# Patient Record
Sex: MALE | Race: WHITE | NOT HISPANIC OR LATINO | Employment: OTHER | URBAN - METROPOLITAN AREA
[De-identification: names, ages, dates, MRNs, and addresses within clinical notes are randomized per-mention and may not be internally consistent; named-entity substitution may affect disease eponyms.]

---

## 2017-01-02 ENCOUNTER — MYC MEDICAL ADVICE (OUTPATIENT)
Dept: FAMILY MEDICINE | Facility: CLINIC | Age: 47
End: 2017-01-02

## 2017-01-02 ENCOUNTER — MYC REFILL (OUTPATIENT)
Dept: ENDOCRINOLOGY | Facility: CLINIC | Age: 47
End: 2017-01-02

## 2017-01-02 ENCOUNTER — MYC MEDICAL ADVICE (OUTPATIENT)
Dept: PALLIATIVE MEDICINE | Facility: CLINIC | Age: 47
End: 2017-01-02

## 2017-01-02 ENCOUNTER — CARE COORDINATION (OUTPATIENT)
Dept: CARE COORDINATION | Facility: CLINIC | Age: 47
End: 2017-01-02

## 2017-01-02 ENCOUNTER — MYC REFILL (OUTPATIENT)
Dept: FAMILY MEDICINE | Facility: CLINIC | Age: 47
End: 2017-01-02

## 2017-01-02 ENCOUNTER — HOSPITAL ENCOUNTER (OUTPATIENT)
Dept: MRI IMAGING | Facility: CLINIC | Age: 47
Discharge: HOME | End: 2017-01-02
Attending: FAMILY MEDICINE | Admitting: FAMILY MEDICINE
Payer: COMMERCIAL

## 2017-01-02 DIAGNOSIS — Z79.891 ENCOUNTER FOR LONG-TERM OPIATE ANALGESIC USE: Primary | ICD-10-CM

## 2017-01-02 DIAGNOSIS — G89.18 PAIN FOLLOWING SURGERY OR PROCEDURE: ICD-10-CM

## 2017-01-02 DIAGNOSIS — Z98.84 BARIATRIC SURGERY STATUS: ICD-10-CM

## 2017-01-02 DIAGNOSIS — K29.70 GASTRITIS: ICD-10-CM

## 2017-01-02 DIAGNOSIS — M79.2 RADICULAR PAIN IN LEFT ARM: ICD-10-CM

## 2017-01-02 DIAGNOSIS — R10.9 CHRONIC ABDOMINAL PAIN: ICD-10-CM

## 2017-01-02 DIAGNOSIS — R11.0 NAUSEA: ICD-10-CM

## 2017-01-02 DIAGNOSIS — E55.9 VITAMIN D DEFICIENCY: ICD-10-CM

## 2017-01-02 DIAGNOSIS — F41.9 CHRONIC ANXIETY: ICD-10-CM

## 2017-01-02 DIAGNOSIS — G89.29 CHRONIC ABDOMINAL PAIN: ICD-10-CM

## 2017-01-02 DIAGNOSIS — M54.2 CERVICAL PAIN: ICD-10-CM

## 2017-01-02 DIAGNOSIS — G47.00 PERSISTENT INSOMNIA: ICD-10-CM

## 2017-01-02 PROCEDURE — 72141 MRI NECK SPINE W/O DYE: CPT

## 2017-01-02 NOTE — PROGRESS NOTES
Clinic Care Coordination Contact  [facility]/Voicemail    Referral Source: IP/TCU Report  Clinical Data: Care Coordinator Outreach  Patient presented to [facility] ED on 12/30 with non-functioning GT. Per chart review, GT was replaced and patient DC'd from ED with no new needs.   Outreach attempted x 1.  Left message on voicemail with call back information and requested return call.  Plan: Care Coordinator mailed out care coordination introduction letter on 10/3/16. Care Coordinator will try to reach patient again in 1-2 business days.  Kyle KHAN,RN- BC  Clinic Care Coordinator  [facility]  Phone: [phone]

## 2017-01-03 ENCOUNTER — CARE COORDINATION (OUTPATIENT)
Dept: CARE COORDINATION | Facility: CLINIC | Age: 47
End: 2017-01-03

## 2017-01-03 RX ORDER — ERGOCALCIFEROL 1.25 MG/1
50000 CAPSULE, LIQUID FILLED ORAL
Qty: 12 CAPSULE | Refills: 3 | Status: SHIPPED | OUTPATIENT
Start: 2017-01-03 | End: 2017-04-29

## 2017-01-03 RX ORDER — SUCRALFATE ORAL 1 G/10ML
1 SUSPENSION ORAL 4 TIMES DAILY
Qty: 1200 ML | Refills: 11 | Status: SHIPPED | OUTPATIENT
Start: 2017-01-03 | End: 2017-04-29

## 2017-01-03 RX ORDER — ONDANSETRON 8 MG/1
8 TABLET, ORALLY DISINTEGRATING ORAL EVERY 8 HOURS PRN
Qty: 90 TABLET | Refills: 3 | Status: SHIPPED | OUTPATIENT
Start: 2017-01-03 | End: 2017-04-29

## 2017-01-03 RX ORDER — LIDOCAINE/PRILOCAINE 2.5 %-2.5%
CREAM (GRAM) TOPICAL PRN
Qty: 30 G | Refills: 10 | Status: SHIPPED | OUTPATIENT
Start: 2017-01-03 | End: 2017-01-24

## 2017-01-03 RX ORDER — OXYCODONE HCL 5 MG/5 ML
10-15 SOLUTION, ORAL ORAL EVERY 4 HOURS PRN
Qty: 1350 ML | Refills: 0 | Status: SHIPPED | OUTPATIENT
Start: 2017-01-03 | End: 2017-02-02

## 2017-01-03 RX ORDER — FENTANYL 50 UG/1
1 PATCH TRANSDERMAL
Qty: 15 PATCH | Refills: 0 | Status: SHIPPED | OUTPATIENT
Start: 2017-01-03 | End: 2017-02-02

## 2017-01-03 RX ORDER — DIAZEPAM 5 MG
TABLET ORAL
Qty: 60 TABLET | Refills: 2 | Status: SHIPPED | OUTPATIENT
Start: 2017-01-03 | End: 2017-03-23

## 2017-01-03 RX ORDER — CYANOCOBALAMIN 1000 UG/ML
1 INJECTION, SOLUTION INTRAMUSCULAR; SUBCUTANEOUS
Qty: 1 ML | Refills: 11 | Status: SHIPPED | OUTPATIENT
Start: 2017-01-03 | End: 2017-04-11

## 2017-01-03 NOTE — PROGRESS NOTES
Clinic Care Coordination Contact  OUTREACH    Referral Information:  Referral Source: IP/TCU Report  Reason for Contact: Follow up        Universal Utilization:   ED Visits in last year: 12  Hospital visits in last year: 4     Missed Appointments: 1  Concerns: yes, high utilization  Multiple Providers or Specialists: yes    Clinical Concerns:  Current Medical Concerns: RN CC Called to f/u with pt/wife and to introduce new RN CC as previous RN CC is out on leave. Pt's wife was given RN CC contact information. Pt was in the ED on 12/30. G-tube was clogged and needed to be replaced. Pt had an MRI yesterday (due to neck, L arm pain) and will f/u with Neurology and PT this month.   Current Behavioral Concerns: NA    Education Provided to patient: NA   Clinical Pathway Name: None  Clinical Pathway: None    Medication Management:  No changes     Functional Status:  Mobility Status: Independent  Equipment Currently Used at Home: cane, straight, medication pump     Psychosocial:  Current living arrangement:: I live in a private home with spouse  Financial/Insurance: wife reports financial status is resolved. They are still looking for a different housing situation, but denies needing any assistance at this time.        Resources and Interventions:  Current Resources: Home Care, Home Infusion; Other (see comment) ([employer] and [employer] resolving rental)  Advanced Care Plans/Directives on file:: Yes       Plan:   1. No urgent concerns at this time. Apt scheduled as recommended.  2. RN CC will f/u with pt/wife again in 2-4 weeks. Wife encouraged to call sooner if needed.    Jyothi Byrd, RN-BSN  RN Clinic Care Coordinator  [facility]  [phone]

## 2017-01-03 NOTE — TELEPHONE ENCOUNTER
Vitamin D 65156 unit capsule - I could not locate this medication in patient's history      sucralfate (CARAFATE) 1 GM/10ML suspension      Last Written Prescription Date: 6/21/2016  Last Fill Quantity: 1200,  # refills: 11   Last Office Visit with [facility], [facility] or [facility] prescribing provider: 12/28/2016                                         Next 5 appointments (look out 90 days)     Feb 15, 2017  1:30 PM   Return Visit with Patti Milligan MD   [facility] ([facility])    [address] [address]   [phone]            Mar 02, 2017 11:00 AM   Office Visit with Esteban Daly MD   [facility] ([facility]    [address]  [place] [address]   [phone]                  lidocaine-prilocaine (EMLA) cream      Last Written Prescription Date:  10/18/2016  Last Fill Quantity: 30g,   # refills: 10  Last Office Visit with [facility], [facility] or [facility] prescribing provider: 12/28/2016  Future Office visit:    Next 5 appointments (look out 90 days)     Feb 15, 2017  1:30 PM   Return Visit with Patti Milligan MD   [facility] ([facility])    [address] [address]   [phone]            Mar 02, 2017 11:00 AM   Office Visit with Esteban Daly MD   [facility] ([facility])    [address]  [place] [address]   [phone]                   Routing refill request to provider for review/approval because:  Drug not on the [facility], [facility] or [facility] refill protocol or controlled substance    diazepam (VALIUM) 5 MG tablet      Last Written Prescription Date:  12/5/2016  Last Fill Quantity: 60,   # refills: 2  Last Office Visit with [facility], [facility] or [facility] prescribing provider: 12/28/2016  Future Office visit:    Next 5 appointments (look out 90 days)     Feb 15, 2017  1:30 PM   Return Visit with Patti  Bree Milligan MD   [facility] ([facility])    [address] [place] [address]   [phone]            Mar 02, 2017 11:00 AM   Office Visit with Esteban Daly MD   [facility]    [address]  [place] [address] [phone]                   Routing refill request to provider for review/approval because:  Drug not on the [facility], [facility] or [facility] refill protocol or controlled substance      cyanocobalamin (VITAMIN B12) 1000 MCG/ML injection      Last Written Prescription Date: 12/2/2016  Last Fill Quantity: 1mL,  # refills: 11  Last Office Visit with [facility], [facility] or [facility] prescribing provider: 12/28/2016                                         Next 5 appointments (look out 90 days)     Feb 15, 2017  1:30 PM   Return Visit with Patti Milligan MD   [facility] ([facility])    [address] [address]   [phone]            Mar 02, 2017 11:00 AM   Office Visit with Esteban Daly MD   [facility] ([facility]    [address]  [place] [address] [phone]                    ondansetron (ZOFRAN ODT) 8 MG disintegrating tablet (Discontinued)      Last Written Prescription Date:  2/16/2015  Last Fill Quantity: 90,   # refills: 3  Last Office Visit with [facility], [facility] or [facility] prescribing provider: 12/28/2016  Future Office visit:    Next 5 appointments (look out 90 days)     Feb 15, 2017  1:30 PM   Return Visit with Patti Milligan MD   [facility] ([facility])    [address] [address]   [phone]            Mar 02, 2017 11:00 AM   Office Visit with Esteban Daly MD   [facility] ([facility])    [address]  [place] [address] [phone]

## 2017-01-03 NOTE — TELEPHONE ENCOUNTER
Medication refill information reviewed.     Last due:      Fentanyl:  Fill on/after 12/23/16 to start on/after 12/26/16  Oxycodone:  Fill on/after 12/12/2016 not to start till 12/14/2016    Due date:      Fentanyl:  1/25/17    Oxycodone:  1/13/17    Weaning instructions:  N/A    Prescriptions prepped for review.     Demetrice Yeh RN-BSN  [facility]

## 2017-01-03 NOTE — TELEPHONE ENCOUNTER
Per patient Tomasa message:  Sent   1/2/2017 7:36 PM   I need to renew my oxycodone on the 13th of Jan 2017 and my fentynal patches are do on Jan. 26th 2017 so you can send them both over to [place] in [facility] or you can give me a call at [phone] if you have any questions Thanks again Rose [facility] Parker We hope you had a nice Bairon and a Happy New Year   ----  Routed to the nursing pool and to the MA pool to process refill(s).    Demetrice Yeh, RN-BSN  [facility]

## 2017-01-03 NOTE — TELEPHONE ENCOUNTER
"[facility]  Original authorizing provider: MD Parker Camara  would like a refill of the following medications:  ondansetron (ZOFRAN-ODT) 8 MG disintegrating tablet [Esteban Daly MD]  vitamin D (ERGOCALCIFEROL) 63678 UNIT capsule [Esteban Daly MD]  sucralfate (CARAFATE) 1 GM/10ML suspension [Esteban Daly MD]  lidocaine-prilocaine (EMLA) cream [Esteban Daly MD]  diazepam (VALIUM) 5 MG tablet [Esteban Daly MD]  cyanocobalamin (VITAMIN B12) 1000 MCG/ML injection [Esteban Daly MD]    Preferred pharmacy: [place], [place] - [address] [place] [address]    Comment:  and I will also be picking up the adderal all on Friday Jan 6th 2017 at 1pm Thanks again if you have any questions please let me know thanks    Medication renewals requested in this message routed to other providers:  Needle, Disp, (BD DISP NEEDLES) 27G X 1/2\" SERENITYC [Anita Méndez PA-C]  "

## 2017-01-03 NOTE — TELEPHONE ENCOUNTER
Patient requesting refill(s) of oxyCODONE (ROXICODONE) 5 MG/5ML solution    Last picked up from pharmacy on 12/14/16    fentaNYL (DURAGESIC) 50 mcg/hr 72 hr patch    Last picked up from pharmacy on 12/23/16    Pt last seen by prescribing provider on 11/14/16  Next appt scheduled for 2/15/17    Last urine drug screen date 6/9/16  Current opioid agreement on file (completed within the last year) Yes Date of opioid agreement: 11/25/16    Processing (pick one)    Mail to [facility] [place].    Will route to nursing pool for review and preparation of prescription(s).

## 2017-01-03 NOTE — TELEPHONE ENCOUNTER
Faxed PA   Awaiting decision    [employer] responded: coverage for patient has     Tried calling [employer] phone number listed on patient's insurance.  Unable to get through on this number.     Sent [employer] message to pt to clarify their current insurance

## 2017-01-03 NOTE — TELEPHONE ENCOUNTER
Spoke to patient's wife, she is needing us to do a PA for the brand name adderall.  Reviewed previous PA encounter dated 2/8/2016.  The PA is still effective for coverage through 2/8/2017.  Provider, do you recommend waiting until end of January to submit this PA or send it now? (I can print a copy of the last PA and fax it if you are comfortable with that?)    ID #[id number]  [facility] Ph: [phone]

## 2017-01-03 NOTE — TELEPHONE ENCOUNTER
"[facility]  Original authorizing provider: BRITTANY Guerrero  would like a refill of the following medications:  Needle, Disp, (BD DISP NEEDLES) 27G X 1/2\" MISC [Anita Méndez PA-C]    Preferred pharmacy: [facility] [place], [place] - [address] [place] [address]    Comment:  and I will also be picking up the adderal all on Friday Jan 6th 2017 at 1pm Thanks again if you have any questions please let me know thanks    Medication renewals requested in this message routed to other providers:  ondansetron (ZOFRAN-ODT) 8 MG disintegrating tablet [Esteban Daly MD]  vitamin D (ERGOCALCIFEROL) 46667 UNIT capsule [Esteban Daly MD]  sucralfate (CARAFATE) 1 GM/10ML suspension [Esteban Daly MD]  lidocaine-prilocaine (EMLA) cream [Esteban Daly MD]  diazepam (VALIUM) 5 MG tablet [Esteban Daly MD]  cyanocobalamin (VITAMIN B12) 1000 MCG/ML injection [Esteban Daly MD]  "

## 2017-01-03 NOTE — TELEPHONE ENCOUNTER
Provider, it appears that the adderall Rx was written for the next three months. Please review and advise.

## 2017-01-03 NOTE — TELEPHONE ENCOUNTER
Signed Prescriptions:                        Disp   Refills    oxyCODONE (ROXICODONE) 5 MG/5ML solution   1350 mL0        Sig: Take 10-15 mLs (10-15 mg) by mouth every 4 hours as           needed for moderate to severe pain Max of 45mg           per day. Fill on/after 1/11/17 not to start till           1/13/17.  Authorizing Provider: BRANDYN JENKINS    fentaNYL (DURAGESIC) 50 mcg/hr 72 hr patch 15 pat*0        Sig: Place 1 patch onto the skin every 48 hours MYLAN           BRAND ONLY. Fill on/after 1/23/17 to start           on/after 1/25/17  Authorizing Provider: BRANDYN JENKINS MD  [facility]

## 2017-01-03 NOTE — TELEPHONE ENCOUNTER
Should be fine to use the same information and reasons from last year to obtain approval for brand Adderall.    Esteban Daly MD  Please close encounter when call/work completed.

## 2017-01-04 ENCOUNTER — TELEPHONE (OUTPATIENT)
Dept: FAMILY MEDICINE | Facility: CLINIC | Age: 47
End: 2017-01-04

## 2017-01-04 ENCOUNTER — MYC MEDICAL ADVICE (OUTPATIENT)
Dept: PALLIATIVE MEDICINE | Facility: CLINIC | Age: 47
End: 2017-01-04

## 2017-01-04 ENCOUNTER — MYC MEDICAL ADVICE (OUTPATIENT)
Dept: FAMILY MEDICINE | Facility: CLINIC | Age: 47
End: 2017-01-04

## 2017-01-04 NOTE — TELEPHONE ENCOUNTER
Updated Insurance #: [phone]  ID # [id number]    Representative stated that a PA for adderall 20 mg is NOT needed.  This will ne covered by insurance (pending no changes) until 1/4/2018

## 2017-01-04 NOTE — TELEPHONE ENCOUNTER
Patient had a change in insurance.  Ondansetron 8mg ODT tabs are rejecting  -  Qty limit exceeded, max 18 tablets per 21 days.  Need to request PA for 3 tabs per day.    Patient takes 1 q8h    PA needed for:  Three times daily dosing of Ondansetron 8mg odt tabs  Insurance:  [facility]  Insur phone:  [phone]  Patient ID: [id number]    Thank you     -Fany John, Pharm.D., [facility], [phone]

## 2017-01-04 NOTE — TELEPHONE ENCOUNTER
Updated Insurance #: [phone]  ID # [id number]    PA form placed in Providers inbox to be completed  Then fax to [phone]

## 2017-01-04 NOTE — TELEPHONE ENCOUNTER
"Called the patient's pharmacy to get insurance contact information.  She stated that the patient's insurance \"is not working.\"  They have multiple medications at the pharmcy that have not been picked up, due to this.  Likely, that we are unable to process a PA at this time either.    Will await insurance/phar,acy to direct the need for a PA.  Will not pursue this PA directed by the patient at this time.  "

## 2017-01-04 NOTE — TELEPHONE ENCOUNTER
Reason for PA:    Has had a total gastrectomy and is unable to absorb oral tablets. Needs oral dissolving tablets. Has chronic nausea and abdominal pain and requires 3 times daily administration.    Esteban Daly MD

## 2017-01-05 ENCOUNTER — MYC MEDICAL ADVICE (OUTPATIENT)
Dept: PALLIATIVE MEDICINE | Facility: CLINIC | Age: 47
End: 2017-01-05

## 2017-01-05 NOTE — TELEPHONE ENCOUNTER
Try appealing the decision--    He has needed Zofran daily using multiple doses due to chronic nausea secondary to post operative complication and multiple abdominal surgeries and is need of oral dissolving tablets. Is post total gastrectomy and not able to have oral; tablets be effective. Is also on TPN with no oral intake.    Esteban Daly MD

## 2017-01-05 NOTE — TELEPHONE ENCOUNTER
Called insurance to initiate quantity limit PA for the Fentanyl Patch 50 mcg/hr initiated the PA over the phone and got approval for the medication.   Pharmacy and pt are informed  Approval # [id number]  1/5/17 to 1/5/18    Corky Chatman MA  [facility]

## 2017-01-05 NOTE — TELEPHONE ENCOUNTER
Received reponse from [employer]. PA denied.    Your plan covers this drug when you meet one of these conditions:  -you have hyperemesis gravidarum  -you are undergoing radiation therapy  -you are undergoing moderate to highly emetogenic chemotherapy    Provider, would you like to change the medication or appeal the decision?

## 2017-01-05 NOTE — TELEPHONE ENCOUNTER
Faxed appeal to [phone]    To Appeals Department:  In response to the denial of the medication ondansetron oral dissolving tablets, we must appeal your denial decision.  The patient has needed Zofran daily, using multiple doses, due to chronic nausea, secondary to post-operative complications and multiple abdominal surgeries.  He is in need of oral dissolving tablets, as a result of a total gastrectomy.  Parker is not able to have oral tablets, they would be ineffective.   The patient is also on TPN with no oral intake.  We ask that you reevaluate this authorization and approve your coverage of ondansetron (ZOFRAN-ODT) 8 MG oral dissolving tablets.  Awaiting decision/response

## 2017-01-05 NOTE — TELEPHONE ENCOUNTER
Per patient Tomasa message:  Sent   1/4/2017  3:02 PM           you will need to call this number so steven can get the myland brand and for the quanty of patches he needs he has a new pharmacy card and the phone # is [phone] and what they all need is a letter stating why he needs and medical records to back it up his date of birth which is 1970 and his ID # [id number] and just incase they need the other numbers RXBIN # [id number] [id number] # [id number] # [id number] if you have any questions feel free to call us at [phone] thanks again Alina

## 2017-01-05 NOTE — TELEPHONE ENCOUNTER
Routed to the [facility] to call pharmacy to find out more information about prior authorization.    Current sig for fentanyl is:  fentaNYL (DURAGESIC) 50 mcg/hr 72 hr patch 15 patch 0 1/3/2017  No      Sig: Place 1 patch onto the skin every 48 hours MYLAN BRAND ONLY. Fill on/after 1/23/17 to start on/after 1/25/17   Mylan brand is used b/c they adhere to skin better than other brands and pt has had issues w/ other patch brands sticking.    Demetrice Yeh, RN-BSN  [facility]

## 2017-01-06 ENCOUNTER — MYC MEDICAL ADVICE (OUTPATIENT)
Dept: PALLIATIVE MEDICINE | Facility: CLINIC | Age: 47
End: 2017-01-06

## 2017-01-06 ENCOUNTER — TELEPHONE (OUTPATIENT)
Dept: FAMILY MEDICINE | Facility: CLINIC | Age: 47
End: 2017-01-06

## 2017-01-06 NOTE — TELEPHONE ENCOUNTER
Rose called back and stated they have not yet picked up the medication. It costs $365 for brand adderall.    Called insurance again.  No PA is needed. Brand adderall is $365. Generic is $10.  Patient will need to pay out of pocket until they meet their deductible.    Rose informed of this. Nothing more we can do at this time.

## 2017-01-06 NOTE — TELEPHONE ENCOUNTER
My chart message from pt:    Do we need to come to the office to  his percription slips or are you going to mail them over please let us know thanks Rose Rose    They were mailed out on 1/3/2017 to [facility] pharmacy.     Karo Nuñez RN, [facility]  Pain Clinic Care Coordinator

## 2017-01-06 NOTE — TELEPHONE ENCOUNTER
Cammy wife said that her insurance coming would not let Parker have name brand klarissa.  Please call her.

## 2017-01-06 NOTE — TELEPHONE ENCOUNTER
Called insurance to verify that the adderall is indeed covered - brand name adderall. The rep stated yes.  She stated that they see there is a paid claim for the medication today.    Left detailed message informing patient.   Asked them to call back with any other concerns.

## 2017-01-09 ENCOUNTER — MYC MEDICAL ADVICE (OUTPATIENT)
Dept: FAMILY MEDICINE | Facility: CLINIC | Age: 47
End: 2017-01-09

## 2017-01-09 NOTE — TELEPHONE ENCOUNTER
Parker Acevedo Sr is a 46 year old male who calls with rectal bleeding.    NURSING ASSESSMENT:  Description:  Toilet paper had a blood clot on it, covered about 3 sheets of toilet paper and had a bloody nose at the same time. Bleeding occurred for under a minute and resolved on its own. Neck pain is ongoing. Denies chest pain, shortness of breath, lightheadedness, dizziness, fevers, rash, severe bleeding or pain.   Onset/duration:  01/09/2017  Pain scale (0-10)   5/10 in the neck and abdomen   Last exam/Treatment:  12/28/2016  Allergies:   Allergies   Allergen Reactions     Penicillins Anaphylaxis     Doxycycline Rash     Vancomycin Rash     Rash after receiving vancomycin 3/28/16 (red man's?). Tolerated with slower infusion and diphenhydramine premed.     NURSING PLAN: Nursing advice to patient to monitor    RECOMMENDED DISPOSITION:  Home care advice - per Rectal Problems protocol   Will comply with recommendation: Yes  If further questions/concerns or if symptoms do not improve, worsen or new symptoms develop, call your PCP or [facility] as soon as possible.    NOTES:  Disposition was determined by the first positive assessment question, therefore all previous assessment questions were negative    Guideline used:  Telephone Triage Protocols for Nurses, Fourth Edition, Rand Joseph  Rectal Problems  Nursing Judgment    Melva Alfredo RN

## 2017-01-09 NOTE — TELEPHONE ENCOUNTER
Per patient [employer] message:    Sent   1/6/2017  6:17 PM           Since we will be in [place] for an appointment with Dr Campos will it be ok to  his medicine on that Thursday Jan. 12th instead of driving back out there on Friday the 13th            Routed to the nursing pool to process.    Demetrice Yeh, RN-BSN  [facility]

## 2017-01-12 ENCOUNTER — OFFICE VISIT (OUTPATIENT)
Dept: NEUROSURGERY | Facility: OTHER | Age: 47
End: 2017-01-12
Payer: COMMERCIAL

## 2017-01-12 VITALS — HEIGHT: 71 IN | WEIGHT: 173.2 LBS | BODY MASS INDEX: 24.25 KG/M2 | TEMPERATURE: 97.9 F

## 2017-01-12 DIAGNOSIS — M54.2 CERVICALGIA: Primary | ICD-10-CM

## 2017-01-12 PROCEDURE — 99204 OFFICE O/P NEW MOD 45 MIN: CPT | Performed by: NEUROLOGICAL SURGERY

## 2017-01-12 NOTE — PROGRESS NOTES
"Parker Acevedo Sr is a 46 year old male who presents for:  Chief Complaint   Patient presents with     Neurologic Problem     cervical pain can go into arms        Initial Vitals:  Temp(Src) 97.9  F (36.6  C) (Skin)  Ht 5' 11\" (1.803 m)  Wt 173 lb 3.2 oz (78.563 kg)  BMI 24.17 kg/m2 Estimated body mass index is 24.17 kg/(m^2) as calculated from the following:    Height as of this encounter: 5' 11\" (1.803 m).    Weight as of this encounter: 173 lb 3.2 oz (78.563 kg).. Body surface area is 1.98 meters squared. BP completed using cuff size: NA (Not Taken)  Data Unavailable    Do you feel safe in your environment?  Yes  Do you need any refills today? No    Nursing Comments:       5 min nursing intake time  Rene Daniel    Discharge plan:    nursing discharge time   signature    "

## 2017-01-12 NOTE — MR AVS SNAPSHOT
After Visit Summary   1/12/2017    Parker Acevedo Sr    MRN: [id number]           Patient Information     Date Of Birth          1970        Visit Information        Provider Department      1/12/2017 11:20 AM Darren Campos MD [facility] [place]        Today's Diagnoses     Cervicalgia    -  1       Care Instructions    Physical therapy for neck  Injection at [facility]        Follow-ups after your visit        Additional Services     PAIN MANAGEMENT CENTER ([place]) REFERRAL       Your provider has referred you to the [facility].    Reason for Referral: Procedure or injection only - patient will be contacted within 24 hrs to schedule: C5-6 epidural steroid injection     Please complete the following questions:    What is your diagnosis for the patient's pain? Cervical pain    Do you have any specific questions for the pain specialist? No    Are there any red flags that may impact the assessment or management of the patient? None    **ANY DIAGNOSTIC TESTS THAT ARE NOT IN EPIC SHOULD BE SENT TO THE PAIN CENTER**    Please note the Pre-Op Pain Consult must be scheduled 2-3 weeks prior to the patient's surgery.  Patient's trying to schedule within 2 weeks of surgery may not be accommodated.     Pre-Op Pain Consults are only good for 30 days.    REGARDING OPIOID MEDICATIONS:  We will always address appropriateness of opioid pain medications, but we generally will not automatically take on a prescribing role. When we do take on prescribing of opioids for chronic pain, it is in collaboration with the referring physician for an intermediate period of time (months), with an expectation that the primary physician or provider will assume the prescribing role if medications are effective at stable doses with demonstrated compliance.  Therefore, please do not assume that your prescribing responsibilities end on the day of pain clinic  consultation.  Is this agreeable to you? YES    For any questions, contact the [facility] at [phone].    Please be aware that coverage of these services is subject to the terms and limitations of your health insurance plan.  Call member services at your health plan with any benefit or coverage questions.      Please bring the following with you to your appointment:    (1) Any X-Rays, CTs or MRIs which have been performed.  Contact the facility where they were done to arrange for  prior to your scheduled appointment.    (2) List of current medications   (3) This referral request   (4) Any documents/labs given to you for this referral            PHYSICAL THERAPY REFERRAL       *This therapy referral will be filtered to a centralized scheduling office at [facility] and the patient will receive a call to schedule an appointment at a [place] location most convenient for them. *     [facility] provides Physical Therapy evaluation and treatment and many specialty services across the [place] system.  If requesting a specialty program, please choose from the list below.    If you have not heard from the scheduling office within 2 business days, please call [phone] for all locations, with the exception of [place], please call [phone].  Treatment: Evaluation & Treatment  Special Instructions/Modalities:   Special Programs: None    Please be aware that coverage of these services is subject to the terms and limitations of your health insurance plan.  Call member services at your health plan with any benefit or coverage questions.      **Note to Provider:  If you are referring outside of [place] for the therapy appointment, please list the name of the location in the  special instructions  above, print the referral and give to the patient to schedule the appointment.                  Your next 10 appointments already scheduled     Jan 17,  2017 11:00 AM   AMA[facility] Spine with Antonio Tinoco PT   [facility] [facility] ([facility])    [address]  [address]  [place] [address]   [phone]            Jan 19, 2017 11:20 AM   (Arrive by 11:05 AM)   RETURN BARIATRIC SURGERY with Francis Vyas MD   [facility] ([facility])    [address] [place] [address]  4th Floor  [facility] [address] [phone]            Feb 15, 2017  1:30 PM   Return Visit with Patti Milligan MD   [facility] [place] ([facility] [place])    [address]  [place] [address]   [phone]            Mar 02, 2017 11:00 AM   Office Visit with Esteban Daly MD   [facility]    [address]  [facility] [id number] [phone]           Bring a current list of meds and any records pertaining to this visit.  For Physicals, please bring immunization records and any forms needing to be filled out.  Please arrive 10 minutes early to complete paperwork.              Who to contact     If you have questions or need follow up information about today's clinic visit or your schedule please contact [facility] directly at [phone].  Normal or non-critical lab and imaging results will be communicated to you by MyChart, letter or phone within 4 business days after the clinic has received the results. If you do not hear from us within 7 days, please contact the clinic through [employer] or phone. If you have a critical or abnormal lab result, we will notify you by phone as soon as possible.  Submit refill requests through [employer] or call your pharmacy and they will forward the refill request to us. Please allow 3 business days for your refill to be completed.          Additional Information About Your Visit        [employer] Information     [employer] gives you secure access to your electronic  "health record. If you see a primary care provider, you can also send messages to your care team and make appointments. If you have questions, please call your primary care clinic.  If you do not have a primary care provider, please call [phone] and they will assist you.        Care EveryWhere ID     This is your Care EveryWhere ID. This could be used by other organizations to access your [facility] medical records  [id number]        Your Vitals Were     Temperature Height BMI (Body Mass Index)             97.9  F (36.6  C) (Skin) 5' 11\" (1.803 m) 24.17 kg/m2          Blood Pressure from Last 3 Encounters:   12/31/16 124/81   12/28/16 120/62   12/02/16 122/66    Weight from Last 3 Encounters:   01/12/17 173 lb 3.2 oz (78.563 kg)   12/28/16 173 lb 8 oz (78.699 kg)   12/02/16 196 lb 3.2 oz (88.996 kg)              We Performed the Following     PAIN MANAGEMENT CENTER ([place]) REFERRAL     PHYSICAL THERAPY REFERRAL          Today's Medication Changes          These changes are accurate as of: 1/12/17 11:54 AM.  If you have any questions, ask your nurse or doctor.               These medicines have changed or have updated prescriptions.        Dose/Directions    mupirocin 2 % ointment   Commonly known as:  BACTROBAN   This may have changed:    - when to take this  - reasons to take this   Used for:  Skin infection        Apply topically 3 times daily   Quantity:  30 g   Refills:  6                Primary Care Provider Office Phone # Fax #    Esteban Daly [phone]       [facility] [address] [address] [facility] [address]        Thank you!     Thank you for choosing [facility]  for your care. Our goal is always to provide you with excellent care. Hearing back from our patients is one way we can continue to improve our services. Please take a few minutes to complete the written survey that you may receive in the mail after your visit with us. Thank you!           " "  Your Updated Medication List - Protect others around you: Learn how to safely use, store and throw away your medicines at www.disposemymeds.org.          This list is accurate as of: 1/12/17 11:54 AM.  Always use your most recent med list.                   Brand Name Dispense Instructions for use    * amphetamine-dextroamphetamine 20 MG per tablet    ADDERALL    60 tablet    Take 1 tablet (20 mg) by mouth 2 times daily       * amphetamine-dextroamphetamine 20 MG per tablet   Start taking on:  2/3/2017    ADDERALL    60 tablet    Take 1 tablet (20 mg) by mouth 2 times daily       * amphetamine-dextroamphetamine 20 MG per tablet   Start taking on:  3/6/2017    ADDERALL    60 tablet    Take 1 tablet (20 mg) by mouth 2 times daily       cyanocobalamin 1000 MCG/ML injection    VITAMIN B12    1 mL    Inject 1 mL (1,000 mcg) into the muscle every 30 days       dextrose 1,000 mL with sodium chloride 76.92 mEq solution     2640 mL    Inject 110 mL/hr into the vein continuous       diazepam 5 MG tablet    VALIUM    60 tablet    5 mg each morning and 5 mg at bedtime       [facility] INFUSION MANAGED PATIENT      Contact [facility] for patient specific medication information at [phone] on admission and discharge from the hospital.  Phones are answered 24 hours a day 7 days a week 365 days a year.  Providers - Choose \"CONTINUE HOME MED (no script)\" at discharge if patient treatment with home infusion will continue.       fentaNYL 50 mcg/hr 72 hr patch    DURAGESIC    15 patch    Place 1 patch onto the skin every 48 hours MYLAN BRAND ONLY. Fill on/after 1/23/17 to start on/after 1/25/17       lidocaine-prilocaine cream    EMLA    30 g    Apply topically as needed for moderate pain       morphine 0.1% in intrasite topical gel     100 g    Apply as needed prior to accessing the port site.       mupirocin 2 % ointment    BACTROBAN    30 g    Apply topically 3 times daily       Needle (Disp) 27G X 1/2\" Misc "    BD DISP NEEDLES    3 each    1 Device every 30 days Use for cyanocobalamin injection once q 30 days.       nystatin-triamcinolone cream    MYCOLOG II    60 g    Apply topically 2 times daily as needed       ondansetron 8 MG ODT tab    ZOFRAN-ODT    90 tablet    Take 1 tablet (8 mg) by mouth every 8 hours as needed for nausea       * order for DME     12 each    Injection Supplies for Vitamin B12: 3cc syringes w/ 27 gauge needles, 1/2 inch length       * order for DME     4 each    Equipment being ordered: Bilateral knee high chronic venous insufficiency stockings--  mild-moderate pressures.       oxyCODONE 5 MG/5ML solution    ROXICODONE    1350 mL    Take 10-15 mLs (10-15 mg) by mouth every 4 hours as needed for moderate to severe pain Max of 45mg per day. Fill on/after 1/11/17 not to start till 1/13/17.       polyethylene glycol powder    MIRALAX    510 g    Take 17 g (1 capful) by mouth daily as needed       senna-docusate 8.6-50 MG per tablet    SENOKOT-S;PERICOLACE    120 tablet    Take 1-2 tablets by mouth 2 times daily as needed for constipation       sucralfate 1 GM/10ML suspension    CARAFATE    1200 mL    Take 10 mLs (1 g) by mouth 4 times daily       vitamin D 10791 UNIT capsule    ERGOCALCIFEROL    12 capsule    Take 1 capsule (50,000 Units) by mouth every 7 days       * Notice:  This list has 5 medication(s) that are the same as other medications prescribed for you. Read the directions carefully, and ask your doctor or other care provider to review them with you.

## 2017-01-12 NOTE — PROGRESS NOTES
46-year-old male presents with left arm pain, neck pain, and C5 6 disc herniation.  He had a fall over a couch in December.  Since that fall, he has had eight out of 10 neck pain, with sharp pain at the base of the occiput, radiating into the left arm and hand, and left hand weakness.  The arm pain and weakness of slightly improved since the fall.  The pain is made worse by standing or neck range of motion.         Past Medical History   Diagnosis Date     Tobacco abuse      ADHD (attention deficit hyperactivity disorder)      Gastro-oesophageal reflux disease      Hiatal hernia      Gastric ulcer, unspecified as acute or chronic, without mention of hemorrhage, perforation, or obstruction      Difficulty swallowing      Chronic abdominal pain      Short gut syndrome      Severe malnutrition (H)      TPN     Other bladder disorder      Head injury      Anxiety      Other chronic pain      Past Surgical History   Procedure Laterality Date     Hernia repair  2006     Umbilical hernia     Endoscopy  03/25/11     [facility]     Endoscopy  08/04/09     Upper Endoscopy, [facility]     Endoscopy  01/05/09     Upper Endoscopy[facility]     Herniorrhaphy hiatal  6/22/2012     Procedure: HERNIORRHAPHY HIATAL;;  Surgeon: Francis Vyas MD;  Location: [facility] OR     Celestino en y bowel  2003     Cholecystectomy       Gastrojejunostomy  08/26/09     Extensice enterolysis, partial resect. jejunum, part. resect gastric pouch, gastrojejunostomy anastomosis     Gastrectomy  6/22/2012     Procedure: GASTRECTOMY;  Open Approach, Excise Ulcers,Partial Gastrectomy, Esophagojejunostomy, Hiatal Hernia Repair, Extensive Lysis of Adhesions and Esaphagogastrodudenoscopy.;  Surgeon: Francis Vyas MD;  Location: [facility] OR     Tonsillectomy       C gastric bypass,obese<100cm celestino-en-y  2002     lost 300 pounds     Esophagoscopy, gastroscopy, duodenoscopy (egd), combined  4/20/2011     Procedure:COMBINED  ESOPHAGOSCOPY, GASTROSCOPY, DUODENOSCOPY (EGD); Surgeon:FRANCIS VYAS; Location:[facility]     Endoscopic ultrasound upper gastrointestinal tract (gi)  4/29/2011     Procedure:ENDOSCOPIC ULTRASOUND UPPER GASTROINTESTINAL TRACT (GI); Both Procedures done Conjointly; Surgeon:NEREIDA HOUSER; [facility]     Esophagoscopy, gastroscopy, duodenoscopy (egd), combined  6/15/2011     Procedure:COMBINED ESOPHAGOSCOPY, GASTROSCOPY, DUODENOSCOPY (EGD); Surgeon:FRANCIS VYAS; [facility]     Esophagoscopy, gastroscopy, duodenoscopy (egd), combined  6/12/2013     Procedure: COMBINED ESOPHAGOSCOPY, GASTROSCOPY, DUODENOSCOPY (EGD);;  Surgeon: Francis Vyas MD;  Location: [facility]     Hc esoph/gas reflux test w nasal imped electrode  8/5/2013     Procedure: ESOPHAGEAL IMPEDENCE FUNCTION TEST 1 HOUR OR LESS;  Surgeon: Halie Lang MD;  Location: [facility]     Endoscopic ultrasound upper gastrointestinal tract (gi)  9/9/2013     Procedure: ENDOSCOPIC ULTRASOUND UPPER GASTROINTESTINAL TRACT (GI);  Endoscopic Ultrasound Guide Gastrostomy Tube Placement  C-arm;  Surgeon: Noe Lizarraga MD;  Location: [facility]     Endoscopic insertion tube gastrostomy  9/9/2013     Procedure: ENDOSCOPIC INSERTION TUBE GASTROSTOMY;;  Surgeon: Francis Vyas MD;  [facility]     Laparotomy exploratory  11/22/2013     Procedure: LAPAROTOMY EXPLORATORY;  Exploratory Laparotomy, Upper Endoscopy, Left Inguinal Hernia Repair;  Surgeon: Francis Vyas MD;  [facility]: [facility]     Herniorrhaphy inguinal  11/22/2013     Procedure: HERNIORRHAPHY INGUINAL;;  Surgeon: Francis Vyas MD;  [facility]     Esophagoscopy, gastroscopy, duodenoscopy (egd), combined  11/22/2013     Procedure: COMBINED ESOPHAGOSCOPY, GASTROSCOPY, DUODENOSCOPY (EGD);;  Surgeon: Francis Vyas MD;  [facility]     Esophagoscopy, gastroscopy, duodenoscopy (egd), combined  4/30/2014     Procedure: COMBINED ESOPHAGOSCOPY, GASTROSCOPY,  DUODENOSCOPY (EGD);  Surgeon: Francis Vyas MD;  Location:  GI     Appendectomy       Soft tissue surgery       Back surgery  11/3/2014     curve in the spine     Biopsy lymph node cervical N/A 2/20/2015     Procedure: BIOPSY LYMPH NODE CERVICAL;  Surgeon: Baron Scanlon MD;  Location: [facility]     Esophagoscopy, gastroscopy, duodenoscopy (egd), combined N/A 2/20/2015     Procedure: COMBINED ESOPHAGOSCOPY, GASTROSCOPY, DUODENOSCOPY (EGD), BIOPSY SINGLE OR MULTIPLE;  Surgeon: Baron Scanlon MD;  Location: [facility] OR     Soft tissue surgery       Esophagoscopy, gastroscopy, duodenoscopy (egd), combined N/A 9/30/2015     Procedure: COMBINED ESOPHAGOSCOPY, GASTROSCOPY, DUODENOSCOPY (EGD);  Surgeon: Francis Vyas MD;  Location:  GI     Social History     Social History     Marital Status:      Spouse Name: Rose     Number of Children: 1     Years of Education: N/A     Occupational History     Not on file.     Social History Main Topics     Smoking status: Light Tobacco Smoker -- 0.10 packs/day for 3 years     Types: Cigarettes     Smokeless tobacco: Current User      Comment: I use an e cig every now and than     Alcohol Use: No      Comment: quit      Drug Use: No     Sexual Activity:     Partners: Female     Birth Control/ Protection: None      Comment: no protection     Other Topics Concern     Parent/Sibling W/ Cabg, Mi Or Angioplasty Before 65f 55m? No     Social History Narrative     Family History   Problem Relation Age of Onset     DIABETES Maternal Uncle      GASTROINTESTINAL DISEASE Mother      Crohns disease     Anxiety Disorder Mother      Thyroid Disease Mother      Grave's disease     CANCER Father      ear cancer-skin cancer/melanoma     Breast Cancer Maternal Grandmother      Breast Cancer Other      Hypertension No family hx of      Hyperlipidemia No family hx of      CEREBROVASCULAR DISEASE No family hx of      Breast Cancer No family hx of      Prostate Cancer No family  "hx of      Depression No family hx of      Anesthesia Reaction No family hx of      Asthma No family hx of      OSTEOPOROSIS No family hx of      Genetic Disorder No family hx of      Obesity No family hx of      MENTAL ILLNESS No family hx of      Substance Abuse No family hx of         ROS: 10 point ROS neg other than the symptoms noted above in the HPI.    Physical Exam  Temp(Src) 97.9  F (36.6  C) (Skin)  Ht 1.803 m (5' 11\")  Wt 78.563 kg (173 lb 3.2 oz)  BMI 24.17 kg/m2  HEENT:  Normocephalic, atraumatic.  PERRLA.  EOM s intact.  Visual fields full to gross exam  Neck:  Supple, non-tender, without lymphadenopathy.  Heart:  No peripheral edema  Lungs:  No SOB  Abdomen:  Non-distended.   Skin:  Warm and dry.  Extremities:  No edema, cyanosis or clubbing.    NEUROLOGICAL EXAMINATION:     Mental status:  Alert and Oriented x 3, speech is fluent.  Cranial nerves:  II-XII intact.   Motor:  Strength is 5/5 throughout the upper and lower extremities  Shoulder Abduction:  Right:  5/5   Left:  5/5  Biceps:                      Right:  4+/5   Left:  5/5  Triceps:                     Right:  5/5   Left:  5/5  Wrist Extensors:       Right:  5/5   Left:  5/5  Wrist Flexors:           Right:  5/5   Left:  5/5  interosseus :            Right:  4/5   Left:  5/5   Hip Flexor:                Right: 5/5  Left:  5/5  Hip Adductor:             Right:  5/5  Left:  5/5  Hip Abductor:             Right:  5/5  Left:  5/5  Gastroc Soleus:        Right:  5/5  Left:  5/5  Tib/Ant:                      Right:  5/5  Left:  5/5  EHL:                     Right:  5/5  Left:  5/5  Sensation:  Intact  Reflexes:  Negative Babinski.  Negative Clonus.  Negative Johnson's.  Coordination:  Smooth finger to nose testing.   Negative pronator drift.  Smooth tandem walking.    A/P:  46-year-old male presents with left arm pain, neck pain, and C5 6 disc herniation    I had a lengthy discussion with the patient, reviewing the history, symptoms and " imaging  We referred him for physical therapy and epidural steroid injection  We discussed with him that if he doesn't improve, we may consider surgical intervention going forward  We discussed that this would likely consist of a C5 6 ACDF

## 2017-01-13 ENCOUNTER — HOSPITAL ENCOUNTER (OUTPATIENT)
Facility: CLINIC | Age: 47
Setting detail: SPECIMEN
Discharge: HOME | End: 2017-01-13
Admitting: SURGERY
Payer: COMMERCIAL

## 2017-01-13 ENCOUNTER — MYC MEDICAL ADVICE (OUTPATIENT)
Dept: PALLIATIVE MEDICINE | Facility: CLINIC | Age: 47
End: 2017-01-13

## 2017-01-13 LAB
ALBUMIN SERPL-MCNC: 3.5 G/DL (ref 3.4–5)
ALP SERPL-CCNC: 78 U/L (ref 40–150)
ALT SERPL W P-5'-P-CCNC: 21 U/L (ref 0–70)
ANION GAP SERPL CALCULATED.3IONS-SCNC: 7 MMOL/L (ref 3–14)
AST SERPL W P-5'-P-CCNC: 18 U/L (ref 0–45)
BASOPHILS # BLD AUTO: 0 10E9/L (ref 0–0.2)
BASOPHILS NFR BLD AUTO: 0.5 %
BILIRUB DIRECT SERPL-MCNC: 0.1 MG/DL (ref 0–0.2)
BILIRUB SERPL-MCNC: 0.4 MG/DL (ref 0.2–1.3)
BUN SERPL-MCNC: 14 MG/DL (ref 7–30)
CALCIUM SERPL-MCNC: 8.5 MG/DL (ref 8.5–10.1)
CHLORIDE SERPL-SCNC: 106 MMOL/L (ref 94–109)
CO2 SERPL-SCNC: 30 MMOL/L (ref 20–32)
CREAT SERPL-MCNC: 0.76 MG/DL (ref 0.66–1.25)
DIFFERENTIAL METHOD BLD: ABNORMAL
EOSINOPHIL # BLD AUTO: 0.4 10E9/L (ref 0–0.7)
EOSINOPHIL NFR BLD AUTO: 6.5 %
ERYTHROCYTE [DISTWIDTH] IN BLOOD BY AUTOMATED COUNT: 14.4 % (ref 10–15)
GFR SERPL CREATININE-BSD FRML MDRD: NORMAL ML/MIN/1.7M2
GLUCOSE SERPL-MCNC: 87 MG/DL (ref 70–99)
HCT VFR BLD AUTO: 37.9 % (ref 40–53)
HGB BLD-MCNC: 12.3 G/DL (ref 13.3–17.7)
IMM GRANULOCYTES # BLD: 0 10E9/L (ref 0–0.4)
IMM GRANULOCYTES NFR BLD: 0.2 %
LYMPHOCYTES # BLD AUTO: 1.6 10E9/L (ref 0.8–5.3)
LYMPHOCYTES NFR BLD AUTO: 28 %
MAGNESIUM SERPL-MCNC: 2.3 MG/DL (ref 1.6–2.3)
MCH RBC QN AUTO: 31 PG (ref 26.5–33)
MCHC RBC AUTO-ENTMCNC: 32.5 G/DL (ref 31.5–36.5)
MCV RBC AUTO: 96 FL (ref 78–100)
MONOCYTES # BLD AUTO: 0.8 10E9/L (ref 0–1.3)
MONOCYTES NFR BLD AUTO: 14.3 %
NEUTROPHILS # BLD AUTO: 3 10E9/L (ref 1.6–8.3)
NEUTROPHILS NFR BLD AUTO: 50.5 %
NRBC # BLD AUTO: 0 10*3/UL
NRBC BLD AUTO-RTO: 0 /100
PHOSPHATE SERPL-MCNC: 4 MG/DL (ref 2.5–4.5)
PLATELET # BLD AUTO: 146 10E9/L (ref 150–450)
POTASSIUM SERPL-SCNC: 4 MMOL/L (ref 3.4–5.3)
PREALB SERPL IA-MCNC: 21 MG/DL (ref 15–45)
PROT SERPL-MCNC: 6.9 G/DL (ref 6.8–8.8)
RBC # BLD AUTO: 3.97 10E12/L (ref 4.4–5.9)
SODIUM SERPL-SCNC: 143 MMOL/L (ref 133–144)
TRIGL SERPL-MCNC: 44 MG/DL
WBC # BLD AUTO: 5.9 10E9/L (ref 4–11)

## 2017-01-13 PROCEDURE — 84478 ASSAY OF TRIGLYCERIDES: CPT | Performed by: SURGERY

## 2017-01-13 PROCEDURE — 85025 COMPLETE CBC W/AUTO DIFF WBC: CPT | Performed by: SURGERY

## 2017-01-13 PROCEDURE — 82248 BILIRUBIN DIRECT: CPT | Performed by: SURGERY

## 2017-01-13 PROCEDURE — 84100 ASSAY OF PHOSPHORUS: CPT | Performed by: SURGERY

## 2017-01-13 PROCEDURE — 83735 ASSAY OF MAGNESIUM: CPT | Performed by: SURGERY

## 2017-01-13 PROCEDURE — 80053 COMPREHEN METABOLIC PANEL: CPT | Performed by: SURGERY

## 2017-01-13 PROCEDURE — 84134 ASSAY OF PREALBUMIN: CPT | Performed by: SURGERY

## 2017-01-16 NOTE — TELEPHONE ENCOUNTER
Pre-screening questions for Radiology Injections:    Injection to be done at which interventional clinic site? [facility] [place]    Procedure ordered by Dr. JENKINS    Procedure ordered? Cervical Epidural Steroid Injection    What insurance would patient like us to bill for this procedure? BCBS      Worker's comp-Any injection DO NOT SCHEDULE and route to Carly Fuentes.      [employer] insurance - If scheduling an SI joint injection DO NOT SCHEDULE and route Carly Fuentes.    [employer]- MBB's must be scheduled at LEAST two weeks apart      [employer] - Any injection besides hip/shoulder/knee joint DO NOT SCHEDULE and route to Carly Fuentes. She will obtain PA and call pt back to schedule procedure or notify pt of denial.     Is an  needed? No     Patient has a drive home? (mandatory) YES:      Is patient taking any blood thinners (plavix, coumadin, jantoven, warfarin, heparin, pradaxa or dabigatran )? No   (If so, do not schedule, contact RN and/or MD)     Is patient taking any aspirin products? No   (If more than 325mg/day do not schedule; Contact RN/MD. For all non-cervical interventional procedures if patient is taking MORE than 325mg/day, limit aspirin to 81-325mg/day x 1 week. No hold required day of procedure.  For CERVICAL procedures, hold all aspirin products for 6 days.)      Does the patient have a bleeding or clotting disorder? No   (If yes, okay to schedule, but contact RN/MD).  **For any patients with platelet count <100, must be forwarded to provider**    Is patient diabetic?  No  If YES, have them bring their glucometer.    Does patient have an active infection or treated for one within the past week? No     Is patient currently taking any antibiotics?  No   For patients on chronic, preventative, or prophylactic antibiotics, procedures can be scheduled.   For patients on antibiotics for active or recent infection:  Aaron Mena, [place]-antibiotic course  must have been completed for 4 days  Poly De Paz-antibiotic course must have been completed for 7 days    Is patient currently taking any steroid medications? (i.e. Prednisone, Medrol)  No   For patients on steroid medications:  Aaron Mena [place]-steroid course must have been completed for 4 days  Poly De Paz-steroid course must have been completed for 7 days  Review with patient:  If you are started on any steroids or antibiotics between now and your appointment, you must contact us because it may affect our ability to perform your procedure informed    Is patient actively being treated for cancer or immunocompromised, including the spleen having been removed? No  **For Dr. Barahona patients without spleens should have the chart sent to her**  (If YES, do NOT schedule and route to RN)    Are you able to get on and off an exam table with minimal or no assistance? Yes  (If NO, do NOT schedule and route to RN)  Are you able to roll over and lay on your stomach with minimal or no assistance? Yes  (If NO, do NOT schedule and route to RN)         Any allergies to contrast dye, iodine, shellfish, or numbing and steroid medications? No  (If so, inform nursing and note in scheduling comments.)    Allergies: Penicillins; Doxycycline; and Vancomycin      Any chance of pregnancy?NO    Has the patient had a flu shot or any other vaccinations within 7 days before or after the procedure.  No       Does patient have an MRI/CT?  YES:   (SI joint, hip injections, lumbar sympathetic blocks, and stellate ganglion blocks do not require an MRI)    If so, was it done at [place]? Yes      If not, where was it done?      Was the MRI done w/in the last 3 years?  Yes   If MRI was not done at [facility], [facility] or [facility] do NOT schedule. Route to nursing.  (If pt has disc the injection can be scheduled but pt has to bring disc to appt. If they show up w/out disc the injection cannot be done)    Reminders  (please tell patient if applicable):       Instructed pt to arrive 30 minutes early for IV start if this is for a cervical procedure, ALL sympathetic (stellate ganglion, hypogastric, or lumbar sympathetic block) and all sedation procedures (RFA, spinal cord stimulation trials).  YES:     -IVs are not routinely placed for Walker and [place] cervical case       If NPO for sedation, informed patient that it is okay to take medications with sips of water (except if they are to hold blood thinners).  YES: 2 HRS   *DO take blood pressure medication if it is prescribed*      If this is for a cervical MATILDE, informed patient that aspirin needs to be held for 6 days.   Not Applicable      Do not schedule procedures requiring IV placement in the first appointment of the day or first appointment after lunch         For patients 85 or older we recommend having an adult stay w/ them for the remainder of the day.         Does the patient have any questions?        Nafisa Posadas  [facility]

## 2017-01-16 NOTE — TELEPHONE ENCOUNTER
Parker,  We book out farther than that.  Dr. Campos put an order in, and so you should be getting a phone call to schedule.  I do not do cervical epidural steroid injections- but my partner does.  You could also call [phone] to schedule    Jessica Milligan MD  [facility]

## 2017-01-16 NOTE — TELEPHONE ENCOUNTER
My chart message from pt:    Dr. Campos would like me to get a C5-6 epidural steriod njection so I was wondering if you could do it on Tuesday Jan 17th 2017 since I have a Physical therpy appointmennt that day at 11:20 am so if you can let us know thanks again     Parker    The 17th is a Tuesday and on Tuesday Dr. Milligan is at [place] seeing patients all day and unfortunately does not do injections that day. I have sent her this message as well.     Karo Nuñez, RN, [facility]  Pain Clinic Care Coordinator

## 2017-01-17 ENCOUNTER — THERAPY VISIT (OUTPATIENT)
Dept: PHYSICAL THERAPY | Facility: CLINIC | Age: 47
End: 2017-01-17
Payer: COMMERCIAL

## 2017-01-17 DIAGNOSIS — M54.12 CERVICAL RADICULOPATHY: Primary | ICD-10-CM

## 2017-01-17 PROCEDURE — 97162 PT EVAL MOD COMPLEX 30 MIN: CPT | Mod: GP | Performed by: PHYSICAL THERAPIST

## 2017-01-17 NOTE — PROGRESS NOTES
[facility] Initial Evaluation  Subjective:    Parker Acevedo Sr is a 46 year old male with a cervical spine condition.  Condition occurred with:  A fall/slip.  Condition occurred: at home.  This is a new condition  Dec 20, 2016- patient reports going to sit on the couch and he tripped over a cord and fell on an outstretched arm and hit his head and neck. He has had significant pain since  .    Patient reports pain:  Cervical left side.  Radiates to:  Elbow left.  Pain is described as shooting, aching and other (popping, headaches) and is intermittent and reported as 7/10.  Associated symptoms:  Loss of motion/stiffness, headache, loss of strength, numbness and tingling (dropping objects when in L hand, tingling/numbness to L elbow). Pain is worse in the A.M..  Symptoms are exacerbated by rotating head and relieved by nothing.  Since onset symptoms are gradually worsening.  Special tests:  X-ray and MRI (ultrasound).      General health as reported by patient is fair.  Pertinent medical history includes:  Osteoporosis, rheumatoid arthritis, depression and anemia.  Medical allergies: yes (penicilin).  Other surgeries include:  Other (gastric bypass).  Current medications:  Pain medication, heparin/coumadin and anti-depressants.  Current occupation is None.        Barriers include:  None as reported by the patient.    Red flags:  Pain at rest/night and significant weakness (weakness of L hand).                      Objective:    Standing Alignment:    Cervical/Thoracic:  Forward head  Shoulder/UE:  Rounded shoulders                                  Cervical/Thoracic Evaluation  Arom wnl cervical: pain with all sustained and repeated motions, pt notes cracking/popping with all neck movements. Moderately limited with PROM of L & R cervical rotation and retraction.     AROM:  AROM Cervical:    Flexion:            Significantly limited  Extension:       Significantly limited  Rotation:          Left: significantly limited     Right: significantly limited  Side Bend:      Left: significantly limited     Right:  Significantly llimited        Cervical Myotomes:  Cervical myotomes:  strength R: avg of 96#; L: avg of 141#  C1-2 (Neck Flex): Left:  3+    Right: 3+  C3 (neck side bend): Left: 3+    Right: 3+  C4 (shrug):  Left: 4    Right: 4  C5 (Deltoid):  Left: 3    Right: 4  C6 (Biceps):  Left: 3+    Right: 4+  C7 (Triceps):  Left: 3+    Right: 4+            Cervical Palpation:    Tenderness present at Left:    Upper Trap; Levator and Erector Spinae  Tenderness present at Right:    Upper Trap; Levator and Erector Spinae      Spinal Segmental Conclusions:  Decreased mobility of C2-C7 likely due to muscle guarding. Slight improvement in pain with distraction.        Cord Sign:  Cord signs: pt reports no numbness in L UE.                                            General     ROS    Assessment/Plan:      Patient is a 46 year old male with cervical complaints.    Patient has the following significant findings with corresponding treatment plan.                Diagnosis 1:  Cervical radiculopathy  Pain -  US, electric stimulation, mechanical traction, manual therapy, self management, education and home program  Decreased ROM/flexibility - manual therapy, therapeutic exercise and home program  Decreased joint mobility - manual therapy, therapeutic exercise and home program  Decreased strength - therapeutic exercise, therapeutic activities and home program  Decreased function - therapeutic activities and home program  Impaired posture - neuro re-education and home program    Therapy Evaluation Codes:   1) History comprised of:   Personal factors that impact the plan of care:      Coping style and Overall behavior pattern.    Comorbidity factors that impact the plan of care are:      Depression and osteoporosis.     Medications impacting care: Heparin/coumadin and Pain.  2) Examination of Body Systems comprised  of:   Body structures and functions that impact the plan of care:      Cervical spine.   Activity limitations that impact the plan of care are:      Bathing, Bending, Driving, Dressing, Grasping, Sitting and Laying down.  3) Clinical presentation characteristics are:   Unstable/Unpredictable.  4) Decision-Making    Moderate complexity using standardized patient assessment instrument and/or measureable assessment of functional outcome.  Cumulative Therapy Evaluation is: Moderate complexity.    Previous and current functional limitations:  (See Goal Flow Sheet for this information)    Short term and Long term goals: (See Goal Flow Sheet for this information)     Communication ability:  Patient appears to be able to clearly communicate and understand verbal and written communication and follow directions correctly.  Treatment Explanation - The following has been discussed with the patient:   RX ordered/plan of care  Anticipated outcomes  Possible risks and side effects  This patient would benefit from PT intervention to resume normal activities.   Rehab potential is fair due to severity of pain level combined with UE weakness and overall behavior pattern.    Frequency:  2 X week, once daily  Duration:  for 6 weeks  Discharge Plan:  Achieve all LTG.  Independent in home treatment program.  Reach maximal therapeutic benefit.    Please refer to the daily flowsheet for treatment today, total treatment time and time spent performing 1:1 timed codes.

## 2017-01-24 ENCOUNTER — RADIOLOGY INJECTION OFFICE VISIT (OUTPATIENT)
Dept: PALLIATIVE MEDICINE | Facility: CLINIC | Age: 47
End: 2017-01-24
Payer: COMMERCIAL

## 2017-01-24 ENCOUNTER — CARE COORDINATION (OUTPATIENT)
Dept: CARE COORDINATION | Facility: CLINIC | Age: 47
End: 2017-01-24

## 2017-01-24 ENCOUNTER — RADIANT APPOINTMENT (OUTPATIENT)
Dept: RADIOLOGY | Facility: CLINIC | Age: 47
End: 2017-01-24
Attending: ANESTHESIOLOGY
Payer: COMMERCIAL

## 2017-01-24 ENCOUNTER — MYC REFILL (OUTPATIENT)
Dept: FAMILY MEDICINE | Facility: CLINIC | Age: 47
End: 2017-01-24

## 2017-01-24 VITALS — HEART RATE: 60 BPM | DIASTOLIC BLOOD PRESSURE: 75 MMHG | OXYGEN SATURATION: 100 % | SYSTOLIC BLOOD PRESSURE: 116 MMHG

## 2017-01-24 DIAGNOSIS — M47.812 CERVICAL FACET JOINT SYNDROME: Primary | ICD-10-CM

## 2017-01-24 DIAGNOSIS — G89.18 PAIN FOLLOWING SURGERY OR PROCEDURE: ICD-10-CM

## 2017-01-24 DIAGNOSIS — M47.12 CERVICAL SPONDYLOSIS WITH MYELOPATHY: ICD-10-CM

## 2017-01-24 DIAGNOSIS — M54.12 CERVICAL RADICULAR PAIN: ICD-10-CM

## 2017-01-24 DIAGNOSIS — L08.9 SKIN INFECTION: ICD-10-CM

## 2017-01-24 DIAGNOSIS — R20.8 SKIN PAIN: ICD-10-CM

## 2017-01-24 PROCEDURE — 64490 INJ PARAVERT F JNT C/T 1 LEV: CPT | Mod: LT | Performed by: ANESTHESIOLOGY

## 2017-01-24 PROCEDURE — 64491 INJ PARAVERT F JNT C/T 2 LEV: CPT | Mod: LT | Performed by: ANESTHESIOLOGY

## 2017-01-24 RX ORDER — MUPIROCIN 20 MG/G
OINTMENT TOPICAL 3 TIMES DAILY
Qty: 30 G | Refills: 0 | Status: SHIPPED | OUTPATIENT
Start: 2017-01-24 | End: 2017-09-16

## 2017-01-24 RX ORDER — LIDOCAINE/PRILOCAINE 2.5 %-2.5%
CREAM (GRAM) TOPICAL PRN
Qty: 30 G | Refills: 0 | Status: SHIPPED | OUTPATIENT
Start: 2017-01-24 | End: 2017-02-27

## 2017-01-24 ASSESSMENT — PAIN SCALES - GENERAL
PAINLEVEL: EXTREME PAIN (8)
PAINLEVEL: MILD PAIN (2)

## 2017-01-24 NOTE — NURSING NOTE
"    Chief Complaint   Patient presents with     Pain       Initial /72 mmHg  Pulse 64 Estimated body mass index is 24.17 kg/(m^2) as calculated from the following:    Height as of 1/12/17: 1.803 m (5' 11\").    Weight as of 1/12/17: 78.563 kg (173 lb 3.2 oz).  BP completed using cuff size: regular  Injection intake:    If this procedure is requiring IV sedation has patient been NPO for 6  Hours? NA    Is patient on coumadin, plavix or other prescribed blood thinner?   No    If patient is on coumadin was it held for 5 days?   NA    If patient is on plavix was it held for 7 days?    NA     Does patient take aspirin?  No    If this is for a cervical procedure and patient is on aspirin has it been held for 6 days?   NA    Any allergies to contrast dye, iodine, steroid and/or numbing medications?  NO    Is patient currently taking antibiotics or have an active infection?  NO    Does patient have a ? Yes       Is patient pregnant or breastfeeding?  NO    Are the vital signs normal?  Yes    Angeline Calvert CMA (AAMA)          "

## 2017-01-24 NOTE — TELEPHONE ENCOUNTER
Message from MyChart:  Original authorizing provider: MD Parker Camara Sr would like a refill of the following medications:  mupirocin (BACTROBAN) 2 % ointment [Esteban Daly MD]  morphine 0.1% in intrasite topical gel [Esteban Daly MD]  lidocaine-prilocaine (EMLA) cream [Esteban Daly MD]    Preferred pharmacy: [place], [place] - [address] [facility]    Comment:

## 2017-01-24 NOTE — PROGRESS NOTES
Clinic Care Coordination Contact  Care Team Conversations    Clinical Data:   Pt had Injection in neck for pain management  Doing oupt PT  Plans for surgery on neck soon  Still waiting on disability to be processed/approved has been almost 2 years - working with  on this  Short gut syndrome is under control - no recent EDs    New medications to review:  NA    Clinical Pathway Documentation: NA    Plan: Pt is doing well at this time. RN CC will call and f/u with pt/wife in 4-6 weeks. Pt/wife encouraged to call sooner if needed.     Jyothi Byrd, RN-BSN  RN Clinic Care Coordinator  [facility]  [phone]

## 2017-01-24 NOTE — TELEPHONE ENCOUNTER
Mupirocin 2%      Last Written Prescription Date:  10/24/2016  Last Fill Quantity: 30g,   # refills: 6  Last Office Visit with [facility], [facility] or [facility] prescribing provider: 12/28/2016  Future Office visit:    Next 5 appointments (look out 90 days)     Jan 26, 2017  9:20 AM   [facility]    [address], [address]  [place] [address]   [phone]            Feb 15, 2017  1:30 PM   Return Visit with Patti Milligan MD   [facility] ([facility])    [address] [facility] [address]   [phone]            Mar 02, 2017 11:00 AM   Office Visit with Esteban Daly MD   [facility]    [address], [address]  [place] [address]   [phone]                   Routing refill request to provider for review/approval because:  Drug not on the [facility], [facility] or [facility] refill protocol or controlled substance    Morphine 0.1%      Last Written Prescription Date:  12/2/2016  Last Fill Quantity: 100g,   # refills: 0  Last Office Visit with [facility], [facility] or [facility] prescribing provider: 12/28/2216  Future Office visit:    Next 5 appointments (look out 90 days)     Jan 26, 2017  9:20 AM   [facility]    [address] [place] [address], [address]  [facility] [place] [address]   [phone]            Feb 15, 2017  1:30 PM   Return Visit with Patti Milligan MD   [facility] ([facility])    [address] [address]   [phone]            Mar 02, 2017 11:00 AM   Office Visit with Esteban Daly MD   [facility] ([facility])    [address]  [place] [address]   [phone]                   Routing refill request to provider for review/approval  because:  Drug not on the [facility], [facility] or [facility] refill protocol or controlled substance    Lidocaine-prilocaine       Last Written Prescription Date: 01/03/2017  Last Fill Quantity: 30g, # refills: 10  Last Office Visit with [facility], [facility] or [facility] prescribing provider: 12/28/2016   Next 5 appointments (look out 90 days)     Jan 26, 2017  9:20 AM   [facility]    [address], [address]  [place] [address]   [phone]            Feb 15, 2017  1:30 PM   Return Visit with Patti Milligan MD   [facility] ([facility])    [address] [address]   [phone]            Mar 02, 2017 11:00 AM   Office Visit with Esteban Daly MD   [facility]    [address]  [place] [address]   [phone]                   CREATININE   Date Value Ref Range Status   01/13/2017 0.76 0.66 - 1.25 mg/dL Final

## 2017-01-24 NOTE — PROGRESS NOTES
Pre procedure Diagnosis: cervical facet arthropathy, cervical spondylosis   Post procedure Diagnosis: Same  Procedure performed: cervical facet joint injections at C4-5 and C5-6 on the left, fluoroscopically guided, contrast controlled  Indication:  Therapeutic for pain  Anesthesia: none  Complication:  none  Operators: Drew Zelaya MD    Indications:   Parker Acevedo Sr is a 46 year old male was sent by Dr. Darren Campos for cervical injections.  The patient has a history of chronic neck pain since a fall in December with mildly increased left upper extremity symptoms - he has a history of left radial nerve injury.  Exam shows tenderness to palpation along the mid cervical articular pillars on the left and reproduction and increase of pain with extension and lateral rotation of the cervical spine to the left.  They have tried conservative treatment including medications and activity modifications.    Options/alternatives, benefits and risks were discussed with the patient including bleeding, infection, flared pain, tissue trauma, exposure to radiation, reaction to medications including seizure, spinal cord injury, paralysis, weakness, numbness and headache.     Questions were answered to his satisfaction and he wishes to proceed. Voluntary informed consent was obtained and signed.     Vitals were reviewed: Yes  /75 mmHg  Pulse 60  SpO2 100%  Allergies were reviewed:  Yes   Medications were reviewed:  Yes   Pre-procedure pain score: 7/10    Procedure:  After obtaining signed informed consent, the patient was brought into the procedure suite and was placed in a prone position on the procedure table.   A Pause for the Cause was performed.  The patient was prepped and draped in the usual sterile fashion.     Under AP fluoroscopic guidance the C4-5 and C5-6 facet joints on the left side were identified.  A total of 1 ml of 1% lidocaine was injected at each needle entry point and needle tract. Then 27  gauge 3.5 inch spinal needles were inserted and advanced under AP and lateral fluoroscopic guidance into the C4-5 and C5-6 facet joints on the left with positive pain reproduction.  Aspiration was negative at both levels.    Then,  Omnipaque 300 contrast dye was injected into each joint after negative aspiration for heme and CSF confirming appropriate needle placement.  A total of 0.5 ml of Omnipaque was used.  Omnipaque wasted:  9.5 ml.    Then each joint was injected with 1.5 ml of a combination of 0.2% Ropivicaine mixed with 40 mg of depomedrol, total injectate volume 3 ml. The needles were flushed with lidocaine 1% as they were removed.     Hemostasis was achieved, the area was cleaned, and bandaids were placed when appropriate.  The patient tolerated the procedure well, and was taken to the recovery room.    Images were saved to PACS.    Post-procedure pain score: 2/10  Follow-up includes:   -f/u phone call in one week  -f/u with referring provider    Drew Zelaya MD  [facility]

## 2017-01-24 NOTE — PATIENT INSTRUCTIONS
[facility]   Procedure Discharge Instructions    Today you saw:  Dr. Drew Zelaya     You had an:  facet joint injection      Medications used:  Lidocaine,   Ropivacaine,   Depo-medrol  Omnipaque            Be cautious when walking. Numbness and/or weakness in the lower extremities may occur up to 6-8 hours after the procedure due to effect of the local anesthetic    Do not drive for 6 hours. The effect of the local anesthetic could slow your reflexes.     You may resume your regular activities after 24 hours    Avoid strenuous activity for the first 24 hours    You may shower, however avoid swimming, tub baths or hot tubs for 24 hours following your procedure    You may have a mild to moderate increase in pain for several days following the injection.    It may take up to 14 days for the steroid medication to start working although you may feel the effect as early as a few days after the procedure.       You may use ice packs for 10-15 minutes, 3 to 4 times a day at the injection site for comfort    Do not use heat to painful areas for 6 to 8 hours. This will give the local anesthetic time to wear off and prevent you from accidentally burning your skin.     You may use anti-inflammatory medications (such as Ibuprofen or Aleve or Advil) or Tylenol for pain control if necessary    If you experience any of the following, call the pain center nursing line during work hours at [phone] or the after hours provider line at [phone]:  -Fever over 100 degree F  -Swelling, bleeding, redness, drainage, warmth at the injection site  -Progressive weakness or numbness in your legs or arms  -Loss of bowel or bladder function  -Unusual headache that is not relieved by Tylenol  -Unusual new onset of pain that is not improving      Phone #s:  Appointment line: [phone];  Nurse line: [phone]

## 2017-01-24 NOTE — MR AVS SNAPSHOT
After Visit Summary   1/24/2017    Parker Acevedo Sr    MRN: [id number]           Patient Information     Date Of Birth          1970        Visit Information        Provider Department      1/24/2017 9:15 AM Drew Woo MD [facility]        Care Instructions        [facility]   Procedure Discharge Instructions    Today you saw:  Dr. Drew Zelaya     You had an:  facet joint injection      Medications used:  Lidocaine,   Bupivacaine,   Depo-medrol  Omnipaque            Be cautious when walking. Numbness and/or weakness in the lower extremities may occur up to 6-8 hours after the procedure due to effect of the local anesthetic    Do not drive for 6 hours. The effect of the local anesthetic could slow your reflexes.     You may resume your regular activities after 24 hours    Avoid strenuous activity for the first 24 hours    You may shower, however avoid swimming, tub baths or hot tubs for 24 hours following your procedure    You may have a mild to moderate increase in pain for several days following the injection.    It may take up to 14 days for the steroid medication to start working although you may feel the effect as early as a few days after the procedure.       You may use ice packs for 10-15 minutes, 3 to 4 times a day at the injection site for comfort    Do not use heat to painful areas for 6 to 8 hours. This will give the local anesthetic time to wear off and prevent you from accidentally burning your skin.     You may use anti-inflammatory medications (such as Ibuprofen or Aleve or Advil) or Tylenol for pain control if necessary    If you experience any of the following, call the pain center nursing line during work hours at [phone] or the after hours provider line at [phone]:  -Fever over 100 degree F  -Swelling, bleeding, redness, drainage, warmth at the injection site  -Progressive weakness or numbness in your legs or  arms  -Loss of bowel or bladder function  -Unusual headache that is not relieved by Tylenol  -Unusual new onset of pain that is not improving      Phone #s:  Appointment line: [phone];  Nurse line: [phone]            Follow-ups after your visit        Your next 10 appointments already scheduled     Jan 26, 2017  9:20 AM   [facility] with Darren Campos MD   [facility]    [address] [place] [address], [address]  [facility] [address] [phone]            Jan 31, 2017 12:50 PM   AMAUR[facility] Spine with Antonio Tinoco, PT   [facility] [place] [facility] ([facility] [place])    [address]  [address]  [place] [address]   [phone]            Feb 02, 2017  2:30 PM   AMAURY Spine with Antonio Tinoco, PT   [facility] [place] [facility] ([facility] [place])    [address]  [address]  [place] [address]   [phone]            Feb 07, 2017 12:50 PM   AMAURY Spine with Antonio Tinoco, PT   [facility] [place] [facility] ([facility] [place])    [address]  [address]  [place] [address]   [phone]            Feb 09, 2017 12:20 PM   (Arrive by 12:05 PM)   RETURN BARIATRIC SURGERY with Francis Vyas MD   [facility] ([facility])    [address] [place] [address]   [phone]            Feb 09, 2017  2:30 PM   AMAURY Spine with Antonio Tinoco, PT   [facility] [place] [facility] ([facility] [place])    [address]  [address]  [place] [address]   [phone]            Feb 14, 2017 12:50 PM   AMAURY Spine with Antonio Tinoco, PT   [facility] [place] [facility] ([facility] [place])    [address]  [address]  [place] [address]   [phone]            Feb 15, 2017  1:30 PM   Return Visit with Patti Milligan MD   [facility]  ([facility] [place])    [address]  [place] [address]   [phone]            Feb 16, 2017  2:30 PM   AMAURY Spine with Antonio Tinoco PT   [facility] [place] [facility] ([facility] [place])    [address]  [address]  [place] [address]   [phone]            Mar 02, 2017 11:00 AM   Office Visit with Esteban Daly MD   [facility]    [address]  [place] [id number] [phone]           Bring a current list of meds and any records pertaining to this visit.  For Physicals, please bring immunization records and any forms needing to be filled out.  Please arrive 10 minutes early to complete paperwork.              Who to contact     If you have questions or need follow up information about today's clinic visit or your schedule please contact [facility] directly at [phone].  Normal or non-critical lab and imaging results will be communicated to you by Attention Pointhart, letter or phone within 4 business days after the clinic has received the results. If you do not hear from us within 7 days, please contact the clinic through Given.tot or phone. If you have a critical or abnormal lab result, we will notify you by phone as soon as possible.  Submit refill requests through [email or web address] or call your pharmacy and they will forward the refill request to us. Please allow 3 business days for your refill to be completed.          Additional Information About Your Visit        [email or web address] Information     [email or web address] gives you secure access to your electronic health record. If you see a primary care provider, you can also send messages to your care team and make appointments. If you have questions, please call your primary care clinic.  If you do not have a primary care provider, please call [phone] and they will assist you.        Care EveryWhere ID     This is your Care EveryWhere ID. This could  be used by other organizations to access your [facility] medical records  [id number]        Your Vitals Were     Pulse                   64            Blood Pressure from Last 3 Encounters:   01/24/17 117/72   12/31/16 124/81   12/28/16 120/62    Weight from Last 3 Encounters:   01/12/17 78.563 kg (173 lb 3.2 oz)   12/28/16 78.699 kg (173 lb 8 oz)   12/02/16 88.996 kg (196 lb 3.2 oz)              Today, you had the following     No orders found for display         Today's Medication Changes          These changes are accurate as of: 1/24/17  9:19 AM.  If you have any questions, ask your nurse or doctor.               These medicines have changed or have updated prescriptions.        Dose/Directions    mupirocin 2 % ointment   Commonly known as:  BACTROBAN   This may have changed:    - when to take this  - reasons to take this   Used for:  Skin infection        Apply topically 3 times daily   Quantity:  30 g   Refills:  6                Primary Care Provider Office Phone # Fax #    Esteban Daly [phone]       [facility] [address] [address]        Thank you!     Thank you for choosing [facility]  for your care. Our goal is always to provide you with excellent care. Hearing back from our patients is one way we can continue to improve our services. Please take a few minutes to complete the written survey that you may receive in the mail after your visit with us. Thank you!             Your Updated Medication List - Protect others around you: Learn how to safely use, store and throw away your medicines at www.disposemymeds.org.          This list is accurate as of: 1/24/17  9:19 AM.  Always use your most recent med list.                   Brand Name Dispense Instructions for use    * amphetamine-dextroamphetamine 20 MG per tablet    ADDERALL    60 tablet    Take 1 tablet (20 mg) by mouth 2 times daily       * amphetamine-dextroamphetamine 20 MG per  "tablet   Start taking on:  2/3/2017    ADDERALL    60 tablet    Take 1 tablet (20 mg) by mouth 2 times daily       * amphetamine-dextroamphetamine 20 MG per tablet   Start taking on:  3/6/2017    ADDERALL    60 tablet    Take 1 tablet (20 mg) by mouth 2 times daily       cyanocobalamin 1000 MCG/ML injection    VITAMIN B12    1 mL    Inject 1 mL (1,000 mcg) into the muscle every 30 days       dextrose 1,000 mL with sodium chloride 76.92 mEq solution     2640 mL    Inject 110 mL/hr into the vein continuous       diazepam 5 MG tablet    VALIUM    60 tablet    5 mg each morning and 5 mg at bedtime       [facility] INFUSION MANAGED PATIENT      Contact [employer] for patient specific medication information at [phone] on admission and discharge from the hospital.  Phones are answered 24 hours a day 7 days a week 365 days a year.  Providers - Choose \"CONTINUE HOME MED (no script)\" at discharge if patient treatment with home infusion will continue.       fentaNYL 50 mcg/hr 72 hr patch    DURAGESIC    15 patch    Place 1 patch onto the skin every 48 hours [place] ONLY. Fill on/after 1/23/17 to start on/after 1/25/17       lidocaine-prilocaine cream    EMLA    30 g    Apply topically as needed for moderate pain       morphine 0.1% in intrasite topical gel     100 g    Apply as needed prior to accessing the port site.       mupirocin 2 % ointment    BACTROBAN    30 g    Apply topically 3 times daily       Needle (Disp) 27G X 1/2\" Misc    BD DISP NEEDLES    3 each    1 Device every 30 days Use for cyanocobalamin injection once q 30 days.       nystatin-triamcinolone cream    MYCOLOG II    60 g    Apply topically 2 times daily as needed       ondansetron 8 MG ODT tab    ZOFRAN-ODT    90 tablet    Take 1 tablet (8 mg) by mouth every 8 hours as needed for nausea       * order for DME     12 each    Injection Supplies for Vitamin B12: 3cc syringes w/ 27 gauge needles, 1/2 inch length       * order for DME "     4 each    Equipment being ordered: Bilateral knee high chronic venous insufficiency stockings--  mild-moderate pressures.       oxyCODONE 5 MG/5ML solution    ROXICODONE    1350 mL    Take 10-15 mLs (10-15 mg) by mouth every 4 hours as needed for moderate to severe pain Max of 45mg per day. Fill on/after 1/11/17 not to start till 1/13/17.       polyethylene glycol powder    MIRALAX    510 g    Take 17 g (1 capful) by mouth daily as needed       senna-docusate 8.6-50 MG per tablet    SENOKOT-S;PERICOLACE    120 tablet    Take 1-2 tablets by mouth 2 times daily as needed for constipation       sucralfate 1 GM/10ML suspension    CARAFATE    1200 mL    Take 10 mLs (1 g) by mouth 4 times daily       vitamin D 78628 UNIT capsule    ERGOCALCIFEROL    12 capsule    Take 1 capsule (50,000 Units) by mouth every 7 days       * Notice:  This list has 5 medication(s) that are the same as other medications prescribed for you. Read the directions carefully, and ask your doctor or other care provider to review them with you.

## 2017-01-26 ENCOUNTER — TELEPHONE (OUTPATIENT)
Dept: FAMILY MEDICINE | Facility: CLINIC | Age: 47
End: 2017-01-26

## 2017-01-26 NOTE — TELEPHONE ENCOUNTER
Reason for call:  Form  Reason for Call:  Form, our goal is to have forms completed with 72 hours, however, some forms may require a visit or additional information.    Type of letter, form or note:  Medical    Who is the form from?: Kerri      Where did the form come from: form was faxed in    What clinic location was the form placed at?: [facility] - [phone]    Where the form was placed: 's in box     What number is listed as a contact on the form?: [phone]       Additional comments: Fax back to [phone]    Call taken on 1/26/2017 at 12:54 PM by Sesar Willett

## 2017-01-27 ENCOUNTER — MYC MEDICAL ADVICE (OUTPATIENT)
Dept: FAMILY MEDICINE | Facility: CLINIC | Age: 47
End: 2017-01-27

## 2017-01-31 ENCOUNTER — TELEPHONE (OUTPATIENT)
Dept: RADIOLOGY | Facility: CLINIC | Age: 47
End: 2017-01-31

## 2017-01-31 NOTE — TELEPHONE ENCOUNTER
Patient had cervical facet joint injections at C4-5 and C5-6 on the left, fluoroscopically guided, contrast controlled on 1/24/17.  Called patient for an update.      Pt reported the following details:  Still stiff and sore but pain has decreased by about 30%.   Told patient that the information will be forwarded to his provider.  Also explained that, if a steroid medication was used, it could take up to 14 days to feel the full effect and if pt has any further questions or concerns pt should call the nurse line at [phone].

## 2017-02-01 ENCOUNTER — MYC MEDICAL ADVICE (OUTPATIENT)
Dept: PALLIATIVE MEDICINE | Facility: CLINIC | Age: 47
End: 2017-02-01

## 2017-02-01 DIAGNOSIS — R10.9 CHRONIC ABDOMINAL PAIN: ICD-10-CM

## 2017-02-01 DIAGNOSIS — Z79.891 ENCOUNTER FOR LONG-TERM OPIATE ANALGESIC USE: Primary | ICD-10-CM

## 2017-02-01 DIAGNOSIS — G89.29 CHRONIC ABDOMINAL PAIN: ICD-10-CM

## 2017-02-02 ENCOUNTER — OFFICE VISIT (OUTPATIENT)
Dept: NEUROSURGERY | Facility: OTHER | Age: 47
End: 2017-02-02
Payer: COMMERCIAL

## 2017-02-02 ENCOUNTER — TELEPHONE (OUTPATIENT)
Dept: NEUROSURGERY | Facility: CLINIC | Age: 47
End: 2017-02-02

## 2017-02-02 VITALS — TEMPERATURE: 98.2 F | WEIGHT: 173 LBS | BODY MASS INDEX: 24.22 KG/M2 | HEIGHT: 71 IN

## 2017-02-02 DIAGNOSIS — M54.12 CERVICAL RADICULOPATHY: Primary | ICD-10-CM

## 2017-02-02 PROCEDURE — 99213 OFFICE O/P EST LOW 20 MIN: CPT | Performed by: NEUROLOGICAL SURGERY

## 2017-02-02 RX ORDER — OXYCODONE HCL 5 MG/5 ML
10-15 SOLUTION, ORAL ORAL EVERY 4 HOURS PRN
Qty: 1350 ML | Refills: 0 | Status: SHIPPED | OUTPATIENT
Start: 2017-02-02 | End: 2017-02-20

## 2017-02-02 RX ORDER — FENTANYL 50 UG/1
1 PATCH TRANSDERMAL
Qty: 15 PATCH | Refills: 0 | Status: SHIPPED | OUTPATIENT
Start: 2017-02-02 | End: 2017-03-09

## 2017-02-02 NOTE — NURSING NOTE
"Parker Acevedo Sr is a 46 year old male who presents for:  Chief Complaint   Patient presents with     Neurologic Problem     cervical injection follow up and discuss surgery        Initial Vitals:  Temp(Src) 98.2  F (36.8  C) (Skin)  Ht 5' 11\" (1.803 m)  Wt 173 lb (78.472 kg)  BMI 24.14 kg/m2 Estimated body mass index is 24.14 kg/(m^2) as calculated from the following:    Height as of this encounter: 5' 11\" (1.803 m).    Weight as of this encounter: 173 lb (78.472 kg).. Body surface area is 1.98 meters squared. BP completed using cuff size: NA (Not Taken)  Data Unavailable    Do you feel safe in your environment?  Yes  Do you need any refills today? No    Nursing Comments:       5 min nursing intake time  Rene Daniel    Discharge plan:    nursing discharge time   signature    "

## 2017-02-02 NOTE — TELEPHONE ENCOUNTER
"Received call from patient requesting refill(s) of oxyCODONE (ROXICODONE) 5 MG/5ML solution            fentaNYL (DURAGESIC) 50 mcg/hr 72 hr patch     Last picked up from pharmacy on ***    Pt last seen by prescribing provider on ***  Next appt scheduled for ***    Last urine drug screen date ***  Current opioid agreement on file (completed within the last year) {YES / NO:794017::\"Yes\"} Date of opioid agreement: ***    Processing (pick one and delete the others):      Mail to [facility]   [address] [facility] [address]    Will route to nursing pool for review and preparation of prescription(s).       "

## 2017-02-02 NOTE — NURSING NOTE
"Pre-operative Education    Education included but not limited to:  - Pre-operative physical with primary care physician within 30 days of surgical date.   - Pre-operative clearance (cardiology, hematology, etc).   - Discontinue Aspirin, NSAIDs, Diclofenac x 7 days prior to surgical date.   -Do not begin taking Non-Steroidal Anti-Inflammatory Drugs or NSAIDs (Advil,Motrin, Ibuprofen,Nuprin, Diclofenac,Meloxicam, Aleve, Celebrex, Aspirin, etc.) until 12 weeks after surgery if you had a fusion. May cause bleeding and interfere with bone healing.    -May try tylenol for pain 1000 mg three times per day for pain    -Patient is a current \"light\" smoker. Patient educated on the importance of smoking cessation and how smoking inhibits healing. Patient received handout of resources to quit smoking. Patient was offered prescription for smoking cessation. Patient informed they need to be smoke free for 30 days at time of pre op physical.    -Forms to be completed: 6 weeks     -Surgical risks: blood clots in the leg or lung, problems urinating, nerve damage, drainage from the incision, infection, stiffness.    -Preparation timeline   When to start NPO   Special bathing procedure.    -Hospital stay:   Checking in   Surgery   Recovery room   Hospital room     - Managing pain   - Likely 1-2 night hospitalization  - Post operative incisional pain x 1-2 weeks which will require pain medications and muscle relaxants.   -Do NOT drive while taking narcotic pain medication.  -Post operative incision care:   Keep your incision clean and dry at all times. OK to remove dressing on postop day 2. OK to shower on postop day 3 and allow water to run over incision, pat dry after shower.    No bathing, swimming or submerging in water. Call immediately or come to ED if any drainage occurs, or you develop new pain.   Watch for signs of infection: redness, swelling, warmth, drainage, and fever of 101 degrees or higher. Notify clinic.   - Post " operative activity limitations: 6-8 weeks, no lifting > 10 pounds, no bending, twisting, or overhead reaching.  -If a brace is required per Dr. Campos, [facility] will fit you for the brace in the hospital. Brace type and length of time to wear it will be determined by Dr. Campos.   - Follow up appointments in 6 weeks with Dr. Campos or Fernandez Mann PA-C with X-ray prior. Please call to schedule follow up appointment at [phone].   - Education book was also given to the patient for further review.      Patient verbalized understanding of above instructions. All questions were answered to the best of my ability and the patient's satisfaction. Patient advised to call with any additional questions or concerns.

## 2017-02-02 NOTE — TELEPHONE ENCOUNTER
Received call from patient requesting refill(s) of oxyCODONE (ROXICODONE) 5 MG/5ML solution                        fentaNYL (DURAGESIC) 50 mcg/hr 72 hr patch    Last picked up from pharmacy on Oxycodone on 01/11/17, Fentanyl on 01/26/17    Pt last seen by prescribing provider on 11/14/16  Next appt scheduled for 02/15/17    Last urine drug screen date 06/09/16  Current opioid agreement on file (completed within the last year) Yes Date of opioid agreement: 11/14/16    Processing (pick one and delete the others):      Mail to [place] pharmacy   [address] [address]    Will route to nursing pool for review and preparation of prescription(s).

## 2017-02-02 NOTE — PROGRESS NOTES
Dr. Campos Surgery Request - [facility]    Requested Surgery Date/Time: In the next ~2 weeks    Estimated hours needed: 2.5  Location:  [facility]   Additional Surgeon:  NoDaryl    Pre-op Diagnosis: Cervical stenosis  Procedure:  C5-6 ACDF  Laterality: Midline  Position:  Supine   Beds/Aids:  Electric Bed     Anesthesia Type:  General    Pre-Op Location:  Outpatient  Post-Op Location: Outpatient Overnight      Special Assist: C-ARM    Technique: Open  Monitoring: None    Implants:  [employer]

## 2017-02-02 NOTE — PROGRESS NOTES
46-year-old male presents with left arm pain, neck pain, and C5 6 disc herniation.  He had a fall over a couch in December.  Since that fall, he has had eight out of 10 neck pain, with sharp pain at the base of the occiput, radiating into the left arm and hand, and left hand weakness.  The arm pain and weakness of slightly improved since the fall.  The pain is made worse by standing or neck range of motion.    Returns for follow up.  Injection caused the pain to get worse.  PT not helping.         Past Medical History   Diagnosis Date     Tobacco abuse      ADHD (attention deficit hyperactivity disorder)      Gastro-oesophageal reflux disease      Hiatal hernia      Gastric ulcer, unspecified as acute or chronic, without mention of hemorrhage, perforation, or obstruction      Difficulty swallowing      Chronic abdominal pain      Short gut syndrome      Severe malnutrition (H)      TPN     Other bladder disorder      Head injury      Anxiety      Other chronic pain      Past Surgical History   Procedure Laterality Date     Hernia repair  2006     Umbilical hernia     Endoscopy  03/25/11     [facility]     Endoscopy  08/04/09     [place] Endoscopy[facility]     Endoscopy  01/05/09     [facility]     Herniorrhaphy hiatal  6/22/2012     Procedure: HERNIORRHAPHY HIATAL;;  Surgeon: Francis Vyas MD;  [facility]     Celestino en y bowel  2003     Cholecystectomy       Gastrojejunostomy  08/26/09     Extensice enterolysis, partial resect. jejunum, part. resect gastric pouch, gastrojejunostomy anastomosis     Gastrectomy  6/22/2012     Procedure: GASTRECTOMY;  Open Approach, Excise Ulcers,Partial Gastrectomy, Esophagojejunostomy, Hiatal Hernia Repair, Extensive Lysis of Adhesions and Esaphagogastrodudenoscopy.;  Surgeon: Francis Vyas MD;  [facility]: [facility]     Tonsillectomy       C gastric bypass,obese<100cm celestino-en-y  2002     lost 300 pounds      Esophagoscopy, gastroscopy, duodenoscopy (egd), combined  4/20/2011     Procedure:COMBINED ESOPHAGOSCOPY, GASTROSCOPY, DUODENOSCOPY (EGD); Surgeon:FRANCIS VYAS; [facility]     Endoscopic ultrasound upper gastrointestinal tract (gi)  4/29/2011     Procedure:ENDOSCOPIC ULTRASOUND UPPER GASTROINTESTINAL TRACT (GI); Both Procedures done Conjointly; Surgeon:NEREIDA HOUSER; [facility]     Esophagoscopy, gastroscopy, duodenoscopy (egd), combined  6/15/2011     Procedure:COMBINED ESOPHAGOSCOPY, GASTROSCOPY, DUODENOSCOPY (EGD); Surgeon:FRANCIS VYAS; [facility]     Esophagoscopy, gastroscopy, duodenoscopy (egd), combined  6/12/2013     Procedure: COMBINED ESOPHAGOSCOPY, GASTROSCOPY, DUODENOSCOPY (EGD);;  Surgeon: Francis Vyas MD;  [facility]     Hc esoph/gas reflux test w nasal imped electrode  8/5/2013     Procedure: ESOPHAGEAL IMPEDENCE FUNCTION TEST 1 HOUR OR LESS;  Surgeon: Halie Lang MD;  [facility]     Endoscopic ultrasound upper gastrointestinal tract (gi)  9/9/2013     Procedure: ENDOSCOPIC ULTRASOUND UPPER GASTROINTESTINAL TRACT (GI);  Endoscopic Ultrasound Guide Gastrostomy Tube Placement  C-arm;  Surgeon: Noe Lizarraga MD;  [facility]     Endoscopic insertion tube gastrostomy  9/9/2013     Procedure: ENDOSCOPIC INSERTION TUBE GASTROSTOMY;;  Surgeon: Francis Vyas MD;  [facility]     Laparotomy exploratory  11/22/2013     Procedure: LAPAROTOMY EXPLORATORY;  Exploratory Laparotomy, Upper Endoscopy, Left Inguinal Hernia Repair;  Surgeon: Francis Vyas MD;  [facility]     Herniorrhaphy inguinal  11/22/2013     Procedure: HERNIORRHAPHY INGUINAL;;  Surgeon: Francis Vyas MD;  [facility]     Esophagoscopy, gastroscopy, duodenoscopy (egd), combined  11/22/2013     Procedure: COMBINED ESOPHAGOSCOPY, GASTROSCOPY, DUODENOSCOPY (EGD);;  Surgeon: Francis Vyas MD;  [facility]     Esophagoscopy, gastroscopy,  duodenoscopy (egd), combined  4/30/2014     Procedure: COMBINED ESOPHAGOSCOPY, GASTROSCOPY, DUODENOSCOPY (EGD);  Surgeon: Francis Vyas MD;  Location:  GI     Appendectomy       Soft tissue surgery       Back surgery  11/3/2014     curve in the spine     Biopsy lymph node cervical N/A 2/20/2015     Procedure: BIOPSY LYMPH NODE CERVICAL;  Surgeon: Baron Scanlon MD;  [facility]     Esophagoscopy, gastroscopy, duodenoscopy (egd), combined N/A 2/20/2015     Procedure: COMBINED ESOPHAGOSCOPY, GASTROSCOPY, DUODENOSCOPY (EGD), BIOPSY SINGLE OR MULTIPLE;  Surgeon: Baron Scanlon MD;  Location[facility]     Soft tissue surgery       Esophagoscopy, gastroscopy, duodenoscopy (egd), combined N/A 9/30/2015     Procedure: COMBINED ESOPHAGOSCOPY, GASTROSCOPY, DUODENOSCOPY (EGD);  Surgeon: Francis Vyas MD;  Location:  GI     Social History     Social History     Marital Status:      Spouse Name: Rose     Number of Children: 1     Years of Education: N/A     Occupational History     Not on file.     Social History Main Topics     Smoking status: Light Tobacco Smoker -- 0.10 packs/day for 3 years     Types: Cigarettes     Smokeless tobacco: Current User      Comment: I use an e cig every now and than     Alcohol Use: No      Comment: quit      Drug Use: No     Sexual Activity:     Partners: Female     Birth Control/ Protection: None      Comment: no protection     Other Topics Concern     Parent/Sibling W/ Cabg, Mi Or Angioplasty Before 65f 55m? No     Social History Narrative     Family History   Problem Relation Age of Onset     DIABETES Maternal Uncle      GASTROINTESTINAL DISEASE Mother      Crohns disease     Anxiety Disorder Mother      Thyroid Disease Mother      Grave's disease     CANCER Father      ear cancer-skin cancer/melanoma     Breast Cancer Maternal Grandmother      Breast Cancer Other      Hypertension No family hx of      Hyperlipidemia No family hx of      CEREBROVASCULAR  "DISEASE No family hx of      Breast Cancer No family hx of      Prostate Cancer No family hx of      Depression No family hx of      Anesthesia Reaction No family hx of      Asthma No family hx of      OSTEOPOROSIS No family hx of      Genetic Disorder No family hx of      Obesity No family hx of      MENTAL ILLNESS No family hx of      Substance Abuse No family hx of         ROS: 10 point ROS neg other than the symptoms noted above in the HPI.    Physical Exam  Temp(Src) 98.2  F (36.8  C) (Skin)  Ht 1.803 m (5' 11\")  Wt 78.472 kg (173 lb)  BMI 24.14 kg/m2  HEENT:  Normocephalic, atraumatic.  PERRLA.  EOM s intact.  Visual fields full to gross exam  Neck:  Supple, non-tender, without lymphadenopathy.  Heart:  No peripheral edema  Lungs:  No SOB  Abdomen:  Non-distended.   Skin:  Warm and dry.  Extremities:  No edema, cyanosis or clubbing.    NEUROLOGICAL EXAMINATION:     Mental status:  Alert and Oriented x 3, speech is fluent.  Cranial nerves:  II-XII intact.   Motor:  Strength is 5/5 throughout the upper and lower extremities  Shoulder Abduction:  Right:  5/5   Left:  4/5  Biceps:                      Right:  5/5   Left:  5/5  Triceps:                     Right:  5/5   Left:  5/5  Wrist Extensors:       Right:  5/5   Left:  5/5  Wrist Flexors:           Right:  5/5   Left:  5/5  interosseus :            Right:  5/5   Left:  4+/5   Hip Flexor:                Right: 5/5  Left:  5/5  Hip Adductor:             Right:  5/5  Left:  5/5  Hip Abductor:             Right:  5/5  Left:  5/5  Gastroc Soleus:        Right:  5/5  Left:  5/5  Tib/Ant:                      Right:  5/5  Left:  5/5  EHL:                     Right:  5/5  Left:  5/5  Sensation:  Intact  Reflexes:  Negative Babinski.  Negative Clonus.  Negative Johnson's.  Coordination:  Smooth finger to nose testing.   Negative pronator drift.  Smooth tandem walking.    A/P:  46-year-old male presents with left arm pain, neck pain, and C5 6 disc herniation    I " had a lengthy discussion with the patient, reviewing the history, symptoms and imaging  Has failed to improve with non-operative management  Wishes to proceed with surgery  Will plan for C5-6 ACDF  Risks and benefits discussed in detail

## 2017-02-02 NOTE — MR AVS SNAPSHOT
After Visit Summary   2/2/2017    Parker Acevedo Sr    MRN: [id number]           Patient Information     Date Of Birth          1970        Visit Information        Provider Department      2/2/2017 9:20 AM Darren Campos MD [facility]        Today's Diagnoses     Cervical radiculopathy    -  1       Care Instructions    Surgery scheduled at [facility] for C5-6 ACDF (anterior cervical discectomy and fusion)       Pre-Operative:  -Surgical risks: blood clots in the leg or lung, problems urinating, nerve damage, drainage from the incision, infection, stiffness.  - Pre-operative physical with primary care physician within 30 days of surgical date.   -Stop all solid foods 8 hours before surgery.  -Keep drinking clear liquids until 4 hours before surgery. Clear liquids include water, clear juice, black coffee, or clear tea without milk, Gatorade, clear soda.   -Shower procedure: Please shower with antibacterial soap the night before surgery and the morning of surgery. Refer to information sheet in folder.   - Discontinue Aspirin, NSAIDs (Advil/Ibuprofen, Naproxen,Nuprin, Diclofenac,Meloxicam, Aleve, Celebrex) x 7 days prior to surgical date. Do not begin taking until 12 weeks after surgery. May cause bleeding and interfere with bone healing.  - May try Tylenol for pain 1000 mg three times per day for pain.      Post-Operative:  -1-2 night hospitalization.   - Post operative incisional pain x 1-2 weeks which will require pain medications and muscle relaxants. You will receive medication upon discharge.  -Do NOT drive while taking narcotic pain medication.  -Post operative incision care- Watch for signs of infection: redness, swelling, warmth, drainage, and fever of 101 degrees or higher. Notify clinic [phone].  -No submerging incision in water such as pools, hot tubs, baths for at least 8 weeks or until incision is healed. Showers are fine.   - Post  operative activity limitations: 6-8 weeks, no lifting > 10 pounds, no bending, twisting, or overhead reaching.  -If a brace is required per Dr. Campos, [facility] will fit you for the brace in the hospital. Brace type and length of time to wear it will be determined by Dr. Campos.   -If you are currently employed, you will need to be off work for 4-6 weeks for post op recovery and healing.  - Follow up appointments: 6 week post op follow up visit with Dr. Campos or Fernandez Mann PA-C with x-ray prior to appointment. Please call to schedule follow up appointment at [phone].              Follow-ups after your visit        Your next 10 appointments already scheduled     Feb 02, 2017  2:30 PM   AMA[facility] Spine with Antonio Tinoco PT   [facility] [place] [facility] ([facility] [place])    [address]  [place] [address]   [phone]            Feb 09, 2017 12:20 PM   (Arrive by 12:05 PM)   RETURN BARIATRIC SURGERY with Francis Vyas MD   [facility] ([facility])    [address] [place] [address]   [phone]            Feb 09, 2017  2:30 PM   AMAURY Spine with Antonio Tinoco PT   [facility] [place] [facility] ([facility] [place])    [address]  [place] [address]   [phone]            Feb 14, 2017 12:50 PM   AMAURY Spine with Antonio Tinoco PT   [facility] [place] [facility] ([facility] [place])    [address]  [place] [address]   [phone]            Feb 15, 2017  1:30 PM   Return Visit with Patti Milligan MD   [facility] [place] ([facility] [place])    [address]  [place] [address]   [phone]            Feb 16, 2017  2:30 PM   AMAURY Spine with Antonio Tinoco PT   [facility] [place] [facility] ([facility] [place])    [address]  [place] [address]   [phone]            Mar 02, 2017 11:00 AM   Office Visit with Esteban Daly MD   [facility] [place] ([facility] [place])    [address]  [place] [id number] [phone]           Bring a current list of meds and any records pertaining to this visit.  For Physicals, please bring immunization records and any forms needing to be filled out.  Please arrive 10 minutes early to complete paperwork.              Who to contact     If you have questions or need follow up information about today's clinic visit or your schedule please contact [facility] directly at [phone].  Normal or non-critical lab and imaging results will be communicated to you by MyChart, letter or phone within 4 business days after the clinic has received the results. If you do not hear from us within 7 days, please contact the clinic through Blurbhart or phone. If you have a critical or abnormal lab result, we will notify you by phone as soon as possible.  Submit refill requests through OSSIANIX or call your pharmacy and they will forward the refill request to us. Please allow 3 business days for your refill to be completed.          Additional Information About Your Visit        MyCharStance Information     OSSIANIX gives you secure access to your electronic health record. If you see a primary care provider, you can also send messages to your care team and make appointments. If you have questions, please call your primary care clinic.  If you do not have a primary care provider, please call [phone] and they will assist you.        Care EveryWhere ID     This is your Care EveryWhere ID. This could be used by other organizations to access your [place] medical records  [id number]        Your Vitals Were     Temperature Height BMI (Body Mass Index)             98.2  F (36.8  C) (Skin) 5' 11\" (1.803 m) 24.14 kg/m2          Blood Pressure from Last 3 Encounters: "   01/24/17 116/75   12/31/16 124/81   12/28/16 120/62    Weight from Last 3 Encounters:   02/02/17 173 lb (78.472 kg)   01/12/17 173 lb 3.2 oz (78.563 kg)   12/28/16 173 lb 8 oz (78.699 kg)              Today, you had the following     No orders found for display       Primary Care Provider Office Phone # Fax #    Esteban Daly [phone]       [facility] [place] [address] [address]        Thank you!     Thank you for choosing [facility]  for your care. Our goal is always to provide you with excellent care. Hearing back from our patients is one way we can continue to improve our services. Please take a few minutes to complete the written survey that you may receive in the mail after your visit with us. Thank you!             Your Updated Medication List - Protect others around you: Learn how to safely use, store and throw away your medicines at www.disposemymeds.org.          This list is accurate as of: 2/2/17  9:48 AM.  Always use your most recent med list.                   Brand Name Dispense Instructions for use    * amphetamine-dextroamphetamine 20 MG per tablet    ADDERALL    60 tablet    Take 1 tablet (20 mg) by mouth 2 times daily       * amphetamine-dextroamphetamine 20 MG per tablet   Start taking on:  2/3/2017    ADDERALL    60 tablet    Take 1 tablet (20 mg) by mouth 2 times daily       * amphetamine-dextroamphetamine 20 MG per tablet   Start taking on:  3/6/2017    ADDERALL    60 tablet    Take 1 tablet (20 mg) by mouth 2 times daily       cyanocobalamin 1000 MCG/ML injection    VITAMIN B12    1 mL    Inject 1 mL (1,000 mcg) into the muscle every 30 days       dextrose 1,000 mL with sodium chloride 76.92 mEq solution     2640 mL    Inject 110 mL/hr into the vein continuous       diazepam 5 MG tablet    VALIUM    60 tablet    5 mg each morning and 5 mg at bedtime       [facility] INFUSION MANAGED PATIENT      Contact [facility]  "Infusion for patient specific medication information at [phone] on admission and discharge from the hospital.  Phones are answered 24 hours a day 7 days a week 365 days a year.  Providers - Choose \"CONTINUE HOME MED (no script)\" at discharge if patient treatment with home infusion will continue.       fentaNYL 50 mcg/hr 72 hr patch    DURAGESIC    15 patch    Place 1 patch onto the skin every 48 hours MYLAN BRAND ONLY. Fill on/after 1/23/17 to start on/after 1/25/17       lidocaine-prilocaine cream    EMLA    30 g    Apply topically as needed for moderate pain       morphine 0.1% in intrasite topical gel     100 g    Apply as needed prior to accessing the port site.       mupirocin 2 % ointment    BACTROBAN    30 g    Apply topically 3 times daily       Needle (Disp) 27G X 1/2\" Misc    BD DISP NEEDLES    3 each    1 Device every 30 days Use for cyanocobalamin injection once q 30 days.       nystatin-triamcinolone cream    MYCOLOG II    60 g    Apply topically 2 times daily as needed       ondansetron 8 MG ODT tab    ZOFRAN-ODT    90 tablet    Take 1 tablet (8 mg) by mouth every 8 hours as needed for nausea       * order for DME     12 each    Injection Supplies for Vitamin B12: 3cc syringes w/ 27 gauge needles, 1/2 inch length       * order for DME     4 each    Equipment being ordered: Bilateral knee high chronic venous insufficiency stockings--  mild-moderate pressures.       oxyCODONE 5 MG/5ML solution    ROXICODONE    1350 mL    Take 10-15 mLs (10-15 mg) by mouth every 4 hours as needed for moderate to severe pain Max of 45mg per day. Fill on/after 1/11/17 not to start till 1/13/17.       polyethylene glycol powder    MIRALAX    510 g    Take 17 g (1 capful) by mouth daily as needed       senna-docusate 8.6-50 MG per tablet    SENOKOT-S;PERICOLACE    120 tablet    Take 1-2 tablets by mouth 2 times daily as needed for constipation       sucralfate 1 GM/10ML suspension    CARAFATE    1200 mL    Take 10 mLs " (1 g) by mouth 4 times daily       vitamin D 56166 UNIT capsule    ERGOCALCIFEROL    12 capsule    Take 1 capsule (50,000 Units) by mouth every 7 days       * Notice:  This list has 5 medication(s) that are the same as other medications prescribed for you. Read the directions carefully, and ask your doctor or other care provider to review them with you.

## 2017-02-02 NOTE — TELEPHONE ENCOUNTER
Surgery Scheduled    Date of Surgery 2/15/17 Time of Surgery 10:30am  Procedure: C5-6 ACDF  Hospital/Surgical Facility: [facility]  Surgeon: Dr. aCmpos  Type of Anesthesia : gneral  Pre-op 2/07/17 with Lizbeth Santos  Surgery Scheduler

## 2017-02-02 NOTE — TELEPHONE ENCOUNTER
Routed to the nursing pool and to the MA pool to process refill(s).    Demetrice Yeh, RN-BSN  [facility]

## 2017-02-02 NOTE — TELEPHONE ENCOUNTER
Signed Prescriptions:                        Disp   Refills    oxyCODONE (ROXICODONE) 5 MG/5ML solution   1350 mL0        Sig: Take 10-15 mLs (10-15 mg) by mouth every 4 hours as           needed for moderate to severe pain Max of 45mg           per day. Fill on/after 2/8/17 not to start till           2/10/17.  Authorizing Provider: BRANDYN JENKINS    fentaNYL (DURAGESIC) 50 mcg/hr 72 hr patch 15 pat*0        Sig: Place 1 patch onto the skin every 48 hours MYLAN           BRAND ONLY. Fill on/after 2/23/17 to start           on/after 2/25/17  Authorizing Provider: BRANDYN JENKINS MD  [facility]

## 2017-02-02 NOTE — PATIENT INSTRUCTIONS
Surgery scheduled at [facility] for C5-6 ACDF (anterior cervical discectomy and fusion)       Pre-Operative:  -Surgical risks: blood clots in the leg or lung, problems urinating, nerve damage, drainage from the incision, infection, stiffness.  - Pre-operative physical with primary care physician within 30 days of surgical date.   -Stop all solid foods 8 hours before surgery.  -Keep drinking clear liquids until 4 hours before surgery. Clear liquids include water, clear juice, black coffee, or clear tea without milk, Gatorade, clear soda.   -Shower procedure: Please shower with antibacterial soap the night before surgery and the morning of surgery. Refer to information sheet in folder.   - Discontinue Aspirin, NSAIDs (Advil/Ibuprofen, Naproxen,Nuprin, Diclofenac,Meloxicam, Aleve, Celebrex) x 7 days prior to surgical date. Do not begin taking until 12 weeks after surgery. May cause bleeding and interfere with bone healing.  - May try Tylenol for pain 1000 mg three times per day for pain.      Post-Operative:  -1-2 night hospitalization.   - Post operative incisional pain x 1-2 weeks which will require pain medications and muscle relaxants. You will receive medication upon discharge.  -Do NOT drive while taking narcotic pain medication.  -Post operative incision care- Watch for signs of infection: redness, swelling, warmth, drainage, and fever of 101 degrees or higher. Notify clinic [phone].  -No submerging incision in water such as pools, hot tubs, baths for at least 8 weeks or until incision is healed. Showers are fine.   - Post operative activity limitations: 6-8 weeks, no lifting > 10 pounds, no bending, twisting, or overhead reaching.  -If a brace is required per Dr. Campos, [facility] will fit you for the brace in the hospital. Brace type and length of time to wear it will be determined by Dr. Campos.   -If you are currently employed, you will need to be off work for 4-6 weeks for post op recovery  and healing.  - Follow up appointments: 6 week post op follow up visit with Dr. Campos or Fernandez Mann PA-C with x-ray prior to appointment. Please call to schedule follow up appointment at [phone].

## 2017-02-02 NOTE — TELEPHONE ENCOUNTER
Medication refill information reviewed.     Last due:    Fentanyl:  Fill on/after 1/23/17 to start on/after 1/25/17 but pt picked up on 1/26/17  Oxycodone:  Fill on/after 1/11/17 not to start till 1/13/17    Due date:    Fentanyl:  2/25/17  Oxycodone:  2/10/17    Weaning instructions:  N/A    Prescriptions prepped for review.     Demetrice Yeh RN-BSN  [facility]

## 2017-02-03 ENCOUNTER — MYC MEDICAL ADVICE (OUTPATIENT)
Dept: PALLIATIVE MEDICINE | Facility: CLINIC | Age: 47
End: 2017-02-03

## 2017-02-03 NOTE — TELEPHONE ENCOUNTER
Chris from patient 2/3/17 1:52 pm  On Feb 15 2017 I will be having surgery done on my neck and I will be in the hospital for one day so I just wanted to let you Know ahead of time cause depending on the pain We might have to go up on it a little bit if you have any questions feel free to call with any questions Thank You YADIRA LivingstonN, RN  Care Coordinator  [facility] [place]

## 2017-02-03 NOTE — TELEPHONE ENCOUNTER
Signed Rx's mailing from [place] on 02/03/17. Mailing to [facility] [place]. Left message with wife.

## 2017-02-06 ENCOUNTER — TELEPHONE (OUTPATIENT)
Dept: FAMILY MEDICINE | Facility: CLINIC | Age: 47
End: 2017-02-06

## 2017-02-06 NOTE — TELEPHONE ENCOUNTER
Patient's wife called. Asked if the Provider could complete the majority of the form prior to the appt on 2/9, so that the final items can be completed at the visit and they can take the form with them. They need the form for another appt on 2/9.    Forms in [facility] inbox

## 2017-02-06 NOTE — TELEPHONE ENCOUNTER
[facility] phone call message- patient requests medication or medication refill:    If this is a refill request, has the caller requested the refill from the pharmacy already? No  Name of the pharmacy and phone number for the current request:  [facility] [phone]    Name of the medication requested: Morphine Compound     Other request: Pt is out     OK to leave the result message on voice mail or with a family member? YES    Call taken on 2/6/2017 at 12:34 PM by Jyothi Perez

## 2017-02-06 NOTE — TELEPHONE ENCOUNTER
Per patient InnerWirelesst message:    Francisco Canales.  Thank for the update.  Be sure to give us an update upon discharge and let us know how many days you are there so we can do the math on your due dates.    Routed to Dr. Milligan as an fyi.    Demetrice Yeh, RN-BSN  [facility]

## 2017-02-08 NOTE — PROGRESS NOTES
SUBJECTIVE:                                                    Parker Acevedo Sr is a 46 year old male who presents to clinic today for the following health issues:        Disability paperwork       Type / Location of Pain: Neck  Analgesia/pain control:       Recent changes:  same      Overall control: Tolerable with discomfort  Activity level/function:      Daily activities:  Able to do all daily activities    Work:  Unable to work and not applicable  Adverse effects:  No  Adherance    Taking medication as directed?  Yes    Participating in other treatments: Seeing a pain specialist  Risk Factors:    Sleep:  Poor    Mood/anxiety:  controlled    Recent family or social stressors:  none noted    Other aggravating factors: none  PHQ-9 SCORE 6/21/2016 2/9/2017 2/13/2017   Total Score - - -   Total Score MyChart - - -   Total Score 3 0 8     CIERRA-7 SCORE 6/21/2016 2/9/2017 2/13/2017   Total Score - - -   Total Score - - -   Total Score 13 7 2     Encounter-Level CSA - 11/14/2016:                 Controlled Substance Agreement - Scan on 11/25/2016 12:17 PM : CONTROLLED SUBSTANCE AGREEMENT (below)          Encounter-Level CSA - 11/14/2016:                 Controlled Substance Agreement - Scan on 12/28/2015 12:33 PM : CONTROLLED SUBSTANCE AGREEMENT 12/10/15 (below)          Encounter-Level CSA - 11/14/2016:                 Controlled Substance Agreement - Scan on 11/24/2014  7:30 AM : Controlled Medication Agreement 10/20/14 (below)                 Amount of exercise or physical activity: None    Problems taking medications regularly: No    Medication side effects: none  Diet: DPN  Disability paperwork to be filled out.     Has ADHD, anxiety, chronic pain in addition to chronic physical disease of short gut syndrome, with chronic malnourishment.     Reviewed physical capability with patient and wife with extensive chart review as well as previous and current mental health status.        Problem list and histories  reviewed & adjusted, as indicated.  Additional history: as documented    Patient Active Problem List   Diagnosis     Peptic ulcer disease     Gastric bypass status for obesity     CARDIOVASCULAR SCREENING; LDL GOAL LESS THAN 160     Chronic abdominal pain     Ulcer (H)     Vomiting     Anemia     ADHD (attention deficit hyperactivity disorder), inattentive type     Vitamin B12 deficiency without anemia     Thiamine deficiency     Dehydration     Dysphagia     Weight loss, non-intentional     Malnutrition (H)     Chronic anxiety     Constipation     Bile reflux esophagitis     Former smoker     Vitamin D deficiency     Iron deficiency     Health Care Home     Chronic pain     Coagulase negative staph bacteremia associated with intravascular line (H)     Insomnia     Positive blood culture     Fungemia     Chronic nausea     Gastrostomy tube in place (H)     Cervical radiculopathy     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)     Severe malnutrition (H)     Short gut syndrome     Anxiety     ADHD (attention deficit hyperactivity disorder)     Past Surgical History   Procedure Laterality Date     Hernia repair  2006     Umbilical hernia     Endoscopy  03/25/11     EGD, [facility]     Endoscopy  08/04/09     Upper Endoscopy, [facility]     Endoscopy  01/05/09     Upper Endoscopy, [facility]     Herniorrhaphy hiatal  6/22/2012     Procedure: HERNIORRHAPHY HIATAL;;  Surgeon: Francis Vyas MD;  Location: [facility]     Celestino en y bowel  2003     Cholecystectomy       Gastrojejunostomy  08/26/09     Extensice enterolysis, partial resect. jejunum, part. resect gastric pouch, gastrojejunostomy anastomosis     Gastrectomy  6/22/2012     Procedure: GASTRECTOMY;  Open Approach, Excise Ulcers,Partial Gastrectomy, Esophagojejunostomy, Hiatal Hernia Repair, Extensive Lysis of Adhesions and Esaphagogastrodudenoscopy.;  Surgeon: Francis Vyas MD;  Location: [facility] OR     Tonsillectomy       C gastric  bypass,obese<100cm gm-en-y  2002     lost 300 pounds     Esophagoscopy, gastroscopy, duodenoscopy (egd), combined  4/20/2011     Procedure:COMBINED ESOPHAGOSCOPY, GASTROSCOPY, DUODENOSCOPY (EGD); Surgeon:FRANCIS VYAS; Location:[facility]     Endoscopic ultrasound upper gastrointestinal tract (gi)  4/29/2011     Procedure:ENDOSCOPIC ULTRASOUND UPPER GASTROINTESTINAL TRACT (GI); Both Procedures done Conjointly; Surgeon:NEREIDA HOUSER; Location:[facility]     Esophagoscopy, gastroscopy, duodenoscopy (egd), combined  6/15/2011     Procedure:COMBINED ESOPHAGOSCOPY, GASTROSCOPY, DUODENOSCOPY (EGD); Surgeon:FRANCIS VYAS; [facility]     Esophagoscopy, gastroscopy, duodenoscopy (egd), combined  6/12/2013     Procedure: COMBINED ESOPHAGOSCOPY, GASTROSCOPY, DUODENOSCOPY (EGD);;  Surgeon: Francis yVas MD;  Location: [facility]     Hc esoph/gas reflux test w nasal imped electrode  8/5/2013     Procedure: ESOPHAGEAL IMPEDENCE FUNCTION TEST 1 HOUR OR LESS;  Surgeon: Halie Lang MD;  Location: [facility]     Endoscopic ultrasound upper gastrointestinal tract (gi)  9/9/2013     Procedure: ENDOSCOPIC ULTRASOUND UPPER GASTROINTESTINAL TRACT (GI);  Endoscopic Ultrasound Guide Gastrostomy Tube Placement  C-arm;  Surgeon: Noe Lizarraga MD;  Location: [facility]     Endoscopic insertion tube gastrostomy  9/9/2013     Procedure: ENDOSCOPIC INSERTION TUBE GASTROSTOMY;;  Surgeon: Francis Vyas MD;  Location: [facility]     Laparotomy exploratory  11/22/2013     Procedure: LAPAROTOMY EXPLORATORY;  Exploratory Laparotomy, Upper Endoscopy, Left Inguinal Hernia Repair;  Surgeon: Francis Vyas MD;  Location: [facility]     Herniorrhaphy inguinal  11/22/2013     Procedure: HERNIORRHAPHY INGUINAL;;  Surgeon: Francis Vyas MD;  [facility]: [facility]     Esophagoscopy, gastroscopy, duodenoscopy (egd), combined  11/22/2013     Procedure: COMBINED ESOPHAGOSCOPY, GASTROSCOPY, DUODENOSCOPY (EGD);;  Surgeon: Francis Vyas  MD Lg;  Location: [facility]     Esophagoscopy, gastroscopy, duodenoscopy (egd), combined  4/30/2014     Procedure: COMBINED ESOPHAGOSCOPY, GASTROSCOPY, DUODENOSCOPY (EGD);  Surgeon: Francis Vyas MD;  Location:  GI     Appendectomy       Soft tissue surgery       Back surgery  11/3/2014     curve in the spine     Biopsy lymph node cervical N/A 2/20/2015     Procedure: BIOPSY LYMPH NODE CERVICAL;  Surgeon: Baron Scanlon MD;  [facility]: [facility]     Esophagoscopy, gastroscopy, duodenoscopy (egd), combined N/A 2/20/2015     Procedure: COMBINED ESOPHAGOSCOPY, GASTROSCOPY, DUODENOSCOPY (EGD), BIOPSY SINGLE OR MULTIPLE;  Surgeon: Baron Scanlon MD;  Location: [facility]     Soft tissue surgery       Esophagoscopy, gastroscopy, duodenoscopy (egd), combined N/A 9/30/2015     Procedure: COMBINED ESOPHAGOSCOPY, GASTROSCOPY, DUODENOSCOPY (EGD);  Surgeon: Francis Vyas MD;  Location:  GI       Social History   Substance Use Topics     Smoking status: Light Tobacco Smoker     Packs/day: 0.10     Years: 3.00     Types: Cigarettes     Smokeless tobacco: Current User      Comment: I use an e cig every now and than     Alcohol use No      Comment: quit      Family History   Problem Relation Age of Onset     GASTROINTESTINAL DISEASE Mother      Crohns disease     Anxiety Disorder Mother      Thyroid Disease Mother      Grave's disease     CANCER Father      ear cancer-skin cancer/melanoma     Breast Cancer Maternal Grandmother      DIABETES Maternal Uncle      Breast Cancer Other      Hypertension No family hx of      Hyperlipidemia No family hx of      CEREBROVASCULAR DISEASE No family hx of      Prostate Cancer No family hx of      Depression No family hx of      Anesthesia Reaction No family hx of      Asthma No family hx of      OSTEOPOROSIS No family hx of      Genetic Disorder No family hx of      Obesity No family hx of      MENTAL ILLNESS No family hx of      Substance Abuse No family hx of           Current Outpatient Prescriptions   Medication Sig Dispense Refill     oxyCODONE (ROXICODONE) 5 MG/5ML solution Take 10-15 mLs (10-15 mg) by mouth every 4 hours as needed for moderate to severe pain Max of 45mg per day. Fill on/after 2/8/17 not to start till 2/10/17. 1350 mL 0     fentaNYL (DURAGESIC) 50 mcg/hr 72 hr patch Place 1 patch onto the skin every 48 hours [employer] BR[employer] ONLY. Fill on/after 2/23/17 to start on/after 2/25/17 15 patch 0     mupirocin (BACTROBAN) 2 % ointment Apply topically 3 times daily 30 g 0     morphine 0.1% in intrasite topical gel Apply as needed prior to accessing the port site. 100 g 0     lidocaine-prilocaine (EMLA) cream Apply topically as needed for moderate pain 30 g 0     ondansetron (ZOFRAN-ODT) 8 MG ODT tab Take 1 tablet (8 mg) by mouth every 8 hours as needed for nausea 90 tablet 3     vitamin D (ERGOCALCIFEROL) 07739 UNIT capsule Take 1 capsule (50,000 Units) by mouth every 7 days 12 capsule 3     sucralfate (CARAFATE) 1 GM/10ML suspension Take 10 mLs (1 g) by mouth 4 times daily 1200 mL 11     diazepam (VALIUM) 5 MG tablet 5 mg each morning and 5 mg at bedtime 60 tablet 2     cyanocobalamin (VITAMIN B12) 1000 MCG/ML injection Inject 1 mL (1,000 mcg) into the muscle every 30 days 1 mL 11     [START ON 3/6/2017] amphetamine-dextroamphetamine (ADDERALL) 20 MG per tablet Take 1 tablet (20 mg) by mouth 2 times daily 60 tablet 0     amphetamine-dextroamphetamine (ADDERALL) 20 MG per tablet Take 1 tablet (20 mg) by mouth 2 times daily 60 tablet 0     dextrose 1,000 mL with sodium chloride 76.92 mEq solution Inject 110 mL/hr into the vein continuous 2640 mL 10     polyethylene glycol (MIRALAX) powder Take 17 g (1 capful) by mouth daily as needed 510 g 11     nystatin-triamcinolone (MYCOLOG II) cream Apply topically 2 times daily as needed 60 g 5     [place] [facility] PAT[facility] Contact [facility] for patient specific medication information at [phone] on  "admission and discharge from the hospital.  Phones are answered 24 hours a day 7 days a week 365 days a year.    Providers - Choose \"CONTINUE HOME MED (no script)\" at discharge if patient treatment with home infusion will continue.       senna-docusate (SENOKOT-S;PERICOLACE) 8.6-50 MG per tablet Take 1-2 tablets by mouth 2 times daily as needed for constipation 120 tablet 11     Needle, Disp, (BD DISP NEEDLES) 27G X 1/2\" MISC 1 Device every 30 days Use for cyanocobalamin injection once q 30 days. 3 each 4     amphetamine-dextroamphetamine (ADDERALL) 20 MG per tablet Take 1 tablet (20 mg) by mouth 2 times daily 60 tablet 0     order for DME Equipment being ordered: Bilateral knee high chronic venous insufficiency stockings--  mild-moderate pressures. 4 each 5     order for DME Injection Supplies for Vitamin B12: 3cc syringes w/ 27 gauge needles, 1/2 inch length 12 each 0     [DISCONTINUED] NO ACTIVE MEDICATIONS . 0 0       ROS:  Constitutional, HEENT, cardiovascular, pulmonary, gi and gu systems are negative, except as otherwise noted.    OBJECTIVE:                                                    /78  Pulse 92  Temp 99.3  F (37.4  C) (Temporal)  Resp 10  Ht 5' 11\" (1.803 m)  Wt 197 lb (89.4 kg)  BMI 27.48 kg/m2  Body mass index is 27.48 kg/(m^2).  GENERAL APPEARANCE: healthy, alert and no distress  NEURO: mentation intact and speech slightly slurred,   PSYCH: mentation appears normal, affect flat and tangential         ASSESSMENT/PLAN:                                                        ICD-10-CM    1. Short gut syndrome K91.2    2. Anxiety F41.9    3. ADHD (attention deficit hyperactivity disorder), inattentive type F90.0    4. Persistent insomnia G47.00    5. Chronic anxiety F41.9    6. Cardiomyopathy, unspecified (H) I42.9 HEART FAILURE ACTION PLAN   7. Severe malnutrition (H) E43    8. Attention deficit hyperactivity disorder (ADHD), unspecified ADHD type F90.9    9. Tobacco abuse Z72.0 TOBACCO " CESSATION - FOR HEALTH MAINTENANCE       Extensive chart review and filled out forms related to physical and mental health status ability. Abstracted and faxed.   reviewed cardiomyopathy and updated HF action plan- difficult as large fluid/weight fluctuations related to TPN.     Follow-up in March for routine follow-up with PCP.     Return to clinic with any new or worsening symptoms, and as needed.     SAILAJA Lam [facility] JOSIE

## 2017-02-09 ENCOUNTER — TELEPHONE (OUTPATIENT)
Dept: FAMILY MEDICINE | Facility: CLINIC | Age: 47
End: 2017-02-09

## 2017-02-09 ENCOUNTER — OFFICE VISIT (OUTPATIENT)
Dept: FAMILY MEDICINE | Facility: CLINIC | Age: 47
End: 2017-02-09
Payer: COMMERCIAL

## 2017-02-09 ENCOUNTER — OFFICE VISIT (OUTPATIENT)
Dept: SURGERY | Facility: CLINIC | Age: 47
End: 2017-02-09

## 2017-02-09 VITALS
WEIGHT: 174 LBS | TEMPERATURE: 98.2 F | HEART RATE: 88 BPM | DIASTOLIC BLOOD PRESSURE: 68 MMHG | HEIGHT: 71 IN | SYSTOLIC BLOOD PRESSURE: 112 MMHG | BODY MASS INDEX: 24.36 KG/M2

## 2017-02-09 VITALS
WEIGHT: 174 LBS | TEMPERATURE: 98.2 F | BODY MASS INDEX: 24.36 KG/M2 | SYSTOLIC BLOOD PRESSURE: 112 MMHG | RESPIRATION RATE: 16 BRPM | HEIGHT: 71 IN | DIASTOLIC BLOOD PRESSURE: 68 MMHG | HEART RATE: 88 BPM

## 2017-02-09 DIAGNOSIS — R53.81 PHYSICAL DECONDITIONING: ICD-10-CM

## 2017-02-09 DIAGNOSIS — E43 SEVERE MALNUTRITION (H): ICD-10-CM

## 2017-02-09 DIAGNOSIS — I43: ICD-10-CM

## 2017-02-09 DIAGNOSIS — Z79.899 HIGH RISK MEDICATION USE: ICD-10-CM

## 2017-02-09 DIAGNOSIS — R10.9 CHRONIC ABDOMINAL PAIN: ICD-10-CM

## 2017-02-09 DIAGNOSIS — G89.29 CHRONIC ABDOMINAL PAIN: ICD-10-CM

## 2017-02-09 DIAGNOSIS — Z01.818 PREOP GENERAL PHYSICAL EXAM: Primary | ICD-10-CM

## 2017-02-09 DIAGNOSIS — Z98.84 GASTRIC BYPASS STATUS FOR OBESITY: ICD-10-CM

## 2017-02-09 DIAGNOSIS — E46 MALNUTRITION (H): Primary | ICD-10-CM

## 2017-02-09 DIAGNOSIS — E46 MALNUTRITION (H): ICD-10-CM

## 2017-02-09 DIAGNOSIS — E63.9: ICD-10-CM

## 2017-02-09 DIAGNOSIS — M54.12 CERVICAL RADICULOPATHY: ICD-10-CM

## 2017-02-09 DIAGNOSIS — R20.8 SKIN PAIN: Primary | ICD-10-CM

## 2017-02-09 PROCEDURE — 99215 OFFICE O/P EST HI 40 MIN: CPT | Performed by: NURSE PRACTITIONER

## 2017-02-09 ASSESSMENT — ENCOUNTER SYMPTOMS
LOSS OF CONSCIOUSNESS: 0
BACK PAIN: 1
JAUNDICE: 0
TACHYCARDIA: 0
PARALYSIS: 0
SORE THROAT: 0
TREMORS: 0
COUGH: 0
SHORTNESS OF BREATH: 0
DIFFICULTY URINATING: 1
HEMOPTYSIS: 0
BRUISES/BLEEDS EASILY: 0
SYNCOPE: 0
HEADACHES: 0
DECREASED APPETITE: 0
CONSTIPATION: 1
NIGHT SWEATS: 0
BOWEL INCONTINENCE: 0
SMELL DISTURBANCE: 0
NAIL CHANGES: 0
DIZZINESS: 0
HEARTBURN: 1
WEIGHT LOSS: 1
POLYPHAGIA: 0
BLOATING: 1
MYALGIAS: 1
EYE IRRITATION: 1
EXTREMITY NUMBNESS: 0
CLAUDICATION: 0
LEG SWELLING: 0
EYE REDNESS: 0
RECTAL PAIN: 0
MEMORY LOSS: 0
ARTHRALGIAS: 1
NUMBNESS: 0
LIGHT-HEADEDNESS: 0
TROUBLE SWALLOWING: 0
LEG PAIN: 0
FLANK PAIN: 0
JOINT SWELLING: 1
POLYDIPSIA: 0
SNORES LOUDLY: 0
COUGH DISTURBING SLEEP: 0
WHEEZING: 0
EXERCISE INTOLERANCE: 0
PALPITATIONS: 0
NECK MASS: 0
SPUTUM PRODUCTION: 0
DEPRESSION: 0
DECREASED CONCENTRATION: 0
SEIZURES: 0
STIFFNESS: 1
SINUS CONGESTION: 0
TINGLING: 0
WEIGHT GAIN: 0
DISTURBANCES IN COORDINATION: 0
ALTERED TEMPERATURE REGULATION: 1
DIARRHEA: 1
POOR WOUND HEALING: 0
SWOLLEN GLANDS: 0
TASTE DISTURBANCE: 0
EYE PAIN: 1
DOUBLE VISION: 1
SKIN CHANGES: 0
DYSURIA: 1
SINUS PAIN: 0
HOARSE VOICE: 0
HYPOTENSION: 0
CHILLS: 0
RESPIRATORY PAIN: 0
HYPERTENSION: 0
ORTHOPNEA: 0
PANIC: 0
MUSCLE WEAKNESS: 1
RECTAL BLEEDING: 1
FATIGUE: 0
MUSCLE CRAMPS: 1
INSOMNIA: 0
FEVER: 0
WEAKNESS: 0
HEMATURIA: 0
POSTURAL DYSPNEA: 0
HALLUCINATIONS: 0
VOMITING: 1
SPEECH CHANGE: 0
NERVOUS/ANXIOUS: 0
SLEEP DISTURBANCES DUE TO BREATHING: 0
INCREASED ENERGY: 1
DYSPNEA ON EXERTION: 0
NECK PAIN: 1
NAUSEA: 1
BLOOD IN STOOL: 1
ABDOMINAL PAIN: 1
EYE WATERING: 1

## 2017-02-09 ASSESSMENT — ANXIETY QUESTIONNAIRES
6. BECOMING EASILY ANNOYED OR IRRITABLE: SEVERAL DAYS
2. NOT BEING ABLE TO STOP OR CONTROL WORRYING: NOT AT ALL
GAD7 TOTAL SCORE: 7
5. BEING SO RESTLESS THAT IT IS HARD TO SIT STILL: MORE THAN HALF THE DAYS
1. FEELING NERVOUS, ANXIOUS, OR ON EDGE: SEVERAL DAYS
7. FEELING AFRAID AS IF SOMETHING AWFUL MIGHT HAPPEN: NOT AT ALL
IF YOU CHECKED OFF ANY PROBLEMS ON THIS QUESTIONNAIRE, HOW DIFFICULT HAVE THESE PROBLEMS MADE IT FOR YOU TO DO YOUR WORK, TAKE CARE OF THINGS AT HOME, OR GET ALONG WITH OTHER PEOPLE: NOT DIFFICULT AT ALL
3. WORRYING TOO MUCH ABOUT DIFFERENT THINGS: SEVERAL DAYS

## 2017-02-09 ASSESSMENT — PAIN SCALES - GENERAL: PAINLEVEL: SEVERE PAIN (7)

## 2017-02-09 ASSESSMENT — PATIENT HEALTH QUESTIONNAIRE - PHQ9: 5. POOR APPETITE OR OVEREATING: MORE THAN HALF THE DAYS

## 2017-02-09 NOTE — PROGRESS NOTES
Return Bariatric Surgery Note    RE: Parker Aceevdo Sr  MR#: [id number]  : 1970  VISIT DATE: 2017    Dear Dr. Daly,    I had the pleasure of seeing your patient, Parker Acevedo Sr, in my post-bariatric surgery assessment clinic.    CHIEF COMPLAINT: Post-bariatric surgery follow-up    HISTORY OF PRESENT ILLNESS:  The patient is s/p RNY GB with two subsequent revisions. 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 He has enrolled in a tobacco cessation program and is doing well from that standpoint. He is overall feeling ok. He is TPN dependent, he reports that most PO intake results in cramping pain. He uses his G tube for decompression when feeling bloated. He is scheduled to have cervical spine fusion next week for a neck injury.     Questions Regarding Prior Weight Loss Surgery Reviewed With Patient 2017   My weight loss surgery was: -   I had the following weight loss procedure: Celestino-en-y Gastric Bypass   My surgery date was: -   My weight before surgery was: -   My lowest weight since surgery was: -   How many bowel movements do you average per day? 1   Are you having problems with nausea, vomiting, diarrhea, constipation, heartburn, or abdominal pain?  If yes, please describe. same       CO-MORBIDITIES OF OBESITY INCLUDE:  I Have Reviewed The Following Co-Morbidities With The Patient 2017   I have the following co-morbidities associated with obesity: Anxiety, Back Pain   I have the following co-morbidities associated with obesity: GERD (Reflux)       Most recent weights:  Wt Readings from Last 4 Encounters:   17 174 lb   17 174 lb   17 173 lb   17 173 lb 3.2 oz       Questions Regarding Co-Morbidities and Health Concerns Reviewed With Paatient 2017   Do you currently have Diabetes? No.   Do you currently have Sleep Apnea? No   Do you currently have heartburn, acid reflux, or GERD (acid reflux disease)? Yes, I  take prescription medication to treat it.   Do you currently have Hypertension (high blood pressure)? Yes, I take multiple medications to treat it.   Do you currently have Hyperlipidemia (high cholesterol, triglycerides)? No.   Have you been to the Emergency room since your last visit with us? No   Were you in the hospital since your last visit with us? Yes   Do you have any concerns today? no       Diet, Supplement, and Medication Questions Reviewed With Patient 2/9/2017   How many meals do you eat per day? 1   Do you snack between meals? No   How much water are you drinking each day? 1 to 24 ounces (1 to 3 glasses per day)   How much food are you eating at each meal? Less than 1/2 cup   How many servings of protein are you eating per day? 0 to 2   Are you able to avoid liquid calories? Sometimes   Which of the following vitamin supplements do you take? (check all that apply) Other   Which weight loss medications are you taking now? (select all that apply) -   Are you able to tolerate regular texture foods such as chicken, steak, fish, eggs? No       Exercise Questions Reviewed With Patient 2/9/2017   How often do you exercise? 1 to 2 times per week   How much time do you spend exercising on the days you exercise? 1 to 14 minutes.   What types of exercise do you do? walking       Patient Active Problem List   Diagnosis     Peptic ulcer disease     Gastric bypass status for obesity     CARDIOVASCULAR SCREENING; LDL GOAL LESS THAN 160     Chronic abdominal pain     Ulcer (H)     Vomiting     Anemia     ADHD (attention deficit hyperactivity disorder), inattentive type     Vitamin B12 deficiency without anemia     Thiamine deficiency     Dehydration     Dysphagia     Weight loss, non-intentional     Malnutrition (H)     Chronic anxiety     Constipation     Bile reflux esophagitis     Former smoker     Vitamin D deficiency     Iron deficiency     Health Care Home     Chronic pain     Coagulase negative staph bacteremia  associated with intravascular line (H)     Insomnia     Positive blood culture     Fungemia     Chronic nausea     Gastrostomy tube in place (H)     Cervical radiculopathy        PAST MEDICAL HISTORY:  Past Medical History   Diagnosis Date     Tobacco abuse      ADHD (attention deficit hyperactivity disorder)      Gastro-oesophageal reflux disease      Hiatal hernia      Gastric ulcer, unspecified as acute or chronic, without mention of hemorrhage, perforation, or obstruction      Difficulty swallowing      Chronic abdominal pain      Short gut syndrome      Severe malnutrition (H)      TPN     Other bladder disorder      Head injury      Anxiety      Other chronic pain        PAST SURGICAL HISTORY:  Past Surgical History   Procedure Laterality Date     Hernia repair  2006     Umbilical hernia     Endoscopy  03/25/11     EGD, [facility]     Endoscopy  08/04/09     Upper Endoscopy, [facility]     Endoscopy  01/05/09     Upper Endoscopy, [facility]     Herniorrhaphy hiatal  6/22/2012     Procedure: HERNIORRHAPHY HIATAL;;  Surgeon: Blu Vyas MD;  Location:  OR     Celestino en y bowel  2003     Cholecystectomy       Gastrojejunostomy  08/26/09     Extensice enterolysis, partial resect. jejunum, part. resect gastric pouch, gastrojejunostomy anastomosis     Gastrectomy  6/22/2012     Procedure: GASTRECTOMY;  Open Approach, Excise Ulcers,Partial Gastrectomy, Esophagojejunostomy, Hiatal Hernia Repair, Extensive Lysis of Adhesions and Esaphagogastrodudenoscopy.;  Surgeon: Blu Vyas MD;  Location:  OR     Tonsillectomy       C gastric bypass,obese<100cm celestino-en-y  2002     lost 300 pounds     Esophagoscopy, gastroscopy, duodenoscopy (egd), combined  4/20/2011     Procedure:COMBINED ESOPHAGOSCOPY, GASTROSCOPY, DUODENOSCOPY (EGD); Surgeon:BLU VYAS; Location: GI     Endoscopic ultrasound upper gastrointestinal tract (gi)  4/29/2011     Procedure:ENDOSCOPIC  ULTRASOUND UPPER GASTROINTESTINAL TRACT (GI); Both Procedures done Conjointly; Surgeon:NEREIDA HOUSER; Location:[facility]     Esophagoscopy, gastroscopy, duodenoscopy (egd), combined  6/15/2011     Procedure:COMBINED ESOPHAGOSCOPY, GASTROSCOPY, DUODENOSCOPY (EGD); Surgeon:FRANCIS VYAS; Location:[facility]     Esophagoscopy, gastroscopy, duodenoscopy (egd), combined  6/12/2013     Procedure: COMBINED ESOPHAGOSCOPY, GASTROSCOPY, DUODENOSCOPY (EGD);;  Surgeon: Francis Vyas MD;  Location: [facility]     Hc esoph/gas reflux test w nasal imped electrode  8/5/2013     Procedure: ESOPHAGEAL IMPEDENCE FUNCTION TEST 1 HOUR OR LESS;  Surgeon: Halie Lang MD;  Location: [facility]     Endoscopic ultrasound upper gastrointestinal tract (gi)  9/9/2013     Procedure: ENDOSCOPIC ULTRASOUND UPPER GASTROINTESTINAL TRACT (GI);  Endoscopic Ultrasound Guide Gastrostomy Tube Placement  C-arm;  Surgeon: Noe Lizarraga MD;  Location: [facility]     Endoscopic insertion tube gastrostomy  9/9/2013     Procedure: ENDOSCOPIC INSERTION TUBE GASTROSTOMY;;  Surgeon: Francis Vyas MD;  Location: [facility]     Laparotomy exploratory  11/22/2013     Procedure: LAPAROTOMY EXPLORATORY;  Exploratory Laparotomy, Upper Endoscopy, Left Inguinal Hernia Repair;  Surgeon: Francis Vyas MD;  Location: [facility]     Herniorrhaphy inguinal  11/22/2013     Procedure: HERNIORRHAPHY INGUINAL;;  Surgeon: Francis Vyas MD;  [facility]: [facility]     Esophagoscopy, gastroscopy, duodenoscopy (egd), combined  11/22/2013     Procedure: COMBINED ESOPHAGOSCOPY, GASTROSCOPY, DUODENOSCOPY (EGD);;  Surgeon: Francis Vyas MD;  [facility]: [facility]     Esophagoscopy, gastroscopy, duodenoscopy (egd), combined  4/30/2014     Procedure: COMBINED ESOPHAGOSCOPY, GASTROSCOPY, DUODENOSCOPY (EGD);  Surgeon: Francis Vyas MD;  Location: [facility]     Appendectomy       Soft tissue surgery       Back surgery  11/3/2014     curve in the spine     Biopsy lymph  "node cervical N/A 2/20/2015     Procedure: BIOPSY LYMPH NODE CERVICAL;  Surgeon: Baron Scanlon MD;  Location: [facility]     Esophagoscopy, gastroscopy, duodenoscopy (egd), combined N/A 2/20/2015     Procedure: COMBINED ESOPHAGOSCOPY, GASTROSCOPY, DUODENOSCOPY (EGD), BIOPSY SINGLE OR MULTIPLE;  Surgeon: Baron Scanlon MD;  Location: [facility]     Soft tissue surgery       Esophagoscopy, gastroscopy, duodenoscopy (egd), combined N/A 9/30/2015     Procedure: COMBINED ESOPHAGOSCOPY, GASTROSCOPY, DUODENOSCOPY (EGD);  Surgeon: Francis Vyas MD;  Location: [facility]       Current Outpatient Prescriptions   Medication     oxyCODONE (ROXICODONE) 5 MG/5ML solution     fentaNYL (DURAGESIC) 50 mcg/hr 72 hr patch     mupirocin (BACTROBAN) 2 % ointment     morphine 0.1% in intrasite topical gel     lidocaine-prilocaine (EMLA) cream     Needle, Disp, (BD DISP NEEDLES) 27G X 1/2\" MISC     ondansetron (ZOFRAN-ODT) 8 MG ODT tab     vitamin D (ERGOCALCIFEROL) 88471 UNIT capsule     sucralfate (CARAFATE) 1 GM/10ML suspension     diazepam (VALIUM) 5 MG tablet     cyanocobalamin (VITAMIN B12) 1000 MCG/ML injection     [START ON 3/6/2017] amphetamine-dextroamphetamine (ADDERALL) 20 MG per tablet     amphetamine-dextroamphetamine (ADDERALL) 20 MG per tablet     amphetamine-dextroamphetamine (ADDERALL) 20 MG per tablet     dextrose 1,000 mL with sodium chloride 76.92 mEq solution     polyethylene glycol (MIRALAX) powder     nystatin-triamcinolone (MYCOLOG II) cream     order for DME     [facility] INFUSION MANAGED PATIENT     senna-docusate (SENOKOT-S;PERICOLACE) 8.6-50 MG per tablet     order for DME     [DISCONTINUED] NO ACTIVE MEDICATIONS     No current facility-administered medications for this visit.       Allergies   Allergen Reactions     Penicillins Anaphylaxis     Doxycycline Rash     Vancomycin Rash     Rash after receiving vancomycin 3/28/16 (red man's?). Tolerated with slower infusion and diphenhydramine premed. "       Review of Systems     Constitutional:  Positive for weight loss, increased energy and confused. Negative for fever, chills, weight gain, fatigue, decreased appetite, night sweats, recent stressors, height loss, post-operative complications, incisional pain, hallucinations and hyperactivity.   HENT:  Negative for ear pain, hearing loss, tinnitus, nosebleeds, trouble swallowing, hoarse voice, mouth sores, sore throat, ear discharge, tooth pain, gum tenderness, taste disturbance, smell disturbance, hearing aid, bleeding gums, dry mouth, sinus pain, sinus congestion and neck mass.    Eyes:  Positive for double vision, pain, eye pain, decreased vision, eye watering, eye dryness, flashing lights, spots, floaters and eye irritation. Negative for redness, eye bulging, strabismus, tunnel vision and jaundice.   Respiratory:   Negative for cough, hemoptysis, sputum production, shortness of breath, wheezing, sleep disturbances due to breathing, snores loudly, respiratory pain, dyspnea on exertion, cough disturbing sleep and postural dyspnea.    Cardiovascular:  Negative for chest pain, dyspnea on exertion, palpitations, orthopnea, claudication, leg swelling, fingers/toes turn blue, hypertension, hypotension, syncope, history of heart murmur, chest pain on exertion, chest pain at rest, pacemaker, few scattered varicosities, leg pain, sleep disturbances due to breathing, tachycardia, light-headedness, exercise intolerance and edema.   Gastrointestinal:  Positive for heartburn, nausea, vomiting, abdominal pain, diarrhea, constipation, blood in stool, bloating and rectal bleeding. Negative for melena, rectal pain, hemorrhoids, bowel incontinence, jaundice, coffee ground emesis and change in stool.   Genitourinary:  Positive for bladder incontinence, dysuria, urgency, difficulty urinating and voiding less frequently. Negative for hematuria, flank pain, nocturia, scrotal pain, ulcerations, penile discharge, male genitourinary  "complaint and reduced libido.   Musculoskeletal:  Positive for myalgias, back pain, joint swelling, arthralgias, stiffness, muscle cramps, neck pain, bone pain, muscle weakness and fracture.   Skin:  Positive for itching, scarring, flaky skin, Raynaud's phenomenon and sensitivity to sunlight. Negative for nail changes, poor wound healing, rash, hair changes, skin changes, acne, warts, poor wound healing and skin thickening.   Neurological:  Negative for dizziness, tingling, tremors, speech change, seizures, loss of consciousness, weakness, light-headedness, numbness, headaches, disturbances in coordination, extremity numbness, memory loss, difficulty walking and paralysis.   Endo/Heme:  Negative for anemia, swollen glands and bruises/bleeds easily.   Psychiatric/Behavioral:  Negative for depression, hallucinations, memory loss, decreased concentration, mood swings and panic attacks.    Endocrine:  Positive for altered temperature regulation.Negative for polyphagia, polydipsia and change in facial hair.      PHYSICAL EXAMINATION:  /68 mmHg  Pulse 88  Temp(Src) 98.2  F (36.8  C) (Temporal)  Ht 5' 11\"  Wt 174 lb  BMI 24.28 kg/m2   Body mass index is 24.28 kg/(m^2).   NAD NCAT  Respiratory: breathing unlabored  Abdomen: s/nt/nd; inc c/d/i;     ASSESSMENT AND PLAN:  46 year old male 14  year(s) status  post open gastric bypass.      Procedure Note:  I removed patients prior 4.0 cm 20 Fr COLIN G-tube. I replaced thsi with a 3.0 cm 18 Fr G tube, it fit well and spun easily. The balloon was inflated with 4cc of water.     1. Continue TPN for nutritional support. Diet as tolerated  2. F/u routine bariatric diet guidelines.  See guidelines below.  3. Follow-up: 1 year  4. Ursodial is not needed.  5. See dietitian today: No.    I emphasized exercise and activity behavior along with appropriate food choice as the main foundation for weight loss.  Surgery is to provide surgical reinforcement of the appropriate " behavior set.    If you have any questions about our plans, please don't hesitate to contact me.     Sincerely,    Margie Arguelles MD    Physician Attestation  I, Francis Vyas, saw and evaluated this patient as part of a shared visit.  I have reviewed and discussed with the advanced practice provider and/or resident their history, physical and plan.    I personally reviewed the vital signs, medications, labs and imaging.    My key history or physical exam findings: stable.  Weight is fine.  G-tube replaced.    Key management decisions made by me: as noted.    Francis Vyas MD  Date of Service (when I saw the patient): 2/9/2017      GUIDELINES FOR BARIATRIC SURGERY PATIENTS    FOLLOW UP:    1. Follow-up intervals: during the first year: 1 week, 1 month, 3, 6, 12 months; then every 6 months until year 5; annually thereafter.  The goal of follow-up visits is to ensure that food intake behavior (choice of food and eating speed) and exercise/activity level are set appropriately.  Patients who have had a adjustable gastric band may be seen more frequently the first year for band adjustments and will be asked to get an annual UGI xray with 6 sec cine to evaluate band position and function.  2. The following labs are ordered by our clinic at 3 months after surgery and annually: lipids, comprehensive metabolic panel, hemoglobin, ferritin, parathormone, prealbumin, A1C, copper, zinc and vitamins A, B1, B12, and D.  3. Regular exercise and activity as tolerated is key to long term success.    COMPLICATIONS:    1. The bariatric team should be aware and potentially evaluate all adverse gastrointestinal symptoms (dysphagia, abdominal pain, nausea, vomiting, diarrhea, heartburn, reflux, etc), which can be a sign of complication.  The bariatric surgeons perform general surgery procedures in addition to bariatric surgery (laparoscopic cholecystectomy, etc.).  2. Inability to tolerate textured food (chicken, steak,  fish, eggs, beans) is NOT normal and may need to be evaluated by endoscopy performed by the bariatric surgeon.  3. Our Call Center line is [phone] for appointments, triage nurse and clinic nurse access. Ask to be connected with the clinic nurse if complications/concerns need to addressed.    SUPPLEMENTS:    1.If patient had an adjustable gastric band or vertical banded gastroplasty:    a. Multivitamin/minerals with iron, once a day, orally.  b. Calcium Citrate 500mg with vitamin D 200mg, three times a day, orally.    2. If patient had a sleeve gastrectomy:    a. Multivitamin/minerals with iron, once a day, orally.  b. Calcium Citrate 500mg with vitamin D, three times a day, orally.  c. Vitamin B12 500mcg per day SL or 1000mcg/ml.  If vitamin B12 is stable, can switch to a B-complex vitamin at 1 year.    3. If patient had a Celestino-en-Y gastric bypass:    a. Multivitamin/minerals with iron, twice a day, orally.  b. Calcium Citrate 500mg with vitamin D three times a day, orally.  c. Vitamin B12 500mcg per day SL or 1000mcg/ml,  If vitamin B12 is stable, can switch to a B-complex vitamin at 1 year.  4. If patient had a duodenal switch or a Celestino-en-Y gastric bypass with a long limb:    a. Multivitamin/minerals with iron, twice a day, orally.  b. Calcium Citrate 600mg with vitamin D four times a day, orally.  c. Vitamin B12 500mcg per day SL or 1000mcg/ml.   d. ADEK / Source CF or Aquadek three times a day with meals.  Each ADEK contains the following fat soluable vitamins: vitamin A - 5667 IU; vitamin D - 400 IU; vitamin E - 150 IU; and vitamin K - 150 mcg.    BARIATRIC DIET   1. Day 1: Clear liquids  2. Day 2-14: Full liquids  3. Day 14-27: Pureed foods  4. Day 28-55: Soft foods  5. Day 56-84: Regular foods  6. Regular foods:  -eat 1/2 cup over 30 minutes.  -avoid drinking during meals and for 30 minutes before and after meals.  -focus on eating until hunger goes away rather than eating all the way to a full  feeling.  -eat protein sources of food first (steak, chicken, fish, beans, eggs), followed by leafy vegetables (spinach, kale, amarilis, mixed greens, etc.) or nonstarch vegetables second, then solid fruits.  -only eat foods that require a fork, those foods are more apt to provide hunger suppression by staying in the pouch. Using this rule helps avoid choosing the wrong types of food for pouch operations. The wrong foods include liquid calories, crumbly foods, and starch based foods.  -avoid liquid calories: soup, juice, soda, slimfast, ice cream, and alcohol.  -avoid crumbly foods: chips, crackers, and cookies.  -avoid starch based foods: pasta, too much rice, and too much bread.

## 2017-02-09 NOTE — PROGRESS NOTES
[facility] [place]  [address], [address]  [place] [address]  [phone]  Dept: [phone]    PRE-OP EVALUATION:  Today's date: 2017    Parker Acevedo  (: 1970) presents for pre-operative evaluation assessment as requested by Dr. Campos.  He requires evaluation and anesthesia risk assessment prior to undergoing surgery/procedure for treatment of cervical spinal stenosis.  Proposed procedure: DISCECTOMY, FUSION CERVICAL ANTERIOR ONE LEVEL CERVICAL 5-6    Date of Surgery/ Procedure: 2/15/2017  Time of Surgery/ Procedure: 12:00pm  Hospital/Surgical Facility: [facility]  Fax number for surgical facility:   Primary Physician: Esteban Daly  Type of Anesthesia Anticipated: General    Patient has a Health Care Directive or Living Will:  YES -on file     Preop Questions 2/3/2017   1.  Do you have a history of heart attack, stroke, stent, bypass or surgery on an artery in the head, neck, heart or legs? No   2.  Do you ever have any pain or discomfort in your chest? No   3.  Do you have a history of  Heart Failure? No   4.   Are you troubled by shortness of breath when:  walking on a level surface, or up a slight hill, or at night? No   5.  Do you currently have a cold, bronchitis or other respiratory infection? No   6.  Do you have a cough, shortness of breath, or wheezing? No   7.  Do you sometimes get pains in the calves of your legs when you walk? YES - intermittent LE swelling with activity   8. Do you or anyone in your family have previous history of blood clots? No   9.  Do you or does anyone in your family have a serious bleeding problem such as prolonged bleeding following surgeries or cuts? No   10. Have you ever had problems with anemia or been told to take iron pills? No   11. Have you had any abnormal blood loss such as black, tarry or bloody stools? No   12. Have you ever had a blood transfusion? No   13. Have you or any of your relatives ever had  problems with anesthesia? No   14. Do you have sleep apnea, excessive snoring or daytime drowsiness? Yes, daytime drowsiness   15. Do you have any prosthetic heart valves? No   16. Do you have prosthetic joints? No           HPI:                                                      Brief HPI related to upcoming procedure: cervical spinal stenosis and neck pain requiring surgery. Had a fall approximately 4-5 weeks ago with injury to his neck.       See problem list for Extensive active medical problems.  Problems all longstanding and stable, except as noted/documented.  See ROS for pertinent symptoms related to these conditions.                                                                                                  .    MEDICAL HISTORY:                                                      Patient Active Problem List    Diagnosis Date Noted     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)      Priority: Medium     mild EF ~45% on rest 2/13/17, improves with stressing       Severe malnutrition (H)      Priority: Medium     TPN       Short gut syndrome      Priority: Medium     Anxiety      Priority: Medium     ADHD (attention deficit hyperactivity disorder)      Priority: Medium     Cervical radiculopathy 01/17/2017     Priority: Medium     Gastrostomy tube in place (H) 08/09/2016     Priority: Medium     Chronic nausea 05/10/2016     Priority: Medium     Fungemia 04/11/2016     Priority: Medium     Positive blood culture 03/29/2016     Priority: Medium     Insomnia 08/13/2015     Priority: Medium     Coagulase negative staph bacteremia associated with intravascular line (H) 07/30/2015     Priority: Medium     Chronic pain 07/07/2015     Priority: Medium     Patient is followed by Dr. Milligan for ongoing prescription of pain medication.  All refills should be approved by this provider, or covering partner.    Medication(s): Fentanyl 50 mcg patches every three days/ Liquid oxycodone 5 mg/5ml up to 50 mg daily.    Maximum quantity per month: as per Dr. Milligan through [facility]  Clinic visit frequency required: Q 3 months at Pain Management    Controlled substance agreement on file: Yes       Date(s): Through Pain Management    Pain Clinic evaluation in the past: Yes       Date(s):  Ongoing every three months       Location(s):  Per pain management    DIRE Total Score(s):  No flowsheet data found.    Last [facility] website verification:  Through Pain Management   [email or web address]    Esteban Daly MD             Health Care Home 02/24/2015     Priority: Medium     Status:  Accepted  Care Coordinator:  Melissa Behl BSN, RN, PHN  [facility] Care Coordinator  [phone]     See Letters for [facility] Care Plan  Date:  April 4, 2016            Iron deficiency 05/23/2014     Priority: Medium     Vitamin D deficiency 05/22/2014     Priority: Medium     Former smoker 02/24/2014     Priority: Medium     Bile reflux esophagitis 10/16/2013     Priority: Medium     Constipation 10/01/2013     Priority: Medium     Chronic anxiety 09/25/2013     Priority: Medium     Dysphagia 09/17/2013     Priority: Medium     Weight loss, non-intentional 09/17/2013     Priority: Medium     Malnutrition (H) 09/17/2013     Priority: Medium     Dehydration 08/28/2013     Priority: Medium     Vitamin B12 deficiency without anemia 07/31/2013     Priority: Medium     Diagnosis updated by automated process. Provider to review and confirm.       Thiamine deficiency 07/31/2013     Priority: Medium     ADHD (attention deficit hyperactivity disorder), inattentive type 07/02/2013     Priority: Medium     Anemia 09/20/2012     Priority: Medium     Vomiting 06/28/2012     Priority: Medium     Ulcer (H) 06/22/2012     Priority: Medium     Chronic abdominal pain 06/01/2012     Priority: Medium     Patient is followed by ALEXANDRIA WHITLEY for ongoing prescription of narcotic pain medicine.  Med: liquid oxycodone up to 60 mg per day.   Maximum use  per month:   Expected duration: ongoing, hope per surgery that will resolve with upcoming surgery  Narcotic agreement on file: YES  Clinic visit recommended: Q 3 months         CARDIOVASCULAR SCREENING; LDL GOAL LESS THAN 160 10/31/2010     Priority: Medium     Peptic ulcer disease 04/08/2010     Priority: Medium     Gastric bypass status for obesity 04/08/2010     Priority: Medium     Top weight of 492.        Past Medical History   Diagnosis Date     ADHD (attention deficit hyperactivity disorder)      Anxiety      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)      mild EF ~45% on rest 2/13/17, improves with stressing     Chronic abdominal pain      Difficulty swallowing      Gastric ulcer, unspecified as acute or chronic, without mention of hemorrhage, perforation, or obstruction      Gastro-oesophageal reflux disease      Head injury      Hiatal hernia      Other bladder disorder      Other chronic pain      Severe malnutrition (H)      TPN     Short gut syndrome      Tobacco abuse      Past Surgical History   Procedure Laterality Date     Hernia repair  2006     Umbilical hernia     Endoscopy  03/25/11     EGD, [place] [facility]     Endoscopy  08/04/09     Upper Endoscopy, [place] [facility]     Endoscopy  01/05/09     [facility] Endoscopy, [place] [facility]     Herniorrhaphy hiatal  6/22/2012     Procedure: HERNIORRHAPHY HIATAL;;  Surgeon: Francis Vyas MD;  Location: [facility] OR     Celestino en y bowel  2003     Cholecystectomy       Gastrojejunostomy  08/26/09     Extensice enterolysis, partial resect. jejunum, part. resect gastric pouch, gastrojejunostomy anastomosis     Gastrectomy  6/22/2012     Procedure: GASTRECTOMY;  Open Approach, Excise Ulcers,Partial Gastrectomy, Esophagojejunostomy, Hiatal Hernia Repair, Extensive Lysis of Adhesions and Esaphagogastrodudenoscopy.;  Surgeon: Francis Vyas MD;  Location: [facility] OR     Tonsillectomy       C gastric bypass,obese<100cm celestino-en-y  2002     lost 300 pounds      Esophagoscopy, gastroscopy, duodenoscopy (egd), combined  4/20/2011     Procedure:COMBINED ESOPHAGOSCOPY, GASTROSCOPY, DUODENOSCOPY (EGD); Surgeon:FRANCIS VYAS; Location:[facility]     Endoscopic ultrasound upper gastrointestinal tract (gi)  4/29/2011     Procedure:ENDOSCOPIC ULTRASOUND UPPER GASTROINTESTINAL TRACT (GI); Both Procedures done Conjointly; Surgeon:NEREIDA HOUSER; [facility]     Esophagoscopy, gastroscopy, duodenoscopy (egd), combined  6/15/2011     Procedure:COMBINED ESOPHAGOSCOPY, GASTROSCOPY, DUODENOSCOPY (EGD); Surgeon:FRANCIS VYAS; [facility]     Esophagoscopy, gastroscopy, duodenoscopy (egd), combined  6/12/2013     Procedure: COMBINED ESOPHAGOSCOPY, GASTROSCOPY, DUODENOSCOPY (EGD);;  Surgeon: Francis Vyas MD;  [facility]: [facility]     Hc esoph/gas reflux test w nasal imped electrode  8/5/2013     Procedure: ESOPHAGEAL IMPEDENCE FUNCTION TEST 1 HOUR OR LESS;  Surgeon: Halie Lang MD;  [facility]: [facility]     Endoscopic ultrasound upper gastrointestinal tract (gi)  9/9/2013     Procedure: ENDOSCOPIC ULTRASOUND UPPER GASTROINTESTINAL TRACT (GI);  Endoscopic Ultrasound Guide Gastrostomy Tube Placement  C-arm;  Surgeon: Noe Lizarraga MD;  [facility]: [facility]     Endoscopic insertion tube gastrostomy  9/9/2013     Procedure: ENDOSCOPIC INSERTION TUBE GASTROSTOMY;;  Surgeon: Francis Vyas MD;  [facility]     Laparotomy exploratory  11/22/2013     Procedure: LAPAROTOMY EXPLORATORY;  Exploratory Laparotomy, Upper Endoscopy, Left Inguinal Hernia Repair;  Surgeon: Francis Vyas MD;  [facility]     Herniorrhaphy inguinal  11/22/2013     Procedure: HERNIORRHAPHY INGUINAL;;  Surgeon: Francis Vyas MD;  [facility]     Esophagoscopy, gastroscopy, duodenoscopy (egd), combined  11/22/2013     Procedure: COMBINED ESOPHAGOSCOPY, GASTROSCOPY, DUODENOSCOPY (EGD);;  Surgeon: Francis Vyas MD;  [facility]     Esophagoscopy,  gastroscopy, duodenoscopy (egd), combined  4/30/2014     Procedure: COMBINED ESOPHAGOSCOPY, GASTROSCOPY, DUODENOSCOPY (EGD);  Surgeon: Francis Vyas MD;  [facility]     Appendectomy       Soft tissue surgery       Back surgery  11/3/2014     curve in the spine     Biopsy lymph node cervical N/A 2/20/2015     Procedure: BIOPSY LYMPH NODE CERVICAL;  Surgeon: Baron Scanlon MD;  Location: [facility]     Esophagoscopy, gastroscopy, duodenoscopy (egd), combined N/A 2/20/2015     Procedure: COMBINED ESOPHAGOSCOPY, GASTROSCOPY, DUODENOSCOPY (EGD), BIOPSY SINGLE OR MULTIPLE;  Surgeon: Baron Scanlon MD;  Location[facility]     Soft tissue surgery       Esophagoscopy, gastroscopy, duodenoscopy (egd), combined N/A 9/30/2015     Procedure: COMBINED ESOPHAGOSCOPY, GASTROSCOPY, DUODENOSCOPY (EGD);  Surgeon: Francis Vyas MD;  Location:  GI     Current Outpatient Prescriptions   Medication Sig Dispense Refill     oxyCODONE (ROXICODONE) 5 MG/5ML solution Take 10-15 mLs (10-15 mg) by mouth every 4 hours as needed for moderate to severe pain Max of 45mg per day. Fill on/after 2/8/17 not to start till 2/10/17. 1350 mL 0     fentaNYL (DURAGESIC) 50 mcg/hr 72 hr patch Place 1 patch onto the skin every 48 hours MYLAN BRAND ONLY. Fill on/after 2/23/17 to start on/after 2/25/17 15 patch 0     mupirocin (BACTROBAN) 2 % ointment Apply topically 3 times daily 30 g 0     morphine 0.1% in intrasite topical gel Apply as needed prior to accessing the port site. 100 g 0     lidocaine-prilocaine (EMLA) cream Apply topically as needed for moderate pain 30 g 0     ondansetron (ZOFRAN-ODT) 8 MG ODT tab Take 1 tablet (8 mg) by mouth every 8 hours as needed for nausea 90 tablet 3     vitamin D (ERGOCALCIFEROL) 94677 UNIT capsule Take 1 capsule (50,000 Units) by mouth every 7 days 12 capsule 3     sucralfate (CARAFATE) 1 GM/10ML suspension Take 10 mLs (1 g) by mouth 4 times daily 1200 mL 11     diazepam (VALIUM) 5 MG tablet 5 mg  "each morning and 5 mg at bedtime 60 tablet 2     cyanocobalamin (VITAMIN B12) 1000 MCG/ML injection Inject 1 mL (1,000 mcg) into the muscle every 30 days 1 mL 11     [START ON 3/6/2017] amphetamine-dextroamphetamine (ADDERALL) 20 MG per tablet Take 1 tablet (20 mg) by mouth 2 times daily 60 tablet 0     dextrose 1,000 mL with sodium chloride 76.92 mEq solution Inject 110 mL/hr into the vein continuous 2640 mL 10     nystatin-triamcinolone (MYCOLOG II) cream Apply topically 2 times daily as needed 60 g 5     Needle, Disp, (BD DISP NEEDLES) 27G X 1/2\" MISC 1 Device every 30 days Use for cyanocobalamin injection once q 30 days. 3 each 4     amphetamine-dextroamphetamine (ADDERALL) 20 MG per tablet Take 1 tablet (20 mg) by mouth 2 times daily 60 tablet 0     amphetamine-dextroamphetamine (ADDERALL) 20 MG per tablet Take 1 tablet (20 mg) by mouth 2 times daily 60 tablet 0     polyethylene glycol (MIRALAX) powder Take 17 g (1 capful) by mouth daily as needed 510 g 11     order for DME Equipment being ordered: Bilateral knee high chronic venous insufficiency stockings--  mild-moderate pressures. 4 each 5     [place] [facility]NAGED PATIENT Contact [facility] for patient specific medication information at [phone] on admission and discharge from the hospital.  Phones are answered 24 hours a day 7 days a week 365 days a year.    Providers - Choose \"CONTINUE HOME MED (no script)\" at discharge if patient treatment with home infusion will continue.       senna-docusate (SENOKOT-S;PERICOLACE) 8.6-50 MG per tablet Take 1-2 tablets by mouth 2 times daily as needed for constipation 120 tablet 11     order for DME Injection Supplies for Vitamin B12: 3cc syringes w/ 27 gauge needles, 1/2 inch length 12 each 0     [DISCONTINUED] NO ACTIVE MEDICATIONS . 0 0     OTC products: None, except as noted above    Allergies   Allergen Reactions     Penicillins Anaphylaxis     Doxycycline Rash     Vancomycin Rash     " "Rash after receiving vancomycin 3/28/16 (red man's?). Tolerated with slower infusion and diphenhydramine premed.      Latex Allergy: NO    Social History   Substance Use Topics     Smoking status: Light Tobacco Smoker     Packs/day: 0.10     Years: 3.00     Types: Cigarettes     Smokeless tobacco: Current User      Comment: I use an e cig every now and than     Alcohol use No      Comment: quit      History   Drug Use No       REVIEW OF SYSTEMS:                                                    Constitutional, neuro, ENT, endocrine, pulmonary, cardiac, gastrointestinal, genitourinary, musculoskeletal, integument and psychiatric systems are negative, except as otherwise noted.  Pt. Has chronic malnutrition. Has TPN for 12 hours during sleep. Has PEG tube for gastric bile expulsion with intermittent bloating. Able to take limited PO- these are all baseline for patient.     EXAM:                                                    /68  Pulse 88  Temp 98.2  F (36.8  C) (Temporal)  Resp 16  Ht 5' 11\" (1.803 m)  Wt 174 lb (78.9 kg)  BMI 24.27 kg/m2    GENERAL APPEARANCE: healthy, alert and no distress   Central line access- right upper ant. Chest.      EYES: EOMI, - PERRL     HENT: ear canals and TM's normal and nose and mouth without ulcers or lesions     NECK: no adenopathy and no adenopathy, surgical scars posteriorly     RESP: lungs clear to auscultation - no rales, rhonchi or wheezes     CV: regular rates and rhythm, normal S1 S2, no S3 or S4 and no murmur, click or rub, no edema noted today     ABDOMEN: s/p bypass and surgical scars, PEG tube, diffusely tender generalized, normal BS, no obvious masses     MS: vertebral tenderness in neck and upper back- with even light touch, extremities normal- no gross deformities noted, no evidence of inflammation in joints, FROM in all extremities.     SKIN: no suspicious lesions or rashes     NEURO: Normal strength and tone, sensory exam grossly normal, mentation intact " and speech normal     PSYCH: mentation appears normal. and affect normal/bright     LYMPHATICS: No axillary, cervical, inguinal, or supraclavicular nodes    DIAGNOSTICS:                                                    Hemoglobin (indicated for history of anemia or procedure with significant blood loss such as tonsillectomy, major intraperitoneal surgery, vascular surgery, major spine surgery, total joint replacement)  Serum Potassium  Serum Creatinine  Ordering stress echo    Recent Labs   Lab Test  01/13/17   1400  12/13/16   1500   10/26/16   0713  10/25/16   1804   07/23/13   1036   04/09/13   0921   HGB  12.3*  13.1*   < >  11.4*   --    < >  10.5*   < >  10.6*   PLT  146*  174   < >  76*   --    < >   --    < >   --    INR   --    --    --   1.24*  1.20*   < >   --    --    --    NA  143  146*   < >  144   --    < >  143   < >   --    POTASSIUM  4.0  3.5   < >  3.1*   --    < >  4.0   < >   --    CR  0.76  0.80   < >  0.64*   --    < >  0.72   < >   --    A1C   --    --    --    --    --    --   4.9   --   5.3    < > = values in this interval not displayed.        IMPRESSION:                                                    Reason for surgery/procedure: cervical stenosis  Diagnosis/reason for consult: pre-op clearance,   S/p gastric bypass with malnutrition and chronic GI dysfunction, physical de-conditioning,  chronic pain    The proposed surgical procedure is considered INTERMEDIATE risk.    REVISED CARDIAC RISK INDEX  The patient has the following serious cardiovascular risks for perioperative complications such as (MI, PE, VFib and 3  AV Block):  Mild cardiomyopathy on stress echo- 2/13/17, EF 45-50%, mild hypokinesis  INTERPRETATION: 1 risks: Class [place]    The patient has the following additional risks for perioperative complications:  High tolerance to opioid analgesics due to chronic pain  Chronic malnutrition      ICD-10-CM    1. Preop general physical exam Z01.818 CBC with platelets and differential      Comprehensive metabolic panel     Triglycerides     Bilirubin direct     Magnesium     Phosphorus     CANCELED: CBC with platelets and differential     CANCELED: Comprehensive metabolic panel     CANCELED: Triglycerides     CANCELED: Bilirubin direct     CANCELED: Magnesium     CANCELED: Phosphorus     CANCELED: EKG 12-lead complete w/read - Clinics   2. Cervical radiculopathy M54.12 CBC with platelets and differential     Comprehensive metabolic panel     CANCELED: CBC with platelets and differential     CANCELED: Comprehensive metabolic panel     CANCELED: EKG 12-lead complete w/read - Clinics   3. High risk medication use Z79.899 CBC with platelets and differential     Comprehensive metabolic panel     Triglycerides     Bilirubin direct     Magnesium     Phosphorus     CANCELED: CBC with platelets and differential     CANCELED: Comprehensive metabolic panel     CANCELED: Triglycerides     CANCELED: Bilirubin direct     CANCELED: Magnesium     CANCELED: Phosphorus     CANCELED: EKG 12-lead complete w/read - Clinics   4. Chronic abdominal pain R10.9 CBC with platelets and differential    G89.29 Comprehensive metabolic panel     Triglycerides     Bilirubin direct     Magnesium     Phosphorus     CANCELED: CBC with platelets and differential     CANCELED: Comprehensive metabolic panel     CANCELED: Triglycerides     CANCELED: Bilirubin direct     CANCELED: Magnesium     CANCELED: Phosphorus   5. Ulcer (H) L98.499    6. Gastric bypass status for obesity Z98.84    7. Malnutrition (H) E46 CBC with platelets and differential     Comprehensive metabolic panel     Triglycerides     Bilirubin direct     Magnesium     Phosphorus     CANCELED: CBC with platelets and differential     CANCELED: Comprehensive metabolic panel     CANCELED: Triglycerides     CANCELED: Bilirubin direct     CANCELED: Magnesium     CANCELED: Phosphorus   8. Physical deconditioning R53.81 ECHO STRESS DOBUTAMINE WITH OPTISON     CANCELED: Echo   Stress Test With Def   9. 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) E63.9     I43    10. Severe malnutrition (H) E43        RECOMMENDATIONS:                                                      --Consult hospital rounder / IM to assist post-op medical management    Cardiovascular Risk  Pre-op stress testing due to low functional output, chronically and intermediate risk surgery.   Has FRANCISCO and unable to perform over 2 METS of activity for any lengthy period.  Stress echo shows mild cardiomyopathy, EF 45-50%, mild hypokenisis    Suggest DVT prophylaxis post-op.       --Patient is to take all scheduled medications on the day of surgery EXCEPT for modifications listed below.    May proceed without further diagnostics. Labs on file.       Signed Electronically by: SAILAJA Lam CNP    Copy of this evaluation report is provided to requesting physician.    [facility] Preop Guidelines

## 2017-02-09 NOTE — NURSING NOTE
"(   Chief Complaint   Patient presents with     RECHECK     Last saw DR. Vyas 7/25/16 for G tube exchange in clinic.    )    ( Weight: 174 lb )  ( Height: 5' 11\" )  ( BMI (Calculated): 24.32 )  ( Seminar Weight: 495 lb )  ( Seminar Wt Minus Current Wt (lbs): 321 )  ( Last Visits Weight: 188 lb 3.2 oz )  ( Change from Last Visit Weight (lbs): -14.2 )  (   )  (   )    ( BP: 112/68 mmHg )  (   )  ( Temp: 98.2  F (36.8  C) )  ( Temp src: Temporal )  ( Pulse: 88 )  (   )  (   )    (   Patient Active Problem List   Diagnosis     Peptic ulcer disease     Gastric bypass status for obesity     CARDIOVASCULAR SCREENING; LDL GOAL LESS THAN 160     Chronic abdominal pain     Ulcer (H)     Vomiting     Anemia     ADHD (attention deficit hyperactivity disorder), inattentive type     Vitamin B12 deficiency without anemia     Thiamine deficiency     Dehydration     Dysphagia     Weight loss, non-intentional     Malnutrition (H)     Chronic anxiety     Constipation     Bile reflux esophagitis     Former smoker     Vitamin D deficiency     Iron deficiency     Health Care Home     Chronic pain     Coagulase negative staph bacteremia associated with intravascular line (H)     Insomnia     Positive blood culture     Fungemia     Chronic nausea     Gastrostomy tube in place (H)     Cervical radiculopathy    )  (   Current Outpatient Prescriptions   Medication Sig Dispense Refill     oxyCODONE (ROXICODONE) 5 MG/5ML solution Take 10-15 mLs (10-15 mg) by mouth every 4 hours as needed for moderate to severe pain Max of 45mg per day. Fill on/after 2/8/17 not to start till 2/10/17. 1350 mL 0     fentaNYL (DURAGESIC) 50 mcg/hr 72 hr patch Place 1 patch onto the skin every 48 hours MYLAN BRAND ONLY. Fill on/after 2/23/17 to start on/after 2/25/17 15 patch 0     mupirocin (BACTROBAN) 2 % ointment Apply topically 3 times daily 30 g 0     morphine 0.1% in intrasite topical gel Apply as needed prior to accessing the port site. 100 g 0     " "lidocaine-prilocaine (EMLA) cream Apply topically as needed for moderate pain 30 g 0     Needle, Disp, (BD DISP NEEDLES) 27G X 1/2\" MISC 1 Device every 30 days Use for cyanocobalamin injection once q 30 days. 3 each 4     ondansetron (ZOFRAN-ODT) 8 MG ODT tab Take 1 tablet (8 mg) by mouth every 8 hours as needed for nausea 90 tablet 3     vitamin D (ERGOCALCIFEROL) 11111 UNIT capsule Take 1 capsule (50,000 Units) by mouth every 7 days 12 capsule 3     sucralfate (CARAFATE) 1 GM/10ML suspension Take 10 mLs (1 g) by mouth 4 times daily 1200 mL 11     diazepam (VALIUM) 5 MG tablet 5 mg each morning and 5 mg at bedtime 60 tablet 2     cyanocobalamin (VITAMIN B12) 1000 MCG/ML injection Inject 1 mL (1,000 mcg) into the muscle every 30 days 1 mL 11     [START ON 3/6/2017] amphetamine-dextroamphetamine (ADDERALL) 20 MG per tablet Take 1 tablet (20 mg) by mouth 2 times daily 60 tablet 0     amphetamine-dextroamphetamine (ADDERALL) 20 MG per tablet Take 1 tablet (20 mg) by mouth 2 times daily 60 tablet 0     amphetamine-dextroamphetamine (ADDERALL) 20 MG per tablet Take 1 tablet (20 mg) by mouth 2 times daily 60 tablet 0     dextrose 1,000 mL with sodium chloride 76.92 mEq solution Inject 110 mL/hr into the vein continuous 2640 mL 10     polyethylene glycol (MIRALAX) powder Take 17 g (1 capful) by mouth daily as needed 510 g 11     nystatin-triamcinolone (MYCOLOG II) cream Apply topically 2 times daily as needed 60 g 5     order for DME Equipment being ordered: Bilateral knee high chronic venous insufficiency stockings--  mild-moderate pressures. 4 each 5     [place] [facility]NA[facility] PATIENT Contact [facility] for patient specific medication information at [phone] on admission and discharge from the hospital.  Phones are answered 24 hours a day 7 days a week 365 days a year.    Providers - Choose \"CONTINUE HOME MED (no script)\" at discharge if patient treatment with home infusion will continue.       " senna-docusate (SENOKOT-S;PERICOLACE) 8.6-50 MG per tablet Take 1-2 tablets by mouth 2 times daily as needed for constipation 120 tablet 11     order for DME Injection Supplies for Vitamin B12: 3cc syringes w/ 27 gauge needles, 1/2 inch length 12 each 0     [DISCONTINUED] NO ACTIVE MEDICATIONS . 0 0    )  ( Diabetes Eval:    )    ( Pain Eval:  Data Unavailable )    ( Wound Eval:       )    (   History   Smoking status     Light Tobacco Smoker -- 0.10 packs/day for 3 years     Types: Cigarettes   Smokeless tobacco     Current User     Comment: I use an e cig every now and than    )    Patient medication list, drug allergies and history was reviewed today at another [facility]] today.  ( Signed By:  Anupam Grover; February 9, 2017; 12:32 PM )

## 2017-02-09 NOTE — TELEPHONE ENCOUNTER
Called patient's wife to clarify.      We faxed the script to the pharmacy on 1/24/2017, but it needs to be hand delivered to the pharmacy.    Located the script and placed at  for patient/wife to .

## 2017-02-09 NOTE — NURSING NOTE
"Chief Complaint   Patient presents with     Pre-Op Exam     DOS: 2/15/2017     Panel Management     PHQ-9/CIERRA       Initial /68 mmHg  Pulse 88  Temp(Src) 98.2  F (36.8  C) (Temporal)  Resp 16  Ht 5' 11\" (1.803 m)  Wt 174 lb (78.926 kg)  BMI 24.28 kg/m2 Estimated body mass index is 24.28 kg/(m^2) as calculated from the following:    Height as of this encounter: 5' 11\" (1.803 m).    Weight as of this encounter: 174 lb (78.926 kg).  Medication Reconciliation: complete  Louann Bojorquez CMA (AAMA)    "

## 2017-02-09 NOTE — LETTER
2017       RE: Parker Acevedo Sr  [address] [facility] [address]     Dear Colleague,    Thank you for referring your patient, Parker Acevedo Sr, to the [facility] SURGICAL WEIGHT MANAGEMENT at [facility]. Please see a copy of my visit note below.        Return Bariatric Surgery Note    RE: Parker Acevedo Sr  MR#: [id number]  : 1970  VISIT DATE: 2017    Dear Dr. Daly,    I had the pleasure of seeing your patient, Parker Acevedo Sr, in my post-bariatric surgery assessment clinic.    CHIEF COMPLAINT: Post-bariatric surgery follow-up    HISTORY OF PRESENT ILLNESS:  The patient is s/p RNY GB with two subsequent revisions. 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 He has enrolled in a tobacco cessation program and is doing well from that standpoint. He is overall feeling ok. He is TPN dependent, he reports that most PO intake results in cramping pain. He uses his G tube for decompression when feeling bloated. He is scheduled to have cervical spine fusion next week for a neck injury.     Questions Regarding Prior Weight Loss Surgery Reviewed With Patient 2017   My weight loss surgery was: -   I had the following weight loss procedure: Celestino-en-y Gastric Bypass   My surgery date was: -   My weight before surgery was: -   My lowest weight since surgery was: -   How many bowel movements do you average per day? 1   Are you having problems with nausea, vomiting, diarrhea, constipation, heartburn, or abdominal pain?  If yes, please describe. same       CO-MORBIDITIES OF OBESITY INCLUDE:  I Have Reviewed The Following Co-Morbidities With The Patient 2017   I have the following co-morbidities associated with obesity: Anxiety, Back Pain   I have the following co-morbidities associated with obesity: GERD (Reflux)       Most recent weights:  Wt Readings from Last 4 Encounters:   17 174 lb    02/09/17 174 lb   02/02/17 173 lb   01/12/17 173 lb 3.2 oz       Questions Regarding Co-Morbidities and Health Concerns Reviewed With Paatient 2/9/2017   Do you currently have Diabetes? No.   Do you currently have Sleep Apnea? No   Do you currently have heartburn, acid reflux, or GERD (acid reflux disease)? Yes, I take prescription medication to treat it.   Do you currently have Hypertension (high blood pressure)? Yes, I take multiple medications to treat it.   Do you currently have Hyperlipidemia (high cholesterol, triglycerides)? No.   Have you been to the Emergency room since your last visit with us? No   Were you in the hospital since your last visit with us? Yes   Do you have any concerns today? no       Diet, Supplement, and Medication Questions Reviewed With Patient 2/9/2017   How many meals do you eat per day? 1   Do you snack between meals? No   How much water are you drinking each day? 1 to 24 ounces (1 to 3 glasses per day)   How much food are you eating at each meal? Less than 1/2 cup   How many servings of protein are you eating per day? 0 to 2   Are you able to avoid liquid calories? Sometimes   Which of the following vitamin supplements do you take? (check all that apply) Other   Which weight loss medications are you taking now? (select all that apply) -   Are you able to tolerate regular texture foods such as chicken, steak, fish, eggs? No       Exercise Questions Reviewed With Patient 2/9/2017   How often do you exercise? 1 to 2 times per week   How much time do you spend exercising on the days you exercise? 1 to 14 minutes.   What types of exercise do you do? walking       Patient Active Problem List   Diagnosis     Peptic ulcer disease     Gastric bypass status for obesity     CARDIOVASCULAR SCREENING; LDL GOAL LESS THAN 160     Chronic abdominal pain     Ulcer (H)     Vomiting     Anemia     ADHD (attention deficit hyperactivity disorder), inattentive type     Vitamin B12 deficiency without  anemia     Thiamine deficiency     Dehydration     Dysphagia     Weight loss, non-intentional     Malnutrition (H)     Chronic anxiety     Constipation     Bile reflux esophagitis     Former smoker     Vitamin D deficiency     Iron deficiency     Health Care Home     Chronic pain     Coagulase negative staph bacteremia associated with intravascular line (H)     Insomnia     Positive blood culture     Fungemia     Chronic nausea     Gastrostomy tube in place (H)     Cervical radiculopathy        PAST MEDICAL HISTORY:  Past Medical History   Diagnosis Date     Tobacco abuse      ADHD (attention deficit hyperactivity disorder)      Gastro-oesophageal reflux disease      Hiatal hernia      Gastric ulcer, unspecified as acute or chronic, without mention of hemorrhage, perforation, or obstruction      Difficulty swallowing      Chronic abdominal pain      Short gut syndrome      Severe malnutrition (H)      TPN     Other bladder disorder      Head injury      Anxiety      Other chronic pain        PAST SURGICAL HISTORY:  Past Surgical History   Procedure Laterality Date     Hernia repair  2006     Umbilical hernia     Endoscopy  03/25/11     EGD, [facility]     Endoscopy  08/04/09     Upper Endoscopy, [facility]     Endoscopy  01/05/09     Upper Endoscopy, [facility]     Herniorrhaphy hiatal  6/22/2012     Procedure: HERNIORRHAPHY HIATAL;;  Surgeon: Francis Vyas MD;  [facility]: [facility]     Celestino en y bowel  2003     Cholecystectomy       Gastrojejunostomy  08/26/09     Extensice enterolysis, partial resect. jejunum, part. resect gastric pouch, gastrojejunostomy anastomosis     Gastrectomy  6/22/2012     Procedure: GASTRECTOMY;  Open Approach, Excise Ulcers,Partial Gastrectomy, Esophagojejunostomy, Hiatal Hernia Repair, Extensive Lysis of Adhesions and Esaphagogastrodudenoscopy.;  Surgeon: Francis Vyas MD;  Location: [facility] OR     Tonsillectomy       C gastric bypass,obese<100cm  gm-en-y  2002     lost 300 pounds     Esophagoscopy, gastroscopy, duodenoscopy (egd), combined  4/20/2011     Procedure:COMBINED ESOPHAGOSCOPY, GASTROSCOPY, DUODENOSCOPY (EGD); Surgeon:FRANCIS VYAS; [facility]     Endoscopic ultrasound upper gastrointestinal tract (gi)  4/29/2011     Procedure:ENDOSCOPIC ULTRASOUND UPPER GASTROINTESTINAL TRACT (GI); Both Procedures done Conjointly; Surgeon:NEREIDA HOUSER; [facility]     Esophagoscopy, gastroscopy, duodenoscopy (egd), combined  6/15/2011     Procedure:COMBINED ESOPHAGOSCOPY, GASTROSCOPY, DUODENOSCOPY (EGD); Surgeon:FRANCIS VYAS; [facility]     Esophagoscopy, gastroscopy, duodenoscopy (egd), combined  6/12/2013     Procedure: COMBINED ESOPHAGOSCOPY, GASTROSCOPY, DUODENOSCOPY (EGD);;  Surgeon: Francis Vyas MD;  Location: [facility]     Hc esoph/gas reflux test w nasal imped electrode  8/5/2013     Procedure: ESOPHAGEAL IMPEDENCE FUNCTION TEST 1 HOUR OR LESS;  Surgeon: Halie Lang MD;  [facility]: [facility]     Endoscopic ultrasound upper gastrointestinal tract (gi)  9/9/2013     Procedure: ENDOSCOPIC ULTRASOUND UPPER GASTROINTESTINAL TRACT (GI);  Endoscopic Ultrasound Guide Gastrostomy Tube Placement  C-arm;  Surgeon: Noe Lizarraga MD;  [facility]: [facility]     Endoscopic insertion tube gastrostomy  9/9/2013     Procedure: ENDOSCOPIC INSERTION TUBE GASTROSTOMY;;  Surgeon: Francis Vyas MD;  [facility]     Laparotomy exploratory  11/22/2013     Procedure: LAPAROTOMY EXPLORATORY;  Exploratory Laparotomy, Upper Endoscopy, Left Inguinal Hernia Repair;  Surgeon: Francis Vyas MD;  [facility]     Herniorrhaphy inguinal  11/22/2013     Procedure: HERNIORRHAPHY INGUINAL;;  Surgeon: Francis Vyas MD;  [facility]     Esophagoscopy, gastroscopy, duodenoscopy (egd), combined  11/22/2013     Procedure: COMBINED ESOPHAGOSCOPY, GASTROSCOPY, DUODENOSCOPY (EGD);;  Surgeon: Francis Vyas MD;  Location:  "[facility]     Esophagoscopy, gastroscopy, duodenoscopy (egd), combined  4/30/2014     Procedure: COMBINED ESOPHAGOSCOPY, GASTROSCOPY, DUODENOSCOPY (EGD);  Surgeon: Francis Vyas MD;  [facility]: [facility]     Appendectomy       Soft tissue surgery       Back surgery  11/3/2014     curve in the spine     Biopsy lymph node cervical N/A 2/20/2015     Procedure: BIOPSY LYMPH NODE CERVICAL;  Surgeon: Baron Scanlon MD;  [facility]: [facility]     Esophagoscopy, gastroscopy, duodenoscopy (egd), combined N/A 2/20/2015     Procedure: COMBINED ESOPHAGOSCOPY, GASTROSCOPY, DUODENOSCOPY (EGD), BIOPSY SINGLE OR MULTIPLE;  Surgeon: Baron Scanlon MD;  Location: [facility]     Soft tissue surgery       Esophagoscopy, gastroscopy, duodenoscopy (egd), combined N/A 9/30/2015     Procedure: COMBINED ESOPHAGOSCOPY, GASTROSCOPY, DUODENOSCOPY (EGD);  Surgeon: Francis Vyas MD;  [facility]       Current Outpatient Prescriptions   Medication     oxyCODONE (ROXICODONE) 5 MG/5ML solution     fentaNYL (DURAGESIC) 50 mcg/hr 72 hr patch     mupirocin (BACTROBAN) 2 % ointment     morphine 0.1% in intrasite topical gel     lidocaine-prilocaine (EMLA) cream     Needle, Disp, (BD DISP NEEDLES) 27G X 1/2\" MISC     ondansetron (ZOFRAN-ODT) 8 MG ODT tab     vitamin D (ERGOCALCIFEROL) 93839 UNIT capsule     sucralfate (CARAFATE) 1 GM/10ML suspension     diazepam (VALIUM) 5 MG tablet     cyanocobalamin (VITAMIN B12) 1000 MCG/ML injection     [START ON 3/6/2017] amphetamine-dextroamphetamine (ADDERALL) 20 MG per tablet     amphetamine-dextroamphetamine (ADDERALL) 20 MG per tablet     amphetamine-dextroamphetamine (ADDERALL) 20 MG per tablet     dextrose 1,000 mL with sodium chloride 76.92 mEq solution     polyethylene glycol (MIRALAX) powder     nystatin-triamcinolone (MYCOLOG II) cream     order for [facility] INFUSION MANAGED PATIENT     senna-docusate (SENOKOT-S;PERICOLACE) 8.6-50 MG per tablet     order for DME     [DISCONTINUED] " NO ACTIVE MEDICATIONS     No current facility-administered medications for this visit.       Allergies   Allergen Reactions     Penicillins Anaphylaxis     Doxycycline Rash     Vancomycin Rash     Rash after receiving vancomycin 3/28/16 (red man's?). Tolerated with slower infusion and diphenhydramine premed.       Review of Systems     Constitutional:  Positive for weight loss, increased energy and confused. Negative for fever, chills, weight gain, fatigue, decreased appetite, night sweats, recent stressors, height loss, post-operative complications, incisional pain, hallucinations and hyperactivity.   HENT:  Negative for ear pain, hearing loss, tinnitus, nosebleeds, trouble swallowing, hoarse voice, mouth sores, sore throat, ear discharge, tooth pain, gum tenderness, taste disturbance, smell disturbance, hearing aid, bleeding gums, dry mouth, sinus pain, sinus congestion and neck mass.    Eyes:  Positive for double vision, pain, eye pain, decreased vision, eye watering, eye dryness, flashing lights, spots, floaters and eye irritation. Negative for redness, eye bulging, strabismus, tunnel vision and jaundice.   Respiratory:   Negative for cough, hemoptysis, sputum production, shortness of breath, wheezing, sleep disturbances due to breathing, snores loudly, respiratory pain, dyspnea on exertion, cough disturbing sleep and postural dyspnea.    Cardiovascular:  Negative for chest pain, dyspnea on exertion, palpitations, orthopnea, claudication, leg swelling, fingers/toes turn blue, hypertension, hypotension, syncope, history of heart murmur, chest pain on exertion, chest pain at rest, pacemaker, few scattered varicosities, leg pain, sleep disturbances due to breathing, tachycardia, light-headedness, exercise intolerance and edema.   Gastrointestinal:  Positive for heartburn, nausea, vomiting, abdominal pain, diarrhea, constipation, blood in stool, bloating and rectal bleeding. Negative for melena, rectal pain,  "hemorrhoids, bowel incontinence, jaundice, coffee ground emesis and change in stool.   Genitourinary:  Positive for bladder incontinence, dysuria, urgency, difficulty urinating and voiding less frequently. Negative for hematuria, flank pain, nocturia, scrotal pain, ulcerations, penile discharge, male genitourinary complaint and reduced libido.   Musculoskeletal:  Positive for myalgias, back pain, joint swelling, arthralgias, stiffness, muscle cramps, neck pain, bone pain, muscle weakness and fracture.   Skin:  Positive for itching, scarring, flaky skin, Raynaud's phenomenon and sensitivity to sunlight. Negative for nail changes, poor wound healing, rash, hair changes, skin changes, acne, warts, poor wound healing and skin thickening.   Neurological:  Negative for dizziness, tingling, tremors, speech change, seizures, loss of consciousness, weakness, light-headedness, numbness, headaches, disturbances in coordination, extremity numbness, memory loss, difficulty walking and paralysis.   Endo/Heme:  Negative for anemia, swollen glands and bruises/bleeds easily.   Psychiatric/Behavioral:  Negative for depression, hallucinations, memory loss, decreased concentration, mood swings and panic attacks.    Endocrine:  Positive for altered temperature regulation.Negative for polyphagia, polydipsia and change in facial hair.      PHYSICAL EXAMINATION:  /68 mmHg  Pulse 88  Temp(Src) 98.2  F (36.8  C) (Temporal)  Ht 5' 11\"  Wt 174 lb  BMI 24.28 kg/m2   Body mass index is 24.28 kg/(m^2).   NAD NCAT  Respiratory: breathing unlabored  Abdomen: s/nt/nd; inc c/d/i;     ASSESSMENT AND PLAN:  46 year old male 14  year(s) status  post open gastric bypass.      Procedure Note:  I removed patients prior 4.0 cm 20 Fr COLIN G-tube. I replaced thsi with a 3.0 cm 18 Fr G tube, it fit well and spun easily. The balloon was inflated with 4cc of water.     1. Continue TPN for nutritional support. Diet as tolerated  2. F/u routine " bariatric diet guidelines.  See guidelines below.  3. Follow-up: 1 year  4. Ursodial is not needed.  5. See dietitian today: No.    I emphasized exercise and activity behavior along with appropriate food choice as the main foundation for weight loss.  Surgery is to provide surgical reinforcement of the appropriate behavior set.    If you have any questions about our plans, please don't hesitate to contact me.     Sincerely,    Margie Arguelles MD    Physician Attestation  I, Francis Vyas, saw and evaluated this patient as part of a shared visit.  I have reviewed and discussed with the advanced practice provider and/or resident their history, physical and plan.    I personally reviewed the vital signs, medications, labs and imaging.    My key history or physical exam findings: stable.  Weight is fine.  G-tube replaced.    Key management decisions made by me: as noted.    Francis Vyas MD  Date of Service (when I saw the patient): 2/9/2017      GUIDELINES FOR BARIATRIC SURGERY PATIENTS    FOLLOW UP:    1. Follow-up intervals: during the first year: 1 week, 1 month, 3, 6, 12 months; then every 6 months until year 5; annually thereafter.  The goal of follow-up visits is to ensure that food intake behavior (choice of food and eating speed) and exercise/activity level are set appropriately.  Patients who have had a adjustable gastric band may be seen more frequently the first year for band adjustments and will be asked to get an annual UGI xray with 6 sec cine to evaluate band position and function.  2. The following labs are ordered by our clinic at 3 months after surgery and annually: lipids, comprehensive metabolic panel, hemoglobin, ferritin, parathormone, prealbumin, A1C, copper, zinc and vitamins A, B1, B12, and D.  3. Regular exercise and activity as tolerated is key to long term success.    COMPLICATIONS:    1. The bariatric team should be aware and potentially evaluate all adverse gastrointestinal  symptoms (dysphagia, abdominal pain, nausea, vomiting, diarrhea, heartburn, reflux, etc), which can be a sign of complication.  The bariatric surgeons perform general surgery procedures in addition to bariatric surgery (laparoscopic cholecystectomy, etc.).  2. Inability to tolerate textured food (chicken, steak, fish, eggs, beans) is NOT normal and may need to be evaluated by endoscopy performed by the bariatric surgeon.  3. Our Call Center line is [phone] for appointments, triage nurse and clinic nurse access. Ask to be connected with the clinic nurse if complications/concerns need to addressed.    SUPPLEMENTS:    1.If patient had an adjustable gastric band or vertical banded gastroplasty:    a. Multivitamin/minerals with iron, once a day, orally.  b. Calcium Citrate 500mg with vitamin D 200mg, three times a day, orally.    2. If patient had a sleeve gastrectomy:    a. Multivitamin/minerals with iron, once a day, orally.  b. Calcium Citrate 500mg with vitamin D, three times a day, orally.  c. Vitamin B12 500mcg per day SL or 1000mcg/ml.  If vitamin B12 is stable, can switch to a B-complex vitamin at 1 year.    3. If patient had a Celestino-en-Y gastric bypass:    a. Multivitamin/minerals with iron, twice a day, orally.  b. Calcium Citrate 500mg with vitamin D three times a day, orally.  c. Vitamin B12 500mcg per day SL or 1000mcg/ml,  If vitamin B12 is stable, can switch to a B-complex vitamin at 1 year.  4. If patient had a duodenal switch or a Celestino-en-Y gastric bypass with a long limb:    a. Multivitamin/minerals with iron, twice a day, orally.  b. Calcium Citrate 600mg with vitamin D four times a day, orally.  c. Vitamin B12 500mcg per day SL or 1000mcg/ml.   d. ADEK / Source CF or Aquadek three times a day with meals.  Each ADEK contains the following fat soluable vitamins: vitamin A - 5667 IU; vitamin D - 400 IU; vitamin E - 150 IU; and vitamin K - 150 mcg.    BARIATRIC DIET   1. Day 1: Clear liquids  2. Day  2-14: Full liquids  3. Day 14-27: Pureed foods  4. Day 28-55: Soft foods  5. Day 56-84: Regular foods  6. Regular foods:  -eat 1/2 cup over 30 minutes.  -avoid drinking during meals and for 30 minutes before and after meals.  -focus on eating until hunger goes away rather than eating all the way to a full feeling.  -eat protein sources of food first (steak, chicken, fish, beans, eggs), followed by leafy vegetables (spinach, kale, amarilis, mixed greens, etc.) or nonstarch vegetables second, then solid fruits.  -only eat foods that require a fork, those foods are more apt to provide hunger suppression by staying in the pouch. Using this rule helps avoid choosing the wrong types of food for pouch operations. The wrong foods include liquid calories, crumbly foods, and starch based foods.  -avoid liquid calories: soup, juice, soda, slimfast, ice cream, and alcohol.  -avoid crumbly foods: chips, crackers, and cookies.  -avoid starch based foods: pasta, too much rice, and too much bread.    Again, thank you for allowing me to participate in the care of your patient.      Sincerely,    Francis Vyas MD

## 2017-02-09 NOTE — TELEPHONE ENCOUNTER
Reason for call:  Medication  Reason for Call:  Medication or medication refill:    Do you use a [facility]?  Name of the pharmacy and phone number for the current request:  will  at the     Name of the medication requested: morphine 0.1% in intrasite topical gel    Other request: patient forgot to get this today at his appointment please call when ready to     Can we leave a detailed message on this number? YES    Phone number patient can be reached at: Home number on file [phone] (home)    Best Time: any    Call taken on 2/9/2017 at 2:51 PM by Estelle Hercules

## 2017-02-09 NOTE — PATIENT INSTRUCTIONS
It was a pleasure meeting with you today.     Thank you for allowing us the privilege of caring for you. We hope we provided you with the excellent service you deserve.     Please let us know if there is anything else we can do for you so that we can be sure you are leaving completely satisfied with your care experience.      You saw Dr Vyas today.     Instructions per today's visit: Follow up annually       To schedule appointments with our team, please call [phone] option #1    Please call during clinic hours Monday through Friday 8:00a - 4:00p if you have questions or you can contact us via [employer] at anytime.      Nurses: [phone] Option # 3 for nurse advice line.  Fax: [phone]  Surgery Scheduler: [phone]    Please call the hospital at [phone] to speak with our on call MDs if you have urgent needs after hours, during weekends, or holidays.      Main follow-up parameters for all bariatric patients    1. Follow-up interval during the first year: ~1 week, 1 month, 3 month, 6 month,12 months.  Every 6 months until 5 years; and then annually thereafter.  The goal of follow-up sessions is to ensure that food intake behavior (choice of food and eating speed) and exercise/activity level are set appropriately.    2. Yearly lab tests ordered by our clinic.      3. The bariatric team should be aware and potentially evaluate all adverse gastrointestinal symptoms (dysphagia, abdominal pain, nausea, vomiting, diarrhea, heartburn, reflux, etc), which can be a sign of complication.  The bariatric surgeons perform general surgery procedures in addition to bariatric surgery (laparoscopic cholecystectomy, etc.)    4. Inability to tolerate textured food (chicken, steak, fish, eggs, beans) is NOT normal and may need to be evaluated by endoscopy performed by the bariatric surgeon.

## 2017-02-09 NOTE — TELEPHONE ENCOUNTER
morphine 0.1% in intrasite topical gel      Last Written Prescription Date:  1/24/2017  Last Fill Quantity: 100g,   # refills: 0  Last Office Visit with [facility], [facility] or [facility] prescribing provider: 2/9/2017  Future Office visit:    Next 5 appointments (look out 90 days)     Feb 10, 2017  4:00 PM   [facility]    [address]  [place] [facility] [place] [address]   [phone]            Feb 13, 2017  1:00 PM   Office Visit with SAILAJA Del Cid [facility] ([facility])    [address]  [place] [address]   [phone]            Apr 17, 2017 11:00 AM   Office Visit with Esteban Daly MD   [facility]    [address]  [place] [address]   [phone]                   Routing refill request to provider for review/approval because:  Drug not on the [facility], [facility] or [facility] refill protocol or controlled substance

## 2017-02-10 ENCOUNTER — TELEPHONE (OUTPATIENT)
Dept: FAMILY MEDICINE | Facility: CLINIC | Age: 47
End: 2017-02-10

## 2017-02-10 LAB
ALBUMIN SERPL-MCNC: 3.4 G/DL (ref 3.4–5)
ALP SERPL-CCNC: 82 U/L (ref 40–150)
ALT SERPL W P-5'-P-CCNC: 16 U/L (ref 0–70)
ANION GAP SERPL CALCULATED.3IONS-SCNC: 8 MMOL/L (ref 3–14)
AST SERPL W P-5'-P-CCNC: 14 U/L (ref 0–45)
BASOPHILS # BLD AUTO: 0 10E9/L (ref 0–0.2)
BASOPHILS NFR BLD AUTO: 0.4 %
BILIRUB SERPL-MCNC: 0.6 MG/DL (ref 0.2–1.3)
BUN SERPL-MCNC: 17 MG/DL (ref 7–30)
CALCIUM SERPL-MCNC: 8.3 MG/DL (ref 8.5–10.1)
CHLORIDE SERPL-SCNC: 106 MMOL/L (ref 94–109)
CO2 SERPL-SCNC: 29 MMOL/L (ref 20–32)
CREAT SERPL-MCNC: 0.62 MG/DL (ref 0.66–1.25)
CRP SERPL-MCNC: 7.6 MG/L (ref 0–8)
DIFFERENTIAL METHOD BLD: ABNORMAL
EOSINOPHIL # BLD AUTO: 0.3 10E9/L (ref 0–0.7)
EOSINOPHIL NFR BLD AUTO: 4 %
ERYTHROCYTE [DISTWIDTH] IN BLOOD BY AUTOMATED COUNT: 14.4 % (ref 10–15)
FERRITIN SERPL-MCNC: 33 NG/ML (ref 26–388)
GFR SERPL CREATININE-BSD FRML MDRD: ABNORMAL ML/MIN/1.7M2
GLUCOSE SERPL-MCNC: 74 MG/DL (ref 70–99)
HCT VFR BLD AUTO: 37 % (ref 40–53)
HGB BLD-MCNC: 12.3 G/DL (ref 13.3–17.7)
IMM GRANULOCYTES # BLD: 0 10E9/L (ref 0–0.4)
IMM GRANULOCYTES NFR BLD: 0 %
IRON SERPL-MCNC: 50 UG/DL (ref 35–180)
LYMPHOCYTES # BLD AUTO: 2.3 10E9/L (ref 0.8–5.3)
LYMPHOCYTES NFR BLD AUTO: 33.8 %
MAGNESIUM SERPL-MCNC: 2.1 MG/DL (ref 1.6–2.3)
MCH RBC QN AUTO: 31.4 PG (ref 26.5–33)
MCHC RBC AUTO-ENTMCNC: 33.2 G/DL (ref 31.5–36.5)
MCV RBC AUTO: 94 FL (ref 78–100)
MONOCYTES # BLD AUTO: 0.7 10E9/L (ref 0–1.3)
MONOCYTES NFR BLD AUTO: 10.2 %
NEUTROPHILS # BLD AUTO: 3.6 10E9/L (ref 1.6–8.3)
NEUTROPHILS NFR BLD AUTO: 51.6 %
NRBC # BLD AUTO: 0 10*3/UL
NRBC BLD AUTO-RTO: 0 /100
PHOSPHATE SERPL-MCNC: 3.7 MG/DL (ref 2.5–4.5)
PLATELET # BLD AUTO: 151 10E9/L (ref 150–450)
POTASSIUM SERPL-SCNC: 3.7 MMOL/L (ref 3.4–5.3)
PREALB SERPL IA-MCNC: 22 MG/DL (ref 15–45)
PROT SERPL-MCNC: 6.7 G/DL (ref 6.8–8.8)
RBC # BLD AUTO: 3.92 10E12/L (ref 4.4–5.9)
SODIUM SERPL-SCNC: 143 MMOL/L (ref 133–144)
TRIGL SERPL-MCNC: 42 MG/DL
WBC # BLD AUTO: 6.9 10E9/L (ref 4–11)

## 2017-02-10 PROCEDURE — 82525 ASSAY OF COPPER: CPT | Performed by: SURGERY

## 2017-02-10 PROCEDURE — 84134 ASSAY OF PREALBUMIN: CPT | Performed by: SURGERY

## 2017-02-10 PROCEDURE — 85025 COMPLETE CBC W/AUTO DIFF WBC: CPT | Performed by: SURGERY

## 2017-02-10 PROCEDURE — 84255 ASSAY OF SELENIUM: CPT | Performed by: SURGERY

## 2017-02-10 PROCEDURE — 86140 C-REACTIVE PROTEIN: CPT | Performed by: SURGERY

## 2017-02-10 PROCEDURE — 84630 ASSAY OF ZINC: CPT | Performed by: SURGERY

## 2017-02-10 PROCEDURE — 80053 COMPREHEN METABOLIC PANEL: CPT | Performed by: SURGERY

## 2017-02-10 PROCEDURE — 83540 ASSAY OF IRON: CPT | Performed by: SURGERY

## 2017-02-10 PROCEDURE — 84100 ASSAY OF PHOSPHORUS: CPT | Performed by: SURGERY

## 2017-02-10 PROCEDURE — 83785 ASSAY OF MANGANESE: CPT | Performed by: SURGERY

## 2017-02-10 PROCEDURE — 84478 ASSAY OF TRIGLYCERIDES: CPT | Performed by: SURGERY

## 2017-02-10 PROCEDURE — 83735 ASSAY OF MAGNESIUM: CPT | Performed by: SURGERY

## 2017-02-10 PROCEDURE — 82728 ASSAY OF FERRITIN: CPT | Performed by: SURGERY

## 2017-02-10 ASSESSMENT — PATIENT HEALTH QUESTIONNAIRE - PHQ9: SUM OF ALL RESPONSES TO PHQ QUESTIONS 1-9: 0

## 2017-02-10 ASSESSMENT — ANXIETY QUESTIONNAIRES: GAD7 TOTAL SCORE: 7

## 2017-02-10 NOTE — TELEPHONE ENCOUNTER
Patient's wife stated he uses the medication for his port. Does not use it for any other site at this time.  He has been using it for 2 months.    Called Crist[employer] for PA form

## 2017-02-10 NOTE — TELEPHONE ENCOUNTER
Prior Authorization  - morphine 0.1% in intrasite topical gel    Would you like to proceed with prior authorization or change medication?     Rationale for PA request?    What medications has patient tried & failed?

## 2017-02-10 NOTE — TELEPHONE ENCOUNTER
Ins needs a prior auth for the compound of Morphine Intrasite 0.1% gel  Ins is [employer] at [phone] and id#[id number]  Sharon Fair, Pharmacy [facility] [place] [phone]

## 2017-02-10 NOTE — TELEPHONE ENCOUNTER
Start PA- call wife/pt. For uses- he might also use for PEG site? Including central line.Lizbeth Lester

## 2017-02-12 LAB
COPPER SERPL-MCNC: 119 UG/DL
ZINC SERPL-MCNC: 67 UG/ML

## 2017-02-13 ENCOUNTER — OFFICE VISIT (OUTPATIENT)
Dept: FAMILY MEDICINE | Facility: CLINIC | Age: 47
End: 2017-02-13
Payer: COMMERCIAL

## 2017-02-13 ENCOUNTER — RADIANT APPOINTMENT (OUTPATIENT)
Dept: CARDIOLOGY | Facility: CLINIC | Age: 47
End: 2017-02-13
Attending: NURSE PRACTITIONER
Payer: COMMERCIAL

## 2017-02-13 VITALS — DIASTOLIC BLOOD PRESSURE: 77 MMHG | HEART RATE: 69 BPM | SYSTOLIC BLOOD PRESSURE: 128 MMHG

## 2017-02-13 VITALS
RESPIRATION RATE: 10 BRPM | HEIGHT: 71 IN | BODY MASS INDEX: 27.58 KG/M2 | HEART RATE: 92 BPM | WEIGHT: 197 LBS | TEMPERATURE: 99.3 F | SYSTOLIC BLOOD PRESSURE: 110 MMHG | DIASTOLIC BLOOD PRESSURE: 78 MMHG

## 2017-02-13 DIAGNOSIS — R53.81 PHYSICAL DECONDITIONING: ICD-10-CM

## 2017-02-13 DIAGNOSIS — F41.9 CHRONIC ANXIETY: ICD-10-CM

## 2017-02-13 DIAGNOSIS — I42.9 CARDIOMYOPATHY, UNSPECIFIED (H): ICD-10-CM

## 2017-02-13 DIAGNOSIS — F90.9 ATTENTION DEFICIT HYPERACTIVITY DISORDER (ADHD), UNSPECIFIED ADHD TYPE: ICD-10-CM

## 2017-02-13 DIAGNOSIS — G47.00 PERSISTENT INSOMNIA: ICD-10-CM

## 2017-02-13 DIAGNOSIS — F41.9 ANXIETY: ICD-10-CM

## 2017-02-13 DIAGNOSIS — F90.0 ADHD (ATTENTION DEFICIT HYPERACTIVITY DISORDER), INATTENTIVE TYPE: ICD-10-CM

## 2017-02-13 DIAGNOSIS — E43 SEVERE MALNUTRITION (H): ICD-10-CM

## 2017-02-13 DIAGNOSIS — Z72.0 TOBACCO ABUSE: ICD-10-CM

## 2017-02-13 DIAGNOSIS — K90.829 SHORT GUT SYNDROME: Primary | ICD-10-CM

## 2017-02-13 PROCEDURE — 99213 OFFICE O/P EST LOW 20 MIN: CPT | Performed by: NURSE PRACTITIONER

## 2017-02-13 PROCEDURE — 93350 STRESS TTE ONLY: CPT | Mod: TC | Performed by: INTERNAL MEDICINE

## 2017-02-13 PROCEDURE — 93016 CV STRESS TEST SUPVJ ONLY: CPT | Performed by: INTERNAL MEDICINE

## 2017-02-13 PROCEDURE — 93018 CV STRESS TEST I&R ONLY: CPT | Performed by: INTERNAL MEDICINE

## 2017-02-13 PROCEDURE — 93017 CV STRESS TEST TRACING ONLY: CPT | Performed by: INTERNAL MEDICINE

## 2017-02-13 PROCEDURE — 93352 ADMIN ECG CONTRAST AGENT: CPT | Performed by: INTERNAL MEDICINE

## 2017-02-13 PROCEDURE — 93350 STRESS TTE ONLY: CPT | Mod: 26 | Performed by: INTERNAL MEDICINE

## 2017-02-13 PROCEDURE — 93321 DOPPLER ECHO F-UP/LMTD STD: CPT | Mod: TC | Performed by: INTERNAL MEDICINE

## 2017-02-13 PROCEDURE — 93325 DOPPLER ECHO COLOR FLOW MAPG: CPT | Mod: 26 | Performed by: INTERNAL MEDICINE

## 2017-02-13 PROCEDURE — 93321 DOPPLER ECHO F-UP/LMTD STD: CPT | Mod: 26 | Performed by: INTERNAL MEDICINE

## 2017-02-13 PROCEDURE — 93325 DOPPLER ECHO COLOR FLOW MAPG: CPT | Mod: TC | Performed by: INTERNAL MEDICINE

## 2017-02-13 RX ORDER — DOBUTAMINE HYDROCHLORIDE 200 MG/100ML
20 INJECTION INTRAVENOUS CONTINUOUS
Status: DISCONTINUED | OUTPATIENT
Start: 2017-02-13 | End: 2017-09-22

## 2017-02-13 RX ORDER — DEXTROAMPHETAMINE SACCHARATE, AMPHETAMINE ASPARTATE, DEXTROAMPHETAMINE SULFATE AND AMPHETAMINE SULFATE 5; 5; 5; 5 MG/1; MG/1; MG/1; MG/1
20 TABLET ORAL 2 TIMES DAILY
Qty: 60 TABLET | Refills: 0 | Status: CANCELLED | OUTPATIENT
Start: 2017-03-06

## 2017-02-13 RX ORDER — DIAZEPAM 5 MG
TABLET ORAL
Qty: 60 TABLET | Refills: 2 | Status: CANCELLED | OUTPATIENT
Start: 2017-02-13

## 2017-02-13 RX ORDER — HEPARIN SODIUM (PORCINE) LOCK FLUSH IV SOLN 100 UNIT/ML 100 UNIT/ML
5 SOLUTION INTRAVENOUS ONCE
Status: COMPLETED | OUTPATIENT
Start: 2017-02-13 | End: 2017-02-13

## 2017-02-13 RX ADMIN — DOBUTAMINE HYDROCHLORIDE 20 MCG/KG/MIN: 200 INJECTION INTRAVENOUS at 15:49

## 2017-02-13 RX ADMIN — HEPARIN SODIUM (PORCINE) LOCK FLUSH IV SOLN 100 UNIT/ML 5 ML: 100 SOLUTION at 15:54

## 2017-02-13 ASSESSMENT — ANXIETY QUESTIONNAIRES
GAD7 TOTAL SCORE: 2
2. NOT BEING ABLE TO STOP OR CONTROL WORRYING: NOT AT ALL
IF YOU CHECKED OFF ANY PROBLEMS ON THIS QUESTIONNAIRE, HOW DIFFICULT HAVE THESE PROBLEMS MADE IT FOR YOU TO DO YOUR WORK, TAKE CARE OF THINGS AT HOME, OR GET ALONG WITH OTHER PEOPLE: SOMEWHAT DIFFICULT
7. FEELING AFRAID AS IF SOMETHING AWFUL MIGHT HAPPEN: NOT AT ALL
1. FEELING NERVOUS, ANXIOUS, OR ON EDGE: NOT AT ALL
5. BEING SO RESTLESS THAT IT IS HARD TO SIT STILL: SEVERAL DAYS
6. BECOMING EASILY ANNOYED OR IRRITABLE: NOT AT ALL
3. WORRYING TOO MUCH ABOUT DIFFERENT THINGS: NOT AT ALL

## 2017-02-13 ASSESSMENT — PATIENT HEALTH QUESTIONNAIRE - PHQ9: 5. POOR APPETITE OR OVEREATING: SEVERAL DAYS

## 2017-02-13 ASSESSMENT — PAIN SCALES - GENERAL: PAINLEVEL: NO PAIN (0)

## 2017-02-13 NOTE — MR AVS SNAPSHOT
After Visit Summary   2/13/2017    Parker Acevedo     MRN: [id number]           Patient Information     Date Of Birth          1970        Visit Information        Provider Department      2/13/2017 1:00 PM Lizbeth Lester APRN CNP [facility] [place]        Today's Diagnoses     Short gut syndrome    -  1    Anxiety        ADHD (attention deficit hyperactivity disorder), inattentive type        Persistent insomnia        Chronic anxiety        Cardiomyopathy, unspecified (H)        Severe malnutrition (H)        Attention deficit hyperactivity disorder (ADHD), unspecified ADHD type        Tobacco abuse           Follow-ups after your visit        Your next 10 appointments already scheduled     Feb 15, 2017   Procedure with Darren Campos MD   [facility] ([facility])    [address] [address]   [phone]           From [address]: Exit at [address] on south side of [place]. Turn right on [address]. Turn left at stoplight on [address]. [facility] will be in view two blocks ahead            Feb 15, 2017 12:00 PM CST   XR SURGERY MARLENY LESS THAN 5 MIN FLUORO W STILLS with PHCARM2   [facility] ([facility])    [address] [address]   [phone]           Please bring a list of your current medicines to your exam. (Include vitamins, minerals and over-thecounter medicines.) Leave your valuables at home.  Tell your doctor if there is a chance you may be pregnant.  You do not need to do anything special for this exam.            Apr 17, 2017 11:00 AM CDT   Office Visit with Esteban Daly MD   [facility] [place] ([facility] [place])    [address], [address]  [place] [address] [phone]           Bring a current list of meds and any records pertaining to this visit.  For Physicals, please bring immunization records  "and any forms needing to be filled out.  Please arrive 10 minutes early to complete paperwork.              Who to contact     If you have questions or need follow up information about today's clinic visit or your schedule please contact [facility] directly at [phone].  Normal or non-critical lab and imaging results will be communicated to you by MyChart, letter or phone within 4 business days after the clinic has received the results. If you do not hear from us within 7 days, please contact the clinic through [employer] or phone. If you have a critical or abnormal lab result, we will notify you by phone as soon as possible.  Submit refill requests through [employer] or call your pharmacy and they will forward the refill request to us. Please allow 3 business days for your refill to be completed.          Additional Information About Your Visit        [employer] Information     [employer] gives you secure access to your electronic health record. If you see a primary care provider, you can also send messages to your care team and make appointments. If you have questions, please call your primary care clinic.  If you do not have a primary care provider, please call [phone] and they will assist you.        Care EveryWhere ID     This is your Care EveryWhere ID. This could be used by other organizations to access your [facility] medical records  [id number]        Your Vitals Were     Pulse Temperature Respirations Height BMI (Body Mass Index)       92 99.3  F (37.4  C) (Temporal) 10 5' 11\" (1.803 m) 27.48 kg/m2        Blood Pressure from Last 3 Encounters:   02/13/17 128/77   02/13/17 110/78   02/09/17 112/68    Weight from Last 3 Encounters:   02/13/17 197 lb (89.4 kg)   02/09/17 174 lb (78.9 kg)   02/09/17 174 lb (78.9 kg)              We Performed the Following     HEART FAILURE ACTION PLAN     TOBACCO CESSATION - FOR HEALTH MAINTENANCE        Primary Care Provider Office Phone # Fax #    Esteban Miller " "MD Addy [phone]       [facility] [place] [address]  [place] [address]        Thank you!     Thank you for choosing [facility] [place]  for your care. Our goal is always to provide you with excellent care. Hearing back from our patients is one way we can continue to improve our services. Please take a few minutes to complete the written survey that you may receive in the mail after your visit with us. Thank you!             Your Updated Medication List - Protect others around you: Learn how to safely use, store and throw away your medicines at www.disposemymeds.org.          This list is accurate as of: 2/13/17  5:29 PM.  Always use your most recent med list.                   Brand Name Dispense Instructions for use    * amphetamine-dextroamphetamine 20 MG per tablet    ADDERALL    60 tablet    Take 1 tablet (20 mg) by mouth 2 times daily       * amphetamine-dextroamphetamine 20 MG per tablet    ADDERALL    60 tablet    Take 1 tablet (20 mg) by mouth 2 times daily       * amphetamine-dextroamphetamine 20 MG per tablet   Start taking on:  3/6/2017    ADDERALL    60 tablet    Take 1 tablet (20 mg) by mouth 2 times daily       cyanocobalamin 1000 MCG/ML injection    VITAMIN B12    1 mL    Inject 1 mL (1,000 mcg) into the muscle every 30 days       dextrose 1,000 mL with sodium chloride 76.92 mEq solution     2640 mL    Inject 110 mL/hr into the vein continuous       diazepam 5 MG tablet    VALIUM    60 tablet    5 mg each morning and 5 mg at bedtime       [facility] HOME INFUSION MANAGED PATIENT      Contact [facility] for patient specific medication information at [phone] on admission and discharge from the hospital.  Phones are answered 24 hours a day 7 days a week 365 days a year.  Providers - Choose \"CONTINUE HOME MED (no script)\" at discharge if patient treatment with home infusion will continue.       fentaNYL 50 mcg/hr 72 hr patch    DURAGESIC    15 patch " "   Place 1 patch onto the skin every 48 hours MYLAN BRAND ONLY. Fill on/after 2/23/17 to start on/after 2/25/17       lidocaine-prilocaine cream    EMLA    30 g    Apply topically as needed for moderate pain       morphine 0.1% in intrasite topical gel     100 g    Apply as needed prior to accessing the port site.       mupirocin 2 % ointment    BACTROBAN    30 g    Apply topically 3 times daily       Needle (Disp) 27G X 1/2\" Misc    BD DISP NEEDLES    3 each    1 Device every 30 days Use for cyanocobalamin injection once q 30 days.       nystatin-triamcinolone cream    MYCOLOG II    60 g    Apply topically 2 times daily as needed       ondansetron 8 MG ODT tab    ZOFRAN-ODT    90 tablet    Take 1 tablet (8 mg) by mouth every 8 hours as needed for nausea       * order for DME     12 each    Injection Supplies for Vitamin B12: 3cc syringes w/ 27 gauge needles, 1/2 inch length       * order for DME     4 each    Equipment being ordered: Bilateral knee high chronic venous insufficiency stockings--  mild-moderate pressures.       oxyCODONE 5 MG/5ML solution    ROXICODONE    1350 mL    Take 10-15 mLs (10-15 mg) by mouth every 4 hours as needed for moderate to severe pain Max of 45mg per day. Fill on/after 2/8/17 not to start till 2/10/17.       polyethylene glycol powder    MIRALAX    510 g    Take 17 g (1 capful) by mouth daily as needed       senna-docusate 8.6-50 MG per tablet    SENOKOT-S;PERICOLACE    120 tablet    Take 1-2 tablets by mouth 2 times daily as needed for constipation       sucralfate 1 GM/10ML suspension    CARAFATE    1200 mL    Take 10 mLs (1 g) by mouth 4 times daily       vitamin D 38985 UNIT capsule    ERGOCALCIFEROL    12 capsule    Take 1 capsule (50,000 Units) by mouth every 7 days       * Notice:  This list has 5 medication(s) that are the same as other medications prescribed for you. Read the directions carefully, and ask your doctor or other care provider to review them with you.      "

## 2017-02-13 NOTE — NURSING NOTE
IV started with a 20g Jelco catheter in the port accessed prior to arrival, left accessed.     Stress portion of Dobutamine Echo started utilizing 20 mcg/kg/min of Dobutamine.   Dobutamine increased to 50 mcg/kg/min by increments of 5 mcg/kg/min.                                                                            Dobutamine NDC# 0869-4507-34  Definity given  0 ml , 0 wasted  ml       1.5ml Definity mixed with 8.5ml Saline - NDC 96461-653-18  Optison  Given 7 ml, 2 wasted   3ml Optison mixed with 6ml Saline - [id number]                    Atropine medication given  0 mg                    Atropine NDC# 27762-125-94  Metoprolol medication given 1  mg                   Metoprolol NDC# [address]-78    Dr. Barrientos was the supervising physician of this test.

## 2017-02-13 NOTE — TELEPHONE ENCOUNTER
Never received a medication specific fax from [facility] [employer].  general PA placed in providers inbox to complete and fax back

## 2017-02-13 NOTE — NURSING NOTE
"Chief Complaint   Patient presents with     Forms     Panel Management     Tobacco Cessation, PHQ9, GAD7       Initial /78  Pulse 92  Temp 99.3  F (37.4  C) (Temporal)  Resp 10  Ht 5' 11\" (1.803 m)  Wt 197 lb (89.4 kg)  BMI 27.48 kg/m2 Estimated body mass index is 27.48 kg/(m^2) as calculated from the following:    Height as of this encounter: 5' 11\" (1.803 m).    Weight as of this encounter: 197 lb (89.4 kg).  Medication Reconciliation: complete     Fabiola Jain CMA  "

## 2017-02-13 NOTE — LETTER
My Heart Failure Action Plan   Name: Parker Acevedo Sr    YOB: 1970   Date: 2/13/2017    My doctor: Esteban Daly     [facility]     [address] [place] [address], [address]  [place] [id number] [phone]  My Diagnosis: Systolic Heart Failure   My Ejection Fraction: 45% - 49%    My Exercise Goal: as active as able  .     My Weight Goal:   Wt Readings from Last 2 Encounters:   02/13/17 197 lb (89.4 kg)   02/09/17 174 lb (78.9 kg)     Weigh yourself daily using the same scale. If you gain more than 2 pounds in 24 hours or 5 pounds in a week call the clinic    My Diet Goal: No added salt    Emergency Room Visits:    Our goal is to improve your quality of life and help you avoid a visit to the emergency room or hospital.  If we work together, we can achieve this goal. But, if you feel you need to call 911 or go to the emergency room, please do so.  If you go to the emergency room, please bring your list of medicines and your daily weight chart with you.       GREEN ZONE     Doing well today    Weight gained is no more than 2 pounds a day or 5 pounds a week.    No swelling in feet, ankles, legs or stomach.    No more swelling than usual.    No more trouble breathing than usual.    No change in my sleep.    No other problems. Actions:    I am doing fine.  I will take my medicine, follow my diet, see my doctor, exercise, and watch for symptoms.           YELLOW ZONE         Having a bad day or flare up    Weight gain of more than 2 pounds in one day or 5 pounds in one week.    New swelling in ankle, leg, knee or thigh.    Bloating in belly, pants feel tighter.    Swelling in hands or face.    Coughing or trouble breathing while walking or talking.    Harder to breathe last night.    Have trouble sleeping, wake up short of breath.    Much more tired than usual.    Not eating.    Pain in my chest or bad leg cramps.    Feel weak or dizzy. Actions:    I need to take action and call my  doctor or nurse today.                 RED ZONE         Need medical care now    Weight gain of 5 pounds overnight.    Chest pain or pressure that does not go away.    Feel less alert.    Wheezing or have trouble breathing when at rest.    Cannot sleep lying down.    Cannot take my water pill.    Pass out or faint. Actions:    I need to call my doctor or nurse now!    Call 911 if I have chest pain or cannot breathe.        Electronically signed by: Lizbeth Lester, February 13, 2017

## 2017-02-14 ENCOUNTER — MYC MEDICAL ADVICE (OUTPATIENT)
Dept: PALLIATIVE MEDICINE | Facility: CLINIC | Age: 47
End: 2017-02-14

## 2017-02-14 DIAGNOSIS — Z79.891 ENCOUNTER FOR LONG-TERM OPIATE ANALGESIC USE: ICD-10-CM

## 2017-02-14 LAB
MANGANESE SERPL-MCNC: NORMAL NG/ML
SELENIUM SERPL-MCNC: 88 NG/ML

## 2017-02-14 ASSESSMENT — PATIENT HEALTH QUESTIONNAIRE - PHQ9: SUM OF ALL RESPONSES TO PHQ QUESTIONS 1-9: 8

## 2017-02-14 ASSESSMENT — ANXIETY QUESTIONNAIRES: GAD7 TOTAL SCORE: 2

## 2017-02-14 NOTE — TELEPHONE ENCOUNTER
According to the notes pt is having the following surgery tomorrow:  COMBINED DISCECTOMY, FUSION CERVICAL ANTERIOR ONE LEVEL    Routed to Dr. Milligan to review.    Demetrice Yeh, RN-BSN  [facility]

## 2017-02-14 NOTE — TELEPHONE ENCOUNTER
Per patient Tomasa message:    Created: 2/14/2017 2:51 PM      I just want to check with you to see if it is ok for the next two weeks after surgery I go up to 60mg per day instead of the 45mg please let me know thanks again

## 2017-02-14 NOTE — H&P (VIEW-ONLY)
[facility] [place]  [address], [address]  [place] [address]  [phone]  Dept: [phone]    PRE-OP EVALUATION:  Today's date: 2017    Parker Acevedo  (: 1970) presents for pre-operative evaluation assessment as requested by Dr. Campos.  He requires evaluation and anesthesia risk assessment prior to undergoing surgery/procedure for treatment of cervical spinal stenosis.  Proposed procedure: DISCECTOMY, FUSION CERVICAL ANTERIOR ONE LEVEL CERVICAL 5-6    Date of Surgery/ Procedure: 2/15/2017  Time of Surgery/ Procedure: 12:00pm  Hospital/Surgical Facility: [facility]  Fax number for surgical facility:   Primary Physician: Esteban Daly  Type of Anesthesia Anticipated: General    Patient has a Health Care Directive or Living Will:  YES -on file     Preop Questions 2/3/2017   1.  Do you have a history of heart attack, stroke, stent, bypass or surgery on an artery in the head, neck, heart or legs? No   2.  Do you ever have any pain or discomfort in your chest? No   3.  Do you have a history of  Heart Failure? No   4.   Are you troubled by shortness of breath when:  walking on a level surface, or up a slight hill, or at night? No   5.  Do you currently have a cold, bronchitis or other respiratory infection? No   6.  Do you have a cough, shortness of breath, or wheezing? No   7.  Do you sometimes get pains in the calves of your legs when you walk? YES - intermittent LE swelling with activity   8. Do you or anyone in your family have previous history of blood clots? No   9.  Do you or does anyone in your family have a serious bleeding problem such as prolonged bleeding following surgeries or cuts? No   10. Have you ever had problems with anemia or been told to take iron pills? No   11. Have you had any abnormal blood loss such as black, tarry or bloody stools? No   12. Have you ever had a blood transfusion? No   13. Have you or any of your relatives ever had  problems with anesthesia? No   14. Do you have sleep apnea, excessive snoring or daytime drowsiness? Yes, daytime drowsiness   15. Do you have any prosthetic heart valves? No   16. Do you have prosthetic joints? No           HPI:                                                      Brief HPI related to upcoming procedure: cervical spinal stenosis and neck pain requiring surgery. Had a fall approximately 4-5 weeks ago with injury to his neck.       See problem list for Extensive active medical problems.  Problems all longstanding and stable, except as noted/documented.  See ROS for pertinent symptoms related to these conditions.                                                                                                  .    MEDICAL HISTORY:                                                      Patient Active Problem List    Diagnosis Date Noted     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)      Priority: Medium     mild EF ~45% on rest 2/13/17, improves with stressing       Severe malnutrition (H)      Priority: Medium     TPN       Short gut syndrome      Priority: Medium     Anxiety      Priority: Medium     ADHD (attention deficit hyperactivity disorder)      Priority: Medium     Cervical radiculopathy 01/17/2017     Priority: Medium     Gastrostomy tube in place (H) 08/09/2016     Priority: Medium     Chronic nausea 05/10/2016     Priority: Medium     Fungemia 04/11/2016     Priority: Medium     Positive blood culture 03/29/2016     Priority: Medium     Insomnia 08/13/2015     Priority: Medium     Coagulase negative staph bacteremia associated with intravascular line (H) 07/30/2015     Priority: Medium     Chronic pain 07/07/2015     Priority: Medium     Patient is followed by Dr. Milligan for ongoing prescription of pain medication.  All refills should be approved by this provider, or covering partner.    Medication(s): Fentanyl 50 mcg patches every three days/ Liquid oxycodone 5 mg/5ml up to 50 mg daily.    Maximum quantity per month: as per Dr. Milligan through [facility]  Clinic visit frequency required: Q 3 months at Pain Management    Controlled substance agreement on file: Yes       Date(s): Through Pain Management    Pain Clinic evaluation in the past: Yes       Date(s):  Ongoing every three months       Location(s):  Per pain management    DIRE Total Score(s):  No flowsheet data found.    Last [facility] website verification:  Through Pain Management   [email or web address]    Esteban Daly MD             Health Care Home 02/24/2015     Priority: Medium     Status:  Accepted  Care Coordinator:  Melissa Behl BSN, RN, PHN  [facility] Care Coordinator  [phone]     See Letters for [facility] Care Plan  Date:  April 4, 2016            Iron deficiency 05/23/2014     Priority: Medium     Vitamin D deficiency 05/22/2014     Priority: Medium     Former smoker 02/24/2014     Priority: Medium     Bile reflux esophagitis 10/16/2013     Priority: Medium     Constipation 10/01/2013     Priority: Medium     Chronic anxiety 09/25/2013     Priority: Medium     Dysphagia 09/17/2013     Priority: Medium     Weight loss, non-intentional 09/17/2013     Priority: Medium     Malnutrition (H) 09/17/2013     Priority: Medium     Dehydration 08/28/2013     Priority: Medium     Vitamin B12 deficiency without anemia 07/31/2013     Priority: Medium     Diagnosis updated by automated process. Provider to review and confirm.       Thiamine deficiency 07/31/2013     Priority: Medium     ADHD (attention deficit hyperactivity disorder), inattentive type 07/02/2013     Priority: Medium     Anemia 09/20/2012     Priority: Medium     Vomiting 06/28/2012     Priority: Medium     Ulcer (H) 06/22/2012     Priority: Medium     Chronic abdominal pain 06/01/2012     Priority: Medium     Patient is followed by ALEXANDRIA WHITLEY for ongoing prescription of narcotic pain medicine.  Med: liquid oxycodone up to 60 mg per day.   Maximum use  per month:   Expected duration: ongoing, hope per surgery that will resolve with upcoming surgery  Narcotic agreement on file: YES  Clinic visit recommended: Q 3 months         CARDIOVASCULAR SCREENING; LDL GOAL LESS THAN 160 10/31/2010     Priority: Medium     Peptic ulcer disease 04/08/2010     Priority: Medium     Gastric bypass status for obesity 04/08/2010     Priority: Medium     Top weight of 492.        Past Medical History   Diagnosis Date     ADHD (attention deficit hyperactivity disorder)      Anxiety      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)      mild EF ~45% on rest 2/13/17, improves with stressing     Chronic abdominal pain      Difficulty swallowing      Gastric ulcer, unspecified as acute or chronic, without mention of hemorrhage, perforation, or obstruction      Gastro-oesophageal reflux disease      Head injury      Hiatal hernia      Other bladder disorder      Other chronic pain      Severe malnutrition (H)      TPN     Short gut syndrome      Tobacco abuse      Past Surgical History   Procedure Laterality Date     Hernia repair  2006     Umbilical hernia     Endoscopy  03/25/11     EGD, [place] [facility]     Endoscopy  08/04/09     Upper Endoscopy, [place] [facility]     Endoscopy  01/05/09     Upper Endoscopy, [place] [facility]     Herniorrhaphy hiatal  6/22/2012     Procedure: HERNIORRHAPHY HIATAL;;  Surgeon: Francis Vyas MD;  Location: [facility]     Celestino en y bowel  2003     Cholecystectomy       Gastrojejunostomy  08/26/09     Extensice enterolysis, partial resect. jejunum, part. resect gastric pouch, gastrojejunostomy anastomosis     Gastrectomy  6/22/2012     Procedure: GASTRECTOMY;  Open Approach, Excise Ulcers,Partial Gastrectomy, Esophagojejunostomy, Hiatal Hernia Repair, Extensive Lysis of Adhesions and Esaphagogastrodudenoscopy.;  Surgeon: Francis Vyas MD;  Location: [facility] OR     Tonsillectomy       C gastric bypass,obese<100cm celestino-en-y  2002     lost 300 pounds      Esophagoscopy, gastroscopy, duodenoscopy (egd), combined  4/20/2011     Procedure:COMBINED ESOPHAGOSCOPY, GASTROSCOPY, DUODENOSCOPY (EGD); Surgeon:FRANCIS VYAS; Location:[facility]     Endoscopic ultrasound upper gastrointestinal tract (gi)  4/29/2011     Procedure:ENDOSCOPIC ULTRASOUND UPPER GASTROINTESTINAL TRACT (GI); Both Procedures done Conjointly; Surgeon:NEREIDA HOUSER; [facility]     Esophagoscopy, gastroscopy, duodenoscopy (egd), combined  6/15/2011     Procedure:COMBINED ESOPHAGOSCOPY, GASTROSCOPY, DUODENOSCOPY (EGD); Surgeon:FRANCIS VYAS; [facility]     Esophagoscopy, gastroscopy, duodenoscopy (egd), combined  6/12/2013     Procedure: COMBINED ESOPHAGOSCOPY, GASTROSCOPY, DUODENOSCOPY (EGD);;  Surgeon: Francis Vyas MD;  [facility]: [facility]     Hc esoph/gas reflux test w nasal imped electrode  8/5/2013     Procedure: ESOPHAGEAL IMPEDENCE FUNCTION TEST 1 HOUR OR LESS;  Surgeon: Halie Lang MD;  [facility]: [facility]     Endoscopic ultrasound upper gastrointestinal tract (gi)  9/9/2013     Procedure: ENDOSCOPIC ULTRASOUND UPPER GASTROINTESTINAL TRACT (GI);  Endoscopic Ultrasound Guide Gastrostomy Tube Placement  C-arm;  Surgeon: Noe Lizarraga MD;  [facility]: [facility]     Endoscopic insertion tube gastrostomy  9/9/2013     Procedure: ENDOSCOPIC INSERTION TUBE GASTROSTOMY;;  Surgeon: Francis Vyas MD;  [facility]: [facility]     Laparotomy exploratory  11/22/2013     Procedure: LAPAROTOMY EXPLORATORY;  Exploratory Laparotomy, Upper Endoscopy, Left Inguinal Hernia Repair;  Surgeon: Francis Vyas MD;  [facility]     Herniorrhaphy inguinal  11/22/2013     Procedure: HERNIORRHAPHY INGUINAL;;  Surgeon: Francis Vyas MD;  [facility]     Esophagoscopy, gastroscopy, duodenoscopy (egd), combined  11/22/2013     Procedure: COMBINED ESOPHAGOSCOPY, GASTROSCOPY, DUODENOSCOPY (EGD);;  Surgeon: Francis Vyas MD;  [facility]     Esophagoscopy,  gastroscopy, duodenoscopy (egd), combined  4/30/2014     Procedure: COMBINED ESOPHAGOSCOPY, GASTROSCOPY, DUODENOSCOPY (EGD);  Surgeon: Francis Vyas MD;  [facility]     Appendectomy       Soft tissue surgery       Back surgery  11/3/2014     curve in the spine     Biopsy lymph node cervical N/A 2/20/2015     Procedure: BIOPSY LYMPH NODE CERVICAL;  Surgeon: Baron Scanlon MD;  Location: [facility]     Esophagoscopy, gastroscopy, duodenoscopy (egd), combined N/A 2/20/2015     Procedure: COMBINED ESOPHAGOSCOPY, GASTROSCOPY, DUODENOSCOPY (EGD), BIOPSY SINGLE OR MULTIPLE;  Surgeon: Baron Scanlon MD;  Location: [facility]     Soft tissue surgery       Esophagoscopy, gastroscopy, duodenoscopy (egd), combined N/A 9/30/2015     Procedure: COMBINED ESOPHAGOSCOPY, GASTROSCOPY, DUODENOSCOPY (EGD);  Surgeon: Francis Vyas MD;  Location:  GI     Current Outpatient Prescriptions   Medication Sig Dispense Refill     oxyCODONE (ROXICODONE) 5 MG/5ML solution Take 10-15 mLs (10-15 mg) by mouth every 4 hours as needed for moderate to severe pain Max of 45mg per day. Fill on/after 2/8/17 not to start till 2/10/17. 1350 mL 0     fentaNYL (DURAGESIC) 50 mcg/hr 72 hr patch Place 1 patch onto the skin every 48 hours MYLAN BRAND ONLY. Fill on/after 2/23/17 to start on/after 2/25/17 15 patch 0     mupirocin (BACTROBAN) 2 % ointment Apply topically 3 times daily 30 g 0     morphine 0.1% in intrasite topical gel Apply as needed prior to accessing the port site. 100 g 0     lidocaine-prilocaine (EMLA) cream Apply topically as needed for moderate pain 30 g 0     ondansetron (ZOFRAN-ODT) 8 MG ODT tab Take 1 tablet (8 mg) by mouth every 8 hours as needed for nausea 90 tablet 3     vitamin D (ERGOCALCIFEROL) 51049 UNIT capsule Take 1 capsule (50,000 Units) by mouth every 7 days 12 capsule 3     sucralfate (CARAFATE) 1 GM/10ML suspension Take 10 mLs (1 g) by mouth 4 times daily 1200 mL 11     diazepam (VALIUM) 5 MG tablet 5 mg  "each morning and 5 mg at bedtime 60 tablet 2     cyanocobalamin (VITAMIN B12) 1000 MCG/ML injection Inject 1 mL (1,000 mcg) into the muscle every 30 days 1 mL 11     [START ON 3/6/2017] amphetamine-dextroamphetamine (ADDERALL) 20 MG per tablet Take 1 tablet (20 mg) by mouth 2 times daily 60 tablet 0     dextrose 1,000 mL with sodium chloride 76.92 mEq solution Inject 110 mL/hr into the vein continuous 2640 mL 10     nystatin-triamcinolone (MYCOLOG II) cream Apply topically 2 times daily as needed 60 g 5     Needle, Disp, (BD DISP NEEDLES) 27G X 1/2\" MISC 1 Device every 30 days Use for cyanocobalamin injection once q 30 days. 3 each 4     amphetamine-dextroamphetamine (ADDERALL) 20 MG per tablet Take 1 tablet (20 mg) by mouth 2 times daily 60 tablet 0     amphetamine-dextroamphetamine (ADDERALL) 20 MG per tablet Take 1 tablet (20 mg) by mouth 2 times daily 60 tablet 0     polyethylene glycol (MIRALAX) powder Take 17 g (1 capful) by mouth daily as needed 510 g 11     order for DME Equipment being ordered: Bilateral knee high chronic venous insufficiency stockings--  mild-moderate pressures. 4 each 5     [facility]NA[facility]ED PATIENT Contact [facility] for patient specific medication information at [phone] on admission and discharge from the hospital.  Phones are answered 24 hours a day 7 days a week 365 days a year.    Providers - Choose \"CONTINUE HOME MED (no script)\" at discharge if patient treatment with home infusion will continue.       senna-docusate (SENOKOT-S;PERICOLACE) 8.6-50 MG per tablet Take 1-2 tablets by mouth 2 times daily as needed for constipation 120 tablet 11     order for DME Injection Supplies for Vitamin B12: 3cc syringes w/ 27 gauge needles, 1/2 inch length 12 each 0     [DISCONTINUED] NO ACTIVE MEDICATIONS . 0 0     OTC products: None, except as noted above    Allergies   Allergen Reactions     Penicillins Anaphylaxis     Doxycycline Rash     Vancomycin Rash     " "Rash after receiving vancomycin 3/28/16 (red man's?). Tolerated with slower infusion and diphenhydramine premed.      Latex Allergy: NO    Social History   Substance Use Topics     Smoking status: Light Tobacco Smoker     Packs/day: 0.10     Years: 3.00     Types: Cigarettes     Smokeless tobacco: Current User      Comment: I use an e cig every now and than     Alcohol use No      Comment: quit      History   Drug Use No       REVIEW OF SYSTEMS:                                                    Constitutional, neuro, ENT, endocrine, pulmonary, cardiac, gastrointestinal, genitourinary, musculoskeletal, integument and psychiatric systems are negative, except as otherwise noted.  Pt. Has chronic malnutrition. Has TPN for 12 hours during sleep. Has PEG tube for gastric bile expulsion with intermittent bloating. Able to take limited PO- these are all baseline for patient.     EXAM:                                                    /68  Pulse 88  Temp 98.2  F (36.8  C) (Temporal)  Resp 16  Ht 5' 11\" (1.803 m)  Wt 174 lb (78.9 kg)  BMI 24.27 kg/m2    GENERAL APPEARANCE: healthy, alert and no distress   Central line access- right upper ant. Chest.      EYES: EOMI, - PERRL     HENT: ear canals and TM's normal and nose and mouth without ulcers or lesions     NECK: no adenopathy and no adenopathy, surgical scars posteriorly     RESP: lungs clear to auscultation - no rales, rhonchi or wheezes     CV: regular rates and rhythm, normal S1 S2, no S3 or S4 and no murmur, click or rub, no edema noted today     ABDOMEN: s/p bypass and surgical scars, PEG tube, diffusely tender generalized, normal BS, no obvious masses     MS: vertebral tenderness in neck and upper back- with even light touch, extremities normal- no gross deformities noted, no evidence of inflammation in joints, FROM in all extremities.     SKIN: no suspicious lesions or rashes     NEURO: Normal strength and tone, sensory exam grossly normal, mentation intact " and speech normal     PSYCH: mentation appears normal. and affect normal/bright     LYMPHATICS: No axillary, cervical, inguinal, or supraclavicular nodes    DIAGNOSTICS:                                                    Hemoglobin (indicated for history of anemia or procedure with significant blood loss such as tonsillectomy, major intraperitoneal surgery, vascular surgery, major spine surgery, total joint replacement)  Serum Potassium  Serum Creatinine  Ordering stress echo    Recent Labs   Lab Test  01/13/17   1400  12/13/16   1500   10/26/16   0713  10/25/16   1804   07/23/13   1036   04/09/13   0921   HGB  12.3*  13.1*   < >  11.4*   --    < >  10.5*   < >  10.6*   PLT  146*  174   < >  76*   --    < >   --    < >   --    INR   --    --    --   1.24*  1.20*   < >   --    --    --    NA  143  146*   < >  144   --    < >  143   < >   --    POTASSIUM  4.0  3.5   < >  3.1*   --    < >  4.0   < >   --    CR  0.76  0.80   < >  0.64*   --    < >  0.72   < >   --    A1C   --    --    --    --    --    --   4.9   --   5.3    < > = values in this interval not displayed.        IMPRESSION:                                                    Reason for surgery/procedure: cervical stenosis  Diagnosis/reason for consult: pre-op clearance,   S/p gastric bypass with malnutrition and chronic GI dysfunction, physical de-conditioning,  chronic pain    The proposed surgical procedure is considered INTERMEDIATE risk.    REVISED CARDIAC RISK INDEX  The patient has the following serious cardiovascular risks for perioperative complications such as (MI, PE, VFib and 3  AV Block):  Mild cardiomyopathy on stress echo- 2/13/17, EF 45-50%, mild hypokinesis  INTERPRETATION: 1 risks: Class [place]    The patient has the following additional risks for perioperative complications:  High tolerance to opioid analgesics due to chronic pain  Chronic malnutrition      ICD-10-CM    1. Preop general physical exam Z01.818 CBC with platelets and differential      Comprehensive metabolic panel     Triglycerides     Bilirubin direct     Magnesium     Phosphorus     CANCELED: CBC with platelets and differential     CANCELED: Comprehensive metabolic panel     CANCELED: Triglycerides     CANCELED: Bilirubin direct     CANCELED: Magnesium     CANCELED: Phosphorus     CANCELED: EKG 12-lead complete w/read - Clinics   2. Cervical radiculopathy M54.12 CBC with platelets and differential     Comprehensive metabolic panel     CANCELED: CBC with platelets and differential     CANCELED: Comprehensive metabolic panel     CANCELED: EKG 12-lead complete w/read - Clinics   3. High risk medication use Z79.899 CBC with platelets and differential     Comprehensive metabolic panel     Triglycerides     Bilirubin direct     Magnesium     Phosphorus     CANCELED: CBC with platelets and differential     CANCELED: Comprehensive metabolic panel     CANCELED: Triglycerides     CANCELED: Bilirubin direct     CANCELED: Magnesium     CANCELED: Phosphorus     CANCELED: EKG 12-lead complete w/read - Clinics   4. Chronic abdominal pain R10.9 CBC with platelets and differential    G89.29 Comprehensive metabolic panel     Triglycerides     Bilirubin direct     Magnesium     Phosphorus     CANCELED: CBC with platelets and differential     CANCELED: Comprehensive metabolic panel     CANCELED: Triglycerides     CANCELED: Bilirubin direct     CANCELED: Magnesium     CANCELED: Phosphorus   5. Ulcer (H) L98.499    6. Gastric bypass status for obesity Z98.84    7. Malnutrition (H) E46 CBC with platelets and differential     Comprehensive metabolic panel     Triglycerides     Bilirubin direct     Magnesium     Phosphorus     CANCELED: CBC with platelets and differential     CANCELED: Comprehensive metabolic panel     CANCELED: Triglycerides     CANCELED: Bilirubin direct     CANCELED: Magnesium     CANCELED: Phosphorus   8. Physical deconditioning R53.81 ECHO STRESS DOBUTAMINE WITH OPTISON     CANCELED: Echo   Stress Test With Def   9. 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) E63.9     I43    10. Severe malnutrition (H) E43        RECOMMENDATIONS:                                                      --Consult hospital rounder / IM to assist post-op medical management    Cardiovascular Risk  Pre-op stress testing due to low functional output, chronically and intermediate risk surgery.   Has FRANCISCO and unable to perform over 2 METS of activity for any lengthy period.  Stress echo shows mild cardiomyopathy, EF 45-50%, mild hypokenisis    Suggest DVT prophylaxis post-op.       --Patient is to take all scheduled medications on the day of surgery EXCEPT for modifications listed below.    May proceed without further diagnostics. Labs on file.       Signed Electronically by: SAILAJA Lam CNP    Copy of this evaluation report is provided to requesting physician.    [facility] Preop Guidelines

## 2017-02-14 NOTE — TELEPHONE ENCOUNTER
I called patients home, spoke to Wife, Rose, who comes with him to all appointments.    He will be admitted overnight.  Therefore they should figure out what dose of medications he needs before he is discharged.  I am NOT going to provide a predetermined amount.    I will send a staff message to Dr. Campos as well.    Jessica Milligan MD  [facility]

## 2017-02-15 ENCOUNTER — ANESTHESIA EVENT (OUTPATIENT)
Dept: SURGERY | Facility: CLINIC | Age: 47
DRG: 472 | End: 2017-02-15
Payer: MEDICARE

## 2017-02-15 ENCOUNTER — ANESTHESIA (OUTPATIENT)
Dept: SURGERY | Facility: CLINIC | Age: 47
DRG: 472 | End: 2017-02-15
Payer: MEDICARE

## 2017-02-15 ENCOUNTER — HOSPITAL ENCOUNTER (OUTPATIENT)
Dept: GENERAL RADIOLOGY | Facility: CLINIC | Age: 47
DRG: 472 | End: 2017-02-15
Attending: NEUROLOGICAL SURGERY | Admitting: NEUROLOGICAL SURGERY
Payer: MEDICARE

## 2017-02-15 ENCOUNTER — HOSPITAL ENCOUNTER (INPATIENT)
Facility: CLINIC | Age: 47
LOS: 1 days | Discharge: HOME HEALTH | DRG: 472 | End: 2017-02-16
Attending: NEUROLOGICAL SURGERY | Admitting: NEUROLOGICAL SURGERY
Payer: MEDICARE

## 2017-02-15 DIAGNOSIS — K90.829 SHORT GUT SYNDROME: ICD-10-CM

## 2017-02-15 DIAGNOSIS — E43 SEVERE MALNUTRITION (H): Primary | ICD-10-CM

## 2017-02-15 DIAGNOSIS — M48.02 CERVICAL SPINAL STENOSIS: ICD-10-CM

## 2017-02-15 PROBLEM — Z98.1 STATUS POST CERVICAL SPINAL ARTHRODESIS: Status: ACTIVE | Noted: 2017-02-15

## 2017-02-15 PROCEDURE — C1763 CONN TISS, NON-HUMAN: HCPCS | Performed by: NEUROLOGICAL SURGERY

## 2017-02-15 PROCEDURE — 27110028 ZZH OR GENERAL SUPPLY NON-STERILE: Performed by: NEUROLOGICAL SURGERY

## 2017-02-15 PROCEDURE — S0077 INJECTION, CLINDAMYCIN PHOSP: HCPCS | Performed by: PHYSICIAN ASSISTANT

## 2017-02-15 PROCEDURE — 22853 INSJ BIOMECHANICAL DEVICE: CPT | Performed by: NEUROLOGICAL SURGERY

## 2017-02-15 PROCEDURE — 25000132 ZZH RX MED GY IP 250 OP 250 PS 637: Performed by: NEUROLOGICAL SURGERY

## 2017-02-15 PROCEDURE — 22845 INSERT SPINE FIXATION DEVICE: CPT | Mod: AS | Performed by: PHYSICIAN ASSISTANT

## 2017-02-15 PROCEDURE — 36000071 ZZH SURGERY LEVEL 5 W FLUORO 1ST 30 MIN: Performed by: NEUROLOGICAL SURGERY

## 2017-02-15 PROCEDURE — 37000009 ZZH ANESTHESIA TECHNICAL FEE, EACH ADDTL 15 MIN: Performed by: NEUROLOGICAL SURGERY

## 2017-02-15 PROCEDURE — 0RG10A0 FUSION OF CERVICAL VERTEBRAL JOINT WITH INTERBODY FUSION DEVICE, ANTERIOR APPROACH, ANTERIOR COLUMN, OPEN APPROACH: ICD-10-PCS | Performed by: NEUROLOGICAL SURGERY

## 2017-02-15 PROCEDURE — 22845 INSERT SPINE FIXATION DEVICE: CPT | Performed by: NEUROLOGICAL SURGERY

## 2017-02-15 PROCEDURE — 25000125 ZZHC RX 250: Performed by: NURSE ANESTHETIST, CERTIFIED REGISTERED

## 2017-02-15 PROCEDURE — 40000277 XR SURGERY CARM FLUORO LESS THAN 5 MIN W STILLS

## 2017-02-15 PROCEDURE — 25000132 ZZH RX MED GY IP 250 OP 250 PS 637: Performed by: PHYSICIAN ASSISTANT

## 2017-02-15 PROCEDURE — 25000125 ZZHC RX 250: Performed by: PHYSICIAN ASSISTANT

## 2017-02-15 PROCEDURE — 22853 INSJ BIOMECHANICAL DEVICE: CPT | Mod: AS | Performed by: PHYSICIAN ASSISTANT

## 2017-02-15 PROCEDURE — C1713 ANCHOR/SCREW BN/BN,TIS/BN: HCPCS | Performed by: NEUROLOGICAL SURGERY

## 2017-02-15 PROCEDURE — 25800025 ZZH RX 258: Performed by: PHYSICIAN ASSISTANT

## 2017-02-15 PROCEDURE — 27210794 ZZH OR GENERAL SUPPLY STERILE: Performed by: NEUROLOGICAL SURGERY

## 2017-02-15 PROCEDURE — 01N10ZZ RELEASE CERVICAL NERVE, OPEN APPROACH: ICD-10-PCS | Performed by: NEUROLOGICAL SURGERY

## 2017-02-15 PROCEDURE — 22551 ARTHRD ANT NTRBDY CERVICAL: CPT | Mod: AS | Performed by: PHYSICIAN ASSISTANT

## 2017-02-15 PROCEDURE — 25000128 H RX IP 250 OP 636: Performed by: PHYSICIAN ASSISTANT

## 2017-02-15 PROCEDURE — 71000015 ZZH RECOVERY PHASE 1 LEVEL 2 EA ADDTL HR: Performed by: NEUROLOGICAL SURGERY

## 2017-02-15 PROCEDURE — 27210995 ZZH RX 272: Performed by: NEUROLOGICAL SURGERY

## 2017-02-15 PROCEDURE — 71000014 ZZH RECOVERY PHASE 1 LEVEL 2 FIRST HR: Performed by: NEUROLOGICAL SURGERY

## 2017-02-15 PROCEDURE — 37000008 ZZH ANESTHESIA TECHNICAL FEE, 1ST 30 MIN: Performed by: NEUROLOGICAL SURGERY

## 2017-02-15 PROCEDURE — 25800025 ZZH RX 258: Performed by: NURSE ANESTHETIST, CERTIFIED REGISTERED

## 2017-02-15 PROCEDURE — 25000125 ZZHC RX 250: Performed by: NEUROLOGICAL SURGERY

## 2017-02-15 PROCEDURE — 22551 ARTHRD ANT NTRBDY CERVICAL: CPT | Performed by: NEUROLOGICAL SURGERY

## 2017-02-15 PROCEDURE — C1894 INTRO/SHEATH, NON-LASER: HCPCS | Performed by: NEUROLOGICAL SURGERY

## 2017-02-15 PROCEDURE — 25000128 H RX IP 250 OP 636: Performed by: NURSE ANESTHETIST, CERTIFIED REGISTERED

## 2017-02-15 PROCEDURE — 0RT30ZZ RESECTION OF CERVICAL VERTEBRAL DISC, OPEN APPROACH: ICD-10-PCS | Performed by: NEUROLOGICAL SURGERY

## 2017-02-15 PROCEDURE — 40000305 ZZH STATISTIC PRE PROC ASSESS I: Performed by: NEUROLOGICAL SURGERY

## 2017-02-15 PROCEDURE — 12000000 ZZH R&B MED SURG/OB

## 2017-02-15 PROCEDURE — 25000128 H RX IP 250 OP 636: Performed by: NEUROLOGICAL SURGERY

## 2017-02-15 PROCEDURE — 36000069 ZZH SURGERY LEVEL 5 EA 15 ADDTL MIN: Performed by: NEUROLOGICAL SURGERY

## 2017-02-15 RX ORDER — CLINDAMYCIN PHOSPHATE 900 MG/50ML
900 INJECTION, SOLUTION INTRAVENOUS SEE ADMIN INSTRUCTIONS
Status: DISCONTINUED | OUTPATIENT
Start: 2017-02-15 | End: 2017-02-15

## 2017-02-15 RX ORDER — MEPERIDINE HYDROCHLORIDE 50 MG/ML
12.5 INJECTION INTRAMUSCULAR; INTRAVENOUS; SUBCUTANEOUS
Status: DISCONTINUED | OUTPATIENT
Start: 2017-02-15 | End: 2017-02-15

## 2017-02-15 RX ORDER — NALOXONE HYDROCHLORIDE 0.4 MG/ML
.1-.4 INJECTION, SOLUTION INTRAMUSCULAR; INTRAVENOUS; SUBCUTANEOUS
Status: DISCONTINUED | OUTPATIENT
Start: 2017-02-15 | End: 2017-02-15

## 2017-02-15 RX ORDER — CALCIUM CARBONATE 500 MG/1
500-1000 TABLET, CHEWABLE ORAL 4 TIMES DAILY PRN
Status: DISCONTINUED | OUTPATIENT
Start: 2017-02-15 | End: 2017-02-16 | Stop reason: HOSPADM

## 2017-02-15 RX ORDER — HYDROMORPHONE HYDROCHLORIDE 1 MG/ML
.5-1 INJECTION, SOLUTION INTRAMUSCULAR; INTRAVENOUS; SUBCUTANEOUS
Status: DISCONTINUED | OUTPATIENT
Start: 2017-02-15 | End: 2017-02-16 | Stop reason: HOSPADM

## 2017-02-15 RX ORDER — METOCLOPRAMIDE HYDROCHLORIDE 5 MG/ML
10 INJECTION INTRAMUSCULAR; INTRAVENOUS EVERY 6 HOURS PRN
Status: DISCONTINUED | OUTPATIENT
Start: 2017-02-15 | End: 2017-02-16 | Stop reason: HOSPADM

## 2017-02-15 RX ORDER — FENTANYL CITRATE 50 UG/ML
25-50 INJECTION, SOLUTION INTRAMUSCULAR; INTRAVENOUS
Status: DISCONTINUED | OUTPATIENT
Start: 2017-02-15 | End: 2017-02-15 | Stop reason: HOSPADM

## 2017-02-15 RX ORDER — DEXAMETHASONE SODIUM PHOSPHATE 10 MG/ML
INJECTION, SOLUTION INTRAMUSCULAR; INTRAVENOUS PRN
Status: DISCONTINUED | OUTPATIENT
Start: 2017-02-15 | End: 2017-02-15

## 2017-02-15 RX ORDER — SCOLOPAMINE TRANSDERMAL SYSTEM 1 MG/1
PATCH, EXTENDED RELEASE TRANSDERMAL PRN
Status: DISCONTINUED | OUTPATIENT
Start: 2017-02-15 | End: 2017-02-15

## 2017-02-15 RX ORDER — CLINDAMYCIN PHOSPHATE 900 MG/50ML
900 INJECTION, SOLUTION INTRAVENOUS EVERY 8 HOURS
Status: COMPLETED | OUTPATIENT
Start: 2017-02-15 | End: 2017-02-16

## 2017-02-15 RX ORDER — ONDANSETRON 2 MG/ML
INJECTION INTRAMUSCULAR; INTRAVENOUS PRN
Status: DISCONTINUED | OUTPATIENT
Start: 2017-02-15 | End: 2017-02-15

## 2017-02-15 RX ORDER — DIAZEPAM 5 MG
5 TABLET ORAL EVERY 12 HOURS PRN
Status: DISCONTINUED | OUTPATIENT
Start: 2017-02-15 | End: 2017-02-16 | Stop reason: HOSPADM

## 2017-02-15 RX ORDER — OXYCODONE HYDROCHLORIDE 5 MG/1
5-10 TABLET ORAL
Status: DISCONTINUED | OUTPATIENT
Start: 2017-02-15 | End: 2017-02-15

## 2017-02-15 RX ORDER — DIMENHYDRINATE 50 MG/ML
25 INJECTION, SOLUTION INTRAMUSCULAR; INTRAVENOUS
Status: DISCONTINUED | OUTPATIENT
Start: 2017-02-15 | End: 2017-02-15

## 2017-02-15 RX ORDER — LIDOCAINE HYDROCHLORIDE AND EPINEPHRINE 10; 10 MG/ML; UG/ML
INJECTION, SOLUTION INFILTRATION; PERINEURAL PRN
Status: DISCONTINUED | OUTPATIENT
Start: 2017-02-15 | End: 2017-02-15 | Stop reason: HOSPADM

## 2017-02-15 RX ORDER — ONDANSETRON 2 MG/ML
4 INJECTION INTRAMUSCULAR; INTRAVENOUS EVERY 6 HOURS PRN
Status: DISCONTINUED | OUTPATIENT
Start: 2017-02-15 | End: 2017-02-16 | Stop reason: HOSPADM

## 2017-02-15 RX ORDER — FENTANYL 50 UG/1
50 PATCH TRANSDERMAL
Status: DISCONTINUED | OUTPATIENT
Start: 2017-02-15 | End: 2017-02-16 | Stop reason: CLARIF

## 2017-02-15 RX ORDER — METOCLOPRAMIDE 5 MG/1
10 TABLET ORAL EVERY 6 HOURS PRN
Status: DISCONTINUED | OUTPATIENT
Start: 2017-02-15 | End: 2017-02-16 | Stop reason: HOSPADM

## 2017-02-15 RX ORDER — ONDANSETRON 2 MG/ML
4 INJECTION INTRAMUSCULAR; INTRAVENOUS EVERY 30 MIN PRN
Status: DISCONTINUED | OUTPATIENT
Start: 2017-02-15 | End: 2017-02-15

## 2017-02-15 RX ORDER — OXYCODONE HCL 5 MG/5 ML
10-20 SOLUTION, ORAL ORAL
Status: DISCONTINUED | OUTPATIENT
Start: 2017-02-15 | End: 2017-02-16 | Stop reason: HOSPADM

## 2017-02-15 RX ORDER — SODIUM CHLORIDE, SODIUM LACTATE, POTASSIUM CHLORIDE, CALCIUM CHLORIDE 600; 310; 30; 20 MG/100ML; MG/100ML; MG/100ML; MG/100ML
INJECTION, SOLUTION INTRAVENOUS CONTINUOUS
Status: DISCONTINUED | OUTPATIENT
Start: 2017-02-15 | End: 2017-02-15

## 2017-02-15 RX ORDER — AMOXICILLIN 250 MG
1-2 CAPSULE ORAL 2 TIMES DAILY
Status: DISCONTINUED | OUTPATIENT
Start: 2017-02-15 | End: 2017-02-16 | Stop reason: HOSPADM

## 2017-02-15 RX ORDER — ACETAMINOPHEN 325 MG/1
975 TABLET ORAL EVERY 8 HOURS
Status: DISCONTINUED | OUTPATIENT
Start: 2017-02-15 | End: 2017-02-15

## 2017-02-15 RX ORDER — PROPOFOL 10 MG/ML
INJECTION, EMULSION INTRAVENOUS PRN
Status: DISCONTINUED | OUTPATIENT
Start: 2017-02-15 | End: 2017-02-15

## 2017-02-15 RX ORDER — ACETAMINOPHEN 325 MG/1
650 TABLET ORAL EVERY 4 HOURS PRN
Status: DISCONTINUED | OUTPATIENT
Start: 2017-02-17 | End: 2017-02-15

## 2017-02-15 RX ORDER — LIDOCAINE HYDROCHLORIDE 20 MG/ML
INJECTION, SOLUTION INFILTRATION; PERINEURAL PRN
Status: DISCONTINUED | OUTPATIENT
Start: 2017-02-15 | End: 2017-02-15

## 2017-02-15 RX ORDER — KETAMINE HYDROCHLORIDE 100 MG/ML
INJECTION, SOLUTION INTRAMUSCULAR; INTRAVENOUS PRN
Status: DISCONTINUED | OUTPATIENT
Start: 2017-02-15 | End: 2017-02-15

## 2017-02-15 RX ORDER — ONDANSETRON 4 MG/1
4 TABLET, ORALLY DISINTEGRATING ORAL EVERY 6 HOURS PRN
Status: DISCONTINUED | OUTPATIENT
Start: 2017-02-15 | End: 2017-02-16 | Stop reason: HOSPADM

## 2017-02-15 RX ORDER — LIDOCAINE 40 MG/G
CREAM TOPICAL
Status: DISCONTINUED | OUTPATIENT
Start: 2017-02-15 | End: 2017-02-15

## 2017-02-15 RX ORDER — PROPOFOL 10 MG/ML
INJECTION, EMULSION INTRAVENOUS CONTINUOUS PRN
Status: DISCONTINUED | OUTPATIENT
Start: 2017-02-15 | End: 2017-02-15

## 2017-02-15 RX ORDER — DIPHENHYDRAMINE HCL 25 MG
25 CAPSULE ORAL EVERY 6 HOURS PRN
Status: DISCONTINUED | OUTPATIENT
Start: 2017-02-15 | End: 2017-02-16 | Stop reason: HOSPADM

## 2017-02-15 RX ORDER — NALOXONE HYDROCHLORIDE 0.4 MG/ML
.1-.4 INJECTION, SOLUTION INTRAMUSCULAR; INTRAVENOUS; SUBCUTANEOUS
Status: DISCONTINUED | OUTPATIENT
Start: 2017-02-15 | End: 2017-02-16 | Stop reason: HOSPADM

## 2017-02-15 RX ORDER — DIPHENHYDRAMINE HYDROCHLORIDE 50 MG/ML
25 INJECTION INTRAMUSCULAR; INTRAVENOUS EVERY 6 HOURS PRN
Status: DISCONTINUED | OUTPATIENT
Start: 2017-02-15 | End: 2017-02-16 | Stop reason: HOSPADM

## 2017-02-15 RX ORDER — LIDOCAINE 40 MG/G
CREAM TOPICAL
Status: DISCONTINUED | OUTPATIENT
Start: 2017-02-15 | End: 2017-02-16 | Stop reason: HOSPADM

## 2017-02-15 RX ORDER — ONDANSETRON 4 MG/1
8 TABLET, ORALLY DISINTEGRATING ORAL EVERY 8 HOURS PRN
Status: DISCONTINUED | OUTPATIENT
Start: 2017-02-15 | End: 2017-02-16 | Stop reason: HOSPADM

## 2017-02-15 RX ORDER — ONDANSETRON 4 MG/1
4 TABLET, ORALLY DISINTEGRATING ORAL EVERY 30 MIN PRN
Status: DISCONTINUED | OUTPATIENT
Start: 2017-02-15 | End: 2017-02-15

## 2017-02-15 RX ORDER — OXYCODONE HYDROCHLORIDE 5 MG/1
10-20 TABLET ORAL
Status: DISCONTINUED | OUTPATIENT
Start: 2017-02-15 | End: 2017-02-15

## 2017-02-15 RX ORDER — PROCHLORPERAZINE MALEATE 5 MG
5-10 TABLET ORAL EVERY 6 HOURS PRN
Status: DISCONTINUED | OUTPATIENT
Start: 2017-02-15 | End: 2017-02-16 | Stop reason: HOSPADM

## 2017-02-15 RX ORDER — CLINDAMYCIN PHOSPHATE 900 MG/50ML
900 INJECTION, SOLUTION INTRAVENOUS
Status: COMPLETED | OUTPATIENT
Start: 2017-02-15 | End: 2017-02-15

## 2017-02-15 RX ORDER — FENTANYL CITRATE 50 UG/ML
INJECTION, SOLUTION INTRAMUSCULAR; INTRAVENOUS PRN
Status: DISCONTINUED | OUTPATIENT
Start: 2017-02-15 | End: 2017-02-15

## 2017-02-15 RX ADMIN — KETAMINE HYDROCHLORIDE 50 MG: 100 INJECTION, SOLUTION, CONCENTRATE INTRAMUSCULAR; INTRAVENOUS at 12:47

## 2017-02-15 RX ADMIN — PROPOFOL 200 MCG/KG/MIN: 10 INJECTION, EMULSION INTRAVENOUS at 12:31

## 2017-02-15 RX ADMIN — ROCURONIUM BROMIDE 50 MG: 10 INJECTION INTRAVENOUS at 12:29

## 2017-02-15 RX ADMIN — CLINDAMYCIN PHOSPHATE 900 MG: 18 INJECTION, SOLUTION INTRAVENOUS at 19:57

## 2017-02-15 RX ADMIN — FENTANYL CITRATE 50 MCG: 50 INJECTION, SOLUTION INTRAMUSCULAR; INTRAVENOUS at 12:36

## 2017-02-15 RX ADMIN — HYDROMORPHONE HYDROCHLORIDE 0.5 MG: 1 INJECTION, SOLUTION INTRAMUSCULAR; INTRAVENOUS; SUBCUTANEOUS at 15:39

## 2017-02-15 RX ADMIN — ONDANSETRON 4 MG: 2 INJECTION INTRAMUSCULAR; INTRAVENOUS at 14:04

## 2017-02-15 RX ADMIN — FENTANYL CITRATE 50 MCG: 50 INJECTION, SOLUTION INTRAMUSCULAR; INTRAVENOUS at 14:50

## 2017-02-15 RX ADMIN — ROCURONIUM BROMIDE 10 MG: 10 INJECTION INTRAVENOUS at 12:42

## 2017-02-15 RX ADMIN — SCOPALAMINE 1 PATCH: 1 PATCH, EXTENDED RELEASE TRANSDERMAL at 12:02

## 2017-02-15 RX ADMIN — DIAZEPAM 5 MG: 5 TABLET ORAL at 21:50

## 2017-02-15 RX ADMIN — FENTANYL CITRATE 50 MCG: 50 INJECTION, SOLUTION INTRAMUSCULAR; INTRAVENOUS at 14:36

## 2017-02-15 RX ADMIN — HYDROMORPHONE HYDROCHLORIDE 0.5 MG: 1 INJECTION, SOLUTION INTRAMUSCULAR; INTRAVENOUS; SUBCUTANEOUS at 16:10

## 2017-02-15 RX ADMIN — OXYCODONE HYDROCHLORIDE 20 MG: 5 SOLUTION ORAL at 18:48

## 2017-02-15 RX ADMIN — OXYCODONE HYDROCHLORIDE 20 MG: 5 SOLUTION ORAL at 21:38

## 2017-02-15 RX ADMIN — LIDOCAINE HYDROCHLORIDE 40 MG: 20 INJECTION, SOLUTION INFILTRATION; PERINEURAL at 12:28

## 2017-02-15 RX ADMIN — ONDANSETRON HYDROCHLORIDE 4 MG: 2 SOLUTION INTRAMUSCULAR; INTRAVENOUS at 18:48

## 2017-02-15 RX ADMIN — HYDROMORPHONE HYDROCHLORIDE 0.5 MG: 1 INJECTION, SOLUTION INTRAMUSCULAR; INTRAVENOUS; SUBCUTANEOUS at 17:14

## 2017-02-15 RX ADMIN — ACETAMINOPHEN 975 MG: 160 SUSPENSION ORAL at 18:49

## 2017-02-15 RX ADMIN — Medication 4 MCG: at 14:16

## 2017-02-15 RX ADMIN — DEXAMETHASONE SODIUM PHOSPHATE 4 MG: 10 INJECTION, SOLUTION INTRAMUSCULAR; INTRAVENOUS at 12:38

## 2017-02-15 RX ADMIN — SODIUM CHLORIDE, POTASSIUM CHLORIDE, SODIUM LACTATE AND CALCIUM CHLORIDE: 600; 310; 30; 20 INJECTION, SOLUTION INTRAVENOUS at 11:45

## 2017-02-15 RX ADMIN — Medication 4 MCG: at 14:04

## 2017-02-15 RX ADMIN — RANITIDINE 150 MG: 15 SYRUP ORAL at 21:38

## 2017-02-15 RX ADMIN — FENTANYL CITRATE 50 MCG: 50 INJECTION, SOLUTION INTRAMUSCULAR; INTRAVENOUS at 13:14

## 2017-02-15 RX ADMIN — PROPOFOL 150 MG: 10 INJECTION, EMULSION INTRAVENOUS at 12:28

## 2017-02-15 RX ADMIN — Medication 4 MCG: at 14:10

## 2017-02-15 RX ADMIN — MIDAZOLAM HYDROCHLORIDE 2 MG: 1 INJECTION, SOLUTION INTRAMUSCULAR; INTRAVENOUS at 12:11

## 2017-02-15 RX ADMIN — CLINDAMYCIN PHOSPHATE 900 MG: 18 INJECTION, SOLUTION INTRAVENOUS at 12:16

## 2017-02-15 RX ADMIN — ONDANSETRON HYDROCHLORIDE 4 MG: 2 SOLUTION INTRAMUSCULAR; INTRAVENOUS at 15:35

## 2017-02-15 RX ADMIN — SODIUM CHLORIDE 110 ML/HR: 234 INJECTION INTRAMUSCULAR; INTRAVENOUS; SUBCUTANEOUS at 17:56

## 2017-02-15 RX ADMIN — HYDROMORPHONE HYDROCHLORIDE 0.5 MG: 1 INJECTION, SOLUTION INTRAMUSCULAR; INTRAVENOUS; SUBCUTANEOUS at 15:54

## 2017-02-15 RX ADMIN — FENTANYL CITRATE 50 MCG: 50 INJECTION, SOLUTION INTRAMUSCULAR; INTRAVENOUS at 12:14

## 2017-02-15 RX ADMIN — SODIUM CHLORIDE, POTASSIUM CHLORIDE, SODIUM LACTATE AND CALCIUM CHLORIDE: 600; 310; 30; 20 INJECTION, SOLUTION INTRAVENOUS at 13:45

## 2017-02-15 ASSESSMENT — LIFESTYLE VARIABLES: TOBACCO_USE: 1

## 2017-02-15 ASSESSMENT — PAIN DESCRIPTION - DESCRIPTORS
DESCRIPTORS: ACHING

## 2017-02-15 NOTE — OP NOTE
DATE OF PROCEDURE:  02/15/2017      SURGEON:  Darren Campos MD      ASSISTANT:  Fernandez COLLINS   Note,  Fernandez Mann was present for and assisted with the entire surgery and his role as an assistant was crucial for his aid in positioning, exposure, suctioning, retraction and closure.      PREOPERATIVE DIAGNOSIS:  Cervical stenosis.      POSTOPERATIVE DIAGNOSIS:  Cervical stenosis.      PROCEDURES:   1.  C5-C6 anterior diskectomy and interbody arthrodesis.   2.  Insertion of Waylon Aero-C intervertebral graft at C5-C6.   3.  C5-C6 anterior instrumentation with insertion of titanium anchors via titanium plate.   4.  Use of intraoperative microscope and fluoroscopy.      ESTIMATED BLOOD LOSS:  25 mL.      INDICATIONS:  Mr. Parker Acevedo Sr. is a 46-year-old male with profound neck and left arm pain originating after a fall in December.  MRI demonstrated C5-C6 disk herniation with significant foraminal stenosis.  He developed progressive weakness in the arm and was frequently dropping objects.  He underwent physical therapy and injection which actually worsened his symptoms, thus he wished to undergo surgery.  Risks, benefits, indications and alternatives were discussed with the patient in detail all their questions were answered and he wished to proceed with surgery.      DESCRIPTION OF PROCEDURE:  The patient was positioned supine.  Our sterile prepping and draping procedures were performed.  Antibiotics were administered and timeout was performed.  A left horizontal neck incision was performed with a #15 blade and the monopolar used to divide the platysma.  The Metzenbaum scissors were used to create a plane in the investing fascia medial to the sternocleidomastoid muscle.  Blunt dissection was used to come down upon the anterior cervical spine.  A spinal needle was used to verify the correct level.  The monopolar was used to elevate the longus colli bilaterally.  Anterior osteophytes were drilled and N[place]is pins  were introduced #15 blade was used to perform an annulotomy in the disk space at C5-C6 complete diskectomy was performed with a combination of pituitary rongeurs, the high speed drill, curets and Kerrison rongeurs.  The posterior longitudinal ligament was removed with the Kerrison rongeurs and bilateral foraminal decompression was performed, hemostasis was achieved.  A Waylon Aero-C intervertebral graft was delivered into the disk space at C5-C6.  Anterior instrumentation was performed by inserting titanium anchors via the titanium plate.  X-ray imaging demonstrated excellent positioning of the hardware.  Antibiotic irrigation was performed, hemostasis was achieved.  The platysma and dermal layer were closed with 3-0 Vicryl sutures and the skin was closed with a running subcuticular stitch.  There were no intraprocedural complications.         FABIAN BROCK MD             D: 02/15/2017 14:13   T: 02/15/2017 16:13   MT: [id number]      Name:     SARA HASSAN SR   MRN:      [id number]        Account:        [id number]   :      1970           Procedure Date: 02/15/2017      Document: [id number]

## 2017-02-15 NOTE — OR NURSING
Transfer from  pacu to Room med/surg  Transferred to bed via ambulatory assist (Glyder Mat,Transfer Boar,Slider Sheet)    S: 45 y/o male  S/P cervical fusion       Anesthesia Type:  gen       Surgeon:  Dr. Campos       Allergies:  See Medication Reconciliation Record       DNR: no  (Yes,No)    B:  Pertinent Medical History: see EHR   (CHF; Heart Disease; Lung Disease; Chronic Pain; Diabetes; Other (Comment)          Surgical History:  See EHR    A:  EBL: 20        IVF:  1800        UOP:  Voided x1        NPO:  ___Yes _x__No         Vomiting:  ___Yes _x_No         Drainage: none        Skin Integrity: incisions (Normal; Pressure Ulcer (Location)        RFO: ___Yes_x_No (identify item if present)        SSI Patient?  ___Yes__x_No (if yes, see checklist for actions)        Brace/sling/equipment:  _x__Yes___No (identify item if present) c-collar         See PACU record for ongoing assessment, vital signs and pain assessment.    R: Post-Op vitals and assessments as ordered/indicated per patient's condition.       Follow Post-Op orders and notify Physician prn.       Continue to involve patient/family in plan of care and discharge planning.       Reinforce Pre-Operative education.       Implement skin safety interventions as appropriate.    Name: Josué Ambrocio to Cristin HARRISON

## 2017-02-15 NOTE — ANESTHESIA PREPROCEDURE EVALUATION
Anesthesia Evaluation     . Pt has had prior anesthetic. Type: General and MAC    History of anesthetic complications  - PONV    ROS/MED HX    ENT/Pulmonary:     (+)tobacco use (e cigs), Current use , . .    Neurologic:     (+)other neuro ADHD    Cardiovascular: Comment: Pt had higher bp and SVT runs when heavier.  None now since gastric bypass    (+) ----. : . . . :. . congenital heart disease Mild Cardiomyopathy - EF 45%:, Previous cardiac testing Echodate:2017results:Adeline Lester  Results   ECHO STRESS DOBUTAMINE WITH OPTISON (Order [id number])   External Result Report   External Result Report  PACS Images   Show images for ECHO STRESS DOBUTAMINE WITH OPTISON  ECHO STRESS DOBUTAMINE WITH OPTISON   Order: [id number]   Status: Edited Result - FINAL   Visible to patient: Yes (MyChart) Next appt: Today at 12:00 PM in Radiology. ([facility]) Dx: Physical deconditioning   Notes Recorded by Adeline Lester, APRN CNP on 2017 at 4:49 PM  Mild cardiomyopathy, with good contractile reserve, no inducible ischemia.  There is mild global hypokinesia of the left ventricle.  The visual ejection fraction is estimated at 45-50%. Traced at 45%. This is  unchanged from prior study.    LM with wife with above results.   Adeline Lester  Details     Reading Physician Reading Date Result Priority  Matt Ndiaye MD 2017   Narrative        [id number]  [id number]  [id number]  [id number]^CHANDAN^ADELINE^MAKENZIE           [facility]  Echocardiography Laboratory  [address] [place] [address]        Name: SARA HASSAN SR  MRN: [id number]  : 1970  Study Date: 2017 02:57 PM  Age: 46 yrs  Gender: Male  Patient Location: [facility]  Reason For Study: malaise; pre-op  Ordering Physician: ADELINE LESTER  Referring Physician: ADELINE LESTER  Performed By: Miri Degroot [facility]     BSA: 2.0 m2  Height: 71 in  Weight: 174 lb  HR: 69  BP: 128/77  mmHg  _____________________________________________________________________________  __     _____________________________________________________________________________  __        Interpretation Summary     Mild cardiomyopathy, with good contractile reserve, no inducible ischemia.  There is mild global hypokinesia of the left ventricle.  The visual ejection fraction is estimated at 45-50%. Traced at 45%. This is  unchanged from prior study.  Target heart rate was achieved. Heart rate and blood pressure response to  dobutamine were normal. With stress, the left ventricular ejection fraction  increased from 45-50% to greater than 65% and the left ventricular size  decreased appropriately.  No subjective symptoms to suggest ischemia.  There was no ECG evidence of ischemia.  No significant valve disease on screening doppler evaluation. The aortic root  and visualized ascending aorta are normal.     _____________________________________________________________________________  __     Stress  The drug infusion was stopped due to target heart rate achieved.  The patient did not exhibit any symptoms during drug infusion.  The maximum dose of dobutamine was 50mcg/kg/min.  The maximum dose of metoprolol was 1.0mg.  Optison (NDC #9470-2060-44) given intravenously.  Patient was given 7.0 ml mixture of 3 ml Optison and 6 ml saline.  2.0 ml wasted.  Powerport was used.  This was a normal stress EKG with no evidence of stress-induced ischemia.     Baseline  Normal baseline electrocardiogram.     Stress Results     Protocol:  Dobutamine Stress Echo        Maximum Predicted HR:   176 bpm     Target HR: 150 bpm                       % Maximum Predicted HR: 85 %                           StageDurationHeart Rate  BP  Dose                             (mm:ss)   (bpm)                        RESt   0:00      69    128/77                        PEAK  10:43     150    134/7250.00                           Stress Duration:   10:43 mm:ss  *                     Maximum Stress HR: 150 bpm *     Left Ventricle  The visual ejection fraction is estimated at 45-50%. There is mild global  hypokinesia of the left ventricle.     Right Ventricle  The right ventricle is normal in size and function.     Mitral Valve  The mitral valve is normal in structure and function.        Tricuspid Valve  The tricuspid valve is normal in structure and function. RVSP = 15.0 mmHg +  RAP.     Aortic Valve  The aortic valve is normal in structure and function.     Pulmonic Valve  The pulmonic valve is normal in structure and function.     Pericardium  There is no pericardial effusion.     _____________________________________________________________________________  __  MMode/2D Measurements & Calculations  asc Aorta Diam: 3.5 cm        Doppler Measurements & Calculations  MV E max greta: 55.5 cm/sec  MV A max greta: 38.6 cm/sec  MV E/A: 1.4  MV dec time: 0.15 sec  TR max greta: 193.7 cm/sec  TR max PG: 15.0 mmHg              _____________________________________________________________________________  __        Report approved by: Sudhir Sheppard 02/13/2017 04:38 PM           date: results:ECG reviewed date:8/2015 results:SR date: results:          METS/Exercise Tolerance:     Hematologic:     (+) Anemia (iron deficiency), -      Musculoskeletal: Comment: Left knee sprain now and scoliosis in upper back  (+) , , other musculoskeletal- DDD cervical C5-6      GI/Hepatic: Comment: Gastric bypass, colectomy for ulcerative colitis and diverticulitits.  Has peg tube with chronic zofran use for nausea    (+) GERD (Has bile reflexing up intermittently from all the abdominal surgeries) Symptomatic, hiatal hernia, Inflammatory bowel disease, Other GI/Hepatic Short gut syndrome- malnutrition - on TPN at night.  Pt has G-tube      Renal/Genitourinary:  - ROS Renal section negative       Endo:  - neg endo ROS       Psychiatric:     (+) psychiatric history (ADHD) anxiety      Infectious  Disease:  - neg infectious disease ROS       Malignancy:      - no malignancy   Other:    (+) H/O Chronic Pain,             Physical Exam  Normal systems: cardiovascular and pulmonary    Airway   Mallampati: II  TM distance: >3 FB  Neck ROM: full    Dental   (+) missing  Comment: No teeth    Cardiovascular   Rhythm and rate: regular and normal      Pulmonary    breath sounds clear to auscultationRhonchi: pt getting over cold.                       Anesthesia Plan      History & Physical Review  History and physical reviewed and following examination; no interval change.    ASA Status:  2 .        Plan for General and ETT with Intravenous and Propofol induction. Maintenance will be Balanced.    PONV prophylaxis:  Ondansetron (or other 5HT-3) and Dexamethasone or Solumedrol       Postoperative Care  Postoperative pain management:  IV analgesics and Oral pain medications.      Consents  Anesthetic plan, risks, benefits and alternatives discussed with:  Patient or representative..                            .    Anesthesia Plan      History & Physical Review  History and physical reviewed and following examination; no interval change.    ASA Status:  2 .        Plan for General and ETT with Intravenous and Propofol induction. Maintenance will be Balanced.    PONV prophylaxis:  Ondansetron (or other 5HT-3) and Dexamethasone or Solumedrol       Postoperative Care  Postoperative pain management:  IV analgesics and Oral pain medications.      Consents  Anesthetic plan, risks, benefits and alternatives discussed with:  Patient or representative..                              Anesthesia Plan    ASA Scores 2 .    Plan for General and ETT - with Propofol and Intravenous induction.Maintenance will be TIVA.   Routine analgesia and antiemetics to be used for post-operative care. Anesthetic plan, risks, benefits and alternatives discussed with: patient or representative.                 History & Physical Review  History and  physical reviewed; no interval change.                        ANESTHESIA PREOP EVALUATION    Procedure: Procedure(s):  DISCECTOMY, FUSION CERVICAL ANTERIOR ONE LEVEL CERVICAL 5-6 - Wound Class:     HPI:      Patient history and physical exam reviewed.    PMHx/PSHx/ROS:  Past Medical History   Diagnosis Date     ADHD (attention deficit hyperactivity disorder)      Anxiety      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)      mild EF ~45% on rest 2/13/17, improves with stressing     Chronic abdominal pain      Difficulty swallowing      Gastric ulcer, unspecified as acute or chronic, without mention of hemorrhage, perforation, or obstruction      Gastro-oesophageal reflux disease      Head injury      Hiatal hernia      Other bladder disorder      Other chronic pain      Severe malnutrition (H)      TPN     Short gut syndrome      Tobacco abuse        Past Surgical History   Procedure Laterality Date     Hernia repair  2006     Umbilical hernia     Endoscopy  03/25/11     EGD, [facility]     Endoscopy  08/04/09     Upper Endoscopy, [facility]     Endoscopy  01/05/09     Upper Endoscopy, [facility]     Herniorrhaphy hiatal  6/22/2012     Procedure: HERNIORRHAPHY HIATAL;;  Surgeon: Francis Vyas MD;  Location: [facility] OR     Celestino en y bowel  2003     Cholecystectomy       Gastrojejunostomy  08/26/09     Extensice enterolysis, partial resect. jejunum, part. resect gastric pouch, gastrojejunostomy anastomosis     Gastrectomy  6/22/2012     Procedure: GASTRECTOMY;  Open Approach, Excise Ulcers,Partial Gastrectomy, Esophagojejunostomy, Hiatal Hernia Repair, Extensive Lysis of Adhesions and Esaphagogastrodudenoscopy.;  Surgeon: Francis Vyas MD;  Location: [facility] OR     Tonsillectomy       C gastric bypass,obese<100cm celestino-en-y  2002     lost 300 pounds     Esophagoscopy, gastroscopy, duodenoscopy (egd), combined  4/20/2011     Procedure:COMBINED ESOPHAGOSCOPY, GASTROSCOPY, DUODENOSCOPY  (EGD); Surgeon:FRANCIS VYAS; Location:[facility]     Endoscopic ultrasound upper gastrointestinal tract (gi)  4/29/2011     Procedure:ENDOSCOPIC ULTRASOUND UPPER GASTROINTESTINAL TRACT (GI); Both Procedures done Conjointly; Surgeon:NEREIDA HOUSER; Location[facility]     Esophagoscopy, gastroscopy, duodenoscopy (egd), combined  6/15/2011     Procedure:COMBINED ESOPHAGOSCOPY, GASTROSCOPY, DUODENOSCOPY (EGD); Surgeon:FRANCIS VYAS; [facility]     Esophagoscopy, gastroscopy, duodenoscopy (egd), combined  6/12/2013     Procedure: COMBINED ESOPHAGOSCOPY, GASTROSCOPY, DUODENOSCOPY (EGD);;  Surgeon: Francis Vyas MD;  Location: [facility]     Hc esoph/gas reflux test w nasal imped electrode  8/5/2013     Procedure: ESOPHAGEAL IMPEDENCE FUNCTION TEST 1 HOUR OR LESS;  Surgeon: Halie Lang MD;  Location: [facility]     Endoscopic ultrasound upper gastrointestinal tract (gi)  9/9/2013     Procedure: ENDOSCOPIC ULTRASOUND UPPER GASTROINTESTINAL TRACT (GI);  Endoscopic Ultrasound Guide Gastrostomy Tube Placement  C-arm;  Surgeon: Noe Lizarraga MD;  Location: [facility] OR     Endoscopic insertion tube gastrostomy  9/9/2013     Procedure: ENDOSCOPIC INSERTION TUBE GASTROSTOMY;;  Surgeon: Francis Vyas MD;  [facility]: [facility]     Laparotomy exploratory  11/22/2013     Procedure: LAPAROTOMY EXPLORATORY;  Exploratory Laparotomy, Upper Endoscopy, Left Inguinal Hernia Repair;  Surgeon: Francis Vyas MD;  Location: [facility]     Herniorrhaphy inguinal  11/22/2013     Procedure: HERNIORRHAPHY INGUINAL;;  Surgeon: Francis Vyas MD;  [facility]     Esophagoscopy, gastroscopy, duodenoscopy (egd), combined  11/22/2013     Procedure: COMBINED ESOPHAGOSCOPY, GASTROSCOPY, DUODENOSCOPY (EGD);;  Surgeon: Francis Vyas MD;  [facility]     Esophagoscopy, gastroscopy, duodenoscopy (egd), combined  4/30/2014     Procedure: COMBINED ESOPHAGOSCOPY, GASTROSCOPY, DUODENOSCOPY (EGD);  Surgeon: Lilliam  "Francis Castaonn MD;  [facility]     Appendectomy       Soft tissue surgery       Back surgery  11/3/2014     curve in the spine     Biopsy lymph node cervical N/A 2/20/2015     Procedure: BIOPSY LYMPH NODE CERVICAL;  Surgeon: Baron Scanlon MD;  Location: [facility]     Esophagoscopy, gastroscopy, duodenoscopy (egd), combined N/A 2/20/2015     Procedure: COMBINED ESOPHAGOSCOPY, GASTROSCOPY, DUODENOSCOPY (EGD), BIOPSY SINGLE OR MULTIPLE;  Surgeon: Baron Scanlon MD;  Location: [facility]     Soft tissue surgery       Esophagoscopy, gastroscopy, duodenoscopy (egd), combined N/A 9/30/2015     Procedure: COMBINED ESOPHAGOSCOPY, GASTROSCOPY, DUODENOSCOPY (EGD);  Surgeon: Francis Vyas MD;  [facility]           Past Anes Hx: No personal or family h/o anesthesia problems    Soc Hx:   Tobacco:   EtOH:     Allergies:   Allergies   Allergen Reactions     Penicillins Anaphylaxis     Doxycycline Rash     Vancomycin Rash     Rash after receiving vancomycin 3/28/16 (red man's?). Tolerated with slower infusion and diphenhydramine premed.       Meds:     (Not in a hospital admission)    Current Outpatient Prescriptions   Medication Sig Dispense Refill     oxyCODONE (ROXICODONE) 5 MG/5ML solution Take 10-15 mLs (10-15 mg) by mouth every 4 hours as needed for moderate to severe pain Max of 45mg per day. Fill on/after 2/8/17 not to start till 2/10/17. 1350 mL 0     fentaNYL (DURAGESIC) 50 mcg/hr 72 hr patch Place 1 patch onto the skin every 48 hours MYLAN BRAND ONLY. Fill on/after 2/23/17 to start on/after 2/25/17 15 patch 0     mupirocin (BACTROBAN) 2 % ointment Apply topically 3 times daily 30 g 0     morphine 0.1% in intrasite topical gel Apply as needed prior to accessing the port site. 100 g 0     lidocaine-prilocaine (EMLA) cream Apply topically as needed for moderate pain 30 g 0     Needle, Disp, (BD DISP NEEDLES) 27G X 1/2\" MISC 1 Device every 30 days Use for cyanocobalamin injection once q 30 days. 3 each 4 " "    ondansetron (ZOFRAN-ODT) 8 MG ODT tab Take 1 tablet (8 mg) by mouth every 8 hours as needed for nausea 90 tablet 3     vitamin D (ERGOCALCIFEROL) 35914 UNIT capsule Take 1 capsule (50,000 Units) by mouth every 7 days 12 capsule 3     sucralfate (CARAFATE) 1 GM/10ML suspension Take 10 mLs (1 g) by mouth 4 times daily 1200 mL 11     diazepam (VALIUM) 5 MG tablet 5 mg each morning and 5 mg at bedtime 60 tablet 2     cyanocobalamin (VITAMIN B12) 1000 MCG/ML injection Inject 1 mL (1,000 mcg) into the muscle every 30 days 1 mL 11     [START ON 3/6/2017] amphetamine-dextroamphetamine (ADDERALL) 20 MG per tablet Take 1 tablet (20 mg) by mouth 2 times daily 60 tablet 0     amphetamine-dextroamphetamine (ADDERALL) 20 MG per tablet Take 1 tablet (20 mg) by mouth 2 times daily 60 tablet 0     amphetamine-dextroamphetamine (ADDERALL) 20 MG per tablet Take 1 tablet (20 mg) by mouth 2 times daily 60 tablet 0     dextrose 1,000 mL with sodium chloride 76.92 mEq solution Inject 110 mL/hr into the vein continuous 2640 mL 10     polyethylene glycol (MIRALAX) powder Take 17 g (1 capful) by mouth daily as needed 510 g 11     nystatin-triamcinolone (MYCOLOG II) cream Apply topically 2 times daily as needed 60 g 5     order for DME Equipment being ordered: Bilateral knee high chronic venous insufficiency stockings--  mild-moderate pressures. 4 each 5     [facility]NA[facility]ED PATIENT Contact [facility] for patient specific medication information at [phone] on admission and discharge from the hospital.  Phones are answered 24 hours a day 7 days a week 365 days a year.    Providers - Choose \"CONTINUE HOME MED (no script)\" at discharge if patient treatment with home infusion will continue.       senna-docusate (SENOKOT-S;PERICOLACE) 8.6-50 MG per tablet Take 1-2 tablets by mouth 2 times daily as needed for constipation 120 tablet 11     order for DME Injection Supplies for Vitamin B12: 3cc syringes w/ 27 gauge " needles, 1/2 inch length 12 each 0     [DISCONTINUED] NO ACTIVE MEDICATIONS . 0 0       Physical Exam:  VS: T Data Unavailable, P Data Unavailable, BP Data Unavailable, R Data Unavailable, SpO2  .    Wt Readings from Last 2 Encounters:   02/13/17 89.4 kg (197 lb)   02/09/17 78.9 kg (174 lb)       Airway: Mal 2 Feasible  Dentition:  Normal  Heart:  Normal  Lungs:  Normal      NPO Status:  greater than 6 hours      Labs:  UPT:   No results found for this basename: HCGQUANT       BMP:  Recent Labs   Lab Test 11/22/13 1020 09/19/13 1238    NA -- 145*    POTASSIUM 4.1 --    CHLORIDE -- 104    CO2 -- 31    BUN -- 11    CR -- 0.84    GLC -- 91    SILAS -- 9.0     LFTs:   Recent Labs   Lab Test 09/19/13 1238    PROTTOTAL 7.2    ALBUMIN 3.9    BILITOTAL 0.2    ALKPHOS 65    AST 27    ALT 36    BILIDIRECT --     CBC:   Recent Labs   Lab Test 11/22/13 1020 09/13/13 0136    WBC -- 6.5    RBC -- 4.39*    HGB 12.3* --    HCT -- 37.7*    MCV -- 86    MCH -- 26.9    MCHC -- 31.3*    RDW -- 18.9*    PLT -- 185     Coags:  Recent Labs   Lab Test 09/09/13 1013 06/28/12 1058    INR 1.08 --    PTT -- 34    FIBR -- --           Assessment/Plan:  - ASA 2  - GETA with standard ASA monitors, IV induction, balanced anesthetic  - PIV   - Antibiotics  per surgery  - PONV prophylaxis  - Blood products available, possible administration discussed with patient   - Relevant risks, benefits, alternatives and the anesthetic plan were discussed with patient/family or family representative.  All questions were answered and there was agreement to proceed.      Chuy Murphy MD    11/22/2013  10:45 AM

## 2017-02-15 NOTE — ANESTHESIA CARE TRANSFER NOTE
Patient: Parker Acevedo Sr    Procedure(s):  DISCECTOMY, FUSION CERVICAL ANTERIOR ONE LEVEL CERVICAL 5-6 - Wound Class: I-Clean    Diagnosis: cervical stenosis  Diagnosis Additional Information: No value filed.    Anesthesia Type:   General, ETT     Note:  Airway :Face Mask  Patient transferred to:PACU        Vitals: (Last set prior to Anesthesia Care Transfer)    CRNA VITALS  2/15/2017 1408 - 2/15/2017 1452      2/15/2017             Pulse: 86    SpO2: 97 %                Electronically Signed By: SAILAJA Omalley CRNA  February 15, 2017  2:52 PM

## 2017-02-15 NOTE — IP AVS SNAPSHOT
MRN:[id number]                      After Visit Summary   2/15/2017    Parker Acevedo     MRN: [id number]           Thank you!     Thank you for choosing [facility] for your care. Our goal is always to provide you with excellent care. Hearing back from our patients is one way we can continue to improve our services. Please take a few minutes to complete the written survey that you may receive in the mail after you visit with us. Thank you!        Patient Information     Date Of Birth          1970        About your hospital stay     You were admitted on:  February 15, 2017 You last received care in the:  [address] [facility]    You were discharged on:  February 16, 2017        Reason for your hospital stay       Neck surgery                  Who to Call     For medical emergencies, please call 911.  For non-urgent questions about your medical care, please call your primary care provider or clinic, [phone]  For questions related to your surgery, please call your surgery clinic        Attending Provider     Provider Specialty    Darren Campos MD Neurosurgery       Primary Care Provider Office Phone # Fax #    Esteban Paul Daly [phone]       [facility] [id number] [place] [address]        After Care Instructions     Activity       Discharge instructions:  No lifting of more than 10 pounds until follow up visit.  Ok to shampoo hair, shower or bathe, but, do not scrub or submerge incision under water until evaluated post operatively in clinic.    Ok to walk as tolerated, avoid bedrest.    No contact sports until after follow up visit  No high impact activities such as; running/jogging, snowmobile or 4 thompson riding or any other recreational vehicles    Call my office at [phone] for increasing redness, swelling or pus draining from the incision, increased pain or any other questions and concerns.    Go to ER with  any seizure activity, mental status change (increasing confusion), difficulty with speech or increasing or acute weakness            Diet       Follow this diet upon discharge: Resume usual diet            Discharge Instructions       Discharge instructions:  No lifting of more than 10 pounds until follow up visit.  Ok to shampoo hair, shower or bathe, but, do not scrub or submerge incision under water until evaluated post operatively in clinic.    Ok to walk as tolerated, avoid bedrest.    No contact sports until after follow up visit  No high impact activities such as; running/jogging, snowmobile or 4 thompson riding or any other recreational vehicles    Call my office at [phone] for increasing redness, swelling or pus draining from the incision, increased pain or any other questions and concerns.    Go to ER with any seizure activity, mental status change (increasing confusion), difficulty with speech or increasing or acute weakness                  Follow-up Appointments     Follow-up and recommended labs and tests        F/u with Dr. Campos in 6 weeks with new neck xrays (ordered)                  Your next 10 appointments already scheduled     Mar 30, 2017  1:00 PM CDT   [facility] Visit with Darren Campso MD   [facility]    [address] [address]   [phone]            Apr 05, 2017  2:30 PM CDT   Return Visit with Patti Milligan MD   [facility])    [address] [address]   [phone]            Apr 17, 2017 11:00 AM CDT   [facility] Visit with Esteban Daly MD   [facility]    [address], [address]  [facility] [id number] [phone]           Bring a current list of meds and any records pertaining to this visit.  For Physicals, please bring immunization records and any forms needing to be filled out.   "Please arrive 10 minutes early to complete paperwork.              Pending Results     No orders found for last 3 day(s).            Statement of Approval     Ordered          02/16/17 1201  I have reviewed and agree with all the recommendations and orders detailed in this document.  EFFECTIVE NOW     Approved and electronically signed by:  Fernandez Mann PA-C             Admission Information     Date & Time Provider Department Dept. Phone    2/15/2017 Darren Campos MD [address] [facility] [phone]      Your Vitals Were     Blood Pressure Pulse Temperature Respirations Height Weight    119/71 (BP Location: Left arm) 78 97.5  F (36.4  C) (Oral) 16 1.803 m (5' 11\") 88 kg (194 lb 0.1 oz)    Pulse Oximetry BMI (Body Mass Index)                98% 27.06 kg/m2          MyChart Information     [employer] gives you secure access to your electronic health record. If you see a primary care provider, you can also send messages to your care team and make appointments. If you have questions, please call your primary care clinic.  If you do not have a primary care provider, please call [phone] and they will assist you.        Care EveryWhere ID     This is your Care EveryWhere ID. This could be used by other organizations to access your [facility] medical records  [id number]           Review of your medicines      CONTINUE these medicines which may have CHANGED, or have new prescriptions. If we are uncertain of the size of tablets/capsules you have at home, strength may be listed as something that might have changed.        Dose / Directions    * [facility] MANAGED PATIENT   This may have changed:  Another medication with the same name was added. Make sure you understand how and when to take each.   Used for:  S/P bariatric surgery        Contact [facility] for patient specific medication information at [phone] on admission and discharge from the hospital.  Phones are " "answered 24 hours a day 7 days a week 365 days a year.  Providers - Choose \"CONTINUE HOME MED (no script)\" at discharge if patient treatment with home infusion will continue.   Refills:  0       * [facility]ION MANAGED PATIENT   This may have changed:  You were already taking a medication with the same name, and this prescription was added. Make sure you understand how and when to take each.   Used for:  Severe malnutrition (H)        Contact [facility] for patient specific medication information at [phone] on admission and discharge from the hospital.  Phones are answered 24 hours a day 7 days a week 365 days a year.  Providers - Choose \"CONTINUE HOME MED (no script)\" at discharge if patient treatment with home infusion will continue.   Refills:  0       * Notice:  This list has 2 medication(s) that are the same as other medications prescribed for you. Read the directions carefully, and ask your doctor or other care provider to review them with you.      CONTINUE these medicines which have NOT CHANGED        Dose / Directions    * amphetamine-dextroamphetamine 20 MG per tablet   Commonly known as:  ADDERALL   Used for:  ADHD (attention deficit hyperactivity disorder), inattentive type        Dose:  20 mg   Take 1 tablet (20 mg) by mouth 2 times daily   Quantity:  60 tablet   Refills:  0       * amphetamine-dextroamphetamine 20 MG per tablet   Commonly known as:  ADDERALL   Used for:  ADHD (attention deficit hyperactivity disorder), inattentive type        Dose:  20 mg   Take 1 tablet (20 mg) by mouth 2 times daily   Quantity:  60 tablet   Refills:  0       * amphetamine-dextroamphetamine 20 MG per tablet   Commonly known as:  ADDERALL   Used for:  ADHD (attention deficit hyperactivity disorder), inattentive type        Dose:  20 mg   Start taking on:  3/6/2017   Take 1 tablet (20 mg) by mouth 2 times daily   Quantity:  60 tablet   Refills:  0       cyanocobalamin 1000 MCG/ML injection " "  Commonly known as:  VITAMIN B12   Used for:  Bariatric surgery status        Dose:  1 mL   Inject 1 mL (1,000 mcg) into the muscle every 30 days   Quantity:  1 mL   Refills:  11       dextrose 1,000 mL with sodium chloride 76.92 mEq solution   Used for:  Malnutrition (H), Weight loss, non-intentional, Chronic nausea        Dose:  110 mL/hr   Inject 110 mL/hr into the vein continuous   Quantity:  2640 mL   Refills:  10       diazepam 5 MG tablet   Commonly known as:  VALIUM   Used for:  Persistent insomnia, Chronic anxiety        5 mg each morning and 5 mg at bedtime   Quantity:  60 tablet   Refills:  2       fentaNYL 50 mcg/hr 72 hr patch   Commonly known as:  DURAGESIC   Used for:  Chronic abdominal pain        Dose:  1 patch   Place 1 patch onto the skin every 48 hours MYLAN BRAND ONLY. Fill on/after 2/23/17 to start on/after 2/25/17   Quantity:  15 patch   Refills:  0       lidocaine-prilocaine cream   Commonly known as:  EMLA   Used for:  Pain following surgery or procedure        Apply topically as needed for moderate pain   Quantity:  30 g   Refills:  0       morphine 0.1% in intrasite topical gel   Used for:  Skin pain        Apply as needed prior to accessing the port site.   Quantity:  100 g   Refills:  0       mupirocin 2 % ointment   Commonly known as:  BACTROBAN   Used for:  Skin infection        Apply topically 3 times daily   Quantity:  30 g   Refills:  0       Needle (Disp) 27G X 1/2\" Misc   Commonly known as:  BD DISP NEEDLES   Used for:  Bariatric surgery status        Dose:  1 Device   1 Device every 30 days Use for cyanocobalamin injection once q 30 days.   Quantity:  3 each   Refills:  4       nystatin-triamcinolone cream   Commonly known as:  MYCOLOG II   Used for:  Skin irritation        Apply topically 2 times daily as needed   Quantity:  60 g   Refills:  5       ondansetron 8 MG ODT tab   Commonly known as:  ZOFRAN-ODT   Used for:  Nausea        Dose:  8 mg   Take 1 tablet (8 mg) by mouth " every 8 hours as needed for nausea   Quantity:  90 tablet   Refills:  3       * order for DME   Used for:  Bariatric surgery status        Injection Supplies for Vitamin B12: 3cc syringes w/ 27 gauge needles, 1/2 inch length   Quantity:  12 each   Refills:  0       * order for DME   Used for:  Bilateral edema of lower extremity        Equipment being ordered: Bilateral knee high chronic venous insufficiency stockings--  mild-moderate pressures.   Quantity:  4 each   Refills:  5       oxyCODONE 5 MG/5ML solution   Commonly known as:  ROXICODONE   Used for:  Encounter for long-term opiate analgesic use        Dose:  10-15 mg   Take 10-15 mLs (10-15 mg) by mouth every 4 hours as needed for moderate to severe pain Max of 45mg per day. Fill on/after 2/8/17 not to start till 2/10/17.   Quantity:  1350 mL   Refills:  0       polyethylene glycol powder   Commonly known as:  MIRALAX   Used for:  Slow transit constipation        Dose:  1 capful   Take 17 g (1 capful) by mouth daily as needed   Quantity:  510 g   Refills:  11       senna-docusate 8.6-50 MG per tablet   Commonly known as:  SENOKOT-S;PERICOLACE   Used for:  Slow transit constipation        Dose:  1-2 tablet   Take 1-2 tablets by mouth 2 times daily as needed for constipation   Quantity:  120 tablet   Refills:  11       sucralfate 1 GM/10ML suspension   Commonly known as:  CARAFATE   Used for:  Nausea        Dose:  1 g   Take 10 mLs (1 g) by mouth 4 times daily   Quantity:  1200 mL   Refills:  11       vitamin D 39167 UNIT capsule   Commonly known as:  ERGOCALCIFEROL   Used for:  Vitamin D deficiency        Dose:  96273 Units   Take 1 capsule (50,000 Units) by mouth every 7 days   Quantity:  12 capsule   Refills:  3       * Notice:  This list has 5 medication(s) that are the same as other medications prescribed for you. Read the directions carefully, and ask your doctor or other care provider to review them with you.         Where to get your medicines      Some  "of these will need a paper prescription and others can be bought over the counter. Ask your nurse if you have questions.     You don't need a prescription for these medications     [facility] INFUSION MANAGED PATIENT                Protect others around you: Learn how to safely use, store and throw away your medicines at www.disposemymeds.org.             Medication List: This is a list of all your medications and when to take them. Check marks below indicate your daily home schedule. Keep this list as a reference.      Medications           Morning Afternoon Evening Bedtime As Needed    * amphetamine-dextroamphetamine 20 MG per tablet   Commonly known as:  ADDERALL   Take 1 tablet (20 mg) by mouth 2 times daily                                * amphetamine-dextroamphetamine 20 MG per tablet   Commonly known as:  ADDERALL   Take 1 tablet (20 mg) by mouth 2 times daily                                * amphetamine-dextroamphetamine 20 MG per tablet   Commonly known as:  ADDERALL   Take 1 tablet (20 mg) by mouth 2 times daily   Start taking on:  3/6/2017                                cyanocobalamin 1000 MCG/ML injection   Commonly known as:  VITAMIN B12   Inject 1 mL (1,000 mcg) into the muscle every 30 days                                dextrose 1,000 mL with sodium chloride 76.92 mEq solution   Inject 110 mL/hr into the vein continuous   Last time this was given:  2/16/2017  4:12 AM                                diazepam 5 MG tablet   Commonly known as:  VALIUM   5 mg each morning and 5 mg at bedtime   Last time this was given:  5 mg on 2/16/2017  6:51 AM                                * [facility] INFUSION MANAGED PATIENT   Contact [facility] for patient specific medication information at [phone] on admission and discharge from the hospital.  Phones are answered 24 hours a day 7 days a week 365 days a year.  Providers - Choose \"CONTINUE HOME MED (no script)\" at discharge if patient " "treatment with home infusion will continue.                                * [facility] INF[facility]ION MANAGED PATIENT   Contact [facility] for patient specific medication information at [phone] on admission and discharge from the hospital.  Phones are answered 24 hours a day 7 days a week 365 days a year.  Providers - Choose \"CONTINUE HOME MED (no script)\" at discharge if patient treatment with home infusion will continue.                                fentaNYL 50 mcg/hr 72 hr patch   Commonly known as:  DURAGESIC   Place 1 patch onto the skin every 48 hours MYLAN BRAND ONLY. Fill on/after 2/23/17 to start on/after 2/25/17   Last time this was given:  1 patch on 2/16/2017 11:16 AM                                lidocaine-prilocaine cream   Commonly known as:  EMLA   Apply topically as needed for moderate pain                                morphine 0.1% in intrasite topical gel   Apply as needed prior to accessing the port site.                                mupirocin 2 % ointment   Commonly known as:  BACTROBAN   Apply topically 3 times daily                                Needle (Disp) 27G X 1/2\" Misc   Commonly known as:  BD DISP NEEDLES   1 Device every 30 days Use for cyanocobalamin injection once q 30 days.                                nystatin-triamcinolone cream   Commonly known as:  MYCOLOG II   Apply topically 2 times daily as needed                                ondansetron 8 MG ODT tab   Commonly known as:  ZOFRAN-ODT   Take 1 tablet (8 mg) by mouth every 8 hours as needed for nausea                                * order for DME   Injection Supplies for Vitamin B12: 3cc syringes w/ 27 gauge needles, 1/2 inch length                                * order for DME   Equipment being ordered: Bilateral knee high chronic venous insufficiency stockings--  mild-moderate pressures.                                oxyCODONE 5 MG/5ML solution   Commonly known as:  ROXICODONE   Take 10-15 mLs " (10-15 mg) by mouth every 4 hours as needed for moderate to severe pain Max of 45mg per day. Fill on/after 2/8/17 not to start till 2/10/17.   Last time this was given:  20 mg on 2/16/2017 10:15 AM                                polyethylene glycol powder   Commonly known as:  MIRALAX   Take 17 g (1 capful) by mouth daily as needed                                senna-docusate 8.6-50 MG per tablet   Commonly known as:  SENOKOT-S;PERICOLACE   Take 1-2 tablets by mouth 2 times daily as needed for constipation                                sucralfate 1 GM/10ML suspension   Commonly known as:  CARAFATE   Take 10 mLs (1 g) by mouth 4 times daily   Last time this was given:  1 g on 2/16/2017  2:16 AM                                vitamin D 91222 UNIT capsule   Commonly known as:  ERGOCALCIFEROL   Take 1 capsule (50,000 Units) by mouth every 7 days                                * Notice:  This list has 7 medication(s) that are the same as other medications prescribed for you. Read the directions carefully, and ask your doctor or other care provider to review them with you.

## 2017-02-15 NOTE — IP AVS SNAPSHOT
[address] [facility]    [address] [facility]     [place] [address]    Phone:  [phone]                                       After Visit Summary   2/15/2017    Parker Acevedo     MRN: [id number]           After Visit Summary Signature Page     I have received my discharge instructions, and my questions have been answered. I have discussed any challenges I see with this plan with the nurse or doctor.    ..........................................................................................................................................  Patient/Patient Representative Signature      ..........................................................................................................................................  Patient Representative Print Name and Relationship to Patient    ..................................................               ................................................  Date                                            Time    ..........................................................................................................................................  Reviewed by Signature/Title    ...................................................              ..............................................  Date                                                            Time

## 2017-02-16 ENCOUNTER — MYC MEDICAL ADVICE (OUTPATIENT)
Dept: PALLIATIVE MEDICINE | Facility: CLINIC | Age: 47
End: 2017-02-16

## 2017-02-16 ENCOUNTER — APPOINTMENT (OUTPATIENT)
Dept: PHYSICAL THERAPY | Facility: CLINIC | Age: 47
DRG: 472 | End: 2017-02-16
Attending: PHYSICIAN ASSISTANT
Payer: MEDICARE

## 2017-02-16 VITALS
DIASTOLIC BLOOD PRESSURE: 71 MMHG | OXYGEN SATURATION: 98 % | SYSTOLIC BLOOD PRESSURE: 119 MMHG | RESPIRATION RATE: 16 BRPM | BODY MASS INDEX: 27.16 KG/M2 | WEIGHT: 194 LBS | HEIGHT: 71 IN | HEART RATE: 78 BPM | TEMPERATURE: 97.5 F

## 2017-02-16 LAB — GLUCOSE BLDC GLUCOMTR-MCNC: 101 MG/DL (ref 70–99)

## 2017-02-16 PROCEDURE — 40000193 ZZH STATISTIC PT WARD VISIT: Performed by: PHYSICAL THERAPIST

## 2017-02-16 PROCEDURE — 97530 THERAPEUTIC ACTIVITIES: CPT | Mod: GP | Performed by: PHYSICAL THERAPIST

## 2017-02-16 PROCEDURE — S0077 INJECTION, CLINDAMYCIN PHOSP: HCPCS | Performed by: PHYSICIAN ASSISTANT

## 2017-02-16 PROCEDURE — 40000894 ZZH STATISTIC OT IP EVAL DEFER

## 2017-02-16 PROCEDURE — 97162 PT EVAL MOD COMPLEX 30 MIN: CPT | Mod: GP | Performed by: PHYSICAL THERAPIST

## 2017-02-16 PROCEDURE — 25000125 ZZHC RX 250: Performed by: PHYSICIAN ASSISTANT

## 2017-02-16 PROCEDURE — 25800025 ZZH RX 258: Performed by: PHYSICIAN ASSISTANT

## 2017-02-16 PROCEDURE — 25000132 ZZH RX MED GY IP 250 OP 250 PS 637: Performed by: PHYSICIAN ASSISTANT

## 2017-02-16 PROCEDURE — 25000128 H RX IP 250 OP 636: Performed by: PHYSICIAN ASSISTANT

## 2017-02-16 PROCEDURE — 00000146 ZZHCL STATISTIC GLUCOSE BY METER IP

## 2017-02-16 PROCEDURE — 25000132 ZZH RX MED GY IP 250 OP 250 PS 637: Performed by: NEUROLOGICAL SURGERY

## 2017-02-16 RX ORDER — HEPARIN SODIUM (PORCINE) LOCK FLUSH IV SOLN 100 UNIT/ML 100 UNIT/ML
5 SOLUTION INTRAVENOUS
Status: DISCONTINUED | OUTPATIENT
Start: 2017-02-16 | End: 2017-02-16 | Stop reason: HOSPADM

## 2017-02-16 RX ORDER — SUCRALFATE ORAL 1 G/10ML
1 SUSPENSION ORAL 4 TIMES DAILY PRN
Status: DISCONTINUED | OUTPATIENT
Start: 2017-02-16 | End: 2017-02-16 | Stop reason: HOSPADM

## 2017-02-16 RX ORDER — FENTANYL 50 UG/1
50 PATCH TRANSDERMAL
Status: DISCONTINUED | OUTPATIENT
Start: 2017-02-16 | End: 2017-02-16 | Stop reason: HOSPADM

## 2017-02-16 RX ADMIN — SODIUM CHLORIDE 110 ML/HR: 234 INJECTION INTRAMUSCULAR; INTRAVENOUS; SUBCUTANEOUS at 04:12

## 2017-02-16 RX ADMIN — RANITIDINE 150 MG: 15 SYRUP ORAL at 10:15

## 2017-02-16 RX ADMIN — OXYCODONE HYDROCHLORIDE 20 MG: 5 SOLUTION ORAL at 10:15

## 2017-02-16 RX ADMIN — OXYCODONE HYDROCHLORIDE 20 MG: 5 SOLUTION ORAL at 06:51

## 2017-02-16 RX ADMIN — ACETAMINOPHEN 975 MG: 160 SUSPENSION ORAL at 02:06

## 2017-02-16 RX ADMIN — FENTANYL 1 PATCH: 50 PATCH, EXTENDED RELEASE TRANSDERMAL at 11:16

## 2017-02-16 RX ADMIN — CLINDAMYCIN PHOSPHATE 900 MG: 18 INJECTION, SOLUTION INTRAVENOUS at 04:25

## 2017-02-16 RX ADMIN — SODIUM CHLORIDE, PRESERVATIVE FREE 5 ML: 5 INJECTION INTRAVENOUS at 12:46

## 2017-02-16 RX ADMIN — SUCRALFATE 1 G: 1 SUSPENSION ORAL at 02:16

## 2017-02-16 RX ADMIN — OXYCODONE HYDROCHLORIDE 20 MG: 5 SOLUTION ORAL at 02:16

## 2017-02-16 RX ADMIN — DIAZEPAM 5 MG: 5 TABLET ORAL at 06:51

## 2017-02-16 NOTE — PLAN OF CARE
Problem: Goal Outcome Summary  Goal: Goal Outcome Summary  VSS< neoros intact good cms and pain controlled with oral pain med. Afebrile RA, patient ambulating with collar in place. No s/s of n/v will continue to monitor per POC

## 2017-02-16 NOTE — CONSULTS
Writer had contact with [facility] to clarify what orders needed to be written at discharge so his TPN would be resumed. Note left for Dr. Campos and charge nurse notified what orders should be written.

## 2017-02-16 NOTE — PROGRESS NOTES
S-(situation): Patient discharged to home  with  wife    B-(background): Observation goals met     A-(assessment):   VSS< neoros intact good cms and pain controlled with oral pain med. Afebrile RA, patient ambulating with collar in place. No s/s of n/v will continue to monitor per POC                             R.-(recommendations): Discharge instructions reviewed with patient and wife. Listed belongings gathered and returned to patient.  Patient Education resolved: Yes  New medications-Pt. Has been educated about reason of use and side effects Yes  Home and hospital acquired medications returned to patient Yes  Medication Bin checked and emptied on discharge Yes

## 2017-02-16 NOTE — ANESTHESIA POSTPROCEDURE EVALUATION
Patient: Parker Acevedo Sr    Procedure(s):  DISCECTOMY, FUSION CERVICAL ANTERIOR ONE LEVEL CERVICAL 5-6 - Wound Class: I-Clean    Diagnosis:cervical stenosis  Diagnosis Additional Information: No value filed.    Anesthesia Type:  General, ETT    Note:  Anesthesia Post Evaluation    Patient location: called at home.  Level of consciousness: awake and alert  Pain management: adequate  Airway patency: patent  Cardiovascular status: acceptable  Respiratory status: acceptable  Hydration status: acceptable  PONV: controlled     Anesthetic complications: None    Comments: Spoke with the patients wife on the phone.  Pt. Is doing well.  He did feel a little nauseated but the zofran worked well.        Last vitals:  Vitals:    02/15/17 2100 02/15/17 2330 02/16/17 0747   BP: 113/68 112/70 119/71   Pulse:  72 78   Resp: 16 14 16   Temp: 97.9  F (36.6  C) 98.1  F (36.7  C) 97.5  F (36.4  C)   SpO2: 97% 98% 98%         Electronically Signed By: SAILAJA Reagan CRNA  February 16, 2017  2:27 PM

## 2017-02-16 NOTE — PROGRESS NOTES
02/16/17 0934   Quick Adds   Type of Visit Initial PT Evaluation   Living Environment   Lives With spouse;child(francheska), adult   Living Arrangements house   Home Accessibility bed and bath on same level;stairs (2 railings present);stairs to enter home   Number of Stairs to Enter Home 2   Number of Stairs Within Home 0   Stair Railings at Home outside, present at both sides   Transportation Available family or friend will provide   Self-Care   Equipment Currently Used at Home cane, straight   Functional Level Prior   Ambulation 1-->assistive equipment   Transferring 1-->assistive equipment   Toileting 1-->assistive equipment   Bathing 1-->assistive equipment   Dressing 1-->assistive equipment   Eating (nursing, tube feedings)   Communication 0-->understands/communicates without difficulty   Fall history within last six months yes   Number of times patient has fallen within last six months 3   Which of the above functional risks had a recent onset or change? ambulation;transferring   General Information   Onset of Illness/Injury or Date of Surgery - Date 02/15/17   Referring Physician Dr Ortiz   Patient/Family Goals Statement go home   Pertinent History of Current Problem (include personal factors and/or comorbidities that impact the POC) on supplemental feedings at home 2 days a week with nursing due to bypass (was 500#) and now malnourished.  Has had PT prior to surgery with no help in controlling symptoms   Precautions/Limitations spinal precautions;other (see comments)  (ant cerv fusion precautions)   General Observations IV hooked up in pt's line.  Pt is resting in recliner with neck brace on.   General Info Comments impulsive   Cognitive Status Examination   Orientation orientation to person, place and time   Level of Consciousness alert   Follows Commands and Answers Questions 100% of the time   Personal Safety and Judgment impulsive  (moving too quickly, not using good spine precuations)   Memory intact    Integumentary/Edema   Integumentary/Edema no deficits were identifed   Posture    Posture Forward head position;Protracted shoulders;Kyphosis   Posture Comments severly rounded forward from thoracic on up.  wearing hard cerv collar but correcting posture at just neck mm   Strength   Strength Comments functional strength in bilat UE and LE and trunk and neck   Transfer Skills   Transfer Comments independent except cueing pt to put donw foot rest before climbing out of chair and assisting in putting down this foot rest. SPT, sit-stand independent without assistive device but able to grab cane for walking on own   Gait   Gait Comments SBA to begin as he was impulsive--needing cues to slow down and work on posture with gait.   Balance   Balance Comments moving quickly and bending a lot --needing cues to slow down and use proper spinal stab, but safe with balance after training   General Therapy Interventions   Planned Therapy Interventions other (see comments)  (precaution training, posture training for walking and sit)   Intervention Comments train on slowing and proper spine stab and decreased use of eyes for isometric pull on neck   Clinical Impression   Criteria for Skilled Therapeutic Intervention yes, treatment indicated   PT Diagnosis recent ant cerv fusion, posture deficits   Influenced by the following impairments poor posture and spine stab   Functional limitations due to impairments posture, walking without neck strain, mobilty and supported rest postures pulling on mm --strain, overuse, mm weakness at upper core   Clinical Presentation Evolving/Changing   Clinical Presentation Rationale impulsive, history of learned habits, various cares to coordinate training, brittle body with co-morbidities of health conditions   Clinical Decision Making (Complexity) Moderate complexity   Therapy Frequency` daily  (once)   Predicted Duration of Therapy Intervention (days/wks) once   Anticipated Discharge Disposition  "Home with Assist   Risk & Benefits of therapy have been explained Yes   Patient, Family & other staff in agreement with plan of care Yes   Clinical Impression Comments Pt also seen taking home meds on own, stating he was told to take these as the hospital did  not have them.  I reported this to his nurse.   [facility] TM \"6 Clicks\"   2016, Trustees of [facility], under license to [employer].  All rights reserved.   6 Clicks Short Forms Basic Mobility Inpatient Short Form   [facility] AM-PAC  \"6 Clicks\" V.2 Basic Mobility Inpatient Short Form   1. Turning from your back to your side while in a flat bed without using bedrails? 4 - None   2. Moving from lying on your back to sitting on the side of a flat bed without using bedrails? 4 - None   3. Moving to and from a bed to a chair (including a wheelchair)? 4 - None   4. Standing up from a chair using your arms (e.g., wheelchair, or bedside chair)? 4 - None   5. To walk in hospital room? 3 - A Little   6. Climbing 3-5 steps with a railing? 3 - A Little   Basic Mobility Raw Score (Score out of 24.Lower scores equate to lower levels of function) 22   Total Evaluation Time   Total Evaluation Time (Minutes) 15     "

## 2017-02-16 NOTE — PROGRESS NOTES
S-(situation): Note    B-(background): Cervical fusion    A-(assessment): Note that pt is follow by a pain clinic, is using oxycodone for pain every 3 hours, valium at bedtime and pt also took his med from home Adderral  Which he takes every night.  Zofran given for a wave of nausea.  Pt sits straight up in bed and refuses to lay down, cervical collar is in place.  Pt is up to the BR to void.  VSS and neuro's are good.  SCD's are in place.      R-(recommendations): Cont poc as ordered.

## 2017-02-16 NOTE — PROGRESS NOTES
S-(situation): Patient registered to Observation. Patient arrived to room 255 via Cart from PACU    B-(background): Cervical fusion    A-(assessment): Please review system assessment and VS.  Pt settled and oriented to rm    R-(recommendations): Orders and observation goals reviewed with Pt.    Nursing Observation criteria listed below was met:    Skin issues/needs documented:Yes  Isolation needs addressed, if appropriate: NA  Fall Prevention: Education given and documented and signage used: Yes  Education Assessment documented:Yes  Education Documented (Pre-existing chronic infection such as, MRSA/VRE need education on admission): Yes  New medication patient education completed and documented (Possible Side Effects of Common Medications handout): Yes  Home medications if not able to send immediately home with family stored here: NA  Reminder note placed in discharge instructions: NA  Patient has discharge needs (If yes, please explain): No

## 2017-02-16 NOTE — PLAN OF CARE
Problem: Goal Outcome Summary  Goal: Goal Outcome Summary  Outcome: Improving  VSS on RA.  Neuro checks WDLs.  Afebrile.  LS clear.  Cervical collar in place.  Encouraged Pt to transfer to recliner, as Pt refused to lay back, bring legs up into bed and put side rail up.  Pt agreed, transferred with 1 assist- little unsteady.  SCD's are in place. Received Oxycodone and valium per Pt request for pain, along with fentanyl patch in place.  IVFs infusing, admin Abx and carafate.  During the night Pt got irritated with capnography tubing- see charge nurse note.

## 2017-02-16 NOTE — PLAN OF CARE
Problem: Goal Outcome Summary  Goal: Goal Outcome Summary  Pt was seen for eval today.  He is mobile to go home with wife and adult son.  [facility] nurse comes in home 2 time a week already.  Instructed pt to wait on neck ex until he sees MD for follow-up.  Urged him to work on ant cerv fusion precautions.  He is impulsive--worked to slow down and not keep head down, but also to lift head with upper back posture vs push into back of his brace with his neck.  He is Ok to D/C home and follow protocol with next step being surgeon follow up in 6 weeks.         Goal status:instructed in precautions and postures and support to use at home--met     Functional status: can walk with cane independently--OK for home environment     Areas of progress:able to D/C    Barrier to discharge Home: none     Equipment needs: none     Discharge disposition/rationale: home with family     Transport recommendations: family, car

## 2017-02-16 NOTE — PLAN OF CARE
Problem: Goal Outcome Summary  Goal: Goal Outcome Summary  S-(situation): OT evaluation and treatment order     B-(background): Patient is s/p cervical fusion.      A-(assessment): Collaboration with PT that OT is not necessary at this time.  Will reassess needs at another time if changes occur.      R-(recommendations): Reviewed with patient safe positioning during BADL with no concerns of patient completing at home.

## 2017-02-17 NOTE — DISCHARGE SUMMARY
[facility]    Discharge Summary  Neurosurgery    Date of Admission:  2/15/2017  Date of Discharge:  2/16/2017  1:05 PM  Discharging Provider: Darren Campos  Date of Service (when I saw the patient): I did not personally see this patient today.    Discharge Diagnoses   Active Problems:    Status post cervical spinal arthrodesis      History of Present Illness   Parker Acevedo Sr is an 46 year old male who presented with neck and arm pain    Hospital Course   Parker Acevedo Sr was admitted on 2/15/2017.  The following problems were addressed during his hospitalization:    Active Problems:    Status post cervical spinal arthrodesis    Assessment: S/P ACDF    Plan: Routine post-op care        Significant Results and Procedures   ACDF    Pending Results   These results will be followed up by myself  Unresulted Labs Ordered in the Past 30 Days of this Admission     No orders found from 12/17/2016 to 2/16/2017.          Code Status   Full Code    Primary Care Physician   Esteban Daly    Physical Exam                      Vitals:    02/15/17 1630   Weight: 88 kg (194 lb 0.1 oz)     Vital Signs with Ranges     I/O last 3 completed shifts:  In: 1400 [I.V.:1400]  Out: -     HEENT:  Normocephalic, atraumatic.  PERRLA.  EOM s intact.  Visual fields full to gross exam  Neck:  Supple, non-tender, without lymphadenopathy.  Heart:  No peripheral edema  Lungs:  No SOB  Abdomen:  Non-distended.   Skin:  Warm and dry.  Extremities:  No edema, cyanosis or clubbing.    NEUROLOGICAL EXAMINATION:     Mental status:  Alert and Oriented x 3, speech is fluent.  Cranial nerves:  II-XII intact.   Motor:  Strength is 5/5 throughout the upper and lower extremities  Shoulder Abduction:  Right:  5/5   Left:  5/5  Biceps:                      Right:  5/5   Left:  5/5  Triceps:                     Right:  5/5   Left:  5/5  Wrist Extensors:       Right:  5/5   Left:  5/5  Wrist Flexors:            Right:  5/5   Left:  5/5  interosseus :            Right:  5/5   Left:  5/5   Hip Flexor:                Right: 5/5  Left:  5/5  Hip Adductor:             Right:  5/5  Left:  5/5  Hip Abductor:             Right:  5/5  Left:  5/5  Gastroc Soleus:        Right:  5/5  Left:  5/5  Tib/Ant:                      Right:  5/5  Left:  5/5  EHL:                     Right:  5/5  Left:  5/5  Sensation:  Intact  Reflexes:  Negative Babinski.  Negative Clonus.  Negative Johnson's.  Coordination:  Smooth finger to nose testing.   Negative pronator drift.  Smooth tandem walking  Incision c/d/i    Time Spent on this Encounter   I, Darren Campos, discharged this patient today but I did not personally see the patient today and will not be billing for the patient's discharge.    Discharge Disposition   Discharged to home  Condition at discharge: Good    Consultations This Hospital Stay   OCCUPATIONAL THERAPY ADULT IP CONSULT  PHYSICAL THERAPY ADULT IP CONSULT  ORTHOSIS CERVICAL COLLAR IP CONSULT  CARE TRANSITION RN/SW IP CONSULT    Discharge Orders     Reason for your hospital stay   Neck surgery     Follow-up and recommended labs and tests    F/u with Dr. Campos in 6 weeks with new neck xrays (ordered)     Activity   Discharge instructions:  No lifting of more than 10 pounds until follow up visit.  Ok to shampoo hair, shower or bathe, but, do not scrub or submerge incision under water until evaluated post operatively in clinic.    Ok to walk as tolerated, avoid bedrest.    No contact sports until after follow up visit  No high impact activities such as; running/jogging, snowmobile or 4 thompson riding or any other recreational vehicles    Call my office at [phone] for increasing redness, swelling or pus draining from the incision, increased pain or any other questions and concerns.    Go to ER with any seizure activity, mental status change (increasing confusion), difficulty with speech or increasing or acute weakness     Discharge  "Instructions   Discharge instructions:  No lifting of more than 10 pounds until follow up visit.  Ok to shampoo hair, shower or bathe, but, do not scrub or submerge incision under water until evaluated post operatively in clinic.    Ok to walk as tolerated, avoid bedrest.    No contact sports until after follow up visit  No high impact activities such as; running/jogging, snowmobile or 4 thompson riding or any other recreational vehicles    Call my office at [phone] for increasing redness, swelling or pus draining from the incision, increased pain or any other questions and concerns.    Go to ER with any seizure activity, mental status change (increasing confusion), difficulty with speech or increasing or acute weakness     Full Code     Diet   Follow this diet upon discharge: Resume usual diet       Discharge Medications   Discharge Medication List as of 2/16/2017 12:58 PM      START taking these medications    Details   !! [place] HOME INFUSION MANAGED PATIENT Contact [facility] for patient specific medication information at [phone] on admission and discharge from the hospital.  Phones are answered 24 hours a day 7 days a week 365 days a year.    Providers - Choose \"CONTINUE HOME MED (no scr ipt)\" at discharge if patient treatment with home infusion will continue., No Print OutContinue home infusion       !! - Potential duplicate medications found. Please discuss with provider.      CONTINUE these medications which have NOT CHANGED    Details   oxyCODONE (ROXICODONE) 5 MG/5ML solution Take 10-15 mLs (10-15 mg) by mouth every 4 hours as needed for moderate to severe pain Max of 45mg per day. Fill on/after 2/8/17 not to start till 2/10/17., Disp-1350 mL, R-0, Local Print      fentaNYL (DURAGESIC) 50 mcg/hr 72 hr patch Place 1 patch onto the skin every 48 hours MYLAN BRAND ONLY. Fill on/after 2/23/17 to start on/after 2/25/17, Disp-15 patch, R-0, Local Print      morphine 0.1% in intrasite " "topical gel Apply as needed prior to accessing the port site., Disp-100 g, R-0, Local Print      ondansetron (ZOFRAN-ODT) 8 MG ODT tab Take 1 tablet (8 mg) by mouth every 8 hours as needed for nausea, Disp-90 tablet, R-3, E-Prescribe      vitamin D (ERGOCALCIFEROL) 97627 UNIT capsule Take 1 capsule (50,000 Units) by mouth every 7 days, Disp-12 capsule, R-3, E-Prescribe      sucralfate (CARAFATE) 1 GM/10ML suspension Take 10 mLs (1 g) by mouth 4 times daily, Disp-1200 mL, R-11, E-Prescribe      diazepam (VALIUM) 5 MG tablet 5 mg each morning and 5 mg at bedtime, Disp-60 tablet, R-2, Local Print      cyanocobalamin (VITAMIN B12) 1000 MCG/ML injection Inject 1 mL (1,000 mcg) into the muscle every 30 days, Disp-1 mL, R-11, E-Prescribe      !! amphetamine-dextroamphetamine (ADDERALL) 20 MG per tablet Take 1 tablet (20 mg) by mouth 2 times daily, Disp-60 tablet, R-0, Local Print      polyethylene glycol (MIRALAX) powder Take 17 g (1 capful) by mouth daily as needed, Disp-510 g, R-11, E-Prescribe      nystatin-triamcinolone (MYCOLOG II) cream Apply topically 2 times daily as neededDisp-60 g, [id number]      mupirocin (BACTROBAN) 2 % ointment Apply topically 3 times dailyDisp-30 g, [id number]      lidocaine-prilocaine (EMLA) cream Apply topically as needed for moderate painDisp-30 g, [id number]      Needle, Disp, (BD DISP NEEDLES) 27G X 1/2\" MISC 1 Device every 30 days Use for cyanocobalamin injection once q 30 days., Disp-3 each, R-4, E-Prescribe      !! amphetamine-dextroamphetamine (ADDERALL) 20 MG per tablet Take 1 tablet (20 mg) by mouth 2 times daily, Disp-60 tablet, R-0, Local Print      !! amphetamine-dextroamphetamine (ADDERALL) 20 MG per tablet Take 1 tablet (20 mg) by mouth 2 times daily, Disp-60 tablet, R-0, Local Print      dextrose 1,000 mL with sodium chloride 76.92 mEq solution Inject 110 mL/hr into the vein continuous, 110 mL/hr, Intravenous, CONTINUOUS Starting 10/28/2016, Until Discontinued, " "Disp-2640 mL, R-10, FaxAdd 10 meq KCl/L of D10 1/2 NS      !! order for DME Equipment being ordered: Bilateral knee high chronic venous insufficiency stockings--  mild-moderate pressures.Disp-4 each, R-5, Local Print      !! [facility] INFUSION MANAGED PATIENT Contact [facility] for patient specific medication information at [phone] on admission and discharge from the hospital.  Phones are answered 24 hours a day 7 days a week 365 days a year.    Providers - Choose \"CONTINUE HOME MED (no scr ipt)\" at discharge if patient treatment with home infusion will continue., No Print OutResume prior to admission TPN/Lipids/saline      senna-docusate (SENOKOT-S;PERICOLACE) 8.6-50 MG per tablet Take 1-2 tablets by mouth 2 times daily as needed for constipation, Disp-120 tablet, R-11, E-Prescribe      !! order for DME Injection Supplies for Vitamin B12: 3cc syringes w/ 27 gauge needles, 1/2 inch lengthDisp-12 each, R-0, Local Print       !! - Potential duplicate medications found. Please discuss with provider.        Allergies   Allergies   Allergen Reactions     Penicillins Anaphylaxis     Doxycycline Rash     Vancomycin Rash     Rash after receiving vancomycin 3/28/16 (red man's?). Tolerated with slower infusion and diphenhydramine premed.             "

## 2017-02-17 NOTE — TELEPHONE ENCOUNTER
My chart message from pt:    Dr Campos wants Parker to take 20mg every 4 hours for the next 2 weeks if you have any questions feel free to call us at [phone] so he will be out by the time his fentynal patches are do so he will need another percription sent over to [place] pharmacy in [facility]     Sending this to provider before sending to [facility] to prep refill.     Karo Nuñez RN, [facility]  Pain Clinic Care Coordinator

## 2017-02-17 NOTE — TELEPHONE ENCOUNTER
PA denied. Patient's benefit plan does not cover the requested medication.    Provider, do you want to appeal? Or change medication? Or patient to pay out of pocket?

## 2017-02-19 ENCOUNTER — MYC MEDICAL ADVICE (OUTPATIENT)
Dept: PALLIATIVE MEDICINE | Facility: CLINIC | Age: 47
End: 2017-02-19

## 2017-02-20 RX ORDER — OXYCODONE HCL 5 MG/5 ML
10-20 SOLUTION, ORAL ORAL EVERY 4 HOURS PRN
Qty: 1000 ML | Refills: 0 | Status: SHIPPED | OUTPATIENT
Start: 2017-02-20 | End: 2017-03-09

## 2017-02-20 NOTE — TELEPHONE ENCOUNTER
Signed Rx mailing on 02/21/17, from [facility]. Mailing to [place] pharmacy, [place].  Spoke with patient about dosing.

## 2017-02-20 NOTE — TELEPHONE ENCOUNTER
Patient has not had recent overuse or loss of med.  Will document in the medication notes.  Will continue to monitor for any other concerning behavior.    Jessica Milligan MD  [facility]

## 2017-02-20 NOTE — TELEPHONE ENCOUNTER
See the 2/14/17 telephone encounter for more information.    Demetrice Yeh RN-BSN  [facility]

## 2017-02-20 NOTE — PROGRESS NOTES
"Parker Acevedo Sr  Gender: male  : 1970  [address]  [place] [id number] [phone] (home)     Medical Record: [id number]  Pharmacy:    [facility] [place] [place] - [address] [facility] [facility]  Primary Care Provider: Esteban Daly    Parent's names are: ELLIE ACEVEDO (mother) and Data Unavailable (father).      [facility]      Discharge Phone Call:  Key Words/Key Times      Introduction - AIDET (Acknowledge, Introduce, Duration, Explanation)      Empathy-   We are calling to see how you are since your recent stay in the hospital?     Call back COMMENTS: Patient is doing very well. Home health is going very well. Nurses are helping with IV hydration and TPN daily.       Clinical Questions -  (f/u appts, medication side effects/purpose, ability to care for self at home) \"For your safety, it is important to us that you understand the purpose and side effects of your medications, can you tell me what your new medications are?\"     Call back COMMENTS: No questions or concerns at this time. Home health nurses have been very good.       Staff Recognition -  We like to recognize staff and physicians who have done an excellent job.  Do you remember any people from your care team that you would like recognize?     Call back COMMENTS: All medical staff was excellent.       Very Good Care -  We want to provide very good care to all patients.  How was your care?     Call back COMMENTS: Good care.       Opportunities for Improvement -  Our goal is to be the best.  Do you have any suggestions for things that we could improve upon?     Call back COMMENTS: No concerns.       Thank You         Safe Accounts (purposfully retained items) Examples: CHICA's, Hemovac, penrose,Wound packing, Ureteral stents, Sanchez, PIV/Picc Line    We care about the coordination of your care  1. Do you still have Port a Cath in place? Yes.   2. If the item is in " place, Can you review the plan for removal with me? Once I don't need IV fluids (TPN) anymore it will be taken out. But it will be more long-term.     Completed by Tanya Lai RN on 2/20/2017.

## 2017-02-20 NOTE — TELEPHONE ENCOUNTER
Communicated with Dr. Campos,  Asked if dosing was appropriate.  He stated it was.    I recommend oxycodone 20mg q4hr prn, I do not recommend any more frequently than that.    Signed Prescriptions:                        Disp   Refills    oxyCODONE (ROXICODONE) 5 MG/5ML solution   1000 mL0        Sig: Take 10-20 mLs (10-20 mg) by mouth every 4 hours as           needed for moderate to severe pain , for 2 weeks,           then tapering back to 10-15mg every 4 hours as           needed. Ok to fill early due to surgery, spill of           medications  Authorizing Provider: BRANDYN JENKINS MD  [facility]

## 2017-02-20 NOTE — TELEPHONE ENCOUNTER
My chart message from pt:     Dr Campos wants Parker to take 20mg every 4 hours for the next 2 weeks if you have any questions feel free to call us at [phone] so he will be out by the time his fentynal patches are do so he will need another percription sent over to [place] pharmacy in [facility]      Sending this to provider before sending to [facility] to prep refill.      Karo Nuñez RN, [facility]  Pain Clinic Care Coordinator

## 2017-02-20 NOTE — TELEPHONE ENCOUNTER
My chart message from pt:    I just wanted to let you know that on Febuary 18th 2017 early morning when I went to pour my medicine I droped the bottle and I spilled 90mg if you have any questions feel free to call me at any time I just wanted to let you know that Thanks again     Sending to provider for FYI only.     Karo Nuñez RN, [facility]  Pain Clinic Care Coordinator

## 2017-02-20 NOTE — TELEPHONE ENCOUNTER
Dr. Campos,  Can you confirm the plan?  I reviewed your discharge summary which did not state these doses.    Jessica Milligan MD  [facility]

## 2017-02-20 NOTE — TELEPHONE ENCOUNTER
"Pt called on 2/17/17 at 6:50pm     \"Just got out of my neck surgery .Okay to use 20 mgs every 3-4 hours for the next 2 weeks. Did spill 100mls off a step stool and my Wife saw it. Will need the fentanyl patches and Oxycodone about the same time. \"   Will route to Dr. Milligan to get approval for early request of Oxycodone first .   .Lisy terry rn    "

## 2017-02-22 ENCOUNTER — MYC MEDICAL ADVICE (OUTPATIENT)
Dept: PALLIATIVE MEDICINE | Facility: CLINIC | Age: 47
End: 2017-02-22

## 2017-02-22 ENCOUNTER — TELEPHONE (OUTPATIENT)
Dept: PALLIATIVE MEDICINE | Facility: CLINIC | Age: 47
End: 2017-02-22

## 2017-02-22 NOTE — TELEPHONE ENCOUNTER
Rose is calling to find out if she can  the patients script today, there is no documentation of whether it is ready or not. Rose states that her  did get a call saying it was ready but not sure if it was mailed or at the , please call.        Carly HOWARD    [facility]

## 2017-02-22 NOTE — TELEPHONE ENCOUNTER
kaitlynnt sent to pt:  The oxycodone script was mailed to [facility].  It went out in today's mail.    Demetrice Yeh RN-BSN  [facility]

## 2017-02-22 NOTE — TELEPHONE ENCOUNTER
See the other mychart encounter for more information.  Demetrice Yeh RN-BSN  [facility]

## 2017-02-22 NOTE — TELEPHONE ENCOUNTER
See the 2/22/17 mychart encounter for more information.    Demetrice Yeh RN-BSN  [facility]

## 2017-02-24 ENCOUNTER — TELEPHONE (OUTPATIENT)
Dept: FAMILY MEDICINE | Facility: CLINIC | Age: 47
End: 2017-02-24

## 2017-02-24 NOTE — TELEPHONE ENCOUNTER
Please contact insurance to try to get a Prior Auth for quantity on the Ondansetron 8mg ODT tablets.  It was prescribed so he could get # 90 at a time but insurance limits to 18 tablets per 21 days (and seems to only let him get it filled once per month for #18).    Insurance:  [employer] - [phone]  Pt ID:  [id number]      Thank you   -Fany John, Pharm.D., [facility], [phone]

## 2017-02-24 NOTE — TELEPHONE ENCOUNTER
Re-faxed the PA appeal letter and added new comments requesting insurance cover #90 for 90 days or #30 for 30 days - ondansetron (per PA encounter 1/4/2017)    Awaiting decision on this PA

## 2017-02-27 ENCOUNTER — MYC REFILL (OUTPATIENT)
Dept: FAMILY MEDICINE | Facility: CLINIC | Age: 47
End: 2017-02-27

## 2017-02-27 DIAGNOSIS — R11.0 NAUSEA: ICD-10-CM

## 2017-02-27 RX ORDER — SUCRALFATE ORAL 1 G/10ML
1 SUSPENSION ORAL 4 TIMES DAILY
Qty: 1200 ML | Refills: 11 | Status: CANCELLED | OUTPATIENT
Start: 2017-02-27

## 2017-02-27 NOTE — TELEPHONE ENCOUNTER
Received another note from [facility] stating PA is approved    Effective 2/27/2017 through 8/27/2017    Pharmacy informed

## 2017-02-28 NOTE — TELEPHONE ENCOUNTER
Message from [employer]:  Original authorizing provider: MD Parker Camara  would like a refill of the following medications:  sucralfate (CARAFATE) 1 GM/10ML suspension [Esteban Daly MD]    Preferred pharmacy: [address]    Comment:      Medication renewals requested in this message routed to other providers:  lidocaine-prilocaine (EMLA) cream [Lizbeth Lester, APRN CNP]

## 2017-03-02 ENCOUNTER — CARE COORDINATION (OUTPATIENT)
Dept: CARE COORDINATION | Facility: CLINIC | Age: 47
End: 2017-03-02

## 2017-03-02 NOTE — PROGRESS NOTES
Clinic Care Coordination Contact  Care Team Conversations      Clinical Data: RN CC called to f/u with pt's wife. Pt has surgery on his neck (see hospital notes for details) on 2/15/17. Wife reports the pt is doing well post surgery. Pain is controlled. Wife continues to provide rides to apts as pt cannot drive due to narcotic use and recent surgery. No current questions or concerns at this time.     Resources Given: Encouraged wife to call  regarding social security questions.     Plan: No current concerns or issues noted at this time. RN CC will f/u with pt/wife in 4-6 weeks. Pt/wife to call sooner if needed.     Joythi Byrd RN-BSN  RN Clinic Care Coordinator  [facility]  [phone]

## 2017-03-07 ENCOUNTER — MYC MEDICAL ADVICE (OUTPATIENT)
Dept: INFECTIOUS DISEASES | Facility: CLINIC | Age: 47
End: 2017-03-07

## 2017-03-07 ENCOUNTER — TELEPHONE (OUTPATIENT)
Dept: FAMILY MEDICINE | Facility: CLINIC | Age: 47
End: 2017-03-07

## 2017-03-07 ENCOUNTER — MYC MEDICAL ADVICE (OUTPATIENT)
Dept: FAMILY MEDICINE | Facility: CLINIC | Age: 47
End: 2017-03-07

## 2017-03-07 LAB
ALBUMIN SERPL-MCNC: 3.5 G/DL (ref 3.4–5)
ALP SERPL-CCNC: 91 U/L (ref 40–150)
ALT SERPL W P-5'-P-CCNC: 22 U/L (ref 0–70)
ANION GAP SERPL CALCULATED.3IONS-SCNC: 8 MMOL/L (ref 3–14)
AST SERPL W P-5'-P-CCNC: 27 U/L (ref 0–45)
BASOPHILS # BLD AUTO: 0 10E9/L (ref 0–0.2)
BASOPHILS NFR BLD AUTO: 0.4 %
BILIRUB DIRECT SERPL-MCNC: <0.1 MG/DL (ref 0–0.2)
BILIRUB SERPL-MCNC: 0.7 MG/DL (ref 0.2–1.3)
BUN SERPL-MCNC: 15 MG/DL (ref 7–30)
CALCIUM SERPL-MCNC: 8.3 MG/DL (ref 8.5–10.1)
CHLORIDE SERPL-SCNC: 108 MMOL/L (ref 94–109)
CK SERPL-CCNC: NORMAL U/L (ref 30–300)
CO2 SERPL-SCNC: 28 MMOL/L (ref 20–32)
CREAT SERPL-MCNC: 0.62 MG/DL (ref 0.66–1.25)
DIFFERENTIAL METHOD BLD: ABNORMAL
EOSINOPHIL # BLD AUTO: 0.1 10E9/L (ref 0–0.7)
EOSINOPHIL NFR BLD AUTO: 1.5 %
ERYTHROCYTE [DISTWIDTH] IN BLOOD BY AUTOMATED COUNT: 14 % (ref 10–15)
GFR SERPL CREATININE-BSD FRML MDRD: ABNORMAL ML/MIN/1.7M2
GLUCOSE SERPL-MCNC: 66 MG/DL (ref 70–99)
HCT VFR BLD AUTO: 41.3 % (ref 40–53)
HGB BLD-MCNC: 13.4 G/DL (ref 13.3–17.7)
IMM GRANULOCYTES # BLD: 0 10E9/L (ref 0–0.4)
IMM GRANULOCYTES NFR BLD: 0.3 %
LYMPHOCYTES # BLD AUTO: 2.1 10E9/L (ref 0.8–5.3)
LYMPHOCYTES NFR BLD AUTO: 26.2 %
MAGNESIUM SERPL-MCNC: 2.2 MG/DL (ref 1.6–2.3)
MCH RBC QN AUTO: 30.7 PG (ref 26.5–33)
MCHC RBC AUTO-ENTMCNC: 32.4 G/DL (ref 31.5–36.5)
MCV RBC AUTO: 95 FL (ref 78–100)
MONOCYTES # BLD AUTO: 0.9 10E9/L (ref 0–1.3)
MONOCYTES NFR BLD AUTO: 11.4 %
NEUTROPHILS # BLD AUTO: 4.8 10E9/L (ref 1.6–8.3)
NEUTROPHILS NFR BLD AUTO: 60.2 %
NRBC # BLD AUTO: 0 10*3/UL
NRBC BLD AUTO-RTO: 0 /100
PHOSPHATE SERPL-MCNC: 3.7 MG/DL (ref 2.5–4.5)
PLATELET # BLD AUTO: 183 10E9/L (ref 150–450)
POTASSIUM SERPL-SCNC: 3.8 MMOL/L (ref 3.4–5.3)
PREALB SERPL IA-MCNC: 28 MG/DL (ref 15–45)
PROT SERPL-MCNC: 7.3 G/DL (ref 6.8–8.8)
RBC # BLD AUTO: 4.37 10E12/L (ref 4.4–5.9)
SODIUM SERPL-SCNC: 144 MMOL/L (ref 133–144)
TRIGL SERPL-MCNC: 44 MG/DL
WBC # BLD AUTO: 7.9 10E9/L (ref 4–11)

## 2017-03-07 PROCEDURE — 82550 ASSAY OF CK (CPK): CPT | Performed by: SURGERY

## 2017-03-07 PROCEDURE — 84478 ASSAY OF TRIGLYCERIDES: CPT | Performed by: SURGERY

## 2017-03-07 PROCEDURE — 83735 ASSAY OF MAGNESIUM: CPT | Performed by: SURGERY

## 2017-03-07 PROCEDURE — 84100 ASSAY OF PHOSPHORUS: CPT | Performed by: SURGERY

## 2017-03-07 PROCEDURE — 87040 BLOOD CULTURE FOR BACTERIA: CPT | Performed by: SURGERY

## 2017-03-07 PROCEDURE — 87070 CULTURE OTHR SPECIMN AEROBIC: CPT | Performed by: SURGERY

## 2017-03-07 PROCEDURE — 84134 ASSAY OF PREALBUMIN: CPT | Performed by: SURGERY

## 2017-03-07 PROCEDURE — 82248 BILIRUBIN DIRECT: CPT | Performed by: SURGERY

## 2017-03-07 PROCEDURE — 85025 COMPLETE CBC W/AUTO DIFF WBC: CPT | Performed by: SURGERY

## 2017-03-07 PROCEDURE — 80053 COMPREHEN METABOLIC PANEL: CPT | Performed by: SURGERY

## 2017-03-07 NOTE — TELEPHONE ENCOUNTER
**Provider please advise. Home care wanting to do blood cultures and/or wound culture swab. Please advise if appropriate, or if patient needs to be seen.     Parker Acevedo Sr is a 46 year old male who calls with concerns of possible infection.    NURSING ASSESSMENT:  Description:  1 week has had yellow bump forming on port, access location. No drainage present currently. Site is sore. Temp of 99.7F with TPN treatment last night. More fatigue than usual. No worsening of nausea/vomiting.   Onset/duration:  1 week  Precip. factors:  Port for treatment  Associated symptoms:  See above  Improves/worsens symptoms:  n/a  Pain scale (0-10)   4/10  LMP/preg/breast feeding:  n/a  Last exam/Treatment:  2/13/2017  Allergies:   Allergies   Allergen Reactions     Penicillins Anaphylaxis     Doxycycline Rash     Vancomycin Rash     Rash after receiving vancomycin 3/28/16 (red man's?). Tolerated with slower infusion and diphenhydramine premed.       MEDICATIONS:   Taking medication(s) as prescribed? N/A  Taking over the counter medication(s?) N/A  Any medication side effects? Not Applicable    Any barriers to taking medication(s) as prescribed?  Yes  Medication(s) improving/managing symptoms?  N/A  Medication reconciliation completed: N/A      NURSING PLAN: Nursing advice to patient will huddle with provider for plan. will have order tests per recommendation    RECOMMENDED DISPOSITION:  Home care advice - see nursing plan.  Will comply with recommendation: Yes  If further questions/concerns or if symptoms do not improve, worsen or new symptoms develop, call your PCP or [facility] as soon as possible.      Guideline used:  Telephone Triage Protocols for Nurses, Fourth Edition, Rand Maher RN

## 2017-03-07 NOTE — TELEPHONE ENCOUNTER
[facility] called and needs verbal orders for this patient.  Pt has a history of topical port infections and blood infections. He is currently  showing signs of a topical infection over his port.  She would like orders for a topical culture and blood culture today. Please call to give verbal orders.

## 2017-03-08 ENCOUNTER — MYC MEDICAL ADVICE (OUTPATIENT)
Dept: FAMILY MEDICINE | Facility: CLINIC | Age: 47
End: 2017-03-08

## 2017-03-08 LAB — CK SERPL-CCNC: 95 U/L (ref 30–300)

## 2017-03-08 PROCEDURE — 82550 ASSAY OF CK (CPK): CPT | Performed by: SURGERY

## 2017-03-08 PROCEDURE — 87040 BLOOD CULTURE FOR BACTERIA: CPT | Performed by: SURGERY

## 2017-03-09 ENCOUNTER — MYC MEDICAL ADVICE (OUTPATIENT)
Dept: PALLIATIVE MEDICINE | Facility: CLINIC | Age: 47
End: 2017-03-09

## 2017-03-09 ENCOUNTER — TELEPHONE (OUTPATIENT)
Dept: FAMILY MEDICINE | Facility: CLINIC | Age: 47
End: 2017-03-09

## 2017-03-09 ENCOUNTER — CARE COORDINATION (OUTPATIENT)
Dept: CARE COORDINATION | Facility: CLINIC | Age: 47
End: 2017-03-09

## 2017-03-09 DIAGNOSIS — R10.9 CHRONIC ABDOMINAL PAIN: ICD-10-CM

## 2017-03-09 DIAGNOSIS — G89.29 CHRONIC ABDOMINAL PAIN: ICD-10-CM

## 2017-03-09 DIAGNOSIS — Z79.891 ENCOUNTER FOR LONG-TERM OPIATE ANALGESIC USE: ICD-10-CM

## 2017-03-09 LAB
BACTERIA SPEC CULT: NO GROWTH
MICRO REPORT STATUS: NORMAL
SPECIMEN SOURCE: NORMAL

## 2017-03-09 RX ORDER — FENTANYL 50 UG/1
1 PATCH TRANSDERMAL
Qty: 15 PATCH | Refills: 0 | Status: SHIPPED | OUTPATIENT
Start: 2017-03-09 | End: 2017-04-11

## 2017-03-09 RX ORDER — FENTANYL 50 UG/1
1 PATCH TRANSDERMAL
Qty: 15 PATCH | Refills: 0 | Status: SHIPPED | OUTPATIENT
Start: 2017-03-09 | End: 2017-03-09

## 2017-03-09 RX ORDER — OXYCODONE HCL 5 MG/5 ML
10-15 SOLUTION, ORAL ORAL EVERY 4 HOURS PRN
Qty: 1350 ML | Refills: 0 | Status: SHIPPED | OUTPATIENT
Start: 2017-03-09 | End: 2017-03-09

## 2017-03-09 RX ORDER — OXYCODONE HCL 5 MG/5 ML
10-15 SOLUTION, ORAL ORAL EVERY 4 HOURS PRN
Qty: 1350 ML | Refills: 0 | Status: SHIPPED | OUTPATIENT
Start: 2017-03-09 | End: 2017-04-11

## 2017-03-09 NOTE — TELEPHONE ENCOUNTER
Signed Prescriptions:                        Disp   Refills    fentaNYL (DURAGESIC) 50 mcg/hr 72 hr patch 15 pat*0        Sig: Place 1 patch onto the skin every 48 hours MYLAN           BRAND ONLY. Fill on/after 3/24/17 to start           on/after 3/27/17  Authorizing Provider: BRANDYN JENKINS    oxyCODONE (ROXICODONE) 5 MG/5ML solution   1350 mL0        Sig: Take 10-15 mLs (10-15 mg) by mouth every 4 hours as           needed for moderate to severe pain Max of 45mg           per day. Fill on/after 3/24/17 not to start till           3/26/17.  Authorizing Provider: BRANDYN JENKINS MD  [facility]

## 2017-03-09 NOTE — PROGRESS NOTES
Clinic Care Coordination Contact  D/I: This RN CC received call from patient/and wife, Rose. They were wondering about results of BC that had been drawn on the 7th/8th. Was able to tell them that skin culture was negative, no growth but that 2 blood cultures are pending, with no growth so far. Final reports are not back.   P: They will call back for update tomorrow or Monday.    Kyle KHAN,RN- BC  Clinic Care Coordinator  [facility]  Phone: [phone]

## 2017-03-09 NOTE — TELEPHONE ENCOUNTER
[employer] message from pt at 1407:    Parker needs a percription sent over to [facility] by March 24th 2017 for oxycodone and his fentynal patches those can be mailed over to be picked up on the 24th of March any questions feel free to call thanks   -----------------  Received call from patient requesting refill(s) of oxycodone and fentanyl patches     Last picked up from pharmacy: both Fentanyl 50 mcg patches and oxycodone 5 mg/5 ml, #1000 mls, picked up on 2/24/17. Oxycodone Rx dated 2/20/17 had directions for a temporary dose increase for 2 weeks, then a return to usual dosing.    Due date: fentanyl 3/27/17 and oxycodone between 3/24-3/26; there was a note that the Rx on 2/20 was not a full month supply but it appears that the supply will have lasted very close to a month based on the report on when they would like to  the medication.     Pt last seen on 11/14/16  Next appt scheduled for 4/5/17    Last urine drug screen 6/9/16  Current opioid agreement on file Yes Date: 11/14/16    Processing (pick one):  Mail to  [facility]     Will facilitate refill.    Gabbi Heath, YADIRAN, RN-BC  Patient Care Supervisor/Care Coordinator  [facility]

## 2017-03-09 NOTE — TELEPHONE ENCOUNTER
Reason for call:  Form  Reason for Call:  Form, our goal is to have forms completed with 72 hours, however, some forms may require a visit or additional information.    Type of letter, form or note:  medical    Who is the form from?:  [facility]    Where did the form come from: form was faxed in    What clinic location was the form placed at?: [facility] - [phone]    Where the form was placed: 's Box    What number is listed as a contact on the form?:  [phone]       Additional comments:  Fax to [phone]    created by Balbina Lindajohn

## 2017-03-10 NOTE — TELEPHONE ENCOUNTER
ARIEL. Script for fentanyl and oxycodone was signed and mailed out to [facility].       Lyndsay Solo MA

## 2017-03-10 NOTE — TELEPHONE ENCOUNTER
From chart review, a provider from a different clinic gave orders for blood cultures.  Astrid Saucedo RN

## 2017-03-12 ENCOUNTER — APPOINTMENT (OUTPATIENT)
Dept: GENERAL RADIOLOGY | Facility: CLINIC | Age: 47
End: 2017-03-12
Attending: EMERGENCY MEDICINE
Payer: COMMERCIAL

## 2017-03-12 ENCOUNTER — HOSPITAL ENCOUNTER (EMERGENCY)
Facility: CLINIC | Age: 47
Discharge: HOME | End: 2017-03-12
Attending: EMERGENCY MEDICINE | Admitting: EMERGENCY MEDICINE
Payer: COMMERCIAL

## 2017-03-12 VITALS
SYSTOLIC BLOOD PRESSURE: 124 MMHG | TEMPERATURE: 96.9 F | RESPIRATION RATE: 16 BRPM | HEART RATE: 77 BPM | OXYGEN SATURATION: 96 % | DIASTOLIC BLOOD PRESSURE: 85 MMHG

## 2017-03-12 DIAGNOSIS — R52 PAIN AT INJECTION SITE, SUBSEQUENT ENCOUNTER: ICD-10-CM

## 2017-03-12 DIAGNOSIS — T82.848A PAIN DUE TO VASCULAR PROSTHETIC DEVICES, IMPLANTS AND GRAFTS, INITIAL ENCOUNTER (H): ICD-10-CM

## 2017-03-12 DIAGNOSIS — T80.89XD PAIN AT INJECTION SITE, SUBSEQUENT ENCOUNTER: ICD-10-CM

## 2017-03-12 LAB
ANION GAP SERPL CALCULATED.3IONS-SCNC: 10 MMOL/L (ref 3–14)
BASOPHILS # BLD AUTO: 0 10E9/L (ref 0–0.2)
BASOPHILS NFR BLD AUTO: 0.5 %
BUN SERPL-MCNC: 9 MG/DL (ref 7–30)
CALCIUM SERPL-MCNC: 8.1 MG/DL (ref 8.5–10.1)
CHLORIDE SERPL-SCNC: 110 MMOL/L (ref 94–109)
CO2 BLDCOV-SCNC: 25 MMOL/L (ref 21–28)
CO2 SERPL-SCNC: 25 MMOL/L (ref 20–32)
CREAT SERPL-MCNC: 0.75 MG/DL (ref 0.66–1.25)
CRP SERPL-MCNC: 3.4 MG/L (ref 0–8)
DIFFERENTIAL METHOD BLD: ABNORMAL
EOSINOPHIL # BLD AUTO: 0.3 10E9/L (ref 0–0.7)
EOSINOPHIL NFR BLD AUTO: 4.5 %
ERYTHROCYTE [DISTWIDTH] IN BLOOD BY AUTOMATED COUNT: 14.1 % (ref 10–15)
ERYTHROCYTE [SEDIMENTATION RATE] IN BLOOD BY WESTERGREN METHOD: 17 MM/H (ref 0–15)
GFR SERPL CREATININE-BSD FRML MDRD: ABNORMAL ML/MIN/1.7M2
GLUCOSE SERPL-MCNC: 84 MG/DL (ref 70–99)
HCT VFR BLD AUTO: 34.1 % (ref 40–53)
HGB BLD-MCNC: 11.4 G/DL (ref 13.3–17.7)
IMM GRANULOCYTES # BLD: 0 10E9/L (ref 0–0.4)
IMM GRANULOCYTES NFR BLD: 0.3 %
INR PPP: 1.25 (ref 0.86–1.14)
LACTATE BLD-SCNC: <0.3 MMOL/L (ref 0.7–2.1)
LYMPHOCYTES # BLD AUTO: 2.5 10E9/L (ref 0.8–5.3)
LYMPHOCYTES NFR BLD AUTO: 41.7 %
MCH RBC QN AUTO: 31.1 PG (ref 26.5–33)
MCHC RBC AUTO-ENTMCNC: 33.4 G/DL (ref 31.5–36.5)
MCV RBC AUTO: 93 FL (ref 78–100)
MONOCYTES # BLD AUTO: 0.7 10E9/L (ref 0–1.3)
MONOCYTES NFR BLD AUTO: 11.7 %
NEUTROPHILS # BLD AUTO: 2.5 10E9/L (ref 1.6–8.3)
NEUTROPHILS NFR BLD AUTO: 41.3 %
NRBC # BLD AUTO: 0 10*3/UL
NRBC BLD AUTO-RTO: 0 /100
PCO2 BLDV: 43 MM HG (ref 40–50)
PH BLDV: 7.37 PH (ref 7.32–7.43)
PLATELET # BLD AUTO: 135 10E9/L (ref 150–450)
PO2 BLDV: 35 MM HG (ref 25–47)
POTASSIUM SERPL-SCNC: 3.3 MMOL/L (ref 3.4–5.3)
RBC # BLD AUTO: 3.67 10E12/L (ref 4.4–5.9)
SAO2 % BLDV FROM PO2: 64 %
SODIUM SERPL-SCNC: 145 MMOL/L (ref 133–144)
WBC # BLD AUTO: 6 10E9/L (ref 4–11)

## 2017-03-12 PROCEDURE — 80048 BASIC METABOLIC PNL TOTAL CA: CPT | Performed by: EMERGENCY MEDICINE

## 2017-03-12 PROCEDURE — 85610 PROTHROMBIN TIME: CPT | Performed by: EMERGENCY MEDICINE

## 2017-03-12 PROCEDURE — 25000128 H RX IP 250 OP 636

## 2017-03-12 PROCEDURE — 86140 C-REACTIVE PROTEIN: CPT | Performed by: EMERGENCY MEDICINE

## 2017-03-12 PROCEDURE — 71020 XR CHEST 2 VW: CPT

## 2017-03-12 PROCEDURE — 85025 COMPLETE CBC W/AUTO DIFF WBC: CPT | Performed by: EMERGENCY MEDICINE

## 2017-03-12 PROCEDURE — 99284 EMERGENCY DEPT VISIT MOD MDM: CPT | Mod: Z6 | Performed by: EMERGENCY MEDICINE

## 2017-03-12 PROCEDURE — 25000125 ZZHC RX 250

## 2017-03-12 PROCEDURE — 25000132 ZZH RX MED GY IP 250 OP 250 PS 637: Performed by: EMERGENCY MEDICINE

## 2017-03-12 PROCEDURE — 85652 RBC SED RATE AUTOMATED: CPT | Performed by: EMERGENCY MEDICINE

## 2017-03-12 PROCEDURE — 83605 ASSAY OF LACTIC ACID: CPT

## 2017-03-12 PROCEDURE — 36415 COLL VENOUS BLD VENIPUNCTURE: CPT | Performed by: EMERGENCY MEDICINE

## 2017-03-12 PROCEDURE — 99284 EMERGENCY DEPT VISIT MOD MDM: CPT | Performed by: EMERGENCY MEDICINE

## 2017-03-12 PROCEDURE — 87040 BLOOD CULTURE FOR BACTERIA: CPT | Performed by: EMERGENCY MEDICINE

## 2017-03-12 PROCEDURE — 82803 BLOOD GASES ANY COMBINATION: CPT

## 2017-03-12 RX ORDER — HEPARIN SODIUM (PORCINE) LOCK FLUSH IV SOLN 100 UNIT/ML 100 UNIT/ML
5 SOLUTION INTRAVENOUS
Status: DISCONTINUED | OUTPATIENT
Start: 2017-03-12 | End: 2017-03-12 | Stop reason: HOSPADM

## 2017-03-12 RX ORDER — ONDANSETRON 4 MG/1
TABLET, ORALLY DISINTEGRATING ORAL
Status: COMPLETED
Start: 2017-03-12 | End: 2017-03-12

## 2017-03-12 RX ORDER — ONDANSETRON 4 MG/1
4 TABLET, ORALLY DISINTEGRATING ORAL ONCE
Status: COMPLETED | OUTPATIENT
Start: 2017-03-12 | End: 2017-03-12

## 2017-03-12 RX ORDER — HEPARIN SODIUM (PORCINE) LOCK FLUSH IV SOLN 100 UNIT/ML 100 UNIT/ML
SOLUTION INTRAVENOUS
Status: COMPLETED
Start: 2017-03-12 | End: 2017-03-12

## 2017-03-12 RX ORDER — LIDOCAINE 40 MG/G
CREAM TOPICAL
Status: DISCONTINUED
Start: 2017-03-12 | End: 2017-03-12 | Stop reason: WASHOUT

## 2017-03-12 RX ORDER — OXYCODONE HCL 5 MG/5 ML
10 SOLUTION, ORAL ORAL EVERY 4 HOURS PRN
Status: DISCONTINUED | OUTPATIENT
Start: 2017-03-12 | End: 2017-03-12 | Stop reason: HOSPADM

## 2017-03-12 RX ORDER — LIDOCAINE/PRILOCAINE 2.5 %-2.5%
1 CREAM (GRAM) TOPICAL ONCE
Status: DISCONTINUED | OUTPATIENT
Start: 2017-03-12 | End: 2017-03-12 | Stop reason: HOSPADM

## 2017-03-12 RX ADMIN — ONDANSETRON 4 MG: 4 TABLET, ORALLY DISINTEGRATING ORAL at 03:17

## 2017-03-12 RX ADMIN — HEPARIN 5 ML: 100 SYRINGE at 04:42

## 2017-03-12 RX ADMIN — HEPARIN SODIUM (PORCINE) LOCK FLUSH IV SOLN 100 UNIT/ML 5 ML: 100 SOLUTION at 04:42

## 2017-03-12 RX ADMIN — OXYCODONE HYDROCHLORIDE 10 MG: 5 SOLUTION ORAL at 03:13

## 2017-03-12 ASSESSMENT — ENCOUNTER SYMPTOMS
HEADACHES: 0
DYSURIA: 0
NERVOUS/ANXIOUS: 0
FEVER: 0
ABDOMINAL PAIN: 0
COUGH: 0
TROUBLE SWALLOWING: 0
PALPITATIONS: 0
BRUISES/BLEEDS EASILY: 0
POLYDIPSIA: 0
VOMITING: 0
EYE PAIN: 0
COLOR CHANGE: 0
DIZZINESS: 0
ADENOPATHY: 0
SORE THROAT: 0
CHILLS: 0
FREQUENCY: 0
NUMBNESS: 0
NAUSEA: 0
LIGHT-HEADEDNESS: 0
DIAPHORESIS: 0
SHORTNESS OF BREATH: 0
VOICE CHANGE: 0
DYSPHORIC MOOD: 0
HEMATURIA: 0
WEAKNESS: 0
WOUND: 0

## 2017-03-12 NOTE — ED PROVIDER NOTES
Addendum:  Parker Acevedo Sr is a 46 year old male who presents today for concern for wound infection.  Patient was already seen and assessed and signed out pending lab workup to look for any inflammatory marker elevation.  Shortly after sign out patient wishes to go home and not wait for his lab results.  He states he was feeling fine and had a follow-up appointment and rather not wait for the results.  Chest x-ray been done and showed no abnormality of the lungs or cardiac acutely.  Patient had stable vital signs while in the ER and labs returning so far showed no leukocytosis and unremarkable chemistry at baseline for the patient.  Patient also had blood cultures drawn and these are pending.  ESR did come back slightly elevated however given the lack of leukocytosis, fever, tachycardia wound infection still not found.  Patient will have blood cultures followed up on and will follow up with his primary doctor.     Guy Edwards MD  03/12/17 8945

## 2017-03-12 NOTE — ED NOTES
Pt reports he had cultures and labs drawn by [facility] today. Dr. Hicks informed and will go talk to him

## 2017-03-12 NOTE — ED PROVIDER NOTES
History     Chief Complaint   Patient presents with     Wound Infection     HPI  Parker Acevedo Sr is a 46 year old male with a history of ADHD, cardiomyopathy, chronic abdominal pain, GERD, hiatal hernia, short gut syndrome, and malnutrition on TPN, s/p Celestino-en-Y gastric bypass and multiple other surgeries who presents for evaluation of his port site.     Patient and his significant other are concerned the patient's port may be infected and he does complain of significant pain with palpation of his port site (only location of discomfort, particularly uncomfortable with use). Patient's current port was placed in November 2016. No temperatures above 100. No chest breathing symptoms, no palpitations, no breathing symptoms. No new leg pain or swelling. No cough/no sputum production. No vomiting or new GI/ symptoms.No other symptoms or complaints at this time. Please see ROS for further details.     Of note, per chart review patient's last blood cultures from 3/8/17 were negative.     I have reviewed the Medications, Allergies, Past Medical and Surgical History, and Social History in the [employer] system.  Past Medical History   Diagnosis Date     ADHD (attention deficit hyperactivity disorder)      Anxiety      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)      mild EF ~45% on rest 2/13/17, improves with stressing     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)      Chronic abdominal pain      Difficulty swallowing      Gastric ulcer, unspecified as acute or chronic, without mention of hemorrhage, perforation, or obstruction      Gastro-oesophageal reflux disease      Head injury      Hiatal hernia      Other bladder disorder      Other chronic pain      Severe malnutrition (H)      TPN     Short gut syndrome      Tobacco abuse        Past Surgical History   Procedure Laterality Date     Hernia repair  2006     Umbilical hernia     Endoscopy  03/25/11     EGD, [facility]     Endoscopy  08/04/09     Upper  Endoscopy, [facility]     Endoscopy  01/05/09     Upper Endoscopy, [facility]     Herniorrhaphy hiatal  6/22/2012     Procedure: HERNIORRHAPHY HIATAL;;  Surgeon: Francis Vyas MD;  Location: [facility] OR     Gm en y bowel  2003     Cholecystectomy       Gastrojejunostomy  08/26/09     Extensice enterolysis, partial resect. jejunum, part. resect gastric pouch, gastrojejunostomy anastomosis     Gastrectomy  6/22/2012     Procedure: GASTRECTOMY;  Open Approach, Excise Ulcers,Partial Gastrectomy, Esophagojejunostomy, Hiatal Hernia Repair, Extensive Lysis of Adhesions and Esaphagogastrodudenoscopy.;  Surgeon: Francis Vyas MD;  Location: [facility] OR     Tonsillectomy       C gastric bypass,obese<100cm gm-en-y  2002     lost 300 pounds     Esophagoscopy, gastroscopy, duodenoscopy (egd), combined  4/20/2011     Procedure:COMBINED ESOPHAGOSCOPY, GASTROSCOPY, DUODENOSCOPY (EGD); Surgeon:FRANCIS VYAS; Location: GI     Endoscopic ultrasound upper gastrointestinal tract (gi)  4/29/2011     Procedure:ENDOSCOPIC ULTRASOUND UPPER GASTROINTESTINAL TRACT (GI); Both Procedures done Conjointly; Surgeon:NEREIDA HOUSER; Location: OR     Esophagoscopy, gastroscopy, duodenoscopy (egd), combined  6/15/2011     Procedure:COMBINED ESOPHAGOSCOPY, GASTROSCOPY, DUODENOSCOPY (EGD); Surgeon:FRANCIS VYAS; Location: GI     Esophagoscopy, gastroscopy, duodenoscopy (egd), combined  6/12/2013     Procedure: COMBINED ESOPHAGOSCOPY, GASTROSCOPY, DUODENOSCOPY (EGD);;  Surgeon: Francis Vyas MD;  Location:  GI     Hc esoph/gas reflux test w nasal imped electrode  8/5/2013     Procedure: ESOPHAGEAL IMPEDENCE FUNCTION TEST 1 HOUR OR LESS;  Surgeon: Halie Lang MD;  Location: [facility] GI     Endoscopic ultrasound upper gastrointestinal tract (gi)  9/9/2013     Procedure: ENDOSCOPIC ULTRASOUND UPPER GASTROINTESTINAL TRACT (GI);  Endoscopic Ultrasound Guide Gastrostomy Tube Placement  C-arm;   Surgeon: Noe Lizarraga MD;  Location: [facility] OR     Endoscopic insertion tube gastrostomy  9/9/2013     Procedure: ENDOSCOPIC INSERTION TUBE GASTROSTOMY;;  Surgeon: Francis Vyas MD;  Location: [facility] OR     Laparotomy exploratory  11/22/2013     Procedure: LAPAROTOMY EXPLORATORY;  Exploratory Laparotomy, Upper Endoscopy, Left Inguinal Hernia Repair;  Surgeon: Francis Vyas MD;  Location: [facility] OR     Herniorrhaphy inguinal  11/22/2013     Procedure: HERNIORRHAPHY INGUINAL;;  Surgeon: Francis Vyas MD;  Location: [facility]     Esophagoscopy, gastroscopy, duodenoscopy (egd), combined  11/22/2013     Procedure: COMBINED ESOPHAGOSCOPY, GASTROSCOPY, DUODENOSCOPY (EGD);;  Surgeon: Francis Vyas MD;  [facility]: [facility]     Esophagoscopy, gastroscopy, duodenoscopy (egd), combined  4/30/2014     Procedure: COMBINED ESOPHAGOSCOPY, GASTROSCOPY, DUODENOSCOPY (EGD);  Surgeon: Francis Vyas MD;  Location: [facility] GI     Appendectomy       Soft tissue surgery       Back surgery  11/3/2014     curve in the spine     Biopsy lymph node cervical N/A 2/20/2015     Procedure: BIOPSY LYMPH NODE CERVICAL;  Surgeon: Baron Scanlon MD;  Location: [facility] OR     Esophagoscopy, gastroscopy, duodenoscopy (egd), combined N/A 2/20/2015     Procedure: COMBINED ESOPHAGOSCOPY, GASTROSCOPY, DUODENOSCOPY (EGD), BIOPSY SINGLE OR MULTIPLE;  Surgeon: Baron Scanlon MD;  Location: [facility] OR     Soft tissue surgery       Esophagoscopy, gastroscopy, duodenoscopy (egd), combined N/A 9/30/2015     Procedure: COMBINED ESOPHAGOSCOPY, GASTROSCOPY, DUODENOSCOPY (EGD);  Surgeon: Francis Vyas MD;  Location: [facility] GI     Discectomy, fusion cervical anterior one level, combined N/A 2/15/2017     Procedure: COMBINED DISCECTOMY, FUSION CERVICAL ANTERIOR ONE LEVEL;  Surgeon: Darren Campos MD;  Location: [facility] OR       Family History   Problem Relation Age of Onset     GASTROINTESTINAL DISEASE Mother      Crohns disease     Anxiety Disorder  "Mother      Thyroid Disease Mother      Grave's disease     CANCER Father      ear cancer-skin cancer/melanoma     Breast Cancer Maternal Grandmother      DIABETES Maternal Uncle      Breast Cancer Other      Hypertension No family hx of      Hyperlipidemia No family hx of      CEREBROVASCULAR DISEASE No family hx of      Prostate Cancer No family hx of      Depression No family hx of      Anesthesia Reaction No family hx of      Asthma No family hx of      OSTEOPOROSIS No family hx of      Genetic Disorder No family hx of      Obesity No family hx of      MENTAL ILLNESS No family hx of      Substance Abuse No family hx of        Social History   Substance Use Topics     Smoking status: Light Tobacco Smoker     Packs/day: 0.10     Years: 3.00     Types: Cigarettes     Smokeless tobacco: Current User      Comment: I use an e cig every now and than     Alcohol use No      Comment: quit      No current facility-administered medications for this encounter.      Current Outpatient Prescriptions   Medication     fentaNYL (DURAGESIC) 50 mcg/hr 72 hr patch     oxyCODONE (ROXICODONE) 5 MG/5ML solution     lidocaine-prilocaine (EMLA) cream     morphine 0.1% in intrasite topical gel     Needle, Disp, (BD DISP NEEDLES) 27G X 1/2\" MISC     ondansetron (ZOFRAN-ODT) 8 MG ODT tab     vitamin D (ERGOCALCIFEROL) 66635 UNIT capsule     sucralfate (CARAFATE) 1 GM/10ML suspension     diazepam (VALIUM) 5 MG tablet     cyanocobalamin (VITAMIN B12) 1000 MCG/ML injection     amphetamine-dextroamphetamine (ADDERALL) 20 MG per tablet     amphetamine-dextroamphetamine (ADDERALL) 20 MG per tablet     amphetamine-dextroamphetamine (ADDERALL) 20 MG per tablet     dextrose 1,000 mL with sodium chloride 76.92 mEq solution     nystatin-triamcinolone (MYCOLOG II) cream     [facility]NA[facility] PATIENT     mupirocin (BACTROBAN) 2 % ointment     polyethylene glycol (MIRALAX) powder     order for [facility] PATIENT "     senna-docusate (SENOKOT-S;PERICOLACE) 8.6-50 MG per tablet     order for DME     [DISCONTINUED] NO ACTIVE MEDICATIONS     Facility-Administered Medications Ordered in Other Encounters   Medication     DOBUTamine 500 mg in dextrose 5% 250 mL (adult std)        Allergies   Allergen Reactions     Penicillins Anaphylaxis     Doxycycline Rash     Vancomycin Rash     Rash after receiving vancomycin 3/28/16 (red man's?). Tolerated with slower infusion and diphenhydramine premed.       Review of Systems   Constitutional: Negative for chills, diaphoresis and fever (no temps above 100).   HENT: Negative for ear pain, sore throat, tinnitus, trouble swallowing and voice change.    Eyes: Negative for pain and visual disturbance.   Respiratory: Negative for cough and shortness of breath.    Cardiovascular: Negative for chest pain, palpitations and leg swelling.   Gastrointestinal: Negative for abdominal pain, nausea and vomiting.   Endocrine: Negative for polydipsia and polyuria.   Genitourinary: Negative for dysuria, frequency, hematuria and urgency.   Skin: Negative for color change, rash and wound.        Pain @ port site   Allergic/Immunologic: Negative for immunocompromised state.   Neurological: Negative for dizziness, weakness, light-headedness, numbness and headaches.   Hematological: Negative for adenopathy. Does not bruise/bleed easily.   Psychiatric/Behavioral: Negative for dysphoric mood. The patient is not nervous/anxious.        Physical Exam   BP: 121/81  Pulse: 68  Temp: 96.9  F (36.1  C)  Resp: 20  SpO2: 99 %  Physical Exam  CONSTITUTIONAL: Well-developed and well-nourished. Awake and alert. Non-toxic appearance. No acute distress.   HENT:   - Head: Normocephalic and atraumatic.   - Ears: Hearing and external ear grossly normal.   - Nose: Nose normal. No rhinorrhea. No epistaxis.   - Mouth/Throat: Oropharynx is clear and moist and mucous membranes are normal.   EYES: Conjunctivae and lids are normal. No  scleral icterus.   NECK: Phonation normal. Neck supple. No stridor.  CARDIOVASCULAR: Normal rate, regular rhythm and no appreciable abnormal heart sounds.  PULMONARY/CHEST: Effort normal. No accessory muscle usage or stridor. No respiratory distress.  No appreciable abnormal breath sounds. Port site has overlying scar but no abnormal warmth, erythema. No induration, fullness or fluctuance. All diffusely tendern to palpation even with the faintest of touch.   ABDOMEN: Soft, non-distended. No tenderness. No rigidity, rebound or guarding.   MUSCULOSKELETAL: Extremities warm and seemingly well perfused. No edema or calf tenderness.  NEUROLOGIC: Awake, alert. Not disoriented. He displays no atrophy and no tremor. Normal tone. No seizure activity. Coordination normal. GCS 15  SKIN: Skin is warm and dry. No rash noted. No diaphoresis. No pallor.   PSYCHIATRIC: Normal mood and affect. Speech and behavior normal. Thought processes linear. Cognition and memory are normal.     ED Course   1:47 AM  The patient was seen and examined by Dr. Hicks in Room 6.     ED Course     Procedures        Labs Ordered and Resulted from Time of ED Arrival Up to the Time of Departure from the ED - No data to display    Assessments & Plan (with Medical Decision Making)   IMPRESSION: 45 yo M, PMH as above, presents for evaluation of pain at the port site, though he says he himself is not concerned about this, unclear why presenting tonight given ongoing symptoms (as opposed to earlier or later), seems like not necessarily new symptoms, but more perhaps ongoing pain. Clinically looks well. No external findings for infections, but quite uncomfortable to even the faintest of touch, already on chronic narcotics (fentanyl patch, regular oxycodone). Pain is only at the port and only with touch or use, no other cardiopulmonary symptoms and no abnormalities on exam in that regard. Ongoing for weeks, no findings for PNA, PTX, does not sound consistient  with PE, dissection, MW or boerhaave. Not consistent with ACS. Will look for infection, check CXR to ensure no broken line, etc. If not acute findings, will have F/U as outpatient.     PLAN: Labs, cultures, CXR    INTERVENTIONS:   - EMLA   - PO Oxycodone (home dose)    SIGNOUT:  Patient signed out to overnight EM MD  - Impression at shift change: Pain at port site w/o outward evidence for infection  - Pending at shift change: CXR, Labs, cultures  - Tentative plan: If no immediate findings, D/C to home to F/U with PCP. If marked abnormalities, manage accordingly.         New Prescriptions    No medications on file       Final diagnoses:   None   Lauren NJ, am serving as a trained medical scribe to document services personally performed by Fifi Hicks MD, based on the provider's statements to me.   Fifi NJ MD, was physically present and have reviewed and verified the accuracy of this note documented by Lauren Hunter.      3/12/2017   [facility], [place], EMERGENCY DEPARTMENT     Fifi Hicks MD  03/12/17 1227

## 2017-03-12 NOTE — DISCHARGE INSTRUCTIONS
TODAY'S VISIT:  You were seen today for pain at your Port site.  - Your evaluation so far today has been grossly reassuring and reviewed.  Recent laboratory testing/blood cultures have also been reassuring.We do have new cultures pending from today that your regular provider can follow-up.     FOLLOW-UP:  Please make an appointment to follow up with Your Primary Care Provider as soon as possible.    PRESCRIPTIONS / MEDICATIONS:  - No new changes recommended.     OTHER INSTRUCTIONS:  - Good site cares.     RETURN TO THE EMERGENCY DEPARTMENT  Return to the Emergency Department at any time for new/worsening symptoms.       Discharge Instructions: Caring for Your Central Line  You are going home with a central line. It s also called a central venous access device (CVAD) or central venous catheter (CVC). A small, soft tube (catheter) has been put in a vein that leads to your heart. This provides medication during treatment, and is taken out when you no longer need it. At home, you need to take care of your central line to keep it working. And since a central line has a high infection risk, you must take extra care washing your hands and preventing the spread of germs. This sheet will help you remember what to do at home.    Understanding your role    A nurse or other health care provider will teach you and your caregivers how to care for the central line. Before leaving the hospital, make sure you understand what to do at home, how long you may need the central line, and when to have a follow-up visit.    You will likely be told to flush the central line with saline or heparin solution. You may also be told to change the catheter s injection caps and change the dressing (bandage). Or, a nurse may do this for you during a follow-up visit. Only do these things if you re told to, following the instructions you were given.  Protecting the central line  If the central line gets damaged, it won t work right and could raise  your chance of infection. Call your health care team right away if any damage occurs. To protect the central line at home:    Prevent infection. Use good hand hygiene by following the guidelines on this sheet. Don t touch the catheter or dressing unless you need to. And always clean your hands before and after you come in contact with any part of the central line. Your caregivers, family members, and any visitors should use good hand hygiene, too.    Keep the central line dry. The catheter and dressing must stay dry. Don t take baths, go swimming, use a hot tub, or do other activities that could get the central line wet. Take a sponge bath to avoid getting the central line wet, unless your healthcare provider tells you otherwise. Ask your provider about the best way to keep the line dry when bathing or showering. If the dressing does get wet, change it only if you have been shown how. Otherwise, call your health care team right away for help.    Avoid damage. Don t use any sharp or pointy objects around the catheter. This includes scissors, pins, knives, razors, or anything else that could puncture or cut it. Also, don t let anything pull or rub on the catheter, such as clothing.    Watch for signs of problems. Pay attention to how much of the catheter sticks out from your skin. If this changes at all, let your health care provider know. Also watch for cracks, leaks, or other damage. And if the dressing becomes dirty, loose, or wet, change it (if you have been instructed to) or call your healthcare team right away.    Avoid lowering your chest below your waist. This includes bending at the waist for actions like tying your shoes. When your chest is positioned below your waist, especially for a long time, the catheter s internal tip could slip out of place in the vein.    Tell your health care team if you vomit or have severe coughing. This can also make the catheter slip out of place.  Risk of blood clot  If a blood  clot forms it can block blood flow through the vein where the catheter is placed. Signs of a blood clot include pain or swelling in the neck, face, chest or arm. If you have any of these symptoms, call your healthcare provider right away. You may need an ultrasound exam to locate the blood clot and receive treatment with a blood thinner.    Prevent infection with good hand hygiene  A central line can let germs into your body. This can lead to serious and sometimes deadly infections. To prevent infection, it s very important that you, your caregivers, and others around you use good hand hygiene. This means washing your hands well with soap and water, and cleaning them with alcohol-based hand gel as directed. Never touch the central line or dressing without first using one of these methods.  To wash your hands with soap and water:  1. Wet your hands with warm water. (Avoid hot water, which can cause skin irritation when you wash your hands often.)  2. Apply enough soap to cover the entire surface of your hands, including your fingers.  3. Rub your hands together vigorously for at least 15 seconds. Make sure to rub the front and back of each hand up to the wrist, your fingers and fingernails, between the fingers, and each thumb.  4. Rinse your hands with warm water.  5. Dry your hands completely with a new, unused paper towel. Don t use a cloth towel or other reusable towel. These can harbor germs.  6. Use the paper towel to turn off the faucet, then throw it away. If you re in a bathroom, also use a paper towel to open the door instead of touching the handle.  When you don t have access to soap and water: Use alcohol-based hand gel to clean your hands. The gel should have at least 60% alcohol. Follow the instructions on the package. Your healthcare team can answer any questions you have about when to use hand gel, or when it s better to wash with soap and water.   When to seek medical care  Call your provider right  away if you have any of the following:    Pain or burning in your shoulder, chest, back, arm, or leg    Fever of 100.4 F (38.0 C) or higher    Chills    Signs of infection at the catheter site (pain, redness, drainage, burning, or stinging)    Coughing, wheezing, or shortness of breath    A racing or irregular heartbeat    Muscle stiffness or trouble moving    Gurgling noises coming from the catheter    The catheter falls out, breaks, cracks, leaks, or has other damage     6061-0894 [employer]. [address] [place] [address]. All rights reserved. This information is not intended as a substitute for professional medical care. Always follow your healthcare professional's instructions.

## 2017-03-12 NOTE — ED AVS SNAPSHOT
[facility], [place], Emergency Department    [address] [place] [id number]    Phone:  [phone]                                       Parker Acevedo Sr   MRN: [id number]    Department:  [facility], Emergency Department   Date of Visit:  3/12/2017           After Visit Summary Signature Page     I have received my discharge instructions, and my questions have been answered. I have discussed any challenges I see with this plan with the nurse or doctor.    ..........................................................................................................................................  Patient/Patient Representative Signature      ..........................................................................................................................................  Patient Representative Print Name and Relationship to Patient    ..................................................               ................................................  Date                                            Time    ..........................................................................................................................................  Reviewed by Signature/Title    ...................................................              ..............................................  Date                                                            Time

## 2017-03-13 ENCOUNTER — MYC MEDICAL ADVICE (OUTPATIENT)
Dept: INFECTIOUS DISEASES | Facility: CLINIC | Age: 47
End: 2017-03-13

## 2017-03-13 ENCOUNTER — CARE COORDINATION (OUTPATIENT)
Dept: CARE COORDINATION | Facility: CLINIC | Age: 47
End: 2017-03-13

## 2017-03-13 LAB
BACTERIA SPEC CULT: NO GROWTH
MICRO REPORT STATUS: NORMAL
SPECIMEN SOURCE: NORMAL

## 2017-03-13 NOTE — PROGRESS NOTES
Clinic Care Coordination Contact  [facility]/Voicemail    Referral Source: ED Report  Clinical Data: Care Coordinator Outreach  Outreach attempted x 1.  Left message on voicemail with call back information and requested return call.  Plan: Care Coordinator mailed out care coordination introduction letter on 10/3/16. Care Coordinator will try to reach patient again in 1-2 business days.  Kyle KHAN,RN- BC  Clinic Care Coordinator  [facility]  Phone: [phone]

## 2017-03-14 ENCOUNTER — TELEPHONE (OUTPATIENT)
Dept: FAMILY MEDICINE | Facility: CLINIC | Age: 47
End: 2017-03-14

## 2017-03-14 ENCOUNTER — MEDICAL CORRESPONDENCE (OUTPATIENT)
Dept: HEALTH INFORMATION MANAGEMENT | Facility: CLINIC | Age: 47
End: 2017-03-14

## 2017-03-14 DIAGNOSIS — L03.90 CELLULITIS, UNSPECIFIED CELLULITIS SITE: Primary | ICD-10-CM

## 2017-03-14 DIAGNOSIS — K90.829 SHORT GUT SYNDROME: Primary | ICD-10-CM

## 2017-03-14 LAB
BACTERIA SPEC CULT: NO GROWTH
Lab: NORMAL
MICRO REPORT STATUS: NORMAL
SPECIMEN SOURCE: NORMAL

## 2017-03-14 RX ORDER — SULFAMETHOXAZOLE AND TRIMETHOPRIM 200; 40 MG/5ML; MG/5ML
20 SUSPENSION ORAL 2 TIMES DAILY
Qty: 560 ML | Refills: 0 | Status: SHIPPED | OUTPATIENT
Start: 2017-03-14 | End: 2017-03-16

## 2017-03-14 NOTE — NURSING NOTE
Per message from Dr. Thompson, pt needs order for PICC line placement for continuation of TPN.  Astrid Saucedo RN

## 2017-03-14 NOTE — NURSING NOTE
Appt made for 1:30 PM with vascular access at [facility]. Called pt's wife Rose and let her know.  Astrid Saucedo RN

## 2017-03-14 NOTE — TELEPHONE ENCOUNTER
Reason for call:  Form  Reason for Call:  Form, our goal is to have forms completed with 72 hours, however, some forms may require a visit or additional information.    Type of letter, form or note:  medical    Who is the form from?:  [facility]    Where did the form come from: form was faxed in    What clinic location was the form placed at?: [facility] - [phone]    Where the form was placed: 's Box    What number is listed as a contact on the form?:  [phone]       Additional comments:  Fax to [phone]    created by Balbina Lindajohn

## 2017-03-14 NOTE — PROGRESS NOTES
"Clinic Care Coordination Contact  Care Team Conversations      Clinical Data: RN CC called to f/u with pt after recent ED visit. There is concern for an infection in his port. BC have been negative. Pt has been experiencing low grade temps off and on, stinging with use of port, slight redness with \"yellow heads\" around port site and generalized fatigue. [facility] is placing PIV for IVF (continued to see him for Port maintenance and health maintenance with TPN). Wife has been in contact with ID and has an apt for pt on Thursday. Wife has done a great job coordinating care and apts.     New medications to review:  Started on bactrium/septra by ID yesterday    Clinical Pathway Documentation: NA    Goals/Progress (add to goals section): NA    Resources Given: encouraged wife to call if needed    Plan:   1. Continue to f/u with home care and attend apt with ID on Thursday.   2. RN CC will f/u with pt/wfie again in 1-2 weeks.     Jyothi Byrd, RN-BSN  RN Clinic Care Coordinator  [facility]  [phone]                    "

## 2017-03-14 NOTE — PROGRESS NOTES
Mr. Acevedo has been having some irritation over his port site for the last 2 weeks.  He also reports that he has had fevers up to 100.4, small pustules formed at the site of the port and a burning sensation when it is accessed.  I am not the primary physician in charge of this line which is used for TPN, but per his wife, the decision was made by [facility] not to use it and he is currently getting hydration through a PIV and not getting his TPN.  He contacted our clinic late last week - too late to be seen in clinic, and so I directed him to be seen in the ED for a possible port infection because if it was indeed infected, it would need removal.  The notes suggest that it did not appear infected.  Blood cultures and a CXR were taken and he was sent home.  Today, I called in follow up and his symptoms are ongoing.  I will have him come into clinic this Thursday and at that time, I will make an assessment as to whether the port need removal.  We will also place an appointment with IR that afternoon for possible port removal and PICC placement so TPN can be resumed.  In the setting of fevers, I will give him a trial of Bactrim to see if that leads to any improvement in the rash.  I advised Ms. Acevedo to take a photo of the site so I can see it on Thursday as it appears prior to antibiotics. I discussed with Ms. Acevedo the most common side effects of Bactrim.     Gabbi Thompson MD  Division of Infectious Diseases and International Medicine  [phone]

## 2017-03-16 ENCOUNTER — HOSPITAL ENCOUNTER (OUTPATIENT)
Dept: VASCULAR ULTRASOUND | Facility: CLINIC | Age: 47
Discharge: HOME | End: 2017-03-16
Attending: INTERNAL MEDICINE | Admitting: INTERNAL MEDICINE
Payer: COMMERCIAL

## 2017-03-16 ENCOUNTER — OFFICE VISIT (OUTPATIENT)
Dept: INFECTIOUS DISEASES | Facility: CLINIC | Age: 47
End: 2017-03-16
Attending: INTERNAL MEDICINE
Payer: COMMERCIAL

## 2017-03-16 ENCOUNTER — TELEPHONE (OUTPATIENT)
Dept: FAMILY MEDICINE | Facility: CLINIC | Age: 47
End: 2017-03-16

## 2017-03-16 VITALS
SYSTOLIC BLOOD PRESSURE: 132 MMHG | DIASTOLIC BLOOD PRESSURE: 72 MMHG | HEART RATE: 74 BPM | TEMPERATURE: 97.9 F | HEIGHT: 72 IN | WEIGHT: 194.4 LBS | BODY MASS INDEX: 26.33 KG/M2

## 2017-03-16 DIAGNOSIS — K90.829 SHORT GUT SYNDROME: ICD-10-CM

## 2017-03-16 DIAGNOSIS — L03.90 CELLULITIS, UNSPECIFIED CELLULITIS SITE: ICD-10-CM

## 2017-03-16 DIAGNOSIS — T80.212A PORT OR RESERVOIR INFECTION, INITIAL ENCOUNTER: Primary | ICD-10-CM

## 2017-03-16 DIAGNOSIS — K90.829 SHORT GUT SYNDROME: Primary | ICD-10-CM

## 2017-03-16 PROCEDURE — 99213 OFFICE O/P EST LOW 20 MIN: CPT | Mod: 25

## 2017-03-16 PROCEDURE — 36569 INSJ PICC 5 YR+ W/O IMAGING: CPT

## 2017-03-16 PROCEDURE — C1751 CATH, INF, PER/CENT/MIDLINE: HCPCS

## 2017-03-16 PROCEDURE — 25000125 ZZHC RX 250: Performed by: INTERNAL MEDICINE

## 2017-03-16 RX ORDER — SULFAMETHOXAZOLE AND TRIMETHOPRIM 200; 40 MG/5ML; MG/5ML
20 SUSPENSION ORAL 2 TIMES DAILY
Qty: 1120 ML | Refills: 0 | Status: SHIPPED | OUTPATIENT
Start: 2017-03-16 | End: 2017-04-13

## 2017-03-16 RX ADMIN — LIDOCAINE HYDROCHLORIDE 2 ML: 10 INJECTION, SOLUTION INFILTRATION; PERINEURAL at 13:41

## 2017-03-16 ASSESSMENT — PAIN SCALES - GENERAL: PAINLEVEL: SEVERE PAIN (6)

## 2017-03-16 NOTE — LETTER
3/16/2017       RE: Parker Acevedo Sr  [address] [facility] [id number]     Dear Colleague,    Thank you for referring your patient, Parker Acevedo Sr, to the [facility] AND INFECTIOUS DISEASES at [facility]. Please see a copy of my visit note below.    General acute hospital Clinic - Progress Note  Dr. Gabbi Thompson, [facility], [facility], [address] [place] [address], Sixth Floor, [address]  [place] [address]  Patient:  Parker Acevedo Sr, Date of birth 1970, Medical record number [id number]  Date of Visit: Mar 16, 2017         Assessment and Recommendations:     Suspected port-a-cath hub infection  We discussed today extensively our possible approaches. I let them know that ideally we remove the port and given him a course of antibiotics.  However, Mr. Acevedo has difficult access issues and has already required multiple port removals.  Their preference is to try to salvage the line and both Parker and his wife understand that there is an increased risk with this approach that he will not clear the infection including the risk for an intravascular infection.  We will plan to hold use of the line considering the pain, continue Bactrim for a total of 4 weeks.  We will have a PICC line placed for his TPN.  On discussion with his health care team, him and his wife, it sounds like 4 weeks is too long to be without TPN.     Gabbi Thompson MD  Division of Infectious Diseases and International Medicine  [phone]         History of Infectious Disease Illness:   Parker Acevedo Sr is a 46 year old man who I previously saw about a port infection.  At that time, he had positive blood cultures, he presented with fevers and he had his port removed and  A complete course of IV antibiotics.  He comes in today because he has developed a  lot of pain and irritation over the port site.  This included erythema and pustular lesions.  He was having low grade fevers but not full fever.  He was seen in the ED and he had blood cultures drawn.  He infusion team also contacted me and I discussed what was going on with Parker's wife a couple days ago.  At that time, I requested that she take photos of the site, stop TPN and come and see me.  I also started Bactrim therapy. The description sounded consistent with a hub infection, perhaps a small abscess.  They started this and he reports significant improvement since starting. No nausea or vomiting.  He is having some systemic weakness. No headache.         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)      Chronic abdominal pain      Difficulty swallowing      Gastric ulcer, unspecified as acute or chronic, without mention of hemorrhage, perforation, or obstruction      Gastro-oesophageal reflux disease      Head injury      Hiatal hernia      Other bladder disorder      Other chronic pain      Severe malnutrition (H)      TPN     Short gut syndrome      Tobacco abuse        Past Surgical History   Procedure Laterality Date     Hernia repair  2006     Umbilical hernia     Endoscopy  03/25/11     EGD, [facility]     Endoscopy  08/04/09     Upper Endoscopy, [facility]     Endoscopy  01/05/09     Upper Endoscopy, [facility]     Herniorrhaphy hiatal  6/22/2012     Procedure: HERNIORRHAPHY HIATAL;;  Surgeon: Francis Vyas MD;  Location: [facility] OR     Celestino en y bowel  2003     Cholecystectomy       Gastrojejunostomy  08/26/09     Extensice enterolysis, partial resect. jejunum, part. resect gastric pouch, gastrojejunostomy anastomosis      Gastrectomy  6/22/2012     Procedure: GASTRECTOMY;  Open Approach, Excise Ulcers,Partial Gastrectomy, Esophagojejunostomy, Hiatal Hernia Repair, Extensive Lysis of Adhesions and Esaphagogastrodudenoscopy.;  Surgeon: Francis Vyas MD;  Location: [facility] OR     Tonsillectomy       C gastric bypass,obese<100cm gm-en-y  2002     lost 300 pounds     Esophagoscopy, gastroscopy, duodenoscopy (egd), combined  4/20/2011     Procedure:COMBINED ESOPHAGOSCOPY, GASTROSCOPY, DUODENOSCOPY (EGD); Surgeon:FRANCIS VYAS; Location:[facility]     Endoscopic ultrasound upper gastrointestinal tract (gi)  4/29/2011     Procedure:ENDOSCOPIC ULTRASOUND UPPER GASTROINTESTINAL TRACT (GI); Both Procedures done Conjointly; Surgeon:NEREIDA HOUSER; Location[facility]     Esophagoscopy, gastroscopy, duodenoscopy (egd), combined  6/15/2011     Procedure:COMBINED ESOPHAGOSCOPY, GASTROSCOPY, DUODENOSCOPY (EGD); Surgeon:FRANCIS VYAS; [facility]     Esophagoscopy, gastroscopy, duodenoscopy (egd), combined  6/12/2013     Procedure: COMBINED ESOPHAGOSCOPY, GASTROSCOPY, DUODENOSCOPY (EGD);;  Surgeon: Francis Vyas MD;  Location:  GI     Hc esoph/gas reflux test w nasal imped electrode  8/5/2013     Procedure: ESOPHAGEAL IMPEDENCE FUNCTION TEST 1 HOUR OR LESS;  Surgeon: Halie Lang MD;  Location: [facility]     Endoscopic ultrasound upper gastrointestinal tract (gi)  9/9/2013     Procedure: ENDOSCOPIC ULTRASOUND UPPER GASTROINTESTINAL TRACT (GI);  Endoscopic Ultrasound Guide Gastrostomy Tube Placement  C-arm;  Surgeon: Noe Lizarraga MD;  Location: [facility]     Endoscopic insertion tube gastrostomy  9/9/2013     Procedure: ENDOSCOPIC INSERTION TUBE GASTROSTOMY;;  Surgeon: Francis Vyas MD;  [facility]     Laparotomy exploratory  11/22/2013     Procedure: LAPAROTOMY EXPLORATORY;  Exploratory Laparotomy, Upper Endoscopy, Left Inguinal Hernia Repair;  Surgeon: Francis Vyas MD;  [facility]: [facility]      Herniorrhaphy inguinal  11/22/2013     Procedure: HERNIORRHAPHY INGUINAL;;  Surgeon: Francis Vyas MD;  [facility]     Esophagoscopy, gastroscopy, duodenoscopy (egd), combined  11/22/2013     Procedure: COMBINED ESOPHAGOSCOPY, GASTROSCOPY, DUODENOSCOPY (EGD);;  Surgeon: Francis Vyas MD;  [facility]     Esophagoscopy, gastroscopy, duodenoscopy (egd), combined  4/30/2014     Procedure: COMBINED ESOPHAGOSCOPY, GASTROSCOPY, DUODENOSCOPY (EGD);  Surgeon: Francis Vyas MD;  Location: [facility]     Appendectomy       Soft tissue surgery       Back surgery  11/3/2014     curve in the spine     Biopsy lymph node cervical N/A 2/20/2015     Procedure: BIOPSY LYMPH NODE CERVICAL;  Surgeon: Baron Scanlon MD;  Location: [facility]     Esophagoscopy, gastroscopy, duodenoscopy (egd), combined N/A 2/20/2015     Procedure: COMBINED ESOPHAGOSCOPY, GASTROSCOPY, DUODENOSCOPY (EGD), BIOPSY SINGLE OR MULTIPLE;  Surgeon: Baron Scanlon MD;  Location: [facility]     Soft tissue surgery       Esophagoscopy, gastroscopy, duodenoscopy (egd), combined N/A 9/30/2015     Procedure: COMBINED ESOPHAGOSCOPY, GASTROSCOPY, DUODENOSCOPY (EGD);  Surgeon: Francis Vyas MD;  [facility]     Discectomy, fusion cervical anterior one level, combined N/A 2/15/2017     Procedure: COMBINED DISCECTOMY, FUSION CERVICAL ANTERIOR ONE LEVEL;  Surgeon: Darren Campos MD;  Location: [facility]           Family History:     Family History   Problem Relation Age of Onset     GASTROINTESTINAL DISEASE Mother      Crohns disease     Anxiety Disorder Mother      Thyroid Disease Mother      Grave's disease     CANCER Father      ear cancer-skin cancer/melanoma     Breast Cancer Maternal Grandmother      DIABETES Maternal Uncle      Breast Cancer Other      Hypertension No family hx of      Hyperlipidemia No family hx of      CEREBROVASCULAR DISEASE No family hx of      Prostate Cancer No family hx of      Depression No family hx of       Anesthesia Reaction No family hx of      Asthma No family hx of      OSTEOPOROSIS No family hx of      Genetic Disorder No family hx of      Obesity No family hx of      MENTAL ILLNESS No family hx of      Substance Abuse No family hx of            Social History:     Social History   Substance Use Topics     Smoking status: Light Tobacco Smoker     Packs/day: 0.10     Years: 3.00     Types: Cigarettes     Smokeless tobacco: Current User      Comment: I use an e cig every now and than     Alcohol use No      Comment: quit      Social History     Social History Narrative            Review of Systems:   CONSTITUTIONAL: Mild temp elevations, not to threshold of fever.   EYES: negative for icterus  ENT:  negative for hearing loss, tinnitus, sore throat  RESPIRATORY:  Positive cough, sputum, No dyspnea  CARDIOVASCULAR:  negative for chest pain, palpitations  GASTROINTESTINAL:  negative for nausea, vomiting, diarrhea or constipation  GENITOURINARY:  negative for dysuria  HEME:  No easy bruising.  INTEGUMENT:  + rash over port site  NEURO:  Negative for headache         Current Medications:     Current Outpatient Prescriptions   Medication Sig Dispense Refill     sulfamethoxazole-trimethoprim (BACTRIM/SEPTRA) suspension Take 20 mLs (160 mg) by mouth 2 times daily for 14 days Dose based on TMP component. 560 mL 0     fentaNYL (DURAGESIC) 50 mcg/hr 72 hr patch Place 1 patch onto the skin every 48 hours MYLAN BRAND ONLY. Fill on/after 3/24/17 to start on/after 3/27/17 15 patch 0     oxyCODONE (ROXICODONE) 5 MG/5ML solution Take 10-15 mLs (10-15 mg) by mouth every 4 hours as needed for moderate to severe pain Max of 45mg per day. Fill on/after 3/24/17 not to start till 3/26/17. 1350 mL 0     lidocaine-prilocaine (EMLA) cream Apply topically as needed for moderate pain 30 g 3     [facility] INFUSION MANAGED PATIENT Contact [facility] for patient specific medication information at [phone] on admission  "and discharge from the hospital.  Phones are answered 24 hours a day 7 days a week 365 days a year.    Providers - Choose \"CONTINUE HOME MED (no script)\" at discharge if patient treatment with home infusion will continue.       mupirocin (BACTROBAN) 2 % ointment Apply topically 3 times daily 30 g 0     morphine 0.1% in intrasite topical gel Apply as needed prior to accessing the port site. 100 g 0     Needle, Disp, (BD DISP NEEDLES) 27G X 1/2\" MISC 1 Device every 30 days Use for cyanocobalamin injection once q 30 days. 3 each 4     ondansetron (ZOFRAN-ODT) 8 MG ODT tab Take 1 tablet (8 mg) by mouth every 8 hours as needed for nausea 90 tablet 3     vitamin D (ERGOCALCIFEROL) 54376 UNIT capsule Take 1 capsule (50,000 Units) by mouth every 7 days 12 capsule 3     sucralfate (CARAFATE) 1 GM/10ML suspension Take 10 mLs (1 g) by mouth 4 times daily 1200 mL 11     diazepam (VALIUM) 5 MG tablet 5 mg each morning and 5 mg at bedtime 60 tablet 2     cyanocobalamin (VITAMIN B12) 1000 MCG/ML injection Inject 1 mL (1,000 mcg) into the muscle every 30 days 1 mL 11     amphetamine-dextroamphetamine (ADDERALL) 20 MG per tablet Take 1 tablet (20 mg) by mouth 2 times daily 60 tablet 0     nystatin-triamcinolone (MYCOLOG II) cream Apply topically 2 times daily as needed 60 g 5     order for DME Equipment being ordered: Bilateral knee high chronic venous insufficiency stockings--  mild-moderate pressures. 4 each 5     [facility] INFUSION MANAGED PATIENT Contact [facility] for patient specific medication information at [phone] on admission and discharge from the hospital.  Phones are answered 24 hours a day 7 days a week 365 days a year.    Providers - Choose \"CONTINUE HOME MED (no script)\" at discharge if patient treatment with home infusion will continue.       senna-docusate (SENOKOT-S;PERICOLACE) 8.6-50 MG per tablet Take 1-2 tablets by mouth 2 times daily as needed for constipation 120 tablet 11     order for " DME Injection Supplies for Vitamin B12: 3cc syringes w/ 27 gauge needles, 1/2 inch length 12 each 0     [DISCONTINUED] NO ACTIVE MEDICATIONS . 0 0            Immunization History:     Immunization History   Administered Date(s) Administered     DTAP (<7y) 01/05/1973, 02/09/1973, 03/23/1973, 10/26/1974, 05/04/1978     IPV 01/05/1973, 03/23/1973, 10/26/1974, 05/04/1978     Influenza (IIV3) 09/01/2010, 10/06/2011, 10/23/2012     Influenza Vaccine IM 3yrs+ 4 Valent IIV4 10/13/2009, 10/06/2011, 10/23/2012, 09/25/2013, 10/14/2014, 10/09/2015, 09/20/2016     MMR 01/18/1974     Pneumococcal (PCV 13) 09/20/2016     Pneumococcal 23 valent 05/29/2008, 08/03/2015     TD (ADULT, 7+) 08/07/1997, 10/02/2008, 01/23/2009     TDAP (ADACEL AGES 11-64) 01/23/2009            Allergies:     Allergies   Allergen Reactions     Penicillins Anaphylaxis     Doxycycline Rash     Vancomycin Rash     Rash after receiving vancomycin 3/28/16 (red man's?). Tolerated with slower infusion and diphenhydramine premed.            Physical Exam:   Vital signs:  /72  Pulse 74  Temp 97.9  F (36.6  C) (Oral)  Ht 1.829 m (6')  Wt 88.2 kg (194 lb 6.4 oz)  BMI 26.37 kg/m2    Physical Examination:  GENERAL:  well-developed, well-nourished, seated in no acute distress. Easily conversant  HEENT:  Head is normocephalic, atraumatic   EYES:  Eyes have anicteric sclerae without conjunctival injection   LUNGS:  Clear to auscultation bilateral.   CARDIOVASCULAR:  Regular rate and rhythm with no murmurs, gallops or rubs.  ABDOMEN:  Normal bowel sounds, soft, nontender.   SKIN:  No acute rashes. Some erythema over the new port site - improved from the appearance on Ms. Acevedo's photos. Minimal tenderness - no fluid collections noted on palpation.     NEUROLOGIC:  Grossly nonfocal. Active x4 extremities         Laboratory Data:     Metabolic Studies   Sodium   Date Value Ref Range Status   03/12/2017 145 (H) 133 - 144 mmol/L Final   03/07/2017 144 133 - 144  mmol/L Final     Potassium   Date Value Ref Range Status   03/12/2017 3.3 (L) 3.4 - 5.3 mmol/L Final   03/07/2017 3.8 3.4 - 5.3 mmol/L Final     Comment:     Specimen slightly hemolyzed, potassium may be falsely elevated     Chloride   Date Value Ref Range Status   03/12/2017 110 (H) 94 - 109 mmol/L Final   03/07/2017 108 94 - 109 mmol/L Final     Carbon Dioxide   Date Value Ref Range Status   03/12/2017 25 20 - 32 mmol/L Final   03/07/2017 28 20 - 32 mmol/L Final     Anion Gap   Date Value Ref Range Status   03/12/2017 10 3 - 14 mmol/L Final   03/07/2017 8 3 - 14 mmol/L Final     Urea Nitrogen   Date Value Ref Range Status   03/12/2017 9 7 - 30 mg/dL Final   03/07/2017 15 7 - 30 mg/dL Final     Creatinine   Date Value Ref Range Status   03/12/2017 0.75 0.66 - 1.25 mg/dL Final   03/07/2017 0.62 (L) 0.66 - 1.25 mg/dL Final     GFR Estimate   Date Value Ref Range Status   03/12/2017 >90  Non  GFR Calc   >60 mL/min/1.7m2 Final   03/07/2017 >90  Non  GFR Calc   >60 mL/min/1.7m2 Final     Glucose   Date Value Ref Range Status   03/12/2017 84 70 - 99 mg/dL Final   03/07/2017 66 (L) 70 - 99 mg/dL Final     Hemoglobin A1C   Date Value Ref Range Status   07/23/2013 4.9 4.3 - 6.0 % Final     Calcium   Date Value Ref Range Status   03/12/2017 8.1 (L) 8.5 - 10.1 mg/dL Final   03/07/2017 8.3 (L) 8.5 - 10.1 mg/dL Final     Phosphorus   Date Value Ref Range Status   03/07/2017 3.7 2.5 - 4.5 mg/dL Final   02/10/2017 3.7 2.5 - 4.5 mg/dL Final     Magnesium   Date Value Ref Range Status   03/07/2017 2.2 1.6 - 2.3 mg/dL Final   02/10/2017 2.1 1.6 - 2.3 mg/dL Final     Lactic Acid   Date Value Ref Range Status   03/12/2017 <0.3 (L) 0.7 - 2.1 mmol/L Final   10/25/2016 1.0 0.7 - 2.1 mmol/L Final     CRP Inflammation   Date Value Ref Range Status   03/12/2017 3.4 0.0 - 8.0 mg/L Final   02/10/2017 7.6 0.0 - 8.0 mg/L Final   11/01/2016 8.4 (H) 0.0 - 8.0 mg/L Final   10/27/2016 50.0 (H) 0.0 - 8.0 mg/L Final    08/09/2016 <2.9 0.0 - 8.0 mg/L Final   04/20/2016 12.3 (H) 0.0 - 8.0 mg/L Final       Inflammatory Markers   CRP Inflammation   Date Value Ref Range Status   03/12/2017 3.4 0.0 - 8.0 mg/L Final   02/10/2017 7.6 0.0 - 8.0 mg/L Final   11/01/2016 8.4 (H) 0.0 - 8.0 mg/L Final   10/27/2016 50.0 (H) 0.0 - 8.0 mg/L Final   08/09/2016 <2.9 0.0 - 8.0 mg/L Final   04/20/2016 12.3 (H) 0.0 - 8.0 mg/L Final       Hepatic Studies    Bilirubin Total   Date Value Ref Range Status   03/07/2017 0.7 0.2 - 1.3 mg/dL Final   02/10/2017 0.6 0.2 - 1.3 mg/dL Final     Alkaline Phosphatase   Date Value Ref Range Status   03/07/2017 91 40 - 150 U/L Final   02/10/2017 82 40 - 150 U/L Final     Albumin   Date Value Ref Range Status   03/07/2017 3.5 3.4 - 5.0 g/dL Final   02/10/2017 3.4 3.4 - 5.0 g/dL Final     AST   Date Value Ref Range Status   03/07/2017 27 0 - 45 U/L Final     Comment:     Specimen is hemolyzed which can falsely elevate AST. Analysis of a   non-hemolyzed   specimen may result in a lower value.     02/10/2017 14 0 - 45 U/L Final     ALT   Date Value Ref Range Status   03/07/2017 22 0 - 70 U/L Final   02/10/2017 16 0 - 70 U/L Final       Hematology Studies      WBC   Date Value Ref Range Status   03/12/2017 6.0 4.0 - 11.0 10e9/L Final   03/07/2017 7.9 4.0 - 11.0 10e9/L Final     Absolute Neutrophil   Date Value Ref Range Status   03/12/2017 2.5 1.6 - 8.3 10e9/L Final   03/07/2017 4.8 1.6 - 8.3 10e9/L Final     Absolute Lymphocytes   Date Value Ref Range Status   03/12/2017 2.5 0.8 - 5.3 10e9/L Final   03/07/2017 2.1 0.8 - 5.3 10e9/L Final     Absolute Monocytes   Date Value Ref Range Status   03/12/2017 0.7 0.0 - 1.3 10e9/L Final   03/07/2017 0.9 0.0 - 1.3 10e9/L Final     Absolute Eosinophils   Date Value Ref Range Status   03/12/2017 0.3 0.0 - 0.7 10e9/L Final   03/07/2017 0.1 0.0 - 0.7 10e9/L Final     Hemoglobin   Date Value Ref Range Status   03/12/2017 11.4 (L) 13.3 - 17.7 g/dL Final   03/07/2017 13.4 13.3 - 17.7  g/dL Final     Hematocrit   Date Value Ref Range Status   03/12/2017 34.1 (L) 40.0 - 53.0 % Final   03/07/2017 41.3 40.0 - 53.0 % Final     Platelet Count   Date Value Ref Range Status   03/12/2017 135 (L) 150 - 450 10e9/L Final   03/07/2017 183 150 - 450 10e9/L Final     Imaging:  Recent Results (from the past 744 hour(s))   XR Chest 2 Views    Narrative    EXAM: XR CHEST 2 VW  3/12/2017 3:27 AM      HISTORY: Right chest discomfort, near Port    COMPARISON: 4/11/2016    FINDINGS: Right chest Port-A-Cath tip projecting over the superior  atriocaval junction. Cardiac silhouette and pulmonary vasculature are  within normal limits. No pleural effusion or pneumothorax.  No focal  airspace opacities. No acute bony abnormalities. Visualized upper  abdomen is unremarkable.        Impression    IMPRESSION: Right chest Port-A-Cath tip at the superior atrial caval  junction. No acute airspace disease.    I have personally reviewed the examination and initial interpretation  and I agree with the findings.    VY ARMAS MD   [facility] P[facility]    Narrative    Findings/    Impression    Impression:   Fluoroscopy was provided to the vascular access nursing service for  documentation of the tip position of the PICC. The tip projects over  the superior atriocaval junction.     THA FARMER MD       Again, thank you for allowing me to participate in the care of your patient.      Sincerely,    Gabbi Thompson MD

## 2017-03-16 NOTE — TELEPHONE ENCOUNTER
Our goal is to have forms completed with 72 hours, however some forms may require a visit or additional information.    Who is the form from?: [facility] IVN-I Nurse (if other please explain)  Where the form came from: form was faxed in  What clinic location was the form placed at?: [facility]  Where the form was placed: 's Box  What number is listed as a contact on the form?: [phone]    Phone call message- patient request for a letter, form or note:    Date needed: as soon as possible  Please fax to [phone]  Has the patient signed a consent form for release of information? YES    Additional comments:     Call taken on 3/16/2017 at 10:37 AM by Fabiola Jain    Type of letter, form or note: medical

## 2017-03-16 NOTE — ASSESSMENT & PLAN NOTE
He has had clinical improvement on Bactrim.  I will plan to continue this for 4 weeks.  I would not access the port until we complete therapy.  We will put a PICC line in in the interim so that he can continue his TPN.  At that time we will reassess how he is doing clinically

## 2017-03-16 NOTE — PROGRESS NOTES
[facility] - Progress Note  Dr. Gabbi Thompson, [facility], [facility], [address] [place] [address], Sixth Floor, [address]  [place] [address]  Patient:  Parker Acevedo Sr, Date of birth 1970, Medical record number [id number]  Date of Visit: Mar 16, 2017         Assessment and Recommendations:     Suspected port-a-cath hub infection  We discussed today extensively our possible approaches. I let them know that ideally we remove the port and given him a course of antibiotics.  However, Mr. Acevedo has difficult access issues and has already required multiple port removals.  Their preference is to try to salvage the line and both Parker and his wife understand that there is an increased risk with this approach that he will not clear the infection including the risk for an intravascular infection.  We will plan to hold use of the line considering the pain, continue Bactrim for a total of 4 weeks.  We will have a PICC line placed for his TPN.  On discussion with his health care team, him and his wife, it sounds like 4 weeks is too long to be without TPN.     Gabbi Thompson MD  Division of Infectious Diseases and International Medicine  [phone]         History of Infectious Disease Illness:   Parker Acevedo Sr is a 46 year old man who I previously saw about a port infection.  At that time, he had positive blood cultures, he presented with fevers and he had his port removed and  A complete course of IV antibiotics.  He comes in today because he has developed a lot of pain and irritation over the port site.  This included erythema and pustular lesions.  He was having low grade fevers but not full fever.  He was seen in the ED and he had blood cultures drawn.  He infusion team also contacted me and I discussed what was going on with Parker's wife a couple days ago.  At that time, I  requested that she take photos of the site, stop TPN and come and see me.  I also started Bactrim therapy. The description sounded consistent with a hub infection, perhaps a small abscess.  They started this and he reports significant improvement since starting. No nausea or vomiting.  He is having some systemic weakness. No headache.         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)      Chronic abdominal pain      Difficulty swallowing      Gastric ulcer, unspecified as acute or chronic, without mention of hemorrhage, perforation, or obstruction      Gastro-oesophageal reflux disease      Head injury      Hiatal hernia      Other bladder disorder      Other chronic pain      Severe malnutrition (H)      TPN     Short gut syndrome      Tobacco abuse        Past Surgical History   Procedure Laterality Date     Hernia repair  2006     Umbilical hernia     Endoscopy  03/25/11     EGD, [facility]     Endoscopy  08/04/09     Upper Endoscopy, [facility]     Endoscopy  01/05/09     Upper Endoscopy, [facility]     Herniorrhaphy hiatal  6/22/2012     Procedure: HERNIORRHAPHY HIATAL;;  Surgeon: Francis Vyas MD;  Location: [facility]     Celestino en y bowel  2003     Cholecystectomy       Gastrojejunostomy  08/26/09     Extensice enterolysis, partial resect. jejunum, part. resect gastric pouch, gastrojejunostomy anastomosis     Gastrectomy  6/22/2012     Procedure: GASTRECTOMY;  Open Approach, Excise Ulcers,Partial Gastrectomy, Esophagojejunostomy, Hiatal Hernia Repair, Extensive Lysis of Adhesions and Esaphagogastrodudenoscopy.;  Surgeon: Francis Vyas MD;  Location: [facility] OR     Tonsillectomy       C gastric bypass,obese<100cm celestino-en-y  2002      lost 300 pounds     Esophagoscopy, gastroscopy, duodenoscopy (egd), combined  4/20/2011     Procedure:COMBINED ESOPHAGOSCOPY, GASTROSCOPY, DUODENOSCOPY (EGD); Surgeon:FRANCIS VYAS; Location:[facility]     Endoscopic ultrasound upper gastrointestinal tract (gi)  4/29/2011     Procedure:ENDOSCOPIC ULTRASOUND UPPER GASTROINTESTINAL TRACT (GI); Both Procedures done Conjointly; Surgeon:NEREIDA HOUSER; [facility]     Esophagoscopy, gastroscopy, duodenoscopy (egd), combined  6/15/2011     Procedure:COMBINED ESOPHAGOSCOPY, GASTROSCOPY, DUODENOSCOPY (EGD); Surgeon:FRANCIS VYAS; [facility]     Esophagoscopy, gastroscopy, duodenoscopy (egd), combined  6/12/2013     Procedure: COMBINED ESOPHAGOSCOPY, GASTROSCOPY, DUODENOSCOPY (EGD);;  Surgeon: Francis Vyas MD;  Location: [facility]     Hc esoph/gas reflux test w nasal imped electrode  8/5/2013     Procedure: ESOPHAGEAL IMPEDENCE FUNCTION TEST 1 HOUR OR LESS;  Surgeon: Halie Lang MD;  Location: [facility]     Endoscopic ultrasound upper gastrointestinal tract (gi)  9/9/2013     Procedure: ENDOSCOPIC ULTRASOUND UPPER GASTROINTESTINAL TRACT (GI);  Endoscopic Ultrasound Guide Gastrostomy Tube Placement  C-arm;  Surgeon: Noe Lizarraga MD;  [facility]: [facility]     Endoscopic insertion tube gastrostomy  9/9/2013     Procedure: ENDOSCOPIC INSERTION TUBE GASTROSTOMY;;  Surgeon: Francis Vyas MD;  [facility]     Laparotomy exploratory  11/22/2013     Procedure: LAPAROTOMY EXPLORATORY;  Exploratory Laparotomy, Upper Endoscopy, Left Inguinal Hernia Repair;  Surgeon: Francis Vyas MD;  [facility]     Herniorrhaphy inguinal  11/22/2013     Procedure: HERNIORRHAPHY INGUINAL;;  Surgeon: Francis Vyas MD;  [facility]     Esophagoscopy, gastroscopy, duodenoscopy (egd), combined  11/22/2013     Procedure: COMBINED ESOPHAGOSCOPY, GASTROSCOPY, DUODENOSCOPY (EGD);;  Surgeon: Francis Vyas MD;  [facility]      Esophagoscopy, gastroscopy, duodenoscopy (egd), combined  4/30/2014     Procedure: COMBINED ESOPHAGOSCOPY, GASTROSCOPY, DUODENOSCOPY (EGD);  Surgeon: Francis Vyas MD;  Location: [facility]     Appendectomy       Soft tissue surgery       Back surgery  11/3/2014     curve in the spine     Biopsy lymph node cervical N/A 2/20/2015     Procedure: BIOPSY LYMPH NODE CERVICAL;  Surgeon: Baron Scanlon MD;  Location[facility]     Esophagoscopy, gastroscopy, duodenoscopy (egd), combined N/A 2/20/2015     Procedure: COMBINED ESOPHAGOSCOPY, GASTROSCOPY, DUODENOSCOPY (EGD), BIOPSY SINGLE OR MULTIPLE;  Surgeon: Baron Scanlon MD;  Location[facility]     Soft tissue surgery       Esophagoscopy, gastroscopy, duodenoscopy (egd), combined N/A 9/30/2015     Procedure: COMBINED ESOPHAGOSCOPY, GASTROSCOPY, DUODENOSCOPY (EGD);  Surgeon: Francis Vyas MD;  [facility]     Discectomy, fusion cervical anterior one level, combined N/A 2/15/2017     Procedure: COMBINED DISCECTOMY, FUSION CERVICAL ANTERIOR ONE LEVEL;  Surgeon: Darren Campos MD;  Location: [facility]           Family History:     Family History   Problem Relation Age of Onset     GASTROINTESTINAL DISEASE Mother      Crohns disease     Anxiety Disorder Mother      Thyroid Disease Mother      Grave's disease     CANCER Father      ear cancer-skin cancer/melanoma     Breast Cancer Maternal Grandmother      DIABETES Maternal Uncle      Breast Cancer Other      Hypertension No family hx of      Hyperlipidemia No family hx of      CEREBROVASCULAR DISEASE No family hx of      Prostate Cancer No family hx of      Depression No family hx of      Anesthesia Reaction No family hx of      Asthma No family hx of      OSTEOPOROSIS No family hx of      Genetic Disorder No family hx of      Obesity No family hx of      MENTAL ILLNESS No family hx of      Substance Abuse No family hx of            Social History:     Social History   Substance Use Topics     Smoking status:  "Light Tobacco Smoker     Packs/day: 0.10     Years: 3.00     Types: Cigarettes     Smokeless tobacco: Current User      Comment: I use an e cig every now and than     Alcohol use No      Comment: quit      Social History     Social History Narrative            Review of Systems:   CONSTITUTIONAL: Mild temp elevations, not to threshold of fever.   EYES: negative for icterus  ENT:  negative for hearing loss, tinnitus, sore throat  RESPIRATORY:  Positive cough, sputum, No dyspnea  CARDIOVASCULAR:  negative for chest pain, palpitations  GASTROINTESTINAL:  negative for nausea, vomiting, diarrhea or constipation  GENITOURINARY:  negative for dysuria  HEME:  No easy bruising.  INTEGUMENT:  + rash over port site  NEURO:  Negative for headache         Current Medications:     Current Outpatient Prescriptions   Medication Sig Dispense Refill     sulfamethoxazole-trimethoprim (BACTRIM/SEPTRA) suspension Take 20 mLs (160 mg) by mouth 2 times daily for 14 days Dose based on TMP component. 560 mL 0     fentaNYL (DURAGESIC) 50 mcg/hr 72 hr patch Place 1 patch onto the skin every 48 hours MYLAN BRAND ONLY. Fill on/after 3/24/17 to start on/after 3/27/17 15 patch 0     oxyCODONE (ROXICODONE) 5 MG/5ML solution Take 10-15 mLs (10-15 mg) by mouth every 4 hours as needed for moderate to severe pain Max of 45mg per day. Fill on/after 3/24/17 not to start till 3/26/17. 1350 mL 0     lidocaine-prilocaine (EMLA) cream Apply topically as needed for moderate pain 30 g 3     [place] HOME INFUSION MANAGED PATIENT Contact [facility] for patient specific medication information at [phone] on admission and discharge from the hospital.  Phones are answered 24 hours a day 7 days a week 365 days a year.    Providers - Choose \"CONTINUE HOME MED (no script)\" at discharge if patient treatment with home infusion will continue.       mupirocin (BACTROBAN) 2 % ointment Apply topically 3 times daily 30 g 0     morphine 0.1% in intrasite " "topical gel Apply as needed prior to accessing the port site. 100 g 0     Needle, Disp, (BD DISP NEEDLES) 27G X 1/2\" MISC 1 Device every 30 days Use for cyanocobalamin injection once q 30 days. 3 each 4     ondansetron (ZOFRAN-ODT) 8 MG ODT tab Take 1 tablet (8 mg) by mouth every 8 hours as needed for nausea 90 tablet 3     vitamin D (ERGOCALCIFEROL) 35776 UNIT capsule Take 1 capsule (50,000 Units) by mouth every 7 days 12 capsule 3     sucralfate (CARAFATE) 1 GM/10ML suspension Take 10 mLs (1 g) by mouth 4 times daily 1200 mL 11     diazepam (VALIUM) 5 MG tablet 5 mg each morning and 5 mg at bedtime 60 tablet 2     cyanocobalamin (VITAMIN B12) 1000 MCG/ML injection Inject 1 mL (1,000 mcg) into the muscle every 30 days 1 mL 11     amphetamine-dextroamphetamine (ADDERALL) 20 MG per tablet Take 1 tablet (20 mg) by mouth 2 times daily 60 tablet 0     nystatin-triamcinolone (MYCOLOG II) cream Apply topically 2 times daily as needed 60 g 5     order for DME Equipment being ordered: Bilateral knee high chronic venous insufficiency stockings--  mild-moderate pressures. 4 each 5     [place] HOME INFUSION MANAGED PATIENT Contact [facility] for patient specific medication information at [phone] on admission and discharge from the hospital.  Phones are answered 24 hours a day 7 days a week 365 days a year.    Providers - Choose \"CONTINUE HOME MED (no script)\" at discharge if patient treatment with home infusion will continue.       senna-docusate (SENOKOT-S;PERICOLACE) 8.6-50 MG per tablet Take 1-2 tablets by mouth 2 times daily as needed for constipation 120 tablet 11     order for DME Injection Supplies for Vitamin B12: 3cc syringes w/ 27 gauge needles, 1/2 inch length 12 each 0     [DISCONTINUED] NO ACTIVE MEDICATIONS . 0 0            Immunization History:     Immunization History   Administered Date(s) Administered     DTAP (<7y) 01/05/1973, 02/09/1973, 03/23/1973, 10/26/1974, 05/04/1978     IPV " 01/05/1973, 03/23/1973, 10/26/1974, 05/04/1978     Influenza (IIV3) 09/01/2010, 10/06/2011, 10/23/2012     Influenza Vaccine IM 3yrs+ 4 Valent IIV4 10/13/2009, 10/06/2011, 10/23/2012, 09/25/2013, 10/14/2014, 10/09/2015, 09/20/2016     MMR 01/18/1974     Pneumococcal (PCV 13) 09/20/2016     Pneumococcal 23 valent 05/29/2008, 08/03/2015     TD (ADULT, 7+) 08/07/1997, 10/02/2008, 01/23/2009     TDAP (ADACEL AGES 11-64) 01/23/2009            Allergies:     Allergies   Allergen Reactions     Penicillins Anaphylaxis     Doxycycline Rash     Vancomycin Rash     Rash after receiving vancomycin 3/28/16 (red man's?). Tolerated with slower infusion and diphenhydramine premed.            Physical Exam:   Vital signs:  /72  Pulse 74  Temp 97.9  F (36.6  C) (Oral)  Ht 1.829 m (6')  Wt 88.2 kg (194 lb 6.4 oz)  BMI 26.37 kg/m2    Physical Examination:  GENERAL:  well-developed, well-nourished, seated in no acute distress. Easily conversant  HEENT:  Head is normocephalic, atraumatic   EYES:  Eyes have anicteric sclerae without conjunctival injection   LUNGS:  Clear to auscultation bilateral.   CARDIOVASCULAR:  Regular rate and rhythm with no murmurs, gallops or rubs.  ABDOMEN:  Normal bowel sounds, soft, nontender.   SKIN:  No acute rashes. Some erythema over the new port site - improved from the appearance on Ms. Acevedo's photos. Minimal tenderness - no fluid collections noted on palpation.     NEUROLOGIC:  Grossly nonfocal. Active x4 extremities         Laboratory Data:     Metabolic Studies   Sodium   Date Value Ref Range Status   03/12/2017 145 (H) 133 - 144 mmol/L Final   03/07/2017 144 133 - 144 mmol/L Final     Potassium   Date Value Ref Range Status   03/12/2017 3.3 (L) 3.4 - 5.3 mmol/L Final   03/07/2017 3.8 3.4 - 5.3 mmol/L Final     Comment:     Specimen slightly hemolyzed, potassium may be falsely elevated     Chloride   Date Value Ref Range Status   03/12/2017 110 (H) 94 - 109 mmol/L Final   03/07/2017 108 94  - 109 mmol/L Final     Carbon Dioxide   Date Value Ref Range Status   03/12/2017 25 20 - 32 mmol/L Final   03/07/2017 28 20 - 32 mmol/L Final     Anion Gap   Date Value Ref Range Status   03/12/2017 10 3 - 14 mmol/L Final   03/07/2017 8 3 - 14 mmol/L Final     Urea Nitrogen   Date Value Ref Range Status   03/12/2017 9 7 - 30 mg/dL Final   03/07/2017 15 7 - 30 mg/dL Final     Creatinine   Date Value Ref Range Status   03/12/2017 0.75 0.66 - 1.25 mg/dL Final   03/07/2017 0.62 (L) 0.66 - 1.25 mg/dL Final     GFR Estimate   Date Value Ref Range Status   03/12/2017 >90  Non  GFR Calc   >60 mL/min/1.7m2 Final   03/07/2017 >90  Non  GFR Calc   >60 mL/min/1.7m2 Final     Glucose   Date Value Ref Range Status   03/12/2017 84 70 - 99 mg/dL Final   03/07/2017 66 (L) 70 - 99 mg/dL Final     Hemoglobin A1C   Date Value Ref Range Status   07/23/2013 4.9 4.3 - 6.0 % Final     Calcium   Date Value Ref Range Status   03/12/2017 8.1 (L) 8.5 - 10.1 mg/dL Final   03/07/2017 8.3 (L) 8.5 - 10.1 mg/dL Final     Phosphorus   Date Value Ref Range Status   03/07/2017 3.7 2.5 - 4.5 mg/dL Final   02/10/2017 3.7 2.5 - 4.5 mg/dL Final     Magnesium   Date Value Ref Range Status   03/07/2017 2.2 1.6 - 2.3 mg/dL Final   02/10/2017 2.1 1.6 - 2.3 mg/dL Final     Lactic Acid   Date Value Ref Range Status   03/12/2017 <0.3 (L) 0.7 - 2.1 mmol/L Final   10/25/2016 1.0 0.7 - 2.1 mmol/L Final     CRP Inflammation   Date Value Ref Range Status   03/12/2017 3.4 0.0 - 8.0 mg/L Final   02/10/2017 7.6 0.0 - 8.0 mg/L Final   11/01/2016 8.4 (H) 0.0 - 8.0 mg/L Final   10/27/2016 50.0 (H) 0.0 - 8.0 mg/L Final   08/09/2016 <2.9 0.0 - 8.0 mg/L Final   04/20/2016 12.3 (H) 0.0 - 8.0 mg/L Final       Inflammatory Markers   CRP Inflammation   Date Value Ref Range Status   03/12/2017 3.4 0.0 - 8.0 mg/L Final   02/10/2017 7.6 0.0 - 8.0 mg/L Final   11/01/2016 8.4 (H) 0.0 - 8.0 mg/L Final   10/27/2016 50.0 (H) 0.0 - 8.0 mg/L Final    08/09/2016 <2.9 0.0 - 8.0 mg/L Final   04/20/2016 12.3 (H) 0.0 - 8.0 mg/L Final       Hepatic Studies    Bilirubin Total   Date Value Ref Range Status   03/07/2017 0.7 0.2 - 1.3 mg/dL Final   02/10/2017 0.6 0.2 - 1.3 mg/dL Final     Alkaline Phosphatase   Date Value Ref Range Status   03/07/2017 91 40 - 150 U/L Final   02/10/2017 82 40 - 150 U/L Final     Albumin   Date Value Ref Range Status   03/07/2017 3.5 3.4 - 5.0 g/dL Final   02/10/2017 3.4 3.4 - 5.0 g/dL Final     AST   Date Value Ref Range Status   03/07/2017 27 0 - 45 U/L Final     Comment:     Specimen is hemolyzed which can falsely elevate AST. Analysis of a   non-hemolyzed   specimen may result in a lower value.     02/10/2017 14 0 - 45 U/L Final     ALT   Date Value Ref Range Status   03/07/2017 22 0 - 70 U/L Final   02/10/2017 16 0 - 70 U/L Final       Hematology Studies      WBC   Date Value Ref Range Status   03/12/2017 6.0 4.0 - 11.0 10e9/L Final   03/07/2017 7.9 4.0 - 11.0 10e9/L Final     Absolute Neutrophil   Date Value Ref Range Status   03/12/2017 2.5 1.6 - 8.3 10e9/L Final   03/07/2017 4.8 1.6 - 8.3 10e9/L Final     Absolute Lymphocytes   Date Value Ref Range Status   03/12/2017 2.5 0.8 - 5.3 10e9/L Final   03/07/2017 2.1 0.8 - 5.3 10e9/L Final     Absolute Monocytes   Date Value Ref Range Status   03/12/2017 0.7 0.0 - 1.3 10e9/L Final   03/07/2017 0.9 0.0 - 1.3 10e9/L Final     Absolute Eosinophils   Date Value Ref Range Status   03/12/2017 0.3 0.0 - 0.7 10e9/L Final   03/07/2017 0.1 0.0 - 0.7 10e9/L Final     Hemoglobin   Date Value Ref Range Status   03/12/2017 11.4 (L) 13.3 - 17.7 g/dL Final   03/07/2017 13.4 13.3 - 17.7 g/dL Final     Hematocrit   Date Value Ref Range Status   03/12/2017 34.1 (L) 40.0 - 53.0 % Final   03/07/2017 41.3 40.0 - 53.0 % Final     Platelet Count   Date Value Ref Range Status   03/12/2017 135 (L) 150 - 450 10e9/L Final   03/07/2017 183 150 - 450 10e9/L Final     Imaging:  Recent Results (from the past 744  hour(s))   XR Chest 2 Views    Narrative    EXAM: XR CHEST 2 VW  3/12/2017 3:27 AM      HISTORY: Right chest discomfort, near Port    COMPARISON: 4/11/2016    FINDINGS: Right chest Port-A-Cath tip projecting over the superior  atriocaval junction. Cardiac silhouette and pulmonary vasculature are  within normal limits. No pleural effusion or pneumothorax.  No focal  airspace opacities. No acute bony abnormalities. Visualized upper  abdomen is unremarkable.        Impression    IMPRESSION: Right chest Port-A-Cath tip at the superior atrial caval  junction. No acute airspace disease.    I have personally reviewed the examination and initial interpretation  and I agree with the findings.    VY ARMAS MD   [facility]    Narrative    Findings/    Impression    Impression:   Fluoroscopy was provided to the vascular access nursing service for  documentation of the tip position of the PICC. The tip projects over  the superior atriocaval junction.     THA FARMER MD

## 2017-03-16 NOTE — NURSING NOTE
Chief Complaint   Patient presents with     RECHECK     follow up with port infection, tzimmer cma       Initial /72  Pulse 74  Temp 97.9  F (36.6  C) (Oral)  Ht 1.829 m (6')  Wt 88.2 kg (194 lb 6.4 oz)  BMI 26.37 kg/m2 Estimated body mass index is 26.37 kg/(m^2) as calculated from the following:    Height as of this encounter: 1.829 m (6').    Weight as of this encounter: 88.2 kg (194 lb 6.4 oz).  Medication Reconciliation: complete

## 2017-03-16 NOTE — NURSING NOTE
Per Dr. Thompson, Pt needs double lumen in case she needs to order IV abx in addition to TPN. Called vascular access and let them know that pt will need double lumen instead of single.  Astrid Saucedo RN

## 2017-03-16 NOTE — MR AVS SNAPSHOT
After Visit Summary   3/16/2017    Parker Acevedo Sr    MRN: [id number]           Patient Information     Date Of Birth          1970        Visit Information        Provider Department      3/16/2017 12:00 PM Gabbi Thompson MD [facility] and Infectious Diseases        Today's Diagnoses     Port or reservoir infection, initial encounter    -  1    Cellulitis, unspecified cellulitis site          Care Instructions    Continue Bactrim therapy for 4 weeks.        Follow-ups after your visit        Follow-up notes from your care team     Return in about 6 weeks (around 4/27/2017).      Your next 10 appointments already scheduled     Mar 16, 2017  1:30 PM CDT   IR PICC VASCULAR with UUVAS1   [facility], [place], Vascular Access ([facility], [facility])    [address] [place] [id number]              1. You will need to have had a history and physical exam within 7 days of the procedure. 2. Laboratory test are to be obtained by your doctor prior to the exam (CBCP, INR and PTT) 3. Someone will need to drive you to and from the hospital. 4. If you are or may be pregnant, contact your doctor or a Radiology nurse prior to the day of the exam. 5. If you have diabetes, check with your doctor or a Radiology nurse to see if your insulin needs to be adjusted for the exam. 6. If you are taking Coumadin (to thin you blood) please contact your doctor or a Radiology nurse at least 3 days before the exam for special instructions. 7. The day before your exam you may eat your regular diet and are encouraged to drink at least 2 quarts of clear liquids. Drink no alcoholic beverages for 24 hours prior to the exam. 8. Do not eat any solid food or milk products for 6 hours prior to the exam. You may drink clear liquids until 2 hours prior to the exam. Clear liquids include the following: water, Jell-O, clear broth, apple juice or any noncarbonated  drink that you can see through (no pop!) 9. The morning of the exam you may brush your teeth and take medications as directed with a sip of water. 10. Tell the Radiology nurse if you have any allergies.            Mar 30, 2017  1:00 PM CDT   Return Visit with Aissatou Wilson PA-C   [facility]    [address] [address]   [phone]            Apr 05, 2017  2:30 PM CDT   Return Visit with Patti Milligan MD   [facility] ([facility])    [address] [id number] [phone]            Apr 17, 2017 11:00 AM CDT   Office Visit with Esteban Daly MD   [facility] ([facility])    [address]  [facility] [address] [phone]           Bring a current list of meds and any records pertaining to this visit.  For Physicals, please bring immunization records and any forms needing to be filled out.  Please arrive 10 minutes early to complete paperwork.            Apr 27, 2017 11:30 AM CDT   (Arrive by 11:15 AM)   Return Visit with Gabbi Thompson MD   [facility] and Infectious Diseases ([facility] [place] [facility])    [address]  [address] [facility] [id number] [phone]              Future tests that were ordered for you today     Open Standing Orders        Priority Remaining Interval Expires Ordered    XR Chest 1 View STAT 2/2 CONDITIONAL X 2  3/16/2017    XR Chest Port 1 View STAT 2/2 CONDITIONAL X 2  3/16/2017    Retention sutures Routine [id number] PRN  3/16/2017          Open Future Orders        Priority Expected Expires Ordered    IR PICC Vascular Routine  3/16/2018 3/16/2017            Who to contact     If you have questions or need follow up information about today's clinic visit or your schedule please contact [facility] AND INFECTIOUS DISEASES directly  at [phone].  Normal or non-critical lab and imaging results will be communicated to you by [employer], letter or phone within 4 business days after the clinic has received the results. If you do not hear from us within 7 days, please contact the clinic through [employer] or phone. If you have a critical or abnormal lab result, we will notify you by phone as soon as possible.  Submit refill requests through [employer] or call your pharmacy and they will forward the refill request to us. Please allow 3 business days for your refill to be completed.          Additional Information About Your Visit        [employer] Information     [employer] gives you secure access to your electronic health record. If you see a primary care provider, you can also send messages to your care team and make appointments. If you have questions, please call your primary care clinic.  If you do not have a primary care provider, please call [phone] and they will assist you.        Care EveryWhere ID     This is your Care EveryWhere ID. This could be used by other organizations to access your [place] medical records  [id number]        Your Vitals Were     Pulse Temperature Height BMI (Body Mass Index)          74 97.9  F (36.6  C) (Oral) 1.829 m (6') 26.37 kg/m2         Blood Pressure from Last 3 Encounters:   03/16/17 132/72   03/12/17 124/85   02/16/17 119/71    Weight from Last 3 Encounters:   03/16/17 88.2 kg (194 lb 6.4 oz)   02/15/17 88 kg (194 lb 0.1 oz)   02/13/17 89.4 kg (197 lb)              Today, you had the following     No orders found for display         Today's Medication Changes          These changes are accurate as of: 3/16/17 12:34 PM.  If you have any questions, ask your nurse or doctor.               These medicines have changed or have updated prescriptions.        Dose/Directions    amphetamine-dextroamphetamine 20 MG per tablet   Commonly known as:  ADDERALL   This may have changed:  Another medication with the same name  was removed. Continue taking this medication, and follow the directions you see here.   Used for:  ADHD (attention deficit hyperactivity disorder), inattentive type   Changed by:  Esteban Daly MD        Dose:  20 mg   Take 1 tablet (20 mg) by mouth 2 times daily   Quantity:  60 tablet   Refills:  0         Stop taking these medicines if you haven't already. Please contact your care team if you have questions.     dextrose 1,000 mL with sodium chloride 76.92 mEq solution   Stopped by:  Gabbi Thompson MD           polyethylene glycol powder   Commonly known as:  MIRALAX   Stopped by:  Gabbi Thompson MD                Where to get your medicines      These medications were sent to [facility] [address]  [address] [facility] [address]     Phone:  [phone]     sulfamethoxazole-trimethoprim suspension                Primary Care Provider Office Phone # Fax #    Esteban Daly [phone]       [facility] [address] [place] [address]        Thank you!     Thank you for choosing [facility] AND INFECTIOUS DISEASES  for your care. Our goal is always to provide you with excellent care. Hearing back from our patients is one way we can continue to improve our services. Please take a few minutes to complete the written survey that you may receive in the mail after your visit with us. Thank you!             Your Updated Medication List - Protect others around you: Learn how to safely use, store and throw away your medicines at www.disposemymeds.org.          This list is accurate as of: 3/16/17 12:34 PM.  Always use your most recent med list.                   Brand Name Dispense Instructions for use    amphetamine-dextroamphetamine 20 MG per tablet    ADDERALL    60 tablet    Take 1 tablet (20 mg) by mouth 2 times daily       cyanocobalamin 1000 MCG/ML injection    VITAMIN B12    1 mL  "   Inject 1 mL (1,000 mcg) into the muscle every 30 days       diazepam 5 MG tablet    VALIUM    60 tablet    5 mg each morning and 5 mg at bedtime       * [place] [facility] MANAGED PATIENT      Contact [facility] for patient specific medication information at [phone] on admission and discharge from the hospital.  Phones are answered 24 hours a day 7 days a week 365 days a year.  Providers - Choose \"CONTINUE HOME MED (no script)\" at discharge if patient treatment with home infusion will continue.       * [place] [facility]NAGED PATIENT      Contact [facility] for patient specific medication information at [phone] on admission and discharge from the hospital.  Phones are answered 24 hours a day 7 days a week 365 days a year.  Providers - Choose \"CONTINUE HOME MED (no script)\" at discharge if patient treatment with home infusion will continue.       fentaNYL 50 mcg/hr 72 hr patch    DURAGESIC    15 patch    Place 1 patch onto the skin every 48 hours MYLAN BRAND ONLY. Fill on/after 3/24/17 to start on/after 3/27/17       lidocaine-prilocaine cream    EMLA    30 g    Apply topically as needed for moderate pain       morphine 0.1% in intrasite topical gel     100 g    Apply as needed prior to accessing the port site.       mupirocin 2 % ointment    BACTROBAN    30 g    Apply topically 3 times daily       Needle (Disp) 27G X 1/2\" Misc    BD DISP NEEDLES    3 each    1 Device every 30 days Use for cyanocobalamin injection once q 30 days.       nystatin-triamcinolone cream    MYCOLOG II    60 g    Apply topically 2 times daily as needed       ondansetron 8 MG ODT tab    ZOFRAN-ODT    90 tablet    Take 1 tablet (8 mg) by mouth every 8 hours as needed for nausea       * order for DME     12 each    Injection Supplies for Vitamin B12: 3cc syringes w/ 27 gauge needles, 1/2 inch length       * order for DME     4 each    Equipment being ordered: Bilateral knee high chronic venous " insufficiency stockings--  mild-moderate pressures.       oxyCODONE 5 MG/5ML solution    ROXICODONE    1350 mL    Take 10-15 mLs (10-15 mg) by mouth every 4 hours as needed for moderate to severe pain Max of 45mg per day. Fill on/after 3/24/17 not to start till 3/26/17.       senna-docusate 8.6-50 MG per tablet    SENOKOT-S;PERICOLACE    120 tablet    Take 1-2 tablets by mouth 2 times daily as needed for constipation       sucralfate 1 GM/10ML suspension    CARAFATE    1200 mL    Take 10 mLs (1 g) by mouth 4 times daily       sulfamethoxazole-trimethoprim suspension    BACTRIM/SEPTRA    1120 mL    Take 20 mLs (160 mg) by mouth 2 times daily for 28 days Dose based on TMP component.       vitamin D 48372 UNIT capsule    ERGOCALCIFEROL    12 capsule    Take 1 capsule (50,000 Units) by mouth every 7 days       * Notice:  This list has 4 medication(s) that are the same as other medications prescribed for you. Read the directions carefully, and ask your doctor or other care provider to review them with you.

## 2017-03-18 LAB
BACTERIA SPEC CULT: NO GROWTH
BACTERIA SPEC CULT: NO GROWTH
MICRO REPORT STATUS: NORMAL
MICRO REPORT STATUS: NORMAL
SPECIMEN SOURCE: NORMAL
SPECIMEN SOURCE: NORMAL

## 2017-03-20 LAB
ALBUMIN SERPL-MCNC: 3.1 G/DL (ref 3.4–5)
ALP SERPL-CCNC: 86 U/L (ref 40–150)
ALT SERPL W P-5'-P-CCNC: 22 U/L (ref 0–70)
ANION GAP SERPL CALCULATED.3IONS-SCNC: 2 MMOL/L (ref 3–14)
AST SERPL W P-5'-P-CCNC: ABNORMAL U/L (ref 0–45)
BASOPHILS # BLD AUTO: 0 10E9/L (ref 0–0.2)
BASOPHILS NFR BLD AUTO: 0.3 %
BILIRUB DIRECT SERPL-MCNC: <0.1 MG/DL (ref 0–0.2)
BILIRUB SERPL-MCNC: 0.4 MG/DL (ref 0.2–1.3)
BUN SERPL-MCNC: 17 MG/DL (ref 7–30)
CALCIUM SERPL-MCNC: 8.4 MG/DL (ref 8.5–10.1)
CHLORIDE SERPL-SCNC: 109 MMOL/L (ref 94–109)
CK SERPL-CCNC: NORMAL U/L (ref 30–300)
CO2 SERPL-SCNC: 26 MMOL/L (ref 20–32)
CREAT SERPL-MCNC: ABNORMAL MG/DL (ref 0.66–1.25)
DIFFERENTIAL METHOD BLD: ABNORMAL
EOSINOPHIL # BLD AUTO: 0.5 10E9/L (ref 0–0.7)
EOSINOPHIL NFR BLD AUTO: 8.1 %
ERYTHROCYTE [DISTWIDTH] IN BLOOD BY AUTOMATED COUNT: 14.3 % (ref 10–15)
GFR SERPL CREATININE-BSD FRML MDRD: ABNORMAL ML/MIN/1.7M2
GLUCOSE SERPL-MCNC: 91 MG/DL (ref 70–99)
HCT VFR BLD AUTO: 42.3 % (ref 40–53)
HGB BLD-MCNC: 13.7 G/DL (ref 13.3–17.7)
IMM GRANULOCYTES # BLD: 0 10E9/L (ref 0–0.4)
IMM GRANULOCYTES NFR BLD: 0.3 %
LYMPHOCYTES # BLD AUTO: 1.9 10E9/L (ref 0.8–5.3)
LYMPHOCYTES NFR BLD AUTO: 32.1 %
MAGNESIUM SERPL-MCNC: 2.1 MG/DL (ref 1.6–2.3)
MCH RBC QN AUTO: 31 PG (ref 26.5–33)
MCHC RBC AUTO-ENTMCNC: 32.4 G/DL (ref 31.5–36.5)
MCV RBC AUTO: 96 FL (ref 78–100)
MONOCYTES # BLD AUTO: 0.7 10E9/L (ref 0–1.3)
MONOCYTES NFR BLD AUTO: 11.5 %
NEUTROPHILS # BLD AUTO: 2.8 10E9/L (ref 1.6–8.3)
NEUTROPHILS NFR BLD AUTO: 47.7 %
NRBC # BLD AUTO: 0 10*3/UL
NRBC BLD AUTO-RTO: 0 /100
PHOSPHATE SERPL-MCNC: NORMAL MG/DL (ref 2.5–4.5)
PLATELET # BLD AUTO: 146 10E9/L (ref 150–450)
POTASSIUM SERPL-SCNC: ABNORMAL MMOL/L (ref 3.4–5.3)
PREALB SERPL IA-MCNC: 21 MG/DL (ref 15–45)
PROT SERPL-MCNC: 7.6 G/DL (ref 6.8–8.8)
RBC # BLD AUTO: 4.42 10E12/L (ref 4.4–5.9)
SODIUM SERPL-SCNC: 137 MMOL/L (ref 133–144)
TRIGL SERPL-MCNC: 75 MG/DL
WBC # BLD AUTO: 5.8 10E9/L (ref 4–11)

## 2017-03-20 PROCEDURE — 84478 ASSAY OF TRIGLYCERIDES: CPT | Performed by: SURGERY

## 2017-03-20 PROCEDURE — 82248 BILIRUBIN DIRECT: CPT | Performed by: SURGERY

## 2017-03-20 PROCEDURE — 85025 COMPLETE CBC W/AUTO DIFF WBC: CPT | Performed by: SURGERY

## 2017-03-20 PROCEDURE — 84134 ASSAY OF PREALBUMIN: CPT | Performed by: SURGERY

## 2017-03-20 PROCEDURE — 82550 ASSAY OF CK (CPK): CPT | Performed by: SURGERY

## 2017-03-20 PROCEDURE — 84100 ASSAY OF PHOSPHORUS: CPT | Performed by: SURGERY

## 2017-03-20 PROCEDURE — 83735 ASSAY OF MAGNESIUM: CPT | Performed by: SURGERY

## 2017-03-20 PROCEDURE — 80053 COMPREHEN METABOLIC PANEL: CPT | Performed by: SURGERY

## 2017-03-21 ENCOUNTER — DOCUMENTATION ONLY (OUTPATIENT)
Dept: SURGERY | Facility: CLINIC | Age: 47
End: 2017-03-21

## 2017-03-21 LAB
ALBUMIN SERPL-MCNC: 3.4 G/DL (ref 3.4–5)
ALBUMIN SERPL-MCNC: ABNORMAL G/DL (ref 3.4–5)
ALP SERPL-CCNC: 93 U/L (ref 40–150)
ALP SERPL-CCNC: ABNORMAL U/L (ref 40–150)
ALT SERPL W P-5'-P-CCNC: 22 U/L (ref 0–70)
ALT SERPL W P-5'-P-CCNC: ABNORMAL U/L (ref 0–70)
ANION GAP SERPL CALCULATED.3IONS-SCNC: 4 MMOL/L (ref 3–14)
ANION GAP SERPL CALCULATED.3IONS-SCNC: ABNORMAL MMOL/L (ref 3–14)
AST SERPL W P-5'-P-CCNC: 18 U/L (ref 0–45)
AST SERPL W P-5'-P-CCNC: ABNORMAL U/L (ref 0–45)
BILIRUB SERPL-MCNC: 0.3 MG/DL (ref 0.2–1.3)
BILIRUB SERPL-MCNC: ABNORMAL MG/DL (ref 0.2–1.3)
BUN SERPL-MCNC: 11 MG/DL (ref 7–30)
BUN SERPL-MCNC: ABNORMAL MG/DL (ref 7–30)
CALCIUM SERPL-MCNC: 8.5 MG/DL (ref 8.5–10.1)
CALCIUM SERPL-MCNC: ABNORMAL MG/DL (ref 8.5–10.1)
CHLORIDE SERPL-SCNC: 111 MMOL/L (ref 94–109)
CHLORIDE SERPL-SCNC: ABNORMAL MMOL/L (ref 94–109)
CK SERPL-CCNC: 59 U/L (ref 30–300)
CO2 SERPL-SCNC: 30 MMOL/L (ref 20–32)
CO2 SERPL-SCNC: ABNORMAL MMOL/L (ref 20–32)
CREAT SERPL-MCNC: 0.69 MG/DL (ref 0.66–1.25)
CREAT SERPL-MCNC: ABNORMAL MG/DL (ref 0.66–1.25)
GFR SERPL CREATININE-BSD FRML MDRD: ABNORMAL ML/MIN/1.7M2
GFR SERPL CREATININE-BSD FRML MDRD: ABNORMAL ML/MIN/1.7M2
GLUCOSE SERPL-MCNC: 92 MG/DL (ref 70–99)
GLUCOSE SERPL-MCNC: ABNORMAL MG/DL (ref 70–99)
POTASSIUM SERPL-SCNC: 3.8 MMOL/L (ref 3.4–5.3)
POTASSIUM SERPL-SCNC: ABNORMAL MMOL/L (ref 3.4–5.3)
PROT SERPL-MCNC: 7.1 G/DL (ref 6.8–8.8)
PROT SERPL-MCNC: ABNORMAL G/DL (ref 6.8–8.8)
SODIUM SERPL-SCNC: 145 MMOL/L (ref 133–144)
SODIUM SERPL-SCNC: ABNORMAL MMOL/L (ref 133–144)

## 2017-03-21 PROCEDURE — 82550 ASSAY OF CK (CPK): CPT | Performed by: SURGERY

## 2017-03-21 PROCEDURE — 80053 COMPREHEN METABOLIC PANEL: CPT | Performed by: SURGERY

## 2017-03-21 NOTE — PROGRESS NOTES
Spring Ambrose, [facility]  Francis Vyas MD; Gabbi Thompson MD; Fifi Zarco MD                   Greetings,     I wanted to inform you (in case you were notified via EPIC/other means) that Parker Acevedo's labs from 3/20/17 were grossly hemolyzed with a potassium value >10.     I spoke to the pt and he did not endorse any new/concerning symptoms. I have ordered for a CMP to be repeated as soon as possible.       Let me know if you have any questions.       Spring Ambrose, Pharm.D.   [facility] Clinical Pharmacist   Desk Phone: [phone]   Main Phone: [phone]   Fax: [phone]

## 2017-03-22 DIAGNOSIS — Z98.1 S/P CERVICAL SPINAL FUSION: Primary | ICD-10-CM

## 2017-03-23 ENCOUNTER — MYC MEDICAL ADVICE (OUTPATIENT)
Dept: FAMILY MEDICINE | Facility: CLINIC | Age: 47
End: 2017-03-23

## 2017-03-23 DIAGNOSIS — G47.00 PERSISTENT INSOMNIA: ICD-10-CM

## 2017-03-23 DIAGNOSIS — F90.0 ADHD (ATTENTION DEFICIT HYPERACTIVITY DISORDER), INATTENTIVE TYPE: ICD-10-CM

## 2017-03-23 DIAGNOSIS — F41.9 CHRONIC ANXIETY: ICD-10-CM

## 2017-03-23 RX ORDER — DEXTROAMPHETAMINE SACCHARATE, AMPHETAMINE ASPARTATE, DEXTROAMPHETAMINE SULFATE AND AMPHETAMINE SULFATE 5; 5; 5; 5 MG/1; MG/1; MG/1; MG/1
20 TABLET ORAL DAILY
Qty: 30 TABLET | Refills: 0 | Status: SHIPPED | OUTPATIENT
Start: 2017-06-05 | End: 2017-04-06

## 2017-03-23 RX ORDER — DEXTROAMPHETAMINE SACCHARATE, AMPHETAMINE ASPARTATE, DEXTROAMPHETAMINE SULFATE AND AMPHETAMINE SULFATE 5; 5; 5; 5 MG/1; MG/1; MG/1; MG/1
20 TABLET ORAL DAILY
Qty: 30 TABLET | Refills: 0 | Status: SHIPPED | OUTPATIENT
Start: 2017-04-05 | End: 2017-04-06

## 2017-03-23 RX ORDER — DEXTROAMPHETAMINE SACCHARATE, AMPHETAMINE ASPARTATE, DEXTROAMPHETAMINE SULFATE AND AMPHETAMINE SULFATE 5; 5; 5; 5 MG/1; MG/1; MG/1; MG/1
20 TABLET ORAL DAILY
Qty: 30 TABLET | Refills: 0 | Status: SHIPPED | OUTPATIENT
Start: 2017-05-05 | End: 2017-04-06

## 2017-03-23 RX ORDER — DEXTROAMPHETAMINE SACCHARATE, AMPHETAMINE ASPARTATE, DEXTROAMPHETAMINE SULFATE AND AMPHETAMINE SULFATE 5; 5; 5; 5 MG/1; MG/1; MG/1; MG/1
20 TABLET ORAL 2 TIMES DAILY
Qty: 60 TABLET | Refills: 0 | Status: CANCELLED | OUTPATIENT
Start: 2017-03-23

## 2017-03-23 RX ORDER — DIAZEPAM 5 MG
TABLET ORAL
Qty: 60 TABLET | Refills: 2 | Status: SHIPPED | OUTPATIENT
Start: 2017-04-03 | End: 2017-04-11

## 2017-03-23 NOTE — TELEPHONE ENCOUNTER
diazepam (VALIUM) 5 MG tablet      Last Written Prescription Date:  1/3/2017  Last Fill Quantity: 60,   # refills: 2  Last Office Visit with [facility], [facility] or [facility] prescribing provider: 3/2/2017  Future Office visit:    Next 5 appointments (look out 90 days)     Mar 30, 2017  1:00 PM CDT   Return Visit with Aissatou Wilson PA-C   [facility] ([address] [address]   [phone]            Apr 05, 2017  2:30 PM CDT   Return Visit with Patti Milligan MD   [facility] ([facility])    [address] [id number] [phone]            Apr 17, 2017 11:00 AM CDT   Office Visit with Esteban Daly MD   [facility] ([facility])    [address]  [facility] [address] [phone]                   Routing refill request to provider for review/approval because:  Drug not on the [facility], [facility] or [facility] refill protocol or controlled substance    amphetamine-dextroamphetamine (ADDERALL) 20 MG per tablet      Last Written Prescription Date:  3/6/2017  Last Fill Quantity: 60,   # refills: 0  Last Office Visit with [facility], [facility] or [facility] prescribing provider: 3/2/2017  Future Office visit:    Next 5 appointments (look out 90 days)     Mar 30, 2017  1:00 PM CDT   Return Visit with Aissatou Wilson PA-C   [facility])    [address] [place] [address]   [phone]            Apr 05, 2017  2:30 PM CDT   Return Visit with Patti Milligan MD   [facility] ([facility])    [address] [id number] [phone]            Apr 17, 2017 11:00 AM CDT   Office Visit with Esteban Daly MD   [facility] ([facility])    [address]  [facility] [address]   [phone]                    Routing refill request to provider for review/approval because:  Drug not on the [facility] or [facility] refill protocol or controlled substance

## 2017-03-23 NOTE — TELEPHONE ENCOUNTER
Reviewed requests.    Adderall 20 mg twice daily will be refilled for 3 months-- based on last refill, the next refill would not be before April 5, 2017.    Valium 5 mg prescription printed and can be picked up. This can be refilled as of April 3, 2017 based on previous prescriptions.     Clinic visit be due in mid May 2017.     Esteban Daly MD    Please close encounter when call/work completed.

## 2017-03-27 ENCOUNTER — CARE COORDINATION (OUTPATIENT)
Dept: CARE COORDINATION | Facility: CLINIC | Age: 47
End: 2017-03-27

## 2017-03-27 ENCOUNTER — MYC MEDICAL ADVICE (OUTPATIENT)
Dept: INFECTIOUS DISEASES | Facility: CLINIC | Age: 47
End: 2017-03-27

## 2017-03-27 NOTE — PROGRESS NOTES
Clinic Care Coordination Contact  D/I: Clinical Data: RN CC called to f/u with pt after recent ED visit and subsequent f/u appointments. Patient seen by ID and a PICC has been placed for TPN/medication administration. Recently started on Bactrim as well.[facility] is following. Per wife, Rose. Patient has been throwing up (patient states he was throwing up about 14X/day but now 6-8 X/day. Describes throwing up as mostly dry heaving. When he does bring something up it is small amounts of bile. He did not quantify amount of emesis). He is not having fevers and no obvious signs of infection at Port/PICC sites or other. He does c/o itching at port and PICC sites. They did not run TPN last night as patient thought throwing up R/T this but it did not change without TPN. They also held Bactrim without change. Discussed importance of not stopping Bactrim abruptly. Advised to contact ID if things do not improve and to have HC RN assess when visit made tomorrow. Wife plans to contact ID to get further instruction. They will resume Bactrim and run TPN tonight.  P: Wife will f/u with ID. Will call CC if further assistance needed  RN CC will F/U in 2-3 weeks.    Kyle MAYN,RN- BC  Clinic Care Coordinator  [facility]  Phone: [phone]

## 2017-03-27 NOTE — TELEPHONE ENCOUNTER
I called Ms. Rose Acevedo.  She said that Parker has been having a lot of nausea and vomiting.  Not much abdominal pain.  I told them that it is okay to hold the Bactrim for now.  It is possible that it is a Bactrim side effect.  However, because this severity of nausea and vomiting I think that he needs an evaluation beyond just stopping the Bactrim, ideally through his primary clinic.  I have sent a message to Ms. Mancera in the  clinic to coordinate this.  I advised Ms. Acevedo to contact me if she has not heard anything by tomorrow.     Gabbi Thompson MD  Division of Infectious Diseases and International Medicine  [phone]

## 2017-03-28 ENCOUNTER — CARE COORDINATION (OUTPATIENT)
Dept: CARE COORDINATION | Facility: CLINIC | Age: 47
End: 2017-03-28

## 2017-03-28 NOTE — PROGRESS NOTES
Clinic Care Coordination Contact  Care Team Conversations  D/I: This RN CC received message from ID MD, Dr. Thompson, stating that she has been in contact with pt/wife and gave OK to hold Bactrim at this time. She would like patient to be seen in PCC to rule out possibilities for continued vomiting. Placed call to wife, Rose, to discuss this. Per Rose, she held Bactrim but did run TPN last night. States that Parker continued to vomit bile overnight. She does not know how much as she is not in the BR with him. Discussed need to be seen at [facility] even if regular provider is not available. Rose verbalized understanding and acknowledged discussing with ID physician. She plans to call to make an appointment today. Patient has a neurosurgery f/u later this week.  P: Patient will see PC provider this week.       Warm hand-off given to regular RN CC, Melissa Behl, who will follow up next week.    Kyle MAYN,RN- BC  Clinic Care Coordinator  [facility]  Phone: [phone]

## 2017-03-30 ENCOUNTER — OFFICE VISIT (OUTPATIENT)
Dept: NEUROSURGERY | Facility: CLINIC | Age: 47
End: 2017-03-30
Payer: COMMERCIAL

## 2017-03-30 ENCOUNTER — RADIANT APPOINTMENT (OUTPATIENT)
Dept: GENERAL RADIOLOGY | Facility: CLINIC | Age: 47
End: 2017-03-30
Attending: PHYSICIAN ASSISTANT
Payer: COMMERCIAL

## 2017-03-30 VITALS — HEIGHT: 72 IN | BODY MASS INDEX: 23.3 KG/M2 | TEMPERATURE: 98.8 F | WEIGHT: 172 LBS

## 2017-03-30 DIAGNOSIS — Z98.1 S/P CERVICAL SPINAL FUSION: ICD-10-CM

## 2017-03-30 DIAGNOSIS — Z98.1 S/P CERVICAL SPINAL FUSION: Primary | ICD-10-CM

## 2017-03-30 PROCEDURE — 99024 POSTOP FOLLOW-UP VISIT: CPT | Performed by: PHYSICIAN ASSISTANT

## 2017-03-30 PROCEDURE — 72040 X-RAY EXAM NECK SPINE 2-3 VW: CPT | Mod: TC

## 2017-03-30 ASSESSMENT — PAIN SCALES - GENERAL: PAINLEVEL: NO PAIN (0)

## 2017-03-30 NOTE — NURSING NOTE
Chief Complaint   Patient presents with     Surgical Followup     cervical fusion DOS 02/15/17     Neurologic Problem       Initial Temp 98.8  F (37.1  C) (Temporal)  Ht 1.829 m (6')  Wt 78 kg (172 lb)  BMI 23.33 kg/m2 Estimated body mass index is 23.33 kg/(m^2) as calculated from the following:    Height as of this encounter: 1.829 m (6').    Weight as of this encounter: 78 kg (172 lb).  Medication Reconciliation: complete     Bianca Dawn CMA (AAMA)

## 2017-03-30 NOTE — PROGRESS NOTES
[facility]  Neurosurgery followup:    HPI: 6 weeks s/p C5-6 ACDF. He is doing well and states the numbness he had into his hands has completely resolved. He is happy with how he is progressing.    Exam:  Constitutional:  Alert, well nourished, NAD.  HEENT: Normocephalic, atraumatic.   Pulm:  Without shortness of breath   CV:  No pitting edema of BLE.     Neurological:  Awake  Alert  Oriented x 3  Motor exam:     Shoulder Abduction:  Right:  5/5    Left:  5/5  Biceps:                      Right:  5/5    Left:  5/5  Triceps:                     Right:  5/5    Left:  5/5  Wrist Extensors:       Right:  5/5    Left:  5/5  Wrist Flexors:           Right:  5/5    Left:  5/5  Intrinsics:                  Right:  5/5    Left:  5/5     Able to spontaneously move U/E bilaterally  Sensation intact throughout all U/E dermatomes  Incision: Healing nicely.  Imaging: AP and lateral films reveal stable and intact hardware.  A/P: 6 weeks s/p C5-6 ACDF. Doing well. He will followup in 6 weeks with xray prior. Call the clinic at [phone] for increasing redness, swelling or pus draining from the incision, increased pain or any other questions and concerns.        Aissatou Wilson PA-C  [facility]  [address] [address]    Tel [phone]  Pager [phone]

## 2017-03-30 NOTE — PROGRESS NOTES
Parker Acevedo Sr is a 46 year old male who presents for:  Chief Complaint   Patient presents with     Surgical Followup     cervical fusion DOS 02/15/17     Neurologic Problem        Initial Vitals:  Temp 98.8  F (37.1  C) (Temporal)  Ht 1.829 m (6')  Wt 78 kg (172 lb)  BMI 23.33 kg/m2 Estimated body mass index is 23.33 kg/(m^2) as calculated from the following:    Height as of this encounter: 1.829 m (6').    Weight as of this encounter: 78 kg (172 lb).. Body surface area is 1.99 meters squared. BP completed using cuff size: NA (Not Taken)  No Pain (0)    Do you feel safe in your environment?  Yes  Do you need any refills today? No    Nursing Comments:         Bianca Dawn CMA ([facility])

## 2017-03-30 NOTE — MR AVS SNAPSHOT
After Visit Summary   3/30/2017    Parker Acevedo     MRN: [id number]           Patient Information     Date Of Birth          1970        Visit Information        Provider Department      3/30/2017 1:00 PM Aissatou Wilson PA-C [facility]        Today's Diagnoses     S/P cervical spinal fusion    -  1       Follow-ups after your visit        Your next 10 appointments already scheduled     Apr 05, 2017  2:30 PM CDT   Return Visit with Patti Milligan MD   [facility] ([facility])    [address] [facility] [id number] [phone]            Apr 17, 2017 11:00 AM CDT   Office Visit with Esteban Daly MD   [facility] [place] ([facility])    [address]  [facility] [id number] [phone]           Bring a current list of meds and any records pertaining to this visit.  For Physicals, please bring immunization records and any forms needing to be filled out.  Please arrive 10 minutes early to complete paperwork.            Apr 27, 2017 11:30 AM CDT   (Arrive by 11:15 AM)   Return Visit with Gabbi Thompson MD   [facility] ([facility])    [address] [place] [id number] [phone]              Future tests that were ordered for you today     Open Future Orders        Priority Expected Expires Ordered    XR Cervical Spine 2/3 Views Routine 5/11/2017 3/30/2018 3/30/2017            Who to contact     If you have questions or need follow up information about today's clinic visit or your schedule please contact [facility] directly at [phone].  Normal or non-critical lab and imaging results will be communicated to you by MyChart, letter or phone within 4 business days after the clinic has received the results. If you do not hear from us within 7 days,  please contact the clinic through [email or web address] or phone. If you have a critical or abnormal lab result, we will notify you by phone as soon as possible.  Submit refill requests through [email or web address] or call your pharmacy and they will forward the refill request to us. Please allow 3 business days for your refill to be completed.          Additional Information About Your Visit        [employer] Information     [email or web address] gives you secure access to your electronic health record. If you see a primary care provider, you can also send messages to your care team and make appointments. If you have questions, please call your primary care clinic.  If you do not have a primary care provider, please call [phone] and they will assist you.        Care EveryWhere ID     This is your Care EveryWhere ID. This could be used by other organizations to access your [place] medical records  [id number]        Your Vitals Were     Temperature Height BMI (Body Mass Index)             98.8  F (37.1  C) (Temporal) 1.829 m (6') 23.33 kg/m2          Blood Pressure from Last 3 Encounters:   03/16/17 132/72   03/12/17 124/85   02/16/17 119/71    Weight from Last 3 Encounters:   03/30/17 78 kg (172 lb)   03/16/17 88.2 kg (194 lb 6.4 oz)   02/15/17 88 kg (194 lb 0.1 oz)               Primary Care Provider Office Phone # Fax #    Esteban Daly [phone]       [facility] [address] [facility] [address]        Thank you!     Thank you for choosing [facility]  for your care. Our goal is always to provide you with excellent care. Hearing back from our patients is one way we can continue to improve our services. Please take a few minutes to complete the written survey that you may receive in the mail after your visit with us. Thank you!             Your Updated Medication List - Protect others around you: Learn how to safely use, store and throw away your medicines at www.disposemymeds.org.         "  This list is accurate as of: 3/30/17  3:36 PM.  Always use your most recent med list.                   Brand Name Dispense Instructions for use    * amphetamine-dextroamphetamine 20 MG per tablet    ADDERALL    60 tablet    Take 1 tablet (20 mg) by mouth 2 times daily       * amphetamine-dextroamphetamine 20 MG per tablet   Start taking on:  4/5/2017    ADDERALL    30 tablet    Take 1 tablet (20 mg) by mouth daily       * amphetamine-dextroamphetamine 20 MG per tablet   Start taking on:  5/5/2017    ADDERALL    30 tablet    Take 1 tablet (20 mg) by mouth daily       * amphetamine-dextroamphetamine 20 MG per tablet   Start taking on:  6/5/2017    ADDERALL    30 tablet    Take 1 tablet (20 mg) by mouth daily       cyanocobalamin 1000 MCG/ML injection    VITAMIN B12    1 mL    Inject 1 mL (1,000 mcg) into the muscle every 30 days       diazepam 5 MG tablet   Start taking on:  4/3/2017    VALIUM    60 tablet    5 mg each morning and 5 mg at bedtime       * [place] HOME INFUSION MANAGED PATIENT      Contact [facility] for patient specific medication information at [phone] on admission and discharge from the hospital.  Phones are answered 24 hours a day 7 days a week 365 days a year.  Providers - Choose \"CONTINUE HOME MED (no script)\" at discharge if patient treatment with home infusion will continue.       * [facility] HOME INFUSION MANAGED PATIENT      Contact [facility] for patient specific medication information at [phone] on admission and discharge from the hospital.  Phones are answered 24 hours a day 7 days a week 365 days a year.  Providers - Choose \"CONTINUE HOME MED (no script)\" at discharge if patient treatment with home infusion will continue.       fentaNYL 50 mcg/hr 72 hr patch    DURAGESIC    15 patch    Place 1 patch onto the skin every 48 hours MYLAN BRAND ONLY. Fill on/after 3/24/17 to start on/after 3/27/17       lidocaine-prilocaine cream    EMLA    30 g    Apply " "topically as needed for moderate pain       morphine 0.1% in intrasite topical gel     100 g    Apply as needed prior to accessing the port site.       mupirocin 2 % ointment    BACTROBAN    30 g    Apply topically 3 times daily       Needle (Disp) 27G X 1/2\" Misc    BD DISP NEEDLES    3 each    1 Device every 30 days Use for cyanocobalamin injection once q 30 days.       nystatin-triamcinolone cream    MYCOLOG II    60 g    Apply topically 2 times daily as needed       ondansetron 8 MG ODT tab    ZOFRAN-ODT    90 tablet    Take 1 tablet (8 mg) by mouth every 8 hours as needed for nausea       * order for DME     12 each    Injection Supplies for Vitamin B12: 3cc syringes w/ 27 gauge needles, 1/2 inch length       * order for DME     4 each    Equipment being ordered: Bilateral knee high chronic venous insufficiency stockings--  mild-moderate pressures.       oxyCODONE 5 MG/5ML solution    ROXICODONE    1350 mL    Take 10-15 mLs (10-15 mg) by mouth every 4 hours as needed for moderate to severe pain Max of 45mg per day. Fill on/after 3/24/17 not to start till 3/26/17.       senna-docusate 8.6-50 MG per tablet    SENOKOT-S;PERICOLACE    120 tablet    Take 1-2 tablets by mouth 2 times daily as needed for constipation       sucralfate 1 GM/10ML suspension    CARAFATE    1200 mL    Take 10 mLs (1 g) by mouth 4 times daily       sulfamethoxazole-trimethoprim suspension    BACTRIM/SEPTRA    1120 mL    Take 20 mLs (160 mg) by mouth 2 times daily for 28 days Dose based on TMP component.       vitamin D 52223 UNIT capsule    ERGOCALCIFEROL    12 capsule    Take 1 capsule (50,000 Units) by mouth every 7 days       * Notice:  This list has 8 medication(s) that are the same as other medications prescribed for you. Read the directions carefully, and ask your doctor or other care provider to review them with you.      "

## 2017-04-04 ENCOUNTER — CARE COORDINATION (OUTPATIENT)
Dept: CARE COORDINATION | Facility: CLINIC | Age: 47
End: 2017-04-04

## 2017-04-04 NOTE — PROGRESS NOTES
Clinic Care Coordination Contact  OUTREACH    Referral Information:  Referral Source: IP/TCU Report  Reason for Contact: RN CC call to spouse for follow up.  Care Conference: No     Universal Utilization:   ED Visits in last year: 14  Hospital visits in last year: 5  Last PCP appointment: 03/02/17  Missed Appointments: 1  Concerns: yes, high utilization  Multiple Providers or Specialists: yes    Clinical Concerns:  Current Medical Concerns: Patient's spouse Rose reports patient has been doing well and stopped vomiting once he stopped the Bactrim.  Rose states patient also had a rash when on Bactrim, which is resolving since stopping Bactrim and spouse applying hydrocortisone.  Rose states the next step is to update Dr. Thompson regarding a new antibiotic.  Patient continues to receive home care infusion services through [facility] once/week for PICC line management.    Current Behavioral Concerns: none addressed this encounter.    Education Provided to patient: RN CC educated patient's spouse Rose on importance of following up with Dr. Thompson as advised.   Clinical Pathway Name: None  Clinical Pathway: None    Medication Management:  Spouse manages patient's medications.       Functional Status:  Mobility Status: Independent  Equipment Currently Used at Home: cane, straight  Transportation: Spouse provides           Psychosocial:  Current living arrangement:: I live in a private home with spouse  Financial/Insurance: Patient and spouse have until the end of April to find a new place to live.  Spouse states she is working with a Realtor in finding a new home.  Spouse states she had one home they were going to purchase in [place], however, she states that fell through.  Rose declines needing any assistance with housing from care coordination at this time.        Resources and Interventions:  Current Resources: Home Care, Home Infusion; Other (see comment) ([facility] resolving  simone)        Advanced Care Plans/Directives on file:: Yes        Goals:   Goal 1 Statement: I, the spouse, will call infectious disease doctor about needing new antibiotic  Goal 1 Progression Percent: 0%  Goal 1 Progression Date: 04/04/17              Barriers: Spouse and patient have multiple stressors, including trying to find a new home by the end of this month.  Strengths: Spouse has followed through on goals in the past   Patient/Caregiver understanding: Spouse Rose verbalizes importance of contacting Dr. Thompson, Infectious Disease specialist regarding update since stopping Bactrim and to inquire about a new antibiotic.  Frequency of Care Coordination: every 2-4 weeks  Upcoming appointment: 04/17/17 (PCP)     Plan:   Patient will continue to follow treatment plan as directed and follow up with PCP with concerns ongoing.   Rose will contact Infectious Disease specialist Dr. Thompson today to determine plan.  RN CC will follow up with patient in 2 weeks.    Melissa Behl BSN, RN, PHN  [facility] Care Coordinator  [phone]  [email or web address]

## 2017-04-05 ENCOUNTER — OFFICE VISIT (OUTPATIENT)
Dept: PALLIATIVE MEDICINE | Facility: CLINIC | Age: 47
End: 2017-04-05
Payer: COMMERCIAL

## 2017-04-05 VITALS
DIASTOLIC BLOOD PRESSURE: 89 MMHG | BODY MASS INDEX: 27.3 KG/M2 | SYSTOLIC BLOOD PRESSURE: 130 MMHG | HEIGHT: 71 IN | WEIGHT: 195 LBS | HEART RATE: 85 BPM

## 2017-04-05 DIAGNOSIS — G89.29 CHRONIC ABDOMINAL PAIN: ICD-10-CM

## 2017-04-05 DIAGNOSIS — E46 MALNUTRITION (H): ICD-10-CM

## 2017-04-05 DIAGNOSIS — Z51.81 ENCOUNTER FOR MONITORING OPIOID MAINTENANCE THERAPY: Primary | ICD-10-CM

## 2017-04-05 DIAGNOSIS — Z51.81 ENCOUNTER FOR MONITORING OPIOID MAINTENANCE THERAPY: ICD-10-CM

## 2017-04-05 DIAGNOSIS — R10.9 CHRONIC ABDOMINAL PAIN: ICD-10-CM

## 2017-04-05 DIAGNOSIS — Z79.891 ENCOUNTER FOR MONITORING OPIOID MAINTENANCE THERAPY: Primary | ICD-10-CM

## 2017-04-05 DIAGNOSIS — Z79.891 ENCOUNTER FOR MONITORING OPIOID MAINTENANCE THERAPY: ICD-10-CM

## 2017-04-05 DIAGNOSIS — M54.12 CERVICAL RADICULOPATHY: Primary | ICD-10-CM

## 2017-04-05 PROCEDURE — 99000 SPECIMEN HANDLING OFFICE-LAB: CPT | Performed by: PSYCHIATRY & NEUROLOGY

## 2017-04-05 PROCEDURE — 99214 OFFICE O/P EST MOD 30 MIN: CPT | Performed by: PSYCHIATRY & NEUROLOGY

## 2017-04-05 PROCEDURE — 80307 DRUG TEST PRSMV CHEM ANLYZR: CPT | Mod: 90 | Performed by: PSYCHIATRY & NEUROLOGY

## 2017-04-05 ASSESSMENT — PAIN SCALES - GENERAL: PAINLEVEL: MODERATE PAIN (4)

## 2017-04-05 NOTE — LETTER
April 10, 2017      Parker Shawn Prettyvignesh   [address] [facility] [address]              Dear Parker,    I received a message from  concerning the use of Valium. She is concerned that we taper off Valium completely given the increased doses of opioid therapy. She has likely discussed this with you. With the level of opiate therapy that you will be receiving, benzodiazepines need to be discontinued totally due to potential drug interactions.    Reduce the dose of Valium currently to only 5 mg daily whether that be in the morning or evening. Over the next month or so, we will look to discontinue this completely after tapering. After one month on Valium 5 mg daily, I would suggest lowering this to Valium 2.5 mg daily and then discontinuing in 6-8 weeks from now.    Please call with any questions or concerns and thank you for your confidence.          Sincerely,        Esteban Daly MD

## 2017-04-05 NOTE — MR AVS SNAPSHOT
After Visit Summary   4/5/2017    Parker Acevedo     MRN: [id number]           Patient Information     Date Of Birth          1970        Visit Information        Provider Department      4/5/2017 2:30 PM Patti Milligan MD [facility]        Today's Diagnoses     Encounter for monitoring opioid maintenance therapy    -  1      Care Instructions    1. Urine drug screen today  2. Follow up 3 months  3. Try to decrease the valium to 1/day.        Follow-ups after your visit        Your next 10 appointments already scheduled     Apr 05, 2017  3:00 PM CDT   LAB with BE LAB   [facility] ([facility])    [address] [facility] [id number] [phone]           Patient must bring picture ID.  Patient should be prepared to give a urine specimen  Please do not eat 10-12 hours before your appointment if you are coming in fasting for labs on lipids, cholesterol, or glucose (sugar).  Pregnant women should follow their Care Team instructions. Water with medications is okay. Do not drink coffee or other fluids.   If you have concerns about taking  your medications, please ask at office or if scheduling via [employer], send a message by clicking on Secure Messaging, Message Your Care Team.            Apr 17, 2017 11:00 AM CDT   Office Visit with Esteban Daly MD   [facility] [place] ([facility])    [address]  [place] [id number] [phone]           Bring a current list of meds and any records pertaining to this visit.  For Physicals, please bring immunization records and any forms needing to be filled out.  Please arrive 10 minutes early to complete paperwork.            Apr 27, 2017 11:30 AM CDT   (Arrive by 11:15 AM)   Return Visit with Gabbi Thompson MD   [facility] ([facility])    [address] [place] [address]  "[facility] [address] [phone]              Who to contact     If you have questions or need follow up information about today's clinic visit or your schedule please contact [facility] directly at [phone].  Normal or non-critical lab and imaging results will be communicated to you by MyChart, letter or phone within 4 business days after the clinic has received the results. If you do not hear from us within 7 days, please contact the clinic through [employer] or phone. If you have a critical or abnormal lab result, we will notify you by phone as soon as possible.  Submit refill requests through [employer] or call your pharmacy and they will forward the refill request to us. Please allow 3 business days for your refill to be completed.          Additional Information About Your Visit        [employer] Information     [employer] gives you secure access to your electronic health record. If you see a primary care provider, you can also send messages to your care team and make appointments. If you have questions, please call your primary care clinic.  If you do not have a primary care provider, please call [phone] and they will assist you.        Care EveryWhere ID     This is your Care EveryWhere ID. This could be used by other organizations to access your [place] medical records  [id number]        Your Vitals Were     Pulse Height BMI (Body Mass Index)             85 1.803 m (5' 11\") 27.2 kg/m2          Blood Pressure from Last 3 Encounters:   04/05/17 130/89   03/16/17 132/72   03/12/17 124/85    Weight from Last 3 Encounters:   04/05/17 88.5 kg (195 lb)   03/30/17 78 kg (172 lb)   03/16/17 88.2 kg (194 lb 6.4 oz)              We Performed the Following     Drug Screen Comprehensive , Urine with Reported Meds (Pain Care Package)        Primary Care Provider Office Phone # Fax #    Esteban Daly [phone]       [facility] [place] [address] [facility]  [place] " "[place] [address]        Thank you!     Thank you for choosing [facility]  for your care. Our goal is always to provide you with excellent care. Hearing back from our patients is one way we can continue to improve our services. Please take a few minutes to complete the written survey that you may receive in the mail after your visit with us. Thank you!             Your Updated Medication List - Protect others around you: Learn how to safely use, store and throw away your medicines at www.disposemymeds.org.          This list is accurate as of: 4/5/17  2:50 PM.  Always use your most recent med list.                   Brand Name Dispense Instructions for use    * amphetamine-dextroamphetamine 20 MG per tablet    ADDERALL    60 tablet    Take 1 tablet (20 mg) by mouth 2 times daily       * amphetamine-dextroamphetamine 20 MG per tablet    ADDERALL    30 tablet    Take 1 tablet (20 mg) by mouth daily       * amphetamine-dextroamphetamine 20 MG per tablet   Start taking on:  5/5/2017    ADDERALL    30 tablet    Take 1 tablet (20 mg) by mouth daily       * amphetamine-dextroamphetamine 20 MG per tablet   Start taking on:  6/5/2017    ADDERALL    30 tablet    Take 1 tablet (20 mg) by mouth daily       cyanocobalamin 1000 MCG/ML injection    VITAMIN B12    1 mL    Inject 1 mL (1,000 mcg) into the muscle every 30 days       diazepam 5 MG tablet    VALIUM    60 tablet    5 mg each morning and 5 mg at bedtime       * [place] HOME INFUSION MANAGED PATIENT      Contact [facility] for patient specific medication information at [phone] on admission and discharge from the hospital.  Phones are answered 24 hours a day 7 days a week 365 days a year.  Providers - Choose \"CONTINUE HOME MED (no script)\" at discharge if patient treatment with home infusion will continue.       * [facility] HOME INFUSION MANAGED PATIENT      Contact [facility] for patient specific medication information at [phone] " "on admission and discharge from the hospital.  Phones are answered 24 hours a day 7 days a week 365 days a year.  Providers - Choose \"CONTINUE HOME MED (no script)\" at discharge if patient treatment with home infusion will continue.       fentaNYL 50 mcg/hr 72 hr patch    DURAGESIC    15 patch    Place 1 patch onto the skin every 48 hours MYLAN BRAND ONLY. Fill on/after 3/24/17 to start on/after 3/27/17       lidocaine-prilocaine cream    EMLA    30 g    Apply topically as needed for moderate pain       morphine 0.1% in intrasite topical gel     100 g    Apply as needed prior to accessing the port site.       mupirocin 2 % ointment    BACTROBAN    30 g    Apply topically 3 times daily       Needle (Disp) 27G X 1/2\" Misc    BD DISP NEEDLES    3 each    1 Device every 30 days Use for cyanocobalamin injection once q 30 days.       nystatin-triamcinolone cream    MYCOLOG II    60 g    Apply topically 2 times daily as needed       ondansetron 8 MG ODT tab    ZOFRAN-ODT    90 tablet    Take 1 tablet (8 mg) by mouth every 8 hours as needed for nausea       * order for DME     12 each    Injection Supplies for Vitamin B12: 3cc syringes w/ 27 gauge needles, 1/2 inch length       * order for DME     4 each    Equipment being ordered: Bilateral knee high chronic venous insufficiency stockings--  mild-moderate pressures.       oxyCODONE 5 MG/5ML solution    ROXICODONE    1350 mL    Take 10-15 mLs (10-15 mg) by mouth every 4 hours as needed for moderate to severe pain Max of 45mg per day. Fill on/after 3/24/17 not to start till 3/26/17.       senna-docusate 8.6-50 MG per tablet    SENOKOT-S;PERICOLACE    120 tablet    Take 1-2 tablets by mouth 2 times daily as needed for constipation       sucralfate 1 GM/10ML suspension    CARAFATE    1200 mL    Take 10 mLs (1 g) by mouth 4 times daily       sulfamethoxazole-trimethoprim suspension    BACTRIM/SEPTRA    1120 mL    Take 20 mLs (160 mg) by mouth 2 times daily for 28 days Dose " based on TMP component.       vitamin D 09264 UNIT capsule    ERGOCALCIFEROL    12 capsule    Take 1 capsule (50,000 Units) by mouth every 7 days       * Notice:  This list has 8 medication(s) that are the same as other medications prescribed for you. Read the directions carefully, and ask your doctor or other care provider to review them with you.

## 2017-04-05 NOTE — Clinical Note
Given his higher doses of opioids, I am hoping you can continue to work on weaning his benzos.  He thinks he can do 1/day, and I advised him it would be safest to wean off with time.  Will ask you to please work on this with him.

## 2017-04-05 NOTE — NURSING NOTE
"Chief Complaint   Patient presents with     Pain     Pain all over neck, arm, and abdominal        Initial /89  Pulse 85  Ht 1.803 m (5' 11\")  Wt 88.5 kg (195 lb)  BMI 27.2 kg/m2 Estimated body mass index is 27.2 kg/(m^2) as calculated from the following:    Height as of this encounter: 1.803 m (5' 11\").    Weight as of this encounter: 88.5 kg (195 lb).  Medication Reconciliation: complete     Lyndsay Solo MA      "

## 2017-04-05 NOTE — PROGRESS NOTES
[facility]    Date of visit: 4/5/2017     Chief complaint:    Chief Complaint   Patient presents with     Pain     Pain all over neck, arm, and abdominal        Interval history:  Parker Acevedo was last seen by me on 11/14/16    Recommendations/plan at the last visit included:  1. Again, discussed smoking cessation goals.  Decreased from 1/2 pack/day to 3-4 cigarettes/day.  He did contact [employer] regarding plans available for quitting and he is set up with a telephone counselor and nicotine patches. Goal is to continue decrease use.    2. No changes to medications- refills for opioids given today.  I discussed that we will slowly begin weaning off his opioids after we have made some effort with smoking cessation.   He needs to discuss further tapering of Valium with his PCP.  3. Follow up 3 months with Dr. Milligan  4. Discussed with patient that he needs to talk with his other providers about his port, as it appears he is using despite note stating that he shouldn't be.  5. Port site dressed after port was dislodged.    Since his last visit, Parker Acevedo reports:  - he did have a cervical spine decompression at C5-6 ACDF for neck and left arm pain about one month ago with Dr. Campos, [facility]. He has a follow up in a few weeks. He is wearing a C collar.  - His abdominal pain is mostly unchanged.  - He had a PICC line placed for fluids and TPN.   - The port is still in place but there is a question of an infection.   - 4/27/17 with infectious disease for a possible port infection. They are unsure if he has a infection at this time. He does have a temperature of 100 deg F at nights.    Pain scores:  Pain intensity  5/10    Current pain treatments:   Oxycodone 10-15 mg liquid by G tube every 8 hours, total of 35-45 mg daily  Fentanyl 50mcg/hr patch q48 hours  Lidocaine patches  Valium 5 mg/day, 1 tab in AM and PM- not every day  Naloxone-  "has from previous hospitalization.    Previous medication treatments included:   Tylenol #3   Vicodin   Tramadol   Diazepam- for sleep/anxiety  Gabapentin- bad headaches, acid reflux  Oxycodone max of 45mg/day.      Side Effects: no side effects    Medications:  Current Outpatient Prescriptions   Medication Sig Dispense Refill     diazepam (VALIUM) 5 MG tablet 5 mg each morning and 5 mg at bedtime 60 tablet 2     [START ON 6/5/2017] amphetamine-dextroamphetamine (ADDERALL) 20 MG per tablet Take 1 tablet (20 mg) by mouth daily 30 tablet 0     [START ON 5/5/2017] amphetamine-dextroamphetamine (ADDERALL) 20 MG per tablet Take 1 tablet (20 mg) by mouth daily 30 tablet 0     amphetamine-dextroamphetamine (ADDERALL) 20 MG per tablet Take 1 tablet (20 mg) by mouth daily 30 tablet 0     fentaNYL (DURAGESIC) 50 mcg/hr 72 hr patch Place 1 patch onto the skin every 48 hours MYLAN BRAND ONLY. Fill on/after 3/24/17 to start on/after 3/27/17 15 patch 0     oxyCODONE (ROXICODONE) 5 MG/5ML solution Take 10-15 mLs (10-15 mg) by mouth every 4 hours as needed for moderate to severe pain Max of 45mg per day. Fill on/after 3/24/17 not to start till 3/26/17. 1350 mL 0     lidocaine-prilocaine (EMLA) cream Apply topically as needed for moderate pain 30 g 3     [place] [facility] INFUSION MANAGED PATIENT Contact [facility] for patient specific medication information at [phone] on admission and discharge from the hospital.  Phones are answered 24 hours a day 7 days a week 365 days a year.    Providers - Choose \"CONTINUE HOME MED (no script)\" at discharge if patient treatment with home infusion will continue.       mupirocin (BACTROBAN) 2 % ointment Apply topically 3 times daily 30 g 0     morphine 0.1% in intrasite topical gel Apply as needed prior to accessing the port site. 100 g 0     Needle, Disp, (BD DISP NEEDLES) 27G X 1/2\" MISC 1 Device every 30 days Use for cyanocobalamin injection once q 30 days. 3 each 4     " "ondansetron (ZOFRAN-ODT) 8 MG ODT tab Take 1 tablet (8 mg) by mouth every 8 hours as needed for nausea 90 tablet 3     vitamin D (ERGOCALCIFEROL) 68726 UNIT capsule Take 1 capsule (50,000 Units) by mouth every 7 days 12 capsule 3     sucralfate (CARAFATE) 1 GM/10ML suspension Take 10 mLs (1 g) by mouth 4 times daily 1200 mL 11     cyanocobalamin (VITAMIN B12) 1000 MCG/ML injection Inject 1 mL (1,000 mcg) into the muscle every 30 days 1 mL 11     amphetamine-dextroamphetamine (ADDERALL) 20 MG per tablet Take 1 tablet (20 mg) by mouth 2 times daily 60 tablet 0     nystatin-triamcinolone (MYCOLOG II) cream Apply topically 2 times daily as needed 60 g 5     order for DME Equipment being ordered: Bilateral knee high chronic venous insufficiency stockings--  mild-moderate pressures. 4 each 5     [facility] INFUSION MANAGED PATIENT Contact [facility] for patient specific medication information at [phone] on admission and discharge from the hospital.  Phones are answered 24 hours a day 7 days a week 365 days a year.    Providers - Choose \"CONTINUE HOME MED (no script)\" at discharge if patient treatment with home infusion will continue.       senna-docusate (SENOKOT-S;PERICOLACE) 8.6-50 MG per tablet Take 1-2 tablets by mouth 2 times daily as needed for constipation 120 tablet 11     order for DME Injection Supplies for Vitamin B12: 3cc syringes w/ 27 gauge needles, 1/2 inch length 12 each 0     sulfamethoxazole-trimethoprim (BACTRIM/SEPTRA) suspension Take 20 mLs (160 mg) by mouth 2 times daily for 28 days Dose based on TMP component. (Patient not taking: Reported on 4/5/2017) 1120 mL 0     [DISCONTINUED] NO ACTIVE MEDICATIONS . 0 0       Medical History: any changes in medical history since they were last seen? C5-6 ACDF    Review of Systems:  The 14 system ROS was reviewed from the intake questionnaire, and is positive for: dizziness, headache, n/v, nosebleeds, itching, rash, swelling in feet, back " "pain and neck pain, numbness/tingling, arthritis, vision changes  Any bowel or bladder problems: diarrhea, bladder normal  Mood: Baseline, with anxiety and stress.    Physical Exam:  Blood pressure 130/89, pulse 85, height 1.803 m (5' 11\"), weight 88.5 kg (195 lb).  General: awake, alert, C collar in place  Gait: Slow  MSK exam: hunched posture      THE 4 A's OF OPIOID MAINTENANCE ANALGESIA    Analgesia: good    Activity: on disability    Adverse effects: none    Adherence to Rx protocol: good- [place] Prescription Monitoring Program checked 4/6/17- appropriate      Assessment:   1. Chronic abdominal pain, with history of gastric bypass and later peptic ulcer   2. G tube placement- only used for venting  3. Myofascial abdominal pain, with significant guarding and poor posture  4. Opioid tolerance  5. Anxiety  6. Foot pain with tendonous injury- healed  7. Left arm weakness, consistent with radial nerve injury- improving  8. Port placement- recurrent infections        Plan:   1. Again, discussed smoking cessation goals.  Decreased from 1/2 pack/day to 2-3 cigarettes/day.  He did contact [employer] and was set up with a  telephone counselor and nicotine patches. Continue current plan for cessation. Reports he has had increased stress and has not reduced his use from the last appt.    2. No changes to medications- will wait on further wean as he heals from surgery   3. He needs to discuss further tapering of Valium with his PCP. He isn't regularly using 2/day of the valium, so is going to try to decrease to 1/day  4. He has naloxone prescription from a previous hospitalization.  Advised him to let me know when expires..   5. Follow up 3 months    Maday Ayon DO  Pain Medicine Fellow     I saw and examined the patient with the Pain Fellow/Resident. I have reviewed and agree with the resident's note and plan of care and made changes and corrections directly to the body of the note.    TIME SPENT:  BY " FELLOW/RESIDENT ALONE 15 MIN  BY MYSELF AND FELLOW/RESIDENT TOGETHER 0 MIN  BY MYSELF WITHOUT THE FELLOW/RESIDENT 25 MIN    These times included 15 minutes I spent counseling him about his diagnosis and treatment options and coordination of care with the primary team    Jessica Milligan MD  [facility]

## 2017-04-06 ENCOUNTER — TELEPHONE (OUTPATIENT)
Dept: FAMILY MEDICINE | Facility: CLINIC | Age: 47
End: 2017-04-06

## 2017-04-06 DIAGNOSIS — F90.0 ADHD (ATTENTION DEFICIT HYPERACTIVITY DISORDER), INATTENTIVE TYPE: ICD-10-CM

## 2017-04-06 RX ORDER — DEXTROAMPHETAMINE SACCHARATE, AMPHETAMINE ASPARTATE, DEXTROAMPHETAMINE SULFATE AND AMPHETAMINE SULFATE 5; 5; 5; 5 MG/1; MG/1; MG/1; MG/1
20 TABLET ORAL 2 TIMES DAILY
Qty: 60 TABLET | Refills: 0 | Status: SHIPPED | OUTPATIENT
Start: 2017-04-06 | End: 2017-04-06

## 2017-04-06 RX ORDER — DEXTROAMPHETAMINE SACCHARATE, AMPHETAMINE ASPARTATE, DEXTROAMPHETAMINE SULFATE AND AMPHETAMINE SULFATE 5; 5; 5; 5 MG/1; MG/1; MG/1; MG/1
20 TABLET ORAL DAILY
Qty: 60 TABLET | Refills: 0 | Status: SHIPPED | OUTPATIENT
Start: 2017-06-05 | End: 2017-04-06

## 2017-04-06 RX ORDER — DEXTROAMPHETAMINE SACCHARATE, AMPHETAMINE ASPARTATE, DEXTROAMPHETAMINE SULFATE AND AMPHETAMINE SULFATE 5; 5; 5; 5 MG/1; MG/1; MG/1; MG/1
20 TABLET ORAL 2 TIMES DAILY
Qty: 60 TABLET | Refills: 0 | Status: SHIPPED | OUTPATIENT
Start: 2017-06-05 | End: 2017-08-01

## 2017-04-06 RX ORDER — DEXTROAMPHETAMINE SACCHARATE, AMPHETAMINE ASPARTATE, DEXTROAMPHETAMINE SULFATE AND AMPHETAMINE SULFATE 5; 5; 5; 5 MG/1; MG/1; MG/1; MG/1
20 TABLET ORAL 2 TIMES DAILY
Qty: 60 TABLET | Refills: 0 | Status: SHIPPED | OUTPATIENT
Start: 2017-05-05 | End: 2017-08-01

## 2017-04-06 RX ORDER — DEXTROAMPHETAMINE SACCHARATE, AMPHETAMINE ASPARTATE, DEXTROAMPHETAMINE SULFATE AND AMPHETAMINE SULFATE 5; 5; 5; 5 MG/1; MG/1; MG/1; MG/1
20 TABLET ORAL 2 TIMES DAILY
Qty: 60 TABLET | Refills: 0 | Status: SHIPPED | OUTPATIENT
Start: 2017-04-06 | End: 2017-05-09

## 2017-04-06 NOTE — TELEPHONE ENCOUNTER
Corrected prescriptions generated for .    Esteban Daly MD  Please close encounter when call/work completed.

## 2017-04-06 NOTE — TELEPHONE ENCOUNTER
Reason for Call:  Medication or medication refill:    Do you use a [facility]?  Name of the pharmacy and phone number for the current request:  [facility] - [phone]    Name of the medication requested: Adderal is was prescribed at 30 tabs but he gets refills for 60 please call and correct this.    Can we leave a detailed message on this number? YES    Phone number patient can be reached at: Home number on file [phone] (home)    Best Time: ANY    Call taken on 4/6/2017 at 11:48 AM by Kristen Nicole

## 2017-04-07 LAB — PAIN DRUG SCR UR W RPTD MEDS: NORMAL

## 2017-04-07 NOTE — TELEPHONE ENCOUNTER
Patient's wife brought back two of the scripts for #30. Start dates for: may 5 and June 5    Shredded these 2 scripts

## 2017-04-09 ENCOUNTER — TELEPHONE (OUTPATIENT)
Dept: OTHER | Facility: CLINIC | Age: 47
End: 2017-04-09

## 2017-04-09 PROCEDURE — 87040 BLOOD CULTURE FOR BACTERIA: CPT | Mod: 91 | Performed by: SURGERY

## 2017-04-09 NOTE — TELEPHONE ENCOUNTER
I received a call from Mr. Acevedo's wife.  He has been feeling poorly for the last few days with fatigue, nausea and chills. Today he developed fever (highest temp 101.4).  His line is functioning fine. He is not tachycardic and has a normal blood pressure (she has taken it multiple times today).  She is wondering if I can call [facility] to provide orders for blood cultures.  She did not want to take him to the ER or urgent care since this is not uncommon for him.  She provided me with the contact info of his homecare nurse Anastasiia ([phone]). I contacted her to review his case. She reported that he was fine when she saw him 2 days ago.  She was not planning on seeing him today but will work on getting a nurse out to assess him and draw blood cultures. She will  the patient and his wife to go to the ED if fevers persist of if he develops any other signs/sx of worsening infection.  I will update his primary ID physician Dr. Thompson regarding today's events.     La Puckett MD  [phone]

## 2017-04-10 ENCOUNTER — TELEPHONE (OUTPATIENT)
Dept: INFECTIOUS DISEASES | Facility: CLINIC | Age: 47
End: 2017-04-10

## 2017-04-10 ENCOUNTER — TELEPHONE (OUTPATIENT)
Dept: FAMILY MEDICINE | Facility: CLINIC | Age: 47
End: 2017-04-10

## 2017-04-10 NOTE — TELEPHONE ENCOUNTER
Mr. Acevedo has been having some fever.  I called to discuss with his primary care giver, his wife Rose.  She said that he has been doing okay overall but developed these fevers along with some chest and back pain.  His home care nurse saw him yesterday and took blood cultures that are not yet growing anything.  He has also developed a small blister on his should that had a small amount of redness around it.  She tata around it and the redness has not been progressing. He has an appointment with his primary care physician tomorrow. I discussed with him whether he needed more urgent care, but she thinks that he will be fine until tomorrow.  She will seek more urgent care if the blister area progresses.  I have planned follow up in a few weeks.  I will keep an eye on the chart to determine if I need to see him more urgently.     Gabbi Thompson MD  Division of Infectious Diseases and International Medicine  [phone]

## 2017-04-10 NOTE — PROGRESS NOTES
SUBJECTIVE:                                                    Parker Acevedo Sr is a 46 year old male who presents to clinic today for the following health issues:  Off Bactrim     Blister by Pic line on right shoulder, dime size.    CHEST PAIN/Back Pain  Aspirated-    Had reflux of bile-containing material 2 days ago with acute right-sided chest pain, progressive cough and a feeling of dyspnea. He had fevers above 101  for a few days. No fever today but still has a mildly productive cough with less dyspnea. He is certain that he aspirated.  Its difficult for him to produce mucus. His chest pain is diminished and essentially resolved.       Onset: Sun morning    Description:   Location:  entire chest from front to back  Character: hurts to cough wheezing,  Radiation: across entire chest and between shoulder blades  Duration: feels full and tight, thinks it may be from aspiration.     Intensity: moderate 5-6/10    Progression of Symptoms:  Monday had a temp of 102, pt states he is getting worse.    Accompanying Signs & Symptoms:  Shortness of breath: YES  Sweating: just gets hot the chills  Nausea/vomiting: YES  Lightheadedness: YES  Palpitations: YES  Fever/Chills: YES  Cough: YES  Heartburn: YES- bile backup    History:   Family history of heart disease YES- father, and uncle  Tobacco use: YES    Precipitating factors:   Worse with exertion: YES  Worse with deep breaths :  YES  Related to food: no    Alleviating factors:  Zofran       Therapies Tried and outcome: Zofran, Carafate        Depression and Anxiety Follow-Up    Status since last visit: No change.     Other associated symptoms: Chronic pain also associated with anxiety along with mild depression. Currently not on antidepressants.    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     Current substance abuse: None    PHQ-9 SCORE  2/9/2017 2/13/2017 4/11/2017   Total Score - - -   Total Score MyChart - - -   Total Score 0 8 9     CIERRA-7 SCORE 2/9/2017 2/13/2017 4/11/2017   Total Score - - -   Total Score - - -   Total Score 7 2 2        PHQ-9  English      PHQ-9   Any Language     GAD7       Chronic Pain Follow-Up--follows with [facility].       Type / Location of Pain:  Chronic abdominal pain resulting from postoperative changes.  Analgesia/pain control:       Recent changes:  same      Overall control: Tolerable with discomfort  Activity level/function:      Daily activities:  Unable to perform most daily activities - chores, hobbies, social activities, driving    Work:  Unable to work  Adverse effects:  No  Adherance    Taking medication as directed?  Yes    Participating in other treatments: yes  Risk Factors:    Sleep:  Fair    Mood/anxiety:  controlled    Recent family or social stressors:  Ongoing financial challenges.    Other aggravating factors: No definite aggravating factors noted  PHQ-9 SCORE 2/9/2017 2/13/2017 4/11/2017   Total Score - - -   Total Score MyChart - - -   Total Score 0 8 9     CIERRA-7 SCORE 2/9/2017 2/13/2017 4/11/2017   Total Score - - -   Total Score - - -   Total Score 7 2 2     Encounter-Level CSA - 11/14/2016:                 Controlled Substance Agreement - Scan on 11/25/2016 12:17 PM : CONTROLLED SUBSTANCE AGREEMENT (below)          Encounter-Level CSA - 11/14/2016:                 Controlled Substance Agreement - Scan on 12/28/2015 12:33 PM : CONTROLLED SUBSTANCE AGREEMENT 12/10/15 (below)          Encounter-Level CSA - 11/14/2016:                 Controlled Substance Agreement - Scan on 11/24/2014  7:30 AM : Controlled Medication Agreement 10/20/14 (below)                 Problem list and histories reviewed & adjusted, as indicated.  Additional history: as documented        Reviewed and updated as needed this visit by clinical staff       Reviewed and updated as needed this visit by Provider      "    ROS:  CONSTITUTIONAL: As noted above with fever chills from aspiration. Slow weight loss due to inability to use his port. Receives hydration only the present time.  INTEGUMENTARY/SKIN: Noted to a asymptomatic single vesicular lesion on his right anterior shoulder region. No pain or itching. Has not an a area close to the indwelling lines.  E: NEGATIVE for vision changes or irritation  E/M: NEGATIVE for ear, mouth and throat problems  RESP:as above  B: NEGATIVE for masses, tenderness or discharge  CV: NEGATIVE for chest pain, palpitations or peripheral edema  GI: Recently had a stress echocardiogram which showed no definite areas of ischemia but a global hypokinetic left ventricle with an ejection fraction at 45-50% at rest. With stress the left ventricular ejection fraction increased to greater than 65% and left ventricular size decreased appropriately. No ECG evidence of ischemia noted. This is similar to one in January 2016 a decrease in ejection fraction from approximately 2 years ago. Patient has no history of hypertension. No history of cardiac issues such as anginal symptoms, orthopnea or PND. He has not had a formal cardiac evaluation for evaluation and further recommendations.  : NEGATIVE for frequency, dysuria, or hematuria  M: NEGATIVE for significant arthralgias or myalgia  N: NEGATIVE for weakness, dizziness or paresthesias  E: NEGATIVE for temperature intolerance, skin/hair changes  H: NEGATIVE for bleeding problems  PSYCHIATRIC: Ongoing anxiety with the use of Valium. To decrease the use to a maximum of one Valium 5 mg per day.    OBJECTIVE:                                                    /62  Pulse 82  Temp 98.5  F (36.9  C) (Temporal)  Resp 14  Ht 5' 11\" (1.803 m)  Wt 174 lb (78.9 kg)  SpO2 97%  BMI 24.27 kg/m2  Body mass index is 24.27 kg/(m^2).  GENERAL: alert, no distress and cooperative  EYES: Eyes grossly normal to inspection, extraocular movements - intact, and " PERRL  HENT: ear canals- normal; TMs- normal; Nose- normal; Mouth- no ulcers, no lesions  NECK: no tenderness, no adenopathy, no asymmetry, no masses, no stiffness; thyroid- normal to palpation  NECK: No adenopathy and no carotid bruits noted  RESP: Reveal scattered rhonchi in the right lung field, posteriorly and laterally. No definite wheezing. Pulse oximetry 97%.  CV: regular rates and rhythm, normal S1 S2, no S3 or S4 and no murmur, no click or rub -  ABDOMEN: Multiple surgical incisions noted. NG tube placed for ventilation of the GI tract in his left upper quadrant. No distention but generally mildly tender.  MS: extremities- no gross deformities noted, no edema  SKIN: Isolated vesicular lesions on his anterior right shoulder. Open with sterile needle and culture taken. The fluid was clear there was no surrounding cellulitis. Suspect this is not infectious in etiology undetermined.  NEURO: strength and tone- normal, sensory exam- grossly normal, mentation- intact, speech- normal, reflexes- symmetric  BACK: no CVA tenderness, no paralumbar tenderness  PSYCH: Alert and oriented times 3; speech- coherent , normal rate and volume; able to articulate logical thoughts, able to abstract reason, no tangential thoughts, no hallucinations or delusions, affect- normal  LYMPHATICS: ant. cervical- normal, post. cervical- normal, axillary- normal, supraclavicular- normal, inguinal- normal    Diagnostic test results:  Diagnostic Test Results:  Chest x-ray confirmed new right middle lobe infiltrate consistent with aspiration pneumonia.      ASSESSMENT/PLAN:                                                    1. Aspiration pneumonitis (H)    Treat empirically with oral Levaquin 500 mg daily for 7 days. Follow-up in 3-4 weeks with a repeat chest x-ray.  - XR Chest 2 Views; Future  - levofloxacin (LEVAQUIN) 500 MG tablet; Take 1 tablet (500 mg) by mouth daily  Dispense: 7 tablet; Refill: 0  - albuterol (PROAIR HFA/PROVENTIL  HFA/VENTOLIN HFA) 108 (90 BASE) MCG/ACT Inhaler; Inhale 2 puffs into the lungs every 4 hours as needed for shortness of breath / dyspnea or wheezing  Dispense: 1 Inhaler; Refill: 3    2. Chronic anxiety    Discussed with the patient and to reduce Valium to 5 mg daily as a maximum. Reduce slowly over time given his opioid need.  - diazepam (VALIUM) 5 MG tablet; Take 1 tablet (5 mg) by mouth daily as needed for anxiety or sleep  Dispense: 30 tablet; Refill: 2    3. Vesicular rash   Exact etiology undetermined. Culture present to check for bacterial infection.  - Wound Culture Aerobic Bacterial    4. Vitamin B12 deficiency without anemia     Monthly injection given  - B12 - 1000 MCG  - INJECTION INTRAMUSCULAR OR SUB-Q    5. Bariatric surgery status    Monthly injection of B-12 given. Follow-up with his specialty care  - cyanocobalamin (VITAMIN B12) 1000 MCG/ML injection; Inject 1 mL (1,000 mcg) into the muscle every 30 days  Dispense: 1 mL; Refill: 11    6. ADHD (attention deficit hyperactivity disorder), inattentive type    Continue current Adderall therapy at 20 mg twice daily.    7. Abnormal echocardiogram   Review of his findings show that he has developed a decrease in left ventricular ejection fraction of 45-50% without overt signs of cardiac failure and no definite findings or symptoms of ischemic heart disease. Findings at rest with an increase of ejection fraction to 65% with stress. Stable from January 2016 but clearly different than 2 years ago. No valvular concerns noted. He has not had a cardiac consultation and this is not ordered. He likely needs to be considered for beta blockade therapy and possibly ACE inhibitor therapy. Will defer beginning a pharmacologic approach concerning treatment to his cardiologist.  - CARDIOLOGY EVAL ADULT REFERRAL      Follow up with Provider - Follow-up in 3-4 weeks for aspiration pneumonia follow-up in 3 months for routine care.   See Patient Instructions    The patient  understood the rational for the diagnosis and treatment plan. All questions were answered to best of my ability and the patient's satisfaction.    Esteban Daly MD  [facility]

## 2017-04-10 NOTE — TELEPHONE ENCOUNTER
"Parker Acevedo Sr is a 46 year old male who calls with report of chest pain.  Pain is 4-5/10 Started yesterday after aspirating \"large amounts of bile.\"  Patient has Gtube.  Hx of aspiration.  \"I think I swallowed quite a bit.\"  Has developed a slight cough. Reports difficulty breathing while aspirating, but no difficulty breathing now.  Notes that home nurse reports lung sounds are not clear, \"she heard stuff in the lungs.\"  Patient reports fever of 101.6.  Patient has PICC line.  Blood cultured for infection.  Results still pending, but no growth in > 11 hours.  No radiating pain    NURSING ASSESSMENT:  Description:  Aspiration  Onset/duration:  yesterday  Precip. factors:  GTube  Pain scale (0-10)   5/10  Last exam/Treatment:  03/02/2017  Allergies:   Allergies   Allergen Reactions     Bactrim [Sulfamethoxazole W/Trimethoprim] Rash     Penicillins Anaphylaxis     Doxycycline Rash     Vancomycin Rash     Rash after receiving vancomycin 3/28/16 (red man's?). Tolerated with slower infusion and diphenhydramine premed.     NURSING PLAN: Recommend clinic appointment for xray.  Huddled with LISETH for timing of appointment.  Not suspecting Cardiac cause.  Ok to schedule for tomorrow.  Patient to be seen in the ED sooner for difficulty breathing or worsening symptoms.      RECOMMENDED DISPOSITION:  See nursing plan  Will comply with recommendation: Yes  If further questions/concerns or if symptoms do not improve, worsen or new symptoms develop, call your PCP or [facility] as soon as possible.      Guideline used:chest pain, choking, cough  Telephone Triage Protocols for Nurses, Fourth Edition, Rand Joseph  Huddle with LISETH Soto RN    "

## 2017-04-11 ENCOUNTER — MYC MEDICAL ADVICE (OUTPATIENT)
Dept: FAMILY MEDICINE | Facility: CLINIC | Age: 47
End: 2017-04-11

## 2017-04-11 ENCOUNTER — MYC MEDICAL ADVICE (OUTPATIENT)
Dept: PALLIATIVE MEDICINE | Facility: CLINIC | Age: 47
End: 2017-04-11

## 2017-04-11 ENCOUNTER — RADIANT APPOINTMENT (OUTPATIENT)
Dept: GENERAL RADIOLOGY | Facility: CLINIC | Age: 47
End: 2017-04-11
Attending: FAMILY MEDICINE
Payer: COMMERCIAL

## 2017-04-11 ENCOUNTER — OFFICE VISIT (OUTPATIENT)
Dept: FAMILY MEDICINE | Facility: CLINIC | Age: 47
End: 2017-04-11
Payer: COMMERCIAL

## 2017-04-11 VITALS
OXYGEN SATURATION: 97 % | BODY MASS INDEX: 24.36 KG/M2 | HEART RATE: 82 BPM | HEIGHT: 71 IN | WEIGHT: 174 LBS | TEMPERATURE: 98.5 F | RESPIRATION RATE: 14 BRPM | SYSTOLIC BLOOD PRESSURE: 118 MMHG | DIASTOLIC BLOOD PRESSURE: 62 MMHG

## 2017-04-11 DIAGNOSIS — Z98.84 BARIATRIC SURGERY STATUS: ICD-10-CM

## 2017-04-11 DIAGNOSIS — E53.8 VITAMIN B12 DEFICIENCY WITHOUT ANEMIA: ICD-10-CM

## 2017-04-11 DIAGNOSIS — F41.9 CHRONIC ANXIETY: ICD-10-CM

## 2017-04-11 DIAGNOSIS — R10.9 CHRONIC ABDOMINAL PAIN: ICD-10-CM

## 2017-04-11 DIAGNOSIS — J69.0 ASPIRATION PNEUMONITIS (H): ICD-10-CM

## 2017-04-11 DIAGNOSIS — G89.29 CHRONIC ABDOMINAL PAIN: ICD-10-CM

## 2017-04-11 DIAGNOSIS — Z79.891 ENCOUNTER FOR LONG-TERM OPIATE ANALGESIC USE: ICD-10-CM

## 2017-04-11 DIAGNOSIS — R93.1 ABNORMAL ECHOCARDIOGRAM: ICD-10-CM

## 2017-04-11 DIAGNOSIS — R23.8 VESICULAR RASH: ICD-10-CM

## 2017-04-11 DIAGNOSIS — F90.0 ADHD (ATTENTION DEFICIT HYPERACTIVITY DISORDER), INATTENTIVE TYPE: ICD-10-CM

## 2017-04-11 DIAGNOSIS — J69.0 ASPIRATION PNEUMONITIS (H): Primary | ICD-10-CM

## 2017-04-11 PROCEDURE — 71020 XR CHEST 2 VW: CPT

## 2017-04-11 PROCEDURE — 87070 CULTURE OTHR SPECIMN AEROBIC: CPT | Performed by: FAMILY MEDICINE

## 2017-04-11 PROCEDURE — 99214 OFFICE O/P EST MOD 30 MIN: CPT | Mod: 25 | Performed by: FAMILY MEDICINE

## 2017-04-11 PROCEDURE — 96372 THER/PROPH/DIAG INJ SC/IM: CPT | Performed by: FAMILY MEDICINE

## 2017-04-11 RX ORDER — LEVOFLOXACIN 500 MG/1
500 TABLET, FILM COATED ORAL DAILY
Qty: 7 TABLET | Refills: 0 | Status: SHIPPED | OUTPATIENT
Start: 2017-04-11 | End: 2017-05-09

## 2017-04-11 RX ORDER — ALBUTEROL SULFATE 90 UG/1
2 AEROSOL, METERED RESPIRATORY (INHALATION) EVERY 4 HOURS PRN
Qty: 1 INHALER | Refills: 3 | Status: SHIPPED | OUTPATIENT
Start: 2017-04-11 | End: 2017-10-03

## 2017-04-11 RX ORDER — DIAZEPAM 5 MG
5 TABLET ORAL DAILY PRN
Qty: 30 TABLET | Refills: 2
Start: 2017-04-11 | End: 2017-07-06

## 2017-04-11 RX ORDER — CYANOCOBALAMIN 1000 UG/ML
1 INJECTION, SOLUTION INTRAMUSCULAR; SUBCUTANEOUS
Qty: 1 ML | Refills: 11 | OUTPATIENT
Start: 2017-04-11 | End: 2017-04-29

## 2017-04-11 ASSESSMENT — ANXIETY QUESTIONNAIRES
5. BEING SO RESTLESS THAT IT IS HARD TO SIT STILL: SEVERAL DAYS
GAD7 TOTAL SCORE: 2
1. FEELING NERVOUS, ANXIOUS, OR ON EDGE: NOT AT ALL
6. BECOMING EASILY ANNOYED OR IRRITABLE: NOT AT ALL
2. NOT BEING ABLE TO STOP OR CONTROL WORRYING: NOT AT ALL
3. WORRYING TOO MUCH ABOUT DIFFERENT THINGS: NOT AT ALL
7. FEELING AFRAID AS IF SOMETHING AWFUL MIGHT HAPPEN: NOT AT ALL

## 2017-04-11 ASSESSMENT — PAIN SCALES - GENERAL: PAINLEVEL: MODERATE PAIN (4)

## 2017-04-11 ASSESSMENT — PATIENT HEALTH QUESTIONNAIRE - PHQ9: 5. POOR APPETITE OR OVEREATING: SEVERAL DAYS

## 2017-04-11 NOTE — TELEPHONE ENCOUNTER
Routed to the nursing pool and to the MA pool to process refill(s).    Demetrice Yeh, RN-BSN  [facility]

## 2017-04-11 NOTE — NURSING NOTE
Prior to injection verified patient identity using patient's name and date of birth.  The following medication was given:     MEDICATION: Vitamin B12  1000mcg  ROUTE: IM  SITE: Deltoid - Right  DOSE: 1000mcg/ml  LOT #: [id number]  :  [employer]  EXPIRATION DATE:  09/30/18  NDC#: 9051-4480-95

## 2017-04-11 NOTE — MR AVS SNAPSHOT
After Visit Summary   4/11/2017    Parker Acevedo Sr    MRN: [id number]           Patient Information     Date Of Birth          1970        Visit Information        Provider Department      4/11/2017 10:20 AM Esteban Daly MD [facility]        Today's Diagnoses     Aspiration pneumonitis (H)    -  1    Chronic anxiety        Vesicular rash        Vitamin B12 deficiency without anemia        Bariatric surgery status        ADHD (attention deficit hyperactivity disorder), inattentive type        Abnormal echocardiogram          Care Instructions    These are new changes to your current plan of care based on today's visit:    Medications stopped    Medications to be started Levaquin 500 mg daily for seven days    Albuterol inhaler as prescribed   Change dose of this medication    New treatments        Follow up appointments:    1.  FOLLOW UP WITH YOUR PRIMARY CARE PROVIDER: 3 month(s) for clinic visit    2. FOLLOW UP WITH SPECIALIST: Call to schedule with one of the cardiologist at [place]    Esteban Daly MD            Follow-ups after your visit        Additional Services     CARDIOLOGY EVAL ADULT REFERRAL       Your provider has referred you to:  [facility]: [facility] [phone]   [email or web address]    Please be aware that coverage of these services is subject to the terms and limitations of your health insurance plan.  Call member services at your health plan with any benefit or coverage questions.      Type of Referral:  New Cardiology Consult    Timeframe requested:  Within 1 month    Please bring the following to your appointment:  >>   Any x-rays, CTs or MRIs which have been performed.  Contact the facility where they were done to arrange for  prior to your scheduled appointment.    >>   List of current medications  >>   This referral request   >>   Any  documents/labs given to you for this referral                  Follow-up notes from your care team     Return in about 3 months (around 7/11/2017) for Routine Visit.      Your next 10 appointments already scheduled     Apr 17, 2017 11:00 AM CDT   Office Visit with Esteban Daly MD   [facility] [place] [facility]    [address]  [place] [address] [phone]           Bring a current list of meds and any records pertaining to this visit.  For Physicals, please bring immunization records and any forms needing to be filled out.  Please arrive 10 minutes early to complete paperwork.            Apr 27, 2017 11:30 AM CDT   (Arrive by 11:15 AM)   Return Visit with Gabbi Thompson MD   [facility] ([facility])    [address]  [address] [facility] [address] [phone]            Jul 05, 2017  2:30 PM CDT   Return Visit with Patti Milligan MD   [facility] ([facility])    [address] [place] [address] [phone]              Who to contact     If you have questions or need follow up information about today's clinic visit or your schedule please contact [facility] directly at [phone].  Normal or non-critical lab and imaging results will be communicated to you by MyChart, letter or phone within 4 business days after the clinic has received the results. If you do not hear from us within 7 days, please contact the clinic through MyChart or phone. If you have a critical or abnormal lab result, we will notify you by phone as soon as possible.  Submit refill requests through [employer] or call your pharmacy and they will forward the refill request to us. Please allow 3 business days for your refill to be completed.          Additional Information About Your Visit        [employer] Information     [employer] gives  "you secure access to your electronic health record. If you see a primary care provider, you can also send messages to your care team and make appointments. If you have questions, please call your primary care clinic.  If you do not have a primary care provider, please call [phone] and they will assist you.        Care EveryWhere ID     This is your Care EveryWhere ID. This could be used by other organizations to access your [place] medical records  [id number]        Your Vitals Were     Pulse Temperature Respirations Height Pulse Oximetry BMI (Body Mass Index)    82 98.5  F (36.9  C) (Temporal) 14 5' 11\" (1.803 m) 97% 24.27 kg/m2       Blood Pressure from Last 3 Encounters:   04/11/17 118/62   04/05/17 130/89   03/16/17 132/72    Weight from Last 3 Encounters:   04/11/17 174 lb (78.9 kg)   04/05/17 195 lb (88.5 kg)   03/30/17 172 lb (78 kg)              We Performed the Following     CARDIOLOGY EVAL ADULT REFERRAL     Wound Culture Aerobic Bacterial          Today's Medication Changes          These changes are accurate as of: 4/11/17 11:23 AM.  If you have any questions, ask your nurse or doctor.               Start taking these medicines.        Dose/Directions    albuterol 108 (90 BASE) MCG/ACT Inhaler   Commonly known as:  PROAIR HFA/PROVENTIL HFA/VENTOLIN HFA   Used for:  Aspiration pneumonitis (H)   Started by:  Esteban Daly MD        Dose:  2 puff   Inhale 2 puffs into the lungs every 4 hours as needed for shortness of breath / dyspnea or wheezing   Quantity:  1 Inhaler   Refills:  3       levofloxacin 500 MG tablet   Commonly known as:  LEVAQUIN   Used for:  Aspiration pneumonitis (H)   Started by:  Esteban Daly MD        Dose:  500 mg   Take 1 tablet (500 mg) by mouth daily   Quantity:  7 tablet   Refills:  0         These medicines have changed or have updated prescriptions.        Dose/Directions    diazepam 5 MG tablet   Commonly known as:  VALIUM   This may have changed:    - " how much to take  - how to take this  - when to take this  - reasons to take this  - additional instructions   Used for:  Chronic anxiety   Changed by:  Esteban Daly MD        Dose:  5 mg   Take 1 tablet (5 mg) by mouth daily as needed for anxiety or sleep   Quantity:  30 tablet   Refills:  2            Where to get your medicines      These medications were sent to [facility] [place], [place] - [address]  [address] [address]     Phone:  [phone]     albuterol 108 (90 BASE) MCG/ACT Inhaler    levofloxacin 500 MG tablet         Some of these will need a paper prescription and others can be bought over the counter.  Ask your nurse if you have questions.     Bring a paper prescription for each of these medications     morphine 0.1% in intrasite topical gel       You don't need a prescription for these medications     cyanocobalamin 1000 MCG/ML injection    diazepam 5 MG tablet                Primary Care Provider Office Phone # Fax #    Esteban Daly [phone]       [facility] [address] [place] [address]        Thank you!     Thank you for choosing [facility]  for your care. Our goal is always to provide you with excellent care. Hearing back from our patients is one way we can continue to improve our services. Please take a few minutes to complete the written survey that you may receive in the mail after your visit with us. Thank you!             Your Updated Medication List - Protect others around you: Learn how to safely use, store and throw away your medicines at www.disposemymeds.org.          This list is accurate as of: 4/11/17 11:23 AM.  Always use your most recent med list.                   Brand Name Dispense Instructions for use    albuterol 108 (90 BASE) MCG/ACT Inhaler    PROAIR HFA/PROVENTIL HFA/VENTOLIN HFA    1 Inhaler    Inhale 2 puffs into the lungs every 4 hours as needed for shortness of  "breath / dyspnea or wheezing       * amphetamine-dextroamphetamine 20 MG per tablet    ADDERALL    60 tablet    Take 1 tablet (20 mg) by mouth 2 times daily       * amphetamine-dextroamphetamine 20 MG per tablet    ADDERALL    60 tablet    Take 1 tablet (20 mg) by mouth 2 times daily       * amphetamine-dextroamphetamine 20 MG per tablet   Start taking on:  5/5/2017    ADDERALL    60 tablet    Take 1 tablet (20 mg) by mouth 2 times daily       * amphetamine-dextroamphetamine 20 MG per tablet   Start taking on:  6/5/2017    ADDERALL    60 tablet    Take 1 tablet (20 mg) by mouth 2 times daily       cyanocobalamin 1000 MCG/ML injection    VITAMIN B12    1 mL    Inject 1 mL (1,000 mcg) into the muscle every 30 days       diazepam 5 MG tablet    VALIUM    30 tablet    Take 1 tablet (5 mg) by mouth daily as needed for anxiety or sleep       * [facility]NAGED PATIENT      Contact [facility] for patient specific medication information at [phone] on admission and discharge from the hospital.  Phones are answered 24 hours a day 7 days a week 365 days a year.  Providers - Choose \"CONTINUE HOME MED (no script)\" at discharge if patient treatment with home infusion will continue.       * [facility]NAGED PATIENT      Contact [facility] for patient specific medication information at [phone] on admission and discharge from the hospital.  Phones are answered 24 hours a day 7 days a week 365 days a year.  Providers - Choose \"CONTINUE HOME MED (no script)\" at discharge if patient treatment with home infusion will continue.       fentaNYL 50 mcg/hr 72 hr patch    DURAGESIC    15 patch    Place 1 patch onto the skin every 48 hours MYLAN BRAND ONLY. Fill on/after 3/24/17 to start on/after 3/27/17       levofloxacin 500 MG tablet    LEVAQUIN    7 tablet    Take 1 tablet (500 mg) by mouth daily       lidocaine-prilocaine cream    EMLA    30 g    Apply topically as needed " "for moderate pain       morphine 0.1% in intrasite topical gel     100 g    Apply as needed prior to accessing the port site.       mupirocin 2 % ointment    BACTROBAN    30 g    Apply topically 3 times daily       Needle (Disp) 27G X 1/2\" Misc    BD DISP NEEDLES    3 each    1 Device every 30 days Use for cyanocobalamin injection once q 30 days.       nystatin-triamcinolone cream    MYCOLOG II    60 g    Apply topically 2 times daily as needed       ondansetron 8 MG ODT tab    ZOFRAN-ODT    90 tablet    Take 1 tablet (8 mg) by mouth every 8 hours as needed for nausea       * order for DME     12 each    Injection Supplies for Vitamin B12: 3cc syringes w/ 27 gauge needles, 1/2 inch length       * order for DME     4 each    Equipment being ordered: Bilateral knee high chronic venous insufficiency stockings--  mild-moderate pressures.       oxyCODONE 5 MG/5ML solution    ROXICODONE    1350 mL    Take 10-15 mLs (10-15 mg) by mouth every 4 hours as needed for moderate to severe pain Max of 45mg per day. Fill on/after 3/24/17 not to start till 3/26/17.       senna-docusate 8.6-50 MG per tablet    SENOKOT-S;PERICOLACE    120 tablet    Take 1-2 tablets by mouth 2 times daily as needed for constipation       sucralfate 1 GM/10ML suspension    CARAFATE    1200 mL    Take 10 mLs (1 g) by mouth 4 times daily       sulfamethoxazole-trimethoprim suspension    BACTRIM/SEPTRA    1120 mL    Take 20 mLs (160 mg) by mouth 2 times daily for 28 days Dose based on TMP component.       vitamin D 81900 UNIT capsule    ERGOCALCIFEROL    12 capsule    Take 1 capsule (50,000 Units) by mouth every 7 days       * Notice:  This list has 8 medication(s) that are the same as other medications prescribed for you. Read the directions carefully, and ask your doctor or other care provider to review them with you.      "

## 2017-04-11 NOTE — PATIENT INSTRUCTIONS
These are new changes to your current plan of care based on today's visit:    Medications stopped    Medications to be started Levaquin 500 mg daily for seven days    Albuterol inhaler as prescribed   Change dose of this medication    New treatments        Follow up appointments:    1.  FOLLOW UP WITH YOUR PRIMARY CARE PROVIDER: 3 month(s) for clinic visit    2. FOLLOW UP WITH SPECIALIST: Call to schedule with one of the cardiologist at [place]    Esteban Daly MD

## 2017-04-11 NOTE — TELEPHONE ENCOUNTER
My chart message from pt:    its like acid back up only his is bio and also to let you know he seen Dr Daly today and had a chest xray done and he also has pneumonia on the right side

## 2017-04-11 NOTE — TELEPHONE ENCOUNTER
Stoney sent to pt:  Okay thanks for informing us.  What is a 'bio back up?    Demetrice Yeh RN-BSN  [facility]

## 2017-04-11 NOTE — NURSING NOTE
"Chief Complaint   Patient presents with     Chest Pain     Back Pain     Blister     Panel Management     phq, roberto       Initial /62  Pulse 82  Temp 98.5  F (36.9  C) (Temporal)  Resp 14  Ht 5' 11\" (1.803 m)  Wt 174 lb (78.9 kg)  SpO2 97%  BMI 24.27 kg/m2 Estimated body mass index is 24.27 kg/(m^2) as calculated from the following:    Height as of this encounter: 5' 11\" (1.803 m).    Weight as of this encounter: 174 lb (78.9 kg).  Medication Reconciliation: complete    "

## 2017-04-11 NOTE — TELEPHONE ENCOUNTER
Medication refill information reviewed.     Due date for Fentanyl is 4/26/2017 Oxycodone is 4/25/2017    Prescriptions prepped for review.     Will route to provider.

## 2017-04-11 NOTE — TELEPHONE ENCOUNTER
Per patient Tomasa message:    Created: 4/11/2017 1:39 PM      I just wanted to let you know that on Sunday April 9 2017 I had to take Parker's patch off do to he was having trouble breathing do to a bio back up but I wasn't for sure what was going on right away so later that day I replaced it so he might just run short a day sooner than schedule but I just wanted to let you know Thanks again Rose and if you have any questions feel free to call me at [phone]

## 2017-04-11 NOTE — TELEPHONE ENCOUNTER
Received call from patient requesting refill(s) of  fentaNYL (DURAGESIC) 50 mcg/hr 72 hr patch              oxyCODONE (ROXICODONE) 5 MG/5ML solution    Last picked up from pharmacy on: both on 03/24/17    Pt last seen by prescribing provider on 04/05/17  Next appt scheduled for 07/05/17    Last urine drug screen date 04/05/17  Current opioid agreement on file (completed within the last year) Yes Date of opioid agreement: 11/14/16    Processing (pick one and delete the others):      Mail to [facility]   [address] [address]    Will route to nursing pool for review and preparation of prescription(s).

## 2017-04-12 RX ORDER — OXYCODONE HCL 5 MG/5 ML
10-15 SOLUTION, ORAL ORAL EVERY 4 HOURS PRN
Qty: 1350 ML | Refills: 0 | Status: SHIPPED | OUTPATIENT
Start: 2017-04-12 | End: 2017-05-12

## 2017-04-12 RX ORDER — FENTANYL 50 UG/1
1 PATCH TRANSDERMAL
Qty: 15 PATCH | Refills: 0 | Status: SHIPPED | OUTPATIENT
Start: 2017-04-12 | End: 2017-05-12

## 2017-04-12 ASSESSMENT — PATIENT HEALTH QUESTIONNAIRE - PHQ9: SUM OF ALL RESPONSES TO PHQ QUESTIONS 1-9: 9

## 2017-04-12 ASSESSMENT — ANXIETY QUESTIONNAIRES: GAD7 TOTAL SCORE: 2

## 2017-04-12 NOTE — TELEPHONE ENCOUNTER
Signed Rx's will mail from [place] on 04/13/17. Mailing to [place], [place]. Voicemail left for patient.

## 2017-04-12 NOTE — TELEPHONE ENCOUNTER
Signed Prescriptions:                        Disp   Refills    fentaNYL (DURAGESIC) 50 mcg/hr 72 hr patch 15 pat*0        Sig: Place 1 patch onto the skin every 48 hours MYLAN           BRAND ONLY. Fill on/after 4/24/17 to start           on/after 4/26/17  Authorizing Provider: BRANDYN JENKINS    oxyCODONE (ROXICODONE) 5 MG/5ML solution   1350 mL0        Sig: Take 10-15 mLs (10-15 mg) by mouth every 4 hours as           needed for moderate to severe pain Max of 45mg           per day. Fill on/after 4/24/17 not to start till           4/25/17.  Authorizing Provider: BRANDYN JENKINS MD  [facility]

## 2017-04-13 ENCOUNTER — TELEPHONE (OUTPATIENT)
Dept: FAMILY MEDICINE | Facility: CLINIC | Age: 47
End: 2017-04-13

## 2017-04-13 LAB
BACTERIA SPEC CULT: NO GROWTH
MICRO REPORT STATUS: NORMAL
SPECIMEN SOURCE: NORMAL

## 2017-04-13 NOTE — TELEPHONE ENCOUNTER
Karo with [facility] called in regards to pt. Pt states he was told to hold off on getting TPN due to an infection. Karo is wondering if this is accurate and needs to discuss with provider. Please call Karo at [phone]. Louann Bojorquez CMA (AAMA)

## 2017-04-13 NOTE — TELEPHONE ENCOUNTER
Forms received from [facility] - controlled substance review form - prescriber response    Completed by provider and faxed back to [phone]

## 2017-04-13 NOTE — TELEPHONE ENCOUNTER
Duong Huddleston-- informed that it appears that no active infection is present--appears safe to proceed with TPN.    Esteban Daly MD

## 2017-04-14 LAB
ALBUMIN SERPL-MCNC: 3.2 G/DL (ref 3.4–5)
ALP SERPL-CCNC: 71 U/L (ref 40–150)
ALT SERPL W P-5'-P-CCNC: 13 U/L (ref 0–70)
ANION GAP SERPL CALCULATED.3IONS-SCNC: 7 MMOL/L (ref 3–14)
AST SERPL W P-5'-P-CCNC: 10 U/L (ref 0–45)
BASOPHILS # BLD AUTO: 0 10E9/L (ref 0–0.2)
BASOPHILS NFR BLD AUTO: 0.5 %
BILIRUB DIRECT SERPL-MCNC: <0.1 MG/DL (ref 0–0.2)
BILIRUB SERPL-MCNC: 0.4 MG/DL (ref 0.2–1.3)
BUN SERPL-MCNC: 8 MG/DL (ref 7–30)
CALCIUM SERPL-MCNC: 8 MG/DL (ref 8.5–10.1)
CHLORIDE SERPL-SCNC: 113 MMOL/L (ref 94–109)
CK SERPL-CCNC: 76 U/L (ref 30–300)
CO2 SERPL-SCNC: 28 MMOL/L (ref 20–32)
CREAT SERPL-MCNC: 0.69 MG/DL (ref 0.66–1.25)
DIFFERENTIAL METHOD BLD: ABNORMAL
EOSINOPHIL # BLD AUTO: 0.2 10E9/L (ref 0–0.7)
EOSINOPHIL NFR BLD AUTO: 3.4 %
ERYTHROCYTE [DISTWIDTH] IN BLOOD BY AUTOMATED COUNT: 14.1 % (ref 10–15)
GFR SERPL CREATININE-BSD FRML MDRD: ABNORMAL ML/MIN/1.7M2
GLUCOSE SERPL-MCNC: 86 MG/DL (ref 70–99)
HCT VFR BLD AUTO: 34.3 % (ref 40–53)
HGB BLD-MCNC: 11.2 G/DL (ref 13.3–17.7)
IMM GRANULOCYTES # BLD: 0 10E9/L (ref 0–0.4)
IMM GRANULOCYTES NFR BLD: 0.2 %
LYMPHOCYTES # BLD AUTO: 1.5 10E9/L (ref 0.8–5.3)
LYMPHOCYTES NFR BLD AUTO: 22.7 %
MAGNESIUM SERPL-MCNC: 2 MG/DL (ref 1.6–2.3)
MCH RBC QN AUTO: 30.6 PG (ref 26.5–33)
MCHC RBC AUTO-ENTMCNC: 32.7 G/DL (ref 31.5–36.5)
MCV RBC AUTO: 94 FL (ref 78–100)
MONOCYTES # BLD AUTO: 0.8 10E9/L (ref 0–1.3)
MONOCYTES NFR BLD AUTO: 12.5 %
NEUTROPHILS # BLD AUTO: 3.9 10E9/L (ref 1.6–8.3)
NEUTROPHILS NFR BLD AUTO: 60.7 %
NRBC # BLD AUTO: 0 10*3/UL
NRBC BLD AUTO-RTO: 0 /100
PHOSPHATE SERPL-MCNC: 2.8 MG/DL (ref 2.5–4.5)
PLATELET # BLD AUTO: 167 10E9/L (ref 150–450)
POTASSIUM SERPL-SCNC: 3.4 MMOL/L (ref 3.4–5.3)
PREALB SERPL IA-MCNC: 14 MG/DL (ref 15–45)
PROT SERPL-MCNC: 6.7 G/DL (ref 6.8–8.8)
RBC # BLD AUTO: 3.66 10E12/L (ref 4.4–5.9)
SODIUM SERPL-SCNC: 148 MMOL/L (ref 133–144)
TRIGL SERPL-MCNC: 53 MG/DL
WBC # BLD AUTO: 6.5 10E9/L (ref 4–11)

## 2017-04-14 PROCEDURE — 82248 BILIRUBIN DIRECT: CPT | Performed by: SURGERY

## 2017-04-14 PROCEDURE — 84100 ASSAY OF PHOSPHORUS: CPT | Performed by: SURGERY

## 2017-04-14 PROCEDURE — 84478 ASSAY OF TRIGLYCERIDES: CPT | Performed by: SURGERY

## 2017-04-14 PROCEDURE — 83735 ASSAY OF MAGNESIUM: CPT | Performed by: SURGERY

## 2017-04-14 PROCEDURE — 80053 COMPREHEN METABOLIC PANEL: CPT | Performed by: SURGERY

## 2017-04-14 PROCEDURE — 84134 ASSAY OF PREALBUMIN: CPT | Performed by: SURGERY

## 2017-04-14 PROCEDURE — 82550 ASSAY OF CK (CPK): CPT | Performed by: SURGERY

## 2017-04-14 PROCEDURE — 85025 COMPLETE CBC W/AUTO DIFF WBC: CPT | Performed by: SURGERY

## 2017-04-15 LAB
BACTERIA SPEC CULT: NO GROWTH
BACTERIA SPEC CULT: NO GROWTH
Lab: NORMAL
Lab: NORMAL
MICRO REPORT STATUS: NORMAL
MICRO REPORT STATUS: NORMAL
SPECIMEN SOURCE: NORMAL
SPECIMEN SOURCE: NORMAL

## 2017-04-17 ENCOUNTER — TELEPHONE (OUTPATIENT)
Dept: FAMILY MEDICINE | Facility: CLINIC | Age: 47
End: 2017-04-17

## 2017-04-17 NOTE — TELEPHONE ENCOUNTER
Reason for Call:  Form, our goal is to have forms completed with 72 hours, however, some forms may require a visit or additional information.    Type of letter, form or note:  medical    Who is the form from?: [facility] (if other please explain)    Where did the form come from: form was faxed in    What clinic location was the form placed at?: [facility] - [phone]    Where the form was placed: 's Box    What number is listed as a contact on the form?: [phone]       Additional comments: please review,sign and fax all pages back to [phone]    Call taken on 4/17/2017 at 10:55 AM by Nidia Shine

## 2017-04-18 DIAGNOSIS — F41.9 CHRONIC ANXIETY: ICD-10-CM

## 2017-04-18 RX ORDER — DIAZEPAM 5 MG
5 TABLET ORAL DAILY PRN
Qty: 30 TABLET | Refills: 2 | Status: CANCELLED | OUTPATIENT
Start: 2017-04-18

## 2017-04-18 NOTE — TELEPHONE ENCOUNTER
VALIUM      Last Written Prescription Date:  4/11/17  Last Fill Quantity: 30,   # refills: 2  Last Office Visit with [facility], [facility] or [facility] prescribing provider: 4/11/17  Future Office visit:    Next 5 appointments (look out 90 days)     May 02, 2017 11:00 AM CDT   SHORT with Esteban Daly MD   [facility] [place] ([facility])    [address]  [facility] [address]   [phone]            Jul 05, 2017  2:30 PM CDT   Return Visit with Patti Milligan MD   [facility] [place] ([facility] [place])    [address]  [place] [address] [phone]                   Routing refill request to provider for review/approval because:  Drug not on the [facility], [facility] or [facility] refill protocol or controlled substance

## 2017-04-20 ENCOUNTER — TELEPHONE (OUTPATIENT)
Dept: FAMILY MEDICINE | Facility: CLINIC | Age: 47
End: 2017-04-20

## 2017-04-20 ENCOUNTER — MEDICAL CORRESPONDENCE (OUTPATIENT)
Dept: HEALTH INFORMATION MANAGEMENT | Facility: CLINIC | Age: 47
End: 2017-04-20

## 2017-04-20 NOTE — TELEPHONE ENCOUNTER
Reason for Call:  Form, our goal is to have forms completed with 72 hours, however, some forms may require a visit or additional information.    Type of letter, form or note:  medical    Who is the form from?: [facility] (if other please explain)    Where did the form come from: form was faxed in    What clinic location was the form placed at?: [facility] - [phone]    Where the form was placed: 's Box    What number is listed as a contact on the form?: [phone]       Additional comments: please review orders,sign,date and return all pages to fax [phone]    Call taken on 4/20/2017 at 9:53 AM by Nidia Shine

## 2017-04-27 ENCOUNTER — CARE COORDINATION (OUTPATIENT)
Dept: CARE COORDINATION | Facility: CLINIC | Age: 47
End: 2017-04-27

## 2017-04-27 NOTE — PROGRESS NOTES
"Clinic Care Coordination Contact  OUTREACH    Referral Information:  Referral Source: IP/TCU Report  Reason for Contact: RN CC call to patient and spouse for follow up.  Care Conference: No     Universal Utilization:   ED Visits in last year: 5  Hospital visits in last year: 2  Last PCP appointment: 04/11/17  Missed Appointments: 1  Concerns: yes, high utilization  Multiple Providers or Specialists: yes    Clinical Concerns:  Current Medical Concerns: Patient diagnosed with pneumonia 4/11/17.  Patient reports no improvement, however, he does state that the inhaler helps with his shortness of breath.  Patient currently only using his inhaler 2 times/day.  RN CC educated patient on using his inhaler every 4 hours as needed.  Patient continues to reports productive cough with thick, hard to bring up, yellow sputum.  \"I can feel the liquid rattling in my chest.\"  Patient states his temperature continues to fluctuate between  degrees.  He continues to have [facility] out weekly for his PICC line management.  He was supposed to see dr. Thompson today to discuss plans on his port, however, due to not feeling well patient cancelled his appointment.  Patient has an upcoming appointment with Dr. Thompson 5/18/17.  Patient will see Dr. Daly 5/2/17 for pneumonia follow up.  Current Behavioral Concerns: Has diagnoses of ADHD and anxiety.    Education Provided to patient: RN CC educated patient on using albuterol every 4 hours as needed.   Clinical Pathway Name: None    Medication Management:  Spouse manages patient's medications.       Functional Status:  Mobility Status: Independent  Equipment Currently Used at Home: cane, straight  Transportation: Family provides            Psychosocial:  Current living arrangement:: I live in a private home with spouse  Financial/Insurance: No concerns.  Patient and spouse are awaiting to hear back on a home in [place].     Resources and Interventions:  Current Resources: Home " Care, Home Infusion; Other (see comment) ([facility] and [employer] resolving rental)        Advanced Care Plans/Directives on file:: Yes        Goals:   Goal 1 Statement: I, the spouse, will call infectious disease doctor about needing new antibiotic  Goal 1 Progression Percent: 100%  Goal 1 Progression Date: 04/27/17  Goal 2 Statement: I will use my inhaler every 4 hours as needed for shortness of breath.  Goal 2 Progression Percent: 50%  Goal 2 Progression Date: 04/27/17              Barriers: Spouse and patient have multiple stressors, including trying to find a new home by the end of this month.  Strengths: Spouse has followed through on goals in the past   Patient/Caregiver understanding: Patient and spouse verbalize understanding of using albuterol inhaler every 4 hours for shortness of breath.  Patient will follow up with Dr. Daly as planned on 5/2/17.  Frequency of Care Coordination: monthly  Upcoming appointment: 05/02/17 (PCP)     Plan:   Patient will continue to follow treatment plan as directed and follow up with PCP with concerns ongoing.   Patient will use his albuterol every 4 hours as needed for shortness of breath.  Patient will see his PCP 5/2/17.  RN CC will follow up with patient in 1 month.    Melissa Behl BSN, RN, PHN  [facility] Care Coordinator  [phone]  [email or web address]

## 2017-04-27 NOTE — LETTER
[facility] Home  Complex Care Plan  About Me  Patient Name:  Parker Acevedo Sr    YOB: 1970  Age:   46 year old   Divine MRN: [id number] Telephone Information:     Home Phone [phone]   Mobile [phone]       Address:    [address] [place] [address]  [facility] [address] Email address:  [email or web address]      Emergency Contact(s)  Name Relationship Lgl Grd Work Phone Home Phone Mobile Phone   1. CHARITYNARINDERNAHUM HOLDER* Spouse  none [phone]   2. MARELY ACEVEDO* Mother   [phone]           Primary language:  English     needed? No   [place] [facility]:  [phone] op. 1  Other communication barriers: No  Preferred Method of Communication:  Phone  Current living arrangement: I live in a private home with spouse  Mobility Status/ Medical Equipment: Independent  Other information to know about me:    Health Maintenance  Health Maintenance Reviewed: Up to date    My Access Plan  Medical Emergency 911   Primary Clinic Line [facility]- [phone]   24 Hour Appointment Line [phone] or  [phone]) (toll-free)   24 Hour Nurse Line [phone] (toll-free)   Preferred Urgent Care     Preferred Hospital [facility]  [phone]   Preferred Pharmacy [facility] [address]     Behavioral Health Crisis Line [facility], [phone] or 911     My Care Team Members  Patient Care Team       Relationship Specialty Notifications Start End    Esteban Daly MD PCP - General Family Practice  6/2/15     Comment:  [facility]    Phone: [phone] Fax: [phone]         [facility] [address] [facility] [address]    Francis Vyas MD Referring Physician Surgery  4/9/15     Comment:  GI     Phone: [phone] Fax: [phone]          [facility] [address] [facility] [address] [facility]  [address]    Esteban Daly MD Referring Physician [facility]  4/9/15     Phone: [phone] Fax: [phone]         [facility] [address] [place] [address]    Esteban Daly MD   [facility]  6/2/15     Comment:  Merged    Phone: [phone] Fax: [phone]         [facility] [address] [facility] [address]    Bill Brumfield MD MD Gastroenterology  6/2/15     Phone: [phone] Fax: [phone]         [facility] [address] [facility] [address] [facility] [address]    Patti Milligan MD MD Pain Clinic  6/2/15     Phone: [phone] Fax: [phone]         [facility] [address] [facility] [address] [facility] [address]    Chen Elkins, APRN CNP Nurse Practitioner Nurse Practitioner  11/24/15     Phone: [phone] Pager: [phone] Fax: [phone]        [facility] [address] [facility] [address] [facility] [address]    Behl, Melissa K, RN Registered Nurse  Admissions 4/4/16     Comment:  Phone [phone]    Gabbi Thompson MD MD Infectious Diseases  10/31/16     Phone: [phone] Fax: [phone]         [facility] [address] [facility] [address] [facility] [address]         My Care Plans  Self Management and Treatment Plan  Goals and (Comments)  Goal #1: I, the spouse, will call infectious disease doctor about needing new antibiotic      100% of goal reached      Action Plans on File: None  Advance Care Plans/Directives Type:   Type Advanced Care Plans/Directives: Advanced Directive - On File    My Medical and Care Information  Problem List   Patient Active Problem List   Diagnosis     Peptic ulcer disease     Gastric bypass status for obesity     CARDIOVASCULAR SCREENING; LDL GOAL LESS THAN 160     Chronic abdominal pain     Ulcer (H)     Vomiting     Anemia     ADHD (attention deficit hyperactivity disorder), inattentive type     Vitamin B12 deficiency without anemia     Thiamine deficiency     Dehydration      Dysphagia     Weight loss, non-intentional     Malnutrition (H)     Chronic anxiety     Constipation     Bile reflux esophagitis     Former smoker     Vitamin D deficiency     Iron deficiency     Health Care Home     Chronic pain     Coagulase negative staph bacteremia associated with intravascular line (H)     Insomnia     Positive blood culture     Fungemia     Chronic nausea     Gastrostomy tube in place (H)     Cervical radiculopathy     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)     Severe malnutrition (H)     Short gut syndrome     Anxiety     ADHD (attention deficit hyperactivity disorder)     Status post cervical spinal arthrodesis     Port or reservoir infection, initial encounter     Abnormal echocardiogram      Current Medications and Allergies:  See printed Medication Report.    Care Coordination Start Date: 04/25/16   Frequency of Care Coordination: monthly   Form Last Updated: 04/27/2017

## 2017-04-29 ENCOUNTER — MYC REFILL (OUTPATIENT)
Dept: FAMILY MEDICINE | Facility: CLINIC | Age: 47
End: 2017-04-29

## 2017-04-29 DIAGNOSIS — E55.9 VITAMIN D DEFICIENCY: ICD-10-CM

## 2017-04-29 DIAGNOSIS — F41.9 CHRONIC ANXIETY: ICD-10-CM

## 2017-04-29 DIAGNOSIS — Z98.84 BARIATRIC SURGERY STATUS: ICD-10-CM

## 2017-04-29 DIAGNOSIS — R11.0 NAUSEA: ICD-10-CM

## 2017-04-29 DIAGNOSIS — F90.0 ADHD (ATTENTION DEFICIT HYPERACTIVITY DISORDER), INATTENTIVE TYPE: ICD-10-CM

## 2017-05-01 RX ORDER — SUCRALFATE ORAL 1 G/10ML
1 SUSPENSION ORAL 4 TIMES DAILY
Qty: 1200 ML | Refills: 11 | Status: SHIPPED | OUTPATIENT
Start: 2017-05-01 | End: 2017-06-20

## 2017-05-01 RX ORDER — ERGOCALCIFEROL 1.25 MG/1
50000 CAPSULE, LIQUID FILLED ORAL
Qty: 12 CAPSULE | Refills: 3 | Status: SHIPPED | OUTPATIENT
Start: 2017-05-01 | End: 2017-10-03

## 2017-05-01 RX ORDER — ONDANSETRON 8 MG/1
8 TABLET, ORALLY DISINTEGRATING ORAL EVERY 8 HOURS PRN
Qty: 90 TABLET | Refills: 3 | Status: SHIPPED | OUTPATIENT
Start: 2017-05-01 | End: 2017-08-01

## 2017-05-01 RX ORDER — CYANOCOBALAMIN 1000 UG/ML
1 INJECTION, SOLUTION INTRAMUSCULAR; SUBCUTANEOUS
Qty: 1 ML | Refills: 11 | Status: SHIPPED | OUTPATIENT
Start: 2017-05-01 | End: 2017-06-20

## 2017-05-01 RX ORDER — DEXTROAMPHETAMINE SACCHARATE, AMPHETAMINE ASPARTATE, DEXTROAMPHETAMINE SULFATE AND AMPHETAMINE SULFATE 5; 5; 5; 5 MG/1; MG/1; MG/1; MG/1
20 TABLET ORAL 2 TIMES DAILY
Qty: 60 TABLET | Refills: 0 | OUTPATIENT
Start: 2017-05-01

## 2017-05-01 RX ORDER — DIAZEPAM 5 MG
5 TABLET ORAL DAILY PRN
Qty: 30 TABLET | Refills: 2 | OUTPATIENT
Start: 2017-05-01

## 2017-05-01 NOTE — TELEPHONE ENCOUNTER
Message from MyChart:  Original authorizing provider: MD Parker Camara Sr would like a refill of the following medications:  ondansetron (ZOFRAN-ODT) 8 MG ODT tab [Esteban Daly MD]  vitamin D (ERGOCALCIFEROL) [address]37 UNIT capsule [Esteban Daly MD]  sucralfate (CARAFATE) 1 GM/10ML suspension [Esteban Daly MD]  amphetamine-dextroamphetamine (ADDERALL) 20 MG per tablet [Esteban Daly MD]  diazepam (VALIUM) 5 MG tablet [Esteban Daly MD]  cyanocobalamin (VITAMIN B12) 1000 MCG/ML injection [Esteban Daly MD]    Preferred pharmacy: [facility] [address]    Comment:  We will be picking them all up on Friday April 5 2017 around 1pm so if you can have them all ready to go Thank You Alina if you have any questions feel free to call us at [phone] thanks again

## 2017-05-09 ENCOUNTER — OFFICE VISIT (OUTPATIENT)
Dept: FAMILY MEDICINE | Facility: CLINIC | Age: 47
End: 2017-05-09
Payer: COMMERCIAL

## 2017-05-09 ENCOUNTER — RADIANT APPOINTMENT (OUTPATIENT)
Dept: GENERAL RADIOLOGY | Facility: CLINIC | Age: 47
End: 2017-05-09
Attending: FAMILY MEDICINE
Payer: COMMERCIAL

## 2017-05-09 VITALS
SYSTOLIC BLOOD PRESSURE: 130 MMHG | DIASTOLIC BLOOD PRESSURE: 82 MMHG | OXYGEN SATURATION: 99 % | WEIGHT: 174.3 LBS | RESPIRATION RATE: 16 BRPM | HEIGHT: 71 IN | BODY MASS INDEX: 24.4 KG/M2 | TEMPERATURE: 97.7 F | HEART RATE: 84 BPM

## 2017-05-09 DIAGNOSIS — G89.29 CHRONIC ABDOMINAL PAIN: ICD-10-CM

## 2017-05-09 DIAGNOSIS — J18.9 PNEUMONIA OF RIGHT MIDDLE LOBE DUE TO INFECTIOUS ORGANISM: Primary | ICD-10-CM

## 2017-05-09 DIAGNOSIS — G47.00 INSOMNIA, UNSPECIFIED TYPE: ICD-10-CM

## 2017-05-09 DIAGNOSIS — R10.9 CHRONIC ABDOMINAL PAIN: ICD-10-CM

## 2017-05-09 DIAGNOSIS — J18.9 PNEUMONIA OF RIGHT MIDDLE LOBE DUE TO INFECTIOUS ORGANISM: ICD-10-CM

## 2017-05-09 DIAGNOSIS — F90.0 ADHD (ATTENTION DEFICIT HYPERACTIVITY DISORDER), INATTENTIVE TYPE: ICD-10-CM

## 2017-05-09 DIAGNOSIS — F41.9 ANXIETY: ICD-10-CM

## 2017-05-09 PROCEDURE — 99214 OFFICE O/P EST MOD 30 MIN: CPT | Performed by: FAMILY MEDICINE

## 2017-05-09 PROCEDURE — 71020 XR CHEST 2 VW: CPT

## 2017-05-09 RX ORDER — DEXTROAMPHETAMINE SACCHARATE, AMPHETAMINE ASPARTATE, DEXTROAMPHETAMINE SULFATE AND AMPHETAMINE SULFATE 5; 5; 5; 5 MG/1; MG/1; MG/1; MG/1
20 TABLET ORAL 2 TIMES DAILY
Qty: 60 TABLET | Refills: 0 | Status: SHIPPED | OUTPATIENT
Start: 2017-07-03 | End: 2017-08-01

## 2017-05-09 ASSESSMENT — ANXIETY QUESTIONNAIRES
GAD7 TOTAL SCORE: 3
3. WORRYING TOO MUCH ABOUT DIFFERENT THINGS: NOT AT ALL
7. FEELING AFRAID AS IF SOMETHING AWFUL MIGHT HAPPEN: NOT AT ALL
1. FEELING NERVOUS, ANXIOUS, OR ON EDGE: SEVERAL DAYS
6. BECOMING EASILY ANNOYED OR IRRITABLE: NOT AT ALL
IF YOU CHECKED OFF ANY PROBLEMS ON THIS QUESTIONNAIRE, HOW DIFFICULT HAVE THESE PROBLEMS MADE IT FOR YOU TO DO YOUR WORK, TAKE CARE OF THINGS AT HOME, OR GET ALONG WITH OTHER PEOPLE: SOMEWHAT DIFFICULT
2. NOT BEING ABLE TO STOP OR CONTROL WORRYING: NOT AT ALL
5. BEING SO RESTLESS THAT IT IS HARD TO SIT STILL: SEVERAL DAYS

## 2017-05-09 ASSESSMENT — PATIENT HEALTH QUESTIONNAIRE - PHQ9: 5. POOR APPETITE OR OVEREATING: SEVERAL DAYS

## 2017-05-09 ASSESSMENT — PAIN SCALES - GENERAL: PAINLEVEL: NO PAIN (0)

## 2017-05-09 NOTE — NURSING NOTE
"Chief Complaint   Patient presents with     Recheck Pneumonia       Initial /82 (BP Location: Left arm, Patient Position: Chair, Cuff Size: Adult Regular)  Pulse 84  Temp 97.7  F (36.5  C) (Temporal)  Resp 16  Ht 5' 11\" (1.803 m)  Wt 174 lb 4.8 oz (79.1 kg)  SpO2 99%  BMI 24.31 kg/m2 Estimated body mass index is 24.31 kg/(m^2) as calculated from the following:    Height as of this encounter: 5' 11\" (1.803 m).    Weight as of this encounter: 174 lb 4.8 oz (79.1 kg).  Medication Reconciliation: complete  Louann Bojorquez CMA (AAMA)    "

## 2017-05-09 NOTE — PROGRESS NOTES
SUBJECTIVE:                                                    Parker Acevedo Sr is a 46 year old male who presents to clinic today for the following health issues:    Acute Illness--    Patient is here for follow-up after aspiration pneumonia involving right middle lobe. This is treated with Levaquin for 1 day 1 month ago. He is currently asymptomatic without fever, cough or shortness of breath.      Acute illness concerns: Recheck pneumonia  Onset: recheck    Fever: YES    Chills/Sweats: YES    Headache (location?): YES    Sinus Pressure:YES    Conjunctivitis:  no    Ear Pain: no    Rhinorrhea: YES    Congestion: YES    Sore Throat: no     Cough: YES-productive of green sputum    Wheeze: YES    Decreased Appetite: YES    Nausea: YES    Vomiting: YES    Diarrhea:  YES    Dysuria/Freq.: no    Fatigue/Achiness: YES    Sick/Strep Exposure: no     Therapies Tried and outcome: Levaquin           Problem list and histories reviewed & adjusted, as indicated.  Additional history: as documented        Reviewed and updated as needed this visit by clinical staff  Tobacco  Allergies  Med Hx  Surg Hx  Fam Hx  Soc Hx      Reviewed and updated as needed this visit by Provider         ROS:  C: NEGATIVE for fever, chills, change in weight  CONSTITUTIONAL: Is on TPN through a PICC line. Appears to be stable without new onset of fever or chills.  I: NEGATIVE for worrisome rashes, moles or lesions  E: NEGATIVE for vision changes or irritation  E/M: NEGATIVE for ear, mouth and throat problems  R: NEGATIVE for significant cough or SOB  RESP: Has restarted smoking. No major change in respiratory status.  CV: NEGATIVE for chest pain, palpitations or peripheral edema  GI: Chronic abdominal pain from postoperative complications related to bariatric surgery. He is followed in the [facility]. He is on enteral feedings due to lack of a functioning stomach.Requiring chronic pain medications.  : NEGATIVE for  "frequency, dysuria, or hematuria  M: NEGATIVE for significant arthralgias or myalgia  N: NEGATIVE for weakness, dizziness or paresthesias  E: NEGATIVE for temperature intolerance, skin/hair changes  H: NEGATIVE for bleeding problems  PSYCHIATRIC: Reports ongoing chronic anxiety. Is adhering to Valium 5 mg at bedtime, having discontinued the second dose during the day. Some anxiety increased. Currently on Adderall 20 mg twice daily for attention deficit disorder. Appears to be benefiting for focusing and completing tasks. Patient does not feel advanced to his anxiety.    OBJECTIVE:                                                    /82 (BP Location: Left arm, Patient Position: Chair, Cuff Size: Adult Regular)  Pulse 84  Temp 97.7  F (36.5  C) (Temporal)  Resp 16  Ht 5' 11\" (1.803 m)  Wt 174 lb 4.8 oz (79.1 kg)  SpO2 99%  BMI 24.31 kg/m2  Body mass index is 24.31 kg/(m^2).  GENERAL: alert, no distress, cooperative and thin appearing  EYES: Eyes grossly normal to inspection, extraocular movements - intact, and PERRL  HENT: ear canals- normal; TMs- normal; Nose- normal; Mouth- no ulcers, no lesions  NECK: no tenderness, no adenopathy, no asymmetry, no masses, no stiffness; thyroid- normal to palpation  RESP: lungs clear to auscultation - no rales, no rhonchi, no wheezes  CV: regular rates and rhythm, normal S1 S2, no S3 or S4 and no murmur, no click or rub -  ABDOMEN: Multiple well-healed surgical scars noted. No distention. G-tube vent placed in the left upper quadrant.  MS: extremities- no gross deformities noted, no edema  SKIN: no suspicious lesions, no rashes  NEURO: strength and tone- normal, sensory exam- grossly normal, mentation- intact, speech- normal, reflexes- symmetric  BACK: no CVA tenderness, no paralumbar tenderness  PSYCH: Alert and oriented times 3; speech- coherent , normal rate and volume; able to articulate logical thoughts, able to abstract reason, no tangential thoughts, no " hallucinations or delusions, affect- normal  LYMPHATICS: ant. cervical- normal, post. cervical- normal, axillary- normal, supraclavicular- normal, inguinal- normal    Diagnostic test results:  Diagnostic Test Results:    Xr Chest 2 Views    Result Date: 5/9/2017  XR CHEST 2 VW  5/9/2017 11:06 AM HISTORY:  Pneumonia, unspecified organism COMPARISON:  4/11/2017     IMPRESSION:  Right IJ line tip in the distal SVC. Right subclavian PICC line tip obscured by the IJ line but appears to be in the mid/distal SVC, also in satisfactory location. The right middle lobe infiltrate has nearly completely resolved. JONNY MENDIOLA MD    Xr Chest 2 Views    Result Date: 4/11/2017  XR CHEST 2 VW  4/11/2017 11:16 AM HISTORY:  Pneumonitis due to inhalation of food and vomit COMPARISON:  3/12/2017     IMPRESSION:  Right central line tip at the cavoatrial junction region. New patchy opacities at the right lung base, concerning for right middle lobe pneumonia. Otherwise negative. JONNY MENIDOLA MD       ASSESSMENT/PLAN:                                                    1. Pneumonia of right middle lobe due to infectious organism      Appears to be essentially resolved on x-ray and clinically. Follow-up as needed. Does not appear to need a follow-up chest x-ray.  - XR Chest 2 Views; Future    2. ADHD (attention deficit hyperactivity disorder), inattentive type    Medications to continue at the current dose. One additional prescription given for July. He'll be seen in 3 months for ADHD follow-up.  - amphetamine-dextroamphetamine (ADDERALL) 20 MG per tablet; Take 1 tablet (20 mg) by mouth 2 times daily  Dispense: 60 tablet; Refill: 0    3. Anxiety    He will continue on only one Valium 5 mg daily. This will be likely at night.    4. Chronic abdominal pain   Pain management as directed by [facility].    5. Insomnia, unspecified type   Utilizing Valium at night for sleep.      Follow up with Provider - Follow-up in 3 months for yearly  follow-up.   See Patient Instructions    The patient understood the rational for the diagnosis and treatment plan. All questions were answered to best of my ability and the patient's satisfaction.      Esteban Daly MD  [facility]

## 2017-05-09 NOTE — MR AVS SNAPSHOT
After Visit Summary   5/9/2017    Parker Acevedo     MRN: [id number]           Patient Information     Date Of Birth          1970        Visit Information        Provider Department      5/9/2017 11:00 AM Esteban Daly MD [facility]        Today's Diagnoses     Pneumonia of right middle lobe due to infectious organism    -  1    ADHD (attention deficit hyperactivity disorder), inattentive type        Anxiety        Chronic abdominal pain        Insomnia, unspecified type          Care Instructions    These are new changes to your current plan of care based on today's visit:    Medications stopped    Medications to be started    Change dose of this medication none   New treatments        Follow up appointments:    1.  FOLLOW UP WITH YOUR PRIMARY CARE PROVIDER: 3 month(s) for three month clinic visit    2. FOLLOW UP WITH SPECIALIST: As planned    Esteban Daly MD              Follow-ups after your visit        Your next 10 appointments already scheduled     May 18, 2017  9:00 AM CDT   (Arrive by 8:45 AM)   Return Visit with Gabbi Thompson MD   [facility] and Infectious Diseases ([facility])    [address] [place] [address] [phone]            Jul 05, 2017  2:30 PM CDT   Return Visit with Patti Milligan MD   [facility] ([facility])    [address] [facility] [address] [phone]            Jul 18, 2017 11:00 AM CDT   Office Visit with Esteban Daly MD   [facility] ([facility])    [address] [facility], [address]  [facility] [id number] [phone]           Bring a current list of meds and any records pertaining to this visit.  For Physicals, please bring immunization records and any forms needing to be filled out.  Please arrive 10 minutes early to complete paperwork.              Who  "to contact     If you have questions or need follow up information about today's clinic visit or your schedule please contact [facility] directly at [phone].  Normal or non-critical lab and imaging results will be communicated to you by MyChart, letter or phone within 4 business days after the clinic has received the results. If you do not hear from us within 7 days, please contact the clinic through [employer] or phone. If you have a critical or abnormal lab result, we will notify you by phone as soon as possible.  Submit refill requests through "[employer]" or call your pharmacy and they will forward the refill request to us. Please allow 3 business days for your refill to be completed.          Additional Information About Your Visit        "[employer]" Information     "[employer]" gives you secure access to your electronic health record. If you see a primary care provider, you can also send messages to your care team and make appointments. If you have questions, please call your primary care clinic.  If you do not have a primary care provider, please call [phone] and they will assist you.        Care EveryWhere ID     This is your Care EveryWhere ID. This could be used by other organizations to access your [place] medical records  [id number]        Your Vitals Were     Pulse Temperature Respirations Height Pulse Oximetry BMI (Body Mass Index)    84 97.7  F (36.5  C) (Temporal) 16 5' 11\" (1.803 m) 99% 24.31 kg/m2       Blood Pressure from Last 3 Encounters:   05/09/17 130/82   04/11/17 118/62   04/05/17 130/89    Weight from Last 3 Encounters:   05/09/17 174 lb 4.8 oz (79.1 kg)   04/11/17 174 lb (78.9 kg)   04/05/17 195 lb (88.5 kg)                 Where to get your medicines      Some of these will need a paper prescription and others can be bought over the counter.  Ask your nurse if you have questions.     Bring a paper prescription for each of these medications     amphetamine-dextroamphetamine 20 MG " per tablet          Primary Care Provider Office Phone # Fax #    Esteban Daly [phone]       [facility] [place] [address] [facility]  [place] [address]        Thank you!     [facility]  [facility]. Our goal is always to provide you with excellent care. Hearing back from our patients is one way we can continue to improve our services. Please take a few minutes to complete the written survey that you may receive in the mail after your visit with us. Thank you!             Your Updated Medication List - Protect others around you: Learn how to safely use, store and throw away your medicines at www.disposemymeds.org.          This list is accurate as of: 5/9/17 11:29 AM.  Always use your most recent med list.                   Brand Name Dispense Instructions for use    albuterol 108 (90 BASE) MCG/ACT Inhaler    PROAIR HFA/PROVENTIL HFA/VENTOLIN HFA    1 Inhaler    Inhale 2 puffs into the lungs every 4 hours as needed for shortness of breath / dyspnea or wheezing       * amphetamine-dextroamphetamine 20 MG per tablet    ADDERALL    60 tablet    Take 1 tablet (20 mg) by mouth 2 times daily       * amphetamine-dextroamphetamine 20 MG per tablet    ADDERALL    60 tablet    Take 1 tablet (20 mg) by mouth 2 times daily       * amphetamine-dextroamphetamine 20 MG per tablet   Start taking on:  6/5/2017    ADDERALL    60 tablet    Take 1 tablet (20 mg) by mouth 2 times daily       * amphetamine-dextroamphetamine 20 MG per tablet   Start taking on:  7/3/2017    ADDERALL    60 tablet    Take 1 tablet (20 mg) by mouth 2 times daily       cyanocobalamin 1000 MCG/ML injection    VITAMIN B12    1 mL    Inject 1 mL (1,000 mcg) into the muscle every 30 days       diazepam 5 MG tablet    VALIUM    30 tablet    Take 1 tablet (5 mg) by mouth daily as needed for anxiety or sleep       * [facility] INFUSION MANAGED PATIENT      Contact [facility] for  "patient specific medication information at [phone] on admission and discharge from the hospital.  Phones are answered 24 hours a day 7 days a week 365 days a year.  Providers - Choose \"CONTINUE HOME MED (no script)\" at discharge if patient treatment with home infusion will continue.       * [facility] INFUSION MANAGED PATIENT      Contact [facility] for patient specific medication information at [phone] on admission and discharge from the hospital.  Phones are answered 24 hours a day 7 days a week 365 days a year.  Providers - Choose \"CONTINUE HOME MED (no script)\" at discharge if patient treatment with home infusion will continue.       fentaNYL 50 mcg/hr 72 hr patch    DURAGESIC    15 patch    Place 1 patch onto the skin every 48 hours MYLAN BRAND ONLY. Fill on/after 4/24/17 to start on/after 4/26/17       lidocaine-prilocaine cream    EMLA    30 g    Apply topically as needed for moderate pain       morphine 0.1% in intrasite topical gel     100 g    Apply as needed prior to accessing the port site.       mupirocin 2 % ointment    BACTROBAN    30 g    Apply topically 3 times daily       Needle (Disp) 27G X 1/2\" Misc    BD DISP NEEDLES    3 each    1 Device every 30 days Use for cyanocobalamin injection once q 30 days.       nystatin-triamcinolone cream    MYCOLOG II    60 g    Apply topically 2 times daily as needed       ondansetron 8 MG ODT tab    ZOFRAN-ODT    90 tablet    Take 1 tablet (8 mg) by mouth every 8 hours as needed for nausea       * order for DME     12 each    Injection Supplies for Vitamin B12: 3cc syringes w/ 27 gauge needles, 1/2 inch length       * order for DME     4 each    Equipment being ordered: Bilateral knee high chronic venous insufficiency stockings--  mild-moderate pressures.       oxyCODONE 5 MG/5ML solution    ROXICODONE    1350 mL    Take 10-15 mLs (10-15 mg) by mouth every 4 hours as needed for moderate to severe pain Max of 45mg per day. Fill " on/after 4/24/17 not to start till 4/25/17.       senna-docusate 8.6-50 MG per tablet    SENOKOT-S;PERICOLACE    120 tablet    Take 1-2 tablets by mouth 2 times daily as needed for constipation       sucralfate 1 GM/10ML suspension    CARAFATE    1200 mL    Take 10 mLs (1 g) by mouth 4 times daily       vitamin D 67306 UNIT capsule    ERGOCALCIFEROL    12 capsule    Take 1 capsule (50,000 Units) by mouth every 7 days       * Notice:  This list has 8 medication(s) that are the same as other medications prescribed for you. Read the directions carefully, and ask your doctor or other care provider to review them with you.

## 2017-05-09 NOTE — PATIENT INSTRUCTIONS
These are new changes to your current plan of care based on today's visit:    Medications stopped    Medications to be started    Change dose of this medication none   New treatments        Follow up appointments:    1.  FOLLOW UP WITH YOUR PRIMARY CARE PROVIDER: 3 month(s) for three month clinic visit    2. FOLLOW UP WITH SPECIALIST: As planned    Esteban Daly MD

## 2017-05-10 ASSESSMENT — PATIENT HEALTH QUESTIONNAIRE - PHQ9: SUM OF ALL RESPONSES TO PHQ QUESTIONS 1-9: 4

## 2017-05-10 ASSESSMENT — ANXIETY QUESTIONNAIRES: GAD7 TOTAL SCORE: 3

## 2017-05-12 ENCOUNTER — MYC MEDICAL ADVICE (OUTPATIENT)
Dept: PALLIATIVE MEDICINE | Facility: CLINIC | Age: 47
End: 2017-05-12

## 2017-05-12 DIAGNOSIS — Z79.891 ENCOUNTER FOR LONG-TERM OPIATE ANALGESIC USE: ICD-10-CM

## 2017-05-12 DIAGNOSIS — G89.29 CHRONIC ABDOMINAL PAIN: ICD-10-CM

## 2017-05-12 DIAGNOSIS — R10.9 CHRONIC ABDOMINAL PAIN: ICD-10-CM

## 2017-05-12 NOTE — TELEPHONE ENCOUNTER
My chart message from pt:    We need to have both filled and sent over to [place] pharmacy so I can pick them both up on May 24th 2017 if you have any question feel free to call Thanks again Rose     Sending to BERTA jean baptiste to process refill.     Karo Nuñez RN, [facility]  Pain Clinic Care Coordinator

## 2017-05-12 NOTE — TELEPHONE ENCOUNTER
Medication refill information reviewed.     Due date for fentanyl 50 mcg is 5/26 and oxycodone 5 mg/ml is 5/26     Prescriptions prepped for review.     Will route to provider.  Patient requesting the Rxs be mailed to [facility], not [facility] as listed in the message below.     YADIRA CrookN, RN-BC  Patient Care Supervisor/Care Coordinator  [facility]

## 2017-05-12 NOTE — TELEPHONE ENCOUNTER
Received call from patient requesting refill(s) of fentaNYL (DURAGESIC) 50 mcg/hr 72 hr patch AND oxyCODONE (ROXICODONE) 5 MG/5ML solution    Last picked up from pharmacy on 4/24/17    Pt last seen by prescribing provider on 4/5/17  Next appt scheduled for 7/5/17    Last urine drug screen date 4/5/17  Current opioid agreement on file (completed within the last year) Yes Date of opioid agreement: 11/14/16    Processing (pick one and delete the others):  Mail to [facility] [address] Corky Chatman, [place]  [facility]    Will route to nursing pool for review and preparation of prescription(s).

## 2017-05-15 RX ORDER — FENTANYL 50 UG/1
1 PATCH TRANSDERMAL
Qty: 15 PATCH | Refills: 0 | Status: SHIPPED | OUTPATIENT
Start: 2017-05-15 | End: 2017-06-07

## 2017-05-15 RX ORDER — OXYCODONE HCL 5 MG/5 ML
10-15 SOLUTION, ORAL ORAL EVERY 4 HOURS PRN
Qty: 1350 ML | Refills: 0 | Status: SHIPPED | OUTPATIENT
Start: 2017-05-15 | End: 2017-06-07

## 2017-05-15 NOTE — TELEPHONE ENCOUNTER
ARIEL. Script for oxycodone and fentanyl was signed and mailed out [facility] pharmacy-    [address] [place] [id number]    Lyndsay Solo MA

## 2017-05-15 NOTE — TELEPHONE ENCOUNTER
Signed Prescriptions:                        Disp   Refills    fentaNYL (DURAGESIC) 50 mcg/hr 72 hr patch 15 pat*0        Sig: Place 1 patch onto the skin every 48 hours MYLAN           BRAND ONLY. Fill on/after 5/24/17 to start           on/after 5/26/17  Authorizing Provider: BRANDYN JENKINS    oxyCODONE (ROXICODONE) 5 MG/5ML solution   1350 mL0        Sig: Take 10-15 mLs (10-15 mg) by mouth every 4 hours as           needed for moderate to severe pain Max of 45mg           per day. Fill on/after 5/24/17 not to start till           5/25/17.  Authorizing Provider: BRANDYN JENKINS MD  [facility]

## 2017-05-16 ENCOUNTER — HOSPITAL ENCOUNTER (OUTPATIENT)
Facility: CLINIC | Age: 47
Setting detail: SPECIMEN
Discharge: HOME | End: 2017-05-16
Admitting: SURGERY
Payer: COMMERCIAL

## 2017-05-16 LAB
ALBUMIN SERPL-MCNC: 3.1 G/DL (ref 3.4–5)
ALP SERPL-CCNC: 80 U/L (ref 40–150)
ALT SERPL W P-5'-P-CCNC: 19 U/L (ref 0–70)
ANION GAP SERPL CALCULATED.3IONS-SCNC: 6 MMOL/L (ref 3–14)
AST SERPL W P-5'-P-CCNC: 12 U/L (ref 0–45)
BASOPHILS # BLD AUTO: 0 10E9/L (ref 0–0.2)
BASOPHILS NFR BLD AUTO: 0.5 %
BILIRUB DIRECT SERPL-MCNC: <0.1 MG/DL (ref 0–0.2)
BILIRUB SERPL-MCNC: 0.3 MG/DL (ref 0.2–1.3)
BUN SERPL-MCNC: 13 MG/DL (ref 7–30)
CALCIUM SERPL-MCNC: 8.1 MG/DL (ref 8.5–10.1)
CHLORIDE SERPL-SCNC: 110 MMOL/L (ref 94–109)
CK SERPL-CCNC: 96 U/L (ref 30–300)
CO2 SERPL-SCNC: 30 MMOL/L (ref 20–32)
CREAT SERPL-MCNC: 0.64 MG/DL (ref 0.66–1.25)
DIFFERENTIAL METHOD BLD: ABNORMAL
EOSINOPHIL # BLD AUTO: 0.3 10E9/L (ref 0–0.7)
EOSINOPHIL NFR BLD AUTO: 4 %
ERYTHROCYTE [DISTWIDTH] IN BLOOD BY AUTOMATED COUNT: 14.4 % (ref 10–15)
GFR SERPL CREATININE-BSD FRML MDRD: ABNORMAL ML/MIN/1.7M2
GLUCOSE SERPL-MCNC: 92 MG/DL (ref 70–99)
HCT VFR BLD AUTO: 39.9 % (ref 40–53)
HGB BLD-MCNC: 12.6 G/DL (ref 13.3–17.7)
IMM GRANULOCYTES # BLD: 0 10E9/L (ref 0–0.4)
IMM GRANULOCYTES NFR BLD: 0.2 %
LYMPHOCYTES # BLD AUTO: 1.8 10E9/L (ref 0.8–5.3)
LYMPHOCYTES NFR BLD AUTO: 28.2 %
MAGNESIUM SERPL-MCNC: 2 MG/DL (ref 1.6–2.3)
MCH RBC QN AUTO: 29.9 PG (ref 26.5–33)
MCHC RBC AUTO-ENTMCNC: 31.6 G/DL (ref 31.5–36.5)
MCV RBC AUTO: 95 FL (ref 78–100)
MONOCYTES # BLD AUTO: 0.5 10E9/L (ref 0–1.3)
MONOCYTES NFR BLD AUTO: 7.3 %
NEUTROPHILS # BLD AUTO: 3.8 10E9/L (ref 1.6–8.3)
NEUTROPHILS NFR BLD AUTO: 59.8 %
NRBC # BLD AUTO: 0 10*3/UL
NRBC BLD AUTO-RTO: 0 /100
PHOSPHATE SERPL-MCNC: 2.4 MG/DL (ref 2.5–4.5)
PLATELET # BLD AUTO: 170 10E9/L (ref 150–450)
POTASSIUM SERPL-SCNC: 3.8 MMOL/L (ref 3.4–5.3)
PREALB SERPL IA-MCNC: 24 MG/DL (ref 15–45)
PROT SERPL-MCNC: 6.5 G/DL (ref 6.8–8.8)
RBC # BLD AUTO: 4.21 10E12/L (ref 4.4–5.9)
SODIUM SERPL-SCNC: 146 MMOL/L (ref 133–144)
TRIGL SERPL-MCNC: 71 MG/DL
WBC # BLD AUTO: 6.3 10E9/L (ref 4–11)

## 2017-05-16 PROCEDURE — 85025 COMPLETE CBC W/AUTO DIFF WBC: CPT | Performed by: SURGERY

## 2017-05-16 PROCEDURE — 80053 COMPREHEN METABOLIC PANEL: CPT | Performed by: SURGERY

## 2017-05-16 PROCEDURE — 82248 BILIRUBIN DIRECT: CPT | Performed by: SURGERY

## 2017-05-16 PROCEDURE — 84478 ASSAY OF TRIGLYCERIDES: CPT | Performed by: SURGERY

## 2017-05-16 PROCEDURE — 82550 ASSAY OF CK (CPK): CPT | Performed by: SURGERY

## 2017-05-16 PROCEDURE — 83735 ASSAY OF MAGNESIUM: CPT | Performed by: SURGERY

## 2017-05-16 PROCEDURE — 84100 ASSAY OF PHOSPHORUS: CPT | Performed by: SURGERY

## 2017-05-16 PROCEDURE — 84134 ASSAY OF PREALBUMIN: CPT | Performed by: SURGERY

## 2017-05-18 ENCOUNTER — TELEPHONE (OUTPATIENT)
Dept: INFECTIOUS DISEASES | Facility: CLINIC | Age: 47
End: 2017-05-18

## 2017-05-18 NOTE — TELEPHONE ENCOUNTER
----- Message from Rosario Gómez sent at 5/18/2017  7:47 AM CDT -----  Regarding: Port Questions- appt   Contact: [phone]  PT would like to know if he can hook back up to his port because he does not want to travel to his appt today.    PT can be reached at [phone]    Thank you!  Rosario Unger

## 2017-05-18 NOTE — TELEPHONE ENCOUNTER
Per Dr. Thompson in clinic, if pt has no redness around port site and has had no fevers in the last 4 weeks, pt can hook back up to port.  Astrid Saucedo RN

## 2017-06-01 ENCOUNTER — CARE COORDINATION (OUTPATIENT)
Dept: SURGERY | Facility: CLINIC | Age: 47
End: 2017-06-01

## 2017-06-01 ENCOUNTER — OFFICE VISIT (OUTPATIENT)
Dept: SURGERY | Facility: CLINIC | Age: 47
End: 2017-06-01

## 2017-06-01 ENCOUNTER — HOME INFUSION (PRE-WILLOW HOME INFUSION) (OUTPATIENT)
Dept: PHARMACY | Facility: CLINIC | Age: 47
End: 2017-06-01

## 2017-06-01 VITALS
OXYGEN SATURATION: 98 % | TEMPERATURE: 98.3 F | SYSTOLIC BLOOD PRESSURE: 136 MMHG | WEIGHT: 196.7 LBS | HEART RATE: 58 BPM | DIASTOLIC BLOOD PRESSURE: 80 MMHG | HEIGHT: 71 IN | BODY MASS INDEX: 27.54 KG/M2

## 2017-06-01 DIAGNOSIS — E46 MALNUTRITION (H): Primary | ICD-10-CM

## 2017-06-01 ASSESSMENT — ENCOUNTER SYMPTOMS
SNORES LOUDLY: 1
HEADACHES: 1
SEIZURES: 0
TACHYCARDIA: 1
SPUTUM PRODUCTION: 1
POLYPHAGIA: 0
HEMOPTYSIS: 1
WEIGHT LOSS: 1
FLANK PAIN: 0
RECTAL PAIN: 0
ARTHRALGIAS: 1
BACK PAIN: 1
INSOMNIA: 1
WEAKNESS: 1
POOR WOUND HEALING: 1
POSTURAL DYSPNEA: 1
JAUNDICE: 0
FATIGUE: 1
LEG SWELLING: 1
ABDOMINAL PAIN: 1
SHORTNESS OF BREATH: 1
LEG PAIN: 1
DEPRESSION: 1
WHEEZING: 1
CONSTIPATION: 1
MEMORY LOSS: 1
SLEEP DISTURBANCES DUE TO BREATHING: 1
NUMBNESS: 1
EYE PAIN: 1
NAUSEA: 1
SYNCOPE: 0
RECTAL BLEEDING: 1
INCREASED ENERGY: 1
ALTERED TEMPERATURE REGULATION: 1
DIZZINESS: 1
NIGHT SWEATS: 1
SKIN CHANGES: 1
HALLUCINATIONS: 0
EYE WATERING: 1
NECK PAIN: 1
DIFFICULTY URINATING: 1
NAIL CHANGES: 1
PARALYSIS: 0
WEIGHT GAIN: 0
VOMITING: 1
MYALGIAS: 1
TREMORS: 0
DOUBLE VISION: 1
HYPERTENSION: 0
PANIC: 1
EYE REDNESS: 1
HYPOTENSION: 1
BLOATING: 1
DECREASED CONCENTRATION: 1
EXERCISE INTOLERANCE: 1
LOSS OF CONSCIOUSNESS: 1
TINGLING: 1
EYE IRRITATION: 1
COUGH DISTURBING SLEEP: 1
MUSCLE CRAMPS: 1
CHILLS: 1
ORTHOPNEA: 1
STIFFNESS: 1
RESPIRATORY PAIN: 0
BLOOD IN STOOL: 1
CLAUDICATION: 1
MUSCLE WEAKNESS: 1
HEARTBURN: 1
PALPITATIONS: 1
HEMATURIA: 1
BOWEL INCONTINENCE: 1
DISTURBANCES IN COORDINATION: 1
SPEECH CHANGE: 1
NERVOUS/ANXIOUS: 1
DECREASED APPETITE: 1
DYSPNEA ON EXERTION: 1
COUGH: 1
POLYDIPSIA: 1
DIARRHEA: 1
JOINT SWELLING: 1
LIGHT-HEADEDNESS: 1
FEVER: 1
DYSURIA: 1

## 2017-06-01 ASSESSMENT — PAIN SCALES - GENERAL: PAINLEVEL: NO PAIN (0)

## 2017-06-01 NOTE — MR AVS SNAPSHOT
After Visit Summary   6/1/2017    Parker Acevedo    MRN: [id number]           Patient Information     Date Of Birth          1970        Visit Information        Provider Department      6/1/2017 7:40 AM Francis Vyas MD [facility]        Today's Diagnoses     Malnutrition (H)    -  1       Follow-ups after your visit        Follow-up notes from your care team     Return in about 1 year (around 6/1/2018).      Your next 10 appointments already scheduled     Jul 05, 2017  2:30 PM CDT   Return Visit with Patti Milligan MD   [facility] ([facility])    [address] [address] [phone]            Aug 01, 2017 11:00 AM CDT   Office Visit with Esteban Daly MD   [facility]    [address], [address]  [place] [place] [facility] [address] [phone]           Bring a current list of meds and any records pertaining to this visit.  For Physicals, please bring immunization records and any forms needing to be filled out.  Please arrive 10 minutes early to complete paperwork.              Who to contact     Please call your clinic at [phone] to:    Ask questions about your health    Make or cancel appointments    Discuss your medicines    Learn about your test results    Speak to your doctor   If you have compliments or concerns about an experience at your clinic, or if you wish to file a complaint, please contact [facility] Patient Relations at [phone] or email us at [email or web address]         Additional Information About Your Visit        MyChart Information     [employer] gives you secure access to your electronic health record. If you see a primary care provider, you can also send messages to your care team and make appointments. If you have questions, please call your primary care clinic.  If you do not  "have a primary care provider, please call [phone] and they will assist you.      [employer] is an electronic gateway that provides easy, online access to your medical records. With [employer], you can request a clinic appointment, read your test results, renew a prescription or communicate with your care team.     To access your existing account, please contact your [facility] or call [phone] for assistance.        Care EveryWhere ID     This is your Care EveryWhere ID. This could be used by other organizations to access your [place] medical records  [id number]        Your Vitals Were     Pulse Temperature Height Pulse Oximetry BMI (Body Mass Index)       58 98.3  F (36.8  C) (Oral) 5' 11\" 98% 27.43 kg/m2        Blood Pressure from Last 3 Encounters:   06/01/17 136/80   05/09/17 130/82   04/11/17 118/62    Weight from Last 3 Encounters:   06/01/17 196 lb 11.2 oz   05/09/17 174 lb 4.8 oz   04/11/17 174 lb              Today, you had the following     No orders found for display       Primary Care Provider Office Phone # Fax #    Esteban Daly [phone]       [facility] [id number] [place] [address]        Thank you!     Thank you for choosing [facility]  for your care. Our goal is always to provide you with excellent care. Hearing back from our patients is one way we can continue to improve our services. Please take a few minutes to complete the written survey that you may receive in the mail after your visit with us. Thank you!             Your Updated Medication List - Protect others around you: Learn how to safely use, store and throw away your medicines at www.disposemymeds.org.          This list is accurate as of: 6/1/17  9:35 AM.  Always use your most recent med list.                   Brand Name Dispense Instructions for use    albuterol 108 (90 BASE) MCG/ACT Inhaler    PROAIR HFA/PROVENTIL " "HFA/VENTOLIN HFA    1 Inhaler    Inhale 2 puffs into the lungs every 4 hours as needed for shortness of breath / dyspnea or wheezing       * amphetamine-dextroamphetamine 20 MG per tablet    ADDERALL    60 tablet    Take 1 tablet (20 mg) by mouth 2 times daily       * amphetamine-dextroamphetamine 20 MG per tablet    ADDERALL    60 tablet    Take 1 tablet (20 mg) by mouth 2 times daily       * amphetamine-dextroamphetamine 20 MG per tablet   Start taking on:  6/5/2017    ADDERALL    60 tablet    Take 1 tablet (20 mg) by mouth 2 times daily       * amphetamine-dextroamphetamine 20 MG per tablet   Start taking on:  7/3/2017    ADDERALL    60 tablet    Take 1 tablet (20 mg) by mouth 2 times daily       cyanocobalamin 1000 MCG/ML injection    VITAMIN B12    1 mL    Inject 1 mL (1,000 mcg) into the muscle every 30 days       diazepam 5 MG tablet    VALIUM    30 tablet    Take 1 tablet (5 mg) by mouth daily as needed for anxiety or sleep       * [facility]IENT      Contact [facility] for patient specific medication information at [phone] on admission and discharge from the hospital.  Phones are answered 24 hours a day 7 days a week 365 days a year.  Providers - Choose \"CONTINUE HOME MED (no script)\" at discharge if patient treatment with home infusion will continue.       * [facility] PATIENT      Contact [facility] for patient specific medication information at [phone] on admission and discharge from the hospital.  Phones are answered 24 hours a day 7 days a week 365 days a year.  Providers - Choose \"CONTINUE HOME MED (no script)\" at discharge if patient treatment with home infusion will continue.       fentaNYL 50 mcg/hr 72 hr patch    DURAGESIC    15 patch    Place 1 patch onto the skin every 48 hours MYLAN BRAND ONLY. Fill on/after 5/24/17 to start on/after 5/26/17       lidocaine-prilocaine cream    EMLA    30 g    Apply topically as " "needed for moderate pain       morphine 0.1% in intrasite topical gel     100 g    Apply as needed prior to accessing the port site.       mupirocin 2 % ointment    BACTROBAN    30 g    Apply topically 3 times daily       Needle (Disp) 27G X 1/2\" Misc    BD DISP NEEDLES    3 each    1 Device every 30 days Use for cyanocobalamin injection once q 30 days.       nystatin-triamcinolone cream    MYCOLOG II    60 g    Apply topically 2 times daily as needed       ondansetron 8 MG ODT tab    ZOFRAN-ODT    90 tablet    Take 1 tablet (8 mg) by mouth every 8 hours as needed for nausea       * order for DME     12 each    Injection Supplies for Vitamin B12: 3cc syringes w/ 27 gauge needles, 1/2 inch length       * order for DME     4 each    Equipment being ordered: Bilateral knee high chronic venous insufficiency stockings--  mild-moderate pressures.       oxyCODONE 5 MG/5ML solution    ROXICODONE    1350 mL    Take 10-15 mLs (10-15 mg) by mouth every 4 hours as needed for moderate to severe pain Max of 45mg per day. Fill on/after 5/24/17 not to start till 5/25/17.       senna-docusate 8.6-50 MG per tablet    SENOKOT-S;PERICOLACE    120 tablet    Take 1-2 tablets by mouth 2 times daily as needed for constipation       sucralfate 1 GM/10ML suspension    CARAFATE    1200 mL    Take 10 mLs (1 g) by mouth 4 times daily       vitamin D 82468 UNIT capsule    ERGOCALCIFEROL    12 capsule    Take 1 capsule (50,000 Units) by mouth every 7 days       * Notice:  This list has 8 medication(s) that are the same as other medications prescribed for you. Read the directions carefully, and ask your doctor or other care provider to review them with you.      "

## 2017-06-01 NOTE — NURSING NOTE
"Chief Complaint   Patient presents with     RECHECK     Tube replacement       Vitals:    06/01/17 0804   BP: 136/80   Pulse: 58   Temp: 98.3  F (36.8  C)   TempSrc: Oral   SpO2: 98%   Weight: 196 lb 11.2 oz   Height: 5' 11\"       Body mass index is 27.43 kg/(m^2).    Anupam Grover CMA                          "

## 2017-06-01 NOTE — PROGRESS NOTES
Dr. Vyas pt, orders  Received: Today       Arabella Diallo, RN  Kamala Burnett, RN       Phone Number: [phone]                     [facility] Vyas to get order to remove PIC, can use PORT instead.     Thanks, Kacie       Ok per Dr. Vyas to remove PICC and use port if patient has been cleared by infectious disease. Note from ID nurse 5/18/2017 noting that Dr. Thompson has ok'd port use.

## 2017-06-01 NOTE — LETTER
6/1/2017       RE: Parker Acevedo  [address] [place] [address]     Dear Colleague,    Thank you for referring your patient, Parker Acevedo, to the [facility] at [facility]. Please see a copy of my visit note below.    I had the pleasure of seeing your patient, Parker Acevedo, in my post-bariatric assessment clinic.    As you know, he is morbidly obese and underwent prior open gastric bypass requiring several revisional operations.    Has had gastric remnant dilatation and has current g-tube in place, COLIN 18Fr, 3.0cm.    Uses the g-tube for venting.    The g-tube balloon popped.  He has had it in place for a long time now.    Here for g-tube replacement.    Most recent weights:  Wt Readings from Last 4 Encounters:   06/01/17 196 lb 11.2 oz   05/09/17 174 lb 4.8 oz   04/11/17 174 lb   04/05/17 195 lb       Currently, his Body mass index is 27.43 kg/(m^2).    ACTIVE MEDICAL PROBLEMS  Patient Active Problem List   Diagnosis     Peptic ulcer disease     Gastric bypass status for obesity     CARDIOVASCULAR SCREENING; LDL GOAL LESS THAN 160     Chronic abdominal pain     Ulcer (H)     Vomiting     Anemia     ADHD (attention deficit hyperactivity disorder), inattentive type     Vitamin B12 deficiency without anemia     Thiamine deficiency     Dehydration     Dysphagia     Weight loss, non-intentional     Malnutrition (H)     Chronic anxiety     Constipation     Bile reflux esophagitis     Former smoker     Vitamin D deficiency     Iron deficiency     Health Care Home     Chronic pain     Coagulase negative staph bacteremia associated with intravascular line (H)     Insomnia     Positive blood culture     Fungemia     Chronic nausea     Gastrostomy tube in place (H)     Cervical radiculopathy     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)     Severe malnutrition (H)     Short gut syndrome     Anxiety     ADHD (attention deficit  "hyperactivity disorder)     Status post cervical spinal arthrodesis     Port or reservoir infection, initial encounter     Abnormal echocardiogram      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)      Chronic abdominal pain      Difficulty swallowing      Gastric ulcer, unspecified as acute or chronic, without mention of hemorrhage, perforation, or obstruction      Gastro-oesophageal reflux disease      Head injury      Hiatal hernia      Other bladder disorder      Other chronic pain      Severe malnutrition (H)     TPN     Short gut syndrome      Tobacco abuse        PHYSICAL EXAMINATION  /80  Pulse 58  Temp 98.3  F (36.8  C) (Oral)  Ht 5' 11\"  Wt 196 lb 11.2 oz  SpO2 98%  BMI 27.43 kg/m2   Body mass index is 27.43 kg/(m^2).   NAD NCAT  Respiratory: breathing unlabored  Abdomen: s/nt/nd; inc c/d/i; g-tube in place with tape to keep it from falling out.    I reviewed the post-bariatric clinic questionnaire available in the epic system as a separate document.     Vitamins: mvi bid, vitamin B12 IM qmonth, and calcium citrate 1200 mg + qday.    Ursodiol: not needed.    Fluid intake: enough oz per day      Tolerating regular texture foods: yes    I emphasized exercise and activity behavior along with appropriate food choice as the main foundation for weight loss with surgery providing surgical reinforcement of the appropriate behavior set.    Use the fork rule for all eating. [If you can't pick food up with a fork, then the food will not turn off hunger very well. Using this rule helps patients avoid choosing the wrong types of food for pouch operations. The wrong foods include liquid calories such as soup, juice, soda, slimfast, ice cream, and beer, crumbly foods such as chips, crackers, and " cookies, and starch based foods such as pasta, too much rice, and too much bread.]     Special testing: none now    No orders of the defined types were placed in this encounter.      I replaced the g-tube with a 18French, 3.0 COLIN low profile g-tube.  Balloon to 5cc with tap water.    PLAN  1. See orders from encounter.  2. F/u routine bariatric diet guidelines.  3. Focus on eating until hunger goes away rather than eating all the way to full.   3. Start with protein sources of food, move to leafy vegetables next, and then eat solid fruits.   4. Follow-up: 12 months.     If you have any questions about our plans, please don't hesitate to contact me.   Francis Vyas MD  Surgery  [phone] (hospital )  [phone] (clinic nurses)      Main follow-up parameters for all bariatric patients (non-band):   1. Follow-up interval during the first year: ~1 week, 1 month, 3, 6, 9, 12 months.  Every 6 months until 5 years; annually thereafter.  The goal of follow-up sessions is to ensure that food intake behavior (choice of food and eating speed) and exercise/activity level are set appropriately.  2. Yearly lab tests ordered by our clinic.   3. The bariatric team should be aware and potentially evaluate all adverse gastrointestinal symptoms (dysphagia, abdominal pain, nausea, vomiting, diarrhea, heartburn, reflux, etc), which can be a sign of complication.  The bariatric surgeons perform general surgery procedures in addition to bariatric surgery (laparoscopic cholecystectomy, etc.)  4. Inability to tolerate textured food (chicken, steak, fish, eggs, beans) is NOT normal and may need to be evaluated by endoscopy performed by the bariatric surgeon.   5. All non-band patients should supplement with mvi bid (flintstones or equivalent), calcium citrate with vitamin D, 1200+ mg/day, and vitamin B12 1000 mcg IM q month (or 1000 mcg SL qday).  6. All duodenal switch (biliopancreatic diversion with duodenal switch)  patients require additional fat soluble vitamin supplementation (3 ADEK tablets every day; each contains vitamin A - 5667 IU; vitamin D - 400 IU; vitamin E - 150 IU; and vitamin K - 150 mcg)  Answers for HPI/ROS submitted by the patient on 6/1/2017   Francis Vyas MD

## 2017-06-01 NOTE — PROGRESS NOTES
I had the pleasure of seeing your patient, Parker Acevedo, in my post-bariatric assessment clinic.    As you know, he is morbidly obese and underwent prior open gastric bypass requiring several revisional operations.    Has had gastric remnant dilatation and has current g-tube in place, COLIN 18Fr, 3.0cm.    Uses the g-tube for venting.    The g-tube balloon popped.  He has had it in place for a long time now.    Here for g-tube replacement.    Most recent weights:  Wt Readings from Last 4 Encounters:   06/01/17 196 lb 11.2 oz   05/09/17 174 lb 4.8 oz   04/11/17 174 lb   04/05/17 195 lb       Currently, his Body mass index is 27.43 kg/(m^2).    ACTIVE MEDICAL PROBLEMS  Patient Active Problem List   Diagnosis     Peptic ulcer disease     Gastric bypass status for obesity     CARDIOVASCULAR SCREENING; LDL GOAL LESS THAN 160     Chronic abdominal pain     Ulcer (H)     Vomiting     Anemia     ADHD (attention deficit hyperactivity disorder), inattentive type     Vitamin B12 deficiency without anemia     Thiamine deficiency     Dehydration     Dysphagia     Weight loss, non-intentional     Malnutrition (H)     Chronic anxiety     Constipation     Bile reflux esophagitis     Former smoker     Vitamin D deficiency     Iron deficiency     Health Care Home     Chronic pain     Coagulase negative staph bacteremia associated with intravascular line (H)     Insomnia     Positive blood culture     Fungemia     Chronic nausea     Gastrostomy tube in place (H)     Cervical radiculopathy     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)     Severe malnutrition (H)     Short gut syndrome     Anxiety     ADHD (attention deficit hyperactivity disorder)     Status post cervical spinal arthrodesis     Port or reservoir infection, initial encounter     Abnormal echocardiogram      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)     mild  "EF ~45% on rest 2/13/17, improves with stressing     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)      Chronic abdominal pain      Difficulty swallowing      Gastric ulcer, unspecified as acute or chronic, without mention of hemorrhage, perforation, or obstruction      Gastro-oesophageal reflux disease      Head injury      Hiatal hernia      Other bladder disorder      Other chronic pain      Severe malnutrition (H)     TPN     Short gut syndrome      Tobacco abuse        PHYSICAL EXAMINATION  /80  Pulse 58  Temp 98.3  F (36.8  C) (Oral)  Ht 5' 11\"  Wt 196 lb 11.2 oz  SpO2 98%  BMI 27.43 kg/m2   Body mass index is 27.43 kg/(m^2).   NAD NCAT  Respiratory: breathing unlabored  Abdomen: s/nt/nd; inc c/d/i; g-tube in place with tape to keep it from falling out.    I reviewed the post-bariatric clinic questionnaire available in the epic system as a separate document.     Vitamins: mvi bid, vitamin B12 IM qmonth, and calcium citrate 1200 mg + qday.    Ursodiol: not needed.    Fluid intake: enough oz per day      Tolerating regular texture foods: yes    I emphasized exercise and activity behavior along with appropriate food choice as the main foundation for weight loss with surgery providing surgical reinforcement of the appropriate behavior set.    Use the fork rule for all eating. [If you can't pick food up with a fork, then the food will not turn off hunger very well. Using this rule helps patients avoid choosing the wrong types of food for pouch operations. The wrong foods include liquid calories such as soup, juice, soda, slimfast, ice cream, and beer, crumbly foods such as chips, crackers, and cookies, and starch based foods such as pasta, too much rice, and too much bread.]     Special testing: none now    No orders of the defined types were placed in this encounter.      I replaced the g-tube with a 18French, 3.0 COLIN low profile g-tube.  Balloon to 5cc with tap water.    PLAN  1. See orders from " encounter.  2. F/u routine bariatric diet guidelines.  3. Focus on eating until hunger goes away rather than eating all the way to full.   3. Start with protein sources of food, move to leafy vegetables next, and then eat solid fruits.   4. Follow-up: 12 months.       If you have any questions about our plans, please don't hesitate to contact me.         Francis Vyas MD  Surgery  [phone] (hospital )  [phone] (clinic nurses)                  Main follow-up parameters for all bariatric patients (non-band):   1. Follow-up interval during the first year: ~1 week, 1 month, 3, 6, 9, 12 months.  Every 6 months until 5 years; annually thereafter.  The goal of follow-up sessions is to ensure that food intake behavior (choice of food and eating speed) and exercise/activity level are set appropriately.  2. Yearly lab tests ordered by our clinic.   3. The bariatric team should be aware and potentially evaluate all adverse gastrointestinal symptoms (dysphagia, abdominal pain, nausea, vomiting, diarrhea, heartburn, reflux, etc), which can be a sign of complication.  The bariatric surgeons perform general surgery procedures in addition to bariatric surgery (laparoscopic cholecystectomy, etc.)  4. Inability to tolerate textured food (chicken, steak, fish, eggs, beans) is NOT normal and may need to be evaluated by endoscopy performed by the bariatric surgeon.   5. All non-band patients should supplement with mvi bid (flintstones or equivalent), calcium citrate with vitamin D, 1200+ mg/day, and vitamin B12 1000 mcg IM q month (or 1000 mcg SL qday).  6. All duodenal switch (biliopancreatic diversion with duodenal switch) patients require additional fat soluble vitamin supplementation (3 ADEK tablets every day; each contains vitamin A - 5667 IU; vitamin D - 400 IU; vitamin E - 150 IU; and vitamin K - 150 mcg)        Answers for HPI/ROS submitted by the patient on 6/1/2017   General Symptoms: Yes  Skin Symptoms:  Yes  HENT Symptoms: No  EYE SYMPTOMS: Yes  HEART SYMPTOMS: Yes  LUNG SYMPTOMS: Yes  INTESTINAL SYMPTOMS: Yes  URINARY SYMPTOMS: Yes  REPRODUCTIVE SYMPTOMS: No  SKELETAL SYMPTOMS: Yes  BLOOD SYMPTOMS: No  NERVOUS SYSTEM SYMPTOMS: Yes  MENTAL HEALTH SYMPTOMS: Yes  Fever: Yes  Loss of appetite: Yes  Weight loss: Yes  Weight gain: No  Fatigue: Yes  Night sweats: Yes  Chills: Yes  Increased stress: Yes  Excessive hunger: No  Excessive thirst: Yes  Feeling hot or cold when others believe the temperature is normal: Yes  Loss of height: No  Post-operative complications: No  Surgical site pain: Yes  Hallucinations: No  Change in or Loss of Energy: Yes  Hyperactivity: Yes  Confusion: Yes  Changes in hair: Yes  Changes in moles/birth marks: Yes  Itching: Yes  Rashes: Yes  Changes in nails: Yes  Acne: Yes  Change in facial hair: Yes  Warts: No  Non-healing sores: Yes  Scarring: Yes  Flaking of skin: Yes  Color changes of hands/feet in cold : Yes  Sun sensitivity: Yes  Skin thickening: No  Eye pain: Yes  Vision loss: Yes  Dry eyes: Yes  Watery eyes: Yes  Eye bulging: Yes  Double vision: Yes  Flashing of lights: Yes  Spots: Yes  Floaters: Yes  Redness: Yes  Crossed eyes: No  Tunnel Vision: Yes  Yellowing of eyes: No  Eye irritation: Yes  Cough: Yes  Sputum or phlegm: Yes  Coughing up blood: Yes  Difficulty breating or shortness of breath: Yes  Snoring: Yes  Wheezing: Yes  Difficulty breathing on exertion: Yes  Respiratory pain: No  Nighttime Cough: Yes  Difficulty breathing when lying flat: Yes  Chest pain or pressure: Yes  Fast or irregular heartbeat: Yes  Pain in legs with walking: Yes  Swelling in feet or ankles: Yes  Trouble breathing while lying down: Yes  Fingers or Toes appear blue: Yes  High blood pressure: No  Low blood pressure: Yes  Fainting: No  Murmurs: Yes  Chest pain on exertion: Yes  Chest pain at rest: Yes  Cramping pain in leg during exercise: Yes  Pacemaker: No  Varicose veins: No  Edema or swelling: Yes  Fast  heart beat: Yes  Wake up at night with shortness of breath: Yes  Heart flutters: Yes  Light-headedness: Yes  Exercise intolerance: Yes  Heart burn or indigestion: Yes  Nausea: Yes  Vomiting: Yes  Abdominal pain: Yes  Bloating: Yes  Constipation: Yes  Diarrhea: Yes  Blood in stool: Yes  Black stools: Yes  Rectal or Anal pain: No  Fecal incontinence: Yes  Rectal bleeding: Yes  Yellowing of skin or eyes: No  Vomit with blood: Yes  Change in stools: Yes  Hemorrhoids: No  Trouble holding urine or incontinence: No  Pain or burning: Yes  Trouble starting or stopping: Yes  Increased frequency of urination: No  Blood in urine: Yes  Decreased frequency of urination: Yes  Frequent nighttime urination: No  Flank pain: No  Difficulty emptying bladder: Yes  Back pain: Yes  Muscle aches: Yes  Neck pain: Yes  Swollen joints: Yes  Joint pain: Yes  Bone pain: Yes  Muscle cramps: Yes  Muscle weakness: Yes  Joint stiffness: Yes  Bone fracture: Yes  Trouble with coordination: Yes  Dizziness or trouble with balance: Yes  Fainting or black-out spells: Yes  Memory loss: Yes  Headache: Yes  Seizures: No  Speech problems: Yes  Tingling: Yes  Tremor: No  Weakness: Yes  Difficulty walking: Yes  Paralysis: No  Numbness: Yes  Nervous or Anxious: Yes  Depression: Yes  Trouble sleeping: Yes  Trouble thinking or concentrating: Yes  Mood changes: Yes  Panic attacks: Yes

## 2017-06-02 ENCOUNTER — HOME INFUSION (PRE-WILLOW HOME INFUSION) (OUTPATIENT)
Dept: PHARMACY | Facility: CLINIC | Age: 47
End: 2017-06-02

## 2017-06-05 ENCOUNTER — MYC MEDICAL ADVICE (OUTPATIENT)
Dept: PALLIATIVE MEDICINE | Facility: CLINIC | Age: 47
End: 2017-06-05

## 2017-06-05 DIAGNOSIS — Z79.891 ENCOUNTER FOR LONG-TERM OPIATE ANALGESIC USE: ICD-10-CM

## 2017-06-05 DIAGNOSIS — G89.29 CHRONIC ABDOMINAL PAIN: ICD-10-CM

## 2017-06-05 DIAGNOSIS — R10.9 CHRONIC ABDOMINAL PAIN: ICD-10-CM

## 2017-06-06 ENCOUNTER — DOCUMENTATION ONLY (OUTPATIENT)
Dept: NEUROSURGERY | Facility: CLINIC | Age: 47
End: 2017-06-06

## 2017-06-06 ENCOUNTER — TELEPHONE (OUTPATIENT)
Dept: SURGERY | Facility: CLINIC | Age: 47
End: 2017-06-06

## 2017-06-06 ENCOUNTER — HOME INFUSION (PRE-WILLOW HOME INFUSION) (OUTPATIENT)
Dept: PHARMACY | Facility: CLINIC | Age: 47
End: 2017-06-06

## 2017-06-06 ENCOUNTER — TELEPHONE (OUTPATIENT)
Dept: FAMILY MEDICINE | Facility: CLINIC | Age: 47
End: 2017-06-06

## 2017-06-06 NOTE — TELEPHONE ENCOUNTER
Araseli, Nurse  Kerri, calling for Dr. Vyas.  What is the status of Parker working? Has been out since 8/22/14.     Will route to Leah with Dr. Vyas.

## 2017-06-06 NOTE — TELEPHONE ENCOUNTER
Araseli from [facility] #[phone] ext [phone] called  She is requesting information about return to work dates/ restrictions.    Is there a return to work date?  What restrictions would he have?  Details.

## 2017-06-06 NOTE — TELEPHONE ENCOUNTER
Esther Canales,    We require 7 to 10 days at the most to process your prescriptions. Its a little bit too early for me to process these. Can you MyChart this request closer to your due date, which will be to Fill on/after 6/23/17 to start on/after 6/25/17. The 15th would be the earliest we would typically want you to request them in this case.    Thank You,  ERIN Lazar, RN  Care Coordinator  [facility]      ----- Message -----     From: Parker Acevedo     Sent: 6/5/2017  5:44 PM CDT       To: Patti Milligan MD  Subject: To order my fentynal and oxycodone    I will need percriptions sent over to [facility] on the 23rd of June for a refill of the fentynal patches and the oxycodone  Thank you again And that was [facility] in [place] any questions feel free to call

## 2017-06-06 NOTE — TELEPHONE ENCOUNTER
Received call from patient requesting refill(s) of fentaNYL (DURAGESIC) 50 mcg/hr 72 hr patch  AND oxyCODONE (ROXICODONE) 5 MG/5ML solution    Last picked up from pharmacy on 5/24/17    Pt last seen by prescribing provider on 4/5/17  Next appt scheduled for 7/5/17    Last urine drug screen date 4/5/17  Current opioid agreement on file (completed within the last year) Yes Date of opioid agreement: 11/17/16    Processing (pick one and delete the others):  [place] to [facility] [place] [address]    Will route to nursing pool for review and preparation of prescription(s).

## 2017-06-06 NOTE — PROGRESS NOTES
[employer] - Physical ability assessment form complete  Faxed to [phone]  Placed the original to medical records and sent a copy to the bin

## 2017-06-06 NOTE — TELEPHONE ENCOUNTER
Reviewed--    !. The patient continues to be totally disabled due to ongoing malnutrition, needing total parenteral nutrition secondary to complications from bariatric surgery. Patient has profound generalized weakness, some dizziness and instability with occasional falls. He'll not be able to return to work at any time in the future as his current situation is permanent.    2. Restrictions would include, but not limited to, minimal standing and walking, no lifting, stooping or bending. Would be able to do a minimal sedentary job for less than 1 hour per day.     Esteban Daly MD  Please close encounter when call/work completed.

## 2017-06-06 NOTE — TELEPHONE ENCOUNTER
Also - Received forms from Kerri  Phone - [phone] ext [phone]  Fax - [phone]    Placed forms in Providers inbox to complete and fax back.

## 2017-06-06 NOTE — TELEPHONE ENCOUNTER
[employer] message from patient Created: 6/5/2017 5:44 PM Routing to MA pool for processing.    I will need percriptions sent over to [facility] on the 23rd of June for a refill of the fentynal patches and the oxycodone  Thank you again And that was [facility] in [place] any questions feel free to call

## 2017-06-07 ENCOUNTER — HOME INFUSION (PRE-WILLOW HOME INFUSION) (OUTPATIENT)
Dept: PHARMACY | Facility: CLINIC | Age: 47
End: 2017-06-07

## 2017-06-07 RX ORDER — OXYCODONE HCL 5 MG/5 ML
10-15 SOLUTION, ORAL ORAL EVERY 4 HOURS PRN
Qty: 1350 ML | Refills: 0 | Status: SHIPPED | OUTPATIENT
Start: 2017-06-07 | End: 2017-07-05

## 2017-06-07 RX ORDER — FENTANYL 50 UG/1
1 PATCH TRANSDERMAL
Qty: 15 PATCH | Refills: 0 | Status: SHIPPED | OUTPATIENT
Start: 2017-06-07 | End: 2017-07-05

## 2017-06-07 NOTE — TELEPHONE ENCOUNTER
Medication refill information reviewed.     Due date for Fentanyl is 6/25/2017  and Oxycodone is 6/24/2017     Prescriptions prepped for review.     Will route to provider.     Karo Nuñez RN, [facility]  Pain Clinic Care Coordinator

## 2017-06-07 NOTE — TELEPHONE ENCOUNTER
Signed Prescriptions:                        Disp   Refills    oxyCODONE (ROXICODONE) 5 MG/5ML solution   1350 mL0        Sig: Take 10-15 mLs (10-15 mg) by mouth every 4 hours as           needed for moderate to severe pain Max of 45mg           per day. Fill on/after 6/23/17 not to start till           6/24/17.  Authorizing Provider: BRANDYN JENKINS    fentaNYL (DURAGESIC) 50 mcg/hr 72 hr patch 15 pat*0        Sig: Place 1 patch onto the skin every 48 hours MYLAN           BRAND ONLY. Fill on/after 6/23/17 to start           on/after 6/25/17  Authorizing Provider: BRANDYN JENKINS MD  [facility]

## 2017-06-08 ENCOUNTER — TELEPHONE (OUTPATIENT)
Dept: INFECTIOUS DISEASES | Facility: CLINIC | Age: 47
End: 2017-06-08

## 2017-06-08 ENCOUNTER — HOME INFUSION (PRE-WILLOW HOME INFUSION) (OUTPATIENT)
Dept: PHARMACY | Facility: CLINIC | Age: 47
End: 2017-06-08

## 2017-06-08 NOTE — TELEPHONE ENCOUNTER
ARIEL. Script for fentanyl and oxycodone was signed and mailed out to  pharmacy in [place].       Lyndsay Solo MA

## 2017-06-08 NOTE — TELEPHONE ENCOUNTER
Wife Rose called to report findings around port site she noticed as she was de-accessing port (port currently de-accessed). Skin is red/inflammed around the site. Discharge that is brown/red/yellow and crusty that is encompassing the entire port site. Afebrile, denies SOB, no chest pain, but is feeling fatigued/ not much energy.   Educated wife and patient to seek more urgent medical treatment should fever climb about 101, any changes in mental status, wife agrees.    Will route to Dr. Thompson's team for review. Patient and wife can be reached at [phone]

## 2017-06-08 NOTE — TELEPHONE ENCOUNTER
Disability forms completed and to be faxed back. Copy to be kept and scanned into EPIC.    Esteban Daly MD

## 2017-06-12 ENCOUNTER — HOME INFUSION (PRE-WILLOW HOME INFUSION) (OUTPATIENT)
Dept: PHARMACY | Facility: CLINIC | Age: 47
End: 2017-06-12

## 2017-06-12 ENCOUNTER — MEDICAL CORRESPONDENCE (OUTPATIENT)
Dept: HEALTH INFORMATION MANAGEMENT | Facility: CLINIC | Age: 47
End: 2017-06-12

## 2017-06-12 ENCOUNTER — CARE COORDINATION (OUTPATIENT)
Dept: CARE COORDINATION | Facility: CLINIC | Age: 47
End: 2017-06-12

## 2017-06-12 LAB
ALBUMIN SERPL-MCNC: 3.1 G/DL (ref 3.4–5)
ALP SERPL-CCNC: 68 U/L (ref 40–150)
ALT SERPL W P-5'-P-CCNC: 24 U/L (ref 0–70)
ANION GAP SERPL CALCULATED.3IONS-SCNC: 10 MMOL/L (ref 3–14)
AST SERPL W P-5'-P-CCNC: ABNORMAL U/L (ref 0–45)
BASOPHILS # BLD AUTO: 0 10E9/L (ref 0–0.2)
BASOPHILS NFR BLD AUTO: 0.5 %
BILIRUB DIRECT SERPL-MCNC: <0.1 MG/DL (ref 0–0.2)
BILIRUB SERPL-MCNC: 0.6 MG/DL (ref 0.2–1.3)
BUN SERPL-MCNC: 15 MG/DL (ref 7–30)
CALCIUM SERPL-MCNC: 8.5 MG/DL (ref 8.5–10.1)
CHLORIDE SERPL-SCNC: 110 MMOL/L (ref 94–109)
CK SERPL-CCNC: NORMAL U/L (ref 30–300)
CO2 SERPL-SCNC: 22 MMOL/L (ref 20–32)
CREAT SERPL-MCNC: 0.52 MG/DL (ref 0.66–1.25)
DIFFERENTIAL METHOD BLD: ABNORMAL
EOSINOPHIL # BLD AUTO: 0.3 10E9/L (ref 0–0.7)
EOSINOPHIL NFR BLD AUTO: 4 %
ERYTHROCYTE [DISTWIDTH] IN BLOOD BY AUTOMATED COUNT: 14.6 % (ref 10–15)
GFR SERPL CREATININE-BSD FRML MDRD: ABNORMAL ML/MIN/1.7M2
GLUCOSE SERPL-MCNC: 90 MG/DL (ref 70–99)
HCT VFR BLD AUTO: 37.7 % (ref 40–53)
HGB BLD-MCNC: 12 G/DL (ref 13.3–17.7)
IMM GRANULOCYTES # BLD: 0 10E9/L (ref 0–0.4)
IMM GRANULOCYTES NFR BLD: 0.2 %
LYMPHOCYTES # BLD AUTO: 1.9 10E9/L (ref 0.8–5.3)
LYMPHOCYTES NFR BLD AUTO: 29.4 %
MAGNESIUM SERPL-MCNC: 2.1 MG/DL (ref 1.6–2.3)
MCH RBC QN AUTO: 30.2 PG (ref 26.5–33)
MCHC RBC AUTO-ENTMCNC: 31.8 G/DL (ref 31.5–36.5)
MCV RBC AUTO: 95 FL (ref 78–100)
MONOCYTES # BLD AUTO: 0.7 10E9/L (ref 0–1.3)
MONOCYTES NFR BLD AUTO: 11.2 %
NEUTROPHILS # BLD AUTO: 3.6 10E9/L (ref 1.6–8.3)
NEUTROPHILS NFR BLD AUTO: 54.7 %
NRBC # BLD AUTO: 0 10*3/UL
NRBC BLD AUTO-RTO: 0 /100
PHOSPHATE SERPL-MCNC: 3.1 MG/DL (ref 2.5–4.5)
PLATELET # BLD AUTO: 147 10E9/L (ref 150–450)
POTASSIUM SERPL-SCNC: 6.4 MMOL/L (ref 3.4–5.3)
PREALB SERPL IA-MCNC: 20 MG/DL (ref 15–45)
PROT SERPL-MCNC: 6.9 G/DL (ref 6.8–8.8)
RBC # BLD AUTO: 3.97 10E12/L (ref 4.4–5.9)
SODIUM SERPL-SCNC: 142 MMOL/L (ref 133–144)
TRIGL SERPL-MCNC: 66 MG/DL
WBC # BLD AUTO: 6.5 10E9/L (ref 4–11)

## 2017-06-12 PROCEDURE — 82550 ASSAY OF CK (CPK): CPT | Performed by: SURGERY

## 2017-06-12 PROCEDURE — 84134 ASSAY OF PREALBUMIN: CPT | Performed by: SURGERY

## 2017-06-12 PROCEDURE — 84478 ASSAY OF TRIGLYCERIDES: CPT | Performed by: SURGERY

## 2017-06-12 PROCEDURE — 83735 ASSAY OF MAGNESIUM: CPT | Performed by: SURGERY

## 2017-06-12 PROCEDURE — 84100 ASSAY OF PHOSPHORUS: CPT | Performed by: SURGERY

## 2017-06-12 PROCEDURE — 82248 BILIRUBIN DIRECT: CPT | Performed by: SURGERY

## 2017-06-12 PROCEDURE — 85025 COMPLETE CBC W/AUTO DIFF WBC: CPT | Performed by: SURGERY

## 2017-06-12 PROCEDURE — 80053 COMPREHEN METABOLIC PANEL: CPT | Performed by: SURGERY

## 2017-06-12 NOTE — PROGRESS NOTES
Clinic Care Coordination Contact  OUTREACH    Referral Information:  Referral Source: IP/TCU Report  Reason for Contact: RN CC call to patient for follow up  Care Conference: No     Universal Utilization:   ED Visits in last year: 5  Hospital visits in last year: 2  Last PCP appointment: 05/02/17  Missed Appointments: 1  Concerns: yes, high utilization  Multiple Providers or Specialists: yes    Clinical Concerns:  Current Medical Concerns: Patient's G-tube was recently replaced on 6/1/17.  Patient's port was accessed 1 week ago and when it was removed on 6/8/17 patient's spouse noticed the area was sore, red and had yellow and brown discharge.  Patient has not had a temperature over 100.  Spouse called infectious disease on 6/8/17 to report the findings and has not heard back.  Spouse reports [facility] was out to see patient today and they will continue using patient's PICC until the port site is evaluated.  Spouse states if she does not hear back from infectious disease by tomorrow she will schedule an appointment with PCP to evaluate patient's port site.    Current Behavioral Concerns: Has diagnoses of ADHD and anxiety.     Education Provided to patient: RN CLARITZA reviewed when to contact provider.   Clinical Pathway Name: None    Medication Management:  Spouse manages patient's medications.     Functional Status:  Mobility Status: Independent  Equipment Currently Used at Home: cane, straight  Transportation: Spouse provides.           Psychosocial:  Current living arrangement:: I live in a private home with spouse  Financial/Insurance: Patient and spouse are now able to return their current home month by month at a higher rate until they find a home to purchase.       Resources and Interventions:  Current Resources: Home Care, Home Infusion; Other (see comment) ([employer] and [employer] resolving rental)        Advanced Care Plans/Directives on file:: Yes        Goals:   Goal 1 Statement: I will call my  doctor for signs and symptoms of infection.  Goal 1 Progression Percent: 50%  Goal 1 Progression Date: 06/13/17              Barriers: none known at this time.  Strengths: Spouse supportive of patient, patient receiving services from [facility].  Patient/Caregiver understanding: Patient and spouse will call provider if no response regarding signs and symptoms of infection at port site.  Frequency of Care Coordination: monthly  Upcoming appointment: 07/05/17 (pain)     Plan:   Patient will continue to follow treatment plan as directed and follow up with PCP with concerns ongoing.   Patient and spouse will call provider if no response regarding signs and symptoms of infection at port site.  RN CC will follow up with patient and spouse in 1 month or sooner if needed.    Melissa Behl BSN, RN, PHN  [facility] Care Coordinator  [phone]  [email or web address]

## 2017-06-12 NOTE — LETTER
[facility]  Complex Care Plan  About Me  Patient Name:  Parker Acevedo    YOB: 1970  Age:   46 year old   [facility] MRN: [id number] Telephone Information:     Home Phone [phone]   Mobile [phone]       Address:    [address]  [place] [address] Email address:  [email or web address]      Emergency Contact(s)  Name Relationship Lgl Grd Work Phone Home Phone Mobile Phone   1. NAHUM ACEVEDO* Spouse  none [phone]   2. MARELY ACEVEDO* Mother   [phone]           Primary language:  English     needed? No   [facility] [employer] [facility]:  [phone] op. 1  Other communication barriers: No  Preferred Method of Communication:  Phone  Current living arrangement: I live in a private home with spouse  Mobility Status/ Medical Equipment: Independent  Other information to know about me:    Health Maintenance  Health Maintenance Reviewed: Up to date    My Access Plan  Medical Emergency 911   Primary Clinic Line [facility] [phone]   24 Hour Appointment Line [phone] or  [phone]) (toll-free)   24 Hour Nurse Line [phone] (toll-free)   Preferred Urgent Care     [facility]erred [facility]  [phone]   Preferred Pharmacy [facility] [place] - [address]     Behavioral Health Crisis Line [facility], [phone] or 911     My Care Team Members  Patient Care Team       Relationship Specialty Notifications Start End    Esteban Daly MD PCP - General Family Practice  6/2/15     Comment:  [facility]    Phone: [phone] Fax: [phone]         [facility] [address] [address] [address]    Francis Vyas MD Referring Physician Surgery  4/9/15     Comment:  GI     Phone: [phone] Fax: [phone]          [facility] [address] [facility] [address]     Esteban Daly MD Referring Physician [facility]  4/9/15     Phone: [phone] Fax: [phone]         [facility] [address] [place] [address]    Esteban Daly MD   [facility]  6/2/15     Comment:  Merged    Phone: [phone] Fax: [phone]         [facility] [address] [facility] [address]    Bill Brumfield MD MD Gastroenterology  6/2/15     Phone: [phone] Fax: [phone]         [facility] [address] [facility] [address]    Patti Milligan MD MD Pain Clinic  6/2/15     Phone: [phone] Fax: [phone]         [facility] [address] [facility] [address] [facility] [address]    Chen Elkins, APRN CNP Nurse Practitioner Nurse Practitioner  11/24/15     Phone: [phone] Pager: [phone] Fax: [phone]        [facility] [address] [facility] [address]    Behl, Melissa K, RN Registered Nurse  Admissions 4/4/16     Comment:  Phone [phone]    Gabbi Thompson MD MD Infectious Diseases  10/31/16     Phone: [phone] Fax: [phone]         [facility] [address]  [facility] [address]         My Care Plans  Self Management and Treatment Plan  Goals and (Comments)  Goal #1: I will call my doctor for signs and symptoms of infection.   Action Plans on File: CHF, None  Advance Care Plans/Directives Type:   Type Advanced Care Plans/Directives: Advanced Directive - On File    My Medical and Care Information  Problem List   Patient Active Problem List   Diagnosis     Peptic ulcer disease     Gastric bypass status for obesity     CARDIOVASCULAR SCREENING; LDL GOAL LESS THAN 160     Chronic abdominal pain     Ulcer (H)     Vomiting     Anemia     ADHD (attention deficit hyperactivity disorder), inattentive type     Vitamin B12 deficiency without anemia     Thiamine deficiency     Dehydration     Dysphagia     Weight loss, non-intentional      Malnutrition (H)     Chronic anxiety     Constipation     Bile reflux esophagitis     Former smoker     Vitamin D deficiency     Iron deficiency     Health Care Home     Chronic pain     Coagulase negative staph bacteremia associated with intravascular line (H)     Insomnia     Positive blood culture     Fungemia     Chronic nausea     Gastrostomy tube in place (H)     Cervical radiculopathy     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)     Severe malnutrition (H)     Short gut syndrome     Anxiety     ADHD (attention deficit hyperactivity disorder)     Status post cervical spinal arthrodesis     Port or reservoir infection, initial encounter     Abnormal echocardiogram      Current Medications and Allergies:  See printed Medication Report.    Care Coordination Start Date: 04/25/16   Frequency of Care Coordination: monthly   Form Last Updated: 06/12/2017

## 2017-06-12 NOTE — LETTER
[facility]  [address] [place] [address]  Clinic: [phone]       June 12, 2017      Parker Acevedo  [address] [address] [address]    Dear Parker,  Here is an updated care plan for your records.      Sincerely,     Melissa Behl BSN, RN, PHN  [facility] Care Coordinator  [phone]  [email or web address]

## 2017-06-13 ENCOUNTER — MYC MEDICAL ADVICE (OUTPATIENT)
Dept: FAMILY MEDICINE | Facility: CLINIC | Age: 47
End: 2017-06-13

## 2017-06-13 ENCOUNTER — HOME INFUSION (PRE-WILLOW HOME INFUSION) (OUTPATIENT)
Dept: PHARMACY | Facility: CLINIC | Age: 47
End: 2017-06-13

## 2017-06-13 ENCOUNTER — MYC REFILL (OUTPATIENT)
Dept: FAMILY MEDICINE | Facility: CLINIC | Age: 47
End: 2017-06-13

## 2017-06-13 DIAGNOSIS — G89.18 PAIN FOLLOWING SURGERY OR PROCEDURE: ICD-10-CM

## 2017-06-13 LAB
AST SERPL W P-5'-P-CCNC: 14 U/L (ref 0–45)
POTASSIUM SERPL-SCNC: 3.6 MMOL/L (ref 3.4–5.3)

## 2017-06-13 PROCEDURE — 84132 ASSAY OF SERUM POTASSIUM: CPT | Performed by: SURGERY

## 2017-06-13 PROCEDURE — 84450 TRANSFERASE (AST) (SGOT): CPT | Performed by: SURGERY

## 2017-06-13 RX ORDER — LIDOCAINE/PRILOCAINE 2.5 %-2.5%
CREAM (GRAM) TOPICAL PRN
Qty: 30 G | Refills: 3 | Status: SHIPPED | OUTPATIENT
Start: 2017-06-13 | End: 2017-06-20

## 2017-06-13 NOTE — TELEPHONE ENCOUNTER
Reviewed the chart--not able to find the name of the cardiologist who he saw earlier at [facility]. Would call to provide a phone number for speciality care and the patient can call and work through scheduling.    Esteban Daly MD  Please close encounter when call/work completed.

## 2017-06-13 NOTE — TELEPHONE ENCOUNTER
Message from MyChart:  Original authorizing provider: MD Parker Camara would like a refill of the following medications:  morphine 0.1% in intrasite topical gel [Esteban Daly MD]    Preferred pharmacy: [place], [place] - [address] [place] [address]    Comment:

## 2017-06-14 ENCOUNTER — HOME INFUSION (PRE-WILLOW HOME INFUSION) (OUTPATIENT)
Dept: PHARMACY | Facility: CLINIC | Age: 47
End: 2017-06-14

## 2017-06-14 NOTE — PROGRESS NOTES
This is a snapshot of the patient's [facility] medical record. For complete information or questions call [phone] or In [facility],  Home Infusion ([id number]).  [facility] Number:  [id number]

## 2017-06-15 ENCOUNTER — HOME INFUSION (PRE-WILLOW HOME INFUSION) (OUTPATIENT)
Dept: PHARMACY | Facility: CLINIC | Age: 47
End: 2017-06-15

## 2017-06-15 ENCOUNTER — TRANSFERRED RECORDS (OUTPATIENT)
Dept: HEALTH INFORMATION MANAGEMENT | Facility: CLINIC | Age: 47
End: 2017-06-15

## 2017-06-16 ENCOUNTER — HOME INFUSION (PRE-WILLOW HOME INFUSION) (OUTPATIENT)
Dept: PHARMACY | Facility: CLINIC | Age: 47
End: 2017-06-16

## 2017-06-20 ENCOUNTER — HOME INFUSION (PRE-WILLOW HOME INFUSION) (OUTPATIENT)
Dept: PHARMACY | Facility: CLINIC | Age: 47
End: 2017-06-20

## 2017-06-20 ENCOUNTER — MYC REFILL (OUTPATIENT)
Dept: FAMILY MEDICINE | Facility: CLINIC | Age: 47
End: 2017-06-20

## 2017-06-20 ENCOUNTER — MYC REFILL (OUTPATIENT)
Dept: SURGERY | Facility: CLINIC | Age: 47
End: 2017-06-20

## 2017-06-20 DIAGNOSIS — Z98.84 BARIATRIC SURGERY STATUS: ICD-10-CM

## 2017-06-20 DIAGNOSIS — R11.0 NAUSEA: ICD-10-CM

## 2017-06-20 DIAGNOSIS — G89.18 PAIN FOLLOWING SURGERY OR PROCEDURE: ICD-10-CM

## 2017-06-20 PROCEDURE — 84425 ASSAY OF VITAMIN B-1: CPT | Performed by: SURGERY

## 2017-06-20 NOTE — PROGRESS NOTES
This is a snapshot of the patient's [facility] medical record. For complete information or questions call [phone] or In [facility],  Home Infusion ([id number]).  [facility] Number:  [id number]

## 2017-06-20 NOTE — PROGRESS NOTES
This is a snapshot of the patient's [facility] medical record. For complete information or questions call [phone] or In [facility],  Home Infusion ([id number]).  [facility] Number:  [id number]

## 2017-06-21 ENCOUNTER — HOME INFUSION (PRE-WILLOW HOME INFUSION) (OUTPATIENT)
Dept: PHARMACY | Facility: CLINIC | Age: 47
End: 2017-06-21

## 2017-06-21 RX ORDER — LIDOCAINE/PRILOCAINE 2.5 %-2.5%
CREAM (GRAM) TOPICAL PRN
Qty: 30 G | Refills: 11 | Status: SHIPPED | OUTPATIENT
Start: 2017-06-21 | End: 2017-08-01

## 2017-06-21 RX ORDER — CYANOCOBALAMIN 1000 UG/ML
1 INJECTION, SOLUTION INTRAMUSCULAR; SUBCUTANEOUS
Qty: 1 ML | Refills: 11 | Status: SHIPPED | OUTPATIENT
Start: 2017-06-21 | End: 2017-08-01

## 2017-06-21 RX ORDER — SUCRALFATE ORAL 1 G/10ML
1 SUSPENSION ORAL 4 TIMES DAILY
Qty: 1200 ML | Refills: 11 | Status: SHIPPED | OUTPATIENT
Start: 2017-06-21 | End: 2017-10-03

## 2017-06-21 NOTE — TELEPHONE ENCOUNTER
"Message from [employer]:  PoolLeah LPN Wed Jun 21, 2017 6:41 AM    Can you please refill.    Thank you  ----- Message -----   From: Parker Acevedo   Sent: 6/20/2017 6:22 PM   To: [facility]-  Subject: Medication Renewal Request     Original authorizing provider: Francis Vyas MD    Parker Acevedo would like a refill of the following medications:  Needle, Disp, (BD DISP NEEDLES) 27G X 1/2\" MISC [Francis Vyas MD]    [place] pharmacy: [facility] [place] [place] - [address] [place] [address]    Comment:  we will also be picking up oxycodone and fentynal patches on friday june 23 2017 at 8am if you have any questions feel free to call us at [phone]    Medication renewals requested in this message routed to other providers:  sucralfate (CARAFATE) 1 GM/10ML suspension [Esteban Daly MD]  cyanocobalamin (VITAMIN B12) 1000 MCG/ML injection [Esteban Daly MD]  lidocaine-prilocaine (EMLA) cream [Esteban Daly MD]    "

## 2017-06-21 NOTE — TELEPHONE ENCOUNTER
"Message from [employer]:  Original authorizing provider: Esteban Daly MD    Parker LALDaryl Acevedo would like a refill of the following medications:  sucralfate (CARAFATE) 1 GM/10ML suspension [Esteban Daly MD]  cyanocobalamin (VITAMIN B12) 1000 MCG/ML injection [Esteban Daly MD]  lidocaine-prilocaine (EMLA) cream [Esteban Daly MD]    Preferred pharmacy: [address]    Comment:  we will also be picking up oxycodone and fentynal patches on friday june 23 2017 at 8am if you have any questions feel free to call us at [phone]    Medication renewals requested in this message routed to other providers:  Needle, Disp, (BD DISP NEEDLES) 27G X 1/2\" MISC [Francis Vyas MD]  "

## 2017-06-22 ENCOUNTER — HOME INFUSION (PRE-WILLOW HOME INFUSION) (OUTPATIENT)
Dept: PHARMACY | Facility: CLINIC | Age: 47
End: 2017-06-22

## 2017-06-23 ENCOUNTER — HOME INFUSION (PRE-WILLOW HOME INFUSION) (OUTPATIENT)
Dept: PHARMACY | Facility: CLINIC | Age: 47
End: 2017-06-23

## 2017-06-23 LAB — VIT B1 BLD-MCNC: 152 UG/DL

## 2017-06-26 ENCOUNTER — HOME INFUSION (PRE-WILLOW HOME INFUSION) (OUTPATIENT)
Dept: PHARMACY | Facility: CLINIC | Age: 47
End: 2017-06-26

## 2017-06-26 ENCOUNTER — MYC MEDICAL ADVICE (OUTPATIENT)
Dept: FAMILY MEDICINE | Facility: CLINIC | Age: 47
End: 2017-06-26

## 2017-06-26 ENCOUNTER — MEDICAL CORRESPONDENCE (OUTPATIENT)
Dept: HEALTH INFORMATION MANAGEMENT | Facility: CLINIC | Age: 47
End: 2017-06-26

## 2017-06-26 DIAGNOSIS — R50.9 FEVER, UNSPECIFIED: Primary | ICD-10-CM

## 2017-06-26 PROCEDURE — 87800 DETECT AGNT MULT DNA DIREC: CPT | Performed by: SURGERY

## 2017-06-26 PROCEDURE — 87077 CULTURE AEROBIC IDENTIFY: CPT | Performed by: SURGERY

## 2017-06-26 PROCEDURE — 87186 SC STD MICRODIL/AGAR DIL: CPT | Performed by: SURGERY

## 2017-06-26 PROCEDURE — 87040 BLOOD CULTURE FOR BACTERIA: CPT | Performed by: SURGERY

## 2017-06-26 NOTE — TELEPHONE ENCOUNTER
Please contact home infusion to draw a blood culture.     Spoke with patient and let him know to proceed to the ED if any worsening or concerns.

## 2017-06-26 NOTE — TELEPHONE ENCOUNTER
Received [employer] message back form patient's wife:           just to let you know that he is refusing to go to the hospital and I don't know what else to do he has been sleeping alot and feeling really sick but thank you for everything      RN, please call to triage.

## 2017-06-26 NOTE — TELEPHONE ENCOUNTER
Routing to SF to review and advise in DA absence:  Blood is backing up in the PICC line, has had fevers (101.3F this morning) and not feeling well - weak and tired. Stated there is no blood backed up in the line at this time. The skin around the PICC line is dry and redness. After having a fever this morning decreased his hydration amount. Feels sick during feedings. Taking tylenol for fevers.  Patient is requesting to have blood work completed prior to going in to the ED. Patient declined going to the ED. Please review and advise. Melva Alfredo, RN, BSN

## 2017-06-26 NOTE — TELEPHONE ENCOUNTER
I have attempted to contact home infusion. I left a message for them to return my call. Please inform them to draw a blood culture per Dr. Zarco when call is returned. Louann Bojorquez CMA ([facility])

## 2017-06-27 ENCOUNTER — HOME INFUSION (PRE-WILLOW HOME INFUSION) (OUTPATIENT)
Dept: PHARMACY | Facility: CLINIC | Age: 47
End: 2017-06-27

## 2017-06-27 ENCOUNTER — TELEPHONE (OUTPATIENT)
Dept: FAMILY MEDICINE | Facility: CLINIC | Age: 47
End: 2017-06-27

## 2017-06-27 NOTE — TELEPHONE ENCOUNTER
Reason for call:  Form  Reason for Call:  Form, our goal is to have forms completed with 72 hours, however, some forms may require a visit or additional information.    Type of letter, form or note:  medical    Who is the form from?:  [facility]    Where did the form come from: form was faxed in    What clinic location was the form placed at?: [facility] - [phone]    Where the form was placed: 's Box    What number is listed as a contact on the form?:  [phone]       Additional comments:  Fax to [phone]    created by Balbina Lindajohn

## 2017-06-28 ENCOUNTER — CARE COORDINATION (OUTPATIENT)
Dept: SURGERY | Facility: CLINIC | Age: 47
End: 2017-06-28

## 2017-06-28 ENCOUNTER — HOSPITAL ENCOUNTER (INPATIENT)
Facility: CLINIC | Age: 47
LOS: 1 days | Discharge: HOME HEALTH | DRG: 314 | End: 2017-06-30
Attending: EMERGENCY MEDICINE | Admitting: PEDIATRICS
Payer: MEDICARE

## 2017-06-28 ENCOUNTER — HOME INFUSION (PRE-WILLOW HOME INFUSION) (OUTPATIENT)
Dept: PHARMACY | Facility: CLINIC | Age: 47
End: 2017-06-28

## 2017-06-28 ENCOUNTER — APPOINTMENT (OUTPATIENT)
Dept: GENERAL RADIOLOGY | Facility: CLINIC | Age: 47
DRG: 314 | End: 2017-06-28
Attending: EMERGENCY MEDICINE
Payer: MEDICARE

## 2017-06-28 DIAGNOSIS — T80.219S: Primary | ICD-10-CM

## 2017-06-28 DIAGNOSIS — R78.81 POSITIVE BLOOD CULTURE: ICD-10-CM

## 2017-06-28 DIAGNOSIS — Z93.1 GASTROSTOMY TUBE IN PLACE (H): ICD-10-CM

## 2017-06-28 DIAGNOSIS — R50.9 FEVER, UNSPECIFIED: ICD-10-CM

## 2017-06-28 DIAGNOSIS — E46 MALNUTRITION (H): ICD-10-CM

## 2017-06-28 LAB
ALBUMIN SERPL-MCNC: 3.2 G/DL (ref 3.4–5)
ALP SERPL-CCNC: 77 U/L (ref 40–150)
ALT SERPL W P-5'-P-CCNC: 21 U/L (ref 0–70)
ANION GAP SERPL CALCULATED.3IONS-SCNC: 4 MMOL/L (ref 3–14)
AST SERPL W P-5'-P-CCNC: 15 U/L (ref 0–45)
BASOPHILS # BLD AUTO: 0 10E9/L (ref 0–0.2)
BASOPHILS NFR BLD AUTO: 0.3 %
BILIRUB SERPL-MCNC: 0.3 MG/DL (ref 0.2–1.3)
BUN SERPL-MCNC: 10 MG/DL (ref 7–30)
CALCIUM SERPL-MCNC: 8.1 MG/DL (ref 8.5–10.1)
CHLORIDE SERPL-SCNC: 106 MMOL/L (ref 94–109)
CO2 SERPL-SCNC: 29 MMOL/L (ref 20–32)
CREAT SERPL-MCNC: 0.79 MG/DL (ref 0.66–1.25)
CRP SERPL-MCNC: 53 MG/L (ref 0–8)
DIFFERENTIAL METHOD BLD: ABNORMAL
EOSINOPHIL # BLD AUTO: 0.3 10E9/L (ref 0–0.7)
EOSINOPHIL NFR BLD AUTO: 4.9 %
ERYTHROCYTE [DISTWIDTH] IN BLOOD BY AUTOMATED COUNT: 14.6 % (ref 10–15)
ERYTHROCYTE [SEDIMENTATION RATE] IN BLOOD BY WESTERGREN METHOD: 26 MM/H (ref 0–15)
GFR SERPL CREATININE-BSD FRML MDRD: ABNORMAL ML/MIN/1.7M2
GLUCOSE SERPL-MCNC: 88 MG/DL (ref 70–99)
HCT VFR BLD AUTO: 33 % (ref 40–53)
HGB BLD-MCNC: 10.8 G/DL (ref 13.3–17.7)
IMM GRANULOCYTES # BLD: 0 10E9/L (ref 0–0.4)
IMM GRANULOCYTES NFR BLD: 0.2 %
LACTATE BLD-SCNC: 0.4 MMOL/L (ref 0.7–2.1)
LIPASE SERPL-CCNC: 125 U/L (ref 73–393)
LYMPHOCYTES # BLD AUTO: 1.8 10E9/L (ref 0.8–5.3)
LYMPHOCYTES NFR BLD AUTO: 31 %
MCH RBC QN AUTO: 29.8 PG (ref 26.5–33)
MCHC RBC AUTO-ENTMCNC: 32.7 G/DL (ref 31.5–36.5)
MCV RBC AUTO: 91 FL (ref 78–100)
MONOCYTES # BLD AUTO: 0.7 10E9/L (ref 0–1.3)
MONOCYTES NFR BLD AUTO: 12.8 %
NEUTROPHILS # BLD AUTO: 2.9 10E9/L (ref 1.6–8.3)
NEUTROPHILS NFR BLD AUTO: 50.8 %
NRBC # BLD AUTO: 0 10*3/UL
NRBC BLD AUTO-RTO: 0 /100
PLATELET # BLD AUTO: 136 10E9/L (ref 150–450)
POTASSIUM SERPL-SCNC: 3.6 MMOL/L (ref 3.4–5.3)
PROT SERPL-MCNC: 6.6 G/DL (ref 6.8–8.8)
RBC # BLD AUTO: 3.63 10E12/L (ref 4.4–5.9)
SODIUM SERPL-SCNC: 138 MMOL/L (ref 133–144)
WBC # BLD AUTO: 5.8 10E9/L (ref 4–11)

## 2017-06-28 PROCEDURE — 81001 URINALYSIS AUTO W/SCOPE: CPT | Performed by: EMERGENCY MEDICINE

## 2017-06-28 PROCEDURE — 36415 COLL VENOUS BLD VENIPUNCTURE: CPT | Performed by: EMERGENCY MEDICINE

## 2017-06-28 PROCEDURE — 85652 RBC SED RATE AUTOMATED: CPT | Performed by: EMERGENCY MEDICINE

## 2017-06-28 PROCEDURE — 3E0436Z INTRODUCTION OF NUTRITIONAL SUBSTANCE INTO CENTRAL VEIN, PERCUTANEOUS APPROACH: ICD-10-PCS | Performed by: INTERNAL MEDICINE

## 2017-06-28 PROCEDURE — 25000132 ZZH RX MED GY IP 250 OP 250 PS 637: Performed by: EMERGENCY MEDICINE

## 2017-06-28 PROCEDURE — 99285 EMERGENCY DEPT VISIT HI MDM: CPT | Mod: Z6 | Performed by: EMERGENCY MEDICINE

## 2017-06-28 PROCEDURE — 86140 C-REACTIVE PROTEIN: CPT | Performed by: EMERGENCY MEDICINE

## 2017-06-28 PROCEDURE — 85025 COMPLETE CBC W/AUTO DIFF WBC: CPT | Performed by: EMERGENCY MEDICINE

## 2017-06-28 PROCEDURE — 83690 ASSAY OF LIPASE: CPT | Performed by: EMERGENCY MEDICINE

## 2017-06-28 PROCEDURE — 87040 BLOOD CULTURE FOR BACTERIA: CPT | Performed by: EMERGENCY MEDICINE

## 2017-06-28 PROCEDURE — 71020 XR CHEST 2 VW: CPT

## 2017-06-28 PROCEDURE — 80053 COMPREHEN METABOLIC PANEL: CPT | Performed by: EMERGENCY MEDICINE

## 2017-06-28 PROCEDURE — 25000128 H RX IP 250 OP 636: Performed by: EMERGENCY MEDICINE

## 2017-06-28 PROCEDURE — 96365 THER/PROPH/DIAG IV INF INIT: CPT | Performed by: EMERGENCY MEDICINE

## 2017-06-28 PROCEDURE — 99285 EMERGENCY DEPT VISIT HI MDM: CPT | Mod: 25 | Performed by: EMERGENCY MEDICINE

## 2017-06-28 PROCEDURE — 96361 HYDRATE IV INFUSION ADD-ON: CPT | Performed by: EMERGENCY MEDICINE

## 2017-06-28 PROCEDURE — 83605 ASSAY OF LACTIC ACID: CPT | Performed by: EMERGENCY MEDICINE

## 2017-06-28 PROCEDURE — 96375 TX/PRO/DX INJ NEW DRUG ADDON: CPT | Performed by: EMERGENCY MEDICINE

## 2017-06-28 RX ORDER — DIPHENHYDRAMINE HYDROCHLORIDE 50 MG/ML
25 INJECTION INTRAMUSCULAR; INTRAVENOUS ONCE
Status: COMPLETED | OUTPATIENT
Start: 2017-06-28 | End: 2017-06-28

## 2017-06-28 RX ORDER — ONDANSETRON 2 MG/ML
4 INJECTION INTRAMUSCULAR; INTRAVENOUS ONCE
Status: COMPLETED | OUTPATIENT
Start: 2017-06-28 | End: 2017-06-28

## 2017-06-28 RX ORDER — OXYCODONE HCL 5 MG/5 ML
15 SOLUTION, ORAL ORAL ONCE
Status: COMPLETED | OUTPATIENT
Start: 2017-06-28 | End: 2017-06-28

## 2017-06-28 RX ADMIN — SODIUM CHLORIDE 1000 ML: 9 INJECTION, SOLUTION INTRAVENOUS at 22:27

## 2017-06-28 RX ADMIN — OXYCODONE HYDROCHLORIDE 15 MG: 5 SOLUTION ORAL at 23:44

## 2017-06-28 RX ADMIN — VANCOMYCIN HYDROCHLORIDE 1500 MG: 10 INJECTION, POWDER, LYOPHILIZED, FOR SOLUTION INTRAVENOUS at 23:38

## 2017-06-28 RX ADMIN — DIPHENHYDRAMINE HYDROCHLORIDE 25 MG: 50 INJECTION, SOLUTION INTRAMUSCULAR; INTRAVENOUS at 23:36

## 2017-06-28 RX ADMIN — ONDANSETRON 4 MG: 2 INJECTION INTRAMUSCULAR; INTRAVENOUS at 23:46

## 2017-06-28 ASSESSMENT — ENCOUNTER SYMPTOMS
BLOOD IN STOOL: 0
WHEEZING: 1
DIARRHEA: 0
FEVER: 1
NAUSEA: 1
COUGH: 1
CONSTIPATION: 0
BACK PAIN: 1
DYSURIA: 0
VOMITING: 1
ANAL BLEEDING: 0
HEMATURIA: 0
ABDOMINAL PAIN: 1

## 2017-06-28 NOTE — IP AVS SNAPSHOT
[address] [address]    Phone:  [phone]                                       After Visit Summary   6/28/2017    Parker Acevedo Sr    MRN: [id number]           After Visit Summary Signature Page     I have received my discharge instructions, and my questions have been answered. I have discussed any challenges I see with this plan with the nurse or doctor.    ..........................................................................................................................................  Patient/Patient Representative Signature      ..........................................................................................................................................  Patient Representative Print Name and Relationship to Patient    ..................................................               ................................................  Date                                            Time    ..........................................................................................................................................  Reviewed by Signature/Title    ...................................................              ..............................................  Date                                                            Time

## 2017-06-28 NOTE — PROGRESS NOTES
Called patient per Dr. Vyas. Advised that his blood culture came back positive for infection. He needs to have his port removed via IR per Dr. Vyas. [employer] message also sent to patient.     [facility] called and stated the draw was from PICC and peripheral draw,  not port in chest.  They are asking if ok to pull the PICC line. If  Infusion is still needed ok to continue infusing with the chest port as long as this is ok with ID and there are no issues. Called and spoke to Eliza in [facility].    Relayed the above information. They will discuss with patient. Per Dr. Vyas patient does not need to be seen until next year for yearly follow up.

## 2017-06-28 NOTE — IP AVS SNAPSHOT
MRN:[id number]                      After Visit Summary   6/28/2017    Parker Acevedo Sr    MRN: [id number]           Thank you!     Thank you for choosing [facility] for your care. Our goal is always to provide you with excellent care. Hearing back from our patients is one way we can continue to improve our services. Please take a few minutes to complete the written survey that you may receive in the mail after you visit with us. Thank you!        Patient Information     Date Of Birth          1970        Designated Caregiver       Most Recent Value    Caregiver    Will someone help with your care after discharge? yes    Name of designated caregiver Rose    Phone number of caregiver [phone]    Caregiver address [address] [address]      About your hospital stay     You were admitted on:  June 29, 2017 You last received care in the:  [address] [facility]    You were discharged on:  June 30, 2017        Reason for your hospital stay       You were admitted for blood culture from your PICC growing bacteria. This was likely colonization and it was removed. Cultures from your port remained negative and it should be ok to use at this time for TPN.                  Who to Call     For medical emergencies, please call 911.  For non-urgent questions about your medical care, please call your primary care provider or clinic, [phone]          Attending Provider     Provider Specialty    Ramona Oviedo MD Emergency Medicine    [facility], Francis Keen MD Internal Medicine    Juanjose Anderson MD Internal Medicine       Primary Care Provider Office Phone # Fax #    Esteban Daly [phone]       When to contact your care team       Call your primary doctor if you have any of the following: fevers, chills, N/V.                  After Care Instructions     Activity       Your activity upon discharge: activity as tolerated            Diet       Follow this diet upon  discharge: Orders Placed This Encounter      Regular Diet Adult            Discharge Instructions       1. Ok to use port for TPN  2. Follow up with PCP in 1 week due to hospitalization                  Follow-up Appointments     Adult [facility] Follow-up and recommended labs and tests       Follow up with primary care provider, Esteban Daly, within 7 days for hospital follow- up.  The following labs/tests are recommended: BMP and CBC.    Appointments on [place] and/or [facility] (with [facility] or [facility] provider or service). Call [phone] if you haven't heard regarding these appointments within 7 days of discharge.                  Your next 10 appointments already scheduled     Jul 03, 2017 11:00 AM CDT   [facility] Visit with Eduardo [facility]nard MD   [facility]    [address] [place] [id number] [phone]            Jul 05, 2017  2:30 PM CDT   Return Visit with Patti Milligan MD   [facility] ([facility])    [address] [place] [id number] [phone]            Jul 06, 2017 11:20 AM CDT   Office Visit with Esteban Daly MD   [facility] ([facility])    [address]  [facility] [id number] [phone]           Bring a current list of meds and any records pertaining to this visit.  For Physicals, please bring immunization records and any forms needing to be filled out.  Please arrive 10 minutes early to complete paperwork.            Aug 01, 2017 11:00 AM CDT   Office Visit with Esteban Daly MD   [facility] ([facility])    [address], [address]  [facility] [id number] [phone]           Bring a current list of meds and any records pertaining to this visit.  For Physicals, please bring immunization records and any forms needing to be filled out.  Please arrive 10 minutes early to  "complete paperwork.              Additional Services     Home infusion referral       Your provider has referred you to: [facility]: [facility] [phone]   [email or web address]    Local Address (if different from home address): N/A    Anticipated Length of Therapy: per md order    Home Infusion Pharmacist to adjust therapy based on labs and clinical assessments: Yes    Labs:  May draw labs from Venous Catheter: Yes  Home Infusion Pharmacist to order labs based on therapy type and clinical assessments: Yes  Call/Fax Lab Results to: pcp    Agency Staff to assess nursing needs for Infusion Therapy.    Access Device Management:  IV Access Type: Port-a-Cath  Flush with Heparin and Normal Saline IVP PRN and routine site care (per agency protocol) to maintain access device? Yes    TPN and supplies. Please resume previous prescription                  Pending Results     Date and Time Order Name Status Description    6/29/2017 1043 EKG 12-lead, tracing only Preliminary     6/28/2017 2154 Blood culture Preliminary     6/28/2017 2154 Blood culture Preliminary             Statement of Approval     Ordered          06/30/17 0956  I have reviewed and agree with all the recommendations and orders detailed in this document.  EFFECTIVE NOW     Approved and electronically signed by:  Juanjose Anderson MD             Admission Information     Date & Time Provider Department Dept. Phone    6/28/2017 Juanjose Anderson MD [address] [facility] [phone]      Your Vitals Were     Blood Pressure Pulse Temperature Respirations Height Weight    114/77 (BP Location: Left arm) 80 98  F (36.7  C) (Oral) 16 1.803 m (5' 11\") 91.9 kg (202 lb 8 oz)    Pulse Oximetry BMI (Body Mass Index)                98% 28.24 kg/m2          [employer] Information     [employer] gives you secure access to your electronic health record. If you see a primary care provider, you can also send messages to your care team " and make appointments. If you have questions, please call your primary care clinic.  If you do not have a primary care provider, please call [phone] and they will assist you.        Care EveryWhere ID     This is your Care EveryWhere ID. This could be used by other organizations to access your [facility] medical records  [id number]        Equal Access to Services     KATHRYN GUZMAN : Hadii kameron huff hadmeaghano Sooswaldali, waaxda luqadaha, qaybta kaalmada dayami, carmela dumont . So [facility] [phone].    ATENCIÓN: Si habla español, tiene a hicks disposición servicios gratuitos de asistencia lingüística. Chu al [phone].    We comply with applicable federal civil rights laws and [place] laws. We do not discriminate on the basis of race, color, national origin, age, disability sex, sexual orientation or gender identity.               Review of your medicines      CONTINUE these medicines which have NOT CHANGED        Dose / Directions    albuterol 108 (90 BASE) MCG/ACT Inhaler   Commonly known as:  PROAIR HFA/PROVENTIL HFA/VENTOLIN HFA   Used for:  Aspiration pneumonitis (H)        Dose:  2 puff   Inhale 2 puffs into the lungs every 4 hours as needed for shortness of breath / dyspnea or wheezing   Quantity:  1 Inhaler   Refills:  3       * amphetamine-dextroamphetamine 20 MG per tablet   Commonly known as:  ADDERALL   Used for:  ADHD (attention deficit hyperactivity disorder), inattentive type        Dose:  20 mg   Take 1 tablet (20 mg) by mouth 2 times daily   Quantity:  60 tablet   Refills:  0       * amphetamine-dextroamphetamine 20 MG per tablet   Commonly known as:  ADDERALL   Used for:  ADHD (attention deficit hyperactivity disorder), inattentive type        Dose:  20 mg   Take 1 tablet (20 mg) by mouth 2 times daily   Quantity:  60 tablet   Refills:  0       * amphetamine-dextroamphetamine 20 MG per tablet   Commonly known as:  ADDERALL   Used for:  ADHD (attention deficit  "hyperactivity disorder), inattentive type        Dose:  20 mg   Take 1 tablet (20 mg) by mouth 2 times daily   Quantity:  60 tablet   Refills:  0       * amphetamine-dextroamphetamine 20 MG per tablet   Commonly known as:  ADDERALL   Used for:  ADHD (attention deficit hyperactivity disorder), inattentive type        Dose:  20 mg   Start taking on:  7/3/2017   Take 1 tablet (20 mg) by mouth 2 times daily   Quantity:  60 tablet   Refills:  0       cyanocobalamin 1000 MCG/ML injection   Commonly known as:  VITAMIN B12   Used for:  Bariatric surgery status        Dose:  1 mL   Inject 1 mL (1,000 mcg) into the muscle every 30 days   Quantity:  1 mL   Refills:  11       diazepam 5 MG tablet   Commonly known as:  VALIUM   Used for:  Chronic anxiety        Dose:  5 mg   Take 1 tablet (5 mg) by mouth daily as needed for anxiety or sleep   Quantity:  30 tablet   Refills:  2       * [facility]ED PATIENT   Used for:  S/P bariatric surgery        Contact [facility] for patient specific medication information at [phone] on admission and discharge from the hospital.  Phones are answered 24 hours a day 7 days a week 365 days a year.  Providers - Choose \"CONTINUE HOME MED (no script)\" at discharge if patient treatment with home infusion will continue.   Refills:  0       * [facility]ED PATIENT   Used for:  Severe malnutrition (H)        Contact [facility] for patient specific medication information at [phone] on admission and discharge from the hospital.  Phones are answered 24 hours a day 7 days a week 365 days a year.  Providers - Choose \"CONTINUE HOME MED (no script)\" at discharge if patient treatment with home infusion will continue.   Refills:  0       fentaNYL 50 mcg/hr 72 hr patch   Commonly known as:  DURAGESIC   Used for:  Chronic abdominal pain        Dose:  1 patch   Place 1 patch onto the skin every 48 hours MYLAN BRAND ONLY. Fill on/after 6/23/17 " "to start on/after 6/25/17   Quantity:  15 patch   Refills:  0       lidocaine-prilocaine cream   Commonly known as:  EMLA   Used for:  Pain following surgery or procedure        Apply topically as needed for moderate pain   Quantity:  30 g   Refills:  11       morphine 0.1% in intrasite topical gel   Used for:  Pain following surgery or procedure        Apply as needed prior to accessing the port site.   Quantity:  100 g   Refills:  0       mupirocin 2 % ointment   Commonly known as:  BACTROBAN   Used for:  Skin infection        Apply topically 3 times daily   Quantity:  30 g   Refills:  0       Needle (Disp) 27G X 1/2\" Misc   Commonly known as:  BD DISP NEEDLES   Used for:  Bariatric surgery status        Dose:  1 Device   1 Device every 30 days Use for cyanocobalamin injection once q 30 days.   Quantity:  3 each   Refills:  4       nystatin-triamcinolone cream   Commonly known as:  MYCOLOG II   Used for:  Skin irritation        Apply topically 2 times daily as needed   Quantity:  60 g   Refills:  5       ondansetron 8 MG ODT tab   Commonly known as:  ZOFRAN-ODT   Used for:  Nausea        Dose:  8 mg   Take 1 tablet (8 mg) by mouth every 8 hours as needed for nausea   Quantity:  90 tablet   Refills:  3       * order for DME   Used for:  Bariatric surgery status        Injection Supplies for Vitamin B12: 3cc syringes w/ 27 gauge needles, 1/2 inch length   Quantity:  12 each   Refills:  0       * order for DME   Used for:  Bilateral edema of lower extremity        Equipment being ordered: Bilateral knee high chronic venous insufficiency stockings--  mild-moderate pressures.   Quantity:  4 each   Refills:  5       oxyCODONE 5 MG/5ML solution   Commonly known as:  ROXICODONE   Used for:  Encounter for long-term opiate analgesic use        Dose:  10-15 mg   Take 10-15 mLs (10-15 mg) by mouth every 4 hours as needed for moderate to severe pain Max of 45mg per day. Fill on/after 6/23/17 not to start till 6/24/17. "   Quantity:  1350 mL   Refills:  0       senna-docusate 8.6-50 MG per tablet   Commonly known as:  SENOKOT-S;PERICOLACE   Used for:  Slow transit constipation        Dose:  1-2 tablet   Take 1-2 tablets by mouth 2 times daily as needed for constipation   Quantity:  120 tablet   Refills:  11       sucralfate 1 GM/10ML suspension   Commonly known as:  CARAFATE   Used for:  Nausea        Dose:  1 g   Take 10 mLs (1 g) by mouth 4 times daily   Quantity:  1200 mL   Refills:  11       vitamin D 18695 UNIT capsule   Commonly known as:  ERGOCALCIFEROL   Used for:  Vitamin D deficiency        Dose:  80002 Units   Take 1 capsule (50,000 Units) by mouth every 7 days   Quantity:  12 capsule   Refills:  3       * Notice:  This list has 8 medication(s) that are the same as other medications prescribed for you. Read the directions carefully, and ask your doctor or other care provider to review them with you.             Protect others around you: Learn how to safely use, store and throw away your medicines at www.disposemymeds.org.             Medication List: This is a list of all your medications and when to take them. Check marks below indicate your daily home schedule. Keep this list as a reference.      Medications           Morning Afternoon Evening Bedtime As Needed    albuterol 108 (90 BASE) MCG/ACT Inhaler   Commonly known as:  PROAIR HFA/PROVENTIL HFA/VENTOLIN HFA   Inhale 2 puffs into the lungs every 4 hours as needed for shortness of breath / dyspnea or wheezing                                * amphetamine-dextroamphetamine 20 MG per tablet   Commonly known as:  ADDERALL   Take 1 tablet (20 mg) by mouth 2 times daily                                * amphetamine-dextroamphetamine 20 MG per tablet   Commonly known as:  ADDERALL   Take 1 tablet (20 mg) by mouth 2 times daily                                * amphetamine-dextroamphetamine 20 MG per tablet   Commonly known as:  ADDERALL   Take 1 tablet (20 mg) by mouth 2  "times daily                                * amphetamine-dextroamphetamine 20 MG per tablet   Commonly known as:  ADDERALL   Take 1 tablet (20 mg) by mouth 2 times daily   Start taking on:  7/3/2017                                cyanocobalamin 1000 MCG/ML injection   Commonly known as:  VITAMIN B12   Inject 1 mL (1,000 mcg) into the muscle every 30 days                                diazepam 5 MG tablet   Commonly known as:  VALIUM   Take 1 tablet (5 mg) by mouth daily as needed for anxiety or sleep   Last time this was given:  5 mg on 6/30/2017 12:08 AM                                * [facility]ED PATIENT   Contact [facility] for patient specific medication information at [phone] on admission and discharge from the hospital.  Phones are answered 24 hours a day 7 days a week 365 days a year.  Providers - Choose \"CONTINUE HOME MED (no script)\" at discharge if patient treatment with home infusion will continue.                                * [facility] MANAGED PATIENT   Contact [facility] for patient specific medication information at [phone] on admission and discharge from the hospital.  Phones are answered 24 hours a day 7 days a week 365 days a year.  Providers - Choose \"CONTINUE HOME MED (no script)\" at discharge if patient treatment with home infusion will continue.                                fentaNYL 50 mcg/hr 72 hr patch   Commonly known as:  DURAGESIC   Place 1 patch onto the skin every 48 hours MYLAN BRAND ONLY. Fill on/after 6/23/17 to start on/after 6/25/17   Last time this was given:  1 patch on 6/29/2017  5:20 AM                                lidocaine-prilocaine cream   Commonly known as:  EMLA   Apply topically as needed for moderate pain                                morphine 0.1% in intrasite topical gel   Apply as needed prior to accessing the port site.                                mupirocin 2 % ointment   Commonly " "known as:  BACTROBAN   Apply topically 3 times daily   Last time this was given:  6/30/2017  8:31 AM                                Needle (Disp) 27G X 1/2\" Misc   Commonly known as:  BD DISP NEEDLES   1 Device every 30 days Use for cyanocobalamin injection once q 30 days.                                nystatin-triamcinolone cream   Commonly known as:  MYCOLOG II   Apply topically 2 times daily as needed                                ondansetron 8 MG ODT tab   Commonly known as:  ZOFRAN-ODT   Take 1 tablet (8 mg) by mouth every 8 hours as needed for nausea   Last time this was given:  4 mg on 6/30/2017  2:41 AM                                * order for DME   Injection Supplies for Vitamin B12: 3cc syringes w/ 27 gauge needles, 1/2 inch length                                * order for DME   Equipment being ordered: Bilateral knee high chronic venous insufficiency stockings--  mild-moderate pressures.                                oxyCODONE 5 MG/5ML solution   Commonly known as:  ROXICODONE   Take 10-15 mLs (10-15 mg) by mouth every 4 hours as needed for moderate to severe pain Max of 45mg per day. Fill on/after 6/23/17 not to start till 6/24/17.   Last time this was given:  15 mg on 6/30/2017  9:18 AM                                senna-docusate 8.6-50 MG per tablet   Commonly known as:  SENOKOT-S;PERICOLACE   Take 1-2 tablets by mouth 2 times daily as needed for constipation                                sucralfate 1 GM/10ML suspension   Commonly known as:  CARAFATE   Take 10 mLs (1 g) by mouth 4 times daily   Last time this was given:  1 g on 6/30/2017  8:31 AM                                vitamin D 05454 UNIT capsule   Commonly known as:  ERGOCALCIFEROL   Take 1 capsule (50,000 Units) by mouth every 7 days                                * Notice:  This list has 8 medication(s) that are the same as other medications prescribed for you. Read the directions carefully, and ask your doctor or other care " provider to review them with you.

## 2017-06-28 NOTE — IP AVS SNAPSHOT
"    [facility] [address] [facility]: [phone]                                              INTERAGENCY TRANSFER FORM - PHYSICIAN ORDERS   2017                    Hospital Admission Date: 2017  SARA HERRERAGLORY HENRIQUEZ   : 1970  Sex: Male        Attending Provider: Juanjose Anderson MD     Allergies:  Bactrim [Sulfamethoxazole W/trimethoprim], Penicillins, Doxycycline, Vancomycin    Infection:  None   Service:  INTERNAL MED    Ht:  1.803 m (5' 11\")   Wt:  91.9 kg (202 lb 8 oz)   Admission Wt:  78.9 kg (174 lb)    BMI:  28.24 kg/m 2   BSA:  2.15 m 2            Patient PCP Information     Provider PCP Type    Esteban Daly MD General      ED Clinical Impression     Diagnosis Description Comment Added By Time Added    Positive blood culture [R78.81] Positive blood culture [R78.81]  Ramona Oviedo MD 2017 11:11 PM    Fever, unspecified [R50.9] Fever, unspecified [R50.9]  Ramona Oviedo MD 2017 11:11 PM      Hospital Problems as of 2017              Priority Class Noted POA    Bacteremia Medium  2017 Yes      Non-Hospital Problems as of 2017              Priority Class Noted    Peptic ulcer disease   2010    Gastric bypass status for obesity   2010    CARDIOVASCULAR SCREENING; LDL GOAL LESS THAN 160   10/31/2010    Chronic abdominal pain   2012    Ulcer (H)   2012    Vomiting   2012    Anemia   2012    ADHD (attention deficit hyperactivity disorder), inattentive type   2013    Vitamin B12 deficiency without anemia   2013    Thiamine deficiency   2013    Dehydration Medium  2013    Dysphagia Medium  2013    Weight loss, non-intentional Medium  2013    Malnutrition (H) Medium  2013    Chronic anxiety   2013    Constipation   10/1/2013    Bile reflux esophagitis   10/16/2013    Former smoker Medium  2014    Vitamin D deficiency   2014    Iron deficiency Medium  2014    Health Care Home   " 2/24/2015    Chronic pain Medium  7/7/2015    Coagulase negative staph bacteremia associated with intravascular line (H) Medium  7/30/2015    Insomnia Medium  8/13/2015    Positive blood culture Medium  3/29/2016    Fungemia Medium  4/11/2016    Chronic nausea Medium  5/10/2016    Gastrostomy tube in place (H) Medium  8/9/2016    Cervical radiculopathy Medium  1/17/2017    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) Medium  Unknown    Severe malnutrition (H) Medium  Unknown    Short gut syndrome Medium  Unknown    Anxiety Medium  Unknown    ADHD (attention deficit hyperactivity disorder) Medium  Unknown    Status post cervical spinal arthrodesis Medium  2/15/2017    Port or reservoir infection, initial encounter Medium  3/16/2017    Abnormal echocardiogram Medium  4/11/2017      Code Status History     Date Active Date Inactive Code Status Order ID Comments User Context    2/15/2017  4:40 PM 2/16/2017  3:20 PM Full Code [id number]  Fernandez Mann PA-C Inpatient    2/15/2017  2:43 PM 2/15/2017  4:40 PM Full Code [id number]  Fernandez Mann PA-C Outpatient    10/28/2016 11:13 AM 2/15/2017  2:43 PM Full Code [id number]  Marko Betancourt MD Outpatient    10/25/2016  7:37 PM 10/28/2016 11:13 AM Full Code During Procedure [id number]  Jefferson See MD Inpatient    4/15/2016  1:36 PM 10/25/2016  7:37 PM Full Code [id number]  Blas Edwards MD Outpatient    4/11/2016  8:54 PM 4/15/2016  1:36 PM Full Code [id number]  Leana Jiménez PA Inpatient    4/1/2016 10:33 AM 4/11/2016  8:54 PM Full Code [id number]  Annel Biswas PA-C Outpatient    3/29/2016  3:02 AM 4/1/2016 10:33 AM Full Code [id number]  Shannon Puga MD Inpatient    1/17/2016  1:38 PM 3/29/2016  3:02 AM Full Code [id number]  Ilsa Shine PA Outpatient    1/16/2016  4:34 AM 1/17/2016  1:38 PM Full Code [id number]  Tino Brown MD [facility]    8/7/2015  3:52 PM 1/16/2016  4:34 AM Full Code [id number]  Gabriel,  MD Jamil Outpatient    8/5/2015  6:39 AM 8/7/2015  3:52 PM Full Code [id number]  Eve Monzon MD Inpatient    8/5/2015  4:50 AM 8/5/2015  6:39 AM Full Code [id number]  Eve Monzon MD Inpatient    7/30/2015  1:24 AM 8/4/2015  4:32 PM Full Code [id number]  Eve Monzon MD Inpatient    11/22/2013  8:38 PM 11/27/2013 12:57 PM Full Code [id number]  Nicolette Barrios RN Inpatient    7/15/2012  6:24 AM 7/19/2012  3:16 PM Full Code [id number]  Elaine Kinney MD Inpatient    6/28/2012 12:13 PM 7/9/2012 12:54 PM Full Code [id number]  Elaine Kinney MD ED    6/22/2012  8:30 PM 6/27/2012  3:38 PM Full Code [id number]  Jacki Summers RN Inpatient         Medication Review      CONTINUE these medications which have NOT CHANGED        Dose / Directions Comments    albuterol 108 (90 BASE) MCG/ACT Inhaler   Commonly known as:  PROAIR HFA/PROVENTIL HFA/VENTOLIN HFA   Used for:  Aspiration pneumonitis (H)        Dose:  2 puff   Inhale 2 puffs into the lungs every 4 hours as needed for shortness of breath / dyspnea or wheezing   Quantity:  1 Inhaler   Refills:  3        * amphetamine-dextroamphetamine 20 MG per tablet   Commonly known as:  ADDERALL   Used for:  ADHD (attention deficit hyperactivity disorder), inattentive type        Dose:  20 mg   Take 1 tablet (20 mg) by mouth 2 times daily   Quantity:  60 tablet   Refills:  0        * amphetamine-dextroamphetamine 20 MG per tablet   Commonly known as:  ADDERALL   Used for:  ADHD (attention deficit hyperactivity disorder), inattentive type        Dose:  20 mg   Take 1 tablet (20 mg) by mouth 2 times daily   Quantity:  60 tablet   Refills:  0        * amphetamine-dextroamphetamine 20 MG per tablet   Commonly known as:  ADDERALL   Used for:  ADHD (attention deficit hyperactivity disorder), inattentive type        Dose:  20 mg   Take 1 tablet (20 mg) by mouth 2 times daily   Quantity:  60 tablet   Refills:  0        * amphetamine-dextroamphetamine 20 MG per tablet  "  Commonly known as:  ADDERALL   Used for:  ADHD (attention deficit hyperactivity disorder), inattentive type        Dose:  20 mg   Start taking on:  7/3/2017   Take 1 tablet (20 mg) by mouth 2 times daily   Quantity:  60 tablet   Refills:  0        cyanocobalamin 1000 MCG/ML injection   Commonly known as:  VITAMIN B12   Used for:  Bariatric surgery status        Dose:  1 mL   Inject 1 mL (1,000 mcg) into the muscle every 30 days   Quantity:  1 mL   Refills:  11        diazepam 5 MG tablet   Commonly known as:  VALIUM   Used for:  Chronic anxiety        Dose:  5 mg   Take 1 tablet (5 mg) by mouth daily as needed for anxiety or sleep   Quantity:  30 tablet   Refills:  2        * [facility]   Used for:  S/P bariatric surgery        Contact [facility] for patient specific medication information at [phone] on admission and discharge from the hospital.  Phones are answered 24 hours a day 7 days a week 365 days a year.  Providers - Choose \"CONTINUE HOME MED (no script)\" at discharge if patient treatment with home infusion will continue.   Refills:  0    Resume prior to admission TPN/Lipids/saline       * [facility]   Used for:  Severe malnutrition (H)        Contact [facility] for patient specific medication information at [phone] on admission and discharge from the hospital.  Phones are answered 24 hours a day 7 days a week 365 days a year.  Providers - Choose \"CONTINUE HOME MED (no script)\" at discharge if patient treatment with home infusion will continue.   Refills:  0    Continue home infusion       fentaNYL 50 mcg/hr 72 hr patch   Commonly known as:  DURAGESIC   Used for:  Chronic abdominal pain        Dose:  1 patch   Place 1 patch onto the skin every 48 hours MYLAN BRAND ONLY. Fill on/after 6/23/17 to start on/after 6/25/17   Quantity:  15 patch   Refills:  0        lidocaine-prilocaine cream   Commonly known as:  EMLA " "  Used for:  Pain following surgery or procedure        Apply topically as needed for moderate pain   Quantity:  30 g   Refills:  11        morphine 0.1% in intrasite topical gel   Used for:  Pain following surgery or procedure        Apply as needed prior to accessing the port site.   Quantity:  100 g   Refills:  0        mupirocin 2 % ointment   Commonly known as:  BACTROBAN   Used for:  Skin infection        Apply topically 3 times daily   Quantity:  30 g   Refills:  0        Needle (Disp) 27G X 1/2\" Misc   Commonly known as:  BD DISP NEEDLES   Used for:  Bariatric surgery status        Dose:  1 Device   1 Device every 30 days Use for cyanocobalamin injection once q 30 days.   Quantity:  3 each   Refills:  4        nystatin-triamcinolone cream   Commonly known as:  MYCOLOG II   Used for:  Skin irritation        Apply topically 2 times daily as needed   Quantity:  60 g   Refills:  5        ondansetron 8 MG ODT tab   Commonly known as:  ZOFRAN-ODT   Used for:  Nausea        Dose:  8 mg   Take 1 tablet (8 mg) by mouth every 8 hours as needed for nausea   Quantity:  90 tablet   Refills:  3        * order for DME   Used for:  Bariatric surgery status        Injection Supplies for Vitamin B12: 3cc syringes w/ 27 gauge needles, 1/2 inch length   Quantity:  12 each   Refills:  0        * order for DME   Used for:  Bilateral edema of lower extremity        Equipment being ordered: Bilateral knee high chronic venous insufficiency stockings--  mild-moderate pressures.   Quantity:  4 each   Refills:  5        oxyCODONE 5 MG/5ML solution   Commonly known as:  ROXICODONE   Used for:  Encounter for long-term opiate analgesic use        Dose:  10-15 mg   Take 10-15 mLs (10-15 mg) by mouth every 4 hours as needed for moderate to severe pain Max of 45mg per day. Fill on/after 6/23/17 not to start till 6/24/17.   Quantity:  1350 mL   Refills:  0        senna-docusate 8.6-50 MG per tablet   Commonly known as:  SENOKOT-S;PERICOLACE "   Used for:  Slow transit constipation        Dose:  1-2 tablet   Take 1-2 tablets by mouth 2 times daily as needed for constipation   Quantity:  120 tablet   Refills:  11        sucralfate 1 GM/10ML suspension   Commonly known as:  CARAFATE   Used for:  Nausea        Dose:  1 g   Take 10 mLs (1 g) by mouth 4 times daily   Quantity:  1200 mL   Refills:  11        vitamin D 79273 UNIT capsule   Commonly known as:  ERGOCALCIFEROL   Used for:  Vitamin D deficiency        Dose:  77115 Units   Take 1 capsule (50,000 Units) by mouth every 7 days   Quantity:  12 capsule   Refills:  3        * Notice:  This list has 8 medication(s) that are the same as other medications prescribed for you. Read the directions carefully, and ask your doctor or other care provider to review them with you.            Summary of Visit     Reason for your hospital stay       You were admitted for blood culture from your PICC growing bacteria. This was likely colonization and it was removed. Cultures from your port remained negative and it should be ok to use at this time for TPN.             After Care     Activity       Your activity upon discharge: activity as tolerated       Diet       Follow this diet upon discharge: Orders Placed This Encounter      Regular Diet Adult       Discharge Instructions       1. Ok to use port for TPN  2. Follow up with PCP in 1 week due to hospitalization             Referrals     Home infusion referral       Your provider has referred you to: [facility] [place] [phone]   [email or web address]    Local Address (if different from home address): N/A    Anticipated Length of Therapy: per md order    Home Infusion Pharmacist to adjust therapy based on labs and clinical assessments: Yes    Labs:  May draw labs from Venous Catheter: Yes  Home Infusion Pharmacist to order labs based on therapy type and clinical assessments: Yes  Call/Fax Lab Results to:  pcp    Agency Staff to assess nursing needs for Infusion Therapy.    Access Device Management:  IV Access Type: Port-a-Cath  Flush with Heparin and Normal Saline IVP PRN and routine site care (per agency protocol) to maintain access device? Yes    TPN and supplies. Please resume previous prescription             Your next 10 appointments already scheduled     Jul 03, 2017 11:00 AM CDT   New Visit with Eduardo [facility]nard MD   [facility]    [address] [address]   [phone]            Jul 05, 2017  2:30 PM CDT   Return Visit with Patti Milligan MD   [facility] ([facility])    [id number] [place] [address]   [phone]            Jul 06, 2017 11:20 AM CDT   [facility]    [address], [address]  [place] [address]   [phone]           Bring a current list of meds and any records pertaining to this visit.  For Physicals, please bring immunization records and any forms needing to be filled out.  Please arrive 10 minutes early to complete paperwork.            Aug 01, 2017 11:00 AM CDT   Office Visit with Esteban [facility]aly MD   [facility]    [address], [address]  [place] [address]   [phone]           Bring a current list of meds and any records pertaining to this visit.  For Physicals, please bring immunization records and any forms needing to be filled out.  Please arrive 10 minutes early to complete paperwork.              Follow-Up Appointment Instructions     Future Labs/Procedures    Adult [facility] Follow-up and recommended labs and tests     Comments:    Follow up with primary care provider, Esteban Daly, within 7 days for hospital follow- up.  The following labs/tests are recommended: BMP and CBC.    Appointments on  [place] and/or [facility] (with [facility] or [facility] provider or service). Call [phone] if you haven't heard regarding these appointments within 7 days of discharge.      Follow-Up Appointment Instructions     Adult [facility] Follow-up and recommended labs and tests       Follow up with primary care provider, Esteban Daly, within 7 days for hospital follow- up.  The following labs/tests are recommended: BMP and CBC.    Appointments on [place] and/or [facility] (with [facility] or [facility] provider or service). Call [phone] if you haven't heard regarding these appointments within 7 days of discharge.             Statement of Approval     Ordered          06/30/17 0956  I have reviewed and agree with all the recommendations and orders detailed in this document.  EFFECTIVE NOW     Approved and electronically signed by:  Juanjose Anderson MD

## 2017-06-29 ENCOUNTER — HOME INFUSION (PRE-WILLOW HOME INFUSION) (OUTPATIENT)
Dept: PHARMACY | Facility: CLINIC | Age: 47
End: 2017-06-29

## 2017-06-29 ENCOUNTER — CARE COORDINATION (OUTPATIENT)
Dept: CARE COORDINATION | Facility: CLINIC | Age: 47
End: 2017-06-29

## 2017-06-29 VITALS
RESPIRATION RATE: 16 BRPM | BODY MASS INDEX: 28.35 KG/M2 | DIASTOLIC BLOOD PRESSURE: 77 MMHG | HEART RATE: 80 BPM | TEMPERATURE: 98 F | HEIGHT: 71 IN | OXYGEN SATURATION: 98 % | SYSTOLIC BLOOD PRESSURE: 114 MMHG | WEIGHT: 202.5 LBS

## 2017-06-29 LAB
ALBUMIN UR-MCNC: NEGATIVE MG/DL
APPEARANCE UR: CLEAR
BILIRUB UR QL STRIP: NEGATIVE
COLOR UR AUTO: YELLOW
CRP SERPL-MCNC: 43 MG/L (ref 0–8)
ERYTHROCYTE [DISTWIDTH] IN BLOOD BY AUTOMATED COUNT: 14.7 % (ref 10–15)
GLUCOSE UR STRIP-MCNC: NEGATIVE MG/DL
HCT VFR BLD AUTO: 33.7 % (ref 40–53)
HGB BLD-MCNC: 10.9 G/DL (ref 13.3–17.7)
HGB UR QL STRIP: ABNORMAL
KETONES UR STRIP-MCNC: NEGATIVE MG/DL
LEUKOCYTE ESTERASE UR QL STRIP: NEGATIVE
MCH RBC QN AUTO: 29.4 PG (ref 26.5–33)
MCHC RBC AUTO-ENTMCNC: 32.3 G/DL (ref 31.5–36.5)
MCV RBC AUTO: 91 FL (ref 78–100)
MUCOUS THREADS #/AREA URNS LPF: PRESENT /LPF
NITRATE UR QL: NEGATIVE
PH UR STRIP: 7.5 PH (ref 5–7)
PLATELET # BLD AUTO: 139 10E9/L (ref 150–450)
RBC # BLD AUTO: 3.71 10E12/L (ref 4.4–5.9)
RBC #/AREA URNS AUTO: 14 /HPF (ref 0–2)
SP GR UR STRIP: 1.01 (ref 1–1.03)
TRANS CELLS #/AREA URNS HPF: <1 /HPF (ref 0–1)
URN SPEC COLLECT METH UR: ABNORMAL
UROBILINOGEN UR STRIP-MCNC: NORMAL MG/DL (ref 0–2)
WBC # BLD AUTO: 5.8 10E9/L (ref 4–11)
WBC #/AREA URNS AUTO: 0 /HPF (ref 0–2)

## 2017-06-29 PROCEDURE — 93005 ELECTROCARDIOGRAM TRACING: CPT

## 2017-06-29 PROCEDURE — 86140 C-REACTIVE PROTEIN: CPT | Performed by: PEDIATRICS

## 2017-06-29 PROCEDURE — 36592 COLLECT BLOOD FROM PICC: CPT | Performed by: PEDIATRICS

## 2017-06-29 PROCEDURE — 99223 1ST HOSP IP/OBS HIGH 75: CPT | Mod: AI | Performed by: PEDIATRICS

## 2017-06-29 PROCEDURE — 93010 ELECTROCARDIOGRAM REPORT: CPT | Performed by: INTERNAL MEDICINE

## 2017-06-29 PROCEDURE — 25000125 ZZHC RX 250: Performed by: STUDENT IN AN ORGANIZED HEALTH CARE EDUCATION/TRAINING PROGRAM

## 2017-06-29 PROCEDURE — 25000128 H RX IP 250 OP 636: Performed by: STUDENT IN AN ORGANIZED HEALTH CARE EDUCATION/TRAINING PROGRAM

## 2017-06-29 PROCEDURE — 25000132 ZZH RX MED GY IP 250 OP 250 PS 637: Performed by: STUDENT IN AN ORGANIZED HEALTH CARE EDUCATION/TRAINING PROGRAM

## 2017-06-29 PROCEDURE — 85027 COMPLETE CBC AUTOMATED: CPT | Performed by: PEDIATRICS

## 2017-06-29 PROCEDURE — 12000001 ZZH R&B MED SURG/OB UMMC

## 2017-06-29 PROCEDURE — 99207 ZZC APP CREDIT; MD BILLING SHARED VISIT: CPT | Performed by: INTERNAL MEDICINE

## 2017-06-29 RX ORDER — ONDANSETRON 4 MG/1
4 TABLET, ORALLY DISINTEGRATING ORAL EVERY 6 HOURS PRN
Status: DISCONTINUED | OUTPATIENT
Start: 2017-06-29 | End: 2017-06-30 | Stop reason: HOSPADM

## 2017-06-29 RX ORDER — LIDOCAINE/PRILOCAINE 2.5 %-2.5%
CREAM (GRAM) TOPICAL 2 TIMES DAILY PRN
Status: DISCONTINUED | OUTPATIENT
Start: 2017-06-29 | End: 2017-06-30 | Stop reason: HOSPADM

## 2017-06-29 RX ORDER — DIPHENHYDRAMINE HYDROCHLORIDE 50 MG/ML
25 INJECTION INTRAMUSCULAR; INTRAVENOUS EVERY 6 HOURS PRN
Status: DISCONTINUED | OUTPATIENT
Start: 2017-06-29 | End: 2017-06-30 | Stop reason: HOSPADM

## 2017-06-29 RX ORDER — NALOXONE HYDROCHLORIDE 0.4 MG/ML
.1-.4 INJECTION, SOLUTION INTRAMUSCULAR; INTRAVENOUS; SUBCUTANEOUS
Status: DISCONTINUED | OUTPATIENT
Start: 2017-06-29 | End: 2017-06-30 | Stop reason: HOSPADM

## 2017-06-29 RX ORDER — ALBUTEROL SULFATE 90 UG/1
2 AEROSOL, METERED RESPIRATORY (INHALATION) EVERY 4 HOURS PRN
Status: DISCONTINUED | OUTPATIENT
Start: 2017-06-29 | End: 2017-06-30 | Stop reason: HOSPADM

## 2017-06-29 RX ORDER — LIDOCAINE 40 MG/G
CREAM TOPICAL
Status: DISCONTINUED | OUTPATIENT
Start: 2017-06-29 | End: 2017-06-30 | Stop reason: HOSPADM

## 2017-06-29 RX ORDER — DEXTROAMPHETAMINE SACCHARATE, AMPHETAMINE ASPARTATE, DEXTROAMPHETAMINE SULFATE AND AMPHETAMINE SULFATE 2.5; 2.5; 2.5; 2.5 MG/1; MG/1; MG/1; MG/1
20 TABLET ORAL 2 TIMES DAILY
Status: DISCONTINUED | OUTPATIENT
Start: 2017-06-29 | End: 2017-06-30 | Stop reason: HOSPADM

## 2017-06-29 RX ORDER — LIDOCAINE 40 MG/G
CREAM TOPICAL
Status: DISCONTINUED | OUTPATIENT
Start: 2017-06-29 | End: 2017-06-29

## 2017-06-29 RX ORDER — AMOXICILLIN 250 MG
1-2 CAPSULE ORAL 2 TIMES DAILY PRN
Status: DISCONTINUED | OUTPATIENT
Start: 2017-06-29 | End: 2017-06-30 | Stop reason: HOSPADM

## 2017-06-29 RX ORDER — CYANOCOBALAMIN 1000 UG/ML
1000 INJECTION, SOLUTION INTRAMUSCULAR; SUBCUTANEOUS
Status: DISCONTINUED | OUTPATIENT
Start: 2017-07-28 | End: 2017-06-30 | Stop reason: HOSPADM

## 2017-06-29 RX ORDER — ERGOCALCIFEROL 1.25 MG/1
50000 CAPSULE, LIQUID FILLED ORAL
Status: DISCONTINUED | OUTPATIENT
Start: 2017-07-05 | End: 2017-06-30 | Stop reason: HOSPADM

## 2017-06-29 RX ORDER — OXYCODONE HCL 5 MG/5 ML
10-15 SOLUTION, ORAL ORAL EVERY 4 HOURS PRN
Status: DISCONTINUED | OUTPATIENT
Start: 2017-06-29 | End: 2017-06-30 | Stop reason: HOSPADM

## 2017-06-29 RX ORDER — FENTANYL 50 UG/1
50 PATCH TRANSDERMAL
Status: DISCONTINUED | OUTPATIENT
Start: 2017-06-29 | End: 2017-06-30 | Stop reason: HOSPADM

## 2017-06-29 RX ORDER — SUCRALFATE ORAL 1 G/10ML
1 SUSPENSION ORAL 4 TIMES DAILY
Status: DISCONTINUED | OUTPATIENT
Start: 2017-06-29 | End: 2017-06-30 | Stop reason: HOSPADM

## 2017-06-29 RX ORDER — SODIUM CHLORIDE 9 MG/ML
INJECTION, SOLUTION INTRAVENOUS CONTINUOUS
Status: DISCONTINUED | OUTPATIENT
Start: 2017-06-29 | End: 2017-06-30 | Stop reason: HOSPADM

## 2017-06-29 RX ORDER — DIPHENHYDRAMINE HCL 25 MG
25 CAPSULE ORAL EVERY 6 HOURS PRN
Status: DISCONTINUED | OUTPATIENT
Start: 2017-06-29 | End: 2017-06-30 | Stop reason: HOSPADM

## 2017-06-29 RX ORDER — ONDANSETRON 2 MG/ML
4 INJECTION INTRAMUSCULAR; INTRAVENOUS EVERY 6 HOURS PRN
Status: DISCONTINUED | OUTPATIENT
Start: 2017-06-29 | End: 2017-06-30 | Stop reason: HOSPADM

## 2017-06-29 RX ORDER — MUPIROCIN 20 MG/G
OINTMENT TOPICAL 3 TIMES DAILY
Status: DISCONTINUED | OUTPATIENT
Start: 2017-06-29 | End: 2017-06-30 | Stop reason: HOSPADM

## 2017-06-29 RX ORDER — NYSTATIN AND TRIAMCINOLONE ACETONIDE 100000; 1 [USP'U]/G; MG/G
CREAM TOPICAL 2 TIMES DAILY PRN
Status: DISCONTINUED | OUTPATIENT
Start: 2017-06-29 | End: 2017-06-30 | Stop reason: HOSPADM

## 2017-06-29 RX ORDER — ONDANSETRON 4 MG/1
8 TABLET, ORALLY DISINTEGRATING ORAL EVERY 8 HOURS PRN
Status: DISCONTINUED | OUTPATIENT
Start: 2017-06-29 | End: 2017-06-29

## 2017-06-29 RX ORDER — DIAZEPAM 5 MG
5 TABLET ORAL DAILY PRN
Status: DISCONTINUED | OUTPATIENT
Start: 2017-06-29 | End: 2017-06-30 | Stop reason: HOSPADM

## 2017-06-29 RX ADMIN — DIAZEPAM 5 MG: 5 TABLET ORAL at 05:19

## 2017-06-29 RX ADMIN — OXYCODONE HYDROCHLORIDE 15 MG: 5 SOLUTION ORAL at 17:32

## 2017-06-29 RX ADMIN — SUCRALFATE 1 G: 1 SUSPENSION ORAL at 13:06

## 2017-06-29 RX ADMIN — SUCRALFATE 1 G: 1 SUSPENSION ORAL at 09:26

## 2017-06-29 RX ADMIN — SODIUM CHLORIDE: 9 INJECTION, SOLUTION INTRAVENOUS at 09:26

## 2017-06-29 RX ADMIN — FENTANYL 1 PATCH: 50 PATCH, EXTENDED RELEASE TRANSDERMAL at 05:20

## 2017-06-29 RX ADMIN — DIPHENHYDRAMINE HYDROCHLORIDE 25 MG: 25 CAPSULE ORAL at 06:28

## 2017-06-29 RX ADMIN — DEXTROAMPHETAMINE SACCHARATE, AMPHETAMINE ASPARTATE, DEXTROAMPHETAMINE SULFATE AND AMPHETAMINE SULFATE 20 MG: 2.5; 2.5; 2.5; 2.5 TABLET ORAL at 09:27

## 2017-06-29 RX ADMIN — ONDANSETRON 4 MG: 4 TABLET, ORALLY DISINTEGRATING ORAL at 17:32

## 2017-06-29 RX ADMIN — MUPIROCIN: 20 OINTMENT TOPICAL at 09:26

## 2017-06-29 RX ADMIN — OXYCODONE HYDROCHLORIDE 15 MG: 5 SOLUTION ORAL at 09:27

## 2017-06-29 RX ADMIN — OXYCODONE HYDROCHLORIDE 15 MG: 5 SOLUTION ORAL at 05:17

## 2017-06-29 RX ADMIN — ONDANSETRON 4 MG: 4 TABLET, ORALLY DISINTEGRATING ORAL at 09:26

## 2017-06-29 RX ADMIN — MUPIROCIN: 20 OINTMENT TOPICAL at 19:36

## 2017-06-29 RX ADMIN — OXYCODONE HYDROCHLORIDE 15 MG: 5 SOLUTION ORAL at 13:39

## 2017-06-29 ASSESSMENT — ACTIVITIES OF DAILY LIVING (ADL)
RETIRED_COMMUNICATION: 0-->UNDERSTANDS/COMMUNICATES WITHOUT DIFFICULTY
AMBULATION: 0-->INDEPENDENT
SWALLOWING: 0-->SWALLOWS FOODS/LIQUIDS WITHOUT DIFFICULTY
TOILETING: 0-->INDEPENDENT
RETIRED_EATING: 0-->INDEPENDENT
DRESS: 0-->INDEPENDENT
TRANSFERRING: 0-->INDEPENDENT
COGNITION: 0 - NO COGNITION ISSUES REPORTED
FALL_HISTORY_WITHIN_LAST_SIX_MONTHS: NO
BATHING: 0-->INDEPENDENT

## 2017-06-29 NOTE — PROGRESS NOTES
[facility] Progress Note    Date of Admission: 6/28/2017  Hospital Day #: 0   Date of Service (when I saw the patient): 06/29/2017     Assessment & Plan   Parker Acevedo Sr is a 46 year old male with a history of  gastric bypass complicated by severe malnutrition/short gut syndrome now on chronic TPN presenting with fevers and positive blood cultures.     # Fevers   # Positive PICC line culture  # Hx of port-a-cath hub infection  Patient had PICC line placed 3/23/17 for TPN given concerns for port-a-cath hub infection per ID note on 3/16/17. Blood cultures obtained at that time from port-a-cath were negative. Patient has been having pain around the site of port along with back pain since then. He also notes fevers and chills over the last 3 weeks along with redness at PICC line site. PICC removed 6/27.  - Discontinue vancomycin for now.  - PICC line was likely colonized and removal should be sufficient treatment.  - Monitor port-a-cath blood cultures through today.  - If no growth from port, likely that it would be OK to use this for TPN    # Chest wall tenderness  Patient notes chest wall tenderness radiating from left to right and to back, he initially thought this was related to PICC line, however, it persists despite removal. Noted to have tenderness with palpation of chest wall and notes improvement when walking because the muscles are stretched. He has echo stress test 2/2017 negative for ischemia. He has cardiology appointment on 7/3/17.  - EKG  - Continue to monitor    # Gastric bypass c/b short gut syndrome  # Malnutrition  - Hold TPN while we await port-a-cath cultures  - Continue IVF @ 125 mL/hr  - Regular diet, pt eats small snacks at home    # Chronic normocytic anemia  Currently stable    # ADHD  - Continue home Aderall     FEN  - Regular diet, TPN once port cultures NGTD  - PRN lyte replacement    Prophy/Misc  - VTE: Mechanical, ambulate  - GI/PUD: PRN  - Bowels: PRN     Lines:   - Port-a-cath R  "chest (11/2/16 - )    Consults: None    Code status: Full  Disposition: Inpatient for PICC line colonization, likely discharge tomorrow.    Patient seen and discussed with Dr. Anderson, who agrees with the above assessment and plan.    Fifi Jose  PGY2 IM  [id number]  _____________________________________________________________________________  Interval History   NAEON. Pt notes continued chest wall pain and tenderness around port site. No fevers or chills overnight. Notes that he has cardiology appointment coming up.    Physical Exam   /73 (BP Location: Left arm)  Pulse 58  Temp 97.1  F (36.2  C) (Oral)  Resp 18  Ht 1.803 m (5' 11\")  Wt 91.9 kg (202 lb 8 oz)  SpO2 99%  BMI 28.24 kg/m2       Constitutional: NAD. Sitting up in bed.  HEENT: NCAT. Normal conjunctiva. No scleral icterus.  Respiratory: Non-labored breathing, good air exchange, lungs clear to auscultation bilaterally. No cough or wheeze noted.   Cardiovascular: Regular rate and rhythm. No murmur or rub. Tenderness to palpation over chest wall.  GI: Normoactive bowel sounds. Abdomen soft, non-distended, and non-tender. No palpable masses or organomegaly.  Skin: Warm and dry. No concerning lesions or rash on exposed surfaces. Port site appears C/D/I.  Musculoskeletal: Extremities grossly normal, non-tender, no edema.   Neurologic: A&O x 3, CNs 2-12 grossly intact, speech normal    Medications   Inpatient medications reviewed.    Data   CBC  Recent Labs  Lab 06/28/17  2222   WBC 5.8   RBC 3.63*   HGB 10.8*   HCT 33.0*   MCV 91   MCH 29.8   MCHC 32.7   RDW 14.6   *     CMP  Recent Labs  Lab 06/28/17  2222      POTASSIUM 3.6   CHLORIDE 106   CO2 29   ANIONGAP 4   GLC 88   BUN 10   CR 0.79   GFRESTIMATED >90Non  GFR Calc   GFRESTBLACK >90African American GFR Calc   SILAS 8.1*   PROTTOTAL 6.6*   ALBUMIN 3.2*   BILITOTAL 0.3   ALKPHOS 77   AST 15   ALT 21     INRNo lab results found in last 7 days.    Results for orders placed or " performed during the hospital encounter of 06/28/17   Chest XR,  PA & LAT    Narrative    Examination: XR CHEST 2 VW, 6/28/2017 10:17 PM    Comparison: 5/9/2017    History: fever, eval for infiltrate    Findings: Right IJ port tip at the level of the low SVC.  Cardiomediastinal silhouette is within normal limits. Stable right  infrahilar opacities. Costophrenic angles are sharp. No pneumothorax.      Impression    Impression: Stable right infrahilar opacities. No acute findings.    I have personally reviewed the examination and initial interpretation  and I agree with the findings.    BLU OZUNA MD     *Note: Due to a large number of results and/or encounters for the requested time period, some results have not been displayed. A complete set of results can be found in Results Review.

## 2017-06-29 NOTE — PROGRESS NOTES
This is a snapshot of the patient's [facility] medical record. For complete information or questions call [phone] or In [facility] ([id number]).  [facility] Number:  [id number]

## 2017-06-29 NOTE — PLAN OF CARE
"Problem: Goal Outcome Summary  Goal: Goal Outcome Summary  Outcome: No Change  [id number]:     Pt here for fevers and positive blood cultures from old PICC line. Pt A&Ox4, VSS on RA, c/o abd discomfort - PRN Oxy given x2, PRN Zofran given x1 for nausea, improving. R chest port drsg changed, appears to be slightly pink at site with pt c/o tenderness at site - MD aware. R port infusing NS @ 125 mL/hr per MD order (cleared with MD to continue using port although final culture results are not back yet). Midline order discontinued. Gastrostomy tube clamped, pt states he vents it independently (no output seen by this RN), drsg CDI. Abd rash appears to be improving. Vanco discontinued until results of  blood cultures back. Fentanyl patch in place on R deltoid. Regular diet, poor appetite (snacks during day), up independently (off unit majority of time, pt states he is smoking or \"just walking around\"). Wife at bedside.     Plan: dc home tomorrow pending blood cultures     Continue to monitor and follow POC      "

## 2017-06-29 NOTE — ED PROVIDER NOTES
History     Chief Complaint   Patient presents with     Abnormal Labs     HPI  Parker Acevedo Sr is a 46 year old male with a history of GERD, hiatal hernia, gastric ulcers, short gut syndrome, severe malnutrition on TPN, cardiomyopathy, obesity s/p gastric bypass, appendectomy, cholecystectomy, and multiple EGDs who presents for evaluation of fevers in the setting of positive blood cultures. Patient reports two days ago he had blood cultures drawn from his PICC line, and received a telephone call today informing him that his blood cultures were positive, so a [facility] nurse came to his home to remove his PICC line (this was done a few hours ago). PICC line was initially placed approximately two months ago. Patient reports 2-3 hours after his PICC line was removed he developed a fever up to 102 degrees at home. He reports he has been having intermittent fevers over the past few weeks. He also complains of chest pain, back pain, as well as a mild cough with associated wheezing and shortness of breath. No productive sputum. He has been getting night sweats and chills as well as abdominal pain, nausea, and vomiting (though well controlled with Zofran). He has had decreased fluid intake over the past couple of days. No changes in bowel movements or urinary symptoms. Additionally, patient reports he has had a generalized rash on his abdomen, scalp, and bilateral upper extremities for the past two days. His last course of antibiotics was approximately 6 months ago.     I have reviewed the Medications, Allergies, Past Medical and Surgical History, and Social History in the [facility] system.  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)      Chronic abdominal pain      Difficulty swallowing       Gastric ulcer, unspecified as acute or chronic, without mention of hemorrhage, perforation, or obstruction      Gastro-oesophageal reflux disease      Head injury      Hiatal hernia      Other bladder disorder      Other chronic pain      Severe malnutrition (H)     TPN     Short gut syndrome      Tobacco abuse        Past Surgical History:   Procedure Laterality Date     APPENDECTOMY       BACK SURGERY  11/3/2014    curve in the spine     BIOPSY LYMPH NODE CERVICAL N/A 2/20/2015    Procedure: BIOPSY LYMPH NODE CERVICAL;  Surgeon: Baron Scanlon MD;  Location: [facility] OR     C GASTRIC BYPASS,OBESE<100CM SHAYLEE-EN-Y  2002    lost 300 pounds     CHOLECYSTECTOMY       DISCECTOMY, FUSION CERVICAL ANTERIOR ONE LEVEL, COMBINED N/A 2/15/2017    Procedure: COMBINED DISCECTOMY, FUSION CERVICAL ANTERIOR ONE LEVEL;  Surgeon: Darren Campos MD;  Location: [facility]     ENDOSCOPIC INSERTION TUBE GASTROSTOMY  9/9/2013    Procedure: ENDOSCOPIC INSERTION TUBE GASTROSTOMY;;  Surgeon: Francis Vyas MD;  [facility]     ENDOSCOPIC ULTRASOUND UPPER GASTROINTESTINAL TRACT (GI)  4/29/2011    Procedure:ENDOSCOPIC ULTRASOUND UPPER GASTROINTESTINAL TRACT (GI); Both Procedures done Conjointly; Surgeon:NEREIDA HOUSER; [facility]     ENDOSCOPIC ULTRASOUND UPPER GASTROINTESTINAL TRACT (GI)  9/9/2013    Procedure: ENDOSCOPIC ULTRASOUND UPPER GASTROINTESTINAL TRACT (GI);  Endoscopic Ultrasound Guide Gastrostomy Tube Placement  C-arm;  Surgeon: Noe Lizarraga MD;  [facility]     ENDOSCOPY  03/25/11    EGD, [facility]     ENDOSCOPY  08/04/09    Upper Endoscopy, [facility]     ENDOSCOPY  01/05/09    Upper Endoscopy, [facility]     ESOPHAGOSCOPY, GASTROSCOPY, DUODENOSCOPY (EGD), COMBINED  4/20/2011    Procedure:COMBINED ESOPHAGOSCOPY, GASTROSCOPY, DUODENOSCOPY (EGD); Surgeon:FRANCIS VYAS; [facility]     ESOPHAGOSCOPY, GASTROSCOPY, DUODENOSCOPY (EGD), COMBINED  6/15/2011     Procedure:COMBINED ESOPHAGOSCOPY, GASTROSCOPY, DUODENOSCOPY (EGD); Surgeon:BLU VYAS; [facility]     ESOPHAGOSCOPY, GASTROSCOPY, DUODENOSCOPY (EGD), COMBINED  6/12/2013    Procedure: COMBINED ESOPHAGOSCOPY, GASTROSCOPY, DUODENOSCOPY (EGD);;  Surgeon: Blu Vyas MD;  [facility]     ESOPHAGOSCOPY, GASTROSCOPY, DUODENOSCOPY (EGD), COMBINED  11/22/2013    Procedure: COMBINED ESOPHAGOSCOPY, GASTROSCOPY, DUODENOSCOPY (EGD);;  Surgeon: Blu Vyas MD;  [facility]     ESOPHAGOSCOPY, GASTROSCOPY, DUODENOSCOPY (EGD), COMBINED  4/30/2014    Procedure: COMBINED ESOPHAGOSCOPY, GASTROSCOPY, DUODENOSCOPY (EGD);  Surgeon: Blu Vyas MD;  Location:  GI     ESOPHAGOSCOPY, GASTROSCOPY, DUODENOSCOPY (EGD), COMBINED N/A 2/20/2015    Procedure: COMBINED ESOPHAGOSCOPY, GASTROSCOPY, DUODENOSCOPY (EGD), BIOPSY SINGLE OR MULTIPLE;  Surgeon: Baron Scanlon MD;  Location:  OR     ESOPHAGOSCOPY, GASTROSCOPY, DUODENOSCOPY (EGD), COMBINED N/A 9/30/2015    Procedure: COMBINED ESOPHAGOSCOPY, GASTROSCOPY, DUODENOSCOPY (EGD);  Surgeon: Blu Vyas MD;  Location:  GI     GASTRECTOMY  6/22/2012    Procedure: GASTRECTOMY;  Open Approach, Excise Ulcers,Partial Gastrectomy, Esophagojejunostomy, Hiatal Hernia Repair, Extensive Lysis of Adhesions and Esaphagogastrodudenoscopy.;  Surgeon: Blu Vyas MD;  [facility]     GASTROJEJUNOSTOMY  08/26/09    Extensice enterolysis, partial resect. jejunum, part. resect gastric pouch, gastrojejunostomy anastomosis     HC ESOPH/GAS REFLUX TEST W NASAL IMPED ELECTRODE  8/5/2013    Procedure: ESOPHAGEAL IMPEDENCE FUNCTION TEST 1 HOUR OR LESS;  Surgeon: Halie Lang MD;  Location:  GI     HEAD & NECK SURGERY  2/15/2017    C5-C6     HERNIA REPAIR  2006    Umbilical hernia     HERNIORRHAPHY HIATAL  6/22/2012    Procedure: HERNIORRHAPHY HIATAL;;  Surgeon: Blu Vyas MD;  [facility]     HERNIORRHAPHY  "INGUINAL  11/22/2013    Procedure: HERNIORRHAPHY INGUINAL;;  Surgeon: Francis Vyas MD;  [facility]     LAPAROTOMY EXPLORATORY  11/22/2013    Procedure: LAPAROTOMY EXPLORATORY;  Exploratory Laparotomy, Upper Endoscopy, Left Inguinal Hernia Repair;  Surgeon: Francis Vyas MD;  [facility]     PICC INSERTION Right 03/16/2017    5fr DL BioFlo PICC, 42cm (3cm external) in the R medial brachial vein w/ tip in the SVC RA junction.     SHAYLEE EN Y BOWEL  2003     SOFT TISSUE SURGERY       SOFT TISSUE SURGERY       TONSILLECTOMY         Family History   Problem Relation Age of Onset     GASTROINTESTINAL DISEASE Mother      Crohns disease     Anxiety Disorder Mother      Thyroid Disease Mother      Grave's disease     CANCER Father      ear cancer-skin cancer/melanoma     Breast Cancer Maternal Grandmother      DIABETES Maternal Uncle      Breast Cancer Other      Hypertension No family hx of      Hyperlipidemia No family hx of      CEREBROVASCULAR DISEASE No family hx of      Prostate Cancer No family hx of      Depression No family hx of      Anesthesia Reaction No family hx of      Asthma No family hx of      OSTEOPOROSIS No family hx of      Genetic Disorder No family hx of      Obesity No family hx of      MENTAL ILLNESS No family hx of      Substance Abuse No family hx of        Social History   Substance Use Topics     Smoking status: Light Tobacco Smoker     Packs/day: 0.10     Years: 3.00     Types: Cigarettes     Smokeless tobacco: Current User      Comment: I use an e cig every now and than     Alcohol use No      Comment: quit      Current Facility-Administered Medications   Medication     diphenhydrAMINE (BENADRYL) injection 25 mg     vancomycin (VANCOCIN) 1,500 mg in NaCl 0.9 % 250 mL intermittent infusion     ondansetron (ZOFRAN) injection 4 mg     oxyCODONE (ROXICODONE) solution 15 mg     Current Outpatient Prescriptions   Medication     Needle, Disp, (BD DISP NEEDLES) 27G X 1/2\" MISC "     sucralfate (CARAFATE) 1 GM/10ML suspension     cyanocobalamin (VITAMIN B12) 1000 MCG/ML injection     lidocaine-prilocaine (EMLA) cream     morphine 0.1% in intrasite topical gel     oxyCODONE (ROXICODONE) 5 MG/5ML solution     fentaNYL (DURAGESIC) 50 mcg/hr 72 hr patch     [START ON 7/3/2017] amphetamine-dextroamphetamine (ADDERALL) 20 MG per tablet     ondansetron (ZOFRAN-ODT) 8 MG ODT tab     vitamin D (ERGOCALCIFEROL) 04211 UNIT capsule     diazepam (VALIUM) 5 MG tablet     albuterol (PROAIR HFA/PROVENTIL HFA/VENTOLIN HFA) 108 (90 BASE) MCG/ACT Inhaler     amphetamine-dextroamphetamine (ADDERALL) 20 MG per tablet     amphetamine-dextroamphetamine (ADDERALL) 20 MG per tablet     [facility] INFUSION MANAGED PATIENT     mupirocin (BACTROBAN) 2 % ointment     amphetamine-dextroamphetamine (ADDERALL) 20 MG per tablet     nystatin-triamcinolone (MYCOLOG II) cream     order for DME     [facility] INFUSION MANAGED PATIENT     senna-docusate (SENOKOT-S;PERICOLACE) 8.6-50 MG per tablet     order for DME     [DISCONTINUED] NO ACTIVE MEDICATIONS     Facility-Administered Medications Ordered in Other Encounters   Medication     DOBUTamine 500 mg in dextrose 5% 250 mL (adult std)        Allergies   Allergen Reactions     Bactrim [Sulfamethoxazole W/Trimethoprim] Rash     Penicillins Anaphylaxis     Doxycycline Rash     Vancomycin Rash     Rash after receiving vancomycin 3/28/16 (red man's?). Tolerated with slower infusion and diphenhydramine premed.       Review of Systems   Constitutional: Positive for fever (102 degrees @ home).   Respiratory: Positive for cough (dry) and wheezing.    Cardiovascular: Positive for chest pain.   Gastrointestinal: Positive for abdominal pain, nausea and vomiting. Negative for anal bleeding, blood in stool, constipation and diarrhea.   Genitourinary: Negative for dysuria, hematuria and urgency.   Musculoskeletal: Positive for back pain.   All other systems reviewed and are  "negative.      Physical Exam   BP: 125/78  Pulse: 77  Temp: 97.9  F (36.6  C)  Resp: 18  Height: 180.3 cm (5' 11\")  Weight: 78.9 kg (174 lb)  SpO2: 98 %  Physical Exam   Constitutional: No distress.   Chronically ill appearing, tired.  Appears to be under the influence of opiates.  Nodding off, small pupils (on chronic opiates).   HENT:   Head: Atraumatic.   Mouth/Throat: Oropharynx is clear and moist. No oropharyngeal exudate.   Eyes: Pupils are equal, round, and reactive to light. No scleral icterus.   Pinpoint pupils   Cardiovascular: Normal heart sounds and intact distal pulses.    Pulmonary/Chest: No respiratory distress. He has no wheezes. He has no rales.   Slight rhonchi RLL   Abdominal: Soft. There is no tenderness.   Soft, flat, nontender on palpation with distraction, hypoactive bowel sounds   Musculoskeletal: He exhibits no edema or tenderness.   Skin: Skin is warm. Rash noted. He is not diaphoretic.   Excoriated rash on abdomen, neck and extensor surfaces of UEs B   Psychiatric:   Flat affect   Nursing note and vitals reviewed.      ED Course     ED Course     Procedures       9:44 PM  The patient was seen and examined by Dr. Oviedo in Room 15.          Critical Care time:  none               Results for orders placed or performed during the hospital encounter of 06/28/17 (from the past 24 hour(s))   Blood culture   Result Value Ref Range    Specimen Description Blood Portacath     Culture Micro Pending     Micro Report Status Pending    CBC with platelets differential   Result Value Ref Range    WBC 5.8 4.0 - 11.0 10e9/L    RBC Count 3.63 (L) 4.4 - 5.9 10e12/L    Hemoglobin 10.8 (L) 13.3 - 17.7 g/dL    Hematocrit 33.0 (L) 40.0 - 53.0 %    MCV 91 78 - 100 fl    MCH 29.8 26.5 - 33.0 pg    MCHC 32.7 31.5 - 36.5 g/dL    RDW 14.6 10.0 - 15.0 %    Platelet Count 136 (L) 150 - 450 10e9/L    Diff Method Automated Method     % Neutrophils 50.8 %    % Lymphocytes 31.0 %    % Monocytes 12.8 %    % Eosinophils " 4.9 %    % Basophils 0.3 %    % Immature Granulocytes 0.2 %    Nucleated RBCs 0 0 /100    Absolute Neutrophil 2.9 1.6 - 8.3 10e9/L    Absolute Lymphocytes 1.8 0.8 - 5.3 10e9/L    Absolute Monocytes 0.7 0.0 - 1.3 10e9/L    Absolute Eosinophils 0.3 0.0 - 0.7 10e9/L    Absolute Basophils 0.0 0.0 - 0.2 10e9/L    Abs Immature Granulocytes 0.0 0 - 0.4 10e9/L    Absolute Nucleated RBC 0.0    Comprehensive metabolic panel   Result Value Ref Range    Sodium 138 133 - 144 mmol/L    Potassium 3.6 3.4 - 5.3 mmol/L    Chloride 106 94 - 109 mmol/L    Carbon Dioxide 29 20 - 32 mmol/L    Anion Gap 4 3 - 14 mmol/L    Glucose 88 70 - 99 mg/dL    Urea Nitrogen 10 7 - 30 mg/dL    Creatinine 0.79 0.66 - 1.25 mg/dL    GFR Estimate >90  Non  GFR Calc   >60 mL/min/1.7m2    GFR Estimate If Black >90   GFR Calc   >60 mL/min/1.7m2    Calcium 8.1 (L) 8.5 - 10.1 mg/dL    Bilirubin Total 0.3 0.2 - 1.3 mg/dL    Albumin 3.2 (L) 3.4 - 5.0 g/dL    Protein Total 6.6 (L) 6.8 - 8.8 g/dL    Alkaline Phosphatase 77 40 - 150 U/L    ALT 21 0 - 70 U/L    AST 15 0 - 45 U/L   Lactic acid   Result Value Ref Range    Lactic Acid 0.4 (L) 0.7 - 2.1 mmol/L   Lipase   Result Value Ref Range    Lipase 125 73 - 393 U/L   Erythrocyte sedimentation rate auto   Result Value Ref Range    Sed Rate 26 (H) 0 - 15 mm/h   CRP inflammation   Result Value Ref Range    CRP Inflammation 53.0 (H) 0.0 - 8.0 mg/L   Blood culture   Result Value Ref Range    Specimen Description Blood Right Arm     Culture Micro Pending     Micro Report Status Pending      *Note: Due to a large number of results and/or encounters for the requested time period, some results have not been displayed. A complete set of results can be found in Results Review.              Assessments & Plan (with Medical Decision Making)   This is a 46-year-old with a long history of chronic malnutrition, chronic pain, dehydration, who is TPN dependent.  He has had sepsis and bacteremia  issues in the past, though he believes that he has been off of antibiotics for several months now.  He is coming in because of a positive blood culture.  The blood culture was drawn 2 days ago from his PICC line.  The PICC line was pulled today, but shortly afterwards he began to spike fevers.    Need to consider the possibility of bacteremia versus line infection.  He had both a port and a PICC as they were having trouble with superficial skin infections over his port accessed site they were not using his port for a period time.  Review of blood cultures shows that the blood culture from June 26 is preliminarily positive for Staphylococcus epidermidis which could be a contaminant however he is spiking fevers and feeling ill.    We did establish IV access and we did draw blood culture ×2 (1 peripheral and one from the port).  CBC shows hgb of 10.8, WBC WNL, lactate 0.4, comprehensive metabolic panel shows low albumin and protein, otherwise WNL.  ESR high at 26 and CRP high at 53.  Patient was given IV fluids here in the emergency department, I do believe we should cover him for possible bacteremia, he is allergic to Bactrim, penicillin, doxycycline and vancomycin.  The ID fellow had left a note in his chart with regard to his positive blood culture.  I did speak with Dr. Solo (ID) directly and given his multiple allergies; he recommends treating with vancomycin (prior reaction was rash/red man syndrome).  Recommends pre-treatment with benadryl and reducing the infusion rate by half.  Will monitor for reaction.  Plan to admit at this point for ongoing monitoring and care given concern for bacteremia.    I have reviewed the nursing notes.    I have reviewed the findings, diagnosis, plan and need for follow up with the patient.    New Prescriptions    No medications on file       Final diagnoses:   Positive blood culture   Fever, unspecified   Lauren NJ, am serving as a trained medical scribe to document  services personally performed by Ramona Oviedo MD, based on the provider's statements to me.   I, Ramona Oviedo MD, was physically present and have reviewed and verified the accuracy of this note documented by Lauren Hunter.      6/28/2017   [facility], [place], EMERGENCY DEPARTMENT     Ramona Oviedo MD  06/28/17 [id number]

## 2017-06-29 NOTE — PROGRESS NOTES
"Paged team about pt's concern over having a PIV placed.  Pt just had PICC removed today and has chest port, pt states that PIVs \"blow\" and bruise him and do not work, is requesting a midline instead.  Also messaged team about pt's allergy to vanco and PNC, pt currently infusing vanco, was given benadryl and monitored in ED. Per team continue to monitor for reaction.  "

## 2017-06-29 NOTE — PLAN OF CARE
Pt here w/ fever and positive blood cultures.  A&Ox4.  Calm and cooperative.  Up independently, wife @ bedside.  Pt leaves unit frequently. VSS on RA.  R chest port WNL infusing IV vanco and NS @125ml/hr.  Pt allergic to Penicillins and vanco.  Pt was infusing vanco when admitted from ER, team notified.  Rash on abdomen,back, face, and arms, itching relieved w/PRN 25mg Benadryl.  Pt had PICC removed yesterday d/t positive blood cultures, awaiting orders for midline placement, as pt does not want PIV.  UA and blood cultures sent from ER to r/o bacteremia vs line infection.  Voiding WNL, no BM this shift.  Valium and oxy given x 1 this shift.  Fentanyl patch of R deltoid.   Continue to monitor and follow POC.

## 2017-06-29 NOTE — PHARMACY-VANCOMYCIN DOSING SERVICE
Pharmacy Empiric Dose Change Per Policy    Original Dose Ordered: Vancomycin 1500 mg IVPB Q 12 hrs  Dose Changed To: Vancomycin 2000 mg IVPB Q 12 hrs    This dose change was based on the pharmacist's assessment of this patient's age, weight, concurrent drug therapy, treatment goals, whether patient's creatinine clearance adequately indicates renal function (factoring in age, muscle mass, fluid and clinical status), and, if applicable, prior pharmacokinetic data.    Creatinine Clearance=     Estimated Creatinine Clearance: 138.2 mL/min (based on Cr of 0.79).  Will continue to follow and modify dosage according to levels, organ function and clinical condition

## 2017-06-29 NOTE — PROGRESS NOTES
Clinic Care Coordination Contact    Situation: Patient chart reviewed by care coordinator.    Background: RN CC received an ED follow up report.    Assessment: Patient is currently inpatient at [facility].    Plan/Recommendations: RN CC will monitor for patient discharge.    Melissa Behl BSN, RN, N  [facility] Care Coordinator  [phone]  [email or web address]

## 2017-06-29 NOTE — H&P
"Internal Medicine History and Physical  Date of service: 2017  Attending physician: Juanjose Anderson      Patient: Parker Acevedo Sr      : 1970      MRN: [id number]          Chief complaint:   Fevers after PICC line removed           History of Present Illness:   Parker Acevedo Sr is a 46 year old male with a medical history significant for gastric bypass complicated by severe malnutrition/short sharlene syndrome now on chronic TPN who presented to [facility] w/ fevers. The fevers originally started 2 weeks ago when he believed his PICC line was infected. At this time blood cultures were drawn but were negative. He continued to have intermittent low grade fevers until about 2 days ago when his fever suddenly spiked to 101.3 at home and he started experiencing night sweats. His PICC line was pulled yesterday and blood cultures were positive for staph epidermidis, prompting the patient to present to the ED. The patient also has a port site which is still in place. His fever is accompanied by a rash which is very itchy and occassionally blisters. He denies any mucosal involvement of the rash. He also reports mid-upper back pain which started 2 days ago. Quality is like a \"bad sore muscle\" which is aggravated by movement. Additionally, he is experiencing some headaches and chest pain around his port site. Denies changes in abdominal pain; he normally has crampy pain associated with short gut syndrome.  He denies SOB but has an occasional cough. Was recently treated for aspiration pneumonia in May 2017 with levaquin.              Review of Symptoms:   10-point ROS reviewed, & found negative w/ exceptions noted in the HPI.          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)     mild EF ~45% on rest 17, improves with stressing     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)  "     Chronic abdominal pain      Difficulty swallowing      Gastric ulcer, unspecified as acute or chronic, without mention of hemorrhage, perforation, or obstruction      Gastro-oesophageal reflux disease      Head injury      Hiatal hernia      Other bladder disorder      Other chronic pain      Severe malnutrition (H)     TPN     Short gut syndrome      Tobacco abuse        Past Surgical History:   Procedure Laterality Date     APPENDECTOMY       BACK SURGERY  11/3/2014    curve in the spine     BIOPSY LYMPH NODE CERVICAL N/A 2/20/2015    Procedure: BIOPSY LYMPH NODE CERVICAL;  Surgeon: Baron Scanlon MD;  Location: [facility] OR     C GASTRIC BYPASS,OBESE<100CM SHAYLEE-EN-Y  2002    lost 300 pounds     CHOLECYSTECTOMY       DISCECTOMY, FUSION CERVICAL ANTERIOR ONE LEVEL, COMBINED N/A 2/15/2017    Procedure: COMBINED DISCECTOMY, FUSION CERVICAL ANTERIOR ONE LEVEL;  Surgeon: Darren Campos MD;  Location: [facility]     ENDOSCOPIC INSERTION TUBE GASTROSTOMY  9/9/2013    Procedure: ENDOSCOPIC INSERTION TUBE GASTROSTOMY;;  Surgeon: Francis Vyas MD;  [facility]     ENDOSCOPIC ULTRASOUND UPPER GASTROINTESTINAL TRACT (GI)  4/29/2011    Procedure:ENDOSCOPIC ULTRASOUND UPPER GASTROINTESTINAL TRACT (GI); Both Procedures done Conjointly; Surgeon:NEREIDA HOUSER; Location:[facility]     ENDOSCOPIC ULTRASOUND UPPER GASTROINTESTINAL TRACT (GI)  9/9/2013    Procedure: ENDOSCOPIC ULTRASOUND UPPER GASTROINTESTINAL TRACT (GI);  Endoscopic Ultrasound Guide Gastrostomy Tube Placement  C-arm;  Surgeon: Noe Lizarraga MD;  [facility]     ENDOSCOPY  03/25/11    EGD, [facility]     ENDOSCOPY  08/04/09    Upper Endoscopy, [facility]     ENDOSCOPY  01/05/09    Upper Endoscopy, [facility]     ESOPHAGOSCOPY, GASTROSCOPY, DUODENOSCOPY (EGD), COMBINED  4/20/2011    Procedure:COMBINED ESOPHAGOSCOPY, GASTROSCOPY, DUODENOSCOPY (EGD); Surgeon:FRANCIS VYAS; Location[facility]     ESOPHAGOSCOPY, GASTROSCOPY,  DUODENOSCOPY (EGD), COMBINED  6/15/2011    Procedure:COMBINED ESOPHAGOSCOPY, GASTROSCOPY, DUODENOSCOPY (EGD); Surgeon:BLU VYAS; Location: GI     ESOPHAGOSCOPY, GASTROSCOPY, DUODENOSCOPY (EGD), COMBINED  6/12/2013    Procedure: COMBINED ESOPHAGOSCOPY, GASTROSCOPY, DUODENOSCOPY (EGD);;  Surgeon: Blu Vyas MD;  Location:  GI     ESOPHAGOSCOPY, GASTROSCOPY, DUODENOSCOPY (EGD), COMBINED  11/22/2013    Procedure: COMBINED ESOPHAGOSCOPY, GASTROSCOPY, DUODENOSCOPY (EGD);;  Surgeon: Blu Vyas MD;  Location:  OR     ESOPHAGOSCOPY, GASTROSCOPY, DUODENOSCOPY (EGD), COMBINED  4/30/2014    Procedure: COMBINED ESOPHAGOSCOPY, GASTROSCOPY, DUODENOSCOPY (EGD);  Surgeon: Blu Vyas MD;  Location:  GI     ESOPHAGOSCOPY, GASTROSCOPY, DUODENOSCOPY (EGD), COMBINED N/A 2/20/2015    Procedure: COMBINED ESOPHAGOSCOPY, GASTROSCOPY, DUODENOSCOPY (EGD), BIOPSY SINGLE OR MULTIPLE;  Surgeon: Baron Scanlon MD;  Location:  OR     ESOPHAGOSCOPY, GASTROSCOPY, DUODENOSCOPY (EGD), COMBINED N/A 9/30/2015    Procedure: COMBINED ESOPHAGOSCOPY, GASTROSCOPY, DUODENOSCOPY (EGD);  Surgeon: Blu Vyas MD;  Location:  GI     GASTRECTOMY  6/22/2012    Procedure: GASTRECTOMY;  Open Approach, Excise Ulcers,Partial Gastrectomy, Esophagojejunostomy, Hiatal Hernia Repair, Extensive Lysis of Adhesions and Esaphagogastrodudenoscopy.;  Surgeon: Blu Vyas MD;  [facility]     GASTROJEJUNOSTOMY  08/26/09    Extensice enterolysis, partial resect. jejunum, part. resect gastric pouch, gastrojejunostomy anastomosis     HC ESOPH/GAS REFLUX TEST W NASAL IMPED ELECTRODE  8/5/2013    Procedure: ESOPHAGEAL IMPEDENCE FUNCTION TEST 1 HOUR OR LESS;  Surgeon: Halie Lang MD;  Location:  GI     HEAD & NECK SURGERY  2/15/2017    C5-C6     HERNIA REPAIR  2006    Umbilical hernia     HERNIORRHAPHY HIATAL  6/22/2012    Procedure: HERNIORRHAPHY HIATAL;;  Surgeon: Blu Vyas MD;  " [facility]     HERNIORRHAPHY INGUINAL  11/22/2013    Procedure: HERNIORRHAPHY INGUINAL;;  Surgeon: Francis Vyas MD;  [facility]     LAPAROTOMY EXPLORATORY  11/22/2013    Procedure: LAPAROTOMY EXPLORATORY;  Exploratory Laparotomy, Upper Endoscopy, Left Inguinal Hernia Repair;  Surgeon: Francis Vyas MD;  [facility]     PICC INSERTION Right 03/16/2017    5fr DL BioFlo PICC, 42cm (3cm external) in the R medial brachial vein w/ tip in the SVC RA junction.     SHAYELE EN Y BOWEL  2003     SOFT TISSUE SURGERY       SOFT TISSUE SURGERY       TONSILLECTOMY              Allergies:     Allergies   Allergen Reactions     Bactrim [Sulfamethoxazole W/Trimethoprim] Rash     Penicillins Anaphylaxis     Doxycycline Rash     Vancomycin Rash     Rash after receiving vancomycin 3/28/16 (red man's?). Tolerated with slower infusion and diphenhydramine premed.             Outpatient Medications:       Current Facility-Administered Medications on File Prior to Encounter:  DOBUTamine 500 mg in dextrose 5% 250 mL (adult std)     Current Outpatient Prescriptions on File Prior to Encounter:  Needle, Disp, (BD DISP NEEDLES) 27G X 1/2\" MISC 1 Device every 30 days Use for cyanocobalamin injection once q 30 days.   sucralfate (CARAFATE) 1 GM/10ML suspension Take 10 mLs (1 g) by mouth 4 times daily   cyanocobalamin (VITAMIN B12) 1000 MCG/ML injection Inject 1 mL (1,000 mcg) into the muscle every 30 days   lidocaine-prilocaine (EMLA) cream Apply topically as needed for moderate pain   morphine 0.1% in intrasite topical gel Apply as needed prior to accessing the port site.   oxyCODONE (ROXICODONE) 5 MG/5ML solution Take 10-15 mLs (10-15 mg) by mouth every 4 hours as needed for moderate to severe pain Max of 45mg per day. Fill on/after 6/23/17 not to start till 6/24/17.   fentaNYL (DURAGESIC) 50 mcg/hr 72 hr patch Place 1 patch onto the skin every 48 hours MYLAN BRAND ONLY. Fill on/after 6/23/17 to start on/after 6/25/17 " "  [START ON 7/3/2017] amphetamine-dextroamphetamine (ADDERALL) 20 MG per tablet Take 1 tablet (20 mg) by mouth 2 times daily   ondansetron (ZOFRAN-ODT) 8 MG ODT tab Take 1 tablet (8 mg) by mouth every 8 hours as needed for nausea   vitamin D (ERGOCALCIFEROL) 77279 UNIT capsule Take 1 capsule (50,000 Units) by mouth every 7 days   diazepam (VALIUM) 5 MG tablet Take 1 tablet (5 mg) by mouth daily as needed for anxiety or sleep   albuterol (PROAIR HFA/PROVENTIL HFA/VENTOLIN HFA) 108 (90 BASE) MCG/ACT Inhaler Inhale 2 puffs into the lungs every 4 hours as needed for shortness of breath / dyspnea or wheezing   amphetamine-dextroamphetamine (ADDERALL) 20 MG per tablet Take 1 tablet (20 mg) by mouth 2 times daily   amphetamine-dextroamphetamine (ADDERALL) 20 MG per tablet Take 1 tablet (20 mg) by mouth 2 times daily   [place] [facility]FUSION MANAGED PATIENT Contact [facility] for patient specific medication information at [phone] on admission and discharge from the hospital.  Phones are answered 24 hours a day 7 days a week 365 days a year.Providers - Choose \"CONTINUE HOME MED (no script)\" at discharge if patient treatment with home infusion will continue.   mupirocin (BACTROBAN) 2 % ointment Apply topically 3 times daily   amphetamine-dextroamphetamine (ADDERALL) 20 MG per tablet Take 1 tablet (20 mg) by mouth 2 times daily   nystatin-triamcinolone (MYCOLOG II) cream Apply topically 2 times daily as needed   order for DME Equipment being ordered: Bilateral knee high chronic venous insufficiency stockings--  mild-moderate pressures.   [place] [facility] MANAGED PATIENT Contact [facility] for patient specific medication information at [phone] on admission and discharge from the hospital.  Phones are answered 24 hours a day 7 days a week 365 days a year.Providers - Choose \"CONTINUE HOME MED (no script)\" at discharge if patient treatment with home infusion will continue. " "  senna-docusate (SENOKOT-S;PERICOLACE) 8.6-50 MG per tablet Take 1-2 tablets by mouth 2 times daily as needed for constipation   order for DME Injection Supplies for Vitamin B12: 3cc syringes w/ 27 gauge needles, 1/2 inch length   [DISCONTINUED] NO ACTIVE MEDICATIONS .             Family History:     Family History   Problem Relation Age of Onset     GASTROINTESTINAL DISEASE Mother      Crohns disease     Anxiety Disorder Mother      Thyroid Disease Mother      Grave's disease     CANCER Father      ear cancer-skin cancer/melanoma     Breast Cancer Maternal Grandmother      DIABETES Maternal Uncle      Breast Cancer Other      Hypertension No family hx of      Hyperlipidemia No family hx of      CEREBROVASCULAR DISEASE No family hx of      Prostate Cancer No family hx of      Depression No family hx of      Anesthesia Reaction No family hx of      Asthma No family hx of      OSTEOPOROSIS No family hx of      Genetic Disorder No family hx of      Obesity No family hx of      MENTAL ILLNESS No family hx of      Substance Abuse No family hx of              Social History:   Denies alcohol use. Has started smoking again.           Physical Exam:   /66 (BP Location: Left arm)  Pulse 80  Temp 97.2  F (36.2  C) (Oral)  Resp 18  Ht 1.803 m (5' 11\")  Wt 91.9 kg (202 lb 9.6 oz)  SpO2 95%  BMI 28.26 kg/m2    General: Alert and not in acute distress. Appears mildly uncomfortable.   HEENT: anicteric sclera, PERRL, EOMI, MMM, OP unobstructed, w/o erythema/discharge; no cervical LAD, FROM  CV: RRR, nl S1/S2, no S3/S4 appreciated, no m/r/g;  Lungs: Mild wheezing at lung bases. No use of accessory muscles. No rhonchi/rales/crackles. No point tenderness of spine on percussion.   Abd: Non-tender, non-distended. Bowel sounds present.     Ext: WWP. B/L LE edema. Port in place at R upper chest. Previous PICC line at R upper arm with some erythema and slight induration. No evidence of thrombophlebitis.    Skin: Scattered " red, excoriated papules on abdomen, forehead, L arm, and legs.   Neuro: AOx3, no focal neurologic deficits.          Data:   Labs (all laboratory studies reviewed by me):   Most pertinent for:    ROUTINE ICU LABS (Last four results)  CMP  Recent Labs  Lab 06/28/17  2222      POTASSIUM 3.6   CHLORIDE 106   CO2 29   ANIONGAP 4   GLC 88   BUN 10   CR 0.79   GFRESTIMATED >90Non  GFR Calc   GFRESTBLACK >90African American GFR Calc   SILAS 8.1*   PROTTOTAL 6.6*   ALBUMIN 3.2*   BILITOTAL 0.3   ALKPHOS 77   AST 15   ALT 21     CBC  Recent Labs  Lab 06/28/17  2222   WBC 5.8   RBC 3.63*   HGB 10.8*   HCT 33.0*   MCV 91   MCH 29.8   MCHC 32.7   RDW 14.6   *             Assessment/Plan:   46 year old male with PMHx most significant significant for gastric bypass complicated by severe malnutrition/short sharlene syndrome now on chronic TPN presenting with fevers and positive blood cultures.     1. Fever in the setting of chronic PICC line likely 2/2 bacteremia    A: Hemodynamically stable. Does not meet criteria for sepsis at this time. 1/2 blood cultures drawn on 06/26/17 were positive for staph epidermidis. Patient has allergies to several antibiotics; reaction to vancomycin was determined to be red man syndrome. However, patient has an anaphylactic response to penicillins so zosyn should be avoided. Additionally, there is the possibility of endocarditis but patient has no murmur on exam today; echo may still be considered. Finally, the patient complained of acute onset back pain 2 days ago but has no point tenderness on exam making an epidural abscess or discitis less likely.   - Lactic acid 0.4  - CRP 53    - WBC 5.8   P: Continue IVF  - Continue vancomycin   - Await results of repeat blood cultures   - If fever spikes, obtain an additional set of blood cultures   - Consider ID consult given patient's past history of bacteremia and antibiotic allergies   - Consider echo to r/o endocarditis   - Monitor  CRP and CBC     2. Malnutrition 2/2 gastric bypass and short gut syndrome   A: No acute changes in nutrition status  P: Continue TPN   - Continue B12, vitamin D supplementation     3. Rash associated with fever   A: No mucosal involvement or vesicles/bullae noted on exam; etiology unknown. Recurs with fevers according to patient. May also be related to his nutritional deficiencies.   P: Consider antihistamine or topical steroid if symptomatic     4. Asthma vs. COPD   A: Chronic. Not in respiratory distress at this time. Patient is a smoker and reports he recently started back again 2/2 stress.     P: Continue albuterol inhaler Q4H prn     4. Normocytic anemia likely 2/2 malnutrition vs. chronic disease   A: Chronic. Hemodynamically stable.   -Hbg on admission 10.8  P: Monitor CBC   - Monitor vs for signs/symptoms of blood loss     5. ADHD   A: Chronic   P: Continue home dose of Adderall     FEN:  -- IVF: NS  -- Labs: Routine  -- Diet: TPN    PPX:   -- Ulcer: No PPI indicated at this time  -- VTE: Mechanical prophylaxis     Code Status: Full    Dispo: Admit to inpatient     Patient will be staffed in am.     Kiera Santana MD/MPH  Internal Medicine/Dermatology  PGY-1  [phone]    I have seen this patient with the resident teaching team,  examined patient independently, and agree with above note and exam.    Francis Malagon MD  Med-Peds Hospitalist, pager [phone]

## 2017-06-29 NOTE — PROGRESS NOTES
Care Coordinator Progress Note     Admission Date/Time:  6/28/2017  Attending MD:  Juanjose Anderson MD     Data  Chart reviewed, discussed with interdisciplinary team.   Patient was admitted for:    Positive blood culture  Fever, unspecified  Gastrostomy tube in place (H)  Malnutrition (H).    Concerns with insurance coverage for discharge needs: None.  Current Living Situation: Patient lives with spouse.  Support System: Supportive and Involved  Services Involved: Home Infusion  Transportation: Family or Friend will provide  Barriers to Discharge: medical course    Coordination of Care and Referrals: Provided patient/family with options for Home Infusion.        Assessment  Chart reviewed. Patient admitted with infection to PICC line; PICC line removed and port a cath in place. Met with patient at bedside to discuss discharge planning and needs. Patient states he is currently open to [facility] for TPN (cycled over night) and IV hydration; resumption orders placed. He would feel comfortable doing IV abx at home (has done them in the past) if needed, and choice agency is [facility]. Benefit check completed and he will be covered for IV abx. RNCC to continue to follow for discharge planning and needs.     Plan  Anticipated Discharge Date:  Tomorrow  Anticipated Discharge Plan:  Home with home infusion    Carlita Posadas RN

## 2017-06-29 NOTE — PHARMACY-VANCOMYCIN DOSING SERVICE
Pharmacy Vancomycin Initial Note  Date of Service 2017  Patient's  1970  46 year old, male    Indication: Bacteremia    Current estimated CrCl = Estimated Creatinine Clearance: 202.3 mL/min (based on Cr of 0.52).    Creatinine for last 3 days  No results found for requested labs within last 72 hours.    Recent Vancomycin Level(s) for last 3 days  No results found for requested labs within last 72 hours.      Vancomycin IV Administrations (past 72 hours)      No vancomycin orders with administrations in past 72 hours.                Nephrotoxins and other renal medications (Future)    Start     Dose/Rate Route Frequency Ordered Stop    17 2330  vancomycin (VANCOCIN) 1,500 mg in NaCl 0.9 % 250 mL intermittent infusion      1,500 mg Intravenous EVERY 12 HOURS 17 2250            Contrast Orders - past 72 hours     None                Plan:  1.  Start vancomycin  1500 mg IV q12h.   2.  Goal Trough Level: 15-20 mg/L   3.  Pharmacy will check trough levels as appropriate in 1-3 Days.    4. Serum creatinine levels will be ordered daily for the first week of therapy and at least twice weekly for subsequent weeks.    5. [place] method utilized to dose vancomycin therapy: Method 2    Miri Urena, PharmD, BCPS

## 2017-06-29 NOTE — PROGRESS NOTES
IVF NS @ 125ml/hr held d/t pt refusing PIV and requesting a midline. Paged team for order to place midline, midlines are only placed on days, no order placed as of yet. RN not comfortable running IVF through port before results of blood cultures are known.  Pt currently infusing IV vanco from ED through the port, before results of cultures are known, team is aware.

## 2017-06-29 NOTE — ED NOTES
"Triage Assessment & Note:    /78  Pulse 77  Temp 97.9  F (36.6  C) (Oral)  Resp 18  Ht 1.803 m (5' 11\")  Wt 78.9 kg (174 lb)  SpO2 98%  BMI 24.27 kg/m2    Patient presents with: Pt told to come to ER for possible blood cultures and antibiotics.     Home Treatments/Remedies: None    Febrile / Afebrile? Afebrile    Duration of C/o:  3 days     Manjeet Landa  June 28, 2017      "

## 2017-06-30 ENCOUNTER — HOME INFUSION (PRE-WILLOW HOME INFUSION) (OUTPATIENT)
Dept: PHARMACY | Facility: CLINIC | Age: 47
End: 2017-06-30

## 2017-06-30 LAB
ANION GAP SERPL CALCULATED.3IONS-SCNC: 6 MMOL/L (ref 3–14)
BACTERIA SPEC CULT: ABNORMAL
BUN SERPL-MCNC: 8 MG/DL (ref 7–30)
CALCIUM SERPL-MCNC: 8.5 MG/DL (ref 8.5–10.1)
CHLORIDE SERPL-SCNC: 108 MMOL/L (ref 94–109)
CO2 SERPL-SCNC: 27 MMOL/L (ref 20–32)
CREAT SERPL-MCNC: 0.69 MG/DL (ref 0.66–1.25)
ERYTHROCYTE [DISTWIDTH] IN BLOOD BY AUTOMATED COUNT: 14.7 % (ref 10–15)
GFR SERPL CREATININE-BSD FRML MDRD: ABNORMAL ML/MIN/1.7M2
GLUCOSE SERPL-MCNC: 108 MG/DL (ref 70–99)
HCT VFR BLD AUTO: 34.2 % (ref 40–53)
HGB BLD-MCNC: 11 G/DL (ref 13.3–17.7)
Lab: ABNORMAL
MCH RBC QN AUTO: 29.1 PG (ref 26.5–33)
MCHC RBC AUTO-ENTMCNC: 32.2 G/DL (ref 31.5–36.5)
MCV RBC AUTO: 91 FL (ref 78–100)
MICRO REPORT STATUS: ABNORMAL
MICROORGANISM SPEC CULT: ABNORMAL
PLATELET # BLD AUTO: 175 10E9/L (ref 150–450)
POTASSIUM SERPL-SCNC: 3.6 MMOL/L (ref 3.4–5.3)
RBC # BLD AUTO: 3.78 10E12/L (ref 4.4–5.9)
SODIUM SERPL-SCNC: 141 MMOL/L (ref 133–144)
SPECIMEN SOURCE: ABNORMAL
WBC # BLD AUTO: 5.7 10E9/L (ref 4–11)

## 2017-06-30 PROCEDURE — 25000125 ZZHC RX 250: Performed by: STUDENT IN AN ORGANIZED HEALTH CARE EDUCATION/TRAINING PROGRAM

## 2017-06-30 PROCEDURE — 99238 HOSP IP/OBS DSCHRG MGMT 30/<: CPT | Mod: GC | Performed by: INTERNAL MEDICINE

## 2017-06-30 PROCEDURE — 85027 COMPLETE CBC AUTOMATED: CPT | Performed by: INTERNAL MEDICINE

## 2017-06-30 PROCEDURE — 25000128 H RX IP 250 OP 636: Performed by: STUDENT IN AN ORGANIZED HEALTH CARE EDUCATION/TRAINING PROGRAM

## 2017-06-30 PROCEDURE — 36592 COLLECT BLOOD FROM PICC: CPT | Performed by: INTERNAL MEDICINE

## 2017-06-30 PROCEDURE — 25000132 ZZH RX MED GY IP 250 OP 250 PS 637: Performed by: STUDENT IN AN ORGANIZED HEALTH CARE EDUCATION/TRAINING PROGRAM

## 2017-06-30 PROCEDURE — 80048 BASIC METABOLIC PNL TOTAL CA: CPT | Performed by: INTERNAL MEDICINE

## 2017-06-30 RX ADMIN — DEXTROAMPHETAMINE SACCHARATE, AMPHETAMINE ASPARTATE, DEXTROAMPHETAMINE SULFATE AND AMPHETAMINE SULFATE 20 MG: 2.5; 2.5; 2.5; 2.5 TABLET ORAL at 09:10

## 2017-06-30 RX ADMIN — SUCRALFATE 1 G: 1 SUSPENSION ORAL at 08:31

## 2017-06-30 RX ADMIN — SODIUM CHLORIDE: 9 INJECTION, SOLUTION INTRAVENOUS at 07:55

## 2017-06-30 RX ADMIN — MUPIROCIN: 20 OINTMENT TOPICAL at 08:31

## 2017-06-30 RX ADMIN — OXYCODONE HYDROCHLORIDE 15 MG: 5 SOLUTION ORAL at 00:09

## 2017-06-30 RX ADMIN — SUCRALFATE 1 G: 1 SUSPENSION ORAL at 05:00

## 2017-06-30 RX ADMIN — OXYCODONE HYDROCHLORIDE 15 MG: 5 SOLUTION ORAL at 05:01

## 2017-06-30 RX ADMIN — SUCRALFATE 1 G: 1 SUSPENSION ORAL at 00:09

## 2017-06-30 RX ADMIN — DEXTROAMPHETAMINE SACCHARATE, AMPHETAMINE ASPARTATE, DEXTROAMPHETAMINE SULFATE AND AMPHETAMINE SULFATE 20 MG: 2.5; 2.5; 2.5; 2.5 TABLET ORAL at 00:09

## 2017-06-30 RX ADMIN — DIAZEPAM 5 MG: 5 TABLET ORAL at 00:08

## 2017-06-30 RX ADMIN — ONDANSETRON 4 MG: 4 TABLET, ORALLY DISINTEGRATING ORAL at 02:41

## 2017-06-30 RX ADMIN — Medication 1 MG: at 00:09

## 2017-06-30 RX ADMIN — OXYCODONE HYDROCHLORIDE 15 MG: 5 SOLUTION ORAL at 09:18

## 2017-06-30 NOTE — PLAN OF CARE
Problem: Goal Outcome Summary  Goal: Goal Outcome Summary  Outcome: Improving  Pt is alert and oriented. Up ad vitor. Off the unit most of the shift.C/o R chest pain that radiates through to back but gets good relief from oxycodone and fentanyl patch on R shoulder. Reports urinary hesitancy but this is not new to pt. Eating small snacks. Nausea well controlled with 1 dose of zofran. PEG is clamped.

## 2017-06-30 NOTE — PROGRESS NOTES
This is a snapshot of the patient's [facility] medical record. For complete information or questions call [phone] or In [facility],  Home Infusion ([id number]).  [facility] Number:  [id number]

## 2017-06-30 NOTE — PLAN OF CARE
Pt here w/ bacteremia.  Cooperative, can become agitated concerning meds.  Awake most of shift, leaves unit frequently.  Up independently.  A&Ox4.  Pt not on unit for AM vitals. R chest port WNL, w/NS @ 125ml/hr. G tube, leaking when unclamped, pt states   there is a piece missing from inside , team aware.  Pt allergic to PNCs and Vanco,  IV vanco d/c d.  Voiding WNL, no BM this shift.  15mg Oxycodone q4h given x 2 this shift.  Fentanyl patch on R deltoid.  4mg Zofran given x 1 for c/o nausea.  Port cath cultures still pending, if negative , pt can d/c today.  RN found that pt had been disconnecting IV tubing from port to leave the unit and turning off IV pump, RN educated pt on infection risk, restarted IVF and locked IV pole pad. Continue to monitor and follow POC.

## 2017-06-30 NOTE — PROGRESS NOTES
SUBJECTIVE:                                                    Parker Acevedo Sr is a 46 year old male who presents to clinic today for the following health issues:          Hospital Follow-up Visit:    Patient seen today after being hospitalized for 2 days due to a localized PICC line infection but no sepsis. The PICC line in his right upper extremity was removed. He was given a single dose of vancomycin. No additional antibiotics were given after his blood culture and culture from the port were negative. His resolve the fevers and overall feels well. His port is now being used. He had noted fevers above 100  prior to the PICC line being removed. No ongoing fevers currently.    Hospital/Nursing Home/IP Rehab Facility: [facility]  Date of Admission: 6/28/2017  Date of Discharge: 06/30/2017  Reason(s) for Admission: Positive Blood Culture, Fever            Problems taking medications regularly:  None       Medication changes since discharge: None       Problems adhering to non-medication therapy:  None    Summary of hospitalization:  [facility] discharge summary reviewed  Diagnostic Tests/Treatments reviewed.  Follow up needed: none  Other Healthcare Providers Involved in Patient s Care:         Homecare  Update since discharge: improved.     Post Discharge Medication Reconciliation: discharge medications reconciled, continue medications without change.  Plan of care communicated with patient and family     Coding guidelines for this visit:  Type of Medical   Decision Making Face-to-Face Visit       within 7 Days of discharge Face-to-Face Visit        within 14 days of discharge   Moderate Complexity [id number]   High Complexity [id number]            Anxiety Follow-Up    Status since last visit: No change    Other associated symptoms: Chronic pain therapy prompting a reduction Valium use over time.    Complicating factors:   Significant life event: No   Current substance abuse:  "None  Depression symptoms: No  CIERRA-7 SCORE 4/11/2017 5/9/2017 7/6/2017   Total Score - - -   Total Score - - 3 (minimal anxiety)   Total Score 2 3 3       GAD7            Problem list and histories reviewed & adjusted, as indicated.  Additional history: as documented        Reviewed and updated as needed this visit by clinical staff       Reviewed and updated as needed this visit by Provider         ROS:  CONSTITUTIONAL: No recurring fevers. Relatively stable weight with enteral feedings.  I: NEGATIVE for worrisome rashes, moles or lesions  E: NEGATIVE for vision changes or irritation  E/M: NEGATIVE for ear, mouth and throat problems  R: NEGATIVE for significant cough or SOB  CV: NEGATIVE for chest pain, palpitations or peripheral edema  GI: Continues with chronic abdominal pain postsurgery. No new changes. Continues with recurrent reflux. Mainly bile acid reflux.  : NEGATIVE for frequency, dysuria, or hematuria  M: NEGATIVE for significant arthralgias or myalgia  N: NEGATIVE for weakness, dizziness or paresthesias  E: NEGATIVE for temperature intolerance, skin/hair changes  H: NEGATIVE for bleeding problems  P: NEGATIVE for changes in mood or affect  PSYCHIATRIC: Ongoing care for ADHD with Adderall. Thallium being decreased for chronic anxiety. Will taper off over the next 2 months.     OBJECTIVE:                                                    /80 (BP Location: Left arm, Patient Position: Chair, Cuff Size: Adult Regular)  Pulse 100  Temp 97.9  F (36.6  C) (Temporal)  Ht 5' 11\" (1.803 m)  Wt 195 lb 14.4 oz (88.9 kg)  SpO2 98%  BMI 27.32 kg/m2  Body mass index is 27.32 kg/(m^2).  GENERAL: alert, no distress and cooperative  EYES: Eyes grossly normal to inspection, extraocular movements - intact, and PERRL  HENT: ear canals- normal; TMs- normal; Nose- normal; Mouth- no ulcers, no lesions  NECK: no tenderness, no adenopathy, no asymmetry, no masses, no stiffness; thyroid- normal to palpation  RESP: " lungs clear to auscultation - no rales, no rhonchi, no wheezes  CV: regular rates and rhythm, normal S1 S2, no S3 or S4 and no murmur, no click or rub -  ABDOMEN: soft, no tenderness, no  hepatosplenomegaly, no masses, normal bowel sounds  MS: extremities- no gross deformities noted, no edema  NEURO: strength and tone- normal, sensory exam- grossly normal, mentation- intact, speech- normal, reflexes- symmetric  BACK: no CVA tenderness, no paralumbar tenderness  PSYCH: Alert and oriented times 3; speech- coherent , normal rate and volume; able to articulate logical thoughts, able to abstract reason, no tangential thoughts, no hallucinations or delusions, affect- normal  LYMPHATICS: ant. cervical- normal, post. cervical- normal, axillary- normal, supraclavicular- normal, inguinal- normal    Diagnostic test results:  Diagnostic Test Results:  none      ASSESSMENT/PLAN:                                                    1. PICC line infection, subsequent encounter   Appears to have resolved with removal of the PICC line from the right upper extremity in a single dose of Vancomycin.    2. Chronic anxiety   Tapering down to Valium 5 mg at night for the next 1 month. Been reduced to 2.5 mg Valium per day for the month of August. Likely discontinue at that time but could consider alternate day therapy for 2-4 weeks.  - diazepam (VALIUM) 5 MG tablet; Take 1 tablet (5 mg) by mouth daily as needed for anxiety or sleep  Dispense: 30 tablet; Refill: 1    3. Attention deficit hyperactivity disorder (ADHD), predominantly inattentive type   Continue current medications and follow-up in 3 weeks for rewriting prescriptions.      Follow up with Provider - Follow-up in 3-4 weeks as planned.   See Patient Instructions    The patient understood the rational for the diagnosis and treatment plan. All questions were answered to best of my ability and the patient's satisfaction.      Esteban Daly MD  [facility] for  HPI/ROS submitted by the patient on 7/6/2017   If you checked off any problems, how difficult have these problems made it for you to do your work, take care of things at home, or get along with other people?: Somewhat difficult  PHQ9 TOTAL SCORE: 5  CIERRA 7 TOTAL SCORE: 3

## 2017-06-30 NOTE — PLAN OF CARE
Problem: Goal Outcome Summary  Goal: Goal Outcome Summary  Outcome: Adequate for Discharge Date Met:  06/30/17  A&Ox4. VSS on RA. Pt adequate for DC, no growth in port line so far. Left port accessed, home infusion not able to come out to home tonight, left accessed so pt could start TPN this evening (accessed on 6/28). Pt adequate for DC. Discharge instructions gone over. Pt left unit for home at 1030.

## 2017-06-30 NOTE — DISCHARGE SUMMARY
[facility] Medicine  Discharge Summary    Date of Admission:  6/28/2017  Date of Discharge:  6/30/2017 10:16 AM  Discharging Provider: Fifi Jose MD  Date of Service (when I saw the patient): 06/30/17    Discharge Diagnoses   1. PICC line colonization  2. Chest wall tenderness  3. Gastric bypass  4. Malnutrition  History of Present Illness   Parker Acevedo Sr is an 46 year old male with hx of gastric bypass complicated by severe malnutrition/short gut syndrome now on chronic TPN presenting with fevers and positive PICC line cultures.     Hospital Course   Parker Acevedo Sr was admitted on 6/28/2017.  The following problems were addressed during his hospitalization:    # Fevers   # Positive PICC line culture  # Hx of port-a-cath hub infection  Patient had PICC line placed 3/23/17 for TPN given concerns for port-a-cath hub infection per ID note on 3/16/17. Blood cultures obtained at that time from port-a-cath were negative. Patient has been having pain around the site of port along with back pain since then. He also notes fevers and chills over the last 3 weeks along with redness at PICC line site. PICC grew Staph epidermidis and salivarius on cultures obtained 6/26, it was  removed 6/27. Patient was empircally started on vancomycin which was discontinued after 1 day. The PICC line was likely colonized and removal was sufficient treatment. Port-a-cath cultures remained negative. Patient remained afebrile and hemodynamically stable while hospitalized. On discharge:    Ok to use port for TPN at this time    PCP follow up in 1 week for hospital follow up     # Chest wall tenderness  Patient notes chest wall tenderness radiating from left to right and to back, he initially thought this was related to PICC line, however, it persists despite removal. Noted to have tenderness with palpation of chest wall and notes improvement when walking because the muscles are  stretched. He has echo stress test 2/2017 negative for ischemia. He has cardiology appointment on 7/3/17. Per chart review has had pain around port site (R chest) with radiation to back for several months.    Follow up with cardiology as scheduled    Follow up with PCP in 1 week     # Gastric bypass c/b short gut syndrome now s/p G tube  # Malnutrition  - Ok to re-start TPN through Port  - Small snacks per diet at home  - Discuss G tube replacement with PCP    # Chronic normocytic anemia  Currently stable     # ADHD  - Continue home Raudel Jose  PGY2 Internal Medicine  [id number]    Significant Results and Procedures   Results for orders placed or performed during the hospital encounter of 06/28/17   Chest XR,  PA & LAT    Narrative    Examination: XR CHEST 2 VW, 6/28/2017 10:17 PM    Comparison: 5/9/2017    History: fever, eval for infiltrate    Findings: Right IJ port tip at the level of the low SVC.  Cardiomediastinal silhouette is within normal limits. Stable right  infrahilar opacities. Costophrenic angles are sharp. No pneumothorax.      Impression    Impression: Stable right infrahilar opacities. No acute findings.    I have personally reviewed the examination and initial interpretation  and I agree with the findings.    BLU OZUNA MD     *Note: Due to a large number of results and/or encounters for the requested time period, some results have not been displayed. A complete set of results can be found in Results Review.       Pending Results   These results will be followed up by Esteban Daly    Unresulted Labs Ordered in the Past 30 Days of this Admission     Date and Time Order Name Status Description    6/28/2017 2154 Blood culture Preliminary     6/28/2017 2154 Blood culture Preliminary     6/26/2017 1830 BLOOD CULTURE Preliminary           Code Status   Full Code    Physical Exam   Temp: 98  F (36.7  C) Temp src: Oral BP: 114/77 Pulse: 80   Resp: 16 SpO2: 98 % O2 Device: None (Room  air)    Vitals:    06/28/17 2043 06/29/17 0029 06/29/17 0747   Weight: 78.9 kg (174 lb) 91.9 kg (202 lb 9.6 oz) 91.9 kg (202 lb 8 oz)     Vital Signs with Ranges  Temp:  [98  F (36.7  C)] 98  F (36.7  C)  Pulse:  [80] 80  Resp:  [16] 16  BP: (114)/(77) 114/77  SpO2:  [98 %] 98 %  I/O last 3 completed shifts:  In: 1051.25 [I.V.:1051.25]  Out: -     Constitutional: NAD. Sitting up in bed.  HEENT: NCAT. Normal conjunctiva. No scleral icterus.  Respiratory: Non-labored breathing, good air exchange, lungs clear to auscultation bilaterally. No cough or wheeze noted.   Cardiovascular: Regular rate and rhythm. No murmur or rub. Tenderness to palpation over R chest wall.  GI: Normoactive bowel sounds. Abdomen soft, non-distended, and non-tender. No palpable masses or organomegaly.  Skin: Warm and dry. No concerning lesions or rash on exposed surfaces. Port site appears C/D/I.  Musculoskeletal: Extremities grossly normal, non-tender, no edema.   Neurologic: A&O x 3, CNs 2-12 grossly intact, speech normal    Discharge Disposition   Discharged to home  Condition at discharge: Stable    Follow-up Labs/Appointments:    Consultations This Hospital Stay   PHARMACY TO DOSE VANCO  VASCULAR ACCESS CARE ADULT IP CONSULT  VASCULAR ACCESS ADULT IP CONSULT  VASCULAR ACCESS ADULT IP CONSULT    Discharge Orders     Home infusion referral     Reason for your hospital stay   You were admitted for blood culture from your PICC growing bacteria. This was likely colonization and it was removed. Cultures from your port remained negative and it should be ok to use at this time for TPN.     Adult [facility] Follow-up and recommended labs and tests   Follow up with primary care provider, Esteban Daly, within 7 days for hospital follow- up.  The following labs/tests are recommended: BMP and CBC.    Appointments on [place] and/or [facility] (with [facility] or [facility] provider or service). Call [phone] if you haven't heard regarding these  appointments within 7 days of discharge.     Activity   Your activity upon discharge: activity as tolerated     When to contact your care team   Call your primary doctor if you have any of the following: fevers, chills, N/V.     Discharge Instructions   1. Ok to use port for TPN  2. Follow up with PCP in 1 week due to hospitalization     Diet   Follow this diet upon discharge: Orders Placed This Encounter     Regular Diet Adult       Discharge Medications   Discharge Medication List as of 6/30/2017  9:59 AM      CONTINUE these medications which have NOT CHANGED    Details   sucralfate (CARAFATE) 1 GM/10ML suspension Take 10 mLs (1 g) by mouth 4 times daily, Disp-1200 mL, R-11, E-Prescribe      cyanocobalamin (VITAMIN B12) 1000 MCG/ML injection Inject 1 mL (1,000 mcg) into the muscle every 30 days, Disp-1 mL, R-11, E-Prescribe      morphine 0.1% in intrasite topical gel Apply as needed prior to accessing the port site., Disp-100 g, R-0, Local Print      oxyCODONE (ROXICODONE) 5 MG/5ML solution Take 10-15 mLs (10-15 mg) by mouth every 4 hours as needed for moderate to severe pain Max of 45mg per day. Fill on/after 6/23/17 not to start till 6/24/17., Disp-1350 mL, R-0, Local Print      fentaNYL (DURAGESIC) 50 mcg/hr 72 hr patch Place 1 patch onto the skin every 48 hours MYLAN BRAND ONLY. Fill on/after 6/23/17 to start on/after 6/25/17, Disp-15 patch, R-0, Local Print      !! amphetamine-dextroamphetamine (ADDERALL) 20 MG per tablet Take 1 tablet (20 mg) by mouth 2 times daily, Disp-60 tablet, R-0, Local Print      ondansetron (ZOFRAN-ODT) 8 MG ODT tab Take 1 tablet (8 mg) by mouth every 8 hours as needed for nausea, Disp-90 tablet, R-3, E-Prescribe      vitamin D (ERGOCALCIFEROL) 80909 UNIT capsule Take 1 capsule (50,000 Units) by mouth every 7 days, Disp-12 capsule, R-3, E-Prescribe      diazepam (VALIUM) 5 MG tablet Take 1 tablet (5 mg) by mouth daily as needed for anxiety or sleep, Disp-30 tablet, R-2, No Print Out  "     albuterol (PROAIR HFA/PROVENTIL HFA/VENTOLIN HFA) 108 (90 BASE) MCG/ACT Inhaler Inhale 2 puffs into the lungs every 4 hours as needed for shortness of breath / dyspnea or wheezing, Disp-1 Inhaler, R-3, E-Prescribe      !! amphetamine-dextroamphetamine (ADDERALL) 20 MG per tablet Take 1 tablet (20 mg) by mouth 2 times daily, Disp-60 tablet, R-0, Local Print      !! amphetamine-dextroamphetamine (ADDERALL) 20 MG per tablet Take 1 tablet (20 mg) by mouth 2 times daily, Disp-60 tablet, R-0, Local Print      !! amphetamine-dextroamphetamine (ADDERALL) 20 MG per tablet Take 1 tablet (20 mg) by mouth 2 times daily, Disp-60 tablet, R-0, Local Print      senna-docusate (SENOKOT-S;PERICOLACE) 8.6-50 MG per tablet Take 1-2 tablets by mouth 2 times daily as needed for constipation, Disp-120 tablet, R-11, E-Prescribe      Needle, Disp, (BD DISP NEEDLES) 27G X 1/2\" MISC 1 Device every 30 days Use for cyanocobalamin injection once q 30 days., Disp-3 each, R-4, E-Prescribe      lidocaine-prilocaine (EMLA) cream Apply topically as needed for moderate painDisp-30 g, [id number]      !! [facility] MANAGED PATIENT Contact [facility] for patient specific medication information at [phone] on admission and discharge from the hospital.  Phones are answered 24 hours a day 7 days a week 365 days a year.    Providers - Choose \"CONTINUE HOME MED (no scr ipt)\" at discharge if patient treatment with home infusion will continue., No Print OutContinue home infusion      mupirocin (BACTROBAN) 2 % ointment Apply topically 3 times dailyDisp-30 g, [id number]      nystatin-triamcinolone (MYCOLOG II) cream Apply topically 2 times daily as neededDisp-60 g, [id number]      !! order for DME Equipment being ordered: Bilateral knee high chronic venous insufficiency stockings--  mild-moderate pressures.Disp-4 each, R-5, Local Print      !! [facility] PATIENT Contact [facility] for " "patient specific medication information at [phone] on admission and discharge from the hospital.  Phones are answered 24 hours a day 7 days a week 365 days a year.    Providers - Choose \"CONTINUE HOME MED (no scr ipt)\" at discharge if patient treatment with home infusion will continue., No Print OutResume prior to admission TPN/Lipids/saline      !! order for DME Injection Supplies for Vitamin B12: 3cc syringes w/ 27 gauge needles, 1/2 inch lengthDisp-12 each, R-0, Local Print       !! - Potential duplicate medications found. Please discuss with provider.        Allergies   Allergies   Allergen Reactions     Bactrim [Sulfamethoxazole W/Trimethoprim] Rash     Penicillins Anaphylaxis     Doxycycline Rash     Vancomycin Rash     Rash after receiving vancomycin 3/28/16 (red man's?). Tolerated with slower infusion and diphenhydramine premed.     Data   CBC  Recent Labs  Lab 06/30/17  0733 06/29/17  1009 06/28/17  2222   WBC 5.7 5.8 5.8   RBC 3.78* 3.71* 3.63*   HGB 11.0* 10.9* 10.8*   HCT 34.2* 33.7* 33.0*   MCV 91 91 91   MCH 29.1 29.4 29.8   MCHC 32.2 32.3 32.7   RDW 14.7 14.7 14.6    139* 136*     CMP  Recent Labs  Lab 06/30/17  0733 06/28/17  2222    138   POTASSIUM 3.6 3.6   CHLORIDE 108 106   CO2 27 29   ANIONGAP 6 4   * 88   BUN 8 10   CR 0.69 0.79   GFRESTIMATED >90Non  GFR Calc >90Non  GFR Calc   GFRESTBLACK >90African American GFR Calc >90African American GFR Calc   SILAS 8.5 8.1*   PROTTOTAL  --  6.6*   ALBUMIN  --  3.2*   BILITOTAL  --  0.3   ALKPHOS  --  77   AST  --  15   ALT  --  21     INRNo lab results found in last 7 days.    "

## 2017-07-01 ENCOUNTER — HOME INFUSION (PRE-WILLOW HOME INFUSION) (OUTPATIENT)
Dept: PHARMACY | Facility: CLINIC | Age: 47
End: 2017-07-01

## 2017-07-01 LAB — INTERPRETATION ECG - MUSE: NORMAL

## 2017-07-03 ENCOUNTER — CARE COORDINATION (OUTPATIENT)
Dept: CARE COORDINATION | Facility: CLINIC | Age: 47
End: 2017-07-03

## 2017-07-03 ENCOUNTER — TELEPHONE (OUTPATIENT)
Dept: FAMILY MEDICINE | Facility: CLINIC | Age: 47
End: 2017-07-03

## 2017-07-03 LAB
BACTERIA SPEC CULT: NO GROWTH
Lab: NORMAL
MICRO REPORT STATUS: NORMAL
SPECIMEN SOURCE: NORMAL

## 2017-07-03 NOTE — TELEPHONE ENCOUNTER
RN to call for hospital follow up:    Reason for follow up: Parker Acevedo Sr appeared on our list for being seen in an Emergency Room or a recent Hospital discharge.    Admitting date: 06/28/2017  Discharge date: 06/30/2017  Location: [facility]  Reason for visit: Positive Blood Culture    Care Coordination: Active. Routing closed encounter to Care Coordination.   Melva Alfredo RN, BSN

## 2017-07-03 NOTE — PROGRESS NOTES
Clinic Care Coordination Contact  [facility]/Voicemail    Referral Source: IP/TCU Report  Clinical Data: Care Coordinator Outreach  Outreach attempted x 1.  Left message on voicemail with call back information and requested return call.  Plan: Care Coordinator mailed out care coordination introduction letter on 4/4/16. Care Coordinator will try to reach patient again in 1-2 business days.    Melissa Behl BSN, RN, PHN  [facility] Care Coordinator  [phone]  [email or web address]

## 2017-07-03 NOTE — TELEPHONE ENCOUNTER
See 7/3/17 care coordination encounter.    Melissa Behl BSN, RN, PHN  [facility] Care Coordinator  [phone]  [email or web address]

## 2017-07-03 NOTE — PROGRESS NOTES
Patient has clinic visit today 7/3 so no post DC follow up call is needed          Jul 03, 2017 11:00 AM CDT   New Visit with Eduardo Maynard MD   [facility]     [address] [place] [id number] [phone]

## 2017-07-04 ENCOUNTER — HOME INFUSION (PRE-WILLOW HOME INFUSION) (OUTPATIENT)
Dept: PHARMACY | Facility: CLINIC | Age: 47
End: 2017-07-04

## 2017-07-05 ENCOUNTER — OFFICE VISIT (OUTPATIENT)
Dept: PALLIATIVE MEDICINE | Facility: CLINIC | Age: 47
End: 2017-07-05
Payer: COMMERCIAL

## 2017-07-05 VITALS
BODY MASS INDEX: 27.48 KG/M2 | HEART RATE: 92 BPM | WEIGHT: 197 LBS | DIASTOLIC BLOOD PRESSURE: 78 MMHG | SYSTOLIC BLOOD PRESSURE: 143 MMHG

## 2017-07-05 DIAGNOSIS — K27.9 PEPTIC ULCER DISEASE: ICD-10-CM

## 2017-07-05 DIAGNOSIS — R10.9 CHRONIC ABDOMINAL PAIN: Primary | ICD-10-CM

## 2017-07-05 DIAGNOSIS — Z79.891 ENCOUNTER FOR LONG-TERM OPIATE ANALGESIC USE: ICD-10-CM

## 2017-07-05 DIAGNOSIS — G89.29 CHRONIC ABDOMINAL PAIN: Primary | ICD-10-CM

## 2017-07-05 DIAGNOSIS — G89.4 CHRONIC PAIN SYNDROME: ICD-10-CM

## 2017-07-05 PROCEDURE — 99214 OFFICE O/P EST MOD 30 MIN: CPT | Performed by: PSYCHIATRY & NEUROLOGY

## 2017-07-05 RX ORDER — FENTANYL 50 UG/1
1 PATCH TRANSDERMAL
Qty: 15 PATCH | Refills: 0 | Status: SHIPPED | OUTPATIENT
Start: 2017-07-05 | End: 2017-08-07

## 2017-07-05 RX ORDER — OXYCODONE HCL 5 MG/5 ML
10-15 SOLUTION, ORAL ORAL EVERY 4 HOURS PRN
Qty: 1650 ML | Refills: 0 | Status: SHIPPED | OUTPATIENT
Start: 2017-07-05 | End: 2017-08-07

## 2017-07-05 ASSESSMENT — PAIN SCALES - GENERAL: PAINLEVEL: MODERATE PAIN (5)

## 2017-07-05 NOTE — PATIENT INSTRUCTIONS
1. Continue the oxycodone 15mg 3 times during the day, putting at least 4 hours between the doses.  2. I am allowing another 10mg oxycodone dose in the middle of the night if needed.  Again, there should be about 4 hours between doses.    3. i want you to talk with Dr. Daly about tapering off of the valium.  He may want you to go to the 1/2 tab dose.  Our goal is to be off of the valium.    4. Schedule 2 months    ----------------------------------------------------------------  Nurse Triage line:  [phone]   Call this number with any questions or concerns. You may leave a detailed message anytime. Calls are typically returned Monday through Friday between 8 AM and 4:30 PM. We usually get back to you within 2 business days depending on the issue/request.       Medication refills:    For non-narcotic medications, call your pharmacy directly to request a refill. The pharmacy will contact the [facility] for authorization. Please allow 3-4 days for these refills to be processed.     For narcotic refills, call the nurse triage line or send a [employer] message. Please contact us 7-10 days before your refill is due. The message MUST include the name of the specific medication(s) requested and how you would like to receive the prescription(s). The options are as follows:    Pain Clinic staff can mail the prescription to your pharmacy. Please tell us the name of the pharmacy.    You may pick the prescription up at the Pain Clinic (tell us the location) or during a clinic visit with your pain provider    Pain Clinic staff can deliver the prescription to the [place] pharmacy in the clinic building. Please tell us the location.      Scheduling number: [phone].  Call this number to schedule or change appointments.    We believe regular attendance is key to your success in our program.    Any time you are unable to keep your appointment we ask that you call us at least 24 hours in advance to let us know.  This will allow us to offer the appointment time to another patient.

## 2017-07-05 NOTE — NURSING NOTE
"Chief Complaint   Patient presents with     Pain     Abdominal pain        Initial /78  Pulse 92  Wt 89.4 kg (197 lb)  BMI 27.48 kg/m2 Estimated body mass index is 27.48 kg/(m^2) as calculated from the following:    Height as of 6/28/17: 1.803 m (5' 11\").    Weight as of this encounter: 89.4 kg (197 lb).  Medication Reconciliation: complete     Lyndsay Solo MA      "

## 2017-07-05 NOTE — PROGRESS NOTES
[facility]    Date of visit: 7/5/2017      Chief complaint:    Chief Complaint   Patient presents with     Pain     Abdominal pain        Interval history:  Parker Acevedo was last seen by me on 4/5/17    Recommendations/plan at the last visit included:  1. Again, discussed smoking cessation goals.  Decreased from 1/2 pack/day to 2-3 cigarettes/day.  He did contact [employer] and was set up with a  telephone counselor and nicotine patches. Continue current plan for cessation. Reports he has had increased stress and has not reduced his use from the last appt.    2. No changes to medications- will wait on further wean as he heals from surgery   3. He needs to discuss further tapering of Valium with his PCP. He isn't regularly using 2/day of the valium, so is going to try to decrease to 1/day  4. He has naloxone prescription from a previous hospitalization.  Advised him to let me know when expires..   5. Follow up 3 months    Since his last visit, Parker Acevedo reports:  -Continued problems with infections and issues with port.  - no new concerns with neck.  -continues on valium, willing to consider coming down.  -has been trying to work on posture/activity, but this has been hard  -nutrition continues to be a big issue.    Pain scores:  Pain intensity  5-6/10    Current pain treatments:   Oxycodone 10-15 mg liquid by G tube every 8 hours, total of 35-45 mg daily  Fentanyl 50mcg/hr patch q48 hours  Lidocaine patches  Valium 5 mg/day, 1 tab in AM and PM- not every day  Naloxone- has from previous hospitalization.    Previous medication treatments included:   Tylenol #3   Vicodin   Tramadol   Diazepam- for sleep/anxiety  Gabapentin- bad headaches, acid reflux  Oxycodone max of 45mg/day.      Side Effects: no side effects    Medications:  Current Outpatient Prescriptions   Medication Sig Dispense Refill     oxyCODONE (ROXICODONE) 5 MG/5ML solution Take  "10-15 mLs (10-15 mg) by mouth every 4 hours as needed for moderate to severe pain Max of 55mg per day. Fill on/after 7/19/17 not to start till 7/21/17. Early fill due to change in dose 1650 mL 0     fentaNYL (DURAGESIC) 50 mcg/hr 72 hr patch Place 1 patch onto the skin every 48 hours MYLAN BRAND ONLY. Fill on/after 7/21/17 to start on/after 7/24/17 15 patch 0     Needle, Disp, (BD DISP NEEDLES) 27G X 1/2\" MISC 1 Device every 30 days Use for cyanocobalamin injection once q 30 days. 3 each 4     sucralfate (CARAFATE) 1 GM/10ML suspension Take 10 mLs (1 g) by mouth 4 times daily 1200 mL 11     morphine 0.1% in intrasite topical gel Apply as needed prior to accessing the port site. 100 g 0     vitamin D (ERGOCALCIFEROL) 14981 UNIT capsule Take 1 capsule (50,000 Units) by mouth every 7 days 12 capsule 3     albuterol (PROAIR HFA/PROVENTIL HFA/VENTOLIN HFA) 108 (90 BASE) MCG/ACT Inhaler Inhale 2 puffs into the lungs every 4 hours as needed for shortness of breath / dyspnea or wheezing 1 Inhaler 3     [facility] PATIENT Contact [facility] for patient specific medication information at [phone] on admission and discharge from the hospital.  Phones are answered 24 hours a day 7 days a week 365 days a year.    Providers - Choose \"CONTINUE HOME MED (no script)\" at discharge if patient treatment with home infusion will continue.       mupirocin (BACTROBAN) 2 % ointment Apply topically 3 times daily 30 g 0     nystatin-triamcinolone (MYCOLOG II) cream Apply topically 2 times daily as needed 60 g 5     order for DME Equipment being ordered: Bilateral knee high chronic venous insufficiency stockings--  mild-moderate pressures. 4 each 5     [facility] Contact [facility] for patient specific medication information at [phone] on admission and discharge from the hospital.  Phones are answered 24 hours a day 7 days a week 365 days a year.    Providers " "- Choose \"CONTINUE HOME MED (no script)\" at discharge if patient treatment with home infusion will continue.       senna-docusate (SENOKOT-S;PERICOLACE) 8.6-50 MG per tablet Take 1-2 tablets by mouth 2 times daily as needed for constipation 120 tablet 11     order for DME Injection Supplies for Vitamin B12: 3cc syringes w/ 27 gauge needles, 1/2 inch length 12 each 0     ondansetron (ZOFRAN-ODT) 8 MG ODT tab Take 1 tablet (8 mg) by mouth every 8 hours as needed for nausea 90 tablet 3     cyanocobalamin (VITAMIN B12) 1000 MCG/ML injection Inject 1 mL (1,000 mcg) into the muscle every 30 days 1 mL 11     lidocaine-prilocaine (EMLA) cream Apply topically as needed for moderate pain 30 g 11     [START ON 10/2/2017] amphetamine-dextroamphetamine (ADDERALL) 20 MG per tablet Take 1 tablet (20 mg) by mouth 2 times daily 60 tablet 0     [START ON 9/1/2017] amphetamine-dextroamphetamine (ADDERALL) 20 MG per tablet Take 1 tablet (20 mg) by mouth 2 times daily 60 tablet 0     amphetamine-dextroamphetamine (ADDERALL) 20 MG per tablet Take 1 tablet (20 mg) by mouth 2 times daily 60 tablet 0     diazepam (VALIUM) 5 MG tablet Take 0.5 tablets (2.5 mg) by mouth daily as needed for anxiety or sleep 30 tablet 0     carvedilol (COREG) 3.125 MG tablet Take 1 tablet (3.125 mg) by mouth 2 times daily (with meals) 60 tablet 3     [DISCONTINUED] NO ACTIVE MEDICATIONS . 0 0       Medical History: any changes in medical history since they were last seen? PICC line infection    Review of Systems:  The 14 system ROS was reviewed from the intake questionnaire, and is positive for: visoin changes, nosebleeds, diarrhea, n/v, abdominal pain  Any bowel or bladder problems: diarrhea, bladder normal  Mood: Baseline, with anxiety    Physical Exam:  Blood pressure 143/78, pulse 92, weight 89.4 kg (197 lb).  General: awake, alert, C collar in place  Gait: Slow  MSK exam: hunched posture  Port site with limited redness/swelling.      THE 4 A's OF OPIOID " MAINTENANCE ANALGESIA    Analgesia: good    Activity: on disability    Adverse effects: none    Adherence to Rx protocol: good- [place] Prescription Monitoring Program checked 4/6/17- appropriate      Assessment:   1. Chronic abdominal pain, with history of gastric bypass and later peptic ulcer   2. G tube placement- only used for venting  3. Myofascial abdominal pain, with significant guarding and poor posture  4. Opioid tolerance  5. Anxiety  6. Foot pain with tendonous injury- healed  7. Left arm weakness, consistent with radial nerve injury- improving  8. Port placement- recurrent infections        Plan:   1. Continue oxycodone 15mg TID prn, putting at last 4 hours between doses.  He has had so many various issues coming up that weaning is not working well.  2. He is going to work on further weaning of the valium with Dr. Montalvo. Goal would be to come off.  3. He has naloxone prescription from a previous hospitalization.    4. UDS and opioid agreement  5. Follow up 2 months    Total time spent was 30 minutes, and more than 50% of face to face time was spent in counseling and/or coordination of care regarding medication, medication safety.   Jessica Milligan MD  [facility]

## 2017-07-05 NOTE — MR AVS SNAPSHOT
After Visit Summary   7/5/2017    Parker Acevedo Sr    MRN: [id number]           Patient Information     Date Of Birth          1970        Visit Information        Provider Department      7/5/2017 2:30 PM Patti Milligan MD [facility]        Today's Diagnoses     Encounter for long-term opiate analgesic use        Chronic abdominal pain          Care Instructions    1. Continue the oxycodone 15mg 3 times during the day, putting at least 4 hours between the doses.  2. I am allowing another 10mg oxycodone dose in the middle of the night if needed.  Again, there should be about 4 hours between doses.    3. i want you to talk with Dr. Daly about tapering off of the valium.  He may want you to go to the 1/2 tab dose.  Our goal is to be off of the valium.    4. Schedule 2 months    ----------------------------------------------------------------  Nurse Triage line:  [phone]   Call this number with any questions or concerns. You may leave a detailed message anytime. Calls are typically returned Monday through Friday between 8 AM and 4:30 PM. We usually get back to you within 2 business days depending on the issue/request.       Medication refills:    For non-narcotic medications, call your pharmacy directly to request a refill. The pharmacy will contact the [facility] for authorization. Please allow 3-4 days for these refills to be processed.     For narcotic refills, call the nurse triage line or send a [employer] message. Please contact us 7-10 days before your refill is due. The message MUST include the name of the specific medication(s) requested and how you would like to receive the prescription(s). The options are as follows:    Pain Clinic staff can mail the prescription to your pharmacy. Please tell us the name of the pharmacy.    You may pick the prescription up at the Pain Clinic (tell us the location) or during a clinic visit with your pain  provider    Pain Clinic staff can deliver the prescription to the [facility] pharmacy in the clinic building. Please tell us the location.      Scheduling number: [phone].  Call this number to schedule or change appointments.    We believe regular attendance is key to your success in our program.    Any time you are unable to keep your appointment we ask that you call us at least 24 hours in advance to let us know. This will allow us to offer the appointment time to another patient.               Follow-ups after your visit        Your next 10 appointments already scheduled     Jul 06, 2017 11:20 AM CDT   Office Visit with Esteban Daly MD   [facility]    [address] [address] [id number] [phone]           Bring a current list of meds and any records pertaining to this visit.  For Physicals, please bring immunization records and any forms needing to be filled out.  Please arrive 10 minutes early to complete paperwork.            Jul 24, 2017  2:00 PM CDT   New Visit with Denis Cancino MD   [facility]    [address] [place] [id number]   [phone]            Aug 01, 2017 11:00 AM CDT   Office Visit with Esteban Daly MD   [facility]    [address] [address] [id number] [phone]           Bring a current list of meds and any records pertaining to this visit.  For Physicals, please bring immunization records and any forms needing to be filled out.  Please arrive 10 minutes early to complete paperwork.              Who to contact     If you have questions or need follow up information about today's clinic visit or your schedule please contact [facility] directly at [phone].  Normal or non-critical lab and imaging results will be communicated to you by MyChart, letter or phone within 4 business  days after the clinic has received the results. If you do not hear from us within 7 days, please contact the clinic through [employer] or phone. If you have a critical or abnormal lab result, we will notify you by phone as soon as possible.  Submit refill requests through [employer] or call your pharmacy and they will forward the refill request to us. Please allow 3 business days for your refill to be completed.          Additional Information About Your Visit        [employer] Information     [employer] gives you secure access to your electronic health record. If you see a primary care provider, you can also send messages to your care team and make appointments. If you have questions, please call your primary care clinic.  If you do not have a primary care provider, please call [phone] and they will assist you.        Care EveryWhere ID     This is your Care EveryWhere ID. This could be used by other organizations to access your [place] medical records  [id number]        Your Vitals Were     Pulse BMI (Body Mass Index)                92 27.48 kg/m2           Blood Pressure from Last 3 Encounters:   07/05/17 143/78   06/29/17 114/77   06/01/17 136/80    Weight from Last 3 Encounters:   07/05/17 89.4 kg (197 lb)   06/29/17 91.9 kg (202 lb 8 oz)   06/01/17 89.2 kg (196 lb 11.2 oz)              Today, you had the following     No orders found for display         Today's Medication Changes          These changes are accurate as of: 7/5/17  2:49 PM.  If you have any questions, ask your nurse or doctor.               These medicines have changed or have updated prescriptions.        Dose/Directions    fentaNYL 50 mcg/hr 72 hr patch   Commonly known as:  DURAGESIC   This may have changed:  additional instructions   Used for:  Chronic abdominal pain        Dose:  1 patch   Place 1 patch onto the skin every 48 hours MYLAN BRAND ONLY. Fill on/after 7/21/17 to start on/after 7/24/17   Quantity:  15 patch   Refills:  0        oxyCODONE 5 MG/5ML solution   Commonly known as:  ROXICODONE   This may have changed:  additional instructions   Used for:  Encounter for long-term opiate analgesic use        Dose:  10-15 mg   Take 10-15 mLs (10-15 mg) by mouth every 4 hours as needed for moderate to severe pain Max of 55mg per day. Fill on/after 7/19/17 not to start till 7/21/17. Early fill due to change in dose   Quantity:  1650 mL   Refills:  0            Where to get your medicines      Some of these will need a paper prescription and others can be bought over the counter.  Ask your nurse if you have questions.     Bring a paper prescription for each of these medications     fentaNYL 50 mcg/hr 72 hr patch    oxyCODONE 5 MG/5ML solution                Primary Care Provider Office Phone # Fax #    Esteban Daly [phone]       [facility] [address] [facility] [address]        Equal Access to Services     KATHRYN GUZMAN AH: Hadii kameron huff hadasho Soomaali, waaxda luqadaha, qaybta kaalmada adeegyada, waxay irenein haydisha dumont . So [facility] [phone].    ATENCIÓN: Si habla español, tiene a hicks disposición servicios gratuitos de asistencia lingüística. Llame al [phone].    We comply with applicable federal civil rights laws and [place] laws. We do not discriminate on the basis of race, color, national origin, age, disability sex, sexual orientation or gender identity.            Thank you!     Thank you for choosing [facility]  for your care. Our goal is always to provide you with excellent care. Hearing back from our patients is one way we can continue to improve our services. Please take a few minutes to complete the written survey that you may receive in the mail after your visit with us. Thank you!             Your Updated Medication List - Protect others around you: Learn how to safely use, store and throw away your medicines at www.disposemymeds.org.          This list is  "accurate as of: 7/5/17  2:49 PM.  Always use your most recent med list.                   Brand Name Dispense Instructions for use Diagnosis    albuterol 108 (90 BASE) MCG/ACT Inhaler    PROAIR HFA/PROVENTIL HFA/VENTOLIN HFA    1 Inhaler    Inhale 2 puffs into the lungs every 4 hours as needed for shortness of breath / dyspnea or wheezing    Aspiration pneumonitis (H)       * amphetamine-dextroamphetamine 20 MG per tablet    ADDERALL    60 tablet    Take 1 tablet (20 mg) by mouth 2 times daily    ADHD (attention deficit hyperactivity disorder), inattentive type       * amphetamine-dextroamphetamine 20 MG per tablet    ADDERALL    60 tablet    Take 1 tablet (20 mg) by mouth 2 times daily    ADHD (attention deficit hyperactivity disorder), inattentive type       * amphetamine-dextroamphetamine 20 MG per tablet    ADDERALL    60 tablet    Take 1 tablet (20 mg) by mouth 2 times daily    ADHD (attention deficit hyperactivity disorder), inattentive type       * amphetamine-dextroamphetamine 20 MG per tablet    ADDERALL    60 tablet    Take 1 tablet (20 mg) by mouth 2 times daily    ADHD (attention deficit hyperactivity disorder), inattentive type       cyanocobalamin 1000 MCG/ML injection    VITAMIN B12    1 mL    Inject 1 mL (1,000 mcg) into the muscle every 30 days    Bariatric surgery status       diazepam 5 MG tablet    VALIUM    30 tablet    Take 1 tablet (5 mg) by mouth daily as needed for anxiety or sleep    Chronic anxiety       * [facility] PATIENT      Contact [facility] for patient specific medication information at [phone] on admission and discharge from the hospital.  Phones are answered 24 hours a day 7 days a week 365 days a year.  Providers - Choose \"CONTINUE HOME MED (no script)\" at discharge if patient treatment with home infusion will continue.    S/P bariatric surgery       * [facility] PATIENT      Contact [facility] for patient " "specific medication information at [phone] on admission and discharge from the hospital.  Phones are answered 24 hours a day 7 days a week 365 days a year.  Providers - Choose \"CONTINUE HOME MED (no script)\" at discharge if patient treatment with home infusion will continue.    Severe malnutrition (H)       fentaNYL 50 mcg/hr 72 hr patch    DURAGESIC    15 patch    Place 1 patch onto the skin every 48 hours MYLAN BRAND ONLY. Fill on/after 7/21/17 to start on/after 7/24/17    Chronic abdominal pain       lidocaine-prilocaine cream    EMLA    30 g    Apply topically as needed for moderate pain    Pain following surgery or procedure       morphine 0.1% in intrasite topical gel     100 g    Apply as needed prior to accessing the port site.    Pain following surgery or procedure       mupirocin 2 % ointment    BACTROBAN    30 g    Apply topically 3 times daily    Skin infection       Needle (Disp) 27G X 1/2\" Misc    BD DISP NEEDLES    3 each    1 Device every 30 days Use for cyanocobalamin injection once q 30 days.    Bariatric surgery status       nystatin-triamcinolone cream    MYCOLOG II    60 g    Apply topically 2 times daily as needed    Skin irritation       ondansetron 8 MG ODT tab    ZOFRAN-ODT    90 tablet    Take 1 tablet (8 mg) by mouth every 8 hours as needed for nausea    Nausea       * order for DME     12 each    Injection Supplies for Vitamin B12: 3cc syringes w/ 27 gauge needles, 1/2 inch length    Bariatric surgery status       * order for DME     4 each    Equipment being ordered: Bilateral knee high chronic venous insufficiency stockings--  mild-moderate pressures.    Bilateral edema of lower extremity       oxyCODONE 5 MG/5ML solution    ROXICODONE    1650 mL    Take 10-15 mLs (10-15 mg) by mouth every 4 hours as needed for moderate to severe pain Max of 55mg per day. Fill on/after 7/19/17 not to start till 7/21/17. Early fill due to change in dose    Encounter for long-term opiate analgesic " use       senna-docusate 8.6-50 MG per tablet    SENOKOT-S;PERICOLACE    120 tablet    Take 1-2 tablets by mouth 2 times daily as needed for constipation    Slow transit constipation       sucralfate 1 GM/10ML suspension    CARAFATE    1200 mL    Take 10 mLs (1 g) by mouth 4 times daily    Nausea       vitamin D 83619 UNIT capsule    ERGOCALCIFEROL    12 capsule    Take 1 capsule (50,000 Units) by mouth every 7 days    Vitamin D deficiency       ZOFRAN PO      Take 8 mg by mouth        * Notice:  This list has 8 medication(s) that are the same as other medications prescribed for you. Read the directions carefully, and ask your doctor or other care provider to review them with you.

## 2017-07-05 NOTE — PROGRESS NOTES
Clinic Care Coordination Contact  [facility]/Voicemail    Referral Source: IP/TCU Report  Clinical Data: Care Coordinator Outreach  Outreach attempted x 2.  Left message on voicemail with call back information and requested return call.  Plan: Care Coordinator mailed out care coordination introduction letter on 4/4/16. Care Coordinator will try to reach patient again in 5-10 business days.    Melissa Behl BSN, RN, PHN  [facility] Care Coordinator  [phone]  [email or web address]

## 2017-07-06 ENCOUNTER — TELEPHONE (OUTPATIENT)
Dept: FAMILY MEDICINE | Facility: CLINIC | Age: 47
End: 2017-07-06

## 2017-07-06 ENCOUNTER — HOME INFUSION (PRE-WILLOW HOME INFUSION) (OUTPATIENT)
Dept: PHARMACY | Facility: CLINIC | Age: 47
End: 2017-07-06

## 2017-07-06 ENCOUNTER — OFFICE VISIT (OUTPATIENT)
Dept: FAMILY MEDICINE | Facility: CLINIC | Age: 47
End: 2017-07-06
Payer: COMMERCIAL

## 2017-07-06 VITALS
DIASTOLIC BLOOD PRESSURE: 80 MMHG | TEMPERATURE: 97.9 F | HEIGHT: 71 IN | OXYGEN SATURATION: 98 % | WEIGHT: 195.9 LBS | BODY MASS INDEX: 27.43 KG/M2 | HEART RATE: 100 BPM | SYSTOLIC BLOOD PRESSURE: 124 MMHG

## 2017-07-06 DIAGNOSIS — T80.219D PICC LINE INFECTION, SUBSEQUENT ENCOUNTER: Primary | ICD-10-CM

## 2017-07-06 DIAGNOSIS — F41.9 CHRONIC ANXIETY: ICD-10-CM

## 2017-07-06 DIAGNOSIS — F90.0 ATTENTION DEFICIT HYPERACTIVITY DISORDER (ADHD), PREDOMINANTLY INATTENTIVE TYPE: ICD-10-CM

## 2017-07-06 PROCEDURE — 99214 OFFICE O/P EST MOD 30 MIN: CPT | Performed by: FAMILY MEDICINE

## 2017-07-06 RX ORDER — DIAZEPAM 5 MG
5 TABLET ORAL DAILY PRN
Qty: 30 TABLET | Refills: 1 | Status: SHIPPED | OUTPATIENT
Start: 2017-07-06 | End: 2017-08-01

## 2017-07-06 ASSESSMENT — PAIN SCALES - GENERAL: PAINLEVEL: MILD PAIN (3)

## 2017-07-06 ASSESSMENT — ANXIETY QUESTIONNAIRES
GAD7 TOTAL SCORE: 3
GAD7 TOTAL SCORE: 3
5. BEING SO RESTLESS THAT IT IS HARD TO SIT STILL: SEVERAL DAYS
GAD7 TOTAL SCORE: 3
4. TROUBLE RELAXING: SEVERAL DAYS
1. FEELING NERVOUS, ANXIOUS, OR ON EDGE: NOT AT ALL
7. FEELING AFRAID AS IF SOMETHING AWFUL MIGHT HAPPEN: NOT AT ALL
7. FEELING AFRAID AS IF SOMETHING AWFUL MIGHT HAPPEN: NOT AT ALL
6. BECOMING EASILY ANNOYED OR IRRITABLE: NOT AT ALL
2. NOT BEING ABLE TO STOP OR CONTROL WORRYING: NOT AT ALL
3. WORRYING TOO MUCH ABOUT DIFFERENT THINGS: SEVERAL DAYS

## 2017-07-06 ASSESSMENT — PATIENT HEALTH QUESTIONNAIRE - PHQ9
SUM OF ALL RESPONSES TO PHQ QUESTIONS 1-9: 5
10. IF YOU CHECKED OFF ANY PROBLEMS, HOW DIFFICULT HAVE THESE PROBLEMS MADE IT FOR YOU TO DO YOUR WORK, TAKE CARE OF THINGS AT HOME, OR GET ALONG WITH OTHER PEOPLE: SOMEWHAT DIFFICULT
SUM OF ALL RESPONSES TO PHQ QUESTIONS 1-9: 5

## 2017-07-06 NOTE — PATIENT INSTRUCTIONS
These are new changes to your current plan of care based on today's visit:    Medications stopped    Medications to be started    Change dose of this medication Reducing Valium to 5 mg at night for the month of July, then reducing to 2.5 mg of Valium nightly for the month of August.    New treatments        Follow up appointments:    1.  FOLLOW UP WITH YOUR PRIMARY CARE PROVIDER: 3 week(s) for follow up as planned    Esteban Daly MD

## 2017-07-06 NOTE — MR AVS SNAPSHOT
After Visit Summary   7/6/2017    Parker Acevedo Sr    MRN: [id number]           Patient Information     Date Of Birth          1970        Visit Information        Provider Department      7/6/2017 11:20 AM Esteban Daly MD [facility] [place]        Care Instructions    These are new changes to your current plan of care based on today's visit:    Medications stopped    Medications to be started    Change dose of this medication Reducing Valium to 5 mg at night for the month of July, then reducing to 2.5 mg of Valium nightly for the month of August.    New treatments        Follow up appointments:    1.  FOLLOW UP WITH YOUR PRIMARY CARE PROVIDER: 3 week(s) for follow up as planned    Esteban Daly MD                Follow-ups after your visit        Follow-up notes from your care team     Return in about 3 weeks (around 7/27/2017).      Your next 10 appointments already scheduled     Jul 24, 2017  2:00 PM CDT   [facility]    [address] [address]   [phone]            Aug 01, 2017 11:00 AM CDT   Office Visit with Esteban Daly MD   [facility] [place] ([facility] [place])    [address], [address]  [place] [id number] [phone]           Bring a current list of meds and any records pertaining to this visit.  For Physicals, please bring immunization records and any forms needing to be filled out.  Please arrive 10 minutes early to complete paperwork.            Sep 18, 2017 11:00 AM CDT   Return Visit with Patti Milligan MD   [facility] [place] ([facility])    [address]  [place] [id number] [phone]              Who to contact     If you have questions or need follow up information about today's clinic visit or your schedule please contact [facility] directly at  "[phone].  Normal or non-critical lab and imaging results will be communicated to you by MyChart, letter or phone within 4 business days after the clinic has received the results. If you do not hear from us within 7 days, please contact the clinic through [employer] or phone. If you have a critical or abnormal lab result, we will notify you by phone as soon as possible.  Submit refill requests through [employer] or call your pharmacy and they will forward the refill request to us. Please allow 3 business days for your refill to be completed.          Additional Information About Your Visit        [employer] Information     [employer] gives you secure access to your electronic health record. If you see a primary care provider, you can also send messages to your care team and make appointments. If you have questions, please call your primary care clinic.  If you do not have a primary care provider, please call [phone] and they will assist you.        Care EveryWhere ID     This is your Care EveryWhere ID. This could be used by other organizations to access your [place] medical records  [id number]        Your Vitals Were     Pulse Temperature Height Pulse Oximetry BMI (Body Mass Index)       100 97.9  F (36.6  C) (Temporal) 5' 11\" (1.803 m) 98% 27.32 kg/m2        Blood Pressure from Last 3 Encounters:   07/06/17 124/80   07/05/17 143/78   06/29/17 114/77    Weight from Last 3 Encounters:   07/06/17 195 lb 14.4 oz (88.9 kg)   07/05/17 197 lb (89.4 kg)   06/29/17 202 lb 8 oz (91.9 kg)              Today, you had the following     No orders found for display       Primary Care Provider Office Phone # Fax #    Esteban Daly [phone]       [facility] [place] [address] [address] [address]        Equal Access to Services     KATHRYN GUZMAN AH: Negra fregosoo Soomaali, waaxda luqadaha, qaybta kaalmada adeegyada, carmela rizvi. So wa " [phone].    ATENCIÓN: Si alexis felipe, tiene a hicks disposición servicios gratuitos de asistencia lingüística. Chu camp [phone].    We comply with applicable federal civil rights laws and [place] laws. We do not discriminate on the basis of race, color, national origin, age, disability sex, sexual orientation or gender identity.            Thank you!     Thank you for choosing [facility]  for your care. Our goal is always to provide you with excellent care. Hearing back from our patients is one way we can continue to improve our services. Please take a few minutes to complete the written survey that you may receive in the mail after your visit with us. Thank you!             Your Updated Medication List - Protect others around you: Learn how to safely use, store and throw away your medicines at www.disposemymeds.org.          This list is accurate as of: 7/6/17 11:34 AM.  Always use your most recent med list.                   Brand Name Dispense Instructions for use Diagnosis    albuterol 108 (90 BASE) MCG/ACT Inhaler    PROAIR HFA/PROVENTIL HFA/VENTOLIN HFA    1 Inhaler    Inhale 2 puffs into the lungs every 4 hours as needed for shortness of breath / dyspnea or wheezing    Aspiration pneumonitis (H)       * amphetamine-dextroamphetamine 20 MG per tablet    ADDERALL    60 tablet    Take 1 tablet (20 mg) by mouth 2 times daily    ADHD (attention deficit hyperactivity disorder), inattentive type       * amphetamine-dextroamphetamine 20 MG per tablet    ADDERALL    60 tablet    Take 1 tablet (20 mg) by mouth 2 times daily    ADHD (attention deficit hyperactivity disorder), inattentive type       * amphetamine-dextroamphetamine 20 MG per tablet    ADDERALL    60 tablet    Take 1 tablet (20 mg) by mouth 2 times daily    ADHD (attention deficit hyperactivity disorder), inattentive type       * amphetamine-dextroamphetamine 20 MG per tablet    ADDERALL    60 tablet    Take 1 tablet (20 mg) by  "mouth 2 times daily    ADHD (attention deficit hyperactivity disorder), inattentive type       cyanocobalamin 1000 MCG/ML injection    VITAMIN B12    1 mL    Inject 1 mL (1,000 mcg) into the muscle every 30 days    Bariatric surgery status       diazepam 5 MG tablet    VALIUM    30 tablet    Take 1 tablet (5 mg) by mouth daily as needed for anxiety or sleep    Chronic anxiety       * [facility] MANAGED PATIENT      Contact [facility] for patient specific medication information at [phone] on admission and discharge from the hospital.  Phones are answered 24 hours a day 7 days a week 365 days a year.  Providers - Choose \"CONTINUE HOME MED (no script)\" at discharge if patient treatment with home infusion will continue.    S/P bariatric surgery       * [facility] MANAGED PATIENT      Contact [facility] for patient specific medication information at [phone] on admission and discharge from the hospital.  Phones are answered 24 hours a day 7 days a week 365 days a year.  Providers - Choose \"CONTINUE HOME MED (no script)\" at discharge if patient treatment with home infusion will continue.    Severe malnutrition (H)       fentaNYL 50 mcg/hr 72 hr patch    DURAGESIC    15 patch    Place 1 patch onto the skin every 48 hours MYLAN BRAND ONLY. Fill on/after 7/21/17 to start on/after 7/24/17    Chronic abdominal pain       lidocaine-prilocaine cream    EMLA    30 g    Apply topically as needed for moderate pain    Pain following surgery or procedure       morphine 0.1% in intrasite topical gel     100 g    Apply as needed prior to accessing the port site.    Pain following surgery or procedure       mupirocin 2 % ointment    BACTROBAN    30 g    Apply topically 3 times daily    Skin infection       Needle (Disp) 27G X 1/2\" Misc    BD DISP NEEDLES    3 each    1 Device every 30 days Use for cyanocobalamin injection once q 30 days.    Bariatric surgery status       " nystatin-triamcinolone cream    MYCOLOG II    60 g    Apply topically 2 times daily as needed    Skin irritation       ondansetron 8 MG ODT tab    ZOFRAN-ODT    90 tablet    Take 1 tablet (8 mg) by mouth every 8 hours as needed for nausea    Nausea       * order for DME     12 each    Injection Supplies for Vitamin B12: 3cc syringes w/ 27 gauge needles, 1/2 inch length    Bariatric surgery status       * order for DME     4 each    Equipment being ordered: Bilateral knee high chronic venous insufficiency stockings--  mild-moderate pressures.    Bilateral edema of lower extremity       oxyCODONE 5 MG/5ML solution    ROXICODONE    1650 mL    Take 10-15 mLs (10-15 mg) by mouth every 4 hours as needed for moderate to severe pain Max of 55mg per day. Fill on/after 7/19/17 not to start till 7/21/17. Early fill due to change in dose    Encounter for long-term opiate analgesic use       senna-docusate 8.6-50 MG per tablet    SENOKOT-S;PERICOLACE    120 tablet    Take 1-2 tablets by mouth 2 times daily as needed for constipation    Slow transit constipation       sucralfate 1 GM/10ML suspension    CARAFATE    1200 mL    Take 10 mLs (1 g) by mouth 4 times daily    Nausea       vitamin D 23777 UNIT capsule    ERGOCALCIFEROL    12 capsule    Take 1 capsule (50,000 Units) by mouth every 7 days    Vitamin D deficiency       ZOFRAN PO      Take 8 mg by mouth        * Notice:  This list has 8 medication(s) that are the same as other medications prescribed for you. Read the directions carefully, and ask your doctor or other care provider to review them with you.

## 2017-07-06 NOTE — TELEPHONE ENCOUNTER
Reason for Call:  Medication or medication refill:    Do you use a [facility]?  Name of the pharmacy and phone number for the current request:  [facility] - [phone]    Name of the medication requested: Valum    Other request: Pt wife calling states went to  prescription and pharmacy indicated it has , therefore need a new one faxed asap.  Rose would like a return call asap once this has been done.    Can we leave a detailed message on this number? YES    Phone number patient can be reached at: Home number on file [phone] (home)    Best Time: asap    Call taken on 2017 at 12:14 PM by Jael Ambrose

## 2017-07-06 NOTE — TELEPHONE ENCOUNTER
Can be informed that the Valium 5 mg prescription has been sent to [facility].    Esteban Daly MD  Please close encounter when call/work completed.

## 2017-07-06 NOTE — NURSING NOTE
"Chief Complaint   Patient presents with     Hospital F/U     Panel Management     Tobacco Cessation, PHQ-9/CIERRA       Initial /80 (BP Location: Left arm, Patient Position: Chair, Cuff Size: Adult Regular)  Pulse 100  Temp 97.9  F (36.6  C) (Temporal)  Ht 5' 11\" (1.803 m)  Wt 195 lb 14.4 oz (88.9 kg)  SpO2 98%  BMI 27.32 kg/m2 Estimated body mass index is 27.32 kg/(m^2) as calculated from the following:    Height as of this encounter: 5' 11\" (1.803 m).    Weight as of this encounter: 195 lb 14.4 oz (88.9 kg).  Medication Reconciliation: complete   Aravind Grimm MA  July 6, 2017      "

## 2017-07-07 ASSESSMENT — PATIENT HEALTH QUESTIONNAIRE - PHQ9: SUM OF ALL RESPONSES TO PHQ QUESTIONS 1-9: 5

## 2017-07-07 ASSESSMENT — ANXIETY QUESTIONNAIRES: GAD7 TOTAL SCORE: 3

## 2017-07-07 NOTE — PROGRESS NOTES
This is a recent snapshot of the patient's [facility] medical record.  For current drug dose and complete information and questions, call [phone] or In [facility] pool, [facility]v home infusion ([id number])  CSN Number:  [id number]

## 2017-07-07 NOTE — PROGRESS NOTES
This is a recent snapshot of the patient's [facility] medical record.  For current drug dose and complete information and questions, call [phone] or In [facility] pool, fv home infusion ([id number])  CSN Number:  [id number]

## 2017-07-08 ENCOUNTER — HOME INFUSION (PRE-WILLOW HOME INFUSION) (OUTPATIENT)
Dept: PHARMACY | Facility: CLINIC | Age: 47
End: 2017-07-08

## 2017-07-10 ENCOUNTER — HOME INFUSION (PRE-WILLOW HOME INFUSION) (OUTPATIENT)
Dept: PHARMACY | Facility: CLINIC | Age: 47
End: 2017-07-10

## 2017-07-10 ENCOUNTER — MYC MEDICAL ADVICE (OUTPATIENT)
Dept: PALLIATIVE MEDICINE | Facility: CLINIC | Age: 47
End: 2017-07-10

## 2017-07-10 NOTE — TELEPHONE ENCOUNTER
Chris from patient Created: 7/10/2017 2:50 PM    Hey we just wanted to let you know that we had to change Troys fentynal patch a day early do to one fell off with the heat and humidity any questions feel free to call us thank you

## 2017-07-11 ENCOUNTER — CARE COORDINATION (OUTPATIENT)
Dept: CARE COORDINATION | Facility: CLINIC | Age: 47
End: 2017-07-11

## 2017-07-11 ENCOUNTER — HOME INFUSION (PRE-WILLOW HOME INFUSION) (OUTPATIENT)
Dept: PHARMACY | Facility: CLINIC | Age: 47
End: 2017-07-11

## 2017-07-11 PROBLEM — M54.12 CERVICAL RADICULOPATHY: Status: RESOLVED | Noted: 2017-01-17 | Resolved: 2017-07-11

## 2017-07-11 LAB
ALBUMIN SERPL-MCNC: 3.6 G/DL (ref 3.4–5)
ALP SERPL-CCNC: 71 U/L (ref 40–150)
ALT SERPL W P-5'-P-CCNC: 22 U/L (ref 0–70)
ANION GAP SERPL CALCULATED.3IONS-SCNC: 9 MMOL/L (ref 3–14)
AST SERPL W P-5'-P-CCNC: 18 U/L (ref 0–45)
BASOPHILS # BLD AUTO: 0 10E9/L (ref 0–0.2)
BASOPHILS NFR BLD AUTO: 0.7 %
BILIRUB DIRECT SERPL-MCNC: 0.1 MG/DL (ref 0–0.2)
BILIRUB SERPL-MCNC: 0.5 MG/DL (ref 0.2–1.3)
BUN SERPL-MCNC: 19 MG/DL (ref 7–30)
CALCIUM SERPL-MCNC: 8.5 MG/DL (ref 8.5–10.1)
CHLORIDE SERPL-SCNC: 111 MMOL/L (ref 94–109)
CO2 SERPL-SCNC: 26 MMOL/L (ref 20–32)
CREAT SERPL-MCNC: 0.69 MG/DL (ref 0.66–1.25)
DIFFERENTIAL METHOD BLD: ABNORMAL
EOSINOPHIL # BLD AUTO: 0.2 10E9/L (ref 0–0.7)
EOSINOPHIL NFR BLD AUTO: 3.6 %
ERYTHROCYTE [DISTWIDTH] IN BLOOD BY AUTOMATED COUNT: 15.1 % (ref 10–15)
GFR SERPL CREATININE-BSD FRML MDRD: ABNORMAL ML/MIN/1.7M2
GLUCOSE SERPL-MCNC: 55 MG/DL (ref 70–99)
HCT VFR BLD AUTO: 34.6 % (ref 40–53)
HGB BLD-MCNC: 10.7 G/DL (ref 13.3–17.7)
IMM GRANULOCYTES # BLD: 0 10E9/L (ref 0–0.4)
IMM GRANULOCYTES NFR BLD: 0 %
LYMPHOCYTES # BLD AUTO: 2.4 10E9/L (ref 0.8–5.3)
LYMPHOCYTES NFR BLD AUTO: 40.5 %
MAGNESIUM SERPL-MCNC: 2.3 MG/DL (ref 1.6–2.3)
MCH RBC QN AUTO: 28.9 PG (ref 26.5–33)
MCHC RBC AUTO-ENTMCNC: 30.9 G/DL (ref 31.5–36.5)
MCV RBC AUTO: 94 FL (ref 78–100)
MONOCYTES # BLD AUTO: 0.5 10E9/L (ref 0–1.3)
MONOCYTES NFR BLD AUTO: 8.4 %
NEUTROPHILS # BLD AUTO: 2.7 10E9/L (ref 1.6–8.3)
NEUTROPHILS NFR BLD AUTO: 46.8 %
NRBC # BLD AUTO: 0 10*3/UL
NRBC BLD AUTO-RTO: 0 /100
PHOSPHATE SERPL-MCNC: 3.5 MG/DL (ref 2.5–4.5)
PLATELET # BLD AUTO: 193 10E9/L (ref 150–450)
POTASSIUM SERPL-SCNC: 3.8 MMOL/L (ref 3.4–5.3)
PREALB SERPL IA-MCNC: 24 MG/DL (ref 15–45)
PROT SERPL-MCNC: 6.8 G/DL (ref 6.8–8.8)
RBC # BLD AUTO: 3.7 10E12/L (ref 4.4–5.9)
SODIUM SERPL-SCNC: 146 MMOL/L (ref 133–144)
TRIGL SERPL-MCNC: 44 MG/DL
WBC # BLD AUTO: 5.9 10E9/L (ref 4–11)

## 2017-07-11 PROCEDURE — 80053 COMPREHEN METABOLIC PANEL: CPT | Performed by: SURGERY

## 2017-07-11 PROCEDURE — 84100 ASSAY OF PHOSPHORUS: CPT | Performed by: SURGERY

## 2017-07-11 PROCEDURE — 84478 ASSAY OF TRIGLYCERIDES: CPT | Performed by: SURGERY

## 2017-07-11 PROCEDURE — 85025 COMPLETE CBC W/AUTO DIFF WBC: CPT | Performed by: SURGERY

## 2017-07-11 PROCEDURE — 83735 ASSAY OF MAGNESIUM: CPT | Performed by: SURGERY

## 2017-07-11 PROCEDURE — 84134 ASSAY OF PREALBUMIN: CPT | Performed by: SURGERY

## 2017-07-11 PROCEDURE — 82248 BILIRUBIN DIRECT: CPT | Performed by: SURGERY

## 2017-07-11 NOTE — PROGRESS NOTES
Patient failed to return after initial evaluation.  Current status is unknown.  Discharge from PT.

## 2017-07-11 NOTE — PROGRESS NOTES
Clinic Care Coordination Contact  OUTREACH    Referral Information:  Referral Source: IP/TCU Report  Reason for Contact: RN CC call to patient and spouse for follow up.  Care Conference: No     Universal Utilization:   ED Visits in last year: 5  Hospital visits in last year: 2  Last PCP appointment: 05/02/17  Missed Appointments: 1  Concerns: yes, high utilization  Multiple Providers or Specialists: yes    Clinical Concerns:  Current Medical Concerns: Patient with history of gastric bypass, short gut syndrome, malnutrition, G-tube placement, port, chronic TPN, history of central line infections.  Current Behavioral Concerns: ADHD, anxiety    Education Provided to patient: RN CC reviewed when to contact the provider for signs/symptoms of infection.   Clinical Pathway Name: None    Medication Management:  Patient's spouse manages patient's medications.  Patient has FHI involvement.     Functional Status:  Mobility Status: Independent  Equipment Currently Used at Home: cane, straight  Transportation: Patient's spouse provides.           Psychosocial:  Current living arrangement:: I live in a private home with spouse  Financial/Insurance: No concerns at this time.  Patient and spouse are searching for a home and are currently renting their home on a month by month basis.  Patient will be going to a hearing for his social security disability on Friday.     Resources and Interventions:  Current Resources: Home Care, Home Infusion; Other (see comment) ([employer] and [employer] resolving rental)        Advanced Care Plans/Directives on file:: Yes        Goals:   Goal 1 Statement: I will call my doctor for signs and symptoms of infection.  Goal 1 Progression Percent: 70%  Goal 1 Progression Date: 07/11/17              Barriers: Patient reluctant to going to the hospital.  Strengths: Patient has FHI involvement and support faraz spouse.  Patient/Caregiver understanding: Patient and spouse verbalize that his  PICC line site has healed since last hospitalization.  No further signs or symptoms of infection.  Patient has followed up with PCP and Pain Clinic as instructed.  Patient and spouse deny any further questions at this time.  RN CC will continue to follow patient on a monthly and as needed basis.  Frequency of Care Coordination: monthly  Upcoming appointment: 07/06/17 (pain)     Plan:   Patient will continue to follow treatment plan as directed and follow up with PCP with concerns ongoing.   Patient will call provider for signs or symptoms of infection.  RN CC will continue to follow patient on a monthly basis and will remain available as needed to patient, family, care team and home care.    Melissa Behl BSN, RN, N  [facility] Care Coordinator  [phone]  [email or web address]

## 2017-07-12 ENCOUNTER — HOME INFUSION (PRE-WILLOW HOME INFUSION) (OUTPATIENT)
Dept: PHARMACY | Facility: CLINIC | Age: 47
End: 2017-07-12

## 2017-07-13 ENCOUNTER — HOME INFUSION (PRE-WILLOW HOME INFUSION) (OUTPATIENT)
Dept: PHARMACY | Facility: CLINIC | Age: 47
End: 2017-07-13

## 2017-07-14 ENCOUNTER — HOME INFUSION (PRE-WILLOW HOME INFUSION) (OUTPATIENT)
Dept: PHARMACY | Facility: CLINIC | Age: 47
End: 2017-07-14

## 2017-07-15 ENCOUNTER — HOME INFUSION (PRE-WILLOW HOME INFUSION) (OUTPATIENT)
Dept: PHARMACY | Facility: CLINIC | Age: 47
End: 2017-07-15

## 2017-07-17 ENCOUNTER — HOME INFUSION (PRE-WILLOW HOME INFUSION) (OUTPATIENT)
Dept: PHARMACY | Facility: CLINIC | Age: 47
End: 2017-07-17

## 2017-07-19 NOTE — PROGRESS NOTES
"  SUBJECTIVE:                                                    Parker Acevedo Sr is a 46 year old male who presents to clinic today for the following health issues:  {Provider please address medication reconciliation discrepancies--rooming staff please delete if no med/rec issues}    Medication Check; Adderall      Duration: ***    Description (location/character/radiation): ***    Intensity:  {mild,moderate,severe:929998}    Accompanying signs and symptoms: ***    History (similar episodes/previous evaluation): {.:058264::\"None\"}    Precipitating or alleviating factors: {.:170443::\"None\"}    Therapies tried and outcome: {.:674312::\"None\"}         {additional problems for provider to add:387045}    Problem list and histories reviewed & adjusted, as indicated.  Additional history: {NONE - AS DOCUMENTED:135872::\"as documented\"}    {HIST REVIEW/ LINKS 2:116984}    Reviewed and updated as needed this visit by clinical staff     Reviewed and updated as needed this visit by Provider         {PROVIDER CHARTING PREFERENCE:514602}    "

## 2017-07-24 ENCOUNTER — OFFICE VISIT (OUTPATIENT)
Dept: CARDIOLOGY | Facility: CLINIC | Age: 47
End: 2017-07-24
Attending: FAMILY MEDICINE
Payer: COMMERCIAL

## 2017-07-24 VITALS
HEIGHT: 71 IN | HEART RATE: 74 BPM | WEIGHT: 174 LBS | DIASTOLIC BLOOD PRESSURE: 68 MMHG | BODY MASS INDEX: 24.36 KG/M2 | OXYGEN SATURATION: 97 % | SYSTOLIC BLOOD PRESSURE: 114 MMHG

## 2017-07-24 DIAGNOSIS — I42.9 CARDIOMYOPATHY, UNSPECIFIED (H): Primary | ICD-10-CM

## 2017-07-24 PROCEDURE — 99204 OFFICE O/P NEW MOD 45 MIN: CPT | Performed by: INTERNAL MEDICINE

## 2017-07-24 RX ORDER — CARVEDILOL 3.12 MG/1
3.12 TABLET ORAL 2 TIMES DAILY WITH MEALS
Qty: 60 TABLET | Refills: 3 | Status: ON HOLD | OUTPATIENT
Start: 2017-07-24 | End: 2017-09-20

## 2017-07-24 NOTE — LETTER
7/24/2017      RE: Parker Acevedo Sr  [address] [facility] [id number]       Dear Colleague,    Thank you for the opportunity to participate in the care of your patient, Parker Acevedo Sr, at the [facility]. Please see a copy of my visit note below.    HPI and Plan:   Mr. Acevedo is a very pleasant 46-year-old gentleman with history off gastric bypass surgery complicated by short gut syndrome now on total parenteral nutrition for last several years, recent PICC line infection, tobacco abuse which unfortunately his ongoing, ADHD, history of multivitamin deficiency in the setting of short gut syndrome but now getting supplementation through TPN and regular vitamin B-12 injections. He is establishing cardiology care today as echocardiogram in the past have shown mildly reduced LV systolic function. He is accompanied in the clinic by his wife. He had an echocardiogram done in February 2017 which was a dobutamine stress echocardiogram that at rest showed mildly reduced LV systolic function with mild global hypokinesia with dobutamine LV systolic function increase in size decrease without showing any evidence of inducible ischemia. He also had an echocardiogram done in January 10 16 that also showed mildly reduced LV systolic function with LVEF of 45-50%. Prior to that he had an echocardiogram in August 2015 that reported LVEF of 55-60%. All these echocardiogram images were personally reviewed by me and even the echocardiogram in August 2015 appeared to show mildly reduced LV systolic function around 50%. It looks like before the gastric bypass surgery patient weighed more than 500 pounds but now he is weighing close to 174 pounds. He denies any chest discomfort with physical activity and is fairly active climbs stairs walks a lot, no shortness of breath with exertion. He does have intermittent chest discomfort that can worsen  if he moves in a particular way or if he presses on it, he denies any chest discomfort today. Unfortunately he smokes at least half pack per day and has been smoking since age 15 years and in the past had quit successively for 5 years. Patient tells me that he cannot be on any extended release medication like Toprol-XL due to short gut syndrome. He cannot also lie down flat for more than a short duration due to significant bile reflux.      Assessment and plan  A very delightful 46 year gentleman with history of gastric bypass surgery complicated by short gut syndrome and now on TPN, tobacco abuse with echocardiogram over the last 2 years showing mildly reduced LV systolic function with LVEF around 45-50% with dobutamine stress echocardiogram done earlier this year showing increase in LVEF and decrease in LV size without any evidence of inducible ischemia. Patient does not consume any significant amount of alcohol, in the past used to have hypertension which is now well controlled without any medication after the gastric bypass surgery. Fortunately he is asymptomatic from mildly reduced LV systolic function and is not in congestive heart failure and does not appear to have any anginal symptoms. The intermittent chest discomfort sounds noncardiac/musculoskeletal in nature. I had a long discussion with patient and his 5 regarding the echocardiographic findings. Medications like beta blocker and ACE inhibitor have been shown to prevent remodeling/reverse remodeling in systolic congestive heart failure when LVF is less than 40%. Fortunately he does not have any congestive heart failure. I recommend a trial of low-dose beta blocker like carvedilol 3.125 mg b.i.d. Common adverse effect of carvedilol including but not limited to fatigue, dizziness, hypotension were discuss with patient. If he tolerates  low-dose beta blocker we can subsequently consider adding low-dose ACE inhibitor like lisinopril. I recommend follow-up  in 2-3 months at that time depending upon his blood pressure, clinical status we can consider adding low-dose lisinopril. I also strongly recommend patient to quit tobacco or at least cut down, approximately 5 minutes was spent discussing about tobacco cessation.    1. Mildly reduced LV systolic function LVEF around 45-50%. Clinically not in congestive heart failure, nonischemic cardiomyopathy with negative dobutamine stress echocardiogram February 2017.  2. History of gastric bypass surgery  3. Short gut syndrome on TPN  4. Tobacco abuse    Recommendations  Carvedilol 3.125 mg b.i.d.  Tobacco cessation  Follow-up in 2-3 months    Orders Placed This Encounter   Procedures     Follow-Up with Cardiologist       Orders Placed This Encounter   Medications     carvedilol (COREG) 3.125 MG tablet     Sig: Take 1 tablet (3.125 mg) by mouth 2 times daily (with meals)     Dispense:  60 tablet     Refill:  3       There are no discontinued medications.      Encounter Diagnosis   Name Primary?     Cardiomyopathy, unspecified (H) Yes       CURRENT MEDICATIONS:  Current Outpatient Prescriptions   Medication Sig Dispense Refill     carvedilol (COREG) 3.125 MG tablet Take 1 tablet (3.125 mg) by mouth 2 times daily (with meals) 60 tablet 3     diazepam (VALIUM) 5 MG tablet Take 1 tablet (5 mg) by mouth daily as needed for anxiety or sleep 30 tablet 1     Ondansetron HCl (ZOFRAN PO) Take 8 mg by mouth       oxyCODONE (ROXICODONE) 5 MG/5ML solution Take 10-15 mLs (10-15 mg) by mouth every 4 hours as needed for moderate to severe pain Max of 55mg per day. Fill on/after 7/19/17 not to start till 7/21/17. Early fill due to change in dose 1650 mL 0     fentaNYL (DURAGESIC) 50 mcg/hr 72 hr patch Place 1 patch onto the skin every 48 hours MYL[employer] BRAND ONLY. Fill on/after 7/21/17 to start on/after 7/24/17 15 patch 0     sucralfate (CARAFATE) 1 GM/10ML suspension Take 10 mLs (1 g) by mouth 4 times daily 1200 mL 11     cyanocobalamin (VITAMIN  "B12) 1000 MCG/ML injection Inject 1 mL (1,000 mcg) into the muscle every 30 days 1 mL 11     lidocaine-prilocaine (EMLA) cream Apply topically as needed for moderate pain 30 g 11     morphine 0.1% in intrasite topical gel Apply as needed prior to accessing the port site. 100 g 0     amphetamine-dextroamphetamine (ADDERALL) 20 MG per tablet Take 1 tablet (20 mg) by mouth 2 times daily 60 tablet 0     ondansetron (ZOFRAN-ODT) 8 MG ODT tab Take 1 tablet (8 mg) by mouth every 8 hours as needed for nausea 90 tablet 3     vitamin D (ERGOCALCIFEROL) 24217 UNIT capsule Take 1 capsule (50,000 Units) by mouth every 7 days 12 capsule 3     albuterol (PROAIR HFA/PROVENTIL HFA/VENTOLIN HFA) 108 (90 BASE) MCG/ACT Inhaler Inhale 2 puffs into the lungs every 4 hours as needed for shortness of breath / dyspnea or wheezing 1 Inhaler 3     amphetamine-dextroamphetamine (ADDERALL) 20 MG per tablet Take 1 tablet (20 mg) by mouth 2 times daily 60 tablet 0     amphetamine-dextroamphetamine (ADDERALL) 20 MG per tablet Take 1 tablet (20 mg) by mouth 2 times daily 60 tablet 0     [facility]NA[facility]ED PATIENT Contact [facility] for patient specific medication information at [phone] on admission and discharge from the hospital.  Phones are answered 24 hours a day 7 days a week 365 days a year.    Providers - Choose \"CONTINUE HOME MED (no script)\" at discharge if patient treatment with home infusion will continue.       mupirocin (BACTROBAN) 2 % ointment Apply topically 3 times daily 30 g 0     amphetamine-dextroamphetamine (ADDERALL) 20 MG per tablet Take 1 tablet (20 mg) by mouth 2 times daily 60 tablet 0     [facility] PATIENT Contact [facility] for patient specific medication information at [phone] on admission and discharge from the hospital.  Phones are answered 24 hours a day 7 days a week 365 days a year.    Providers - Choose \"CONTINUE HOME MED (no script)\" at " "discharge if patient treatment with home infusion will continue.       senna-docusate (SENOKOT-S;PERICOLACE) 8.6-50 MG per tablet Take 1-2 tablets by mouth 2 times daily as needed for constipation 120 tablet 11     Needle, Disp, (BD DISP NEEDLES) 27G X 1/2\" MISC 1 Device every 30 days Use for cyanocobalamin injection once q 30 days. 3 each 4     nystatin-triamcinolone (MYCOLOG II) cream Apply topically 2 times daily as needed 60 g 5     order for DME Equipment being ordered: Bilateral knee high chronic venous insufficiency stockings--  mild-moderate pressures. 4 each 5     order for DME Injection Supplies for Vitamin B12: 3cc syringes w/ 27 gauge needles, 1/2 inch length 12 each 0     [DISCONTINUED] NO ACTIVE MEDICATIONS . 0 0       ALLERGIES     Allergies   Allergen Reactions     Bactrim [Sulfamethoxazole W/Trimethoprim] Rash     Penicillins Anaphylaxis     Doxycycline Rash     Vancomycin Rash     Rash after receiving vancomycin 3/28/16 (red man's?). Tolerated with slower infusion and diphenhydramine premed.       PAST MEDICAL HISTORY:  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)      Chronic abdominal pain      Difficulty swallowing      Gastric ulcer, unspecified as acute or chronic, without mention of hemorrhage, perforation, or obstruction      Gastro-oesophageal reflux disease      Head injury      Hiatal hernia      Other bladder disorder      Other chronic pain      Severe malnutrition (H)     TPN     Short gut syndrome      Tobacco abuse        PAST SURGICAL HISTORY:  Past Surgical History:   Procedure Laterality Date     APPENDECTOMY       BACK SURGERY  11/3/2014    curve in the spine     BIOPSY LYMPH NODE CERVICAL N/A 2/20/2015    Procedure: BIOPSY LYMPH NODE CERVICAL;  Surgeon: Capo, " MD Baron;  Location: [facility]     C GASTRIC BYPASS,OBESE<100CM SHAYLEE-EN-Y  2002    lost 300 pounds     CHOLECYSTECTOMY       DISCECTOMY, FUSION CERVICAL ANTERIOR ONE LEVEL, COMBINED N/A 2/15/2017    Procedure: COMBINED DISCECTOMY, FUSION CERVICAL ANTERIOR ONE LEVEL;  Surgeon: Darren Campos MD;  Location: [facility]     ENDOSCOPIC INSERTION TUBE GASTROSTOMY  9/9/2013    Procedure: ENDOSCOPIC INSERTION TUBE GASTROSTOMY;;  Surgeon: Francis Vyas MD;  [facility]     ENDOSCOPIC ULTRASOUND UPPER GASTROINTESTINAL TRACT (GI)  4/29/2011    Procedure:ENDOSCOPIC ULTRASOUND UPPER GASTROINTESTINAL TRACT (GI); Both Procedures done Conjointly; Surgeon:NEREIDA HOUSER; [facility]     ENDOSCOPIC ULTRASOUND UPPER GASTROINTESTINAL TRACT (GI)  9/9/2013    Procedure: ENDOSCOPIC ULTRASOUND UPPER GASTROINTESTINAL TRACT (GI);  Endoscopic Ultrasound Guide Gastrostomy Tube Placement  C-arm;  Surgeon: Noe Lizarraga MD;  [facility] OR     ENDOSCOPY  03/25/11    EGD, [facility]     ENDOSCOPY  08/04/09    Upper Endoscopy, [facility]     ENDOSCOPY  01/05/09    Upper Endoscopy, [facility]     ESOPHAGOSCOPY, GASTROSCOPY, DUODENOSCOPY (EGD), COMBINED  4/20/2011    Procedure:COMBINED ESOPHAGOSCOPY, GASTROSCOPY, DUODENOSCOPY (EGD); Surgeon:FRANCIS VYAS; [facility]     ESOPHAGOSCOPY, GASTROSCOPY, DUODENOSCOPY (EGD), COMBINED  6/15/2011    Procedure:COMBINED ESOPHAGOSCOPY, GASTROSCOPY, DUODENOSCOPY (EGD); Surgeon:FRANCIS VYAS; [facility]     ESOPHAGOSCOPY, GASTROSCOPY, DUODENOSCOPY (EGD), COMBINED  6/12/2013    Procedure: COMBINED ESOPHAGOSCOPY, GASTROSCOPY, DUODENOSCOPY (EGD);;  Surgeon: Francis Vyas MD;  [facility]     ESOPHAGOSCOPY, GASTROSCOPY, DUODENOSCOPY (EGD), COMBINED  11/22/2013    Procedure: COMBINED ESOPHAGOSCOPY, GASTROSCOPY, DUODENOSCOPY (EGD);;  Surgeon: Francis Vyas MD;  [facility]     ESOPHAGOSCOPY, GASTROSCOPY, DUODENOSCOPY (EGD),  COMBINED  4/30/2014    Procedure: COMBINED ESOPHAGOSCOPY, GASTROSCOPY, DUODENOSCOPY (EGD);  Surgeon: Francis Vyas MD;  Location:  GI     ESOPHAGOSCOPY, GASTROSCOPY, DUODENOSCOPY (EGD), COMBINED N/A 2/20/2015    Procedure: COMBINED ESOPHAGOSCOPY, GASTROSCOPY, DUODENOSCOPY (EGD), BIOPSY SINGLE OR MULTIPLE;  Surgeon: Baron Scanlon MD;  [facility]     ESOPHAGOSCOPY, GASTROSCOPY, DUODENOSCOPY (EGD), COMBINED N/A 9/30/2015    Procedure: COMBINED ESOPHAGOSCOPY, GASTROSCOPY, DUODENOSCOPY (EGD);  Surgeon: Francis Vyas MD;  Location:  GI     GASTRECTOMY  6/22/2012    Procedure: GASTRECTOMY;  Open Approach, Excise Ulcers,Partial Gastrectomy, Esophagojejunostomy, Hiatal Hernia Repair, Extensive Lysis of Adhesions and Esaphagogastrodudenoscopy.;  Surgeon: Francis Vyas MD;  [facility]     GASTROJEJUNOSTOMY  08/26/09    Extensice enterolysis, partial resect. jejunum, part. resect gastric pouch, gastrojejunostomy anastomosis     HC ESOPH/GAS REFLUX TEST W NASAL IMPED ELECTRODE  8/5/2013    Procedure: ESOPHAGEAL IMPEDENCE FUNCTION TEST 1 HOUR OR LESS;  Surgeon: Halie Lang MD;  Location:  GI     HEAD & NECK SURGERY  2/15/2017    C5-C6     HERNIA REPAIR  2006    Umbilical hernia     HERNIORRHAPHY HIATAL  6/22/2012    Procedure: HERNIORRHAPHY HIATAL;;  Surgeon: Francis Vyas MD;  [facility]     HERNIORRHAPHY INGUINAL  11/22/2013    Procedure: HERNIORRHAPHY INGUINAL;;  Surgeon: Francis Vyas MD;  [facility]     LAPAROTOMY EXPLORATORY  11/22/2013    Procedure: LAPAROTOMY EXPLORATORY;  Exploratory Laparotomy, Upper Endoscopy, Left Inguinal Hernia Repair;  Surgeon: Francis Vyas MD;  [facility]     PICC INSERTION Right 03/16/2017    5fr DL BioFlo PICC, 42cm (3cm external) in the R medial brachial vein w/ tip in the SVC RA junction.     SHAYLEE EN Y BOWEL  2003     SOFT TISSUE SURGERY       SOFT TISSUE SURGERY       TONSILLECTOMY         FAMILY  HISTORY:  Family History   Problem Relation Age of Onset     GASTROINTESTINAL DISEASE Mother      Crohns disease     Anxiety Disorder Mother      Thyroid Disease Mother      Grave's disease     CANCER Father      ear cancer-skin cancer/melanoma     Breast Cancer Maternal Grandmother      DIABETES Maternal Uncle      Breast Cancer Other      Hypertension No family hx of      Hyperlipidemia No family hx of      CEREBROVASCULAR DISEASE No family hx of      Prostate Cancer No family hx of      Depression No family hx of      Anesthesia Reaction No family hx of      Asthma No family hx of      OSTEOPOROSIS No family hx of      Genetic Disorder No family hx of      Obesity No family hx of      MENTAL ILLNESS No family hx of      Substance Abuse No family hx of        SOCIAL HISTORY:  Social History     Social History     Marital status:      Spouse name: Rose     Number of children: 1     Years of education: N/A     Social History Main Topics     Smoking status: Light Tobacco Smoker     Packs/day: 0.10     Years: 3.00     Types: Cigarettes     Smokeless tobacco: Current User      Comment: I use an e cig every now and than     Alcohol use No      Comment: quit      Drug use: No     Sexual activity: Yes     Partners: Female     Birth control/ protection: None      Comment: no protection     Other Topics Concern     Parent/Sibling W/ Cabg, Mi Or Angioplasty Before 65f 55m? No     Social History Narrative       Review of Systems:  Skin:  Negative       Eyes:  Negative      ENT:  Negative      Respiratory:  Positive for dyspnea on exertion;shortness of breath     Cardiovascular:  Negative for;syncope or near-syncope Positive for;palpitations;chest pain;edema;lightheadedness;dizziness;fatigue;exercise intolerance chest pain, feels soreness from front of chest into his back   Gastroenterology: Negative      Genitourinary:  Negative      Musculoskeletal:    neck pain;back pain hx neck surgery   Neurologic:  Positive for  "headaches;migraine headaches    Psychiatric:  Positive for anxiety    Heme/Lymph/Imm:  Positive for allergies    Endocrine:  Negative        Physical Exam:  Vitals: /68 (BP Location: Left arm, Patient Position: Fowlers, Cuff Size: Adult Large)  Pulse 74  Ht 1.803 m (5' 11\")  Wt 78.9 kg (174 lb)  SpO2 97%  BMI 24.27 kg/m2    General- appears comfortable  Neck- normal JVP, no bruit  Cardiovascular system- s1s2 normal, no m/r/g, right-sided portable catheter. Mild tenderness over xiphoid process.  Respiratory system- CTA b/l  Abdomen- soft, non tender, significant surgical scar seen  Extremities- no pedal edema  Neurological - alert, oriented  Psych- normal affect  HEENT- no pallor    Denis Cancino MD        CC  Esteban Daly MD  [facility]  [address] [place] [address]              "

## 2017-07-24 NOTE — MR AVS SNAPSHOT
After Visit Summary   7/24/2017    Parker Acevedo Sr    MRN: [id number]           Patient Information     Date Of Birth          1970        Visit Information        Provider Department      7/24/2017 2:00 PM Denis Cancino MD [facility]        Today's Diagnoses     Cardiomyopathy, unspecified (H)    -  1       Follow-ups after your visit        Additional Services     Follow-Up with Cardiologist                 Your next 10 appointments already scheduled     Aug 01, 2017 11:00 AM CDT   Office Visit with Esteban Daly MD   [facility] ([facility])    [address], [address]  [place] [id number] [phone]           Bring a current list of meds and any records pertaining to this visit.  For Physicals, please bring immunization records and any forms needing to be filled out.  Please arrive 10 minutes early to complete paperwork.            Sep 18, 2017 11:00 AM CDT   Return Visit with Patti Milligan MD   [facility] ([facility])    [address] [id number] [phone]              Future tests that were ordered for you today     Open Future Orders        Priority Expected Expires Ordered    Follow-Up with Cardiologist Routine 10/22/2017 7/24/2018 7/24/2017            Who to contact     If you have questions or need follow up information about today's clinic visit or your schedule please contact [facility] directly at [phone].  Normal or non-critical lab and imaging results will be communicated to you by MyChart, letter or phone within 4 business days after the clinic has received the results. If you do not hear from us within 7 days, please contact the clinic through MyChart or phone. If you have a critical or abnormal lab result, we will notify you by phone as soon as possible.  Submit refill requests through [employer] or call your pharmacy and  "they will forward the refill request to us. Please allow 3 business days for your refill to be completed.          Additional Information About Your Visit        [facility]hart Information     [facility] gives you secure access to your electronic health record. If you see a primary care provider, you can also send messages to your care team and make appointments. If you have questions, please call your primary care clinic.  If you do not have a primary care provider, please call [phone] and they will assist you.        Care EveryWhere ID     This is your Care EveryWhere ID. This could be used by other organizations to access your [facility] medical records  [id number]        Your Vitals Were     Pulse Height Pulse Oximetry BMI (Body Mass Index)          74 1.803 m (5' 11\") 97% 24.27 kg/m2         Blood Pressure from Last 3 Encounters:   07/24/17 114/68   07/06/17 124/80   07/05/17 143/78    Weight from Last 3 Encounters:   07/24/17 78.9 kg (174 lb)   07/06/17 88.9 kg (195 lb 14.4 oz)   07/05/17 89.4 kg (197 lb)                 Today's Medication Changes          These changes are accurate as of: 7/24/17  2:32 PM.  If you have any questions, ask your nurse or doctor.               Start taking these medicines.        Dose/Directions    carvedilol 3.125 MG tablet   Commonly known as:  COREG   Used for:  Cardiomyopathy, unspecified (H)        Dose:  3.125 mg   Take 1 tablet (3.125 mg) by mouth 2 times daily (with meals)   Quantity:  60 tablet   Refills:  3            Where to get your medicines      These medications were sent to [facility] [place], [place] - [address]  [address] [address]     Phone:  [phone]     carvedilol 3.125 MG tablet                Primary Care Provider Office Phone # Fax #    Esteban Daly [phone]       [facility] [address]  [place] [address]        Equal Access to Services     KATHRYN GUZMAN AH: Hadii aad ku " amy Nathan, wakevonda luqadaha, qaybta kajakobda dayami, carmela irenein hayaan karlamanolo ninashanel lamariesammie belkys. So [facility] [phone].    ATENCIÓN: Si katyala destinee, tiene a hicks disposición servicios gratuitos de asistencia lingüística. Chu al [phone].    We comply with applicable federal civil rights laws and [place] laws. We do not discriminate on the basis of race, color, national origin, age, disability sex, sexual orientation or gender identity.            Thank you!     Thank you for choosing [facility]  for your care. Our goal is always to provide you with excellent care. Hearing back from our patients is one way we can continue to improve our services. Please take a few minutes to complete the written survey that you may receive in the mail after your visit with us. Thank you!             Your Updated Medication List - Protect others around you: Learn how to safely use, store and throw away your medicines at www.disposemymeds.org.          This list is accurate as of: 7/24/17  2:32 PM.  Always use your most recent med list.                   Brand Name Dispense Instructions for use Diagnosis    albuterol 108 (90 BASE) MCG/ACT Inhaler    PROAIR HFA/PROVENTIL HFA/VENTOLIN HFA    1 Inhaler    Inhale 2 puffs into the lungs every 4 hours as needed for shortness of breath / dyspnea or wheezing    Aspiration pneumonitis (H)       * amphetamine-dextroamphetamine 20 MG per tablet    ADDERALL    60 tablet    Take 1 tablet (20 mg) by mouth 2 times daily    ADHD (attention deficit hyperactivity disorder), inattentive type       * amphetamine-dextroamphetamine 20 MG per tablet    ADDERALL    60 tablet    Take 1 tablet (20 mg) by mouth 2 times daily    ADHD (attention deficit hyperactivity disorder), inattentive type       * amphetamine-dextroamphetamine 20 MG per tablet    ADDERALL    60 tablet    Take 1 tablet (20 mg) by mouth 2 times daily    ADHD (attention deficit hyperactivity disorder), inattentive  "type       * amphetamine-dextroamphetamine 20 MG per tablet    ADDERALL    60 tablet    Take 1 tablet (20 mg) by mouth 2 times daily    ADHD (attention deficit hyperactivity disorder), inattentive type       carvedilol 3.125 MG tablet    COREG    60 tablet    Take 1 tablet (3.125 mg) by mouth 2 times daily (with meals)    Cardiomyopathy, unspecified (H)       cyanocobalamin 1000 MCG/ML injection    VITAMIN B12    1 mL    Inject 1 mL (1,000 mcg) into the muscle every 30 days    Bariatric surgery status       diazepam 5 MG tablet    VALIUM    30 tablet    Take 1 tablet (5 mg) by mouth daily as needed for anxiety or sleep    Chronic anxiety       * [facility]IENT      Contact [facility] for patient specific medication information at [phone] on admission and discharge from the hospital.  Phones are answered 24 hours a day 7 days a week 365 days a year.  Providers - Choose \"CONTINUE HOME MED (no script)\" at discharge if patient treatment with home infusion will continue.    S/P bariatric surgery       * [facility] PATIENT      Contact [facility] for patient specific medication information at [phone] on admission and discharge from the hospital.  Phones are answered 24 hours a day 7 days a week 365 days a year.  Providers - Choose \"CONTINUE HOME MED (no script)\" at discharge if patient treatment with home infusion will continue.    Severe malnutrition (H)       fentaNYL 50 mcg/hr 72 hr patch    DURAGESIC    15 patch    Place 1 patch onto the skin every 48 hours MYLAN BRAND ONLY. Fill on/after 7/21/17 to start on/after 7/24/17    Chronic abdominal pain       lidocaine-prilocaine cream    EMLA    30 g    Apply topically as needed for moderate pain    Pain following surgery or procedure       morphine 0.1% in intrasite topical gel     100 g    Apply as needed prior to accessing the port site.    Pain following surgery or procedure       " "mupirocin 2 % ointment    BACTROBAN    30 g    Apply topically 3 times daily    Skin infection       Needle (Disp) 27G X 1/2\" Misc    BD DISP NEEDLES    3 each    1 Device every 30 days Use for cyanocobalamin injection once q 30 days.    Bariatric surgery status       nystatin-triamcinolone cream    MYCOLOG II    60 g    Apply topically 2 times daily as needed    Skin irritation       ondansetron 8 MG ODT tab    ZOFRAN-ODT    90 tablet    Take 1 tablet (8 mg) by mouth every 8 hours as needed for nausea    Nausea       * order for DME     12 each    Injection Supplies for Vitamin B12: 3cc syringes w/ 27 gauge needles, 1/2 inch length    Bariatric surgery status       * order for DME     4 each    Equipment being ordered: Bilateral knee high chronic venous insufficiency stockings--  mild-moderate pressures.    Bilateral edema of lower extremity       oxyCODONE 5 MG/5ML solution    ROXICODONE    1650 mL    Take 10-15 mLs (10-15 mg) by mouth every 4 hours as needed for moderate to severe pain Max of 55mg per day. Fill on/after 7/19/17 not to start till 7/21/17. Early fill due to change in dose    Encounter for long-term opiate analgesic use       senna-docusate 8.6-50 MG per tablet    SENOKOT-S;PERICOLACE    120 tablet    Take 1-2 tablets by mouth 2 times daily as needed for constipation    Slow transit constipation       sucralfate 1 GM/10ML suspension    CARAFATE    1200 mL    Take 10 mLs (1 g) by mouth 4 times daily    Nausea       vitamin D 17802 UNIT capsule    ERGOCALCIFEROL    12 capsule    Take 1 capsule (50,000 Units) by mouth every 7 days    Vitamin D deficiency       ZOFRAN PO      Take 8 mg by mouth        * Notice:  This list has 8 medication(s) that are the same as other medications prescribed for you. Read the directions carefully, and ask your doctor or other care provider to review them with you.      "

## 2017-07-24 NOTE — PROGRESS NOTES
HPI and Plan:   Mr. Acevedo is a very pleasant 46-year-old gentleman with history off gastric bypass surgery complicated by short gut syndrome now on total parenteral nutrition for last several years, recent PICC line infection, tobacco abuse which unfortunately his ongoing, ADHD, history of multivitamin deficiency in the setting of short gut syndrome but now getting supplementation through TPN and regular vitamin B-12 injections. He is establishing cardiology care today as echocardiogram in the past have shown mildly reduced LV systolic function. He is accompanied in the clinic by his wife. He had an echocardiogram done in February 2017 which was a dobutamine stress echocardiogram that at rest showed mildly reduced LV systolic function with mild global hypokinesia with dobutamine LV systolic function increase in size decrease without showing any evidence of inducible ischemia. He also had an echocardiogram done in January 10 16 that also showed mildly reduced LV systolic function with LVEF of 45-50%. Prior to that he had an echocardiogram in August 2015 that reported LVEF of 55-60%. All these echocardiogram images were personally reviewed by me and even the echocardiogram in August 2015 appeared to show mildly reduced LV systolic function around 50%. It looks like before the gastric bypass surgery patient weighed more than 500 pounds but now he is weighing close to 174 pounds. He denies any chest discomfort with physical activity and is fairly active climbs stairs walks a lot, no shortness of breath with exertion. He does have intermittent chest discomfort that can worsen if he moves in a particular way or if he presses on it, he denies any chest discomfort today. Unfortunately he smokes at least half pack per day and has been smoking since age 15 years and in the past had quit successively for 5 years. Patient tells me that he cannot be on any extended release medication like Toprol-XL due to short gut  syndrome. He cannot also lie down flat for more than a short duration due to significant bile reflux.      Assessment and plan  A very delightful 46 year gentleman with history of gastric bypass surgery complicated by short gut syndrome and now on TPN, tobacco abuse with echocardiogram over the last 2 years showing mildly reduced LV systolic function with LVEF around 45-50% with dobutamine stress echocardiogram done earlier this year showing increase in LVEF and decrease in LV size without any evidence of inducible ischemia. Patient does not consume any significant amount of alcohol, in the past used to have hypertension which is now well controlled without any medication after the gastric bypass surgery. Fortunately he is asymptomatic from mildly reduced LV systolic function and is not in congestive heart failure and does not appear to have any anginal symptoms. The intermittent chest discomfort sounds noncardiac/musculoskeletal in nature. I had a long discussion with patient and his 5 regarding the echocardiographic findings. Medications like beta blocker and ACE inhibitor have been shown to prevent remodeling/reverse remodeling in systolic congestive heart failure when LVF is less than 40%. Fortunately he does not have any congestive heart failure. I recommend a trial of low-dose beta blocker like carvedilol 3.125 mg b.i.d. Common adverse effect of carvedilol including but not limited to fatigue, dizziness, hypotension were discuss with patient. If he tolerates  low-dose beta blocker we can subsequently consider adding low-dose ACE inhibitor like lisinopril. I recommend follow-up in 2-3 months at that time depending upon his blood pressure, clinical status we can consider adding low-dose lisinopril. I also strongly recommend patient to quit tobacco or at least cut down, approximately 5 minutes was spent discussing about tobacco cessation.    1. Mildly reduced LV systolic function LVEF around 45-50%. Clinically  not in congestive heart failure, nonischemic cardiomyopathy with negative dobutamine stress echocardiogram February 2017.  2. History of gastric bypass surgery  3. Short gut syndrome on TPN  4. Tobacco abuse    Recommendations  Carvedilol 3.125 mg b.i.d.  Tobacco cessation  Follow-up in 2-3 months    Orders Placed This Encounter   Procedures     Follow-Up with Cardiologist       Orders Placed This Encounter   Medications     carvedilol (COREG) 3.125 MG tablet     Sig: Take 1 tablet (3.125 mg) by mouth 2 times daily (with meals)     Dispense:  60 tablet     Refill:  3       There are no discontinued medications.      Encounter Diagnosis   Name Primary?     Cardiomyopathy, unspecified (H) Yes       CURRENT MEDICATIONS:  Current Outpatient Prescriptions   Medication Sig Dispense Refill     carvedilol (COREG) 3.125 MG tablet Take 1 tablet (3.125 mg) by mouth 2 times daily (with meals) 60 tablet 3     diazepam (VALIUM) 5 MG tablet Take 1 tablet (5 mg) by mouth daily as needed for anxiety or sleep 30 tablet 1     Ondansetron HCl (ZOFRAN PO) Take 8 mg by mouth       oxyCODONE (ROXICODONE) 5 MG/5ML solution Take 10-15 mLs (10-15 mg) by mouth every 4 hours as needed for moderate to severe pain Max of 55mg per day. Fill on/after 7/19/17 not to start till 7/21/17. Early fill due to change in dose 1650 mL 0     fentaNYL (DURAGESIC) 50 mcg/hr 72 hr patch Place 1 patch onto the skin every 48 hours MYLAN BRAND ONLY. Fill on/after 7/21/17 to start on/after 7/24/17 15 patch 0     sucralfate (CARAFATE) 1 GM/10ML suspension Take 10 mLs (1 g) by mouth 4 times daily 1200 mL 11     cyanocobalamin (VITAMIN B12) 1000 MCG/ML injection Inject 1 mL (1,000 mcg) into the muscle every 30 days 1 mL 11     lidocaine-prilocaine (EMLA) cream Apply topically as needed for moderate pain 30 g 11     morphine 0.1% in intrasite topical gel Apply as needed prior to accessing the port site. 100 g 0     amphetamine-dextroamphetamine (ADDERALL) 20 MG per  "tablet Take 1 tablet (20 mg) by mouth 2 times daily 60 tablet 0     ondansetron (ZOFRAN-ODT) 8 MG ODT tab Take 1 tablet (8 mg) by mouth every 8 hours as needed for nausea 90 tablet 3     vitamin D (ERGOCALCIFEROL) 65344 UNIT capsule Take 1 capsule (50,000 Units) by mouth every 7 days 12 capsule 3     albuterol (PROAIR HFA/PROVENTIL HFA/VENTOLIN HFA) 108 (90 BASE) MCG/ACT Inhaler Inhale 2 puffs into the lungs every 4 hours as needed for shortness of breath / dyspnea or wheezing 1 Inhaler 3     amphetamine-dextroamphetamine (ADDERALL) 20 MG per tablet Take 1 tablet (20 mg) by mouth 2 times daily 60 tablet 0     amphetamine-dextroamphetamine (ADDERALL) 20 MG per tablet Take 1 tablet (20 mg) by mouth 2 times daily 60 tablet 0     [facility] PATIENT Contact [facility] for patient specific medication information at [phone] on admission and discharge from the hospital.  Phones are answered 24 hours a day 7 days a week 365 days a year.    Providers - Choose \"CONTINUE HOME MED (no script)\" at discharge if patient treatment with home infusion will continue.       mupirocin (BACTROBAN) 2 % ointment Apply topically 3 times daily 30 g 0     amphetamine-dextroamphetamine (ADDERALL) 20 MG per tablet Take 1 tablet (20 mg) by mouth 2 times daily 60 tablet 0     [facility] Contact [facility] for patient specific medication information at [phone] on admission and discharge from the hospital.  Phones are answered 24 hours a day 7 days a week 365 days a year.    Providers - Choose \"CONTINUE HOME MED (no script)\" at discharge if patient treatment with home infusion will continue.       senna-docusate (SENOKOT-S;PERICOLACE) 8.6-50 MG per tablet Take 1-2 tablets by mouth 2 times daily as needed for constipation 120 tablet 11     Needle, Disp, (BD DISP NEEDLES) 27G X 1/2\" MISC 1 Device every 30 days Use for cyanocobalamin injection once q 30 days. 3 each 4 "     nystatin-triamcinolone (MYCOLOG II) cream Apply topically 2 times daily as needed 60 g 5     order for DME Equipment being ordered: Bilateral knee high chronic venous insufficiency stockings--  mild-moderate pressures. 4 each 5     order for DME Injection Supplies for Vitamin B12: 3cc syringes w/ 27 gauge needles, 1/2 inch length 12 each 0     [DISCONTINUED] NO ACTIVE MEDICATIONS . 0 0       ALLERGIES     Allergies   Allergen Reactions     Bactrim [Sulfamethoxazole W/Trimethoprim] Rash     Penicillins Anaphylaxis     Doxycycline Rash     Vancomycin Rash     Rash after receiving vancomycin 3/28/16 (red man's?). Tolerated with slower infusion and diphenhydramine premed.       PAST MEDICAL HISTORY:  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)      Chronic abdominal pain      Difficulty swallowing      Gastric ulcer, unspecified as acute or chronic, without mention of hemorrhage, perforation, or obstruction      Gastro-oesophageal reflux disease      Head injury      Hiatal hernia      Other bladder disorder      Other chronic pain      Severe malnutrition (H)     TPN     Short gut syndrome      Tobacco abuse        PAST SURGICAL HISTORY:  Past Surgical History:   Procedure Laterality Date     APPENDECTOMY       BACK SURGERY  11/3/2014    curve in the spine     BIOPSY LYMPH NODE CERVICAL N/A 2/20/2015    Procedure: BIOPSY LYMPH NODE CERVICAL;  Surgeon: Baron Scanlon MD;  Location: [facility]     C GASTRIC BYPASS,OBESE<100CM SHAYLEE-EN-Y  2002    lost 300 pounds     CHOLECYSTECTOMY       DISCECTOMY, FUSION CERVICAL ANTERIOR ONE LEVEL, COMBINED N/A 2/15/2017    Procedure: COMBINED DISCECTOMY, FUSION CERVICAL ANTERIOR ONE LEVEL;  Surgeon: Darren Campos MD;  Location: [facility]     ENDOSCOPIC INSERTION TUBE  GASTROSTOMY  9/9/2013    Procedure: ENDOSCOPIC INSERTION TUBE GASTROSTOMY;;  Surgeon: Francis Vyas MD;  [facility]     ENDOSCOPIC ULTRASOUND UPPER GASTROINTESTINAL TRACT (GI)  4/29/2011    Procedure:ENDOSCOPIC ULTRASOUND UPPER GASTROINTESTINAL TRACT (GI); Both Procedures done Conjointly; Surgeon:NEREIDA HOUSER; [facility]     ENDOSCOPIC ULTRASOUND UPPER GASTROINTESTINAL TRACT (GI)  9/9/2013    Procedure: ENDOSCOPIC ULTRASOUND UPPER GASTROINTESTINAL TRACT (GI);  Endoscopic Ultrasound Guide Gastrostomy Tube Placement  C-arm;  Surgeon: Noe Lizarraga MD;  [facility]     ENDOSCOPY  03/25/11    EGD, [place] [facility]     ENDOSCOPY  08/04/09    Upper Endoscopy, [facility]     ENDOSCOPY  01/05/09    Upper Endoscopy, [facility]     ESOPHAGOSCOPY, GASTROSCOPY, DUODENOSCOPY (EGD), COMBINED  4/20/2011    Procedure:COMBINED ESOPHAGOSCOPY, GASTROSCOPY, DUODENOSCOPY (EGD); Surgeon:FRANCIS VYAS; [facility]     ESOPHAGOSCOPY, GASTROSCOPY, DUODENOSCOPY (EGD), COMBINED  6/15/2011    Procedure:COMBINED ESOPHAGOSCOPY, GASTROSCOPY, DUODENOSCOPY (EGD); Surgeon:FRANCIS VYAS; [facility]     ESOPHAGOSCOPY, GASTROSCOPY, DUODENOSCOPY (EGD), COMBINED  6/12/2013    Procedure: COMBINED ESOPHAGOSCOPY, GASTROSCOPY, DUODENOSCOPY (EGD);;  Surgeon: Francis Vyas MD;  [facility]     ESOPHAGOSCOPY, GASTROSCOPY, DUODENOSCOPY (EGD), COMBINED  11/22/2013    Procedure: COMBINED ESOPHAGOSCOPY, GASTROSCOPY, DUODENOSCOPY (EGD);;  Surgeon: Francis Vyas MD;  [facility]     ESOPHAGOSCOPY, GASTROSCOPY, DUODENOSCOPY (EGD), COMBINED  4/30/2014    Procedure: COMBINED ESOPHAGOSCOPY, GASTROSCOPY, DUODENOSCOPY (EGD);  Surgeon: Francis Vyas MD;  [facility]     ESOPHAGOSCOPY, GASTROSCOPY, DUODENOSCOPY (EGD), COMBINED N/A 2/20/2015    Procedure: COMBINED ESOPHAGOSCOPY, GASTROSCOPY, DUODENOSCOPY (EGD), BIOPSY SINGLE OR MULTIPLE;  Surgeon: Baron Scanlon MD;   [facility]     ESOPHAGOSCOPY, GASTROSCOPY, DUODENOSCOPY (EGD), COMBINED N/A 9/30/2015    Procedure: COMBINED ESOPHAGOSCOPY, GASTROSCOPY, DUODENOSCOPY (EGD);  Surgeon: Francis Vyas MD;  [facility]     GASTRECTOMY  6/22/2012    Procedure: GASTRECTOMY;  Open Approach, Excise Ulcers,Partial Gastrectomy, Esophagojejunostomy, Hiatal Hernia Repair, Extensive Lysis of Adhesions and Esaphagogastrodudenoscopy.;  Surgeon: Francis Vyas MD;  [facility]     GASTROJEJUNOSTOMY  08/26/09    Extensice enterolysis, partial resect. jejunum, part. resect gastric pouch, gastrojejunostomy anastomosis     HC ESOPH/GAS REFLUX TEST W NASAL IMPED ELECTRODE  8/5/2013    Procedure: ESOPHAGEAL IMPEDENCE FUNCTION TEST 1 HOUR OR LESS;  Surgeon: Halie Lang MD;  Location:  GI     HEAD & NECK SURGERY  2/15/2017    C5-C6     HERNIA REPAIR  2006    Umbilical hernia     HERNIORRHAPHY HIATAL  6/22/2012    Procedure: HERNIORRHAPHY HIATAL;;  Surgeon: Francis Vyas MD;  [facility]     HERNIORRHAPHY INGUINAL  11/22/2013    Procedure: HERNIORRHAPHY INGUINAL;;  Surgeon: Francis Vyas MD;  [facility]     LAPAROTOMY EXPLORATORY  11/22/2013    Procedure: LAPAROTOMY EXPLORATORY;  Exploratory Laparotomy, Upper Endoscopy, Left Inguinal Hernia Repair;  Surgeon: Francis Vyas MD;  [facility]     PICC INSERTION Right 03/16/2017    5fr DL BioFlo PICC, 42cm (3cm external) in the R medial brachial vein w/ tip in the SVC RA junction.     SHAYLEE EN Y BOWEL  2003     SOFT TISSUE SURGERY       SOFT TISSUE SURGERY       TONSILLECTOMY         FAMILY HISTORY:  Family History   Problem Relation Age of Onset     GASTROINTESTINAL DISEASE Mother      Crohns disease     Anxiety Disorder Mother      Thyroid Disease Mother      Grave's disease     CANCER Father      ear cancer-skin cancer/melanoma     Breast Cancer Maternal Grandmother      DIABETES Maternal Uncle      Breast Cancer Other       "Hypertension No family hx of      Hyperlipidemia No family hx of      CEREBROVASCULAR DISEASE No family hx of      Prostate Cancer No family hx of      Depression No family hx of      Anesthesia Reaction No family hx of      Asthma No family hx of      OSTEOPOROSIS No family hx of      Genetic Disorder No family hx of      Obesity No family hx of      MENTAL ILLNESS No family hx of      Substance Abuse No family hx of        SOCIAL HISTORY:  Social History     Social History     Marital status:      Spouse name: Rose     Number of children: 1     Years of education: N/A     Social History Main Topics     Smoking status: Light Tobacco Smoker     Packs/day: 0.10     Years: 3.00     Types: Cigarettes     Smokeless tobacco: Current User      Comment: I use an e cig every now and than     Alcohol use No      Comment: quit      Drug use: No     Sexual activity: Yes     Partners: Female     Birth control/ protection: None      Comment: no protection     Other Topics Concern     Parent/Sibling W/ Cabg, Mi Or Angioplasty Before 65f 55m? No     Social History Narrative       Review of Systems:  Skin:  Negative       Eyes:  Negative      ENT:  Negative      Respiratory:  Positive for dyspnea on exertion;shortness of breath     Cardiovascular:  Negative for;syncope or near-syncope Positive for;palpitations;chest pain;edema;lightheadedness;dizziness;fatigue;exercise intolerance chest pain, feels soreness from front of chest into his back   Gastroenterology: Negative      Genitourinary:  Negative      Musculoskeletal:    neck pain;back pain hx neck surgery   Neurologic:  Positive for headaches;migraine headaches    Psychiatric:  Positive for anxiety    Heme/Lymph/Imm:  Positive for allergies    Endocrine:  Negative        Physical Exam:  Vitals: /68 (BP Location: Left arm, Patient Position: Fowlers, Cuff Size: Adult Large)  Pulse 74  Ht 1.803 m (5' 11\")  Wt 78.9 kg (174 lb)  SpO2 97%  BMI 24.27 " kg/m2    General- appears comfortable  Neck- normal JVP, no bruit  Cardiovascular system- s1s2 normal, no m/r/g, right-sided portable catheter. Mild tenderness over xiphoid process.  Respiratory system- CTA b/l  Abdomen- soft, non tender, significant surgical scar seen  Extremities- no pedal edema  Neurological - alert, oriented  Psych- normal affect  HEENT- no pallor          CC  Esteban Daly MD  [facility]  [address] [place] [address]

## 2017-07-25 NOTE — PROGRESS NOTES
This is a recent snapshot of the patient's [facility] medical record.  For current drug dose and complete information and questions, call [phone] or In Basket pool, [facility] home infusion ([id number])  CSN Number:  [id number]

## 2017-07-25 NOTE — PROGRESS NOTES
This is a recent snapshot of the patient's [facility] medical record.  For current drug dose and complete information and questions, call [phone] or In Basket pool, [facility] home infusion ([id number])  CSN Number:  [id number]

## 2017-07-26 NOTE — PROGRESS NOTES
This is a recent snapshot of the patient's [facility] medical record.  For current drug dose and complete information and questions, call [phone] or In [facility] pool, [facility] home infusion ([id number])  CSN Number:  [id number]

## 2017-07-28 ENCOUNTER — TELEPHONE (OUTPATIENT)
Dept: FAMILY MEDICINE | Facility: CLINIC | Age: 47
End: 2017-07-28

## 2017-07-28 NOTE — TELEPHONE ENCOUNTER
Reason for call:  Perla states that he is running a low grade fever and trouble with urinating.  She was wondering if she should get a UA

## 2017-07-28 NOTE — PROGRESS NOTES
This is a recent snapshot of the patient's [facility] medical record.  For current drug dose and complete information and questions, call [phone] or In Basket pool, [facility] home infusion ([id number])  CSN Number:  [id number]

## 2017-07-31 NOTE — PROGRESS NOTES
This is a recent snapshot of the patient's [facility] medical record.  For current drug dose and complete information and questions, call [phone] or In Basket pool, [facility] home infusion ([id number])  CSN Number:  [id number]

## 2017-07-31 NOTE — TELEPHONE ENCOUNTER
Left message for Perla home care nurse, to call back.  Pt does have an appointment tomorrow.  Next 5 appointments (look out 90 days)     Aug 01, 2017 11:00 AM CDT   Office Visit with Estebna Daly MD   [facility] ([facility])    [address], [address]  [place] [address]   [phone]            Sep 18, 2017 11:00 AM CDT   Return Visit with Patti Milligan MD   [facility] ([facility])    [address] [address]   [phone]            Oct 26, 2017  1:00 PM CDT   Return Visit with Denis Cancino MD   [facility]    [address] [address]   [phone]                    Khalida Beard RN

## 2017-08-01 ENCOUNTER — OFFICE VISIT (OUTPATIENT)
Dept: FAMILY MEDICINE | Facility: CLINIC | Age: 47
End: 2017-08-01
Payer: COMMERCIAL

## 2017-08-01 ENCOUNTER — MYC REFILL (OUTPATIENT)
Dept: FAMILY MEDICINE | Facility: CLINIC | Age: 47
End: 2017-08-01

## 2017-08-01 VITALS
SYSTOLIC BLOOD PRESSURE: 128 MMHG | HEART RATE: 72 BPM | BODY MASS INDEX: 24.42 KG/M2 | TEMPERATURE: 98.2 F | RESPIRATION RATE: 16 BRPM | WEIGHT: 175.1 LBS | DIASTOLIC BLOOD PRESSURE: 74 MMHG

## 2017-08-01 DIAGNOSIS — F41.9 CHRONIC ANXIETY: ICD-10-CM

## 2017-08-01 DIAGNOSIS — G89.18 PAIN FOLLOWING SURGERY OR PROCEDURE: ICD-10-CM

## 2017-08-01 DIAGNOSIS — R10.9 CHRONIC ABDOMINAL PAIN: ICD-10-CM

## 2017-08-01 DIAGNOSIS — F90.0 ADHD (ATTENTION DEFICIT HYPERACTIVITY DISORDER), INATTENTIVE TYPE: Primary | ICD-10-CM

## 2017-08-01 DIAGNOSIS — Z98.84 BARIATRIC SURGERY STATUS: ICD-10-CM

## 2017-08-01 DIAGNOSIS — R30.0 DYSURIA: ICD-10-CM

## 2017-08-01 DIAGNOSIS — R11.0 NAUSEA: ICD-10-CM

## 2017-08-01 DIAGNOSIS — G89.29 CHRONIC ABDOMINAL PAIN: ICD-10-CM

## 2017-08-01 LAB
ALBUMIN UR-MCNC: NEGATIVE MG/DL
APPEARANCE UR: CLEAR
BILIRUB UR QL STRIP: NEGATIVE
COLOR UR AUTO: YELLOW
GLUCOSE UR STRIP-MCNC: NEGATIVE MG/DL
HGB UR QL STRIP: ABNORMAL
KETONES UR STRIP-MCNC: NEGATIVE MG/DL
LEUKOCYTE ESTERASE UR QL STRIP: NEGATIVE
NITRATE UR QL: NEGATIVE
PH UR STRIP: 6.5 PH (ref 5–7)
RBC #/AREA URNS AUTO: NORMAL /HPF (ref 0–2)
SP GR UR STRIP: 1.01 (ref 1–1.03)
URN SPEC COLLECT METH UR: ABNORMAL
UROBILINOGEN UR STRIP-ACNC: 0.2 EU/DL (ref 0.2–1)
WBC #/AREA URNS AUTO: NORMAL /HPF (ref 0–2)

## 2017-08-01 PROCEDURE — 87086 URINE CULTURE/COLONY COUNT: CPT | Performed by: FAMILY MEDICINE

## 2017-08-01 PROCEDURE — 99214 OFFICE O/P EST MOD 30 MIN: CPT | Performed by: FAMILY MEDICINE

## 2017-08-01 PROCEDURE — 81001 URINALYSIS AUTO W/SCOPE: CPT | Performed by: FAMILY MEDICINE

## 2017-08-01 RX ORDER — DEXTROAMPHETAMINE SACCHARATE, AMPHETAMINE ASPARTATE, DEXTROAMPHETAMINE SULFATE AND AMPHETAMINE SULFATE 5; 5; 5; 5 MG/1; MG/1; MG/1; MG/1
20 TABLET ORAL 2 TIMES DAILY
Qty: 60 TABLET | Refills: 0 | Status: SHIPPED | OUTPATIENT
Start: 2017-09-01 | End: 2017-11-03

## 2017-08-01 RX ORDER — DEXTROAMPHETAMINE SACCHARATE, AMPHETAMINE ASPARTATE, DEXTROAMPHETAMINE SULFATE AND AMPHETAMINE SULFATE 5; 5; 5; 5 MG/1; MG/1; MG/1; MG/1
20 TABLET ORAL 2 TIMES DAILY
Qty: 60 TABLET | Refills: 0 | Status: SHIPPED | OUTPATIENT
Start: 2017-10-02 | End: 2017-11-03

## 2017-08-01 RX ORDER — DEXTROAMPHETAMINE SACCHARATE, AMPHETAMINE ASPARTATE, DEXTROAMPHETAMINE SULFATE AND AMPHETAMINE SULFATE 5; 5; 5; 5 MG/1; MG/1; MG/1; MG/1
20 TABLET ORAL 2 TIMES DAILY
Qty: 60 TABLET | Refills: 0 | Status: SHIPPED | OUTPATIENT
Start: 2017-08-02 | End: 2017-11-03

## 2017-08-01 RX ORDER — DIAZEPAM 5 MG
2.5 TABLET ORAL DAILY PRN
Qty: 30 TABLET | Refills: 0 | Status: ON HOLD
Start: 2017-08-01 | End: 2017-09-20

## 2017-08-01 ASSESSMENT — PAIN SCALES - GENERAL: PAINLEVEL: NO PAIN (0)

## 2017-08-01 NOTE — MR AVS SNAPSHOT
After Visit Summary   8/1/2017    Parker Acevedo     MRN: [id number]           Patient Information     Date Of Birth          1970        Visit Information        Provider Department      8/1/2017 11:00 AM Esteban Daly MD [facility]        Today's Diagnoses     ADHD (attention deficit hyperactivity disorder), inattentive type    -  1    Dysuria        Chronic anxiety          Care Instructions    These are new changes to your current plan of care based on today's visit:    Medications stopped    Medications to be started    Change dose of this medication Reducing Valium to 2.5 mg at night for one month and then stop using Valium   New treatments        Follow up appointments:    1.  FOLLOW UP WITH YOUR PRIMARY CARE PROVIDER: 3 month(s) for clinic visit    Esteban Daly MD                Follow-ups after your visit        Follow-up notes from your care team     Return in about 3 months (around 11/1/2017) for Routine Visit.      Your next 10 appointments already scheduled     Sep 18, 2017 11:00 AM CDT   Return Visit with Patti Milligan MD   [facility] ([facility])    [address] [place] [id number] [phone]            Oct 26, 2017  1:00 PM CDT   Return Visit with Denis Cancino MD   [facility]    [address] [place] [id number] [phone]              Who to contact     If you have questions or need follow up information about today's clinic visit or your schedule please contact [facility] directly at [phone].  Normal or non-critical lab and imaging results will be communicated to you by MyChart, letter or phone within 4 business days after the clinic has received the results. If you do not hear from us within 7 days, please contact the clinic through MyChart or phone. If you have a critical or abnormal lab result, we will  notify you by phone as soon as possible.  Submit refill requests through [employer] or call your pharmacy and they will forward the refill request to us. Please allow 3 business days for your refill to be completed.          Additional Information About Your Visit        [facility] Information     [employer] gives you secure access to your electronic health record. If you see a primary care provider, you can also send messages to your care team and make appointments. If you have questions, please call your primary care clinic.  If you do not have a primary care provider, please call [phone] and they will assist you.        Care EveryWhere ID     This is your Care EveryWhere ID. This could be used by other organizations to access your [facility] medical records  [id number]        Your Vitals Were     Pulse Temperature Respirations BMI (Body Mass Index)          72 98.2  F (36.8  C) (Temporal) 16 24.42 kg/m2         Blood Pressure from Last 3 Encounters:   08/01/17 128/74   07/24/17 114/68   07/06/17 124/80    Weight from Last 3 Encounters:   08/01/17 175 lb 1.6 oz (79.4 kg)   07/24/17 174 lb (78.9 kg)   07/06/17 195 lb 14.4 oz (88.9 kg)              We Performed the Following     *UA reflex to Microscopic     Urine Culture Aerobic Bacterial          Today's Medication Changes          These changes are accurate as of: 8/1/17 11:30 AM.  If you have any questions, ask your nurse or doctor.               These medicines have changed or have updated prescriptions.        Dose/Directions    * amphetamine-dextroamphetamine 20 MG per tablet   Commonly known as:  ADDERALL   This may have changed:  Another medication with the same name was removed. Continue taking this medication, and follow the directions you see here.   Used for:  ADHD (attention deficit hyperactivity disorder), inattentive type   Changed by:  Esteban Daly MD        Dose:  20 mg   Start taking on:  8/2/2017   Take 1 tablet (20 mg) by mouth 2 times  daily   Quantity:  60 tablet   Refills:  0       * amphetamine-dextroamphetamine 20 MG per tablet   Commonly known as:  ADDERALL   This may have changed:  Another medication with the same name was removed. Continue taking this medication, and follow the directions you see here.   Used for:  ADHD (attention deficit hyperactivity disorder), inattentive type   Changed by:  Esteban Daly MD        Dose:  20 mg   Start taking on:  9/1/2017   Take 1 tablet (20 mg) by mouth 2 times daily   Quantity:  60 tablet   Refills:  0       * amphetamine-dextroamphetamine 20 MG per tablet   Commonly known as:  ADDERALL   This may have changed:  Another medication with the same name was removed. Continue taking this medication, and follow the directions you see here.   Used for:  ADHD (attention deficit hyperactivity disorder), inattentive type   Changed by:  Esteban Daly MD        Dose:  20 mg   Start taking on:  10/2/2017   Take 1 tablet (20 mg) by mouth 2 times daily   Quantity:  60 tablet   Refills:  0       diazepam 5 MG tablet   Commonly known as:  VALIUM   This may have changed:  how much to take   Used for:  Chronic anxiety   Changed by:  Esteban Daly MD        Dose:  2.5 mg   Take 0.5 tablets (2.5 mg) by mouth daily as needed for anxiety or sleep   Quantity:  30 tablet   Refills:  0       * Notice:  This list has 3 medication(s) that are the same as other medications prescribed for you. Read the directions carefully, and ask your doctor or other care provider to review them with you.      Stop taking these medicines if you haven't already. Please contact your care team if you have questions.     ZOFRNAHUM VALERO   Stopped by:  Esteban Daly MD                Where to get your medicines      Some of these will need a paper prescription and others can be bought over the counter.  Ask your nurse if you have questions.     Bring a paper prescription for each of these medications      amphetamine-dextroamphetamine 20 MG per tablet    amphetamine-dextroamphetamine 20 MG per tablet    amphetamine-dextroamphetamine 20 MG per tablet       You don't need a prescription for these medications     diazepam 5 MG tablet                Primary Care Provider Office Phone # Fax #    Esteban Daly [phone]       [facility] [address] [address] [facility] [place] [address]        Equal Access to Services     KATHRYN GUZMAN : Hadii aad ku hadasho Soomaali, waaxda luqadaha, qaybta kaalmada adeegyada, waxay idiin hayaan adeeg kharash la'aan ah. So [facility] [phone].    ATENCIÓN: Si habla espmaicol, tiene a hicks disposición servicios gratuitos de asistencia lingüística. [facility] [phone].    We comply with applicable federal civil rights laws and [place] laws. We do not discriminate on the basis of race, color, national origin, age, disability sex, sexual orientation or gender identity.            Thank you!     Thank you for choosing [facility]  for your care. Our goal is always to provide you with excellent care. Hearing back from our patients is one way we can continue to improve our services. Please take a few minutes to complete the written survey that you may receive in the mail after your visit with us. Thank you!             Your Updated Medication List - Protect others around you: Learn how to safely use, store and throw away your medicines at www.disposemymeds.org.          This list is accurate as of: 8/1/17 11:30 AM.  Always use your most recent med list.                   Brand Name Dispense Instructions for use Diagnosis    albuterol 108 (90 BASE) MCG/ACT Inhaler    PROAIR HFA/PROVENTIL HFA/VENTOLIN HFA    1 Inhaler    Inhale 2 puffs into the lungs every 4 hours as needed for shortness of breath / dyspnea or wheezing    Aspiration pneumonitis (H)       * amphetamine-dextroamphetamine 20 MG per tablet   Start taking on:  8/2/2017    ADDERALL    60 tablet    Take  "1 tablet (20 mg) by mouth 2 times daily    ADHD (attention deficit hyperactivity disorder), inattentive type       * amphetamine-dextroamphetamine 20 MG per tablet   Start taking on:  9/1/2017    ADDERALL    60 tablet    Take 1 tablet (20 mg) by mouth 2 times daily    ADHD (attention deficit hyperactivity disorder), inattentive type       * amphetamine-dextroamphetamine 20 MG per tablet   Start taking on:  10/2/2017    ADDERALL    60 tablet    Take 1 tablet (20 mg) by mouth 2 times daily    ADHD (attention deficit hyperactivity disorder), inattentive type       carvedilol 3.125 MG tablet    COREG    60 tablet    Take 1 tablet (3.125 mg) by mouth 2 times daily (with meals)    Cardiomyopathy, unspecified (H)       cyanocobalamin 1000 MCG/ML injection    VITAMIN B12    1 mL    Inject 1 mL (1,000 mcg) into the muscle every 30 days    Bariatric surgery status       diazepam 5 MG tablet    VALIUM    30 tablet    Take 0.5 tablets (2.5 mg) by mouth daily as needed for anxiety or sleep    Chronic anxiety       * [facility] PATIENT      Contact [facility] for patient specific medication information at [phone] on admission and discharge from the hospital.  Phones are answered 24 hours a day 7 days a week 365 days a year.  Providers - Choose \"CONTINUE HOME MED (no script)\" at discharge if patient treatment with home infusion will continue.    S/P bariatric surgery       * [facility] PATIENT      Contact [facility] for patient specific medication information at [phone] on admission and discharge from the hospital.  Phones are answered 24 hours a day 7 days a week 365 days a year.  Providers - Choose \"CONTINUE HOME MED (no script)\" at discharge if patient treatment with home infusion will continue.    Severe malnutrition (H)       fentaNYL 50 mcg/hr 72 hr patch    DURAGESIC    15 patch    Place 1 patch onto the skin every 48 hours MYLAN BRAND ONLY. Fill " "on/after 7/21/17 to start on/after 7/24/17    Chronic abdominal pain       lidocaine-prilocaine cream    EMLA    30 g    Apply topically as needed for moderate pain    Pain following surgery or procedure       morphine 0.1% in intrasite topical gel     100 g    Apply as needed prior to accessing the port site.    Pain following surgery or procedure       mupirocin 2 % ointment    BACTROBAN    30 g    Apply topically 3 times daily    Skin infection       Needle (Disp) 27G X 1/2\" Misc    BD DISP NEEDLES    3 each    1 Device every 30 days Use for cyanocobalamin injection once q 30 days.    Bariatric surgery status       nystatin-triamcinolone cream    MYCOLOG II    60 g    Apply topically 2 times daily as needed    Skin irritation       ondansetron 8 MG ODT tab    ZOFRAN-ODT    90 tablet    Take 1 tablet (8 mg) by mouth every 8 hours as needed for nausea    Nausea       * order for DME     12 each    Injection Supplies for Vitamin B12: 3cc syringes w/ 27 gauge needles, 1/2 inch length    Bariatric surgery status       * order for DME     4 each    Equipment being ordered: Bilateral knee high chronic venous insufficiency stockings--  mild-moderate pressures.    Bilateral edema of lower extremity       oxyCODONE 5 MG/5ML solution    ROXICODONE    1650 mL    Take 10-15 mLs (10-15 mg) by mouth every 4 hours as needed for moderate to severe pain Max of 55mg per day. Fill on/after 7/19/17 not to start till 7/21/17. Early fill due to change in dose    Encounter for long-term opiate analgesic use       senna-docusate 8.6-50 MG per tablet    SENOKOT-S;PERICOLACE    120 tablet    Take 1-2 tablets by mouth 2 times daily as needed for constipation    Slow transit constipation       sucralfate 1 GM/10ML suspension    CARAFATE    1200 mL    Take 10 mLs (1 g) by mouth 4 times daily    Nausea       vitamin D 59503 UNIT capsule    ERGOCALCIFEROL    12 capsule    Take 1 capsule (50,000 Units) by mouth every 7 days    Vitamin D " deficiency       * Notice:  This list has 7 medication(s) that are the same as other medications prescribed for you. Read the directions carefully, and ask your doctor or other care provider to review them with you.

## 2017-08-01 NOTE — PROGRESS NOTES
SUBJECTIVE:                                                    Parker Acevedo Sr is a 46 year old male who presents to clinic today for the following health issues:      Medication Follow up of Adderall--    As has been stable on Adderall 20 mg twice daily. Has been using this dose of medication with good success for long period of time. Has been seen every 3 months with prescriptions written for the pharmacy. No unusual side effects noted.    Taking Medication as prescribed: yes    Side Effects:  None    Medication Helping Symptoms:  yes             Problem list and histories reviewed & adjusted, as indicated.  Additional history: as documented        Reviewed and updated as needed this visit by clinical staffTobacco  Allergies  Med Hx  Surg Hx  Fam Hx  Soc Hx      Reviewed and updated as needed this visit by Provider         ROS:  CONSTITUTIONAL: Appears to be relatively stable with a functioning port for nutrition. Weight is relatively stable. Reports occasional temperatures to 100.1  but no obvious infectious areas. No major fatigue. Overall feels recently well.  I: NEGATIVE for worrisome rashes, moles or lesions  E: NEGATIVE for vision changes or irritation  E/M: NEGATIVE for ear, mouth and throat problems  R: NEGATIVE for significant cough or SOB  CV: NEGATIVE for chest pain, palpitations or peripheral edema  GI: Has chronic abdominal pain managed by his pain management clinic. Some of the opioid doses are increasing slightly. Post gastric bypass surgery with subsequent complications and essentially has had a total gastrectomy. Good deal of bile acid reflux.   male : Report some mild dysuria over the last week or so in association with the fever.  M: NEGATIVE for significant arthralgias or myalgia  N: NEGATIVE for weakness, dizziness or paresthesias  E: NEGATIVE for temperature intolerance, skin/hair changes  H: NEGATIVE for bleeding problems  P: NEGATIVE for changes in mood or  affect  PSYCHIATRIC: Chronic anxiety for which Valium is being reduced slowly due to the increasing doses of opioids. Stable for the most part with ADHD.    OBJECTIVE:                                                    /74  Pulse 72  Temp 98.2  F (36.8  C) (Temporal)  Resp 16  Wt 175 lb 1.6 oz (79.4 kg)  BMI 24.42 kg/m2  Body mass index is 24.42 kg/(m^2).  GENERAL: alert, no distress and cooperative  EYES: Eyes grossly normal to inspection, extraocular movements - intact, and PERRL  HENT: ear canals- normal; TMs- normal; Nose- normal; Mouth- no ulcers, no lesions  NECK: no tenderness, no adenopathy, no asymmetry, no masses, no stiffness; thyroid- normal to palpation  RESP: lungs clear to auscultation - no rales, no rhonchi, no wheezes  CV: regular rates and rhythm, normal S1 S2, no S3 or S4 and no murmur, no click or rub -  ABDOMEN: Abdomen is soft and not distended. Gastrostomy tube noted in the left upper quadrant. Minimal tenderness noted today.  MS: extremities- no gross deformities noted, no edema  SKIN: no suspicious lesions, no rashes  NEURO: strength and tone- normal, sensory exam- grossly normal, mentation- intact, speech- normal, reflexes- symmetric  NEURO: No focal deficits noted. Cortical functioning intact.  BACK: no CVA tenderness, no paralumbar tenderness  - male: testicles- normal, no atrophy, no masses;  no inguinal hernias  Rectal- male: Deferred due to patient request  PSYCH: Alert and oriented times 3; speech- coherent , normal rate and volume; able to articulate logical thoughts, able to abstract reason, no tangential thoughts, no hallucinations or delusions, affect- normal  LYMPHATICS: ant. cervical- normal, post. cervical- normal, axillary- normal, supraclavicular- normal, inguinal- normal    Diagnostic test results:  Diagnostic Test Results:  Results for orders placed or performed in visit on 08/01/17 (from the past 24 hour(s))   *UA reflex to Microscopic   Result Value Ref Range     Color Urine Yellow     Appearance Urine Clear     Glucose Urine Negative NEG mg/dL    Bilirubin Urine Negative NEG    Ketones Urine Negative NEG mg/dL    Specific Gravity Urine 1.015 1.003 - 1.035    Blood Urine Trace (A) NEG    pH Urine 6.5 5.0 - 7.0 pH    Protein Albumin Urine Negative NEG mg/dL    Urobilinogen Urine 0.2 0.2 - 1.0 EU/dL    Nitrite Urine Negative NEG    Leukocyte Esterase Urine Negative NEG    Source Midstream Urine    Urine Microscopic   Result Value Ref Range    WBC Urine O - 2 0 - 2 /HPF    RBC Urine O - 2 0 - 2 /HPF     *Note: Due to a large number of results and/or encounters for the requested time period, some results have not been displayed. A complete set of results can be found in Results Review.          ASSESSMENT/PLAN:                                                    1. ADHD (attention deficit hyperactivity disorder), inattentive type   Renew her current medications at their current dose. Follow-up in 3 months.  - amphetamine-dextroamphetamine (ADDERALL) 20 MG per tablet; Take 1 tablet (20 mg) by mouth 2 times daily  Dispense: 60 tablet; Refill: 0  - amphetamine-dextroamphetamine (ADDERALL) 20 MG per tablet; Take 1 tablet (20 mg) by mouth 2 times daily  Dispense: 60 tablet; Refill: 0  - amphetamine-dextroamphetamine (ADDERALL) 20 MG per tablet; Take 1 tablet (20 mg) by mouth 2 times daily  Dispense: 60 tablet; Refill: 0  - Urine Microscopic    2. Dysuria   No obvious infection noted. Culture pending.  - *UA reflex to Microscopic  - Urine Culture Aerobic Bacterial    3. Chronic anxiety   Reduced to Valium 2.5 mg daily for the next month and then discontinue. He would have some supply left if needed but no further refills to be given.   - diazepam (VALIUM) 5 MG tablet; Take 0.5 tablets (2.5 mg) by mouth daily as needed for anxiety or sleep  Dispense: 30 tablet; Refill: 0    4. Chronic abdominal pain   Followed by his pain management clinic with adjustment in opioid  therapies.      Follow up with Provider - Follow-up in 3 months or as needed.   See Patient Instructions    The patient understood the rational for the diagnosis and treatment plan. All questions were answered to best of my ability and the patient's satisfaction.      Esteban Daly MD  [facility]

## 2017-08-01 NOTE — PATIENT INSTRUCTIONS
These are new changes to your current plan of care based on today's visit:    Medications stopped    Medications to be started    Change dose of this medication Reducing Valium to 2.5 mg at night for one month and then stop using Valium   New treatments        Follow up appointments:    1.  FOLLOW UP WITH YOUR PRIMARY CARE PROVIDER: 3 month(s) for clinic visit    Esteban Daly MD

## 2017-08-01 NOTE — NURSING NOTE
"Chief Complaint   Patient presents with     Recheck Medication     Adderall     Panel Management       Initial /74  Pulse 72  Temp 98.2  F (36.8  C) (Temporal)  Resp 16  Wt 175 lb 1.6 oz (79.4 kg)  BMI 24.42 kg/m2 Estimated body mass index is 24.42 kg/(m^2) as calculated from the following:    Height as of 7/24/17: 5' 11\" (1.803 m).    Weight as of this encounter: 175 lb 1.6 oz (79.4 kg).  Medication Reconciliation: complete  Rui Dacosta MA    "

## 2017-08-02 ENCOUNTER — TELEPHONE (OUTPATIENT)
Dept: FAMILY MEDICINE | Facility: CLINIC | Age: 47
End: 2017-08-02

## 2017-08-02 DIAGNOSIS — R11.0 NAUSEA: ICD-10-CM

## 2017-08-02 LAB
BACTERIA SPEC CULT: NO GROWTH
MICRO REPORT STATUS: NORMAL
SPECIMEN SOURCE: NORMAL

## 2017-08-02 RX ORDER — LIDOCAINE/PRILOCAINE 2.5 %-2.5%
CREAM (GRAM) TOPICAL PRN
Qty: 30 G | Refills: 11 | Status: SHIPPED | OUTPATIENT
Start: 2017-08-02 | End: 2017-10-03

## 2017-08-02 RX ORDER — CYANOCOBALAMIN 1000 UG/ML
1 INJECTION, SOLUTION INTRAMUSCULAR; SUBCUTANEOUS
Qty: 1 ML | Refills: 11 | Status: SHIPPED | OUTPATIENT
Start: 2017-08-02 | End: 2017-10-03

## 2017-08-02 RX ORDER — ONDANSETRON 8 MG/1
8 TABLET, ORALLY DISINTEGRATING ORAL EVERY 8 HOURS PRN
Qty: 90 TABLET | Refills: 3 | Status: SHIPPED | OUTPATIENT
Start: 2017-08-02 | End: 2017-10-03

## 2017-08-02 NOTE — TELEPHONE ENCOUNTER
Printed the previous PA from the encounter dated 1/2017.    Placed in Providers inbox to review     Per Providers approval, will fax to [phone]

## 2017-08-02 NOTE — TELEPHONE ENCOUNTER
Reviewed and the patient's situation and need for Zofran is the same as last year -- can use the same information for the current PA.    Esteban Daly MD  Please close encounter when call/work completed.

## 2017-08-02 NOTE — TELEPHONE ENCOUNTER
Message from MyChart:  Original authorizing provider: MD Parker Camara Sr would like a refill of the following medications:  ondansetron (ZOFRAN-ODT) 8 MG ODT tab [Esteban Daly MD]  cyanocobalamin (VITAMIN B12) 1000 MCG/ML injection [Esteban Daly MD]  lidocaine-prilocaine (EMLA) cream [Esteban Daly MD]    Preferred pharmacy: [facility] [place] [place] [address] [facility]    Comment:  WE will also be picking up his adderal and his valuim around 10am tomorrow Aug. 2 2017 any problems please let us know thank you

## 2017-08-02 NOTE — TELEPHONE ENCOUNTER
PA needed for:Ondansetron 8mg ODT  Insurance:[employer]  Insur phone:[phone]  Patient ID:[id number]  Please let us know when PA is granted/denied. Thank you!    Prior auth is needed for dosing, insurance will only allow #90 tablets every 75 days. Has had prior auth in the past for #90 to fill every 30 days.    Thank you,   -Kacie Alvarado, Pharmacy Technician, [facility] [phone]

## 2017-08-04 NOTE — PROGRESS NOTES
This is a recent snapshot of the patient's [facility] medical record.  For current drug dose and complete information and questions, call [phone] or In Basket pool, [facility]v home infusion ([id number])  CSN Number:  [id number]

## 2017-08-04 NOTE — PROGRESS NOTES
This is a recent snapshot of the patient's [facility] medical record.  For current drug dose and complete information and questions, call [phone] or In Basket pool, fv home infusion ([id number])  CSN Number:  [id number]

## 2017-08-06 ENCOUNTER — MYC MEDICAL ADVICE (OUTPATIENT)
Dept: PALLIATIVE MEDICINE | Facility: CLINIC | Age: 47
End: 2017-08-06

## 2017-08-06 DIAGNOSIS — G89.29 CHRONIC ABDOMINAL PAIN: ICD-10-CM

## 2017-08-06 DIAGNOSIS — R10.9 CHRONIC ABDOMINAL PAIN: ICD-10-CM

## 2017-08-06 DIAGNOSIS — Z79.891 ENCOUNTER FOR LONG-TERM OPIATE ANALGESIC USE: ICD-10-CM

## 2017-08-07 RX ORDER — ONDANSETRON 8 MG/1
TABLET, ORALLY DISINTEGRATING ORAL
Qty: 90 TABLET | Refills: 0 | OUTPATIENT
Start: 2017-08-07

## 2017-08-07 NOTE — TELEPHONE ENCOUNTER
Being processed in a separate encounter.    YADIRA LazarN, RN  Care Coordinator  [facility]

## 2017-08-07 NOTE — TELEPHONE ENCOUNTER
Duplicate.  Rx was sent to [facility] in [place] on 8/2/17 for a one year supply.    Khalida Beard RN

## 2017-08-07 NOTE — TELEPHONE ENCOUNTER
Chris from patient Created: 8/6/2017 12:16 PM     Hey I just wanted to let you know that I did Parker's Medication this month because I had mad him a white board chart where I went over what you had percribed for him where I was giving him 15ml every 4 hrs and than 10ml at 3am its been working great that way and also with that he has not taken any valume since I got him on a good schedule so is how I do it I give him 15mls @ 8am & 15mls @ 12pm & 15mls @  6pm & 15mls @ 11pm and than 10mls @ 3am I just wanted to let you know that is what I did not what he did but it has been working really good with no side affects at all if you have any questions feel free to call me at [phone] home or my cell # is [phone] and again thank you for every thing. Rose Acevedo  -------------------------------  Chris from patient Created: 8/6/2017 12:26 PM    Just to let you know Parker will need his percriptions for his oxycodone and his fentynal patches to be mailed over by Aug. 18th 2017 plus his he would also like you to get him Naloxone the jose can and to be sent [facility] in [facility] Thank you again for everthing

## 2017-08-07 NOTE — TELEPHONE ENCOUNTER
Meir,  Ninfa for the update.  At your appointment, the plan was for you to use Oxycodone 15mg THREE times during the day (what you previously had been doing) and an additional 10mg at night.  What you are currently doing is oxycodone 15mg FOUR times during the day and an addictional 10mg at night.    If you think this works really well, I think I would want to decrease the fentanyl patch some.  We have talked about risks with the higher doses of meds, and he is on a higher amount.  What do you think?  Option A: stay with fentanyl 50mcg/hr patch, and use oxycodone 15mg /15mg/15mg (three times) and an additional 10mg at night  OR  Option B: decrease the fentanyl and use oxycodone 15mg/15mg/15mg/15mg (four times) and an additional 10mg at night.    Jessica Milligan MD  [facility]

## 2017-08-07 NOTE — PROGRESS NOTES
This is a recent snapshot of the patient's [facility] medical record.  For current drug dose and complete information and questions, call [phone] or In Basket pool, fv home infusion ([id number])  CSN Number:  [id number]

## 2017-08-07 NOTE — TELEPHONE ENCOUNTER
Patient is using 70 mL per day of the oxyCODONE (ROXICODONE) 5 MG/5ML solution and needs it filled early. Script says no more than 55 mL/day. See [facility] message below for more information. Also reports losing 1 patch and is requesting to fill both patches and solution on 8/18. Fill dates changed to reflect patient request. Qty and Sig have not been adjusted. Routing to Dr. Milligan to review.

## 2017-08-07 NOTE — TELEPHONE ENCOUNTER
Received call from patient requesting refill(s) of oxyCODONE (ROXICODONE) 5 MG/5ML solution (Last picked up from pharmacy on 7/20/17     fentaNYL (DURAGESIC) 50 mcg/hr 72 hr patch (Last picked up from pharmacy on 7/21/17    Does not have Narcan on the medication list    Pt last seen by prescribing provider on 7/5/17  Next appt scheduled for 9/17/17    Last urine drug screen date 4/5/17  Current opioid agreement on file (completed within the last year) Yes Date of opioid agreement: 11/14/16        Corky Chatman MA  [facility]    Will route to nursing pool for review and preparation of prescription(s).

## 2017-08-07 NOTE — TELEPHONE ENCOUNTER
Medication refill information reviewed. Requesting prescription for Narcan as well.    Due date for     oxyCODONE (ROXICODONE) 5 MG/5ML solution is 8/21/17    fentaNYL (DURAGESIC) 50 mcg/hr 72 hr patch is 8/24/17    Prescriptions prepped for review.     Will route to provider.   -------------------------------------------------------------------------------------------------------------------------------    MyChart to patient     Esther Canales and Rose,    Thank you for all of the good information. I'll pass it on to DR. Milligan. I'm working on processing your refill request and you mentioned you needed the refills by 8/18. The dates I have for refills due are as follows:     oxyCODONE (ROXICODONE) 5 MG/5ML solution is 8/21/17    fentaNYL (DURAGESIC) 50 mcg/hr 72 hr patch is 8/24/17    I just wanted to check in with you since the due dates are after the 18th. I tried calling both numbers you provided but was unable to connect. I will wait for your response before processing these and will include the Narcan as well.    Thank you,  Jyothi Winchester, YADIRAN, RN  Care Coordinator  [facility] [place]

## 2017-08-08 NOTE — TELEPHONE ENCOUNTER
Spoke to Insurance rep.  PA was denied.  Drug is covered when pt meets one of the conditions:   -Hyperemesis gravidarum  -Undergoing radiation therapy  -Undergoing moderate to highly emetogenic chemotherapy    Faxed same appeals note as in 2/2017 to [phone]  Awaiting decision from appeals

## 2017-08-08 NOTE — PROGRESS NOTES
This is a recent snapshot of the patient's [facility] medical record.  For current drug dose and complete information and questions, call [phone] or In Basket pool, fv home infusion ([id number])  CSN Number:  [id number]

## 2017-08-09 ENCOUNTER — NURSE TRIAGE (OUTPATIENT)
Dept: NURSING | Facility: CLINIC | Age: 47
End: 2017-08-09

## 2017-08-09 RX ORDER — FENTANYL 50 UG/1
1 PATCH TRANSDERMAL
Qty: 15 PATCH | Refills: 0 | Status: SHIPPED | OUTPATIENT
Start: 2017-08-09 | End: 2017-09-05

## 2017-08-09 RX ORDER — OXYCODONE HCL 5 MG/5 ML
10-15 SOLUTION, ORAL ORAL EVERY 4 HOURS PRN
Qty: 1650 ML | Refills: 0 | Status: SHIPPED | OUTPATIENT
Start: 2017-08-09 | End: 2017-09-05

## 2017-08-09 NOTE — TELEPHONE ENCOUNTER
Script for Oxycodone and fentanyl patch was signed and mailed out to [facility]. Patient, notified.      Lyndsay Solo MA

## 2017-08-09 NOTE — TELEPHONE ENCOUNTER
Per patient, they want option A    Signed Prescriptions:                        Disp   Refills    oxyCODONE (ROXICODONE) 5 MG/5ML solution   1650 mL0        Sig: Take 10-15 mLs (10-15 mg) by mouth every 4 hours as           needed for moderate to severe pain Max of 55 mg           per day. Fill on/after 8/18/17 not to start till           8/18/17. Early fill due to change in dose.  Authorizing Provider: BRANDYN JENKINS    fentaNYL (DURAGESIC) 50 mcg/hr 72 hr patch 15 pat*0        Sig: Place 1 patch onto the skin every 48 hours MYLAN           BRAND ONLY. Fill on/after 8/18/17 to start           on/after 8/18/17  Authorizing Provider: BRANDYN JENKINS    naloxone (NARCAN) nasal spray              0.2 mL 0        Sig: Spray 1 spray (4 mg) into one nostril alternating           nostrils as needed for opioid reversal (every 2-3           minutes until assistance arrives.)  Authorizing Provider: BRANDYN JENKINS MD  [facility]

## 2017-08-09 NOTE — TELEPHONE ENCOUNTER
"  Reason for Disposition    Recent long-distance travel with prolonged time in car, bus, plane, or train (i.e., within past 2 weeks; 6 or  more hours duration)    Additional Information    Negative: Looks like a broken bone or dislocated joint (e.g., crooked or deformed)    Negative: Sounds like a life-threatening emergency to the triager    Negative: Followed a leg injury    Negative: Leg swelling is main symptom    Negative: Back pain radiating (shooting) into leg(s)    Negative: Knee pain is main symptom    Negative: Ankle pain is main symptom    Negative: Pregnant    Negative: Chest pain    Negative: Difficulty breathing    Negative: Entire foot is cool or blue in comparison to other side    Negative: Unable to walk    Negative: [1] Red area or streak AND [2] fever    Negative: [1] Swollen joint AND [2] fever    Negative: [1] Cast on leg or ankle AND [2] now increased pain    Negative: Patient sounds very sick or weak to the triager    Negative: [1] SEVERE pain (e.g., excruciating, unable to do any normal activities) AND [2] not improved after 2 hours of pain medicine     Pain > 10 on pain scale.    Negative: [1] Thigh or calf pain AND [2] only 1 side AND [3] present > 1 hour    Negative: [1] Thigh, calf, or ankle swelling AND [2] only 1 side    Negative: [1] Thigh, calf, or ankle swelling AND [2] bilateral AND [3] 1 side is more swollen    Negative: [1] Red area or streak AND [2] large (> 2 in. or 5 cm)    Negative: History of prior \"blood clot\" in leg or lungs (i.e., deep vein thrombosis, pulmonary embolism)    Negative: History of inherited increased risk of blood clots (e.g., Factor 5 Leiden, Anti-thrombin 3, Protein C or Protein S deficiency, Prothrombin mutation)    Negative: Recent illness requiring prolonged bedrest (i.e., immobilization)    Negative: Hip or leg fracture in past 2 months (e.g, or had cast on leg or ankle)    Negative: Major surgery in the past two months    Negative: Cancer treatment in " the past two months (or has cancer now)    Protocols used: LEG PAIN-ADULT-AH

## 2017-08-11 ENCOUNTER — TELEPHONE (OUTPATIENT)
Dept: FAMILY MEDICINE | Facility: CLINIC | Age: 47
End: 2017-08-11

## 2017-08-11 ENCOUNTER — MYC MEDICAL ADVICE (OUTPATIENT)
Dept: FAMILY MEDICINE | Facility: CLINIC | Age: 47
End: 2017-08-11

## 2017-08-11 NOTE — PROGRESS NOTES
This is a recent snapshot of the patient's [facility] medical record.  For current drug dose and complete information and questions, call [phone] or In Basket pool, fv home infusion ([id number])  CSN Number:  [id number]

## 2017-08-11 NOTE — TELEPHONE ENCOUNTER
Call and discussed with the home nurse. It appears that he is developing a small abscess around the port. I recommended attention in the emergency room so that this can be drained, cultured and antibiotics started. Home nursing agreed.    Esteban Daly MD

## 2017-08-11 NOTE — TELEPHONE ENCOUNTER
Patient has a pus pocket by the port access site, and they need advice about this as soon as possible.   Please call back as soon as we can per nurse. Non urgent for immediate transfer but call asap.

## 2017-08-11 NOTE — TELEPHONE ENCOUNTER
Please inform the patient that the home care nurse has already been contacted. Recommendations are for an emergency room visit to have the area opened and drained and subsequently cultured with antibiotics to begin. Believe he could go to [facility] for care if not feeling ill.    Esteban Daly MD

## 2017-08-13 LAB
ALBUMIN SERPL-MCNC: 3.2 G/DL (ref 3.4–5)
ALP SERPL-CCNC: 72 U/L (ref 40–150)
ALT SERPL W P-5'-P-CCNC: 24 U/L (ref 0–70)
ANION GAP SERPL CALCULATED.3IONS-SCNC: 8 MMOL/L (ref 3–14)
AST SERPL W P-5'-P-CCNC: 24 U/L (ref 0–45)
BASOPHILS # BLD AUTO: 0 10E9/L (ref 0–0.2)
BASOPHILS NFR BLD AUTO: 0.3 %
BILIRUB DIRECT SERPL-MCNC: <0.1 MG/DL (ref 0–0.2)
BILIRUB SERPL-MCNC: 0.3 MG/DL (ref 0.2–1.3)
BUN SERPL-MCNC: 11 MG/DL (ref 7–30)
CALCIUM SERPL-MCNC: 8.2 MG/DL (ref 8.5–10.1)
CHLORIDE SERPL-SCNC: 112 MMOL/L (ref 94–109)
CO2 SERPL-SCNC: 26 MMOL/L (ref 20–32)
CREAT SERPL-MCNC: 0.71 MG/DL (ref 0.66–1.25)
CRP SERPL-MCNC: <2.9 MG/L (ref 0–8)
DIFFERENTIAL METHOD BLD: ABNORMAL
EOSINOPHIL # BLD AUTO: 0.4 10E9/L (ref 0–0.7)
EOSINOPHIL NFR BLD AUTO: 5.8 %
ERYTHROCYTE [DISTWIDTH] IN BLOOD BY AUTOMATED COUNT: 15.5 % (ref 10–15)
FERRITIN SERPL-MCNC: 10 NG/ML (ref 26–388)
GFR SERPL CREATININE-BSD FRML MDRD: ABNORMAL ML/MIN/1.7M2
GLUCOSE SERPL-MCNC: 58 MG/DL (ref 70–99)
HCT VFR BLD AUTO: 36.2 % (ref 40–53)
HGB BLD-MCNC: 11.8 G/DL (ref 13.3–17.7)
IMM GRANULOCYTES # BLD: 0 10E9/L (ref 0–0.4)
IMM GRANULOCYTES NFR BLD: 0.1 %
IRON SERPL-MCNC: 22 UG/DL (ref 35–180)
LYMPHOCYTES # BLD AUTO: 2.2 10E9/L (ref 0.8–5.3)
LYMPHOCYTES NFR BLD AUTO: 33.4 %
MAGNESIUM SERPL-MCNC: 2 MG/DL (ref 1.6–2.3)
MCH RBC QN AUTO: 29.2 PG (ref 26.5–33)
MCHC RBC AUTO-ENTMCNC: 32.6 G/DL (ref 31.5–36.5)
MCV RBC AUTO: 90 FL (ref 78–100)
MONOCYTES # BLD AUTO: 0.5 10E9/L (ref 0–1.3)
MONOCYTES NFR BLD AUTO: 7.9 %
NEUTROPHILS # BLD AUTO: 3.5 10E9/L (ref 1.6–8.3)
NEUTROPHILS NFR BLD AUTO: 52.5 %
NRBC # BLD AUTO: 0 10*3/UL
NRBC BLD AUTO-RTO: 0 /100
PHOSPHATE SERPL-MCNC: 2.3 MG/DL (ref 2.5–4.5)
PLATELET # BLD AUTO: 192 10E9/L (ref 150–450)
POTASSIUM SERPL-SCNC: 3.5 MMOL/L (ref 3.4–5.3)
PREALB SERPL IA-MCNC: 21 MG/DL (ref 15–45)
PROT SERPL-MCNC: 6.9 G/DL (ref 6.8–8.8)
RBC # BLD AUTO: 4.04 10E12/L (ref 4.4–5.9)
SODIUM SERPL-SCNC: 146 MMOL/L (ref 133–144)
TRIGL SERPL-MCNC: 90 MG/DL
WBC # BLD AUTO: 6.7 10E9/L (ref 4–11)

## 2017-08-13 PROCEDURE — 84134 ASSAY OF PREALBUMIN: CPT | Performed by: SURGERY

## 2017-08-13 PROCEDURE — 84478 ASSAY OF TRIGLYCERIDES: CPT | Performed by: SURGERY

## 2017-08-13 PROCEDURE — 84630 ASSAY OF ZINC: CPT | Performed by: SURGERY

## 2017-08-13 PROCEDURE — 83735 ASSAY OF MAGNESIUM: CPT | Performed by: SURGERY

## 2017-08-13 PROCEDURE — 85025 COMPLETE CBC W/AUTO DIFF WBC: CPT | Performed by: SURGERY

## 2017-08-13 PROCEDURE — 84100 ASSAY OF PHOSPHORUS: CPT | Performed by: SURGERY

## 2017-08-13 PROCEDURE — 80053 COMPREHEN METABOLIC PANEL: CPT | Performed by: SURGERY

## 2017-08-13 PROCEDURE — 82525 ASSAY OF COPPER: CPT | Performed by: SURGERY

## 2017-08-13 PROCEDURE — 84255 ASSAY OF SELENIUM: CPT | Performed by: SURGERY

## 2017-08-13 PROCEDURE — 82248 BILIRUBIN DIRECT: CPT | Performed by: SURGERY

## 2017-08-13 PROCEDURE — 86140 C-REACTIVE PROTEIN: CPT | Performed by: SURGERY

## 2017-08-13 PROCEDURE — 82728 ASSAY OF FERRITIN: CPT | Performed by: SURGERY

## 2017-08-13 PROCEDURE — 83540 ASSAY OF IRON: CPT | Performed by: SURGERY

## 2017-08-13 PROCEDURE — 83785 ASSAY OF MANGANESE: CPT | Performed by: SURGERY

## 2017-08-15 LAB
COPPER SERPL-MCNC: 129 UG/DL (ref 70–140)
SELENIUM SERPL-MCNC: 106 UG/L (ref 23–190)
ZINC SERPL-MCNC: 68 UG/DL (ref 60–120)

## 2017-08-15 NOTE — PROGRESS NOTES
This is a recent snapshot of the patient's [facility] medical record.  For current drug dose and complete information and questions, call [phone] or In Basket pool, fv home infusion ([id number])  CSN Number:  [id number]

## 2017-08-15 NOTE — PROGRESS NOTES
This is a recent snapshot of the patient's [facility] medical record.  For current drug dose and complete information and questions, call [phone] or In Basket pool, fv home infusion ([id number])  CSN Number:  [id number]

## 2017-08-18 LAB — MANGANESE SERPL-MCNC: <1 UG/L (ref 0–2)

## 2017-08-28 NOTE — PROGRESS NOTES
This is a recent snapshot of the patient's [facility] medical record.  For current drug dose and complete information and questions, call [phone] or In Basket pool, fv home infusion ([id number])  CSN Number:  [id number]

## 2017-08-31 ENCOUNTER — TRANSFERRED RECORDS (OUTPATIENT)
Dept: HEALTH INFORMATION MANAGEMENT | Facility: CLINIC | Age: 47
End: 2017-08-31

## 2017-09-01 ENCOUNTER — CARE COORDINATION (OUTPATIENT)
Dept: CARE COORDINATION | Facility: CLINIC | Age: 47
End: 2017-09-01

## 2017-09-01 NOTE — PROGRESS NOTES
Clinic Care Coordination Contact  Care Team Conversations    RN CC spoke with patient's spouse who reports patient is doing very well and has no signs or symptoms of infection of his port or G-tube site.  Spouse Rose was pleased to inform writer that they will be moving to [place], [place] in October or November.  This comes to much relief to patient and his spouse, as they have been struggling with their housing situation.  Rose would appreciate ongoing care coordination on a monthly basis for patient and will contact writer if future needs or questions arise.    Melissa Behl BSN, RN, N  [facility] Care Coordinator  [phone]  [email or web address]

## 2017-09-03 ENCOUNTER — MYC MEDICAL ADVICE (OUTPATIENT)
Dept: PALLIATIVE MEDICINE | Facility: CLINIC | Age: 47
End: 2017-09-03

## 2017-09-03 DIAGNOSIS — Z79.891 ENCOUNTER FOR LONG-TERM OPIATE ANALGESIC USE: ICD-10-CM

## 2017-09-03 DIAGNOSIS — G89.29 CHRONIC ABDOMINAL PAIN: ICD-10-CM

## 2017-09-03 DIAGNOSIS — R10.9 CHRONIC ABDOMINAL PAIN: ICD-10-CM

## 2017-09-05 NOTE — TELEPHONE ENCOUNTER
Received call from patient requesting refill(s) of oxyCODONE (ROXICODONE) 5 MG/5ML solution and fentaNYL (DURAGESIC) 50 mcg/hr 72 hr patch     Last picked up from pharmacy both on 8/18/2017    Pt last seen by prescribing provider on 7/5/2017  Next appt scheduled for 9/18/2017    Last urine drug screen date 4/5/2017  Current opioid agreement on file (completed within the last year) Yes Date of opioid agreement: 11/14/2016    Processing (pick one and delete the others):      Mail to     [facility] [address] [address]  Phone: [phone] Fax: [phone]    Will route to nursing pool for review and preparation of prescription(s).     Cristin Bradley, [facility]

## 2017-09-05 NOTE — TELEPHONE ENCOUNTER
Medication refill information reviewed.     Due date for oxyCODONE (ROXICODONE) 5 MG/5ML solution and fentaNYL (DURAGESIC) 50 mcg/hr 72 hr patch  is 9/17/17     Prescriptions prepped for review.     Will route to provider.

## 2017-09-06 RX ORDER — FENTANYL 50 UG/1
1 PATCH TRANSDERMAL
Qty: 15 PATCH | Refills: 0 | Status: SHIPPED | OUTPATIENT
Start: 2017-09-06 | End: 2017-10-05

## 2017-09-06 RX ORDER — OXYCODONE HCL 5 MG/5 ML
10-15 SOLUTION, ORAL ORAL EVERY 4 HOURS PRN
Qty: 1650 ML | Refills: 0 | Status: SHIPPED | OUTPATIENT
Start: 2017-09-06 | End: 2017-10-05

## 2017-09-06 NOTE — TELEPHONE ENCOUNTER
Signed Prescriptions:                        Disp   Refills    oxyCODONE (ROXICODONE) 5 MG/5ML solution   1650 mL0        Sig: Take 10-15 mLs (10-15 mg) by mouth every 4 hours as           needed for moderate to severe pain Max of 55 mg           per day. Fill on/after 9/15/17 not to start till           9/17/17.  Authorizing Provider: BRANDYN JENKINS    fentaNYL (DURAGESIC) 50 mcg/hr 72 hr patch 15 pat*0        Sig: Place 1 patch onto the skin every 48 hours MYLAN           BRAND ONLY. Fill on/after 9/15/17 to start           on/after 9/17/17  Authorizing Provider: BRANDYN JENKINS MD  [facility]

## 2017-09-06 NOTE — TELEPHONE ENCOUNTER
The signed oxycodone and fentanyl scripts(s) were mailed to:    [facility] [place], [place] - [address] [place] [address]  Phone: [phone] Fax: [phone]    Pt informed of this via [facility].    Demetrice Yeh RN-BSN  [facility]

## 2017-09-08 ENCOUNTER — TELEPHONE (OUTPATIENT)
Dept: FAMILY MEDICINE | Facility: CLINIC | Age: 47
End: 2017-09-08

## 2017-09-08 DIAGNOSIS — E61.1 LOW SERUM IRON: Primary | ICD-10-CM

## 2017-09-08 PROBLEM — D50.9 ANEMIA, IRON DEFICIENCY: Status: ACTIVE | Noted: 2017-09-08

## 2017-09-08 NOTE — TELEPHONE ENCOUNTER
Will need IV iron infusion --initial therapy to be at an infusion center. Orders have been placed for Injectafer--two doses--through the Infusion Center    Will contact the patient that orders are in and to schedule with the infusion center at [facility]. Working with [facility] for ongoing maintenance therapy.    Will My chart the patient about scheduling.    Esteban Daly MD

## 2017-09-08 NOTE — TELEPHONE ENCOUNTER
----- Message from Li Cope [facility] sent at 8/31/2017  2:04 PM CDT -----  Regarding: Low iron level  Hi, Dr. Daly,  Just wanted to alert you to Parker's low iron level drawn on 8/13/17. It's included in our routine trace element monitoring. We cannot provide iron in TPN as it is not compatible. IV iron can be provided as a separate infusion. If he has insurance coverage, we could provide IV iron at home as long as the patient first has an infusion in an infusion center. I could not find any history indicating that Parker has had IV iron in the past. Let me know if you have questions.  [facility]  [phone]

## 2017-09-11 DIAGNOSIS — F41.9 CHRONIC ANXIETY: ICD-10-CM

## 2017-09-11 RX ORDER — DIAZEPAM 5 MG
2.5 TABLET ORAL DAILY PRN
Qty: 30 TABLET | Refills: 0 | Status: CANCELLED | OUTPATIENT
Start: 2017-09-11

## 2017-09-11 NOTE — TELEPHONE ENCOUNTER
Controlled Substance Refill Request for diazepam (VALIUM) 5 MG tablet  Problem List Complete:  Yes    Last Written Prescription Date:  8/1/17  Last Fill Quantity: 30,   # refills: 0    Last Office Visit with [facility] primary care provider: 8/1/17    Clinic visit frequency required: Q 1 month     Future Office visit:   Next 5 appointments (look out 90 days)     Sep 18, 2017 11:00 AM CDT   Return Visit with Patti Milligan MD   [facility] ([facility])    [address] [id number] [phone]            Oct 26, 2017  1:00 PM CDT   Return Visit with Denis Cancino MD   [facility] ([address] [address] [phone]                  Controlled substance agreement on file: Yes:  Date 9/11/17.     Processing:  Patient will  in clinic     checked in past 6 months?  Yes yes

## 2017-09-12 ENCOUNTER — MYC MEDICAL ADVICE (OUTPATIENT)
Dept: FAMILY MEDICINE | Facility: CLINIC | Age: 47
End: 2017-09-12

## 2017-09-12 DIAGNOSIS — F41.9 ANXIETY: Primary | ICD-10-CM

## 2017-09-12 RX ORDER — DIAZEPAM 5 MG
5 TABLET ORAL EVERY 12 HOURS PRN
Qty: 10 TABLET | Refills: 0 | Status: ON HOLD | OUTPATIENT
Start: 2017-09-12 | End: 2017-09-20

## 2017-09-13 ENCOUNTER — INFUSION THERAPY VISIT (OUTPATIENT)
Dept: INFUSION THERAPY | Facility: CLINIC | Age: 47
End: 2017-09-13
Attending: FAMILY MEDICINE
Payer: COMMERCIAL

## 2017-09-13 VITALS
RESPIRATION RATE: 18 BRPM | BODY MASS INDEX: 27.39 KG/M2 | SYSTOLIC BLOOD PRESSURE: 114 MMHG | TEMPERATURE: 98 F | DIASTOLIC BLOOD PRESSURE: 71 MMHG | HEART RATE: 80 BPM | OXYGEN SATURATION: 100 % | WEIGHT: 196.4 LBS

## 2017-09-13 DIAGNOSIS — D50.9 ANEMIA, IRON DEFICIENCY: ICD-10-CM

## 2017-09-13 DIAGNOSIS — E61.1 LOW SERUM IRON: Primary | ICD-10-CM

## 2017-09-13 PROCEDURE — 96365 THER/PROPH/DIAG IV INF INIT: CPT

## 2017-09-13 PROCEDURE — 25000128 H RX IP 250 OP 636: Performed by: FAMILY MEDICINE

## 2017-09-13 RX ORDER — HEPARIN SODIUM (PORCINE) LOCK FLUSH IV SOLN 100 UNIT/ML 100 UNIT/ML
500 SOLUTION INTRAVENOUS EVERY 8 HOURS
Status: CANCELLED
Start: 2017-09-13

## 2017-09-13 RX ORDER — HEPARIN SODIUM (PORCINE) LOCK FLUSH IV SOLN 100 UNIT/ML 100 UNIT/ML
500 SOLUTION INTRAVENOUS EVERY 8 HOURS
Status: DISCONTINUED | OUTPATIENT
Start: 2017-09-13 | End: 2017-09-13 | Stop reason: HOSPADM

## 2017-09-13 RX ADMIN — FERRIC CARBOXYMALTOSE INJECTION 750 MG: 50 INJECTION, SOLUTION INTRAVENOUS at 13:35

## 2017-09-13 RX ADMIN — SODIUM CHLORIDE, PRESERVATIVE FREE 500 UNITS: 5 INJECTION INTRAVENOUS at 14:25

## 2017-09-13 RX ADMIN — SODIUM CHLORIDE 250 ML: 9 INJECTION, SOLUTION INTRAVENOUS at 13:30

## 2017-09-13 ASSESSMENT — PAIN SCALES - GENERAL: PAINLEVEL: NO PAIN (0)

## 2017-09-13 NOTE — TELEPHONE ENCOUNTER
Reviewed--    Should have received a small supply ( 10 tablets ) yesterday to help with the infusion of iron.     As we discussed on 8/1/2017, Valium was to be discontinued for the long term due to the opioid therapy. Will not be refilling additional Valium prescriptions.    Esteban Daly MD  Please close encounter when call/work completed.

## 2017-09-13 NOTE — PROGRESS NOTES
Patient here for 1st dose of injectafer. Port already accessed. Positive blood return when flushed. Noted dressing partially off, exposing port needle. Dressing change completed for port. Tolerated infusion well. Observed for 30 minutes after. [facility] called to inform them patient did well. Plan for next dose at home on 9/20/17.

## 2017-09-13 NOTE — MR AVS SNAPSHOT
After Visit Summary   9/13/2017    Parker Acevedo Sr    MRN: [id number]           Patient Information     Date Of Birth          1970        Visit Information        Provider Department      9/13/2017 1:00 PM [facility] INFUSION CHAIR 4 [facility]        Today's Diagnoses     Low serum iron    -  1    Anemia, iron deficiency          Care Instructions    Home care to follow.          Follow-ups after your visit        Your next 10 appointments already scheduled     Sep 18, 2017 11:00 AM CDT   Return Visit with Patti Milligan MD   [facility] ([facility])    [address]  [place] [address] [phone]              Who to contact     If you have questions or need follow up information about today's clinic visit or your schedule please contact [facility] IN[facility]ION [facility] directly at [phone].  Normal or non-critical lab and imaging results will be communicated to you by MyChart, letter or phone within 4 business days after the clinic has received the results. If you do not hear from us within 7 days, please contact the clinic through [facility] or phone. If you have a critical or abnormal lab result, we will notify you by phone as soon as possible.  Submit refill requests through [employer] or call your pharmacy and they will forward the refill request to us. Please allow 3 business days for your refill to be completed.          Additional Information About Your Visit        MyChart Information     [employer] gives you secure access to your electronic health record. If you see a primary care provider, you can also send messages to your care team and make appointments. If you have questions, please call your primary care clinic.  If you do not have a primary care provider, please call [phone] and they will assist you.        Care EveryWhere ID     This is your Care EveryWhere ID. This could be used by other  organizations to access your [facility] medical records  [id number]        Your Vitals Were     Pulse Temperature Respirations Pulse Oximetry BMI (Body Mass Index)       80 98  F (36.7  C) (Temporal) 18 100% 27.39 kg/m2        Blood Pressure from Last 3 Encounters:   09/13/17 114/71   08/01/17 128/74   07/24/17 114/68    Weight from Last 3 Encounters:   09/13/17 89.1 kg (196 lb 6.4 oz)   08/01/17 79.4 kg (175 lb 1.6 oz)   07/24/17 78.9 kg (174 lb)              Today, you had the following     No orders found for display       Primary Care Provider Office Phone # Fax #    Esteban Daly [phone] [address] [address]        Equal Access to Services     WENDY GUZMAN : Hadii kameron huff hadasho Soomaali, waaxda luqadaha, qaybta kaalmada adeegyada, carmela dumont . So [facility] [phone].    ATENCIÓN: Si habla español, tiene a hicks disposición servicios gratuitos de asistencia lingüística. Chu al [phone].    We comply with applicable federal civil rights laws and [place] laws. We do not discriminate on the basis of race, color, national origin, age, disability sex, sexual orientation or gender identity.            Thank you!     Thank you for choosing [facility]  for your care. Our goal is always to provide you with excellent care. Hearing back from our patients is one way we can continue to improve our services. Please take a few minutes to complete the written survey that you may receive in the mail after your visit with us. Thank you!             Your Updated Medication List - Protect others around you: Learn how to safely use, store and throw away your medicines at www.disposemymeds.org.          This list is accurate as of: 9/13/17  3:51 PM.  Always use your most recent med list.                   Brand Name Dispense Instructions for use Diagnosis    albuterol 108 (90 BASE) MCG/ACT Inhaler    PROAIR HFA/PROVENTIL  "HFA/VENTOLIN HFA    1 Inhaler    Inhale 2 puffs into the lungs every 4 hours as needed for shortness of breath / dyspnea or wheezing    Aspiration pneumonitis (H)       * amphetamine-dextroamphetamine 20 MG per tablet    ADDERALL    60 tablet    Take 1 tablet (20 mg) by mouth 2 times daily    ADHD (attention deficit hyperactivity disorder), inattentive type       * amphetamine-dextroamphetamine 20 MG per tablet    ADDERALL    60 tablet    Take 1 tablet (20 mg) by mouth 2 times daily    ADHD (attention deficit hyperactivity disorder), inattentive type       * amphetamine-dextroamphetamine 20 MG per tablet   Start taking on:  10/2/2017    ADDERALL    60 tablet    Take 1 tablet (20 mg) by mouth 2 times daily    ADHD (attention deficit hyperactivity disorder), inattentive type       carvedilol 3.125 MG tablet    COREG    60 tablet    Take 1 tablet (3.125 mg) by mouth 2 times daily (with meals)    Cardiomyopathy, unspecified (H)       cyanocobalamin 1000 MCG/ML injection    VITAMIN B12    1 mL    Inject 1 mL (1,000 mcg) into the muscle every 30 days    Bariatric surgery status       * diazepam 5 MG tablet    VALIUM    30 tablet    Take 0.5 tablets (2.5 mg) by mouth daily as needed for anxiety or sleep    Chronic anxiety       * diazepam 5 MG tablet    VALIUM    10 tablet    Take 1 tablet (5 mg) by mouth every 12 hours as needed for anxiety or sleep    Anxiety       * [facility]ED PATIENT      Contact [facility] for patient specific medication information at [phone] on admission and discharge from the hospital.  Phones are answered 24 hours a day 7 days a week 365 days a year.  Providers - Choose \"CONTINUE HOME MED (no script)\" at discharge if patient treatment with home infusion will continue.    S/P bariatric surgery       * [facility]      Contact [facility] for patient specific medication information at [phone] on admission and " "discharge from the hospital.  Phones are answered 24 hours a day 7 days a week 365 days a year.  Providers - Choose \"CONTINUE HOME MED (no script)\" at discharge if patient treatment with home infusion will continue.    Severe malnutrition (H)       fentaNYL 50 mcg/hr 72 hr patch    DURAGESIC    15 patch    Place 1 patch onto the skin every 48 hours MYLAN BRAND ONLY. Fill on/after 9/15/17 to start on/after 9/17/17    Chronic abdominal pain       lidocaine-prilocaine cream    EMLA    30 g    Apply topically as needed for moderate pain    Pain following surgery or procedure       morphine 0.1% in intrasite topical gel     100 g    Apply as needed prior to accessing the port site.    Pain following surgery or procedure       mupirocin 2 % ointment    BACTROBAN    30 g    Apply topically 3 times daily    Skin infection       naloxone nasal spray    NARCAN    0.2 mL    Spray 1 spray (4 mg) into one nostril alternating nostrils as needed for opioid reversal (every 2-3 minutes until assistance arrives.)    Encounter for long-term opiate analgesic use, Chronic abdominal pain       Needle (Disp) 27G X 1/2\" Misc    BD DISP NEEDLES    3 each    1 Device every 30 days Use for cyanocobalamin injection once q 30 days.    Bariatric surgery status       nystatin-triamcinolone cream    MYCOLOG II    60 g    Apply topically 2 times daily as needed    Skin irritation       ondansetron 8 MG ODT tab    ZOFRAN-ODT    90 tablet    Take 1 tablet (8 mg) by mouth every 8 hours as needed for nausea    Nausea       * order for DME     12 each    Injection Supplies for Vitamin B12: 3cc syringes w/ 27 gauge needles, 1/2 inch length    Bariatric surgery status       * order for DME     4 each    Equipment being ordered: Bilateral knee high chronic venous insufficiency stockings--  mild-moderate pressures.    Bilateral edema of lower extremity       oxyCODONE 5 MG/5ML solution    ROXICODONE    1650 mL    Take 10-15 mLs (10-15 mg) by mouth every 4 " hours as needed for moderate to severe pain Max of 55 mg per day. Fill on/after 9/15/17 not to start till 9/17/17.    Encounter for long-term opiate analgesic use       senna-docusate 8.6-50 MG per tablet    SENOKOT-S;PERICOLACE    120 tablet    Take 1-2 tablets by mouth 2 times daily as needed for constipation    Slow transit constipation       sucralfate 1 GM/10ML suspension    CARAFATE    1200 mL    Take 10 mLs (1 g) by mouth 4 times daily    Nausea       vitamin D 70441 UNIT capsule    ERGOCALCIFEROL    12 capsule    Take 1 capsule (50,000 Units) by mouth every 7 days    Vitamin D deficiency       * Notice:  This list has 9 medication(s) that are the same as other medications prescribed for you. Read the directions carefully, and ask your doctor or other care provider to review them with you.

## 2017-09-15 LAB
ALBUMIN SERPL-MCNC: 3.3 G/DL (ref 3.4–5)
ALP SERPL-CCNC: 72 U/L (ref 40–150)
ALT SERPL W P-5'-P-CCNC: 23 U/L (ref 0–70)
ANION GAP SERPL CALCULATED.3IONS-SCNC: 8 MMOL/L (ref 3–14)
AST SERPL W P-5'-P-CCNC: 15 U/L (ref 0–45)
BASOPHILS # BLD AUTO: 0 10E9/L (ref 0–0.2)
BASOPHILS NFR BLD AUTO: 0.4 %
BILIRUB DIRECT SERPL-MCNC: <0.1 MG/DL (ref 0–0.2)
BILIRUB SERPL-MCNC: 0.4 MG/DL (ref 0.2–1.3)
BUN SERPL-MCNC: 15 MG/DL (ref 7–30)
CALCIUM SERPL-MCNC: 7.9 MG/DL (ref 8.5–10.1)
CHLORIDE SERPL-SCNC: 112 MMOL/L (ref 94–109)
CO2 SERPL-SCNC: 24 MMOL/L (ref 20–32)
CREAT SERPL-MCNC: 0.66 MG/DL (ref 0.66–1.25)
DIFFERENTIAL METHOD BLD: ABNORMAL
EOSINOPHIL # BLD AUTO: 0.3 10E9/L (ref 0–0.7)
EOSINOPHIL NFR BLD AUTO: 4 %
ERYTHROCYTE [DISTWIDTH] IN BLOOD BY AUTOMATED COUNT: 16.1 % (ref 10–15)
GFR SERPL CREATININE-BSD FRML MDRD: >90 ML/MIN/1.7M2
GLUCOSE SERPL-MCNC: 156 MG/DL (ref 70–99)
HCT VFR BLD AUTO: 39 % (ref 40–53)
HGB BLD-MCNC: 12.7 G/DL (ref 13.3–17.7)
IMM GRANULOCYTES # BLD: 0 10E9/L (ref 0–0.4)
IMM GRANULOCYTES NFR BLD: 0.2 %
LYMPHOCYTES # BLD AUTO: 1.7 10E9/L (ref 0.8–5.3)
LYMPHOCYTES NFR BLD AUTO: 21.4 %
MAGNESIUM SERPL-MCNC: 1.8 MG/DL (ref 1.6–2.3)
MCH RBC QN AUTO: 29.6 PG (ref 26.5–33)
MCHC RBC AUTO-ENTMCNC: 32.6 G/DL (ref 31.5–36.5)
MCV RBC AUTO: 91 FL (ref 78–100)
MONOCYTES # BLD AUTO: 1 10E9/L (ref 0–1.3)
MONOCYTES NFR BLD AUTO: 12.7 %
NEUTROPHILS # BLD AUTO: 4.9 10E9/L (ref 1.6–8.3)
NEUTROPHILS NFR BLD AUTO: 61.3 %
NRBC # BLD AUTO: 0 10*3/UL
NRBC BLD AUTO-RTO: 0 /100
PHOSPHATE SERPL-MCNC: 2.2 MG/DL (ref 2.5–4.5)
PLATELET # BLD AUTO: 166 10E9/L (ref 150–450)
POTASSIUM SERPL-SCNC: 3 MMOL/L (ref 3.4–5.3)
PREALB SERPL IA-MCNC: 23 MG/DL (ref 15–45)
PROT SERPL-MCNC: 7.1 G/DL (ref 6.8–8.8)
RBC # BLD AUTO: 4.29 10E12/L (ref 4.4–5.9)
SODIUM SERPL-SCNC: 144 MMOL/L (ref 133–144)
TRIGL SERPL-MCNC: 110 MG/DL
WBC # BLD AUTO: 8 10E9/L (ref 4–11)

## 2017-09-15 PROCEDURE — 83735 ASSAY OF MAGNESIUM: CPT | Performed by: SURGERY

## 2017-09-15 PROCEDURE — 84478 ASSAY OF TRIGLYCERIDES: CPT | Performed by: SURGERY

## 2017-09-15 PROCEDURE — 82248 BILIRUBIN DIRECT: CPT | Performed by: SURGERY

## 2017-09-15 PROCEDURE — 84100 ASSAY OF PHOSPHORUS: CPT | Performed by: SURGERY

## 2017-09-15 PROCEDURE — 80053 COMPREHEN METABOLIC PANEL: CPT | Performed by: SURGERY

## 2017-09-15 PROCEDURE — 85025 COMPLETE CBC W/AUTO DIFF WBC: CPT | Performed by: SURGERY

## 2017-09-15 PROCEDURE — 84134 ASSAY OF PREALBUMIN: CPT | Performed by: SURGERY

## 2017-09-16 ENCOUNTER — MYC REFILL (OUTPATIENT)
Dept: FAMILY MEDICINE | Facility: CLINIC | Age: 47
End: 2017-09-16

## 2017-09-16 DIAGNOSIS — L08.9 SKIN INFECTION: ICD-10-CM

## 2017-09-16 DIAGNOSIS — Z98.84 BARIATRIC SURGERY STATUS: ICD-10-CM

## 2017-09-16 RX ORDER — CYANOCOBALAMIN 1000 UG/ML
1 INJECTION, SOLUTION INTRAMUSCULAR; SUBCUTANEOUS
Qty: 1 ML | Refills: 11 | Status: CANCELLED | OUTPATIENT
Start: 2017-09-16

## 2017-09-18 ENCOUNTER — OFFICE VISIT (OUTPATIENT)
Dept: PALLIATIVE MEDICINE | Facility: CLINIC | Age: 47
End: 2017-09-18
Payer: COMMERCIAL

## 2017-09-18 VITALS
SYSTOLIC BLOOD PRESSURE: 121 MMHG | HEART RATE: 76 BPM | BODY MASS INDEX: 27.89 KG/M2 | WEIGHT: 200 LBS | DIASTOLIC BLOOD PRESSURE: 80 MMHG

## 2017-09-18 DIAGNOSIS — R10.9 CHRONIC ABDOMINAL PAIN: Primary | ICD-10-CM

## 2017-09-18 DIAGNOSIS — G89.4 CHRONIC PAIN SYNDROME: ICD-10-CM

## 2017-09-18 DIAGNOSIS — G89.29 CHRONIC ABDOMINAL PAIN: Primary | ICD-10-CM

## 2017-09-18 DIAGNOSIS — K90.829 SHORT GUT SYNDROME: ICD-10-CM

## 2017-09-18 PROCEDURE — 99284 EMERGENCY DEPT VISIT MOD MDM: CPT | Mod: Z6 | Performed by: EMERGENCY MEDICINE

## 2017-09-18 PROCEDURE — 99214 OFFICE O/P EST MOD 30 MIN: CPT | Performed by: PSYCHIATRY & NEUROLOGY

## 2017-09-18 RX ORDER — MUPIROCIN 20 MG/G
OINTMENT TOPICAL 3 TIMES DAILY
Qty: 30 G | Refills: 3 | Status: SHIPPED | OUTPATIENT
Start: 2017-09-18 | End: 2017-11-03

## 2017-09-18 ASSESSMENT — PAIN SCALES - GENERAL: PAINLEVEL: MILD PAIN (3)

## 2017-09-18 NOTE — PATIENT INSTRUCTIONS
1. Schedule follow up in 3 months.    ----------------------------------------------------------------  Nurse Triage line:  [phone]   Call this number with any questions or concerns. You may leave a detailed message anytime. Calls are typically returned Monday through Friday between 8 AM and 4:30 PM. We usually get back to you within 2 business days depending on the issue/request.       Medication refills:    For non-narcotic medications, call your pharmacy directly to request a refill. The pharmacy will contact the [facility] for authorization. Please allow 3-4 days for these refills to be processed.     For narcotic refills, call the nurse triage line or send a [employer] message. Please contact us 7-10 days before your refill is due. The message MUST include the name of the specific medication(s) requested and how you would like to receive the prescription(s). The options are as follows:    Pain Clinic staff can mail the prescription to your pharmacy. Please tell us the name of the pharmacy.    You may pick the prescription up at the Pain Clinic (tell us the location) or during a clinic visit with your pain provider    Pain Clinic staff can deliver the prescription to the [place] pharmacy in the clinic building. Please tell us the location.      Scheduling number: [phone].  Call this number to schedule or change appointments.    We believe regular attendance is key to your success in our program.    Any time you are unable to keep your appointment we ask that you call us at least 24 hours in advance to let us know. This will allow us to offer the appointment time to another patient.

## 2017-09-18 NOTE — TELEPHONE ENCOUNTER
Message from XOXO Kitchenhart:  Original authorizing provider: MD Parker Camara Sr would like a refill of the following medications:  cyanocobalamin (VITAMIN B12) 1000 MCG/ML injection [Esteban Daly MD]    Preferred pharmacy: [place], [place] - [address] [place] [address]    Comment:      Medication renewals requested in this message routed to other providers:  mupirocin (BACTROBAN) 2 % ointment [Lizbeth Lester, APRN CNP]

## 2017-09-18 NOTE — MR AVS SNAPSHOT
After Visit Summary   9/18/2017    Parker Acevedo Sr    MRN: [id number]           Patient Information     Date Of Birth          1970        Visit Information        Provider Department      9/18/2017 11:00 AM Patti Milligan MD [facility]        Care Instructions    1. Schedule follow up in 3 months.    ----------------------------------------------------------------  Nurse Triage line:  [phone]   Call this number with any questions or concerns. You may leave a detailed message anytime. Calls are typically returned Monday through Friday between 8 AM and 4:30 PM. We usually get back to you within 2 business days depending on the issue/request.       Medication refills:    For non-narcotic medications, call your pharmacy directly to request a refill. The pharmacy will contact the [facility] for authorization. Please allow 3-4 days for these refills to be processed.     For narcotic refills, call the nurse triage line or send a [employer] message. Please contact us 7-10 days before your refill is due. The message MUST include the name of the specific medication(s) requested and how you would like to receive the prescription(s). The options are as follows:    Pain Clinic staff can mail the prescription to your pharmacy. Please tell us the name of the pharmacy.    You may pick the prescription up at the Pain Clinic (tell us the location) or during a clinic visit with your pain provider    Pain Clinic staff can deliver the prescription to the [facility] pharmacy in the clinic building. Please tell us the location.      Scheduling number: [phone].  Call this number to schedule or change appointments.    We believe regular attendance is key to your success in our program.    Any time you are unable to keep your appointment we ask that you call us at least 24 hours in advance to let us know. This will allow us to offer the appointment time to another patient.                Follow-ups after your visit        Who to contact     If you have questions or need follow up information about today's clinic visit or your schedule please contact [facility] directly at [phone].  Normal or non-critical lab and imaging results will be communicated to you by MyChart, letter or phone within 4 business days after the clinic has received the results. If you do not hear from us within 7 days, please contact the clinic through MyChart or phone. If you have a critical or abnormal lab result, we will notify you by phone as soon as possible.  Submit refill requests through [employer] or call your pharmacy and they will forward the refill request to us. Please allow 3 business days for your refill to be completed.          Additional Information About Your Visit        [employer] Information     [employer] gives you secure access to your electronic health record. If you see a primary care provider, you can also send messages to your care team and make appointments. If you have questions, please call your primary care clinic.  If you do not have a primary care provider, please call [phone] and they will assist you.        Care EveryWhere ID     This is your Care EveryWhere ID. This could be used by other organizations to access your [facility] medical records  [id number]        Your Vitals Were     Pulse BMI (Body Mass Index)                76 27.89 kg/m2           Blood Pressure from Last 3 Encounters:   09/18/17 121/80   09/13/17 114/71   08/01/17 128/74    Weight from Last 3 Encounters:   09/18/17 90.7 kg (200 lb)   09/13/17 89.1 kg (196 lb 6.4 oz)   08/01/17 79.4 kg (175 lb 1.6 oz)              Today, you had the following     No orders found for display       Primary Care Provider Office Phone # Fax #    Esteban Daly [phone] [address] [address]        Equal Access to Services     KATHRYN GUZMAN AH: Negra Nathan  wakevonda joseadaha, qaybta kaalmada dayami, carmela caleroaan ah. So [facility] [phone].    ATENCIÓN: Si alexis felipe, tiene a hicks disposición servicios gratuitos de asistencia lingüística. Chu al [phone].    We comply with applicable federal civil rights laws and [place] laws. We do not discriminate on the basis of race, color, national origin, age, disability sex, sexual orientation or gender identity.            Thank you!     Thank you for choosing [facility]  for your care. Our goal is always to provide you with excellent care. Hearing back from our patients is one way we can continue to improve our services. Please take a few minutes to complete the written survey that you may receive in the mail after your visit with us. Thank you!             Your Updated Medication List - Protect others around you: Learn how to safely use, store and throw away your medicines at www.disposemymeds.org.          This list is accurate as of: 9/18/17 11:42 AM.  Always use your most recent med list.                   Brand Name Dispense Instructions for use Diagnosis    albuterol 108 (90 BASE) MCG/ACT Inhaler    PROAIR HFA/PROVENTIL HFA/VENTOLIN HFA    1 Inhaler    Inhale 2 puffs into the lungs every 4 hours as needed for shortness of breath / dyspnea or wheezing    Aspiration pneumonitis (H)       * amphetamine-dextroamphetamine 20 MG per tablet    ADDERALL    60 tablet    Take 1 tablet (20 mg) by mouth 2 times daily    ADHD (attention deficit hyperactivity disorder), inattentive type       * amphetamine-dextroamphetamine 20 MG per tablet    ADDERALL    60 tablet    Take 1 tablet (20 mg) by mouth 2 times daily    ADHD (attention deficit hyperactivity disorder), inattentive type       * amphetamine-dextroamphetamine 20 MG per tablet   Start taking on:  10/2/2017    ADDERALL    60 tablet    Take 1 tablet (20 mg) by mouth 2 times daily    ADHD (attention deficit hyperactivity disorder),  "inattentive type       carvedilol 3.125 MG tablet    COREG    60 tablet    Take 1 tablet (3.125 mg) by mouth 2 times daily (with meals)    Cardiomyopathy, unspecified (H)       cyanocobalamin 1000 MCG/ML injection    VITAMIN B12    1 mL    Inject 1 mL (1,000 mcg) into the muscle every 30 days    Bariatric surgery status       * diazepam 5 MG tablet    VALIUM    30 tablet    Take 0.5 tablets (2.5 mg) by mouth daily as needed for anxiety or sleep    Chronic anxiety       * diazepam 5 MG tablet    VALIUM    10 tablet    Take 1 tablet (5 mg) by mouth every 12 hours as needed for anxiety or sleep    Anxiety       * [facility] PATIENT      Contact [facility] for patient specific medication information at [phone] on admission and discharge from the hospital.  Phones are answered 24 hours a day 7 days a week 365 days a year.  Providers - Choose \"CONTINUE HOME MED (no script)\" at discharge if patient treatment with home infusion will continue.    S/P bariatric surgery       * [facility] PATIENT      Contact [facility] for patient specific medication information at [phone] on admission and discharge from the hospital.  Phones are answered 24 hours a day 7 days a week 365 days a year.  Providers - Choose \"CONTINUE HOME MED (no script)\" at discharge if patient treatment with home infusion will continue.    Severe malnutrition (H)       fentaNYL 50 mcg/hr 72 hr patch    DURAGESIC    15 patch    Place 1 patch onto the skin every 48 hours MYLAN BRAND ONLY. Fill on/after 9/15/17 to start on/after 9/17/17    Chronic abdominal pain       lidocaine-prilocaine cream    EMLA    30 g    Apply topically as needed for moderate pain    Pain following surgery or procedure       morphine 0.1% in intrasite topical gel     100 g    Apply as needed prior to accessing the port site.    Pain following surgery or procedure       mupirocin 2 % ointment    BACTROBAN    30 g " "   Apply topically 3 times daily    Skin infection       naloxone nasal spray    NARCAN    0.2 mL    Spray 1 spray (4 mg) into one nostril alternating nostrils as needed for opioid reversal (every 2-3 minutes until assistance arrives.)    Encounter for long-term opiate analgesic use, Chronic abdominal pain       Needle (Disp) 27G X 1/2\" Misc    BD DISP NEEDLES    3 each    1 Device every 30 days Use for cyanocobalamin injection once q 30 days.    Bariatric surgery status       nystatin-triamcinolone cream    MYCOLOG II    60 g    Apply topically 2 times daily as needed    Skin irritation       ondansetron 8 MG ODT tab    ZOFRAN-ODT    90 tablet    Take 1 tablet (8 mg) by mouth every 8 hours as needed for nausea    Nausea       * order for DME     12 each    Injection Supplies for Vitamin B12: 3cc syringes w/ 27 gauge needles, 1/2 inch length    Bariatric surgery status       * order for DME     4 each    Equipment being ordered: Bilateral knee high chronic venous insufficiency stockings--  mild-moderate pressures.    Bilateral edema of lower extremity       oxyCODONE 5 MG/5ML solution    ROXICODONE    1650 mL    Take 10-15 mLs (10-15 mg) by mouth every 4 hours as needed for moderate to severe pain Max of 55 mg per day. Fill on/after 9/15/17 not to start till 9/17/17.    Encounter for long-term opiate analgesic use       senna-docusate 8.6-50 MG per tablet    SENOKOT-S;PERICOLACE    120 tablet    Take 1-2 tablets by mouth 2 times daily as needed for constipation    Slow transit constipation       sucralfate 1 GM/10ML suspension    CARAFATE    1200 mL    Take 10 mLs (1 g) by mouth 4 times daily    Nausea       vitamin D 80306 UNIT capsule    ERGOCALCIFEROL    12 capsule    Take 1 capsule (50,000 Units) by mouth every 7 days    Vitamin D deficiency       * Notice:  This list has 9 medication(s) that are the same as other medications prescribed for you. Read the directions carefully, and ask your doctor or other care " provider to review them with you.

## 2017-09-18 NOTE — TELEPHONE ENCOUNTER
Message from Raiing:  Original authorizing provider: SAILAJA Lam CNP, Sr would like a refill of the following medications:  mupirocin (BACTROBAN) 2 % ointment [SAILAJA Lam CNP]    Preferred pharmacy: [address]    Comment:      Medication renewals requested in this message routed to other providers:  cyanocobalamin (VITAMIN B12) 1000 MCG/ML injection [Esteban Daly MD]

## 2017-09-18 NOTE — TELEPHONE ENCOUNTER
mupirocin (BACTROBAN) 2 % ointment      Last Written Prescription Date: 1/524/2017  Last Fill Quantity: 30g,  # refills: 0   Last Office Visit with G, [facility] or [facility] prescribing provider: 8/1/2017                                         Next 5 appointments (look out 90 days)     Sep 18, 2017 11:00 AM CDT   Return Visit with Patti Milligan MD   [facility] [place] ([facility] [place])    [address]  [place] [id number] [phone]

## 2017-09-18 NOTE — TELEPHONE ENCOUNTER
cyanocobalamin (VITAMIN B12) 1000 MCG/ML injection  Was sent to the pharmacy on 8/2/2017 for 1 mL and 11 refills    Called the pharmacy to give a verbal - they already have this prescription on file.

## 2017-09-18 NOTE — NURSING NOTE
"Chief Complaint   Patient presents with     Pain       Initial /80  Pulse 76  Wt 90.7 kg (200 lb)  BMI 27.89 kg/m2 Estimated body mass index is 27.89 kg/(m^2) as calculated from the following:    Height as of 7/24/17: 1.803 m (5' 11\").    Weight as of this encounter: 90.7 kg (200 lb).  Medication Reconciliation: complete     Lyndsay Solo MA      "

## 2017-09-18 NOTE — PROGRESS NOTES
[facility]    Date of visit: 9/18/2017      Chief complaint:    Chief Complaint   Patient presents with     Pain       Interval history:  Parker Acevedo was last seen by me on 7/5/17    Recommendations/plan at the last visit included:  1. Continue oxycodone 15mg TID prn, putting at last 4 hours between doses.  He has had so many various issues coming up that weaning is not working well.  2. He is going to work on further weaning of the valium with Dr. Montalvo. Goal would be to come off.  3. He has naloxone prescription from a previous hospitalization.    4. UDS and opioid agreement  5. Follow up 2 months    Since his last visit, Parker Acevedo reports:  Abdominal pain and low back pain have been unchanged in severity and quality since we last saw him.  He denies radiation of low back pain and denies numbness/tingling.    Patient reports taking Oxycodone 10/10/10/15 mg (Total of 55 mg daily), Fentanyl 50 mcg/hr q 48 hours, Lidocaine patches with fair relief.  He has taken valium in the past but has only taken it once in the past month.   Patient states he had been having chest pain recently and is followed by [facility] and is currently wearing a Holter monitor.  He also admits to leg cramps for which he is being followed and receiving iron infusions.      Pain scores:  Pain intensity  5/10    Current pain treatments:   Oxycodone 10-15 mg liquid by G tube QID hours, total of 55 mg daily (MEDD 82.5 mg)  Fentanyl 50mcg/hr patch q48 hours   Lidocaine patches  Valium 5 mg/day, 1 tab in AM and PM- once in the past month  Naloxone- has from previous hospitalization.    Previous medication treatments included:   Tylenol #3   Vicodin   Tramadol   Diazepam- for sleep/anxiety  Gabapentin- bad headaches, acid reflux  Oxycodone max of 45mg/day.      Side Effects: no side effects    Medications:  Current Outpatient Prescriptions   Medication Sig Dispense Refill     diazepam  "(VALIUM) 5 MG tablet Take 1 tablet (5 mg) by mouth every 12 hours as needed for anxiety or sleep 10 tablet 0     oxyCODONE (ROXICODONE) 5 MG/5ML solution Take 10-15 mLs (10-15 mg) by mouth every 4 hours as needed for moderate to severe pain Max of 55 mg per day. Fill on/after 9/15/17 not to start till 9/17/17. 1650 mL 0     fentaNYL (DURAGESIC) 50 mcg/hr 72 hr patch Place 1 patch onto the skin every 48 hours MYLAN BRAND ONLY. Fill on/after 9/15/17 to start on/after 9/17/17 15 patch 0     naloxone (NARCAN) nasal spray Spray 1 spray (4 mg) into one nostril alternating nostrils as needed for opioid reversal (every 2-3 minutes until assistance arrives.) 0.2 mL 0     ondansetron (ZOFRAN-ODT) 8 MG ODT tab Take 1 tablet (8 mg) by mouth every 8 hours as needed for nausea 90 tablet 3     cyanocobalamin (VITAMIN B12) 1000 MCG/ML injection Inject 1 mL (1,000 mcg) into the muscle every 30 days 1 mL 11     lidocaine-prilocaine (EMLA) cream Apply topically as needed for moderate pain 30 g 11     [START ON 10/2/2017] amphetamine-dextroamphetamine (ADDERALL) 20 MG per tablet Take 1 tablet (20 mg) by mouth 2 times daily 60 tablet 0     amphetamine-dextroamphetamine (ADDERALL) 20 MG per tablet Take 1 tablet (20 mg) by mouth 2 times daily 60 tablet 0     amphetamine-dextroamphetamine (ADDERALL) 20 MG per tablet Take 1 tablet (20 mg) by mouth 2 times daily 60 tablet 0     diazepam (VALIUM) 5 MG tablet Take 0.5 tablets (2.5 mg) by mouth daily as needed for anxiety or sleep 30 tablet 0     carvedilol (COREG) 3.125 MG tablet Take 1 tablet (3.125 mg) by mouth 2 times daily (with meals) 60 tablet 3     Needle, Disp, (BD DISP NEEDLES) 27G X 1/2\" MISC 1 Device every 30 days Use for cyanocobalamin injection once q 30 days. 3 each 4     sucralfate (CARAFATE) 1 GM/10ML suspension Take 10 mLs (1 g) by mouth 4 times daily 1200 mL 11     morphine 0.1% in intrasite topical gel Apply as needed prior to accessing the port site. 100 g 0     vitamin D " "(ERGOCALCIFEROL) 82322 UNIT capsule Take 1 capsule (50,000 Units) by mouth every 7 days 12 capsule 3     albuterol (PROAIR HFA/PROVENTIL HFA/VENTOLIN HFA) 108 (90 BASE) MCG/ACT Inhaler Inhale 2 puffs into the lungs every 4 hours as needed for shortness of breath / dyspnea or wheezing 1 Inhaler 3     [place] [facility] PATIENT Contact [facility] for patient specific medication information at [phone] on admission and discharge from the hospital.  Phones are answered 24 hours a day 7 days a week 365 days a year.    Providers - Choose \"CONTINUE HOME MED (no script)\" at discharge if patient treatment with home infusion will continue.       mupirocin (BACTROBAN) 2 % ointment Apply topically 3 times daily 30 g 0     nystatin-triamcinolone (MYCOLOG II) cream Apply topically 2 times daily as needed 60 g 5     order for DME Equipment being ordered: Bilateral knee high chronic venous insufficiency stockings--  mild-moderate pressures. 4 each 5     [place] [facility] PATIENT Contact [facility] for patient specific medication information at [phone] on admission and discharge from the hospital.  Phones are answered 24 hours a day 7 days a week 365 days a year.    Providers - Choose \"CONTINUE HOME MED (no script)\" at discharge if patient treatment with home infusion will continue.       senna-docusate (SENOKOT-S;PERICOLACE) 8.6-50 MG per tablet Take 1-2 tablets by mouth 2 times daily as needed for constipation 120 tablet 11     order for DME Injection Supplies for Vitamin B12: 3cc syringes w/ 27 gauge needles, 1/2 inch length 12 each 0     [DISCONTINUED] NO ACTIVE MEDICATIONS . 0 0       Medical History: any changes in medical history since they were last seen? Chest pain and currently wearing Holter monitor.  Leg cramps and he is receiving infusions for it.     Review of Systems:  The 14 system ROS was reviewed from the intake questionnaire, and is positive for: " headache, nosebleeds, diarrhea, n/v, abdominal pain, anxiety, neck pain, back pain, urinary hesitancy  Any bowel or bladder problems: diarrhea, bladder normal  Mood: Baseline, with anxiety    Physical Exam:  Blood pressure 121/80, pulse 76, weight 90.7 kg (200 lb).  General: awake, alert,   Gait: Slow  MSK exam: hunched posture  Port site without drainage      THE 4 A's OF OPIOID MAINTENANCE ANALGESIA    Analgesia: good    Activity: on disability    Adverse effects: none    Adherence to Rx protocol: good- [place] Prescription Monitoring Program checked 9/18/17 appropriate      Assessment:   1. Chronic abdominal pain, with history of gastric bypass and later peptic ulcer   2. G tube placement- only used for venting  3. Myofascial abdominal pain, with significant guarding and poor posture  4. Opioid tolerance  5. Anxiety  6. Foot pain with tendonous injury- healed  7. Left arm weakness, consistent with radial nerve injury- improving  8. Port placement- h/o recurrent infections      Plan:   1. Patient has weaned benzo significantly, continue to wean  No changes to opioids at this time, but will further plan for decreases.  Discussed doses and risks with him and his wife, and CDC guidelines. Some if his oral meds may not be fully absorbed due to his medical issues.  3. He has naloxone prescription from a previous hospitalization.    4. Follow up 3 months    Total time spent was 25 minutes, and more than 50% of face to face time was spent in counseling and/or coordination of care regarding medications, opioid risks.   Jessica Milligan MD  [facility]

## 2017-09-19 ENCOUNTER — HOSPITAL ENCOUNTER (EMERGENCY)
Facility: CLINIC | Age: 47
Discharge: HOME | DRG: 270 | End: 2017-09-19
Attending: EMERGENCY MEDICINE | Admitting: EMERGENCY MEDICINE
Payer: COMMERCIAL

## 2017-09-19 ENCOUNTER — APPOINTMENT (OUTPATIENT)
Dept: GENERAL RADIOLOGY | Facility: CLINIC | Age: 47
DRG: 270 | End: 2017-09-19
Attending: EMERGENCY MEDICINE
Payer: COMMERCIAL

## 2017-09-19 ENCOUNTER — HOSPITAL ENCOUNTER (INPATIENT)
Facility: CLINIC | Age: 47
LOS: 4 days | Discharge: HOME HEALTH | DRG: 270 | End: 2017-09-23
Attending: EMERGENCY MEDICINE | Admitting: FAMILY MEDICINE
Payer: COMMERCIAL

## 2017-09-19 VITALS
SYSTOLIC BLOOD PRESSURE: 123 MMHG | OXYGEN SATURATION: 94 % | DIASTOLIC BLOOD PRESSURE: 77 MMHG | TEMPERATURE: 98.9 F | RESPIRATION RATE: 20 BRPM

## 2017-09-19 DIAGNOSIS — R78.81 BACTEREMIA: ICD-10-CM

## 2017-09-19 DIAGNOSIS — J18.9 PNEUMONIA OF LOWER LOBE DUE TO INFECTIOUS ORGANISM, UNSPECIFIED LATERALITY: ICD-10-CM

## 2017-09-19 LAB
ALBUMIN SERPL-MCNC: 3.2 G/DL (ref 3.4–5)
ALBUMIN UR-MCNC: NEGATIVE MG/DL
ALP SERPL-CCNC: 122 U/L (ref 40–150)
ALT SERPL W P-5'-P-CCNC: 220 U/L (ref 0–70)
ANION GAP SERPL CALCULATED.3IONS-SCNC: 5 MMOL/L (ref 3–14)
ANION GAP SERPL CALCULATED.3IONS-SCNC: 6 MMOL/L (ref 3–14)
APPEARANCE UR: CLEAR
AST SERPL W P-5'-P-CCNC: 222 U/L (ref 0–45)
BASE EXCESS BLDV CALC-SCNC: 4 MMOL/L
BASOPHILS # BLD AUTO: 0 10E9/L (ref 0–0.2)
BASOPHILS # BLD AUTO: 0 10E9/L (ref 0–0.2)
BASOPHILS NFR BLD AUTO: 0.2 %
BASOPHILS NFR BLD AUTO: 0.2 %
BILIRUB SERPL-MCNC: 0.4 MG/DL (ref 0.2–1.3)
BILIRUB UR QL STRIP: NEGATIVE
BUN SERPL-MCNC: 10 MG/DL (ref 7–30)
BUN SERPL-MCNC: 13 MG/DL (ref 7–30)
CALCIUM SERPL-MCNC: 7.9 MG/DL (ref 8.5–10.1)
CALCIUM SERPL-MCNC: 8.2 MG/DL (ref 8.5–10.1)
CHLORIDE SERPL-SCNC: 107 MMOL/L (ref 94–109)
CHLORIDE SERPL-SCNC: 107 MMOL/L (ref 94–109)
CO2 SERPL-SCNC: 27 MMOL/L (ref 20–32)
CO2 SERPL-SCNC: 28 MMOL/L (ref 20–32)
COLOR UR AUTO: YELLOW
CREAT SERPL-MCNC: 0.68 MG/DL (ref 0.66–1.25)
CREAT SERPL-MCNC: 0.72 MG/DL (ref 0.66–1.25)
DIFFERENTIAL METHOD BLD: ABNORMAL
DIFFERENTIAL METHOD BLD: ABNORMAL
EOSINOPHIL # BLD AUTO: 0 10E9/L (ref 0–0.7)
EOSINOPHIL # BLD AUTO: 0.2 10E9/L (ref 0–0.7)
EOSINOPHIL NFR BLD AUTO: 0.6 %
EOSINOPHIL NFR BLD AUTO: 1.8 %
ERYTHROCYTE [DISTWIDTH] IN BLOOD BY AUTOMATED COUNT: 16.5 % (ref 10–15)
ERYTHROCYTE [DISTWIDTH] IN BLOOD BY AUTOMATED COUNT: 16.6 % (ref 10–15)
GFR SERPL CREATININE-BSD FRML MDRD: >90 ML/MIN/1.7M2
GFR SERPL CREATININE-BSD FRML MDRD: >90 ML/MIN/1.7M2
GLUCOSE SERPL-MCNC: 91 MG/DL (ref 70–99)
GLUCOSE SERPL-MCNC: 98 MG/DL (ref 70–99)
GLUCOSE UR STRIP-MCNC: NEGATIVE MG/DL
HCO3 BLDV-SCNC: 28 MMOL/L (ref 21–28)
HCT VFR BLD AUTO: 37.9 % (ref 40–53)
HCT VFR BLD AUTO: 38.4 % (ref 40–53)
HGB BLD-MCNC: 12.2 G/DL (ref 13.3–17.7)
HGB BLD-MCNC: 12.3 G/DL (ref 13.3–17.7)
HGB UR QL STRIP: ABNORMAL
IMM GRANULOCYTES # BLD: 0 10E9/L (ref 0–0.4)
IMM GRANULOCYTES # BLD: 0 10E9/L (ref 0–0.4)
IMM GRANULOCYTES NFR BLD: 0.2 %
IMM GRANULOCYTES NFR BLD: 0.2 %
INR PPP: 1.13 (ref 0.86–1.14)
KETONES UR STRIP-MCNC: NEGATIVE MG/DL
LACTATE BLD-SCNC: 1.1 MMOL/L (ref 0.7–2)
LACTATE BLD-SCNC: 1.3 MMOL/L (ref 0.7–2)
LEUKOCYTE ESTERASE UR QL STRIP: NEGATIVE
LYMPHOCYTES # BLD AUTO: 0.7 10E9/L (ref 0.8–5.3)
LYMPHOCYTES # BLD AUTO: 1 10E9/L (ref 0.8–5.3)
LYMPHOCYTES NFR BLD AUTO: 11.6 %
LYMPHOCYTES NFR BLD AUTO: 13.8 %
MCH RBC QN AUTO: 29.3 PG (ref 26.5–33)
MCH RBC QN AUTO: 29.5 PG (ref 26.5–33)
MCHC RBC AUTO-ENTMCNC: 32 G/DL (ref 31.5–36.5)
MCHC RBC AUTO-ENTMCNC: 32.2 G/DL (ref 31.5–36.5)
MCV RBC AUTO: 91 FL (ref 78–100)
MCV RBC AUTO: 92 FL (ref 78–100)
MONOCYTES # BLD AUTO: 0.4 10E9/L (ref 0–1.3)
MONOCYTES # BLD AUTO: 0.9 10E9/L (ref 0–1.3)
MONOCYTES NFR BLD AUTO: 11 %
MONOCYTES NFR BLD AUTO: 8.6 %
MUCOUS THREADS #/AREA URNS LPF: PRESENT /LPF
NEUTROPHILS # BLD AUTO: 3.7 10E9/L (ref 1.6–8.3)
NEUTROPHILS # BLD AUTO: 6.4 10E9/L (ref 1.6–8.3)
NEUTROPHILS NFR BLD AUTO: 75.2 %
NEUTROPHILS NFR BLD AUTO: 76.6 %
NITRATE UR QL: NEGATIVE
NRBC # BLD AUTO: 0 10*3/UL
NRBC # BLD AUTO: 0 10*3/UL
NRBC BLD AUTO-RTO: 0 /100
NRBC BLD AUTO-RTO: 0 /100
O2/TOTAL GAS SETTING VFR VENT: 21 %
PCO2 BLDV: 39 MM HG (ref 40–50)
PH BLDV: 7.46 PH (ref 7.32–7.43)
PH UR STRIP: 5.5 PH (ref 5–7)
PLATELET # BLD AUTO: 112 10E9/L (ref 150–450)
PLATELET # BLD AUTO: 128 10E9/L (ref 150–450)
PO2 BLDV: 36 MM HG (ref 25–47)
POTASSIUM SERPL-SCNC: 3.5 MMOL/L (ref 3.4–5.3)
POTASSIUM SERPL-SCNC: 3.7 MMOL/L (ref 3.4–5.3)
PROCALCITONIN SERPL-MCNC: 0.17 NG/ML
PROT SERPL-MCNC: 6.9 G/DL (ref 6.8–8.8)
RBC # BLD AUTO: 4.13 10E12/L (ref 4.4–5.9)
RBC # BLD AUTO: 4.2 10E12/L (ref 4.4–5.9)
RBC #/AREA URNS AUTO: 2 /HPF (ref 0–2)
SODIUM SERPL-SCNC: 140 MMOL/L (ref 133–144)
SODIUM SERPL-SCNC: 140 MMOL/L (ref 133–144)
SOURCE: ABNORMAL
SP GR UR STRIP: 1.02 (ref 1–1.03)
TROPONIN I SERPL-MCNC: <0.015 UG/L (ref 0–0.04)
UROBILINOGEN UR STRIP-MCNC: 2 MG/DL (ref 0–2)
WBC # BLD AUTO: 4.8 10E9/L (ref 4–11)
WBC # BLD AUTO: 8.5 10E9/L (ref 4–11)
WBC #/AREA URNS AUTO: 2 /HPF (ref 0–2)

## 2017-09-19 PROCEDURE — 25000132 ZZH RX MED GY IP 250 OP 250 PS 637: Performed by: EMERGENCY MEDICINE

## 2017-09-19 PROCEDURE — 84484 ASSAY OF TROPONIN QUANT: CPT | Performed by: EMERGENCY MEDICINE

## 2017-09-19 PROCEDURE — 99285 EMERGENCY DEPT VISIT HI MDM: CPT | Mod: 25 | Performed by: EMERGENCY MEDICINE

## 2017-09-19 PROCEDURE — 25000128 H RX IP 250 OP 636: Performed by: EMERGENCY MEDICINE

## 2017-09-19 PROCEDURE — 80053 COMPREHEN METABOLIC PANEL: CPT | Performed by: EMERGENCY MEDICINE

## 2017-09-19 PROCEDURE — 99285 EMERGENCY DEPT VISIT HI MDM: CPT | Mod: Z6 | Performed by: EMERGENCY MEDICINE

## 2017-09-19 PROCEDURE — 85025 COMPLETE CBC W/AUTO DIFF WBC: CPT | Performed by: EMERGENCY MEDICINE

## 2017-09-19 PROCEDURE — 12000001 ZZH R&B MED SURG/OB UMMC

## 2017-09-19 PROCEDURE — 82803 BLOOD GASES ANY COMBINATION: CPT | Performed by: EMERGENCY MEDICINE

## 2017-09-19 PROCEDURE — 96365 THER/PROPH/DIAG IV INF INIT: CPT | Performed by: EMERGENCY MEDICINE

## 2017-09-19 PROCEDURE — 85610 PROTHROMBIN TIME: CPT | Performed by: EMERGENCY MEDICINE

## 2017-09-19 PROCEDURE — 96361 HYDRATE IV INFUSION ADD-ON: CPT | Performed by: EMERGENCY MEDICINE

## 2017-09-19 PROCEDURE — 87040 BLOOD CULTURE FOR BACTERIA: CPT | Performed by: EMERGENCY MEDICINE

## 2017-09-19 PROCEDURE — 86140 C-REACTIVE PROTEIN: CPT | Performed by: EMERGENCY MEDICINE

## 2017-09-19 PROCEDURE — 84145 PROCALCITONIN (PCT): CPT | Performed by: EMERGENCY MEDICINE

## 2017-09-19 PROCEDURE — 96375 TX/PRO/DX INJ NEW DRUG ADDON: CPT | Performed by: EMERGENCY MEDICINE

## 2017-09-19 PROCEDURE — 83605 ASSAY OF LACTIC ACID: CPT | Performed by: EMERGENCY MEDICINE

## 2017-09-19 PROCEDURE — 96366 THER/PROPH/DIAG IV INF ADDON: CPT | Performed by: EMERGENCY MEDICINE

## 2017-09-19 PROCEDURE — 87077 CULTURE AEROBIC IDENTIFY: CPT | Performed by: EMERGENCY MEDICINE

## 2017-09-19 RX ORDER — HEPARIN SODIUM,PORCINE 10 UNIT/ML
5-10 VIAL (ML) INTRAVENOUS EVERY 24 HOURS
Status: DISCONTINUED | OUTPATIENT
Start: 2017-09-19 | End: 2017-09-19

## 2017-09-19 RX ORDER — ALBUTEROL SULFATE 90 UG/1
2 AEROSOL, METERED RESPIRATORY (INHALATION) EVERY 4 HOURS PRN
Status: DISCONTINUED | OUTPATIENT
Start: 2017-09-19 | End: 2017-09-23 | Stop reason: HOSPADM

## 2017-09-19 RX ORDER — ONDANSETRON 4 MG/1
4 TABLET, ORALLY DISINTEGRATING ORAL EVERY 6 HOURS PRN
Status: DISCONTINUED | OUTPATIENT
Start: 2017-09-19 | End: 2017-09-23 | Stop reason: HOSPADM

## 2017-09-19 RX ORDER — CYANOCOBALAMIN 1000 UG/ML
1000 INJECTION, SOLUTION INTRAMUSCULAR; SUBCUTANEOUS
Status: DISCONTINUED | OUTPATIENT
Start: 2017-10-15 | End: 2017-09-21

## 2017-09-19 RX ORDER — ONDANSETRON 2 MG/ML
4 INJECTION INTRAMUSCULAR; INTRAVENOUS ONCE
Status: COMPLETED | OUTPATIENT
Start: 2017-09-19 | End: 2017-09-19

## 2017-09-19 RX ORDER — OXYCODONE HCL 5 MG/5 ML
15 SOLUTION, ORAL ORAL ONCE
Status: COMPLETED | OUTPATIENT
Start: 2017-09-19 | End: 2017-09-19

## 2017-09-19 RX ORDER — ACETAMINOPHEN 325 MG/1
975 TABLET ORAL ONCE
Status: DISCONTINUED | OUTPATIENT
Start: 2017-09-19 | End: 2017-09-19

## 2017-09-19 RX ORDER — DIPHENHYDRAMINE HYDROCHLORIDE 50 MG/ML
50 INJECTION INTRAMUSCULAR; INTRAVENOUS ONCE
Status: COMPLETED | OUTPATIENT
Start: 2017-09-19 | End: 2017-09-19

## 2017-09-19 RX ORDER — ONDANSETRON 2 MG/ML
4 INJECTION INTRAMUSCULAR; INTRAVENOUS EVERY 6 HOURS PRN
Status: DISCONTINUED | OUTPATIENT
Start: 2017-09-19 | End: 2017-09-23 | Stop reason: HOSPADM

## 2017-09-19 RX ORDER — FENTANYL 50 UG/1
50 PATCH TRANSDERMAL
Status: DISCONTINUED | OUTPATIENT
Start: 2017-09-20 | End: 2017-09-23 | Stop reason: HOSPADM

## 2017-09-19 RX ORDER — CARVEDILOL 3.12 MG/1
6.25 TABLET ORAL 2 TIMES DAILY WITH MEALS
Status: DISCONTINUED | OUTPATIENT
Start: 2017-09-19 | End: 2017-09-23 | Stop reason: HOSPADM

## 2017-09-19 RX ORDER — ACETAMINOPHEN 325 MG/1
650 TABLET ORAL EVERY 4 HOURS PRN
Status: DISCONTINUED | OUTPATIENT
Start: 2017-09-19 | End: 2017-09-21

## 2017-09-19 RX ORDER — ERGOCALCIFEROL 1.25 MG/1
50000 CAPSULE, LIQUID FILLED ORAL
Status: DISCONTINUED | OUTPATIENT
Start: 2017-09-24 | End: 2017-09-23 | Stop reason: HOSPADM

## 2017-09-19 RX ORDER — AMOXICILLIN 250 MG
1-2 CAPSULE ORAL 2 TIMES DAILY PRN
Status: DISCONTINUED | OUTPATIENT
Start: 2017-09-19 | End: 2017-09-23 | Stop reason: HOSPADM

## 2017-09-19 RX ORDER — SODIUM CHLORIDE 9 MG/ML
INJECTION, SOLUTION INTRAVENOUS CONTINUOUS
Status: DISCONTINUED | OUTPATIENT
Start: 2017-09-19 | End: 2017-09-23 | Stop reason: HOSPADM

## 2017-09-19 RX ORDER — AZITHROMYCIN 250 MG/1
TABLET, FILM COATED ORAL
Qty: 6 TABLET | Refills: 0 | Status: ON HOLD | OUTPATIENT
Start: 2017-09-19 | End: 2017-09-20

## 2017-09-19 RX ORDER — NALOXONE HYDROCHLORIDE 0.4 MG/ML
.1-.4 INJECTION, SOLUTION INTRAMUSCULAR; INTRAVENOUS; SUBCUTANEOUS
Status: DISCONTINUED | OUTPATIENT
Start: 2017-09-19 | End: 2017-09-23 | Stop reason: HOSPADM

## 2017-09-19 RX ORDER — DIPHENHYDRAMINE HYDROCHLORIDE 50 MG/ML
50 INJECTION INTRAMUSCULAR; INTRAVENOUS EVERY 12 HOURS PRN
Status: DISCONTINUED | OUTPATIENT
Start: 2017-09-19 | End: 2017-09-21

## 2017-09-19 RX ORDER — SUCRALFATE ORAL 1 G/10ML
1 SUSPENSION ORAL
Status: DISCONTINUED | OUTPATIENT
Start: 2017-09-19 | End: 2017-09-23 | Stop reason: HOSPADM

## 2017-09-19 RX ORDER — LIDOCAINE 40 MG/G
CREAM TOPICAL
Status: DISCONTINUED | OUTPATIENT
Start: 2017-09-19 | End: 2017-09-19

## 2017-09-19 RX ORDER — OXYCODONE HCL 5 MG/5 ML
10-15 SOLUTION, ORAL ORAL EVERY 4 HOURS PRN
Status: DISCONTINUED | OUTPATIENT
Start: 2017-09-19 | End: 2017-09-23 | Stop reason: HOSPADM

## 2017-09-19 RX ORDER — LIDOCAINE 40 MG/G
CREAM TOPICAL
Status: DISCONTINUED | OUTPATIENT
Start: 2017-09-19 | End: 2017-09-23 | Stop reason: HOSPADM

## 2017-09-19 RX ORDER — DIPHENHYDRAMINE HYDROCHLORIDE 50 MG/ML
50 INJECTION INTRAMUSCULAR; INTRAVENOUS EVERY 12 HOURS
Status: DISCONTINUED | OUTPATIENT
Start: 2017-09-20 | End: 2017-09-20

## 2017-09-19 RX ORDER — LIDOCAINE 40 MG/G
CREAM TOPICAL
Status: DISCONTINUED | OUTPATIENT
Start: 2017-09-19 | End: 2017-09-19 | Stop reason: HOSPADM

## 2017-09-19 RX ORDER — HEPARIN SODIUM,PORCINE 10 UNIT/ML
5-10 VIAL (ML) INTRAVENOUS
Status: DISCONTINUED | OUTPATIENT
Start: 2017-09-19 | End: 2017-09-19

## 2017-09-19 RX ADMIN — DIPHENHYDRAMINE HYDROCHLORIDE 50 MG: 50 INJECTION, SOLUTION INTRAMUSCULAR; INTRAVENOUS at 20:48

## 2017-09-19 RX ADMIN — VANCOMYCIN HYDROCHLORIDE 1750 MG: 10 INJECTION, POWDER, LYOPHILIZED, FOR SOLUTION INTRAVENOUS at 20:48

## 2017-09-19 RX ADMIN — OXYCODONE HYDROCHLORIDE 15 MG: 5 SOLUTION ORAL at 02:20

## 2017-09-19 RX ADMIN — ACETAMINOPHEN 975 MG: 325 SOLUTION ORAL at 21:46

## 2017-09-19 RX ADMIN — ONDANSETRON 4 MG: 2 INJECTION INTRAMUSCULAR; INTRAVENOUS at 02:20

## 2017-09-19 RX ADMIN — OXYCODONE HYDROCHLORIDE 15 MG: 5 SOLUTION ORAL at 20:49

## 2017-09-19 RX ADMIN — SODIUM CHLORIDE 500 ML: 9 INJECTION, SOLUTION INTRAVENOUS at 20:09

## 2017-09-19 ASSESSMENT — ENCOUNTER SYMPTOMS
COLOR CHANGE: 0
WOUND: 0
VOMITING: 0
RHINORRHEA: 0
MYALGIAS: 1
ARTHRALGIAS: 1
DIARRHEA: 1
FATIGUE: 1
BACK PAIN: 1
ABDOMINAL PAIN: 0
DIAPHORESIS: 1
NAUSEA: 0
VOMITING: 0
SHORTNESS OF BREATH: 0
BACK PAIN: 1
SORE THROAT: 0
HEADACHES: 1
WEAKNESS: 0
ABDOMINAL PAIN: 0
COUGH: 0
ARTHRALGIAS: 1
CHILLS: 1
FATIGUE: 1
FEVER: 1
COUGH: 1
FEVER: 1

## 2017-09-19 NOTE — ED AVS SNAPSHOT
[facility], Emergency Department    [address]    [place] [address]    Phone:  [phone]                                       Parker Acevedo Sr   MRN: [id number]    Department:  [facility], Emergency Department   Date of Visit:  9/18/2017           Patient Information     Date Of Birth          1970        Your diagnoses for this visit were:     Pneumonia of lower lobe due to infectious organism, unspecified laterality (H)        You were seen by Hubert Skinner MD.      Follow-up Information     Follow up with Esteban Daly MD.    Specialty:  Family Practice    Contact information:    [address] [place] [address] [phone]          Discharge Instructions       Take antibiotic as directed for possible pneumonia.  Follow up this week with your primary clinic provider.  Return if persistent symptoms.  Follow up on blood cultures collected today.      Future Appointments        Provider Department Dept Phone Center    1/3/2018 2:00 PM Patti Milligan MD [facility] [phone]  PAIN [facility]      24 Hour Appointment Hotline       To make an appointment at any [facility], call [phone] ([phone]). If you don't have a family doctor or clinic, we will help you find one. [facility] are conveniently located to serve the needs of you and your family.             Review of your medicines      START taking        Dose / Directions Last dose taken    azithromycin 250 MG tablet   Commonly known as:  ZITHROMAX Z-ISABELLA   Quantity:  6 tablet        Two tablets on the first day, then one tablet daily for the next 4 days   Refills:  0          Our records show that you are taking the medicines listed below. If these are incorrect, please call your family doctor or clinic.        Dose / Directions Last dose taken    albuterol 108 (90 BASE) MCG/ACT Inhaler   Commonly known as:  PROAIR HFA/PROVENTIL HFA/VENTOLIN HFA   Dose:  2 puff   Quantity:  1  "Inhaler        Inhale 2 puffs into the lungs every 4 hours as needed for shortness of breath / dyspnea or wheezing   Refills:  3        * amphetamine-dextroamphetamine 20 MG per tablet   Commonly known as:  ADDERALL   Dose:  20 mg   Quantity:  60 tablet        Take 1 tablet (20 mg) by mouth 2 times daily   Refills:  0        * amphetamine-dextroamphetamine 20 MG per tablet   Commonly known as:  ADDERALL   Dose:  20 mg   Quantity:  60 tablet        Take 1 tablet (20 mg) by mouth 2 times daily   Refills:  0        * amphetamine-dextroamphetamine 20 MG per tablet   Commonly known as:  ADDERALL   Dose:  20 mg   Quantity:  60 tablet   Start taking on:  10/2/2017        Take 1 tablet (20 mg) by mouth 2 times daily   Refills:  0        carvedilol 3.125 MG tablet   Commonly known as:  COREG   Dose:  3.125 mg   Quantity:  60 tablet        Take 1 tablet (3.125 mg) by mouth 2 times daily (with meals)   Refills:  3        cyanocobalamin 1000 MCG/ML injection   Commonly known as:  VITAMIN B12   Dose:  1 mL   Quantity:  1 mL        Inject 1 mL (1,000 mcg) into the muscle every 30 days   Refills:  11        * diazepam 5 MG tablet   Commonly known as:  VALIUM   Dose:  2.5 mg   Quantity:  30 tablet        Take 0.5 tablets (2.5 mg) by mouth daily as needed for anxiety or sleep   Refills:  0        * diazepam 5 MG tablet   Commonly known as:  VALIUM   Dose:  5 mg   Quantity:  10 tablet        Take 1 tablet (5 mg) by mouth every 12 hours as needed for anxiety or sleep   Refills:  0        * [facility]NAGED PATIENT        Contact [facility] for patient specific medication information at [phone] on admission and discharge from the hospital.  Phones are answered 24 hours a day 7 days a week 365 days a year.  Providers - Choose \"CONTINUE HOME MED (no script)\" at discharge if patient treatment with home infusion will continue.   Refills:  0        * [facility] MANAGED PATIENT        Contact " "[facility] for patient specific medication information at [phone] on admission and discharge from the hospital.  Phones are answered 24 hours a day 7 days a week 365 days a year.  Providers - Choose \"CONTINUE HOME MED (no script)\" at discharge if patient treatment with home infusion will continue.   Refills:  0        fentaNYL 50 mcg/hr 72 hr patch   Commonly known as:  DURAGESIC   Dose:  1 patch   Quantity:  15 patch        Place 1 patch onto the skin every 48 hours MYLAN BRAND ONLY. Fill on/after 9/15/17 to start on/after 9/17/17   Refills:  0        lidocaine-prilocaine cream   Commonly known as:  EMLA   Quantity:  30 g        Apply topically as needed for moderate pain   Refills:  11        morphine 0.1% in intrasite topical gel   Quantity:  100 g        Apply as needed prior to accessing the port site.   Refills:  0        mupirocin 2 % ointment   Commonly known as:  BACTROBAN   Quantity:  30 g        Apply topically 3 times daily   Refills:  3        naloxone nasal spray   Commonly known as:  NARCAN   Dose:  4 mg   Quantity:  0.2 mL        Spray 1 spray (4 mg) into one nostril alternating nostrils as needed for opioid reversal (every 2-3 minutes until assistance arrives.)   Refills:  0        Needle (Disp) 27G X 1/2\" Misc   Commonly known as:  BD DISP NEEDLES   Dose:  1 Device   Quantity:  3 each        1 Device every 30 days Use for cyanocobalamin injection once q 30 days.   Refills:  4        nystatin-triamcinolone cream   Commonly known as:  MYCOLOG II   Quantity:  60 g        Apply topically 2 times daily as needed   Refills:  5        ondansetron 8 MG ODT tab   Commonly known as:  ZOFRAN-ODT   Dose:  8 mg   Quantity:  90 tablet        Take 1 tablet (8 mg) by mouth every 8 hours as needed for nausea   Refills:  3        * order for DME   Quantity:  12 each        Injection Supplies for Vitamin B12: 3cc syringes w/ 27 gauge needles, 1/2 inch length   Refills:  0        * order for DME "   Quantity:  4 each        Equipment being ordered: Bilateral knee high chronic venous insufficiency stockings--  mild-moderate pressures.   Refills:  5        oxyCODONE 5 MG/5ML solution   Commonly known as:  ROXICODONE   Dose:  10-15 mg   Quantity:  1650 mL        Take 10-15 mLs (10-15 mg) by mouth every 4 hours as needed for moderate to severe pain Max of 55 mg per day. Fill on/after 9/15/17 not to start till 9/17/17.   Refills:  0        senna-docusate 8.6-50 MG per tablet   Commonly known as:  SENOKOT-S;PERICOLACE   Dose:  1-2 tablet   Quantity:  120 tablet        Take 1-2 tablets by mouth 2 times daily as needed for constipation   Refills:  11        sucralfate 1 GM/10ML suspension   Commonly known as:  CARAFATE   Dose:  1 g   Quantity:  1200 mL        Take 10 mLs (1 g) by mouth 4 times daily   Refills:  11        vitamin D 03778 UNIT capsule   Commonly known as:  ERGOCALCIFEROL   Dose:  59675 Units   Quantity:  12 capsule        Take 1 capsule (50,000 Units) by mouth every 7 days   Refills:  3        * Notice:  This list has 9 medication(s) that are the same as other medications prescribed for you. Read the directions carefully, and ask your doctor or other care provider to review them with you.            Prescriptions were sent or printed at these locations (1 Prescription)                   Other Prescriptions                Printed at Department/Unit printer (1 of 1)         azithromycin (ZITHROMAX Z-ISABELLA) 250 MG tablet                Procedures and tests performed during your visit     Procedure/Test Number of Times Performed    Basic metabolic panel 1    Blood culture 2    CBC with platelets differential 1    Lactic acid 1    Peripheral IV catheter 1    UA with Microscopic reflex to Culture 1    XR Chest 2 Views 1      Orders Needing Specimen Collection     None      Pending Results     Date and Time Order Name Status Description    9/19/2017 0049 XR Chest 2 Views Preliminary     9/19/2017 0046 Blood  culture Preliminary     9/19/2017 0046 Blood culture Preliminary             Pending Culture Results     Date and Time Order Name Status Description    9/19/2017 0046 Blood culture Preliminary     9/19/2017 0046 Blood culture Preliminary             Pending Results Instructions     If you had any lab results that were not finalized at the time of your Discharge, you can call the ED Lab Result RN at [phone]. You will be contacted by this team for any positive Lab results or changes in treatment. The nurses are available 7 days a week from 10A to 6:30P.  You can leave a message 24 hours per day and they will return your call.        Thank you for choosing [facility]       Thank you for choosing [facility] for your care. Our goal is always to provide you with excellent care. Hearing back from our patients is one way we can continue to improve our services. Please take a few minutes to complete the written survey that you may receive in the mail after you visit with us. Thank you!        [employer] Information     [employer] gives you secure access to your electronic health record. If you see a primary care provider, you can also send messages to your care team and make appointments. If you have questions, please call your primary care clinic.  If you do not have a primary care provider, please call [phone] and they will assist you.        Care EveryWhere ID     This is your Care EveryWhere ID. This could be used by other organizations to access your [facility] medical records  [id number]        Equal Access to Services     KATHRYN GUZMAN : Negra reyes Soheike, waaxda luqadaha, qaybta kaalmada carmela stock. So [facility] [phone].    ATENCIÓN: Si habla español, tiene a hicks disposición servicios gratuitos de asistencia lingüística. Llame al [phone].    We comply with applicable federal civil rights laws and [place] laws. We do not discriminate on the basis of race,  color, national origin, age, disability sex, sexual orientation or gender identity.            After Visit Summary       This is your record. Keep this with you and show to your community pharmacist(s) and doctor(s) at your next visit.

## 2017-09-19 NOTE — IP AVS SNAPSHOT
[address] [address]    Phone:  [phone]                                       After Visit Summary   9/19/2017    Parker Acevedo Sr    MRN: [id number]           After Visit Summary Signature Page     I have received my discharge instructions, and my questions have been answered. I have discussed any challenges I see with this plan with the nurse or doctor.    ..........................................................................................................................................  Patient/Patient Representative Signature      ..........................................................................................................................................  Patient Representative Print Name and Relationship to Patient    ..................................................               ................................................  Date                                            Time    ..........................................................................................................................................  Reviewed by Signature/Title    ...................................................              ..............................................  Date                                                            Time

## 2017-09-19 NOTE — ED NOTES
Parker was seen here last night and he tells me that his PowerPort is infected. He had a blood culture come back positive today and was called by a physician and told to come to the ER. This afternoon he had a fever of 103 and did take tylenol and now has a temp of 99.5.

## 2017-09-19 NOTE — DISCHARGE INSTRUCTIONS
Take antibiotic as directed for possible pneumonia.  Follow up this week with your primary clinic provider.  Return if persistent symptoms.  Follow up on blood cultures collected today.

## 2017-09-19 NOTE — IP AVS SNAPSHOT
MRN:[id number]                      After Visit Summary   9/19/2017    Parker Acevedo     MRN: [id number]           Thank you!     Thank you for choosing [facility] for your care. Our goal is always to provide you with excellent care. Hearing back from our patients is one way we can continue to improve our services. Please take a few minutes to complete the written survey that you may receive in the mail after you visit with us. Thank you!        Patient Information     Date Of Birth          1970        Designated Caregiver       Most Recent Value    Caregiver    Will someone help with your care after discharge? yes    Name of designated caregiver Rose [Rose]    Phone number of caregiver [phone]    Caregiver address [id number] [place] [address]      About your hospital stay     You were admitted on:  September 19, 2017 You last received care in the:  [address] [facility]    You were discharged on:  September 23, 2017        Reason for your hospital stay       You were admitted for treatment of blood stream infection.                  Who to Call     For medical emergencies, please call 911.  For non-urgent questions about your medical care, please call your primary care provider or clinic, [phone]          Attending Provider     Provider Specialty    Guy Guerin MD Emergency Medicine    Kailey, Antoine Hardwick MD Family Practice       Primary Care Provider Office Phone # Fax #    Esteban Daly [phone]      After Care Instructions     Activity       Your activity upon discharge: activity as tolerated            Diet       Follow this diet upon discharge: Orders Placed This Encounter      Room Service      Regular Diet Adult                  Follow-up Appointments     [facility] Follow-up and recommended labs and tests       Follow up with primary care provider, Esteban Daly, on Monday.  The following labs/tests are  recommended: Vancomycin drug level, CBC, CRP, CMP.      Appointments on [place] and/or [facility] (with [facility] or [facility] provider or service). Call [phone] if you haven't heard regarding these appointments within 7 days of discharge.                  Your next 10 appointments already scheduled     Sep 25, 2017 11:40 AM CDT   Office Visit with SAILAJA Del Cid CNP   [facility]    [address], [address]  [facility] [address] [phone]           Bring a current list of meds and any records pertaining to this visit. For Physicals, please bring immunization records and any forms needing to be filled out. Please arrive 10 minutes early to complete paperwork.            Jan 03, 2018  2:00 PM CST   Return Visit with Patti Milligan MD   [facility] ([facility])    [address] [address] [phone]              Additional Services     Home infusion referral       Your provider has referred you to  [facility]  Phone  [phone]  Fax  [phone]    Home IV infusion with  Daily blood cultures through weekend  IV Vancomycin       Anticipated Length of Therapy: Per MD orders    Home Infusion Pharmacist to adjust therapy based on labs and clinical assessments: Yes    Labs:  May draw labs from Venous Catheter: Yes  Home Infusion Pharmacist to order labs based on therapy type and clinical assessments: Yes  Call/Fax Lab Results to: Esteban Daly MD  [phone]    Agency Staff to assess nursing needs for Infusion Therapy.      IV Access Type: Mid Line  Flush with Heparin and Normal Saline IVP PRN and routine site care (per agency protocol) to maintain access device? Yes            Medication Therapy Management Referral       Reason for referral:  on more than 10 medications and hospitalized or in the ED in the past 6 months     This service is designed to help you get  the most from your medications.  A specially trained pharmacist will work closely with you and your doctors  to solve any problems related to your medications and to help you get the   best results from taking them.      The Medication Therapy Management staff will call you to schedule an appointment.                  Further instructions from your care team       You will be receiving a call from the Infectious Disease clinic to confirm the time and date of your follow up appointment. This appointment should be on Tuesday 9/26. If you do not hear from them within 2 business days, please call them at [phone].      Pending Results     Date and Time Order Name Status Description    9/23/2017 0000 Blood culture Preliminary     9/23/2017 0000 Blood culture Preliminary     9/22/2017 1128 Catheter Tip Culture Aerobic Bacterial Preliminary     9/22/2017 0000 Blood culture Preliminary     9/22/2017 0000 Blood culture Preliminary     9/21/2017 2009 Blood culture Preliminary     9/21/2017 2009 Blood culture Preliminary     9/21/2017 1032 Blood culture Preliminary     9/21/2017 0000 Blood culture Preliminary     9/21/2017 0000 Blood culture Preliminary     9/20/2017 0001 Blood culture Preliminary     9/20/2017 0001 Blood culture Preliminary     9/19/2017 1925 Blood culture Preliminary     9/19/2017 1925 Blood culture Preliminary     9/19/2017 0046 Blood culture Preliminary     9/19/2017 0046 Blood culture Preliminary             Statement of Approval     Ordered          09/23/17 1518  I have reviewed and agree with all the recommendations and orders detailed in this document.  EFFECTIVE NOW     Approved and electronically signed by:  Theresa Pink MD             Admission Information     Date & Time Provider Department Dept. Phone    9/19/2017 Antoine Bonner MD [address] [facility] [phone]      Your Vitals Were     Blood Pressure Pulse Temperature Respirations Height Weight    120/74 (BP Location: Left arm)  "85 98.6  F (37  C) (Oral) 16 1.803 m (5' 11\") 92.5 kg (204 lb)    Pulse Oximetry BMI (Body Mass Index)                98% 28.45 kg/m2          [employer] Information     [employer] gives you secure access to your electronic health record. If you see a primary care provider, you can also send messages to your care team and make appointments. If you have questions, please call your primary care clinic.  If you do not have a primary care provider, please call [phone] and they will assist you.        Care EveryWhere ID     This is your Care EveryWhere ID. This could be used by other organizations to access your [facility] medical records  [id number]        Equal Access to Services     KATHRYN GUZMAN : Negra Nathan, haydee cr, myrtle stock, carmela rizvi. [facility] [phone].    ATENCIÓN: Si habla español, tiene a hicks disposición servicios gratuitos de asistencia lingüística. Llame al [phone].    We comply with applicable federal civil rights laws and [place] laws. We do not discriminate on the basis of race, color, national origin, age, disability sex, sexual orientation or gender identity.               Review of your medicines      START taking        Dose / Directions    vancomycin in 0.9% NaCl 1750 mg IVPB   Commonly known as:  VANCOCIN   Used for:  Bacteremia        Dose:  1750 mg   Inject 500 mLs (1,750 mg) into the vein every 8 hours for 14 days   Quantity:  81140 mL   Refills:  0         CONTINUE these medicines which may have CHANGED, or have new prescriptions. If we are uncertain of the size of tablets/capsules you have at home, strength may be listed as something that might have changed.        Dose / Directions    lidocaine-prilocaine cream   Commonly known as:  EMLA   This may have changed:    - how much to take  - reasons to take this  - additional instructions   Used for:  Pain following surgery or procedure        Apply topically as " needed for moderate pain   Quantity:  30 g   Refills:  11       mupirocin 2 % ointment   Commonly known as:  BACTROBAN   This may have changed:    - when to take this  - reasons to take this   Used for:  Skin infection        Apply topically 3 times daily   Quantity:  30 g   Refills:  3         CONTINUE these medicines which have NOT CHANGED        Dose / Directions    albuterol 108 (90 BASE) MCG/ACT Inhaler   Commonly known as:  PROAIR HFA/PROVENTIL HFA/VENTOLIN HFA   Used for:  Aspiration pneumonitis (H)        Dose:  2 puff   Inhale 2 puffs into the lungs every 4 hours as needed for shortness of breath / dyspnea or wheezing   Quantity:  1 Inhaler   Refills:  3       * amphetamine-dextroamphetamine 20 MG per tablet   Commonly known as:  ADDERALL   Used for:  ADHD (attention deficit hyperactivity disorder), inattentive type        Dose:  20 mg   Take 1 tablet (20 mg) by mouth 2 times daily   Quantity:  60 tablet   Refills:  0       * amphetamine-dextroamphetamine 20 MG per tablet   Commonly known as:  ADDERALL   Used for:  ADHD (attention deficit hyperactivity disorder), inattentive type        Dose:  20 mg   Take 1 tablet (20 mg) by mouth 2 times daily   Quantity:  60 tablet   Refills:  0       * amphetamine-dextroamphetamine 20 MG per tablet   Commonly known as:  ADDERALL   Used for:  ADHD (attention deficit hyperactivity disorder), inattentive type        Dose:  20 mg   Start taking on:  10/2/2017   Take 1 tablet (20 mg) by mouth 2 times daily   Quantity:  60 tablet   Refills:  0       COREG PO        Dose:  6.25 mg   Take 6.25 mg by mouth 2 times daily   Refills:  0       * cyanocobalamin 1000 MCG/ML injection   Commonly known as:  VITAMIN B12        Dose:  1 mL   Inject 1 mL into the muscle every 30 days   Refills:  0       * cyanocobalamin 1000 MCG/ML injection   Commonly known as:  VITAMIN B12   Used for:  Bariatric surgery status        Dose:  1 mL   Inject 1 mL (1,000 mcg) into the muscle every 30 days  "  Quantity:  1 mL   Refills:  11       * [place] [facility]NA[facility]ED PATIENT   Used for:  S/P bariatric surgery        Contact [facility] for patient specific medication information at [phone] on admission and discharge from the hospital.  Phones are answered 24 hours a day 7 days a week 365 days a year.  Providers - Choose \"CONTINUE HOME MED (no script)\" at discharge if patient treatment with home infusion will continue.   Refills:  0       * [place] [facility]NA[facility]ED PATIENT   Used for:  Severe malnutrition (H)        Contact [facility] for patient specific medication information at [phone] on admission and discharge from the hospital.  Phones are answered 24 hours a day 7 days a week 365 days a year.  Providers - Choose \"CONTINUE HOME MED (no script)\" at discharge if patient treatment with home infusion will continue.   Refills:  0       fentaNYL 50 mcg/hr 72 hr patch   Commonly known as:  DURAGESIC   Used for:  Chronic abdominal pain        Dose:  1 patch   Place 1 patch onto the skin every 48 hours MYLAN BRAND ONLY. Fill on/after 9/15/17 to start on/after 9/17/17   Quantity:  15 patch   Refills:  0       morphine 0.1% in intrasite topical gel   Used for:  Pain following surgery or procedure        Apply as needed prior to accessing the port site.   Quantity:  100 g   Refills:  0       naloxone nasal spray   Commonly known as:  NARCAN   Used for:  Encounter for long-term opiate analgesic use, Chronic abdominal pain        Dose:  4 mg   Spray 1 spray (4 mg) into one nostril alternating nostrils as needed for opioid reversal (every 2-3 minutes until assistance arrives.)   Quantity:  0.2 mL   Refills:  0       Needle (Disp) 27G X 1/2\" Misc   Commonly known as:  BD DISP NEEDLES   Used for:  Bariatric surgery status        Dose:  1 Device   1 Device every 30 days Use for cyanocobalamin injection once q 30 days.   Quantity:  3 each   Refills:  4       " nystatin-triamcinolone cream   Commonly known as:  MYCOLOG II   Used for:  Skin irritation        Apply topically 2 times daily as needed   Quantity:  60 g   Refills:  5       ondansetron 8 MG ODT tab   Commonly known as:  ZOFRAN-ODT   Used for:  Nausea        Dose:  8 mg   Take 1 tablet (8 mg) by mouth every 8 hours as needed for nausea   Quantity:  90 tablet   Refills:  3       * order for DME   Used for:  Bariatric surgery status        Injection Supplies for Vitamin B12: 3cc syringes w/ 27 gauge needles, 1/2 inch length   Quantity:  12 each   Refills:  0       * order for DME   Used for:  Bilateral edema of lower extremity        Equipment being ordered: Bilateral knee high chronic venous insufficiency stockings--  mild-moderate pressures.   Quantity:  4 each   Refills:  5       oxyCODONE 5 MG/5ML solution   Commonly known as:  ROXICODONE   Used for:  Encounter for long-term opiate analgesic use        Dose:  10-15 mg   Take 10-15 mLs (10-15 mg) by mouth every 4 hours as needed for moderate to severe pain Max of 55 mg per day. Fill on/after 9/15/17 not to start till 9/17/17.   Quantity:  1650 mL   Refills:  0       sucralfate 1 GM/10ML suspension   Commonly known as:  CARAFATE   Used for:  Nausea        Dose:  1 g   Take 10 mLs (1 g) by mouth 4 times daily   Quantity:  1200 mL   Refills:  11       vitamin D 76986 UNIT capsule   Commonly known as:  ERGOCALCIFEROL   Used for:  Vitamin D deficiency        Dose:  30976 Units   Take 1 capsule (50,000 Units) by mouth every 7 days   Quantity:  12 capsule   Refills:  3       * Notice:  This list has 9 medication(s) that are the same as other medications prescribed for you. Read the directions carefully, and ask your doctor or other care provider to review them with you.         Where to get your medicines      Some of these will need a paper prescription and others can be bought over the counter. Ask your nurse if you have questions.     Bring a paper prescription for  each of these medications     vancomycin in 0.9% NaCl 1750 mg IVPB               ANTIBIOTIC INSTRUCTION     You've Been Prescribed an Antibiotic - Now What?  Your healthcare team thinks that you or your loved one might have an infection. Some infections can be treated with antibiotics, which are powerful, life-saving drugs. Like all medications, antibiotics have side effects and should only be used when necessary. There are some important things you should know about your antibiotic treatment.      Your healthcare team may run tests before you start taking an antibiotic.    Your team may take samples (e.g., from your blood, urine or other areas) to run tests to look for bacteria. These test can be important to determine if you need an antibiotic at all and, if you do, which antibiotic will work best.      Within a few days, your healthcare team might change or even stop your antibiotic.    Your team may start you on an antibiotic while they are working to find out what is making you sick.    Your team might change your antibiotic because test results show that a different antibiotic would be better to treat your infection.    In some cases, once your team has more information, they learn that you do not need an antibiotic at all. They may find out that you don't have an infection, or that the antibiotic you're taking won't work against your infection. For example, an infection caused by a virus can't be treated with antibiotics. Staying on an antibiotic when you don't need it is more likely to be harmful than helpful.      You may experience side effects from your antibiotic.    Like all medications, antibiotics have side effects. Some of these can be serious.    Let you healthcare team know if you have any known allergies when you are admitted to the hospital.    One significant side effect of nearly all antibiotics is the risk of severe and sometimes deadly diarrhea caused by Clostridium difficile (C. Difficile).  This occurs when a person takes antibiotics because some good germs are destroyed. Antibiotic use allows C. diificile to take over, putting patients at high risk for this serious infection.    As a patient or caregiver, it is important to understand your or your loved one's antibiotic treatment. It is especially important for caregivers to speak up when patients can't speak for themselves. Here are some important questions to ask your healthcare team.    What infection is this antibiotic treating and how do you know I have that infection?    What side effects might occur from this antibiotic?    How long will I need to take this antibiotic?    Is it safe to take this antibiotic with other medications or supplements (e.g., vitamins) that I am taking?     Are there any special directions I need to know about taking this antibiotic? For example, should I take it with food?    How will I be monitored to know whether my infection is responding to the antibiotic?    What tests may help to make sure the right antibiotic is prescribed for me?      Information provided by:  www.cdc.gov/getsmart  U.S. Department of Health and Human Services  Centers for disease Control and Prevention  National Center for Emerging and Zoonotic Infectious Diseases  Division of Healthcare Quality Promotion         Protect others around you: Learn how to safely use, store and throw away your medicines at www.disposemymeds.org.             Medication List: This is a list of all your medications and when to take them. Check marks below indicate your daily home schedule. Keep this list as a reference.      Medications           Morning Afternoon Evening Bedtime As Needed    albuterol 108 (90 BASE) MCG/ACT Inhaler   Commonly known as:  PROAIR HFA/PROVENTIL HFA/VENTOLIN HFA   Inhale 2 puffs into the lungs every 4 hours as needed for shortness of breath / dyspnea or wheezing                                * amphetamine-dextroamphetamine 20 MG per  "tablet   Commonly known as:  ADDERALL   Take 1 tablet (20 mg) by mouth 2 times daily                                * amphetamine-dextroamphetamine 20 MG per tablet   Commonly known as:  ADDERALL   Take 1 tablet (20 mg) by mouth 2 times daily                                * amphetamine-dextroamphetamine 20 MG per tablet   Commonly known as:  ADDERALL   Take 1 tablet (20 mg) by mouth 2 times daily   Start taking on:  10/2/2017                                COREG PO   Take 6.25 mg by mouth 2 times daily   Last time this was given:  6.25 mg on 9/23/2017  9:10 AM                                * cyanocobalamin 1000 MCG/ML injection   Commonly known as:  VITAMIN B12   Inject 1 mL into the muscle every 30 days   Last time this was given:  1,000 mcg on 9/21/2017  9:15 PM                                * cyanocobalamin 1000 MCG/ML injection   Commonly known as:  VITAMIN B12   Inject 1 mL (1,000 mcg) into the muscle every 30 days   Last time this was given:  1,000 mcg on 9/21/2017  9:15 PM                                * [place] [facility] MANAGED PATIENT   Contact [facility] for patient specific medication information at [phone] on admission and discharge from the hospital.  Phones are answered 24 hours a day 7 days a week 365 days a year.  Providers - Choose \"CONTINUE HOME MED (no script)\" at discharge if patient treatment with home infusion will continue.                                * [facility] MANAGED PATIENT   Contact [facility] for patient specific medication information at [phone] on admission and discharge from the hospital.  Phones are answered 24 hours a day 7 days a week 365 days a year.  Providers - Choose \"CONTINUE HOME MED (no script)\" at discharge if patient treatment with home infusion will continue.                                fentaNYL 50 mcg/hr 72 hr patch   Commonly known as:  DURAGESIC   Place 1 patch onto the skin every 48 hours MYLAN BRAND " "ONLY. Fill on/after 9/15/17 to start on/after 9/17/17   Last time this was given:  1 patch on 9/22/2017  9:36 AM                                lidocaine-prilocaine cream   Commonly known as:  EMLA   Apply topically as needed for moderate pain                                morphine 0.1% in intrasite topical gel   Apply as needed prior to accessing the port site.                                mupirocin 2 % ointment   Commonly known as:  BACTROBAN   Apply topically 3 times daily                                naloxone nasal spray   Commonly known as:  NARCAN   Spray 1 spray (4 mg) into one nostril alternating nostrils as needed for opioid reversal (every 2-3 minutes until assistance arrives.)                                Needle (Disp) 27G X 1/2\" Misc   Commonly known as:  BD DISP NEEDLES   1 Device every 30 days Use for cyanocobalamin injection once q 30 days.                                nystatin-triamcinolone cream   Commonly known as:  MYCOLOG II   Apply topically 2 times daily as needed                                ondansetron 8 MG ODT tab   Commonly known as:  ZOFRAN-ODT   Take 1 tablet (8 mg) by mouth every 8 hours as needed for nausea   Last time this was given:  4 mg on 9/22/2017  3:19 AM                                * order for DME   Injection Supplies for Vitamin B12: 3cc syringes w/ 27 gauge needles, 1/2 inch length                                * order for DME   Equipment being ordered: Bilateral knee high chronic venous insufficiency stockings--  mild-moderate pressures.                                oxyCODONE 5 MG/5ML solution   Commonly known as:  ROXICODONE   Take 10-15 mLs (10-15 mg) by mouth every 4 hours as needed for moderate to severe pain Max of 55 mg per day. Fill on/after 9/15/17 not to start till 9/17/17.   Last time this was given:  15 mg on 9/23/2017  9:11 AM                                sucralfate 1 GM/10ML suspension   Commonly known as:  CARAFATE   Take 10 mLs (1 g) by mouth " 4 times daily   Last time this was given:  1 g on 9/23/2017 12:31 PM                                vancomycin in 0.9% NaCl 1750 mg IVPB   Commonly known as:  VANCOCIN   Inject 500 mLs (1,750 mg) into the vein every 8 hours for 14 days                                vitamin D 52460 UNIT capsule   Commonly known as:  ERGOCALCIFEROL   Take 1 capsule (50,000 Units) by mouth every 7 days                                * Notice:  This list has 9 medication(s) that are the same as other medications prescribed for you. Read the directions carefully, and ask your doctor or other care provider to review them with you.

## 2017-09-19 NOTE — ED AVS SNAPSHOT
[facility] [place], Emergency Department    [address] [place] [address]    Phone:  [phone]                                       Parker Acevedo Sr   MRN: [id number]    Department:  [facility], Emergency Department   Date of Visit:  9/18/2017           After Visit Summary Signature Page     I have received my discharge instructions, and my questions have been answered. I have discussed any challenges I see with this plan with the nurse or doctor.    ..........................................................................................................................................  Patient/Patient Representative Signature      ..........................................................................................................................................  Patient Representative Print Name and Relationship to Patient    ..................................................               ................................................  Date                                            Time    ..........................................................................................................................................  Reviewed by Signature/Title    ...................................................              ..............................................  Date                                                            Time

## 2017-09-19 NOTE — ED NOTES
Pt arrives from home for a complaint of a fever, body aches, and chills today. PT reports history of a port infection with similar symptoms in the past.

## 2017-09-19 NOTE — ED PROVIDER NOTES
"  History     Chief Complaint   Patient presents with     Fever     HPI  Parker Acevedo Sr is a 46 year old male with a history of cardiomyopathy, chronic abdominal pain, GERD, hiatal hernia, short gut syndrome, and malnutrition on TPN, s/p Celestino-en-Y gastric bypass and multiple other surgeries, who presents for evaluation of fever. The patient reports he's had fevers throughout the day, measured to max of 101.5  F at home. He reports arthralgias, chills, headache, and fatigue associated. He denies recent URI type symptoms, no congestion, sore throat, cough, or shortness of breath. The patient has a PowerPort with prior history of port infection. No obvious signs of port infection noted recently, including no redness, drainage, tenderness. The patient predominantly complains of lower back pain at this time. Denies any abdominal pain. No vomiting. He denies urinary symptoms such as diarrhea or hematuria. He does report diarrhea that is chronic and unchanged from baseline. No rash or skin wounds.     No current facility-administered medications for this encounter.      Current Outpatient Prescriptions   Medication     oxyCODONE (ROXICODONE) 5 MG/5ML solution     fentaNYL (DURAGESIC) 50 mcg/hr 72 hr patch     Needle, Disp, (BD DISP NEEDLES) 27G X 1/2\" MISC     albuterol (PROAIR HFA/PROVENTIL HFA/VENTOLIN HFA) 108 (90 BASE) MCG/ACT Inhaler     vitamin D (ERGOCALCIFEROL) 44010 UNIT capsule     sucralfate (CARAFATE) 1 GM/10ML suspension     ondansetron (ZOFRAN-ODT) 8 MG ODT tab     cyanocobalamin (VITAMIN B12) 1000 MCG/ML injection     lidocaine-prilocaine (EMLA) cream     Carvedilol (COREG PO)     cyanocobalamin (VITAMIN B12) 1000 MCG/ML injection     mupirocin (BACTROBAN) 2 % ointment     naloxone (NARCAN) nasal spray     amphetamine-dextroamphetamine (ADDERALL) 20 MG per tablet     amphetamine-dextroamphetamine (ADDERALL) 20 MG per tablet     amphetamine-dextroamphetamine (ADDERALL) 20 MG per tablet     morphine " 0.1% in intrasite topical gel     [facility]NA[facility]ED PATIENT     nystatin-triamcinolone (MYCOLOG II) cream     order for DME     [facility]ED PATIENT     order for DME     [DISCONTINUED] NO ACTIVE MEDICATIONS     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)      Chronic abdominal pain      Difficulty swallowing      Gastric ulcer, unspecified as acute or chronic, without mention of hemorrhage, perforation, or obstruction      Gastro-oesophageal reflux disease      Head injury      Hiatal hernia      Other bladder disorder      Other chronic pain      Severe malnutrition (H)     TPN     Short gut syndrome      Tobacco abuse        Past Surgical History:   Procedure Laterality Date     APPENDECTOMY       BACK SURGERY  11/3/2014    curve in the spine     BIOPSY LYMPH NODE CERVICAL N/A 2/20/2015    Procedure: BIOPSY LYMPH NODE CERVICAL;  Surgeon: Baron Scanlon MD;  Location: [facility]     C GASTRIC BYPASS,OBESE<100CM SHAYLEE-EN-Y  2002    lost 300 pounds     CHOLECYSTECTOMY       DISCECTOMY, FUSION CERVICAL ANTERIOR ONE LEVEL, COMBINED N/A 2/15/2017    Procedure: COMBINED DISCECTOMY, FUSION CERVICAL ANTERIOR ONE LEVEL;  Surgeon: Darren Campos MD;  Location: [facility]     ENDOSCOPIC INSERTION TUBE GASTROSTOMY  9/9/2013    Procedure: ENDOSCOPIC INSERTION TUBE GASTROSTOMY;;  Surgeon: Francis Vyas MD;  [facility]     ENDOSCOPIC ULTRASOUND UPPER GASTROINTESTINAL TRACT (GI)  4/29/2011    Procedure:ENDOSCOPIC ULTRASOUND UPPER GASTROINTESTINAL TRACT (GI); Both Procedures done Conjointly; Surgeon:NEREIDA HOUSER; [facility]     ENDOSCOPIC ULTRASOUND UPPER GASTROINTESTINAL TRACT (GI)  9/9/2013    Procedure: ENDOSCOPIC ULTRASOUND UPPER GASTROINTESTINAL TRACT (GI);   Endoscopic Ultrasound Guide Gastrostomy Tube Placement  C-arm;  Surgeon: Noe Lizarraga MD;  [facility]     ENDOSCOPY  03/25/11    EGD, [facility]     ENDOSCOPY  08/04/09    Upper Endoscopy, [facility]     ENDOSCOPY  01/05/09    Upper Endoscopy, [facility]     ESOPHAGOSCOPY, GASTROSCOPY, DUODENOSCOPY (EGD), COMBINED  4/20/2011    Procedure:COMBINED ESOPHAGOSCOPY, GASTROSCOPY, DUODENOSCOPY (EGD); Surgeon:BLU VEGA; [facility]     ESOPHAGOSCOPY, GASTROSCOPY, DUODENOSCOPY (EGD), COMBINED  6/15/2011    Procedure:COMBINED ESOPHAGOSCOPY, GASTROSCOPY, DUODENOSCOPY (EGD); Surgeon:BLU VEGA; [facility]     ESOPHAGOSCOPY, GASTROSCOPY, DUODENOSCOPY (EGD), COMBINED  6/12/2013    Procedure: COMBINED ESOPHAGOSCOPY, GASTROSCOPY, DUODENOSCOPY (EGD);;  Surgeon: Blu Vega MD;  [facility]     ESOPHAGOSCOPY, GASTROSCOPY, DUODENOSCOPY (EGD), COMBINED  11/22/2013    Procedure: COMBINED ESOPHAGOSCOPY, GASTROSCOPY, DUODENOSCOPY (EGD);;  Surgeon: Blu Vega MD;  Location:  OR     ESOPHAGOSCOPY, GASTROSCOPY, DUODENOSCOPY (EGD), COMBINED  4/30/2014    Procedure: COMBINED ESOPHAGOSCOPY, GASTROSCOPY, DUODENOSCOPY (EGD);  Surgeon: Blu Vega MD;  [facility]     ESOPHAGOSCOPY, GASTROSCOPY, DUODENOSCOPY (EGD), COMBINED N/A 2/20/2015    Procedure: COMBINED ESOPHAGOSCOPY, GASTROSCOPY, DUODENOSCOPY (EGD), BIOPSY SINGLE OR MULTIPLE;  Surgeon: Baron Scanlon MD;  Location:  OR     ESOPHAGOSCOPY, GASTROSCOPY, DUODENOSCOPY (EGD), COMBINED N/A 9/30/2015    Procedure: COMBINED ESOPHAGOSCOPY, GASTROSCOPY, DUODENOSCOPY (EGD);  Surgeon: Blu Vega MD;  [facility]     GASTRECTOMY  6/22/2012    Procedure: GASTRECTOMY;  Open Approach, Excise Ulcers,Partial Gastrectomy, Esophagojejunostomy, Hiatal Hernia Repair, Extensive Lysis of Adhesions and Esaphagogastrodudenoscopy.;  Surgeon: Blu Vega MD;  Location:  OR      GASTROJEJUNOSTOMY  08/26/09    Extensice enterolysis, partial resect. jejunum, part. resect gastric pouch, gastrojejunostomy anastomosis     HC ESOPH/GAS REFLUX TEST W NASAL IMPED ELECTRODE  8/5/2013    Procedure: ESOPHAGEAL IMPEDENCE FUNCTION TEST 1 HOUR OR LESS;  Surgeon: Halie Lang MD;  [facility]     HEAD & NECK SURGERY  2/15/2017    C5-C6     HERNIA REPAIR  2006    Umbilical hernia     HERNIORRHAPHY HIATAL  6/22/2012    Procedure: HERNIORRHAPHY HIATAL;;  Surgeon: Francis Vyas MD;  [facility]     HERNIORRHAPHY INGUINAL  11/22/2013    Procedure: HERNIORRHAPHY INGUINAL;;  Surgeon: Francis Vyas MD;  [facility]     LAPAROTOMY EXPLORATORY  11/22/2013    Procedure: LAPAROTOMY EXPLORATORY;  Exploratory Laparotomy, Upper Endoscopy, Left Inguinal Hernia Repair;  Surgeon: Francis Vyas MD;  [facility]     PICC INSERTION Right 03/16/2017    5fr DL BioFlo PICC, 42cm (3cm external) in the R medial brachial vein w/ tip in the SVC RA junction.     PICC INSERTION Left 09/23/2017    5fr DL BioFlo PICC, 45cm (1cm external) in the L basilic vein w/ tip in the SVC RA junction.     SHAYLEE EN Y BOWEL  2003     SOFT TISSUE SURGERY       SOFT TISSUE SURGERY       TONSILLECTOMY         Family History   Problem Relation Age of Onset     GASTROINTESTINAL DISEASE Mother      Crohns disease     Anxiety Disorder Mother      Thyroid Disease Mother      Grave's disease     CANCER Father      ear cancer-skin cancer/melanoma     Breast Cancer Maternal Grandmother      DIABETES Maternal Uncle      Breast Cancer Other      Hypertension No family hx of      Hyperlipidemia No family hx of      CEREBROVASCULAR DISEASE No family hx of      Prostate Cancer No family hx of      Depression No family hx of      Anesthesia Reaction No family hx of      Asthma No family hx of      OSTEOPOROSIS No family hx of      Genetic Disorder No family hx of      Obesity No family hx of      MENTAL ILLNESS No  family hx of      Substance Abuse No family hx of        Social History   Substance Use Topics     Smoking status: Light Tobacco Smoker     Packs/day: 0.10     Years: 3.00     Types: Cigarettes     Smokeless tobacco: Current User      Comment: I use an e cig every now and than     Alcohol use No      Comment: quit         Allergies   Allergen Reactions     Bactrim [Sulfamethoxazole W/Trimethoprim] Rash     Penicillins Anaphylaxis     Doxycycline Rash     Vancomycin Rash     Rash after receiving vancomycin 3/28/16 (red man's?). Tolerated with slower infusion and diphenhydramine premed.     I have reviewed the Medications, Allergies, Past Medical and Surgical History, and Social History in the Epic system.    Review of Systems   Constitutional: Positive for fatigue and fever.   HENT: Negative for congestion, rhinorrhea and sore throat.    Respiratory: Negative for cough, chest tightness and shortness of breath.    Cardiovascular: Negative for chest pain and palpitations.   Gastrointestinal: Positive for diarrhea (chronic). Negative for abdominal pain and vomiting.   Musculoskeletal: Positive for arthralgias, back pain and myalgias. Negative for neck pain and neck stiffness.   Skin: Negative for color change, rash and wound.   Neurological: Negative for dizziness, syncope and headaches.   Hematological: Does not bruise/bleed easily.   Psychiatric/Behavioral: Negative for confusion.   All other systems reviewed and are negative.      Physical Exam   BP: 129/66  Heart Rate: 96  Temp: 100.2  F (37.9  C)  Resp: 16  SpO2: 100 %  Physical Exam   Constitutional: He appears well-developed and well-nourished. No distress.   HENT:   Head: Normocephalic and atraumatic.   Right Ear: Tympanic membrane and ear canal normal.   Left Ear: Tympanic membrane and ear canal normal.   Nose: Nose normal.   Mouth/Throat: Uvula is midline, oropharynx is clear and moist and mucous membranes are normal. No oral lesions. No uvula swelling.    Eyes: Conjunctivae are normal.   Neck: Normal range of motion. Neck supple.   Cardiovascular: Normal rate, regular rhythm, normal heart sounds and intact distal pulses.    Pulmonary/Chest: Effort normal and breath sounds normal. No respiratory distress.   Abdominal: Soft. There is no tenderness.   Musculoskeletal: He exhibits no edema or tenderness.   Neurological: He is alert.   Skin: Skin is warm and dry. No rash noted. No erythema.   Psychiatric: He has a normal mood and affect. His behavior is normal.   Nursing note and vitals reviewed.      ED Course     ED Course     Procedures             Critical Care time:  none             Labs Ordered and Resulted from Time of ED Arrival Up to the Time of Departure from the ED   CBC WITH PLATELETS DIFFERENTIAL - Abnormal; Notable for the following:        Result Value    RBC Count 4.13 (*)     Hemoglobin 12.2 (*)     Hematocrit 37.9 (*)     RDW 16.5 (*)     Platelet Count 128 (*)     All other components within normal limits   BASIC METABOLIC PANEL - Abnormal; Notable for the following:     Calcium 7.9 (*)     All other components within normal limits   ROUTINE UA WITH MICROSCOPIC REFLEX TO CULTURE - Abnormal; Notable for the following:     Blood Urine Trace (*)     Mucous Urine Present (*)     All other components within normal limits   LACTIC ACID WHOLE BLOOD   PERIPHERAL IV CATHETER            Assessments & Plan (with Medical Decision Making)   Fever. Chest XR indicates probable pneumonia. No other apparent source. He does not appear ill. No evidence of sepsis. Cultures collected of the blood and will need to be followed up on. Will start on treatment for probable pneumonia and have patient follow up in clinic this week and to return if persistent, new, or worsening symptoms. He is hemodynamically stable and not hypoxic. There is no evidence of sepsis. Patient expressed understanding of the provisional diagnosis and the need for follow up.    I have reviewed the  nursing notes.    I have reviewed the findings, diagnosis, plan and need for follow up with the patient.    Discharge Medication List as of 9/19/2017  3:21 AM      START taking these medications    Details   azithromycin (ZITHROMAX Z-ISABELLA) 250 MG tablet Two tablets on the first day, then one tablet daily for the next 4 days, Disp-6 tablet, R-0, Local Print             Final diagnoses:   Pneumonia of lower lobe due to infectious organism, unspecified laterality (H)     I, Sergey Douglas, am serving as a trained medical scribe to document services personally performed by Hubert Skinner MD, based on the provider's statements to me.      IHubert MD, was physically present and have reviewed and verified the accuracy of this note documented by Sergey Douglas.    9/18/2017   [facility], [place], EMERGENCY DEPARTMENT     Hubert Skinner MD  10/19/17 4887

## 2017-09-19 NOTE — ED NOTES
10:48 AM  I received a phone call from microbiology lab informing me that the patient's blood culture is growing Gram + cocci. I attempted to contact the patient on his home and cell phones, but there was no answer. I called his wife, his emergency contact, as well but there was no answer. I left a voicemail for the patient to call back.     Leah Jimenez MD  09/19/17 1051      ADDENDUM  Patient's wife called back to the emergency department. She was informed that patient has a positive blood culture. I recommended that patient returns to the emergency department immediately for further evaluation and management. She agreed to drive him to the emergency department today.     Leah Jimenez MD  09/19/17 [id number]

## 2017-09-20 ENCOUNTER — CARE COORDINATION (OUTPATIENT)
Dept: CARE COORDINATION | Facility: CLINIC | Age: 47
End: 2017-09-20

## 2017-09-20 ENCOUNTER — MYC MEDICAL ADVICE (OUTPATIENT)
Dept: INFECTIOUS DISEASES | Facility: CLINIC | Age: 47
End: 2017-09-20

## 2017-09-20 ENCOUNTER — APPOINTMENT (OUTPATIENT)
Dept: ULTRASOUND IMAGING | Facility: CLINIC | Age: 47
DRG: 270 | End: 2017-09-20
Attending: FAMILY MEDICINE
Payer: COMMERCIAL

## 2017-09-20 LAB
ALBUMIN SERPL-MCNC: 3 G/DL (ref 3.4–5)
ALP SERPL-CCNC: 115 U/L (ref 40–150)
ALT SERPL W P-5'-P-CCNC: 192 U/L (ref 0–70)
ANION GAP SERPL CALCULATED.3IONS-SCNC: 6 MMOL/L (ref 3–14)
AST SERPL W P-5'-P-CCNC: 135 U/L (ref 0–45)
BASOPHILS # BLD AUTO: 0 10E9/L (ref 0–0.2)
BASOPHILS NFR BLD AUTO: 0.4 %
BILIRUB SERPL-MCNC: 0.5 MG/DL (ref 0.2–1.3)
BUN SERPL-MCNC: 8 MG/DL (ref 7–30)
CALCIUM SERPL-MCNC: 7.6 MG/DL (ref 8.5–10.1)
CHLORIDE SERPL-SCNC: 106 MMOL/L (ref 94–109)
CO2 SERPL-SCNC: 27 MMOL/L (ref 20–32)
CREAT SERPL-MCNC: 0.67 MG/DL (ref 0.66–1.25)
CRP SERPL-MCNC: 57 MG/L (ref 0–8)
CRP SERPL-MCNC: 82 MG/L (ref 0–8)
DIFFERENTIAL METHOD BLD: ABNORMAL
EOSINOPHIL # BLD AUTO: 0.1 10E9/L (ref 0–0.7)
EOSINOPHIL NFR BLD AUTO: 1.1 %
ERYTHROCYTE [DISTWIDTH] IN BLOOD BY AUTOMATED COUNT: 16.6 % (ref 10–15)
GFR SERPL CREATININE-BSD FRML MDRD: >90 ML/MIN/1.7M2
GLUCOSE BLDC GLUCOMTR-MCNC: 82 MG/DL (ref 70–99)
GLUCOSE SERPL-MCNC: 100 MG/DL (ref 70–99)
HCT VFR BLD AUTO: 37.6 % (ref 40–53)
HGB BLD-MCNC: 11.9 G/DL (ref 13.3–17.7)
IMM GRANULOCYTES # BLD: 0 10E9/L (ref 0–0.4)
IMM GRANULOCYTES NFR BLD: 0.6 %
INTERPRETATION ECG - MUSE: NORMAL
LYMPHOCYTES # BLD AUTO: 0.8 10E9/L (ref 0.8–5.3)
LYMPHOCYTES NFR BLD AUTO: 14.3 %
MCH RBC QN AUTO: 29 PG (ref 26.5–33)
MCHC RBC AUTO-ENTMCNC: 31.6 G/DL (ref 31.5–36.5)
MCV RBC AUTO: 92 FL (ref 78–100)
MONOCYTES # BLD AUTO: 0.3 10E9/L (ref 0–1.3)
MONOCYTES NFR BLD AUTO: 4.9 %
NEUTROPHILS # BLD AUTO: 4.2 10E9/L (ref 1.6–8.3)
NEUTROPHILS NFR BLD AUTO: 78.7 %
NRBC # BLD AUTO: 0 10*3/UL
NRBC BLD AUTO-RTO: 0 /100
PLATELET # BLD AUTO: 104 10E9/L (ref 150–450)
POTASSIUM SERPL-SCNC: 3.4 MMOL/L (ref 3.4–5.3)
PROT SERPL-MCNC: 6.4 G/DL (ref 6.8–8.8)
RBC # BLD AUTO: 4.11 10E12/L (ref 4.4–5.9)
SODIUM SERPL-SCNC: 139 MMOL/L (ref 133–144)
WBC # BLD AUTO: 5.3 10E9/L (ref 4–11)

## 2017-09-20 PROCEDURE — 25000132 ZZH RX MED GY IP 250 OP 250 PS 637: Performed by: STUDENT IN AN ORGANIZED HEALTH CARE EDUCATION/TRAINING PROGRAM

## 2017-09-20 PROCEDURE — 25000125 ZZHC RX 250: Performed by: STUDENT IN AN ORGANIZED HEALTH CARE EDUCATION/TRAINING PROGRAM

## 2017-09-20 PROCEDURE — 80053 COMPREHEN METABOLIC PANEL: CPT | Performed by: STUDENT IN AN ORGANIZED HEALTH CARE EDUCATION/TRAINING PROGRAM

## 2017-09-20 PROCEDURE — 85025 COMPLETE CBC W/AUTO DIFF WBC: CPT | Performed by: STUDENT IN AN ORGANIZED HEALTH CARE EDUCATION/TRAINING PROGRAM

## 2017-09-20 PROCEDURE — 87040 BLOOD CULTURE FOR BACTERIA: CPT | Performed by: STUDENT IN AN ORGANIZED HEALTH CARE EDUCATION/TRAINING PROGRAM

## 2017-09-20 PROCEDURE — 25000128 H RX IP 250 OP 636: Performed by: STUDENT IN AN ORGANIZED HEALTH CARE EDUCATION/TRAINING PROGRAM

## 2017-09-20 PROCEDURE — 00000146 ZZHCL STATISTIC GLUCOSE BY METER IP

## 2017-09-20 PROCEDURE — 86706 HEP B SURFACE ANTIBODY: CPT | Performed by: STUDENT IN AN ORGANIZED HEALTH CARE EDUCATION/TRAINING PROGRAM

## 2017-09-20 PROCEDURE — 86803 HEPATITIS C AB TEST: CPT | Performed by: STUDENT IN AN ORGANIZED HEALTH CARE EDUCATION/TRAINING PROGRAM

## 2017-09-20 PROCEDURE — 40000802 ZZH SITE CHECK

## 2017-09-20 PROCEDURE — 87340 HEPATITIS B SURFACE AG IA: CPT | Performed by: STUDENT IN AN ORGANIZED HEALTH CARE EDUCATION/TRAINING PROGRAM

## 2017-09-20 PROCEDURE — 36592 COLLECT BLOOD FROM PICC: CPT | Performed by: STUDENT IN AN ORGANIZED HEALTH CARE EDUCATION/TRAINING PROGRAM

## 2017-09-20 PROCEDURE — 12000001 ZZH R&B MED SURG/OB UMMC

## 2017-09-20 PROCEDURE — 25000128 H RX IP 250 OP 636

## 2017-09-20 PROCEDURE — 86140 C-REACTIVE PROTEIN: CPT | Performed by: STUDENT IN AN ORGANIZED HEALTH CARE EDUCATION/TRAINING PROGRAM

## 2017-09-20 PROCEDURE — 76705 ECHO EXAM OF ABDOMEN: CPT

## 2017-09-20 RX ORDER — DEXTROAMPHETAMINE SACCHARATE, AMPHETAMINE ASPARTATE, DEXTROAMPHETAMINE SULFATE AND AMPHETAMINE SULFATE 1.25; 1.25; 1.25; 1.25 MG/1; MG/1; MG/1; MG/1
20 TABLET ORAL 2 TIMES DAILY
Status: DISCONTINUED | OUTPATIENT
Start: 2017-09-20 | End: 2017-09-23 | Stop reason: HOSPADM

## 2017-09-20 RX ORDER — CYANOCOBALAMIN 1000 UG/ML
1 INJECTION, SOLUTION INTRAMUSCULAR; SUBCUTANEOUS
COMMUNITY
End: 2018-06-01

## 2017-09-20 RX ADMIN — FENTANYL 1 PATCH: 50 PATCH, EXTENDED RELEASE TRANSDERMAL at 09:11

## 2017-09-20 RX ADMIN — SUCRALFATE 1 G: 1 SUSPENSION ORAL at 09:16

## 2017-09-20 RX ADMIN — CARVEDILOL 6.25 MG: 6.25 TABLET, FILM COATED ORAL at 17:45

## 2017-09-20 RX ADMIN — VANCOMYCIN HYDROCHLORIDE 1750 MG: 10 INJECTION, POWDER, LYOPHILIZED, FOR SOLUTION INTRAVENOUS at 21:26

## 2017-09-20 RX ADMIN — OXYCODONE HYDROCHLORIDE 15 MG: 5 SOLUTION ORAL at 22:47

## 2017-09-20 RX ADMIN — VANCOMYCIN HYDROCHLORIDE 1750 MG: 10 INJECTION, POWDER, LYOPHILIZED, FOR SOLUTION INTRAVENOUS at 11:38

## 2017-09-20 RX ADMIN — DIPHENHYDRAMINE HYDROCHLORIDE 50 MG: 50 INJECTION, SOLUTION INTRAMUSCULAR; INTRAVENOUS at 11:38

## 2017-09-20 RX ADMIN — ONDANSETRON 4 MG: 4 TABLET, ORALLY DISINTEGRATING ORAL at 05:00

## 2017-09-20 RX ADMIN — DIPHENHYDRAMINE HYDROCHLORIDE 50 MG: 50 INJECTION, SOLUTION INTRAMUSCULAR; INTRAVENOUS at 20:49

## 2017-09-20 RX ADMIN — OXYCODONE HYDROCHLORIDE 15 MG: 5 SOLUTION ORAL at 00:31

## 2017-09-20 RX ADMIN — SUCRALFATE 1 G: 1 SUSPENSION ORAL at 14:33

## 2017-09-20 RX ADMIN — CARVEDILOL 6.25 MG: 6.25 TABLET, FILM COATED ORAL at 00:45

## 2017-09-20 RX ADMIN — CARVEDILOL 6.25 MG: 6.25 TABLET, FILM COATED ORAL at 09:16

## 2017-09-20 RX ADMIN — ONDANSETRON 4 MG: 4 TABLET, ORALLY DISINTEGRATING ORAL at 22:47

## 2017-09-20 RX ADMIN — SODIUM CHLORIDE: 9 INJECTION, SOLUTION INTRAVENOUS at 01:27

## 2017-09-20 RX ADMIN — OXYCODONE HYDROCHLORIDE 10 MG: 5 SOLUTION ORAL at 16:02

## 2017-09-20 RX ADMIN — SUCRALFATE 1 G: 1 SUSPENSION ORAL at 16:02

## 2017-09-20 RX ADMIN — DEXTROAMPHETAMINE SACCHARATE, AMPHETAMINE ASPARTATE, DEXTROAMPHETAMINE SULFATE AND AMPHETAMINE SULFATE 20 MG: 1.25; 1.25; 1.25; 1.25 TABLET ORAL at 09:12

## 2017-09-20 RX ADMIN — DEXTROAMPHETAMINE SACCHARATE, AMPHETAMINE ASPARTATE, DEXTROAMPHETAMINE SULFATE AND AMPHETAMINE SULFATE 20 MG: 1.25; 1.25; 1.25; 1.25 TABLET ORAL at 20:48

## 2017-09-20 RX ADMIN — SUCRALFATE 1 G: 1 SUSPENSION ORAL at 21:28

## 2017-09-20 RX ADMIN — OXYCODONE HYDROCHLORIDE 10 MG: 5 SOLUTION ORAL at 11:37

## 2017-09-20 RX ADMIN — SUCRALFATE 1 G: 1 SUSPENSION ORAL at 00:31

## 2017-09-20 RX ADMIN — OXYCODONE HYDROCHLORIDE 15 MG: 5 SOLUTION ORAL at 05:00

## 2017-09-20 ASSESSMENT — ENCOUNTER SYMPTOMS
RHINORRHEA: 0
WEAKNESS: 0
SORE THROAT: 0
EYE PAIN: 0
FATIGUE: 1
HEADACHES: 1
BACK PAIN: 1
CONSTIPATION: 0
DYSURIA: 0
NUMBNESS: 0
ARTHRALGIAS: 1
FEVER: 1
NAUSEA: 1
ABDOMINAL PAIN: 0
DIARRHEA: 0
MYALGIAS: 1
COUGH: 1
VOMITING: 0
DIZZINESS: 0
NECK STIFFNESS: 0
LIGHT-HEADEDNESS: 0
SHORTNESS OF BREATH: 0
CHILLS: 1

## 2017-09-20 NOTE — PROGRESS NOTES
Clinic Care Coordination Contact    Situation: Patient chart reviewed by care coordinator.    Background: RN CLARITZA received ED visit notification from 9/19/17.    Assessment: Patient went to the ED a 2nd time on 9/19/17 and was admitted for bacteremia.    Plan/Recommendations: RN CLARITZA will monitor for patient discharge from hospital.    Melissa Behl BSN, RN, N  [facility] Care Coordinator  [phone]  [email or web address]

## 2017-09-20 NOTE — H&P
Attestation:  This patient has been seen and evaluated by me, Antoine Bonner on 9/20/2017.  I saw and discussed the case with the primary resident and the care team. I agree with the findings and plan in this note. I have reviewed today's vital signs, medications, laboratory results and imaging results.    Antoine Bonner MD  [facility]  Inpatient History and Physical    Parker Acevdeo Sr MRN# [id number]   Age: 46 year old YOB: [birth year] 10:05 PM    Primary care provider: Esteban Daly          Chief Concern:   Fevers, chills, generalized body aches  Positive blood culture       History is obtained from the patient and patient's wife.          History of Present Illness (Resident / Clinician):   Parker Acevedo Sr is a 46 year old  male with a significant past medical history of gm-en-y gastric bypass complicated by short gut syndrome now dependant on chronic TPN, multiple episodes of bacteremia secondary to infected PICC/port, PUD, anemia, and ADHD who presents with fevers, chills and positive blood culture. Parker was seen early morning of 9/19 for fever and chills with general malaise. Work up was remarkable for questionable pneumonia on CXR and Parker was sent home with azithromycin but did not take any of the antibiotic. Blood cultures were also collected and a preliminary read later in the evening was positive for gram positive cocci from both a peripheral site in right arm and portacath. Parker was then notified and instructed to return to ED for treatment.     Parker reports a 2 day history of fevers and chills. Reports Tmax of 101F measured at home yesterday with home electronic thermometer. Tmax today was measured at 103F. Associated symptoms include generalized body aches and joint pain, fatigue, headaches and chills. Denies neck stiffness or  rigidity, vision changes, eye pain, vomiting, weakness or numbness/tingling. No change in mental status per wife.     Parker reports a history of multiple line infections in the past resulting in fevers and chills similar to today's presenting symptoms. Most recently had blood cultures positive for staph epidermidis from port (6/26/2017) for which he was hospitalized and treated with 1 dose of vancomycin and had his port replaced.     Old records were reviewed, and any additions to pertinent history are noted above.           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)      Chronic abdominal pain      Difficulty swallowing      Gastric ulcer, unspecified as acute or chronic, without mention of hemorrhage, perforation, or obstruction      Gastro-oesophageal reflux disease      Head injury      Hiatal hernia      Other bladder disorder      Other chronic pain      Severe malnutrition (H)     TPN     Short gut syndrome      Tobacco abuse              Past Surgical History:     Past Surgical History:   Procedure Laterality Date     APPENDECTOMY       BACK SURGERY  11/3/2014    curve in the spine     BIOPSY LYMPH NODE CERVICAL N/A 2/20/2015    Procedure: BIOPSY LYMPH NODE CERVICAL;  Surgeon: Baron Scanlon MD;  Location:  OR     C GASTRIC BYPASS,OBESE<100CM SHAYLEE-EN-Y  2002    lost 300 pounds     CHOLECYSTECTOMY       DISCECTOMY, FUSION CERVICAL ANTERIOR ONE LEVEL, COMBINED N/A 2/15/2017    Procedure: COMBINED DISCECTOMY, FUSION CERVICAL ANTERIOR ONE LEVEL;  Surgeon: Darren Campos MD;  Location: [facility] OR     ENDOSCOPIC INSERTION TUBE GASTROSTOMY  9/9/2013    Procedure: ENDOSCOPIC INSERTION TUBE GASTROSTOMY;;  Surgeon: Francis Vyas MD;  Location: [facility] OR     ENDOSCOPIC  ULTRASOUND UPPER GASTROINTESTINAL TRACT (GI)  4/29/2011    Procedure:ENDOSCOPIC ULTRASOUND UPPER GASTROINTESTINAL TRACT (GI); Both Procedures done Conjointly; Surgeon:NEREIDA HOUSER; Location:[facility]     ENDOSCOPIC ULTRASOUND UPPER GASTROINTESTINAL TRACT (GI)  9/9/2013    Procedure: ENDOSCOPIC ULTRASOUND UPPER GASTROINTESTINAL TRACT (GI);  Endoscopic Ultrasound Guide Gastrostomy Tube Placement  C-arm;  Surgeon: Noe Lizarraga MD;  Location: [facility] OR     ENDOSCOPY  03/25/11    EGD, [facility]     ENDOSCOPY  08/04/09    Upper Endoscopy, [facility]     ENDOSCOPY  01/05/09    Upper Endoscopy, [facility]     ESOPHAGOSCOPY, GASTROSCOPY, DUODENOSCOPY (EGD), COMBINED  4/20/2011    Procedure:COMBINED ESOPHAGOSCOPY, GASTROSCOPY, DUODENOSCOPY (EGD); Surgeon:FRANCIS VYAS; Location[facility]     ESOPHAGOSCOPY, GASTROSCOPY, DUODENOSCOPY (EGD), COMBINED  6/15/2011    Procedure:COMBINED ESOPHAGOSCOPY, GASTROSCOPY, DUODENOSCOPY (EGD); Surgeon:FRANCIS VYAS; [facility]     ESOPHAGOSCOPY, GASTROSCOPY, DUODENOSCOPY (EGD), COMBINED  6/12/2013    Procedure: COMBINED ESOPHAGOSCOPY, GASTROSCOPY, DUODENOSCOPY (EGD);;  Surgeon: Francis Vyas MD;  Location: [facility] GI     ESOPHAGOSCOPY, GASTROSCOPY, DUODENOSCOPY (EGD), COMBINED  11/22/2013    Procedure: COMBINED ESOPHAGOSCOPY, GASTROSCOPY, DUODENOSCOPY (EGD);;  Surgeon: Francis Vyas MD;  [facility]     ESOPHAGOSCOPY, GASTROSCOPY, DUODENOSCOPY (EGD), COMBINED  4/30/2014    Procedure: COMBINED ESOPHAGOSCOPY, GASTROSCOPY, DUODENOSCOPY (EGD);  Surgeon: Francis Vyas MD;  [facility]     ESOPHAGOSCOPY, GASTROSCOPY, DUODENOSCOPY (EGD), COMBINED N/A 2/20/2015    Procedure: COMBINED ESOPHAGOSCOPY, GASTROSCOPY, DUODENOSCOPY (EGD), BIOPSY SINGLE OR MULTIPLE;  Surgeon: Baron Scanlon MD;  Location:  OR     ESOPHAGOSCOPY, GASTROSCOPY, DUODENOSCOPY (EGD), COMBINED N/A 9/30/2015    Procedure: COMBINED ESOPHAGOSCOPY, GASTROSCOPY,  DUODENOSCOPY (EGD);  Surgeon: Francis Vyas MD;  Location:  GI     GASTRECTOMY  6/22/2012    Procedure: GASTRECTOMY;  Open Approach, Excise Ulcers,Partial Gastrectomy, Esophagojejunostomy, Hiatal Hernia Repair, Extensive Lysis of Adhesions and Esaphagogastrodudenoscopy.;  Surgeon: Francis Vyas MD;  Location: [facility]     GASTROJEJUNOSTOMY  08/26/09    Extensice enterolysis, partial resect. jejunum, part. resect gastric pouch, gastrojejunostomy anastomosis     HC ESOPH/GAS REFLUX TEST W NASAL IMPED ELECTRODE  8/5/2013    Procedure: ESOPHAGEAL IMPEDENCE FUNCTION TEST 1 HOUR OR LESS;  Surgeon: Halie Lang MD;  Location:  GI     HEAD & NECK SURGERY  2/15/2017    C5-C6     HERNIA REPAIR  2006    Umbilical hernia     HERNIORRHAPHY HIATAL  6/22/2012    Procedure: HERNIORRHAPHY HIATAL;;  Surgeon: Francis Vyas MD;  [facility]     HERNIORRHAPHY INGUINAL  11/22/2013    Procedure: HERNIORRHAPHY INGUINAL;;  Surgeon: Francis Vyas MD;  [facility]     LAPAROTOMY EXPLORATORY  11/22/2013    Procedure: LAPAROTOMY EXPLORATORY;  Exploratory Laparotomy, Upper Endoscopy, Left Inguinal Hernia Repair;  Surgeon: Francis Vyas MD;  Location: [facility]     PICC INSERTION Right 03/16/2017    5fr DL BioFlo PICC, 42cm (3cm external) in the R medial brachial vein w/ tip in the SVC RA junction.     SHAYLEE EN Y BOWEL  2003     SOFT TISSUE SURGERY       SOFT TISSUE SURGERY       TONSILLECTOMY               Social History:     Social History   Substance Use Topics     Smoking status: Light Tobacco Smoker     Packs/day: 0.10     Years: 3.00     Types: Cigarettes     Smokeless tobacco: Current User      Comment: I use an e cig every now and than     Alcohol use No      Comment: quit       no alcohol use  no illicit drug use           Family History:     Family History   Problem Relation Age of Onset     GASTROINTESTINAL DISEASE Mother      Crohns disease     Anxiety Disorder Mother      Thyroid  Disease Mother      Grave's disease     CANCER Father      ear cancer-skin cancer/melanoma     Breast Cancer Maternal Grandmother      DIABETES Maternal Uncle      Breast Cancer Other      Hypertension No family hx of      Hyperlipidemia No family hx of      CEREBROVASCULAR DISEASE No family hx of      Prostate Cancer No family hx of      Depression No family hx of      Anesthesia Reaction No family hx of      Asthma No family hx of      OSTEOPOROSIS No family hx of      Genetic Disorder No family hx of      Obesity No family hx of      MENTAL ILLNESS No family hx of      Substance Abuse No family hx of      Family history reviewed            Immunizations:   Immunizations are current          Allergies:   Bactrim [sulfamethoxazole w/trimethoprim]; Penicillins; Doxycycline; and Vancomycin           Medications:     Current Facility-Administered Medications on File Prior to Encounter:  [COMPLETED] oxyCODONE (ROXICODONE) solution 15 mg   [COMPLETED] ondansetron (ZOFRAN) injection 4 mg   [DISCONTINUED] lidocaine 1 % 1 mL   [DISCONTINUED] lidocaine (LMX4) kit   [DISCONTINUED] sodium chloride (PF) 0.9% PF flush 3 mL   [DISCONTINUED] sodium chloride (PF) 0.9% PF flush 3 mL   DOBUTamine 500 mg in dextrose 5% 250 mL (adult std)     Current Outpatient Prescriptions on File Prior to Encounter:  azithromycin (ZITHROMAX Z-ISABELLA) 250 MG tablet Two tablets on the first day, then one tablet daily for the next 4 days   mupirocin (BACTROBAN) 2 % ointment Apply topically 3 times daily   diazepam (VALIUM) 5 MG tablet Take 1 tablet (5 mg) by mouth every 12 hours as needed for anxiety or sleep (Patient not taking: Reported on 9/18/2017)   oxyCODONE (ROXICODONE) 5 MG/5ML solution Take 10-15 mLs (10-15 mg) by mouth every 4 hours as needed for moderate to severe pain Max of 55 mg per day. Fill on/after 9/15/17 not to start till 9/17/17.   fentaNYL (DURAGESIC) 50 mcg/hr 72 hr patch Place 1 patch onto the skin every 48 hours MYLAN BRAND ONLY.  "Fill on/after 9/15/17 to start on/after 9/17/17   naloxone (NARCAN) nasal spray Spray 1 spray (4 mg) into one nostril alternating nostrils as needed for opioid reversal (every 2-3 minutes until assistance arrives.)   ondansetron (ZOFRAN-ODT) 8 MG ODT tab Take 1 tablet (8 mg) by mouth every 8 hours as needed for nausea   cyanocobalamin (VITAMIN B12) 1000 MCG/ML injection Inject 1 mL (1,000 mcg) into the muscle every 30 days   lidocaine-prilocaine (EMLA) cream Apply topically as needed for moderate pain   [START ON 10/2/2017] amphetamine-dextroamphetamine (ADDERALL) 20 MG per tablet Take 1 tablet (20 mg) by mouth 2 times daily   amphetamine-dextroamphetamine (ADDERALL) 20 MG per tablet Take 1 tablet (20 mg) by mouth 2 times daily   amphetamine-dextroamphetamine (ADDERALL) 20 MG per tablet Take 1 tablet (20 mg) by mouth 2 times daily   diazepam (VALIUM) 5 MG tablet Take 0.5 tablets (2.5 mg) by mouth daily as needed for anxiety or sleep (Patient not taking: Reported on 9/18/2017)   carvedilol (COREG) 3.125 MG tablet Take 1 tablet (3.125 mg) by mouth 2 times daily (with meals)   Needle, Disp, (BD DISP NEEDLES) 27G X 1/2\" MISC 1 Device every 30 days Use for cyanocobalamin injection once q 30 days.   sucralfate (CARAFATE) 1 GM/10ML suspension Take 10 mLs (1 g) by mouth 4 times daily   morphine 0.1% in intrasite topical gel Apply as needed prior to accessing the port site.   vitamin D (ERGOCALCIFEROL) 12980 UNIT capsule Take 1 capsule (50,000 Units) by mouth every 7 days   albuterol (PROAIR HFA/PROVENTIL HFA/VENTOLIN HFA) 108 (90 BASE) MCG/ACT Inhaler Inhale 2 puffs into the lungs every 4 hours as needed for shortness of breath / dyspnea or wheezing   [facility] INFUSION MANAGED PATIENT Contact [facility] for patient specific medication information at [phone] on admission and discharge from the hospital.  Phones are answered 24 hours a day 7 days a week 365 days a year.Providers - Choose \"CONTINUE HOME " "MED (no script)\" at discharge if patient treatment with home infusion will continue.   nystatin-triamcinolone (MYCOLOG II) cream Apply topically 2 times daily as needed   order for DME Equipment being ordered: Bilateral knee high chronic venous insufficiency stockings--  mild-moderate pressures.   [facility] INFUSION MANAGED PATIENT Contact [facility] for patient specific medication information at [phone] on admission and discharge from the hospital.  Phones are answered 24 hours a day 7 days a week 365 days a year.Providers - Choose \"CONTINUE HOME MED (no script)\" at discharge if patient treatment with home infusion will continue.   senna-docusate (SENOKOT-S;PERICOLACE) 8.6-50 MG per tablet Take 1-2 tablets by mouth 2 times daily as needed for constipation   order for DME Injection Supplies for Vitamin B12: 3cc syringes w/ 27 gauge needles, 1/2 inch length   [DISCONTINUED] NO ACTIVE MEDICATIONS .            Review of Systems:   Review of Systems   Constitutional: Positive for chills, fatigue and fever.   HENT: Negative for rhinorrhea and sore throat.    Eyes: Negative for pain and visual disturbance.   Respiratory: Positive for cough. Negative for shortness of breath.    Cardiovascular: Positive for leg swelling (occasionally). Negative for chest pain.   Gastrointestinal: Positive for nausea. Negative for abdominal pain, constipation, diarrhea and vomiting.   Genitourinary: Negative for dysuria.   Musculoskeletal: Positive for arthralgias, back pain and myalgias. Negative for neck stiffness.   Skin: Negative for rash.   Neurological: Positive for headaches. Negative for dizziness, weakness, light-headedness and numbness.               Physical Exam:     Vitals were reviewed  Patient Vitals for the past 24 hrs:   BP Temp Temp src Pulse Resp SpO2   09/19/17 2210 129/80 - - 101 - 96 %   09/19/17 2155 123/80 - - - - 96 %   09/19/17 2150 - - - - - 98 %   09/19/17 2140 120/79 - - - - 96 %   09/19/17 " 2135 - - - - - 97 %   09/19/17 2125 117/77 - - - - -   09/19/17 2110 119/80 - - - - -   09/19/17 2100 - - - - - 98 %   09/19/17 2056 127/76 - - - - 98 %   09/19/17 1826 121/76 99.5  F (37.5  C) Oral 96 22 97 %     Physical Exam   Constitutional: He is oriented to person, place, and time. No distress.   HENT:   Head: Normocephalic and atraumatic.   Absence of teeth   Eyes: EOM are normal. Pupils are equal, round, and reactive to light.   Neck: Normal range of motion. No rigidity. Normal range of motion present.   Cardiovascular: Normal rate, regular rhythm and normal heart sounds.    No murmur heard.  Pulmonary/Chest: Effort normal. No respiratory distress. He has no wheezes. He has no rales.   Decreased breath sounds diffusely   Abdominal: Soft. He exhibits no distension. There is no tenderness. There is no guarding.   Musculoskeletal: Normal range of motion.   Neurological: He is alert and oriented to person, place, and time. No cranial nerve deficit.   Skin: Skin is warm. He is not diaphoretic.   Psychiatric: He has a normal mood and affect. His behavior is normal. Judgment and thought content normal.          Data:     Results for orders placed or performed during the hospital encounter of 09/19/17 (from the past 24 hour(s))   CBC with platelets differential   Result Value Ref Range    WBC 4.8 4.0 - 11.0 10e9/L    RBC Count 4.20 (L) 4.4 - 5.9 10e12/L    Hemoglobin 12.3 (L) 13.3 - 17.7 g/dL    Hematocrit 38.4 (L) 40.0 - 53.0 %    MCV 91 78 - 100 fl    MCH 29.3 26.5 - 33.0 pg    MCHC 32.0 31.5 - 36.5 g/dL    RDW 16.6 (H) 10.0 - 15.0 %    Platelet Count 112 (L) 150 - 450 10e9/L    Diff Method Automated Method     % Neutrophils 76.6 %    % Lymphocytes 13.8 %    % Monocytes 8.6 %    % Eosinophils 0.6 %    % Basophils 0.2 %    % Immature Granulocytes 0.2 %    Nucleated RBCs 0 0 /100    Absolute Neutrophil 3.7 1.6 - 8.3 10e9/L    Absolute Lymphocytes 0.7 (L) 0.8 - 5.3 10e9/L    Absolute Monocytes 0.4 0.0 - 1.3 10e9/L     Absolute Eosinophils 0.0 0.0 - 0.7 10e9/L    Absolute Basophils 0.0 0.0 - 0.2 10e9/L    Abs Immature Granulocytes 0.0 0 - 0.4 10e9/L    Absolute Nucleated RBC 0.0    INR   Result Value Ref Range    INR 1.13 0.86 - 1.14   Comprehensive metabolic panel   Result Value Ref Range    Sodium 140 133 - 144 mmol/L    Potassium 3.7 3.4 - 5.3 mmol/L    Chloride 107 94 - 109 mmol/L    Carbon Dioxide 28 20 - 32 mmol/L    Anion Gap 5 3 - 14 mmol/L    Glucose 98 70 - 99 mg/dL    Urea Nitrogen 10 7 - 30 mg/dL    Creatinine 0.68 0.66 - 1.25 mg/dL    GFR Estimate >90 >60 mL/min/1.7m2    GFR Estimate If Black >90 >60 mL/min/1.7m2    Calcium 8.2 (L) 8.5 - 10.1 mg/dL    Bilirubin Total 0.4 0.2 - 1.3 mg/dL    Albumin 3.2 (L) 3.4 - 5.0 g/dL    Protein Total 6.9 6.8 - 8.8 g/dL    Alkaline Phosphatase 122 40 - 150 U/L     (H) 0 - 70 U/L     (H) 0 - 45 U/L   Troponin I   Result Value Ref Range    Troponin I ES <0.015 0.000 - 0.045 ug/L   Blood gas venous   Result Value Ref Range    Ph Venous 7.46 (H) 7.32 - 7.43 pH    PCO2 Venous 39 (L) 40 - 50 mm Hg    PO2 Venous 36 25 - 47 mm Hg    Bicarbonate Venous 28 21 - 28 mmol/L    Base Excess Venous 4.0 mmol/L    FIO2 21    Lactic acid whole blood   Result Value Ref Range    Lactic Acid 1.1 0.7 - 2.0 mmol/L   EKG 12-lead, tracing only   Result Value Ref Range    Interpretation ECG Click View Image link to view waveform and result      *Note: Due to a large number of results and/or encounters for the requested time period, some results have not been displayed. A complete set of results can be found in Results Review.      All cardiac studies reviewed by me.   All imaging studies reviewed by me.        Assessment and Plan:   Assessment:   Parker Acveedo  is a 46 year old  male with significant past medical history of gastric bypass (2002) with subsequent development of short gut syndrome now dependent on TPN (2 years) and history of bacteremia secondary to line  infections, who presents with fevers, chills and generalized malaise found to have positive blood culture for gram positive cocci, admitted for management of bacteremia.   Patient Active Problem List   Diagnosis     Peptic ulcer disease     Gastric bypass status for obesity     CARDIOVASCULAR SCREENING; LDL GOAL LESS THAN 160     Chronic abdominal pain     Ulcer (H)     Vomiting     Anemia     ADHD (attention deficit hyperactivity disorder), inattentive type     Vitamin B12 deficiency without anemia     Thiamine deficiency     Dehydration     Dysphagia     Weight loss, non-intentional     Malnutrition (H)     Chronic anxiety     Constipation     Bile reflux esophagitis     Former smoker     Vitamin D deficiency     Iron deficiency     Health Care Home     Chronic pain     Coagulase negative staph bacteremia associated with intravascular line (H)     Insomnia     Positive blood culture     Fungemia     Chronic nausea     Gastrostomy tube in place (H)     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)     Severe malnutrition (H)     Short gut syndrome     Anxiety     Status post cervical spinal arthrodesis     Port or reservoir infection, initial encounter     Abnormal echocardiogram     Bacteremia     Low serum iron     Anemia, iron deficiency         Plan:   ##fever, chills  ##bacteremia  Fever and chills secondary to bacteremia with likely source being portacath. Hemodynamically stable. Blood cultures drawn 9/19 positive for gram positive cocci both from right arm and portacath. Patient has a history of multiple episodes of bacteremia secondary to infected PICC/port. No leukocytosis, lactic acid normal.     -will treat with Vancomycin. Patient has a history of red man syndrome secondary to vancomycin infusion but responds well to pre-treatment with benadryl and slow infusion. Plan to pre-medicate with benadryl prior to Vancomycin administration  -await final blood culture results  -trend CRP, CBC  -daily blood  cultures  -consider ID consult in AM  -consider removal and replacement of portacath   -mIVF in setting of insensible losses secondary to fever and poor PO hydration    ##cough  ##Possible CAP  Parker reports a history of cough, however this is a chronic intermittent issue likely secondary to history of smoking. CXR revealed an opacity of the left posterior costophrenic angle which may represent infection or atelectasis. Procalcitonin in low risk range. Pneumonia may also be an alternative vs contributing source to fevers. However, most likely source at this time is portacath. Is not requiring supplemental O2, O2 sats have remained stable, no crackles auscultated on exam.   -will hold off on broadening antibiotics to cover for CAP  -continuous pulse ox  -if continuing to experience fever while receiving treatment with vancomycin, consider broadening Abx coverage.     ##elevated LFTs  AST and ALT both elevated. Etiology unclear. No prior history of elevated LFTs per chart review. Denies alcohol use. No hepatitis viral testing per chart review. No RUQ tenderness on exam.   -repeat CMP in AM  -consider further work up for hepatitis pending trend in LFTs    ##h/o gastric bypass  ##short gut syndrome  ##malnutrition  -continue TPN via midline  -continue PTA Vit B12 and Vit D supplements    ##chronic pain syndrome  ##chronic back pain  Follows with [facility]. Last visit 9/18/2017.   -continue PTA oxycodone 10-15mg q4h prn, max 55mg per day  -continue PTA fentanyl 50mcg patch q48hr. Next due change 9/20    ##h/o palpitations, intermittent chest pain  ##h/o decreased LVEF  Echo done 1/16/2016 showed midly reduced LV systolic function (LVEF 45-50%) and mild diffuse hypokinesis. Dobutamine stress echo done 2/13/2017 showed mild cardiomyopathy with good contractile reserve and no inducible ischemia, mild global hypokinesia of left ventricle with LVEF 45-50%. Seen by cardiolology on 7/24/2017 where he was  "started on Coreg 3.125mg BID. Then later seen by Cardiology at [facility] on 8/31/2017 for history of ongoing palpitations. At that time Coreg was increased from 3.125mg BID to 6.25mg BID and a cardiac event monitor was placed for a 30 day monitoring period.  No current chest pain, EKG normal sinus rhythm and troponin negative x1.   -continue PTA coreg 6.25mg BID  -continue PTA cardiac event monitor    ##PUD  -continue PTA carafate     ##Iron Deficiency Anemia  Started outpatient IV iron infusion 9/18. Next due 9/20. Hb stable at 12.3  -continue to monitor hemoglobin with daily CBC  -follow up outpatient for continued IV iron infusion therapy    ##ADHD  -continue PTA adderall    Daily cares -   F:NS at 100cc/hr  E:stable  N:TPN, PO hydration   Lines:midline   Activity:-Up as tolerated  CODE:Full Code  Prophylaxis: mechanical  PCP communication:  - Esteban Daly  - Contacted at admission: No  - Concerns or updates: none  Dispo: Expected discharge date pending clinical improvement, absence of fevers.      Discussed with faculty but not examined by faculty.    Kai Parry MD  Family Medicine PGY2    ADDENDUM: 9/20/17 8:13AM    Physical exam:    Please see full HPI above with the changes in the assessment and plan below.     S: No acute overnight events. Tmax 100.5F. He reports feeling slightly better with less malaise and muscle aches. Denies any change in chronic cough.      O: /51 (BP Location: Left arm)  Pulse 89  Temp 99.4  F (37.4  C) (Oral)  Resp 16  Ht 1.803 m (5' 11\")  Wt 92.6 kg (204 lb 1.6 oz)  SpO2 95%  BMI 28.47 kg/m2  General: alert, no acute distress  Cardiac: RRR, normal heart sounds, no murmurs   Lungs: CTA b/l, no wheezing or crackles   Abd: soft, non-tender, non-distended, no masses palpated  Skin: warm, diaphoretic, no rashes visualized   MSK: no lower extremity edema or tenderness      ## Bacteremia  ## Line infection  - ID consult, appreciate recs   - Continue treatment with " vancomycin with benadryl pretreatment and slow infusion  - Will await further ID input regarding removal of indwelling PICC line and holding TPN    ## Possible CAP  ## Chronic Cough  - Broaden antibiotics if worsening respiratory symptoms or oxygen requirements   - Continuous pulse oximetry     ## Elevated Transaminases: unclear etiology possibly related to TPN vs hepatitis vs biliary pathology. LFT's are trending down.  - RUQ Ultrasound  - Hepatitis B/C viral testing  - Trend with daily CMP    ## Iron Deficiency Anemia  Started outpatient IV iron infusion 9/18 and due for next infusion on 9/20. Hgb stable at 11.9.   - Trend with daily CBC  - Hold iron infusions at this time  - Follow up for outpatient iron infusion therapy       ## Short Gut syndrome  ## Malnutrition   - nutrition consult for continuing TPN through midline   - Will discuss stopping TPN for 24-48 hours with ID  - Continue B12 injections and vitamin D supplementation     ## Decreased LVEF  - continue cardiac monitor   - continue coreg 6.25mg BID  - Follow up outpatient with Cardiology     ## Chronic back pain  He follows with [facility] with the pain regimen below:  -continue PTA oxycodone 10-15mg q4h prn, max 55mg per day  -continue PTA fentanyl 50mcg patch q48hr. Next due change 9/20    Anastasiia Mattson DO  [facility] PGY2

## 2017-09-20 NOTE — PHARMACY-ADMISSION MEDICATION HISTORY
Admission medication history interview status for the 9/19/2017 admission is complete. See Epic admission navigator for allergy information, pharmacy, prior to admission medications and immunization status.     Medication history interview sources:  Patient, Chart Review    Changes made to PTA medication list (reason)  Added: None  Deleted:        1) Senokot (Pt does not use this anymore).        2) Valium (Therapy completed; titrated off).   Changed:        1) Carvedilol (Increased to 6.5mg BID per PCP).     Additional medication history information (including reliability of information, actions taken by pharmacist):  1) Pt uses his EMLA and Morphine cream PRN before access to his port.   2) Pt uses his Mycolog II, Mupirocin around 3 times weekly PRN around his drainage tube if it is red or infected.   3) Oxycodone--  On average, pt uses [id number] for a total of 45 mg daily.   4) Last dose for his cyanocobalamin was >1 month ago around August 5th, so he is due for another dose soon.   5) Pt was prescribed a Z-Bals, did not pick it up and came here instead.     Prior to Admission medications    Medication Sig Last Dose Taking? Auth Provider   Carvedilol (COREG PO) Take 6.25 mg by mouth 2 times daily 9/20/2017 at Unknown time Yes Unknown, Entered By History   mupirocin (BACTROBAN) 2 % ointment Apply topically 3 times daily  Patient taking differently: Apply topically 3 times daily as needed  Past Month at Unknown time Yes Esteban Daly MD   oxyCODONE (ROXICODONE) 5 MG/5ML solution Take 10-15 mLs (10-15 mg) by mouth every 4 hours as needed for moderate to severe pain Max of 55 mg per day. Fill on/after 9/15/17 not to start till 9/17/17. 9/20/2017 at [place] time Yes Patti Milligan MD   fentaNYL (DURAGESIC) 50 mcg/hr 72 hr patch Place 1 patch onto the skin every 48 hours MYLAN BRAND ONLY. Fill on/after 9/15/17 to start on/after 9/17/17 9/20/2017 at 1000 Yes Patti Milligan MD  "  ondansetron (ZOFRAN-ODT) 8 MG ODT tab Take 1 tablet (8 mg) by mouth every 8 hours as needed for nausea 9/20/2017 at Unknown time Yes Esteban Daly MD   sucralfate (CARAFATE) 1 GM/10ML suspension Take 10 mLs (1 g) by mouth 4 times daily 9/20/2017 at Unknown time Yes Esteban Daly MD   vitamin D (ERGOCALCIFEROL) 00963 UNIT capsule Take 1 capsule (50,000 Units) by mouth every 7 days 9/17/2017 at Unknown time Yes Esteban Daly MD   [facility] PAT[facility] Contact [facility] for patient specific medication information at [phone] on admission and discharge from the hospital.  Phones are answered 24 hours a day 7 days a week 365 days a year.    Providers - Choose \"CONTINUE HOME MED (no script)\" at discharge if patient treatment with home infusion will continue. Past Week at Unknown time Yes Fernandez Mann PA-C   nystatin-triamcinolone (MYCOLOG II) cream Apply topically 2 times daily as needed Past Week at Unknown time Yes Esteban Daly MD   [facility] Contact [facility] for patient specific medication information at [phone] on admission and discharge from the hospital.  Phones are answered 24 hours a day 7 days a week 365 days a year.    Providers - Choose \"CONTINUE HOME MED (no script)\" at discharge if patient treatment with home infusion will continue. Past Week at Unknown time Yes Ilsa Shine PA   cyanocobalamin (VITAMIN B12) 1000 MCG/ML injection Inject 1 mL into the muscle every 30 days 8/5/2017 at Unknown time  Unknown, Entered By History   naloxone (NARCAN) nasal spray Spray 1 spray (4 mg) into one nostril alternating nostrils as needed for opioid reversal (every 2-3 minutes until assistance arrives.) Unknown at Unknown time  Patti Milligan MD   cyanocobalamin (VITAMIN B12) 1000 MCG/ML injection Inject 1 mL (1,000 mcg) into the muscle every 30 days Unknown at Unknown time  " "Esteban Daly MD   lidocaine-prilocaine (EMLA) cream Apply topically as needed for moderate pain  Patient taking differently: Apply 1 g topically as needed Applies topically before access to port. Unknown at Unknown time  Esteban Daly MD   amphetamine-dextroamphetamine (ADDERALL) 20 MG per tablet Take 1 tablet (20 mg) by mouth 2 times daily 9/19/2017 at 1000  Esteban Daly MD   amphetamine-dextroamphetamine (ADDERALL) 20 MG per tablet Take 1 tablet (20 mg) by mouth 2 times daily Unknown at Unknown time  Esteban Daly MD   amphetamine-dextroamphetamine (ADDERALL) 20 MG per tablet Take 1 tablet (20 mg) by mouth 2 times daily Unknown at Unknown time  Esteban Daly MD   Needle, Disp, (BD DISP NEEDLES) 27G X 1/2\" MISC 1 Device every 30 days Use for cyanocobalamin injection once q 30 days. Unknown at Unknown time  Anita Méndez PA-C   morphine 0.1% in intrasite topical gel Apply as needed prior to accessing the port site. Unknown at Unknown time  Esteban Daly MD   albuterol (PROAIR HFA/PROVENTIL HFA/VENTOLIN HFA) 108 (90 BASE) MCG/ACT Inhaler Inhale 2 puffs into the lungs every 4 hours as needed for shortness of breath / dyspnea or wheezing More than a month at Unknown time  Esteban Daly MD   order for DME Equipment being ordered: Bilateral knee high chronic venous insufficiency stockings--  mild-moderate pressures. Unknown at Unknown time  Esteban Daly MD   order for DME Injection Supplies for Vitamin B12: 3cc syringes w/ 27 gauge needles, 1/2 inch length Unknown at Unknown time  Anita Méndez PA-C         Medication history completed by: Alo Palumbo, PharmD Candidate    "

## 2017-09-20 NOTE — ED PROVIDER NOTES
[facility] EMERGENCY DEPARTMENT ([facility])    History     Chief Complaint   Patient presents with     Fever     Abnormal Labs     HPI  Parker Acevedo Sr is a 46 year old male with a history of cardiomyopathy, chronic abdominal pain, GERD, hiatal hernia, short gut syndrome, and malnutrition on TPN, s/p Celestino-en-Y gastric bypass and multiple other surgeries, who presents for evaluation of a positive blood culture and fever. The patient was seen in the ED last night for concerns of arthralgias, chills, headache, and fatigue associated with a fever at home to the max of 101.5F. Due to prior history of PowerPort infection, labs were drawn for possible infection and he was discharged home. He was diagnosed with a PNA on CXR and was prescribed azithromycin. Lab results today indicated Gram + cocci growth and he was told to come back to the ED. Today, the patient reports he had a fever of 103F today and continues to experience chills, headache, arthralgias, and fatigue. He also says he continues to experience lower back pain, which he indicated yesterday, as well as a mild productive cough. He denies any bowel or bladder incontinence, weakness in his extremities, abdominal pain, nausea, or vomiting above what he experiences at baseline.    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)      Chronic abdominal pain      Difficulty swallowing      Gastric ulcer, unspecified as acute or chronic, without mention of hemorrhage, perforation, or obstruction      Gastro-oesophageal reflux disease      Head injury      Hiatal hernia      Other bladder disorder      Other chronic pain      Severe malnutrition (H)     TPN     Short gut syndrome      Tobacco abuse        Past Surgical History:   Procedure  Laterality Date     APPENDECTOMY       BACK SURGERY  11/3/2014    curve in the spine     BIOPSY LYMPH NODE CERVICAL N/A 2/20/2015    Procedure: BIOPSY LYMPH NODE CERVICAL;  Surgeon: Baron Scanlon MD;  Location: [facility] OR     C GASTRIC BYPASS,OBESE<100CM SHAYLEE-EN-Y  2002    lost 300 pounds     CHOLECYSTECTOMY       DISCECTOMY, FUSION CERVICAL ANTERIOR ONE LEVEL, COMBINED N/A 2/15/2017    Procedure: COMBINED DISCECTOMY, FUSION CERVICAL ANTERIOR ONE LEVEL;  Surgeon: Darren Campos MD;  Location[facility]     ENDOSCOPIC INSERTION TUBE GASTROSTOMY  9/9/2013    Procedure: ENDOSCOPIC INSERTION TUBE GASTROSTOMY;;  Surgeon: Francis Vyas MD;  [facility]     ENDOSCOPIC ULTRASOUND UPPER GASTROINTESTINAL TRACT (GI)  4/29/2011    Procedure:ENDOSCOPIC ULTRASOUND UPPER GASTROINTESTINAL TRACT (GI); Both Procedures done Conjointly; Surgeon:NEREIDA HOUSER; [facility]     ENDOSCOPIC ULTRASOUND UPPER GASTROINTESTINAL TRACT (GI)  9/9/2013    Procedure: ENDOSCOPIC ULTRASOUND UPPER GASTROINTESTINAL TRACT (GI);  Endoscopic Ultrasound Guide Gastrostomy Tube Placement  C-arm;  Surgeon: Noe Lizarraga MD;  [facility]     ENDOSCOPY  03/25/11    EGD, [facility]     ENDOSCOPY  08/04/09    Upper Endoscopy, [facility]     ENDOSCOPY  01/05/09    Upper Endoscopy, [facility]     ESOPHAGOSCOPY, GASTROSCOPY, DUODENOSCOPY (EGD), COMBINED  4/20/2011    Procedure:COMBINED ESOPHAGOSCOPY, GASTROSCOPY, DUODENOSCOPY (EGD); Surgeon:FRANCIS VYAS; [facility]     ESOPHAGOSCOPY, GASTROSCOPY, DUODENOSCOPY (EGD), COMBINED  6/15/2011    Procedure:COMBINED ESOPHAGOSCOPY, GASTROSCOPY, DUODENOSCOPY (EGD); Surgeon:FRANCIS VYAS; [facility]     ESOPHAGOSCOPY, GASTROSCOPY, DUODENOSCOPY (EGD), COMBINED  6/12/2013    Procedure: COMBINED ESOPHAGOSCOPY, GASTROSCOPY, DUODENOSCOPY (EGD);;  Surgeon: Francis Vyas MD;  [facility]     ESOPHAGOSCOPY, GASTROSCOPY, DUODENOSCOPY (EGD), COMBINED   11/22/2013    Procedure: COMBINED ESOPHAGOSCOPY, GASTROSCOPY, DUODENOSCOPY (EGD);;  Surgeon: Francis Vyas MD;  [facility]     ESOPHAGOSCOPY, GASTROSCOPY, DUODENOSCOPY (EGD), COMBINED  4/30/2014    Procedure: COMBINED ESOPHAGOSCOPY, GASTROSCOPY, DUODENOSCOPY (EGD);  Surgeon: Francis Vyas MD;  [facility]     ESOPHAGOSCOPY, GASTROSCOPY, DUODENOSCOPY (EGD), COMBINED N/A 2/20/2015    Procedure: COMBINED ESOPHAGOSCOPY, GASTROSCOPY, DUODENOSCOPY (EGD), BIOPSY SINGLE OR MULTIPLE;  Surgeon: Baron Scanlon MD;  [facility]     ESOPHAGOSCOPY, GASTROSCOPY, DUODENOSCOPY (EGD), COMBINED N/A 9/30/2015    Procedure: COMBINED ESOPHAGOSCOPY, GASTROSCOPY, DUODENOSCOPY (EGD);  Surgeon: Francis Vyas MD;  [facility]     GASTRECTOMY  6/22/2012    Procedure: GASTRECTOMY;  Open Approach, Excise Ulcers,Partial Gastrectomy, Esophagojejunostomy, Hiatal Hernia Repair, Extensive Lysis of Adhesions and Esaphagogastrodudenoscopy.;  Surgeon: Francis Vyas MD;  [facility]     GASTROJEJUNOSTOMY  08/26/09    Extensice enterolysis, partial resect. jejunum, part. resect gastric pouch, gastrojejunostomy anastomosis     HC ESOPH/GAS REFLUX TEST W NASAL IMPED ELECTRODE  8/5/2013    Procedure: ESOPHAGEAL IMPEDENCE FUNCTION TEST 1 HOUR OR LESS;  Surgeon: Halie Lang MD;  Location:  GI     HEAD & NECK SURGERY  2/15/2017    C5-C6     HERNIA REPAIR  2006    Umbilical hernia     HERNIORRHAPHY HIATAL  6/22/2012    Procedure: HERNIORRHAPHY HIATAL;;  Surgeon: Francis Vyas MD;  [facility]     HERNIORRHAPHY INGUINAL  11/22/2013    Procedure: HERNIORRHAPHY INGUINAL;;  Surgeon: Francis Vyas MD;  [facility]     LAPAROTOMY EXPLORATORY  11/22/2013    Procedure: LAPAROTOMY EXPLORATORY;  Exploratory Laparotomy, Upper Endoscopy, Left Inguinal Hernia Repair;  Surgeon: Francis Vyas MD;  [facility]     PICC INSERTION Right 03/16/2017    5fr DL BioFlo PICC, 42cm  (3cm external) in the R medial brachial vein w/ tip in the SVC RA junction.     PICC INSERTION Left 09/23/2017    5fr DL BioFlo PICC, 45cm (1cm external) in the L basilic vein w/ tip in the SVC RA junction.     SHAYLEE EN Y BOWEL  2003     SOFT TISSUE SURGERY       SOFT TISSUE SURGERY       TONSILLECTOMY         Family History   Problem Relation Age of Onset     GASTROINTESTINAL DISEASE Mother      Crohns disease     Anxiety Disorder Mother      Thyroid Disease Mother      Grave's disease     CANCER Father      ear cancer-skin cancer/melanoma     Breast Cancer Maternal Grandmother      DIABETES Maternal Uncle      Breast Cancer Other      Hypertension No family hx of      Hyperlipidemia No family hx of      CEREBROVASCULAR DISEASE No family hx of      Prostate Cancer No family hx of      Depression No family hx of      Anesthesia Reaction No family hx of      Asthma No family hx of      OSTEOPOROSIS No family hx of      Genetic Disorder No family hx of      Obesity No family hx of      MENTAL ILLNESS No family hx of      Substance Abuse No family hx of        Social History   Substance Use Topics     Smoking status: Light Tobacco Smoker     Packs/day: 0.10     Years: 3.00     Types: Cigarettes     Smokeless tobacco: Current User      Comment: I use an e cig every now and than     Alcohol use No      Comment: quit        No current facility-administered medications for this encounter.      Current Outpatient Prescriptions   Medication     vancomycin (VANCOCIN) 1750 mg in 0.9% NaCl 500 mL PREMIX     Carvedilol (COREG PO)     mupirocin (BACTROBAN) 2 % ointment     oxyCODONE (ROXICODONE) 5 MG/5ML solution     fentaNYL (DURAGESIC) 50 mcg/hr 72 hr patch     ondansetron (ZOFRAN-ODT) 8 MG ODT tab     sucralfate (CARAFATE) 1 GM/10ML suspension     vitamin D (ERGOCALCIFEROL) 73565 UNIT capsule     [facility]     nystatin-triamcinolone (MYCOLOG II) cream     [facility]      "cyanocobalamin (VITAMIN B12) 1000 MCG/ML injection     naloxone (NARCAN) nasal spray     cyanocobalamin (VITAMIN B12) 1000 MCG/ML injection     lidocaine-prilocaine (EMLA) cream     amphetamine-dextroamphetamine (ADDERALL) 20 MG per tablet     amphetamine-dextroamphetamine (ADDERALL) 20 MG per tablet     amphetamine-dextroamphetamine (ADDERALL) 20 MG per tablet     Needle, Disp, (BD DISP NEEDLES) 27G X 1/2\" MISC     morphine 0.1% in intrasite topical gel     albuterol (PROAIR HFA/PROVENTIL HFA/VENTOLIN HFA) 108 (90 BASE) MCG/ACT Inhaler     order for DME     order for DME     [DISCONTINUED] NO ACTIVE MEDICATIONS        Allergies   Allergen Reactions     Bactrim [Sulfamethoxazole W/Trimethoprim] Rash     Penicillins Anaphylaxis     Doxycycline Rash     Vancomycin Rash     Rash after receiving vancomycin 3/28/16 (red man's?). Tolerated with slower infusion and diphenhydramine premed.       I have reviewed the Medications, Allergies, Past Medical and Surgical History, and Social History in the Epic system.    Review of Systems   Constitutional: Positive for chills, diaphoresis, fatigue and fever.   Respiratory: Positive for cough.    Gastrointestinal: Negative for abdominal pain, nausea and vomiting.   Musculoskeletal: Positive for arthralgias and back pain (lower and bilateral).   Neurological: Positive for headaches. Negative for weakness.   All other systems reviewed and are negative.    Physical Exam   BP: 121/76  Pulse: 96  Temp: 99.5  F (37.5  C)  Resp: 22  SpO2: 97 %  Physical Exam    GENERAL: Chronically ill-appearing, no acute distress.  HENT:    Head: Normocephalic. Atraumatic.   Ears: External ears normal, hearing grossly intact.   Nose: No external deformities.   Throat/Mouth: Moist oral mucosa. Posterior oropharynx is clear.  EYES: Pupils equal, round, reactive. No conjunctival pallor, sclerae clear. EOMI.  CV: Regular rate, regular rhythm. Warm and well perfused.  PULMONARY: Effort normal. No accessory " muscle use. Good air movement. No stridor. Lung sounds clear bilaterally with no wheezing, rhonchi, or rales.  GI: Soft, non-distended, non-tender to palpation.  NEURO: Alert, awake. Oriented x3. No focal sensory loss or muscular weakness. No facial drooping, no slurring of speech.  MSK:    Neck: Supple.   Extremities: No gross deformity. Coordinated movement of all extremities.  INTEGUMENTARY: Warm. No diaphoresis. No rashes, jaundice, or ecchymoses.  PSYCH: Appropriate affect. Behavior is normal. Linear thought process. Normal insight.    ED Course     Procedures    Critical Care time:  none  Labs Ordered and Resulted from Time of ED Arrival Up to the Time of Departure from the ED   CBC WITH PLATELETS DIFFERENTIAL - Abnormal; Notable for the following:        Result Value    RBC Count 4.20 (*)     Hemoglobin 12.3 (*)     Hematocrit 38.4 (*)     RDW 16.6 (*)     Platelet Count 112 (*)     Absolute Lymphocytes 0.7 (*)     All other components within normal limits   COMPREHENSIVE METABOLIC PANEL - Abnormal; Notable for the following:     Calcium 8.2 (*)     Albumin 3.2 (*)      (*)      (*)     All other components within normal limits   BLOOD GAS VENOUS - Abnormal; Notable for the following:     Ph Venous 7.46 (*)     PCO2 Venous 39 (*)     All other components within normal limits   INR   TROPONIN I   LACTIC ACID WHOLE BLOOD   PROCALCITONIN     Assessments & Plan (with Medical Decision Making)   Primary Evaluation:  Patient was seen and examined in the Emergency Department and was noted to be in no acute distress. Initial vital signs demonstrated no abnormalities. Airway was patent and protected. Breathing was intact. Hemodynamics were stable.    Patient's history was reviewed in the chart and with the patient.    MDM and Disposition:  Patient presented for evaluation of possible bacteremia. He was in no acute distress and hemodynamically stable on arrival. Repeat set of labs were drawn, including 2  sets of cultures, one of them off of the PowerPort. Additional IV access was obtained and the PowerPort was not use subsequently. Lactate was normal, procalcitonin was not elevated. CRP was elevated to 57. No leukocytosis. He did have a mild transaminitis (, ). UA was clean. The patient was treated empirically with vancomycin while awaiting speciation of the cultures. Low suspicion for overt sepsis at this time.    Given the patient's clinical history, physical exam, and results of our diagnostic work-up, the decision was made to admit the patient to the hospital in stable condition for further evaluation and treatment of possible bacteremia. This was discussed with the patient and all who were present were in agreement with the plan. Report was called to the admitting team who accepted the patient. The patient will be admitted to a general medical floor under the internal medicine service.    Patient remained hemodynamically stable throughout his stay in the Emergency Department.    Final Clinical Impression:  Bacteremia    I have reviewed the nursing notes.  I have reviewed the findings, diagnosis, plan and need for follow up with the patient.  Guy Guerin DO  Emergency Medicine  Pager: [phone]  Discharge Medication List as of 9/23/2017  3:19 PM      START taking these medications    Details   vancomycin (VANCOCIN) 1750 mg in 0.9% NaCl 500 mL PREMIX Inject 500 mLs (1,750 mg) into the vein every 8 hours for 14 days, Disp-29629 mL, R-0, Local Print           Final diagnoses:   Bacteremia     IAlfredo, am serving as a trained medical scribe to document services personally performed by Chico Guerin MD, based on the provider's statements to me.      [place] DO, was physically present and have reviewed and verified the accuracy of this note documented by Alfredo Duarte.     9/19/2017   [facility], EMERGENCY DEPARTMENT     Guy Guerin,  MD  10/02/17 1418

## 2017-09-20 NOTE — PHARMACY-VANCOMYCIN DOSING SERVICE
Pharmacy Vancomycin Initial Note  Date of Service 2017  Patient's  1970  46 year old, male    Indication: Bacteremia    Current estimated CrCl = Estimated Creatinine Clearance: 150.9 mL/min (based on Cr of 0.72).    Creatinine for last 3 days  2017: 12:37 AM Creatinine 0.72 mg/dL    Recent Vancomycin Level(s) for last 3 days  No results found for requested labs within last 72 hours.      Vancomycin IV Administrations (past 72 hours)      No vancomycin orders with administrations in past 72 hours.                Nephrotoxins and other renal medications (Future)    Start     Dose/Rate Route Frequency Ordered Stop    17 0800  vancomycin (VANCOCIN) 1,750 mg in NaCl 0.9 % 500 mL intermittent infusion      1,750 mg Intravenous EVERY 12 HOURS 17 [id number]  vancomycin (VANCOCIN) 1,750 mg in NaCl 0.9 % 500 mL intermittent infusion      1,750 mg  over 120 Minutes Intravenous ONCE 17            Contrast Orders - past 72 hours     None                Plan:  1.  Start vancomycin  1750 mg IV q12h.   2.  Goal Trough Level: 15-20 mg/L   3.  Pharmacy will check trough levels as appropriate in 1-3 Days.    4. Serum creatinine levels will be ordered daily for the first week of therapy and at least twice weekly for subsequent weeks.    5. [place] method utilized to dose vancomycin therapy: Method 2    Astrid Rubio, PharmD

## 2017-09-20 NOTE — DOWNTIME EVENT NOTE
The EMR was down for 4 hours on 9/20/2017.    Astrid Calloway RN was responsible for completing the paper charting during this time period.     The following information was re-entered into the system by Astrid Calloway: Flowsheet data, Intake and output and MAR    The following information will remain in the paper chart: N/a    Astrid Calloway  9/20/2017

## 2017-09-20 NOTE — CONSULTS
[facility]  General Infectious Disease Initial Consult Note     Patient:  Parker Acevedo Sr, Date of birth 1970, Medical record number [id number]  Date of Visit:  September 20, 2017  Consult Requested by: Dr. Mattson  Reason for Consult: Gram positive cocci bacteremia         Assessment and Recommendations:   Problem List:  Strep salivarius bacteremia  Transaminitis  Indwelling port  Celestino en y gastric bypass, short gut syndrome, on chronic TPN  History of bacteremia secondary to infected port/PICC    Impression: Parker Acevedo is a 46 year old male with a history of celestino-en-y gastric bypass complicated by short gut syndrome now dependant on chronic TPN, multiple episodes of bacteremia secondary to infected PICC/port, PUD, anemia, and ADHD who was admitted on 9/19 with fevers, chills and strep salivarius bacteremia.     Strep bacteremia: Bacteria growing from the port and peripherally. Source may be from the port, which he accesses for TPN. Source might also include endocarditis. Sensitivities pending. Currently on Vancomycin. This will likely be narrowed once we know sensitivities of the strep. Would hold off on using the port for TPN until we clear the bacteremia. It is reasonable to consider retaining the port at this point. Will await final sensitivities, and then will likely advise for antibiotic locks for the port itself.     Recommendations:  1. Continue Vancomycin for now  2. Follow up on pending blood cultures  3. Would hold off on using the port for now  4. Please obtain TTE to evaluate for endocarditis  5. Please trend LFTs    Thanks for the consult. ID will continue to follow.       Attestation:  I have reviewed today's vital signs, medications, labs and imaging.  Mikaela Ma MD  Pager [phone]         History of Present Illness:       Parker Acevedo is a 46 year old male with a history of celestino-en-y gastric bypass complicated by short gut syndrome now  dependant on chronic TPN, multiple episodes of bacteremia secondary to infected PICC/port, PUD, anemia, and ADHD who was admitted on 9/19 with fevers, chills and positive blood culture. Parker was seen early morning of 9/19 for fever and chills with general malaise. Work up was remarkable for questionable pneumonia on CXR and Parker was sent home with azithromycin but did not take any of the antibiotic. Blood cultures were also collected and a preliminary read later in the evening was positive for gram positive cocci from both a peripheral site in right arm and portacath. Parker was then notified and instructed to return to ED for treatment.      Parker reports 3 to 4 days of fevers, chills, rigors, and sweats. Reports Tmax of 101F measured at home on 9/18. Tmax on 9/19 of 103F. This began acutely, and he was in his usual state of health 4 days prior.  Associated symptoms include generalized body aches and joint pain, fatigue, headaches. Denies neck stiffness or rigidity, vision changes, eye pain, vomiting, weakness or numbness/tingling. No change in mental status per wife. He hasn't had any problems with his port. He received TPN via the port, without difficulty.     In June 2017 he grew strep salviarium and staph epidermidis from his line.   In October 2016 he grew staph epidermidis from his line           Review of Systems:   CONSTITUTIONAL:  + fevers or chills  EYES: negative for icterus  ENT:  negative for oral lesions, hearing loss, tinnitus and sore throat  RESPIRATORY:  negative for cough and dyspnea  CARDIOVASCULAR:  negative for chest pain, palpitations  GASTROINTESTINAL:  negative for nausea, vomiting, diarrhea and constipation  GENITOURINARY:  negative for dysuria  HEME:  No easy bruising/bleeding  INTEGUMENT:  negative for rash and pruritus  NEURO:  Negative for headache       Past Medical History:     Past Medical History:   Diagnosis Date     ADHD (attention deficit hyperactivity disorder)      Anxiety       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)     mild EF ~45% on rest 2/13/17, improves with stressing     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)      Chronic abdominal pain      Difficulty swallowing      Gastric ulcer, unspecified as acute or chronic, without mention of hemorrhage, perforation, or obstruction      Gastro-oesophageal reflux disease      Head injury      Hiatal hernia      Other bladder disorder      Other chronic pain      Severe malnutrition (H)     TPN     Short gut syndrome      Tobacco abuse          Allergies:      Allergies   Allergen Reactions     Bactrim [Sulfamethoxazole W/Trimethoprim] Rash     Penicillins Anaphylaxis     Doxycycline Rash     Vancomycin Rash     Rash after receiving vancomycin 3/28/16 (red man's?). Tolerated with slower infusion and diphenhydramine premed.            Current Antimicrobials:     Vancomycin  D1=9/19       Family History:     Family History   Problem Relation Age of Onset     GASTROINTESTINAL DISEASE Mother      Crohns disease     Anxiety Disorder Mother      Thyroid Disease Mother      Grave's disease     CANCER Father      ear cancer-skin cancer/melanoma     Breast Cancer Maternal Grandmother      DIABETES Maternal Uncle      Breast Cancer Other      Hypertension No family hx of      Hyperlipidemia No family hx of      CEREBROVASCULAR DISEASE No family hx of      Prostate Cancer No family hx of      Depression No family hx of      Anesthesia Reaction No family hx of      Asthma No family hx of      OSTEOPOROSIS No family hx of      Genetic Disorder No family hx of      Obesity No family hx of      MENTAL ILLNESS No family hx of      Substance Abuse No family hx of             Social History:     Social History     Social History     Marital status:      Spouse name: Rose     Number of children: 1     Years of education: N/A     Occupational History     Not on file.     Social History Main Topics     Smoking status: Light Tobacco Smoker      Packs/day: 0.10     Years: 3.00     Types: Cigarettes     Smokeless tobacco: Current User      Comment: I use an e cig every now and than     Alcohol use No      Comment: quit      Drug use: No     Sexual activity: Yes     Partners: Female     Birth control/ protection: None      Comment: no protection     Other Topics Concern     Parent/Sibling W/ Cabg, Mi Or Angioplasty Before 65f 55m? No     Social History Narrative              Physical Exam:   Ranges forvital signs:  Temp:  [99.4  F (37.4  C)-100.5  F (38.1  C)] 99.4  F (37.4  C)  Pulse:  [] 89  Resp:  [16-22] 16  BP: (108-142)/(51-80) 108/51  SpO2:  [95 %-98 %] 95 %    Intake/Output Summary (Last 24 hours) at 09/20/17 0910  Last data filed at 09/20/17 0700   Gross per 24 hour   Intake              555 ml   Output                0 ml   Net              555 ml       Exam:  GENERAL:  well-developed, well-nourished, in no acute distress.   ENT:  Head is normocephalic, atraumatic. Oropharynx is moist without exudates or ulcers.  EYES:  Eyes have anicteric sclerae.  PERRL.  NECK:  Supple. No cervical lymphadenopathy.   LUNGS:  Clear to auscultation bilaterally. No wheezes or crackles  CARDIOVASCULAR:  Regular rate and rhythm with no murmurs, gallops or rubs.  ABDOMEN:  Normal bowel sounds, soft, nontender. No hepatosplenomegaly.   EXT: Extremities warm and without edema. No peripheral stigmata of endocarditis.   SKIN:  No acute rashes.    NEUROLOGIC:  Grossly nonfocal.    ACCESS: Port over R chest -some chronic erythema present around it. No drainage. R midline in place, no surrounding erythema.        Laboratory Data:     Creatinine   Date Value Ref Range Status   09/20/2017 0.67 0.66 - 1.25 mg/dL Final   09/19/2017 0.68 0.66 - 1.25 mg/dL Final   09/19/2017 0.72 0.66 - 1.25 mg/dL Final   09/15/2017 0.66 0.66 - 1.25 mg/dL Final   08/13/2017 0.71 0.66 - 1.25 mg/dL Final     WBC   Date Value Ref Range Status   09/20/2017 5.3 4.0 - 11.0 10e9/L Final   09/19/2017  4.8 4.0 - 11.0 10e9/L Final   2017 8.5 4.0 - 11.0 10e9/L Final   09/15/2017 8.0 4.0 - 11.0 10e9/L Final   2017 6.7 4.0 - 11.0 10e9/L Final     Hemoglobin   Date Value Ref Range Status   2017 11.9 (L) 13.3 - 17.7 g/dL Final     Platelet Count   Date Value Ref Range Status   2017 104 (L) 150 - 450 10e9/L Final     Lab Results   Component Value Date     2017    BUN 8 2017    CO2 27 2017     ,   Tbili 0.5  Culture data:  All cultures:    Recent Labs  Lab 17  0556 17  0549 17  1929 17  0038 17  0037   CULT No growth after 1 hour No growth after 1 hour No growth after 9 hours Cultured on the 2nd day of incubation:Gram positive cocci in pairs and chains*  Critical Value/Significant Value, preliminary result only, called to and read back byAnnel Hirsch RN at 0814 on 17.KD  Cultured on the 1st day of incubation:Gram positive cocci*  Critical Value/Significant Value, preliminary result only, called to and read back byDr. Jimenez, from [facility] 17 at 1357. GR. Cultured on the 1st day of incubation:Gram positive cocci*  Critical Value/Significant Value, preliminary result only, called to and read back byDr. Jimenez from [facility] 17 at 1041. GR.  (Note)NEGATIVE for the following: Staphylococcus spp., Staph aureus, Staphepidermidis, Staph lugdunensis, Streptococcus spp., Strep pneumoniae,Strep pyogenes, Strep agalactiae, Strep anginosus group, Enterococcusfaecalis, Enterococcus faecium, and Listeria spp. by Verigenemultiplex nucleic acid test. Final identification and antimicrobialsusceptibility testing will be verified by standard methods.Critical Value/Significant Value called to and read back by  from [facility]. 09.19.17 at 1357. GR.        Hep B and C serologies pending       Imagin/19 CXR  Opacity of the left posterior costophrenic angle seen on  the lateral projection not well seen on the  AP view, may represent  infection or atelectasis.

## 2017-09-20 NOTE — PLAN OF CARE
Problem: Goal Outcome Summary  Goal: Goal Outcome Summary  Outcome: No Change  VSS on RA, except for low grade temp- max 100.5. C/o pain in abdomen. Oxycodone given x2 w/ some relief. Also reported nausea, which was helped with zofran. Up ad vitor walking in halls. Midline intact and infusing MIVF. Sleeping between cares. Voiding adequately, but no BM this shift. Port still in chest- plan to figure out what to do about that today. TPN to start today after nutrition consult. Will continue to monitor.

## 2017-09-20 NOTE — SUMMARY OF CARE
Pt arrived with the following belongings: shoes, cane, clothing (shirt, pants, pajama pants), cellphone, 2 rings, bracelet, watch, and wallet. All belongings are being kept with patient at the bedside.

## 2017-09-20 NOTE — PROGRESS NOTES
CLINICAL NUTRITION SERVICES - ASSESSMENT NOTE     Nutrition Prescription    RECOMMENDATIONS FOR MDs/PROVIDERS TO ORDER:  Once able to resume Parenteral Access, MD to send consult for Pharmacy/Nutrition to Start and Manage PN      Malnutrition Status:    Non-severe malnutrition in the context of acute on chronic illness    Recommendations already ordered by Registered Dietitian (RD):  - Sent PPN pharmacy change order for the followings:   -  Clinimix, @ Continuous rate @ 80 ml/hr, ( 1920 ml volume) + 250 ml daily lipids.  -  Dosing Weight: 76 kg home infusion TPN dosing wt.     - Regimen D5%, (96 grams dextrose), AA 4.25%, (82 grams AA) and 250 ml lipids daily. - Provides: 1153 kcals/day (15 kcal/kg),  1.1 gm PRO/kg, 96 gm CHO/day, ( GIR: 0.9 mg/kg/min) 43% kcals from Fat.     Future/Additional Recommendations:  1. Once able to resume parenteral access, Rec begin PN/lipids with the following formulation/ Modified home regimen to better meet patient's needs;   - PN Cycled over night 12 hours @ (1920 mL total daily volume ), based on 76 kg TPN home dosing weight.   - Dextrose 235 grams/day,  grams/day and 20% Lipids 250 ml daily.   2. Pt is at risk for refeeding syndrome. May start with 150 gm dextrose /day ( GIR: 1.3 mg/kg/min). Monitor Lytes. If Mg++/K+ / Phos within normal range, may continue to advance Dextrose by 28 gm's daily to final goal of 235 gm dextrose/day per home infusion.   3. New TPN Regimen provides: 1779 kcal's /day  (23 kcals/kg), 120 gm protein /day  (1.6 gm/kg), 235 gm dextrose/day ( GIR: 5.2 mg/kg/min per peak cycle infusion) with 28% lipids daily (0.7 gm/kg).   4.  Modules: Infuvite 10 ml/day, M.T.E. -5: 3 mls daily.          REASON FOR ASSESSMENT  Parker Escobar Curtis Elizabeth is a/an 46 year old male assessed by the dietitian for Pharmacy/Nutrition to Start and Manage PN - Midline Catheter - requires peripheral formula  Indication: Short gut syndrome     Chart reviewed:   - PT with significant  past medical history of celestino-en-y gastric bypass in 2002, complicated by short gut syndrome now dependant on chronic TPN since July 2015.    - History of bacteremia secondary to line infections  - Presents with fevers, chills and positive blood culture. Bacteremia, Per MD note, likely source Portacath. Blood cultures drawn 9/19 positive for gram positive cocci both from right arm and Portacath.     Surgical history:  2002: C gastric bypass, obese<100 cm celestino-en-Y, Lost 300 lbs.   2003 Celestino-en-Y bowel ??  RNY Gastric bypass ( 2010) ??    6/22/12: GASTRECTOMY;  Open Approach, Excise Ulcers,Partial Gastrectomy, Esophagojejunostomy, Hiatal Hernia Repair, Extensive Lysis of Adhesions  8/26/09: GASTROJEJUNOSTOMY - Extensive enterolysis, partial resect. jejunum, part. Resect gastric pouch, gastrojejunostomy anastomosis.  6/22/12- Partial gastrectomy - Excise Ulcers, Partial Gastrectomy, Esophagojejunostomy, Hiatal Hernia Repair, Extensive Lysis of Adhesions and Esophagogastroduodenoscopy.;        NUTRITION HISTORY  Per chart review, pt with past medical history of RNY gastric bypass (2010) s/p multiple surgery (8-12 times) resulted in Short gut syndrome, On Chronic TPN since July 2015.     - Pt reports that he has a J-tube (2013) for decompression. Has no stomach, uses his venting J-tube for bile excretion. Per patient, has been aspirating on bile when asleep at night. Gets severe burning and indigestion from bile acid.      PO: Able to eat very limited quantity due to Nausea, severe hiccup, burning of esophagus / intestine. Patient reports inability to tolerate much of liquids and solids due to GI distension and burning.      Home TPN regimen (Obtained from [facility]):   - Cycled overnight x 12 hours, 2300 mL PN volume, 76 kg TPN dosing wt:   - Dextrose: 235 gm /day ,  gm/day  + 350 mL lipids x 5 nights per week ( Average 250 ml daily ),     - Modules: Infuvite 10 ml/day, M.T.E. -5: 3 mls daily.      - Home TPN Regimen  "provides:  1739 kcals (23 kcals/kg), 110 gm protein (1.4 gm/kg),  29% lipids daily (0.7 gm/kg), per 76 kg TPN dosing weight.     - Patient reports volume wt gain with TPN. Sometimes gets swollen ankles during PN infusion. Patient in agreement with a trial of reducing PN volume.    CURRENT NUTRITION ORDERS  Diet: Regular ( takes bites for pleasure, pending ability to tolerate )  TPN: on hold, Bridging with PPN until infection improves.     LABS  BUN/Cr: 8/0.67 - within normal range renal function  LFT's: Alk phos: 192 ( H), ALT: 192 ( H), AST: 135 (H), T ( on 9/15, within normal range).   CRP: 82 ( elevated), Prealbumin: 23 ( 9/15), Albumin: 3.0 (L)  Lytes as of : K+: 3.4,  Mg++:1.8 - within normal range  Phos: 2.2 (L) on 9/15 -       MEDICATIONS  IV Iron infusion therapy outpatient , next due on    B12 injection 1000 mcg every 30 days  Carafate suspension 1 gm ( 4 times daily ) Oral   Vitamin D ( Ergocalciferol) capsule 50,000 units every 7 days Oral      ANTHROPOMETRICS  Height: 180.3 cm (5' 11\")  Most Recent Weight: 89.1 kg (196 lb 6.4 oz) - wt from  ( likely drier wt )   IBW: 78 kg ( 114% IBW)  BMI: 27.42 kg /m2 - Overweight BMI 25-29.9    Weight History: Patient reports wt increase. Unsure of his dry wt.   Wt Readings from Last 10 Encounters:   17 92.6 kg (204 lb 1.6 oz)   17 90.7 kg (200 lb)   17 89.1 kg (196 lb 6.4 oz)   17 79.4 kg (175 lb 1.6 oz)   17 78.9 kg (174 lb)   17 88.9 kg (195 lb 14.4 oz)   17 89.4 kg (197 lb)   17 91.9 kg (202 lb 8 oz)   17 89.2 kg (196 lb 11.2 oz)   17 79.1 kg (174 lb 4.8 oz)       Dosing Weight: 89 kg wt on  likely dry wt and IBW of 78 kg, ( TPN dosing wt: 75.7 kg ).     ASSESSED NUTRITION NEEDS  Estimated Energy Needs: 1780 - 2225  kcals/day (20 - 25 kcals/kg)  Justification: Maintenance under TPN  Estimated Protein Needs: 107 - 134 grams protein/day (1.2 - 1.5 grams of pro/kg)  Justification: " Increased needs  Estimated Fluid Needs: (1 mL/kcal)   Justification: Maintenance    PHYSICAL FINDINGS  See malnutrition section below.    MALNUTRITION  % Intake: On home chronic TPN, meeting estimated needs.   % Weight Loss: None noted  Subcutaneous Fat Loss: Facial region:  Mild   Muscle Loss: Temporal:  Mild and Facial & jaw region: Mild  Fluid Accumulation/Edema: None noted  Malnutrition Diagnosis: Non-severe malnutrition in the context of acute on chronic illness    NUTRITION DIAGNOSIS  Altered GI function related to multiple abdominal surgeries with resultant short gut syndrome as evidenced by reliant on TPN since July 2015 to provide for full nutrition needs     INTERVENTIONS  Implementation  Nutrition Education: Role of RD in nutrition POC  / TPN + PPN start   Parenteral Nutrition/IV Fluids - Recs above   Collaboration with other providers - Pharmacist       Goals  Total average nutritional intake to meet a minimum of 20 kcal/kg and 1.2 gm PRO/kg daily (per dosing wt 89 kg drier wt from 9/13 ).       Monitoring/Evaluation  Progress toward goals will be monitored and evaluated per protocol.    Gautam Mckeon RD/BLANKA  Pager [phone]

## 2017-09-20 NOTE — PLAN OF CARE
Problem: Goal Outcome Summary  Goal: Goal Outcome Summary  Patient up indpendent. Patient port is infected growing positive blood cx. Patient on tpn has a midline in place. Patient had ultra sound. P:cont to monitor

## 2017-09-21 ENCOUNTER — APPOINTMENT (OUTPATIENT)
Dept: CARDIOLOGY | Facility: CLINIC | Age: 47
DRG: 270 | End: 2017-09-21
Attending: FAMILY MEDICINE
Payer: COMMERCIAL

## 2017-09-21 LAB
ALBUMIN SERPL-MCNC: 2.9 G/DL (ref 3.4–5)
ALP SERPL-CCNC: 94 U/L (ref 40–150)
ALT SERPL W P-5'-P-CCNC: 122 U/L (ref 0–70)
ANION GAP SERPL CALCULATED.3IONS-SCNC: 4 MMOL/L (ref 3–14)
AST SERPL W P-5'-P-CCNC: 56 U/L (ref 0–45)
BASOPHILS # BLD AUTO: 0 10E9/L (ref 0–0.2)
BASOPHILS NFR BLD AUTO: 0.4 %
BILIRUB SERPL-MCNC: 0.4 MG/DL (ref 0.2–1.3)
BUN SERPL-MCNC: 6 MG/DL (ref 7–30)
CALCIUM SERPL-MCNC: 7.8 MG/DL (ref 8.5–10.1)
CHLORIDE SERPL-SCNC: 110 MMOL/L (ref 94–109)
CO2 SERPL-SCNC: 26 MMOL/L (ref 20–32)
CREAT SERPL-MCNC: 0.72 MG/DL (ref 0.66–1.25)
CRP SERPL-MCNC: 88 MG/L (ref 0–8)
DIFFERENTIAL METHOD BLD: ABNORMAL
EOSINOPHIL # BLD AUTO: 0.3 10E9/L (ref 0–0.7)
EOSINOPHIL NFR BLD AUTO: 5.4 %
ERYTHROCYTE [DISTWIDTH] IN BLOOD BY AUTOMATED COUNT: 16.8 % (ref 10–15)
GFR SERPL CREATININE-BSD FRML MDRD: >90 ML/MIN/1.7M2
GLUCOSE BLDC GLUCOMTR-MCNC: 93 MG/DL (ref 70–99)
GLUCOSE SERPL-MCNC: 96 MG/DL (ref 70–99)
HCT VFR BLD AUTO: 36.1 % (ref 40–53)
HGB BLD-MCNC: 11.3 G/DL (ref 13.3–17.7)
IMM GRANULOCYTES # BLD: 0 10E9/L (ref 0–0.4)
IMM GRANULOCYTES NFR BLD: 0.2 %
LYMPHOCYTES # BLD AUTO: 1.7 10E9/L (ref 0.8–5.3)
LYMPHOCYTES NFR BLD AUTO: 32.4 %
MCH RBC QN AUTO: 28.8 PG (ref 26.5–33)
MCHC RBC AUTO-ENTMCNC: 31.3 G/DL (ref 31.5–36.5)
MCV RBC AUTO: 92 FL (ref 78–100)
MONOCYTES # BLD AUTO: 0.9 10E9/L (ref 0–1.3)
MONOCYTES NFR BLD AUTO: 16.9 %
NEUTROPHILS # BLD AUTO: 2.3 10E9/L (ref 1.6–8.3)
NEUTROPHILS NFR BLD AUTO: 44.7 %
NRBC # BLD AUTO: 0 10*3/UL
NRBC BLD AUTO-RTO: 0 /100
PLATELET # BLD AUTO: 103 10E9/L (ref 150–450)
POTASSIUM SERPL-SCNC: 3.7 MMOL/L (ref 3.4–5.3)
PROCALCITONIN SERPL-MCNC: 0.08 NG/ML
PROT SERPL-MCNC: 6.3 G/DL (ref 6.8–8.8)
RBC # BLD AUTO: 3.92 10E12/L (ref 4.4–5.9)
SODIUM SERPL-SCNC: 141 MMOL/L (ref 133–144)
VANCOMYCIN SERPL-MCNC: 8.3 MG/L
WBC # BLD AUTO: 5.2 10E9/L (ref 4–11)

## 2017-09-21 PROCEDURE — 36592 COLLECT BLOOD FROM PICC: CPT | Performed by: STUDENT IN AN ORGANIZED HEALTH CARE EDUCATION/TRAINING PROGRAM

## 2017-09-21 PROCEDURE — 25000132 ZZH RX MED GY IP 250 OP 250 PS 637: Performed by: FAMILY MEDICINE

## 2017-09-21 PROCEDURE — 80053 COMPREHEN METABOLIC PANEL: CPT | Performed by: STUDENT IN AN ORGANIZED HEALTH CARE EDUCATION/TRAINING PROGRAM

## 2017-09-21 PROCEDURE — 87077 CULTURE AEROBIC IDENTIFY: CPT | Performed by: FAMILY MEDICINE

## 2017-09-21 PROCEDURE — 87186 SC STD MICRODIL/AGAR DIL: CPT | Performed by: FAMILY MEDICINE

## 2017-09-21 PROCEDURE — 87040 BLOOD CULTURE FOR BACTERIA: CPT | Performed by: STUDENT IN AN ORGANIZED HEALTH CARE EDUCATION/TRAINING PROGRAM

## 2017-09-21 PROCEDURE — 84145 PROCALCITONIN (PCT): CPT | Performed by: STUDENT IN AN ORGANIZED HEALTH CARE EDUCATION/TRAINING PROGRAM

## 2017-09-21 PROCEDURE — 25000128 H RX IP 250 OP 636: Performed by: FAMILY MEDICINE

## 2017-09-21 PROCEDURE — 90686 IIV4 VACC NO PRSV 0.5 ML IM: CPT | Performed by: FAMILY MEDICINE

## 2017-09-21 PROCEDURE — 87040 BLOOD CULTURE FOR BACTERIA: CPT | Performed by: FAMILY MEDICINE

## 2017-09-21 PROCEDURE — 86140 C-REACTIVE PROTEIN: CPT | Performed by: STUDENT IN AN ORGANIZED HEALTH CARE EDUCATION/TRAINING PROGRAM

## 2017-09-21 PROCEDURE — 12000001 ZZH R&B MED SURG/OB UMMC

## 2017-09-21 PROCEDURE — 93306 TTE W/DOPPLER COMPLETE: CPT | Mod: 26 | Performed by: INTERNAL MEDICINE

## 2017-09-21 PROCEDURE — 25000125 ZZHC RX 250: Performed by: STUDENT IN AN ORGANIZED HEALTH CARE EDUCATION/TRAINING PROGRAM

## 2017-09-21 PROCEDURE — 36415 COLL VENOUS BLD VENIPUNCTURE: CPT | Performed by: FAMILY MEDICINE

## 2017-09-21 PROCEDURE — 00000146 ZZHCL STATISTIC GLUCOSE BY METER IP

## 2017-09-21 PROCEDURE — 93306 TTE W/DOPPLER COMPLETE: CPT

## 2017-09-21 PROCEDURE — 25000128 H RX IP 250 OP 636: Performed by: STUDENT IN AN ORGANIZED HEALTH CARE EDUCATION/TRAINING PROGRAM

## 2017-09-21 PROCEDURE — 85025 COMPLETE CBC W/AUTO DIFF WBC: CPT | Performed by: STUDENT IN AN ORGANIZED HEALTH CARE EDUCATION/TRAINING PROGRAM

## 2017-09-21 PROCEDURE — 25000132 ZZH RX MED GY IP 250 OP 250 PS 637: Performed by: STUDENT IN AN ORGANIZED HEALTH CARE EDUCATION/TRAINING PROGRAM

## 2017-09-21 PROCEDURE — 80202 ASSAY OF VANCOMYCIN: CPT | Performed by: FAMILY MEDICINE

## 2017-09-21 PROCEDURE — 25000128 H RX IP 250 OP 636

## 2017-09-21 RX ORDER — HYDROXYZINE HYDROCHLORIDE 25 MG/1
25 TABLET, FILM COATED ORAL
Status: DISCONTINUED | OUTPATIENT
Start: 2017-09-21 | End: 2017-09-23 | Stop reason: HOSPADM

## 2017-09-21 RX ORDER — DEXTROSE MONOHYDRATE 25 G/50ML
INJECTION, SOLUTION INTRAVENOUS
Status: DISCONTINUED
Start: 2017-09-21 | End: 2017-09-22 | Stop reason: HOSPADM

## 2017-09-21 RX ORDER — DIPHENHYDRAMINE HYDROCHLORIDE 50 MG/ML
50 INJECTION INTRAMUSCULAR; INTRAVENOUS EVERY 8 HOURS PRN
Status: DISCONTINUED | OUTPATIENT
Start: 2017-09-21 | End: 2017-09-23 | Stop reason: HOSPADM

## 2017-09-21 RX ORDER — CYANOCOBALAMIN 1000 UG/ML
1000 INJECTION, SOLUTION INTRAMUSCULAR; SUBCUTANEOUS
Status: DISCONTINUED | OUTPATIENT
Start: 2017-09-21 | End: 2017-09-23 | Stop reason: HOSPADM

## 2017-09-21 RX ORDER — DIAZEPAM 10 MG/2ML
2.5 INJECTION, SOLUTION INTRAMUSCULAR; INTRAVENOUS
Status: DISCONTINUED | OUTPATIENT
Start: 2017-09-21 | End: 2017-09-22

## 2017-09-21 RX ORDER — LANOLIN ALCOHOL/MO/W.PET/CERES
3 CREAM (GRAM) TOPICAL
Status: DISCONTINUED | OUTPATIENT
Start: 2017-09-21 | End: 2017-09-23 | Stop reason: HOSPADM

## 2017-09-21 RX ADMIN — OXYCODONE HYDROCHLORIDE 15 MG: 5 SOLUTION ORAL at 12:31

## 2017-09-21 RX ADMIN — ONDANSETRON 4 MG: 4 TABLET, ORALLY DISINTEGRATING ORAL at 04:37

## 2017-09-21 RX ADMIN — SUCRALFATE 1 G: 1 SUSPENSION ORAL at 12:31

## 2017-09-21 RX ADMIN — INFLUENZA A VIRUS A/MICHIGAN/45/2015 X-275 (H1N1) ANTIGEN (FORMALDEHYDE INACTIVATED), INFLUENZA A VIRUS A/HONG KONG/4801/2014 X-263B (H3N2) ANTIGEN (FORMALDEHYDE INACTIVATED), INFLUENZA B VIRUS B/PHUKET/3073/2013 ANTIGEN (FORMALDEHYDE INACTIVATED), AND INFLUENZA B VIRUS B/BRISBANE/60/2008 ANTIGEN (FORMALDEHYDE INACTIVATED) 0.5 ML: 15; 15; 15; 15 INJECTION, SUSPENSION INTRAMUSCULAR at 12:31

## 2017-09-21 RX ADMIN — SUCRALFATE 1 G: 1 SUSPENSION ORAL at 17:23

## 2017-09-21 RX ADMIN — DIPHENHYDRAMINE HYDROCHLORIDE 50 MG: 50 INJECTION, SOLUTION INTRAMUSCULAR; INTRAVENOUS at 18:28

## 2017-09-21 RX ADMIN — SODIUM CHLORIDE: 9 INJECTION, SOLUTION INTRAVENOUS at 01:37

## 2017-09-21 RX ADMIN — DIPHENHYDRAMINE HYDROCHLORIDE 50 MG: 50 INJECTION, SOLUTION INTRAMUSCULAR; INTRAVENOUS at 09:46

## 2017-09-21 RX ADMIN — DEXTROAMPHETAMINE SACCHARATE, AMPHETAMINE ASPARTATE, DEXTROAMPHETAMINE SULFATE AND AMPHETAMINE SULFATE 20 MG: 1.25; 1.25; 1.25; 1.25 TABLET ORAL at 08:28

## 2017-09-21 RX ADMIN — ACETAMINOPHEN 650 MG: 325 SOLUTION ORAL at 21:14

## 2017-09-21 RX ADMIN — DEXTROAMPHETAMINE SACCHARATE, AMPHETAMINE ASPARTATE, DEXTROAMPHETAMINE SULFATE AND AMPHETAMINE SULFATE 20 MG: 1.25; 1.25; 1.25; 1.25 TABLET ORAL at 21:14

## 2017-09-21 RX ADMIN — OXYCODONE HYDROCHLORIDE 15 MG: 5 SOLUTION ORAL at 08:30

## 2017-09-21 RX ADMIN — OXYCODONE HYDROCHLORIDE 15 MG: 5 SOLUTION ORAL at 22:41

## 2017-09-21 RX ADMIN — CARVEDILOL 6.25 MG: 6.25 TABLET, FILM COATED ORAL at 18:28

## 2017-09-21 RX ADMIN — VANCOMYCIN HYDROCHLORIDE 1750 MG: 10 INJECTION, POWDER, LYOPHILIZED, FOR SOLUTION INTRAVENOUS at 10:18

## 2017-09-21 RX ADMIN — VANCOMYCIN HYDROCHLORIDE 1750 MG: 10 INJECTION, POWDER, LYOPHILIZED, FOR SOLUTION INTRAVENOUS at 19:03

## 2017-09-21 RX ADMIN — SODIUM CHLORIDE: 9 INJECTION, SOLUTION INTRAVENOUS at 22:40

## 2017-09-21 RX ADMIN — CYANOCOBALAMIN 1000 MCG: 1000 INJECTION, SOLUTION INTRAMUSCULAR at 21:15

## 2017-09-21 RX ADMIN — DIAZEPAM 2.5 MG: 5 INJECTION, SOLUTION INTRAMUSCULAR; INTRAVENOUS at 22:40

## 2017-09-21 RX ADMIN — OXYCODONE HYDROCHLORIDE 15 MG: 5 SOLUTION ORAL at 17:23

## 2017-09-21 RX ADMIN — SUCRALFATE 1 G: 1 SUSPENSION ORAL at 22:45

## 2017-09-21 RX ADMIN — CARVEDILOL 6.25 MG: 6.25 TABLET, FILM COATED ORAL at 08:28

## 2017-09-21 RX ADMIN — SUCRALFATE 1 G: 1 SUSPENSION ORAL at 08:28

## 2017-09-21 RX ADMIN — OXYCODONE HYDROCHLORIDE 15 MG: 5 SOLUTION ORAL at 04:37

## 2017-09-21 NOTE — PLAN OF CARE
"Problem: Goal Outcome Summary  Goal: Goal Outcome Summary  Outcome: No Change  /65 (BP Location: Left arm)  Pulse 83  Temp 96.1  F (35.6  C) (Oral)  Resp 16  Ht 1.803 m (5' 11\")  Wt 93.1 kg (205 lb 4.8 oz)  SpO2 100%  BMI 28.63 kg/m2  Afebrile. VSS on RA. Scheduled Coreg administered. Generalized arthritic pain and abdominal pain controlled on oxycodone 10-15 mg Q4H. Fentanyl patch on R deltoid. L Midline infusing NS at 100 ml/hr. Vanco infusion to be given at 2100 with Benadryl IV as premed. BG ordered Q6H, last at about 1800 with value of 82. Next check at midnight. TPN held due to positive PORT-A-Cath on R chest wall remains accessed. Questioned whether to remove/deaccess PORT and heparinize. Vascular access consulted and advised that flushing could cause infection to spread throughout blood stream. Per Ailin's resident plan may be to remove PORT in 1-2 days. Instructed by Melany newton later in shift to apply new dressing to site and primary to would follow up in the morning as to whether to remove needle or not. Pt has regular diet but has not eaten due to anticipated nausea. Pt is independent with ADLs and ambulates off unit at times. Voids spontaneously without saving output. Reports loose stool x1 today which he believes is from IV Abx. Continue to monitor and follow POC.       "

## 2017-09-21 NOTE — PLAN OF CARE
Problem: Goal Outcome Summary  Goal: Goal Outcome Summary  Outcome: No Change  AVSS. Patient c/o abd pain and headache, receiving prn oxycodone. Patient wife at bedside. Patient ambulating on unit and outside. Cultures taken from port site this afternoon. Poor po intake, IVF infused as ordered. Flu vaccine given. Patient declined glucose check at 1200. Continue with current plan of care. Plan to infuse vanco through port.

## 2017-09-21 NOTE — PHARMACY-VANCOMYCIN DOSING SERVICE
Pharmacy Vancomycin Note  Date of Service 2017  Patient's  1970   46 year old, male    Indication: Bacteremia  Goal Trough Level: 15-20 mg/L  Day of Therapy: 3   Current Vancomycin regimen:  1,750 mg IV q12h    Current estimated CrCl = Estimated Creatinine Clearance: 152.6 mL/min (based on Cr of 0.72).    Creatinine for last 3 days  2017: 12:37 AM Creatinine 0.72 mg/dL;  7:29 PM Creatinine 0.68 mg/dL  2017:  5:49 AM Creatinine 0.67 mg/dL  2017:  6:53 AM Creatinine 0.72 mg/dL    Recent Vancomycin Levels (past 3 days)  2017:  8:49 AM Vancomycin Level 8.3 mg/L    Vancomycin IV Administrations (past 72 hours)                   vancomycin (VANCOCIN) 1,750 mg in NaCl 0.9 % 500 mL intermittent infusion (mg) 1,750 mg New Bag 17 1018     1,750 mg New Bag 17 2126     1,750 mg New Bag  1138    vancomycin (VANCOCIN) 1,750 mg in NaCl 0.9 % 500 mL intermittent infusion (mg) 1,750 mg New Bag 17 2048                Nephrotoxins and other renal medications (Future)    Start     Dose/Rate Route Frequency Ordered Stop    17 1818  vancomycin (VANCOCIN) 1,750 mg in NaCl 0.9 % 500 mL intermittent infusion      1,750 mg  over 2 Hours Intravenous EVERY 8 HOURS 17 1027               Contrast Orders - past 72 hours     None          Interpretation of levels and current regimen:  Trough level is  Subtherapeutic    Has serum creatinine changed > 50% in last 72 hours: No    Urine output:  good urine output    Renal Function: Stable    Plan:  1.  Vancomycin 1,750 mg Q8H.   2.  Pharmacy will check trough levels as appropriate in 1-3 Days.    3. Serum creatinine levels will be ordered daily for the first week of therapy and at least twice weekly for subsequent weeks.      Alo Palumbo, PharmD Candidate        .

## 2017-09-21 NOTE — PLAN OF CARE
"Problem: Goal Outcome Summary  Goal: Goal Outcome Summary  Outcome: No Change  Alert and oriented. VS wnl on ra. Up ad vitor. C/o of abdominal pain and nausea, prn zofran and oxycodone administered with some relief. Continues with IVMF at 100cc/hr through right arm midline. Central line dressing changed; not to be flushed/accessed until discussed with primary team. Fentanyl patch in place on right upper arm. GJ tube clamped. Pt having trouble sleeping and requested to have valium 5mg for sleep and anxiety management. Writer paged CC MD and prn atarax and melatonin prescribed instead for tonight. Pt refused both and stated \"atarax and melatonin has not worked for me in the past.\" Writer will notify incoming RN to discuss with team about valium and whether or not it is appropriate to prescribe it.   R: Continue to monitor and implement POC      "

## 2017-09-22 ENCOUNTER — APPOINTMENT (OUTPATIENT)
Dept: INTERVENTIONAL RADIOLOGY/VASCULAR | Facility: CLINIC | Age: 47
DRG: 270 | End: 2017-09-22
Attending: NURSE PRACTITIONER
Payer: COMMERCIAL

## 2017-09-22 LAB
ALBUMIN SERPL-MCNC: 2.9 G/DL (ref 3.4–5)
ALP SERPL-CCNC: 87 U/L (ref 40–150)
ALT SERPL W P-5'-P-CCNC: 84 U/L (ref 0–70)
ANION GAP SERPL CALCULATED.3IONS-SCNC: 6 MMOL/L (ref 3–14)
AST SERPL W P-5'-P-CCNC: 31 U/L (ref 0–45)
BASOPHILS # BLD AUTO: 0 10E9/L (ref 0–0.2)
BASOPHILS NFR BLD AUTO: 0.2 %
BILIRUB SERPL-MCNC: 0.3 MG/DL (ref 0.2–1.3)
BUN SERPL-MCNC: 7 MG/DL (ref 7–30)
CALCIUM SERPL-MCNC: 7.7 MG/DL (ref 8.5–10.1)
CHLORIDE SERPL-SCNC: 111 MMOL/L (ref 94–109)
CO2 SERPL-SCNC: 24 MMOL/L (ref 20–32)
CREAT SERPL-MCNC: 0.59 MG/DL (ref 0.66–1.25)
CRP SERPL-MCNC: 60 MG/L (ref 0–8)
DIFFERENTIAL METHOD BLD: ABNORMAL
EOSINOPHIL # BLD AUTO: 0.3 10E9/L (ref 0–0.7)
EOSINOPHIL NFR BLD AUTO: 4.3 %
ERYTHROCYTE [DISTWIDTH] IN BLOOD BY AUTOMATED COUNT: 16.6 % (ref 10–15)
GFR SERPL CREATININE-BSD FRML MDRD: >90 ML/MIN/1.7M2
GLUCOSE BLDC GLUCOMTR-MCNC: 101 MG/DL (ref 70–99)
GLUCOSE SERPL-MCNC: 101 MG/DL (ref 70–99)
HBV SURFACE AB SERPL IA-ACNC: 0.09 M[IU]/ML
HBV SURFACE AG SERPL QL IA: NONREACTIVE
HCT VFR BLD AUTO: 33.2 % (ref 40–53)
HCV AB SERPL QL IA: NONREACTIVE
HGB BLD-MCNC: 10.6 G/DL (ref 13.3–17.7)
IMM GRANULOCYTES # BLD: 0 10E9/L (ref 0–0.4)
IMM GRANULOCYTES NFR BLD: 0 %
INR PPP: 1.07 (ref 0.86–1.14)
LYMPHOCYTES # BLD AUTO: 1.6 10E9/L (ref 0.8–5.3)
LYMPHOCYTES NFR BLD AUTO: 25.8 %
MAGNESIUM SERPL-MCNC: 2 MG/DL (ref 1.6–2.3)
MCH RBC QN AUTO: 28.9 PG (ref 26.5–33)
MCHC RBC AUTO-ENTMCNC: 31.9 G/DL (ref 31.5–36.5)
MCV RBC AUTO: 91 FL (ref 78–100)
MONOCYTES # BLD AUTO: 0.8 10E9/L (ref 0–1.3)
MONOCYTES NFR BLD AUTO: 12.3 %
NEUTROPHILS # BLD AUTO: 3.7 10E9/L (ref 1.6–8.3)
NEUTROPHILS NFR BLD AUTO: 57.4 %
NRBC # BLD AUTO: 0 10*3/UL
NRBC BLD AUTO-RTO: 0 /100
PHOSPHATE SERPL-MCNC: 1.6 MG/DL (ref 2.5–4.5)
PLATELET # BLD AUTO: 103 10E9/L (ref 150–450)
POTASSIUM SERPL-SCNC: 3.3 MMOL/L (ref 3.4–5.3)
PREALB SERPL IA-MCNC: 14 MG/DL (ref 15–45)
PROT SERPL-MCNC: 6.1 G/DL (ref 6.8–8.8)
RBC # BLD AUTO: 3.67 10E12/L (ref 4.4–5.9)
SODIUM SERPL-SCNC: 141 MMOL/L (ref 133–144)
VANCOMYCIN SERPL-MCNC: 16.8 MG/L
WBC # BLD AUTO: 6.4 10E9/L (ref 4–11)

## 2017-09-22 PROCEDURE — 80202 ASSAY OF VANCOMYCIN: CPT | Performed by: FAMILY MEDICINE

## 2017-09-22 PROCEDURE — 87040 BLOOD CULTURE FOR BACTERIA: CPT | Performed by: FAMILY MEDICINE

## 2017-09-22 PROCEDURE — 86140 C-REACTIVE PROTEIN: CPT | Performed by: STUDENT IN AN ORGANIZED HEALTH CARE EDUCATION/TRAINING PROGRAM

## 2017-09-22 PROCEDURE — 87070 CULTURE OTHR SPECIMN AEROBIC: CPT | Performed by: PHYSICIAN ASSISTANT

## 2017-09-22 PROCEDURE — 99153 MOD SED SAME PHYS/QHP EA: CPT

## 2017-09-22 PROCEDURE — 25000125 ZZHC RX 250: Performed by: RADIOLOGY

## 2017-09-22 PROCEDURE — 25000128 H RX IP 250 OP 636: Performed by: FAMILY MEDICINE

## 2017-09-22 PROCEDURE — 36415 COLL VENOUS BLD VENIPUNCTURE: CPT | Performed by: FAMILY MEDICINE

## 2017-09-22 PROCEDURE — 12000001 ZZH R&B MED SURG/OB UMMC

## 2017-09-22 PROCEDURE — 25000132 ZZH RX MED GY IP 250 OP 250 PS 637: Performed by: FAMILY MEDICINE

## 2017-09-22 PROCEDURE — 25000128 H RX IP 250 OP 636: Performed by: RADIOLOGY

## 2017-09-22 PROCEDURE — 84100 ASSAY OF PHOSPHORUS: CPT | Performed by: STUDENT IN AN ORGANIZED HEALTH CARE EDUCATION/TRAINING PROGRAM

## 2017-09-22 PROCEDURE — 85025 COMPLETE CBC W/AUTO DIFF WBC: CPT | Performed by: STUDENT IN AN ORGANIZED HEALTH CARE EDUCATION/TRAINING PROGRAM

## 2017-09-22 PROCEDURE — 99152 MOD SED SAME PHYS/QHP 5/>YRS: CPT

## 2017-09-22 PROCEDURE — 36592 COLLECT BLOOD FROM PICC: CPT | Performed by: STUDENT IN AN ORGANIZED HEALTH CARE EDUCATION/TRAINING PROGRAM

## 2017-09-22 PROCEDURE — 0JPT0WZ REMOVAL OF TOTALLY IMPLANTABLE VASCULAR ACCESS DEVICE FROM TRUNK SUBCUTANEOUS TISSUE AND FASCIA, OPEN APPROACH: ICD-10-PCS | Performed by: PHYSICIAN ASSISTANT

## 2017-09-22 PROCEDURE — 85610 PROTHROMBIN TIME: CPT | Performed by: STUDENT IN AN ORGANIZED HEALTH CARE EDUCATION/TRAINING PROGRAM

## 2017-09-22 PROCEDURE — 36590 REMOVAL TUNNELED CV CATH: CPT

## 2017-09-22 PROCEDURE — 84134 ASSAY OF PREALBUMIN: CPT | Performed by: STUDENT IN AN ORGANIZED HEALTH CARE EDUCATION/TRAINING PROGRAM

## 2017-09-22 PROCEDURE — 27210904 ZZH KIT CR6

## 2017-09-22 PROCEDURE — 25000132 ZZH RX MED GY IP 250 OP 250 PS 637: Performed by: STUDENT IN AN ORGANIZED HEALTH CARE EDUCATION/TRAINING PROGRAM

## 2017-09-22 PROCEDURE — 40000257 ZZH STATISTIC CONSULT NO CHARGE VASC ACCESS

## 2017-09-22 PROCEDURE — 27210738 ZZH ACCESSORY CR2

## 2017-09-22 PROCEDURE — 40000141 ZZH STATISTIC PERIPHERAL IV START W/O US GUIDANCE

## 2017-09-22 PROCEDURE — 83735 ASSAY OF MAGNESIUM: CPT | Performed by: STUDENT IN AN ORGANIZED HEALTH CARE EDUCATION/TRAINING PROGRAM

## 2017-09-22 PROCEDURE — 02PY03Z REMOVAL OF INFUSION DEVICE FROM GREAT VESSEL, OPEN APPROACH: ICD-10-PCS | Performed by: PHYSICIAN ASSISTANT

## 2017-09-22 PROCEDURE — 00000146 ZZHCL STATISTIC GLUCOSE BY METER IP

## 2017-09-22 PROCEDURE — 25000128 H RX IP 250 OP 636: Performed by: STUDENT IN AN ORGANIZED HEALTH CARE EDUCATION/TRAINING PROGRAM

## 2017-09-22 PROCEDURE — 80053 COMPREHEN METABOLIC PANEL: CPT | Performed by: STUDENT IN AN ORGANIZED HEALTH CARE EDUCATION/TRAINING PROGRAM

## 2017-09-22 PROCEDURE — 25000125 ZZHC RX 250: Performed by: STUDENT IN AN ORGANIZED HEALTH CARE EDUCATION/TRAINING PROGRAM

## 2017-09-22 RX ORDER — DIAZEPAM 10 MG/2ML
2.5 INJECTION, SOLUTION INTRAMUSCULAR; INTRAVENOUS ONCE
Status: COMPLETED | OUTPATIENT
Start: 2017-09-22 | End: 2017-09-22

## 2017-09-22 RX ORDER — FENTANYL CITRATE 50 UG/ML
25-50 INJECTION, SOLUTION INTRAMUSCULAR; INTRAVENOUS EVERY 5 MIN PRN
Status: DISCONTINUED | OUTPATIENT
Start: 2017-09-22 | End: 2017-09-22 | Stop reason: HOSPADM

## 2017-09-22 RX ORDER — CEFAZOLIN SODIUM 1 G/50ML
1750 SOLUTION INTRAVENOUS EVERY 8 HOURS
Qty: 21000 ML | Refills: 0 | Status: SHIPPED | OUTPATIENT
Start: 2017-09-22 | End: 2017-10-06

## 2017-09-22 RX ORDER — POTASSIUM CHLORIDE 29.8 MG/ML
20 INJECTION INTRAVENOUS
Status: DISCONTINUED | OUTPATIENT
Start: 2017-09-22 | End: 2017-09-22

## 2017-09-22 RX ORDER — DIAZEPAM 10 MG/2ML
5 INJECTION, SOLUTION INTRAMUSCULAR; INTRAVENOUS
Status: DISCONTINUED | OUTPATIENT
Start: 2017-09-22 | End: 2017-09-23 | Stop reason: HOSPADM

## 2017-09-22 RX ORDER — FLUMAZENIL 0.1 MG/ML
0.2 INJECTION, SOLUTION INTRAVENOUS
Status: DISCONTINUED | OUTPATIENT
Start: 2017-09-22 | End: 2017-09-22 | Stop reason: HOSPADM

## 2017-09-22 RX ORDER — POTASSIUM CHLORIDE 7.45 MG/ML
10 INJECTION INTRAVENOUS
Status: DISCONTINUED | OUTPATIENT
Start: 2017-09-22 | End: 2017-09-23 | Stop reason: HOSPADM

## 2017-09-22 RX ORDER — POTASSIUM CHLORIDE 1.5 G/1.58G
20-40 POWDER, FOR SOLUTION ORAL
Status: DISCONTINUED | OUTPATIENT
Start: 2017-09-22 | End: 2017-09-23 | Stop reason: HOSPADM

## 2017-09-22 RX ORDER — POTASSIUM CHLORIDE 750 MG/1
20-40 TABLET, EXTENDED RELEASE ORAL
Status: DISCONTINUED | OUTPATIENT
Start: 2017-09-22 | End: 2017-09-23 | Stop reason: HOSPADM

## 2017-09-22 RX ORDER — POTASSIUM CL/LIDO/0.9 % NACL 10MEQ/0.1L
10 INTRAVENOUS SOLUTION, PIGGYBACK (ML) INTRAVENOUS
Status: DISCONTINUED | OUTPATIENT
Start: 2017-09-22 | End: 2017-09-23 | Stop reason: HOSPADM

## 2017-09-22 RX ORDER — NALOXONE HYDROCHLORIDE 0.4 MG/ML
.1-.4 INJECTION, SOLUTION INTRAMUSCULAR; INTRAVENOUS; SUBCUTANEOUS
Status: DISCONTINUED | OUTPATIENT
Start: 2017-09-22 | End: 2017-09-22 | Stop reason: HOSPADM

## 2017-09-22 RX ADMIN — SUCRALFATE 1 G: 1 SUSPENSION ORAL at 17:25

## 2017-09-22 RX ADMIN — FENTANYL 1 PATCH: 50 PATCH, EXTENDED RELEASE TRANSDERMAL at 09:36

## 2017-09-22 RX ADMIN — LIDOCAINE HYDROCHLORIDE 25 ML: 10 INJECTION, SOLUTION EPIDURAL; INFILTRATION; INTRACAUDAL; PERINEURAL at 12:08

## 2017-09-22 RX ADMIN — VANCOMYCIN HYDROCHLORIDE 1750 MG: 10 INJECTION, POWDER, LYOPHILIZED, FOR SOLUTION INTRAVENOUS at 02:16

## 2017-09-22 RX ADMIN — DIPHENHYDRAMINE HYDROCHLORIDE 50 MG: 50 INJECTION, SOLUTION INTRAMUSCULAR; INTRAVENOUS at 22:51

## 2017-09-22 RX ADMIN — CARVEDILOL 6.25 MG: 6.25 TABLET, FILM COATED ORAL at 08:30

## 2017-09-22 RX ADMIN — OXYCODONE HYDROCHLORIDE 15 MG: 5 SOLUTION ORAL at 17:25

## 2017-09-22 RX ADMIN — DIAZEPAM 2.5 MG: 5 INJECTION, SOLUTION INTRAMUSCULAR; INTRAVENOUS at 03:16

## 2017-09-22 RX ADMIN — SUCRALFATE 1 G: 1 SUSPENSION ORAL at 08:30

## 2017-09-22 RX ADMIN — OXYCODONE HYDROCHLORIDE 15 MG: 5 SOLUTION ORAL at 12:25

## 2017-09-22 RX ADMIN — SODIUM CHLORIDE: 9 INJECTION, SOLUTION INTRAVENOUS at 06:25

## 2017-09-22 RX ADMIN — SUCRALFATE 1 G: 1 SUSPENSION ORAL at 23:13

## 2017-09-22 RX ADMIN — Medication 10 MEQ: at 18:50

## 2017-09-22 RX ADMIN — DIPHENHYDRAMINE HYDROCHLORIDE 50 MG: 50 INJECTION, SOLUTION INTRAMUSCULAR; INTRAVENOUS at 12:16

## 2017-09-22 RX ADMIN — DEXTROAMPHETAMINE SACCHARATE, AMPHETAMINE ASPARTATE, DEXTROAMPHETAMINE SULFATE AND AMPHETAMINE SULFATE 20 MG: 1.25; 1.25; 1.25; 1.25 TABLET ORAL at 20:37

## 2017-09-22 RX ADMIN — OXYCODONE HYDROCHLORIDE 15 MG: 5 SOLUTION ORAL at 03:18

## 2017-09-22 RX ADMIN — CARVEDILOL 6.25 MG: 6.25 TABLET, FILM COATED ORAL at 17:25

## 2017-09-22 RX ADMIN — VANCOMYCIN HYDROCHLORIDE 1750 MG: 10 INJECTION, POWDER, LYOPHILIZED, FOR SOLUTION INTRAVENOUS at 23:25

## 2017-09-22 RX ADMIN — FENTANYL CITRATE 150 MCG: 50 INJECTION, SOLUTION INTRAMUSCULAR; INTRAVENOUS at 12:07

## 2017-09-22 RX ADMIN — DIAZEPAM 5 MG: 5 INJECTION, SOLUTION INTRAMUSCULAR; INTRAVENOUS at 23:21

## 2017-09-22 RX ADMIN — OXYCODONE HYDROCHLORIDE 15 MG: 5 SOLUTION ORAL at 22:58

## 2017-09-22 RX ADMIN — MIDAZOLAM 3 MG: 1 INJECTION INTRAMUSCULAR; INTRAVENOUS at 12:07

## 2017-09-22 RX ADMIN — DEXTROAMPHETAMINE SACCHARATE, AMPHETAMINE ASPARTATE, DEXTROAMPHETAMINE SULFATE AND AMPHETAMINE SULFATE 20 MG: 1.25; 1.25; 1.25; 1.25 TABLET ORAL at 10:09

## 2017-09-22 RX ADMIN — DIPHENHYDRAMINE HYDROCHLORIDE 50 MG: 50 INJECTION, SOLUTION INTRAMUSCULAR; INTRAVENOUS at 01:39

## 2017-09-22 RX ADMIN — VANCOMYCIN HYDROCHLORIDE 1750 MG: 10 INJECTION, POWDER, LYOPHILIZED, FOR SOLUTION INTRAVENOUS at 14:33

## 2017-09-22 RX ADMIN — ONDANSETRON 4 MG: 4 TABLET, ORALLY DISINTEGRATING ORAL at 03:19

## 2017-09-22 RX ADMIN — SUCRALFATE 1 G: 1 SUSPENSION ORAL at 12:29

## 2017-09-22 RX ADMIN — OXYCODONE HYDROCHLORIDE 15 MG: 5 SOLUTION ORAL at 08:30

## 2017-09-22 RX ADMIN — ACETAMINOPHEN 650 MG: 325 SOLUTION ORAL at 03:18

## 2017-09-22 NOTE — PLAN OF CARE
Problem: Goal Outcome Summary  Goal: Goal Outcome Summary  Patient receivng vanco through port. Refusing iv fluids and tpn.. Patient asking for his fluids now.  Patient up walking independently. Patient had blood cultures drawn after temp and tylenol given. P;cont to monitor

## 2017-09-22 NOTE — PROGRESS NOTES
[facility]  General Infectious Disease Progress Note     Patient:  Parker Acevedo , Date of birth 1970, Medical record number [id number]  Date of Visit:  September 22, 2017         Assessment and Recommendations:   Problem List:  Catheter associated bloodstream infection due to strep salivarius   Transaminitis  Indwelling port s/p removal on 9/21  Celestino en y gastric bypass, short gut syndrome, on chronic TPN  History of bacteremia secondary to infected port/PICC    Impression:  Parker Acevedo is a 46 year old male with a history of celestino-en-y gastric bypass complicated by short gut syndrome now dependant on chronic TPN, multiple episodes of bacteremia secondary to infected PICC/port, PUD, anemia, and ADHD who was admitted on 9/19 with fevers, chills and strep salivarius bacteremia, associated with his port. Given further positive cultures and fevers despite antibiotics, port was removed on 9/21. Currently on Vancomycin given severe Penicillin allergy. Would plan to continue for 14 days, 9/22 is day 1 given presence of port up until now. TTE negative, low suspicion for endocarditis.      Recommendations:  1. Continue Vancomycin for 14 days -d1=9/22 -goal trough 15-20  2. Patient will need PICC line for antibiotics (midline is likely contaminated with strep)  3. Please obtain weekly CBC, CMP, and vanc trough while on IV antibiotics -please fax to Infectious diseases clinic  4. Please arrange PCP follow up in 7-10 days    ID will sign off. Call if questions arise        Attestation:  I have reviewed today's vital signs, medications, labs and imaging.  Mikaela Ma MD  Pager [phone]          Interval History:       Patient had a fever overnight. Currently feels well.          Review of Systems:   CONSTITUTIONAL:  No  chills  EYES: negative for icterus  ENT:  negative for oral lesions, hearing loss, tinnitus and sore throat  RESPIRATORY:  negative for cough and  dyspnea  CARDIOVASCULAR:  negative for chest pain, palpitations  GASTROINTESTINAL:  negative for nausea, vomiting, diarrhea and constipation  GENITOURINARY:  negative for dysuria  HEME:  No easy bruising/bleeding  INTEGUMENT:  negative for rash and pruritus  NEURO:  Negative for headache         Current Antimicrobials     Vancomycin 1750mg IV q8h d1 9/19       Physical Exam:   Ranges for vital signs:  Temp:  [96.3  F (35.7  C)-100.8  F (38.2  C)] 97.9  F (36.6  C)  Pulse:  [97-99] 99  Heart Rate:  [69-84] 84  Resp:  [10-19] 18  BP: (113-130)/(70-91) 130/70  SpO2:  [94 %-100 %] 100 %      Exam:  GENERAL:  well-developed, well-nourished, in no acute distress.   ENT:  Head is normocephalic, atraumatic. Oropharynx is moist without exudates or ulcers.  EYES:  Eyes have anicteric sclerae. PERRL.   NECK:  Supple. No cervical lymphadenopathy  LUNGS:  Clear to auscultation bilaterally. No wheezes or crackles.  CARDIOVASCULAR:  Regular rate and rhythm with no murmurs, gallops or rubs.  EXT: Extremities warm and without edema.  SKIN:  No acute rashes.   NEUROLOGIC:  Grossly nonfocal.    ACCESS: Port removed, RUE midline -no surrounding erythema         Laboratory Data:     Creatinine   Date Value Ref Range Status   09/22/2017 0.59 (L) 0.66 - 1.25 mg/dL Final   09/21/2017 0.72 0.66 - 1.25 mg/dL Final   09/20/2017 0.67 0.66 - 1.25 mg/dL Final   09/19/2017 0.68 0.66 - 1.25 mg/dL Final   09/19/2017 0.72 0.66 - 1.25 mg/dL Final     WBC   Date Value Ref Range Status   09/22/2017 6.4 4.0 - 11.0 10e9/L Final   09/21/2017 5.2 4.0 - 11.0 10e9/L Final   09/20/2017 5.3 4.0 - 11.0 10e9/L Final   09/19/2017 4.8 4.0 - 11.0 10e9/L Final   09/19/2017 8.5 4.0 - 11.0 10e9/L Final     Hemoglobin   Date Value Ref Range Status   09/22/2017 10.6 (L) 13.3 - 17.7 g/dL Final     Platelet Count   Date Value Ref Range Status   09/22/2017 103 (L) 150 - 450 10e9/L Final     Sed Rate   Date Value Ref Range Status   06/28/2017 26 (H) 0 - 15 mm/h Final    03/12/2017 17 (H) 0 - 15 mm/h Final   11/01/2016 27 (H) 0 - 15 mm/h Final   04/20/2016 34 (H) 0 - 15 mm/h Final   04/11/2016 11 0 - 15 mm/h Final     CRP Inflammation   Date Value Ref Range Status   09/22/2017 60.0 (H) 0.0 - 8.0 mg/L Final   09/21/2017 88.0 (H) 0.0 - 8.0 mg/L Final   09/20/2017 82.0 (H) 0.0 - 8.0 mg/L Final   09/19/2017 57.0 (H) 0.0 - 8.0 mg/L Final   08/13/2017 <2.9 0.0 - 8.0 mg/L Final     AST   Date Value Ref Range Status   09/22/2017 31 0 - 45 U/L Final   09/21/2017 56 (H) 0 - 45 U/L Final   09/20/2017 135 (H) 0 - 45 U/L Final   09/19/2017 222 (H) 0 - 45 U/L Final   09/15/2017 15 0 - 45 U/L Final     ALT   Date Value Ref Range Status   09/22/2017 84 (H) 0 - 70 U/L Final   09/21/2017 122 (H) 0 - 70 U/L Final   09/20/2017 192 (H) 0 - 70 U/L Final   09/19/2017 220 (H) 0 - 70 U/L Final   09/15/2017 23 0 - 70 U/L Final     Bilirubin Total   Date Value Ref Range Status   09/22/2017 0.3 0.2 - 1.3 mg/dL Final   09/21/2017 0.4 0.2 - 1.3 mg/dL Final   09/20/2017 0.5 0.2 - 1.3 mg/dL Final   09/19/2017 0.4 0.2 - 1.3 mg/dL Final   09/15/2017 0.4 0.2 - 1.3 mg/dL Final     Lab Results   Component Value Date     09/22/2017    BUN 7 09/22/2017    CO2 24 09/22/2017       Culture data:    9/21 Blood cultures x2 NGTD  9/20 Blood cultures: 1/2 GPCs in pairs and chains  9/19 Blood cultures: Strep salviarius, strep mitis    All cultures:    Recent Labs  Lab 09/22/17  1130 09/21/17  2038 09/21/17  2033 09/21/17  1045 09/21/17  0656 09/21/17  0653 09/20/17  0556 09/20/17  0549 09/19/17 2010 09/19/17  1929 09/19/17  0038 09/19/17  0037   CULT PENDING No growth after 9 hours No growth after 9 hours Cultured on the 1st day of incubation:Gram positive cocci in pairs and chains*  Critical Value/Significant Value, preliminary result only, called to and read back byKarine Meyers RN U5B 0400 09.22.17 CF No growth after 22 hours No growth after 22 hours No growth after 2 days No growth after 2 days No growth after 3 days  Cultured on the 2nd day of incubation:Streptococcus salivarius groupSpeciation in progressReferred to research section for identificationSusceptibility testing done on previous specimen*  Critical Value/Significant Value, preliminary result only, called to and read back byAnnel Hirsch RN at 0814 on 17.KD  Cultured on the 1st day of incubation:Streptococcus salivarius groupSpeciation in progressReferred to research section for identification*  Critical Value/Significant Value, preliminary result only, called to and read back byDr. Jimenez, from [facility]. 17 at 1357. GR. Cultured on the 1st day of incubation:Streptococcus salivarius groupSpeciation in progressReferred to research section for identificationSusceptibility testing done on previous specimen*  Critical Value/Significant Value, preliminary result only, called to and read back byDr. Jimenez from [facility]. 17 at 1041. GR.  Cultured on the 1st day of incubation:Streptococcus mitis groupSusceptibility testing in progress*  (Note)NEGATIVE for the following: Staphylococcus spp., Staph aureus, Staphepidermidis, Staph lugdunensis, Streptococcus spp., Strep pneumoniae,Strep pyogenes, Strep agalactiae, Strep anginosus group, Enterococcusfaecalis, Enterococcus faecium, and Listeria spp. by Verigenemultiplex nucleic acid test. Final identification and antimicrobialsusceptibility testing will be verified by standard methods.Critical Value/Significant Value called to and read back by  from [facility]. 17 at 1357. GR.       Imagin/21 TTE Negative

## 2017-09-22 NOTE — IR NOTE
Interventional Radiology Intra-procedural Nursing Note    Patient Name: Parker Acevedo Sr  Medical Record Number: [id number]  Today's Date: September 22, 2017    Start Time: 1120  End of procedure time: 1200  Procedure: Port a cath removal.   Report given to: Jose Luis HARRISON      Other Notes:  Pt arrived from 5 to IR 4. Consent obtained and questions answered. Pt placed supine for procedure. Pt tolerated procedure w/o issue. Catheter tip sent to lab.  Pt back to 5 for POC.    Fentanyl:150mcg  Versed: 3mg        Mendy Sutton RN

## 2017-09-22 NOTE — PLAN OF CARE
Problem: Goal Outcome Summary  Goal: Goal Outcome Summary  Outcome: No Change  Patient transferred to room 231-2 at start of the shift. Alert and oriented x 4, cooperative with cares, ambulatory and independent with ADLs. With ongoing IVF NS at 100 ml/hr via midline. Was seen and rounded by primary team and ordered Chest port removal. He was then sent to IR for Port removal and came back in stable condition and ambulating in room with ease. Oxycodone given for abdominal pain x 2 this shift. G tube clamped. Vancomycin infusion not given yet as of this time as patient leaves the unit to go down to lobby to smoke. Had been called 2x already.  P: Continue to administer ABx as ordered. Possible DC to home tomorrow.

## 2017-09-22 NOTE — PROGRESS NOTES
RN requested to assess midline on right upper arm, per patient, sore and painful. Flushed midline, no blood return noted but flushing well and according to patient, it's not as painful, only when it's flushed forcefully. No swelling noted. Advised patient not to use if it's painful. He said it's just sore when he moves his arm in certain way. Patient refused PIV and preferred to use midline for now and will let the nurse know if pain persist with infusion.

## 2017-09-22 NOTE — PROGRESS NOTES
Care Coordinator- Discharge Planning     Admission Date/Time:  9/19/2017  Attending MD:  Antoine Bonner MD     Data    Chart reviewed, discussed with interdisciplinary team.   Patient was admitted for:   1. Bacteremia         Assessment  Met with Team in rounds, recommendation is that patient  Remain inpatient over the weekend for continued  Blood cultures  (infection at port site.) Patient wanting to leave by 1:30PM.  Patient has  Midline that can be used for  IV ABX but will not have line for TPN.    This  met with patient at bedside to discuss discharge planning and encourage patient to remain inpatient through the weekend patient adamant that he will leave at 1:30 PM. Informed him that he will need  Blood cultures throughout the weekend.    CM reported meeting to Ailin's Team, they will meet with patient.        Coordination of Care and Referrals: [facility] and Shaylee RUIZ  Notified of discharge and need for IV ABX  Completed referral to [facility] states his  Wife will transport him.    ADDENDUM:  Patient had port removed will be discharged to  Tomorrow with IV ABX and daily blood cultures      Plan  Anticipated Discharge Date:  9/23/17  Anticipated Discharge Plan:  Home with IV ABX    CTS Handoff completed:  YES    Kayy Jain, RN

## 2017-09-22 NOTE — CONSULTS
Patient is on IR schedule 9/22/2017 for a right sided chest port removal. This is the 4th port that has been removed in this patient for bacteremia. We recommend that ports not be placed for TPN administration. Tunneled lines are generally more appropriate for use in these cases.   Labs WNL for procedure.    Orders for NPO, scrubs and antibiotics have been entered.  Consent will be done prior to procedure.     Please contact the IR charge RN at [phone] for estimated time of procedure.     Case discussed with Dr. Pink and Dr. Urbina from IR.    Patti Garvey, WARD, APRN  Interventional Radiology  Phone: [phone]  Pager: [phone]

## 2017-09-22 NOTE — PHARMACY-VANCOMYCIN DOSING SERVICE
Pharmacy Vancomycin Note  Date of Service 2017  Patient's  1970   46 year old, male  Actual wt: 92.5 kg     Indication: Bacteremia  Goal Trough Level: 15-20 mg/L  Day of Therapy: 4  Current Vancomycin regimen:  1,750 mg IV q8h    Current estimated CrCl = Estimated Creatinine Clearance: 185.8 mL/min (based on Cr of 0.59).    Creatinine for last 3 days  2017:  7:29 PM Creatinine 0.68 mg/dL  2017:  5:49 AM Creatinine 0.67 mg/dL  2017:  6:53 AM Creatinine 0.72 mg/dL  2017:  8:21 AM Creatinine 0.59 mg/dL    Recent Vancomycin Levels (past 3 days)  2017:  8:49 AM Vancomycin Level 8.3 mg/L  2017:  9:39 AM Vancomycin Level 16.8 mg/L (7.5 hr trough).     Vancomycin IV Administrations (past 72 hours)                   vancomycin (VANCOCIN) 1,750 mg in NaCl 0.9 % 500 mL intermittent infusion (mg) 1,750 mg New Bag 17 0216     1,750 mg New Bag 17 1903    vancomycin (VANCOCIN) 1,750 mg in NaCl 0.9 % 500 mL intermittent infusion (mg) 1,750 mg New Bag 17 1018     1,750 mg New Bag 17 2126     1,750 mg New Bag  1138                Nephrotoxins and other renal medications (Future)    Start     Dose/Rate Route Frequency Ordered Stop    17 1818  vancomycin (VANCOCIN) 1,750 mg in NaCl 0.9 % 500 mL intermittent infusion      1,750 mg  over 2 Hours Intravenous EVERY 8 HOURS 17 1027               Contrast Orders - past 72 hours     None          Interpretation of levels and current regimen:  Trough level is  Therapeutic    Has serum creatinine changed > 50% in last 72 hours: No    Urine output:  good urine output    Renal Function: Stable    Plan:  1.  Continue Current Dose  2.  Pharmacy will check trough levels as appropriate in 1-3 Days.    3. Serum creatinine levels will be ordered daily for the first week of therapy and at least twice weekly for subsequent weeks.      Alo Palumbo, PharmD Candidate        .

## 2017-09-22 NOTE — DISCHARGE INSTRUCTIONS
You will be receiving a call from the Infectious Disease clinic to confirm the time and date of your follow up appointment. This appointment should be on Tuesday 9/26. If you do not hear from them within 2 business days, please call them at [phone].

## 2017-09-22 NOTE — PROGRESS NOTES
[facility] - Inpatient daily progress note    Date of admission: 9/19/2017  Date of service: 9/22/2017.         Assessment and Plan:   Assessment:   Parker Acevedo Sr is a 46 year old  male with significant past medical history of gastric bypass (2002) with subsequent development of short gut syndrome now dependent on TPN (2 years) and history of bacteremia secondary to line infections, who presents with fevers, chills and generalized malaise found to have positive Strep salivarius, admitted for management of bacteremia and port removal.   Patient Active Problem List   Diagnosis     Peptic ulcer disease     Gastric bypass status for obesity     CARDIOVASCULAR SCREENING; LDL GOAL LESS THAN 160     Chronic abdominal pain     Ulcer (H)     Vomiting     Anemia     ADHD (attention deficit hyperactivity disorder), inattentive type     Vitamin B12 deficiency without anemia     Thiamine deficiency     Dehydration     Dysphagia     Weight loss, non-intentional     Malnutrition (H)     Chronic anxiety     Constipation     Bile reflux esophagitis     Former smoker     Vitamin D deficiency     Iron deficiency     Health Care Home     Chronic pain     Insomnia     Positive blood culture     Fungemia     Chronic nausea     Gastrostomy tube in place (H)     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 He remains hemodynamically stable. Blood cultures drawn 9/19 positive for Strep salivarius from the right arm and portacath and now blood culture on 9/21 from port is growing G+ cocci in pairs and  chains. Patient has a history of multiple episodes of bacteremia secondary to infected PICC/port. TTE is negative for endocarditis.   - ID consult, appreciate recs  - IR removal of infected port   - continue vancomycin with pretreatment with slow infusion and benadryl    - await final blood culture results with sensitivities  - trend CRP, CBC  - continue daily blood cultures  - Continue peripheral nutrition   - PICC placement tomorrow pending afebrile for at least 24 hours and negative blood cultures 9/22/17 per ID recs      ##Cough  ##Possible CAP  Parker reports a history of cough, however this is a chronic intermittent issue likely secondary to history of smoking. CXR revealed an opacity of the left posterior costophrenic angle which may represent infection or atelectasis. Procalcitonin in low risk range.   -will hold off on broadening antibiotics to cover for CAP  -continuous pulse ox     ##Elevated LFT's  AST and ALT both elevated but trending down. Etiology unclear. No prior history of elevated LFTs per chart review. Denies alcohol use. Normal abdominal exam. RUQ ultrasound does not show evidence of steatosis or cirrhosis.   - repeat CMP in AM  - pending hepatitis B/C viral tests     ##h/o gastric bypass  ##short gut syndrome  ##malnutrition  -continue PPN until PICC placement for TPN  -continue PTA Vit B12 and Vit D supplements     ##chronic pain syndrome  ##chronic back pain  Follows with [facility]. Last visit 9/18/2017.   -continue PTA oxycodone 10-15mg q4h prn, max 55mg per day  -continue PTA fentanyl 50mcg patch q48hr. Next due change 9/20     ##h/o palpitations, intermittent chest pain  ##h/o decreased LVEF  Echo done 1/16/2016 showed midly reduced LV systolic function (LVEF 45-50%) and mild diffuse hypokinesis. Dobutamine stress echo done 2/13/2017 showed mild cardiomyopathy with good contractile reserve and no inducible ischemia, mild global hypokinesia of left ventricle with LVEF  45-50%. Seen by cardiolology on 7/24/2017 where he was started on Coreg 3.125mg BID. Then later seen by Cardiology at [facility] on 8/31/2017 for history of ongoing palpitations. At that time Coreg was increased from 3.125mg BID to 6.25mg BID and a cardiac event monitor was placed for a 30 day monitoring period.  - continue PTA coreg 6.25mg BID  - continue PTA cardiac event monitor  - Follow up with Cardiology outpatient      ##PUD  - continue PTA carafate      ##Iron Deficiency Anemia  Started outpatient IV iron infusion 9/18. Next due 9/20. Hb stable at 11.3.  -continue to monitor hemoglobin with daily CBC  -follow up outpatient for continued IV iron infusion therapy     ##ADHD  -continue PTA adderall     Daily cares -   F:NS at 100cc/hr  E:stable  N: Regular diet, PPN  Lines: midline, PIV  Activity:-Up as tolerated  CODE:Full Code  Prophylaxis: Ambulation  PCP communication:  - Esteban Daly  - Contacted at admission: No  - Concerns or updates: none  Dispo: Expected discharge date tomorrow pending clinical improvement and negative blood cultures 9/22/17            Interval History:   Parker Acevedo Sr had no acute overnight events. He remains febrile 100.8 last night with stable vital signs. He is feeling better, but admits to night seats with fever last night. He is frustrated with not pulling the port earlier and was close to leaving AMA this morning. States that he is leaving tomorrow.             Review of Systems:   CONSTITUTIONAL: no fatigue, no unexpected change in weight +night sweats  SKIN: no worrisome rashes or lesions  EYES: no acute vision problems or changes  RESP: no significant cough, no shortness of breath  CV: no chest pain, no new or worsening peripheral edema  PSYCHIATRIC: +anxiety, dysphoric mood, sleep disturbance        Physical Exam (Resident / Clinician):   Vitals were reviewed  Temp: 97.9  F (36.6  C) Temp src: Oral BP: 130/70 Pulse: 99 Heart Rate: 84 Resp: 18 SpO2: 100 % O2  Device: None (Room air)      Constitutional:   awake, alert, cooperative, no apparent distress, and appears stated age     Lungs:   No increased work of breathing, good air exchange, clear to auscultation bilaterally, no crackles or wheezing     Cardiovascular:   Regular rate and rhythm, normal S1 and S2, no S3 or S4, and no murmur noted     Abdomen:   Normal bowel sounds, soft, non-distended, non-tender, no masses palpated     Musculoskeletal:   no lower extremity pitting edema present     Neuropsychiatric:   General: normal, calm and normal eye contact  Level of consciousness: alert / normal  Affect: normal     Skin:   no redness, warmth, or swelling       Intake/Output Summary (Last 24 hours) at 09/22/17 1507  Last data filed at 09/22/17 0806   Gross per 24 hour   Intake              170 ml   Output                0 ml   Net              170 ml           Data:   ROUTINE LABS (Last four results)  [facility]  Recent Labs  Lab 09/22/17  0821 09/21/17  0653 09/20/17  0549 09/19/17  1929  09/15/17  1900    141 139 140  < > 144   POTASSIUM 3.3* 3.7 3.4 3.7  < > 3.0*   CHLORIDE 111* 110* 106 107  < > 112*   CO2 24 26 27 28  < > 24   ANIONGAP 6 4 6 5  < > 8   * 96 100* 98  < > 156*   BUN 7 6* 8 10  < > 15   CR 0.59* 0.72 0.67 0.68  < > 0.66   GFRESTIMATED >90 >90 >90 >90  < > >90   GFRESTBLACK >90 >90 >90 >90  < > >90   SILAS 7.7* 7.8* 7.6* 8.2*  < > 7.9*   MAG 2.0  --   --   --   --  1.8   PHOS 1.6*  --   --   --   --  2.2*   PROTTOTAL 6.1* 6.3* 6.4* 6.9  --  7.1   ALBUMIN 2.9* 2.9* 3.0* 3.2*  --  3.3*   BILITOTAL 0.3 0.4 0.5 0.4  --  0.4   ALKPHOS 87 94 115 122  --  72   AST 31 56* 135* 222*  --  15   ALT 84* 122* 192* 220*  --  23   < > = values in this interval not displayed.  CBC    Recent Labs  Lab 09/22/17  0821 09/21/17  0653 09/20/17  0549 09/19/17  1929   WBC 6.4 5.2 5.3 4.8   RBC 3.67* 3.92* 4.11* 4.20*   HGB 10.6* 11.3* 11.9* 12.3*   HCT 33.2* 36.1* 37.6* 38.4*   MCV 91 92 92 91   MCH 28.9 28.8 29.0 29.3    MCHC 31.9 31.3* 31.6 32.0   RDW 16.6* 16.8* 16.6* 16.6*   * 103* 104* 112*     INR    Recent Labs  Lab 09/22/17  0821 09/19/17 1929   INR 1.07 1.13     CRP    Recent Labs  Lab 09/22/17  0821 09/21/17  0653 09/20/17  0549 09/19/17 1929   CRP 60.0* 88.0* 82.0* 57.0*       Blood culture:  Invalid input(s): BC   Urine culture:  No results for input(s): URC in the last 168 hours.  All cultures:    Recent Labs  Lab 09/21/17 2038 09/21/17 2033 09/21/17  1045 09/21/17  0656 09/21/17  0653 09/20/17  0556 09/20/17  0549 09/19/17 2010 09/19/17 1929 09/19/17  0038 09/19/17  0037   CULT No growth after 9 hours No growth after 9 hours Cultured on the 1st day of incubation:Gram positive cocci in pairs and chains*  Critical Value/Significant Value, preliminary result only, called to and read back byKarine Meyers RN U5B 0400 09.22.17 CF No growth after 22 hours No growth after 22 hours No growth after 2 days No growth after 2 days No growth after 3 days Cultured on the 2nd day of incubation:Streptococcus salivarius groupSpeciation in progressReferred to research section for identificationSusceptibility testing done on previous specimen*  Critical Value/Significant Value, preliminary result only, called to and read back byAnnel Hirsch RN at 0814 on 9.20.17.KD  Cultured on the 1st day of incubation:Streptococcus salivarius groupSpeciation in progressReferred to research section for identification*  Critical Value/Significant Value, preliminary result only, called to and read back byDr. Jimenez, from [facility]. 09.19.17 at 1357. GR. Cultured on the 1st day of incubation:Streptococcus salivarius groupSpeciation in progressReferred to research section for identificationSusceptibility testing done on previous specimen*  Critical Value/Significant Value, preliminary result only, called to and read back byDr. Jimenez from [facility]. 09.19.17 at 1041. GR.  Cultured on the 1st day of incubation:Streptococcus mitis group*  (Note)NEGATIVE  for the following: Staphylococcus spp., Staph aureus, Staphepidermidis, Staph lugdunensis, Streptococcus spp., Strep pneumoniae,Strep pyogenes, Strep agalactiae, Strep anginosus group, Enterococcusfaecalis, Enterococcus faecium, and Listeria spp. by Verigenemultiplex nucleic acid test. Final identification and antimicrobialsusceptibility testing will be verified by standard methods.Critical Value/Significant Value called to and read back by  from [facility]. 09.19.17 at 1357. GR.         Medications:     Current Facility-Administered Medications   Medication     medication instruction     hydrOXYzine (ATARAX) tablet 25 mg     melatonin tablet 3 mg     vancomycin (VANCOCIN) 1,750 mg in NaCl 0.9 % 500 mL intermittent infusion     diphenhydrAMINE (BENADRYL) injection 50 mg     cyanocobalamin (VITAMIN B12) injection 1,000 mcg     lipids (INTRALIPID) 20 % infusion 250 mL     dextrose 10 % 1,000 mL infusion     diazepam (VALIUM) injection 2.5 mg     parenteral nutrition - Clinimix E     acetaminophen (TYLENOL) solution 650 mg     amphetamine-dextroamphetamine (ADDERALL) per tablet 20 mg     anticoagulant citrate flush 5 mL     albuterol (PROAIR HFA/PROVENTIL HFA/VENTOLIN HFA) Inhaler 2 puff     carvedilol (COREG) tablet 6.25 mg     fentaNYL (DURAGESIC) 50 mcg/hr 72 hr patch 1 patch     oxyCODONE (ROXICODONE) solution 10-15 mg     senna-docusate (SENOKOT-S;PERICOLACE) 8.6-50 MG per tablet 1-2 tablet     sucralfate (CARAFATE) suspension 1 g     [START ON 9/24/2017] vitamin D (ERGOCALCIFEROL) capsule 50,000 Units     naloxone (NARCAN) injection 0.1-0.4 mg     lidocaine 1 % 1 mL     lidocaine (LMX4) kit     sodium chloride (PF) 0.9% PF flush 3 mL     sodium chloride (PF) 0.9% PF flush 3 mL     ondansetron (ZOFRAN-ODT) ODT tab 4 mg    Or     ondansetron (ZOFRAN) injection 4 mg     0.9% sodium chloride infusion     fentaNYL (DURAGESIC) patch REMOVAL     fentaNYL (DURAGESIC) Patch in Place       Caring Physician: Anastasiia REEVES  MD Twila  [facility], [facility]  Pager Contact: see Physician sticky note

## 2017-09-22 NOTE — PROCEDURES
Interventional Radiology Brief Post Procedure Note    Procedure: IR PORT REMOVAL RIGHT    Proceduralist: Jhonatan Alejandro PA-C    Assistant: None    Time Out: Prior to the start of the procedure and with procedural staff participation, I verbally confirmed the patient s identity using two indicators, relevant allergies, that the procedure was appropriate and matched the consent or emergent situation, and that the correct equipment/implants were available. Immediately prior to starting the procedure I conducted the Time Out with the procedural staff and re-confirmed the patient s name, procedure, and site/side. (The Joint Commission universal protocol was followed.)  Yes    Sedation: IR Nurse Monitored Care   Post Procedure Summary:  Prior to the start of the procedure and with procedural staff participation, I verbally confirmed the patient s identity using two indicators, relevant allergies, that the procedure was appropriate and matched the consent or emergent situation, and that the correct equipment/implants were available. Immediately prior to starting the procedure I conducted the Time Out with the procedural staff and re-confirmed the patient s name, procedure, and site/side. (The Joint Commission universal protocol was followed.)  Yes       Sedatives: Fentanyl and Midazolam (Versed)    Vital signs, airway and pulse oximetry were monitored and remained stable throughout the procedure and sedation was maintained until the procedure was complete.  The patient was monitored by staff until sedation discharge criteria were met.    Patient tolerance: Patient tolerated the procedure well with no immediate complications.    Time of sedation in minutes: 40 minutes from beginning to end of physician one to one monitoring.    Findings: Completed removal of double lumen right chest port in its entirety. Catheter tip sent for culture.     Estimated Blood Loss: Less than 50 ml    Fluoroscopy Time:  minute(s)    SPECIMENS:  Catheter tip    Complications: 1. None     Condition: Stable    Plan: Follow-up per primary team. We advise against port for TPN infusions due to high infection rate.    Comments: See dictated procedure note for full details.    Jhonatan Alejandro PA-C

## 2017-09-22 NOTE — PLAN OF CARE
Problem: Goal Outcome Summary  Goal: Goal Outcome Summary  Pt alert and oriented. VSS on ra; had lg temp earlier in the night that subsided. Up ad vitor. C/o of h/a, nausea, and abdominal pain overnight; prn oxycodone, Tylenol, and Zofran given with some relief. Valium X1 given for sleep and anxiety aid as well. Chest port positive for gram positive cocci in pairs and chains from sample taken on the 9/21/2017; CC Dr. Walker updated. Writer also communicated to Dr. Walker about whether or not chest port should be utilized at this time; advised to SL and use midline for infusion purposes until primary team and ID communicate to nursing staff it is okay to access. Pt had no dressing to chest port when writer was assessing site. Writer applied tegaderm dressing per policy and educated pt about port maintenance and hygiene. Pt was having pain at midline site when flushing. Writer consulted vascular access RN to assess; Ok to use midline per vascular access RN unless pt states the pain is worsening with infusions. BG spot check overnight at 101. Scheduled Vancomycin administered followed by IVMF infusion. No midline site irritation thus far.  R: Follow up with primary team regarding chest port access. Continue to monitor

## 2017-09-22 NOTE — PROGRESS NOTES
Interventional Radiology Pre-Procedure Sedation Assessment   Time of Assessment: 10:59 AM    Expected Level: Moderate Sedation    Indication: Sedation is required for the following type of Procedure: Port removal    Sedation and procedural consent: Risks, benefits and alternatives were discussed with Patient    PO Intake: Appropriately NPO for procedure    ASA Class: Class 2 - MILD SYSTEMIC DISEASE, NO ACUTE PROBLEMS, NO FUNCTIONAL LIMITATIONS.    Mallampati: Grade 2:  Soft palate, base of uvula, tonsillar pillars, and portion of posterior pharyngeal wall visible    Lungs: Lungs Clear with good breath sounds bilaterally    Heart: Normal heart sounds and rate    History and physical reviewed and no updates needed. I have reviewed the lab findings, diagnostic data, medications, and the plan for sedation. I have determined this patient to be an appropriate candidate for the planned sedation and procedure and have reassessed the patient IMMEDIATELY PRIOR to sedation and procedure.    Reji Hardwick MD

## 2017-09-23 ENCOUNTER — HOSPITAL ENCOUNTER (INPATIENT)
Dept: VASCULAR ULTRASOUND | Facility: CLINIC | Age: 47
DRG: 270 | End: 2017-09-23
Attending: FAMILY MEDICINE
Payer: COMMERCIAL

## 2017-09-23 VITALS
OXYGEN SATURATION: 98 % | WEIGHT: 204 LBS | TEMPERATURE: 98.6 F | DIASTOLIC BLOOD PRESSURE: 74 MMHG | HEIGHT: 71 IN | BODY MASS INDEX: 28.56 KG/M2 | HEART RATE: 85 BPM | SYSTOLIC BLOOD PRESSURE: 120 MMHG | RESPIRATION RATE: 16 BRPM

## 2017-09-23 PROBLEM — T80.211A CATHETER-RELATED BLOODSTREAM INFECTION (CRBSI): Status: ACTIVE | Noted: 2017-09-23

## 2017-09-23 LAB
ALBUMIN SERPL-MCNC: 3 G/DL (ref 3.4–5)
ALP SERPL-CCNC: 85 U/L (ref 40–150)
ALT SERPL W P-5'-P-CCNC: 67 U/L (ref 0–70)
ANION GAP SERPL CALCULATED.3IONS-SCNC: 5 MMOL/L (ref 3–14)
AST SERPL W P-5'-P-CCNC: 19 U/L (ref 0–45)
BASOPHILS # BLD AUTO: 0 10E9/L (ref 0–0.2)
BASOPHILS NFR BLD AUTO: 0.2 %
BILIRUB SERPL-MCNC: 0.4 MG/DL (ref 0.2–1.3)
BUN SERPL-MCNC: 6 MG/DL (ref 7–30)
CALCIUM SERPL-MCNC: 7.8 MG/DL (ref 8.5–10.1)
CHLORIDE SERPL-SCNC: 111 MMOL/L (ref 94–109)
CO2 SERPL-SCNC: 25 MMOL/L (ref 20–32)
CREAT SERPL-MCNC: 0.62 MG/DL (ref 0.66–1.25)
CRP SERPL-MCNC: 48 MG/L (ref 0–8)
DIFFERENTIAL METHOD BLD: ABNORMAL
EOSINOPHIL # BLD AUTO: 0.2 10E9/L (ref 0–0.7)
EOSINOPHIL NFR BLD AUTO: 3.9 %
ERYTHROCYTE [DISTWIDTH] IN BLOOD BY AUTOMATED COUNT: 16.7 % (ref 10–15)
GFR SERPL CREATININE-BSD FRML MDRD: >90 ML/MIN/1.7M2
GLUCOSE BLDC GLUCOMTR-MCNC: 103 MG/DL (ref 70–99)
GLUCOSE BLDC GLUCOMTR-MCNC: 87 MG/DL (ref 70–99)
GLUCOSE SERPL-MCNC: 87 MG/DL (ref 70–99)
HCT VFR BLD AUTO: 33 % (ref 40–53)
HGB BLD-MCNC: 10.5 G/DL (ref 13.3–17.7)
IMM GRANULOCYTES # BLD: 0 10E9/L (ref 0–0.4)
IMM GRANULOCYTES NFR BLD: 0.2 %
LYMPHOCYTES # BLD AUTO: 1.7 10E9/L (ref 0.8–5.3)
LYMPHOCYTES NFR BLD AUTO: 28.1 %
MAGNESIUM SERPL-MCNC: 2.1 MG/DL (ref 1.6–2.3)
MCH RBC QN AUTO: 28.5 PG (ref 26.5–33)
MCHC RBC AUTO-ENTMCNC: 31.8 G/DL (ref 31.5–36.5)
MCV RBC AUTO: 90 FL (ref 78–100)
MONOCYTES # BLD AUTO: 0.7 10E9/L (ref 0–1.3)
MONOCYTES NFR BLD AUTO: 11.7 %
NEUTROPHILS # BLD AUTO: 3.4 10E9/L (ref 1.6–8.3)
NEUTROPHILS NFR BLD AUTO: 55.9 %
NRBC # BLD AUTO: 0 10*3/UL
NRBC BLD AUTO-RTO: 0 /100
PHOSPHATE SERPL-MCNC: 1.6 MG/DL (ref 2.5–4.5)
PLATELET # BLD AUTO: 120 10E9/L (ref 150–450)
POTASSIUM SERPL-SCNC: 3.3 MMOL/L (ref 3.4–5.3)
PROT SERPL-MCNC: 6.3 G/DL (ref 6.8–8.8)
RBC # BLD AUTO: 3.68 10E12/L (ref 4.4–5.9)
SODIUM SERPL-SCNC: 141 MMOL/L (ref 133–144)
WBC # BLD AUTO: 6.1 10E9/L (ref 4–11)

## 2017-09-23 PROCEDURE — 25000128 H RX IP 250 OP 636: Performed by: FAMILY MEDICINE

## 2017-09-23 PROCEDURE — 80053 COMPREHEN METABOLIC PANEL: CPT | Performed by: STUDENT IN AN ORGANIZED HEALTH CARE EDUCATION/TRAINING PROGRAM

## 2017-09-23 PROCEDURE — 83735 ASSAY OF MAGNESIUM: CPT | Performed by: STUDENT IN AN ORGANIZED HEALTH CARE EDUCATION/TRAINING PROGRAM

## 2017-09-23 PROCEDURE — 85025 COMPLETE CBC W/AUTO DIFF WBC: CPT | Performed by: STUDENT IN AN ORGANIZED HEALTH CARE EDUCATION/TRAINING PROGRAM

## 2017-09-23 PROCEDURE — 36415 COLL VENOUS BLD VENIPUNCTURE: CPT | Performed by: STUDENT IN AN ORGANIZED HEALTH CARE EDUCATION/TRAINING PROGRAM

## 2017-09-23 PROCEDURE — 86140 C-REACTIVE PROTEIN: CPT | Performed by: STUDENT IN AN ORGANIZED HEALTH CARE EDUCATION/TRAINING PROGRAM

## 2017-09-23 PROCEDURE — 36569 INSJ PICC 5 YR+ W/O IMAGING: CPT

## 2017-09-23 PROCEDURE — 25000125 ZZHC RX 250: Performed by: FAMILY MEDICINE

## 2017-09-23 PROCEDURE — 00000146 ZZHCL STATISTIC GLUCOSE BY METER IP

## 2017-09-23 PROCEDURE — 40000802 ZZH SITE CHECK

## 2017-09-23 PROCEDURE — 84100 ASSAY OF PHOSPHORUS: CPT | Performed by: STUDENT IN AN ORGANIZED HEALTH CARE EDUCATION/TRAINING PROGRAM

## 2017-09-23 PROCEDURE — C1751 CATH, INF, PER/CENT/MIDLINE: HCPCS

## 2017-09-23 PROCEDURE — 87040 BLOOD CULTURE FOR BACTERIA: CPT | Performed by: FAMILY MEDICINE

## 2017-09-23 PROCEDURE — 80048 BASIC METABOLIC PNL TOTAL CA: CPT | Performed by: STUDENT IN AN ORGANIZED HEALTH CARE EDUCATION/TRAINING PROGRAM

## 2017-09-23 PROCEDURE — 25000132 ZZH RX MED GY IP 250 OP 250 PS 637: Performed by: STUDENT IN AN ORGANIZED HEALTH CARE EDUCATION/TRAINING PROGRAM

## 2017-09-23 RX ORDER — HEPARIN SODIUM,PORCINE 10 UNIT/ML
2-5 VIAL (ML) INTRAVENOUS
Status: DISCONTINUED | OUTPATIENT
Start: 2017-09-23 | End: 2017-09-23 | Stop reason: HOSPADM

## 2017-09-23 RX ORDER — LIDOCAINE 40 MG/G
CREAM TOPICAL
Status: DISCONTINUED | OUTPATIENT
Start: 2017-09-23 | End: 2017-09-23 | Stop reason: HOSPADM

## 2017-09-23 RX ADMIN — CARVEDILOL 6.25 MG: 6.25 TABLET, FILM COATED ORAL at 09:10

## 2017-09-23 RX ADMIN — LIDOCAINE HYDROCHLORIDE 2 ML: 10 INJECTION, SOLUTION EPIDURAL; INFILTRATION; INTRACAUDAL; PERINEURAL at 15:00

## 2017-09-23 RX ADMIN — VANCOMYCIN HYDROCHLORIDE 1750 MG: 10 INJECTION, POWDER, LYOPHILIZED, FOR SOLUTION INTRAVENOUS at 06:08

## 2017-09-23 RX ADMIN — DIPHENHYDRAMINE HYDROCHLORIDE 50 MG: 50 INJECTION, SOLUTION INTRAMUSCULAR; INTRAVENOUS at 05:48

## 2017-09-23 RX ADMIN — SUCRALFATE 1 G: 1 SUSPENSION ORAL at 12:31

## 2017-09-23 RX ADMIN — DEXTROAMPHETAMINE SACCHARATE, AMPHETAMINE ASPARTATE, DEXTROAMPHETAMINE SULFATE AND AMPHETAMINE SULFATE 20 MG: 1.25; 1.25; 1.25; 1.25 TABLET ORAL at 09:10

## 2017-09-23 RX ADMIN — OXYCODONE HYDROCHLORIDE 15 MG: 5 SOLUTION ORAL at 09:11

## 2017-09-23 RX ADMIN — Medication 10 MEQ: at 02:31

## 2017-09-23 RX ADMIN — SUCRALFATE 1 G: 1 SUSPENSION ORAL at 09:10

## 2017-09-23 NOTE — PROGRESS NOTES
Verbal order given to Sandra from [facility] to resume TPN and IV PRN hydration order.     Theresa Pink MD MPH  PGY3- [facility], Family Medicine  Pager- [phone]

## 2017-09-23 NOTE — PLAN OF CARE
"Assumed cares at 0418-1615. A/o x 4. Up IND. Low grade temp during night. Pt went down to smoke several times during shift. C/o pain- PRN Oxy given x 1 as well as PRN Valium given at HS. R Midline red, tender and slightly edematous at site- Per MD Midline should not be used d/t possible infection in midline. Pt was refusing to have PIV placed, however after increased pain and education given to pt r/t poss risks with using midline, pt decided to to have PIV placed- stating \"If it blows, I'm done\". L PIV placed- IV abx given- (Benadryl given 30 min prior to abx).  Currently infusing K+ replacement pt has only allowed 2 bags to be infused in the past 6 hours d/t burning at site per pt- Will inform oncoming nurse if additional bags are needed. BG 87 during night.  ml/hr. Fentanyl patch on L upper arm. + BC reported to writer at previous port site- MD notified. Gtube clamped- Site open to air. Pt continues to refuse TPN and Lipids. P: Cont with POC. Call light within reach. Poss D/c today. Will continue to monitor.     Addendum: Pt only allowed for 20 meq of IV Potassium to be given during night, recheck will be this AM. Depending on results- refer to protocol.   "

## 2017-09-23 NOTE — PLAN OF CARE
Problem: Goal Outcome Summary  Goal: Goal Outcome Summary  Outcome: No Change  Care continued. Vancomycin infusion given late due to patient not available in unit for most of the time. Refused PPN and lipids and he also refused IV insertion as well. Plan was to Insert peripheral line to infuse vancomycin and not use the current midline per ID rec as this might be infected. MD will talk to patient later. Potassium replacement started. Resting as of this time.  P: Continue to administer Abx. Possible DC to home tomorrow.

## 2017-09-23 NOTE — DISCHARGE SUMMARY
Attestation:  This patient has been seen and evaluated by me, Antoine Bonner on 9/23/2017.  I saw and discussed the case with the primary resident and the care team. I agree with the findings and plan in this note. I have reviewed today's vital signs, medications, laboratory results and imaging results.  43861 Inpatient discharge code >30 minutes I spent 40 minutes for this visit, discharge planning, seeing the patient, performing the discharge examination, preparing discharge records and prescriptions.    Antoine Bonner MD  [facility]  Discharge Summary    Parker Acevedo  MRN# [id number]   Age: 46 year old YOB: 1970     Date of Admission:  9/19/2017  Date of Discharge:  9/23/2017  Admitting Physician:  Antoine Bonner MD  Discharge Physician:  Antoine Bonner MD  Contact: [phone]  Discharging Service:  [facility]     Primary Provider:   Esteban Daly  [address] [facility] [place] [address]  [phone]          Discharge Disposition:   Discharged to home           Condition on Discharge:   Discharge condition: Stable   Code status on discharge: Full Code          Admission Diagnoses:   Bacteremia [R78.81]          Principle Discharge Problem:   Catheter-related bloodstream infection (CRBSI)   Non-compliance with hospital treatment          Additional Discharge Problems:     Patient Active Problem List    Diagnosis Date Noted     Catheter-related bloodstream infection (CRBSI) 09/23/2017     Priority: Medium     Low serum iron 09/08/2017     Priority: Medium     Anemia, iron deficiency 09/08/2017     Priority: Medium     Bacteremia 06/29/2017     Priority: Medium     Abnormal echocardiogram 04/11/2017     Priority: Medium     Port or reservoir infection, initial encounter 03/16/2017     Priority: Medium     Status post cervical spinal arthrodesis 02/15/2017      Priority: Medium     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)      Priority: Medium     mild EF ~45% on rest 2/13/17, improves with stressing       Severe malnutrition (H)      Priority: Medium     TPN       Short gut syndrome      Priority: Medium     Anxiety      Priority: Medium     Gastrostomy tube in place (H) 08/09/2016     Priority: Medium     Chronic nausea 05/10/2016     Priority: Medium     Fungemia 04/11/2016     Priority: Medium     Positive blood culture 03/29/2016     Priority: Medium     Insomnia 08/13/2015     Priority: Medium     Chronic pain 07/07/2015     Priority: Medium     Patient is followed by Dr. Milligan for ongoing prescription of pain medication.  All refills should be approved by this provider, or covering partner.    Medication(s): Fentanyl 50 mcg patches every three days/ Liquid oxycodone 5 mg/5ml up to 50 mg daily.   Maximum quantity per month: as per Dr. Milligan through [facility]  Clinic visit frequency required: Q 3 months at Pain Management    Controlled substance agreement on file: Yes       Date(s): Through Pain Management    Pain Clinic evaluation in the past: Yes       Date(s):  Ongoing every three months       Location(s):  Per pain management    DIRE Total Score(s):  No flowsheet data found.    Last [facility] website verification:  Through Pain Management   [email or web address]    Esteban Daly MD             Health Care Home 02/24/2015     Priority: Medium     Status:  Accepted  Care Coordinator:  Melissa Behl BSN, RN, PHN  [facility] Care Coordinator  [phone]     See Letters for [facility] Care Plan  Date:  April 4, 2016            Iron deficiency 05/23/2014     Priority: Medium     Vitamin D deficiency 05/22/2014     Priority: Medium     Former smoker 02/24/2014     Priority: Medium     Bile reflux esophagitis 10/16/2013     Priority: Medium     Constipation 10/01/2013     Priority: Medium     Chronic anxiety 09/25/2013     Priority: Medium     Dysphagia  09/17/2013     Priority: Medium     Weight loss, non-intentional 09/17/2013     Priority: Medium     Malnutrition (H) 09/17/2013     Priority: Medium     Dehydration 08/28/2013     Priority: Medium     Vitamin B12 deficiency without anemia 07/31/2013     Priority: Medium     Diagnosis updated by automated process. Provider to review and confirm.       Thiamine deficiency 07/31/2013     Priority: Medium     ADHD (attention deficit hyperactivity disorder), inattentive type 07/02/2013     Priority: Medium     Anemia 09/20/2012     Priority: Medium     Vomiting 06/28/2012     Priority: Medium     Ulcer (H) 06/22/2012     Priority: Medium     Chronic abdominal pain 06/01/2012     Priority: Medium     Patient is followed by ALEXANDRIA WHITLEY for ongoing prescription of narcotic pain medicine.  Med: liquid oxycodone up to 60 mg per day.   Maximum use per month:   Expected duration: ongoing, hope per surgery that will resolve with upcoming surgery  Narcotic agreement on file: YES  Clinic visit recommended: Q 3 months         CARDIOVASCULAR SCREENING; LDL GOAL LESS THAN 160 10/31/2010     Priority: Medium     Peptic ulcer disease 04/08/2010     Priority: Medium     Gastric bypass status for obesity 04/08/2010     Priority: Medium     Top weight of 492.              Medications Prior to Admission:     Prescriptions Prior to Admission   Medication Sig Dispense Refill Last Dose     Carvedilol (COREG PO) Take 6.25 mg by mouth 2 times daily   9/20/2017 at Unknown time     mupirocin (BACTROBAN) 2 % ointment Apply topically 3 times daily (Patient taking differently: Apply topically 3 times daily as needed ) 30 g 3 Past Month at Unknown time     oxyCODONE (ROXICODONE) 5 MG/5ML solution Take 10-15 mLs (10-15 mg) by mouth every 4 hours as needed for moderate to severe pain Max of 55 mg per day. Fill on/after 9/15/17 not to start till 9/17/17. 1650 mL 0 9/20/2017 at [place] time     fentaNYL (DURAGESIC) 50 mcg/hr 72 hr patch Place 1  "patch onto the skin every 48 hours MYLAN BRAND ONLY. Fill on/after 9/15/17 to start on/after 9/17/17 15 patch 0 9/20/2017 at 1000     ondansetron (ZOFRAN-ODT) 8 MG ODT tab Take 1 tablet (8 mg) by mouth every 8 hours as needed for nausea 90 tablet 3 9/20/2017 at Unknown time     sucralfate (CARAFATE) 1 GM/10ML suspension Take 10 mLs (1 g) by mouth 4 times daily 1200 mL 11 9/20/2017 at Unknown time     vitamin D (ERGOCALCIFEROL) 28757 UNIT capsule Take 1 capsule (50,000 Units) by mouth every 7 days 12 capsule 3 9/17/2017 at Unknown time     [place] HOME INFUSION MANAGED PATIENT Contact [facility] for patient specific medication information at [phone] on admission and discharge from the hospital.  Phones are answered 24 hours a day 7 days a week 365 days a year.    Providers - Choose \"CONTINUE HOME MED (no script)\" at discharge if patient treatment with home infusion will continue.   Past Week at Unknown time     nystatin-triamcinolone (MYCOLOG II) cream Apply topically 2 times daily as needed 60 g 5 Past Week at Unknown time     [place] HOME INFUSION MANAGED PATIENT Contact [facility] for patient specific medication information at [phone] on admission and discharge from the hospital.  Phones are answered 24 hours a day 7 days a week 365 days a year.    Providers - Choose \"CONTINUE HOME MED (no script)\" at discharge if patient treatment with home infusion will continue.   Past Week at Unknown time     cyanocobalamin (VITAMIN B12) 1000 MCG/ML injection Inject 1 mL into the muscle every 30 days   8/5/2017 at Unknown time     naloxone (NARCAN) nasal spray Spray 1 spray (4 mg) into one nostril alternating nostrils as needed for opioid reversal (every 2-3 minutes until assistance arrives.) 0.2 mL 0 Unknown at Unknown time     cyanocobalamin (VITAMIN B12) 1000 MCG/ML injection Inject 1 mL (1,000 mcg) into the muscle every 30 days 1 mL 11 Unknown at Unknown time     lidocaine-prilocaine " "(EMLA) cream Apply topically as needed for moderate pain (Patient taking differently: Apply 1 g topically as needed Applies topically before access to port.) 30 g 11 Unknown at Unknown time     [START ON 10/2/2017] amphetamine-dextroamphetamine (ADDERALL) 20 MG per tablet Take 1 tablet (20 mg) by mouth 2 times daily 60 tablet 0 9/19/2017 at 1000     amphetamine-dextroamphetamine (ADDERALL) 20 MG per tablet Take 1 tablet (20 mg) by mouth 2 times daily 60 tablet 0 Unknown at Unknown time     amphetamine-dextroamphetamine (ADDERALL) 20 MG per tablet Take 1 tablet (20 mg) by mouth 2 times daily 60 tablet 0 Unknown at Unknown time     Needle, Disp, (BD DISP NEEDLES) 27G X 1/2\" MISC 1 Device every 30 days Use for cyanocobalamin injection once q 30 days. 3 each 4 Unknown at Unknown time     morphine 0.1% in intrasite topical gel Apply as needed prior to accessing the port site. 100 g 0 Unknown at Unknown time     albuterol (PROAIR HFA/PROVENTIL HFA/VENTOLIN HFA) 108 (90 BASE) MCG/ACT Inhaler Inhale 2 puffs into the lungs every 4 hours as needed for shortness of breath / dyspnea or wheezing 1 Inhaler 3 More than a month at Unknown time     order for DME Equipment being ordered: Bilateral knee high chronic venous insufficiency stockings--  mild-moderate pressures. 4 each 5 Unknown at Unknown time     order for DME Injection Supplies for Vitamin B12: 3cc syringes w/ 27 gauge needles, 1/2 inch length 12 each 0 Unknown at Unknown time            Discharge Medications:     Current Discharge Medication List      START taking these medications    Details   vancomycin (VANCOCIN) 1750 mg in 0.9% NaCl 500 mL PREMIX Inject 500 mLs (1,750 mg) into the vein every 8 hours for 14 days  Qty: 38361 mL, Refills: 0    Associated Diagnoses: Bacteremia         CONTINUE these medications which have NOT CHANGED    Details   Carvedilol (COREG PO) Take 6.25 mg by mouth 2 times daily      mupirocin (BACTROBAN) 2 % ointment Apply topically 3 times " "daily  Qty: 30 g, Refills: 3    Associated Diagnoses: Skin infection      oxyCODONE (ROXICODONE) 5 MG/5ML solution Take 10-15 mLs (10-15 mg) by mouth every 4 hours as needed for moderate to severe pain Max of 55 mg per day. Fill on/after 9/15/17 not to start till 9/17/17.  Qty: 1650 mL, Refills: 0    Associated Diagnoses: Encounter for long-term opiate analgesic use      fentaNYL (DURAGESIC) 50 mcg/hr 72 hr patch Place 1 patch onto the skin every 48 hours MYLAN BRAND ONLY. Fill on/after 9/15/17 to start on/after 9/17/17  Qty: 15 patch, Refills: 0    Associated Diagnoses: Chronic abdominal pain      ondansetron (ZOFRAN-ODT) 8 MG ODT tab Take 1 tablet (8 mg) by mouth every 8 hours as needed for nausea  Qty: 90 tablet, Refills: 3    Associated Diagnoses: Nausea      sucralfate (CARAFATE) 1 GM/10ML suspension Take 10 mLs (1 g) by mouth 4 times daily  Qty: 1200 mL, Refills: 11    Associated Diagnoses: Nausea      vitamin D (ERGOCALCIFEROL) 23802 UNIT capsule Take 1 capsule (50,000 Units) by mouth every 7 days  Qty: 12 capsule, Refills: 3    Associated Diagnoses: Vitamin D deficiency      !! [place] HOME INFUSION MANAGED PATIENT Contact [facility] for patient specific medication information at [phone] on admission and discharge from the hospital.  Phones are answered 24 hours a day 7 days a week 365 days a year.    Providers - Choose \"CONTINUE HOME MED (no script)\" at discharge if patient treatment with home infusion will continue.    Comments: Continue home infusion  Associated Diagnoses: Severe malnutrition (H)      nystatin-triamcinolone (MYCOLOG II) cream Apply topically 2 times daily as needed  Qty: 60 g, Refills: 5    Associated Diagnoses: Skin irritation      !! [place] HOME INFUSION MANAGED PATIENT Contact [facility] for patient specific medication information at [phone] on admission and discharge from the hospital.  Phones are answered 24 hours a day 7 days a week 365 days " "a year.    Providers - Choose \"CONTINUE HOME MED (no script)\" at discharge if patient treatment with home infusion will continue.    Comments: Resume prior to admission TPN/Lipids/saline  Associated Diagnoses: S/P bariatric surgery      !! cyanocobalamin (VITAMIN B12) 1000 MCG/ML injection Inject 1 mL into the muscle every 30 days      naloxone (NARCAN) nasal spray Spray 1 spray (4 mg) into one nostril alternating nostrils as needed for opioid reversal (every 2-3 minutes until assistance arrives.)  Qty: 0.2 mL, Refills: 0    Associated Diagnoses: Encounter for long-term opiate analgesic use; Chronic abdominal pain      !! cyanocobalamin (VITAMIN B12) 1000 MCG/ML injection Inject 1 mL (1,000 mcg) into the muscle every 30 days  Qty: 1 mL, Refills: 11    Associated Diagnoses: Bariatric surgery status      lidocaine-prilocaine (EMLA) cream Apply topically as needed for moderate pain  Qty: 30 g, Refills: 11    Associated Diagnoses: Pain following surgery or procedure      !! amphetamine-dextroamphetamine (ADDERALL) 20 MG per tablet Take 1 tablet (20 mg) by mouth 2 times daily  Qty: 60 tablet, Refills: 0    Associated Diagnoses: ADHD (attention deficit hyperactivity disorder), inattentive type      !! amphetamine-dextroamphetamine (ADDERALL) 20 MG per tablet Take 1 tablet (20 mg) by mouth 2 times daily  Qty: 60 tablet, Refills: 0    Associated Diagnoses: ADHD (attention deficit hyperactivity disorder), inattentive type      !! amphetamine-dextroamphetamine (ADDERALL) 20 MG per tablet Take 1 tablet (20 mg) by mouth 2 times daily  Qty: 60 tablet, Refills: 0    Associated Diagnoses: ADHD (attention deficit hyperactivity disorder), inattentive type      Needle, Disp, (BD DISP NEEDLES) 27G X 1/2\" MISC 1 Device every 30 days Use for cyanocobalamin injection once q 30 days.  Qty: 3 each, Refills: 4    Associated Diagnoses: Bariatric surgery status      morphine 0.1% in intrasite topical gel Apply as needed prior to accessing the " port site.  Qty: 100 g, Refills: 0    Associated Diagnoses: Pain following surgery or procedure      albuterol (PROAIR HFA/PROVENTIL HFA/VENTOLIN HFA) 108 (90 BASE) MCG/ACT Inhaler Inhale 2 puffs into the lungs every 4 hours as needed for shortness of breath / dyspnea or wheezing  Qty: 1 Inhaler, Refills: 3    Associated Diagnoses: Aspiration pneumonitis (H)      !! order for DME Equipment being ordered: Bilateral knee high chronic venous insufficiency stockings--  mild-moderate pressures.  Qty: 4 each, Refills: 5    Associated Diagnoses: Bilateral edema of lower extremity      !! order for DME Injection Supplies for Vitamin B12: 3cc syringes w/ 27 gauge needles, 1/2 inch length  Qty: 12 each, Refills: 0    Associated Diagnoses: Bariatric surgery status       !! - Potential duplicate medications found. Please discuss with provider.      STOP taking these medications       senna-docusate (SENOKOT-S;PERICOLACE) 8.6-50 MG per tablet Comments:   Reason for Stopping:                    Discharge Instructions and Follow-Up:   Discharge diet: Regular   Discharge activity: Activity as tolerated   Discharge follow-up: Follow up with primary care provider on Monday (09/25)  Follow up with infectious disease as instructed.    Outpatient therapy: [facility]    Home Care agency: [facility]    Supplies and equipment: None   Lines and drains: PICC    Wound care: None   Other instructions: To follow up with PCP as directed.             Brief Admission History and Evaluation:   Parker Acevedo Sr is a 46 year old  male with significant past medical history of gastric bypass (2002) with subsequent development of short gut syndrome now dependent on TPN (2 years) and history of bacteremia secondary to line infections, who presents with fevers, chills and generalized malaise found to have positive Strep salivarius, admitted for management of bacteremia and port removal.           Hospital Course/Discharge Plan by Problem:       ##Fever, chills secondary to Catheter related blood stream infection  Fever and chills secondary to bacteremia with likely source being portacath. He remains hemodynamically stable.Patient has a history of multiple episodes of bacteremia secondary to infected PICC/port. TTE is negative for endocarditis. Patient's hospital course was complicated by patient's non-compliance with treatment. He threatened to leave AMA several times, refused electrolyte replacement, refused PPN and at one point, refused antibiotics via peripheral IV line. The team had to negotiate with patient to stay overnight so that he could get his port removed and get a PICC line placed. Finally he was amenable to this and stayed one more day. His blood cultures from the last 24 hrs remained negative and so we were able to place a PICC line so that patient could get IV antibiotics and TPN. He refused to stay till his cultures and sensitivities were back so that we could discharge him on an easier antibiotic regimen that would require less monitoring. He was discharged on IV vancomycin with plan to follow up with ID in 2 weeks and to follow up with PCP on Monday 9/25/17 to get vancomycin level, CMP, Mg, Phos levels checked.     - Continue IV vancomycin for at least 2 weeks. Further antibiotic duration and type pending sensitivities that will be resulted on Monday (09/25) as per micro lab.   -Follow up with PCP on Monday (9/25/17) to get lab work- vancomycin level, CRP, CBC, CMP, Mg, Phos.   -Follow up with ID in 2 weeks. Patient did not want to wait for us to help him schedule this. Will need to be scheduled outpatient.        ##Elevated LFT's-Improved  AST and ALT both elevated but trending down. Etiology unclear. No prior history of elevated LFTs per chart review. Denies alcohol use. Normal abdominal exam. RUQ ultrasound does not show evidence of steatosis or cirrhosis.       ##h/o gastric bypass  ##short gut  "syndrome  ##malnutrition  ##Hypokalemia, hypophosphatemia   Despite discussing risks of re-feeding syndrome, patient refused replacement of electrolytes saying that he knows how to manage his TPN at home and that \"I'll be fine\".  -restart TPN via PICC line at home with [facility]  -continue PTA Vit B12 and Vit D supplements  -Recommend repeating electrolytes at the PCP office on Monday (09/25)      ##chronic pain syndrome  ##chronic back pain  Follows with [facility]. Last visit 9/18/2017.   -continue PTA oxycodone 10-15mg q4h prn, max 55mg per day  -continue PTA fentanyl 50mcg patch q48hr. Next due change 9/20      ##h/o palpitations, intermittent chest pain  ##h/o decreased LVEF  Echo done 1/16/2016 showed midly reduced LV systolic function (LVEF 45-50%) and mild diffuse hypokinesis. Dobutamine stress echo done 2/13/2017 showed mild cardiomyopathy with good contractile reserve and no inducible ischemia, mild global hypokinesia of left ventricle with LVEF 45-50%. Seen by cardiolology on 7/24/2017 where he was started on Coreg 3.125mg BID. Then later seen by Cardiology at [facility] on 8/31/2017 for history of ongoing palpitations. At that time Coreg was increased from 3.125mg BID to 6.25mg BID and a cardiac event monitor was placed for a 30 day monitoring period.  - continue PTA coreg 6.25mg BID  - continue PTA cardiac event monitor  - Follow up with Cardiology outpatient       ##PUD  - continue PTA carafate       ##Iron Deficiency Anemia  Started outpatient IV iron infusion 9/18. Next due 9/20. Hb stable at 11.3.  -continue to monitor hemoglobin with daily CBC  -follow up outpatient for continued IV iron infusion therapy      ##ADHD  -continue PTA adderall              Final Day of Progress before Discharge:         Interval History:  General:  feels better   Vitals: vitals signs have been stable   Intake/Output: stable intake and output   Nutrition: TPN dependent but does eat some PO.  "   Mental status: alert and oriented   Respiratory: Respiratory status stable   Cardiovascular blood pressure stable and heart rate stable   Renal: stable renal fuction   Neurologic: no focal deficits reported   Medications: Discharging on IV vancomycin   Interventions: PICC   Consults: Consultation received from infectious disease             Physical Exam:  Vitals were reviewed  Temp: 98.6  F (37  C) Temp src: Oral BP: 120/74 Pulse: 85 Heart Rate: 73 Resp: 16 SpO2: 98 % O2 Device: None (Room air)      Constitutional:   awake, alert, cooperative, no apparent distress, and appears stated age     Lungs:   No increased work of breathing, good air exchange, clear to auscultation bilaterally, no crackles or wheezing     Cardiovascular:   Regular rate and rhythm, normal S1 and S2, no S3 or S4, and no murmur noted     Abdomen:   Normal bowel sounds, soft, non-distended, non-tender, no masses palpated, no hepatosplenomegally     Musculoskeletal:   no lower extremity pitting edema present            Data:  All laboratory data reviewed  All cardiac studies reviewed by me.  All imaging studies reviewed by me.         Pending Results:   Blood culture results  Sensitivities and susceptibility testing results      It was a pleasure to participate in the care of Parker Shawn Acevedo Sr.  If you have questions about his care, please do not hesitate to contact the [facility] team via the hospital decker on which we are stationed.      [facility]  [facility], [address] - [phone]

## 2017-09-23 NOTE — PROVIDER NOTIFICATION
MD notified regarding Positive BC reported to writer from lab. + BC were drawn on 9/21 from port. Also pt requested to have his Valium HS dose increased to 5 mg d/t pt home dose.       MD called writer back and stated she will be changing Valium dose. MD was also notified of pt refusing to have PIV placed. Per MD pt Midline should not be used d/t possible infection. Pt refusing to have PIV placed. Writer educated pt on risks of using midline and complications that could include a longer hospital stay as well as infection complications. Pt stated that he understood the risks and was still NOT willing to have PIV placed. Pt will be receiving IV abx as well as IV potassium replacement via PIV. Will continue to monitor and notify MD of any concerns.

## 2017-09-23 NOTE — PLAN OF CARE
Problem: Goal Outcome Summary  Goal: Goal Outcome Summary  Patient had a PICC access placed at IR,anxious to be discharged home,discharge order written,patient would be getting IV vancomycin at home and he's been followed by home infusion nurse.No new prescription to be filled at pharmacy,home IV antibiotic would be supplied by  home infusion.Discharge process completed by the charge nurse on duty. Patient went home at 1530 accompanied by his spouse and  with his belongings.

## 2017-09-23 NOTE — PLAN OF CARE
Problem: Goal Outcome Summary  Goal: Goal Outcome Summary  Patient had  Serum potassium level 3.3 and phosphorus level 1.6,patient declined to have  both oral potassium or IV potassium with lidocaine  Replacement,pt said he would rather eat banana when he gets home,Patient also decline phosphorus replacement..[facility] resident Dr Ellison was informed.about pt refusal of electrolyte replacement.Vascular access nurse contacted for PICC access placement,was told pt  Is on the list for PICC line and would be called as soon as there is an opening.Countinue per plan of care.

## 2017-09-24 LAB
ALBUMIN SERPL-MCNC: 2.9 G/DL (ref 3.4–5)
ALP SERPL-CCNC: 78 U/L (ref 40–150)
ALT SERPL W P-5'-P-CCNC: 49 U/L (ref 0–70)
ANION GAP SERPL CALCULATED.3IONS-SCNC: 7 MMOL/L (ref 3–14)
AST SERPL W P-5'-P-CCNC: 18 U/L (ref 0–45)
BACTERIA SPEC CULT: NO GROWTH
BASOPHILS # BLD AUTO: 0 10E9/L (ref 0–0.2)
BASOPHILS NFR BLD AUTO: 0.3 %
BILIRUB DIRECT SERPL-MCNC: <0.1 MG/DL (ref 0–0.2)
BILIRUB SERPL-MCNC: 0.3 MG/DL (ref 0.2–1.3)
BUN SERPL-MCNC: 7 MG/DL (ref 7–30)
CALCIUM SERPL-MCNC: 7.5 MG/DL (ref 8.5–10.1)
CHLORIDE SERPL-SCNC: 113 MMOL/L (ref 94–109)
CO2 SERPL-SCNC: 25 MMOL/L (ref 20–32)
CREAT SERPL-MCNC: 0.65 MG/DL (ref 0.66–1.25)
CRP SERPL-MCNC: 26.6 MG/L (ref 0–8)
DIFFERENTIAL METHOD BLD: ABNORMAL
EOSINOPHIL # BLD AUTO: 0.3 10E9/L (ref 0–0.7)
EOSINOPHIL NFR BLD AUTO: 5 %
ERYTHROCYTE [DISTWIDTH] IN BLOOD BY AUTOMATED COUNT: 16.6 % (ref 10–15)
ERYTHROCYTE [SEDIMENTATION RATE] IN BLOOD BY WESTERGREN METHOD: 31 MM/H (ref 0–15)
GFR SERPL CREATININE-BSD FRML MDRD: >90 ML/MIN/1.7M2
GLUCOSE SERPL-MCNC: 75 MG/DL (ref 70–99)
HCT VFR BLD AUTO: 31.4 % (ref 40–53)
HGB BLD-MCNC: 10.1 G/DL (ref 13.3–17.7)
IMM GRANULOCYTES # BLD: 0 10E9/L (ref 0–0.4)
IMM GRANULOCYTES NFR BLD: 0.2 %
LYMPHOCYTES # BLD AUTO: 1.8 10E9/L (ref 0.8–5.3)
LYMPHOCYTES NFR BLD AUTO: 31.3 %
MAGNESIUM SERPL-MCNC: 2 MG/DL (ref 1.6–2.3)
MCH RBC QN AUTO: 29.2 PG (ref 26.5–33)
MCHC RBC AUTO-ENTMCNC: 32.2 G/DL (ref 31.5–36.5)
MCV RBC AUTO: 91 FL (ref 78–100)
MONOCYTES # BLD AUTO: 0.7 10E9/L (ref 0–1.3)
MONOCYTES NFR BLD AUTO: 11.9 %
NEUTROPHILS # BLD AUTO: 3 10E9/L (ref 1.6–8.3)
NEUTROPHILS NFR BLD AUTO: 51.3 %
NRBC # BLD AUTO: 0 10*3/UL
NRBC BLD AUTO-RTO: 0 /100
PHOSPHATE SERPL-MCNC: 1.3 MG/DL (ref 2.5–4.5)
PLATELET # BLD AUTO: 127 10E9/L (ref 150–450)
POTASSIUM SERPL-SCNC: 3.7 MMOL/L (ref 3.4–5.3)
PREALB SERPL IA-MCNC: 13 MG/DL (ref 15–45)
PROT SERPL-MCNC: 6.3 G/DL (ref 6.8–8.8)
RBC # BLD AUTO: 3.46 10E12/L (ref 4.4–5.9)
SODIUM SERPL-SCNC: 145 MMOL/L (ref 133–144)
SPECIMEN SOURCE: NORMAL
TRIGL SERPL-MCNC: 89 MG/DL
VANCOMYCIN SERPL-MCNC: 17.6 MG/L
WBC # BLD AUTO: 5.8 10E9/L (ref 4–11)

## 2017-09-24 PROCEDURE — 80202 ASSAY OF VANCOMYCIN: CPT | Performed by: SURGERY

## 2017-09-24 PROCEDURE — 87040 BLOOD CULTURE FOR BACTERIA: CPT | Performed by: SURGERY

## 2017-09-24 PROCEDURE — 84478 ASSAY OF TRIGLYCERIDES: CPT | Performed by: SURGERY

## 2017-09-24 PROCEDURE — 83735 ASSAY OF MAGNESIUM: CPT | Performed by: SURGERY

## 2017-09-24 PROCEDURE — 84100 ASSAY OF PHOSPHORUS: CPT | Performed by: SURGERY

## 2017-09-24 PROCEDURE — 85025 COMPLETE CBC W/AUTO DIFF WBC: CPT | Performed by: SURGERY

## 2017-09-24 PROCEDURE — 86140 C-REACTIVE PROTEIN: CPT | Performed by: SURGERY

## 2017-09-24 PROCEDURE — 85652 RBC SED RATE AUTOMATED: CPT | Performed by: SURGERY

## 2017-09-24 PROCEDURE — 80053 COMPREHEN METABOLIC PANEL: CPT | Performed by: SURGERY

## 2017-09-24 PROCEDURE — 82248 BILIRUBIN DIRECT: CPT | Performed by: SURGERY

## 2017-09-24 PROCEDURE — 84134 ASSAY OF PREALBUMIN: CPT | Performed by: SURGERY

## 2017-09-25 ENCOUNTER — TELEPHONE (OUTPATIENT)
Dept: FAMILY MEDICINE | Facility: CLINIC | Age: 47
End: 2017-09-25

## 2017-09-25 ENCOUNTER — CARE COORDINATION (OUTPATIENT)
Dept: CARE COORDINATION | Facility: CLINIC | Age: 47
End: 2017-09-25

## 2017-09-25 ENCOUNTER — MEDICAL CORRESPONDENCE (OUTPATIENT)
Dept: HEALTH INFORMATION MANAGEMENT | Facility: CLINIC | Age: 47
End: 2017-09-25

## 2017-09-25 ENCOUNTER — MYC MEDICAL ADVICE (OUTPATIENT)
Dept: PALLIATIVE MEDICINE | Facility: CLINIC | Age: 47
End: 2017-09-25

## 2017-09-25 ENCOUNTER — TELEPHONE (OUTPATIENT)
Dept: PHARMACY | Facility: OTHER | Age: 47
End: 2017-09-25

## 2017-09-25 LAB
BACTERIA SPEC CULT: ABNORMAL
BACTERIA SPEC CULT: NO GROWTH
SPECIMEN SOURCE: ABNORMAL
SPECIMEN SOURCE: NORMAL

## 2017-09-25 NOTE — TELEPHONE ENCOUNTER
Telephone call from Melissa Behl RN. Spoke with Lizbeth Lester as well.   Pharmaciust on call Carlita needs orders for dosing IV clindamycin.  Pt discharged to home on vanco. Culture sensitive to clinda.   Had infected PICC and bacteremia.   PICC has been changed.   Per Nando- Advised IV abx for 2 weeks.   Spoke with Carlita - pharmacist- advises Clindamycin IV 900mg q8hrs.  Duration x 2 weeks at this time.   Reviewed comp panel from yesterday normal GFR.  Verbal orders given.  Jessica Wren PA-C

## 2017-09-25 NOTE — TELEPHONE ENCOUNTER
MTM referral from: Transitions of Care (recent hospital discharge or ED visit)    MTM referral outreach attempt #1 on September 25, 2017 at 11:28 AM      Outcome: Left Message    Suki Le MTM Coordinator

## 2017-09-25 NOTE — TELEPHONE ENCOUNTER
Reason for Call:  Other question    Detailed comments: Karlene from [facility] is calling, IV iron plan? Doesn't want to resume at this time until infection is gone are you ok with this plan? Please call Karlene at [phone].    Phone Number Patient can be reached at: Other phone number:  n/a    Best Time: n/a    Can we leave a detailed message on this number? Not Applicable    Call taken on 9/25/2017 at 10:31 AM by Cecilia Duran

## 2017-09-25 NOTE — TELEPHONE ENCOUNTER
I was patient's hospital physician.  He was to follow up with his PCP clinic today.  They will be managing IV antibiotics going forward.

## 2017-09-25 NOTE — PROGRESS NOTES
RN CC attempted to contact VIPIN Snell who was not in clinic with [facility]'s request for PCP to call in clindamycin to [facility] pharmacist Carlita [phone].  CHRISTY JERRY spoke with Jessica Wren PA-C covering who will f/u with VIPIN Snell regarding [facility]'s request for clindamycin to be called in.    Melissa Behl BSN, RN, PHN  [facility] Care Coordinator  [phone]  [email or web address]

## 2017-09-25 NOTE — TELEPHONE ENCOUNTER
RN spoke with Dr. Bonner in clinic who stated he saw pt in hospital and pt needed to follow up with his PCP at [facility] for further orders. RN returned call to ruchi and [facility] to relay. Ruchi verbalized understanding    Katelyn Trinh RN

## 2017-09-25 NOTE — PROGRESS NOTES
Clinic Care Coordination Contact  OUTREACH    Referral Information:  Referral Source: IP/TCU Report  Reason for Contact: RN CC call to patient for hospital f/u  Care Conference: No     Universal Utilization:   ED Visits in last year: 7  Hospital visits in last year: 4  Last PCP appointment: 08/01/17  Missed Appointments: 1  Concerns: yes, high utilization  Multiple Providers or Specialists: yes    Clinical Concerns: Current Medical Concerns: Patient was inpatient at [facility] 9/19/17-9/23/17 for Positive blood culture , Severe malnutrition (H) , Short gut syndrome, Port or reservoir infection, Bacteremia, catheter-related bloodstream infection (CRBSI).  Patient was discharged with blood cultures, sensitivities and susceptibility tests pending.  RN CC spoke with VIPIN Snell with update that patient did not show for his hospital f/u appointment today.  Result note in chart from Dr. Bonner:  Notes Recorded by Antoine Bonner MD on 9/25/2017 at 9:10 AM  Blood cultures returned showing sensitivity to clindamycin.  Currently on Vanco.  Has appointment with PCP clinic today.  Would recommend change to IV clindamycin.  Keep ID appointment for 2 weeks from now.  Antoine Bonner MD, [facility]  and VIPIN Snell requested for writer to contact [facility] in order for patient's Vancomycin to be changed to Clindamycin.  RN CC spoke with [facility] Pharmacist Karlene who contacted Dr. Bonner for orders.  RN CC received a call back from Karlene who informed writer Dr. Bonner is requesting for PCP or covering provider to call in orders.  Karlene asks that the provider call the on-call pharmacist at [facility] Carlita [phone].  RN CC spoke with patient's spouse Rose who reports patient is doing well.  Rose states she did not know about the scheduled appointment today and rescheduled for 10/3/17 and declined to schedule sooner due to her schedule.  Rose was not aware of the infectious disease referral.  Rose  made a goal to schedule this for patient.    Current Behavioral Concerns: Patient with ADHD and anxiety.    Education Provided to patient: RN CC reviewed hospital discharge instructions with spouse as well as appointment follow ups needed.   Clinical Pathway Name: None    Medication Management:  Patient independent in medication management and verbalizes adherence and understanding of medication regimen.  Spouse and FHI involved in infusions.       Functional Status:  Mobility Status: Independent  Equipment Currently Used at Home: cane, straight  Transportation: Patient's spouse provides.           Psychosocial:  Current living arrangement:: I live in a private home with spouse  Financial/Insurance: No concerns at this time.  Patient will be moving into a new house in [place], [place] late October/early November     Resources and Interventions:  Current Resources: Home Care, Home Infusion; Other (see comment) ([facility] and [employer] resolving rental)        Advanced Care Plans/Directives on file:: Yes        Goals:   Goal 1 Statement: I will call to schedule an appointment with infectious disease.  Goal 1 Progression Percent: 0%  Goal 1 Progression Date: 09/25/17              Barriers: mental health, multiple complex medical diagnoses.  Strengths: Has support of spouse, FHI involved.  Patient/Caregiver understanding: Patient's spouse verbalized understanding of discharge instructions once reviewed by writer.  Follow up appointment scheduled with PCP, however, not in the time-frame advised.  Spouse made a goal to schedule an appointment infectious disease for 2 week hospital f/u.  Frequency of Care Coordination: monthly  Upcoming appointment: 10/03/17     Plan:   Patient will continue to follow treatment plan as directed and follow up with PCP with concerns ongoing.   Patient will schedule an appointment with Infectious Disease for 2 week hospital f/u.  RN CC will follow up in 2 weeks.    Melissa Behl  ERIN, RN, PHN  [facility] Care Coordinator  [phone]  [email or web address]

## 2017-09-25 NOTE — TELEPHONE ENCOUNTER
RN returned call to ruchi who stated she cannot choose an order for clindamycin. Ruchi needs a verbal order from Dr. Bonner for dose of IV clindamycin and then she can get it out to the patient    Message routed to Dr. Bonner at high priority    Katelyn Trinh RN

## 2017-09-25 NOTE — TELEPHONE ENCOUNTER
Please call and inform that it is fine to wait with iron infusion till finished with current regimen of antibiotic therapy. Contact clinic when finished with IV antibiotics for updating the order.    Esteban Daly MD  Please close encounter when call/work completed.

## 2017-09-25 NOTE — TELEPHONE ENCOUNTER
MTM referral from: Transitions of Care (recent hospital discharge or ED visit)    MTM referral outreach attempt #2 on September 25, 2017 at 2:54 PM      Outcome: Patient is not interested at this time because patients wife called back to decline apt, will route to MTM Pharmacist/Provider as an FYI. Thank you for the referral.     Suki Le, MTM Coordinator

## 2017-09-25 NOTE — TELEPHONE ENCOUNTER
"[facility] phone call message- patient requesting results:    Test: Lab    Date of test: 09/21/17 (resulted on 9/23/17)    Additional Comments: Nilson from [facility] is calling regarding the above test. She would like to speak with the provider or the nurse in regards to the results. Specifically that they are looking at the same results for the blood culture, per the result note :\"Blood cultures returned showing sensitivity to clindamycin.  Currently on Vanco.  Has appointment with PCP clinic today.  Would recommend change to IV clindamycin.  Keep ID appointment for 2 weeks from now.  Antoine Bonner MD, FAAFP\"  but she states she is not seeing these sensitivities. She would like to discuss the results.      OK to leave a message on voice mail? Yes    Primary language: English      needed? No    Call taken on September 25, 2017 at 3:46 PM by Pinky Mcneill        "

## 2017-09-26 LAB
BACTERIA SPEC CULT: ABNORMAL
BACTERIA SPEC CULT: ABNORMAL
BACTERIA SPEC CULT: NO GROWTH
BACTERIA SPEC CULT: NO GROWTH
SPECIMEN SOURCE: ABNORMAL
SPECIMEN SOURCE: NORMAL
SPECIMEN SOURCE: NORMAL

## 2017-09-26 NOTE — TELEPHONE ENCOUNTER
Spoke with Rose and told her we would not take the antibiotic but did say she could call the clinic where it was prescribed for instruction or speak to the pharmacist about appropriate disposal. I also let her know that often times [facility] will have a medication disposal system in place.    Jyothi Winchester, YADIRAN, RN  Care Coordinator  [facility] [place]

## 2017-09-28 LAB
BACTERIA SPEC CULT: ABNORMAL
BACTERIA SPEC CULT: NO GROWTH
BACTERIA SPEC CULT: NO GROWTH
SPECIMEN SOURCE: ABNORMAL
SPECIMEN SOURCE: ABNORMAL
SPECIMEN SOURCE: NORMAL
SPECIMEN SOURCE: NORMAL

## 2017-09-29 LAB
BACTERIA SPEC CULT: NO GROWTH
BACTERIA SPEC CULT: NO GROWTH
SPECIMEN SOURCE: NORMAL
SPECIMEN SOURCE: NORMAL

## 2017-09-30 LAB
BACTERIA SPEC CULT: NO GROWTH
BACTERIA SPEC CULT: NO GROWTH
Lab: NORMAL
Lab: NORMAL
SPECIMEN SOURCE: NORMAL
SPECIMEN SOURCE: NORMAL

## 2017-10-02 LAB
AST SERPL W P-5'-P-CCNC: 8 U/L (ref 0–45)
BASOPHILS # BLD AUTO: 0 10E9/L (ref 0–0.2)
BASOPHILS NFR BLD AUTO: 0.4 %
BUN SERPL-MCNC: 18 MG/DL (ref 7–30)
CREAT SERPL-MCNC: 0.61 MG/DL (ref 0.66–1.25)
CRP SERPL-MCNC: <2.9 MG/L (ref 0–8)
DIFFERENTIAL METHOD BLD: ABNORMAL
EOSINOPHIL # BLD AUTO: 0.3 10E9/L (ref 0–0.7)
EOSINOPHIL NFR BLD AUTO: 3.5 %
ERYTHROCYTE [DISTWIDTH] IN BLOOD BY AUTOMATED COUNT: 16.9 % (ref 10–15)
GFR SERPL CREATININE-BSD FRML MDRD: >90 ML/MIN/1.7M2
HCT VFR BLD AUTO: 39 % (ref 40–53)
HGB BLD-MCNC: 12.3 G/DL (ref 13.3–17.7)
IMM GRANULOCYTES # BLD: 0 10E9/L (ref 0–0.4)
IMM GRANULOCYTES NFR BLD: 0.1 %
LYMPHOCYTES # BLD AUTO: 1.7 10E9/L (ref 0.8–5.3)
LYMPHOCYTES NFR BLD AUTO: 22.5 %
MCH RBC QN AUTO: 28.9 PG (ref 26.5–33)
MCHC RBC AUTO-ENTMCNC: 31.5 G/DL (ref 31.5–36.5)
MCV RBC AUTO: 92 FL (ref 78–100)
MONOCYTES # BLD AUTO: 0.6 10E9/L (ref 0–1.3)
MONOCYTES NFR BLD AUTO: 8.6 %
NEUTROPHILS # BLD AUTO: 4.8 10E9/L (ref 1.6–8.3)
NEUTROPHILS NFR BLD AUTO: 64.9 %
NRBC # BLD AUTO: 0 10*3/UL
NRBC BLD AUTO-RTO: 0 /100
PLATELET # BLD AUTO: 152 10E9/L (ref 150–450)
RBC # BLD AUTO: 4.25 10E12/L (ref 4.4–5.9)
WBC # BLD AUTO: 7.5 10E9/L (ref 4–11)

## 2017-10-02 PROCEDURE — 85025 COMPLETE CBC W/AUTO DIFF WBC: CPT | Performed by: SURGERY

## 2017-10-02 PROCEDURE — 84450 TRANSFERASE (AST) (SGOT): CPT | Performed by: SURGERY

## 2017-10-02 PROCEDURE — 84100 ASSAY OF PHOSPHORUS: CPT

## 2017-10-02 PROCEDURE — 83735 ASSAY OF MAGNESIUM: CPT

## 2017-10-02 PROCEDURE — 82565 ASSAY OF CREATININE: CPT | Performed by: SURGERY

## 2017-10-02 PROCEDURE — 84520 ASSAY OF UREA NITROGEN: CPT | Mod: 91 | Performed by: SURGERY

## 2017-10-02 PROCEDURE — 80048 BASIC METABOLIC PNL TOTAL CA: CPT

## 2017-10-02 PROCEDURE — 86140 C-REACTIVE PROTEIN: CPT | Performed by: SURGERY

## 2017-10-03 ENCOUNTER — MYC REFILL (OUTPATIENT)
Dept: ENDOCRINOLOGY | Facility: CLINIC | Age: 47
End: 2017-10-03

## 2017-10-03 ENCOUNTER — MYC REFILL (OUTPATIENT)
Dept: FAMILY MEDICINE | Facility: CLINIC | Age: 47
End: 2017-10-03

## 2017-10-03 ENCOUNTER — MYC MEDICAL ADVICE (OUTPATIENT)
Dept: PALLIATIVE MEDICINE | Facility: CLINIC | Age: 47
End: 2017-10-03

## 2017-10-03 DIAGNOSIS — R10.9 CHRONIC ABDOMINAL PAIN: ICD-10-CM

## 2017-10-03 DIAGNOSIS — G89.18 PAIN FOLLOWING SURGERY OR PROCEDURE: ICD-10-CM

## 2017-10-03 DIAGNOSIS — Z98.84 BARIATRIC SURGERY STATUS: ICD-10-CM

## 2017-10-03 DIAGNOSIS — Z79.891 ENCOUNTER FOR LONG-TERM OPIATE ANALGESIC USE: ICD-10-CM

## 2017-10-03 DIAGNOSIS — E55.9 VITAMIN D DEFICIENCY: ICD-10-CM

## 2017-10-03 DIAGNOSIS — G89.29 CHRONIC ABDOMINAL PAIN: ICD-10-CM

## 2017-10-03 DIAGNOSIS — R11.0 NAUSEA: ICD-10-CM

## 2017-10-03 DIAGNOSIS — J69.0 ASPIRATION PNEUMONITIS (H): ICD-10-CM

## 2017-10-03 LAB
ANION GAP SERPL CALCULATED.3IONS-SCNC: 12 MMOL/L (ref 3–14)
BUN SERPL-MCNC: 18 MG/DL (ref 7–30)
CALCIUM SERPL-MCNC: 8.1 MG/DL (ref 8.5–10.1)
CHLORIDE SERPL-SCNC: 110 MMOL/L (ref 94–109)
CO2 SERPL-SCNC: 22 MMOL/L (ref 20–32)
CREAT SERPL-MCNC: 0.62 MG/DL (ref 0.66–1.25)
GFR SERPL CREATININE-BSD FRML MDRD: >90 ML/MIN/1.7M2
GLUCOSE SERPL-MCNC: 77 MG/DL (ref 70–99)
MAGNESIUM SERPL-MCNC: 2.2 MG/DL (ref 1.6–2.3)
PHOSPHATE SERPL-MCNC: 2.8 MG/DL (ref 2.5–4.5)
POTASSIUM SERPL-SCNC: 4.4 MMOL/L (ref 3.4–5.3)
SODIUM SERPL-SCNC: 144 MMOL/L (ref 133–144)

## 2017-10-03 RX ORDER — CYANOCOBALAMIN 1000 UG/ML
1 INJECTION, SOLUTION INTRAMUSCULAR; SUBCUTANEOUS
Qty: 1 ML | Refills: 11 | Status: SHIPPED | OUTPATIENT
Start: 2017-10-03 | End: 2017-11-03

## 2017-10-03 RX ORDER — ONDANSETRON 8 MG/1
8 TABLET, ORALLY DISINTEGRATING ORAL EVERY 8 HOURS PRN
Qty: 90 TABLET | Refills: 3 | Status: SHIPPED | OUTPATIENT
Start: 2017-10-03 | End: 2017-11-03

## 2017-10-03 RX ORDER — LIDOCAINE/PRILOCAINE 2.5 %-2.5%
CREAM (GRAM) TOPICAL PRN
Qty: 30 G | Refills: 11 | Status: SHIPPED | OUTPATIENT
Start: 2017-10-03 | End: 2017-11-03

## 2017-10-03 RX ORDER — ERGOCALCIFEROL 1.25 MG/1
50000 CAPSULE, LIQUID FILLED ORAL
Qty: 12 CAPSULE | Refills: 3 | Status: SHIPPED | OUTPATIENT
Start: 2017-10-03 | End: 2018-10-26

## 2017-10-03 RX ORDER — ALBUTEROL SULFATE 90 UG/1
2 AEROSOL, METERED RESPIRATORY (INHALATION) EVERY 4 HOURS PRN
Qty: 1 INHALER | Refills: 3 | Status: SHIPPED | OUTPATIENT
Start: 2017-10-03 | End: 2018-05-03

## 2017-10-03 RX ORDER — SUCRALFATE ORAL 1 G/10ML
1 SUSPENSION ORAL 4 TIMES DAILY
Qty: 1200 ML | Refills: 11 | Status: SHIPPED | OUTPATIENT
Start: 2017-10-03 | End: 2017-11-03

## 2017-10-03 NOTE — TELEPHONE ENCOUNTER
"Message from Chris:  PoolLeah rodriguez LPLATISHA Chanmichael Oct 3, 2017 3:23 PM        ----- Message -----   From: Parker Acevedo Sr   Sent: 10/3/2017 3:16 PM   To: [facility]es-  Subject: Medication Renewal Request     Original authorizing provider: BRITTANY Guerrero Sr would like a refill of the following medications:  Needle, Disp, (BD DISP NEEDLES) 27G X 1/2\" MISC [Anita Méndez PA-C]    Preferred pharmacy: [facility] - [place], [place] - [address] [facility]    Comment:  We will also  the adderal all on Oct 6 2017 if you have any questions feel free to call thank you    Medication renewals requested in this message routed to other providers:  albuterol (PROAIR HFA/PROVENTIL HFA/VENTOLIN HFA) 108 (90 BASE) MCG/ACT Inhaler [Esteban Daly MD]  vitamin D (ERGOCALCIFEROL) 31220 UNIT capsule [Esteban Daly MD]  sucralfate (CARAFATE) 1 GM/10ML suspension [Esteban Daly MD]  ondansetron (ZOFRAN-ODT) 8 MG ODT tab [Esteban Daly MD]  cyanocobalamin (VITAMIN B12) 1000 MCG/ML injection [Esteban Daly MD]  lidocaine-prilocaine (EMLA) cream [Esteban Daly MD]  "

## 2017-10-03 NOTE — TELEPHONE ENCOUNTER
"Message from [facility]:  Original authorizing provider: MD Parker Camara  would like a refill of the following medications:  albuterol (PROAIR HFA/PROVENTIL HFA/VENTOLIN HFA) 108 (90 BASE) MCG/ACT Inhaler [Esteban Daly MD]  vitamin D (ERGOCALCIFEROL) 56368 UNIT capsule [Esteban Daly MD]  sucralfate (CARAFATE) 1 GM/10ML suspension [Esteban Daly MD]  ondansetron (ZOFRAN-ODT) 8 MG ODT tab [Esteban Daly MD]  cyanocobalamin (VITAMIN B12) 1000 MCG/ML injection [Esteban Daly MD]  lidocaine-prilocaine (EMLA) cream [Esteban Daly MD]    Preferred pharmacy: [address]    Comment:  We will also  the adderal all on Oct 6 2017 if you have any questions feel free to call thank you    Medication renewals requested in this message routed to other providers:  Needle, Disp, (BD DISP NEEDLES) 27G X 1/2\" MISC [Anita Méndez PA-C]  "

## 2017-10-04 NOTE — PROGRESS NOTES
This is a recent snapshot of the patient's [facility] medical record.  For current drug dose and complete information and questions, call [phone] or In [facility] pool, [facility]v home infusion ([id number])  CSN Number:  [id number]

## 2017-10-05 RX ORDER — FENTANYL 50 UG/1
1 PATCH TRANSDERMAL
Qty: 15 PATCH | Refills: 0 | Status: SHIPPED | OUTPATIENT
Start: 2017-10-05 | End: 2017-11-06

## 2017-10-05 RX ORDER — OXYCODONE HCL 5 MG/5 ML
10-15 SOLUTION, ORAL ORAL EVERY 4 HOURS PRN
Qty: 1650 ML | Refills: 0 | Status: SHIPPED | OUTPATIENT
Start: 2017-10-05 | End: 2017-11-06

## 2017-10-05 NOTE — TELEPHONE ENCOUNTER
Medication refill information reviewed.     Due date for oxyCODONE (ROXICODONE) 5 MG/5ML solution   AND fentaNYL (DURAGESIC) 50 mcg/hr 72 hr patch is 10/18/17     Prescriptions prepped for review.     Will route to provider.

## 2017-10-05 NOTE — TELEPHONE ENCOUNTER
Received call from patient requesting refill(s) of oxyCODONE (ROXICODONE) 5 MG/5ML solution   AND fentaNYL (DURAGESIC) 50 mcg/hr 72 hr patch    Last picked up from pharmacy on 9/18/17    Pt last seen by prescribing provider on 9/18/17  Next appt scheduled for none    Last urine drug screen date 4/5/17  Current opioid agreement on file (completed within the last year) Yes Date of opioid agreement: 11/14/16    Mail to [address] [address]  Phone: [phone] Fax: [phone]    Corky Chatman MA  [facility]    Will route to nursing pool for review and preparation of prescription(s).

## 2017-10-05 NOTE — TELEPHONE ENCOUNTER
Signed Prescriptions:                        Disp   Refills    oxyCODONE (ROXICODONE) 5 MG/5ML solution   1650 mL0        Sig: Take 10-15 mLs (10-15 mg) by mouth every 4 hours as           needed for moderate to severe pain Max of 55 mg           per day. Fill on/after 10/16/17 not to start           untill 10/18/17.  Authorizing Provider: BRANDYN JENKINS    fentaNYL (DURAGESIC) 50 mcg/hr 72 hr patch 15 pat*0        Sig: Place 1 patch onto the skin every 48 hours MYLAN           BRAND ONLY. Fill on/after 10/16/17 to start           on/after 10/18/17  Authorizing Provider: BRANDYN JENKINS MD  [facility]

## 2017-10-05 NOTE — TELEPHONE ENCOUNTER
Signed Rx prepped for mail. Will mail from [facility] on 10/6/17. Mailing to [facility], [place].     Lyndsay Solo MA

## 2017-10-06 NOTE — PROGRESS NOTES
This is a recent snapshot of the patient's [facility] medical record.  For current drug dose and complete information and questions, call [phone] or In Basket pool, fv home infusion ([id number])  CSN Number:  [id number]

## 2017-10-06 NOTE — PROGRESS NOTES
This is a recent snapshot of the patient's [facility] medical record.  For current drug dose and complete information and questions, call [phone] or In Basket pool, fv home infusion ([id number])  CSN Number:  [id number]

## 2017-10-06 NOTE — PROGRESS NOTES
This is a recent snapshot of the patient's [facility] medical record.  For current drug dose and complete information and questions, call [phone] or In Basket pool, fv home infusion ([id number])  CSN Number:  [id number]

## 2017-10-08 LAB
AST SERPL W P-5'-P-CCNC: 17 U/L (ref 0–45)
BASOPHILS # BLD AUTO: 0 10E9/L (ref 0–0.2)
BASOPHILS NFR BLD AUTO: 0.8 %
BUN SERPL-MCNC: 19 MG/DL (ref 7–30)
CREAT SERPL-MCNC: 0.72 MG/DL (ref 0.66–1.25)
CRP SERPL-MCNC: <2.9 MG/L (ref 0–8)
DIFFERENTIAL METHOD BLD: ABNORMAL
EOSINOPHIL # BLD AUTO: 0.2 10E9/L (ref 0–0.7)
EOSINOPHIL NFR BLD AUTO: 4.5 %
ERYTHROCYTE [DISTWIDTH] IN BLOOD BY AUTOMATED COUNT: 16.9 % (ref 10–15)
GFR SERPL CREATININE-BSD FRML MDRD: >90 ML/MIN/1.7M2
HCT VFR BLD AUTO: 38 % (ref 40–53)
HGB BLD-MCNC: 12.2 G/DL (ref 13.3–17.7)
IMM GRANULOCYTES # BLD: 0 10E9/L (ref 0–0.4)
IMM GRANULOCYTES NFR BLD: 0.2 %
LYMPHOCYTES # BLD AUTO: 1.6 10E9/L (ref 0.8–5.3)
LYMPHOCYTES NFR BLD AUTO: 30.4 %
MCH RBC QN AUTO: 29.3 PG (ref 26.5–33)
MCHC RBC AUTO-ENTMCNC: 32.1 G/DL (ref 31.5–36.5)
MCV RBC AUTO: 91 FL (ref 78–100)
MONOCYTES # BLD AUTO: 0.6 10E9/L (ref 0–1.3)
MONOCYTES NFR BLD AUTO: 11.8 %
NEUTROPHILS # BLD AUTO: 2.7 10E9/L (ref 1.6–8.3)
NEUTROPHILS NFR BLD AUTO: 52.3 %
NRBC # BLD AUTO: 0 10*3/UL
NRBC BLD AUTO-RTO: 0 /100
PLATELET # BLD AUTO: 130 10E9/L (ref 150–450)
RBC # BLD AUTO: 4.16 10E12/L (ref 4.4–5.9)
WBC # BLD AUTO: 5.2 10E9/L (ref 4–11)

## 2017-10-08 PROCEDURE — 86140 C-REACTIVE PROTEIN: CPT | Performed by: SURGERY

## 2017-10-08 PROCEDURE — 85025 COMPLETE CBC W/AUTO DIFF WBC: CPT | Performed by: SURGERY

## 2017-10-08 PROCEDURE — 84520 ASSAY OF UREA NITROGEN: CPT | Performed by: SURGERY

## 2017-10-08 PROCEDURE — 84450 TRANSFERASE (AST) (SGOT): CPT | Performed by: SURGERY

## 2017-10-08 PROCEDURE — 82565 ASSAY OF CREATININE: CPT | Performed by: SURGERY

## 2017-10-10 ENCOUNTER — TELEPHONE (OUTPATIENT)
Dept: FAMILY MEDICINE | Facility: CLINIC | Age: 47
End: 2017-10-10

## 2017-10-10 NOTE — TELEPHONE ENCOUNTER
Please contact [facility] and informed that 10 complete days of IV clindamycin would be recommended provided the patient is asymptomatic with no evidence of ongoing infection.    Esteban Daly MD  Please close encounter when call/work completed.

## 2017-10-10 NOTE — TELEPHONE ENCOUNTER
Antibiotic therapy should be considered complete after the treatment today which is then 14 days. Clindamycin should be discontinued after today.    Esteban Daly MD  Please close encounter when call/work completed.

## 2017-10-10 NOTE — TELEPHONE ENCOUNTER
Gay informed of message.  She states pt has already had 14 days of IV clindamycin.  Today is the last day of that treatment.  She wants to know if the Provider wants the treatment to continue?  For how long?    The note below was not clear enough for her - please advise.

## 2017-10-10 NOTE — TELEPHONE ENCOUNTER
Gay from [facility] called asking what the length of therapy for patient's IV Clindamycin is? Please contact to clarify/ provide information - [phone]

## 2017-10-12 ENCOUNTER — TELEPHONE (OUTPATIENT)
Dept: FAMILY MEDICINE | Facility: CLINIC | Age: 47
End: 2017-10-12

## 2017-10-12 NOTE — TELEPHONE ENCOUNTER
Reason for call:  Form  Reason for Call:  Form, our goal is to have forms completed with 72 hours, however, some forms may require a visit or additional information.    Type of letter, form or note:  medical    Who is the form from?: [facility]    Where did the form come from: form was faxed in    What clinic location was the form placed at?: [facility] - [phone]    Where the form was placed:  in box     What number is listed as a contact on the form?: [phone]       Additional comments: Fax to [phone]    Call taken on 11/8/2016 at 3:08 PM by Natalie Mc

## 2017-10-16 ENCOUNTER — CARE COORDINATION (OUTPATIENT)
Dept: SURGERY | Facility: CLINIC | Age: 47
End: 2017-10-16

## 2017-10-16 NOTE — PROGRESS NOTES
Called [facility] back. Due to patient lumen clotting frequently, they requested low dose heparin flush. Ok to order.

## 2017-10-17 LAB
ALBUMIN SERPL-MCNC: 3.5 G/DL (ref 3.4–5)
ALP SERPL-CCNC: 82 U/L (ref 40–150)
ALT SERPL W P-5'-P-CCNC: 24 U/L (ref 0–70)
ANION GAP SERPL CALCULATED.3IONS-SCNC: 7 MMOL/L (ref 3–14)
AST SERPL W P-5'-P-CCNC: 23 U/L (ref 0–45)
BASOPHILS # BLD AUTO: 0 10E9/L (ref 0–0.2)
BASOPHILS NFR BLD AUTO: 0.4 %
BILIRUB DIRECT SERPL-MCNC: 0.1 MG/DL (ref 0–0.2)
BILIRUB SERPL-MCNC: 0.4 MG/DL (ref 0.2–1.3)
BUN SERPL-MCNC: 15 MG/DL (ref 7–30)
CALCIUM SERPL-MCNC: 8.6 MG/DL (ref 8.5–10.1)
CHLORIDE SERPL-SCNC: 109 MMOL/L (ref 94–109)
CO2 SERPL-SCNC: 27 MMOL/L (ref 20–32)
CREAT SERPL-MCNC: 0.68 MG/DL (ref 0.66–1.25)
DIFFERENTIAL METHOD BLD: ABNORMAL
EOSINOPHIL # BLD AUTO: 0.3 10E9/L (ref 0–0.7)
EOSINOPHIL NFR BLD AUTO: 4.6 %
ERYTHROCYTE [DISTWIDTH] IN BLOOD BY AUTOMATED COUNT: 16.1 % (ref 10–15)
GFR SERPL CREATININE-BSD FRML MDRD: >90 ML/MIN/1.7M2
GLUCOSE SERPL-MCNC: 90 MG/DL (ref 70–99)
HCT VFR BLD AUTO: 41.4 % (ref 40–53)
HGB BLD-MCNC: 13.1 G/DL (ref 13.3–17.7)
IMM GRANULOCYTES # BLD: 0 10E9/L (ref 0–0.4)
IMM GRANULOCYTES NFR BLD: 0.1 %
LYMPHOCYTES # BLD AUTO: 2.1 10E9/L (ref 0.8–5.3)
LYMPHOCYTES NFR BLD AUTO: 30.5 %
MAGNESIUM SERPL-MCNC: 2 MG/DL (ref 1.6–2.3)
MCH RBC QN AUTO: 29.6 PG (ref 26.5–33)
MCHC RBC AUTO-ENTMCNC: 31.6 G/DL (ref 31.5–36.5)
MCV RBC AUTO: 94 FL (ref 78–100)
MONOCYTES # BLD AUTO: 0.9 10E9/L (ref 0–1.3)
MONOCYTES NFR BLD AUTO: 12.7 %
NEUTROPHILS # BLD AUTO: 3.6 10E9/L (ref 1.6–8.3)
NEUTROPHILS NFR BLD AUTO: 51.7 %
NRBC # BLD AUTO: 0 10*3/UL
NRBC BLD AUTO-RTO: 0 /100
PHOSPHATE SERPL-MCNC: 3.5 MG/DL (ref 2.5–4.5)
PLATELET # BLD AUTO: 142 10E9/L (ref 150–450)
POTASSIUM SERPL-SCNC: 3.9 MMOL/L (ref 3.4–5.3)
PREALB SERPL IA-MCNC: 30 MG/DL (ref 15–45)
PROT SERPL-MCNC: 7.2 G/DL (ref 6.8–8.8)
RBC # BLD AUTO: 4.43 10E12/L (ref 4.4–5.9)
SODIUM SERPL-SCNC: 143 MMOL/L (ref 133–144)
TRIGL SERPL-MCNC: 66 MG/DL
WBC # BLD AUTO: 6.9 10E9/L (ref 4–11)

## 2017-10-17 PROCEDURE — 85025 COMPLETE CBC W/AUTO DIFF WBC: CPT | Performed by: SURGERY

## 2017-10-17 PROCEDURE — 82248 BILIRUBIN DIRECT: CPT | Performed by: SURGERY

## 2017-10-17 PROCEDURE — 80053 COMPREHEN METABOLIC PANEL: CPT | Performed by: SURGERY

## 2017-10-17 PROCEDURE — 84478 ASSAY OF TRIGLYCERIDES: CPT | Performed by: SURGERY

## 2017-10-17 PROCEDURE — 84134 ASSAY OF PREALBUMIN: CPT | Performed by: SURGERY

## 2017-10-17 PROCEDURE — 84100 ASSAY OF PHOSPHORUS: CPT | Performed by: SURGERY

## 2017-10-17 PROCEDURE — 83735 ASSAY OF MAGNESIUM: CPT | Performed by: SURGERY

## 2017-10-19 ASSESSMENT — ENCOUNTER SYMPTOMS
BRUISES/BLEEDS EASILY: 0
CONFUSION: 0
NECK STIFFNESS: 0
HEADACHES: 0
CHEST TIGHTNESS: 0
NECK PAIN: 0
PALPITATIONS: 0
DIZZINESS: 0

## 2017-10-23 NOTE — PROGRESS NOTES
This is a recent snapshot of the patient's [facility] medical record.  For current drug dose and complete information and questions, call [phone] or In Basket pool, fv home infusion ([id number])  CSN Number:  [id number]

## 2017-10-23 NOTE — PROGRESS NOTES
This is a recent snapshot of the patient's [facility] medical record.  For current drug dose and complete information and questions, call [phone] or In Basket pool, fv home infusion ([id number])  CSN Number:  [id number]

## 2017-10-25 ENCOUNTER — CARE COORDINATION (OUTPATIENT)
Dept: CARE COORDINATION | Facility: CLINIC | Age: 47
End: 2017-10-25

## 2017-10-25 NOTE — PROGRESS NOTES
Clinic Care Coordination Contact  OUTREACH    Referral Information:  Referral Source: IP/TCU Report  Reason for Contact: RN CC call to patient's spouse for follow up  Care Conference: No     Universal Utilization:   ED Visits in last year: 7  Hospital visits in last year: 4  Last PCP appointment: 08/01/17  Missed Appointments: 1  Concerns: yes, high utilization  Multiple Providers or Specialists: yes    Clinical Concerns:  Current Medical Concerns: Patient was inpatient at [facility] 9/19/17-9/23/17 for Positive blood culture , Severe malnutrition (H) , Short gut syndrome.  Patient was instructed to f/u with PCP that following Monday and Infectious Disease in 2 weeks.  Patient's spouse has not scheduled any f/u appointments at this time.  Spouse reports patient is free of signs/symptoms of infection and is now on heparin flushes for his PICC line which was experiencing occlusions.  RN CC assisted spouse in scheduling PCP f/u for 11/6/17.  Spouse states they are anticipating moving 11/3/17 to their new home in [place], [place].  Spouse states [facility] will be able to provide services to their new address.     Current Behavioral Concerns: ADHD and anxiety    Education Provided to patient: RN CLARITZA reviewed with spouse the importance of follow up with providers.   Clinical Pathway Name: None  Clinical Pathway:     Medication Management:  Patient's spouse administers patient's TPN and IV hydration.  Patient independent in medication management and verbalizes adherence and understanding of medication regimen.       Functional Status:  Mobility Status: Independent  Equipment Currently Used at Home: cane, straight  Transportation: Spouse provides.           Psychosocial:  Current living arrangement:: I live in a private home with spouse  Financial/Insurance: Denies concerns at this time.  Moving to [place], [place] on 11/3/17.       Resources and Interventions:  Current Resources: Home Care, Home Infusion; Other (see comment)  ([facility] and [facility] and [employer] resolving rental)        Advanced Care Plans/Directives on file:: Yes        Goals:   Goal 1 Statement: I will call to schedule an appointment with infectious disease.  Goal 1 Progression Percent: 0%  Goal 1 Progression Date: 10/25/17              Barriers: upcoming move, multiple chronic health issues  Strengths: spouse support, engaged with care coordination.  Patient/Caregiver understanding: Spouse verbalizes understanding of needing f/u appointments and was able to schedule f/u with PCP for 11/6/17, but states she will schedule f/u with infectious disease following their move.  Frequency of Care Coordination: monthly  Upcoming appointment: 11/06/17     Plan:   Patient will see PCP as scheduled 11/6/17.  Spouse will call to schedule appointment with infectious disease for patient.  RN CC will follow up with patient and spouse in 1 month.    Melissa Behl BSN, RN, PHN  [facility] Care Coordinator  [phone]  [email or web address]

## 2017-10-25 NOTE — PROGRESS NOTES
This is a recent snapshot of the patient's [facility] medical record.  For current drug dose and complete information and questions, call [phone] or In Basket pool, fv home infusion ([id number])  CSN Number:  [id number]

## 2017-10-26 NOTE — PROGRESS NOTES
This is a recent snapshot of the patient's [facility] medical record.  For current drug dose and complete information and questions, call [phone] or In Basket pool, fv home infusion ([id number])  CSN Number:  [id number]

## 2017-10-27 ENCOUNTER — MEDICAL CORRESPONDENCE (OUTPATIENT)
Dept: HEALTH INFORMATION MANAGEMENT | Facility: CLINIC | Age: 47
End: 2017-10-27

## 2017-10-31 NOTE — PROGRESS NOTES
SUBJECTIVE:                                                    Parker Acevedo Sr is a 46 year old male who presents to clinic today for the following health issues:    HPI    Medication Follow up of Adderall --    Continues on Adderall 20 mg twice daily for ADHD. Reports improvement in focusing and completion of tasks. Still has complaints of anxiety and some sleep disorders. Is currently off all benzodiazepines due to her narcotic use. Have discussed possibly decreasing the dose of Adderall in the future. Patient is reluctant to consider that at the present time. He has no palpitations. 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 No tics and no palpitations.      Taking Medication as prescribed: yes    Side Effects:  None    Medication Helping Symptoms:  yes         Problem list and histories reviewed & adjusted, as indicated.  Additional history: as documented            ROS:  C: NEGATIVE for fever, chills, change in weight  CONSTITUTIONAL: Relatively stable weight. Is on TPN through a PICC line. Will be having a port placed in the future. No ongoing bacteremia.  I: NEGATIVE for worrisome rashes, moles or lesions  E: NEGATIVE for vision changes or irritation  E/M: NEGATIVE for ear, mouth and throat problems  R: NEGATIVE for significant cough or SOB  RESP: Stable respiratory status. Reducing tobacco use.  B: NEGATIVE for masses, tenderness or discharge  CV: NEGATIVE for chest pain, palpitations or peripheral edema  CV: Stable cardiomyopathy, on Coreg 12.5 mg twice daily. Is followed by a cardiologist at [facility]. No recent respiratory changes.  GI: Chronic GI problems, post gastric bypass surgery. He has had a total gastrectomy and is on TPN. Continues with chronic abdominal pain, followed by pain management with the ongoing use of liquid oxycodone and fentanyl patch.  : NEGATIVE for frequency, dysuria, or hematuria  M: NEGATIVE for significant arthralgias or myalgia  N: NEGATIVE  "for weakness, dizziness or paresthesias  E: NEGATIVE for temperature intolerance, skin/hair changes  H: NEGATIVE for bleeding problems. Has had 2 iron infusions and feels well. No side effects or complications when they were administered.  P: NEGATIVE for changes in mood or affect  PSYCHIATRIC: Reports chronic anxiety. Is off any benzodiazepines due to the narcotic use. Discussed possible use of SSRI therapy. Anxiety exacerbation pain very well be short-lived during the move to a different home. Would also suggest reducing the dose of Adderall.    OBJECTIVE:                                                    /64  Pulse 100  Temp 99.3  F (37.4  C) (Temporal)  Resp 16  Ht 5' 11\" (1.803 m)  Wt 176 lb 1.6 oz (79.9 kg)  SpO2 98%  BMI 24.56 kg/m2  Body mass index is 24.56 kg/(m^2).  GENERAL: alert, no distress and cooperative  EYES: Eyes grossly normal to inspection, extraocular movements - intact, and PERRL  HENT: ear canals- normal; TMs- normal; Nose- normal; Mouth- no ulcers, no lesions  NECK: no tenderness, no adenopathy, no asymmetry, no masses, no stiffness; thyroid- normal to palpation  NECK: No carotid bruits  RESP: lungs clear to auscultation - no rales, no rhonchi, no wheezes  CV: regular rates and rhythm, normal S1 S2, no S3 or S4 and no murmur, no click or rub -  ABDOMEN: soft, no tenderness, no  hepatosplenomegaly, no masses, normal bowel sounds  MS: extremities- no gross deformities noted, no edema  MS: PICC line in the right upper extremity  SKIN: no suspicious lesions, no rashes  NEURO: strength and tone- normal, sensory exam- grossly normal, mentation- intact, speech- normal, reflexes- symmetric  PSYCH: Alert and oriented times 3; speech- coherent , normal rate and volume; able to articulate logical thoughts, able to abstract reason, no tangential thoughts, no hallucinations or delusions, affect- normal  LYMPHATICS: ant. cervical- normal, post. cervical- normal, axillary- normal, " supraclavicular- normal, inguinal- normal    Diagnostic test results:  Diagnostic Test Results:  none        ASSESSMENT/PLAN:                                                    1. ADHD (attention deficit hyperactivity disorder), inattentive type   Refill ADHD medications for 3 months. Follow-up in 3 months. Continue to suggest reduction of dosage to improve anxiety.  - amphetamine-dextroamphetamine (ADDERALL) 20 MG per tablet; Take 1 tablet (20 mg) by mouth 2 times daily  Dispense: 60 tablet; Refill: 0  - amphetamine-dextroamphetamine (ADDERALL) 20 MG per tablet; Take 1 tablet (20 mg) by mouth 2 times daily  Dispense: 60 tablet; Refill: 0  - amphetamine-dextroamphetamine (ADDERALL) 20 MG per tablet; Take 1 tablet (20 mg) by mouth 2 times daily  Dispense: 60 tablet; Refill: 0    2. Other iron deficiency anemia  Stable post iron infusion. Continue to monitor levels.    3. Anxiety  As noted above, no benzodiazepines given. Offered SSRI therapy but the patient declined.    4. 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      Follow up with Provider - Follow-up in 3 months for ongoing Adderall prescriptions.   See Patient Instructions    The patient understood the rational for the diagnosis and treatment plan. All questions were answered to best of my ability and the patient's satisfaction.    Note: Chart documentation done in part with Dragon Voice Recognition software. Although reviewed after completion, some word and grammatical errors may remain.  Please consider this when interpreting information in this chart.      Esteban Daly MD  [facility]

## 2017-11-02 ENCOUNTER — TELEPHONE (OUTPATIENT)
Dept: PHARMACY | Facility: CLINIC | Age: 47
End: 2017-11-02

## 2017-11-02 NOTE — TELEPHONE ENCOUNTER
Prior Authorization Infusion/Clinic Administered Request    Location: In[facility]afer  Diagnosis and ICD: D50.9, Z98.84

## 2017-11-03 ENCOUNTER — MYC MEDICAL ADVICE (OUTPATIENT)
Dept: PALLIATIVE MEDICINE | Facility: CLINIC | Age: 47
End: 2017-11-03

## 2017-11-03 ENCOUNTER — MYC REFILL (OUTPATIENT)
Dept: FAMILY MEDICINE | Facility: CLINIC | Age: 47
End: 2017-11-03

## 2017-11-03 DIAGNOSIS — R23.8 SKIN IRRITATION: ICD-10-CM

## 2017-11-03 DIAGNOSIS — R11.0 NAUSEA: ICD-10-CM

## 2017-11-03 DIAGNOSIS — R10.9 CHRONIC ABDOMINAL PAIN: ICD-10-CM

## 2017-11-03 DIAGNOSIS — G89.18 PAIN FOLLOWING SURGERY OR PROCEDURE: ICD-10-CM

## 2017-11-03 DIAGNOSIS — G89.29 CHRONIC ABDOMINAL PAIN: ICD-10-CM

## 2017-11-03 DIAGNOSIS — L08.9 SKIN INFECTION: ICD-10-CM

## 2017-11-03 DIAGNOSIS — Z79.891 ENCOUNTER FOR LONG-TERM OPIATE ANALGESIC USE: ICD-10-CM

## 2017-11-03 DIAGNOSIS — Z98.84 BARIATRIC SURGERY STATUS: ICD-10-CM

## 2017-11-03 DIAGNOSIS — F90.0 ADHD (ATTENTION DEFICIT HYPERACTIVITY DISORDER), INATTENTIVE TYPE: ICD-10-CM

## 2017-11-03 RX ORDER — DEXTROAMPHETAMINE SACCHARATE, AMPHETAMINE ASPARTATE, DEXTROAMPHETAMINE SULFATE AND AMPHETAMINE SULFATE 5; 5; 5; 5 MG/1; MG/1; MG/1; MG/1
20 TABLET ORAL 2 TIMES DAILY
Qty: 60 TABLET | Refills: 0 | Status: SHIPPED | OUTPATIENT
Start: 2018-01-03 | End: 2017-11-06

## 2017-11-03 RX ORDER — NYSTATIN AND TRIAMCINOLONE ACETONIDE 100000; 1 [USP'U]/G; MG/G
CREAM TOPICAL 2 TIMES DAILY PRN
Qty: 60 G | Refills: 5 | Status: SHIPPED | OUTPATIENT
Start: 2017-11-03 | End: 2018-04-04

## 2017-11-03 RX ORDER — SUCRALFATE ORAL 1 G/10ML
1 SUSPENSION ORAL 4 TIMES DAILY
Qty: 1200 ML | Refills: 11 | Status: SHIPPED | OUTPATIENT
Start: 2017-11-03 | End: 2018-04-04

## 2017-11-03 RX ORDER — MUPIROCIN 20 MG/G
OINTMENT TOPICAL 3 TIMES DAILY
Qty: 30 G | Refills: 3 | Status: SHIPPED | OUTPATIENT
Start: 2017-11-03 | End: 2018-04-04

## 2017-11-03 RX ORDER — DEXTROAMPHETAMINE SACCHARATE, AMPHETAMINE ASPARTATE, DEXTROAMPHETAMINE SULFATE AND AMPHETAMINE SULFATE 5; 5; 5; 5 MG/1; MG/1; MG/1; MG/1
20 TABLET ORAL 2 TIMES DAILY
Qty: 60 TABLET | Refills: 0 | Status: SHIPPED | OUTPATIENT
Start: 2017-11-03 | End: 2017-11-06

## 2017-11-03 RX ORDER — ONDANSETRON 8 MG/1
8 TABLET, ORALLY DISINTEGRATING ORAL EVERY 8 HOURS PRN
Qty: 90 TABLET | Refills: 3 | Status: SHIPPED | OUTPATIENT
Start: 2017-11-03 | End: 2018-04-04

## 2017-11-03 RX ORDER — LIDOCAINE/PRILOCAINE 2.5 %-2.5%
CREAM (GRAM) TOPICAL PRN
Qty: 30 G | Refills: 11 | Status: SHIPPED | OUTPATIENT
Start: 2017-11-03 | End: 2018-06-01

## 2017-11-03 RX ORDER — CYANOCOBALAMIN 1000 UG/ML
1 INJECTION, SOLUTION INTRAMUSCULAR; SUBCUTANEOUS
Qty: 1 ML | Refills: 11 | Status: SHIPPED | OUTPATIENT
Start: 2017-11-03 | End: 2018-04-04

## 2017-11-03 RX ORDER — DEXTROAMPHETAMINE SACCHARATE, AMPHETAMINE ASPARTATE, DEXTROAMPHETAMINE SULFATE AND AMPHETAMINE SULFATE 5; 5; 5; 5 MG/1; MG/1; MG/1; MG/1
20 TABLET ORAL 2 TIMES DAILY
Qty: 60 TABLET | Refills: 0 | Status: SHIPPED | OUTPATIENT
Start: 2017-12-04 | End: 2017-11-06

## 2017-11-03 NOTE — TELEPHONE ENCOUNTER
"Message from MyChart:  Original authorizing provider: MD Parker Camara  would like a refill of the following medications:  nystatin-triamcinolone (MYCOLOG II) cream [Esteban Daly MD]  amphetamine-dextroamphetamine (ADDERALL) 20 MG per tablet [Esteban Daly MD]  mupirocin (BACTROBAN) 2 % ointment [Esteban Daly MD]  Needle, Disp, (BD DISP NEEDLES) 27G X 1/2\" MISC [Esteban Daly MD]  sucralfate (CARAFATE) 1 GM/10ML suspension [Esteban Daly MD]  ondansetron (ZOFRAN-ODT) 8 MG ODT tab [Esteban Daly MD]  cyanocobalamin (VITAMIN B12) 1000 MCG/ML injection [Esteban Daly MD]  lidocaine-prilocaine (EMLA) cream [Esteban Daly MD]    Preferred pharmacy: [facility] [place], [place] - [address]    Comment:    "

## 2017-11-03 NOTE — TELEPHONE ENCOUNTER
amphetamine-dextroamphetamine (ADDERALL) 20 MG per tablet      Last Written Prescription Date:  10/2/2017  Last Fill Quantity: 60,   # refills: 0  LOV - 8/1/2017  Future Office visit:    Next 5 appointments (look out 90 days)     Nov 06, 2017  1:20 PM CST   Office Visit with Esteban Daly MD   [facility]    [address]  [place] [address]   [phone]            Jan 03, 2018  2:00 PM CST   Return Visit with Patti Milligan MD   [facility] [place] ([facility])    [address]  [place] [id number] [phone]                   Routing refill request to provider for review/approval because:  Drug not on the [facility], [facility] or  Health refill protocol or controlled substance

## 2017-11-03 NOTE — TELEPHONE ENCOUNTER
Refill of Adderall completed for 3 months. Pickup available.    Esteban Daly MD  Please close encounter when call/work completed.

## 2017-11-06 ENCOUNTER — OFFICE VISIT (OUTPATIENT)
Dept: FAMILY MEDICINE | Facility: CLINIC | Age: 47
End: 2017-11-06
Payer: COMMERCIAL

## 2017-11-06 ENCOUNTER — MYC MEDICAL ADVICE (OUTPATIENT)
Dept: FAMILY MEDICINE | Facility: CLINIC | Age: 47
End: 2017-11-06

## 2017-11-06 VITALS
OXYGEN SATURATION: 98 % | BODY MASS INDEX: 24.65 KG/M2 | DIASTOLIC BLOOD PRESSURE: 64 MMHG | WEIGHT: 176.1 LBS | HEART RATE: 100 BPM | HEIGHT: 71 IN | SYSTOLIC BLOOD PRESSURE: 110 MMHG | RESPIRATION RATE: 16 BRPM | TEMPERATURE: 99.3 F

## 2017-11-06 DIAGNOSIS — I43: ICD-10-CM

## 2017-11-06 DIAGNOSIS — F41.9 ANXIETY: ICD-10-CM

## 2017-11-06 DIAGNOSIS — F90.0 ADHD (ATTENTION DEFICIT HYPERACTIVITY DISORDER), INATTENTIVE TYPE: ICD-10-CM

## 2017-11-06 DIAGNOSIS — E63.9: ICD-10-CM

## 2017-11-06 DIAGNOSIS — D50.8 OTHER IRON DEFICIENCY ANEMIA: Primary | ICD-10-CM

## 2017-11-06 PROCEDURE — 99214 OFFICE O/P EST MOD 30 MIN: CPT | Performed by: FAMILY MEDICINE

## 2017-11-06 RX ORDER — DEXTROAMPHETAMINE SACCHARATE, AMPHETAMINE ASPARTATE, DEXTROAMPHETAMINE SULFATE AND AMPHETAMINE SULFATE 5; 5; 5; 5 MG/1; MG/1; MG/1; MG/1
20 TABLET ORAL 2 TIMES DAILY
Qty: 60 TABLET | Refills: 0 | Status: CANCELLED | OUTPATIENT
Start: 2017-11-06

## 2017-11-06 RX ORDER — DEXTROAMPHETAMINE SACCHARATE, AMPHETAMINE ASPARTATE, DEXTROAMPHETAMINE SULFATE AND AMPHETAMINE SULFATE 5; 5; 5; 5 MG/1; MG/1; MG/1; MG/1
20 TABLET ORAL 2 TIMES DAILY
Qty: 60 TABLET | Refills: 0 | Status: SHIPPED | OUTPATIENT
Start: 2018-01-05 | End: 2018-01-30

## 2017-11-06 RX ORDER — DIPHENOXYLATE HYDROCHLORIDE AND ATROPINE SULFATE 2.5; .025 MG/1; MG/1
1 TABLET ORAL DAILY
COMMUNITY
Start: 2017-08-31 | End: 2018-06-01

## 2017-11-06 RX ORDER — DEXTROAMPHETAMINE SACCHARATE, AMPHETAMINE ASPARTATE, DEXTROAMPHETAMINE SULFATE AND AMPHETAMINE SULFATE 5; 5; 5; 5 MG/1; MG/1; MG/1; MG/1
20 TABLET ORAL 2 TIMES DAILY
Qty: 60 TABLET | Refills: 0 | Status: SHIPPED | OUTPATIENT
Start: 2017-11-06 | End: 2018-01-30

## 2017-11-06 RX ORDER — DEXTROAMPHETAMINE SACCHARATE, AMPHETAMINE ASPARTATE, DEXTROAMPHETAMINE SULFATE AND AMPHETAMINE SULFATE 5; 5; 5; 5 MG/1; MG/1; MG/1; MG/1
20 TABLET ORAL 2 TIMES DAILY
Qty: 60 TABLET | Refills: 0 | Status: SHIPPED | OUTPATIENT
Start: 2017-12-06 | End: 2018-01-30

## 2017-11-06 ASSESSMENT — PAIN SCALES - GENERAL: PAINLEVEL: NO PAIN (0)

## 2017-11-06 NOTE — TELEPHONE ENCOUNTER
Received call from patient requesting refill(s) of oxyCODONE (ROXICODONE) 5 MG/5ML solution             fentaNYL (DURAGESIC) 50 mcg/hr 72 hr patch    Last picked up from pharmacy on : both on 10/16/17    Pt last seen by prescribing provider on 09/18/17  Next appt scheduled for 01/03/18    Last urine drug screen date 04/05/17  Current opioid agreement on file (completed within the last year) No Date of opioid agreement: 11/04/16    Processing (pick one and delete the others):      Mail to [place] pharmacy   [address] [address]    Will route to nursing pool for review and preparation of prescription(s).

## 2017-11-06 NOTE — TELEPHONE ENCOUNTER
Prior Authorization Retail Medication Request  Medication/Dose: brand - amphetamine-dextroamphetamine (ADDERALL) 20 MG per tablet    Insurance ID (if provided):  [id number]   Insurance Phone (if provided):  [phone]

## 2017-11-06 NOTE — MR AVS SNAPSHOT
After Visit Summary   11/6/2017    Parker Acevedo     MRN: [id number]           Patient Information     Date Of Birth          1970        Visit Information        Provider Department      11/6/2017 1:20 PM Esteban Daly MD [facility]        Today's Diagnoses     ADHD (attention deficit hyperactivity disorder), inattentive type          Care Instructions    These are new changes to your current plan of care based on today's visit:    Medications stopped    Medications to be started    Change dose of this medication None   New treatments        Follow up appointments:    1.  FOLLOW UP WITH YOUR PRIMARY CARE PROVIDER: 3 month(s) for clinic visit     2. FOLLOW UP WITH SPECIALIST: As planned    Esteban Daly MD                Follow-ups after your visit        Follow-up notes from your care team     Return in about 3 months (around 2/6/2018) for Routine Visit.      Your next 10 appointments already scheduled     Jan 03, 2018  2:00 PM CST   Return Visit with Patti Milligan MD   [facility] ([facility])    [address] [address] [phone]              Who to contact     If you have questions or need follow up information about today's clinic visit or your schedule please contact [facility] directly at [phone].  Normal or non-critical lab and imaging results will be communicated to you by MyChart, letter or phone within 4 business days after the clinic has received the results. If you do not hear from us within 7 days, please contact the clinic through MyChart or phone. If you have a critical or abnormal lab result, we will notify you by phone as soon as possible.  Submit refill requests through [facility] or call your pharmacy and they will forward the refill request to us. Please allow 3 business days for your refill to be completed.          Additional Information About Your Visit    "     MyChart Information     [facility] gives you secure access to your electronic health record. If you see a primary care provider, you can also send messages to your care team and make appointments. If you have questions, please call your primary care clinic.  If you do not have a primary care provider, please call [phone] and they will assist you.        Care EveryWhere ID     This is your Care EveryWhere ID. This could be used by other organizations to access your [facility] medical records  [id number]        Your Vitals Were     Pulse Temperature Respirations Height Pulse Oximetry BMI (Body Mass Index)    100 99.3  F (37.4  C) (Temporal) 16 5' 11\" (1.803 m) 98% 24.56 kg/m2       Blood Pressure from Last 3 Encounters:   11/06/17 110/64   09/23/17 120/74   09/19/17 123/77    Weight from Last 3 Encounters:   11/06/17 176 lb 1.6 oz (79.9 kg)   09/21/17 204 lb (92.5 kg)   09/18/17 200 lb (90.7 kg)              Today, you had the following     No orders found for display         Where to get your medicines      Some of these will need a paper prescription and others can be bought over the counter.  Ask your nurse if you have questions.     Bring a paper prescription for each of these medications     amphetamine-dextroamphetamine 20 MG per tablet    amphetamine-dextroamphetamine 20 MG per tablet    amphetamine-dextroamphetamine 20 MG per tablet          Primary Care Provider Office Phone # Fax #    Esteban Daly [phone] [address] [address]        Equal Access to Services     [facility]: Hadii aad ku hadasho Soomaali, waaxda luqadaha, qaybta kaalmada adeegyada, waxay idiin hayaan adeeg kharash la'aan . So [facility] [phone].    ATENCIÓN: Si habla español, tiene a hicks disposición servicios gratuitos de asistencia lingüística. Llame al [phone].    We comply with applicable federal civil rights laws and [place] laws. We do not discriminate on the basis of " race, color, national origin, age, disability, sex, sexual orientation, or gender identity.            Thank you!     Thank you for choosing [facility]  for your care. Our goal is always to provide you with excellent care. Hearing back from our patients is one way we can continue to improve our services. Please take a few minutes to complete the written survey that you may receive in the mail after your visit with us. Thank you!             Your Updated Medication List - Protect others around you: Learn how to safely use, store and throw away your medicines at www.disposemymeds.org.          This list is accurate as of: 11/6/17  1:43 PM.  Always use your most recent med list.                   Brand Name Dispense Instructions for use Diagnosis    albuterol 108 (90 BASE) MCG/ACT Inhaler    PROAIR HFA/PROVENTIL HFA/VENTOLIN HFA    1 Inhaler    Inhale 2 puffs into the lungs every 4 hours as needed for shortness of breath / dyspnea or wheezing    Aspiration pneumonitis (H)       * amphetamine-dextroamphetamine 20 MG per tablet    ADDERALL    60 tablet    Take 1 tablet (20 mg) by mouth 2 times daily    ADHD (attention deficit hyperactivity disorder), inattentive type       * amphetamine-dextroamphetamine 20 MG per tablet   Start taking on:  12/6/2017    ADDERALL    60 tablet    Take 1 tablet (20 mg) by mouth 2 times daily    ADHD (attention deficit hyperactivity disorder), inattentive type       * amphetamine-dextroamphetamine 20 MG per tablet   Start taking on:  1/5/2018    ADDERALL    60 tablet    Take 1 tablet (20 mg) by mouth 2 times daily    ADHD (attention deficit hyperactivity disorder), inattentive type       COREG PO      Take 12.5 mg by mouth 2 times daily        * cyanocobalamin 1000 MCG/ML injection    VITAMIN B12     Inject 1 mL into the muscle every 30 days        * cyanocobalamin 1000 MCG/ML injection    VITAMIN B12    1 mL    Inject 1 mL (1,000 mcg) into the muscle every 30 days    Bariatric  "surgery status       * [facility] PATIENT      Contact [facility] for patient specific medication information at [phone] on admission and discharge from the hospital.  Phones are answered 24 hours a day 7 days a week 365 days a year.  Providers - Choose \"CONTINUE HOME MED (no script)\" at discharge if patient treatment with home infusion will continue.    S/P bariatric surgery       * [facility] PATIENT      Contact [facility] for patient specific medication information at [phone] on admission and discharge from the hospital.  Phones are answered 24 hours a day 7 days a week 365 days a year.  Providers - Choose \"CONTINUE HOME MED (no script)\" at discharge if patient treatment with home infusion will continue.    Severe malnutrition (H)       fentaNYL 50 mcg/hr 72 hr patch    DURAGESIC    15 patch    Place 1 patch onto the skin every 48 hours MYLAN BRAND ONLY. Fill on/after 10/16/17 to start on/after 10/18/17    Chronic abdominal pain       lidocaine-prilocaine cream    EMLA    30 g    Apply topically as needed for moderate pain    Pain following surgery or procedure       morphine 0.1% in intrasite topical gel     100 g    Apply as needed prior to accessing the port site.    Pain following surgery or procedure       MULTI-VITAMINS Tabs      Take 1 tablet by mouth        mupirocin 2 % ointment    BACTROBAN    30 g    Apply topically 3 times daily    Skin infection       naloxone nasal spray    NARCAN    0.2 mL    Spray 1 spray (4 mg) into one nostril alternating nostrils as needed for opioid reversal (every 2-3 minutes until assistance arrives.)    Encounter for long-term opiate analgesic use, Chronic abdominal pain       Needle (Disp) 27G X 1/2\" Misc    BD DISP NEEDLES    3 each    1 Device every 30 days Use for cyanocobalamin injection once q 30 days.    Bariatric surgery status       nystatin-triamcinolone cream    MYCOLOG II    60 g    " Apply topically 2 times daily as needed    Skin irritation       ondansetron 8 MG ODT tab    ZOFRAN-ODT    90 tablet    Take 1 tablet (8 mg) by mouth every 8 hours as needed for nausea    Nausea       * order for DME     12 each    Injection Supplies for Vitamin B12: 3cc syringes w/ 27 gauge needles, 1/2 inch length    Bariatric surgery status       * order for DME     4 each    Equipment being ordered: Bilateral knee high chronic venous insufficiency stockings--  mild-moderate pressures.    Bilateral edema of lower extremity       oxyCODONE 5 MG/5ML solution    ROXICODONE    1650 mL    Take 10-15 mLs (10-15 mg) by mouth every 4 hours as needed for moderate to severe pain Max of 55 mg per day. Fill on/after 10/16/17 not to start untill 10/18/17.    Encounter for long-term opiate analgesic use       sucralfate 1 GM/10ML suspension    CARAFATE    1200 mL    Take 10 mLs (1 g) by mouth 4 times daily    Nausea       vitamin D 77699 UNIT capsule    ERGOCALCIFEROL    12 capsule    Take 1 capsule (50,000 Units) by mouth every 7 days    Vitamin D deficiency       * Notice:  This list has 9 medication(s) that are the same as other medications prescribed for you. Read the directions carefully, and ask your doctor or other care provider to review them with you.

## 2017-11-06 NOTE — NURSING NOTE
"Chief Complaint   Patient presents with     A.D.H.D     Panel Management     honoring choices, lipid       Initial /64  Pulse 100  Temp 99.3  F (37.4  C) (Temporal)  Resp 16  Ht 5' 11\" (1.803 m)  Wt 176 lb 1.6 oz (79.9 kg)  SpO2 98%  BMI 24.56 kg/m2 Estimated body mass index is 24.56 kg/(m^2) as calculated from the following:    Height as of this encounter: 5' 11\" (1.803 m).    Weight as of this encounter: 176 lb 1.6 oz (79.9 kg).  Medication Reconciliation: complete    "

## 2017-11-06 NOTE — TELEPHONE ENCOUNTER
Medication refill information reviewed.     Due date for oxyCODONE (ROXICODONE) 5 MG/5ML solution is 11/17/17                       fentaNYL (DURAGESIC) 50 mcg/hr 72 hr patch is 11/17/17     Prescriptions prepped for review.     Will route to provider.

## 2017-11-06 NOTE — PATIENT INSTRUCTIONS
These are new changes to your current plan of care based on today's visit:    Medications stopped    Medications to be started    Change dose of this medication None   New treatments        Follow up appointments:    1.  FOLLOW UP WITH YOUR PRIMARY CARE PROVIDER: 3 month(s) for clinic visit     2. FOLLOW UP WITH SPECIALIST: As planned    Esteban Daly MD

## 2017-11-07 RX ORDER — OXYCODONE HCL 5 MG/5 ML
10-15 SOLUTION, ORAL ORAL EVERY 4 HOURS PRN
Qty: 1650 ML | Refills: 0 | Status: SHIPPED | OUTPATIENT
Start: 2017-11-07 | End: 2017-12-05

## 2017-11-07 RX ORDER — FENTANYL 50 UG/1
1 PATCH TRANSDERMAL
Qty: 15 PATCH | Refills: 0 | Status: SHIPPED | OUTPATIENT
Start: 2017-11-07 | End: 2017-12-05

## 2017-11-07 NOTE — TELEPHONE ENCOUNTER
Scripts mailed to pharmacy. Patient notified.    YADIRA LazarN, RN  Care Coordinator  [facility]

## 2017-11-07 NOTE — TELEPHONE ENCOUNTER
Called multiple numbers to locate exactly which insurance claim company to do PA through - [phone] [facility]  Rep stated that the pt just filled the Rx on 11/6/2017 and is allowed to fill the Rx again on 12/6/2017.  There is a brand penalty for brand name adderall at $314.    Provider can complete a letter of medical necessity AND an exception form and fax to insurance in effort to adjust the cost.  Fax # [phone]    (Per Provider - past PA documentation -  patient has had a gastrectomy and adderall is best tolerated due to this.)

## 2017-11-08 ENCOUNTER — HOME INFUSION (PRE-WILLOW HOME INFUSION) (OUTPATIENT)
Dept: PHARMACY | Facility: CLINIC | Age: 47
End: 2017-11-08

## 2017-11-08 NOTE — TELEPHONE ENCOUNTER
Reason for brand adderall: pt has no stomach (gastrectomy).  Swallowing whole pills limits the patient's ability to absorb the full pill in the stomach/small intestine.  He can not fully absorb the medicine in tablet form with generic adderall.  The brand adderall actually dissolves in patient's mouth and then allows him to absorb fully once swallowed.    Faxed PA   Awaiting decision

## 2017-11-14 ENCOUNTER — HOME INFUSION (PRE-WILLOW HOME INFUSION) (OUTPATIENT)
Dept: PHARMACY | Facility: CLINIC | Age: 47
End: 2017-11-14

## 2017-11-14 NOTE — PROGRESS NOTES
This is a recent snapshot of the patient's [facility] medical record.  For current drug dose and complete information and questions, call [phone] or In Basket pool, fv home infusion ([id number])  CSN Number:  [id number]

## 2017-11-14 NOTE — PROGRESS NOTES
This is a recent snapshot of the patient's [facility] medical record.  For current drug dose and complete information and questions, call [phone] or In Basket pool, fv home infusion ([id number])  CSN Number:  [id number]

## 2017-11-15 ENCOUNTER — TELEPHONE (OUTPATIENT)
Dept: FAMILY MEDICINE | Facility: CLINIC | Age: 47
End: 2017-11-15

## 2017-11-15 NOTE — PROGRESS NOTES
This is a recent snapshot of the patient's [facility] medical record.  For current drug dose and complete information and questions, call [phone] or In Basket pool, fv home infusion ([id number])  CSN Number:  [id number]

## 2017-11-15 NOTE — TELEPHONE ENCOUNTER
Reason for call:  Form  Reason for Call:  Form, our goal is to have forms completed with 72 hours, however, some forms may require a visit or additional information.    Type of letter, form or note:  Medical    Who is the form from?:  [facility]     Where did the form come from: form was faxed in    What clinic location was the form placed at?: [facility] - [phone]    Where the form was placed: 's in box     What number is listed as a contact on the form?: [phone]       Additional comments: Fax back to [phone]    Call taken on 11/15/2017 at 1:07 PM by Sesar Willett

## 2017-11-16 ENCOUNTER — HOME INFUSION (PRE-WILLOW HOME INFUSION) (OUTPATIENT)
Dept: PHARMACY | Facility: CLINIC | Age: 47
End: 2017-11-16

## 2017-11-16 LAB
ALBUMIN SERPL-MCNC: 3.5 G/DL (ref 3.4–5)
ALP SERPL-CCNC: 84 U/L (ref 40–150)
ALT SERPL W P-5'-P-CCNC: 31 U/L (ref 0–70)
ANION GAP SERPL CALCULATED.3IONS-SCNC: 3 MMOL/L (ref 3–14)
AST SERPL W P-5'-P-CCNC: 18 U/L (ref 0–45)
BASOPHILS # BLD AUTO: 0 10E9/L (ref 0–0.2)
BASOPHILS NFR BLD AUTO: 0.2 %
BILIRUB DIRECT SERPL-MCNC: <0.1 MG/DL (ref 0–0.2)
BILIRUB SERPL-MCNC: 0.4 MG/DL (ref 0.2–1.3)
BUN SERPL-MCNC: 17 MG/DL (ref 7–30)
CALCIUM SERPL-MCNC: 8 MG/DL (ref 8.5–10.1)
CHLORIDE SERPL-SCNC: 111 MMOL/L (ref 94–109)
CO2 SERPL-SCNC: 30 MMOL/L (ref 20–32)
CREAT SERPL-MCNC: 0.7 MG/DL (ref 0.66–1.25)
DIFFERENTIAL METHOD BLD: ABNORMAL
EOSINOPHIL # BLD AUTO: 0.3 10E9/L (ref 0–0.7)
EOSINOPHIL NFR BLD AUTO: 2.7 %
ERYTHROCYTE [DISTWIDTH] IN BLOOD BY AUTOMATED COUNT: 16.4 % (ref 10–15)
GFR SERPL CREATININE-BSD FRML MDRD: >90 ML/MIN/1.7M2
GLUCOSE SERPL-MCNC: 72 MG/DL (ref 70–99)
HCT VFR BLD AUTO: 40.8 % (ref 40–53)
HGB BLD-MCNC: 13.1 G/DL (ref 13.3–17.7)
IMM GRANULOCYTES # BLD: 0 10E9/L (ref 0–0.4)
IMM GRANULOCYTES NFR BLD: 0.1 %
LYMPHOCYTES # BLD AUTO: 2.2 10E9/L (ref 0.8–5.3)
LYMPHOCYTES NFR BLD AUTO: 24 %
MAGNESIUM SERPL-MCNC: 2.2 MG/DL (ref 1.6–2.3)
MCH RBC QN AUTO: 30.8 PG (ref 26.5–33)
MCHC RBC AUTO-ENTMCNC: 32.1 G/DL (ref 31.5–36.5)
MCV RBC AUTO: 96 FL (ref 78–100)
MONOCYTES # BLD AUTO: 1.3 10E9/L (ref 0–1.3)
MONOCYTES NFR BLD AUTO: 14 %
NEUTROPHILS # BLD AUTO: 5.4 10E9/L (ref 1.6–8.3)
NEUTROPHILS NFR BLD AUTO: 59 %
NRBC # BLD AUTO: 0 10*3/UL
NRBC BLD AUTO-RTO: 0 /100
PHOSPHATE SERPL-MCNC: 1 MG/DL (ref 2.5–4.5)
PLATELET # BLD AUTO: 153 10E9/L (ref 150–450)
POTASSIUM SERPL-SCNC: 4.6 MMOL/L (ref 3.4–5.3)
PREALB SERPL IA-MCNC: 33 MG/DL (ref 15–45)
PROT SERPL-MCNC: 7 G/DL (ref 6.8–8.8)
RBC # BLD AUTO: 4.26 10E12/L (ref 4.4–5.9)
SODIUM SERPL-SCNC: 144 MMOL/L (ref 133–144)
TRIGL SERPL-MCNC: 83 MG/DL
WBC # BLD AUTO: 9.2 10E9/L (ref 4–11)

## 2017-11-16 PROCEDURE — 84134 ASSAY OF PREALBUMIN: CPT | Performed by: SURGERY

## 2017-11-16 PROCEDURE — 84478 ASSAY OF TRIGLYCERIDES: CPT | Performed by: SURGERY

## 2017-11-16 PROCEDURE — 85025 COMPLETE CBC W/AUTO DIFF WBC: CPT | Performed by: SURGERY

## 2017-11-16 PROCEDURE — 84100 ASSAY OF PHOSPHORUS: CPT | Performed by: SURGERY

## 2017-11-16 PROCEDURE — 82248 BILIRUBIN DIRECT: CPT | Performed by: SURGERY

## 2017-11-16 PROCEDURE — 80053 COMPREHEN METABOLIC PANEL: CPT | Performed by: SURGERY

## 2017-11-16 PROCEDURE — 83735 ASSAY OF MAGNESIUM: CPT | Performed by: SURGERY

## 2017-11-17 ENCOUNTER — HOME INFUSION (PRE-WILLOW HOME INFUSION) (OUTPATIENT)
Dept: PHARMACY | Facility: CLINIC | Age: 47
End: 2017-11-17

## 2017-11-20 ENCOUNTER — HOME INFUSION (PRE-WILLOW HOME INFUSION) (OUTPATIENT)
Dept: PHARMACY | Facility: CLINIC | Age: 47
End: 2017-11-20

## 2017-11-20 ENCOUNTER — CARE COORDINATION (OUTPATIENT)
Dept: CARE COORDINATION | Facility: CLINIC | Age: 47
End: 2017-11-20

## 2017-11-20 LAB
ANION GAP SERPL CALCULATED.3IONS-SCNC: 6 MMOL/L (ref 3–14)
BUN SERPL-MCNC: 12 MG/DL (ref 7–30)
CALCIUM SERPL-MCNC: 8.2 MG/DL (ref 8.5–10.1)
CHLORIDE SERPL-SCNC: 110 MMOL/L (ref 94–109)
CO2 SERPL-SCNC: 27 MMOL/L (ref 20–32)
CREAT SERPL-MCNC: 0.76 MG/DL (ref 0.66–1.25)
GFR SERPL CREATININE-BSD FRML MDRD: >90 ML/MIN/1.7M2
GLUCOSE SERPL-MCNC: 80 MG/DL (ref 70–99)
MAGNESIUM SERPL-MCNC: 2.2 MG/DL (ref 1.6–2.3)
PHOSPHATE SERPL-MCNC: 1.2 MG/DL (ref 2.5–4.5)
POTASSIUM SERPL-SCNC: 3.8 MMOL/L (ref 3.4–5.3)
SODIUM SERPL-SCNC: 143 MMOL/L (ref 133–144)

## 2017-11-20 PROCEDURE — 80048 BASIC METABOLIC PNL TOTAL CA: CPT | Performed by: SURGERY

## 2017-11-20 PROCEDURE — 84100 ASSAY OF PHOSPHORUS: CPT | Performed by: SURGERY

## 2017-11-20 PROCEDURE — 83735 ASSAY OF MAGNESIUM: CPT | Performed by: SURGERY

## 2017-11-20 NOTE — PROGRESS NOTES
This is a recent snapshot of the patient's [facility] medical record.  For current drug dose and complete information and questions, call [phone] or In Basket pool, fv home infusion ([id number])  CSN Number:  [id number]

## 2017-11-20 NOTE — PROGRESS NOTES
Clinic Care Coordination Contact  [facility]/Voicemail    Referral Source: IP/TCU Report  Clinical Data: Care Coordinator Outreach  Outreach attempted x 1.  Left message on voicemail with call back information and requested return call.  Plan: Care Coordinator mailed out care coordination introduction letter on 4/4/16. Care Coordinator will try to reach patient again in 5-10 business days.    Melissa Behl BSN, RN, PHN  [facility] Care Coordinator  [phone]  [email or web address]

## 2017-11-20 NOTE — PROGRESS NOTES
This is a recent snapshot of the patient's [facility] medical record.  For current drug dose and complete information and questions, call [phone] or In Basket pool, fv home infusion ([id number])  CSN Number:  [id number]

## 2017-11-22 ENCOUNTER — TELEPHONE (OUTPATIENT)
Dept: FAMILY MEDICINE | Facility: CLINIC | Age: 47
End: 2017-11-22

## 2017-11-22 NOTE — TELEPHONE ENCOUNTER
Reason for call:  Form  Reason for Call:  Form, our goal is to have forms completed with 72 hours, however, some forms may require a visit or additional information.    Type of letter, form or note:  medical    Who is the form from?: Kerri    Where did the form come from: form was faxed in    What clinic location was the form placed at?: [facility] - [phone]    Where the form was placed: Dr's Box    What number is listed as a contact on the form?:  [phone]       Additional comments:  Fax to [phone]    created by Balbina Lindajohn

## 2017-11-22 NOTE — TELEPHONE ENCOUNTER
Per DA, pt needs an OV to complete the extensive forms.    Left detailed message informing patient.     Forms have been placed in TC inbox- green folder- awaiting appt

## 2017-11-22 NOTE — PROGRESS NOTES
This is a recent snapshot of the patient's [facility] medical record.  For current drug dose and complete information and questions, call [phone] or In Basket pool, fv home infusion ([id number])  CSN Number:  [id number]

## 2017-11-24 ENCOUNTER — HOME INFUSION (PRE-WILLOW HOME INFUSION) (OUTPATIENT)
Dept: PHARMACY | Facility: CLINIC | Age: 47
End: 2017-11-24

## 2017-11-24 LAB
ANION GAP SERPL CALCULATED.3IONS-SCNC: 5 MMOL/L (ref 3–14)
BUN SERPL-MCNC: 18 MG/DL (ref 7–30)
CALCIUM SERPL-MCNC: 8.3 MG/DL (ref 8.5–10.1)
CHLORIDE SERPL-SCNC: 110 MMOL/L (ref 94–109)
CO2 SERPL-SCNC: 27 MMOL/L (ref 20–32)
CREAT SERPL-MCNC: 0.73 MG/DL (ref 0.66–1.25)
GFR SERPL CREATININE-BSD FRML MDRD: >90 ML/MIN/1.7M2
GLUCOSE SERPL-MCNC: 88 MG/DL (ref 70–99)
MAGNESIUM SERPL-MCNC: 2 MG/DL (ref 1.6–2.3)
PHOSPHATE SERPL-MCNC: 1.9 MG/DL (ref 2.5–4.5)
POTASSIUM SERPL-SCNC: 4 MMOL/L (ref 3.4–5.3)
SODIUM SERPL-SCNC: 142 MMOL/L (ref 133–144)

## 2017-11-24 PROCEDURE — 80048 BASIC METABOLIC PNL TOTAL CA: CPT | Performed by: SURGERY

## 2017-11-24 PROCEDURE — 83735 ASSAY OF MAGNESIUM: CPT | Performed by: SURGERY

## 2017-11-24 PROCEDURE — 84100 ASSAY OF PHOSPHORUS: CPT | Performed by: SURGERY

## 2017-11-27 ENCOUNTER — HOME INFUSION (PRE-WILLOW HOME INFUSION) (OUTPATIENT)
Dept: PHARMACY | Facility: CLINIC | Age: 47
End: 2017-11-27

## 2017-11-27 NOTE — PROGRESS NOTES
This is a recent snapshot of the patient's [facility] medical record.  For current drug dose and complete information and questions, call [phone] or In Basket pool, fv home infusion ([id number])  CSN Number:  [id number]

## 2017-11-29 ENCOUNTER — HOME INFUSION (PRE-WILLOW HOME INFUSION) (OUTPATIENT)
Dept: PHARMACY | Facility: CLINIC | Age: 47
End: 2017-11-29

## 2017-11-30 NOTE — PROGRESS NOTES
Clinic Care Coordination Contact  [facility]/Voicemail    Referral Source: IP/TCU Report  Clinical Data: Care Coordinator Outreach  Outreach attempted x 2.  Left message on voicemail with call back information and requested return call.  Plan: Care Coordinator mailed out care coordination introduction letter on 4/4/16. Care Coordinator will try to reach patient again in 10-15 business days.    Melissa Behl BSN, RN, N  [facility] Care Coordinator  [phone]

## 2017-12-04 ENCOUNTER — MYC REFILL (OUTPATIENT)
Dept: FAMILY MEDICINE | Facility: CLINIC | Age: 47
End: 2017-12-04

## 2017-12-04 ENCOUNTER — MYC MEDICAL ADVICE (OUTPATIENT)
Dept: PALLIATIVE MEDICINE | Facility: CLINIC | Age: 47
End: 2017-12-04

## 2017-12-04 ENCOUNTER — MYC MEDICAL ADVICE (OUTPATIENT)
Dept: INFECTIOUS DISEASES | Facility: CLINIC | Age: 47
End: 2017-12-04

## 2017-12-04 DIAGNOSIS — G89.29 CHRONIC ABDOMINAL PAIN: ICD-10-CM

## 2017-12-04 DIAGNOSIS — R11.0 NAUSEA: ICD-10-CM

## 2017-12-04 DIAGNOSIS — Z79.891 ENCOUNTER FOR LONG-TERM OPIATE ANALGESIC USE: ICD-10-CM

## 2017-12-04 DIAGNOSIS — R10.9 CHRONIC ABDOMINAL PAIN: ICD-10-CM

## 2017-12-04 DIAGNOSIS — Z98.84 BARIATRIC SURGERY STATUS: ICD-10-CM

## 2017-12-04 DIAGNOSIS — G89.18 PAIN FOLLOWING SURGERY OR PROCEDURE: ICD-10-CM

## 2017-12-04 NOTE — TELEPHONE ENCOUNTER
"Message from MyChart:  Original authorizing provider: MD Parker Camara Sr would like a refill of the following medications:  Needle, Disp, (BD DISP NEEDLES) 27G X 1/2\" MISC [Esteban Daly MD]  ondansetron (ZOFRAN-ODT) 8 MG ODT tab [Esteban Daly MD]  cyanocobalamin (VITAMIN B12) 1000 MCG/ML injection [Esteban Daly MD]  lidocaine-prilocaine (EMLA) cream [Esteban Daly MD]    Preferred pharmacy: [place], [place] - [address]    Comment:    "

## 2017-12-05 ENCOUNTER — CARE COORDINATION (OUTPATIENT)
Dept: SURGERY | Facility: CLINIC | Age: 47
End: 2017-12-05

## 2017-12-05 ENCOUNTER — CARE COORDINATION (OUTPATIENT)
Dept: CARE COORDINATION | Facility: CLINIC | Age: 47
End: 2017-12-05

## 2017-12-05 ENCOUNTER — HOME INFUSION (PRE-WILLOW HOME INFUSION) (OUTPATIENT)
Dept: PHARMACY | Facility: CLINIC | Age: 47
End: 2017-12-05

## 2017-12-05 DIAGNOSIS — K90.9 MALABSORPTION: Primary | ICD-10-CM

## 2017-12-05 LAB
ANION GAP SERPL CALCULATED.3IONS-SCNC: 7 MMOL/L (ref 3–14)
BUN SERPL-MCNC: 9 MG/DL (ref 7–30)
CALCIUM SERPL-MCNC: 8.4 MG/DL (ref 8.5–10.1)
CHLORIDE SERPL-SCNC: 109 MMOL/L (ref 94–109)
CO2 SERPL-SCNC: 27 MMOL/L (ref 20–32)
CREAT SERPL-MCNC: 0.68 MG/DL (ref 0.66–1.25)
GFR SERPL CREATININE-BSD FRML MDRD: >90 ML/MIN/1.7M2
GLUCOSE SERPL-MCNC: 73 MG/DL (ref 70–99)
MAGNESIUM SERPL-MCNC: 1.9 MG/DL (ref 1.6–2.3)
PHOSPHATE SERPL-MCNC: 2.5 MG/DL (ref 2.5–4.5)
POTASSIUM SERPL-SCNC: 3.7 MMOL/L (ref 3.4–5.3)
SODIUM SERPL-SCNC: 143 MMOL/L (ref 133–144)

## 2017-12-05 PROCEDURE — 84100 ASSAY OF PHOSPHORUS: CPT | Performed by: SURGERY

## 2017-12-05 PROCEDURE — 83735 ASSAY OF MAGNESIUM: CPT | Performed by: SURGERY

## 2017-12-05 PROCEDURE — 80048 BASIC METABOLIC PNL TOTAL CA: CPT | Performed by: SURGERY

## 2017-12-05 NOTE — PROGRESS NOTES
RN CC observed through chart review that Dr. Vyas's team has placed this order in 12/5/17 telephone encounter and updated the patient via MyChart and voicemail.  FHI and PCP Updated.    Melissa Behl BSN, RN, N  [facility] Care Coordinator  [phone]

## 2017-12-05 NOTE — TELEPHONE ENCOUNTER
Medication refill information reviewed.  Patient needs updated OA    Due date for     oxyCODONE (ROXICODONE) 5 MG/5ML solution  is 12/17/17   fentaNYL (DURAGESIC) 50 mcg/hr 72 hr patch is 12/17/17    Prescriptions prepped for review.     Will route to provider.

## 2017-12-05 NOTE — PROGRESS NOTES
Pt wife called and patient wanted Port placed. Left a message informing them of number to call for appointment. Will send my chart message also.

## 2017-12-05 NOTE — PROGRESS NOTES
Please order this through epic.  Cannot be a verbal order, per message form RN CC.    Per RN CC - asked to done the encounter.  No need for Dr Daly to address.

## 2017-12-05 NOTE — TELEPHONE ENCOUNTER
Patient requesting refill(s) of oxyCODONE (ROXICODONE) 5 MG/5ML solution   Last picked up from pharmacy on 11/15/17    fentaNYL (DURAGESIC) 50 mcg/hr 72 hr patch  Last picked up from pharmacy on 11/15/17    Pt last seen by prescribing provider on 9/18/17   Next appt scheduled for 1/3/18    Last urine drug screen date 4/5/17  Current opioid agreement on file (completed within the last year) Yes Date of opioid agreement: 11/25/16    Processing (pick one)     Mail to [facility]  [address] [facility] [address]    Will route to nursing pool for review and preparation of prescription(s).

## 2017-12-05 NOTE — TELEPHONE ENCOUNTER
----- Message from Gabbi Thompson MD sent at 12/5/2017 11:08 AM CST -----  Regarding: RE: DSC Frosch  Thanks.      ----- Message -----     From: Cristina Fregoso RN     Sent: 12/5/2017  10:50 AM       To: Gabbi Thompson MD  Subject: YAEL Thompson,  A woman called on behalf of Parker.    She wants to know who would put the order in for Parker to get a port in place of his PICC for his TPN and Hydration??    She would love it if it was you and you (I) could get him set up with the apt for that.    I told her our ID doc's usually do not order ports and that she may need to contact his PCP or his surgeon  Dr Vyas??    Cristina Fregoso, RN

## 2017-12-05 NOTE — TELEPHONE ENCOUNTER
Called Rose, pt's wife to let her know Dr Thompson is not the person who would order a new port. Pt will need to consult with PCP or whoever is responsible for the TPN to get orders for new port to go in. Rose verbalized understanding.  Cristina Fregoso RN

## 2017-12-05 NOTE — PROGRESS NOTES
Clinic Care Coordination Contact  Care Team Conversations    RN CC received a call from patient's spouse Rose stating patient's PICC line has a hole in it and he needs a port placed right away.  Rose states she has notified [facility] and was hoping writer would be able to assist in making this happen for patient.    RN CC called [facility] to determine what has been done already for patient's spouse's report of patient's PICC having a hole and requesting a port placed.  RN CC was informed that [facility] was not aware of any issues with the PICC, only that patient was wanting a port placed around Bairon of this year.  [facility] will look into the matter and call writer back.    Melissa Behl BSN, RN, N  [facility] Care Coordinator  [phone]

## 2017-12-06 ENCOUNTER — HOME INFUSION (PRE-WILLOW HOME INFUSION) (OUTPATIENT)
Dept: PHARMACY | Facility: CLINIC | Age: 47
End: 2017-12-06

## 2017-12-06 ENCOUNTER — TELEPHONE (OUTPATIENT)
Dept: FAMILY MEDICINE | Facility: CLINIC | Age: 47
End: 2017-12-06

## 2017-12-06 RX ORDER — FENTANYL 50 UG/1
1 PATCH TRANSDERMAL
Qty: 15 PATCH | Refills: 0 | Status: SHIPPED | OUTPATIENT
Start: 2017-12-06 | End: 2018-01-03

## 2017-12-06 RX ORDER — LIDOCAINE/PRILOCAINE 2.5 %-2.5%
CREAM (GRAM) TOPICAL PRN
Qty: 30 G | Refills: 11 | OUTPATIENT
Start: 2017-12-06

## 2017-12-06 RX ORDER — CYANOCOBALAMIN 1000 UG/ML
1 INJECTION, SOLUTION INTRAMUSCULAR; SUBCUTANEOUS
Qty: 1 ML | Refills: 11 | OUTPATIENT
Start: 2017-12-06

## 2017-12-06 RX ORDER — OXYCODONE HCL 5 MG/5 ML
10-15 SOLUTION, ORAL ORAL EVERY 4 HOURS PRN
Qty: 1650 ML | Refills: 0 | Status: SHIPPED | OUTPATIENT
Start: 2017-12-06 | End: 2018-01-03

## 2017-12-06 RX ORDER — ONDANSETRON 8 MG/1
8 TABLET, ORALLY DISINTEGRATING ORAL EVERY 8 HOURS PRN
Qty: 90 TABLET | Refills: 3 | OUTPATIENT
Start: 2017-12-06

## 2017-12-06 NOTE — TELEPHONE ENCOUNTER
Signed Prescriptions:                        Disp   Refills    oxyCODONE (ROXICODONE) 5 MG/5ML solution   1650 mL0        Sig: Take 10-15 mLs (10-15 mg) by mouth every 4 hours as           needed for moderate to severe pain Max of 55 mg           per day. Fill on/after 12/15/17 not to start           untill 12/17/17.  Authorizing Provider: BRANDYN JENKINS    fentaNYL (DURAGESIC) 50 mcg/hr 72 hr patch 15 pat*0        Sig: Place 1 patch onto the skin every 48 hours MYLAN           BRAND ONLY. Fill on/after 12/15/17 to start           on/after 12/17/17  Authorizing Provider: BRANDYN JENKINS    Patient needs updated OA    Jessica Jenkins MD  [facility]

## 2017-12-06 NOTE — TELEPHONE ENCOUNTER
PA Initiation    Medication: Adderall brand- initiated  Insurance Company: [employer] - Phone [phone] Fax [phone]  Pharmacy [place]illing the Rx: [facility] [place] - [address] [place] [address]  Filling Pharmacy Phone: [phone]  Filling Pharmacy Fax:    Start Date: 12/6/2017

## 2017-12-06 NOTE — TELEPHONE ENCOUNTER
Gabbi Thompson MD   You 20 hours ago (10:54 AM)                   I am not sure what Parker needs.  I do not manage his port - it should be done by the provider that has advised that he be on TPN.   Gabbi (Routing comment)

## 2017-12-06 NOTE — PROGRESS NOTES
This is a recent snapshot of the patient's [facility] medical record.  For current drug dose and complete information and questions, call [phone] or In Basket pool, fv home infusion ([id number])  CSN Number:  [id number]

## 2017-12-06 NOTE — PROGRESS NOTES
This is a recent snapshot of the patient's [facility] medical record.  For current drug dose and complete information and questions, call [phone] or In Basket pool, fv home infusion ([id number])  CSN Number:  [id number]

## 2017-12-06 NOTE — TELEPHONE ENCOUNTER
"Requested Prescriptions   Pending Prescriptions Disp Refills     Needle, Disp, (BD DISP NEEDLES) 27G X 1/2\" MISC 3 each 4     Si Device every 30 days Use for cyanocobalamin injection once q 30 days.    There is no refill protocol information for this order        ondansetron (ZOFRAN-ODT) 8 MG ODT tab 90 tablet 3     Sig: Take 1 tablet (8 mg) by mouth every 8 hours as needed for nausea     Antivertigo/Antiemetic Agents Passed    2017  3:41 PM       Passed - Recent or future visit with authorizing provider's specialty    Patient had office visit in the last year or has a visit in the next 30 days with authorizing provider.  See chart review.              Passed - Patient is 18 years of age or older        cyanocobalamin (VITAMIN B12) 1000 MCG/ML injection 1 mL 11     Sig: Inject 1 mL (1,000 mcg) into the muscle every 30 days    There is no refill protocol information for this order        lidocaine-prilocaine (EMLA) cream 30 g 11     Sig: Apply topically as needed for moderate pain    There is no refill protocol information for this order        Needle, Disp, (BD DISP NEEDLES) 27G X 1/2\" MISC  Rx was sent 2017 for 3 each and 4 refills. Patient should have medication through 2018.  Pharmacy notified via E-prescribe refusal.  Melva Alfredo RN, BSN     ondansetron (ZOFRAN-ODT) 8 MG ODT tab  Rx was sent 2017 for 90 tabs and 3 refills.   Pharmacy notified via E-prescribe refusal.  Melva Alfredo RN, BSN     cyanocobalamin (VITAMIN B12) 1000 MCG/ML injection  Rx was sent 2017 for 1 mL and 11 refills. Patient should have medication through 2018.  Pharmacy notified via E-prescribe refusal.  Melva Alfredo RN, BSN     lidocaine-prilocaine (EMLA) cream  Rx was sent 2017 for 30g and 11 refills. Pharmacy notified via E-prescribe refusal.  Melva Alfredo RN, BSN         "

## 2017-12-06 NOTE — PROGRESS NOTES
This is a recent snapshot of the patient's [facility] medical record.  For current drug dose and complete information and questions, call [phone] or In Basket pool, fv home infusion ([id number])  CSN Number:  [id number]

## 2017-12-06 NOTE — TELEPHONE ENCOUNTER
Patient needs updated OA    Script was signed and kept in TPI cart. Informed patient to schedule a nurse visit at [facility] to dean OA with us.      Lyndsay Solo MA

## 2017-12-06 NOTE — TELEPHONE ENCOUNTER
Patient has dual insurance.  PA was done for [facility] previously, but now his Medicare Part B&D Insurance is coming up Product/service not covered, so need to do PA thru them too now.    PA needed for: Adderall 20mg tabs (pt needs brand only)   Insurance: Medicare Part B&D  Insur phone: [phone]  Patient ID:  [id number]       -Fany John, Pharm.D., [facility], [phone]

## 2017-12-07 ENCOUNTER — ALLIED HEALTH/NURSE VISIT (OUTPATIENT)
Dept: PALLIATIVE MEDICINE | Facility: CLINIC | Age: 47
End: 2017-12-07
Payer: COMMERCIAL

## 2017-12-07 DIAGNOSIS — G89.4 CHRONIC PAIN SYNDROME: Primary | ICD-10-CM

## 2017-12-07 PROCEDURE — 99207 ZZC NO CHARGE NURSE ONLY: CPT

## 2017-12-07 NOTE — LETTER
[facility]    12/07/17    Patient: Parker Acevedo Sr  YOB: 1970  Medical Record Number: [id number]                                                                  Controlled Substance Agreement  I understand that my care provider has prescribed controlled substances (narcotics, tranquilizers, and/or stimulants) to help manage my condition(s).  I am taking this medicine to help me function or work.  I know that this is strong medicine.  It could have serious side effects and even cause a dependency on the drug.  If I stop these medicines suddenly, I could have severe withdrawal symptoms.    The risks, benefits, and side effects of these medication(s) were explained to me.  I agree that:  1. I will take part in other treatments as advised by my provider.  This may be psychiatry or counseling, physical therapy, behavioral therapy, group treatment, or a referral to a pain clinic.  I will reduce or stop my medicine when my provider tells me to do so.   2. I will take my medicines as prescribed.  I will not change the dose or schedule unless my provider tells me to.  There will be no refills if I  run out early.   I may be contacted at any time without warning and asked to complete a drug test or pill count.   3. I will keep all my appointments at the clinic.  If I miss appointments or fail to follow instructions, my provider may stop my medicine.  4. I will not ask other providers to prescribe controlled substances. And I will not accept controlled substances from other people. If I need another prescribed controlled substance for a new reason, I will notify my provider within one business day.  5. If I enroll in the [place] Medical Marijuana program, I will tell my provider.  I will also sign an agreement to share my medical records with my provider.  6. I will use one pharmacy to fill all of my controlled substance prescriptions.  If my prescription is mailed to my  pharmacy, it may take 5 to 7 days for my medicine to be ready.  7. I understand that my provider, clinic care team, and pharmacy can track controlled substance prescriptions from other providers through a central database (prescription monitoring program).  8. I will bring in my list of medications (or my medicine bottles) each time I come to the clinic.  REV- 04/2016                                                                                                                                            Page 1 of 2      [facility]    12/07/17    Patient: Parker Acevedo Sr  YOB: 1970  Medical Record Number: [id number]    9. Refills of controlled substances will be made only during office hours.  It is up to me to make sure that I do not run out of my medicines on weekends or holidays.    10. I am responsible for my prescriptions.  If the medicine is lost or stolen, it will not be replaced.   I also agree not to share these medicines with anyone.  11. I agree to not use ANY illegal or recreational drugs.  This includes marijuana, cocaine, bath salts or other drugs.  I agree not to use alcohol unless my provider says I may.  I agree to give urine samples whenever asked.  If I fail to give a urine sample, the provider may stop my medicine.     12. I will tell my nurse or provider right away if I become pregnant or have a new medical problem treated outside of [facility].  13. I understand that this medicine can affect my thinking and judgment.  It may be unsafe for me to drive, use machinery and do dangerous tasks.  I will not do any of these things until I know how the medicine affects me.  If my dose changes, I will wait to see how it affects me.  I will contact my provider if I have concerns about medicine side effects.  I understand that if I do not follow any of the conditions above, my prescriptions or treatment may be stopped.    I agree that my provider,  clinic care team, and pharmacy may work with any city, state or federal law enforcement agency that investigates the misuse, sale, or other diversion of my controlled medicine. I will allow my provider to discuss my care with or share a copy of this agreement with any other treating provider, pharmacy or emergency room where I receive care.  I agree to give up (waive) any right of privacy or confidentiality with respect to these authorizations.   I have read this agreement and have asked questions about anything I did not understand.   ___________________________________    ___________________________  Patient Signature                                                           Date and Time  ___________________________________     ____________________________  BG PAIN NURSE                                                                        Date and Time  ___________________________________  Patti Milligan MD  REV-  04/2016                                                                                                                                                                 Page 2 of 2  Opioid Pain Medicines (also known as Narcotics)  What You Need to Know      What are opioids?   Opioids are pain medicines that must be prescribed by a doctor. Examples are:     morphine (MS Contin, Do)    oxycodone (Oxycontin)    oxycodone and acetaminophen (Percocet)    hydrocodone and acetaminophen (Vicodin, Norco)     fentanyl patch (Duragesic)     hydromorphone (Dilaudid)     methadone     What do opioids do well?   Opioids are best for short-term pain after a surgery or injury. They also work well for cancer pain. Unlike other pain medicines, they do not cause liver or kidney failure or ulcers. They may help some people with long-lasting (chronic) pain.     What do opioids NOT do well?   Opioids never get rid of pain entirely, and they do not work well for most patients with chronic pain. Opioids do not reduce  swelling, one of the causes of pain. They also don t work well for nerve pain.     Side effects  Talk to your doctor before you start or decide to keep taking one of these medicines. Side effects include:    Lowers your breathing rate enough that it could cause death    Death due to taking more than the prescribed dose    Serious lifelong opioid use      Dependence is not the same as addiction. Addiction is when people keep using a substance that harms their body, their mind or their relations with others. If you have a history of drug or alcohol abuse, taking opioids can cause a relapse.  Over time, opioids don t work as well. Most people will need higher and higher doses. The higher the dose, the more serious the side effects. We don t know the long-term effects of opioids.   People who have used opioids for a long time have a lower quality of life, worse depression, higher levels of pain and more visits to doctors.  Overdose from prescription drugs is the second leading cause of death in the [place] The risk of overdose rises when opioids are taken with other drugs such as:    Medicines used for anxiety and panic attacks (such as lorazepam, alprazolam, clonazepam    Other sedatives    Alcohol    Illegal drugs such as heroin  Never share your opioids with others. Be sure to store opioids in a secure place, locked if possible.Young children can easily swallow them and overdose.     Are there other ways to manage pain?  Ways to help reduce pain:    Exercise every day.    Treat health problems that may be causing pain.    Treat mental health problems like depression and anxiety.     Worse depression symptoms; Less pleasure in things you usually enjoy    Feeling tired or sluggish    Slower thoughts or cloudy thinking    Being more sensitive to pain over time; Pain is harder to control.    Trouble sleeping or restless sleep    Changes in hormone levels (for example, less testosterone).     Changes in sex drive or ability  to have sex    Long lasting nausea and constipation    Trouble breathing while asleep; This is worse with lung problems like COPD or sleep apnea.    Unsafe driving    Getting sick more often    Itching    Feeling dizzy    Dry mouth    Sweating    Trouble emptying the bladder (peeing). This is worse if you have an enlarged prostate or get urinary tract infections (UTIs).    What else should I know about opioids?  When someone takes opioids for too long or too often, they become dependent. This means that if you stop or reduce the medicine too quickly, you will have withdrawal symptoms.          Practice good sleep habits.  Try to go to bed and get up at the same time every day.    Stop smoking.  Tobacco use can make pain worse.    Do things that you enjoy.    Find a way to work through pain without drugs.  Try deep breathing, meditation, visual imagery and aromatherapy.    Ask your doctor to help you create a plan to manage your pain.

## 2017-12-07 NOTE — TELEPHONE ENCOUNTER
Prior Authorization Approval    Authorization Effective Date: 12/6/2017  Authorization Expiration Date: 12/31/2018  Medication: Adderall brand- APPROVED  Approved Dose/Quantity: 60 FOR 30   Reference #: [id number]   Insurance Company: [employer] [phone] Fax [phone]  Expected CoPay: $178.88     CoPay Card Available:      Foundation Assistance Needed:    Which Pharmacy is filling the prescription (Not needed for infusion/clinic administered): [facility] [place] - [address] [place] [address]  Pharmacy Notified: Yes  Patient Notified: Yes

## 2017-12-07 NOTE — NURSING NOTE
The opioid contract was reviewed and signed.  Pt was given a copy. The signed one was placed in bin to be scanned into epic.      Scripts given to pt.     Karo Nuñez RN, [facility]  Pain Clinic Care Coordinator

## 2017-12-07 NOTE — TELEPHONE ENCOUNTER
See nursing visit.     Karo Nuñez RN, [facility]  Pain Clinic Care Coordinator

## 2017-12-07 NOTE — MR AVS SNAPSHOT
After Visit Summary   12/7/2017    Parker Acevedo Sr    MRN: [id number]           Patient Information     Date Of Birth          1970        Visit Information        Provider Department      12/7/2017 10:00 AM Nurse,  [facility]        Today's Diagnoses     Chronic pain syndrome    -  1       Follow-ups after your visit        Your next 10 appointments already scheduled     Jan 03, 2018  2:00 PM CST   Return Visit with Patti Milligan MD   [facility] ([facility])    [address]  [place] [address] [phone]              Who to contact     If you have questions or need follow up information about today's clinic visit or your schedule please contact [facility] directly at [phone].  Normal or non-critical lab and imaging results will be communicated to you by MyChart, letter or phone within 4 business days after the clinic has received the results. If you do not hear from us within 7 days, please contact the clinic through MyChart or phone. If you have a critical or abnormal lab result, we will notify you by phone as soon as possible.  Submit refill requests through [employer] or call your pharmacy and they will forward the refill request to us. Please allow 3 business days for your refill to be completed.          Additional Information About Your Visit        MyChart Information     [employer] gives you secure access to your electronic health record. If you see a primary care provider, you can also send messages to your care team and make appointments. If you have questions, please call your primary care clinic.  If you do not have a primary care provider, please call [phone] and they will assist you.        Care EveryWhere ID     This is your Care EveryWhere ID. This could be used by other organizations to access your [facility] medical records  [id number]         Blood Pressure from Last 3  Encounters:   11/06/17 110/64   09/23/17 120/74   09/19/17 123/77    Weight from Last 3 Encounters:   11/06/17 79.9 kg (176 lb 1.6 oz)   09/21/17 92.5 kg (204 lb)   09/18/17 90.7 kg (200 lb)              Today, you had the following     No orders found for display       Primary Care Provider Office Phone # Fax #    Esteban Daly [phone] [address] [address]        Equal Access to Services     [facility]: Hadii aad ku hadasho Soomaali, waaxda luqadaha, qaybta kaalmada adeegyada, carmela dumont . So [facility] [phone].    ATENCIÓN: Si habla español, tiene a hicks disposición servicios gratuitos de asistencia lingüística. [facility] [phone].    We comply with applicable federal civil rights laws and [place] laws. We do not discriminate on the basis of race, color, national origin, age, disability, sex, sexual orientation, or gender identity.            Thank you!     Thank you for choosing [facility]  for your care. Our goal is always to provide you with excellent care. Hearing back from our patients is one way we can continue to improve our services. Please take a few minutes to complete the written survey that you may receive in the mail after your visit with us. Thank you!             Your Updated Medication List - Protect others around you: Learn how to safely use, store and throw away your medicines at www.disposemymeds.org.          This list is accurate as of: 12/7/17 10:39 AM.  Always use your most recent med list.                   Brand Name Dispense Instructions for use Diagnosis    albuterol 108 (90 BASE) MCG/ACT Inhaler    PROAIR HFA/PROVENTIL HFA/VENTOLIN HFA    1 Inhaler    Inhale 2 puffs into the lungs every 4 hours as needed for shortness of breath / dyspnea or wheezing    Aspiration pneumonitis (H)       * amphetamine-dextroamphetamine 20 MG per tablet    ADDERALL    60 tablet    Take 1 tablet (20 mg) by  "mouth 2 times daily    ADHD (attention deficit hyperactivity disorder), inattentive type       * amphetamine-dextroamphetamine 20 MG per tablet    ADDERALL    60 tablet    Take 1 tablet (20 mg) by mouth 2 times daily    ADHD (attention deficit hyperactivity disorder), inattentive type       * amphetamine-dextroamphetamine 20 MG per tablet   Start taking on:  1/5/2018    ADDERALL    60 tablet    Take 1 tablet (20 mg) by mouth 2 times daily    ADHD (attention deficit hyperactivity disorder), inattentive type       COREG PO      Take 12.5 mg by mouth 2 times daily        * cyanocobalamin 1000 MCG/ML injection    VITAMIN B12     Inject 1 mL into the muscle every 30 days        * cyanocobalamin 1000 MCG/ML injection    VITAMIN B12    1 mL    Inject 1 mL (1,000 mcg) into the muscle every 30 days    Bariatric surgery status       * [facility]      Contact [facility] for patient specific medication information at [phone] on admission and discharge from the hospital.  Phones are answered 24 hours a day 7 days a week 365 days a year.  Providers - Choose \"CONTINUE HOME MED (no script)\" at discharge if patient treatment with home infusion will continue.    S/P bariatric surgery       * [facility]IENT      Contact [facility] for patient specific medication information at [phone] on admission and discharge from the hospital.  Phones are answered 24 hours a day 7 days a week 365 days a year.  Providers - Choose \"CONTINUE HOME MED (no script)\" at discharge if patient treatment with home infusion will continue.    Severe malnutrition (H)       fentaNYL 50 mcg/hr 72 hr patch    DURAGESIC    15 patch    Place 1 patch onto the skin every 48 hours MYLAN BRAND ONLY. Fill on/after 12/15/17 to start on/after 12/17/17    Chronic abdominal pain       lidocaine-prilocaine cream    EMLA    30 g    Apply topically as needed for moderate pain    Pain " "following surgery or procedure       morphine 0.1% in intrasite topical gel     100 g    Apply as needed prior to accessing the port site.    Pain following surgery or procedure       MULTI-VITAMINS Tabs      Take 1 tablet by mouth        mupirocin 2 % ointment    BACTROBAN    30 g    Apply topically 3 times daily    Skin infection       naloxone nasal spray    NARCAN    0.2 mL    Spray 1 spray (4 mg) into one nostril alternating nostrils as needed for opioid reversal (every 2-3 minutes until assistance arrives.)    Encounter for long-term opiate analgesic use, Chronic abdominal pain       Needle (Disp) 27G X 1/2\" Misc    BD DISP NEEDLES    3 each    1 Device every 30 days Use for cyanocobalamin injection once q 30 days.    Bariatric surgery status       nystatin-triamcinolone cream    MYCOLOG II    60 g    Apply topically 2 times daily as needed    Skin irritation       ondansetron 8 MG ODT tab    ZOFRAN-ODT    90 tablet    Take 1 tablet (8 mg) by mouth every 8 hours as needed for nausea    Nausea       * order for DME     12 each    Injection Supplies for Vitamin B12: 3cc syringes w/ 27 gauge needles, 1/2 inch length    Bariatric surgery status       * order for DME     4 each    Equipment being ordered: Bilateral knee high chronic venous insufficiency stockings--  mild-moderate pressures.    Bilateral edema of lower extremity       oxyCODONE 5 MG/5ML solution    ROXICODONE    1650 mL    Take 10-15 mLs (10-15 mg) by mouth every 4 hours as needed for moderate to severe pain Max of 55 mg per day. Fill on/after 12/15/17 not to start untill 12/17/17.    Encounter for long-term opiate analgesic use       sucralfate 1 GM/10ML suspension    CARAFATE    1200 mL    Take 10 mLs (1 g) by mouth 4 times daily    Nausea       vitamin D 63673 UNIT capsule    ERGOCALCIFEROL    12 capsule    Take 1 capsule (50,000 Units) by mouth every 7 days    Vitamin D deficiency       * Notice:  This list has 9 medication(s) that are the same " as other medications prescribed for you. Read the directions carefully, and ask your doctor or other care provider to review them with you.

## 2017-12-07 NOTE — PROGRESS NOTES
This is a recent snapshot of the patient's [facility] medical record.  For current drug dose and complete information and questions, call [phone] or In Basket pool, [facility]v home infusion ([id number])  CSN Number:  [id number]

## 2017-12-08 ENCOUNTER — HOME INFUSION (PRE-WILLOW HOME INFUSION) (OUTPATIENT)
Dept: PHARMACY | Facility: CLINIC | Age: 47
End: 2017-12-08

## 2017-12-11 ENCOUNTER — HOME INFUSION (PRE-WILLOW HOME INFUSION) (OUTPATIENT)
Dept: PHARMACY | Facility: CLINIC | Age: 47
End: 2017-12-11

## 2017-12-11 ENCOUNTER — CARE COORDINATION (OUTPATIENT)
Dept: SURGERY | Facility: CLINIC | Age: 47
End: 2017-12-11

## 2017-12-11 NOTE — PROGRESS NOTES
This is a recent snapshot of the patient's [facility] medical record.  For current drug dose and complete information and questions, call [phone] or In Basket pool, fv home infusion ([id number])  CSN Number:  [id number]

## 2017-12-12 ENCOUNTER — HOME INFUSION (PRE-WILLOW HOME INFUSION) (OUTPATIENT)
Dept: PHARMACY | Facility: CLINIC | Age: 47
End: 2017-12-12

## 2017-12-12 ENCOUNTER — CARE COORDINATION (OUTPATIENT)
Dept: SURGERY | Facility: CLINIC | Age: 47
End: 2017-12-12

## 2017-12-12 NOTE — PROGRESS NOTES
This is a recent snapshot of the patient's [facility] medical record.  For current drug dose and complete information and questions, call [phone] or In Basket pool, fv home infusion ([id number])  CSN Number:  [id number]

## 2017-12-12 NOTE — PROGRESS NOTES
This is a recent snapshot of the patient's [facility] medical record.  For current drug dose and complete information and questions, call [phone] or In Basket pool, fv home infusion ([id number])  CSN Number:  [id number]

## 2017-12-13 ENCOUNTER — HOME INFUSION (PRE-WILLOW HOME INFUSION) (OUTPATIENT)
Dept: PHARMACY | Facility: CLINIC | Age: 47
End: 2017-12-13

## 2017-12-14 ENCOUNTER — HOME INFUSION (PRE-WILLOW HOME INFUSION) (OUTPATIENT)
Dept: PHARMACY | Facility: CLINIC | Age: 47
End: 2017-12-14

## 2017-12-14 NOTE — PROGRESS NOTES
This is a recent snapshot of the patient's [facility] medical record.  For current drug dose and complete information and questions, call [phone] or In Basket pool, fv home infusion ([id number])  CSN Number:  [id number]

## 2017-12-14 NOTE — PROGRESS NOTES
This is a recent snapshot of the patient's [facility] medical record.  For current drug dose and complete information and questions, call [phone] or In Basket pool, fv home infusion ([id number])  CSN Number:  [id number]

## 2017-12-15 RX ORDER — HEPARIN SODIUM (PORCINE) LOCK FLUSH IV SOLN 100 UNIT/ML 100 UNIT/ML
5 SOLUTION INTRAVENOUS
Status: DISCONTINUED | OUTPATIENT
Start: 2017-12-18 | End: 2017-12-20 | Stop reason: HOSPADM

## 2017-12-15 NOTE — PROGRESS NOTES
This is a recent snapshot of the patient's [facility] medical record.  For current drug dose and complete information and questions, call [phone] or In Basket pool, fv home infusion ([id number])  CSN Number:  [id number]

## 2017-12-17 ENCOUNTER — HOME INFUSION (PRE-WILLOW HOME INFUSION) (OUTPATIENT)
Dept: PHARMACY | Facility: CLINIC | Age: 47
End: 2017-12-17

## 2017-12-18 ENCOUNTER — ANESTHESIA EVENT (OUTPATIENT)
Dept: SURGERY | Facility: AMBULATORY SURGERY CENTER | Age: 47
End: 2017-12-18

## 2017-12-18 ENCOUNTER — HOME INFUSION (PRE-WILLOW HOME INFUSION) (OUTPATIENT)
Dept: PHARMACY | Facility: CLINIC | Age: 47
End: 2017-12-18

## 2017-12-18 LAB
ALBUMIN SERPL-MCNC: 3.4 G/DL (ref 3.4–5)
ALP SERPL-CCNC: 77 U/L (ref 40–150)
ALT SERPL W P-5'-P-CCNC: 27 U/L (ref 0–70)
ANION GAP SERPL CALCULATED.3IONS-SCNC: 7 MMOL/L (ref 3–14)
AST SERPL W P-5'-P-CCNC: 26 U/L (ref 0–45)
BILIRUB SERPL-MCNC: 0.4 MG/DL (ref 0.2–1.3)
BUN SERPL-MCNC: 12 MG/DL (ref 7–30)
CALCIUM SERPL-MCNC: 8.5 MG/DL (ref 8.5–10.1)
CHLORIDE SERPL-SCNC: 110 MMOL/L (ref 94–109)
CO2 SERPL-SCNC: 27 MMOL/L (ref 20–32)
CREAT SERPL-MCNC: 0.87 MG/DL (ref 0.66–1.25)
GFR SERPL CREATININE-BSD FRML MDRD: >90 ML/MIN/1.7M2
GLUCOSE SERPL-MCNC: 86 MG/DL (ref 70–99)
MAGNESIUM SERPL-MCNC: 1.9 MG/DL (ref 1.6–2.3)
PHOSPHATE SERPL-MCNC: 2.4 MG/DL (ref 2.5–4.5)
POTASSIUM SERPL-SCNC: 4.1 MMOL/L (ref 3.4–5.3)
PREALB SERPL IA-MCNC: 22 MG/DL (ref 15–45)
PROT SERPL-MCNC: 6.5 G/DL (ref 6.8–8.8)
SODIUM SERPL-SCNC: 144 MMOL/L (ref 133–144)

## 2017-12-18 PROCEDURE — 84100 ASSAY OF PHOSPHORUS: CPT | Performed by: SURGERY

## 2017-12-18 PROCEDURE — 84134 ASSAY OF PREALBUMIN: CPT | Performed by: SURGERY

## 2017-12-18 PROCEDURE — 80053 COMPREHEN METABOLIC PANEL: CPT | Performed by: SURGERY

## 2017-12-18 PROCEDURE — 83735 ASSAY OF MAGNESIUM: CPT | Performed by: SURGERY

## 2017-12-18 NOTE — PROGRESS NOTES
This is a recent snapshot of the patient's [facility] medical record.  For current drug dose and complete information and questions, call [phone] or In Basket pool, fv home infusion ([id number])  CSN Number:  [id number]

## 2017-12-19 ENCOUNTER — ANESTHESIA (OUTPATIENT)
Dept: SURGERY | Facility: AMBULATORY SURGERY CENTER | Age: 47
End: 2017-12-19

## 2017-12-19 ENCOUNTER — HOSPITAL ENCOUNTER (OUTPATIENT)
Facility: AMBULATORY SURGERY CENTER | Age: 47
End: 2017-12-19
Attending: PHYSICIAN ASSISTANT

## 2017-12-19 ENCOUNTER — RADIANT APPOINTMENT (OUTPATIENT)
Dept: RADIOLOGY | Facility: AMBULATORY SURGERY CENTER | Age: 47
End: 2017-12-19
Attending: SURGERY

## 2017-12-19 ENCOUNTER — SURGERY (OUTPATIENT)
Age: 47
End: 2017-12-19

## 2017-12-19 VITALS
TEMPERATURE: 97.6 F | WEIGHT: 197.6 LBS | DIASTOLIC BLOOD PRESSURE: 71 MMHG | SYSTOLIC BLOOD PRESSURE: 119 MMHG | OXYGEN SATURATION: 99 % | RESPIRATION RATE: 14 BRPM | HEART RATE: 60 BPM | BODY MASS INDEX: 27.66 KG/M2 | HEIGHT: 71 IN

## 2017-12-19 DIAGNOSIS — K90.9 MALABSORPTION: ICD-10-CM

## 2017-12-19 LAB
ERYTHROCYTE [DISTWIDTH] IN BLOOD BY AUTOMATED COUNT: 13.6 % (ref 10–15)
HCT VFR BLD AUTO: 40.3 % (ref 40–53)
HGB BLD-MCNC: 13.5 G/DL (ref 13.3–17.7)
INR PPP: 1 (ref 0.86–1.14)
MCH RBC QN AUTO: 31.8 PG (ref 26.5–33)
MCHC RBC AUTO-ENTMCNC: 33.5 G/DL (ref 31.5–36.5)
MCV RBC AUTO: 95 FL (ref 78–100)
PLATELET # BLD AUTO: 142 10E9/L (ref 150–450)
RBC # BLD AUTO: 4.24 10E12/L (ref 4.4–5.9)
WBC # BLD AUTO: 8.3 10E9/L (ref 4–11)

## 2017-12-19 DEVICE — IMPLANTABLE DEVICE: Type: IMPLANTABLE DEVICE | Site: CHEST  WALL | Status: FUNCTIONAL

## 2017-12-19 RX ORDER — ONDANSETRON 4 MG/1
4 TABLET, ORALLY DISINTEGRATING ORAL EVERY 30 MIN PRN
Status: DISCONTINUED | OUTPATIENT
Start: 2017-12-19 | End: 2017-12-20 | Stop reason: HOSPADM

## 2017-12-19 RX ORDER — HEPARIN SODIUM,PORCINE 10 UNIT/ML
5 VIAL (ML) INTRAVENOUS EVERY 24 HOURS
Status: DISCONTINUED | OUTPATIENT
Start: 2017-12-19 | End: 2017-12-20 | Stop reason: HOSPADM

## 2017-12-19 RX ORDER — NALOXONE HYDROCHLORIDE 0.4 MG/ML
.1-.4 INJECTION, SOLUTION INTRAMUSCULAR; INTRAVENOUS; SUBCUTANEOUS
Status: DISCONTINUED | OUTPATIENT
Start: 2017-12-19 | End: 2017-12-20 | Stop reason: HOSPADM

## 2017-12-19 RX ORDER — PROPOFOL 10 MG/ML
INJECTION, EMULSION INTRAVENOUS PRN
Status: DISCONTINUED | OUTPATIENT
Start: 2017-12-19 | End: 2017-12-19

## 2017-12-19 RX ORDER — PROPOFOL 10 MG/ML
INJECTION, EMULSION INTRAVENOUS CONTINUOUS PRN
Status: DISCONTINUED | OUTPATIENT
Start: 2017-12-19 | End: 2017-12-19

## 2017-12-19 RX ORDER — ONDANSETRON 2 MG/ML
4 INJECTION INTRAMUSCULAR; INTRAVENOUS EVERY 30 MIN PRN
Status: DISCONTINUED | OUTPATIENT
Start: 2017-12-19 | End: 2017-12-20 | Stop reason: HOSPADM

## 2017-12-19 RX ORDER — FENTANYL CITRATE 50 UG/ML
25-50 INJECTION, SOLUTION INTRAMUSCULAR; INTRAVENOUS
Status: DISCONTINUED | OUTPATIENT
Start: 2017-12-19 | End: 2017-12-20 | Stop reason: HOSPADM

## 2017-12-19 RX ORDER — SODIUM CHLORIDE, SODIUM LACTATE, POTASSIUM CHLORIDE, CALCIUM CHLORIDE 600; 310; 30; 20 MG/100ML; MG/100ML; MG/100ML; MG/100ML
INJECTION, SOLUTION INTRAVENOUS CONTINUOUS
Status: DISCONTINUED | OUTPATIENT
Start: 2017-12-19 | End: 2017-12-20 | Stop reason: HOSPADM

## 2017-12-19 RX ORDER — LIDOCAINE 40 MG/G
CREAM TOPICAL
Status: DISCONTINUED | OUTPATIENT
Start: 2017-12-19 | End: 2017-12-20 | Stop reason: HOSPADM

## 2017-12-19 RX ORDER — GABAPENTIN 300 MG/1
300 CAPSULE ORAL ONCE
Status: DISCONTINUED | OUTPATIENT
Start: 2017-12-19 | End: 2017-12-20 | Stop reason: HOSPADM

## 2017-12-19 RX ORDER — HEPARIN SODIUM (PORCINE) LOCK FLUSH IV SOLN 100 UNIT/ML 100 UNIT/ML
5 SOLUTION INTRAVENOUS
Status: DISCONTINUED | OUTPATIENT
Start: 2017-12-19 | End: 2017-12-20 | Stop reason: HOSPADM

## 2017-12-19 RX ORDER — HEPARIN SODIUM,PORCINE 10 UNIT/ML
VIAL (ML) INTRAVENOUS PRN
Status: DISCONTINUED | OUTPATIENT
Start: 2017-12-19 | End: 2017-12-19 | Stop reason: HOSPADM

## 2017-12-19 RX ORDER — CLINDAMYCIN PHOSPHATE 900 MG/50ML
900 INJECTION, SOLUTION INTRAVENOUS
Status: COMPLETED | OUTPATIENT
Start: 2017-12-19 | End: 2017-12-19

## 2017-12-19 RX ORDER — LIDOCAINE HYDROCHLORIDE 20 MG/ML
INJECTION, SOLUTION INFILTRATION; PERINEURAL PRN
Status: DISCONTINUED | OUTPATIENT
Start: 2017-12-19 | End: 2017-12-19

## 2017-12-19 RX ORDER — ACETAMINOPHEN 325 MG/1
975 TABLET ORAL ONCE
Status: DISCONTINUED | OUTPATIENT
Start: 2017-12-19 | End: 2017-12-20 | Stop reason: HOSPADM

## 2017-12-19 RX ORDER — SODIUM CHLORIDE, SODIUM LACTATE, POTASSIUM CHLORIDE, CALCIUM CHLORIDE 600; 310; 30; 20 MG/100ML; MG/100ML; MG/100ML; MG/100ML
INJECTION, SOLUTION INTRAVENOUS CONTINUOUS PRN
Status: DISCONTINUED | OUTPATIENT
Start: 2017-12-19 | End: 2017-12-19

## 2017-12-19 RX ORDER — ONDANSETRON 2 MG/ML
INJECTION INTRAMUSCULAR; INTRAVENOUS PRN
Status: DISCONTINUED | OUTPATIENT
Start: 2017-12-19 | End: 2017-12-19

## 2017-12-19 RX ADMIN — PROPOFOL 200 MCG/KG/MIN: 10 INJECTION, EMULSION INTRAVENOUS at 09:08

## 2017-12-19 RX ADMIN — Medication 5 ML: at 09:37

## 2017-12-19 RX ADMIN — LIDOCAINE HYDROCHLORIDE 60 MG: 20 INJECTION, SOLUTION INFILTRATION; PERINEURAL at 09:04

## 2017-12-19 RX ADMIN — PROPOFOL: 10 INJECTION, EMULSION INTRAVENOUS at 09:40

## 2017-12-19 RX ADMIN — ONDANSETRON 4 MG: 2 INJECTION INTRAMUSCULAR; INTRAVENOUS at 09:04

## 2017-12-19 RX ADMIN — SODIUM CHLORIDE, SODIUM LACTATE, POTASSIUM CHLORIDE, CALCIUM CHLORIDE: 600; 310; 30; 20 INJECTION, SOLUTION INTRAVENOUS at 08:59

## 2017-12-19 RX ADMIN — Medication 5 ML: at 09:36

## 2017-12-19 RX ADMIN — CLINDAMYCIN PHOSPHATE 900 MG: 900 INJECTION, SOLUTION INTRAVENOUS at 09:00

## 2017-12-19 RX ADMIN — Medication 10 ML: at 09:35

## 2017-12-19 RX ADMIN — PROPOFOL 200 MG: 10 INJECTION, EMULSION INTRAVENOUS at 09:04

## 2017-12-19 ASSESSMENT — LIFESTYLE VARIABLES: TOBACCO_USE: 1

## 2017-12-19 NOTE — DISCHARGE INSTRUCTIONS
A collaboration between [facility] and [facility]  Experts in minimally invasive, targeted treatments performed using imaging guidance    Venous Access Device,  Port Catheter or Tunneled Central Line Placement    Today you had a procedure today to install a venous access device; either a tunneled central vein catheter or a subcutaneous port catheter.    One of our Radiology PAs performed this procedure for you today:  ? Koko Enriquez PA-C  ? MOE August PA-C  ? Jhonatan Alejandro PA-C  ? Linda Schaffer PA-C   ? Per Dumont PA-C    After you go home:  - Drink plenty of fluids.  Generally 6-8 (8 ounce) glasses a day is recommended.  - Resume your regular diet unless otherwise ordered by a medical provider.  - Keep any applied tape/gauze dressings clean and dry.  Change tape/gauze dressings if they get wet or soiled.  - You may shower the following day after procedure, however cover and protect from moisture any tape/gauze dressings.  You may let water hit and run over dried skin glue, but do not scrub.  Pat the area dry after showering.  - Port placement incisions are closed with absorbable suture, meaning they do not need to be removed at a later date, and a topical skin adhesive (skin glue).  This glue will wear off in 7-14 days.  Do not remove before this time.  If 14 days have passed and residual glue is present, you may gently remove it.  - Do not apply gels, lotions, or ointments to the glue site for the first 10 days as this may cause the glue to prematurely soften and fail.  - Do not perform strenuous activities or lift greater than 10 pounds for the next three days.  - If there is bleeding or oozing from the procedure site, apply firm pressure to the area for 5-10 minutes.  If the bleeding continues seek medical advice at the numbers below.  - Mild procedure site discomfort can be treated with an ice pack and over-the-counter pain  relievers.        For 24 hours after any sedation used:  - Relax and take it easy.  No strenuous activities.  - Do not drive or operate machines at home or at work.  - No alcohol consumption.  - Do not make any important or legal decisions.    Call our Interventional Radiology (IR) service if:  - If you start bleeding from the procedure site.  If you do start to bleed from the site, lie down and hold some pressure on the site.  Our radiology provider can help you decide if you need to return to the hospital.  - If you have new or worsening pain related to the procedure.  - If you have concerning swelling at the procedure site.  - If you develop persistent nausea or vomiting.  - If you develop hives or a rash or any unexplained itching.  - If you have a fever of greater than 100.5  F and chills in the first 5 days after procedure.  - Any other concerns related to your procedure.      [facility]  Interventional Radiology (IR)  [address] [place] [address]    Contact Number:  [phone]  (IR control desk)  - Monday - Friday 8:00 am - 4:30 pm    After hours for urgent concerns:  [phone]  - After 4:30 pm Monday - Friday, Weekends and Holidays.   - Ask for Interventional Radiology on-call.  Someone is available 24 hours a day.  - [facility] toll free number:  [phone]      A collaboration between [facility] and [facility]  Experts in minimally invasive, targeted treatments performed using imaging guidance    Venous Access Device,  Port Catheter or Tunneled Central Line Placement    Today you had a procedure today to install a venous access device; either a tunneled central vein catheter or a subcutaneous port catheter.    One of our Radiology PAs performed this procedure for you today:  ? Koko Enriquez PA-C  ? MOE August PA-C  ? Jhonatan Alejandro PA-C  ? Linda Schaffer PA-C   ? Per  BRITTANY Dumont    After you go home:  - Drink plenty of fluids.  Generally 6-8 (8 ounce) glasses a day is recommended.  - Resume your regular diet unless otherwise ordered by a medical provider.  - Keep any applied tape/gauze dressings clean and dry.  Change tape/gauze dressings if they get wet or soiled.  - You may shower the following day after procedure, however cover and protect from moisture any tape/gauze dressings.  You may let water hit and run over dried skin glue, but do not scrub.  Pat the area dry after showering.  - Port placement incisions are closed with absorbable suture, meaning they do not need to be removed at a later date, and a topical skin adhesive (skin glue).  This glue will wear off in 7-14 days.  Do not remove before this time.  If 14 days have passed and residual glue is present, you may gently remove it.  - Do not apply gels, lotions, or ointments to the glue site for the first 10 days as this may cause the glue to prematurely soften and fail.  - Do not perform strenuous activities or lift greater than 10 pounds for the next three days.  - If there is bleeding or oozing from the procedure site, apply firm pressure to the area for 5-10 minutes.  If the bleeding continues seek medical advice at the numbers below.  - Mild procedure site discomfort can be treated with an ice pack and over-the-counter pain relievers.        For 24 hours after any sedation used:  - Relax and take it easy.  No strenuous activities.  - Do not drive or operate machines at home or at work.  - No alcohol consumption.  - Do not make any important or legal decisions.    Call our Interventional Radiology (IR) service if:  - If you start bleeding from the procedure site.  If you do start to bleed from the site, lie down and hold some pressure on the site.  Our radiology provider can help you decide if you need to return to the hospital.  - If you have new or worsening pain related to the procedure.  - If you have  concerning swelling at the procedure site.  - If you develop persistent nausea or vomiting.  - If you develop hives or a rash or any unexplained itching.  - If you have a fever of greater than 100.5  F and chills in the first 5 days after procedure.  - Any other concerns related to your procedure.      [facility]  Interventional Radiology (IR)  [address]  [address]nd Floor, [facility] [place] [address]    Contact Number:  [phone]  (IR control desk)  - Monday - Friday 8:00 am - 4:30 pm    After hours for urgent concerns:  [phone]  - After 4:30 pm Monday - Friday, Weekends and Holidays.   - Ask for Interventional Radiology on-call.  Someone is available 24 hours a day.  - [facility] toll free number:  [phone]    [facility]  Home Care Following Anesthesia  For 24 hours after surgery:  1. Get plenty of rest.  A responsible adult must stay with you for at least 24 hours after you leave the surgery center.  2. Do not drive or use heavy equipment.  If you have weakness or tingling, don't drive or use heavy equipment until this feeling goes away.   3. Do not drink alcohol.   4. Avoid strenuous or risky activities.  Ask for help when climbing stairs.  5. You may feel lightheaded.  IF so, sit for a few minutes before standing.  Have someone help you get up.   6. If you have nausea (feel sick to your stomach): Drink only clear liquids such as apple juice, ginger ale, broth or 7-Up.  Rest may also help.  Be sure to drink enough fluids.  Move to a regular diet as you feel able.   7. You may have a slight fever.  Call the doctor if your fever is over 100 F (37.7 C) (taken under the tongue) or lasts longer than 24 hours.  8. You may have a dry mouth, a sore throat, muscle aches or trouble sleeping. These should go away after 24 hours.  9. Do not make important or legal decisions.               Tips for taking pain medications  To get the best  pain relief possible, remember these points:    Take pain medications as directed, before pain becomes severe.    Pain medication can upset your stomach: taking it with food may help.    Constipation is a common side effect of pain medication. Drink plenty of  fluids.    Eat foods high in fiber. Take a stool softener if recommended by your doctor or pharmacist.    Do not drink alcohol, drive or operate machinery while taking pain medications.    Ask about other ways to control pain, such as with heat, ice or relaxation.    Tylenol/Acetaminophen Consumption  To help encourage the safe use of acetaminophen, the makers of TYLENOL  have lowered the maximum daily dose for single-ingredient Extra Strength TYLENOL  (acetaminophen) products sold in the [place] from 8 pills per day (4,000 mg) to 6 pills per day (3,000 mg). The dosing interval has also changed from 2 pills every 4-6 hours to 2 pills every 6 hours.    If you feel your pain relief is insufficient, you may take Tylenol/Acetaminophen in addition to your narcotic pain medication.     Be careful not to exceed 3,000 mg of Tylenol/Acetaminophen in a 24 hour period from all sources.    If you are taking extra strength Tylenol/acetaminophen (500 mg), the maximum dose is 6 tablets in 24 hours.    If you are taking regular strength acetaminophen (325 mg), the maximum dose is 9 tablets in 24 hours.    Call a doctor for any of the followin. Signs of infection (fever, growing tenderness at the surgery site, a large amount of drainage or bleeding, severe pain, foul-smelling drainage, redness, swelling).  2. It has been over 8 to 10 hours since surgery and you are still not able to urinate (pass water).  3. Headache for over 24 hours.  4. Numbness, tingling or weakness the day after surgery (if you had spinal anesthesia).  Your doctor is:  Lisandro Alejandro                        Or dial [phone] and ask for the resident on call for:  Interventional Radiology  For emergency  care, call the:  [facility]:  [phone] (TTY for hearing impaired: [phone])

## 2017-12-19 NOTE — ANESTHESIA CARE TRANSFER NOTE
Patient: Parker Acevedo Sr    Procedure(s):  Right Chest Port Placement  - Wound Class: I-Clean    Diagnosis: Malabsorption  Diagnosis Additional Information: No value filed.    Anesthesia Type:   General, ETT, RSI     Note:  Airway :Room Air  Patient transferred to:PACU  Comments: Drowsy but oriented  VS stable WNL.   Report to CHRISTY MarieeHandoff Report: Identifed the Patient, Identified the Reponsible Provider, Reviewed the pertinent medical history, Discussed the surgical course, Reviewed Intra-OP anesthesia mangement and issues during anesthesia, Set expectations for post-procedure period and Allowed opportunity for questions and acknowledgement of understanding      Vitals: (Last set prior to Anesthesia Care Transfer)    CRNA VITALS  12/19/2017 0926 - 12/19/2017 1003      12/19/2017             Pulse: 67    SpO2: 100 %    Resp Rate (observed): (!)  1    Resp Rate (set): 10                Electronically Signed By: SAILAJA Brooks CRNA  December 19, 2017  10:03 AM

## 2017-12-19 NOTE — IP AVS SNAPSHOT
[facility]    [address] [place] [address]    Phone:  [phone]    Fax:  [phone]                                       After Visit Summary   12/19/2017    Parker Acevedo Sr    MRN: [id number]           After Visit Summary Signature Page     I have received my discharge instructions, and my questions have been answered. I have discussed any challenges I see with this plan with the nurse or doctor.    ..........................................................................................................................................  Patient/Patient Representative Signature      ..........................................................................................................................................  Patient Representative Print Name and Relationship to Patient    ..................................................               ................................................  Date                                            Time    ..........................................................................................................................................  Reviewed by Signature/Title    ...................................................              ..............................................  Date                                                            Time

## 2017-12-19 NOTE — PROCEDURES
Interventional Radiology Brief Post Procedure Note    Procedure: IR CHEST PORT PLACEMENT > 5 YRS OF AGE [[id number]]    Proceduralist: Jhonatan Alejandro PA-C    Assistant: None    Time Out: Prior to the start of the procedure and with procedural staff participation, I verbally confirmed the patient s identity using two indicators, relevant allergies, that the procedure was appropriate and matched the consent or emergent situation, and that the correct equipment/implants were available. Immediately prior to starting the procedure I conducted the Time Out with the procedural staff and re-confirmed the patient s name, procedure, and site/side. (The Joint Commission universal protocol was followed.)  Yes    Sedation: General Endotracheal Anesthesia (GET) administered and documented by Anesthesia Care Provider    Findings: Completed image-guided placement of 9.5 Czech 21 cm double lumen power-injectable central venous port via right IJ. Aspirates and flushes freely, heparin locked and is ready for immediate use.    Estimated Blood Loss: Less than 10 ml    Fluoroscopy Time: 0.3 minute(s)    Specimens: None    Complications: 1. None     Condition: Stable    Plan: Follow-up per primary team. Return for removal as indicated.    Comments: See dictated procedure note for full details.    Jhonatan Alejandro PA-C  Interventional Radiology  [phone]

## 2017-12-19 NOTE — ANESTHESIA PREPROCEDURE EVALUATION
Anesthesia Evaluation     . Pt has had prior anesthetic. Type: General    No history of anesthetic complications          ROS/MED HX    ENT/Pulmonary:     (+)tobacco use, Past use , . .    Neurologic:       Cardiovascular:     (+) ----. : . . . :. . Previous cardiac testing Echodate:01/16/16results:Interpretation Summary  Mildly (EF 45-50%) reduced left ventricular function is present.  Global right ventricular function is mildly reduced.  No obvious vegetations noted. Would consider a transesophageal echocardiogram  if clinically indicated.  No pericardial effusion.date: results:ECG reviewed date:08/13/15 results:NSR date: results:          METS/Exercise Tolerance:     Hematologic:         Musculoskeletal:         GI/Hepatic:     (+) GERD hiatal hernia, Other GI/Hepatic s/p gastric bypass; dysphagia; malnutrition      Renal/Genitourinary:         Endo:     (+) Obesity, .      Psychiatric:     (+) psychiatric history anxiety and other (comment)      Infectious Disease:         Malignancy:         Other:    (+) No chance of pregnancy C-spine cleared: N/A, H/O Chronic Pain,H/O chronic opiod use , no other significant disability                    Physical Exam  Normal systems: pulmonary    Airway   Mallampati: II  TM distance: >3 FB    Dental   (+) missing    Cardiovascular   Rhythm and rate: regular and normal      Pulmonary    breath sounds clear to auscultation                        Anesthesia Plan      History & Physical Review  History and physical reviewed and following examination; no interval change.    ASA Status:  3 .    NPO Status:  > 8 hours    Plan for General, ETT and RSI with Intravenous and Propofol induction. Maintenance will be TIVA.    PONV prophylaxis:  Ondansetron  Patient sleeps sitting up because otherwise he frequently aspirates gastric contents at night. Has previously had suspected aspiration PNA. Agreeable to a general anesthetic.       Postoperative Care  Postoperative pain management:   Multi-modal analgesia.      Consents  Anesthetic plan, risks, benefits and alternatives discussed with:  Patient..                Procedure: Procedure(s):  Chest Port Placement - Wound Class:     HPI: Parker Acevedo Sr is a 45 year old male with history of Short Gut Syndrome who is scheduled for the above procedure.     PMHx/PSHx:  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)      Chronic abdominal pain      Difficulty swallowing      Gastric ulcer, unspecified as acute or chronic, without mention of hemorrhage, perforation, or obstruction      Gastro-oesophageal reflux disease      Head injury      Hiatal hernia      Other bladder disorder      Other chronic pain      Severe malnutrition (H)     TPN     Short gut syndrome      Tobacco abuse        Past Surgical History:   Procedure Laterality Date     APPENDECTOMY       BACK SURGERY  11/3/2014    curve in the spine     BIOPSY LYMPH NODE CERVICAL N/A 2/20/2015    Procedure: BIOPSY LYMPH NODE CERVICAL;  Surgeon: Baron Scanlon MD;  Location: [facility]     C GASTRIC BYPASS,OBESE<100CM SHAYLEE-EN-Y  2002    lost 300 pounds     CHOLECYSTECTOMY       DISCECTOMY, FUSION CERVICAL ANTERIOR ONE LEVEL, COMBINED N/A 2/15/2017    Procedure: COMBINED DISCECTOMY, FUSION CERVICAL ANTERIOR ONE LEVEL;  Surgeon: Darren Campos MD;  Location: [facility]     ENDOSCOPIC INSERTION TUBE GASTROSTOMY  9/9/2013    Procedure: ENDOSCOPIC INSERTION TUBE GASTROSTOMY;;  Surgeon: Francis Vyas MD;  [facility]: [facility]     ENDOSCOPIC ULTRASOUND UPPER GASTROINTESTINAL TRACT (GI)  4/29/2011    Procedure:ENDOSCOPIC ULTRASOUND UPPER GASTROINTESTINAL TRACT (GI); Both Procedures done Conjointly; Surgeon:NEREIDA HOUSER; [facility]     ENDOSCOPIC ULTRASOUND UPPER GASTROINTESTINAL  TRACT (GI)  9/9/2013    Procedure: ENDOSCOPIC ULTRASOUND UPPER GASTROINTESTINAL TRACT (GI);  Endoscopic Ultrasound Guide Gastrostomy Tube Placement  C-arm;  Surgeon: Noe Lizarraga MD;  [facility]: [facility]     ENDOSCOPY  03/25/11    EGD, [facility]     ENDOSCOPY  08/04/09    Upper Endoscopy, [facility]     ENDOSCOPY  01/05/09    Upper Endoscopy, [facility]     ESOPHAGOSCOPY, GASTROSCOPY, DUODENOSCOPY (EGD), COMBINED  4/20/2011    Procedure:COMBINED ESOPHAGOSCOPY, GASTROSCOPY, DUODENOSCOPY (EGD); Surgeon:BLU VEGA; [facility]     ESOPHAGOSCOPY, GASTROSCOPY, DUODENOSCOPY (EGD), COMBINED  6/15/2011    Procedure:COMBINED ESOPHAGOSCOPY, GASTROSCOPY, DUODENOSCOPY (EGD); Surgeon:BLU VEGA; [facility]     ESOPHAGOSCOPY, GASTROSCOPY, DUODENOSCOPY (EGD), COMBINED  6/12/2013    Procedure: COMBINED ESOPHAGOSCOPY, GASTROSCOPY, DUODENOSCOPY (EGD);;  Surgeon: Blu Vega MD;  [facility]     ESOPHAGOSCOPY, GASTROSCOPY, DUODENOSCOPY (EGD), COMBINED  11/22/2013    Procedure: COMBINED ESOPHAGOSCOPY, GASTROSCOPY, DUODENOSCOPY (EGD);;  Surgeon: Blu Vega MD;  Location:  OR     ESOPHAGOSCOPY, GASTROSCOPY, DUODENOSCOPY (EGD), COMBINED  4/30/2014    Procedure: COMBINED ESOPHAGOSCOPY, GASTROSCOPY, DUODENOSCOPY (EGD);  Surgeon: Blu Vega MD;  [facility]     ESOPHAGOSCOPY, GASTROSCOPY, DUODENOSCOPY (EGD), COMBINED N/A 2/20/2015    Procedure: COMBINED ESOPHAGOSCOPY, GASTROSCOPY, DUODENOSCOPY (EGD), BIOPSY SINGLE OR MULTIPLE;  Surgeon: Baron Scanlon MD;  Location:  OR     ESOPHAGOSCOPY, GASTROSCOPY, DUODENOSCOPY (EGD), COMBINED N/A 9/30/2015    Procedure: COMBINED ESOPHAGOSCOPY, GASTROSCOPY, DUODENOSCOPY (EGD);  Surgeon: Blu Vega MD;  [facility]     GASTRECTOMY  6/22/2012    Procedure: GASTRECTOMY;  Open Approach, Excise Ulcers,Partial Gastrectomy, Esophagojejunostomy, Hiatal Hernia Repair, Extensive Lysis of Adhesions  and Esaphagogastrodudenoscopy.;  Surgeon: Francis Vyas MD;  [facility]     GASTROJEJUNOSTOMY  08/26/09    Extensice enterolysis, partial resect. jejunum, part. resect gastric pouch, gastrojejunostomy anastomosis     HC ESOPH/GAS REFLUX TEST W NASAL IMPED ELECTRODE  8/5/2013    Procedure: ESOPHAGEAL IMPEDENCE FUNCTION TEST 1 HOUR OR LESS;  Surgeon: Halie Lang MD;  [facility]     HEAD & NECK SURGERY  2/15/2017    C5-C6     HERNIA REPAIR  2006    Umbilical hernia     HERNIORRHAPHY HIATAL  6/22/2012    Procedure: HERNIORRHAPHY HIATAL;;  Surgeon: Francis Vyas MD;  [facility]     HERNIORRHAPHY INGUINAL  11/22/2013    Procedure: HERNIORRHAPHY INGUINAL;;  Surgeon: Francis Vyas MD;  [facility]     LAPAROTOMY EXPLORATORY  11/22/2013    Procedure: LAPAROTOMY EXPLORATORY;  Exploratory Laparotomy, Upper Endoscopy, Left Inguinal Hernia Repair;  Surgeon: Francis Vyas MD;  [facility]     PICC INSERTION Right 03/16/2017    5fr DL BioFlo PICC, 42cm (3cm external) in the R medial brachial vein w/ tip in the SVC RA junction.     PICC INSERTION Left 09/23/2017    5fr DL BioFlo PICC, 45cm (1cm external) in the L basilic vein w/ tip in the SVC RA junction.     SHAYLEE EN Y BOWEL  2003     SOFT TISSUE SURGERY       SOFT TISSUE SURGERY       TONSILLECTOMY         Social History   Substance Use Topics     Smoking status: Light Tobacco Smoker     Packs/day: 0.10     Years: 3.00     Types: Cigarettes     Smokeless tobacco: Current User      Comment: I use an e cig every now and than     Alcohol use No      Comment: quit           Allergies   Allergen Reactions     Bactrim [Sulfamethoxazole W/Trimethoprim] Rash     Penicillins Anaphylaxis     Doxycycline Rash     Vancomycin Rash     Rash after receiving vancomycin 3/28/16 (red man's?). Tolerated with slower infusion and diphenhydramine premed.         (Not in a hospital admission)    Current Outpatient Prescriptions  "  Medication Sig Dispense Refill     oxyCODONE (ROXICODONE) 5 MG/5ML solution Take 10-15 mLs (10-15 mg) by mouth every 4 hours as needed for moderate to severe pain Max of 55 mg per day. Fill on/after 12/15/17 not to start untill 12/17/17. 1650 mL 0     fentaNYL (DURAGESIC) 50 mcg/hr 72 hr patch Place 1 patch onto the skin every 48 hours [employer] ONLY. Fill on/after 12/15/17 to start on/after 12/17/17 15 patch 0     Multiple Vitamin (MULTI-VITAMINS) TABS Take 1 tablet by mouth       [START ON 1/5/2018] amphetamine-dextroamphetamine (ADDERALL) 20 MG per tablet Take 1 tablet (20 mg) by mouth 2 times daily 60 tablet 0     amphetamine-dextroamphetamine (ADDERALL) 20 MG per tablet Take 1 tablet (20 mg) by mouth 2 times daily 60 tablet 0     amphetamine-dextroamphetamine (ADDERALL) 20 MG per tablet Take 1 tablet (20 mg) by mouth 2 times daily 60 tablet 0     nystatin-triamcinolone (MYCOLOG II) cream Apply topically 2 times daily as needed 60 g 5     mupirocin (BACTROBAN) 2 % ointment Apply topically 3 times daily 30 g 3     Needle, Disp, (BD DISP NEEDLES) 27G X 1/2\" MISC 1 Device every 30 days Use for cyanocobalamin injection once q 30 days. 3 each 4     sucralfate (CARAFATE) 1 GM/10ML suspension Take 10 mLs (1 g) by mouth 4 times daily 1200 mL 11     ondansetron (ZOFRAN-ODT) 8 MG ODT tab Take 1 tablet (8 mg) by mouth every 8 hours as needed for nausea 90 tablet 3     cyanocobalamin (VITAMIN B12) 1000 MCG/ML injection Inject 1 mL (1,000 mcg) into the muscle every 30 days 1 mL 11     lidocaine-prilocaine (EMLA) cream Apply topically as needed for moderate pain 30 g 11     albuterol (PROAIR HFA/PROVENTIL HFA/VENTOLIN HFA) 108 (90 BASE) MCG/ACT Inhaler Inhale 2 puffs into the lungs every 4 hours as needed for shortness of breath / dyspnea or wheezing (Patient not taking: Reported on 11/6/2017) 1 Inhaler 3     vitamin D (ERGOCALCIFEROL) 76375 UNIT capsule Take 1 capsule (50,000 Units) by mouth every 7 days 12 capsule 3     " "Carvedilol (COREG PO) Take 12.5 mg by mouth 2 times daily       cyanocobalamin (VITAMIN B12) 1000 MCG/ML injection Inject 1 mL into the muscle every 30 days       naloxone (NARCAN) nasal spray Spray 1 spray (4 mg) into one nostril alternating nostrils as needed for opioid reversal (every 2-3 minutes until assistance arrives.) 0.2 mL 0     morphine 0.1% in intrasite topical gel Apply as needed prior to accessing the port site. 100 g 0     [facility] PATIENT Contact [facility] for patient specific medication information at [phone] on admission and discharge from the hospital.  Phones are answered 24 hours a day 7 days a week 365 days a year.    Providers - Choose \"CONTINUE HOME MED (no script)\" at discharge if patient treatment with home infusion will continue.       order for DME Equipment being ordered: Bilateral knee high chronic venous insufficiency stockings--  mild-moderate pressures. 4 each 5     [facility] PATIENT Contact [facility] for patient specific medication information at [phone] on admission and discharge from the hospital.  Phones are answered 24 hours a day 7 days a week 365 days a year.    Providers - Choose \"CONTINUE HOME MED (no script)\" at discharge if patient treatment with home infusion will continue.       order for DME Injection Supplies for Vitamin B12: 3cc syringes w/ 27 gauge needles, 1/2 inch length 12 each 0     [DISCONTINUED] NO ACTIVE MEDICATIONS . 0 0       Objective:   There were no vitals taken for this visit.  Lab Results   Component Value Date    WBC 9.2 11/16/2017    HGB 13.1 (L) 11/16/2017    HCT 40.8 11/16/2017     11/16/2017     12/18/2017    POTASSIUM 4.1 12/18/2017    CHLORIDE 110 (H) 12/18/2017    CO2 27 12/18/2017    BUN 12 12/18/2017    CR 0.87 12/18/2017    GLC 86 12/18/2017    SED 31 (H) 09/24/2017    TROPI <0.015 09/19/2017    AST 26 12/18/2017    ALT 27 12/18/2017    ALKPHOS 77 " 12/18/2017    BILITOTAL 0.4 12/18/2017    INR 1.07 09/22/2017       Previous Intubation:  11/22/13; 1305; Airway Size: 8;  Cuffed;  Oral endotracheal tube;  Blade Type: Lianet;  Blade Size: 4;  Place by: Toya RIZO;  Insertion Attempts: 1;  Secured at (cm)to lip: 22 cm;  Breath Sounds: Equal, clear and bilateral;  End Tidal CO2: Present;  Dentition:  (edentulous);  Grade View of Cords: 1    Assessment: Parker Acevedo Sr is a 45 year old male with history of Short Gut Syndrome who is scheduled for Procedure(s):  Chest Port Placement - Wound Class: .

## 2017-12-19 NOTE — IP AVS SNAPSHOT
MRN:[id number]                      After Visit Summary   12/19/2017    Parker Acevedo     MRN: [id number]           Thank you!     Thank you for choosing [facility] for your care. Our goal is always to provide you with excellent care. Hearing back from our patients is one way we can continue to improve our services. Please take a few minutes to complete the written survey that you may receive in the mail after you visit with us. Thank you!        Patient Information     Date Of Birth          1970        About your hospital stay     You were admitted on:  December 19, 2017 [facility]    You were discharged on:  December 19, 2017       Who to Call     For medical emergencies, please call 911.  For non-urgent questions about your medical care, please call your primary care provider or clinic, [phone]  For questions related to your surgery, please call your surgery clinic        Attending Provider     Provider Lisandro Nassar PA-C Physician Assistant       Primary Care Provider Office Phone # Fax #    Esteban Daly [phone]      Your next 10 appointments already scheduled     Jan 03, 2018  2:00 PM CST   Return Visit with Patti Milligan MD   [facility] ([facility])    [address] [address] [phone]              Further instructions from your care team         A collaboration between [facility] and [facility]  Experts in minimally invasive, targeted treatments performed using imaging guidance    Venous Access Device,  Port Catheter or Tunneled Central Line Placement    Today you had a procedure today to install a venous access device; either a tunneled central vein catheter or a subcutaneous port catheter.    One of our Radiology PAs performed this procedure  for you today:  ? Koko Enriquez PA-C  ? MOE August PA-C  ? Jhonatan Alejandro PA-C  ? Linda Schaffer PA-C   ? Per Dumont PA-C    After you go home:  - Drink plenty of fluids.  Generally 6-8 (8 ounce) glasses a day is recommended.  - Resume your regular diet unless otherwise ordered by a medical provider.  - Keep any applied tape/gauze dressings clean and dry.  Change tape/gauze dressings if they get wet or soiled.  - You may shower the following day after procedure, however cover and protect from moisture any tape/gauze dressings.  You may let water hit and run over dried skin glue, but do not scrub.  Pat the area dry after showering.  - Port placement incisions are closed with absorbable suture, meaning they do not need to be removed at a later date, and a topical skin adhesive (skin glue).  This glue will wear off in 7-14 days.  Do not remove before this time.  If 14 days have passed and residual glue is present, you may gently remove it.  - Do not apply gels, lotions, or ointments to the glue site for the first 10 days as this may cause the glue to prematurely soften and fail.  - Do not perform strenuous activities or lift greater than 10 pounds for the next three days.  - If there is bleeding or oozing from the procedure site, apply firm pressure to the area for 5-10 minutes.  If the bleeding continues seek medical advice at the numbers below.  - Mild procedure site discomfort can be treated with an ice pack and over-the-counter pain relievers.        For 24 hours after any sedation used:  - Relax and take it easy.  No strenuous activities.  - Do not drive or operate machines at home or at work.  - No alcohol consumption.  - Do not make any important or legal decisions.    Call our Interventional Radiology (IR) service if:  - If you start bleeding from the procedure site.  If you do start to bleed from the site, lie down and hold some pressure on the site.  Our radiology provider can help you decide  if you need to return to the hospital.  - If you have new or worsening pain related to the procedure.  - If you have concerning swelling at the procedure site.  - If you develop persistent nausea or vomiting.  - If you develop hives or a rash or any unexplained itching.  - If you have a fever of greater than 100.5  F and chills in the first 5 days after procedure.  - Any other concerns related to your procedure.      [facility]  Interventional Radiology (IR)  [address]  2nd Floor, [facility] [place] [address]    Contact Number:  [phone]  (IR control desk)  - Monday - Friday 8:00 am - 4:30 pm    After hours for urgent concerns:  [phone]  - After 4:30 pm Monday - Friday, Weekends and Holidays.   - Ask for Interventional Radiology on-call.  Someone is available 24 hours a day.  - [facility] toll free number:  [phone]      A collaboration between [facility] and [facility]  Experts in minimally invasive, targeted treatments performed using imaging guidance    Venous Access Device,  Port Catheter or Tunneled Central Line Placement    Today you had a procedure today to install a venous access device; either a tunneled central vein catheter or a subcutaneous port catheter.    One of our Radiology PAs performed this procedure for you today:  ? Koko Enriquez PA-C  ? MOE August PA-C  ? Jhonatan Alejandro PA-C  ? Linda Schaffer PA-C   ? Per Dumont PA-C    After you go home:  - Drink plenty of fluids.  Generally 6-8 (8 ounce) glasses a day is recommended.  - Resume your regular diet unless otherwise ordered by a medical provider.  - Keep any applied tape/gauze dressings clean and dry.  Change tape/gauze dressings if they get wet or soiled.  - You may shower the following day after procedure, however cover and protect from moisture any tape/gauze dressings.  You may let water hit and run over dried skin glue,  but do not scrub.  Pat the area dry after showering.  - Port placement incisions are closed with absorbable suture, meaning they do not need to be removed at a later date, and a topical skin adhesive (skin glue).  This glue will wear off in 7-14 days.  Do not remove before this time.  If 14 days have passed and residual glue is present, you may gently remove it.  - Do not apply gels, lotions, or ointments to the glue site for the first 10 days as this may cause the glue to prematurely soften and fail.  - Do not perform strenuous activities or lift greater than 10 pounds for the next three days.  - If there is bleeding or oozing from the procedure site, apply firm pressure to the area for 5-10 minutes.  If the bleeding continues seek medical advice at the numbers below.  - Mild procedure site discomfort can be treated with an ice pack and over-the-counter pain relievers.        For 24 hours after any sedation used:  - Relax and take it easy.  No strenuous activities.  - Do not drive or operate machines at home or at work.  - No alcohol consumption.  - Do not make any important or legal decisions.    Call our Interventional Radiology (IR) service if:  - If you start bleeding from the procedure site.  If you do start to bleed from the site, lie down and hold some pressure on the site.  Our radiology provider can help you decide if you need to return to the hospital.  - If you have new or worsening pain related to the procedure.  - If you have concerning swelling at the procedure site.  - If you develop persistent nausea or vomiting.  - If you develop hives or a rash or any unexplained itching.  - If you have a fever of greater than 100.5  F and chills in the first 5 days after procedure.  - Any other concerns related to your procedure.      [facility]  Interventional Radiology (IR)  [address] [place] [address]    Contact Number:  [phone]  (IR  control desk)  - Monday - Friday 8:00 am - 4:30 pm    After hours for urgent concerns:  [phone]  - After 4:30 pm Monday - Friday, Weekends and Holidays.   - Ask for Interventional Radiology on-call.  Someone is available 24 hours a day.  - [facility] toll free number:  [phone]    [facility]  Home Care Following Anesthesia  For 24 hours after surgery:  1. Get plenty of rest.  A responsible adult must stay with you for at least 24 hours after you leave the surgery center.  2. Do not drive or use heavy equipment.  If you have weakness or tingling, don't drive or use heavy equipment until this feeling goes away.   3. Do not drink alcohol.   4. Avoid strenuous or risky activities.  Ask for help when climbing stairs.  5. You may feel lightheaded.  IF so, sit for a few minutes before standing.  Have someone help you get up.   6. If you have nausea (feel sick to your stomach): Drink only clear liquids such as apple juice, ginger ale, broth or 7-Up.  Rest may also help.  Be sure to drink enough fluids.  Move to a regular diet as you feel able.   7. You may have a slight fever.  Call the doctor if your fever is over 100 F (37.7 C) (taken under the tongue) or lasts longer than 24 hours.  8. You may have a dry mouth, a sore throat, muscle aches or trouble sleeping. These should go away after 24 hours.  9. Do not make important or legal decisions.               Tips for taking pain medications  To get the best pain relief possible, remember these points:    Take pain medications as directed, before pain becomes severe.    Pain medication can upset your stomach: taking it with food may help.    Constipation is a common side effect of pain medication. Drink plenty of  fluids.    Eat foods high in fiber. Take a stool softener if recommended by your doctor or pharmacist.    Do not drink alcohol, drive or operate machinery while taking pain medications.    Ask about other ways to control  pain, such as with heat, ice or relaxation.    Tylenol/Acetaminophen Consumption  To help encourage the safe use of acetaminophen, the makers of TYLENOL  have lowered the maximum daily dose for single-ingredient Extra Strength TYLENOL  (acetaminophen) products sold in the [place] from 8 pills per day (4,000 mg) to 6 pills per day (3,000 mg). The dosing interval has also changed from 2 pills every 4-6 hours to 2 pills every 6 hours.    If you feel your pain relief is insufficient, you may take Tylenol/Acetaminophen in addition to your narcotic pain medication.     Be careful not to exceed 3,000 mg of Tylenol/Acetaminophen in a 24 hour period from all sources.    If you are taking extra strength Tylenol/acetaminophen (500 mg), the maximum dose is 6 tablets in 24 hours.    If you are taking regular strength acetaminophen (325 mg), the maximum dose is 9 tablets in 24 hours.    Call a doctor for any of the followin. Signs of infection (fever, growing tenderness at the surgery site, a large amount of drainage or bleeding, severe pain, foul-smelling drainage, redness, swelling).  2. It has been over 8 to 10 hours since surgery and you are still not able to urinate (pass water).  3. Headache for over 24 hours.  4. Numbness, tingling or weakness the day after surgery (if you had spinal anesthesia).  Your doctor is:  Lisandro Alejandro                        Or dial [phone] and ask for the resident on call for:  Interventional Radiology  For emergency care, call the:  [facility]:  [phone] (TTY for hearing impaired: [phone])                Pending Results     No orders found from 2017 to 2017.            Admission Information     Date & Time Provider Department Dept. Phone    2017 Lisandro Alejandro PA-C [facility] [phone]      Your Vitals Were     Blood Pressure Pulse Temperature Respirations Height Weight    116/75 60 97.7  F (36.5  C)  "(Temporal) 16 1.803 m (5' 11\") 89.6 kg (197 lb 9.6 oz)    Pulse Oximetry BMI (Body Mass Index)                99% 27.56 kg/m2          [employer] Information     [employer] gives you secure access to your electronic health record. If you see a primary care provider, you can also send messages to your care team and make appointments. If you have questions, please call your primary care clinic.  If you do not have a primary care provider, please call [phone] and they will assist you.      [employer] is an electronic gateway that provides easy, online access to your medical records. With [employer], you can request a clinic appointment, read your test results, renew a prescription or communicate with your care team.     To access your existing account, please contact your [facility] or call [phone] for assistance.        Care EveryWhere ID     This is your Care EveryWhere ID. This could be used by other organizations to access your [place] medical records  [id number]        Equal Access to Services     KATHRYN GUZMAN : Hadii kameron huff hadasho Soomaali, waaxda luqadaha, qaybta kaalmada adeegyada, carmela rizvi. So [facility] [phone].    ATENCIÓN: Si habla español, tiene a hicks disposición servicios gratuitos de asistencia lingüística. Llame al [phone].    We comply with applicable federal civil rights laws and [place] laws. We do not discriminate on the basis of race, color, national origin, age, disability, sex, sexual orientation, or gender identity.               Review of your medicines      UNREVIEWED medicines. Ask your doctor about these medicines        Dose / Directions    albuterol 108 (90 BASE) MCG/ACT Inhaler   Commonly known as:  PROAIR HFA/PROVENTIL HFA/VENTOLIN HFA   Used for:  Aspiration pneumonitis (H)        Dose:  2 puff   Inhale 2 puffs into the lungs every 4 hours as needed for shortness of breath / dyspnea or wheezing   Quantity:  1 Inhaler " "  Refills:  3       * amphetamine-dextroamphetamine 20 MG per tablet   Commonly known as:  ADDERALL   Used for:  ADHD (attention deficit hyperactivity disorder), inattentive type        Dose:  20 mg   Take 1 tablet (20 mg) by mouth 2 times daily   Quantity:  60 tablet   Refills:  0       * amphetamine-dextroamphetamine 20 MG per tablet   Commonly known as:  ADDERALL   Used for:  ADHD (attention deficit hyperactivity disorder), inattentive type        Dose:  20 mg   Take 1 tablet (20 mg) by mouth 2 times daily   Quantity:  60 tablet   Refills:  0       * amphetamine-dextroamphetamine 20 MG per tablet   Commonly known as:  ADDERALL   Used for:  ADHD (attention deficit hyperactivity disorder), inattentive type        Dose:  20 mg   Start taking on:  1/5/2018   Take 1 tablet (20 mg) by mouth 2 times daily   Quantity:  60 tablet   Refills:  0       COREG PO        Dose:  12.5 mg   Take 12.5 mg by mouth 2 times daily   Refills:  0       * cyanocobalamin 1000 MCG/ML injection   Commonly known as:  VITAMIN B12        Dose:  1 mL   Inject 1 mL into the muscle every 30 days   Refills:  0       * cyanocobalamin 1000 MCG/ML injection   Commonly known as:  VITAMIN B12   Used for:  Bariatric surgery status        Dose:  1 mL   Inject 1 mL (1,000 mcg) into the muscle every 30 days   Quantity:  1 mL   Refills:  11       * [facility] HOME [facility]ION MANAGED PATIENT   Used for:  S/P bariatric surgery        Contact [facility] for patient specific medication information at [phone] on admission and discharge from the hospital.  Phones are answered 24 hours a day 7 days a week 365 days a year.  Providers - Choose \"CONTINUE HOME MED (no script)\" at discharge if patient treatment with home infusion will continue.   Refills:  0       * [facility]ION MANAGED PATIENT   Used for:  Severe malnutrition (H)        Contact [facility] for patient specific medication information at [phone] on admission and " "discharge from the hospital.  Phones are answered 24 hours a day 7 days a week 365 days a year.  Providers - Choose \"CONTINUE HOME MED (no script)\" at discharge if patient treatment with home infusion will continue.   Refills:  0       fentaNYL 50 mcg/hr 72 hr patch   Commonly known as:  DURAGESIC   Used for:  Chronic abdominal pain        Dose:  1 patch   Place 1 patch onto the skin every 48 hours MYLAN BRAND ONLY. Fill on/after 12/15/17 to start on/after 12/17/17   Quantity:  15 patch   Refills:  0       lidocaine-prilocaine cream   Commonly known as:  EMLA   Used for:  Pain following surgery or procedure        Apply topically as needed for moderate pain   Quantity:  30 g   Refills:  11       morphine 0.1% in intrasite topical gel   Used for:  Pain following surgery or procedure        Apply as needed prior to accessing the port site.   Quantity:  100 g   Refills:  0       MULTI-VITAMINS Tabs        Dose:  1 tablet   Take 1 tablet by mouth   Refills:  0       mupirocin 2 % ointment   Commonly known as:  BACTROBAN   Used for:  Skin infection        Apply topically 3 times daily   Quantity:  30 g   Refills:  3       naloxone nasal spray   Commonly known as:  NARCAN   Used for:  Encounter for long-term opiate analgesic use, Chronic abdominal pain        Dose:  4 mg   Spray 1 spray (4 mg) into one nostril alternating nostrils as needed for opioid reversal (every 2-3 minutes until assistance arrives.)   Quantity:  0.2 mL   Refills:  0       nystatin-triamcinolone cream   Commonly known as:  MYCOLOG II   Used for:  Skin irritation        Apply topically 2 times daily as needed   Quantity:  60 g   Refills:  5       ondansetron 8 MG ODT tab   Commonly known as:  ZOFRAN-ODT   Used for:  Nausea        Dose:  8 mg   Take 1 tablet (8 mg) by mouth every 8 hours as needed for nausea   Quantity:  90 tablet   Refills:  3       oxyCODONE 5 MG/5ML solution   Commonly known as:  ROXICODONE   Used for:  Encounter for long-term " "opiate analgesic use        Dose:  10-15 mg   Take 10-15 mLs (10-15 mg) by mouth every 4 hours as needed for moderate to severe pain Max of 55 mg per day. Fill on/after 12/15/17 not to start untill 12/17/17.   Quantity:  1650 mL   Refills:  0       sucralfate 1 GM/10ML suspension   Commonly known as:  CARAFATE   Used for:  Nausea        Dose:  1 g   Take 10 mLs (1 g) by mouth 4 times daily   Quantity:  1200 mL   Refills:  11       vitamin D 06422 UNIT capsule   Commonly known as:  ERGOCALCIFEROL   Used for:  Vitamin D deficiency        Dose:  66727 Units   Take 1 capsule (50,000 Units) by mouth every 7 days   Quantity:  12 capsule   Refills:  3       * Notice:  This list has 7 medication(s) that are the same as other medications prescribed for you. Read the directions carefully, and ask your doctor or other care provider to review them with you.      CONTINUE these medicines which have NOT CHANGED        Dose / Directions    Needle (Disp) 27G X 1/2\" Misc   Commonly known as:  BD DISP NEEDLES   Used for:  Bariatric surgery status        Dose:  1 Device   1 Device every 30 days Use for cyanocobalamin injection once q 30 days.   Quantity:  3 each   Refills:  4       * order for DME   Used for:  Bariatric surgery status        Injection Supplies for Vitamin B12: 3cc syringes w/ 27 gauge needles, 1/2 inch length   Quantity:  12 each   Refills:  0       * order for DME   Used for:  Bilateral edema of lower extremity        Equipment being ordered: Bilateral knee high chronic venous insufficiency stockings--  mild-moderate pressures.   Quantity:  4 each   Refills:  5       * Notice:  This list has 2 medication(s) that are the same as other medications prescribed for you. Read the directions carefully, and ask your doctor or other care provider to review them with you.             Protect others around you: Learn how to safely use, store and throw away your medicines at www.disposemymeds.org.             Medication List: " "This is a list of all your medications and when to take them. Check marks below indicate your daily home schedule. Keep this list as a reference.      Medications           Morning Afternoon Evening Bedtime As Needed    albuterol 108 (90 BASE) MCG/ACT Inhaler   Commonly known as:  PROAIR HFA/PROVENTIL HFA/VENTOLIN HFA   Inhale 2 puffs into the lungs every 4 hours as needed for shortness of breath / dyspnea or wheezing                                * amphetamine-dextroamphetamine 20 MG per tablet   Commonly known as:  ADDERALL   Take 1 tablet (20 mg) by mouth 2 times daily                                * amphetamine-dextroamphetamine 20 MG per tablet   Commonly known as:  ADDERALL   Take 1 tablet (20 mg) by mouth 2 times daily                                * amphetamine-dextroamphetamine 20 MG per tablet   Commonly known as:  ADDERALL   Take 1 tablet (20 mg) by mouth 2 times daily   Start taking on:  1/5/2018                                COREG PO   Take 12.5 mg by mouth 2 times daily                                * cyanocobalamin 1000 MCG/ML injection   Commonly known as:  VITAMIN B12   Inject 1 mL into the muscle every 30 days                                * cyanocobalamin 1000 MCG/ML injection   Commonly known as:  VITAMIN B12   Inject 1 mL (1,000 mcg) into the muscle every 30 days                                * [facility]ION MANAGED PATIENT   Contact [facility] for patient specific medication information at [phone] on admission and discharge from the hospital.  Phones are answered 24 hours a day 7 days a week 365 days a year.  Providers - Choose \"CONTINUE HOME MED (no script)\" at discharge if patient treatment with home infusion will continue.                                * [facility]ION MANAGED PATIENT   Contact [facility] for patient specific medication information at [phone] on admission and discharge from the hospital.  Phones are answered 24 " "hours a day 7 days a week 365 days a year.  Providers - Choose \"CONTINUE HOME MED (no script)\" at discharge if patient treatment with home infusion will continue.                                fentaNYL 50 mcg/hr 72 hr patch   Commonly known as:  DURAGESIC   Place 1 patch onto the skin every 48 hours MYLAN BRAND ONLY. Fill on/after 12/15/17 to start on/after 12/17/17                                lidocaine-prilocaine cream   Commonly known as:  EMLA   Apply topically as needed for moderate pain                                morphine 0.1% in intrasite topical gel   Apply as needed prior to accessing the port site.                                MULTI-VITAMINS Tabs   Take 1 tablet by mouth                                mupirocin 2 % ointment   Commonly known as:  BACTROBAN   Apply topically 3 times daily                                naloxone nasal spray   Commonly known as:  NARCAN   Spray 1 spray (4 mg) into one nostril alternating nostrils as needed for opioid reversal (every 2-3 minutes until assistance arrives.)                                Needle (Disp) 27G X 1/2\" Misc   Commonly known as:  BD DISP NEEDLES   1 Device every 30 days Use for cyanocobalamin injection once q 30 days.                                nystatin-triamcinolone cream   Commonly known as:  MYCOLOG II   Apply topically 2 times daily as needed                                ondansetron 8 MG ODT tab   Commonly known as:  ZOFRAN-ODT   Take 1 tablet (8 mg) by mouth every 8 hours as needed for nausea                                * order for DME   Injection Supplies for Vitamin B12: 3cc syringes w/ 27 gauge needles, 1/2 inch length                                * order for DME   Equipment being ordered: Bilateral knee high chronic venous insufficiency stockings--  mild-moderate pressures.                                oxyCODONE 5 MG/5ML solution   Commonly known as:  ROXICODONE   Take 10-15 mLs (10-15 mg) by mouth every 4 hours as needed " for moderate to severe pain Max of 55 mg per day. Fill on/after 12/15/17 not to start untill 12/17/17.                                sucralfate 1 GM/10ML suspension   Commonly known as:  CARAFATE   Take 10 mLs (1 g) by mouth 4 times daily                                vitamin D 07594 UNIT capsule   Commonly known as:  ERGOCALCIFEROL   Take 1 capsule (50,000 Units) by mouth every 7 days                                * Notice:  This list has 9 medication(s) that are the same as other medications prescribed for you. Read the directions carefully, and ask your doctor or other care provider to review them with you.

## 2017-12-19 NOTE — ANESTHESIA POSTPROCEDURE EVALUATION
Patient: Parker Acevedo Sr    Procedure(s):  Right Chest Port Placement  - Wound Class: I-Clean    Diagnosis:Malabsorption  Diagnosis Additional Information: No value filed.    Anesthesia Type:  General, ETT, RSI    Note:  Anesthesia Post Evaluation    Patient location during evaluation: PACU  Patient participation: Able to fully participate in evaluation  Level of consciousness: awake and alert  Pain management: adequate  Airway patency: patent  Cardiovascular status: hemodynamically stable  Respiratory status: nonlabored ventilation, spontaneous ventilation and room air  Hydration status: euvolemic  PONV: none     Anesthetic complications: None          Last vitals:  Vitals:    12/19/17 1007 12/19/17 1015 12/19/17 1030   BP: 112/68 116/75 119/71   Pulse:      Resp: 16 16 14   Temp: 36.3  C (97.4  F) 36.4  C (97.6  F) 36.4  C (97.6  F)   SpO2: 99% 99%          Electronically Signed By: Benton Elkins MD  December 19, 2017  1:04 PM

## 2017-12-19 NOTE — PROGRESS NOTES
This is a recent snapshot of the patient's [facility] medical record.  For current drug dose and complete information and questions, call [phone] or In Basket pool, fv home infusion ([id number])  CSN Number:  [id number]

## 2017-12-20 ENCOUNTER — HOME INFUSION (PRE-WILLOW HOME INFUSION) (OUTPATIENT)
Dept: PHARMACY | Facility: CLINIC | Age: 47
End: 2017-12-20

## 2017-12-21 ENCOUNTER — HOME INFUSION (PRE-WILLOW HOME INFUSION) (OUTPATIENT)
Dept: PHARMACY | Facility: CLINIC | Age: 47
End: 2017-12-21

## 2017-12-21 LAB
ANION GAP SERPL CALCULATED.3IONS-SCNC: 7 MMOL/L (ref 3–14)
BUN SERPL-MCNC: 15 MG/DL (ref 7–30)
CALCIUM SERPL-MCNC: 8.9 MG/DL (ref 8.5–10.1)
CHLORIDE SERPL-SCNC: 105 MMOL/L (ref 94–109)
CO2 SERPL-SCNC: 30 MMOL/L (ref 20–32)
CREAT SERPL-MCNC: 0.75 MG/DL (ref 0.66–1.25)
GFR SERPL CREATININE-BSD FRML MDRD: >90 ML/MIN/1.7M2
GLUCOSE SERPL-MCNC: 86 MG/DL (ref 70–99)
MAGNESIUM SERPL-MCNC: 2.2 MG/DL (ref 1.6–2.3)
PHOSPHATE SERPL-MCNC: 3.8 MG/DL (ref 2.5–4.5)
POTASSIUM SERPL-SCNC: 4.2 MMOL/L (ref 3.4–5.3)
SODIUM SERPL-SCNC: 142 MMOL/L (ref 133–144)

## 2017-12-21 PROCEDURE — 80048 BASIC METABOLIC PNL TOTAL CA: CPT | Performed by: SURGERY

## 2017-12-21 PROCEDURE — 83735 ASSAY OF MAGNESIUM: CPT | Performed by: SURGERY

## 2017-12-21 PROCEDURE — 84100 ASSAY OF PHOSPHORUS: CPT | Performed by: SURGERY

## 2017-12-21 NOTE — PROGRESS NOTES
This is a recent snapshot of the patient's [facility] medical record.  For current drug dose and complete information and questions, call [phone] or In Basket pool, fv home infusion ([id number])  CSN Number:  [id number]

## 2017-12-22 ENCOUNTER — HOME INFUSION (PRE-WILLOW HOME INFUSION) (OUTPATIENT)
Dept: PHARMACY | Facility: CLINIC | Age: 47
End: 2017-12-22

## 2017-12-23 ENCOUNTER — MYC MEDICAL ADVICE (OUTPATIENT)
Dept: PALLIATIVE MEDICINE | Facility: CLINIC | Age: 47
End: 2017-12-23

## 2017-12-25 ENCOUNTER — HOME INFUSION (PRE-WILLOW HOME INFUSION) (OUTPATIENT)
Dept: PHARMACY | Facility: CLINIC | Age: 47
End: 2017-12-25

## 2017-12-26 ENCOUNTER — TELEPHONE (OUTPATIENT)
Dept: FAMILY MEDICINE | Facility: CLINIC | Age: 47
End: 2017-12-26

## 2017-12-26 NOTE — H&P
Interventional Radiology Pre-Procedure H&P Assessment     Parker Acevedo   [id number]  1970 12/19/2017     Reason for procedure: Vascular access for TPN    History: Patient s/p Shaylee-en-Y now with short gut syndrome and malabsorption requiring TPN. His previous 3 ports were all removed due to infection.     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)      Chronic abdominal pain      Difficulty swallowing      Gastric ulcer, unspecified as acute or chronic, without mention of hemorrhage, perforation, or obstruction      Gastro-oesophageal reflux disease      Head injury      Hiatal hernia      Other bladder disorder      Other chronic pain      Severe malnutrition (H)     TPN     Short gut syndrome      Tobacco abuse      Past Surgical History:   Procedure Laterality Date     APPENDECTOMY       BACK SURGERY  11/3/2014    curve in the spine     BIOPSY LYMPH NODE CERVICAL N/A 2/20/2015    Procedure: BIOPSY LYMPH NODE CERVICAL;  Surgeon: Baron Scanlon MD;  Location: [facility] OR     C GASTRIC BYPASS,OBESE<100CM SHAYLEE-EN-Y  2002    lost 300 pounds     CHOLECYSTECTOMY       DISCECTOMY, FUSION CERVICAL ANTERIOR ONE LEVEL, COMBINED N/A 2/15/2017    Procedure: COMBINED DISCECTOMY, FUSION CERVICAL ANTERIOR ONE LEVEL;  Surgeon: Darren Campos MD;  Location: [facility]     ENDOSCOPIC INSERTION TUBE GASTROSTOMY  9/9/2013    Procedure: ENDOSCOPIC INSERTION TUBE GASTROSTOMY;;  Surgeon: Francis Vyas MD;  [facility]     ENDOSCOPIC ULTRASOUND UPPER GASTROINTESTINAL TRACT (GI)  4/29/2011    Procedure:ENDOSCOPIC ULTRASOUND UPPER GASTROINTESTINAL TRACT (GI); Both Procedures done Conjointly; Surgeon:NEREIDA HOUSER; [facility]     ENDOSCOPIC ULTRASOUND UPPER GASTROINTESTINAL TRACT (GI)   9/9/2013    Procedure: ENDOSCOPIC ULTRASOUND UPPER GASTROINTESTINAL TRACT (GI);  Endoscopic Ultrasound Guide Gastrostomy Tube Placement  C-arm;  Surgeon: Noe Lizarraga MD;  [facility]     ENDOSCOPY  03/25/11    EGD, [facility]     ENDOSCOPY  08/04/09    Upper Endoscopy, [facility]     ENDOSCOPY  01/05/09    Upper Endoscopy, [facility]     ESOPHAGOSCOPY, GASTROSCOPY, DUODENOSCOPY (EGD), COMBINED  4/20/2011    Procedure:COMBINED ESOPHAGOSCOPY, GASTROSCOPY, DUODENOSCOPY (EGD); Surgeon:BLU VEGA; [facility]     ESOPHAGOSCOPY, GASTROSCOPY, DUODENOSCOPY (EGD), COMBINED  6/15/2011    Procedure:COMBINED ESOPHAGOSCOPY, GASTROSCOPY, DUODENOSCOPY (EGD); Surgeon:BLU VEGA; [facility]     ESOPHAGOSCOPY, GASTROSCOPY, DUODENOSCOPY (EGD), COMBINED  6/12/2013    Procedure: COMBINED ESOPHAGOSCOPY, GASTROSCOPY, DUODENOSCOPY (EGD);;  Surgeon: Blu Vega MD;  [facility]     ESOPHAGOSCOPY, GASTROSCOPY, DUODENOSCOPY (EGD), COMBINED  11/22/2013    Procedure: COMBINED ESOPHAGOSCOPY, GASTROSCOPY, DUODENOSCOPY (EGD);;  Surgeon: Blu Vega MD;  [facility]     ESOPHAGOSCOPY, GASTROSCOPY, DUODENOSCOPY (EGD), COMBINED  4/30/2014    Procedure: COMBINED ESOPHAGOSCOPY, GASTROSCOPY, DUODENOSCOPY (EGD);  Surgeon: Blu Vega MD;  [facility]     ESOPHAGOSCOPY, GASTROSCOPY, DUODENOSCOPY (EGD), COMBINED N/A 2/20/2015    Procedure: COMBINED ESOPHAGOSCOPY, GASTROSCOPY, DUODENOSCOPY (EGD), BIOPSY SINGLE OR MULTIPLE;  Surgeon: Baron Scanlon MD;  Location:  OR     ESOPHAGOSCOPY, GASTROSCOPY, DUODENOSCOPY (EGD), COMBINED N/A 9/30/2015    Procedure: COMBINED ESOPHAGOSCOPY, GASTROSCOPY, DUODENOSCOPY (EGD);  Surgeon: Blu Vega MD;  [facility]     GASTRECTOMY  6/22/2012    Procedure: GASTRECTOMY;  Open Approach, Excise Ulcers,Partial Gastrectomy, Esophagojejunostomy, Hiatal Hernia Repair, Extensive Lysis of Adhesions and  Esaphagogastrodudenoscopy.;  Surgeon: Francis Vyas MD;  [facility]     GASTROJEJUNOSTOMY  08/26/09    Extensice enterolysis, partial resect. jejunum, part. resect gastric pouch, gastrojejunostomy anastomosis     HC ESOPH/GAS REFLUX TEST W NASAL IMPED ELECTRODE  8/5/2013    Procedure: ESOPHAGEAL IMPEDENCE FUNCTION TEST 1 HOUR OR LESS;  Surgeon: Halie Lang MD;  [facility]     HEAD & NECK SURGERY  2/15/2017    C5-C6     HERNIA REPAIR  2006    Umbilical hernia     HERNIORRHAPHY HIATAL  6/22/2012    Procedure: HERNIORRHAPHY HIATAL;;  Surgeon: Francis Vyas MD;  [facility]     HERNIORRHAPHY INGUINAL  11/22/2013    Procedure: HERNIORRHAPHY INGUINAL;;  Surgeon: Francis Vyas MD;  [facility]     INSERT PORT VASCULAR ACCESS Right 12/19/2017    Procedure: INSERT PORT VASCULAR ACCESS;  Right Chest Port Placement ;  Surgeon: Lisandro Alejandro PA-C;  [facility]     LAPAROTOMY EXPLORATORY  11/22/2013    Procedure: LAPAROTOMY EXPLORATORY;  Exploratory Laparotomy, Upper Endoscopy, Left Inguinal Hernia Repair;  Surgeon: Francis Vyas MD;  [facility]     PICC INSERTION Right 03/16/2017    5fr DL BioFlo PICC, 42cm (3cm external) in the R medial brachial vein w/ tip in the SVC RA junction.     PICC INSERTION Left 09/23/2017    5fr DL BioFlo PICC, 45cm (1cm external) in the L basilic vein w/ tip in the SVC RA junction.     SHAYLEE EN Y BOWEL  2003     SOFT TISSUE SURGERY       SOFT TISSUE SURGERY       TONSILLECTOMY         Current Outpatient Prescriptions:      oxyCODONE (ROXICODONE) 5 MG/5ML solution, Take 10-15 mLs (10-15 mg) by mouth every 4 hours as needed for moderate to severe pain Max of 55 mg per day. Fill on/after 12/15/17 not to start untill 12/17/17., Disp: 1650 mL, Rfl: 0     fentaNYL (DURAGESIC) 50 mcg/hr 72 hr patch, Place 1 patch onto the skin every 48 hours MYLAN BRAND ONLY. Fill on/after 12/15/17 to start on/after 12/17/17, Disp: 15 patch, Rfl:  "0     [START ON 1/5/2018] amphetamine-dextroamphetamine (ADDERALL) 20 MG per tablet, Take 1 tablet (20 mg) by mouth 2 times daily, Disp: 60 tablet, Rfl: 0     amphetamine-dextroamphetamine (ADDERALL) 20 MG per tablet, Take 1 tablet (20 mg) by mouth 2 times daily, Disp: 60 tablet, Rfl: 0     amphetamine-dextroamphetamine (ADDERALL) 20 MG per tablet, Take 1 tablet (20 mg) by mouth 2 times daily, Disp: 60 tablet, Rfl: 0     ondansetron (ZOFRAN-ODT) 8 MG ODT tab, Take 1 tablet (8 mg) by mouth every 8 hours as needed for nausea, Disp: 90 tablet, Rfl: 3     cyanocobalamin (VITAMIN B12) 1000 MCG/ML injection, Inject 1 mL (1,000 mcg) into the muscle every 30 days, Disp: 1 mL, Rfl: 11     albuterol (PROAIR HFA/PROVENTIL HFA/VENTOLIN HFA) 108 (90 BASE) MCG/ACT Inhaler, Inhale 2 puffs into the lungs every 4 hours as needed for shortness of breath / dyspnea or wheezing, Disp: 1 Inhaler, Rfl: 3     Carvedilol (COREG PO), Take 12.5 mg by mouth 2 times daily, Disp: , Rfl:      cyanocobalamin (VITAMIN B12) 1000 MCG/ML injection, Inject 1 mL into the muscle every 30 days, Disp: , Rfl:      Multiple Vitamin (MULTI-VITAMINS) TABS, Take 1 tablet by mouth, Disp: , Rfl:      nystatin-triamcinolone (MYCOLOG II) cream, Apply topically 2 times daily as needed, Disp: 60 g, Rfl: 5     mupirocin (BACTROBAN) 2 % ointment, Apply topically 3 times daily, Disp: 30 g, Rfl: 3     Needle, Disp, (BD DISP NEEDLES) 27G X 1/2\" MISC, 1 Device every 30 days Use for cyanocobalamin injection once q 30 days., Disp: 3 each, Rfl: 4     sucralfate (CARAFATE) 1 GM/10ML suspension, Take 10 mLs (1 g) by mouth 4 times daily, Disp: 1200 mL, Rfl: 11     lidocaine-prilocaine (EMLA) cream, Apply topically as needed for moderate pain, Disp: 30 g, Rfl: 11     vitamin D (ERGOCALCIFEROL) 86045 UNIT capsule, Take 1 capsule (50,000 Units) by mouth every 7 days, Disp: 12 capsule, Rfl: 3     naloxone (NARCAN) nasal spray, Spray 1 spray (4 mg) into one nostril alternating " "nostrils as needed for opioid reversal (every 2-3 minutes until assistance arrives.), Disp: 0.2 mL, Rfl: 0     morphine 0.1% in intrasite topical gel, Apply as needed prior to accessing the port site., Disp: 100 g, Rfl: 0     order for DME, Equipment being ordered: Bilateral knee high chronic venous insufficiency stockings--  mild-moderate pressures., Disp: 4 each, Rfl: 5     order for DME, Injection Supplies for Vitamin B12: 3cc syringes w/ 27 gauge needles, 1/2 inch length, Disp: 12 each, Rfl: 0    Allergies   Allergen Reactions     Bactrim [Sulfamethoxazole W/Trimethoprim] Rash     Penicillins Anaphylaxis     Doxycycline Rash     Vancomycin Rash     Rash after receiving vancomycin 3/28/16 (red man's?). Tolerated with slower infusion and diphenhydramine premed.       Review of systems:  General: negative for fever, chills  Resp: No shortness of breath or cough  CV: negative for chest pain  GI: negative for nausea, vomiting, abdominal pain, constipation and diarrhea  : negative  Musculoskeletal: negative  Neurologic: negative  Endocrine: negative    Exam:  /71  Pulse 60  Temp 97.6  F (36.4  C) (Temporal)  Resp 14  Ht 1.803 m (5' 11\")  Wt 89.6 kg (197 lb 9.6 oz)  SpO2 99%  BMI 27.56 kg/m2  Mallampati: Grade 2:  Soft palate, base of uvula, tonsillar pillars, and portion of posterior pharyngeal wall visible  Lungs: Lungs Clear with good breath sounds bilaterally  Heart: Normal heart sounds and rate    Jhonatan Alejandro PA-C  Interventional Radiology  [phone]      "

## 2017-12-26 NOTE — TELEPHONE ENCOUNTER
Stoney message from patient on Saturday 12/23 at 1820:      Hey I just wanted to let you that on Dec 19th 2017 I had a port put back in and I took an extra 20ml of oxycodone for the pain if you have any questions feel free to call us at [phone] this is also our new home phone number I just wanted to let you know thanks again and Catherine Waddell and Happy New year   ------------  Message sent to pt:    Esther Canales,     Thank you for letting us know about the additional oxycodone you took. I will be sure to have Dr. Milligan review this message.     Take care,     Gabbi Heath, YADIRAN, RN-BC  Patient Care Supervisor/Care Coordinator  [facility]

## 2017-12-26 NOTE — LETTER
[facility]  [address], [address]  [place] [address]  [phone]      January 3, 2018    Parker Acevedo                                                                                                                      [address] [place] [id number]        Dear Parker,    We have attempted to contact you by telephone without success.  We have received two requests from [employer] for LTD paperwork to be completed by your Provider.  In order to complete this, we will need you to schedule an appointment for an office visit with your Provider.  You can call us at [phone] to schedule this appointment.      Sincerely,   [facility] Support Staff / edith

## 2017-12-26 NOTE — TELEPHONE ENCOUNTER
Reason for call:  Form  Reason for Call:  Form, our goal is to have forms completed with 72 hours, however, some forms may require a visit or additional information.    Type of letter, form or note:  medical    Who is the form from?:  [employer] forms    Where did the form come from: form was faxed in    What clinic location was the form placed at?: [facility] - [phone]    Where the form was placed: 's Box    What number is listed as a contact on the form?:  [phone]       Additional comments:  Fax to [phone]    created by Balbina Lindajohn

## 2017-12-27 ENCOUNTER — HOME INFUSION (PRE-WILLOW HOME INFUSION) (OUTPATIENT)
Dept: PHARMACY | Facility: CLINIC | Age: 47
End: 2017-12-27

## 2017-12-27 NOTE — TELEPHONE ENCOUNTER
Left detailed message informing patient.       Forms placed in TC green folder until appt is scheduled

## 2017-12-27 NOTE — TELEPHONE ENCOUNTER
Second request from 11/22/2017--needing a clinic visit to assist in completing this forms for LTD.    Needing to be reminded of the need to come in for a clinic visit to complete the disability forms.    Assist in scheduling as needed.    Esteban Daly MD

## 2017-12-28 ENCOUNTER — HOME INFUSION (PRE-WILLOW HOME INFUSION) (OUTPATIENT)
Dept: PHARMACY | Facility: CLINIC | Age: 47
End: 2017-12-28

## 2017-12-28 LAB
ALBUMIN SERPL-MCNC: 3.7 G/DL (ref 3.4–5)
ALP SERPL-CCNC: 69 U/L (ref 40–150)
ALT SERPL W P-5'-P-CCNC: 25 U/L (ref 0–70)
ANION GAP SERPL CALCULATED.3IONS-SCNC: 7 MMOL/L (ref 3–14)
AST SERPL W P-5'-P-CCNC: 13 U/L (ref 0–45)
BASOPHILS # BLD AUTO: 0 10E9/L (ref 0–0.2)
BASOPHILS NFR BLD AUTO: 0.3 %
BILIRUB DIRECT SERPL-MCNC: 0.1 MG/DL (ref 0–0.2)
BILIRUB SERPL-MCNC: 0.4 MG/DL (ref 0.2–1.3)
BUN SERPL-MCNC: 16 MG/DL (ref 7–30)
CALCIUM SERPL-MCNC: 8.2 MG/DL (ref 8.5–10.1)
CHLORIDE SERPL-SCNC: 108 MMOL/L (ref 94–109)
CO2 SERPL-SCNC: 28 MMOL/L (ref 20–32)
CREAT SERPL-MCNC: 0.65 MG/DL (ref 0.66–1.25)
DIFFERENTIAL METHOD BLD: ABNORMAL
EOSINOPHIL # BLD AUTO: 0.2 10E9/L (ref 0–0.7)
EOSINOPHIL NFR BLD AUTO: 2.9 %
ERYTHROCYTE [DISTWIDTH] IN BLOOD BY AUTOMATED COUNT: 14 % (ref 10–15)
GFR SERPL CREATININE-BSD FRML MDRD: >90 ML/MIN/1.7M2
GLUCOSE SERPL-MCNC: 95 MG/DL (ref 70–99)
HCT VFR BLD AUTO: 42.3 % (ref 40–53)
HGB BLD-MCNC: 13.6 G/DL (ref 13.3–17.7)
IMM GRANULOCYTES # BLD: 0 10E9/L (ref 0–0.4)
IMM GRANULOCYTES NFR BLD: 0.2 %
LYMPHOCYTES # BLD AUTO: 1.8 10E9/L (ref 0.8–5.3)
LYMPHOCYTES NFR BLD AUTO: 27 %
MAGNESIUM SERPL-MCNC: 2 MG/DL (ref 1.6–2.3)
MCH RBC QN AUTO: 31.1 PG (ref 26.5–33)
MCHC RBC AUTO-ENTMCNC: 32.2 G/DL (ref 31.5–36.5)
MCV RBC AUTO: 97 FL (ref 78–100)
MONOCYTES # BLD AUTO: 0.6 10E9/L (ref 0–1.3)
MONOCYTES NFR BLD AUTO: 9.2 %
NEUTROPHILS # BLD AUTO: 4 10E9/L (ref 1.6–8.3)
NEUTROPHILS NFR BLD AUTO: 60.4 %
PHOSPHATE SERPL-MCNC: 3.1 MG/DL (ref 2.5–4.5)
PLATELET # BLD AUTO: 141 10E9/L (ref 150–450)
POTASSIUM SERPL-SCNC: 4 MMOL/L (ref 3.4–5.3)
PROT SERPL-MCNC: 6.9 G/DL (ref 6.8–8.8)
RBC # BLD AUTO: 4.37 10E12/L (ref 4.4–5.9)
SODIUM SERPL-SCNC: 143 MMOL/L (ref 133–144)
TRIGL SERPL-MCNC: 78 MG/DL
WBC # BLD AUTO: 6.6 10E9/L (ref 4–11)

## 2017-12-28 PROCEDURE — 85025 COMPLETE CBC W/AUTO DIFF WBC: CPT | Performed by: SURGERY

## 2017-12-28 PROCEDURE — 83735 ASSAY OF MAGNESIUM: CPT | Performed by: SURGERY

## 2017-12-28 PROCEDURE — 84100 ASSAY OF PHOSPHORUS: CPT | Performed by: SURGERY

## 2017-12-28 PROCEDURE — 84134 ASSAY OF PREALBUMIN: CPT | Performed by: SURGERY

## 2017-12-28 PROCEDURE — 80053 COMPREHEN METABOLIC PANEL: CPT | Performed by: SURGERY

## 2017-12-28 PROCEDURE — 84478 ASSAY OF TRIGLYCERIDES: CPT | Performed by: SURGERY

## 2017-12-28 PROCEDURE — 82248 BILIRUBIN DIRECT: CPT | Performed by: SURGERY

## 2017-12-28 NOTE — PROGRESS NOTES
This is a recent snapshot of the patient's [facility] medical record.  For current drug dose and complete information and questions, call [phone] or In Basket pool, fv home infusion ([id number])  CSN Number:  [id number]

## 2017-12-28 NOTE — PROGRESS NOTES
This is a recent snapshot of the patient's [facility] medical record.  For current drug dose and complete information and questions, call [phone] or In Basket pool, fv home infusion ([id number])  CSN Number:  [id number]

## 2018-01-01 ENCOUNTER — HOME INFUSION (PRE-WILLOW HOME INFUSION) (OUTPATIENT)
Dept: PHARMACY | Facility: CLINIC | Age: 48
End: 2018-01-01

## 2018-01-02 ENCOUNTER — CARE COORDINATION (OUTPATIENT)
Dept: SURGERY | Facility: CLINIC | Age: 48
End: 2018-01-02

## 2018-01-02 ENCOUNTER — HOME INFUSION (PRE-WILLOW HOME INFUSION) (OUTPATIENT)
Dept: PHARMACY | Facility: CLINIC | Age: 48
End: 2018-01-02

## 2018-01-02 NOTE — PROGRESS NOTES
[facility] called to get verbal for patients wife to change port a cath dressing. Wife will not be doing anything with needle just doing dressing change.

## 2018-01-03 ENCOUNTER — HOME INFUSION (PRE-WILLOW HOME INFUSION) (OUTPATIENT)
Dept: PHARMACY | Facility: CLINIC | Age: 48
End: 2018-01-03

## 2018-01-03 ENCOUNTER — OFFICE VISIT (OUTPATIENT)
Dept: PALLIATIVE MEDICINE | Facility: CLINIC | Age: 48
End: 2018-01-03
Payer: COMMERCIAL

## 2018-01-03 VITALS — SYSTOLIC BLOOD PRESSURE: 141 MMHG | DIASTOLIC BLOOD PRESSURE: 95 MMHG | HEART RATE: 83 BPM

## 2018-01-03 DIAGNOSIS — G89.29 CHRONIC BILATERAL LOW BACK PAIN WITHOUT SCIATICA: ICD-10-CM

## 2018-01-03 DIAGNOSIS — R10.9 CHRONIC ABDOMINAL PAIN: Primary | ICD-10-CM

## 2018-01-03 DIAGNOSIS — G89.18 PAIN FOLLOWING SURGERY OR PROCEDURE: ICD-10-CM

## 2018-01-03 DIAGNOSIS — Z79.891 ENCOUNTER FOR LONG-TERM OPIATE ANALGESIC USE: ICD-10-CM

## 2018-01-03 DIAGNOSIS — M54.50 CHRONIC BILATERAL LOW BACK PAIN WITHOUT SCIATICA: ICD-10-CM

## 2018-01-03 DIAGNOSIS — M79.18 MYOFASCIAL PAIN: ICD-10-CM

## 2018-01-03 DIAGNOSIS — G89.29 CHRONIC ABDOMINAL PAIN: Primary | ICD-10-CM

## 2018-01-03 PROCEDURE — 99214 OFFICE O/P EST MOD 30 MIN: CPT | Performed by: PSYCHIATRY & NEUROLOGY

## 2018-01-03 RX ORDER — OXYCODONE HCL 5 MG/5 ML
10-15 SOLUTION, ORAL ORAL EVERY 4 HOURS PRN
Qty: 1350 ML | Refills: 0 | Status: SHIPPED | OUTPATIENT
Start: 2018-01-03 | End: 2018-02-02

## 2018-01-03 RX ORDER — FENTANYL 50 UG/1
1 PATCH TRANSDERMAL
Qty: 15 PATCH | Refills: 0 | Status: SHIPPED | OUTPATIENT
Start: 2018-01-03 | End: 2018-02-02

## 2018-01-03 RX ORDER — LIDOCAINE 50 MG/G
1-2 PATCH TOPICAL EVERY 24 HOURS
Qty: 60 PATCH | Refills: 6 | Status: SHIPPED | OUTPATIENT
Start: 2018-01-03 | End: 2020-07-28

## 2018-01-03 ASSESSMENT — PAIN SCALES - GENERAL: PAINLEVEL: MODERATE PAIN (4)

## 2018-01-03 NOTE — PROGRESS NOTES
This is a recent snapshot of the patient's [facility] medical record.  For current drug dose and complete information and questions, call [phone] or In Basket pool, fv home infusion ([id number])  CSN Number:  [id number]

## 2018-01-03 NOTE — NURSING NOTE
"Chief Complaint   Patient presents with     Pain       Initial BP (!) 141/95  Pulse 83 Estimated body mass index is 27.56 kg/(m^2) as calculated from the following:    Height as of 12/19/17: 1.803 m (5' 11\").    Weight as of 12/19/17: 89.6 kg (197 lb 9.6 oz).  Medication Reconciliation: argelia Calvert CMA (AAMA)      "

## 2018-01-03 NOTE — PROGRESS NOTES
This is a recent snapshot of the patient's [facility] medical record.  For current drug dose and complete information and questions, call [phone] or In Basket pool, fv home infusion ([id number])  CSN Number:  [id number]

## 2018-01-03 NOTE — PROGRESS NOTES
This is a recent snapshot of the patient's [facility] medical record.  For current drug dose and complete information and questions, call [phone] or In Basket pool, fv home infusion ([id number])  CSN Number:  [id number]

## 2018-01-03 NOTE — PATIENT INSTRUCTIONS
1. Medications refilled today  2. Decrease oxycodone to 45mg/day  3. Follow up in 3 months    ----------------------------------------------------------------  Nurse Triage line:  [phone]   Call this number with any questions or concerns. You may leave a detailed message anytime. Calls are typically returned Monday through Friday between 8 AM and 4:30 PM. We usually get back to you within 2 business days depending on the issue/request.       Medication refills:    For non-narcotic medications, call your pharmacy directly to request a refill. The pharmacy will contact the [facility] for authorization. Please allow 3-4 days for these refills to be processed.     For narcotic refills, call the nurse triage line or send a [employer] message. Please contact us 7-10 days before your refill is due. The message MUST include the name of the specific medication(s) requested and how you would like to receive the prescription(s). The options are as follows:    Pain Clinic staff can mail the prescription to your pharmacy. Please tell us the name of the pharmacy.    You may pick the prescription up at the Pain Clinic (tell us the location) or during a clinic visit with your pain provider    Pain Clinic staff can deliver the prescription to the [facility] pharmacy in the clinic building. Please tell us the location.      Scheduling number: [phone].  Call this number to schedule or change appointments.    We believe regular attendance is key to your success in our program.    Any time you are unable to keep your appointment we ask that you call us at least 24 hours in advance to let us know. This will allow us to offer the appointment time to another patient.

## 2018-01-03 NOTE — TELEPHONE ENCOUNTER
Per Provider, called one last time to inform pt of need for an OV - Left detailed message informing patient.     Mailed letter as well

## 2018-01-03 NOTE — PROGRESS NOTES
[facility]    Date of visit: 1/3/2018    Chief complaint:    Chief Complaint   Patient presents with     Pain       Interval history:  Parker Acevedo was last seen by me on 9/18/17    Recommendations/plan at the last visit included:  1. Patient has weaned benzo significantly, continue to wean  No changes to opioids at this time, but will further plan for decreases.  Discussed doses and risks with him and his wife, and CDC guidelines. Some if his oral meds may not be fully absorbed due to his medical issues.  3. He has naloxone prescription from a previous hospitalization.    4. Follow up 3 months    Since his last visit, Parker Acevedo reports:  -Things have been going pretty well  -Has been doing fine   -He had a port replacement on 12/19/17 - which went fine; its now a little higher  -His pain is mostly in his abdomen, left shoulder, and right chest wall at the site of his port  -Pain is throbbing, sharp, stabbing, tiring, and exhausting  -Just moved to a new house and got worn out by that  -Would like to wait till he gets settled in prior to cutting back dramatically on his pain medications    Pain scores:  Pain intensity  5-6/10    Current pain treatments:   Oxycodone 10-15 mg liquid by G tube QID hours, total of 55 mg daily (MEDD 82.5 mg)  Fentanyl 50mcg/hr patch q48 hours   Lidocaine patches  Naloxone- has from previous hospitalization.    Previous medication treatments included:   Valium 5 mg/day, 1 tab in AM and PM-stopped in 11/2017  Tylenol #3   Vicodin   Tramadol   Diazepam- for sleep/anxiety  Gabapentin- bad headaches, acid reflux  Oxycodone max of 45mg/day.      Side Effects: no side effects    Medications:  Current Outpatient Prescriptions   Medication Sig Dispense Refill     oxyCODONE (ROXICODONE) 5 MG/5ML solution Take 10-15 mLs (10-15 mg) by mouth every 4 hours as needed for moderate to severe pain Max of 55 mg per day. Fill on/after 12/15/17 not  "to start untill 12/17/17. 1650 mL 0     fentaNYL (DURAGESIC) 50 mcg/hr 72 hr patch Place 1 patch onto the skin every 48 hours MYLAN BRAND ONLY. Fill on/after 12/15/17 to start on/after 12/17/17 15 patch 0     Multiple Vitamin (MULTI-VITAMINS) TABS Take 1 tablet by mouth       [START ON 1/5/2018] amphetamine-dextroamphetamine (ADDERALL) 20 MG per tablet Take 1 tablet (20 mg) by mouth 2 times daily 60 tablet 0     amphetamine-dextroamphetamine (ADDERALL) 20 MG per tablet Take 1 tablet (20 mg) by mouth 2 times daily 60 tablet 0     amphetamine-dextroamphetamine (ADDERALL) 20 MG per tablet Take 1 tablet (20 mg) by mouth 2 times daily 60 tablet 0     nystatin-triamcinolone (MYCOLOG II) cream Apply topically 2 times daily as needed 60 g 5     mupirocin (BACTROBAN) 2 % ointment Apply topically 3 times daily 30 g 3     Needle, Disp, (BD DISP NEEDLES) 27G X 1/2\" MISC 1 Device every 30 days Use for cyanocobalamin injection once q 30 days. 3 each 4     sucralfate (CARAFATE) 1 GM/10ML suspension Take 10 mLs (1 g) by mouth 4 times daily 1200 mL 11     ondansetron (ZOFRAN-ODT) 8 MG ODT tab Take 1 tablet (8 mg) by mouth every 8 hours as needed for nausea 90 tablet 3     cyanocobalamin (VITAMIN B12) 1000 MCG/ML injection Inject 1 mL (1,000 mcg) into the muscle every 30 days 1 mL 11     lidocaine-prilocaine (EMLA) cream Apply topically as needed for moderate pain 30 g 11     albuterol (PROAIR HFA/PROVENTIL HFA/VENTOLIN HFA) 108 (90 BASE) MCG/ACT Inhaler Inhale 2 puffs into the lungs every 4 hours as needed for shortness of breath / dyspnea or wheezing 1 Inhaler 3     vitamin D (ERGOCALCIFEROL) 93481 UNIT capsule Take 1 capsule (50,000 Units) by mouth every 7 days 12 capsule 3     Carvedilol (COREG PO) Take 12.5 mg by mouth 2 times daily       cyanocobalamin (VITAMIN B12) 1000 MCG/ML injection Inject 1 mL into the muscle every 30 days       naloxone (NARCAN) nasal spray Spray 1 spray (4 mg) into one nostril alternating nostrils as " "needed for opioid reversal (every 2-3 minutes until assistance arrives.) (Patient not taking: Reported on 1/3/2018) 0.2 mL 0     morphine 0.1% in intrasite topical gel Apply as needed prior to accessing the port site. (Patient not taking: Reported on 1/3/2018) 100 g 0     [place] [facility] PATIENT Contact [facility] for patient specific medication information at [phone] on admission and discharge from the hospital.  Phones are answered 24 hours a day 7 days a week 365 days a year.    Providers - Choose \"CONTINUE HOME MED (no script)\" at discharge if patient treatment with home infusion will continue.       order for DME Equipment being ordered: Bilateral knee high chronic venous insufficiency stockings--  mild-moderate pressures. 4 each 5     [place] [facility] PATIENT Contact [facility] for patient specific medication information at [phone] on admission and discharge from the hospital.  Phones are answered 24 hours a day 7 days a week 365 days a year.    Providers - Choose \"CONTINUE HOME MED (no script)\" at discharge if patient treatment with home infusion will continue.       order for DME Injection Supplies for Vitamin B12: 3cc syringes w/ 27 gauge needles, 1/2 inch length 12 each 0     [DISCONTINUED] NO ACTIVE MEDICATIONS . 0 0       Medical History: any changes in medical history since they were last seen? Port replacement on 12/19/17    Review of Systems:  The 14 system ROS was reviewed from the intake questionnaire, and is positive for: headache, itching, abdominal pain, nausea, joint pain, stiffness, arthritis, back pain, neck pian, anxiety and depression  Any bowel or bladder problems: diarrhea, bladder normal  Mood: Baseline, with anxiety    Physical Exam:  Blood pressure (!) 141/95, pulse 83.  General: awake, alert  Gait: Slow  MSK exam: hunched posture  Port site without drainage - healing well       THE 4 A's OF OPIOID MAINTENANCE " ANALGESIA    Analgesia: good    Activity: on disability    Adverse effects: none    Adherence to Rx protocol: good- [place] Prescription Monitoring Program checked 1/3/2018 appropriate      Assessment:   1. Chronic abdominal pain, with history of gastric bypass and later peptic ulcer   2. G tube placement- only used for venting  3. Myofascial abdominal pain, with significant guarding and poor posture  4. Opioid tolerance  5. Anxiety  6. Foot pain with tendonous injury- healed  7. Left arm weakness, consistent with radial nerve injury- improving  8. Port placement- h/o recurrent infections      Plan:   1. He is completely off his benzodiazepines.  No changes to opioids at this time, but will further plan for decreases. He underwent a port replacement on 12/19/17 and just moved into a new house that requires work.  Discussed doses and risks with him and his wife, and CDC guidelines. Some of his oral meds may not be fully absorbed due to his medical issues.  3. He has naloxone prescription from a previous hospitalization.   4. UDS at next visit (last was in 4/2017)  5. Opioid agreement signed on 12/7/17   6. Will decrease oxycodone use to 45mg/day with next script. Duet 1/15 due to use after port placement  7.  Follow up 3 months    Rebecca Contreras MD     I saw and examined the patient with the Pain Fellow/Resident. I have reviewed and agree with the resident's note and plan of care and made changes and corrections directly to the body of the note.    TIME SPENT:  BY FELLOW/RESIDENT ALONE 15 MIN  BY MYSELF AND FELLOW/RESIDENT TOGETHER 0 MIN  BY MYSELF WITHOUT THE FELLOW/RESIDENT 25 MIN    These times included 15 minutes I spent counseling him about his diagnosis and treatment options and coordination of care with the primary team    Jessica Milligan MD  [facility]

## 2018-01-03 NOTE — MR AVS SNAPSHOT
After Visit Summary   1/3/2018    Parker Acevedo Sr    MRN: [id number]           Patient Information     Date Of Birth          1970        Visit Information        Provider Department      1/3/2018 2:00 PM Patti Milligan MD [facility]        Today's Diagnoses     Chronic bilateral low back pain without sciatica    -  1    Chronic abdominal pain        Encounter for long-term opiate analgesic use        Pain following surgery or procedure        Myofascial pain          Care Instructions    1. Medications refilled today  2. Will plan to wean at next visit  3. Follow up in 3 months    ----------------------------------------------------------------  Nurse Triage line:  [phone]   Call this number with any questions or concerns. You may leave a detailed message anytime. Calls are typically returned Monday through Friday between 8 AM and 4:30 PM. We usually get back to you within 2 business days depending on the issue/request.       Medication refills:    For non-narcotic medications, call your pharmacy directly to request a refill. The pharmacy will contact the [facility] for authorization. Please allow 3-4 days for these refills to be processed.     For narcotic refills, call the nurse triage line or send a [employer] message. Please contact us 7-10 days before your refill is due. The message MUST include the name of the specific medication(s) requested and how you would like to receive the prescription(s). The options are as follows:    Pain Clinic staff can mail the prescription to your pharmacy. Please tell us the name of the pharmacy.    You may pick the prescription up at the Pain Clinic (tell us the location) or during a clinic visit with your pain provider    Pain Clinic staff can deliver the prescription to the [place] pharmacy in the clinic building. Please tell us the location.      Scheduling number: [phone].  Call this number to schedule  or change appointments.    We believe regular attendance is key to your success in our program.    Any time you are unable to keep your appointment we ask that you call us at least 24 hours in advance to let us know. This will allow us to offer the appointment time to another patient.               Follow-ups after your visit        Who to contact     If you have questions or need follow up information about today's clinic visit or your schedule please contact [facility] directly at [phone].  Normal or non-critical lab and imaging results will be communicated to you by Rheonixhart, letter or phone within 4 business days after the clinic has received the results. If you do not hear from us within 7 days, please contact the clinic through Minglebo[employer] or phone. If you have a critical or abnormal lab result, we will notify you by phone as soon as possible.  Submit refill requests through DWNLD or call your pharmacy and they will forward the refill request to us. Please allow 3 business days for your refill to be completed.          Additional Information About Your Visit        DWNLD Information     [employer] gives you secure access to your electronic health record. If you see a primary care provider, you can also send messages to your care team and make appointments. If you have questions, please call your primary care clinic.  If you do not have a primary care provider, please call [phone] and they will assist you.        Care EveryWhere ID     This is your Care EveryWhere ID. This could be used by other organizations to access your [place] medical records  [id number]        Your Vitals Were     Pulse                   83            Blood Pressure from Last 3 Encounters:   01/03/18 (!) 141/95   12/19/17 119/71   11/06/17 110/64    Weight from Last 3 Encounters:   12/19/17 89.6 kg (197 lb 9.6 oz)   11/06/17 79.9 kg (176 lb 1.6 oz)   09/21/17 92.5 kg (204 lb)              Today, you had the following      No orders found for display         Today's Medication Changes          These changes are accurate as of: 1/3/18  2:32 PM.  If you have any questions, ask your nurse or doctor.               Start taking these medicines.        Dose/Directions    lidocaine 5 % Patch   Commonly known as:  LIDODERM   Used for:  Chronic bilateral low back pain without sciatica, Chronic abdominal pain, Myofascial pain        Dose:  1-2 patch   Place 1-2 patches onto the skin every 24 hours Wear for 12 hours, remove for 12 hours.  OK to cut to better fit to size.   Quantity:  60 patch   Refills:  6         These medicines have changed or have updated prescriptions.        Dose/Directions    fentaNYL 50 mcg/hr 72 hr patch   Commonly known as:  DURAGESIC   This may have changed:  additional instructions   Used for:  Chronic abdominal pain        Dose:  1 patch   Place 1 patch onto the skin every 48 hours MYLAN BRAND ONLY. Fill on/after 1/12/18 to start on/after 1/15/18   Quantity:  15 patch   Refills:  0       oxyCODONE 5 MG/5ML solution   Commonly known as:  ROXICODONE   This may have changed:  additional instructions   Used for:  Encounter for long-term opiate analgesic use        Dose:  10-15 mg   Take 10-15 mLs (10-15 mg) by mouth every 4 hours as needed for moderate to severe pain Max of 45 mg per day. Fill on/after 1/12/18 not to start untill 1/15/18.   Quantity:  1350 mL   Refills:  0            Where to get your medicines      These medications were sent to [facility] [address]  [address] [facility] [address]     Phone:  [phone]     lidocaine 5 % Patch         Some of these will need a paper prescription and others can be bought over the counter.  Ask your nurse if you have questions.     Bring a paper prescription for each of these medications     fentaNYL 50 mcg/hr 72 hr patch    morphine 0.1% in intrasite topical gel    oxyCODONE 5 MG/5ML solution                Primary Care  Provider Office Phone # Fax #    Esteban Daly [phone] [address] [facility] [address]        Equal Access to Services     SEBASWENDY MEGAN : Hadricki kameron huff amy Soheike, waaxda luqadaha, qaybta kaalmada dayami, carmela calerodisha belkys. So [facility] [phone].    ATENCIÓN: Si habla español, tiene a hicks disposición servicios gratuitos de asistencia lingüística. Llame al [phone].    We comply with applicable federal civil rights laws and [place] laws. We do not discriminate on the basis of race, color, national origin, age, disability, sex, sexual orientation, or gender identity.            Thank you!     Thank you for choosing [facility]  for your care. Our goal is always to provide you with excellent care. Hearing back from our patients is one way we can continue to improve our services. Please take a few minutes to complete the written survey that you may receive in the mail after your visit with us. Thank you!             Your Updated Medication List - Protect others around you: Learn how to safely use, store and throw away your medicines at www.disposemymeds.org.          This list is accurate as of: 1/3/18  2:32 PM.  Always use your most recent med list.                   Brand Name Dispense Instructions for use Diagnosis    albuterol 108 (90 BASE) MCG/ACT Inhaler    PROAIR HFA/PROVENTIL HFA/VENTOLIN HFA    1 Inhaler    Inhale 2 puffs into the lungs every 4 hours as needed for shortness of breath / dyspnea or wheezing    Aspiration pneumonitis (H)       * amphetamine-dextroamphetamine 20 MG per tablet    ADDERALL    60 tablet    Take 1 tablet (20 mg) by mouth 2 times daily    ADHD (attention deficit hyperactivity disorder), inattentive type       * amphetamine-dextroamphetamine 20 MG per tablet    ADDERALL    60 tablet    Take 1 tablet (20 mg) by mouth 2 times daily    ADHD (attention deficit hyperactivity disorder), inattentive type     "   * amphetamine-dextroamphetamine 20 MG per tablet   Start taking on:  1/5/2018    ADDERALL    60 tablet    Take 1 tablet (20 mg) by mouth 2 times daily    ADHD (attention deficit hyperactivity disorder), inattentive type       COREG PO      Take 12.5 mg by mouth 2 times daily        * cyanocobalamin 1000 MCG/ML injection    VITAMIN B12     Inject 1 mL into the muscle every 30 days        * cyanocobalamin 1000 MCG/ML injection    VITAMIN B12    1 mL    Inject 1 mL (1,000 mcg) into the muscle every 30 days    Bariatric surgery status       * [facility] [place] [facility] PATIENT      Contact [facility] for patient specific medication information at [phone] on admission and discharge from the hospital.  Phones are answered 24 hours a day 7 days a week 365 days a year.  Providers - Choose \"CONTINUE HOME MED (no script)\" at discharge if patient treatment with home infusion will continue.    S/P bariatric surgery       * [place] [facility]ED PATIENT      Contact [facility] for patient specific medication information at [phone] on admission and discharge from the hospital.  Phones are answered 24 hours a day 7 days a week 365 days a year.  Providers - Choose \"CONTINUE HOME MED (no script)\" at discharge if patient treatment with home infusion will continue.    Severe malnutrition (H)       fentaNYL 50 mcg/hr 72 hr patch    DURAGESIC    15 patch    Place 1 patch onto the skin every 48 hours MYLAN BRAND ONLY. Fill on/after 1/12/18 to start on/after 1/15/18    Chronic abdominal pain       lidocaine 5 % Patch    LIDODERM    60 patch    Place 1-2 patches onto the skin every 24 hours Wear for 12 hours, remove for 12 hours.  OK to cut to better fit to size.    Chronic bilateral low back pain without sciatica, Chronic abdominal pain, Myofascial pain       lidocaine-prilocaine cream    EMLA    30 g    Apply topically as needed for moderate pain    Pain following surgery or procedure " "      morphine 0.1% in intrasite topical gel     100 g    Apply as needed prior to accessing the port site.    Pain following surgery or procedure       MULTI-VITAMINS Tabs      Take 1 tablet by mouth        mupirocin 2 % ointment    BACTROBAN    30 g    Apply topically 3 times daily    Skin infection       naloxone nasal spray    NARCAN    0.2 mL    Spray 1 spray (4 mg) into one nostril alternating nostrils as needed for opioid reversal (every 2-3 minutes until assistance arrives.)    Encounter for long-term opiate analgesic use, Chronic abdominal pain       Needle (Disp) 27G X 1/2\" Misc    BD DISP NEEDLES    3 each    1 Device every 30 days Use for cyanocobalamin injection once q 30 days.    Bariatric surgery status       nystatin-triamcinolone cream    MYCOLOG II    60 g    Apply topically 2 times daily as needed    Skin irritation       ondansetron 8 MG ODT tab    ZOFRAN-ODT    90 tablet    Take 1 tablet (8 mg) by mouth every 8 hours as needed for nausea    Nausea       * order for DME     12 each    Injection Supplies for Vitamin B12: 3cc syringes w/ 27 gauge needles, 1/2 inch length    Bariatric surgery status       * order for DME     4 each    Equipment being ordered: Bilateral knee high chronic venous insufficiency stockings--  mild-moderate pressures.    Bilateral edema of lower extremity       oxyCODONE 5 MG/5ML solution    ROXICODONE    1350 mL    Take 10-15 mLs (10-15 mg) by mouth every 4 hours as needed for moderate to severe pain Max of 45 mg per day. Fill on/after 1/12/18 not to start untill 1/15/18.    Encounter for long-term opiate analgesic use       sucralfate 1 GM/10ML suspension    CARAFATE    1200 mL    Take 10 mLs (1 g) by mouth 4 times daily    Nausea       vitamin D 65439 UNIT capsule    ERGOCALCIFEROL    12 capsule    Take 1 capsule (50,000 Units) by mouth every 7 days    Vitamin D deficiency       * Notice:  This list has 9 medication(s) that are the same as other medications prescribed " for you. Read the directions carefully, and ask your doctor or other care provider to review them with you.

## 2018-01-04 ENCOUNTER — MYC MEDICAL ADVICE (OUTPATIENT)
Dept: FAMILY MEDICINE | Facility: CLINIC | Age: 48
End: 2018-01-04

## 2018-01-04 NOTE — PROGRESS NOTES
This is a recent snapshot of the patient's [facility] medical record.  For current drug dose and complete information and questions, call [phone] or In Basket pool, [facility] home infusion ([id number])  CSN Number:  [id number]

## 2018-01-04 NOTE — PROGRESS NOTES
This is a recent snapshot of the patient's [facility] medical record.  For current drug dose and complete information and questions, call [phone] or In Basket pool, [facility] home infusion ([id number])  CSN Number:  [id number]

## 2018-01-11 ENCOUNTER — HOSPITAL ENCOUNTER (EMERGENCY)
Facility: CLINIC | Age: 48
Discharge: HOME | End: 2018-01-12
Attending: EMERGENCY MEDICINE | Admitting: EMERGENCY MEDICINE
Payer: COMMERCIAL

## 2018-01-11 ENCOUNTER — CARE COORDINATION (OUTPATIENT)
Dept: CARE COORDINATION | Facility: CLINIC | Age: 48
End: 2018-01-11

## 2018-01-11 DIAGNOSIS — M54.9 ACUTE BILATERAL BACK PAIN, UNSPECIFIED BACK LOCATION: ICD-10-CM

## 2018-01-11 PROCEDURE — 99285 EMERGENCY DEPT VISIT HI MDM: CPT | Mod: Z6 | Performed by: EMERGENCY MEDICINE

## 2018-01-11 PROCEDURE — 99285 EMERGENCY DEPT VISIT HI MDM: CPT | Mod: 25 | Performed by: EMERGENCY MEDICINE

## 2018-01-11 NOTE — ED AVS SNAPSHOT
[facility], [place], Emergency Department    [address] [place] [id number]    Phone:  [phone]                                       Parker Acevedo Sr   MRN: [id number]    Department:  [facility], Emergency Department   Date of Visit:  1/11/2018           After Visit Summary Signature Page     I have received my discharge instructions, and my questions have been answered. I have discussed any challenges I see with this plan with the nurse or doctor.    ..........................................................................................................................................  Patient/Patient Representative Signature      ..........................................................................................................................................  Patient Representative Print Name and Relationship to Patient    ..................................................               ................................................  Date                                            Time    ..........................................................................................................................................  Reviewed by Signature/Title    ...................................................              ..............................................  Date                                                            Time

## 2018-01-11 NOTE — TELEPHONE ENCOUNTER
Per DA, Left detailed message informing patient of the need to schedule an OV to complete the forms    Mailed a blank copy of the forms to pt to bring in to the OV when it is scheduled, so we are not liable for holding on to the forms any longer than necessary.    Will keep a copy in the TC green folder until Feb1, 2018 - then I will shred the forms.

## 2018-01-11 NOTE — ED AVS SNAPSHOT
[facility], Emergency Department    [address] [address]    Phone:  [phone]                                       Parker Acevedo Sr   MRN: [id number]    Department:  [facility], Emergency Department   Date of Visit:  1/11/2018           Patient Information     Date Of Birth          1970        Your diagnoses for this visit were:     None       You were seen by Rosas Maya MD.        Discharge Instructions       We collected blood cultures and will contact you if they are positive.  Please return if you feel more ill.         Future Appointments        Provider Department Dept Phone Center    4/4/2018 1:30 PM Patti Milligan MD [facility] [place] [phone] [facility]      24 Hour Appointment Hotline       To make an appointment at any [facility], call [phone]). If you don't have a family doctor or clinic, we will help you find one. [facility] are conveniently located to serve the needs of you and your family.             Review of your medicines      START taking        Dose / Directions Last dose taken    potassium bicarbonate 25 MEQ solu-tab   Commonly known as:  K-LYTE   Dose:  25 mEq   Quantity:  4 tablet        Take 1 tablet (25 mEq) by mouth 2 times daily for 2 days   Refills:  0          Our records show that you are taking the medicines listed below. If these are incorrect, please call your family doctor or clinic.        Dose / Directions Last dose taken    albuterol 108 (90 BASE) MCG/ACT Inhaler   Commonly known as:  PROAIR HFA/PROVENTIL HFA/VENTOLIN HFA   Dose:  2 puff   Quantity:  1 Inhaler        Inhale 2 puffs into the lungs every 4 hours as needed for shortness of breath / dyspnea or wheezing   Refills:  3        * amphetamine-dextroamphetamine 20 MG per tablet   Commonly known as:  ADDERALL   Dose:  20 mg   Quantity:  60 tablet        Take 1 tablet (20 mg) by mouth 2 times daily   Refills:  0      "   * amphetamine-dextroamphetamine 20 MG per tablet   Commonly known as:  ADDERALL   Dose:  20 mg   Quantity:  60 tablet        Take 1 tablet (20 mg) by mouth 2 times daily   Refills:  0        * amphetamine-dextroamphetamine 20 MG per tablet   Commonly known as:  ADDERALL   Dose:  20 mg   Quantity:  60 tablet        Take 1 tablet (20 mg) by mouth 2 times daily   Refills:  0        COREG PO   Dose:  12.5 mg        Take 12.5 mg by mouth 2 times daily   Refills:  0        * cyanocobalamin 1000 MCG/ML injection   Commonly known as:  VITAMIN B12   Dose:  1 mL        Inject 1 mL into the muscle every 30 days   Refills:  0        * cyanocobalamin 1000 MCG/ML injection   Commonly known as:  VITAMIN B12   Dose:  1 mL   Quantity:  1 mL        Inject 1 mL (1,000 mcg) into the muscle every 30 days   Refills:  11        * [facility]IENT        Contact [facility] for patient specific medication information at [phone] on admission and discharge from the hospital.  Phones are answered 24 hours a day 7 days a week 365 days a year.  Providers - Choose \"CONTINUE HOME MED (no script)\" at discharge if patient treatment with home infusion will continue.   Refills:  0        * [facility] PATIENT        Contact [facility] for patient specific medication information at [phone] on admission and discharge from the hospital.  Phones are answered 24 hours a day 7 days a week 365 days a year.  Providers - Choose \"CONTINUE HOME MED (no script)\" at discharge if patient treatment with home infusion will continue.   Refills:  0        fentaNYL 50 mcg/hr 72 hr patch   Commonly known as:  DURAGESIC   Dose:  1 patch   Quantity:  15 patch        Place 1 patch onto the skin every 48 hours MYLAN BRAND ONLY. Fill on/after 1/12/18 to start on/after 1/15/18   Refills:  0        lidocaine 5 % Patch   Commonly known as:  LIDODERM   Dose:  1-2 patch   Quantity:  60 patch        " "Place 1-2 patches onto the skin every 24 hours Wear for 12 hours, remove for 12 hours.  OK to cut to better fit to size.   Refills:  6        lidocaine-prilocaine cream   Commonly known as:  EMLA   Quantity:  30 g        Apply topically as needed for moderate pain   Refills:  11        morphine 0.1% in intrasite topical gel   Quantity:  100 g        Apply as needed prior to accessing the port site.   Refills:  0        MULTI-VITAMINS Tabs   Dose:  1 tablet        Take 1 tablet by mouth   Refills:  0        mupirocin 2 % ointment   Commonly known as:  BACTROBAN   Quantity:  30 g        Apply topically 3 times daily   Refills:  3        naloxone nasal spray   Commonly known as:  NARCAN   Dose:  4 mg   Quantity:  0.2 mL        Spray 1 spray (4 mg) into one nostril alternating nostrils as needed for opioid reversal (every 2-3 minutes until assistance arrives.)   Refills:  0        Needle (Disp) 27G X 1/2\" Misc   Commonly known as:  BD DISP NEEDLES   Dose:  1 Device   Quantity:  3 each        1 Device every 30 days Use for cyanocobalamin injection once q 30 days.   Refills:  4        nystatin-triamcinolone cream   Commonly known as:  MYCOLOG II   Quantity:  60 g        Apply topically 2 times daily as needed   Refills:  5        ondansetron 8 MG ODT tab   Commonly known as:  ZOFRAN-ODT   Dose:  8 mg   Quantity:  90 tablet        Take 1 tablet (8 mg) by mouth every 8 hours as needed for nausea   Refills:  3        * order for DME   Quantity:  12 each        Injection Supplies for Vitamin B12: 3cc syringes w/ 27 gauge needles, 1/2 inch length   Refills:  0        * order for DME   Quantity:  4 each        Equipment being ordered: Bilateral knee high chronic venous insufficiency stockings--  mild-moderate pressures.   Refills:  5        oxyCODONE 5 MG/5ML solution   Commonly known as:  ROXICODONE   Dose:  10-15 mg   Quantity:  1350 mL        Take 10-15 mLs (10-15 mg) by mouth every 4 hours as needed for moderate to severe " pain Max of 45 mg per day. Fill on/after 1/12/18 not to start untill 1/15/18.   Refills:  0        sucralfate 1 GM/10ML suspension   Commonly known as:  CARAFATE   Dose:  1 g   Quantity:  1200 mL        Take 10 mLs (1 g) by mouth 4 times daily   Refills:  11        vitamin D 33743 UNIT capsule   Commonly known as:  ERGOCALCIFEROL   Dose:  63846 Units   Quantity:  12 capsule        Take 1 capsule (50,000 Units) by mouth every 7 days   Refills:  3        * Notice:  This list has 9 medication(s) that are the same as other medications prescribed for you. Read the directions carefully, and ask your doctor or other care provider to review them with you.            Prescriptions were sent or printed at these locations (1 Prescription)                   Other Prescriptions                Printed at Department/Unit printer (1 of 1)         potassium bicarbonate (K-LYTE) 25 MEQ solu-tab                Procedures and tests performed during your visit     Procedure/Test Number of Times Performed    Blood culture 3    CBC with platelets differential 1    Chest XR,  PA & LAT 1    Comprehensive metabolic panel 1    Lactic acid 1    UA reflex to Microscopic and Culture 1      Orders Needing Specimen Collection     None      Pending Results     Date and Time Order Name Status Description    1/12/2018 0237 Chest XR,  PA & LAT Preliminary     1/12/2018 0012 Blood culture In process     1/12/2018 0005 Blood culture In process             Pending Culture Results     Date and Time Order Name Status Description    1/12/2018 0012 Blood culture In process     1/12/2018 0005 Blood culture In process             Pending Results Instructions     If you had any lab results that were not finalized at the time of your Discharge, you can call the ED Lab Result RN at [phone]. You will be contacted by this team for any positive Lab results or changes in treatment. The nurses are available 7 days a week from 10A to 6:30P.  You can leave a  message 24 hours per day and they will return your call.        Thank you for choosing [facility]       Thank you for choosing [facility] for your care. Our goal is always to provide you with excellent care. Hearing back from our patients is one way we can continue to improve our services. Please take a few minutes to complete the written survey that you may receive in the mail after you visit with us. Thank you!        [employer] Information     [employer] gives you secure access to your electronic health record. If you see a primary care provider, you can also send messages to your care team and make appointments. If you have questions, please call your primary care clinic.  If you do not have a primary care provider, please call [phone] and they will assist you.        Care EveryWhere ID     This is your Care EveryWhere ID. This could be used by other organizations to access your [facility] medical records  [id number]        Equal Access to Services     KATHRYN GUZMAN : Negra Nathan, haydee cr, carmela pearl. So [facility] [phone].    ATENCIÓN: Si habla español, tiene a hicks disposición servicios gratuitos de asistencia lingüística. Chu al [phone].    We comply with applicable federal civil rights laws and [place] laws. We do not discriminate on the basis of race, color, national origin, age, disability, sex, sexual orientation, or gender identity.            After Visit Summary       This is your record. Keep this with you and show to your community pharmacist(s) and doctor(s) at your next visit.

## 2018-01-11 NOTE — PROGRESS NOTES
Clinic Care Coordination Contact  [facility]/Voicemail    Referral Source: IP/TCU Report  Clinical Data: Care Coordinator Outreach  Outreach attempted x 1.  Left message on voicemail with call back information and requested return call.  Plan: Care Coordinator mailed out care coordination introduction letter on 4/4/16. Care Coordinator will try to reach patient again in 5-10 business days.    Melissa Behl BSN, RN, N  [facility] Care Coordinator  [phone]

## 2018-01-12 ENCOUNTER — CARE COORDINATION (OUTPATIENT)
Dept: CARE COORDINATION | Facility: CLINIC | Age: 48
End: 2018-01-12

## 2018-01-12 ENCOUNTER — HOSPITAL ENCOUNTER (INPATIENT)
Facility: CLINIC | Age: 48
LOS: 4 days | Discharge: HOME | DRG: 314 | End: 2018-01-17
Attending: EMERGENCY MEDICINE | Admitting: INTERNAL MEDICINE
Payer: COMMERCIAL

## 2018-01-12 ENCOUNTER — HOME INFUSION (PRE-WILLOW HOME INFUSION) (OUTPATIENT)
Dept: PHARMACY | Facility: CLINIC | Age: 48
End: 2018-01-12

## 2018-01-12 ENCOUNTER — APPOINTMENT (OUTPATIENT)
Dept: GENERAL RADIOLOGY | Facility: CLINIC | Age: 48
End: 2018-01-12
Attending: EMERGENCY MEDICINE
Payer: COMMERCIAL

## 2018-01-12 ENCOUNTER — MYC MEDICAL ADVICE (OUTPATIENT)
Dept: FAMILY MEDICINE | Facility: CLINIC | Age: 48
End: 2018-01-12

## 2018-01-12 VITALS
TEMPERATURE: 98.6 F | HEIGHT: 71 IN | DIASTOLIC BLOOD PRESSURE: 65 MMHG | WEIGHT: 186 LBS | HEART RATE: 83 BPM | SYSTOLIC BLOOD PRESSURE: 106 MMHG | RESPIRATION RATE: 16 BRPM | OXYGEN SATURATION: 98 % | BODY MASS INDEX: 26.04 KG/M2

## 2018-01-12 DIAGNOSIS — R21 RASH: ICD-10-CM

## 2018-01-12 DIAGNOSIS — Z98.84 GASTRIC BYPASS STATUS FOR OBESITY: Primary | ICD-10-CM

## 2018-01-12 DIAGNOSIS — T80.219A INFECTION OF VENOUS ACCESS PORT, INITIAL ENCOUNTER: ICD-10-CM

## 2018-01-12 DIAGNOSIS — E46 MALNUTRITION, UNSPECIFIED TYPE (H): ICD-10-CM

## 2018-01-12 DIAGNOSIS — L29.9 ITCHING: ICD-10-CM

## 2018-01-12 LAB
ALBUMIN SERPL-MCNC: 3.1 G/DL (ref 3.4–5)
ALBUMIN UR-MCNC: 10 MG/DL
ALP SERPL-CCNC: 112 U/L (ref 40–150)
ALT SERPL W P-5'-P-CCNC: 50 U/L (ref 0–70)
ANION GAP SERPL CALCULATED.3IONS-SCNC: 9 MMOL/L (ref 3–14)
APPEARANCE UR: CLEAR
AST SERPL W P-5'-P-CCNC: 27 U/L (ref 0–45)
BASOPHILS # BLD AUTO: 0 10E9/L (ref 0–0.2)
BASOPHILS # BLD AUTO: 0 10E9/L (ref 0–0.2)
BASOPHILS NFR BLD AUTO: 0.2 %
BASOPHILS NFR BLD AUTO: 0.3 %
BILIRUB SERPL-MCNC: 0.7 MG/DL (ref 0.2–1.3)
BILIRUB UR QL STRIP: NEGATIVE
BUN SERPL-MCNC: 11 MG/DL (ref 7–30)
CALCIUM SERPL-MCNC: 7.5 MG/DL (ref 8.5–10.1)
CHLORIDE SERPL-SCNC: 110 MMOL/L (ref 94–109)
CO2 SERPL-SCNC: 22 MMOL/L (ref 20–32)
COLOR UR AUTO: YELLOW
CREAT SERPL-MCNC: 0.77 MG/DL (ref 0.66–1.25)
DIFFERENTIAL METHOD BLD: ABNORMAL
DIFFERENTIAL METHOD BLD: ABNORMAL
EOSINOPHIL # BLD AUTO: 0.1 10E9/L (ref 0–0.7)
EOSINOPHIL # BLD AUTO: 0.4 10E9/L (ref 0–0.7)
EOSINOPHIL NFR BLD AUTO: 2 %
EOSINOPHIL NFR BLD AUTO: 3.4 %
ERYTHROCYTE [DISTWIDTH] IN BLOOD BY AUTOMATED COUNT: 14.1 % (ref 10–15)
ERYTHROCYTE [DISTWIDTH] IN BLOOD BY AUTOMATED COUNT: 14.4 % (ref 10–15)
GFR SERPL CREATININE-BSD FRML MDRD: >90 ML/MIN/1.7M2
GLUCOSE SERPL-MCNC: 94 MG/DL (ref 70–99)
GLUCOSE UR STRIP-MCNC: NEGATIVE MG/DL
HCT VFR BLD AUTO: 37.9 % (ref 40–53)
HCT VFR BLD AUTO: 38.6 % (ref 40–53)
HGB BLD-MCNC: 12.3 G/DL (ref 13.3–17.7)
HGB BLD-MCNC: 12.8 G/DL (ref 13.3–17.7)
HGB UR QL STRIP: ABNORMAL
IMM GRANULOCYTES # BLD: 0 10E9/L (ref 0–0.4)
IMM GRANULOCYTES # BLD: 0 10E9/L (ref 0–0.4)
IMM GRANULOCYTES NFR BLD: 0.3 %
IMM GRANULOCYTES NFR BLD: 1.1 %
KETONES UR STRIP-MCNC: 5 MG/DL
LACTATE BLD-SCNC: 1.2 MMOL/L (ref 0.7–2)
LEUKOCYTE ESTERASE UR QL STRIP: NEGATIVE
LYMPHOCYTES # BLD AUTO: 0.2 10E9/L (ref 0.8–5.3)
LYMPHOCYTES # BLD AUTO: 2.2 10E9/L (ref 0.8–5.3)
LYMPHOCYTES NFR BLD AUTO: 17.8 %
LYMPHOCYTES NFR BLD AUTO: 5 %
MCH RBC QN AUTO: 30.8 PG (ref 26.5–33)
MCH RBC QN AUTO: 31.6 PG (ref 26.5–33)
MCHC RBC AUTO-ENTMCNC: 32.5 G/DL (ref 31.5–36.5)
MCHC RBC AUTO-ENTMCNC: 33.2 G/DL (ref 31.5–36.5)
MCV RBC AUTO: 95 FL (ref 78–100)
MCV RBC AUTO: 95 FL (ref 78–100)
MONOCYTES # BLD AUTO: 0.1 10E9/L (ref 0–1.3)
MONOCYTES # BLD AUTO: 1.2 10E9/L (ref 0–1.3)
MONOCYTES NFR BLD AUTO: 1.4 %
MONOCYTES NFR BLD AUTO: 9.5 %
MUCOUS THREADS #/AREA URNS LPF: PRESENT /LPF
NEUTROPHILS # BLD AUTO: 3.2 10E9/L (ref 1.6–8.3)
NEUTROPHILS # BLD AUTO: 8.5 10E9/L (ref 1.6–8.3)
NEUTROPHILS NFR BLD AUTO: 68.8 %
NEUTROPHILS NFR BLD AUTO: 90.2 %
NITRATE UR QL: NEGATIVE
NRBC # BLD AUTO: 0 10*3/UL
NRBC # BLD AUTO: 0 10*3/UL
NRBC BLD AUTO-RTO: 0 /100
NRBC BLD AUTO-RTO: 0 /100
PH UR STRIP: 6.5 PH (ref 5–7)
PLATELET # BLD AUTO: 70 10E9/L (ref 150–450)
PLATELET # BLD AUTO: 76 10E9/L (ref 150–450)
POTASSIUM SERPL-SCNC: 2.8 MMOL/L (ref 3.4–5.3)
PROT SERPL-MCNC: 6.1 G/DL (ref 6.8–8.8)
RBC # BLD AUTO: 3.99 10E12/L (ref 4.4–5.9)
RBC # BLD AUTO: 4.05 10E12/L (ref 4.4–5.9)
RBC #/AREA URNS AUTO: 39 /HPF (ref 0–2)
SODIUM SERPL-SCNC: 141 MMOL/L (ref 133–144)
SOURCE: ABNORMAL
SP GR UR STRIP: 1.02 (ref 1–1.03)
TRANS CELLS #/AREA URNS HPF: <1 /HPF (ref 0–1)
UROBILINOGEN UR STRIP-MCNC: 2 MG/DL (ref 0–2)
WBC # BLD AUTO: 12.4 10E9/L (ref 4–11)
WBC # BLD AUTO: 3.6 10E9/L (ref 4–11)
WBC #/AREA URNS AUTO: <1 /HPF (ref 0–2)

## 2018-01-12 PROCEDURE — 25000132 ZZH RX MED GY IP 250 OP 250 PS 637: Performed by: EMERGENCY MEDICINE

## 2018-01-12 PROCEDURE — 87186 SC STD MICRODIL/AGAR DIL: CPT | Performed by: EMERGENCY MEDICINE

## 2018-01-12 PROCEDURE — 71046 X-RAY EXAM CHEST 2 VIEWS: CPT

## 2018-01-12 PROCEDURE — 96374 THER/PROPH/DIAG INJ IV PUSH: CPT | Performed by: EMERGENCY MEDICINE

## 2018-01-12 PROCEDURE — 83605 ASSAY OF LACTIC ACID: CPT | Performed by: EMERGENCY MEDICINE

## 2018-01-12 PROCEDURE — 80053 COMPREHEN METABOLIC PANEL: CPT | Performed by: EMERGENCY MEDICINE

## 2018-01-12 PROCEDURE — 25000128 H RX IP 250 OP 636: Performed by: EMERGENCY MEDICINE

## 2018-01-12 PROCEDURE — 87077 CULTURE AEROBIC IDENTIFY: CPT | Performed by: EMERGENCY MEDICINE

## 2018-01-12 PROCEDURE — 87040 BLOOD CULTURE FOR BACTERIA: CPT | Performed by: EMERGENCY MEDICINE

## 2018-01-12 PROCEDURE — 81001 URINALYSIS AUTO W/SCOPE: CPT | Performed by: EMERGENCY MEDICINE

## 2018-01-12 PROCEDURE — 87800 DETECT AGNT MULT DNA DIREC: CPT | Performed by: EMERGENCY MEDICINE

## 2018-01-12 PROCEDURE — 85025 COMPLETE CBC W/AUTO DIFF WBC: CPT | Performed by: EMERGENCY MEDICINE

## 2018-01-12 PROCEDURE — 96376 TX/PRO/DX INJ SAME DRUG ADON: CPT | Performed by: EMERGENCY MEDICINE

## 2018-01-12 PROCEDURE — 27210995 ZZH RX 272: Performed by: EMERGENCY MEDICINE

## 2018-01-12 PROCEDURE — 85610 PROTHROMBIN TIME: CPT | Performed by: EMERGENCY MEDICINE

## 2018-01-12 PROCEDURE — 96375 TX/PRO/DX INJ NEW DRUG ADDON: CPT | Performed by: EMERGENCY MEDICINE

## 2018-01-12 PROCEDURE — 99285 EMERGENCY DEPT VISIT HI MDM: CPT | Mod: Z6 | Performed by: EMERGENCY MEDICINE

## 2018-01-12 PROCEDURE — 99285 EMERGENCY DEPT VISIT HI MDM: CPT | Mod: 25 | Performed by: EMERGENCY MEDICINE

## 2018-01-12 PROCEDURE — 86140 C-REACTIVE PROTEIN: CPT | Performed by: EMERGENCY MEDICINE

## 2018-01-12 PROCEDURE — 3E0436Z INTRODUCTION OF NUTRITIONAL SUBSTANCE INTO CENTRAL VEIN, PERCUTANEOUS APPROACH: ICD-10-PCS | Performed by: INTERNAL MEDICINE

## 2018-01-12 RX ORDER — ONDANSETRON 2 MG/ML
4 INJECTION INTRAMUSCULAR; INTRAVENOUS ONCE
Status: COMPLETED | OUTPATIENT
Start: 2018-01-12 | End: 2018-01-12

## 2018-01-12 RX ORDER — IBUPROFEN 600 MG/1
600 TABLET, FILM COATED ORAL ONCE
Status: DISCONTINUED | OUTPATIENT
Start: 2018-01-12 | End: 2018-01-12

## 2018-01-12 RX ORDER — HYDROMORPHONE HYDROCHLORIDE 1 MG/ML
0.5 INJECTION, SOLUTION INTRAMUSCULAR; INTRAVENOUS; SUBCUTANEOUS ONCE
Status: COMPLETED | OUTPATIENT
Start: 2018-01-12 | End: 2018-01-12

## 2018-01-12 RX ORDER — OXYCODONE HCL 5 MG/5 ML
5 SOLUTION, ORAL ORAL ONCE
Status: COMPLETED | OUTPATIENT
Start: 2018-01-12 | End: 2018-01-12

## 2018-01-12 RX ADMIN — ACETAMINOPHEN 650 MG: 160 SUSPENSION ORAL at 02:13

## 2018-01-12 RX ADMIN — Medication 0.5 MG: at 00:40

## 2018-01-12 RX ADMIN — CEFTRIAXONE SODIUM 1 G: 10 INJECTION, POWDER, FOR SOLUTION INTRAVENOUS at 23:56

## 2018-01-12 RX ADMIN — ONDANSETRON 4 MG: 2 INJECTION INTRAMUSCULAR; INTRAVENOUS at 02:14

## 2018-01-12 RX ADMIN — OXYCODONE HYDROCHLORIDE 5 MG: 5 SOLUTION ORAL at 03:20

## 2018-01-12 RX ADMIN — Medication 0.5 MG: at 02:13

## 2018-01-12 ASSESSMENT — ENCOUNTER SYMPTOMS
BACK PAIN: 1
VOMITING: 0
SORE THROAT: 0
VOMITING: 1
COUGH: 0
FEVER: 1
FEVER: 0
COUGH: 1
BACK PAIN: 1
NAUSEA: 1
NAUSEA: 1

## 2018-01-12 NOTE — TELEPHONE ENCOUNTER
Checked with [facility] concerning contacting Parker concerning the positive blood cultures. They are aware of the findings and have tried to contact him as well. The staff there has the ability to text them as well.    Esteban Daly MD

## 2018-01-12 NOTE — IP AVS SNAPSHOT
MRN:[id number]                      After Visit Summary   1/12/2018    Parker Acevedo     MRN: [id number]           Thank you!     Thank you for choosing [facility] for your care. Our goal is always to provide you with excellent care. Hearing back from our patients is one way we can continue to improve our services. Please take a few minutes to complete the written survey that you may receive in the mail after you visit with us. Thank you!        Patient Information     Date Of Birth          1970        Designated Caregiver       Most Recent Value    Caregiver    Will someone help with your care after discharge? yes    Name of designated caregiver Rose    Phone number of caregiver [phone]    Caregiver address [place]      About your hospital stay     You were admitted on:  January 13, 2018 You last received care in the:  [address] [facility] [facility]    You were discharged on:  January 17, 2018       Who to Call     For medical emergencies, please call 911.  For non-urgent questions about your medical care, please call your primary care provider or clinic, [phone]          Attending Provider     Provider Specialty    Chuy Stock MD Emergency Medicine    Puga, Shannon Sim MD Internal Medicine    Brennon, Patti Hassan MD Internal Medicine    German, MD Shola Internal Medicine       Primary Care Provider Office Phone # Fax #    Esteban Daly [phone]      After Care Instructions     IV access       **Ordering Provider MUST call/page Care Coordinator/ to discuss arranging this service**    You are going home with the following vascular access device: Toi.  [facility] will follow you for this line.  Please follow their recommendations and use gloves to access this line.                  Your next 10 appointments already scheduled     Apr 04, 2018  1:30 PM CDT   Return Visit with MD [facility]  [facility] ([facility])    [address]  [place] [address] [phone]              Additional Services     Home infusion referral       Your provider has referred you to: [facility]: [facility] [place] [phone]   [email or web address]    Local Address (if different from home address): N/A    Anticipated Length of Therapy: Indefinite for the TPN  ---Continue Vancomycin and Ceftriaxone as ordered. Plan to complete a 14 day course of both antibiotics the follow up with PCP.  Esteban Daly   Home Infusion Pharmacist to adjust therapy based on labs and clinical assessments: Yes  [facility] Pharmacist to formulate TPN for home use.      Labs:  May draw labs from Venous Catheter: Yes  Home Infusion Pharmacist to order labs based on therapy type and clinical assessments: Yes  Call/Fax Lab Results to: Dr. Vyas with Bariatrics for TPN and Esteban Dukes for abx labs.     Agency Staff to assess nursing needs for Infusion Therapy.    Access Device Management:  IV Access Type: Cm  Flush with Heparin and Normal Saline IVP PRN and routine site care (per agency protocol) to maintain access device? Yes     Associated Diagnoses     Gastric bypass status for obesity [Z98.84]  - Primary    Infection of venous access port, initial encounter [T80.219A]    Malnutrition, unspecified type (H) [E46]                  Pending Results     Date and Time Order Name Status Description    1/15/2018 1450 Catheter Tip Culture Aerobic Bacterial Preliminary     1/14/2018 0809 Blood culture Preliminary     1/14/2018 0809 Blood culture Preliminary     1/13/2018 0843 Blood culture Preliminary     1/13/2018 0843 Blood culture Preliminary     1/12/2018 2329 Blood culture Preliminary     1/12/2018 0012 Blood culture Preliminary             Statement of Approval     Ordered          01/17/18 1113  I have reviewed and agree with  "all the recommendations and orders detailed in this document.  EFFECTIVE NOW     Approved and electronically signed by:  Koko Dozier PA-C             Admission Information     Date & Time Provider Department Dept. Phone    1/12/2018 Shola Porter MD [address] [facility] [phone]      Your Vitals Were     Blood Pressure Pulse Temperature Respirations Height Weight    136/84 (BP Location: Left arm) 67 95.9  F (35.5  C) (Oral) 18 1.803 m (5' 11\") 84.4 kg (186 lb)    Pulse Oximetry BMI (Body Mass Index)                98% 25.94 kg/m2          MyChart Information     [employer] gives you secure access to your electronic health record. If you see a primary care provider, you can also send messages to your care team and make appointments. If you have questions, please call your primary care clinic.  If you do not have a primary care provider, please call [phone] and they will assist you.        Care EveryWhere ID     This is your Care EveryWhere ID. This could be used by other organizations to access your [place] medical records  [id number]        Equal Access to Services     [facility]: Hadii kameron reyes Soheike, waaxda lupatric, qaybta kaalmavictoriano stock, carmela rizvi. So [facility] [phone].    ATENCIÓN: Si habla español, tiene a hicks disposición servicios gratuitos de asistencia lingüística. Chu al [phone].    We comply with applicable federal civil rights laws and [place] laws. We do not discriminate on the basis of race, color, national origin, age, disability, sex, sexual orientation, or gender identity.               Review of your medicines      START taking        Dose / Directions    diphenhydrAMINE 12.5 MG/5ML solution   Commonly known as:  BENADRYL   Used for:  Itching, Rash        Dose:  25 mg   Take 10 mLs (25 mg) by mouth 2 times daily   Quantity:  480 mL   Refills:  0       vancomycin 1,750 mg   Indication:  Bacteria in the Blood   Used for:  " Infection of venous access port, initial encounter        Dose:  1750 mg   Inject 1,750 mg into the vein every 12 hours   Refills:  0         CONTINUE these medicines which have NOT CHANGED        Dose / Directions    albuterol 108 (90 BASE) MCG/ACT Inhaler   Commonly known as:  PROAIR HFA/PROVENTIL HFA/VENTOLIN HFA   Used for:  Aspiration pneumonitis (H)        Dose:  2 puff   Inhale 2 puffs into the lungs every 4 hours as needed for shortness of breath / dyspnea or wheezing   Quantity:  1 Inhaler   Refills:  3       * amphetamine-dextroamphetamine 20 MG per tablet   Commonly known as:  ADDERALL   Used for:  ADHD (attention deficit hyperactivity disorder), inattentive type        Dose:  20 mg   Take 1 tablet (20 mg) by mouth 2 times daily   Quantity:  60 tablet   Refills:  0       * amphetamine-dextroamphetamine 20 MG per tablet   Commonly known as:  ADDERALL   Used for:  ADHD (attention deficit hyperactivity disorder), inattentive type        Dose:  20 mg   Take 1 tablet (20 mg) by mouth 2 times daily   Quantity:  60 tablet   Refills:  0       * amphetamine-dextroamphetamine 20 MG per tablet   Commonly known as:  ADDERALL   Used for:  ADHD (attention deficit hyperactivity disorder), inattentive type        Dose:  20 mg   Take 1 tablet (20 mg) by mouth 2 times daily   Quantity:  60 tablet   Refills:  0       cefTRIAXone 1 GM vial   Commonly known as:  ROCEPHIN   Used for:  Infection of venous access port, initial encounter        Dose:  1000 mg   Inject 1 g (1,000 mg) into the vein every 24 hours   Refills:  0       COREG PO        Dose:  12.5 mg   Take 12.5 mg by mouth 2 times daily   Refills:  0       * cyanocobalamin 1000 MCG/ML injection   Commonly known as:  VITAMIN B12        Dose:  1 mL   Inject 1 mL into the muscle every 30 days   Refills:  0       * cyanocobalamin 1000 MCG/ML injection   Commonly known as:  VITAMIN B12   Used for:  Bariatric surgery status        Dose:  1 mL   Inject 1 mL (1,000 mcg) into  "the muscle every 30 days   Quantity:  1 mL   Refills:  11       * [facility] HOME INFUSION MANAGED PATIENT   Used for:  S/P bariatric surgery        Contact [facility] for patient specific medication information at [phone] on admission and discharge from the hospital.  Phones are answered 24 hours a day 7 days a week 365 days a year.  Providers - Choose \"CONTINUE HOME MED (no script)\" at discharge if patient treatment with home infusion will continue.   Refills:  0       * [facility] HOME INFUSION MANAGED PATIENT   Used for:  Severe malnutrition (H)        Contact [facility] for patient specific medication information at [phone] on admission and discharge from the hospital.  Phones are answered 24 hours a day 7 days a week 365 days a year.  Providers - Choose \"CONTINUE HOME MED (no script)\" at discharge if patient treatment with home infusion will continue.   Refills:  0       fentaNYL 50 mcg/hr 72 hr patch   Commonly known as:  DURAGESIC   Used for:  Chronic abdominal pain        Dose:  1 patch   Place 1 patch onto the skin every 48 hours MYLAN BRAND ONLY. Fill on/after 1/12/18 to start on/after 1/15/18   Quantity:  15 patch   Refills:  0       lidocaine 5 % Patch   Commonly known as:  LIDODERM   Used for:  Chronic bilateral low back pain without sciatica, Chronic abdominal pain, Myofascial pain        Dose:  1-2 patch   Place 1-2 patches onto the skin every 24 hours Wear for 12 hours, remove for 12 hours.  OK to cut to better fit to size.   Quantity:  60 patch   Refills:  6       lidocaine-prilocaine cream   Commonly known as:  EMLA   Used for:  Pain following surgery or procedure        Apply topically as needed for moderate pain   Quantity:  30 g   Refills:  11       morphine 0.1% in intrasite topical gel   Used for:  Pain following surgery or procedure        Apply as needed prior to accessing the port site.   Quantity:  100 g   Refills:  0       MULTI-VITAMINS Tabs        " "Dose:  1 tablet   Take 1 tablet by mouth   Refills:  0       mupirocin 2 % ointment   Commonly known as:  BACTROBAN   Used for:  Skin infection        Apply topically 3 times daily   Quantity:  30 g   Refills:  3       naloxone nasal spray   Commonly known as:  NARCAN   Used for:  Encounter for long-term opiate analgesic use, Chronic abdominal pain        Dose:  4 mg   Spray 1 spray (4 mg) into one nostril alternating nostrils as needed for opioid reversal (every 2-3 minutes until assistance arrives.)   Quantity:  0.2 mL   Refills:  0       Needle (Disp) 27G X 1/2\" Misc   Commonly known as:  BD DISP NEEDLES   Used for:  Bariatric surgery status        Dose:  1 Device   1 Device every 30 days Use for cyanocobalamin injection once q 30 days.   Quantity:  3 each   Refills:  4       nystatin-triamcinolone cream   Commonly known as:  MYCOLOG II   Used for:  Skin irritation        Apply topically 2 times daily as needed   Quantity:  60 g   Refills:  5       ondansetron 8 MG ODT tab   Commonly known as:  ZOFRAN-ODT   Used for:  Nausea        Dose:  8 mg   Take 1 tablet (8 mg) by mouth every 8 hours as needed for nausea   Quantity:  90 tablet   Refills:  3       * order for DME   Used for:  Bariatric surgery status        Injection Supplies for Vitamin B12: 3cc syringes w/ 27 gauge needles, 1/2 inch length   Quantity:  12 each   Refills:  0       * order for DME   Used for:  Bilateral edema of lower extremity        Equipment being ordered: Bilateral knee high chronic venous insufficiency stockings--  mild-moderate pressures.   Quantity:  4 each   Refills:  5       oxyCODONE 5 MG/5ML solution   Commonly known as:  ROXICODONE   Used for:  Encounter for long-term opiate analgesic use        Dose:  10-15 mg   Take 10-15 mLs (10-15 mg) by mouth every 4 hours as needed for moderate to severe pain Max of 45 mg per day. Fill on/after 1/12/18 not to start untill 1/15/18.   Quantity:  1350 mL   Refills:  0       sucralfate 1 GM/10ML " suspension   Commonly known as:  CARAFATE   Used for:  Nausea        Dose:  1 g   Take 10 mLs (1 g) by mouth 4 times daily   Quantity:  1200 mL   Refills:  11       vitamin D 50634 UNIT capsule   Commonly known as:  ERGOCALCIFEROL   Used for:  Vitamin D deficiency        Dose:  15303 Units   Take 1 capsule (50,000 Units) by mouth every 7 days   Quantity:  12 capsule   Refills:  3       * Notice:  This list has 9 medication(s) that are the same as other medications prescribed for you. Read the directions carefully, and ask your doctor or other care provider to review them with you.      STOP taking     potassium bicarbonate 25 MEQ solu-tab   Commonly known as:  K-LYTE                Where to get your medicines      These medications were sent to [facility] - [place], [place] - [address]  [address] [address]     Phone:  [phone]     diphenhydrAMINE 12.5 MG/5ML solution               ANTIBIOTIC INSTRUCTION     You've Been Prescribed an Antibiotic - Now What?  Your healthcare team thinks that you or your loved one might have an infection. Some infections can be treated with antibiotics, which are powerful, life-saving drugs. Like all medications, antibiotics have side effects and should only be used when necessary. There are some important things you should know about your antibiotic treatment.      Your healthcare team may run tests before you start taking an antibiotic.    Your team may take samples (e.g., from your blood, urine or other areas) to run tests to look for bacteria. These test can be important to determine if you need an antibiotic at all and, if you do, which antibiotic will work best.      Within a few days, your healthcare team might change or even stop your antibiotic.    Your team may start you on an antibiotic while they are working to find out what is making you sick.    Your team might change your antibiotic because test results show that a  different antibiotic would be better to treat your infection.    In some cases, once your team has more information, they learn that you do not need an antibiotic at all. They may find out that you don't have an infection, or that the antibiotic you're taking won't work against your infection. For example, an infection caused by a virus can't be treated with antibiotics. Staying on an antibiotic when you don't need it is more likely to be harmful than helpful.      You may experience side effects from your antibiotic.    Like all medications, antibiotics have side effects. Some of these can be serious.    Let you healthcare team know if you have any known allergies when you are admitted to the hospital.    One significant side effect of nearly all antibiotics is the risk of severe and sometimes deadly diarrhea caused by Clostridium difficile (C. Difficile). This occurs when a person takes antibiotics because some good germs are destroyed. Antibiotic use allows C. diificile to take over, putting patients at high risk for this serious infection.    As a patient or caregiver, it is important to understand your or your loved one's antibiotic treatment. It is especially important for caregivers to speak up when patients can't speak for themselves. Here are some important questions to ask your healthcare team.    What infection is this antibiotic treating and how do you know I have that infection?    What side effects might occur from this antibiotic?    How long will I need to take this antibiotic?    Is it safe to take this antibiotic with other medications or supplements (e.g., vitamins) that I am taking?     Are there any special directions I need to know about taking this antibiotic? For example, should I take it with food?    How will I be monitored to know whether my infection is responding to the antibiotic?    What tests may help to make sure the right antibiotic is prescribed for me?      Information provided  by:  www.cdc.gov/getsmart  U.S. Department of Health and Human Services  Centers for disease Control and Prevention  National Center for Emerging and Zoonotic Infectious Diseases  Division of Healthcare Quality Promotion         Protect others around you: Learn how to safely use, store and throw away your medicines at www.disposemymeds.org.             Medication List: This is a list of all your medications and when to take them. Check marks below indicate your daily home schedule. Keep this list as a reference.      Medications           Morning Afternoon Evening Bedtime As Needed    albuterol 108 (90 BASE) MCG/ACT Inhaler   Commonly known as:  PROAIR HFA/PROVENTIL HFA/VENTOLIN HFA   Inhale 2 puffs into the lungs every 4 hours as needed for shortness of breath / dyspnea or wheezing                                * amphetamine-dextroamphetamine 20 MG per tablet   Commonly known as:  ADDERALL   Take 1 tablet (20 mg) by mouth 2 times daily                                * amphetamine-dextroamphetamine 20 MG per tablet   Commonly known as:  ADDERALL   Take 1 tablet (20 mg) by mouth 2 times daily                                * amphetamine-dextroamphetamine 20 MG per tablet   Commonly known as:  ADDERALL   Take 1 tablet (20 mg) by mouth 2 times daily                                cefTRIAXone 1 GM vial   Commonly known as:  ROCEPHIN   Inject 1 g (1,000 mg) into the vein every 24 hours   Last time this was given:  1 g on 1/16/2018 10:21 PM                                COREG PO   Take 12.5 mg by mouth 2 times daily                                * cyanocobalamin 1000 MCG/ML injection   Commonly known as:  VITAMIN B12   Inject 1 mL into the muscle every 30 days                                * cyanocobalamin 1000 MCG/ML injection   Commonly known as:  VITAMIN B12   Inject 1 mL (1,000 mcg) into the muscle every 30 days                                diphenhydrAMINE 12.5 MG/5ML solution   Commonly known as:  BENADRYL  "  Take 10 mLs (25 mg) by mouth 2 times daily   Last time this was given:  25 mg on 1/17/2018 11:12 AM                                * [facility] HOME INFUSION MANAGED PATIENT   Contact [facility] for patient specific medication information at [phone] on admission and discharge from the hospital.  Phones are answered 24 hours a day 7 days a week 365 days a year.  Providers - Choose \"CONTINUE HOME MED (no script)\" at discharge if patient treatment with home infusion will continue.                                * [facility] HOME INFUSION MANAGED PATIENT   Contact [facility] for patient specific medication information at [phone] on admission and discharge from the hospital.  Phones are answered 24 hours a day 7 days a week 365 days a year.  Providers - Choose \"CONTINUE HOME MED (no script)\" at discharge if patient treatment with home infusion will continue.                                fentaNYL 50 mcg/hr 72 hr patch   Commonly known as:  DURAGESIC   Place 1 patch onto the skin every 48 hours MYLAN BRAND ONLY. Fill on/after 1/12/18 to start on/after 1/15/18   Last time this was given:  1 patch on 1/16/2018  8:52 AM                                lidocaine 5 % Patch   Commonly known as:  LIDODERM   Place 1-2 patches onto the skin every 24 hours Wear for 12 hours, remove for 12 hours.  OK to cut to better fit to size.                                lidocaine-prilocaine cream   Commonly known as:  EMLA   Apply topically as needed for moderate pain                                morphine 0.1% in intrasite topical gel   Apply as needed prior to accessing the port site.                                MULTI-VITAMINS Tabs   Take 1 tablet by mouth                                mupirocin 2 % ointment   Commonly known as:  BACTROBAN   Apply topically 3 times daily                                naloxone nasal spray   Commonly known as:  NARCAN   Spray 1 spray (4 mg) into one nostril " "alternating nostrils as needed for opioid reversal (every 2-3 minutes until assistance arrives.)                                Needle (Disp) 27G X 1/2\" Misc   Commonly known as:  BD DISP NEEDLES   1 Device every 30 days Use for cyanocobalamin injection once q 30 days.                                nystatin-triamcinolone cream   Commonly known as:  MYCOLOG II   Apply topically 2 times daily as needed                                ondansetron 8 MG ODT tab   Commonly known as:  ZOFRAN-ODT   Take 1 tablet (8 mg) by mouth every 8 hours as needed for nausea   Last time this was given:  4 mg on 1/17/2018 12:13 AM                                * order for DME   Injection Supplies for Vitamin B12: 3cc syringes w/ 27 gauge needles, 1/2 inch length                                * order for DME   Equipment being ordered: Bilateral knee high chronic venous insufficiency stockings--  mild-moderate pressures.                                oxyCODONE 5 MG/5ML solution   Commonly known as:  ROXICODONE   Take 10-15 mLs (10-15 mg) by mouth every 4 hours as needed for moderate to severe pain Max of 45 mg per day. Fill on/after 1/12/18 not to start untill 1/15/18.   Last time this was given:  15 mg on 1/17/2018  8:02 AM                                sucralfate 1 GM/10ML suspension   Commonly known as:  CARAFATE   Take 10 mLs (1 g) by mouth 4 times daily   Last time this was given:  1 g on 1/17/2018 11:06 AM                                vancomycin 1,750 mg   Inject 1,750 mg into the vein every 12 hours   Last time this was given:  1,750 mg on 1/17/2018 11:12 AM                                vitamin D 10968 UNIT capsule   Commonly known as:  ERGOCALCIFEROL   Take 1 capsule (50,000 Units) by mouth every 7 days   Last time this was given:  50,000 Units on 1/14/2018  7:14 PM                                * Notice:  This list has 9 medication(s) that are the same as other medications prescribed for you. Read the directions " carefully, and ask your doctor or other care provider to review them with you.

## 2018-01-12 NOTE — ED PROVIDER NOTES
"    [place] EMERGENCY DEPARTMENT ([facility])  1/11/18   History     Chief Complaint   Patient presents with     Back Pain     HPI  Parker Acevedo Sr is a 47 year old male with a medical history significant for gastric ulcer disease, chronic abdominal pain, GERD, short gut syndrome, s/p Celestino-en-Y bowel (2003), s/p cholecystectomy, s/p gastrojejunostomy, s/p gastrectomy, s/p gastric bypass, s/p appendectomy, s/p PICC line insertion and s/p port placement who presents to the Emergency Department for evaluation of back pain, fever, nausea and vomiting.  Patient reports that he started having back pain at approximately 9 PM tonight, which he states is \"200/10 in severity\".  He states that the pain is in the middle of his back and feels similar to when he has had blood infections in the past.  However, he states that this is worse than in the past.  He also reports that he spiked a fever tonight of 102 F, which he took Tylenol for just prior to arrival.  He also states that he was feeling nauseous today and began vomiting, which she took Zofran 4.  He also complains that he has had a cough with mild productiveness.  He also states that he has not urinated all day today.  Patient is on TPN at home and gets hydrations during the day.  He denies any positive sick contacts at home, denies any sore throat and denies any history of kidney infection.  Patient did receive his influenza immunization this year.    I have reviewed the Medications, Allergies, Past Medical and Surgical History, and Social History in the [employer] system.    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)      Chronic abdominal pain      Difficulty swallowing      Gastric ulcer, unspecified as acute or chronic, without mention of " hemorrhage, perforation, or obstruction      Gastro-oesophageal reflux disease      Head injury      Hiatal hernia      Other bladder disorder      Other chronic pain      Severe malnutrition (H)     TPN     Short gut syndrome      Tobacco abuse        Past Surgical History:   Procedure Laterality Date     APPENDECTOMY       BACK SURGERY  11/3/2014    curve in the spine     BIOPSY LYMPH NODE CERVICAL N/A 2/20/2015    Procedure: BIOPSY LYMPH NODE CERVICAL;  Surgeon: Baron Scanlon MD;  Location: [facility] OR     C GASTRIC BYPASS,OBESE<100CM SHAYLEE-EN-Y  2002    lost 300 pounds     CHOLECYSTECTOMY       DISCECTOMY, FUSION CERVICAL ANTERIOR ONE LEVEL, COMBINED N/A 2/15/2017    Procedure: COMBINED DISCECTOMY, FUSION CERVICAL ANTERIOR ONE LEVEL;  Surgeon: Darren Campos MD;  Location: [facility]     ENDOSCOPIC INSERTION TUBE GASTROSTOMY  9/9/2013    Procedure: ENDOSCOPIC INSERTION TUBE GASTROSTOMY;;  Surgeon: Francis Vyas MD;  [facility]     ENDOSCOPIC ULTRASOUND UPPER GASTROINTESTINAL TRACT (GI)  4/29/2011    Procedure:ENDOSCOPIC ULTRASOUND UPPER GASTROINTESTINAL TRACT (GI); Both Procedures done Conjointly; Surgeon:NEREIDA HOUSER; Location:[facility]     ENDOSCOPIC ULTRASOUND UPPER GASTROINTESTINAL TRACT (GI)  9/9/2013    Procedure: ENDOSCOPIC ULTRASOUND UPPER GASTROINTESTINAL TRACT (GI);  Endoscopic Ultrasound Guide Gastrostomy Tube Placement  C-arm;  Surgeon: Noe Lizarraga MD;  [facility]     ENDOSCOPY  03/25/11    EGD, [facility]     ENDOSCOPY  08/04/09    Upper Endoscopy, [facility]     ENDOSCOPY  01/05/09    Upper Endoscopy, [facility]     ESOPHAGOSCOPY, GASTROSCOPY, DUODENOSCOPY (EGD), COMBINED  4/20/2011    Procedure:COMBINED ESOPHAGOSCOPY, GASTROSCOPY, DUODENOSCOPY (EGD); Surgeon:FRANCIS VYAS; [facility]     ESOPHAGOSCOPY, GASTROSCOPY, DUODENOSCOPY (EGD), COMBINED  6/15/2011    Procedure:COMBINED ESOPHAGOSCOPY, GASTROSCOPY, DUODENOSCOPY (EGD); Surgeon:SILVIA  BLU CLEMENTS; [facility]     ESOPHAGOSCOPY, GASTROSCOPY, DUODENOSCOPY (EGD), COMBINED  6/12/2013    Procedure: COMBINED ESOPHAGOSCOPY, GASTROSCOPY, DUODENOSCOPY (EGD);;  Surgeon: Blu Vyas MD;  [facility]     ESOPHAGOSCOPY, GASTROSCOPY, DUODENOSCOPY (EGD), COMBINED  11/22/2013    Procedure: COMBINED ESOPHAGOSCOPY, GASTROSCOPY, DUODENOSCOPY (EGD);;  Surgeon: Blu Vyas MD;  [facility]     ESOPHAGOSCOPY, GASTROSCOPY, DUODENOSCOPY (EGD), COMBINED  4/30/2014    Procedure: COMBINED ESOPHAGOSCOPY, GASTROSCOPY, DUODENOSCOPY (EGD);  Surgeon: Blu Vyas MD;  [facility]     ESOPHAGOSCOPY, GASTROSCOPY, DUODENOSCOPY (EGD), COMBINED N/A 2/20/2015    Procedure: COMBINED ESOPHAGOSCOPY, GASTROSCOPY, DUODENOSCOPY (EGD), BIOPSY SINGLE OR MULTIPLE;  Surgeon: Baron Scanlon MD;  Location:  OR     ESOPHAGOSCOPY, GASTROSCOPY, DUODENOSCOPY (EGD), COMBINED N/A 9/30/2015    Procedure: COMBINED ESOPHAGOSCOPY, GASTROSCOPY, DUODENOSCOPY (EGD);  Surgeon: Blu Vyas MD;  Location:  GI     GASTRECTOMY  6/22/2012    Procedure: GASTRECTOMY;  Open Approach, Excise Ulcers,Partial Gastrectomy, Esophagojejunostomy, Hiatal Hernia Repair, Extensive Lysis of Adhesions and Esaphagogastrodudenoscopy.;  Surgeon: Blu Vyas MD;  [facility]     GASTROJEJUNOSTOMY  08/26/09    Extensice enterolysis, partial resect. jejunum, part. resect gastric pouch, gastrojejunostomy anastomosis     HC ESOPH/GAS REFLUX TEST W NASAL IMPED ELECTRODE  8/5/2013    Procedure: ESOPHAGEAL IMPEDENCE FUNCTION TEST 1 HOUR OR LESS;  Surgeon: Halie Lang MD;  Location:  GI     HEAD & NECK SURGERY  2/15/2017    C5-C6     HERNIA REPAIR  2006    Umbilical hernia     HERNIORRHAPHY HIATAL  6/22/2012    Procedure: HERNIORRHAPHY HIATAL;;  Surgeon: Blu Vyas MD;  [facility]     HERNIORRHAPHY INGUINAL  11/22/2013    Procedure: HERNIORRHAPHY INGUINAL;;  Surgeon: Blu Vyas  MD Lg;  [facility]     INSERT PORT VASCULAR ACCESS Right 12/19/2017    Procedure: INSERT PORT VASCULAR ACCESS;  Right Chest Port Placement ;  Surgeon: Lisandro Alejandro PA-C;  [facility]     LAPAROTOMY EXPLORATORY  11/22/2013    Procedure: LAPAROTOMY EXPLORATORY;  Exploratory Laparotomy, Upper Endoscopy, Left Inguinal Hernia Repair;  Surgeon: Francis Vyas MD;  [facility]     PICC INSERTION Right 03/16/2017    5fr DL BioFlo PICC, 42cm (3cm external) in the R medial brachial vein w/ tip in the SVC RA junction.     PICC INSERTION Left 09/23/2017    5fr DL BioFlo PICC, 45cm (1cm external) in the L basilic vein w/ tip in the SVC RA junction.     SHAYLEE EN Y BOWEL  2003     SOFT TISSUE SURGERY       SOFT TISSUE SURGERY       TONSILLECTOMY         Family History   Problem Relation Age of Onset     GASTROINTESTINAL DISEASE Mother      Crohns disease     Anxiety Disorder Mother      Thyroid Disease Mother      Grave's disease     CANCER Father      ear cancer-skin cancer/melanoma     Breast Cancer Maternal Grandmother      DIABETES Maternal Uncle      Breast Cancer Other      Hypertension No family hx of      Hyperlipidemia No family hx of      CEREBROVASCULAR DISEASE No family hx of      Prostate Cancer No family hx of      Depression No family hx of      Anesthesia Reaction No family hx of      Asthma No family hx of      OSTEOPOROSIS No family hx of      Genetic Disorder No family hx of      Obesity No family hx of      MENTAL ILLNESS No family hx of      Substance Abuse No family hx of        Social History   Substance Use Topics     Smoking status: Current Some Day Smoker     Packs/day: 0.10     Years: 3.00     Types: Cigarettes     Smokeless tobacco: Current User      Comment: I use an e cig every now and than     Alcohol use No      Comment: quit        Current Facility-Administered Medications   Medication     HYDROmorphone (PF) (DILAUDID) injection 0.5 mg     ibuprofen (ADVIL/MOTRIN)  "tablet 600 mg     Current Outpatient Prescriptions   Medication     fentaNYL (DURAGESIC) 50 mcg/hr 72 hr patch     oxyCODONE (ROXICODONE) 5 MG/5ML solution     morphine 0.1% in intrasite topical gel     lidocaine (LIDODERM) 5 % Patch     Multiple Vitamin (MULTI-VITAMINS) TABS     amphetamine-dextroamphetamine (ADDERALL) 20 MG per tablet     amphetamine-dextroamphetamine (ADDERALL) 20 MG per tablet     amphetamine-dextroamphetamine (ADDERALL) 20 MG per tablet     nystatin-triamcinolone (MYCOLOG II) cream     mupirocin (BACTROBAN) 2 % ointment     Needle, Disp, (BD DISP NEEDLES) 27G X 1/2\" MISC     sucralfate (CARAFATE) 1 GM/10ML suspension     ondansetron (ZOFRAN-ODT) 8 MG ODT tab     cyanocobalamin (VITAMIN B12) 1000 MCG/ML injection     lidocaine-prilocaine (EMLA) cream     albuterol (PROAIR HFA/PROVENTIL HFA/VENTOLIN HFA) 108 (90 BASE) MCG/ACT Inhaler     vitamin D (ERGOCALCIFEROL) 47722 UNIT capsule     Carvedilol (COREG PO)     cyanocobalamin (VITAMIN B12) 1000 MCG/ML injection     naloxone (NARCAN) nasal spray     [facility] INFUSION MANAGED PATIENT     order for DME     [facility] INFUSION MANAGED PATIENT     order for DME     [DISCONTINUED] NO ACTIVE MEDICATIONS        Allergies   Allergen Reactions     Bactrim [Sulfamethoxazole W/Trimethoprim] Rash     Penicillins Anaphylaxis     Doxycycline Rash     Vancomycin Rash     Rash after receiving vancomycin 3/28/16 (red man's?). Tolerated with slower infusion and diphenhydramine premed.         Review of Systems   Constitutional: Positive for fever.   HENT: Negative for sore throat.    Respiratory: Positive for cough.    Gastrointestinal: Positive for nausea and vomiting.   Musculoskeletal: Positive for back pain.   All other systems reviewed and are negative.      Physical Exam   BP: 122/76  Pulse: 95  Temp: 101.2  F (38.4  C)  Resp: 16  Height: 180.3 cm (5' 11\")  Weight: 84.4 kg (186 lb)  SpO2: 100 %      Physical Exam   Constitutional: He is oriented to " person, place, and time. No distress.   HENT:   Head: Normocephalic and atraumatic.   Right Ear: External ear normal.   Left Ear: External ear normal.   Mouth/Throat: Oropharynx is clear and moist. No oropharyngeal exudate.   Eyes: Conjunctivae are normal. Pupils are equal, round, and reactive to light. Right eye exhibits no discharge. Left eye exhibits no discharge.   Neck: Normal range of motion. Neck supple.   Cardiovascular: Normal rate and intact distal pulses.    Pulmonary/Chest: Effort normal. No respiratory distress. He has no wheezes. He has no rales. He exhibits no tenderness.   Abdominal: Soft. He exhibits no distension. There is no tenderness. There is no rebound and no guarding.   G tube intact   Musculoskeletal: Normal range of motion. He exhibits no edema or tenderness.   Neurological: He is alert and oriented to person, place, and time.   Skin: Skin is warm and dry. No rash noted. He is not diaphoretic.   Psychiatric: He has a normal mood and affect. His behavior is normal. Thought content normal.   Nursing note and vitals reviewed.      ED Course   12:13 AM  The patient was seen and examined by Rosas Maya MD in Room ED10.     ED Course     Procedures             Critical Care time:  none             Labs Ordered and Resulted from Time of ED Arrival Up to the Time of Departure from the ED - No data to display         Assessments & Plan (with Medical Decision Making)   1.  Fever  2.  Back pain    Parker Acevedo Sr is a 47 year old male with a medical history significant for gastric ulcer Celestino-en-Y bowel (2003), with short gut syndrome requiring TPN with multiple  PICC and port infections who presents with fever and back pain.  On exam try is febrile to 101.2 but has normal vital signs.  His exam is benign.  Urinalysis with no signs of infection and chest x-ray appears unchanged.   Lab evaluation is significant for a potassium of 2.8.  Parker refused any potassium replacement and said he  would have this adjusted in his TPN in the morning.  Blood cultures were obtained and are pending.  Unclear source of fever.  Will discharge and monitor cultures.  If any worsening symptoms, Parker should return to the ER.       I have reviewed the nursing notes.    I have reviewed the findings, diagnosis, plan and need for follow up with the patient.    New Prescriptions    No medications on file       Final diagnoses:   None     ITimothy, am serving as a trained medical scribe to document services personally performed by Rosas Maya MD, based on the provider's statements to me.   IRosas MD, was physically present and have reviewed and verified the accuracy of this note documented by Timothy Venegas.    1/11/2018   [facility], [place], EMERGENCY DEPARTMENT     Rosas Maya MD  01/12/18 0340

## 2018-01-12 NOTE — PROGRESS NOTES
[facility] calling to make sure that PCP and team follow up with patient. Please return call to Perla at [phone] main line. They are not sure what they should be doing for him.     Jillian Roman  Reception/ Scheduling

## 2018-01-12 NOTE — ED NOTES
"Pt was BIBA complaining of low back pain since about 2100 tonight. He has a hx of gastric bypass 10 yrs ago and has had many complications since. The pain tonight started when he started his TPN at home. He had a similar pain last night when he started his tpn, but the pain went away last night. He says he's had a \"blood infection\" in the past with similar symptoms.  "

## 2018-01-12 NOTE — DISCHARGE INSTRUCTIONS
We collected blood cultures and will contact you if they are positive.  Please return if you feel more ill.

## 2018-01-12 NOTE — ED PROVIDER NOTES
"Patient seen by Dr. Maya last night in the ER for back pain and fever. Unclear etiology and patient ok home with follow up.  Lab called this am with port blood culture growing gram neg rods, noted to be e coli.  Attempted to call patient via cell phone and left message this am. No reply. approx 11am.  Called spouse cell also but number not in service message.  Left message with mother also who is also emergency contact number.  Contacted Dr. Esteban Daly also at approx 1150 who stated the patient had just responded via \"my chart\" and they will try to communicate via \"my chart\" and instruct patient to return to the ER as will need to be admitted and treated for positive blood culture.   I attempted to call patient cell # again but no answer.  Will await response from patient PMD regarding contact with patient.    1245pm: Did contact patient wife cell phone # which is new (via home [facility] had it). Parker answered and understands that he should return to the ER at [facility] for further eval for blood infection. Patient to come to the ER for further eval and treatment of positive E coli blood culture from [facility].    Wifes new # (Rose) is [phone]     Jeet Orozco MD  01/12/18 1257    "

## 2018-01-12 NOTE — PROGRESS NOTES
Have not seen Parker for a period of time. Not sure what to order except to review the labs from the ER yesterday, correct his electrolytes and cover fluid needs with his recent fever. Maintain nutritional needs. The blood cultures need to be followed through the [facility] emergency visit and treated if positive.    Esteban Daly MD  Please close encounter when call/work completed.

## 2018-01-12 NOTE — IP AVS SNAPSHOT
"    [address] [facility]: [phone]                                              INTERAGENCY TRANSFER FORM - PHYSICIAN ORDERS   2018                    Hospital Admission Date: 2018  SARA HERRERAGLORY HENRIQUEZ   : 1970  Sex: Male        Attending Provider: Shola Porter MD     Allergies:  Bactrim [Sulfamethoxazole W/trimethoprim], Penicillins, Doxycycline, Vancomycin    Infection:  None   Service:  INTERNAL MED    Ht:  1.803 m (5' 11\")   Wt:  84.4 kg (186 lb)   Admission Wt:  84.4 kg (186 lb)    BMI:  25.94 kg/m 2   BSA:  2.06 m 2            Patient PCP Information     Provider PCP Type    Esteban Daly MD General      ED Clinical Impression     Diagnosis Description Comment Added By Time Added    Infection of venous access port, initial encounter [T80.219A] Infection of venous access port, initial encounter [T80.219A]  Chuy Stock MD 2018 11:46 PM      Hospital Problems as of 2018              Priority Class Noted POA    E coli bacteremia Medium  2018 Yes      Non-Hospital Problems as of 2018              Priority Class Noted    Peptic ulcer disease Medium  2010    Gastric bypass status for obesity Medium  2010    CARDIOVASCULAR SCREENING; LDL GOAL LESS THAN 160 Medium  10/31/2010    Chronic abdominal pain Medium  2012    Ulcer (H) Medium  2012    Vomiting Medium  2012    Anemia Medium  2012    ADHD (attention deficit hyperactivity disorder), inattentive type Medium  2013    Vitamin B12 deficiency without anemia Medium  2013    Thiamine deficiency Medium  2013    Dehydration Medium  2013    Dysphagia Medium  2013    Weight loss, non-intentional Medium  2013    Malnutrition (H) Medium  2013    Chronic anxiety Medium  2013    Constipation Medium  10/1/2013    Bile reflux esophagitis Medium  10/16/2013    Former smoker Medium  2014    Vitamin D deficiency Medium  2014    Iron deficiency Medium  " 5/23/2014    Health Care Home Medium  2/24/2015    Chronic pain Medium  7/7/2015    Insomnia Medium  8/13/2015    Positive blood culture Medium  3/29/2016    Fungemia Medium  4/11/2016    Chronic nausea Medium  5/10/2016    Gastrostomy tube in place (H) Medium  8/9/2016    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) Medium  Unknown    Severe malnutrition (H) Medium  Unknown    Short gut syndrome Medium  Unknown    Anxiety Medium  Unknown    Status post cervical spinal arthrodesis Medium  2/15/2017    Port or reservoir infection, initial encounter Medium  3/16/2017    Abnormal echocardiogram Medium  4/11/2017    Bacteremia Medium  6/29/2017    Low serum iron Medium  9/8/2017    Anemia, iron deficiency Medium  9/8/2017    Catheter-related bloodstream infection (CRBSI) Medium  9/23/2017      Code Status History     Date Active Date Inactive Code Status Order ID Comments User Context    9/23/2017  3:16 PM 1/13/2018  2:59 AM Full Code [id number]  Theresa Pink MD Outpatient    9/19/2017 10:55 PM 9/23/2017  3:16 PM Full Code [id number]  Kai Parry MD Inpatient    6/29/2017  4:29 AM 6/30/2017 12:17 PM Full Code [id number]  Kiera Santana MD Inpatient    2/15/2017  4:40 PM 2/16/2017  3:20 PM Full Code [id number]  Fernandez Mann PA-C Inpatient    2/15/2017  2:43 PM 2/15/2017  4:40 PM Full Code [id number]  Fernandez Mann PA-C Outpatient    10/28/2016 11:13 AM 2/15/2017  2:43 PM Full Code [id number]  Marko Betancourt MD Outpatient    10/25/2016  7:37 PM 10/28/2016 11:13 AM Full Code During Procedure [id number]  Jefferson See MD Inpatient    4/15/2016  1:36 PM 10/25/2016  7:37 PM Full Code [id number]  Blas Edwards MD Outpatient    4/11/2016  8:54 PM 4/15/2016  1:36 PM Full Code [id number]  Leana Jiménez PA Inpatient    4/1/2016 10:33 AM 4/11/2016  8:54 PM Full Code [id number]  Annel Biswas PA-C Outpatient    3/29/2016  3:02 AM 4/1/2016 10:33 AM Full Code [id number]  Shannon Puga  "MD Chiquis Inpatient    1/17/2016  1:38 PM 3/29/2016  3:02 AM Full Code [id number]  Ilsa Shine PA Outpatient    1/16/2016  4:34 AM 1/17/2016  1:38 PM Full Code [id number]  Tino Brown MD Inpatient    8/7/2015  3:52 PM 1/16/2016  4:34 AM Full Code [id number]  Jamil Rios MD Outpatient    8/5/2015  6:39 AM 8/7/2015  3:52 PM Full Code [id number]  Eve Monzon MD Inpatient    8/5/2015  4:50 AM 8/5/2015  6:39 AM Full Code [id number]  Eve Monzon MD Inpatient    7/30/2015  1:24 AM 8/4/2015  4:32 PM Full Code [id number]  Eve Monzon MD Inpatient    11/22/2013  8:38 PM 11/27/2013 12:57 PM Full Code [id number]  Nicolette Barrios RN Inpatient    7/15/2012  6:24 AM 7/19/2012  3:16 PM Full Code [id number]  Elaine Kinney MD Inpatient    6/28/2012 12:13 PM 7/9/2012 12:54 PM Full Code [id number]  Elaine Kinney MD ED    6/22/2012  8:30 PM 6/27/2012  3:38 PM Full Code [id number]  Jacki Summers RN Inpatient         Medication Review      UNREVIEWED medications. Ask your doctor about these medications        Dose / Directions Comments    * [facility]   Used for:  S/P bariatric surgery        Contact [facility] for patient specific medication information at [phone] on admission and discharge from the hospital.  Phones are answered 24 hours a day 7 days a week 365 days a year.  Providers - Choose \"CONTINUE HOME MED (no script)\" at discharge if patient treatment with home infusion will continue.   Refills:  0    Resume prior to admission TPN/Lipids/saline       * [facility]   Used for:  Severe malnutrition (H)        Contact [facility] for patient specific medication information at [phone] on admission and discharge from the hospital.  Phones are answered 24 hours a day 7 days a week 365 days a year.  Providers - Choose \"CONTINUE HOME MED (no script)\" at discharge if patient treatment with home " infusion will continue.   Refills:  0    Continue home infusion       potassium bicarbonate 25 MEQ solu-tab   Commonly known as:  K-LYTE   Ask about: Should I take this medication?        Dose:  25 mEq   Take 1 tablet (25 mEq) by mouth 2 times daily for 2 days   Quantity:  4 tablet   Refills:  0        * Notice:  This list has 2 medication(s) that are the same as other medications prescribed for you. Read the directions carefully, and ask your doctor or other care provider to review them with you.      START taking        Dose / Directions Comments    diphenhydrAMINE 12.5 MG/5ML solution   Commonly known as:  BENADRYL   Used for:  Itching, Rash        Dose:  25 mg   Take 10 mLs (25 mg) by mouth 2 times daily   Quantity:  480 mL   Refills:  0        vancomycin 1,750 mg   Indication:  Bacteria in the Blood   Used for:  Infection of venous access port, initial encounter        Dose:  1750 mg   Inject 1,750 mg into the vein every 12 hours   Refills:  0          CONTINUE these medications which have NOT CHANGED        Dose / Directions Comments    albuterol 108 (90 BASE) MCG/ACT Inhaler   Commonly known as:  PROAIR HFA/PROVENTIL HFA/VENTOLIN HFA   Used for:  Aspiration pneumonitis (H)        Dose:  2 puff   Inhale 2 puffs into the lungs every 4 hours as needed for shortness of breath / dyspnea or wheezing   Quantity:  1 Inhaler   Refills:  3        * amphetamine-dextroamphetamine 20 MG per tablet   Commonly known as:  ADDERALL   Used for:  ADHD (attention deficit hyperactivity disorder), inattentive type        Dose:  20 mg   Take 1 tablet (20 mg) by mouth 2 times daily   Quantity:  60 tablet   Refills:  0        * amphetamine-dextroamphetamine 20 MG per tablet   Commonly known as:  ADDERALL   Used for:  ADHD (attention deficit hyperactivity disorder), inattentive type        Dose:  20 mg   Take 1 tablet (20 mg) by mouth 2 times daily   Quantity:  60 tablet   Refills:  0        * amphetamine-dextroamphetamine 20 MG per  tablet   Commonly known as:  ADDERALL   Used for:  ADHD (attention deficit hyperactivity disorder), inattentive type        Dose:  20 mg   Take 1 tablet (20 mg) by mouth 2 times daily   Quantity:  60 tablet   Refills:  0        cefTRIAXone 1 GM vial   Commonly known as:  ROCEPHIN   Used for:  Infection of venous access port, initial encounter        Dose:  1000 mg   Inject 1 g (1,000 mg) into the vein every 24 hours   Refills:  0    Last dose on 1/27       COREG PO        Dose:  12.5 mg   Take 12.5 mg by mouth 2 times daily   Refills:  0        * cyanocobalamin 1000 MCG/ML injection   Commonly known as:  VITAMIN B12        Dose:  1 mL   Inject 1 mL into the muscle every 30 days   Refills:  0        * cyanocobalamin 1000 MCG/ML injection   Commonly known as:  VITAMIN B12   Used for:  Bariatric surgery status        Dose:  1 mL   Inject 1 mL (1,000 mcg) into the muscle every 30 days   Quantity:  1 mL   Refills:  11        fentaNYL 50 mcg/hr 72 hr patch   Commonly known as:  DURAGESIC   Used for:  Chronic abdominal pain        Dose:  1 patch   Place 1 patch onto the skin every 48 hours MYLAN BRAND ONLY. Fill on/after 1/12/18 to start on/after 1/15/18   Quantity:  15 patch   Refills:  0        lidocaine 5 % Patch   Commonly known as:  LIDODERM   Used for:  Chronic bilateral low back pain without sciatica, Chronic abdominal pain, Myofascial pain        Dose:  1-2 patch   Place 1-2 patches onto the skin every 24 hours Wear for 12 hours, remove for 12 hours.  OK to cut to better fit to size.   Quantity:  60 patch   Refills:  6        lidocaine-prilocaine cream   Commonly known as:  EMLA   Used for:  Pain following surgery or procedure        Apply topically as needed for moderate pain   Quantity:  30 g   Refills:  11        morphine 0.1% in intrasite topical gel   Used for:  Pain following surgery or procedure        Apply as needed prior to accessing the port site.   Quantity:  100 g   Refills:  0        MULTI-VITAMINS  "Tabs        Dose:  1 tablet   Take 1 tablet by mouth   Refills:  0        mupirocin 2 % ointment   Commonly known as:  BACTROBAN   Used for:  Skin infection        Apply topically 3 times daily   Quantity:  30 g   Refills:  3        naloxone nasal spray   Commonly known as:  NARCAN   Used for:  Encounter for long-term opiate analgesic use, Chronic abdominal pain        Dose:  4 mg   Spray 1 spray (4 mg) into one nostril alternating nostrils as needed for opioid reversal (every 2-3 minutes until assistance arrives.)   Quantity:  0.2 mL   Refills:  0        Needle (Disp) 27G X 1/2\" Misc   Commonly known as:  BD DISP NEEDLES   Used for:  Bariatric surgery status        Dose:  1 Device   1 Device every 30 days Use for cyanocobalamin injection once q 30 days.   Quantity:  3 each   Refills:  4        nystatin-triamcinolone cream   Commonly known as:  MYCOLOG II   Used for:  Skin irritation        Apply topically 2 times daily as needed   Quantity:  60 g   Refills:  5        ondansetron 8 MG ODT tab   Commonly known as:  ZOFRAN-ODT   Used for:  Nausea        Dose:  8 mg   Take 1 tablet (8 mg) by mouth every 8 hours as needed for nausea   Quantity:  90 tablet   Refills:  3        * order for DME   Used for:  Bariatric surgery status        Injection Supplies for Vitamin B12: 3cc syringes w/ 27 gauge needles, 1/2 inch length   Quantity:  12 each   Refills:  0        * order for DME   Used for:  Bilateral edema of lower extremity        Equipment being ordered: Bilateral knee high chronic venous insufficiency stockings--  mild-moderate pressures.   Quantity:  4 each   Refills:  5        oxyCODONE 5 MG/5ML solution   Commonly known as:  ROXICODONE   Used for:  Encounter for long-term opiate analgesic use        Dose:  10-15 mg   Take 10-15 mLs (10-15 mg) by mouth every 4 hours as needed for moderate to severe pain Max of 45 mg per day. Fill on/after 1/12/18 not to start untill 1/15/18.   Quantity:  1350 mL   Refills:  0        " sucralfate 1 GM/10ML suspension   Commonly known as:  CARAFATE   Used for:  Nausea        Dose:  1 g   Take 10 mLs (1 g) by mouth 4 times daily   Quantity:  1200 mL   Refills:  11        vitamin D 55984 UNIT capsule   Commonly known as:  ERGOCALCIFEROL   Used for:  Vitamin D deficiency        Dose:  29489 Units   Take 1 capsule (50,000 Units) by mouth every 7 days   Quantity:  12 capsule   Refills:  3        * Notice:  This list has 7 medication(s) that are the same as other medications prescribed for you. Read the directions carefully, and ask your doctor or other care provider to review them with you.            After Care     IV access       **Ordering Provider MUST call/page Care Coordinator/ to discuss arranging this service**    You are going home with the following vascular access device: Cm.  [facility] will follow you for this line.  Please follow their recommendations and use gloves to access this line.             Referrals     Home infusion referral       Your provider has referred you to: [facility]: [facility] [place] [phone]   [email or web address]    Local Address (if different from home address): N/A    Anticipated Length of Therapy: Indefinite for the TPN  ---Continue Vancomycin and Ceftriaxone as ordered. Plan to complete a 14 day course of both antibiotics the follow up with PCP.  Esteban Daly   Home Infusion Pharmacist to adjust therapy based on labs and clinical assessments: Yes  [facility] Pharmacist to formulate TPN for home use.      Labs:  May draw labs from Venous Catheter: Yes  Home Infusion Pharmacist to order labs based on therapy type and clinical assessments: Yes  Call/Fax Lab Results to: Dr. Vyas with Bariatrics for TPN and Esteban Dukes for abx labs.     Agency Staff to assess nursing needs for Infusion Therapy.    Access Device Management:  IV Access Type:  Cm  Flush with Heparin and Normal Saline IVP PRN and routine site care (per agency protocol) to maintain access device? Yes     Associated Diagnoses     Gastric bypass status for obesity [Z98.84]  - Primary    Infection of venous access port, initial encounter [T80.219A]    Malnutrition, unspecified type (H) [E46]             Your next 10 appointments already scheduled     Apr 04, 2018  1:30 PM CDT   Return Visit with Patti Milligan MD   [facility] ([facility])    [address] [address] [address]   [phone]              Statement of Approval     Ordered          01/17/18 1113  I have reviewed and agree with all the recommendations and orders detailed in this document.  EFFECTIVE NOW     Approved and electronically signed by:  Koko Dozier PA-C

## 2018-01-13 PROBLEM — R78.81 E COLI BACTEREMIA: Status: ACTIVE | Noted: 2018-01-13

## 2018-01-13 PROBLEM — B96.20 E COLI BACTEREMIA: Status: ACTIVE | Noted: 2018-01-13

## 2018-01-13 LAB
ALBUMIN SERPL-MCNC: 3.3 G/DL (ref 3.4–5)
ALP SERPL-CCNC: 91 U/L (ref 40–150)
ALT SERPL W P-5'-P-CCNC: 55 U/L (ref 0–70)
ANION GAP SERPL CALCULATED.3IONS-SCNC: 7 MMOL/L (ref 3–14)
ANION GAP SERPL CALCULATED.3IONS-SCNC: 8 MMOL/L (ref 3–14)
AST SERPL W P-5'-P-CCNC: 24 U/L (ref 0–45)
BILIRUB SERPL-MCNC: 0.5 MG/DL (ref 0.2–1.3)
BUN SERPL-MCNC: 17 MG/DL (ref 7–30)
BUN SERPL-MCNC: 21 MG/DL (ref 7–30)
CALCIUM SERPL-MCNC: 7.9 MG/DL (ref 8.5–10.1)
CALCIUM SERPL-MCNC: 8 MG/DL (ref 8.5–10.1)
CHLORIDE SERPL-SCNC: 108 MMOL/L (ref 94–109)
CHLORIDE SERPL-SCNC: 110 MMOL/L (ref 94–109)
CO2 SERPL-SCNC: 25 MMOL/L (ref 20–32)
CO2 SERPL-SCNC: 25 MMOL/L (ref 20–32)
CREAT SERPL-MCNC: 0.84 MG/DL (ref 0.66–1.25)
CREAT SERPL-MCNC: 1.05 MG/DL (ref 0.66–1.25)
CRP SERPL-MCNC: 110 MG/L (ref 0–8)
CRP SERPL-MCNC: 110 MG/L (ref 0–8)
ERYTHROCYTE [DISTWIDTH] IN BLOOD BY AUTOMATED COUNT: 14.5 % (ref 10–15)
GFR SERPL CREATININE-BSD FRML MDRD: 76 ML/MIN/1.7M2
GFR SERPL CREATININE-BSD FRML MDRD: >90 ML/MIN/1.7M2
GLUCOSE SERPL-MCNC: 78 MG/DL (ref 70–99)
GLUCOSE SERPL-MCNC: 88 MG/DL (ref 70–99)
HCT VFR BLD AUTO: 39.1 % (ref 40–53)
HGB BLD-MCNC: 12.7 G/DL (ref 13.3–17.7)
INR PPP: 1.21 (ref 0.86–1.14)
MAGNESIUM SERPL-MCNC: 2 MG/DL (ref 1.6–2.3)
MCH RBC QN AUTO: 31.5 PG (ref 26.5–33)
MCHC RBC AUTO-ENTMCNC: 32.5 G/DL (ref 31.5–36.5)
MCV RBC AUTO: 97 FL (ref 78–100)
PHOSPHATE SERPL-MCNC: 3.2 MG/DL (ref 2.5–4.5)
PLATELET # BLD AUTO: 79 10E9/L (ref 150–450)
POTASSIUM SERPL-SCNC: 3 MMOL/L (ref 3.4–5.3)
POTASSIUM SERPL-SCNC: 3.2 MMOL/L (ref 3.4–5.3)
PROT SERPL-MCNC: 6.6 G/DL (ref 6.8–8.8)
RBC # BLD AUTO: 4.03 10E12/L (ref 4.4–5.9)
SODIUM SERPL-SCNC: 140 MMOL/L (ref 133–144)
SODIUM SERPL-SCNC: 143 MMOL/L (ref 133–144)
WBC # BLD AUTO: 11.3 10E9/L (ref 4–11)

## 2018-01-13 PROCEDURE — 87040 BLOOD CULTURE FOR BACTERIA: CPT | Performed by: PHYSICIAN ASSISTANT

## 2018-01-13 PROCEDURE — 25000128 H RX IP 250 OP 636: Performed by: INTERNAL MEDICINE

## 2018-01-13 PROCEDURE — 85027 COMPLETE CBC AUTOMATED: CPT | Performed by: INTERNAL MEDICINE

## 2018-01-13 PROCEDURE — 96375 TX/PRO/DX INJ NEW DRUG ADDON: CPT | Performed by: EMERGENCY MEDICINE

## 2018-01-13 PROCEDURE — 87077 CULTURE AEROBIC IDENTIFY: CPT | Performed by: PHYSICIAN ASSISTANT

## 2018-01-13 PROCEDURE — 83735 ASSAY OF MAGNESIUM: CPT | Performed by: INTERNAL MEDICINE

## 2018-01-13 PROCEDURE — 25000128 H RX IP 250 OP 636: Performed by: EMERGENCY MEDICINE

## 2018-01-13 PROCEDURE — 84100 ASSAY OF PHOSPHORUS: CPT | Performed by: INTERNAL MEDICINE

## 2018-01-13 PROCEDURE — 87186 SC STD MICRODIL/AGAR DIL: CPT | Performed by: PHYSICIAN ASSISTANT

## 2018-01-13 PROCEDURE — 36592 COLLECT BLOOD FROM PICC: CPT | Performed by: INTERNAL MEDICINE

## 2018-01-13 PROCEDURE — 99223 1ST HOSP IP/OBS HIGH 75: CPT | Mod: AI | Performed by: INTERNAL MEDICINE

## 2018-01-13 PROCEDURE — 25000132 ZZH RX MED GY IP 250 OP 250 PS 637: Performed by: INTERNAL MEDICINE

## 2018-01-13 PROCEDURE — 25000132 ZZH RX MED GY IP 250 OP 250 PS 637: Performed by: PHYSICIAN ASSISTANT

## 2018-01-13 PROCEDURE — 36415 COLL VENOUS BLD VENIPUNCTURE: CPT | Performed by: PHYSICIAN ASSISTANT

## 2018-01-13 PROCEDURE — 80048 BASIC METABOLIC PNL TOTAL CA: CPT | Performed by: INTERNAL MEDICINE

## 2018-01-13 PROCEDURE — 86140 C-REACTIVE PROTEIN: CPT | Performed by: INTERNAL MEDICINE

## 2018-01-13 PROCEDURE — 12000008 ZZH R&B INTERMEDIATE UMMC

## 2018-01-13 PROCEDURE — 99207 ZZC APP CREDIT; MD BILLING SHARED VISIT: CPT | Performed by: PHYSICIAN ASSISTANT

## 2018-01-13 RX ORDER — NYSTATIN 100000 U/G
CREAM TOPICAL 2 TIMES DAILY PRN
Status: DISCONTINUED | OUTPATIENT
Start: 2018-01-13 | End: 2018-01-13

## 2018-01-13 RX ORDER — DIPHENHYDRAMINE HCL 12.5MG/5ML
25-50 LIQUID (ML) ORAL EVERY 6 HOURS PRN
Status: DISCONTINUED | OUTPATIENT
Start: 2018-01-13 | End: 2018-01-13

## 2018-01-13 RX ORDER — FENTANYL 50 UG/1
50 PATCH TRANSDERMAL
Status: DISCONTINUED | OUTPATIENT
Start: 2018-01-14 | End: 2018-01-17 | Stop reason: HOSPADM

## 2018-01-13 RX ORDER — ONDANSETRON 2 MG/ML
4 INJECTION INTRAMUSCULAR; INTRAVENOUS EVERY 6 HOURS PRN
Status: DISCONTINUED | OUTPATIENT
Start: 2018-01-13 | End: 2018-01-17 | Stop reason: HOSPADM

## 2018-01-13 RX ORDER — ONDANSETRON 4 MG/1
4 TABLET, ORALLY DISINTEGRATING ORAL EVERY 6 HOURS PRN
Status: DISCONTINUED | OUTPATIENT
Start: 2018-01-13 | End: 2018-01-17 | Stop reason: HOSPADM

## 2018-01-13 RX ORDER — LIDOCAINE 4 G/G
1-2 PATCH TOPICAL EVERY 24 HOURS
Status: DISCONTINUED | OUTPATIENT
Start: 2018-01-13 | End: 2018-01-17 | Stop reason: HOSPADM

## 2018-01-13 RX ORDER — OXYCODONE HCL 5 MG/5 ML
10-15 SOLUTION, ORAL ORAL EVERY 4 HOURS PRN
Status: DISCONTINUED | OUTPATIENT
Start: 2018-01-13 | End: 2018-01-17 | Stop reason: HOSPADM

## 2018-01-13 RX ORDER — DEXTROAMPHETAMINE SACCHARATE, AMPHETAMINE ASPARTATE, DEXTROAMPHETAMINE SULFATE AND AMPHETAMINE SULFATE 1.25; 1.25; 1.25; 1.25 MG/1; MG/1; MG/1; MG/1
20 TABLET ORAL 2 TIMES DAILY
Status: DISCONTINUED | OUTPATIENT
Start: 2018-01-13 | End: 2018-01-13

## 2018-01-13 RX ORDER — DIPHENHYDRAMINE HCL 12.5MG/5ML
25 LIQUID (ML) ORAL 2 TIMES DAILY
Status: DISCONTINUED | OUTPATIENT
Start: 2018-01-14 | End: 2018-01-17 | Stop reason: HOSPADM

## 2018-01-13 RX ORDER — SODIUM CHLORIDE 9 MG/ML
INJECTION, SOLUTION INTRAVENOUS CONTINUOUS
Status: ACTIVE | OUTPATIENT
Start: 2018-01-13 | End: 2018-01-13

## 2018-01-13 RX ORDER — ALBUTEROL SULFATE 90 UG/1
2 AEROSOL, METERED RESPIRATORY (INHALATION) EVERY 4 HOURS PRN
Status: DISCONTINUED | OUTPATIENT
Start: 2018-01-13 | End: 2018-01-17 | Stop reason: HOSPADM

## 2018-01-13 RX ORDER — MUPIROCIN 20 MG/G
OINTMENT TOPICAL 3 TIMES DAILY
Status: DISCONTINUED | OUTPATIENT
Start: 2018-01-13 | End: 2018-01-17 | Stop reason: HOSPADM

## 2018-01-13 RX ORDER — NYSTATIN 100000 U/G
CREAM TOPICAL 2 TIMES DAILY
Status: DISCONTINUED | OUTPATIENT
Start: 2018-01-13 | End: 2018-01-17 | Stop reason: HOSPADM

## 2018-01-13 RX ORDER — POTASSIUM CL/LIDO/0.9 % NACL 10MEQ/0.1L
10 INTRAVENOUS SOLUTION, PIGGYBACK (ML) INTRAVENOUS
Status: DISCONTINUED | OUTPATIENT
Start: 2018-01-13 | End: 2018-01-17 | Stop reason: HOSPADM

## 2018-01-13 RX ORDER — TRIAMCINOLONE ACETONIDE 1 MG/G
CREAM TOPICAL 2 TIMES DAILY PRN
Status: DISCONTINUED | OUTPATIENT
Start: 2018-01-13 | End: 2018-01-13

## 2018-01-13 RX ORDER — NYSTATIN AND TRIAMCINOLONE ACETONIDE 100000; 1 [USP'U]/G; MG/G
CREAM TOPICAL 2 TIMES DAILY PRN
Status: DISCONTINUED | OUTPATIENT
Start: 2018-01-13 | End: 2018-01-13 | Stop reason: RX

## 2018-01-13 RX ORDER — POTASSIUM CHLORIDE 1.5 G/1.58G
20-40 POWDER, FOR SOLUTION ORAL
Status: DISCONTINUED | OUTPATIENT
Start: 2018-01-13 | End: 2018-01-17 | Stop reason: HOSPADM

## 2018-01-13 RX ORDER — NALOXONE HYDROCHLORIDE 0.4 MG/ML
.1-.4 INJECTION, SOLUTION INTRAMUSCULAR; INTRAVENOUS; SUBCUTANEOUS
Status: DISCONTINUED | OUTPATIENT
Start: 2018-01-13 | End: 2018-01-17 | Stop reason: HOSPADM

## 2018-01-13 RX ORDER — DEXTROAMPHETAMINE SACCHARATE, AMPHETAMINE ASPARTATE, DEXTROAMPHETAMINE SULFATE AND AMPHETAMINE SULFATE 2.5; 2.5; 2.5; 2.5 MG/1; MG/1; MG/1; MG/1
20 TABLET ORAL 2 TIMES DAILY
Status: DISCONTINUED | OUTPATIENT
Start: 2018-01-13 | End: 2018-01-17 | Stop reason: HOSPADM

## 2018-01-13 RX ORDER — POTASSIUM CHLORIDE 29.8 MG/ML
20 INJECTION INTRAVENOUS
Status: DISCONTINUED | OUTPATIENT
Start: 2018-01-13 | End: 2018-01-17 | Stop reason: HOSPADM

## 2018-01-13 RX ORDER — CARVEDILOL 12.5 MG/1
12.5 TABLET ORAL 2 TIMES DAILY
Status: DISCONTINUED | OUTPATIENT
Start: 2018-01-13 | End: 2018-01-13

## 2018-01-13 RX ORDER — POTASSIUM CHLORIDE 750 MG/1
20-40 TABLET, EXTENDED RELEASE ORAL
Status: DISCONTINUED | OUTPATIENT
Start: 2018-01-13 | End: 2018-01-17 | Stop reason: HOSPADM

## 2018-01-13 RX ORDER — TRIAMCINOLONE ACETONIDE 1 MG/G
CREAM TOPICAL 2 TIMES DAILY
Status: DISCONTINUED | OUTPATIENT
Start: 2018-01-13 | End: 2018-01-17 | Stop reason: HOSPADM

## 2018-01-13 RX ORDER — POTASSIUM CHLORIDE 7.45 MG/ML
10 INJECTION INTRAVENOUS
Status: DISCONTINUED | OUTPATIENT
Start: 2018-01-13 | End: 2018-01-13 | Stop reason: RX

## 2018-01-13 RX ORDER — MULTIPLE VITAMINS W/ MINERALS TAB 9MG-400MCG
1 TAB ORAL DAILY
Status: DISCONTINUED | OUTPATIENT
Start: 2018-01-13 | End: 2018-01-13

## 2018-01-13 RX ORDER — SUCRALFATE ORAL 1 G/10ML
1 SUSPENSION ORAL 4 TIMES DAILY
Status: DISCONTINUED | OUTPATIENT
Start: 2018-01-13 | End: 2018-01-17 | Stop reason: HOSPADM

## 2018-01-13 RX ORDER — DIPHENHYDRAMINE HYDROCHLORIDE 50 MG/ML
50 INJECTION INTRAMUSCULAR; INTRAVENOUS ONCE
Status: COMPLETED | OUTPATIENT
Start: 2018-01-13 | End: 2018-01-13

## 2018-01-13 RX ADMIN — OXYCODONE HYDROCHLORIDE 10 MG: 5 SOLUTION ORAL at 22:55

## 2018-01-13 RX ADMIN — DIPHENHYDRAMINE HYDROCHLORIDE 50 MG: 50 INJECTION, SOLUTION INTRAMUSCULAR; INTRAVENOUS at 01:24

## 2018-01-13 RX ADMIN — OXYCODONE HYDROCHLORIDE 10 MG: 5 SOLUTION ORAL at 12:48

## 2018-01-13 RX ADMIN — DIPHENHYDRAMINE HYDROCHLORIDE 50 MG: 25 SOLUTION ORAL at 14:06

## 2018-01-13 RX ADMIN — CARVEDILOL 12.5 MG: 25 TABLET, FILM COATED ORAL at 12:31

## 2018-01-13 RX ADMIN — DEXTROAMPHETAMINE SACCHARATE, AMPHETAMINE ASPARTATE, DEXTROAMPHETAMINE SULFATE AND AMPHETAMINE SULFATE 20 MG: 2.5; 2.5; 2.5; 2.5 TABLET ORAL at 20:44

## 2018-01-13 RX ADMIN — SODIUM CHLORIDE 1000 ML: 9 INJECTION, SOLUTION INTRAVENOUS at 03:42

## 2018-01-13 RX ADMIN — SODIUM CHLORIDE: 9 INJECTION, SOLUTION INTRAVENOUS at 05:46

## 2018-01-13 RX ADMIN — CARVEDILOL 12.5 MG: 25 TABLET, FILM COATED ORAL at 20:43

## 2018-01-13 RX ADMIN — VANCOMYCIN HYDROCHLORIDE 1750 MG: 10 INJECTION, POWDER, LYOPHILIZED, FOR SOLUTION INTRAVENOUS at 15:25

## 2018-01-13 RX ADMIN — MULTIVITAMIN 15 ML: LIQUID ORAL at 12:30

## 2018-01-13 ASSESSMENT — PAIN DESCRIPTION - DESCRIPTORS
DESCRIPTORS: ACHING
DESCRIPTORS: OTHER (COMMENT)

## 2018-01-13 ASSESSMENT — ENCOUNTER SYMPTOMS
CONFUSION: 0
NECK STIFFNESS: 0
ARTHRALGIAS: 0
HEADACHES: 0
SHORTNESS OF BREATH: 0
COLOR CHANGE: 0
EYE REDNESS: 0
ABDOMINAL PAIN: 0
DIFFICULTY URINATING: 0

## 2018-01-13 NOTE — PROGRESS NOTES
[facility], [place]    Internal Medicine Progress Note - Gold Service      Assessment & Plan   Parker Acevedo Sr is a 47 year old male admitted on 1/12/2018. He has a history of multiple abdominal surgeries (RnYGB, lap corey, gastrojejunostomy, gastrectomy, appendectomy) c/b chronic abdominal pain, severe malnutrition, and short gut syndrome now on chronic TPN via port and is admitted for E. Coli Bacteremia.     E. Coli & E. Faecalis Bacteremia likely CLABSI. History of recurrent Port-a-Cath infections most recently exchanged 12/19/17. Previous blood cx positive for strep salivarius and strep mitis (9/21/17), currently blood cx w/ E. Coli (pansensitive) and E. Faecalis. On Ceftriaxone 1/12/18-present. Afebrile w/ leukocytosis on admission. CRP elevated. Not septic appearing. ID consulted, appreciate recs  - Continue IV Ceftriaxone q24hrs  - Start Vancomycin per Pharmacy dosing: Give over 2hrs and pre-medicate w/ PO benadryl prior. No indication for IV benadryl  - IR consult if needs port replaced  - Trend CBC, CRP    Chronic Pain Syndrome. Reports lower back pain, abdominal pain. Follows in pain clinic w/ Dr. Milligan - last seen 1/3/18 with recs for following regimen:   - Continue PTA fentanyl, oxycodone 10-15mg q4hrs.   - Would strongly discourage further opioids or benzos    JASON, resolved. Baseline Cr 0.7-0.8. Cr on admission 1.05 -->0.84 s/p IVF. Pt is IVF dependent at home, but reports has not used IVF in past week because he was busy moving. Trend BMP     Severe Malnutrition on Chronic TPN. Due to chronic GI issues (s/p RnYGB, lap corey, gastrojejunostomy, gastrectomy, appendectomy) and short gut syndrome. Has a G tube but only uses to vent. Takes pills PO only if they dissolve or are liquid.  - Nutrition consulted   - Holding TPN until we have appropriate access  - Continue G tube to vent  - NPO    Thrombocytopenia. Previously wnl, currently 76. Has not received Heparin this  "admission. Possibly due to bacteremia. No s/s bleeding.  - Trend Plts    Hypokalemia. Pt refused PO replacement and IV \"hurts\". Encouraged IV replacement. Recheck in AM.    Yeast Infection surrounding G tube. Start BID mycostatin w/ kenalog cream.    Chronic, Stable Issues.  History of possible cardiomyopathy w/ recovered EF. Lowest EF ~45%, last 55% (9/2017). No signs of sepsis. Continue carvedilol 12.5mg BID  COPD/Asthma. Continue PTA inhaler  GERD/PUD. Continue PTA sucralfate  ADHD. Continue PTA adderall    Diet: NPO for Medical/Clinical Reasons Except for: Meds, Ice Chips; TPN per home regimen  Fluids: None  DVT Prophylaxis: Pneumatic Compression Devices  Code Status: Full Code    Disposition Plan   Expected discharge: 2 - 3 days, recommended to prior living arrangement once antibiotic plan established.     Entered: Rene Marcano 01/13/2018, 7:37 AM   Information in the above section will display in the discharge planner report.      The patient's care was discussed with the Attending Physician, Dr. Willett and ID consultant.    Rene Marcano  Internal Medicine Staff Hospitalist Service  [facility]  Pager: [phone]  Please see sticky note for cross cover information    Interval History   Patient reports feeling fine, just some chronic low back pain is bothering him. He recently moved and has been very busy so has not been able to use IVF in about 1 week. No BM in 1 week, which is normal for him. He is passing gas. He vents his G tube. Does not tolerate anything by mouth unless it dissolves or is liquid. He denies chest pain, abdominal pain, urinary symptoms, diarrhea.    Data reviewed today: I reviewed all medications, new labs and imaging results over the last 24 hours. I personally reviewed no images or EKG's today.    Physical Exam   Vital Signs: Temp: 97.1  F (36.2  C) Temp src: Oral BP: 102/60 Pulse: 66   Resp: 18 SpO2: 98 % O2 Device: None (Room air)    Weight: 186 lbs 0 " oz  General Appearance: A&Ox3. NAD. Occasionally sleepy during our conversation.  Respiratory: Normal effort on RA. Lungs CTAB. Diminished BS in bases.  Cardiovascular: RRR. S1, S2. No murmurs. Port in R upper chest.  GI: Abdomen soft, non-tender, non-distended.   Skin: G tube site w/ surrounding erythema and satellite lesions. Excess skin on abdomen.  Other: Trace peripheral edema.      Data   Data     Recent Labs  Lab 01/12/18  2323 01/12/18  0005   WBC 12.4* 3.6*   HGB 12.8* 12.3*   MCV 95 95   PLT 76* 70*   INR 1.21*  --     141   POTASSIUM 3.0* 2.8*   CHLORIDE 108 110*   CO2 25 22   BUN 21 11   CR 1.05 0.77   ANIONGAP 7 9   SILAS 7.9* 7.5*   GLC 88 94   ALBUMIN 3.3* 3.1*   PROTTOTAL 6.6* 6.1*   BILITOTAL 0.5 0.7   ALKPHOS 91 112   ALT 55 50   AST 24 27     No results found for this or any previous visit (from the past 24 hour(s)).

## 2018-01-13 NOTE — ED NOTES
Pt was seen yesterday for severe back pain, fever. Was called tonight saying that he has E. Coli and to come back to ER to be seen.

## 2018-01-13 NOTE — PHARMACY-VANCOMYCIN DOSING SERVICE
Pharmacy Vancomycin Initial Note  Date of Service 2018  Patient's  1970  47 year old, male    Indication: Bacteremia    Current estimated CrCl = Estimated Creatinine Clearance: 132.6 mL/min (based on Cr of 0.84).    Creatinine for last 3 days  2018: 12:05 AM Creatinine 0.77 mg/dL; 11:23 PM Creatinine 1.05 mg/dL  2018:  9:23 AM Creatinine 0.84 mg/dL    Recent Vancomycin Level(s) for last 3 days  No results found for requested labs within last 72 hours.      Vancomycin IV Administrations (past 72 hours)      No vancomycin orders with administrations in past 72 hours.                Nephrotoxins and other renal medications (Future)    Start     Dose/Rate Route Frequency Ordered Stop    18 1400  vancomycin (VANCOCIN) 1,750 mg in NaCl 0.9 % 500 mL intermittent infusion      1,750 mg  over 2 Hours Intravenous EVERY 12 HOURS 18 1335            Contrast Orders - past 72 hours     None                Plan:  1.  Start vancomycin  1750 mg IV q12h.   2.  Goal Trough Level: 15-20 mg/L   3.  Pharmacy will check trough levels as appropriate in 1-3 Days.    4.  Serum creatinine levels will be ordered daily for the first week of therapy and at least twice weekly for subsequent weeks.    5.  Joshua Tree method utilized to dose vancomycin therapy: Method 2      Gabe Peterson, PharmD, [facility]  2018

## 2018-01-13 NOTE — PLAN OF CARE
Patient admitted to 7B from ED around 0245 after being contacted regarding positive blood cultures that were drawn in ED Thursday night. Patient initially presented with chills, rigors, malaise and back pains on Thursday night after starting TPN via Power Port (placed 12/2017). Port currently accessed, but not to be used. Patient has been on chronic TPN d/t issues following multiple abdominal surgeries. Currently experiences abdominal pain to LUQ, but denies any intervention as it is tolerable. Patient has Fentanyl patch on R deltoid, placed 1/12. G tube used to vent, has some redness but unchanged from baseline - topical creams ordered for this. Patient needs potassium replacement, but refusing to have replacement via PIV d/t past experiences of burning. R) PIV with NS at 125cc/hr; 1L bolus given prior. Patient has not voided yet. Ambulates independently in hallway. Spouse/SO primary caregiver at bedside upon arrival. Further testing and decisions ordered for today.

## 2018-01-13 NOTE — CONSULTS
[facility] ID SERVICE: NEW CONSULTATION        Patient:  Parker Acevedo , Date of birth 1970, Medical record number [id number]  Date of Visit:  01/13/2018  Consult Requested by: Patti Willett*   Admission Diagnosis: Infection of venous access port, initial encounter [T80.219A]  Consult Question: ? Port infection / bacteremia         Assessment and Recommendations:     Impression:  1. CLABSI due to infected infusion port (thus far, with E coli)  2. E coli bacteremia  3. Enterococcus faecalis bacteremia  4. Recurrent infections of infusion port (most recently with oral Streptococcus spp.)  5. 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 Malnutrition, with possible nutritional cardiomyopathy  7. Chronic abdominal pain, on longstanding opioids  8. Multiple antibiotic allergies (Sulfa, Penicillins, Doxycycline)    Recommendations:  1. Continue Vancomycin and Ceftriaxone as ordered  1. Infusion modifications/premedications may need to be done, given prior Red Man Syndrome reaction with Vancomycin  2. Would recommend prompt removal of the right upper chest port  3. Suggest changing out the PEG tube apparatus given the longevity and possible local irritation (? Folliculitis)    Discussion:  47 year old man with port-dependent TPN and multiple prior port infections. Avenue of recurrent infections with his port is unclear, however some possibility that they could be contaminated, either by lack of altogether fastidious technique (which seems doubtful given he and his wife's description of the processes and safeguards) or seeding from elsewhere (perceived as less likely).     Of note, however, this port has been accessed, per patient, for the past several day. Furthermore, his G-tube apparatus is apparently overdue, per the patient, for maintenance. Irritation is evident at the site, but is not wholly consistent with a cellulitis/abscess such that we would  "readily expect it to be the source in this case.     Nonetheless, presence of Gram negative organisms as drawn from the port, in combination with an Enterococcus spp, should prompt removal and eventual replacement of the device once clear of any bacteremias. His current coverage is adequate for organisms thus far identified.     Thank you for this consult. Will continue to follow. Please call or page with any questions.    Discussed with Staff, Dr. Adia Lockhart DO, MPH  Fellow Physician, Infectious Disease and Medical Microbiology  Dept of Infectious Diseases, [facility]  Pg [phone]    ID Staff Assessment and Plan:   Patient was seen and examined by me with the ID Fellow. The note above reflects our joint assessment and recommendations. I have reviewed the medical record, labs, imaging, vitals signs and have discussed the patient with the ID fellow in detail.   Kayy Cheek MD  ID staff physician  Pager [phone]         History of Present Illness:     History obtained by: patient, his wife    48 YO man with longstanding reliance on TPN as a consequence of short gut syndrome following KEYA gastric bypass is admitted to the hospital after being called back when a port culture showed growth of E coli. Since that time, he has also had evidence of an Enterococcus in his blood via Verigene. ID is asked to evaluate in this context. He had been started on Ceftriaxone overnight, per my suggestion, after being called by the ED once his E coli was positive by Verigene (CTX-M negative).     I met the patient and his wife in his room on 7B. He is in good humor, but irritated by the lack of longevity of this port, noting that \"it's only been a month!\". Port had been fitted 12/19/2017; last infection had been in 9/2017, attributable to various oral Streptococci, and followed by ID at that time as well.     He and his wife have recently been moving to new housing, and he has been somewhat inconsistent with his TPN use, and " "moreover was using a peripheral IV line for a time for TPN feedings. When accessing the port, he noticed immediately afterwards that he began to feel poorly, characterized as fevers, chills, shaking, and awful back pain such that he encouraged his wife to call 911. Pain described as low in the back, \"on the inside\", non-radiating, central, and very sharp, not particularly alleviated by anything. This has since resolved. Despite being sent home from the ED, he reports to me that he was confident, based on prior experiences, that this constellation of experiences represented yet another port infection, as it occurred again on the second day.     He had not noted any preceding redness, pain, rash, tenderness, or irritation at the port site. I am shown images on his wife's phone of prior port irritation, and none of these were seen in recent weeks.     Today he denies significant fever, chills, shaking, sweats, nausea, vomiting, chest pain, dyspnea, abdominal pain, back pain, joint pain, or URI symptoms. Both he and his wife describe the process of port access, noting that they have an entire room now devoted to this, with meticulous application of sterile process. His G tube, per report, is overdue for replacement, but since he only uses it for venting and did not want to drive ~2.5 hours for a separate appointment, he has not yet had this done. Occasional surrounding skin irritation managed by topical cream.     Recent culture results include:  Culture Micro   Date Value Ref Range Status   01/12/2018 (A)  Preliminary    Cultured on the 1st day of incubation:  Gram negative rods     01/12/2018 (A)  Preliminary    Cultured on the 1st day of incubation:  Gram positive cocci in pairs and chains     01/12/2018   Preliminary    Critical Value/Significant Value, preliminary result only, called to and read back by  Shorty Gardner RN 7B @ 0836.cg 01/13/18 01/12/2018 No growth after 8 hours  Preliminary   01/12/2018 No growth " after 1 day  Preliminary   01/12/2018 (A)  Preliminary    Cultured on the 1st day of incubation:  Escherichia coli     01/12/2018   Preliminary    Critical Value/Significant Value, preliminary result only, called to and read back by  Dr Jeet Orozco  in the [facility] at 8:20am 1/12/2018 ([facility])     01/12/2018   Preliminary    (Note)  POSITIVE for E.COLI by Verigene multiplex nucleic acid test. Final  identification and antimicrobial susceptibility testing will be  verified by standard methods. Verigene test will not distinguish  E.coli from Shigella species including S.dysenteriae, S.flexneri,  S.boydii, and S.sonnei. Specimens containing Shigella species or  E.coli will be reported as Positive for E.coli.    Specimen tested with Verigene multiplex, gram-negative blood culture  nucleic acid test for the following targets: Acinetobacter sp.,  Citrobacter sp., Enterobacter sp., Proteus sp., E. coli, K.  pneumoniae/oxytoca, P. aeruginosa, and the following resistance  markers: CTXM, KPC, NDM, VIM, IMP and OXA.    Critical Value/Significant Value called to and read back by Dr. Jeet Orozco at 1042 on 1.12.18.KD     09/24/2017 No growth  Final   09/24/2017 No growth  Final            Current Medications (antimicrobials listed in bold):       [START ON 1/14/2018] fentaNYL  50 mcg Transdermal Q48H     Lidocaine  1-2 patch Transdermal Q24H     mupirocin   Topical TID     sucralfate  1 g Oral 4x Daily     cefTRIAXone  1 g Intravenous Q24H     amphetamine-dextroamphetamine  20 mg Oral BID     [START ON 1/14/2018] fentaNYL   Transdermal Q72H     fentaNYL   Transdermal Q8H     lidocaine   Transdermal Q24H     lidocaine   Transdermal Q8H     carvedilol  12.5 mg Oral BID     multivitamins with minerals  15 mL Oral Daily     nystatin   Topical BID     triamcinolone   Topical BID            Review of Systems:     ROS as above in HPI; all other systems, complete 10 point ROS queried, reviewed, and negative.        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)      Chronic abdominal pain      Difficulty swallowing      Gastric ulcer, unspecified as acute or chronic, without mention of hemorrhage, perforation, or obstruction      Gastro-oesophageal reflux disease      Head injury      Hiatal hernia      Other bladder disorder      Other chronic pain      Severe malnutrition (H)     TPN     Short gut syndrome      Tobacco abuse           Past Surgical History:     Past Surgical History:   Procedure Laterality Date     APPENDECTOMY       BACK SURGERY  11/3/2014    curve in the spine     BIOPSY LYMPH NODE CERVICAL N/A 2/20/2015    Procedure: BIOPSY LYMPH NODE CERVICAL;  Surgeon: Baron Scanlon MD;  Location: [facility]     C GASTRIC BYPASS,OBESE<100CM SHAYLEE-EN-Y  2002    lost 300 pounds     CHOLECYSTECTOMY       DISCECTOMY, FUSION CERVICAL ANTERIOR ONE LEVEL, COMBINED N/A 2/15/2017    Procedure: COMBINED DISCECTOMY, FUSION CERVICAL ANTERIOR ONE LEVEL;  Surgeon: Darren Campos MD;  Location: [facility]     ENDOSCOPIC INSERTION TUBE GASTROSTOMY  9/9/2013    Procedure: ENDOSCOPIC INSERTION TUBE GASTROSTOMY;;  Surgeon: Francis Vyas MD;  [facility]     ENDOSCOPIC ULTRASOUND UPPER GASTROINTESTINAL TRACT (GI)  4/29/2011    Procedure:ENDOSCOPIC ULTRASOUND UPPER GASTROINTESTINAL TRACT (GI); Both Procedures done Conjointly; Surgeon:NEREIDA HOUSER; [facility]     ENDOSCOPIC ULTRASOUND UPPER GASTROINTESTINAL TRACT (GI)  9/9/2013    Procedure: ENDOSCOPIC ULTRASOUND UPPER GASTROINTESTINAL TRACT (GI);  Endoscopic Ultrasound Guide Gastrostomy Tube Placement  C-arm;  Surgeon: Noe Lizarraga MD;  [facility]     ENDOSCOPY  03/25/11    [facility] [place] [facility]     ENDOSCOPY  08/04/09    [facility] [place]  [facility]     ENDOSCOPY  01/05/09    [facility] [place] [facility]     ESOPHAGOSCOPY, GASTROSCOPY, DUODENOSCOPY (EGD), COMBINED  4/20/2011    Procedure:COMBINED ESOPHAGOSCOPY, GASTROSCOPY, DUODENOSCOPY (EGD); Surgeon:BLU VYAS; [facility]     ESOPHAGOSCOPY, GASTROSCOPY, DUODENOSCOPY (EGD), COMBINED  6/15/2011    Procedure:COMBINED ESOPHAGOSCOPY, GASTROSCOPY, DUODENOSCOPY (EGD); Surgeon:BLU VYAS; [facility]     ESOPHAGOSCOPY, GASTROSCOPY, DUODENOSCOPY (EGD), COMBINED  6/12/2013    Procedure: COMBINED ESOPHAGOSCOPY, GASTROSCOPY, DUODENOSCOPY (EGD);;  Surgeon: Blu Vyas MD;  Location:  GI     ESOPHAGOSCOPY, GASTROSCOPY, DUODENOSCOPY (EGD), COMBINED  11/22/2013    Procedure: COMBINED ESOPHAGOSCOPY, GASTROSCOPY, DUODENOSCOPY (EGD);;  Surgeon: Blu Vyas MD;  [facility]     ESOPHAGOSCOPY, GASTROSCOPY, DUODENOSCOPY (EGD), COMBINED  4/30/2014    Procedure: COMBINED ESOPHAGOSCOPY, GASTROSCOPY, DUODENOSCOPY (EGD);  Surgeon: Blu Vyas MD;  Location:  GI     ESOPHAGOSCOPY, GASTROSCOPY, DUODENOSCOPY (EGD), COMBINED N/A 2/20/2015    Procedure: COMBINED ESOPHAGOSCOPY, GASTROSCOPY, DUODENOSCOPY (EGD), BIOPSY SINGLE OR MULTIPLE;  Surgeon: Baron Scanlon MD;  Location:  OR     ESOPHAGOSCOPY, GASTROSCOPY, DUODENOSCOPY (EGD), COMBINED N/A 9/30/2015    Procedure: COMBINED ESOPHAGOSCOPY, GASTROSCOPY, DUODENOSCOPY (EGD);  Surgeon: Blu Vyas MD;  [facility]     GASTRECTOMY  6/22/2012    Procedure: GASTRECTOMY;  Open Approach, Excise Ulcers,Partial Gastrectomy, Esophagojejunostomy, Hiatal Hernia Repair, Extensive Lysis of Adhesions and Esaphagogastrodudenoscopy.;  Surgeon: Blu Vyas MD;  [facility]     GASTROJEJUNOSTOMY  08/26/09    Extensice enterolysis, partial resect. jejunum, part. resect gastric pouch, gastrojejunostomy anastomosis     HC ESOPH/GAS REFLUX TEST W NASAL IMPED ELECTRODE  8/5/2013    Procedure:  ESOPHAGEAL IMPEDENCE FUNCTION TEST 1 HOUR OR LESS;  Surgeon: Halie Lang MD;  [facility]     HEAD & NECK SURGERY  2/15/2017    C5-C6     HERNIA REPAIR  2006    Umbilical hernia     HERNIORRHAPHY HIATAL  6/22/2012    Procedure: HERNIORRHAPHY HIATAL;;  Surgeon: Francis Vyas MD;  [facility]     HERNIORRHAPHY INGUINAL  11/22/2013    Procedure: HERNIORRHAPHY INGUINAL;;  Surgeon: Francis Vyas MD;  [facility]     INSERT PORT VASCULAR ACCESS Right 12/19/2017    Procedure: INSERT PORT VASCULAR ACCESS;  Right Chest Port Placement ;  Surgeon: Lisandro Alejandro PA-C;  [facility]     LAPAROTOMY EXPLORATORY  11/22/2013    Procedure: LAPAROTOMY EXPLORATORY;  Exploratory Laparotomy, Upper Endoscopy, Left Inguinal Hernia Repair;  Surgeon: Francis Vyas MD;  [facility]     PICC INSERTION Right 03/16/2017    5fr DL BioFlo PICC, 42cm (3cm external) in the R medial brachial vein w/ tip in the SVC RA junction.     PICC INSERTION Left 09/23/2017    5fr DL BioFlo PICC, 45cm (1cm external) in the L basilic vein w/ tip in the SVC RA junction.     SHAYLEE EN Y BOWEL  2003     SOFT TISSUE SURGERY       SOFT TISSUE SURGERY       TONSILLECTOMY            Allergies:      Allergies   Allergen Reactions     Bactrim [Sulfamethoxazole W/Trimethoprim] Rash     Penicillins Anaphylaxis     Doxycycline Rash     Vancomycin Rash     Rash after receiving vancomycin 3/28/16 (red man's?). Tolerated with slower infusion and diphenhydramine premed.          Family History:   Reviewed and noncontributory  Family History   Problem Relation Age of Onset     GASTROINTESTINAL DISEASE Mother      Crohns disease     Anxiety Disorder Mother      Thyroid Disease Mother      Grave's disease     CANCER Father      ear cancer-skin cancer/melanoma     Breast Cancer Maternal Grandmother      DIABETES Maternal Uncle      Breast Cancer Other      Hypertension No family hx of      Hyperlipidemia No family hx of       "CEREBROVASCULAR DISEASE No family hx of      Prostate Cancer No family hx of      Depression No family hx of      Anesthesia Reaction No family hx of      Asthma No family hx of      OSTEOPOROSIS No family hx of      Genetic Disorder No family hx of      Obesity No family hx of      MENTAL ILLNESS No family hx of      Substance Abuse No family hx of           Social History:   Reviewed and noncontributory, except for what is noted in the HPI  Social History     Social History     Marital status:      Spouse name: Rose     Number of children: 1     Years of education: N/A     Occupational History     Not on file.     Social History Main Topics     Smoking status: Current Some Day Smoker     Packs/day: 0.10     Years: 3.00     Types: Cigarettes     Smokeless tobacco: Current User      Comment: I use an e cig every now and than     Alcohol use No      Comment: quit      Drug use: No     Sexual activity: Yes     Partners: Female     Birth control/ protection: None      Comment: no protection     Other Topics Concern     Parent/Sibling W/ Cabg, Mi Or Angioplasty Before 65f 55m? No     Social History Narrative     History   Sexual Activity     Sexual activity: Yes     Partners: Female     Birth control/ protection: None     Comment: no protection            Physical Exam:   Vitals were reviewed  Patient Vitals for the past 24 hrs:   BP Temp Temp src Pulse Resp SpO2 Height Weight   01/13/18 0741 116/59 97.9  F (36.6  C) Oral 63 18 99 % - -   01/13/18 0250 102/60 97.1  F (36.2  C) Oral 66 18 98 % - -   01/13/18 0127 108/59 - - - - - - -   01/12/18 2237 104/59 97.8  F (36.6  C) Oral 68 18 98 % 1.803 m (5' 11\") 84.4 kg (186 lb)     Ranges for his vital signs:  Temp:  [97.1  F (36.2  C)-97.9  F (36.6  C)] 97.9  F (36.6  C)  Pulse:  [63-68] 63  Resp:  [18] 18  BP: (102-116)/(59-60) 116/59  SpO2:  [98 %-99 %] 99 %    Intake/Output Summary (Last 24 hours) at 01/13/18 1051  Last data filed at 01/13/18 0546   Gross per 24 " hour   Intake             1000 ml   Output                0 ml   Net             1000 ml       Physical Exam:  Gen: pleasant man upright in bed receiving IV infusion, awake, alert, conversant, in NAD  HEENT: EOMI, PERRLA, MMM, no oral lesions  Neck: nontender, no adenopathy  Chest: nontender   Port in place on right chest; accessed, under older-appearing dressing  Cardiac: RRR without adventitious heart sounds  Lungs: clear in all fields A/P  Abd: soft, skin folds present, nontender on bedside exam, faint bowel sounds throughout, some striae  Back: no midline or paraspinal tenderness appreciated   Negative CVA percussion  Lymph: no appreciable adenopathy  Skin: warm, dry   G-tube site has follicular-type erythema and redness inferolateral to entry site   No expressable purulence is seen from G-tube site  Neuro: nonfocal exam  Psych: appropriate, A&O x 4         Laboratory Data:   Iron Testing  Recent Labs   Lab Test  01/12/18   2323   08/13/17   1900   02/10/17   1520   04/15/16   0848   01/06/15   1325  11/03/14   1355   05/20/14   1252   IRON   --    --   22*   --   50   < >   --    < >  87   --    --   69   FEB   --    --    --    --    --    --    --    --   281   --    --   329   IRONSAT   --    --    --    --    --    --    --    --   31   --    --   21   BROOKLYNN   --    --   10*   --   33   < >   --    < >   --   55   --   19*   MCV  95   < >  90   < >  94   < >  94   < >  93  94   < >  90   FOLIC   --    --    --    --    --    --   17.0   --    --    --    --    --    B12   --    --    --    --    --    --   624   --    --   420   --    --     < > = values in this interval not displayed.       Inflammatory Markers  Recent Labs   Lab Test  01/12/18   2323  10/08/17   1300  10/02/17   1030  09/24/17   1900  09/23/17   0728  09/22/17   0821  09/21/17   0653  09/20/17   0549   06/28/17   2222  03/12/17   0351   11/01/16   1530   04/20/16   1530  04/11/16   1842   01/16/16   0224  08/05/15   0022   SED   --    --     --   31*   --    --    --    --    --   26*  17*   --   27*   --   34*  11   --   29*  12   CRP  110.0*  <2.9  <2.9  26.6*  48.0*  60.0*  88.0*  82.0*   < >  53.0*  3.4   < >  8.4*   < >  12.3*  80.0*   < >  70.0*  7.3    < > = values in this interval not displayed.       Hematology Studies  Recent Labs   Lab Test  01/12/18 2323 01/12/18   0005  12/28/17   1430  12/19/17   0830  11/16/17   1130  10/17/17   1230  10/08/17   1300   WBC  12.4*  3.6*  6.6  8.3  9.2  6.9  5.2   ANEU  8.5*  3.2  4.0   --   5.4  3.6  2.7   AEOS  0.4  0.1  0.2   --   0.3  0.3  0.2   HGB  12.8*  12.3*  13.6  13.5  13.1*  13.1*  12.2*   MCV  95  95  97  95  96  94  91   PLT  76*  70*  141*  142*  153  142*  130*       Immune Globulin Studies  No lab results found.    Metabolic Studies   Recent Labs   Lab Test  01/13/18 0923  01/12/18 2323 01/12/18   0005  12/28/17   1430  12/21/17   1300   NA  143  140  141  143  142   POTASSIUM  3.2*  3.0*  2.8*  4.0  4.2   CHLORIDE  110*  108  110*  108  105   CO2  25  25  22  28  30   BUN  17  21  11  16  15   CR  0.84  1.05  0.77  0.65*  0.75   GFRESTIMATED  >90  76  >90  >90  >90       Hepatic Studies  Recent Labs   Lab Test  01/12/18 2323 01/12/18   0005  12/28/17   1430  12/18/17   1435  11/16/17   1130  10/17/17   1230   BILITOTAL  0.5  0.7  0.4  0.4  0.4  0.4   ALKPHOS  91  112  69  77  84  82   ALBUMIN  3.3*  3.1*  3.7  3.4  3.5  3.5   AST  24  27  13  26  18  23   ALT  55  50  25  27  31  24       Thyroid Studies  Recent Labs   Lab Test  11/03/14   1355  05/20/14   1252   TSH  0.96  1.39       Microbiology:  Culture Micro   Date Value Ref Range Status   01/12/2018 (A)  Preliminary    Cultured on the 1st day of incubation:  Gram negative rods     01/12/2018 (A)  Preliminary    Cultured on the 1st day of incubation:  Gram positive cocci in pairs and chains     01/12/2018   Preliminary    Critical Value/Significant Value, preliminary result only, called to and read back by  Shorty Gardner RN  7B @ 0836.cg 01/13/18 01/12/2018 No growth after 8 hours  Preliminary   01/12/2018 No growth after 1 day  Preliminary   01/12/2018 (A)  Preliminary    Cultured on the 1st day of incubation:  Escherichia coli     01/12/2018   Preliminary    Critical Value/Significant Value, preliminary result only, called to and read back by  Dr Jeet Orozco  in the [facility] at 8:20am 1/12/2018 ()     01/12/2018   Preliminary    (Note)  POSITIVE for E.COLI by Verigene multiplex nucleic acid test. Final  identification and antimicrobial susceptibility testing will be  verified by standard methods. Verigene test will not distinguish  E.coli from Shigella species including S.dysenteriae, S.flexneri,  S.boydii, and S.sonnei. Specimens containing Shigella species or  E.coli will be reported as Positive for E.coli.    Specimen tested with Verigene multiplex, gram-negative blood culture  nucleic acid test for the following targets: Acinetobacter sp.,  Citrobacter sp., Enterobacter sp., Proteus sp., E. coli, K.  pneumoniae/oxytoca, P. aeruginosa, and the following resistance  markers: CTXM, KPC, NDM, VIM, IMP and OXA.    Critical Value/Significant Value called to and read back by Dr. Jeet Orozco at 1042 on 1.12.18.KD     09/24/2017 No growth  Final   09/24/2017 No growth  Final   09/23/2017 No growth  Final   09/23/2017 No growth  Final   09/22/2017 No growth  Final   09/22/2017 No growth  Final   09/22/2017 No growth  Final   09/21/2017 No growth  Final   09/21/2017 (A)  Final    Cultured on the 1st day of incubation:  Streptococcus mitis group  Identification obtained by MALDI-TOF mass spectrometry research use only database. Test   characteristics determined and verified by the [facility]   ([facility]) [place], [place].     09/21/2017   Final    Critical Value/Significant Value, preliminary result only, called to and read back by  Nathaly Yo RN on 9/22/2017 @2011, tk     09/21/2017 (A)  Final    Cultured on the  1st day of incubation:  Streptococcus salivarius  Susceptibility testing done on previous specimen     09/21/2017   Final    Critical Value/Significant Value, preliminary result only, called to and read back by  Karine Meyers RN U5B 0400 09.22.17 CF     09/21/2017 (A)  Final    Cultured on the 1st day of incubation:  Rothia mucilaginosa  Susceptibility testing done on previous specimen     09/21/2017 (A)  Final    Cultured on the 1st day of incubation:  Staphylococcus epidermidis     09/21/2017 No growth  Final   09/21/2017 No growth  Final   09/20/2017 No growth  Final   09/20/2017 No growth  Final   09/19/2017 No growth  Final   09/19/2017 (A)  Preliminary    Cultured on the 2nd day of incubation:  Streptococcus salivarius  Susceptibility testing done on previous specimen     09/19/2017   Preliminary    Critical Value/Significant Value, preliminary result only, called to and read back by  Annel Hirsch RN at 0814 on 9.20.17.KD      09/19/2017 (A)  Preliminary    Cultured on the 2nd day of incubation:  Gram positive cocci in clusters  Susceptibility testing done on previous specimen     09/19/2017 Referred to research section for identification  Preliminary   09/19/2017 (A)  Final    Cultured on the 1st day of incubation:  Streptococcus salivarius     09/19/2017   Final    Critical Value/Significant Value, preliminary result only, called to and read back by  Dr. Jimenez, from [facility]. 09.19.17 at 1357. GR.     09/19/2017 (A)  Final    Cultured on the 1st day of incubation:  Streptococcus salivarius  Susceptibility testing done on previous specimen     09/19/2017   Final    Critical Value/Significant Value, preliminary result only, called to and read back by  Dr. Jimenez from [facility]. 09.19.17 at 1041. GR.     09/19/2017 (A)  Final    Cultured on the 1st day of incubation:  Streptococcus mitis group     09/19/2017 (A)  Final    Cultured on the 1st day of incubation:  Rothia mucilaginosa     09/19/2017   Final    Critical  Value/Significant Value called to and read back by  KACIE Rose RN on 9.23.17 at 1101. bw     09/19/2017   Final    (Note)  NEGATIVE for the following: Staphylococcus spp., Staph aureus, Staph  epidermidis, Staph lugdunensis, Streptococcus spp., Strep pneumoniae,  Strep pyogenes, Strep agalactiae, Strep anginosus group, Enterococcus  faecalis, Enterococcus faecium, and Listeria spp. by Verigene  multiplex nucleic acid test. Final identification and antimicrobial  susceptibility testing will be verified by standard methods.    Critical Value/Significant Value called to and read back by Dr. Jimenez from [facility]. 09.19.17 at 1357. GR.     08/01/2017 No growth  Final   06/28/2017 No growth  Final   06/28/2017 No growth  Final   06/26/2017 No growth  Final   06/26/2017 (A)  Final    Cultured on the 1st day of incubation: Streptococcus salivarius group  Cultured on the 1st day of incubation: Staphylococcus epidermidis  Critical Value/Significant Value, preliminary result only, called to and read   back by  Dr. Francis Vyas @1652 6/27/17. ct  Cultured on the 1st day of incubation: Rothia mucilaginosa  (Note)  POSITIVE for STAPHYLOCOCCUS EPIDERMIDIS and POSITIVE for the mecA  gene (resistant to methicillin) by Verigene multiplex nucleic acid  test. Final identification and antimicrobial susceptibility testing  will be verified by standard methods.    Specimen tested with Verigene multiplex, gram-positive blood culture  nucleic acid test for the following targets: Staph aureus, Staph  epidermidis, Staph lugdunensis, other Staph species, Enterococcus  faecalis, Enterococcus faecium, Streptococcus species, S. agalactiae,  S. anginosus grp., S. pneumoniae, S. pyogenes, Listeria sp., mecA  (methicillin resistance) and Melvin/B (vancomycin resistance).    Critical Value/Significant Value called to and read back by Dr. Vyas @ 1942 6/27/17 CS       04/11/2017 No growth  Final   04/09/2017 No growth  Final   04/09/2017 No growth   Final   03/12/2017 No growth  Final   03/12/2017 No growth  Final   03/08/2017 No growth  Final   03/07/2017 No growth  Final   03/07/2017 No growth  Final   10/30/2016 No growth  Final   10/29/2016 No growth  Final   10/28/2016 No growth  Final   10/27/2016 15 to 50 colonies Staphylococcus epidermidis (A)  Final   10/27/2016 (A)  Final    Cultured on the 1st day of incubation: Staphylococcus epidermidis Susceptibility   testing done on previous specimen  Critical Value/Significant Value, preliminary result only, called to and read   back by Shahrzad Long RN at 0028 [id number]     10/27/2016 No growth  Final   10/26/2016 (A)  Final    Cultured on the 1st day of incubation: Staphylococcus epidermidis Susceptibility   testing done on previous specimen  Critical Value/Significant Value called to and read back by Soraida Stafford RN U5A   10/27/16 0136 CF     10/26/2016 (A)  Final    Cultured on the 1st day of incubation: Staphylococcus epidermidis Susceptibility   testing done on previous specimen  Critical Value/Significant Value, preliminary result only, called to and read   back by Karis Nicole RN at 1553 on 10.26.16.KD     10/25/2016 (A)  Final    Cultured on the 1st day of incubation: Staphylococcus epidermidis Susceptibility   testing done on previous specimen  Critical Value/Significant Value, preliminary result only, called to and read   back by Tanya Acosta RN 0258 10/26/16. MS     10/25/2016 (A)  Final    Cultured on the 1st day of incubation: Staphylococcus epidermidis Susceptibility   testing done on previous specimen  Critical Value/Significant Value, preliminary result only, called to and read   back by  Goldie Serna RN on 5A, 10/26/16 @ 1359      10/24/2016 (A)  Final    Cultured on the 1st day of incubation: Staphylococcus epidermidis Susceptibility   testing done on previous specimen  Critical Value/Significant Value, preliminary result only, called to and read   back by  SREE HERNANDES CH RN (5A).  10.25.16 2204 GJS     10/24/2016 (A)  Final    Cultured on the 1st day of incubation: Staphylococcus epidermidis  Critical Value/Significant Value called to and read back by Jyothi Goins RN   URI 10/25/16 at 0550 CF  (Note)  POSITIVE for STAPHYLOCOCCUS EPIDERMIDIS and NEGATIVE for the mecA  gene (not resistant to methicillin) by Verigene nucleic acid test.  The mecA gene was not detected. Final identification and  antimicrobial susceptibility testing will be verified by standard  methods.    Specimen tested with Verigene multiplex, gram-positive blood culture  nucleic acid test for the following targets: Staph aureus, Staph  epidermidis, Staph lugdunensis, other Staph species, Enterococcus  faecalis, Enterococcus faecium, Streptococcus species, S. agalactiae,  S. anginosus grp., S. pneumoniae, S. pyogenes, Listeria sp., mecA  (methicillin resistance) and Melvin/B (vancomycin resistance).    Critical Value/Significant Value called to and read back by Shruthi Salas RN 10.25.16 at 0823am NK       08/10/2016 (A)  Final    Light growth Coagulase negative Staphylococcus  Susceptibility testing not routinely done     08/05/2016 No growth  Final   08/05/2016 No growth  Final   04/15/2016 No growth  Final   04/15/2016 No growth  Final   04/14/2016 No growth  Final   04/13/2016   Final    Canceled, Test credited Test reordered as correct code REORDERED AS A YEAST   CULTURE     04/13/2016 <15 colonies  Candida albicans (A)  Final   04/13/2016 No growth  Final   04/13/2016 No growth  Final   04/12/2016 No growth  Final   04/12/2016 (A)  Final    Cultured on the 4th day of incubation: Candida albicans  Critical Value/Significant Value, preliminary result only, called to and read   back by Dr. Godwin Egan at 0750 on 4.16.16, CN.     04/11/2016 (A)  Final    Cultured on the 2nd day of incubation: Candida albicans  Critical Value/Significant Value, preliminary result only, called to and read   back by Bartolo Oliver RN at  1713 on [id number].KD     04/11/2016 (A)  Final    Cultured on the 2nd day of incubation: Candida albicans  Critical Value/Significant Value, preliminary result only, called to and read   back by Masood Ayala RN 0159 4/13/16. MS  Susceptibility testing done on previous specimen     04/11/2016 No growth  Final   04/10/2016   Final    <10,000 colonies/mL mixed urogenital deepa  Susceptibility testing not routinely done     04/10/2016 (A)  Final    Cultured on the 1st day of incubation: Candida albicans  Critical Value/Significant Value, preliminary result only, called to and read   back by Dr. MURRAY 4.11.2016 @ 1445      03/29/2016 No growth  Final   03/29/2016 No growth  Final   03/28/2016 No growth  Final   03/28/2016 No growth  Final   01/17/2016 No growth  Final   01/17/2016 No growth  Final   01/16/2016 No growth  Final   01/16/2016 No growth  Final   01/14/2016 No growth  Final   01/14/2016 (A)  Final    Cultured on the 1st day of incubation: Staphylococcus epidermidis This isolate   DOES NOT demonstrate inducible clindamycin resistance in vitro.  Critical Value/Significant Value, preliminary result only, called to and read   back by  Estelle Scott (1/15/16 @ 2052) JR  (Note)  POSITIVE for STAPHYLOCOCCUS EPIDERMIDIS and NEGATIVE for the mecA  gene (not resistant to methicillin) by Verigene nucleic acid test.  The mecA gene was not detected. Final identification and  antimicrobial susceptibility testing will be verified by standard  methods.    Specimen tested with Verigene multiplex, gram-positive blood culture  nucleic acid test for the following targets: Staph aureus, Staph  epidermidis, Staph lugdunensis, other Staph species, Enterococcus  faecalis, Enterococcus faecium, Streptococcus species, S. agalactiae,  S. anginosus grp., S. pneumoniae, S. pyogenes, Listeria sp., mecA  (methicillin resistance) and Melvin/B (vancomycin resistance).    Critical Value/Significant Value called to and read  back by Dr. Estelle Alves on 1.15.16 at 2334. KG     12/01/2015 No growth  Final   12/01/2015 No growth  Final   09/11/2015 No growth  Final   09/11/2015 No growth  Final   09/11/2015 No growth  Final   08/30/2015 No growth  Final   08/30/2015 No growth  Final   08/13/2015 No growth  Final   08/05/2015 No growth  Final       Urine Studies  Recent Labs   Lab Test  01/12/18   0042  09/19/17   0242  08/01/17   1133  06/28/17   2339  10/25/16   1759   LEUKEST  Negative  Negative  Negative  Negative  Negative   WBCU  <1  2  O - 2  0  4*       Vancomycin Levels  Recent Labs   Lab Test  09/24/17   1900  09/22/17   0939  09/21/17   0849   VANCOMYCIN  17.6  16.8  8.3       Serostatus:  No results found for: CMVG, CMIGG, CMIG, CMIM, CMVIGM, CMVM, CMLTX, HSVG1, HSVG2, HSVTP1, LS5110, HS12M, HS12GR, HS1GR, HS2GR, HERPES, HSIM, HSIG, HSIGR, HSVIGMAB, HSVG1, VARICZOSAB, EBVIGG, EBIGG, EBVAGN, [id number]  No results found for: EBIG2, EBIGM, TOXG  No lab results found.    Invalid input(s): HSV12, VZVG, KIF685    Toxoplasma Testing  No lab results found.    Invalid input(s): TOXPL, TOXABM, TOXPCR    Virology:  CMV viral loads  No lab results found.  No results found for: CMQNT, CMVQ  EBV viral loads   No lab results found.  No results found for: EBVDN, EBRES, EBVDN, EBVSP, EBVPC, EBVPCR  BK viral loads No lab results found.    Invalid input(s): BKDN, BKVPC, BKVPCR  HSV testing  No lab results found.    Hepatitis B Testing Recent Labs   Lab Test  09/20/17   0549   HEPBANG  Nonreactive     Hepatitis C Testing   Hepatitis C Antibody   Date Value Ref Range Status   09/20/2017 Nonreactive NR^Nonreactive Final     Comment:     Assay performance characteristics have not been established for newborns,   infants, and children       Respiratory Virus Testing    No results found for: RS, FLUAG     Autoimmune Testing   No lab results found.    Invalid input(s): MPO, PRO3, C3, C4, VASPAN         Imaging:     Reviewed all relevant imaging for  this consult patient as of 01/13/2018     Recent Results (from the past 48 hour(s))   Chest XR,  PA & LAT    Narrative    Exam:  Chest radiograph, 1/12/2018 2:56 AM     History: Fever    Comparison:  9/19/2017    Findings:  PA and lateral view of the chest. Right-sided Port-A-Cath  with tip projecting over the low SVC. The cardiomediastinal silhouette  is unremarkable. No pleural effusions are present. There are new  poorly defined consolidations at the lung bases, right greater than  left confirmed on the lateral view. Upper abdominal bowel gas pattern  is normal. Cervical fixation is present.      Impression    Impression: Developing right lower lobe and possible left lower lobe  pneumonias.    I have personally reviewed the examination and initial interpretation  and I agree with the findings.    HILDA MCCAIN MD

## 2018-01-13 NOTE — ED PROVIDER NOTES
History     Chief Complaint   Patient presents with     E. Coli infection     HPI  Parker Acevedo Sr is a 47 year old male with a history of gastric ulcer disease, chronic abdominal pain, GERD, short gut syndrome, status-post Celestino-en-Y bowel (2003), cholecystectomy, gastrojejunostomy, gastrectomy, gastric bypass and appendectomy, with indwelling power port (last exchanged 12/19/17), and multiple previous PICC line and port infections.  Of note, the patient was evaluated here in the ED about 24 hours ago for back pain and fever at which time he had blood cultures drawn.  Patient's blood cultures from his port returned positive for E. coli, so he was contacted and recommended to come back here to the ED for further evaluation and treatment.  Currently, the patient reports that he is feeling better than he was yesterday, and notes that the back pain is less severe and has become intermittent now.  He reports that his max temperature was 100.1 F today. He denies any new symptoms including no cough or vomiting.  He reports that he has nausea at baseline and takes Zofran for this.  Patient reports that he has a right upper chest power port that he uses for TPN which was placed on 12/19/2017.    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)      Chronic abdominal pain      Difficulty swallowing      Gastric ulcer, unspecified as acute or chronic, without mention of hemorrhage, perforation, or obstruction      Gastro-oesophageal reflux disease      Head injury      Hiatal hernia      Other bladder disorder      Other chronic pain      Severe malnutrition (H)     TPN     Short gut syndrome      Tobacco abuse        Past Surgical History:   Procedure Laterality Date     APPENDECTOMY       BACK SURGERY   11/3/2014    curve in the spine     BIOPSY LYMPH NODE CERVICAL N/A 2/20/2015    Procedure: BIOPSY LYMPH NODE CERVICAL;  Surgeon: Baron Scanlon MD;  Location: [facility]     C GASTRIC BYPASS,OBESE<100CM SHAYLEE-EN-Y  2002    lost 300 pounds     CHOLECYSTECTOMY       DISCECTOMY, FUSION CERVICAL ANTERIOR ONE LEVEL, COMBINED N/A 2/15/2017    Procedure: COMBINED DISCECTOMY, FUSION CERVICAL ANTERIOR ONE LEVEL;  Surgeon: Darren Campos MD;  [facility]     ENDOSCOPIC INSERTION TUBE GASTROSTOMY  9/9/2013    Procedure: ENDOSCOPIC INSERTION TUBE GASTROSTOMY;;  Surgeon: Francis Vyas MD;  [facility]     ENDOSCOPIC ULTRASOUND UPPER GASTROINTESTINAL TRACT (GI)  4/29/2011    Procedure:ENDOSCOPIC ULTRASOUND UPPER GASTROINTESTINAL TRACT (GI); Both Procedures done Conjointly; Surgeon:NEREIDA HOUSER; [facility]     ENDOSCOPIC ULTRASOUND UPPER GASTROINTESTINAL TRACT (GI)  9/9/2013    Procedure: ENDOSCOPIC ULTRASOUND UPPER GASTROINTESTINAL TRACT (GI);  Endoscopic Ultrasound Guide Gastrostomy Tube Placement  C-arm;  Surgeon: Noe Lizarraga MD;  [facility]     ENDOSCOPY  03/25/11    EGD, [facility]     ENDOSCOPY  08/04/09    Upper Endoscopy, [facility]     ENDOSCOPY  01/05/09    Upper Endoscopy, [facility]     ESOPHAGOSCOPY, GASTROSCOPY, DUODENOSCOPY (EGD), COMBINED  4/20/2011    Procedure:COMBINED ESOPHAGOSCOPY, GASTROSCOPY, DUODENOSCOPY (EGD); Surgeon:FRANCIS VYAS; [facility]     ESOPHAGOSCOPY, GASTROSCOPY, DUODENOSCOPY (EGD), COMBINED  6/15/2011    Procedure:COMBINED ESOPHAGOSCOPY, GASTROSCOPY, DUODENOSCOPY (EGD); Surgeon:FRANCIS VYAS; [facility]     ESOPHAGOSCOPY, GASTROSCOPY, DUODENOSCOPY (EGD), COMBINED  6/12/2013    Procedure: COMBINED ESOPHAGOSCOPY, GASTROSCOPY, DUODENOSCOPY (EGD);;  Surgeon: Francis Vyas MD;  [facility]     ESOPHAGOSCOPY, GASTROSCOPY, DUODENOSCOPY (EGD), COMBINED  11/22/2013    Procedure: COMBINED ESOPHAGOSCOPY,  GASTROSCOPY, DUODENOSCOPY (EGD);;  Surgeon: Francis Vyas MD;  [facility]     ESOPHAGOSCOPY, GASTROSCOPY, DUODENOSCOPY (EGD), COMBINED  4/30/2014    Procedure: COMBINED ESOPHAGOSCOPY, GASTROSCOPY, DUODENOSCOPY (EGD);  Surgeon: Francis Vyas MD;  [facility]     ESOPHAGOSCOPY, GASTROSCOPY, DUODENOSCOPY (EGD), COMBINED N/A 2/20/2015    Procedure: COMBINED ESOPHAGOSCOPY, GASTROSCOPY, DUODENOSCOPY (EGD), BIOPSY SINGLE OR MULTIPLE;  Surgeon: Baron Scanlon MD;  [facility]     ESOPHAGOSCOPY, GASTROSCOPY, DUODENOSCOPY (EGD), COMBINED N/A 9/30/2015    Procedure: COMBINED ESOPHAGOSCOPY, GASTROSCOPY, DUODENOSCOPY (EGD);  Surgeon: Francis Vyas MD;  [facility]     GASTRECTOMY  6/22/2012    Procedure: GASTRECTOMY;  Open Approach, Excise Ulcers,Partial Gastrectomy, Esophagojejunostomy, Hiatal Hernia Repair, Extensive Lysis of Adhesions and Esaphagogastrodudenoscopy.;  Surgeon: Francis Vyas MD;  [facility]     GASTROJEJUNOSTOMY  08/26/09    Extensice enterolysis, partial resect. jejunum, part. resect gastric pouch, gastrojejunostomy anastomosis     HC ESOPH/GAS REFLUX TEST W NASAL IMPED ELECTRODE  8/5/2013    Procedure: ESOPHAGEAL IMPEDENCE FUNCTION TEST 1 HOUR OR LESS;  Surgeon: Halie Lang MD;  Location:  GI     HEAD & NECK SURGERY  2/15/2017    C5-C6     HERNIA REPAIR  2006    Umbilical hernia     HERNIORRHAPHY HIATAL  6/22/2012    Procedure: HERNIORRHAPHY HIATAL;;  Surgeon: Francis Vyas MD;  [facility]     HERNIORRHAPHY INGUINAL  11/22/2013    Procedure: HERNIORRHAPHY INGUINAL;;  Surgeon: Francis Vyas MD;  [facility]     INSERT PORT VASCULAR ACCESS Right 12/19/2017    Procedure: INSERT PORT VASCULAR ACCESS;  Right Chest Port Placement ;  Surgeon: Lisandro Alejandro PA-C;  [facility]     LAPAROTOMY EXPLORATORY  11/22/2013    Procedure: LAPAROTOMY EXPLORATORY;  Exploratory Laparotomy, Upper Endoscopy, Left Inguinal  Hernia Repair;  Surgeon: Francis Vyas MD;  [facility]     PICC INSERTION Right 03/16/2017    5fr DL BioFlo PICC, 42cm (3cm external) in the R medial brachial vein w/ tip in the SVC RA junction.     PICC INSERTION Left 09/23/2017    5fr DL BioFlo PICC, 45cm (1cm external) in the L basilic vein w/ tip in the SVC RA junction.     SHAYLEE EN Y BOWEL  2003     SOFT TISSUE SURGERY       SOFT TISSUE SURGERY       TONSILLECTOMY         Family History   Problem Relation Age of Onset     GASTROINTESTINAL DISEASE Mother      Crohns disease     Anxiety Disorder Mother      Thyroid Disease Mother      Grave's disease     CANCER Father      ear cancer-skin cancer/melanoma     Breast Cancer Maternal Grandmother      DIABETES Maternal Uncle      Breast Cancer Other      Hypertension No family hx of      Hyperlipidemia No family hx of      CEREBROVASCULAR DISEASE No family hx of      Prostate Cancer No family hx of      Depression No family hx of      Anesthesia Reaction No family hx of      Asthma No family hx of      OSTEOPOROSIS No family hx of      Genetic Disorder No family hx of      Obesity No family hx of      MENTAL ILLNESS No family hx of      Substance Abuse No family hx of        Social History   Substance Use Topics     Smoking status: Current Some Day Smoker     Packs/day: 0.10     Years: 3.00     Types: Cigarettes     Smokeless tobacco: Current User      Comment: I use an e cig every now and than     Alcohol use No      Comment: quit      Current Facility-Administered Medications   Medication     oxyCODONE (ROXICODONE) solution 10-15 mg     Current Outpatient Prescriptions   Medication     potassium bicarbonate (K-LYTE) 25 MEQ solu-tab     fentaNYL (DURAGESIC) 50 mcg/hr 72 hr patch     oxyCODONE (ROXICODONE) 5 MG/5ML solution     morphine 0.1% in intrasite topical gel     lidocaine (LIDODERM) 5 % Patch     Multiple Vitamin (MULTI-VITAMINS) TABS     amphetamine-dextroamphetamine (ADDERALL) 20 MG per tablet  "    amphetamine-dextroamphetamine (ADDERALL) 20 MG per tablet     amphetamine-dextroamphetamine (ADDERALL) 20 MG per tablet     nystatin-triamcinolone (MYCOLOG II) cream     mupirocin (BACTROBAN) 2 % ointment     Needle, Disp, (BD DISP NEEDLES) 27G X 1/2\" MISC     sucralfate (CARAFATE) 1 GM/10ML suspension     ondansetron (ZOFRAN-ODT) 8 MG ODT tab     cyanocobalamin (VITAMIN B12) 1000 MCG/ML injection     lidocaine-prilocaine (EMLA) cream     albuterol (PROAIR HFA/PROVENTIL HFA/VENTOLIN HFA) 108 (90 BASE) MCG/ACT Inhaler     vitamin D (ERGOCALCIFEROL) 06129 UNIT capsule     Carvedilol (COREG PO)     cyanocobalamin (VITAMIN B12) 1000 MCG/ML injection     naloxone (NARCAN) nasal spray     [facility]ED PATIENT     order for DME     [facility] PATIENT     order for DME     [DISCONTINUED] NO ACTIVE MEDICATIONS        Allergies   Allergen Reactions     Bactrim [Sulfamethoxazole W/Trimethoprim] Rash     Penicillins Anaphylaxis     Doxycycline Rash     Vancomycin Rash     Rash after receiving vancomycin 3/28/16 (red man's?). Tolerated with slower infusion and diphenhydramine premed.      I have reviewed the Medications, Allergies, Past Medical and Surgical History, and Social History in the Epic system.    Review of Systems   Constitutional: Negative for fever (100.1 F).   HENT: Negative for congestion.    Eyes: Negative for redness.   Respiratory: Negative for cough and shortness of breath.    Cardiovascular: Negative for chest pain.   Gastrointestinal: Positive for nausea (chronic). Negative for abdominal pain and vomiting.   Genitourinary: Negative for difficulty urinating.   Musculoskeletal: Positive for back pain. Negative for arthralgias and neck stiffness.   Skin: Negative for color change.   Neurological: Negative for headaches.   Psychiatric/Behavioral: Negative for confusion.   All other systems reviewed and are negative.      Physical Exam   BP: 104/59  Pulse: 68  Temp: 97.8 " " F (36.6  C)  Resp: 18  Height: 180.3 cm (5' 11\")  Weight: 84.4 kg (186 lb)  SpO2: 98 %      Physical Exam   Constitutional: He appears well-developed and well-nourished. No distress.   HENT:   Head: Normocephalic and atraumatic.   Mouth/Throat: Oropharynx is clear and moist. No oropharyngeal exudate.   Eyes: Pupils are equal, round, and reactive to light. No scleral icterus.   Cardiovascular: Normal rate, regular rhythm, normal heart sounds and intact distal pulses.    Pulmonary/Chest: Effort normal and breath sounds normal. No respiratory distress.       Abdominal: Soft. Bowel sounds are normal. There is no tenderness.   Musculoskeletal: Normal range of motion. He exhibits no edema or tenderness.   Neurological: He is alert. He has normal strength. Coordination normal.   Skin: Skin is warm. No rash noted. He is not diaphoretic.   Nursing note and vitals reviewed.      ED Course     ED Course     Procedures     11:23 PM  The patient was seen and examined by Dr. Stock in Room 23.          Results for orders placed or performed during the hospital encounter of 01/12/18 (from the past 24 hour(s))   CBC with platelets differential   Result Value Ref Range    WBC 12.4 (H) 4.0 - 11.0 10e9/L    RBC Count 4.05 (L) 4.4 - 5.9 10e12/L    Hemoglobin 12.8 (L) 13.3 - 17.7 g/dL    Hematocrit 38.6 (L) 40.0 - 53.0 %    MCV 95 78 - 100 fl    MCH 31.6 26.5 - 33.0 pg    MCHC 33.2 31.5 - 36.5 g/dL    RDW 14.4 10.0 - 15.0 %    Platelet Count 76 (L) 150 - 450 10e9/L    Diff Method Automated Method     % Neutrophils 68.8 %    % Lymphocytes 17.8 %    % Monocytes 9.5 %    % Eosinophils 3.4 %    % Basophils 0.2 %    % Immature Granulocytes 0.3 %    Nucleated RBCs 0 0 /100    Absolute Neutrophil 8.5 (H) 1.6 - 8.3 10e9/L    Absolute Lymphocytes 2.2 0.8 - 5.3 10e9/L    Absolute Monocytes 1.2 0.0 - 1.3 10e9/L    Absolute Eosinophils 0.4 0.0 - 0.7 10e9/L    Absolute Basophils 0.0 0.0 - 0.2 10e9/L    Abs Immature Granulocytes 0.0 0 - 0.4 10e9/L "    Absolute Nucleated RBC 0.0    Comprehensive metabolic panel   Result Value Ref Range    Sodium 140 133 - 144 mmol/L    Potassium 3.0 (L) 3.4 - 5.3 mmol/L    Chloride 108 94 - 109 mmol/L    Carbon Dioxide 25 20 - 32 mmol/L    Anion Gap 7 3 - 14 mmol/L    Glucose 88 70 - 99 mg/dL    Urea Nitrogen 21 7 - 30 mg/dL    Creatinine 1.05 0.66 - 1.25 mg/dL    GFR Estimate 76 >60 mL/min/1.7m2    GFR Estimate If Black >90 >60 mL/min/1.7m2    Calcium 7.9 (L) 8.5 - 10.1 mg/dL    Bilirubin Total 0.5 0.2 - 1.3 mg/dL    Albumin 3.3 (L) 3.4 - 5.0 g/dL    Protein Total 6.6 (L) 6.8 - 8.8 g/dL    Alkaline Phosphatase 91 40 - 150 U/L    ALT 55 0 - 70 U/L    AST 24 0 - 45 U/L   INR   Result Value Ref Range    INR 1.21 (H) 0.86 - 1.14   CRP inflammation   Result Value Ref Range    CRP Inflammation 110.0 (H) 0.0 - 8.0 mg/L     *Note: Due to a large number of results and/or encounters for the requested time period, some results have not been displayed. A complete set of results can be found in Results Review.        Critical Care time:    Discussed with ID- recommended ceftriaxone.  Medications   oxyCODONE (ROXICODONE) solution 10-15 mg (not administered)   cefTRIAXone (ROCEPHIN) 1 g in 10 mL SWFI Premix Syringe (1 g Intravenous Given 1/12/18 4978)   diphenhydrAMINE (BENADRYL) injection 50 mg (50 mg Intravenous Given 1/13/18 7174)             Assessments & Plan (with Medical Decision Making)   47 year old male with history of multiple abdominal surgeries and malabsorption syndrome who has a right chest port with multiple port infections to the emergency department for evaluation of a positive blood culture obtained from his port yesterday.  Today, his port blood culture grew gram-negative rods identified as E. coli.  Patient has had multiple port infections in the past and this port is only 1 month old.  Repeat blood cultures were obtained.  The patient is now leukocytosis but he is not as febrile today as he was yesterday.  After  discussion with infectious disease due to the patient's multiple antibiotic allergies including penicillin, ceftriaxone was recommended and given to the patient.  He will be admitted to the internal medicine service for further evaluation and management.    I have reviewed the nursing notes.    I have reviewed the findings, diagnosis, plan and need for follow up with the patient.  Current Discharge Medication List          Final diagnoses:   Infection of venous access port, initial encounter     IaSdie, am serving as a trained medical scribe to document services personally performed by Chuy Stock MD, based on the provider's statements to me.   IChuy MD, was physically present and have reviewed and verified the accuracy of this note documented by Sadie Sorenson.       1/12/2018   [facility], [place], EMERGENCY DEPARTMENT     Chuy Stock MD  01/13/18 0235

## 2018-01-14 ENCOUNTER — MYC MEDICAL ADVICE (OUTPATIENT)
Dept: PALLIATIVE MEDICINE | Facility: CLINIC | Age: 48
End: 2018-01-14

## 2018-01-14 LAB
ALBUMIN SERPL-MCNC: 2.7 G/DL (ref 3.4–5)
ALP SERPL-CCNC: 78 U/L (ref 40–150)
ALT SERPL W P-5'-P-CCNC: 37 U/L (ref 0–70)
ANION GAP SERPL CALCULATED.3IONS-SCNC: 6 MMOL/L (ref 3–14)
AST SERPL W P-5'-P-CCNC: 8 U/L (ref 0–45)
BACTERIA SPEC CULT: ABNORMAL
BILIRUB SERPL-MCNC: 0.4 MG/DL (ref 0.2–1.3)
BUN SERPL-MCNC: 11 MG/DL (ref 7–30)
CALCIUM SERPL-MCNC: 7.8 MG/DL (ref 8.5–10.1)
CHLORIDE SERPL-SCNC: 112 MMOL/L (ref 94–109)
CO2 SERPL-SCNC: 28 MMOL/L (ref 20–32)
CREAT SERPL-MCNC: 0.71 MG/DL (ref 0.66–1.25)
CRP SERPL-MCNC: 74 MG/L (ref 0–8)
ERYTHROCYTE [DISTWIDTH] IN BLOOD BY AUTOMATED COUNT: 14.4 % (ref 10–15)
GFR SERPL CREATININE-BSD FRML MDRD: >90 ML/MIN/1.7M2
GLUCOSE SERPL-MCNC: 83 MG/DL (ref 70–99)
HCT VFR BLD AUTO: 35.4 % (ref 40–53)
HGB BLD-MCNC: 11.4 G/DL (ref 13.3–17.7)
INR PPP: 1.12 (ref 0.86–1.14)
MAGNESIUM SERPL-MCNC: 2.1 MG/DL (ref 1.6–2.3)
MCH RBC QN AUTO: 30.7 PG (ref 26.5–33)
MCHC RBC AUTO-ENTMCNC: 32.2 G/DL (ref 31.5–36.5)
MCV RBC AUTO: 95 FL (ref 78–100)
PHOSPHATE SERPL-MCNC: 3.7 MG/DL (ref 2.5–4.5)
PLATELET # BLD AUTO: 77 10E9/L (ref 150–450)
POTASSIUM SERPL-SCNC: 3.5 MMOL/L (ref 3.4–5.3)
PROT SERPL-MCNC: 5.6 G/DL (ref 6.8–8.8)
RBC # BLD AUTO: 3.71 10E12/L (ref 4.4–5.9)
SODIUM SERPL-SCNC: 146 MMOL/L (ref 133–144)
SPECIMEN SOURCE: ABNORMAL
SPECIMEN SOURCE: ABNORMAL
WBC # BLD AUTO: 6.3 10E9/L (ref 4–11)

## 2018-01-14 PROCEDURE — 85610 PROTHROMBIN TIME: CPT | Performed by: INTERNAL MEDICINE

## 2018-01-14 PROCEDURE — 36415 COLL VENOUS BLD VENIPUNCTURE: CPT | Performed by: PHYSICIAN ASSISTANT

## 2018-01-14 PROCEDURE — 99233 SBSQ HOSP IP/OBS HIGH 50: CPT | Performed by: INTERNAL MEDICINE

## 2018-01-14 PROCEDURE — 84134 ASSAY OF PREALBUMIN: CPT | Performed by: INTERNAL MEDICINE

## 2018-01-14 PROCEDURE — 12000008 ZZH R&B INTERMEDIATE UMMC

## 2018-01-14 PROCEDURE — 83735 ASSAY OF MAGNESIUM: CPT | Performed by: INTERNAL MEDICINE

## 2018-01-14 PROCEDURE — 25000125 ZZHC RX 250: Performed by: INTERNAL MEDICINE

## 2018-01-14 PROCEDURE — 27210995 ZZH RX 272: Performed by: INTERNAL MEDICINE

## 2018-01-14 PROCEDURE — 87040 BLOOD CULTURE FOR BACTERIA: CPT | Performed by: PHYSICIAN ASSISTANT

## 2018-01-14 PROCEDURE — 25000132 ZZH RX MED GY IP 250 OP 250 PS 637: Performed by: PHYSICIAN ASSISTANT

## 2018-01-14 PROCEDURE — 40000556 ZZH STATISTIC PERIPHERAL IV START W US GUIDANCE

## 2018-01-14 PROCEDURE — 99207 ZZC APP CREDIT; MD BILLING SHARED VISIT: CPT | Performed by: PHYSICIAN ASSISTANT

## 2018-01-14 PROCEDURE — 25000132 ZZH RX MED GY IP 250 OP 250 PS 637: Performed by: INTERNAL MEDICINE

## 2018-01-14 PROCEDURE — 84100 ASSAY OF PHOSPHORUS: CPT | Performed by: INTERNAL MEDICINE

## 2018-01-14 PROCEDURE — 80053 COMPREHEN METABOLIC PANEL: CPT | Performed by: INTERNAL MEDICINE

## 2018-01-14 PROCEDURE — 36415 COLL VENOUS BLD VENIPUNCTURE: CPT | Performed by: INTERNAL MEDICINE

## 2018-01-14 PROCEDURE — 85027 COMPLETE CBC AUTOMATED: CPT | Performed by: INTERNAL MEDICINE

## 2018-01-14 PROCEDURE — 25000128 H RX IP 250 OP 636: Performed by: INTERNAL MEDICINE

## 2018-01-14 PROCEDURE — 86140 C-REACTIVE PROTEIN: CPT | Performed by: INTERNAL MEDICINE

## 2018-01-14 PROCEDURE — 40000141 ZZH STATISTIC PERIPHERAL IV START W/O US GUIDANCE

## 2018-01-14 PROCEDURE — 25000132 ZZH RX MED GY IP 250 OP 250 PS 637: Performed by: NURSE PRACTITIONER

## 2018-01-14 RX ORDER — ERGOCALCIFEROL 1.25 MG/1
50000 CAPSULE, LIQUID FILLED ORAL
Status: DISCONTINUED | OUTPATIENT
Start: 2018-01-14 | End: 2018-01-17 | Stop reason: HOSPADM

## 2018-01-14 RX ADMIN — SUCRALFATE 1 G: 1 SUSPENSION ORAL at 09:43

## 2018-01-14 RX ADMIN — MULTIVITAMIN 15 ML: LIQUID ORAL at 09:43

## 2018-01-14 RX ADMIN — OXYCODONE HYDROCHLORIDE 15 MG: 5 SOLUTION ORAL at 15:07

## 2018-01-14 RX ADMIN — ONDANSETRON 4 MG: 2 INJECTION INTRAMUSCULAR; INTRAVENOUS at 15:07

## 2018-01-14 RX ADMIN — OXYCODONE HYDROCHLORIDE 15 MG: 5 SOLUTION ORAL at 09:57

## 2018-01-14 RX ADMIN — TRIAMCINOLONE ACETONIDE: 1 CREAM TOPICAL at 13:12

## 2018-01-14 RX ADMIN — OXYCODONE HYDROCHLORIDE 15 MG: 5 SOLUTION ORAL at 19:11

## 2018-01-14 RX ADMIN — CEFTRIAXONE SODIUM 1 G: 10 INJECTION, POWDER, FOR SOLUTION INTRAVENOUS at 00:25

## 2018-01-14 RX ADMIN — ONDANSETRON 4 MG: 2 INJECTION INTRAMUSCULAR; INTRAVENOUS at 03:30

## 2018-01-14 RX ADMIN — CEFTRIAXONE SODIUM 1 G: 10 INJECTION, POWDER, FOR SOLUTION INTRAVENOUS at 21:43

## 2018-01-14 RX ADMIN — SUCRALFATE 1 G: 1 SUSPENSION ORAL at 16:51

## 2018-01-14 RX ADMIN — ERGOCALCIFEROL 50000 UNITS: 1.25 CAPSULE, LIQUID FILLED ORAL at 19:14

## 2018-01-14 RX ADMIN — FENTANYL 1 PATCH: 50 PATCH, EXTENDED RELEASE TRANSDERMAL at 09:39

## 2018-01-14 RX ADMIN — CARVEDILOL 12.5 MG: 25 TABLET, FILM COATED ORAL at 09:46

## 2018-01-14 RX ADMIN — ONDANSETRON 4 MG: 2 INJECTION INTRAMUSCULAR; INTRAVENOUS at 23:31

## 2018-01-14 RX ADMIN — CARVEDILOL 12.5 MG: 25 TABLET, FILM COATED ORAL at 21:00

## 2018-01-14 RX ADMIN — LIDOCAINE 2 PATCH: 560 PATCH PERCUTANEOUS; TOPICAL; TRANSDERMAL at 23:36

## 2018-01-14 RX ADMIN — VANCOMYCIN HYDROCHLORIDE 1750 MG: 10 INJECTION, POWDER, LYOPHILIZED, FOR SOLUTION INTRAVENOUS at 05:45

## 2018-01-14 RX ADMIN — DEXTROAMPHETAMINE SACCHARATE, AMPHETAMINE ASPARTATE, DEXTROAMPHETAMINE SULFATE AND AMPHETAMINE SULFATE 20 MG: 2.5; 2.5; 2.5; 2.5 TABLET ORAL at 09:39

## 2018-01-14 RX ADMIN — DIPHENHYDRAMINE HYDROCHLORIDE 25 MG: 25 SOLUTION ORAL at 15:07

## 2018-01-14 RX ADMIN — DEXTROAMPHETAMINE SACCHARATE, AMPHETAMINE ASPARTATE, DEXTROAMPHETAMINE SULFATE AND AMPHETAMINE SULFATE 20 MG: 2.5; 2.5; 2.5; 2.5 TABLET ORAL at 19:14

## 2018-01-14 RX ADMIN — NYSTATIN: 100000 CREAM TOPICAL at 13:12

## 2018-01-14 RX ADMIN — DIPHENHYDRAMINE HYDROCHLORIDE 25 MG: 25 SOLUTION ORAL at 20:59

## 2018-01-14 RX ADMIN — VANCOMYCIN HYDROCHLORIDE 1750 MG: 10 INJECTION, POWDER, LYOPHILIZED, FOR SOLUTION INTRAVENOUS at 16:51

## 2018-01-14 RX ADMIN — OXYCODONE HYDROCHLORIDE 15 MG: 5 SOLUTION ORAL at 23:35

## 2018-01-14 RX ADMIN — OXYCODONE HYDROCHLORIDE 15 MG: 5 SOLUTION ORAL at 03:30

## 2018-01-14 RX ADMIN — SUCRALFATE 1 G: 1 SUSPENSION ORAL at 19:13

## 2018-01-14 ASSESSMENT — PAIN DESCRIPTION - DESCRIPTORS: DESCRIPTORS: SORE

## 2018-01-14 NOTE — PROVIDER NOTIFICATION
Sent text page to cross cover at [id number] in regards to pt refusing TPN and lipids, also pt requesting to have milk at bedtime and diet is NPO. Pt wants to have changed.

## 2018-01-14 NOTE — PLAN OF CARE
Problem: Patient Care Overview  Goal: Plan of Care/Patient Progress Review  Outcome: No Change  0311-5054: Pt A&Ox4. VSS on RA, Up independently. Leaves floor. Refused to have TPN and lipids. Declined to have another PIV inserted. Still needs K replacement. Oxycodoe given x1 for pain in abdomen. NPO requested to have fluids and food. States at home has crackers and soups. Explained importance of being NPO to prevent nausea. Noted to have ritz crackers and starbucks cup at bedside reminded to not have anything by mouth. Fentanyl patch on right shoulder. Declines lidocaine patch.  No other concerns noted. Will continue to monitor and update care team as needed.

## 2018-01-14 NOTE — PROGRESS NOTES
Update:   Patient reporting he is agreeable to stay for port removal tomorrow and willing to stay afterward for monitoring and management of his infection and for discussions regarding nutrition and TPN.    [facility]

## 2018-01-14 NOTE — PLAN OF CARE
"Blood pressure 104/66, pulse 56, temperature 97.4  F (36.3  C), temperature source Oral, resp. rate 16, height 1.803 m (5' 11\"), weight 84.4 kg (186 lb), SpO2 100 %.    VSS, pt up ad vitor. Refuses a lot of care despite giving options and education. Pt is skeptical about his options of treatment, team just spoke with him regarding this. Plan is to remove infected port tomorrow and he can now eat regular diet. He states he does not want to eat, currently denies hunger, pain and nausea. Fentanyl patch in place on left deltoid. Has refused potassium replacement and level went up to 3.5 today, team aware. Dressing on peg tube changed and applied ointments to site. PIV saline locked, port de-accessed this am, dressing CDI. Continue plan of care.  "

## 2018-01-14 NOTE — PROVIDER NOTIFICATION
Sent FYI Page to care team to inform in regards to positive blood culture of gram +cocci in pairs and chains.

## 2018-01-14 NOTE — PROGRESS NOTES
CLINICAL NUTRITION SERVICES - REASSESSMENT NOTE     Nutrition Prescription    RECOMMENDATIONS FOR MDs/PROVIDERS TO ORDER:  -  If patient has true short gut, then he would need TPN support likely due to malabsorption and maldigestion of nutrients, regardless of quantity of PO intake.     - No BM since admit.     Recommendations already ordered by Registered Dietitian (RD):  Calorie count in progress, will monitor results.        Provider orders: Now on regular diet, please do calorie counts and assess need for TPN.    Chart reviewed: admitted on 1/12/2018. He has a history of multiple abdominal surgeries (RnYGB, lap corey, gastrojejunostomy, gastrectomy, appendectomy) c/b chronic abdominal pain, severe malnutrition, and short gut syndrome now on chronic TPN via Port since 2015 and is admitted for E. Coli Bacteremia.     - Has a G tube but only uses to vent  - TPN on hold due to no access. PPN started 1/13.   - Per chart note, patient does not tolerate anything by mouth unless it dissolves or is liquid.    BM: None recorded since admit     Monitoring/Evaluation  Progress toward goals will be monitored and evaluated per protocol.    Gautam Mckeon RD/BLANKA  Weekend Pager [phone]

## 2018-01-14 NOTE — CONSULTS
History and Physical     Parker Acevedo  MRN# [id number]   YOB: 1970 Age: 47 year old      Date of Admission:  1/12/2018        Chief Complaint:   CC: line infection         History of Present Illnes:     47M with complex PMH including short gut syndrome from complications of bariatric surgery on chronic TPN who presented to the ED yesterday with fevers and chills and found to be bacteremic from a central line infection. He has a history of RNYGB surgery in 2001 complicated by marginal ulcer, G tube placement in 2011, exploratory laparotomy, multiple lysis of adhesions, partial gastrectomy, esophagojejunostomy in July 2015. He currently has a Gtube that he uses for venting and is TPN dependent. He recently had his port replaced on 12/19 and had been getting his TPN at home when he developed fevers and chills. He can tolerate about 400-500 calories of food per day but gets bloated and has to vent his G tube at least once per day. Does have a history of PEG tube feedings in the past but became unable to tolerate due to bloating and so now only uses Gtube for venting. He  has been getting vancomycin and ceftriaxone during this admission. The patient spends much of his day ambulating around the hospital and takes several smoke breaks and so his evaluation by this provider was delayed today.      Past Medical History:  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)      Chronic abdominal pain      Difficulty swallowing      Gastric ulcer, unspecified as acute or chronic, without mention of hemorrhage, perforation, or obstruction      Gastro-oesophageal reflux disease      Head injury      Hiatal hernia      Other bladder disorder      Other chronic pain      Severe  malnutrition (H)     TPN     Short gut syndrome      Tobacco abuse        Past Surgical History:  Past Surgical History:   Procedure Laterality Date     APPENDECTOMY       BACK SURGERY  11/3/2014    curve in the spine     BIOPSY LYMPH NODE CERVICAL N/A 2/20/2015    Procedure: BIOPSY LYMPH NODE CERVICAL;  Surgeon: Baron Scanlon MD;  Location: [facility]     C GASTRIC BYPASS,OBESE<100CM SHAYLEE-EN-Y  2002    lost 300 pounds     CHOLECYSTECTOMY       DISCECTOMY, FUSION CERVICAL ANTERIOR ONE LEVEL, COMBINED N/A 2/15/2017    Procedure: COMBINED DISCECTOMY, FUSION CERVICAL ANTERIOR ONE LEVEL;  Surgeon: Darren Campos MD;  Location: [facility]     ENDOSCOPIC INSERTION TUBE GASTROSTOMY  9/9/2013    Procedure: ENDOSCOPIC INSERTION TUBE GASTROSTOMY;;  Surgeon: Francis Vyas MD;  [facility]     ENDOSCOPIC ULTRASOUND UPPER GASTROINTESTINAL TRACT (GI)  4/29/2011    Procedure:ENDOSCOPIC ULTRASOUND UPPER GASTROINTESTINAL TRACT (GI); Both Procedures done Conjointly; Surgeon:NEREIDA HOUSER; [facility]     ENDOSCOPIC ULTRASOUND UPPER GASTROINTESTINAL TRACT (GI)  9/9/2013    Procedure: ENDOSCOPIC ULTRASOUND UPPER GASTROINTESTINAL TRACT (GI);  Endoscopic Ultrasound Guide Gastrostomy Tube Placement  C-arm;  Surgeon: Noe Lizarraga MD;  [facility]     ENDOSCOPY  03/25/11    EGD, [facility]     ENDOSCOPY  08/04/09    Upper Endoscopy, [facility]     ENDOSCOPY  01/05/09    Upper Endoscopy, [facility]     ESOPHAGOSCOPY, GASTROSCOPY, DUODENOSCOPY (EGD), COMBINED  4/20/2011    Procedure:COMBINED ESOPHAGOSCOPY, GASTROSCOPY, DUODENOSCOPY (EGD); Surgeon:FRANCIS VYAS; [facility]     ESOPHAGOSCOPY, GASTROSCOPY, DUODENOSCOPY (EGD), COMBINED  6/15/2011    Procedure:COMBINED ESOPHAGOSCOPY, GASTROSCOPY, DUODENOSCOPY (EGD); Surgeon:FRANCIS VYAS; [facility]     ESOPHAGOSCOPY, GASTROSCOPY, DUODENOSCOPY (EGD), COMBINED  6/12/2013    Procedure: COMBINED ESOPHAGOSCOPY, GASTROSCOPY,  DUODENOSCOPY (EGD);;  Surgeon: Francis Vyas MD;  [facility]     ESOPHAGOSCOPY, GASTROSCOPY, DUODENOSCOPY (EGD), COMBINED  11/22/2013    Procedure: COMBINED ESOPHAGOSCOPY, GASTROSCOPY, DUODENOSCOPY (EGD);;  Surgeon: Francis Vyas MD;  [facility]     ESOPHAGOSCOPY, GASTROSCOPY, DUODENOSCOPY (EGD), COMBINED  4/30/2014    Procedure: COMBINED ESOPHAGOSCOPY, GASTROSCOPY, DUODENOSCOPY (EGD);  Surgeon: Francis Vyas MD;  [facility]     ESOPHAGOSCOPY, GASTROSCOPY, DUODENOSCOPY (EGD), COMBINED N/A 2/20/2015    Procedure: COMBINED ESOPHAGOSCOPY, GASTROSCOPY, DUODENOSCOPY (EGD), BIOPSY SINGLE OR MULTIPLE;  Surgeon: Baron Scanlon MD;  Location:  OR     ESOPHAGOSCOPY, GASTROSCOPY, DUODENOSCOPY (EGD), COMBINED N/A 9/30/2015    Procedure: COMBINED ESOPHAGOSCOPY, GASTROSCOPY, DUODENOSCOPY (EGD);  Surgeon: Francis Vyas MD;  Location:  GI     GASTRECTOMY  6/22/2012    Procedure: GASTRECTOMY;  Open Approach, Excise Ulcers,Partial Gastrectomy, Esophagojejunostomy, Hiatal Hernia Repair, Extensive Lysis of Adhesions and Esaphagogastrodudenoscopy.;  Surgeon: Francis Vyas MD;  [facility]     GASTROJEJUNOSTOMY  08/26/09    Extensice enterolysis, partial resect. jejunum, part. resect gastric pouch, gastrojejunostomy anastomosis     HC ESOPH/GAS REFLUX TEST W NASAL IMPED ELECTRODE  8/5/2013    Procedure: ESOPHAGEAL IMPEDENCE FUNCTION TEST 1 HOUR OR LESS;  Surgeon: Halie Lang MD;  Location:  GI     HEAD & NECK SURGERY  2/15/2017    C5-C6     HERNIA REPAIR  2006    Umbilical hernia     HERNIORRHAPHY HIATAL  6/22/2012    Procedure: HERNIORRHAPHY HIATAL;;  Surgeon: Francis Vyas MD;  Location:  OR     HERNIORRHAPHY INGUINAL  11/22/2013    Procedure: HERNIORRHAPHY INGUINAL;;  Surgeon: Francis Vyas MD;  [facility]     INSERT PORT VASCULAR ACCESS Right 12/19/2017    Procedure: INSERT PORT VASCULAR ACCESS;  Right Chest Port Placement ;   Surgeon: Lisandro Alejandro PA-C;  [facility]     LAPAROTOMY EXPLORATORY  11/22/2013    Procedure: LAPAROTOMY EXPLORATORY;  Exploratory Laparotomy, Upper Endoscopy, Left Inguinal Hernia Repair;  Surgeon: Francis Vyas MD;  [facility]     PICC INSERTION Right 03/16/2017    5fr DL BioFlo PICC, 42cm (3cm external) in the R medial brachial vein w/ tip in the SVC RA junction.     PICC INSERTION Left 09/23/2017    5fr DL BioFlo PICC, 45cm (1cm external) in the L basilic vein w/ tip in the SVC RA junction.     SHAYLEE EN Y BOWEL  2003     SOFT TISSUE SURGERY       SOFT TISSUE SURGERY       TONSILLECTOMY         Allergies:     Allergies   Allergen Reactions     Bactrim [Sulfamethoxazole W/Trimethoprim] Rash     Penicillins Anaphylaxis     Doxycycline Rash     Vancomycin Rash     Rash after receiving vancomycin 3/28/16 (red man's?). Tolerated with slower infusion and diphenhydramine premed.       Medications:    No current facility-administered medications on file prior to encounter.   Current Outpatient Prescriptions on File Prior to Encounter:  lidocaine (LIDODERM) 5 % Patch Place 1-2 patches onto the skin every 24 hours Wear for 12 hours, remove for 12 hours.  OK to cut to better fit to size.   nystatin-triamcinolone (MYCOLOG II) cream Apply topically 2 times daily as needed   mupirocin (BACTROBAN) 2 % ointment Apply topically 3 times daily   ondansetron (ZOFRAN-ODT) 8 MG ODT tab Take 1 tablet (8 mg) by mouth every 8 hours as needed for nausea   albuterol (PROAIR HFA/PROVENTIL HFA/VENTOLIN HFA) 108 (90 BASE) MCG/ACT Inhaler Inhale 2 puffs into the lungs every 4 hours as needed for shortness of breath / dyspnea or wheezing   Carvedilol (COREG PO) Take 12.5 mg by mouth 2 times daily   potassium bicarbonate (K-LYTE) 25 MEQ solu-tab Take 1 tablet (25 mEq) by mouth 2 times daily for 2 days   fentaNYL (DURAGESIC) 50 mcg/hr 72 hr patch Place 1 patch onto the skin every 48 hours MYLAN BRAND ONLY. Fill on/after  "1/12/18 to start on/after 1/15/18   oxyCODONE (ROXICODONE) 5 MG/5ML solution Take 10-15 mLs (10-15 mg) by mouth every 4 hours as needed for moderate to severe pain Max of 45 mg per day. Fill on/after 1/12/18 not to start untill 1/15/18.   morphine 0.1% in intrasite topical gel Apply as needed prior to accessing the port site.   Multiple Vitamin (MULTI-VITAMINS) TABS Take 1 tablet by mouth   amphetamine-dextroamphetamine (ADDERALL) 20 MG per tablet Take 1 tablet (20 mg) by mouth 2 times daily   amphetamine-dextroamphetamine (ADDERALL) 20 MG per tablet Take 1 tablet (20 mg) by mouth 2 times daily   amphetamine-dextroamphetamine (ADDERALL) 20 MG per tablet Take 1 tablet (20 mg) by mouth 2 times daily   Needle, Disp, (BD DISP NEEDLES) 27G X 1/2\" MISC 1 Device every 30 days Use for cyanocobalamin injection once q 30 days.   sucralfate (CARAFATE) 1 GM/10ML suspension Take 10 mLs (1 g) by mouth 4 times daily   cyanocobalamin (VITAMIN B12) 1000 MCG/ML injection Inject 1 mL (1,000 mcg) into the muscle every 30 days   lidocaine-prilocaine (EMLA) cream Apply topically as needed for moderate pain   vitamin D (ERGOCALCIFEROL) 17177 UNIT capsule Take 1 capsule (50,000 Units) by mouth every 7 days   cyanocobalamin (VITAMIN B12) 1000 MCG/ML injection Inject 1 mL into the muscle every 30 days   naloxone (NARCAN) nasal spray Spray 1 spray (4 mg) into one nostril alternating nostrils as needed for opioid reversal (every 2-3 minutes until assistance arrives.) (Patient not taking: Reported on 1/3/2018)   [facility] [place] HOME INFUSION MANAGED PATIENT Contact [facility] for patient specific medication information at [phone] on admission and discharge from the hospital.  Phones are answered 24 hours a day 7 days a week 365 days a year.Providers - Choose \"CONTINUE HOME MED (no script)\" at discharge if patient treatment with home infusion will continue.   order for DME Equipment being ordered: Bilateral knee high chronic venous " "insufficiency stockings--  mild-moderate pressures.   [facility] HOME INFUSION MANAGED PATIENT Contact [facility] for patient specific medication information at [phone] on admission and discharge from the hospital.  Phones are answered 24 hours a day 7 days a week 365 days a year.Providers - Choose \"CONTINUE HOME MED (no script)\" at discharge if patient treatment with home infusion will continue.   order for DME Injection Supplies for Vitamin B12: 3cc syringes w/ 27 gauge needles, 1/2 inch length   [DISCONTINUED] NO ACTIVE MEDICATIONS .       Social History:  Social History     Social History     Marital status:      Spouse name: Rose     Number of children: 1     Years of education: N/A     Occupational History     Not on file.     Social History Main Topics     Smoking status: Current Some Day Smoker     Packs/day: 0.10     Years: 3.00     Types: Cigarettes     Smokeless tobacco: Current User      Comment: I use an e cig every now and than     Alcohol use No      Comment: quit      Drug use: No     Sexual activity: Yes     Partners: Female     Birth control/ protection: None      Comment: no protection     Other Topics Concern     Parent/Sibling W/ Cabg, Mi Or Angioplasty Before 65f 55m? No     Social History Narrative       Family History:  Family History   Problem Relation Age of Onset     GASTROINTESTINAL DISEASE Mother      Crohns disease     Anxiety Disorder Mother      Thyroid Disease Mother      Grave's disease     CANCER Father      ear cancer-skin cancer/melanoma     Breast Cancer Maternal Grandmother      DIABETES Maternal Uncle      Breast Cancer Other      Hypertension No family hx of      Hyperlipidemia No family hx of      CEREBROVASCULAR DISEASE No family hx of      Prostate Cancer No family hx of      Depression No family hx of      Anesthesia Reaction No family hx of      Asthma No family hx of      OSTEOPOROSIS No family hx of      Genetic Disorder No family hx of      " "Obesity No family hx of      MENTAL ILLNESS No family hx of      Substance Abuse No family hx of        ROS:  The remainder of the complete ROS was negative unless noted in the HPI.    Exam:  /66 (BP Location: Right arm)  Pulse 56  Temp 97.4  F (36.3  C) (Oral)  Resp 16  Ht 1.803 m (5' 11\")  Wt 84.4 kg (186 lb)  SpO2 100%  BMI 25.94 kg/m2  General: Alert, interactive, NAD  HEENT: AT/NC, sclera anicteric, EOMI, OP clear with MMM  Neck: Supple, no JVD   Resp: clear to auscultation bilaterally, no crackles or wheezes  Cardiac: regular rate and rhythm, no murmur  Abdomen: Soft, nontender, nondistended. Multiple skin folds from weight loss. G tube clamped with minimal erythema surrounding incision  Extremities: No LE edema  Skin: Warm and dry, no jaundice or rash  Neuro: Alert & oriented x 3, Cns 2-12 intact, moves all extremities equally    Labs:  WBC 6.3 (12.4 on 1/12)  Hg 11.4  Plts 77  Albumin 2.7  Total protein 5.6  Na 146  K 3.5  Chloride 112  CRP 74    Imaging:  CXR 1/12: Developing right lower lobe and possible left lower lobe pneumonias.    Assessment/ Plan:  47M with short gut syndrome s/p RNYGB with multiple revisions, TPN dependent, with port site infection. Has Gtube that he uses for venting.      -agree with need to remove infected port and not replacing until blood cultures are clear  -continue PO intake as tolerated  -continue antibiotics, ID involved  -recommend nutrition consult  -further discussion with bariatric team in AM      Discussed with chief resident Dr. Vázquez.     Fifi Perry MD PGY2  General Surgery               "

## 2018-01-14 NOTE — PROGRESS NOTES
[facility] [place]    Internal Medicine Progress Note - Gold Service      Assessment & Plan   Parker Acevedo Sr is a 47 year old male admitted on 1/12/2018. He has a history of multiple abdominal surgeries (RnYGB, lap corey, gastrojejunostomy, gastrectomy, appendectomy) c/b chronic abdominal pain, severe malnutrition, and short gut syndrome now on chronic TPN via port and is admitted for E. Coli Bacteremia.     E. Coli & E. Faecalis Bacteremia likely CLABSI. History of recurrent Port-a-Cath infections most recently exchanged 12/19/17. Previous blood cx positive for strep salivarius and strep mitis (9/21/17), currently peripheral and port blood cx positive 1/12, 1/13 w/ E. Coli (pansensitive) and E. Faecalis (pansensitive). On Ceftriaxone 1/12/18-present and Vancomycin 1/14-present. Afebrile w/ leukocytosis resolved. CRP downtrending. Not septic appearing. ID consulted, appreciate recs  - Continue IV Ceftriaxone and IV Vancomycin (needs PO Benadryl prior)  - IR planning to remove port Monday 1/14 - no need to be NPO as there will be no sedation  - Cannot replace port or place PICC line until cultures are negative  - Trend CBC, CRP  - Pt mentioned wanting to leave AMA but has re-considered. Educated on risk of death if he leaves AMA.    Severe Malnutrition on Chronic TPN. Followed by Dr. Vyas. Due to chronic GI issues (s/p RnYGB, lap corey, gastrojejunostomy, gastrectomy, appendectomy) and short gut syndrome. Has a G tube placed by Dr. Vyas, but only uses to vent. Takes pills PO only if they dissolve or are liquid. On TPN for past 3 years.  - Nutrition consulted   - OK to continue TPN via peripheral line but pt has been refusing  - Continue G tube to vent - ID rec replacing but will hold off as this would not change skin infection  - Regular diet ok per Dr. Vyas  - Bariatric surgery consulted    Chronic Pain Syndrome. Reports lower back pain, abdominal pain. Follows in pain  "clinic w/ Dr. Milligan - last seen 1/3/18 with recs for following regimen:   - Continue PTA fentanyl, oxycodone 10-15mg q4hrs.   - Would strongly discourage further opioids or benzos    Thrombocytopenia. Previously wnl, currently 77. Has not received Heparin this admission. Possibly due to bacteremia. No s/s bleeding.  - Trend Plts    Hypokalemia. Pt refused PO replacement and IV \"hurts\". Encouraged IV replacement with lidocaine but patient still refused. Now potassium borderline wnl.    Yeast Infection surrounding G tube. Start BID mycostatin w/ kenalog cream. No need to replace G tube per Dr. Vyas.    JASON, resolved. Baseline Cr 0.7-0.8. Cr on admission 1.05 -->0.84 s/p IVF. Pt is IVF dependent at home, but reports has not used IVF in past week because he was busy moving. Trend BMP     Chronic, Stable Issues.  History of possible cardiomyopathy w/ recovered EF. Lowest EF ~45%, last 55% (9/2017). No signs of sepsis. Continue carvedilol 12.5mg BID  COPD/Asthma. Continue PTA inhaler  GERD/PUD. Continue PTA sucralfate  ADHD. Continue PTA adderall    Diet: parenteral nutrition - Clinimix E  Regular Diet Adult  Calorie Counts;   Fluids: None  DVT Prophylaxis: Pneumatic Compression Devices  Code Status: Full Code    Disposition Plan   Expected discharge: 4 - 7 days, recommended to prior living arrangement once antibiotic plan established.     Entered: Rene Marcano 01/14/2018, 4:42 PM   Information in the above section will display in the discharge planner report.      The patient's care was discussed with the Attending Physician, Dr. Willett and ID consultant.    Rene Marcano  Internal Medicine Staff Hospitalist Service  [facility]  Pager: [phone]  Please see sticky note for cross cover information    Interval History   Patient has no new symptoms. Reports chronic back and abdominal pain. States he was to leave AMA - discussed risk of death if he leaves. He denies chest pain, abdominal pain, " urinary symptoms, diarrhea.    Data reviewed today: I reviewed all medications, new labs and imaging results over the last 24 hours. I personally reviewed no images or EKG's today.    Physical Exam   Vital Signs: Temp: 97.4  F (36.3  C) Temp src: Oral BP: 104/66 Pulse: 56   Resp: 16 SpO2: 100 % O2 Device: None (Room air)    Weight: 186 lbs 0 oz  General Appearance: A&Ox3. NAD.    Respiratory: Normal effort on RA. Lungs CTAB. Diminished BS in bases.  Cardiovascular: RRR. S1, S2. No murmurs. Port in R upper chest.  GI: Abdomen soft, non-tender, non-distended.   Skin: G tube site w/ surrounding erythema and satellite lesions. Excess skin on abdomen.  Other: Trace peripheral edema.      Data   Data     Recent Labs  Lab 01/14/18  0643 01/13/18  0923 01/12/18  2323   WBC 6.3 11.3* 12.4*   HGB 11.4* 12.7* 12.8*   MCV 95 97 95   PLT 77* 79* 76*   INR 1.12  --  1.21*   * 143 140   POTASSIUM 3.5 3.2* 3.0*   CHLORIDE 112* 110* 108   CO2 28 25 25   BUN 11 17 21   CR 0.71 0.84 1.05   ANIONGAP 6 8 7   SILAS 7.8* 8.0* 7.9*   GLC 83 78 88   ALBUMIN 2.7*  --  3.3*   PROTTOTAL 5.6*  --  6.6*   BILITOTAL 0.4  --  0.5   ALKPHOS 78  --  91   ALT 37  --  55   AST 8  --  24     No results found for this or any previous visit (from the past 24 hour(s)).

## 2018-01-14 NOTE — PLAN OF CARE
Pt updated with plan of care--order to consult IR for removal of chest port. Upon assessment of chest port, accessed site was SRI with dressing hanging at the hub. Explained port needs de-accessed and covered with dressing. Pt stated 'if you de-access my port, I won't be poked again.' Port de-accessed and pt left unit. Paged team to update them.

## 2018-01-14 NOTE — PLAN OF CARE
Pt says he does not want port removed tomorrow and will leave AMA before IR procedure. Notified medicine team.

## 2018-01-14 NOTE — PLAN OF CARE
Problem: Pain, Acute (Adult)  Intervention: Monitor/Manage Analgesia  [id number]  A&O x4, calls appropriately. VSS, on room air. PRN Oxycodone given for pain; patient and SO request 15mg be given at night per usual scheduled. PRN Zofran given at that time also. Some discomfort surrounding PIV site, slight redness; discussed inserting new PIV prior to Vanco infusion to reduce chances of extravasation - patient was agreeable to this plan. Patient ambulates with spouse outside, cigarettes noted at patient's bedside. Reports voiding, not saving. No acute issues; continue to monitor and follow POC.

## 2018-01-14 NOTE — PLAN OF CARE
"Problem: Patient Care Overview  Goal: Plan of Care/Patient Progress Review  7 am-7:30 pm  Pt is alert and oriented x4.AVSS.Pt had oxycodone 10 mg solution po for abdominal pain that radiates to the back with relief. Pt has fentanyl patch 50 mcg on right deltoid.Pt's blood culture that was taken from pt's port cath on 1/12 18 came back positive for gram negative rods and gram positive cocci in chains and pairs per micro lab; Rene Marcano PA-C is aware.Pt started vancomycin IV this afternoon after pt was premedicated with benadryl 50 mg solution po.Pt is also on ceftriaxone IV. K is 3.2 from this morning, pt refused oral replacement stating that he does not tolerate the oral one and pt also refused IV replacement stating IV\" burns\". Pt was offered if he can have potassium with lidocaine, pt is agreeable to try, but pt refused a second PIV.Vanco was running this afternoon on current PIV. Pt has aline cath that was accessed prior to admission, but not to use it per Rene Marcano PA-C. Pt has order for clinimix and lipid to be started this evening, oncoming RN to follow up regarding a second IV access.Wife was at bed side, supportive of pt.Pt goes out of the floor independently.Continue with plan of care.      "

## 2018-01-15 ENCOUNTER — APPOINTMENT (OUTPATIENT)
Dept: INTERVENTIONAL RADIOLOGY/VASCULAR | Facility: CLINIC | Age: 48
DRG: 314 | End: 2018-01-15
Attending: NURSE PRACTITIONER
Payer: COMMERCIAL

## 2018-01-15 ENCOUNTER — TELEPHONE (OUTPATIENT)
Dept: FAMILY MEDICINE | Facility: CLINIC | Age: 48
End: 2018-01-15

## 2018-01-15 LAB
ALBUMIN SERPL-MCNC: 3 G/DL (ref 3.4–5)
ALP SERPL-CCNC: 81 U/L (ref 40–150)
ALT SERPL W P-5'-P-CCNC: 36 U/L (ref 0–70)
ANION GAP SERPL CALCULATED.3IONS-SCNC: 6 MMOL/L (ref 3–14)
AST SERPL W P-5'-P-CCNC: 12 U/L (ref 0–45)
BILIRUB SERPL-MCNC: 0.4 MG/DL (ref 0.2–1.3)
BUN SERPL-MCNC: 8 MG/DL (ref 7–30)
CALCIUM SERPL-MCNC: 8.2 MG/DL (ref 8.5–10.1)
CHLORIDE SERPL-SCNC: 112 MMOL/L (ref 94–109)
CO2 SERPL-SCNC: 27 MMOL/L (ref 20–32)
CREAT SERPL-MCNC: 0.62 MG/DL (ref 0.66–1.25)
CRP SERPL-MCNC: 45 MG/L (ref 0–8)
ERYTHROCYTE [DISTWIDTH] IN BLOOD BY AUTOMATED COUNT: 14.2 % (ref 10–15)
GFR SERPL CREATININE-BSD FRML MDRD: >90 ML/MIN/1.7M2
GLUCOSE SERPL-MCNC: 127 MG/DL (ref 70–99)
HCT VFR BLD AUTO: 37.7 % (ref 40–53)
HGB BLD-MCNC: 12.2 G/DL (ref 13.3–17.7)
INR PPP: 1.08 (ref 0.86–1.14)
MAGNESIUM SERPL-MCNC: 2.2 MG/DL (ref 1.6–2.3)
MCH RBC QN AUTO: 30.8 PG (ref 26.5–33)
MCHC RBC AUTO-ENTMCNC: 32.4 G/DL (ref 31.5–36.5)
MCV RBC AUTO: 95 FL (ref 78–100)
PHOSPHATE SERPL-MCNC: 3.3 MG/DL (ref 2.5–4.5)
PLATELET # BLD AUTO: 109 10E9/L (ref 150–450)
POTASSIUM SERPL-SCNC: 3.4 MMOL/L (ref 3.4–5.3)
PREALB SERPL IA-MCNC: 13 MG/DL (ref 15–45)
PREALB SERPL IA-MCNC: 15 MG/DL (ref 15–45)
PROT SERPL-MCNC: 6.3 G/DL (ref 6.8–8.8)
RBC # BLD AUTO: 3.96 10E12/L (ref 4.4–5.9)
SODIUM SERPL-SCNC: 145 MMOL/L (ref 133–144)
WBC # BLD AUTO: 6.9 10E9/L (ref 4–11)

## 2018-01-15 PROCEDURE — 25000125 ZZHC RX 250: Performed by: INTERNAL MEDICINE

## 2018-01-15 PROCEDURE — 12000008 ZZH R&B INTERMEDIATE UMMC

## 2018-01-15 PROCEDURE — 83735 ASSAY OF MAGNESIUM: CPT | Performed by: PHYSICIAN ASSISTANT

## 2018-01-15 PROCEDURE — 99152 MOD SED SAME PHYS/QHP 5/>YRS: CPT

## 2018-01-15 PROCEDURE — 99207 ZZC APP CREDIT; MD BILLING SHARED VISIT: CPT | Performed by: PHYSICIAN ASSISTANT

## 2018-01-15 PROCEDURE — 25000132 ZZH RX MED GY IP 250 OP 250 PS 637: Performed by: PHYSICIAN ASSISTANT

## 2018-01-15 PROCEDURE — 27210904 ZZH KIT CR6

## 2018-01-15 PROCEDURE — 80053 COMPREHEN METABOLIC PANEL: CPT | Performed by: PHYSICIAN ASSISTANT

## 2018-01-15 PROCEDURE — 87070 CULTURE OTHR SPECIMN AEROBIC: CPT | Performed by: RADIOLOGY

## 2018-01-15 PROCEDURE — 36415 COLL VENOUS BLD VENIPUNCTURE: CPT | Performed by: PHYSICIAN ASSISTANT

## 2018-01-15 PROCEDURE — 86140 C-REACTIVE PROTEIN: CPT | Performed by: PHYSICIAN ASSISTANT

## 2018-01-15 PROCEDURE — 27210738 ZZH ACCESSORY CR2

## 2018-01-15 PROCEDURE — 87186 SC STD MICRODIL/AGAR DIL: CPT | Performed by: RADIOLOGY

## 2018-01-15 PROCEDURE — 85610 PROTHROMBIN TIME: CPT | Performed by: PHYSICIAN ASSISTANT

## 2018-01-15 PROCEDURE — 87077 CULTURE AEROBIC IDENTIFY: CPT | Performed by: RADIOLOGY

## 2018-01-15 PROCEDURE — 40000141 ZZH STATISTIC PERIPHERAL IV START W/O US GUIDANCE

## 2018-01-15 PROCEDURE — 99153 MOD SED SAME PHYS/QHP EA: CPT

## 2018-01-15 PROCEDURE — 25000132 ZZH RX MED GY IP 250 OP 250 PS 637: Performed by: INTERNAL MEDICINE

## 2018-01-15 PROCEDURE — 84134 ASSAY OF PREALBUMIN: CPT | Performed by: PHYSICIAN ASSISTANT

## 2018-01-15 PROCEDURE — 84100 ASSAY OF PHOSPHORUS: CPT | Performed by: PHYSICIAN ASSISTANT

## 2018-01-15 PROCEDURE — 36590 REMOVAL TUNNELED CV CATH: CPT

## 2018-01-15 PROCEDURE — 25000128 H RX IP 250 OP 636: Performed by: RADIOLOGY

## 2018-01-15 PROCEDURE — 25000128 H RX IP 250 OP 636: Performed by: INTERNAL MEDICINE

## 2018-01-15 PROCEDURE — 85027 COMPLETE CBC AUTOMATED: CPT | Performed by: PHYSICIAN ASSISTANT

## 2018-01-15 PROCEDURE — 99233 SBSQ HOSP IP/OBS HIGH 50: CPT | Performed by: INTERNAL MEDICINE

## 2018-01-15 PROCEDURE — 25000125 ZZHC RX 250: Performed by: RADIOLOGY

## 2018-01-15 RX ORDER — FLUMAZENIL 0.1 MG/ML
0.2 INJECTION, SOLUTION INTRAVENOUS
Status: DISCONTINUED | OUTPATIENT
Start: 2018-01-15 | End: 2018-01-15 | Stop reason: HOSPADM

## 2018-01-15 RX ORDER — DIPHENHYDRAMINE HYDROCHLORIDE 50 MG/ML
25 INJECTION INTRAMUSCULAR; INTRAVENOUS ONCE
Status: COMPLETED | OUTPATIENT
Start: 2018-01-15 | End: 2018-01-15

## 2018-01-15 RX ORDER — NALOXONE HYDROCHLORIDE 0.4 MG/ML
.1-.4 INJECTION, SOLUTION INTRAMUSCULAR; INTRAVENOUS; SUBCUTANEOUS
Status: DISCONTINUED | OUTPATIENT
Start: 2018-01-15 | End: 2018-01-15 | Stop reason: HOSPADM

## 2018-01-15 RX ORDER — FENTANYL CITRATE 50 UG/ML
25-50 INJECTION, SOLUTION INTRAMUSCULAR; INTRAVENOUS EVERY 5 MIN PRN
Status: DISCONTINUED | OUTPATIENT
Start: 2018-01-15 | End: 2018-01-15 | Stop reason: HOSPADM

## 2018-01-15 RX ORDER — VANCOMYCIN HYDROCHLORIDE 1 G/200ML
1000 INJECTION, SOLUTION INTRAVENOUS
Status: COMPLETED | OUTPATIENT
Start: 2018-01-15 | End: 2018-01-15

## 2018-01-15 RX ADMIN — MIDAZOLAM 1 MG: 1 INJECTION INTRAMUSCULAR; INTRAVENOUS at 14:33

## 2018-01-15 RX ADMIN — LIDOCAINE HYDROCHLORIDE 9 ML: 10 INJECTION, SOLUTION EPIDURAL; INFILTRATION; INTRACAUDAL; PERINEURAL at 15:16

## 2018-01-15 RX ADMIN — SUCRALFATE 1 G: 1 SUSPENSION ORAL at 20:04

## 2018-01-15 RX ADMIN — SUCRALFATE 1 G: 1 SUSPENSION ORAL at 07:31

## 2018-01-15 RX ADMIN — OXYCODONE HYDROCHLORIDE 15 MG: 5 SOLUTION ORAL at 12:09

## 2018-01-15 RX ADMIN — ONDANSETRON 4 MG: 2 INJECTION INTRAMUSCULAR; INTRAVENOUS at 12:09

## 2018-01-15 RX ADMIN — VANCOMYCIN HYDROCHLORIDE 1750 MG: 10 INJECTION, POWDER, LYOPHILIZED, FOR SOLUTION INTRAVENOUS at 10:06

## 2018-01-15 RX ADMIN — SUCRALFATE 1 G: 1 SUSPENSION ORAL at 12:09

## 2018-01-15 RX ADMIN — ONDANSETRON 4 MG: 4 TABLET, ORALLY DISINTEGRATING ORAL at 22:06

## 2018-01-15 RX ADMIN — CARVEDILOL 12.5 MG: 25 TABLET, FILM COATED ORAL at 07:30

## 2018-01-15 RX ADMIN — OXYCODONE HYDROCHLORIDE 15 MG: 5 SOLUTION ORAL at 16:42

## 2018-01-15 RX ADMIN — DEXTROAMPHETAMINE SACCHARATE, AMPHETAMINE ASPARTATE, DEXTROAMPHETAMINE SULFATE AND AMPHETAMINE SULFATE 20 MG: 2.5; 2.5; 2.5; 2.5 TABLET ORAL at 20:04

## 2018-01-15 RX ADMIN — DEXTROAMPHETAMINE SACCHARATE, AMPHETAMINE ASPARTATE, DEXTROAMPHETAMINE SULFATE AND AMPHETAMINE SULFATE 20 MG: 2.5; 2.5; 2.5; 2.5 TABLET ORAL at 07:30

## 2018-01-15 RX ADMIN — SUCRALFATE 1 G: 1 SUSPENSION ORAL at 16:48

## 2018-01-15 RX ADMIN — FENTANYL CITRATE 50 MCG: 50 INJECTION, SOLUTION INTRAMUSCULAR; INTRAVENOUS at 14:37

## 2018-01-15 RX ADMIN — CARVEDILOL 12.5 MG: 25 TABLET, FILM COATED ORAL at 20:16

## 2018-01-15 RX ADMIN — DIPHENHYDRAMINE HYDROCHLORIDE 25 MG: 25 SOLUTION ORAL at 20:16

## 2018-01-15 RX ADMIN — OXYCODONE HYDROCHLORIDE 15 MG: 5 SOLUTION ORAL at 07:30

## 2018-01-15 RX ADMIN — DIPHENHYDRAMINE HYDROCHLORIDE 25 MG: 25 SOLUTION ORAL at 07:31

## 2018-01-15 RX ADMIN — OXYCODONE HYDROCHLORIDE 15 MG: 5 SOLUTION ORAL at 03:45

## 2018-01-15 RX ADMIN — TRIAMCINOLONE ACETONIDE: 1 CREAM TOPICAL at 12:13

## 2018-01-15 RX ADMIN — DIPHENHYDRAMINE HYDROCHLORIDE 25 MG: 50 INJECTION, SOLUTION INTRAMUSCULAR; INTRAVENOUS at 14:27

## 2018-01-15 RX ADMIN — OXYCODONE HYDROCHLORIDE 15 MG: 5 SOLUTION ORAL at 21:52

## 2018-01-15 RX ADMIN — VANCOMYCIN HYDROCHLORIDE 1000 MG: 1 INJECTION, SOLUTION INTRAVENOUS at 14:46

## 2018-01-15 RX ADMIN — MULTIVITAMIN 15 ML: LIQUID ORAL at 07:30

## 2018-01-15 RX ADMIN — NYSTATIN: 100000 CREAM TOPICAL at 12:14

## 2018-01-15 RX ADMIN — I.V. FAT EMULSION 250 ML: 20 EMULSION INTRAVENOUS at 20:05

## 2018-01-15 RX ADMIN — ASCORBIC ACID, VITAMIN A PALMITATE, CHOLECALCIFEROL, THIAMINE HYDROCHLORIDE, RIBOFLAVIN-5 PHOSPHATE SODIUM, PYRIDOXINE HYDROCHLORIDE, NIACINAMIDE, DEXPANTHENOL, ALPHA-TOCOPHEROL ACETATE, VITAMIN K1, FOLIC ACID, BIOTIN, CYANOCOBALAMIN: 200; 3300; 200; 6; 3.6; 6; 40; 15; 10; 150; 600; 60; 5 INJECTION, SOLUTION INTRAVENOUS at 20:05

## 2018-01-15 NOTE — PROGRESS NOTES
Patient Name: Parker Acevedo Sr  Medical Record Number: [id number]  Today's Date: 1/15/2018    Procedure: Right Chest Port Removal  Proceduralist: MYRIAM Fitch MD    Sedation start time: 1430  Sedation end time: 1500  Sedation medications administered: versed 1 mg, fentanyl 50 mcg, benadryl 25 mg  Total sedation time: 30 min    Procedure start time: 1433  Puncture time: 433 incision  Procedure end time: 1503    Report given to:  CHRISTY Nash  : none needed    Other Notes: Pt arrived to IR room 1 from . Pt denies any questions or concerns regarding procedure. Pt positioned supine on stretcher and monitored per protocol. Pt tolerated procedure without any noted complications. Pt transferred back to . Portacath catheter tip sent to lab for culture.

## 2018-01-15 NOTE — TELEPHONE ENCOUNTER
Discussed the situation with Dr.Katie Willett at the [facility].  Patient has 2 bacteria growing from his blood cultures, both appear to be likely from fecal contamination.  Discussed some concern over whether or not the infections are self-inflicted and deliberate.  Cannot prove.  The staff will also look at possibly feeding through gastrostomy tube to avoid deep lines which are constantly getting infected.     Follow up as needed.    Esteban Daly MD

## 2018-01-15 NOTE — PLAN OF CARE
Problem: Patient Care Overview  Goal: Plan of Care/Patient Progress Review  Outcome: No Change  AVSS. Pt found to have infiltrated PIV at start of shift.  See extravasation flowsheet. Pt refused further PIV's. See MD notification. Planning for Port removal today.  Oxycodone every 4hrs and Lidocaine patch to right shoulder.  Up independently in halls, outside to smoke.  Cont POC.

## 2018-01-15 NOTE — PLAN OF CARE
Problem: Patient Care Overview  Goal: Plan of Care/Patient Progress Review  Pt is AxOx4. VSS. Pt PIV blew with vanco running. New PIV placed. Benadryl given again to start vanco again. MD request. Started again. No issues at this time. TPN and lipids refused. MD aware. Pt continues to go outside. Unknown if machine is affected. G tube not being used or port. Plans to remove port tomorrow. Voiding independently.

## 2018-01-15 NOTE — PROGRESS NOTES
1/12/18 late entry Clinic Care Coordination Contact    Situation: Patient chart reviewed by care coordinator.    Background: RN CC was going to outreach for ED follow up.    Assessment: Patient was back in ED on 1/12/18.    Plan/Recommendations: RN CC will monitor for admission to hospital or ED discharge and then outreach to patient once discharged.    Melissa Behl BSN, RN, N  [facility] Care Coordinator  [phone]

## 2018-01-15 NOTE — PROGRESS NOTES
Care Coordinator Progress Note     Admission Date/Time:  1/12/2018  Attending MD:  Patti Willett*     Data  Chart reviewed, discussed with interdisciplinary team.   Patient was admitted for: Infection of venous access port, initial encounter.    Concerns with insurance coverage for discharge needs: None.  Current Living Situation: Patient lives with spouse.  Support System: Supportive and Involved provides dc rides also  Services Involved: Home Infusion, Bariatric clinic follows  Transportation: Family or Friend will provide  Barriers to Discharge: NO iv access for home TPN- see MD team notes.        Assessment  Per discussion in interdisciplinary rounds, the primary team states that the patient continues to require hospitalization for the following reasons: BC positive, port removal in progress now, no long term IV placement at this time.  Team states that the patient will require hospitalization for 2 days to determine nutrition needs.  Pt was open to [facility] with cyclic TPN at home.  RNCC will continue to follow for restart or other MD recommendations for nutritional needs.  Providers to meet with pt to discuss.      Shruthi Shelby, CHRISTYCC, BSN    [facility]    Medicine Group  [address] [place] [id number]    [email or web address]  [email or web address]    Office: [phone] Pager: [phone]

## 2018-01-15 NOTE — CONSULTS
Patient is on IR schedule 1/15/2018 for a right sided chest port removal for bacteremia.   Labs WNL for procedure.   Patient is NPO per primary team's report.  Consent will be done prior to procedure.     [place] the [place] [phone] for estimated time of procedure.     Case discussed with Dr. Fitch from IR and Best Dozier PA-C. If and when patient requires central venous access in the future for TPN, IR will only offer a tunneled CVC. This is the 5th port the patient has had removed for bacteremia since 3/2016. Ports are not generally offered for TPN use due to the necessity for continued access, risk of infection, and skin breakdown. This was reviewed with Best Dozier PA-C.     Patti Garvey DNP, APRN  Interventional Radiology  Phone: [phone]  Pager: [phone]

## 2018-01-15 NOTE — SIGNIFICANT EVENT
Upon initial assessment at 2330 found PIV to infiltrated when going to give Zofran.  Vanco infusing at the time from previous shift.  Dr Puga #[id number] notified. Extravasation policy/extravasation flowsheet initiated.

## 2018-01-15 NOTE — PLAN OF CARE
Updated Gold 3 DENNIS Dozier: pt currently has no IV access, IV extravasated vanco last night and he does not want IV at this time. Vascular consult to be done today. Pt and wife also requesting that patient be sedated or 'put under' for procedure due to intolerable pain the last time his port was removed. He has been NPO and will stay NPO for now.

## 2018-01-15 NOTE — PLAN OF CARE
Notified PA of possible vanco extravasation at another IV infiltrate site. Site reddened and marked, pt is itching (vanco allergy--premedicated with benadryl po as ordered this AM). Pt refuses ice pack to site. New IV started by vascular nurse. Will monitor left arm.

## 2018-01-15 NOTE — PROGRESS NOTES
[facility], [facility]    Internal Medicine Progress Note - Gold Service      Assessment & Plan   Parker Acevedo Sr is a 47 year old male admitted on 1/12/2018. He has a history of multiple abdominal surgeries (RnYGB, lap corey, gastrojejunostomy, gastrectomy, appendectomy) c/b chronic abdominal pain, severe malnutrition, and short gut syndrome now on chronic TPN via port and is admitted for E. Coli Bacteremia.      E. Coli & E. Faecalis Bacteremia likely CLABSI. History of recurrent Port-a-Cath infections most recently exchanged 12/19/17. Previous blood cx positive for strep salivarius and strep mitis (9/21/17), currently peripheral and port blood cx positive 1/12, 1/13 w/ E. Coli (pansensitive) and E. Faecalis (pansensitive). On Ceftriaxone 1/12/18-present and Vancomycin 1/14-present. Remains afebrile with normal white count since 1/13. Most recent BCs x 2 from 1/14 NGTD. CRP continues on downtrend (this am 45; peak of 110 on 1/12). Went to IR this afternoon and had port removed. Port tip sent to lab for culture.   - Continue IV Ceftriaxone and IV Vancomycin (needs PO Benadryl prior)  - F/u results of BCs from 1/14 to ensure continued no growth.   - Place CVC in IR once BCs from 1/14 NG for at least 48 hrs (see below).   - Trend CBC, CRP     Severe Malnutrition on Chronic TPN. Followed by Dr. Vyas. Due to chronic GI issues (s/p RnYGB, lap corey, gastrojejunostomy, gastrectomy, appendectomy) and short gut syndrome. Has a G tube placed by Dr. Vyas, but only uses to vent. Takes pills PO only if they dissolve or are liquid. On TPN for past 3 years via portacath, which was removed today given recurrent bacteremia and pt no longer candidate to have port replaced per IR, but is candidate for tunneled CVC and now pt agreeable to this as he currently desires to avoid TFs.    - Bariatric surgery consulted and appreciate recommendations.  - Will consult IR again to place tunneled CVC to  "resume TPN.  - In interim, continue PPN via PIV.   - Despite pt currently declining TFs as future option for nutritional needs, will go ahead with obtaining upper GI with small bowel follow thru study as recommended by Bariatric surgery just in case he has issues with TPN via tunneled CVC route.   - Nutrition also consulted and appreciate following. Continue regular diet for comfort.       Chronic Pain Syndrome. Reports lower back pain, abdominal pain. Follows in pain clinic w/ Dr. Milligan - last seen 1/3/18 with recs for following regimen:   - Continue PTA fentanyl, oxycodone 10-15mg q4hrs.   - Would strongly discourage further opioids or benzos     Thrombocytopenia, improving. Previously wnl, currently 109 (77). Has not received Heparin this admission. Possibly due to bacteremia. No s/s bleeding.  - Trend Plts     Hypokalemia, resolved: Pt refused PO replacement and IV replacement \"hurts\". Encouraged IV replacement with lidocaine but patient still refused. Now potassium borderline wnl.     Yeast Infection surrounding G tube. Continue BID mycostatin w/ kenalog cream.      JASON, resolved. Baseline Cr 0.7-0.8. Cr on admission 1.05 -->0.84 s/p IVF. Pt is IVF dependent at home, but reports has not used IVF in past week because he was busy moving. Cr this am 0.62. Trend BMP      Chronic, Stable Issues.  History of possible cardiomyopathy w/ recovered EF. Lowest EF ~45%, last 55% (9/2017). No signs of sepsis. Continue carvedilol 12.5mg BID  COPD/Asthma. Continue PTA inhaler  GERD/PUD. Continue PTA sucralfate  ADHD. Continue PTA adderall     Diet: parenteral nutrition - Clinimix E (agreed to restart this evening)  Regular Diet Adult  Calorie Counts;   Fluids: None  DVT Prophylaxis: Pneumatic Compression Devices  Code Status: Full Code      Disposition Plan   Expected discharge: 2 - 3 days, recommended to prior living arrangement once nutrition plan established and most recent BC remains no growth .     Entered: Koko " Benton Dozier 01/15/2018, 3:44 PM   Information in the above section will display in the discharge planner report.      The patient's care was discussed with the Attending Physician, Dr. Willett and Bedside Nurse.    Best Dozier PA-C  Internal Medicine Hospitalist   [facility]  [phone]     Interval History   Denies acute physical concerns at this time including fever, chills, chest pain, SOB, nausea, abd pain, bowel and bladder concerns. Understands that his port is coming later today and that future nutrition will be via TPN by tunneled catheter that would need to be placed in near future or TFs via GJ tube.       Data reviewed today: I reviewed all medications, new labs and imaging results over the last 24 hours. I personally reviewed Attending Physician, Dr. Willett and Bedside Nurse.    Physical Exam   Vital Signs: Temp: 97.3  F (36.3  C) Temp src: Oral BP: 115/79 Pulse: 70 Heart Rate: 58 Resp: 16 SpO2: 98 % O2 Device: None (Room air) Oxygen Delivery: 3 LPM  Weight: 186 lbs 0 oz  GEN: In NAD  HEENT: NCAT; PERRL; sclerae non-icteric; edentulous w/ MMM  LUNGS: CTAB  CV: RRR  ABD: +BSs; SNTND; GT intact with no surrounding erythema.   EXT: No BLE edema  SKIN: No acute rashes noted on exposed areas.  NEURO: AAOx3; CNs grossly intact; No acute focal deficits noted.          Data    CMP  Recent Labs  Lab 01/15/18  0801 01/14/18  0643 01/13/18  0923 01/12/18  2323 01/12/18  0005   * 146* 143 140 141   POTASSIUM 3.4 3.5 3.2* 3.0* 2.8*   CHLORIDE 112* 112* 110* 108 110*   CO2 27 28 25 25 22   ANIONGAP 6 6 8 7 9   * 83 78 88 94   BUN 8 11 17 21 11   CR 0.62* 0.71 0.84 1.05 0.77   GFRESTIMATED >90 >90 >90 76 >90   GFRESTBLACK >90 >90 >90 >90 >90   SILAS 8.2* 7.8* 8.0* 7.9* 7.5*   MAG 2.2 2.1 2.0  --   --    PHOS 3.3 3.7 3.2  --   --    PROTTOTAL 6.3* 5.6*  --  6.6* 6.1*   ALBUMIN 3.0* 2.7*  --  3.3* 3.1*   BILITOTAL 0.4 0.4  --  0.5 0.7   ALKPHOS 81 78  --  91 112   AST 12 8  --  24 27    ALT 36 37  --  55 50     CBC  Recent Labs  Lab 01/15/18  0801 01/14/18  0643 01/13/18  0923 01/12/18  2323   WBC 6.9 6.3 11.3* 12.4*   RBC 3.96* 3.71* 4.03* 4.05*   HGB 12.2* 11.4* 12.7* 12.8*   HCT 37.7* 35.4* 39.1* 38.6*   MCV 95 95 97 95   MCH 30.8 30.7 31.5 31.6   MCHC 32.4 32.2 32.5 33.2   RDW 14.2 14.4 14.5 14.4   * 77* 79* 76*     INR  Recent Labs  Lab 01/15/18  0801 01/14/18  0643 01/12/18  2323   INR 1.08 1.12 1.21*     Lab Results   Component Value Date    CRP 45.0 01/15/2018    CRP 74.0 01/14/2018

## 2018-01-15 NOTE — PLAN OF CARE
VSS, on RA. Pt leaves unit throughout shift. Gold 3 PA to put in order for power wand IV (recommended by vascular RN), requested MIVF for hydration during times pt is refusing TPN and unable to eat/drink. Pain controlled with liquid oxycodone, nausea controlled with IV zofran. Extravasation site on right arm has not extended past marking, site is pink. Possible extravasation site on left arm (from today) is no longer discolored or itchy, looks normal and pt states it feels okay. PA notified/saw IV infiltrate sites. G-tube clamped, pt reports it is used for venting only, site is less red since yesterday, applied triamcinilone and nystatin as ordered and changed dressing. +voiding, not saving despite request to save. No BM. NPO for IR procedure to remove chest port. Continue plan of care.

## 2018-01-15 NOTE — TELEPHONE ENCOUNTER
Hope you do better.  Let me know when you are out.  You may end up with extra patches in the end, depending on how long you are in the hospital, since you won't use your home supply while there.  Your dose may also be different.  Please give an update when you are discharged. The team can also reach out to me at any time.    Jessica Milligan MD  [facility]

## 2018-01-15 NOTE — PROGRESS NOTES
Gold Crosscover:  Notified by RN of infiltrated RUE PIV, this is patient's second infiltrated PIV today and he is now refusing placement of another PIV tonight. He received his ceftriaxone dosing this evening, and per nursing had received over half his vancomycin dose when his PIV infiltrated. Site on RUE marked and RN initiated protocol for PIV infiltration with vancomycin. I will notify primary team in AM of events overnight.   Shannon Puga MD  [phone]

## 2018-01-15 NOTE — PROGRESS NOTES
WOC nurse here for a follow up assessment for his G-tube site   Patient reports that his skin is intact with new dressing had just being replaced prior to my visit with him  Patient and his wife states they have a plan for caring for the tube site   No WOC follow-up needed

## 2018-01-15 NOTE — PROCEDURES
Interventional Radiology Brief Post Procedure Note    Procedure: IR PORT REMOVAL RIGHT    Proceduralist: Eduardo Fitch MD    Assistant: None    Time Out: Prior to the start of the procedure and with procedural staff participation, I verbally confirmed the patient s identity using two indicators, relevant allergies, that the procedure was appropriate and matched the consent or emergent situation, and that the correct equipment/implants were available. Immediately prior to starting the procedure I conducted the Time Out with the procedural staff and re-confirmed the patient s name, procedure, and site/side. (The Joint Commission universal protocol was followed.)  Yes    Medications   Medication Event Details Admin User Admin Time       Sedation: IR Nurse Monitored Care   Post Procedure Summary:  Prior to the start of the procedure and with procedural staff participation, I verbally confirmed the patient s identity using two indicators, relevant allergies, that the procedure was appropriate and matched the consent or emergent situation, and that the correct equipment/implants were available. Immediately prior to starting the procedure I conducted the Time Out with the procedural staff and re-confirmed the patient s name, procedure, and site/side. (The Joint Commission universal protocol was followed.)  Yes       Sedatives: Fentanyl and Midazolam (Versed)    Vital signs, airway and pulse oximetry were monitored and remained stable throughout the procedure and sedation was maintained until the procedure was complete.  The patient was monitored by staff until sedation discharge criteria were met.    Patient tolerance: Patient tolerated the procedure well with no immediate complications.    Time of sedation in minutes: 30 Minutes minutes from beginning to end of physician one to one monitoring.          Findings: R IJV DL portacath removed.  Pocket closed.  Tip sent for cultures.    Estimated Blood Loss:  Minimal    Fluoroscopy Time: 0 minute(s)    SPECIMENS: Catheter tip    Complications: 1. None     Condition: Stable    Plan: Routine post op cares. Next access to be SL Cm for TPN.  No further portacaths to be placed for this indication. Discussed and agreed upon with patient and Dr. Willett (present).        Comments: See dictated procedure note for full details.    Eduardo Fitch MD

## 2018-01-15 NOTE — PROCEDURES
Interventional Radiology Pre-Procedure Sedation Assessment   Time of Assessment: 2:18 PM    Expected Level: Moderate Sedation    Indication: Sedation is required for the following type of Procedure: Portacath removal    Sedation and procedural consent: Risks, benefits and alternatives were discussed with Patient    PO Intake: Appropriately NPO for procedure    ASA Class: Class 3 - SEVERE SYSTEMIC DISEASE, DEFINITE FUNCTIONAL LIMITATIONS.    Mallampati: Grade 1:  Soft palate, uvula, tonsillar pillars, and posterior pharyngeal wall visible    Lungs: Lungs Clear with good breath sounds bilaterally    Heart: Normal heart sounds and rate    History and physical reviewed and no updates needed. I have reviewed the lab findings, diagnostic data, medications, and the plan for sedation. I have determined this patient to be an appropriate candidate for the planned sedation and procedure and have reassessed the patient IMMEDIATELY PRIOR to sedation and procedure.    Eduardo Fitch MD

## 2018-01-15 NOTE — PROGRESS NOTES
"Calorie Count  Intake recorded for: 1/14  Kcals: 32  Protein: 2g  # Meals Recorded: (\"1 bite\" of grilled cheese, chicken noodle soup, pureed banana, saltine crackers, 1% milk)  # Supplements Recorded: 0    "

## 2018-01-15 NOTE — PROGRESS NOTES
This is a recent snapshot of the patient's [facility] medical record.  For current drug dose and complete information and questions, call [phone] or In [facility] pool, fv home infusion ([id number])  CSN Number:  [id number]

## 2018-01-16 ENCOUNTER — APPOINTMENT (OUTPATIENT)
Dept: GENERAL RADIOLOGY | Facility: CLINIC | Age: 48
DRG: 314 | End: 2018-01-16
Attending: PHYSICIAN ASSISTANT
Payer: COMMERCIAL

## 2018-01-16 LAB
CRP SERPL-MCNC: 26 MG/L (ref 0–8)
ERYTHROCYTE [DISTWIDTH] IN BLOOD BY AUTOMATED COUNT: 13.9 % (ref 10–15)
HCT VFR BLD AUTO: 40.2 % (ref 40–53)
HGB BLD-MCNC: 13 G/DL (ref 13.3–17.7)
MCH RBC QN AUTO: 31.1 PG (ref 26.5–33)
MCHC RBC AUTO-ENTMCNC: 32.3 G/DL (ref 31.5–36.5)
MCV RBC AUTO: 96 FL (ref 78–100)
PLATELET # BLD AUTO: 116 10E9/L (ref 150–450)
RBC # BLD AUTO: 4.18 10E12/L (ref 4.4–5.9)
WBC # BLD AUTO: 9 10E9/L (ref 4–11)

## 2018-01-16 PROCEDURE — 25000132 ZZH RX MED GY IP 250 OP 250 PS 637: Performed by: INTERNAL MEDICINE

## 2018-01-16 PROCEDURE — 25000125 ZZHC RX 250: Performed by: INTERNAL MEDICINE

## 2018-01-16 PROCEDURE — 40000556 ZZH STATISTIC PERIPHERAL IV START W US GUIDANCE

## 2018-01-16 PROCEDURE — 85027 COMPLETE CBC AUTOMATED: CPT | Performed by: PHYSICIAN ASSISTANT

## 2018-01-16 PROCEDURE — 12000008 ZZH R&B INTERMEDIATE UMMC

## 2018-01-16 PROCEDURE — 36415 COLL VENOUS BLD VENIPUNCTURE: CPT | Performed by: PHYSICIAN ASSISTANT

## 2018-01-16 PROCEDURE — 99207 ZZC CDG-MDM COMPONENT: MEETS LOW - DOWN CODED: CPT | Performed by: PHYSICIAN ASSISTANT

## 2018-01-16 PROCEDURE — 99232 SBSQ HOSP IP/OBS MODERATE 35: CPT | Performed by: PHYSICIAN ASSISTANT

## 2018-01-16 PROCEDURE — 25000128 H RX IP 250 OP 636: Performed by: INTERNAL MEDICINE

## 2018-01-16 PROCEDURE — 27210995 ZZH RX 272: Performed by: INTERNAL MEDICINE

## 2018-01-16 PROCEDURE — 40000982 ZZH STATISTICAL HAIKU-CANTO PHOTO

## 2018-01-16 PROCEDURE — 74250 X-RAY XM SM INT 1CNTRST STD: CPT

## 2018-01-16 PROCEDURE — 86140 C-REACTIVE PROTEIN: CPT | Performed by: PHYSICIAN ASSISTANT

## 2018-01-16 PROCEDURE — 25000132 ZZH RX MED GY IP 250 OP 250 PS 637: Performed by: PHYSICIAN ASSISTANT

## 2018-01-16 RX ORDER — CLINDAMYCIN PHOSPHATE 900 MG/50ML
900 INJECTION, SOLUTION INTRAVENOUS
Status: CANCELLED | OUTPATIENT
Start: 2018-01-17

## 2018-01-16 RX ADMIN — CARVEDILOL 12.5 MG: 25 TABLET, FILM COATED ORAL at 08:53

## 2018-01-16 RX ADMIN — OXYCODONE HYDROCHLORIDE 15 MG: 5 SOLUTION ORAL at 04:54

## 2018-01-16 RX ADMIN — CEFTRIAXONE SODIUM 1 G: 10 INJECTION, POWDER, FOR SOLUTION INTRAVENOUS at 00:41

## 2018-01-16 RX ADMIN — CARVEDILOL 12.5 MG: 25 TABLET, FILM COATED ORAL at 19:57

## 2018-01-16 RX ADMIN — FENTANYL 1 PATCH: 50 PATCH, EXTENDED RELEASE TRANSDERMAL at 08:52

## 2018-01-16 RX ADMIN — MULTIVITAMIN 15 ML: LIQUID ORAL at 08:53

## 2018-01-16 RX ADMIN — SUCRALFATE 1 G: 1 SUSPENSION ORAL at 19:57

## 2018-01-16 RX ADMIN — CEFTRIAXONE SODIUM 1 G: 10 INJECTION, POWDER, FOR SOLUTION INTRAVENOUS at 22:21

## 2018-01-16 RX ADMIN — OXYCODONE HYDROCHLORIDE 15 MG: 5 SOLUTION ORAL at 13:14

## 2018-01-16 RX ADMIN — OXYCODONE HYDROCHLORIDE 15 MG: 5 SOLUTION ORAL at 19:56

## 2018-01-16 RX ADMIN — ONDANSETRON 4 MG: 4 TABLET, ORALLY DISINTEGRATING ORAL at 13:14

## 2018-01-16 RX ADMIN — VANCOMYCIN HYDROCHLORIDE 1750 MG: 10 INJECTION, POWDER, LYOPHILIZED, FOR SOLUTION INTRAVENOUS at 10:25

## 2018-01-16 RX ADMIN — VANCOMYCIN HYDROCHLORIDE 1750 MG: 10 INJECTION, POWDER, LYOPHILIZED, FOR SOLUTION INTRAVENOUS at 00:41

## 2018-01-16 RX ADMIN — DEXTROAMPHETAMINE SACCHARATE, AMPHETAMINE ASPARTATE, DEXTROAMPHETAMINE SULFATE AND AMPHETAMINE SULFATE 20 MG: 2.5; 2.5; 2.5; 2.5 TABLET ORAL at 19:57

## 2018-01-16 RX ADMIN — VANCOMYCIN HYDROCHLORIDE 1750 MG: 10 INJECTION, POWDER, LYOPHILIZED, FOR SOLUTION INTRAVENOUS at 23:22

## 2018-01-16 RX ADMIN — DIPHENHYDRAMINE HYDROCHLORIDE 25 MG: 25 SOLUTION ORAL at 08:53

## 2018-01-16 RX ADMIN — DEXTROAMPHETAMINE SACCHARATE, AMPHETAMINE ASPARTATE, DEXTROAMPHETAMINE SULFATE AND AMPHETAMINE SULFATE 20 MG: 2.5; 2.5; 2.5; 2.5 TABLET ORAL at 08:52

## 2018-01-16 RX ADMIN — OXYCODONE HYDROCHLORIDE 15 MG: 5 SOLUTION ORAL at 00:28

## 2018-01-16 RX ADMIN — I.V. FAT EMULSION 250 ML: 20 EMULSION INTRAVENOUS at 20:01

## 2018-01-16 RX ADMIN — SUCRALFATE 1 G: 1 SUSPENSION ORAL at 08:53

## 2018-01-16 RX ADMIN — OXYCODONE HYDROCHLORIDE 15 MG: 5 SOLUTION ORAL at 09:00

## 2018-01-16 RX ADMIN — DIPHENHYDRAMINE HYDROCHLORIDE 25 MG: 25 SOLUTION ORAL at 22:21

## 2018-01-16 RX ADMIN — SUCRALFATE 1 G: 1 SUSPENSION ORAL at 11:38

## 2018-01-16 RX ADMIN — ASCORBIC ACID, VITAMIN A PALMITATE, CHOLECALCIFEROL, THIAMINE HYDROCHLORIDE, RIBOFLAVIN-5 PHOSPHATE SODIUM, PYRIDOXINE HYDROCHLORIDE, NIACINAMIDE, DEXPANTHENOL, ALPHA-TOCOPHEROL ACETATE, VITAMIN K1, FOLIC ACID, BIOTIN, CYANOCOBALAMIN: 200; 3300; 200; 6; 3.6; 6; 40; 15; 10; 150; 600; 60; 5 INJECTION, SOLUTION INTRAVENOUS at 20:01

## 2018-01-16 NOTE — PROGRESS NOTES
Calorie Counts  Intake recorded for: 1/15  Kcals: 145  Protein: 11g  # Meals Recorded: 25% purred banana, grilled ham and cheese sandwich  # Supplements Recorded: 0

## 2018-01-16 NOTE — PLAN OF CARE
"Problem: Patient Care Overview  Goal: Plan of Care/Patient Progress Review  Outcome: No Change  BP (!) 155/99 (BP Location: Right arm)  Pulse 70  Temp 96.7  F (35.9  C) (Oral)  Resp 18  Ht 1.803 m (5' 11\")  Wt 84.4 kg (186 lb)  SpO2 98%  BMI 25.94 kg/m2   Afebrile, pain is controlled with oral oxy 15mg, IV abx infused as per orders, IV infiltrated last evening x1, refused PPN infusion, MD notified, Dr. Wolf came to see pt, pt agreed on IV abx infusions only, slept between cares, reported this am having a panic attack, VSS stable, BP slight elevated, up ad vitor, voiding adequately, +BS, passing flatus, no BM, previous port site is intact, covered with liquid bandage, cont with POC      "

## 2018-01-16 NOTE — PROGRESS NOTES
Minimally Invasive Surgery Progress Note  1/15/2018    24 hour events/Subjective:   - Afebrile overnight    - Continues on TPN, bits of regular diet for comfort   - IR port removed yesterday    O:  Temp:  [96.7  F (35.9  C)-99.1  F (37.3  C)] 97.2  F (36.2  C)  Pulse:  [70] 70  Heart Rate:  [44-90] 52  Resp:  [12-18] 18  BP: (108-155)/(67-99) 108/67  SpO2:  [96 %-100 %] 98 %  I/O last 3 completed shifts:  In: 310 [P.O.:60; I.V.:250]  Out: 1000 [Urine:1000]    NAD, sitting in bed  RRR  NLB on RA  Abd exam deferred    A/P: Parker Acevedo Sr is a 47 year old male with hx of RYGB s/p multiple revisions, G tube in remnant for venting, and 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 This is his 5th port since 3/2016 due to prior line infections. Port removed by IR yesterday.     - Patient continues to have suboptimal nutrition. We would consider converting G tube through gastric remnant into GJ for enteral feeds. Of note patient is on TPN due to bloating and intolerance of tube feeds. This TPN is notably NOT for short gut syndrome.  - We discussed again with patient our recommendation for upper GI study to rule out distal obstruction. Patient states he is tired of workup and refuses to undergo further imaging at this time.  - Patient expresses understanding and refuses any change in his G-tube or workup to that end.   - MIS will sign off.  If patient is interested in changes to his G tube or nutritional access please feel free to call us back.      Discussed & seen with chief resident, who will discuss with staff.    Kanu Davis MD  PGY-2 General Surgery

## 2018-01-16 NOTE — PROGRESS NOTES
[facility] ID SERVICE: ONGOING CONSULTATION        Patient:  Parker Acevedo , Date of birth 1970, Medical record number [id number]  Date of Visit:  01/15/2018  Consult Requested by: Patti Willett*   Admission Diagnosis: Infection of venous access port, initial encounter [T80.219A]  Consult Question: ? Port infection / bacteremia         Assessment and Recommendations:     Impression:  1. CLABSI due to infected infusion port (thus far, with E coli)  2. E coli bacteremia  3. Enterococcus faecalis bacteremia  4. Recurrent infections of infusion port (most recently with oral Streptococcus spp.)  5. 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 Malnutrition, with possible nutritional cardiomyopathy  7. Chronic abdominal pain, on longstanding opioids  8. Multiple antibiotic allergies (Sulfa, Penicillins, Doxycycline)    Recommendations:  1. Continue Vancomycin and Ceftriaxone as ordered. Plan to complete a 14 day course of both antibiotics.   1. Infusion modifications/premedications may need to be done, given prior Red Man Syndrome reaction with Vancomycin  2. Would recommend prompt removal of the right upper chest port- Done today.  3. Suggest changing out the PEG tube apparatus given the longevity and possible local irritation (? Folliculitis)    Discussion:  47 year old man with port-dependent TPN and multiple prior port infections. Avenue of recurrent infections with his port is unclear, however some possibility that they could be contaminated, either by lack of altogether fastidious technique (which seems doubtful given he and his wife's description of the processes and safeguards) or seeding from elsewhere (perceived as less likely).     Of note, however, this port has been accessed, per patient, for the past several day. Furthermore, his G-tube apparatus is apparently overdue, per the patient, for maintenance. Irritation is evident at the site, but  "is not wholly consistent with a cellulitis/abscess such that we would readily expect it to be the source in this case.     Nonetheless, presence of Gram negative organisms as drawn from the port, in combination with an Enterococcus spp, should prompt removal and eventual replacement of the device once clear of any bacteremias. His current coverage is adequate for organisms thus far identified.     Kayy Cheek MD  ID staff physician  Pager [phone]    1/15/18 INTERVAL HISTORY: Patient had port-o-cath removed today. No new complaints. Tolerating current IV antibiotics OK. Has h/o PCN allergy.          History of Present Illness:     History obtained by: patient, his wife    46 YO man with longstanding reliance on TPN as a consequence of short gut syndrome following KEYA gastric bypass is admitted to the hospital after being called back when a port culture showed growth of E coli. Since that time, he has also had evidence of an Enterococcus in his blood via Verigene. ID is asked to evaluate in this context. He had been started on Ceftriaxone overnight, per my suggestion, after being called by the ED once his E coli was positive by Verigene (CTX-M negative).     I met the patient and his wife in his room on 7B. He is in good humor, but irritated by the lack of longevity of this port, noting that \"it's only been a month!\". Port had been fitted 12/19/2017; last infection had been in 9/2017, attributable to various oral Streptococci, and followed by ID at that time as well.     He and his wife have recently been moving to new housing, and he has been somewhat inconsistent with his TPN use, and moreover was using a peripheral IV line for a time for TPN feedings. When accessing the port, he noticed immediately afterwards that he began to feel poorly, characterized as fevers, chills, shaking, and awful back pain such that he encouraged his wife to call 911. Pain described as low in the back, \"on the inside\", non-radiating, " central, and very sharp, not particularly alleviated by anything. This has since resolved. Despite being sent home from the ED, he reports to me that he was confident, based on prior experiences, that this constellation of experiences represented yet another port infection, as it occurred again on the second day.     He had not noted any preceding redness, pain, rash, tenderness, or irritation at the port site. I am shown images on his wife's phone of prior port irritation, and none of these were seen in recent weeks.     Today he denies significant fever, chills, shaking, sweats, nausea, vomiting, chest pain, dyspnea, abdominal pain, back pain, joint pain, or URI symptoms. Both he and his wife describe the process of port access, noting that they have an entire room now devoted to this, with meticulous application of sterile process. His G tube, per report, is overdue for replacement, but since he only uses it for venting and did not want to drive ~2.5 hours for a separate appointment, he has not yet had this done. Occasional surrounding skin irritation managed by topical cream.     Recent culture results include:  Culture Micro   Date Value Ref Range Status   01/14/2018 No growth after 1 day  Preliminary   01/14/2018 No growth after 1 day  Preliminary   01/13/2018 (A)  Preliminary    Cultured on the 1st day of incubation:  Enterococcus faecalis  Susceptibility testing done on previous specimen     01/13/2018 Gram negative rods (A)  Preliminary   01/13/2018   Preliminary    Critical Value/Significant Value, preliminary result only, called to and read back by  Taylor Bhatt RN from [facility]. 1.14.18 at 0111. GR.     01/13/2018 (A)  Preliminary    Cultured on the 1st day of incubation:  Enterococcus faecalis  Susceptibility testing done on previous specimen     01/13/2018   Preliminary    Critical Value/Significant Value, preliminary result only, called to and read back by  Shorty Montgomery RN at 1953 on 01.13.18 by stephanie      01/12/2018 (A)  Final    Cultured on the 1st day of incubation:  Escherichia coli  Susceptibility testing done on previous specimen     01/12/2018 (A)  Final    Cultured on the 1st day of incubation:  Enterococcus faecalis     01/12/2018   Final    Critical Value/Significant Value, preliminary result only, called to and read back by  Shorty Gardner RN 7B @ 0836. 01/13/18 01/12/2018   Final    (Note)  POSITIVE for ENTEROCOCCUS FAECALIS and NEGATIVE for Melvin/vanB genes  by One2startigene multiplex nucleic acid test. Final identification and  antimicrobial susceptibility testing will be verified by standard  methods.    Specimen tested with Verigene multiplex, gram-positive blood culture  nucleic acid test for the following targets: Staph aureus, Staph  epidermidis, Staph lugdunensis, other Staph species, Enterococcus  faecalis, Enterococcus faecium, Streptococcus species, S. agalactiae,  S. anginosus grp., S. pneumoniae, S. pyogenes, Listeria sp., mecA  (methicillin resistance) and Melvin/B (vancomycin resistance).    Critical Value/Significant Value called to and read back by Shorty GAVIRIA @ 1129. 01/13/18                Current Medications (antimicrobials listed in bold):       vitamin D  50,000 Units Oral Q7 Days     fentaNYL  50 mcg Transdermal Q48H     Lidocaine  1-2 patch Transdermal Q24H     mupirocin   Topical TID     sucralfate  1 g Oral 4x Daily     cefTRIAXone  1 g Intravenous Q24H     amphetamine-dextroamphetamine  20 mg Oral BID     fentaNYL   Transdermal Q72H     fentaNYL   Transdermal Q8H     lidocaine   Transdermal Q24H     lidocaine   Transdermal Q8H     carvedilol  12.5 mg Oral BID     multivitamins with minerals  15 mL Oral Daily     nystatin   Topical BID     triamcinolone   Topical BID     vancomycin (VANCOCIN) IV  1,750 mg Intravenous Q12H     lipids  250 mL Intravenous Q24H     diphenhydrAMINE  25 mg Oral BID            Review of Systems:     ROS as above in HPI; all other systems,  complete 10 point ROS queried, reviewed, and negative.        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)      Chronic abdominal pain      Difficulty swallowing      Gastric ulcer, unspecified as acute or chronic, without mention of hemorrhage, perforation, or obstruction      Gastro-oesophageal reflux disease      Head injury      Hiatal hernia      Other bladder disorder      Other chronic pain      Severe malnutrition (H)     TPN     Short gut syndrome      Tobacco abuse           Past Surgical History:     Past Surgical History:   Procedure Laterality Date     APPENDECTOMY       BACK SURGERY  11/3/2014    curve in the spine     BIOPSY LYMPH NODE CERVICAL N/A 2/20/2015    Procedure: BIOPSY LYMPH NODE CERVICAL;  Surgeon: Baron Scanlon MD;  Location: [facility]     C GASTRIC BYPASS,OBESE<100CM SHAYLEE-EN-Y  2002    lost 300 pounds     CHOLECYSTECTOMY       DISCECTOMY, FUSION CERVICAL ANTERIOR ONE LEVEL, COMBINED N/A 2/15/2017    Procedure: COMBINED DISCECTOMY, FUSION CERVICAL ANTERIOR ONE LEVEL;  Surgeon: Darren Campos MD;  Location: [facility]     ENDOSCOPIC INSERTION TUBE GASTROSTOMY  9/9/2013    Procedure: ENDOSCOPIC INSERTION TUBE GASTROSTOMY;;  Surgeon: Francis Vyas MD;  [facility]     ENDOSCOPIC ULTRASOUND UPPER GASTROINTESTINAL TRACT (GI)  4/29/2011    Procedure:ENDOSCOPIC ULTRASOUND UPPER GASTROINTESTINAL TRACT (GI); Both Procedures done Conjointly; Surgeon:NEREIDA HOUSER; [facility]     ENDOSCOPIC ULTRASOUND UPPER GASTROINTESTINAL TRACT (GI)  9/9/2013    Procedure: ENDOSCOPIC ULTRASOUND UPPER GASTROINTESTINAL TRACT (GI);  Endoscopic Ultrasound Guide Gastrostomy Tube Placement  C-arm;  Surgeon: Noe Lizarraga MD;  [facility]     ENDOSCOPY   03/25/11    EGD, [facility]     ENDOSCOPY  08/04/09    Upper Endoscopy, [facility]     ENDOSCOPY  01/05/09    Upper Endoscopy, [facility]     ESOPHAGOSCOPY, GASTROSCOPY, DUODENOSCOPY (EGD), COMBINED  4/20/2011    Procedure:COMBINED ESOPHAGOSCOPY, GASTROSCOPY, DUODENOSCOPY (EGD); Surgeon:BLU VYAS; [facility]     ESOPHAGOSCOPY, GASTROSCOPY, DUODENOSCOPY (EGD), COMBINED  6/15/2011    Procedure:COMBINED ESOPHAGOSCOPY, GASTROSCOPY, DUODENOSCOPY (EGD); Surgeon:BLU VYAS; [facility]     ESOPHAGOSCOPY, GASTROSCOPY, DUODENOSCOPY (EGD), COMBINED  6/12/2013    Procedure: COMBINED ESOPHAGOSCOPY, GASTROSCOPY, DUODENOSCOPY (EGD);;  Surgeon: Blu Vyas MD;  [facility]     ESOPHAGOSCOPY, GASTROSCOPY, DUODENOSCOPY (EGD), COMBINED  11/22/2013    Procedure: COMBINED ESOPHAGOSCOPY, GASTROSCOPY, DUODENOSCOPY (EGD);;  Surgeon: Blu Vyas MD;  [facility]     ESOPHAGOSCOPY, GASTROSCOPY, DUODENOSCOPY (EGD), COMBINED  4/30/2014    Procedure: COMBINED ESOPHAGOSCOPY, GASTROSCOPY, DUODENOSCOPY (EGD);  Surgeon: Blu Vyas MD;  [facility]     ESOPHAGOSCOPY, GASTROSCOPY, DUODENOSCOPY (EGD), COMBINED N/A 2/20/2015    Procedure: COMBINED ESOPHAGOSCOPY, GASTROSCOPY, DUODENOSCOPY (EGD), BIOPSY SINGLE OR MULTIPLE;  Surgeon: Baron Scanlon MD;  Location:  OR     ESOPHAGOSCOPY, GASTROSCOPY, DUODENOSCOPY (EGD), COMBINED N/A 9/30/2015    Procedure: COMBINED ESOPHAGOSCOPY, GASTROSCOPY, DUODENOSCOPY (EGD);  Surgeon: Blu Vyas MD;  [facility]     GASTRECTOMY  6/22/2012    Procedure: GASTRECTOMY;  Open Approach, Excise Ulcers,Partial Gastrectomy, Esophagojejunostomy, Hiatal Hernia Repair, Extensive Lysis of Adhesions and Esaphagogastrodudenoscopy.;  Surgeon: Blu Vyas MD;  [facility]     GASTROJEJUNOSTOMY  08/26/09    Extensice enterolysis, partial resect. jejunum, part. resect gastric pouch, gastrojejunostomy  anastomosis     HC ESOPH/GAS REFLUX TEST W NASAL IMPED ELECTRODE  8/5/2013    Procedure: ESOPHAGEAL IMPEDENCE FUNCTION TEST 1 HOUR OR LESS;  Surgeon: Halie Lang MD;  [facility]     HEAD & NECK SURGERY  2/15/2017    C5-C6     HERNIA REPAIR  2006    Umbilical hernia     HERNIORRHAPHY HIATAL  6/22/2012    Procedure: HERNIORRHAPHY HIATAL;;  Surgeon: Francis Vyas MD;  [facility]     HERNIORRHAPHY INGUINAL  11/22/2013    Procedure: HERNIORRHAPHY INGUINAL;;  Surgeon: Francis Vyas MD;  [facility]     INSERT PORT VASCULAR ACCESS Right 12/19/2017    Procedure: INSERT PORT VASCULAR ACCESS;  Right Chest Port Placement ;  Surgeon: Lisandro Alejandro PA-C;  [facility]     LAPAROTOMY EXPLORATORY  11/22/2013    Procedure: LAPAROTOMY EXPLORATORY;  Exploratory Laparotomy, Upper Endoscopy, Left Inguinal Hernia Repair;  Surgeon: Francis Vyas MD;  [facility]     PICC INSERTION Right 03/16/2017    5fr DL BioFlo PICC, 42cm (3cm external) in the R medial brachial vein w/ tip in the SVC RA junction.     PICC INSERTION Left 09/23/2017    5fr DL BioFlo PICC, 45cm (1cm external) in the L basilic vein w/ tip in the SVC RA junction.     SHAYLEE EN Y BOWEL  2003     SOFT TISSUE SURGERY       SOFT TISSUE SURGERY       TONSILLECTOMY            Allergies:      Allergies   Allergen Reactions     Bactrim [Sulfamethoxazole W/Trimethoprim] Rash     Penicillins Anaphylaxis     Doxycycline Rash     Vancomycin Rash     Rash after receiving vancomycin 3/28/16 (red man's?). Tolerated with slower infusion and diphenhydramine premed.          Family History:   Reviewed and noncontributory  Family History   Problem Relation Age of Onset     GASTROINTESTINAL DISEASE Mother      Crohns disease     Anxiety Disorder Mother      Thyroid Disease Mother      Grave's disease     CANCER Father      ear cancer-skin cancer/melanoma     Breast Cancer Maternal Grandmother      DIABETES Maternal Uncle      Breast  Cancer Other      Hypertension No family hx of      Hyperlipidemia No family hx of      CEREBROVASCULAR DISEASE No family hx of      Prostate Cancer No family hx of      Depression No family hx of      Anesthesia Reaction No family hx of      Asthma No family hx of      OSTEOPOROSIS No family hx of      Genetic Disorder No family hx of      Obesity No family hx of      MENTAL ILLNESS No family hx of      Substance Abuse No family hx of           Social History:   Reviewed and noncontributory, except for what is noted in the HPI  Social History     Social History     Marital status:      Spouse name: Rose     Number of children: 1     Years of education: N/A     Occupational History     Not on file.     Social History Main Topics     Smoking status: Current Some Day Smoker     Packs/day: 0.10     Years: 3.00     Types: Cigarettes     Smokeless tobacco: Current User      Comment: I use an e cig every now and than     Alcohol use No      Comment: quit      Drug use: No     Sexual activity: Yes     Partners: Female     Birth control/ protection: None      Comment: no protection     Other Topics Concern     Parent/Sibling W/ Cabg, Mi Or Angioplasty Before 65f 55m? No     Social History Narrative     History   Sexual Activity     Sexual activity: Yes     Partners: Female     Birth control/ protection: None     Comment: no protection            Physical Exam:   Vitals were reviewed  Patient Vitals for the past 24 hrs:   BP Temp Temp src Pulse Heart Rate Resp SpO2   01/15/18 2011 119/74 98.6  F (37  C) Oral - 68 18 96 %   01/15/18 1654 121/75 99.1  F (37.3  C) Oral - 74 16 97 %   01/15/18 1509 - - - - 58 16 98 %   01/15/18 1503 115/79 - - - 58 14 98 %   01/15/18 1459 - - - - 60 14 100 %   01/15/18 1452 113/68 - - - 58 12 99 %   01/15/18 1443 112/74 - - - 44 12 98 %   01/15/18 1437 - - - - 60 16 98 %   01/15/18 1433 121/75 - - - 46 18 97 %   01/15/18 1412 122/83 - - - 53 18 98 %   01/15/18 1402 - - - - - 16 -    01/15/18 1350 - - - - - 16 -   01/15/18 1228 134/90 97.3  F (36.3  C) Oral 70 - 16 100 %   01/15/18 0741 130/82 - - 60 - 16 100 %   01/14/18 2210 110/65 96.3  F (35.7  C) Oral 58 - 16 97 %     Ranges for his vital signs:  Temp:  [96.3  F (35.7  C)-99.1  F (37.3  C)] 98.6  F (37  C)  Pulse:  [58-70] 70  Heart Rate:  [44-74] 68  Resp:  [12-18] 18  BP: (110-134)/(65-90) 119/74  SpO2:  [96 %-100 %] 96 %    Intake/Output Summary (Last 24 hours) at 01/13/18 1051  Last data filed at 01/13/18 0546   Gross per 24 hour   Intake             1000 ml   Output                0 ml   Net             1000 ml       Physical Exam:  Gen: pleasant man upright in bed receiving IV infusion, awake, alert, conversant, in NAD  HEENT: EOMI, PERRLA, MMM, no oral lesions  Neck: nontender, no adenopathy  Chest: nontender   Port in place on right chest; accessed, under older-appearing dressing  Cardiac: RRR without adventitious heart sounds  Lungs: clear in all fields A/P  Abd: soft, skin folds present, nontender on bedside exam, faint bowel sounds throughout, some striae  Back: no midline or paraspinal tenderness appreciated   Negative CVA percussion  Lymph: no appreciable adenopathy  Skin: warm, dry   G-tube site has follicular-type erythema and redness inferolateral to entry site   No expressable purulence is seen from G-tube site  Neuro: nonfocal exam  Psych: appropriate, A&O x 4         Laboratory Data:   Iron Testing    Recent Labs   Lab Test  01/15/18   0801   08/13/17   1900   02/10/17   1520   04/15/16   0848   01/06/15   1325  11/03/14   1355   05/20/14   1252   IRON   --    --   22*   --   50   < >   --    < >  87   --    --   69   FEB   --    --    --    --    --    --    --    --   281   --    --   329   IRONSAT   --    --    --    --    --    --    --    --   31   --    --   21   BROOKLYNN   --    --   10*   --   33   < >   --    < >   --   55   --   19*   MCV  95   < >  90   < >  94   < >  94   < >  93  94   < >  90   FOLIC   --    --     --    --    --    --   17.0   --    --    --    --    --    B12   --    --    --    --    --    --   624   --    --   420   --    --     < > = values in this interval not displayed.       Inflammatory Markers    Recent Labs   Lab Test  01/15/18   0801 01/14/18   0643  01/13/18   0923  01/12/18   2323  10/08/17   1300  10/02/17   1030  09/24/17   1900  09/23/17   0728   06/28/17   2222  03/12/17   0351   11/01/16   1530   04/20/16   1530  04/11/16   1842   01/16/16   0224  08/05/15   0022   SED   --    --    --    --    --    --   31*   --    --   26*  17*   --   27*   --   34*  11   --   29*  12   CRP  45.0*  74.0*  110.0*  110.0*  <2.9  <2.9  26.6*  48.0*   < >  53.0*  3.4   < >  8.4*   < >  12.3*  80.0*   < >  70.0*  7.3    < > = values in this interval not displayed.       Hematology Studies    Recent Labs   Lab Test  01/15/18   0801 01/14/18 0643  01/13/18 0923  01/12/18 2323 01/12/18   0005  12/28/17   1430   11/16/17   1130  10/17/17   1230  10/08/17   1300   WBC  6.9  6.3  11.3*  12.4*  3.6*  6.6   < >  9.2  6.9  5.2   ANEU   --    --    --   8.5*  3.2  4.0   --   5.4  3.6  2.7   AEOS   --    --    --   0.4  0.1  0.2   --   0.3  0.3  0.2   HGB  12.2*  11.4*  12.7*  12.8*  12.3*  13.6   < >  13.1*  13.1*  12.2*   MCV  95  95  97  95  95  97   < >  96  94  91   PLT  109*  77*  79*  76*  70*  141*   < >  153  142*  130*    < > = values in this interval not displayed.       Immune Globulin Studies  No lab results found.    Metabolic Studies     Recent Labs   Lab Test  01/15/18   0801  01/14/18   0643  01/13/18   0923  01/12/18 2323  01/12/18   0005   NA  145*  146*  143  140  141   POTASSIUM  3.4  3.5  3.2*  3.0*  2.8*   CHLORIDE  112*  112*  110*  108  110*   CO2  27  28  25  25  22   BUN  8  11  17  21  11   CR  0.62*  0.71  0.84  1.05  0.77   GFRESTIMATED  >90  >90  >90  76  >90       Hepatic Studies    Recent Labs   Lab Test  01/15/18   0801  01/14/18   0643  01/12/18   2323  01/12/18   0005   12/28/17   1430  12/18/17   1435   BILITOTAL  0.4  0.4  0.5  0.7  0.4  0.4   ALKPHOS  81  78  91  112  69  77   ALBUMIN  3.0*  2.7*  3.3*  3.1*  3.7  3.4   AST  12  8  24  27  13  26   ALT  36  37  55  50  25  27       Thyroid Studies    Recent Labs   Lab Test  11/03/14   1355  05/20/14   1252   TSH  0.96  1.39       Microbiology:  Culture Micro   Date Value Ref Range Status   01/14/2018 No growth after 1 day  Preliminary   01/14/2018 No growth after 1 day  Preliminary   01/13/2018 (A)  Preliminary    Cultured on the 1st day of incubation:  Enterococcus faecalis  Susceptibility testing done on previous specimen     01/13/2018 Gram negative rods (A)  Preliminary   01/13/2018   Preliminary    Critical Value/Significant Value, preliminary result only, called to and read back by  Taylor Bhatt RN from [facility]. 1.14.18 at 0111. GR.     01/13/2018 (A)  Preliminary    Cultured on the 1st day of incubation:  Enterococcus faecalis  Susceptibility testing done on previous specimen     01/13/2018   Preliminary    Critical Value/Significant Value, preliminary result only, called to and read back by  Shorty Montgomery RN at 1953 on 01.13.18 by mp     01/12/2018 (A)  Final    Cultured on the 1st day of incubation:  Escherichia coli  Susceptibility testing done on previous specimen     01/12/2018 (A)  Final    Cultured on the 1st day of incubation:  Enterococcus faecalis     01/12/2018   Final    Critical Value/Significant Value, preliminary result only, called to and read back by  Shorty Gardner RN 7B @ 0836. 01/13/18 01/12/2018   Final    (Note)  POSITIVE for ENTEROCOCCUS FAECALIS and NEGATIVE for Melvin/vanB genes  by Verigene multiplex nucleic acid test. Final identification and  antimicrobial susceptibility testing will be verified by standard  methods.    Specimen tested with Verigene multiplex, gram-positive blood culture  nucleic acid test for the following targets: Staph aureus, Staph  epidermidis, Staph lugdunensis, other Staph  species, Enterococcus  faecalis, Enterococcus faecium, Streptococcus species, S. agalactiae,  S. anginosus grp., S. pneumoniae, S. pyogenes, Listeria sp., mecA  (methicillin resistance) and Melvin/B (vancomycin resistance).    Critical Value/Significant Value called to and read back by Shorty Gardner RN 7B @ 1129.cg 01/13/18 01/12/2018 No growth after 2 days  Preliminary   01/12/2018 No growth after 3 days  Preliminary   01/12/2018 (A)  Final    Cultured on the 1st day of incubation:  Escherichia coli     01/12/2018   Final    Critical Value/Significant Value, preliminary result only, called to and read back by  Dr Jeet Orozco  in the [facility] at 8:20am 1/12/2018 ()     01/12/2018   Final    (Note)  POSITIVE for E.COLI by Verigene multiplex nucleic acid test. Final  identification and antimicrobial susceptibility testing will be  verified by standard methods. Verigene test will not distinguish  E.coli from Shigella species including S.dysenteriae, S.flexneri,  S.boydii, and S.sonnei. Specimens containing Shigella species or  E.coli will be reported as Positive for E.coli.    Specimen tested with Verigene multiplex, gram-negative blood culture  nucleic acid test for the following targets: Acinetobacter sp.,  Citrobacter sp., Enterobacter sp., Proteus sp., E. coli, K.  pneumoniae/oxytoca, P. aeruginosa, and the following resistance  markers: CTXM, KPC, NDM, VIM, IMP and OXA.    Critical Value/Significant Value called to and read back by Dr. Jeet Orozco at 1042 on 1.12.18.KD     09/24/2017 No growth  Final   09/24/2017 No growth  Final   09/23/2017 No growth  Final   09/23/2017 No growth  Final   09/22/2017 No growth  Final   09/22/2017 No growth  Final   09/22/2017 No growth  Final   09/21/2017 No growth  Final   09/21/2017 (A)  Final    Cultured on the 1st day of incubation:  Streptococcus mitis group  Identification obtained by MALDI-TOF mass spectrometry research use only database. Test   characteristics  determined and verified by the [facility]   ([facility]) [place], [place].     09/21/2017   Final    Critical Value/Significant Value, preliminary result only, called to and read back by  Nathaly Yo RN on 9/22/2017 @2011, tk     09/21/2017 (A)  Final    Cultured on the 1st day of incubation:  Streptococcus salivarius  Susceptibility testing done on previous specimen     09/21/2017   Final    Critical Value/Significant Value, preliminary result only, called to and read back by  Karine Meyers RN [facility] 0400 09.22.17 CF     09/21/2017 (A)  Final    Cultured on the 1st day of incubation:  Rothia mucilaginosa  Susceptibility testing done on previous specimen     09/21/2017 (A)  Final    Cultured on the 1st day of incubation:  Staphylococcus epidermidis     09/21/2017 No growth  Final   09/21/2017 No growth  Final   09/20/2017 No growth  Final   09/20/2017 No growth  Final   09/19/2017 No growth  Final   09/19/2017 (A)  Preliminary    Cultured on the 2nd day of incubation:  Streptococcus salivarius  Susceptibility testing done on previous specimen     09/19/2017   Preliminary    Critical Value/Significant Value, preliminary result only, called to and read back by  Annel Hirsch RN at 0814 on 9.20.17.KD      09/19/2017 (A)  Preliminary    Cultured on the 2nd day of incubation:  Gram positive cocci in clusters  Susceptibility testing done on previous specimen     09/19/2017 Referred to research section for identification  Preliminary   09/19/2017 (A)  Final    Cultured on the 1st day of incubation:  Streptococcus salivarius     09/19/2017   Final    Critical Value/Significant Value, preliminary result only, called to and read back by  Dr. Jimenez, from [facility]. 09.19.17 at 1357. GR.     09/19/2017 (A)  Final    Cultured on the 1st day of incubation:  Streptococcus salivarius  Susceptibility testing done on previous specimen     09/19/2017   Final    Critical Value/Significant Value, preliminary result only,  called to and read back by  Dr. Jimenez from [facility]. 09.19.17 at 1041. GR.     09/19/2017 (A)  Final    Cultured on the 1st day of incubation:  Streptococcus mitis group     09/19/2017 (A)  Final    Cultured on the 1st day of incubation:  Rothia mucilaginosa     09/19/2017   Final    Critical Value/Significant Value called to and read back by  KACIE Rose RN on 9.23.17 at 1101. bw     09/19/2017   Final    (Note)  NEGATIVE for the following: Staphylococcus spp., Staph aureus, Staph  epidermidis, Staph lugdunensis, Streptococcus spp., Strep pneumoniae,  Strep pyogenes, Strep agalactiae, Strep anginosus group, Enterococcus  faecalis, Enterococcus faecium, and Listeria spp. by Verigene  multiplex nucleic acid test. Final identification and antimicrobial  susceptibility testing will be verified by standard methods.    Critical Value/Significant Value called to and read back by Dr. Jimenez from [facility] 09.19.17 at 1357. GR.     08/01/2017 No growth  Final   06/28/2017 No growth  Final   06/28/2017 No growth  Final   06/26/2017 No growth  Final   06/26/2017 (A)  Final    Cultured on the 1st day of incubation: Streptococcus salivarius group  Cultured on the 1st day of incubation: Staphylococcus epidermidis  Critical Value/Significant Value, preliminary result only, called to and read   back by  Dr. Francis Vyas @1652 6/27/17. ct  Cultured on the 1st day of incubation: Rothia mucilaginosa  (Note)  POSITIVE for STAPHYLOCOCCUS EPIDERMIDIS and POSITIVE for the mecA  gene (resistant to methicillin) by Verigene multiplex nucleic acid  test. Final identification and antimicrobial susceptibility testing  will be verified by standard methods.    Specimen tested with Verigene multiplex, gram-positive blood culture  nucleic acid test for the following targets: Staph aureus, Staph  epidermidis, Staph lugdunensis, other Staph species, Enterococcus  faecalis, Enterococcus faecium, Streptococcus species, S. agalactiae,  S. anginosus grp., S.  pneumoniae, S. pyogenes, Listeria sp., mecA  (methicillin resistance) and Melvin/B (vancomycin resistance).    Critical Value/Significant Value called to and read back by Dr. Vyas @ 1942 6/27/17        04/11/2017 No growth  Final   04/09/2017 No growth  Final   04/09/2017 No growth  Final   03/12/2017 No growth  Final   03/12/2017 No growth  Final   03/08/2017 No growth  Final   03/07/2017 No growth  Final   03/07/2017 No growth  Final   10/30/2016 No growth  Final   10/29/2016 No growth  Final   10/28/2016 No growth  Final   10/27/2016 15 to 50 colonies Staphylococcus epidermidis (A)  Final   10/27/2016 (A)  Final    Cultured on the 1st day of incubation: Staphylococcus epidermidis Susceptibility   testing done on previous specimen  Critical Value/Significant Value, preliminary result only, called to and read   back by Shahrzad Long RN at 0028 10.28.16.DK     10/27/2016 No growth  Final   10/26/2016 (A)  Final    Cultured on the 1st day of incubation: Staphylococcus epidermidis Susceptibility   testing done on previous specimen  Critical Value/Significant Value called to and read back by Soraida Stafford RN U[facility]   10/27/16 0136 CF     10/26/2016 (A)  Final    Cultured on the 1st day of incubation: Staphylococcus epidermidis Susceptibility   testing done on previous specimen  Critical Value/Significant Value, preliminary result only, called to and read   back by Karis Nicole RN at 1553 on 10.26.16.KD     10/25/2016 (A)  Final    Cultured on the 1st day of incubation: Staphylococcus epidermidis Susceptibility   testing done on previous specimen  Critical Value/Significant Value, preliminary result only, called to and read   back by Tanya Acosta, CHRISTY 0258 10/26/16. MS     10/25/2016 (A)  Final    Cultured on the 1st day of incubation: Staphylococcus epidermidis Susceptibility   testing done on previous specimen  Critical Value/Significant Value, preliminary result only, called to and read   back by  Goldie Serna  RN on 5A, 10/26/16 @ 1359      10/24/2016 (A)  Final    Cultured on the 1st day of incubation: Staphylococcus epidermidis Susceptibility   testing done on previous specimen  Critical Value/Significant Value, preliminary result only, called to and read   back by  SREE HERNANDES CH RN (5A). 10.25.16 2204 GJS     10/24/2016 (A)  Final    Cultured on the 1st day of incubation: Staphylococcus epidermidis  Critical Value/Significant Value called to and read back by Jyothi Goins RN   [facility] 10/25/16 at 0550 CF  (Note)  POSITIVE for STAPHYLOCOCCUS EPIDERMIDIS and NEGATIVE for the mecA  gene (not resistant to methicillin) by Verigene nucleic acid test.  The mecA gene was not detected. Final identification and  antimicrobial susceptibility testing will be verified by standard  methods.    Specimen tested with Verigene multiplex, gram-positive blood culture  nucleic acid test for the following targets: Staph aureus, Staph  epidermidis, Staph lugdunensis, other Staph species, Enterococcus  faecalis, Enterococcus faecium, Streptococcus species, S. agalactiae,  S. anginosus grp., S. pneumoniae, S. pyogenes, Listeria sp., mecA  (methicillin resistance) and Melvin/B (vancomycin resistance).    Critical Value/Significant Value called to and read back by Shruthi Salas RN 10.25.16 at 0823am NK       08/10/2016 (A)  Final    Light growth Coagulase negative Staphylococcus  Susceptibility testing not routinely done     08/05/2016 No growth  Final   08/05/2016 No growth  Final   04/15/2016 No growth  Final   04/15/2016 No growth  Final   04/14/2016 No growth  Final   04/13/2016   Final    Canceled, Test credited Test reordered as correct code REORDERED AS A YEAST   CULTURE     04/13/2016 <15 colonies  Candida albicans (A)  Final   04/13/2016 No growth  Final   04/13/2016 No growth  Final   04/12/2016 No growth  Final   04/12/2016 (A)  Final    Cultured on the 4th day of incubation: Candida albicans  Critical Value/Significant Value,  preliminary result only, called to and read   back by Dr. Godwin Egan at 0750 on 4.16.16, CN.     04/11/2016 (A)  Final    Cultured on the 2nd day of incubation: Candida albicans  Critical Value/Significant Value, preliminary result only, called to and read   back by Bartolo Oliver RN at 1713 on 4.12.16.KD     04/11/2016 (A)  Final    Cultured on the 2nd day of incubation: Candida albicans  Critical Value/Significant Value, preliminary result only, called to and read   back by Masood Ayala RN 0159 4/13/16. MS  Susceptibility testing done on previous specimen     04/11/2016 No growth  Final   04/10/2016   Final    <10,000 colonies/mL mixed urogenital deepa  Susceptibility testing not routinely done     04/10/2016 (A)  Final    Cultured on the 1st day of incubation: Candida albicans  Critical Value/Significant Value, preliminary result only, called to and read   back by Dr. MURRAY 4.11.2016 @ 1445      03/29/2016 No growth  Final   03/29/2016 No growth  Final   03/28/2016 No growth  Final   03/28/2016 No growth  Final   01/17/2016 No growth  Final   01/17/2016 No growth  Final   01/16/2016 No growth  Final   01/16/2016 No growth  Final   01/14/2016 No growth  Final   01/14/2016 (A)  Final    Cultured on the 1st day of incubation: Staphylococcus epidermidis This isolate   DOES NOT demonstrate inducible clindamycin resistance in vitro.  Critical Value/Significant Value, preliminary result only, called to and read   back by  Estelle Scott (1/15/16 @ 2052)   (Note)  POSITIVE for STAPHYLOCOCCUS EPIDERMIDIS and NEGATIVE for the mecA  gene (not resistant to methicillin) by Verigene nucleic acid test.  The mecA gene was not detected. Final identification and  antimicrobial susceptibility testing will be verified by standard  methods.    Specimen tested with Verigene multiplex, gram-positive blood culture  nucleic acid test for the following targets: Staph aureus, Staph  epidermidis, Staph lugdunensis,  other Staph species, Enterococcus  faecalis, Enterococcus faecium, Streptococcus species, S. agalactiae,  S. anginosus grp., S. pneumoniae, S. pyogenes, Listeria sp., mecA  (methicillin resistance) and Melvin/B (vancomycin resistance).    Critical Value/Significant Value called to and read back by Dr. Estelle Alves on 1.15.16 at 2334. KG     12/01/2015 No growth  Final   12/01/2015 No growth  Final       Urine Studies    Recent Labs   Lab Test  01/12/18   0042  09/19/17   0242  08/01/17   1133  06/28/17   2339  10/25/16   1759   LEUKEST  Negative  Negative  Negative  Negative  Negative   WBCU  <1  2  O - 2  0  4*       Vancomycin Levels    Recent Labs   Lab Test  09/24/17   1900  09/22/17   0939  09/21/17   0849   VANCOMYCIN  17.6  16.8  8.3       Serostatus:  No results found for: CMVG, CMIGG, CMIG, CMIM, CMVIGM, CMVM, CMLTX, HSVG1, HSVG2, HSVTP1, KA3899, HS12M, HS12GR, HS1GR, HS2GR, HERPES, HSIM, HSIG, HSIGR, HSVIGMAB, HSVG1, VARICZOSAB, EBVIGG, EBIGG, EBVAGN, ER7199  No results found for: EBIG2, EBIGM, TOXG  No lab results found.    Invalid input(s): HSV12, VZVG, KDJ240    Toxoplasma Testing  No lab results found.    Invalid input(s): TOXPL, TOXABM, TOXPCR    Virology:  CMV viral loads  No lab results found.  No results found for: CMQNT, CMVQ  EBV viral loads   No lab results found.  No results found for: EBVDN, EBRES, EBVDN, EBVSP, EBVPC, EBVPCR  BK viral loads No lab results found.    Invalid input(s): BKDN, BKVPC, BKVPCR  HSV testing  No lab results found.    Hepatitis B Testing   Recent Labs   Lab Test  09/20/17   0549   HEPBANG  Nonreactive     Hepatitis C Testing     Hepatitis C Antibody   Date Value Ref Range Status   09/20/2017 Nonreactive NR^Nonreactive Final     Comment:     Assay performance characteristics have not been established for newborns,   infants, and children       Respiratory Virus Testing    No results found for: RS, FLUAG     Autoimmune Testing   No lab results found.    Invalid  input(s): MPO, PRO3, C3, C4, VASPAN         Imaging:     Reviewed all relevant imaging for this consult patient as of 01/15/2018     Recent Results (from the past 48 hour(s))   Chest XR,  PA & LAT    Narrative    Exam:  Chest radiograph, 1/12/2018 2:56 AM     History: Fever    Comparison:  9/19/2017    Findings:  PA and lateral view of the chest. Right-sided Port-A-Cath  with tip projecting over the low SVC. The cardiomediastinal silhouette  is unremarkable. No pleural effusions are present. There are new  poorly defined consolidations at the lung bases, right greater than  left confirmed on the lateral view. Upper abdominal bowel gas pattern  is normal. Cervical fixation is present.      Impression    Impression: Developing right lower lobe and possible left lower lobe  pneumonias.    I have personally reviewed the examination and initial interpretation  and I agree with the findings.    HILDA MCCAIN MD

## 2018-01-16 NOTE — PLAN OF CARE
Problem: Patient Care Overview  Goal: Plan of Care/Patient Progress Review  Outcome: No Change  A&Ox4, VSS on RA. Up independently, ambulating the halls & outside frequently for extended periods of time. Abdominal pain managed with PRN oxy. Fentanyl patch on right arm. C/o nausea - PRN Zofran effective. Denies SOB. Previous port site covered with liquid bandage. Pt refused to let RN assess PEG tube. XR of GI to be completed today - per call from XR pt tolerated 2 drinks of contrast & then requested to leave, XR told pt they could come up to unit to finish around 1515/1530. Right PIV SL'd in between IV abx. Voiding without difficult not saving, no BM on this shift. Plan to place tunneled CVC in IR tomorrow, sign & held orders available to be released this evening. Continue to monitor & follow POC.

## 2018-01-16 NOTE — PROGRESS NOTES
[facility], [place]    Internal Medicine Progress Note - Gold Service      Assessment & Plan   Parker Acevedo Sr is a 47 year old male admitted on 1/12/2018. He has a history of multiple abdominal surgeries (RnYGB, lap corey, gastrojejunostomy, gastrectomy, appendectomy) c/b chronic abdominal pain, severe malnutrition, and short gut syndrome now on chronic TPN via port and is admitted for E. Coli Bacteremia.      E. Coli & E. Faecalis Bacteremia likely CLABSI. History of recurrent Port-a-Cath infections most recently exchanged 12/19/17. Previous blood cx positive for strep salivarius and strep mitis (9/21/17), currently peripheral and port blood cx positive 1/12, 1/13 w/ E. Coli (pansensitive) and E. Faecalis (pansensitive). On Ceftriaxone 1/12/18-present and Vancomycin 1/14-present. Remains afebrile with normal white count since 1/13. Most recent BCs x 2 from 1/14 NGTD. CRP continues on downtrend (this am 26; peak of 110 on 1/12). Went to IR yesterday afternoon and had port removed. Port tip sent to lab for culture, which is NGTD.    - ID consulted and appreciate recommendations.   - Continue IV Ceftriaxone and IV Vancomycin (needs PO Benadryl prior) for a total of 14 days.  - F/u results of BCs from 1/14 to ensure continued no growth.   - Place CVC tomorrow in IR if BCs from 1/14 remain NG (see below).   - Trend CBC, CRP     Severe Malnutrition on Chronic TPN. Followed by Dr. Vyas. Due to chronic GI issues (s/p RnYGB, lap corey, gastrojejunostomy, gastrectomy, appendectomy) and short gut syndrome. Has a G tube placed by Dr. Vyas, but only uses to vent. Takes pills PO only if they dissolve or are liquid. On TPN for past 3 years via portacath, which was removed today given recurrent bacteremia and pt no longer candidate to have port replaced per IR, but is candidate for tunneled CVC and now pt agreeable to this as he currently desires to avoid TFs. On schedule in IR tomorrow  "to have CVC placed in order to restart TPN.   - Bariatric surgery consulted and appreciated recommendations (signed off today).  - NPO at midnight for planned CVC placement in IR tomorrow to resume TPN.  - In interim, continue PPN via PIV.   - Also obtain XR upper GI with small bowel follow thru as per bariatric surgery rec's  - Nutrition also consulted and appreciate following. Continue regular diet for comfort. Re-consult them and pharmacy tomorrow to resume TPN after CVC placed.       Chronic Pain Syndrome. Reports lower back pain, abdominal pain. Follows in pain clinic w/ Dr. Milligan - last seen 1/3/18 with recs for following regimen:   - Continue PTA fentanyl, oxycodone 10-15mg q4hrs.   - Would strongly discourage further opioids or benzos     Thrombocytopenia, improving. Previously wnl, currently 116 (109). Has not received Heparin this admission. Possibly due to bacteremia. No s/s bleeding.  - Trend Plts     Hypokalemia, resolved: Pt refused PO replacement and IV replacement \"hurts\". Encouraged IV replacement with lidocaine but patient still refused. Most recent potassium borderline wnl.     Yeast Infection surrounding G tube. Continue BID mycostatin w/ kenalog cream.      JASON, resolved. Baseline Cr 0.7-0.8. Cr on admission 1.05 -->0.84 s/p IVF. Pt is IVF dependent at home, but reports has not used IVF in past week because he was busy moving. Most recent Cr 0.62. Trend BMP      Chronic, Stable Issues.  History of possible cardiomyopathy w/ recovered EF. Lowest EF ~45%, last 55% (9/2017). No signs of sepsis. Continue carvedilol 12.5mg BID  COPD/Asthma. Continue PTA inhaler  GERD/PUD. Continue PTA sucralfate  ADHD. Continue PTA adderall     Diet: parenteral nutrition - Clinimix E (agreed to restart this evening)  Regular Diet Adult  Calorie Counts;   Fluids: None  DVT Prophylaxis: Pneumatic Compression Devices  Code Status: Full Code      Disposition Plan   Expected discharge: 1-2 days, recommended to prior " living arrangement once nutrition plan established and most recent BCs remain no growth .     Entered: Koko Dozier 01/16/2018, 1:08 PM   Information in the above section will display in the discharge planner report.      The patient's care was discussed with the Attending Physician, Dr. Willett and Bedside Nurse.    Best Dozier PA-C  Internal Medicine Hospitalist   [facility]  [phone]       Interval History   No acute events overnight. Denies acute physical concerns at this time including fever, chills, chest pain, SOB, nausea, abd pain, bowel and bladder concerns.        Data reviewed today: I reviewed all medications, new labs and imaging results over the last 24 hours. I personally reviewed Attending Physician, Dr. Willett and Bedside Nurse.    Physical Exam   Vital Signs: Temp: 97.2  F (36.2  C) Temp src: Oral BP: 108/67   Heart Rate: 52 Resp: 18 SpO2: 98 % O2 Device: None (Room air) Oxygen Delivery: 3 LPM  Weight: 186 lbs 0 oz  GEN: In NAD  HEENT: NCAT; PERRL; sclerae non-icteric; edentulous w/ MMM  LUNGS: CTAB  CV: RRR  ABD: +BSs; SNTND; GT intact with no surrounding erythema.   EXT: No BLE edema  SKIN: No acute rashes noted on exposed areas.  NEURO: AAOx3; CNs grossly intact; No acute focal deficits noted.          Data    CMP    Recent Labs  Lab 01/15/18  0801 01/14/18  0643 01/13/18  0923 01/12/18  2323 01/12/18  0005   * 146* 143 140 141   POTASSIUM 3.4 3.5 3.2* 3.0* 2.8*   CHLORIDE 112* 112* 110* 108 110*   CO2 27 28 25 25 22   ANIONGAP 6 6 8 7 9   * 83 78 88 94   BUN 8 11 17 21 11   CR 0.62* 0.71 0.84 1.05 0.77   GFRESTIMATED >90 >90 >90 76 >90   GFRESTBLACK >90 >90 >90 >90 >90   SILAS 8.2* 7.8* 8.0* 7.9* 7.5*   MAG 2.2 2.1 2.0  --   --    PHOS 3.3 3.7 3.2  --   --    PROTTOTAL 6.3* 5.6*  --  6.6* 6.1*   ALBUMIN 3.0* 2.7*  --  3.3* 3.1*   BILITOTAL 0.4 0.4  --  0.5 0.7   ALKPHOS 81 78  --  91 112   AST 12 8  --  24 27   ALT 36 37  --  55 50      CBC    Recent Labs  Lab 01/16/18  0717 01/15/18  0801 01/14/18  0643 01/13/18  0923   WBC 9.0 6.9 6.3 11.3*   RBC 4.18* 3.96* 3.71* 4.03*   HGB 13.0* 12.2* 11.4* 12.7*   HCT 40.2 37.7* 35.4* 39.1*   MCV 96 95 95 97   MCH 31.1 30.8 30.7 31.5   MCHC 32.3 32.4 32.2 32.5   RDW 13.9 14.2 14.4 14.5   * 109* 77* 79*     INR    Recent Labs  Lab 01/15/18  0801 01/14/18  0643 01/12/18  2323   INR 1.08 1.12 1.21*       Lab Results   Component Value Date    CRP 26.0 01/16/2018    CRP 45.0 01/15/2018

## 2018-01-16 NOTE — CONSULTS
Patient is on IR schedule 1/17/2018 for a Single lumen tunneled central line placement .   Labs WNL for procedure.   Orders for NPO, scrubs and antibiotics have been entered.  Consent will be done prior to procedure.  Please contact the IR charge RN at [phone] for estimated time of procedure.     Bree Bright IR RPA  [phone] Call pager  [phone] pager

## 2018-01-17 ENCOUNTER — HOME INFUSION (PRE-WILLOW HOME INFUSION) (OUTPATIENT)
Dept: PHARMACY | Facility: CLINIC | Age: 48
End: 2018-01-17

## 2018-01-17 ENCOUNTER — APPOINTMENT (OUTPATIENT)
Dept: INTERVENTIONAL RADIOLOGY/VASCULAR | Facility: CLINIC | Age: 48
DRG: 314 | End: 2018-01-17
Attending: RADIOLOGY PRACTITIONER ASSISTANT
Payer: COMMERCIAL

## 2018-01-17 VITALS
WEIGHT: 186 LBS | RESPIRATION RATE: 18 BRPM | HEART RATE: 67 BPM | DIASTOLIC BLOOD PRESSURE: 84 MMHG | SYSTOLIC BLOOD PRESSURE: 136 MMHG | OXYGEN SATURATION: 98 % | TEMPERATURE: 95.9 F | HEIGHT: 71 IN | BODY MASS INDEX: 26.04 KG/M2

## 2018-01-17 LAB
ANION GAP SERPL CALCULATED.3IONS-SCNC: 5 MMOL/L (ref 3–14)
BUN SERPL-MCNC: 7 MG/DL (ref 7–30)
CALCIUM SERPL-MCNC: 8.6 MG/DL (ref 8.5–10.1)
CHLORIDE SERPL-SCNC: 106 MMOL/L (ref 94–109)
CO2 SERPL-SCNC: 32 MMOL/L (ref 20–32)
CREAT SERPL-MCNC: 0.71 MG/DL (ref 0.66–1.25)
ERYTHROCYTE [DISTWIDTH] IN BLOOD BY AUTOMATED COUNT: 13.6 % (ref 10–15)
GFR SERPL CREATININE-BSD FRML MDRD: >90 ML/MIN/1.7M2
GLUCOSE SERPL-MCNC: 86 MG/DL (ref 70–99)
HCT VFR BLD AUTO: 41.6 % (ref 40–53)
HGB BLD-MCNC: 13.6 G/DL (ref 13.3–17.7)
MAGNESIUM SERPL-MCNC: 2 MG/DL (ref 1.6–2.3)
MCH RBC QN AUTO: 31.5 PG (ref 26.5–33)
MCHC RBC AUTO-ENTMCNC: 32.7 G/DL (ref 31.5–36.5)
MCV RBC AUTO: 96 FL (ref 78–100)
PHOSPHATE SERPL-MCNC: 3.9 MG/DL (ref 2.5–4.5)
PLATELET # BLD AUTO: 162 10E9/L (ref 150–450)
POTASSIUM SERPL-SCNC: 3.2 MMOL/L (ref 3.4–5.3)
RBC # BLD AUTO: 4.32 10E12/L (ref 4.4–5.9)
SODIUM SERPL-SCNC: 143 MMOL/L (ref 133–144)
VANCOMYCIN SERPL-MCNC: 14.8 MG/L
WBC # BLD AUTO: 8.3 10E9/L (ref 4–11)

## 2018-01-17 PROCEDURE — 80048 BASIC METABOLIC PNL TOTAL CA: CPT | Performed by: INTERNAL MEDICINE

## 2018-01-17 PROCEDURE — 36415 COLL VENOUS BLD VENIPUNCTURE: CPT | Performed by: INTERNAL MEDICINE

## 2018-01-17 PROCEDURE — C1769 GUIDE WIRE: HCPCS

## 2018-01-17 PROCEDURE — 99207 ZZC APP CREDIT; MD BILLING SHARED VISIT: CPT | Performed by: PHYSICIAN ASSISTANT

## 2018-01-17 PROCEDURE — 27210904 ZZH KIT CR6

## 2018-01-17 PROCEDURE — 0JH63XZ INSERTION OF TUNNELED VASCULAR ACCESS DEVICE INTO CHEST SUBCUTANEOUS TISSUE AND FASCIA, PERCUTANEOUS APPROACH: ICD-10-PCS | Performed by: PHYSICIAN ASSISTANT

## 2018-01-17 PROCEDURE — 83735 ASSAY OF MAGNESIUM: CPT | Performed by: INTERNAL MEDICINE

## 2018-01-17 PROCEDURE — 27210908 ZZH NEEDLE CR4

## 2018-01-17 PROCEDURE — 25000128 H RX IP 250 OP 636: Performed by: INTERNAL MEDICINE

## 2018-01-17 PROCEDURE — 76937 US GUIDE VASCULAR ACCESS: CPT

## 2018-01-17 PROCEDURE — 25000132 ZZH RX MED GY IP 250 OP 250 PS 637: Performed by: INTERNAL MEDICINE

## 2018-01-17 PROCEDURE — 27210738 ZZH ACCESSORY CR2

## 2018-01-17 PROCEDURE — 25000128 H RX IP 250 OP 636: Performed by: PHYSICIAN ASSISTANT

## 2018-01-17 PROCEDURE — 25000132 ZZH RX MED GY IP 250 OP 250 PS 637: Performed by: PHYSICIAN ASSISTANT

## 2018-01-17 PROCEDURE — C1751 CATH, INF, PER/CENT/MIDLINE: HCPCS

## 2018-01-17 PROCEDURE — 25000125 ZZHC RX 250: Performed by: PHYSICIAN ASSISTANT

## 2018-01-17 PROCEDURE — 36558 INSERT TUNNELED CV CATH: CPT

## 2018-01-17 PROCEDURE — 99153 MOD SED SAME PHYS/QHP EA: CPT

## 2018-01-17 PROCEDURE — 85027 COMPLETE CBC AUTOMATED: CPT | Performed by: INTERNAL MEDICINE

## 2018-01-17 PROCEDURE — 80202 ASSAY OF VANCOMYCIN: CPT | Performed by: INTERNAL MEDICINE

## 2018-01-17 PROCEDURE — 02H633Z INSERTION OF INFUSION DEVICE INTO RIGHT ATRIUM, PERCUTANEOUS APPROACH: ICD-10-PCS | Performed by: PHYSICIAN ASSISTANT

## 2018-01-17 PROCEDURE — 99152 MOD SED SAME PHYS/QHP 5/>YRS: CPT

## 2018-01-17 PROCEDURE — 84100 ASSAY OF PHOSPHORUS: CPT | Performed by: INTERNAL MEDICINE

## 2018-01-17 PROCEDURE — 25000125 ZZHC RX 250: Performed by: INTERNAL MEDICINE

## 2018-01-17 PROCEDURE — 27210732 ZZH ACCESSORY CR1

## 2018-01-17 RX ORDER — NALOXONE HYDROCHLORIDE 0.4 MG/ML
.1-.4 INJECTION, SOLUTION INTRAMUSCULAR; INTRAVENOUS; SUBCUTANEOUS
Status: DISCONTINUED | OUTPATIENT
Start: 2018-01-17 | End: 2018-01-17 | Stop reason: HOSPADM

## 2018-01-17 RX ORDER — HEPARIN SODIUM,PORCINE 10 UNIT/ML
5 VIAL (ML) INTRAVENOUS EVERY 24 HOURS
Status: DISCONTINUED | OUTPATIENT
Start: 2018-01-17 | End: 2018-01-17 | Stop reason: HOSPADM

## 2018-01-17 RX ORDER — HEPARIN SODIUM,PORCINE 10 UNIT/ML
5 VIAL (ML) INTRAVENOUS
Status: COMPLETED | OUTPATIENT
Start: 2018-01-17 | End: 2018-01-17

## 2018-01-17 RX ORDER — DIPHENHYDRAMINE HCL 12.5MG/5ML
25 LIQUID (ML) ORAL 2 TIMES DAILY
Qty: 480 ML | Refills: 0 | Status: SHIPPED | OUTPATIENT
Start: 2018-01-17 | End: 2018-06-01

## 2018-01-17 RX ORDER — HEPARIN SODIUM,PORCINE 10 UNIT/ML
5-10 VIAL (ML) INTRAVENOUS
Status: DISCONTINUED | OUTPATIENT
Start: 2018-01-17 | End: 2018-01-17 | Stop reason: HOSPADM

## 2018-01-17 RX ORDER — POTASSIUM CHLORIDE 20MEQ/15ML
40 LIQUID (ML) ORAL ONCE
Status: DISCONTINUED | OUTPATIENT
Start: 2018-01-17 | End: 2018-01-17 | Stop reason: HOSPADM

## 2018-01-17 RX ORDER — CEFTRIAXONE 1 G/1
1000 INJECTION, POWDER, FOR SOLUTION INTRAMUSCULAR; INTRAVENOUS EVERY 24 HOURS
COMMUNITY
Start: 2018-01-17 | End: 2018-01-27

## 2018-01-17 RX ORDER — FENTANYL CITRATE 50 UG/ML
25-50 INJECTION, SOLUTION INTRAMUSCULAR; INTRAVENOUS EVERY 5 MIN PRN
Status: DISCONTINUED | OUTPATIENT
Start: 2018-01-17 | End: 2018-01-17 | Stop reason: HOSPADM

## 2018-01-17 RX ORDER — FLUMAZENIL 0.1 MG/ML
0.2 INJECTION, SOLUTION INTRAVENOUS
Status: DISCONTINUED | OUTPATIENT
Start: 2018-01-17 | End: 2018-01-17 | Stop reason: HOSPADM

## 2018-01-17 RX ORDER — DIPHENHYDRAMINE HYDROCHLORIDE 50 MG/ML
50 INJECTION INTRAMUSCULAR; INTRAVENOUS
Status: DISCONTINUED | OUTPATIENT
Start: 2018-01-17 | End: 2018-01-17 | Stop reason: HOSPADM

## 2018-01-17 RX ADMIN — ONDANSETRON 4 MG: 2 INJECTION INTRAMUSCULAR; INTRAVENOUS at 08:03

## 2018-01-17 RX ADMIN — NYSTATIN: 100000 CREAM TOPICAL at 11:17

## 2018-01-17 RX ADMIN — Medication 5000 UNITS: at 10:30

## 2018-01-17 RX ADMIN — OXYCODONE HYDROCHLORIDE 15 MG: 5 SOLUTION ORAL at 04:19

## 2018-01-17 RX ADMIN — OXYCODONE HYDROCHLORIDE 15 MG: 5 SOLUTION ORAL at 08:02

## 2018-01-17 RX ADMIN — LIDOCAINE HYDROCHLORIDE 10 ML: 10 INJECTION, SOLUTION EPIDURAL; INFILTRATION; INTRACAUDAL; PERINEURAL at 10:56

## 2018-01-17 RX ADMIN — ACETAMINOPHEN 650 MG: 325 SOLUTION ORAL at 04:59

## 2018-01-17 RX ADMIN — DEXTROAMPHETAMINE SACCHARATE, AMPHETAMINE ASPARTATE, DEXTROAMPHETAMINE SULFATE AND AMPHETAMINE SULFATE 20 MG: 2.5; 2.5; 2.5; 2.5 TABLET ORAL at 08:16

## 2018-01-17 RX ADMIN — ONDANSETRON 4 MG: 4 TABLET, ORALLY DISINTEGRATING ORAL at 00:13

## 2018-01-17 RX ADMIN — DIPHENHYDRAMINE HYDROCHLORIDE 25 MG: 25 SOLUTION ORAL at 11:12

## 2018-01-17 RX ADMIN — FENTANYL CITRATE 100 MCG: 50 INJECTION, SOLUTION INTRAMUSCULAR; INTRAVENOUS at 10:52

## 2018-01-17 RX ADMIN — OXYCODONE HYDROCHLORIDE 15 MG: 5 SOLUTION ORAL at 00:13

## 2018-01-17 RX ADMIN — MIDAZOLAM 2 MG: 1 INJECTION INTRAMUSCULAR; INTRAVENOUS at 10:53

## 2018-01-17 RX ADMIN — CARVEDILOL 12.5 MG: 25 TABLET, FILM COATED ORAL at 08:02

## 2018-01-17 RX ADMIN — VANCOMYCIN HYDROCHLORIDE 1750 MG: 10 INJECTION, POWDER, LYOPHILIZED, FOR SOLUTION INTRAVENOUS at 11:12

## 2018-01-17 RX ADMIN — SUCRALFATE 1 G: 1 SUSPENSION ORAL at 08:02

## 2018-01-17 RX ADMIN — SODIUM CHLORIDE, PRESERVATIVE FREE 5 ML: 5 INJECTION INTRAVENOUS at 11:06

## 2018-01-17 RX ADMIN — MULTIVITAMIN 15 ML: LIQUID ORAL at 08:02

## 2018-01-17 RX ADMIN — SUCRALFATE 1 G: 1 SUSPENSION ORAL at 11:06

## 2018-01-17 RX ADMIN — SODIUM CHLORIDE, PRESERVATIVE FREE 5 ML: 5 INJECTION INTRAVENOUS at 10:56

## 2018-01-17 NOTE — PLAN OF CARE
Problem: Patient Care Overview  Goal: Plan of Care/Patient Progress Review  Outcome: No Change  Vitals:    01/16/18 1632 01/16/18 2040 01/16/18 2329 01/17/18 0412   BP: 132/80 135/87 125/77 114/75   BP Location: Right arm Right arm Left arm Left arm   Pulse:       Resp: 18 18 18 18   Temp: 99.3  F (37.4  C) 98.9  F (37.2  C) 98  F (36.7  C) 96.8  F (36  C)   TempSrc: Oral Oral Oral Oral   SpO2: 97% 97% 98% 99%   Weight:       Height:         A&Ox4. Afeb, AVSS. O2 sats 97-99% on RA. Denies SOB. LS clear. Up ad vitor. Ambulating in hallways & going outside. C/o pain in abd, given Oxy q4h & Tylenol x1. Voiding and not saving. No BM reported overnight. NPO w/ meds. G tube clamped. Will go down to get CVC tunneled catheter placed today in IR. Fent patch on R arm. Refused lido patches. Redness in arm decreased,  and swollen. XR of abd done, pt frustrated w/ frequent XRay's he had to do. PIV infiltrated when PPN & Lipids were started, pt agreed for new IV but refused PPN & Lipids to be restarted again. IV ABX given per orders. Benadryl given pre-vanco. Able to make needs known. Continue w/ POC.

## 2018-01-17 NOTE — PROCEDURES
Interventional Radiology Brief Post Procedure Note    Procedure: IR CVC TUNNEL PLACEMENT > 5 YRS OF AGE    Proceduralist: Jhonatan Alejandro PA-C    Assistant: None    Time Out: Prior to the start of the procedure and with procedural staff participation, I verbally confirmed the patient s identity using two indicators, relevant allergies, that the procedure was appropriate and matched the consent or emergent situation, and that the correct equipment/implants were available. Immediately prior to starting the procedure I conducted the Time Out with the procedural staff and re-confirmed the patient s name, procedure, and site/side. (The Joint Commission universal protocol was followed.)  Yes    Sedation: IR Nurse Monitored Care   Post Procedure Summary:  Prior to the start of the procedure and with procedural staff participation, I verbally confirmed the patient s identity using two indicators, relevant allergies, that the procedure was appropriate and matched the consent or emergent situation, and that the correct equipment/implants were available. Immediately prior to starting the procedure I conducted the Time Out with the procedural staff and re-confirmed the patient s name, procedure, and site/side. (The Joint Commission universal protocol was followed.)  Yes       Sedatives: Fentanyl and Midazolam (Versed)    Vital signs, airway and pulse oximetry were monitored and remained stable throughout the procedure and sedation was maintained until the procedure was complete.  The patient was monitored by staff until sedation discharge criteria were met.    Patient tolerance: Patient tolerated the procedure well with no immediate complications.    Time of sedation in minutes: 25 minutes from beginning to end of physician one to one monitoring.    Findings: Completed image-guided placement of 5 Urdu, 27.5 cm single lumen tunneled central venous catheter via right IJ. Aspirates and flushes freely, heparin locked and ready for  immediate use. Tip in high right atrium.    Estimated Blood Loss: Minimal    Fluoroscopy Time: 0.4 minute(s)    SPECIMENS: None    Complications: 1. None     Condition: Stable    Plan: Follow up per primary team. Return for removal as indicated.    Comments: See dictated procedure note for full details.    Jhonatan Alejandro PA-C

## 2018-01-17 NOTE — PROGRESS NOTES
Interventional Radiology Intra-procedural Nursing Note    Patient Name: Parker Acevedo Sr  Medical Record Number: [id number]  Today's Date: January 17, 2018    Start Time: 1020  End of procedure time: 1045  Procedure: Single Lumen Tunneled Central Line Placement for Total Parenteral Nutrition  Report given to: CHRISTY Nash 7B  Time pt departs: 1054  Proceduralist: Jhonatan Alejandro PA-C    Other Notes:      Pt to IR Room #1 from Unit 7B. Consent confirmed with patient; questions addressed. Pt positioned supine and prepped on IR table by technologist. Jhonatan Alejandro PA-C placed 5 fr x 27.5 cm Right IJ Single Lumen Tunneled  CVC, and confirmed placement of catheter tip in RA / SVC junction. Approved for immediate use.  Pt tolerated procedure without apparent incident. Pt denies pain post-procedure.    Sedation Time = 25 minutes  Fentanyl given: 100 mcg  Versed given: 2 mg    Conner Adler RN

## 2018-01-17 NOTE — PROGRESS NOTES
Calorie Counts  Intake recorded for: 1/16  Kcals: 0  Protein: 0g  # Meals Recorded: no meals ordered from kitchen, no intake recorded.   # Supplements Recorded: no intake recorded.

## 2018-01-17 NOTE — PLAN OF CARE
Problem: Patient Care Overview  Goal: Plan of Care/Patient Progress Review  Outcome: Adequate for Discharge Date Met: 01/17/18  Pt discharged from  at 1400.  Discharge instructions, including IV abx plan with [facility] and clinic contact information, and discharge medication discussed.  Pt and spouse had no further questions.  Pt was instructed to  benadryl solution from [place] [facility].  Pt discharged to home with spouse via private car.

## 2018-01-17 NOTE — PROGRESS NOTES
Interventional Radiology Pre-Procedure Sedation Assessment   Time of Assessment: 10:05 AM    Expected Level: Moderate Sedation    Indication: Sedation is required for the following type of Procedure: Venous Access    Sedation and procedural consent: Risks, benefits and alternatives were discussed with Patient    PO Intake: Appropriately NPO for procedure    ASA Class: Class 3 - SEVERE SYSTEMIC DISEASE, DEFINITE FUNCTIONAL LIMITATIONS.    Mallampati: Grade 1:  Soft palate, uvula, tonsillar pillars, and posterior pharyngeal wall visible    Lungs: Lungs Clear with good breath sounds bilaterally    Heart: Normal heart sounds and rate    History and physical reviewed and no updates needed. I have reviewed the lab findings, diagnostic data, medications, and the plan for sedation. I have determined this patient to be an appropriate candidate for the planned sedation and procedure and have reassessed the patient IMMEDIATELY PRIOR to sedation and procedure.    Jhonatan Alejandro PA-C

## 2018-01-17 NOTE — PROGRESS NOTES
Care Coordinator- Discharge Planning     Admission Date/Time:  1/12/2018  Attending MD:  Shola Porter MD     Data  Chart reviewed, discussed with interdisciplinary team.   Patient was admitted for:   1. Gastric bypass status for obesity    2. Infection of venous access port, initial encounter    3. Malnutrition, unspecified type (H)    4. Itching    5. Rash         Assessment  Full assessment completed in previous note    Coordination of Care and Referrals: Provided patient/family with options for Home Infusion.      Plan  Anticipated Discharge Date:  1/17/2018   Anticipated Discharge Plan: dc home with resumption of HI. [facility] June liaison has met with pt and will discuss next abx with pt.  Pt has not received PPN while in pt as his IVs infiltrated and he declined PPN via peripheral IV, therefor [facility] will formulate pts TPN formula for home use.  IV abx orders placed, MD team to see pt then pt will be ready to dc.  Pending final md clearance pt will dc to home today.  [facility] has seen pt and set him up for them to follow out pt.  No other needs known.  NOTE: pt insurance has changed, IV abx are not covered, pt signed self pay quote with [facility] as he did not want to wait to see if [facility] could provide pharmacy only on the medications.       [facility]  Phone  [phone]  Fax  [phone]     CTS Handoff completed:  YES    Shruthi Shelby RNCC, BSN    [facility]    Medicine Group  [address] [place] [address]    [email or web address]    Office: [phone] Pager: [phone]       Shruthi Shelby RN

## 2018-01-17 NOTE — PLAN OF CARE
"Blood pressure 136/84, pulse 67, temperature 95.9  F (35.5  C), temperature source Oral, resp. rate 18, height 1.803 m (5' 11\"), weight 84.4 kg (186 lb), SpO2 98 %.    VSGOPAL MOYER. Pt came back from IR (Cm CVC placed), tolerated procedure and looking forward to dc home. vanco infused through single lumen CVC and heplocked after completed. Pain controlled with current regimen, denies nausea. Continue plan of care for discharge this afternoon.  "

## 2018-01-17 NOTE — PLAN OF CARE
Problem: Patient Care Overview  Goal: Plan of Care/Patient Progress Review  Outcome: No Change  [id number]:Temp 99.3,pt.is alert and oriented ,up independently , c/o  chronic abdominal pain partially controled with oxycodone given by previous nurse. LS diminished ,BS hypoactive, denies nausea.pt had abdominal XR. Result is pending.plant to place CVC tomorrow.

## 2018-01-18 ENCOUNTER — CARE COORDINATION (OUTPATIENT)
Dept: CARE COORDINATION | Facility: CLINIC | Age: 48
End: 2018-01-18

## 2018-01-18 ENCOUNTER — TELEPHONE (OUTPATIENT)
Dept: FAMILY MEDICINE | Facility: CLINIC | Age: 48
End: 2018-01-18

## 2018-01-18 ENCOUNTER — HOME INFUSION (PRE-WILLOW HOME INFUSION) (OUTPATIENT)
Dept: PHARMACY | Facility: CLINIC | Age: 48
End: 2018-01-18

## 2018-01-18 NOTE — TELEPHONE ENCOUNTER
Reason for follow up call: Parker Acevedo Sr appeared on our list for being seen in and recenlty discharge from the Hospital.    Chief Complaint   Patient presents with     Hospital F/U     Infection Of Venous Access Port, Initial Encounter, Gastric Bypass Status For Obesity       Encounter routed for Clinic RN to call for follow up

## 2018-01-18 NOTE — PROGRESS NOTES
Patient was contacted by an RN for post DC follow up so no duplicate post DC follow up call will be made

## 2018-01-18 NOTE — PROGRESS NOTES
Clinic Care Coordination Contact  [facility]/Voicemail    Referral Source: IP/TCU Report  Clinical Data: Care Coordinator Outreach  Outreach attempted x 1.  Left message on voicemail with call back information and requested return call.  Plan: Care Coordinator mailed out care coordination introduction letter on 4/4/16. Care Coordinator will try to reach patient again in 1-2 business days.    Melissa Behl BSN, RN, N  [facility] Care Coordinator  [phone]

## 2018-01-18 NOTE — PROGRESS NOTES
"Home Infusion  Parker is discharging and will be resuming his home TPN and hydration therapies.  He will also be going home on vanco q 12 hrs and daily rocephin. They have administered IV abx via the hp device as well as IVP in the past and are independent with that.  He had a new SL Cm placed today.  Met with Parker and Rose to discuss dc plans and provide education on new IV access.   Due to multiple therapies a \"y\" extension was added to Parker's Cm.  Teaching provided on use of the \"y\" extension for vanco dosing while TPN is running.  We also discussed dosing of the daily rocephin which had been done in the even at the hospital.   Instructed in use of heparin as he has an open ended catheter.   Parker and Rose verbalized understanding of all instructions given.    Just prior to discharge it was discovered that Parker's [employer] policy which has covered his IV abx in the past had termed and his [employer] policy would not cover the abx with [facility].  He is in network with other agencies.  I discussed this with Parker and Rose at length and inquired if they would want to change to a provider who is in network to cover the IV abx (TPN is a covered therapy with [facility]).  Parker feels very comfortable with [facility] and his nurses and does not want to change agencies.  He and Rose are certain they resubmitted their paperwork for his BCBS cobra policy and want to keep services with us.   I informed them we would need a self pay quote signed in case the BCBS policy is not renewed and they were willing to sign that.  TPN, abx and supplies will be sent to Parker's home and nursing services resumed with [facility] on Fri.  They had no further questions for me.  Parker very anxious to leave as soon as his a.m. vanco is done.    Shaylee Haskins [facility] Liaison  [phone] pager  "

## 2018-01-18 NOTE — DISCHARGE SUMMARY
[facility], [place]    Internal Medicine Discharge Summary- Gold Service    Date of Admission:  1/12/2018  Date of Discharge:  1/18/2018  Discharging Provider: Best Dozier PA-C; Dr. Shola Porter MD (pt left before Dr. 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       Hospital Course   Parker Acevedo Sr is a 47 year old male admitted on 1/12/2018. He has a history of multiple abdominal surgeries (RnYGB, lap corey, gastrojejunostomy, gastrectomy, appendectomy) c/b chronic abdominal pain, severe malnutrition, and short gut syndrome now on chronic TPN via port and is admitted for E. Coli Bacteremia.       E. Coli & E. Faecalis Bacteremia likely CLABSI. History of recurrent Port-a-Cath infections most recently exchanged 12/19/17. Previous blood cx positive for strep salivarius and strep mitis (9/21/17), currently peripheral and port blood cx positive 1/12, 1/13 w/ E. Coli (pansensitive) and E. Faecalis (pansensitive). On Ceftriaxone 1/12/18-present and Vancomycin 1/14-present. Remains afebrile with normal white count since 1/13. Most recent BCs x 2 from 1/14 NGTD. CRP continues on downtrend (yesterday am 26; peak of 110 on 1/12). Went to IR 1/15 and had port removed. Port tip sent to lab for culture, which is NGTD. Went to IR again today and had Cm CVC placed to resume IV abx after discharge.   - ID consulted and appreciated recommendations.   - Continue IV Ceftriaxone and IV Vancomycin (needs PO Benadryl prior) for a  "total of 14 days.  - F/u results of BCs from 1/14, as well as port culture, to ensure no growth.       Severe Malnutrition on Chronic TPN. Followed by Dr. Vyas. Due to chronic GI issues (s/p RnYGB, lap corey, gastrojejunostomy, gastrectomy, appendectomy) and short gut syndrome. Has a G tube placed by Dr. Vyas, but only uses to vent. Takes pills PO only if they dissolve or are liquid. On TPN for past 3 years via portacath, which was removed 1/15 given recurrent bacteremia and pt no longer candidate to have port replaced per IR, but is candidate for tunneled CVC and pt agreeable to this as he desires to avoid TFs. Underwent successful insertion of Cm CVC this am without immediate complications. Of note pt also upper GI XR with small bowel follow thru and no strictures found.   - Bariatric surgery consulted and appreciated recommendations (signed off today).  - Resume TPN after discharge via new Cm CVC  - Nutrition also consulted and appreciate following. Continue regular diet for comfort.       Chronic Pain Syndrome. Reports lower back pain, abdominal pain. Follows in pain clinic w/ Dr. Milligan - last seen 1/3/18. Continue PTA fentanyl, oxycodone 10-15mg q4hrs.       Thrombocytopenia, resolved. Previously wnl. Now again as of this am 162 (116). Did not received Heparin this admission. Possibly due to bacteremia. No s/s bleeding.      Hypokalemia: Pt refused PO replacement and IV replacement \"hurts\". Encouraged IV replacement with lidocaine but patient still refused. And K this am 3.2 but pt is refusing both oral and IV replacement due to him adamantly wanting to leave.   - He was instructed to follow up with his family physician in 1-2 weeks for repeat labs including BMP.      Yeast Infection surrounding G tube. Continue BID mycostatin w/ kenalog cream.       JASON, resolved. Baseline Cr 0.7-0.8. Cr on admission 1.05 -->0.84 s/p IVF. Pt is IVF dependent at home, but reports has not used IVF in past week " because he was busy moving. Most recent Cr 0.71.          Chronic, Stable Issues.  History of possible cardiomyopathy w/ recovered EF. Lowest EF ~45%, last 55% (9/2017). No signs of sepsis. Continue carvedilol 12.5mg BID  COPD/Asthma. Continue PTA inhaler  GERD/PUD. Continue PTA sucralfate  ADHD. Continue PTA adderall    Consultations This Hospital Stay     Code Status   Full Code    Time Spent on this Encounter   I, Koko Dozier, personally saw the patient today and spent greater than 30 minutes discharging this patient.     ______________________________________________________________________    Physical Exam   Vital Signs: Temp: 95.9  F (35.5  C) Temp src: Oral BP: 136/84 Pulse: 67 Heart Rate: 55 Resp: 18 SpO2: 98 % O2 Device: None (Room air)    Weight: 186 lbs 0 oz  General Appearance: In NAD  Respiratory: CTAB  Cardiovascular: RRR  GI: soft, non-distended, non-tender  Skin: No acute rashes noted on exposed areas; new Cm site on R upper chest intact without e/o infection  Ext: No BLE edema      Significant Results and Procedures   CMP  Recent Labs  Lab 01/17/18  0830 01/15/18  0801 01/14/18  0643 01/13/18  0923 01/12/18  2323 01/12/18  0005    145* 146* 143 140 141   POTASSIUM 3.2* 3.4 3.5 3.2* 3.0* 2.8*   CHLORIDE 106 112* 112* 110* 108 110*   CO2 32 27 28 25 25 22   ANIONGAP 5 6 6 8 7 9   GLC 86 127* 83 78 88 94   BUN 7 8 11 17 21 11   CR 0.71 0.62* 0.71 0.84 1.05 0.77   GFRESTIMATED >90 >90 >90 >90 76 >90   GFRESTBLACK >90 >90 >90 >90 >90 >90   SILAS 8.6 8.2* 7.8* 8.0* 7.9* 7.5*   MAG 2.0 2.2 2.1 2.0  --   --    PHOS 3.9 3.3 3.7 3.2  --   --    PROTTOTAL  --  6.3* 5.6*  --  6.6* 6.1*   ALBUMIN  --  3.0* 2.7*  --  3.3* 3.1*   BILITOTAL  --  0.4 0.4  --  0.5 0.7   ALKPHOS  --  81 78  --  91 112   AST  --  12 8  --  24 27   ALT  --  36 37  --  55 50     CBC  Recent Labs  Lab 01/17/18  0830 01/16/18  0717 01/15/18  0801 01/14/18  0643   WBC 8.3 9.0 6.9 6.3   RBC 4.32* 4.18* 3.96* 3.71*   HGB  13.6 13.0* 12.2* 11.4*   HCT 41.6 40.2 37.7* 35.4*   MCV 96 96 95 95   MCH 31.5 31.1 30.8 30.7   MCHC 32.7 32.3 32.4 32.2   RDW 13.6 13.9 14.2 14.4    116* 109* 77*     INR  Recent Labs  Lab 01/15/18  0801 01/14/18  0643 01/12/18  2323   INR 1.08 1.12 1.21*        Lab Results   Component Value Date    CRP 26.0 01/16/2018    CRP 45.0 01/15/2018         Pending Results   These results will be followed up by PCP  Unresulted Labs Ordered in the Past 30 Days of this Admission     Date and Time Order Name Status Description    1/14/2018 0809 Blood culture Preliminary     1/14/2018 0809 Blood culture Preliminary     1/13/2018 0843 Blood culture Preliminary     1/13/2018 0843 Blood culture Preliminary     1/12/2018 2329 Blood culture Preliminary     1/12/2018 0012 Blood culture Preliminary              Primary Care Physician   Esteban Daly    Discharge Disposition   Discharged to home  Condition at discharge: Stable    Discharge Orders     Home infusion referral     IV access   **Ordering Provider MUST call/page Care Coordinator/ to discuss arranging this service**    You are going home with the following vascular access device: Cm.  [facility] will follow you for this line.  Please follow their recommendations and use gloves to access this line.     Reason for your hospital stay   Treatment of bacteremia with IV antibiotics; also removed infected portacath and placed new Cm catheter to resume TPN after discharge.     Adult [facility] Follow-up and recommended labs and tests   Follow up with your family physician in 1-2 weeks for hospital follow up.    Appointments on [place] and/or [facility] (with [facility] or [facility] provider or service). Call [phone] if you haven't heard regarding these appointments within 7 days of discharge.     Activity   Your activity upon discharge: activity as tolerated     Full Code     Diet   Follow this diet upon discharge: Regular        Discharge Medications   Discharge Medication List as of 1/17/2018  1:45 PM      START taking these medications    Details   vancomycin 1,750 mg Inject 1,750 mg into the vein every 12 hours, Historical      diphenhydrAMINE (BENADRYL) 12.5 MG/5ML solution Take 10 mLs (25 mg) by mouth 2 times daily, Disp-480 mL, R-0, E-Prescribe         CONTINUE these medications which have NOT CHANGED    Details   cefTRIAXone (CEFTRIAXONE SODIUM) 1 GM vial Inject 1 g (1,000 mg) into the vein every 24 hours, HistoricalLast dose on 1/27      lidocaine (LIDODERM) 5 % Patch Place 1-2 patches onto the skin every 24 hours Wear for 12 hours, remove for 12 hours.  OK to cut to better fit to size.Disp-60 patch, [id number]      nystatin-triamcinolone (MYCOLOG II) cream Apply topically 2 times daily as neededDisp-60 g, [id number]      mupirocin (BACTROBAN) 2 % ointment Apply topically 3 times dailyDisp-30 g, [id number]      ondansetron (ZOFRAN-ODT) 8 MG ODT tab Take 1 tablet (8 mg) by mouth every 8 hours as needed for nausea, Disp-90 tablet, R-3, E-Prescribe      albuterol (PROAIR HFA/PROVENTIL HFA/VENTOLIN HFA) 108 (90 BASE) MCG/ACT Inhaler Inhale 2 puffs into the lungs every 4 hours as needed for shortness of breath / dyspnea or wheezing, Disp-1 Inhaler, R-3, E-Prescribe      Carvedilol (COREG PO) Take 12.5 mg by mouth 2 times daily, Historical      fentaNYL (DURAGESIC) 50 mcg/hr 72 hr patch Place 1 patch onto the skin every 48 hours MYLAN BRAND ONLY. Fill on/after 1/12/18 to start on/after 1/15/18, Disp-15 patch, R-0, Local Print      oxyCODONE (ROXICODONE) 5 MG/5ML solution Take 10-15 mLs (10-15 mg) by mouth every 4 hours as needed for moderate to severe pain Max of 45 mg per day. Fill on/after 1/12/18 not to start untill 1/15/18., Disp-1350 mL, R-0, Local Print      morphine 0.1% in intrasite topical gel Apply as needed prior to accessing the port site., Disp-100 g, R-0, Local Print      Multiple Vitamin (MULTI-VITAMINS)  "TABS Take 1 tablet by mouth, Historical      !! amphetamine-dextroamphetamine (ADDERALL) 20 MG per tablet Take 1 tablet (20 mg) by mouth 2 times daily, Disp-60 tablet, R-0, Local Print      !! amphetamine-dextroamphetamine (ADDERALL) 20 MG per tablet Take 1 tablet (20 mg) by mouth 2 times daily, Disp-60 tablet, R-0, Local Print      !! amphetamine-dextroamphetamine (ADDERALL) 20 MG per tablet Take 1 tablet (20 mg) by mouth 2 times daily, Disp-60 tablet, R-0, Local Print      Needle, Disp, (BD DISP NEEDLES) 27G X 1/2\" MISC 1 Device every 30 days Use for cyanocobalamin injection once q 30 days., Disp-3 each, R-4, E-Prescribe      sucralfate (CARAFATE) 1 GM/10ML suspension Take 10 mLs (1 g) by mouth 4 times daily, Disp-1200 mL, R-11, E-Prescribe      !! cyanocobalamin (VITAMIN B12) 1000 MCG/ML injection Inject 1 mL (1,000 mcg) into the muscle every 30 days, Disp-1 mL, R-11, E-Prescribe      lidocaine-prilocaine (EMLA) cream Apply topically as needed for moderate painDisp-30 g, [id number]      vitamin D (ERGOCALCIFEROL) 62438 UNIT capsule Take 1 capsule (50,000 Units) by mouth every 7 days, Disp-12 capsule, R-3, E-Prescribe      !! cyanocobalamin (VITAMIN B12) 1000 MCG/ML injection Inject 1 mL into the muscle every 30 days, Historical      naloxone (NARCAN) nasal spray Spray 1 spray (4 mg) into one nostril alternating nostrils as needed for opioid reversal (every 2-3 minutes until assistance arrives.), Disp-0.2 mL, R-0, E-Prescribe      !! [place] [facility] MANAGED PATIENT Contact [facility] for patient specific medication information at [phone] on admission and discharge from the hospital.  Phones are answered 24 hours a day 7 days a week 365 days a year.    Providers - Choose \"CONTINUE HOME MED (no scr ipt)\" at discharge if patient treatment with home infusion will continue., No Print OutContinue home infusion      !! order for DME Equipment being ordered: Bilateral knee high chronic " "venous insufficiency stockings--  mild-moderate pressures.Disp-4 each, R-5, Local Print      !! [place] [facility] INFUSION MANAGED PATIENT Contact [facility] for patient specific medication information at [phone] on admission and discharge from the hospital.  Phones are answered 24 hours a day 7 days a week 365 days a year.    Providers - Choose \"CONTINUE HOME MED (no scr ipt)\" at discharge if patient treatment with home infusion will continue., No Print OutResume prior to admission TPN/Lipids/saline      !! order for DME Injection Supplies for Vitamin B12: 3cc syringes w/ 27 gauge needles, 1/2 inch lengthDisp-12 each, R-0, Local Print       !! - Potential duplicate medications found. Please discuss with provider.      STOP taking these medications       potassium bicarbonate (K-LYTE) 25 MEQ solu-tab Comments:   Reason for Stopping:             Allergies   Allergies   Allergen Reactions     Bactrim [Sulfamethoxazole W/Trimethoprim] Rash     Penicillins Anaphylaxis     Doxycycline Rash     Vancomycin Rash     Rash after receiving vancomycin 3/28/16 (red man's?). Tolerated with slower infusion and diphenhydramine premed.     "

## 2018-01-18 NOTE — PROGRESS NOTES
This is a recent snapshot of the patient's [facility] medical record.  For current drug dose and complete information and questions, call [phone] or In Basket pool, [facility] home infusion ([id number])  CSN Number:  [id number]

## 2018-01-19 LAB
BACTERIA SPEC CULT: NO GROWTH
SPECIMEN SOURCE: NORMAL

## 2018-01-19 NOTE — PROGRESS NOTES
Clinic Care Coordination Contact  [facility]/Voicemail    Referral Source: IP/TCU Report  Clinical Data: Care Coordinator Outreach  Outreach attempted x 2.  Left message on voicemail with call back information and requested return call.  Plan: Care Coordinator mailed out care coordination introduction letter on 4/4/16. Care Coordinator will try to reach patient again in 3-5 business days.    Melissa Behl BSN, RN, N  [facility] Care Coordinator  [phone]

## 2018-01-19 NOTE — TELEPHONE ENCOUNTER
"Hospital/TCU/ED for chronic condition Discharge Protocol    \"Hi, my name is Nafisa BANEGASDaryl Fay, a registered nurse, and I am calling from [facility].  I am calling to follow up and see how things are going for you after your recent emergency visit/hospital/TCU stay.\"    Tell me how you are doing now that you are home?\" \"doing okay\" Wife states she just got home and pt said he vomited x2. He has now taken Zofran and is feeling better      Discharge Instructions    \"Let's review your discharge instructions.  What is/are the follow-up recommendations?  Pt./wife Response: Follow-up with Dr Daly in 1-2 weeks    \"Has an appointment with your primary care provider been scheduled?\"   No (schedule appointment)   Next 5 appointments (look out 90 days)     Jan 23, 2018 11:40 AM CST   Office Visit with Esteban Daly MD   [facility] ([facility])    [address], [address]  [facility] [id number] [phone]            Apr 04, 2018  1:30 PM CDT   Return Visit with Patti Milligan MD   [facility] ([facility])    [address] [id number] [phone]                  \"When you see the provider, I would recommend that you bring your medications with you.\"    Medications    \"Tell me what changed about your medicines when you discharged?\"    Changes to chronic meds?    0-1    \"What questions do you have about your medications?\"    None     New diagnoses of heart failure, COPD, diabetes, or MI?    No       Was MTM referral placed (*Make sure to put transitions as reason for referral)?   No    Call Summary    \"What questions or concerns do you have about your recent visit and your follow-up care?\"     none    \"If you have questions or things don't continue to improve, we encourage you contact us through the main clinic number (give number).  Even if the clinic is not open, triage nurses are available 24/7 to help you. " "    We would like you to know that our clinic has extended hours (provide information).  We also have urgent care (provide details on closest location and hours/contact info)\"      \"Thank you for your time and take care!\"     Nafisa Aponte RN, BSN      "

## 2018-01-20 ENCOUNTER — HOME INFUSION (PRE-WILLOW HOME INFUSION) (OUTPATIENT)
Dept: PHARMACY | Facility: CLINIC | Age: 48
End: 2018-01-20

## 2018-01-20 LAB
BACTERIA SPEC CULT: ABNORMAL
BACTERIA SPEC CULT: NO GROWTH
BACTERIA SPEC CULT: NO GROWTH
CREAT SERPL-MCNC: 0.67 MG/DL (ref 0.66–1.25)
CRP SERPL-MCNC: 5.4 MG/L (ref 0–8)
ERYTHROCYTE [SEDIMENTATION RATE] IN BLOOD BY WESTERGREN METHOD: 11 MM/H (ref 0–15)
GFR SERPL CREATININE-BSD FRML MDRD: >90 ML/MIN/1.7M2
SPECIMEN SOURCE: ABNORMAL
SPECIMEN SOURCE: ABNORMAL
SPECIMEN SOURCE: NORMAL
SPECIMEN SOURCE: NORMAL
VANCOMYCIN SERPL-MCNC: 11.2 MG/L

## 2018-01-20 PROCEDURE — 82565 ASSAY OF CREATININE: CPT | Performed by: SURGERY

## 2018-01-20 PROCEDURE — 86140 C-REACTIVE PROTEIN: CPT | Performed by: SURGERY

## 2018-01-20 PROCEDURE — 85652 RBC SED RATE AUTOMATED: CPT | Performed by: SURGERY

## 2018-01-20 PROCEDURE — 80202 ASSAY OF VANCOMYCIN: CPT | Performed by: SURGERY

## 2018-01-21 ENCOUNTER — HOME INFUSION (PRE-WILLOW HOME INFUSION) (OUTPATIENT)
Dept: PHARMACY | Facility: CLINIC | Age: 48
End: 2018-01-21

## 2018-01-22 NOTE — PROGRESS NOTES
This is a recent snapshot of the patient's [facility] medical record.  For current drug dose and complete information and questions, call [phone] or In Basket pool, [facility] home infusion ([id number])  CSN Number:  [id number]

## 2018-01-23 ENCOUNTER — HOSPITAL ENCOUNTER (OUTPATIENT)
Dept: LAB | Facility: CLINIC | Age: 48
Discharge: HOME | End: 2018-01-23
Attending: SURGERY | Admitting: SURGERY
Payer: COMMERCIAL

## 2018-01-23 ENCOUNTER — HOME INFUSION (PRE-WILLOW HOME INFUSION) (OUTPATIENT)
Dept: PHARMACY | Facility: CLINIC | Age: 48
End: 2018-01-23

## 2018-01-23 LAB
ALBUMIN SERPL-MCNC: 3.4 G/DL (ref 3.4–5)
ALP SERPL-CCNC: 69 U/L (ref 40–150)
ALT SERPL W P-5'-P-CCNC: 21 U/L (ref 0–70)
ANION GAP SERPL CALCULATED.3IONS-SCNC: 5 MMOL/L (ref 3–14)
AST SERPL W P-5'-P-CCNC: 8 U/L (ref 0–45)
BASOPHILS # BLD AUTO: 0 10E9/L (ref 0–0.2)
BASOPHILS NFR BLD AUTO: 0.7 %
BILIRUB DIRECT SERPL-MCNC: <0.1 MG/DL (ref 0–0.2)
BILIRUB SERPL-MCNC: 0.3 MG/DL (ref 0.2–1.3)
BUN SERPL-MCNC: 16 MG/DL (ref 7–30)
CALCIUM SERPL-MCNC: 8.3 MG/DL (ref 8.5–10.1)
CHLORIDE SERPL-SCNC: 108 MMOL/L (ref 94–109)
CO2 SERPL-SCNC: 31 MMOL/L (ref 20–32)
CREAT SERPL-MCNC: 0.61 MG/DL (ref 0.66–1.25)
CRP SERPL-MCNC: <2.9 MG/L (ref 0–8)
DIFFERENTIAL METHOD BLD: ABNORMAL
EOSINOPHIL # BLD AUTO: 0.3 10E9/L (ref 0–0.7)
EOSINOPHIL NFR BLD AUTO: 4.3 %
ERYTHROCYTE [DISTWIDTH] IN BLOOD BY AUTOMATED COUNT: 14 % (ref 10–15)
ERYTHROCYTE [SEDIMENTATION RATE] IN BLOOD BY WESTERGREN METHOD: 10 MM/H (ref 0–15)
GFR SERPL CREATININE-BSD FRML MDRD: >90 ML/MIN/1.7M2
GLUCOSE SERPL-MCNC: 91 MG/DL (ref 70–99)
HCT VFR BLD AUTO: 39.6 % (ref 40–53)
HGB BLD-MCNC: 12.5 G/DL (ref 13.3–17.7)
IMM GRANULOCYTES # BLD: 0 10E9/L (ref 0–0.4)
IMM GRANULOCYTES NFR BLD: 0.2 %
LYMPHOCYTES # BLD AUTO: 2.3 10E9/L (ref 0.8–5.3)
LYMPHOCYTES NFR BLD AUTO: 39.3 %
MAGNESIUM SERPL-MCNC: 2.1 MG/DL (ref 1.6–2.3)
MCH RBC QN AUTO: 31.1 PG (ref 26.5–33)
MCHC RBC AUTO-ENTMCNC: 31.6 G/DL (ref 31.5–36.5)
MCV RBC AUTO: 99 FL (ref 78–100)
MONOCYTES # BLD AUTO: 0.7 10E9/L (ref 0–1.3)
MONOCYTES NFR BLD AUTO: 11.1 %
NEUTROPHILS # BLD AUTO: 2.6 10E9/L (ref 1.6–8.3)
NEUTROPHILS NFR BLD AUTO: 44.4 %
PHOSPHATE SERPL-MCNC: 3.8 MG/DL (ref 2.5–4.5)
PLATELET # BLD AUTO: 208 10E9/L (ref 150–450)
PLATELET # BLD EST: ABNORMAL 10*3/UL
POTASSIUM SERPL-SCNC: 3.9 MMOL/L (ref 3.4–5.3)
PROT SERPL-MCNC: 6.9 G/DL (ref 6.8–8.8)
RBC # BLD AUTO: 4.02 10E12/L (ref 4.4–5.9)
RBC MORPH BLD: NORMAL
SODIUM SERPL-SCNC: 144 MMOL/L (ref 133–144)
TRIGL SERPL-MCNC: 60 MG/DL
VANCOMYCIN SERPL-MCNC: 10.9 MG/L
WBC # BLD AUTO: 5.8 10E9/L (ref 4–11)

## 2018-01-23 PROCEDURE — 83735 ASSAY OF MAGNESIUM: CPT | Performed by: SURGERY

## 2018-01-23 PROCEDURE — 85652 RBC SED RATE AUTOMATED: CPT | Performed by: SURGERY

## 2018-01-23 PROCEDURE — 80202 ASSAY OF VANCOMYCIN: CPT | Performed by: SURGERY

## 2018-01-23 PROCEDURE — 82248 BILIRUBIN DIRECT: CPT | Performed by: SURGERY

## 2018-01-23 PROCEDURE — 80053 COMPREHEN METABOLIC PANEL: CPT | Performed by: SURGERY

## 2018-01-23 PROCEDURE — 84478 ASSAY OF TRIGLYCERIDES: CPT | Performed by: SURGERY

## 2018-01-23 PROCEDURE — 84100 ASSAY OF PHOSPHORUS: CPT | Performed by: SURGERY

## 2018-01-23 PROCEDURE — 86140 C-REACTIVE PROTEIN: CPT | Performed by: SURGERY

## 2018-01-23 PROCEDURE — 85025 COMPLETE CBC W/AUTO DIFF WBC: CPT | Performed by: SURGERY

## 2018-01-23 PROCEDURE — 84134 ASSAY OF PREALBUMIN: CPT | Performed by: SURGERY

## 2018-01-23 NOTE — PROGRESS NOTES
This is a recent snapshot of the patient's [facility] medical record.  For current drug dose and complete information and questions, call [phone] or In Basket pool, [facility] home infusion ([id number])  CSN Number:  [id number]

## 2018-01-24 ENCOUNTER — HOME INFUSION (PRE-WILLOW HOME INFUSION) (OUTPATIENT)
Dept: PHARMACY | Facility: CLINIC | Age: 48
End: 2018-01-24

## 2018-01-24 LAB — PREALB SERPL IA-MCNC: 25 MG/DL (ref 15–45)

## 2018-01-24 ASSESSMENT — ANXIETY QUESTIONNAIRES
6. BECOMING EASILY ANNOYED OR IRRITABLE: NOT AT ALL
3. WORRYING TOO MUCH ABOUT DIFFERENT THINGS: NOT AT ALL
7. FEELING AFRAID AS IF SOMETHING AWFUL MIGHT HAPPEN: NOT AT ALL
4. TROUBLE RELAXING: SEVERAL DAYS
5. BEING SO RESTLESS THAT IT IS HARD TO SIT STILL: NOT AT ALL
GAD7 TOTAL SCORE: 1
1. FEELING NERVOUS, ANXIOUS, OR ON EDGE: NOT AT ALL
2. NOT BEING ABLE TO STOP OR CONTROL WORRYING: NOT AT ALL

## 2018-01-24 ASSESSMENT — PATIENT HEALTH QUESTIONNAIRE - PHQ9: SUM OF ALL RESPONSES TO PHQ QUESTIONS 1-9: 6

## 2018-01-24 NOTE — PROGRESS NOTES
This is a recent snapshot of the patient's [facility] medical record.  For current drug dose and complete information and questions, call [phone] or In [facility] pool, fv home infusion ([id number])  CSN Number:  [id number]

## 2018-01-24 NOTE — PROGRESS NOTES
SUBJECTIVE:   Parker Acevedo Sr is a 47 year old male who presents to clinic today for the following health issues:      Physical   Annual:     Getting at least 3 servings of Calcium per day::  NO    Bi-annual eye exam::  Yes    Dental care twice a year::  NO    Sleep apnea or symptoms of sleep apnea::  Daytime drowsiness    Diet::  Other    Frequency of exercise::  2-3 days/week    Duration of exercise::  Other    Taking medications regularly::  Yes    Medication side effects::  Not applicable    Additional concerns today::  No            ED/UC Follow up:    This patient was recently hospitalized for gram-negative sepsis related to port infection.  He has had the port removed multiple times including his current admission.  He is just completed vancomycin and ceftriaxone and will be followed through the infectious disease specialty staff.  Currently states he has low-grade fevers around  .  Continues to be generally weak but improving.  Currently he has some minor coughing but no major dyspnea.     His current nutritional needs are addressed with TPN through a Cm catheter in his right upper chest.  He likely will not have future port placements due to the frequent infections.  He is not able to take p.o.    Facility:  [facility]  Date of visit: 01/12/18  Reason for visit: Blood infection  Current Status: wheezing in chest at night       Problem list and histories reviewed & adjusted, as indicated.  Additional history: as documented            ROS:  CONSTITUTIONAL: No apparent temperature rises over 100 .  Generally weak from his nutritional status association with his infection.  Continues to be totally disabled from his work.  His generalized weakness leads to limited amount of sitting, standing and walking to less than one third of the day.  He has some difficulty with his hands in terms of fine manipulation and grasping objects.  He frequently has lost control of objects.  I: NEGATIVE for  worrisome rashes, moles or lesions  E: NEGATIVE for vision changes or irritation  EYES: Reports some light sensitivity for which he needs sunglasses.  E/M: NEGATIVE for ear, mouth and throat problems  RESP:NEGATIVE for significant cough or SOB and ongoing minor coughing but without major respiratory distress.  B: NEGATIVE for masses, tenderness or discharge  CV: NEGATIVE for chest pain, palpitations or peripheral edema.  He currently is on Coreg and followed by cardiology.  He apparently had palpitations in the past.  Earlier it had an echocardiogram showing some mild cardiomegaly.  This apparently has resolved and was more related to nutritional status.  No major symptoms of heart failure.  GI: Has chronic abdominal pain for which he is followed by his chronic pain physician.  He is on fentanyl patches and liquid oxycodone.  He has had gastric bypass surgery for obesity and subsequently has had a total gastrectomy.  Has a gastric vent tube in his abdomen.  If due to multiple complications he has chronic abdominal pain.  : NEGATIVE for frequency, dysuria, or hematuria  MUSCULOSKELETAL: He has ongoing difficulty with his hands and difficulty at times grasping objects and manipulating them.  He is limited in the amount of sitting, standing and walking that he can do.  He has generalized weakness and is not able to lift or carry objects.  Is not able to push or pull any amount of weight.  His balance at times is unsteady and should not be climbing ladders or stairs.  He has some difficulty with balancing but no major frequent falls.  Overall his activities are quite limited due to his physical status.  N: NEGATIVE for weakness, dizziness or paresthesias  E: NEGATIVE for temperature intolerance, skin/hair changes  H: NEGATIVE for bleeding problems  P: NEGATIVE for changes in mood or affect  PSYCHIATRIC: Relatively stable ADHD with Adderall.  Renewing medications for 3 months.    OBJECTIVE:                               "                      /76  Pulse 68  Temp 99.3  F (37.4  C) (Temporal)  Resp 18  Ht 5' 11\" (1.803 m)  Wt 203 lb 14.4 oz (92.5 kg)  SpO2 100%  BMI 28.44 kg/m2  Body mass index is 28.44 kg/(m^2).  GENERAL: alert and cooperative  EYES: Eyes grossly normal to inspection, extraocular movements - intact, and PERRL  HENT: ear canals- normal; TMs- normal; Nose- normal; Mouth- no ulcers, no lesions  NECK: no tenderness, no adenopathy, no asymmetry, no masses, no stiffness; thyroid- normal to palpation  RESP: lungs clear to auscultation - no rales, no rhonchi, no wheezes  RESP: A Cm catheter noted in his right upper chest.  CV: regular rates and rhythm, normal S1 S2, no S3 or S4 and no murmur, no click or rub -  ABDOMEN: Small gastric tube in the left upper quadrant.  No major distention.  Mild tenderness throughout the abdomen but no guarding.  Multiple surgical scars.  MS: No major edema.  Some mild arthritic changes in his hands  SKIN: no suspicious lesions, no rashes  NEURO: Higher cortical functioning appears intact.  No definite focal deficit noted.  Generally weak throughout.  Balance is fair.  BACK: no CVA tenderness, no paralumbar tenderness  PSYCH: Alert and oriented times 3; speech- coherent , normal rate and volume; able to articulate logical thoughts, able to abstract reason, no tangential thoughts, no hallucinations or delusions, affect- normal  LYMPHATICS: ant. cervical- normal, post. cervical- normal, axillary- normal, supraclavicular- normal, inguinal- normal    Diagnostic test results:  Diagnostic Test Results:  none      ASSESSMENT/PLAN:                                                     1. Status post gastrectomy  On TPN, not able to tolerate p.o.    2. On total parenteral nutrition  On TPN through the Cm catheter    3. ADHD (attention deficit hyperactivity disorder), inattentive type  Renew medications for 3 months.  No change in current doses  - amphetamine-dextroamphetamine " (ADDERALL) 20 MG per tablet; Take 1 tablet (20 mg) by mouth 2 times daily  Dispense: 60 tablet; Refill: 0  - amphetamine-dextroamphetamine (ADDERALL) 20 MG per tablet; Take 1 tablet (20 mg) by mouth 2 times daily  Dispense: 60 tablet; Refill: 0  - amphetamine-dextroamphetamine (ADDERALL) 20 MG per tablet; Take 1 tablet (20 mg) by mouth 2 times daily  Dispense: 60 tablet; Refill: 0    4. CARDIOVASCULAR SCREENING; LDL GOAL LESS THAN 160  Check lipids with his next blood draw.  - Lipid panel reflex to direct LDL Fasting; Future    5. Chronic abdominal pain  Follow through pain management and is on fentanyl and oxycodone    6. Generalized weakness  Secondary to current overall physical status generalized weakness from overall physical status and nutritional needs.  Not able to be gainfully employed.    7. Cardiomyopathy secondary to nutritional deficiency  Appears to have improved with TPN.  Most recent echocardiogram appears to show normal left ventricular size and function.  Currently on Coreg for palpitations.      Follow up with Provider -Follow-up in 3 months for ongoing care.  See Patient Instructions    The patient understood the rational for the diagnosis and treatment plan. All questions were answered to best of my ability and the patient's satisfaction.    Note: Chart documentation done in part with Dragon Voice Recognition software. Although reviewed after completion, some word and grammatical errors may remain.  Please consider this when interpreting information in this chart.      Esteban Daly MD  [facility]

## 2018-01-24 NOTE — PROGRESS NOTES
This is a recent snapshot of the patient's [facility] medical record.  For current drug dose and complete information and questions, call [phone] or In Basket pool, fv home infusion ([id number])  CSN Number:  [id number]

## 2018-01-25 ENCOUNTER — HOME INFUSION (PRE-WILLOW HOME INFUSION) (OUTPATIENT)
Dept: PHARMACY | Facility: CLINIC | Age: 48
End: 2018-01-25

## 2018-01-25 LAB
ALBUMIN SERPL-MCNC: 3.2 G/DL (ref 3.4–5)
ALP SERPL-CCNC: 72 U/L (ref 40–150)
ALT SERPL W P-5'-P-CCNC: 18 U/L (ref 0–70)
ANION GAP SERPL CALCULATED.3IONS-SCNC: 3 MMOL/L (ref 3–14)
AST SERPL W P-5'-P-CCNC: 13 U/L (ref 0–45)
BASOPHILS # BLD AUTO: 0 10E9/L (ref 0–0.2)
BASOPHILS NFR BLD AUTO: 0.7 %
BILIRUB DIRECT SERPL-MCNC: <0.1 MG/DL (ref 0–0.2)
BILIRUB SERPL-MCNC: 0.3 MG/DL (ref 0.2–1.3)
BUN SERPL-MCNC: 16 MG/DL (ref 7–30)
CALCIUM SERPL-MCNC: 8.1 MG/DL (ref 8.5–10.1)
CHLORIDE SERPL-SCNC: 111 MMOL/L (ref 94–109)
CO2 SERPL-SCNC: 31 MMOL/L (ref 20–32)
CREAT SERPL-MCNC: 0.58 MG/DL (ref 0.66–1.25)
DIFFERENTIAL METHOD BLD: ABNORMAL
EOSINOPHIL # BLD AUTO: 0.2 10E9/L (ref 0–0.7)
EOSINOPHIL NFR BLD AUTO: 3.9 %
ERYTHROCYTE [DISTWIDTH] IN BLOOD BY AUTOMATED COUNT: 13.6 % (ref 10–15)
GFR SERPL CREATININE-BSD FRML MDRD: >90 ML/MIN/1.7M2
GLUCOSE SERPL-MCNC: 94 MG/DL (ref 70–99)
HCT VFR BLD AUTO: 39.8 % (ref 40–53)
HGB BLD-MCNC: 12.6 G/DL (ref 13.3–17.7)
IMM GRANULOCYTES # BLD: 0 10E9/L (ref 0–0.4)
IMM GRANULOCYTES NFR BLD: 0.2 %
LYMPHOCYTES # BLD AUTO: 2.2 10E9/L (ref 0.8–5.3)
LYMPHOCYTES NFR BLD AUTO: 38.2 %
MAGNESIUM SERPL-MCNC: 2.1 MG/DL (ref 1.6–2.3)
MCH RBC QN AUTO: 31 PG (ref 26.5–33)
MCHC RBC AUTO-ENTMCNC: 31.7 G/DL (ref 31.5–36.5)
MCV RBC AUTO: 98 FL (ref 78–100)
MONOCYTES # BLD AUTO: 0.8 10E9/L (ref 0–1.3)
MONOCYTES NFR BLD AUTO: 13.1 %
NEUTROPHILS # BLD AUTO: 2.6 10E9/L (ref 1.6–8.3)
NEUTROPHILS NFR BLD AUTO: 43.9 %
NRBC # BLD AUTO: 0 10*3/UL
NRBC BLD AUTO-RTO: 0 /100
PHOSPHATE SERPL-MCNC: 4.4 MG/DL (ref 2.5–4.5)
PLATELET # BLD AUTO: 204 10E9/L (ref 150–450)
POTASSIUM SERPL-SCNC: 4 MMOL/L (ref 3.4–5.3)
PREALB SERPL IA-MCNC: 27 MG/DL (ref 15–45)
PROT SERPL-MCNC: 6.7 G/DL (ref 6.8–8.8)
RBC # BLD AUTO: 4.07 10E12/L (ref 4.4–5.9)
SODIUM SERPL-SCNC: 145 MMOL/L (ref 133–144)
TRIGL SERPL-MCNC: 52 MG/DL
VANCOMYCIN SERPL-MCNC: 9.7 MG/L
WBC # BLD AUTO: 5.9 10E9/L (ref 4–11)

## 2018-01-25 PROCEDURE — 85025 COMPLETE CBC W/AUTO DIFF WBC: CPT | Performed by: SURGERY

## 2018-01-25 PROCEDURE — 80202 ASSAY OF VANCOMYCIN: CPT | Performed by: SURGERY

## 2018-01-25 PROCEDURE — 80053 COMPREHEN METABOLIC PANEL: CPT | Performed by: SURGERY

## 2018-01-25 PROCEDURE — 84478 ASSAY OF TRIGLYCERIDES: CPT | Performed by: SURGERY

## 2018-01-25 PROCEDURE — 84100 ASSAY OF PHOSPHORUS: CPT | Performed by: SURGERY

## 2018-01-25 PROCEDURE — 84134 ASSAY OF PREALBUMIN: CPT | Performed by: SURGERY

## 2018-01-25 PROCEDURE — 82248 BILIRUBIN DIRECT: CPT | Performed by: SURGERY

## 2018-01-25 PROCEDURE — 83735 ASSAY OF MAGNESIUM: CPT | Performed by: SURGERY

## 2018-01-25 ASSESSMENT — PATIENT HEALTH QUESTIONNAIRE - PHQ9: SUM OF ALL RESPONSES TO PHQ QUESTIONS 1-9: 6

## 2018-01-25 ASSESSMENT — ANXIETY QUESTIONNAIRES: GAD7 TOTAL SCORE: 1

## 2018-01-25 NOTE — PROGRESS NOTES
This is a recent snapshot of the patient's [facility] medical record.  For current drug dose and complete information and questions, call [phone] or In Basket pool, fv home infusion ([id number])  CSN Number:  [id number]

## 2018-01-26 ENCOUNTER — HOME INFUSION (PRE-WILLOW HOME INFUSION) (OUTPATIENT)
Dept: PHARMACY | Facility: CLINIC | Age: 48
End: 2018-01-26

## 2018-01-26 ENCOUNTER — HOSPITAL ENCOUNTER (OUTPATIENT)
Dept: LAB | Facility: CLINIC | Age: 48
Discharge: HOME | End: 2018-01-26
Attending: SURGERY | Admitting: SURGERY
Payer: COMMERCIAL

## 2018-01-26 LAB
CREAT SERPL-MCNC: 0.64 MG/DL (ref 0.66–1.25)
GFR SERPL CREATININE-BSD FRML MDRD: >90 ML/MIN/1.7M2
VANCOMYCIN SERPL-MCNC: 9.1 MG/L

## 2018-01-26 PROCEDURE — 80202 ASSAY OF VANCOMYCIN: CPT | Performed by: SURGERY

## 2018-01-26 PROCEDURE — 82565 ASSAY OF CREATININE: CPT | Performed by: SURGERY

## 2018-01-26 ASSESSMENT — PATIENT HEALTH QUESTIONNAIRE - PHQ9
SUM OF ALL RESPONSES TO PHQ QUESTIONS 1-9: 6
10. IF YOU CHECKED OFF ANY PROBLEMS, HOW DIFFICULT HAVE THESE PROBLEMS MADE IT FOR YOU TO DO YOUR WORK, TAKE CARE OF THINGS AT HOME, OR GET ALONG WITH OTHER PEOPLE: SOMEWHAT DIFFICULT
SUM OF ALL RESPONSES TO PHQ QUESTIONS 1-9: 6

## 2018-01-26 ASSESSMENT — ANXIETY QUESTIONNAIRES
GAD7 TOTAL SCORE: 1
GAD7 TOTAL SCORE: 1
7. FEELING AFRAID AS IF SOMETHING AWFUL MIGHT HAPPEN: NOT AT ALL
3. WORRYING TOO MUCH ABOUT DIFFERENT THINGS: NOT AT ALL
2. NOT BEING ABLE TO STOP OR CONTROL WORRYING: NOT AT ALL
1. FEELING NERVOUS, ANXIOUS, OR ON EDGE: NOT AT ALL
7. FEELING AFRAID AS IF SOMETHING AWFUL MIGHT HAPPEN: NOT AT ALL
6. BECOMING EASILY ANNOYED OR IRRITABLE: NOT AT ALL
4. TROUBLE RELAXING: SEVERAL DAYS
GAD7 TOTAL SCORE: 1
5. BEING SO RESTLESS THAT IT IS HARD TO SIT STILL: NOT AT ALL

## 2018-01-26 NOTE — PROGRESS NOTES
Clinic Care Coordination Contact  OUTREACH    Referral Information:  Referral Source: IP/TCU Report  Reason for Contact: RN CC call to patient's spouse for hospital follow up  Care Conference: No     Universal Utilization:   ED Visits in last year: 7  Hospital visits in last year: 4  Last PCP appointment: 11/06/17  Missed Appointments: 1  Concerns: yes, high utilization  Multiple Providers or Specialists: yes    Clinical Concerns:  Current Medical Concerns: Patient was inpatient at [facility] 1/12/18-1/18/18 for e.coli and e.faecalis bacteremia, severe malnutrition on chronic TPN, chronic pain syndrome, thrombocytopenia (resolved), hypokalemia, yeast infection surround G-tube, JASON (resolved), COPD, asthma, GERD, PVD, ADHD.  Patient was instructed to follow up with PCP in 1-2 weeks and has a future appointment scheduled 1/30/18.  Patient's spouse Rose states patient has improved since he has been home.  He has a Cm line in place and Rose states they will no longer place ports in patient due to his frequency of infections.  [facility] continues to manage patient's TPN/central line cares.  No other concerns at this time.   Current Behavioral Concerns: Patient has diagnosis of ADHD.    Education Provided to patient: RN CC reviewed hospital discharge instructions, patient has future appointment with PCP 1/30/18.   Clinical Pathway Name: None    Medication Management:  Patient's spouse manages patient's medications.     Functional Status:  Mobility Status: Independent  Equipment Currently Used at Home: cane, straight  Transportation: No concerns at this time.           Psychosocial:  Current living arrangement:: I live in a private home with spouse  Financial/Insurance: No concerns at this time.       Resources and Interventions:  Current Resources: Home Care, Home Infusion; Other (see comment) ([employer] and [employer] resolving rental)        Advanced Care Plans/Directives on file::  Yes     Patient/Caregiver understanding: Patient's spouse verbalizes understanding of hospital discharge instructions, has been working with [facility] and has a future appointment scheduled for patient 1/30/18.  Frequency of Care Coordination: monthly  Upcoming appointment: 04/04/18     Plan:   Patient will continue to follow treatment plan as directed and follow up with PCP with concerns ongoing.   RN CC will continue to follow patient on a monthly and as needed basis.    Melissa Behl BSN, RN, N  [facility] Care Coordinator  [phone]

## 2018-01-26 NOTE — PROGRESS NOTES
This is a recent snapshot of the patient's [facility] medical record.  For current drug dose and complete information and questions, call [phone] or In Basket pool, fv home infusion ([id number])  CSN Number:  [id number]

## 2018-01-29 NOTE — PROGRESS NOTES
This is a recent snapshot of the patient's [facility] medical record.  For current drug dose and complete information and questions, call [phone] or In Basket pool, fv home infusion ([id number])  CSN Number:  [id number]

## 2018-01-30 ENCOUNTER — OFFICE VISIT (OUTPATIENT)
Dept: FAMILY MEDICINE | Facility: CLINIC | Age: 48
End: 2018-01-30
Payer: COMMERCIAL

## 2018-01-30 VITALS
WEIGHT: 203.9 LBS | DIASTOLIC BLOOD PRESSURE: 76 MMHG | SYSTOLIC BLOOD PRESSURE: 122 MMHG | BODY MASS INDEX: 28.55 KG/M2 | OXYGEN SATURATION: 100 % | RESPIRATION RATE: 18 BRPM | TEMPERATURE: 99.3 F | HEIGHT: 71 IN | HEART RATE: 68 BPM

## 2018-01-30 DIAGNOSIS — R53.1 GENERALIZED WEAKNESS: ICD-10-CM

## 2018-01-30 DIAGNOSIS — G89.29 CHRONIC ABDOMINAL PAIN: ICD-10-CM

## 2018-01-30 DIAGNOSIS — R10.9 CHRONIC ABDOMINAL PAIN: ICD-10-CM

## 2018-01-30 DIAGNOSIS — I43: ICD-10-CM

## 2018-01-30 DIAGNOSIS — Z78.9 ON TOTAL PARENTERAL NUTRITION: ICD-10-CM

## 2018-01-30 DIAGNOSIS — F90.0 ADHD (ATTENTION DEFICIT HYPERACTIVITY DISORDER), INATTENTIVE TYPE: ICD-10-CM

## 2018-01-30 DIAGNOSIS — Z90.3 STATUS POST GASTRECTOMY: Primary | ICD-10-CM

## 2018-01-30 DIAGNOSIS — Z13.6 CARDIOVASCULAR SCREENING; LDL GOAL LESS THAN 160: ICD-10-CM

## 2018-01-30 DIAGNOSIS — E63.9: ICD-10-CM

## 2018-01-30 PROCEDURE — 99214 OFFICE O/P EST MOD 30 MIN: CPT | Performed by: FAMILY MEDICINE

## 2018-01-30 RX ORDER — DEXTROAMPHETAMINE SACCHARATE, AMPHETAMINE ASPARTATE, DEXTROAMPHETAMINE SULFATE AND AMPHETAMINE SULFATE 5; 5; 5; 5 MG/1; MG/1; MG/1; MG/1
20 TABLET ORAL 2 TIMES DAILY
Qty: 60 TABLET | Refills: 0 | Status: SHIPPED | OUTPATIENT
Start: 2018-04-06 | End: 2018-05-03

## 2018-01-30 RX ORDER — DEXTROAMPHETAMINE SACCHARATE, AMPHETAMINE ASPARTATE, DEXTROAMPHETAMINE SULFATE AND AMPHETAMINE SULFATE 5; 5; 5; 5 MG/1; MG/1; MG/1; MG/1
20 TABLET ORAL 2 TIMES DAILY
Qty: 60 TABLET | Refills: 0 | Status: SHIPPED | OUTPATIENT
Start: 2018-03-07 | End: 2018-05-03

## 2018-01-30 RX ORDER — DEXTROAMPHETAMINE SACCHARATE, AMPHETAMINE ASPARTATE, DEXTROAMPHETAMINE SULFATE AND AMPHETAMINE SULFATE 5; 5; 5; 5 MG/1; MG/1; MG/1; MG/1
20 TABLET ORAL 2 TIMES DAILY
Qty: 60 TABLET | Refills: 0 | Status: SHIPPED | OUTPATIENT
Start: 2018-02-05 | End: 2018-05-03

## 2018-01-30 ASSESSMENT — PAIN SCALES - GENERAL: PAINLEVEL: NO PAIN (0)

## 2018-01-30 NOTE — MR AVS SNAPSHOT
"              After Visit Summary   1/30/2018    Parker Acevedo     MRN: [id number]           Patient Information     Date Of Birth          1970        Visit Information        Provider Department      1/30/2018 11:00 AM Esteban Daly MD [facility]        Today's Diagnoses     CARDIOVASCULAR SCREENING; LDL GOAL LESS THAN 160    -  1    ADHD (attention deficit hyperactivity disorder), inattentive type          Care Instructions    These are new changes to your current plan of care based on today's visit:    Medications stopped    Medications to be started    Change dose of this medication None   New treatments        Follow up appointments:    1.  FOLLOW UP WITH YOUR PRIMARY CARE PROVIDER: 3 month(s) for clinic visit     2. FOLLOW UP WITH SPECIALIST: As planned    3. FOLLOW UP WITH NURSE VISIT:     4. IMAGING STUDIES TO BE SCHEDULED:     5. PROCEDURES TO BE SCHEDULED:     6. LABS TO BE COMPLETED PRIOR TO NEXT VISIT: cholesterol , HDL \"Good Cholesterol\", LDL \"Bad Cholesterol\"  , triglycerides with next blood draw.    Esteban Daly MD                Follow-ups after your visit        Your next 10 appointments already scheduled     Apr 04, 2018  1:30 PM CDT   Return Visit with Patti Milligan MD   [facility]    [address] [address] [phone]              Future tests that were ordered for you today     Open Future Orders        Priority Expected Expires Ordered    Lipid panel reflex to direct LDL Fasting Routine 2/15/2018 3/29/2018 1/30/2018            Who to contact     If you have questions or need follow up information about today's clinic visit or your schedule please [facility] directly at [phone].  Normal or non-critical lab and imaging results will be communicated to you by MyChart, letter or phone within 4 business days after the clinic has received the results. " "If you do not hear from us within 7 days, please contact the clinic through [employer] or phone. If you have a critical or abnormal lab result, we will notify you by phone as soon as possible.  Submit refill requests through [employer] or call your pharmacy and they will forward the refill request to us. Please allow 3 business days for your refill to be completed.          Additional Information About Your Visit        [employer] Information     [employer] gives you secure access to your electronic health record. If you see a primary care provider, you can also send messages to your care team and make appointments. If you have questions, please call your primary care clinic.  If you do not have a primary care provider, please call [phone] and they will assist you.        Care EveryWhere ID     This is your Care EveryWhere ID. This could be used by other organizations to access your [facility] medical records  [id number]        Your Vitals Were     Pulse Temperature Respirations Height Pulse Oximetry BMI (Body Mass Index)    68 99.3  F (37.4  C) (Temporal) 18 5' 11\" (1.803 m) 100% 28.44 kg/m2       Blood Pressure from Last 3 Encounters:   01/30/18 122/76   01/17/18 136/84   01/12/18 106/65    Weight from Last 3 Encounters:   01/30/18 203 lb 14.4 oz (92.5 kg)   01/12/18 186 lb (84.4 kg)   01/12/18 186 lb (84.4 kg)                 Where to get your medicines      Some of these will need a paper prescription and others can be bought over the counter.  Ask your nurse if you have questions.     Bring a paper prescription for each of these medications     amphetamine-dextroamphetamine 20 MG per tablet    amphetamine-dextroamphetamine 20 MG per tablet    amphetamine-dextroamphetamine 20 MG per tablet          Primary Care Provider Office Phone # Fax #    Esteban Daly [phone] [address] [address]        Equal Access to Services     KATHRYN GUZMAN AH: Negra Nathan, " wakevonda tayo, qaybta kajas stock, carmela caleroaan ah. So [facility] [phone].    ATENCIÓN: Si alexis felipe, tiene a hicks disposición servicios gratuitos de asistencia lingüística. Chu al [phone].    We comply with applicable federal civil rights laws and [place] laws. We do not discriminate on the basis of race, color, national origin, age, disability, sex, sexual orientation, or gender identity.            Thank you!     Thank you for choosing [facility]  for your care. Our goal is always to provide you with excellent care. Hearing back from our patients is one way we can continue to improve our services. Please take a few minutes to complete the written survey that you may receive in the mail after your visit with us. Thank you!             Your Updated Medication List - Protect others around you: Learn how to safely use, store and throw away your medicines at www.disposemymeds.org.          This list is accurate as of 1/30/18 11:32 AM.  Always use your most recent med list.                   Brand Name Dispense Instructions for use Diagnosis    albuterol 108 (90 BASE) MCG/ACT Inhaler    PROAIR HFA/PROVENTIL HFA/VENTOLIN HFA    1 Inhaler    Inhale 2 puffs into the lungs every 4 hours as needed for shortness of breath / dyspnea or wheezing    Aspiration pneumonitis (H)       * amphetamine-dextroamphetamine 20 MG per tablet   Start taking on:  2/5/2018    ADDERALL    60 tablet    Take 1 tablet (20 mg) by mouth 2 times daily    ADHD (attention deficit hyperactivity disorder), inattentive type       * amphetamine-dextroamphetamine 20 MG per tablet   Start taking on:  3/7/2018    ADDERALL    60 tablet    Take 1 tablet (20 mg) by mouth 2 times daily    ADHD (attention deficit hyperactivity disorder), inattentive type       * amphetamine-dextroamphetamine 20 MG per tablet   Start taking on:  4/6/2018    ADDERALL    60 tablet    Take 1 tablet (20 mg) by mouth 2 times  "daily    ADHD (attention deficit hyperactivity disorder), inattentive type       COREG PO      Take 12.5 mg by mouth 2 times daily        * cyanocobalamin 1000 MCG/ML injection    VITAMIN B12     Inject 1 mL into the muscle every 30 days        * cyanocobalamin 1000 MCG/ML injection    VITAMIN B12    1 mL    Inject 1 mL (1,000 mcg) into the muscle every 30 days    Bariatric surgery status       diphenhydrAMINE 12.5 MG/5ML solution    BENADRYL    480 mL    Take 10 mLs (25 mg) by mouth 2 times daily    Itching, Rash       * [facility] PATIENT      Contact [facility] for patient specific medication information at [phone] on admission and discharge from the hospital.  Phones are answered 24 hours a day 7 days a week 365 days a year.  Providers - Choose \"CONTINUE HOME MED (no script)\" at discharge if patient treatment with home infusion will continue.    S/P bariatric surgery       * [facility] PATIENT      Contact [facility] for patient specific medication information at [phone] on admission and discharge from the hospital.  Phones are answered 24 hours a day 7 days a week 365 days a year.  Providers - Choose \"CONTINUE HOME MED (no script)\" at discharge if patient treatment with home infusion will continue.    Severe malnutrition (H)       fentaNYL 50 mcg/hr 72 hr patch    DURAGESIC    15 patch    Place 1 patch onto the skin every 48 hours MYLAN BRAND ONLY. Fill on/after 1/12/18 to start on/after 1/15/18    Chronic abdominal pain       lidocaine 5 % Patch    LIDODERM    60 patch    Place 1-2 patches onto the skin every 24 hours Wear for 12 hours, remove for 12 hours.  OK to cut to better fit to size.    Chronic bilateral low back pain without sciatica, Chronic abdominal pain, Myofascial pain       lidocaine-prilocaine cream    EMLA    30 g    Apply topically as needed for moderate pain    Pain following surgery or procedure       morphine 0.1% " "in intrasite topical gel     100 g    Apply as needed prior to accessing the port site.    Pain following surgery or procedure       MULTI-VITAMINS Tabs      Take 1 tablet by mouth        mupirocin 2 % ointment    BACTROBAN    30 g    Apply topically 3 times daily    Skin infection       naloxone nasal spray    NARCAN    0.2 mL    Spray 1 spray (4 mg) into one nostril alternating nostrils as needed for opioid reversal (every 2-3 minutes until assistance arrives.)    Encounter for long-term opiate analgesic use, Chronic abdominal pain       Needle (Disp) 27G X 1/2\" Misc    BD DISP NEEDLES    3 each    1 Device every 30 days Use for cyanocobalamin injection once q 30 days.    Bariatric surgery status       nystatin-triamcinolone cream    MYCOLOG II    60 g    Apply topically 2 times daily as needed    Skin irritation       ondansetron 8 MG ODT tab    ZOFRAN-ODT    90 tablet    Take 1 tablet (8 mg) by mouth every 8 hours as needed for nausea    Nausea       * order for DME     12 each    Injection Supplies for Vitamin B12: 3cc syringes w/ 27 gauge needles, 1/2 inch length    Bariatric surgery status       * order for DME     4 each    Equipment being ordered: Bilateral knee high chronic venous insufficiency stockings--  mild-moderate pressures.    Bilateral edema of lower extremity       oxyCODONE 5 MG/5ML solution    ROXICODONE    1350 mL    Take 10-15 mLs (10-15 mg) by mouth every 4 hours as needed for moderate to severe pain Max of 45 mg per day. Fill on/after 1/12/18 not to start untill 1/15/18.    Encounter for long-term opiate analgesic use       sucralfate 1 GM/10ML suspension    CARAFATE    1200 mL    Take 10 mLs (1 g) by mouth 4 times daily    Nausea       vitamin D 99027 UNIT capsule    ERGOCALCIFEROL    12 capsule    Take 1 capsule (50,000 Units) by mouth every 7 days    Vitamin D deficiency       * Notice:  This list has 9 medication(s) that are the same as other medications prescribed for you. Read the " directions carefully, and ask your doctor or other care provider to review them with you.

## 2018-01-30 NOTE — PATIENT INSTRUCTIONS
"These are new changes to your current plan of care based on today's visit:    Medications stopped    Medications to be started    Change dose of this medication None   New treatments        Follow up appointments:    1.  FOLLOW UP WITH YOUR PRIMARY CARE PROVIDER: 3 month(s) for clinic visit     2. FOLLOW UP WITH SPECIALIST: As planned    3. FOLLOW UP WITH NURSE VISIT:     4. IMAGING STUDIES TO BE SCHEDULED:     5. PROCEDURES TO BE SCHEDULED:     6. LABS TO BE COMPLETED PRIOR TO NEXT VISIT: cholesterol , HDL \"Good Cholesterol\", LDL \"Bad Cholesterol\"  , triglycerides with next blood draw.    Esteban Daly MD        "

## 2018-01-31 ENCOUNTER — HOSPITAL ENCOUNTER (OUTPATIENT)
Dept: LAB | Facility: CLINIC | Age: 48
Discharge: HOME | End: 2018-01-31
Attending: FAMILY MEDICINE | Admitting: FAMILY MEDICINE
Payer: COMMERCIAL

## 2018-01-31 ENCOUNTER — HOME INFUSION (PRE-WILLOW HOME INFUSION) (OUTPATIENT)
Dept: PHARMACY | Facility: CLINIC | Age: 48
End: 2018-01-31

## 2018-01-31 LAB
ALBUMIN SERPL-MCNC: 3.5 G/DL (ref 3.4–5)
ALP SERPL-CCNC: 69 U/L (ref 40–150)
ALT SERPL W P-5'-P-CCNC: 24 U/L (ref 0–70)
ANION GAP SERPL CALCULATED.3IONS-SCNC: 8 MMOL/L (ref 3–14)
AST SERPL W P-5'-P-CCNC: 12 U/L (ref 0–45)
BASOPHILS # BLD AUTO: 0 10E9/L (ref 0–0.2)
BASOPHILS NFR BLD AUTO: 0.8 %
BILIRUB DIRECT SERPL-MCNC: 0.2 MG/DL (ref 0–0.2)
BILIRUB SERPL-MCNC: 0.6 MG/DL (ref 0.2–1.3)
BUN SERPL-MCNC: 14 MG/DL (ref 7–30)
CALCIUM SERPL-MCNC: 8.3 MG/DL (ref 8.5–10.1)
CHLORIDE SERPL-SCNC: 106 MMOL/L (ref 94–109)
CO2 SERPL-SCNC: 29 MMOL/L (ref 20–32)
CREAT SERPL-MCNC: 0.66 MG/DL (ref 0.66–1.25)
DIFFERENTIAL METHOD BLD: ABNORMAL
EOSINOPHIL # BLD AUTO: 0.2 10E9/L (ref 0–0.7)
EOSINOPHIL NFR BLD AUTO: 4.6 %
ERYTHROCYTE [DISTWIDTH] IN BLOOD BY AUTOMATED COUNT: 13.6 % (ref 10–15)
GFR SERPL CREATININE-BSD FRML MDRD: >90 ML/MIN/1.7M2
GLUCOSE SERPL-MCNC: 93 MG/DL (ref 70–99)
HCT VFR BLD AUTO: 38.1 % (ref 40–53)
HGB BLD-MCNC: 12.1 G/DL (ref 13.3–17.7)
IMM GRANULOCYTES # BLD: 0 10E9/L (ref 0–0.4)
IMM GRANULOCYTES NFR BLD: 0 %
LYMPHOCYTES # BLD AUTO: 1.7 10E9/L (ref 0.8–5.3)
LYMPHOCYTES NFR BLD AUTO: 34.7 %
MAGNESIUM SERPL-MCNC: 2.1 MG/DL (ref 1.6–2.3)
MCH RBC QN AUTO: 31.2 PG (ref 26.5–33)
MCHC RBC AUTO-ENTMCNC: 31.8 G/DL (ref 31.5–36.5)
MCV RBC AUTO: 98 FL (ref 78–100)
MONOCYTES # BLD AUTO: 0.6 10E9/L (ref 0–1.3)
MONOCYTES NFR BLD AUTO: 12.2 %
NEUTROPHILS # BLD AUTO: 2.3 10E9/L (ref 1.6–8.3)
NEUTROPHILS NFR BLD AUTO: 47.7 %
PHOSPHATE SERPL-MCNC: 3.7 MG/DL (ref 2.5–4.5)
PLATELET # BLD AUTO: 163 10E9/L (ref 150–450)
POTASSIUM SERPL-SCNC: 3.8 MMOL/L (ref 3.4–5.3)
PROT SERPL-MCNC: 6.7 G/DL (ref 6.8–8.8)
RBC # BLD AUTO: 3.88 10E12/L (ref 4.4–5.9)
SODIUM SERPL-SCNC: 143 MMOL/L (ref 133–144)
TRIGL SERPL-MCNC: 54 MG/DL
WBC # BLD AUTO: 4.8 10E9/L (ref 4–11)

## 2018-01-31 PROCEDURE — 83735 ASSAY OF MAGNESIUM: CPT | Performed by: FAMILY MEDICINE

## 2018-01-31 PROCEDURE — 84100 ASSAY OF PHOSPHORUS: CPT | Performed by: FAMILY MEDICINE

## 2018-01-31 PROCEDURE — 84478 ASSAY OF TRIGLYCERIDES: CPT | Performed by: FAMILY MEDICINE

## 2018-01-31 PROCEDURE — 85025 COMPLETE CBC W/AUTO DIFF WBC: CPT | Performed by: FAMILY MEDICINE

## 2018-01-31 PROCEDURE — 80053 COMPREHEN METABOLIC PANEL: CPT | Performed by: FAMILY MEDICINE

## 2018-01-31 PROCEDURE — 84134 ASSAY OF PREALBUMIN: CPT | Performed by: FAMILY MEDICINE

## 2018-01-31 PROCEDURE — 82248 BILIRUBIN DIRECT: CPT | Performed by: FAMILY MEDICINE

## 2018-02-01 ENCOUNTER — MYC REFILL (OUTPATIENT)
Dept: FAMILY MEDICINE | Facility: CLINIC | Age: 48
End: 2018-02-01

## 2018-02-01 ENCOUNTER — MYC MEDICAL ADVICE (OUTPATIENT)
Dept: PALLIATIVE MEDICINE | Facility: CLINIC | Age: 48
End: 2018-02-01

## 2018-02-01 ENCOUNTER — HOME INFUSION (PRE-WILLOW HOME INFUSION) (OUTPATIENT)
Dept: PHARMACY | Facility: CLINIC | Age: 48
End: 2018-02-01

## 2018-02-01 DIAGNOSIS — G89.29 CHRONIC ABDOMINAL PAIN: ICD-10-CM

## 2018-02-01 DIAGNOSIS — Z79.891 ENCOUNTER FOR LONG-TERM OPIATE ANALGESIC USE: ICD-10-CM

## 2018-02-01 DIAGNOSIS — R10.9 CHRONIC ABDOMINAL PAIN: ICD-10-CM

## 2018-02-01 DIAGNOSIS — R11.0 NAUSEA: ICD-10-CM

## 2018-02-01 DIAGNOSIS — Z98.84 BARIATRIC SURGERY STATUS: ICD-10-CM

## 2018-02-01 DIAGNOSIS — E55.9 VITAMIN D DEFICIENCY: ICD-10-CM

## 2018-02-01 LAB — PREALB SERPL IA-MCNC: 27 MG/DL (ref 15–45)

## 2018-02-01 RX ORDER — ONDANSETRON 8 MG/1
8 TABLET, ORALLY DISINTEGRATING ORAL EVERY 8 HOURS PRN
Qty: 90 TABLET | Refills: 3 | Status: CANCELLED | OUTPATIENT
Start: 2018-02-01

## 2018-02-01 RX ORDER — CYANOCOBALAMIN 1000 UG/ML
1 INJECTION, SOLUTION INTRAMUSCULAR; SUBCUTANEOUS
Qty: 1 ML | Refills: 11 | Status: CANCELLED | OUTPATIENT
Start: 2018-02-01

## 2018-02-01 RX ORDER — ERGOCALCIFEROL 1.25 MG/1
50000 CAPSULE, LIQUID FILLED ORAL
Qty: 12 CAPSULE | Refills: 3 | Status: CANCELLED | OUTPATIENT
Start: 2018-02-01

## 2018-02-01 NOTE — PROGRESS NOTES
This is a recent snapshot of the patient's [facility] medical record.  For current drug dose and complete information and questions, call [phone] or In Basket pool, fv home infusion ([id number])  CSN Number:  [id number]

## 2018-02-02 RX ORDER — FENTANYL 50 UG/1
1 PATCH TRANSDERMAL
Qty: 15 PATCH | Refills: 0 | Status: SHIPPED | OUTPATIENT
Start: 2018-02-02 | End: 2018-02-23

## 2018-02-02 RX ORDER — OXYCODONE HCL 5 MG/5 ML
10-15 SOLUTION, ORAL ORAL EVERY 4 HOURS PRN
Qty: 1350 ML | Refills: 0 | Status: SHIPPED | OUTPATIENT
Start: 2018-02-02 | End: 2018-02-23

## 2018-02-02 NOTE — TELEPHONE ENCOUNTER
Scripts mailed to pharmacy. Patient notified.    YADIRA LazarN, RN  Care Coordinator  [facility]

## 2018-02-02 NOTE — TELEPHONE ENCOUNTER
Received call from patient requesting refill(s) of fentaNYL (DURAGESIC) 50 mcg/hr 72 hr patch 1/18/18 AND oxyCODONE (ROXICODONE) 5 MG/5ML solution 1/12/18    Last picked up from pharmacy on     Pt last seen by prescribing provider on 1/3/18  Next appt scheduled for 4/4/18    Last urine drug screen date 4/5/17  Current opioid agreement on file (completed within the last year) Yes Date of opioid agreement: 12/17/17    Processing (pick one and delete the others):  Mail to   [facility] [place], [place] - [address] [place] [id number]  Phone: [phone] Fax: [phone]    Corky Chatman MA  [facility]    Will route to nursing pool for review and preparation of prescription(s).

## 2018-02-02 NOTE — TELEPHONE ENCOUNTER
Medication refill information reviewed.     Due date for fentaNYL (DURAGESIC) 50 mcg/hr 72 hr patch AND oxyCODONE (ROXICODONE) 5 MG/5ML solution   is 2/14/18     Prescriptions prepped for review.     Will route to provider.

## 2018-02-02 NOTE — TELEPHONE ENCOUNTER
"vitamin D (ERGOCALCIFEROL) 69762 UNIT capsule  Rx was sent 10/03/2017 for 12 tabs and 3 refills.   Pharmacy notified via E-prescribe refusal.    cyanocobalamin (VITAMIN B12) 1000 MCG/ML injection  Rx was sent 11/03/2017 for 1mL and 11 refills. Patient should have medication through 11/2018.  Pharmacy notified via E-prescribe refusal.    Needle, Disp, (BD DISP NEEDLES) 27G X 1/2\" MISC (for vitamin B12 injections)  Rx was sent 11/03/2017 for 3 each and 4 refills.   Pharmacy notified via E-prescribe refusal.    ondansetron (ZOFRAN-ODT) 8 MG ODT tab  Rx was sent 11/03/2017 for 90 tabs and 3 refills.   Pharmacy notified via E-prescribe refusal.    fentaNYL (DURAGESIC) 50 mcg/hr 72 hr patch  Rx was sent 01/03/2018 for 15 patch and 0 refills.     Carvedilol (COREG PO)  Receives through outside provider.    Confirmed with pharmacy, patient has available refill on all of the above medications. Patient informed.  Melva Alfredo, RN, BSN           "

## 2018-02-02 NOTE — TELEPHONE ENCOUNTER
"Message from MyChart:  Original authorizing provider: MD Parker Camara Sr would like a refill of the following medications:  vitamin D (ERGOCALCIFEROL) 73828 UNIT capsule [Esteban aDly MD]  Needle, Disp, (BD DISP NEEDLES) 27G X 1/2\" MISC [Esteban Daly MD]  ondansetron (ZOFRAN-ODT) 8 MG ODT tab [Esteban Daly MD]  cyanocobalamin (VITAMIN B12) 1000 MCG/ML injection [Esteban Daly MD]    Preferred pharmacy: [facility] [place], [place] - [address]    Comment:  Will also  federal and carvedilol all on the 5th of February at 1pm any questions feel free to call us at [phone] thank you  "

## 2018-02-02 NOTE — PROGRESS NOTES
This is a recent snapshot of the patient's [facility] medical record.  For current drug dose and complete information and questions, call [phone] or In Basket pool, fv home infusion ([id number])  CSN Number:  [id number]

## 2018-02-02 NOTE — TELEPHONE ENCOUNTER
Signed Prescriptions:                        Disp   Refills    fentaNYL (DURAGESIC) 50 mcg/hr 72 hr patch 15 pat*0        Sig: Place 1 patch onto the skin every 48 hours MYLAN           BRAND ONLY. Fill on/after 2/11/18 to start           on/after 2/14/18  Authorizing Provider: BRANDYN JENKINS    oxyCODONE (ROXICODONE) 5 MG/5ML solution   1350 mL0        Sig: Take 10-15 mLs (10-15 mg) by mouth every 4 hours as           needed for moderate to severe pain Max of 45 mg           per day. Fill on/after 2/11/18 not to start           untill 2/14/18.  Authorizing Provider: BRANDYN JENKINS MD  [facility]

## 2018-02-04 ENCOUNTER — HOME INFUSION (PRE-WILLOW HOME INFUSION) (OUTPATIENT)
Dept: PHARMACY | Facility: CLINIC | Age: 48
End: 2018-02-04

## 2018-02-05 ENCOUNTER — HOME INFUSION (PRE-WILLOW HOME INFUSION) (OUTPATIENT)
Dept: PHARMACY | Facility: CLINIC | Age: 48
End: 2018-02-05

## 2018-02-06 ENCOUNTER — HOME INFUSION (PRE-WILLOW HOME INFUSION) (OUTPATIENT)
Dept: PHARMACY | Facility: CLINIC | Age: 48
End: 2018-02-06

## 2018-02-06 ENCOUNTER — CARE COORDINATION (OUTPATIENT)
Dept: SURGERY | Facility: CLINIC | Age: 48
End: 2018-02-06

## 2018-02-06 ENCOUNTER — TELEPHONE (OUTPATIENT)
Dept: FAMILY MEDICINE | Facility: CLINIC | Age: 48
End: 2018-02-06

## 2018-02-06 ENCOUNTER — OFFICE VISIT (OUTPATIENT)
Dept: FAMILY MEDICINE | Facility: CLINIC | Age: 48
End: 2018-02-06
Payer: COMMERCIAL

## 2018-02-06 VITALS
SYSTOLIC BLOOD PRESSURE: 132 MMHG | HEART RATE: 74 BPM | DIASTOLIC BLOOD PRESSURE: 84 MMHG | TEMPERATURE: 99.5 F | RESPIRATION RATE: 16 BRPM | OXYGEN SATURATION: 98 %

## 2018-02-06 DIAGNOSIS — I43: ICD-10-CM

## 2018-02-06 DIAGNOSIS — L03.90 CELLULITIS, UNSPECIFIED CELLULITIS SITE: Primary | ICD-10-CM

## 2018-02-06 DIAGNOSIS — E63.9: ICD-10-CM

## 2018-02-06 DIAGNOSIS — F17.200 TOBACCO USE DISORDER: ICD-10-CM

## 2018-02-06 PROCEDURE — 96372 THER/PROPH/DIAG INJ SC/IM: CPT | Performed by: FAMILY MEDICINE

## 2018-02-06 PROCEDURE — 99214 OFFICE O/P EST MOD 30 MIN: CPT | Mod: 25 | Performed by: FAMILY MEDICINE

## 2018-02-06 RX ORDER — LEVOFLOXACIN 500 MG/1
500 TABLET, FILM COATED ORAL DAILY
Qty: 10 TABLET | Refills: 0 | Status: SHIPPED | OUTPATIENT
Start: 2018-02-06 | End: 2018-06-01

## 2018-02-06 ASSESSMENT — PAIN SCALES - GENERAL: PAINLEVEL: MODERATE PAIN (4)

## 2018-02-06 NOTE — PROGRESS NOTES
This is a recent snapshot of the patient's [facility] medical record.  For current drug dose and complete information and questions, call [phone] or In Basket pool, fv home infusion ([id number])  CSN Number:  [id number]

## 2018-02-06 NOTE — PROGRESS NOTES
This is a recent snapshot of the patient's [facility] medical record.  For current drug dose and complete information and questions, call [phone] or In Basket pool, fv home infusion ([id number])  CSN Number:  [id number]

## 2018-02-06 NOTE — PROGRESS NOTES
SUBJECTIVE:   Parker Acevedo Sr is a 47 year old male who presents to clinic today for the following health issues:      HPI  Patient noticed infection around port as of yesterday 2/5. Been running low grade temperature since.     This patient has a Cm catheter in his right chest and apparently has noted some increased redness surrounding the entry point of the catheter and silk stitches.  His wife since there was some drainage yesterday.  No chills or major myalgias but does note temperature near 100 .  No respiratory changes.      Problem list and histories reviewed & adjusted, as indicated.  Additional history: as documented            ROS:  CONSTITUTIONAL: Slight rise in temperature to near 100  but no chills.  No major weight changes.  E: NEGATIVE for vision changes or irritation  E/M: NEGATIVE for ear, mouth and throat problems  R: NEGATIVE for significant cough or SOB  CV: NEGATIVE for chest pain, palpitations or peripheral edema.  Is on a beta-blocker for noted cardiomyopathy due to nutritional causes.  GI: No change post surgery.  Was unable to tolerate tube feedings to small intestine did not have adequate motility.  : NEGATIVE for frequency, dysuria, or hematuria  M: NEGATIVE for significant arthralgias or myalgia  N: NEGATIVE for weakness, dizziness or paresthesias  E: NEGATIVE for temperature intolerance, skin/hair changes  P: NEGATIVE for changes in mood or affect    OBJECTIVE:                                                    /84  Pulse 74  Temp 99.5  F (37.5  C) (Temporal)  Resp 16  SpO2 98%  There is no height or weight on file to calculate BMI.  GENERAL: alert, no distress and cooperative  HENT: ear canals- normal; TMs- normal; Nose- normal; Mouth- no ulcers, no lesions  NECK: no tenderness, no adenopathy, no asymmetry, no masses, no stiffness; thyroid- normal to palpation  RESP: lungs clear to auscultation - no rales, no rhonchi, no wheezes  CV: regular rates and rhythm,  normal S1 S2, no S3 or S4 and no murmur, no click or rub -  ABDOMEN: soft, no tenderness, no  hepatosplenomegaly, no masses, normal bowel sounds  ABDOMEN: Small gastric tube noted in the right upper quadrant of the abdomen with no drainage.  MS: extremities- no gross deformities noted, no edema  SKIN: Is approximately 5 mm of redness without drainage surrounding the entry point of the Cm catheter.  Appears to be more noninfectious irritation.  No active drainage noted at this time.  NEURO: strength and tone- normal, sensory exam- grossly normal, mentation- intact, speech- normal, reflexes- symmetric  PSYCH: Alert and oriented times 3; speech- coherent , normal rate and volume; able to articulate logical thoughts, able to abstract reason, no tangential thoughts, no hallucinations or delusions, affect- normal    Diagnostic test results:  Diagnostic Test Results:  none      ASSESSMENT/PLAN:                                                    1. Cellulitis, unspecified cellulitis site  May be mostly due to noninfectious irritation from the Cm catheter but even the fact there was some drainage yesterday and is frequent infections, will treat empirically with oral Levaquin.  He has multiple allergies limiting the choice of antibiotics.  - levofloxacin (LEVAQUIN) 500 MG tablet; Take 1 tablet (500 mg) by mouth daily  Dispense: 10 tablet; Refill: 0    2. 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 Tobacco use disorder  Advised to discontinue all tobacco.  - TOBACCO CESSATION ORDER FOR HM      Follow up with Provider -follow-up in 2-3 months as previously planned for her quarterly visit.  See Patient Instructions    The patient understood the rational for the diagnosis and treatment plan. All questions were answered to best of my ability and the patient's satisfaction.    Note: Chart documentation done in part with Dragon Voice Recognition software. Although reviewed after  completion, some word and grammatical errors may remain.  Please consider this when interpreting information in this chart.    Esteban Daly MD  [facility]    The following medication was given:     MEDICATION: Vitamin B12  1000 mcg  ROUTE: IM  SITE: Deltoid - Left  DOSE: 1 mL  LOT #: [id number]   :  [employer]  EXPIRATION DATE:  050119  NDC#: 3500-7042-04

## 2018-02-06 NOTE — TELEPHONE ENCOUNTER
Reason for call:  Same Day Appointment  Reason for Call:  Same Day Appointment, Requested Provider:  Esteban Daly MD    PCP: Esteban Daly    Reason for visit: infection around port on his chest    Duration of symptoms: last night    Have you been treated for this in the past? Yes, pt gets a lot of infections    Additional comments:  wife is calling to see if  can see him today because she had noticed some pus around the port and he is prone to infections. Only wants to see . Please advise    Can we leave a detailed message on this number? YES    Phone number patient can be reached at: Other phone number:  [phone]    Best Time: anytime    Call taken on 2/6/2018 at 8:18 AM by Miri Padgett

## 2018-02-06 NOTE — MR AVS SNAPSHOT
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 directly at [phone].  Normal or non-critical lab and imaging results will be communicated to you by MyChart, letter or phone within 4 business days after the clinic has received the results. If you do not hear from us within 7 days, please contact the clinic through [employer] or phone. If you have a critical or abnormal lab result, we will notify you by phone as soon as possible.  Submit refill requests through [employer] or call your pharmacy and they will forward the refill request to us. Please allow 3 business days for your refill to be completed.          Additional Information About Your Visit        MyChart Information     [employer] gives you secure access to your electronic health record. If you see a primary care provider, you can also send messages to your care team and make appointments. If you have questions, please call your primary care clinic.  If you do not have a primary care provider, please call [phone] and they will assist you.        Care EveryWhere ID     This is your Care EveryWhere ID. This could be used by other  organizations to access your [place] medical records  [id number]        Your Vitals Were     Pulse Temperature Respirations Pulse Oximetry          74 99.5  F (37.5  C) (Temporal) 16 98%         Blood Pressure from Last 3 Encounters:   02/06/18 132/84   01/30/18 122/76   01/17/18 136/84    Weight from Last 3 Encounters:   01/30/18 203 lb 14.4 oz (92.5 kg)   01/12/18 186 lb (84.4 kg)   01/12/18 186 lb (84.4 kg)              We Performed the Following     ADMIN 1st VACCINE     TOBACCO CESSATION ORDER FOR      VITAMIN B12 INJ /1000MCG          Today's Medication Changes          These changes are accurate as of 2/6/18  2:16 PM.  If you have any questions, ask your nurse or doctor.               Start taking these medicines.        Dose/Directions    levofloxacin 500 MG tablet   Commonly known as:  LEVAQUIN   Used for:  Cellulitis, unspecified cellulitis site   Started by:  Esteban Daly MD        Dose:  500 mg   Take 1 tablet (500 mg) by mouth daily   Quantity:  10 tablet   Refills:  0            Where to get your medicines      These medications were sent to [facility] [address]  [address], [facility] [address]     Phone:  [phone]     levofloxacin 500 MG tablet                Primary Care Provider Office Phone # Fax #    Esteban Daly [phone] [address] [address]        Equal Access to Services     [facility] Hospital[facility] AH: Hadii kameron fregosoo Soheike, waaxda luqadaha, qaybta kaalmada karlaegyada, waxkayleigh eligio rizvi. So [facility] [phone].    ATENCIÓN: Si habla español, tiene a hicks disposición servicios gratuitos de asistencia lingüística. Llkody al [phone].    We comply with applicable federal civil rights laws and [place] laws. We do not discriminate on the basis of race, color, national origin, age, disability, sex, sexual orientation, or gender identity.            Thank you!     Thank  you for choosing [facility]  for your care. Our goal is always to provide you with excellent care. Hearing back from our patients is one way we can continue to improve our services. Please take a few minutes to complete the written survey that you may receive in the mail after your visit with us. Thank you!             Your Updated Medication List - Protect others around you: Learn how to safely use, store and throw away your medicines at www.disposemymeds.org.          This list is accurate as of 2/6/18  2:16 PM.  Always use your most recent med list.                   Brand Name Dispense Instructions for use Diagnosis    albuterol 108 (90 BASE) MCG/ACT Inhaler    PROAIR HFA/PROVENTIL HFA/VENTOLIN HFA    1 Inhaler    Inhale 2 puffs into the lungs every 4 hours as needed for shortness of breath / dyspnea or wheezing    Aspiration pneumonitis (H)       * amphetamine-dextroamphetamine 20 MG per tablet    ADDERALL    60 tablet    Take 1 tablet (20 mg) by mouth 2 times daily    ADHD (attention deficit hyperactivity disorder), inattentive type       * amphetamine-dextroamphetamine 20 MG per tablet   Start taking on:  3/7/2018    ADDERALL    60 tablet    Take 1 tablet (20 mg) by mouth 2 times daily    ADHD (attention deficit hyperactivity disorder), inattentive type       * amphetamine-dextroamphetamine 20 MG per tablet   Start taking on:  4/6/2018    ADDERALL    60 tablet    Take 1 tablet (20 mg) by mouth 2 times daily    ADHD (attention deficit hyperactivity disorder), inattentive type       COREG PO      Take 12.5 mg by mouth 2 times daily        * cyanocobalamin 1000 MCG/ML injection    VITAMIN B12     Inject 1 mL into the muscle every 30 days        * cyanocobalamin 1000 MCG/ML injection    VITAMIN B12    1 mL    Inject 1 mL (1,000 mcg) into the muscle every 30 days    Bariatric surgery status       diphenhydrAMINE 12.5 MG/5ML solution    BENADRYL    480 mL    Take 10 mLs (25 mg) by mouth 2 times daily  "   Itching, Rash       * [facility]      Contact [facility] for patient specific medication information at [phone] on admission and discharge from the hospital.  Phones are answered 24 hours a day 7 days a week 365 days a year.  Providers - Choose \"CONTINUE HOME MED (no script)\" at discharge if patient treatment with home infusion will continue.    S/P bariatric surgery       * [facility]      Contact [facility] for patient specific medication information at [phone] on admission and discharge from the hospital.  Phones are answered 24 hours a day 7 days a week 365 days a year.  Providers - Choose \"CONTINUE HOME MED (no script)\" at discharge if patient treatment with home infusion will continue.    Severe malnutrition (H)       fentaNYL 50 mcg/hr 72 hr patch    DURAGESIC    15 patch    Place 1 patch onto the skin every 48 hours MYLAN BRAND ONLY. Fill on/after 2/11/18 to start on/after 2/14/18    Chronic abdominal pain       levofloxacin 500 MG tablet    LEVAQUIN    10 tablet    Take 1 tablet (500 mg) by mouth daily    Cellulitis, unspecified cellulitis site       lidocaine 5 % Patch    LIDODERM    60 patch    Place 1-2 patches onto the skin every 24 hours Wear for 12 hours, remove for 12 hours.  OK to cut to better fit to size.    Chronic bilateral low back pain without sciatica, Chronic abdominal pain, Myofascial pain       lidocaine-prilocaine cream    EMLA    30 g    Apply topically as needed for moderate pain    Pain following surgery or procedure       morphine 0.1% in intrasite topical gel     100 g    Apply as needed prior to accessing the port site.    Pain following surgery or procedure       MULTI-VITAMINS Tabs      Take 1 tablet by mouth        mupirocin 2 % ointment    BACTROBAN    30 g    Apply topically 3 times daily    Skin infection       naloxone nasal spray    NARCAN    0.2 mL    Spray 1 spray (4 mg) into " "one nostril alternating nostrils as needed for opioid reversal (every 2-3 minutes until assistance arrives.)    Encounter for long-term opiate analgesic use, Chronic abdominal pain       Needle (Disp) 27G X 1/2\" Misc    BD DISP NEEDLES    3 each    1 Device every 30 days Use for cyanocobalamin injection once q 30 days.    Bariatric surgery status       nystatin-triamcinolone cream    MYCOLOG II    60 g    Apply topically 2 times daily as needed    Skin irritation       ondansetron 8 MG ODT tab    ZOFRAN-ODT    90 tablet    Take 1 tablet (8 mg) by mouth every 8 hours as needed for nausea    Nausea       * order for DME     12 each    Injection Supplies for Vitamin B12: 3cc syringes w/ 27 gauge needles, 1/2 inch length    Bariatric surgery status       * order for DME     4 each    Equipment being ordered: Bilateral knee high chronic venous insufficiency stockings--  mild-moderate pressures.    Bilateral edema of lower extremity       oxyCODONE 5 MG/5ML solution    ROXICODONE    1350 mL    Take 10-15 mLs (10-15 mg) by mouth every 4 hours as needed for moderate to severe pain Max of 45 mg per day. Fill on/after 2/11/18 not to start untill 2/14/18.    Encounter for long-term opiate analgesic use       sucralfate 1 GM/10ML suspension    CARAFATE    1200 mL    Take 10 mLs (1 g) by mouth 4 times daily    Nausea       vitamin D 71047 UNIT capsule    ERGOCALCIFEROL    12 capsule    Take 1 capsule (50,000 Units) by mouth every 7 days    Vitamin D deficiency       * Notice:  This list has 9 medication(s) that are the same as other medications prescribed for you. Read the directions carefully, and ask your doctor or other care provider to review them with you.      "

## 2018-02-06 NOTE — PROGRESS NOTES
Lilliam pt / orders  Received: Today       Arabella Diallo, RN  Lex, Kamala MAZARIEGOS, RN                   [place], [facility] [phone] calling Dr. Vyas for orders.  Parker's wife called them this AM and thinks he may have a skin infection around the site of his azevedo catheter.  On TPN, tends to run low grade temps.  History of multiple central line infections.  They advised ED evaluation.  Patient does not like that idea.  They are calling for orders?  If Dr. Vyas willing they could do site and central line cultures?     Thanks, aKcie Anderson per Dr. Vyas to order the above recommended testing.

## 2018-02-07 ENCOUNTER — HOME INFUSION (PRE-WILLOW HOME INFUSION) (OUTPATIENT)
Dept: PHARMACY | Facility: CLINIC | Age: 48
End: 2018-02-07

## 2018-02-08 NOTE — PROGRESS NOTES
This is a recent snapshot of the patient's [facility] medical record.  For current drug dose and complete information and questions, call [phone] or In Basket pool, fv home infusion ([id number])  CSN Number:  [id number]

## 2018-02-08 NOTE — PROGRESS NOTES
This is a recent snapshot of the patient's [facility] medical record.  For current drug dose and complete information and questions, call [phone] or In Basket pool, fv home infusion ([id number])  CSN Number:  [id number]

## 2018-02-09 ENCOUNTER — HOME INFUSION (PRE-WILLOW HOME INFUSION) (OUTPATIENT)
Dept: PHARMACY | Facility: CLINIC | Age: 48
End: 2018-02-09

## 2018-02-12 ENCOUNTER — HOSPITAL ENCOUNTER (OUTPATIENT)
Dept: LAB | Facility: CLINIC | Age: 48
Discharge: HOME | End: 2018-02-12
Attending: SURGERY | Admitting: SURGERY
Payer: COMMERCIAL

## 2018-02-12 ENCOUNTER — HOME INFUSION (PRE-WILLOW HOME INFUSION) (OUTPATIENT)
Dept: PHARMACY | Facility: CLINIC | Age: 48
End: 2018-02-12

## 2018-02-12 LAB
ALBUMIN SERPL-MCNC: 3.9 G/DL (ref 3.4–5)
ALP SERPL-CCNC: 76 U/L (ref 40–150)
ALT SERPL W P-5'-P-CCNC: 33 U/L (ref 0–70)
ANION GAP SERPL CALCULATED.3IONS-SCNC: 6 MMOL/L (ref 3–14)
AST SERPL W P-5'-P-CCNC: 13 U/L (ref 0–45)
BASOPHILS # BLD AUTO: 0 10E9/L (ref 0–0.2)
BASOPHILS NFR BLD AUTO: 0.3 %
BILIRUB DIRECT SERPL-MCNC: 0.1 MG/DL (ref 0–0.2)
BILIRUB SERPL-MCNC: 0.5 MG/DL (ref 0.2–1.3)
BUN SERPL-MCNC: 18 MG/DL (ref 7–30)
CALCIUM SERPL-MCNC: 8.8 MG/DL (ref 8.5–10.1)
CHLORIDE SERPL-SCNC: 107 MMOL/L (ref 94–109)
CHOLEST SERPL-MCNC: 98 MG/DL
CO2 SERPL-SCNC: 29 MMOL/L (ref 20–32)
CREAT SERPL-MCNC: 0.85 MG/DL (ref 0.66–1.25)
CRP SERPL-MCNC: <2.9 MG/L (ref 0–8)
DIFFERENTIAL METHOD BLD: NORMAL
EOSINOPHIL # BLD AUTO: 0.1 10E9/L (ref 0–0.7)
EOSINOPHIL NFR BLD AUTO: 1.4 %
ERYTHROCYTE [DISTWIDTH] IN BLOOD BY AUTOMATED COUNT: 13.8 % (ref 10–15)
FERRITIN SERPL-MCNC: 111 NG/ML (ref 26–388)
GFR SERPL CREATININE-BSD FRML MDRD: >90 ML/MIN/1.7M2
GLUCOSE SERPL-MCNC: 79 MG/DL (ref 70–99)
HCT VFR BLD AUTO: 42.8 % (ref 40–53)
HGB BLD-MCNC: 13.9 G/DL (ref 13.3–17.7)
IMM GRANULOCYTES # BLD: 0 10E9/L (ref 0–0.4)
IMM GRANULOCYTES NFR BLD: 0.2 %
IRON SERPL-MCNC: 87 UG/DL (ref 35–180)
LYMPHOCYTES # BLD AUTO: 2.7 10E9/L (ref 0.8–5.3)
LYMPHOCYTES NFR BLD AUTO: 30 %
MAGNESIUM SERPL-MCNC: 2.2 MG/DL (ref 1.6–2.3)
MCH RBC QN AUTO: 31.4 PG (ref 26.5–33)
MCHC RBC AUTO-ENTMCNC: 32.5 G/DL (ref 31.5–36.5)
MCV RBC AUTO: 97 FL (ref 78–100)
MONOCYTES # BLD AUTO: 1.1 10E9/L (ref 0–1.3)
MONOCYTES NFR BLD AUTO: 12.2 %
NEUTROPHILS # BLD AUTO: 5 10E9/L (ref 1.6–8.3)
NEUTROPHILS NFR BLD AUTO: 55.9 %
PHOSPHATE SERPL-MCNC: 4.4 MG/DL (ref 2.5–4.5)
PLATELET # BLD AUTO: 167 10E9/L (ref 150–450)
POTASSIUM SERPL-SCNC: 4.2 MMOL/L (ref 3.4–5.3)
PROT SERPL-MCNC: 7.3 G/DL (ref 6.8–8.8)
RBC # BLD AUTO: 4.43 10E12/L (ref 4.4–5.9)
SODIUM SERPL-SCNC: 142 MMOL/L (ref 133–144)
TRIGL SERPL-MCNC: 133 MG/DL
WBC # BLD AUTO: 9 10E9/L (ref 4–11)

## 2018-02-12 PROCEDURE — 82728 ASSAY OF FERRITIN: CPT | Performed by: SURGERY

## 2018-02-12 PROCEDURE — 84255 ASSAY OF SELENIUM: CPT | Performed by: SURGERY

## 2018-02-12 PROCEDURE — 82248 BILIRUBIN DIRECT: CPT | Performed by: SURGERY

## 2018-02-12 PROCEDURE — 83735 ASSAY OF MAGNESIUM: CPT | Performed by: SURGERY

## 2018-02-12 PROCEDURE — 83540 ASSAY OF IRON: CPT | Performed by: SURGERY

## 2018-02-12 PROCEDURE — 84630 ASSAY OF ZINC: CPT | Performed by: SURGERY

## 2018-02-12 PROCEDURE — 82465 ASSAY BLD/SERUM CHOLESTEROL: CPT | Performed by: SURGERY

## 2018-02-12 PROCEDURE — 85025 COMPLETE CBC W/AUTO DIFF WBC: CPT | Performed by: SURGERY

## 2018-02-12 PROCEDURE — 83785 ASSAY OF MANGANESE: CPT | Performed by: SURGERY

## 2018-02-12 PROCEDURE — 80053 COMPREHEN METABOLIC PANEL: CPT | Performed by: SURGERY

## 2018-02-12 PROCEDURE — 82525 ASSAY OF COPPER: CPT | Performed by: SURGERY

## 2018-02-12 PROCEDURE — 84134 ASSAY OF PREALBUMIN: CPT | Performed by: SURGERY

## 2018-02-12 PROCEDURE — 86140 C-REACTIVE PROTEIN: CPT | Performed by: SURGERY

## 2018-02-12 PROCEDURE — 84100 ASSAY OF PHOSPHORUS: CPT | Performed by: SURGERY

## 2018-02-12 PROCEDURE — 84478 ASSAY OF TRIGLYCERIDES: CPT | Performed by: SURGERY

## 2018-02-12 NOTE — PROGRESS NOTES
This is a recent snapshot of the patient's [facility] medical record.  For current drug dose and complete information and questions, call [phone] or In Basket pool, fv home infusion ([id number])  CSN Number:  [id number]

## 2018-02-13 ENCOUNTER — HOME INFUSION (PRE-WILLOW HOME INFUSION) (OUTPATIENT)
Dept: PHARMACY | Facility: CLINIC | Age: 48
End: 2018-02-13

## 2018-02-13 LAB — PREALB SERPL IA-MCNC: 31 MG/DL (ref 15–45)

## 2018-02-14 ENCOUNTER — HOME INFUSION (PRE-WILLOW HOME INFUSION) (OUTPATIENT)
Dept: PHARMACY | Facility: CLINIC | Age: 48
End: 2018-02-14

## 2018-02-14 NOTE — PROGRESS NOTES
This is a recent snapshot of the patient's [facility] medical record.  For current drug dose and complete information and questions, call [phone] or In Basket pool, fv home infusion ([id number])  CSN Number:  [id number]

## 2018-02-14 NOTE — PROGRESS NOTES
This is a recent snapshot of the patient's [facility] medical record.  For current drug dose and complete information and questions, call [phone] or In Basket pool, fv home infusion ([id number])  CSN Number:  [id number]

## 2018-02-15 NOTE — PROGRESS NOTES
This is a recent snapshot of the patient's [facility] medical record.  For current drug dose and complete information and questions, call [phone] or In Basket pool, fv home infusion ([id number])  CSN Number:  [id number]

## 2018-02-16 ENCOUNTER — HOSPITAL ENCOUNTER (OUTPATIENT)
Dept: LAB | Facility: CLINIC | Age: 48
Discharge: HOME | End: 2018-02-16
Attending: SURGERY | Admitting: SURGERY
Payer: COMMERCIAL

## 2018-02-16 ENCOUNTER — HOME INFUSION (PRE-WILLOW HOME INFUSION) (OUTPATIENT)
Dept: PHARMACY | Facility: CLINIC | Age: 48
End: 2018-02-16

## 2018-02-16 LAB
CRP SERPL-MCNC: 8.2 MG/L (ref 0–8)
FERRITIN SERPL-MCNC: 143 NG/ML (ref 26–388)
IRON SERPL-MCNC: 65 UG/DL (ref 35–180)

## 2018-02-16 PROCEDURE — 84630 ASSAY OF ZINC: CPT | Performed by: SURGERY

## 2018-02-16 PROCEDURE — 82525 ASSAY OF COPPER: CPT | Performed by: SURGERY

## 2018-02-16 PROCEDURE — 83785 ASSAY OF MANGANESE: CPT | Performed by: SURGERY

## 2018-02-16 PROCEDURE — 86140 C-REACTIVE PROTEIN: CPT | Performed by: SURGERY

## 2018-02-16 PROCEDURE — 84255 ASSAY OF SELENIUM: CPT | Performed by: SURGERY

## 2018-02-16 PROCEDURE — 83540 ASSAY OF IRON: CPT | Performed by: SURGERY

## 2018-02-16 PROCEDURE — 82728 ASSAY OF FERRITIN: CPT | Performed by: SURGERY

## 2018-02-19 LAB
COPPER SERPL-MCNC: 117 UG/DL (ref 70–140)
SELENIUM SERPL-MCNC: 123 UG/L (ref 23–190)
ZINC SERPL-MCNC: 77 UG/DL (ref 60–120)

## 2018-02-20 ENCOUNTER — HOME INFUSION (PRE-WILLOW HOME INFUSION) (OUTPATIENT)
Dept: PHARMACY | Facility: CLINIC | Age: 48
End: 2018-02-20

## 2018-02-21 NOTE — PROGRESS NOTES
This is a recent snapshot of the patient's [facility] medical record.  For current drug dose and complete information and questions, call [phone] or In Basket pool, fv home infusion ([id number])  CSN Number:  [id number]

## 2018-02-21 NOTE — PROGRESS NOTES
This is a recent snapshot of the patient's [facility] medical record.  For current drug dose and complete information and questions, call [phone] or In Basket pool, fv home infusion ([id number])  CSN Number:  [id number]

## 2018-02-22 LAB — MANGANESE SERPL-MCNC: <1 UG/L (ref 0–2)

## 2018-02-23 ENCOUNTER — HOME INFUSION (PRE-WILLOW HOME INFUSION) (OUTPATIENT)
Dept: PHARMACY | Facility: CLINIC | Age: 48
End: 2018-02-23

## 2018-02-23 ENCOUNTER — CARE COORDINATION (OUTPATIENT)
Dept: CARE COORDINATION | Facility: CLINIC | Age: 48
End: 2018-02-23

## 2018-02-23 ENCOUNTER — MYC MEDICAL ADVICE (OUTPATIENT)
Dept: PALLIATIVE MEDICINE | Facility: CLINIC | Age: 48
End: 2018-02-23

## 2018-02-23 DIAGNOSIS — R10.9 CHRONIC ABDOMINAL PAIN: ICD-10-CM

## 2018-02-23 DIAGNOSIS — Z79.891 ENCOUNTER FOR LONG-TERM OPIATE ANALGESIC USE: ICD-10-CM

## 2018-02-23 DIAGNOSIS — G89.29 CHRONIC ABDOMINAL PAIN: ICD-10-CM

## 2018-02-23 RX ORDER — OXYCODONE HCL 5 MG/5 ML
10-15 SOLUTION, ORAL ORAL EVERY 4 HOURS PRN
Qty: 1350 ML | Refills: 0 | Status: SHIPPED | OUTPATIENT
Start: 2018-02-23 | End: 2018-04-04

## 2018-02-23 RX ORDER — FENTANYL 50 UG/1
1 PATCH TRANSDERMAL
Qty: 15 PATCH | Refills: 0 | Status: SHIPPED | OUTPATIENT
Start: 2018-02-23 | End: 2018-04-04

## 2018-02-23 RX ORDER — FENTANYL 50 UG/1
1 PATCH TRANSDERMAL
Qty: 15 PATCH | Refills: 0 | Status: SHIPPED | OUTPATIENT
Start: 2018-02-23 | End: 2018-02-23

## 2018-02-23 RX ORDER — OXYCODONE HCL 5 MG/5 ML
10-15 SOLUTION, ORAL ORAL EVERY 4 HOURS PRN
Qty: 1350 ML | Refills: 0 | Status: SHIPPED | OUTPATIENT
Start: 2018-02-23 | End: 2018-02-23

## 2018-02-23 NOTE — TELEPHONE ENCOUNTER
Talked with patient and wife  He was hospitalized for infection  He is back down to oxycodone 45mg/day.  Welcome to discussing further at appt in April.    He has narcan at home.    Advised would send scripts to [facility]    Will put in nursing drawer at [place].    Signed Prescriptions:                        Disp   Refills    fentaNYL (DURAGESIC) 50 mcg/hr 72 hr patch 15 pat*0        Sig: Place 1 patch onto the skin every 48 hours MYLAN           BRAND ONLY. Fill on/after 3/14/18 to start           on/after 3/16/18  Authorizing Provider: BRANDYN JENKINS    oxyCODONE (ROXICODONE) 5 MG/5ML solution   1350 mL0        Sig: Take 10-15 mLs (10-15 mg) by mouth every 4 hours as           needed for moderate to severe pain Max of 45 mg           per day. Fill on/after 3/14/18 not to start           untill 3/16/18.  Authorizing Provider: BRANDYN JENKINS MD  [facility]

## 2018-02-23 NOTE — PROGRESS NOTES
Clinic Care Coordination Contact  [facility]/Voicemail    Referral Source: IP/TCU Report  Clinical Data: Care Coordinator Outreach  Outreach attempted x 1.  Left message on voicemail with call back information and requested return call.  Plan: Care Coordinator mailed out care coordination introduction letter on 4/4/16. Care Coordinator will try to reach patient again in 5-10 business days.    Melissa Behl BSN, RN, N  [facility] Care Coordinator  [phone]

## 2018-02-26 ENCOUNTER — HOSPITAL ENCOUNTER (OUTPATIENT)
Dept: LAB | Facility: CLINIC | Age: 48
Discharge: HOME | End: 2018-02-26
Attending: SURGERY | Admitting: SURGERY
Payer: COMMERCIAL

## 2018-02-26 ENCOUNTER — HOME INFUSION (PRE-WILLOW HOME INFUSION) (OUTPATIENT)
Dept: PHARMACY | Facility: CLINIC | Age: 48
End: 2018-02-26

## 2018-02-26 LAB
ALBUMIN SERPL-MCNC: 3.4 G/DL (ref 3.4–5)
ALP SERPL-CCNC: 69 U/L (ref 40–150)
ALT SERPL W P-5'-P-CCNC: 20 U/L (ref 0–70)
ANION GAP SERPL CALCULATED.3IONS-SCNC: 9 MMOL/L (ref 3–14)
AST SERPL W P-5'-P-CCNC: 11 U/L (ref 0–45)
BASOPHILS # BLD AUTO: 0 10E9/L (ref 0–0.2)
BASOPHILS NFR BLD AUTO: 0.2 %
BILIRUB DIRECT SERPL-MCNC: 0.1 MG/DL (ref 0–0.2)
BILIRUB SERPL-MCNC: 0.5 MG/DL (ref 0.2–1.3)
BUN SERPL-MCNC: 12 MG/DL (ref 7–30)
CALCIUM SERPL-MCNC: 8.1 MG/DL (ref 8.5–10.1)
CHLORIDE SERPL-SCNC: 108 MMOL/L (ref 94–109)
CO2 SERPL-SCNC: 27 MMOL/L (ref 20–32)
CREAT SERPL-MCNC: 0.59 MG/DL (ref 0.66–1.25)
DIFFERENTIAL METHOD BLD: ABNORMAL
EOSINOPHIL # BLD AUTO: 0.2 10E9/L (ref 0–0.7)
EOSINOPHIL NFR BLD AUTO: 4 %
ERYTHROCYTE [DISTWIDTH] IN BLOOD BY AUTOMATED COUNT: 13.6 % (ref 10–15)
GFR SERPL CREATININE-BSD FRML MDRD: >90 ML/MIN/1.7M2
GLUCOSE SERPL-MCNC: 128 MG/DL (ref 70–99)
HCT VFR BLD AUTO: 40.5 % (ref 40–53)
HGB BLD-MCNC: 12.9 G/DL (ref 13.3–17.7)
IMM GRANULOCYTES # BLD: 0 10E9/L (ref 0–0.4)
IMM GRANULOCYTES NFR BLD: 0.2 %
LYMPHOCYTES # BLD AUTO: 1.6 10E9/L (ref 0.8–5.3)
LYMPHOCYTES NFR BLD AUTO: 26.6 %
MAGNESIUM SERPL-MCNC: 1.9 MG/DL (ref 1.6–2.3)
MCH RBC QN AUTO: 31.7 PG (ref 26.5–33)
MCHC RBC AUTO-ENTMCNC: 31.9 G/DL (ref 31.5–36.5)
MCV RBC AUTO: 100 FL (ref 78–100)
MONOCYTES # BLD AUTO: 0.5 10E9/L (ref 0–1.3)
MONOCYTES NFR BLD AUTO: 8.2 %
NEUTROPHILS # BLD AUTO: 3.7 10E9/L (ref 1.6–8.3)
NEUTROPHILS NFR BLD AUTO: 60.8 %
PHOSPHATE SERPL-MCNC: 2.8 MG/DL (ref 2.5–4.5)
PLATELET # BLD AUTO: 155 10E9/L (ref 150–450)
POTASSIUM SERPL-SCNC: 3.6 MMOL/L (ref 3.4–5.3)
PROT SERPL-MCNC: 6.7 G/DL (ref 6.8–8.8)
RBC # BLD AUTO: 4.07 10E12/L (ref 4.4–5.9)
SODIUM SERPL-SCNC: 144 MMOL/L (ref 133–144)
TRIGL SERPL-MCNC: 79 MG/DL
WBC # BLD AUTO: 6 10E9/L (ref 4–11)

## 2018-02-26 PROCEDURE — 84478 ASSAY OF TRIGLYCERIDES: CPT | Performed by: SURGERY

## 2018-02-26 PROCEDURE — 80053 COMPREHEN METABOLIC PANEL: CPT | Performed by: SURGERY

## 2018-02-26 PROCEDURE — 85025 COMPLETE CBC W/AUTO DIFF WBC: CPT | Performed by: SURGERY

## 2018-02-26 PROCEDURE — 82248 BILIRUBIN DIRECT: CPT | Performed by: SURGERY

## 2018-02-26 PROCEDURE — 84134 ASSAY OF PREALBUMIN: CPT | Performed by: SURGERY

## 2018-02-26 PROCEDURE — 84100 ASSAY OF PHOSPHORUS: CPT | Performed by: SURGERY

## 2018-02-26 PROCEDURE — 83735 ASSAY OF MAGNESIUM: CPT | Performed by: SURGERY

## 2018-02-26 NOTE — PROGRESS NOTES
This is a recent snapshot of the patient's [facility] medical record.  For current drug dose and complete information and questions, call [phone] or In Basket pool, fv home infusion ([id number])  CSN Number:  [id number]

## 2018-02-26 NOTE — TELEPHONE ENCOUNTER
Scripts mailed to     [facility] [place], [place] - [address]  [address] [facility] [address]  Phone: [phone] Fax: [phone]

## 2018-02-27 ENCOUNTER — HOME INFUSION (PRE-WILLOW HOME INFUSION) (OUTPATIENT)
Dept: PHARMACY | Facility: CLINIC | Age: 48
End: 2018-02-27

## 2018-02-27 LAB — PREALB SERPL IA-MCNC: 25 MG/DL (ref 15–45)

## 2018-02-28 ENCOUNTER — HOME INFUSION (PRE-WILLOW HOME INFUSION) (OUTPATIENT)
Dept: PHARMACY | Facility: CLINIC | Age: 48
End: 2018-02-28

## 2018-02-28 NOTE — PROGRESS NOTES
This is a recent snapshot of the patient's [facility] medical record.  For current drug dose and complete information and questions, call [phone] or In [facility] pool, fv home infusion ([id number])  CSN Number:  [id number]

## 2018-02-28 NOTE — PROGRESS NOTES
This is a recent snapshot of the patient's [facility] medical record.  For current drug dose and complete information and questions, call [phone] or In Basket pool, fv home infusion ([id number])  CSN Number:  [id number]

## 2018-03-01 NOTE — PROGRESS NOTES
This is a recent snapshot of the patient's [facility] medical record.  For current drug dose and complete information and questions, call [phone] or In Basket pool, fv home infusion ([id number])  CSN Number:  [id number]

## 2018-03-02 ENCOUNTER — HOME INFUSION (PRE-WILLOW HOME INFUSION) (OUTPATIENT)
Dept: PHARMACY | Facility: CLINIC | Age: 48
End: 2018-03-02

## 2018-03-02 NOTE — PROGRESS NOTES
Clinic Care Coordination Contact  [facility]/Voicemail    Referral Source: IP/TCU Report  Clinical Data: Care Coordinator Outreach  Outreach attempted x 2.  Left message on voicemail with call back information and requested return call.  Plan: Care Coordinator mailed out care coordination introduction letter on 4/4/16. Care Coordinator will try to reach patient again in 10-20 business days.    Melissa Behl BSN, RN, N  [facility] Care Coordinator  [phone]

## 2018-03-06 ENCOUNTER — HOME INFUSION (PRE-WILLOW HOME INFUSION) (OUTPATIENT)
Dept: PHARMACY | Facility: CLINIC | Age: 48
End: 2018-03-06

## 2018-03-09 ENCOUNTER — HOME INFUSION (PRE-WILLOW HOME INFUSION) (OUTPATIENT)
Dept: PHARMACY | Facility: CLINIC | Age: 48
End: 2018-03-09

## 2018-03-12 ENCOUNTER — HOME INFUSION (PRE-WILLOW HOME INFUSION) (OUTPATIENT)
Dept: PHARMACY | Facility: CLINIC | Age: 48
End: 2018-03-12

## 2018-03-13 ENCOUNTER — TRANSFERRED RECORDS (OUTPATIENT)
Dept: HEALTH INFORMATION MANAGEMENT | Facility: CLINIC | Age: 48
End: 2018-03-13

## 2018-03-13 NOTE — PROGRESS NOTES
This is a recent snapshot of the patient's [facility] medical record.  For current drug dose and complete information and questions, call [phone] or In Basket pool, fv home infusion ([id number])  CSN Number:  [id number]

## 2018-03-16 ENCOUNTER — HOME INFUSION (PRE-WILLOW HOME INFUSION) (OUTPATIENT)
Dept: PHARMACY | Facility: CLINIC | Age: 48
End: 2018-03-16

## 2018-03-16 NOTE — PROGRESS NOTES
This is a recent snapshot of the patient's [facility] medical record.  For current drug dose and complete information and questions, call [phone] or In Basket pool, fv home infusion ([id number])  CSN Number:  [id number]

## 2018-03-19 ENCOUNTER — HOME INFUSION (PRE-WILLOW HOME INFUSION) (OUTPATIENT)
Dept: PHARMACY | Facility: CLINIC | Age: 48
End: 2018-03-19

## 2018-03-19 NOTE — PROGRESS NOTES
This is a recent snapshot of the patient's [facility] medical record.  For current drug dose and complete information and questions, call [phone] or In Basket pool, fv home infusion ([id number])  CSN Number:  [id number]

## 2018-03-20 ENCOUNTER — HOME INFUSION (PRE-WILLOW HOME INFUSION) (OUTPATIENT)
Dept: PHARMACY | Facility: CLINIC | Age: 48
End: 2018-03-20

## 2018-03-20 NOTE — PROGRESS NOTES
This is a recent snapshot of the patient's [facility] medical record.  For current drug dose and complete information and questions, call [phone] or In Basket pool, fv home infusion ([id number])  CSN Number:  [id number]

## 2018-03-22 NOTE — PROGRESS NOTES
This is a recent snapshot of the patient's [facility] medical record.  For current drug dose and complete information and questions, call [phone] or In Basket pool, fv home infusion ([id number])  CSN Number:  [id number]

## 2018-03-23 ENCOUNTER — HOME INFUSION (PRE-WILLOW HOME INFUSION) (OUTPATIENT)
Dept: PHARMACY | Facility: CLINIC | Age: 48
End: 2018-03-23

## 2018-03-26 ENCOUNTER — HOME INFUSION (PRE-WILLOW HOME INFUSION) (OUTPATIENT)
Dept: PHARMACY | Facility: CLINIC | Age: 48
End: 2018-03-26

## 2018-03-26 NOTE — PROGRESS NOTES
This is a recent snapshot of the patient's [facility] medical record.  For current drug dose and complete information and questions, call [phone] or In Basket pool, fv home infusion ([id number])  CSN Number:  [id number]

## 2018-03-30 ENCOUNTER — HOSPITAL ENCOUNTER (OUTPATIENT)
Dept: LAB | Facility: CLINIC | Age: 48
Discharge: HOME | End: 2018-03-30
Attending: SURGERY | Admitting: SURGERY
Payer: COMMERCIAL

## 2018-03-30 ENCOUNTER — HOME INFUSION (PRE-WILLOW HOME INFUSION) (OUTPATIENT)
Dept: PHARMACY | Facility: CLINIC | Age: 48
End: 2018-03-30

## 2018-03-30 LAB
ALBUMIN SERPL-MCNC: 3.4 G/DL (ref 3.4–5)
ALP SERPL-CCNC: 79 U/L (ref 40–150)
ALT SERPL W P-5'-P-CCNC: 25 U/L (ref 0–70)
ANION GAP SERPL CALCULATED.3IONS-SCNC: 10 MMOL/L (ref 3–14)
AST SERPL W P-5'-P-CCNC: 19 U/L (ref 0–45)
BASOPHILS # BLD AUTO: 0 10E9/L (ref 0–0.2)
BASOPHILS NFR BLD AUTO: 0.5 %
BILIRUB DIRECT SERPL-MCNC: <0.1 MG/DL (ref 0–0.2)
BILIRUB SERPL-MCNC: 0.3 MG/DL (ref 0.2–1.3)
BUN SERPL-MCNC: 12 MG/DL (ref 7–30)
CALCIUM SERPL-MCNC: 8 MG/DL (ref 8.5–10.1)
CHLORIDE SERPL-SCNC: 108 MMOL/L (ref 94–109)
CO2 SERPL-SCNC: 27 MMOL/L (ref 20–32)
CREAT SERPL-MCNC: 0.59 MG/DL (ref 0.66–1.25)
DIFFERENTIAL METHOD BLD: ABNORMAL
EOSINOPHIL # BLD AUTO: 0.2 10E9/L (ref 0–0.7)
EOSINOPHIL NFR BLD AUTO: 3.3 %
ERYTHROCYTE [DISTWIDTH] IN BLOOD BY AUTOMATED COUNT: 13.4 % (ref 10–15)
GFR SERPL CREATININE-BSD FRML MDRD: >90 ML/MIN/1.7M2
GLUCOSE SERPL-MCNC: 90 MG/DL (ref 70–99)
HCT VFR BLD AUTO: 40.9 % (ref 40–53)
HGB BLD-MCNC: 13.3 G/DL (ref 13.3–17.7)
IMM GRANULOCYTES # BLD: 0 10E9/L (ref 0–0.4)
IMM GRANULOCYTES NFR BLD: 0.2 %
LYMPHOCYTES # BLD AUTO: 1.8 10E9/L (ref 0.8–5.3)
LYMPHOCYTES NFR BLD AUTO: 26.9 %
MAGNESIUM SERPL-MCNC: 1.9 MG/DL (ref 1.6–2.3)
MCH RBC QN AUTO: 32.2 PG (ref 26.5–33)
MCHC RBC AUTO-ENTMCNC: 32.5 G/DL (ref 31.5–36.5)
MCV RBC AUTO: 99 FL (ref 78–100)
MONOCYTES # BLD AUTO: 0.8 10E9/L (ref 0–1.3)
MONOCYTES NFR BLD AUTO: 12 %
NEUTROPHILS # BLD AUTO: 3.8 10E9/L (ref 1.6–8.3)
NEUTROPHILS NFR BLD AUTO: 57.1 %
PHOSPHATE SERPL-MCNC: 3 MG/DL (ref 2.5–4.5)
PLATELET # BLD AUTO: 159 10E9/L (ref 150–450)
POTASSIUM SERPL-SCNC: 3.8 MMOL/L (ref 3.4–5.3)
PROT SERPL-MCNC: 6.7 G/DL (ref 6.8–8.8)
RBC # BLD AUTO: 4.13 10E12/L (ref 4.4–5.9)
SODIUM SERPL-SCNC: 145 MMOL/L (ref 133–144)
TRIGL SERPL-MCNC: 77 MG/DL
WBC # BLD AUTO: 6.6 10E9/L (ref 4–11)

## 2018-03-30 PROCEDURE — 80053 COMPREHEN METABOLIC PANEL: CPT | Performed by: SURGERY

## 2018-03-30 PROCEDURE — 84478 ASSAY OF TRIGLYCERIDES: CPT | Performed by: SURGERY

## 2018-03-30 PROCEDURE — 85025 COMPLETE CBC W/AUTO DIFF WBC: CPT | Performed by: SURGERY

## 2018-03-30 PROCEDURE — 83735 ASSAY OF MAGNESIUM: CPT | Performed by: SURGERY

## 2018-03-30 PROCEDURE — 84134 ASSAY OF PREALBUMIN: CPT | Performed by: SURGERY

## 2018-03-30 PROCEDURE — 84100 ASSAY OF PHOSPHORUS: CPT | Performed by: SURGERY

## 2018-03-30 PROCEDURE — 82248 BILIRUBIN DIRECT: CPT | Performed by: SURGERY

## 2018-03-30 NOTE — PROGRESS NOTES
This is a recent snapshot of the patient's [facility] medical record.  For current drug dose and complete information and questions, call [phone] or In Basket pool, fv home infusion ([id number])  CSN Number:  [id number]

## 2018-04-01 LAB — PREALB SERPL IA-MCNC: 24 MG/DL (ref 15–45)

## 2018-04-02 NOTE — PROGRESS NOTES
This is a recent snapshot of the patient's [facility] medical record.  For current drug dose and complete information and questions, call [phone] or In Basket pool, fv home infusion ([id number])  CSN Number:  [id number]

## 2018-04-03 ENCOUNTER — HOME INFUSION (PRE-WILLOW HOME INFUSION) (OUTPATIENT)
Dept: PHARMACY | Facility: CLINIC | Age: 48
End: 2018-04-03

## 2018-04-04 ENCOUNTER — TELEPHONE (OUTPATIENT)
Dept: FAMILY MEDICINE | Facility: CLINIC | Age: 48
End: 2018-04-04

## 2018-04-04 ENCOUNTER — OFFICE VISIT (OUTPATIENT)
Dept: PALLIATIVE MEDICINE | Facility: CLINIC | Age: 48
End: 2018-04-04
Payer: COMMERCIAL

## 2018-04-04 ENCOUNTER — MYC REFILL (OUTPATIENT)
Dept: FAMILY MEDICINE | Facility: CLINIC | Age: 48
End: 2018-04-04

## 2018-04-04 VITALS
WEIGHT: 176 LBS | HEART RATE: 73 BPM | BODY MASS INDEX: 24.64 KG/M2 | HEIGHT: 71 IN | DIASTOLIC BLOOD PRESSURE: 76 MMHG | SYSTOLIC BLOOD PRESSURE: 134 MMHG

## 2018-04-04 DIAGNOSIS — K90.829 SHORT GUT SYNDROME: ICD-10-CM

## 2018-04-04 DIAGNOSIS — R11.0 NAUSEA: ICD-10-CM

## 2018-04-04 DIAGNOSIS — R10.9 CHRONIC ABDOMINAL PAIN: Primary | ICD-10-CM

## 2018-04-04 DIAGNOSIS — G89.29 CHRONIC ABDOMINAL PAIN: Primary | ICD-10-CM

## 2018-04-04 DIAGNOSIS — Z79.891 ENCOUNTER FOR LONG-TERM OPIATE ANALGESIC USE: ICD-10-CM

## 2018-04-04 DIAGNOSIS — L08.9 SKIN INFECTION: ICD-10-CM

## 2018-04-04 DIAGNOSIS — Z98.84 BARIATRIC SURGERY STATUS: ICD-10-CM

## 2018-04-04 DIAGNOSIS — R23.8 SKIN IRRITATION: ICD-10-CM

## 2018-04-04 DIAGNOSIS — G89.4 CHRONIC PAIN SYNDROME: ICD-10-CM

## 2018-04-04 PROCEDURE — 99214 OFFICE O/P EST MOD 30 MIN: CPT | Performed by: PSYCHIATRY & NEUROLOGY

## 2018-04-04 RX ORDER — MUPIROCIN 20 MG/G
OINTMENT TOPICAL 3 TIMES DAILY
Qty: 30 G | Refills: 3 | Status: ON HOLD | OUTPATIENT
Start: 2018-04-04 | End: 2018-06-06

## 2018-04-04 RX ORDER — NYSTATIN AND TRIAMCINOLONE ACETONIDE 100000; 1 [USP'U]/G; MG/G
CREAM TOPICAL 2 TIMES DAILY PRN
Qty: 60 G | Refills: 5 | Status: ON HOLD | OUTPATIENT
Start: 2018-04-04 | End: 2018-06-06

## 2018-04-04 RX ORDER — OXYCODONE HCL 5 MG/5 ML
10-15 SOLUTION, ORAL ORAL EVERY 4 HOURS PRN
Qty: 1350 ML | Refills: 0 | Status: SHIPPED | OUTPATIENT
Start: 2018-04-04 | End: 2018-04-30

## 2018-04-04 RX ORDER — SUCRALFATE ORAL 1 G/10ML
1 SUSPENSION ORAL 4 TIMES DAILY
Qty: 1200 ML | Refills: 11 | Status: SHIPPED | OUTPATIENT
Start: 2018-04-04 | End: 2019-03-14

## 2018-04-04 RX ORDER — CYANOCOBALAMIN 1000 UG/ML
1 INJECTION, SOLUTION INTRAMUSCULAR; SUBCUTANEOUS
Qty: 1 ML | Refills: 11 | Status: SHIPPED | OUTPATIENT
Start: 2018-04-04 | End: 2018-09-26

## 2018-04-04 RX ORDER — FENTANYL 12.5 UG/1
1 PATCH TRANSDERMAL
Qty: 15 PATCH | Refills: 0 | Status: SHIPPED | OUTPATIENT
Start: 2018-04-04 | End: 2018-04-23

## 2018-04-04 RX ORDER — FENTANYL 25 UG/1
1 PATCH TRANSDERMAL
Qty: 15 PATCH | Refills: 0 | Status: SHIPPED | OUTPATIENT
Start: 2018-04-04 | End: 2018-04-23

## 2018-04-04 RX ORDER — ONDANSETRON 8 MG/1
8 TABLET, ORALLY DISINTEGRATING ORAL EVERY 8 HOURS PRN
Qty: 90 TABLET | Refills: 3 | Status: SHIPPED | OUTPATIENT
Start: 2018-04-04 | End: 2018-11-06

## 2018-04-04 ASSESSMENT — PAIN SCALES - GENERAL: PAINLEVEL: SEVERE PAIN (6)

## 2018-04-04 NOTE — TELEPHONE ENCOUNTER
Pt's wife calling. She is wondering if you can change the start date of the Adderall to today instead of 4/6? Please call.   Thank you,  Jyothi Perez- Pt Rep.

## 2018-04-04 NOTE — NURSING NOTE
"Chief Complaint   Patient presents with     Pain     Follow up        Initial /76  Pulse 73  Ht 1.803 m (5' 11\")  Wt 79.8 kg (176 lb)  BMI 24.55 kg/m2 Estimated body mass index is 24.55 kg/(m^2) as calculated from the following:    Height as of this encounter: 1.803 m (5' 11\").    Weight as of this encounter: 79.8 kg (176 lb).  Medication Reconciliation: complete     Lyndsay Solo MA      "

## 2018-04-04 NOTE — TELEPHONE ENCOUNTER
Spoke to pt's wife - auth to discuss on file - she stated that today would be 30 days from previous fill date.    Called pharmacy to verify fill dates.  30 days supplies were filled on 12/7/2017, 1/5/2018, 2/5/2018, 3/7/2018, would be due 4/6/2018.    Please advise.

## 2018-04-04 NOTE — PROGRESS NOTES
[facility]    Date of visit: 4/4/2018     Chief complaint:    Chief Complaint   Patient presents with     Pain     Follow up        Interval history:  Parker Acevedo was last seen by me on 1/3/18    Recommendations/plan at the last visit included:  1. He is completely off his benzodiazepines.  No changes to opioids at this time, but will further plan for decreases. He underwent a port replacement on 12/19/17 and just moved into a new house that requires work.  Discussed doses and risks with him and his wife, and CDC guidelines. Some of his oral meds may not be fully absorbed due to his medical issues.  3. He has naloxone prescription from a previous hospitalization.   4. UDS at next visit (last was in 4/2017)  5. Opioid agreement signed on 12/7/17   6. Will decrease oxycodone use to 45mg/day with next script. Duet 1/15 due to use after port placement  7.  Follow up 3 months    Since his last visit, Parker Acevedo reports:  -no significant changes in pain since last visit  -continued regular medical issues with port and infection  -willing to come down on the dose.    Pain scores:  Pain intensity  5-6/10    Current pain treatments:   Oxycodone 10-15 mg liquid by G tube QID hours, total of 55 mg daily (MEDD 82.5 mg)  Fentanyl 50mcg/hr patch q48 hours   Lidocaine patches  Naloxone- has from previous hospitalization.    Previous medication treatments included:   Valium 5 mg/day, 1 tab in AM and PM-stopped in 11/2017  Tylenol #3   Vicodin   Tramadol   Diazepam- for sleep/anxiety  Gabapentin- bad headaches, acid reflux  Oxycodone max of 45mg/day.      Side Effects: no side effects    Medications:  Current Outpatient Prescriptions   Medication Sig Dispense Refill     fentaNYL (DURAGESIC) 50 mcg/hr 72 hr patch Place 1 patch onto the skin every 48 hours MYLAN BRAND ONLY. Fill on/after 3/14/18 to start on/after 3/16/18 15 patch 0     oxyCODONE (ROXICODONE) 5 MG/5ML solution  "Take 10-15 mLs (10-15 mg) by mouth every 4 hours as needed for moderate to severe pain Max of 45 mg per day. Fill on/after 3/14/18 not to start untill 3/16/18. 1350 mL 0     [START ON 4/6/2018] amphetamine-dextroamphetamine (ADDERALL) 20 MG per tablet Take 1 tablet (20 mg) by mouth 2 times daily 60 tablet 0     amphetamine-dextroamphetamine (ADDERALL) 20 MG per tablet Take 1 tablet (20 mg) by mouth 2 times daily 60 tablet 0     amphetamine-dextroamphetamine (ADDERALL) 20 MG per tablet Take 1 tablet (20 mg) by mouth 2 times daily 60 tablet 0     diphenhydrAMINE (BENADRYL) 12.5 MG/5ML solution Take 10 mLs (25 mg) by mouth 2 times daily 480 mL 0     lidocaine (LIDODERM) 5 % Patch Place 1-2 patches onto the skin every 24 hours Wear for 12 hours, remove for 12 hours.  OK to cut to better fit to size. 60 patch 6     Multiple Vitamin (MULTI-VITAMINS) TABS Take 1 tablet by mouth       nystatin-triamcinolone (MYCOLOG II) cream Apply topically 2 times daily as needed 60 g 5     mupirocin (BACTROBAN) 2 % ointment Apply topically 3 times daily 30 g 3     Needle, Disp, (BD DISP NEEDLES) 27G X 1/2\" MISC 1 Device every 30 days Use for cyanocobalamin injection once q 30 days. 3 each 4     sucralfate (CARAFATE) 1 GM/10ML suspension Take 10 mLs (1 g) by mouth 4 times daily 1200 mL 11     ondansetron (ZOFRAN-ODT) 8 MG ODT tab Take 1 tablet (8 mg) by mouth every 8 hours as needed for nausea 90 tablet 3     cyanocobalamin (VITAMIN B12) 1000 MCG/ML injection Inject 1 mL (1,000 mcg) into the muscle every 30 days 1 mL 11     lidocaine-prilocaine (EMLA) cream Apply topically as needed for moderate pain 30 g 11     albuterol (PROAIR HFA/PROVENTIL HFA/VENTOLIN HFA) 108 (90 BASE) MCG/ACT Inhaler Inhale 2 puffs into the lungs every 4 hours as needed for shortness of breath / dyspnea or wheezing 1 Inhaler 3     vitamin D (ERGOCALCIFEROL) 76661 UNIT capsule Take 1 capsule (50,000 Units) by mouth every 7 days 12 capsule 3     Carvedilol (COREG PO) " "Take 12.5 mg by mouth 2 times daily       naloxone (NARCAN) nasal spray Spray 1 spray (4 mg) into one nostril alternating nostrils as needed for opioid reversal (every 2-3 minutes until assistance arrives.) 0.2 mL 0     [facility] PATIENT Contact [facility] for patient specific medication information at [phone] on admission and discharge from the hospital.  Phones are answered 24 hours a day 7 days a week 365 days a year.    Providers - Choose \"CONTINUE HOME MED (no script)\" at discharge if patient treatment with home infusion will continue.       order for DME Equipment being ordered: Bilateral knee high chronic venous insufficiency stockings--  mild-moderate pressures. 4 each 5     [facility] Contact [facility] for patient specific medication information at [phone] on admission and discharge from the hospital.  Phones are answered 24 hours a day 7 days a week 365 days a year.    Providers - Choose \"CONTINUE HOME MED (no script)\" at discharge if patient treatment with home infusion will continue.       order for DME Injection Supplies for Vitamin B12: 3cc syringes w/ 27 gauge needles, 1/2 inch length 12 each 0     levofloxacin (LEVAQUIN) 500 MG tablet Take 1 tablet (500 mg) by mouth daily (Patient not taking: Reported on 4/4/2018) 10 tablet 0     morphine 0.1% in intrasite topical gel Apply as needed prior to accessing the port site. (Patient not taking: Reported on 4/4/2018) 100 g 0     cyanocobalamin (VITAMIN B12) 1000 MCG/ML injection Inject 1 mL into the muscle every 30 days       [DISCONTINUED] NO ACTIVE MEDICATIONS . 0 0       Medical History: any changes in medical history since they were last seen? Port replacement on 12/19/17    Review of Systems:  The 14 system ROS was reviewed from the intake questionnaire, and is positive for: headache, itching, abdominal pain, nausea, joint pain, stiffness, arthritis, back pain, neck " "pian, anxiety and depression  Any bowel or bladder problems: diarrhea, bladder normal  Mood: Baseline, with anxiety    Physical Exam:  Blood pressure 134/76, pulse 73, height 1.803 m (5' 11\"), weight 79.8 kg (176 lb).  General: awake, alert  Gait: Slow  MSK exam: hunched posture  Port site without drainage - healing well       THE 4 A's OF OPIOID MAINTENANCE ANALGESIA    Analgesia: good    Activity: on disability    Adverse effects: none    Adherence to Rx protocol: good- [place] Prescription Monitoring Program checked 1/3/2018 appropriate      Assessment:   1. Chronic abdominal pain, with history of gastric bypass and later peptic ulcer   2. G tube placement- only used for venting  3. Myofascial abdominal pain, with significant guarding and poor posture  4. Opioid tolerance  5. Anxiety  6. Foot pain with tendonous injury- healed  7. Left arm weakness, consistent with radial nerve injury- improving  8. Port placement- h/o recurrent infections      Plan:   1. Discussed again risks with opioid dosing, he will check to make sure that narcan is not   2. Decrease fentanyl to 37mcg/hr  3. Follow up in 3 months.    Total time spent was 25 minutes, and more than 50% of face to face time was spent in counseling and/or coordination of care regarding medications, opioid risks.   Jessica Milligan MD  [facility]            "

## 2018-04-04 NOTE — TELEPHONE ENCOUNTER
Reviewed--verified daes with [facility]    30 days from 3/7/2018 is 4/6/2018.     Cannot move up the fill date--will need to be on 4/6/2018.    Esteban Daly MD  Please close encounter when call/work completed.

## 2018-04-04 NOTE — PATIENT INSTRUCTIONS
1. Check your narcan make sure appropriate  2. We are going to decrease the fentanyl to 37mcg/hr total.   3. Follow up in 3 months.    ----------------------------------------------------------------  Nurse Triage line:  [phone]   Call this number with any questions or concerns. You may leave a detailed message anytime. Calls are typically returned Monday through Friday between 8 AM and 4:30 PM. We usually get back to you within 2 business days depending on the issue/request.       Medication refills:    For non-narcotic medications, call your pharmacy directly to request a refill. The pharmacy will contact the [facility] for authorization. Please allow 3-4 days for these refills to be processed.     For narcotic refills, call the nurse triage line or send a [employer] message. Please contact us 7-10 days before your refill is due. The message MUST include the name of the specific medication(s) requested and how you would like to receive the prescription(s). The options are as follows:    Pain Clinic staff can mail the prescription to your pharmacy. Please tell us the name of the pharmacy.    You may pick the prescription up at the Pain Clinic (tell us the location) or during a clinic visit with your pain provider    Pain Clinic staff can deliver the prescription to the [facility] pharmacy in the clinic building. Please tell us the location.      Scheduling number: [phone].  Call this number to schedule or change appointments.    We believe regular attendance is key to your success in our program.    Any time you are unable to keep your appointment we ask that you call us at least 24 hours in advance to let us know. This will allow us to offer the appointment time to another patient.

## 2018-04-04 NOTE — MR AVS SNAPSHOT
After Visit Summary   4/4/2018    Parker Acevedo Sr    MRN: [id number]           Patient Information     Date Of Birth          1970        Visit Information        Provider Department      4/4/2018 1:30 PM Patti Milligan MD [facility]        Today's Diagnoses     Chronic abdominal pain        Encounter for long-term opiate analgesic use          Care Instructions    1. Check your narcan make sure appropriate  2. We are going to decrease the fentanyl to 37mcg/hr total.   3. Follow up in 3 months.    ----------------------------------------------------------------  Nurse Triage line:  [phone]   Call this number with any questions or concerns. You may leave a detailed message anytime. Calls are typically returned Monday through Friday between 8 AM and 4:30 PM. We usually get back to you within 2 business days depending on the issue/request.       Medication refills:    For non-narcotic medications, call your pharmacy directly to request a refill. The pharmacy will contact the [facility] for authorization. Please allow 3-4 days for these refills to be processed.     For narcotic refills, call the nurse triage line or send a [employer] message. Please contact us 7-10 days before your refill is due. The message MUST include the name of the specific medication(s) requested and how you would like to receive the prescription(s). The options are as follows:    Pain Clinic staff can mail the prescription to your pharmacy. Please tell us the name of the pharmacy.    You may pick the prescription up at the Pain Clinic (tell us the location) or during a clinic visit with your pain provider    Pain Clinic staff can deliver the prescription to the [facility] pharmacy in the clinic building. Please tell us the location.      Scheduling number: [phone].  Call this number to schedule or change appointments.    We believe regular attendance is key to your success in our  program.    Any time you are unable to keep your appointment we ask that you call us at least 24 hours in advance to let us know. This will allow us to offer the appointment time to another patient.               Follow-ups after your visit        Your next 10 appointments already scheduled     May 01, 2018 11:40 AM CDT   Office Visit with Esteban Daly MD   [facility] [place] ([facility] [place])    [address], [address]  [place] [id number] [phone]           Bring a current list of meds and any records pertaining to this visit. For Physicals, please bring immunization records and any forms needing to be filled out. Please arrive 10 minutes early to complete paperwork.            Jun 21, 2018 11:20 AM CDT   (Arrive by 11:05 AM)   RETURN BARIATRIC SURGERY with Frnacis Vyas MD   [facility] ([facility])    [address]  [address] [facility] [address] [phone]              Who to contact     If you have questions or need follow up information about today's clinic visit or your schedule please contact [facility] directly at [phone].  Normal or non-critical lab and imaging results will be communicated to you by MyChart, letter or phone within 4 business days after the clinic has received the results. If you do not hear from us within 7 days, please contact the clinic through MyChart or phone. If you have a critical or abnormal lab result, we will notify you by phone as soon as possible.  Submit refill requests through Room 77 or call your pharmacy and they will forward the refill request to us. Please allow 3 business days for your refill to be completed.          Additional Information About Your Visit        [facility] Information     Room 77 gives you secure access to your electronic health record. If you see a primary care provider, you can also send messages to your care team and make  "appointments. If you have questions, please call your primary care clinic.  If you do not have a primary care provider, please call [phone] and they will assist you.        Care EveryWhere ID     This is your Care EveryWhere ID. This could be used by other organizations to access your [facility] medical records  [id number]        Your Vitals Were     Pulse Height BMI (Body Mass Index)             73 1.803 m (5' 11\") 24.55 kg/m2          Blood Pressure from Last 3 Encounters:   04/04/18 134/76   02/06/18 132/84   01/30/18 122/76    Weight from Last 3 Encounters:   04/04/18 79.8 kg (176 lb)   01/30/18 92.5 kg (203 lb 14.4 oz)   01/12/18 84.4 kg (186 lb)              Today, you had the following     No orders found for display         Today's Medication Changes          These changes are accurate as of 4/4/18  1:38 PM.  If you have any questions, ask your nurse or doctor.               Start taking these medicines.        Dose/Directions    * fentaNYL 25 mcg/hr 72 hr patch   Commonly known as:  DURAGESIC   Used for:  Chronic abdominal pain, Encounter for long-term opiate analgesic use   Replaces:  fentaNYL 50 mcg/hr 72 hr patch   Started by:  Patti Milligan MD        Dose:  1 patch   Place 1 patch onto the skin every 48 hours remove old patch.  Release on/after 4/13 to start on/after 4/15   Quantity:  15 patch   Refills:  0       * fentaNYL 12 mcg/hr 72 hr patch   Commonly known as:  DURAGESIC   Used for:  Chronic abdominal pain, Encounter for long-term opiate analgesic use   Started by:  Patti Milligan MD        Dose:  1 patch   Place 1 patch onto the skin every 48 hours remove old patch. Release on/after 4/13 to start on/after 4/15   Quantity:  15 patch   Refills:  0       * Notice:  This list has 2 medication(s) that are the same as other medications prescribed for you. Read the directions carefully, and ask your doctor or other care provider to review them with you.      These medicines have " changed or have updated prescriptions.        Dose/Directions    oxyCODONE 5 MG/5ML solution   Commonly known as:  ROXICODONE   This may have changed:  additional instructions   Used for:  Encounter for long-term opiate analgesic use, Chronic abdominal pain   Changed by:  Patti Milligan MD        Dose:  10-15 mg   Take 10-15 mLs (10-15 mg) by mouth every 4 hours as needed for moderate to severe pain Max of 45 mg per day. Fill on/after 4/13/18 not to start untill 4/15/18.   Quantity:  1350 mL   Refills:  0         Stop taking these medicines if you haven't already. Please contact your care team if you have questions.     fentaNYL 50 mcg/hr 72 hr patch   Commonly known as:  DURAGESIC   Replaced by:  fentaNYL 25 mcg/hr 72 hr patch   Stopped by:  Patti Milligan MD                Where to get your medicines      Some of these will need a paper prescription and others can be bought over the counter.  Ask your nurse if you have questions.     Bring a paper prescription for each of these medications     fentaNYL 12 mcg/hr 72 hr patch    fentaNYL 25 mcg/hr 72 hr patch    oxyCODONE 5 MG/5ML solution                Primary Care Provider Office Phone # Fax #    Esteban Paul Daly [phone] [address] [facility] [facility] [address]        Equal Access to Services     [facility] AH: Hadii kameron ku hadasho Soomaali, waaxda luqadaha, qaybta kaalmada adeegyada, carmela dumont . So [facility] [phone].    ATENCIÓN: Si habla español, tiene a hicks disposición servicios gratuitos de asistencia lingüística. Llame al [phone].    We comply with applicable federal civil rights laws and [place] laws. We do not discriminate on the basis of race, color, national origin, age, disability, sex, sexual orientation, or gender identity.            Thank you!     Thank you for choosing [facility]  for your care. Our goal is always to provide you with excellent care.  Hearing back from our patients is one way we can continue to improve our services. Please take a few minutes to complete the written survey that you may receive in the mail after your visit with us. Thank you!             Your Updated Medication List - Protect others around you: Learn how to safely use, store and throw away your medicines at www.disposemymeds.org.          This list is accurate as of 4/4/18  1:38 PM.  Always use your most recent med list.                   Brand Name Dispense Instructions for use Diagnosis    albuterol 108 (90 BASE) MCG/ACT Inhaler    PROAIR HFA/PROVENTIL HFA/VENTOLIN HFA    1 Inhaler    Inhale 2 puffs into the lungs every 4 hours as needed for shortness of breath / dyspnea or wheezing    Aspiration pneumonitis (H)       * amphetamine-dextroamphetamine 20 MG per tablet    ADDERALL    60 tablet    Take 1 tablet (20 mg) by mouth 2 times daily    ADHD (attention deficit hyperactivity disorder), inattentive type       * amphetamine-dextroamphetamine 20 MG per tablet    ADDERALL    60 tablet    Take 1 tablet (20 mg) by mouth 2 times daily    ADHD (attention deficit hyperactivity disorder), inattentive type       * amphetamine-dextroamphetamine 20 MG per tablet   Start taking on:  4/6/2018    ADDERALL    60 tablet    Take 1 tablet (20 mg) by mouth 2 times daily    ADHD (attention deficit hyperactivity disorder), inattentive type       COREG PO      Take 12.5 mg by mouth 2 times daily        * cyanocobalamin 1000 MCG/ML injection    VITAMIN B12     Inject 1 mL into the muscle every 30 days        * cyanocobalamin 1000 MCG/ML injection    VITAMIN B12    1 mL    Inject 1 mL (1,000 mcg) into the muscle every 30 days    Bariatric surgery status       diphenhydrAMINE 12.5 MG/5ML solution    BENADRYL    480 mL    Take 10 mLs (25 mg) by mouth 2 times daily    Itching, Rash       * [facility] PATIENT      Contact [facility] for patient specific medication  "information at [phone] on admission and discharge from the hospital.  Phones are answered 24 hours a day 7 days a week 365 days a year.  Providers - Choose \"CONTINUE HOME MED (no script)\" at discharge if patient treatment with home infusion will continue.    S/P bariatric surgery       * [place] [facility]NAGED PATIENT      Contact [facility] for patient specific medication information at [phone] on admission and discharge from the hospital.  Phones are answered 24 hours a day 7 days a week 365 days a year.  Providers - Choose \"CONTINUE HOME MED (no script)\" at discharge if patient treatment with home infusion will continue.    Severe malnutrition (H)       * fentaNYL 25 mcg/hr 72 hr patch    DURAGESIC    15 patch    Place 1 patch onto the skin every 48 hours remove old patch.  Release on/after 4/13 to start on/after 4/15    Chronic abdominal pain, Encounter for long-term opiate analgesic use       * fentaNYL 12 mcg/hr 72 hr patch    DURAGESIC    15 patch    Place 1 patch onto the skin every 48 hours remove old patch. Release on/after 4/13 to start on/after 4/15    Chronic abdominal pain, Encounter for long-term opiate analgesic use       levofloxacin 500 MG tablet    LEVAQUIN    10 tablet    Take 1 tablet (500 mg) by mouth daily    Cellulitis, unspecified cellulitis site       lidocaine 5 % Patch    LIDODERM    60 patch    Place 1-2 patches onto the skin every 24 hours Wear for 12 hours, remove for 12 hours.  OK to cut to better fit to size.    Chronic bilateral low back pain without sciatica, Chronic abdominal pain, Myofascial pain       lidocaine-prilocaine cream    EMLA    30 g    Apply topically as needed for moderate pain    Pain following surgery or procedure       morphine 0.1% in intrasite topical gel     100 g    Apply as needed prior to accessing the port site.    Pain following surgery or procedure       MULTI-VITAMINS Tabs      Take 1 tablet by mouth        mupirocin 2 % " "ointment    BACTROBAN    30 g    Apply topically 3 times daily    Skin infection       naloxone nasal spray    NARCAN    0.2 mL    Spray 1 spray (4 mg) into one nostril alternating nostrils as needed for opioid reversal (every 2-3 minutes until assistance arrives.)    Encounter for long-term opiate analgesic use, Chronic abdominal pain       Needle (Disp) 27G X 1/2\" Misc    BD DISP NEEDLES    3 each    1 Device every 30 days Use for cyanocobalamin injection once q 30 days.    Bariatric surgery status       nystatin-triamcinolone cream    MYCOLOG II    60 g    Apply topically 2 times daily as needed    Skin irritation       ondansetron 8 MG ODT tab    ZOFRAN-ODT    90 tablet    Take 1 tablet (8 mg) by mouth every 8 hours as needed for nausea    Nausea       * order for DME     12 each    Injection Supplies for Vitamin B12: 3cc syringes w/ 27 gauge needles, 1/2 inch length    Bariatric surgery status       * order for DME     4 each    Equipment being ordered: Bilateral knee high chronic venous insufficiency stockings--  mild-moderate pressures.    Bilateral edema of lower extremity       oxyCODONE 5 MG/5ML solution    ROXICODONE    1350 mL    Take 10-15 mLs (10-15 mg) by mouth every 4 hours as needed for moderate to severe pain Max of 45 mg per day. Fill on/after 4/13/18 not to start untill 4/15/18.    Encounter for long-term opiate analgesic use, Chronic abdominal pain       sucralfate 1 GM/10ML suspension    CARAFATE    1200 mL    Take 10 mLs (1 g) by mouth 4 times daily    Nausea       vitamin D 74324 UNIT capsule    ERGOCALCIFEROL    12 capsule    Take 1 capsule (50,000 Units) by mouth every 7 days    Vitamin D deficiency       * Notice:  This list has 11 medication(s) that are the same as other medications prescribed for you. Read the directions carefully, and ask your doctor or other care provider to review them with you.      "

## 2018-04-04 NOTE — TELEPHONE ENCOUNTER
"Message from MyChart:  Original authorizing provider: MD Parker Camara  would like a refill of the following medications:  nystatin-triamcinolone (MYCOLOG II) cream [Esteban Daly MD]  mupirocin (BACTROBAN) 2 % ointment [Esteban Daly MD]  Needle, Disp, (BD DISP NEEDLES) 27G X 1/2\" MISC [Esteban Daly MD]  sucralfate (CARAFATE) 1 GM/10ML suspension [Esteban Daly MD]  ondansetron (ZOFRAN-ODT) 8 MG ODT tab [Esteban Daly MD]  cyanocobalamin (VITAMIN B12) 1000 MCG/ML injection [Esteban Daly MD]    Preferred pharmacy: [facility] [place], [place] - [address] [place] [address]    Comment:    "

## 2018-04-05 NOTE — PROGRESS NOTES
This is a recent snapshot of the patient's [facility] medical record.  For current drug dose and complete information and questions, call [phone] or In Basket pool, fv home infusion ([id number])  CSN Number:  [id number]

## 2018-04-06 ENCOUNTER — TELEPHONE (OUTPATIENT)
Dept: PEDIATRICS | Facility: CLINIC | Age: 48
End: 2018-04-06

## 2018-04-06 ENCOUNTER — HOME INFUSION (PRE-WILLOW HOME INFUSION) (OUTPATIENT)
Dept: PHARMACY | Facility: CLINIC | Age: 48
End: 2018-04-06

## 2018-04-09 ENCOUNTER — HOME INFUSION (PRE-WILLOW HOME INFUSION) (OUTPATIENT)
Dept: PHARMACY | Facility: CLINIC | Age: 48
End: 2018-04-09

## 2018-04-10 ENCOUNTER — CARE COORDINATION (OUTPATIENT)
Dept: SURGERY | Facility: CLINIC | Age: 48
End: 2018-04-10

## 2018-04-10 ENCOUNTER — HOME INFUSION (PRE-WILLOW HOME INFUSION) (OUTPATIENT)
Dept: PHARMACY | Facility: CLINIC | Age: 48
End: 2018-04-10

## 2018-04-10 NOTE — PROGRESS NOTES
Home care nurse called with and up date on pt. She feel like the site at azevedo line has healed well and no longer needs dressing changes. If any question to call meg at [phone].

## 2018-04-10 NOTE — PROGRESS NOTES
This is a recent snapshot of the patient's [facility] medical record.  For current drug dose and complete information and questions, call [phone] or In [facility] pool, fv home infusion ([id number])  CSN Number:  [id number]

## 2018-04-10 NOTE — PROGRESS NOTES
This is a recent snapshot of the patient's [facility] medical record.  For current drug dose and complete information and questions, call [phone] or In [facility] pool, fv home infusion ([id number])  CSN Number:  [id number]

## 2018-04-11 NOTE — PROGRESS NOTES
This is a recent snapshot of the patient's [facility] medical record.  For current drug dose and complete information and questions, call [phone] or In Basket pool, fv home infusion ([id number])  CSN Number:  [id number]

## 2018-04-12 ENCOUNTER — HOME INFUSION (PRE-WILLOW HOME INFUSION) (OUTPATIENT)
Dept: PHARMACY | Facility: CLINIC | Age: 48
End: 2018-04-12

## 2018-04-13 ENCOUNTER — TELEPHONE (OUTPATIENT)
Dept: PALLIATIVE MEDICINE | Facility: CLINIC | Age: 48
End: 2018-04-13

## 2018-04-13 NOTE — TELEPHONE ENCOUNTER
PA Initiation    Medication: FENTANYL 25 PATCH - INITIATED  Insurance Company:    Pharmacy [place] [facility] [place] [address]  Filling Pharmacy Phone: [phone]  Filling Pharmacy Fax:    Start Date: 4/13/2018

## 2018-04-13 NOTE — TELEPHONE ENCOUNTER
Please clarify.  Are you asking for us to do a PA?  Did you reach out to the MA team about this?    Or are you saying a new script for 5 is needed-- could he pay out of pocket for this.    Was medication given to patient?    Jessica Milligan MD  [facility]

## 2018-04-13 NOTE — TELEPHONE ENCOUNTER
Ins only covers a max of 10 patches every 25 days and pt should be getting 15 every 30 days .Ins is [employer] at [phone] and id# is [id number]  And the rest of the rx on both of the 5 patches remainng is void per med.  Sharon Fair, Pharmacy Tech, [facility] [place] [phone]

## 2018-04-13 NOTE — TELEPHONE ENCOUNTER
PA Initiation    Medication: FENTANYL 12 PATCH - INITIATED  Insurance Company: "[employer] - Phone [phone] Fax [phone]  Pharmacy Filling the Rx: [facility] [place] - [address] [place] [address]  Filling Pharmacy Phone: [phone]  Filling Pharmacy Fax:    Start Date: 4/13/2018

## 2018-04-13 NOTE — PROGRESS NOTES
This is a recent snapshot of the patient's [facility] medical record.  For current drug dose and complete information and questions, call [phone] or In Basket pool, fv home infusion ([id number])  CSN Number:  [id number]

## 2018-04-13 NOTE — TELEPHONE ENCOUNTER
Prior Authorization Retail Medication Request    Medication/Dose: qty on Fentanyl 12 mcg PATCH  ICD code (if different than what is on RX):  Encounter for long-term opiate analgesic use [Z79.891]   Previously Tried and Failed:    Rationale:      Insurance Name:  [facility]  Insurance ID:      [facility], [place] - [address] [place] [id number]  Phone: [phone] Fax: [phone]

## 2018-04-13 NOTE — TELEPHONE ENCOUNTER
Prior Authorization Retail Medication Request     Medication/Dose: qty on Fentanyl patches 25 mcg  ICD code (if different than what is on RX):  Encounter for long-term opiate analgesic use [Z79.891]   Previously Tried and Failed:    Rationale:       Insurance Name:  [facility]  Insurance ID:       [place], [place] - [address] [place] [address]  Phone: [phone] Fax: [phone]

## 2018-04-16 NOTE — TELEPHONE ENCOUNTER
MEDICATION APPEAL APPROVED    Medication: FENTANYL 25 PATCH - APPROVED  Authorization Effective Date: 3/14/2018  Authorization Expiration Date: 3/14/2019  Approved Dose/Quantity: 15 FOR 30 DAYS   Reference #: [id number]   Insurance Company:    Expected CoPay: [employer]     CoPay Card Available:      Foundation Assistance Needed:    Which Pharmacy is filling the prescription (Not needed for infusion/clinic administered): [facility] [place] - [address] [place] [address]

## 2018-04-16 NOTE — TELEPHONE ENCOUNTER
MEDICATION APPEAL APPROVED    Medication: FENTANYL 12 PATCH - APPROVED  Authorization Effective Date: 4/14/2018  Authorization Expiration Date: 4/14/2019  Approved Dose/Quantity: 15 FOR 30 DAYS  Reference #: [id number]   Insurance Company:    Expected CoPay: [employer]     CoPay Card Available:      Foundation Assistance Needed:    Which Pharmacy is filling the prescription (Not needed for infusion/clinic administered): [facility] [place] - [address] [place] [address]

## 2018-04-17 ENCOUNTER — HOME INFUSION (PRE-WILLOW HOME INFUSION) (OUTPATIENT)
Dept: PHARMACY | Facility: CLINIC | Age: 48
End: 2018-04-17

## 2018-04-19 ENCOUNTER — MYC MEDICAL ADVICE (OUTPATIENT)
Dept: PALLIATIVE MEDICINE | Facility: CLINIC | Age: 48
End: 2018-04-19

## 2018-04-19 DIAGNOSIS — Z79.891 ENCOUNTER FOR LONG-TERM OPIATE ANALGESIC USE: ICD-10-CM

## 2018-04-19 DIAGNOSIS — R10.9 CHRONIC ABDOMINAL PAIN: ICD-10-CM

## 2018-04-19 DIAGNOSIS — G89.29 CHRONIC ABDOMINAL PAIN: ICD-10-CM

## 2018-04-19 NOTE — PROGRESS NOTES
This is a recent snapshot of the patient's [facility] medical record.  For current drug dose and complete information and questions, call [phone] or In Basket pool, fv home infusion ([id number])  CSN Number:  [id number]

## 2018-04-20 ENCOUNTER — HOME INFUSION (PRE-WILLOW HOME INFUSION) (OUTPATIENT)
Dept: PHARMACY | Facility: CLINIC | Age: 48
End: 2018-04-20

## 2018-04-20 NOTE — TELEPHONE ENCOUNTER
Patient will need both doses of Fentanyl patch. He has 6 patches of both doses.    YADIRA LazarN, RN  Care Coordinator  [facility]

## 2018-04-20 NOTE — TELEPHONE ENCOUNTER
VM left today at: 2:08 pm    Rose calling to speak to someone about pt's fentaNYL (DURAGESIC) patches.    [phone] (home)       Amalia FONTANA    [facility]

## 2018-04-23 ENCOUNTER — HOSPITAL ENCOUNTER (OUTPATIENT)
Dept: LAB | Facility: CLINIC | Age: 48
Discharge: HOME | End: 2018-04-23
Attending: SURGERY | Admitting: SURGERY
Payer: COMMERCIAL

## 2018-04-23 ENCOUNTER — HOME INFUSION (PRE-WILLOW HOME INFUSION) (OUTPATIENT)
Dept: PHARMACY | Facility: CLINIC | Age: 48
End: 2018-04-23

## 2018-04-23 LAB
ALBUMIN SERPL-MCNC: 3.7 G/DL (ref 3.4–5)
ALP SERPL-CCNC: 81 U/L (ref 40–150)
ALT SERPL W P-5'-P-CCNC: 27 U/L (ref 0–70)
ANION GAP SERPL CALCULATED.3IONS-SCNC: 9 MMOL/L (ref 3–14)
AST SERPL W P-5'-P-CCNC: 15 U/L (ref 0–45)
BASOPHILS # BLD AUTO: 0 10E9/L (ref 0–0.2)
BASOPHILS NFR BLD AUTO: 0.5 %
BILIRUB DIRECT SERPL-MCNC: 0.1 MG/DL (ref 0–0.2)
BILIRUB SERPL-MCNC: 0.4 MG/DL (ref 0.2–1.3)
BUN SERPL-MCNC: 12 MG/DL (ref 7–30)
CALCIUM SERPL-MCNC: 8.3 MG/DL (ref 8.5–10.1)
CHLORIDE SERPL-SCNC: 106 MMOL/L (ref 94–109)
CO2 SERPL-SCNC: 29 MMOL/L (ref 20–32)
CREAT SERPL-MCNC: 0.7 MG/DL (ref 0.66–1.25)
DIFFERENTIAL METHOD BLD: ABNORMAL
EOSINOPHIL # BLD AUTO: 0.2 10E9/L (ref 0–0.7)
EOSINOPHIL NFR BLD AUTO: 2.9 %
ERYTHROCYTE [DISTWIDTH] IN BLOOD BY AUTOMATED COUNT: 13 % (ref 10–15)
GFR SERPL CREATININE-BSD FRML MDRD: >90 ML/MIN/1.7M2
GLUCOSE SERPL-MCNC: 85 MG/DL (ref 70–99)
HCT VFR BLD AUTO: 41.6 % (ref 40–53)
HGB BLD-MCNC: 13.5 G/DL (ref 13.3–17.7)
IMM GRANULOCYTES # BLD: 0 10E9/L (ref 0–0.4)
IMM GRANULOCYTES NFR BLD: 0.2 %
LYMPHOCYTES # BLD AUTO: 2 10E9/L (ref 0.8–5.3)
LYMPHOCYTES NFR BLD AUTO: 30.9 %
MAGNESIUM SERPL-MCNC: 2.1 MG/DL (ref 1.6–2.3)
MCH RBC QN AUTO: 31.8 PG (ref 26.5–33)
MCHC RBC AUTO-ENTMCNC: 32.5 G/DL (ref 31.5–36.5)
MCV RBC AUTO: 98 FL (ref 78–100)
MONOCYTES # BLD AUTO: 0.7 10E9/L (ref 0–1.3)
MONOCYTES NFR BLD AUTO: 10.3 %
NEUTROPHILS # BLD AUTO: 3.5 10E9/L (ref 1.6–8.3)
NEUTROPHILS NFR BLD AUTO: 55.2 %
PHOSPHATE SERPL-MCNC: 3.2 MG/DL (ref 2.5–4.5)
PLATELET # BLD AUTO: 141 10E9/L (ref 150–450)
POTASSIUM SERPL-SCNC: 3.6 MMOL/L (ref 3.4–5.3)
PROT SERPL-MCNC: 7.1 G/DL (ref 6.8–8.8)
RBC # BLD AUTO: 4.25 10E12/L (ref 4.4–5.9)
SODIUM SERPL-SCNC: 144 MMOL/L (ref 133–144)
TRIGL SERPL-MCNC: 72 MG/DL
WBC # BLD AUTO: 6.3 10E9/L (ref 4–11)

## 2018-04-23 PROCEDURE — 82248 BILIRUBIN DIRECT: CPT | Performed by: SURGERY

## 2018-04-23 PROCEDURE — 84478 ASSAY OF TRIGLYCERIDES: CPT | Performed by: SURGERY

## 2018-04-23 PROCEDURE — 80053 COMPREHEN METABOLIC PANEL: CPT | Performed by: SURGERY

## 2018-04-23 PROCEDURE — 83735 ASSAY OF MAGNESIUM: CPT | Performed by: SURGERY

## 2018-04-23 PROCEDURE — 84134 ASSAY OF PREALBUMIN: CPT | Performed by: SURGERY

## 2018-04-23 PROCEDURE — 85025 COMPLETE CBC W/AUTO DIFF WBC: CPT | Performed by: SURGERY

## 2018-04-23 PROCEDURE — 84100 ASSAY OF PHOSPHORUS: CPT | Performed by: SURGERY

## 2018-04-23 RX ORDER — FENTANYL 25 UG/1
1 PATCH TRANSDERMAL
Qty: 15 PATCH | Refills: 0 | Status: SHIPPED | OUTPATIENT
Start: 2018-04-23 | End: 2018-04-24

## 2018-04-23 RX ORDER — FENTANYL 12.5 UG/1
1 PATCH TRANSDERMAL
Qty: 15 PATCH | Refills: 0 | Status: SHIPPED | OUTPATIENT
Start: 2018-04-23 | End: 2018-04-24

## 2018-04-23 NOTE — TELEPHONE ENCOUNTER
Medication refill information reviewed.  Patient misti received 10 patches of each dose on 4/13/18 to start on 4/15/18 related to a quantity limit PA. Has since got his PA approved and would like these prescription mailed however he needs updated UDS and could schedule nurse visit and  scripts at [facility].    Due date for both    fentaNYL (DURAGESIC) 12 mcg/hr 72 hr patch      fentaNYL (DURAGESIC) 25 mcg/hr 72 hr patch is 5/5/18     Prescriptions prepped for review.     Will route to provider.

## 2018-04-23 NOTE — TELEPHONE ENCOUNTER
Per Dr. Milligan, UDS could be done at a location closer to home. Signed script was given to [facility] Nurse.  Signed script can be mail to [facility].     Lyndsay Solo MA

## 2018-04-23 NOTE — TELEPHONE ENCOUNTER
Mychart sent to pt:  From: Demetrice Yeh RN        Created: 4/23/2018 4:19 PM       Francisco Canales.  You are do for a urine drug screen.  This can be done at any [facility] location.  The order is in.  We CANNOT mail your scripts until you give the specimen.  Once given let us know right away and we can put the scripts in the mail.    Demetrice Yeh RN-BSN  [facility]        The scripts were placed in trigger point injection cart in [place].  To be mailed once urine drug screen specimen is given.    Keep encounter open.    Demetrice Yeh RN-BSN  [facility]

## 2018-04-23 NOTE — TELEPHONE ENCOUNTER
Patient requesting refill(s) of fentaNYL (DURAGESIC) 12 mcg/hr 72 hr patch   Last picked up from pharmacy on 04/13/18    fentaNYL (DURAGESIC) 25 mcg/hr 72 hr patch  Last picked up from pharmacy on 04/13/18    Pt last seen by prescribing provider on 4/4/18  Next appt scheduled for 6/27/18     checked in the past 6 months? Yes If no, print current report and give to RN    Last urine drug screen date 4/5/17  Current opioid agreement on file (completed within the last year) Yes Date of opioid agreement: 12/7/17    Processing (pick one)     Mail to [facility] [place]  [address] [address]    Will route to nursing pool for review and preparation of prescription(s).

## 2018-04-23 NOTE — TELEPHONE ENCOUNTER
UDS could be done at a location closer to home.  Please ask that this be done in the next 2 days.    Signed Prescriptions:                        Disp   Refills    fentaNYL (DURAGESIC) 12 mcg/hr 72 hr patch 15 pat*0        Sig: Place 1 patch onto the skin every 48 hours remove old           patch. Release on/after 5/3/18 to start on/after           5/5/18  Authorizing Provider: BRANDYN JENKINS    fentaNYL (DURAGESIC) 25 mcg/hr 72 hr patch 15 pat*0        Sig: Place 1 patch onto the skin every 48 hours remove old           patch.  Release on/after 5/3/18 to start on/after           5/5/18  Authorizing Provider: BRANDYN JENKINS MD  [facility]

## 2018-04-24 LAB — PREALB SERPL IA-MCNC: 24 MG/DL (ref 15–45)

## 2018-04-24 RX ORDER — FENTANYL 25 UG/1
1 PATCH TRANSDERMAL
Qty: 15 PATCH | Refills: 0 | Status: SHIPPED | OUTPATIENT
Start: 2018-04-24 | End: 2018-05-17

## 2018-04-24 RX ORDER — FENTANYL 12.5 UG/1
1 PATCH TRANSDERMAL
Qty: 15 PATCH | Refills: 0 | Status: SHIPPED | OUTPATIENT
Start: 2018-04-24 | End: 2018-05-17

## 2018-04-24 NOTE — PROGRESS NOTES
This is a recent snapshot of the patient's [facility] medical record.  For current drug dose and complete information and questions, call [phone] or In Basket pool, fv home infusion ([id number])  CSN Number:  [id number]

## 2018-04-24 NOTE — TELEPHONE ENCOUNTER
Pt has not read his [place] message.  Called pt on home # and left message (generic voice mail) that a lab appointment is due before his refills can be processed further.    Pt to call back and ask to be transferred to a nurse.    Demetrice Yeh RN-BSN  [facility]

## 2018-04-24 NOTE — TELEPHONE ENCOUNTER
The signed fentanyl 12mcg and 25mcg scripts(s) were mailed to       [facility] [place], [place] - [address] [place] [address]  Phone: [phone] Fax: [phone]    To go out in tomorrow's mail.    Demetrice Yeh RN-BSN  [facility]

## 2018-04-24 NOTE — TELEPHONE ENCOUNTER
Spoke with Cammy wife. She reports she would like the prescriptions mailed because they were just there for an OV and told they did not need an updated UDS. Confirmed with Dr. Milligan. She agreed to take Parker to a [place] near home to complete the UDS tomorrow morning. Will reach out to [facility] to put script in outgoing mail.    YADIRA LazarN, RN  Care Coordinator  [facility]

## 2018-04-25 ENCOUNTER — HOSPITAL ENCOUNTER (EMERGENCY)
Facility: CLINIC | Age: 48
Discharge: HOME | End: 2018-04-26
Attending: FAMILY MEDICINE | Admitting: FAMILY MEDICINE
Payer: COMMERCIAL

## 2018-04-25 ENCOUNTER — TELEPHONE (OUTPATIENT)
Dept: FAMILY MEDICINE | Facility: CLINIC | Age: 48
End: 2018-04-25

## 2018-04-25 DIAGNOSIS — Z79.891 ENCOUNTER FOR LONG-TERM OPIATE ANALGESIC USE: ICD-10-CM

## 2018-04-25 DIAGNOSIS — R09.1 PLEURISY: ICD-10-CM

## 2018-04-25 LAB
BASOPHILS # BLD AUTO: 0 10E9/L (ref 0–0.2)
BASOPHILS NFR BLD AUTO: 0.5 %
DIFFERENTIAL METHOD BLD: ABNORMAL
EOSINOPHIL # BLD AUTO: 0.3 10E9/L (ref 0–0.7)
EOSINOPHIL NFR BLD AUTO: 4.6 %
ERYTHROCYTE [DISTWIDTH] IN BLOOD BY AUTOMATED COUNT: 12.6 % (ref 10–15)
HCT VFR BLD AUTO: 41.3 % (ref 40–53)
HGB BLD-MCNC: 13.6 G/DL (ref 13.3–17.7)
IMM GRANULOCYTES # BLD: 0 10E9/L (ref 0–0.4)
IMM GRANULOCYTES NFR BLD: 0.2 %
LYMPHOCYTES # BLD AUTO: 2.2 10E9/L (ref 0.8–5.3)
LYMPHOCYTES NFR BLD AUTO: 34.5 %
MCH RBC QN AUTO: 31.6 PG (ref 26.5–33)
MCHC RBC AUTO-ENTMCNC: 32.9 G/DL (ref 31.5–36.5)
MCV RBC AUTO: 96 FL (ref 78–100)
MONOCYTES # BLD AUTO: 0.8 10E9/L (ref 0–1.3)
MONOCYTES NFR BLD AUTO: 13.4 %
NEUTROPHILS # BLD AUTO: 2.9 10E9/L (ref 1.6–8.3)
NEUTROPHILS NFR BLD AUTO: 46.8 %
PLATELET # BLD AUTO: 130 10E9/L (ref 150–450)
RBC # BLD AUTO: 4.3 10E12/L (ref 4.4–5.9)
WBC # BLD AUTO: 6.3 10E9/L (ref 4–11)

## 2018-04-25 PROCEDURE — 85379 FIBRIN DEGRADATION QUANT: CPT | Performed by: FAMILY MEDICINE

## 2018-04-25 PROCEDURE — 84484 ASSAY OF TROPONIN QUANT: CPT | Performed by: FAMILY MEDICINE

## 2018-04-25 PROCEDURE — 25000132 ZZH RX MED GY IP 250 OP 250 PS 637: Performed by: FAMILY MEDICINE

## 2018-04-25 PROCEDURE — 93005 ELECTROCARDIOGRAM TRACING: CPT | Performed by: FAMILY MEDICINE

## 2018-04-25 PROCEDURE — 80307 DRUG TEST PRSMV CHEM ANLYZR: CPT | Mod: 90 | Performed by: PSYCHIATRY & NEUROLOGY

## 2018-04-25 PROCEDURE — 93010 ELECTROCARDIOGRAM REPORT: CPT | Mod: Z6 | Performed by: FAMILY MEDICINE

## 2018-04-25 PROCEDURE — 99000 SPECIMEN HANDLING OFFICE-LAB: CPT | Performed by: PSYCHIATRY & NEUROLOGY

## 2018-04-25 PROCEDURE — 99285 EMERGENCY DEPT VISIT HI MDM: CPT | Mod: 25 | Performed by: FAMILY MEDICINE

## 2018-04-25 PROCEDURE — 85025 COMPLETE CBC W/AUTO DIFF WBC: CPT | Performed by: FAMILY MEDICINE

## 2018-04-25 PROCEDURE — 80048 BASIC METABOLIC PNL TOTAL CA: CPT | Performed by: FAMILY MEDICINE

## 2018-04-25 PROCEDURE — 25000125 ZZHC RX 250: Performed by: FAMILY MEDICINE

## 2018-04-25 RX ORDER — ONDANSETRON 4 MG/1
4 TABLET, ORALLY DISINTEGRATING ORAL ONCE
Status: COMPLETED | OUTPATIENT
Start: 2018-04-25 | End: 2018-04-25

## 2018-04-25 RX ORDER — ALUMINA, MAGNESIA, AND SIMETHICONE 2400; 2400; 240 MG/30ML; MG/30ML; MG/30ML
15 SUSPENSION ORAL ONCE
Status: COMPLETED | OUTPATIENT
Start: 2018-04-25 | End: 2018-04-25

## 2018-04-25 RX ADMIN — ONDANSETRON 4 MG: 4 TABLET, ORALLY DISINTEGRATING ORAL at 23:51

## 2018-04-25 RX ADMIN — ALUMINUM HYDROXIDE, MAGNESIUM HYDROXIDE, AND DIMETHICONE 15 ML: 400; 400; 40 SUSPENSION ORAL at 23:34

## 2018-04-25 RX ADMIN — LIDOCAINE HYDROCHLORIDE 15 ML: 20 SOLUTION ORAL; TOPICAL at 23:34

## 2018-04-25 NOTE — PROGRESS NOTES
This is a recent snapshot of the patient's [facility] medical record.  For current drug dose and complete information and questions, call [phone] or In Basket pool, [facility] home infusion ([id number])  CSN Number:  [id number]

## 2018-04-25 NOTE — TELEPHONE ENCOUNTER
Parker Acevedo Sr is a 47 year old male who calls with Chest tightness.    NURSING ASSESSMENT:  Description:  I spoke with pt wife, tightness in his chest where his azevedo is, numbness in both arms and fingers. Azevedo is used for hydration and TPN. Temp has been 97.6-100.2. It looks like the azevedo has moved out a little per wife  Onset/duration:  Couple of days  Precip. factors:  Complicated medical history  Associated symptoms:  Has been dizzy on and off. More headaches then usual.   Improves/worsens symptoms:  Pt states when he pushes on the azevedo the chest tightness gets better.    Allergies:   Allergies   Allergen Reactions     Bactrim [Sulfamethoxazole W/Trimethoprim] Rash     Penicillins Anaphylaxis     Doxycycline Rash     Vancomycin Rash     Rash after receiving vancomycin 3/28/16 (red man's?). Tolerated with slower infusion and diphenhydramine premed.     RECOMMENDED DISPOSITION:  To ED, another person to drive   Will comply with recommendation: Yes  If further questions/concerns or if symptoms do not improve, worsen or new symptoms develop, call your PCP or [facility] as soon as possible.      Guideline used: Chest pain  Telephone Triage Protocols for Nurses, Fifth Edition, Rand Aponte RN

## 2018-04-25 NOTE — ED AVS SNAPSHOT
[facility] Emergency Department    [address] [place] [address]    Phone:  [phone]    Fax:  [phone]                                       Parker Acevedo Sr   MRN: [id number]    Department:  [facility] Emergency Department   Date of Visit:  4/25/2018           After Visit Summary Signature Page     I have received my discharge instructions, and my questions have been answered. I have discussed any challenges I see with this plan with the nurse or doctor.    ..........................................................................................................................................  Patient/Patient Representative Signature      ..........................................................................................................................................  Patient Representative Print Name and Relationship to Patient    ..................................................               ................................................  Date                                            Time    ..........................................................................................................................................  Reviewed by Signature/Title    ...................................................              ..............................................  Date                                                            Time

## 2018-04-25 NOTE — ED AVS SNAPSHOT
[facility] Emergency Department    [address]     [place] [address]    Phone:  [phone]    Fax:  [phone]                                       Parker Acevedo Sr   MRN: [id number]    Department:  [facility] Emergency Department   Date of Visit:  4/25/2018           Patient Information     Date Of Birth          1970        Your diagnoses for this visit were:     Pleurisy        You were seen by Collin Chahal MD.      Follow-up Information     Follow up with Esteban Daly MD.    Specialty:  Family Practice    Contact information:    [address] [place] [address]  [phone]          Discharge Instructions       Tylenol as needed for pain.  You can use your other pain medicines as needed as well.  Prednisone might be helpful so I put you on a small dose so as not to upset your stomach.  Take it with food.  Recheck in clinic if persistent problems or return to the ED if you develop a fever over 101, significantly productive cough, increasing pain, coughing up blood, or any concerns.  It was a pleasure visiting with both of you this evening.  I hope this settles down quickly for you.    Thank you for choosing [facility]. We appreciate the opportunity to meet your urgent medical needs. Please let us know if we could have done anything to make your stay more satisfying.    After discharge, please closely monitor for any new or worsening symptoms. Return to the Emergency Department if you develop any acute worsening signs or symptoms.    If you had lab work, cultures or imaging studies done during your stay, the final results may still be pending. We will call you if your plan of care needs to change. However, if you are not improving as expected, please follow up with your primary care provider or clinic.     Start any prescription medications that were prescribed to you and take them as directed.     Please see additional  handouts that may be pertinent to your condition.        Pleurisy    If you have pleurisy, the lining around your lungs is inflamed. This is most often due to a viral infection or pneumonia. It usually lasts for 10 to 14 days. It may cause sharp pain with breathing, coughing, sneezing, and movement. Antibiotics are usually not prescribed for this condition unless pneumonia is also present.  The following tips will help you care for your condition at home:    If symptoms are severe, rest at home for the first 2 to 3 days. When you resume activity, don't let yourself get too tired.    Don't smoke. Also stay away from secondhand smoke.    You may use over-the-counter medicines to control pain, unless another pain medicine was prescribed. (Note: If you have chronic liver or kidney disease or have ever had a stomach ulcer or gastrointestinal bleeding, talk with your healthcare provider before using these medicines. Also talk to your provider if you are taking medicine to prevent blood clots.) Aspirin should never be given to anyone younger than 18 years of age who is ill with a viral infection or fever. It may cause severe liver or brain damage.  Follow-up care  Follow up with your healthcare provider, or as advised.  When to seek medical advice  Call your healthcare provider right away if any of these occur:    Fever of 100.4 F (38 C) or higher, or as directed by your healthcare provider    Coughing up lots of colored sputum (mucus)    Redness, pain, or swelling of the leg  Call 911  Call 911 if any of these occur:    Increasing shortness of breath    Increasing chest pain, or pain that spreads to the neck, arm, or back    Coughing up blood  Date Last Reviewed: 9/13/2015 2000-2017 [employer]. [address] [place] [address]. All rights reserved. This information is not intended as a substitute for professional medical care. Always follow your healthcare professional's  instructions.          Your next 10 appointments already scheduled     May 01, 2018 11:40 AM CDT   Office Visit with Esteban Daly MD   [facility]    [address]  [place] [id number] [phone]           Bring a current list of meds and any records pertaining to this visit. For Physicals, please bring immunization records and any forms needing to be filled out. Please arrive 10 minutes early to complete paperwork.            Jun 21, 2018 11:20 AM CDT   (Arrive by 11:05 AM)   RETURN BARIATRIC SURGERY with Francis Vyas MD   [facility] ([facility])    [address]  [address]  [facility] [address] [phone]            Jun 27, 2018  1:00 PM CDT   Return Visit with Patti Milligan MD   [facility] ([facility])    [address] [address] [phone]              24 Hour Appointment Hotline       To make an appointment at any [facility], call [phone] ([phone]). If you don't have a family doctor or clinic, we will help you find one. [facility] are conveniently located to serve the needs of you and your family.             Review of your medicines      START taking        Dose / Directions Last dose taken    predniSONE 10 MG tablet   Commonly known as:  DELTASONE   Quantity:  9 tablet        Daily tapering dose:  20/d x 3 days, 10/d x 3 days   Refills:  0          Our records show that you are taking the medicines listed below. If these are incorrect, please call your family doctor or clinic.        Dose / Directions Last dose taken    albuterol 108 (90 Base) MCG/ACT Inhaler   Commonly known as:  PROAIR HFA/PROVENTIL HFA/VENTOLIN HFA   Dose:  2 puff   Quantity:  1 Inhaler        Inhale 2 puffs into the lungs every 4 hours as needed for shortness of breath / dyspnea or wheezing   Refills:   "3        * amphetamine-dextroamphetamine 20 MG per tablet   Commonly known as:  ADDERALL   Dose:  20 mg   Quantity:  60 tablet        Take 1 tablet (20 mg) by mouth 2 times daily   Refills:  0        * amphetamine-dextroamphetamine 20 MG per tablet   Commonly known as:  ADDERALL   Dose:  20 mg   Quantity:  60 tablet        Take 1 tablet (20 mg) by mouth 2 times daily   Refills:  0        * amphetamine-dextroamphetamine 20 MG per tablet   Commonly known as:  ADDERALL   Dose:  20 mg   Quantity:  60 tablet        Take 1 tablet (20 mg) by mouth 2 times daily   Refills:  0        COREG PO   Dose:  12.5 mg        Take 12.5 mg by mouth 2 times daily   Refills:  0        * cyanocobalamin 1000 MCG/ML injection   Commonly known as:  VITAMIN B12   Dose:  1 mL        Inject 1 mL into the muscle every 30 days   Refills:  0        * cyanocobalamin 1000 MCG/ML injection   Commonly known as:  VITAMIN B12   Dose:  1 mL   Quantity:  1 mL        Inject 1 mL (1,000 mcg) into the muscle every 30 days   Refills:  11        diphenhydrAMINE 12.5 MG/5ML solution   Commonly known as:  BENADRYL   Dose:  25 mg   Quantity:  480 mL        Take 10 mLs (25 mg) by mouth 2 times daily   Refills:  0        * [facility]IENT        Contact [facility] for patient specific medication information at [phone] on admission and discharge from the hospital.  Phones are answered 24 hours a day 7 days a week 365 days a year.  Providers - Choose \"CONTINUE HOME MED (no script)\" at discharge if patient treatment with home infusion will continue.   Refills:  0        * [facility] PATIENT        Contact [facility] for patient specific medication information at [phone] on admission and discharge from the hospital.  Phones are answered 24 hours a day 7 days a week 365 days a year.  Providers - Choose \"CONTINUE HOME MED (no script)\" at discharge if patient treatment with home infusion will " "continue.   Refills:  0        * fentaNYL 25 mcg/hr 72 hr patch   Commonly known as:  DURAGESIC   Dose:  1 patch   Quantity:  15 patch        Place 1 patch onto the skin every 48 hours remove old patch.  Release on/after 5/3/18 to start on/after 5/5/18   Refills:  0        * fentaNYL 12 mcg/hr 72 hr patch   Commonly known as:  DURAGESIC   Dose:  1 patch   Quantity:  15 patch        Place 1 patch onto the skin every 48 hours remove old patch. Release on/after 5/3/18 to start on/after 5/5/18   Refills:  0        levofloxacin 500 MG tablet   Commonly known as:  LEVAQUIN   Dose:  500 mg   Quantity:  10 tablet        Take 1 tablet (500 mg) by mouth daily   Refills:  0        lidocaine 5 % Patch   Commonly known as:  LIDODERM   Dose:  1-2 patch   Quantity:  60 patch        Place 1-2 patches onto the skin every 24 hours Wear for 12 hours, remove for 12 hours.  OK to cut to better fit to size.   Refills:  6        lidocaine-prilocaine cream   Commonly known as:  EMLA   Quantity:  30 g        Apply topically as needed for moderate pain   Refills:  11        morphine 0.1% in intrasite topical gel   Quantity:  100 g        Apply as needed prior to accessing the port site.   Refills:  0        MULTI-VITAMINS Tabs   Dose:  1 tablet        Take 1 tablet by mouth   Refills:  0        mupirocin 2 % ointment   Commonly known as:  BACTROBAN   Quantity:  30 g        Apply topically 3 times daily   Refills:  3        naloxone nasal spray   Commonly known as:  NARCAN   Dose:  4 mg   Quantity:  0.2 mL        Spray 1 spray (4 mg) into one nostril alternating nostrils as needed for opioid reversal (every 2-3 minutes until assistance arrives.)   Refills:  0        Needle (Disp) 27G X 1/2\" Misc   Commonly known as:  BD DISP NEEDLES   Dose:  1 Device   Quantity:  3 each        1 Device every 30 days Use for cyanocobalamin injection once q 30 days.   Refills:  4        nystatin-triamcinolone cream   Commonly known as:  MYCOLOG II   Quantity:  " 60 g        Apply topically 2 times daily as needed   Refills:  5        ondansetron 8 MG ODT tab   Commonly known as:  ZOFRAN-ODT   Dose:  8 mg   Quantity:  90 tablet        Take 1 tablet (8 mg) by mouth every 8 hours as needed for nausea   Refills:  3        * order for DME   Quantity:  12 each        Injection Supplies for Vitamin B12: 3cc syringes w/ 27 gauge needles, 1/2 inch length   Refills:  0        * order for DME   Quantity:  4 each        Equipment being ordered: Bilateral knee high chronic venous insufficiency stockings--  mild-moderate pressures.   Refills:  5        oxyCODONE 5 MG/5ML solution   Commonly known as:  ROXICODONE   Dose:  10-15 mg   Quantity:  1350 mL        Take 10-15 mLs (10-15 mg) by mouth every 4 hours as needed for moderate to severe pain Max of 45 mg per day. Fill on/after 4/13/18 not to start untill 4/15/18.   Refills:  0        sucralfate 1 GM/10ML suspension   Commonly known as:  CARAFATE   Dose:  1 g   Quantity:  1200 mL        Take 10 mLs (1 g) by mouth 4 times daily   Refills:  11        vitamin D 66872 UNIT capsule   Commonly known as:  ERGOCALCIFEROL   Dose:  81273 Units   Quantity:  12 capsule        Take 1 capsule (50,000 Units) by mouth every 7 days   Refills:  3        * Notice:  This list has 11 medication(s) that are the same as other medications prescribed for you. Read the directions carefully, and ask your doctor or other care provider to review them with you.            Prescriptions were sent or printed at these locations (1 Prescription)                   [facility] [place] [facility] - [address] [address]    Telephone:  [phone]   Fax:  [phone]   Hours:                  E-Prescribed (1 of 1)         predniSONE (DELTASONE) 10 MG tablet                Procedures and tests performed during your visit     Basic metabolic panel    CBC with platelets differential    D dimer quantitative    EKG  12-lead, tracing only    Troponin I    XR Chest 2 Views      Orders Needing Specimen Collection     None      Pending Results     Date and Time Order Name Status Description    4/26/2018 0033 XR Chest 2 Views Preliminary     4/25/2018 1323 PAIN DRUG SCR UR W RPTD MEDS In process             Pending Culture Results     Date and Time Order Name Status Description    4/25/2018 1323 PAIN DRUG SCR UR W RPTD MEDS In process             Pending Results Instructions     If you had any lab results that were not finalized at the time of your Discharge, you can call the ED Lab Result RN at [phone]. You will be contacted by this team for any positive Lab results or changes in treatment. The nurses are available 7 days a week from 10A to 6:30P.  You can leave a message 24 hours per day and they will return your call.        Thank you for choosing [facility]       Thank you for choosing [facility] for your care. Our goal is always to provide you with excellent care. Hearing back from our patients is one way we can continue to improve our services. Please take a few minutes to complete the written survey that you may receive in the mail after you visit with us. Thank you!        [address] Information     [facility] gives you secure access to your electronic health record. If you see a primary care provider, you can also send messages to your care team and make appointments. If you have questions, please call your primary care clinic.  If you do not have a primary care provider, please call [phone] and they will assist you.        Care EveryWhere ID     This is your Care EveryWhere ID. This could be used by other organizations to access your [facility] medical records  [id number]        Equal Access to Services     [facility] : Hadii kameron fregosoo Soheike, waaxda luqadaha, qaybta kaalmada dayami, carmela rizvi. So [facility] [phone].    ATENCIÓN: Si habla español, tiene a hicks disposición servicios  андрей de asistencia lingüística. Chu camp [phone].    We comply with applicable federal civil rights laws and [place] laws. We do not discriminate on the basis of race, color, national origin, age, disability, sex, sexual orientation, or gender identity.            After Visit Summary       This is your record. Keep this with you and show to your community pharmacist(s) and doctor(s) at your next visit.

## 2018-04-26 ENCOUNTER — CARE COORDINATION (OUTPATIENT)
Dept: SURGERY | Facility: CLINIC | Age: 48
End: 2018-04-26

## 2018-04-26 ENCOUNTER — HOME INFUSION (PRE-WILLOW HOME INFUSION) (OUTPATIENT)
Dept: PHARMACY | Facility: CLINIC | Age: 48
End: 2018-04-26

## 2018-04-26 ENCOUNTER — MYC MEDICAL ADVICE (OUTPATIENT)
Dept: PALLIATIVE MEDICINE | Facility: CLINIC | Age: 48
End: 2018-04-26

## 2018-04-26 ENCOUNTER — APPOINTMENT (OUTPATIENT)
Dept: GENERAL RADIOLOGY | Facility: CLINIC | Age: 48
End: 2018-04-26
Attending: FAMILY MEDICINE
Payer: COMMERCIAL

## 2018-04-26 VITALS
BODY MASS INDEX: 24.64 KG/M2 | SYSTOLIC BLOOD PRESSURE: 136 MMHG | HEIGHT: 71 IN | WEIGHT: 176 LBS | DIASTOLIC BLOOD PRESSURE: 88 MMHG | TEMPERATURE: 98.1 F | RESPIRATION RATE: 20 BRPM | OXYGEN SATURATION: 99 %

## 2018-04-26 LAB
ANION GAP SERPL CALCULATED.3IONS-SCNC: 5 MMOL/L (ref 3–14)
BUN SERPL-MCNC: 10 MG/DL (ref 7–30)
CALCIUM SERPL-MCNC: 8.3 MG/DL (ref 8.5–10.1)
CHLORIDE SERPL-SCNC: 110 MMOL/L (ref 94–109)
CO2 SERPL-SCNC: 28 MMOL/L (ref 20–32)
CREAT SERPL-MCNC: 0.65 MG/DL (ref 0.66–1.25)
D DIMER PPP FEU-MCNC: 0.3 UG/ML FEU (ref 0–0.5)
GFR SERPL CREATININE-BSD FRML MDRD: >90 ML/MIN/1.7M2
GLUCOSE SERPL-MCNC: 93 MG/DL (ref 70–99)
POTASSIUM SERPL-SCNC: 3.4 MMOL/L (ref 3.4–5.3)
SODIUM SERPL-SCNC: 143 MMOL/L (ref 133–144)
TROPONIN I SERPL-MCNC: <0.015 UG/L (ref 0–0.04)

## 2018-04-26 PROCEDURE — 71046 X-RAY EXAM CHEST 2 VIEWS: CPT | Mod: TC

## 2018-04-26 RX ORDER — PREDNISONE 10 MG/1
TABLET ORAL
Qty: 9 TABLET | Refills: 0 | Status: SHIPPED | OUTPATIENT
Start: 2018-04-26 | End: 2018-06-01

## 2018-04-26 NOTE — PROGRESS NOTES
[facility] calling in regards to patients Cm wondering if they can stop sterile dressings. Frequently been undressed and has been there since February they will just keep it clean dressed no need for sterile at this point per Resident Dr. Yancey

## 2018-04-26 NOTE — ED PROVIDER NOTES
History     Chief Complaint   Patient presents with     Chest Pain     HPI  Parker Acevedo Sr is a 47 year old male who resents to the ED with pleuritic chest pain for the past 3 or 4 days.  It is sharp and stabbing and worse with deep inspiration.  He points to either side of his sternum.  Some slight shortness of breath.  Also noticed that his blood pressure was up a little bit at home at 140/99.  He is on Coreg.  Also has lots of reflux and has a port in his right subclavian vein for TPN and hydration.  He has had line infections in the past and previously had a PowerPort.  Also gets lots of heartburn and is wondering about a GI cocktail.  Describes a numb sensation on the dorsal aspect of his forearms bilaterally.  No weakness.  He has had no fevers.  No cough.  Has some chronic lower extremity edema but nothing out of the ordinary.  Stitches were taken out that hold the central line in place a couple of weeks ago and he did inadvertently apply some traction to it.  It still seems to be functioning normally for his TPN.    Problem List:    Patient Active Problem List    Diagnosis Date Noted     Generalized weakness 01/30/2018     Priority: Medium     E coli bacteremia 01/13/2018     Priority: Medium     Catheter-related bloodstream infection (CRBSI) 09/23/2017     Priority: Medium     Low serum iron 09/08/2017     Priority: Medium     Anemia, iron deficiency 09/08/2017     Priority: Medium     Bacteremia 06/29/2017     Priority: Medium     Abnormal echocardiogram 04/11/2017     Priority: Medium     Port or reservoir infection, initial encounter 03/16/2017     Priority: Medium     Status post cervical spinal arthrodesis 02/15/2017     Priority: Medium     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)      Priority: Medium     mild EF ~45% on rest 2/13/17, improves with stressing       Severe malnutrition (H)      Priority: Medium     TPN       Short gut syndrome      Priority: Medium     Anxiety       Priority: Medium     Gastrostomy tube in place (H) 08/09/2016     Priority: Medium     Chronic nausea 05/10/2016     Priority: Medium     Fungemia 04/11/2016     Priority: Medium     Positive blood culture 03/29/2016     Priority: Medium     Insomnia 08/13/2015     Priority: Medium     Chronic pain 07/07/2015     Priority: Medium     Patient is followed by Dr. Milligan for ongoing prescription of pain medication.  All refills should be approved by this provider, or covering partner.    Medication(s): Fentanyl 50 mcg patches every three days/ Liquid oxycodone 5 mg/5ml up to 50 mg daily.   Maximum quantity per month: as per Dr. Milligan through [facility]  Clinic visit frequency required: Q 3 months at Pain Management    Controlled substance agreement on file: Yes       Date(s): Through Pain Management    Pain Clinic evaluation in the past: Yes       Date(s):  Ongoing every three months       Location(s):  Per pain management    DIRE Total Score(s):  No flowsheet data found.    Last [facility] website verification:  Through [facility]   [email or web address]    Esteban Daly MD             Health Care Home 02/24/2015     Priority: Medium     Status:  Accepted  Care Coordinator:  Melissa Behl BSN, RN, PHN  [facility] Care Coordinator  [phone]     See Letters for [facility] Care Plan  Date:  April 4, 2016            Iron deficiency 05/23/2014     Priority: Medium     Vitamin D deficiency 05/22/2014     Priority: Medium     Former smoker 02/24/2014     Priority: Medium     Bile reflux esophagitis 10/16/2013     Priority: Medium     Constipation 10/01/2013     Priority: Medium     Chronic anxiety 09/25/2013     Priority: Medium     Dysphagia 09/17/2013     Priority: Medium     Weight loss, non-intentional 09/17/2013     Priority: Medium     Malnutrition (H) 09/17/2013     Priority: Medium     Dehydration 08/28/2013     Priority: Medium     Vitamin B12 deficiency without anemia 07/31/2013     Priority:  Medium     Diagnosis updated by automated process. Provider to review and confirm.       Thiamine deficiency 07/31/2013     Priority: Medium     ADHD (attention deficit hyperactivity disorder), inattentive type 07/02/2013     Priority: Medium     Anemia 09/20/2012     Priority: Medium     Vomiting 06/28/2012     Priority: Medium     Ulcer (H) 06/22/2012     Priority: Medium     Chronic abdominal pain 06/01/2012     Priority: Medium     Patient is followed by ALEXANDRIA WHITLEY for ongoing prescription of narcotic pain medicine.  Med: liquid oxycodone up to 60 mg per day.   Maximum use per month:   Expected duration: ongoing, hope per surgery that will resolve with upcoming surgery  Narcotic agreement on file: YES  Clinic visit recommended: Q 3 months         CARDIOVASCULAR SCREENING; LDL GOAL LESS THAN 160 10/31/2010     Priority: Medium     Peptic ulcer disease 04/08/2010     Priority: Medium     Gastric bypass status for obesity 04/08/2010     Priority: Medium     Top weight of 492.          Past Medical History:    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)      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)      Chronic abdominal pain      Difficulty swallowing      Gastric ulcer, unspecified as acute or chronic, without mention of hemorrhage, perforation, or obstruction      Gastro-oesophageal reflux disease      Generalized weakness 1/30/2018     Head injury      Hiatal hernia      Other bladder disorder      Other chronic pain      Severe malnutrition (H)      Short gut syndrome      Tobacco abuse        Past Surgical History:    Past Surgical History:   Procedure Laterality Date     AMPUTATION       APPENDECTOMY       BACK SURGERY  11/3/2014    curve in the spine     BIOPSY LYMPH NODE CERVICAL N/A 2/20/2015    Procedure: BIOPSY LYMPH NODE CERVICAL;  Surgeon: Baron Scanlon MD;  Location:  OR      C GASTRIC BYPASS,OBESE<100CM SHAYLEE-EN-Y  2002    lost 300 pounds     CHOLECYSTECTOMY       DISCECTOMY, FUSION CERVICAL ANTERIOR ONE LEVEL, COMBINED N/A 2/15/2017    Procedure: COMBINED DISCECTOMY, FUSION CERVICAL ANTERIOR ONE LEVEL;  Surgeon: Darren Campos MD;  Location: [facility] OR     ENDOSCOPIC INSERTION TUBE GASTROSTOMY  9/9/2013    Procedure: ENDOSCOPIC INSERTION TUBE GASTROSTOMY;;  Surgeon: Francis Vyas MD;  [facility]     ENDOSCOPIC ULTRASOUND UPPER GASTROINTESTINAL TRACT (GI)  4/29/2011    Procedure:ENDOSCOPIC ULTRASOUND UPPER GASTROINTESTINAL TRACT (GI); Both Procedures done Conjointly; Surgeon:NEREIDA HOUSER; Location[facility]     ENDOSCOPIC ULTRASOUND UPPER GASTROINTESTINAL TRACT (GI)  9/9/2013    Procedure: ENDOSCOPIC ULTRASOUND UPPER GASTROINTESTINAL TRACT (GI);  Endoscopic Ultrasound Guide Gastrostomy Tube Placement  C-arm;  Surgeon: Noe Lizarraga MD;  [facility] OR     ENDOSCOPY  03/25/11    EGD, [facility]     ENDOSCOPY  08/04/09    Upper Endoscopy, [facility]     ENDOSCOPY  01/05/09    Upper Endoscopy, [facility]     ESOPHAGOSCOPY, GASTROSCOPY, DUODENOSCOPY (EGD), COMBINED  4/20/2011    Procedure:COMBINED ESOPHAGOSCOPY, GASTROSCOPY, DUODENOSCOPY (EGD); Surgeon:FRANCIS VYAS; [facility]     ESOPHAGOSCOPY, GASTROSCOPY, DUODENOSCOPY (EGD), COMBINED  6/15/2011    Procedure:COMBINED ESOPHAGOSCOPY, GASTROSCOPY, DUODENOSCOPY (EGD); Surgeon:FRANCIS VYAS; [facility]     ESOPHAGOSCOPY, GASTROSCOPY, DUODENOSCOPY (EGD), COMBINED  6/12/2013    Procedure: COMBINED ESOPHAGOSCOPY, GASTROSCOPY, DUODENOSCOPY (EGD);;  Surgeon: Francis Vyas MD;  [facility]     ESOPHAGOSCOPY, GASTROSCOPY, DUODENOSCOPY (EGD), COMBINED  11/22/2013    Procedure: COMBINED ESOPHAGOSCOPY, GASTROSCOPY, DUODENOSCOPY (EGD);;  Surgeon: Francis Vyas MD;  [facility]     ESOPHAGOSCOPY, GASTROSCOPY, DUODENOSCOPY (EGD), COMBINED  4/30/2014    Procedure:  COMBINED ESOPHAGOSCOPY, GASTROSCOPY, DUODENOSCOPY (EGD);  Surgeon: Francis Vyas MD;  Location:  GI     ESOPHAGOSCOPY, GASTROSCOPY, DUODENOSCOPY (EGD), COMBINED N/A 2/20/2015    Procedure: COMBINED ESOPHAGOSCOPY, GASTROSCOPY, DUODENOSCOPY (EGD), BIOPSY SINGLE OR MULTIPLE;  Surgeon: Baron Scanlon MD;  Location: [facility]     ESOPHAGOSCOPY, GASTROSCOPY, DUODENOSCOPY (EGD), COMBINED N/A 9/30/2015    Procedure: COMBINED ESOPHAGOSCOPY, GASTROSCOPY, DUODENOSCOPY (EGD);  Surgeon: Francis Vyas MD;  Location:  GI     GASTRECTOMY  6/22/2012    Procedure: GASTRECTOMY;  Open Approach, Excise Ulcers,Partial Gastrectomy, Esophagojejunostomy, Hiatal Hernia Repair, Extensive Lysis of Adhesions and Esaphagogastrodudenoscopy.;  Surgeon: Francis Vyas MD;  [facility]     GASTROJEJUNOSTOMY  08/26/09    Extensice enterolysis, partial resect. jejunum, part. resect gastric pouch, gastrojejunostomy anastomosis     HC ESOPH/GAS REFLUX TEST W NASAL IMPED ELECTRODE  8/5/2013    Procedure: ESOPHAGEAL IMPEDENCE FUNCTION TEST 1 HOUR OR LESS;  Surgeon: Halie Lang MD;  Location:  GI     HEAD & NECK SURGERY  2/15/2017    C5-C6     HERNIA REPAIR  2006    Umbilical hernia     HERNIORRHAPHY HIATAL  6/22/2012    Procedure: HERNIORRHAPHY HIATAL;;  Surgeon: Francis Vyas MD;  [facility]     HERNIORRHAPHY INGUINAL  11/22/2013    Procedure: HERNIORRHAPHY INGUINAL;;  Surgeon: Francis Vyas MD;  [facility]     INSERT PORT VASCULAR ACCESS Right 12/19/2017    Procedure: INSERT PORT VASCULAR ACCESS;  Right Chest Port Placement ;  Surgeon: Lisandro Alejandro PA-C;  [facility]     LAPAROTOMY EXPLORATORY  11/22/2013    Procedure: LAPAROTOMY EXPLORATORY;  Exploratory Laparotomy, Upper Endoscopy, Left Inguinal Hernia Repair;  Surgeon: Francis Vyas MD;  Location:  OR     ORTHOPEDIC SURGERY       PICC INSERTION Right 03/16/2017    5fr DL BioFlo PICC, 42cm (3cm external) in the  R medial brachial vein w/ tip in the SVC RA junction.     PICC INSERTION Left 09/23/2017    5fr DL BioFlo PICC, 45cm (1cm external) in the L basilic vein w/ tip in the SVC RA junction.     SHAYLEE EN Y BOWEL  2003     SOFT TISSUE SURGERY       SOFT TISSUE SURGERY       THORACIC SURGERY       TONSILLECTOMY         Family History:    Family History   Problem Relation Age of Onset     GASTROINTESTINAL DISEASE Mother      Crohns disease     Anxiety Disorder Mother      Thyroid Disease Mother      Grave's disease     CANCER Father      ear cancer-skin cancer/melanoma     Breast Cancer Maternal Grandmother      Anxiety Disorder Sister      DIABETES Maternal Uncle      Breast Cancer Other      Hypertension No family hx of      Hyperlipidemia No family hx of      CEREBROVASCULAR DISEASE No family hx of      Prostate Cancer No family hx of      Depression No family hx of      Anesthesia Reaction No family hx of      Asthma No family hx of      OSTEOPOROSIS No family hx of      Genetic Disorder No family hx of      Obesity No family hx of      MENTAL ILLNESS No family hx of      Substance Abuse No family hx of        Social History:  Marital Status:   [2]  Social History   Substance Use Topics     Smoking status: Light Tobacco Smoker     Packs/day: 0.10     Years: 3.00     Types: Cigarettes     Smokeless tobacco: Current User      Comment: I use an e cig every now and than     Alcohol use No      Comment: quit         Medications:      fentaNYL (DURAGESIC) 12 mcg/hr 72 hr patch   fentaNYL (DURAGESIC) 25 mcg/hr 72 hr patch   oxyCODONE (ROXICODONE) 5 MG/5ML solution   predniSONE (DELTASONE) 10 MG tablet   albuterol (PROAIR HFA/PROVENTIL HFA/VENTOLIN HFA) 108 (90 BASE) MCG/ACT Inhaler   amphetamine-dextroamphetamine (ADDERALL) 20 MG per tablet   amphetamine-dextroamphetamine (ADDERALL) 20 MG per tablet   amphetamine-dextroamphetamine (ADDERALL) 20 MG per tablet   Carvedilol (COREG PO)   cyanocobalamin (VITAMIN B12) 1000  "MCG/ML injection   cyanocobalamin (VITAMIN B12) 1000 MCG/ML injection   diphenhydrAMINE (BENADRYL) 12.5 MG/5ML solution   [facility] PAT[facility]   [facility]   levofloxacin (LEVAQUIN) 500 MG tablet   lidocaine (LIDODERM) 5 % Patch   lidocaine-prilocaine (EMLA) cream   morphine 0.1% in intrasite topical gel   Multiple Vitamin (MULTI-VITAMINS) TABS   mupirocin (BACTROBAN) 2 % ointment   naloxone (NARCAN) nasal spray   Needle, Disp, (BD DISP NEEDLES) 27G X 1/2\" MISC   nystatin-triamcinolone (MYCOLOG II) cream   ondansetron (ZOFRAN-ODT) 8 MG ODT tab   order for DME   order for DME   sucralfate (CARAFATE) 1 GM/10ML suspension   vitamin D (ERGOCALCIFEROL) 03316 UNIT capsule   [DISCONTINUED] NO ACTIVE MEDICATIONS         Review of Systems   All other systems reviewed and are negative.      Physical Exam   BP: (!) 132/99  Heart Rate: 68  Temp: 98.1  F (36.7  C)  Resp: 20  Height: 180.3 cm (5' 11\")  Weight: 79.8 kg (176 lb)  SpO2: 99 %      Physical Exam   Constitutional: He is oriented to person, place, and time. He appears well-developed and well-nourished. No distress.   HENT:   Head: Normocephalic.   Mouth/Throat: Oropharynx is clear and moist.   Eyes: EOM are normal.   Neck: Neck supple.   Cardiovascular: Normal rate, regular rhythm and intact distal pulses.    No murmur heard.  Pulmonary/Chest: Effort normal and breath sounds normal. No respiratory distress.   Central line is tunneled from his right upper chest superiorly up over top of the clavicle.  The site looks good.  There are no signs of infection.   Abdominal: Soft. Bowel sounds are normal. There is no tenderness.   Musculoskeletal: Normal range of motion. He exhibits edema (1+ bilateral). He exhibits no tenderness.   Neurological: He is alert and oriented to person, place, and time. He has normal strength. No cranial nerve deficit or sensory deficit. Gait normal. GCS eye subscore is 4. GCS verbal subscore is 5. GCS " motor subscore is 6.   Skin: Skin is warm and dry. No rash noted.   Psychiatric: He has a normal mood and affect.       ED Course  (with Medical Decision Making)    EKG shows no acute ischemic changes.  He has pleuritic type pain.  Does have a central line and at risk for clots.  Will check a d-dimer and if that is negative, proceed with a plain chest x-ray.  If it is elevated I will get a chest CT to look for pulmonary emboli.  Either way we will check his line placement and look for a tiny pneumo or infiltrate as well which could be causing his pleuritic pain.  He was wondering about a GI cocktail so that will be administered as well.    GI cocktail was effective.  His white count and d-dimer were normal.  Troponin undetectable.  Chest x-ray ordered.    Wrist x-ray shows a slight her social prominence in the right lower lung that has decreased from his previous study.  No new infiltrates or acute findings.  The central line tip is in the SVC.  He has a white count is normal and his been afebrile so I am not going to treat him with any antibiotics at this time.  If he becomes febrile, will need to reevaluate.  He is comfortable with this plan.    Overall, he is feeling much improved.  He has a history of bleeding ulcers as does not tolerate NSAIDs.  I am going to give him a small dose of prednisone for a few days to see if that helps settle things down without upsetting his stomach too much.  He does have multiple narcotic pain medicines available for pain if need be.       ED Course     Procedures               EKG Interpretation:      Interpreted by Collin Chahal  Time reviewed: 6055  Symptoms at time of EKG: pleuritic pain   Rhythm: normal sinus   Rate: normal  Axis: normal  Ectopy: none  Conduction: normal  ST Segments/ T Waves: No ST-T wave changes  Q Waves: none  Comparison to prior: Unchanged from 9/19/2017    Clinical Impression: normal EKG          Critical Care time:  none               Results  for orders placed or performed during the hospital encounter of 04/25/18 (from the past 24 hour(s))   CBC with platelets differential   Result Value Ref Range    WBC 6.3 4.0 - 11.0 10e9/L    RBC Count 4.30 (L) 4.4 - 5.9 10e12/L    Hemoglobin 13.6 13.3 - 17.7 g/dL    Hematocrit 41.3 40.0 - 53.0 %    MCV 96 78 - 100 fl    MCH 31.6 26.5 - 33.0 pg    MCHC 32.9 31.5 - 36.5 g/dL    RDW 12.6 10.0 - 15.0 %    Platelet Count 130 (L) 150 - 450 10e9/L    Diff Method Automated Method     % Neutrophils 46.8 %    % Lymphocytes 34.5 %    % Monocytes 13.4 %    % Eosinophils 4.6 %    % Basophils 0.5 %    % Immature Granulocytes 0.2 %    Absolute Neutrophil 2.9 1.6 - 8.3 10e9/L    Absolute Lymphocytes 2.2 0.8 - 5.3 10e9/L    Absolute Monocytes 0.8 0.0 - 1.3 10e9/L    Absolute Eosinophils 0.3 0.0 - 0.7 10e9/L    Absolute Basophils 0.0 0.0 - 0.2 10e9/L    Abs Immature Granulocytes 0.0 0 - 0.4 10e9/L   Basic metabolic panel   Result Value Ref Range    Sodium 143 133 - 144 mmol/L    Potassium 3.4 3.4 - 5.3 mmol/L    Chloride 110 (H) 94 - 109 mmol/L    Carbon Dioxide 28 20 - 32 mmol/L    Anion Gap 5 3 - 14 mmol/L    Glucose 93 70 - 99 mg/dL    Urea Nitrogen 10 7 - 30 mg/dL    Creatinine 0.65 (L) 0.66 - 1.25 mg/dL    GFR Estimate >90 >60 mL/min/1.7m2    GFR Estimate If Black >90 >60 mL/min/1.7m2    Calcium 8.3 (L) 8.5 - 10.1 mg/dL   Troponin I   Result Value Ref Range    Troponin I ES <0.015 0.000 - 0.045 ug/L   D dimer quantitative   Result Value Ref Range    D Dimer 0.3 0.0 - 0.50 ug/ml FEU   XR Chest 2 Views    Narrative    XR CHEST 2 VW  4/26/2018 12:57 AM     INDICATION: Pleuritic chest pain.    COMPARISON: 4/26/2018.      Impression    IMPRESSION: Slight interstitial prominence in the right lower lung,  decreased from prior study. No new consolidative infiltrates or other  acute findings. Normal heart size. Small esophageal hiatal hernia.  Central line tip in SVC.    LINDSEY SOLIS MD     *Note: Due to a large number of results  and/or encounters for the requested time period, some results have not been displayed. A complete set of results can be found in Results Review.       Medications   alum & mag hydroxide-simethicone (MYLANTA ES/MAALOX  ES) suspension 15 mL (15 mLs Oral Given 4/25/18 9676)     And   lidocaine (viscous) (XYLOCAINE) 2 % solution 15 mL (15 mLs Mouth/Throat Given 4/25/18 5355)   ondansetron (ZOFRAN-ODT) ODT tab 4 mg (4 mg Oral Given 4/25/18 9463)       Assessments & Plan      I have reviewed the nursing notes.    I have reviewed the findings, diagnosis, plan and need for follow up with the patient.       Discharge Medication List as of 4/26/2018  1:22 AM      START taking these medications    Details   predniSONE (DELTASONE) 10 MG tablet Daily tapering dose:  20/d x 3 days, 10/d x 3 days, Disp-9 tablet, R-0, E-Prescribe             Final diagnoses:   Pleurisy       4/25/2018   [facility] EMERGENCY DEPARTMENT     Collin Chahal MD  04/26/18 0588

## 2018-04-26 NOTE — DISCHARGE INSTRUCTIONS
Tylenol as needed for pain.  You can use your other pain medicines as needed as well.  Prednisone might be helpful so I put you on a small dose so as not to upset your stomach.  Take it with food.  Recheck in clinic if persistent problems or return to the ED if you develop a fever over 101, significantly productive cough, increasing pain, coughing up blood, or any concerns.  It was a pleasure visiting with both of you this evening.  I hope this settles down quickly for you.    Thank you for choosing [facility]. We appreciate the opportunity to meet your urgent medical needs. Please let us know if we could have done anything to make your stay more satisfying.    After discharge, please closely monitor for any new or worsening symptoms. Return to the Emergency Department if you develop any acute worsening signs or symptoms.    If you had lab work, cultures or imaging studies done during your stay, the final results may still be pending. We will call you if your plan of care needs to change. However, if you are not improving as expected, please follow up with your primary care provider or clinic.     Start any prescription medications that were prescribed to you and take them as directed.     Please see additional handouts that may be pertinent to your condition.        Pleurisy    If you have pleurisy, the lining around your lungs is inflamed. This is most often due to a viral infection or pneumonia. It usually lasts for 10 to 14 days. It may cause sharp pain with breathing, coughing, sneezing, and movement. Antibiotics are usually not prescribed for this condition unless pneumonia is also present.  The following tips will help you care for your condition at home:    If symptoms are severe, rest at home for the first 2 to 3 days. When you resume activity, don't let yourself get too tired.    Don't smoke. Also stay away from secondhand smoke.    You may use over-the-counter medicines to control pain,  unless another pain medicine was prescribed. (Note: If you have chronic liver or kidney disease or have ever had a stomach ulcer or gastrointestinal bleeding, talk with your healthcare provider before using these medicines. Also talk to your provider if you are taking medicine to prevent blood clots.) Aspirin should never be given to anyone younger than 18 years of age who is ill with a viral infection or fever. It may cause severe liver or brain damage.  Follow-up care  Follow up with your healthcare provider, or as advised.  When to seek medical advice  Call your healthcare provider right away if any of these occur:    Fever of 100.4 F (38 C) or higher, or as directed by your healthcare provider    Coughing up lots of colored sputum (mucus)    Redness, pain, or swelling of the leg  Call 911  Call 911 if any of these occur:    Increasing shortness of breath    Increasing chest pain, or pain that spreads to the neck, arm, or back    Coughing up blood  Date Last Reviewed: 9/13/2015 2000-2017 [employer]. [address] [place] [address], [place] [address]. All rights reserved. This information is not intended as a substitute for professional medical care. Always follow your healthcare professional's instructions.

## 2018-04-26 NOTE — TELEPHONE ENCOUNTER
Lab was completed on 4/25/18.    YADIRA LazarN, RN  Care Coordinator  [facility] [place]

## 2018-04-26 NOTE — ED TRIAGE NOTES
Pt started about a week ago with some chest pain on and off with numbness in both hands. Concerned he has pulled his central line

## 2018-04-27 ENCOUNTER — PATIENT OUTREACH (OUTPATIENT)
Dept: CARE COORDINATION | Facility: CLINIC | Age: 48
End: 2018-04-27

## 2018-04-27 ENCOUNTER — HOME INFUSION (PRE-WILLOW HOME INFUSION) (OUTPATIENT)
Dept: PHARMACY | Facility: CLINIC | Age: 48
End: 2018-04-27

## 2018-04-27 NOTE — PROGRESS NOTES
SUBJECTIVE:   Parker Acevedo Sr is a 47 year old male who presents to clinic today for the following health issues:      History of Present Illness     Diet:  Other  Frequency of exercise:  2-3 days/week  Duration of exercise:  Less than 15 minutes  Taking medications regularly:  Yes  Medication side effects:  None  Additional concerns today:  No    Medication Follow up of Adderall--discussed ongoing need know that he is not working.  He would like to continue with 20 mg daily in the afternoon to help with overall focusing and functioning.  Will discontinue the 20 mg twice daily.    Taking Medication as prescribed: yes    Side Effects:  None    Medication Helping Symptoms:  yes       Patient is also requesting chest x-ray. Needs inhalers and adderall refilled.     Recent hospitalization at the emergency room suggested a right lower lobe infiltrate and a chest x-rays to be obtained for follow-up.  Over the last week he has had some productive cough with a transient fever yesterday.  Temperature maria esther to 101 .  Some chills.  No evidence of cellulitis near his indwelling port.      Problem list and histories reviewed & adjusted, as indicated.  Additional history: as documented        Current Outpatient Prescriptions   Medication Sig Dispense Refill     albuterol (PROAIR HFA/PROVENTIL HFA/VENTOLIN HFA) 108 (90 Base) MCG/ACT Inhaler Inhale 2 puffs into the lungs every 4 hours as needed for shortness of breath / dyspnea or wheezing 1 Inhaler 3     [START ON 7/6/2018] amphetamine-dextroamphetamine (ADDERALL) 20 MG per tablet Take 1 tablet (20 mg) by mouth daily 30 tablet 0     [START ON 6/6/2018] amphetamine-dextroamphetamine (ADDERALL) 20 MG per tablet Take 1 tablet (20 mg) by mouth daily 30 tablet 0     [START ON 5/7/2018] amphetamine-dextroamphetamine (ADDERALL) 20 MG per tablet Take 1 tablet (20 mg) by mouth daily 30 tablet 0     Carvedilol (COREG PO) Take 12.5 mg by mouth 2 times daily       cyanocobalamin  "(VITAMIN B12) 1000 MCG/ML injection Inject 1 mL into the muscle every 30 days       cyanocobalamin (VITAMIN B12) 1000 MCG/ML injection Inject 1 mL (1,000 mcg) into the muscle every 30 days 1 mL 11     [facility] INFUSION MANAGED PATIENT Contact [facility] for patient specific medication information at [phone] on admission and discharge from the hospital.  Phones are answered 24 hours a day 7 days a week 365 days a year.    Providers - Choose \"CONTINUE HOME MED (no script)\" at discharge if patient treatment with home infusion will continue.       fentaNYL (DURAGESIC) 12 mcg/hr 72 hr patch Place 1 patch onto the skin every 48 hours remove old patch. Release on/after 5/3/18 to start on/after 5/5/18 15 patch 0     fentaNYL (DURAGESIC) 25 mcg/hr 72 hr patch Place 1 patch onto the skin every 48 hours remove old patch.  Release on/after 5/3/18 to start on/after 5/5/18 15 patch 0     levofloxacin (LEVAQUIN) 500 MG tablet Take 1 tablet (500 mg) by mouth daily 7 tablet 0     mupirocin (BACTROBAN) 2 % ointment Apply topically 3 times daily 30 g 3     naloxone (NARCAN) nasal spray Spray 1 spray (4 mg) into one nostril alternating nostrils as needed for opioid reversal (every 2-3 minutes until assistance arrives.) 0.2 mL 0     Needle, Disp, (BD DISP NEEDLES) 27G X 1/2\" MISC 1 Device every 30 days Use for cyanocobalamin injection once q 30 days. 3 each 4     nystatin-triamcinolone (MYCOLOG II) cream Apply topically 2 times daily as needed 60 g 5     ondansetron (ZOFRAN-ODT) 8 MG ODT tab Take 1 tablet (8 mg) by mouth every 8 hours as needed for nausea 90 tablet 3     order for DME Injection Supplies for Vitamin B12: 3cc syringes w/ 27 gauge needles, 1/2 inch length 12 each 0     order for DME Equipment being ordered: Bilateral knee high chronic venous insufficiency stockings--  mild-moderate pressures. 4 each 5     oxyCODONE (ROXICODONE) 5 MG/5ML solution Take 10-15 mLs (10-15 mg) by mouth every 4 hours as " "needed for moderate to severe pain Max of 55 mg/day x 8 days, then max of 45mg/day. Fill on/after 5/10/18 not to start untill 5/12/18. 1430 mL 0     sucralfate (CARAFATE) 1 GM/10ML suspension Take 10 mLs (1 g) by mouth 4 times daily 1200 mL 11     vitamin D (ERGOCALCIFEROL) 84393 UNIT capsule Take 1 capsule (50,000 Units) by mouth every 7 days 12 capsule 3     diphenhydrAMINE (BENADRYL) 12.5 MG/5ML solution Take 10 mLs (25 mg) by mouth 2 times daily (Patient not taking: Reported on 5/3/2018) 480 mL 0     [facility] INFUSION MANAGED PATIENT Contact [facility] for patient specific medication information at [phone] on admission and discharge from the hospital.  Phones are answered 24 hours a day 7 days a week 365 days a year.    Providers - Choose \"CONTINUE HOME MED (no script)\" at discharge if patient treatment with home infusion will continue.       levofloxacin (LEVAQUIN) 500 MG tablet Take 1 tablet (500 mg) by mouth daily (Patient not taking: Reported on 4/4/2018) 10 tablet 0     lidocaine (LIDODERM) 5 % Patch Place 1-2 patches onto the skin every 24 hours Wear for 12 hours, remove for 12 hours.  OK to cut to better fit to size. (Patient not taking: Reported on 5/3/2018) 60 patch 6     lidocaine-prilocaine (EMLA) cream Apply topically as needed for moderate pain (Patient not taking: Reported on 5/3/2018) 30 g 11     morphine 0.1% in intrasite topical gel Apply as needed prior to accessing the port site. (Patient not taking: Reported on 4/4/2018) 100 g 0     Multiple Vitamin (MULTI-VITAMINS) TABS Take 1 tablet by mouth       predniSONE (DELTASONE) 10 MG tablet Daily tapering dose:  20/d x 3 days, 10/d x 3 days (Patient not taking: Reported on 5/3/2018) 9 tablet 0     [DISCONTINUED] albuterol (PROAIR HFA/PROVENTIL HFA/VENTOLIN HFA) 108 (90 BASE) MCG/ACT Inhaler Inhale 2 puffs into the lungs every 4 hours as needed for shortness of breath / dyspnea or wheezing 1 Inhaler 3     [DISCONTINUED] NO " "ACTIVE MEDICATIONS . 0 0       ROS:  CONSTITUTIONAL: Relatively stable weight with total parenteral nutrition.  Transient fever as noted above  INTEGUMENTARY/SKIN: NEGATIVE for worrisome rashes, moles or lesions  EYES: NEGATIVE for vision changes or irritation  ENT/MOUTH: NEGATIVE for ear, mouth and throat problems  RESP:as above  CV: NEGATIVE for chest pain, palpitations or peripheral edema  GI: Chronic abdominal pain post complications from gastric bypass surgery.  Patient does not have a functioning stomach.  He has a G-tube to vent his GI tract in the left upper quadrant.  No major changes noted.  : NEGATIVE for frequency, dysuria, or hematuria  MUSCULOSKELETAL: NEGATIVE for significant arthralgias or myalgia  NEURO: NEGATIVE for weakness, dizziness or paresthesias  ENDOCRINE: NEGATIVE for temperature intolerance, skin/hair changes  HEME: NEGATIVE for bleeding problems  PSYCHIATRIC: NEGATIVE for changes in mood or affect  PSYCHIATRIC: On ADHD medication.  Off all benzodiazepines.    OBJECTIVE:                                                    /78  Pulse 80  Temp 98.7  F (37.1  C) (Temporal)  Resp 18  Ht 5' 11\" (1.803 m)  Wt 198 lb (89.8 kg)  SpO2 98%  BMI 27.62 kg/m2  Body mass index is 27.62 kg/(m^2).  GENERAL: alert, no distress and cooperative  EYES: Eyes grossly normal to inspection, extraocular movements - intact, and PERRL  HENT: ear canals- normal; TMs- normal; Nose- normal; Mouth- no ulcers, no lesions  NECK: no tenderness, no adenopathy, no asymmetry, no masses, no stiffness; thyroid- normal to palpation  RESP: lungs clear to auscultation - no rales, no rhonchi, no wheezes  RESP: Stable port in his right upper chest  CV: regular rates and rhythm, normal S1 S2, no S3 or S4 and no murmur, no click or rub -  ABDOMEN: Gastrostomy vent in his right left upper quadrant.  No distention.  Generalized slight tenderness.  Multiple incisions well-healed.  MS: extremities- no gross deformities " noted, no edema  SKIN: no suspicious lesions, no rashes  NEURO: strength and tone- normal, sensory exam- grossly normal, mentation- intact, speech- normal, reflexes- symmetric  BACK: no CVA tenderness, no paralumbar tenderness  PSYCH: Alert and oriented times 3; speech- coherent , normal rate and volume; able to articulate logical thoughts, able to abstract reason, no tangential thoughts, no hallucinations or delusions, affect- normal  LYMPHATICS: ant. cervical- normal, post. cervical- normal, axillary- normal, supraclavicular- normal, inguinal- normal    Diagnostic test results:  Diagnostic Test Results:  Xr Chest 2 Views    Result Date: 5/3/2018  CHEST TWO VIEWS 5/3/2018 12:05 PM HISTORY: Prior right lung infiltrate now with productive cough and fever. Productive cough. Fever and chills. COMPARISON: 4/26/2018     IMPRESSION: Right-sided chest port remains in place with catheter tip in the SVC. The heart size is normal. The previously seen right basilar infiltrate has nearly resolved. No new pulmonary opacities are seen. There is no pleural effusion. ROSEMARY EASON MD    Xr Chest 2 Views    Result Date: 4/26/2018  XR CHEST 2 VW  4/26/2018 12:57 AM INDICATION: Pleuritic chest pain. COMPARISON: 4/26/2018.     IMPRESSION: Slight interstitial prominence in the right lower lung, decreased from prior study. No new consolidative infiltrates or other acute findings. Normal heart size. Small esophageal hiatal hernia. Central line tip in SVC. LINDSEY SOLIS MD       ASSESSMENT/PLAN:                                                    1. Productive cough  Suspect a recurring bronchitis.  With his transient fever and purulent mucus, 1 week treatment with oral Levaquin.  Patient has multiple allergies to other medications.  Inhaler to be used as needed.  - levofloxacin (LEVAQUIN) 500 MG tablet; Take 1 tablet (500 mg) by mouth daily  Dispense: 7 tablet; Refill: 0  - XR Chest 2 Views; Future  - albuterol (PROAIR HFA/PROVENTIL  HFA/VENTOLIN HFA) 108 (90 Base) MCG/ACT Inhaler; Inhale 2 puffs into the lungs every 4 hours as needed for shortness of breath / dyspnea or wheezing  Dispense: 1 Inhaler; Refill: 3    2. Fever and chills  Suspect secondary to a respiratory infection.  - levofloxacin (LEVAQUIN) 500 MG tablet; Take 1 tablet (500 mg) by mouth daily  Dispense: 7 tablet; Refill: 0  - XR Chest 2 Views; Future    3. ADHD (attention deficit hyperactivity disorder), inattentive type  Reduce medication to 120 mg tablet daily.  - amphetamine-dextroamphetamine (ADDERALL) 20 MG per tablet; Take 1 tablet (20 mg) by mouth daily  Dispense: 30 tablet; Refill: 0  - amphetamine-dextroamphetamine (ADDERALL) 20 MG per tablet; Take 1 tablet (20 mg) by mouth daily  Dispense: 30 tablet; Refill: 0  - amphetamine-dextroamphetamine (ADDERALL) 20 MG per tablet; Take 1 tablet (20 mg) by mouth daily  Dispense: 30 tablet; Refill: 0    4. Gastric bypass status for obesity  Source of chronic pain and need for total parenteral nutrition due to complications.      Follow up with Provider -Follow-up in 3 months or as needed.  See Patient Instructions    The patient understood the rational for the diagnosis and treatment plan. All questions were answered to best of my ability and the patient's satisfaction.    Note: Chart documentation done in part with Dragon Voice Recognition software. Although reviewed after completion, some word and grammatical errors may remain.  Please consider this when interpreting information in this chart.      Esteban Daly MD  [facility]  Answers for HPI/ROS submitted by the patient on 5/3/2018   PHQ-2 Score: 0  CIERRA 7 TOTAL SCORE: 1

## 2018-04-27 NOTE — PROGRESS NOTES
This is a recent snapshot of the patient's [facility] medical record.  For current drug dose and complete information and questions, call [phone] or In Basket pool, fv home infusion ([id number])  CSN Number:  [id number]

## 2018-04-27 NOTE — PROGRESS NOTES
Clinic Care Coordination Contact  Care Team Conversations    The Primary Care Coordination RN spoke with the patient as he was seen in the ED for pleurisy.  He states that he is feeling better today although still has the numbness on the tops of his arms.  Today he has a temp of 100 and has not taken any tylenol.  Denies any chills, body aches, nausea or vomiting.  The Primary Care Coordination RN encouraged him to take tylenol and then see if the temp subsides.  Encouraged the patient and his wife to follow the directions that the providers have for them as far as returning to the ED.  There were no other questions at this time.  The assigned care coordinator Melissa Behl BSN, RN will contact the patient as planned before.      Plan:  1).  The patient will take tylenol to treat the low grade temp.  2).  The patient will monitor the temp and other symptoms and return to the ED as instructed by the providers.  3).  The assigned care coordinator will contact the patient as planned before.      Sesar Novak, MSN, RN, PHN I Clinic Care Coordinator  [facility]  Phone: [phone]  [email or web address]

## 2018-04-27 NOTE — TELEPHONE ENCOUNTER
Stoney message from pt on 4/26 at 1825:    Hey I just wanted to let you know that I was seen at [facility] on 4/26/2018 because I was having chest pain and they did test and x rays of my chest and they said I have pleurisy but could go into pneumonia to keep an eye on it plus he told me to use the oxycodone as needed for pain alwise he also prescribed prednisone 10mg tablets quantity 9 I go and see Dr Daly on May 1 2018 any questions feel free to call us at [phone] thanks again  ------------  Message sen to pt:    Esther Canales,     It is good that you were evaluated for the symptoms you were experiencing! Thank you for the update and I hope you start feeling better soon. Dr. Milligan is out of the office until next week and she will review your message when she returns.     Take care,     YADIRA CrookN, RN-BC  Patient Care Supervisor/Care Coordinator  [facility]

## 2018-04-29 ENCOUNTER — MYC MEDICAL ADVICE (OUTPATIENT)
Dept: PALLIATIVE MEDICINE | Facility: CLINIC | Age: 48
End: 2018-04-29

## 2018-04-29 DIAGNOSIS — R10.9 CHRONIC ABDOMINAL PAIN: ICD-10-CM

## 2018-04-29 DIAGNOSIS — G89.29 CHRONIC ABDOMINAL PAIN: ICD-10-CM

## 2018-04-29 DIAGNOSIS — Z79.891 ENCOUNTER FOR LONG-TERM OPIATE ANALGESIC USE: ICD-10-CM

## 2018-04-30 ENCOUNTER — HOME INFUSION (PRE-WILLOW HOME INFUSION) (OUTPATIENT)
Dept: PHARMACY | Facility: CLINIC | Age: 48
End: 2018-04-30

## 2018-04-30 LAB — PAIN DRUG SCR UR W RPTD MEDS: NORMAL

## 2018-04-30 RX ORDER — OXYCODONE HCL 5 MG/5 ML
10-15 SOLUTION, ORAL ORAL EVERY 4 HOURS PRN
Qty: 1430 ML | Refills: 0 | Status: SHIPPED | OUTPATIENT
Start: 2018-04-30 | End: 2018-05-29

## 2018-04-30 NOTE — TELEPHONE ENCOUNTER
Spoke to patient and his wife and they would like Rx mailed to [facility], [place]. [address] [facility] [address]- Mailing from [place] on 05/01/18.

## 2018-04-30 NOTE — TELEPHONE ENCOUNTER
Received call from patient requesting refill(s) of  oxyCODONE (ROXICODONE) 5 MG/5ML solution    Last picked up from pharmacy on 4/13/18    Pt last seen by prescribing provider on 4/4/18  Next appt scheduled for 6/27/18     checked in the past 6 months? Yes If no, print current report and give to RN    Last urine drug screen date 4/24/18  Current opioid agreement on file (completed within the last year) Yes Date of opioid agreement: 12/7/17    Processing (pick one and delete the others):   [facility] clinic    Corky Chatman MA  [facility]    Will route to nursing pool for review and preparation of prescription(s).

## 2018-04-30 NOTE — TELEPHONE ENCOUNTER
Parker,  I'm ok with going up, but if we do the remaining 12 days this month, and the entire month next month-- then that is a long time at a higher dose.  I am going to propose that we do the remaining 12 days of this month, and 8 days of next month, for a total of 20 days at the higher dose.  We will then have you come back down to 45mg/day.  If you have concerns at that time, we can discuss it.    MD [facility]     Signed Prescriptions:                        Disp   Refills    oxyCODONE (ROXICODONE) 5 MG/5ML solution   1430 mL0        Sig: Take 10-15 mLs (10-15 mg) by mouth every 4 hours as           needed for moderate to severe pain Max of 55           mg/day x 8 days, then max of 45mg/day. Fill           on/after 5/10/18 not to start untill 5/12/18.  Authorizing Provider: BRANDYN JENKINS MD [facility]

## 2018-04-30 NOTE — TELEPHONE ENCOUNTER
Stoney message from patient on Sunday 4/29 at 3:30 pm:    I need to order my oxycodone and I would like to go up 10mg more a month this month I had a whole lot of pain more with everything going on any questions feel free to call us thank you so much   -----------------  Will route to MA pool for assistance with gathering opioid refill information. Will route to Dr. Milligan as well re: pt's request for increased medications. See 4/26 encounter for more details about increased pain.     Gabbi Heath, YADIRAN, RN-BC  Patient Care Supervisor/Care Coordinator  [facility]

## 2018-04-30 NOTE — TELEPHONE ENCOUNTER
Medication refill information reviewed.     Due date for oxycodone 5 mg/5 ml is 5/12/18 with dose increase to 55 mg/day as of today (4/20/18)    Prescription prepped for review. Quantity increased to 1650 mls to accommodate increased daily dosing for the next 30 days.     Will route to provider.     YADIRA CrookN, RN-BC  Patient Care Supervisor/Care Coordinator  [facility] [place]

## 2018-04-30 NOTE — TELEPHONE ENCOUNTER
Parker,  I am fine with changing as of today to a max of 55mg per day, but I don't want you to increase the individual doses (no more than the 10-15mg of oxycodone per dose).    As you are exactly nursing home through your script, this means that your remaining medication will last just over 12 days, rather than the 15 days we previously planned for.  This means your next oxycodone would be due 5/12.     Would you be fine dropping back to 45mg/day with the next script on 5/12?    Jessica Milligan MD  [facility]

## 2018-05-01 NOTE — PROGRESS NOTES
This is a recent snapshot of the patient's [facility] medical record.  For current drug dose and complete information and questions, call [phone] or In Basket pool, fv home infusion ([id number])  CSN Number:  [id number]

## 2018-05-03 ENCOUNTER — OFFICE VISIT (OUTPATIENT)
Dept: FAMILY MEDICINE | Facility: CLINIC | Age: 48
End: 2018-05-03
Payer: COMMERCIAL

## 2018-05-03 ENCOUNTER — RADIANT APPOINTMENT (OUTPATIENT)
Dept: GENERAL RADIOLOGY | Facility: CLINIC | Age: 48
End: 2018-05-03
Attending: FAMILY MEDICINE
Payer: COMMERCIAL

## 2018-05-03 ENCOUNTER — MYC MEDICAL ADVICE (OUTPATIENT)
Dept: PALLIATIVE MEDICINE | Facility: CLINIC | Age: 48
End: 2018-05-03

## 2018-05-03 VITALS
TEMPERATURE: 98.7 F | HEART RATE: 80 BPM | OXYGEN SATURATION: 98 % | WEIGHT: 198 LBS | RESPIRATION RATE: 18 BRPM | SYSTOLIC BLOOD PRESSURE: 124 MMHG | BODY MASS INDEX: 27.72 KG/M2 | HEIGHT: 71 IN | DIASTOLIC BLOOD PRESSURE: 78 MMHG

## 2018-05-03 DIAGNOSIS — R05.8 PRODUCTIVE COUGH: ICD-10-CM

## 2018-05-03 DIAGNOSIS — R05.8 PRODUCTIVE COUGH: Primary | ICD-10-CM

## 2018-05-03 DIAGNOSIS — R50.9 FEVER AND CHILLS: ICD-10-CM

## 2018-05-03 DIAGNOSIS — Z98.84 GASTRIC BYPASS STATUS FOR OBESITY: ICD-10-CM

## 2018-05-03 DIAGNOSIS — F90.0 ADHD (ATTENTION DEFICIT HYPERACTIVITY DISORDER), INATTENTIVE TYPE: ICD-10-CM

## 2018-05-03 PROCEDURE — 99214 OFFICE O/P EST MOD 30 MIN: CPT | Performed by: FAMILY MEDICINE

## 2018-05-03 PROCEDURE — 71046 X-RAY EXAM CHEST 2 VIEWS: CPT | Mod: FY

## 2018-05-03 RX ORDER — DEXTROAMPHETAMINE SACCHARATE, AMPHETAMINE ASPARTATE, DEXTROAMPHETAMINE SULFATE AND AMPHETAMINE SULFATE 5; 5; 5; 5 MG/1; MG/1; MG/1; MG/1
20 TABLET ORAL DAILY
Qty: 30 TABLET | Refills: 0 | Status: SHIPPED | OUTPATIENT
Start: 2018-05-07 | End: 2018-05-03

## 2018-05-03 RX ORDER — DEXTROAMPHETAMINE SACCHARATE, AMPHETAMINE ASPARTATE, DEXTROAMPHETAMINE SULFATE AND AMPHETAMINE SULFATE 5; 5; 5; 5 MG/1; MG/1; MG/1; MG/1
20 TABLET ORAL DAILY
Qty: 30 TABLET | Refills: 0 | Status: SHIPPED | OUTPATIENT
Start: 2018-07-06 | End: 2018-07-30

## 2018-05-03 RX ORDER — DEXTROAMPHETAMINE SACCHARATE, AMPHETAMINE ASPARTATE, DEXTROAMPHETAMINE SULFATE AND AMPHETAMINE SULFATE 5; 5; 5; 5 MG/1; MG/1; MG/1; MG/1
20 TABLET ORAL DAILY
Qty: 30 TABLET | Refills: 0 | Status: ON HOLD | OUTPATIENT
Start: 2018-06-06 | End: 2018-06-06

## 2018-05-03 RX ORDER — LEVOFLOXACIN 500 MG/1
500 TABLET, FILM COATED ORAL DAILY
Qty: 7 TABLET | Refills: 0 | Status: SHIPPED | OUTPATIENT
Start: 2018-05-03 | End: 2018-06-01

## 2018-05-03 RX ORDER — ALBUTEROL SULFATE 90 UG/1
2 AEROSOL, METERED RESPIRATORY (INHALATION) EVERY 4 HOURS PRN
Qty: 1 INHALER | Refills: 3 | Status: SHIPPED | OUTPATIENT
Start: 2018-05-03 | End: 2019-09-26

## 2018-05-03 RX ORDER — DEXTROAMPHETAMINE SACCHARATE, AMPHETAMINE ASPARTATE, DEXTROAMPHETAMINE SULFATE AND AMPHETAMINE SULFATE 5; 5; 5; 5 MG/1; MG/1; MG/1; MG/1
20 TABLET ORAL DAILY
Qty: 30 TABLET | Refills: 0 | Status: ON HOLD | OUTPATIENT
Start: 2018-05-07 | End: 2018-06-06

## 2018-05-03 RX ORDER — DEXTROAMPHETAMINE SACCHARATE, AMPHETAMINE ASPARTATE, DEXTROAMPHETAMINE SULFATE AND AMPHETAMINE SULFATE 5; 5; 5; 5 MG/1; MG/1; MG/1; MG/1
20 TABLET ORAL DAILY
Qty: 30 TABLET | Refills: 0 | Status: SHIPPED | OUTPATIENT
Start: 2018-05-03 | End: 2018-05-03

## 2018-05-03 RX ORDER — DEXTROAMPHETAMINE SACCHARATE, AMPHETAMINE ASPARTATE, DEXTROAMPHETAMINE SULFATE AND AMPHETAMINE SULFATE 5; 5; 5; 5 MG/1; MG/1; MG/1; MG/1
20 TABLET ORAL DAILY
Qty: 30 TABLET | Refills: 0 | Status: SHIPPED | OUTPATIENT
Start: 2018-06-06 | End: 2018-05-03

## 2018-05-03 ASSESSMENT — ANXIETY QUESTIONNAIRES
7. FEELING AFRAID AS IF SOMETHING AWFUL MIGHT HAPPEN: NOT AT ALL
GAD7 TOTAL SCORE: 1
5. BEING SO RESTLESS THAT IT IS HARD TO SIT STILL: NOT AT ALL
3. WORRYING TOO MUCH ABOUT DIFFERENT THINGS: NOT AT ALL
6. BECOMING EASILY ANNOYED OR IRRITABLE: NOT AT ALL
2. NOT BEING ABLE TO STOP OR CONTROL WORRYING: NOT AT ALL
1. FEELING NERVOUS, ANXIOUS, OR ON EDGE: NOT AT ALL
GAD7 TOTAL SCORE: 1
4. TROUBLE RELAXING: SEVERAL DAYS
7. FEELING AFRAID AS IF SOMETHING AWFUL MIGHT HAPPEN: NOT AT ALL
GAD7 TOTAL SCORE: 1

## 2018-05-03 ASSESSMENT — PAIN SCALES - GENERAL: PAINLEVEL: NO PAIN (0)

## 2018-05-03 NOTE — MR AVS SNAPSHOT
After Visit Summary   5/3/2018    Parker Acevedo     MRN: [id number]           Patient Information     Date Of Birth          1970        Visit Information        Provider Department      5/3/2018 11:20 AM Esteban Daly MD [facility]        Today's Diagnoses     Productive cough    -  1    Fever and chills        ADHD (attention deficit hyperactivity disorder), inattentive type           Follow-ups after your visit        Follow-up notes from your care team     Return in about 3 months (around 8/3/2018).      Your next 10 appointments already scheduled     Jun 21, 2018 11:20 AM CDT   (Arrive by 11:05 AM)   RETURN BARIATRIC SURGERY with Francis Vyas MD   [facility] ([facility])    [address] [address] [phone]            Jun 27, 2018  1:00 PM CDT   Return Visit with Patti Milligan MD   [facility] ([facility])    [address] [facility] [address] [phone]              Who to contact     If you have questions or need follow up information about today's clinic visit or your schedule please contact [facility] directly at [phone].  Normal or non-critical lab and imaging results will be communicated to you by MyChart, letter or phone within 4 business days after the clinic has received the results. If you do not hear from us within 7 days, please contact the clinic through MyChart or phone. If you have a critical or abnormal lab result, we will notify you by phone as soon as possible.  Submit refill requests through [email or web address] or call your pharmacy and they will forward the refill request to us. Please allow 3 business days for your refill to be completed.          Additional Information About Your Visit        1CastharShareThe Information     [email or web address] gives you secure access to your electronic health  "record. If you see a primary care provider, you can also send messages to your care team and make appointments. If you have questions, please call your primary care clinic.  If you do not have a primary care provider, please call [phone] and they will assist you.        Care EveryWhere ID     This is your Care EveryWhere ID. This could be used by other organizations to access your [facility] medical records  [id number]        Your Vitals Were     Pulse Temperature Respirations Height Pulse Oximetry BMI (Body Mass Index)    80 98.7  F (37.1  C) (Temporal) 18 5' 11\" (1.803 m) 98% 27.62 kg/m2       Blood Pressure from Last 3 Encounters:   05/03/18 124/78   04/26/18 136/88   04/04/18 134/76    Weight from Last 3 Encounters:   05/03/18 198 lb (89.8 kg)   04/25/18 176 lb (79.8 kg)   04/04/18 176 lb (79.8 kg)                 Today's Medication Changes          These changes are accurate as of 5/3/18 12:26 PM.  If you have any questions, ask your nurse or doctor.               Start taking these medicines.        Dose/Directions    * amphetamine-dextroamphetamine 20 MG per tablet   Commonly known as:  ADDERALL   Used for:  ADHD (attention deficit hyperactivity disorder), inattentive type   Started by:  Esteban Daly MD        Dose:  20 mg   Start taking on:  5/7/2018   Take 1 tablet (20 mg) by mouth daily   Quantity:  30 tablet   Refills:  0       * amphetamine-dextroamphetamine 20 MG per tablet   Commonly known as:  ADDERALL   Used for:  ADHD (attention deficit hyperactivity disorder), inattentive type   Started by:  Esteban Daly MD        Dose:  20 mg   Start taking on:  6/6/2018   Take 1 tablet (20 mg) by mouth daily   Quantity:  30 tablet   Refills:  0       * amphetamine-dextroamphetamine 20 MG per tablet   Commonly known as:  ADDERALL   Used for:  ADHD (attention deficit hyperactivity disorder), inattentive type   Started by:  Esteban Daly MD        Dose:  20 mg   Start taking on:  " 7/6/2018   Take 1 tablet (20 mg) by mouth daily   Quantity:  30 tablet   Refills:  0       * Notice:  This list has 3 medication(s) that are the same as other medications prescribed for you. Read the directions carefully, and ask your doctor or other care provider to review them with you.      These medicines have changed or have updated prescriptions.        Dose/Directions    * levofloxacin 500 MG tablet   Commonly known as:  LEVAQUIN   This may have changed:  Another medication with the same name was added. Make sure you understand how and when to take each.   Used for:  Cellulitis, unspecified cellulitis site   Changed by:  Esteban Daly MD        Dose:  500 mg   Take 1 tablet (500 mg) by mouth daily   Quantity:  10 tablet   Refills:  0       * levofloxacin 500 MG tablet   Commonly known as:  LEVAQUIN   This may have changed:  You were already taking a medication with the same name, and this prescription was added. Make sure you understand how and when to take each.   Used for:  Productive cough, Fever and chills   Changed by:  Esteban Daly MD        Dose:  500 mg   Take 1 tablet (500 mg) by mouth daily   Quantity:  7 tablet   Refills:  0       * Notice:  This list has 2 medication(s) that are the same as other medications prescribed for you. Read the directions carefully, and ask your doctor or other care provider to review them with you.         Where to get your medicines      These medications were sent to [facility] [address]  [address] [facility] [address]     Phone:  [phone]     albuterol 108 (90 Base) MCG/ACT Inhaler    levofloxacin 500 MG tablet         Some of these will need a paper prescription and others can be bought over the counter.  Ask your nurse if you have questions.     Bring a paper prescription for each of these medications     amphetamine-dextroamphetamine 20 MG per tablet    amphetamine-dextroamphetamine 20 MG per tablet     amphetamine-dextroamphetamine 20 MG per tablet                Primary Care Provider Office Phone # Fax #    Esteban Daly [phone] [address] [facility] [address]        Equal Access to Services     KATHRYN GUZMAN : Hadii aad ku hadmeaghano Soomaali, waaxda luqadaha, qaybta kaalmada adeegyada, carmela hoguen karlamanolo chua tim rizvi. So [facility] [place] [phone].    ATENCIÓN: Si habla español, tiene a hicks disposición servicios gratuitos de asistencia lingüística. [facility] [phone].    We comply with applicable federal civil rights laws and [place] laws. We do not discriminate on the basis of race, color, national origin, age, disability, sex, sexual orientation, or gender identity.            Thank you!     Thank you for choosing [facility]  for your care. Our goal is always to provide you with excellent care. Hearing back from our patients is one way we can continue to improve our services. Please take a few minutes to complete the written survey that you may receive in the mail after your visit with us. Thank you!             Your Updated Medication List - Protect others around you: Learn how to safely use, store and throw away your medicines at www.disposemymeds.org.          This list is accurate as of 5/3/18 12:26 PM.  Always use your most recent med list.                   Brand Name Dispense Instructions for use Diagnosis    albuterol 108 (90 Base) MCG/ACT Inhaler    PROAIR HFA/PROVENTIL HFA/VENTOLIN HFA    1 Inhaler    Inhale 2 puffs into the lungs every 4 hours as needed for shortness of breath / dyspnea or wheezing    Productive cough       * amphetamine-dextroamphetamine 20 MG per tablet   Start taking on:  5/7/2018    ADDERALL    30 tablet    Take 1 tablet (20 mg) by mouth daily    ADHD (attention deficit hyperactivity disorder), inattentive type       * amphetamine-dextroamphetamine 20 MG per tablet   Start taking on:  6/6/2018    ADDERALL    30 tablet     "Take 1 tablet (20 mg) by mouth daily    ADHD (attention deficit hyperactivity disorder), inattentive type       * amphetamine-dextroamphetamine 20 MG per tablet   Start taking on:  7/6/2018    ADDERALL    30 tablet    Take 1 tablet (20 mg) by mouth daily    ADHD (attention deficit hyperactivity disorder), inattentive type       COREG PO      Take 12.5 mg by mouth 2 times daily        * cyanocobalamin 1000 MCG/ML injection    VITAMIN B12     Inject 1 mL into the muscle every 30 days        * cyanocobalamin 1000 MCG/ML injection    VITAMIN B12    1 mL    Inject 1 mL (1,000 mcg) into the muscle every 30 days    Bariatric surgery status       diphenhydrAMINE 12.5 MG/5ML solution    BENADRYL    480 mL    Take 10 mLs (25 mg) by mouth 2 times daily    Itching, Rash       * [facility] PATIENT      Contact [facility] for patient specific medication information at [phone] on admission and discharge from the hospital.  Phones are answered 24 hours a day 7 days a week 365 days a year.  Providers - Choose \"CONTINUE HOME MED (no script)\" at discharge if patient treatment with home infusion will continue.    S/P bariatric surgery       * [facility]      Contact [facility] for patient specific medication information at [phone] on admission and discharge from the hospital.  Phones are answered 24 hours a day 7 days a week 365 days a year.  Providers - Choose \"CONTINUE HOME MED (no script)\" at discharge if patient treatment with home infusion will continue.    Severe malnutrition (H)       * fentaNYL 25 mcg/hr 72 hr patch    DURAGESIC    15 patch    Place 1 patch onto the skin every 48 hours remove old patch.  Release on/after 5/3/18 to start on/after 5/5/18    Chronic abdominal pain, Encounter for long-term opiate analgesic use       * fentaNYL 12 mcg/hr 72 hr patch    DURAGESIC    15 patch    Place 1 patch onto the skin every 48 hours remove old " "patch. Release on/after 5/3/18 to start on/after 5/5/18    Chronic abdominal pain, Encounter for long-term opiate analgesic use       * levofloxacin 500 MG tablet    LEVAQUIN    10 tablet    Take 1 tablet (500 mg) by mouth daily    Cellulitis, unspecified cellulitis site       * levofloxacin 500 MG tablet    LEVAQUIN    7 tablet    Take 1 tablet (500 mg) by mouth daily    Productive cough, Fever and chills       lidocaine 5 % Patch    LIDODERM    60 patch    Place 1-2 patches onto the skin every 24 hours Wear for 12 hours, remove for 12 hours.  OK to cut to better fit to size.    Chronic bilateral low back pain without sciatica, Chronic abdominal pain, Myofascial pain       lidocaine-prilocaine cream    EMLA    30 g    Apply topically as needed for moderate pain    Pain following surgery or procedure       morphine 0.1% in intrasite topical gel     100 g    Apply as needed prior to accessing the port site.    Pain following surgery or procedure       MULTI-VITAMINS Tabs      Take 1 tablet by mouth        mupirocin 2 % ointment    BACTROBAN    30 g    Apply topically 3 times daily    Skin infection       naloxone nasal spray    NARCAN    0.2 mL    Spray 1 spray (4 mg) into one nostril alternating nostrils as needed for opioid reversal (every 2-3 minutes until assistance arrives.)    Encounter for long-term opiate analgesic use, Chronic abdominal pain       Needle (Disp) 27G X 1/2\" Misc    BD DISP NEEDLES    3 each    1 Device every 30 days Use for cyanocobalamin injection once q 30 days.    Bariatric surgery status       nystatin-triamcinolone cream    MYCOLOG II    60 g    Apply topically 2 times daily as needed    Skin irritation       ondansetron 8 MG ODT tab    ZOFRAN-ODT    90 tablet    Take 1 tablet (8 mg) by mouth every 8 hours as needed for nausea    Nausea       * order for DME     12 each    Injection Supplies for Vitamin B12: 3cc syringes w/ 27 gauge needles, 1/2 inch length    Bariatric surgery status    "    * order for DME     4 each    Equipment being ordered: Bilateral knee high chronic venous insufficiency stockings--  mild-moderate pressures.    Bilateral edema of lower extremity       oxyCODONE 5 MG/5ML solution    ROXICODONE    1430 mL    Take 10-15 mLs (10-15 mg) by mouth every 4 hours as needed for moderate to severe pain Max of 55 mg/day x 8 days, then max of 45mg/day. Fill on/after 5/10/18 not to start untill 5/12/18.    Encounter for long-term opiate analgesic use, Chronic abdominal pain       predniSONE 10 MG tablet    DELTASONE    9 tablet    Daily tapering dose:  20/d x 3 days, 10/d x 3 days    Pleurisy       sucralfate 1 GM/10ML suspension    CARAFATE    1200 mL    Take 10 mLs (1 g) by mouth 4 times daily    Nausea       vitamin D 39121 UNIT capsule    ERGOCALCIFEROL    12 capsule    Take 1 capsule (50,000 Units) by mouth every 7 days    Vitamin D deficiency       * Notice:  This list has 13 medication(s) that are the same as other medications prescribed for you. Read the directions carefully, and ask your doctor or other care provider to review them with you.

## 2018-05-03 NOTE — PROGRESS NOTES
This is a recent snapshot of the patient's [facility] medical record.  For current drug dose and complete information and questions, call [phone] or In Basket pool, [facility] home infusion ([id number])  CSN Number:  [id number]

## 2018-05-04 ENCOUNTER — HOME INFUSION (PRE-WILLOW HOME INFUSION) (OUTPATIENT)
Dept: PHARMACY | Facility: CLINIC | Age: 48
End: 2018-05-04

## 2018-05-04 ASSESSMENT — ANXIETY QUESTIONNAIRES: GAD7 TOTAL SCORE: 1

## 2018-05-08 ENCOUNTER — HOME INFUSION (PRE-WILLOW HOME INFUSION) (OUTPATIENT)
Dept: PHARMACY | Facility: CLINIC | Age: 48
End: 2018-05-08

## 2018-05-08 NOTE — TELEPHONE ENCOUNTER
Prior Authorization Retail Medication Request    Medication/Dose: fentaNYL (DURAGESIC) 12 mcg/hr 72 hr patch   AND fentaNYL (DURAGESIC) 25 mcg/hr 72 hr patch  ICD code (if different than what is on RX):  Encounter for long-term opiate analgesic use [Z79.891 And Chronic abdominal pain [R10.9, G89.29  Previously Tried and Failed:    Rationale:      Insurance Name:  [employer]  Insurance ID:        Pharmacy Information (if different than what is on RX)    [facility] [place], [place] - [address] [place] [id number]  Phone: [phone] Fax: [phone]

## 2018-05-08 NOTE — TELEPHONE ENCOUNTER
Tried to call pt to inform him about the PA but pt's phone numbers are inactive.     Will route to Dr. Milligan for EUSEBIA Cahtman MA  [facility]

## 2018-05-08 NOTE — TELEPHONE ENCOUNTER
Called INS - they stated both PAs are approved for both strengths  I had the INS rep - run a test claim - she stated both will fill on 5/23/18 for #15 for 30 days  I called and relayed the message to the pharmacy - and gave them my number if they have any other issues with it

## 2018-05-08 NOTE — PROGRESS NOTES
This is a recent snapshot of the patient's [facility] medical record.  For current drug dose and complete information and questions, call [phone] or In Basket pool, fv home infusion ([id number])  CSN Number:  [id number]

## 2018-05-10 NOTE — PROGRESS NOTES
This is a recent snapshot of the patient's [facility] medical record.  For current drug dose and complete information and questions, call [phone] or In Basket pool, fv home infusion ([id number])  CSN Number:  [id number]

## 2018-05-15 ENCOUNTER — HOME INFUSION (PRE-WILLOW HOME INFUSION) (OUTPATIENT)
Dept: PHARMACY | Facility: CLINIC | Age: 48
End: 2018-05-15

## 2018-05-16 NOTE — PROGRESS NOTES
This is a recent snapshot of the patient's [facility] medical record.  For current drug dose and complete information and questions, call [phone] or In Basket pool, fv home infusion ([id number])  CSN Number:  [id number]

## 2018-05-17 ENCOUNTER — MYC MEDICAL ADVICE (OUTPATIENT)
Dept: PALLIATIVE MEDICINE | Facility: CLINIC | Age: 48
End: 2018-05-17

## 2018-05-17 DIAGNOSIS — Z79.891 ENCOUNTER FOR LONG-TERM OPIATE ANALGESIC USE: ICD-10-CM

## 2018-05-17 DIAGNOSIS — G89.29 CHRONIC ABDOMINAL PAIN: ICD-10-CM

## 2018-05-17 DIAGNOSIS — R10.9 CHRONIC ABDOMINAL PAIN: ICD-10-CM

## 2018-05-17 RX ORDER — FENTANYL 25 UG/1
1 PATCH TRANSDERMAL
Qty: 15 PATCH | Refills: 0 | Status: SHIPPED | OUTPATIENT
Start: 2018-05-17 | End: 2018-05-31

## 2018-05-17 RX ORDER — FENTANYL 12.5 UG/1
1 PATCH TRANSDERMAL
Qty: 15 PATCH | Refills: 0 | Status: SHIPPED | OUTPATIENT
Start: 2018-05-17 | End: 2018-05-31

## 2018-05-17 NOTE — TELEPHONE ENCOUNTER
Patient requesting refill(s) of fentaNYL (DURAGESIC) 12 mcg/hr 72 hr patch  Last picked up from pharmacy on 5/3/18 for a 20 days supply. Insurance allowing 10 patches.     fentaNYL (DURAGESIC) 25 mcg/hr 72 hr patch  Last picked up from pharmacy on 05/03/18 for 20 days supply.     Pt last seen by prescribing provider on 4/4/18  Next appt scheduled for 6/27/18     checked in the past 6 months? Yes If no, print current report and give to RN    Last urine drug screen date 4/25/18  Current opioid agreement on file (completed within the last year) Yes Date of opioid agreement: 12/7/17    Processing (pick one)     Pt will  in clinic at [place] location    Will route to nursing pool for review and preparation of prescription(s).

## 2018-05-17 NOTE — TELEPHONE ENCOUNTER
Signed Prescriptions:                        Disp   Refills    fentaNYL (DURAGESIC) 12 mcg/hr 72 hr patch 15 pat*0        Sig: Place 1 patch onto the skin every 48 hours remove old           patch. Release on/after 5/23/18 to start on/after           5/25/18  Authorizing Provider: BRANDYN JENKINS    fentaNYL (DURAGESIC) 25 mcg/hr 72 hr patch 15 pat*0        Sig: Place 1 patch onto the skin every 48 hours remove old           patch.  Release on/after 5/23/18 to start           on/after 5/25/18  Authorizing Provider: BRANDYN JENKINS MD  [facility]

## 2018-05-17 NOTE — TELEPHONE ENCOUNTER
Medication refill information reviewed.     Due date for fentaNYL (DURAGESIC) 12 mcg/hr 72 hr patch     fentaNYL (DURAGESIC) 25 mcg/hr 72 hr patch is 5/20/18     Prescriptions prepped for review.     Will route to provider.

## 2018-05-17 NOTE — TELEPHONE ENCOUNTER
Signed script kept in file folder for patient to pick at [facility] 2nd floor. Patient was notified via MyChart.     Lyndsay Solo MA

## 2018-05-20 ENCOUNTER — MYC MEDICAL ADVICE (OUTPATIENT)
Dept: PALLIATIVE MEDICINE | Facility: CLINIC | Age: 48
End: 2018-05-20

## 2018-05-21 ENCOUNTER — HOME INFUSION (PRE-WILLOW HOME INFUSION) (OUTPATIENT)
Dept: PHARMACY | Facility: CLINIC | Age: 48
End: 2018-05-21

## 2018-05-22 NOTE — PROGRESS NOTES
This is a recent snapshot of the patient's [facility] medical record.  For current drug dose and complete information and questions, call [phone] or In Basket pool, fv home infusion ([id number])  CSN Number:  [id number]

## 2018-05-22 NOTE — TELEPHONE ENCOUNTER
Last read by Parker Acevedo Sr at 11:34 AM on 5/22/2018.     YADIRA LazarN, RN  Care Coordinator  [facility]

## 2018-05-23 ENCOUNTER — HOME INFUSION (PRE-WILLOW HOME INFUSION) (OUTPATIENT)
Dept: PHARMACY | Facility: CLINIC | Age: 48
End: 2018-05-23

## 2018-05-23 ENCOUNTER — HOSPITAL ENCOUNTER (OUTPATIENT)
Dept: LAB | Facility: CLINIC | Age: 48
Discharge: HOME | End: 2018-05-23
Attending: SURGERY | Admitting: SURGERY
Payer: COMMERCIAL

## 2018-05-23 LAB
ALBUMIN SERPL-MCNC: 3.3 G/DL (ref 3.4–5)
ALP SERPL-CCNC: 81 U/L (ref 40–150)
ALT SERPL W P-5'-P-CCNC: 26 U/L (ref 0–70)
ANION GAP SERPL CALCULATED.3IONS-SCNC: 6 MMOL/L (ref 3–14)
AST SERPL W P-5'-P-CCNC: 12 U/L (ref 0–45)
BASOPHILS # BLD AUTO: 0 10E9/L (ref 0–0.2)
BASOPHILS NFR BLD AUTO: 0.4 %
BILIRUB DIRECT SERPL-MCNC: 0.1 MG/DL (ref 0–0.2)
BILIRUB SERPL-MCNC: 0.5 MG/DL (ref 0.2–1.3)
BUN SERPL-MCNC: 9 MG/DL (ref 7–30)
CALCIUM SERPL-MCNC: 8 MG/DL (ref 8.5–10.1)
CHLORIDE SERPL-SCNC: 109 MMOL/L (ref 94–109)
CO2 SERPL-SCNC: 30 MMOL/L (ref 20–32)
CREAT SERPL-MCNC: 0.67 MG/DL (ref 0.66–1.25)
DIFFERENTIAL METHOD BLD: ABNORMAL
EOSINOPHIL # BLD AUTO: 0.3 10E9/L (ref 0–0.7)
EOSINOPHIL NFR BLD AUTO: 4.9 %
ERYTHROCYTE [DISTWIDTH] IN BLOOD BY AUTOMATED COUNT: 12.9 % (ref 10–15)
GFR SERPL CREATININE-BSD FRML MDRD: >90 ML/MIN/1.7M2
GLUCOSE SERPL-MCNC: 97 MG/DL (ref 70–99)
HCT VFR BLD AUTO: 38.6 % (ref 40–53)
HGB BLD-MCNC: 12.7 G/DL (ref 13.3–17.7)
IMM GRANULOCYTES # BLD: 0 10E9/L (ref 0–0.4)
IMM GRANULOCYTES NFR BLD: 0 %
LYMPHOCYTES # BLD AUTO: 1.6 10E9/L (ref 0.8–5.3)
LYMPHOCYTES NFR BLD AUTO: 29.7 %
MAGNESIUM SERPL-MCNC: 1.9 MG/DL (ref 1.6–2.3)
MCH RBC QN AUTO: 31.8 PG (ref 26.5–33)
MCHC RBC AUTO-ENTMCNC: 32.9 G/DL (ref 31.5–36.5)
MCV RBC AUTO: 97 FL (ref 78–100)
MONOCYTES # BLD AUTO: 0.7 10E9/L (ref 0–1.3)
MONOCYTES NFR BLD AUTO: 12.7 %
NEUTROPHILS # BLD AUTO: 2.8 10E9/L (ref 1.6–8.3)
NEUTROPHILS NFR BLD AUTO: 52.3 %
PHOSPHATE SERPL-MCNC: 3.2 MG/DL (ref 2.5–4.5)
PLATELET # BLD AUTO: 143 10E9/L (ref 150–450)
POTASSIUM SERPL-SCNC: 3.4 MMOL/L (ref 3.4–5.3)
PROT SERPL-MCNC: 6.5 G/DL (ref 6.8–8.8)
RBC # BLD AUTO: 4 10E12/L (ref 4.4–5.9)
SODIUM SERPL-SCNC: 145 MMOL/L (ref 133–144)
TRIGL SERPL-MCNC: 81 MG/DL
WBC # BLD AUTO: 5.4 10E9/L (ref 4–11)

## 2018-05-23 PROCEDURE — 84134 ASSAY OF PREALBUMIN: CPT | Performed by: SURGERY

## 2018-05-23 PROCEDURE — 83735 ASSAY OF MAGNESIUM: CPT | Performed by: SURGERY

## 2018-05-23 PROCEDURE — 84478 ASSAY OF TRIGLYCERIDES: CPT | Performed by: SURGERY

## 2018-05-23 PROCEDURE — 84100 ASSAY OF PHOSPHORUS: CPT | Performed by: SURGERY

## 2018-05-23 PROCEDURE — 85025 COMPLETE CBC W/AUTO DIFF WBC: CPT | Performed by: SURGERY

## 2018-05-23 PROCEDURE — 80053 COMPREHEN METABOLIC PANEL: CPT | Performed by: SURGERY

## 2018-05-23 PROCEDURE — 82248 BILIRUBIN DIRECT: CPT | Performed by: SURGERY

## 2018-05-24 ENCOUNTER — HOME INFUSION (PRE-WILLOW HOME INFUSION) (OUTPATIENT)
Dept: PHARMACY | Facility: CLINIC | Age: 48
End: 2018-05-24

## 2018-05-24 LAB — PREALB SERPL IA-MCNC: 22 MG/DL (ref 15–45)

## 2018-05-24 NOTE — PROGRESS NOTES
This is a recent snapshot of the patient's [facility] medical record.  For current drug dose and complete information and questions, call [phone] or In Basket pool, fv home infusion ([id number])  CSN Number:  [id number]

## 2018-05-26 ENCOUNTER — HOME INFUSION (PRE-WILLOW HOME INFUSION) (OUTPATIENT)
Dept: PHARMACY | Facility: CLINIC | Age: 48
End: 2018-05-26

## 2018-05-28 ENCOUNTER — MYC MEDICAL ADVICE (OUTPATIENT)
Dept: PALLIATIVE MEDICINE | Facility: CLINIC | Age: 48
End: 2018-05-28

## 2018-05-28 DIAGNOSIS — G89.29 CHRONIC ABDOMINAL PAIN: ICD-10-CM

## 2018-05-28 DIAGNOSIS — Z79.891 ENCOUNTER FOR LONG-TERM OPIATE ANALGESIC USE: ICD-10-CM

## 2018-05-28 DIAGNOSIS — R10.9 CHRONIC ABDOMINAL PAIN: ICD-10-CM

## 2018-05-29 NOTE — TELEPHONE ENCOUNTER
Received call from patient requesting refill(s) of oxyCODONE (ROXICODONE) 5 MG/5ML solution    Last picked up from pharmacy on 5/10/18    Pt last seen by prescribing provider on 4/4/18  Next appt scheduled for 6/27/18     checked in the past 6 months? Yes If no, print current report and give to RN    Last urine drug screen date 4/25/18  Current opioid agreement on file (completed within the last year) Yes Date of opioid agreement: 12/7/17    Processing (pick one and delete the others):   [facility] clinic    Corky Chatman MA  [facility]    Will route to nursing pool for review and preparation of prescription(s).

## 2018-05-29 NOTE — TELEPHONE ENCOUNTER
Medication refill information reviewed. Requested early release for travel (1 day early). Dates adjusted to reflect request. Also requesting dose adjustment to 55 MG daily. Notes from last refill indicate short term dose adjustment. Please review. No adjustment to sig or qty have been made outside fill of the date.    Due date for oxyCODONE (ROXICODONE) 5 MG/5ML solution is 6/11/18     Prescriptions prepped for review.     Will route to provider.

## 2018-05-31 ENCOUNTER — HOME INFUSION (PRE-WILLOW HOME INFUSION) (OUTPATIENT)
Dept: PHARMACY | Facility: CLINIC | Age: 48
End: 2018-05-31

## 2018-05-31 PROCEDURE — 87040 BLOOD CULTURE FOR BACTERIA: CPT | Performed by: SURGERY

## 2018-05-31 PROCEDURE — 87800 DETECT AGNT MULT DNA DIREC: CPT | Performed by: SURGERY

## 2018-05-31 PROCEDURE — 87077 CULTURE AEROBIC IDENTIFY: CPT | Performed by: SURGERY

## 2018-05-31 PROCEDURE — 87186 SC STD MICRODIL/AGAR DIL: CPT | Performed by: SURGERY

## 2018-05-31 RX ORDER — FENTANYL 25 UG/1
1 PATCH TRANSDERMAL
Qty: 15 PATCH | Refills: 0 | Status: SHIPPED | OUTPATIENT
Start: 2018-05-31 | End: 2018-07-03

## 2018-05-31 RX ORDER — OXYCODONE HCL 5 MG/5 ML
10-15 SOLUTION, ORAL ORAL EVERY 4 HOURS PRN
Qty: 1650 ML | Refills: 0 | Status: SHIPPED | OUTPATIENT
Start: 2018-05-31 | End: 2018-07-03

## 2018-05-31 RX ORDER — FENTANYL 12.5 UG/1
1 PATCH TRANSDERMAL
Qty: 15 PATCH | Refills: 0 | Status: SHIPPED | OUTPATIENT
Start: 2018-05-31 | End: 2018-07-03

## 2018-05-31 NOTE — PROGRESS NOTES
This is a recent snapshot of the patient's [facility] medical record.  For current drug dose and complete information and questions, call [phone] or In Basket pool, fv home infusion ([id number])  CSN Number:  [id number]

## 2018-05-31 NOTE — TELEPHONE ENCOUNTER
Script for oxycodone and Fentanyl patch was signed. Placed signed script in  bin. Patient was notified via [employer].     Lyndsay Solo MA

## 2018-05-31 NOTE — TELEPHONE ENCOUNTER
Parker (and Rose),  I spoke with Rose on the phone, and here is a summary  The oxycodone script was signed for max of 55 mg/day.  Having said that, if you start going up on the dose now, then it won't work as well when you need it around the time of your G tube procedure.  Also these are high doses and we have already discussed how you are at doses higher than guidelines recommend.  Therefore you should be doing everything you can to try to remain at 45mg per day total.  If you have a bad day, you can go up to 55mg/day, but this should NOT be everyday.    Jessica Jenkins MD  [facility]       I also let her know we would send fentanyl post-dated scripts as well.      Signed Prescriptions:                        Disp   Refills    oxyCODONE (ROXICODONE) 5 MG/5ML solution   1650 mL0        Sig: Take 10-15 mLs (10-15 mg) by mouth every 4 hours as           needed for moderate to severe pain Max of 55           mg/day this month. Wean as able Fill on/after           6/8/18 not to start untill 6/11/18. Early fill           for travel.  Authorizing Provider: BRANDYN JENKINS    fentaNYL (DURAGESIC) 12 mcg/hr 72 hr patch 15 pat*0        Sig: Place 1 patch onto the skin every 48 hours . Remove           old patch.  Fill on/after 6/22 to start on/after           6/24  Authorizing Provider: BRANDYN JENKINS    fentaNYL (DURAGESIC) 25 mcg/hr 72 hr patch 15 pat*0        Sig: Place 1 patch onto the skin every 48 hours remove old           patch.  Release on/after 6/22/18 to start           on/after 6/24/18  Authorizing Provider: BRANDYN JENKINS MD  [facility]

## 2018-06-01 ENCOUNTER — HOSPITAL ENCOUNTER (INPATIENT)
Facility: CLINIC | Age: 48
LOS: 5 days | Discharge: HOME HEALTH | DRG: 314 | End: 2018-06-06
Attending: EMERGENCY MEDICINE | Admitting: INTERNAL MEDICINE
Payer: COMMERCIAL

## 2018-06-01 ENCOUNTER — HOME INFUSION (PRE-WILLOW HOME INFUSION) (OUTPATIENT)
Dept: PHARMACY | Facility: CLINIC | Age: 48
End: 2018-06-01

## 2018-06-01 ENCOUNTER — APPOINTMENT (OUTPATIENT)
Dept: GENERAL RADIOLOGY | Facility: CLINIC | Age: 48
DRG: 314 | End: 2018-06-01
Attending: EMERGENCY MEDICINE
Payer: COMMERCIAL

## 2018-06-01 ENCOUNTER — APPOINTMENT (OUTPATIENT)
Dept: LAB | Facility: CLINIC | Age: 48
End: 2018-06-01
Attending: SURGERY
Payer: COMMERCIAL

## 2018-06-01 ENCOUNTER — TELEPHONE (OUTPATIENT)
Dept: SURGERY | Facility: CLINIC | Age: 48
End: 2018-06-01

## 2018-06-01 DIAGNOSIS — R78.81 BACTEREMIA: ICD-10-CM

## 2018-06-01 DIAGNOSIS — Z98.84 GASTRIC BYPASS STATUS FOR OBESITY: Primary | ICD-10-CM

## 2018-06-01 DIAGNOSIS — R00.1 SINUS BRADYCARDIA: ICD-10-CM

## 2018-06-01 LAB
ALBUMIN SERPL-MCNC: 3.3 G/DL (ref 3.4–5)
ALBUMIN UR-MCNC: NEGATIVE MG/DL
ALP SERPL-CCNC: 86 U/L (ref 40–150)
ALT SERPL W P-5'-P-CCNC: 18 U/L (ref 0–70)
ANION GAP SERPL CALCULATED.3IONS-SCNC: 10 MMOL/L (ref 3–14)
APPEARANCE UR: CLEAR
AST SERPL W P-5'-P-CCNC: 8 U/L (ref 0–45)
BASOPHILS # BLD AUTO: 0 10E9/L (ref 0–0.2)
BASOPHILS NFR BLD AUTO: 0.2 %
BILIRUB DIRECT SERPL-MCNC: <0.1 MG/DL (ref 0–0.2)
BILIRUB SERPL-MCNC: 0.4 MG/DL (ref 0.2–1.3)
BILIRUB UR QL STRIP: NEGATIVE
BUN SERPL-MCNC: 5 MG/DL (ref 7–30)
CA-I BLD-MCNC: 4.4 MG/DL (ref 4.4–5.2)
CALCIUM SERPL-MCNC: 8 MG/DL (ref 8.5–10.1)
CHLORIDE SERPL-SCNC: 109 MMOL/L (ref 94–109)
CK SERPL-CCNC: 52 U/L (ref 30–300)
CO2 SERPL-SCNC: 27 MMOL/L (ref 20–32)
COLOR UR AUTO: ABNORMAL
CREAT SERPL-MCNC: 0.59 MG/DL (ref 0.66–1.25)
CRP SERPL-MCNC: 26 MG/L (ref 0–8)
DIFFERENTIAL METHOD BLD: ABNORMAL
EOSINOPHIL # BLD AUTO: 0.1 10E9/L (ref 0–0.7)
EOSINOPHIL NFR BLD AUTO: 1.3 %
ERYTHROCYTE [DISTWIDTH] IN BLOOD BY AUTOMATED COUNT: 13.1 % (ref 10–15)
GFR SERPL CREATININE-BSD FRML MDRD: >90 ML/MIN/1.7M2
GLUCOSE SERPL-MCNC: 88 MG/DL (ref 70–99)
GLUCOSE UR STRIP-MCNC: NEGATIVE MG/DL
HCT VFR BLD AUTO: 39.4 % (ref 40–53)
HGB BLD-MCNC: 13.2 G/DL (ref 13.3–17.7)
HGB UR QL STRIP: ABNORMAL
IMM GRANULOCYTES # BLD: 0 10E9/L (ref 0–0.4)
IMM GRANULOCYTES NFR BLD: 0.2 %
KETONES UR STRIP-MCNC: NEGATIVE MG/DL
LACTATE BLD-SCNC: 0.9 MMOL/L (ref 0.7–2)
LEUKOCYTE ESTERASE UR QL STRIP: NEGATIVE
LYMPHOCYTES # BLD AUTO: 0.9 10E9/L (ref 0.8–5.3)
LYMPHOCYTES NFR BLD AUTO: 19.5 %
MAGNESIUM SERPL-MCNC: 2 MG/DL (ref 1.6–2.3)
MCH RBC QN AUTO: 31.7 PG (ref 26.5–33)
MCHC RBC AUTO-ENTMCNC: 33.5 G/DL (ref 31.5–36.5)
MCV RBC AUTO: 95 FL (ref 78–100)
MONOCYTES # BLD AUTO: 0.7 10E9/L (ref 0–1.3)
MONOCYTES NFR BLD AUTO: 14 %
NEUTROPHILS # BLD AUTO: 3.1 10E9/L (ref 1.6–8.3)
NEUTROPHILS NFR BLD AUTO: 64.8 %
NITRATE UR QL: NEGATIVE
NRBC # BLD AUTO: 0 10*3/UL
NRBC BLD AUTO-RTO: 0 /100
PH UR STRIP: 8 PH (ref 5–7)
PHOSPHATE SERPL-MCNC: 2.9 MG/DL (ref 2.5–4.5)
PLATELET # BLD AUTO: 124 10E9/L (ref 150–450)
POTASSIUM SERPL-SCNC: 3.5 MMOL/L (ref 3.4–5.3)
PROCALCITONIN SERPL-MCNC: 0.09 NG/ML
PROT SERPL-MCNC: 6.5 G/DL (ref 6.8–8.8)
RBC # BLD AUTO: 4.16 10E12/L (ref 4.4–5.9)
RBC #/AREA URNS AUTO: <1 /HPF (ref 0–2)
SODIUM SERPL-SCNC: 145 MMOL/L (ref 133–144)
SOURCE: ABNORMAL
SP GR UR STRIP: 1.01 (ref 1–1.03)
TROPONIN I SERPL-MCNC: <0.015 UG/L (ref 0–0.04)
UROBILINOGEN UR STRIP-MCNC: NORMAL MG/DL (ref 0–2)
WBC # BLD AUTO: 4.8 10E9/L (ref 4–11)
WBC #/AREA URNS AUTO: <1 /HPF (ref 0–5)

## 2018-06-01 PROCEDURE — 82550 ASSAY OF CK (CPK): CPT | Performed by: EMERGENCY MEDICINE

## 2018-06-01 PROCEDURE — 99285 EMERGENCY DEPT VISIT HI MDM: CPT | Mod: 25

## 2018-06-01 PROCEDURE — 84484 ASSAY OF TROPONIN QUANT: CPT | Performed by: EMERGENCY MEDICINE

## 2018-06-01 PROCEDURE — 87040 BLOOD CULTURE FOR BACTERIA: CPT | Performed by: EMERGENCY MEDICINE

## 2018-06-01 PROCEDURE — 80048 BASIC METABOLIC PNL TOTAL CA: CPT | Performed by: EMERGENCY MEDICINE

## 2018-06-01 PROCEDURE — 82330 ASSAY OF CALCIUM: CPT | Performed by: EMERGENCY MEDICINE

## 2018-06-01 PROCEDURE — 99223 1ST HOSP IP/OBS HIGH 75: CPT | Mod: AI | Performed by: INTERNAL MEDICINE

## 2018-06-01 PROCEDURE — 96361 HYDRATE IV INFUSION ADD-ON: CPT

## 2018-06-01 PROCEDURE — 84145 PROCALCITONIN (PCT): CPT | Performed by: EMERGENCY MEDICINE

## 2018-06-01 PROCEDURE — 25800025 ZZH RX 258: Performed by: PHYSICIAN ASSISTANT

## 2018-06-01 PROCEDURE — 83605 ASSAY OF LACTIC ACID: CPT | Performed by: EMERGENCY MEDICINE

## 2018-06-01 PROCEDURE — 40000556 ZZH STATISTIC PERIPHERAL IV START W US GUIDANCE

## 2018-06-01 PROCEDURE — 81001 URINALYSIS AUTO W/SCOPE: CPT | Performed by: EMERGENCY MEDICINE

## 2018-06-01 PROCEDURE — 87077 CULTURE AEROBIC IDENTIFY: CPT | Performed by: EMERGENCY MEDICINE

## 2018-06-01 PROCEDURE — 99285 EMERGENCY DEPT VISIT HI MDM: CPT | Mod: 25 | Performed by: EMERGENCY MEDICINE

## 2018-06-01 PROCEDURE — 99223 1ST HOSP IP/OBS HIGH 75: CPT | Mod: AI | Performed by: PHYSICIAN ASSISTANT

## 2018-06-01 PROCEDURE — 96374 THER/PROPH/DIAG INJ IV PUSH: CPT

## 2018-06-01 PROCEDURE — 85025 COMPLETE CBC W/AUTO DIFF WBC: CPT | Performed by: EMERGENCY MEDICINE

## 2018-06-01 PROCEDURE — 83735 ASSAY OF MAGNESIUM: CPT | Performed by: EMERGENCY MEDICINE

## 2018-06-01 PROCEDURE — 71046 X-RAY EXAM CHEST 2 VIEWS: CPT

## 2018-06-01 PROCEDURE — 93005 ELECTROCARDIOGRAM TRACING: CPT

## 2018-06-01 PROCEDURE — 25000125 ZZHC RX 250: Performed by: EMERGENCY MEDICINE

## 2018-06-01 PROCEDURE — 80076 HEPATIC FUNCTION PANEL: CPT | Performed by: EMERGENCY MEDICINE

## 2018-06-01 PROCEDURE — 25000128 H RX IP 250 OP 636: Performed by: EMERGENCY MEDICINE

## 2018-06-01 PROCEDURE — 12000001 ZZH R&B MED SURG/OB UMMC

## 2018-06-01 PROCEDURE — 25000132 ZZH RX MED GY IP 250 OP 250 PS 637: Performed by: PHYSICIAN ASSISTANT

## 2018-06-01 PROCEDURE — 84100 ASSAY OF PHOSPHORUS: CPT | Performed by: EMERGENCY MEDICINE

## 2018-06-01 PROCEDURE — 93010 ELECTROCARDIOGRAM REPORT: CPT | Mod: Z6 | Performed by: EMERGENCY MEDICINE

## 2018-06-01 PROCEDURE — 25000132 ZZH RX MED GY IP 250 OP 250 PS 637: Performed by: EMERGENCY MEDICINE

## 2018-06-01 PROCEDURE — 86140 C-REACTIVE PROTEIN: CPT | Performed by: EMERGENCY MEDICINE

## 2018-06-01 RX ORDER — ACETAMINOPHEN 500 MG
1000 TABLET ORAL EVERY 6 HOURS PRN
COMMUNITY
End: 2018-10-10

## 2018-06-01 RX ORDER — OXYCODONE HCL 5 MG/5 ML
15 SOLUTION, ORAL ORAL EVERY 4 HOURS PRN
Status: DISCONTINUED | OUTPATIENT
Start: 2018-06-01 | End: 2018-06-06 | Stop reason: HOSPADM

## 2018-06-01 RX ORDER — HYDROMORPHONE HYDROCHLORIDE 1 MG/ML
1 INJECTION, SOLUTION INTRAMUSCULAR; INTRAVENOUS; SUBCUTANEOUS
Status: COMPLETED | OUTPATIENT
Start: 2018-06-01 | End: 2018-06-01

## 2018-06-01 RX ORDER — ALBUTEROL SULFATE 90 UG/1
2 AEROSOL, METERED RESPIRATORY (INHALATION) EVERY 4 HOURS PRN
Status: DISCONTINUED | OUTPATIENT
Start: 2018-06-01 | End: 2018-06-06 | Stop reason: HOSPADM

## 2018-06-01 RX ORDER — CARVEDILOL 12.5 MG/1
12.5 TABLET ORAL 2 TIMES DAILY WITH MEALS
COMMUNITY
End: 2018-10-10

## 2018-06-01 RX ORDER — DIPHENHYDRAMINE HCL 12.5MG/5ML
25 LIQUID (ML) ORAL EVERY 12 HOURS
Status: DISCONTINUED | OUTPATIENT
Start: 2018-06-02 | End: 2018-06-03

## 2018-06-01 RX ORDER — FENTANYL 12.5 UG/1
12 PATCH TRANSDERMAL
Status: DISCONTINUED | OUTPATIENT
Start: 2018-06-01 | End: 2018-06-06 | Stop reason: HOSPADM

## 2018-06-01 RX ORDER — NALOXONE HYDROCHLORIDE 0.4 MG/ML
.1-.4 INJECTION, SOLUTION INTRAMUSCULAR; INTRAVENOUS; SUBCUTANEOUS
Status: DISCONTINUED | OUTPATIENT
Start: 2018-06-01 | End: 2018-06-06 | Stop reason: HOSPADM

## 2018-06-01 RX ORDER — ERGOCALCIFEROL 1.25 MG/1
50000 CAPSULE, LIQUID FILLED ORAL
Status: DISCONTINUED | OUTPATIENT
Start: 2018-06-03 | End: 2018-06-06 | Stop reason: HOSPADM

## 2018-06-01 RX ORDER — ACETAMINOPHEN 500 MG
1000 TABLET ORAL EVERY 6 HOURS PRN
Status: DISCONTINUED | OUTPATIENT
Start: 2018-06-01 | End: 2018-06-06

## 2018-06-01 RX ORDER — ONDANSETRON 2 MG/ML
4 INJECTION INTRAMUSCULAR; INTRAVENOUS ONCE
Status: COMPLETED | OUTPATIENT
Start: 2018-06-01 | End: 2018-06-01

## 2018-06-01 RX ORDER — CARVEDILOL 12.5 MG/1
12.5 TABLET ORAL 2 TIMES DAILY WITH MEALS
Status: DISCONTINUED | OUTPATIENT
Start: 2018-06-02 | End: 2018-06-01

## 2018-06-01 RX ORDER — FENTANYL 25 UG/1
25 PATCH TRANSDERMAL
Status: DISCONTINUED | OUTPATIENT
Start: 2018-06-01 | End: 2018-06-06 | Stop reason: HOSPADM

## 2018-06-01 RX ORDER — ONDANSETRON 2 MG/ML
4 INJECTION INTRAMUSCULAR; INTRAVENOUS EVERY 6 HOURS PRN
Status: DISCONTINUED | OUTPATIENT
Start: 2018-06-01 | End: 2018-06-06 | Stop reason: HOSPADM

## 2018-06-01 RX ORDER — SUCRALFATE ORAL 1 G/10ML
1 SUSPENSION ORAL 4 TIMES DAILY
Status: DISCONTINUED | OUTPATIENT
Start: 2018-06-02 | End: 2018-06-06 | Stop reason: HOSPADM

## 2018-06-01 RX ORDER — ONDANSETRON 4 MG/1
4 TABLET, ORALLY DISINTEGRATING ORAL EVERY 6 HOURS PRN
Status: DISCONTINUED | OUTPATIENT
Start: 2018-06-01 | End: 2018-06-06 | Stop reason: HOSPADM

## 2018-06-01 RX ORDER — DIPHENHYDRAMINE HYDROCHLORIDE 12.5 MG/1
12.5 BAR, CHEWABLE ORAL PRN
Status: ON HOLD | COMMUNITY
End: 2018-06-06

## 2018-06-01 RX ORDER — DIPHENHYDRAMINE HCL 25 MG
25 CAPSULE ORAL ONCE
Status: COMPLETED | OUTPATIENT
Start: 2018-06-01 | End: 2018-06-01

## 2018-06-01 RX ORDER — LIDOCAINE 40 MG/G
CREAM TOPICAL
Status: DISCONTINUED | OUTPATIENT
Start: 2018-06-01 | End: 2018-06-06 | Stop reason: HOSPADM

## 2018-06-01 RX ADMIN — DIPHENHYDRAMINE HYDROCHLORIDE 25 MG: 25 CAPSULE ORAL at 23:08

## 2018-06-01 RX ADMIN — FENTANYL 1 PATCH: 12 PATCH TRANSDERMAL at 23:07

## 2018-06-01 RX ADMIN — OXYCODONE HYDROCHLORIDE 15 MG: 5 SOLUTION ORAL at 21:43

## 2018-06-01 RX ADMIN — LIDOCAINE HYDROCHLORIDE: 20 SOLUTION ORAL; TOPICAL at 19:31

## 2018-06-01 RX ADMIN — ONDANSETRON 4 MG: 2 INJECTION INTRAMUSCULAR; INTRAVENOUS at 19:17

## 2018-06-01 RX ADMIN — HYDROMORPHONE HYDROCHLORIDE 1 MG: 1 INJECTION, SOLUTION INTRAMUSCULAR; INTRAVENOUS; SUBCUTANEOUS at 19:18

## 2018-06-01 RX ADMIN — DEXTROSE AND SODIUM CHLORIDE: 5; 450 INJECTION, SOLUTION INTRAVENOUS at 23:06

## 2018-06-01 RX ADMIN — SODIUM CHLORIDE 1000 ML: 9 INJECTION, SOLUTION INTRAVENOUS at 19:17

## 2018-06-01 RX ADMIN — FENTANYL 1 PATCH: 25 PATCH, EXTENDED RELEASE TRANSDERMAL at 23:08

## 2018-06-01 ASSESSMENT — ENCOUNTER SYMPTOMS
DIFFICULTY URINATING: 1
HEADACHES: 1
MYALGIAS: 1
FEVER: 1
WEAKNESS: 1
BACK PAIN: 1
ABDOMINAL PAIN: 1
DYSURIA: 1

## 2018-06-01 ASSESSMENT — ACTIVITIES OF DAILY LIVING (ADL)
SWALLOWING: 0 - SWALLOWS FOODS/LIQUIDS WITHOUT DIFFICULTY
TRANSFERRING: 0-->INDEPENDENT
RETIRED_EATING: 0-->INDEPENDENT
COGNITION: 0 - NO COGNITION ISSUES REPORTED
AMBULATION: 0-->INDEPENDENT
AMBULATION: 0 - INDEPENDENT
CHANGE_IN_FUNCTIONAL_STATUS_SINCE_ONSET_OF_CURRENT_ILLNESS/INJURY: YES
TOILETING: 0 - INDEPENDENT
COMMUNICATION: 0 - UNDERSTANDS/COMMUNICATES WITHOUT DIFFICULTY
DRESS: 0-->INDEPENDENT
BATHING: 0 - INDEPENDENT
SWALLOWING: 0-->SWALLOWS FOODS/LIQUIDS WITHOUT DIFFICULTY
RETIRED_COMMUNICATION: 0-->UNDERSTANDS/COMMUNICATES WITHOUT DIFFICULTY
TRANSFERRING: 0 - INDEPENDENT
BATHING: 0-->INDEPENDENT
EATING: 0 - INDEPENDENT
FALL_HISTORY_WITHIN_LAST_SIX_MONTHS: NO
TOILETING: 0-->INDEPENDENT
DRESS: 0 - INDEPENDENT

## 2018-06-01 ASSESSMENT — PAIN DESCRIPTION - DESCRIPTORS: DESCRIPTORS: ACHING;DISCOMFORT

## 2018-06-01 NOTE — TELEPHONE ENCOUNTER
06/01/2018    Surgery Cross Cover Note      Received a phone call from [facility] regarding positive blood cultures growing GPC in pairs and chains from Cm catheter. I discussed with home infusion that he should present to the ED to be evaluated. They will relay this to the patient.    Álvaro Lyles   Surgery PGY3  [phone]

## 2018-06-01 NOTE — ED PROVIDER NOTES
[place] EMERGENCY DEPARTMENT ([facility])    History   CC: Bacteremia     HPI  Parker Acevedo Sr is a 47-year-old male with a past medical history that includes short gut syndrome who presents to the Emergency Department due to a blood infection. Patient reports symptoms including fever (102.6  F), right chest pain with right arm weakness, midline back pain, pounding headache, and generalized myalgias. He also notes symptoms of dysuria and difficulty urinating, as well as worsening abdominal pain. He states that these symptoms developed approximately three days ago (5/29) and started with a headache. Patient also notes that he began bleeding from his Cm catheter. He states that his symptoms are consistent with past infections. When he told his home infusion nurse about this yesterday (5/31), she tata lab cultures from both the Cm catheter and his arm. Results today showed positive blood cultures growing GPC in pairs and chains. Dr. Álvaro Llyes from General Surgery recommended that the patient come to the ED for further evaluation. Patient states that he has had his Cm catheter for approximately five months. He denies bowel or bladder incontinence, or numbness or tingling between the legs.     No current facility-administered medications for this encounter.      Current Outpatient Prescriptions   Medication     acetaminophen (TYLENOL) 500 MG tablet     albuterol (PROAIR HFA/PROVENTIL HFA/VENTOLIN HFA) 108 (90 Base) MCG/ACT Inhaler     carvedilol (COREG) 12.5 MG tablet     cyanocobalamin (VITAMIN B12) 1000 MCG/ML injection     ondansetron (ZOFRAN-ODT) 8 MG ODT tab     oxyCODONE (ROXICODONE) 5 MG/5ML solution     parenteral nutrition - PTA/DISCHARGE ORDER     sucralfate (CARAFATE) 1 GM/10ML suspension     [START ON 7/6/2018] amphetamine-dextroamphetamine (ADDERALL) 20 MG per tablet     [facility] INFUSION MANAGED PATIENT     fentaNYL (DURAGESIC) 12 mcg/hr 72 hr patch      "fentaNYL (DURAGESIC) 25 mcg/hr 72 hr patch     lidocaine (LIDODERM) 5 % Patch     naloxone (NARCAN) nasal spray     Needle, Disp, (BD DISP NEEDLES) 27G X 1/2\" MISC     order for DME     order for DME     vitamin D (ERGOCALCIFEROL) 54461 UNIT capsule     [DISCONTINUED] NO ACTIVE MEDICATIONS     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)      Chronic abdominal pain      Difficulty swallowing      Gastric ulcer, unspecified as acute or chronic, without mention of hemorrhage, perforation, or obstruction      Gastro-oesophageal reflux disease      Generalized weakness 1/30/2018     Head injury      Hiatal hernia      Other bladder disorder      Other chronic pain      Severe malnutrition (H)     TPN     Short gut syndrome      Tobacco abuse        Past Surgical History:   Procedure Laterality Date     AMPUTATION       APPENDECTOMY       BACK SURGERY  11/3/2014    curve in the spine     BIOPSY LYMPH NODE CERVICAL N/A 2/20/2015    Procedure: BIOPSY LYMPH NODE CERVICAL;  Surgeon: Baron Scanlon MD;  Location: [facility] OR     C GASTRIC BYPASS,OBESE<100CM SHAYLEE-EN-Y  2002    lost 300 pounds     CHOLECYSTECTOMY       DISCECTOMY, FUSION CERVICAL ANTERIOR ONE LEVEL, COMBINED N/A 2/15/2017    Procedure: COMBINED DISCECTOMY, FUSION CERVICAL ANTERIOR ONE LEVEL;  Surgeon: Darren Campos MD;  Location: [facility] OR     ENDOSCOPIC INSERTION TUBE GASTROSTOMY  9/9/2013    Procedure: ENDOSCOPIC INSERTION TUBE GASTROSTOMY;;  Surgeon: Francis Vyas MD;  [facility]     ENDOSCOPIC ULTRASOUND UPPER GASTROINTESTINAL TRACT (GI)  4/29/2011    Procedure:ENDOSCOPIC ULTRASOUND UPPER GASTROINTESTINAL TRACT (GI); Both Procedures done Conjointly; Surgeon:NEREIDA HOUSER; Location:[facility] OR     ENDOSCOPIC ULTRASOUND UPPER " GASTROINTESTINAL TRACT (GI)  9/9/2013    Procedure: ENDOSCOPIC ULTRASOUND UPPER GASTROINTESTINAL TRACT (GI);  Endoscopic Ultrasound Guide Gastrostomy Tube Placement  C-arm;  Surgeon: Noe Lizarraga MD;  [facility]     ENDOSCOPY  03/25/11    EGD, [place] [facility]     ENDOSCOPY  08/04/09    Upper Endoscopy, [place] [facility]     ENDOSCOPY  01/05/09    Upper Endoscopy, [place] [facility]     ESOPHAGOSCOPY, GASTROSCOPY, DUODENOSCOPY (EGD), COMBINED  4/20/2011    Procedure:COMBINED ESOPHAGOSCOPY, GASTROSCOPY, DUODENOSCOPY (EGD); Surgeon:BLU VEGA; [facility]     ESOPHAGOSCOPY, GASTROSCOPY, DUODENOSCOPY (EGD), COMBINED  6/15/2011    Procedure:COMBINED ESOPHAGOSCOPY, GASTROSCOPY, DUODENOSCOPY (EGD); Surgeon:BLU VEGA; [facility]     ESOPHAGOSCOPY, GASTROSCOPY, DUODENOSCOPY (EGD), COMBINED  6/12/2013    Procedure: COMBINED ESOPHAGOSCOPY, GASTROSCOPY, DUODENOSCOPY (EGD);;  Surgeon: Blu Vega MD;  [facility]     ESOPHAGOSCOPY, GASTROSCOPY, DUODENOSCOPY (EGD), COMBINED  11/22/2013    Procedure: COMBINED ESOPHAGOSCOPY, GASTROSCOPY, DUODENOSCOPY (EGD);;  Surgeon: Blu Vega MD;  Location:  OR     ESOPHAGOSCOPY, GASTROSCOPY, DUODENOSCOPY (EGD), COMBINED  4/30/2014    Procedure: COMBINED ESOPHAGOSCOPY, GASTROSCOPY, DUODENOSCOPY (EGD);  Surgeon: Blu Vega MD;  [facility]     ESOPHAGOSCOPY, GASTROSCOPY, DUODENOSCOPY (EGD), COMBINED N/A 2/20/2015    Procedure: COMBINED ESOPHAGOSCOPY, GASTROSCOPY, DUODENOSCOPY (EGD), BIOPSY SINGLE OR MULTIPLE;  Surgeon: Baron Scanlon MD;  Location:  OR     ESOPHAGOSCOPY, GASTROSCOPY, DUODENOSCOPY (EGD), COMBINED N/A 9/30/2015    Procedure: COMBINED ESOPHAGOSCOPY, GASTROSCOPY, DUODENOSCOPY (EGD);  Surgeon: Blu Vega MD;  [facility]     GASTRECTOMY  6/22/2012    Procedure: GASTRECTOMY;  Open Approach, Excise Ulcers,Partial Gastrectomy, Esophagojejunostomy, Hiatal Hernia Repair, Extensive  Lysis of Adhesions and Esaphagogastrodudenoscopy.;  Surgeon: Francis Vyas MD;  [facility]     GASTROJEJUNOSTOMY  08/26/09    Extensice enterolysis, partial resect. jejunum, part. resect gastric pouch, gastrojejunostomy anastomosis     HC ESOPH/GAS REFLUX TEST W NASAL IMPED ELECTRODE  8/5/2013    Procedure: ESOPHAGEAL IMPEDENCE FUNCTION TEST 1 HOUR OR LESS;  Surgeon: Halie Lang MD;  [facility]     HEAD & NECK SURGERY  2/15/2017    C5-C6     HERNIA REPAIR  2006    Umbilical hernia     HERNIORRHAPHY HIATAL  6/22/2012    Procedure: HERNIORRHAPHY HIATAL;;  Surgeon: Francis Vyas MD;  [facility]     HERNIORRHAPHY INGUINAL  11/22/2013    Procedure: HERNIORRHAPHY INGUINAL;;  Surgeon: Francis Vyas MD;  [facility]     INSERT PORT VASCULAR ACCESS Right 12/19/2017    Procedure: INSERT PORT VASCULAR ACCESS;  Right Chest Port Placement ;  Surgeon: Lisandro Alejandro PA-C;  [facility]     LAPAROTOMY EXPLORATORY  11/22/2013    Procedure: LAPAROTOMY EXPLORATORY;  Exploratory Laparotomy, Upper Endoscopy, Left Inguinal Hernia Repair;  Surgeon: Francis Vyas MD;  [facility]     ORTHOPEDIC SURGERY       PICC INSERTION Right 03/16/2017    5fr DL BioFlo PICC, 42cm (3cm external) in the R medial brachial vein w/ tip in the SVC RA junction.     PICC INSERTION Left 09/23/2017    5fr DL BioFlo PICC, 45cm (1cm external) in the L basilic vein w/ tip in the SVC RA junction.     SHAYLEE EN Y BOWEL  2003     SOFT TISSUE SURGERY       SOFT TISSUE SURGERY       THORACIC SURGERY       TONSILLECTOMY         Family History   Problem Relation Age of Onset     GASTROINTESTINAL DISEASE Mother      Crohns disease     Anxiety Disorder Mother      Thyroid Disease Mother      Grave's disease     CANCER Father      ear cancer-skin cancer/melanoma     Breast Cancer Maternal Grandmother      Anxiety Disorder Sister      DIABETES Maternal Uncle      Breast Cancer Other      Hypertension No  family hx of      Hyperlipidemia No family hx of      CEREBROVASCULAR DISEASE No family hx of      Prostate Cancer No family hx of      Depression No family hx of      Anesthesia Reaction No family hx of      Asthma No family hx of      OSTEOPOROSIS No family hx of      Genetic Disorder No family hx of      Obesity No family hx of      MENTAL ILLNESS No family hx of      Substance Abuse No family hx of        Social History   Substance Use Topics     Smoking status: Light Tobacco Smoker     Packs/day: 0.10     Years: 3.00     Types: Cigarettes     Smokeless tobacco: Current User      Comment: I use an e cig every now and than     Alcohol use No      Comment: quit      Allergies   Allergen Reactions     Bactrim [Sulfamethoxazole W/Trimethoprim] Rash     Penicillins Anaphylaxis     Doxycycline Rash     Vancomycin Rash     Rash after receiving vancomycin 3/28/16 (red man's?). Tolerated with slower infusion and diphenhydramine premed.     I have reviewed the Medications, Allergies, Past Medical and Surgical History, and Social History in the Epic system.    Review of Systems   Constitutional: Positive for fever (102.6  F).   Cardiovascular: Positive for chest pain (right).   Gastrointestinal: Positive for abdominal pain (chronic but worsening).   Genitourinary: Positive for difficulty urinating and dysuria.        Negative for bowel or bladder incontinence   Musculoskeletal: Positive for back pain (lower midline) and myalgias.   Neurological: Positive for weakness (right arm) and headaches.        Negative for numbness or tingling between legs   All other systems reviewed and are negative.      Physical Exam   BP: 140/77  Pulse: (!) 45  Temp: 99.9  F (37.7  C)  Resp: 18  SpO2: 100 %    Physical Exam    GENERAL: Well-appearing, no acute distress.  HENT:    Head: Normocephalic. Atraumatic.   Ears: External ears normal, hearing grossly intact.   Nose: No external deformities.   Throat/Mouth: Moist oral mucosa. Posterior  oropharynx is clear.  EYES: Pupils equal, round, reactive. No conjunctival pallor, sclerae clear. EOMI.  CV: Bradycardic rate, regular rhythm. Warm and well perfused.  PULMONARY: Effort normal. No accessory muscle use. Good air movement. No stridor.  GI: Soft, non-distended, non-tender to palpation.  NEURO: Alert, awake. Oriented x3. No focal sensory loss or muscular weakness. No facial drooping, no slurring of speech. 5/5 strength in the bilateral LEs.  MSK:    Neck: Supple.   Extremities: No gross deformity. Coordinated movement of all extremities.   Back: Mild tenderness to palpation over the midline of the upper lumbar spine.  INTEGUMENTARY: Warm. No diaphoresis. No rashes, jaundice, or ecchymoses.  PSYCH: Appropriate affect. Behavior is normal. Linear thought process. Normal insight.    ED Course   6:16 PM  The patient was seen and examined by Guy Guerin MD in Room 4.   ED Course     Procedures       EKG Interpretation:      Interpreted by Guy Guerin  Time reviewed: 18:25  Symptoms at time of EKG: Chest pain  Rhythm: sinus bradycardia  Rate: 50-60  Axis: Normal  Ectopy: none  Conduction: normal  ST Segments/ T Waves: Non-specific ST-T wave changes  Q Waves: none  Comparison to prior: When compared to previous on 4/25/18, sinus bradycardia is new, otherwise unchanged morphologically.    Clinical Impression: sinus bradycardia    Critical Care time:  none     Labs Ordered and Resulted from Time of ED Arrival Up to the Time of Departure from the ED   CBC WITH PLATELETS DIFFERENTIAL - Abnormal; Notable for the following:        Result Value    RBC Count 4.16 (*)     Hemoglobin 13.2 (*)     Hematocrit 39.4 (*)     Platelet Count 124 (*)     All other components within normal limits   BASIC METABOLIC PANEL - Abnormal; Notable for the following:     Sodium 145 (*)     Urea Nitrogen 5 (*)     Creatinine 0.59 (*)     Calcium 8.0 (*)     All other components within normal limits   UA MACROSCOPIC  WITH REFLEX TO MICRO AND CULTURE - Abnormal; Notable for the following:     Blood Urine Small (*)     pH Urine 8.0 (*)     All other components within normal limits   CRP INFLAMMATION - Abnormal; Notable for the following:     CRP Inflammation 26.0 (*)     All other components within normal limits   LACTIC ACID WHOLE BLOOD   CK TOTAL   TROPONIN I     XR Chest 2 Views   Final Result   IMPRESSION: No acute cardiopulmonary abnormality. If the patient is   immunocompromised and there is concern for infection, airspace   infection can be quite subtle in immunocompromised patients and CT   would be appropriate.      JANEL TORRES MD        Assessments & Plan (with Medical Decision Making)   Primary Evaluation:  Patient was seen and examined in the Emergency Department and was noted to be in no acute distress. Initial vital signs demonstrated bradycardia. Airway was patent and protected. Breathing was intact. Hemodynamics were stable.    Patient's history was reviewed in the chart and with the patient.    MDM and Disposition:  Patient presented to the emergency department for evaluation of positive blood cultures.  He does have a chronic indwelling central catheter and had reported fevers at home.    An IV was placed and labs were drawn, including blood cultures both from the patient's chronic line as well as peripherally.  Patient was given pain medication, IV fluids, and empirically treated with IV vancomycin. CXR was obtained to evaluate for possible concomitant pneumonia, and this was negative.  UA was clear.  There is concern for possible line infection, however the patient remained hemodynamically stable while in the emergency department.  He will require an inpatient admission for further evaluation and management, and the decision to pull the catheter (it will need to be pulled likely by IR) can be made by the inpatient team.    Given the patient's clinical history, physical exam, and results of our diagnostic  work-up, the decision was made to admit the patient to the hospital in stable condition for further evaluation and treatment of bacteremia. This was discussed with the patient and all who were present were in agreement with the plan. Report was called to the admitting team who accepted the patient.    Patient remained hemodynamically stable throughout his stay in the Emergency Department.    Final Clinical Impression:  Bacteremia    I have reviewed the nursing notes.  I have reviewed the findings, diagnosis, plan and need for follow up with the patient.  Guy Guerin DO  Emergency Medicine  Pager: [phone]    Final diagnoses:   Bacteremia     I, Abbey Fuchs, am serving as a trained medical scribe to document services personally performed by Chico Guerin DO, based on the provider's statements to me.   I, Chico Guerin DO, was physically present and have reviewed and verified the accuracy of this note documented by Abbey Fuchs.    6/1/2018   [facility], EMERGENCY DEPARTMENT     Guy Guerin MD  06/19/18 0029

## 2018-06-01 NOTE — TELEPHONE ENCOUNTER
Patient is now requesting scripts be sent to:      [facility] [place] - [address]  [address] [address]  Phone: [phone] Fax: [phone]    Amalia FONTANA    [facility] [place]

## 2018-06-01 NOTE — TELEPHONE ENCOUNTER
Ok. Will be mailing on 06/02/18 to [facility]. Patient was notified via Wedivitehart.      Lyndsay Solo MA

## 2018-06-01 NOTE — IP AVS SNAPSHOT
MRN:[id number]                      After Visit Summary   6/1/2018    Parker Acevedo     MRN: [id number]           Thank you!     Thank you for choosing [facility] for your care. Our goal is always to provide you with excellent care. Hearing back from our patients is one way we can continue to improve our services. Please take a few minutes to complete the written survey that you may receive in the mail after you visit with us. Thank you!        Patient Information     Date Of Birth          1970        Designated Caregiver       Most Recent Value    Caregiver    Will someone help with your care after discharge? yes    Name of designated caregiver Carla Acevedo    Phone number of caregiver [phone]    Caregiver address same as the pt      About your hospital stay     You were admitted on:  June 1, 2018 You last received care in the:  [address] [facility]    You were discharged on:  June 6, 2018        Reason for your hospital stay       Admitted with bacteremia due to line infection                  Who to Call     For medical emergencies, please call 911.  For non-urgent questions about your medical care, please call your primary care provider or clinic, [phone]          Attending Provider     Provider Specialty    Guy Guerin MD Emergency Medicine    Juanjose Anderson MD Internal Medicine    Paul Armstrong MD Internal Medicine    Aldo Kumar MD Internal Medicine       Primary Care Provider Office Phone # Fax #    Esteban Daly [phone]       When to contact your care team       Notify PCP/ID if pain/redness around PICC site or new fevers.                  After Care Instructions     Activity       Your activity upon discharge: activity as tolerated            Diet       Follow this diet upon discharge: full liquid diet            IV access       You are going home with the following vascular access device: PICC.             Tubes and drains       You are going home with the following tubes or drains: feeding tube G-Tube. Tube cares per hospital or home care instructions. Consider continuous venting overnight.                  Follow-up Appointments     Follow Up and recommended labs and tests       Twice weekly creatinine. Next vancomycin trough Friday 6/8 before 2 pm dose. See PCP next 7 days for hospital follow up. ID clinic follow up 6/21 to reassess timeframe for Cm replacement. Also have visit with Dr Vyas that day and Dr. Milligan on 6/27.                  Your next 10 appointments already scheduled     Jun 21, 2018 10:00 AM CDT   (Arrive by 9:45 AM)   Return Visit with Gabbi Thompson MD   [facility] ([facility])    [address] [place] [address]  [address]  [facility] [address] [phone]            Jun 21, 2018 11:20 AM CDT   (Arrive by 11:05 AM)   RETURN BARIATRIC SURGERY with Francis Vyas MD   [facility] ([facility])    [address] [place] [address]  4th Floor  [facility] [address] [phone]            Jun 27, 2018  1:00 PM CDT   Return Visit with Patti Milligan MD   [facility] ([facility])    [address] [facility] [address] [phone]              Additional Services     Home infusion referral       Your provider has referred you to: [facility]: [facility] [place] [phone]   [email or web address]    Local Address (if different from home address): N/A    Anticipated Length of Therapy: through June 16/18 for antibiotic    Vancomycin 1250 mg IV every 8 hours    Home Infusion Pharmacist to adjust therapy based on labs and clinical assessments: Yes    Labs:  May draw labs from Venous Catheter: Yes  Home Infusion Pharmacist to order labs based on therapy type and clinical  assessments: Yes  Call/Fax Lab Results to: Infectious Disease, Dr. Townsend    Agency Staff to assess nursing needs for Infusion Therapy.    Access Device Management:  IV Access Type: PICC  Flush with Heparin and Normal Saline IVP PRN and routine site care (per agency protocol) to maintain access device? Yes    Continue previous orders for  TPN and supplies            Medication Therapy Management Referral       MTM referral reason            Patient had a hospital or ED visit in last 6 months and has more than 10   PTA or Discharge medications       This service is designed to help you get the most from your medications.  A specially trained pharmacist will work closely with you and your doctors  to solve any problems related to your medications and to help you get the   best results from taking them.      The Medication Therapy Management staff will call you to schedule an appointment.                  Pending Results     Date and Time Order Name Status Description    6/4/2018 1128 Blood culture Preliminary     6/4/2018 1128 Blood culture Preliminary     6/1/2018 1756 Blood culture Preliminary             Statement of Approval     Ordered          06/06/18 1124  I have reviewed and agree with all the recommendations and orders detailed in this document.  EFFECTIVE NOW     Approved and electronically signed by:  Leana Rosales PA             Admission Information     Date & Time Provider Department Dept. Phone    6/1/2018 Aldo Kumar MD [address] [facility] [place] [phone]      Your Vitals Were     Blood Pressure Pulse Temperature Respirations Weight Pulse Oximetry    111/69 (BP Location: Right arm) 73 97  F (36.1  C) (Oral) 18 89.5 kg (197 lb 6.4 oz) 99%    BMI (Body Mass Index)                   27.53 kg/m2           [employer] Information     [employer] gives you secure access to your electronic health record. If you see a primary care provider, you can also send messages to your care team and  make appointments. If you have questions, please call your primary care clinic.  If you do not have a primary care provider, please call [phone] and they will assist you.        Care EveryWhere ID     This is your Care EveryWhere ID. This could be used by other organizations to access your [facility] medical records  [id number]        Equal Access to Services     KATHRYN GUZMAN : Hadii kameron huff hadmeaghano Sooswaldali, waaxda luqadaha, qaybta kaalmada dorothyleslievictoriano, carmela woodдмитрийthalia dumont . So [facility] [phone].    ATENCIÓN: Si habla español, tiene a hicks disposición servicios gratuitos de asistencia lingüística. Chu al [phone].    We comply with applicable federal civil rights laws and [place] laws. We do not discriminate on the basis of race, color, national origin, age, disability, sex, sexual orientation, or gender identity.               Review of your medicines      START taking        Dose / Directions    vancomycin 1,250 mg   Indication:  Bacteria in the Blood   Used for:  Bacteremia        Dose:  1250 mg   Inject 1,250 mg into the vein every 8 hours for 10 days Through 6/16/18. Consider retiming doses to 0600, 1400, 2200 to avoid middle of the night dosing (current schedule 0200, 1000, 1800). Can achieve this by shifting doses up to 0800, 1600, 2400 for one day (6/7/18) then to goal schedule 0600, 1400, 2200 the following day (6/8/18).   Refills:  0         CONTINUE these medicines which may have CHANGED, or have new prescriptions. If we are uncertain of the size of tablets/capsules you have at home, strength may be listed as something that might have changed.        Dose / Directions    amphetamine-dextroamphetamine 20 MG per tablet   Commonly known as:  ADDERALL   This may have changed:  Another medication with the same name was removed. Continue taking this medication, and follow the directions you see here.   Used for:  ADHD (attention deficit hyperactivity disorder), inattentive type         Dose:  20 mg   Start taking on:  7/6/2018   Take 1 tablet (20 mg) by mouth daily   Quantity:  30 tablet   Refills:  0       * DiphenhydrAMINE HCl 12.5 MG Chew   This may have changed:    - how much to take  - when to take this  - reasons to take this  - additional instructions   Used for:  Bacteremia        Dose:  25 mg   Take 25 mg by mouth 3 times daily Prior to vancomycin infusions   Quantity:  112 tablet   Refills:  0       * diphenhydrAMINE 12.5 MG/5ML solution   Commonly known as:  BENADRYL   This may have changed:  You were already taking a medication with the same name, and this prescription was added. Make sure you understand how and when to take each.   Used for:  Bacteremia        Dose:  25 mg   Take 10 mLs (25 mg) by mouth every 8 hours for 10 days   Quantity:  300 mL   Refills:  0       lidocaine 5 % Patch   Commonly known as:  LIDODERM   This may have changed:  additional instructions   Used for:  Chronic bilateral low back pain without sciatica, Chronic abdominal pain, Myofascial pain        Dose:  1-2 patch   Place 1-2 patches onto the skin every 24 hours Wear for 12 hours, remove for 12 hours.  OK to cut to better fit to size.   Quantity:  60 patch   Refills:  6       parenteral nutrition - PTA/DISCHARGE ORDER   This may have changed:  additional instructions        [facility] to resume previous home TPN but run x 24 hours the first day until labs are ok. Then cycle x 14 hours per previous home regimen. He has been off TPN x 5 days here.   Refills:  0       sucralfate 1 GM/10ML suspension   Commonly known as:  CARAFATE   This may have changed:    - when to take this  - reasons to take this   Used for:  Nausea        Dose:  1 g   Take 10 mLs (1 g) by mouth 4 times daily   Quantity:  1200 mL   Refills:  11       vitamin D 83230 UNIT capsule   Commonly known as:  ERGOCALCIFEROL   This may have changed:  additional instructions   Used for:  Vitamin D deficiency        Dose:  58114 Units   Take 1 capsule  "(50,000 Units) by mouth every 7 days   Quantity:  12 capsule   Refills:  3       * Notice:  This list has 2 medication(s) that are the same as other medications prescribed for you. Read the directions carefully, and ask your doctor or other care provider to review them with you.      CONTINUE these medicines which have NOT CHANGED        Dose / Directions    acetaminophen 500 MG tablet   Commonly known as:  TYLENOL        Dose:  1000 mg   Take 1,000 mg by mouth every 6 hours as needed for fever   Refills:  0       albuterol 108 (90 Base) MCG/ACT Inhaler   Commonly known as:  PROAIR HFA/PROVENTIL HFA/VENTOLIN HFA   Used for:  Productive cough        Dose:  2 puff   Inhale 2 puffs into the lungs every 4 hours as needed for shortness of breath / dyspnea or wheezing   Quantity:  1 Inhaler   Refills:  3       COREG 12.5 MG tablet   Generic drug:  carvedilol        Dose:  12.5 mg   Take 12.5 mg by mouth 2 times daily (with meals)   Refills:  0       cyanocobalamin 1000 MCG/ML injection   Commonly known as:  VITAMIN B12   Used for:  Bariatric surgery status        Dose:  1 mL   Inject 1 mL (1,000 mcg) into the muscle every 30 days   Quantity:  1 mL   Refills:  11       [facility] PAT[facility]   Used for:  S/P bariatric surgery        Contact [facility] for patient specific medication information at [phone] on admission and discharge from the hospital.  Phones are answered 24 hours a day 7 days a week 365 days a year.  Providers - Choose \"CONTINUE HOME MED (no script)\" at discharge if patient treatment with home infusion will continue.   Refills:  0       * fentaNYL 12 mcg/hr 72 hr patch   Commonly known as:  DURAGESIC   Used for:  Chronic abdominal pain, Encounter for long-term opiate analgesic use        Dose:  1 patch   Place 1 patch onto the skin every 48 hours . Remove old patch.  Fill on/after 6/22 to start on/after 6/24   Quantity:  15 patch   Refills:  0       * fentaNYL 25 mcg/hr " "72 hr patch   Commonly known as:  DURAGESIC   Used for:  Chronic abdominal pain, Encounter for long-term opiate analgesic use        Dose:  1 patch   Place 1 patch onto the skin every 48 hours remove old patch.  Release on/after 6/22/18 to start on/after 6/24/18   Quantity:  15 patch   Refills:  0       naloxone nasal spray   Commonly known as:  NARCAN   Used for:  Encounter for long-term opiate analgesic use, Chronic abdominal pain        Dose:  4 mg   Spray 1 spray (4 mg) into one nostril alternating nostrils as needed for opioid reversal (every 2-3 minutes until assistance arrives.)   Quantity:  0.2 mL   Refills:  0       Needle (Disp) 27G X 1/2\" Misc   Commonly known as:  BD DISP NEEDLES   Used for:  Bariatric surgery status        Dose:  1 Device   1 Device every 30 days Use for cyanocobalamin injection once q 30 days.   Quantity:  3 each   Refills:  4       ondansetron 8 MG ODT tab   Commonly known as:  ZOFRAN-ODT   Used for:  Nausea        Dose:  8 mg   Take 1 tablet (8 mg) by mouth every 8 hours as needed for nausea   Quantity:  90 tablet   Refills:  3       * order for DME   Used for:  Bariatric surgery status        Injection Supplies for Vitamin B12: 3cc syringes w/ 27 gauge needles, 1/2 inch length   Quantity:  12 each   Refills:  0       * order for DME   Used for:  Bilateral edema of lower extremity        Equipment being ordered: Bilateral knee high chronic venous insufficiency stockings--  mild-moderate pressures.   Quantity:  4 each   Refills:  5       oxyCODONE 5 MG/5ML solution   Commonly known as:  ROXICODONE   Used for:  Encounter for long-term opiate analgesic use, Chronic abdominal pain        Dose:  10-15 mg   Take 10-15 mLs (10-15 mg) by mouth every 4 hours as needed for moderate to severe pain Max of 55 mg/day this month. Wean as able Fill on/after 6/8/18 not to start untill 6/11/18. Early fill for travel.   Quantity:  1650 mL   Refills:  0       * Notice:  This list has 4 medication(s) " that are the same as other medications prescribed for you. Read the directions carefully, and ask your doctor or other care provider to review them with you.      STOP taking     mupirocin 2 % ointment   Commonly known as:  BACTROBAN           nystatin-triamcinolone cream   Commonly known as:  MYCOLOG II           parenteral nutrition - PTA/DISCHARGE ORDER                Where to get your medicines      Some of these will need a paper prescription and others can be bought over the counter. Ask your nurse if you have questions.     You don't need a prescription for these medications     diphenhydrAMINE 12.5 MG/5ML solution    DiphenhydrAMINE HCl 12.5 MG Chew    vancomycin 1,250 mg                Protect others around you: Learn how to safely use, store and throw away your medicines at www.disposemymeds.org.        ANTIBIOTIC INSTRUCTION     You've Been Prescribed an Antibiotic - Now What?  Your healthcare team thinks that you or your loved one might have an infection. Some infections can be treated with antibiotics, which are powerful, life-saving drugs. Like all medications, antibiotics have side effects and should only be used when necessary. There are some important things you should know about your antibiotic treatment.      Your healthcare team may run tests before you start taking an antibiotic.    Your team may take samples (e.g., from your blood, urine or other areas) to run tests to look for bacteria. These test can be important to determine if you need an antibiotic at all and, if you do, which antibiotic will work best.      Within a few days, your healthcare team might change or even stop your antibiotic.    Your team may start you on an antibiotic while they are working to find out what is making you sick.    Your team might change your antibiotic because test results show that a different antibiotic would be better to treat your infection.    In some cases, once your team has more information, they  learn that you do not need an antibiotic at all. They may find out that you don't have an infection, or that the antibiotic you're taking won't work against your infection. For example, an infection caused by a virus can't be treated with antibiotics. Staying on an antibiotic when you don't need it is more likely to be harmful than helpful.      You may experience side effects from your antibiotic.    Like all medications, antibiotics have side effects. Some of these can be serious.    Let you healthcare team know if you have any known allergies when you are admitted to the hospital.    One significant side effect of nearly all antibiotics is the risk of severe and sometimes deadly diarrhea caused by Clostridium difficile (C. Difficile). This occurs when a person takes antibiotics because some good germs are destroyed. Antibiotic use allows C. diificile to take over, putting patients at high risk for this serious infection.    As a patient or caregiver, it is important to understand your or your loved one's antibiotic treatment. It is especially important for caregivers to speak up when patients can't speak for themselves. Here are some important questions to ask your healthcare team.    What infection is this antibiotic treating and how do you know I have that infection?    What side effects might occur from this antibiotic?    How long will I need to take this antibiotic?    Is it safe to take this antibiotic with other medications or supplements (e.g., vitamins) that I am taking?     Are there any special directions I need to know about taking this antibiotic? For example, should I take it with food?    How will I be monitored to know whether my infection is responding to the antibiotic?    What tests may help to make sure the right antibiotic is prescribed for me?      Information provided by:  www.cdc.gov/getsmart  U.S. Department of Health and Human Services  Centers for disease Control and  Prevention  National Center for Emerging and Zoonotic Infectious Diseases  Division of Healthcare Quality Promotion        PARENTERAL NUTRITION FORMULA      (Show up to 1 orders; newest on the left.)     None                   Medication List: This is a list of all your medications and when to take them. Check marks below indicate your daily home schedule. Keep this list as a reference.      Medications           Morning Afternoon Evening Bedtime As Needed    acetaminophen 500 MG tablet   Commonly known as:  TYLENOL   Take 1,000 mg by mouth every 6 hours as needed for fever                                albuterol 108 (90 Base) MCG/ACT Inhaler   Commonly known as:  PROAIR HFA/PROVENTIL HFA/VENTOLIN HFA   Inhale 2 puffs into the lungs every 4 hours as needed for shortness of breath / dyspnea or wheezing                                amphetamine-dextroamphetamine 20 MG per tablet   Commonly known as:  ADDERALL   Take 1 tablet (20 mg) by mouth daily   Start taking on:  7/6/2018                                COREG 12.5 MG tablet   Take 12.5 mg by mouth 2 times daily (with meals)   Last time this was given:  12.5 mg on 6/6/2018  8:15 AM   Generic drug:  carvedilol                                cyanocobalamin 1000 MCG/ML injection   Commonly known as:  VITAMIN B12   Inject 1 mL (1,000 mcg) into the muscle every 30 days                                * DiphenhydrAMINE HCl 12.5 MG Chew   Take 25 mg by mouth 3 times daily Prior to vancomycin infusions                                * diphenhydrAMINE 12.5 MG/5ML solution   Commonly known as:  BENADRYL   Take 10 mLs (25 mg) by mouth every 8 hours for 10 days   Last time this was given:  25 mg on 6/6/2018 10:49 AM                                [facility] INFUSION MANAGED PATIENT   Contact [facility] for patient specific medication information at [phone] on admission and discharge from the hospital.  Phones are answered 24 hours a day 7 days a week 365  "days a year.  Providers - Choose \"CONTINUE HOME MED (no script)\" at discharge if patient treatment with home infusion will continue.                                * fentaNYL 12 mcg/hr 72 hr patch   Commonly known as:  DURAGESIC   Place 1 patch onto the skin every 48 hours . Remove old patch.  Fill on/after 6/22 to start on/after 6/24   Last time this was given:  1 patch on 6/5/2018 10:57 PM                                * fentaNYL 25 mcg/hr 72 hr patch   Commonly known as:  DURAGESIC   Place 1 patch onto the skin every 48 hours remove old patch.  Release on/after 6/22/18 to start on/after 6/24/18   Last time this was given:  1 patch on 6/5/2018 10:56 PM                                lidocaine 5 % Patch   Commonly known as:  LIDODERM   Place 1-2 patches onto the skin every 24 hours Wear for 12 hours, remove for 12 hours.  OK to cut to better fit to size.                                naloxone nasal spray   Commonly known as:  NARCAN   Spray 1 spray (4 mg) into one nostril alternating nostrils as needed for opioid reversal (every 2-3 minutes until assistance arrives.)                                Needle (Disp) 27G X 1/2\" Misc   Commonly known as:  BD DISP NEEDLES   1 Device every 30 days Use for cyanocobalamin injection once q 30 days.                                ondansetron 8 MG ODT tab   Commonly known as:  ZOFRAN-ODT   Take 1 tablet (8 mg) by mouth every 8 hours as needed for nausea   Last time this was given:  4 mg on 6/6/2018  5:44 AM                                * order for DME   Injection Supplies for Vitamin B12: 3cc syringes w/ 27 gauge needles, 1/2 inch length                                * order for DME   Equipment being ordered: Bilateral knee high chronic venous insufficiency stockings--  mild-moderate pressures.                                oxyCODONE 5 MG/5ML solution   Commonly known as:  ROXICODONE   Take 10-15 mLs (10-15 mg) by mouth every 4 hours as needed for moderate to severe pain " Max of 55 mg/day this month. Wean as able Fill on/after 6/8/18 not to start untill 6/11/18. Early fill for travel.   Last time this was given:  15 mg on 6/6/2018 10:37 AM                                parenteral nutrition - PTA/DISCHARGE ORDER   [facility] to resume previous home TPN but run x 24 hours the first day until labs are ok. Then cycle x 14 hours per previous home regimen. He has been off TPN x 5 days here.                                sucralfate 1 GM/10ML suspension   Commonly known as:  CARAFATE   Take 10 mLs (1 g) by mouth 4 times daily   Last time this was given:  1 g on 6/6/2018 11:38 AM                                vancomycin 1,250 mg   Inject 1,250 mg into the vein every 8 hours for 10 days Through 6/16/18. Consider retiming doses to 0600, 1400, 2200 to avoid middle of the night dosing (current schedule 0200, 1000, 1800). Can achieve this by shifting doses up to 0800, 1600, 2400 for one day (6/7/18) then to goal schedule 0600, 1400, 2200 the following day (6/8/18).   Last time this was given:  1,250 mg on 6/6/2018 11:20 AM                                vitamin D 52931 UNIT capsule   Commonly known as:  ERGOCALCIFEROL   Take 1 capsule (50,000 Units) by mouth every 7 days   Last time this was given:  50,000 Units on 6/3/2018  7:49 AM                                * Notice:  This list has 6 medication(s) that are the same as other medications prescribed for you. Read the directions carefully, and ask your doctor or other care provider to review them with you.

## 2018-06-01 NOTE — IP AVS SNAPSHOT
"    [address] [facility]: [phone]                                              INTERAGENCY TRANSFER FORM - PHYSICIAN ORDERS   2018                    Hospital Admission Date: 2018  SARA HERRERAGLORY    : 1970  Sex: Male        Attending Provider: Aldo Kumar MD     Allergies:  Bactrim [Sulfamethoxazole W/trimethoprim], Penicillins, Doxycycline, Vancomycin    Infection:  None   Service:  INTERNAL MED    Ht:  1.803 m (5' 11\")   Wt:  89.5 kg (197 lb 6.4 oz)   Admission Wt:  89.4 kg (197 lb 3.2 oz)    BMI:  27.53 kg/m 2   BSA:  2.12 m 2            Patient PCP Information     Provider PCP Type    Esteban Daly MD General      ED Clinical Impression     Diagnosis Description Comment Added By Time Added    Bacteremia [R78.81] Bacteremia [R78.81]  Guy Guerin MD 2018  7:37 PM      Hospital Problems as of 2018              Priority Class Noted POA    Bacteremia Medium  2017 Yes      Non-Hospital Problems as of 2018              Priority Class Noted    Peptic ulcer disease Medium  2010    Gastric bypass status for obesity Medium  2010    CARDIOVASCULAR SCREENING; LDL GOAL LESS THAN 160 Medium  10/31/2010    Chronic abdominal pain Medium  2012    Ulcer (H) Medium  2012    Vomiting Medium  2012    Anemia Medium  2012    ADHD (attention deficit hyperactivity disorder), inattentive type Medium  2013    Vitamin B12 deficiency without anemia Medium  2013    Thiamine deficiency Medium  2013    Dehydration Medium  2013    Dysphagia Medium  2013    Weight loss, non-intentional Medium  2013    Malnutrition (H) Medium  2013    Chronic anxiety Medium  2013    Constipation Medium  10/1/2013    Bile reflux esophagitis Medium  10/16/2013    Former smoker Medium  2014    Vitamin D deficiency Medium  2014    Iron deficiency Medium  2014    Health Care Home Medium  2015    " Chronic pain Medium  7/7/2015    Insomnia Medium  8/13/2015    Positive blood culture Medium  3/29/2016    Fungemia Medium  4/11/2016    Chronic nausea Medium  5/10/2016    Gastrostomy tube in place (H) Medium  8/9/2016    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) Medium  Unknown    Severe malnutrition (H) Medium  Unknown    Short gut syndrome Medium  Unknown    Anxiety Medium  Unknown    Status post cervical spinal arthrodesis Medium  2/15/2017    Port or reservoir infection, initial encounter Medium  3/16/2017    Abnormal echocardiogram Medium  4/11/2017    Low serum iron Medium  9/8/2017    Anemia, iron deficiency Medium  9/8/2017    Catheter-related bloodstream infection (CRBSI) Medium  9/23/2017    E coli bacteremia Medium  1/13/2018    Generalized weakness Medium  1/30/2018      Code Status History     Date Active Date Inactive Code Status Order ID Comments User Context    6/6/2018 10:47 AM  Full Code [id number]  Leana Rosales PA Outpatient    6/1/2018 10:38 PM 6/6/2018 10:47 AM Full Code [id number]  Linda Rachel PA-C Inpatient    1/18/2018  9:31 AM 6/1/2018 10:38 PM Full Code [id number]  Koko Dozier PA-C Outpatient    1/13/2018  2:59 AM 1/17/2018  4:12 PM Full Code [id number]  Pat Mitchell MD Inpatient    9/23/2017  3:16 PM 1/13/2018  2:59 AM Full Code [id number]  Theresa Pink MD Outpatient    9/19/2017 10:55 PM 9/23/2017  3:16 PM Full Code [id number]  Kai Parry MD Inpatient    6/29/2017  4:29 AM 6/30/2017 12:17 PM Full Code [id number]  Kiera Santana MD Inpatient    2/15/2017  4:40 PM 2/16/2017  3:20 PM Full Code [id number]  Fernandez Mann PA-C Inpatient    2/15/2017  2:43 PM 2/15/2017  4:40 PM Full Code [id number]  Fernandez Mann PA-C Outpatient    10/28/2016 11:13 AM 2/15/2017  2:43 PM Full Code [id number]  Marko Betancourt MD Outpatient    10/25/2016  7:37 PM 10/28/2016 11:13 AM Full Code During Procedure [id number]  Jefferson eSe MD Inpatient     4/15/2016  1:36 PM 10/25/2016  7:37 PM Full Code [id number]  Blas Edwards MD Outpatient    4/11/2016  8:54 PM 4/15/2016  1:36 PM Full Code [id number]  Leana Jiménez PA Inpatient    4/1/2016 10:33 AM 4/11/2016  8:54 PM Full Code [id number]  Annel Biswas PA-C Outpatient    3/29/2016  3:02 AM 4/1/2016 10:33 AM Full Code [id number]  Shannon Puga MD Inpatient    1/17/2016  1:38 PM 3/29/2016  3:02 AM Full Code [id number]  Ilsa Shine PA Outpatient    1/16/2016  4:34 AM 1/17/2016  1:38 PM Full Code [id number]  Tino Brown MD Inpatient    8/7/2015  3:52 PM 1/16/2016  4:34 AM Full Code [id number]  Jamil Rios MD Outpatient    8/5/2015  6:39 AM 8/7/2015  3:52 PM Full Code [id number]  Eve Monzon MD Inpatient    8/5/2015  4:50 AM 8/5/2015  6:39 AM Full Code [id number]  Eve Monzon MD Inpatient    7/30/2015  1:24 AM 8/4/2015  4:32 PM Full Code [id number]  Eve Monzon MD Inpatient    11/22/2013  8:38 PM 11/27/2013 12:57 PM Full Code [id number]  Nicolette Barrios RN Inpatient    7/15/2012  6:24 AM 7/19/2012  3:16 PM Full Code [id number]  Elaine iKnney MD Inpatient    6/28/2012 12:13 PM 7/9/2012 12:54 PM Full Code [id number]  Elaine Kinney MD ED    6/22/2012  8:30 PM 6/27/2012  3:38 PM Full Code [id number]  Jacki Summers RN Inpatient         Medication Review      START taking        Dose / Directions Comments    vancomycin 1,250 mg   Indication:  Bacteria in the Blood   Used for:  Bacteremia        Dose:  1250 mg   Inject 1,250 mg into the vein every 8 hours for 10 days Through 6/16/18. Consider retiming doses to 0600, 1400, 2200 to avoid middle of the night dosing (current schedule 0200, 1000, 1800). Can achieve this by shifting doses up to 0800, 1600, 2400 for one day (6/7/18) then to goal schedule 0600, 1400, 2200 the following day (6/8/18).   Refills:  0          CONTINUE these medications which may have CHANGED, or have new prescriptions. If we are  "uncertain of the size of tablets/capsules you have at home, strength may be listed as something that might have changed.        Dose / Directions Comments    amphetamine-dextroamphetamine 20 MG per tablet   Commonly known as:  ADDERALL   This may have changed:  Another medication with the same name was removed. Continue taking this medication, and follow the directions you see here.   Used for:  ADHD (attention deficit hyperactivity disorder), inattentive type        Dose:  20 mg   Start taking on:  7/6/2018   Take 1 tablet (20 mg) by mouth daily   Quantity:  30 tablet   Refills:  0        * DiphenhydrAMINE HCl 12.5 MG Chew   This may have changed:    - how much to take  - when to take this  - reasons to take this  - additional instructions   Used for:  Bacteremia        Dose:  25 mg   Take 25 mg by mouth 3 times daily Prior to vancomycin infusions   Quantity:  112 tablet   Refills:  0        * diphenhydrAMINE 12.5 MG/5ML solution   Commonly known as:  BENADRYL   This may have changed:  You were already taking a medication with the same name, and this prescription was added. Make sure you understand how and when to take each.   Used for:  Bacteremia        Dose:  25 mg   Take 10 mLs (25 mg) by mouth every 8 hours for 10 days   Quantity:  300 mL   Refills:  0        [facility]   This may have changed:  Another medication with the same name was removed. Continue taking this medication, and follow the directions you see here.   Used for:  S/P bariatric surgery        Contact [facility] for patient specific medication information at [phone] on admission and discharge from the hospital.  Phones are answered 24 hours a day 7 days a week 365 days a year.  Providers - Choose \"CONTINUE HOME MED (no script)\" at discharge if patient treatment with home infusion will continue.   Refills:  0    Resume prior to admission TPN/Lipids/saline       lidocaine 5 % Patch   Commonly known " as:  LIDODERM   This may have changed:  additional instructions   Used for:  Chronic bilateral low back pain without sciatica, Chronic abdominal pain, Myofascial pain        Dose:  1-2 patch   Place 1-2 patches onto the skin every 24 hours Wear for 12 hours, remove for 12 hours.  OK to cut to better fit to size.   Quantity:  60 patch   Refills:  6        parenteral nutrition - PTA/DISCHARGE ORDER   This may have changed:  additional instructions        [facility] to resume TPN and adjust appropriately with the knowledge that he has been off TPN x 5 days here. No adjustments were made here.   Refills:  0        sucralfate 1 GM/10ML suspension   Commonly known as:  CARAFATE   This may have changed:    - when to take this  - reasons to take this   Used for:  Nausea        Dose:  1 g   Take 10 mLs (1 g) by mouth 4 times daily   Quantity:  1200 mL   Refills:  11        vitamin D 49942 UNIT capsule   Commonly known as:  ERGOCALCIFEROL   This may have changed:  additional instructions   Used for:  Vitamin D deficiency        Dose:  77689 Units   Take 1 capsule (50,000 Units) by mouth every 7 days   Quantity:  12 capsule   Refills:  3        * Notice:  This list has 2 medication(s) that are the same as other medications prescribed for you. Read the directions carefully, and ask your doctor or other care provider to review them with you.      CONTINUE these medications which have NOT CHANGED        Dose / Directions Comments    acetaminophen 500 MG tablet   Commonly known as:  TYLENOL        Dose:  1000 mg   Take 1,000 mg by mouth every 6 hours as needed for fever   Refills:  0        albuterol 108 (90 Base) MCG/ACT Inhaler   Commonly known as:  PROAIR HFA/PROVENTIL HFA/VENTOLIN HFA   Used for:  Productive cough        Dose:  2 puff   Inhale 2 puffs into the lungs every 4 hours as needed for shortness of breath / dyspnea or wheezing   Quantity:  1 Inhaler   Refills:  3        COREG 12.5 MG tablet   Generic drug:  carvedilol         "Dose:  12.5 mg   Take 12.5 mg by mouth 2 times daily (with meals)   Refills:  0        cyanocobalamin 1000 MCG/ML injection   Commonly known as:  VITAMIN B12   Used for:  Bariatric surgery status        Dose:  1 mL   Inject 1 mL (1,000 mcg) into the muscle every 30 days   Quantity:  1 mL   Refills:  11        * fentaNYL 12 mcg/hr 72 hr patch   Commonly known as:  DURAGESIC   Used for:  Chronic abdominal pain, Encounter for long-term opiate analgesic use        Dose:  1 patch   Place 1 patch onto the skin every 48 hours . Remove old patch.  Fill on/after 6/22 to start on/after 6/24   Quantity:  15 patch   Refills:  0        * fentaNYL 25 mcg/hr 72 hr patch   Commonly known as:  DURAGESIC   Used for:  Chronic abdominal pain, Encounter for long-term opiate analgesic use        Dose:  1 patch   Place 1 patch onto the skin every 48 hours remove old patch.  Release on/after 6/22/18 to start on/after 6/24/18   Quantity:  15 patch   Refills:  0        naloxone nasal spray   Commonly known as:  NARCAN   Used for:  Encounter for long-term opiate analgesic use, Chronic abdominal pain        Dose:  4 mg   Spray 1 spray (4 mg) into one nostril alternating nostrils as needed for opioid reversal (every 2-3 minutes until assistance arrives.)   Quantity:  0.2 mL   Refills:  0        Needle (Disp) 27G X 1/2\" Misc   Commonly known as:  BD DISP NEEDLES   Used for:  Bariatric surgery status        Dose:  1 Device   1 Device every 30 days Use for cyanocobalamin injection once q 30 days.   Quantity:  3 each   Refills:  4        ondansetron 8 MG ODT tab   Commonly known as:  ZOFRAN-ODT   Used for:  Nausea        Dose:  8 mg   Take 1 tablet (8 mg) by mouth every 8 hours as needed for nausea   Quantity:  90 tablet   Refills:  3        * order for DME   Used for:  Bariatric surgery status        Injection Supplies for Vitamin B12: 3cc syringes w/ 27 gauge needles, 1/2 inch length   Quantity:  12 each   Refills:  0        * order for DME "   Used for:  Bilateral edema of lower extremity        Equipment being ordered: Bilateral knee high chronic venous insufficiency stockings--  mild-moderate pressures.   Quantity:  4 each   Refills:  5        oxyCODONE 5 MG/5ML solution   Commonly known as:  ROXICODONE   Used for:  Encounter for long-term opiate analgesic use, Chronic abdominal pain        Dose:  10-15 mg   Take 10-15 mLs (10-15 mg) by mouth every 4 hours as needed for moderate to severe pain Max of 55 mg/day this month. Wean as able Fill on/after 6/8/18 not to start untill 6/11/18. Early fill for travel.   Quantity:  1650 mL   Refills:  0        * Notice:  This list has 4 medication(s) that are the same as other medications prescribed for you. Read the directions carefully, and ask your doctor or other care provider to review them with you.      STOP taking     mupirocin 2 % ointment   Commonly known as:  BACTROBAN           nystatin-triamcinolone cream   Commonly known as:  MYCOLOG II                   Summary of Visit     Reason for your hospital stay       Admitted with bacteremia due to line infection             After Care     Activity       Your activity upon discharge: activity as tolerated       Diet       Follow this diet upon discharge: full liquid diet       IV access       You are going home with the following vascular access device: PICC.       Tubes and drains       You are going home with the following tubes or drains: feeding tube G-Tube. Tube cares per hospital or home care instructions. Consider continuous venting overnight.             Referrals     Home infusion referral       Your provider has referred you to: [facility]: [facility] [place] [phone]   [email or web address]    Local Address (if different from home address): N/A    Anticipated Length of Therapy: through June 16/18 for antibiotic    Vancomycin 1250 mg IV every 8 hours    Home Infusion Pharmacist to adjust therapy  based on labs and clinical assessments: Yes    Labs:  May draw labs from Venous Catheter: Yes  Home Infusion Pharmacist to order labs based on therapy type and clinical assessments: Yes  Call/Fax Lab Results to: Infectious Disease, Dr. Townsend    Agency Staff to assess nursing needs for Infusion Therapy.    Access Device Management:  IV Access Type: PICC  Flush with Heparin and Normal Saline IVP PRN and routine site care (per agency protocol) to maintain access device? Yes    Continue previous orders for  TPN and supplies       Medication Therapy Management Referral       MTM referral reason            Patient had a hospital or ED visit in last 6 months and has more than 10   PTA or Discharge medications       This service is designed to help you get the most from your medications.  A specially trained pharmacist will work closely with you and your doctors  to solve any problems related to your medications and to help you get the   best results from taking them.      The Medication Therapy Management staff will call you to schedule an appointment.             Your next 10 appointments already scheduled     Jun 21, 2018 10:00 AM CDT   (Arrive by 9:45 AM)   Return Visit with Gabbi Thompson MD   [facility] and Infectious Diseases ([facility] [place])    [address]  [address]  [facility] [address] [phone]            Jun 21, 2018 11:20 AM CDT   (Arrive by 11:05 AM)   RETURN BARIATRIC SURGERY with Francis Vyas MD   [facility] ([facility] [place])    [address]  [address]  [facility] [id number] [phone]            Jun 27, 2018  1:00 PM CDT   Return Visit with Patti Milligan MD   [facility] ([facility])    [address] [id number] [phone]              Follow-Up Appointment Instructions     Future Labs/Procedures     Follow Up and recommended labs and tests     Comments:    Twice weekly creatinine. Next vancomycin trough Friday 6/8 before 2 pm dose. See PCP next 7 days for hospital follow up. ID clinic follow up 6/21 to reassess timeframe for Cm replacement. Also have visit with Dr Vyas that day and Dr. Milligan on 6/27.      Follow-Up Appointment Instructions     Follow Up and recommended labs and tests       Twice weekly creatinine. Next vancomycin trough Friday 6/8 before 2 pm dose. See PCP next 7 days for hospital follow up. ID clinic follow up 6/21 to reassess timeframe for Cm replacement. Also have visit with Dr Vyas that day and Dr. Milligan on 6/27.             Statement of Approval     Ordered          06/06/18 1124  I have reviewed and agree with all the recommendations and orders detailed in this document.  EFFECTIVE NOW     Approved and electronically signed by:  Leana Rosales PA

## 2018-06-01 NOTE — IP AVS SNAPSHOT
[address] [address]    Phone:  [phone]                                       After Visit Summary   6/1/2018    Parker Acevedo Sr    MRN: [id number]           After Visit Summary Signature Page     I have received my discharge instructions, and my questions have been answered. I have discussed any challenges I see with this plan with the nurse or doctor.    ..........................................................................................................................................  Patient/Patient Representative Signature      ..........................................................................................................................................  Patient Representative Print Name and Relationship to Patient    ..................................................               ................................................  Date                                            Time    ..........................................................................................................................................  Reviewed by Signature/Title    ...................................................              ..............................................  Date                                                            Time

## 2018-06-01 NOTE — LETTER
Transition Communication Hand-off for Care Transitions to Next Level of Care Provider    Name: Parker Acevedo Sr  : 1970  MRN #: [id number]  Primary Care Provider: Esteban Daly     Primary Clinic: [id number] [place] [address]     Reason for Hospitalization:  Bacteremia [R78.81]  Admit Date/Time: 2018  5:52 PM  Discharge Date: 18  Payor Source: Payor: [employer] / Plan: [employer] MEDICARE ADVANTAGE / Product Type: Medicare /     Readmission Assessment Measure (WILBERT) Risk Score/category: Elevated    Reason for Communication Hand-off Referral: Multiple providers/specialties    Discharge Plan: home with IV antibiotic and TPN via PICC line       Concern for non-adherence with plan of care:   Y/N yes  Discharge Needs Assessment:  Needs       Most Recent Value    Anticipated Changes Related to Illness none    Equipment Currently Used at Home cane, straight    Home Infusion Provider [facility] [phone], Fax: [phone]          Already enrolled in Tele-monitoring program and name of program:  no  Follow-up specialty is recommended: Yes    Follow-up plan:  Future Appointments  Date Time Provider Department Center   2018 10:00 AM Gabbi Thompson MD [facility]   2018 11:20 AM Francis Vyas MD [facility]   2018 1:00 PM Patti Milligan MD [facility]       Any outstanding tests or procedures:        Referrals     Future Labs/Procedures    Home infusion referral     Comments:    Your provider has referred you to: [facility]: [facility] [phone]   [email or web address]    Local Address (if different from home address): N/A    Anticipated Length of Therapy: through  for antibiotic    Vancomycin 1250 mg IV every 8 hours    Home Infusion Pharmacist to adjust therapy based on labs and clinical assessments: Yes    Labs:  May draw labs from Venous Catheter:  Yes  Home Infusion Pharmacist to order labs based on therapy type and clinical assessments: Yes  Call/Fax Lab Results to: Infectious Disease, Dr. Townsend    Agency Staff to assess nursing needs for Infusion Therapy.    Access Device Management:  IV Access Type: PICC  Flush with Heparin and Normal Saline IVP PRN and routine site care (per agency protocol) to maintain access device? Yes    Continue previous orders for  TPN and supplies    Medication Therapy Management Referral     Comments:    MTM referral reason            Patient had a hospital or ED visit in last 6 months and has more than 10   PTA or Discharge medications       This service is designed to help you get the most from your medications.  A specially trained pharmacist will work closely with you and your doctors  to solve any problems related to your medications and to help you get the   best results from taking them.      The Medication Therapy Management staff will call you to schedule an appointment.            Key Recommendations:  See HARDEEP Caban RN, BSN  Care Coordinator Dez Mendoza [address] [facility] [address]  Pager: [phone]  Phone: [phone]    AVS/Discharge Summary is the source of truth; this is a helpful guide for improved communication of patient story

## 2018-06-01 NOTE — PROGRESS NOTES
This is a recent snapshot of the patient's [facility] medical record.  For current drug dose and complete information and questions, call [phone] or In Basket pool, [facility] home infusion ([id number])  CSN Number:  [id number]

## 2018-06-02 LAB
ANION GAP SERPL CALCULATED.3IONS-SCNC: 7 MMOL/L (ref 3–14)
BACTERIA SPEC CULT: NORMAL
BUN SERPL-MCNC: 5 MG/DL (ref 7–30)
CA-I BLD-MCNC: 4.5 MG/DL (ref 4.4–5.2)
CALCIUM SERPL-MCNC: 8.1 MG/DL (ref 8.5–10.1)
CHLORIDE SERPL-SCNC: 109 MMOL/L (ref 94–109)
CO2 SERPL-SCNC: 29 MMOL/L (ref 20–32)
CREAT SERPL-MCNC: 0.64 MG/DL (ref 0.66–1.25)
CRP SERPL-MCNC: 30 MG/L (ref 0–8)
ERYTHROCYTE [DISTWIDTH] IN BLOOD BY AUTOMATED COUNT: 13.2 % (ref 10–15)
GFR SERPL CREATININE-BSD FRML MDRD: >90 ML/MIN/1.7M2
GLUCOSE SERPL-MCNC: 92 MG/DL (ref 70–99)
HCT VFR BLD AUTO: 39.7 % (ref 40–53)
HGB BLD-MCNC: 12.7 G/DL (ref 13.3–17.7)
LACTATE BLD-SCNC: 0.9 MMOL/L (ref 0.7–2)
MCH RBC QN AUTO: 30.8 PG (ref 26.5–33)
MCHC RBC AUTO-ENTMCNC: 32 G/DL (ref 31.5–36.5)
MCV RBC AUTO: 96 FL (ref 78–100)
PLATELET # BLD AUTO: 97 10E9/L (ref 150–450)
POTASSIUM SERPL-SCNC: 3.3 MMOL/L (ref 3.4–5.3)
RBC # BLD AUTO: 4.13 10E12/L (ref 4.4–5.9)
SODIUM SERPL-SCNC: 144 MMOL/L (ref 133–144)
SPECIMEN SOURCE: NORMAL
SPECIMEN SOURCE: NORMAL
WBC # BLD AUTO: 5.1 10E9/L (ref 4–11)

## 2018-06-02 PROCEDURE — 87040 BLOOD CULTURE FOR BACTERIA: CPT | Performed by: PHYSICIAN ASSISTANT

## 2018-06-02 PROCEDURE — 25000125 ZZHC RX 250: Performed by: PHYSICIAN ASSISTANT

## 2018-06-02 PROCEDURE — 80048 BASIC METABOLIC PNL TOTAL CA: CPT | Performed by: PHYSICIAN ASSISTANT

## 2018-06-02 PROCEDURE — 82330 ASSAY OF CALCIUM: CPT | Performed by: INTERNAL MEDICINE

## 2018-06-02 PROCEDURE — 36415 COLL VENOUS BLD VENIPUNCTURE: CPT | Performed by: PHYSICIAN ASSISTANT

## 2018-06-02 PROCEDURE — 12000001 ZZH R&B MED SURG/OB UMMC

## 2018-06-02 PROCEDURE — 99233 SBSQ HOSP IP/OBS HIGH 50: CPT | Performed by: NURSE PRACTITIONER

## 2018-06-02 PROCEDURE — 85027 COMPLETE CBC AUTOMATED: CPT | Performed by: PHYSICIAN ASSISTANT

## 2018-06-02 PROCEDURE — 25000132 ZZH RX MED GY IP 250 OP 250 PS 637: Performed by: INTERNAL MEDICINE

## 2018-06-02 PROCEDURE — 25000132 ZZH RX MED GY IP 250 OP 250 PS 637: Performed by: NURSE PRACTITIONER

## 2018-06-02 PROCEDURE — 83605 ASSAY OF LACTIC ACID: CPT | Performed by: INTERNAL MEDICINE

## 2018-06-02 PROCEDURE — 40000893 ZZH STATISTIC PT IP EVAL DEFER

## 2018-06-02 PROCEDURE — 25000132 ZZH RX MED GY IP 250 OP 250 PS 637: Performed by: PHYSICIAN ASSISTANT

## 2018-06-02 PROCEDURE — 25000128 H RX IP 250 OP 636: Performed by: EMERGENCY MEDICINE

## 2018-06-02 PROCEDURE — 25000132 ZZH RX MED GY IP 250 OP 250 PS 637: Performed by: EMERGENCY MEDICINE

## 2018-06-02 PROCEDURE — 86140 C-REACTIVE PROTEIN: CPT | Performed by: PHYSICIAN ASSISTANT

## 2018-06-02 PROCEDURE — 25800025 ZZH RX 258: Performed by: PHYSICIAN ASSISTANT

## 2018-06-02 RX ORDER — POTASSIUM CHLORIDE 750 MG/1
20 TABLET, EXTENDED RELEASE ORAL ONCE
Status: COMPLETED | OUTPATIENT
Start: 2018-06-02 | End: 2018-06-02

## 2018-06-02 RX ORDER — CARVEDILOL 12.5 MG/1
12.5 TABLET ORAL 2 TIMES DAILY WITH MEALS
Status: DISCONTINUED | OUTPATIENT
Start: 2018-06-03 | End: 2018-06-06 | Stop reason: HOSPADM

## 2018-06-02 RX ORDER — DEXTROAMPHETAMINE SACCHARATE, AMPHETAMINE ASPARTATE, DEXTROAMPHETAMINE SULFATE AND AMPHETAMINE SULFATE 2.5; 2.5; 2.5; 2.5 MG/1; MG/1; MG/1; MG/1
20 TABLET ORAL DAILY
Status: DISCONTINUED | OUTPATIENT
Start: 2018-06-02 | End: 2018-06-06 | Stop reason: HOSPADM

## 2018-06-02 RX ORDER — DIPHENHYDRAMINE HCL 25 MG
25 CAPSULE ORAL ONCE
Status: COMPLETED | OUTPATIENT
Start: 2018-06-02 | End: 2018-06-02

## 2018-06-02 RX ADMIN — VANCOMYCIN HYDROCHLORIDE 2000 MG: 1 INJECTION, POWDER, LYOPHILIZED, FOR SOLUTION INTRAVENOUS at 00:10

## 2018-06-02 RX ADMIN — POTASSIUM CHLORIDE 20 MEQ: 750 TABLET, EXTENDED RELEASE ORAL at 10:35

## 2018-06-02 RX ADMIN — SUCRALFATE 1 G: 1 SUSPENSION ORAL at 23:08

## 2018-06-02 RX ADMIN — OXYCODONE HYDROCHLORIDE 15 MG: 5 SOLUTION ORAL at 02:43

## 2018-06-02 RX ADMIN — OXYCODONE HYDROCHLORIDE 15 MG: 5 SOLUTION ORAL at 08:53

## 2018-06-02 RX ADMIN — OXYCODONE HYDROCHLORIDE 15 MG: 5 SOLUTION ORAL at 16:34

## 2018-06-02 RX ADMIN — ONDANSETRON 4 MG: 4 TABLET, ORALLY DISINTEGRATING ORAL at 08:53

## 2018-06-02 RX ADMIN — SUCRALFATE 1 G: 1 SUSPENSION ORAL at 07:16

## 2018-06-02 RX ADMIN — DIPHENHYDRAMINE HYDROCHLORIDE 25 MG: 25 CAPSULE ORAL at 02:57

## 2018-06-02 RX ADMIN — SUCRALFATE 1 G: 1 SUSPENSION ORAL at 16:34

## 2018-06-02 RX ADMIN — ONDANSETRON 4 MG: 4 TABLET, ORALLY DISINTEGRATING ORAL at 16:33

## 2018-06-02 RX ADMIN — ONDANSETRON 4 MG: 4 TABLET, ORALLY DISINTEGRATING ORAL at 23:09

## 2018-06-02 RX ADMIN — SUCRALFATE 1 G: 1 SUSPENSION ORAL at 11:41

## 2018-06-02 RX ADMIN — DEXTROSE AND SODIUM CHLORIDE: 5; 450 INJECTION, SOLUTION INTRAVENOUS at 12:09

## 2018-06-02 RX ADMIN — VANCOMYCIN HYDROCHLORIDE 1750 MG: 10 INJECTION, POWDER, LYOPHILIZED, FOR SOLUTION INTRAVENOUS at 12:09

## 2018-06-02 RX ADMIN — DEXTROAMPHETAMINE SACCHARATE, AMPHETAMINE ASPARTATE, DEXTROAMPHETAMINE SULFATE AND AMPHETAMINE SULFATE 20 MG: 2.5; 2.5; 2.5; 2.5 TABLET ORAL at 10:34

## 2018-06-02 RX ADMIN — DIPHENHYDRAMINE HYDROCHLORIDE 25 MG: 25 SOLUTION ORAL at 23:45

## 2018-06-02 RX ADMIN — OXYCODONE HYDROCHLORIDE 15 MG: 5 SOLUTION ORAL at 23:09

## 2018-06-02 RX ADMIN — DIPHENHYDRAMINE HYDROCHLORIDE 25 MG: 25 SOLUTION ORAL at 11:42

## 2018-06-02 NOTE — PROVIDER NOTIFICATION
Text paged Ronnie Looney MD regarding blood culture on 6/1 ( Cm ) growing gram + cocci in pairs and chains, Dr Garza responded

## 2018-06-02 NOTE — CONSULTS
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 This is non-urgent  - penicillin allergy skin testing consult    DISCUSSION:   47 year old male with PMH of PMH of abdominal surgeries with short gut syndrome on chronic TPN via Azevedo catheter, hx recurrent bacteremia/line infections admitted on  with enterococcus faecalis azevedo infection. From an infectious standpoint, best way to cure infection is to remove his line but understand that he's already gone through several lines due to infection. Should he want to try and keep his line, would add vancomycin lock in addition to the IV vancomycin. Enterococcus line infections can sometimes be difficult to eradicate. The ideal treatment for a sensitive enterococcus is ampicillin however he has a childhood reaction to PCN. Recommend a PCN allergy skin testing consult to see if he can tolerate PCN. This could be helpful for treating his current enterococcus or future infections. Since he's had multiple positive blood cultures,  including peripheral, recommend a TTE to rule out endocarditis.     Patient seen and discussed with Infectious Diseases Staff, Dr. Buckner.  ID will continue to follow.    Taylor Cedeño MD  [facility] ID Fellow  [phone]    HPI:   Mr Acevedo is a 47 year old male with PMH of abdominal surgeries with short gut syndrome on chronic TPN via Azevedo catheter, hx recurrent bacteremia/line infections, and PUD admitted on 6/1 after outpatient positive blood cultures. Around 4 days ago febrile to 102 with chills, nausea, non bloody vomiting, and muscles aches. These are consistent with prior symptoms of bacteremia. He's not sure but thinks he could have been having symptoms for a bit longer. In January, hospitalized for e coli port a cath infection and had enterococcus bacteremia at the same time. Port removed, given 14 days of vancomycin and ceftriaxone and a azevedo catheter was placed. Recently was told that he doesn't need to practice sterile techniques and so has not been washing hands, etc when accessing his line. He is feeling a little better after IV antibiotics, no fevers but continues to feel a little fatigued. He's unsure if he wants to treat through his line infection or wants to have his line removed.     ANTI-INFECTIVES:   - vancomycin IV, dosing per protocol    ROS:  A ten point review of systems was obtained and was negative with the exception of that which is described in the HPI.    PMH:   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)      Chronic abdominal pain      Difficulty swallowing      Gastric ulcer, unspecified as acute or chronic, without mention of hemorrhage, perforation, or obstruction      Gastro-oesophageal reflux disease      Generalized weakness 1/30/2018     Head injury       Hiatal hernia      Other bladder disorder      Other chronic pain      Severe malnutrition (H)     TPN     Short gut syndrome      Tobacco abuse      Past Surgical History:   Procedure Laterality Date     AMPUTATION       APPENDECTOMY       BACK SURGERY  11/3/2014    curve in the spine     BIOPSY LYMPH NODE CERVICAL N/A 2/20/2015    Procedure: BIOPSY LYMPH NODE CERVICAL;  Surgeon: Baron Scanlon MD;  Location: [facility] OR     C GASTRIC BYPASS,OBESE<100CM SHAYLEE-EN-Y  2002    lost 300 pounds     CHOLECYSTECTOMY       DISCECTOMY, FUSION CERVICAL ANTERIOR ONE LEVEL, COMBINED N/A 2/15/2017    Procedure: COMBINED DISCECTOMY, FUSION CERVICAL ANTERIOR ONE LEVEL;  Surgeon: Darren Campos MD;  Location: [facility] OR     ENDOSCOPIC INSERTION TUBE GASTROSTOMY  9/9/2013    Procedure: ENDOSCOPIC INSERTION TUBE GASTROSTOMY;;  Surgeon: Francis Vyas MD;  [facility]     ENDOSCOPIC ULTRASOUND UPPER GASTROINTESTINAL TRACT (GI)  4/29/2011    Procedure:ENDOSCOPIC ULTRASOUND UPPER GASTROINTESTINAL TRACT (GI); Both Procedures done Conjointly; Surgeon:NEREIDA HOUSER; [facility]     ENDOSCOPIC ULTRASOUND UPPER GASTROINTESTINAL TRACT (GI)  9/9/2013    Procedure: ENDOSCOPIC ULTRASOUND UPPER GASTROINTESTINAL TRACT (GI);  Endoscopic Ultrasound Guide Gastrostomy Tube Placement  C-arm;  Surgeon: Noe Lizarraga MD;  [facility] OR     ENDOSCOPY  03/25/11    EGD, [place] [facility]     ENDOSCOPY  08/04/09    Upper Endoscopy, [facility]     ENDOSCOPY  01/05/09    Upper Endoscopy, [facility]     ESOPHAGOSCOPY, GASTROSCOPY, DUODENOSCOPY (EGD), COMBINED  4/20/2011    Procedure:COMBINED ESOPHAGOSCOPY, GASTROSCOPY, DUODENOSCOPY (EGD); Surgeon:FRANCIS VYAS; [facility]     ESOPHAGOSCOPY, GASTROSCOPY, DUODENOSCOPY (EGD), COMBINED  6/15/2011    Procedure:COMBINED ESOPHAGOSCOPY, GASTROSCOPY, DUODENOSCOPY (EGD); Surgeon:FRANCIS VYAS; [facility]     ESOPHAGOSCOPY, GASTROSCOPY, DUODENOSCOPY (EGD),  COMBINED  6/12/2013    Procedure: COMBINED ESOPHAGOSCOPY, GASTROSCOPY, DUODENOSCOPY (EGD);;  Surgeon: Francis Vyas MD;  Location:  GI     ESOPHAGOSCOPY, GASTROSCOPY, DUODENOSCOPY (EGD), COMBINED  11/22/2013    Procedure: COMBINED ESOPHAGOSCOPY, GASTROSCOPY, DUODENOSCOPY (EGD);;  Surgeon: Francis Vyas MD;  Location:  OR     ESOPHAGOSCOPY, GASTROSCOPY, DUODENOSCOPY (EGD), COMBINED  4/30/2014    Procedure: COMBINED ESOPHAGOSCOPY, GASTROSCOPY, DUODENOSCOPY (EGD);  Surgeon: Francis Vyas MD;  Location:  GI     ESOPHAGOSCOPY, GASTROSCOPY, DUODENOSCOPY (EGD), COMBINED N/A 2/20/2015    Procedure: COMBINED ESOPHAGOSCOPY, GASTROSCOPY, DUODENOSCOPY (EGD), BIOPSY SINGLE OR MULTIPLE;  Surgeon: Baron Scanlon MD;  Location:  OR     ESOPHAGOSCOPY, GASTROSCOPY, DUODENOSCOPY (EGD), COMBINED N/A 9/30/2015    Procedure: COMBINED ESOPHAGOSCOPY, GASTROSCOPY, DUODENOSCOPY (EGD);  Surgeon: Francis Vyas MD;  Location:  GI     GASTRECTOMY  6/22/2012    Procedure: GASTRECTOMY;  Open Approach, Excise Ulcers,Partial Gastrectomy, Esophagojejunostomy, Hiatal Hernia Repair, Extensive Lysis of Adhesions and Esaphagogastrodudenoscopy.;  Surgeon: Francis Vyas MD;  Location:  OR     GASTROJEJUNOSTOMY  08/26/09    Extensice enterolysis, partial resect. jejunum, part. resect gastric pouch, gastrojejunostomy anastomosis     HC ESOPH/GAS REFLUX TEST W NASAL IMPED ELECTRODE  8/5/2013    Procedure: ESOPHAGEAL IMPEDENCE FUNCTION TEST 1 HOUR OR LESS;  Surgeon: Halie Lang MD;  Location:  GI     HEAD & NECK SURGERY  2/15/2017    C5-C6     HERNIA REPAIR  2006    Umbilical hernia     HERNIORRHAPHY HIATAL  6/22/2012    Procedure: HERNIORRHAPHY HIATAL;;  Surgeon: Francis Vyas MD;  Location:  OR     HERNIORRHAPHY INGUINAL  11/22/2013    Procedure: HERNIORRHAPHY INGUINAL;;  Surgeon: Francis Vyas MD;  [facility]     INSERT PORT VASCULAR ACCESS Right 12/19/2017     Procedure: INSERT PORT VASCULAR ACCESS;  Right Chest Port Placement ;  Surgeon: Lisandro Alejandro PA-C;  [facility]     LAPAROTOMY EXPLORATORY  11/22/2013    Procedure: LAPAROTOMY EXPLORATORY;  Exploratory Laparotomy, Upper Endoscopy, Left Inguinal Hernia Repair;  Surgeon: Francis Vyas MD;  [facility]: [facility] OR     ORTHOPEDIC SURGERY       PICC INSERTION Right 03/16/2017    5fr DL BioFlo PICC, 42cm (3cm external) in the R medial brachial vein w/ tip in the SVC RA junction.     PICC INSERTION Left 09/23/2017    5fr DL BioFlo PICC, 45cm (1cm external) in the L basilic vein w/ tip in the SVC RA junction.     SHAYLEE EN Y BOWEL  2003     SOFT TISSUE SURGERY       SOFT TISSUE SURGERY       THORACIC SURGERY       TONSILLECTOMY         MEDICATIONS: Reviewed in chart. Notable allergies include: bactrim (rash), PCN (anaphylaxis at age 13), doxycyline (rash), vancomycin (rash)    SOCIAL HISTORY AND RISK FACTORS   Social History   Substance Use Topics     Smoking status: Light Tobacco Smoker     Packs/day: 0.10     Years: 3.00     Types: Cigarettes     Smokeless tobacco: Current User      Comment: I use an e cig every now and than     Alcohol use No      Comment: quit      History   Sexual Activity     Sexual activity: Yes     Partners: Female     Birth control/ protection: None     Comment: no protection       FAMILY HISTORY:   Reviewed and non-contributory  Family History   Problem Relation Age of Onset     GASTROINTESTINAL DISEASE Mother      Crohns disease     Anxiety Disorder Mother      Thyroid Disease Mother      Grave's disease     CANCER Father      ear cancer-skin cancer/melanoma     Breast Cancer Maternal Grandmother      Anxiety Disorder Sister      DIABETES Maternal Uncle      Breast Cancer Other      Hypertension No family hx of      Hyperlipidemia No family hx of      CEREBROVASCULAR DISEASE No family hx of      Prostate Cancer No family hx of      Depression No family hx of      Anesthesia Reaction No  family hx of      Asthma No family hx of      OSTEOPOROSIS No family hx of      Genetic Disorder No family hx of      Obesity No family hx of      MENTAL ILLNESS No family hx of      Substance Abuse No family hx of        EXAMINATION:  /52 (BP Location: Left arm)  Pulse 69  Temp 98.4  F (36.9  C) (Oral)  Resp 16  SpO2 96%  GENERAL:  Well-developed, well-nourished, in bed in no acute distress.   EYES:  Eyes have anicteric sclerae without conjunctival injection.  ENT:  Oropharynx is moist without exudates or ulcers.  NECK:  Supple. No cervical lymphadenopathy.  LUNGS:  Normal respiratory effort. Lung fields are clear to auscultation bilateral.  CARDIOVASCULAR:  Regular rate and rhythm with no murmurs, gallops or rubs.  ABDOMEN:  Normal bowel sounds, soft, nontender. Healed abdominal scars. G tube in place.  SKIN:  No acute rashes. Line(s) are in place without any surrounding erythema or exudate. No stigmata of endocarditis. Right check azevedo c/d/i, no erythema or TTP  NEUROLOGIC:  Grossly nonfocal. Active x4 extremities.  PSYCH: Appropriate affect. Alert and oriented to person, place and time.  CURRENT LINES: JAMES COOPER    RELEVANT DATA:     BASIC LABS   The following basic labs were personally reviewed:    Inflammatory Markers    Recent Labs   Lab Test  06/02/18   0609  06/01/18   1807  02/16/18   1515  02/12/18   1400  01/23/18   0845  01/20/18   1030  01/16/18   0717  01/15/18   0801   09/24/17   1900   06/28/17   2222  03/12/17   0351   11/01/16   1530   04/20/16   1530  04/11/16   1842   SED   --    --    --    --   10  11   --    --    --   31*   --   26*  17*   --   27*   --   34*  11   CRP  30.0*  26.0*  8.2*  <2.9  <2.9  5.4  26.0*  45.0*   < >  26.6*   < >  53.0*  3.4   < >  8.4*   < >  12.3*  80.0*    < > = values in this interval not displayed.       Hematology Studies    Recent Labs   Lab Test  06/02/18   0609  06/01/18   1807  05/23/18   0930  04/25/18   2335  04/23/18   1500  03/30/18   121   02/26/18   1430   WBC  5.1  4.8  5.4  6.3  6.3  6.6  6.0   ANEU   --   3.1  2.8  2.9  3.5  3.8  3.7   AEOS   --   0.1  0.3  0.3  0.2  0.2  0.2   HGB  12.7*  13.2*  12.7*  13.6  13.5  13.3  12.9*   MCV  96  95  97  96  98  99  100   PLT  97*  124*  143*  130*  141*  159  155     Metabolic Studies     Recent Labs   Lab Test  06/02/18   0609  06/01/18   1807  05/23/18   0930  04/25/18   2335  04/23/18   1500   NA  144  145*  145*  143  144   POTASSIUM  3.3*  3.5  3.4  3.4  3.6   CHLORIDE  109  109  109  110*  106   CO2  29  27  30  28  29   BUN  5*  5*  9  10  12   CR  0.64*  0.59*  0.67  0.65*  0.70   GFRESTIMATED  >90  >90  >90  >90  >90       Hepatic Studies    Recent Labs   Lab Test  06/01/18   1807  05/23/18   0930  04/23/18   1500  03/30/18   1215  02/26/18   1430  02/12/18   1400   BILITOTAL  0.4  0.5  0.4  0.3  0.5  0.5   ALKPHOS  86  81  81  79  69  76   ALBUMIN  3.3*  3.3*  3.7  3.4  3.4  3.9   AST  8  12  15  19  11  13   ALT  18  26  27  25  20  33       Thyroid Studies    Recent Labs   Lab Test  11/03/14   1355  05/20/14   1252   TSH  0.96  1.39       MICROBIOLOGY LABS  The following microbiology studies were personally reviewed:  Culture Micro   Date Value Ref Range Status   06/01/2018 No growth after 10 hours  Preliminary   06/01/2018 (A)  Preliminary    Cultured on the 1st day of incubation:  Gram positive cocci in pairs and chains     06/01/2018   Preliminary    Critical Value/Significant Value, preliminary result only, called to and read back by  ROGER MARTIN RN U5A 0455 06.02.18 CF     05/31/2018 (A)  Preliminary    Cultured on the 1st day of incubation:  Enterococcus faecalis  Susceptibility testing done on previous specimen     05/31/2018   Preliminary    Critical Value/Significant Value, preliminary result only, called to and read back by  Savannah Solitario RN from [facility]. 6.1.18 at 0525. GR.     05/31/2018 (A)  Preliminary    Cultured on the 1st day of incubation:  Enterococcus faecalis      05/31/2018   Preliminary    Critical Value/Significant Value, preliminary result only, called to and read back by  Savannah Jarrett, on call RN for [facility]. 6.1.18 at 0237. GR.      05/31/2018   Preliminary    (Note)  POSITIVE for ENTEROCOCCUS FAECALIS and NEGATIVE for Melvin/vanB genes  by Verigene multiplex nucleic acid test. Final identification and  antimicrobial susceptibility testing will be verified by standard  methods.    Specimen tested with Verigene multiplex, gram-positive blood culture  nucleic acid test for the following targets: Staph aureus, Staph  epidermidis, Staph lugdunensis, other Staph species, Enterococcus  faecalis, Enterococcus faecium, Streptococcus species, S. agalactiae,  S. anginosus grp., S. pneumoniae, S. pyogenes, Listeria sp., mecA  (methicillin resistance) and Melvin/B (vancomycin resistance).    Critical Value/Significant Value called to and read back by Savannah Jarrett RN from [facility]. 6.1.18 at 0521. GR.     01/15/2018 <15 colonies   Escherichia coli   (A)  Final   01/14/2018 No growth  Final   01/14/2018 No growth  Final   01/13/2018 (A)  Final    Cultured on the 1st day of incubation:  Enterococcus faecalis  Susceptibility testing done on previous specimen     01/13/2018 (A)  Final    Upon further review of the original slide, no gram negative rods are seen  Previously reported as:  Cultured on the 1st day of incubation:  Gram negative rods  CORRECTED ON:  1.20.18 @1407 AV     01/13/2018   Final    Critical Value/Significant Value, preliminary result only, called to and read back by  Taylor Bhatt RN from [facility]. 1.14.18 at 0111. GR.     01/13/2018   Final    Corrected report called to and read back by  DENNIS Dooley 1.20.18 @1410 AV     01/13/2018 (A)  Final    Cultured on the 1st day of incubation:  Enterococcus faecalis  Susceptibility testing done on previous specimen     01/13/2018   Final    Critical Value/Significant Value, preliminary result only, called to and read back  by  Shorty Montgomery RN at 1953 on 01.13.18 by mp     01/13/2018 (A)  Final    Cultured on the 1st day of incubation:  Staphylococcus hominis     01/13/2018   Final    Critical Value/Significant Value called to and read back by  Kasia Jones RN on 1.16.2018 at 1114. KVO     01/12/2018 (A)  Final    Cultured on the 1st day of incubation:  Escherichia coli  Susceptibility testing done on previous specimen     01/12/2018 (A)  Final    Cultured on the 1st day of incubation:  Enterococcus faecalis     01/12/2018   Final    Critical Value/Significant Value, preliminary result only, called to and read back by  Shorty Gardner RN 7B @ 0836. 01/13/18 01/12/2018   Final    (Note)  POSITIVE for ENTEROCOCCUS FAECALIS and NEGATIVE for Melvin/vanB genes  by Verigene multiplex nucleic acid test. Final identification and  antimicrobial susceptibility testing will be verified by standard  methods.    Specimen tested with Verigene multiplex, gram-positive blood culture  nucleic acid test for the following targets: Staph aureus, Staph  epidermidis, Staph lugdunensis, other Staph species, Enterococcus  faecalis, Enterococcus faecium, Streptococcus species, S. agalactiae,  S. anginosus grp., S. pneumoniae, S. pyogenes, Listeria sp., mecA  (methicillin resistance) and Melvin/B (vancomycin resistance).    Critical Value/Significant Value called to and read back by Shorty Gardner RN 7B @ 1129.cg 01/13/18 01/12/2018 No growth  Final   01/12/2018 No growth  Final   01/12/2018 (A)  Final    Cultured on the 1st day of incubation:  Escherichia coli     01/12/2018   Final    Critical Value/Significant Value, preliminary result only, called to and read back by  Dr Jeet Orozco  in the [facility] at 8:20am 1/12/2018 ()     01/12/2018   Final    (Note)  POSITIVE for E.COLI by Verigene multiplex nucleic acid test. Final  identification and antimicrobial susceptibility testing will be  verified by standard methods. Verigene test will not  distinguish  E.coli from Shigella species including S.dysenteriae, S.flexneri,  S.boydii, and S.sonnei. Specimens containing Shigella species or  E.coli will be reported as Positive for E.coli.    Specimen tested with PolyRemedyigene multiplex, gram-negative blood culture  nucleic acid test for the following targets: Acinetobacter sp.,  Citrobacter sp., Enterobacter sp., Proteus sp., E. coli, K.  pneumoniae/oxytoca, P. aeruginosa, and the following resistance  markers: CTXM, KPC, NDM, VIM, IMP and OXA.    Critical Value/Significant Value called to and read back by Dr. Jeet Orozco at 1042 on 1.12.18.KD     09/24/2017 No growth  Final   09/24/2017 No growth  Final   09/23/2017 No growth  Final   09/23/2017 No growth  Final   09/22/2017 No growth  Final   09/22/2017 No growth  Final   09/22/2017 No growth  Final   09/21/2017 No growth  Final   09/21/2017 (A)  Final    Cultured on the 1st day of incubation:  Streptococcus mitis group  Identification obtained by MALDI-TOF mass spectrometry research use only database. Test   characteristics determined and verified by the [facility]   ([facility]) [place], [place].     09/21/2017   Final    Critical Value/Significant Value, preliminary result only, called to and read back by  Nathaly Yo RN on 9/22/2017 @2011, tk     09/21/2017 (A)  Final    Cultured on the 1st day of incubation:  Streptococcus salivarius  Susceptibility testing done on previous specimen     09/21/2017   Final    Critical Value/Significant Value, preliminary result only, called to and read back by  Karine Meyers RN U5B 0400 09.22.17 CF     09/21/2017 (A)  Final    Cultured on the 1st day of incubation:  Rothia mucilaginosa  Susceptibility testing done on previous specimen     09/21/2017 (A)  Final    Cultured on the 1st day of incubation:  Staphylococcus epidermidis     09/21/2017 No growth  Final   09/21/2017 No growth  Final   09/20/2017 No growth  Final   09/20/2017 No growth  Final    09/19/2017 No growth  Final   09/19/2017 (A)  Preliminary    Cultured on the 2nd day of incubation:  Streptococcus salivarius  Susceptibility testing done on previous specimen     09/19/2017   Preliminary    Critical Value/Significant Value, preliminary result only, called to and read back by  Annel Hirsch RN at 0814 on 9.20.17.KD      09/19/2017 (A)  Preliminary    Cultured on the 2nd day of incubation:  Gram positive cocci in clusters  Susceptibility testing done on previous specimen     09/19/2017 Referred to research section for identification  Preliminary   09/19/2017 (A)  Final    Cultured on the 1st day of incubation:  Streptococcus salivarius     09/19/2017   Final    Critical Value/Significant Value, preliminary result only, called to and read back by  Dr. Jimenez, from [facility] 09.19.17 at 1357. GR.     09/19/2017 (A)  Final    Cultured on the 1st day of incubation:  Streptococcus salivarius  Susceptibility testing done on previous specimen     09/19/2017   Final    Critical Value/Significant Value, preliminary result only, called to and read back by  Dr. Jimenez from [facility]. 09.19.17 at 1041. GR.     09/19/2017 (A)  Final    Cultured on the 1st day of incubation:  Streptococcus mitis group     09/19/2017 (A)  Final    Cultured on the 1st day of incubation:  Rothia mucilaginosa     09/19/2017   Final    Critical Value/Significant Value called to and read back by  KACIE Rose RN on 9.23.17 at 1101. bw     09/19/2017   Final    (Note)  NEGATIVE for the following: Staphylococcus spp., Staph aureus, Staph  epidermidis, Staph lugdunensis, Streptococcus spp., Strep pneumoniae,  Strep pyogenes, Strep agalactiae, Strep anginosus group, Enterococcus  faecalis, Enterococcus faecium, and Listeria spp. by [employer]  multiplex nucleic acid test. Final identification and antimicrobial  susceptibility testing will be verified by standard methods.    Critical Value/Significant Value called to and read back by Dr. Jimenez from  [facility]. 09.19.17 at 1357. GR.     08/01/2017 No growth  Final   06/28/2017 No growth  Final   06/28/2017 No growth  Final   06/26/2017 No growth  Final   06/26/2017 (A)  Final    Cultured on the 1st day of incubation: Streptococcus salivarius group  Cultured on the 1st day of incubation: Staphylococcus epidermidis  Critical Value/Significant Value, preliminary result only, called to and read   back by  Dr. Francis Vyas @1652 6/27/17. ct  Cultured on the 1st day of incubation: Rothia mucilaginosa  (Note)  POSITIVE for STAPHYLOCOCCUS EPIDERMIDIS and POSITIVE for the mecA  gene (resistant to methicillin) by [employer] multiplex nucleic acid  test. Final identification and antimicrobial susceptibility testing  will be verified by standard methods.    Specimen tested with 3D Dataigene multiplex, gram-positive blood culture  nucleic acid test for the following targets: Staph aureus, Staph  epidermidis, Staph lugdunensis, other Staph species, Enterococcus  faecalis, Enterococcus faecium, Streptococcus species, S. agalactiae,  S. anginosus grp., S. pneumoniae, S. pyogenes, Listeria sp., mecA  (methicillin resistance) and Melvin/B (vancomycin resistance).    Critical Value/Significant Value called to and read back by Dr. Vyas @ 1942 6/27/17        04/11/2017 No growth  Final   04/09/2017 No growth  Final   04/09/2017 No growth  Final   03/12/2017 No growth  Final   03/12/2017 No growth  Final   03/08/2017 No growth  Final   03/07/2017 No growth  Final   03/07/2017 No growth  Final   10/30/2016 No growth  Final   10/29/2016 No growth  Final   10/28/2016 No growth  Final   10/27/2016 15 to 50 colonies Staphylococcus epidermidis (A)  Final   10/27/2016 (A)  Final    Cultured on the 1st day of incubation: Staphylococcus epidermidis Susceptibility   testing done on previous specimen  Critical Value/Significant Value, preliminary result only, called to and read   back by Shahrzad Long RN at 0028 10.28.16.DK     10/27/2016 No  growth  Final   10/26/2016 (A)  Final    Cultured on the 1st day of incubation: Staphylococcus epidermidis Susceptibility   testing done on previous specimen  Critical Value/Significant Value called to and read back by Soraida Stafford RN U5A   10/27/16 0136 CF     10/26/2016 (A)  Final    Cultured on the 1st day of incubation: Staphylococcus epidermidis Susceptibility   testing done on previous specimen  Critical Value/Significant Value, preliminary result only, called to and read   back by Karis Nicole RN at 1553 on 10.26.16.KD     10/25/2016 (A)  Final    Cultured on the 1st day of incubation: Staphylococcus epidermidis Susceptibility   testing done on previous specimen  Critical Value/Significant Value, preliminary result only, called to and read   back by Tanya Acosta RN 0258 10/26/16. MS     10/25/2016 (A)  Final    Cultured on the 1st day of incubation: Staphylococcus epidermidis Susceptibility   testing done on previous specimen  Critical Value/Significant Value, preliminary result only, called to and read   back by  Goldie Serna RN on 5A, 10/26/16 @ 1359 lm     10/24/2016 (A)  Final    Cultured on the 1st day of incubation: Staphylococcus epidermidis Susceptibility   testing done on previous specimen  Critical Value/Significant Value, preliminary result only, called to and read   back by  SREE HERNANDES CH RN (5A). 10.25.16 2204 GJS     10/24/2016 (A)  Final    Cultured on the 1st day of incubation: Staphylococcus epidermidis  Critical Value/Significant Value called to and read back by Jyothi Goins RN   [facility] 10/25/16 at 0550 CF  (Note)  POSITIVE for STAPHYLOCOCCUS EPIDERMIDIS and NEGATIVE for the mecA  gene (not resistant to methicillin) by Perminovaigene nucleic acid test.  The mecA gene was not detected. Final identification and  antimicrobial susceptibility testing will be verified by standard  methods.    Specimen tested with Verigene multiplex, gram-positive blood culture  nucleic acid test for the following  targets: Staph aureus, Staph  epidermidis, Staph lugdunensis, other Staph species, Enterococcus  faecalis, Enterococcus faecium, Streptococcus species, S. agalactiae,  S. anginosus grp., S. pneumoniae, S. pyogenes, Listeria sp., mecA  (methicillin resistance) and Melvin/B (vancomycin resistance).    Critical Value/Significant Value called to and read back by Shruthi Salas RN 10.25.16 at 0823am NK       08/10/2016 (A)  Final    Light growth Coagulase negative Staphylococcus  Susceptibility testing not routinely done     08/05/2016 No growth  Final   08/05/2016 No growth  Final   04/15/2016 No growth  Final   04/15/2016 No growth  Final   04/14/2016 No growth  Final   04/13/2016   Final    Canceled, Test credited Test reordered as correct code REORDERED AS A YEAST   CULTURE     04/13/2016 <15 colonies  Candida albicans (A)  Final   04/13/2016 No growth  Final   04/13/2016 No growth  Final   04/12/2016 No growth  Final   04/12/2016 (A)  Final    Cultured on the 4th day of incubation: Candida albicans  Critical Value/Significant Value, preliminary result only, called to and read   back by Dr. Godwin Egan at 0750 on 4.16.16, CN.     04/11/2016 (A)  Final    Cultured on the 2nd day of incubation: Candida albicans  Critical Value/Significant Value, preliminary result only, called to and read   back by Bartolo Oliver RN at 1713 on 4.12.16.KD     04/11/2016 (A)  Final    Cultured on the 2nd day of incubation: Candida albicans  Critical Value/Significant Value, preliminary result only, called to and read   back by Masood Ayala RN 0159 4/13/16. MS  Susceptibility testing done on previous specimen     04/11/2016 No growth  Final   04/10/2016   Final    <10,000 colonies/mL mixed urogenital deepa  Susceptibility testing not routinely done         Urine Studies    Recent Labs   Lab Test  06/01/18   2021  01/12/18   0042  09/19/17   0242  08/01/17   1133  06/28/17   2339   LEUKEST  Negative  Negative  Negative  Negative   Negative   WBCU  <1  <1  2  O - 2  0       Vancomycin Levels    Recent Labs   Lab Test  01/26/18   0900  01/25/18   0905  01/23/18   0845   VANCOMYCIN  9.1  9.7  10.9     Hepatitis B Testing   Recent Labs   Lab Test  09/20/17   0549   HEPBANG  Nonreactive     Hepatitis C Testing     Hepatitis C Antibody   Date Value Ref Range Status   09/20/2017 Nonreactive NR^Nonreactive Final     Comment:     Assay performance characteristics have not been established for newborns,   infants, and children       IMAGING RESULTS  6/1 CXR: No acute cardiopulmonary abnormality. If the patient is immunocompromised and there is concern for infection, airspace infection can be quite subtle in immunocompromised patients and CT would be appropriate.

## 2018-06-02 NOTE — PHARMACY-ADMISSION MEDICATION HISTORY
"Admission medication history interview status for the 6/1/2018 admission is complete. See Epic admission navigator for allergy information, pharmacy, prior to admission medications and immunization status.     Medication history interview sources:  Parker    Changes made to PTA medication list (reason)  Added: Acetaminophen 500 mg tablet  Deleted: Levofloxacin - completed course in early May      Lidocaine-prilocaine cream - no longer using      Morphine gel - no longer using      Multivitamin - replaced w/ TPN      Prednisone tablet - completed course in early May  Changed: Mupirocin and nystatin-triamcinolone - Uses PRN redness, itchiness, and discomfort at port site. Does not use regularly, normally every 4-5 days and alternates which drug he uses (if redder, will use mupirocin)       Sucralfate - Take 10 mL every 4 hours as needed for epigastric pain and \"bile backup\" (not four times daily)       Diphenhydramine - Takes chewable tablets PRN congestion/allergies. Was previously taking liquid 1-2 hours before vancomycin infusions to prevent allergic reaction, but those infusions have since stopped    Additional medication history information (including reliability of information, actions taken by pharmacist):    Parker was pleasant to speak with and an excellent historian of his medication use.    Of note:  - Parker has not had most of his medications today. He still requires Adderall, fentanyl (previous patch placed 5/30, due 6/1, 2 patches in place at a time to total 37 mcg/hr, 2 patches in place currently), and carvedilol.  - Adderall is noted three times in the PTA list because there are 2 future-dated prescriptions.  - The current prescriptions for fentanyl and oxycodone are future-dated, but Parker has current prescriptions on file per his chart review.  - Parker did not receive his cyanocobalamin injection which was due 5/27. His last injection was 4/27.  - Parker states he prefers oxycodone instead of hydromorphone " for PRN pain management.  - Parker's Vitamin D administration days are Sundays.    Allergies and flu shot status confirmed with patient.    Home pharmacy: [facility] in [place]    Prior to Admission medications    Medication Sig Last Dose Taking? Auth Provider   acetaminophen (TYLENOL) 500 MG tablet Take 1,000 mg by mouth every 6 hours as needed for fever 6/1/2018 at 1100 Yes Unknown, Entered By History   albuterol (PROAIR HFA/PROVENTIL HFA/VENTOLIN HFA) 108 (90 Base) MCG/ACT Inhaler Inhale 2 puffs into the lungs every 4 hours as needed for shortness of breath / dyspnea or wheezing Past Week at Unknown time Yes Esteban Daly MD   amphetamine-dextroamphetamine (ADDERALL) 20 MG per tablet Take 1 tablet (20 mg) by mouth daily 5/31/2018 at 1400 Yes Esteban Daly MD   carvedilol (COREG) 12.5 MG tablet Take 12.5 mg by mouth 2 times daily (with meals) 6/1/2018 at AM Yes Unknown, Entered By History   cyanocobalamin (VITAMIN B12) 1000 MCG/ML injection Inject 1 mL (1,000 mcg) into the muscle every 30 days 4/27/18 Yes Esteban Daly MD   DiphenhydrAMINE HCl 12.5 MG CHEW Take 12.5 mg by mouth as needed (STUFFINESS, CONGESTION) Past Week at Unknown time Yes Unknown, Entered By History   mupirocin (BACTROBAN) 2 % ointment Apply topically 3 times daily  Patient taking differently: Apply topically as needed (REDNESS, DISCOMFORT AT CATHETER SITE)  Past Week at Unknown time Yes Esteban Daly MD   nystatin-triamcinolone (MYCOLOG II) cream Apply topically 2 times daily as needed  Patient taking differently: Apply topically 2 times daily as needed (REDNESS, DISCOMFORT AT CATHETER SITE)  Past Week at Unknown time Yes Esteban Daly MD   ondansetron (ZOFRAN-ODT) 8 MG ODT tab Take 1 tablet (8 mg) by mouth every 8 hours as needed for nausea 6/1/2018 at AM Yes Esteban Daly MD   oxyCODONE (ROXICODONE) 5 MG/5ML solution Take 10-15 mLs (10-15 mg) by mouth every 4 hours as needed for moderate to  "severe pain Max of 55 mg/day this month. Wean as able Fill on/after 6/8/18 not to start untill 6/11/18. Early fill for travel. 6/1/2018 at 0300 Yes Patti Milligan MD   parenteral nutrition - PTA/DISCHARGE ORDER [facility]ceives from [facility] 5/31/2018 at Unknown time Yes Unknown, Entered By History   sucralfate (CARAFATE) 1 GM/10ML suspension Take 10 mLs (1 g) by mouth 4 times daily  Patient taking differently: Take 1 g by mouth every 4 hours as needed (EPIGASTRIC PAIN, BILE BACKUP)  6/1/2018 at 0000 Yes Esteban Daly MD   amphetamine-dextroamphetamine (ADDERALL) 20 MG per tablet Take 1 tablet (20 mg) by mouth daily   Esteban Daly MD   amphetamine-dextroamphetamine (ADDERALL) 20 MG per tablet Take 1 tablet (20 mg) by mouth daily   Esteban Daly MD   [place] [facility] MANAGED PATIENT Contact [facility] for patient specific medication information at [phone] on admission and discharge from the hospital.  Phones are answered 24 hours a day 7 days a week 365 days a year.    Providers - Choose \"CONTINUE HOME MED (no script)\" at discharge if patient treatment with home infusion will continue.   Fernandez Mann, BRITTANY   [place] [facility] MANAGED PATIENT Contact [facility] for patient specific medication information at [phone] on admission and discharge from the hospital.  Phones are answered 24 hours a day 7 days a week 365 days a year.    Providers - Choose \"CONTINUE HOME MED (no script)\" at discharge if patient treatment with home infusion will continue.   Ilsa Shine PA   fentaNYL (DURAGESIC) 12 mcg/hr 72 hr patch Place 1 patch onto the skin every 48 hours . Remove old patch.  Fill on/after 6/22 to start on/after 6/24 5/30/2018 at 1200  Patti Milligan MD   fentaNYL (DURAGESIC) 25 mcg/hr 72 hr patch Place 1 patch onto the skin every 48 hours remove old patch.  Release on/after 6/22/18 to start on/after 6/24/18 5/30/2018 at 1200  " "Patti Milligan MD   lidocaine (LIDODERM) 5 % Patch Place 1-2 patches onto the skin every 24 hours Wear for 12 hours, remove for 12 hours.  OK to cut to better fit to size.  Patient taking differently: Place 1-2 patches onto the skin every 24 hours Wear for 12 hours, remove for 12 hours.  OK to cut to better fit to size. PRN for pinched nerve. More than a month at Unknown time  Patti Milligan MD   naloxone (NARCAN) nasal spray Spray 1 spray (4 mg) into one nostril alternating nostrils as needed for opioid reversal (every 2-3 minutes until assistance arrives.)   Patti Milligan MD   Needle, Disp, (BD DISP NEEDLES) 27G X 1/2\" MISC 1 Device every 30 days Use for cyanocobalamin injection once q 30 days.   Esteban Daly MD   order for DME Equipment being ordered: Bilateral knee high chronic venous insufficiency stockings--  mild-moderate pressures.   Esteban Daly MD   order for DME Injection Supplies for Vitamin B12: 3cc syringes w/ 27 gauge needles, 1/2 inch length   Anita Méndez PA-C   vitamin D (ERGOCALCIFEROL) 50300 UNIT capsule Take 1 capsule (50,000 Units) by mouth every 7 days 5/27/2018 at 0800  Esteban Daly MD       Medication history completed by: Astrid Guevara, 3rd Year Student Pharmacist    "

## 2018-06-02 NOTE — PROVIDER NOTIFICATION
Spoke with Dr. Garza regarding Pt's blood culture ordered for 12 MN. Per MD blood culkture not needed since lab done at 6pm in the ED

## 2018-06-02 NOTE — PROGRESS NOTES
[facility], [place]    Internal Medicine Progress Note - Gold Service      Assessment & Plan   Parker Acevedo Sr is a 47 year old male admitted on 6/1/2018. He has a history of multiple abdominal surgeries (Celestino-en-Y, lap corey, gastrojejunostomy, gastrectomy, appendectomy), chronic abdominal pain, PUD, severe malnutrition, short gut syndrome on chronic TPN via Cm catheter with hx of recurrent bacteremia and is admitted for further evaluation of positive blood cx drawn PTA on 5/31.      # Recurrent Enterococcus faecalis bacteremia - Blood cx from 5/31 positive for Enterococcus faecalis and with sensitivities to Vancomycin and Ampicillin.   Afebrile last 24 hr, no leukocytosis. Repeat blood cx on 6/1 from CVC with GPCs in pairs and chains. Repeat cx from R arm have been NGTD.  Procal 0.09, CRP 30.  Previous Culture data (1/12/18- E.coli; 1/13/18-Enterococcus faecalis; 9/21/17- streptococcus mitis, streptococcus salivarius, rothia mucilangniosa).  ID consulted, recommend removal of Cm CVC vs treating through line infection with Vancomycin locks.  Would be most optimally treated with Ampicillin however has a childhood reaction to PCNs.  Will consider ordering PCN allergy testing/desensitization.   - Appreciate ID recs, will continue Vancomycin for now    - TTE added to evaluate for endocarditis   - Benadryl prior to Vancomycin dosing due to hx of Red Man syndrome     # Severe malnutrition on chronic TPN, G tube - Follows with Dr. Vyas (Bariatric Surgery). Uses G tube for venting, placed about 7 years ago.  Does not use for TFs. Has been on TPN last 3 years.  S/p placement of Cm CVC in 1/2018.  - Continue daily Mag, Phos  - Will d/w Bariatric Surgery regarding G tube replacement - pt reports being due for this   - Will continue IVFs for now       # Hx NICM - Stable.  Last echocardiogram on 2/13/2017 with mild cardiomyopathy with good contractile reserve, no inducible  ischemia, mild global hypokinesia of LV, with EF 45-50%.    - Continue PTA Coreg with hold parameters   - TTE in am     # Hypokalemia - K 3.3.  Ordered replacement x 1 dose.     # Chronic pain syndrome - Follows with Dr. Milligan, last seen in clinic on 4/4/18.  On Oxycodone 10-15 mg liquid by G tube QID PRN, fentanyl 50mcg/hr patch q48 hours, and Lidocaine patches.    - Continue regimen as above     # Hx ADHD - Continued on PTA regimen.         # Pain Assessment:  Current Pain Score 6/2/2018   Patient currently in pain? yes   Pain score (0-10) -   Pain location -   Pain descriptors -       Diet: Bariatric Diet Full Liquids  Fluids: D5 in 0.45% at 100cc/hr   DVT Prophylaxis: Anti-embolisim stockings (TEDs) and ambulate   Code Status: Full Code    Disposition Plan   Expected discharge: 2 - 3 days, recommended to prior living arrangement once antibiotic plan established.     Entered: Ilsa Blount 06/02/2018, 9:13 AM   Information in the above section will display in the discharge planner report.      The patient's care was discussed with the Attending Physician, Dr. Paul Armstrong.    Ilsa Blount  Internal Medicine Staff Hospitalist Service  [facility]  Pager: [phone]  Please see sticky note for cross cover information    Interval History   Parker feels good today.  Up walking around.  Felt a little dizziness this am, but quickly resolved.  Denies chest pain, dyspnea, nausea, vomiting, headache, and abdominal pain.      Data reviewed today: I reviewed all medications, new labs and imaging results over the last 24 hours.     Physical Exam   Vital Signs: Temp: 98.4  F (36.9  C) Temp src: Oral BP: 110/52 Pulse: 69 Heart Rate: 61 Resp: 16 SpO2: 96 % O2 Device: None (Room air)    Weight: 0 lbs 0 oz    GENERAL: Alert and oriented x 3. Well nourished, well developed.  No acute distress.    HEENT: Normocephalic, atraumatic. Anicteric sclera. Mucous membranes moist. Edentulous due to having teeth  pulled.   CV: RRR. S1, S2. No murmurs appreciated.   RESPIRATORY: Effort normal on room air. Lungs CTAB with no wheezing, rales, or rhonchi.   GI: Abdomen soft and non distended, bowel sounds present x all 4 quadrants. Noted scars due to abdominal surgeries. Arya button in place.  No tenderness, rebound, or guarding.   NEUROLOGICAL: No focal deficits. Follows commands.  Strength 5/5 in upper and lower extremities.   MUSCULOSKELETAL: No joint swelling or tenderness. Moves all extremities.   EXTREMITIES: No gross deformities. No peripheral edema, compression stockings in place.   SKIN: Grossly warm, dry, and intact. No jaundice. No rashes.         Data   Medications     dextrose 5% and 0.45% NaCl 100 mL/hr at 06/02/18 1209       amphetamine-dextroamphetamine  20 mg Oral Daily     [START ON 6/3/2018] carvedilol  12.5 mg Oral BID w/meals     vancomycin (VANCOCIN) IV  1,750 mg Intravenous Q12H    And     diphenhydrAMINE  25 mg Oral Q12H     fentaNYL  12 mcg Transdermal Q48H     fentaNYL  25 mcg Transdermal Q48H     fentaNYL   Transdermal Q8H     [START ON 6/4/2018] fentaNYL   Transdermal Q72H     sodium chloride (PF)  3 mL Intracatheter Q8H     sucralfate  1 g Oral 4x Daily     [START ON 6/3/2018] vitamin D  50,000 Units Oral Q7 Days     Data     Recent Labs  Lab 06/02/18  0609 06/01/18  1807   WBC 5.1 4.8   HGB 12.7* 13.2*   MCV 96 95   PLT 97* 124*    145*   POTASSIUM 3.3* 3.5   CHLORIDE 109 109   CO2 29 27   BUN 5* 5*   CR 0.64* 0.59*   ANIONGAP 7 10   SILAS 8.1* 8.0*   GLC 92 88   ALBUMIN  --  3.3*   PROTTOTAL  --  6.5*   BILITOTAL  --  0.4   ALKPHOS  --  86   ALT  --  18   AST  --  8   TROPI  --  <0.015     Recent Results (from the past 24 hour(s))   XR Chest 2 Views    Narrative    History: Chest pain.    COMPARISON: Two-view chest 5/3/2018.    FINDINGS: Right-sided subclavian or IJ catheter again noted with  external ports. Metallic device again noted over the C7 vertebral body  at the level of the top of the  trachea. No focal infiltrates,  pneumothorax or pleural effusion. A proximally 4 cm rounded opacity  along the spine at the level of the left hemidiaphragm and behind the  heart is likely a small hiatal hernia. The cardiac silhouette and  pulmonary vasculature are unchanged and within normal limits. Mild  anterior wedging of lower thoracic vertebral bodies is stable along  with degenerative changes about the mid and lower thoracic spine. No  acute bony abnormality about the thorax.      Impression    IMPRESSION: No acute cardiopulmonary abnormality. If the patient is  immunocompromised and there is concern for infection, airspace  infection can be quite subtle in immunocompromised patients and CT  would be appropriate.    JANEL TORRES MD

## 2018-06-02 NOTE — ED NOTES
[facility], [place]   ED Nurse to Floor Handoff     Parker Acevedo Sr is a 47 year old male who speaks English and lives with a spouse,  in a home  They arrived in the ED by car from emergency room    ED Chief Complaint: No chief complaint on file.    ED Dx;   Final diagnoses:   Bacteremia         Needed?: No    Allergies:   Allergies   Allergen Reactions     Bactrim [Sulfamethoxazole W/Trimethoprim] Rash     Penicillins Anaphylaxis     Doxycycline Rash     Vancomycin Rash     Rash after receiving vancomycin 3/28/16 (red man's?). Tolerated with slower infusion and diphenhydramine premed.   .  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)      Chronic abdominal pain      Difficulty swallowing      Gastric ulcer, unspecified as acute or chronic, without mention of hemorrhage, perforation, or obstruction      Gastro-oesophageal reflux disease      Generalized weakness 1/30/2018     Head injury      Hiatal hernia      Other bladder disorder      Other chronic pain      Severe malnutrition (H)     TPN     Short gut syndrome      Tobacco abuse       Baseline Mental status: WDL  Current Mental Status changes: at basesline    Infection present or suspected this encounter: yes other blood  Sepsis suspected: Yes  Isolation type: No active isolations     Activity level - Baseline/Home:  Independent  Activity Level - Current:   Independent    Bariatric equipment needed?: No    In the ED these meds were given:   Medications   diphenhydrAMINE (BENADRYL) capsule 25 mg (0 mg Oral Hold 6/1/18 2045)     And   vancomycin (VANCOCIN) 2,000 mg in sodium chloride 0.9 % 500 mL intermittent infusion (0 mg Intravenous Hold 6/1/18 2046)   HYDROmorphone (PF) (DILAUDID)  injection 1 mg (1 mg Intravenous Given 6/1/18 1918)   ondansetron (ZOFRAN) injection 4 mg (4 mg Intravenous Given 6/1/18 1917)   0.9% sodium chloride BOLUS (0 mLs Intravenous Stopped 6/1/18 2044)   lidocaine (viscous) (XYLOCAINE) 2 % 15 mL, alum & mag hydroxide-simethicone (MYLANTA ES/MAALOX  ES) 15 mL GI Cocktail ( Oral Given 6/1/18 1931)       Drips running?  No    Home pump  No    Current LDAs  Peripheral IV 06/01/18 Left Lower forearm (Active)   Number of days:0       CVC Single Lumen 01/17/18 Right Internal jugular (Active)   Number of days:135       Port A Cath 03/16/16 Right Chest wall (Active)   Number of days:807       Gastrostomy/Enterostomy Gastrostomy LUQ 1 18 fr (Active)   Number of days:2157       Incision/Surgical Site 02/20/15 Bilateral Neck (Active)   Number of days:1197       Incision/Surgical Site 02/15/17 Neck (Active)   Number of days:471       Incision/Surgical Site 12/19/17 Right Chest (Active)   Number of days:164       Labs results:   Labs Ordered and Resulted from Time of ED Arrival Up to the Time of Departure from the ED   CBC WITH PLATELETS DIFFERENTIAL - Abnormal; Notable for the following:        Result Value    RBC Count 4.16 (*)     Hemoglobin 13.2 (*)     Hematocrit 39.4 (*)     Platelet Count 124 (*)     All other components within normal limits   BASIC METABOLIC PANEL - Abnormal; Notable for the following:     Sodium 145 (*)     Urea Nitrogen 5 (*)     Creatinine 0.59 (*)     Calcium 8.0 (*)     All other components within normal limits   LACTIC ACID WHOLE BLOOD   UA MACROSCOPIC WITH REFLEX TO MICRO AND CULTURE   CK TOTAL   TROPONIN I   CRP INFLAMMATION   PROCALCITONIN   BLOOD CULTURE   BLOOD CULTURE       Imaging Studies:   Recent Results (from the past 24 hour(s))   XR Chest 2 Views    Narrative    History: Chest pain.    COMPARISON: Two-view chest 5/3/2018.    FINDINGS: Right-sided subclavian or IJ catheter again noted with  external ports. Metallic device again noted over the  C7 vertebral body  at the level of the top of the trachea. No focal infiltrates,  pneumothorax or pleural effusion. A proximally 4 cm rounded opacity  along the spine at the level of the left hemidiaphragm and behind the  heart is likely a small hiatal hernia. The cardiac silhouette and  pulmonary vasculature are unchanged and within normal limits. Mild  anterior wedging of lower thoracic vertebral bodies is stable along  with degenerative changes about the mid and lower thoracic spine. No  acute bony abnormality about the thorax.      Impression    IMPRESSION: No acute cardiopulmonary abnormality. If the patient is  immunocompromised and there is concern for infection, airspace  infection can be quite subtle in immunocompromised patients and CT  would be appropriate.    JANEL TORRES MD       Recent vital signs:   /77  Pulse (!) 45  Temp 99.9  F (37.7  C)  Resp 18  SpO2 100%    Cardiac Rhythm: Normal Sinus  Pt needs tele? No  Skin/wound Issues: unknown, no visible     Code Status: Full Code    Pain control: good    Nausea control: good    Abnormal labs/tests/findings requiring intervention: See Results     Family present during ED course? Yes   Family Comments/Social Situation comments: NA    Tasks needing completion: None    Ilsa House, RN    [phone] [facility]

## 2018-06-02 NOTE — H&P
[facility]    Internal Medicine History and Physical - Gold Service       Date of Admission:  6/1/2018    Assessment & Plan   Parker Acevedo Sr is a 47 year old male  admitted on 6/1/2018. He has PMH of hx of multiple abdominal surgeries (RnYGB, lap corey, gastrojejunostomy, gastrectomy, appendectomy), chronic abdominal pain, PUD, severe malnutrition, short gut syndrome on chronic TPN via Cm catheter with hx of recurrent bacteremia who presents to the ED from home for evaluation of positive blood cultures drawn PTA. Patient admitted to Medicine for further evaluation and care       # Recurrent Enterococcus Faecalis bacteremia: Presented to the ED with ~ 3 days hx of fever (tmax 102.0), chills, nausea, non-bloody emesis, muscle aches and HA. PTA BC (2/2) obtained on 5/31/18 Positive for Enterococcus faecalis with previous sensitivities to vancomycin, ampicillin. Previous Culture data (1/12/18- E.coli; 1/13/18-Enterococcus faecalis; 9/21/17- streptococcus mitis, streptococcus salivarius, rothia mucilangniosa). WBC 4.8, Platelets 124, Hgb 13.2, LA 0.9, initially HR 44 --> 60's, BP stable. CRP 26.0  - Please place PIV on Right for Vancomycin   - Add procal and hepatic panel  - IVF  - BCx2 ordered for am   - ID consult placed. Please discuss with in am  - Please do not use Cm   - Zofran PRN for nausea  - PTA fentanyl patch and PTA oxycodone   - CBC, BMP in am     # Hypernatremia: Na 145 on admission. Patient appears mild hypovolemia on exam.   - IVF  - BMP in am     # Severe malnutrition on chronic TPN, Gtube: Multiple abdominal surgeries. Gtube for venting. Does tolerate small amounts of PO intake. Is due to have Gtube replaced by Bariatric Surgery. Follows with Dr. Vyas.   - Add Phos and Mg  - Monitor lytes  - Please discuss Gtube replacement with Bariatric Surgery   - IVF  - Okay for patient to take small amounts of soft food    # Hx of NICM: Thought in setting  of malnutrition. ECHO 9/2017 with EF 55%, No pericardial effusion, no vegetation identified. No significant change from 1/2016. Negative NM stress test. [facility] cardiology clinic appointment 7/2017, with plan for 3 month f/u and low dose coreg (3.125). Patient currently on 12.5 mg ocreg PO BID.   - Hold coreg in setting of bacteremia and bradycardia to 44 w/o sx in the ED    # Chronic pain syndrome: Patient follows with  pain clinic, Dr. Mcgovern, with recent clinic visit 4/4/18. Pain contract: fentanyl patch 37mcg q48 hours (last placed on 5/30/18), Oxycodone 10-15 mg q4-6 hours. Plan to decrease opiates on next clinic visit 7/2018.   - Continue PTA medications   - Pulse ox    # Tobacco Abuse: Currently smokes ~ 4 cigarettes daily. Patient declined nicotine patch.  - Recommend cessation      # Pain Assessment:  Current Pain Score 4/25/2018   Patient currently in pain? -   Pain score (0-10) 0   Pain location -   Pain descriptors -   - Parker is experiencing pain due to chronic abdominal pain. Pain management was discussed with Parker and his significant other and the plan was created in a collaborative fashion.  Parker's response to the current recommendations: engaged  - Please see the plan for pain management as documented above      Diet:  Liquid diet  Fluids: D5 0.45% NS @ 100 cc/hr  DVT Prophylaxis: Pneumatic Compression Devices  Code Status: Prior    Disposition Plan   Expected discharge: 4 - 7 days; recommended to prior living arrangement once SIRS/Sepsis treated.     Entered: Linda Rachel 06/01/2018, 7:59 PM   Information in the above section will display in the discharge planner report.    The patient's care was discussed with the Attending Physician, Dr. Faustin.    Linda Rachel PA-C  Internal Medicine Hospitalist Service  [facility]  Pager: [phone]    Please see sticky note for cross cover information  ______________________________________________________________________    Chief  "Complaint   Positive BC, fever, chills, N/V    History is obtained from the patient, patients wife and EMR    History of Present Illness   Parker Acevedo Sr is a 47 year old male with complex PMH as outlined above who presented to the ED with positive BCx2 (draw on 5/31/18) obtained by home care RN.     Patient reports that he has been in his usual state of health until ~ 3-4 days ago when he developed acute onset fever to 102.0, chills, Nausea, non-bloody emesis, and muscle aches. Sx have been experienced previously with bacteremia. Since the port was removed (multiple ports with recurrent infections) and Cm placed 1/2018, patient reports no infections. However, patient reports that he was recently counseled by homecare that he no longer needed to practice \"sterile\" techniques when using port and could place cotton balls on top of the port. For the past 1-2 weeks patient reports that he has not practiced any basic precautions (handwashing etc) with handing of cm. He states that he knew he was \"septic\" when he started to experience sx as listed above.     Currently, patient without acute complaint. He ruminates on needing a Midline for Vancomycin infusion d/t pain and \"bad veins\" as he \"blows IVs easily\". We discussed the need to get abx on board and patient agreeable to PIV on right UE.     Patient does not endorse changes in vision, palpitations, upper respiratory symptoms of rhinorrhea or congestion, shortness of breath, cough, sputum production, wheezing, constipation, diarrhea, dysuria, edema, rashes, weakness, focal neurologic deficits, recent travel        Review of Systems   The 10 point Review of Systems is negative other than noted in the HPI or here.     Past Medical History    I have reviewed this patient's medical history and updated it with pertinent information if needed.   Past Medical History:   Diagnosis Date     ADHD (attention deficit hyperactivity disorder)      Anxiety      " 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)     mild EF ~45% on rest 2/13/17, improves with stressing     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)      Chronic abdominal pain      Difficulty swallowing      Gastric ulcer, unspecified as acute or chronic, without mention of hemorrhage, perforation, or obstruction      Gastro-oesophageal reflux disease      Generalized weakness 1/30/2018     Head injury      Hiatal hernia      Other bladder disorder      Other chronic pain      Severe malnutrition (H)     TPN     Short gut syndrome      Tobacco abuse        Past Surgical History   I have reviewed this patient's surgical history and updated it with pertinent information if needed.  Past Surgical History:   Procedure Laterality Date     AMPUTATION       APPENDECTOMY       BACK SURGERY  11/3/2014    curve in the spine     BIOPSY LYMPH NODE CERVICAL N/A 2/20/2015    Procedure: BIOPSY LYMPH NODE CERVICAL;  Surgeon: Baron Scanlon MD;  Location: [facility] OR     C GASTRIC BYPASS,OBESE<100CM SHAYLEE-EN-Y  2002    lost 300 pounds     CHOLECYSTECTOMY       DISCECTOMY, FUSION CERVICAL ANTERIOR ONE LEVEL, COMBINED N/A 2/15/2017    Procedure: COMBINED DISCECTOMY, FUSION CERVICAL ANTERIOR ONE LEVEL;  Surgeon: Darren Campos MD;  Location: [facility] OR     ENDOSCOPIC INSERTION TUBE GASTROSTOMY  9/9/2013    Procedure: ENDOSCOPIC INSERTION TUBE GASTROSTOMY;;  Surgeon: Francis Vyas MD;  [facility]: [facility]     ENDOSCOPIC ULTRASOUND UPPER GASTROINTESTINAL TRACT (GI)  4/29/2011    Procedure:ENDOSCOPIC ULTRASOUND UPPER GASTROINTESTINAL TRACT (GI); Both Procedures done Conjointly; Surgeon:NEREIDA HOUSER; Location:[facility]     ENDOSCOPIC ULTRASOUND UPPER GASTROINTESTINAL TRACT (GI)  9/9/2013    Procedure: ENDOSCOPIC ULTRASOUND UPPER GASTROINTESTINAL TRACT (GI);  Endoscopic Ultrasound Guide Gastrostomy Tube Placement  C-arm;  Surgeon: Noe Lizarraga MD;  [facility]     ENDOSCOPY  03/25/11    EGD, [facility]     ENDOSCOPY   08/04/09    Upper Endoscopy, [facility]     ENDOSCOPY  01/05/09    Upper Endoscopy, [facility]     ESOPHAGOSCOPY, GASTROSCOPY, DUODENOSCOPY (EGD), COMBINED  4/20/2011    Procedure:COMBINED ESOPHAGOSCOPY, GASTROSCOPY, DUODENOSCOPY (EGD); Surgeon:BLU VYAS; Location: GI     ESOPHAGOSCOPY, GASTROSCOPY, DUODENOSCOPY (EGD), COMBINED  6/15/2011    Procedure:COMBINED ESOPHAGOSCOPY, GASTROSCOPY, DUODENOSCOPY (EGD); Surgeon:BLU VYAS; [facility]     ESOPHAGOSCOPY, GASTROSCOPY, DUODENOSCOPY (EGD), COMBINED  6/12/2013    Procedure: COMBINED ESOPHAGOSCOPY, GASTROSCOPY, DUODENOSCOPY (EGD);;  Surgeon: Blu Vyas MD;  Location:  GI     ESOPHAGOSCOPY, GASTROSCOPY, DUODENOSCOPY (EGD), COMBINED  11/22/2013    Procedure: COMBINED ESOPHAGOSCOPY, GASTROSCOPY, DUODENOSCOPY (EGD);;  Surgeon: Blu Vyas MD;  Location:  OR     ESOPHAGOSCOPY, GASTROSCOPY, DUODENOSCOPY (EGD), COMBINED  4/30/2014    Procedure: COMBINED ESOPHAGOSCOPY, GASTROSCOPY, DUODENOSCOPY (EGD);  Surgeon: Blu Vyas MD;  Location:  GI     ESOPHAGOSCOPY, GASTROSCOPY, DUODENOSCOPY (EGD), COMBINED N/A 2/20/2015    Procedure: COMBINED ESOPHAGOSCOPY, GASTROSCOPY, DUODENOSCOPY (EGD), BIOPSY SINGLE OR MULTIPLE;  Surgeon: Baron Scanlon MD;  Location:  OR     ESOPHAGOSCOPY, GASTROSCOPY, DUODENOSCOPY (EGD), COMBINED N/A 9/30/2015    Procedure: COMBINED ESOPHAGOSCOPY, GASTROSCOPY, DUODENOSCOPY (EGD);  Surgeon: Blu Vyas MD;  [facility]     GASTRECTOMY  6/22/2012    Procedure: GASTRECTOMY;  Open Approach, Excise Ulcers,Partial Gastrectomy, Esophagojejunostomy, Hiatal Hernia Repair, Extensive Lysis of Adhesions and Esaphagogastrodudenoscopy.;  Surgeon: Blu Vyas MD;  [facility]     GASTROJEJUNOSTOMY  08/26/09    Extensice enterolysis, partial resect. jejunum, part. resect gastric pouch, gastrojejunostomy anastomosis     HC ESOPH/GAS REFLUX TEST W NASAL IMPED  ELECTRODE  8/5/2013    Procedure: ESOPHAGEAL IMPEDENCE FUNCTION TEST 1 HOUR OR LESS;  Surgeon: Haile Lang MD;  [facility]: [facility]     HEAD & NECK SURGERY  2/15/2017    C5-C6     HERNIA REPAIR  2006    Umbilical hernia     HERNIORRHAPHY HIATAL  6/22/2012    Procedure: HERNIORRHAPHY HIATAL;;  Surgeon: Francis Vyas MD;  [facility]     HERNIORRHAPHY INGUINAL  11/22/2013    Procedure: HERNIORRHAPHY INGUINAL;;  Surgeon: Francis Vyas MD;  [facility]     INSERT PORT VASCULAR ACCESS Right 12/19/2017    Procedure: INSERT PORT VASCULAR ACCESS;  Right Chest Port Placement ;  Surgeon: Lisandro Alejandro PA-C;  [facility]     LAPAROTOMY EXPLORATORY  11/22/2013    Procedure: LAPAROTOMY EXPLORATORY;  Exploratory Laparotomy, Upper Endoscopy, Left Inguinal Hernia Repair;  Surgeon: Francis Vyas MD;  [facility]     ORTHOPEDIC SURGERY       PICC INSERTION Right 03/16/2017    5fr DL BioFlo PICC, 42cm (3cm external) in the R medial brachial vein w/ tip in the SVC RA junction.     PICC INSERTION Left 09/23/2017    5fr DL BioFlo PICC, 45cm (1cm external) in the L basilic vein w/ tip in the SVC RA junction.     SHAYLEE EN Y BOWEL  2003     SOFT TISSUE SURGERY       SOFT TISSUE SURGERY       THORACIC SURGERY       TONSILLECTOMY          Social History   Social History   Substance Use Topics     Smoking status: Light Tobacco Smoker     Packs/day: 0.10     Years: 3.00     Types: Cigarettes     Smokeless tobacco: Current User      Comment: I use an e cig every now and than     Alcohol use No      Comment: quit        Family History   I have reviewed this patient's family history and updated it with pertinent information if needed.   Family History   Problem Relation Age of Onset     GASTROINTESTINAL DISEASE Mother      Crohns disease     Anxiety Disorder Mother      Thyroid Disease Mother      Grave's disease     CANCER Father      ear cancer-skin cancer/melanoma     Breast Cancer Maternal  "Grandmother      Anxiety Disorder Sister      DIABETES Maternal Uncle      Breast Cancer Other      Hypertension No family hx of      Hyperlipidemia No family hx of      CEREBROVASCULAR DISEASE No family hx of      Prostate Cancer No family hx of      Depression No family hx of      Anesthesia Reaction No family hx of      Asthma No family hx of      OSTEOPOROSIS No family hx of      Genetic Disorder No family hx of      Obesity No family hx of      MENTAL ILLNESS No family hx of      Substance Abuse No family hx of        Prior to Admission Medications   Prior to Admission Medications   Prescriptions Last Dose Informant Patient Reported? Taking?   Carvedilol (COREG PO)   Yes No   Sig: Take 12.5 mg by mouth 2 times daily   [place] HOME INFUSION MANAGED PATIENT   No No   Sig: Contact [facility] for patient specific medication information at [phone] on admission and discharge from the hospital.  Phones are answered 24 hours a day 7 days a week 365 days a year.    Providers - Choose \"CONTINUE HOME MED (no script)\" at discharge if patient treatment with home infusion will continue.   [place] HOME INFUSION MANAGED PATIENT   No No   Sig: Contact [facility] for patient specific medication information at [phone] on admission and discharge from the hospital.  Phones are answered 24 hours a day 7 days a week 365 days a year.    Providers - Choose \"CONTINUE HOME MED (no script)\" at discharge if patient treatment with home infusion will continue.   Multiple Vitamin (MULTI-VITAMINS) TABS   Yes No   Sig: Take 1 tablet by mouth   Needle, Disp, (BD DISP NEEDLES) 27G X 1/2\" MISC   No No   Si Device every 30 days Use for cyanocobalamin injection once q 30 days.   albuterol (PROAIR HFA/PROVENTIL HFA/VENTOLIN HFA) 108 (90 Base) MCG/ACT Inhaler   No No   Sig: Inhale 2 puffs into the lungs every 4 hours as needed for shortness of breath / dyspnea or wheezing   amphetamine-dextroamphetamine " (ADDERALL) 20 MG per tablet   No No   Sig: Take 1 tablet (20 mg) by mouth daily   amphetamine-dextroamphetamine (ADDERALL) 20 MG per tablet   No No   Sig: Take 1 tablet (20 mg) by mouth daily   amphetamine-dextroamphetamine (ADDERALL) 20 MG per tablet   No No   Sig: Take 1 tablet (20 mg) by mouth daily   cyanocobalamin (VITAMIN B12) 1000 MCG/ML injection   Yes No   Sig: Inject 1 mL into the muscle every 30 days   cyanocobalamin (VITAMIN B12) 1000 MCG/ML injection   No No   Sig: Inject 1 mL (1,000 mcg) into the muscle every 30 days   diphenhydrAMINE (BENADRYL) 12.5 MG/5ML solution   No No   Sig: Take 10 mLs (25 mg) by mouth 2 times daily   Patient not taking: Reported on 5/3/2018   fentaNYL (DURAGESIC) 12 mcg/hr 72 hr patch   No No   Sig: Place 1 patch onto the skin every 48 hours . Remove old patch.  Fill on/after 6/22 to start on/after 6/24   fentaNYL (DURAGESIC) 25 mcg/hr 72 hr patch   No No   Sig: Place 1 patch onto the skin every 48 hours remove old patch.  Release on/after 6/22/18 to start on/after 6/24/18   levofloxacin (LEVAQUIN) 500 MG tablet   No No   Sig: Take 1 tablet (500 mg) by mouth daily   Patient not taking: Reported on 4/4/2018   levofloxacin (LEVAQUIN) 500 MG tablet   No No   Sig: Take 1 tablet (500 mg) by mouth daily   lidocaine (LIDODERM) 5 % Patch   No No   Sig: Place 1-2 patches onto the skin every 24 hours Wear for 12 hours, remove for 12 hours.  OK to cut to better fit to size.   Patient not taking: Reported on 5/3/2018   lidocaine-prilocaine (EMLA) cream   No No   Sig: Apply topically as needed for moderate pain   Patient not taking: Reported on 5/3/2018   morphine 0.1% in intrasite topical gel   No No   Sig: Apply as needed prior to accessing the port site.   Patient not taking: Reported on 4/4/2018   mupirocin (BACTROBAN) 2 % ointment   No No   Sig: Apply topically 3 times daily   naloxone (NARCAN) nasal spray   No No   Sig: Spray 1 spray (4 mg) into one nostril alternating nostrils as  needed for opioid reversal (every 2-3 minutes until assistance arrives.)   nystatin-triamcinolone (MYCOLOG II) cream   No No   Sig: Apply topically 2 times daily as needed   ondansetron (ZOFRAN-ODT) 8 MG ODT tab   No No   Sig: Take 1 tablet (8 mg) by mouth every 8 hours as needed for nausea   order for DME   No No   Sig: Injection Supplies for Vitamin B12: 3cc syringes w/ 27 gauge needles, 1/2 inch length   order for DME   No No   Sig: Equipment being ordered: Bilateral knee high chronic venous insufficiency stockings--  mild-moderate pressures.   oxyCODONE (ROXICODONE) 5 MG/5ML solution   No No   Sig: Take 10-15 mLs (10-15 mg) by mouth every 4 hours as needed for moderate to severe pain Max of 55 mg/day this month. Wean as able Fill on/after 6/8/18 not to start untill 6/11/18. Early fill for travel.   predniSONE (DELTASONE) 10 MG tablet   No No   Sig: Daily tapering dose:  20/d x 3 days, 10/d x 3 days   Patient not taking: Reported on 5/3/2018   sucralfate (CARAFATE) 1 GM/10ML suspension   No No   Sig: Take 10 mLs (1 g) by mouth 4 times daily   vitamin D (ERGOCALCIFEROL) 82960 UNIT capsule   No No   Sig: Take 1 capsule (50,000 Units) by mouth every 7 days      Facility-Administered Medications: None     Allergies   Allergies   Allergen Reactions     Bactrim [Sulfamethoxazole W/Trimethoprim] Rash     Penicillins Anaphylaxis     Doxycycline Rash     Vancomycin Rash     Rash after receiving vancomycin 3/28/16 (red man's?). Tolerated with slower infusion and diphenhydramine premed.       Physical Exam   Vital Signs: Temp: 99.9  F (37.7  C)   BP: 140/77 Pulse: (!) 45   Resp: 18 SpO2: 100 % O2 Device: None (Room air)    Weight: 0 lbs 0 oz      Physical Exam   Constitutional: Pale male sitting up in ED bed. Wife at bedside    HEENT:   Head: Normocephalic and atraumatic.   Eyes: Sunglasses in place. Teeth extracted.   Pharynx has no erythema or exudate, mucous mebranes are moist  Neck:   No adenopathy, no bony  tenderness  Cardiovascular: Regular rate and rhythm without murmurs or gallops  Pulmonary/Chest: Clear to auscultation bilaterally, with no wheezes or retractions. No respiratory distress.  GI: Soft with good bowel sounds.  Non-tender, non-distended, with no guarding, no rebound, no peritoneal signs. Gtube LUQ in place with Tegaderm dressing overlaying. Yellow drainage noted on gauze.    Back:  No bony or CVA tenderness   Musculoskeletal:  No edema or clubbing   Skin: Skin is warm and dry. No rash noted to exposed skin areas  Neurological: Alert and oriented to person, place, and time. Nonfocal exam  Psychiatric:  Normal mood and affect.      Data   Data reviewed today: I reviewed all medications, new labs and imaging results over the last 24 hours. I personally reviewed recent imaging impressions, daily labs, and progress notes   Data     Recent Labs  Lab 06/01/18  1807   WBC 4.8   HGB 13.2*   MCV 95   *   *   POTASSIUM 3.5   CHLORIDE 109   CO2 27   BUN 5*   CR 0.59*   ANIONGAP 10   SILAS 8.0*   GLC 88   TROPI <0.015

## 2018-06-02 NOTE — ED NOTES
Patient refusing IV antibiotics, stating that he would need a midline catheter in order for medicine to not infiltrate. Patient has historically had vancomycin extravasate in tissue and is concerned that his veins are too scarred from previous use. Vascular paged and has no one available until tomorrow. ED MD updated in addition to MD student from inpatient team who will relay information. No further orders at this time.

## 2018-06-02 NOTE — PHARMACY-VANCOMYCIN DOSING SERVICE
Pharmacy Vancomycin Initial Note  Date of Service 2018  Patient's  1970  47 year old, male    Indication: Bone and Joint Infection    Current estimated CrCl = Estimated Creatinine Clearance: 200.8 mL/min (based on Cr of 0.59).    Creatinine for last 3 days  2018:  6:07 PM Creatinine 0.59 mg/dL    Recent Vancomycin Level(s) for last 3 days  No results found for requested labs within last 72 hours.      Vancomycin IV Administrations (past 72 hours)      No vancomycin orders with administrations in past 72 hours.                Nephrotoxins and other renal medications (Future)    Start     Dose/Rate Route Frequency Ordered Stop    18 1030  vancomycin (VANCOCIN) 1,750 mg in sodium chloride 0.9 % 500 mL intermittent infusion      1,750 mg  over 3 Hours Intravenous EVERY 12 HOURS 18 2146      18 2000  vancomycin (VANCOCIN) 2,000 mg in sodium chloride 0.9 % 500 mL intermittent infusion      2,000 mg  over 3 Hours Intravenous ONCE 18 1922            Contrast Orders - past 72 hours     None                Plan:  1.  Load with vancomycin 2000mg IV x1 (23mg/kg using ABW = 89kg) followed by vancomycin  1750 mg IV q12h (19.7mg/kg).  Infusions to run over 3 hours with diphenhydramine pre med due to h/o Red Man Syndrome .   2.  Goal Trough Level: 15-20 mg/L   3.  Pharmacy will check trough levels as appropriate in 1-3 Days.    4. Serum creatinine levels will be ordered daily for the first week of therapy and at least twice weekly for subsequent weeks.    5. [place] method utilized to dose vancomycin therapy: Method 2    Teressa Casillas, PharmD

## 2018-06-03 ENCOUNTER — APPOINTMENT (OUTPATIENT)
Dept: CARDIOLOGY | Facility: CLINIC | Age: 48
DRG: 314 | End: 2018-06-03
Attending: NURSE PRACTITIONER
Payer: COMMERCIAL

## 2018-06-03 LAB
ANION GAP SERPL CALCULATED.3IONS-SCNC: 10 MMOL/L (ref 3–14)
BUN SERPL-MCNC: 7 MG/DL (ref 7–30)
CALCIUM SERPL-MCNC: 8.3 MG/DL (ref 8.5–10.1)
CHLORIDE SERPL-SCNC: 110 MMOL/L (ref 94–109)
CO2 SERPL-SCNC: 21 MMOL/L (ref 20–32)
CREAT SERPL-MCNC: 0.6 MG/DL (ref 0.66–1.25)
ERYTHROCYTE [DISTWIDTH] IN BLOOD BY AUTOMATED COUNT: 13.4 % (ref 10–15)
GFR SERPL CREATININE-BSD FRML MDRD: >90 ML/MIN/1.7M2
GLUCOSE SERPL-MCNC: 80 MG/DL (ref 70–99)
HCT VFR BLD AUTO: 41.4 % (ref 40–53)
HGB BLD-MCNC: 13.2 G/DL (ref 13.3–17.7)
MAGNESIUM SERPL-MCNC: 2.2 MG/DL (ref 1.6–2.3)
MCH RBC QN AUTO: 31.3 PG (ref 26.5–33)
MCHC RBC AUTO-ENTMCNC: 31.9 G/DL (ref 31.5–36.5)
MCV RBC AUTO: 98 FL (ref 78–100)
PHOSPHATE SERPL-MCNC: 4.2 MG/DL (ref 2.5–4.5)
PLATELET # BLD AUTO: 111 10E9/L (ref 150–450)
POTASSIUM SERPL-SCNC: 3.7 MMOL/L (ref 3.4–5.3)
RBC # BLD AUTO: 4.22 10E12/L (ref 4.4–5.9)
SODIUM SERPL-SCNC: 142 MMOL/L (ref 133–144)
VANCOMYCIN SERPL-MCNC: 11 MG/L
WBC # BLD AUTO: 5.7 10E9/L (ref 4–11)

## 2018-06-03 PROCEDURE — 99207 ZZC CDG-MDM COMPONENT: MEETS MODERATE - UP CODED: CPT | Performed by: NURSE PRACTITIONER

## 2018-06-03 PROCEDURE — 85027 COMPLETE CBC AUTOMATED: CPT | Performed by: NURSE PRACTITIONER

## 2018-06-03 PROCEDURE — 40000141 ZZH STATISTIC PERIPHERAL IV START W/O US GUIDANCE

## 2018-06-03 PROCEDURE — 25000132 ZZH RX MED GY IP 250 OP 250 PS 637: Performed by: NURSE PRACTITIONER

## 2018-06-03 PROCEDURE — 25800025 ZZH RX 258: Performed by: PHYSICIAN ASSISTANT

## 2018-06-03 PROCEDURE — 25000125 ZZHC RX 250: Performed by: PHYSICIAN ASSISTANT

## 2018-06-03 PROCEDURE — 40000556 ZZH STATISTIC PERIPHERAL IV START W US GUIDANCE

## 2018-06-03 PROCEDURE — 83735 ASSAY OF MAGNESIUM: CPT | Performed by: NURSE PRACTITIONER

## 2018-06-03 PROCEDURE — 93306 TTE W/DOPPLER COMPLETE: CPT | Mod: 26 | Performed by: INTERNAL MEDICINE

## 2018-06-03 PROCEDURE — 80048 BASIC METABOLIC PNL TOTAL CA: CPT | Performed by: NURSE PRACTITIONER

## 2018-06-03 PROCEDURE — 12000001 ZZH R&B MED SURG/OB UMMC

## 2018-06-03 PROCEDURE — 25000132 ZZH RX MED GY IP 250 OP 250 PS 637: Performed by: INTERNAL MEDICINE

## 2018-06-03 PROCEDURE — 36415 COLL VENOUS BLD VENIPUNCTURE: CPT | Performed by: INTERNAL MEDICINE

## 2018-06-03 PROCEDURE — 36415 COLL VENOUS BLD VENIPUNCTURE: CPT | Performed by: NURSE PRACTITIONER

## 2018-06-03 PROCEDURE — 25000128 H RX IP 250 OP 636: Performed by: EMERGENCY MEDICINE

## 2018-06-03 PROCEDURE — 25000132 ZZH RX MED GY IP 250 OP 250 PS 637: Performed by: EMERGENCY MEDICINE

## 2018-06-03 PROCEDURE — 87040 BLOOD CULTURE FOR BACTERIA: CPT | Performed by: PHYSICIAN ASSISTANT

## 2018-06-03 PROCEDURE — 93306 TTE W/DOPPLER COMPLETE: CPT

## 2018-06-03 PROCEDURE — 99233 SBSQ HOSP IP/OBS HIGH 50: CPT | Performed by: NURSE PRACTITIONER

## 2018-06-03 PROCEDURE — 84100 ASSAY OF PHOSPHORUS: CPT | Performed by: NURSE PRACTITIONER

## 2018-06-03 PROCEDURE — 25000132 ZZH RX MED GY IP 250 OP 250 PS 637: Performed by: PHYSICIAN ASSISTANT

## 2018-06-03 PROCEDURE — 80202 ASSAY OF VANCOMYCIN: CPT | Performed by: INTERNAL MEDICINE

## 2018-06-03 PROCEDURE — 25000128 H RX IP 250 OP 636: Performed by: INTERNAL MEDICINE

## 2018-06-03 RX ORDER — LIDOCAINE 40 MG/G
CREAM TOPICAL
Status: DISCONTINUED | OUTPATIENT
Start: 2018-06-03 | End: 2018-06-06 | Stop reason: HOSPADM

## 2018-06-03 RX ORDER — HEPARIN SODIUM,PORCINE 10 UNIT/ML
5-10 VIAL (ML) INTRAVENOUS
Status: DISCONTINUED | OUTPATIENT
Start: 2018-06-03 | End: 2018-06-06 | Stop reason: HOSPADM

## 2018-06-03 RX ORDER — DIPHENHYDRAMINE HCL 12.5MG/5ML
25 LIQUID (ML) ORAL EVERY 8 HOURS
Status: DISCONTINUED | OUTPATIENT
Start: 2018-06-03 | End: 2018-06-06 | Stop reason: HOSPADM

## 2018-06-03 RX ORDER — HEPARIN SODIUM,PORCINE 10 UNIT/ML
5-10 VIAL (ML) INTRAVENOUS EVERY 24 HOURS
Status: DISCONTINUED | OUTPATIENT
Start: 2018-06-03 | End: 2018-06-06 | Stop reason: HOSPADM

## 2018-06-03 RX ORDER — CEFAZOLIN SODIUM 1 G/50ML
1250 SOLUTION INTRAVENOUS EVERY 8 HOURS
Status: DISCONTINUED | OUTPATIENT
Start: 2018-06-03 | End: 2018-06-06 | Stop reason: HOSPADM

## 2018-06-03 RX ADMIN — VANCOMYCIN HYDROCHLORIDE 1750 MG: 10 INJECTION, POWDER, LYOPHILIZED, FOR SOLUTION INTRAVENOUS at 00:09

## 2018-06-03 RX ADMIN — OXYCODONE HYDROCHLORIDE 15 MG: 5 SOLUTION ORAL at 12:02

## 2018-06-03 RX ADMIN — SUCRALFATE 1 G: 1 SUSPENSION ORAL at 07:49

## 2018-06-03 RX ADMIN — FENTANYL 1 PATCH: 25 PATCH, EXTENDED RELEASE TRANSDERMAL at 23:11

## 2018-06-03 RX ADMIN — OXYCODONE HYDROCHLORIDE 15 MG: 5 SOLUTION ORAL at 23:15

## 2018-06-03 RX ADMIN — CARVEDILOL 12.5 MG: 12.5 TABLET, FILM COATED ORAL at 20:20

## 2018-06-03 RX ADMIN — CARVEDILOL 12.5 MG: 12.5 TABLET, FILM COATED ORAL at 07:49

## 2018-06-03 RX ADMIN — DIPHENHYDRAMINE HYDROCHLORIDE 25 MG: 25 SOLUTION ORAL at 21:26

## 2018-06-03 RX ADMIN — OXYCODONE HYDROCHLORIDE 15 MG: 5 SOLUTION ORAL at 07:45

## 2018-06-03 RX ADMIN — ERGOCALCIFEROL 50000 UNITS: 1.25 CAPSULE ORAL at 07:49

## 2018-06-03 RX ADMIN — ONDANSETRON 4 MG: 4 TABLET, ORALLY DISINTEGRATING ORAL at 23:08

## 2018-06-03 RX ADMIN — OXYCODONE HYDROCHLORIDE 15 MG: 5 SOLUTION ORAL at 03:34

## 2018-06-03 RX ADMIN — SUCRALFATE 1 G: 1 SUSPENSION ORAL at 16:19

## 2018-06-03 RX ADMIN — DIPHENHYDRAMINE HYDROCHLORIDE 25 MG: 25 SOLUTION ORAL at 11:24

## 2018-06-03 RX ADMIN — DEXTROAMPHETAMINE SACCHARATE, AMPHETAMINE ASPARTATE, DEXTROAMPHETAMINE SULFATE AND AMPHETAMINE SULFATE 20 MG: 2.5; 2.5; 2.5; 2.5 TABLET ORAL at 07:48

## 2018-06-03 RX ADMIN — FENTANYL 1 PATCH: 12 PATCH TRANSDERMAL at 23:10

## 2018-06-03 RX ADMIN — VANCOMYCIN HYDROCHLORIDE 1250 MG: 10 INJECTION, POWDER, LYOPHILIZED, FOR SOLUTION INTRAVENOUS at 21:53

## 2018-06-03 RX ADMIN — SUCRALFATE 1 G: 1 SUSPENSION ORAL at 12:02

## 2018-06-03 RX ADMIN — VANCOMYCIN HYDROCHLORIDE 1750 MG: 10 INJECTION, POWDER, LYOPHILIZED, FOR SOLUTION INTRAVENOUS at 12:03

## 2018-06-03 RX ADMIN — ONDANSETRON 4 MG: 4 TABLET, ORALLY DISINTEGRATING ORAL at 07:59

## 2018-06-03 RX ADMIN — DEXTROSE AND SODIUM CHLORIDE: 5; 450 INJECTION, SOLUTION INTRAVENOUS at 07:49

## 2018-06-03 RX ADMIN — OXYCODONE HYDROCHLORIDE 15 MG: 5 SOLUTION ORAL at 16:19

## 2018-06-03 RX ADMIN — ONDANSETRON 4 MG: 4 TABLET, ORALLY DISINTEGRATING ORAL at 16:19

## 2018-06-03 RX ADMIN — SUCRALFATE 1 G: 1 SUSPENSION ORAL at 20:20

## 2018-06-03 NOTE — PROGRESS NOTES
Patient well known to the bariatric service who is currently admitted with recurrent Enterococcus faecalis bacteremia.  Patient has been noting that his G-tube is no longer flushing well, and that he is having small leakage from around the tube site. Thinks that this was last exchanged about a year ago when he saw Dr. Vyas in clinic.  Primary team contacted the MIS team to have replace the G-tube.  The current tube is an 18 [place], 3 cm Jim.  A similar replacement g tube was obtained, and an exchange was performed at bedside, which the patient tolerated very well. The balloon was insufflated with 4.5cc of water.    Plan to follow-up with bariatric clinic on previously scheduled appointment on  6/21/2018.    Russ Townsend MD   Gen Surg PGY-2  P: [phone]

## 2018-06-03 NOTE — PROGRESS NOTES
[facility], [place]    Internal Medicine Progress Note - Gold Service      Assessment & Plan   Parker Acevedo Sr is a 47 year old male admitted on 6/1/2018. He has a history of multiple abdominal surgeries (Celestino-en-Y, lap corey, gastrojejunostomy, gastrectomy, appendectomy), chronic abdominal pain, PUD, severe malnutrition, short gut syndrome on chronic TPN via Cm catheter with hx of recurrent bacteremia and is admitted for further evaluation of positive blood cx drawn PTA on 5/31.      # Recurrent Enterococcus faecalis bacteremia - Blood cx from 5/31 positive for Enterococcus faecalis and with sensitivities to Vancomycin and Ampicillin.   Afebrile last 24 hr, no leukocytosis. Repeat blood cx on 6/1 from CVC with GPCs in pairs and chains.  Repeat cx from R arm have been NGTD.  Procal 0.09, CRP 30.  Previous Culture data (1/12/18- E.coli; 1/13/18-Enterococcus faecalis; 9/21/17- streptococcus mitis, streptococcus salivarius, rothia mucilangniosa).  Per pt, would like to have line replaced.  Per ID, preferred tx with Ampicillin though pt will need PCN desensitization.  TTE negative for valvular vegetation.       - Appreciate ID recs  - PCN Allergy skin test consult   - Per ID, will need line holiday then replacement of CVC   - Will plan to d/c CVC - will place consult for IR to remove   - Continue Vancomycin for now    - Benadryl prior to Vancomycin dosing due to hx of Red Man syndrome     # Severe malnutrition on chronic TPN, G tube - Follows with Dr. Vyas (Bariatric Surgery). Uses G tube for venting, placed about 7 years ago.  Does not use for TFs. Has been on TPN last 3 years.  S/p placement of Cm CVC in 1/2018. D/w Bariatric Surgery this am, will replace G tube at bedside.    - Continue daily Mag, Phos  - Will continue IVFs for now       # Hx NICM - Stable.  Last echocardiogram on 2/13/2017 with mild cardiomyopathy with good contractile reserve, no inducible ischemia,  mild global hypokinesia of LV, with EF 45-50%.  Repeat TTE this admission with EF 60-65%, normal RV function, no significant valvular disease.   - Continue PTA Coreg with hold parameters   - TTE in am     # Chronic pain syndrome - Follows with Dr. Milligan, last seen in clinic on 4/4/18.  On Oxycodone 10-15 mg liquid by G tube QID PRN, fentanyl 50mcg/hr patch q48 hours, and Lidocaine patches.    - Continue regimen as above     # Hx ADHD - Continued on PTA regimen.         # Pain Assessment:  Current Pain Score 6/3/2018   Patient currently in pain? yes   Pain score (0-10) -   Pain location Back   Pain descriptors Aching;Constant       Diet: Bariatric Diet Full Liquids  Fluids: D5 in 0.45% at 100cc/hr   DVT Prophylaxis: Anti-embolisim stockings (TEDs) and ambulate   Code Status: Full Code    Disposition Plan   Expected discharge: 2 - 3 days, recommended to prior living arrangement once antibiotic plan established and CVC replaced.     Entered: Ilsa Blount 06/03/2018, 7:57 AM   Information in the above section will display in the discharge planner report.      The patient's care was discussed with the Attending Physician, Dr. Paul Armstrong.    Ilsa Blount  Internal Medicine Staff Hospitalist Service  [facility]  Pager: [phone]  Please see sticky note for cross cover information    Interval History   Parker feels okay today.  Wife at bedside. Felt a little dizziness and headache this am.  Feels like dehydrated.  Denies chest pain, dyspnea, nausea, vomiting, headache, and abdominal pain.      Data reviewed today: I reviewed all medications, new labs and imaging results over the last 24 hours.     Physical Exam   Vital Signs: Temp: 98  F (36.7  C) Temp src: Oral BP: 102/51   Heart Rate: 64 Resp: 16 SpO2: 100 % O2 Device: None (Room air)    Weight: 0 lbs 0 oz    GENERAL: Alert and oriented x 3. Well nourished, well developed.  No acute distress.    HEENT: Normocephalic, atraumatic. Anicteric  sclera. Mucous membranes moist. Edentulous due to having teeth pulled.   CV: RRR. S1, S2. No murmurs appreciated.   RESPIRATORY: Effort normal on room air. Lungs CTAB with no wheezing, rales, or rhonchi.   GI: Abdomen soft and non distended, bowel sounds present x all 4 quadrants. Noted scars due to abdominal surgeries. G tube in place.  No tenderness, rebound, or guarding.   NEUROLOGICAL: No focal deficits. Follows commands.  Strength 5/5 in upper and lower extremities.   MUSCULOSKELETAL: No joint swelling or tenderness. Moves all extremities.   EXTREMITIES: No gross deformities. No peripheral edema, compression stockings in place.   SKIN: Grossly warm, dry, and intact. No jaundice. No rashes.         Data   Medications     dextrose 5% and 0.45% NaCl 100 mL/hr at 06/03/18 0749       amphetamine-dextroamphetamine  20 mg Oral Daily     carvedilol  12.5 mg Oral BID w/meals     vancomycin (VANCOCIN) IV  1,750 mg Intravenous Q12H    And     diphenhydrAMINE  25 mg Oral Q12H     fentaNYL  12 mcg Transdermal Q48H     fentaNYL  25 mcg Transdermal Q48H     fentaNYL   Transdermal Q8H     [START ON 6/4/2018] fentaNYL   Transdermal Q72H     sodium chloride (PF)  3 mL Intracatheter Q8H     sucralfate  1 g Oral 4x Daily     vitamin D  50,000 Units Oral Q7 Days     Data     Recent Labs  Lab 06/03/18  0741 06/02/18  0609 06/01/18  1807   WBC 5.7 5.1 4.8   HGB 13.2* 12.7* 13.2*   MCV 98 96 95   * 97* 124*   NA  --  144 145*   POTASSIUM  --  3.3* 3.5   CHLORIDE  --  109 109   CO2  --  29 27   BUN  --  5* 5*   CR  --  0.64* 0.59*   ANIONGAP  --  7 10   SILAS  --  8.1* 8.0*   GLC  --  92 88   ALBUMIN  --   --  3.3*   PROTTOTAL  --   --  6.5*   BILITOTAL  --   --  0.4   ALKPHOS  --   --  86   ALT  --   --  18   AST  --   --  8   TROPI  --   --  <0.015     No results found for this or any previous visit (from the past 24 hour(s)).

## 2018-06-03 NOTE — PHARMACY-VANCOMYCIN DOSING SERVICE
Pharmacy Vancomycin Note  Date of Service Catie 3, 2018  Patient's  1970   47 year old, male    Indication: Bacteremia  Goal Trough Level: 15-20 mg/L  Day of Therapy: 2  Current Vancomycin regimen:  1750 mg (20mg/kg) IV q12h    Current estimated CrCl = Estimated Creatinine Clearance: 197.5 mL/min (based on Cr of 0.6).    Creatinine for last 3 days  2018:  6:07 PM Creatinine 0.59 mg/dL  2018:  6:09 AM Creatinine 0.64 mg/dL  6/3/2018:  7:41 AM Creatinine 0.60 mg/dL    Recent Vancomycin Levels (past 3 days)  6/3/2018: 11:35 AM Vancomycin Level 11.0 mg/L    Vancomycin IV Administrations (past 72 hours)                   vancomycin (VANCOCIN) 1,750 mg in sodium chloride 0.9 % 500 mL intermittent infusion (mg) 1,750 mg New Bag 18 1203     1,750 mg New Bag  0009     1,750 mg New Bag 18 1209    vancomycin (VANCOCIN) 2,000 mg in sodium chloride 0.9 % 500 mL intermittent infusion (mg) 2,000 mg New Bag 18 0010                Nephrotoxins and other renal medications (Future)    Start     Dose/Rate Route Frequency Ordered Stop    18  vancomycin (VANCOCIN) 1,250 mg in sodium chloride 0.9 % 250 mL intermittent infusion      1,250 mg  over 90 Minutes Intravenous EVERY 8 HOURS 18 1359               Contrast Orders - past 72 hours     None          Interpretation of levels and current regimen:  Trough level is  Subtherapeutic    Has serum creatinine changed > 50% in last 72 hours: No    Urine output:  good urine output    Renal Function: Stable    Plan:  1.  Increase Dose to 1250mg (14mg/kg) IV every 8 hours  2.  Pharmacy will check trough levels as appropriate in 1-3 Days.    3. Serum creatinine levels will be ordered daily for the first week of therapy and at least twice weekly for subsequent weeks.      Carlita Hudson, PharmD  Pager [phone]        .

## 2018-06-04 ENCOUNTER — APPOINTMENT (OUTPATIENT)
Dept: INTERVENTIONAL RADIOLOGY/VASCULAR | Facility: CLINIC | Age: 48
DRG: 314 | End: 2018-06-04
Attending: NURSE PRACTITIONER
Payer: COMMERCIAL

## 2018-06-04 LAB
ANION GAP SERPL CALCULATED.3IONS-SCNC: 8 MMOL/L (ref 3–14)
BACTERIA SPEC CULT: ABNORMAL
BASOPHILS # BLD AUTO: 0 10E9/L (ref 0–0.2)
BASOPHILS NFR BLD AUTO: 0.4 %
BUN SERPL-MCNC: 7 MG/DL (ref 7–30)
CALCIUM SERPL-MCNC: 8.4 MG/DL (ref 8.5–10.1)
CHLORIDE SERPL-SCNC: 107 MMOL/L (ref 94–109)
CO2 SERPL-SCNC: 27 MMOL/L (ref 20–32)
CREAT SERPL-MCNC: 0.7 MG/DL (ref 0.66–1.25)
DIFFERENTIAL METHOD BLD: ABNORMAL
EOSINOPHIL # BLD AUTO: 0.3 10E9/L (ref 0–0.7)
EOSINOPHIL NFR BLD AUTO: 4.4 %
ERYTHROCYTE [DISTWIDTH] IN BLOOD BY AUTOMATED COUNT: 13.1 % (ref 10–15)
GFR SERPL CREATININE-BSD FRML MDRD: >90 ML/MIN/1.7M2
GLUCOSE SERPL-MCNC: 88 MG/DL (ref 70–99)
HCT VFR BLD AUTO: 40.8 % (ref 40–53)
HGB BLD-MCNC: 13.2 G/DL (ref 13.3–17.7)
IMM GRANULOCYTES # BLD: 0 10E9/L (ref 0–0.4)
IMM GRANULOCYTES NFR BLD: 0.1 %
INR BLD: 1.1 (ref 0.86–1.14)
INTERPRETATION ECG - MUSE: NORMAL
LYMPHOCYTES # BLD AUTO: 2.7 10E9/L (ref 0.8–5.3)
LYMPHOCYTES NFR BLD AUTO: 35.3 %
Lab: ABNORMAL
MAGNESIUM SERPL-MCNC: 2.1 MG/DL (ref 1.6–2.3)
MCH RBC QN AUTO: 31.2 PG (ref 26.5–33)
MCHC RBC AUTO-ENTMCNC: 32.4 G/DL (ref 31.5–36.5)
MCV RBC AUTO: 97 FL (ref 78–100)
MONOCYTES # BLD AUTO: 0.6 10E9/L (ref 0–1.3)
MONOCYTES NFR BLD AUTO: 7.9 %
NEUTROPHILS # BLD AUTO: 3.9 10E9/L (ref 1.6–8.3)
NEUTROPHILS NFR BLD AUTO: 51.9 %
NRBC # BLD AUTO: 0 10*3/UL
NRBC BLD AUTO-RTO: 0 /100
PHOSPHATE SERPL-MCNC: 4 MG/DL (ref 2.5–4.5)
PLATELET # BLD AUTO: 122 10E9/L (ref 150–450)
POTASSIUM SERPL-SCNC: 4 MMOL/L (ref 3.4–5.3)
RBC # BLD AUTO: 4.23 10E12/L (ref 4.4–5.9)
SODIUM SERPL-SCNC: 143 MMOL/L (ref 133–144)
SPECIMEN SOURCE: ABNORMAL
WBC # BLD AUTO: 7.6 10E9/L (ref 4–11)

## 2018-06-04 PROCEDURE — 25000132 ZZH RX MED GY IP 250 OP 250 PS 637: Performed by: NURSE PRACTITIONER

## 2018-06-04 PROCEDURE — 85610 PROTHROMBIN TIME: CPT | Mod: QW

## 2018-06-04 PROCEDURE — 25000132 ZZH RX MED GY IP 250 OP 250 PS 637: Performed by: PHYSICIAN ASSISTANT

## 2018-06-04 PROCEDURE — 99232 SBSQ HOSP IP/OBS MODERATE 35: CPT | Performed by: PHYSICIAN ASSISTANT

## 2018-06-04 PROCEDURE — 87070 CULTURE OTHR SPECIMN AEROBIC: CPT | Performed by: PHYSICIAN ASSISTANT

## 2018-06-04 PROCEDURE — 87040 BLOOD CULTURE FOR BACTERIA: CPT | Performed by: PHYSICIAN ASSISTANT

## 2018-06-04 PROCEDURE — 25000128 H RX IP 250 OP 636: Performed by: INTERNAL MEDICINE

## 2018-06-04 PROCEDURE — 25000128 H RX IP 250 OP 636: Performed by: PHYSICIAN ASSISTANT

## 2018-06-04 PROCEDURE — 40000556 ZZH STATISTIC PERIPHERAL IV START W US GUIDANCE

## 2018-06-04 PROCEDURE — 83735 ASSAY OF MAGNESIUM: CPT | Performed by: NURSE PRACTITIONER

## 2018-06-04 PROCEDURE — 85025 COMPLETE CBC W/AUTO DIFF WBC: CPT | Performed by: NURSE PRACTITIONER

## 2018-06-04 PROCEDURE — 12000001 ZZH R&B MED SURG/OB UMMC

## 2018-06-04 PROCEDURE — 36415 COLL VENOUS BLD VENIPUNCTURE: CPT | Performed by: PHYSICIAN ASSISTANT

## 2018-06-04 PROCEDURE — 02PYX3Z REMOVAL OF INFUSION DEVICE FROM GREAT VESSEL, EXTERNAL APPROACH: ICD-10-PCS | Performed by: PHYSICIAN ASSISTANT

## 2018-06-04 PROCEDURE — 36589 REMOVAL TUNNELED CV CATH: CPT | Mod: RT

## 2018-06-04 PROCEDURE — 80048 BASIC METABOLIC PNL TOTAL CA: CPT | Performed by: NURSE PRACTITIONER

## 2018-06-04 PROCEDURE — 25000125 ZZHC RX 250: Performed by: PHYSICIAN ASSISTANT

## 2018-06-04 PROCEDURE — 25800025 ZZH RX 258: Performed by: NURSE PRACTITIONER

## 2018-06-04 PROCEDURE — 84100 ASSAY OF PHOSPHORUS: CPT | Performed by: NURSE PRACTITIONER

## 2018-06-04 PROCEDURE — 25000132 ZZH RX MED GY IP 250 OP 250 PS 637: Performed by: INTERNAL MEDICINE

## 2018-06-04 RX ORDER — LIDOCAINE 40 MG/G
CREAM TOPICAL
Status: DISCONTINUED | OUTPATIENT
Start: 2018-06-04 | End: 2018-06-06 | Stop reason: HOSPADM

## 2018-06-04 RX ADMIN — CARVEDILOL 12.5 MG: 12.5 TABLET, FILM COATED ORAL at 09:02

## 2018-06-04 RX ADMIN — ONDANSETRON 4 MG: 2 INJECTION INTRAMUSCULAR; INTRAVENOUS at 23:25

## 2018-06-04 RX ADMIN — CARVEDILOL 12.5 MG: 12.5 TABLET, FILM COATED ORAL at 20:09

## 2018-06-04 RX ADMIN — DEXTROAMPHETAMINE SACCHARATE, AMPHETAMINE ASPARTATE, DEXTROAMPHETAMINE SULFATE AND AMPHETAMINE SULFATE 20 MG: 2.5; 2.5; 2.5; 2.5 TABLET ORAL at 11:58

## 2018-06-04 RX ADMIN — OXYCODONE HYDROCHLORIDE 15 MG: 5 SOLUTION ORAL at 23:17

## 2018-06-04 RX ADMIN — OXYCODONE HYDROCHLORIDE 15 MG: 5 SOLUTION ORAL at 09:03

## 2018-06-04 RX ADMIN — ONDANSETRON 4 MG: 4 TABLET, ORALLY DISINTEGRATING ORAL at 04:23

## 2018-06-04 RX ADMIN — VANCOMYCIN HYDROCHLORIDE 1250 MG: 10 INJECTION, POWDER, LYOPHILIZED, FOR SOLUTION INTRAVENOUS at 06:01

## 2018-06-04 RX ADMIN — DEXTROSE AND SODIUM CHLORIDE: 5; 450 INJECTION, SOLUTION INTRAVENOUS at 02:22

## 2018-06-04 RX ADMIN — SUCRALFATE 1 G: 1 SUSPENSION ORAL at 16:03

## 2018-06-04 RX ADMIN — DIPHENHYDRAMINE HYDROCHLORIDE 25 MG: 25 SOLUTION ORAL at 05:26

## 2018-06-04 RX ADMIN — SUCRALFATE 1 G: 1 SUSPENSION ORAL at 09:01

## 2018-06-04 RX ADMIN — DIPHENHYDRAMINE HYDROCHLORIDE 25 MG: 25 SOLUTION ORAL at 17:36

## 2018-06-04 RX ADMIN — ONDANSETRON 4 MG: 4 TABLET, ORALLY DISINTEGRATING ORAL at 16:07

## 2018-06-04 RX ADMIN — VANCOMYCIN HYDROCHLORIDE 1250 MG: 10 INJECTION, POWDER, LYOPHILIZED, FOR SOLUTION INTRAVENOUS at 18:06

## 2018-06-04 RX ADMIN — OXYCODONE HYDROCHLORIDE 15 MG: 5 SOLUTION ORAL at 16:04

## 2018-06-04 RX ADMIN — SUCRALFATE 1 G: 1 SUSPENSION ORAL at 20:09

## 2018-06-04 RX ADMIN — OXYCODONE HYDROCHLORIDE 15 MG: 5 SOLUTION ORAL at 04:24

## 2018-06-04 NOTE — PROGRESS NOTES
[facility], [place]    Internal Medicine Progress Note - Gold Service      Assessment & Plan   Parker Acevedo Sr is a 47 year old man with a history of multiple abdominal surgeries (RnYGB, gastrojejunostomy, gastrectomy, cholecystectomy, appendectomy), chronic abdominal pain, PUD, and short gut syndrome w/ severe malnutrition on TPN via Cm catheter w/ recurrent line infections admitted 6/1 w/ bacteremia.     #Recurrent Enterococcus Faecalis Line-Associated Bacteremia. Blood cx 5/31 and 6/2 + E faecalis sensitive to vancomycin and ampicillin. 6/3 neg to date. Afebrile, no leukocytosis. TTE 6/3 neg for valvular vegetation. Line pulled today.   - ID following. 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 Would prefer tx w/ ampicillin but awaiting assessment for PCN desensitization.   - Patient known to IR from past admissions. They request at least 48 hrs neg blood cx from today before replacing line. Tip was sent for cx.   - Care coordinator to review sterile dressing changes w/ home care agency as it sounds like this was not being done reliably PTA.     #Severe Malnutrition. Uses G tube (routine replacement here by bariatric surgery 6/3) for venting, not TFs. Has short gut syndrome and is TPN dependent - on holiday d/t line infection.   - Continue maintenance IVF and full liquid diet PO as tolerated (takes very little). Anticipate short TPN holiday w/ line replacement as early as 6/6 but if longer would need to consider PPN.     #NICM. LVEF improved from 45-50% to 60-65% on this echo.   - Continue home Coreg.     Consulting Services: ID    Diet: Bariatric Diet Full Liquids  Fluids: D5 1/2 NS @ 125 cc/hr  DVT Prophylaxis: ambulatory  Code Status: Full Code    #Pain Assessment:  Current Pain Score 6/4/2018   Patient currently in pain? yes   Pain score (0-10) -   Pain location Back   Pain descriptors Aching;Constant   - Parker is experiencing pain  due to chronic pain. Pain management was discussed and the plan was created in a collaborative fashion.  Parker's response to the current recommendations: engaged  - Opioid regimen: Fentanyl patch and oxycodone per pain clinic   - Response to opioid medications: Reduction of symptoms   - Bowel regimen: not needed    Disposition Plan   Expected discharge: 2 - 3 days; recommended to prior living arrangement once line replaced (anticipate ~6/6 if blood cx remain neg) and plan for home abx in place     The patient's care was discussed with bedside RN Nemo, care coordinator, IR, and medicine attending Dr. Armstrong.     Leana Rosales PA-C  Hospitalist Service  [facility]  Pager: [phone]    Interval History   Has been walking more today but feels fatigued. Woke up with a headache and some loose stools but these have improved throughout the day. No f/c. No CP or SOB.     A 4 point ROS was performed (including CV, Resp, GI, ) and negative unless otherwise noted in HPI.     Physical Exam   /82 (BP Location: Right arm)  Pulse 69  Temp 97.6  F (36.4  C) (Oral)  Resp 16  Wt 89.4 kg (197 lb 3.2 oz)  SpO2 99%  BMI 27.5 kg/m2     GENERAL: Alert and oriented x 3. Ambulatory off unit. Appears comfortable. Conversant.    HEENT: Anicteric sclera. Mucous membranes moist. Edentulous.   CV: RRR. S1, S2. No murmurs appreciated.   RESPIRATORY: Effort normal on room air. Lungs CTAB with no wheezing, rales, rhonchi.   GI: Abdomen soft and non distended, bowel sounds present. No tenderness, rebound, guarding.   NEUROLOGICAL: No focal deficits. Moves all extremities.    EXTREMITIES: No peripheral edema. Intact bilateral pedal pulses.   SKIN: No jaundice. No rashes.     Lines/Tubes/Drains  PIV  G tube     Data reviewed today: I reviewed all medications, new labs and imaging results over the last 24 hours.

## 2018-06-04 NOTE — PROCEDURES
Called to see patient for a new piv.  Patient RN at bedside and advising that MD is deciding on midline or PICC placement.  Midline is possible tonight yet, but a PICC would need to wait till Monday.  RN said she would call us back and advise what the decision was.  Thank you.

## 2018-06-04 NOTE — PROCEDURES
Interventional Radiology Brief Post Procedure Note    Procedure: IR CVC TUNNEL REMOVAL RIGHT    Proceduralist: Linda Schaffer MS, BRITTANY    Assistant: None    Time Out: Prior to the start of the procedure and with procedural staff participation, I verbally confirmed the patient s identity using two indicators, relevant allergies, that the procedure was appropriate and matched the consent or emergent situation, and that the correct equipment/implants were available. Immediately prior to starting the procedure I conducted the Time Out with the procedural staff and re-confirmed the patient s name, procedure, and site/side. (The Joint Commission universal protocol was followed.)  Yes    Medications   Medication Event Details Admin User Admin Time       Sedation: None. Local Anesthestic used    Findings: Completed removal of right single lumen tunneled central venous catheter in its entirety. Tip of catheter sent for culture.    Estimated Blood Loss: Minimal    Fluoroscopy Time: 0 minute(s)    SPECIMENS: Catheter tip sent to lab for analysis    Complications: 1. None     Condition: Stable    Plan: Follow up per primary team    Comments: See dictated procedure note for full details.    Linda Schaffer PA-C

## 2018-06-04 NOTE — PROVIDER NOTIFICATION
Referred to Ronnie Looney MD regarding L arm with newly inserted PIV access having pain , tenderness and slightly dusky color finger tips. Linda came to see pt and explained plans to pt and wife.

## 2018-06-04 NOTE — CONSULTS
Patient is on IR schedule 6/4/2018 for a right IJ tunneled line removal.   If patient is NPO, please keep him that way.  Consent will be done prior to procedure.      Please contact the IR charge RN at [phone] for estimated time of procedure.     Case discussed with Dr. Shell from IR and Leana Rosales PA-C.    Patti Garvey, WARD, APRN  Interventional Radiology  Phone: [phone]  Pager: [phone]

## 2018-06-04 NOTE — PROGRESS NOTES
Patient Name: Parker Acevedo Sr  Medical Record Number: [id number]  Today's Date: 6/4/2018    Procedure: tunnel line removal with tip culture  Proceduralist: Linda Schaffer PA-C    No sedation     Procedure start time: 1043  Procedure end time: 1055    Report given to: DB / Goldie RN        Pt arrived to IR room 6 from outside. Pt denies any questions or concerns regarding procedure. Pt positioned supine and monitored per protocol. Pt tolerated procedure without any noted complications.Pt and receiving RN verbalized understanding of pt being up right for two hours. Tip sent to be cultured as ordered.Pt transferred back to .

## 2018-06-05 LAB
CREAT SERPL-MCNC: 0.62 MG/DL (ref 0.66–1.25)
GFR SERPL CREATININE-BSD FRML MDRD: >90 ML/MIN/1.7M2
GLUCOSE BLDC GLUCOMTR-MCNC: 83 MG/DL (ref 70–99)
VANCOMYCIN SERPL-MCNC: 16.2 MG/L

## 2018-06-05 PROCEDURE — 25000128 H RX IP 250 OP 636: Performed by: INTERNAL MEDICINE

## 2018-06-05 PROCEDURE — 99232 SBSQ HOSP IP/OBS MODERATE 35: CPT | Performed by: PHYSICIAN ASSISTANT

## 2018-06-05 PROCEDURE — 25000132 ZZH RX MED GY IP 250 OP 250 PS 637: Performed by: PHYSICIAN ASSISTANT

## 2018-06-05 PROCEDURE — 25000125 ZZHC RX 250: Performed by: PHYSICIAN ASSISTANT

## 2018-06-05 PROCEDURE — 00000146 ZZHCL STATISTIC GLUCOSE BY METER IP

## 2018-06-05 PROCEDURE — 40000556 ZZH STATISTIC PERIPHERAL IV START W US GUIDANCE

## 2018-06-05 PROCEDURE — 25000132 ZZH RX MED GY IP 250 OP 250 PS 637: Performed by: INTERNAL MEDICINE

## 2018-06-05 PROCEDURE — 80202 ASSAY OF VANCOMYCIN: CPT | Performed by: INTERNAL MEDICINE

## 2018-06-05 PROCEDURE — 12000001 ZZH R&B MED SURG/OB UMMC

## 2018-06-05 PROCEDURE — 25000132 ZZH RX MED GY IP 250 OP 250 PS 637: Performed by: NURSE PRACTITIONER

## 2018-06-05 PROCEDURE — 36415 COLL VENOUS BLD VENIPUNCTURE: CPT | Performed by: INTERNAL MEDICINE

## 2018-06-05 PROCEDURE — 25800025 ZZH RX 258: Performed by: NURSE PRACTITIONER

## 2018-06-05 PROCEDURE — 82565 ASSAY OF CREATININE: CPT | Performed by: INTERNAL MEDICINE

## 2018-06-05 RX ORDER — HEPARIN SODIUM,PORCINE 10 UNIT/ML
2-5 VIAL (ML) INTRAVENOUS
Status: DISCONTINUED | OUTPATIENT
Start: 2018-06-05 | End: 2018-06-06 | Stop reason: HOSPADM

## 2018-06-05 RX ORDER — TRAZODONE HYDROCHLORIDE 50 MG/1
50 TABLET, FILM COATED ORAL
Status: DISCONTINUED | OUTPATIENT
Start: 2018-06-05 | End: 2018-06-06 | Stop reason: HOSPADM

## 2018-06-05 RX ORDER — LIDOCAINE 40 MG/G
CREAM TOPICAL
Status: DISCONTINUED | OUTPATIENT
Start: 2018-06-05 | End: 2018-06-06 | Stop reason: HOSPADM

## 2018-06-05 RX ADMIN — CARVEDILOL 12.5 MG: 12.5 TABLET, FILM COATED ORAL at 09:34

## 2018-06-05 RX ADMIN — VANCOMYCIN HYDROCHLORIDE 1250 MG: 10 INJECTION, POWDER, LYOPHILIZED, FOR SOLUTION INTRAVENOUS at 09:53

## 2018-06-05 RX ADMIN — TRAZODONE HYDROCHLORIDE 50 MG: 50 TABLET ORAL at 22:56

## 2018-06-05 RX ADMIN — DIPHENHYDRAMINE HYDROCHLORIDE 25 MG: 25 SOLUTION ORAL at 02:35

## 2018-06-05 RX ADMIN — OXYCODONE HYDROCHLORIDE 15 MG: 5 SOLUTION ORAL at 03:17

## 2018-06-05 RX ADMIN — CARVEDILOL 12.5 MG: 12.5 TABLET, FILM COATED ORAL at 20:26

## 2018-06-05 RX ADMIN — OXYCODONE HYDROCHLORIDE 15 MG: 5 SOLUTION ORAL at 15:02

## 2018-06-05 RX ADMIN — OXYCODONE HYDROCHLORIDE 15 MG: 5 SOLUTION ORAL at 22:56

## 2018-06-05 RX ADMIN — SUCRALFATE 1 G: 1 SUSPENSION ORAL at 09:35

## 2018-06-05 RX ADMIN — VANCOMYCIN HYDROCHLORIDE 1250 MG: 10 INJECTION, POWDER, LYOPHILIZED, FOR SOLUTION INTRAVENOUS at 03:14

## 2018-06-05 RX ADMIN — SUCRALFATE 1 G: 1 SUSPENSION ORAL at 20:26

## 2018-06-05 RX ADMIN — DEXTROSE AND SODIUM CHLORIDE: 5; 450 INJECTION, SOLUTION INTRAVENOUS at 11:38

## 2018-06-05 RX ADMIN — OXYCODONE HYDROCHLORIDE 15 MG: 5 SOLUTION ORAL at 09:36

## 2018-06-05 RX ADMIN — SUCRALFATE 1 G: 1 SUSPENSION ORAL at 16:00

## 2018-06-05 RX ADMIN — FENTANYL 1 PATCH: 12 PATCH TRANSDERMAL at 22:57

## 2018-06-05 RX ADMIN — DIPHENHYDRAMINE HYDROCHLORIDE 25 MG: 25 SOLUTION ORAL at 09:33

## 2018-06-05 RX ADMIN — ONDANSETRON 4 MG: 4 TABLET, ORALLY DISINTEGRATING ORAL at 16:00

## 2018-06-05 RX ADMIN — VANCOMYCIN HYDROCHLORIDE 1250 MG: 10 INJECTION, POWDER, LYOPHILIZED, FOR SOLUTION INTRAVENOUS at 19:14

## 2018-06-05 RX ADMIN — FENTANYL 1 PATCH: 25 PATCH, EXTENDED RELEASE TRANSDERMAL at 22:56

## 2018-06-05 RX ADMIN — DEXTROAMPHETAMINE SACCHARATE, AMPHETAMINE ASPARTATE, DEXTROAMPHETAMINE SULFATE AND AMPHETAMINE SULFATE 20 MG: 2.5; 2.5; 2.5; 2.5 TABLET ORAL at 09:33

## 2018-06-05 RX ADMIN — DIPHENHYDRAMINE HYDROCHLORIDE 25 MG: 25 SOLUTION ORAL at 17:32

## 2018-06-05 ASSESSMENT — PAIN DESCRIPTION - DESCRIPTORS
DESCRIPTORS: ACHING
DESCRIPTORS: CRAMPING
DESCRIPTORS: ACHING

## 2018-06-05 NOTE — PROGRESS NOTES
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     DISCUSSION:   47 year old male with PMH  of abdominal surgeries with short gut syndrome on chronic TPN via Azevedo catheter, hx recurrent bacteremia/line infections admitted on  with enterococcus faecalis azevedo infection. From an infectious standpoint, best way to cure infection is to remove his line but understand that he's already gone through several lines due to infection. Should he want to try and keep his line, would add vancomycin lock in addition to the IV vancomycin. Ideally we could get penicillin allergy testing, but it may not be possible during this hospitalization because Parker has been reliant on benadryl for his Vancomycin dosing and this will interfere with the interpretation of penicillin allergy testing. Ideally, we would verify that he has cleared his bacteremia before replacing a long term line.  I will plan to see him in our ID clinic to arrange this.     Addendum: Follow up is scheduled with Dr. Thompson on 18 at 10:00AM at the [facility] (3rd floor).    Attestation:  This patient has been seen and evaluated by me, Gabbi Norris MD. The plan was  discussed with the patient and the primary team. I have reviewed today's vital signs, medications, labs and imaging.     Gabbi Thompson MD MPH  Infectious Diseases  Pager [phone]      Interval History:  Mr. Acevedo is doing well - has been up and walking around.  His fatigue is much improved. In a discussion with Mr. Acevedo and his wife, there is some real confusion about the care of his port. He denies fever or chills.  No rashes.  No significant changes in his stools. His port removal site is feeling fine.      HPI:   Mr Acevedo is a 47 year old male with PMH of abdominal surgeries with short gut syndrome on chronic TPN via Cm catheter, hx recurrent bacteremia/line infections, and PUD admitted on 6/1 after outpatient positive blood cultures. Around 4 days ago febrile to 102 with chills, nausea, non bloody vomiting, and muscles aches. These are consistent with prior symptoms of bacteremia. He's not sure but thinks he could have been having symptoms for a bit longer. In January, hospitalized for e coli port a cath infection and had enterococcus bacteremia at the same time. Port removed, given 14 days of vancomycin and ceftriaxone and a cm catheter was placed. Recently was told that he doesn't need to practice sterile techniques and so has not been washing hands, etc when accessing his line. He is feeling a little better after IV antibiotics, no fevers but continues to feel a little fatigued. He's unsure if he wants to treat through his line infection or wants to have his line removed.     Medications:  Current Facility-Administered Medications   Medication     acetaminophen (TYLENOL) tablet 1,000 mg     albuterol (PROAIR HFA/PROVENTIL HFA/VENTOLIN HFA) Inhaler 2 puff     amphetamine-dextroamphetamine (ADDERALL) per tablet 20 mg     carvedilol (COREG) tablet 12.5 mg     dextrose 5% and 0.45% NaCl infusion     vancomycin (VANCOCIN) 1,250 mg in sodium chloride 0.9 % 250 mL intermittent  infusion    And     diphenhydrAMINE (BENADRYL) solution 25 mg     fentaNYL (DURAGESIC) 12 mcg/hr 72 hr patch 1 patch     fentaNYL (DURAGESIC) 25 mcg/hr 72 hr patch 1 patch     fentaNYL (DURAGESIC) Patch in Place     fentaNYL (DURAGESIC) patch REMOVAL     heparin lock flush 10 UNIT/ML injection 2-5 mL     heparin lock flush 10 UNIT/ML injection 5-10 mL     heparin lock flush 10 UNIT/ML injection 5-10 mL     lidocaine (LMX4) kit     lidocaine (LMX4) kit     lidocaine (LMX4) kit     lidocaine (LMX4) kit     lidocaine 1 % 0.5-5 mL     lidocaine 1 % 1 mL     lidocaine 1 % 1 mL     lidocaine 1 % 1 mL     melatonin tablet 1 mg     naloxone (NARCAN) injection 0.1-0.4 mg     ondansetron (ZOFRAN-ODT) ODT tab 4 mg    Or     ondansetron (ZOFRAN) injection 4 mg     oxyCODONE (ROXICODONE) solution 15 mg     sodium chloride (PF) 0.9% PF flush 10-20 mL     sodium chloride (PF) 0.9% PF flush 3 mL     sodium chloride (PF) 0.9% PF flush 3 mL     sodium chloride (PF) 0.9% PF flush 3 mL     sodium chloride (PF) 0.9% PF flush 5-50 mL     sucralfate (CARAFATE) suspension 1 g     traZODone (DESYREL) tablet 50 mg     vitamin D (ERGOCALCIFEROL) capsule 50,000 Units     ROS:  A ten point review of systems was obtained and was negative with the exception of that which is described in the HPI.    EXAMINATION:  /73 (BP Location: Right arm)  Pulse 55  Temp 98  F (36.7  C) (Oral)  Resp 16  Wt 88.5 kg (195 lb 1.6 oz)  SpO2 100%  BMI 27.21 kg/m2  GENERAL:  Well-developed, well-nourished, in bed in no acute distress.   EYES:  Eyes have anicteric sclerae without conjunctival injection.  SKIN:  No acute rashes. Right check azevedo c/d/i, no erythema or TTP  NEUROLOGIC:  Grossly nonfocal. Active x4 extremities.  PSYCH: Appropriate affect. Alert and oriented to person, place and time.    RELEVANT DATA:     BASIC LABS   The following basic labs were personally reviewed:    Inflammatory Markers    Recent Labs   Lab Test  06/02/18   0609   06/01/18   1807  02/16/18   1515  02/12/18   1400  01/23/18   0845  01/20/18   1030  01/16/18   0717  01/15/18   0801   09/24/17   1900   06/28/17   2222  03/12/17   0351   11/01/16   1530   04/20/16   1530  04/11/16   1842   SED   --    --    --    --   10  11   --    --    --   31*   --   26*  17*   --   27*   --   34*  11   CRP  30.0*  26.0*  8.2*  <2.9  <2.9  5.4  26.0*  45.0*   < >  26.6*   < >  53.0*  3.4   < >  8.4*   < >  12.3*  80.0*    < > = values in this interval not displayed.       Hematology Studies    Recent Labs   Lab Test  06/04/18   0753  06/03/18   0741  06/02/18   0609  06/01/18   1807  05/23/18   0930  04/25/18   2335  04/23/18   1500  03/30/18   1215   WBC  7.6  5.7  5.1  4.8  5.4  6.3  6.3  6.6   ANEU  3.9   --    --   3.1  2.8  2.9  3.5  3.8   AEOS  0.3   --    --   0.1  0.3  0.3  0.2  0.2   HGB  13.2*  13.2*  12.7*  13.2*  12.7*  13.6  13.5  13.3   MCV  97  98  96  95  97  96  98  99   PLT  122*  111*  97*  124*  143*  130*  141*  159     Metabolic Studies     Recent Labs   Lab Test  06/05/18   1658  06/04/18   0753  06/03/18   0741  06/02/18   0609  06/01/18   1807  05/23/18   0930   NA   --   143  142  144  145*  145*   POTASSIUM   --   4.0  3.7  3.3*  3.5  3.4   CHLORIDE   --   107  110*  109  109  109   CO2   --   27  21  29  27  30   BUN   --   7  7  5*  5*  9   CR  0.62*  0.70  0.60*  0.64*  0.59*  0.67   GFRESTIMATED  >90  >90  >90  >90  >90  >90       Hepatic Studies    Recent Labs   Lab Test  06/01/18   1807  05/23/18   0930  04/23/18   1500  03/30/18   1215  02/26/18   1430  02/12/18   1400   BILITOTAL  0.4  0.5  0.4  0.3  0.5  0.5   ALKPHOS  86  81  81  79  69  76   ALBUMIN  3.3*  3.3*  3.7  3.4  3.4  3.9   AST  8  12  15  19  11  13   ALT  18  26  27  25  20  33       Thyroid Studies    Recent Labs   Lab Test  11/03/14   1355  05/20/14   1252   TSH  0.96  1.39       MICROBIOLOGY LABS  The following microbiology studies were personally reviewed:  Culture Micro   Date Value Ref  Range Status   06/04/2018 No growth after 17 hours  Preliminary   06/04/2018 No growth after 17 hours  Preliminary   06/04/2018 Culture negative monitoring continues  Preliminary   06/02/2018 Canceled, Test credited  Final   06/02/2018 Canceled via EPIC interface  Final   06/02/2018 Canceled, Test credited  Final   06/02/2018 Canceled via EPIC interface  Final   06/01/2018 No growth after 4 days  Preliminary   06/01/2018 (A)  Final    Cultured on the 1st day of incubation:  Enterococcus faecalis     06/01/2018   Final    Critical Value/Significant Value, preliminary result only, called to and read back by  ROGER MARTIN RN U5A 0455 06.02.18      06/01/2018 Susceptibility testing done on previous specimen  Final   05/31/2018 (A)  Preliminary    Cultured on the 1st day of incubation:  Enterococcus faecalis  Susceptibility testing done on previous specimen     05/31/2018   Preliminary    Critical Value/Significant Value, preliminary result only, called to and read back by  Savannah Solitario RN from [facility]. 6.1.18 at 0525. GR.     05/31/2018 (A)  Preliminary    Cultured on the 1st day of incubation:  Staphylococcus epidermidis  Susceptibility testing in progress     05/31/2018   Preliminary    Critical Value/Significant Value called to and read back by  Maday Tan RN 5A at 1215 on 6/4/18 by ALMA.     05/31/2018 (A)  Preliminary    Cultured on the 1st day of incubation:  Enterococcus faecalis     05/31/2018   Preliminary    Critical Value/Significant Value, preliminary result only, called to and read back by  Savannah Jarrett, on call RN for [facility]. 6.1.18 at 0237. GR.      05/31/2018 (A)  Preliminary    Cultured on the 1st day of incubation:  Staphylococcus haemolyticus  Susceptibility testing in progress     05/31/2018   Preliminary    Critical Value/Significant Value called to and read back by  Maday Tan RN at 1515 on 6/4/18 by ALMA.     05/31/2018   Preliminary    (Note)  POSITIVE for ENTEROCOCCUS FAECALIS  and NEGATIVE for Melvin/vanB genes  by Verigene multiplex nucleic acid test. Final identification and  antimicrobial susceptibility testing will be verified by standard  methods.    Specimen tested with Verigene multiplex, gram-positive blood culture  nucleic acid test for the following targets: Staph aureus, Staph  epidermidis, Staph lugdunensis, other Staph species, Enterococcus  faecalis, Enterococcus faecium, Streptococcus species, S. agalactiae,  S. anginosus grp., S. pneumoniae, S. pyogenes, Listeria sp., mecA  (methicillin resistance) and Melvin/B (vancomycin resistance).    Critical Value/Significant Value called to and read back by Savannah Jarrett RN from [facility]. 6.1.18 at 0521. GR.     01/15/2018 <15 colonies   Escherichia coli   (A)  Final   01/14/2018 No growth  Final   01/14/2018 No growth  Final   01/13/2018 (A)  Final    Cultured on the 1st day of incubation:  Enterococcus faecalis  Susceptibility testing done on previous specimen     01/13/2018 (A)  Final    Upon further review of the original slide, no gram negative rods are seen  Previously reported as:  Cultured on the 1st day of incubation:  Gram negative rods  CORRECTED ON:  1.20.18 @1407 AV     01/13/2018   Final    Critical Value/Significant Value, preliminary result only, called to and read back by  Taylor Bhatt RN from U7B. 1.14.18 at 0111. GR.     01/13/2018   Final    Corrected report called to and read back by  DENNIS Dooley 1.20.18 @1410 AV     01/13/2018 (A)  Final    Cultured on the 1st day of incubation:  Enterococcus faecalis  Susceptibility testing done on previous specimen     01/13/2018   Final    Critical Value/Significant Value, preliminary result only, called to and read back by  Shorty Montgomery RN at 1953 on 01.13.18 by stephanie     01/13/2018 (A)  Final    Cultured on the 1st day of incubation:  Staphylococcus hominis     01/13/2018   Final    Critical Value/Significant Value called to and read back by  Kasia Jones RN on 1.16.2018  at 1114. KVO     01/12/2018 (A)  Final    Cultured on the 1st day of incubation:  Escherichia coli  Susceptibility testing done on previous specimen     01/12/2018 (A)  Final    Cultured on the 1st day of incubation:  Enterococcus faecalis     01/12/2018   Final    Critical Value/Significant Value, preliminary result only, called to and read back by  Shorty Gardner RN 7B @ 0836. 01/13/18 01/12/2018   Final    (Note)  POSITIVE for ENTEROCOCCUS FAECALIS and NEGATIVE for Melvin/vanB genes  by Verigene multiplex nucleic acid test. Final identification and  antimicrobial susceptibility testing will be verified by standard  methods.    Specimen tested with Verigene multiplex, gram-positive blood culture  nucleic acid test for the following targets: Staph aureus, Staph  epidermidis, Staph lugdunensis, other Staph species, Enterococcus  faecalis, Enterococcus faecium, Streptococcus species, S. agalactiae,  S. anginosus grp., S. pneumoniae, S. pyogenes, Listeria sp., mecA  (methicillin resistance) and Melvin/B (vancomycin resistance).    Critical Value/Significant Value called to and read back by Shorty Gardner RN 7B @ 1129. 01/13/18 01/12/2018 No growth  Final   01/12/2018 No growth  Final   01/12/2018 (A)  Final    Cultured on the 1st day of incubation:  Escherichia coli     01/12/2018   Final    Critical Value/Significant Value, preliminary result only, called to and read back by  Dr Jeet Orozco  in the [facility] at 8:20am 1/12/2018 ()     01/12/2018   Final    (Note)  POSITIVE for E.COLI by Verigene multiplex nucleic acid test. Final  identification and antimicrobial susceptibility testing will be  verified by standard methods. Verigene test will not distinguish  E.coli from Shigella species including S.dysenteriae, S.flexneri,  S.boydii, and S.sonnei. Specimens containing Shigella species or  E.coli will be reported as Positive for E.coli.    Specimen tested with Verigene multiplex, gram-negative blood  culture  nucleic acid test for the following targets: Acinetobacter sp.,  Citrobacter sp., Enterobacter sp., Proteus sp., E. coli, K.  pneumoniae/oxytoca, P. aeruginosa, and the following resistance  markers: CTXM, KPC, NDM, VIM, IMP and OXA.    Critical Value/Significant Value called to and read back by Dr. Jeet Orozco at 1042 on 1.12.18.KD     09/24/2017 No growth  Final   09/24/2017 No growth  Final   09/23/2017 No growth  Final   09/23/2017 No growth  Final   09/22/2017 No growth  Final   09/22/2017 No growth  Final   09/22/2017 No growth  Final   09/21/2017 No growth  Final   09/21/2017 (A)  Final    Cultured on the 1st day of incubation:  Streptococcus mitis group  Identification obtained by MALDI-TOF mass spectrometry research use only database. Test   characteristics determined and verified by the [facility]   ([facility]) [place], [place].     09/21/2017   Final    Critical Value/Significant Value, preliminary result only, called to and read back by  Nathaly Yo RN on 9/22/2017 @2011, tk     09/21/2017 (A)  Final    Cultured on the 1st day of incubation:  Streptococcus salivarius  Susceptibility testing done on previous specimen     09/21/2017   Final    Critical Value/Significant Value, preliminary result only, called to and read back by  Karine Meyers RN U5B 0400 09.22.17 CF     09/21/2017 (A)  Final    Cultured on the 1st day of incubation:  Rothia mucilaginosa  Susceptibility testing done on previous specimen     09/21/2017 (A)  Final    Cultured on the 1st day of incubation:  Staphylococcus epidermidis     09/21/2017 No growth  Final   09/21/2017 No growth  Final   09/20/2017 No growth  Final   09/20/2017 No growth  Final   09/19/2017 No growth  Final   09/19/2017 (A)  Preliminary    Cultured on the 2nd day of incubation:  Streptococcus salivarius  Susceptibility testing done on previous specimen     09/19/2017   Preliminary    Critical Value/Significant Value, preliminary result  only, called to and read back by  Annel Hirsch RN at 0814 on 9.20.17.KD      09/19/2017 (A)  Preliminary    Cultured on the 2nd day of incubation:  Gram positive cocci in clusters  Susceptibility testing done on previous specimen     09/19/2017 Referred to research section for identification  Preliminary   09/19/2017 (A)  Final    Cultured on the 1st day of incubation:  Streptococcus salivarius     09/19/2017   Final    Critical Value/Significant Value, preliminary result only, called to and read back by  Dr. Jimenez, from [facility]. 09.19.17 at 1357. GR.     09/19/2017 (A)  Final    Cultured on the 1st day of incubation:  Streptococcus salivarius  Susceptibility testing done on previous specimen     09/19/2017   Final    Critical Value/Significant Value, preliminary result only, called to and read back by  Dr. Jimenez from [facility]. 09.19.17 at 1041. GR.     09/19/2017 (A)  Final    Cultured on the 1st day of incubation:  Streptococcus mitis group     09/19/2017 (A)  Final    Cultured on the 1st day of incubation:  Rothia mucilaginosa     09/19/2017   Final    Critical Value/Significant Value called to and read back by  KACIE Rose RN on 9.23.17 at 1101. bw     09/19/2017   Final    (Note)  NEGATIVE for the following: Staphylococcus spp., Staph aureus, Staph  epidermidis, Staph lugdunensis, Streptococcus spp., Strep pneumoniae,  Strep pyogenes, Strep agalactiae, Strep anginosus group, Enterococcus  faecalis, Enterococcus faecium, and Listeria spp. by [employer]  multiplex nucleic acid test. Final identification and antimicrobial  susceptibility testing will be verified by standard methods.    Critical Value/Significant Value called to and read back by Dr. Jimenez from [facility]. 09.19.17 at 1357. GR.     08/01/2017 No growth  Final   06/28/2017 No growth  Final   06/28/2017 No growth  Final   06/26/2017 No growth  Final   06/26/2017 (A)  Final    Cultured on the 1st day of incubation: Streptococcus salivarius group  Cultured on the  1st day of incubation: Staphylococcus epidermidis  Critical Value/Significant Value, preliminary result only, called to and read   back by  Dr. Francis Vyas @1652 6/27/17. ct  Cultured on the 1st day of incubation: Rothia mucilaginosa  (Note)  POSITIVE for STAPHYLOCOCCUS EPIDERMIDIS and POSITIVE for the mecA  gene (resistant to methicillin) by [employer] multiplex nucleic acid  test. Final identification and antimicrobial susceptibility testing  will be verified by standard methods.    Specimen tested with Verigene multiplex, gram-positive blood culture  nucleic acid test for the following targets: Staph aureus, Staph  epidermidis, Staph lugdunensis, other Staph species, Enterococcus  faecalis, Enterococcus faecium, Streptococcus species, S. agalactiae,  S. anginosus grp., S. pneumoniae, S. pyogenes, Listeria sp., mecA  (methicillin resistance) and Melvin/B (vancomycin resistance).    Critical Value/Significant Value called to and read back by Dr. Vyas @ 1942 6/27/17 CS       04/11/2017 No growth  Final   04/09/2017 No growth  Final   04/09/2017 No growth  Final   03/12/2017 No growth  Final   03/12/2017 No growth  Final   03/08/2017 No growth  Final   03/07/2017 No growth  Final   03/07/2017 No growth  Final   10/30/2016 No growth  Final   10/29/2016 No growth  Final   10/28/2016 No growth  Final   10/27/2016 15 to 50 colonies Staphylococcus epidermidis (A)  Final   10/27/2016 (A)  Final    Cultured on the 1st day of incubation: Staphylococcus epidermidis Susceptibility   testing done on previous specimen  Critical Value/Significant Value, preliminary result only, called to and read   back by Shahrzad Long RN at 0028 10.28.16.DK     10/27/2016 No growth  Final   10/26/2016 (A)  Final    Cultured on the 1st day of incubation: Staphylococcus epidermidis Susceptibility   testing done on previous specimen  Critical Value/Significant Value called to and read back by Soraida Stafford RN U5A   10/27/16 0136 CF      10/26/2016 (A)  Final    Cultured on the 1st day of incubation: Staphylococcus epidermidis Susceptibility   testing done on previous specimen  Critical Value/Significant Value, preliminary result only, called to and read   back by Karis Nicole RN at 1553 on 10.26.16.KD     10/25/2016 (A)  Final    Cultured on the 1st day of incubation: Staphylococcus epidermidis Susceptibility   testing done on previous specimen  Critical Value/Significant Value, preliminary result only, called to and read   back by Tanya Acosta RN 0258 10/26/16. MS     10/25/2016 (A)  Final    Cultured on the 1st day of incubation: Staphylococcus epidermidis Susceptibility   testing done on previous specimen  Critical Value/Significant Value, preliminary result only, called to and read   back by  Goldie Serna RN on 5A, 10/26/16 @ 1359 lm     10/24/2016 (A)  Final    Cultured on the 1st day of incubation: Staphylococcus epidermidis Susceptibility   testing done on previous specimen  Critical Value/Significant Value, preliminary result only, called to and read   back by  SREE HERNANDES CH RN (5A). 10.25.16 2204 GJS     10/24/2016 (A)  Final    Cultured on the 1st day of incubation: Staphylococcus epidermidis  Critical Value/Significant Value called to and read back by Jyothi Goins RN   [facility] 10/25/16 at 0550 CF  (Note)  POSITIVE for STAPHYLOCOCCUS EPIDERMIDIS and NEGATIVE for the mecA  gene (not resistant to methicillin) by Verigene nucleic acid test.  The mecA gene was not detected. Final identification and  antimicrobial susceptibility testing will be verified by standard  methods.    Specimen tested with Verigene multiplex, gram-positive blood culture  nucleic acid test for the following targets: Staph aureus, Staph  epidermidis, Staph lugdunensis, other Staph species, Enterococcus  faecalis, Enterococcus faecium, Streptococcus species, S. agalactiae,  S. anginosus grp., S. pneumoniae, S. pyogenes, Listeria sp., mecA  (methicillin resistance)  and Melvin/B (vancomycin resistance).    Critical Value/Significant Value called to and read back by Shruthi Salas RN 10.25.16 at 0823am NK       08/10/2016 (A)  Final    Light growth Coagulase negative Staphylococcus  Susceptibility testing not routinely done     08/05/2016 No growth  Final   08/05/2016 No growth  Final   04/15/2016 No growth  Final       Urine Studies    Recent Labs   Lab Test  06/01/18   2021  01/12/18   0042  09/19/17   0242  08/01/17   1133  06/28/17   2339   LEUKEST  Negative  Negative  Negative  Negative  Negative   WBCU  <1  <1  2  O - 2  0       Vancomycin Levels    Recent Labs   Lab Test  06/05/18   1658  06/03/18   1135  01/26/18   0900   VANCOMYCIN  16.2  11.0  9.1     Hepatitis B Testing   Recent Labs   Lab Test  09/20/17   0549   HEPBANG  Nonreactive     Hepatitis C Testing     Hepatitis C Antibody   Date Value Ref Range Status   09/20/2017 Nonreactive NR^Nonreactive Final     Comment:     Assay performance characteristics have not been established for newborns,   infants, and children       IMAGING RESULTS  Recent Results (from the past 48 hour(s))   IR CVC Tunnel Removal Right    Narrative    PRE-OPERATIVE DIAGNOSIS:  Bacteremia    POST-OPERATIVE DIAGNOSIS:  Same    PROCEDURE:  Removal of tunneled central venous catheter    Impression    IMPRESSION:  Completed removal of right single lumen tunneled central  venous catheter.  There were no complications. Catheter tip sent to  lab for culture as requested.    ----------    CLINICAL HISTORY:  The patient has a right tunneled central venous  catheter. He now with bacteremia and request for line removal with tip  of catheter sent to lab for culture. Patient states he was told  dictated on sterile dressing changes after the sutures were removed  from his catheter. We discussed that the suture can stay in place  around the catheter to keep it secured and to continue doing sterile  dressing changes should he have another line.    PERFORMED  BY:  Linda Schaffer PA-C    MEDICATIONS:  A 10:1 volume mixture of 1% lidocaine without  epinephrine buffered with 8.4% bicarbonate solution was available for  local anesthesia.  No intravenous medications for sedation were  utilized.    DESCRIPTION:  Physical examination demonstrated no erythema,  tenderness, fluctuance, or discharge at the catheter exit site or  along the tract.  The catheter and its entry site into the skin was  prepped and draped in usual sterile fashion.     Lidocaine mixture and blunt dissection were used around the catheter  and subcutaneous cuff.     Using steady gentle traction, the catheter and cuff were freed from  the subcutaneous tissue, and the entire catheter was removed without  difficulty.  Compression was applied over the venipuncture site, as  well as along the tract, until good hemostasis was achieved.  A  sterile dressing was applied to the skin at the exit site.    COMPLICATIONS:  No immediate concerns; the patient remained stable  throughout the procedure and tolerated it well.    ESTIMATED BLOOD LOSS:  Minimal    SPECIMENS:  Catheter removed per above and sent to lab for analysis as  requested    TIME:  A total of 15 minutes was spent with the patient. Greater than  50% of the time was spent in counseling, education, and coordination  of care.    --------    INSTRUCTIONS GIVEN TO PATIENT:  Keep site clean, dry, and covered for  72 hours.  Change the dressing if it becomes soiled, wet, or blood  soaked.  After 72 hours the dressing may be removed and the site may  be left uncovered and may get wet if the site has sealed.  If the site  has not sealed, keep it covered and dry with daily dressing changes  until sealed.  Monitor the neck over the collar bone for swelling and  the chest exit site for bleeding or infection as instructed.   Telephone Interventional Radiology daytime [phone], or [facility]   after hours [phone] (and ask for IR on-call), if  problems or  concerns develop.  If the site bleeds, sit upright and  hold pressure on the site and above the collar bone for 10 minutes.   If it continues to bleed, continue holding pressure and telephone  Interventional Radiology at the number(s) above.    KYRA POTTER PA-C

## 2018-06-05 NOTE — PROGRESS NOTES
Care Coordinator Progress Note    Admission Date/Time:  6/1/2018  Attending MD:  Paul Armstrong MD    Data  Chart reviewed, discussed with interdisciplinary team.   Patient was admitted for: Bacteremia.    Concerns with insurance coverage for discharge needs: None.  Current Living Situation: Patient lives with spouse.  Support System: Supportive and Involved  Services Involved: Home Infusion  Transportation at Discharge: Family or friend will provide  Transportation to Medical Appointments:   - Not applicable  Barriers to Discharge: new line placement    Coordination of Care and Referrals: Provided patient/family with options for Home Infusion.        Assessment  Patient admitted with positive blood cultures drawn PTA. History of multiple abdominal surgeries, chronic abdominal pain, PUD, severe malnutrition, and short gut syndrome.  Patients port has been removed and is on a line holiday. New one to be placed tomorrow. Met with patient at bedside to discuss discharge planning and needs. Patient lives at home with his spouse. Open to [facility] for TPN. [facility] updated and aware of admission. TPN recipe to be faxed to them at discharge. Antibiotic plan at this time is unknown. Patient states that he has done IV antibiotics at home in the past and if needed, would like to use [facility] for those as well.   Spoke with [facility] liaison Shaylee about home line dressing changes. They are being done by a HI RN. Explained concern with sterile dressing changes and she will speak to office.      Plan  Anticipated Discharge Date:  TBD  Anticipated Discharge Plan:  Home with home infusion    Carlita Posadas RN

## 2018-06-05 NOTE — PROGRESS NOTES
[facility], [place]    Internal Medicine Progress Note - Gold Service      Assessment & Plan   Parker Acevedo Sr is a 47 year old man with a history of multiple abdominal surgeries (RnYGB, gastrojejunostomy, gastrectomy, cholecystectomy, appendectomy), chronic abdominal pain, PUD, and short gut syndrome w/ severe malnutrition on TPN via Cm catheter w/ recurrent line infections admitted 6/1 w/ bacteremia.     #Recurrent Enterococcus Faecalis Line-Associated Bacteremia. Blood cx 5/31 and 6/2 + E faecalis sensitive to vancomycin and ampicillin. 6/3 neg to date. Afebrile, no leukocytosis. TTE 6/3 neg for valvular vegetation. Line pulled 6/4.   - ID following. 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 PICC can be placed tomorrow if blood cx remain neg. Will complete 2 week course (through 6/16) and have ID clinic f/u.   - Considered PCN skin testing but cannot do while on Benadryl so ID will arrange as OP.   - Care coordinator to review sterile dressing changes w/ home care agency as it sounds like this was not being done reliably PTA.     #Severe Malnutrition. Uses G tube (routine replacement here by bariatric surgery 6/3) for venting, not TFs. Has short gut syndrome and is TPN dependent - on holiday d/t line infection.   - Continue maintenance IVF and full liquid diet PO as tolerated (takes very little).  - Can resume home TPN after PICC line placed tomorrow 6/6.     #NICM. LVEF improved from 45-50% to 60-65% on this echo.   - Continue home Coreg.     Consulting Services: ID    Diet: Bariatric Diet Full Liquids  Fluids: D5 1/2 NS @ 125 cc/hr  DVT Prophylaxis: ambulatory  Code Status: Full Code    #Pain Assessment:  Current Pain Score 6/5/2018   Patient currently in pain? yes   Pain score (0-10) -   Pain location Back   Pain descriptors Aching;Discomfort   - Parker is experiencing pain due to chronic pain. Pain management was discussed and the  plan was created in a collaborative fashion.  Parker's response to the current recommendations: engaged  - Opioid regimen: Fentanyl patch and oxycodone per pain clinic   - Response to opioid medications: Reduction of symptoms   - Bowel regimen: not needed    Disposition Plan   Expected discharge: Tomorrow; recommended to prior living arrangement w/ PICC line to be placed 6/6 and home IV abx    The patient's care was discussed with bedside RN Nemo, care coordinator, IR, ID staff, and medicine attending Dr. Concepcion.     Leana Rosales PA-C  Hospitalist Service  [facility]  Pager: [phone]    Interval History   Feeling better today. Walking around. Less fatigued. No f/c. Not much PO intake. No bowel or bladder complaints.     A 4 point ROS was performed (including CV, Resp, GI, ) and negative unless otherwise noted in HPI.     Physical Exam   /73 (BP Location: Right arm)  Pulse 55  Temp 98  F (36.7  C) (Oral)  Resp 16  Wt 88.5 kg (195 lb 1.6 oz)  SpO2 100%  BMI 27.21 kg/m2     GENERAL: Alert and oriented x 3. Ambulatory. Appears comfortable. Conversant.    HEENT: Anicteric sclera. Mucous membranes moist. Edentulous.   CV: RRR. S1, S2. No murmurs appreciated.   RESPIRATORY: Effort normal on room air. Lungs CTAB with no wheezing, rales, rhonchi.   GI: Abdomen soft and non distended, bowel sounds present. No tenderness, rebound, guarding.   NEUROLOGICAL: No focal deficits. Moves all extremities.    EXTREMITIES: No peripheral edema. Intact bilateral pedal pulses.   SKIN: No jaundice. No rashes.     Lines/Tubes/Drains  PIV  G tube     Data reviewed today: I reviewed all medications, new labs and imaging results over the last 24 hours.

## 2018-06-05 NOTE — PROGRESS NOTES
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  - penicillin allergy skin testing consult (this can be done tomorrow if benadryl is held - we can arrange this as an outpatient if discharge is planned or if he cannot tolerate not having benedryl).    DISCUSSION:   47 year old male with PMH of PMH of abdominal surgeries with short gut syndrome on chronic TPN via Azevedo catheter, hx recurrent bacteremia/line infections admitted on  with enterococcus faecalis azevedo infection. From an infectious standpoint, best way to cure infection is to remove his line but understand that he's already gone through several lines due to infection. Should he want to try and keep his line, would add vancomycin lock in addition to the IV vancomycin. Enterococcus line infections can sometimes be difficult to eradicate. The line has now been removed.  I would recommend continuing the Vancomycin for a total treatment course of 14 days.  Ideally we could get penicillin allergy testing, but it may not be possible during this hospitalization because Parker has been reliant on benadryl for his Vancomycin dosing and this will interfere with the interpretation of penicillin allergy testing.     Attestation:  This patient has been seen and evaluated by me, Gabbi BAUMAN  MD Jr. The plan was discussed with the patient and the primary team. I have reviewed today's vital signs, medications, labs and imaging.     Gabbi Thompson MD MPH  Infectious Diseases  Pager [phone]      Interval History:  Mr. Acevedo says that he feels good today.  He denies active fever or chills.  No nausea/vomiting.  He is going to have the port removed today.   He has no erythema over the port site. He has been tolerating the Vancomycin without rash.      HPI:   Mr Acevedo is a 47 year old male with PMH of abdominal surgeries with short gut syndrome on chronic TPN via Cm catheter, hx recurrent bacteremia/line infections, and PUD admitted on 6/1 after outpatient positive blood cultures. Around 4 days ago febrile to 102 with chills, nausea, non bloody vomiting, and muscles aches. These are consistent with prior symptoms of bacteremia. He's not sure but thinks he could have been having symptoms for a bit longer. In January, hospitalized for e coli port a cath infection and had enterococcus bacteremia at the same time. Port removed, given 14 days of vancomycin and ceftriaxone and a cm catheter was placed. Recently was told that he doesn't need to practice sterile techniques and so has not been washing hands, etc when accessing his line. He is feeling a little better after IV antibiotics, no fevers but continues to feel a little fatigued. He's unsure if he wants to treat through his line infection or wants to have his line removed.     Medications:  Current Facility-Administered Medications   Medication     acetaminophen (TYLENOL) tablet 1,000 mg     albuterol (PROAIR HFA/PROVENTIL HFA/VENTOLIN HFA) Inhaler 2 puff     amphetamine-dextroamphetamine (ADDERALL) per tablet 20 mg     carvedilol (COREG) tablet 12.5 mg     dextrose 5% and 0.45% NaCl infusion     vancomycin (VANCOCIN) 1,250 mg in sodium chloride 0.9 % 250 mL intermittent infusion    And     diphenhydrAMINE (BENADRYL)  solution 25 mg     fentaNYL (DURAGESIC) 12 mcg/hr 72 hr patch 1 patch     fentaNYL (DURAGESIC) 25 mcg/hr 72 hr patch 1 patch     fentaNYL (DURAGESIC) Patch in Place     fentaNYL (DURAGESIC) patch REMOVAL     heparin lock flush 10 UNIT/ML injection 5-10 mL     heparin lock flush 10 UNIT/ML injection 5-10 mL     lidocaine (LMX4) kit     lidocaine (LMX4) kit     lidocaine (LMX4) kit     lidocaine 1 % 1 mL     lidocaine 1 % 1 mL     lidocaine 1 % 1 mL     melatonin tablet 1 mg     naloxone (NARCAN) injection 0.1-0.4 mg     ondansetron (ZOFRAN-ODT) ODT tab 4 mg    Or     ondansetron (ZOFRAN) injection 4 mg     oxyCODONE (ROXICODONE) solution 15 mg     sodium chloride (PF) 0.9% PF flush 10-20 mL     sodium chloride (PF) 0.9% PF flush 3 mL     sodium chloride (PF) 0.9% PF flush 3 mL     sodium chloride (PF) 0.9% PF flush 3 mL     sucralfate (CARAFATE) suspension 1 g     vitamin D (ERGOCALCIFEROL) capsule 50,000 Units     ROS:  A ten point review of systems was obtained and was negative with the exception of that which is described in the HPI.    EXAMINATION:  /71 (BP Location: Right arm)  Pulse 55  Temp 97  F (36.1  C) (Oral)  Resp 18  Wt 88.5 kg (195 lb 1.6 oz)  SpO2 100%  BMI 27.21 kg/m2  GENERAL:  Well-developed, well-nourished, in bed in no acute distress.   EYES:  Eyes have anicteric sclerae without conjunctival injection.  ENT:  Oropharynx is moist without exudates or ulcers.  NECK:  Supple. No cervical lymphadenopathy.  LUNGS:  Normal respiratory effort. Lung fields are clear to auscultation bilateral.  CARDIOVASCULAR:  Regular rate and rhythm with no murmurs, gallops or rubs.  ABDOMEN:  Normal bowel sounds, soft, nontender. Healed abdominal scars. G tube in place.  SKIN:  No acute rashes. Right check azevedo c/d/i, no erythema or TTP  NEUROLOGIC:  Grossly nonfocal. Active x4 extremities.  PSYCH: Appropriate affect. Alert and oriented to person, place and time.    RELEVANT DATA:     BASIC LABS   The  following basic labs were personally reviewed:    Inflammatory Markers    Recent Labs   Lab Test  06/02/18   0609  06/01/18   1807  02/16/18   1515  02/12/18   1400  01/23/18   0845  01/20/18   1030  01/16/18   0717  01/15/18   0801   09/24/17   1900   06/28/17   2222  03/12/17   0351   11/01/16   1530   04/20/16   1530  04/11/16   1842   SED   --    --    --    --   10  11   --    --    --   31*   --   26*  17*   --   27*   --   34*  11   CRP  30.0*  26.0*  8.2*  <2.9  <2.9  5.4  26.0*  45.0*   < >  26.6*   < >  53.0*  3.4   < >  8.4*   < >  12.3*  80.0*    < > = values in this interval not displayed.       Hematology Studies    Recent Labs   Lab Test  06/04/18 0753 06/03/18   0741  06/02/18   0609  06/01/18   1807 05/23/18   0930  04/25/18   2335  04/23/18   1500  03/30/18   1215   WBC  7.6  5.7  5.1  4.8  5.4  6.3  6.3  6.6   ANEU  3.9   --    --   3.1  2.8  2.9  3.5  3.8   AEOS  0.3   --    --   0.1  0.3  0.3  0.2  0.2   HGB  13.2*  13.2*  12.7*  13.2*  12.7*  13.6  13.5  13.3   MCV  97  98  96  95  97  96  98  99   PLT  122*  111*  97*  124*  143*  130*  141*  159     Metabolic Studies     Recent Labs   Lab Test  06/04/18 0753 06/03/18   0741  06/02/18   0609  06/01/18 1807 05/23/18   0930   NA  143  142  144  145*  145*   POTASSIUM  4.0  3.7  3.3*  3.5  3.4   CHLORIDE  107  110*  109  109  109   CO2  27  21  29  27  30   BUN  7  7  5*  5*  9   CR  0.70  0.60*  0.64*  0.59*  0.67   GFRESTIMATED  >90  >90  >90  >90  >90       Hepatic Studies    Recent Labs   Lab Test  06/01/18   1807  05/23/18   0930  04/23/18   1500  03/30/18   1215  02/26/18   1430  02/12/18   1400   BILITOTAL  0.4  0.5  0.4  0.3  0.5  0.5   ALKPHOS  86  81  81  79  69  76   ALBUMIN  3.3*  3.3*  3.7  3.4  3.4  3.9   AST  8  12  15  19  11  13   ALT  18  26  27  25  20  33       Thyroid Studies    Recent Labs   Lab Test  11/03/14   1355  05/20/14   1252   TSH  0.96  1.39       MICROBIOLOGY LABS  The following microbiology studies were  personally reviewed:  Culture Micro   Date Value Ref Range Status   06/04/2018 No growth after 11 hours  Preliminary   06/04/2018 No growth after 11 hours  Preliminary   06/04/2018 Culture negative monitoring continues  Preliminary   06/02/2018 Canceled, Test credited  Final   06/02/2018 Canceled via EPIC interface  Final   06/02/2018 Canceled, Test credited  Final   06/02/2018 Canceled via EPIC interface  Final   06/01/2018 No growth after 4 days  Preliminary   06/01/2018 (A)  Final    Cultured on the 1st day of incubation:  Enterococcus faecalis     06/01/2018   Final    Critical Value/Significant Value, preliminary result only, called to and read back by  ROGER MARTIN RN U5A 0455 06.02.18      06/01/2018 Susceptibility testing done on previous specimen  Final   05/31/2018 (A)  Preliminary    Cultured on the 1st day of incubation:  Enterococcus faecalis  Susceptibility testing done on previous specimen     05/31/2018   Preliminary    Critical Value/Significant Value, preliminary result only, called to and read back by  Savannah Solitario RN from [facility]. 6.1.18 at 0525. GR.     05/31/2018 (A)  Preliminary    Cultured on the 1st day of incubation:  Staphylococcus epidermidis  Susceptibility testing in progress     05/31/2018   Preliminary    Critical Value/Significant Value called to and read back by  Maday Tan RN 5A at 1215 on 6/4/18 by ALMA.     05/31/2018 (A)  Preliminary    Cultured on the 1st day of incubation:  Enterococcus faecalis     05/31/2018   Preliminary    Critical Value/Significant Value, preliminary result only, called to and read back by  Savannah Jarrett, on call RN for [facility]. 6.1.18 at 0237. GR.      05/31/2018 (A)  Preliminary    Cultured on the 1st day of incubation:  Staphylococcus haemolyticus  Susceptibility testing in progress     05/31/2018   Preliminary    Critical Value/Significant Value called to and read back by  Maday Tan RN at 1515 on 6/4/18 by ALMA.     05/31/2018    Preliminary    (Note)  POSITIVE for ENTEROCOCCUS FAECALIS and NEGATIVE for Melvin/vanB genes  by Verigene multiplex nucleic acid test. Final identification and  antimicrobial susceptibility testing will be verified by standard  methods.    Specimen tested with Verigene multiplex, gram-positive blood culture  nucleic acid test for the following targets: Staph aureus, Staph  epidermidis, Staph lugdunensis, other Staph species, Enterococcus  faecalis, Enterococcus faecium, Streptococcus species, S. agalactiae,  S. anginosus grp., S. pneumoniae, S. pyogenes, Listeria sp., mecA  (methicillin resistance) and Melvin/B (vancomycin resistance).    Critical Value/Significant Value called to and read back by Savannah Jarrett RN from [facility]. 6.1.18 at 0521. GR.     01/15/2018 <15 colonies   Escherichia coli   (A)  Final   01/14/2018 No growth  Final   01/14/2018 No growth  Final   01/13/2018 (A)  Final    Cultured on the 1st day of incubation:  Enterococcus faecalis  Susceptibility testing done on previous specimen     01/13/2018 (A)  Final    Upon further review of the original slide, no gram negative rods are seen  Previously reported as:  Cultured on the 1st day of incubation:  Gram negative rods  CORRECTED ON:  1.20.18 @1407 AV     01/13/2018   Final    Critical Value/Significant Value, preliminary result only, called to and read back by  Taylor Bhatt RN from [facility]. 1.14.18 at 0111. GR.     01/13/2018   Final    Corrected report called to and read back by  DENNIS Dooley 1.20.18 @1410 AV     01/13/2018 (A)  Final    Cultured on the 1st day of incubation:  Enterococcus faecalis  Susceptibility testing done on previous specimen     01/13/2018   Final    Critical Value/Significant Value, preliminary result only, called to and read back by  Shorty Montgomery RN at 1953 on 01.13.18 by stephanie     01/13/2018 (A)  Final    Cultured on the 1st day of incubation:  Staphylococcus hominis     01/13/2018   Final    Critical Value/Significant Value  called to and read back by  Kasia Jones RN on 1.16.2018 at 1114. KVO     01/12/2018 (A)  Final    Cultured on the 1st day of incubation:  Escherichia coli  Susceptibility testing done on previous specimen     01/12/2018 (A)  Final    Cultured on the 1st day of incubation:  Enterococcus faecalis     01/12/2018   Final    Critical Value/Significant Value, preliminary result only, called to and read back by  Shorty Gardner RN 7B @ 0836.cg 01/13/18 01/12/2018   Final    (Note)  POSITIVE for ENTEROCOCCUS FAECALIS and NEGATIVE for Melvin/vanB genes  by Verigene multiplex nucleic acid test. Final identification and  antimicrobial susceptibility testing will be verified by standard  methods.    Specimen tested with Verigene multiplex, gram-positive blood culture  nucleic acid test for the following targets: Staph aureus, Staph  epidermidis, Staph lugdunensis, other Staph species, Enterococcus  faecalis, Enterococcus faecium, Streptococcus species, S. agalactiae,  S. anginosus grp., S. pneumoniae, S. pyogenes, Listeria sp., mecA  (methicillin resistance) and Melvin/B (vancomycin resistance).    Critical Value/Significant Value called to and read back by Shorty Gardner RN 7B @ 1129.cg 01/13/18 01/12/2018 No growth  Final   01/12/2018 No growth  Final   01/12/2018 (A)  Final    Cultured on the 1st day of incubation:  Escherichia coli     01/12/2018   Final    Critical Value/Significant Value, preliminary result only, called to and read back by  Dr Jeet Orozco  in the [facility] at 8:20am 1/12/2018 ()     01/12/2018   Final    (Note)  POSITIVE for E.COLI by Verigene multiplex nucleic acid test. Final  identification and antimicrobial susceptibility testing will be  verified by standard methods. Verigene test will not distinguish  E.coli from Shigella species including S.dysenteriae, S.flexneri,  S.boydii, and S.sonnei. Specimens containing Shigella species or  E.coli will be reported as Positive for E.coli.    Specimen  tested with [employer] multiplex, gram-negative blood culture  nucleic acid test for the following targets: Acinetobacter sp.,  Citrobacter sp., Enterobacter sp., Proteus sp., E. coli, K.  pneumoniae/oxytoca, P. aeruginosa, and the following resistance  markers: CTXM, KPC, NDM, VIM, IMP and OXA.    Critical Value/Significant Value called to and read back by Dr. Jeet Orozco at 1042 on 1.12.18.KD     09/24/2017 No growth  Final   09/24/2017 No growth  Final   09/23/2017 No growth  Final   09/23/2017 No growth  Final   09/22/2017 No growth  Final   09/22/2017 No growth  Final   09/22/2017 No growth  Final   09/21/2017 No growth  Final   09/21/2017 (A)  Final    Cultured on the 1st day of incubation:  Streptococcus mitis group  Identification obtained by MALDI-TOF mass spectrometry research use only database. Test   characteristics determined and verified by the [facility]   ([facility]) [place], [place].     09/21/2017   Final    Critical Value/Significant Value, preliminary result only, called to and read back by  Nathaly Yo RN on 9/22/2017 @2011, tk     09/21/2017 (A)  Final    Cultured on the 1st day of incubation:  Streptococcus salivarius  Susceptibility testing done on previous specimen     09/21/2017   Final    Critical Value/Significant Value, preliminary result only, called to and read back by  Karine Meyers RN U5B 0400 09.22.17 CF     09/21/2017 (A)  Final    Cultured on the 1st day of incubation:  Rothia mucilaginosa  Susceptibility testing done on previous specimen     09/21/2017 (A)  Final    Cultured on the 1st day of incubation:  Staphylococcus epidermidis     09/21/2017 No growth  Final   09/21/2017 No growth  Final   09/20/2017 No growth  Final   09/20/2017 No growth  Final   09/19/2017 No growth  Final   09/19/2017 (A)  Preliminary    Cultured on the 2nd day of incubation:  Streptococcus salivarius  Susceptibility testing done on previous specimen     09/19/2017   Preliminary     Critical Value/Significant Value, preliminary result only, called to and read back by  Annel Hirsch RN at 0814 on 9.20.17.KD      09/19/2017 (A)  Preliminary    Cultured on the 2nd day of incubation:  Gram positive cocci in clusters  Susceptibility testing done on previous specimen     09/19/2017 Referred to research section for identification  Preliminary   09/19/2017 (A)  Final    Cultured on the 1st day of incubation:  Streptococcus salivarius     09/19/2017   Final    Critical Value/Significant Value, preliminary result only, called to and read back by  Dr. Jimenez, from [facility]. 09.19.17 at 1357. GR.     09/19/2017 (A)  Final    Cultured on the 1st day of incubation:  Streptococcus salivarius  Susceptibility testing done on previous specimen     09/19/2017   Final    Critical Value/Significant Value, preliminary result only, called to and read back by  Dr. Jimenez from [facility]. 09.19.17 at 1041. GR.     09/19/2017 (A)  Final    Cultured on the 1st day of incubation:  Streptococcus mitis group     09/19/2017 (A)  Final    Cultured on the 1st day of incubation:  Rothia mucilaginosa     09/19/2017   Final    Critical Value/Significant Value called to and read back by  KACIE Rose RN on 9.23.17 at 1101. bw     09/19/2017   Final    (Note)  NEGATIVE for the following: Staphylococcus spp., Staph aureus, Staph  epidermidis, Staph lugdunensis, Streptococcus spp., Strep pneumoniae,  Strep pyogenes, Strep agalactiae, Strep anginosus group, Enterococcus  faecalis, Enterococcus faecium, and Listeria spp. by [employer]  multiplex nucleic acid test. Final identification and antimicrobial  susceptibility testing will be verified by standard methods.    Critical Value/Significant Value called to and read back by Dr. Jimenez from [facility]. 09.19.17 at 1357. GR.     08/01/2017 No growth  Final   06/28/2017 No growth  Final   06/28/2017 No growth  Final   06/26/2017 No growth  Final   06/26/2017 (A)  Final    Cultured on the 1st day of  incubation: Streptococcus salivarius group  Cultured on the 1st day of incubation: Staphylococcus epidermidis  Critical Value/Significant Value, preliminary result only, called to and read   back by  Dr. Francis Vyas @1652 6/27/17. ct  Cultured on the 1st day of incubation: Rothia mucilaginosa  (Note)  POSITIVE for STAPHYLOCOCCUS EPIDERMIDIS and POSITIVE for the mecA  gene (resistant to methicillin) by [employer] multiplex nucleic acid  test. Final identification and antimicrobial susceptibility testing  will be verified by standard methods.    Specimen tested with Verigene multiplex, gram-positive blood culture  nucleic acid test for the following targets: Staph aureus, Staph  epidermidis, Staph lugdunensis, other Staph species, Enterococcus  faecalis, Enterococcus faecium, Streptococcus species, S. agalactiae,  S. anginosus grp., S. pneumoniae, S. pyogenes, Listeria sp., mecA  (methicillin resistance) and Melvin/B (vancomycin resistance).    Critical Value/Significant Value called to and read back by Dr. Vyas @ 1942 6/27/17 CS       04/11/2017 No growth  Final   04/09/2017 No growth  Final   04/09/2017 No growth  Final   03/12/2017 No growth  Final   03/12/2017 No growth  Final   03/08/2017 No growth  Final   03/07/2017 No growth  Final   03/07/2017 No growth  Final   10/30/2016 No growth  Final   10/29/2016 No growth  Final   10/28/2016 No growth  Final   10/27/2016 15 to 50 colonies Staphylococcus epidermidis (A)  Final   10/27/2016 (A)  Final    Cultured on the 1st day of incubation: Staphylococcus epidermidis Susceptibility   testing done on previous specimen  Critical Value/Significant Value, preliminary result only, called to and read   back by Shahrzad Long RN at 0028 10.28.16.DK     10/27/2016 No growth  Final   10/26/2016 (A)  Final    Cultured on the 1st day of incubation: Staphylococcus epidermidis Susceptibility   testing done on previous specimen  Critical Value/Significant Value called to and  read back by Soraida Stafford RN U5A   10/27/16 0136 CF     10/26/2016 (A)  Final    Cultured on the 1st day of incubation: Staphylococcus epidermidis Susceptibility   testing done on previous specimen  Critical Value/Significant Value, preliminary result only, called to and read   back by Karis Nicole RN at 1553 on 10.26.16.KD     10/25/2016 (A)  Final    Cultured on the 1st day of incubation: Staphylococcus epidermidis Susceptibility   testing done on previous specimen  Critical Value/Significant Value, preliminary result only, called to and read   back by Tanya Acosta RN 0258 10/26/16. MS     10/25/2016 (A)  Final    Cultured on the 1st day of incubation: Staphylococcus epidermidis Susceptibility   testing done on previous specimen  Critical Value/Significant Value, preliminary result only, called to and read   back by  Goldie Serna RN on 5A, 10/26/16 @ 1359 lm     10/24/2016 (A)  Final    Cultured on the 1st day of incubation: Staphylococcus epidermidis Susceptibility   testing done on previous specimen  Critical Value/Significant Value, preliminary result only, called to and read   back by  SREE HERNANDES CH RN (5A). 10.25.16 2204 GJS     10/24/2016 (A)  Final    Cultured on the 1st day of incubation: Staphylococcus epidermidis  Critical Value/Significant Value called to and read back by Jyothi Goins RN   [facility] 10/25/16 at 0550 CF  (Note)  POSITIVE for STAPHYLOCOCCUS EPIDERMIDIS and NEGATIVE for the mecA  gene (not resistant to methicillin) by Verigene nucleic acid test.  The mecA gene was not detected. Final identification and  antimicrobial susceptibility testing will be verified by standard  methods.    Specimen tested with Verigene multiplex, gram-positive blood culture  nucleic acid test for the following targets: Staph aureus, Staph  epidermidis, Staph lugdunensis, other Staph species, Enterococcus  faecalis, Enterococcus faecium, Streptococcus species, S. agalactiae,  S. anginosus grp., S. pneumoniae, S.  pyogenes, Listeria sp., mecA  (methicillin resistance) and Melvin/B (vancomycin resistance).    Critical Value/Significant Value called to and read back by Shruthi Salas RN 10.25.16 at 0823am NK       08/10/2016 (A)  Final    Light growth Coagulase negative Staphylococcus  Susceptibility testing not routinely done     08/05/2016 No growth  Final   08/05/2016 No growth  Final   04/15/2016 No growth  Final       Urine Studies    Recent Labs   Lab Test  06/01/18   2021  01/12/18   0042  09/19/17   0242  08/01/17   1133  06/28/17   2339   LEUKEST  Negative  Negative  Negative  Negative  Negative   WBCU  <1  <1  2  O - 2  0       Vancomycin Levels    Recent Labs   Lab Test  06/03/18   1135  01/26/18   0900  01/25/18   0905   VANCOMYCIN  11.0  9.1  9.7     Hepatitis B Testing   Recent Labs   Lab Test  09/20/17   0549   HEPBANG  Nonreactive     Hepatitis C Testing     Hepatitis C Antibody   Date Value Ref Range Status   09/20/2017 Nonreactive NR^Nonreactive Final     Comment:     Assay performance characteristics have not been established for newborns,   infants, and children       IMAGING RESULTS  6/1 CXR: No acute cardiopulmonary abnormality. If the patient is immunocompromised and there is concern for infection, airspace infection can be quite subtle in immunocompromised patients and CT would be appropriate.

## 2018-06-06 ENCOUNTER — HOME INFUSION (PRE-WILLOW HOME INFUSION) (OUTPATIENT)
Dept: PHARMACY | Facility: CLINIC | Age: 48
End: 2018-06-06

## 2018-06-06 ENCOUNTER — CARE COORDINATION (OUTPATIENT)
Dept: CARE COORDINATION | Facility: CLINIC | Age: 48
End: 2018-06-06

## 2018-06-06 ENCOUNTER — PATIENT OUTREACH (OUTPATIENT)
Dept: CARE COORDINATION | Facility: CLINIC | Age: 48
End: 2018-06-06

## 2018-06-06 ENCOUNTER — APPOINTMENT (OUTPATIENT)
Dept: GENERAL RADIOLOGY | Facility: CLINIC | Age: 48
DRG: 314 | End: 2018-06-06
Attending: PHYSICIAN ASSISTANT
Payer: COMMERCIAL

## 2018-06-06 VITALS
DIASTOLIC BLOOD PRESSURE: 83 MMHG | WEIGHT: 197.4 LBS | RESPIRATION RATE: 18 BRPM | OXYGEN SATURATION: 98 % | BODY MASS INDEX: 27.53 KG/M2 | TEMPERATURE: 100.6 F | SYSTOLIC BLOOD PRESSURE: 124 MMHG | HEART RATE: 88 BPM

## 2018-06-06 LAB
BACTERIA SPEC CULT: NO GROWTH
SPECIMEN SOURCE: NORMAL

## 2018-06-06 PROCEDURE — 25000128 H RX IP 250 OP 636: Performed by: INTERNAL MEDICINE

## 2018-06-06 PROCEDURE — 25000125 ZZHC RX 250: Performed by: PHYSICIAN ASSISTANT

## 2018-06-06 PROCEDURE — 25000132 ZZH RX MED GY IP 250 OP 250 PS 637: Performed by: PHYSICIAN ASSISTANT

## 2018-06-06 PROCEDURE — 99239 HOSP IP/OBS DSCHRG MGMT >30: CPT | Performed by: INTERNAL MEDICINE

## 2018-06-06 PROCEDURE — 99207 ZZC APP CREDIT; MD BILLING SHARED VISIT: CPT | Performed by: PHYSICIAN ASSISTANT

## 2018-06-06 PROCEDURE — 25000132 ZZH RX MED GY IP 250 OP 250 PS 637: Performed by: INTERNAL MEDICINE

## 2018-06-06 PROCEDURE — C1751 CATH, INF, PER/CENT/MIDLINE: HCPCS

## 2018-06-06 PROCEDURE — 25000132 ZZH RX MED GY IP 250 OP 250 PS 637: Performed by: NURSE PRACTITIONER

## 2018-06-06 PROCEDURE — 40000986 XR CHEST 1 VW

## 2018-06-06 PROCEDURE — 36569 INSJ PICC 5 YR+ W/O IMAGING: CPT

## 2018-06-06 PROCEDURE — C1769 GUIDE WIRE: HCPCS

## 2018-06-06 RX ORDER — LIDOCAINE 40 MG/G
CREAM TOPICAL
Status: DISCONTINUED | OUTPATIENT
Start: 2018-06-06 | End: 2018-06-06 | Stop reason: HOSPADM

## 2018-06-06 RX ORDER — DIPHENHYDRAMINE HCL 12.5 MG/5ML
25 SOLUTION ORAL 3 TIMES DAILY
Qty: 310 ML | Refills: 0 | Status: SHIPPED | OUTPATIENT
Start: 2018-06-06 | End: 2019-02-16

## 2018-06-06 RX ORDER — CEFAZOLIN SODIUM 1 G/50ML
1250 SOLUTION INTRAVENOUS EVERY 8 HOURS
DISCHARGE
Start: 2018-06-06 | End: 2018-06-16

## 2018-06-06 RX ORDER — DIPHENHYDRAMINE HYDROCHLORIDE 12.5 MG/1
25 BAR, CHEWABLE ORAL 3 TIMES DAILY
Qty: 112 TABLET | COMMUNITY
Start: 2018-06-06 | End: 2018-06-06

## 2018-06-06 RX ORDER — DIPHENHYDRAMINE HCL 12.5MG/5ML
25 LIQUID (ML) ORAL EVERY 8 HOURS
Qty: 300 ML | Refills: 0 | DISCHARGE
Start: 2018-06-06 | End: 2019-02-16

## 2018-06-06 RX ADMIN — DEXTROAMPHETAMINE SACCHARATE, AMPHETAMINE ASPARTATE, DEXTROAMPHETAMINE SULFATE AND AMPHETAMINE SULFATE 20 MG: 2.5; 2.5; 2.5; 2.5 TABLET ORAL at 08:15

## 2018-06-06 RX ADMIN — ONDANSETRON 4 MG: 4 TABLET, ORALLY DISINTEGRATING ORAL at 05:44

## 2018-06-06 RX ADMIN — ONDANSETRON 4 MG: 4 TABLET, ORALLY DISINTEGRATING ORAL at 14:27

## 2018-06-06 RX ADMIN — DIPHENHYDRAMINE HYDROCHLORIDE 25 MG: 25 SOLUTION ORAL at 10:49

## 2018-06-06 RX ADMIN — SUCRALFATE 1 G: 1 SUSPENSION ORAL at 11:38

## 2018-06-06 RX ADMIN — OXYCODONE HYDROCHLORIDE 15 MG: 5 SOLUTION ORAL at 10:37

## 2018-06-06 RX ADMIN — SUCRALFATE 1 G: 1 SUSPENSION ORAL at 05:49

## 2018-06-06 RX ADMIN — CARVEDILOL 12.5 MG: 12.5 TABLET, FILM COATED ORAL at 08:15

## 2018-06-06 RX ADMIN — VANCOMYCIN HYDROCHLORIDE 1250 MG: 10 INJECTION, POWDER, LYOPHILIZED, FOR SOLUTION INTRAVENOUS at 11:20

## 2018-06-06 RX ADMIN — LIDOCAINE HYDROCHLORIDE 3 ML: 10 INJECTION, SOLUTION INFILTRATION; PERINEURAL at 09:41

## 2018-06-06 RX ADMIN — ACETAMINOPHEN 650 MG: 325 SOLUTION ORAL at 14:27

## 2018-06-06 RX ADMIN — OXYCODONE HYDROCHLORIDE 15 MG: 5 SOLUTION ORAL at 14:28

## 2018-06-06 RX ADMIN — OXYCODONE HYDROCHLORIDE 15 MG: 5 SOLUTION ORAL at 05:52

## 2018-06-06 ASSESSMENT — PAIN DESCRIPTION - DESCRIPTORS
DESCRIPTORS: ACHING

## 2018-06-06 NOTE — PROGRESS NOTES
Clinic Care Coordination Contact    Situation: Patient chart reviewed by care coordinator.    Background: RN CC reviewing chart following writer's SUZI.    Assessment: Patient is currently inpatient at [facility].    Plan/Recommendations: RN CC will monitor for patient discharge and then outreach to patient.    Melissa Behl BSN, RN, N  [facility] Care Coordinator  [phone]

## 2018-06-06 NOTE — PHARMACY-VANCOMYCIN DOSING SERVICE
Pharmacy Vancomycin Note  Date of Service 2018  Patient's  1970   47 year old, male    Indication: Bacteremia  Goal Trough Level: 15-20 mg/L  Day of Therapy: 5  Current Vancomycin regimen: 1250 mg IV q8h    Current estimated CrCl = Estimated Creatinine Clearance: 188.3 mL/min (based on Cr of 0.62).    Creatinine for last 3 days  6/3/2018:  7:41 AM Creatinine 0.60 mg/dL  2018:  7:53 AM Creatinine 0.70 mg/dL  2018:  4:58 PM Creatinine 0.62 mg/dL    Recent Vancomycin Levels (past 3 days)  6/3/2018: 11:35 AM Vancomycin Level 11.0 mg/L  2018:  4:58 PM Vancomycin Level 16.2 mg/L    Vancomycin IV Administrations (past 72 hours)                   vancomycin (VANCOCIN) 1,250 mg in sodium chloride 0.9 % 250 mL intermittent infusion (mg) 1,250 mg New Bag 18 0953     1,250 mg New Bag  0314     1,250 mg Started 18 1806     1,250 mg New Bag  0601     1,250 mg New Bag 18 2153                Nephrotoxins and other renal medications (Future)    Start     Dose/Rate Route Frequency Ordered Stop    18  vancomycin (VANCOCIN) 1,250 mg in sodium chloride 0.9 % 250 mL intermittent infusion      1,250 mg  over 90 Minutes Intravenous EVERY 8 HOURS 18 1359               Contrast Orders - past 72 hours     None          Interpretation of levels and current regimen:  Trough level is  Therapeutic    Has serum creatinine changed > 50% in last 72 hours: No    Urine output:  unable to determine    Renal Function: Stable    Plan:  1.  Continue Current Dose  2.  Pharmacy will check trough levels as appropriate in 5-7 Days.    3. Serum creatinine levels will be ordered daily for the first week of therapy and at least twice weekly for subsequent weeks.      Eryn Kirby        .

## 2018-06-06 NOTE — PROGRESS NOTES
PIV infiltrated after Vanco completed infusing and was removed by pt. Pt refused new PIV placement, stating PICC will be placed in AM. Informed pt of Vanco schedule q8 hrs with overnight dose, pt continues to disagree and refuse for PIV to be placed.

## 2018-06-06 NOTE — DISCHARGE SUMMARY
[facility], [place]    Internal Medicine Discharge Summary- Gold Service    Date of Admission: 6/1/2018  Date of Discharge: 6/6/2018  Discharging Provider: Leana Rosales PA-C/Lorraine Concepcion MD  Discharging Team: Gold 2    Follow-ups Needed After Discharge   PCP next 7 days for hospital f/u  IV vancomycin through 6/16  ID clinic 6/21    Discharge Diagnoses   Recurrent Enterococcus Faecalis Line-Associated Bacteremia  Severe Malnutrition, in context on chronic illness, TPN dependent  Short Gut Syndrome  Chronic Abdominal Pain    Hospital Course   Parker Acevedo Sr is a 47 year old man with a history of multiple abdominal surgeries (RnYGB, gastrojejunostomy, gastrectomy, cholecystectomy, appendectomy), chronic abdominal pain, PUD, and short gut syndrome w/ severe malnutrition on TPN via Cm catheter w/ recurrent line infections admitted 6/1 w/ bacteremia.     #Recurrent Enterococcus Faecalis Line-Associated Bacteremia. Blood cx 5/31 and 6/2 + E faecalis sensitive to vancomycin and ampicillin. 6/3 negative. Afebrile, no leukocytosis. TTE 6/3 negative for valvular vegetation. Cm pulled 6/4 with tip culture negative. PICC placed 6/6. ID followed throughout. Patient treated with vancomycin and will complete 2 week course from start date through 6/16. Takes Benadryl prior due to history of red man syndrome. Unable to use ampicillin due to reported PCN allergy and need for skin testing prior (ID may arrange as OP - could not do here due to need for Benadryl). Care coordinator asked home care to ensure sterile dressing changes but patient has had some poor compliance in the past. Will be seen in ID clinic on 6/21 and Dr. Jonah Thompson will eventually arrange for OP Cm replacement.     #Severe Malnutrition. Patient has a history of short gut syndrome from complications of bariatric surgery on chronic TPN. RnYGB in 2011 c/b marginal ulcer, G tube placement in 2011, ex lap, multiple  SUZI, partial gastrectomy, esophagojejunostomy in 2015. Uses G tube for venting which was replaced here by bariatric surgery on 6/3 (routine schedule). He can only tolerate a few hundred calories of food per day before getting bloated; did not tolerate PEG feedings in the past. Describes some episodes of regurgitation in the early morning hours, advised that he try venting his G tube overnight. No evidence of aspiration PNA at this time w/ clear CXR, clear lung exam, and normal labs and O2 sats. Home TPN was on holiday here. Dietitian connected w/ [facility] to adjust for home - [facility] to resume previous home TPN but run x 24 hours the first day until labs are ok. Then cycle x 14 hours per previous home regimen. He has been off TPN x 5 days here some some (thought minimal) risk for refeeding syndrome. Has comfortably managed all of this at home for many years and would like to d/c today.      #NICM. LVEF improved from 45-50% to 60-65% on TTE here. Will continue home Coreg.     The following problems were addressed during his hospitalization:        #Discharge Pain Plan:   - During his hospitalization, Parker experienced pain due to chronic pain.  The pain plan for discharge was discussed with Parker and the plan was created in a collaborative fashion.    - Chronic/continued opioids: Fentanyl, oxycodone - no prescriptions given, followed by pain management     Consultations This Hospital Stay   ID, Bariatric Surgery for G tube exchange    Physical Exam   Blood pressure 111/69, pulse 73, temperature 97  F (36.1  C), temperature source Oral, resp. rate 18, weight 89.5 kg (197 lb 6.4 oz), SpO2 99 %.  GENERAL: Alert and oriented x 3. Ambulatory. Appears comfortable. Conversant.    HEENT: Anicteric sclera. Mucous membranes moist. Edentulous.   CV: RRR. S1, S2. No murmurs appreciated.   RESPIRATORY: Effort normal on room air. Lungs CTAB with no wheezing, rales, rhonchi.   GI: Abdomen soft and non distended, bowel sounds present. No  tenderness, rebound, guarding.   NEUROLOGICAL: No focal deficits. Moves all extremities.    EXTREMITIES: No peripheral edema. Intact bilateral pedal pulses.   SKIN: No jaundice. No rashes.     Significant Results and Procedures   Cm removal 6/4/18  PICC placement 6/6/18    Pending Results   These results will be followed up by ID  Unresulted Labs Ordered in the Past 30 Days of this Admission     Date and Time Order Name Status Description    6/4/2018 1128 Blood culture Preliminary     6/4/2018 1128 Blood culture Preliminary     6/1/2018 1756 Blood culture Preliminary     5/31/2018 1415 BLOOD CULTURE Preliminary     5/31/2018 1400 BLOOD CULTURE Preliminary           Primary Care Physician   Esteban Daly    Discharge Disposition   Discharged to home w/ wife and C RN  Condition at discharge: Stable    Code Status   Prior    Discharge Procedure Orders  Medication Therapy Management Referral   Referral Type: Med Therapy Management     Home infusion referral     Reason for your hospital stay   Order Comments: Admitted with bacteremia due to line infection     Follow Up and recommended labs and tests   Order Comments: Twice weekly creatinine. Next vancomycin trough Friday 6/8 before 2 pm dose. See PCP next 7 days for hospital follow up. ID clinic follow up 6/21 to reassess timeframe for Cm replacement. Also have visit with Dr Vyas that day and Dr. Milligan on 6/27.     Activity   Order Comments: Your activity upon discharge: activity as tolerated   Order Specific Question Answer Comments   Is discharge order? Yes      When to contact your care team   Order Comments: Notify PCP/ID if pain/redness around PICC site or new fevers.     Tubes and drains   Order Comments: You are going home with the following tubes or drains: feeding tube G-Tube. Tube cares per hospital or home care instructions. Consider continuous venting overnight.     IV access   Order Comments: You are going home with the following  vascular access device: PICC.     Full Code     Diet   Order Comments: Follow this diet upon discharge: full liquid diet   Order Specific Question Answer Comments   Is discharge order? Yes         Discharge Medications   Current Discharge Medication List      START taking these medications    Details   diphenhydrAMINE (BENADRYL) 12.5 MG/5ML liquid Take 10 mLs (25 mg) by mouth 3 times daily for 31 doses Prior to vancomycin infusions  Qty: 310 mL, Refills: 0    Associated Diagnoses: Bacteremia      diphenhydrAMINE (BENADRYL) 12.5 MG/5ML solution Take 10 mLs (25 mg) by mouth every 8 hours for 10 days  Qty: 300 mL, Refills: 0    Associated Diagnoses: Bacteremia      vancomycin 1,250 mg Inject 1,250 mg into the vein every 8 hours for 10 days Through 6/16/18. Consider retiming doses to 0600, 1400, 2200 to avoid middle of the night dosing (current schedule 0200, 1000, 1800). Can achieve this by shifting doses up to 0800, 1600, 2400 for one day (6/7/18) then to goal schedule 0600, 1400, 2200 the following day (6/8/18).    Associated Diagnoses: Bacteremia         CONTINUE these medications which have CHANGED    Details   !! parenteral nutrition - PTA/DISCHARGE ORDER [facility] to resume previous home TPN but run x 24 hours the first day until labs are ok. Then cycle x 14 hours per previous home regimen. He has been off TPN x 5 days here.       !! - Potential duplicate medications found. Please discuss with provider.      CONTINUE these medications which have NOT CHANGED    Details   acetaminophen (TYLENOL) 500 MG tablet Take 1,000 mg by mouth every 6 hours as needed for fever      albuterol (PROAIR HFA/PROVENTIL HFA/VENTOLIN HFA) 108 (90 Base) MCG/ACT Inhaler Inhale 2 puffs into the lungs every 4 hours as needed for shortness of breath / dyspnea or wheezing  Qty: 1 Inhaler, Refills: 3    Associated Diagnoses: Productive cough      carvedilol (COREG) 12.5 MG tablet Take 12.5 mg by mouth 2 times daily (with meals)     "  cyanocobalamin (VITAMIN B12) 1000 MCG/ML injection Inject 1 mL (1,000 mcg) into the muscle every 30 days  Qty: 1 mL, Refills: 11    Associated Diagnoses: Bariatric surgery status      ondansetron (ZOFRAN-ODT) 8 MG ODT tab Take 1 tablet (8 mg) by mouth every 8 hours as needed for nausea  Qty: 90 tablet, Refills: 3    Associated Diagnoses: Nausea      oxyCODONE (ROXICODONE) 5 MG/5ML solution Take 10-15 mLs (10-15 mg) by mouth every 4 hours as needed for moderate to severe pain Max of 55 mg/day this month. Wean as able Fill on/after 6/8/18 not to start untill 6/11/18. Early fill for travel.  Qty: 1650 mL, Refills: 0    Associated Diagnoses: Encounter for long-term opiate analgesic use; Chronic abdominal pain      sucralfate (CARAFATE) 1 GM/10ML suspension Take 10 mLs (1 g) by mouth 4 times daily  Qty: 1200 mL, Refills: 11    Associated Diagnoses: Nausea      amphetamine-dextroamphetamine (ADDERALL) 20 MG per tablet Take 1 tablet (20 mg) by mouth daily  Qty: 30 tablet, Refills: 0    Associated Diagnoses: ADHD (attention deficit hyperactivity disorder), inattentive type      !! [facility]NAGED PATIENT Contact [facility] for patient specific medication information at [phone] on admission and discharge from the hospital.  Phones are answered 24 hours a day 7 days a week 365 days a year.    Providers - Choose \"CONTINUE HOME MED (no script)\" at discharge if patient treatment with home infusion will continue.    Comments: Resume prior to admission TPN/Lipids/saline  Associated Diagnoses: S/P bariatric surgery      fentaNYL (DURAGESIC) 12 mcg/hr 72 hr patch Place 1 patch onto the skin every 48 hours . Remove old patch.  Fill on/after 6/22 to start on/after 6/24  Qty: 15 patch, Refills: 0    Associated Diagnoses: Chronic abdominal pain; Encounter for long-term opiate analgesic use      fentaNYL (DURAGESIC) 25 mcg/hr 72 hr patch Place 1 patch onto the skin every 48 hours remove old patch.  " "Release on/after 6/22/18 to start on/after 6/24/18  Qty: 15 patch, Refills: 0    Associated Diagnoses: Chronic abdominal pain; Encounter for long-term opiate analgesic use      lidocaine (LIDODERM) 5 % Patch Place 1-2 patches onto the skin every 24 hours Wear for 12 hours, remove for 12 hours.  OK to cut to better fit to size.  Qty: 60 patch, Refills: 6    Associated Diagnoses: Chronic bilateral low back pain without sciatica; Chronic abdominal pain; Myofascial pain      naloxone (NARCAN) nasal spray Spray 1 spray (4 mg) into one nostril alternating nostrils as needed for opioid reversal (every 2-3 minutes until assistance arrives.)  Qty: 0.2 mL, Refills: 0    Associated Diagnoses: Encounter for long-term opiate analgesic use; Chronic abdominal pain      Needle, Disp, (BD DISP NEEDLES) 27G X 1/2\" MISC 1 Device every 30 days Use for cyanocobalamin injection once q 30 days.  Qty: 3 each, Refills: 4    Associated Diagnoses: Bariatric surgery status      !! order for DME Equipment being ordered: Bilateral knee high chronic venous insufficiency stockings--  mild-moderate pressures.  Qty: 4 each, Refills: 5    Associated Diagnoses: Bilateral edema of lower extremity      !! order for DME Injection Supplies for Vitamin B12: 3cc syringes w/ 27 gauge needles, 1/2 inch length  Qty: 12 each, Refills: 0    Associated Diagnoses: Bariatric surgery status      vitamin D (ERGOCALCIFEROL) 87504 UNIT capsule Take 1 capsule (50,000 Units) by mouth every 7 days  Qty: 12 capsule, Refills: 3    Associated Diagnoses: Vitamin D deficiency       !! - Potential duplicate medications found. Please discuss with provider.      STOP taking these medications       DiphenhydrAMINE HCl 12.5 MG CHEW Comments:   Reason for Stopping:         mupirocin (BACTROBAN) 2 % ointment Comments:   Reason for Stopping:         nystatin-triamcinolone (MYCOLOG II) cream Comments:   Reason for Stopping:               Time Spent on this Encounter   35 minutes spent " in discharge     Patient was evaluated on day of discharge by attending physician who agrees with plan of care.     Leana Rosales PA-C  Hospitalist Service  [facility]  Pager: [phone]

## 2018-06-06 NOTE — PROGRESS NOTES
Care Coordinator - Discharge Planning    Admission Date/Time:  6/1/2018  Attending MD:  Lorraine Kumar*     Data  Date of initial CC assessment:  6/6/18  Chart reviewed, discussed with interdisciplinary team.   Patient was admitted for:   1. Bacteremia         Assessment   Full assessment completed in previous note    Coordination of Care and Referrals: Provided patient/family with options for Home Infusion.    Spoke with patient and wife at bedside.  Patient will need IV abx.  [facility] notified and patient does not have coverage for home IV vancomycin Q 8 hrs.  Patient notified and will have to private pay.  [facility] liason updated and will see patient.  Patient was sent home with marina bag so that he can vent his G tube as needed.    [facility]  Phone  [phone]  Fax  [phone]      Plan  Anticipated Discharge Date:  today  Anticipated Discharge Plan:  Home with TPN and IV abx    CTS Handoff completed:  YES    Miri Caban RN, BSN  Care Coordinator [facility]  Pager: [phone]  Phone: [phone]

## 2018-06-07 ENCOUNTER — PATIENT OUTREACH (OUTPATIENT)
Dept: CARE COORDINATION | Facility: CLINIC | Age: 48
End: 2018-06-07

## 2018-06-07 LAB
BACTERIA SPEC CULT: ABNORMAL
BACTERIA SPEC CULT: NO GROWTH
Lab: ABNORMAL
Lab: ABNORMAL
Lab: NORMAL
SPECIMEN SOURCE: ABNORMAL
SPECIMEN SOURCE: ABNORMAL
SPECIMEN SOURCE: NORMAL

## 2018-06-07 NOTE — PROGRESS NOTES
This is a recent snapshot of the patient's [facility] medical record.  For current drug dose and complete information and questions, call [phone] or In Basket pool, [facility] ([id number])  CSN Number:  [id number]

## 2018-06-07 NOTE — PROGRESS NOTES
Patient was left message with return call back information by an RN for post DC follow up so no duplicate post DC follow up call will be made              Behl, Melissa K, RN   Primary Care - CC        Clinic Care Coordination Contact  [facility]         Clinical Data: Care Coordinator Outreach  Outreach attempted x 1.  Left message on voicemail with call back information and requested return call.  Plan: Care Coordinator mailed out care coordination introduction letter on 4/4/16. Care Coordinator will try to reach patient again in 1-2 business days.     Melissa Behl BSN, RN, PHN

## 2018-06-07 NOTE — PROGRESS NOTES
Clinic Care Coordination Contact    Clinic Care Coordination Contact  OUTREACH    Referral Information:  Referral Source: IP Report    Primary Diagnosis: SIRS/Sepsis    Chief Complaint   Patient presents with     Clinic Care Coordination - Post Hospital     RN        [place] Utilization:   Clinic Utilization  Difficulty keeping appointments:: No  Utilization    Last refreshed: 6/7/2018  1:23 PM:  No Show Count (past year) 2       Last refreshed: 6/7/2018  1:23 PM:  ED visits 3       Last refreshed: 6/7/2018  1:23 PM:  Hospital admissions 4          Current as of: 6/7/2018  1:23 PM             Clinical Concerns:  Current Medical Concerns:  Patient with recent hospitalization at [facility] 6/1/18-6/6/18 for bacteremia.  Patient's Cm was discontinued 6/4/18 and a PICC line was placed 6/6/18.  Spouse states the RN from [facility] was out 6/7/18 and patient will remain in [facility] services due to chronic TPN and IV vancomycin.  Spouse states temp was 100.6 last night and the home infusion nurse informed the providers.  Patient has remain afebrile since last night.  Patient has a future infectious disease appointment scheduled for 6/21/18, but does not have a PCP hospital follow up appointment scheduled.  RN CC assisted spouse in scheduling an appointment for 6/14/18.  No other concerns or questions at this time.    Current Behavioral Concerns: Patient with diagnosis of ADHD managed by PCP.    Education Provided to patient: RN CC reviewed hospital discharge instructions with spouse and informed her of needed appointment with PCP.   Pain  Chronic pain (GOAL):: Yes  Location of chronic pain:: chronic abdominal pain  Chronic pain timing:: Constant  Chronic pain severity:: 5  Limitation of routine activities due to chronic pain:: Yes  Health Maintenance Reviewed: Due/Overdue   Clinical Pathway: None    Medication Management:  Patient's spouse manages patient's medications.     Functional  Status:  Dependent ADLs:: Ambulation-cane  Bed or wheelchair confined:: No  Mobility Status: Independent    Living Situation:  Current living arrangement:: I live in a private home with spouse  Type of residence:: Private home - stairs    Diet/Exercise/Sleep:  Diet:: Regular  Inadequate nutrition (GOAL):: No  Food Insecurity: No  Tube Feeding: No  Exercise:: Currently not exercising  Inadequate activity/exercise (GOAL):: No  Significant changes in sleep pattern (GOAL): No    Transportation:  Transportation concerns (GOAL):: No  Transportation means:: Regular car     Psychosocial:  Mormonism or spiritual beliefs that impact treatment:: No  Mental health DX:: Yes  Mental health DX how managed:: Medication  Mental health management concern (GOAL):: No  Informal Support system:: Family, Spouse     Financial/Insurance:   Financial/Insurance concerns (GOAL):: No  No concerns at this time.     Resources and Interventions:  Current Resources:    ;   Community Resources: Home Infusion     Equipment Currently Used at Home: cane, straight    Advance Care Plan/Directive  Advanced Care Plans/Directives on file:: Yes  Type Advanced Care Plans/Directives: Advanced Directive - On File  Advanced Care Plan/Directive Status: Not Applicable          Goals: None at this time.        Patient/Caregiver understanding: Spouse has a moderate understanding of patient's plan of care, but benefits from ongoing care coordination due to frequent hospitalizations and multiple specialties and appointments.      Outreach Frequency: monthly  Future Appointments              In 6 days Esteban Daly MD [facility] [place]    In 2 weeks Gabbi Thompson MD [facility] and Infectious Diseases, [facility]    In 2 weeks Francis Vyas MD [facility] Surgical Weight Management, [facility]    In 2 weeks Patti Milligan MD [facility] [place], [facility]LA[facility]          Plan:   1. Patient will follow up  with PCP and infectious disease as scheduled.  2. RN CC will follow up with patient in 3 weeks following his appointments.  3. RN CC sent updated access plan to patient via [employer]    Melissa Behl BSN, RN, N  [facility] Care Coordinator  [phone]

## 2018-06-07 NOTE — PROGRESS NOTES
Clinic Care Coordination Contact  [facility]/Voicemail       Clinical Data: Care Coordinator Outreach  Outreach attempted x 1.  Left message on voicemail with call back information and requested return call.  Plan: Care Coordinator mailed out care coordination introduction letter on 4/4/16. Care Coordinator will try to reach patient again in 1-2 business days.    Melissa Behl BSN, RN, [facility] Care Coordinator  [phone]

## 2018-06-07 NOTE — LETTER
Health Care Home - Access Care Plan    About Me  Patient Name:  Parker Acevedo Sr    YOB: 1970  Age:                             47 year old   Divine MRN:            [id number] Telephone Information:     Home Phone [phone]   Mobile [phone]       Address:    [address] [place] [address]  [place] [address] Email address:  [email or web address]      Emergency Contact(s)  Name Relationship Lgl Grd Work Phone Home Phone Mobile Phone   1. NAHUM ACEVEDO* Spouse   [phone]   2. MARELY ACEVEDO* Mother   [phone]             Health Maintenance:      My Access Plan  Medical Emergency 911   Questions or concerns during clinic hours Primary Clinic Line, I will call the clinic directly: [facility] - [phone]   24 Hour Appointment Line [phone] or  [phone] ([phone]) (toll free)   24 Hour Nurse Line [phone] (toll free)   Questions or concerns outside clinic hours 24 Hour Appointment Line, I will call the after-hours on-call line:   [facility] [phone] or [phone] ([phone]) (toll-free)   Preferred Urgent Care     Preferred Hospital [facility]  [phone]   Preferred Pharmacy [facility] [place], [place] - [address] [facility]     Behavioral Health Crisis Line The National Suicide Prevention Lifeline at 1-869.929.7224 or 911     My Care Team Members  Patient Care Team       Relationship Specialty Notifications Start End    Esteban Daly MD PCP - General Family Practice  6/2/15     Comment:  Merged    Phone: [phone] Fax: [phone] [address] [facility] [place] [address]    Francis Vyas MD Referring Physician Surgery  4/9/15     Comment:  GI     Phone: [phone] Fax: [phone]         [address] [facility] [address] [facility] [address]    Esteban Daly MD Referring Physician Family Practice  4/9/15     Phone: [phone] Fax:  [phone] [address] [facility] [address]    Esteban Daly MD   Family Practice  6/2/15     Comment:  Merged    Phone: [phone] Fax: [phone] [address] [place] [address]    Bill Brumfield MD MD Gastroenterology  6/2/15     Phone: [phone] Fax: [phone]         [address] [facility] [address] [facility] [address]    Patti Milligan MD MD Pain Clinic  6/2/15     Phone: [phone] Fax: [phone] [address] [place] [address] [facility] [address]    Behl, Melissa K, RN Clinic Care Coordinator Primary Care - CC Admissions 3/28/18     Phone: [phone] Fax: [phone]               My Medical and Care Information  Problem List   Patient Active Problem List   Diagnosis     Peptic ulcer disease     Gastric bypass status for obesity     CARDIOVASCULAR SCREENING; LDL GOAL LESS THAN 160     Chronic abdominal pain     Ulcer (H)     Vomiting     Anemia     ADHD (attention deficit hyperactivity disorder), inattentive type     Vitamin B12 deficiency without anemia     Thiamine deficiency     Dehydration     Dysphagia     Weight loss, non-intentional     Malnutrition (H)     Chronic anxiety     Constipation     Bile reflux esophagitis     Former smoker     Vitamin D deficiency     Iron deficiency     Health Care Home     Chronic pain     Insomnia     Positive blood culture     Fungemia     Chronic nausea     Gastrostomy tube in place (H)     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)     Severe malnutrition (H)     Short gut syndrome     Anxiety     Status post cervical spinal arthrodesis     Port or reservoir infection, initial encounter     Abnormal echocardiogram     Bacteremia     Low serum iron     Anemia, iron deficiency     Catheter-related bloodstream infection (CRBSI)     E coli bacteremia     Generalized weakness      Current Medications and Allergies:  See printed Medication Report

## 2018-06-08 ENCOUNTER — HOME INFUSION (PRE-WILLOW HOME INFUSION) (OUTPATIENT)
Dept: PHARMACY | Facility: CLINIC | Age: 48
End: 2018-06-08

## 2018-06-08 LAB
ANION GAP SERPL CALCULATED.3IONS-SCNC: 10 MMOL/L (ref 3–14)
BUN SERPL-MCNC: 7 MG/DL (ref 7–30)
CALCIUM SERPL-MCNC: 8.4 MG/DL (ref 8.5–10.1)
CHLORIDE SERPL-SCNC: 110 MMOL/L (ref 94–109)
CO2 SERPL-SCNC: 27 MMOL/L (ref 20–32)
CREAT SERPL-MCNC: 0.67 MG/DL (ref 0.66–1.25)
GFR SERPL CREATININE-BSD FRML MDRD: >90 ML/MIN/1.7M2
GLUCOSE SERPL-MCNC: 71 MG/DL (ref 70–99)
MAGNESIUM SERPL-MCNC: 1.9 MG/DL (ref 1.6–2.3)
PHOSPHATE SERPL-MCNC: 2.5 MG/DL (ref 2.5–4.5)
POTASSIUM SERPL-SCNC: 3.3 MMOL/L (ref 3.4–5.3)
SODIUM SERPL-SCNC: 147 MMOL/L (ref 133–144)
VANCOMYCIN SERPL-MCNC: 12.9 MG/L

## 2018-06-08 PROCEDURE — 80202 ASSAY OF VANCOMYCIN: CPT | Performed by: SURGERY

## 2018-06-08 PROCEDURE — 84100 ASSAY OF PHOSPHORUS: CPT | Performed by: SURGERY

## 2018-06-08 PROCEDURE — 83735 ASSAY OF MAGNESIUM: CPT | Performed by: SURGERY

## 2018-06-08 PROCEDURE — 80048 BASIC METABOLIC PNL TOTAL CA: CPT | Performed by: SURGERY

## 2018-06-09 ENCOUNTER — HOME INFUSION (PRE-WILLOW HOME INFUSION) (OUTPATIENT)
Dept: PHARMACY | Facility: CLINIC | Age: 48
End: 2018-06-09

## 2018-06-11 ENCOUNTER — HOSPITAL ENCOUNTER (OUTPATIENT)
Dept: LAB | Facility: CLINIC | Age: 48
Discharge: HOME | End: 2018-06-11
Attending: SURGERY | Admitting: SURGERY
Payer: COMMERCIAL

## 2018-06-11 ENCOUNTER — HOME INFUSION (PRE-WILLOW HOME INFUSION) (OUTPATIENT)
Dept: PHARMACY | Facility: CLINIC | Age: 48
End: 2018-06-11

## 2018-06-11 LAB
ALBUMIN SERPL-MCNC: 3.5 G/DL (ref 3.4–5)
ALP SERPL-CCNC: 85 U/L (ref 40–150)
ALT SERPL W P-5'-P-CCNC: 17 U/L (ref 0–70)
ANION GAP SERPL CALCULATED.3IONS-SCNC: 8 MMOL/L (ref 3–14)
AST SERPL W P-5'-P-CCNC: 11 U/L (ref 0–45)
BASOPHILS # BLD AUTO: 0.1 10E9/L (ref 0–0.2)
BASOPHILS NFR BLD AUTO: 0.8 %
BILIRUB DIRECT SERPL-MCNC: 0.1 MG/DL (ref 0–0.2)
BILIRUB SERPL-MCNC: 0.5 MG/DL (ref 0.2–1.3)
BUN SERPL-MCNC: 8 MG/DL (ref 7–30)
CALCIUM SERPL-MCNC: 8.6 MG/DL (ref 8.5–10.1)
CHLORIDE SERPL-SCNC: 106 MMOL/L (ref 94–109)
CO2 SERPL-SCNC: 28 MMOL/L (ref 20–32)
CREAT SERPL-MCNC: 0.81 MG/DL (ref 0.66–1.25)
DIFFERENTIAL METHOD BLD: ABNORMAL
EOSINOPHIL NFR BLD AUTO: 4.5 %
ERYTHROCYTE [DISTWIDTH] IN BLOOD BY AUTOMATED COUNT: 12.7 % (ref 10–15)
GFR SERPL CREATININE-BSD FRML MDRD: >90 ML/MIN/1.7M2
GLUCOSE SERPL-MCNC: 74 MG/DL (ref 70–99)
HCT VFR BLD AUTO: 37.8 % (ref 40–53)
HGB BLD-MCNC: 12.5 G/DL (ref 13.3–17.7)
IMM GRANULOCYTES # BLD: 0 10E9/L (ref 0–0.4)
IMM GRANULOCYTES NFR BLD: 0.5 %
LYMPHOCYTES # BLD AUTO: 2.1 10E9/L (ref 0.8–5.3)
LYMPHOCYTES NFR BLD AUTO: 29 %
MAGNESIUM SERPL-MCNC: 2 MG/DL (ref 1.6–2.3)
MCH RBC QN AUTO: 31.2 PG (ref 26.5–33)
MCHC RBC AUTO-ENTMCNC: 33.1 G/DL (ref 31.5–36.5)
MCV RBC AUTO: 94 FL (ref 78–100)
MONOCYTES # BLD AUTO: 0.9 10E9/L (ref 0–1.3)
MONOCYTES NFR BLD AUTO: 11.6 %
NEUTROPHILS # BLD AUTO: 3.9 10E9/L (ref 1.6–8.3)
NEUTROPHILS NFR BLD AUTO: 53.6 %
NRBC # BLD AUTO: 0 10*3/UL
NRBC BLD AUTO-RTO: 0 /100
PHOSPHATE SERPL-MCNC: 3.6 MG/DL (ref 2.5–4.5)
PLATELET # BLD AUTO: 190 10E9/L (ref 150–450)
POTASSIUM SERPL-SCNC: 3.8 MMOL/L (ref 3.4–5.3)
PROT SERPL-MCNC: 7.1 G/DL (ref 6.8–8.8)
RBC # BLD AUTO: 4.01 10E12/L (ref 4.4–5.9)
SODIUM SERPL-SCNC: 142 MMOL/L (ref 133–144)
TRIGL SERPL-MCNC: 84 MG/DL
VANCOMYCIN SERPL-MCNC: 18.2 MG/L
WBC # BLD AUTO: 7.4 10E9/L (ref 4–11)

## 2018-06-11 PROCEDURE — 85025 COMPLETE CBC W/AUTO DIFF WBC: CPT | Performed by: SURGERY

## 2018-06-11 PROCEDURE — 80202 ASSAY OF VANCOMYCIN: CPT | Performed by: SURGERY

## 2018-06-11 PROCEDURE — 83735 ASSAY OF MAGNESIUM: CPT | Performed by: SURGERY

## 2018-06-11 PROCEDURE — 82248 BILIRUBIN DIRECT: CPT | Performed by: SURGERY

## 2018-06-11 PROCEDURE — 84100 ASSAY OF PHOSPHORUS: CPT | Performed by: SURGERY

## 2018-06-11 PROCEDURE — 80053 COMPREHEN METABOLIC PANEL: CPT | Performed by: SURGERY

## 2018-06-11 PROCEDURE — 84134 ASSAY OF PREALBUMIN: CPT | Performed by: SURGERY

## 2018-06-11 PROCEDURE — 84478 ASSAY OF TRIGLYCERIDES: CPT | Performed by: SURGERY

## 2018-06-11 NOTE — PROGRESS NOTES
This is a recent snapshot of the patient's [facility] medical record.  For current drug dose and complete information and questions, call [phone] or In Basket pool, [facility] ([id number])  CSN Number:  [id number]

## 2018-06-11 NOTE — PROGRESS NOTES
This is a recent snapshot of the patient's [facility] medical record.  For current drug dose and complete information and questions, call [phone] or In Basket pool, [facility] home infusion ([id number])  CSN Number:  [id number]

## 2018-06-12 ENCOUNTER — HOME INFUSION (PRE-WILLOW HOME INFUSION) (OUTPATIENT)
Dept: PHARMACY | Facility: CLINIC | Age: 48
End: 2018-06-12

## 2018-06-12 ENCOUNTER — TELEPHONE (OUTPATIENT)
Dept: PHARMACY | Facility: OTHER | Age: 48
End: 2018-06-12

## 2018-06-12 ENCOUNTER — TELEPHONE (OUTPATIENT)
Dept: INFECTIOUS DISEASES | Facility: CLINIC | Age: 48
End: 2018-06-12

## 2018-06-12 LAB — PREALB SERPL IA-MCNC: 18 MG/DL (ref 15–45)

## 2018-06-12 NOTE — PROGRESS NOTES
This is a recent snapshot of the patient's [facility] medical record.  For current drug dose and complete information and questions, call [phone] or In [facility] pool, [facility] home infusion ([id number])  CSN Number:  [id number]

## 2018-06-12 NOTE — TELEPHONE ENCOUNTER
[facility] Call [facility]    Phone Message    May a detailed message be left on voicemail: no    Reason for Call: Other: Karo with [facility] would like a call back to discuss pt's rx vancomycin and the length of therapy. Please f/u at [phone].     Action Taken: Message routed to:  [facility] ([facility]): ID

## 2018-06-12 NOTE — TELEPHONE ENCOUNTER
[facility] referral from: Transitions of Care (recent hospital discharge or ED visit)    [facility] referral outreach attempt #2 on June 12, 2018 at 2:23 PM      Outcome: Patient is not interested at this time because he has too many appointments, will route to [facility] Pharmacist/Provider as an FYI. Thank you for the referral.     Alejandra Smith, [facility] Coordinator

## 2018-06-13 ENCOUNTER — HOME INFUSION (PRE-WILLOW HOME INFUSION) (OUTPATIENT)
Dept: PHARMACY | Facility: CLINIC | Age: 48
End: 2018-06-13

## 2018-06-13 NOTE — PROGRESS NOTES
This is a recent snapshot of the patient's [facility] medical record.  For current drug dose and complete information and questions, call [phone] or In [facility] pool, [facility] home infusion ([id number])  CSN Number:  [id number]

## 2018-06-14 ENCOUNTER — HOME INFUSION (PRE-WILLOW HOME INFUSION) (OUTPATIENT)
Dept: PHARMACY | Facility: CLINIC | Age: 48
End: 2018-06-14

## 2018-06-14 ENCOUNTER — HOSPITAL ENCOUNTER (OUTPATIENT)
Dept: LAB | Facility: CLINIC | Age: 48
Discharge: HOME | End: 2018-06-14
Attending: SURGERY | Admitting: SURGERY
Payer: COMMERCIAL

## 2018-06-14 LAB
CREAT SERPL-MCNC: 0.74 MG/DL (ref 0.66–1.25)
GFR SERPL CREATININE-BSD FRML MDRD: >90 ML/MIN/1.7M2
VANCOMYCIN SERPL-MCNC: 20.4 MG/L

## 2018-06-14 PROCEDURE — 82565 ASSAY OF CREATININE: CPT | Performed by: SURGERY

## 2018-06-14 PROCEDURE — 80202 ASSAY OF VANCOMYCIN: CPT | Performed by: SURGERY

## 2018-06-15 ENCOUNTER — HOME INFUSION (PRE-WILLOW HOME INFUSION) (OUTPATIENT)
Dept: PHARMACY | Facility: CLINIC | Age: 48
End: 2018-06-15

## 2018-06-15 NOTE — PROGRESS NOTES
This is a recent snapshot of the patient's [facility] medical record.  For current drug dose and complete information and questions, call [phone] or In [facility] pool, fv home infusion ([id number])  CSN Number:  [id number]

## 2018-06-15 NOTE — TELEPHONE ENCOUNTER
Called I to let them know Dr Thompson would like to stop his IV Vanco on 6/16 but retain PICC until she sees him in clinic on 6/21 (however pt using PICC for TPN right now). [facility] verbalized understanding of plan.  Cristina Fregoso RN

## 2018-06-15 NOTE — PROGRESS NOTES
This is a recent snapshot of the patient's [facility] medical record.  For current drug dose and complete information and questions, call [phone] or In Basket pool, [facility] home infusion ([id number])  CSN Number:  [id number]

## 2018-06-18 ENCOUNTER — HOME INFUSION (PRE-WILLOW HOME INFUSION) (OUTPATIENT)
Dept: PHARMACY | Facility: CLINIC | Age: 48
End: 2018-06-18

## 2018-06-18 ENCOUNTER — HOSPITAL ENCOUNTER (OUTPATIENT)
Dept: LAB | Facility: CLINIC | Age: 48
Discharge: HOME | End: 2018-06-18
Attending: SURGERY | Admitting: SURGERY
Payer: COMMERCIAL

## 2018-06-18 LAB
ALBUMIN SERPL-MCNC: 3.4 G/DL (ref 3.4–5)
ALP SERPL-CCNC: 85 U/L (ref 40–150)
ALT SERPL W P-5'-P-CCNC: 17 U/L (ref 0–70)
ANION GAP SERPL CALCULATED.3IONS-SCNC: 10 MMOL/L (ref 3–14)
AST SERPL W P-5'-P-CCNC: 12 U/L (ref 0–45)
BASOPHILS # BLD AUTO: 0 10E9/L (ref 0–0.2)
BASOPHILS NFR BLD AUTO: 0.7 %
BILIRUB DIRECT SERPL-MCNC: 0.2 MG/DL (ref 0–0.2)
BILIRUB SERPL-MCNC: 0.5 MG/DL (ref 0.2–1.3)
BUN SERPL-MCNC: 7 MG/DL (ref 7–30)
CALCIUM SERPL-MCNC: 8.3 MG/DL (ref 8.5–10.1)
CHLORIDE SERPL-SCNC: 107 MMOL/L (ref 94–109)
CO2 SERPL-SCNC: 28 MMOL/L (ref 20–32)
CREAT SERPL-MCNC: 0.65 MG/DL (ref 0.66–1.25)
DIFFERENTIAL METHOD BLD: ABNORMAL
EOSINOPHIL NFR BLD AUTO: 4 %
ERYTHROCYTE [DISTWIDTH] IN BLOOD BY AUTOMATED COUNT: 12.8 % (ref 10–15)
GFR SERPL CREATININE-BSD FRML MDRD: >90 ML/MIN/1.7M2
GLUCOSE SERPL-MCNC: 88 MG/DL (ref 70–99)
HCT VFR BLD AUTO: 38.1 % (ref 40–53)
HGB BLD-MCNC: 12.2 G/DL (ref 13.3–17.7)
IMM GRANULOCYTES # BLD: 0 10E9/L (ref 0–0.4)
IMM GRANULOCYTES NFR BLD: 0.2 %
LYMPHOCYTES # BLD AUTO: 1.8 10E9/L (ref 0.8–5.3)
LYMPHOCYTES NFR BLD AUTO: 32.6 %
MAGNESIUM SERPL-MCNC: 1.8 MG/DL (ref 1.6–2.3)
MCH RBC QN AUTO: 30.7 PG (ref 26.5–33)
MCHC RBC AUTO-ENTMCNC: 32 G/DL (ref 31.5–36.5)
MCV RBC AUTO: 96 FL (ref 78–100)
MONOCYTES # BLD AUTO: 0.7 10E9/L (ref 0–1.3)
MONOCYTES NFR BLD AUTO: 12.1 %
NEUTROPHILS # BLD AUTO: 2.8 10E9/L (ref 1.6–8.3)
NEUTROPHILS NFR BLD AUTO: 50.4 %
NRBC # BLD AUTO: 0 10*3/UL
NRBC BLD AUTO-RTO: 0 /100
PHOSPHATE SERPL-MCNC: 3.1 MG/DL (ref 2.5–4.5)
PLATELET # BLD AUTO: 129 10E9/L (ref 150–450)
POTASSIUM SERPL-SCNC: 3.3 MMOL/L (ref 3.4–5.3)
PROT SERPL-MCNC: 6.8 G/DL (ref 6.8–8.8)
RBC # BLD AUTO: 3.97 10E12/L (ref 4.4–5.9)
SODIUM SERPL-SCNC: 145 MMOL/L (ref 133–144)
TRIGL SERPL-MCNC: 71 MG/DL
WBC # BLD AUTO: 5.5 10E9/L (ref 4–11)

## 2018-06-18 PROCEDURE — 85025 COMPLETE CBC W/AUTO DIFF WBC: CPT | Performed by: SURGERY

## 2018-06-18 PROCEDURE — 84478 ASSAY OF TRIGLYCERIDES: CPT | Performed by: SURGERY

## 2018-06-18 PROCEDURE — 82248 BILIRUBIN DIRECT: CPT | Performed by: SURGERY

## 2018-06-18 PROCEDURE — 84134 ASSAY OF PREALBUMIN: CPT | Performed by: SURGERY

## 2018-06-18 PROCEDURE — 83735 ASSAY OF MAGNESIUM: CPT | Performed by: SURGERY

## 2018-06-18 PROCEDURE — 80053 COMPREHEN METABOLIC PANEL: CPT | Performed by: SURGERY

## 2018-06-18 PROCEDURE — 84100 ASSAY OF PHOSPHORUS: CPT | Performed by: SURGERY

## 2018-06-18 NOTE — PROGRESS NOTES
Brief Medicine Note    Patient with recurrent IV infiltration. Assessed infiltration at bedside. Fore arm swelling with mild  100cc D5 0.45% ran into IV. TTP. ~ Currently no PIV access. Discussed plan with ID and okay to use the Cm catheter for Vancomycin. Would not recommend additional BC or lines placed while Cm in place. Plan to pull Cm and will need additional access for outpatient IV abx. Discussed plan with bedside RN.      Linda Rachel PA-C  Internal Medicine Hospitalist Service  [facility]  Pager: [phone]     EMERGENCY DEPARTMENT HISTORY AND PHYSICAL EXAM      Date: 6/18/2018  Patient Name: Makayla Box    History of Presenting Illness     Chief Complaint   Patient presents with    Abdominal Pain     pt arrives by EMS with reports of abdominal pain off and on x1 month, was seen by PCP last week and this week, was sent to ED for eval of abdominal pain and hypotension    Hypotension       History Provided By: Patient and EMS    HPI: Makayla Box, 81 y.o. female with PMHx significant for A Fib, presents by EMS to the ED with cc of intermittent, right sided, lower, cramping abdominal pain x 1 month. Pt endorses associated abdominal distention and N/V. Pt reports that she is also having diarrhea, but this is an ongoing issue for which she has been previously evaluated. Pt denies any exacerbating or alleviating factors. Pt explains that she has been having intermittent abdominal pain for the past month for which she has been evaluated by her PCP twice within the past 2 weeks. Pt reports that she was found to be hypotensive with continued abdominal pain at her PCP evaluation today, so her PCP instructed her to come into the ED for further testing. Pt states that she is currently on Xarelto after having a pacemaker placed several years ago. Pt notes that she has had recent weight loss over the past several months. Pt denies any Hx of coronary stents. Pt endorses normal PO intake. Pt denies lightheadedness or SOB. There are no other complaints, changes, or physical findings at this time.     PCP: Codie Garcia MD    Current Facility-Administered Medications   Medication Dose Route Frequency Provider Last Rate Last Dose    sodium chloride (NS) flush 5-10 mL  5-10 mL IntraVENous Q8H Melly Hart MD        sodium chloride (NS) flush 5-10 mL  5-10 mL IntraVENous PRN Melly Hart MD        0.9% sodium chloride infusion  75 mL/hr IntraVENous CONTINUOUS Melly Hart MD        acetaminophen (TYLENOL) tablet 650 mg  650 mg Oral Q4H PRN Bandar Ahmadi MD        naloxone [facility]) injection 0.4 mg  0.4 mg IntraVENous PRN Bandar Ahmadi MD        HYDROcodone-acetaminophen (NORCO) 5-325 mg per tablet 1 Tab  1 Tab Oral Q4H PRN Bandar Ahmadi MD        [facility]ansetron [facility]) injection 4 mg  4 mg IntraVENous Q4H PRN Bandar Ahmadi MD        bisacodyl (DULCOLAX) suppository 10 mg  10 mg Rectal DAILY PRN Bandar Ahmadi MD         Current Outpatient Prescriptions   Medication Sig Dispense Refill    Calcium-Cholecalciferol, D3, (CALCIUM 600 WITH VITAMIN D3) 600 mg(1,500mg) -400 unit cap Take 1 Tab by mouth daily.  alendronate (FOSAMAX) 70 mg tablet Take 1 Tab by mouth every seven (7) days. 12 Tab 3    spironolactone (ALDACTONE) 25 mg tablet Take 1 Tab by mouth daily. Indications: chronic heart failure 90 Tab 1    amiodarone (CORDARONE) 200 mg tablet Take 1 Tab by mouth daily. 90 Tab 1    carvedilol (COREG) 6.25 mg tablet Take 1 Tab by mouth two (2) times daily (with meals). 180 Tab 1    furosemide (LASIX) 20 mg tablet TAKE 1 TABLET BY MOUTH DAILY 90 Tab 1    sacubitril-valsartan (ENTRESTO) 24 mg/26 mg tablet Take 1 Tab by mouth two (2) times a day.  180 Tab 1    sertraline (ZOLOFT) 50 mg tablet TAKE 1 TABLET BY MOUTH DAILY 90 Tab 4    rivaroxaban (XARELTO) 20 mg tab tablet TAKE 1 TABLET BY MOUTH EVERY DAY 90 Tab 3       Past History     Past Medical History:  Past Medical History:   Diagnosis Date    Atrial fibrillation ([address]     Cardiomyopathy, nonischemic (HCC)     Congestive heart failure, unspecified     Depression     History of amiodarone therapy 12/30/2011    ICD (implantable cardiac defibrillator) in place 9/18/09    [facility] [address]       Past Surgical History:  Past Surgical History:   Procedure Laterality Date    ECHO 2D ADULT  4/1/2011    EF 30%, mild to moderate LA enlargement, mild MR    HX HEART CATHETERIZATION  2006    no CAD, EF 25%    HX PACEMAKER      HX PACEMAKER PLACEMENT  2009    IR FLUOROSCOPY < 1 HOUR 7/30/2012         DEBBI US BX BREAST LT 1ST LESION W/CLIP AND SPECIMEN Left     2015--neg    KS REMV TRANSVEN PACER ELECTRODE,1 LEAD  6/21/2016            Family History:  Family History   Problem Relation Age of Onset    High Cholesterol Mother        Social History:  Social History   Substance Use Topics    Smoking status: Never Smoker    Smokeless tobacco: Never Used    Alcohol use 2.5 oz/week     5 Cans of beer per week      Comment: occasional       Allergies:  No Known Allergies      Review of Systems   Review of Systems   Constitutional: Negative for chills and fever. HENT: Negative for congestion. Eyes: Negative for visual disturbance. Respiratory: Negative for chest tightness. Cardiovascular: Negative for chest pain and leg swelling. Gastrointestinal: Positive for abdominal distention, abdominal pain, nausea and vomiting. Endocrine: Negative for polyuria. Genitourinary: Negative for dysuria and frequency. Musculoskeletal: Negative for myalgias. Skin: Negative for color change. Allergic/Immunologic: Negative for immunocompromised state. Neurological: Negative for numbness. Physical Exam   Physical Exam   Nursing note and vitals reviewed.   General appearance: non-toxic  Eyes: PERRL, EOMI, conjunctiva normal, anicteric sclera  HEENT: mucous membranes tacky, lips are dry, oropharynx is clear, dentures in place  Pulmonary: clear to auscultation bilaterally  Cardiac: normal rate and regular rhythm, no murmurs, gallops, or rubs, 2+DP pulses, 2+ radial pulses  Abdomen: soft, TTP LUQ and RLQ, mildly tympanitic to percussion, nondistended, bowel sounds present, no rebound or guarding  MSK: no pre-tibial edema  Neuro: Alert, answers questions appropriately  Skin: capillary refill brisk    Diagnostic Study Results     Labs -     Recent Results (from the past 12 hour(s))   CBC WITH AUTOMATED DIFF    Collection Time: 06/18/18 12:49 PM   Result Value Ref Range    WBC 6.5 3.6 - 11.0 K/uL RBC 3.53 (L) 3.80 - 5.20 M/uL    HGB 11.7 11.5 - 16.0 g/dL    HCT 35.3 35.0 - 47.0 %    .0 (H) 80.0 - 99.0 FL    MCH 33.1 26.0 - 34.0 PG    MCHC 33.1 30.0 - 36.5 g/dL    RDW 12.3 11.5 - 14.5 %    PLATELET 200 468 - 220 K/uL    MPV 9.2 8.9 - 12.9 FL    NRBC 0.0 0  WBC    ABSOLUTE NRBC 0.00 0.00 - 0.01 K/uL    NEUTROPHILS 55 32 - 75 %    LYMPHOCYTES 33 12 - 49 %    MONOCYTES 10 5 - 13 %    EOSINOPHILS 1 0 - 7 %    BASOPHILS 1 0 - 1 %    IMMATURE GRANULOCYTES 0 0.0 - 0.5 %    ABS. NEUTROPHILS 3.6 1.8 - 8.0 K/UL    ABS. LYMPHOCYTES 2.2 0.8 - 3.5 K/UL    ABS. MONOCYTES 0.6 0.0 - 1.0 K/UL    ABS. EOSINOPHILS 0.1 0.0 - 0.4 K/UL    ABS. BASOPHILS 0.0 0.0 - 0.1 K/UL    ABS. IMM. GRANS. 0.0 0.00 - 0.04 K/UL    DF AUTOMATED     METABOLIC PANEL, COMPREHENSIVE    Collection Time: 06/18/18 12:49 PM   Result Value Ref Range    Sodium 142 136 - 145 mmol/L    Potassium 3.9 3.5 - 5.1 mmol/L    Chloride 106 97 - 108 mmol/L    CO2 26 21 - 32 mmol/L    Anion gap 10 5 - 15 mmol/L    Glucose 119 (H) 65 - 100 mg/dL    BUN 14 6 - 20 MG/DL    Creatinine 0.88 0.55 - 1.02 MG/DL    BUN/Creatinine ratio 16 12 - 20      GFR est AA >60 >60 ml/min/1.73m2    GFR est non-AA >60 >60 ml/min/1.73m2    Calcium 9.5 8.5 - 10.1 MG/DL    Bilirubin, total 0.6 0.2 - 1.0 MG/DL    ALT (SGPT) 21 12 - 78 U/L    AST (SGOT) 16 15 - 37 U/L    Alk.  phosphatase 73 45 - 117 U/L    Protein, total 7.5 6.4 - 8.2 g/dL    Albumin 3.7 3.5 - 5.0 g/dL    Globulin 3.8 2.0 - 4.0 g/dL    A-G Ratio 1.0 (L) 1.1 - 2.2     LIPASE    Collection Time: 06/18/18 12:49 PM   Result Value Ref Range    Lipase 1443 (H) 73 - 393 U/L   NT-PRO BNP    Collection Time: 06/18/18 12:49 PM   Result Value Ref Range    NT pro- 0 - 125 PG/ML   URINALYSIS W/ REFLEX CULTURE    Collection Time: 06/18/18  1:26 PM   Result Value Ref Range    Color YELLOW/STRAW      Appearance CLEAR CLEAR      Specific gravity 1.009 1.003 - 1.030      pH (UA) 7.0 5.0 - 8.0      Protein NEGATIVE  NEG mg/dL    Glucose NEGATIVE  NEG mg/dL    Ketone NEGATIVE  NEG mg/dL    Bilirubin NEGATIVE  NEG      Blood NEGATIVE  NEG      Urobilinogen 1.0 0.2 - 1.0 EU/dL    Nitrites NEGATIVE  NEG      Leukocyte Esterase NEGATIVE  NEG      WBC 0-4 0 - 4 /hpf    RBC 0-5 0 - 5 /hpf    Epithelial cells FEW FEW /lpf    Bacteria 1+ (A) NEG /hpf    UA:UC IF INDICATED URINE CULTURE ORDERED (A) CNI      Hyaline cast 0-2 0 - 5 /lpf   EKG, 12 LEAD, INITIAL    Collection Time: 06/18/18  2:31 PM   Result Value Ref Range    Ventricular Rate 60 BPM    Atrial Rate 38 BPM    P-R Interval 226 ms    QRS Duration 150 ms    Q-T Interval 512 ms    QTC Calculation (Bezet) 512 ms    Calculated P Axis -17 degrees    Calculated R Axis -133 degrees    Calculated T Axis 55 degrees    Diagnosis       ** Poor data quality, interpretation may be adversely affected  Recommend repeat  AV dual-paced rhythm with prolonged AV conduction  Biventricular pacemaker detected  When compared with ECG of 21-JUN-2016 19:17,  Electronic ventricular pacemaker has replaced Sinus rhythm  Confirmed by Christiano Quiñones MD, [facility] ([id number]) on 6/18/2018 3:37:16 PM         Radiologic Studies -     CT Results  (Last 48 hours)               06/18/18 1412  CT ABD PELV W CONT Final result    Impression:  IMPRESSION:    1. Low-attenuation infiltrative mass in the head of the pancreas with   involvement of the superior mesenteric vessels and occlusion of the superior   mesenteric vein consistent with pancreatic carcinoma. 2. Peripancreatic adenopathy. 3. Lumbar spondylosis. Narrative:  EXAM: CT ABDOMEN PELVIS WITH CONTRAST   INDICATION: Right lower quadrant abdominal pain. .   COMPARISON: 2/27/2010. CONTRAST: 100 mL of Isovue-370. TECHNIQUE:    Multislice helical CT was performed from the diaphragm to the symphysis pubis   during uneventful rapid bolus intravenous contrast administration. Oral contrast   was not administered.  Contiguous 5 mm axial images were reconstructed and lung   and soft tissue windows were generated. Coronal and sagittal reformations were   generated. CT dose reduction was achieved through use of a standardized protocol   tailored for this examination and automatic exposure control for dose   modulation. FINDINGS:    There is a pacemaker in place. LOWER CHEST: The visualized portions of the lung bases are clear. ABDOMEN:   Liver: The liver is normal in size and contour with no focal abnormality. Gallbladder and bile ducts: There is no calcified gallstone or biliary   dilatation. Spleen: No abnormality. Pancreas: There is a new 2.9 x 2.6 x 3.6 cm mass in the head and uncinate   process of the pancreas with partial encasement of the superior mesenteric   vessels and occlusion of the superior mesenteric vein. Adrenal glands: No abnormality. Kidneys: No abnormality. PELVIS:   Reproductive organs: The uterus is normal. The left gonadal vein is enlarged. Bladder: No abnormality. BOWEL AND MESENTERY: The small bowel is normal.  There is no mesenteric mass or   adenopathy. The appendix is normal.   PERITONEUM: There is no ascites or free intraperitoneal air. RETROPERITONEUM: The aorta  tapers without aneurysm. There are peripancreatic   lymph nodes. There is no pelvic mass or adenopathy. BONES AND SOFT TISSUES: There are degenerative changes of the spine. Medical Decision Making   I am the first provider for this patient. I reviewed the vital signs, available nursing notes, past medical history, past surgical history, family history and social history. Vital Signs-Reviewed the patient's vital signs.   Patient Vitals for the past 12 hrs:   Temp Pulse Resp BP SpO2   06/18/18 1440 - 60 16 114/76 97 %   06/18/18 1425 - - - 91/60 -   06/18/18 1221 98.1 °F (36.7 °C) 63 16 101/71 100 %       Pulse Oximetry Analysis - 100% on RA    Cardiac Monitor:   Rate: 62 bpm  Rhythm: Normal Sinus Rhythm      EKG interpretation: (Preliminary) 14:31  Rhythm: AV dual paced rhythm with prolonged AV conduction; and irregular . Rate (approx.): 60 bpm; Axis: normal; P wave: normal; QRS interval: normal ; ST/T wave: no ST elevation, no ST depression;   NJ interval 226 ms,  ms,  ms, QTc 512 ms. Written by Simi Torres ED Scribe, as dictated by Cheral Sicard, MD.    Records Reviewed: Nursing Notes, Old Medical Records, Previous electrocardiograms, Ambulance Run Sheet, Previous Radiology Studies and Previous Laboratory Studies    Provider Notes (Medical Decision Making):   DDx: pancreatic mass noted on CT scan concerning for neoplasm. ED Course:   Initial assessment performed. The patients presenting problems have been discussed, and they are in agreement with the care plan formulated and outlined with them. I have encouraged them to ask questions as they arise throughout their visit. CONSULT NOTE:   4:09 PM  Madeline Salcido. Pb Rivas MD spoke with Hillary Schulte MD   Specialty: Hospitalist  Discussed pt's hx, disposition, and available diagnostic and imaging results. Reviewed care plans. Consultant will evaluate pt for admission. Written by Simi Torres ED Scribe, as dictated by Carlos Rivas MD.    Critical Care Time:   0    Disposition:  PLAN:  1. Admit to Hospitalist    ADMIT NOTE:  4:09 PM  Patient is being admitted to the hospital by Dr. Fran Fagan. The results of their tests and reasons for their admission have been discussed with them and/or available family. They convey agreement and understanding for the need to be admitted and for their admission diagnosis. Consultation has been made with the inpatient physician specialist for hospitalization. Diagnosis     Clinical Impression:   1. Other acute pancreatitis, unspecified complication status    2. Pancreatic mass        Attestations: This note is prepared by Jefe Jain, acting as Scribe for Madeline Salcido. Pb Rivas MD.    Madeline Salcido.  Pb Rivas MD: The scribe's documentation has been prepared under my direction and personally reviewed by me in its entirety. I confirm that the note above accurately reflects all work, treatment, procedures, and medical decision making performed by me.

## 2018-06-18 NOTE — PROGRESS NOTES
This is a recent snapshot of the patient's [facility] medical record.  For current drug dose and complete information and questions, call [phone] or In Basket pool, [facility]v home infusion ([id number])  CSN Number:  [id number]

## 2018-06-19 ENCOUNTER — HOME INFUSION (PRE-WILLOW HOME INFUSION) (OUTPATIENT)
Dept: PHARMACY | Facility: CLINIC | Age: 48
End: 2018-06-19

## 2018-06-19 LAB — PREALB SERPL IA-MCNC: 20 MG/DL (ref 15–45)

## 2018-06-19 NOTE — PROGRESS NOTES
This is a recent snapshot of the patient's [facility] medical record.  For current drug dose and complete information and questions, call [phone] or In Basket pool, fv home infusion ([id number])  CSN Number:  [id number]

## 2018-06-20 ASSESSMENT — ENCOUNTER SYMPTOMS
LOSS OF CONSCIOUSNESS: 0
BLOATING: 1
SPUTUM PRODUCTION: 1
MUSCLE CRAMPS: 1
SINUS PAIN: 1
SINUS CONGESTION: 1
BOWEL INCONTINENCE: 1
WEIGHT GAIN: 0
HEMOPTYSIS: 0
LEG PAIN: 1
POOR WOUND HEALING: 0
EYE REDNESS: 1
POLYDIPSIA: 1
CHILLS: 1
COUGH DISTURBING SLEEP: 0
DIFFICULTY URINATING: 1
WEAKNESS: 1
JAUNDICE: 0
SPEECH CHANGE: 0
LIGHT-HEADEDNESS: 1
POLYPHAGIA: 0
RECTAL PAIN: 0
INCREASED ENERGY: 1
SLEEP DISTURBANCES DUE TO BREATHING: 1
ARTHRALGIAS: 1
EYE PAIN: 0
BACK PAIN: 1
BLOOD IN STOOL: 0
ORTHOPNEA: 1
DECREASED APPETITE: 1
CONSTIPATION: 0
MUSCLE WEAKNESS: 1
DIZZINESS: 1
FLANK PAIN: 0
HYPOTENSION: 1
SORE THROAT: 1
MEMORY LOSS: 1
EYE IRRITATION: 1
HEADACHES: 1
HEMATURIA: 1
WEIGHT LOSS: 1
NECK PAIN: 1
TINGLING: 1
DYSPNEA ON EXERTION: 1
NAIL CHANGES: 1
DOUBLE VISION: 0
TASTE DISTURBANCE: 1
WHEEZING: 0
STIFFNESS: 1
VOMITING: 1
DYSURIA: 1
ALTERED TEMPERATURE REGULATION: 1
ABDOMINAL PAIN: 1
SHORTNESS OF BREATH: 1
HEARTBURN: 1
FEVER: 1
POSTURAL DYSPNEA: 1
SKIN CHANGES: 0
TREMORS: 0
EXERCISE INTOLERANCE: 0
PARALYSIS: 0
TROUBLE SWALLOWING: 1
NUMBNESS: 1
JOINT SWELLING: 1
PALPITATIONS: 1
BRUISES/BLEEDS EASILY: 1
SWOLLEN GLANDS: 0
SYNCOPE: 0
DIARRHEA: 1
SEIZURES: 0
DISTURBANCES IN COORDINATION: 1
HYPERTENSION: 0
HALLUCINATIONS: 0
FATIGUE: 1
NIGHT SWEATS: 0
SNORES LOUDLY: 0
COUGH: 1
NAUSEA: 1
EYE WATERING: 1
HOARSE VOICE: 1
NECK MASS: 0
SMELL DISTURBANCE: 0
MYALGIAS: 1

## 2018-06-20 NOTE — PROGRESS NOTES
This is a recent snapshot of the patient's [facility] medical record.  For current drug dose and complete information and questions, call [phone] or In Basket pool, fv home infusion ([id number])  CSN Number:  [id number]

## 2018-06-21 ENCOUNTER — OFFICE VISIT (OUTPATIENT)
Dept: INFECTIOUS DISEASES | Facility: CLINIC | Age: 48
End: 2018-06-21
Attending: INTERNAL MEDICINE
Payer: COMMERCIAL

## 2018-06-21 ENCOUNTER — HOSPITAL ENCOUNTER (OUTPATIENT)
Facility: CLINIC | Age: 48
Setting detail: SPECIMEN
Discharge: HOME | End: 2018-06-21
Admitting: INTERNAL MEDICINE
Payer: COMMERCIAL

## 2018-06-21 VITALS
DIASTOLIC BLOOD PRESSURE: 60 MMHG | BODY MASS INDEX: 27.25 KG/M2 | SYSTOLIC BLOOD PRESSURE: 145 MMHG | WEIGHT: 195.4 LBS | HEART RATE: 62 BPM | OXYGEN SATURATION: 100 %

## 2018-06-21 DIAGNOSIS — T80.211D BLOODSTREAM INFECTION ASSOCIATED WITH CENTRAL VENOUS CATHETER, SUBSEQUENT ENCOUNTER: ICD-10-CM

## 2018-06-21 DIAGNOSIS — R78.81 ENTEROCOCCAL BACTEREMIA: ICD-10-CM

## 2018-06-21 DIAGNOSIS — B95.2 ENTEROCOCCAL BACTEREMIA: ICD-10-CM

## 2018-06-21 DIAGNOSIS — B95.2 ENTEROCOCCAL BACTEREMIA: Primary | ICD-10-CM

## 2018-06-21 DIAGNOSIS — R78.81 ENTEROCOCCAL BACTEREMIA: Primary | ICD-10-CM

## 2018-06-21 LAB
ALBUMIN SERPL-MCNC: 3.8 G/DL (ref 3.4–5)
ALP SERPL-CCNC: 89 U/L (ref 40–150)
ALT SERPL W P-5'-P-CCNC: 20 U/L (ref 0–70)
ANION GAP SERPL CALCULATED.3IONS-SCNC: 6 MMOL/L (ref 3–14)
AST SERPL W P-5'-P-CCNC: 16 U/L (ref 0–45)
BILIRUB SERPL-MCNC: 0.6 MG/DL (ref 0.2–1.3)
BUN SERPL-MCNC: 11 MG/DL (ref 7–30)
CALCIUM SERPL-MCNC: 8.7 MG/DL (ref 8.5–10.1)
CHLORIDE SERPL-SCNC: 107 MMOL/L (ref 94–109)
CO2 SERPL-SCNC: 30 MMOL/L (ref 20–32)
CREAT SERPL-MCNC: 0.7 MG/DL (ref 0.66–1.25)
CRP SERPL-MCNC: <2.9 MG/L (ref 0–8)
ERYTHROCYTE [DISTWIDTH] IN BLOOD BY AUTOMATED COUNT: 12.9 % (ref 10–15)
GFR SERPL CREATININE-BSD FRML MDRD: >90 ML/MIN/1.7M2
GLUCOSE SERPL-MCNC: 74 MG/DL (ref 70–99)
HCT VFR BLD AUTO: 41 % (ref 40–53)
HGB BLD-MCNC: 13.4 G/DL (ref 13.3–17.7)
MCH RBC QN AUTO: 31.2 PG (ref 26.5–33)
MCHC RBC AUTO-ENTMCNC: 32.7 G/DL (ref 31.5–36.5)
MCV RBC AUTO: 96 FL (ref 78–100)
PLATELET # BLD AUTO: 142 10E9/L (ref 150–450)
POTASSIUM SERPL-SCNC: 3.4 MMOL/L (ref 3.4–5.3)
PROT SERPL-MCNC: 7.2 G/DL (ref 6.8–8.8)
RBC # BLD AUTO: 4.29 10E12/L (ref 4.4–5.9)
SODIUM SERPL-SCNC: 143 MMOL/L (ref 133–144)
WBC # BLD AUTO: 6.2 10E9/L (ref 4–11)

## 2018-06-21 PROCEDURE — 80053 COMPREHEN METABOLIC PANEL: CPT | Performed by: INTERNAL MEDICINE

## 2018-06-21 PROCEDURE — 85027 COMPLETE CBC AUTOMATED: CPT | Performed by: INTERNAL MEDICINE

## 2018-06-21 PROCEDURE — G0463 HOSPITAL OUTPT CLINIC VISIT: HCPCS | Mod: ZF

## 2018-06-21 PROCEDURE — 87040 BLOOD CULTURE FOR BACTERIA: CPT | Performed by: INTERNAL MEDICINE

## 2018-06-21 PROCEDURE — 86140 C-REACTIVE PROTEIN: CPT | Performed by: INTERNAL MEDICINE

## 2018-06-21 PROCEDURE — 36415 COLL VENOUS BLD VENIPUNCTURE: CPT | Performed by: INTERNAL MEDICINE

## 2018-06-21 ASSESSMENT — PAIN SCALES - GENERAL: PAINLEVEL: NO PAIN (0)

## 2018-06-21 NOTE — NURSING NOTE
Chief Complaint   Patient presents with     Blood Draw     labs drawn from PICC line by RN     Blood culture drawn from purple lumen of PICC line.  Natalie Hassan RN

## 2018-06-21 NOTE — PROGRESS NOTES
[facility] - Progress Note  Dr. Gabbi Thompson, [facility], [facility], [address] [place] [address], Sixth Floor, [address]  [place] [address]  Patient:  Parker Acevedo Sr, Date of birth 1970, Medical record number [id number]  Date of Visit: Jun 21, 2018         Assessment and Recommendations:     Enterococcal Port Infection  Mr. Acevedo had an enterococcal port infection.  He has completed Vancomycin therapy and now has been off antibiotics for over a week.  He and his wife are concerned that he may have low grade fever.  Based on this, I will check 2 blood cultures - PICC and peripheral and check CBC, CMP and CRP.  At present I am not comfortable okaying the placement of a more permanent line. I will reassess based on Mr. Acevedo's clinical course over the next week and the results of the above labs.  Mr. Acevedo and his wife are in agreeement on this.     Gabbi Thompson MD  Division of Infectious Diseases and International Medicine  [phone]         History of Infectious Disease Illness:   Parker Acevedo Sr is a 47 year old man who occasionally see for line infections.  He is chronically dependent on TPN for short gut syndrome and he occasionally has suffered from port/line infections.  I recently saw him in the hospital and he was found to have enterococcal bacteremia.  At that time, his port was removed and he had a negative TTE (6/3/18). He completed a 2week course of Vancomycin.  He thinks that since he stopped that he may be having some low grade fevers.  He denies any changes in stool output.  No systemic rashes, but he has a mild area of skin irritation under his PICC dressing which he thinks is improving.  He denies chest pain or shortness of breath.  No finger or toe lesions.         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)     mild EF ~45% on rest 2/13/17, improves with stressing     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)      Chronic abdominal pain      Difficulty swallowing      Gastric ulcer, unspecified as acute or chronic, without mention of hemorrhage, perforation, or obstruction      Gastro-oesophageal reflux disease      Generalized weakness 1/30/2018     Head injury      Hiatal hernia      Other bladder disorder      Other chronic pain      Severe malnutrition (H)     TPN     Short gut syndrome      Tobacco abuse        Past Surgical History:   Procedure Laterality Date     AMPUTATION       APPENDECTOMY       BACK SURGERY  11/3/2014    curve in the spine     BIOPSY LYMPH NODE CERVICAL N/A 2/20/2015    Procedure: BIOPSY LYMPH NODE CERVICAL;  Surgeon: Baron Scanlon MD;  Location: [facility] OR     C GASTRIC BYPASS,OBESE<100CM SHAYLEE-EN-Y  2002    lost 300 pounds     CHOLECYSTECTOMY       DISCECTOMY, FUSION CERVICAL ANTERIOR ONE LEVEL, COMBINED N/A 2/15/2017    Procedure: COMBINED DISCECTOMY, FUSION CERVICAL ANTERIOR ONE LEVEL;  Surgeon: Darren Campos MD;  Location: [facility] OR     ENDOSCOPIC INSERTION TUBE GASTROSTOMY  9/9/2013    Procedure: ENDOSCOPIC INSERTION TUBE GASTROSTOMY;;  Surgeon: Francis Vyas MD;  [facility]     ENDOSCOPIC ULTRASOUND UPPER GASTROINTESTINAL TRACT (GI)  4/29/2011    Procedure:ENDOSCOPIC ULTRASOUND UPPER GASTROINTESTINAL TRACT (GI); Both Procedures done Conjointly; Surgeon:NEREIDA HOUSER; [facility]     ENDOSCOPIC ULTRASOUND UPPER GASTROINTESTINAL TRACT (GI)  9/9/2013    Procedure: ENDOSCOPIC ULTRASOUND UPPER GASTROINTESTINAL TRACT (GI);  Endoscopic Ultrasound Guide Gastrostomy Tube Placement  C-arm;  Surgeon: Noe Lizarraga MD;  [facility]     ENDOSCOPY  03/25/11    EGD, [facility]     ENDOSCOPY  08/04/09    Upper Endoscopy, [facility]      ENDOSCOPY  01/05/09    Upper Endoscopy, [facility]     ESOPHAGOSCOPY, GASTROSCOPY, DUODENOSCOPY (EGD), COMBINED  4/20/2011    Procedure:COMBINED ESOPHAGOSCOPY, GASTROSCOPY, DUODENOSCOPY (EGD); Surgeon:BLU VYAS; [facility]     ESOPHAGOSCOPY, GASTROSCOPY, DUODENOSCOPY (EGD), COMBINED  6/15/2011    Procedure:COMBINED ESOPHAGOSCOPY, GASTROSCOPY, DUODENOSCOPY (EGD); Surgeon:BLU VYAS; [facility]     ESOPHAGOSCOPY, GASTROSCOPY, DUODENOSCOPY (EGD), COMBINED  6/12/2013    Procedure: COMBINED ESOPHAGOSCOPY, GASTROSCOPY, DUODENOSCOPY (EGD);;  Surgeon: Blu Vyas MD;  Location:  GI     ESOPHAGOSCOPY, GASTROSCOPY, DUODENOSCOPY (EGD), COMBINED  11/22/2013    Procedure: COMBINED ESOPHAGOSCOPY, GASTROSCOPY, DUODENOSCOPY (EGD);;  Surgeon: Blu Vyas MD;  [facility]     ESOPHAGOSCOPY, GASTROSCOPY, DUODENOSCOPY (EGD), COMBINED  4/30/2014    Procedure: COMBINED ESOPHAGOSCOPY, GASTROSCOPY, DUODENOSCOPY (EGD);  Surgeon: Blu Vyas MD;  [facility]     ESOPHAGOSCOPY, GASTROSCOPY, DUODENOSCOPY (EGD), COMBINED N/A 2/20/2015    Procedure: COMBINED ESOPHAGOSCOPY, GASTROSCOPY, DUODENOSCOPY (EGD), BIOPSY SINGLE OR MULTIPLE;  Surgeon: Baron Scanlon MD;  Location:  OR     ESOPHAGOSCOPY, GASTROSCOPY, DUODENOSCOPY (EGD), COMBINED N/A 9/30/2015    Procedure: COMBINED ESOPHAGOSCOPY, GASTROSCOPY, DUODENOSCOPY (EGD);  Surgeon: Blu Vyas MD;  [facility]     GASTRECTOMY  6/22/2012    Procedure: GASTRECTOMY;  Open Approach, Excise Ulcers,Partial Gastrectomy, Esophagojejunostomy, Hiatal Hernia Repair, Extensive Lysis of Adhesions and Esaphagogastrodudenoscopy.;  Surgeon: Blu Vyas MD;  [facility]     GASTROJEJUNOSTOMY  08/26/09    Extensice enterolysis, partial resect. jejunum, part. resect gastric pouch, gastrojejunostomy anastomosis     HC ESOPH/GAS REFLUX TEST W NASAL IMPED ELECTRODE  8/5/2013    Procedure: ESOPHAGEAL IMPEDENCE  FUNCTION TEST 1 HOUR OR LESS;  Surgeon: Halie Lang MD;  [facility]     HEAD & NECK SURGERY  2/15/2017    C5-C6     HERNIA REPAIR  2006    Umbilical hernia     HERNIORRHAPHY HIATAL  6/22/2012    Procedure: HERNIORRHAPHY HIATAL;;  Surgeon: Francis Vyas MD;  [facility]     HERNIORRHAPHY INGUINAL  11/22/2013    Procedure: HERNIORRHAPHY INGUINAL;;  Surgeon: Francis Vyas MD;  [facility]     INSERT PORT VASCULAR ACCESS Right 12/19/2017    Procedure: INSERT PORT VASCULAR ACCESS;  Right Chest Port Placement ;  Surgeon: Lisandro Alejandro PA-C;  [facility]     LAPAROTOMY EXPLORATORY  11/22/2013    Procedure: LAPAROTOMY EXPLORATORY;  Exploratory Laparotomy, Upper Endoscopy, Left Inguinal Hernia Repair;  Surgeon: Francis Vyas MD;  [facility]     ORTHOPEDIC SURGERY       PICC INSERTION Right 03/16/2017    5fr DL BioFlo PICC, 42cm (3cm external) in the R medial brachial vein w/ tip in the SVC RA junction.     PICC INSERTION Left 09/23/2017    5fr DL BioFlo PICC, 45cm (1cm external) in the L basilic vein w/ tip in the SVC RA junction.     SHAYLEE EN Y BOWEL  2003     SOFT TISSUE SURGERY       SOFT TISSUE SURGERY       THORACIC SURGERY       TONSILLECTOMY             Family History:     Family History   Problem Relation Age of Onset     GASTROINTESTINAL DISEASE Mother      Crohns disease     Anxiety Disorder Mother      Thyroid Disease Mother      Grave's disease     Cancer Father      ear cancer-skin cancer/melanoma     Breast Cancer Maternal Grandmother      Anxiety Disorder Sister      Diabetes Maternal Uncle      Breast Cancer Other      Hypertension No family hx of      Hyperlipidemia No family hx of      Cerebrovascular Disease No family hx of      Prostate Cancer No family hx of      Depression No family hx of      Anesthesia Reaction No family hx of      Asthma No family hx of      Osteoperosis No family hx of      Genetic Disorder No family hx of      Obesity No  family hx of      Mental Illness No family hx of      Substance Abuse No family hx of            Social History:     Social History   Substance Use Topics     Smoking status: Light Tobacco Smoker     Packs/day: 0.10     Years: 3.00     Types: Cigarettes     Smokeless tobacco: Current User      Comment: I use an e cig every now and than     Alcohol use No      Comment: quit      Social History     Social History Narrative            Review of Systems:   Answers for HPI/ROS submitted by the patient on 6/20/2018   General Symptoms: Yes  Skin Symptoms: Yes  HENT Symptoms: Yes  EYE SYMPTOMS: Yes  HEART SYMPTOMS: Yes  LUNG SYMPTOMS: Yes  INTESTINAL SYMPTOMS: Yes  URINARY SYMPTOMS: Yes  REPRODUCTIVE SYMPTOMS: No  SKELETAL SYMPTOMS: Yes  BLOOD SYMPTOMS: Yes  NERVOUS SYSTEM SYMPTOMS: Yes  MENTAL HEALTH SYMPTOMS: No  Fever: Yes  Loss of appetite: Yes  Weight loss: Yes  Weight gain: No  Fatigue: Yes  Night sweats: No  Chills: Yes  Increased stress: No  Excessive hunger: No  Excessive thirst: Yes  Feeling hot or cold when others believe the temperature is normal: Yes  Loss of height: Yes  Post-operative complications: No  Surgical site pain: No  Hallucinations: No  Change in or Loss of Energy: Yes  Hyperactivity: No  Confusion: No  Changes in hair: No  Changes in moles/birth marks: No  Itching: Yes  Rashes: Yes  Changes in nails: Yes  Acne: No  Change in facial hair: No  Warts: No  Non-healing sores: No  Scarring: Yes  Flaking of skin: Yes  Color changes of hands/feet in cold : Yes  Sun sensitivity: Yes  Skin thickening: No  Ear pain: No  Ear discharge: No  Hearing loss: No  Tinnitus: Yes  Nosebleeds: Yes  Congestion: Yes  Sinus pain: Yes  Trouble swallowing: Yes   Voice hoarseness: Yes  Mouth sores: No  Sore throat: Yes  Tooth pain: No  Gum tenderness: Yes  Bleeding gums: No  Change in taste: Yes  Change in sense of smell: No  Dry mouth: Yes  Hearing aid used: No  Neck lump: No  Eye pain: No  Vision loss: No  Dry eyes:  Yes  Watery eyes: Yes  Eye bulging: No  Double vision: No  Flashing of lights: Yes  Spots: Yes  Floaters: Yes  Redness: Yes  Crossed eyes: No  Tunnel Vision: No  Yellowing of eyes: No  Eye irritation: Yes  Cough: Yes  Sputum or phlegm: Yes  Coughing up blood: No  Difficulty breating or shortness of breath: Yes  Snoring: No  Wheezing: No  Difficulty breathing on exertion: Yes  Nighttime Cough: No  Difficulty breathing when lying flat: Yes  Chest pain or pressure: Yes  Fast or irregular heartbeat: Yes  Pain in legs with walking: Yes  Trouble breathing while lying down: Yes  Fingers or toes appear blue: Yes  High blood pressure: No  Low blood pressure: Yes  Fainting: No  Murmurs: No  Pacemaker: No  Varicose veins: No  Edema or swelling: Yes  Wake up at night with shortness of breath: Yes  Light-headedness: Yes  Exercise intolerance: No  Heart burn or indigestion: Yes  Nausea: Yes  Vomiting: Yes  Abdominal pain: Yes  Bloating: Yes  Constipation: No  Diarrhea: Yes  Blood in stool: No  Black stools: No  Rectal or Anal pain: No  Fecal incontinence: Yes  Yellowing of skin or eyes: No  Vomit with blood: No  Change in stools: No  Trouble holding urine or incontinence: Yes  Pain or burning: Yes  Trouble starting or stopping: Yes  Increased frequency of urination: No  Blood in urine: Yes  Decreased frequency of urination: Yes  Frequent nighttime urination: Yes  Flank pain: No  Difficulty emptying bladder: Yes  Back pain: Yes  Muscle aches: Yes  Neck pain: Yes  Swollen joints: Yes  Joint pain: Yes  Bone pain: Yes  Muscle cramps: Yes  Muscle weakness: Yes  Joint stiffness: Yes  Bone fracture: Yes  Anemia: Yes  Swollen glands: No  Easy bleeding or bruising: Yes  Trouble with coordination: Yes  Dizziness or trouble with balance: Yes  Fainting or black-out spells: No  Memory loss: Yes  Headache: Yes  Seizures: No  Speech problems: No  Tingling: Yes  Tremor: No  Weakness: Yes  Difficulty walking: Yes  Paralysis: No  Numbness:  "Yes           Current Medications:     Current Outpatient Prescriptions   Medication Sig Dispense Refill     [START ON 7/6/2018] amphetamine-dextroamphetamine (ADDERALL) 20 MG per tablet Take 1 tablet (20 mg) by mouth daily 30 tablet 0     carvedilol (COREG) 12.5 MG tablet Take 12.5 mg by mouth 2 times daily (with meals)       cyanocobalamin (VITAMIN B12) 1000 MCG/ML injection Inject 1 mL (1,000 mcg) into the muscle every 30 days 1 mL 11     [facility] INFUSION MANAGED PATIENT Contact [facility] for patient specific medication information at [phone] on admission and discharge from the hospital.  Phones are answered 24 hours a day 7 days a week 365 days a year.    Providers - Choose \"CONTINUE HOME MED (no script)\" at discharge if patient treatment with home infusion will continue.       fentaNYL (DURAGESIC) 12 mcg/hr 72 hr patch Place 1 patch onto the skin every 48 hours . Remove old patch.  Fill on/after 6/22 to start on/after 6/24 15 patch 0     fentaNYL (DURAGESIC) 25 mcg/hr 72 hr patch Place 1 patch onto the skin every 48 hours remove old patch.  Release on/after 6/22/18 to start on/after 6/24/18 15 patch 0     naloxone (NARCAN) nasal spray Spray 1 spray (4 mg) into one nostril alternating nostrils as needed for opioid reversal (every 2-3 minutes until assistance arrives.) 0.2 mL 0     Needle, Disp, (BD DISP NEEDLES) 27G X 1/2\" MISC 1 Device every 30 days Use for cyanocobalamin injection once q 30 days. 3 each 4     ondansetron (ZOFRAN-ODT) 8 MG ODT tab Take 1 tablet (8 mg) by mouth every 8 hours as needed for nausea 90 tablet 3     order for DME Equipment being ordered: Bilateral knee high chronic venous insufficiency stockings--  mild-moderate pressures. 4 each 5     order for DME Injection Supplies for Vitamin B12: 3cc syringes w/ 27 gauge needles, 1/2 inch length 12 each 0     oxyCODONE (ROXICODONE) 5 MG/5ML solution Take 10-15 mLs (10-15 mg) by mouth every 4 hours as needed for moderate " to severe pain Max of 55 mg/day this month. Wean as able Fill on/after 6/8/18 not to start untill 6/11/18. Early fill for travel. 1650 mL 0     parenteral nutrition - PTA/DISCHARGE ORDER [facility] to resume previous home TPN but run x 24 hours the first day until labs are ok. Then cycle x 14 hours per previous home regimen. He has been off TPN x 5 days here.       sucralfate (CARAFATE) 1 GM/10ML suspension Take 10 mLs (1 g) by mouth 4 times daily (Patient taking differently: Take 1 g by mouth every 4 hours as needed (EPIGASTRIC PAIN, BILE BACKUP) ) 1200 mL 11     vitamin D (ERGOCALCIFEROL) 04782 UNIT capsule Take 1 capsule (50,000 Units) by mouth every 7 days (Patient taking differently: Take 50,000 Units by mouth every 7 days On sundays) 12 capsule 3     acetaminophen (TYLENOL) 500 MG tablet Take 1,000 mg by mouth every 6 hours as needed for fever       albuterol (PROAIR HFA/PROVENTIL HFA/VENTOLIN HFA) 108 (90 Base) MCG/ACT Inhaler Inhale 2 puffs into the lungs every 4 hours as needed for shortness of breath / dyspnea or wheezing 1 Inhaler 3     lidocaine (LIDODERM) 5 % Patch Place 1-2 patches onto the skin every 24 hours Wear for 12 hours, remove for 12 hours.  OK to cut to better fit to size. (Patient not taking: Reported on 6/21/2018) 60 patch 6     [DISCONTINUED] NO ACTIVE MEDICATIONS . 0 0            Immunization History:     Immunization History   Administered Date(s) Administered     DTAP (<7y) 01/05/1973, 02/09/1973, 03/23/1973, 10/26/1974, 05/04/1978     Influenza (IIV3) PF 09/01/2010, 10/06/2011, 10/23/2012     Influenza Vaccine IM 3yrs+ 4 Valent IIV4 10/13/2009, 10/06/2011, 10/23/2012, 09/25/2013, 10/14/2014, 10/09/2015, 09/20/2016, 09/21/2017     MMR 01/18/1974     Pneumo Conj 13-V (2010&after) 09/20/2016     Pneumococcal 23 valent 05/29/2008, 08/03/2015     Poliovirus, inactivated (IPV) 01/05/1973, 03/23/1973, 10/26/1974, 05/04/1978     TD (ADULT, 7+) 08/07/1997, 10/02/2008, 01/23/2009     TDAP Vaccine  (Adacel) 01/23/2009            Allergies:     Allergies   Allergen Reactions     Bactrim [Sulfamethoxazole W/Trimethoprim] Rash     Penicillins Anaphylaxis     Doxycycline Rash     Vancomycin Rash     Rash after receiving vancomycin 3/28/16 (red man's?). Tolerated with slower infusion and diphenhydramine premed.            Physical Exam:   Vital signs:  /60 (BP Location: Right arm)  Pulse 62  Wt 88.6 kg (195 lb 6.4 oz)  SpO2 100%  BMI 27.25 kg/m2    Physical Examination:  GENERAL:  well-developed, well-nourished, seated in no acute distress.   HEENT:  Head is normocephalic, atraumatic   EYES:  Eyes have anicteric sclerae without conjunctival injection   LUNGS:  Clear to auscultation bilateral.   CARDIOVASCULAR:  Regular rate and rhythm with no murmurs, gallops or rubs.  ABDOMEN:  Normal bowel sounds, soft, nontender.   SKIN:  No acute rashes. Little erythema under the adhesive at the PICC site.  Not at entry site.  Looks like it might be an irritation from the dressing.   No stigmata of endocarditis.   NEUROLOGIC:  Grossly nonfocal. Active x4 extremities         Laboratory Data:     Metabolic Studies   Sodium   Date Value Ref Range Status   06/18/2018 145 (H) 133 - 144 mmol/L Final   06/11/2018 142 133 - 144 mmol/L Final     Potassium   Date Value Ref Range Status   06/18/2018 3.3 (L) 3.4 - 5.3 mmol/L Final   06/11/2018 3.8 3.4 - 5.3 mmol/L Final     Chloride   Date Value Ref Range Status   06/18/2018 107 94 - 109 mmol/L Final   06/11/2018 106 94 - 109 mmol/L Final     Carbon Dioxide   Date Value Ref Range Status   06/18/2018 28 20 - 32 mmol/L Final   06/11/2018 28 20 - 32 mmol/L Final     Anion Gap   Date Value Ref Range Status   06/18/2018 10 3 - 14 mmol/L Final   06/11/2018 8 3 - 14 mmol/L Final     Urea Nitrogen   Date Value Ref Range Status   06/18/2018 7 7 - 30 mg/dL Final   06/11/2018 8 7 - 30 mg/dL Final     Creatinine   Date Value Ref Range Status   06/18/2018 0.65 (L) 0.66 - 1.25 mg/dL Final    06/14/2018 0.74 0.66 - 1.25 mg/dL Final     GFR Estimate   Date Value Ref Range Status   06/18/2018 >90 >60 mL/min/1.7m2 Final     Comment:     Non  GFR Calc   06/14/2018 >90 >60 mL/min/1.7m2 Final     Comment:     Non  GFR Calc     Glucose   Date Value Ref Range Status   06/18/2018 88 70 - 99 mg/dL Final   06/11/2018 74 70 - 99 mg/dL Final     Hemoglobin A1C   Date Value Ref Range Status   07/23/2013 4.9 4.3 - 6.0 % Final     Calcium   Date Value Ref Range Status   06/18/2018 8.3 (L) 8.5 - 10.1 mg/dL Final   06/11/2018 8.6 8.5 - 10.1 mg/dL Final     Phosphorus   Date Value Ref Range Status   06/18/2018 3.1 2.5 - 4.5 mg/dL Final   06/11/2018 3.6 2.5 - 4.5 mg/dL Final     Magnesium   Date Value Ref Range Status   06/18/2018 1.8 1.6 - 2.3 mg/dL Final   06/11/2018 2.0 1.6 - 2.3 mg/dL Final     Lactic Acid   Date Value Ref Range Status   06/02/2018 0.9 0.7 - 2.0 mmol/L Final   06/01/2018 0.9 0.7 - 2.0 mmol/L Final     CRP Inflammation   Date Value Ref Range Status   06/02/2018 30.0 (H) 0.0 - 8.0 mg/L Final   06/01/2018 26.0 (H) 0.0 - 8.0 mg/L Final   02/16/2018 8.2 (H) 0.0 - 8.0 mg/L Final   02/12/2018 <2.9 0.0 - 8.0 mg/L Final   01/23/2018 <2.9 0.0 - 8.0 mg/L Final   01/20/2018 5.4 0.0 - 8.0 mg/L Final       Inflammatory Markers   CRP Inflammation   Date Value Ref Range Status   06/02/2018 30.0 (H) 0.0 - 8.0 mg/L Final   06/01/2018 26.0 (H) 0.0 - 8.0 mg/L Final   02/16/2018 8.2 (H) 0.0 - 8.0 mg/L Final   02/12/2018 <2.9 0.0 - 8.0 mg/L Final   01/23/2018 <2.9 0.0 - 8.0 mg/L Final   01/20/2018 5.4 0.0 - 8.0 mg/L Final       Hepatic Studies    Bilirubin Total   Date Value Ref Range Status   06/18/2018 0.5 0.2 - 1.3 mg/dL Final   06/11/2018 0.5 0.2 - 1.3 mg/dL Final     Alkaline Phosphatase   Date Value Ref Range Status   06/18/2018 85 40 - 150 U/L Final   06/11/2018 85 40 - 150 U/L Final     Albumin   Date Value Ref Range Status   06/18/2018 3.4 3.4 - 5.0 g/dL Final   06/11/2018 3.5 3.4 -  5.0 g/dL Final     AST   Date Value Ref Range Status   06/18/2018 12 0 - 45 U/L Final   06/11/2018 11 0 - 45 U/L Final     ALT   Date Value Ref Range Status   06/18/2018 17 0 - 70 U/L Final   06/11/2018 17 0 - 70 U/L Final       Hematology Studies      WBC   Date Value Ref Range Status   06/18/2018 5.5 4.0 - 11.0 10e9/L Final   06/11/2018 7.4 4.0 - 11.0 10e9/L Final     Absolute Neutrophil   Date Value Ref Range Status   06/18/2018 2.8 1.6 - 8.3 10e9/L Final   06/11/2018 3.9 1.6 - 8.3 10e9/L Final     Absolute Lymphocytes   Date Value Ref Range Status   06/18/2018 1.8 0.8 - 5.3 10e9/L Final   06/11/2018 2.1 0.8 - 5.3 10e9/L Final     Absolute Monocytes   Date Value Ref Range Status   06/18/2018 0.7 0.0 - 1.3 10e9/L Final   06/11/2018 0.9 0.0 - 1.3 10e9/L Final     Absolute Eosinophils   Date Value Ref Range Status   06/04/2018 0.3 0.0 - 0.7 10e9/L Final   06/01/2018 0.1 0.0 - 0.7 10e9/L Final     Hemoglobin   Date Value Ref Range Status   06/18/2018 12.2 (L) 13.3 - 17.7 g/dL Final   06/11/2018 12.5 (L) 13.3 - 17.7 g/dL Final     Hematocrit   Date Value Ref Range Status   06/18/2018 38.1 (L) 40.0 - 53.0 % Final   06/11/2018 37.8 (L) 40.0 - 53.0 % Final     Platelet Count   Date Value Ref Range Status   06/18/2018 129 (L) 150 - 450 10e9/L Final   06/11/2018 190 150 - 450 10e9/L Final     Imaging:  Recent Results (from the past 744 hour(s))   XR Chest 2 Views    Narrative    History: Chest pain.    COMPARISON: Two-view chest 5/3/2018.    FINDINGS: Right-sided subclavian or IJ catheter again noted with  external ports. Metallic device again noted over the C7 vertebral body  at the level of the top of the trachea. No focal infiltrates,  pneumothorax or pleural effusion. A proximally 4 cm rounded opacity  along the spine at the level of the left hemidiaphragm and behind the  heart is likely a small hiatal hernia. The cardiac silhouette and  pulmonary vasculature are unchanged and within normal limits. Mild  anterior  wedging of lower thoracic vertebral bodies is stable along  with degenerative changes about the mid and lower thoracic spine. No  acute bony abnormality about the thorax.      Impression    IMPRESSION: No acute cardiopulmonary abnormality. If the patient is  immunocompromised and there is concern for infection, airspace  infection can be quite subtle in immunocompromised patients and CT  would be appropriate.    JANEL TORRES MD   IR CVC Tunnel Removal Right    Narrative    PRE-OPERATIVE DIAGNOSIS:  Bacteremia    POST-OPERATIVE DIAGNOSIS:  Same    PROCEDURE:  Removal of tunneled central venous catheter    Impression    IMPRESSION:  Completed removal of right single lumen tunneled central  venous catheter.  There were no complications. Catheter tip sent to  lab for culture as requested.    ----------    CLINICAL HISTORY:  The patient has a right tunneled central venous  catheter. He now with bacteremia and request for line removal with tip  of catheter sent to lab for culture. Patient states he was told  dictated on sterile dressing changes after the sutures were removed  from his catheter. We discussed that the suture can stay in place  around the catheter to keep it secured and to continue doing sterile  dressing changes should he have another line.    PERFORMED BY:  Linda Schaffer PA-C    MEDICATIONS:  A 10:1 volume mixture of 1% lidocaine without  epinephrine buffered with 8.4% bicarbonate solution was available for  local anesthesia.  No intravenous medications for sedation were  utilized.    DESCRIPTION:  Physical examination demonstrated no erythema,  tenderness, fluctuance, or discharge at the catheter exit site or  along the tract.  The catheter and its entry site into the skin was  prepped and draped in usual sterile fashion.     Lidocaine mixture and blunt dissection were used around the catheter  and subcutaneous cuff.     Using steady gentle traction, the catheter and cuff were freed from  the  subcutaneous tissue, and the entire catheter was removed without  difficulty.  Compression was applied over the venipuncture site, as  well as along the tract, until good hemostasis was achieved.  A  sterile dressing was applied to the skin at the exit site.    COMPLICATIONS:  No immediate concerns; the patient remained stable  throughout the procedure and tolerated it well.    ESTIMATED BLOOD LOSS:  Minimal    SPECIMENS:  Catheter removed per above and sent to lab for analysis as  requested    TIME:  A total of 15 minutes was spent with the patient. Greater than  50% of the time was spent in counseling, education, and coordination  of care.    --------    INSTRUCTIONS GIVEN TO PATIENT:  Keep site clean, dry, and covered for  72 hours.  Change the dressing if it becomes soiled, wet, or blood  soaked.  After 72 hours the dressing may be removed and the site may  be left uncovered and may get wet if the site has sealed.  If the site  has not sealed, keep it covered and dry with daily dressing changes  until sealed.  Monitor the neck over the collar bone for swelling and  the chest exit site for bleeding or infection as instructed.   Telephone Interventional Radiology daytime [phone], or [facility]   after hours [phone] (and ask for IR on-call), if  problems or concerns develop.  If the site bleeds, sit upright and  hold pressure on the site and above the collar bone for 10 minutes.   If it continues to bleed, continue holding pressure and telephone  Interventional Radiology at the number(s) above.    [facility]   XR Chest 1 View    Narrative    Exam: XR CHEST 1 VW, 6/6/2018 9:59 AM    Indication: RN placed PICC - verify tip placement;     Comparison: Radiograph of the chest 6/1/2018    Findings:   PA view of the chest. Interval removal of the right IJ central venous  catheter. New right PICC with the tip projecting over the low SVC.  Cardiomediastinal silhouette is stable. Pulmonary vascular  structures  distinct. No new focal airspace opacity. No pneumothorax or pleural  effusion. Upper abdomen is unremarkable.      Impression    Impression: New right PICC with the tip projecting over the low SVC.    I have personally reviewed the examination and initial interpretation  and I agree with the findings.    JAVIER BURNHAM MD ([place])

## 2018-06-21 NOTE — LETTER
6/21/2018      RE: Parker Acevedo Sr  [address]  [facility] [address]       [facility] - Progress Note  Dr. Gabbi Thompson, [facility], [facility], [address] [place] [address], Sixth Floor, [address]  [place] [address]  Patient:  Parker Acevedo Sr, Date of birth 1970, Medical record number [id number]  Date of Visit: Jun 21, 2018         Assessment and Recommendations:     Enterococcal Port Infection  Mr. Acevedo had an enterococcal port infection.  He has completed Vancomycin therapy and now has been off antibiotics for over a week.  He and his wife are concerned that he may have low grade fever.  Based on this, I will check 2 blood cultures - PICC and peripheral and check CBC, CMP and CRP.  At present I am not comfortable okaying the placement of a more permanent line. I will reassess based on Mr. Acevedo's clinical course over the next week and the results of the above labs.  Mr. Acevedo and his wife are in agreeement on this.     Gabbi Thompson MD  Division of Infectious Diseases and International Medicine  [phone]         History of Infectious Disease Illness:   Parker Acevedo Sr is a 47 year old man who occasionally see for line infections.  He is chronically dependent on TPN for short gut syndrome and he occasionally has suffered from port/line infections.  I recently saw him in the hospital and he was found to have enterococcal bacteremia.  At that time, his port was removed and he had a negative TTE (6/3/18). He completed a 2week course of Vancomycin.  He thinks that since he stopped that he may be having some low grade fevers.  He denies any changes in stool output.  No systemic rashes, but he has a mild area of skin irritation under his PICC dressing which he thinks is improving.  He denies chest pain or shortness of breath.  No finger or toe lesions.          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)      Chronic abdominal pain      Difficulty swallowing      Gastric ulcer, unspecified as acute or chronic, without mention of hemorrhage, perforation, or obstruction      Gastro-oesophageal reflux disease      Generalized weakness 1/30/2018     Head injury      Hiatal hernia      Other bladder disorder      Other chronic pain      Severe malnutrition (H)     TPN     Short gut syndrome      Tobacco abuse        Past Surgical History:   Procedure Laterality Date     AMPUTATION       APPENDECTOMY       BACK SURGERY  11/3/2014    curve in the spine     BIOPSY LYMPH NODE CERVICAL N/A 2/20/2015    Procedure: BIOPSY LYMPH NODE CERVICAL;  Surgeon: Baron Scanlon MD;  Location: [facility] OR     C GASTRIC BYPASS,OBESE<100CM SHAYLEE-EN-Y  2002    lost 300 pounds     CHOLECYSTECTOMY       DISCECTOMY, FUSION CERVICAL ANTERIOR ONE LEVEL, COMBINED N/A 2/15/2017    Procedure: COMBINED DISCECTOMY, FUSION CERVICAL ANTERIOR ONE LEVEL;  Surgeon: Darren Campos MD;  Location: [facility] OR     ENDOSCOPIC INSERTION TUBE GASTROSTOMY  9/9/2013    Procedure: ENDOSCOPIC INSERTION TUBE GASTROSTOMY;;  Surgeon: Francis Vyas MD;  [facility] OR     ENDOSCOPIC ULTRASOUND UPPER GASTROINTESTINAL TRACT (GI)  4/29/2011    Procedure:ENDOSCOPIC ULTRASOUND UPPER GASTROINTESTINAL TRACT (GI); Both Procedures done Conjointly; Surgeon:NEREIDA HOUSER; Location:[facility] OR     ENDOSCOPIC ULTRASOUND UPPER GASTROINTESTINAL TRACT (GI)  9/9/2013    Procedure: ENDOSCOPIC ULTRASOUND UPPER GASTROINTESTINAL TRACT (GI);  Endoscopic Ultrasound Guide Gastrostomy Tube Placement  C-arm;  Surgeon: Noe Lizarraga MD;  [facility] OR     ENDOSCOPY  03/25/11    [place], [place]  [facility]     ENDOSCOPY  08/04/09    [place] [place] [facility]     ENDOSCOPY  01/05/09    [place] [facility]     ESOPHAGOSCOPY, GASTROSCOPY, DUODENOSCOPY (EGD), COMBINED  4/20/2011    Procedure:COMBINED ESOPHAGOSCOPY, GASTROSCOPY, DUODENOSCOPY (EGD); Surgeon:BLU VYAS; Location: GI     ESOPHAGOSCOPY, GASTROSCOPY, DUODENOSCOPY (EGD), COMBINED  6/15/2011    Procedure:COMBINED ESOPHAGOSCOPY, GASTROSCOPY, DUODENOSCOPY (EGD); Surgeon:BLU VYAS; Location: GI     ESOPHAGOSCOPY, GASTROSCOPY, DUODENOSCOPY (EGD), COMBINED  6/12/2013    Procedure: COMBINED ESOPHAGOSCOPY, GASTROSCOPY, DUODENOSCOPY (EGD);;  Surgeon: Blu Vyas MD;  Location:  GI     ESOPHAGOSCOPY, GASTROSCOPY, DUODENOSCOPY (EGD), COMBINED  11/22/2013    Procedure: COMBINED ESOPHAGOSCOPY, GASTROSCOPY, DUODENOSCOPY (EGD);;  Surgeon: Blu Vyas MD;  Location:  OR     ESOPHAGOSCOPY, GASTROSCOPY, DUODENOSCOPY (EGD), COMBINED  4/30/2014    Procedure: COMBINED ESOPHAGOSCOPY, GASTROSCOPY, DUODENOSCOPY (EGD);  Surgeon: Blu Vyas MD;  Location:  GI     ESOPHAGOSCOPY, GASTROSCOPY, DUODENOSCOPY (EGD), COMBINED N/A 2/20/2015    Procedure: COMBINED ESOPHAGOSCOPY, GASTROSCOPY, DUODENOSCOPY (EGD), BIOPSY SINGLE OR MULTIPLE;  Surgeon: Baron Scanlon MD;  Location:  OR     ESOPHAGOSCOPY, GASTROSCOPY, DUODENOSCOPY (EGD), COMBINED N/A 9/30/2015    Procedure: COMBINED ESOPHAGOSCOPY, GASTROSCOPY, DUODENOSCOPY (EGD);  Surgeon: Blu Vyas MD;  Location:  GI     GASTRECTOMY  6/22/2012    Procedure: GASTRECTOMY;  Open Approach, Excise Ulcers,Partial Gastrectomy, Esophagojejunostomy, Hiatal Hernia Repair, Extensive Lysis of Adhesions and Esaphagogastrodudenoscopy.;  Surgeon: Blu Vyas MD;  [facility]     GASTROJEJUNOSTOMY  08/26/09    Extensice enterolysis, partial resect. jejunum, part. resect gastric pouch, gastrojejunostomy anastomosis     HC  ESOPH/GAS REFLUX TEST W NASAL IMPED ELECTRODE  8/5/2013    Procedure: ESOPHAGEAL IMPEDENCE FUNCTION TEST 1 HOUR OR LESS;  Surgeon: Halie Lang MD;  Location: [facility] GI     HEAD & NECK SURGERY  2/15/2017    C5-C6     HERNIA REPAIR  2006    Umbilical hernia     HERNIORRHAPHY HIATAL  6/22/2012    Procedure: HERNIORRHAPHY HIATAL;;  Surgeon: Francis Vyas MD;  [facility]     HERNIORRHAPHY INGUINAL  11/22/2013    Procedure: HERNIORRHAPHY INGUINAL;;  Surgeon: Francis Vyas MD;  [facility] OR     INSERT PORT VASCULAR ACCESS Right 12/19/2017    Procedure: INSERT PORT VASCULAR ACCESS;  Right Chest Port Placement ;  Surgeon: Lisandro Alejandro PA-C;  [facility] OR     LAPAROTOMY EXPLORATORY  11/22/2013    Procedure: LAPAROTOMY EXPLORATORY;  Exploratory Laparotomy, Upper Endoscopy, Left Inguinal Hernia Repair;  Surgeon: Francis Vyas MD;  [facility]: [facility] OR     ORTHOPEDIC SURGERY       PICC INSERTION Right 03/16/2017    5fr DL BioFlo PICC, 42cm (3cm external) in the R medial brachial vein w/ tip in the SVC RA junction.     PICC INSERTION Left 09/23/2017    5fr DL BioFlo PICC, 45cm (1cm external) in the L basilic vein w/ tip in the SVC RA junction.     SHAYLEE EN Y BOWEL  2003     SOFT TISSUE SURGERY       SOFT TISSUE SURGERY       THORACIC SURGERY       TONSILLECTOMY             Family History:     Family History   Problem Relation Age of Onset     GASTROINTESTINAL DISEASE Mother      Crohns disease     Anxiety Disorder Mother      Thyroid Disease Mother      Grave's disease     Cancer Father      ear cancer-skin cancer/melanoma     Breast Cancer Maternal Grandmother      Anxiety Disorder Sister      Diabetes Maternal Uncle      Breast Cancer Other      Hypertension No family hx of      Hyperlipidemia No family hx of      Cerebrovascular Disease No family hx of      Prostate Cancer No family hx of      Depression No family hx of      Anesthesia Reaction No family hx of      Asthma No family hx  "of      Osteoperosis No family hx of      Genetic Disorder No family hx of      Obesity No family hx of      Mental Illness No family hx of      Substance Abuse No family hx of            Social History:     Social History   Substance Use Topics     Smoking status: Light Tobacco Smoker     Packs/day: 0.10     Years: 3.00     Types: Cigarettes     Smokeless tobacco: Current User      Comment: I use an e cig every now and than     Alcohol use No      Comment: quit      Social History     Social History Narrative            Current Medications:     Current Outpatient Prescriptions   Medication Sig Dispense Refill     [START ON 7/6/2018] amphetamine-dextroamphetamine (ADDERALL) 20 MG per tablet Take 1 tablet (20 mg) by mouth daily 30 tablet 0     carvedilol (COREG) 12.5 MG tablet Take 12.5 mg by mouth 2 times daily (with meals)       cyanocobalamin (VITAMIN B12) 1000 MCG/ML injection Inject 1 mL (1,000 mcg) into the muscle every 30 days 1 mL 11     [facility] MANAGED PATIENT Contact [facility] for patient specific medication information at [phone] on admission and discharge from the hospital.  Phones are answered 24 hours a day 7 days a week 365 days a year.    Providers - Choose \"CONTINUE HOME MED (no script)\" at discharge if patient treatment with home infusion will continue.       fentaNYL (DURAGESIC) 12 mcg/hr 72 hr patch Place 1 patch onto the skin every 48 hours . Remove old patch.  Fill on/after 6/22 to start on/after 6/24 15 patch 0     fentaNYL (DURAGESIC) 25 mcg/hr 72 hr patch Place 1 patch onto the skin every 48 hours remove old patch.  Release on/after 6/22/18 to start on/after 6/24/18 15 patch 0     naloxone (NARCAN) nasal spray Spray 1 spray (4 mg) into one nostril alternating nostrils as needed for opioid reversal (every 2-3 minutes until assistance arrives.) 0.2 mL 0     Needle, Disp, (BD DISP NEEDLES) 27G X 1/2\" MISC 1 Device every 30 days Use for cyanocobalamin injection " once q 30 days. 3 each 4     ondansetron (ZOFRAN-ODT) 8 MG ODT tab Take 1 tablet (8 mg) by mouth every 8 hours as needed for nausea 90 tablet 3     order for DME Equipment being ordered: Bilateral knee high chronic venous insufficiency stockings--  mild-moderate pressures. 4 each 5     order for DME Injection Supplies for Vitamin B12: 3cc syringes w/ 27 gauge needles, 1/2 inch length 12 each 0     oxyCODONE (ROXICODONE) 5 MG/5ML solution Take 10-15 mLs (10-15 mg) by mouth every 4 hours as needed for moderate to severe pain Max of 55 mg/day this month. Wean as able Fill on/after 6/8/18 not to start untill 6/11/18. Early fill for travel. 1650 mL 0     parenteral nutrition - PTA/DISCHARGE ORDER [facility] to resume previous home TPN but run x 24 hours the first day until labs are ok. Then cycle x 14 hours per previous home regimen. He has been off TPN x 5 days here.       sucralfate (CARAFATE) 1 GM/10ML suspension Take 10 mLs (1 g) by mouth 4 times daily (Patient taking differently: Take 1 g by mouth every 4 hours as needed (EPIGASTRIC PAIN, BILE BACKUP) ) 1200 mL 11     vitamin D (ERGOCALCIFEROL) 13193 UNIT capsule Take 1 capsule (50,000 Units) by mouth every 7 days (Patient taking differently: Take 50,000 Units by mouth every 7 days On sundays) 12 capsule 3     acetaminophen (TYLENOL) 500 MG tablet Take 1,000 mg by mouth every 6 hours as needed for fever       albuterol (PROAIR HFA/PROVENTIL HFA/VENTOLIN HFA) 108 (90 Base) MCG/ACT Inhaler Inhale 2 puffs into the lungs every 4 hours as needed for shortness of breath / dyspnea or wheezing 1 Inhaler 3     lidocaine (LIDODERM) 5 % Patch Place 1-2 patches onto the skin every 24 hours Wear for 12 hours, remove for 12 hours.  OK to cut to better fit to size. (Patient not taking: Reported on 6/21/2018) 60 patch 6     [DISCONTINUED] NO ACTIVE MEDICATIONS . 0 0            Immunization History:     Immunization History   Administered Date(s) Administered     DTAP (<7y) 01/05/1973,  02/09/1973, 03/23/1973, 10/26/1974, 05/04/1978     Influenza (IIV3) PF 09/01/2010, 10/06/2011, 10/23/2012     Influenza Vaccine IM 3yrs+ 4 Valent IIV4 10/13/2009, 10/06/2011, 10/23/2012, 09/25/2013, 10/14/2014, 10/09/2015, 09/20/2016, 09/21/2017     MMR 01/18/1974     Pneumo Conj 13-V (2010&after) 09/20/2016     Pneumococcal 23 valent 05/29/2008, 08/03/2015     Poliovirus, inactivated (IPV) 01/05/1973, 03/23/1973, 10/26/1974, 05/04/1978     TD (ADULT, 7+) 08/07/1997, 10/02/2008, 01/23/2009     TDAP Vaccine (Adacel) 01/23/2009            Allergies:     Allergies   Allergen Reactions     Bactrim [Sulfamethoxazole W/Trimethoprim] Rash     Penicillins Anaphylaxis     Doxycycline Rash     Vancomycin Rash     Rash after receiving vancomycin 3/28/16 (red man's?). Tolerated with slower infusion and diphenhydramine premed.            Physical Exam:   Vital signs:  /60 (BP Location: Right arm)  Pulse 62  Wt 88.6 kg (195 lb 6.4 oz)  SpO2 100%  BMI 27.25 kg/m2    Physical Examination:  GENERAL:  well-developed, well-nourished, seated in no acute distress.   HEENT:  Head is normocephalic, atraumatic   EYES:  Eyes have anicteric sclerae without conjunctival injection   LUNGS:  Clear to auscultation bilateral.   CARDIOVASCULAR:  Regular rate and rhythm with no murmurs, gallops or rubs.  ABDOMEN:  Normal bowel sounds, soft, nontender.   SKIN:  No acute rashes. Little erythema under the adhesive at the PICC site.  Not at entry site.  Looks like it might be an irritation from the dressing.   No stigmata of endocarditis.   NEUROLOGIC:  Grossly nonfocal. Active x4 extremities         Laboratory Data:     Metabolic Studies   Sodium   Date Value Ref Range Status   06/18/2018 145 (H) 133 - 144 mmol/L Final   06/11/2018 142 133 - 144 mmol/L Final     Potassium   Date Value Ref Range Status   06/18/2018 3.3 (L) 3.4 - 5.3 mmol/L Final   06/11/2018 3.8 3.4 - 5.3 mmol/L Final     Chloride   Date Value Ref Range Status   06/18/2018  107 94 - 109 mmol/L Final   06/11/2018 106 94 - 109 mmol/L Final     Carbon Dioxide   Date Value Ref Range Status   06/18/2018 28 20 - 32 mmol/L Final   06/11/2018 28 20 - 32 mmol/L Final     Anion Gap   Date Value Ref Range Status   06/18/2018 10 3 - 14 mmol/L Final   06/11/2018 8 3 - 14 mmol/L Final     Urea Nitrogen   Date Value Ref Range Status   06/18/2018 7 7 - 30 mg/dL Final   06/11/2018 8 7 - 30 mg/dL Final     Creatinine   Date Value Ref Range Status   06/18/2018 0.65 (L) 0.66 - 1.25 mg/dL Final   06/14/2018 0.74 0.66 - 1.25 mg/dL Final     GFR Estimate   Date Value Ref Range Status   06/18/2018 >90 >60 mL/min/1.7m2 Final     Comment:     Non  GFR Calc   06/14/2018 >90 >60 mL/min/1.7m2 Final     Comment:     Non  GFR Calc     Glucose   Date Value Ref Range Status   06/18/2018 88 70 - 99 mg/dL Final   06/11/2018 74 70 - 99 mg/dL Final     Hemoglobin A1C   Date Value Ref Range Status   07/23/2013 4.9 4.3 - 6.0 % Final     Calcium   Date Value Ref Range Status   06/18/2018 8.3 (L) 8.5 - 10.1 mg/dL Final   06/11/2018 8.6 8.5 - 10.1 mg/dL Final     Phosphorus   Date Value Ref Range Status   06/18/2018 3.1 2.5 - 4.5 mg/dL Final   06/11/2018 3.6 2.5 - 4.5 mg/dL Final     Magnesium   Date Value Ref Range Status   06/18/2018 1.8 1.6 - 2.3 mg/dL Final   06/11/2018 2.0 1.6 - 2.3 mg/dL Final     Lactic Acid   Date Value Ref Range Status   06/02/2018 0.9 0.7 - 2.0 mmol/L Final   06/01/2018 0.9 0.7 - 2.0 mmol/L Final     CRP Inflammation   Date Value Ref Range Status   06/02/2018 30.0 (H) 0.0 - 8.0 mg/L Final   06/01/2018 26.0 (H) 0.0 - 8.0 mg/L Final   02/16/2018 8.2 (H) 0.0 - 8.0 mg/L Final   02/12/2018 <2.9 0.0 - 8.0 mg/L Final   01/23/2018 <2.9 0.0 - 8.0 mg/L Final   01/20/2018 5.4 0.0 - 8.0 mg/L Final       Inflammatory Markers   CRP Inflammation   Date Value Ref Range Status   06/02/2018 30.0 (H) 0.0 - 8.0 mg/L Final   06/01/2018 26.0 (H) 0.0 - 8.0 mg/L Final   02/16/2018 8.2 (H) 0.0  - 8.0 mg/L Final   02/12/2018 <2.9 0.0 - 8.0 mg/L Final   01/23/2018 <2.9 0.0 - 8.0 mg/L Final   01/20/2018 5.4 0.0 - 8.0 mg/L Final       Hepatic Studies    Bilirubin Total   Date Value Ref Range Status   06/18/2018 0.5 0.2 - 1.3 mg/dL Final   06/11/2018 0.5 0.2 - 1.3 mg/dL Final     Alkaline Phosphatase   Date Value Ref Range Status   06/18/2018 85 40 - 150 U/L Final   06/11/2018 85 40 - 150 U/L Final     Albumin   Date Value Ref Range Status   06/18/2018 3.4 3.4 - 5.0 g/dL Final   06/11/2018 3.5 3.4 - 5.0 g/dL Final     AST   Date Value Ref Range Status   06/18/2018 12 0 - 45 U/L Final   06/11/2018 11 0 - 45 U/L Final     ALT   Date Value Ref Range Status   06/18/2018 17 0 - 70 U/L Final   06/11/2018 17 0 - 70 U/L Final       Hematology Studies      WBC   Date Value Ref Range Status   06/18/2018 5.5 4.0 - 11.0 10e9/L Final   06/11/2018 7.4 4.0 - 11.0 10e9/L Final     Absolute Neutrophil   Date Value Ref Range Status   06/18/2018 2.8 1.6 - 8.3 10e9/L Final   06/11/2018 3.9 1.6 - 8.3 10e9/L Final     Absolute Lymphocytes   Date Value Ref Range Status   06/18/2018 1.8 0.8 - 5.3 10e9/L Final   06/11/2018 2.1 0.8 - 5.3 10e9/L Final     Absolute Monocytes   Date Value Ref Range Status   06/18/2018 0.7 0.0 - 1.3 10e9/L Final   06/11/2018 0.9 0.0 - 1.3 10e9/L Final     Absolute Eosinophils   Date Value Ref Range Status   06/04/2018 0.3 0.0 - 0.7 10e9/L Final   06/01/2018 0.1 0.0 - 0.7 10e9/L Final     Hemoglobin   Date Value Ref Range Status   06/18/2018 12.2 (L) 13.3 - 17.7 g/dL Final   06/11/2018 12.5 (L) 13.3 - 17.7 g/dL Final     Hematocrit   Date Value Ref Range Status   06/18/2018 38.1 (L) 40.0 - 53.0 % Final   06/11/2018 37.8 (L) 40.0 - 53.0 % Final     Platelet Count   Date Value Ref Range Status   06/18/2018 129 (L) 150 - 450 10e9/L Final   06/11/2018 190 150 - 450 10e9/L Final     Imaging:  Recent Results (from the past 744 hour(s))   XR Chest 2 Views    Narrative    History: Chest pain.    COMPARISON: Two-view  chest 5/3/2018.    FINDINGS: Right-sided subclavian or IJ catheter again noted with  external ports. Metallic device again noted over the C7 vertebral body  at the level of the top of the trachea. No focal infiltrates,  pneumothorax or pleural effusion. A proximally 4 cm rounded opacity  along the spine at the level of the left hemidiaphragm and behind the  heart is likely a small hiatal hernia. The cardiac silhouette and  pulmonary vasculature are unchanged and within normal limits. Mild  anterior wedging of lower thoracic vertebral bodies is stable along  with degenerative changes about the mid and lower thoracic spine. No  acute bony abnormality about the thorax.      Impression    IMPRESSION: No acute cardiopulmonary abnormality. If the patient is  immunocompromised and there is concern for infection, airspace  infection can be quite subtle in immunocompromised patients and CT  would be appropriate.    JANEL TORRES MD   IR CVC Tunnel Removal Right    Narrative    PRE-OPERATIVE DIAGNOSIS:  Bacteremia    POST-OPERATIVE DIAGNOSIS:  Same    PROCEDURE:  Removal of tunneled central venous catheter    Impression    IMPRESSION:  Completed removal of right single lumen tunneled central  venous catheter.  There were no complications. Catheter tip sent to  lab for culture as requested.    ----------    CLINICAL HISTORY:  The patient has a right tunneled central venous  catheter. He now with bacteremia and request for line removal with tip  of catheter sent to lab for culture. Patient states he was told  dictated on sterile dressing changes after the sutures were removed  from his catheter. We discussed that the suture can stay in place  around the catheter to keep it secured and to continue doing sterile  dressing changes should he have another line.    PERFORMED BY:  Linda Schaffer PA-C    MEDICATIONS:  A 10:1 volume mixture of 1% lidocaine without  epinephrine buffered with 8.4% bicarbonate solution was available  for  local anesthesia.  No intravenous medications for sedation were  utilized.    DESCRIPTION:  Physical examination demonstrated no erythema,  tenderness, fluctuance, or discharge at the catheter exit site or  along the tract.  The catheter and its entry site into the skin was  prepped and draped in usual sterile fashion.     Lidocaine mixture and blunt dissection were used around the catheter  and subcutaneous cuff.     Using steady gentle traction, the catheter and cuff were freed from  the subcutaneous tissue, and the entire catheter was removed without  difficulty.  Compression was applied over the venipuncture site, as  well as along the tract, until good hemostasis was achieved.  A  sterile dressing was applied to the skin at the exit site.    COMPLICATIONS:  No immediate concerns; the patient remained stable  throughout the procedure and tolerated it well.    ESTIMATED BLOOD LOSS:  Minimal    SPECIMENS:  Catheter removed per above and sent to lab for analysis as  requested    TIME:  A total of 15 minutes was spent with the patient. Greater than  50% of the time was spent in counseling, education, and coordination  of care.    --------    INSTRUCTIONS GIVEN TO PATIENT:  Keep site clean, dry, and covered for  72 hours.  Change the dressing if it becomes soiled, wet, or blood  soaked.  After 72 hours the dressing may be removed and the site may  be left uncovered and may get wet if the site has sealed.  If the site  has not sealed, keep it covered and dry with daily dressing changes  until sealed.  Monitor the neck over the collar bone for swelling and  the chest exit site for bleeding or infection as instructed.   Telephone Interventional Radiology daytime [phone], or [facility]   after hours [phone] (and ask for IR on-call), if  problems or concerns develop.  If the site bleeds, sit upright and  hold pressure on the site and above the collar bone for 10 minutes.   If it continues to bleed,  continue holding pressure and telephone  Interventional Radiology at the number(s) above.    KYRA POTTER, PA-C   XR Chest 1 View    Narrative    Exam: XR CHEST 1 VW, 6/6/2018 9:59 AM    Indication: RN placed PICC - verify tip placement;     Comparison: Radiograph of the chest 6/1/2018    Findings:   PA view of the chest. Interval removal of the right IJ central venous  catheter. New right PICC with the tip projecting over the low SVC.  Cardiomediastinal silhouette is stable. Pulmonary vascular structures  distinct. No new focal airspace opacity. No pneumothorax or pleural  effusion. Upper abdomen is unremarkable.      Impression    Impression: New right PICC with the tip projecting over the low SVC.    I have personally reviewed the examination and initial interpretation  and I agree with the findings.    MD Gabbi STRAUSS MD ([place])

## 2018-06-21 NOTE — MR AVS SNAPSHOT
After Visit Summary   6/21/2018    Parker Acevedo Sr    MRN: [id number]           Patient Information     Date Of Birth          1970        Visit Information        Provider Department      6/21/2018 10:00 AM Gabbi Thompson MD [facility]        Today's Diagnoses     Enterococcal bacteremia    -  1    Bloodstream infection associated with central venous catheter, subsequent encounter           Follow-ups after your visit        Your next 10 appointments already scheduled     Jun 27, 2018  1:00 PM CDT   Return Visit with Patti Milligan MD   [facility] ([facility])    [address] [address] [phone]              Who to contact     If you have questions or need follow up information about today's clinic visit or your schedule please contact [place] [facility] directly at [phone].  Normal or non-critical lab and imaging results will be communicated to you by ahoyDochart, letter or phone within 4 business days after the clinic has received the results. If you do not hear from us within 7 days, please contact the clinic through ahoyDochart or phone. If you have a critical or abnormal lab result, we will notify you by phone as soon as possible.  Submit refill requests through [facility] or call your pharmacy and they will forward the refill request to us. Please allow 3 business days for your refill to be completed.          Additional Information About Your Visit        MyChart Information     [facility] gives you secure access to your electronic health record. If you see a primary care provider, you can also send messages to your care team and make appointments. If you have questions, please call your primary care clinic.  If you do not have a primary care provider, please call [phone] and they will assist you.        Care EveryWhere ID      This is your Care EveryWhere ID. This could be used by other organizations to access your [place] medical records  [id number]        Your Vitals Were     Pulse Pulse Oximetry BMI (Body Mass Index)             62 100% 27.25 kg/m2          Blood Pressure from Last 3 Encounters:   06/21/18 145/60   06/06/18 124/83   05/03/18 124/78    Weight from Last 3 Encounters:   06/21/18 88.6 kg (195 lb 6.4 oz)   06/06/18 89.5 kg (197 lb 6.4 oz)   05/03/18 89.8 kg (198 lb)                 Today's Medication Changes          These changes are accurate as of 6/21/18 11:59 PM.  If you have any questions, ask your nurse or doctor.               These medicines have changed or have updated prescriptions.        Dose/Directions    sucralfate 1 GM/10ML suspension   Commonly known as:  CARAFATE   This may have changed:    - when to take this  - reasons to take this   Used for:  Nausea        Dose:  1 g   Take 10 mLs (1 g) by mouth 4 times daily   Quantity:  1200 mL   Refills:  11       vitamin D 19777 UNIT capsule   Commonly known as:  ERGOCALCIFEROL   This may have changed:  additional instructions   Used for:  Vitamin D deficiency        Dose:  88385 Units   Take 1 capsule (50,000 Units) by mouth every 7 days   Quantity:  12 capsule   Refills:  3                Primary Care Provider Office Phone # Fax #    Esteban Daly [phone] [address] [facility] [facility] [address]        Equal Access to Services     KATHRYN GUZMAN AH: Negra fregosoo Soheike, waaxda luqadaha, qaybta kaalmada adeegyada, waxkayleigh eligio tate [facility] rizvi. So [facility] [phone].    ATENCIÓN: Si habla español, tiene a hicks disposición servicios gratuitos de asistencia lingüística. Llame al [phone].    We comply with applicable federal civil rights laws and [place] laws. We do not discriminate on the basis of race, color, national origin, age, disability, sex, sexual orientation, or gender identity.            Thank you!   "   Thank you for choosing [facility]  for your care. Our goal is always to provide you with excellent care. Hearing back from our patients is one way we can continue to improve our services. Please take a few minutes to complete the written survey that you may receive in the mail after your visit with us. Thank you!             Your Updated Medication List - Protect others around you: Learn how to safely use, store and throw away your medicines at www.disposemymeds.org.          This list is accurate as of 6/21/18 11:59 PM.  Always use your most recent med list.                   Brand Name Dispense Instructions for use Diagnosis    acetaminophen 500 MG tablet    TYLENOL     Take 1,000 mg by mouth every 6 hours as needed for fever        albuterol 108 (90 Base) MCG/ACT Inhaler    PROAIR HFA/PROVENTIL HFA/VENTOLIN HFA    1 Inhaler    Inhale 2 puffs into the lungs every 4 hours as needed for shortness of breath / dyspnea or wheezing    Productive cough       amphetamine-dextroamphetamine 20 MG per tablet   Start taking on:  7/6/2018    ADDERALL    30 tablet    Take 1 tablet (20 mg) by mouth daily    ADHD (attention deficit hyperactivity disorder), inattentive type       COREG 12.5 MG tablet   Generic drug:  carvedilol      Take 12.5 mg by mouth 2 times daily (with meals)        cyanocobalamin 1000 MCG/ML injection    VITAMIN B12    1 mL    Inject 1 mL (1,000 mcg) into the muscle every 30 days    Bariatric surgery status       [place] [facility]NA[facility]ED PATIENT      Contact [facility] for patient specific medication information at [phone] on admission and discharge from the hospital.  Phones are answered 24 hours a day 7 days a week 365 days a year.  Providers - Choose \"CONTINUE HOME MED (no script)\" at discharge if patient treatment with home infusion will continue.    S/P bariatric surgery       * fentaNYL 12 mcg/hr 72 hr patch    DURAGESIC    15 patch    " "Place 1 patch onto the skin every 48 hours . Remove old patch.  Fill on/after 6/22 to start on/after 6/24    Chronic abdominal pain, Encounter for long-term opiate analgesic use       * fentaNYL 25 mcg/hr 72 hr patch    DURAGESIC    15 patch    Place 1 patch onto the skin every 48 hours remove old patch.  Release on/after 6/22/18 to start on/after 6/24/18    Chronic abdominal pain, Encounter for long-term opiate analgesic use       lidocaine 5 % Patch    LIDODERM    60 patch    Place 1-2 patches onto the skin every 24 hours Wear for 12 hours, remove for 12 hours.  OK to cut to better fit to size.    Chronic bilateral low back pain without sciatica, Chronic abdominal pain, Myofascial pain       naloxone nasal spray    NARCAN    0.2 mL    Spray 1 spray (4 mg) into one nostril alternating nostrils as needed for opioid reversal (every 2-3 minutes until assistance arrives.)    Encounter for long-term opiate analgesic use, Chronic abdominal pain       Needle (Disp) 27G X 1/2\" Misc    BD DISP NEEDLES    3 each    1 Device every 30 days Use for cyanocobalamin injection once q 30 days.    Bariatric surgery status       ondansetron 8 MG ODT tab    ZOFRAN-ODT    90 tablet    Take 1 tablet (8 mg) by mouth every 8 hours as needed for nausea    Nausea       * order for DME     12 each    Injection Supplies for Vitamin B12: 3cc syringes w/ 27 gauge needles, 1/2 inch length    Bariatric surgery status       * order for DME     4 each    Equipment being ordered: Bilateral knee high chronic venous insufficiency stockings--  mild-moderate pressures.    Bilateral edema of lower extremity       oxyCODONE 5 MG/5ML solution    ROXICODONE    1650 mL    Take 10-15 mLs (10-15 mg) by mouth every 4 hours as needed for moderate to severe pain Max of 55 mg/day this month. Wean as able Fill on/after 6/8/18 not to start untill 6/11/18. Early fill for travel.    Encounter for long-term opiate analgesic use, Chronic abdominal pain       parenteral " nutrition - PTA/DISCHARGE ORDER      [facility] to resume previous home TPN but run x 24 hours the first day until labs are ok. Then cycle x 14 hours per previous home regimen. He has been off TPN x 5 days here.        sucralfate 1 GM/10ML suspension    CARAFATE    1200 mL    Take 10 mLs (1 g) by mouth 4 times daily    Nausea       vitamin D 19835 UNIT capsule    ERGOCALCIFEROL    12 capsule    Take 1 capsule (50,000 Units) by mouth every 7 days    Vitamin D deficiency       * Notice:  This list has 4 medication(s) that are the same as other medications prescribed for you. Read the directions carefully, and ask your doctor or other care provider to review them with you.

## 2018-06-21 NOTE — NURSING NOTE
Chief Complaint   Patient presents with     RECHECK     follow up hospital      /60 (BP Location: Right arm)  Pulse 62  Wt 88.6 kg (195 lb 6.4 oz)  SpO2 100%  BMI 27.25 kg/m2   Kathrine Keys, CMA

## 2018-06-22 ENCOUNTER — HOME INFUSION (PRE-WILLOW HOME INFUSION) (OUTPATIENT)
Dept: PHARMACY | Facility: CLINIC | Age: 48
End: 2018-06-22

## 2018-06-25 ENCOUNTER — APPOINTMENT (OUTPATIENT)
Dept: LAB | Facility: CLINIC | Age: 48
End: 2018-06-25
Payer: COMMERCIAL

## 2018-06-25 ENCOUNTER — MEDICAL CORRESPONDENCE (OUTPATIENT)
Dept: PHARMACY | Facility: CLINIC | Age: 48
End: 2018-06-25

## 2018-06-25 ENCOUNTER — HOME INFUSION (PRE-WILLOW HOME INFUSION) (OUTPATIENT)
Dept: PHARMACY | Facility: CLINIC | Age: 48
End: 2018-06-25

## 2018-06-25 ENCOUNTER — TELEPHONE (OUTPATIENT)
Dept: INFECTIOUS DISEASES | Facility: CLINIC | Age: 48
End: 2018-06-25

## 2018-06-25 LAB
ALBUMIN SERPL-MCNC: 3.6 G/DL (ref 3.4–5)
ALP SERPL-CCNC: 94 U/L (ref 40–150)
ALT SERPL W P-5'-P-CCNC: 24 U/L (ref 0–70)
ANION GAP SERPL CALCULATED.3IONS-SCNC: 9 MMOL/L (ref 3–14)
AST SERPL W P-5'-P-CCNC: 16 U/L (ref 0–45)
BASOPHILS # BLD AUTO: 0 10E9/L (ref 0–0.2)
BASOPHILS NFR BLD AUTO: 0.5 %
BILIRUB DIRECT SERPL-MCNC: 0.1 MG/DL (ref 0–0.2)
BILIRUB SERPL-MCNC: 0.5 MG/DL (ref 0.2–1.3)
BUN SERPL-MCNC: 7 MG/DL (ref 7–30)
CALCIUM SERPL-MCNC: 8.3 MG/DL (ref 8.5–10.1)
CHLORIDE SERPL-SCNC: 109 MMOL/L (ref 94–109)
CO2 SERPL-SCNC: 28 MMOL/L (ref 20–32)
CREAT SERPL-MCNC: 0.64 MG/DL (ref 0.66–1.25)
DIFFERENTIAL METHOD BLD: ABNORMAL
EOSINOPHIL # BLD AUTO: 0.3 10E9/L (ref 0–0.7)
EOSINOPHIL NFR BLD AUTO: 4.7 %
ERYTHROCYTE [DISTWIDTH] IN BLOOD BY AUTOMATED COUNT: 13.6 % (ref 10–15)
GFR SERPL CREATININE-BSD FRML MDRD: >90 ML/MIN/1.7M2
GLUCOSE SERPL-MCNC: 73 MG/DL (ref 70–99)
HCT VFR BLD AUTO: 40.8 % (ref 40–53)
HGB BLD-MCNC: 13.3 G/DL (ref 13.3–17.7)
LYMPHOCYTES # BLD AUTO: 1.7 10E9/L (ref 0.8–5.3)
LYMPHOCYTES NFR BLD AUTO: 27.1 %
MAGNESIUM SERPL-MCNC: 2.1 MG/DL (ref 1.6–2.3)
MCH RBC QN AUTO: 31.7 PG (ref 26.5–33)
MCHC RBC AUTO-ENTMCNC: 32.6 G/DL (ref 31.5–36.5)
MCV RBC AUTO: 97 FL (ref 78–100)
MONOCYTES # BLD AUTO: 0.6 10E9/L (ref 0–1.3)
MONOCYTES NFR BLD AUTO: 10.4 %
NEUTROPHILS # BLD AUTO: 3.5 10E9/L (ref 1.6–8.3)
NEUTROPHILS NFR BLD AUTO: 57.3 %
PHOSPHATE SERPL-MCNC: 3 MG/DL (ref 2.5–4.5)
PLATELET # BLD AUTO: 101 10E9/L (ref 150–450)
POTASSIUM SERPL-SCNC: 3.6 MMOL/L (ref 3.4–5.3)
PROT SERPL-MCNC: 7 G/DL (ref 6.8–8.8)
RBC # BLD AUTO: 4.2 10E12/L (ref 4.4–5.9)
SODIUM SERPL-SCNC: 146 MMOL/L (ref 133–144)
TRIGL SERPL-MCNC: 74 MG/DL
WBC # BLD AUTO: 6.1 10E9/L (ref 4–11)

## 2018-06-25 PROCEDURE — 82248 BILIRUBIN DIRECT: CPT | Performed by: SURGERY

## 2018-06-25 PROCEDURE — 36415 COLL VENOUS BLD VENIPUNCTURE: CPT | Performed by: SURGERY

## 2018-06-25 PROCEDURE — 83735 ASSAY OF MAGNESIUM: CPT | Performed by: SURGERY

## 2018-06-25 PROCEDURE — 84478 ASSAY OF TRIGLYCERIDES: CPT | Performed by: SURGERY

## 2018-06-25 PROCEDURE — 84134 ASSAY OF PREALBUMIN: CPT | Performed by: SURGERY

## 2018-06-25 PROCEDURE — 85025 COMPLETE CBC W/AUTO DIFF WBC: CPT | Performed by: SURGERY

## 2018-06-25 PROCEDURE — 84100 ASSAY OF PHOSPHORUS: CPT | Performed by: SURGERY

## 2018-06-25 PROCEDURE — 80053 COMPREHEN METABOLIC PANEL: CPT | Performed by: SURGERY

## 2018-06-25 NOTE — PROGRESS NOTES
This is a recent snapshot of the patient's [facility] medical record.  For current drug dose and complete information and questions, call [phone] or In Basket pool, [facility] home infusion ([id number])  CSN Number:  [id number]

## 2018-06-25 NOTE — TELEPHONE ENCOUNTER
[facility] Call Center    Phone Message    May a detailed message be left on voicemail: no  -  Reason for Call: Other: Jami from  IV Infusion called. Patient and wife are under the impression that Dr. Thompson will be contacting them regarding his plan of care. He was in to see Dr. Thompson on Thursday, June 21. The Home Care IV nurse was reading Dr. Thompson's note & they need clarification. Please contact patient, [phone]     Action Taken: Message routed to:  [facility] ([facility]): Infectious Disease

## 2018-06-26 ENCOUNTER — HOME INFUSION (PRE-WILLOW HOME INFUSION) (OUTPATIENT)
Dept: PHARMACY | Facility: CLINIC | Age: 48
End: 2018-06-26

## 2018-06-26 LAB — PREALB SERPL IA-MCNC: 22 MG/DL (ref 15–45)

## 2018-06-26 NOTE — PROGRESS NOTES
This is a recent snapshot of the patient's [facility] medical record.  For current drug dose and complete information and questions, call [phone] or In Basket pool, [facility] home infusion ([id number])  CSN Number:  [id number]

## 2018-06-27 ENCOUNTER — HOME INFUSION (PRE-WILLOW HOME INFUSION) (OUTPATIENT)
Dept: PHARMACY | Facility: CLINIC | Age: 48
End: 2018-06-27

## 2018-06-27 ENCOUNTER — OFFICE VISIT (OUTPATIENT)
Dept: PALLIATIVE MEDICINE | Facility: CLINIC | Age: 48
End: 2018-06-27
Payer: COMMERCIAL

## 2018-06-27 VITALS
SYSTOLIC BLOOD PRESSURE: 146 MMHG | HEART RATE: 67 BPM | BODY MASS INDEX: 26.92 KG/M2 | DIASTOLIC BLOOD PRESSURE: 82 MMHG | WEIGHT: 193 LBS

## 2018-06-27 DIAGNOSIS — E43 SEVERE MALNUTRITION (H): Primary | ICD-10-CM

## 2018-06-27 DIAGNOSIS — R10.9 CHRONIC ABDOMINAL PAIN: ICD-10-CM

## 2018-06-27 DIAGNOSIS — Z93.1 GASTROSTOMY TUBE IN PLACE (H): ICD-10-CM

## 2018-06-27 DIAGNOSIS — Z79.891 ENCOUNTER FOR LONG-TERM OPIATE ANALGESIC USE: ICD-10-CM

## 2018-06-27 DIAGNOSIS — G89.29 CHRONIC ABDOMINAL PAIN: ICD-10-CM

## 2018-06-27 PROCEDURE — 99213 OFFICE O/P EST LOW 20 MIN: CPT | Performed by: PSYCHIATRY & NEUROLOGY

## 2018-06-27 ASSESSMENT — PAIN SCALES - GENERAL: PAINLEVEL: EXTREME PAIN (8)

## 2018-06-27 NOTE — PROGRESS NOTES
[facility]    Date of visit: 2018     Chief complaint:    Chief Complaint   Patient presents with     Pain       Interval history:  Parker Acevedo was last seen by me on 18    Recommendations/plan at the last visit included:  1. Discussed again risks with opioid dosing, he will check to make sure that narcan is not   2. Decrease fentanyl to 37mcg/hr  3. Follow up in 3 months.      Since his last visit, Parker Acevedo reports:  -has been in the hospital, infection with the Cm which was removed, and change of the Gtube.  Has green bile drainage which is coming from the stomach-- has bags that is draining to  -will be getting a new Cm and some point, at this point PICC is not being removed.   Had recent blood cultures    Pain scores:  Pain intensity  5-6/10    Current pain treatments:   Oxycodone 10-15 mg liquid by G tube QID hours, total of 55 mg daily (MEDD 82.5 mg)- this is up from previous amounts, related to recent procedures/illness  Fentanyl 37mcg/hr patch q48 hours - down from 50mcg/hr  Lidocaine patches  Naloxone- has from previous hospitalization.    Previous medication treatments included:   Valium 5 mg/day, 1 tab in AM and PM-stopped in 2017  Tylenol #3   Vicodin   Tramadol   Diazepam- for sleep/anxiety  Gabapentin- bad headaches, acid reflux  Oxycodone max of 45mg/day.      Side Effects: no side effects    Medications:  Current Outpatient Prescriptions   Medication Sig Dispense Refill     acetaminophen (TYLENOL) 500 MG tablet Take 1,000 mg by mouth every 6 hours as needed for fever       albuterol (PROAIR HFA/PROVENTIL HFA/VENTOLIN HFA) 108 (90 Base) MCG/ACT Inhaler Inhale 2 puffs into the lungs every 4 hours as needed for shortness of breath / dyspnea or wheezing 1 Inhaler 3     [START ON 2018] amphetamine-dextroamphetamine (ADDERALL) 20 MG per tablet Take 1 tablet (20 mg) by mouth daily 30 tablet 0     carvedilol  "(COREG) 12.5 MG tablet Take 12.5 mg by mouth 2 times daily (with meals)       cyanocobalamin (VITAMIN B12) 1000 MCG/ML injection Inject 1 mL (1,000 mcg) into the muscle every 30 days 1 mL 11     [facility] INF[facility]ION MANAGED PATIENT Contact [facility] for patient specific medication information at [phone] on admission and discharge from the hospital.  Phones are answered 24 hours a day 7 days a week 365 days a year.    Providers - Choose \"CONTINUE HOME MED (no script)\" at discharge if patient treatment with home infusion will continue.       fentaNYL (DURAGESIC) 12 mcg/hr 72 hr patch Place 1 patch onto the skin every 48 hours . Remove old patch.  Fill on/after 6/22 to start on/after 6/24 15 patch 0     fentaNYL (DURAGESIC) 25 mcg/hr 72 hr patch Place 1 patch onto the skin every 48 hours remove old patch.  Release on/after 6/22/18 to start on/after 6/24/18 15 patch 0     lidocaine (LIDODERM) 5 % Patch Place 1-2 patches onto the skin every 24 hours Wear for 12 hours, remove for 12 hours.  OK to cut to better fit to size. 60 patch 6     naloxone (NARCAN) nasal spray Spray 1 spray (4 mg) into one nostril alternating nostrils as needed for opioid reversal (every 2-3 minutes until assistance arrives.) 0.2 mL 0     Needle, Disp, (BD DISP NEEDLES) 27G X 1/2\" MISC 1 Device every 30 days Use for cyanocobalamin injection once q 30 days. 3 each 4     ondansetron (ZOFRAN-ODT) 8 MG ODT tab Take 1 tablet (8 mg) by mouth every 8 hours as needed for nausea 90 tablet 3     order for DME Equipment being ordered: Bilateral knee high chronic venous insufficiency stockings--  mild-moderate pressures. 4 each 5     order for DME Injection Supplies for Vitamin B12: 3cc syringes w/ 27 gauge needles, 1/2 inch length 12 each 0     oxyCODONE (ROXICODONE) 5 MG/5ML solution Take 10-15 mLs (10-15 mg) by mouth every 4 hours as needed for moderate to severe pain Max of 55 mg/day this month. Wean as able Fill on/after 6/8/18 not " to start untill 6/11/18. Early fill for travel. 1650 mL 0     parenteral nutrition - PTA/DISCHARGE ORDER [facility] to resume previous home TPN but run x 24 hours the first day until labs are ok. Then cycle x 14 hours per previous home regimen. He has been off TPN x 5 days here.       sucralfate (CARAFATE) 1 GM/10ML suspension Take 10 mLs (1 g) by mouth 4 times daily (Patient taking differently: Take 1 g by mouth every 4 hours as needed (EPIGASTRIC PAIN, BILE BACKUP) ) 1200 mL 11     vitamin D (ERGOCALCIFEROL) 63463 UNIT capsule Take 1 capsule (50,000 Units) by mouth every 7 days (Patient taking differently: Take 50,000 Units by mouth every 7 days On sundays) 12 capsule 3     [DISCONTINUED] naloxone (NARCAN) nasal spray Spray 1 spray (4 mg) into one nostril alternating nostrils as needed for opioid reversal (every 2-3 minutes until assistance arrives.) 0.2 mL 0     [DISCONTINUED] NO ACTIVE MEDICATIONS . 0 0       Medical History: any changes in medical history since they were last seen? As above    Review of Systems:  The 14 system ROS was reviewed from the intake questionnaire, and is positive for: headache, itching, abdominal pain, nausea, joint pain, diarrhea, chest pain (from neck pain, better after lidocaine patch used on neck/shoudler), headache  Any bowel or bladder problems: diarrhea, bladder normal  Mood: anxiety- slightly worse    Physical Exam:  Blood pressure 146/82, pulse 67, weight 87.5 kg (193 lb).  General: awake, alert  Gait: Slow  MSK exam: hunched posture  G tube draining green fluid      THE 4 A's OF OPIOID MAINTENANCE ANALGESIA    Analgesia: good    Activity: on disability    Adverse effects: none    Adherence to Rx protocol: good- [place] Prescription Monitoring Program checked 1/3/2018 appropriate      Assessment:   1. Chronic abdominal pain, with history of gastric bypass and later peptic ulcer   2. G tube placement- only used for venting  3. Myofascial abdominal pain, with significant guarding and poor  posture  4. Opioid tolerance  5. Anxiety  6. Foot pain with tendonous injury- healed  7. Left arm weakness, consistent with radial nerve injury- improving  8. Port placement- h/o recurrent infections      Plan:   1. Discussed again risks with opioid dosing, including fentanyl patches and fever.  New script for narcan also given.  2. Next script of fentanyl will be to 25mcg/hr.  3. This month, will try to decrease the oxycodone to 45-50mg/day. Next month, will stay at 50mg/day of oxycodone while coming down on the patch. Then the next month would be 45mg/day  If calls for procedure, likely would increase by ~10mg/day of oxycodone and keep other meds the same.  4. Follow up in 3 months.    Total time spent was 15 minutes, and more than 50% of face to face time was spent in counseling and/or coordination of care regarding medications, opioid risks.   Jessica Milligan MD  [facility]

## 2018-06-27 NOTE — TELEPHONE ENCOUNTER
Spoke with RN w/[facility]. She reports that pt had fevers ranging from  for the past 5 days, malaise, pallor, and high output from his G tube. Wondering if Dr. Thompson has any new orders.  Astrid Saucedo RN

## 2018-06-27 NOTE — MR AVS SNAPSHOT
After Visit Summary   6/27/2018    Parker Acevedo Sr    MRN: [id number]           Patient Information     Date Of Birth          1970        Visit Information        Provider Department      6/27/2018 1:00 PM Patti Milligan MD [facility]        Today's Diagnoses     Encounter for long-term opiate analgesic use        Chronic abdominal pain          Care Instructions    1. Decrease the oxycodone now, to 45-50mg/day.  You should do this in order to help with the slow wean.  2. Next month, we will refill the fentanyl at 25mcg/hr only, and will do the oxycodone at 50mg/day.  The following month, we will drop the oxycodone to 45mg/day.  3. Schedule follow up in 3 months.    ----------------------------------------------------------------  Nurse Triage line:  [phone]   Call this number with any questions or concerns. You may leave a detailed message anytime. Calls are typically returned Monday through Friday between 8 AM and 4:30 PM. We usually get back to you within 2 business days depending on the issue/request.       Medication refills:    For non-narcotic medications, call your pharmacy directly to request a refill. The pharmacy will contact the [facility] for authorization. Please allow 3-4 days for these refills to be processed.     For narcotic refills, call the nurse triage line or send a Yesmail message. Please contact us 7-10 days before your refill is due. The message MUST include the name of the specific medication(s) requested and how you would like to receive the prescription(s). The options are as follows:    Pain Clinic staff can mail the prescription to your pharmacy. Please tell us the name of the pharmacy.    You may pick the prescription up at the Pain Clinic (tell us the location) or during a clinic visit with your pain provider    Pain Clinic staff can deliver the prescription to the [place] pharmacy in the clinic building. Please tell  us the location.      Scheduling number: [phone].  Call this number to schedule or change appointments.    We believe regular attendance is key to your success in our program.    Any time you are unable to keep your appointment we ask that you call us at least 24 hours in advance to let us know. This will allow us to offer the appointment time to another patient.               Follow-ups after your visit        Who to contact     If you have questions or need follow up information about today's clinic visit or your schedule please contact [facility] directly at [phone].  Normal or non-critical lab and imaging results will be communicated to you by [employer], letter or phone within 4 business days after the clinic has received the results. If you do not hear from us within 7 days, please contact the clinic through airpimt or phone. If you have a critical or abnormal lab result, we will notify you by phone as soon as possible.  Submit refill requests through Zaya or call your pharmacy and they will forward the refill request to us. Please allow 3 business days for your refill to be completed.          Additional Information About Your Visit        [employer] Information     Zaya gives you secure access to your electronic health record. If you see a primary care provider, you can also send messages to your care team and make appointments. If you have questions, please call your primary care clinic.  If you do not have a primary care provider, please call [phone] and they will assist you.        Care EveryWhere ID     This is your Care EveryWhere ID. This could be used by other organizations to access your [place] medical records  [id number]        Your Vitals Were     Pulse BMI (Body Mass Index)                67 26.92 kg/m2           Blood Pressure from Last 3 Encounters:   06/27/18 146/82   06/21/18 145/60   06/06/18 124/83    Weight from Last 3 Encounters:   06/27/18 87.5 kg (193 lb)    06/21/18 88.6 kg (195 lb 6.4 oz)   06/06/18 89.5 kg (197 lb 6.4 oz)              Today, you had the following     No orders found for display         Today's Medication Changes          These changes are accurate as of 6/27/18  1:18 PM.  If you have any questions, ask your nurse or doctor.               These medicines have changed or have updated prescriptions.        Dose/Directions    sucralfate 1 GM/10ML suspension   Commonly known as:  CARAFATE   This may have changed:    - when to take this  - reasons to take this   Used for:  Nausea        Dose:  1 g   Take 10 mLs (1 g) by mouth 4 times daily   Quantity:  1200 mL   Refills:  11       vitamin D 50990 UNIT capsule   Commonly known as:  ERGOCALCIFEROL   This may have changed:  additional instructions   Used for:  Vitamin D deficiency        Dose:  73862 Units   Take 1 capsule (50,000 Units) by mouth every 7 days   Quantity:  12 capsule   Refills:  3            Where to get your medicines      These [place] [facility] [place] [address] [place] [address]  [address] [facility], [facility] [address]     Phone:  [phone]     naloxone nasal spray                Primary Care Provider Office Phone # Fax #    Esteban Daly [phone] [address] [facility] [address]        Equal Access to Services     KATHRYN GUZMAN AH: Hadii kameron ku hadasho Soomaali, waaxda luqadaha, qaybta kaalmada adeegyada, carmela del valle haydisha rizvi. So [facility] [phone].    ATENCIÓN: Si habla español, tiene a hicks disposición servicios gratuitos de asistencia lingüística. Llame al [phone].    We comply with applicable federal civil rights laws and [place] laws. We do not discriminate on the basis of race, color, national origin, age, disability, sex, sexual orientation, or gender identity.            Thank you!     Thank you for choosing [facility]  for your care. Our goal is always to provide you with  "excellent care. Hearing back from our patients is one way we can continue to improve our services. Please take a few minutes to complete the written survey that you may receive in the mail after your visit with us. Thank you!             Your Updated Medication List - Protect others around you: Learn how to safely use, store and throw away your medicines at www.disposemymeds.org.          This list is accurate as of 6/27/18  1:18 PM.  Always use your most recent med list.                   Brand Name Dispense Instructions for use Diagnosis    acetaminophen 500 MG tablet    TYLENOL     Take 1,000 mg by mouth every 6 hours as needed for fever        albuterol 108 (90 Base) MCG/ACT Inhaler    PROAIR HFA/PROVENTIL HFA/VENTOLIN HFA    1 Inhaler    Inhale 2 puffs into the lungs every 4 hours as needed for shortness of breath / dyspnea or wheezing    Productive cough       amphetamine-dextroamphetamine 20 MG per tablet   Start taking on:  7/6/2018    ADDERALL    30 tablet    Take 1 tablet (20 mg) by mouth daily    ADHD (attention deficit hyperactivity disorder), inattentive type       COREG 12.5 MG tablet   Generic drug:  carvedilol      Take 12.5 mg by mouth 2 times daily (with meals)        cyanocobalamin 1000 MCG/ML injection    VITAMIN B12    1 mL    Inject 1 mL (1,000 mcg) into the muscle every 30 days    Bariatric surgery status       [facility] PATIENT      Contact [facility] for patient specific medication information at [phone] on admission and discharge from the hospital.  Phones are answered 24 hours a day 7 days a week 365 days a year.  Providers - Choose \"CONTINUE HOME MED (no script)\" at discharge if patient treatment with home infusion will continue.    S/P bariatric surgery       * fentaNYL 12 mcg/hr 72 hr patch    DURAGESIC    15 patch    Place 1 patch onto the skin every 48 hours . Remove old patch.  Fill on/after 6/22 to start on/after 6/24    Chronic abdominal " "pain, Encounter for long-term opiate analgesic use       * fentaNYL 25 mcg/hr 72 hr patch    DURAGESIC    15 patch    Place 1 patch onto the skin every 48 hours remove old patch.  Release on/after 6/22/18 to start on/after 6/24/18    Chronic abdominal pain, Encounter for long-term opiate analgesic use       lidocaine 5 % Patch    LIDODERM    60 patch    Place 1-2 patches onto the skin every 24 hours Wear for 12 hours, remove for 12 hours.  OK to cut to better fit to size.    Chronic bilateral low back pain without sciatica, Chronic abdominal pain, Myofascial pain       naloxone nasal spray    NARCAN    0.2 mL    Spray 1 spray (4 mg) into one nostril alternating nostrils as needed for opioid reversal (every 2-3 minutes until assistance arrives.)    Encounter for long-term opiate analgesic use, Chronic abdominal pain       Needle (Disp) 27G X 1/2\" Misc    BD DISP NEEDLES    3 each    1 Device every 30 days Use for cyanocobalamin injection once q 30 days.    Bariatric surgery status       ondansetron 8 MG ODT tab    ZOFRAN-ODT    90 tablet    Take 1 tablet (8 mg) by mouth every 8 hours as needed for nausea    Nausea       * order for DME     12 each    Injection Supplies for Vitamin B12: 3cc syringes w/ 27 gauge needles, 1/2 inch length    Bariatric surgery status       * order for DME     4 each    Equipment being ordered: Bilateral knee high chronic venous insufficiency stockings--  mild-moderate pressures.    Bilateral edema of lower extremity       oxyCODONE 5 MG/5ML solution    ROXICODONE    1650 mL    Take 10-15 mLs (10-15 mg) by mouth every 4 hours as needed for moderate to severe pain Max of 55 mg/day this month. Wean as able Fill on/after 6/8/18 not to start untill 6/11/18. Early fill for travel.    Encounter for long-term opiate analgesic use, Chronic abdominal pain       parenteral nutrition - PTA/DISCHARGE ORDER      [facility] to resume previous home TPN but run x 24 hours the first day until labs are ok. Then " cycle x 14 hours per previous home regimen. He has been off TPN x 5 days here.        sucralfate 1 GM/10ML suspension    CARAFATE    1200 mL    Take 10 mLs (1 g) by mouth 4 times daily    Nausea       vitamin D 54474 UNIT capsule    ERGOCALCIFEROL    12 capsule    Take 1 capsule (50,000 Units) by mouth every 7 days    Vitamin D deficiency       * Notice:  This list has 4 medication(s) that are the same as other medications prescribed for you. Read the directions carefully, and ask your doctor or other care provider to review them with you.

## 2018-06-27 NOTE — PATIENT INSTRUCTIONS
1. Decrease the oxycodone now, to 45-50mg/day.  You should do this in order to help with the slow wean.  2. Next month, we will refill the fentanyl at 25mcg/hr only, and will do the oxycodone at 50mg/day.  The following month, we will drop the oxycodone to 45mg/day.  3. Schedule follow up in 3 months.    ----------------------------------------------------------------  Nurse Triage line:  [phone]   Call this number with any questions or concerns. You may leave a detailed message anytime. Calls are typically returned Monday through Friday between 8 AM and 4:30 PM. We usually get back to you within 2 business days depending on the issue/request.       Medication refills:    For non-narcotic medications, call your pharmacy directly to request a refill. The pharmacy will contact the [facility] for authorization. Please allow 3-4 days for these refills to be processed.     For narcotic refills, call the nurse triage line or send a [employer]naPaisa message. Please contact us 7-10 days before your refill is due. The message MUST include the name of the specific medication(s) requested and how you would like to receive the prescription(s). The options are as follows:    Pain Clinic staff can mail the prescription to your pharmacy. Please tell us the name of the pharmacy.    You may pick the prescription up at the Pain Clinic (tell us the location) or during a clinic visit with your pain provider    Pain Clinic staff can deliver the prescription to the [facility] pharmacy in the clinic building. Please tell us the location.      Scheduling number: [phone].  Call this number to schedule or change appointments.    We believe regular attendance is key to your success in our program.    Any time you are unable to keep your appointment we ask that you call us at least 24 hours in advance to let us know. This will allow us to offer the appointment time to another patient.

## 2018-06-28 ENCOUNTER — APPOINTMENT (OUTPATIENT)
Dept: LAB | Facility: CLINIC | Age: 48
End: 2018-06-28
Payer: COMMERCIAL

## 2018-06-28 ENCOUNTER — HOME INFUSION (PRE-WILLOW HOME INFUSION) (OUTPATIENT)
Dept: PHARMACY | Facility: CLINIC | Age: 48
End: 2018-06-28

## 2018-06-28 ENCOUNTER — PATIENT OUTREACH (OUTPATIENT)
Dept: CARE COORDINATION | Facility: CLINIC | Age: 48
End: 2018-06-28

## 2018-06-28 LAB — CRP SERPL-MCNC: <2.9 MG/L (ref 0–8)

## 2018-06-28 PROCEDURE — 36415 COLL VENOUS BLD VENIPUNCTURE: CPT | Performed by: SURGERY

## 2018-06-28 PROCEDURE — 86140 C-REACTIVE PROTEIN: CPT | Performed by: SURGERY

## 2018-06-28 PROCEDURE — 87040 BLOOD CULTURE FOR BACTERIA: CPT | Performed by: SURGERY

## 2018-06-28 NOTE — PROGRESS NOTES
This is a recent snapshot of the patient's [facility] medical record.  For current drug dose and complete information and questions, call [phone] or In [facility] pool, fv home infusion ([id number])  CSN Number:  [id number]

## 2018-06-28 NOTE — PROGRESS NOTES
Clinic Care Coordination Contact    Clinic Care Coordination Contact  OUTREACH    Referral Information:  Referral Source: IP Report    Primary Diagnosis: SIRS/Sepsis    Chief Complaint   Patient presents with     Clinic Care Coordination - Follow-up     RN        [place] Utilization:   Clinic Utilization  Difficulty keeping appointments:: No  Utilization    Last refreshed: 6/28/2018 11:33 AM:  No Show Count (past year) 2       Last refreshed: 6/28/2018 11:33 AM:  ED visits 3       Last refreshed: 6/28/2018 11:33 AM:  Hospital admissions 4          Current as of: 6/28/2018 11:33 AM             Clinical Concerns:  Current Medical Concerns:  RN CC spoke with spouse Rose (consent to communicate on file) who states she has been in contact with patient's infectious disease doctor and home infusion nurses regarding patient's temperature that continues to fluctuate in the 100's, temp last night was 100.8.  Rose states the home care nurse will be out today with the blood culture bottles to recheck.  RN CLARITZA noted patient rescheduled and then canceled his hospital f/u appointment with PCP.  Rose states patient is working on f/u with specialists, but did agree to schedule an appointment for patient with PCP to f/u and refill medications for 7/25/18.    Patient Active Problem List   Diagnosis     Peptic ulcer disease     Gastric bypass status for obesity     CARDIOVASCULAR SCREENING; LDL GOAL LESS THAN 160     Chronic abdominal pain     Ulcer (H)     Vomiting     Anemia     ADHD (attention deficit hyperactivity disorder), inattentive type     Vitamin B12 deficiency without anemia     Thiamine deficiency     Dehydration     Dysphagia     Weight loss, non-intentional     Malnutrition (H)     Chronic anxiety     Constipation     Bile reflux esophagitis     Former smoker     Vitamin D deficiency     Iron deficiency     Health Care Home     Chronic pain     Insomnia     Positive blood culture     Fungemia     Chronic nausea      Gastrostomy tube in place (H)     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)     Severe malnutrition (H)     Short gut syndrome     Anxiety     Status post cervical spinal arthrodesis     Port or reservoir infection, initial encounter     Abnormal echocardiogram     Bacteremia     Low serum iron     Anemia, iron deficiency     Catheter-related bloodstream infection (CRBSI)     E coli bacteremia     Generalized weakness        Current Behavioral Concerns: Patient with ADHD followed by Dr. Daly.    Education Provided to patient: RN CC reviewed cancelled appointment with PCP.   Pain  Chronic pain (GOAL):: Yes  Location of chronic pain:: chronic abdominal pain  Chronic pain timing:: Constant  Chronic pain severity:: 5  Limitation of routine activities due to chronic pain:: Yes  Health Maintenance Reviewed: Due/Overdue   Clinical Pathway: None    Medication Management:  Patient/spouse independent in medication management and verbalizes adherence and understanding of medication regimen.       Functional Status:  Dependent ADLs:: Ambulation-cane  Bed or wheelchair confined:: No  Mobility Status: Independent    Living Situation:  Current living arrangement:: I live in a private home with spouse  [facility]    Diet/Exercise/Sleep:  Diet:: Regular  Inadequate nutrition (GOAL):: No  Food Insecurity: No  Tube Feeding: No  Exercise:: Currently not exercising  Inadequate activity/exercise (GOAL):: No  Significant changes in sleep pattern (GOAL): No    Transportation:  Transportation concerns (GOAL):: No  Transportation means:: Regular car     Psychosocial:  Pentecostal or spiritual beliefs that impact treatment:: No  Mental health DX:: Yes  Mental health DX how managed:: Medication  Mental health management concern (GOAL):: No  Informal Support system:: Family, Spouse     Financial/Insurance:   Financial/Insurance concerns (GOAL):: No       Resources and Interventions:  Current Resources:    ;    Community Resources: Home Infusion     Equipment Currently Used at Home: cane, straight    Advance Care Plan/Directive  Advanced Care Plans/Directives on file:: Yes  Type Advanced Care Plans/Directives: Advanced Directive - On File  Advanced Care Plan/Directive Status: Not Applicable          Goals: n/a        Patient/Caregiver understanding: Spouse has a good understanding of current plan of care and disease process and has been reaching out appropriately to specialties and [facility] nurses.  Spouse requests for Primary Care RN CC to continue to check in every month regarding patient.    Outreach Frequency: monthly  Future Appointments              In 3 weeks Esteban Daly MD [facility] [place] [facility]    In 2 months Patti Milligan MD [facility], [facility]          Plan:   1. Patient will continue to follow treatment plan as directed and follow up with PCP and specialists with concerns ongoing.   2. Patient will see PCP as scheduled 7/25/18.  3. RN CC will follow patient every 4-6 weeks and remain available to patient, spouse, Home Infusion and care team as needed.    Melissa Behl BSN, RN, PHN  [facility] Care Coordinator  [phone]

## 2018-06-28 NOTE — TELEPHONE ENCOUNTER
Gabbi Thompson MD   You; Cristina Fregoso RN 10 hours ago (11:23 PM)                 Astrid,     I want them to check a CRP and one more set of blood cultures.     Gabbi (Routing comment)         Called [facility] and gave above orders from Dr. Thompson to Malik, pharmacist.  Astrid Saucedo RN

## 2018-06-28 NOTE — PROGRESS NOTES
This is a recent snapshot of the patient's [facility] medical record.  For current drug dose and complete information and questions, call [phone] or In Basket pool, fv home infusion ([id number])  CSN Number:  [id number]

## 2018-06-29 ENCOUNTER — HOME INFUSION (PRE-WILLOW HOME INFUSION) (OUTPATIENT)
Dept: PHARMACY | Facility: CLINIC | Age: 48
End: 2018-06-29

## 2018-06-29 NOTE — PROGRESS NOTES
This is a recent snapshot of the patient's [facility] medical record.  For current drug dose and complete information and questions, call [phone] or In Basket pool, fv home infusion ([id number])  CSN Number:  [id number]

## 2018-06-30 ENCOUNTER — HOME INFUSION (PRE-WILLOW HOME INFUSION) (OUTPATIENT)
Dept: PHARMACY | Facility: CLINIC | Age: 48
End: 2018-06-30

## 2018-07-01 ENCOUNTER — APPOINTMENT (OUTPATIENT)
Dept: LAB | Facility: CLINIC | Age: 48
End: 2018-07-01
Attending: INTERNAL MEDICINE
Payer: COMMERCIAL

## 2018-07-02 ENCOUNTER — MYC MEDICAL ADVICE (OUTPATIENT)
Dept: PALLIATIVE MEDICINE | Facility: CLINIC | Age: 48
End: 2018-07-02

## 2018-07-02 ENCOUNTER — HOME INFUSION (PRE-WILLOW HOME INFUSION) (OUTPATIENT)
Dept: PHARMACY | Facility: CLINIC | Age: 48
End: 2018-07-02

## 2018-07-02 DIAGNOSIS — G89.29 CHRONIC ABDOMINAL PAIN: ICD-10-CM

## 2018-07-02 DIAGNOSIS — Z79.891 ENCOUNTER FOR LONG-TERM OPIATE ANALGESIC USE: ICD-10-CM

## 2018-07-02 DIAGNOSIS — R10.9 CHRONIC ABDOMINAL PAIN: ICD-10-CM

## 2018-07-02 NOTE — PROGRESS NOTES
This is a recent snapshot of the patient's [facility] medical record.  For current drug dose and complete information and questions, call [phone] or In Basket pool, fv home infusion ([id number])  CSN Number:  [id number]

## 2018-07-02 NOTE — PROGRESS NOTES
This is a recent snapshot of the patient's [facility] medical record.  For current drug dose and complete information and questions, call [phone] or In Basket pool, fv home infusion ([id number])  CSN Number:  [id number]

## 2018-07-03 RX ORDER — FENTANYL 25 UG/1
1 PATCH TRANSDERMAL
Qty: 10 PATCH | Refills: 0 | Status: SHIPPED | OUTPATIENT
Start: 2018-07-03 | End: 2018-07-30

## 2018-07-03 RX ORDER — FENTANYL 12.5 UG/1
1 PATCH TRANSDERMAL
Qty: 10 PATCH | Refills: 0 | Status: SHIPPED | OUTPATIENT
Start: 2018-07-03 | End: 2018-07-31 | Stop reason: DRUGHIGH

## 2018-07-03 RX ORDER — OXYCODONE HCL 5 MG/5 ML
10-15 SOLUTION, ORAL ORAL EVERY 4 HOURS PRN
Qty: 1650 ML | Refills: 0 | Status: SHIPPED | OUTPATIENT
Start: 2018-07-03 | End: 2018-07-30

## 2018-07-03 NOTE — TELEPHONE ENCOUNTER
Received call from patient requesting refill(s) of oxyCODONE (ROXICODONE) 5 MG/5ML solution and fentaNYL (DURAGESIC) 12 mcg/hr 72 hr patch     Last picked up from pharmacy on     oxyCODONE (ROXICODONE) 5 MG/5ML solution 6/8/2018   fentaNYL (DURAGESIC) 12 mcg/hr 72 hr patch 6/22/2018    [facility] [address] [address]  Phone: [phone] Fax: [phone]    Pt last seen by prescribing provider on 6/27/18  Next appt scheduled for 9/19/18      checked in the past 6 months? Yes If no, print current report and give to RN     Last urine drug screen date 4/25/18  Current opioid agreement on file (completed within the last year) Yes Date of opioid agreement: 12/7/17     Processing (pick one and delete the others):       [facility] clinic    Message from patient:  I'm letting you know that over the weekend with the heat I sweated off 2 of each of the fentynal patches plus I need to refill oxycodone to be picked up on the 13th of July and plus you can also send the prescription for my fentynal patches at the same time any questions feel free to call us at [phone] thanks again     Will route to nursing pool for review and preparation of prescription(s).

## 2018-07-03 NOTE — TELEPHONE ENCOUNTER
Parker and Rose,  When you have a patch fall off, could you let us know what day it is?  Our start dates change based on if you are 1 or 2 days into the patch.    Thanks    Jessica Milligan MD  [facility]

## 2018-07-03 NOTE — PROGRESS NOTES
This is a recent snapshot of the patient's [facility] medical record.  For current drug dose and complete information and questions, call [phone] or In Basket pool, fv home infusion ([id number])  CSN Number:  [id number]

## 2018-07-03 NOTE — TELEPHONE ENCOUNTER
Medication refill information reviewed. 20 day supply of Fentanyl will help line up fill dates next month. Please review.    Due date for     oxyCODONE (ROXICODONE) 5 MG/5ML solution is 7/11/18  fentaNYL (DURAGESIC) 12 mcg/hr 72 hr patch is 7/20/18  fentaNYL (DURAGESIC) 12 mcg/hr 72 hr patch is 7/20/18    Prescriptions prepped for review.     Will route to provider.

## 2018-07-03 NOTE — TELEPHONE ENCOUNTER
These prescriptions have been mailed to the pharmacy and the patient is notified.    YADIRA LazarN, RN  Care Coordinator  [facility] [place]

## 2018-07-03 NOTE — TELEPHONE ENCOUNTER
Signed at [place], given to MA or nursing team.      Signed Prescriptions:                        Disp   Refills    fentaNYL (DURAGESIC) 12 mcg/hr 72 hr patch 10 pat*0        Sig: Place 1 patch onto the skin every 48 hours . Remove           old patch.  Fill on/after 7/18/18 to start           on/after 7/20/18. This is a 20 day supply to           match up scripts.  Early release due to patches           falling off.  Authorizing Provider: BRANDYN JENKINS    fentaNYL (DURAGESIC) 25 mcg/hr 72 hr patch 10 pat*0        Sig: Place 1 patch onto the skin every 48 hours remove old           patch.  Release on/after 7/18/18 to start           on/after 7/20/18. This is a 20 day supply to           match up scripts. Early release due to patches           falling off.  Authorizing Provider: BRANDYN JENKINS    oxyCODONE (ROXICODONE) 5 MG/5ML solution   1650 mL0        Sig: Take 10-15 mLs (10-15 mg) by mouth every 4 hours as           needed for moderate to severe pain Max of 55           mg/day this month. Wean as able Fill on/after           7/9/18 not to start untill 7/11/18.  Authorizing Provider: BRANDYN JENKINS MD  [facility]

## 2018-07-05 ENCOUNTER — HOSPITAL ENCOUNTER (OUTPATIENT)
Dept: LAB | Facility: CLINIC | Age: 48
Discharge: HOME | End: 2018-07-05
Attending: INTERNAL MEDICINE | Admitting: INTERNAL MEDICINE
Payer: COMMERCIAL

## 2018-07-05 ENCOUNTER — HOME INFUSION (PRE-WILLOW HOME INFUSION) (OUTPATIENT)
Dept: PHARMACY | Facility: CLINIC | Age: 48
End: 2018-07-05

## 2018-07-05 LAB
ALBUMIN SERPL-MCNC: 3.6 G/DL (ref 3.4–5)
ALP SERPL-CCNC: 87 U/L (ref 40–150)
ALT SERPL W P-5'-P-CCNC: 21 U/L (ref 0–70)
ANION GAP SERPL CALCULATED.3IONS-SCNC: 10 MMOL/L (ref 3–14)
AST SERPL W P-5'-P-CCNC: 12 U/L (ref 0–45)
BASOPHILS # BLD AUTO: 0.1 10E9/L (ref 0–0.2)
BASOPHILS NFR BLD AUTO: 1.3 %
BILIRUB SERPL-MCNC: 0.3 MG/DL (ref 0.2–1.3)
BUN SERPL-MCNC: 12 MG/DL (ref 7–30)
CALCIUM SERPL-MCNC: 8.1 MG/DL (ref 8.5–10.1)
CHLORIDE SERPL-SCNC: 107 MMOL/L (ref 94–109)
CO2 SERPL-SCNC: 27 MMOL/L (ref 20–32)
CREAT SERPL-MCNC: 0.67 MG/DL (ref 0.66–1.25)
DIFFERENTIAL METHOD BLD: ABNORMAL
EOSINOPHIL NFR BLD AUTO: 4.7 %
ERYTHROCYTE [DISTWIDTH] IN BLOOD BY AUTOMATED COUNT: 13.3 % (ref 10–15)
GFR SERPL CREATININE-BSD FRML MDRD: >90 ML/MIN/1.7M2
GLUCOSE SERPL-MCNC: 77 MG/DL (ref 70–99)
HCT VFR BLD AUTO: 39.9 % (ref 40–53)
HGB BLD-MCNC: 12.9 G/DL (ref 13.3–17.7)
IMM GRANULOCYTES # BLD: 0 10E9/L (ref 0–0.4)
IMM GRANULOCYTES NFR BLD: 0.2 %
LYMPHOCYTES # BLD AUTO: 1.6 10E9/L (ref 0.8–5.3)
LYMPHOCYTES NFR BLD AUTO: 29.5 %
MAGNESIUM SERPL-MCNC: 1.9 MG/DL (ref 1.6–2.3)
MCH RBC QN AUTO: 30.9 PG (ref 26.5–33)
MCHC RBC AUTO-ENTMCNC: 32.3 G/DL (ref 31.5–36.5)
MCV RBC AUTO: 96 FL (ref 78–100)
MONOCYTES # BLD AUTO: 0.6 10E9/L (ref 0–1.3)
MONOCYTES NFR BLD AUTO: 10.1 %
NEUTROPHILS # BLD AUTO: 3 10E9/L (ref 1.6–8.3)
NEUTROPHILS NFR BLD AUTO: 54.2 %
NRBC # BLD AUTO: 0 10*3/UL
NRBC BLD AUTO-RTO: 0 /100
PHOSPHATE SERPL-MCNC: 3.1 MG/DL (ref 2.5–4.5)
PLATELET # BLD AUTO: 129 10E9/L (ref 150–450)
POTASSIUM SERPL-SCNC: 3.6 MMOL/L (ref 3.4–5.3)
PROT SERPL-MCNC: 7 G/DL (ref 6.8–8.8)
RBC # BLD AUTO: 4.17 10E12/L (ref 4.4–5.9)
SODIUM SERPL-SCNC: 144 MMOL/L (ref 133–144)
TRIGL SERPL-MCNC: 90 MG/DL
WBC # BLD AUTO: 5.5 10E9/L (ref 4–11)

## 2018-07-05 PROCEDURE — 84134 ASSAY OF PREALBUMIN: CPT | Performed by: INTERNAL MEDICINE

## 2018-07-05 PROCEDURE — 84478 ASSAY OF TRIGLYCERIDES: CPT | Performed by: INTERNAL MEDICINE

## 2018-07-05 PROCEDURE — 83735 ASSAY OF MAGNESIUM: CPT | Performed by: INTERNAL MEDICINE

## 2018-07-05 PROCEDURE — 85025 COMPLETE CBC W/AUTO DIFF WBC: CPT | Performed by: INTERNAL MEDICINE

## 2018-07-05 PROCEDURE — 80053 COMPREHEN METABOLIC PANEL: CPT | Performed by: INTERNAL MEDICINE

## 2018-07-05 PROCEDURE — 84100 ASSAY OF PHOSPHORUS: CPT | Performed by: INTERNAL MEDICINE

## 2018-07-06 ENCOUNTER — HOME INFUSION (PRE-WILLOW HOME INFUSION) (OUTPATIENT)
Dept: PHARMACY | Facility: CLINIC | Age: 48
End: 2018-07-06

## 2018-07-06 LAB — PREALB SERPL IA-MCNC: 27 MG/DL (ref 15–45)

## 2018-07-08 ENCOUNTER — HOME INFUSION (PRE-WILLOW HOME INFUSION) (OUTPATIENT)
Dept: PHARMACY | Facility: CLINIC | Age: 48
End: 2018-07-08

## 2018-07-09 ENCOUNTER — HOME INFUSION (PRE-WILLOW HOME INFUSION) (OUTPATIENT)
Dept: PHARMACY | Facility: CLINIC | Age: 48
End: 2018-07-09

## 2018-07-09 NOTE — PROGRESS NOTES
This is a recent snapshot of the patient's [facility] medical record.  For current drug dose and complete information and questions, call [phone] or In Basket pool, fv home infusion ([id number])  CSN Number:  [id number]

## 2018-07-10 NOTE — PROGRESS NOTES
This is a recent snapshot of the patient's [facility] medical record.  For current drug dose and complete information and questions, call [phone] or In Basket pool, fv home infusion ([id number])  CSN Number:  [id number]

## 2018-07-12 ENCOUNTER — HOME INFUSION (PRE-WILLOW HOME INFUSION) (OUTPATIENT)
Dept: PHARMACY | Facility: CLINIC | Age: 48
End: 2018-07-12

## 2018-07-12 ENCOUNTER — TELEPHONE (OUTPATIENT)
Dept: SURGERY | Facility: CLINIC | Age: 48
End: 2018-07-12

## 2018-07-12 NOTE — PROGRESS NOTES
This is a recent snapshot of the patient's [facility] medical record.  For current drug dose and complete information and questions, call [phone] or In Basket pool, fv home infusion ([id number])  CSN Number:  [id number]

## 2018-07-15 ENCOUNTER — HOME INFUSION (PRE-WILLOW HOME INFUSION) (OUTPATIENT)
Dept: PHARMACY | Facility: CLINIC | Age: 48
End: 2018-07-15

## 2018-07-16 NOTE — PROGRESS NOTES
This is a recent snapshot of the patient's [facility] medical record.  For current drug dose and complete information and questions, call [phone] or In Basket pool, fv home infusion ([id number])  CSN Number:  [id number]

## 2018-07-16 NOTE — PROGRESS NOTES
This is a recent snapshot of the patient's [facility] medical record.  For current drug dose and complete information and questions, call [phone] or In Basket pool, fv home infusion ([id number])  CSN Number:  [id number]

## 2018-07-18 NOTE — PROGRESS NOTES
"  SUBJECTIVE:   Parker Acevedo Sr is a 47 year old male who presents to clinic today for the following health issues:  {Provider please address medication reconciliation discrepancies--rooming staff please delete if no med/rec issues}    HPI  {additional problems for roomer to add, delete if none:397264}  Problem list and histories reviewed & adjusted, as indicated.  Additional history: {NONE - AS DOCUMENTED:771917::\"as documented\"}    {ACUTE Problem SUPERLIST - brief histories:758551}    {HIST REVIEW/ LINKS 2:901049}    {PROVIDER CHARTING PREFERENCE:025423}  "

## 2018-07-19 ENCOUNTER — HOME INFUSION (PRE-WILLOW HOME INFUSION) (OUTPATIENT)
Dept: PHARMACY | Facility: CLINIC | Age: 48
End: 2018-07-19

## 2018-07-19 ENCOUNTER — PATIENT OUTREACH (OUTPATIENT)
Dept: CARE COORDINATION | Facility: CLINIC | Age: 48
End: 2018-07-19

## 2018-07-19 ENCOUNTER — HOSPITAL ENCOUNTER (OUTPATIENT)
Dept: LAB | Facility: CLINIC | Age: 48
Discharge: HOME | End: 2018-07-19
Attending: INTERNAL MEDICINE | Admitting: INTERNAL MEDICINE
Payer: COMMERCIAL

## 2018-07-19 LAB
ALBUMIN SERPL-MCNC: 3.4 G/DL (ref 3.4–5)
ALP SERPL-CCNC: 81 U/L (ref 40–150)
ALT SERPL W P-5'-P-CCNC: 18 U/L (ref 0–70)
ANION GAP SERPL CALCULATED.3IONS-SCNC: 9 MMOL/L (ref 3–14)
AST SERPL W P-5'-P-CCNC: 10 U/L (ref 0–45)
BASOPHILS # BLD AUTO: 0 10E9/L (ref 0–0.2)
BASOPHILS NFR BLD AUTO: 0.7 %
BILIRUB DIRECT SERPL-MCNC: <0.1 MG/DL (ref 0–0.2)
BILIRUB SERPL-MCNC: 0.4 MG/DL (ref 0.2–1.3)
BUN SERPL-MCNC: 12 MG/DL (ref 7–30)
CALCIUM SERPL-MCNC: 8 MG/DL (ref 8.5–10.1)
CHLORIDE SERPL-SCNC: 110 MMOL/L (ref 94–109)
CO2 SERPL-SCNC: 27 MMOL/L (ref 20–32)
CREAT SERPL-MCNC: 0.7 MG/DL (ref 0.66–1.25)
DIFFERENTIAL METHOD BLD: ABNORMAL
EOSINOPHIL NFR BLD AUTO: 4.7 %
ERYTHROCYTE [DISTWIDTH] IN BLOOD BY AUTOMATED COUNT: 13.2 % (ref 10–15)
GFR SERPL CREATININE-BSD FRML MDRD: >90 ML/MIN/1.7M2
GLUCOSE SERPL-MCNC: 83 MG/DL (ref 70–99)
HCT VFR BLD AUTO: 38.6 % (ref 40–53)
HGB BLD-MCNC: 12.2 G/DL (ref 13.3–17.7)
IMM GRANULOCYTES # BLD: 0 10E9/L (ref 0–0.4)
IMM GRANULOCYTES NFR BLD: 0.2 %
LYMPHOCYTES # BLD AUTO: 1.5 10E9/L (ref 0.8–5.3)
LYMPHOCYTES NFR BLD AUTO: 26.7 %
MAGNESIUM SERPL-MCNC: 2 MG/DL (ref 1.6–2.3)
MCH RBC QN AUTO: 30.7 PG (ref 26.5–33)
MCHC RBC AUTO-ENTMCNC: 31.6 G/DL (ref 31.5–36.5)
MCV RBC AUTO: 97 FL (ref 78–100)
MONOCYTES # BLD AUTO: 0.7 10E9/L (ref 0–1.3)
MONOCYTES NFR BLD AUTO: 11.7 %
NEUTROPHILS # BLD AUTO: 3.1 10E9/L (ref 1.6–8.3)
NEUTROPHILS NFR BLD AUTO: 56 %
NRBC # BLD AUTO: 0 10*3/UL
NRBC BLD AUTO-RTO: 0 /100
PHOSPHATE SERPL-MCNC: 3.5 MG/DL (ref 2.5–4.5)
PLATELET # BLD AUTO: 152 10E9/L (ref 150–450)
POTASSIUM SERPL-SCNC: 3.7 MMOL/L (ref 3.4–5.3)
PROT SERPL-MCNC: 6.8 G/DL (ref 6.8–8.8)
RBC # BLD AUTO: 3.97 10E12/L (ref 4.4–5.9)
SODIUM SERPL-SCNC: 146 MMOL/L (ref 133–144)
TRIGL SERPL-MCNC: 53 MG/DL
WBC # BLD AUTO: 5.6 10E9/L (ref 4–11)

## 2018-07-19 PROCEDURE — 85025 COMPLETE CBC W/AUTO DIFF WBC: CPT | Performed by: INTERNAL MEDICINE

## 2018-07-19 PROCEDURE — 84134 ASSAY OF PREALBUMIN: CPT | Performed by: INTERNAL MEDICINE

## 2018-07-19 PROCEDURE — 84100 ASSAY OF PHOSPHORUS: CPT | Performed by: INTERNAL MEDICINE

## 2018-07-19 PROCEDURE — 80053 COMPREHEN METABOLIC PANEL: CPT | Performed by: INTERNAL MEDICINE

## 2018-07-19 PROCEDURE — 84478 ASSAY OF TRIGLYCERIDES: CPT | Performed by: INTERNAL MEDICINE

## 2018-07-19 PROCEDURE — 82248 BILIRUBIN DIRECT: CPT | Performed by: INTERNAL MEDICINE

## 2018-07-19 PROCEDURE — 83735 ASSAY OF MAGNESIUM: CPT | Performed by: INTERNAL MEDICINE

## 2018-07-19 NOTE — PROGRESS NOTES
Clinic Care Coordination Contact  Care Team Conversation    RNCC received a phone call from patient's spouse Rose requesting assistance with scheduling a port placement under anesthesia for patient.  Rose states they received the okay from Dr. Thompson.    RNCC will work on this over the next few days for patient/spouse.    Melissa Behl BSN, RN, PHN  [facility] Care Coordinator  [phone]

## 2018-07-20 ENCOUNTER — TELEPHONE (OUTPATIENT)
Dept: CARE COORDINATION | Facility: CLINIC | Age: 48
End: 2018-07-20

## 2018-07-20 ENCOUNTER — HOME INFUSION (PRE-WILLOW HOME INFUSION) (OUTPATIENT)
Dept: PHARMACY | Facility: CLINIC | Age: 48
End: 2018-07-20

## 2018-07-20 DIAGNOSIS — Z45.2 ENCOUNTER FOR CARE RELATED TO VASCULAR ACCESS PORT: Primary | ICD-10-CM

## 2018-07-20 DIAGNOSIS — Z98.84 S/P BARIATRIC SURGERY: ICD-10-CM

## 2018-07-20 DIAGNOSIS — Z78.9 ON TOTAL PARENTERAL NUTRITION: ICD-10-CM

## 2018-07-20 LAB — PREALB SERPL IA-MCNC: 21 MG/DL (ref 15–45)

## 2018-07-20 NOTE — PROGRESS NOTES
This is a recent snapshot of the patient's [facility] medical record.  For current drug dose and complete information and questions, call [phone] or In Basket pool, fv home infusion ([id number])  CSN Number:  [id number]

## 2018-07-20 NOTE — TELEPHONE ENCOUNTER
RNCC received a call from patient's spouse requesting port placement to be ordered under anesthesia.  Per 7/5/18 Epic MyChart message :    Parker,     All your labs look good, so I am not sure what to make of the temps. Even your CRP which is a very sensitive signs of inflammation was normal. It would be very unusual to have this normal with a blood stream infection.   I would have you go see your primary care physician on this.  From my standpoint, if the person who manages your TPN is okay with the port going back in, it is okay.  However, because they are the ones ordering infusions for the line, I think it is more appropriate that they arrange for it to be placed. Let us know if you have any questions.     Thanks,   Gabbi Thompson     Patient's spouse contacted Dr. Vyas for port placement order and received this response in 7/18/18 [employer] encounter:    Parker -     Your primary care can help you get this arranged, Dr. Vyas does not put those devices in.     Thank you,   matt Abarca read by Parker Acevedo Sr at 12:12 PM on 7/19/2018.     Note patient has a future appointment with PCP on 7/24/18.  Please advise.    Melissa Behl BSN, RN, N  [facility] Care Coordinator  [phone]

## 2018-07-20 NOTE — PROGRESS NOTES
Clinic Care Coordination Contact  Care Team Conversations    RNCC reviewed chart and noted MyChart messages from infectious disease and bariatric surgery advising patient to contact PCP for port placement under anesthesia.  Message sent to PCP in 7/20/18 telephone encounter.    Melissa Behl BSN, RN, PHN  [facility] Care Coordinator  [phone]

## 2018-07-23 ENCOUNTER — HOME INFUSION (PRE-WILLOW HOME INFUSION) (OUTPATIENT)
Dept: PHARMACY | Facility: CLINIC | Age: 48
End: 2018-07-23

## 2018-07-23 NOTE — PROGRESS NOTES
Clinic Care Coordination Contact    Situation: Patient chart reviewed by care coordinator.    Background: RNCC reviewing chart for port placement/telephone encounter from 7/20/18.    Assessment: Patient is scheduled for port placement consultation 7/25/18.    Plan/Recommendations: RNCC will follow up with patient in 1 month.    Melissa Behl BSN, RN, PHN  [facility] Care Coordinator  [phone]

## 2018-07-23 NOTE — TELEPHONE ENCOUNTER
Orders in place for port placement.    When scheduled, should relay the fact that the patient wishes anesthesia, presumably light general anesthesia. The radiologist will need to advise any preoperative preparation.    Esteban Daly MD

## 2018-07-23 NOTE — TELEPHONE ENCOUNTER
Left detailed message asking IR staff to call pt to schedule the procedure and cancel the consult with Dr Whiting.

## 2018-07-23 NOTE — TELEPHONE ENCOUNTER
[facility], Interventional Radiology- [phone]    Pt scheduled and informed of appt details

## 2018-07-23 NOTE — PROGRESS NOTES
This is a recent snapshot of the patient's [facility] medical record.  For current drug dose and complete information and questions, call [phone] or In Basket pool, fv home infusion ([id number])  CSN Number:  [id number]

## 2018-07-23 NOTE — TELEPHONE ENCOUNTER
Please call to assist in scheduling an appointment for the patient with Interventional Radiology at the [facility].  An order is in place with a contact number.    Reason for appointment:    Patient needs a port placement for long-term TPN infusions following complications from gastric bypass surgery.     Would suggest a pre procedure appointment to review with the radiologist.    Please inform the patient of the details.    Esteban Daly MD

## 2018-07-23 NOTE — TELEPHONE ENCOUNTER
Pt does not need the consultation.  [facility] requests Provider re write the order for the port placement to have the procedure only, no consult.  Will have to call [facility] to reschedule.

## 2018-07-25 NOTE — PROGRESS NOTES
This is a recent snapshot of the patient's [facility] medical record.  For current drug dose and complete information and questions, call [phone] or In [facility] pool, fv home infusion ([id number])  CSN Number:  [id number]

## 2018-07-26 ENCOUNTER — HOME INFUSION (PRE-WILLOW HOME INFUSION) (OUTPATIENT)
Dept: PHARMACY | Facility: CLINIC | Age: 48
End: 2018-07-26

## 2018-07-27 NOTE — PROGRESS NOTES
This is a recent snapshot of the patient's [facility] medical record.  For current drug dose and complete information and questions, call [phone] or In Basket pool, fv home infusion ([id number])  CSN Number:  [id number]

## 2018-07-29 ENCOUNTER — MYC MEDICAL ADVICE (OUTPATIENT)
Dept: PALLIATIVE MEDICINE | Facility: CLINIC | Age: 48
End: 2018-07-29

## 2018-07-29 DIAGNOSIS — Z79.891 ENCOUNTER FOR LONG-TERM OPIATE ANALGESIC USE: ICD-10-CM

## 2018-07-29 DIAGNOSIS — R10.9 CHRONIC ABDOMINAL PAIN: ICD-10-CM

## 2018-07-29 DIAGNOSIS — G89.29 CHRONIC ABDOMINAL PAIN: ICD-10-CM

## 2018-07-30 ENCOUNTER — HOME INFUSION (PRE-WILLOW HOME INFUSION) (OUTPATIENT)
Dept: PHARMACY | Facility: CLINIC | Age: 48
End: 2018-07-30

## 2018-07-30 ENCOUNTER — OFFICE VISIT (OUTPATIENT)
Dept: FAMILY MEDICINE | Facility: CLINIC | Age: 48
End: 2018-07-30
Payer: COMMERCIAL

## 2018-07-30 VITALS
RESPIRATION RATE: 16 BRPM | TEMPERATURE: 98.9 F | OXYGEN SATURATION: 99 % | DIASTOLIC BLOOD PRESSURE: 60 MMHG | BODY MASS INDEX: 26.92 KG/M2 | HEART RATE: 60 BPM | WEIGHT: 193 LBS | SYSTOLIC BLOOD PRESSURE: 108 MMHG

## 2018-07-30 DIAGNOSIS — F90.0 ADHD (ATTENTION DEFICIT HYPERACTIVITY DISORDER), INATTENTIVE TYPE: ICD-10-CM

## 2018-07-30 DIAGNOSIS — G89.29 CHRONIC ABDOMINAL PAIN: ICD-10-CM

## 2018-07-30 DIAGNOSIS — F41.9 CHRONIC ANXIETY: ICD-10-CM

## 2018-07-30 DIAGNOSIS — E53.8 VITAMIN B12 DEFICIENCY WITHOUT ANEMIA: ICD-10-CM

## 2018-07-30 DIAGNOSIS — D50.8 OTHER IRON DEFICIENCY ANEMIA: ICD-10-CM

## 2018-07-30 DIAGNOSIS — Z90.3 STATUS POST GASTRECTOMY: ICD-10-CM

## 2018-07-30 DIAGNOSIS — Z01.818 PREOP GENERAL PHYSICAL EXAM: Primary | ICD-10-CM

## 2018-07-30 DIAGNOSIS — R52 PAIN: ICD-10-CM

## 2018-07-30 DIAGNOSIS — R10.9 CHRONIC ABDOMINAL PAIN: ICD-10-CM

## 2018-07-30 DIAGNOSIS — Z11.4 SCREENING FOR HIV (HUMAN IMMUNODEFICIENCY VIRUS): ICD-10-CM

## 2018-07-30 DIAGNOSIS — E46 MALNUTRITION, UNSPECIFIED TYPE (H): ICD-10-CM

## 2018-07-30 DIAGNOSIS — E78.5 HYPERLIPIDEMIA LDL GOAL <130: ICD-10-CM

## 2018-07-30 PROBLEM — R78.81 BACTEREMIA: Status: RESOLVED | Noted: 2017-06-29 | Resolved: 2018-07-30

## 2018-07-30 PROBLEM — Z98.84 S/P BARIATRIC SURGERY: Status: ACTIVE | Noted: 2018-07-30

## 2018-07-30 PROBLEM — R78.81 E COLI BACTEREMIA: Status: RESOLVED | Noted: 2018-01-13 | Resolved: 2018-07-30

## 2018-07-30 PROBLEM — B96.20 E COLI BACTEREMIA: Status: RESOLVED | Noted: 2018-01-13 | Resolved: 2018-07-30

## 2018-07-30 PROCEDURE — 99215 OFFICE O/P EST HI 40 MIN: CPT | Performed by: FAMILY MEDICINE

## 2018-07-30 RX ORDER — FENTANYL 25 UG/1
1 PATCH TRANSDERMAL
Qty: 10 PATCH | Refills: 0 | Status: SHIPPED | OUTPATIENT
Start: 2018-07-30 | End: 2018-07-31

## 2018-07-30 RX ORDER — LIDOCAINE/PRILOCAINE 2.5 %-2.5%
CREAM (GRAM) TOPICAL PRN
Qty: 30 G | Refills: 3 | Status: ON HOLD | OUTPATIENT
Start: 2018-07-30 | End: 2019-01-04

## 2018-07-30 RX ORDER — DEXTROAMPHETAMINE SACCHARATE, AMPHETAMINE ASPARTATE, DEXTROAMPHETAMINE SULFATE AND AMPHETAMINE SULFATE 5; 5; 5; 5 MG/1; MG/1; MG/1; MG/1
20 TABLET ORAL DAILY
Qty: 30 TABLET | Refills: 0 | Status: CANCELLED | OUTPATIENT
Start: 2018-07-30

## 2018-07-30 RX ORDER — DEXTROAMPHETAMINE SACCHARATE, AMPHETAMINE ASPARTATE, DEXTROAMPHETAMINE SULFATE AND AMPHETAMINE SULFATE 5; 5; 5; 5 MG/1; MG/1; MG/1; MG/1
20 TABLET ORAL DAILY
Qty: 30 TABLET | Refills: 0 | Status: ON HOLD | OUTPATIENT
Start: 2018-10-07 | End: 2018-09-18

## 2018-07-30 RX ORDER — DEXTROAMPHETAMINE SACCHARATE, AMPHETAMINE ASPARTATE, DEXTROAMPHETAMINE SULFATE AND AMPHETAMINE SULFATE 5; 5; 5; 5 MG/1; MG/1; MG/1; MG/1
20 TABLET ORAL DAILY
Qty: 30 TABLET | Refills: 0 | Status: SHIPPED | OUTPATIENT
Start: 2018-09-07 | End: 2018-10-05

## 2018-07-30 RX ORDER — DEXTROAMPHETAMINE SACCHARATE, AMPHETAMINE ASPARTATE, DEXTROAMPHETAMINE SULFATE AND AMPHETAMINE SULFATE 5; 5; 5; 5 MG/1; MG/1; MG/1; MG/1
20 TABLET ORAL DAILY
Qty: 30 TABLET | Refills: 0 | Status: SHIPPED | OUTPATIENT
Start: 2018-08-08 | End: 2018-09-07

## 2018-07-30 RX ORDER — FENTANYL 12.5 UG/1
1 PATCH TRANSDERMAL
Qty: 15 PATCH | Refills: 0 | Status: CANCELLED | OUTPATIENT
Start: 2018-07-30

## 2018-07-30 RX ORDER — OXYCODONE HCL 5 MG/5 ML
10-15 SOLUTION, ORAL ORAL EVERY 4 HOURS PRN
Qty: 1500 ML | Refills: 0 | Status: SHIPPED | OUTPATIENT
Start: 2018-07-30 | End: 2018-07-31

## 2018-07-30 ASSESSMENT — PATIENT HEALTH QUESTIONNAIRE - PHQ9
SUM OF ALL RESPONSES TO PHQ QUESTIONS 1-9: 2
10. IF YOU CHECKED OFF ANY PROBLEMS, HOW DIFFICULT HAVE THESE PROBLEMS MADE IT FOR YOU TO DO YOUR WORK, TAKE CARE OF THINGS AT HOME, OR GET ALONG WITH OTHER PEOPLE: NOT DIFFICULT AT ALL
SUM OF ALL RESPONSES TO PHQ QUESTIONS 1-9: 2
SUM OF ALL RESPONSES TO PHQ QUESTIONS 1-9: 2

## 2018-07-30 ASSESSMENT — ANXIETY QUESTIONNAIRES
7. FEELING AFRAID AS IF SOMETHING AWFUL MIGHT HAPPEN: NOT AT ALL
2. NOT BEING ABLE TO STOP OR CONTROL WORRYING: NOT AT ALL
1. FEELING NERVOUS, ANXIOUS, OR ON EDGE: NOT AT ALL
4. TROUBLE RELAXING: SEVERAL DAYS
2. NOT BEING ABLE TO STOP OR CONTROL WORRYING: NOT AT ALL
6. BECOMING EASILY ANNOYED OR IRRITABLE: NOT AT ALL
GAD7 TOTAL SCORE: 2
6. BECOMING EASILY ANNOYED OR IRRITABLE: NOT AT ALL
1. FEELING NERVOUS, ANXIOUS, OR ON EDGE: NOT AT ALL
GAD7 TOTAL SCORE: 2
3. WORRYING TOO MUCH ABOUT DIFFERENT THINGS: NOT AT ALL
7. FEELING AFRAID AS IF SOMETHING AWFUL MIGHT HAPPEN: NOT AT ALL
5. BEING SO RESTLESS THAT IT IS HARD TO SIT STILL: SEVERAL DAYS
5. BEING SO RESTLESS THAT IT IS HARD TO SIT STILL: SEVERAL DAYS
3. WORRYING TOO MUCH ABOUT DIFFERENT THINGS: NOT AT ALL
4. TROUBLE RELAXING: SEVERAL DAYS
7. FEELING AFRAID AS IF SOMETHING AWFUL MIGHT HAPPEN: NOT AT ALL

## 2018-07-30 ASSESSMENT — PAIN SCALES - GENERAL: PAINLEVEL: MODERATE PAIN (5)

## 2018-07-30 NOTE — TELEPHONE ENCOUNTER
Received call from patient requesting refill(s) of oxyCODONE (ROXICODONE) 5 MG/5ML solution and fentaNYL (DURAGESIC) 12 mcg/hr 72 hr patch    Last picked up from pharmacy on 7/20/18    Pt last seen by prescribing provider on 6/27/18  Next appt scheduled for 9/19/18     checked in the past 6 months? Yes If no, print current report and give to RN    Last urine drug screen date 4/25/18  Current opioid agreement on file (completed within the last year) Yes Date of opioid agreement: 12/7/17    Processing (pick one and delete the others):  No detail left pt phone not working    Corky Chatman MA  [facility]    Will route to nursing pool for review and preparation of prescription(s).

## 2018-07-30 NOTE — MR AVS SNAPSHOT
After Visit Summary   7/30/2018    Parker Acevedo Sr    MRN: [id number]           Patient Information     Date Of Birth          1970        Visit Information        Provider Department      7/30/2018 11:40 AM Esteban Daly MD [facility]        Today's Diagnoses     Preop general physical exam    -  1    Malnutrition, unspecified type (H)        Status post gastrectomy        ADHD (attention deficit hyperactivity disorder), inattentive type        Screening for HIV (human immunodeficiency virus)        Chronic abdominal pain        Vitamin B12 deficiency without anemia        Chronic anxiety        Other iron deficiency anemia        Hyperlipidemia LDL goal <130        Screening for human immunodeficiency virus        Pain          Care Instructions      Before Your Surgery      Call your surgeon if there is any change in your health. This includes signs of a cold or flu (such as a sore throat, runny nose, cough, rash or fever).    Do not smoke, drink alcohol or take over the counter medicine (unless your surgeon or primary care doctor tells you to) for the 24 hours before and after surgery.    If you take prescribed drugs: Follow your doctor s orders about which medicines to take and which to stop until after surgery.    Eating and drinking prior to surgery: follow the instructions from your surgeon    Take a shower or bath the night before surgery. Use the soap your surgeon gave you to gently clean your skin. If you do not have soap from your surgeon, use your regular soap. Do not shave or scrub the surgery site.  Wear clean pajamas and have clean sheets on your bed.     These are new changes to your current plan of care based on today's visit:    Medications stopped    Medications to be started    Change dose of this medication None   New treatments        Follow up appointments:    1.  FOLLOW UP WITH YOUR PRIMARY CARE PROVIDER: 3 month(s) for clinic visit    2.  FOLLOW UP WITH SPECIALIST: As planned    3. FOLLOW UP WITH NURSE VISIT:     4. IMAGING STUDIES TO BE SCHEDULED:     5. PROCEDURES TO BE SCHEDULED:     6. LABS TO BE COMPLETED PRIOR TO NEXT VISIT: lipid profile with HIV screening with the next blood draw    Esteban Daly MD                Follow-ups after your visit        Follow-up notes from your care team     Return in about 3 months (around 10/30/2018) for Routine Visit.      Your next 10 appointments already scheduled     Aug 02, 2018  7:15 AM CDT   (Arrive by 5:45 AM)   IR CHEST PORT PLACEMENT > 5 YRS OF AGE with [id number]   [facility] ([facility])    [address]  5th Floor  [facility] [address] [phone]           1. Your doctor will need to do a history and physical within 7 days before this procedure. 2. Your doctor will which medications should not be taken the morning of the exam. 3. Laboratory tests are to be obtained by your doctor prior to the exam (Basic Metabolic Panel, CBCP, PTT and INR) (No labs needed if you are having a tunneled catheter exchange or removal) 4. If you have allergies to x-ray contrast or iodine, contact your doctor or a Radiology nurse prior to the exam day for instructions. 5. Someone will need to drive you to and from the hospital. 6. If you are or may be pregnant, contact your doctor or a Radiology nurse prior to the day of the exam. 7. If you have diabetes, check with your doctor or a Radiology nurse to see if your insulin needs to be adjusted for the exam. 8. If you are taking a medication called Glucophage or Glucovance; these medications need to be held the day of the exam and for approximately 48 hours following. A blood sample must be drawn so your creatinine level can be checked before resuming this medication. 9. If you are taking Coumadin (to thin you blood) please contact your doctor or a Radiology nurse at least 3 days before the exam for special instructions.  10. You should not have received contrast within 48 hours of this exam. 11. The day before your exam you may eat your regular diet and are encouraged to drink at least 2 quarts of clear liquids. Drink no alcoholic beverages for 24 hours prior to the exam. 12. If you have a colostomy you will need to irrigate it with tap water at 8PM the evening before and again at 6AM the morning of the exam. 13. Do not smoke for 24 hours prior to the procedure. 14. Birth to 4 years: - Breast feeding must be stopped 4 hours prior to exam - Solid food or formula must be stopped 6 hours prior to exam - Tube feedings must be stopped 6 hours prior to exam 15. 4-10 years old: - Nothing to eat or drink 6 hours prior to exam 16. 10+ years old: - Nothing to eat or drink 8 hours prior to exam 17. The morning of the exam you may brush your teeth and take medications as directed with a sip of water. 18. When discharged, you cannot drive until morning, and an adult must be with you until then. You should stay in the [facility] overnight. 19. Bring a list of all drugs you are taking; include supplements and over-the-counter medications. Wear comfortable clothes and leave your valuables at home.            Aug 02, 2018   Procedure with Guy Jamil PA-C   [facility] ([facility])    [address] [place] [address]  5th [facility] [address] [phone]           Located in the Clinics and Surgery Center at [address] [place] [address].   parking is very convenient and highly recommended.  is a $6 flat rate fee.  Both  and self parkers should enter the main arrival plaza from [facility]; parking attendants will direct you based on your parking preference.            Sep 19, 2018  1:00 PM CDT   Return Visit with Patti Milligan MD   [facility] ([facility])    [address] [facility] [address]    [phone]              Future tests that were ordered for you today     Open Future Orders        Priority Expected Expires Ordered    HIV Screening Routine 8/9/2018 8/17/2018 7/30/2018    Lipid panel reflex to direct LDL Fasting Routine 8/9/2018 8/17/2018 7/30/2018            Who to contact     If you have questions or need follow up information about today's clinic visit or your schedule please contact [facility] directly at [phone].  Normal or non-critical lab and imaging results will be communicated to you by SafetyCulturehart, letter or phone within 4 business days after the clinic has received the results. If you do not hear from us within 7 days, please contact the clinic through [employer] Shadow[employer] or phone. If you have a critical or abnormal lab result, we will notify you by phone as soon as possible.  Submit refill requests through [facility] or call your pharmacy and they will forward the refill request to us. Please allow 3 business days for your refill to be completed.          Additional Information About Your Visit        [facility] Information     [facility] gives you secure access to your electronic health record. If you see a primary care provider, you can also send messages to your care team and make appointments. If you have questions, please call your primary care clinic.  If you do not have a primary care provider, please call [phone] and they will assist you.        Care EveryWhere ID     This is your Care EveryWhere ID. This could be used by other organizations to access your [place] medical records  [id number]        Your Vitals Were     Pulse Temperature Respirations Pulse Oximetry BMI (Body Mass Index)       60 98.9  F (37.2  C) (Temporal) 16 99% 26.92 kg/m2        Blood Pressure from Last 3 Encounters:   07/30/18 108/60   06/27/18 146/82   06/21/18 145/60    Weight from Last 3 Encounters:   07/30/18 193 lb (87.5 kg)   06/27/18 193 lb (87.5 kg)   06/21/18 195 lb 6.4 oz (88.6 kg)                  Today's Medication Changes          These changes are accurate as of 7/30/18 12:46 PM.  If you have any questions, ask your nurse or doctor.               Start taking these medicines.        Dose/Directions    lidocaine-prilocaine cream   Commonly known as:  EMLA   Used for:  Pain   Started by:  Esteban Daly MD        Apply topically as needed for moderate pain   Quantity:  30 g   Refills:  3         These medicines have changed or have updated prescriptions.        Dose/Directions    * amphetamine-dextroamphetamine 20 MG per tablet   Commonly known as:  ADDERALL   This may have changed:  These instructions start on 8/8/2018. If you are unsure what to do until then, ask your doctor or other care provider.   Used for:  ADHD (attention deficit hyperactivity disorder), inattentive type   Changed by:  Esteban Daly MD        Dose:  20 mg   Start taking on:  8/8/2018   Take 1 tablet (20 mg) by mouth daily   Quantity:  30 tablet   Refills:  0       * amphetamine-dextroamphetamine 20 MG per tablet   Commonly known as:  ADDERALL   This may have changed:  You were already taking a medication with the same name, and this prescription was added. Make sure you understand how and when to take each.   Used for:  ADHD (attention deficit hyperactivity disorder), inattentive type   Changed by:  Esteban Daly MD        Dose:  20 mg   Start taking on:  9/7/2018   Take 1 tablet (20 mg) by mouth daily   Quantity:  30 tablet   Refills:  0       * amphetamine-dextroamphetamine 20 MG per tablet   Commonly known as:  ADDERALL   This may have changed:  You were already taking a medication with the same name, and this prescription was added. Make sure you understand how and when to take each.   Used for:  ADHD (attention deficit hyperactivity disorder), inattentive type   Changed by:  Esteban Daly MD        Dose:  20 mg   Start taking on:  10/7/2018   Take 1 tablet (20 mg) by mouth daily   Quantity:  30  tablet   Refills:  0       sucralfate 1 GM/10ML suspension   Commonly known as:  CARAFATE   This may have changed:    - when to take this  - reasons to take this   Used for:  Nausea        Dose:  1 g   Take 10 mLs (1 g) by mouth 4 times daily   Quantity:  1200 mL   Refills:  11       vitamin D 55748 UNIT capsule   Commonly known as:  ERGOCALCIFEROL   This may have changed:  additional instructions   Used for:  Vitamin D deficiency        Dose:  63067 Units   Take 1 capsule (50,000 Units) by mouth every 7 days   Quantity:  12 capsule   Refills:  3       * Notice:  This list has 3 medication(s) that are the same as other medications prescribed for you. Read the directions carefully, and ask your doctor or other care provider to review them with you.         Where to get your medicines      These medications were sent to [facility] [address] [address]     Phone:  [phone]     lidocaine-prilocaine cream         Some of these will need a paper prescription and others can be bought over the counter.  Ask your nurse if you have questions.     Bring a paper prescription for each of these medications     amphetamine-dextroamphetamine 20 MG per tablet    amphetamine-dextroamphetamine 20 MG per tablet    amphetamine-dextroamphetamine 20 MG per tablet                Primary Care Provider Office Phone # Fax #    Esteban Daly [phone] [address] [facility] [address]        Equal Access to Services     KATHRYN GUZMAN AH: Hadii kameron fregosoo Soheike, waaxda luqadaha, qaybta kaalmada adekeoda, waxay eligio dumont . So [facility] [phone].    ATENCIÓN: Si habla español, tiene a hicks disposición servicios gratuitos de asistencia lingüística. Chu al [phone].    We comply with applicable federal civil rights laws and [place] laws. We do not discriminate on the basis of race, color, national origin, age,  disability, sex, sexual orientation, or gender identity.            Thank you!     Thank you for choosing [facility]  for your care. Our goal is always to provide you with excellent care. Hearing back from our patients is one way we can continue to improve our services. Please take a few minutes to complete the written survey that you may receive in the mail after your visit with us. Thank you!             Your Updated Medication List - Protect others around you: Learn how to safely use, store and throw away your medicines at www.disposemymeds.org.          This list is accurate as of 7/30/18 12:46 PM.  Always use your most recent med list.                   Brand Name Dispense Instructions for use Diagnosis    acetaminophen 500 MG tablet    TYLENOL     Take 1,000 mg by mouth every 6 hours as needed for fever        albuterol 108 (90 Base) MCG/ACT Inhaler    PROAIR HFA/PROVENTIL HFA/VENTOLIN HFA    1 Inhaler    Inhale 2 puffs into the lungs every 4 hours as needed for shortness of breath / dyspnea or wheezing    Productive cough       * amphetamine-dextroamphetamine 20 MG per tablet   Start taking on:  8/8/2018    ADDERALL    30 tablet    Take 1 tablet (20 mg) by mouth daily    ADHD (attention deficit hyperactivity disorder), inattentive type       * amphetamine-dextroamphetamine 20 MG per tablet   Start taking on:  9/7/2018    ADDERALL    30 tablet    Take 1 tablet (20 mg) by mouth daily    ADHD (attention deficit hyperactivity disorder), inattentive type       * amphetamine-dextroamphetamine 20 MG per tablet   Start taking on:  10/7/2018    ADDERALL    30 tablet    Take 1 tablet (20 mg) by mouth daily    ADHD (attention deficit hyperactivity disorder), inattentive type       COREG 12.5 MG tablet   Generic drug:  carvedilol      Take 12.5 mg by mouth 2 times daily (with meals)        cyanocobalamin 1000 MCG/ML injection    VITAMIN B12    1 mL    Inject 1 mL (1,000 mcg) into the muscle every 30 days     "Bariatric surgery status       [facility]      Contact [facility] for patient specific medication information at [phone] on admission and discharge from the hospital.  Phones are answered 24 hours a day 7 days a week 365 days a year.  Providers - Choose \"CONTINUE HOME MED (no script)\" at discharge if patient treatment with home infusion will continue.    S/P bariatric surgery       * fentaNYL 12 mcg/hr 72 hr patch    DURAGESIC    10 patch    Place 1 patch onto the skin every 48 hours . Remove old patch.  Fill on/after 7/18/18 to start on/after 7/20/18. This is a 20 day supply to match up scripts.  Early release due to patches falling off.    Chronic abdominal pain, Encounter for long-term opiate analgesic use       * fentaNYL 25 mcg/hr 72 hr patch    DURAGESIC    10 patch    Place 1 patch onto the skin every 48 hours remove old patch.  Release on/after 7/18/18 to start on/after 7/20/18. This is a 20 day supply to match up scripts. Early release due to patches falling off.    Chronic abdominal pain, Encounter for long-term opiate analgesic use       lidocaine 5 % Patch    LIDODERM    60 patch    Place 1-2 patches onto the skin every 24 hours Wear for 12 hours, remove for 12 hours.  OK to cut to better fit to size.    Chronic bilateral low back pain without sciatica, Chronic abdominal pain, Myofascial pain       lidocaine-prilocaine cream    EMLA    30 g    Apply topically as needed for moderate pain    Pain       naloxone nasal spray    NARCAN    0.2 mL    Spray 1 spray (4 mg) into one nostril alternating nostrils as needed for opioid reversal (every 2-3 minutes until assistance arrives.)    Encounter for long-term opiate analgesic use, Chronic abdominal pain       Needle (Disp) 27G X 1/2\" Misc    BD DISP NEEDLES    3 each    1 Device every 30 days Use for cyanocobalamin injection once q 30 days.    Bariatric surgery status       ondansetron 8 MG ODT tab    ZOFRAN-ODT    " 90 tablet    Take 1 tablet (8 mg) by mouth every 8 hours as needed for nausea    Nausea       * order for DME     12 each    Injection Supplies for Vitamin B12: 3cc syringes w/ 27 gauge needles, 1/2 inch length    Bariatric surgery status       * order for DME     4 each    Equipment being ordered: Bilateral knee high chronic venous insufficiency stockings--  mild-moderate pressures.    Bilateral edema of lower extremity       oxyCODONE 5 MG/5ML solution    ROXICODONE    1650 mL    Take 10-15 mLs (10-15 mg) by mouth every 4 hours as needed for moderate to severe pain Max of 55 mg/day this month. Wean as able Fill on/after 7/9/18 not to start untill 7/11/18.    Encounter for long-term opiate analgesic use, Chronic abdominal pain       parenteral nutrition - PTA/DISCHARGE ORDER      [facility] to resume previous home TPN but run x 24 hours the first day until labs are ok. Then cycle x 14 hours per previous home regimen. He has been off TPN x 5 days here.        sucralfate 1 GM/10ML suspension    CARAFATE    1200 mL    Take 10 mLs (1 g) by mouth 4 times daily    Nausea       vitamin D 63928 UNIT capsule    ERGOCALCIFEROL    12 capsule    Take 1 capsule (50,000 Units) by mouth every 7 days    Vitamin D deficiency       * Notice:  This list has 7 medication(s) that are the same as other medications prescribed for you. Read the directions carefully, and ask your doctor or other care provider to review them with you.

## 2018-07-30 NOTE — TELEPHONE ENCOUNTER
Per plan:  Plan:   1. Discussed again risks with opioid dosing, including fentanyl patches and fever.  New script for narcan also given.  2. Next script of fentanyl will be to 25mcg/hr.  3. This month, will try to decrease the oxycodone to 45-50mg/day. Next month, will stay at 50mg/day of oxycodone while coming down on the patch. Then the next month would be 45mg/day  If calls for procedure, likely would increase by ~10mg/day of oxycodone and keep other meds the same.  4. Follow up in 3 months.    Signed Prescriptions:                        Disp   Refills    oxyCODONE (ROXICODONE) 5 MG/5ML solution   1500 mL0        Sig: Take 10-15 mLs (10-15 mg) by mouth every 4 hours as           needed for moderate to severe pain Max of 50           mg/day this month. Wean as able Fill on/after           8/7/18 not to start untill 8/9/18.  Authorizing Provider: BRANDYN JENKINS    fentaNYL (DURAGESIC) 25 mcg/hr 72 hr patch 10 pat*0        Sig: Place 1 patch onto the skin every 48 hours remove old           patch.  Release on/after 8/7/18 to start on/after           8/9/18.  Authorizing Provider: BRANDYN JENKINS MD  [facility]

## 2018-07-30 NOTE — TELEPHONE ENCOUNTER
Medication refill information reviewed.     Due date for all is 8/9/18     Prescriptions prepped for review.     Will route to provider.

## 2018-07-30 NOTE — PATIENT INSTRUCTIONS
Before Your Surgery      Call your surgeon if there is any change in your health. This includes signs of a cold or flu (such as a sore throat, runny nose, cough, rash or fever).    Do not smoke, drink alcohol or take over the counter medicine (unless your surgeon or primary care doctor tells you to) for the 24 hours before and after surgery.    If you take prescribed drugs: Follow your doctor s orders about which medicines to take and which to stop until after surgery.    Eating and drinking prior to surgery: follow the instructions from your surgeon    Take a shower or bath the night before surgery. Use the soap your surgeon gave you to gently clean your skin. If you do not have soap from your surgeon, use your regular soap. Do not shave or scrub the surgery site.  Wear clean pajamas and have clean sheets on your bed.     These are new changes to your current plan of care based on today's visit:    Medications stopped    Medications to be started    Change dose of this medication None   New treatments        Follow up appointments:    1.  FOLLOW UP WITH YOUR PRIMARY CARE PROVIDER: 3 month(s) for clinic visit    2. FOLLOW UP WITH SPECIALIST: As planned    3. FOLLOW UP WITH NURSE VISIT:     4. IMAGING STUDIES TO BE SCHEDULED:     5. PROCEDURES TO BE SCHEDULED:     6. LABS TO BE COMPLETED PRIOR TO NEXT VISIT: lipid profile with HIV screening with the next blood draw    Esteban Daly MD

## 2018-07-30 NOTE — PROGRESS NOTES
[facility] [place]  [address], [address]  [place] [address]  [phone]  Dept: [phone]    PRE-OP EVALUATION:  Today's date: 2018    Patient would like orders for a couple medications.     Parker Acevedo  (: 1970) presents for pre-operative evaluation assessment as requested by TBD.  He requires evaluation and anesthesia risk assessment prior to undergoing surgery/procedure for port vascular access under sedation.    Proposed Surgery/ Procedure: Placing a port for vascular access  Date of Surgery/ Procedure: 2018  Time of Surgery/ Procedure: 7:15  [facility]    Fax number for surgical facility:   Primary Physician: Esteban Daly  Type of Anesthesia Anticipated: to be determined    Patient has a Health Care Directive or Living Will:  YES     1. NO - Do you have a history of heart attack, stroke, stent, bypass or surgery on an artery in the head, neck, heart or legs?  2. NO - Do you ever have any pain or discomfort in your chest?  3. NO - Do you have a history of  Heart Failure?  4. NO - Are you troubled by shortness of breath when: walking on the level, up a slight hill or at night?  5. NO - Do you currently have a cold, bronchitis or other respiratory infection?  6. NO - Do you have a cough, shortness of breath or wheezing?  7. NO - Do you sometimes get pains in the calves of your legs when you walk?  8. NO - Do you or anyone in your family have previous history of blood clots?  9. NO - Do you or does anyone in your family have a serious bleeding problem such as prolonged bleeding following surgeries or cuts?  10. YES - HAVE YOU EVER HAD PROBLEMS WITH ANEMIA OR BEEN TOLD TO TAKE IRON PILLS? Once a month   11. NO - Have you had any abnormal blood loss such as black, tarry or bloody stools, or abnormal vaginal bleeding?  12. NO - Have you ever had a blood transfusion?  13. NO - Have you or any of your relatives ever had problems with  anesthesia?  14. NO - Do you have sleep apnea, excessive snoring or daytime drowsiness?  15. NO - Do you have any prosthetic heart valves?  16. NO - Do you have prosthetic joints?  17. NO - Is there any chance that you may be pregnant?      HPI:     HPI related to upcoming procedure:     Parker Acevedo 47-year-old gentleman who is seen for preoperative evaluation prior to undergoing elective placement of a port for  vascular access for ongoing need for chronic TPN.  He has had multiple surgeries for complications following bariatric surgery in the past, essentially resulting in a total gastrectomy.  He has chronic abdominal pain and inability to maintain adequate calories through oral means.  He has had previous ports placed and used without difficulty but has had recurring infections, requiring removal.  He is currently maintained with a PICC line for approximately the past month.  He is followed by infectious disease and has had negative blood cultures recently and has been cleared for port placement.  He has had no fevers or chills for multiple weeks.      See problem list for active medical problems.  Problems all longstanding and stable, except as noted/documented.  See ROS for pertinent symptoms related to these conditions.                                                                                                                                                          .    MEDICAL HISTORY:     Patient Active Problem List    Diagnosis Date Noted     S/P bariatric surgery 07/30/2018     Priority: Medium     Generalized weakness 01/30/2018     Priority: Medium     Catheter-related bloodstream infection (CRBSI) 09/23/2017     Priority: Medium     Low serum iron 09/08/2017     Priority: Medium     Anemia, iron deficiency 09/08/2017     Priority: Medium     Abnormal echocardiogram 04/11/2017     Priority: Medium     Port or reservoir infection, initial encounter 03/16/2017     Priority: Medium     Status post  cervical spinal arthrodesis 02/15/2017     Priority: Medium     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)      Priority: Medium     mild EF ~45% on rest 2/13/17, improves with stressing       Short gut syndrome      Priority: Medium     Anxiety      Priority: Medium     Gastrostomy tube in place (H) 08/09/2016     Priority: Medium     Chronic nausea 05/10/2016     Priority: Medium     Fungemia 04/11/2016     Priority: Medium     Positive blood culture 03/29/2016     Priority: Medium     Insomnia 08/13/2015     Priority: Medium     Chronic pain 07/07/2015     Priority: Medium     Patient is followed by Dr. Milligan for ongoing prescription of pain medication.  All refills should be approved by this provider, or covering partner.    Medication(s): Fentanyl 50 mcg patches every three days/ Liquid oxycodone 5 mg/5ml up to 50 mg daily.   Maximum quantity per month: as per Dr. Milligan through [facility]  Clinic visit frequency required: Q 3 months at [facility]    Controlled substance agreement on file: Yes       Date(s): Through Pain Management    Pain Clinic evaluation in the past: Yes       Date(s):  Ongoing every three months       Location(s):  Per pain management    DIRE Total Score(s):  No flowsheet data found.    Last [facility] website verification:  Through [facility]   [email or web address]    Esteban Daly MD             Health Care Home 02/24/2015     Priority: Medium     Status:  Accepted  Care Coordinator:  Melissa Behl BSN, RN, PHN  [facility] Care Coordinator  [phone]     See Letters for [facility] Care Plan  Date:  April 4, 2016            Iron deficiency 05/23/2014     Priority: Medium     Vitamin D deficiency 05/22/2014     Priority: Medium     Former smoker 02/24/2014     Priority: Medium     Bile reflux esophagitis 10/16/2013     Priority: Medium     Constipation 10/01/2013     Priority: Medium     Chronic anxiety 09/25/2013     Priority: Medium     Dysphagia 09/17/2013      Priority: Medium     Weight loss, non-intentional 09/17/2013     Priority: Medium     Malnutrition (H) 09/17/2013     Priority: Medium     Dehydration 08/28/2013     Priority: Medium     Vitamin B12 deficiency without anemia 07/31/2013     Priority: Medium     Diagnosis updated by automated process. Provider to review and confirm.       Thiamine deficiency 07/31/2013     Priority: Medium     ADHD (attention deficit hyperactivity disorder), inattentive type 07/02/2013     Priority: Medium     Anemia 09/20/2012     Priority: Medium     Vomiting 06/28/2012     Priority: Medium     Chronic abdominal pain 06/01/2012     Priority: Medium     Patient is followed by ALEXANDRIA WHITLEY for ongoing prescription of narcotic pain medicine.  Med: liquid oxycodone up to 60 mg per day.   Maximum use per month:   Expected duration: ongoing, hope per surgery that will resolve with upcoming surgery  Narcotic agreement on file: YES  Clinic visit recommended: Q 3 months         CARDIOVASCULAR SCREENING; LDL GOAL LESS THAN 160 10/31/2010     Priority: Medium     Peptic ulcer disease 04/08/2010     Priority: Medium     Gastric bypass status for obesity 04/08/2010     Priority: Medium     Top weight of 492.        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)      Chronic abdominal pain      Complication of anesthesia      Difficulty swallowing      Gastric ulcer, unspecified as acute or chronic, without mention of hemorrhage, perforation, or obstruction      Gastro-oesophageal reflux disease      Generalized weakness 1/30/2018     Head injury      Hiatal hernia      Other bladder disorder      Other chronic pain      PONV (postoperative nausea and vomiting)      Severe malnutrition (H)     TPN     Short gut syndrome      Tobacco  abuse      Past Surgical History:   Procedure Laterality Date     AMPUTATION       APPENDECTOMY       BACK SURGERY  11/3/2014    curve in the spine     BIOPSY LYMPH NODE CERVICAL N/A 2/20/2015    Procedure: BIOPSY LYMPH NODE CERVICAL;  Surgeon: Baron Scanlon MD;  Location[facility]     C GASTRIC BYPASS,OBESE<100CM SHAYLEE-EN-Y  2002    lost 300 pounds     CHOLECYSTECTOMY       DISCECTOMY, FUSION CERVICAL ANTERIOR ONE LEVEL, COMBINED N/A 2/15/2017    Procedure: COMBINED DISCECTOMY, FUSION CERVICAL ANTERIOR ONE LEVEL;  Surgeon: Darren Campso MD;  [facility]     ENDOSCOPIC INSERTION TUBE GASTROSTOMY  9/9/2013    Procedure: ENDOSCOPIC INSERTION TUBE GASTROSTOMY;;  Surgeon: Francis Vyas MD;  [facility]     ENDOSCOPIC ULTRASOUND UPPER GASTROINTESTINAL TRACT (GI)  4/29/2011    Procedure:ENDOSCOPIC ULTRASOUND UPPER GASTROINTESTINAL TRACT (GI); Both Procedures done Conjointly; Surgeon:NEREIDA HOUSER; [facility]     ENDOSCOPIC ULTRASOUND UPPER GASTROINTESTINAL TRACT (GI)  9/9/2013    Procedure: ENDOSCOPIC ULTRASOUND UPPER GASTROINTESTINAL TRACT (GI);  Endoscopic Ultrasound Guide Gastrostomy Tube Placement  C-arm;  Surgeon: Noe Lizarraga MD;  [facility]     ENDOSCOPY  03/25/11    EGD, [place] [facility]     ENDOSCOPY  08/04/09    Upper Endoscopy, [place] [facility]     ENDOSCOPY  01/05/09    Upper Endoscopy, [place] [facility]     ESOPHAGOSCOPY, GASTROSCOPY, DUODENOSCOPY (EGD), COMBINED  4/20/2011    Procedure:COMBINED ESOPHAGOSCOPY, GASTROSCOPY, DUODENOSCOPY (EGD); Surgeon:FRANCIS VYAS; [facility]     ESOPHAGOSCOPY, GASTROSCOPY, DUODENOSCOPY (EGD), COMBINED  6/15/2011    Procedure:COMBINED ESOPHAGOSCOPY, GASTROSCOPY, DUODENOSCOPY (EGD); Surgeon:FRANCIS VYAS; [facility]     ESOPHAGOSCOPY, GASTROSCOPY, DUODENOSCOPY (EGD), COMBINED  6/12/2013    Procedure: COMBINED ESOPHAGOSCOPY, GASTROSCOPY, DUODENOSCOPY (EGD);;  Surgeon: Francis Vyas MD;  [facility]     ESOPHAGOSCOPY, GASTROSCOPY, DUODENOSCOPY (EGD), COMBINED  11/22/2013    Procedure: COMBINED ESOPHAGOSCOPY, GASTROSCOPY, DUODENOSCOPY (EGD);;  Surgeon: Francis Vyas MD;  Location:  OR     ESOPHAGOSCOPY, GASTROSCOPY, DUODENOSCOPY (EGD), COMBINED  4/30/2014    Procedure: COMBINED ESOPHAGOSCOPY, GASTROSCOPY, DUODENOSCOPY (EGD);  Surgeon: Francis Vyas MD;  Location:  GI     ESOPHAGOSCOPY, GASTROSCOPY, DUODENOSCOPY (EGD), COMBINED N/A 2/20/2015    Procedure: COMBINED ESOPHAGOSCOPY, GASTROSCOPY, DUODENOSCOPY (EGD), BIOPSY SINGLE OR MULTIPLE;  Surgeon: Baron Scanlon MD;  Location:  OR     ESOPHAGOSCOPY, GASTROSCOPY, DUODENOSCOPY (EGD), COMBINED N/A 9/30/2015    Procedure: COMBINED ESOPHAGOSCOPY, GASTROSCOPY, DUODENOSCOPY (EGD);  Surgeon: Francis Vyas MD;  Location:  GI     GASTRECTOMY  6/22/2012    Procedure: GASTRECTOMY;  Open Approach, Excise Ulcers,Partial Gastrectomy, Esophagojejunostomy, Hiatal Hernia Repair, Extensive Lysis of Adhesions and Esaphagogastrodudenoscopy.;  Surgeon: Francis Vyas MD;  Location:  OR     GASTROJEJUNOSTOMY  08/26/09    Extensice enterolysis, partial resect. jejunum, part. resect gastric pouch, gastrojejunostomy anastomosis     HC ESOPH/GAS REFLUX TEST W NASAL IMPED ELECTRODE  8/5/2013    Procedure: ESOPHAGEAL IMPEDENCE FUNCTION TEST 1 HOUR OR LESS;  Surgeon: Halie Lang MD;  Location:  GI     HEAD & NECK SURGERY  2/15/2017    C5-C6     HERNIA REPAIR  2006    Umbilical hernia     HERNIORRHAPHY HIATAL  6/22/2012    Procedure: HERNIORRHAPHY HIATAL;;  Surgeon: Francis Vyas MD;  Location:  OR     HERNIORRHAPHY INGUINAL  11/22/2013    Procedure: HERNIORRHAPHY INGUINAL;;  Surgeon: Francis Vyas MD;  Location:  OR     INSERT PORT VASCULAR ACCESS Right 12/19/2017    Procedure: INSERT PORT VASCULAR ACCESS;  Right Chest Port Placement ;  Surgeon: Lisandro Alejandro PA-C;  [facility]     LAPAROTOMY  "EXPLORATORY  11/22/2013    Procedure: LAPAROTOMY EXPLORATORY;  Exploratory Laparotomy, Upper Endoscopy, Left Inguinal Hernia Repair;  Surgeon: Francis Vyas MD;  [facility]     ORTHOPEDIC SURGERY       PICC INSERTION Right 03/16/2017    5fr DL BioFlo PICC, 42cm (3cm external) in the R medial brachial vein w/ tip in the SVC RA junction.     PICC INSERTION Left 09/23/2017    5fr DL BioFlo PICC, 45cm (1cm external) in the L basilic vein w/ tip in the SVC RA junction.     SHAYLEE EN Y BOWEL  2003     SOFT TISSUE SURGERY       SOFT TISSUE SURGERY       THORACIC SURGERY       TONSILLECTOMY       Current Outpatient Prescriptions   Medication Sig Dispense Refill     albuterol (PROAIR HFA/PROVENTIL HFA/VENTOLIN HFA) 108 (90 Base) MCG/ACT Inhaler Inhale 2 puffs into the lungs every 4 hours as needed for shortness of breath / dyspnea or wheezing 1 Inhaler 3     [START ON 8/8/2018] amphetamine-dextroamphetamine (ADDERALL) 20 MG per tablet Take 1 tablet (20 mg) by mouth daily 30 tablet 0     [START ON 9/7/2018] amphetamine-dextroamphetamine (ADDERALL) 20 MG per tablet Take 1 tablet (20 mg) by mouth daily 30 tablet 0     [START ON 10/7/2018] amphetamine-dextroamphetamine (ADDERALL) 20 MG per tablet Take 1 tablet (20 mg) by mouth daily 30 tablet 0     carvedilol (COREG) 12.5 MG tablet Take 12.5 mg by mouth 2 times daily (with meals)       cyanocobalamin (VITAMIN B12) 1000 MCG/ML injection Inject 1 mL (1,000 mcg) into the muscle every 30 days 1 mL 11     [place] [facility] INFUSION MANAGED PATIENT Contact [facility] for patient specific medication information at [phone] on admission and discharge from the hospital.  Phones are answered 24 hours a day 7 days a week 365 days a year.    Providers - Choose \"CONTINUE HOME MED (no script)\" at discharge if patient treatment with home infusion will continue.       fentaNYL (DURAGESIC) 12 mcg/hr 72 hr patch Place 1 patch onto the skin every 48 hours . Remove old " "patch.  Fill on/after 7/18/18 to start on/after 7/20/18. This is a 20 day supply to match up scripts.  Early release due to patches falling off. 10 patch 0     fentaNYL (DURAGESIC) 25 mcg/hr 72 hr patch Place 1 patch onto the skin every 48 hours remove old patch.  Release on/after 7/18/18 to start on/after 7/20/18. This is a 20 day supply to match up scripts. Early release due to patches falling off. 10 patch 0     lidocaine (LIDODERM) 5 % Patch Place 1-2 patches onto the skin every 24 hours Wear for 12 hours, remove for 12 hours.  OK to cut to better fit to size. 60 patch 6     lidocaine-prilocaine (EMLA) cream Apply topically as needed for moderate pain 30 g 3     naloxone (NARCAN) nasal spray Spray 1 spray (4 mg) into one nostril alternating nostrils as needed for opioid reversal (every 2-3 minutes until assistance arrives.) 0.2 mL 0     Needle, Disp, (BD DISP NEEDLES) 27G X 1/2\" MISC 1 Device every 30 days Use for cyanocobalamin injection once q 30 days. 3 each 4     ondansetron (ZOFRAN-ODT) 8 MG ODT tab Take 1 tablet (8 mg) by mouth every 8 hours as needed for nausea 90 tablet 3     order for DME Equipment being ordered: Bilateral knee high chronic venous insufficiency stockings--  mild-moderate pressures. 4 each 5     order for DME Injection Supplies for Vitamin B12: 3cc syringes w/ 27 gauge needles, 1/2 inch length 12 each 0     oxyCODONE (ROXICODONE) 5 MG/5ML solution Take 10-15 mLs (10-15 mg) by mouth every 4 hours as needed for moderate to severe pain Max of 55 mg/day this month. Wean as able Fill on/after 7/9/18 not to start untill 7/11/18. 1650 mL 0     sucralfate (CARAFATE) 1 GM/10ML suspension Take 10 mLs (1 g) by mouth 4 times daily (Patient taking differently: Take 1 g by mouth every 4 hours as needed (EPIGASTRIC PAIN, BILE BACKUP) ) 1200 mL 11     vitamin D (ERGOCALCIFEROL) 87034 UNIT capsule Take 1 capsule (50,000 Units) by mouth every 7 days (Patient taking differently: Take 50,000 Units by mouth " every 7 days On sundays) 12 capsule 3     acetaminophen (TYLENOL) 500 MG tablet Take 1,000 mg by mouth every 6 hours as needed for fever       parenteral nutrition - PTA/DISCHARGE ORDER [facility] to resume previous home TPN but run x 24 hours the first day until labs are ok. Then cycle x 14 hours per previous home regimen. He has been off TPN x 5 days here.       [DISCONTINUED] NO ACTIVE MEDICATIONS . 0 0     OTC products: None, except as noted above    Allergies   Allergen Reactions     Bactrim [Sulfamethoxazole W/Trimethoprim] Rash     Penicillins Anaphylaxis     Doxycycline Rash     Vancomycin Rash     Rash after receiving vancomycin 3/28/16 (red man's?). Tolerated with slower infusion and diphenhydramine premed.      Latex Allergy: NO    Social History   Substance Use Topics     Smoking status: Former Smoker     Packs/day: 0.10     Years: 3.00     Types: Cigarettes     Quit date: 7/28/2018     Smokeless tobacco: Never Used      Comment: I use an e cig every now and than     Alcohol use No      Comment: quit      History   Drug Use No       REVIEW OF SYSTEMS:   CONSTITUTIONAL: NEGATIVE for fever, chills, change in weight  CONSTITUTIONAL: Maintained on TPN.  INTEGUMENTARY/SKIN: NEGATIVE for worrisome rashes, moles or lesions  EYES: NEGATIVE for vision changes or irritation  ENT/MOUTH: NEGATIVE for ear, mouth and throat problems  RESP: NEGATIVE for significant cough or SOB  RESP: Has resumed smoking cigarettes.  BREAST: NEGATIVE for masses, tenderness or discharge  CV: NEGATIVE for chest pain, palpitations or peripheral edema  GI: Multiple previous abdominal surgeries, resulting in a situation of chronic pain for which she is utilizing Fentanyl and Oxycodone through chronic pain management.  In essence with multiple surgeries has had a total gastrectomy.  Uses a G-tube for ventilation of the upper abdominal tract.  Minimal stooling due to the TPN for nutritional maintenance.  : NEGATIVE for frequency, dysuria, or  hematuria  MUSCULOSKELETAL: NEGATIVE for significant arthralgias or myalgia  NEURO: NEGATIVE for weakness, dizziness or paresthesias  ENDOCRINE: NEGATIVE for temperature intolerance, skin/hair changes  HEME: NEGATIVE for bleeding problems  PSYCHIATRIC: NEGATIVE for changes in mood or affect  PSYCHIATRIC: Past history of anxiety related symptoms for which he was on Valium in the past.  He is totally off benzodiazepines.  He has reduced his ADHD medication to Adderall 20 mg daily instead of twice daily.    EXAM:   /60  Pulse 60  Temp 98.9  F (37.2  C) (Temporal)  Resp 16  Wt 193 lb (87.5 kg)  SpO2 99%  BMI 26.92 kg/m2    GENERAL APPEARANCE: alert, active, no distress and cooperative     EYES: EOMI,  PERRL     HENT: ear canals and TM's normal and nose and mouth without ulcers or lesions     NECK: no adenopathy, no asymmetry, masses, or scars and thyroid normal to palpation     RESP: lungs clear to auscultation - no rales, rhonchi or wheezes     BREAST: normal without masses, tenderness or nipple discharge and no palpable axillary masses or adenopathy     CV: regular rates and rhythm, normal S1 S2, no S3 or S4 and no murmur, click or rub     ABDOMEN:  soft, nontender, no HSM or masses and bowel sounds normal     ABDOMEN: Multiple abdominal incisions.  G-tube in place in the left upper abdomen.  No major tenderness and no distention at the present time.     Rectal exam: deferred     GU_male: testicles normal without atrophy or masses and no hernias     MS: extremities normal- no gross deformities noted, no evidence of inflammation in joints, FROM in all extremities.     MS: PICC line in place in the right upper extremity     SKIN: no suspicious lesions or rashes     NEURO: Normal strength and tone, sensory exam grossly normal, mentation intact and speech normal     PSYCH: mentation appears normal. and affect normal/bright     LYMPHATICS: No cervical adenopathy    DIAGNOSTICS:   EKG: Not indicated due to  non-vascular surgery and low risk of event (age <65 and without cardiac risk factors)    Recent Labs   Lab Test  07/19/18   1430  07/05/18   1330   06/04/18   1027   01/15/18   0801   07/23/13   1036   04/09/13   0921   HGB  12.2*  12.9*   < >   --    < >  12.2*   < >  10.5*   < >  10.6*   PLT  152  129*   < >   --    < >  109*   < >   --    < >   --    INR   --    --    --   1.1   --   1.08   < >   --    --    --    NA  146*  144   < >   --    < >  145*   < >  143   < >   --    POTASSIUM  3.7  3.6   < >   --    < >  3.4   < >  4.0   < >   --    CR  0.70  0.67   < >   --    < >  0.62*   < >  0.72   < >   --    A1C   --    --    --    --    --    --    --   4.9   --   5.3    < > = values in this interval not displayed.        IMPRESSION:   Reason for surgery/procedure:     Malnutrition due to inability to maintain adequate oral calories requiring chronic TPN  //  Placement of port vascular access    Diagnosis/reason for consult:     (Z01.818) Preop general physical exam  (primary encounter diagnosis)  Comment: Preoperative evaluation appears stable   Plan: Cleared for surgery.  No additional blood studies appear indicated.  No EKG appears indicated.    (E46) Malnutrition, unspecified type (H)  Comment: Maintained on TPN post gastrectomy and multiple abdominal surgeries and ability to maintain adequate oral calories.  Plan: Placement of port vascular access    (Z90.3) Status post gastrectomy  Comment: History as noted above  Plan:      (F90.0) ADHD (attention deficit hyperactivity disorder), inattentive type  Comment: Continue with Adderall 20 mg daily.  Prescriptions given for 3 months.  Plan: amphetamine-dextroamphetamine (ADDERALL) 20 MG         per tablet, amphetamine-dextroamphetamine         (ADDERALL) 20 MG per tablet,         amphetamine-dextroamphetamine (ADDERALL) 20 MG         per tablet            (Z11.4) Screening for HIV (human immunodeficiency virus)  Comment: To be drawn with next blood studies per CDC  guidelines  Plan: Expect low risk    (R10.9,  G89.29) Chronic abdominal pain  Comment: Chronic postoperative abdominal pain  Plan: Bands through [facility] with fentanyl patches and oral liquid oxycodone.    (E53.8) Vitamin B12 deficiency without anemia  Comment: Receives monthly injections  Plan:      (F41.9) Chronic anxiety  Comment: Stable without Valium.  Plan: Continue with no benzodiazepines    (D50.8) Other iron deficiency anemia  Comment: Continue to monitor.  Last hemoglobin adequate for surgery at 12.2 g percent  Plan:      (E78.5) Hyperlipidemia LDL goal <130  Comment: Monitoring periodically.  Plan: Lipid panel reflex to direct LDL Fasting            (R52) Pain  Comment: Refilling topical anesthetic cream for use of the port in the future.  Plan: lidocaine-prilocaine (EMLA) cream                The proposed surgical procedure is considered LOW risk.    REVISED CARDIAC RISK INDEX  The patient has the following serious cardiovascular risks for perioperative complications such as (MI, PE, VFib and 3  AV Block):  No serious cardiac risks  INTERPRETATION: 0 risks: Class I (very low risk - 0.4% complication rate)    The patient has the following additional risks for perioperative complications:  No identified additional risks        RECOMMENDATIONS:     --Consult hospital rounder / IM to assist post-op medical management      APPROVAL GIVEN to proceed with proposed procedure, without further diagnostic evaluation    Documentation of Face to Face and Certification for Home Health Services    I certify that patient: Parker Acevedo  is under my care and that I, or a nurse practitioner or physician's assistant working with me, had a face-to-face encounter that meets the physician face-to-face encounter requirements with this patient on: 8/9/2018.    This encounter with the patient was in whole, or in part, for the following medical condition, which is the primary reason for home  health care: Inability to maintain nutrition by oral means and requiring long-term TPN and fluid support through deep venous access and IV therapy.    I certify that, based on my findings, the following services are medically necessary home health services: Nursing.    My clinical findings support the need for the above services because: Nurse is needed: To assist in administration of TPN and IV fluids and instruct family..    Further, I certify that my clinical findings support that this patient is homebound (i.e. absences from home require considerable and taxing effort and are for medical reasons or [facility] services or infrequently or of short duration when for other reasons) because: Requires assistance of another person or specialized equipment to access medical services because patient:  Requires IV therapy, lifelong...    Based on the above findings. I certify that this patient is confined to the home and needs intermittent skilled nursing care, physical therapy and/or speech therapy.  The patient is under my care, and I have initiated the establishment of the plan of care.  This patient will be followed by a physician who will periodically review the plan of care.  Physician/Provider to provide follow up care: Esteban Daly    Attending hospital physician (the Medicare certified PEC[facility] provider): Esteban Daly MD  Physician Signature: See electronic signature associated with these discharge orders.  Date: 8/9/2018         Signed Electronically by: Esteban Daly MD    Copy of this evaluation report is provided to requesting physician.    [facility] Preop Guidelines    Revised Cardiac Risk Index

## 2018-07-31 ENCOUNTER — TELEPHONE (OUTPATIENT)
Dept: INTERVENTIONAL RADIOLOGY/VASCULAR | Facility: CLINIC | Age: 48
End: 2018-07-31

## 2018-07-31 RX ORDER — OXYCODONE HCL 5 MG/5 ML
10-15 SOLUTION, ORAL ORAL EVERY 4 HOURS PRN
Qty: 1500 ML | Refills: 0 | Status: SHIPPED | OUTPATIENT
Start: 2018-07-31 | End: 2018-08-27

## 2018-07-31 RX ORDER — FENTANYL 25 UG/1
1 PATCH TRANSDERMAL
Qty: 15 PATCH | Refills: 0 | Status: SHIPPED | OUTPATIENT
Start: 2018-07-31 | End: 2018-08-27

## 2018-07-31 ASSESSMENT — PATIENT HEALTH QUESTIONNAIRE - PHQ9: SUM OF ALL RESPONSES TO PHQ QUESTIONS 1-9: 2

## 2018-07-31 ASSESSMENT — ANXIETY QUESTIONNAIRES: GAD7 TOTAL SCORE: 2

## 2018-07-31 NOTE — TELEPHONE ENCOUNTER
I printed BOTH of the scripts here at [facility], so please destroy both at [facility].  That way we can get in the mail today.    Signed Prescriptions:                        Disp   Refills    fentaNYL (DURAGESIC) 25 mcg/hr 72 hr patch 15 pat*0        Sig: Place 1 patch onto the skin every 48 hours remove old           patch.  Release on/after 8/7/18 to start on/after           8/9/18.  Authorizing Provider: BRANDYN JENKINS    oxyCODONE (ROXICODONE) 5 MG/5ML solution   1500 mL0        Sig: Take 10-15 mLs (10-15 mg) by mouth every 4 hours as           needed for moderate to severe pain Max of 50           mg/day this month. Wean as able Fill on/after           8/7/18 not to start untill 8/9/18.  Authorizing Provider: BRANDYN JENKINS MD  [facility]

## 2018-07-31 NOTE — TELEPHONE ENCOUNTER
Scripts mailed to:    [facility] [place] - [address]  [address] [address]  Phone: [phone] Fax: [phone]    Will leave encounter open to discuss change in regimen. Waiting call back from patient.    YADIRA LazarN, RN  Care Coordinator  [facility]

## 2018-07-31 NOTE — TELEPHONE ENCOUNTER
Left a VM for patient to discuss regimen as outlined below.     Fentanyl would need to be 15 patches for 30 days. Script printed on 7/30/18 is for 10 patches. Will pend and route to Dr. Milligan. Current scripts are in [facility] and have not been mailed yet.    Would also like to clarify if they want these prescriptions mailed. Scripts were mailed last month.     YADIAR LazarN, RN  Care Coordinator  [facility] [place]

## 2018-07-31 NOTE — TELEPHONE ENCOUNTER
Spoke with Cammy wife and discussed change in dosing. She had no questions.     YADIRA LazarN, RN  Care Coordinator  [facility] [place]

## 2018-07-31 NOTE — TELEPHONE ENCOUNTER
[employer] message sent from pt .  Pt also attempted to call.  Writer closed MyChart encounter.    From: Parker Acevedo Sr        Created: 7/31/2018 2:10 PM         *-*-*This message has not been handled.*-*-*    You can just mail them over to [facility] any questions feel free to call us back at [phone]          Skinny Kim, RN  Care Coordinator   [facility] [place]

## 2018-07-31 NOTE — PROGRESS NOTES
This is a recent snapshot of the patient's [facility] medical record.  For current drug dose and complete information and questions, call [phone] or In Basket pool, [facility] home infusion ([id number])  CSN Number:  [id number]

## 2018-07-31 NOTE — TELEPHONE ENCOUNTER
Patient returning phone call. Please advise.    [phone] (home)       Amalia FONTANA    [facility] [place]

## 2018-07-31 NOTE — TELEPHONE ENCOUNTER
Rx's in [facility] Destroyed     Skinny Kim, RN  Care Coordinator   [facility] [place]

## 2018-08-01 ENCOUNTER — ANESTHESIA EVENT (OUTPATIENT)
Dept: SURGERY | Facility: AMBULATORY SURGERY CENTER | Age: 48
End: 2018-08-01

## 2018-08-02 ENCOUNTER — HOSPITAL ENCOUNTER (OUTPATIENT)
Facility: AMBULATORY SURGERY CENTER | Age: 48
End: 2018-08-02
Attending: PHYSICIAN ASSISTANT
Payer: COMMERCIAL

## 2018-08-02 ENCOUNTER — SURGERY (OUTPATIENT)
Age: 48
End: 2018-08-02

## 2018-08-02 ENCOUNTER — RADIANT APPOINTMENT (OUTPATIENT)
Dept: RADIOLOGY | Facility: AMBULATORY SURGERY CENTER | Age: 48
End: 2018-08-02
Attending: FAMILY MEDICINE
Payer: COMMERCIAL

## 2018-08-02 ENCOUNTER — ANESTHESIA (OUTPATIENT)
Dept: SURGERY | Facility: AMBULATORY SURGERY CENTER | Age: 48
End: 2018-08-02

## 2018-08-02 VITALS
RESPIRATION RATE: 16 BRPM | BODY MASS INDEX: 27.02 KG/M2 | DIASTOLIC BLOOD PRESSURE: 86 MMHG | TEMPERATURE: 97.8 F | SYSTOLIC BLOOD PRESSURE: 121 MMHG | OXYGEN SATURATION: 96 % | HEIGHT: 71 IN | WEIGHT: 193 LBS

## 2018-08-02 DIAGNOSIS — Z78.9 ON TOTAL PARENTERAL NUTRITION: ICD-10-CM

## 2018-08-02 DIAGNOSIS — Z98.84 S/P BARIATRIC SURGERY: ICD-10-CM

## 2018-08-02 DIAGNOSIS — Z45.2 ENCOUNTER FOR CARE RELATED TO VASCULAR ACCESS PORT: Primary | ICD-10-CM

## 2018-08-02 DIAGNOSIS — Z45.2 ENCOUNTER FOR CARE RELATED TO VASCULAR ACCESS PORT: ICD-10-CM

## 2018-08-02 LAB — INR PPP: 1.07 (ref 0.86–1.14)

## 2018-08-02 RX ORDER — LIDOCAINE HYDROCHLORIDE 20 MG/ML
INJECTION, SOLUTION INFILTRATION; PERINEURAL PRN
Status: DISCONTINUED | OUTPATIENT
Start: 2018-08-02 | End: 2018-08-02

## 2018-08-02 RX ORDER — LIDOCAINE 40 MG/G
CREAM TOPICAL
Status: DISCONTINUED | OUTPATIENT
Start: 2018-08-02 | End: 2018-08-03 | Stop reason: HOSPADM

## 2018-08-02 RX ORDER — SODIUM CHLORIDE, SODIUM LACTATE, POTASSIUM CHLORIDE, CALCIUM CHLORIDE 600; 310; 30; 20 MG/100ML; MG/100ML; MG/100ML; MG/100ML
INJECTION, SOLUTION INTRAVENOUS CONTINUOUS
Status: DISCONTINUED | OUTPATIENT
Start: 2018-08-02 | End: 2018-08-03 | Stop reason: HOSPADM

## 2018-08-02 RX ORDER — CITRIC ACID/SODIUM CITRATE 334-500MG
30 SOLUTION, ORAL ORAL ONCE
Status: COMPLETED | OUTPATIENT
Start: 2018-08-02 | End: 2018-08-02

## 2018-08-02 RX ORDER — FENTANYL CITRATE 50 UG/ML
25-50 INJECTION, SOLUTION INTRAMUSCULAR; INTRAVENOUS EVERY 5 MIN PRN
Status: DISCONTINUED | OUTPATIENT
Start: 2018-08-02 | End: 2018-08-03 | Stop reason: HOSPADM

## 2018-08-02 RX ORDER — NALOXONE HYDROCHLORIDE 0.4 MG/ML
.1-.4 INJECTION, SOLUTION INTRAMUSCULAR; INTRAVENOUS; SUBCUTANEOUS
Status: DISCONTINUED | OUTPATIENT
Start: 2018-08-02 | End: 2018-08-03 | Stop reason: HOSPADM

## 2018-08-02 RX ORDER — CLINDAMYCIN PHOSPHATE 900 MG/50ML
900 INJECTION, SOLUTION INTRAVENOUS
Status: COMPLETED | OUTPATIENT
Start: 2018-08-02 | End: 2018-08-02

## 2018-08-02 RX ORDER — ONDANSETRON 4 MG/1
4 TABLET, ORALLY DISINTEGRATING ORAL EVERY 30 MIN PRN
Status: DISCONTINUED | OUTPATIENT
Start: 2018-08-02 | End: 2018-08-03 | Stop reason: HOSPADM

## 2018-08-02 RX ORDER — SODIUM CHLORIDE, SODIUM LACTATE, POTASSIUM CHLORIDE, CALCIUM CHLORIDE 600; 310; 30; 20 MG/100ML; MG/100ML; MG/100ML; MG/100ML
INJECTION, SOLUTION INTRAVENOUS CONTINUOUS PRN
Status: DISCONTINUED | OUTPATIENT
Start: 2018-08-02 | End: 2018-08-02

## 2018-08-02 RX ORDER — ONDANSETRON 2 MG/ML
4 INJECTION INTRAMUSCULAR; INTRAVENOUS EVERY 30 MIN PRN
Status: DISCONTINUED | OUTPATIENT
Start: 2018-08-02 | End: 2018-08-03 | Stop reason: HOSPADM

## 2018-08-02 RX ORDER — PROPOFOL 10 MG/ML
INJECTION, EMULSION INTRAVENOUS CONTINUOUS PRN
Status: DISCONTINUED | OUTPATIENT
Start: 2018-08-02 | End: 2018-08-02

## 2018-08-02 RX ORDER — MEPERIDINE HYDROCHLORIDE 25 MG/ML
12.5 INJECTION INTRAMUSCULAR; INTRAVENOUS; SUBCUTANEOUS
Status: DISCONTINUED | OUTPATIENT
Start: 2018-08-02 | End: 2018-08-03 | Stop reason: HOSPADM

## 2018-08-02 RX ORDER — HEPARIN SODIUM,PORCINE 10 UNIT/ML
5 VIAL (ML) INTRAVENOUS EVERY 24 HOURS
Status: DISCONTINUED | OUTPATIENT
Start: 2018-08-02 | End: 2018-08-03 | Stop reason: HOSPADM

## 2018-08-02 RX ORDER — HEPARIN SODIUM,PORCINE 10 UNIT/ML
5-10 VIAL (ML) INTRAVENOUS
Status: DISCONTINUED | OUTPATIENT
Start: 2018-08-02 | End: 2018-08-03 | Stop reason: HOSPADM

## 2018-08-02 RX ORDER — ACETAMINOPHEN 325 MG/1
975 TABLET ORAL ONCE
Status: COMPLETED | OUTPATIENT
Start: 2018-08-02 | End: 2018-08-02

## 2018-08-02 RX ORDER — GABAPENTIN 300 MG/1
300 CAPSULE ORAL ONCE
Status: COMPLETED | OUTPATIENT
Start: 2018-08-02 | End: 2018-08-02

## 2018-08-02 RX ORDER — SODIUM CHLORIDE 9 MG/ML
INJECTION, SOLUTION INTRAVENOUS CONTINUOUS
Status: DISCONTINUED | OUTPATIENT
Start: 2018-08-02 | End: 2018-08-03 | Stop reason: HOSPADM

## 2018-08-02 RX ORDER — PROPOFOL 10 MG/ML
INJECTION, EMULSION INTRAVENOUS PRN
Status: DISCONTINUED | OUTPATIENT
Start: 2018-08-02 | End: 2018-08-02

## 2018-08-02 RX ADMIN — PROPOFOL 60 MG: 10 INJECTION, EMULSION INTRAVENOUS at 07:49

## 2018-08-02 RX ADMIN — PROPOFOL 40 MG: 10 INJECTION, EMULSION INTRAVENOUS at 07:57

## 2018-08-02 RX ADMIN — ACETAMINOPHEN 975 MG: 325 TABLET ORAL at 07:14

## 2018-08-02 RX ADMIN — GABAPENTIN 300 MG: 300 CAPSULE ORAL at 07:14

## 2018-08-02 RX ADMIN — PROPOFOL 100 MG: 10 INJECTION, EMULSION INTRAVENOUS at 07:42

## 2018-08-02 RX ADMIN — Medication 30 ML: at 07:14

## 2018-08-02 RX ADMIN — Medication 8 ML: at 07:59

## 2018-08-02 RX ADMIN — PROPOFOL 150 MCG/KG/MIN: 10 INJECTION, EMULSION INTRAVENOUS at 07:42

## 2018-08-02 RX ADMIN — CLINDAMYCIN PHOSPHATE 900 MG: 900 INJECTION, SOLUTION INTRAVENOUS at 07:41

## 2018-08-02 RX ADMIN — LIDOCAINE HYDROCHLORIDE 60 MG: 20 INJECTION, SOLUTION INFILTRATION; PERINEURAL at 07:42

## 2018-08-02 RX ADMIN — SODIUM CHLORIDE, SODIUM LACTATE, POTASSIUM CHLORIDE, CALCIUM CHLORIDE: 600; 310; 30; 20 INJECTION, SOLUTION INTRAVENOUS at 07:37

## 2018-08-02 ASSESSMENT — LIFESTYLE VARIABLES: TOBACCO_USE: 1

## 2018-08-02 NOTE — PROCEDURES
Interventional Radiology Brief Post Procedure Note    Procedure:  IR CVC TUNNEL PLACEMENT > 5 YRS OF AGE [[id number]]    Proceduralist: MOE August PA-C    Assistant: None    Time Out: Prior to the start of the procedure and with procedural staff participation, I verbally confirmed the patient s identity using two indicators, relevant allergies, that the procedure was appropriate and matched the consent or emergent situation, and that the correct equipment/implants were available. Immediately prior to starting the procedure I conducted the Time Out with the procedural staff and re-confirmed the patient s name, procedure, and site/side. (The Joint Commission universal protocol was followed.)  Yes        Sedation: Monitored Anesthesia Care (MAC) administered and documented by Anesthesia Care Provider    Findings: Completed placement of 5 Algerian 24 cm single lumen, Power Cm brand, tunneled central venous access catheter via RIJV. Tip lying in the right atrium. Okay to use immediately. Dx: On total parenteral nutrition; S/P bariatric surgery. [place].  MAC  <1    Estimated Blood Loss: Minimal    Fluoroscopy Time:  minute(s)    SPECIMENS: None    Complications: 1. None     Condition: Stable    Plan:     Comments: See dictated procedure note for full details.    Guy Jamil PA-C

## 2018-08-02 NOTE — ANESTHESIA PREPROCEDURE EVALUATION
Anesthesia Evaluation     . Pt has had prior anesthetic. Type: General    History of anesthetic complications   - PONV        ROS/MED HX    ENT/Pulmonary:  - neg pulmonary ROS   (+)tobacco use, Current use , . .    Neurologic:  - neg neurologic ROS     Cardiovascular:  - neg cardiovascular ROS   (+) ----. : . CHF . . :. .       METS/Exercise Tolerance:  4 - Raking leaves, gardening   Hematologic:  - neg hematologic  ROS       Musculoskeletal:  - neg musculoskeletal ROS       GI/Hepatic: Comment: Short gut    (+) GERD       Renal/Genitourinary:  - ROS Renal section negative       Endo:  - neg endo ROS       Psychiatric:     (+) psychiatric history anxiety      Infectious Disease:  - neg infectious disease ROS       Malignancy:      - no malignancy   Other:    (+) H/O Chronic Pain,H/O chronic opiod use ,                    Physical Exam  Normal systems: dental    Airway   Mallampati: II  TM distance: >3 FB  Neck ROM: full    Dental     Cardiovascular   Rhythm and rate: regular and normal      Pulmonary    breath sounds clear to auscultation                    Anesthesia Plan      History & Physical Review  History and physical reviewed and following examination; no interval change.    ASA Status:  3 .    NPO Status:  > 6 hours    Plan for MAC with Intravenous induction. Maintenance will be TIVA.  Reason for MAC:  Deep or markedly invasive procedure (G8)  PONV prophylaxis:  Ondansetron (or other 5HT-3) and Dexamethasone or Solumedrol  bicitra      Postoperative Care  Postoperative pain management:  Oral pain medications and Multi-modal analgesia.      Consents  Anesthetic plan, risks, benefits and alternatives discussed with:  Patient..                          .

## 2018-08-02 NOTE — DISCHARGE INSTRUCTIONS
A collaboration between [facility] and [facility]  Experts in minimally invasive, targeted treatments performed using imaging guidance    Venous Access Device,  Port Catheter or Tunneled or Non-Tunneled Central Line Placement    Today you had a procedure today to install a venous access device; either a tunneled central vein catheter or a subcutaneous port catheter.    One of our Radiology PAs performed this procedure for you today:  ? Koko Enriquez PA-C  ? MOE August PA-C  ? Jhonatan Alejandro PA-C  ? Linda Schaffer PA-C   ? Per Dumont PA-C    After you go home:  - Drink plenty of fluids.  Generally 6-8 (8 ounce) glasses a day is recommended.  - Resume your regular diet unless otherwise ordered by a medical provider.  - Keep any applied tape/gauze dressings clean and dry.  Change tape/gauze dressings if they get wet or soiled.  - You may shower the following day after procedure, however cover and protect from moisture any tape/gauze dressings.  You may let water hit and run over dried skin glue, but do not scrub.  Pat the area dry after showering.  - Port placement incisions are closed with absorbable suture, meaning they do not need to be removed at a later date, and a topical skin adhesive (skin glue).  This glue will wear off in 7-14 days.  Do not remove before this time.  If 14 days have passed and residual glue is present, you may gently remove it.  - Do not apply gels, lotions, or ointments to the glue site for the first 10 days as this may cause the glue to prematurely soften and fail.  - Do not perform strenuous activities or lift greater than 10 pounds for the next three days.  - If there is bleeding or oozing from the procedure site, apply firm pressure to the area for 5-10 minutes.  If the bleeding continues seek medical advice at the numbers below.  - Mild procedure site discomfort can be treated with an ice pack and over-the-counter pain  relievers.        For 24 hours after any sedation used:  - Relax and take it easy.  No strenuous activities.  - Do not drive or operate machines at home or at work.  - No alcohol consumption.  - Do not make any important or legal decisions.    Call our Interventional Radiology (IR) service if:  - If you start bleeding from the procedure site.  If you do start to bleed from the site, lie down and hold some pressure on the site.  Our radiology provider can help you decide if you need to return to the hospital.  - If you have new or worsening pain related to the procedure.  - If you have concerning swelling at the procedure site.  - If you develop persistent nausea or vomiting.  - If you develop hives or a rash or any unexplained itching.  - If you have a fever of greater than 100.5  F and chills in the first 5 days after procedure.  - Any other concerns related to your procedure.      [facility]  Interventional Radiology (IR)  [address] [place] [address]    Contact Number:  [phone]  (IR control desk)  - Monday - Friday 8:00 am - 4:30 pm    After hours for urgent concerns:  [phone]  - After 4:30 pm Monday - Friday, Weekends and Holidays.   - Ask for Interventional Radiology on-call.  Someone is available 24 hours a day.  - [facility] toll free number:  [phone]

## 2018-08-02 NOTE — ANESTHESIA CARE TRANSFER NOTE
Patient: Parker Acevedo Sr    Procedure(s):  Place single lumen tunneled central venous access catheter - Wound Class: I-Clean    Diagnosis: Encounter for Care Related to Vascular Access Port on Total Parenteral Nutrition Status Post Bariatric Surgery  Diagnosis Additional Information: No value filed.    Anesthesia Type:   MAC     Note:  Airway :Room Air  Patient transferred to:Phase II  Comments: VSS and WNL, comfortable, no PONV, report to Asha HARRISON. Pt impulsive and attempting to remove Cm dressing, pull, out PICC, get up off cart, etc. PACU RN at bedside with assistance for pt behavior management.Handoff Report: Identifed the Patient, Identified the Reponsible Provider, Reviewed the pertinent medical history, Discussed the surgical course, Reviewed Intra-OP anesthesia mangement and issues during anesthesia, Set expectations for post-procedure period and Allowed opportunity for questions and acknowledgement of understanding      Vitals: (Last set prior to Anesthesia Care Transfer)    CRNA VITALS  8/2/2018 0740 - 8/2/2018 0816      8/2/2018             Resp Rate (set): 10                Electronically Signed By: SAILAJA Levy CRNA  August 2, 2018  8:16 AM

## 2018-08-02 NOTE — IP AVS SNAPSHOT
[facility]    [address] [place] [id number]    Phone:  [phone]    Fax:  [phone]                                       After Visit Summary   8/2/2018    Parker Acevedo Sr    MRN: [id number]           After Visit Summary Signature Page     I have received my discharge instructions, and my questions have been answered. I have discussed any challenges I see with this plan with the nurse or doctor.    ..........................................................................................................................................  Patient/Patient Representative Signature      ..........................................................................................................................................  Patient Representative Print Name and Relationship to Patient    ..................................................               ................................................  Date                                            Time    ..........................................................................................................................................  Reviewed by Signature/Title    ...................................................              ..............................................  Date                                                            Time

## 2018-08-02 NOTE — IP AVS SNAPSHOT
MRN:[id number]                      After Visit Summary   8/2/2018    Parker Acevedo     MRN: [id number]           Thank you!     Thank you for choosing [facility] for your care. Our goal is always to provide you with excellent care. Hearing back from our patients is one way we can continue to improve our services. Please take a few minutes to complete the written survey that you may receive in the mail after you visit with us. Thank you!        Patient Information     Date Of Birth          1970        About your hospital stay     You were admitted on:  August 2, 2018 [facility]    You were discharged on:  August 2, 2018       Who to Call     For medical emergencies, please call 911.  For non-urgent questions about your medical care, please call your primary care provider or clinic, [phone]  For questions related to your surgery, please call your surgery clinic        Attending Provider     Provider Specialty    Guy Jamil PA-C Radiology       Primary Care Provider Office Phone # Fax #    Esteban Daly [phone]      Your next 10 appointments already scheduled     Sep 19, 2018  1:00 PM CDT   Return Visit with Patti Milligan MD   [facility] ([facility])    [address] [address] [phone]              Further instructions from your care team         A collaboration between [facility] and [facility]  Experts in minimally invasive, targeted treatments performed using imaging guidance    Venous Access Device,  Port Catheter or Tunneled or Non-Tunneled Central Line Placement    Today you had a procedure today to install a venous access device; either a tunneled central vein catheter or a subcutaneous port catheter.    One of our Radiology PAs performed this  procedure for you today:  ? Koko Enriquez PA-C  ? MOE August PA-C  ? Jhonatan Alejandro PA-C  ? Linda Schaffer PA-C   ? Per Dumont PA-C    After you go home:  - Drink plenty of fluids.  Generally 6-8 (8 ounce) glasses a day is recommended.  - Resume your regular diet unless otherwise ordered by a medical provider.  - Keep any applied tape/gauze dressings clean and dry.  Change tape/gauze dressings if they get wet or soiled.  - You may shower the following day after procedure, however cover and protect from moisture any tape/gauze dressings.  You may let water hit and run over dried skin glue, but do not scrub.  Pat the area dry after showering.  - Port placement incisions are closed with absorbable suture, meaning they do not need to be removed at a later date, and a topical skin adhesive (skin glue).  This glue will wear off in 7-14 days.  Do not remove before this time.  If 14 days have passed and residual glue is present, you may gently remove it.  - Do not apply gels, lotions, or ointments to the glue site for the first 10 days as this may cause the glue to prematurely soften and fail.  - Do not perform strenuous activities or lift greater than 10 pounds for the next three days.  - If there is bleeding or oozing from the procedure site, apply firm pressure to the area for 5-10 minutes.  If the bleeding continues seek medical advice at the numbers below.  - Mild procedure site discomfort can be treated with an ice pack and over-the-counter pain relievers.        For 24 hours after any sedation used:  - Relax and take it easy.  No strenuous activities.  - Do not drive or operate machines at home or at work.  - No alcohol consumption.  - Do not make any important or legal decisions.    Call our Interventional Radiology (IR) service if:  - If you start bleeding from the procedure site.  If you do start to bleed from the site, lie down and hold some pressure on the site.  Our radiology provider can help  "you decide if you need to return to the hospital.  - If you have new or worsening pain related to the procedure.  - If you have concerning swelling at the procedure site.  - If you develop persistent nausea or vomiting.  - If you develop hives or a rash or any unexplained itching.  - If you have a fever of greater than 100.5  F and chills in the first 5 days after procedure.  - Any other concerns related to your procedure.      [facility]  Interventional Radiology (IR)  [address]  [address]nd Floor, [facility] [place] [address]    Contact Number:  [phone]  (IR control desk)  - Monday - Friday 8:00 am - 4:30 pm    After hours for urgent concerns:  [phone]  - After 4:30 pm Monday - Friday, Weekends and Holidays.   - Ask for Interventional Radiology on-call.  Someone is available 24 hours a day.  - [facility] toll free number:  [phone]        Pending Results     Date and Time Order Name Status Description    8/2/2018 0758 IR CVC TUNNEL PLACEMENT > 5 YRS OF AGE In process             Admission Information     Date & Time Provider Department Dept. Phone    8/2/2018 Guy Jamil PA-C [facility] [phone]      Your Vitals Were     Blood Pressure Temperature Respirations Height Weight Pulse Oximetry    111/72 97.8  F (36.6  C) (Temporal) 16 1.803 m (5' 11\") 87.5 kg (193 lb) 96%    BMI (Body Mass Index)                   26.92 kg/m2           [employer] Information     [employer] gives you secure access to your electronic health record. If you see a primary care provider, you can also send messages to your care team and make appointments. If you have questions, please call your primary care clinic.  If you do not have a primary care provider, please call [phone] and they will assist you.      [employer] is an electronic gateway that provides easy, online access to your medical records. With [employer], you can request a clinic " appointment, read your test results, renew a prescription or communicate with your care team.     To access your existing account, please contact your [facility] or call [phone] for assistance.        Care EveryWhere ID     This is your Care EveryWhere ID. This could be used by other organizations to access your [facility] medical records  [id number]        Equal Access to Services     KATHRYN GUZMAN : Hadricki huff hadmeaghano Sooswaldali, waaxda luqadaha, qaybta kaalmada nimcoda, carmela woodдмитрийthalia dumont . So [facility] [phone].    ATENCIÓN: Si habla español, tiene a hicks disposición servicios gratuitos de asistencia lingüística. Llame al [phone].    We comply with applicable federal civil rights laws and [place] laws. We do not discriminate on the basis of race, color, national origin, age, disability, sex, sexual orientation, or gender identity.               Review of your medicines      UNREVIEWED medicines. Ask your doctor about these medicines        Dose / Directions    acetaminophen 500 MG tablet   Commonly known as:  TYLENOL        Dose:  1000 mg   Take 1,000 mg by mouth every 6 hours as needed for fever   Refills:  0       albuterol 108 (90 Base) MCG/ACT Inhaler   Commonly known as:  PROAIR HFA/PROVENTIL HFA/VENTOLIN HFA   Used for:  Productive cough        Dose:  2 puff   Inhale 2 puffs into the lungs every 4 hours as needed for shortness of breath / dyspnea or wheezing   Quantity:  1 Inhaler   Refills:  3       * amphetamine-dextroamphetamine 20 MG per tablet   Commonly known as:  ADDERALL   Used for:  ADHD (attention deficit hyperactivity disorder), inattentive type        Dose:  20 mg   Start taking on:  8/8/2018   Take 1 tablet (20 mg) by mouth daily   Quantity:  30 tablet   Refills:  0       * amphetamine-dextroamphetamine 20 MG per tablet   Commonly known as:  ADDERALL   Used for:  ADHD (attention deficit hyperactivity disorder), inattentive type      "   Dose:  20 mg   Start taking on:  9/7/2018   Take 1 tablet (20 mg) by mouth daily   Quantity:  30 tablet   Refills:  0       * amphetamine-dextroamphetamine 20 MG per tablet   Commonly known as:  ADDERALL   Used for:  ADHD (attention deficit hyperactivity disorder), inattentive type        Dose:  20 mg   Start taking on:  10/7/2018   Take 1 tablet (20 mg) by mouth daily   Quantity:  30 tablet   Refills:  0       COREG 12.5 MG tablet   Generic drug:  carvedilol        Dose:  12.5 mg   Take 12.5 mg by mouth 2 times daily (with meals)   Refills:  0       cyanocobalamin 1000 MCG/ML injection   Commonly known as:  VITAMIN B12   Used for:  Bariatric surgery status        Dose:  1 mL   Inject 1 mL (1,000 mcg) into the muscle every 30 days   Quantity:  1 mL   Refills:  11       [facility]NA[facility]ED PATIENT   Used for:  S/P bariatric surgery        Contact [facility] for patient specific medication information at [phone] on admission and discharge from the hospital.  Phones are answered 24 hours a day 7 days a week 365 days a year.  Providers - Choose \"CONTINUE HOME MED (no script)\" at discharge if patient treatment with home infusion will continue.   Refills:  0       fentaNYL 25 mcg/hr 72 hr patch   Commonly known as:  DURAGESIC   Used for:  Chronic abdominal pain, Encounter for long-term opiate analgesic use        Dose:  1 patch   Place 1 patch onto the skin every 48 hours remove old patch.  Release on/after 8/7/18 to start on/after 8/9/18.   Quantity:  15 patch   Refills:  0       lidocaine 5 % Patch   Commonly known as:  LIDODERM   Used for:  Chronic bilateral low back pain without sciatica, Chronic abdominal pain, Myofascial pain        Dose:  1-2 patch   Place 1-2 patches onto the skin every 24 hours Wear for 12 hours, remove for 12 hours.  OK to cut to better fit to size.   Quantity:  60 patch   Refills:  6       lidocaine-prilocaine cream   Commonly known as:  EMLA   Used for:  Pain "        Apply topically as needed for moderate pain   Quantity:  30 g   Refills:  3       naloxone nasal spray   Commonly known as:  NARCAN   Used for:  Encounter for long-term opiate analgesic use, Chronic abdominal pain        Dose:  4 mg   Spray 1 spray (4 mg) into one nostril alternating nostrils as needed for opioid reversal (every 2-3 minutes until assistance arrives.)   Quantity:  0.2 mL   Refills:  0       ondansetron 8 MG ODT tab   Commonly known as:  ZOFRAN-ODT   Used for:  Nausea        Dose:  8 mg   Take 1 tablet (8 mg) by mouth every 8 hours as needed for nausea   Quantity:  90 tablet   Refills:  3       oxyCODONE 5 MG/5ML solution   Commonly known as:  ROXICODONE   Used for:  Encounter for long-term opiate analgesic use, Chronic abdominal pain        Dose:  10-15 mg   Take 10-15 mLs (10-15 mg) by mouth every 4 hours as needed for moderate to severe pain Max of 50 mg/day this month. Wean as able Fill on/after 8/7/18 not to start untill 8/9/18.   Quantity:  1500 mL   Refills:  0       parenteral nutrition - PTA/DISCHARGE ORDER        HI to resume previous home TPN but run x 24 hours the first day until labs are ok. Then cycle x 14 hours per previous home regimen. He has been off TPN x 5 days here.   Refills:  0       sucralfate 1 GM/10ML suspension   Commonly known as:  CARAFATE   Used for:  Nausea        Dose:  1 g   Take 10 mLs (1 g) by mouth 4 times daily   Quantity:  1200 mL   Refills:  11       vitamin D 46582 UNIT capsule   Commonly known as:  ERGOCALCIFEROL   Used for:  Vitamin D deficiency        Dose:  15869 Units   Take 1 capsule (50,000 Units) by mouth every 7 days   Quantity:  12 capsule   Refills:  3       * Notice:  This list has 3 medication(s) that are the same as other medications prescribed for you. Read the directions carefully, and ask your doctor or other care provider to review them with you.      CONTINUE these medicines which have NOT CHANGED        Dose / Directions    Needle  "(Disp) 27G X 1/2\" Misc   Commonly known as:  BD DISP NEEDLES   Used for:  Bariatric surgery status        Dose:  1 Device   1 Device every 30 days Use for cyanocobalamin injection once q 30 days.   Quantity:  3 each   Refills:  4       * order for DME   Used for:  Bariatric surgery status        Injection Supplies for Vitamin B12: 3cc syringes w/ 27 gauge needles, 1/2 inch length   Quantity:  12 each   Refills:  0       * order for DME   Used for:  Bilateral edema of lower extremity        Equipment being ordered: Bilateral knee high chronic venous insufficiency stockings--  mild-moderate pressures.   Quantity:  4 each   Refills:  5       * Notice:  This list has 2 medication(s) that are the same as other medications prescribed for you. Read the directions carefully, and ask your doctor or other care provider to review them with you.             Protect others around you: Learn how to safely use, store and throw away your medicines at www.disposemymeds.org.             Medication List: This is a list of all your medications and when to take them. Check marks below indicate your daily home schedule. Keep this list as a reference.      Medications           Morning Afternoon Evening Bedtime As Needed    acetaminophen 500 MG tablet   Commonly known as:  TYLENOL   Take 1,000 mg by mouth every 6 hours as needed for fever   Last time this was given:  975 mg on 8/2/2018  7:14 AM                                albuterol 108 (90 Base) MCG/ACT Inhaler   Commonly known as:  PROAIR HFA/PROVENTIL HFA/VENTOLIN HFA   Inhale 2 puffs into the lungs every 4 hours as needed for shortness of breath / dyspnea or wheezing                                * amphetamine-dextroamphetamine 20 MG per tablet   Commonly known as:  ADDERALL   Take 1 tablet (20 mg) by mouth daily   Start taking on:  8/8/2018                                * amphetamine-dextroamphetamine 20 MG per tablet   Commonly known as:  ADDERALL   Take 1 tablet (20 mg) by " "mouth daily   Start taking on:  9/7/2018                                * amphetamine-dextroamphetamine 20 MG per tablet   Commonly known as:  ADDERALL   Take 1 tablet (20 mg) by mouth daily   Start taking on:  10/7/2018                                COREG 12.5 MG tablet   Take 12.5 mg by mouth 2 times daily (with meals)   Generic drug:  carvedilol                                cyanocobalamin 1000 MCG/ML injection   Commonly known as:  VITAMIN B12   Inject 1 mL (1,000 mcg) into the muscle every 30 days                                [facility] INFUSION MANAGED PATIENT   Contact [facility] for patient specific medication information at [phone] on admission and discharge from the hospital.  Phones are answered 24 hours a day 7 days a week 365 days a year.  Providers - Choose \"CONTINUE HOME MED (no script)\" at discharge if patient treatment with home infusion will continue.                                fentaNYL 25 mcg/hr 72 hr patch   Commonly known as:  DURAGESIC   Place 1 patch onto the skin every 48 hours remove old patch.  Release on/after 8/7/18 to start on/after 8/9/18.                                lidocaine 5 % Patch   Commonly known as:  LIDODERM   Place 1-2 patches onto the skin every 24 hours Wear for 12 hours, remove for 12 hours.  OK to cut to better fit to size.                                lidocaine-prilocaine cream   Commonly known as:  EMLA   Apply topically as needed for moderate pain                                naloxone nasal spray   Commonly known as:  NARCAN   Spray 1 spray (4 mg) into one nostril alternating nostrils as needed for opioid reversal (every 2-3 minutes until assistance arrives.)                                Needle (Disp) 27G X 1/2\" Misc   Commonly known as:  BD DISP NEEDLES   1 Device every 30 days Use for cyanocobalamin injection once q 30 days.                                ondansetron 8 MG ODT tab   Commonly known as:  ZOFRAN-ODT   Take 1 tablet " (8 mg) by mouth every 8 hours as needed for nausea                                * order for DME   Injection Supplies for Vitamin B12: 3cc syringes w/ 27 gauge needles, 1/2 inch length                                * order for DME   Equipment being ordered: Bilateral knee high chronic venous insufficiency stockings--  mild-moderate pressures.                                oxyCODONE 5 MG/5ML solution   Commonly known as:  ROXICODONE   Take 10-15 mLs (10-15 mg) by mouth every 4 hours as needed for moderate to severe pain Max of 50 mg/day this month. Wean as able Fill on/after 8/7/18 not to start untill 8/9/18.                                parenteral nutrition - PTA/DISCHARGE ORDER   [facility] to resume previous home TPN but run x 24 hours the first day until labs are ok. Then cycle x 14 hours per previous home regimen. He has been off TPN x 5 days here.                                sucralfate 1 GM/10ML suspension   Commonly known as:  CARAFATE   Take 10 mLs (1 g) by mouth 4 times daily                                vitamin D 99416 UNIT capsule   Commonly known as:  ERGOCALCIFEROL   Take 1 capsule (50,000 Units) by mouth every 7 days                                * Notice:  This list has 5 medication(s) that are the same as other medications prescribed for you. Read the directions carefully, and ask your doctor or other care provider to review them with you.

## 2018-08-02 NOTE — ANESTHESIA POSTPROCEDURE EVALUATION
Patient: Parker Acevedo Sr    Procedure(s):  Place single lumen tunneled central venous access catheter - Wound Class: I-Clean    Diagnosis:Encounter for Care Related to Vascular Access Port on Total Parenteral Nutrition Status Post Bariatric Surgery  Diagnosis Additional Information: No value filed.    Anesthesia Type:  MAC    Note:  Anesthesia Post Evaluation    Patient location during evaluation: bedside  Patient participation: Able to fully participate in evaluation  Level of consciousness: awake  Pain management: adequate  Airway patency: patent  Cardiovascular status: acceptable  Respiratory status: acceptable  Hydration status: acceptable  PONV: controlled     Anesthetic complications: None          Last vitals:  Vitals:    08/02/18 0816 08/02/18 0830 08/02/18 0845   BP: 111/72 123/86 121/86   Resp: 16 16 16   Temp: 36.6  C (97.8  F)  36.6  C (97.8  F)   SpO2: 96% 96% 96%         Electronically Signed By: Batrolo Kelly MD, MD  August 2, 2018  9:09 AM

## 2018-08-02 NOTE — OR NURSING
Patient arrived to Phase 2 awake and alert, impulsive and trying to remove PICC line in R arm.  Patient instructed to not touch lines and rest on cart.  RN at bedside and wife called to bedside.     Patient and wife educated on PICC lines and central lines in regard to dressings, securement and infection prevention.  Patient sent with Kathrine clamp for new central line.  PICC line removed from R arm per MD order, line was intact, no bleeding, pressure held and pressure dressing applied.

## 2018-08-06 ENCOUNTER — HOME INFUSION (PRE-WILLOW HOME INFUSION) (OUTPATIENT)
Dept: PHARMACY | Facility: CLINIC | Age: 48
End: 2018-08-06

## 2018-08-07 ENCOUNTER — HOME INFUSION (PRE-WILLOW HOME INFUSION) (OUTPATIENT)
Dept: PHARMACY | Facility: CLINIC | Age: 48
End: 2018-08-07

## 2018-08-07 PROBLEM — E78.5 HYPERLIPIDEMIA LDL GOAL <130: Status: ACTIVE | Noted: 2018-08-07

## 2018-08-08 ENCOUNTER — HOME INFUSION (PRE-WILLOW HOME INFUSION) (OUTPATIENT)
Dept: PHARMACY | Facility: CLINIC | Age: 48
End: 2018-08-08

## 2018-08-08 NOTE — PROGRESS NOTES
This is a recent snapshot of the patient's [facility] medical record.  For current drug dose and complete information and questions, call [phone] or In Basket pool, fv home infusion ([id number])  CSN Number:  [id number]

## 2018-08-08 NOTE — PROGRESS NOTES
This is a recent snapshot of the patient's [facility] medical record.  For current drug dose and complete information and questions, call [phone] or In Basket pool, fv home infusion ([id number])  CSN Number:  [id number]

## 2018-08-09 ENCOUNTER — TELEPHONE (OUTPATIENT)
Dept: FAMILY MEDICINE | Facility: CLINIC | Age: 48
End: 2018-08-09

## 2018-08-09 ENCOUNTER — HOME INFUSION (PRE-WILLOW HOME INFUSION) (OUTPATIENT)
Dept: PHARMACY | Facility: CLINIC | Age: 48
End: 2018-08-09

## 2018-08-09 DIAGNOSIS — E46 MALNUTRITION, UNSPECIFIED TYPE (H): Primary | ICD-10-CM

## 2018-08-09 DIAGNOSIS — Z78.9 ON TOTAL PARENTERAL NUTRITION: ICD-10-CM

## 2018-08-09 NOTE — TELEPHONE ENCOUNTER
Eladio stated that we should be able place a referral through home care nursing and use the face to face template.    Or    Eladio also said that the home care will fax a form to [facility] to complete in lieu of this F2F template.  I recommended that this occur instead of the F2F template option.

## 2018-08-09 NOTE — PROGRESS NOTES
This is a recent snapshot of the patient's [facility] medical record.  For current drug dose and complete information and questions, call [phone] or In Basket pool, fv home infusion ([id number])  CSN Number:  [id number]

## 2018-08-09 NOTE — TELEPHONE ENCOUNTER
Order generated in epic and sent.  Written forms can be destroyed as they were likely not needed.  Esteban Daly MD

## 2018-08-09 NOTE — TELEPHONE ENCOUNTER
Eladio informed.  He requests that Dr. Daly enter the Face 2 Face template into Epic so they have access to it in the format that is required.

## 2018-08-09 NOTE — TELEPHONE ENCOUNTER
Please inform that the.phrase was identified.    Face-to-face documentation was placed at the end of the preoperative evaluation on 7/30/18 and is part of his record.  Information was an addendum.    Esteban Daly MD  Please close encounter when call/work completed.

## 2018-08-09 NOTE — TELEPHONE ENCOUNTER
Visit on 7/30/18 was for preoperative evaluation and clearance for undergoing deep port access.  This can certainly be uses a face-to-face visit if the format is acceptable to home care.    Esteban Daly MD  Please call with any questions or concerns and thank you for your confidence.

## 2018-08-09 NOTE — TELEPHONE ENCOUNTER
Reason for Call:  Other [facility]    Detailed comments: Eladio from [facility] is calling, patient was seen on 07/30/2018 and [facility] is wanting to know if they can use that visit as a face to face visit? Please call Eladio at [phone].        Call taken on 8/9/2018 at 2:15 PM by Cecilia Duran

## 2018-08-10 ENCOUNTER — TELEPHONE (OUTPATIENT)
Dept: FAMILY MEDICINE | Facility: CLINIC | Age: 48
End: 2018-08-10

## 2018-08-10 ENCOUNTER — HOME INFUSION (PRE-WILLOW HOME INFUSION) (OUTPATIENT)
Dept: PHARMACY | Facility: CLINIC | Age: 48
End: 2018-08-10

## 2018-08-10 LAB
ALBUMIN SERPL-MCNC: 3.7 G/DL (ref 3.4–5)
ALP SERPL-CCNC: 87 U/L (ref 40–150)
ALT SERPL W P-5'-P-CCNC: 25 U/L (ref 0–70)
ANION GAP SERPL CALCULATED.3IONS-SCNC: 9 MMOL/L (ref 3–14)
AST SERPL W P-5'-P-CCNC: 11 U/L (ref 0–45)
BASOPHILS # BLD AUTO: 0 10E9/L (ref 0–0.2)
BASOPHILS NFR BLD AUTO: 0.2 %
BILIRUB DIRECT SERPL-MCNC: 0.2 MG/DL (ref 0–0.2)
BILIRUB SERPL-MCNC: 0.7 MG/DL (ref 0.2–1.3)
BUN SERPL-MCNC: 17 MG/DL (ref 7–30)
CALCIUM SERPL-MCNC: 8.4 MG/DL (ref 8.5–10.1)
CHLORIDE SERPL-SCNC: 108 MMOL/L (ref 94–109)
CO2 SERPL-SCNC: 28 MMOL/L (ref 20–32)
CREAT SERPL-MCNC: 0.76 MG/DL (ref 0.66–1.25)
DIFFERENTIAL METHOD BLD: ABNORMAL
EOSINOPHIL # BLD AUTO: 0.3 10E9/L (ref 0–0.7)
EOSINOPHIL NFR BLD AUTO: 3.8 %
ERYTHROCYTE [DISTWIDTH] IN BLOOD BY AUTOMATED COUNT: 13.9 % (ref 10–15)
GFR SERPL CREATININE-BSD FRML MDRD: >90 ML/MIN/1.7M2
GLUCOSE SERPL-MCNC: 54 MG/DL (ref 70–99)
HCT VFR BLD AUTO: 39.6 % (ref 40–53)
HGB BLD-MCNC: 12.8 G/DL (ref 13.3–17.7)
IMM GRANULOCYTES # BLD: 0 10E9/L (ref 0–0.4)
IMM GRANULOCYTES NFR BLD: 0.2 %
LYMPHOCYTES # BLD AUTO: 2.5 10E9/L (ref 0.8–5.3)
LYMPHOCYTES NFR BLD AUTO: 30.1 %
MAGNESIUM SERPL-MCNC: 2.1 MG/DL (ref 1.6–2.3)
MCH RBC QN AUTO: 31.1 PG (ref 26.5–33)
MCHC RBC AUTO-ENTMCNC: 32.3 G/DL (ref 31.5–36.5)
MCV RBC AUTO: 96 FL (ref 78–100)
MONOCYTES # BLD AUTO: 0.9 10E9/L (ref 0–1.3)
MONOCYTES NFR BLD AUTO: 11.4 %
NEUTROPHILS # BLD AUTO: 4.5 10E9/L (ref 1.6–8.3)
NEUTROPHILS NFR BLD AUTO: 54.3 %
NRBC # BLD AUTO: 0 10*3/UL
NRBC BLD AUTO-RTO: 0 /100
PHOSPHATE SERPL-MCNC: 3.1 MG/DL (ref 2.5–4.5)
PLATELET # BLD AUTO: 165 10E9/L (ref 150–450)
POTASSIUM SERPL-SCNC: 3.2 MMOL/L (ref 3.4–5.3)
PREALB SERPL IA-MCNC: 22 MG/DL (ref 15–45)
PROT SERPL-MCNC: 6.9 G/DL (ref 6.8–8.8)
RBC # BLD AUTO: 4.12 10E12/L (ref 4.4–5.9)
SODIUM SERPL-SCNC: 145 MMOL/L (ref 133–144)
TRIGL SERPL-MCNC: 76 MG/DL
WBC # BLD AUTO: 8.3 10E9/L (ref 4–11)

## 2018-08-10 PROCEDURE — 84100 ASSAY OF PHOSPHORUS: CPT | Performed by: SURGERY

## 2018-08-10 PROCEDURE — 84134 ASSAY OF PREALBUMIN: CPT | Performed by: SURGERY

## 2018-08-10 PROCEDURE — 83735 ASSAY OF MAGNESIUM: CPT | Performed by: SURGERY

## 2018-08-10 PROCEDURE — 85025 COMPLETE CBC W/AUTO DIFF WBC: CPT | Performed by: SURGERY

## 2018-08-10 PROCEDURE — 82248 BILIRUBIN DIRECT: CPT | Performed by: SURGERY

## 2018-08-10 PROCEDURE — 80053 COMPREHEN METABOLIC PANEL: CPT | Performed by: SURGERY

## 2018-08-10 PROCEDURE — 84478 ASSAY OF TRIGLYCERIDES: CPT | Performed by: SURGERY

## 2018-08-10 NOTE — TELEPHONE ENCOUNTER
Reason for call:  Form  Reason for Call:  Form, our goal is to have forms completed with 72 hours, however, some forms may require a visit or additional information.    Type of letter, form or note:  Medical    Who is the form from?:  [facility]     Where did the form come from: form was faxed in    What clinic location was the form placed at?: [facility] - [phone]    Where the form was placed: 's in box     What number is listed as a contact on the form?: [phone]       Additional comments: Fax back to [phone]    Call taken on 8/10/2018 at 7:12 AM by Sesar Willett

## 2018-08-10 NOTE — PROGRESS NOTES
This is a recent snapshot of the patient's [facility] medical record.  For current drug dose and complete information and questions, call [phone] or In Basket pool, fv home infusion ([id number])  CSN Number:  [id number]

## 2018-08-13 ENCOUNTER — DOCUMENTATION ONLY (OUTPATIENT)
Dept: CARE COORDINATION | Facility: CLINIC | Age: 48
End: 2018-08-13

## 2018-08-13 NOTE — PROGRESS NOTES
Requesting knowledged and should be fine to draw the requested blood studies.    Esteban Daly MD

## 2018-08-13 NOTE — PROGRESS NOTES
[facility] now requests orders and shares plan of care/discharge summaries for some patients through [employer].  Please REPLY TO THIS MESSAGE OR ROUTE BACK TO THE AUTHOR in order to give authorization for orders when needed.  This is considered a verbal order, you will still receive a faxed copy of orders for signature.  Thank you for your assistance in improving collaboration for our patients.    ORDER  Family is reporting that HIV and cholestrol is to be drawn with next TPN blood draw on 8/20, Is this ok for Home care to draw this?

## 2018-08-14 ENCOUNTER — HOME INFUSION (PRE-WILLOW HOME INFUSION) (OUTPATIENT)
Dept: PHARMACY | Facility: CLINIC | Age: 48
End: 2018-08-14

## 2018-08-14 ENCOUNTER — PATIENT OUTREACH (OUTPATIENT)
Dept: CARE COORDINATION | Facility: CLINIC | Age: 48
End: 2018-08-14

## 2018-08-14 ASSESSMENT — ACTIVITIES OF DAILY LIVING (ADL): DEPENDENT_IADLS:: MEDICATION MANAGEMENT;TRANSPORTATION;SHOPPING

## 2018-08-14 NOTE — PROGRESS NOTES
Clinic Care Coordination Contact    Clinic Care Coordination Contact  OUTREACH    Referral Information:  Referral Source: IP Report    Primary Diagnosis: SIRS/Sepsis    Chief Complaint   Patient presents with     Clinic Care Coordination - Follow-up     RN        [place] Utilization:   Clinic Utilization  Difficulty keeping appointments:: No  Utilization    Last refreshed: 8/14/2018 12:19 PM:  No Show Count (past year) 3       Last refreshed: 8/14/2018 12:19 PM:  ED visits 3       Last refreshed: 8/14/2018 12:19 PM:  Hospital admissions 3          Current as of: 8/14/2018 12:19 PM             Clinical Concerns:  Current Medical Concerns: RNCC spoke with spouse per patient request.  Patient had new Cm placed 8/2/18. Spouse Rose states it was due to patient having high rate of infections with her ports.  Patient is now receiving his infusion services through [facility] instead of [facility].  RN Case Manager is Jenni Barger, [phone].  Rose states it is going very well through their services.  Patient will need to establish with a new PCP prior Dr. Daly's snf.  Patient made a goal to schedule an appointment by November at either [place] or [facility].  Patient Active Problem List   Diagnosis     Peptic ulcer disease     Gastric bypass status for obesity     Chronic abdominal pain     Vomiting     Anemia     ADHD (attention deficit hyperactivity disorder), inattentive type     Vitamin B12 deficiency without anemia     Thiamine deficiency     Dehydration     Dysphagia     Weight loss, non-intentional     Malnutrition (H)     Chronic anxiety     Constipation     Bile reflux esophagitis     Former smoker     Vitamin D deficiency     Iron deficiency     Health Care Home     Chronic pain     Insomnia     Positive blood culture     Fungemia     Chronic nausea     Gastrostomy tube in place (H)     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)     Short gut syndrome      Anxiety     Status post cervical spinal arthrodesis     Port or reservoir infection, initial encounter     Abnormal echocardiogram     Low serum iron     Anemia, iron deficiency     Catheter-related bloodstream infection (CRBSI)     Generalized weakness     S/P bariatric surgery     Hyperlipidemia LDL goal <130        Current Behavioral Concerns: Patient has ADHD and anxiety managed through his PCP.    Education Provided to patient: RNCC reviewed [facility] locations to establish care with PCP.   Pain  Pain (GOAL):: Yes  Type: Chronic (>3mo)  Location of chronic pain:: chronic abdominal pain and lower back pain  Radiating: Yes  Progression: Worsening  Chronic pain severity:: 7  Limitation of routine activities due to chronic pain:: Yes  Description: Unable to perform most daily activities (chores, hobbies, social activities, driving)  Alleviating Factors: Pain Medication, Rest, Heat  Health Maintenance Reviewed: Due/Overdue   Health Maintenance Due   Topic Date Due     ADVANCE DIRECTIVE PLANNING Q5 YRS  11/06/1990     HIV SCREEN (SYSTEM ASSIGNED)  11/06/1990     LIPID MONITORING Q1 YEAR  05/04/2017      Clinical Pathway: None    Medication Management:  Spouse manages patient's medications for him and connects him to his TPN and hydration.     Functional Status:  Dependent ADLs:: Ambulation-cane, Bathing  Dependent IADLs:: Medication Management, Transportation, Shopping  Bed or wheelchair confined:: No  Mobility Status: Independent  Fallen 2 or more times in the past year?: Yes  Any fall with injury in the past year?: No    Living Situation:  Current living arrangement:: I live in a private home with spouse  Type of residence:: Private home - stairs    Diet/Exercise/Sleep:  Diet:: Regular (But also has TPN)  Inadequate nutrition (GOAL):: No  Food Insecurity: No  Tube Feeding: No  Exercise:: Yes  Days per week of moderate to strenuous exercise (like a brisk walk): 2  On average, minutes per day of exercise at  this level: 10  How intense was your typical exercise? : Light (like stretching or slow walking)  Exercise Minutes per Week: 20  Inadequate activity/exercise (GOAL):: No  Significant changes in sleep pattern (GOAL): No    Transportation:  Transportation concerns (GOAL):: No  Transportation means:: Regular car     Psychosocial:  Orthodoxy or spiritual beliefs that impact treatment:: No  Mental health DX:: Yes  Mental health DX how managed:: Medication  Mental health management concern (GOAL):: No  Informal Support system:: Family, Spouse     Financial/Insurance:   Financial/Insurance concerns (GOAL):: No       Resources and Interventions:  Current Resources:    ;   Community Resources: Home Infusion, Home Care  Supplies used at home:: None  Equipment Currently Used at Home: cane, straight, shower chair    Advance Care Plan/Directive  Advanced Care Plans/Directives on file:: Yes  Type Advanced Care Plans/Directives: Advanced Directive - On File  Advanced Care Plan/Directive Status: Not Applicable    Referrals Placed: None     Goals: Goal Statement: I will establish care with a new PCP prior to my current PCP's snf.  Measure of Success: Patient will schedule an attend an appointment with a new PCP.  Supportive Steps to Achieve: RNCC reviewed [facility] locations.  Barriers: unknown  Strengths: has support from spouse  Date to Achieve By: 11/9/18  Patient expressed understanding of goal: yes        Patient/Caregiver understanding: Patient and spouse have fair understanding of current plan of care.    Outreach Frequency: monthly  Future Appointments              In 1 month Patti Milligan MD [facility]          Plan:   1. Patient will establish care with a new PCP prior to current PCP's snf.  2. RNCC will follow up with patient in 1 month.  3. RNCC notified [facility] of RNCC's involvement and contact information.    Melissa Behl BSN, RN, PHN  [facility]  Care Coordinator  [phone]

## 2018-08-14 NOTE — LETTER
[facility]  Complex Care Plan  About Me  Patient Name:  Parker Acevedo Sr    YOB: 1970  Age:     47 year old   [place] MRN:   [id number] Telephone Information:    Home Phone [phone]   Mobile [phone]       Address:    [address] [place] [address] [address] [place] [address] Email address:  [email or web address]      Emergency Contact(s)  Name Relationship Lgl Grd Work Phone Home Phone Mobile Phone   1. NAHUM ACEVEDO* Spouse   [phone]   2. MARELY ACEVEDO* Mother   [phone]           Primary language:  English     needed? No   [place] [facility]:  [phone] op. 1  Other communication barriers: Glasses, Physical impairment  Preferred Method of Communication:  Chris  Current living arrangement: I live in a private home with spouse  Mobility Status/ Medical Equipment: Independent    Health Maintenance  Health Maintenance Reviewed: Due/Overdue   Health Maintenance Due   Topic Date Due     ADVANCE DIRECTIVE PLANNING Q5 YRS  11/06/1990     HIV SCREEN (SYSTEM ASSIGNED)  11/06/1990     LIPID MONITORING Q1 YEAR  05/04/2017        My Access Plan  Medical Emergency 911   Primary Clinic Line [facility] - [phone]   24 Hour Appointment Line [phone] or  [phone]) (toll-free)   24 Hour Nurse Line [phone] (toll-free)   Preferred Urgent Care Other   Preferred Hospital [facility]  [phone]   Preferred Pharmacy [facility] [place], [place] - [address] [place] [address]     Behavioral Health Crisis Line The National Suicide Prevention Lifeline at 1-293.871.6546 or 911     My Care Team Members    Patient Care Team       Relationship Specialty Notifications Start End    Esteban Daly MD PCP - General Family Practice  6/2/15     Comment:  Merged    Phone: [phone] Fax: [phone] [address] [place] [address]     Francis Vyas MD Referring Physician Surgery  4/9/15     Comment:  GI     Phone: [phone] Fax: [phone]         [address]  [facility] [address]    Esteban Daly MD Referring Physician [facility]  4/9/15     Phone: [phone] Fax: [phone]         [address] [facility] [address]    Esteban Daly MD   Family Practice  6/2/15     Comment:  Merged    Phone: [phone] Fax: [phone]         [address] [facility] [address]    Bill Brumfield MD MD Gastroenterology  6/2/15     Phone: [phone] Fax: [phone]         [address] [facility] [address]    Patti Milligan MD MD Pain Clinic  6/2/15     Phone: [phone] Fax: [phone]         [address] [facility] [address]    Behl, Melissa K, RN Lead Care Coordinator Primary Care - CC Admissions 3/28/18     Phone: [phone] Fax: [phone]        Care, [facility] [place] [facility] [place]  8/9/18     Phone: [phone]         [facility] [place]  8/10/18     Phone: [phone]                 My Care Plans  Self Management and Treatment Plan  Goals and (Comments)  Goals        General    Medical (pt-stated)     Notes - Note created  8/14/2018 12:26 PM by Behl, Melissa K, RN    Goal Statement: I will establish care with a new PCP prior to my current PCP's snf.  Measure of Success: Patient will schedule an attend an appointment with a new PCP.  Supportive Steps to Achieve: RNCC reviewed [place] clinic locations.  Barriers: unknown  Strengths: has support from spouse  Date to Achieve By: 11/9/18  Patient expressed understanding of goal: yes                Action Plans on File:                       Advance Care Plans/Directives Type:   Type Advanced Care Plans/Directives: Advanced Directive - On File    My Medical and Care Information  Problem List   Patient Active Problem List   Diagnosis      Peptic ulcer disease     Gastric bypass status for obesity     Chronic abdominal pain     Vomiting     Anemia     ADHD (attention deficit hyperactivity disorder), inattentive type     Vitamin B12 deficiency without anemia     Thiamine deficiency     Dehydration     Dysphagia     Weight loss, non-intentional     Malnutrition (H)     Chronic anxiety     Constipation     Bile reflux esophagitis     Former smoker     Vitamin D deficiency     Iron deficiency     Health Care Home     Chronic pain     Insomnia     Positive blood culture     Fungemia     Chronic nausea     Gastrostomy tube in place (H)     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)     Short gut syndrome     Anxiety     Status post cervical spinal arthrodesis     Port or reservoir infection, initial encounter     Abnormal echocardiogram     Low serum iron     Anemia, iron deficiency     Catheter-related bloodstream infection (CRBSI)     Generalized weakness     S/P bariatric surgery     Hyperlipidemia LDL goal <130      Current Medications and Allergies:  See printed Medication Report.    Care Coordination Start Date: 8/14/2018   Frequency of Care Coordination: monthly   Form Last Updated: 08/14/2018

## 2018-08-15 ENCOUNTER — TELEPHONE (OUTPATIENT)
Dept: FAMILY MEDICINE | Facility: CLINIC | Age: 48
End: 2018-08-15

## 2018-08-15 NOTE — TELEPHONE ENCOUNTER
Reason for call:  Form  Reason for Call:  Form, our goal is to have forms completed with 72 hours, however, some forms may require a visit or additional information.    Type of letter, form or note:  Medical    Who is the form from?: [facility]      Where did the form come from: form was faxed in    What clinic location was the form placed at?: [facility] - [phone]    Where the form was placed: 's in box     What number is listed as a contact on the form?: [phone]       Additional comments: Fax back to [phone]    Call taken on 8/15/2018 at 10:39 AM by Sesar Willett

## 2018-08-16 NOTE — PROGRESS NOTES
This is a recent snapshot of the patient's [facility] medical record.  For current drug dose and complete information and questions, call [phone] or In Basket pool, fv home infusion ([id number])  CSN Number:  [id number]

## 2018-08-20 ENCOUNTER — HOME INFUSION (PRE-WILLOW HOME INFUSION) (OUTPATIENT)
Dept: PHARMACY | Facility: CLINIC | Age: 48
End: 2018-08-20

## 2018-08-20 LAB
ALBUMIN SERPL-MCNC: 3.7 G/DL (ref 3.4–5)
ALP SERPL-CCNC: 78 U/L (ref 40–150)
ALT SERPL W P-5'-P-CCNC: 19 U/L (ref 0–70)
ANION GAP SERPL CALCULATED.3IONS-SCNC: 7 MMOL/L (ref 3–14)
AST SERPL W P-5'-P-CCNC: 10 U/L (ref 0–45)
BASOPHILS # BLD AUTO: 0 10E9/L (ref 0–0.2)
BASOPHILS NFR BLD AUTO: 0.5 %
BILIRUB DIRECT SERPL-MCNC: 0.1 MG/DL (ref 0–0.2)
BILIRUB SERPL-MCNC: 0.5 MG/DL (ref 0.2–1.3)
BUN SERPL-MCNC: 11 MG/DL (ref 7–30)
CALCIUM SERPL-MCNC: 8.6 MG/DL (ref 8.5–10.1)
CHLORIDE SERPL-SCNC: 108 MMOL/L (ref 94–109)
CHOLEST SERPL-MCNC: 109 MG/DL
CO2 SERPL-SCNC: 30 MMOL/L (ref 20–32)
CREAT SERPL-MCNC: 0.71 MG/DL (ref 0.66–1.25)
CRP SERPL-MCNC: <2.9 MG/L (ref 0–8)
DIFFERENTIAL METHOD BLD: ABNORMAL
EOSINOPHIL # BLD AUTO: 0.2 10E9/L (ref 0–0.7)
EOSINOPHIL NFR BLD AUTO: 3.4 %
ERYTHROCYTE [DISTWIDTH] IN BLOOD BY AUTOMATED COUNT: 13.3 % (ref 10–15)
FERRITIN SERPL-MCNC: 66 NG/ML (ref 26–388)
GFR SERPL CREATININE-BSD FRML MDRD: >90 ML/MIN/1.7M2
GLUCOSE SERPL-MCNC: 62 MG/DL (ref 70–99)
HCT VFR BLD AUTO: 39.9 % (ref 40–53)
HGB BLD-MCNC: 13 G/DL (ref 13.3–17.7)
IMM GRANULOCYTES # BLD: 0 10E9/L (ref 0–0.4)
IMM GRANULOCYTES NFR BLD: 0.2 %
IRON SERPL-MCNC: 72 UG/DL (ref 35–180)
LYMPHOCYTES # BLD AUTO: 1.8 10E9/L (ref 0.8–5.3)
LYMPHOCYTES NFR BLD AUTO: 31.1 %
MAGNESIUM SERPL-MCNC: 2.1 MG/DL (ref 1.6–2.3)
MANGANESE BLD-MCNC: NORMAL NG/ML
MCH RBC QN AUTO: 31.2 PG (ref 26.5–33)
MCHC RBC AUTO-ENTMCNC: 32.6 G/DL (ref 31.5–36.5)
MCV RBC AUTO: 96 FL (ref 78–100)
MONOCYTES # BLD AUTO: 0.6 10E9/L (ref 0–1.3)
MONOCYTES NFR BLD AUTO: 10.5 %
NEUTROPHILS # BLD AUTO: 3.1 10E9/L (ref 1.6–8.3)
NEUTROPHILS NFR BLD AUTO: 54.3 %
NRBC # BLD AUTO: 0 10*3/UL
NRBC BLD AUTO-RTO: 0 /100
PHOSPHATE SERPL-MCNC: 3 MG/DL (ref 2.5–4.5)
PLATELET # BLD AUTO: 156 10E9/L (ref 150–450)
POTASSIUM SERPL-SCNC: 3.3 MMOL/L (ref 3.4–5.3)
PREALB SERPL IA-MCNC: 23 MG/DL (ref 15–45)
PROT SERPL-MCNC: 7.2 G/DL (ref 6.8–8.8)
RBC # BLD AUTO: 4.17 10E12/L (ref 4.4–5.9)
SODIUM SERPL-SCNC: 145 MMOL/L (ref 133–144)
TRIGL SERPL-MCNC: 71 MG/DL
WBC # BLD AUTO: 5.6 10E9/L (ref 4–11)

## 2018-08-20 PROCEDURE — 85025 COMPLETE CBC W/AUTO DIFF WBC: CPT | Performed by: SURGERY

## 2018-08-20 PROCEDURE — 84100 ASSAY OF PHOSPHORUS: CPT | Performed by: SURGERY

## 2018-08-20 PROCEDURE — 84478 ASSAY OF TRIGLYCERIDES: CPT | Performed by: SURGERY

## 2018-08-20 PROCEDURE — 82728 ASSAY OF FERRITIN: CPT | Performed by: SURGERY

## 2018-08-20 PROCEDURE — 82465 ASSAY BLD/SERUM CHOLESTEROL: CPT | Performed by: SURGERY

## 2018-08-20 PROCEDURE — 82248 BILIRUBIN DIRECT: CPT | Performed by: SURGERY

## 2018-08-20 PROCEDURE — 84630 ASSAY OF ZINC: CPT | Performed by: SURGERY

## 2018-08-20 PROCEDURE — 83735 ASSAY OF MAGNESIUM: CPT | Performed by: SURGERY

## 2018-08-20 PROCEDURE — 87389 HIV-1 AG W/HIV-1&-2 AB AG IA: CPT | Performed by: SURGERY

## 2018-08-20 PROCEDURE — 86140 C-REACTIVE PROTEIN: CPT | Performed by: SURGERY

## 2018-08-20 PROCEDURE — 84134 ASSAY OF PREALBUMIN: CPT | Performed by: SURGERY

## 2018-08-20 PROCEDURE — 84255 ASSAY OF SELENIUM: CPT | Performed by: SURGERY

## 2018-08-20 PROCEDURE — 83540 ASSAY OF IRON: CPT | Performed by: SURGERY

## 2018-08-20 PROCEDURE — 83785 ASSAY OF MANGANESE: CPT | Performed by: SURGERY

## 2018-08-20 PROCEDURE — 80053 COMPREHEN METABOLIC PANEL: CPT | Performed by: SURGERY

## 2018-08-20 PROCEDURE — 82525 ASSAY OF COPPER: CPT | Performed by: SURGERY

## 2018-08-21 LAB — HIV 1+2 AB+HIV1 P24 AG SERPL QL IA: NONREACTIVE

## 2018-08-21 NOTE — PROGRESS NOTES
This is a recent snapshot of the patient's [facility] medical record.  For current drug dose and complete information and questions, call [phone] or In Basket pool, fv home infusion ([id number])  CSN Number:  [id number]

## 2018-08-22 ENCOUNTER — HOME INFUSION (PRE-WILLOW HOME INFUSION) (OUTPATIENT)
Dept: PHARMACY | Facility: CLINIC | Age: 48
End: 2018-08-22

## 2018-08-23 LAB
COPPER SERPL-MCNC: 112 UG/DL (ref 70–140)
ZINC SERPL-MCNC: 76 UG/DL (ref 60–120)

## 2018-08-23 NOTE — PROGRESS NOTES
This is a recent snapshot of the patient's [facility] medical record.  For current drug dose and complete information and questions, call [phone] or In Basket pool, fv home infusion ([id number])  CSN Number:  [id number]

## 2018-08-23 NOTE — PROGRESS NOTES
This is a recent snapshot of the patient's [facility] medical record.  For current drug dose and complete information and questions, call [phone] or In Basket pool, fv home infusion ([id number])  CSN Number:  [id number]

## 2018-08-26 ENCOUNTER — MYC MEDICAL ADVICE (OUTPATIENT)
Dept: PALLIATIVE MEDICINE | Facility: CLINIC | Age: 48
End: 2018-08-26

## 2018-08-26 DIAGNOSIS — G89.29 CHRONIC ABDOMINAL PAIN: ICD-10-CM

## 2018-08-26 DIAGNOSIS — R10.9 CHRONIC ABDOMINAL PAIN: ICD-10-CM

## 2018-08-26 DIAGNOSIS — Z79.891 ENCOUNTER FOR LONG-TERM OPIATE ANALGESIC USE: ICD-10-CM

## 2018-08-27 ENCOUNTER — TELEPHONE (OUTPATIENT)
Dept: FAMILY MEDICINE | Facility: CLINIC | Age: 48
End: 2018-08-27

## 2018-08-27 ENCOUNTER — MYC MEDICAL ADVICE (OUTPATIENT)
Dept: PALLIATIVE MEDICINE | Facility: CLINIC | Age: 48
End: 2018-08-27

## 2018-08-27 RX ORDER — OXYCODONE HCL 5 MG/5 ML
10-15 SOLUTION, ORAL ORAL EVERY 4 HOURS PRN
Qty: 1350 ML | Refills: 0 | Status: SHIPPED | OUTPATIENT
Start: 2018-08-27 | End: 2018-09-24

## 2018-08-27 RX ORDER — FENTANYL 25 UG/1
1 PATCH TRANSDERMAL
Qty: 15 PATCH | Refills: 0 | Status: SHIPPED | OUTPATIENT
Start: 2018-08-27 | End: 2018-09-24

## 2018-08-27 NOTE — TELEPHONE ENCOUNTER
Signed Rx's mailing from [facility] on 08/28/18. Mailing to [facility] [place]. Patient was informed via [employer].

## 2018-08-27 NOTE — TELEPHONE ENCOUNTER
Opioid refills to be mailed to [facility].    YADIRA LazarN, RN  Care Coordinator  [facility] [place]

## 2018-08-27 NOTE — TELEPHONE ENCOUNTER
We are slowly weaning, per last note would go to 45 mg/day max of the oxycodone.    Signed Prescriptions:                        Disp   Refills    fentaNYL (DURAGESIC) 25 mcg/hr 72 hr patch 15 pat*0        Sig: Place 1 patch onto the skin every 48 hours remove old           patch.  Release on/after 9/6/18 to start on/after           9/8/18.  Authorizing Provider: BRANDYN JENKINS    oxyCODONE (ROXICODONE) 5 MG/5ML solution   1350 mL0        Sig: Take 10-15 mLs (10-15 mg) by mouth every 4 hours as           needed for moderate to severe pain Max of 45           mg/day this month. Weaning dose as discussed.           Fill on/after 9/6/18 not to start untill 9/8/18.  Authorizing Provider: BRANDYN JENKINS MD [facility]     Parker,  Just a reminder that this month the oxycodone is max of 45mg per day.    This is down 5mg from last month.    MD [facility]

## 2018-08-27 NOTE — TELEPHONE ENCOUNTER
Reason for Call:  Form, our goal is to have forms completed with 72 hours, however, some forms may require a visit or additional information.    Type of letter, form or note:  medical    Who is the form from?: Home care -     Where did the form come from: form was faxed in    What clinic location was the form placed at?: [facility] - [phone]    Where the form was placed: [facility]    What number is listed as a contact on the form?: [phone]       Additional comments: PLEASE SIGN AND FAX TO: [phone]    Call taken on 8/27/2018 at 4:13 PM by Arpita Jain

## 2018-08-27 NOTE — TELEPHONE ENCOUNTER
Medication refill information reviewed.     Due date for oxyCODONE (ROXICODONE) 5 MG/5ML solution is     fentaNYL (DURAGESIC) 25 mcg/hr 72 hr patch is 9/8/18     Prescriptions prepped for review.     Will route to provider.

## 2018-08-27 NOTE — TELEPHONE ENCOUNTER
Patient requesting refill(s) of oxyCODONE (ROXICODONE) 5 MG/5ML solution  Last picked up from pharmacy on 8/7/18    fentaNYL (DURAGESIC) 25 mcg/hr 72 hr patch  Last picked up from pharmacy on 8/8/18    Pt last seen by prescribing provider on 6/27/18  Next appt scheduled for 9/19/18     checked in the past 6 months? Yes If no, print current report and give to RN    Last urine drug screen date 4/25/18  Current opioid agreement on file (completed within the last year) Yes Date of opioid agreement: 12/7/18    Processing (pick one)    Mail to [facility]    Will route to nursing pool for review and preparation of prescription(s).

## 2018-08-28 NOTE — TELEPHONE ENCOUNTER
Signed Prescriptions:                        Disp   Refills    naloxone (NARCAN) nasal spray              0.2 mL 0        Sig: Spray 1 spray (4 mg) into one nostril alternating           nostrils as needed for opioid reversal (every 2-3           minutes until assistance arrives.)  Authorizing Provider: BRANDYN JENKINS MD  [facility]

## 2018-08-28 NOTE — TELEPHONE ENCOUNTER
Patients supply of Narcan has . Prescription pended for review. Routing to Dr. Milligan.    YADIRA LazarN, RN  Care Coordinator  [facility] [place]

## 2018-08-29 LAB — SELENIUM SERPL-MCNC: 89 UG/L (ref 23–190)

## 2018-08-29 NOTE — PROGRESS NOTES
This is a recent snapshot of the patient's [facility] medical record.  For current drug dose and complete information and questions, call [phone] or In Basket pool, fv home infusion ([id number])  CSN Number:  [id number]

## 2018-09-01 ENCOUNTER — TRANSFERRED RECORDS (OUTPATIENT)
Dept: HEALTH INFORMATION MANAGEMENT | Facility: CLINIC | Age: 48
End: 2018-09-01

## 2018-09-03 ENCOUNTER — MYC MEDICAL ADVICE (OUTPATIENT)
Dept: FAMILY MEDICINE | Facility: CLINIC | Age: 48
End: 2018-09-03

## 2018-09-03 DIAGNOSIS — R11.0 CHRONIC NAUSEA: Primary | ICD-10-CM

## 2018-09-04 RX ORDER — PROMETHAZINE HYDROCHLORIDE 25 MG/1
TABLET ORAL
Qty: 5 TABLET | Refills: 1 | Status: SHIPPED | OUTPATIENT
Start: 2018-09-04 | End: 2018-09-11

## 2018-09-06 ENCOUNTER — HOME INFUSION (PRE-WILLOW HOME INFUSION) (OUTPATIENT)
Dept: PHARMACY | Facility: CLINIC | Age: 48
End: 2018-09-06

## 2018-09-06 LAB
ALBUMIN SERPL-MCNC: 3.5 G/DL (ref 3.4–5)
ALP SERPL-CCNC: 98 U/L (ref 40–150)
ALT SERPL W P-5'-P-CCNC: 30 U/L (ref 0–70)
ANION GAP SERPL CALCULATED.3IONS-SCNC: 4 MMOL/L (ref 3–14)
AST SERPL W P-5'-P-CCNC: 13 U/L (ref 0–45)
BASOPHILS # BLD AUTO: 0 10E9/L (ref 0–0.2)
BASOPHILS NFR BLD AUTO: 0.1 %
BILIRUB DIRECT SERPL-MCNC: 0.1 MG/DL (ref 0–0.2)
BILIRUB SERPL-MCNC: 0.4 MG/DL (ref 0.2–1.3)
BUN SERPL-MCNC: 12 MG/DL (ref 7–30)
CALCIUM SERPL-MCNC: 8.4 MG/DL (ref 8.5–10.1)
CHLORIDE SERPL-SCNC: 112 MMOL/L (ref 94–109)
CO2 SERPL-SCNC: 30 MMOL/L (ref 20–32)
CREAT SERPL-MCNC: 0.75 MG/DL (ref 0.66–1.25)
DIFFERENTIAL METHOD BLD: NORMAL
EOSINOPHIL # BLD AUTO: 0.2 10E9/L (ref 0–0.7)
EOSINOPHIL NFR BLD AUTO: 2.4 %
ERYTHROCYTE [DISTWIDTH] IN BLOOD BY AUTOMATED COUNT: 13.6 % (ref 10–15)
GFR SERPL CREATININE-BSD FRML MDRD: >90 ML/MIN/1.7M2
GLUCOSE SERPL-MCNC: 40 MG/DL (ref 70–99)
HCT VFR BLD AUTO: 42.4 % (ref 40–53)
HGB BLD-MCNC: 14.3 G/DL (ref 13.3–17.7)
IMM GRANULOCYTES # BLD: 0 10E9/L (ref 0–0.4)
IMM GRANULOCYTES NFR BLD: 0.2 %
LYMPHOCYTES # BLD AUTO: 2.8 10E9/L (ref 0.8–5.3)
LYMPHOCYTES NFR BLD AUTO: 27.5 %
MAGNESIUM SERPL-MCNC: 2.1 MG/DL (ref 1.6–2.3)
MCH RBC QN AUTO: 31.9 PG (ref 26.5–33)
MCHC RBC AUTO-ENTMCNC: 33.7 G/DL (ref 31.5–36.5)
MCV RBC AUTO: 95 FL (ref 78–100)
MONOCYTES # BLD AUTO: 1.2 10E9/L (ref 0–1.3)
MONOCYTES NFR BLD AUTO: 11.5 %
NEUTROPHILS # BLD AUTO: 6 10E9/L (ref 1.6–8.3)
NEUTROPHILS NFR BLD AUTO: 58.3 %
NRBC # BLD AUTO: 0 10*3/UL
NRBC BLD AUTO-RTO: 0 /100
PHOSPHATE SERPL-MCNC: 3.3 MG/DL (ref 2.5–4.5)
PLATELET # BLD AUTO: 172 10E9/L (ref 150–450)
POTASSIUM SERPL-SCNC: 3.1 MMOL/L (ref 3.4–5.3)
PREALB SERPL IA-MCNC: 28 MG/DL (ref 15–45)
PROT SERPL-MCNC: 7.1 G/DL (ref 6.8–8.8)
RBC # BLD AUTO: 4.48 10E12/L (ref 4.4–5.9)
SODIUM SERPL-SCNC: 146 MMOL/L (ref 133–144)
TRIGL SERPL-MCNC: 120 MG/DL
WBC # BLD AUTO: 10.2 10E9/L (ref 4–11)

## 2018-09-06 PROCEDURE — 84100 ASSAY OF PHOSPHORUS: CPT | Performed by: SURGERY

## 2018-09-06 PROCEDURE — 83785 ASSAY OF MANGANESE: CPT | Performed by: SURGERY

## 2018-09-06 PROCEDURE — 82248 BILIRUBIN DIRECT: CPT | Performed by: SURGERY

## 2018-09-06 PROCEDURE — 83735 ASSAY OF MAGNESIUM: CPT | Performed by: SURGERY

## 2018-09-06 PROCEDURE — 80053 COMPREHEN METABOLIC PANEL: CPT | Performed by: SURGERY

## 2018-09-06 PROCEDURE — 84478 ASSAY OF TRIGLYCERIDES: CPT | Performed by: SURGERY

## 2018-09-06 PROCEDURE — 84134 ASSAY OF PREALBUMIN: CPT | Performed by: SURGERY

## 2018-09-06 PROCEDURE — 85025 COMPLETE CBC W/AUTO DIFF WBC: CPT | Performed by: SURGERY

## 2018-09-07 ENCOUNTER — HOME INFUSION (PRE-WILLOW HOME INFUSION) (OUTPATIENT)
Dept: PHARMACY | Facility: CLINIC | Age: 48
End: 2018-09-07

## 2018-09-07 NOTE — PROGRESS NOTES
This is a recent snapshot of the patient's [facility] medical record.  For current drug dose and complete information and questions, call [phone] or In Basket pool, fv home infusion ([id number])  CSN Number:  [id number]

## 2018-09-10 ENCOUNTER — TELEPHONE (OUTPATIENT)
Dept: FAMILY MEDICINE | Facility: CLINIC | Age: 48
End: 2018-09-10

## 2018-09-10 ENCOUNTER — HOME INFUSION (PRE-WILLOW HOME INFUSION) (OUTPATIENT)
Dept: PHARMACY | Facility: CLINIC | Age: 48
End: 2018-09-10

## 2018-09-10 ENCOUNTER — DOCUMENTATION ONLY (OUTPATIENT)
Dept: CARE COORDINATION | Facility: CLINIC | Age: 48
End: 2018-09-10

## 2018-09-10 NOTE — PROGRESS NOTES
This is a recent snapshot of the patient's [facility] medical record.  For current drug dose and complete information and questions, call [phone] or In Basket pool, fv home infusion ([id number])  CSN Number:  [id number]

## 2018-09-10 NOTE — TELEPHONE ENCOUNTER
Jenni from  Home Care. Pulled out his Cm, right chest wall. Pt did use it over the weekend. The cuff is exposed. Told Home care nurse he needs to have line looked at and possible replaced or repositioned. They should not use the line.    Nafisa Aponte, RN, BSN

## 2018-09-11 ENCOUNTER — HOSPITAL ENCOUNTER (EMERGENCY)
Facility: CLINIC | Age: 48
Discharge: HOME | End: 2018-09-12
Attending: FAMILY MEDICINE | Admitting: FAMILY MEDICINE
Payer: COMMERCIAL

## 2018-09-11 ENCOUNTER — HOME INFUSION (PRE-WILLOW HOME INFUSION) (OUTPATIENT)
Dept: PHARMACY | Facility: CLINIC | Age: 48
End: 2018-09-11

## 2018-09-11 ENCOUNTER — TELEPHONE (OUTPATIENT)
Dept: SURGERY | Facility: CLINIC | Age: 48
End: 2018-09-11

## 2018-09-11 ENCOUNTER — MYC MEDICAL ADVICE (OUTPATIENT)
Dept: PALLIATIVE MEDICINE | Facility: CLINIC | Age: 48
End: 2018-09-11

## 2018-09-11 DIAGNOSIS — Z78.9 LACK OF INTRAVENOUS ACCESS: ICD-10-CM

## 2018-09-11 DIAGNOSIS — E86.0 DEHYDRATION: Primary | ICD-10-CM

## 2018-09-11 DIAGNOSIS — G89.29 CHRONIC ABDOMINAL PAIN: ICD-10-CM

## 2018-09-11 DIAGNOSIS — R10.9 CHRONIC ABDOMINAL PAIN: ICD-10-CM

## 2018-09-11 LAB
ALBUMIN UR-MCNC: NEGATIVE MG/DL
ANION GAP SERPL CALCULATED.3IONS-SCNC: 8 MMOL/L (ref 3–14)
APPEARANCE UR: CLEAR
BASOPHILS # BLD AUTO: 0.1 10E9/L (ref 0–0.2)
BASOPHILS NFR BLD AUTO: 0.6 %
BILIRUB UR QL STRIP: NEGATIVE
BUN SERPL-MCNC: 10 MG/DL (ref 7–30)
CALCIUM SERPL-MCNC: 8.1 MG/DL (ref 8.5–10.1)
CHLORIDE SERPL-SCNC: 108 MMOL/L (ref 94–109)
CO2 SERPL-SCNC: 29 MMOL/L (ref 20–32)
COLOR UR AUTO: YELLOW
CREAT SERPL-MCNC: 0.7 MG/DL (ref 0.66–1.25)
DIFFERENTIAL METHOD BLD: ABNORMAL
EOSINOPHIL NFR BLD AUTO: 5.6 %
ERYTHROCYTE [DISTWIDTH] IN BLOOD BY AUTOMATED COUNT: 13.2 % (ref 10–15)
GFR SERPL CREATININE-BSD FRML MDRD: >90 ML/MIN/1.7M2
GLUCOSE SERPL-MCNC: 83 MG/DL (ref 70–99)
GLUCOSE UR STRIP-MCNC: NEGATIVE MG/DL
HCT VFR BLD AUTO: 40 % (ref 40–53)
HGB BLD-MCNC: 13.2 G/DL (ref 13.3–17.7)
HGB UR QL STRIP: NEGATIVE
IMM GRANULOCYTES # BLD: 0 10E9/L (ref 0–0.4)
IMM GRANULOCYTES NFR BLD: 0.2 %
KETONES UR STRIP-MCNC: NEGATIVE MG/DL
LEUKOCYTE ESTERASE UR QL STRIP: NEGATIVE
LYMPHOCYTES # BLD AUTO: 2.7 10E9/L (ref 0.8–5.3)
LYMPHOCYTES NFR BLD AUTO: 33.3 %
MCH RBC QN AUTO: 31.3 PG (ref 26.5–33)
MCHC RBC AUTO-ENTMCNC: 33 G/DL (ref 31.5–36.5)
MCV RBC AUTO: 95 FL (ref 78–100)
MONOCYTES # BLD AUTO: 1 10E9/L (ref 0–1.3)
MONOCYTES NFR BLD AUTO: 12.2 %
NEUTROPHILS # BLD AUTO: 3.9 10E9/L (ref 1.6–8.3)
NEUTROPHILS NFR BLD AUTO: 48.1 %
NITRATE UR QL: NEGATIVE
NRBC # BLD AUTO: 0 10*3/UL
NRBC BLD AUTO-RTO: 0 /100
PH UR STRIP: 7 PH (ref 5–7)
PLATELET # BLD AUTO: 142 10E9/L (ref 150–450)
POTASSIUM SERPL-SCNC: 3.5 MMOL/L (ref 3.4–5.3)
RBC # BLD AUTO: 4.22 10E12/L (ref 4.4–5.9)
SODIUM SERPL-SCNC: 145 MMOL/L (ref 133–144)
SOURCE: NORMAL
SP GR UR STRIP: 1.01 (ref 1–1.03)
UROBILINOGEN UR STRIP-MCNC: 0 MG/DL (ref 0–2)
WBC # BLD AUTO: 8.1 10E9/L (ref 4–11)

## 2018-09-11 PROCEDURE — 25000128 H RX IP 250 OP 636: Performed by: FAMILY MEDICINE

## 2018-09-11 PROCEDURE — 85025 COMPLETE CBC W/AUTO DIFF WBC: CPT | Performed by: FAMILY MEDICINE

## 2018-09-11 PROCEDURE — 81003 URINALYSIS AUTO W/O SCOPE: CPT | Performed by: FAMILY MEDICINE

## 2018-09-11 PROCEDURE — 99285 EMERGENCY DEPT VISIT HI MDM: CPT | Mod: 25 | Performed by: FAMILY MEDICINE

## 2018-09-11 PROCEDURE — 96374 THER/PROPH/DIAG INJ IV PUSH: CPT | Performed by: FAMILY MEDICINE

## 2018-09-11 PROCEDURE — 80048 BASIC METABOLIC PNL TOTAL CA: CPT | Performed by: FAMILY MEDICINE

## 2018-09-11 PROCEDURE — 99285 EMERGENCY DEPT VISIT HI MDM: CPT | Mod: Z6 | Performed by: FAMILY MEDICINE

## 2018-09-11 PROCEDURE — 96361 HYDRATE IV INFUSION ADD-ON: CPT | Performed by: FAMILY MEDICINE

## 2018-09-11 PROCEDURE — 96375 TX/PRO/DX INJ NEW DRUG ADDON: CPT | Performed by: FAMILY MEDICINE

## 2018-09-11 RX ORDER — ONDANSETRON 2 MG/ML
4 INJECTION INTRAMUSCULAR; INTRAVENOUS EVERY 30 MIN PRN
Status: DISCONTINUED | OUTPATIENT
Start: 2018-09-11 | End: 2018-09-12 | Stop reason: HOSPADM

## 2018-09-11 RX ORDER — HYDROMORPHONE HYDROCHLORIDE 1 MG/ML
0.5 INJECTION, SOLUTION INTRAMUSCULAR; INTRAVENOUS; SUBCUTANEOUS ONCE
Status: COMPLETED | OUTPATIENT
Start: 2018-09-11 | End: 2018-09-11

## 2018-09-11 RX ADMIN — SODIUM CHLORIDE 1000 ML: 9 INJECTION, SOLUTION INTRAVENOUS at 22:51

## 2018-09-11 RX ADMIN — Medication 0.5 MG: at 22:57

## 2018-09-11 RX ADMIN — ONDANSETRON 4 MG: 2 INJECTION INTRAMUSCULAR; INTRAVENOUS at 22:55

## 2018-09-11 NOTE — TELEPHONE ENCOUNTER
Returned call and was informed patient has not been able to eat or drink really anything since Saturday and was informed today unable to get hic man changed until next week. To painful to use it. So we discussed him going to ED in [place] and get fluids at a minium and see if they can help him. I will check back in with him on Thursday we Dr. Vyas is in clinic.

## 2018-09-11 NOTE — ED AVS SNAPSHOT
[facility] Emergency Department    [address] [place] [address]    Phone:  [phone]    Fax:  [phone]                                       Parker Acevedo Sr   MRN: [id number]    Department:  [facility] Emergency Department   Date of Visit:  9/11/2018           Patient Information     Date Of Birth          1970        Your diagnoses for this visit were:     Chronic abdominal pain     Lack of intravenous access        You were seen by Darlene Henderson MD.      Follow-up Information     Schedule an appointment as soon as possible for a visit with Francis Vyas MD.    Specialty:  Surgery    Contact information:    [address]  [place] [facility] [address] [phone]          Discharge Instructions       Thank you for giving us the opportunity to see you. We will leave the peripheral iv in place but it will need to be removed by Friday.    Your labs were reassuring today.  Your vital signs were stable.    The following medications were given during your stay: IV fluids, Dilaudid, Zofran    Since you received an opiate pain medication or sedative during your visit, please do not drive for at least 8 hours.     Make arrangements to have the peripheral iv removed and replacement of your Cm catheter.    After discharge, please closely monitor for any new or worsening symptoms. Return to the Emergency Department at any time if your symptoms worsen.        Your next 10 appointments already scheduled     Sep 18, 2018  8:00 AM CDT   Procedure 3.5 hour with U2A ROOM 1   Unit 2A [facility] ([facility])    [address] [address]               Sep 18, 2018  9:30 AM CDT   IR CVC TUNNEL REVISION RIGHT with UUIR2   [facility] ([facility], [facility])    [address] [id number] [phone]           The  day before the exam:   You may eat your regular diet.   You are encouraged to drink at least 8 eight ounce glasses of clear liquids.  Please wear loose clothing, such as a sweat suit or jogging clothes. Avoid snaps, zippers and other metal. We may ask you to undress and put on a hospital gown.  Please bring any scans or X-rays taken at other hospitals, if similar tests were done. Also bring a list of your medicines, including vitamins, minerals and over-the-counter drugs. It is safest to leave personal items at home.  Someone will need to drive you to and from the hospital.  Tell your doctor in advance:   If you have allergies to x-ray contrast or iodine.   If you are or may be pregnant.   If you are taking Coumadin (or any other blood thinners) 5 days prior to the exam for any special instructions.   If you are diabetic to determine if your insulin needs have to be adjusted for the exam.  Your doctor will:   Need to do a history and physical within 30 days before this procedure.   Obtain necessary laboratory tests prior to the exam (Basic Metabolic Panel, CBCP, PTT and INR)   (No labs needed if you are having a tunneled catheter exchange or removal)  If you were given sedation, you cannot drive for 24 hours after the procedure, and an adult must be with you until then.  If you have any questions, please call the Imaging Department where you will have your exam. Directions, parking instructions, and other information are available on our website, [email or web address].            Sep 19, 2018  1:00 PM CDT   Return Visit with Patti Milligan MD   [facility] [place] ([facility])    [address]  [place] [address] [phone]              24 Hour Appointment Hotline       To make an appointment at any [facility], call [phone] ([phone]). If you don't have a family doctor or clinic, we will help you find one. [facility] are conveniently located  "to serve the needs of you and your family.             Review of your medicines      Our records show that you are taking the medicines listed below. If these are incorrect, please call your family doctor or clinic.        Dose / Directions Last dose taken    acetaminophen 500 MG tablet   Commonly known as:  TYLENOL   Dose:  1000 mg        Take 1,000 mg by mouth every 6 hours as needed for fever   Refills:  0        albuterol 108 (90 Base) MCG/ACT inhaler   Commonly known as:  PROAIR HFA/PROVENTIL HFA/VENTOLIN HFA   Dose:  2 puff   Quantity:  1 Inhaler        Inhale 2 puffs into the lungs every 4 hours as needed for shortness of breath / dyspnea or wheezing   Refills:  3        * amphetamine-dextroamphetamine 20 MG per tablet   Commonly known as:  ADDERALL   Dose:  20 mg   Quantity:  30 tablet        Take 1 tablet (20 mg) by mouth daily   Refills:  0        * amphetamine-dextroamphetamine 20 MG per tablet   Commonly known as:  ADDERALL   Dose:  20 mg   Quantity:  30 tablet   Start taking on:  10/7/2018        Take 1 tablet (20 mg) by mouth daily   Refills:  0        COREG 12.5 MG tablet   Dose:  12.5 mg   Generic drug:  carvedilol        Take 12.5 mg by mouth 2 times daily (with meals)   Refills:  0        cyanocobalamin 1000 MCG/ML injection   Commonly known as:  VITAMIN B12   Dose:  1 mL   Quantity:  1 mL        Inject 1 mL (1,000 mcg) into the muscle every 30 days   Refills:  11        [facility] MANAGED PATIENT        Contact [facility] for patient specific medication information at [phone] on admission and discharge from the hospital.  Phones are answered 24 hours a day 7 days a week 365 days a year.  Providers - Choose \"CONTINUE HOME MED (no script)\" at discharge if patient treatment with home infusion will continue.   Refills:  0        fentaNYL 25 mcg/hr 72 hr patch   Commonly known as:  DURAGESIC   Dose:  1 patch   Quantity:  15 patch        Place 1 patch onto the skin every " "48 hours remove old patch.  Release on/after 9/6/18 to start on/after 9/8/18.   Refills:  0        lidocaine 5 % Patch   Commonly known as:  LIDODERM   Dose:  1-2 patch   Quantity:  60 patch        Place 1-2 patches onto the skin every 24 hours Wear for 12 hours, remove for 12 hours.  OK to cut to better fit to size.   Refills:  6        lidocaine-prilocaine cream   Commonly known as:  EMLA   Quantity:  30 g        Apply topically as needed for moderate pain   Refills:  3        naloxone nasal spray   Commonly known as:  NARCAN   Dose:  4 mg   Quantity:  0.2 mL        Spray 1 spray (4 mg) into one nostril alternating nostrils as needed for opioid reversal (every 2-3 minutes until assistance arrives.)   Refills:  0        Needle (Disp) 27G X 1/2\" Misc   Commonly known as:  BD DISP NEEDLES   Dose:  1 Device   Quantity:  3 each        1 Device every 30 days Use for cyanocobalamin injection once q 30 days.   Refills:  4        ondansetron 8 MG ODT tab   Commonly known as:  ZOFRAN-ODT   Dose:  8 mg   Quantity:  90 tablet        Take 1 tablet (8 mg) by mouth every 8 hours as needed for nausea   Refills:  3        * order for DME   Quantity:  12 each        Injection Supplies for Vitamin B12: 3cc syringes w/ 27 gauge needles, 1/2 inch length   Refills:  0        * order for DME   Quantity:  4 each        Equipment being ordered: Bilateral knee high chronic venous insufficiency stockings--  mild-moderate pressures.   Refills:  5        oxyCODONE 5 MG/5ML solution   Commonly known as:  ROXICODONE   Dose:  10-15 mg   Quantity:  1350 mL        Take 10-15 mLs (10-15 mg) by mouth every 4 hours as needed for moderate to severe pain Max of 45 mg/day this month. Weaning dose as discussed. Fill on/after 9/6/18 not to start untill 9/8/18.   Refills:  0        parenteral nutrition - PTA/DISCHARGE ORDER        [facility] to resume previous home TPN but run x 24 hours the first day until labs are ok. Then cycle x 14 hours per previous home " regimen. He has been off TPN x 5 days here.   Refills:  0        sucralfate 1 GM/10ML suspension   Commonly known as:  CARAFATE   Dose:  1 g   Quantity:  1200 mL        Take 10 mLs (1 g) by mouth 4 times daily   Refills:  11        vitamin D 35141 UNIT capsule   Commonly known as:  ERGOCALCIFEROL   Dose:  74749 Units   Quantity:  12 capsule        Take 1 capsule (50,000 Units) by mouth every 7 days   Refills:  3        * Notice:  This list has 4 medication(s) that are the same as other medications prescribed for you. Read the directions carefully, and ask your doctor or other care provider to review them with you.            Procedures and tests performed during your visit     Basic metabolic panel    CBC with platelets differential    Orthostatic blood pressure and pulse    Peripheral IV catheter    UA reflex to Microscopic      Orders Needing Specimen Collection     None      Pending Results     Date and Time Order Name Status Description    9/11/2018 2229 UA reflex to Microscopic In process             Pending Culture Results     Date and Time Order Name Status Description    9/11/2018 2229 UA reflex to Microscopic In process             Pending Results Instructions     If you had any lab results that were not finalized at the time of your Discharge, you can call the ED Lab Result RN at [phone]. You will be contacted by this team for any positive Lab results or changes in treatment. The nurses are available 7 days a week from 10A to 6:30P.  You can leave a message 24 hours per day and they will return your call.        Thank you for choosing [facility]       Thank you for choosing [facility] for your care. Our goal is always to provide you with excellent care. Hearing back from our patients is one way we can continue to improve our services. Please take a few minutes to complete the written survey that you may receive in the mail after you visit with us. Thank you!        MyChart Information     [employer] gives  you secure access to your electronic health record. If you see a primary care provider, you can also send messages to your care team and make appointments. If you have questions, please call your primary care clinic.  If you do not have a primary care provider, please call [phone] and they will assist you.        Care EveryWhere ID     This is your Care EveryWhere ID. This could be used by other organizations to access your Campus medical records  [id number]        Equal Access to Services     KATHRYN GUZMAN : Hadii kameron fregosoo Soheike, waaxda luqadaha, qaybta kaalmada dayami, carmela rizvi. So [facility] [phone].    ATENCIÓN: Si habla español, tiene a hicks disposición servicios gratuitos de asistencia lingüística. Bobame al [phone].    We comply with applicable federal civil rights laws and [place] laws. We do not discriminate on the basis of race, color, national origin, age, disability, sex, sexual orientation, or gender identity.            After Visit Summary       This is your record. Keep this with you and show to your community pharmacist(s) and doctor(s) at your next visit.

## 2018-09-11 NOTE — PROGRESS NOTES
[facility] now requests orders and shares plan of care/discharge summaries for some patients through [employer].  Please REPLY TO THIS MESSAGE OR ROUTE BACK TO THE AUTHOR in order to give authorization for orders when needed.  This is considered a verbal order, you will still receive a faxed copy of orders for signature.  Thank you for your assistance in improving collaboration for our patients.    MD SUMMARY/PLAN OF CARE  1. Very bad abd pains, it's been picking up again to level of when started pain management, like the old days when they were super painful days and also more frequent, longer burst of them, unable to finish all TPNs and hydration due to this. Patient reports has asked for scope however has never recieved call back. Is it possible to have scope done to make sure all is ok?  2. Alot of bloody clots in drainage tube, a lot more than normal, also in vomit, this is associated with 8-10/10 pains he gets  3. Lower left side and back internal pain, also could be kidneys, liver, pancreas or appendix?  4. Had accidently stepped on IV tubing dislodging Cm, ripped stichs, very painful and bloody, called radiology at [facility] and to have scheduled to remove and replace  5. Some days feel cold, also start getting very shaky, blurred vision and very tired/sore joints and feels like sore throat and flu symptoms. However labs were to be redrawn today as last week patient had low glucose, potassium and calcium. Could these cause these symptoms?  6. Very dry flaky skin, brittle nails and hair. Even with showers and tons of lotion. Has not been doing hydration and TPN as directed  7. Started to have barnett time voiding, takes 5 to 10 min to start and if go and stop can not restart  8. Having less BMs  9. If on feet, sitting or standing, they get very swollen, legs, calf and feet, sometimes to point of moderate pounding pain. Educated on importance of elevation to assist with this issue.  10. Increase  in headaches, symptoms that accompy it is ringing in the ears, blurry vision and bight white or yellowish light packman shaped light going across vision and goes away in about 5 to 15 min   11. High Anxiety due to the severity of chronic and acute ABD, back and chest pain. Reports that thiw was very well managed until they started messing with dosages

## 2018-09-11 NOTE — PROGRESS NOTES
Chart and notes from home care reviewed.  Recommendations as follows:    1.  With the progressive abdominal pain, hematemesis noted and also bloody drainage from his GI tube, would highly recommend being seen in the emergency room at the [facility] with immediate evaluation.  They can refer to [facility] for evaluation and likely endoscopy.  Given his complex surgery, should be seen at the [facility].  May need a CT scan with his symptoms.  Full blood studies would be in order.    2.  Symptoms noted of sore throat, arthralgias and flu like symptoms likely not related to low glucose or potassium and calcium levels.  Appears to be an infectious component and could also be evaluated at the time of being seen.    3.  Concerning his urinary voiding, would need a evaluation of the prostate and possibly an ultrasound to check on bladder emptying.    4.  Increase in headaches may very well require CT or MRI given his associated other symptoms noted with his headache.    5.  For increased anxiety could consider individual counseling through [facility] and also possible antianxiety/antidepressant medications.  Would need to be discussed in the clinic.    6.  Dry skin etc. likely from hydration.    Please instruct in the recommendations.    Esteban Daly MD

## 2018-09-11 NOTE — TELEPHONE ENCOUNTER
"Reason for call:  Order   Order or referral being requested: IV Fluids  Reason for request: Wants to see if it can be increased  Date needed: as soon as possible  Has the patient been seen by the PCP for this problem? Patient's \"hicman\" is painful and has not had fluids since Saturday    Additional comments: Wants a call back as soon as possible    Phone number to reach patient:  Other phone number:  [phone]    Best Time:  Before the end of the day    Can we leave a detailed message on this number?  Not Applicable     Have you had Bariatric surgery in the past?  YES    If No, disregard and delete the following questions.       If YES, when?  2016     What hospital? N/A                                      What was the name of the surgery? Gastric Tube    Name of surgeon?  N/A        "

## 2018-09-11 NOTE — ED AVS SNAPSHOT
[facility] Emergency Department    [address] [place] [address]    Phone:  [phone]    Fax:  [phone]                                       Parker Acevedo Sr   MRN: [id number]    Department:  [facility] Emergency Department   Date of Visit:  9/11/2018           After Visit Summary Signature Page     I have received my discharge instructions, and my questions have been answered. I have discussed any challenges I see with this plan with the nurse or doctor.    ..........................................................................................................................................  Patient/Patient Representative Signature      ..........................................................................................................................................  Patient Representative Print Name and Relationship to Patient    ..................................................               ................................................  Date                                   Time    ..........................................................................................................................................  Reviewed by Signature/Title    ...................................................              ..............................................  Date                                               Time          22EPIC Rev 08/18

## 2018-09-11 NOTE — PROGRESS NOTES
This is a recent snapshot of the patient's [facility] medical record.  For current drug dose and complete information and questions, call [phone] or In Basket pool, [facility] ([id number])  CSN Number:  [id number]

## 2018-09-12 ENCOUNTER — CARE COORDINATION (OUTPATIENT)
Dept: SURGERY | Facility: CLINIC | Age: 48
End: 2018-09-12

## 2018-09-12 ENCOUNTER — HOME INFUSION (PRE-WILLOW HOME INFUSION) (OUTPATIENT)
Dept: PHARMACY | Facility: CLINIC | Age: 48
End: 2018-09-12

## 2018-09-12 ENCOUNTER — PATIENT OUTREACH (OUTPATIENT)
Dept: CARE COORDINATION | Facility: CLINIC | Age: 48
End: 2018-09-12

## 2018-09-12 VITALS
BODY MASS INDEX: 23.85 KG/M2 | RESPIRATION RATE: 20 BRPM | TEMPERATURE: 98.2 F | OXYGEN SATURATION: 97 % | SYSTOLIC BLOOD PRESSURE: 118 MMHG | WEIGHT: 171 LBS | DIASTOLIC BLOOD PRESSURE: 80 MMHG

## 2018-09-12 ASSESSMENT — ACTIVITIES OF DAILY LIVING (ADL): DEPENDENT_IADLS:: MEDICATION MANAGEMENT;TRANSPORTATION;SHOPPING

## 2018-09-12 NOTE — ED TRIAGE NOTES
Report he stepped on his TPN line on Saturday and pulled out his azevedo cuff. Called homecare and [facility] and was told not to use it. States he is scheduled to have it replaced on Tuesday sept 18. Pt has not been getting his TPN and was told to come in and get an IV placed.

## 2018-09-12 NOTE — PROGRESS NOTES
Clinic Care Coordination Contact    Clinic Care Coordination Contact  OUTREACH    Referral Information:  Referral Source: IP Report    Primary Diagnosis: SIRS/Sepsis    Chief Complaint   Patient presents with     Clinic Care Coordination - Post Hospital     RN ED f/u        [place] Utilization:   Clinic Utilization  Difficulty keeping appointments:: No  Compliance Concerns: Yes  No-Show Concerns: No  No PCP office visit in Past Year: No  Utilization    Last refreshed: 9/12/2018 11:32 AM:  No Show Count (past year) 3       Last refreshed: 9/12/2018 11:32 AM:  ED visits 4       Last refreshed: 9/12/2018 11:32 AM:  Hospital admissions 3          Current as of: 9/12/2018 11:32 AM             Clinical Concerns:  Current Medical Concerns:  Patient was at [facility] ED 9/11/18 due to no IV access.  Patient's Cm line was pulled the previous weekend and he had been without TPN or IV hydration.  The ED inserted a peripheral IV to be used at home for IV hydration until patient can have his Cm replaced on 9/18/18, which is the soonest [facility] IR is able to schedule this for patient.  CHRISTY JERRY spoke with Mo at [facility] IR scheduling who informed RN CC that even if patient presented to [facility] ED he may not have his access placed sooner as they prioritize IR staff for emergencies only on the hospital side.  RN CC spoke with [facility] Scheduling staff who states they do not perform these procedures at their facility.  RN CC spoke with [place] [facility] who advised for patient to keep his [facility]  appointment on 9/18/18 due to patient requiring anesthesia for his Cm placement.  RN CC spoke with patient's spouse who states patient currently has a peripheral IV and she and patient are fine managing this as long as patient can have a new line placed on 9/14/18.  Spouse Rose states otherwise patient seems to be doing well.  She has not scheduled an appointment with a new PCP, but states  she and patient will continue to work on this.  RN CC spoke with home care Manager Yamila Ordoñez who states home care is able to replace the peripheral IV on 9/14/18 as long as there are orders given to patient's [facility] plan of care orders stating patient may have peripheral IV insertion/management for hydration.  RN CC left a voicemail for Dr. Vyas's nurse to discuss the above information.  RN CLARITZA spoke with [facility] and informed them of the above information.  Patient Active Problem List   Diagnosis     Peptic ulcer disease     Gastric bypass status for obesity     Chronic abdominal pain     Vomiting     Anemia     ADHD (attention deficit hyperactivity disorder), inattentive type     Vitamin B12 deficiency without anemia     Thiamine deficiency     Dehydration     Dysphagia     Weight loss, non-intentional     Malnutrition (H)     Chronic anxiety     Constipation     Bile reflux esophagitis     Former smoker     Vitamin D deficiency     Iron deficiency     Health Care Home     Chronic pain     Insomnia     Positive blood culture     Fungemia     Chronic nausea     Gastrostomy tube in place (H)     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)     Short gut syndrome     Anxiety     Status post cervical spinal arthrodesis     Port or reservoir infection, initial encounter     Abnormal echocardiogram     Low serum iron     Anemia, iron deficiency     Catheter-related bloodstream infection (CRBSI)     Generalized weakness     S/P bariatric surgery     Hyperlipidemia LDL goal <130          Current Behavioral Concerns: Patient has ADHD and anxiety managed through his PCP.      Education Provided to patient: RN CC reviewed ED discharge instructions and importance of establishing with a PCP.   Pain  Pain (GOAL):: Yes  Type: Chronic (>3mo)  Location of chronic pain:: chronic abdominal pain and lower back pain  Radiating: Yes  Progression: Worsening  Limitation of routine activities due to  chronic pain:: Yes  Description: Unable to perform most daily activities (chores, hobbies, social activities, driving)  Alleviating Factors: Pain Medication, Rest, Heat  Health Maintenance Reviewed:    Clinical Pathway: None    Medication Management:  Patient's wife Rose assists with medication management.     Functional Status:  Dependent ADLs:: Ambulation-cane, Bathing  Dependent IADLs:: Medication Management, Transportation, Shopping  Bed or wheelchair confined:: No  Mobility Status: Independent    Living Situation:  Current living arrangement:: I live in a private home with spouse  Type of residence:: Private home - stairs    Diet/Exercise/Sleep:  Diet:: Regular (But also has TPN)  Inadequate nutrition (GOAL):: No  Food Insecurity: No  Tube Feeding: No  Exercise:: Yes  Inadequate activity/exercise (GOAL):: No  Significant changes in sleep pattern (GOAL): No    Transportation:  Transportation concerns (GOAL):: No  Transportation means:: Regular car     Psychosocial:  Samaritan or spiritual beliefs that impact treatment:: No  Mental health DX:: Yes  Mental health DX how managed:: Medication  Mental health management concern (GOAL):: No  Informal Support system:: Family, Spouse     Financial/Insurance:   Financial/Insurance concerns (GOAL):: No       Resources and Interventions:  Current Resources:    ;   Community Resources: Home Infusion, Home Care  Supplies used at home:: None  Equipment Currently Used at Home: cane, straight, shower chair    Advance Care Plan/Directive  Advanced Care Plans/Directives on file:: Yes  Type Advanced Care Plans/Directives: Advanced Directive - On File  Advanced Care Plan/Directive Status: Not Applicable    Referrals Placed: None     Goals:   Goals        General    Medical (pt-stated)     Notes - Note created  8/14/2018 12:26 PM by Behl, Melissa K, RN    Goal Statement: I will establish care with a new PCP prior to my current PCP's detention.  Measure of Success: Patient will  schedule an attend an appointment with a new PCP.  Supportive Steps to Achieve: RNCC reviewed [facility] locations.  Barriers: unknown  Strengths: has support from spouse  Date to Achieve By: 11/9/18  Patient expressed understanding of goal: yes                 Patient/Caregiver understanding: Spouse had fair understanding of current plan of care.    Outreach Frequency: monthly  Future Appointments              In 6 days U2A ROOM 1 Unit 2A [facility] O    [facility]    In 1 week Patti Milligan MD [facility], [facility]          Plan:   1. [facility] will collaborate with Dr. Vyas's office and [facility] in ensuring patient has a peripheral line to continue receiving IV hydration until his Cm can be replaced as scheduled on 9/18/18.  2. Patient will have Cm replaced as scheduled on 9/18/18.  3. Patient will schedule an appointment with a PCP.  4. RN CC will follow up with patient in 2 weeks.    Melissa Behl BSN, RN, PHN  [facility] Care Coordinator  [phone]

## 2018-09-12 NOTE — PROGRESS NOTES
Called and talked with IR on call and they stated they had not heard yet that patient was not able to get nourishment through his hic man. I explained that I had talked to home care and patient and he has not used it since 9/8/18 and had not been drinking much in the last 2 days. I instructed him to go to ED and be evaluated for dehydration. Patient went and ended up with peripheral IV placed to help until he can get hic man placed. This was all explained but on call still wanted patient to call and discuss s/s. I then called patient and instructed them to call and talk with IR to get scheduled sooner then 9/18 do to hydration and malnutrition issues.    Will follow up 9/13 and see if patient is getting hic man replaced sooner.

## 2018-09-12 NOTE — ED PROVIDER NOTES
[facility] ED Provider Note   CC:     Chief Complaint   Patient presents with     Vascular Access Problem     HPI:  Parker Acevedo Sr is a 47 year old male who presented to the emergency department needing peripheral IV access for IV hydration due to dehydration.  Patient has a history of complex abdominal surgeries including bariatric surgery, with chronic abdominal pain, peptic ulcer disease, and possibly chronic pancreatitis.  Patient has malnutrition and nutritional deficiency, and has both a gastrostomy port which is being held.  He has a current Cm in the right upper chest that was dislodged and he was told not to use it.  He was receiving TPN until Saturday when the Cm tube became dislodged.  They have been trying to set up outpatient home health to come and establish IV access.  He has had prior PICC lines and several Cm catheters due to catheter related bloodstream infections.  Patient states that he has short gut syndrome as well.  His pain is typically at a 7/10 level, and current pain level is 9/10.  He is on 10-50 mg of oxycodone every 4 hours.  Patient is scheduled to have replacement of his Cm catheter next Tuesday.  He is being followed by Dr. Vyas and Dr. Daly at the [facility].    Problem List:  Patient Active Problem List    Diagnosis     Hyperlipidemia LDL goal <130     S/P bariatric surgery     Generalized weakness     Catheter-related bloodstream infection (CRBSI)     Low serum iron     Anemia, iron deficiency     Abnormal echocardiogram     Port or reservoir infection, initial encounter     Status post cervical spinal arthrodesis     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)     Short gut syndrome     Anxiety     Gastrostomy tube in place (H)     Chronic nausea     Fungemia     Positive blood culture     Insomnia     Chronic pain     Health Care Home     Iron deficiency     Vitamin  D deficiency     Former smoker     Bile reflux esophagitis     Constipation     Chronic anxiety     Dysphagia     Weight loss, non-intentional     Malnutrition (H)     Dehydration     Vitamin B12 deficiency without anemia     Thiamine deficiency     ADHD (attention deficit hyperactivity disorder), inattentive type     Anemia     Vomiting     Chronic abdominal pain     Peptic ulcer disease     Gastric bypass status for obesity       MEDS:   Discharge Medication List as of 9/11/2018 11:58 PM      CONTINUE these medications which have NOT CHANGED    Details   acetaminophen (TYLENOL) 500 MG tablet Take 1,000 mg by mouth every 6 hours as needed for fever, Historical      albuterol (PROAIR HFA/PROVENTIL HFA/VENTOLIN HFA) 108 (90 Base) MCG/ACT Inhaler Inhale 2 puffs into the lungs every 4 hours as needed for shortness of breath / dyspnea or wheezing, Disp-1 Inhaler, R-3, E-Prescribe      !! amphetamine-dextroamphetamine (ADDERALL) 20 MG per tablet Take 1 tablet (20 mg) by mouth daily, Disp-30 tablet, R-0, Local Print      carvedilol (COREG) 12.5 MG tablet Take 12.5 mg by mouth 2 times daily (with meals), Historical      cyanocobalamin (VITAMIN B12) 1000 MCG/ML injection Inject 1 mL (1,000 mcg) into the muscle every 30 days, Disp-1 mL, R-11, E-Prescribe      fentaNYL (DURAGESIC) 25 mcg/hr 72 hr patch Place 1 patch onto the skin every 48 hours remove old patch.  Release on/after 9/6/18 to start on/after 9/8/18., Disp-15 patch, R-0, Local Print      lidocaine (LIDODERM) 5 % Patch Place 1-2 patches onto the skin every 24 hours Wear for 12 hours, remove for 12 hours.  OK to cut to better fit to size.Disp-60 patch, [id number]      naloxone (NARCAN) nasal spray Spray 1 spray (4 mg) into one nostril alternating nostrils as needed for opioid reversal (every 2-3 minutes until assistance arrives.), Disp-0.2 mL, R-0, E-Prescribe      ondansetron (ZOFRAN-ODT) 8 MG ODT tab Take 1 tablet (8 mg) by mouth every 8 hours as needed for  "nausea, Disp-90 tablet, R-3, E-Prescribe      !! parenteral nutrition - PTA/DISCHARGE ORDER [facility] to resume previous home TPN but run x 24 hours the first day until labs are ok. Then cycle x 14 hours per previous home regimen. He has been off TPN x 5 days here., Historical      sucralfate (CARAFATE) 1 GM/10ML suspension Take 10 mLs (1 g) by mouth 4 times daily, Disp-1200 mL, R-11, E-Prescribe      vitamin D (ERGOCALCIFEROL) 65540 UNIT capsule Take 1 capsule (50,000 Units) by mouth every 7 days, Disp-12 capsule, R-3, E-Prescribe      !! amphetamine-dextroamphetamine (ADDERALL) 20 MG per tablet Take 1 tablet (20 mg) by mouth daily, Disp-30 tablet, R-0, Local Print      !! [facility] INFUSION MANAGED PATIENT Contact [facility] for patient specific medication information at [phone] on admission and discharge from the hospital.  Phones are answered 24 hours a day 7 days a week 365 days a year.    Providers - Choose \"CONTINUE HOME MED (no scr ipt)\" at discharge if patient treatment with home infusion will continue., No Print OutResume prior to admission TPN/Lipids/saline      lidocaine-prilocaine (EMLA) cream Apply topically as needed for moderate painDisp-30 g, [id number]      Needle, Disp, (BD DISP NEEDLES) 27G X 1/2\" MISC 1 Device every 30 days Use for cyanocobalamin injection once q 30 days., Disp-3 each, R-4, E-Prescribe      !! order for DME Equipment being ordered: Bilateral knee high chronic venous insufficiency stockings--  mild-moderate pressures.Disp-4 each, R-5, Local Print      !! order for DME Injection Supplies for Vitamin B12: 3cc syringes w/ 27 gauge needles, 1/2 inch lengthDisp-12 each, R-0, Local Print      oxyCODONE (ROXICODONE) 5 MG/5ML solution Take 10-15 mLs (10-15 mg) by mouth every 4 hours as needed for moderate to severe pain Max of 45 mg/day this month. Weaning dose as discussed. Fill on/after 9/6/18 not to start untill 9/8/18., Disp-1350 mL, R-0, Local Print       !! - " Potential duplicate medications found. Please discuss with provider.          ALLERGIES:    Allergies   Allergen Reactions     Bactrim [Sulfamethoxazole W/Trimethoprim] Rash     Penicillins Anaphylaxis     Doxycycline Rash     Vancomycin Rash     Rash after receiving vancomycin 3/28/16 (red man's?). Tolerated with slower infusion and diphenhydramine premed.       Past Surgical History:   Procedure Laterality Date     AMPUTATION       APPENDECTOMY       BACK SURGERY  11/3/2014    curve in the spine     BIOPSY LYMPH NODE CERVICAL N/A 2/20/2015    Procedure: BIOPSY LYMPH NODE CERVICAL;  Surgeon: Baron Scanlon MD;  Location: [facility] OR     C GASTRIC BYPASS,OBESE<100CM SHAYLEE-EN-Y  2002    lost 300 pounds     CHOLECYSTECTOMY       DISCECTOMY, FUSION CERVICAL ANTERIOR ONE LEVEL, COMBINED N/A 2/15/2017    Procedure: COMBINED DISCECTOMY, FUSION CERVICAL ANTERIOR ONE LEVEL;  Surgeon: Darren Campos MD;  Location: [facility] OR     ENDOSCOPIC INSERTION TUBE GASTROSTOMY  9/9/2013    Procedure: ENDOSCOPIC INSERTION TUBE GASTROSTOMY;;  Surgeon: Francis Vays MD;  [facility]     ENDOSCOPIC ULTRASOUND UPPER GASTROINTESTINAL TRACT (GI)  4/29/2011    Procedure:ENDOSCOPIC ULTRASOUND UPPER GASTROINTESTINAL TRACT (GI); Both Procedures done Conjointly; Surgeon:NEREIDA HOUSER; Location:[facility]     ENDOSCOPIC ULTRASOUND UPPER GASTROINTESTINAL TRACT (GI)  9/9/2013    Procedure: ENDOSCOPIC ULTRASOUND UPPER GASTROINTESTINAL TRACT (GI);  Endoscopic Ultrasound Guide Gastrostomy Tube Placement  C-arm;  Surgeon: Noe Lizarraga MD;  [facility]: [facility] OR     ENDOSCOPY  03/25/11    EGD, [facility]     ENDOSCOPY  08/04/09    Upper Endoscopy, [facility]     ENDOSCOPY  01/05/09    Upper Endoscopy, [facility]     ESOPHAGOSCOPY, GASTROSCOPY, DUODENOSCOPY (EGD), COMBINED  4/20/2011    Procedure:COMBINED ESOPHAGOSCOPY, GASTROSCOPY, DUODENOSCOPY (EGD); Surgeon:FRANCIS VYAS; [facility]     ESOPHAGOSCOPY,  GASTROSCOPY, DUODENOSCOPY (EGD), COMBINED  6/15/2011    Procedure:COMBINED ESOPHAGOSCOPY, GASTROSCOPY, DUODENOSCOPY (EGD); Surgeon:FRANCIS VYAS; Location: GI     ESOPHAGOSCOPY, GASTROSCOPY, DUODENOSCOPY (EGD), COMBINED  6/12/2013    Procedure: COMBINED ESOPHAGOSCOPY, GASTROSCOPY, DUODENOSCOPY (EGD);;  Surgeon: Francis Vyas MD;  Location:  GI     ESOPHAGOSCOPY, GASTROSCOPY, DUODENOSCOPY (EGD), COMBINED  11/22/2013    Procedure: COMBINED ESOPHAGOSCOPY, GASTROSCOPY, DUODENOSCOPY (EGD);;  Surgeon: Francis Vyas MD;  Location:  OR     ESOPHAGOSCOPY, GASTROSCOPY, DUODENOSCOPY (EGD), COMBINED  4/30/2014    Procedure: COMBINED ESOPHAGOSCOPY, GASTROSCOPY, DUODENOSCOPY (EGD);  Surgeon: Francis Vyas MD;  Location:  GI     ESOPHAGOSCOPY, GASTROSCOPY, DUODENOSCOPY (EGD), COMBINED N/A 2/20/2015    Procedure: COMBINED ESOPHAGOSCOPY, GASTROSCOPY, DUODENOSCOPY (EGD), BIOPSY SINGLE OR MULTIPLE;  Surgeon: Baron Scanlon MD;  Location:  OR     ESOPHAGOSCOPY, GASTROSCOPY, DUODENOSCOPY (EGD), COMBINED N/A 9/30/2015    Procedure: COMBINED ESOPHAGOSCOPY, GASTROSCOPY, DUODENOSCOPY (EGD);  Surgeon: Francis Vyas MD;  Location:  GI     GASTRECTOMY  6/22/2012    Procedure: GASTRECTOMY;  Open Approach, Excise Ulcers,Partial Gastrectomy, Esophagojejunostomy, Hiatal Hernia Repair, Extensive Lysis of Adhesions and Esaphagogastrodudenoscopy.;  Surgeon: Francis Vyas MD;  [facility]     GASTROJEJUNOSTOMY  08/26/09    Extensice enterolysis, partial resect. jejunum, part. resect gastric pouch, gastrojejunostomy anastomosis     HC ESOPH/GAS REFLUX TEST W NASAL IMPED ELECTRODE  8/5/2013    Procedure: ESOPHAGEAL IMPEDENCE FUNCTION TEST 1 HOUR OR LESS;  Surgeon: Halie Lang MD;  Location: [facility] GI     HEAD & NECK SURGERY  2/15/2017    C5-C6     HERNIA REPAIR  2006    Umbilical hernia     HERNIORRHAPHY HIATAL  6/22/2012    Procedure: HERNIORRHAPHY HIATAL;;  Surgeon: Lilliam  Francis Castanon MD;  [facility]     HERNIORRHAPHY INGUINAL  11/22/2013    Procedure: HERNIORRHAPHY INGUINAL;;  Surgeon: Francsi Vyas MD;  [facility]     INSERT PORT VASCULAR ACCESS Right 12/19/2017    Procedure: INSERT PORT VASCULAR ACCESS;  Right Chest Port Placement ;  Surgeon: Lisandro Alejandro PA-C;  [facility]     INSERT PORT VASCULAR ACCESS Right 8/2/2018    Procedure: INSERT PORT VASCULAR ACCESS;  Place single lumen tunneled central venous access catheter;  Surgeon: Guy Jamil PA-C;  [facility]     LAPAROTOMY EXPLORATORY  11/22/2013    Procedure: LAPAROTOMY EXPLORATORY;  Exploratory Laparotomy, Upper Endoscopy, Left Inguinal Hernia Repair;  Surgeon: Francis Vyas MD;  [facility]     ORTHOPEDIC SURGERY       PICC INSERTION Right 03/16/2017    5fr DL BioFlo PICC, 42cm (3cm external) in the R medial brachial vein w/ tip in the SVC RA junction.     PICC INSERTION Left 09/23/2017    5fr DL BioFlo PICC, 45cm (1cm external) in the L basilic vein w/ tip in the SVC RA junction.     SHAYLEE EN Y BOWEL  2003     SOFT TISSUE SURGERY       SOFT TISSUE SURGERY       THORACIC SURGERY       TONSILLECTOMY         Social History   Substance Use Topics     Smoking status: Current Some Day Smoker     Packs/day: 0.10     Years: 3.00     Types: Cigarettes     Last attempt to quit: 7/28/2018     Smokeless tobacco: Never Used      Comment: I use an e cig every now and than     Alcohol use No      Comment: quit          Review of Systems   Except as noted in HPI, all other systems were reviewed and are negative    Physical Exam     Vitals were reviewed  Patient Vitals for the past 8 hrs:   BP Temp Temp src Heart Rate Resp SpO2 Weight   09/12/18 0006 118/80 98.2  F (36.8  C) Oral 57 20 97 % -   09/11/18 2337 - - - - - 98 % -   09/11/18 2335 121/78 - - - - 98 % -   09/11/18 2301 129/83 - - - - 97 % -   09/11/18 2045 127/84 98.1  F (36.7  C) Oral 65 20 97 % 77.6 kg (171 lb)      GENERAL APPEARANCE: Alert, mild distress due to reported abdominal pain  FACE: normal facies  EYES: Pupils are equal  HENT: normal external exam  NECK: no adenopathy or asymmetry  CHEST: Several scars in the right upper chest; there is a IV access line that appears to be displaced and somewhat tender.  There is no significant redness or drainage from the port site.  RESP: normal respiratory effort; clear breath sounds bilaterally  CV: regular rate and rhythm; no significant murmurs, gallops or rubs  ABD: soft, epigastric tenderness; gastrostomy port in place  EXT: No calf tenderness or pitting edema  SKIN: no worrisome rash  NEURO: no facial droop; no focal deficits, speech is normal        Available Lab/Imaging Results     Results for orders placed or performed during the hospital encounter of 09/11/18 (from the past 24 hour(s))   CBC with platelets differential   Result Value Ref Range    WBC 8.1 4.0 - 11.0 10e9/L    RBC Count 4.22 (L) 4.4 - 5.9 10e12/L    Hemoglobin 13.2 (L) 13.3 - 17.7 g/dL    Hematocrit 40.0 40.0 - 53.0 %    MCV 95 78 - 100 fl    MCH 31.3 26.5 - 33.0 pg    MCHC 33.0 31.5 - 36.5 g/dL    RDW 13.2 10.0 - 15.0 %    Platelet Count 142 (L) 150 - 450 10e9/L    Diff Method Automated Method     % Neutrophils 48.1 %    % Lymphocytes 33.3 %    % Monocytes 12.2 %    % Eosinophils 5.6 %    % Basophils 0.6 %    % Immature Granulocytes 0.2 %    Nucleated RBCs 0 0 /100    Absolute Neutrophil 3.9 1.6 - 8.3 10e9/L    Absolute Lymphocytes 2.7 0.8 - 5.3 10e9/L    Absolute Monocytes 1.0 0.0 - 1.3 10e9/L    Absolute Basophils 0.1 0.0 - 0.2 10e9/L    Abs Immature Granulocytes 0.0 0 - 0.4 10e9/L    Absolute Nucleated RBC 0.0    Basic metabolic panel   Result Value Ref Range    Sodium 145 (H) 133 - 144 mmol/L    Potassium 3.5 3.4 - 5.3 mmol/L    Chloride 108 94 - 109 mmol/L    Carbon Dioxide 29 20 - 32 mmol/L    Anion Gap 8 3 - 14 mmol/L    Glucose 83 70 - 99 mg/dL    Urea Nitrogen 10 7 - 30 mg/dL    Creatinine  0.70 0.66 - 1.25 mg/dL    GFR Estimate >90 >60 mL/min/1.7m2    GFR Estimate If Black >90 >60 mL/min/1.7m2    Calcium 8.1 (L) 8.5 - 10.1 mg/dL   UA reflex to Microscopic   Result Value Ref Range    Color Urine Yellow     Appearance Urine Clear     Glucose Urine Negative NEG^Negative mg/dL    Bilirubin Urine Negative NEG^Negative    Ketones Urine Negative NEG^Negative mg/dL    Specific Gravity Urine 1.006 1.003 - 1.035    Blood Urine Negative NEG^Negative    pH Urine 7.0 5.0 - 7.0 pH    Protein Albumin Urine Negative NEG^Negative mg/dL    Urobilinogen mg/dL 0.0 0.0 - 2.0 mg/dL    Nitrite Urine Negative NEG^Negative    Leukocyte Esterase Urine Negative NEG^Negative    Source Midstream Urine      *Note: Due to a large number of results and/or encounters for the requested time period, some results have not been displayed. A complete set of results can be found in Results Review.                Impression     Final diagnoses:   Chronic abdominal pain   Lack of intravenous access         ED Course & Medical Decision Making   Parker Acevedo Sr is a 47 year old male who presented to the emergency department with lack of IV access, and possible dehydration.  Patient has a history of chronic abdominal pain with a complex past medical and surgical history.  He is currently followed by specialists at the [facility].  He is scheduled for September 18 to have a replacement of his Cm catheter.  He also has a gastrostomy tube that is not being used due to his recent peptic ulcer, and continued inability to eat her digest food due to his chronic abdominal pain.  There was concern for possible dehydration and he was told to come to the emergency department to try to receive IV fluids and peripheral IV access.  He does have some hydration fluids at home that he can continue.  Patient states his current pain level is 9/10 and his baseline level of 7/10.  He received a liter of normal saline, Zofran, and a single  dose of Dilaudid.  Pain was down to 6/10.  The patient's workup reveals a stable CBC and a basic metabolic panel that has improved compared to the previous one on September 6.  His potassium level is normal now and his sodium is down slightly to 145.  The patient has no significant orthostatic changes.  Urinalysis was collected and reveals specific gravity 1.006.  Patient is medically stable for discharge home.  He has a peripheral IV in the left forearm that will be wrapped up and left in place.  Patient will contact his home health care team and this will need to be removed by Friday.  I asked him to contact his specialist at the [facility] tomorrow to see if his Cm catheter can be replaced sooner.               Written after-visit summary and instructions were given at the time of discharge.      Discharge Medication List as of 9/11/2018 11:58 PM               This note was dictated using electronic voice recognition software and although reviewed, may contain grammatical and spelling errors.        Darlene Henderson MD  09/12/18 0046

## 2018-09-12 NOTE — PROGRESS NOTES
This is a recent snapshot of the patient's [facility] medical record.  For current drug dose and complete information and questions, call [phone] or In [facility] pool, [facility] home infusion ([id number])  CSN Number:  [id number]

## 2018-09-12 NOTE — DISCHARGE INSTRUCTIONS
Thank you for giving us the opportunity to see you. We will leave the peripheral iv in place but it will need to be removed by Friday.    Your labs were reassuring today.  Your vital signs were stable.    The following medications were given during your stay: IV fluids, Dilaudid, Zofran    Since you received an opiate pain medication or sedative during your visit, please do not drive for at least 8 hours.     Make arrangements to have the peripheral iv removed and replacement of your Cm catheter.    After discharge, please closely monitor for any new or worsening symptoms. Return to the Emergency Department at any time if your symptoms worsen.

## 2018-09-13 ENCOUNTER — TELEPHONE (OUTPATIENT)
Dept: INTERVENTIONAL RADIOLOGY/VASCULAR | Facility: CLINIC | Age: 48
End: 2018-09-13

## 2018-09-13 NOTE — PROGRESS NOTES
This is a recent snapshot of the patient's [facility] medical record.  For current drug dose and complete information and questions, call [phone] or In [facility] pool, [facility] home infusion ([id number])  CSN Number:  [id number]

## 2018-09-13 NOTE — PROGRESS NOTES
Clinic Care Coordination Contact  Care Team Conversations    RN CC received a voicemail from patient's spouse (consent to communicate on file) from 9/12/18 at 4:57 pm stating patient's IV just blew when she went to connect him to hydration.    RN CC attempted to call patient and spouse, however, there was no answer.  RN CC left a voicemail requesting a call back.    Melissa Behl BSN, RN, PHN  [facility] Care Coordinator  [phone]

## 2018-09-14 LAB — MANGANESE BLD-MCNC: NORMAL NG/ML

## 2018-09-18 ENCOUNTER — HOSPITAL ENCOUNTER (OUTPATIENT)
Facility: CLINIC | Age: 48
Discharge: HOME | End: 2018-09-18
Attending: RADIOLOGY | Admitting: RADIOLOGY
Payer: COMMERCIAL

## 2018-09-18 ENCOUNTER — APPOINTMENT (OUTPATIENT)
Dept: MEDSURG UNIT | Facility: CLINIC | Age: 48
End: 2018-09-18
Attending: RADIOLOGY
Payer: COMMERCIAL

## 2018-09-18 ENCOUNTER — APPOINTMENT (OUTPATIENT)
Dept: INTERVENTIONAL RADIOLOGY/VASCULAR | Facility: CLINIC | Age: 48
End: 2018-09-18
Attending: PHYSICIAN ASSISTANT
Payer: COMMERCIAL

## 2018-09-18 ENCOUNTER — HOME INFUSION (PRE-WILLOW HOME INFUSION) (OUTPATIENT)
Dept: PHARMACY | Facility: CLINIC | Age: 48
End: 2018-09-18

## 2018-09-18 VITALS
HEIGHT: 71 IN | RESPIRATION RATE: 16 BRPM | OXYGEN SATURATION: 99 % | SYSTOLIC BLOOD PRESSURE: 138 MMHG | BODY MASS INDEX: 23.94 KG/M2 | TEMPERATURE: 98.3 F | HEART RATE: 54 BPM | WEIGHT: 171 LBS | DIASTOLIC BLOOD PRESSURE: 84 MMHG

## 2018-09-18 DIAGNOSIS — E86.0 DEHYDRATION: ICD-10-CM

## 2018-09-18 LAB
ANION GAP SERPL CALCULATED.3IONS-SCNC: 4 MMOL/L (ref 3–14)
BUN SERPL-MCNC: 8 MG/DL (ref 7–30)
CALCIUM SERPL-MCNC: 8 MG/DL (ref 8.5–10.1)
CHLORIDE SERPL-SCNC: 109 MMOL/L (ref 94–109)
CO2 SERPL-SCNC: 30 MMOL/L (ref 20–32)
CREAT SERPL-MCNC: 0.61 MG/DL (ref 0.66–1.25)
ERYTHROCYTE [DISTWIDTH] IN BLOOD BY AUTOMATED COUNT: 13.2 % (ref 10–15)
GFR SERPL CREATININE-BSD FRML MDRD: >90 ML/MIN/1.7M2
GLUCOSE SERPL-MCNC: 96 MG/DL (ref 70–99)
HCT VFR BLD AUTO: 37.7 % (ref 40–53)
HGB BLD-MCNC: 12.5 G/DL (ref 13.3–17.7)
INR PPP: 1.11 (ref 0.86–1.14)
MAGNESIUM SERPL-MCNC: 1.9 MG/DL (ref 1.6–2.3)
MCH RBC QN AUTO: 31.3 PG (ref 26.5–33)
MCHC RBC AUTO-ENTMCNC: 33.2 G/DL (ref 31.5–36.5)
MCV RBC AUTO: 95 FL (ref 78–100)
PHOSPHATE SERPL-MCNC: 3.4 MG/DL (ref 2.5–4.5)
PLATELET # BLD AUTO: 132 10E9/L (ref 150–450)
POTASSIUM SERPL-SCNC: 3.3 MMOL/L (ref 3.4–5.3)
RBC # BLD AUTO: 3.99 10E12/L (ref 4.4–5.9)
SODIUM SERPL-SCNC: 143 MMOL/L (ref 133–144)
WBC # BLD AUTO: 7.2 10E9/L (ref 4–11)

## 2018-09-18 PROCEDURE — 84100 ASSAY OF PHOSPHORUS: CPT | Performed by: PHYSICIAN ASSISTANT

## 2018-09-18 PROCEDURE — 25000128 H RX IP 250 OP 636: Performed by: PHYSICIAN ASSISTANT

## 2018-09-18 PROCEDURE — 25000125 ZZHC RX 250: Performed by: RADIOLOGY

## 2018-09-18 PROCEDURE — 27210904 ZZH KIT CR6

## 2018-09-18 PROCEDURE — 25500064 ZZH RX 255 OP 636: Performed by: PHYSICIAN ASSISTANT

## 2018-09-18 PROCEDURE — 40000166 ZZH STATISTIC PP CARE STAGE 1

## 2018-09-18 PROCEDURE — 25000128 H RX IP 250 OP 636: Performed by: RADIOLOGY

## 2018-09-18 PROCEDURE — 85610 PROTHROMBIN TIME: CPT | Performed by: PHYSICIAN ASSISTANT

## 2018-09-18 PROCEDURE — 25000125 ZZHC RX 250: Performed by: PHYSICIAN ASSISTANT

## 2018-09-18 PROCEDURE — 99152 MOD SED SAME PHYS/QHP 5/>YRS: CPT

## 2018-09-18 PROCEDURE — 85027 COMPLETE CBC AUTOMATED: CPT | Performed by: PHYSICIAN ASSISTANT

## 2018-09-18 PROCEDURE — 99153 MOD SED SAME PHYS/QHP EA: CPT

## 2018-09-18 PROCEDURE — 77001 FLUOROGUIDE FOR VEIN DEVICE: CPT

## 2018-09-18 PROCEDURE — 80048 BASIC METABOLIC PNL TOTAL CA: CPT | Performed by: PHYSICIAN ASSISTANT

## 2018-09-18 PROCEDURE — 27210908 ZZH NEEDLE CR4

## 2018-09-18 PROCEDURE — C1769 GUIDE WIRE: HCPCS

## 2018-09-18 PROCEDURE — 27210732 ZZH ACCESSORY CR1

## 2018-09-18 PROCEDURE — C1751 CATH, INF, PER/CENT/MIDLINE: HCPCS

## 2018-09-18 PROCEDURE — 83735 ASSAY OF MAGNESIUM: CPT | Performed by: PHYSICIAN ASSISTANT

## 2018-09-18 PROCEDURE — 27210738 ZZH ACCESSORY CR2

## 2018-09-18 PROCEDURE — 27210886 ZZH ACCESSORY CR5

## 2018-09-18 RX ORDER — CLINDAMYCIN PHOSPHATE 900 MG/50ML
900 INJECTION, SOLUTION INTRAVENOUS
Status: COMPLETED | OUTPATIENT
Start: 2018-09-18 | End: 2018-09-18

## 2018-09-18 RX ORDER — HEPARIN SODIUM,PORCINE 10 UNIT/ML
5-10 VIAL (ML) INTRAVENOUS
Status: DISCONTINUED | OUTPATIENT
Start: 2018-09-18 | End: 2018-09-18 | Stop reason: HOSPADM

## 2018-09-18 RX ORDER — SODIUM CHLORIDE 9 MG/ML
INJECTION, SOLUTION INTRAVENOUS CONTINUOUS
Status: DISCONTINUED | OUTPATIENT
Start: 2018-09-18 | End: 2018-09-18 | Stop reason: HOSPADM

## 2018-09-18 RX ORDER — FLUMAZENIL 0.1 MG/ML
0.2 INJECTION, SOLUTION INTRAVENOUS
Status: DISCONTINUED | OUTPATIENT
Start: 2018-09-18 | End: 2018-09-18 | Stop reason: HOSPADM

## 2018-09-18 RX ORDER — FENTANYL CITRATE 50 UG/ML
25-50 INJECTION, SOLUTION INTRAMUSCULAR; INTRAVENOUS EVERY 5 MIN PRN
Status: DISCONTINUED | OUTPATIENT
Start: 2018-09-18 | End: 2018-09-18 | Stop reason: HOSPADM

## 2018-09-18 RX ORDER — HEPARIN SODIUM,PORCINE 10 UNIT/ML
5 VIAL (ML) INTRAVENOUS
Status: COMPLETED | OUTPATIENT
Start: 2018-09-18 | End: 2018-09-18

## 2018-09-18 RX ORDER — NALOXONE HYDROCHLORIDE 0.4 MG/ML
.1-.4 INJECTION, SOLUTION INTRAMUSCULAR; INTRAVENOUS; SUBCUTANEOUS
Status: DISCONTINUED | OUTPATIENT
Start: 2018-09-18 | End: 2018-09-18 | Stop reason: HOSPADM

## 2018-09-18 RX ORDER — LIDOCAINE 40 MG/G
CREAM TOPICAL
Status: DISCONTINUED | OUTPATIENT
Start: 2018-09-18 | End: 2018-09-18 | Stop reason: HOSPADM

## 2018-09-18 RX ORDER — HEPARIN SODIUM,PORCINE 10 UNIT/ML
5 VIAL (ML) INTRAVENOUS EVERY 24 HOURS
Status: DISCONTINUED | OUTPATIENT
Start: 2018-09-18 | End: 2018-09-18 | Stop reason: HOSPADM

## 2018-09-18 RX ORDER — IODIXANOL 320 MG/ML
50 INJECTION, SOLUTION INTRAVASCULAR ONCE
Status: COMPLETED | OUTPATIENT
Start: 2018-09-18 | End: 2018-09-18

## 2018-09-18 RX ADMIN — Medication 5 ML: at 11:03

## 2018-09-18 RX ADMIN — IODIXANOL 15 ML: 320 INJECTION, SOLUTION INTRAVASCULAR at 10:52

## 2018-09-18 RX ADMIN — FENTANYL CITRATE 50 MCG: 50 INJECTION, SOLUTION INTRAMUSCULAR; INTRAVENOUS at 10:54

## 2018-09-18 RX ADMIN — HEPARIN SODIUM 5000 UNITS: 1000 INJECTION, SOLUTION INTRAVENOUS; SUBCUTANEOUS at 11:02

## 2018-09-18 RX ADMIN — CLINDAMYCIN PHOSPHATE 900 MG: 900 INJECTION, SOLUTION INTRAVENOUS at 09:15

## 2018-09-18 RX ADMIN — MIDAZOLAM HYDROCHLORIDE 1 MG: 2 INJECTION, SOLUTION INTRAMUSCULAR; INTRAVENOUS at 10:54

## 2018-09-18 RX ADMIN — LIDOCAINE HYDROCHLORIDE 5 ML: 10 INJECTION, SOLUTION EPIDURAL; INFILTRATION; INTRACAUDAL; PERINEURAL at 11:03

## 2018-09-18 RX ADMIN — SODIUM CHLORIDE: 9 INJECTION, SOLUTION INTRAVENOUS at 09:15

## 2018-09-18 NOTE — PROCEDURES
Interventional Radiology Brief Post Procedure Note    Procedure: IR CVC TUNNEL REVISION RIGHT    Proceduralist: MOE August PA-C    Assistant: None    Time Out: Prior to the start of the procedure and with procedural staff participation, I verbally confirmed the patient s identity using two indicators, relevant allergies, that the procedure was appropriate and matched the consent or emergent situation, and that the correct equipment/implants were available. Immediately prior to starting the procedure I conducted the Time Out with the procedural staff and re-confirmed the patient s name, procedure, and site/side. (The Joint Commission universal protocol was followed.)  Yes    Medications   Medication Event Details Admin User Admin Time   iodixanol (VISIPAQUE 320) injection 50 mL Medication Given Dose: 15 mL; Route: Intravenous; Scheduled Time: 11:00 AM María Santacruz 9/18/2018 10:52 AM   midazolam (VERSED) injection 0.5-1 mg Medication Given Dose: 1 mg; Route: Intravenous Bartolo Borja RN 9/18/2018 10:54 AM   fentaNYL (PF) (SUBLIMAZE) injection 25-50 mcg Medication Given Dose: 50 mcg; Route: Intravenous Bartolo Borja RN 9/18/2018 10:54 AM       Sedation: IR Nurse Monitored Care   Post Procedure Summary:  Prior to the start of the procedure and with procedural staff participation, I verbally confirmed the patient s identity using two indicators, relevant allergies, that the procedure was appropriate and matched the consent or emergent situation, and that the correct equipment/implants were available. Immediately prior to starting the procedure I conducted the Time Out with the procedural staff and re-confirmed the patient s name, procedure, and site/side. (The Joint Commission universal protocol was followed.)  Yes       Sedatives: Fentanyl and Midazolam (Versed)    Vital signs, airway and pulse oximetry were monitored and remained stable throughout the procedure and sedation was maintained until the  procedure was complete.  The patient was monitored by staff until sedation discharge criteria were met.    Patient tolerance: Patient tolerated the procedure well with no immediate complications.    Time of sedation in minutes: 30 Minutes minutes from beginning to end of physician one to one monitoring.          Findings: Completed re-placement of 5 Swiss 24 cm single lumen, Power Cm brand, tunneled central venous access catheter via RIJV.  Current RIGHT chest catheter was pulled on and the subcutaneous cuff is exposed.  Patient needs chronic central venous access for daily fluids and TPN.  Attempt for new internal jugular puncture successful, but unable to pass the wire centrally. Venography performed at the internal jugular access shows narrowing around current line. Attempt to mobilize the line under the skin at the clavicle to save the venopuncture were unsuccessful. Decision was then made to complete over-the-wire exchange for new line. Tip lying in the right atrium. Okay to use immediately. Note: patient has history of multiple placed and removed central venous access points and likely will continue to have poor venous access. Today's venogram suggests RIJV narrowing and future RIJV access may prove difficult. Dx: Dehydration; Other complication due to venous access device, initial encounter. Omero.  30 1 50 3.1     Estimated Blood Loss: Minimal    Fluoroscopy Time:  minute(s)    SPECIMENS: None    Complications: 1. None     Condition: Stable    Plan:     Comments: See dictated procedure note for full details.    Guy Jamil PA-C

## 2018-09-18 NOTE — PROGRESS NOTES
Interventional Radiology Pre-Procedure Sedation Assessment   Time of Assessment: 9:03 AM    Expected Level: Moderate Sedation    Indication: Sedation is required for the following type of Procedure: Venous    Sedation and procedural consent: Risks, benefits and alternatives were discussed with Patient    PO Intake: Appropriately NPO for procedure    ASA Class: Class 2 - MILD SYSTEMIC DISEASE, NO ACUTE PROBLEMS, NO FUNCTIONAL LIMITATIONS.    Mallampati: Grade 3:  Soft palate visible, posterior pharyngeal wall not visible    Lungs: Lungs Clear with good breath sounds bilaterally    Heart: Normal heart sounds and rate    History and physical reviewed and no updates needed. I have reviewed the lab findings, diagnostic data, medications, and the plan for sedation. I have determined this patient to be an appropriate candidate for the planned sedation and procedure and have reassessed the patient IMMEDIATELY PRIOR to sedation and procedure.    Reilly Hsieh MD

## 2018-09-18 NOTE — IP AVS SNAPSHOT
"                  MRN:[id number]                      After Visit Summary   9/18/2018    Parker Acevedo Sr    MRN: [id number]           Visit Information        Department      9/18/2018  8:19 AM [address] [facility]          Review of your medicines      UNREVIEWED medicines. Ask your doctor about these medicines        Dose / Directions    acetaminophen 500 MG tablet   Commonly known as:  TYLENOL        Dose:  1000 mg   Take 1,000 mg by mouth every 6 hours as needed for fever   Refills:  0       albuterol 108 (90 Base) MCG/ACT inhaler   Commonly known as:  PROAIR HFA/PROVENTIL HFA/VENTOLIN HFA   Used for:  Productive cough        Dose:  2 puff   Inhale 2 puffs into the lungs every 4 hours as needed for shortness of breath / dyspnea or wheezing   Quantity:  1 Inhaler   Refills:  3       amphetamine-dextroamphetamine 20 MG per tablet   Commonly known as:  ADDERALL   Used for:  ADHD (attention deficit hyperactivity disorder), inattentive type        Dose:  20 mg   Take 1 tablet (20 mg) by mouth daily   Quantity:  30 tablet   Refills:  0       COREG 12.5 MG tablet   Generic drug:  carvedilol        Dose:  12.5 mg   Take 12.5 mg by mouth 2 times daily (with meals)   Refills:  0       cyanocobalamin 1000 MCG/ML injection   Commonly known as:  VITAMIN B12   Used for:  Bariatric surgery status        Dose:  1 mL   Inject 1 mL (1,000 mcg) into the muscle every 30 days   Quantity:  1 mL   Refills:  11       [facility] MANAGED PATIENT   Used for:  S/P bariatric surgery        Contact [facility] for patient specific medication information at [phone] on admission and discharge from the hospital.  Phones are answered 24 hours a day 7 days a week 365 days a year.  Providers - Choose \"CONTINUE HOME MED (no script)\" at discharge if patient treatment with home infusion will continue.   Refills:  0       fentaNYL 25 mcg/hr 72 hr patch   Commonly known as:  DURAGESIC   Used for:  Chronic " abdominal pain, Encounter for long-term opiate analgesic use        Dose:  1 patch   Place 1 patch onto the skin every 48 hours remove old patch.  Release on/after 9/6/18 to start on/after 9/8/18.   Quantity:  15 patch   Refills:  0       lidocaine 5 % Patch   Commonly known as:  LIDODERM   Used for:  Chronic bilateral low back pain without sciatica, Chronic abdominal pain, Myofascial pain        Dose:  1-2 patch   Place 1-2 patches onto the skin every 24 hours Wear for 12 hours, remove for 12 hours.  OK to cut to better fit to size.   Quantity:  60 patch   Refills:  6       lidocaine-prilocaine cream   Commonly known as:  EMLA   Used for:  Pain        Apply topically as needed for moderate pain   Quantity:  30 g   Refills:  3       ondansetron 8 MG ODT tab   Commonly known as:  ZOFRAN-ODT   Used for:  Nausea        Dose:  8 mg   Take 1 tablet (8 mg) by mouth every 8 hours as needed for nausea   Quantity:  90 tablet   Refills:  3       oxyCODONE 5 MG/5ML solution   Commonly known as:  ROXICODONE   Used for:  Encounter for long-term opiate analgesic use, Chronic abdominal pain        Dose:  10-15 mg   Take 10-15 mLs (10-15 mg) by mouth every 4 hours as needed for moderate to severe pain Max of 45 mg/day this month. Weaning dose as discussed. Fill on/after 9/6/18 not to start untill 9/8/18.   Quantity:  1350 mL   Refills:  0       parenteral nutrition - PTA/DISCHARGE ORDER        [facility] to resume previous home TPN but run x 24 hours the first day until labs are ok. Then cycle x 14 hours per previous home regimen. He has been off TPN x 5 days here.   Refills:  0       sucralfate 1 GM/10ML suspension   Commonly known as:  CARAFATE   Used for:  Nausea        Dose:  1 g   Take 10 mLs (1 g) by mouth 4 times daily   Quantity:  1200 mL   Refills:  11       vitamin D 54172 UNIT capsule   Commonly known as:  ERGOCALCIFEROL   Used for:  Vitamin D deficiency        Dose:  63809 Units   Take 1 capsule (50,000 Units) by mouth every  "7 days   Quantity:  12 capsule   Refills:  3         CONTINUE these medicines which have NOT CHANGED        Dose / Directions    Needle (Disp) 27G X 1/2\" Misc   Commonly known as:  BD DISP NEEDLES   Used for:  Bariatric surgery status        Dose:  1 Device   1 Device every 30 days Use for cyanocobalamin injection once q 30 days.   Quantity:  3 each   Refills:  4       * order for DME   Used for:  Bariatric surgery status        Injection Supplies for Vitamin B12: 3cc syringes w/ 27 gauge needles, 1/2 inch length   Quantity:  12 each   Refills:  0       * order for DME   Used for:  Bilateral edema of lower extremity        Equipment being ordered: Bilateral knee high chronic venous insufficiency stockings--  mild-moderate pressures.   Quantity:  4 each   Refills:  5       * Notice:  This list has 2 medication(s) that are the same as other medications prescribed for you. Read the directions carefully, and ask your doctor or other care provider to review them with you.             Protect others around you: Learn how to safely use, store and throw away your medicines at www.disposemymeds.org.         Follow-ups after your visit        Your next 10 appointments already scheduled     Sep 19, 2018  1:00 PM CDT   Return Visit with Patti Milligan MD   [facility] ([facility])    [address] [facility] [address] [phone]               Care Instructions        Further instructions from your care team                                                                        Interventional Radiology                                                                   Discharge Instructions                                                                FollowingTunneled Catheter Placement    Your site(s) has been closed with:     The Catheter is sutured  to skin at  insertion site    Derma Peña (Skin Glue) on neck incision    Do not apply any ointments over site    This " thin layer will slough off in 7-10 days               May gently remove Derma Peña in 10 days if still present         Tunneled catheter is always covered with a Sterile Transparent dressing    Keep site clean and dry     Cover with an occlusive dressing for showering    Weekly transparent dressing changes or when it becomes wet or dirty     If there is any oozing or bleeding from the site, apply direct pressure for 5-10 minutes with a gauze pad.  If bleeding continues after 10 minutes call your primary doctor.  If bleeding cannot be controlled with direct pressure, call 911.    Call your Doctor if:    Bleeding as above    Swelling in your neck or arm    Sudden onset of shortness of breath, lightheadedness, or heart palpitations..    Fever greater than 100.5  F    Other signs of infection such as, redness, tenderness, or drainage from the wound.    If you were given sedation:    We recommend an adult stay with you for the first 24 hours.    No driving or alcoholic beverages for 24 hours.    ADDITIONAL INSTRUCTIONS:          If you are on Coumadin you may restart tonight.  Follow up Coumadin Clinic or Primary Care MD         To have your INR rechecked.           No heavy lifting greater than 10 lbs for 3 days.           May use ice pack for pain or minor swelling for 15 min 3-4 times per day.    [facility] Interventional Radiology Department    Physician:   DENNIS Houser                                 Date:September 18, 2018  Telephone Numbers:  [phone].....8 am to 4:30 pm   Monday-Friday  Hospital : [phone]....After hours, Weekends, & Holidays.  Ask for the Interventional Radiologist on call.  Someone is on call 24 hours a day.   Emergency Department:  [phone]                                                                                                                                                               Additional Information About Your Visit        MyChart Information     [employer]  "gives you secure access to your electronic health record. If you see a primary care provider, you can also send messages to your care team and make appointments. If you have questions, please call your primary care clinic.  If you do not have a primary care provider, please call [phone] and they will assist you.        Care EveryWhere ID     This is your Care EveryWhere ID. This could be used by other organizations to access your [facility] medical records  [id number]        Your Vitals Were     Blood Pressure Pulse Temperature Respirations Height Weight    122/88 59 98.3  F (36.8  C) (Oral) 16 1.803 m (5' 11\") 77.6 kg (171 lb)    Pulse Oximetry BMI (Body Mass Index)                100% 23.85 kg/m2           Primary Care Provider Office Phone # Fax #    Esteban Daly [phone]      Equal Access to Services     [facility] : Hadii kameron fregosoo Soheike, waaxda luqadaha, qaybta kaalmada ademanoloyada, carmela dumont . So [facility] [phone].    ATENCIÓN: Si habla español, tiene a hicks disposición servicios gratuitos de asistencia lingüística. Chu al [phone].    We comply with applicable federal civil rights laws and [place] laws. We do not discriminate on the basis of race, color, national origin, age, disability, sex, sexual orientation, or gender identity.            Thank you!     Thank you for choosing [facility] for your care. Our goal is always to provide you with excellent care. Hearing back from our patients is one way we can continue to improve our services. Please take a few minutes to complete the written survey that you may receive in the mail after you visit with us. Thank you!             Medication List: This is a list of all your medications and when to take them. Check marks below indicate your daily home schedule. Keep this list as a reference.      Medications           Morning Afternoon Evening Bedtime As Needed    acetaminophen 500 MG " "tablet   Commonly known as:  TYLENOL   Take 1,000 mg by mouth every 6 hours as needed for fever                                albuterol 108 (90 Base) MCG/ACT inhaler   Commonly known as:  PROAIR HFA/PROVENTIL HFA/VENTOLIN HFA   Inhale 2 puffs into the lungs every 4 hours as needed for shortness of breath / dyspnea or wheezing                                amphetamine-dextroamphetamine 20 MG per tablet   Commonly known as:  ADDERALL   Take 1 tablet (20 mg) by mouth daily                                COREG 12.5 MG tablet   Take 12.5 mg by mouth 2 times daily (with meals)   Generic drug:  carvedilol                                cyanocobalamin 1000 MCG/ML injection   Commonly known as:  VITAMIN B12   Inject 1 mL (1,000 mcg) into the muscle every 30 days                                [facility] INFUSION MANAGED PATIENT   Contact [facility] for patient specific medication information at [phone] on admission and discharge from the hospital.  Phones are answered 24 hours a day 7 days a week 365 days a year.  Providers - Choose \"CONTINUE HOME MED (no script)\" at discharge if patient treatment with home infusion will continue.                                fentaNYL 25 mcg/hr 72 hr patch   Commonly known as:  DURAGESIC   Place 1 patch onto the skin every 48 hours remove old patch.  Release on/after 9/6/18 to start on/after 9/8/18.                                lidocaine 5 % Patch   Commonly known as:  LIDODERM   Place 1-2 patches onto the skin every 24 hours Wear for 12 hours, remove for 12 hours.  OK to cut to better fit to size.                                lidocaine-prilocaine cream   Commonly known as:  EMLA   Apply topically as needed for moderate pain                                Needle (Disp) 27G X 1/2\" Misc   Commonly known as:  BD DISP NEEDLES   1 Device every 30 days Use for cyanocobalamin injection once q 30 days.                                ondansetron 8 MG ODT tab   Commonly " known as:  ZOFRAN-ODT   Take 1 tablet (8 mg) by mouth every 8 hours as needed for nausea                                * order for DME   Injection Supplies for Vitamin B12: 3cc syringes w/ 27 gauge needles, 1/2 inch length                                * order for DME   Equipment being ordered: Bilateral knee high chronic venous insufficiency stockings--  mild-moderate pressures.                                oxyCODONE 5 MG/5ML solution   Commonly known as:  ROXICODONE   Take 10-15 mLs (10-15 mg) by mouth every 4 hours as needed for moderate to severe pain Max of 45 mg/day this month. Weaning dose as discussed. Fill on/after 9/6/18 not to start untill 9/8/18.                                parenteral nutrition - PTA/DISCHARGE ORDER   [facility] to resume previous home TPN but run x 24 hours the first day until labs are ok. Then cycle x 14 hours per previous home regimen. He has been off TPN x 5 days here.                                sucralfate 1 GM/10ML suspension   Commonly known as:  CARAFATE   Take 10 mLs (1 g) by mouth 4 times daily                                vitamin D 64898 UNIT capsule   Commonly known as:  ERGOCALCIFEROL   Take 1 capsule (50,000 Units) by mouth every 7 days                                * Notice:  This list has 2 medication(s) that are the same as other medications prescribed for you. Read the directions carefully, and ask your doctor or other care provider to review them with you.

## 2018-09-18 NOTE — PROGRESS NOTES
Prep and teaching complete for Tunneled line Revision (Right Chest); Family at bedside, wife, Rose phone: [phone] and Sister In Law, Rene. Pt has G tube for venting; peripheral in left arm. Takes only small amounts orally. Hydrates per IV . Consent and H and P are current; awaiting lab results.

## 2018-09-18 NOTE — PROGRESS NOTES
Pt returned to 2A s/p tunneled line exchange. VSS, pt denies pain. R neck/line site CDI. Continue to monitor

## 2018-09-18 NOTE — PROGRESS NOTES
Patient Name: Parker Acevedo Sr  Medical Record Number: [id number]  Today's Date: 9/18/2018    Procedure: Tunneled Line Exchange  Proceduralist: Chico Jamil    Sedation start time: 1030  Sedation end time: 1050   Sedation medications administered: 1 mg Versed, 50 mcg    Total sedation time: 20 min     Procedure start time: 1020  Puncture time: 1030  Procedure end time: 1050    Report given to: Carmencita HARRISON      Other Notes: Pt arrived to IR room 2 from . Pt denies any questions or concerns regarding procedure. Pt positioned supine and monitored per protocol. Pt tolerated procedure without any noted complications. Pt transferred back to 2A.

## 2018-09-18 NOTE — IP AVS SNAPSHOT
[address] [address]                                       After Visit Summary   9/18/2018    Parker Acevedo Sr    MRN: [id number]           After Visit Summary Signature Page     I have received my discharge instructions, and my questions have been answered. I have discussed any challenges I see with this plan with the nurse or doctor.    ..........................................................................................................................................  Patient/Patient Representative Signature      ..........................................................................................................................................  Patient Representative Print Name and Relationship to Patient    ..................................................               ................................................  Date                                   Time    ..........................................................................................................................................  Reviewed by Signature/Title    ...................................................              ..............................................  Date                                               Time          22EPIC Rev 08/18

## 2018-09-18 NOTE — DISCHARGE INSTRUCTIONS
Interventional Radiology                                                                   Discharge Instructions                                                                FollowingTunneled Catheter Placement    Your site(s) has been closed with:     The Catheter is sutured  to skin at  insertion site    Derma Peña (Skin Glue) on neck incision    Do not apply any ointments over site    This thin layer will slough off in 7-10 days               May gently remove Derma Peña in 10 days if still present         Tunneled catheter is always covered with a Sterile Transparent dressing    Keep site clean and dry     Cover with an occlusive dressing for showering    Weekly transparent dressing changes or when it becomes wet or dirty     If there is any oozing or bleeding from the site, apply direct pressure for 5-10 minutes with a gauze pad.  If bleeding continues after 10 minutes call your primary doctor.  If bleeding cannot be controlled with direct pressure, call 911.    Call your Doctor if:    Bleeding as above    Swelling in your neck or arm    Sudden onset of shortness of breath, lightheadedness, or heart palpitations..    Fever greater than 100.5  F    Other signs of infection such as, redness, tenderness, or drainage from the wound.    If you were given sedation:    We recommend an adult stay with you for the first 24 hours.    No driving or alcoholic beverages for 24 hours.    ADDITIONAL INSTRUCTIONS:          If you are on Coumadin you may restart tonight.  Follow up Coumadin Clinic or Primary Care MD         To have your INR rechecked.           No heavy lifting greater than 10 lbs for 3 days.           May use ice pack for pain or minor swelling for 15 min 3-4 times per day.    [facility] Interventional Radiology Department    Physician:   DNENIS Houser                                 Date:September 18, 2018  Telephone Numbers:  [phone].....8  am to 4:30 pm   Monday-Friday  [facility] : [phone]....After hours, Weekends, & Holidays.  Ask for the Interventional Radiologist on call.  Someone is on call 24 hours a day.   Emergency Department:  [phone]

## 2018-09-18 NOTE — PROGRESS NOTES
Pt up walking, steady on his feet; line site is dry and intact; pt denies pain. Pt takes minimal PO at baseline, taking sips of juice. Discharge instructions reviewed with pt and family; copy to pt. Pt stated understanding and denies questions. Pt has had a line for along time previous. Peripheral IV in left arm dc'd. VSS. Pt dc to home with family.

## 2018-09-19 ENCOUNTER — HOME INFUSION (PRE-WILLOW HOME INFUSION) (OUTPATIENT)
Dept: PHARMACY | Facility: CLINIC | Age: 48
End: 2018-09-19

## 2018-09-19 ENCOUNTER — MYC MEDICAL ADVICE (OUTPATIENT)
Dept: PALLIATIVE MEDICINE | Facility: CLINIC | Age: 48
End: 2018-09-19

## 2018-09-20 NOTE — PROGRESS NOTES
This is a recent snapshot of the patient's [facility] medical record.  For current drug dose and complete information and questions, call [phone] or In [facility] pool, [facility] home infusion ([id number])  CSN Number:  [id number]

## 2018-09-23 ENCOUNTER — MYC MEDICAL ADVICE (OUTPATIENT)
Dept: PALLIATIVE MEDICINE | Facility: CLINIC | Age: 48
End: 2018-09-23

## 2018-09-23 DIAGNOSIS — R10.9 CHRONIC ABDOMINAL PAIN: ICD-10-CM

## 2018-09-23 DIAGNOSIS — G89.29 CHRONIC ABDOMINAL PAIN: ICD-10-CM

## 2018-09-23 DIAGNOSIS — Z79.891 ENCOUNTER FOR LONG-TERM OPIATE ANALGESIC USE: ICD-10-CM

## 2018-09-24 ENCOUNTER — HOME INFUSION (PRE-WILLOW HOME INFUSION) (OUTPATIENT)
Dept: PHARMACY | Facility: CLINIC | Age: 48
End: 2018-09-24

## 2018-09-24 LAB
ALBUMIN SERPL-MCNC: 3.6 G/DL (ref 3.4–5)
ALP SERPL-CCNC: 85 U/L (ref 40–150)
ALT SERPL W P-5'-P-CCNC: 22 U/L (ref 0–70)
ANION GAP SERPL CALCULATED.3IONS-SCNC: 9 MMOL/L (ref 3–14)
AST SERPL W P-5'-P-CCNC: 13 U/L (ref 0–45)
BASOPHILS # BLD AUTO: 0 10E9/L (ref 0–0.2)
BASOPHILS NFR BLD AUTO: 0.6 %
BILIRUB DIRECT SERPL-MCNC: 0.1 MG/DL (ref 0–0.2)
BILIRUB SERPL-MCNC: 0.6 MG/DL (ref 0.2–1.3)
BUN SERPL-MCNC: 8 MG/DL (ref 7–30)
CALCIUM SERPL-MCNC: 8.2 MG/DL (ref 8.5–10.1)
CHLORIDE SERPL-SCNC: 108 MMOL/L (ref 94–109)
CO2 SERPL-SCNC: 27 MMOL/L (ref 20–32)
CREAT SERPL-MCNC: 0.66 MG/DL (ref 0.66–1.25)
DIFFERENTIAL METHOD BLD: ABNORMAL
EOSINOPHIL # BLD AUTO: 0.1 10E9/L (ref 0–0.7)
EOSINOPHIL NFR BLD AUTO: 3 %
ERYTHROCYTE [DISTWIDTH] IN BLOOD BY AUTOMATED COUNT: 13.3 % (ref 10–15)
GFR SERPL CREATININE-BSD FRML MDRD: >90 ML/MIN/1.7M2
GLUCOSE SERPL-MCNC: 98 MG/DL (ref 70–99)
HCT VFR BLD AUTO: 40.7 % (ref 40–53)
HGB BLD-MCNC: 12.9 G/DL (ref 13.3–17.7)
IMM GRANULOCYTES # BLD: 0 10E9/L (ref 0–0.4)
IMM GRANULOCYTES NFR BLD: 0 %
LYMPHOCYTES # BLD AUTO: 1.4 10E9/L (ref 0.8–5.3)
LYMPHOCYTES NFR BLD AUTO: 29.7 %
MAGNESIUM SERPL-MCNC: 1.9 MG/DL (ref 1.6–2.3)
MANGANESE BLD-MCNC: NORMAL NG/ML
MCH RBC QN AUTO: 30.3 PG (ref 26.5–33)
MCHC RBC AUTO-ENTMCNC: 31.7 G/DL (ref 31.5–36.5)
MCV RBC AUTO: 96 FL (ref 78–100)
MONOCYTES # BLD AUTO: 0.3 10E9/L (ref 0–1.3)
MONOCYTES NFR BLD AUTO: 6.5 %
NEUTROPHILS # BLD AUTO: 2.8 10E9/L (ref 1.6–8.3)
NEUTROPHILS NFR BLD AUTO: 60.2 %
NRBC # BLD AUTO: 0 10*3/UL
NRBC BLD AUTO-RTO: 0 /100
PHOSPHATE SERPL-MCNC: 2.5 MG/DL (ref 2.5–4.5)
PLATELET # BLD AUTO: 160 10E9/L (ref 150–450)
POTASSIUM SERPL-SCNC: 3.4 MMOL/L (ref 3.4–5.3)
PREALB SERPL IA-MCNC: 14 MG/DL (ref 15–45)
PROT SERPL-MCNC: 6.9 G/DL (ref 6.8–8.8)
RBC # BLD AUTO: 4.26 10E12/L (ref 4.4–5.9)
SODIUM SERPL-SCNC: 144 MMOL/L (ref 133–144)
TRIGL SERPL-MCNC: 54 MG/DL
WBC # BLD AUTO: 4.6 10E9/L (ref 4–11)

## 2018-09-24 PROCEDURE — 82248 BILIRUBIN DIRECT: CPT | Performed by: SURGERY

## 2018-09-24 PROCEDURE — 84478 ASSAY OF TRIGLYCERIDES: CPT | Performed by: SURGERY

## 2018-09-24 PROCEDURE — 84134 ASSAY OF PREALBUMIN: CPT | Performed by: SURGERY

## 2018-09-24 PROCEDURE — 80053 COMPREHEN METABOLIC PANEL: CPT | Performed by: SURGERY

## 2018-09-24 PROCEDURE — 83785 ASSAY OF MANGANESE: CPT | Performed by: SURGERY

## 2018-09-24 PROCEDURE — 85025 COMPLETE CBC W/AUTO DIFF WBC: CPT | Performed by: SURGERY

## 2018-09-24 PROCEDURE — 83735 ASSAY OF MAGNESIUM: CPT | Performed by: SURGERY

## 2018-09-24 PROCEDURE — 84100 ASSAY OF PHOSPHORUS: CPT | Performed by: SURGERY

## 2018-09-24 NOTE — TELEPHONE ENCOUNTER
Medication refill information reviewed.     Due date for  fentaNYL (DURAGESIC) 25 mcg/hr 72 hr patch  AND oxyCODONE (ROXICODONE) 5 MG/5ML solution is 10/8/18     Prescriptions prepped for review.     Will route to provider.

## 2018-09-24 NOTE — TELEPHONE ENCOUNTER
Received call from patient requesting refill(s) of fentaNYL (DURAGESIC) 25 mcg/hr 72 hr patch  AND oxyCODONE (ROXICODONE) 5 MG/5ML solution    Last picked up from pharmacy on 9/6/18    Pt last seen by prescribing provider on 6/27/18  Next appt scheduled for 10/29/18     checked in the past 6 months? Yes If no, print current report and give to RN    Last urine drug screen date 4/25/18  Current opioid agreement on file (completed within the last year) Yes Date of opioid agreement: 12/27/17    Mail to pharmacy      [place], [place] - [address] [place] [address]  [address] [facility] [address]  Phone: [phone] Fax: [phone]    Corky Chatman MA  [facility]    Will route to nursing pool for review and preparation of prescription(s).

## 2018-09-24 NOTE — TELEPHONE ENCOUNTER
Parker,  Do you think you would be able to come down further on medications?  Any thoughts on which?    Jessica Milligan MD  [facility]

## 2018-09-25 ENCOUNTER — HOME INFUSION (PRE-WILLOW HOME INFUSION) (OUTPATIENT)
Dept: PHARMACY | Facility: CLINIC | Age: 48
End: 2018-09-25

## 2018-09-25 NOTE — PROGRESS NOTES
This is a recent snapshot of the patient's [facility] medical record.  For current drug dose and complete information and questions, call [phone] or In [facility] pool, fv home infusion ([id number])  CSN Number:  [id number]

## 2018-09-25 NOTE — TELEPHONE ENCOUNTER
From: Parker Acevedo Sr        Created: 9/25/2018 2:22 PM       No will not be able to come down at all to much pain plus being able to get tpn in plus i came down alot if so I will need my valium back for anxiety          Separate MyChart pasted below     Plus I just came down alot of everything plus I just had a procedure done on Tuesday September 18th I'm still trying to adjust to get everything done and to get my tpn in

## 2018-09-26 ENCOUNTER — MYC REFILL (OUTPATIENT)
Dept: FAMILY MEDICINE | Facility: CLINIC | Age: 48
End: 2018-09-26

## 2018-09-26 DIAGNOSIS — Z98.84 BARIATRIC SURGERY STATUS: ICD-10-CM

## 2018-09-26 RX ORDER — FENTANYL 25 UG/1
1 PATCH TRANSDERMAL
Qty: 15 PATCH | Refills: 0 | Status: SHIPPED | OUTPATIENT
Start: 2018-09-26 | End: 2018-11-23

## 2018-09-26 RX ORDER — OXYCODONE HCL 5 MG/5 ML
10-15 SOLUTION, ORAL ORAL EVERY 4 HOURS PRN
Qty: 1350 ML | Refills: 0 | Status: SHIPPED | OUTPATIENT
Start: 2018-09-26 | End: 2018-10-29

## 2018-09-26 NOTE — TELEPHONE ENCOUNTER
Called patient. Mailed out signed Rx for fentaNYL (DURAGESIC) 25 mcg/hr 72 hr patch  AND oxyCODONE (ROXICODONE) 5 MG/5ML solution to-    [facility]   [address] [place] [id number]     Lyndsay Solo MA

## 2018-09-26 NOTE — TELEPHONE ENCOUNTER
Signed Prescriptions:                        Disp   Refills    fentaNYL (DURAGESIC) 25 mcg/hr 72 hr patch 15 pat*0        Sig: Place 1 patch onto the skin every 48 hours remove old           patch.  Release on/after 10/6/18 to start           on/after 10/8/18.  Authorizing Provider: BRANDYN JENKINS    oxyCODONE (ROXICODONE) 5 MG/5ML solution   1350 mL0        Sig: Take 10-15 mLs (10-15 mg) by mouth every 4 hours as           needed for moderate to severe pain Max of 45           mg/day this month. Weaning dose as discussed.           Fill on/after 10/6/18 not to start untill           10/8/18.  Authorizing Provider: BRANDYN JENKINS MD  [facility]

## 2018-09-27 ENCOUNTER — PATIENT OUTREACH (OUTPATIENT)
Dept: CARE COORDINATION | Facility: CLINIC | Age: 48
End: 2018-09-27

## 2018-09-27 RX ORDER — CYANOCOBALAMIN 1000 UG/ML
1 INJECTION, SOLUTION INTRAMUSCULAR; SUBCUTANEOUS
Qty: 1 ML | Refills: 11 | Status: SHIPPED | OUTPATIENT
Start: 2018-09-27 | End: 2018-10-05

## 2018-09-27 ASSESSMENT — ACTIVITIES OF DAILY LIVING (ADL): DEPENDENT_IADLS:: MEDICATION MANAGEMENT;TRANSPORTATION;SHOPPING

## 2018-09-27 NOTE — PROGRESS NOTES
This is a recent snapshot of the patient's [facility] medical record.  For current drug dose and complete information and questions, call [phone] or In [facility] pool, fv home infusion ([id number])  CSN Number:  [id number]

## 2018-09-27 NOTE — PROGRESS NOTES
Clinic Care Coordination Contact    Clinic Care Coordination Contact  OUTREACH    Referral Information:  Referral Source: IP Report    Primary Diagnosis: SIRS/Sepsis    Chief Complaint   Patient presents with     Clinic Care Coordination - Follow-up     RN        [place] Utilization:   Clinic Utilization  Difficulty keeping appointments:: No  Compliance Concerns: Yes  No-Show Concerns: No  No PCP office visit in Past Year: No  Utilization    Last refreshed: 9/27/2018  1:29 PM:  No Show Count (past year) 2       Last refreshed: 9/27/2018  1:29 PM:  ED visits 3       Last refreshed: 9/27/2018  1:29 PM:  Hospital admissions 2          Current as of: 9/27/2018  1:29 PM             Clinical Concerns:  Current Medical Concerns:  Patient has new Cm power line placed.  Spouse Rose (consent to communicate on file) reports it is working well for patient.  Rose denies any concerns at this time.  Rose states they will schedule an office visit with Dr. Daly and then schedule an appointment with a provider at [facility] to establish care.  Patient Active Problem List   Diagnosis     Peptic ulcer disease     Gastric bypass status for obesity     Chronic abdominal pain     Vomiting     Anemia     ADHD (attention deficit hyperactivity disorder), inattentive type     Vitamin B12 deficiency without anemia     Thiamine deficiency     Dehydration     Dysphagia     Weight loss, non-intentional     Malnutrition (H)     Chronic anxiety     Constipation     Bile reflux esophagitis     Former smoker     Vitamin D deficiency     Iron deficiency     Health Care Home     Chronic pain     Insomnia     Positive blood culture     Fungemia     Chronic nausea     Gastrostomy tube in place (H)     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)     Short gut syndrome     Anxiety     Status post cervical spinal arthrodesis     Port or reservoir infection, initial encounter     Abnormal echocardiogram     Low serum iron     Anemia,  iron deficiency     Catheter-related bloodstream infection (CRBSI)     Generalized weakness     S/P bariatric surgery     Hyperlipidemia LDL goal <130          Current Behavioral Concerns: Patient has ADHD and anxiety managed through his PCP.      Education Provided to patient: RN CC reviewed need to establish care once Dr. Daly retires.     Pain  Pain (GOAL):: Yes  Type: Chronic (>3mo)  Location of chronic pain:: chronic abdominal pain and lower back pain  Radiating: Yes  Progression: Constant  Limitation of routine activities due to chronic pain:: Yes  Description: Unable to perform most daily activities (chores, hobbies, social activities, driving)  Alleviating Factors: Pain Medication, Rest, Heat  Health Maintenance Reviewed: Due/Overdue   Health Maintenance Due   Topic Date Due     ADVANCE DIRECTIVE PLANNING Q5 YRS  11/06/1990     LIPID MONITORING Q1 YEAR  05/04/2017     INFLUENZA VACCINE (1) 09/01/2018      Clinical Pathway: None    Medication Management:  Patient's wife Rose assists with medication management.     Functional Status:  Dependent ADLs:: Ambulation-cane, Bathing  Dependent IADLs:: Medication Management, Transportation, Shopping  Bed or wheelchair confined:: No  Mobility Status: Independent    Living Situation:  Current living arrangement:: I live in a private home with spouse  [facility]    Diet/Exercise/Sleep:  Diet:: Regular (But also has TPN)  Inadequate nutrition (GOAL):: No  Food Insecurity: No  Tube Feeding: No  Exercise:: Yes  Days per week of moderate to strenuous exercise (like a brisk walk): 2  On average, minutes per day of exercise at this level: 10  How intense was your typical exercise? : Light (like stretching or slow walking)  Exercise Minutes per Week: 20  Inadequate activity/exercise (GOAL):: No  Significant changes in sleep pattern (GOAL): No    Transportation:  Transportation concerns (GOAL):: No  Transportation means:: Regular car      Psychosocial:  Voodoo or spiritual beliefs that impact treatment:: No  Mental health DX:: Yes  Mental health DX how managed:: Medication  Mental health management concern (GOAL):: No  Informal Support system:: Family, Spouse     Financial/Insurance:   Financial/Insurance concerns (GOAL):: No       Resources and Interventions:  Current Resources:    ;   Community Resources: Home Infusion, Home Care  Supplies used at home:: None  Equipment Currently Used at Home: cane, straight, shower chair    Advance Care Plan/Directive  Advanced Care Plans/Directives on file:: Yes  Type Advanced Care Plans/Directives: Advanced Directive - On File  Advanced Care Plan/Directive Status: Not Applicable    Referrals Placed: None     Goals:   Goals        General    Medical (pt-stated)     Notes - Note created  8/14/2018 12:26 PM by Behl, Melissa K, RN    Goal Statement: I will establish care with a new PCP prior to my current PCP's senior living.  Measure of Success: Patient will schedule an attend an appointment with a new PCP.  Supportive Steps to Achieve: RNCC reviewed [facility] locations.  Barriers: unknown  Strengths: has support from spouse  Date to Achieve By: 11/9/18  Patient expressed understanding of goal: yes                 Patient/Caregiver understanding: Spouse has fair understanding of current plan of care.    Outreach Frequency: monthly  Future Appointments              In 1 month Patti Milligan MD [facility] [place], [facility]          Plan:   1. Spouse and patient will schedule an appointment with a new provider to establish care.  2. Patient will continue to follow treatment plan as directed and follow up with PCP and specialits with concerns ongoing.    3. RN CC will follow up with patient in 1 month.    Melissa Behl BSN, RN, PHN  [facility] Care Coordinator  [phone]

## 2018-09-27 NOTE — TELEPHONE ENCOUNTER
"Message from MyChart:  Original authorizing provider: MD Parker Camara Sr would like a refill of the following medications:  Needle, Disp, (BD DISP NEEDLES) 27G X 1/2\" MISC [Esteban Daly MD]  cyanocobalamin (VITAMIN B12) 1000 MCG/ML injection [Esteban Daly MD]    Preferred pharmacy: [place], [place] - [address] [place] [address]    Comment:    "

## 2018-10-02 ENCOUNTER — MYC MEDICAL ADVICE (OUTPATIENT)
Dept: FAMILY MEDICINE | Facility: CLINIC | Age: 48
End: 2018-10-02

## 2018-10-02 ENCOUNTER — TELEPHONE (OUTPATIENT)
Dept: PALLIATIVE MEDICINE | Facility: CLINIC | Age: 48
End: 2018-10-02

## 2018-10-02 DIAGNOSIS — F41.9 ANXIETY: Primary | ICD-10-CM

## 2018-10-02 RX ORDER — FLUOXETINE 20 MG/5ML
20 SOLUTION ORAL DAILY
Qty: 120 ML | Refills: 2 | Status: SHIPPED | OUTPATIENT
Start: 2018-10-02 | End: 2018-10-03 | Stop reason: SINTOL

## 2018-10-02 NOTE — TELEPHONE ENCOUNTER
Message left at 0857 by Sharon at [facility]. She states they have rx's for oxycodone and fentanyl with the fill dates listed as 10/6. They are closed on 10/6 (Saturday) and are wondering if the fill date can be changed to Friday 10/5. Please call. Phone #[phone]       Estelle Delgado    [facility]

## 2018-10-02 NOTE — PROGRESS NOTES
"  SUBJECTIVE:   Parker Acevedo Sr is a 47 year old male who presents to clinic today for the following health issues:      HPI  Chronic Pain Follow-Up--continues care through [facility].       Type / Location of Pain: Abdominal  Analgesia/pain control:       Recent changes:  same      Overall control: Comfortably manageable  Activity level/function:      Daily activities:  Can do most things most days, with some rest    Work:  Unable to work  Adverse effects:  No  Adherence    Taking medication as directed?  Yes    Participating in other treatments: not applicable  Risk Factors:    Sleep:  Poor    Mood/anxiety:  worsened    Recent family or social stressors:  none noted    Other aggravating factors: none  PHQ-9 SCORE 1/26/2018 7/30/2018 7/30/2018   Total Score - - -   Total Score MyChart 6 (Mild depression) - 2 (Minimal depression)   Total Score 6 2 2     CIERRA-7 SCORE 5/3/2018 7/30/2018 7/30/2018   Total Score - - -   Total Score 1 (minimal anxiety) - 2 (minimal anxiety)   Total Score 1 2 2     Encounter-Level CSA - 12/07/2017:          Controlled Substance Agreement - Scan on 12/18/2017 11:45 AM : CONTROLLED SUBSTANCE AGREEMENT - 12/07/2017 (below)            Encounter-Level CSA - 12/07/2017:          Controlled Substance Agreement - Scan on 11/25/2016 12:17 PM : CONTROLLED SUBSTANCE AGREEMENT (below)            Encounter-Level CSA - 12/07/2017:          Controlled Substance Agreement - Scan on 12/28/2015 12:33 PM : CONTROLLED SUBSTANCE AGREEMENT 12/10/15 (below)            Encounter-Level CSA - 12/07/2017:          Controlled Substance Agreement - Scan on 11/24/2014  7:30 AM : Controlled Medication Agreement 10/20/14 (below)              Problem list and histories reviewed & adjusted, as indicated.  Additional history: as documented    Anxiety Follow-Up--has had a long history of intermittent anxiety \"attacks\" that he was recently taken off Valium due to the potential interaction with opioids " and respiratory status.  Has been off Valium for period of time.  A trial of beginning oral liquid Prozac 10 mg led to apparent significant esophagitis and an emergency room visit.  He was given 1 mg of Ativan to help with the increased anxiety.  He seemed to do well with good control of the anxiety and apparently had no respiratory difficulties.  He is asking for intermittent use of Ativan 1 mg daily for treatment of anxiety.  Have asked to possibly decrease the dose of Adderall to help reduce anxiety that the patient declines.  No increased depression symptoms despite the disability involving his physical challenges.    Status since last visit: No change    Other associated symptoms:None    Complicating factors:   Significant life event: Yes-ongoing chronic pain postoperative from some complications.   Current substance abuse: None  Depression symptoms: No  CIERRA-7 SCORE 5/3/2018 7/30/2018 7/30/2018   Total Score - - -   Total Score 1 (minimal anxiety) - 2 (minimal anxiety)   Total Score 1 2 2       CIERRA-7      BP Readings from Last 3 Encounters:   10/05/18 102/60   10/03/18 129/87   09/18/18 138/84    Wt Readings from Last 3 Encounters:   10/05/18 191 lb (86.6 kg)   10/03/18 176 lb (79.8 kg)   09/18/18 171 lb (77.6 kg)                    ROS:  CONSTITUTIONAL: NEGATIVE for fever, chills, change in weight  CONSTITUTIONAL: Relatively stable weight.  On TPN through deep line placement in his right chest.  INTEGUMENTARY/SKIN: NEGATIVE for worrisome rashes, moles or lesions  EYES: NEGATIVE for vision changes or irritation  ENT/MOUTH: NEGATIVE for ear, mouth and throat problems  RESP: NEGATIVE for significant cough or SOB  CV: NEGATIVE for chest pain, palpitations or peripheral edema  GI: Status post multiple surgeries following gastric bypass surgery.  Had multiple complications related to chronic pain and a total gastrectomy.  Unable to maintain nutrition with oral intake.  On chronic pain medications.  : NEGATIVE for  frequency, dysuria, or hematuria  MUSCULOSKELETAL: NEGATIVE for significant arthralgias or myalgia  NEURO: NEGATIVE for weakness, dizziness or paresthesias  ENDOCRINE: NEGATIVE for temperature intolerance, skin/hair changes  HEME: NEGATIVE for bleeding problems  PSYCHIATRIC: ADHD treatment with history of chronic anxiety with occasional exacerbations.    OBJECTIVE:                                                    /60  Pulse 76  Temp 97.1  F (36.2  C) (Temporal)  Resp 16  Wt 191 lb (86.6 kg)  BMI 26.64 kg/m2  Body mass index is 26.64 kg/(m^2).  GENERAL: alert, no distress and cooperative  EYES: Eyes grossly normal to inspection, extraocular movements - intact, and PERRL  HENT: ear canals- normal; TMs- normal; Nose- normal; Mouth- no ulcers, no lesions  NECK: no tenderness, no adenopathy, no asymmetry, no masses, no stiffness; thyroid- normal to palpation  RESP: lungs clear to auscultation - no rales, no rhonchi, no wheezes  CV: regular rates and rhythm, normal S1 S2, no S3 or S4 and no murmur, no click or rub -  ABDOMEN: Gastrostomy tube in place in the left upper quadrant for venting.  No major distention.  Slight tenderness without guarding throughout the abdomen.  Multiple surgical scars.  MS: extremities- no gross deformities noted, no edema  SKIN: no suspicious lesions, no rashes  NEURO: strength and tone- normal, sensory exam- grossly normal, mentation- intact, speech- normal, reflexes- symmetric  BACK: no CVA tenderness, no paralumbar tenderness  PSYCH: Alert and oriented times 3; speech- coherent , normal rate and volume; able to articulate logical thoughts, able to abstract reason, no tangential thoughts, no hallucinations or delusions, affect- normal  PSYCH: Mildly anxious but otherwise stable    Diagnostic test results:  Diagnostic Test Results:  none      ASSESSMENT/PLAN:                                                    1. ADHD (attention deficit hyperactivity disorder), inattentive  type  Discussed possibly reducing Adderall to 10 mg twice daily but the patient declined.  Medications renewed for 30 days.  Starting date will be 10/8/18.  - amphetamine-dextroamphetamine (ADDERALL) 20 MG per tablet; Take 1 tablet (20 mg) by mouth daily  Dispense: 30 tablet; Refill: 0    2. Anxiety  Discussed the tensional interaction with benzodiazepines with opioid therapy.  Patient does have Narcan at home if needed.  He appears to have no adverse reaction at the present time.  He does have morphine daily equivalents above 150.  Have prescribed 30 tablets of Ativan 1 mg which she may use on occasion to help control anxiety and assist in sleep at night.  To watch very carefully for respiratory depression.  - LORazepam (ATIVAN) 1 MG tablet; Take 1 tablet (1 mg) by mouth nightly as needed for anxiety or other (sleep)  Dispense: 30 tablet; Refill: 0    3. Vitamin B12 deficiency without anemia  Has missed a few monthly injections and will be updated today.  Continue monthly B12 injections.  - B12 - 1000 MCG    4. Malnutrition, unspecified type (H)  On TPN through home care.    5. Need for prophylactic vaccination and inoculation against influenza  Vaccination given today.  - FLU VACCINE, SPLIT VIRUS, IM (QUADRIVALENT) [47927]- >3 YRS  - Vaccine Administration, Initial [94435]      Follow up with Provider -The patient will be establishing with Dr. Chuy Isaac in [place] in the next few weeks.  See Patient Instructions    The patient understood the rational for the diagnosis and treatment plan. All questions were answered to best of my ability and the patient's satisfaction.    Note: Chart documentation done in part with Dragon Voice Recognition software. Although reviewed after completion, some word and grammatical errors may remain.  Please consider this when interpreting information in this chart.      Esteban Daly MD  [facility]    Injectable Influenza Immunization Documentation    1.  Is the  person to be vaccinated sick today?   No    2. Does the person to be vaccinated have an allergy to a component   of the vaccine?   No  Egg Allergy Algorithm Link    3. Has the person to be vaccinated ever had a serious reaction   to influenza vaccine in the past?   No    4. Has the person to be vaccinated ever had Guillain-Barré syndrome?   No    Form completed by Frances Trevino MA

## 2018-10-02 NOTE — TELEPHONE ENCOUNTER
Please check with the patient whether or not Prozac was started by another provider.  Is not currently on his list.    Esteban Daly MD

## 2018-10-02 NOTE — TELEPHONE ENCOUNTER
Please inform that I did prescribe the oral liquid of Prozac, to begin 20 mg daily.  Hopefully the liquid will be better absorbed given his gastrectomy.  Dosage can be increased later if needed.  Review in 3 Days with the clinic visit is planned.    Esteban Daly MD  Please close encounter when call/work completed.

## 2018-10-02 NOTE — TELEPHONE ENCOUNTER
Called pharmacy and okayed the prescription refill on Friday. Reiterated that the start date will not change.     Yuli MAYN-RN Care Coordinator  [facility]

## 2018-10-02 NOTE — TELEPHONE ENCOUNTER
RN spoke with patient. Informed he was previously on Prozac prior to Valium and would like to go back on Prozac at this time. Stated this has been discussed in the past how it may improve his sleep and help manage his OCD.   Would you like the patient to wait and address at upcoming OV?  Next 5 appointments (look out 90 days)     Oct 05, 2018 10:00 AM CDT   Office Visit with Esteban Daly MD   [facility]    [address], [address]  [facility] [address]   [phone]            Oct 12, 2018  3:00 PM CDT   Office Visit with Chuy Isaac MD   [facility] ([facility]    [address] [place] [id number]   [phone]            Oct 25, 2018  2:45 PM CDT   Office Visit with Omar Wren MD   [facility] ([facility])    [address] [place] [address]   [phone]            Oct 29, 2018 10:30 AM CDT   Return Visit with Patti Milligan MD   [facility] [place] ([facility] [place])    [address]  [place] [address] [phone]              Melva Alfredo RN, BSN

## 2018-10-03 ENCOUNTER — HOSPITAL ENCOUNTER (EMERGENCY)
Facility: CLINIC | Age: 48
Discharge: HOME | End: 2018-10-03
Attending: FAMILY MEDICINE | Admitting: FAMILY MEDICINE
Payer: COMMERCIAL

## 2018-10-03 ENCOUNTER — APPOINTMENT (OUTPATIENT)
Dept: GENERAL RADIOLOGY | Facility: CLINIC | Age: 48
End: 2018-10-03
Attending: FAMILY MEDICINE
Payer: COMMERCIAL

## 2018-10-03 VITALS
OXYGEN SATURATION: 97 % | RESPIRATION RATE: 16 BRPM | BODY MASS INDEX: 24.55 KG/M2 | DIASTOLIC BLOOD PRESSURE: 87 MMHG | HEART RATE: 69 BPM | TEMPERATURE: 99.7 F | WEIGHT: 176 LBS | SYSTOLIC BLOOD PRESSURE: 129 MMHG

## 2018-10-03 DIAGNOSIS — F41.9 ANXIETY: ICD-10-CM

## 2018-10-03 DIAGNOSIS — R07.9 CHEST PAIN, UNSPECIFIED TYPE: ICD-10-CM

## 2018-10-03 LAB
ANION GAP SERPL CALCULATED.3IONS-SCNC: 8 MMOL/L (ref 3–14)
BASOPHILS # BLD AUTO: 0 10E9/L (ref 0–0.2)
BASOPHILS NFR BLD AUTO: 0.2 %
BUN SERPL-MCNC: 7 MG/DL (ref 7–30)
CALCIUM SERPL-MCNC: 8.2 MG/DL (ref 8.5–10.1)
CHLORIDE SERPL-SCNC: 111 MMOL/L (ref 94–109)
CO2 SERPL-SCNC: 26 MMOL/L (ref 20–32)
CREAT SERPL-MCNC: 0.75 MG/DL (ref 0.66–1.25)
DIFFERENTIAL METHOD BLD: ABNORMAL
EOSINOPHIL NFR BLD AUTO: 2.2 %
ERYTHROCYTE [DISTWIDTH] IN BLOOD BY AUTOMATED COUNT: 12.9 % (ref 10–15)
GFR SERPL CREATININE-BSD FRML MDRD: >90 ML/MIN/1.7M2
GLUCOSE SERPL-MCNC: 87 MG/DL (ref 70–99)
HCT VFR BLD AUTO: 38.8 % (ref 40–53)
HGB BLD-MCNC: 13 G/DL (ref 13.3–17.7)
IMM GRANULOCYTES # BLD: 0 10E9/L (ref 0–0.4)
IMM GRANULOCYTES NFR BLD: 0.2 %
LYMPHOCYTES # BLD AUTO: 2.3 10E9/L (ref 0.8–5.3)
LYMPHOCYTES NFR BLD AUTO: 26.1 %
MCH RBC QN AUTO: 31.3 PG (ref 26.5–33)
MCHC RBC AUTO-ENTMCNC: 33.5 G/DL (ref 31.5–36.5)
MCV RBC AUTO: 94 FL (ref 78–100)
MONOCYTES # BLD AUTO: 0.9 10E9/L (ref 0–1.3)
MONOCYTES NFR BLD AUTO: 9.9 %
NEUTROPHILS # BLD AUTO: 5.5 10E9/L (ref 1.6–8.3)
NEUTROPHILS NFR BLD AUTO: 61.4 %
NRBC # BLD AUTO: 0 10*3/UL
NRBC BLD AUTO-RTO: 0 /100
PLATELET # BLD AUTO: 168 10E9/L (ref 150–450)
POTASSIUM SERPL-SCNC: 3.6 MMOL/L (ref 3.4–5.3)
RBC # BLD AUTO: 4.15 10E12/L (ref 4.4–5.9)
SODIUM SERPL-SCNC: 145 MMOL/L (ref 133–144)
TROPONIN I SERPL-MCNC: <0.015 UG/L (ref 0–0.04)
WBC # BLD AUTO: 9 10E9/L (ref 4–11)

## 2018-10-03 PROCEDURE — 99285 EMERGENCY DEPT VISIT HI MDM: CPT | Mod: Z6 | Performed by: FAMILY MEDICINE

## 2018-10-03 PROCEDURE — 99285 EMERGENCY DEPT VISIT HI MDM: CPT | Mod: 25

## 2018-10-03 PROCEDURE — 96375 TX/PRO/DX INJ NEW DRUG ADDON: CPT

## 2018-10-03 PROCEDURE — 80048 BASIC METABOLIC PNL TOTAL CA: CPT | Performed by: FAMILY MEDICINE

## 2018-10-03 PROCEDURE — 85025 COMPLETE CBC W/AUTO DIFF WBC: CPT | Performed by: FAMILY MEDICINE

## 2018-10-03 PROCEDURE — 84484 ASSAY OF TROPONIN QUANT: CPT | Performed by: FAMILY MEDICINE

## 2018-10-03 PROCEDURE — 96374 THER/PROPH/DIAG INJ IV PUSH: CPT

## 2018-10-03 PROCEDURE — 25000128 H RX IP 250 OP 636: Performed by: FAMILY MEDICINE

## 2018-10-03 RX ORDER — LIDOCAINE 40 MG/G
CREAM TOPICAL
Status: DISCONTINUED | OUTPATIENT
Start: 2018-10-03 | End: 2018-10-03 | Stop reason: HOSPADM

## 2018-10-03 RX ORDER — LORAZEPAM 2 MG/ML
1 INJECTION INTRAMUSCULAR ONCE
Status: COMPLETED | OUTPATIENT
Start: 2018-10-03 | End: 2018-10-03

## 2018-10-03 RX ORDER — HEPARIN SODIUM,PORCINE 10 UNIT/ML
5-10 VIAL (ML) INTRAVENOUS EVERY 24 HOURS
Status: DISCONTINUED | OUTPATIENT
Start: 2018-10-03 | End: 2018-10-03 | Stop reason: HOSPADM

## 2018-10-03 RX ORDER — ONDANSETRON 2 MG/ML
4 INJECTION INTRAMUSCULAR; INTRAVENOUS
Status: COMPLETED | OUTPATIENT
Start: 2018-10-03 | End: 2018-10-03

## 2018-10-03 RX ORDER — HEPARIN SODIUM,PORCINE 10 UNIT/ML
5-10 VIAL (ML) INTRAVENOUS
Status: DISCONTINUED | OUTPATIENT
Start: 2018-10-03 | End: 2018-10-03 | Stop reason: HOSPADM

## 2018-10-03 RX ADMIN — LORAZEPAM 1 MG: 2 INJECTION INTRAMUSCULAR; INTRAVENOUS at 19:37

## 2018-10-03 RX ADMIN — HEPARIN, PORCINE (PF) 10 UNIT/ML INTRAVENOUS SYRINGE 5 ML: at 20:27

## 2018-10-03 RX ADMIN — ONDANSETRON 4 MG: 2 INJECTION INTRAMUSCULAR; INTRAVENOUS at 19:34

## 2018-10-03 NOTE — ED TRIAGE NOTES
PT w/hx of chronic pain and on several meds for that was recently started on Prozac yesterday within an hr of taking it for the first time.  He has cont with those sx since.  The chest pain discomfort has stayed the same since it started yesterday.  He notes SOB and his usual nausea.  Never has had pain like this b/4.

## 2018-10-03 NOTE — ED AVS SNAPSHOT
[facility]    [address]     [place] [address]    Phone:  [phone]    Fax:  [phone]                                       Parker Acevedo Sr   MRN: [id number]    Department:  [facility]   Date of Visit:  10/3/2018           Patient Information     Date Of Birth          1970        Your diagnoses for this visit were:     Chest pain, unspecified type     Anxiety        You were seen by Collin Chahal MD.      Follow-up Information     Follow up with Esteban Daly MD.    Specialty:  Family Practice    Contact information:    [address] [address]  [phone]          Discharge Instructions       Stop the Prozac.  Follow-up in clinic on Friday as scheduled.    Your EKG and troponin were both normal which is reassuring that this is not due to your heart.  Continue the rest of your medications.  It was nice visiting with both of you tonight.  I hope this settles down quickly for you.    Thank you for choosing [facility]. We appreciate the opportunity to meet your urgent medical needs. Please let us know if we could have done anything to make your stay more satisfying.    After discharge, please closely monitor for any new or worsening symptoms. Return to the Emergency Department if you develop any acute worsening signs or symptoms.    If you had lab work, cultures or imaging studies done during your stay, the final results may still be pending. We will call you if your plan of care needs to change. However, if you are not improving as expected, please follow up with your primary care provider or clinic.     Start any prescription medications that were prescribed to you and take them as directed.     Please see additional handouts that may be pertinent to your condition.        Your next 10 appointments already scheduled     Oct 05, 2018 10:00 AM CDT   Office Visit with Esteban Miller  MD Addy   [facility] [place] ([facility]    [address]  [place] [id number] [phone]           Bring a current list of meds and any records pertaining to this visit. For Physicals, please bring immunization records and any forms needing to be filled out. Please arrive 10 minutes early to complete paperwork.            Oct 12, 2018  3:00 PM CDT   [facility]uy Isaac MD   [facility]    [address] [place] [id number] [phone]           Bring a current list of meds and any records pertaining to this visit. For Physicals, please bring immunization records and any forms needing to be filled out. Please arrive 10 minutes early to complete paperwork.            Oct 25, 2018  2:45 PM CDT   Office Visit with Omar Wren MD   [facility]    [address] [place] [id number] [phone]           Bring a current list of meds and any records pertaining to this visit. For Physicals, please bring immunization records and any forms needing to be filled out. Please arrive 10 minutes early to complete paperwork.            Oct 29, 2018 10:30 AM CDT   Return Visit with Patti Milligan MD   [facility] ([facility])    [address] [id number] [phone]              24 Hour Appointment Hotline       To make an appointment at any [facility], call [phone] ([phone]). If you don't have a family doctor or clinic, we will help you find one. [facility] are conveniently located to serve the needs of you and your family.             Review of your medicines      CONTINUE these medicines which may have CHANGED, or have new prescriptions. If we are uncertain of the size of tablets/capsules you have at home, strength may be listed as something that might have  "changed.        Dose / Directions Last dose taken    sucralfate 1 GM/10ML suspension   Commonly known as:  CARAFATE   Dose:  1 g   What changed:    - when to take this  - reasons to take this   Quantity:  1200 mL        Take 10 mLs (1 g) by mouth 4 times daily   Refills:  11        vitamin D 06492 UNIT capsule   Commonly known as:  ERGOCALCIFEROL   Dose:  23519 Units   What changed:  additional instructions   Quantity:  12 capsule        Take 1 capsule (50,000 Units) by mouth every 7 days   Refills:  3          Our records show that you are taking the medicines listed below. If these are incorrect, please call your family doctor or clinic.        Dose / Directions Last dose taken    acetaminophen 500 MG tablet   Commonly known as:  TYLENOL   Dose:  1000 mg        Take 1,000 mg by mouth every 6 hours as needed for fever   Refills:  0        albuterol 108 (90 Base) MCG/ACT inhaler   Commonly known as:  PROAIR HFA/PROVENTIL HFA/VENTOLIN HFA   Dose:  2 puff   Quantity:  1 Inhaler        Inhale 2 puffs into the lungs every 4 hours as needed for shortness of breath / dyspnea or wheezing   Refills:  3        amphetamine-dextroamphetamine 20 MG per tablet   Commonly known as:  ADDERALL   Dose:  20 mg   Quantity:  30 tablet        Take 1 tablet (20 mg) by mouth daily   Refills:  0        COREG 12.5 MG tablet   Dose:  12.5 mg   Generic drug:  carvedilol        Take 12.5 mg by mouth 2 times daily (with meals)   Refills:  0        cyanocobalamin 1000 MCG/ML injection   Commonly known as:  VITAMIN B12   Dose:  1 mL   Quantity:  1 mL        Inject 1 mL (1,000 mcg) into the muscle every 30 days   Refills:  11        [facility]ED PATIENT        Contact [facility] for patient specific medication information at [phone] on admission and discharge from the hospital.  Phones are answered 24 hours a day 7 days a week 365 days a year.  Providers - Choose \"CONTINUE HOME MED (no script)\" at discharge if " "patient treatment with home infusion will continue.   Refills:  0        fentaNYL 25 mcg/hr 72 hr patch   Commonly known as:  DURAGESIC   Dose:  1 patch   Quantity:  15 patch        Place 1 patch onto the skin every 48 hours remove old patch.  Release on/after 10/6/18 to start on/after 10/8/18.   Refills:  0        lidocaine 5 % Patch   Commonly known as:  LIDODERM   Dose:  1-2 patch   Quantity:  60 patch        Place 1-2 patches onto the skin every 24 hours Wear for 12 hours, remove for 12 hours.  OK to cut to better fit to size.   Refills:  6        lidocaine-prilocaine cream   Commonly known as:  EMLA   Quantity:  30 g        Apply topically as needed for moderate pain   Refills:  3        Needle (Disp) 27G X 1/2\" Misc   Commonly known as:  BD DISP NEEDLES   Dose:  1 Device   Quantity:  3 each        1 Device every 30 days Use for cyanocobalamin injection once q 30 days.   Refills:  4        ondansetron 8 MG ODT tab   Commonly known as:  ZOFRAN-ODT   Dose:  8 mg   Quantity:  90 tablet        Take 1 tablet (8 mg) by mouth every 8 hours as needed for nausea   Refills:  3        * order for DME   Quantity:  12 each        Injection Supplies for Vitamin B12: 3cc syringes w/ 27 gauge needles, 1/2 inch length   Refills:  0        * order for DME   Quantity:  4 each        Equipment being ordered: Bilateral knee high chronic venous insufficiency stockings--  mild-moderate pressures.   Refills:  5        oxyCODONE 5 MG/5ML solution   Commonly known as:  ROXICODONE   Dose:  10-15 mg   Quantity:  1350 mL        Take 10-15 mLs (10-15 mg) by mouth every 4 hours as needed for moderate to severe pain Max of 45 mg/day this month. Weaning dose as discussed. Fill on/after 10/6/18 not to start untill 10/8/18.   Refills:  0        parenteral nutrition - PTA/DISCHARGE ORDER        [facility] to resume previous home TPN but run x 24 hours the first day until labs are ok. Then cycle x 14 hours per previous home regimen. He has been off TPN " x 5 days here.   Refills:  0        * Notice:  This list has 2 medication(s) that are the same as other medications prescribed for you. Read the directions carefully, and ask your doctor or other care provider to review them with you.      STOP taking        Dose Reason for stopping Comments    FLUoxetine 20 MG/5ML solution   Commonly known as:  PROzac                      Procedures and tests performed during your visit     Basic metabolic panel    CBC with platelets differential    EKG 12-lead, tracing only    Troponin I      Orders Needing Specimen Collection     None      Pending Results     No orders found from 10/1/2018 to 10/4/2018.            Pending Culture Results     No orders found from 10/1/2018 to 10/4/2018.            Pending Results Instructions     If you had any lab results that were not finalized at the time of your Discharge, you can call the ED Lab Result RN at [phone]. You will be contacted by this team for any positive Lab results or changes in treatment. The nurses are available 7 days a week from 10A to 6:30P.  You can leave a message 24 hours per day and they will return your call.        Thank you for choosing [facility]       Thank you for choosing [facility] for your care. Our goal is always to provide you with excellent care. Hearing back from our patients is one way we can continue to improve our services. Please take a few minutes to complete the written survey that you may receive in the mail after you visit with us. Thank you!        [employer] Information     [employer] gives you secure access to your electronic health record. If you see a primary care provider, you can also send messages to your care team and make appointments. If you have questions, please call your primary care clinic.  If you do not have a primary care provider, please call [phone] and they will assist you.        Care EveryWhere ID     This is your Care EveryWhere ID. This could be used by other organizations  to access your [facility] medical records  [id number]        Equal Access to Services     KATHRYN GUZMAN : Negra Nathan, haydee cr, carmela pearl. So [facility] [phone].    ATENCIÓN: Si habla español, tiene a hicks disposición servicios gratuitos de asistencia lingüística. Llame al [phone].    We comply with applicable federal civil rights laws and [place] laws. We do not discriminate on the basis of race, color, national origin, age, disability, sex, sexual orientation, or gender identity.            After Visit Summary       This is your record. Keep this with you and show to your community pharmacist(s) and doctor(s) at your next visit.

## 2018-10-03 NOTE — ED AVS SNAPSHOT
[facility]    [address] [place] [address]    Phone:  [phone]    Fax:  [phone]                                       Parker Acevedo Sr   MRN: [id number]    Department:  [facility]   Date of Visit:  10/3/2018           After Visit Summary Signature Page     I have received my discharge instructions, and my questions have been answered. I have discussed any challenges I see with this plan with the nurse or doctor.    ..........................................................................................................................................  Patient/Patient Representative Signature      ..........................................................................................................................................  Patient Representative Print Name and Relationship to Patient    ..................................................               ................................................  Date                                   Time    ..........................................................................................................................................  Reviewed by Signature/Title    ...................................................              ..............................................  Date                                               Time          22EPIC Rev 08/18

## 2018-10-04 ENCOUNTER — PATIENT OUTREACH (OUTPATIENT)
Dept: CARE COORDINATION | Facility: CLINIC | Age: 48
End: 2018-10-04

## 2018-10-04 NOTE — ED PROVIDER NOTES
History     Chief Complaint   Patient presents with     Chest Pain     HPI  Parker Acevedo Sr is a 47 year old male who since to the ED with a 1 day history of chest discomfort that started about 90 minutes after taking his first dose of liquid Prozac yesterday at about 5 PM.  He was put on this for anxiety.  Since then he has had chest tightness, shortness of breath feels like his throat is swelling headache sense of a rapid heartbeat and overall just not feeling well.  He thinks that it is due to his Prozac and is probably right.  He has used Valium in the past for anxiety.  Has a follow-up appointment on Friday in clinic 2 days from now.        Problem List:    Patient Active Problem List    Diagnosis Date Noted     Hyperlipidemia LDL goal <130 08/07/2018     Priority: Medium     S/P bariatric surgery 07/30/2018     Priority: Medium     Generalized weakness 01/30/2018     Priority: Medium     Catheter-related bloodstream infection (CRBSI) 09/23/2017     Priority: Medium     Low serum iron 09/08/2017     Priority: Medium     Anemia, iron deficiency 09/08/2017     Priority: Medium     Abnormal echocardiogram 04/11/2017     Priority: Medium     Port or reservoir infection, initial encounter 03/16/2017     Priority: Medium     Status post cervical spinal arthrodesis 02/15/2017     Priority: Medium     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)      Priority: Medium     mild EF ~45% on rest 2/13/17, improves with stressing       Short gut syndrome      Priority: Medium     Anxiety      Priority: Medium     Gastrostomy tube in place (H) 08/09/2016     Priority: Medium     Chronic nausea 05/10/2016     Priority: Medium     Fungemia 04/11/2016     Priority: Medium     Positive blood culture 03/29/2016     Priority: Medium     Insomnia 08/13/2015     Priority: Medium     Chronic pain 07/07/2015     Priority: Medium     Patient is followed by Dr. Milligan for ongoing prescription of pain medication.  All refills  should be approved by this provider, or covering partner.    Medication(s): Fentanyl 50 mcg patches every three days/ Liquid oxycodone 5 mg/5ml up to 50 mg daily.   Maximum quantity per month: as per Dr. Milligan through [facility]  Clinic visit frequency required: Q 3 months at Pain Management    Controlled substance agreement on file: Yes       Date(s): Through Pain Management    Pain Clinic evaluation in the past: Yes       Date(s):  Ongoing every three months       Location(s):  Per pain management    DIRE Total Score(s):  No flowsheet data found.    Last [facility] website verification:  Through [facility]   [email or web address]    Esteban Daly MD             [facility] Home 02/24/2015     Priority: Medium     Status:  Accepted  Care Coordinator:  Melissa Behl BSN, RN, N  [facility] Care Coordinator  [phone]     See Letters for [facility] Care Plan  Date:  April 4, 2016            Iron deficiency 05/23/2014     Priority: Medium     Vitamin D deficiency 05/22/2014     Priority: Medium     Former smoker 02/24/2014     Priority: Medium     Bile reflux esophagitis 10/16/2013     Priority: Medium     Constipation 10/01/2013     Priority: Medium     Chronic anxiety 09/25/2013     Priority: Medium     Dysphagia 09/17/2013     Priority: Medium     Weight loss, non-intentional 09/17/2013     Priority: Medium     Malnutrition (H) 09/17/2013     Priority: Medium     Dehydration 08/28/2013     Priority: Medium     Vitamin B12 deficiency without anemia 07/31/2013     Priority: Medium     Diagnosis updated by automated process. Provider to review and confirm.       Thiamine deficiency 07/31/2013     Priority: Medium     ADHD (attention deficit hyperactivity disorder), inattentive type 07/02/2013     Priority: Medium     Anemia 09/20/2012     Priority: Medium     Vomiting 06/28/2012     Priority: Medium     Chronic abdominal pain 06/01/2012     Priority: Medium     Patient is followed by [facility]  [facility] for ongoing prescription of narcotic pain medicine.  Med: liquid oxycodone up to 60 mg per day.   Maximum use per month:   Expected duration: ongoing, hope per surgery that will resolve with upcoming surgery  Narcotic agreement on file: YES  Clinic visit recommended: Q 3 months         Peptic ulcer disease 04/08/2010     Priority: Medium     Gastric bypass status for obesity 04/08/2010     Priority: Medium     Top weight of 492.          Past Medical History:    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)      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)      Chronic abdominal pain      Complication of anesthesia      Difficulty swallowing      Gastric ulcer, unspecified as acute or chronic, without mention of hemorrhage, perforation, or obstruction      Gastro-oesophageal reflux disease      Generalized weakness 1/30/2018     Head injury      Hiatal hernia      Other bladder disorder      Other chronic pain      PONV (postoperative nausea and vomiting)      Severe malnutrition (H)      Short gut syndrome      Tobacco abuse        Past Surgical History:    Past Surgical History:   Procedure Laterality Date     AMPUTATION       APPENDECTOMY       BACK SURGERY  11/3/2014    curve in the spine     BIOPSY LYMPH NODE CERVICAL N/A 2/20/2015    Procedure: BIOPSY LYMPH NODE CERVICAL;  Surgeon: Baron Scanlon MD;  Location: [facility] OR     C GASTRIC BYPASS,OBESE<100CM SHAYLEE-EN-Y  2002    lost 300 pounds     CHOLECYSTECTOMY       DISCECTOMY, FUSION CERVICAL ANTERIOR ONE LEVEL, COMBINED N/A 2/15/2017    Procedure: COMBINED DISCECTOMY, FUSION CERVICAL ANTERIOR ONE LEVEL;  Surgeon: Darren Campos MD;  Location: [facility] OR     ENDOSCOPIC INSERTION TUBE GASTROSTOMY  9/9/2013    Procedure: ENDOSCOPIC INSERTION TUBE GASTROSTOMY;;  Surgeon: Francis Vyas MD;  [facility]     ENDOSCOPIC ULTRASOUND UPPER  GASTROINTESTINAL TRACT (GI)  4/29/2011    Procedure:ENDOSCOPIC ULTRASOUND UPPER GASTROINTESTINAL TRACT (GI); Both Procedures done Conjointly; Surgeon:NEREIDA HOUSER; [facility]     ENDOSCOPIC ULTRASOUND UPPER GASTROINTESTINAL TRACT (GI)  9/9/2013    Procedure: ENDOSCOPIC ULTRASOUND UPPER GASTROINTESTINAL TRACT (GI);  Endoscopic Ultrasound Guide Gastrostomy Tube Placement  C-arm;  Surgeon: Noe Lizarraga MD;  [facility]     ENDOSCOPY  03/25/11    EGD, [facility]     ENDOSCOPY  08/04/09    Upper Endoscopy, [facility]     ENDOSCOPY  01/05/09    Upper Endoscopy, [facility]     ESOPHAGOSCOPY, GASTROSCOPY, DUODENOSCOPY (EGD), COMBINED  4/20/2011    Procedure:COMBINED ESOPHAGOSCOPY, GASTROSCOPY, DUODENOSCOPY (EGD); Surgeon:FRANCIS VYAS; [facility]     ESOPHAGOSCOPY, GASTROSCOPY, DUODENOSCOPY (EGD), COMBINED  6/15/2011    Procedure:COMBINED ESOPHAGOSCOPY, GASTROSCOPY, DUODENOSCOPY (EGD); Surgeon:FRANCIS VYAS; [facility]     ESOPHAGOSCOPY, GASTROSCOPY, DUODENOSCOPY (EGD), COMBINED  6/12/2013    Procedure: COMBINED ESOPHAGOSCOPY, GASTROSCOPY, DUODENOSCOPY (EGD);;  Surgeon: Francis Vyas MD;  [facility]     ESOPHAGOSCOPY, GASTROSCOPY, DUODENOSCOPY (EGD), COMBINED  11/22/2013    Procedure: COMBINED ESOPHAGOSCOPY, GASTROSCOPY, DUODENOSCOPY (EGD);;  Surgeon: Francis Vyas MD;  [facility]     ESOPHAGOSCOPY, GASTROSCOPY, DUODENOSCOPY (EGD), COMBINED  4/30/2014    Procedure: COMBINED ESOPHAGOSCOPY, GASTROSCOPY, DUODENOSCOPY (EGD);  Surgeon: Francis Vyas MD;  [facility]     ESOPHAGOSCOPY, GASTROSCOPY, DUODENOSCOPY (EGD), COMBINED N/A 2/20/2015    Procedure: COMBINED ESOPHAGOSCOPY, GASTROSCOPY, DUODENOSCOPY (EGD), BIOPSY SINGLE OR MULTIPLE;  Surgeon: Baron Scanlon MD;  Location:  OR     ESOPHAGOSCOPY, GASTROSCOPY, DUODENOSCOPY (EGD), COMBINED N/A 9/30/2015    Procedure: COMBINED ESOPHAGOSCOPY, GASTROSCOPY, DUODENOSCOPY  (EGD);  Surgeon: Francis Vyas MD;  Location:  GI     GASTRECTOMY  6/22/2012    Procedure: GASTRECTOMY;  Open Approach, Excise Ulcers,Partial Gastrectomy, Esophagojejunostomy, Hiatal Hernia Repair, Extensive Lysis of Adhesions and Esaphagogastrodudenoscopy.;  Surgeon: Francis Vyas MD;  [facility]     GASTROJEJUNOSTOMY  08/26/09    Extensice enterolysis, partial resect. jejunum, part. resect gastric pouch, gastrojejunostomy anastomosis     HC ESOPH/GAS REFLUX TEST W NASAL IMPED ELECTRODE  8/5/2013    Procedure: ESOPHAGEAL IMPEDENCE FUNCTION TEST 1 HOUR OR LESS;  Surgeon: Halie Lang MD;  Location:  GI     HEAD & NECK SURGERY  2/15/2017    C5-C6     HERNIA REPAIR  2006    Umbilical hernia     HERNIORRHAPHY HIATAL  6/22/2012    Procedure: HERNIORRHAPHY HIATAL;;  Surgeon: Francis Vyas MD;  [facility]     HERNIORRHAPHY INGUINAL  11/22/2013    Procedure: HERNIORRHAPHY INGUINAL;;  Surgeon: Francis Vyas MD;  [facility]     INSERT PORT VASCULAR ACCESS Right 12/19/2017    Procedure: INSERT PORT VASCULAR ACCESS;  Right Chest Port Placement ;  Surgeon: Lisandro Alejandro PA-C;  Location:  OR     INSERT PORT VASCULAR ACCESS Right 8/2/2018    Procedure: INSERT PORT VASCULAR ACCESS;  Place single lumen tunneled central venous access catheter;  Surgeon: Guy Jamil PA-C;  [facility]     LAPAROTOMY EXPLORATORY  11/22/2013    Procedure: LAPAROTOMY EXPLORATORY;  Exploratory Laparotomy, Upper Endoscopy, Left Inguinal Hernia Repair;  Surgeon: Francis Vyas MD;  Location:  OR     ORTHOPEDIC SURGERY       PICC INSERTION Right 03/16/2017    5fr DL BioFlo PICC, 42cm (3cm external) in the R medial brachial vein w/ tip in the SVC RA junction.     PICC INSERTION Left 09/23/2017    5fr DL BioFlo PICC, 45cm (1cm external) in the L basilic vein w/ tip in the SVC RA junction.     SHAYLEE EN Y BOWEL  2003     SOFT TISSUE SURGERY       SOFT TISSUE SURGERY   "     THORACIC SURGERY       TONSILLECTOMY         Family History:    Family History   Problem Relation Age of Onset     GASTROINTESTINAL DISEASE Mother      Crohns disease     Anxiety Disorder Mother      Thyroid Disease Mother      Grave's disease     Cancer Father      ear cancer-skin cancer/melanoma     Breast Cancer Maternal Grandmother      Anxiety Disorder Sister      Diabetes Maternal Uncle      Breast Cancer Other      Hypertension No family hx of      Hyperlipidemia No family hx of      Cerebrovascular Disease No family hx of      Prostate Cancer No family hx of      Depression No family hx of      Anesthesia Reaction No family hx of      Asthma No family hx of      Osteoporosis No family hx of      Genetic Disorder No family hx of      Obesity No family hx of      Mental Illness No family hx of      Substance Abuse No family hx of        Social History:  Marital Status:   [2]  Social History   Substance Use Topics     Smoking status: Current Some Day Smoker     Packs/day: 0.25     Years: 3.00     Types: Cigarettes     Last attempt to quit: 7/28/2018     Smokeless tobacco: Never Used      Comment: I use an e cig every now and than     Alcohol use No      Comment: quit 2002        Medications:      amphetamine-dextroamphetamine (ADDERALL) 20 MG per tablet   carvedilol (COREG) 12.5 MG tablet   fentaNYL (DURAGESIC) 25 mcg/hr 72 hr patch   oxyCODONE (ROXICODONE) 5 MG/5ML solution   acetaminophen (TYLENOL) 500 MG tablet   albuterol (PROAIR HFA/PROVENTIL HFA/VENTOLIN HFA) 108 (90 Base) MCG/ACT Inhaler   cyanocobalamin (VITAMIN B12) 1000 MCG/ML injection   [facility] INFUSION MANAGED PATIENT   lidocaine (LIDODERM) 5 % Patch   lidocaine-prilocaine (EMLA) cream   Needle, Disp, (BD DISP NEEDLES) 27G X 1/2\" MISC   ondansetron (ZOFRAN-ODT) 8 MG ODT tab   order for DME   order for DME   parenteral nutrition - PTA/DISCHARGE ORDER   sucralfate (CARAFATE) 1 GM/10ML suspension   vitamin D (ERGOCALCIFEROL) 56376 " UNIT capsule   [DISCONTINUED] NO ACTIVE MEDICATIONS         Review of Systems   All other systems reviewed and are negative.      Physical Exam   BP: (!) 160/107  Pulse: 69  Temp: 99.7  F (37.6  C)  Resp: 18  Weight: 79.8 kg (176 lb)  SpO2: 99 %      Physical Exam   Constitutional: He is oriented to person, place, and time. He appears well-developed and well-nourished. No distress.   HENT:   Mouth/Throat: Oropharynx is clear and moist.   Cardiovascular: Normal rate and regular rhythm.    Pulmonary/Chest: Effort normal and breath sounds normal. No respiratory distress.   Musculoskeletal: Normal range of motion. He exhibits no edema.   Neurological: He is alert and oriented to person, place, and time. No cranial nerve deficit. GCS eye subscore is 4. GCS verbal subscore is 5. GCS motor subscore is 6.   Skin: Skin is warm and dry.   Psychiatric: His mood appears anxious (mild).       ED Course  (with Medical Decision Making)    To 7-year old gentleman with chest tightness, shortness of breath, sense of palpitations and a throat swelling for the past 24 hours after taking his very first dose of liquid Prozac last night for anxiety.      EKG showed no acute ischemic changes and was normal.  Troponin was undetectable.  He realized some relief with the Ativan and was wondering if he could have some for home.  I told him just to stop the Prozac and he has a follow-up appointment on Friday, 2 days from now.  Would consider something like Paxil or Zoloft rather than Prozac.           ED Course     Procedures               EKG Interpretation:      Interpreted by Collin Chahal  Time reviewed: 1918  Symptoms at time of EKG: chest pain   Rhythm: Sinus bradycardia  Rate: 54 bpm  Axis: normal  Ectopy: none  Conduction: normal  ST Segments/ T Waves: No ST-T wave changes  Q Waves: none  Comparison to prior: Unchanged from 4/25/2018    Clinical Impression: normal EKG          Critical Care time:  none               Results for  orders placed or performed during the hospital encounter of 10/03/18 (from the past 24 hour(s))   CBC with platelets differential   Result Value Ref Range    WBC 9.0 4.0 - 11.0 10e9/L    RBC Count 4.15 (L) 4.4 - 5.9 10e12/L    Hemoglobin 13.0 (L) 13.3 - 17.7 g/dL    Hematocrit 38.8 (L) 40.0 - 53.0 %    MCV 94 78 - 100 fl    MCH 31.3 26.5 - 33.0 pg    MCHC 33.5 31.5 - 36.5 g/dL    RDW 12.9 10.0 - 15.0 %    Platelet Count 168 150 - 450 10e9/L    Diff Method Automated Method     % Neutrophils 61.4 %    % Lymphocytes 26.1 %    % Monocytes 9.9 %    % Eosinophils 2.2 %    % Basophils 0.2 %    % Immature Granulocytes 0.2 %    Nucleated RBCs 0 0 /100    Absolute Neutrophil 5.5 1.6 - 8.3 10e9/L    Absolute Lymphocytes 2.3 0.8 - 5.3 10e9/L    Absolute Monocytes 0.9 0.0 - 1.3 10e9/L    Absolute Basophils 0.0 0.0 - 0.2 10e9/L    Abs Immature Granulocytes 0.0 0 - 0.4 10e9/L    Absolute Nucleated RBC 0.0    Basic metabolic panel   Result Value Ref Range    Sodium 145 (H) 133 - 144 mmol/L    Potassium 3.6 3.4 - 5.3 mmol/L    Chloride 111 (H) 94 - 109 mmol/L    Carbon Dioxide 26 20 - 32 mmol/L    Anion Gap 8 3 - 14 mmol/L    Glucose 87 70 - 99 mg/dL    Urea Nitrogen 7 7 - 30 mg/dL    Creatinine 0.75 0.66 - 1.25 mg/dL    GFR Estimate >90 >60 mL/min/1.7m2    GFR Estimate If Black >90 >60 mL/min/1.7m2    Calcium 8.2 (L) 8.5 - 10.1 mg/dL   Troponin I   Result Value Ref Range    Troponin I ES <0.015 0.000 - 0.045 ug/L     *Note: Due to a large number of results and/or encounters for the requested time period, some results have not been displayed. A complete set of results can be found in Results Review.       Medications   lidocaine 1 % 1 mL (not administered)   lidocaine (LMX4) kit (not administered)   sodium chloride (PF) 0.9% PF flush 10-20 mL (10 mLs Intracatheter Given 10/3/18 1939)   heparin lock flush 10 UNIT/ML injection 5-10 mL (not administered)   heparin lock flush 10 UNIT/ML injection 5-10 mL (not administered)   LORazepam  (ATIVAN) injection 1 mg (1 mg Intravenous Given 10/3/18 1937)   ondansetron (ZOFRAN) injection 4 mg (4 mg Intravenous Given 10/3/18 1934)       Assessments & Plan      I have reviewed the nursing notes.    I have reviewed the findings, diagnosis, plan and need for follow up with the patient.          New Prescriptions    No medications on file       Final diagnoses:   Chest pain, unspecified type   Anxiety       10/3/2018   [facility] EMERGENCY DEPARTMENT     Collin Chahal MD  10/03/18 2024

## 2018-10-04 NOTE — ED NOTES
Accessed the CVC for blood work and medication administration.  Heparin flushed it prior to discharge.  Reviewed discharge instructions with pt and SO.  No additional questions or concerns.

## 2018-10-04 NOTE — PROGRESS NOTES
Clinic Care Coordination Contact  [facility]       Clinical Data: Care Coordinator Outreach  Outreach attempted x 1 and X2, no answer and phone was busy.  Plan: Care Coordinator mailed out care coordination introduction letter on 4/4/16. Care Coordinator will try to reach patient again in 1-2 business days.    Melissa Behl BSN, RN, N  [facility] Care Coordinator  [phone]

## 2018-10-04 NOTE — DISCHARGE INSTRUCTIONS
Stop the Prozac.  Follow-up in clinic on Friday as scheduled.    Your EKG and troponin were both normal which is reassuring that this is not due to your heart.  Continue the rest of your medications.  It was nice visiting with both of you tonight.  I hope this settles down quickly for you.    Thank you for choosing [facility]. We appreciate the opportunity to meet your urgent medical needs. Please let us know if we could have done anything to make your stay more satisfying.    After discharge, please closely monitor for any new or worsening symptoms. Return to the Emergency Department if you develop any acute worsening signs or symptoms.    If you had lab work, cultures or imaging studies done during your stay, the final results may still be pending. We will call you if your plan of care needs to change. However, if you are not improving as expected, please follow up with your primary care provider or clinic.     Start any prescription medications that were prescribed to you and take them as directed.     Please see additional handouts that may be pertinent to your condition.

## 2018-10-05 ENCOUNTER — OFFICE VISIT (OUTPATIENT)
Dept: FAMILY MEDICINE | Facility: CLINIC | Age: 48
End: 2018-10-05
Payer: COMMERCIAL

## 2018-10-05 VITALS
WEIGHT: 191 LBS | RESPIRATION RATE: 16 BRPM | BODY MASS INDEX: 26.64 KG/M2 | HEART RATE: 76 BPM | TEMPERATURE: 97.1 F | SYSTOLIC BLOOD PRESSURE: 102 MMHG | DIASTOLIC BLOOD PRESSURE: 60 MMHG

## 2018-10-05 DIAGNOSIS — F41.9 ANXIETY: ICD-10-CM

## 2018-10-05 DIAGNOSIS — E46 MALNUTRITION, UNSPECIFIED TYPE (H): ICD-10-CM

## 2018-10-05 DIAGNOSIS — F90.0 ADHD (ATTENTION DEFICIT HYPERACTIVITY DISORDER), INATTENTIVE TYPE: Primary | ICD-10-CM

## 2018-10-05 DIAGNOSIS — E53.8 VITAMIN B12 DEFICIENCY WITHOUT ANEMIA: ICD-10-CM

## 2018-10-05 DIAGNOSIS — Z23 NEED FOR PROPHYLACTIC VACCINATION AND INOCULATION AGAINST INFLUENZA: ICD-10-CM

## 2018-10-05 PROCEDURE — 96372 THER/PROPH/DIAG INJ SC/IM: CPT | Performed by: FAMILY MEDICINE

## 2018-10-05 PROCEDURE — 99214 OFFICE O/P EST MOD 30 MIN: CPT | Mod: 25 | Performed by: FAMILY MEDICINE

## 2018-10-05 PROCEDURE — 90686 IIV4 VACC NO PRSV 0.5 ML IM: CPT | Performed by: FAMILY MEDICINE

## 2018-10-05 PROCEDURE — 90471 IMMUNIZATION ADMIN: CPT | Performed by: FAMILY MEDICINE

## 2018-10-05 RX ORDER — LORAZEPAM 1 MG/1
1 TABLET ORAL
Qty: 30 TABLET | Refills: 0 | Status: SHIPPED | OUTPATIENT
Start: 2018-10-05 | End: 2018-10-30

## 2018-10-05 RX ORDER — CYANOCOBALAMIN 1000 UG/ML
1 INJECTION, SOLUTION INTRAMUSCULAR; SUBCUTANEOUS
Qty: 1 ML | Refills: 11 | OUTPATIENT
Start: 2018-10-05 | End: 2018-11-21

## 2018-10-05 RX ORDER — DEXTROAMPHETAMINE SACCHARATE, AMPHETAMINE ASPARTATE, DEXTROAMPHETAMINE SULFATE AND AMPHETAMINE SULFATE 5; 5; 5; 5 MG/1; MG/1; MG/1; MG/1
20 TABLET ORAL DAILY
Qty: 30 TABLET | Refills: 0 | Status: SHIPPED | OUTPATIENT
Start: 2018-10-08 | End: 2018-10-30

## 2018-10-05 NOTE — PROGRESS NOTES
Clinic Care Coordination Contact  [facility]/Voicemail       Clinical Data: Care Coordinator Outreach  Outreach attempted x 2.  Left message on voicemail with call back information and requested return call.  Plan: Care Coordinator mailed out care coordination introduction letter on 4/4/16. Care Coordinator will try to reach patient again in 3-5 business days.    Melissa Behl BSN, RN, [facility] Care Coordinator  [phone]

## 2018-10-05 NOTE — NURSING NOTE
The following medication was given:     MEDICATION: Vitamin B12  1,000 mcg  ROUTE: IM  SITE: Deltoid - Right  DOSE: 1mL  LOT #: [id number]  :  [employer]  EXPIRATION DATE:  06/2020  NDC#: 63647-598-20

## 2018-10-05 NOTE — MR AVS SNAPSHOT
After Visit Summary   10/5/2018    Parker Acevedo     MRN: [id number]           Patient Information     Date Of Birth          1970        Visit Information        Provider Department      10/5/2018 10:00 AM Esteban Daly MD [facility]        Today's Diagnoses     ADHD (attention deficit hyperactivity disorder), inattentive type    -  1    Need for prophylactic vaccination and inoculation against influenza        Anxiety        Vitamin B12 deficiency without anemia        Bariatric surgery status          Care Instructions    Cautious use of Ativan 1 mg at bedtime for anxiety--be careful of decreased respirations.      Esteban Daly MD            Follow-ups after your visit        Follow-up notes from your care team     Return in about 3 weeks (around 10/29/2018) for With Dr. Isaac.      Your next 10 appointments already scheduled     Oct 12, 2018  3:00 PM CDT   Office Visit with Chuy Isaac MD   [facility] ([address] [id number] [phone]           Bring a current list of meds and any records pertaining to this visit. For Physicals, please bring immunization records and any forms needing to be filled out. Please arrive 10 minutes early to complete paperwork.            Oct 25, 2018  2:45 PM CDT   Office Visit with Omar Wren MD   [facility] ([address] [address] [phone]           Bring a current list of meds and any records pertaining to this visit. For Physicals, please bring immunization records and any forms needing to be filled out. Please arrive 10 minutes early to complete paperwork.            Oct 29, 2018 10:30 AM CDT   Return Visit with Patti Milligan MD   [facility] ([facility])    [address] [facility] [address] [address] [phone]               Who to contact     If you have questions or need follow up information about today's clinic visit or your schedule please contact [facility] directly at [phone].  Normal or non-critical lab and imaging results will be communicated to you by [employer] Systemshart, letter or phone within 4 business days after the clinic has received the results. If you do not hear from us within 7 days, please contact the clinic through Web[employer] Systemshart or phone. If you have a critical or abnormal lab result, we will notify you by phone as soon as possible.  Submit refill requests through [employer] or call your pharmacy and they will forward the refill request to us. Please allow 3 business days for your refill to be completed.          Additional Information About Your Visit        Dress Code Information     D[employer] Code gives you secure access to your electronic health record. If you see a primary care provider, you can also send messages to your care team and make appointments. If you have questions, please call your primary care clinic.  If you do not have a primary care provider, please call [phone] and they will assist you.        Care EveryWhere ID     This is your Care EveryWhere ID. This could be used by other organizations to access your [facility] medical records  [id number]        Your Vitals Were     Pulse Temperature Respirations BMI (Body Mass Index)          76 97.1  F (36.2  C) (Temporal) 16 26.64 kg/m2         Blood Pressure from Last 3 Encounters:   10/05/18 102/60   10/03/18 129/87   09/18/18 138/84    Weight from Last 3 Encounters:   10/05/18 191 lb (86.6 kg)   10/03/18 176 lb (79.8 kg)   09/18/18 171 lb (77.6 kg)              Today, you had the following     No orders found for display         Today's Medication Changes          These changes are accurate as of 10/5/18 10:25 AM.  If you have any questions, ask your nurse or doctor.               Start taking these medicines.        Dose/Directions    LORazepam 1  MG tablet   Commonly known as:  ATIVAN   Used for:  Anxiety   Started by:  Esteban Daly MD        Dose:  1 mg   Take 1 tablet (1 mg) by mouth nightly as needed for anxiety or other (sleep)   Quantity:  30 tablet   Refills:  0         These medicines have changed or have updated prescriptions.        Dose/Directions    amphetamine-dextroamphetamine 20 MG per tablet   Commonly known as:  ADDERALL   This may have changed:  These instructions start on 10/8/2018. If you are unsure what to do until then, ask your doctor or other care provider.   Used for:  ADHD (attention deficit hyperactivity disorder), inattentive type   Changed by:  Esteban Daly MD        Dose:  20 mg   Start taking on:  10/8/2018   Take 1 tablet (20 mg) by mouth daily   Quantity:  30 tablet   Refills:  0       sucralfate 1 GM/10ML suspension   Commonly known as:  CARAFATE   This may have changed:    - when to take this  - reasons to take this   Used for:  Nausea        Dose:  1 g   Take 10 mLs (1 g) by mouth 4 times daily   Quantity:  1200 mL   Refills:  11       vitamin D 13596 UNIT capsule   Commonly known as:  ERGOCALCIFEROL   This may have changed:  additional instructions   Used for:  Vitamin D deficiency        Dose:  08742 Units   Take 1 capsule (50,000 Units) by mouth every 7 days   Quantity:  12 capsule   Refills:  3            Where to get your medicines      Some of these will need a paper prescription and others can be bought over the counter.  Ask your nurse if you have questions.     Bring a paper prescription for each of these medications     amphetamine-dextroamphetamine 20 MG per tablet    LORazepam 1 MG tablet       You don't need a prescription for these medications     cyanocobalamin 1000 MCG/ML injection                Primary Care Provider Office Phone # Fax #    Esteban Daly [phone] [address] [facility] [address]        Goals        General    Medical (pt-stated)      Notes - Note created  8/14/2018 12:26 PM by Behl, Melissa K, RN    Goal Statement: I will establish care with a new PCP prior to my current PCP's intermediate.  Measure of Success: Patient will schedule an attend an appointment with a new PCP.  Supportive Steps to Achieve: RNCC reviewed [facility] locations.  Barriers: unknown  Strengths: has support from spouse  Date to Achieve By: 11/9/18  Patient expressed understanding of goal: yes           Equal Access to Services     KATHRYN GUZMAN AH: Hadii kameron huff hadasho Soomaali, waaxda luqadaha, qaybta kaalmada adeegyada, carmela woodдмитрийthalia dumont . So [facility] [phone].    ATENCIÓN: Si habla español, tiene a hicks disposición servicios gratuitos de asistencia lingüística. Llame al [phone].    We comply with applicable federal civil rights laws and [place] laws. We do not discriminate on the basis of race, color, national origin, age, disability, sex, sexual orientation, or gender identity.            Thank you!     Thank you for choosing [facility]  for your care. Our goal is always to provide you with excellent care. Hearing back from our patients is one way we can continue to improve our services. Please take a few minutes to complete the written survey that you may receive in the mail after your visit with us. Thank you!             Your Updated Medication List - Protect others around you: Learn how to safely use, store and throw away your medicines at www.disposemymeds.org.          This list is accurate as of 10/5/18 10:25 AM.  Always use your most recent med list.                   Brand Name Dispense Instructions for use Diagnosis    acetaminophen 500 MG tablet    TYLENOL     Take 1,000 mg by mouth every 6 hours as needed for fever        albuterol 108 (90 Base) MCG/ACT inhaler    PROAIR HFA/PROVENTIL HFA/VENTOLIN HFA    1 Inhaler    Inhale 2 puffs into the lungs every 4 hours as needed for shortness of breath / dyspnea or wheezing  "   Productive cough       amphetamine-dextroamphetamine 20 MG per tablet   Start taking on:  10/8/2018    ADDERALL    30 tablet    Take 1 tablet (20 mg) by mouth daily    ADHD (attention deficit hyperactivity disorder), inattentive type       COREG 12.5 MG tablet   Generic drug:  carvedilol      Take 12.5 mg by mouth 2 times daily (with meals)        cyanocobalamin 1000 MCG/ML injection    VITAMIN B12    1 mL    Inject 1 mL (1,000 mcg) into the muscle every 30 days    Bariatric surgery status       [facility] PATIENT      Contact [facility] for patient specific medication information at [phone] on admission and discharge from the hospital.  Phones are answered 24 hours a day 7 days a week 365 days a year.  Providers - Choose \"CONTINUE HOME MED (no script)\" at discharge if patient treatment with home infusion will continue.    S/P bariatric surgery       fentaNYL 25 mcg/hr 72 hr patch    DURAGESIC    15 patch    Place 1 patch onto the skin every 48 hours remove old patch.  Release on/after 10/6/18 to start on/after 10/8/18.    Chronic abdominal pain, Encounter for long-term opiate analgesic use       lidocaine 5 % Patch    LIDODERM    60 patch    Place 1-2 patches onto the skin every 24 hours Wear for 12 hours, remove for 12 hours.  OK to cut to better fit to size.    Chronic bilateral low back pain without sciatica, Chronic abdominal pain, Myofascial pain       lidocaine-prilocaine cream    EMLA    30 g    Apply topically as needed for moderate pain    Pain       LORazepam 1 MG tablet    ATIVAN    30 tablet    Take 1 tablet (1 mg) by mouth nightly as needed for anxiety or other (sleep)    Anxiety       Needle (Disp) 27G X 1/2\" Misc    BD DISP NEEDLES    3 each    1 Device every 30 days Use for cyanocobalamin injection once q 30 days.    Bariatric surgery status       ondansetron 8 MG ODT tab    ZOFRAN-ODT    90 tablet    Take 1 tablet (8 mg) by mouth every 8 hours as needed for " nausea    Nausea       * order for DME     12 each    Injection Supplies for Vitamin B12: 3cc syringes w/ 27 gauge needles, 1/2 inch length    Bariatric surgery status       * order for DME     4 each    Equipment being ordered: Bilateral knee high chronic venous insufficiency stockings--  mild-moderate pressures.    Bilateral edema of lower extremity       oxyCODONE 5 MG/5ML solution    ROXICODONE    1350 mL    Take 10-15 mLs (10-15 mg) by mouth every 4 hours as needed for moderate to severe pain Max of 45 mg/day this month. Weaning dose as discussed. Fill on/after 10/6/18 not to start untill 10/8/18.    Encounter for long-term opiate analgesic use, Chronic abdominal pain       parenteral nutrition - PTA/DISCHARGE ORDER      [facility] to resume previous home TPN but run x 24 hours the first day until labs are ok. Then cycle x 14 hours per previous home regimen. He has been off TPN x 5 days here.        sucralfate 1 GM/10ML suspension    CARAFATE    1200 mL    Take 10 mLs (1 g) by mouth 4 times daily    Nausea       vitamin D 48929 UNIT capsule    ERGOCALCIFEROL    12 capsule    Take 1 capsule (50,000 Units) by mouth every 7 days    Vitamin D deficiency       * Notice:  This list has 2 medication(s) that are the same as other medications prescribed for you. Read the directions carefully, and ask your doctor or other care provider to review them with you.

## 2018-10-05 NOTE — PATIENT INSTRUCTIONS
Cautious use of Ativan 1 mg at bedtime for anxiety--be careful of decreased respirations.      Esteban Daly MD

## 2018-10-08 ENCOUNTER — HOME INFUSION (PRE-WILLOW HOME INFUSION) (OUTPATIENT)
Dept: PHARMACY | Facility: CLINIC | Age: 48
End: 2018-10-08

## 2018-10-08 ENCOUNTER — TELEPHONE (OUTPATIENT)
Dept: ENDOCRINOLOGY | Facility: CLINIC | Age: 48
End: 2018-10-08

## 2018-10-08 ENCOUNTER — HOSPITAL ENCOUNTER (OUTPATIENT)
Facility: CLINIC | Age: 48
Setting detail: SPECIMEN
Discharge: HOME | DRG: 315 | End: 2018-10-08
Admitting: SURGERY
Payer: COMMERCIAL

## 2018-10-08 LAB
ALBUMIN SERPL-MCNC: 3.5 G/DL (ref 3.4–5)
ALP SERPL-CCNC: 90 U/L (ref 40–150)
ALT SERPL W P-5'-P-CCNC: 26 U/L (ref 0–70)
ANION GAP SERPL CALCULATED.3IONS-SCNC: 7 MMOL/L (ref 3–14)
AST SERPL W P-5'-P-CCNC: 15 U/L (ref 0–45)
BASOPHILS # BLD AUTO: 0 10E9/L (ref 0–0.2)
BASOPHILS NFR BLD AUTO: 0.2 %
BILIRUB DIRECT SERPL-MCNC: <0.1 MG/DL (ref 0–0.2)
BILIRUB SERPL-MCNC: 0.3 MG/DL (ref 0.2–1.3)
BUN SERPL-MCNC: 13 MG/DL (ref 7–30)
CALCIUM SERPL-MCNC: 8 MG/DL (ref 8.5–10.1)
CHLORIDE SERPL-SCNC: 105 MMOL/L (ref 94–109)
CO2 SERPL-SCNC: 30 MMOL/L (ref 20–32)
CREAT SERPL-MCNC: 0.82 MG/DL (ref 0.66–1.25)
DIFFERENTIAL METHOD BLD: ABNORMAL
EOSINOPHIL # BLD AUTO: 0.2 10E9/L (ref 0–0.7)
EOSINOPHIL NFR BLD AUTO: 3.3 %
ERYTHROCYTE [DISTWIDTH] IN BLOOD BY AUTOMATED COUNT: 13.6 % (ref 10–15)
GFR SERPL CREATININE-BSD FRML MDRD: >90 ML/MIN/1.7M2
GLUCOSE SERPL-MCNC: 79 MG/DL (ref 70–99)
HCT VFR BLD AUTO: 38.6 % (ref 40–53)
HGB BLD-MCNC: 12.3 G/DL (ref 13.3–17.7)
IMM GRANULOCYTES # BLD: 0 10E9/L (ref 0–0.4)
IMM GRANULOCYTES NFR BLD: 0.2 %
LYMPHOCYTES # BLD AUTO: 1.2 10E9/L (ref 0.8–5.3)
LYMPHOCYTES NFR BLD AUTO: 19.4 %
MAGNESIUM SERPL-MCNC: 2 MG/DL (ref 1.6–2.3)
MCH RBC QN AUTO: 30.6 PG (ref 26.5–33)
MCHC RBC AUTO-ENTMCNC: 31.9 G/DL (ref 31.5–36.5)
MCV RBC AUTO: 96 FL (ref 78–100)
MONOCYTES # BLD AUTO: 0.8 10E9/L (ref 0–1.3)
MONOCYTES NFR BLD AUTO: 13.9 %
NEUTROPHILS # BLD AUTO: 3.8 10E9/L (ref 1.6–8.3)
NEUTROPHILS NFR BLD AUTO: 63 %
NRBC # BLD AUTO: 0 10*3/UL
NRBC BLD AUTO-RTO: 0 /100
PHOSPHATE SERPL-MCNC: 2.9 MG/DL (ref 2.5–4.5)
PLATELET # BLD AUTO: 121 10E9/L (ref 150–450)
POTASSIUM SERPL-SCNC: 3.6 MMOL/L (ref 3.4–5.3)
PREALB SERPL IA-MCNC: 23 MG/DL (ref 15–45)
PROT SERPL-MCNC: 6.7 G/DL (ref 6.8–8.8)
RBC # BLD AUTO: 4.02 10E12/L (ref 4.4–5.9)
SODIUM SERPL-SCNC: 142 MMOL/L (ref 133–144)
TRIGL SERPL-MCNC: 138 MG/DL
WBC # BLD AUTO: 6 10E9/L (ref 4–11)

## 2018-10-08 PROCEDURE — 87181 SC STD AGAR DILUTION PER AGT: CPT | Performed by: SURGERY

## 2018-10-08 PROCEDURE — 83785 ASSAY OF MANGANESE: CPT | Performed by: SURGERY

## 2018-10-08 PROCEDURE — 83735 ASSAY OF MAGNESIUM: CPT | Performed by: SURGERY

## 2018-10-08 PROCEDURE — 87800 DETECT AGNT MULT DNA DIREC: CPT | Performed by: SURGERY

## 2018-10-08 PROCEDURE — 80053 COMPREHEN METABOLIC PANEL: CPT | Performed by: SURGERY

## 2018-10-08 PROCEDURE — 82248 BILIRUBIN DIRECT: CPT | Performed by: SURGERY

## 2018-10-08 PROCEDURE — 87186 SC STD MICRODIL/AGAR DIL: CPT | Performed by: SURGERY

## 2018-10-08 PROCEDURE — 84478 ASSAY OF TRIGLYCERIDES: CPT | Performed by: SURGERY

## 2018-10-08 PROCEDURE — 87040 BLOOD CULTURE FOR BACTERIA: CPT | Performed by: SURGERY

## 2018-10-08 PROCEDURE — 85025 COMPLETE CBC W/AUTO DIFF WBC: CPT | Performed by: SURGERY

## 2018-10-08 PROCEDURE — 84100 ASSAY OF PHOSPHORUS: CPT | Performed by: SURGERY

## 2018-10-08 PROCEDURE — 87077 CULTURE AEROBIC IDENTIFY: CPT | Performed by: SURGERY

## 2018-10-08 PROCEDURE — 84134 ASSAY OF PREALBUMIN: CPT | Performed by: SURGERY

## 2018-10-08 NOTE — TELEPHONE ENCOUNTER
Please call and approve having a blood culture obtained for fever.    A standing order is in place for [facility] labs.    Esteban Daly MD  Please close encounter when call/work completed.

## 2018-10-08 NOTE — TELEPHONE ENCOUNTER
Reason for call:  Order   Order or referral being requested: Order to get culture drawn from patient  Reason for request: Need an order so they can do it at home  Date needed: as soon as possible  Has the patient been seen by the PCP for this problem? Not Applicable    Additional comments: Need order before 11 today as patient will be seen at noon.    Phone number to reach patient:  Other phone number:  [phone]    Best Time:  Any    Can we leave a detailed message on this number?  Not Applicable     Have you had Bariatric surgery in the past?  NO

## 2018-10-09 ENCOUNTER — TELEPHONE (OUTPATIENT)
Dept: FAMILY MEDICINE | Facility: CLINIC | Age: 48
End: 2018-10-09

## 2018-10-09 ENCOUNTER — MYC MEDICAL ADVICE (OUTPATIENT)
Dept: FAMILY MEDICINE | Facility: CLINIC | Age: 48
End: 2018-10-09

## 2018-10-09 ENCOUNTER — HOSPITAL ENCOUNTER (INPATIENT)
Facility: CLINIC | Age: 48
LOS: 4 days | Discharge: HOME | DRG: 315 | End: 2018-10-14
Attending: EMERGENCY MEDICINE | Admitting: INTERNAL MEDICINE
Payer: COMMERCIAL

## 2018-10-09 ENCOUNTER — DOCUMENTATION ONLY (OUTPATIENT)
Dept: CARE COORDINATION | Facility: CLINIC | Age: 48
End: 2018-10-09

## 2018-10-09 DIAGNOSIS — R78.81 RECURRENT BACTEREMIA: ICD-10-CM

## 2018-10-09 DIAGNOSIS — Z78.9 ON TOTAL PARENTERAL NUTRITION: ICD-10-CM

## 2018-10-09 DIAGNOSIS — R78.81 BACTEREMIA: ICD-10-CM

## 2018-10-09 DIAGNOSIS — G89.29 OTHER CHRONIC PAIN: ICD-10-CM

## 2018-10-09 PROCEDURE — 99285 EMERGENCY DEPT VISIT HI MDM: CPT | Mod: Z6 | Performed by: EMERGENCY MEDICINE

## 2018-10-09 PROCEDURE — 99285 EMERGENCY DEPT VISIT HI MDM: CPT | Mod: 25 | Performed by: EMERGENCY MEDICINE

## 2018-10-09 PROCEDURE — 36415 COLL VENOUS BLD VENIPUNCTURE: CPT | Performed by: EMERGENCY MEDICINE

## 2018-10-09 NOTE — TELEPHONE ENCOUNTER
Left a message asking pt to call the clinic. Please transfer to [facility]    DA received a call that the pt has a positive blood culture. He should go to the ED.    Nafisa Aponte RN, BSN

## 2018-10-09 NOTE — TELEPHONE ENCOUNTER
Received calls from the lab reporting positive blood cultures. DA aware. Pt/wife already notified and going to the ED at the [facility].    Nafisa Aponte, RN, BSN

## 2018-10-09 NOTE — IP AVS SNAPSHOT
MRN:[id number]                      After Visit Summary   10/9/2018    Parker Acevedo     MRN: [id number]           Thank you!     Thank you for choosing [facility] for your care. Our goal is always to provide you with excellent care. Hearing back from our patients is one way we can continue to improve our services. Please take a few minutes to complete the written survey that you may receive in the mail after you visit with us. Thank you!        Patient Information     Date Of Birth          1970        Designated Caregiver       Most Recent Value    Caregiver    Will someone help with your care after discharge? yes    Name of designated caregiver Roxanne MOYER    Phone number of caregiver 1     Caregiver address [address], [place], [place], [address]      About your hospital stay     You were admitted on:  October 10, 2018 You last received care in the:  [address] [facility]    You were discharged on:  October 14, 2018        Reason for your hospital stay       You were hospitalized for fevers and body aches and were found to have another blood stream infection related to your port. You were given IV antibiotics in an effort to salvage your port. Unfortunately, bacteria continued to grow through the Cm catheter and it had to be removed. A PICC line was placed and you were able to discharge to home.                  Who to Call     For medical emergencies, please call 911.  For non-urgent questions about your medical care, please call your primary care provider or clinic, [phone]          Attending Provider     Provider Specialty    Ramona Oviedo MD Emergency Medicine    Rohit, Cecilia Santiago MD Internal Medicine    [place], Álvaro Katz MD Internal Medicine       Primary Care Provider Office Phone # Fax #    Esteban Daly [phone]       When to contact your care team       Call your primary doctor if you have any of the  following: temperature greater than 100.4, increased drainage, increased swelling or increased pain.                  After Care Instructions     Activity       Your activity upon discharge: activity as tolerated            Diet       Follow this diet upon discharge: Orders Placed This Encounter      Regular Diet Adult                  Follow-up Appointments     Adult [facility] Follow-up and recommended labs and tests       Follow up with primary care provider, Esteban Daly, in 2 weeks, for hospital follow- up. The following labs/tests are recommended: Blood Cultures.   Follow up with Infectious Disease in 2-4 weeks.     Appointments on [facility] and/or [facility] (with [facility] or [facility] provider or service). Call [phone] if you haven't heard regarding these appointments within 7 days of discharge.                  Your next 10 appointments already scheduled     Oct 25, 2018  2:45 PM CDT   [facility]    [address] [facility] [address] [phone]           Bring a current list of meds and any records pertaining to this visit. For Physicals, please bring immunization records and any forms needing to be filled out. Please arrive 10 minutes early to complete paperwork.            Oct 29, 2018 10:30 AM CDT   Return Visit with Patti Milligan MD   [facility] ([facility])    [address] [place] [id number] [phone]              Additional Services     Home care nursing referral       [facility] [phone]  Fax [phone]    RN skilled nursing visit. RN to assess vital signs and weight, respiratory and cardiac status, pain level and activity tolerance, hydration, nutrition and bowel status and home safety.  Port line cares    Your provider has ordered home care nursing services. If you have not been contacted within 2 days of your  discharge please call the inpatient department phone number at [phone] .            Home infusion referral       Your provider has referred you to: [facility]: [facility] [phone]   [email or web address]    Local Address (if different from home address): N/A    Anticipated Length of Therapy: per MD order    Home Infusion Pharmacist to adjust therapy based on labs and clinical assessments: Yes    Labs:  May draw labs from Venous Catheter: Yes  Home Infusion Pharmacist to order labs based on therapy type and clinical assessments: Yes  Call/Fax Lab Results to: pcp    Agency Staff to assess nursing needs for Infusion Therapy.    Access Device Management:  IV Access Type: Port-a-Cath  Flush with Heparin and Normal Saline IVP PRN and routine site care (per agency protocol) to maintain access device? Yes    TPN and supplies  IV abx and supplies  Please resume home IV hydration per previous order. Two liters normal saline per day as needed.                  Pending Results     Date and Time Order Name Status Description    10/14/2018 1044 XR Chest 1 View In process     10/13/2018 2242 Blood culture Preliminary     10/13/2018 2242 Blood culture Preliminary     10/13/2018 2242 Catheter Tip Culture Aerobic Bacterial In process     10/13/2018 1659 IR CVC Tunnel Removal Right In process     10/13/2018 1043 Blood culture Preliminary     10/13/2018 0000 Blood culture Preliminary     10/13/2018 0000 Prealbumin In process     10/12/2018 1134 Blood culture Preliminary     10/12/2018 0000 Blood culture Preliminary     10/12/2018 0000 Blood culture Preliminary     10/11/2018 0020 Blood culture Preliminary     10/11/2018 0020 Blood culture Preliminary     10/10/2018 0010 Blood culture Preliminary     10/10/2018 0010 Blood culture Preliminary             Statement of Approval     Ordered          10/14/18 1057  I have reviewed and agree with all the recommendations and orders  "detailed in this document.  EFFECTIVE NOW     Approved and electronically signed by:  Álvaro Dickerson MD             Admission Information     Date & Time Provider Department Dept. Phone    10/9/2018 Álvaro Dickerson MD [address] [facility] [phone]      Your Vitals Were     Blood Pressure Pulse Temperature Respirations Height Weight    118/63 (BP Location: Left arm) 92 98.7  F (37.1  C) (Oral) 18 1.816 m (5' 11.5\") 87.5 kg (192 lb 14.4 oz)    Pulse Oximetry BMI (Body Mass Index)                97% 26.53 kg/m2          MyChart Information     [facility] gives you secure access to your electronic health record. If you see a primary care provider, you can also send messages to your care team and make appointments. If you have questions, please call your primary care clinic.  If you do not have a primary care provider, please call [phone] and they will assist you.        Care EveryWhere ID     This is your Care EveryWhere ID. This could be used by other organizations to access your [facility] medical records  [id number]        Equal Access to Services     [facility] : Hadii kameron reyes Soheike, waaxda lupatric, qaybta kaalaudelia stock, carmela rizvi. So [facility] [phone].    ATENCIÓN: Si habla español, tiene a hicks disposición servicios gratuitos de asistencia lingüística. hCu al [phone].    We comply with applicable federal civil rights laws and [place] laws. We do not discriminate on the basis of race, color, national origin, age, disability, sex, sexual orientation, or gender identity.               Review of your medicines      START taking        Dose / Directions    cefTRIAXone 2 GM vial   Commonly known as:  ROCEPHIN   Indication:  Bacteria in the Blood   Used for:  Recurrent bacteremia        Dose:  2 g   Inject 2 g into the vein every 24 hours   Quantity:  14 each   Refills:  0         CONTINUE these medicines which may have CHANGED, or have new " prescriptions. If we are uncertain of the size of tablets/capsules you have at home, strength may be listed as something that might have changed.        Dose / Directions    sucralfate 1 GM/10ML suspension   Commonly known as:  CARAFATE   This may have changed:    - when to take this  - reasons to take this   Used for:  Nausea        Dose:  1 g   Take 10 mLs (1 g) by mouth 4 times daily   Quantity:  1200 mL   Refills:  11       vitamin D 60830 UNIT capsule   Commonly known as:  ERGOCALCIFEROL   This may have changed:  additional instructions   Used for:  Vitamin D deficiency        Dose:  04756 Units   Take 1 capsule (50,000 Units) by mouth every 7 days   Quantity:  12 capsule   Refills:  3         CONTINUE these medicines which have NOT CHANGED        Dose / Directions    albuterol 108 (90 Base) MCG/ACT inhaler   Commonly known as:  PROAIR HFA/PROVENTIL HFA/VENTOLIN HFA   Used for:  Productive cough        Dose:  2 puff   Inhale 2 puffs into the lungs every 4 hours as needed for shortness of breath / dyspnea or wheezing   Quantity:  1 Inhaler   Refills:  3       amphetamine-dextroamphetamine 20 MG per tablet   Commonly known as:  ADDERALL   Used for:  ADHD (attention deficit hyperactivity disorder), inattentive type        Dose:  20 mg   Take 1 tablet (20 mg) by mouth daily   Quantity:  30 tablet   Refills:  0       carvedilol 6.25 MG tablet   Commonly known as:  COREG        Dose:  6.25 mg   Take 6.25 mg by mouth 2 times daily (with meals)   Refills:  0       cyanocobalamin 1000 MCG/ML injection   Commonly known as:  VITAMIN B12        Dose:  1 mL   Inject 1 mL (1,000 mcg) into the muscle every 30 days   Quantity:  1 mL   Refills:  11       [facility] MANAGED PATIENT   Used for:  S/P bariatric surgery        Contact [facility] for patient specific medication information at [phone] on admission and discharge from the hospital.  Phones are answered 24 hours a day 7 days a week 365 days  "a year.  Providers - Choose \"CONTINUE HOME MED (no script)\" at discharge if patient treatment with home infusion will continue.   Refills:  0       fentaNYL 25 mcg/hr 72 hr patch   Commonly known as:  DURAGESIC   Used for:  Chronic abdominal pain, Encounter for long-term opiate analgesic use        Dose:  1 patch   Place 1 patch onto the skin every 48 hours remove old patch.  Release on/after 10/6/18 to start on/after 10/8/18.   Quantity:  15 patch   Refills:  0       lidocaine 5 % Patch   Commonly known as:  LIDODERM   Used for:  Chronic bilateral low back pain without sciatica, Chronic abdominal pain, Myofascial pain        Dose:  1-2 patch   Place 1-2 patches onto the skin every 24 hours Wear for 12 hours, remove for 12 hours.  OK to cut to better fit to size.   Quantity:  60 patch   Refills:  6       lidocaine-prilocaine cream   Commonly known as:  EMLA   Used for:  Pain        Apply topically as needed for moderate pain   Quantity:  30 g   Refills:  3       LORazepam 1 MG tablet   Commonly known as:  ATIVAN   Used for:  Anxiety        Dose:  1 mg   Take 1 tablet (1 mg) by mouth nightly as needed for anxiety or other (sleep)   Quantity:  30 tablet   Refills:  0       Needle (Disp) 27G X 1/2\" Misc   Commonly known as:  BD DISP NEEDLES   Used for:  Bariatric surgery status        Dose:  1 Device   1 Device every 30 days Use for cyanocobalamin injection once q 30 days.   Quantity:  3 each   Refills:  4       ondansetron 8 MG ODT tab   Commonly known as:  ZOFRAN-ODT   Used for:  Nausea        Dose:  8 mg   Take 1 tablet (8 mg) by mouth every 8 hours as needed for nausea   Quantity:  90 tablet   Refills:  3       * order for DME   Used for:  Bariatric surgery status        Injection Supplies for Vitamin B12: 3cc syringes w/ 27 gauge needles, 1/2 inch length   Quantity:  12 each   Refills:  0       * order for DME   Used for:  Bilateral edema of lower extremity        Equipment being ordered: Bilateral knee high " chronic venous insufficiency stockings--  mild-moderate pressures.   Quantity:  4 each   Refills:  5       oxyCODONE 5 MG/5ML solution   Commonly known as:  ROXICODONE   Used for:  Encounter for long-term opiate analgesic use, Chronic abdominal pain        Dose:  10-15 mg   Take 10-15 mLs (10-15 mg) by mouth every 4 hours as needed for moderate to severe pain Max of 45 mg/day this month. Weaning dose as discussed. Fill on/after 10/6/18 not to start untill 10/8/18.   Quantity:  1350 mL   Refills:  0       parenteral nutrition - PTA/DISCHARGE ORDER        TPN+lipid Formula per [facility] 10/10/18: volume 1800 mL; AA 95g/d; Dex 235g/d; Intralipid 20% 350 mL; sodium 30 mEq/d; potassium 100 mEq/d; magnesium 16 mEq/d; phosphate 50 mMol/d; infuvite adult 10 mL/d; MTE-5 3mL/d; Cl:Ace 1:2; Infuse intravenously over 14 hours Monday-Friday.  Plain TPN Formula per [facility] 10/10/18: volume 2150 mL; all other ingredients identical to lipid formula. Infuse intravenously over 14 hours Saturday and Sunday.   Refills:  0       sodium chloride 0.9 % infusion        Dose:  3920-4350 mL   Inject 1,000-2,000 mLs into the vein as needed   Refills:  0       * Notice:  This list has 2 medication(s) that are the same as other medications prescribed for you. Read the directions carefully, and ask your doctor or other care provider to review them with you.         Where to get your medicines      Some of these will need a paper prescription and others can be bought over the counter. Ask your nurse if you have questions.     Bring a paper prescription for each of these medications     cefTRIAXone 2 GM vial                Protect others around you: Learn how to safely use, store and throw away your medicines at www.disposemymeds.org.        ANTIBIOTIC INSTRUCTION     You've Been Prescribed an Antibiotic - Now What?  Your healthcare team thinks that you or your loved one might have an infection. Some infections can be treated with antibiotics, which are  powerful, life-saving drugs. Like all medications, antibiotics have side effects and should only be used when necessary. There are some important things you should know about your antibiotic treatment.      Your healthcare team may run tests before you start taking an antibiotic.    Your team may take samples (e.g., from your blood, urine or other areas) to run tests to look for bacteria. These test can be important to determine if you need an antibiotic at all and, if you do, which antibiotic will work best.      Within a few days, your healthcare team might change or even stop your antibiotic.    Your team may start you on an antibiotic while they are working to find out what is making you sick.    Your team might change your antibiotic because test results show that a different antibiotic would be better to treat your infection.    In some cases, once your team has more information, they learn that you do not need an antibiotic at all. They may find out that you don't have an infection, or that the antibiotic you're taking won't work against your infection. For example, an infection caused by a virus can't be treated with antibiotics. Staying on an antibiotic when you don't need it is more likely to be harmful than helpful.      You may experience side effects from your antibiotic.    Like all medications, antibiotics have side effects. Some of these can be serious.    Let you healthcare team know if you have any known allergies when you are admitted to the hospital.    One significant side effect of nearly all antibiotics is the risk of severe and sometimes deadly diarrhea caused by Clostridium difficile (C. Difficile). This occurs when a person takes antibiotics because some good germs are destroyed. Antibiotic use allows C. diificile to take over, putting patients at high risk for this serious infection.    As a patient or caregiver, it is important to understand your or your loved one's antibiotic treatment.  It is especially important for caregivers to speak up when patients can't speak for themselves. Here are some important questions to ask your healthcare team.    What infection is this antibiotic treating and how do you know I have that infection?    What side effects might occur from this antibiotic?    How long will I need to take this antibiotic?    Is it safe to take this antibiotic with other medications or supplements (e.g., vitamins) that I am taking?     Are there any special directions I need to know about taking this antibiotic? For example, should I take it with food?    How will I be monitored to know whether my infection is responding to the antibiotic?    What tests may help to make sure the right antibiotic is prescribed for me?      Information provided by:  www.cdc.gov/getsmart  U.S. Department of Health and Human Services  Centers for disease Control and Prevention  National Center for Emerging and Zoonotic Infectious Diseases  Division of Healthcare Quality Promotion        PARENTERAL NUTRITION FORMULA      (Show up to 1 orders; newest on the left.)     Start date and time   10/13/2018 2000      parenteral nutrition - Clinimix E [[id number]]    Order Status  Active    Last Given  10/14/2018 0027       Macro Ingredients    clinimix E 4.25/5  2,000 mL       Additives    INFUVITE ADULT  10 mL    trace elements (Multitrace-5 Conc)  1 mL       Calorie Contribution    Proteins  340 kcal    Dextrose  340 kcal    Lipids  --    Total  680 kcal       Electrolyte Ion Calculated Amount    Sodium  70 mEq    Potassium  60 mEq    Calcium  9 mEq    Magnesium  10 mEq    Aluminum  --    Phosphate  30 mmol    Chloride  78 mEq    Acetate  140 mEq       Other    Total Protein  85 g    Total Protein/kg  1.06 g/kg    Glucose Infusion Rate  0.94 mg/kg/min    Osmolarity  824    Volume  2,000 mL    Rate  90 mL/hr    Dosing Weight  80 kg (Order-Specific)    Infusion Site  Peripheral       Admin Instructions       Infuse  "using a 0.22 micron filter.  Nurse Instructions: When TPN is discontinued, decrease rate to   of current rate for 1 hour. May continue at   rate until bag runs out or for 24h.    ** Reorder 3 hours in advance **                   Medication List: This is a list of all your medications and when to take them. Check marks below indicate your daily home schedule. Keep this list as a reference.      Medications           Morning Afternoon Evening Bedtime As Needed    albuterol 108 (90 Base) MCG/ACT inhaler   Commonly known as:  PROAIR HFA/PROVENTIL HFA/VENTOLIN HFA   Inhale 2 puffs into the lungs every 4 hours as needed for shortness of breath / dyspnea or wheezing                                amphetamine-dextroamphetamine 20 MG per tablet   Commonly known as:  ADDERALL   Take 1 tablet (20 mg) by mouth daily                                carvedilol 6.25 MG tablet   Commonly known as:  COREG   Take 6.25 mg by mouth 2 times daily (with meals)   Last time this was given:  6.25 mg on 10/14/2018  7:47 AM                                cefTRIAXone 2 GM vial   Commonly known as:  ROCEPHIN   Inject 2 g into the vein every 24 hours   Last time this was given:  2 g on 10/13/2018 12:47 PM                                cyanocobalamin 1000 MCG/ML injection   Commonly known as:  VITAMIN B12   Inject 1 mL (1,000 mcg) into the muscle every 30 days                                [place] [facility] MANAGED PATIENT   Contact [facility] for patient specific medication information at [phone] on admission and discharge from the hospital.  Phones are answered 24 hours a day 7 days a week 365 days a year.  Providers - Choose \"CONTINUE HOME MED (no script)\" at discharge if patient treatment with home infusion will continue.                                fentaNYL 25 mcg/hr 72 hr patch   Commonly known as:  DURAGESIC   Place 1 patch onto the skin every 48 hours remove old patch.  Release on/after 10/6/18 to start " "on/after 10/8/18.   Last time this was given:  1 patch on 10/14/2018  8:05 AM                                lidocaine 5 % Patch   Commonly known as:  LIDODERM   Place 1-2 patches onto the skin every 24 hours Wear for 12 hours, remove for 12 hours.  OK to cut to better fit to size.                                lidocaine-prilocaine cream   Commonly known as:  EMLA   Apply topically as needed for moderate pain                                LORazepam 1 MG tablet   Commonly known as:  ATIVAN   Take 1 tablet (1 mg) by mouth nightly as needed for anxiety or other (sleep)                                Needle (Disp) 27G X 1/2\" Misc   Commonly known as:  BD DISP NEEDLES   1 Device every 30 days Use for cyanocobalamin injection once q 30 days.                                ondansetron 8 MG ODT tab   Commonly known as:  ZOFRAN-ODT   Take 1 tablet (8 mg) by mouth every 8 hours as needed for nausea   Last time this was given:  8 mg on 10/13/2018 10:19 PM                                * order for DME   Injection Supplies for Vitamin B12: 3cc syringes w/ 27 gauge needles, 1/2 inch length                                * order for DME   Equipment being ordered: Bilateral knee high chronic venous insufficiency stockings--  mild-moderate pressures.                                oxyCODONE 5 MG/5ML solution   Commonly known as:  ROXICODONE   Take 10-15 mLs (10-15 mg) by mouth every 4 hours as needed for moderate to severe pain Max of 45 mg/day this month. Weaning dose as discussed. Fill on/after 10/6/18 not to start untill 10/8/18.   Last time this was given:  15 mg on 10/14/2018  6:51 AM                                parenteral nutrition - PTA/DISCHARGE ORDER   TPN+lipid Formula per \[facility]\"" 10/10/18: volume 1800 mL; AA 95g/d; Dex 235g/d; Intralipid 20% 350 mL; sodium 30 mEq/d; potassium 100 mEq/d; magnesium 16 mEq/d; phosphate 50 mMol/d; infuvite adult 10 mL/d; MTE-5 3mL/d; Cl:Ace 1:2; Infuse intravenously over 14 hours " Monday-Friday.  Plain TPN Formula per [facility] 10/10/18: volume 2150 mL; all other ingredients identical to lipid formula. Infuse intravenously over 14 hours Saturday and Sunday.                                sodium chloride 0.9 % infusion   Inject 1,000-2,000 mLs into the vein as needed   Last time this was given:  20 mLs on 10/14/2018 10:42 AM                                sucralfate 1 GM/10ML suspension   Commonly known as:  CARAFATE   Take 10 mLs (1 g) by mouth 4 times daily   Last time this was given:  1 g on 10/14/2018  6:50 AM                                vitamin D 15731 UNIT capsule   Commonly known as:  ERGOCALCIFEROL   Take 1 capsule (50,000 Units) by mouth every 7 days   Last time this was given:  50,000 Units on 10/14/2018  7:46 AM                                * Notice:  This list has 2 medication(s) that are the same as other medications prescribed for you. Read the directions carefully, and ask your doctor or other care provider to review them with you.

## 2018-10-09 NOTE — IP AVS SNAPSHOT
"    [address] [facility]: [phone]                                              INTERAGENCY TRANSFER FORM - PHYSICIAN ORDERS   10/9/2018                    Hospital Admission Date: 10/9/2018  SARA HERRERAGLORY    : 1970  Sex: Male        Attending Provider: Álvaro Dickerson MD     Allergies:  Bactrim [Sulfamethoxazole W/trimethoprim], Penicillins, Doxycycline, Vancomycin    Infection:  None   Service:  INTERNAL MED    Ht:  1.816 m (5' 11.5\")   Wt:  87.5 kg (192 lb 14.4 oz)   Admission Wt:  92.1 kg (203 lb 1.6 oz)    BMI:  26.53 kg/m 2   BSA:  2.1 m 2            Patient PCP Information     Provider PCP Type    Esteban Daly MD General      ED Clinical Impression     Diagnosis Description Comment Added By Time Added    Recurrent bacteremia [R78.81] Recurrent bacteremia [R78.81]  Ramona Oviedo MD 10/10/2018  2:11 AM    On total parenteral nutrition [Z78.9] On total parenteral nutrition [Z78.9]  Ramona Oviedo MD 10/10/2018  2:11 AM    Other chronic pain [G89.29] Other chronic pain [G89.29]  Ramona Oviedo MD 10/10/2018  2:11 AM      Hospital Problems as of 10/14/2018              Priority Class Noted POA    Bacteremia Medium  10/10/2018 Yes      Non-Hospital Problems as of 10/14/2018              Priority Class Noted    Peptic ulcer disease Medium  2010    Gastric bypass status for obesity Medium  2010    Chronic abdominal pain Medium  2012    Vomiting Medium  2012    Anemia Medium  2012    ADHD (attention deficit hyperactivity disorder), inattentive type Medium  2013    Vitamin B12 deficiency without anemia Medium  2013    Thiamine deficiency Medium  2013    Dehydration Medium  2013    Dysphagia Medium  2013    Weight loss, non-intentional Medium  2013    Malnutrition (H) Medium  2013    Chronic anxiety Medium  2013    Constipation Medium  10/1/2013    Bile reflux esophagitis Medium  10/16/2013    Former " smoker Medium  2/24/2014    Vitamin D deficiency Medium  5/22/2014    Iron deficiency Medium  5/23/2014    Health Care Home Medium  2/24/2015    Chronic pain Medium  7/7/2015    Insomnia Medium  8/13/2015    Positive blood culture Medium  3/29/2016    Fungemia Medium  4/11/2016    Chronic nausea Medium  5/10/2016    Gastrostomy tube in place (H) Medium  8/9/2016    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) Medium  Unknown    Short gut syndrome Medium  Unknown    Anxiety Medium  Unknown    Status post cervical spinal arthrodesis Medium  2/15/2017    Port or reservoir infection, initial encounter Medium  3/16/2017    Abnormal echocardiogram Medium  4/11/2017    Low serum iron Medium  9/8/2017    Anemia, iron deficiency Medium  9/8/2017    Catheter-related bloodstream infection (CRBSI) Medium  9/23/2017    Generalized weakness Medium  1/30/2018    S/P bariatric surgery Medium  7/30/2018    Hyperlipidemia LDL goal <130 Medium  8/7/2018      Code Status History     Date Active Date Inactive Code Status Order ID Comments User Context    10/14/2018  7:26 AM  Full Code [id number]  Álvaro Dickerson MD Outpatient    10/10/2018  7:39 AM 10/14/2018  7:26 AM Full Code [id number]  Eusebio Romero MD ED    6/6/2018 10:47 AM 10/10/2018  7:39 AM Full Code [id number]  Leana Rosales PA Outpatient    6/1/2018 10:38 PM 6/6/2018 10:47 AM Full Code [id number]  Linda Rachel PA-C Inpatient    1/18/2018  9:31 AM 6/1/2018 10:38 PM Full Code [id number]  Koko Dozier PA-C Outpatient    1/13/2018  2:59 AM 1/17/2018  4:12 PM Full Code [id number]  aPt Mitchell MD Inpatient    9/23/2017  3:16 PM 1/13/2018  2:59 AM Full Code [id number]  Theresa Pink MD Outpatient    9/19/2017 10:55 PM 9/23/2017  3:16 PM Full Code [id number]  Kai Parry MD Inpatient    6/29/2017  4:29 AM 6/30/2017 12:17 PM Full Code [id number]  Kiera Santana MD Inpatient    2/15/2017  4:40 PM 2/16/2017  3:20 PM Full Code  [id number]  Fernandez Mann PA-C Inpatient    2/15/2017  2:43 PM 2/15/2017  4:40 PM Full Code [id number]  Fernandez Mann PA-C Outpatient    10/28/2016 11:13 AM 2/15/2017  2:43 PM Full Code [id number]  Marko Betancourt MD Outpatient    10/25/2016  7:37 PM 10/28/2016 11:13 AM Full Code During Procedure [id number]  Jefferson See MD Inpatient    4/15/2016  1:36 PM 10/25/2016  7:37 PM Full Code [id number]  Blas Edwards MD Outpatient    4/11/2016  8:54 PM 4/15/2016  1:36 PM Full Code [id number]  Leana Jiménez PA Inpatient    4/1/2016 10:33 AM 4/11/2016  8:54 PM Full Code [id number]  Annel Biswas PA-C Outpatient    3/29/2016  3:02 AM 4/1/2016 10:33 AM Full Code [id number]  Shannon Puga MD Inpatient    1/17/2016  1:38 PM 3/29/2016  3:02 AM Full Code [id number]  Ilsa Shine PA Outpatient    1/16/2016  4:34 AM 1/17/2016  1:38 PM Full Code [id number]  Tino Brown MD Inpatient    8/7/2015  3:52 PM 1/16/2016  4:34 AM Full Code [id number]  Jamil Rios MD Outpatient    8/5/2015  6:39 AM 8/7/2015  3:52 PM Full Code [id number]  Eve Monzon MD Inpatient    8/5/2015  4:50 AM 8/5/2015  6:39 AM Full Code [id number]  Eve Monzon MD Inpatient    7/30/2015  1:24 AM 8/4/2015  4:32 PM Full Code [id number]  Eev Monzon MD Inpatient    11/22/2013  8:38 PM 11/27/2013 12:57 PM Full Code [id number]  Nicolette Barrios RN Inpatient    7/15/2012  6:24 AM 7/19/2012  3:16 PM Full Code [id number]  Elaine Kinney MD Inpatient    6/28/2012 12:13 PM 7/9/2012 12:54 PM Full Code [id number]  Elaine Kinney MD ED    6/22/2012  8:30 PM 6/27/2012  3:38 PM Full Code [id number]  Jacki Summers RN Inpatient         Medication Review      START taking        Dose / Directions Comments    cefTRIAXone 2 GM vial   Commonly known as:  ROCEPHIN   Indication:  Bacteria in the Blood   Used for:  Recurrent bacteremia        Dose:  2 g   Inject 2 g into the vein every 24 hours   Quantity:  14  each   Refills:  0          CONTINUE these medications which may have CHANGED, or have new prescriptions. If we are uncertain of the size of tablets/capsules you have at home, strength may be listed as something that might have changed.        Dose / Directions Comments    sucralfate 1 GM/10ML suspension   Commonly known as:  CARAFATE   This may have changed:    - when to take this  - reasons to take this   Used for:  Nausea        Dose:  1 g   Take 10 mLs (1 g) by mouth 4 times daily   Quantity:  1200 mL   Refills:  11        vitamin D 02672 UNIT capsule   Commonly known as:  ERGOCALCIFEROL   This may have changed:  additional instructions   Used for:  Vitamin D deficiency        Dose:  45172 Units   Take 1 capsule (50,000 Units) by mouth every 7 days   Quantity:  12 capsule   Refills:  3          CONTINUE these medications which have NOT CHANGED        Dose / Directions Comments    albuterol 108 (90 Base) MCG/ACT inhaler   Commonly known as:  PROAIR HFA/PROVENTIL HFA/VENTOLIN HFA   Used for:  Productive cough        Dose:  2 puff   Inhale 2 puffs into the lungs every 4 hours as needed for shortness of breath / dyspnea or wheezing   Quantity:  1 Inhaler   Refills:  3        amphetamine-dextroamphetamine 20 MG per tablet   Commonly known as:  ADDERALL   Used for:  ADHD (attention deficit hyperactivity disorder), inattentive type        Dose:  20 mg   Take 1 tablet (20 mg) by mouth daily   Quantity:  30 tablet   Refills:  0        carvedilol 6.25 MG tablet   Commonly known as:  COREG        Dose:  6.25 mg   Take 6.25 mg by mouth 2 times daily (with meals)   Refills:  0        cyanocobalamin 1000 MCG/ML injection   Commonly known as:  VITAMIN B12        Dose:  1 mL   Inject 1 mL (1,000 mcg) into the muscle every 30 days   Quantity:  1 mL   Refills:  11        [facility]NAGED PATIENT   Used for:  S/P bariatric surgery        Contact [facility] for patient specific medication information at  "[phone] on admission and discharge from the hospital.  Phones are answered 24 hours a day 7 days a week 365 days a year.  Providers - Choose \"CONTINUE HOME MED (no script)\" at discharge if patient treatment with home infusion will continue.   Refills:  0    Resume prior to admission TPN/Lipids/saline       fentaNYL 25 mcg/hr 72 hr patch   Commonly known as:  DURAGESIC   Used for:  Chronic abdominal pain, Encounter for long-term opiate analgesic use        Dose:  1 patch   Place 1 patch onto the skin every 48 hours remove old patch.  Release on/after 10/6/18 to start on/after 10/8/18.   Quantity:  15 patch   Refills:  0        lidocaine 5 % Patch   Commonly known as:  LIDODERM   Used for:  Chronic bilateral low back pain without sciatica, Chronic abdominal pain, Myofascial pain        Dose:  1-2 patch   Place 1-2 patches onto the skin every 24 hours Wear for 12 hours, remove for 12 hours.  OK to cut to better fit to size.   Quantity:  60 patch   Refills:  6        lidocaine-prilocaine cream   Commonly known as:  EMLA   Used for:  Pain        Apply topically as needed for moderate pain   Quantity:  30 g   Refills:  3        LORazepam 1 MG tablet   Commonly known as:  ATIVAN   Used for:  Anxiety        Dose:  1 mg   Take 1 tablet (1 mg) by mouth nightly as needed for anxiety or other (sleep)   Quantity:  30 tablet   Refills:  0        Needle (Disp) 27G X 1/2\" Misc   Commonly known as:  BD DISP NEEDLES   Used for:  Bariatric surgery status        Dose:  1 Device   1 Device every 30 days Use for cyanocobalamin injection once q 30 days.   Quantity:  3 each   Refills:  4        ondansetron 8 MG ODT tab   Commonly known as:  ZOFRAN-ODT   Used for:  Nausea        Dose:  8 mg   Take 1 tablet (8 mg) by mouth every 8 hours as needed for nausea   Quantity:  90 tablet   Refills:  3        * order for DME   Used for:  Bariatric surgery status        Injection Supplies for Vitamin B12: 3cc syringes w/ 27 gauge needles, 1/2 " inch length   Quantity:  12 each   Refills:  0        * order for DME   Used for:  Bilateral edema of lower extremity        Equipment being ordered: Bilateral knee high chronic venous insufficiency stockings--  mild-moderate pressures.   Quantity:  4 each   Refills:  5        oxyCODONE 5 MG/5ML solution   Commonly known as:  ROXICODONE   Used for:  Encounter for long-term opiate analgesic use, Chronic abdominal pain        Dose:  10-15 mg   Take 10-15 mLs (10-15 mg) by mouth every 4 hours as needed for moderate to severe pain Max of 45 mg/day this month. Weaning dose as discussed. Fill on/after 10/6/18 not to start untill 10/8/18.   Quantity:  1350 mL   Refills:  0        parenteral nutrition - PTA/DISCHARGE ORDER        TPN+lipid Formula per [facility] 10/10/18: volume 1800 mL; AA 95g/d; Dex 235g/d; Intralipid 20% 350 mL; sodium 30 mEq/d; potassium 100 mEq/d; magnesium 16 mEq/d; phosphate 50 mMol/d; infuvite adult 10 mL/d; MTE-5 3mL/d; Cl:Ace 1:2; Infuse intravenously over 14 hours Monday-Friday.  Plain TPN Formula per [facility] 10/10/18: volume 2150 mL; all other ingredients identical to lipid formula. Infuse intravenously over 14 hours Saturday and Sunday.   Refills:  0        sodium chloride 0.9 % infusion        Dose:  0957-5184 mL   Inject 1,000-2,000 mLs into the vein as needed   Refills:  0        * Notice:  This list has 2 medication(s) that are the same as other medications prescribed for you. Read the directions carefully, and ask your doctor or other care provider to review them with you.            Summary of Visit     Reason for your hospital stay       You were hospitalized for fevers and body aches and were found to have another blood stream infection related to your port. You were given IV antibiotics in an effort to salvage your port. Unfortunately, bacteria continued to grow through the Cm catheter and it had to be removed. A PICC line was placed and you were able to discharge to home.             After  Care     Activity       Your activity upon discharge: activity as tolerated       Diet       Follow this diet upon discharge: Orders Placed This Encounter      Regular Diet Adult             Referrals     Home care nursing referral       [facility]   [phone]  Fax [phone]    RN skilled nursing visit. RN to assess vital signs and weight, respiratory and cardiac status, pain level and activity tolerance, hydration, nutrition and bowel status and home safety.  Port line cares    Your provider has ordered home care nursing services. If you have not been contacted within 2 days of your discharge please call the inpatient department phone number at [phone] .       Home infusion referral       Your provider has referred you to: [facility]: [facility] [phone]   [email or web address]    Local Address (if different from home address): N/A    Anticipated Length of Therapy: per MD order    Home Infusion Pharmacist to adjust therapy based on labs and clinical assessments: Yes    Labs:  May draw labs from Venous Catheter: Yes  Home Infusion Pharmacist to order labs based on therapy type and clinical assessments: Yes  Call/Fax Lab Results to: pcp    Agency Staff to assess nursing needs for Infusion Therapy.    Access Device Management:  IV Access Type: PICC  Flush with Heparin and Normal Saline IVP PRN and routine site care (per agency protocol) to maintain access device? Yes    TPN and supplies  IV abx and supplies  Please resume home IV hydration per previous order. Two liters normal saline per day as needed.             Your next 10 appointments already scheduled     Oct 25, 2018  2:45 PM CDT   Office Visit with Omar Wren MD   [facility] ([facility])    [address] [place] [id number] [phone]           Bring a current list of meds and any records pertaining to this visit. For  Physicals, please bring immunization records and any forms needing to be filled out. Please arrive 10 minutes early to complete paperwork.            Oct 29, 2018 10:30 AM CDT   Return Visit with Patti Milligan MD   [facility] ([facility])    [address]  [place] [address]   [phone]              Follow-Up Appointment Instructions     Future Labs/Procedures    Adult [facility] Follow-up and recommended labs and tests     Comments:    Follow up with primary care provider, Esteban Daly, in 2 weeks, for hospital follow- up. The following labs/tests are recommended: Blood Cultures.   Follow up with Infectious Disease in 2-4 weeks.     Appointments on [place] and/or [facility] (with [facility] or [facility] provider or service). Call [phone] if you haven't heard regarding these appointments within 7 days of discharge.      Follow-Up Appointment Instructions     Adult [facility] Follow-up and recommended labs and tests       Follow up with primary care provider, Esteban Daly, in 2 weeks, for hospital follow- up. The following labs/tests are recommended: Blood Cultures.   Follow up with Infectious Disease in 2-4 weeks.     Appointments on [place] and/or [facility] (with [facility] or [facility] provider or service). Call [phone] if you haven't heard regarding these appointments within 7 days of discharge.             Statement of Approval     Ordered          10/14/18 1057  I have reviewed and agree with all the recommendations and orders detailed in this document.  EFFECTIVE NOW     Approved and electronically signed by:  Álvaro Dickerson MD

## 2018-10-09 NOTE — IP AVS SNAPSHOT
[address] [address]    Phone:  [phone]                                       After Visit Summary   10/9/2018    Parker Acevedo Sr    MRN: [id number]           After Visit Summary Signature Page     I have received my discharge instructions, and my questions have been answered. I have discussed any challenges I see with this plan with the nurse or doctor.    ..........................................................................................................................................  Patient/Patient Representative Signature      ..........................................................................................................................................  Patient Representative Print Name and Relationship to Patient    ..................................................               ................................................  Date                                   Time    ..........................................................................................................................................  Reviewed by Signature/Title    ...................................................              ..............................................  Date                                               Time          22EPIC Rev 08/18

## 2018-10-09 NOTE — TELEPHONE ENCOUNTER
Patient being contacted for the early positive blood culture with gram positive cocci noted in pairs and chains. RN staff is working on contacting by phone and My Chart.     Plans are for him to go to the [facility] ER for evaluation and likely admission with ID consultation. Has a deep line in place for TPN.    Esteban Daly MD

## 2018-10-09 NOTE — PROGRESS NOTES
This is a recent snapshot of the patient's [facility] medical record.  For current drug dose and complete information and questions, call [phone] or In Basket pool, [facility] home infusion ([id number])  CSN Number:  [id number]

## 2018-10-09 NOTE — LETTER
Transition Communication Hand-off for Care Transitions to Next Level of Care Provider    Name: Parker Acevedo Sr  : 1970  MRN #: [id number]  Primary Care Provider: Esteban Daly     Primary Clinic: [id number] [place] [id number]     Reason for Hospitalization:  Other chronic pain [G89.29]  Recurrent bacteremia [R78.81]  On total parenteral nutrition [Z78.9]  Admit Date/Time: 10/9/2018 10:21 PM  Discharge Date: 10/14/2018  Payor Source: Payor: [employer] / Plan: [employer] MEDICARE ADVANTAGE / Product Type: Medicare /     Discharge Plan:  Patient discharged with daily IV antibiotic and resumption of TPN through [facility].      Discharge Needs Assessment:  Needs       Most Recent Value    Home Care [facility] [phone], Fax: [phone]    Home Infusion Provider [facility] [phone], Fax: [phone]        Follow-up plan:  Future Appointments  Date Time Provider Department Center   10/25/2018 2:45 PM Omar Wren MD [facility]   10/29/2018 10:30 AM Patti Milligan MD [facility]       Any outstanding tests or procedures:        Referrals     Future Labs/Procedures    Home care nursing referral     Comments:    [facility] [phone]  Fax [phone]    RN skilled nursing visit. RN to assess vital signs and weight, respiratory and cardiac status, pain level and activity tolerance, hydration, nutrition and bowel status and home safety.  Port line cares    Your provider has ordered home care nursing services. If you have not been contacted within 2 days of your discharge please call the inpatient department phone number at [phone] .    Home infusion referral     Comments:    Your provider has referred you to: [facility]: [facility] [place] [phone]   [email or web address]    Local Address (if different from home address): N/A    Anticipated Length of  Therapy: per MD order    Home Infusion Pharmacist to adjust therapy based on labs and clinical assessments: Yes    Labs:  May draw labs from Venous Catheter: Yes  Home Infusion Pharmacist to order labs based on therapy type and clinical assessments: Yes  Call/Fax Lab Results to: pcp    Agency Staff to assess nursing needs for Infusion Therapy.    Access Device Management:  IV Access Type: Port-a-Cath  Flush with Heparin and Normal Saline IVP PRN and routine site care (per agency protocol) to maintain access device? Yes    TPN and supplies  IV abx and supplies            GERRI Castaneda    AVS/Discharge Summary is the source of truth; this is a helpful guide for improved communication of patient story

## 2018-10-10 ENCOUNTER — PATIENT OUTREACH (OUTPATIENT)
Dept: CARE COORDINATION | Facility: CLINIC | Age: 48
End: 2018-10-10

## 2018-10-10 ENCOUNTER — HOME INFUSION (PRE-WILLOW HOME INFUSION) (OUTPATIENT)
Dept: PHARMACY | Facility: CLINIC | Age: 48
End: 2018-10-10

## 2018-10-10 LAB
ALBUMIN SERPL-MCNC: 3.2 G/DL (ref 3.4–5)
ALBUMIN UR-MCNC: NEGATIVE MG/DL
ALP SERPL-CCNC: 79 U/L (ref 40–150)
ALT SERPL W P-5'-P-CCNC: 21 U/L (ref 0–70)
ANION GAP SERPL CALCULATED.3IONS-SCNC: 9 MMOL/L (ref 3–14)
APPEARANCE UR: CLEAR
AST SERPL W P-5'-P-CCNC: 15 U/L (ref 0–45)
BASOPHILS # BLD AUTO: 0 10E9/L (ref 0–0.2)
BASOPHILS NFR BLD AUTO: 0.7 %
BILIRUB SERPL-MCNC: 0.3 MG/DL (ref 0.2–1.3)
BILIRUB UR QL STRIP: NEGATIVE
BUN SERPL-MCNC: 14 MG/DL (ref 7–30)
CALCIUM SERPL-MCNC: 8.1 MG/DL (ref 8.5–10.1)
CHLORIDE SERPL-SCNC: 106 MMOL/L (ref 94–109)
CO2 SERPL-SCNC: 29 MMOL/L (ref 20–32)
COLOR UR AUTO: YELLOW
CREAT SERPL-MCNC: 0.76 MG/DL (ref 0.66–1.25)
DIFFERENTIAL METHOD BLD: ABNORMAL
EOSINOPHIL # BLD AUTO: 0.3 10E9/L (ref 0–0.7)
EOSINOPHIL NFR BLD AUTO: 5.6 %
ERYTHROCYTE [DISTWIDTH] IN BLOOD BY AUTOMATED COUNT: 13.5 % (ref 10–15)
GFR SERPL CREATININE-BSD FRML MDRD: >90 ML/MIN/1.7M2
GLUCOSE SERPL-MCNC: 102 MG/DL (ref 70–99)
GLUCOSE UR STRIP-MCNC: NEGATIVE MG/DL
HCT VFR BLD AUTO: 37.3 % (ref 40–53)
HGB BLD-MCNC: 12 G/DL (ref 13.3–17.7)
HGB UR QL STRIP: ABNORMAL
IMM GRANULOCYTES # BLD: 0 10E9/L (ref 0–0.4)
IMM GRANULOCYTES NFR BLD: 0 %
KETONES UR STRIP-MCNC: NEGATIVE MG/DL
LACTATE BLD-SCNC: 0.7 MMOL/L (ref 0.7–2)
LEUKOCYTE ESTERASE UR QL STRIP: NEGATIVE
LYMPHOCYTES # BLD AUTO: 1.7 10E9/L (ref 0.8–5.3)
LYMPHOCYTES NFR BLD AUTO: 30.3 %
MCH RBC QN AUTO: 31 PG (ref 26.5–33)
MCHC RBC AUTO-ENTMCNC: 32.2 G/DL (ref 31.5–36.5)
MCV RBC AUTO: 96 FL (ref 78–100)
MONOCYTES # BLD AUTO: 1.3 10E9/L (ref 0–1.3)
MONOCYTES NFR BLD AUTO: 23.5 %
NEUTROPHILS # BLD AUTO: 2.2 10E9/L (ref 1.6–8.3)
NEUTROPHILS NFR BLD AUTO: 39.9 %
NITRATE UR QL: NEGATIVE
NRBC # BLD AUTO: 0 10*3/UL
NRBC BLD AUTO-RTO: 0 /100
PH UR STRIP: 7 PH (ref 5–7)
PLATELET # BLD AUTO: 103 10E9/L (ref 150–450)
PLATELET # BLD EST: ABNORMAL 10*3/UL
POTASSIUM SERPL-SCNC: 3.6 MMOL/L (ref 3.4–5.3)
PROT SERPL-MCNC: 6.6 G/DL (ref 6.8–8.8)
RBC # BLD AUTO: 3.87 10E12/L (ref 4.4–5.9)
RBC #/AREA URNS AUTO: 16 /HPF (ref 0–2)
SODIUM SERPL-SCNC: 143 MMOL/L (ref 133–144)
SOURCE: ABNORMAL
SP GR UR STRIP: 1.01 (ref 1–1.03)
SQUAMOUS #/AREA URNS AUTO: <1 /HPF (ref 0–1)
UROBILINOGEN UR STRIP-MCNC: NORMAL MG/DL (ref 0–2)
WBC # BLD AUTO: 5.5 10E9/L (ref 4–11)
WBC #/AREA URNS AUTO: 1 /HPF (ref 0–5)

## 2018-10-10 PROCEDURE — 25000132 ZZH RX MED GY IP 250 OP 250 PS 637: Performed by: EMERGENCY MEDICINE

## 2018-10-10 PROCEDURE — 87040 BLOOD CULTURE FOR BACTERIA: CPT | Performed by: EMERGENCY MEDICINE

## 2018-10-10 PROCEDURE — 87077 CULTURE AEROBIC IDENTIFY: CPT | Performed by: EMERGENCY MEDICINE

## 2018-10-10 PROCEDURE — 81001 URINALYSIS AUTO W/SCOPE: CPT | Performed by: EMERGENCY MEDICINE

## 2018-10-10 PROCEDURE — 25000131 ZZH RX MED GY IP 250 OP 636 PS 637: Performed by: STUDENT IN AN ORGANIZED HEALTH CARE EDUCATION/TRAINING PROGRAM

## 2018-10-10 PROCEDURE — 80053 COMPREHEN METABOLIC PANEL: CPT | Performed by: EMERGENCY MEDICINE

## 2018-10-10 PROCEDURE — 25000128 H RX IP 250 OP 636: Performed by: EMERGENCY MEDICINE

## 2018-10-10 PROCEDURE — 83605 ASSAY OF LACTIC ACID: CPT | Performed by: EMERGENCY MEDICINE

## 2018-10-10 PROCEDURE — 96376 TX/PRO/DX INJ SAME DRUG ADON: CPT | Performed by: EMERGENCY MEDICINE

## 2018-10-10 PROCEDURE — 12000001 ZZH R&B MED SURG/OB UMMC

## 2018-10-10 PROCEDURE — 96366 THER/PROPH/DIAG IV INF ADDON: CPT | Performed by: EMERGENCY MEDICINE

## 2018-10-10 PROCEDURE — 96375 TX/PRO/DX INJ NEW DRUG ADDON: CPT | Performed by: EMERGENCY MEDICINE

## 2018-10-10 PROCEDURE — 25000132 ZZH RX MED GY IP 250 OP 250 PS 637: Performed by: INTERNAL MEDICINE

## 2018-10-10 PROCEDURE — 25000128 H RX IP 250 OP 636: Performed by: INTERNAL MEDICINE

## 2018-10-10 PROCEDURE — 96365 THER/PROPH/DIAG IV INF INIT: CPT | Performed by: EMERGENCY MEDICINE

## 2018-10-10 PROCEDURE — 25000125 ZZHC RX 250: Performed by: INTERNAL MEDICINE

## 2018-10-10 PROCEDURE — 85025 COMPLETE CBC W/AUTO DIFF WBC: CPT | Performed by: EMERGENCY MEDICINE

## 2018-10-10 PROCEDURE — 99223 1ST HOSP IP/OBS HIGH 75: CPT | Mod: AI | Performed by: INTERNAL MEDICINE

## 2018-10-10 PROCEDURE — 96367 TX/PROPH/DG ADDL SEQ IV INF: CPT | Performed by: EMERGENCY MEDICINE

## 2018-10-10 PROCEDURE — 25000132 ZZH RX MED GY IP 250 OP 250 PS 637: Performed by: STUDENT IN AN ORGANIZED HEALTH CARE EDUCATION/TRAINING PROGRAM

## 2018-10-10 RX ORDER — DIPHENHYDRAMINE HYDROCHLORIDE 50 MG/ML
25 INJECTION INTRAMUSCULAR; INTRAVENOUS ONCE
Status: COMPLETED | OUTPATIENT
Start: 2018-10-10 | End: 2018-10-10

## 2018-10-10 RX ORDER — DIPHENHYDRAMINE HCL 25 MG
25 CAPSULE ORAL EVERY 6 HOURS PRN
Status: DISCONTINUED | OUTPATIENT
Start: 2018-10-10 | End: 2018-10-14 | Stop reason: HOSPADM

## 2018-10-10 RX ORDER — ERGOCALCIFEROL 1.25 MG/1
50000 CAPSULE, LIQUID FILLED ORAL
Status: DISCONTINUED | OUTPATIENT
Start: 2018-10-10 | End: 2018-10-10

## 2018-10-10 RX ORDER — CYANOCOBALAMIN 1000 UG/ML
1000 INJECTION, SOLUTION INTRAMUSCULAR; SUBCUTANEOUS
Status: DISCONTINUED | OUTPATIENT
Start: 2018-11-02 | End: 2018-10-14 | Stop reason: HOSPADM

## 2018-10-10 RX ORDER — LIDOCAINE 4 G/G
1-2 PATCH TOPICAL
Status: DISCONTINUED | OUTPATIENT
Start: 2018-10-10 | End: 2018-10-14 | Stop reason: HOSPADM

## 2018-10-10 RX ORDER — ERGOCALCIFEROL 1.25 MG/1
50000 CAPSULE, LIQUID FILLED ORAL
Status: DISCONTINUED | OUTPATIENT
Start: 2018-10-14 | End: 2018-10-14 | Stop reason: HOSPADM

## 2018-10-10 RX ORDER — SODIUM CHLORIDE 9 MG/ML
INJECTION, SOLUTION INTRAVENOUS PRN
COMMUNITY
End: 2020-08-31

## 2018-10-10 RX ORDER — CARVEDILOL 12.5 MG/1
12.5 TABLET ORAL 2 TIMES DAILY WITH MEALS
Status: DISCONTINUED | OUTPATIENT
Start: 2018-10-10 | End: 2018-10-10

## 2018-10-10 RX ORDER — DEXTROAMPHETAMINE SACCHARATE, AMPHETAMINE ASPARTATE, DEXTROAMPHETAMINE SULFATE AND AMPHETAMINE SULFATE 2.5; 2.5; 2.5; 2.5 MG/1; MG/1; MG/1; MG/1
20 TABLET ORAL DAILY
Status: DISCONTINUED | OUTPATIENT
Start: 2018-10-10 | End: 2018-10-14 | Stop reason: HOSPADM

## 2018-10-10 RX ORDER — LORAZEPAM 0.5 MG/1
1 TABLET ORAL
Status: DISCONTINUED | OUTPATIENT
Start: 2018-10-10 | End: 2018-10-14 | Stop reason: HOSPADM

## 2018-10-10 RX ORDER — CARVEDILOL 6.25 MG/1
12.5 TABLET ORAL 2 TIMES DAILY WITH MEALS
Status: ON HOLD | COMMUNITY
End: 2022-07-13

## 2018-10-10 RX ORDER — NALOXONE HYDROCHLORIDE 0.4 MG/ML
.1-.4 INJECTION, SOLUTION INTRAMUSCULAR; INTRAVENOUS; SUBCUTANEOUS
Status: DISCONTINUED | OUTPATIENT
Start: 2018-10-10 | End: 2018-10-14 | Stop reason: HOSPADM

## 2018-10-10 RX ORDER — ACETAMINOPHEN 500 MG
1000 TABLET ORAL EVERY 6 HOURS PRN
Status: DISCONTINUED | OUTPATIENT
Start: 2018-10-10 | End: 2018-10-11

## 2018-10-10 RX ORDER — CARVEDILOL 6.25 MG/1
6.25 TABLET ORAL 2 TIMES DAILY WITH MEALS
Status: DISCONTINUED | OUTPATIENT
Start: 2018-10-10 | End: 2018-10-14 | Stop reason: HOSPADM

## 2018-10-10 RX ORDER — CYANOCOBALAMIN 1000 UG/ML
1000 INJECTION, SOLUTION INTRAMUSCULAR; SUBCUTANEOUS
Status: DISCONTINUED | OUTPATIENT
Start: 2018-11-02 | End: 2018-10-10

## 2018-10-10 RX ORDER — OXYCODONE HCL 5 MG/5 ML
15 SOLUTION, ORAL ORAL ONCE
Status: COMPLETED | OUTPATIENT
Start: 2018-10-10 | End: 2018-10-10

## 2018-10-10 RX ORDER — SUCRALFATE ORAL 1 G/10ML
1 SUSPENSION ORAL EVERY 4 HOURS PRN
Status: DISCONTINUED | OUTPATIENT
Start: 2018-10-10 | End: 2018-10-14 | Stop reason: HOSPADM

## 2018-10-10 RX ORDER — CEFTRIAXONE 2 G/1
2 INJECTION, POWDER, FOR SOLUTION INTRAMUSCULAR; INTRAVENOUS EVERY 24 HOURS
Status: DISCONTINUED | OUTPATIENT
Start: 2018-10-11 | End: 2018-10-14 | Stop reason: HOSPADM

## 2018-10-10 RX ORDER — CYANOCOBALAMIN 1000 UG/ML
1000 INJECTION, SOLUTION INTRAMUSCULAR; SUBCUTANEOUS
Status: DISCONTINUED | OUTPATIENT
Start: 2018-11-23 | End: 2018-10-10

## 2018-10-10 RX ORDER — OXYCODONE HCL 5 MG/5 ML
10-15 SOLUTION, ORAL ORAL EVERY 4 HOURS PRN
Status: DISCONTINUED | OUTPATIENT
Start: 2018-10-10 | End: 2018-10-14 | Stop reason: HOSPADM

## 2018-10-10 RX ORDER — FENTANYL 25 UG/1
25 PATCH TRANSDERMAL
Status: DISCONTINUED | OUTPATIENT
Start: 2018-10-10 | End: 2018-10-14 | Stop reason: HOSPADM

## 2018-10-10 RX ORDER — ONDANSETRON 4 MG/1
8 TABLET, ORALLY DISINTEGRATING ORAL EVERY 8 HOURS PRN
Status: DISCONTINUED | OUTPATIENT
Start: 2018-10-10 | End: 2018-10-14 | Stop reason: HOSPADM

## 2018-10-10 RX ORDER — DIPHENHYDRAMINE HYDROCHLORIDE 50 MG/ML
25 INJECTION INTRAMUSCULAR; INTRAVENOUS EVERY 6 HOURS PRN
Status: DISCONTINUED | OUTPATIENT
Start: 2018-10-10 | End: 2018-10-14 | Stop reason: HOSPADM

## 2018-10-10 RX ORDER — CEFTRIAXONE 1 G/1
1 INJECTION, POWDER, FOR SOLUTION INTRAMUSCULAR; INTRAVENOUS EVERY 24 HOURS
Status: DISCONTINUED | OUTPATIENT
Start: 2018-10-10 | End: 2018-10-10

## 2018-10-10 RX ORDER — ALBUTEROL SULFATE 90 UG/1
2 AEROSOL, METERED RESPIRATORY (INHALATION) EVERY 4 HOURS PRN
Status: DISCONTINUED | OUTPATIENT
Start: 2018-10-10 | End: 2018-10-14 | Stop reason: HOSPADM

## 2018-10-10 RX ORDER — ONDANSETRON 2 MG/ML
4 INJECTION INTRAMUSCULAR; INTRAVENOUS ONCE
Status: COMPLETED | OUTPATIENT
Start: 2018-10-10 | End: 2018-10-10

## 2018-10-10 RX ADMIN — LORAZEPAM 1 MG: 0.5 TABLET ORAL at 21:04

## 2018-10-10 RX ADMIN — VANCOMYCIN HYDROCHLORIDE 2000 MG: 1 INJECTION, POWDER, LYOPHILIZED, FOR SOLUTION INTRAVENOUS at 14:12

## 2018-10-10 RX ADMIN — ONDANSETRON 4 MG: 2 INJECTION INTRAMUSCULAR; INTRAVENOUS at 01:59

## 2018-10-10 RX ADMIN — CARVEDILOL 6.25 MG: 6.25 TABLET, FILM COATED ORAL at 18:57

## 2018-10-10 RX ADMIN — VANCOMYCIN HYDROCHLORIDE 2000 MG: 1 INJECTION, POWDER, LYOPHILIZED, FOR SOLUTION INTRAVENOUS at 02:50

## 2018-10-10 RX ADMIN — CEFTRIAXONE 1 G: 1 INJECTION, POWDER, FOR SOLUTION INTRAMUSCULAR; INTRAVENOUS at 12:54

## 2018-10-10 RX ADMIN — OXYCODONE HYDROCHLORIDE 10 MG: 5 SOLUTION ORAL at 15:00

## 2018-10-10 RX ADMIN — DIPHENHYDRAMINE HYDROCHLORIDE 25 MG: 50 INJECTION, SOLUTION INTRAMUSCULAR; INTRAVENOUS at 00:55

## 2018-10-10 RX ADMIN — OXYCODONE HYDROCHLORIDE 15 MG: 5 SOLUTION ORAL at 09:16

## 2018-10-10 RX ADMIN — FENTANYL 1 PATCH: 25 PATCH, EXTENDED RELEASE TRANSDERMAL at 09:16

## 2018-10-10 RX ADMIN — LIDOCAINE HYDROCHLORIDE 30 ML: 20 SOLUTION ORAL; TOPICAL at 09:54

## 2018-10-10 RX ADMIN — OXYCODONE HYDROCHLORIDE 10 MG: 5 SOLUTION ORAL at 19:06

## 2018-10-10 RX ADMIN — DIPHENHYDRAMINE HYDROCHLORIDE 25 MG: 50 INJECTION, SOLUTION INTRAMUSCULAR; INTRAVENOUS at 14:10

## 2018-10-10 RX ADMIN — CARVEDILOL 12.5 MG: 12.5 TABLET, FILM COATED ORAL at 09:16

## 2018-10-10 RX ADMIN — OXYCODONE HYDROCHLORIDE 15 MG: 5 SOLUTION ORAL at 01:58

## 2018-10-10 RX ADMIN — METRONIDAZOLE 500 MG: 500 INJECTION, SOLUTION INTRAVENOUS at 18:57

## 2018-10-10 RX ADMIN — OXYCODONE HYDROCHLORIDE 10 MG: 5 SOLUTION ORAL at 23:08

## 2018-10-10 RX ADMIN — DEXTROAMPHETAMINE SACCHARATE, AMPHETAMINE ASPARTATE, DEXTROAMPHETAMINE SULFATE AND AMPHETAMINE SULFATE 20 MG: 2.5; 2.5; 2.5; 2.5 TABLET ORAL at 09:16

## 2018-10-10 RX ADMIN — DIPHENHYDRAMINE HYDROCHLORIDE 25 MG: 50 INJECTION, SOLUTION INTRAMUSCULAR; INTRAVENOUS at 02:35

## 2018-10-10 RX ADMIN — ONDANSETRON 8 MG: 4 TABLET, ORALLY DISINTEGRATING ORAL at 23:08

## 2018-10-10 ASSESSMENT — ENCOUNTER SYMPTOMS
DYSURIA: 1
ARTHRALGIAS: 1
BLOOD IN STOOL: 1
MYALGIAS: 1
HEMATURIA: 1
HEADACHES: 0
DIARRHEA: 1
NAUSEA: 1
VOMITING: 1
CHILLS: 0

## 2018-10-10 ASSESSMENT — PAIN DESCRIPTION - DESCRIPTORS: DESCRIPTORS: ACHING;CONSTANT

## 2018-10-10 ASSESSMENT — ACTIVITIES OF DAILY LIVING (ADL): ADLS_ACUITY_SCORE: 12

## 2018-10-10 NOTE — ED TRIAGE NOTES
Patient presents ambulatory to triage with c/o blood infection. Patient states he developed a fever on Sunday, temp max 102.2. Temp 98.4 during triage. Had labs done yesterday and received call today stating blood cultures were positive and he should present to ED.

## 2018-10-10 NOTE — ED PROVIDER NOTES
History     Chief Complaint   Patient presents with     Blood Infection     The history is provided by the patient.     Parker Acevedo Sr is a 47 year old male with a history of cardiomyopathy, chronic abdominal pain, GERD, hiatal hernia, malnutrition on TPN s/p Celestino-en-Y gastric bypass (2002) and recurrent episodes of bacteremia/central line infections who presents to the Emergency Department after being directed to do so by his clinic due to positive blood cultures.  Patient states he had 2 blood cultures on Monday afternoon, 2 days ago.  This evening the patient received a call stating that he had positive blood cultures and was instructed to seek medical attention.   5 days ago patient reports he got a flu shot.  Yesterday the patient reports a fever of 102.6  F and denies taking anything for his fever.  The fever was associated with arthralgia, myalgia, sneezing which brings about a rash.  Patient reports experiencing chronic abdominal pain as well as chronic vomiting and diarrhea which remains unchanged.  Patient complains of dysuria, hematuria, diarrhea as well as occasional blood in his stool. Patient denies headaches or chills.  He does reports that he has a G-tube which he uses to vent due to his chronic abdominal pain.  It is not uncommon for him to have bloody drainage from his G-tube which he attributes to chronic ulcers.    He has previously had multiple central lines, his most recent Cm was placed on August 2 by interventional radiology per chart review.    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)      Chronic abdominal pain      Complication of anesthesia      Difficulty swallowing      Gastric ulcer, unspecified as acute or chronic, without mention of hemorrhage,  perforation, or obstruction      Gastro-oesophageal reflux disease      Generalized weakness 1/30/2018     Head injury      Hiatal hernia      Other bladder disorder      Other chronic pain      PONV (postoperative nausea and vomiting)      Severe malnutrition (H)     TPN     Short gut syndrome      Tobacco abuse        Past Surgical History:   Procedure Laterality Date     AMPUTATION       APPENDECTOMY       BACK SURGERY  11/3/2014    curve in the spine     BIOPSY LYMPH NODE CERVICAL N/A 2/20/2015    Procedure: BIOPSY LYMPH NODE CERVICAL;  Surgeon: Baron Scanlon MD;  Location: [facility]     C GASTRIC BYPASS,OBESE<100CM SHAYLEE-EN-Y  2002    lost 300 pounds     CHOLECYSTECTOMY       DISCECTOMY, FUSION CERVICAL ANTERIOR ONE LEVEL, COMBINED N/A 2/15/2017    Procedure: COMBINED DISCECTOMY, FUSION CERVICAL ANTERIOR ONE LEVEL;  Surgeon: Darren Campos MD;  Location: [facility]     ENDOSCOPIC INSERTION TUBE GASTROSTOMY  9/9/2013    Procedure: ENDOSCOPIC INSERTION TUBE GASTROSTOMY;;  Surgeon: Francis Vyas MD;  [facility]     ENDOSCOPIC ULTRASOUND UPPER GASTROINTESTINAL TRACT (GI)  4/29/2011    Procedure:ENDOSCOPIC ULTRASOUND UPPER GASTROINTESTINAL TRACT (GI); Both Procedures done Conjointly; Surgeon:NEREIDA HOUSER; [facility]     ENDOSCOPIC ULTRASOUND UPPER GASTROINTESTINAL TRACT (GI)  9/9/2013    Procedure: ENDOSCOPIC ULTRASOUND UPPER GASTROINTESTINAL TRACT (GI);  Endoscopic Ultrasound Guide Gastrostomy Tube Placement  C-arm;  Surgeon: Noe Lizarraga MD;  [facility]     ENDOSCOPY  03/25/11    EGD, [facility]     ENDOSCOPY  08/04/09    Upper Endoscopy, [facility]     ENDOSCOPY  01/05/09    Upper Endoscopy, [facility]     ESOPHAGOSCOPY, GASTROSCOPY, DUODENOSCOPY (EGD), COMBINED  4/20/2011    Procedure:COMBINED ESOPHAGOSCOPY, GASTROSCOPY, DUODENOSCOPY (EGD); Surgeon:FRANCIS VYAS; [facility]     ESOPHAGOSCOPY, GASTROSCOPY, DUODENOSCOPY (EGD), COMBINED  6/15/2011     Procedure:COMBINED ESOPHAGOSCOPY, GASTROSCOPY, DUODENOSCOPY (EGD); Surgeon:BLU VYAS; [facility]     ESOPHAGOSCOPY, GASTROSCOPY, DUODENOSCOPY (EGD), COMBINED  6/12/2013    Procedure: COMBINED ESOPHAGOSCOPY, GASTROSCOPY, DUODENOSCOPY (EGD);;  Surgeon: Blu Vyas MD;  [facility]     ESOPHAGOSCOPY, GASTROSCOPY, DUODENOSCOPY (EGD), COMBINED  11/22/2013    Procedure: COMBINED ESOPHAGOSCOPY, GASTROSCOPY, DUODENOSCOPY (EGD);;  Surgeon: Blu Vyas MD;  [facility]     ESOPHAGOSCOPY, GASTROSCOPY, DUODENOSCOPY (EGD), COMBINED  4/30/2014    Procedure: COMBINED ESOPHAGOSCOPY, GASTROSCOPY, DUODENOSCOPY (EGD);  Surgeon: Blu Vyas MD;  Location:  GI     ESOPHAGOSCOPY, GASTROSCOPY, DUODENOSCOPY (EGD), COMBINED N/A 2/20/2015    Procedure: COMBINED ESOPHAGOSCOPY, GASTROSCOPY, DUODENOSCOPY (EGD), BIOPSY SINGLE OR MULTIPLE;  Surgeon: Baron Scanlon MD;  Location:  OR     ESOPHAGOSCOPY, GASTROSCOPY, DUODENOSCOPY (EGD), COMBINED N/A 9/30/2015    Procedure: COMBINED ESOPHAGOSCOPY, GASTROSCOPY, DUODENOSCOPY (EGD);  Surgeon: Blu Vyas MD;  Location:  GI     GASTRECTOMY  6/22/2012    Procedure: GASTRECTOMY;  Open Approach, Excise Ulcers,Partial Gastrectomy, Esophagojejunostomy, Hiatal Hernia Repair, Extensive Lysis of Adhesions and Esaphagogastrodudenoscopy.;  Surgeon: Blu Vyas MD;  [facility]     GASTROJEJUNOSTOMY  08/26/09    Extensice enterolysis, partial resect. jejunum, part. resect gastric pouch, gastrojejunostomy anastomosis     HC ESOPH/GAS REFLUX TEST W NASAL IMPED ELECTRODE  8/5/2013    Procedure: ESOPHAGEAL IMPEDENCE FUNCTION TEST 1 HOUR OR LESS;  Surgeon: Halie Lang MD;  Location:  GI     HEAD & NECK SURGERY  2/15/2017    C5-C6     HERNIA REPAIR  2006    Umbilical hernia     HERNIORRHAPHY HIATAL  6/22/2012    Procedure: HERNIORRHAPHY HIATAL;;  Surgeon: Blu Vyas MD;  [facility]     HERNIORRHAPHY  INGUINAL  11/22/2013    Procedure: HERNIORRHAPHY INGUINAL;;  Surgeon: Francis Vyas MD;  [facility]     INSERT PORT VASCULAR ACCESS Right 12/19/2017    Procedure: INSERT PORT VASCULAR ACCESS;  Right Chest Port Placement ;  Surgeon: Lisandro Alejandro PA-C;  [facility]     INSERT PORT VASCULAR ACCESS Right 8/2/2018    Procedure: INSERT PORT VASCULAR ACCESS;  Place single lumen tunneled central venous access catheter;  Surgeon: Guy Jamil PA-C;  [facility]     LAPAROTOMY EXPLORATORY  11/22/2013    Procedure: LAPAROTOMY EXPLORATORY;  Exploratory Laparotomy, Upper Endoscopy, Left Inguinal Hernia Repair;  Surgeon: Francis Vyas MD;  [facility]     ORTHOPEDIC SURGERY       PICC INSERTION Right 03/16/2017    5fr DL BioFlo PICC, 42cm (3cm external) in the R medial brachial vein w/ tip in the SVC RA junction.     PICC INSERTION Left 09/23/2017    5fr DL BioFlo PICC, 45cm (1cm external) in the L basilic vein w/ tip in the SVC RA junction.     SHAYLEE EN Y BOWEL  2003     SOFT TISSUE SURGERY       SOFT TISSUE SURGERY       THORACIC SURGERY       TONSILLECTOMY         Family History   Problem Relation Age of Onset     GASTROINTESTINAL DISEASE Mother      Crohns disease     Anxiety Disorder Mother      Thyroid Disease Mother      Grave's disease     Cancer Father      ear cancer-skin cancer/melanoma     Breast Cancer Maternal Grandmother      Anxiety Disorder Sister      Diabetes Maternal Uncle      Breast Cancer Other      Hypertension No family hx of      Hyperlipidemia No family hx of      Cerebrovascular Disease No family hx of      Prostate Cancer No family hx of      Depression No family hx of      Anesthesia Reaction No family hx of      Asthma No family hx of      Osteoporosis No family hx of      Genetic Disorder No family hx of      Obesity No family hx of      Mental Illness No family hx of      Substance Abuse No family hx of        Social History   Substance Use  "Topics     Smoking status: Current Some Day Smoker     Packs/day: 0.25     Years: 3.00     Types: Cigarettes     Last attempt to quit: 7/28/2018     Smokeless tobacco: Never Used      Comment: I use an e cig every now and than     Alcohol use No      Comment: quit 2002     Current Facility-Administered Medications   Medication     vancomycin (VANCOCIN) 2,000 mg in sodium chloride 0.9 % 500 mL intermittent infusion     vancomycin (VANCOCIN) 2,000 mg in sodium chloride 0.9 % 500 mL intermittent infusion     Current Outpatient Prescriptions   Medication     acetaminophen (TYLENOL) 500 MG tablet     albuterol (PROAIR HFA/PROVENTIL HFA/VENTOLIN HFA) 108 (90 Base) MCG/ACT Inhaler     amphetamine-dextroamphetamine (ADDERALL) 20 MG per tablet     carvedilol (COREG) 12.5 MG tablet     cyanocobalamin (VITAMIN B12) 1000 MCG/ML injection     [facility] INFUSION MANAGED PATIENT     fentaNYL (DURAGESIC) 25 mcg/hr 72 hr patch     lidocaine (LIDODERM) 5 % Patch     lidocaine-prilocaine (EMLA) cream     LORazepam (ATIVAN) 1 MG tablet     Needle, Disp, (BD DISP NEEDLES) 27G X 1/2\" MISC     ondansetron (ZOFRAN-ODT) 8 MG ODT tab     order for DME     order for DME     oxyCODONE (ROXICODONE) 5 MG/5ML solution     parenteral nutrition - PTA/DISCHARGE ORDER     sucralfate (CARAFATE) 1 GM/10ML suspension     vitamin D (ERGOCALCIFEROL) 18729 UNIT capsule     [DISCONTINUED] NO ACTIVE MEDICATIONS        Allergies   Allergen Reactions     Bactrim [Sulfamethoxazole W/Trimethoprim] Rash     Penicillins Anaphylaxis     Doxycycline Rash     Vancomycin Rash     Rash after receiving vancomycin 3/28/16 (red man's?). Tolerated with slower infusion and diphenhydramine premed.       I have reviewed the Medications, Allergies, Past Medical and Surgical History, and Social History in the Epic system.    Review of Systems   Constitutional: Negative for chills.   HENT: Positive for sneezing.    Gastrointestinal: Positive for blood in stool, diarrhea, " "nausea and vomiting.   Genitourinary: Positive for dysuria and hematuria.   Musculoskeletal: Positive for arthralgias and myalgias.   Skin: Positive for rash.   Neurological: Negative for headaches.       Physical Exam   BP: 130/64  Pulse: 72  Temp: 98.4  F (36.9  C)  Resp: 18  Height: 181.6 cm (5' 11.5\")  Weight: 92.1 kg (203 lb 1.6 oz)  SpO2: 97 %      Physical Exam   Constitutional: No distress.   Chronically ill appearing adult male. Cooperative, NAD. Appears to be slightly sedated.   HENT:   Head: Atraumatic.   Mouth/Throat: Oropharynx is clear and moist. No oropharyngeal exudate.   Eyes: Pupils are equal, round, and reactive to light. No scleral icterus.   Pinpoint pupils   Cardiovascular: Normal rate, regular rhythm, normal heart sounds and intact distal pulses.    Pulmonary/Chest: Effort normal and breath sounds normal. No respiratory distress. He has no wheezes. He has no rales.   Abdominal: Soft. Bowel sounds are normal. There is no tenderness.   G tube in place. Abd soft, diffusely TTP globally without guarding or rebound. Normal bowel sounds.    Musculoskeletal: He exhibits edema. He exhibits no tenderness.   1+ edema to LE B. Compression socks in place.    Skin: Skin is warm. No rash noted. He is not diaphoretic.   Nursing note and vitals reviewed.      ED Course     ED Course     Procedures             Critical Care time:  none               Results for orders placed or performed during the hospital encounter of 10/09/18 (from the past 24 hour(s))   Blood culture   Result Value Ref Range    Specimen Description Blood Cm     Special Requests Received in aerobic bottle only     Culture Micro PENDING    Blood culture   Result Value Ref Range    Specimen Description Blood Right Arm     Special Requests Received in aerobic bottle only     Culture Micro PENDING    CBC with platelets differential   Result Value Ref Range    WBC 5.5 4.0 - 11.0 10e9/L    RBC Count 3.87 (L) 4.4 - 5.9 10e12/L    Hemoglobin " 12.0 (L) 13.3 - 17.7 g/dL    Hematocrit 37.3 (L) 40.0 - 53.0 %    MCV 96 78 - 100 fl    MCH 31.0 26.5 - 33.0 pg    MCHC 32.2 31.5 - 36.5 g/dL    RDW 13.5 10.0 - 15.0 %    Platelet Count 103 (L) 150 - 450 10e9/L    Diff Method Automated Method     % Neutrophils 39.9 %    % Lymphocytes 30.3 %    % Monocytes 23.5 %    % Eosinophils 5.6 %    % Basophils 0.7 %    % Immature Granulocytes 0.0 %    Nucleated RBCs 0 0 /100    Absolute Neutrophil 2.2 1.6 - 8.3 10e9/L    Absolute Lymphocytes 1.7 0.8 - 5.3 10e9/L    Absolute Monocytes 1.3 0.0 - 1.3 10e9/L    Absolute Eosinophils 0.3 0.0 - 0.7 10e9/L    Absolute Basophils 0.0 0.0 - 0.2 10e9/L    Abs Immature Granulocytes 0.0 0 - 0.4 10e9/L    Absolute Nucleated RBC 0.0     Platelet Estimate Confirming automated cell count    Comprehensive metabolic panel   Result Value Ref Range    Sodium 143 133 - 144 mmol/L    Potassium 3.6 3.4 - 5.3 mmol/L    Chloride 106 94 - 109 mmol/L    Carbon Dioxide 29 20 - 32 mmol/L    Anion Gap 9 3 - 14 mmol/L    Glucose 102 (H) 70 - 99 mg/dL    Urea Nitrogen 14 7 - 30 mg/dL    Creatinine 0.76 0.66 - 1.25 mg/dL    GFR Estimate >90 >60 mL/min/1.7m2    GFR Estimate If Black >90 >60 mL/min/1.7m2    Calcium 8.1 (L) 8.5 - 10.1 mg/dL    Bilirubin Total 0.3 0.2 - 1.3 mg/dL    Albumin 3.2 (L) 3.4 - 5.0 g/dL    Protein Total 6.6 (L) 6.8 - 8.8 g/dL    Alkaline Phosphatase 79 40 - 150 U/L    ALT 21 0 - 70 U/L    AST 15 0 - 45 U/L   Lactic acid   Result Value Ref Range    Lactic Acid 0.7 0.7 - 2.0 mmol/L   UA with Microscopic reflex to Culture   Result Value Ref Range    Color Urine Yellow     Appearance Urine Clear     Glucose Urine Negative NEG^Negative mg/dL    Bilirubin Urine Negative NEG^Negative    Ketones Urine Negative NEG^Negative mg/dL    Specific Gravity Urine 1.015 1.003 - 1.035    Blood Urine Small (A) NEG^Negative    pH Urine 7.0 5.0 - 7.0 pH    Protein Albumin Urine Negative NEG^Negative mg/dL    Urobilinogen mg/dL Normal 0.0 - 2.0 mg/dL    Nitrite  Urine Negative NEG^Negative    Leukocyte Esterase Urine Negative NEG^Negative    Source Midstream Urine     WBC Urine 1 0 - 5 /HPF    RBC Urine 16 (H) 0 - 2 /HPF    Squamous Epithelial /HPF Urine <1 0 - 1 /HPF     *Note: Due to a large number of results and/or encounters for the requested time period, some results have not been displayed. A complete set of results can be found in Results Review.          Assessments & Plan (with Medical Decision Making)   This is a very chronically ill 47-year-old male who presents to the emergency department today after being called by his clinic for positive blood cultures.  Patient has a history of prior gastric bypass, and ultimately had multiple complications of this including gastric ulcers, short gut syndrome, severe malnutrition, chronic abdominal pain, cardiomyopathy, who is currently TPN dependent.  He has had multiple recurrent episodes of bacteremia as well as line infections.  Prior blood cultures from May 31 grew out Enterococcus faecalis and staph epidermidis, which was multiply resistant, sensitive only to vancomycin and tetracycline.  He is also had staph hemolyticus grew out from a blood culture from May 31 which was sensitive only to vancomycin and tetracycline.  Of note the sensitivity profile of Enterococcus faecalis on that culture showed that it was pansensitive, particularly to ampicillin gentamicin, penicillin, and vancomycin.  Initial peripheral blood culture from 10/8/18 at 1500 demonstrated gram positive cocci in clusters. Second blood culture from 10.8.18 at 1500 was from [place] and showed gram-positive cocci in pairs and chains.  I think it is reasonable at this point to treat this aggressively.  His symptoms including fevers, chills, fatigue, muscle aches certainly could be consistent with bacteremia.       We did access his port and we did draw blood.  CBC shows a white count of 5.5, hemoglobin is 12 and platelet count is 103,000. Basic metabolic  "panel is essentially within normal limits.  Lactate is WNL at 0.7.  Blood culture x2 was obtained.  UA shows 16 RBC but no e/o infection.  Based on prior cultures I think we need to treat with vancomycin.  He has a \"allergy\" to vancomycin which is listed as \"rash, questionable red man syndrome \".  I have ordered some Benadryl for him and we will order some vancomycin.  Patient will need to be admitted to medicine for further treatment, this could be a line infection however given the fact that both cultures were positive, I have more concern for a true bacteremia and concern for developing sepsis.  Patient will be admitted to medicine as a bed is available.    I have reviewed the nursing notes.    I have reviewed the findings, diagnosis, plan and need for follow up with the patient.    New Prescriptions    No medications on file       Final diagnoses:   Recurrent bacteremia   On total parenteral nutrition   Other chronic pain   IFrancis, am serving as a trained medical scribe to document services personally performed by Ramona Oviedo MD, based on the provider's statements to me.      IRamona MD, was physically present and have reviewed and verified the accuracy of this note documented by Francis Zuñiga.     10/9/2018   [facility], [place], EMERGENCY DEPARTMENT     Ramona Oviedo MD  10/10/18 0224    "

## 2018-10-10 NOTE — ED NOTES
Bed: IN02  Expected date:   Expected time: 9:15 PM  Means of arrival: Car  Comments:  Parker Acevedo 74 will be brought in for positive blood cultures.

## 2018-10-10 NOTE — PROGRESS NOTES
[facility] now requests orders and shares plan of care/discharge summaries for some patients through [employer].  Please REPLY TO THIS MESSAGE OR ROUTE BACK TO THE AUTHOR in order to give authorization for orders when needed.  This is considered a verbal order, you will still receive a faxed copy of orders for signature.  Thank you for your assistance in improving collaboration for our patients.    ORDER  SN 1 wk 9, 3 PRN   Lab draws per \[facility]\"" protocol    MD SUMMARY/PLAN OF CARE    SUMMARY TO MD PIERRE...Recert to [facility] for IV cares and lab draws. Lives at home with spouse and son, has steps to get into home, otherwise everything is on main level. Alert and oriented, pleasant and cooperative. Reports great fatigue and weakness. States it is extremely hard for him to leave the home for any length of time as he will be in a lot of pain and exhausted. In past 60 days had to have new Pinzon placed on 9/18. Reports having many central line infections and many different PORTS and PICCs. He now is having symptoms of an infection and blood cultures via PINZON and PERIPHERAL drawn today. Dressing was changed today using sterile technique, old dressing was starting to come off at arrival, was removed, area cleansed with chloraprep x 1 min, let dry x 3 min, skin prep applied, bio patch placed, tegaderm to cover, secured with 2nd clear tegaderm over top half of dressing to help keep in place. Line out 2.5cm and sutured in. Was first placed on 9/18 line out 0.2 cm. Slight redness noted around suture, did not appear infected, pt is febrile.  Labs drawn from central line, flushed line with 10mL NS, withdrew 10mL waste blood, filled tubes, flushed with 20mL NS and changed cap. Pt tolerated well. No heparin was flushed as pt was about to start IV hydration and then his TPN infusion, they will flush with heparin after both infusions. Spouse and pt are well versed in infusions as pt has been doing them for many years,  no concerns noted when spouse hooked up NS for hydration.  Reports eating very minimal foods orally and have to be soft so he can drain from GT. Patient reports has been vomitting more often and has noticed blood in vomit as well as drainage. Patient needs to have scope complete. Reports having loose bms every 4 to 6 weeks for a couple days at a time. Chronic abdominal pain managed with opiods. Reports hesistancy with urination. Denies any depression, does report some anxiety, ADHD symptoms and OCD symptoms. Lungs cta, denies any problems with resp status.  VS.../60, P50, RR18, T100.8, O2 sats 93 percent on RA  BACKGROUND...47 year old male had vascular access for ongoing need for chronic TPN.  He has had multiple surgeries for complications following bariatric surgery in the past, essentially resulting in a total gastrectomy.  He has chronic abdominal pain and inability to maintain adequate calories through oral means.  He has had previous ports placed and used without difficulty but has had recurring infections, requiring removal.  He is currently maintained with a PICC line for approximately the past month.  He is followed by infectious disease and has had negative blood cultures recently and has been cleared for Cm placement.  He has had no fevers or chills for multiple weeks.  ANALYSIS...at risk for hospitalization d/t high risk for infection and immunosuppression  RECOMMENDATION...SN to assess vital signs and weight, respiratory and cardiac status, pain level and activity tolerance, hydration, nutrition and bowel status, skin integrity and home safety. SN to teach medication management; assess compliance with medications, response to medication changes, TPN management, lab draws and Cm dressing changes.

## 2018-10-10 NOTE — CONSULTS
"  [facility] ID SERVICE: NEW CONSULTATION   Parker Acevedo Sr : 1970 Sex: male:   Medical record number [id number]  Date of Admission: 10/9/2018  Consult Requester:Álvaro Dickerson, *  Date of Service: October 10, 2018    REASON FOR CONSULTATION:   \"Recurrent bacteremia with history of multidrug resistant organisms\"     PROBLEM LIST: (New and established)  1. Recurrent CLABSI w/ current polymicrobial bacteriemia   2. Severe Malnutrition w/ chronic TPN dependence following Gm-en-Y gastric bypass with short gut syndrome    RECOMMENDATIONS:   1. Continue IV vancomycin with addition of ceftriaxone and metronidazole (given GNRs on cx and possible abdominal source)   2. Repeat blood cultures until negative for 72 hours   3. Plan for current tunneled right internal jugular will depend on ongoing blood culture results   4. Penicillin allergy skin testing (ordered for you)  5. Can hold vancomycin for 24 hours to do allergy test (as pt is receiving benadryl w/ vanco which interferes with allergy testing)   6. Recommend GI consultation for consideration of EGD/colonoscpy as a GI source of recurrent bacteremia is on ddx     DISCUSSION:   Parker Acevedo Sr is a 47 year old chronically ill man with PMHx of gm-en-Y gastric bypass () complicated by short gut syndrome and severe malnutrition s/p G-tube (non-functional from nutrition standpoint, used primarily for venting) on chronic TPN (s/p R tunneled internal jugular placed 18) and hx of recurrent line infections with polymicrobial bacteremia admitted with fevers and blood cultures positive for GP in clusters, pairs and chains and GNRs.    Blood cultures from 10/8 and 10/10 positive with Verigene testing appearing to have ruled out multiple species of staph/strep/enteroccocus however in polymicrobial infections, which pt is showing evidence of, Verigene can be less useful. Pt's indwelling line is a possible source of infection, however " given significant GI hx (partifualry ulcer hx) and previous cultures positive for enterococcus, a GI source of bacteremia remains possible and should be further assessed with GI consultation for consideration of EGD/colonoscopy. It would also be reasonable to involve GI from standpoint of nutrition, as ongoing TPN will continue to put pt at risk for recurrent CLABSI's.     Overall, will await speciation on blood cultures while continuing vancomycin, ceftriaxone and metronidazole. In the meantime, given recurrence CLABSI's, would be reasonable to pursue penicillin allergy skin testing.      ID will continue to follow  Bronson Pastor M.D.  [facility] ID Resident   [id number]       HPI: (Extended HPI: Requires four HPI elements or the status of three chronic problems)  Parker Acevedo Sr is a 47 year old chronically ill man with PMHx of gm-en-Y gastric bypass (2002) complicated by short gut syndrome and severe malnutrition s/p G-tube (non-functional from nutrition standpoint, used primarily for venting) on chronic TPN (s/p R tunneled internal jugular placed 9/18/18) and hx of recurrent line infections with polymicrobial bacteremia admitted with fevers and blood cultures positive for GPC's in pairs and chains.    Per pt, he as doing well and received flu shot Friday of last week. On Sat, wife noticed fever to 101 and pt noted generalized malaise. Fatigue failed to improve Sunday and pt spike fever to 102.6, which prompted ordering of blood cultures (perifpheral and from indwelling right internal jugular) which were obtained on 10/8. Cultures came back positive for GPC in clusters and also pairs and chains.     Pt presented to ED 10/9 given positive cx and was started on IV vancomycin. He remains HD stable with no leukocytosis. UA bland. Pt notes no change in chronic nausea, abdominal pain, headaches and intermittent diarrhea. Notes itchy papular skin rash on forehead and arms b/l, which started over the weekend. Pt  does not increase in blood discharge from tube, which he attributes to ulcers. He notes he was seen in [place] ED for chest pain last week and his line was accessed at that time. No erythema around line or g tube.     All available records were reviewed and summarized above.  This ID consultation question represents a new problem, with additional work-up planned and described in the recommendations section above.  ANTI-INFECTIVES:   Current: Vancomycin (10/9 - present)   Previous: None PTA   Other current medications were reviewed with an eye on potential drug-drug interactions.  ROS: (Recommend ? 10 systems)   A ten point review of systems was obtained and was negative with the exception of that which is described in the HPI.  PMH: (At least ONE specific item from THREE of the three components of PFSH must be documented for a Complete PFSH)  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)      Chronic abdominal pain      Complication of anesthesia      Difficulty swallowing      Gastric ulcer, unspecified as acute or chronic, without mention of hemorrhage, perforation, or obstruction      Gastro-oesophageal reflux disease      Generalized weakness 1/30/2018     Head injury      Hiatal hernia      Other bladder disorder      Other chronic pain      PONV (postoperative nausea and vomiting)      Severe malnutrition (H)     TPN     Short gut syndrome      Tobacco abuse      Past Surgical History:   Procedure Laterality Date     AMPUTATION       APPENDECTOMY       BACK SURGERY  11/3/2014    curve in the spine     BIOPSY LYMPH NODE CERVICAL N/A 2/20/2015    Procedure: BIOPSY LYMPH NODE CERVICAL;  Surgeon: Baron Scanlon MD;  Location:  OR     C GASTRIC BYPASS,OBESE<100CM SHAYLEE-EN-Y  2002    lost 300 pounds      CHOLECYSTECTOMY       DISCECTOMY, FUSION CERVICAL ANTERIOR ONE LEVEL, COMBINED N/A 2/15/2017    Procedure: COMBINED DISCECTOMY, FUSION CERVICAL ANTERIOR ONE LEVEL;  Surgeon: Darren Campos MD;  Location: [facility]     ENDOSCOPIC INSERTION TUBE GASTROSTOMY  9/9/2013    Procedure: ENDOSCOPIC INSERTION TUBE GASTROSTOMY;;  Surgeon: Francis Vyas MD;  [facility]     ENDOSCOPIC ULTRASOUND UPPER GASTROINTESTINAL TRACT (GI)  4/29/2011    Procedure:ENDOSCOPIC ULTRASOUND UPPER GASTROINTESTINAL TRACT (GI); Both Procedures done Conjointly; Surgeon:NEREIDA HOUSER; Location:[facility] OR     ENDOSCOPIC ULTRASOUND UPPER GASTROINTESTINAL TRACT (GI)  9/9/2013    Procedure: ENDOSCOPIC ULTRASOUND UPPER GASTROINTESTINAL TRACT (GI);  Endoscopic Ultrasound Guide Gastrostomy Tube Placement  C-arm;  Surgeon: Noe Lizarraga MD;  [facility]     ENDOSCOPY  03/25/11    EGD, [facility]     ENDOSCOPY  08/04/09    Upper Endoscopy, [facility]     ENDOSCOPY  01/05/09    Upper Endoscopy, [facility]     ESOPHAGOSCOPY, GASTROSCOPY, DUODENOSCOPY (EGD), COMBINED  4/20/2011    Procedure:COMBINED ESOPHAGOSCOPY, GASTROSCOPY, DUODENOSCOPY (EGD); Surgeon:FRANCIS VYAS; Location[facility]     ESOPHAGOSCOPY, GASTROSCOPY, DUODENOSCOPY (EGD), COMBINED  6/15/2011    Procedure:COMBINED ESOPHAGOSCOPY, GASTROSCOPY, DUODENOSCOPY (EGD); Surgeon:FRANCIS VYAS; [facility]     ESOPHAGOSCOPY, GASTROSCOPY, DUODENOSCOPY (EGD), COMBINED  6/12/2013    Procedure: COMBINED ESOPHAGOSCOPY, GASTROSCOPY, DUODENOSCOPY (EGD);;  Surgeon: Francis Vyas MD;  [facility]     ESOPHAGOSCOPY, GASTROSCOPY, DUODENOSCOPY (EGD), COMBINED  11/22/2013    Procedure: COMBINED ESOPHAGOSCOPY, GASTROSCOPY, DUODENOSCOPY (EGD);;  Surgeon: Francis Vyas MD;  [facility]     ESOPHAGOSCOPY, GASTROSCOPY, DUODENOSCOPY (EGD), COMBINED  4/30/2014    Procedure: COMBINED ESOPHAGOSCOPY, GASTROSCOPY, DUODENOSCOPY (EGD);  Surgeon:  Francis Vyas MD;  Location:  GI     ESOPHAGOSCOPY, GASTROSCOPY, DUODENOSCOPY (EGD), COMBINED N/A 2/20/2015    Procedure: COMBINED ESOPHAGOSCOPY, GASTROSCOPY, DUODENOSCOPY (EGD), BIOPSY SINGLE OR MULTIPLE;  Surgeon: Baron Scanlon MD;  Location: [facility]     ESOPHAGOSCOPY, GASTROSCOPY, DUODENOSCOPY (EGD), COMBINED N/A 9/30/2015    Procedure: COMBINED ESOPHAGOSCOPY, GASTROSCOPY, DUODENOSCOPY (EGD);  Surgeon: Francis Vyas MD;  Location:  GI     GASTRECTOMY  6/22/2012    Procedure: GASTRECTOMY;  Open Approach, Excise Ulcers,Partial Gastrectomy, Esophagojejunostomy, Hiatal Hernia Repair, Extensive Lysis of Adhesions and Esaphagogastrodudenoscopy.;  Surgeon: Francis Vyas MD;  [facility]     GASTROJEJUNOSTOMY  08/26/09    Extensice enterolysis, partial resect. jejunum, part. resect gastric pouch, gastrojejunostomy anastomosis     HC ESOPH/GAS REFLUX TEST W NASAL IMPED ELECTRODE  8/5/2013    Procedure: ESOPHAGEAL IMPEDENCE FUNCTION TEST 1 HOUR OR LESS;  Surgeon: Halie Lang MD;  Location:  GI     HEAD & NECK SURGERY  2/15/2017    C5-C6     HERNIA REPAIR  2006    Umbilical hernia     HERNIORRHAPHY HIATAL  6/22/2012    Procedure: HERNIORRHAPHY HIATAL;;  Surgeon: Francis Vyas MD;  [facility]     HERNIORRHAPHY INGUINAL  11/22/2013    Procedure: HERNIORRHAPHY INGUINAL;;  Surgeon: Francis Vyas MD;  [facility]     INSERT PORT VASCULAR ACCESS Right 12/19/2017    Procedure: INSERT PORT VASCULAR ACCESS;  Right Chest Port Placement ;  Surgeon: Lisandro Alejandro PA-C;  [facility]: [facility] OR     INSERT PORT VASCULAR ACCESS Right 8/2/2018    Procedure: INSERT PORT VASCULAR ACCESS;  Place single lumen tunneled central venous access catheter;  Surgeon: Guy Jamil PA-C;  Location:  OR     LAPAROTOMY EXPLORATORY  11/22/2013    Procedure: LAPAROTOMY EXPLORATORY;  Exploratory Laparotomy, Upper Endoscopy, Left Inguinal Hernia Repair;  Surgeon: Lilliam  "Francis Castanon MD;  [facility]     ORTHOPEDIC SURGERY       PICC INSERTION Right 03/16/2017    5fr DL BioFlo PICC, 42cm (3cm external) in the R medial brachial vein w/ tip in the SVC RA junction.     PICC INSERTION Left 09/23/2017    5fr DL BioFlo PICC, 45cm (1cm external) in the L basilic vein w/ tip in the SVC RA junction.     SHAYLEE EN Y BOWEL  2003     SOFT TISSUE SURGERY       SOFT TISSUE SURGERY       THORACIC SURGERY       TONSILLECTOMY       Medication allergies were reviewed.   SOCIAL HISTORY AND RISK FACTORS   Social History   Substance Use Topics     Smoking status: Current Some Day Smoker     Packs/day: 0.25     Years: 3.00     Types: Cigarettes     Last attempt to quit: 7/28/2018     Smokeless tobacco: Never Used      Comment: I use an e cig every now and than     Alcohol use No      Comment: quit 2002     History   Sexual Activity     Sexual activity: Yes     Partners: Female     Birth control/ protection: None     Comment: no protection     FAMILY HISTORY:   Reviewed  Family History   Problem Relation Age of Onset     GASTROINTESTINAL DISEASE Mother      Crohns disease     Anxiety Disorder Mother      Thyroid Disease Mother      Grave's disease     Cancer Father      ear cancer-skin cancer/melanoma     Breast Cancer Maternal Grandmother      Anxiety Disorder Sister      Diabetes Maternal Uncle      Breast Cancer Other      Hypertension No family hx of      Hyperlipidemia No family hx of      Cerebrovascular Disease No family hx of      Prostate Cancer No family hx of      Depression No family hx of      Anesthesia Reaction No family hx of      Asthma No family hx of      Osteoporosis No family hx of      Genetic Disorder No family hx of      Obesity No family hx of      Mental Illness No family hx of      Substance Abuse No family hx of        EXAMINATION: (Recommend ? 9 systems; 2 items each)   /74  Pulse 66  Temp 98.8  F (37.1  C) (Oral)  Resp 16  Ht 1.816 m (5' 11.5\")  Wt 92.1 kg (203 " lb 1.6 oz)  SpO2 98%  BMI 27.93 kg/m2  GENERAL:  Chronically ill appearing, NAD.   EYES:  Eyes have anicteric sclerae without conjunctival injection or stigmata of endocarditis.    ENT:  Atraumatic. Oropharynx is moist without exudates or ulcers. Normal hard and soft palate  NECK:  Supple. No cervical lymphadenopathy  LUNGS:  Clear to auscultation bilateral. Respiratory effort is normal  CARDIOVASCULAR:  Regular rate and rhythm with no murmurs, gallops or rubs.  ABDOMEN:  Normal bowel sounds, soft, nontender. No appreciable hepatosplenomegaly. G tube with no surrounding erythema.  SKIN:  No acute rashes.  Line(s) are in place without any surrounding erythema or exudate. No stigmata of endocarditis.  NEUROLOGIC:  Grossly nonfocal. Active x4 extremities  PSYCH: Appropriate affect, alert and oriented to person, place and time  CURRENT LINES: Right tunneled internal jugular and G tube   RELEVANT DATA:   Reviewed medicine test (PFTs, EKG, cardiac echo or cath): YES   BASIC LABS   The following basic labs were personally reviewed:  Inflammatory Markers    Recent Labs   Lab Test  08/20/18   1442  06/28/18   1400  06/21/18   1148  06/02/18   0609  06/01/18   1807  02/16/18   1515  02/12/18   1400  01/23/18   0845  01/20/18   1030   09/24/17   1900   06/28/17   2222  03/12/17   0351   11/01/16   1530   04/20/16   1530  04/11/16   1842   SED   --    --    --    --    --    --    --   10  11   --   31*   --   26*  17*   --   27*   --   34*  11   CRP  <2.9  <2.9  <2.9  30.0*  26.0*  8.2*  <2.9  <2.9  5.4   < >  26.6*   < >  53.0*  3.4   < >  8.4*   < >  12.3*  80.0*    < > = values in this interval not displayed.       Hematology Studies    Recent Labs   Lab Test  10/10/18   0038  10/08/18   1500  10/03/18   1941  09/24/18   1440  09/18/18   0848  09/11/18   2248  09/06/18   1336  08/20/18   1442  08/10/18   1350   WBC  5.5  6.0  9.0  4.6  7.2  8.1  10.2  5.6  8.3   ANEU  2.2  3.8  5.5  2.8   --   3.9  6.0  3.1  4.5   AEOS   0.3  0.2   --   0.1   --    --   0.2  0.2  0.3   HGB  12.0*  12.3*  13.0*  12.9*  12.5*  13.2*  14.3  13.0*  12.8*   MCV  96  96  94  96  95  95  95  96  96   PLT  103*  121*  168  160  132*  142*  172  156  165       Immune Globulin Studies  No lab results found.    Metabolic Studies     Recent Labs   Lab Test  10/10/18   0038  10/08/18   1500  10/03/18   1941  09/24/18   1440  09/18/18   0848   NA  143  142  145*  144  143   POTASSIUM  3.6  3.6  3.6  3.4  3.3*   CHLORIDE  106  105  111*  108  109   CO2  29  30  26  27  30   BUN  14  13  7  8  8   CR  0.76  0.82  0.75  0.66  0.61*   GFRESTIMATED  >90  >90  >90  >90  >90       Hepatic Studies    Recent Labs   Lab Test  10/10/18   0038  10/08/18   1500  09/24/18   1440  09/06/18   1336  08/20/18   1442  08/10/18   1350   BILITOTAL  0.3  0.3  0.6  0.4  0.5  0.7   ALKPHOS  79  90  85  98  78  87   ALBUMIN  3.2*  3.5  3.6  3.5  3.7  3.7   AST  15  15  13  13  10  11   ALT  21  26  22  30  19  25       Thyroid Studies    Recent Labs   Lab Test  11/03/14   1355  05/20/14   1252   TSH  0.96  1.39       MICROBIOLOGY LABS  The following microbiology studies were personally reviewed:  Culture Micro   Date Value Ref Range Status   10/10/2018 No growth after 5 hours  Preliminary   10/10/2018 No growth after 5 hours  Preliminary   10/08/2018 (A)  Preliminary    Cultured on the 1st day of incubation:  Gram positive cocci in pairs and chains     10/08/2018   Preliminary    Critical Value/Significant Value, preliminary result only, called to and read back by  Dr. Daly on 10.9.18 at 0800. bw     10/08/2018   Preliminary    (Note)  NEGATIVE for the following: Staphylococcus spp., Staph aureus, Staph  epidermidis, Staph lugdunensis, Streptococcus spp., Strep pneumoniae,  Strep pyogenes, Strep agalactiae, Strep anginosus group, Enterococcus  faecalis, Enterococcus faecium, and Listeria spp. by Indi-[employer] Publishingigene  multiplex nucleic acid test. Final identification and  antimicrobial  susceptibility testing will be verified by standard methods.    Critical Value/Significant Value called to and read back by Nafisa Escalante RN ([facility]) at 1025 on 10.9.18 RD.        10/08/2018 (A)  Preliminary    Cultured on the 1st day of incubation:  Gram positive cocci in clusters     10/08/2018   Preliminary    Critical Value/Significant Value, preliminary result only, called to and read back by  Nafisa Escalante RN on 10.9.18 at 1407. bw     10/08/2018 Susceptibility testing in progress  Preliminary   10/08/2018   Preliminary    (Note)  POSITIVE for Staphylococci other than S.aureus, S.epidermidis and  S.lugdunensis, by M[employer]igene multiplex nucleic acid test.  Coagulase-negative staphylococci are the most common venipuncture or  collection associated skin CONTAMINANTS grown in blood cultures.  Final identification and antimicrobial susceptibility testing will be  verified by standard methods.    Specimen tested with Verigene multiplex, gram-positive blood culture  nucleic acid test for the following targets: Staph aureus, Staph  epidermidis, Staph lugdunensis, other Staph species, Enterococcus  faecalis, Enterococcus faecium, Streptococcus species, S. agalactiae,  S. anginosus grp., S. pneumoniae, S. pyogenes, Listeria sp., mecA  (methicillin resistance) and Melvin/B (vancomycin resistance).    Critical Value/Significant Value called to and read back by Nafisa Aponte RN @ 8615 10/9/18 CS       06/28/2018 No growth  Final   06/28/2018 No growth  Final   06/21/2018 No growth  Final   06/21/2018 No growth  Final   06/04/2018 No growth  Final   06/04/2018 No growth  Final   06/04/2018 No growth  Final   06/02/2018 Canceled, Test credited  Final   06/02/2018 Canceled via EPIC interface  Final   06/02/2018 Canceled, Test credited  Final   06/02/2018 Canceled via EPIC interface  Final   06/01/2018 No growth  Final   06/01/2018 (A)  Final    Cultured on the 1st day of incubation:  Enterococcus  faecalis     06/01/2018   Final    Critical Value/Significant Value, preliminary result only, called to and read back by  ROGER MARTIN RN U5A 0455 06.02.18 CF     06/01/2018 Susceptibility testing done on previous specimen  Final   05/31/2018 (A)  Final    Cultured on the 1st day of incubation:  Enterococcus faecalis  Susceptibility testing done on previous specimen     05/31/2018   Final    Critical Value/Significant Value, preliminary result only, called to and read back by  Savannah Solitario RN from [facility]. 6.1.18 at 0525. GR.     05/31/2018 (A)  Final    Cultured on the 1st day of incubation:  Staphylococcus epidermidis     05/31/2018   Final    Critical Value/Significant Value called to and read back by  Maday Tan RN 5A at 1215 on 6/4/18 by ALMA.     05/31/2018 (A)  Final    Cultured on the 1st day of incubation:  Enterococcus faecalis     05/31/2018   Final    Critical Value/Significant Value, preliminary result only, called to and read back by  Savannah Jarertt, on call RN for URI. 6.1.18 at 0237. GR.      05/31/2018 (A)  Final    Cultured on the 1st day of incubation:  Staphylococcus haemolyticus     05/31/2018   Final    Critical Value/Significant Value called to and read back by  Maday Tan RN at 1515 on 6/4/18 by ALMA.     05/31/2018   Final    (Note)  POSITIVE for ENTEROCOCCUS FAECALIS and NEGATIVE for Melvin/vanB genes  by Nexgateigene multiplex nucleic acid test. Final identification and  antimicrobial susceptibility testing will be verified by standard  methods.    Specimen tested with Verigene multiplex, gram-positive blood culture  nucleic acid test for the following targets: Staph aureus, Staph  epidermidis, Staph lugdunensis, other Staph species, Enterococcus  faecalis, Enterococcus faecium, Streptococcus species, S. agalactiae,  S. anginosus grp., S. pneumoniae, S. pyogenes, Listeria sp., mecA  (methicillin resistance) and Melvin/B (vancomycin resistance).    Critical Value/Significant Value  called to and read back by Savannah Jarrett RN from [facility]. 6.1.18 at 0521. GR.     01/15/2018 <15 colonies   Escherichia coli   (A)  Final   01/14/2018 No growth  Final   01/14/2018 No growth  Final   01/13/2018 (A)  Final    Cultured on the 1st day of incubation:  Enterococcus faecalis  Susceptibility testing done on previous specimen     01/13/2018 (A)  Final    Upon further review of the original slide, no gram negative rods are seen  Previously reported as:  Cultured on the 1st day of incubation:  Gram negative rods  CORRECTED ON:  1.20.18 @1407 AV     01/13/2018   Final    Critical Value/Significant Value, preliminary result only, called to and read back by  Taylor Bhatt RN from [facility]. 1.14.18 at 0111. GR.     01/13/2018   Final    Corrected report called to and read back by  DENNIS Dooley 1.20.18 @1410 AV     01/13/2018 (A)  Final    Cultured on the 1st day of incubation:  Enterococcus faecalis  Susceptibility testing done on previous specimen     01/13/2018   Final    Critical Value/Significant Value, preliminary result only, called to and read back by  Shorty Montgomery RN at 1953 on 01.13.18 by mp     01/13/2018 (A)  Final    Cultured on the 1st day of incubation:  Staphylococcus hominis     01/13/2018   Final    Critical Value/Significant Value called to and read back by  Kasia Jones RN on 1.16.2018 at 1114. KVO     01/12/2018 (A)  Final    Cultured on the 1st day of incubation:  Escherichia coli  Susceptibility testing done on previous specimen     01/12/2018 (A)  Final    Cultured on the 1st day of incubation:  Enterococcus faecalis     01/12/2018   Final    Critical Value/Significant Value, preliminary result only, called to and read back by  Shorty Gardner RN 7B @ 0836.cg 01/13/18 01/12/2018   Final    (Note)  POSITIVE for ENTEROCOCCUS FAECALIS and NEGATIVE for Melvin/vanB genes  by ImmuMetrixigene multiplex nucleic acid test. Final identification and  antimicrobial susceptibility testing will be verified by  standard  methods.    Specimen tested with Verigene multiplex, gram-positive blood culture  nucleic acid test for the following targets: Staph aureus, Staph  epidermidis, Staph lugdunensis, other Staph species, Enterococcus  faecalis, Enterococcus faecium, Streptococcus species, S. agalactiae,  S. anginosus grp., S. pneumoniae, S. pyogenes, Listeria sp., mecA  (methicillin resistance) and Melvin/B (vancomycin resistance).    Critical Value/Significant Value called to and read back by Shorty Gardner RN 7B @ 1129.cg 01/13/18 01/12/2018 No growth  Final   01/12/2018 No growth  Final   01/12/2018 (A)  Final    Cultured on the 1st day of incubation:  Escherichia coli     01/12/2018   Final    Critical Value/Significant Value, preliminary result only, called to and read back by  Dr Jeet Orozco  in the [facility] at 8:20am 1/12/2018 ([facility])     01/12/2018   Final    (Note)  POSITIVE for E.COLI by Verigene multiplex nucleic acid test. Final  identification and antimicrobial susceptibility testing will be  verified by standard methods. Verigene test will not distinguish  E.coli from Shigella species including S.dysenteriae, S.flexneri,  S.boydii, and S.sonnei. Specimens containing Shigella species or  E.coli will be reported as Positive for E.coli.    Specimen tested with Verigene multiplex, gram-negative blood culture  nucleic acid test for the following targets: Acinetobacter sp.,  Citrobacter sp., Enterobacter sp., Proteus sp., E. coli, K.  pneumoniae/oxytoca, P. aeruginosa, and the following resistance  markers: CTXM, KPC, NDM, VIM, IMP and OXA.    Critical Value/Significant Value called to and read back by Dr. Jeet Orozco at 1042 on 1.12.18.KD     09/24/2017 No growth  Final   09/24/2017 No growth  Final   09/23/2017 No growth  Final   09/23/2017 No growth  Final   09/22/2017 No growth  Final   09/22/2017 No growth  Final   09/22/2017 No growth  Final   09/21/2017 No growth  Final   09/21/2017 (A)  Final    Cultured on the  1st day of incubation:  Streptococcus mitis group  Identification obtained by MALDI-TOF mass spectrometry research use only database. Test   characteristics determined and verified by the [facility]   ([facility]) [place], [place].     09/21/2017   Final    Critical Value/Significant Value, preliminary result only, called to and read back by  Nathaly Yo RN on 9/22/2017 @2011, tk     09/21/2017 (A)  Final    Cultured on the 1st day of incubation:  Streptococcus salivarius  Susceptibility testing done on previous specimen     09/21/2017   Final    Critical Value/Significant Value, preliminary result only, called to and read back by  Karine Meyers RN U5B 0400 09.22.17 CF     09/21/2017 (A)  Final    Cultured on the 1st day of incubation:  Rothia mucilaginosa  Susceptibility testing done on previous specimen     09/21/2017 (A)  Final    Cultured on the 1st day of incubation:  Staphylococcus epidermidis     09/21/2017 No growth  Final   09/21/2017 No growth  Final   09/20/2017 No growth  Final   09/20/2017 No growth  Final   09/19/2017 No growth  Final   09/19/2017 (A)  Preliminary    Cultured on the 2nd day of incubation:  Streptococcus salivarius  Susceptibility testing done on previous specimen     09/19/2017   Preliminary    Critical Value/Significant Value, preliminary result only, called to and read back by  Annel Hirsch RN at 0814 on [id number].KD      09/19/2017 (A)  Preliminary    Cultured on the 2nd day of incubation:  Gram positive cocci in clusters  Susceptibility testing done on previous specimen     09/19/2017 Referred to research section for identification  Preliminary   09/19/2017 (A)  Final    Cultured on the 1st day of incubation:  Streptococcus salivarius     09/19/2017   Final    Critical Value/Significant Value, preliminary result only, called to and read back by  Dr. Jimenez, from [facility]. 09.19.17 at 1357. GR.     09/19/2017 (A)  Final    Cultured on the 1st day of  incubation:  Streptococcus salivarius  Susceptibility testing done on previous specimen     09/19/2017   Final    Critical Value/Significant Value, preliminary result only, called to and read back by  Dr. Jimenez from [facility] 09.19.17 at 1041. GR.     09/19/2017 (A)  Final    Cultured on the 1st day of incubation:  Streptococcus mitis group     09/19/2017 (A)  Final    Cultured on the 1st day of incubation:  Rothia mucilaginosa     09/19/2017   Final    Critical Value/Significant Value called to and read back by  KACIE Rose RN on 9.23.17 at 1101. bw     09/19/2017   Final    (Note)  NEGATIVE for the following: Staphylococcus spp., Staph aureus, Staph  epidermidis, Staph lugdunensis, Streptococcus spp., Strep pneumoniae,  Strep pyogenes, Strep agalactiae, Strep anginosus group, Enterococcus  faecalis, Enterococcus faecium, and Listeria spp. by Verigene  multiplex nucleic acid test. Final identification and antimicrobial  susceptibility testing will be verified by standard methods.    Critical Value/Significant Value called to and read back by Dr. Jimenez from [facility] 09.19.17 at 1357. GR.     08/01/2017 No growth  Final   06/28/2017 No growth  Final   06/28/2017 No growth  Final   06/26/2017 No growth  Final   06/26/2017 (A)  Final    Cultured on the 1st day of incubation: Streptococcus salivarius group  Cultured on the 1st day of incubation: Staphylococcus epidermidis  Critical Value/Significant Value, preliminary result only, called to and read   back by  Dr. Francis Vyas @1652 6/27/17. ct  Cultured on the 1st day of incubation: Rothia mucilaginosa  (Note)  POSITIVE for STAPHYLOCOCCUS EPIDERMIDIS and POSITIVE for the mecA  gene (resistant to methicillin) by Verigene multiplex nucleic acid  test. Final identification and antimicrobial susceptibility testing  will be verified by standard methods.    Specimen tested with Verigene multiplex, gram-positive blood culture  nucleic acid test for the following targets: Staph  aureus, Staph  epidermidis, Staph lugdunensis, other Staph species, Enterococcus  faecalis, Enterococcus faecium, Streptococcus species, S. agalactiae,  S. anginosus grp., S. pneumoniae, S. pyogenes, Listeria sp., mecA  (methicillin resistance) and Melvin/B (vancomycin resistance).    Critical Value/Significant Value called to and read back by Dr. Vyas @ 1942 6/27/17        04/11/2017 No growth  Final   04/09/2017 No growth  Final   04/09/2017 No growth  Final   03/12/2017 No growth  Final   03/12/2017 No growth  Final   03/08/2017 No growth  Final   03/07/2017 No growth  Final   03/07/2017 No growth  Final   10/30/2016 No growth  Final   10/29/2016 No growth  Final   10/28/2016 No growth  Final       Urine Studies    Recent Labs   Lab Test  10/10/18   0100  09/11/18   2345  06/01/18   2021  01/12/18   0042  09/19/17   0242  08/01/17   1133   LEUKEST  Negative  Negative  Negative  Negative  Negative  Negative   WBCU  1   --   <1  <1  2  O - 2       Vancomycin Levels    Recent Labs   Lab Test  06/14/18   2100  06/11/18   1330  06/08/18   1420   VANCOMYCIN  20.4  18.2  12.9       Serostatus:  No results found for: CMVG, CMIGG, CMIG, CMIM, CMVIGM, CMVM, CMLTX, HSVG1, HSVG2, HSVTP1, II3798, HS12M, HS12GR, HS1GR, HS2GR, HERPES, HSIM, HSIG, HSIGR, HSVIGMAB, HSVG1, VARICZOSAB, EBVIGG, EBIGG, EBVAGN, SZ2099  No results found for: EBIG2, EBIGM, TOXG  No lab results found.    Invalid input(s): HSV12, VZVG, WEW743    Toxoplasma Testing  No lab results found.    Invalid input(s): TOXPL, TOXABM, TOXPCR    Virology:  CMV viral loads  No lab results found.  No results found for: CMQNT, CMVQ  EBV viral loads   No lab results found.  No results found for: EBVDN, EBRES, EBVDN, EBVSP, EBVPC, EBVPCR  BK viral loads No lab results found.    Invalid input(s): BKDN, BKVPC, BKVPCR  HSV testing  No lab results found.    Hepatitis B Testing   Recent Labs   Lab Test  09/20/17   0549   HEPBANG  Nonreactive     Hepatitis C Testing      Hepatitis C Antibody   Date Value Ref Range Status   09/20/2017 Nonreactive NR^Nonreactive Final     Comment:     Assay performance characteristics have not been established for newborns,   infants, and children       Respiratory Virus Testing    No results found for: RS, FLUAG    IMAGING RESULTS  The following imaging studies were independently reviewed:    No relevant imaging from this hospital stay

## 2018-10-10 NOTE — H&P
History and Physical  Internal Medicine      Parker Acevedo Sr MRN:[id number] YOB: 1970  Date of Admission:10/9/2018  Primary care provider: Esteban Daly           History of Present Illness:   Parker Acevedo Sr is a 47 year old male cardiomyopathy, chronic abdominal pain, GERD, hiatal hernia, malnutrition on TPN s/p Celestino-en-Y gastric bypass (2002) and recurrent episodes of bacteremia/central line infections who presents to the Emergency Department after being directed to do so by his clinic due to positive blood cultures.     Was in normal health   Received flu vaccine   Developed fevers > 101  Endorses muscle aches, cramping, rash, pruritis, some SOB, increased sputum, nausea, emesis, diarrhea, worsening of abdominal pain, bloody nose   Has started improving since receiving abx in ed   No sick contacts   No cough, running nose, no CP  Has recurrent infections - this is his 2/3 this year   Requested blood cx  Was notified blood cultures positive and requested to present to ED for further evaluation      REVIEW OF SYSTEMS: A 10 point review of systems was negative except as noted above.         Assessment and Plan:   Parker Acevedo Sr is a 47 year old male cardiomyopathy, chronic abdominal pain, GERD, hiatal hernia, malnutrition on TPN s/p Celestino-en-Y gastric bypass (2002) and recurrent episodes of bacteremia/central line infections who due to positive blood cultures.    # Bacteremia   Has had recurrent infections. Now with positive blood cultures. AF/VSS in ED. WBC 5.5.   - Cm port cx (10/8): GPC in pairs and chains   - Blood Cx (10/8) : GPC in pairs and chains   - Blood Cx (10/8) : GPC in clusters   - Vancomycin q12   - Consider line holiday vs exchange after 24hr culture negative   - repeat BCx, CBC, BMP    Chronic  # ADHD - Adderal 20mg   # Anxiety - Ativan 1mg at bedtime PRN  # B12 Deficiency - B12  # Malnutrition - gets TPN through home care, nutrition consult    # Pain - PTA oxycodone, lidocaine & fentanyl patch   # Cardiomyopathy - PTA carvedilol     CODE: Full  DVT: SCDs  FEN: TPN, RD consulted    This patient will be staffed in the AM    Eusebio Romero IV, MD, MSc  Internal Medicine Resident, PGY2  [facility]   Pager [phone]    PAST MEDICAL HISTORY:  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)      Chronic abdominal pain      Complication of anesthesia      Difficulty swallowing      Gastric ulcer, unspecified as acute or chronic, without mention of hemorrhage, perforation, or obstruction      Gastro-oesophageal reflux disease      Generalized weakness 1/30/2018     Head injury      Hiatal hernia      Other bladder disorder      Other chronic pain      PONV (postoperative nausea and vomiting)      Severe malnutrition (H)     TPN     Short gut syndrome      Tobacco abuse      Patient Active Problem List   Diagnosis     Peptic ulcer disease     Gastric bypass status for obesity     Chronic abdominal pain     Vomiting     Anemia     ADHD (attention deficit hyperactivity disorder), inattentive type     Vitamin B12 deficiency without anemia     Thiamine deficiency     Dehydration     Dysphagia     Weight loss, non-intentional     Malnutrition (H)     Chronic anxiety     Constipation     Bile reflux esophagitis     Former smoker     Vitamin D deficiency     Iron deficiency     Health Care Home     Chronic pain     Insomnia     Positive blood culture     Fungemia     Chronic nausea     Gastrostomy tube in place (H)     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)     Short gut syndrome     Anxiety     Status post cervical spinal arthrodesis     Port or reservoir infection, initial encounter     Abnormal echocardiogram     Low serum iron      Anemia, iron deficiency     Catheter-related bloodstream infection (CRBSI)     Generalized weakness     S/P bariatric surgery     Hyperlipidemia LDL goal <130       PAST SURGICAL HISTORY:  Past Surgical History:   Procedure Laterality Date     AMPUTATION       APPENDECTOMY       BACK SURGERY  11/3/2014    curve in the spine     BIOPSY LYMPH NODE CERVICAL N/A 2/20/2015    Procedure: BIOPSY LYMPH NODE CERVICAL;  Surgeon: Baron Scanlon MD;  Location: [facility]     C GASTRIC BYPASS,OBESE<100CM SHAYLEE-EN-Y  2002    lost 300 pounds     CHOLECYSTECTOMY       DISCECTOMY, FUSION CERVICAL ANTERIOR ONE LEVEL, COMBINED N/A 2/15/2017    Procedure: COMBINED DISCECTOMY, FUSION CERVICAL ANTERIOR ONE LEVEL;  Surgeon: Darren Campos MD;  Location: [facility]     ENDOSCOPIC INSERTION TUBE GASTROSTOMY  9/9/2013    Procedure: ENDOSCOPIC INSERTION TUBE GASTROSTOMY;;  Surgeon: Francis Vyas MD;  [facility]     ENDOSCOPIC ULTRASOUND UPPER GASTROINTESTINAL TRACT (GI)  4/29/2011    Procedure:ENDOSCOPIC ULTRASOUND UPPER GASTROINTESTINAL TRACT (GI); Both Procedures done Conjointly; Surgeon:NEREIDA HOUSER; Location:[facility]     ENDOSCOPIC ULTRASOUND UPPER GASTROINTESTINAL TRACT (GI)  9/9/2013    Procedure: ENDOSCOPIC ULTRASOUND UPPER GASTROINTESTINAL TRACT (GI);  Endoscopic Ultrasound Guide Gastrostomy Tube Placement  C-arm;  Surgeon: Noe Lizarraga MD;  [facility]     ENDOSCOPY  03/25/11    EGD, [facility]     ENDOSCOPY  08/04/09    Upper Endoscopy, [facility]     ENDOSCOPY  01/05/09    Upper Endoscopy, [facility]     ESOPHAGOSCOPY, GASTROSCOPY, DUODENOSCOPY (EGD), COMBINED  4/20/2011    Procedure:COMBINED ESOPHAGOSCOPY, GASTROSCOPY, DUODENOSCOPY (EGD); Surgeon:FRANCIS VYAS; [facility]     ESOPHAGOSCOPY, GASTROSCOPY, DUODENOSCOPY (EGD), COMBINED  6/15/2011    Procedure:COMBINED ESOPHAGOSCOPY, GASTROSCOPY, DUODENOSCOPY (EGD); Surgeon:FRANCIS VYAS; [facility]      ESOPHAGOSCOPY, GASTROSCOPY, DUODENOSCOPY (EGD), COMBINED  6/12/2013    Procedure: COMBINED ESOPHAGOSCOPY, GASTROSCOPY, DUODENOSCOPY (EGD);;  Surgeon: Francis Vyas MD;  Location:  GI     ESOPHAGOSCOPY, GASTROSCOPY, DUODENOSCOPY (EGD), COMBINED  11/22/2013    Procedure: COMBINED ESOPHAGOSCOPY, GASTROSCOPY, DUODENOSCOPY (EGD);;  Surgeon: Francis Vyas MD;  Location:  OR     ESOPHAGOSCOPY, GASTROSCOPY, DUODENOSCOPY (EGD), COMBINED  4/30/2014    Procedure: COMBINED ESOPHAGOSCOPY, GASTROSCOPY, DUODENOSCOPY (EGD);  Surgeon: Francis Vyas MD;  Location:  GI     ESOPHAGOSCOPY, GASTROSCOPY, DUODENOSCOPY (EGD), COMBINED N/A 2/20/2015    Procedure: COMBINED ESOPHAGOSCOPY, GASTROSCOPY, DUODENOSCOPY (EGD), BIOPSY SINGLE OR MULTIPLE;  Surgeon: Baron Scanlon MD;  Location:  OR     ESOPHAGOSCOPY, GASTROSCOPY, DUODENOSCOPY (EGD), COMBINED N/A 9/30/2015    Procedure: COMBINED ESOPHAGOSCOPY, GASTROSCOPY, DUODENOSCOPY (EGD);  Surgeon: Francis Vyas MD;  Location:  GI     GASTRECTOMY  6/22/2012    Procedure: GASTRECTOMY;  Open Approach, Excise Ulcers,Partial Gastrectomy, Esophagojejunostomy, Hiatal Hernia Repair, Extensive Lysis of Adhesions and Esaphagogastrodudenoscopy.;  Surgeon: Francis Vyas MD;  Location:  OR     GASTROJEJUNOSTOMY  08/26/09    Extensice enterolysis, partial resect. jejunum, part. resect gastric pouch, gastrojejunostomy anastomosis     HC ESOPH/GAS REFLUX TEST W NASAL IMPED ELECTRODE  8/5/2013    Procedure: ESOPHAGEAL IMPEDENCE FUNCTION TEST 1 HOUR OR LESS;  Surgeon: Halie Lang MD;  Location:  GI     HEAD & NECK SURGERY  2/15/2017    C5-C6     HERNIA REPAIR  2006    Umbilical hernia     HERNIORRHAPHY HIATAL  6/22/2012    Procedure: HERNIORRHAPHY HIATAL;;  Surgeon: Francis Vyas MD;  [facility]     HERNIORRHAPHY INGUINAL  11/22/2013    Procedure: HERNIORRHAPHY INGUINAL;;  Surgeon: Francis Vyas MD;  [facility]     INSERT  PORT VASCULAR ACCESS Right 12/19/2017    Procedure: INSERT PORT VASCULAR ACCESS;  Right Chest Port Placement ;  Surgeon: Lisandro Alejandro PA-C;  [facility]     INSERT PORT VASCULAR ACCESS Right 8/2/2018    Procedure: INSERT PORT VASCULAR ACCESS;  Place single lumen tunneled central venous access catheter;  Surgeon: Guy Jamil PA-C;  [facility]     LAPAROTOMY EXPLORATORY  11/22/2013    Procedure: LAPAROTOMY EXPLORATORY;  Exploratory Laparotomy, Upper Endoscopy, Left Inguinal Hernia Repair;  Surgeon: Francis Vyas MD;  [facility]     ORTHOPEDIC SURGERY       PICC INSERTION Right 03/16/2017    5fr DL BioFlo PICC, 42cm (3cm external) in the R medial brachial vein w/ tip in the SVC RA junction.     PICC INSERTION Left 09/23/2017    5fr DL BioFlo PICC, 45cm (1cm external) in the L basilic vein w/ tip in the SVC RA junction.     SHAYLEE EN Y BOWEL  2003     SOFT TISSUE SURGERY       SOFT TISSUE SURGERY       THORACIC SURGERY       TONSILLECTOMY          MEDICATIONS:  PTA Meds  Prior to Admission medications    Medication Sig Last Dose Taking? Auth Provider   acetaminophen (TYLENOL) 500 MG tablet Take 1,000 mg by mouth every 6 hours as needed for fever   Unknown, Entered By History   albuterol (PROAIR HFA/PROVENTIL HFA/VENTOLIN HFA) 108 (90 Base) MCG/ACT Inhaler Inhale 2 puffs into the lungs every 4 hours as needed for shortness of breath / dyspnea or wheezing   Esteban Dayl MD   amphetamine-dextroamphetamine (ADDERALL) 20 MG per tablet Take 1 tablet (20 mg) by mouth daily   Esteban Daly MD   carvedilol (COREG) 12.5 MG tablet Take 12.5 mg by mouth 2 times daily (with meals)   Unknown, Entered By History   cyanocobalamin (VITAMIN B12) 1000 MCG/ML injection Inject 1 mL (1,000 mcg) into the muscle every 30 days   Esteban Daly MD   [facility] INFUSION MANAGED PATIENT Contact [facility] for patient specific medication information at [phone]  "on admission and discharge from the hospital.  Phones are answered 24 hours a day 7 days a week 365 days a year.    Providers - Choose \"CONTINUE HOME MED (no script)\" at discharge if patient treatment with home infusion will continue.   Ilsa Shine PA   fentaNYL (DURAGESIC) 25 mcg/hr 72 hr patch Place 1 patch onto the skin every 48 hours remove old patch.  Release on/after 10/6/18 to start on/after 10/8/18.   Patti Milligan MD   lidocaine (LIDODERM) 5 % Patch Place 1-2 patches onto the skin every 24 hours Wear for 12 hours, remove for 12 hours.  OK to cut to better fit to size.   Patti Milligan MD   lidocaine-prilocaine (EMLA) cream Apply topically as needed for moderate pain   Esteban Daly MD   LORazepam (ATIVAN) 1 MG tablet Take 1 tablet (1 mg) by mouth nightly as needed for anxiety or other (sleep)   Esteban Daly MD   Needle, Disp, (BD DISP NEEDLES) 27G X 1/2\" MISC 1 Device every 30 days Use for cyanocobalamin injection once q 30 days.   Esteban Daly MD   ondansetron (ZOFRAN-ODT) 8 MG ODT tab Take 1 tablet (8 mg) by mouth every 8 hours as needed for nausea   Esteban Daly MD   order for DME Equipment being ordered: Bilateral knee high chronic venous insufficiency stockings--  mild-moderate pressures.   Esteban Daly MD   order for DME Injection Supplies for Vitamin B12: 3cc syringes w/ 27 gauge needles, 1/2 inch length   Anita Méndez PA-C   oxyCODONE (ROXICODONE) 5 MG/5ML solution Take 10-15 mLs (10-15 mg) by mouth every 4 hours as needed for moderate to severe pain Max of 45 mg/day this month. Weaning dose as discussed. Fill on/after 10/6/18 not to start untill 10/8/18.   Patti Milligan MD   parenteral nutrition - PTA/DISCHARGE ORDER [facility] to resume previous home TPN but run x 24 hours the first day until labs are ok. Then cycle x 14 hours per previous home regimen. He has been off TPN x 5 days here.   Leana Rosales " DENNIS Plascencia   sucralfate (CARAFATE) 1 GM/10ML suspension Take 10 mLs (1 g) by mouth 4 times daily  Patient taking differently: Take 1 g by mouth every 4 hours as needed (EPIGASTRIC PAIN, BILE BACKUP)    Esteban Daly MD   vitamin D (ERGOCALCIFEROL) 10365 UNIT capsule Take 1 capsule (50,000 Units) by mouth every 7 days  Patient taking differently: Take 50,000 Units by mouth every 7 days On sundays   Esteban Daly MD        ALLERGIES:    Allergies   Allergen Reactions     Bactrim [Sulfamethoxazole W/Trimethoprim] Rash     Penicillins Anaphylaxis     Doxycycline Rash     Vancomycin Rash     Rash after receiving vancomycin 3/28/16 (red man's?). Tolerated with slower infusion and diphenhydramine premed.       SOCIAL HISTORY:   Social History     Social History     Marital status:      Spouse name: Rose     Number of children: 1     Years of education: N/A     Occupational History     Not on file.     Social History Main Topics     Smoking status: Current Some Day Smoker     Packs/day: 0.25     Years: 3.00     Types: Cigarettes     Last attempt to quit: 7/28/2018     Smokeless tobacco: Never Used      Comment: I use an e cig every now and than     Alcohol use No      Comment: quit 2002     Drug use: No     Sexual activity: Yes     Partners: Female     Birth control/ protection: None      Comment: no protection     Other Topics Concern     Parent/Sibling W/ Cabg, Mi Or Angioplasty Before 65f 55m? No     Social History Narrative       FAMILY MEDICAL HISTORY:   Family History   Problem Relation Age of Onset     GASTROINTESTINAL DISEASE Mother      Crohns disease     Anxiety Disorder Mother      Thyroid Disease Mother      Grave's disease     Cancer Father      ear cancer-skin cancer/melanoma     Breast Cancer Maternal Grandmother      Anxiety Disorder Sister      Diabetes Maternal Uncle      Breast Cancer Other      Hypertension No family hx of      Hyperlipidemia No family hx of      Cerebrovascular  "Disease No family hx of      Prostate Cancer No family hx of      Depression No family hx of      Anesthesia Reaction No family hx of      Asthma No family hx of      Osteoporosis No family hx of      Genetic Disorder No family hx of      Obesity No family hx of      Mental Illness No family hx of      Substance Abuse No family hx of        PHYSICAL EXAM:   /81  Pulse 72  Temp 98.6  F (37  C) (Oral)  Resp 16  Ht 1.816 m (5' 11.5\")  Wt 92.1 kg (203 lb 1.6 oz)  SpO2 98%  BMI 27.93 kg/m2   No intake or output data in the 24 hours ending 10/10/18 0412    GEN: A&Ox3, in NAD, pleasant, cooperative   HEENT: AT/NC, PERRLA, MMM,  CV: RRR no m/r/g  LUNGS: CTAB, no increased work of breathing  ABD: +BS, soft, NT/ND, G-tube C/D/I  EXT: Normal muscle bulk and tone  SKIN: w/d/i  NEURO: no focal deficits appreciated, no tremor noted      LABS:   CMP  Recent Labs  Lab 10/10/18  0038 10/08/18  1500 10/03/18  1941    142 145*   POTASSIUM 3.6 3.6 3.6   CHLORIDE 106 105 111*   CO2 29 30 26   ANIONGAP 9 7 8   * 79 87   BUN 14 13 7   CR 0.76 0.82 0.75   GFRESTIMATED >90 >90 >90   GFRESTBLACK >90 >90 >90   SILAS 8.1* 8.0* 8.2*   MAG  --  2.0  --    PHOS  --  2.9  --    PROTTOTAL 6.6* 6.7*  --    ALBUMIN 3.2* 3.5  --    BILITOTAL 0.3 0.3  --    ALKPHOS 79 90  --    AST 15 15  --    ALT 21 26  --      CBC  Recent Labs  Lab 10/10/18  0038 10/08/18  1500 10/03/18  1941   HGB 12.0* 12.3* 13.0*   WBC 5.5 6.0 9.0   RBC 3.87* 4.02* 4.15*   HCT 37.3* 38.6* 38.8*   MCV 96 96 94   MCH 31.0 30.6 31.3   MCHC 32.2 31.9 33.5   RDW 13.5 13.6 12.9   * 121* 168     INRNo lab results found in last 7 days.  ABGNo lab results found in last 7 days.   URINE STUDIES  Recent Labs   Lab Test  10/10/18   0100  09/11/18   2345  06/01/18   2021  01/12/18   0042  09/19/17   0242  08/01/17   1133   08/14/12   1512   06/27/12   1945   COLOR  Yellow  Yellow  Light Yellow  Yellow  Yellow  Yellow   < >  Yellow   < >  Yellow   APPEARANCE  " Clear  Clear  Clear  Clear  Clear  Clear   < >  Clear   < >  Clear   URINEGLC  Negative  Negative  Negative  Negative  Negative  Negative   < >  Negative   < >  Negative   URINEBILI  Negative  Negative  Negative  Negative  Negative  Negative   < >  Negative   < >  Negative   URINEKETONE  Negative  Negative  Negative  5*  Negative  Negative   < >  Negative   < >  40*   SG  1.015  1.006  1.006  1.016  1.018  1.015   < >  1.020   < >  >1.030   UBLD  Small*  Negative  Small*  Small*  Trace*  Trace*   < >  Negative   < >  Large*   URINEPH  7.0  7.0  8.0*  6.5  5.5  6.5   < >  7.0   < >  5.5   PROTEIN  Negative  Negative  Negative  10*  Negative  Negative   < >  Negative   < >  100*   UROBILINOGEN   --    --    --    --    --   0.2   --   0.2   --   1.0   NITRITE  Negative  Negative  Negative  Negative  Negative  Negative   < >  Negative   < >  Negative   LEUKEST  Negative  Negative  Negative  Negative  Negative  Negative   < >  Negative   < >  Negative   RBCU  16*   --   <1  39*  2  O - 2   < >   --    < >  10-25*   WBCU  1   --   <1  <1  2  O - 2   < >   --    < >  2-5*    < > = values in this interval not displayed.     No lab results found.    IRON STUDIES  Recent Labs   Lab Test  08/20/18   1442  02/16/18   1515  02/12/18   1400  08/13/17   1900  02/10/17   1520  08/09/16   1626  02/22/16   1115  06/18/15   1049  01/06/15   1325  11/03/14   1355  05/20/14   1252  02/25/14   1003  07/23/13   1036  05/02/13   1150  12/12/12   1423  10/17/12   1113  09/18/12   1017  08/15/12   0915  05/15/12   1122  12/13/11   0938  10/04/11   1002   IRON  72  65  87  22*  50  50  65   --   87   --   69   --    --    --    --    --    --   <10*   --    --    --    FEB   --    --    --    --    --    --    --    --   281   --   329   --    --    --    --    --    --    --    --    --    --    IRONSAT   --    --    --    --    --    --    --    --   31   --   21   --    --    --    --    --    --    --    --    --    --    BROOKLYNN  66  143   111  10*  33  51  132  190   --   55  19*  10*  4*  5*  6*  7*  7*  23  17*  10*  14*       IMAGING:  All imaging studies reviewed by me.

## 2018-10-10 NOTE — PROGRESS NOTES
"[facility], [place]   Allergy Assessment and Penicillin Allergy Skin Test (PAST)   Antimicrobial Management Team (AMT) Note                Allergy History:   Question Response   What was the name of agent which caused the event?  Penicillin   When did the reaction occur?  The patient reports the reaction occurring between the ages of 10 and 12 years old   What was the nature of the event? Including symptoms and severity  (rash, hives, anaphylaxis, SOB, hospitalization) The patient reports anaphylaxis like symptoms including rash, hives, shortness of breath, and \"throat closing\"   What was the course of the reaction?   The patient could not provide details.   How long after taking the medication did it take for the symptoms to begin?  The patient reports it was 30 to 45 minutes before onset of reaction    How was the reaction treated?   (Antihistamines, steroids, inhalers, etc)  The patient does not recall what he was treated with, but he recalls presenting to a family medical office   Did you ever experience symptoms like this before?  No   Can you name other antibiotics you remember taking and tolerated?  The patient endorses taking amoxicillin with no reaction sometime in the past 10 years   Have you ever taken drugs similar to penicillin?   Cephalosporins such as Cephalexin (Keflex), Cefepime (Maxipime), Ceftriaxone (Rocephin)?Carbapenems such as Imipenem (Primaxin), Meropenem (Merrem), Etrapenem (Invanz)    Besides amoxicillin, the patient did not recognize any of the medications on this list, however in our system, the patient has received ceftriaxone and cefazolin.     Discussed with ID Staff - Yaquelin CarrenoD  Jesi Medrano, W[employer] Student       "

## 2018-10-10 NOTE — PHARMACY-VANCOMYCIN DOSING SERVICE
Pharmacy Vancomycin Initial Note  Date of Service October 10, 2018  Patient's  1970  47 year old, male    Indication: Bacteremia    Current estimated CrCl = Estimated Creatinine Clearance: 143.6 mL/min (based on Cr of 0.76).    Creatinine for last 3 days  10/8/2018:  3:00 PM Creatinine 0.82 mg/dL  10/10/2018: 12:38 AM Creatinine 0.76 mg/dL    Recent Vancomycin Level(s) for last 3 days  No results found for requested labs within last 72 hours.      Vancomycin IV Administrations (past 72 hours)      No vancomycin orders with administrations in past 72 hours.                Nephrotoxins and other renal medications (Future)    Start     Dose/Rate Route Frequency Ordered Stop    10/10/18 1400  vancomycin (VANCOCIN) 2,000 mg in sodium chloride 0.9 % 500 mL intermittent infusion      2,000 mg  over 4 Hours Intravenous EVERY 12 HOURS 10/10/18 0131      10/10/18 0130  vancomycin (VANCOCIN) 2,000 mg in sodium chloride 0.9 % 500 mL intermittent infusion      2,000 mg  over 4 Hours Intravenous ONCE 10/10/18 0129            Contrast Orders - past 72 hours     None                Plan:  1.  Start vancomycin  2000 mg IV q12h.   2.  Goal Trough Level: 15-20 mg/L   3.  Pharmacy will check trough levels as appropriate in 1-3 Days.    4. Serum creatinine levels will be ordered daily for the first week of therapy and at least twice weekly for subsequent weeks.    5. [place] method utilized to dose vancomycin therapy: Method 2    Eduardo Hernández, PharmD

## 2018-10-10 NOTE — CONSULTS
GASTROENTEROLOGY CONSULTATION      Date of Admission: 10/9/2018       Date of Consult: [unfilled]          Chief Complaint:   We were asked by JEFRY Dickerson MD to evaluate this patient with fever and positive blood cultures.         ASSESSMENT AND RECOMMENDATIONS:   Assessment:  Parker Acevedo Sr is a 47 year old male with a history of Celestino-en-Y gastric bypass (2002) followed by esophagojejunostomy (course complicated by GJ ulcers resistant to medical management and adhesions requiring revision surgeries and bowel resections), chronic malnutrition on TPN (16 hours/day, secondary to poor PO intake, failed prior enteral feeding trials), chronic abdominal pain likely secondary to adhesions (with G-tube for venting), dysphagia, esophagitis (2014), hiatal hernia, recurrent episodes of bacteremia (central line related) and cardiomyopathy, who reports a recent onset of fever and generalized body rash. Of note, he had a Cm catheter exchanged on 10/2. Blood cultures were taken at time of fever and he received a call yesterday and directed to the ED because of positive blood cultures. We were asked to evaluate for possible GI causes of his bacteremia.   Cultures from 8/10 are positive for GPC in clusters, pairs and chains. Although patient reported chronic abdominal pain, constipation and scanty blood output via G-tube, he did not notice recent changes in his GI symptoms. Furthermore, clinical evaluation did not reveal concerning features for intra-abdominal infection. Likely source of his bacteremia per ID evaluation is central catheter, though GI source can not be rolled out.  Considering the complexity of his surgical GI anatomy, evaluation of his GI tract should base on solid clinical data. So far, and from the available surgical notes, it seems like the patient's gastric remnant is not in direct communication with the esophagojejunal limb and thus this area is not accessible via an EGD. Endoscopic evaluation  through the G-tube could be considered with surgical collaboration only if clinical data are supportive of a gastric source of his bacteremia (very unlikely). Colonoscopy is also limited by patient's ability to have bowel preparation (consdering history of chronic PO intolerance and dumping symptoms in the past).   Recommendations  - Continue monitoring for fevers, follow with blood cultures.  - Agree with antibiotics, appreciate ID recommendations. Noted plans for Cm removal pending final blood cultures.   - Daily CBC, CMP.   - Will consider further evaluation (EGD/Colonsocpy) when other sources are rolled out.   - Start PPI 40 mg daily.   - Discussed with ID resident, patient and family.    Gastroenterology follow up recommendations: TBD      Patient care plan discussed with Dr. Caballero, GI staff physician. Thank you for involving us in this patient's care. Please do not hesitate to contact the GI service with any questions or concerns.     Lurdes Cruz  GI Fellow  P: [phone]  -------------------------------------------------------------------------------------------------------------------   History is obtained from the patient and the medical record.          History of Present Illness:   Parker Acevedo Sr is a 47 year old male with a history of Celestino-en-Y gastric bypass (2002), history revision surgeries and bowel resections (last in 2008), malnutrition on TPN (16 hours/day), chronic abdominal pain (with G-tube for venting), esophagitis ([id number]), hiatal hernia, recurrent episodes of bacteremia (central line related) and cardiomyopathy, who recently (10/2) had a Cm catheter exchanged. Patient reports a fever of 104.2 with onset of new generalized skin rash two days ago and had blood culture drawn at that time. Patient received a call yesterday and directed to the ED because of positive blood cultures. Denied changes in severity of chronic abdominal pain, BM changes (usually 1-2 every 6-8  weeks), denied persistent N/V. Known to have scanty bloody output from G-tube for a long time.             Past Medical History:   Reviewed and edited as appropriate  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)      Chronic abdominal pain      Complication of anesthesia      Difficulty swallowing      Gastric ulcer, unspecified as acute or chronic, without mention of hemorrhage, perforation, or obstruction      Gastro-oesophageal reflux disease      Generalized weakness 1/30/2018     Head injury      Hiatal hernia      Other bladder disorder      Other chronic pain      PONV (postoperative nausea and vomiting)      Severe malnutrition (H)     TPN     Short gut syndrome      Tobacco abuse             Past Surgical History:   Reviewed and edited as appropriate   Past Surgical History:   Procedure Laterality Date     AMPUTATION       APPENDECTOMY       BACK SURGERY  11/3/2014    curve in the spine     BIOPSY LYMPH NODE CERVICAL N/A 2/20/2015    Procedure: BIOPSY LYMPH NODE CERVICAL;  Surgeon: Baron Scanlon MD;  Location: [facility]     C GASTRIC BYPASS,OBESE<100CM SHAYLEE-EN-Y  2002    lost 300 pounds     CHOLECYSTECTOMY       DISCECTOMY, FUSION CERVICAL ANTERIOR ONE LEVEL, COMBINED N/A 2/15/2017    Procedure: COMBINED DISCECTOMY, FUSION CERVICAL ANTERIOR ONE LEVEL;  Surgeon: Darren Campos MD;  Location: [facility]     ENDOSCOPIC INSERTION TUBE GASTROSTOMY  9/9/2013    Procedure: ENDOSCOPIC INSERTION TUBE GASTROSTOMY;;  Surgeon: Francis Vyas MD;  [facility]     ENDOSCOPIC ULTRASOUND UPPER GASTROINTESTINAL TRACT (GI)  4/29/2011    Procedure:ENDOSCOPIC ULTRASOUND UPPER GASTROINTESTINAL TRACT (GI); Both Procedures done Conjointly; Surgeon:NEREIDA HOUSER; [facility]     ENDOSCOPIC ULTRASOUND  UPPER GASTROINTESTINAL TRACT (GI)  9/9/2013    Procedure: ENDOSCOPIC ULTRASOUND UPPER GASTROINTESTINAL TRACT (GI);  Endoscopic Ultrasound Guide Gastrostomy Tube Placement  C-arm;  Surgeon: Noe Lizarraga MD;  [facility]     ENDOSCOPY  03/25/11    EGD, [facility]     ENDOSCOPY  08/04/09    Upper Endoscopy, [facility]     ENDOSCOPY  01/05/09    Upper Endoscopy, [facility]     ESOPHAGOSCOPY, GASTROSCOPY, DUODENOSCOPY (EGD), COMBINED  4/20/2011    Procedure:COMBINED ESOPHAGOSCOPY, GASTROSCOPY, DUODENOSCOPY (EGD); Surgeon:BLU VEGA; [facility]     ESOPHAGOSCOPY, GASTROSCOPY, DUODENOSCOPY (EGD), COMBINED  6/15/2011    Procedure:COMBINED ESOPHAGOSCOPY, GASTROSCOPY, DUODENOSCOPY (EGD); Surgeon:BLU VEGA; [facility]     ESOPHAGOSCOPY, GASTROSCOPY, DUODENOSCOPY (EGD), COMBINED  6/12/2013    Procedure: COMBINED ESOPHAGOSCOPY, GASTROSCOPY, DUODENOSCOPY (EGD);;  Surgeon: Blu Vega MD;  [facility]     ESOPHAGOSCOPY, GASTROSCOPY, DUODENOSCOPY (EGD), COMBINED  11/22/2013    Procedure: COMBINED ESOPHAGOSCOPY, GASTROSCOPY, DUODENOSCOPY (EGD);;  Surgeon: Blu Vega MD;  [facility]     ESOPHAGOSCOPY, GASTROSCOPY, DUODENOSCOPY (EGD), COMBINED  4/30/2014    Procedure: COMBINED ESOPHAGOSCOPY, GASTROSCOPY, DUODENOSCOPY (EGD);  Surgeon: Blu Vega MD;  [facility]     ESOPHAGOSCOPY, GASTROSCOPY, DUODENOSCOPY (EGD), COMBINED N/A 2/20/2015    Procedure: COMBINED ESOPHAGOSCOPY, GASTROSCOPY, DUODENOSCOPY (EGD), BIOPSY SINGLE OR MULTIPLE;  Surgeon: Baron Scanlon MD;  Location:  OR     ESOPHAGOSCOPY, GASTROSCOPY, DUODENOSCOPY (EGD), COMBINED N/A 9/30/2015    Procedure: COMBINED ESOPHAGOSCOPY, GASTROSCOPY, DUODENOSCOPY (EGD);  Surgeon: Blu Vega MD;  [facility]     GASTRECTOMY  6/22/2012    Procedure: GASTRECTOMY;  Open Approach, Excise Ulcers,Partial Gastrectomy, Esophagojejunostomy, Hiatal Hernia Repair,  Extensive Lysis of Adhesions and Esaphagogastrodudenoscopy.;  Surgeon: Francis Vyas MD;  [facility]     GASTROJEJUNOSTOMY  08/26/09    Extensice enterolysis, partial resect. jejunum, part. resect gastric pouch, gastrojejunostomy anastomosis     HC ESOPH/GAS REFLUX TEST W NASAL IMPED ELECTRODE  8/5/2013    Procedure: ESOPHAGEAL IMPEDENCE FUNCTION TEST 1 HOUR OR LESS;  Surgeon: Halie Lang MD;  Location:  GI     HEAD & NECK SURGERY  2/15/2017    C5-C6     HERNIA REPAIR  2006    Umbilical hernia     HERNIORRHAPHY HIATAL  6/22/2012    Procedure: HERNIORRHAPHY HIATAL;;  Surgeon: Francis yVas MD;  Location:  OR     HERNIORRHAPHY INGUINAL  11/22/2013    Procedure: HERNIORRHAPHY INGUINAL;;  Surgeon: Francis Vyas MD;  [facility]     INSERT PORT VASCULAR ACCESS Right 12/19/2017    Procedure: INSERT PORT VASCULAR ACCESS;  Right Chest Port Placement ;  Surgeon: Lisandro Alejandro PA-C;  Location:  OR     INSERT PORT VASCULAR ACCESS Right 8/2/2018    Procedure: INSERT PORT VASCULAR ACCESS;  Place single lumen tunneled central venous access catheter;  Surgeon: Guy Jamil PA-C;  [facility]     LAPAROTOMY EXPLORATORY  11/22/2013    Procedure: LAPAROTOMY EXPLORATORY;  Exploratory Laparotomy, Upper Endoscopy, Left Inguinal Hernia Repair;  Surgeon: Francis Vyas MD;  Location:  OR     ORTHOPEDIC SURGERY       PICC INSERTION Right 03/16/2017    5fr DL BioFlo PICC, 42cm (3cm external) in the R medial brachial vein w/ tip in the SVC RA junction.     PICC INSERTION Left 09/23/2017    5fr DL BioFlo PICC, 45cm (1cm external) in the L basilic vein w/ tip in the SVC RA junction.     SHAYLEE EN Y BOWEL  2003     SOFT TISSUE SURGERY       SOFT TISSUE SURGERY       THORACIC SURGERY       TONSILLECTOMY              Previous Endoscopy:   No results found. However, due to the size of the patient record, not all encounters were searched. Please check Results Review  for a complete set of results.         Social History:   Reviewed and edited as appropriate  Social History     Social History     Marital status:      Spouse name: Rose     Number of children: 1     Years of education: N/A     Occupational History     Not on file.     Social History Main Topics     Smoking status: Current Some Day Smoker     Packs/day: 0.25     Years: 3.00     Types: Cigarettes     Last attempt to quit: 7/28/2018     Smokeless tobacco: Never Used      Comment: I use an e cig every now and than     Alcohol use No      Comment: quit 2002     Drug use: No     Sexual activity: Yes     Partners: Female     Birth control/ protection: None      Comment: no protection     Other Topics Concern     Parent/Sibling W/ Cabg, Mi Or Angioplasty Before 65f 55m? No     Social History Narrative            Family History:   Reviewed and edited as appropriate  Family History   Problem Relation Age of Onset     GASTROINTESTINAL DISEASE Mother      Crohns disease     Anxiety Disorder Mother      Thyroid Disease Mother      Grave's disease     Cancer Father      ear cancer-skin cancer/melanoma     Breast Cancer Maternal Grandmother      Anxiety Disorder Sister      Diabetes Maternal Uncle      Breast Cancer Other      Hypertension No family hx of      Hyperlipidemia No family hx of      Cerebrovascular Disease No family hx of      Prostate Cancer No family hx of      Depression No family hx of      Anesthesia Reaction No family hx of      Asthma No family hx of      Osteoporosis No family hx of      Genetic Disorder No family hx of      Obesity No family hx of      Mental Illness No family hx of      Substance Abuse No family hx of            Allergies:   Reviewed and edited as appropriate     Allergies   Allergen Reactions     Bactrim [Sulfamethoxazole W/Trimethoprim] Rash     Penicillins Anaphylaxis     Doxycycline Rash     Vancomycin Rash     Rash after receiving vancomycin 3/28/16 (red man's?). Tolerated  with slower infusion and diphenhydramine premed.            Medications:     Current Facility-Administered Medications   Medication     acetaminophen (TYLENOL) tablet 1,000 mg     albuterol (PROAIR HFA/PROVENTIL HFA/VENTOLIN HFA) 108 (90 Base) MCG/ACT inhaler 2 puff     amphetamine-dextroamphetamine (ADDERALL) per tablet 20 mg     carvedilol (COREG) tablet 6.25 mg     cefTRIAXone (ROCEPHIN) 1 g vial to attach to  mL bag for ADULTS or NS 50 mL bag for PEDS     diphenhydrAMINE (BENADRYL) capsule 25 mg    Or     diphenhydrAMINE (BENADRYL) injection 25 mg     fentaNYL (DURAGESIC) 25 mcg/hr 72 hr patch 1 patch     fentaNYL (DURAGESIC) Patch in Place     [START ON 10/12/2018] fentaNYL (DURAGESIC) patch REMOVAL     Lidocaine (LIDOCARE) 4 % Patch 1-2 patch     lidocaine (viscous) (XYLOCAINE) 2 % 15 mL, alum & mag hydroxide-simethicone (MYLANTA ES/MAALOX  ES) 15 mL GI Cocktail     lidocaine patch in PLACE     lidocaine patch REMOVAL     LORazepam (ATIVAN) tablet 1 mg     melatonin tablet 1 mg     metroNIDAZOLE (FLAGYL) infusion 500 mg     naloxone (NARCAN) injection 0.1-0.4 mg     ondansetron (ZOFRAN-ODT) ODT tab 8 mg     oxyCODONE (ROXICODONE) solution 10-15 mg     sucralfate (CARAFATE) suspension 1 g     vancomycin (VANCOCIN) 2,000 mg in sodium chloride 0.9 % 500 mL intermittent infusion     [START ON 10/14/2018] vitamin D (ERGOCALCIFEROL) capsule 50,000 Units     Current Outpatient Prescriptions   Medication Sig     amphetamine-dextroamphetamine (ADDERALL) 20 MG per tablet Take 1 tablet (20 mg) by mouth daily     carvedilol (COREG) 6.25 MG tablet Take 6.25 mg by mouth 2 times daily (with meals)     cyanocobalamin (VITAMIN B12) 1000 MCG/ML injection Inject 1 mL (1,000 mcg) into the muscle every 30 days     fentaNYL (DURAGESIC) 25 mcg/hr 72 hr patch Place 1 patch onto the skin every 48 hours remove old patch.  Release on/after 10/6/18 to start on/after 10/8/18.     lidocaine (LIDODERM) 5 % Patch Place 1-2 patches onto  "the skin every 24 hours Wear for 12 hours, remove for 12 hours.  OK to cut to better fit to size.     lidocaine-prilocaine (EMLA) cream Apply topically as needed for moderate pain     LORazepam (ATIVAN) 1 MG tablet Take 1 tablet (1 mg) by mouth nightly as needed for anxiety or other (sleep)     ondansetron (ZOFRAN-ODT) 8 MG ODT tab Take 1 tablet (8 mg) by mouth every 8 hours as needed for nausea     oxyCODONE (ROXICODONE) 5 MG/5ML solution Take 10-15 mLs (10-15 mg) by mouth every 4 hours as needed for moderate to severe pain Max of 45 mg/day this month. Weaning dose as discussed. Fill on/after 10/6/18 not to start untill 10/8/18.     parenteral nutrition - PTA/DISCHARGE ORDER TPN+lipid Formula per [facility] 10/10/18: volume 1800 mL; AA 95g/d; Dex 235g/d; Intralipid 20% 350 mL; sodium 30 mEq/d; potassium 100 mEq/d; magnesium 16 mEq/d; phosphate 50 mMol/d; infuvite adult 10 mL/d; MTE-5 3mL/d; Cl:Ace 1:2; Infuse intravenously over 14 hours Monday-Friday.    Plain TPN Formula per [facility] 10/10/18: volume 2150 mL; all other ingredients identical to lipid formula. Infuse intravenously over 14 hours Saturday and Sunday.     sodium chloride 0.9% infusion Inject 1,000-2,000 mLs into the vein as needed      vitamin D (ERGOCALCIFEROL) 00733 UNIT capsule Take 1 capsule (50,000 Units) by mouth every 7 days (Patient taking differently: Take 50,000 Units by mouth every 7 days On sundays)     albuterol (PROAIR HFA/PROVENTIL HFA/VENTOLIN HFA) 108 (90 Base) MCG/ACT Inhaler Inhale 2 puffs into the lungs every 4 hours as needed for shortness of breath / dyspnea or wheezing     [facility] INFUSION MANAGED PATIENT Contact [facility] for patient specific medication information at [phone] on admission and discharge from the hospital.  Phones are answered 24 hours a day 7 days a week 365 days a year.    Providers - Choose \"CONTINUE HOME MED (no script)\" at discharge if patient treatment with home infusion will continue.     " "Needle, Disp, (BD DISP NEEDLES) 27G X 1/2\" MISC 1 Device every 30 days Use for cyanocobalamin injection once q 30 days.     order for DME Equipment being ordered: Bilateral knee high chronic venous insufficiency stockings--  mild-moderate pressures.     order for DME Injection Supplies for Vitamin B12: 3cc syringes w/ 27 gauge needles, 1/2 inch length     sucralfate (CARAFATE) 1 GM/10ML suspension Take 10 mLs (1 g) by mouth 4 times daily (Patient taking differently: Take 1 g by mouth every 4 hours as needed (EPIGASTRIC PAIN, BILE BACKUP) )     [DISCONTINUED] NO ACTIVE MEDICATIONS .             Review of Systems:   A complete review of systems was performed and is negative except as noted in the HPI           Physical Exam:   /82  Pulse 66  Temp 99.4  F (37.4  C) (Oral)  Resp 17  Ht 1.816 m (5' 11.5\")  Wt 92.1 kg (203 lb 1.6 oz)  SpO2 97%  BMI 27.93 kg/m2  Wt:   Wt Readings from Last 2 Encounters:   10/09/18 92.1 kg (203 lb 1.6 oz)   10/05/18 86.6 kg (191 lb)      Constitutional: cooperative, pleasant, not dyspneic/diaphoretic, no acute distress  Eyes: Sclera anicteric/injected  Ears/nose/mouth/throat: Normal oropharynx without ulcers or exudate, mucus membranes moist, hearing intact  Neck: supple, thyroid normal size  CV: No edema  Respiratory: Unlabored breathing  Lymph: No axillary, submandibular, supraclavicular or inguinal lymphadenopathy  Abd:  Nondistended, +bs, no hepatosplenomegaly, nontender, no peritoneal signs. Noted G-tube.  Skin: warm, perfused, no jaundice  Neuro: AAO x 3, No asterixis  Psych: Normal affect  MSK: Normal gait         Data:   Labs and imaging below were independently reviewed and interpreted    BMP  Recent Labs  Lab 10/10/18  0038 10/08/18  1500 10/03/18  1941    142 145*   POTASSIUM 3.6 3.6 3.6   CHLORIDE 106 105 111*   SILAS 8.1* 8.0* 8.2*   CO2 29 30 26   BUN 14 13 7   CR 0.76 0.82 0.75   * 79 87     CBC  Recent Labs  Lab 10/10/18  0038 10/08/18  1500 " 10/03/18  1941   WBC 5.5 6.0 9.0   RBC 3.87* 4.02* 4.15*   HGB 12.0* 12.3* 13.0*   HCT 37.3* 38.6* 38.8*   MCV 96 96 94   MCH 31.0 30.6 31.3   MCHC 32.2 31.9 33.5   RDW 13.5 13.6 12.9   * 121* 168     INRNo lab results found in last 7 days.  LFTs  Recent Labs  Lab 10/10/18  0038 10/08/18  1500   ALKPHOS 79 90   AST 15 15   ALT 21 26   BILITOTAL 0.3 0.3   PROTTOTAL 6.6* 6.7*   ALBUMIN 3.2* 3.5      PANCNo lab results found in last 7 days.    Imaging: reviewed from prior.

## 2018-10-10 NOTE — PROGRESS NOTES
Clinic Care Coordination Contact    Situation: Patient chart reviewed by care coordinator.    Background: RN CC received notification of ED visit.    Assessment: Patient is currently in the ED awaiting admission to medical bed.    Plan/Recommendations: RN CC will monitor for patient disposition.    Melissa Behl BSN, RN, [facility] Care Coordinator  [phone]

## 2018-10-10 NOTE — ED NOTES
Pt requested to go outside to smoke a cigarette. RN offered pt nicotine, he denied. He went outside with his spouse to smoke. Returned back to room at 0810

## 2018-10-10 NOTE — ED NOTES
[facility], [place]   ED Nurse to Floor Handoff     Parker Acevedo Sr is a 47 year old male who speaks English and lives with a spouse,  in a home  They arrived in the ED by car from home    ED Chief Complaint: Blood Infection    ED Dx;   Final diagnoses:   Recurrent bacteremia   On total parenteral nutrition   Other chronic pain         Needed?: No    Allergies:   Allergies   Allergen Reactions     Bactrim [Sulfamethoxazole W/Trimethoprim] Rash     Penicillins Anaphylaxis     Doxycycline Rash     Vancomycin Rash     Rash after receiving vancomycin 3/28/16 (red man's?). Tolerated with slower infusion and diphenhydramine premed.   .  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)      Chronic abdominal pain      Complication of anesthesia      Difficulty swallowing      Gastric ulcer, unspecified as acute or chronic, without mention of hemorrhage, perforation, or obstruction      Gastro-oesophageal reflux disease      Generalized weakness 1/30/2018     Head injury      Hiatal hernia      Other bladder disorder      Other chronic pain      PONV (postoperative nausea and vomiting)      Severe malnutrition (H)     TPN     Short gut syndrome      Tobacco abuse       Baseline Mental status: WDL  Current Mental Status changes: at basesline    Infection present or suspected this encounter: yes other blood infection-line infection  Sepsis suspected: No  Isolation type: Contact     Activity level - Baseline/Home:  Independent  Activity Level - Current:   Independent    Bariatric equipment needed?: No    In the ED these meds were given:   Medications   vancomycin (VANCOCIN) 2,000 mg in sodium chloride 0.9 % 500 mL intermittent infusion (2,000 mg  Intravenous New Bag 10/10/18 1412)   acetaminophen (TYLENOL) tablet 1,000 mg (not administered)   albuterol (PROAIR HFA/PROVENTIL HFA/VENTOLIN HFA) 108 (90 Base) MCG/ACT inhaler 2 puff (not administered)   amphetamine-dextroamphetamine (ADDERALL) per tablet 20 mg (20 mg Oral Given 10/10/18 0916)   fentaNYL (DURAGESIC) 25 mcg/hr 72 hr patch 1 patch (1 patch Transdermal Given 10/10/18 0916)   Lidocaine (LIDOCARE) 4 % Patch 1-2 patch (not administered)   LORazepam (ATIVAN) tablet 1 mg (not administered)   ondansetron (ZOFRAN-ODT) ODT tab 8 mg (not administered)   oxyCODONE (ROXICODONE) solution 10-15 mg (10 mg Oral Given 10/10/18 1500)   sucralfate (CARAFATE) suspension 1 g (not administered)   naloxone (NARCAN) injection 0.1-0.4 mg (not administered)   melatonin tablet 1 mg (not administered)   fentaNYL (DURAGESIC) patch REMOVAL (not administered)   fentaNYL (DURAGESIC) Patch in Place ( Transdermal Patch in Place 10/10/18 0926)   lidocaine patch REMOVAL (not administered)   lidocaine patch in PLACE ( Transdermal Not Given 10/10/18 1109)   lidocaine (viscous) (XYLOCAINE) 2 % 15 mL, alum & mag hydroxide-simethicone (MYLANTA ES/MAALOX  ES) 15 mL GI Cocktail (30 mLs Oral Given 10/10/18 0954)   vitamin D (ERGOCALCIFEROL) capsule 50,000 Units (not administered)   diphenhydrAMINE (BENADRYL) capsule 25 mg ( Oral See Alternative 10/10/18 1410)     Or   diphenhydrAMINE (BENADRYL) injection 25 mg (25 mg Intravenous Given 10/10/18 1410)   carvedilol (COREG) tablet 6.25 mg (not administered)   metroNIDAZOLE (FLAGYL) infusion 500 mg (not administered)   cefTRIAXone (ROCEPHIN) 2 g vial to attach to  ml bag for ADULTS or NS 50 ml bag for PEDS (not administered)   diphenhydrAMINE (BENADRYL) injection 25 mg (25 mg Intravenous Given 10/10/18 0055)   vancomycin (VANCOCIN) 2,000 mg in sodium chloride 0.9 % 500 mL intermittent infusion (0 mg Intravenous Stopped 10/10/18 0658)   oxyCODONE (ROXICODONE) solution 15 mg (15 mg Oral Given  10/10/18 0158)   ondansetron (ZOFRAN) injection 4 mg (4 mg Intravenous Given 10/10/18 0159)   diphenhydrAMINE (BENADRYL) injection 25 mg (25 mg Intravenous Given 10/10/18 0235)       Drips running?  Yes    Home pump  No    Current LDAs  PICC Double Lumen 06/06/18 Right Brachial vein medial (Active)   Number of days:126       CVC Single Lumen 08/02/18 Right (Active)   Number of days:69       CVC Single Lumen 09/18/18 Right (Active)   Number of days:22       Gastrostomy/Enterostomy Gastrostomy LUQ 1 18 fr (Active)   Number of days:2288       Wound 06/05/18 Left;Posterior;Mid;Inner;Lower Arm Ulceration Bruised w/ an unopened sore (Active)   Number of days:127       Incision/Surgical Site 02/20/15 Bilateral Neck (Active)   Number of days:1328       Incision/Surgical Site 02/15/17 Neck (Active)   Number of days:602       Incision/Surgical Site 12/19/17 Right Chest (Active)   Number of days:295       Labs results:   Labs Ordered and Resulted from Time of ED Arrival Up to the Time of Departure from the ED   CBC WITH PLATELETS DIFFERENTIAL - Abnormal; Notable for the following:        Result Value    RBC Count 3.87 (*)     Hemoglobin 12.0 (*)     Hematocrit 37.3 (*)     Platelet Count 103 (*)     All other components within normal limits   COMPREHENSIVE METABOLIC PANEL - Abnormal; Notable for the following:     Glucose 102 (*)     Calcium 8.1 (*)     Albumin 3.2 (*)     Protein Total 6.6 (*)     All other components within normal limits   ROUTINE UA WITH MICROSCOPIC REFLEX TO CULTURE - Abnormal; Notable for the following:     Blood Urine Small (*)     RBC Urine 16 (*)     All other components within normal limits   BLOOD CULTURE - Abnormal; Notable for the following:     Culture Micro   (*)     Value: Cultured on the 1st day of incubation:  Gram positive cocci in pairs and chains      All other components within normal limits   LACTIC ACID WHOLE BLOOD   IP ASSIGN PROVIDER TEAM TO TREATMENT TEAM   VITAL SIGNS   NO INDWELLING  "URINARY CATHETER (ABRUTO) PRESENT OR NEEDED   BLADDER SCAN   APPLY PNEUMATIC COMPRESSION DEVICE (PCD)   BLOOD CULTURE       Imaging Studies: No results found for this or any previous visit (from the past 24 hour(s)).    Recent vital signs:   /82  Pulse 66  Temp 99.4  F (37.4  C) (Oral)  Resp 17  Ht 1.816 m (5' 11.5\")  Wt 92.1 kg (203 lb 1.6 oz)  SpO2 97%  BMI 27.93 kg/m2    Cardiac Rhythm: Normal Sinus  Pt needs tele? No  Skin/wound Issues: None    Code Status: Full Code    Pain control: good    Nausea control: good    Abnormal labs/tests/findings requiring intervention: blood cultures preliminary results    Family present during ED course? Yes   Family Comments/Social Situation comments:      Tasks needing completion: None    Comments-When patient receives vancomycyin, he needs pretreatment with diphenhydramine, he gets red man syndrome. Pt is wearing fentanyl patch. Pt did not want to wear lidocaine patches, but will send with him. Eventually the PICC line will be pulled when an order is placed. On 10/11/2018 they will draw another set of blood cultures.  Jessica Perez  [phone] [facility] ED    "

## 2018-10-10 NOTE — ED NOTES
Talked to Dr. Kaplan from [facility] team regarding orders for pt. He said he was going but has not.

## 2018-10-10 NOTE — ED NOTES
[facility], [place]   ED Nurse to Floor Handoff     Parker Acevedo Sr is a 47 year old male who speaks English and lives with a spouse,  in a home  They arrived in the ED by car from home    ED Chief Complaint: Blood Infection    ED Dx;   Final diagnoses:   Bacteremia         Needed?: No    Allergies:   Allergies   Allergen Reactions     Bactrim [Sulfamethoxazole W/Trimethoprim] Rash     Penicillins Anaphylaxis     Doxycycline Rash     Vancomycin Rash     Rash after receiving vancomycin 3/28/16 (red man's?). Tolerated with slower infusion and diphenhydramine premed.   .  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)      Chronic abdominal pain      Complication of anesthesia      Difficulty swallowing      Gastric ulcer, unspecified as acute or chronic, without mention of hemorrhage, perforation, or obstruction      Gastro-oesophageal reflux disease      Generalized weakness 1/30/2018     Head injury      Hiatal hernia      Other bladder disorder      Other chronic pain      PONV (postoperative nausea and vomiting)      Severe malnutrition (H)     TPN     Short gut syndrome      Tobacco abuse       Baseline Mental status: WDL  Current Mental Status changes: at basesline. A/O x4    Infection present or suspected this encounter: cultures pending  Sepsis suspected: No  Isolation type: No active isolations     Activity level - Baseline/Home:  Independent  Activity Level - Current:   Independent    Bariatric equipment needed?: No    In the ED these meds were given:   Medications   diphenhydrAMINE (BENADRYL) injection 25 mg (25 mg Intravenous Given 10/10/18 0055)       Drips running?  No    Home pump  No    Current LDAs  PICC Double Lumen 06/06/18  "Right Brachial vein medial (Active)   Number of days:126       CVC Single Lumen 08/02/18 Right (Active)   Number of days:69       CVC Single Lumen 09/18/18 Right (Active)   Number of days:22       Gastrostomy/Enterostomy Gastrostomy LUQ 1 18 fr (Active)   Number of days:2288       Wound 06/05/18 Left;Posterior;Mid;Inner;Lower Arm Ulceration Bruised w/ an unopened sore (Active)   Number of days:127       Incision/Surgical Site 02/20/15 Bilateral Neck (Active)   Number of days:1328       Incision/Surgical Site 02/15/17 Neck (Active)   Number of days:602       Incision/Surgical Site 12/19/17 Right Chest (Active)   Number of days:295       Labs results:   Labs Ordered and Resulted from Time of ED Arrival Up to the Time of Departure from the ED   CBC WITH PLATELETS DIFFERENTIAL - Abnormal; Notable for the following:        Result Value    RBC Count 3.87 (*)     Hemoglobin 12.0 (*)     Hematocrit 37.3 (*)     Platelet Count 103 (*)     All other components within normal limits   COMPREHENSIVE METABOLIC PANEL   LACTIC ACID WHOLE BLOOD   ROUTINE UA WITH MICROSCOPIC REFLEX TO CULTURE   BLOOD CULTURE   BLOOD CULTURE       Imaging Studies: No results found for this or any previous visit (from the past 24 hour(s)).    Recent vital signs:   /64  Pulse 72  Temp 98.4  F (36.9  C) (Oral)  Resp 18  Ht 1.816 m (5' 11.5\")  Wt 92.1 kg (203 lb 1.6 oz)  SpO2 97%  BMI 27.93 kg/m2    Cardiac Rhythm: Other  Pt needs tele? No  Skin/wound Issues: diffused scratched marks    Code Status: Full Code    Pain control: fair    Nausea control: fair    Abnormal labs/tests/findings requiring intervention: None    Family present during ED course? Yes   Family Comments/Social Situation comments: wife at bedside    Tasks needing completion: None    Bre Child RN  ascom-- [phone] [place] [facility]  [phone] [facility] ED      "

## 2018-10-10 NOTE — PHARMACY-ADMISSION MEDICATION HISTORY
Admission medication history interview status for the 10/9/2018 admission is complete. See Epic admission navigator for allergy information, pharmacy, prior to admission medications and immunization status.     Medication history interview sources:  Patient, patient's wife, [facility], [facility], [facility]    Changes made to PTA medication list (reason)  Added: sodium chloride 0.9%, infuse 1504-3850 mL IV prn hydration (per [facility] and patient)  Deleted: none  Changed:   -carvedilol 12.6 mg by mouth twice daily --> 6.25 mg by mouth twice daily (per patient, [facility])  -TPN --> added formula and directions    Additional medication history information (including reliability of information, actions taken by pharmacist):  -Patient and his wife were good historians   -Contacted Ligia Lewis, TPN pharmacist at [facility], for most recent TPN formula. Updated in [facility] med list.  -Patient requesting increase in lorazepam dose from 1 mg daily prn to 2 mg daily prn  -Patient requesting IV Benadryl before vancomycin instead of oral, stating it helps him sleep more  -Patient has not taken sucralfate in several months due to not being able to afford medication. He stated he is supposed to take it four times daily.  -Takes vitamin D 50,000 units on Sundays  -Received B12 injection on 10/5/18  -Verified allergies and home pharmacy ([facility])      Prior to Admission medications    Medication Sig Last Dose Taking? Auth Provider   amphetamine-dextroamphetamine (ADDERALL) 20 MG per tablet Take 1 tablet (20 mg) by mouth daily 10/9/2018 at Unknown time Yes Esteban Daly MD   carvedilol (COREG) 6.25 MG tablet Take 6.25 mg by mouth 2 times daily (with meals) 10/9/2018 at AM Yes Unknown, Entered By History   cyanocobalamin (VITAMIN B12) 1000 MCG/ML injection Inject 1 mL (1,000 mcg) into the muscle every 30 days 10/5/2018 Yes Esteban Daly MD   fentaNYL (DURAGESIC) 25 mcg/hr 72 hr patch Place  1 patch onto the skin every 48 hours remove old patch.  Release on/after 10/6/18 to start on/after 10/8/18. 10/10/2018 at Unknown time Yes Patti Milligan MD   lidocaine (LIDODERM) 5 % Patch Place 1-2 patches onto the skin every 24 hours Wear for 12 hours, remove for 12 hours.  OK to cut to better fit to size. Past Month at Unknown time Yes Patti Milligan MD   lidocaine-prilocaine (EMLA) cream Apply topically as needed for moderate pain Past Month at Unknown time Yes Esteban Daly MD   LORazepam (ATIVAN) 1 MG tablet Take 1 tablet (1 mg) by mouth nightly as needed for anxiety or other (sleep) 10/9/2018 at Unknown time Yes Esteban Daly MD   ondansetron (ZOFRAN-ODT) 8 MG ODT tab Take 1 tablet (8 mg) by mouth every 8 hours as needed for nausea Past Week at Unknown time Yes Esteban Daly MD   oxyCODONE (ROXICODONE) 5 MG/5ML solution Take 10-15 mLs (10-15 mg) by mouth every 4 hours as needed for moderate to severe pain Max of 45 mg/day this month. Weaning dose as discussed. Fill on/after 10/6/18 not to start untill 10/8/18. Past Week at Unknown time Yes Patti Milligan MD   parenteral nutrition - PTA/DISCHARGE ORDER TPN+lipid Formula per [facility] 10/10/18: volume 1800 mL; AA 95g/d; Dex 235g/d; Intralipid 20% 350 mL; sodium 30 mEq/d; potassium 100 mEq/d; magnesium 16 mEq/d; phosphate 50 mMol/d; infuvite adult 10 mL/d; MTE-5 3mL/d; Cl:Ace 1:2; Infuse intravenously over 14 hours Monday-Friday.    Plain TPN Formula per [facility] 10/10/18: volume 2150 mL; all other ingredients identical to lipid formula. Infuse intravenously over 14 hours Saturday and Sunday.  Yes Unknown, Entered By History   sodium chloride 0.9% infusion Inject 1,000-2,000 mLs into the vein as needed   Yes Unknown, Entered By History   vitamin D (ERGOCALCIFEROL) 88084 UNIT capsule Take 1 capsule (50,000 Units) by mouth every 7 days  Patient taking differently: Take 50,000 Units by mouth every 7 days On sundays 10/7/2018  "Yes Esteban Daly MD   albuterol (PROAIR HFA/PROVENTIL HFA/VENTOLIN HFA) 108 (90 Base) MCG/ACT Inhaler Inhale 2 puffs into the lungs every 4 hours as needed for shortness of breath / dyspnea or wheezing More than a month at Unknown time  Esteban Daly MD   [facility] INFUSION MANAGED PATIENT Contact [facility] for patient specific medication information at [phone] on admission and discharge from the hospital.  Phones are answered 24 hours a day 7 days a week 365 days a year.    Providers - Choose \"CONTINUE HOME MED (no script)\" at discharge if patient treatment with home infusion will continue.   Ilsa Shine PA   Needle, Disp, (BD DISP NEEDLES) 27G X 1/2\" MISC 1 Device every 30 days Use for cyanocobalamin injection once q 30 days.   Esteban Daly MD   order for DME Equipment being ordered: Bilateral knee high chronic venous insufficiency stockings--  mild-moderate pressures.   Esteban Daly MD   order for DME Injection Supplies for Vitamin B12: 3cc syringes w/ 27 gauge needles, 1/2 inch length   Anita Méndez PA-C   sucralfate (CARAFATE) 1 GM/10ML suspension Take 10 mLs (1 g) by mouth 4 times daily  Patient taking differently: Take 1 g by mouth every 4 hours as needed (EPIGASTRIC PAIN, BILE BACKUP)  More than a month at Unknown time  Esteban Daly MD         Medication history completed by:   Margie Boone, Pharm.D.  PGY-1 Pharmacy Practice Resident      "

## 2018-10-11 LAB
ANION GAP SERPL CALCULATED.3IONS-SCNC: 8 MMOL/L (ref 3–14)
BUN SERPL-MCNC: 10 MG/DL (ref 7–30)
CALCIUM SERPL-MCNC: 8.5 MG/DL (ref 8.5–10.1)
CHLORIDE SERPL-SCNC: 107 MMOL/L (ref 94–109)
CO2 SERPL-SCNC: 28 MMOL/L (ref 20–32)
CREAT SERPL-MCNC: 0.69 MG/DL (ref 0.66–1.25)
ERYTHROCYTE [DISTWIDTH] IN BLOOD BY AUTOMATED COUNT: 13.6 % (ref 10–15)
GFR SERPL CREATININE-BSD FRML MDRD: >90 ML/MIN/1.7M2
GLUCOSE SERPL-MCNC: 95 MG/DL (ref 70–99)
HCT VFR BLD AUTO: 40.1 % (ref 40–53)
HGB BLD-MCNC: 12.8 G/DL (ref 13.3–17.7)
MAGNESIUM SERPL-MCNC: 2.2 MG/DL (ref 1.6–2.3)
MANGANESE BLD-MCNC: 11.2 UG/L (ref 4.2–16.5)
MCH RBC QN AUTO: 30.8 PG (ref 26.5–33)
MCHC RBC AUTO-ENTMCNC: 31.9 G/DL (ref 31.5–36.5)
MCV RBC AUTO: 96 FL (ref 78–100)
PHOSPHATE SERPL-MCNC: 3.2 MG/DL (ref 2.5–4.5)
PLATELET # BLD AUTO: 112 10E9/L (ref 150–450)
POTASSIUM SERPL-SCNC: 3.6 MMOL/L (ref 3.4–5.3)
RBC # BLD AUTO: 4.16 10E12/L (ref 4.4–5.9)
SODIUM SERPL-SCNC: 144 MMOL/L (ref 133–144)
WBC # BLD AUTO: 6.5 10E9/L (ref 4–11)

## 2018-10-11 PROCEDURE — 99233 SBSQ HOSP IP/OBS HIGH 50: CPT | Performed by: INTERNAL MEDICINE

## 2018-10-11 PROCEDURE — 80048 BASIC METABOLIC PNL TOTAL CA: CPT | Performed by: STUDENT IN AN ORGANIZED HEALTH CARE EDUCATION/TRAINING PROGRAM

## 2018-10-11 PROCEDURE — 25000125 ZZHC RX 250: Performed by: INTERNAL MEDICINE

## 2018-10-11 PROCEDURE — 40000556 ZZH STATISTIC PERIPHERAL IV START W US GUIDANCE

## 2018-10-11 PROCEDURE — 85027 COMPLETE CBC AUTOMATED: CPT | Performed by: STUDENT IN AN ORGANIZED HEALTH CARE EDUCATION/TRAINING PROGRAM

## 2018-10-11 PROCEDURE — 12000001 ZZH R&B MED SURG/OB UMMC

## 2018-10-11 PROCEDURE — 25000132 ZZH RX MED GY IP 250 OP 250 PS 637: Performed by: INTERNAL MEDICINE

## 2018-10-11 PROCEDURE — 25000132 ZZH RX MED GY IP 250 OP 250 PS 637: Performed by: STUDENT IN AN ORGANIZED HEALTH CARE EDUCATION/TRAINING PROGRAM

## 2018-10-11 PROCEDURE — 87077 CULTURE AEROBIC IDENTIFY: CPT | Performed by: INTERNAL MEDICINE

## 2018-10-11 PROCEDURE — 25000131 ZZH RX MED GY IP 250 OP 636 PS 637: Performed by: STUDENT IN AN ORGANIZED HEALTH CARE EDUCATION/TRAINING PROGRAM

## 2018-10-11 PROCEDURE — 84100 ASSAY OF PHOSPHORUS: CPT | Performed by: STUDENT IN AN ORGANIZED HEALTH CARE EDUCATION/TRAINING PROGRAM

## 2018-10-11 PROCEDURE — 3E0336Z INTRODUCTION OF NUTRITIONAL SUBSTANCE INTO PERIPHERAL VEIN, PERCUTANEOUS APPROACH: ICD-10-PCS | Performed by: INTERNAL MEDICINE

## 2018-10-11 PROCEDURE — 25000128 H RX IP 250 OP 636: Performed by: INTERNAL MEDICINE

## 2018-10-11 PROCEDURE — 36592 COLLECT BLOOD FROM PICC: CPT | Performed by: INTERNAL MEDICINE

## 2018-10-11 PROCEDURE — 83735 ASSAY OF MAGNESIUM: CPT | Performed by: STUDENT IN AN ORGANIZED HEALTH CARE EDUCATION/TRAINING PROGRAM

## 2018-10-11 PROCEDURE — 87040 BLOOD CULTURE FOR BACTERIA: CPT | Performed by: INTERNAL MEDICINE

## 2018-10-11 PROCEDURE — 25000128 H RX IP 250 OP 636: Performed by: PHYSICIAN ASSISTANT

## 2018-10-11 RX ORDER — KETOROLAC TROMETHAMINE 15 MG/ML
30 INJECTION, SOLUTION INTRAMUSCULAR; INTRAVENOUS ONCE
Status: DISCONTINUED | OUTPATIENT
Start: 2018-10-11 | End: 2018-10-11

## 2018-10-11 RX ORDER — KETOROLAC TROMETHAMINE 15 MG/ML
15 INJECTION, SOLUTION INTRAMUSCULAR; INTRAVENOUS ONCE
Status: COMPLETED | OUTPATIENT
Start: 2018-10-11 | End: 2018-10-11

## 2018-10-11 RX ADMIN — OXYCODONE HYDROCHLORIDE 10 MG: 5 SOLUTION ORAL at 07:47

## 2018-10-11 RX ADMIN — OXYCODONE HYDROCHLORIDE 15 MG: 5 SOLUTION ORAL at 22:08

## 2018-10-11 RX ADMIN — ONDANSETRON 8 MG: 4 TABLET, ORALLY DISINTEGRATING ORAL at 22:15

## 2018-10-11 RX ADMIN — ACETAMINOPHEN 1000 MG: 160 SUSPENSION ORAL at 18:05

## 2018-10-11 RX ADMIN — DEXTROAMPHETAMINE SACCHARATE, AMPHETAMINE ASPARTATE, DEXTROAMPHETAMINE SULFATE AND AMPHETAMINE SULFATE 20 MG: 2.5; 2.5; 2.5; 2.5 TABLET ORAL at 06:49

## 2018-10-11 RX ADMIN — CARVEDILOL 6.25 MG: 6.25 TABLET, FILM COATED ORAL at 06:49

## 2018-10-11 RX ADMIN — OXYCODONE HYDROCHLORIDE 10 MG: 5 SOLUTION ORAL at 03:08

## 2018-10-11 RX ADMIN — OMEPRAZOLE 40 MG: 20 CAPSULE, DELAYED RELEASE ORAL at 06:49

## 2018-10-11 RX ADMIN — METRONIDAZOLE 500 MG: 500 INJECTION, SOLUTION INTRAVENOUS at 13:03

## 2018-10-11 RX ADMIN — CARVEDILOL 6.25 MG: 6.25 TABLET, FILM COATED ORAL at 18:47

## 2018-10-11 RX ADMIN — SUCRALFATE 1 G: 1 SUSPENSION ORAL at 12:14

## 2018-10-11 RX ADMIN — SUCRALFATE 1 G: 1 SUSPENSION ORAL at 03:08

## 2018-10-11 RX ADMIN — KETOROLAC TROMETHAMINE 15 MG: 15 INJECTION, SOLUTION INTRAMUSCULAR; INTRAVENOUS at 18:47

## 2018-10-11 RX ADMIN — CEFTRIAXONE SODIUM 2 G: 2 INJECTION, POWDER, FOR SOLUTION INTRAMUSCULAR; INTRAVENOUS at 12:20

## 2018-10-11 RX ADMIN — OXYCODONE HYDROCHLORIDE 15 MG: 5 SOLUTION ORAL at 16:29

## 2018-10-11 RX ADMIN — METRONIDAZOLE 500 MG: 500 INJECTION, SOLUTION INTRAVENOUS at 01:42

## 2018-10-11 RX ADMIN — METRONIDAZOLE 500 MG: 500 INJECTION, SOLUTION INTRAVENOUS at 06:48

## 2018-10-11 RX ADMIN — ASCORBIC ACID, VITAMIN A PALMITATE, CHOLECALCIFEROL, THIAMINE HYDROCHLORIDE, RIBOFLAVIN-5 PHOSPHATE SODIUM, PYRIDOXINE HYDROCHLORIDE, NIACINAMIDE, DEXPANTHENOL, ALPHA-TOCOPHEROL ACETATE, VITAMIN K1, FOLIC ACID, BIOTIN, CYANOCOBALAMIN: 200; 3300; 200; 6; 3.6; 6; 40; 15; 10; 150; 600; 60; 5 INJECTION, SOLUTION INTRAVENOUS at 16:36

## 2018-10-11 RX ADMIN — ONDANSETRON 8 MG: 4 TABLET, ORALLY DISINTEGRATING ORAL at 12:14

## 2018-10-11 RX ADMIN — SUCRALFATE 1 G: 1 SUSPENSION ORAL at 07:47

## 2018-10-11 RX ADMIN — LORAZEPAM 1 MG: 0.5 TABLET ORAL at 21:15

## 2018-10-11 RX ADMIN — OXYCODONE HYDROCHLORIDE 10 MG: 5 SOLUTION ORAL at 12:14

## 2018-10-11 ASSESSMENT — ACTIVITIES OF DAILY LIVING (ADL)
ADLS_ACUITY_SCORE: 10
ADLS_ACUITY_SCORE: 12
ADLS_ACUITY_SCORE: 10
ADLS_ACUITY_SCORE: 12

## 2018-10-11 NOTE — PLAN OF CARE
Problem: Patient Care Overview  Goal: Plan of Care/Patient Progress Review  Outcome: No Change  VSS on RA. A&Ox4. Single lumen CVC SL. Pt complains of pain in lower back. Oxy given. Fentanyl patch on L Deltoid. Regular diet. TPN dependent - not ordered. Clamped G/J tube. Contact precautions for bacteremia. Independent. Goes outside to smoke occassionally. Pleasant. Pt verbalizes understanding of POC.

## 2018-10-11 NOTE — PLAN OF CARE
Problem: Patient Care Overview  Goal: Plan of Care/Patient Progress Review  Outcome: No Change  Status: On 5A w/bacteremia  VS: VSS on RA, afebrile  Neuros: A&Ox4  GI: Regular diet, but poor PO intake. TPN dependent at baseline. Ordered for today, not up from pharmacy yet. Will be running via PIV. BS+  : Voiding w/out difficulty  IV: PIV for TPN SL, R CVC SL between IV abx  Activity: Independent  Pain: Head and back pain managed fairly w/oxy and fentanyl patch. MD paged for IV pain meds per pt request  Labs/Tests: Per pt, possible PICC placement tmrw  Plan of care: Start TPN when up from pharmacy. Discharge plan unknown at this time. Will continue to monitor and follow plan of care.

## 2018-10-11 NOTE — PROGRESS NOTES
CLINICAL NUTRITION SERVICES - ASSESSMENT NOTE     Nutrition Prescription    RECOMMENDATIONS FOR MDs/PROVIDERS TO ORDER:  Alert RD/PharmD when able to use central line again for TPN    Malnutrition Status:    Patient does not meet two of the above criteria necessary for diagnosing malnutrition but is at risk for malnutrition    Recommendations already ordered by Registered Dietitian (RD):  (Sent change order request) PPN: peripheral Clinimix (D5/AA4.25) @ 85 mL/hr (2040 mL/day) + 250 mL 20% IV lipids daily which provides 1195 kcal (15 kcal/kg), 87 g protein (1.1 g/kg PRO), 102 g CHO (GIR 0.9 @ peak infusion), and 42% kcal from fat per dosing weight 80 kg.  This meets ~60% kcal needs and 100% protein needs.    Mg++ and Phos add-ons     REASON FOR ASSESSMENT  Parker Acevedo Sr is a/an 47 year old male assessed by the dietitian for Pharmacy/Nutrition to Start and Manage PN (Comments: Has azevedo but likely infected. Please run peripheral until plan for catheter determined.)  and Provider Order - TPN    NUTRITION HISTORY  Obtained information from patient and chart.  Pt says he last ran his TPN Sat/Sun and was running TPN before that without much issue.  Pt says he hasn't eaten much by mouth recently.    Home TPN (per Pharmacy med history note, pt's home TPN was obtained from [facility] Pharmacist yesterday)  Mon-Fri: 1800 mL/day x 14 hours with 235 g dextrose, 95 g AA, and 350 mL 20% IV lipids.  This provides 1879 kcal on these days.  Sat-Sun: 2150 mL/day x 14 hours with 235 g dextrose, 95 g AA, no lipids. This provides 1179 kcal on these days.    7-day avg TPN intake = 1679 kcal (21 kcal/kg), 1.2 g/kg PRO, GIR 3.5 @ peak infusion, and 30% kcal from fat per dosing weight 80 kg.    Per chart, PMH includes RNY gastric bypass (2002), esopagojejunostomy (c/b GJ ulcers and adhesions requiring revision surgeries and bowel resections).  Admit with positive blood cultures    CURRENT NUTRITION ORDERS  Diet:  "Regular  Intake/Tolerance: pt says he hasn't eaten much by mouth recently    LABS  Labs reviewed  - K+, Mg++, Phos WNL  - Alk Phos, ALT, AST, and Tbili WNL on 10/10  - TG WNL on 10/8    MEDICATIONS  Medications reviewed    ANTHROPOMETRICS  Height: 181.6 cm (5' 11.5\")  Most Recent Weight: 88.6 kg (195 lb 4.8 oz)    IBW: 79.5 kg  BMI: Overweight BMI 25-29.9  Weight History: Pt says recent UBW in the ~190 lbs.  However, per [facility] records pt reported weighing 171 lbs on 9/25/18 and weighed 171 lbs on 9/18/18 at [facility].  Given weight trends with current PN kcal provisions, suspect weight of ~170 lbs may be more accurate and current weights fluid up?  Wt Readings from Last 10 Encounters:   10/10/18 88.6 kg (195 lb 4.8 oz)   10/05/18 86.6 kg (191 lb)   10/03/18 79.8 kg (176 lb)   09/18/18 77.6 kg (171 lb)   09/11/18 77.6 kg (171 lb)   08/02/18 87.5 kg (193 lb)   07/30/18 87.5 kg (193 lb)   06/27/18 87.5 kg (193 lb)   06/21/18 88.6 kg (195 lb 6.4 oz)   06/06/18 89.5 kg (197 lb 6.4 oz)      Dosing Weight: 80 kg (actual)    ASSESSED NUTRITION NEEDS  Estimated Energy Needs: [id number] kcals/day (20 - 25 - 30 kcals/kg)  Justification: Modest energy with PN/maintenance based on home PN kcals and weight trends  Estimated Protein Needs: 80-96 grams protein/day (1 - 1.2 grams of pro/kg)  Justification: Maintenance  Estimated Fluid Needs: 3300-2268 mL/day (25 - 30 mL/kg)   Justification: Maintenance, or other per provider pending fluid status    PHYSICAL FINDINGS  See malnutrition section below.    MALNUTRITION  % Intake: Decreased intake does not meet criteria  % Weight Loss: None noted  Subcutaneous Fat Loss: None observed  Muscle Loss: None observed  Fluid Accumulation/Edema: None noted per chart review  Malnutrition Diagnosis: Patient does not meet two of the above criteria necessary for diagnosing malnutrition but is at risk for malnutrition    NUTRITION DIAGNOSIS  Inadequate protein-energy intake related to TPN interruptions " as evidenced by no parenteral nutrition x 3 days and minimal PO intake at baseline and reliance on TPN due to malabsorption      INTERVENTIONS  Implementation  Nutrition Education: Provided education on role of RD and nutrition POC   Parenteral Nutrition/IV Fluids - sent change order request (see above)    Goals  Resume central PN within 2-3 days     Monitoring/Evaluation  Progress toward goals will be monitored and evaluated per protocol.     Steph Tijerina RD, LD  Pager: [phone]

## 2018-10-11 NOTE — PROGRESS NOTES
"[facility], [place]    Internal Medicine Progress Note - Gold Service      Assessment & Plan   Parker Acevedo Sr is a 47 year old male admitted on 10/9/2018. He has a history of Celestino-en-Y gastric bypass, esophagojejunostromy, and chronic malnutrition on TPN with recurrent bacteriemia and is admitted for fevers and chills found to be bacteremic.    Main Plans for Today\"  - Continue vancomycin (holding for 24 hours), ceftriaxone, and metronidazole  - Repeat and follow cultures  - Start PPN  - Appreciate ID and GI involvement    # Polymicrobial CLABSI - Fevers at home but afebrile on presentation without leukocytosis, tachycardia, or tachypnea. Blood cultures growing Staph Hominis, Gram Positive Diptheroids, and Gram Negative Rods from both azevedo catheter and peripheral blood. Etiology unclear but likely from Azevedo. Has had multiple line infections in the past. GI consulted for possible GI source of bacteremia but per GI unlikely to be the cause and currently holding off on performing EGD/Colonoscopy as it would be difficult given patient's complicated anatomy.  - Continue vancomycin (after holding for penicillin sensitivity testing), ceftriaxone, and metronidazole  - ID Consult, appreciate recommendations  - Plan for Azevedo pending further culture results  - GI Consulted for possible GI source and signed off as GI source less likely  - Restart PPN, avoiding administration through azevedo until definitive plan in place  - Pencillin Allergy Testing    # Chronic Malnutrition on TPN  - Restart PPN  - Appreciate Nutrition input    # Anxiety and Attention Deficit Hyperactivity Disorder   - Continue PTA lorazepam  - Continue PTA amphetamine-dextroamphetamine (Adderall)    # Chronic Pain  - Continue PTA fentanyl patch, lidocaine cream, oxycodone    # History of Cardiomyopathy  - Continue PTA carvedilol    Diet: Regular Diet Adult  Fluids: PPN  Sanchez Catheter: not present    DVT " Prophylaxis: Low Risk/Ambulatory with no VTE prophylaxis indicated  Code Status: Full Code    Expected discharge: 4 - 7 days, recommended to prior living arrangement once antibiotic plan established.    The patient's care was discussed with the Bedside Nurse, Care Coordinator/ and Patient.    Álvaro Dickerson MD  Internal Medicine Staff Hospitalist Service  [facility]  Pager: [phone]  Please see sticky note for cross cover information    Interval History   No acute events overnight. Blood cultures drawn 10/10 returned positive. Reports a headache this morning but otherwise feels well. No fevers, chills, or rigors. No abdominal pain. Catheter without significant pain.      Data reviewed today: I reviewed all medications, new labs and imaging results over the last 24 hours. I personally reviewed no images or EKG's today.    Physical Exam   Vital Signs: Temp: 96.4  F (35.8  C) Temp src: Oral BP: 134/87 Pulse: 76   Resp: 16 SpO2: 99 % O2 Device: None (Room air)    Weight: 195 lbs 4.8 oz  General Appearance: Chronically ill appearing, no acute distress, pleasant  Respiratory: CTAB  Cardiovascular: RRR, catheter site without erythema  GI: soft, nontender  Skin: pallor          Data     Recent Labs  Lab 10/11/18  0816 10/10/18  0038 10/08/18  1500   WBC 6.5 5.5 6.0   HGB 12.8* 12.0* 12.3*   MCV 96 96 96   * 103* 121*    143 142   POTASSIUM 3.6 3.6 3.6   CHLORIDE 107 106 105   CO2 28 29 30   BUN 10 14 13   CR 0.69 0.76 0.82   ANIONGAP 8 9 7   SILAS 8.5 8.1* 8.0*   GLC 95 102* 79   ALBUMIN  --  3.2* 3.5   PROTTOTAL  --  6.6* 6.7*   BILITOTAL  --  0.3 0.3   ALKPHOS  --  79 90   ALT  --  21 26   AST  --  15 15

## 2018-10-11 NOTE — PROGRESS NOTES
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 Blood cultures are positive for GPC in clusters, pairs and chains.  Although patient reported chronic abdominal pain, constipation and scanty blood output via G-tube, he did not notice recent changes in his GI symptoms. Furthermore, clinical evaluation did not reveal concerning features for intra-abdominal infection. Likely source of his bacteremia per ID evaluation is central catheter.  Considering the complexity of his surgical GI anatomy, evaluation of his GI tract should base on solid clinical data. So far, and from the available surgical notes, it seems like the patient's gastric remnant is not in direct communication with the esophagojejunal limb and thus this area is not accessible via an EGD. Endoscopic evaluation through the G-tube could be considered with surgical collaboration only if clinical data are supportive of a gastric source of his bacteremia (very unlikely). Colonoscopy is also limited by patient's ability to have bowel preparation (consdering history of chronic PO intolerance and dumping symptoms in the past).   In regard to initiation of PO/G-tube FEEDING, first patient is not willing to consider because of poor tolerance and dumping symptoms. Furthermore, this has been evaluated previously by bariatric  surgery. XR small intestine from 1/12/18 showed no evidence for small bowel obstruction and oral contrast reached the colon after 9 hours. For all this, PO/tube feeding was not found to be appropriate/adequate by bariatric surgery team, and patient continued to be on TPN. Will defer further future evaluation in this regard to bariatric surgery team.   Recommendations  - Continue monitoring for fevers, follow with blood cultures.  - Continue antibiotics, appreciate ID recommendations. Noted plans for Cm removal pending final blood cultures.   - No endoscopic evaluation (EGD/Colonsocpy) is warranted at this time.   - Continue PPI 40 mg daily.   - Discussed with primary team. GI team will sign off. Please do not hesitate to contact the GI service with any questions or concerns.      Patient care plan discussed with Dr. Caballero, GI staff physician. Thank you for involving us in this patient's care.     Lurdes Cruz  GI Fellow  P: [phone]

## 2018-10-11 NOTE — PROGRESS NOTES
Care Coordinator Progress Note    Admission Date/Time:  10/9/2018  Attending MD:  Álvaro Dickerson, *    Data  Chart reviewed, discussed with interdisciplinary team.   Patient was admitted for:    Recurrent bacteremia  On total parenteral nutrition  Other chronic pain  Bacteremia.    Concerns with insurance coverage for discharge needs: None.  Current Living Situation: Patient lives with spouse.  Support System: Supportive and Involved  Services Involved: Home Care and Home Infusion  Transportation at Discharge: Family or friend will provide  Transportation to Medical Appointments:   - Name of caregiver: wife  Barriers to Discharge: pending medical work up and medical tx plan    Coordination of Care and Referrals: Provided patient/family with options for Home Care and Home Infusion.        Assessment  RNCC met with pt at bedside to discuss possible discharge needs.  Explained RNCC role to wife and pt.  Pt states he has a headache and opted for his wife to discuss with this RNCC.  Pt has [facility] at home for TPN, states that a md stated he would need to discharge with IV abx.  He and his wife have done abx at home before and state if not covered by insurance he will self pay.  Declines to go to out pt infusion and or a TCU.  RNCC sent an update to both [facility] and [facility] of this pts possible need.   RNCC will continue to follow and update orders once a medical plan is established.  Pt may have to have azevedo removed then discharge will be delayed until a new IV is placed that can be used at home.     _______________________  [facility]  Phone  [phone]  Fax  [phone]  ______________________     [facility]  Phone  [phone]  Fax  [phone]  [place]: [phone]  Intake: [phone]        Plan  Anticipated Discharge Date: pending medical work up  Anticipated Discharge Plan:  Pending      GERRI Contreras, BSN    [facility]    Medicine Group  [address] [place] [address]  [place] [address]    [email or web address]  [email or web address]    Office: [phone] Pager: [phone]  To contact weekend RNCLARITZA, dial [phone] and enter pager number [phone] at prompt. This pager can not be contacted by text page or outside line.

## 2018-10-11 NOTE — PLAN OF CARE
Problem: Patient Care Overview  Goal: Plan of Care/Patient Progress Review  Outcome: No Change  Pt A/Ox4, VSS and C/O abd and back pain. PRN oxy 10mg given x1, relief. On contact ISO for blood infection. Pt came to ED after clinic called and made him aware he had bacteremia. Pt on vanc (must be premedicated with IV benadryl, pt has hx of mariella syndrome) rocephin and flagyl. Pt has R chest port being used for IV bax. Possible discontinue of port per report. Skin CDI. Will continue to monitor pt and follow POC.

## 2018-10-12 LAB
ALBUMIN SERPL-MCNC: 3 G/DL (ref 3.4–5)
ALP SERPL-CCNC: 78 U/L (ref 40–150)
ALT SERPL W P-5'-P-CCNC: 17 U/L (ref 0–70)
ANION GAP SERPL CALCULATED.3IONS-SCNC: 9 MMOL/L (ref 3–14)
AST SERPL W P-5'-P-CCNC: 9 U/L (ref 0–45)
BASOPHILS # BLD AUTO: 0 10E9/L (ref 0–0.2)
BASOPHILS NFR BLD AUTO: 0.4 %
BILIRUB DIRECT SERPL-MCNC: <0.1 MG/DL (ref 0–0.2)
BILIRUB SERPL-MCNC: 0.5 MG/DL (ref 0.2–1.3)
BUN SERPL-MCNC: 17 MG/DL (ref 7–30)
CALCIUM SERPL-MCNC: 8.3 MG/DL (ref 8.5–10.1)
CHLORIDE SERPL-SCNC: 107 MMOL/L (ref 94–109)
CO2 SERPL-SCNC: 26 MMOL/L (ref 20–32)
CREAT SERPL-MCNC: 0.6 MG/DL (ref 0.66–1.25)
DIFFERENTIAL METHOD BLD: ABNORMAL
EOSINOPHIL # BLD AUTO: 0.3 10E9/L (ref 0–0.7)
EOSINOPHIL NFR BLD AUTO: 6.1 %
ERYTHROCYTE [DISTWIDTH] IN BLOOD BY AUTOMATED COUNT: 13.4 % (ref 10–15)
GFR SERPL CREATININE-BSD FRML MDRD: >90 ML/MIN/1.7M2
GLUCOSE BLDC GLUCOMTR-MCNC: 103 MG/DL (ref 70–99)
GLUCOSE BLDC GLUCOMTR-MCNC: 116 MG/DL (ref 70–99)
GLUCOSE BLDC GLUCOMTR-MCNC: 95 MG/DL (ref 70–99)
GLUCOSE SERPL-MCNC: 105 MG/DL (ref 70–99)
HCT VFR BLD AUTO: 37.8 % (ref 40–53)
HGB BLD-MCNC: 12 G/DL (ref 13.3–17.7)
IMM GRANULOCYTES # BLD: 0 10E9/L (ref 0–0.4)
IMM GRANULOCYTES NFR BLD: 0.2 %
INR PPP: 1.16 (ref 0.86–1.14)
LYMPHOCYTES # BLD AUTO: 2 10E9/L (ref 0.8–5.3)
LYMPHOCYTES NFR BLD AUTO: 38.5 %
MAGNESIUM SERPL-MCNC: 2.2 MG/DL (ref 1.6–2.3)
MCH RBC QN AUTO: 30.5 PG (ref 26.5–33)
MCHC RBC AUTO-ENTMCNC: 31.7 G/DL (ref 31.5–36.5)
MCV RBC AUTO: 96 FL (ref 78–100)
MONOCYTES # BLD AUTO: 0.5 10E9/L (ref 0–1.3)
MONOCYTES NFR BLD AUTO: 10.1 %
NEUTROPHILS # BLD AUTO: 2.4 10E9/L (ref 1.6–8.3)
NEUTROPHILS NFR BLD AUTO: 44.7 %
NRBC # BLD AUTO: 0 10*3/UL
NRBC BLD AUTO-RTO: 0 /100
PHOSPHATE SERPL-MCNC: 3.8 MG/DL (ref 2.5–4.5)
PLATELET # BLD AUTO: 130 10E9/L (ref 150–450)
POTASSIUM SERPL-SCNC: 3.6 MMOL/L (ref 3.4–5.3)
PREALB SERPL IA-MCNC: 17 MG/DL (ref 15–45)
PROT SERPL-MCNC: 6.4 G/DL (ref 6.8–8.8)
RBC # BLD AUTO: 3.94 10E12/L (ref 4.4–5.9)
SODIUM SERPL-SCNC: 142 MMOL/L (ref 133–144)
WBC # BLD AUTO: 5.3 10E9/L (ref 4–11)

## 2018-10-12 PROCEDURE — 82248 BILIRUBIN DIRECT: CPT | Performed by: INTERNAL MEDICINE

## 2018-10-12 PROCEDURE — 87040 BLOOD CULTURE FOR BACTERIA: CPT | Performed by: INTERNAL MEDICINE

## 2018-10-12 PROCEDURE — 25000132 ZZH RX MED GY IP 250 OP 250 PS 637: Performed by: HOSPITALIST

## 2018-10-12 PROCEDURE — 85025 COMPLETE CBC W/AUTO DIFF WBC: CPT | Performed by: INTERNAL MEDICINE

## 2018-10-12 PROCEDURE — 83735 ASSAY OF MAGNESIUM: CPT | Performed by: INTERNAL MEDICINE

## 2018-10-12 PROCEDURE — 85610 PROTHROMBIN TIME: CPT | Performed by: INTERNAL MEDICINE

## 2018-10-12 PROCEDURE — 36592 COLLECT BLOOD FROM PICC: CPT | Performed by: INTERNAL MEDICINE

## 2018-10-12 PROCEDURE — 25000132 ZZH RX MED GY IP 250 OP 250 PS 637: Performed by: INTERNAL MEDICINE

## 2018-10-12 PROCEDURE — 84100 ASSAY OF PHOSPHORUS: CPT | Performed by: INTERNAL MEDICINE

## 2018-10-12 PROCEDURE — 84134 ASSAY OF PREALBUMIN: CPT | Performed by: INTERNAL MEDICINE

## 2018-10-12 PROCEDURE — 12000001 ZZH R&B MED SURG/OB UMMC

## 2018-10-12 PROCEDURE — 87077 CULTURE AEROBIC IDENTIFY: CPT | Performed by: INTERNAL MEDICINE

## 2018-10-12 PROCEDURE — 25000128 H RX IP 250 OP 636: Performed by: INTERNAL MEDICINE

## 2018-10-12 PROCEDURE — 25000132 ZZH RX MED GY IP 250 OP 250 PS 637: Performed by: STUDENT IN AN ORGANIZED HEALTH CARE EDUCATION/TRAINING PROGRAM

## 2018-10-12 PROCEDURE — 25000125 ZZHC RX 250: Performed by: INTERNAL MEDICINE

## 2018-10-12 PROCEDURE — 80053 COMPREHEN METABOLIC PANEL: CPT | Performed by: INTERNAL MEDICINE

## 2018-10-12 PROCEDURE — 00000146 ZZHCL STATISTIC GLUCOSE BY METER IP

## 2018-10-12 PROCEDURE — 25000131 ZZH RX MED GY IP 250 OP 636 PS 637: Performed by: STUDENT IN AN ORGANIZED HEALTH CARE EDUCATION/TRAINING PROGRAM

## 2018-10-12 PROCEDURE — 99233 SBSQ HOSP IP/OBS HIGH 50: CPT | Performed by: INTERNAL MEDICINE

## 2018-10-12 PROCEDURE — 36415 COLL VENOUS BLD VENIPUNCTURE: CPT | Performed by: INTERNAL MEDICINE

## 2018-10-12 RX ORDER — POLYETHYLENE GLYCOL 3350 17 G
2 POWDER IN PACKET (EA) ORAL
Status: DISCONTINUED | OUTPATIENT
Start: 2018-10-12 | End: 2018-10-14 | Stop reason: HOSPADM

## 2018-10-12 RX ADMIN — CARVEDILOL 6.25 MG: 6.25 TABLET, FILM COATED ORAL at 09:02

## 2018-10-12 RX ADMIN — LORAZEPAM 1 MG: 0.5 TABLET ORAL at 23:04

## 2018-10-12 RX ADMIN — NICOTINE POLACRILEX 2 MG: 2 LOZENGE ORAL at 04:00

## 2018-10-12 RX ADMIN — OXYCODONE HYDROCHLORIDE 15 MG: 5 SOLUTION ORAL at 19:04

## 2018-10-12 RX ADMIN — DEXTROAMPHETAMINE SACCHARATE, AMPHETAMINE ASPARTATE, DEXTROAMPHETAMINE SULFATE AND AMPHETAMINE SULFATE 20 MG: 2.5; 2.5; 2.5; 2.5 TABLET ORAL at 09:02

## 2018-10-12 RX ADMIN — ACETAMINOPHEN 1000 MG: 160 SUSPENSION ORAL at 17:28

## 2018-10-12 RX ADMIN — OXYCODONE HYDROCHLORIDE 10 MG: 5 SOLUTION ORAL at 10:35

## 2018-10-12 RX ADMIN — CEFTRIAXONE SODIUM 2 G: 2 INJECTION, POWDER, FOR SOLUTION INTRAMUSCULAR; INTRAVENOUS at 13:00

## 2018-10-12 RX ADMIN — OXYCODONE HYDROCHLORIDE 15 MG: 5 SOLUTION ORAL at 02:08

## 2018-10-12 RX ADMIN — SUCRALFATE 1 G: 1 SUSPENSION ORAL at 10:35

## 2018-10-12 RX ADMIN — ONDANSETRON 8 MG: 4 TABLET, ORALLY DISINTEGRATING ORAL at 23:08

## 2018-10-12 RX ADMIN — OXYCODONE HYDROCHLORIDE 15 MG: 5 SOLUTION ORAL at 06:00

## 2018-10-12 RX ADMIN — CARVEDILOL 6.25 MG: 6.25 TABLET, FILM COATED ORAL at 17:15

## 2018-10-12 RX ADMIN — POTASSIUM CHLORIDE: 2 INJECTION, SOLUTION, CONCENTRATE INTRAVENOUS at 17:14

## 2018-10-12 RX ADMIN — SUCRALFATE 1 G: 1 SUSPENSION ORAL at 02:08

## 2018-10-12 RX ADMIN — VANCOMYCIN HYDROCHLORIDE: 10 INJECTION, POWDER, LYOPHILIZED, FOR SOLUTION INTRAVENOUS at 17:15

## 2018-10-12 RX ADMIN — OXYCODONE HYDROCHLORIDE 15 MG: 5 SOLUTION ORAL at 23:08

## 2018-10-12 RX ADMIN — OXYCODONE HYDROCHLORIDE 10 MG: 5 SOLUTION ORAL at 14:51

## 2018-10-12 RX ADMIN — FENTANYL 1 PATCH: 25 PATCH, EXTENDED RELEASE TRANSDERMAL at 09:12

## 2018-10-12 RX ADMIN — I.V. FAT EMULSION 250 ML: 20 EMULSION INTRAVENOUS at 21:11

## 2018-10-12 RX ADMIN — ONDANSETRON 8 MG: 4 TABLET, ORALLY DISINTEGRATING ORAL at 14:50

## 2018-10-12 ASSESSMENT — ACTIVITIES OF DAILY LIVING (ADL)
ADLS_ACUITY_SCORE: 10

## 2018-10-12 NOTE — PROGRESS NOTES
[facility], [place]    Internal Medicine Progress Note - Gold Service      Assessment & Plan   Parker Acevedo Sr is a 47 year old male admitted on 10/9/2018. He has a history of Celestino-en-Y gastric bypass, esophagojejunostromy, and chronic malnutrition on TPN with recurrent bacteriemia and is admitted for fevers and chills found to be bacteremic.    Main Plans for Today:  - Continue ceftriaxone and follow cultures and sensitivities  - DIscontinued vancomycin and metronidazole yesterday per ID recommendations  - Cultures 10/11 turned positive with GNRs    # Polymicrobial CLABSI - Fevers at home but afebrile on presentation without leukocytosis, tachycardia, or tachypnea. Blood cultures growing Staph Hominis, Strep salivarius, and Achromobacter. Staph hominis thought likely skin contaminant, however strep and achromobacter likely pathogenic. Has had multiple line infections in the past. GI initially consulted for possible GI source of bacteremia but per GI unlikely to be the cause.  - Discontinued vancomycin and metronidazole (10/10-10/11)  - Continue ceftriaxone 10/10 - present  - ID Consult, appreciate recommendations  - Planning to salvage Cm given numerous removals and replacements in past  - GI Consulted for possible GI source and signed off as GI source less likely    Positive Cultures:   10/8:   Staph Hominis, Gram Positive Diphtheroids from peripheral blood  Strep Salivarius, Achromobacter xylosoxidans from Cm  10/10:   Strep salivarius, Achrombacter xylosoxidans from Cm  10/11:  GNRs from Cm    # Chronic Malnutrition on TPN  - Restart TPN  - Appreciate Nutrition input    # Anxiety and Attention Deficit Hyperactivity Disorder   - Continue PTA lorazepam  - Continue PTA amphetamine-dextroamphetamine (Adderall)    # Chronic Pain  - Continue PTA fentanyl patch, lidocaine cream, oxycodone    # History of Cardiomyopathy  - Continue PTA carvedilol    Diet: Regular  "Diet Adult  parenteral nutrition - Clinimix E  Fluids: TPN  Sanchez Catheter: not present    DVT Prophylaxis: Low Risk/Ambulatory with no VTE prophylaxis indicated  Code Status: Full Code    Expected discharge: 2 - 3 days, recommended to prior living arrangement once antibiotic plan established.    The patient's care was discussed with the Bedside Nurse, Care Coordinator/ and Patient.    Álvaro Dickerson MD  Internal Medicine Staff Hospitalist Service  [facility]  Pager: [phone]  Please see sticky note for cross cover information    Interval History   No acute events overnight. Feels better than he has. Denies any fevers or chills. Wants to leave tonight at 5pm. Informed that cultures have not been negative and in fact yesterday's cultures are now positive. He stated we \"agree to disagree\".     4 Point Review of Systems otherwise negative.      Data reviewed today: I reviewed all medications, new labs and imaging results over the last 24 hours. I personally reviewed no images or EKG's today.    Physical Exam   Vital Signs: Temp: 97.6  F (36.4  C) Temp src: Oral BP: 110/71 Pulse: 65   Resp: 16 SpO2: 97 % O2 Device: None (Room air)    Weight: 195 lbs 4.8 oz  General Appearance: Chronically ill appearing, no acute distress, pleasant  Respiratory: CTAB  Cardiovascular: RRR, catheter site without erythema  GI: soft, nontender  Skin: pallor          Data     Recent Labs  Lab 10/12/18  0638 10/11/18  0816 10/10/18  0038   WBC 5.3 6.5 5.5   HGB 12.0* 12.8* 12.0*   MCV 96 96 96   * 112* 103*   INR 1.16*  --   --     144 143   POTASSIUM 3.6 3.6 3.6   CHLORIDE 107 107 106   CO2 26 28 29   BUN 17 10 14   CR 0.60* 0.69 0.76   ANIONGAP 9 8 9   SILAS 8.3* 8.5 8.1*   * 95 102*   ALBUMIN 3.0*  --  3.2*   PROTTOTAL 6.4*  --  6.6*   BILITOTAL 0.5  --  0.3   ALKPHOS 78  --  79   ALT 17  --  21   AST 9  --  15         "

## 2018-10-12 NOTE — PROGRESS NOTES
[facility]  Patient is currently open to home care services with [facility].  The patient is currently receiving RN services.  [facility]  and team have been notified of patient admission.  [facility] liaison will continue to follow patient during stay.  If appropriate provide orders to resume home care at time of discharge.    Thank you  Marge Akbar RN, BSN  [facility] Liaison  [facility]  [phone]

## 2018-10-12 NOTE — PROGRESS NOTES
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     Organisms noted from peripheral blood (diptheroids, Staph hominis) are likely contaminants however Strep salivarius and Achromobacter found in Cm likely true pathogens. As no sign of MRSA/MRSE can discontinue vancomycin, and no anaerobes isolated can discharge metronidazole. Ceftriaxone should cover both Strep salivarius and (likely) Achromobacter, however we will follow up full susceptibilities    As patient is not systemically ill and no signs of disseminated infection, and considering he has had multiple exchanges of his CVC in the past would favor attempting to keep line in place and treat with IV antibiotics. This is pending clearance of his blood cultures. In this situation we would recommend 2 weeks IV antibiotics (likely ceftriaxone).      Recommendations:  - agree with discontinuing IV vancomycin   - discontinue metronidazole  - cont ceftriaxone  - await susceptibilities of Achromobacter  - anticipate 2 week course of IV ceftriaxone (start date is date of first negative culture)  - repeat blood cultures tomorrow (one from Cm and one from periphery)   - unless bacteremia is persistent will plan to treat with IV antibiotics and keep CVC in  place      Patient seen and discussed with attending physician Dr. Vanessa Whitmore MD  PGY-5 Infectious Diseases Fellow  [facility] [phone]            Interval History:     Patient reports feeling mildly nauseous this morning however transient and is now feeling well. Denies fever/chills. Ambulating without issue. Denies rash, diarrhea.          Physical Exam:   Ranges for vital signs:  Temp:  [96.4  F (35.8  C)-98.6  F (37  C)] 97.2  F (36.2  C)  Pulse:  [67-77] 77  Resp:  [16] 16  BP: (112-134)/(49-87) 112/74  SpO2:  [96 %-100 %] 100 %    Intake/Output Summary (Last 24 hours) at 10/11/18 1922  Last data filed at 10/11/18 1400   Gross per 24 hour   Intake              300 ml   Output                0 ml   Net              300 ml     Exam:  GENERAL:  NAD.   EYES:  Eyes have anicteric sclerae  NECK:  Supple.   LUNGS:  Clear to auscultation bilateral. Respiratory effort is normal  CARDIOVASCULAR:  Regular rate and rhythm with no murmurs, gallops or rubs.  ABDOMEN:  Normal bowel sounds, soft, nontender. No appreciable hepatosplenomegaly. G tube with no surrounding erythema.  SKIN:  No acute rashes.  Cm is in place without any surrounding erythema or exudate.   NEUROLOGIC:  Grossly nonfocal. Active x4 extremities  PSYCH: Appropriate affect, alert and oriented to person, place and time  CURRENT LINES: Right tunneled internal jugular and G tube          Laboratory Data:   Microbiology:     Culture Micro   Date Value Ref Range Status   10/11/2018 No growth after 6 hours  Preliminary   10/11/2018 No growth after 6 hours  Preliminary   10/10/2018 No growth after 1 day  Preliminary   10/10/2018 (A)  Preliminary    Cultured on the 1st day of incubation:  Streptococcus salivarius group  Referred to research section for identification     10/10/2018   Preliminary    Critical Value/Significant Value, preliminary result only, called to and read back by  Tanya NEIL on 10.10.18 at 1028. RD.      10/10/2018 (A)   Preliminary    Cultured on the 1st day of incubation:  Achromobacter xylosoxidans/denitrificans  Speciation in progress     10/08/2018 (A)  Preliminary    Cultured on the 1st day of incubation:  Streptococcus salivarius group  Referred to research section for identification     10/08/2018   Preliminary    Critical Value/Significant Value, preliminary result only, called to and read back by  Dr. Daly on 10.9.18 at 0800. bw     10/08/2018 (A)  Preliminary    Cultured on the 1st day of incubation:  Achromobacter xylosoxidans/denitrificans  Speciation in progress     10/08/2018   Preliminary    Paged Dr. Esteban Daly x2 on 10/10/18 to notify of second organism.  No call back   10/08/2018   Preliminary    (Note)  NEGATIVE for the following: Staphylococcus spp., Staph aureus, Staph  epidermidis, Staph lugdunensis, Streptococcus spp., Strep pneumoniae,  Strep pyogenes, Strep agalactiae, Strep anginosus group, Enterococcus  faecalis, Enterococcus faecium, and Listeria spp. by [employer]  multiplex nucleic acid test. Final identification and antimicrobial  susceptibility testing will be verified by standard methods.    Critical Value/Significant Value called to and read back by Nafisa Escalante RN ([facility]) at 1025 on 10.9.18 RD.        10/08/2018 (A)  Preliminary    Cultured on the 1st day of incubation:  Staphylococcus hominis  This isolate DOES NOT demonstrate inducible clindamycin resistance in vitro. Clindamycin   is susceptible and could be used when indicated, however, erythromycin is resistant and   should not be used.     10/08/2018   Preliminary    Critical Value/Significant Value, preliminary result only, called to and read back by  Nafisa Escalante RN on 10.9.18 at 1407. bw     10/08/2018 (A)  Preliminary    Cultured on the 2nd day of incubation:  Gram positive bacilli resembling diphtheroids  No further identification     10/08/2018   Preliminary    Critical Value/Significant Value, preliminary result  only, called to and read back by  Jessica NEIL on 10.10.18 at 1347. RD.     10/08/2018   Preliminary    (Note)  POSITIVE for Staphylococci other than S.aureus, S.epidermidis and  S.lugdunensis, by [employer] multiplex nucleic acid test.  Coagulase-negative staphylococci are the most common venipuncture or  collection associated skin CONTAMINANTS grown in blood cultures.  Final identification and antimicrobial susceptibility testing will be  verified by standard methods.    Specimen tested with Verigene multiplex, gram-positive blood culture  nucleic acid test for the following targets: Staph aureus, Staph  epidermidis, Staph lugdunensis, other Staph species, Enterococcus  faecalis, Enterococcus faecium, Streptococcus species, S. agalactiae,  S. anginosus grp., S. pneumoniae, S. pyogenes, Listeria sp., mecA  (methicillin resistance) and Melvin/B (vancomycin resistance).    Critical Value/Significant Value called to and read back by Nafisa Aponte RN @ 9216 10/9/18 CS      NEGATIVE for the following: Staphylococcus spp., Staph aureus, Staph  epidermidis, Staph lugdunensis, Streptococcus spp., Strep pneumoniae,  Strep pyogenes, Strep agalactiae, Strep anginosus group, Enterococcus  faecalis, Enterococcus faecium, and Listeria spp. by Verigene  multiplex nucleic acid test. Final identification and antimicrobial  susceptibility testing will be verified by standard methods.    Critical Value/Significant Value called to and read back by Hany Tyler RN, @3148 10/10/18.DH.     06/28/2018 No growth  Final   06/28/2018 No growth  Final   06/21/2018 No growth  Final   06/21/2018 No growth  Final   06/04/2018 No growth  Final   06/04/2018 No growth  Final   06/04/2018 No growth  Final   06/02/2018 Canceled, Test credited  Final   06/02/2018 Canceled via EPIC interface  Final   06/02/2018 Canceled, Test credited  Final   06/02/2018 Canceled via EPIC interface  Final   06/01/2018 No growth  Final   06/01/2018 (A)  Final     Cultured on the 1st day of incubation:  Enterococcus faecalis     06/01/2018   Final    Critical Value/Significant Value, preliminary result only, called to and read back by  ROGER MARTIN RN U5A 0455 06.02.18 CF     06/01/2018 Susceptibility testing done on previous specimen  Final   05/31/2018 (A)  Final    Cultured on the 1st day of incubation:  Enterococcus faecalis  Susceptibility testing done on previous specimen     05/31/2018   Final    Critical Value/Significant Value, preliminary result only, called to and read back by  Savannah Solitario RN from [facility]. 6.1.18 at 0525. GR.     05/31/2018 (A)  Final    Cultured on the 1st day of incubation:  Staphylococcus epidermidis     05/31/2018   Final    Critical Value/Significant Value called to and read back by  Maday Tan RN 5A at 1215 on 6/4/18 by ALMA.     05/31/2018 (A)  Final    Cultured on the 1st day of incubation:  Enterococcus faecalis     05/31/2018   Final    Critical Value/Significant Value, preliminary result only, called to and read back by  Savannah Jarrett, on call RN for URI. 6.1.18 at 0237. GR.      05/31/2018 (A)  Final    Cultured on the 1st day of incubation:  Staphylococcus haemolyticus     05/31/2018   Final    Critical Value/Significant Value called to and read back by  Maday Tan RN at 1515 on 6/4/18 by ALMA.     05/31/2018   Final    (Note)  POSITIVE for ENTEROCOCCUS FAECALIS and NEGATIVE for Melvin/vanB genes  by [employer] multiplex nucleic acid test. Final identification and  antimicrobial susceptibility testing will be verified by standard  methods.    Specimen tested with Verigene multiplex, gram-positive blood culture  nucleic acid test for the following targets: Staph aureus, Staph  epidermidis, Staph lugdunensis, other Staph species, Enterococcus  faecalis, Enterococcus faecium, Streptococcus species, S. agalactiae,  S. anginosus grp., S. pneumoniae, S. pyogenes, Listeria sp., mecA  (methicillin resistance) and Melvin/B  (vancomycin resistance).    Critical Value/Significant Value called to and read back by Savannah Jarrett RN from [facility]. 6.1.18 at 0521. GR.     01/15/2018 <15 colonies   Escherichia coli   (A)  Final   01/14/2018 No growth  Final   01/14/2018 No growth  Final   01/13/2018 (A)  Final    Cultured on the 1st day of incubation:  Enterococcus faecalis  Susceptibility testing done on previous specimen     01/13/2018 (A)  Final    Upon further review of the original slide, no gram negative rods are seen  Previously reported as:  Cultured on the 1st day of incubation:  Gram negative rods  CORRECTED ON:  1.20.18 @1407 AV     01/13/2018   Final    Critical Value/Significant Value, preliminary result only, called to and read back by  Taylor Bhatt RN from U7B. 1.14.18 at 0111. GR.     01/13/2018   Final    Corrected report called to and read back by  DENNIS Dooley 1.20.18 @1410 AV     01/13/2018 (A)  Final    Cultured on the 1st day of incubation:  Enterococcus faecalis  Susceptibility testing done on previous specimen     01/13/2018   Final    Critical Value/Significant Value, preliminary result only, called to and read back by  Shorty Montgomery RN at 1953 on 01.13.18 by mp     01/13/2018 (A)  Final    Cultured on the 1st day of incubation:  Staphylococcus hominis     01/13/2018   Final    Critical Value/Significant Value called to and read back by  Kasia Jones RN on 1.16.2018 at 1114. KVO     01/12/2018 (A)  Final    Cultured on the 1st day of incubation:  Escherichia coli  Susceptibility testing done on previous specimen     01/12/2018 (A)  Final    Cultured on the 1st day of incubation:  Enterococcus faecalis     01/12/2018   Final    Critical Value/Significant Value, preliminary result only, called to and read back by  Shorty Gardner RN 7B @ 0836. 01/13/18 01/12/2018   Final    (Note)  POSITIVE for ENTEROCOCCUS FAECALIS and NEGATIVE for Melvin/vanB genes  by [employer] multiplex nucleic acid test. Final identification  and  antimicrobial susceptibility testing will be verified by standard  methods.    Specimen tested with Verigene multiplex, gram-positive blood culture  nucleic acid test for the following targets: Staph aureus, Staph  epidermidis, Staph lugdunensis, other Staph species, Enterococcus  faecalis, Enterococcus faecium, Streptococcus species, S. agalactiae,  S. anginosus grp., S. pneumoniae, S. pyogenes, Listeria sp., mecA  (methicillin resistance) and Melvin/B (vancomycin resistance).    Critical Value/Significant Value called to and read back by Shorty Gardner RN 7B @ 1129.cg 01/13/18 01/12/2018 No growth  Final   01/12/2018 No growth  Final   01/12/2018 (A)  Final    Cultured on the 1st day of incubation:  Escherichia coli     01/12/2018   Final    Critical Value/Significant Value, preliminary result only, called to and read back by  Dr Jeet Orozco  in the [facility] at 8:20am 1/12/2018 ([facility])     01/12/2018   Final    (Note)  POSITIVE for E.COLI by Verigene multiplex nucleic acid test. Final  identification and antimicrobial susceptibility testing will be  verified by standard methods. Verigene test will not distinguish  E.coli from Shigella species including S.dysenteriae, S.flexneri,  S.boydii, and S.sonnei. Specimens containing Shigella species or  E.coli will be reported as Positive for E.coli.    Specimen tested with Verigene multiplex, gram-negative blood culture  nucleic acid test for the following targets: Acinetobacter sp.,  Citrobacter sp., Enterobacter sp., Proteus sp., E. coli, K.  pneumoniae/oxytoca, P. aeruginosa, and the following resistance  markers: CTXM, KPC, NDM, VIM, IMP and OXA.    Critical Value/Significant Value called to and read back by Dr. Jeet Orozco at 1042 on 1.12.18.KD     09/24/2017 No growth  Final   09/24/2017 No growth  Final   09/23/2017 No growth  Final   09/23/2017 No growth  Final   09/22/2017 No growth  Final   09/22/2017 No growth  Final   09/22/2017 No growth  Final   09/21/2017  No growth  Final   09/21/2017 (A)  Final    Cultured on the 1st day of incubation:  Streptococcus mitis group  Identification obtained by MALDI-TOF mass spectrometry research use only database. Test   characteristics determined and verified by the [facility]   ([facility]) [place], [place].     09/21/2017   Final    Critical Value/Significant Value, preliminary result only, called to and read back by  Nathaly Yo RN on 9/22/2017 @2011, tk     09/21/2017 (A)  Final    Cultured on the 1st day of incubation:  Streptococcus salivarius  Susceptibility testing done on previous specimen     09/21/2017   Final    Critical Value/Significant Value, preliminary result only, called to and read back by  Karine Meyers RN U5B 0400 09.22.17 CF     09/21/2017 (A)  Final    Cultured on the 1st day of incubation:  Rothia mucilaginosa  Susceptibility testing done on previous specimen     09/21/2017 (A)  Final    Cultured on the 1st day of incubation:  Staphylococcus epidermidis     09/21/2017 No growth  Final   09/21/2017 No growth  Final   09/20/2017 No growth  Final   09/20/2017 No growth  Final   09/19/2017 No growth  Final   09/19/2017 (A)  Preliminary    Cultured on the 2nd day of incubation:  Streptococcus salivarius  Susceptibility testing done on previous specimen     09/19/2017   Preliminary    Critical Value/Significant Value, preliminary result only, called to and read back by  Annel Hirsch RN at 0814 on 9.20.17.KD      09/19/2017 (A)  Preliminary    Cultured on the 2nd day of incubation:  Gram positive cocci in clusters  Susceptibility testing done on previous specimen     09/19/2017 Referred to research section for identification  Preliminary   09/19/2017 (A)  Final    Cultured on the 1st day of incubation:  Streptococcus salivarius     09/19/2017   Final    Critical Value/Significant Value, preliminary result only, called to and read back by  Dr. Jimenez, from [facility]. 09.19.17 at 1357. GR.      09/19/2017 (A)  Final    Cultured on the 1st day of incubation:  Streptococcus salivarius  Susceptibility testing done on previous specimen     09/19/2017   Final    Critical Value/Significant Value, preliminary result only, called to and read back by  Dr. Jimenez from [facility]. 09.19.17 at 1041. GR.     09/19/2017 (A)  Final    Cultured on the 1st day of incubation:  Streptococcus mitis group     09/19/2017 (A)  Final    Cultured on the 1st day of incubation:  Rothia mucilaginosa     09/19/2017   Final    Critical Value/Significant Value called to and read back by  KACIE Rose RN on 9.23.17 at 1101. bw     09/19/2017   Final    (Note)  NEGATIVE for the following: Staphylococcus spp., Staph aureus, Staph  epidermidis, Staph lugdunensis, Streptococcus spp., Strep pneumoniae,  Strep pyogenes, Strep agalactiae, Strep anginosus group, Enterococcus  faecalis, Enterococcus faecium, and Listeria spp. by Verigene  multiplex nucleic acid test. Final identification and antimicrobial  susceptibility testing will be verified by standard methods.    Critical Value/Significant Value called to and read back by Dr. Jimenez from [facility]. 09.19.17 at 1357. GR.     08/01/2017 No growth  Final   06/28/2017 No growth  Final   06/28/2017 No growth  Final   06/26/2017 No growth  Final   06/26/2017 (A)  Final    Cultured on the 1st day of incubation: Streptococcus salivarius group  Cultured on the 1st day of incubation: Staphylococcus epidermidis  Critical Value/Significant Value, preliminary result only, called to and read   back by  Dr. Francis Vyas @165 6/27/17. ct  Cultured on the 1st day of incubation: Rothia mucilaginosa  (Note)  POSITIVE for STAPHYLOCOCCUS EPIDERMIDIS and POSITIVE for the mecA  gene (resistant to methicillin) by Verigene multiplex nucleic acid  test. Final identification and antimicrobial susceptibility testing  will be verified by standard methods.    Specimen tested with Verigene multiplex, gram-positive blood  culture  nucleic acid test for the following targets: Staph aureus, Staph  epidermidis, Staph lugdunensis, other Staph species, Enterococcus  faecalis, Enterococcus faecium, Streptococcus species, S. agalactiae,  S. anginosus grp., S. pneumoniae, S. pyogenes, Listeria sp., mecA  (methicillin resistance) and Melvin/B (vancomycin resistance).    Critical Value/Significant Value called to and read back by Dr. Vyas @ 194 6/27/17        04/11/2017 No growth  Final   04/09/2017 No growth  Final   04/09/2017 No growth  Final   03/12/2017 No growth  Final           Other Laboratory Data:    Urine Studies  Recent Labs   Lab Test  10/10/18   0100  09/11/18   2345  06/01/18   2021  01/12/18   0042  09/19/17   0242  08/01/17   1133   LEUKEST  Negative  Negative  Negative  Negative  Negative  Negative   WBCU  1   --   <1  <1  2  O - 2       Inflammatory Markers  Recent Labs   Lab Test  08/20/18   1442  06/28/18   1400  06/21/18   1148  06/02/18   0609  06/01/18   1807  02/16/18   1515  02/12/18   1400  01/23/18   0845  01/20/18   1030   09/24/17   1900   06/28/17   2222  03/12/17   0351   11/01/16   1530   04/20/16   1530  04/11/16   1842   SED   --    --    --    --    --    --    --   10  11   --   31*   --   26*  17*   --   27*   --   34*  11   CRP  <2.9  <2.9  <2.9  30.0*  26.0*  8.2*  <2.9  <2.9  5.4   < >  26.6*   < >  53.0*  3.4   < >  8.4*   < >  12.3*  80.0*    < > = values in this interval not displayed.       Hematology Studies  Recent Labs   Lab Test  10/11/18   0816  10/10/18   0038  10/08/18   1500  10/03/18   1941  09/24/18   1440  09/18/18   0848  09/11/18   2248  09/06/18   1336  08/20/18   1442  08/10/18   1350   WBC  6.5  5.5  6.0  9.0  4.6  7.2  8.1  10.2  5.6  8.3   ANEU   --   2.2  3.8  5.5  2.8   --   3.9  6.0  3.1  4.5   AEOS   --   0.3  0.2   --   0.1   --    --   0.2  0.2  0.3   HGB  12.8*  12.0*  12.3*  13.0*  12.9*  12.5*  13.2*  14.3  13.0*  12.8*   MCV  96  96  96  94  96  95  95  95  96  96    PLT  112*  103*  121*  168  160  132*  142*  172  156  165       Immune Globulin Studies  No lab results found.    Metabolic Studies   Recent Labs   Lab Test  10/11/18   0816  10/10/18   0038  10/08/18   1500  10/03/18   1941  09/24/18   1440   NA  144  143  142  145*  144   POTASSIUM  3.6  3.6  3.6  3.6  3.4   CHLORIDE  107  106  105  111*  108   CO2  28  29  30  26  27   BUN  10  14  13  7  8   CR  0.69  0.76  0.82  0.75  0.66   GFRESTIMATED  >90  >90  >90  >90  >90       Hepatic Studies  Recent Labs   Lab Test  10/10/18   0038  10/08/18   1500  09/24/18   1440  09/06/18   1336  08/20/18   1442  08/10/18   1350   BILITOTAL  0.3  0.3  0.6  0.4  0.5  0.7   ALKPHOS  79  90  85  98  78  87   ALBUMIN  3.2*  3.5  3.6  3.5  3.7  3.7   AST  15  15  13  13  10  11   ALT  21  26  22  30  19  25       Thyroid Studies  Recent Labs   Lab Test  11/03/14   1355  05/20/14   1252   TSH  0.96  1.39       Vancomycin Levels  Recent Labs   Lab Test  06/14/18   2100  06/11/18   1330  06/08/18   1420   VANCOMYCIN  20.4  18.2  12.9       Hepatitis B Testing Recent Labs   Lab Test  09/20/17   0549   HEPBANG  Nonreactive     Hepatitis C Testing   Hepatitis C Antibody   Date Value Ref Range Status   09/20/2017 Nonreactive NR^Nonreactive Final     Comment:     Assay performance characteristics have not been established for newborns,   infants, and children

## 2018-10-12 NOTE — PLAN OF CARE
Problem: Patient Care Overview  Goal: Plan of Care/Patient Progress Review  AOx4, VSS. Pt on IV rocephin given 1x this shift. Oxycodone given 2x this shift for back/abdomen pain (chronic). Zofran given x1.  PPN running at 85 ml/hr.  Per MD plan to start TPN tomorrow (10/13) via chest port.  Continue to monitor.

## 2018-10-12 NOTE — PLAN OF CARE
Problem: Patient Care Overview  Goal: Plan of Care/Patient Progress Review  Outcome: No Change  Pt AOX4, VSS on RA. Up independently. C/o generalized pains, prn oxy, tylenol provided. Also, has fentanyl patch on R arm. Receiving cycled TPN via central line. TPN started at 1700. Infusing 85mL/hr for the first hr, 165mL/hr for 12 hrs, then 85mL/hr for the last hr. On IV abx for bacteremia. G-tube in place, clamped. Also on a regular diet, but reports little to no PO intake (which is his baseline). No BM, voiding WDL. RPIV SL. Calls/makes needs known. Will continue to monitor and follow POC.

## 2018-10-12 NOTE — PROGRESS NOTES
Care Coordinator - Discharge Planning    Admission Date/Time:  10/9/2018  Attending MD:  Álvaro Dickerson, *     Data  Date of initial CC assessment:    Chart reviewed, discussed with interdisciplinary team.   Patient was admitted for:   1. Recurrent bacteremia    2. On total parenteral nutrition    3. Other chronic pain    4. Bacteremia         Assessment   Full assessment completed in previous note    Coordination of Care and Referrals: Provided patient/family with options for Home Infusion.  Patient will need to be discharged on IV ceftriaxone daily for two weeks. Notified by [facility] that patients insurance will now cover this at home. Referral placed. AVS updated. Patient has done IV abx in the past at home and familiar on how to do so. Plan is for patient to discharge home tomorrow after dose of Ceftriaxone. [facility] liaison updated.   TPN recipe will need to be faxed to home infusion on day of discharge.     Addendum 1321: Patient continues to have positive blood cultures. Vanco hep lock added for discharge. MD spoke with patient and plan is for discharge on Sunday with both IV ceftriaxone and vanco hep lock. Patient must get both prior to discharge home. [facility] liaison updated.      Plan  Anticipated Discharge Date:  tomorrow  Anticipated Discharge Plan:  Home with home care and home infusion        Carlita Posadas, RN

## 2018-10-12 NOTE — PLAN OF CARE
"Problem: Patient Care Overview  Goal: Plan of Care/Patient Progress Review  Outcome: No Change  /72 (BP Location: Left arm)  Pulse 71  Temp 98.9  F (37.2  C) (Oral)  Resp 16  Ht 1.816 m (5' 11.5\")  Wt 88.6 kg (195 lb 4.8 oz)  SpO2 98%  BMI 26.86 kg/m2  7510-7461:  A&Ox4. Afebrile. VSS on RA. Headache; intolerable pain and Ronnie Larios Provider notified (1221), tried Tylenol and one time dose of Toradol given with some partial relief. Continues to take oxycodone 15 mg PRN for chronic pain. Fentanyl patch in place; change in AM. Not using lidocaine patches. Regular diet; poor PO intake. Not drinking a lot of water only sips with meds. Zofran x1 for nausea and emesis prevention. Also Ativan x1 at bedtime PRN. TPN Clinimix E at 85 ml/hr via R PIV. PORT is saline locked; pt states that MDs wanted his PORT Vanco locked but there is no order present that this time. PORT dressing is due to be changed; will attempt to get to this before 2330. Pt ambulating independently in halls and outside with his wife. Voids spontaneously. Plan for repeat blood cultures in AM. Continue to monitor and follow POC.       "

## 2018-10-12 NOTE — PROGRESS NOTES
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      Organisms noted from peripheral blood (diptheroids, Staph hominis) are likely contaminants however Strep salivarius and Achromobacter found in blood from CVC likely true pathogens.  Strep salivarius and Achromobacter both susceptible to ceftriaxone.     We are attempting to treat with IV antibiotics without removal of the line, however unfortunately his 10/11 cultures have returned positive with GNR (likely the Achromobacter?). Despite his lack of systemic symptoms will will need follow up cultures to ensure we are able to adequately control this infection with IV antibiotics alone, or whether he will require line removal.      Recommendations:  - cont ceftriaxone, plan for 2 weeks of IV ceftriaxone  - await identification of GNR from catheter 10/11  - anticipate vancomycin \"line lock\" at time of discharge, during 2 weeks of IV antibiotic therapy   - repeat blood cultures tomorrow (one from Cm only should be sufficient)   - if blood cultures from port remain positive we may need to discuss removal of the CVC (trying to avoid)     Patient seen and discussed with attending physician Dr. Vanessa Whitmore MD  PGY-5 Infectious Diseases Fellow  pgr [phone]         "    Interval History:   Patient has no acute complaints. Was hoping to go home today.   Has chronic intermittent nausea at times. Denies fevers, chills, vomiting, SOB, rash, diarrhea.          Current Antimicrobials       Ceftriaxone (10/10-present)         Physical Exam:   Ranges for vital signs:  Temp:  [97.6  F (36.4  C)-98.9  F (37.2  C)] 98.8  F (37.1  C)  Pulse:  [65-71] 70  Resp:  [16] 16  BP: (110-137)/(47-75) 111/75  SpO2:  [97 %-98 %] 98 %    Intake/Output Summary (Last 24 hours) at 10/12/18 1553  Last data filed at 10/12/18 1230   Gross per 24 hour   Intake          1582.58 ml   Output                0 ml   Net          1582.58 ml     Exam:  GENERAL:  NAD.   EYES:  Eyes have anicteric sclerae  NECK:  Supple.   LUNGS:  Clear to auscultation bilateral. Respiratory effort is normal  SKIN:  No acute rashes.  Cm is in place without any surrounding erythema or exudate.   NEUROLOGIC: ambulating with ease  PSYCH: Appropriate affect, alert and oriented to person, place and time  CURRENT LINES: Right tunneled internal jugular and G tube          Laboratory Data:   Microbiology:         Culture Micro   Date Value Ref Range Status   10/12/2018 PENDING  Preliminary   10/12/2018 No growth after 6 hours  Preliminary   10/12/2018 No growth after 6 hours  Preliminary   10/11/2018 (A)  Preliminary    Cultured on the 1st day of incubation:  Gram negative rods     10/11/2018   Preliminary    Critical Value/Significant Value, preliminary result only, called to and read back by  Pinky Dobson RN UU5A on 10.12.18 at 1157 RD.      10/11/2018 No growth after 1 day  Preliminary   10/10/2018 No growth after 2 days  Preliminary   10/10/2018 (A)  Preliminary    Cultured on the 1st day of incubation:  Streptococcus salivarius     10/10/2018   Preliminary    Critical Value/Significant Value, preliminary result only, called to and read back by  Tanya De Leon RN UUER on 10.10.18 at 1028. RD.      10/10/2018 (A)  Preliminary     Cultured on the 1st day of incubation:  Achromobacter xylosoxidans/denitrificans  Speciation in progress     10/08/2018 (A)  Preliminary    Cultured on the 1st day of incubation:  Streptococcus salivarius     10/08/2018   Preliminary    Critical Value/Significant Value, preliminary result only, called to and read back by  Dr. Daly on 10.9.18 at 0800. bw     10/08/2018 (A)  Preliminary    Cultured on the 1st day of incubation:  Achromobacter xylosoxidans/denitrificans  Speciation in progress     10/08/2018   Preliminary    Paged Dr. Esteban Daly x2 on 10/10/18 to notify of second organism.  No call back   10/08/2018   Preliminary    Susceptibility testing requested by  Chantale Garza on Achromobacter for Ceftriaxone.10/12/18 at 1122.TV.     10/08/2018   Preliminary    (Note)  NEGATIVE for the following: Staphylococcus spp., Staph aureus, Staph  epidermidis, Staph lugdunensis, Streptococcus spp., Strep pneumoniae,  Strep pyogenes, Strep agalactiae, Strep anginosus group, Enterococcus  faecalis, Enterococcus faecium, and Listeria spp. by [employer]  multiplex nucleic acid test. Final identification and antimicrobial  susceptibility testing will be verified by standard methods.    Critical Value/Significant Value called to and read back by Nafisa Escalante RN ([facility]) at 1025 on 10.9.18 RD.        10/08/2018 (A)  Preliminary    Cultured on the 1st day of incubation:  Staphylococcus hominis  This isolate DOES NOT demonstrate inducible clindamycin resistance in vitro. Clindamycin   is susceptible and could be used when indicated, however, erythromycin is resistant and   should not be used.     10/08/2018   Preliminary    Critical Value/Significant Value, preliminary result only, called to and read back by  Nafisa Escalante RN on 10.9.18 at 1407. bw     10/08/2018 (A)  Preliminary    Cultured on the 2nd day of incubation:  Gram positive bacilli resembling diphtheroids  No further identification  Susceptibility  testing in progress     10/08/2018   Preliminary    Critical Value/Significant Value, preliminary result only, called to and read back by  Jessica NEIL on 10.10.18 at 1347. RD.     10/08/2018   Preliminary    (Note)  POSITIVE for Staphylococci other than S.aureus, S.epidermidis and  S.lugdunensis, by Verigene multiplex nucleic acid test.  Coagulase-negative staphylococci are the most common venipuncture or  collection associated skin CONTAMINANTS grown in blood cultures.  Final identification and antimicrobial susceptibility testing will be  verified by standard methods.    Specimen tested with Verigene multiplex, gram-positive blood culture  nucleic acid test for the following targets: Staph aureus, Staph  epidermidis, Staph lugdunensis, other Staph species, Enterococcus  faecalis, Enterococcus faecium, Streptococcus species, S. agalactiae,  S. anginosus grp., S. pneumoniae, S. pyogenes, Listeria sp., mecA  (methicillin resistance) and Melvin/B (vancomycin resistance).    Critical Value/Significant Value called to and read back by Nafisa Aponte RN @ 8160 10/9/18 CS      NEGATIVE for the following: Staphylococcus spp., Staph aureus, Staph  epidermidis, Staph lugdunensis, Streptococcus spp., Strep pneumoniae,  Strep pyogenes, Strep agalactiae, Strep anginosus group, Enterococcus  faecalis, Enterococcus faecium, and Listeria spp. by Verigene  multiplex nucleic acid test. Final identification and antimicrobial  susceptibility testing will be verified by standard methods.    Critical Value/Significant Value called to and read back by Hany Tyler RN, @4903 10/10/18.DH.     06/28/2018 No growth  Final   06/28/2018 No growth  Final   06/21/2018 No growth  Final   06/21/2018 No growth  Final   06/04/2018 No growth  Final   06/04/2018 No growth  Final   06/04/2018 No growth  Final   06/02/2018 Canceled, Test credited  Final   06/02/2018 Canceled via EPIC interface  Final   06/02/2018 Canceled, Test credited   Final   06/02/2018 Canceled via EPIC interface  Final   06/01/2018 No growth  Final   06/01/2018 (A)  Final    Cultured on the 1st day of incubation:  Enterococcus faecalis     06/01/2018   Final    Critical Value/Significant Value, preliminary result only, called to and read back by  ROGER MARTIN RN U5A 0455 06.02.18 CF     06/01/2018 Susceptibility testing done on previous specimen  Final   05/31/2018 (A)  Final    Cultured on the 1st day of incubation:  Enterococcus faecalis  Susceptibility testing done on previous specimen     05/31/2018   Final    Critical Value/Significant Value, preliminary result only, called to and read back by  Savannah Solitario RN from [facility]. 6.1.18 at 0525. GR.     05/31/2018 (A)  Final    Cultured on the 1st day of incubation:  Staphylococcus epidermidis     05/31/2018   Final    Critical Value/Significant Value called to and read back by  Maday Tan RN 5A at 1215 on 6/4/18 by ALMA.     05/31/2018 (A)  Final    Cultured on the 1st day of incubation:  Enterococcus faecalis     05/31/2018   Final    Critical Value/Significant Value, preliminary result only, called to and read back by  Savannah Jarrett, on call RN for [facility]. 6.1.18 at 0237. GR.      05/31/2018 (A)  Final    Cultured on the 1st day of incubation:  Staphylococcus haemolyticus     05/31/2018   Final    Critical Value/Significant Value called to and read back by  Maday Tan RN at 1515 on 6/4/18 by ALMA.     05/31/2018   Final    (Note)  POSITIVE for ENTEROCOCCUS FAECALIS and NEGATIVE for Melvin/vanB genes  by N[employer] multiplex nucleic acid test. Final identification and  antimicrobial susceptibility testing will be verified by standard  methods.    Specimen tested with Verigene multiplex, gram-positive blood culture  nucleic acid test for the following targets: Staph aureus, Staph  epidermidis, Staph lugdunensis, other Staph species, Enterococcus  faecalis, Enterococcus faecium, Streptococcus species, S.  agalactiae,  S. anginosus grp., S. pneumoniae, S. pyogenes, Listeria sp., mecA  (methicillin resistance) and Melvin/B (vancomycin resistance).    Critical Value/Significant Value called to and read back by Savannah Jarrett RN from [facility]. 6.1.18 at 0521. GR.     01/15/2018 <15 colonies   Escherichia coli   (A)  Final   01/14/2018 No growth  Final   01/14/2018 No growth  Final   01/13/2018 (A)  Final    Cultured on the 1st day of incubation:  Enterococcus faecalis  Susceptibility testing done on previous specimen     01/13/2018 (A)  Final    Upon further review of the original slide, no gram negative rods are seen  Previously reported as:  Cultured on the 1st day of incubation:  Gram negative rods  CORRECTED ON:  1.20.18 @1407 AV     01/13/2018   Final    Critical Value/Significant Value, preliminary result only, called to and read back by  Taylor Bhatt RN from [facility]. 1.14.18 at 0111. GR.     01/13/2018   Final    Corrected report called to and read back by  DENNIS Dooley 1.20.18 @1410 AV     01/13/2018 (A)  Final    Cultured on the 1st day of incubation:  Enterococcus faecalis  Susceptibility testing done on previous specimen     01/13/2018   Final    Critical Value/Significant Value, preliminary result only, called to and read back by  Shorty Montgomery RN at 1953 on 01.13.18 by mp     01/13/2018 (A)  Final    Cultured on the 1st day of incubation:  Staphylococcus hominis     01/13/2018   Final    Critical Value/Significant Value called to and read back by  Kasia Jones RN on 1.16.2018 at 1114. KVO     01/12/2018 (A)  Final    Cultured on the 1st day of incubation:  Escherichia coli  Susceptibility testing done on previous specimen     01/12/2018 (A)  Final    Cultured on the 1st day of incubation:  Enterococcus faecalis     01/12/2018   Final    Critical Value/Significant Value, preliminary result only, called to and read back by  Shorty Gardner RN 7B @ 0836.cg 01/13/18 01/12/2018   Final    (Note)  POSITIVE for  ENTEROCOCCUS FAECALIS and NEGATIVE for Melvin/vanB genes  by Verigene multiplex nucleic acid test. Final identification and  antimicrobial susceptibility testing will be verified by standard  methods.    Specimen tested with Verigene multiplex, gram-positive blood culture  nucleic acid test for the following targets: Staph aureus, Staph  epidermidis, Staph lugdunensis, other Staph species, Enterococcus  faecalis, Enterococcus faecium, Streptococcus species, S. agalactiae,  S. anginosus grp., S. pneumoniae, S. pyogenes, Listeria sp., mecA  (methicillin resistance) and Melvin/B (vancomycin resistance).    Critical Value/Significant Value called to and read back by Shorty Gardner RN 7B @ 1129.cg 01/13/18 01/12/2018 No growth  Final   01/12/2018 No growth  Final   01/12/2018 (A)  Final    Cultured on the 1st day of incubation:  Escherichia coli     01/12/2018   Final    Critical Value/Significant Value, preliminary result only, called to and read back by  Dr Jeet Orozco  in the [facility] at 8:20am 1/12/2018 ()     01/12/2018   Final    (Note)  POSITIVE for E.COLI by Verigene multiplex nucleic acid test. Final  identification and antimicrobial susceptibility testing will be  verified by standard methods. Verigene test will not distinguish  E.coli from Shigella species including S.dysenteriae, S.flexneri,  S.boydii, and S.sonnei. Specimens containing Shigella species or  E.coli will be reported as Positive for E.coli.    Specimen tested with Verigene multiplex, gram-negative blood culture  nucleic acid test for the following targets: Acinetobacter sp.,  Citrobacter sp., Enterobacter sp., Proteus sp., E. coli, K.  pneumoniae/oxytoca, P. aeruginosa, and the following resistance  markers: CTXM, KPC, NDM, VIM, IMP and OXA.    Critical Value/Significant Value called to and read back by Dr. Jeet Orozco at 1042 on 1.12.18.KD     09/24/2017 No growth  Final   09/24/2017 No growth  Final   09/23/2017 No growth  Final   09/23/2017 No  growth  Final   09/22/2017 No growth  Final   09/22/2017 No growth  Final   09/22/2017 No growth  Final   09/21/2017 No growth  Final   09/21/2017 (A)  Final    Cultured on the 1st day of incubation:  Streptococcus mitis group  Identification obtained by MALDI-TOF mass spectrometry research use only database. Test   characteristics determined and verified by the [facility]   ([facility]) [place], [place].     09/21/2017   Final    Critical Value/Significant Value, preliminary result only, called to and read back by  Nathaly Yo RN on 9/22/2017 @2011, tk     09/21/2017 (A)  Final    Cultured on the 1st day of incubation:  Streptococcus salivarius  Susceptibility testing done on previous specimen     09/21/2017   Final    Critical Value/Significant Value, preliminary result only, called to and read back by  Karine Meyers RN U5B 0400 09.22.17 CF     09/21/2017 (A)  Final    Cultured on the 1st day of incubation:  Rothia mucilaginosa  Susceptibility testing done on previous specimen     09/21/2017 (A)  Final    Cultured on the 1st day of incubation:  Staphylococcus epidermidis     09/21/2017 No growth  Final   09/21/2017 No growth  Final   09/20/2017 No growth  Final   09/20/2017 No growth  Final   09/19/2017 No growth  Final   09/19/2017 (A)  Preliminary    Cultured on the 2nd day of incubation:  Streptococcus salivarius  Susceptibility testing done on previous specimen     09/19/2017   Preliminary    Critical Value/Significant Value, preliminary result only, called to and read back by  Annel Hirsch RN at 0814 on 9.20.17.KD      09/19/2017 (A)  Preliminary    Cultured on the 2nd day of incubation:  Gram positive cocci in clusters  Susceptibility testing done on previous specimen     09/19/2017 Referred to research section for identification  Preliminary   09/19/2017 (A)  Final    Cultured on the 1st day of incubation:  Streptococcus salivarius     09/19/2017   Final    Critical Value/Significant  Value, preliminary result only, called to and read back by  Dr. Jimenez, from [facility]. 09.19.17 at 1357. GR.     09/19/2017 (A)  Final    Cultured on the 1st day of incubation:  Streptococcus salivarius  Susceptibility testing done on previous specimen     09/19/2017   Final    Critical Value/Significant Value, preliminary result only, called to and read back by  Dr. Jimenez from [facility]. 09.19.17 at 1041. GR.     09/19/2017 (A)  Final    Cultured on the 1st day of incubation:  Streptococcus mitis group     09/19/2017 (A)  Final    Cultured on the 1st day of incubation:  Rothia mucilaginosa     09/19/2017   Final    Critical Value/Significant Value called to and read back by  KACIE Rose RN on 9.23.17 at 1101. bw     09/19/2017   Final    (Note)  NEGATIVE for the following: Staphylococcus spp., Staph aureus, Staph  epidermidis, Staph lugdunensis, Streptococcus spp., Strep pneumoniae,  Strep pyogenes, Strep agalactiae, Strep anginosus group, Enterococcus  faecalis, Enterococcus faecium, and Listeria spp. by Verigene  multiplex nucleic acid test. Final identification and antimicrobial  susceptibility testing will be verified by standard methods.    Critical Value/Significant Value called to and read back by Dr. Jimenez from [facility]. 09.19.17 at 1357. GR.     08/01/2017 No growth  Final   06/28/2017 No growth  Final   06/28/2017 No growth  Final   06/26/2017 No growth  Final   06/26/2017 (A)  Final    Cultured on the 1st day of incubation: Streptococcus salivarius group  Cultured on the 1st day of incubation: Staphylococcus epidermidis  Critical Value/Significant Value, preliminary result only, called to and read   back by  Dr. Francis Vyas @1652 6/27/17. ct  Cultured on the 1st day of incubation: Rothia mucilaginosa  (Note)  POSITIVE for STAPHYLOCOCCUS EPIDERMIDIS and POSITIVE for the mecA  gene (resistant to methicillin) by Verigene multiplex nucleic acid  test. Final identification and antimicrobial susceptibility  testing  will be verified by standard methods.    Specimen tested with Verigene multiplex, gram-positive blood culture  nucleic acid test for the following targets: Staph aureus, Staph  epidermidis, Staph lugdunensis, other Staph species, Enterococcus  faecalis, Enterococcus faecium, Streptococcus species, S. agalactiae,  S. anginosus grp., S. pneumoniae, S. pyogenes, Listeria sp., mecA  (methicillin resistance) and Melvin/B (vancomycin resistance).    Critical Value/Significant Value called to and read back by Dr. Vyas @ 1942 6/27/17                Other Laboratory Data:    Hematology Studies  Recent Labs   Lab Test  10/12/18   0638  10/11/18   0816  10/10/18   0038  10/08/18   1500  10/03/18   1941 09/24/18   1440   09/11/18   2248  09/06/18   1336  08/20/18   1442   WBC  5.3  6.5  5.5  6.0  9.0  4.6   < >  8.1  10.2  5.6   ANEU  2.4   --   2.2  3.8  5.5  2.8   --   3.9  6.0  3.1   AEOS  0.3   --   0.3  0.2   --   0.1   --    --   0.2  0.2   HGB  12.0*  12.8*  12.0*  12.3*  13.0*  12.9*   < >  13.2*  14.3  13.0*   MCV  96  96  96  96  94  96   < >  95  95  96   PLT  130*  112*  103*  121*  168  160   < >  142*  172  156    < > = values in this interval not displayed.       Metabolic Studies   Recent Labs   Lab Test  10/12/18   0638  10/11/18   0816  10/10/18   0038  10/08/18   1500  10/03/18   1941   NA  142  144  143  142  145*   POTASSIUM  3.6  3.6  3.6  3.6  3.6   CHLORIDE  107  107  106  105  111*   CO2  26  28  29  30  26   BUN  17  10  14  13  7   CR  0.60*  0.69  0.76  0.82  0.75   GFRESTIMATED  >90  >90  >90  >90  >90       Hepatic Studies  Recent Labs   Lab Test  10/12/18   0638  10/10/18   0038  10/08/18   1500  09/24/18   1440  09/06/18   1336  08/20/18   1442   BILITOTAL  0.5  0.3  0.3  0.6  0.4  0.5   ALKPHOS  78  79  90  85  98  78   ALBUMIN  3.0*  3.2*  3.5  3.6  3.5  3.7   AST  9  15  15  13  13  10   ALT  17  21  26  22  30  19

## 2018-10-12 NOTE — PROGRESS NOTES
This is a recent snapshot of the patient's [facility] medical record.  For current drug dose and complete information and questions, call [phone] or In Basket pool, [facility] home infusion ([id number])  CSN Number:  [id number]

## 2018-10-12 NOTE — PLAN OF CARE
"Problem: Patient Care Overview  Goal: Plan of Care/Patient Progress Review  /71 (BP Location: Left arm)  Pulse 65  Temp 97.6  F (36.4  C) (Oral)  Resp 16  Ht 1.816 m (5' 11.5\")  Wt 88.6 kg (195 lb 4.8 oz)  SpO2 97%  BMI 26.86 kg/m2    Reason for admission: recurrent bacteremia and malnutrition.  Hx of gastric bypass, cardiomyopathy, and chronic pain.  Vitals: VSS  Activity: Up independently  Pain: Head pain which improved with oxycodone.  Fentanyl patch in place.  Neuro:  AO x 4  Cardiac: WDL  Respiratory: WDL  GI/:  TPN via R PIV.  Gave sucralfate for stomach pain.  Voiding spontaneously.  Diet:  Regular diet, minimal oral intake  Lines: R PIV, right chest port SL.  Skin/Wounds:  WDL  Plan: repeat blood cultures this AM. Chest dressing due to be changed today.      Continue to monitor and follow POC    Eduardo Lynch RN on 10/12/2018 at 6:20 AM        "

## 2018-10-12 NOTE — PROGRESS NOTES
CLINICAL NUTRITION SERVICES - BRIEF NOTE  *See RD note on 10/11 for nutrition assessment details  *Received consult: Pharmacy/Nutrition to start and manage TPN (Central Line in place)   Nutrition Prescription    Recommendations already ordered by Registered Dietitian (RD):  Resume CPN (modify lipids from home regimen to use 250 mL bags while inpatient):    2150 mL/day x 14 hours with goal 235 g dextrose, 95 g AA, and 250 mL 20% IV lipids daily which provides 1679 kcal (21 kcal/kg), 1.2 g/kg PRO, GIR 3.5 @ peak infusion, and 30% kcal from fat per dosing weight 80 kg     Pt last ran his home TPN on Saturday night into Sunday morning, and then PPN was started last night.   Now plan to resume full TPN via central line.    Labs: K+, Mg++, Phos WNL today and yesterday    INTERVENTIONS  Implementation  Parenteral Nutrition/IV Fluids - sent change order request  Collaboration with other providers: discussed plan to resume TPN through central line today with provider    Monitoring/Evaluation  Progress toward goals will be monitored and evaluated per protocol.     Steph Tijerina RD, LD  Pager: [phone]

## 2018-10-13 ENCOUNTER — APPOINTMENT (OUTPATIENT)
Dept: INTERVENTIONAL RADIOLOGY/VASCULAR | Facility: CLINIC | Age: 48
DRG: 315 | End: 2018-10-13
Attending: RADIOLOGY
Payer: COMMERCIAL

## 2018-10-13 LAB
ALBUMIN SERPL-MCNC: 3 G/DL (ref 3.4–5)
ALP SERPL-CCNC: 74 U/L (ref 40–150)
ALT SERPL W P-5'-P-CCNC: 19 U/L (ref 0–70)
ANION GAP SERPL CALCULATED.3IONS-SCNC: 7 MMOL/L (ref 3–14)
AST SERPL W P-5'-P-CCNC: 16 U/L (ref 0–45)
BILIRUB DIRECT SERPL-MCNC: <0.1 MG/DL (ref 0–0.2)
BILIRUB SERPL-MCNC: 0.2 MG/DL (ref 0.2–1.3)
BUN SERPL-MCNC: 18 MG/DL (ref 7–30)
CALCIUM SERPL-MCNC: 8.1 MG/DL (ref 8.5–10.1)
CHLORIDE SERPL-SCNC: 110 MMOL/L (ref 94–109)
CO2 SERPL-SCNC: 25 MMOL/L (ref 20–32)
CREAT SERPL-MCNC: 0.63 MG/DL (ref 0.66–1.25)
GFR SERPL CREATININE-BSD FRML MDRD: >90 ML/MIN/1.7M2
GLUCOSE BLDC GLUCOMTR-MCNC: 100 MG/DL (ref 70–99)
GLUCOSE BLDC GLUCOMTR-MCNC: 119 MG/DL (ref 70–99)
GLUCOSE BLDC GLUCOMTR-MCNC: 119 MG/DL (ref 70–99)
GLUCOSE BLDC GLUCOMTR-MCNC: 127 MG/DL (ref 70–99)
GLUCOSE SERPL-MCNC: 96 MG/DL (ref 70–99)
INR PPP: 1.08 (ref 0.86–1.14)
MAGNESIUM SERPL-MCNC: 2.3 MG/DL (ref 1.6–2.3)
PHOSPHATE SERPL-MCNC: 4.6 MG/DL (ref 2.5–4.5)
POTASSIUM SERPL-SCNC: 4.2 MMOL/L (ref 3.4–5.3)
PROT SERPL-MCNC: 6.6 G/DL (ref 6.8–8.8)
SODIUM SERPL-SCNC: 142 MMOL/L (ref 133–144)

## 2018-10-13 PROCEDURE — 87040 BLOOD CULTURE FOR BACTERIA: CPT | Performed by: INTERNAL MEDICINE

## 2018-10-13 PROCEDURE — 87800 DETECT AGNT MULT DNA DIREC: CPT | Performed by: PHYSICIAN ASSISTANT

## 2018-10-13 PROCEDURE — 85610 PROTHROMBIN TIME: CPT | Performed by: INTERNAL MEDICINE

## 2018-10-13 PROCEDURE — 99233 SBSQ HOSP IP/OBS HIGH 50: CPT | Performed by: INTERNAL MEDICINE

## 2018-10-13 PROCEDURE — 80053 COMPREHEN METABOLIC PANEL: CPT | Performed by: INTERNAL MEDICINE

## 2018-10-13 PROCEDURE — 0JPT3XZ REMOVAL OF TUNNELED VASCULAR ACCESS DEVICE FROM TRUNK SUBCUTANEOUS TISSUE AND FASCIA, PERCUTANEOUS APPROACH: ICD-10-PCS | Performed by: RADIOLOGY

## 2018-10-13 PROCEDURE — 25000131 ZZH RX MED GY IP 250 OP 636 PS 637: Performed by: STUDENT IN AN ORGANIZED HEALTH CARE EDUCATION/TRAINING PROGRAM

## 2018-10-13 PROCEDURE — 36589 REMOVAL TUNNELED CV CATH: CPT | Mod: RT

## 2018-10-13 PROCEDURE — 36415 COLL VENOUS BLD VENIPUNCTURE: CPT | Performed by: INTERNAL MEDICINE

## 2018-10-13 PROCEDURE — 05PY33Z REMOVAL OF INFUSION DEVICE FROM UPPER VEIN, PERCUTANEOUS APPROACH: ICD-10-PCS | Performed by: RADIOLOGY

## 2018-10-13 PROCEDURE — 87186 SC STD MICRODIL/AGAR DIL: CPT | Performed by: PHYSICIAN ASSISTANT

## 2018-10-13 PROCEDURE — 83735 ASSAY OF MAGNESIUM: CPT | Performed by: INTERNAL MEDICINE

## 2018-10-13 PROCEDURE — 25000132 ZZH RX MED GY IP 250 OP 250 PS 637: Performed by: INTERNAL MEDICINE

## 2018-10-13 PROCEDURE — 87077 CULTURE AEROBIC IDENTIFY: CPT | Performed by: PHYSICIAN ASSISTANT

## 2018-10-13 PROCEDURE — 80048 BASIC METABOLIC PNL TOTAL CA: CPT | Performed by: INTERNAL MEDICINE

## 2018-10-13 PROCEDURE — 36415 COLL VENOUS BLD VENIPUNCTURE: CPT | Performed by: PHYSICIAN ASSISTANT

## 2018-10-13 PROCEDURE — 82248 BILIRUBIN DIRECT: CPT | Performed by: INTERNAL MEDICINE

## 2018-10-13 PROCEDURE — 25000128 H RX IP 250 OP 636: Performed by: INTERNAL MEDICINE

## 2018-10-13 PROCEDURE — 12000001 ZZH R&B MED SURG/OB UMMC

## 2018-10-13 PROCEDURE — 25000132 ZZH RX MED GY IP 250 OP 250 PS 637: Performed by: STUDENT IN AN ORGANIZED HEALTH CARE EDUCATION/TRAINING PROGRAM

## 2018-10-13 PROCEDURE — 87040 BLOOD CULTURE FOR BACTERIA: CPT | Performed by: PHYSICIAN ASSISTANT

## 2018-10-13 PROCEDURE — 00000146 ZZHCL STATISTIC GLUCOSE BY METER IP

## 2018-10-13 PROCEDURE — 40000141 ZZH STATISTIC PERIPHERAL IV START W/O US GUIDANCE

## 2018-10-13 PROCEDURE — 84100 ASSAY OF PHOSPHORUS: CPT | Performed by: INTERNAL MEDICINE

## 2018-10-13 PROCEDURE — 84134 ASSAY OF PREALBUMIN: CPT | Performed by: INTERNAL MEDICINE

## 2018-10-13 PROCEDURE — 25000125 ZZHC RX 250

## 2018-10-13 PROCEDURE — 87070 CULTURE OTHR SPECIMN AEROBIC: CPT | Performed by: PHYSICIAN ASSISTANT

## 2018-10-13 RX ADMIN — ASCORBIC ACID, VITAMIN A PALMITATE, CHOLECALCIFEROL, THIAMINE HYDROCHLORIDE, RIBOFLAVIN-5 PHOSPHATE SODIUM, PYRIDOXINE HYDROCHLORIDE, NIACINAMIDE, DEXPANTHENOL, ALPHA-TOCOPHEROL ACETATE, VITAMIN K1, FOLIC ACID, BIOTIN, CYANOCOBALAMIN: 200; 3300; 200; 6; 3.6; 6; 40; 15; 10; 150; 600; 60; 5 INJECTION, SOLUTION INTRAVENOUS at 21:25

## 2018-10-13 RX ADMIN — OXYCODONE HYDROCHLORIDE 15 MG: 5 SOLUTION ORAL at 08:39

## 2018-10-13 RX ADMIN — SUCRALFATE 1 G: 1 SUSPENSION ORAL at 03:55

## 2018-10-13 RX ADMIN — CEFTRIAXONE SODIUM 2 G: 2 INJECTION, POWDER, FOR SOLUTION INTRAMUSCULAR; INTRAVENOUS at 12:47

## 2018-10-13 RX ADMIN — ONDANSETRON 8 MG: 4 TABLET, ORALLY DISINTEGRATING ORAL at 12:47

## 2018-10-13 RX ADMIN — OXYCODONE HYDROCHLORIDE 15 MG: 5 SOLUTION ORAL at 12:47

## 2018-10-13 RX ADMIN — OXYCODONE HYDROCHLORIDE 15 MG: 5 SOLUTION ORAL at 03:50

## 2018-10-13 RX ADMIN — OXYCODONE HYDROCHLORIDE 15 MG: 5 SOLUTION ORAL at 16:57

## 2018-10-13 RX ADMIN — ACETAMINOPHEN 1000 MG: 160 SUSPENSION ORAL at 13:52

## 2018-10-13 RX ADMIN — OXYCODONE HYDROCHLORIDE 15 MG: 5 SOLUTION ORAL at 22:17

## 2018-10-13 RX ADMIN — DEXTROAMPHETAMINE SACCHARATE, AMPHETAMINE ASPARTATE, DEXTROAMPHETAMINE SULFATE AND AMPHETAMINE SULFATE 20 MG: 2.5; 2.5; 2.5; 2.5 TABLET ORAL at 08:25

## 2018-10-13 RX ADMIN — ONDANSETRON 8 MG: 4 TABLET, ORALLY DISINTEGRATING ORAL at 22:19

## 2018-10-13 RX ADMIN — CARVEDILOL 6.25 MG: 6.25 TABLET, FILM COATED ORAL at 08:27

## 2018-10-13 RX ADMIN — OMEPRAZOLE 40 MG: 20 CAPSULE, DELAYED RELEASE ORAL at 08:29

## 2018-10-13 RX ADMIN — CARVEDILOL 6.25 MG: 6.25 TABLET, FILM COATED ORAL at 17:00

## 2018-10-13 RX ADMIN — LORAZEPAM 1 MG: 0.5 TABLET ORAL at 22:16

## 2018-10-13 RX ADMIN — SUCRALFATE 1 G: 1 SUSPENSION ORAL at 17:02

## 2018-10-13 ASSESSMENT — ACTIVITIES OF DAILY LIVING (ADL)
ADLS_ACUITY_SCORE: 10

## 2018-10-13 NOTE — PROGRESS NOTES
"CLINICAL NUTRITION SERVICES - BRIEF NOTE     Nutrition Prescription    Recommendations already ordered by Registered Dietitian (RD):  ClinimixE @ 90 ml/hr (2160 ml daily) with 250 ml of 20% IV lipids daily providing 1234 kcals (15 kcal/kg/day), 92 g PRO (1.2 g/kg/day), 108 g Dex, and 41% of kcals from fat daily.   *This meets 77% of est energy needs and 100% of est protein needs.     Future/Additional Recommendations:  1. Monitor ability to transition off of PPN to CPN as appropriate.    *Refer to RD note on 10/11 for full nutrition assessment.      Provider Order: Pharm/Nutrition to start & manage TPN: Standard Peripheral Catheter -REQUIRES peripheral formula; \"< 100 cm of small bowel without a colon; Cm to be removed today\"    INTERVENTIONS  Implementation  Parenteral Nutrition/IV Fluids - Initiate (see above)     Monitoring/Evaluation  Will continue to monitor and evaluate per protocol.    Estelle Limon RD, LD   Weekend pager [phone]    "

## 2018-10-13 NOTE — PLAN OF CARE
Problem: Patient Care Overview  Goal: Plan of Care/Patient Progress Review  Outcome: Improving  4068-3583:    Bradycardia (56); patient asymptomatic. On RA. Alert, oriented x4. Per patient request, PRN ativan given at HS. Up independently in room; patient leaving unit to smoke. Regular diet. Intermittent nausea, no emesis; PRN Zofran 8 mg given x1. Blood sugar 100 and 119. TPN cycled with lipids infusing at 20.8 mL/hr to right CVC. Right PIV saline locked. Voiding wnl's. No stools reported; per patient, it's normal for him to not have a BM for weeks at a time. Complaints of abdominal and back pain; PRN oxycodone 15 mg given x2. Fentanyl patch remains in place on right arm. Anticipate discharge home with [facility] (today vs. Tomorrow).

## 2018-10-13 NOTE — CONSULTS
Physical Therapy Lymphedema Initial Evaluation  [facility]     Patient Name: Lina Valente  : 1947  MRN: [id number]  Today's Date: 2018      Visit Date: 2018    Visit Dx:    ICD-10-CM ICD-9-CM   1. Hereditary lymphedema Q82.0 757.0   2. Lymphedema of both lower extremities I89.0 457.1       Patient Active Problem List   Diagnosis   • Morbid obesity   • Acquired hypothyroidism   • Essential hypertension   • NAGY (dyspnea on exertion)   • Edema of both legs   • PAT (obstructive sleep apnea)        Past Medical History:   Diagnosis Date   • Anemia    • Edema of both legs    • Hypertension    • Shortness of breath    • Sleep apnea         Past Surgical History:   Procedure Laterality Date   • TUBAL ABDOMINAL LIGATION         Visit Dx:    ICD-10-CM ICD-9-CM   1. Hereditary lymphedema Q82.0 757.0   2. Lymphedema of both lower extremities I89.0 457.1           18 1400   Subjective Pain   Able to rate subjective pain? yes   Pre-Treatment Pain Level 0   Post-Treatment Pain Level 0   Lymphedema Assessment   Lymphedema Classification primary;stage 3 (Lymphostatic Elephantiasis);RLE:;LLE:   Infections or Cellulitis? no   Lymphedema Edema Assessment   Ptting Edema Category By grade out of 3   Pitting Edema + 1/3 (1 of 3)   Skin Changes/Observations   Skin Observations Comment creases with hypertrophy of skin at ankles and base of toes   Lymphedema Measurements   Measurement Type(s) Circumferential   Circumferential Areas Lower extremities   LLE Circumferential (cm)   Measurement Location 1 base of toes   Left 1 22 cm   Measurement Location 2 +12   Left 2 31.1 cm   Measurement Location 3 +10   Left 3 36.1 cm   Measurement Location 4 +10   Left 4 43.6 cm   Measurement Location 5 +10   Left 5 46 cm   Measurement Location 6 +10   Left 6 55.6 cm   RLE Circumferential (cm)   Measurement Location 1 base of toes   Right 1 22.3 cm   Measurement Location 2 +12   Right 2 29.7 cm   Measurement Location 3 +10    INTERVENTIONAL RADIOLOGY CONSULT NOTE    Reason for referral:   Removal of tunneled Cm catheter    History:   47-year-old male status post gastric bypass surgery, and has a Cm catheter in place for TPN and fluids for malnutrition and dehydration respectively, is now admitted with recurrent bacteremia.  Cm catheter removal requested.  Cm catheter was placed on 9/18/2018.    Labs:  Platelet count 130 10/12/2018, INR 1.08 10/13/2018    Imaging:   Fluoroscopy 9/18/2018 shows right internal jugular single-lumen Cm catheter in place.    Assessment/Plan:   Patient with bacteremia and tunneled Cm catheter, interventional radiology will plan on removing the catheter this weekend.  Referring team is aware.       Right 3 33.1 cm   Measurement Location 4 +10   Right 4 43.1 cm   Measurement Location 5 +10   Right 5 44.5 cm   Measurement Location 6 +10   Right 6 55 cm           03/19/18 1400   PT Short Term Goals   STG 1 Patient and family will have a basic understanding of the condition of lymphedema and the care required for it.    STG 1 Progress New   STG 2 Patient will be independent with basic HEP for lymphatic drainage for BLEs.   STG 2 Progress New   STG 3 Patient and family will be independent with self massage at home for BLE lymphedema.   STG 3 Progress New   Long Term Goals   LTG 1 Patient and family will be independent with use of appropriate compression garments for BLE.   LTG 1 Progress New   LTG 2 Patient will receive a home pump and be able to use this daily.   LTG 2 Progress New   LTG 3 If patient needs advanced pump, this will be addressed after basic pump failure.    LTG 3 Progress New   LTG 4 Patient will understand importance of following low fat, low sugar diet.   LTG 4 Progress New   Time Calculation   PT Goal Re-Cert Due Date 04/18/18 03/19/18 1400   PT Assessment   Functional Limitations Performance in self-care ADL;Decreased safety during functional activities   Impairments Edema;Impaired lymphatic circulation;Integumentary integrity;Joint mobility   Assessment Comments Mrs. Valente has BLE lymphedema which puts her at a higher risk of infection and skin changes and also impairs ankle and knee mobility. She has been wearing compression knee highs which her daughter reports have already decreased the size of her feet and legs. This is a positive sign that she would benefit from a true treatment phase to address the lymphedema. We will use MLD and compression to reduce the legs and get her to a level where she can maintain the condition at home. She would be a good candidate for a home lymphedema pump so steps will be taken to begin that process as well. Her daughter is  involved in her care and is currently putting the stockings on every morning for Mrs. Valente. We discussed the importance of skin care and adding exercise to her treatment as well. Thank you so much for the referral. I believe she will have a very good response to CDT.   Please refer to paper survey for additional self-reported information Yes   Rehab Potential Excellent   Patient/caregiver participated in establishment of treatment plan and goals Yes   Patient would benefit from skilled therapy intervention Yes   PT Plan   PT Frequency 2x/week   Predicted Duration of Therapy Intervention (OT Eval) 4-8 weeks   PT Plan Comments PT to treat 2X/wk X 4-8 weeks to address BLE lymphedema.                                              Time Calculation:             PT G-Codes  Functional Limitation: Other PT primary  Other PT Primary Current Status (): At least 40 percent but less than 60 percent impaired, limited or restricted  Other PT Primary Goal Status (): At least 20 percent but less than 40 percent impaired, limited or restricted         Mahsa Dubois, PT DPT [facility]  3/19/2018

## 2018-10-13 NOTE — PROGRESS NOTES
"Pt AOX4, VSS on RA. Up independently. C/o abd/epigastric pains, prn oxy Q4 hrs and tylenol for pain mgmt. Blood cultures remain positive for CVC, plan to pull per IR (possibly later this evening) and then get PICC line placed. IR says that if they cannot fit pt into schedule tonight, than he will get CVC removed in the morning. Pt very determined to discharge tmrw. New LPIV placed. Will begin PN and lipids this evening at 2000 BGs 96, 127. Little PO intake. Voiding WDL, no BM. Per pt his last BM was \"about a week and a half ago\" but this is pt's apparent baseline. Wife at bedside, supportive. Frequently ambulating around/off unit. Calls/makes needs known. Will continue to monitor and follow POC.   "

## 2018-10-13 NOTE — PLAN OF CARE
Problem: Patient Care Overview  Goal: Plan of Care/Patient Progress Review  Outcome: Improving  Time: 0571-9213     Reason for admission: Bacteriemia   Vitals: VSS ex bradycardia  Activity: Independent   Pain: Generalized pain   Neuro: A&Ox4  Cardiac: WNL   Respiratory: on RA  GI/: Voids w/o difficulty. No BM today.   Diet: NPO  Lines: R Cm, G tube in place, clamped.  Labs: Blood sugar 96 and 127.      New changes this shift: NPO. Pt feeling feverish, administered 1000 mg Tylenol. C/O generalized pain, oxy 15mg given x2. R PIV infiltrated, removed. Pt leaves unit to smoke. Wife is in the room.      Plan:  Removal of R Cm today. Possible discharge home 10/14 with  home care.   Continue to monitor and follow POC.

## 2018-10-13 NOTE — PROGRESS NOTES
Patient not seen physically today as he was out of the room.     Cultures (10/8, 10/10, 10/11, 10/12) continue to return positive for GNR. It seems that we will not be able to clear this infection with the line in place and we would recommend removal of the CVC.     Continue to recommend 2 weeks total of IV ceftriaxone.     Considering peripheral cultures have remained negative, okay to place PICC after CVC is removed (don't need to wait until blood cultures from CVC catheter cultures are negative for 48-72 hrs as often is recommended)    Patient can follow up with either Dr. Thompson (old ID provider, however has moved to [facility]), or Dr. Mendez after discharge.      Shaylee Whitmore MD  PGY-5 Infectous Diseases  pgr [phone]

## 2018-10-13 NOTE — PROGRESS NOTES
[facility], [place]    Internal Medicine Progress Note - Gold Service      Assessment & Plan   Parker Acevedo Sr is a 47 year old male admitted on 10/9/2018. He has a history of Celestino-en-Y gastric bypass, esophagojejunostromy, and chronic malnutrition on TPN with recurrent bacteriemia and is admitted for fevers and chills found to be bacteremic.    Main Plans for Today:  - Continue ceftriaxone and follow cultures and sensitivities  - Repeat culture through Cm drawn 10/12 turned positive  - IR consult for port removal, appreciate assistance  - If port able to be removed today, will obtain blood cultures and place PICC tomorrow prior to discharge    # Polymicrobial CLABSI - Fevers at home but afebrile on presentation without leukocytosis, tachycardia, or tachypnea. Blood cultures growing Staph Hominis, Strep salivarius, and Achromobacter. Staph hominis thought likely skin contaminant, however strep and achromobacter likely pathogenic. Has had multiple line infections in the past. GI initially consulted for possible GI source of bacteremia but per GI unlikely to be the cause.  - Discontinued vancomycin and metronidazole (10/10-10/11)  - Continue ceftriaxone 10/10 - present  - ID Consult, appreciate recommendations  - GI Consulted for possible GI source and signed off as GI source less likely  - IR consult for port removal    Culture Results:   10/8:   Staph Hominis, Gram Positive Diphtheroids from peripheral blood  Strep Salivarius, Achromobacter xylosoxidans from Cm  10/10:   Strep salivarius, Achromobacter xylosoxidans from Cm  10/11:  Achromobacter xylosoxidans from Cm   10/12:  GNRs from Cm  10/13:  Drawn    # Chronic Malnutrition on TPN  - Restart TPN  - Appreciate Nutrition input    # Anxiety and Attention Deficit Hyperactivity Disorder   - Continue PTA lorazepam  - Continue PTA amphetamine-dextroamphetamine (Adderall)    # Chronic Pain  - Continue PTA  fentanyl patch, lidocaine cream, oxycodone    # History of Cardiomyopathy  - Continue PTA carvedilol    Diet: Regular Diet Adult  parenteral nutrition - Clinimix E  parenteral nutrition - ADULT compounded formula CYCLE  Fluids: TPN  Sanchez Catheter: not present    DVT Prophylaxis: Low Risk/Ambulatory with no VTE prophylaxis indicated  Code Status: Full Code    Expected discharge: Tomorrow if cultures remain negative, recommended to prior living arrangement once antibiotic plan established.    The patient's care was discussed with the Bedside Nurse, Care Coordinator/ and Patient.    Álvaro Dickerson MD  Internal Medicine Staff Hospitalist Service  [facility]  Pager: [phone]  Please see sticky note for cross cover information    Interval History   No acute events overnight. Continues to feel rather well without fevers, chills, or sweats. Adamant that he will leave tomorrow.     4 Point Review of Systems otherwise negative.      Data reviewed today: I reviewed all medications, new labs and imaging results over the last 24 hours. I personally reviewed no images or EKG's today.    Physical Exam   Vital Signs: Temp: 97.3  F (36.3  C) Temp src: Oral BP: 105/66 Pulse: 56   Resp: 16 SpO2: 98 % O2 Device: None (Room air)    Weight: 190 lbs 14.4 oz  General Appearance: Chronically ill appearing, no acute distress, pleasant  Respiratory: CTAB  Cardiovascular: RRR, catheter site without erythema  GI: soft, nontender  Skin: pallor          Data     Recent Labs  Lab 10/12/18  0638 10/11/18  0816 10/10/18  0038   WBC 5.3 6.5 5.5   HGB 12.0* 12.8* 12.0*   MCV 96 96 96   * 112* 103*   INR 1.16*  --   --     144 143   POTASSIUM 3.6 3.6 3.6   CHLORIDE 107 107 106   CO2 26 28 29   BUN 17 10 14   CR 0.60* 0.69 0.76   ANIONGAP 9 8 9   SILAS 8.3* 8.5 8.1*   * 95 102*   ALBUMIN 3.0*  --  3.2*   PROTTOTAL 6.4*  --  6.6*   BILITOTAL 0.5  --  0.3   ALKPHOS 78  --  79   ALT 17  --  21   AST  9  --  15

## 2018-10-14 ENCOUNTER — APPOINTMENT (OUTPATIENT)
Dept: GENERAL RADIOLOGY | Facility: CLINIC | Age: 48
DRG: 315 | End: 2018-10-14
Attending: INTERNAL MEDICINE
Payer: COMMERCIAL

## 2018-10-14 ENCOUNTER — HOME INFUSION (PRE-WILLOW HOME INFUSION) (OUTPATIENT)
Dept: PHARMACY | Facility: CLINIC | Age: 48
End: 2018-10-14

## 2018-10-14 VITALS
WEIGHT: 192.9 LBS | TEMPERATURE: 98.7 F | RESPIRATION RATE: 18 BRPM | BODY MASS INDEX: 26.13 KG/M2 | SYSTOLIC BLOOD PRESSURE: 118 MMHG | DIASTOLIC BLOOD PRESSURE: 63 MMHG | HEIGHT: 72 IN | HEART RATE: 92 BPM | OXYGEN SATURATION: 97 %

## 2018-10-14 LAB
ALBUMIN SERPL-MCNC: 3.3 G/DL (ref 3.4–5)
ALP SERPL-CCNC: 85 U/L (ref 40–150)
ALT SERPL W P-5'-P-CCNC: 33 U/L (ref 0–70)
ANION GAP SERPL CALCULATED.3IONS-SCNC: 8 MMOL/L (ref 3–14)
AST SERPL W P-5'-P-CCNC: 29 U/L (ref 0–45)
BILIRUB DIRECT SERPL-MCNC: 0.1 MG/DL (ref 0–0.2)
BILIRUB SERPL-MCNC: 0.5 MG/DL (ref 0.2–1.3)
BUN SERPL-MCNC: 14 MG/DL (ref 7–30)
CALCIUM SERPL-MCNC: 8.4 MG/DL (ref 8.5–10.1)
CHLORIDE SERPL-SCNC: 106 MMOL/L (ref 94–109)
CO2 SERPL-SCNC: 27 MMOL/L (ref 20–32)
CREAT SERPL-MCNC: 0.72 MG/DL (ref 0.66–1.25)
GFR SERPL CREATININE-BSD FRML MDRD: >90 ML/MIN/1.7M2
GLUCOSE BLDC GLUCOMTR-MCNC: 120 MG/DL (ref 70–99)
GLUCOSE SERPL-MCNC: 107 MG/DL (ref 70–99)
INR PPP: 1.21 (ref 0.86–1.14)
MAGNESIUM SERPL-MCNC: 1.9 MG/DL (ref 1.6–2.3)
PHOSPHATE SERPL-MCNC: 3.6 MG/DL (ref 2.5–4.5)
POTASSIUM SERPL-SCNC: 3.9 MMOL/L (ref 3.4–5.3)
PROT SERPL-MCNC: 6.9 G/DL (ref 6.8–8.8)
SODIUM SERPL-SCNC: 141 MMOL/L (ref 133–144)

## 2018-10-14 PROCEDURE — 00000146 ZZHCL STATISTIC GLUCOSE BY METER IP

## 2018-10-14 PROCEDURE — 83735 ASSAY OF MAGNESIUM: CPT | Performed by: INTERNAL MEDICINE

## 2018-10-14 PROCEDURE — 25000132 ZZH RX MED GY IP 250 OP 250 PS 637: Performed by: STUDENT IN AN ORGANIZED HEALTH CARE EDUCATION/TRAINING PROGRAM

## 2018-10-14 PROCEDURE — 40000556 ZZH STATISTIC PERIPHERAL IV START W US GUIDANCE

## 2018-10-14 PROCEDURE — 25000128 H RX IP 250 OP 636: Performed by: INTERNAL MEDICINE

## 2018-10-14 PROCEDURE — 82248 BILIRUBIN DIRECT: CPT | Performed by: INTERNAL MEDICINE

## 2018-10-14 PROCEDURE — 80053 COMPREHEN METABOLIC PANEL: CPT | Performed by: INTERNAL MEDICINE

## 2018-10-14 PROCEDURE — 27210195 ZZH KIT POWER PICC DOUBLE LUMEN

## 2018-10-14 PROCEDURE — 36569 INSJ PICC 5 YR+ W/O IMAGING: CPT

## 2018-10-14 PROCEDURE — 40000986 XR CHEST 1 VW

## 2018-10-14 PROCEDURE — 85610 PROTHROMBIN TIME: CPT | Performed by: INTERNAL MEDICINE

## 2018-10-14 PROCEDURE — 27210577 ZZ H INTRODUCER MICRO SET

## 2018-10-14 PROCEDURE — 25000131 ZZH RX MED GY IP 250 OP 636 PS 637: Performed by: STUDENT IN AN ORGANIZED HEALTH CARE EDUCATION/TRAINING PROGRAM

## 2018-10-14 PROCEDURE — 99239 HOSP IP/OBS DSCHRG MGMT >30: CPT | Performed by: INTERNAL MEDICINE

## 2018-10-14 PROCEDURE — 25000125 ZZHC RX 250: Performed by: INTERNAL MEDICINE

## 2018-10-14 PROCEDURE — 84100 ASSAY OF PHOSPHORUS: CPT | Performed by: INTERNAL MEDICINE

## 2018-10-14 PROCEDURE — 25000132 ZZH RX MED GY IP 250 OP 250 PS 637: Performed by: INTERNAL MEDICINE

## 2018-10-14 RX ORDER — CEFTRIAXONE 2 G/1
2 INJECTION, POWDER, FOR SOLUTION INTRAMUSCULAR; INTRAVENOUS EVERY 24 HOURS
Qty: 14 EACH | Refills: 0 | Status: ON HOLD | OUTPATIENT
Start: 2018-10-14 | End: 2019-01-04

## 2018-10-14 RX ORDER — HEPARIN SODIUM,PORCINE 10 UNIT/ML
2-5 VIAL (ML) INTRAVENOUS
Status: DISCONTINUED | OUTPATIENT
Start: 2018-10-14 | End: 2018-10-14 | Stop reason: HOSPADM

## 2018-10-14 RX ORDER — LIDOCAINE 40 MG/G
CREAM TOPICAL
Status: DISCONTINUED | OUTPATIENT
Start: 2018-10-14 | End: 2018-10-14 | Stop reason: HOSPADM

## 2018-10-14 RX ADMIN — ONDANSETRON 8 MG: 4 TABLET, ORALLY DISINTEGRATING ORAL at 11:26

## 2018-10-14 RX ADMIN — CEFTRIAXONE SODIUM 2 G: 2 INJECTION, POWDER, FOR SOLUTION INTRAMUSCULAR; INTRAVENOUS at 11:26

## 2018-10-14 RX ADMIN — ACETAMINOPHEN 1000 MG: 160 SUSPENSION ORAL at 01:31

## 2018-10-14 RX ADMIN — OMEPRAZOLE 40 MG: 20 CAPSULE, DELAYED RELEASE ORAL at 07:47

## 2018-10-14 RX ADMIN — LIDOCAINE HYDROCHLORIDE 5 ML: 10 INJECTION, SOLUTION INFILTRATION; PERINEURAL at 10:42

## 2018-10-14 RX ADMIN — FENTANYL 1 PATCH: 25 PATCH, EXTENDED RELEASE TRANSDERMAL at 08:05

## 2018-10-14 RX ADMIN — SUCRALFATE 1 G: 1 SUSPENSION ORAL at 02:07

## 2018-10-14 RX ADMIN — OXYCODONE HYDROCHLORIDE 15 MG: 5 SOLUTION ORAL at 06:51

## 2018-10-14 RX ADMIN — ERGOCALCIFEROL 50000 UNITS: 1.25 CAPSULE ORAL at 07:46

## 2018-10-14 RX ADMIN — OXYCODONE HYDROCHLORIDE 15 MG: 5 SOLUTION ORAL at 02:08

## 2018-10-14 RX ADMIN — OXYCODONE HYDROCHLORIDE 15 MG: 5 SOLUTION ORAL at 11:25

## 2018-10-14 RX ADMIN — SUCRALFATE 1 G: 1 SUSPENSION ORAL at 06:50

## 2018-10-14 RX ADMIN — CARVEDILOL 6.25 MG: 6.25 TABLET, FILM COATED ORAL at 07:47

## 2018-10-14 RX ADMIN — DEXTROAMPHETAMINE SACCHARATE, AMPHETAMINE ASPARTATE, DEXTROAMPHETAMINE SULFATE AND AMPHETAMINE SULFATE 20 MG: 2.5; 2.5; 2.5; 2.5 TABLET ORAL at 07:46

## 2018-10-14 ASSESSMENT — ACTIVITIES OF DAILY LIVING (ADL)
ADLS_ACUITY_SCORE: 10
ADLS_ACUITY_SCORE: 11
ADLS_ACUITY_SCORE: 10
ADLS_ACUITY_SCORE: 10

## 2018-10-14 NOTE — PROVIDER NOTIFICATION
Medicine Gold cross-cover provider notified via web based text page regarding patient's temperature of 100.3 oral. Patient endorsed body aches and chills. PRN tylenol given; patient refused ice packs, fan and/or removal of heavy clothes. Will monitor and continue POC.

## 2018-10-14 NOTE — PROGRESS NOTES
Care Coordinator - Discharge Planning    Admission Date/Time:  10/9/2018  Attending MD:  Álvaro Dickerson, *     Data  Date of initial CC assessment:  10/11/2018  Chart reviewed, discussed with interdisciplinary team.   Patient was admitted for:   1. Recurrent bacteremia    2. On total parenteral nutrition    3. Other chronic pain    4. Bacteremia         Assessment   Full assessment completed in previous note    Coordination of Care and Referrals: Previous RNCC made referral to [facility] for TPN and IV antibiotics.    Per MD, patient will discharge today on original TPN formula and daily Rocephin. Patient will have PICC placed today.    Writer updated [facility] of anticipated discharge. TPN formula faxed. [facility] will contact patient regarding delivery of supplies and nursing visit (if needed).    Patient to receive today's dose of Rocephin prior to discharge.      Plan  Anticipated Discharge Date:  10/14/2018  Anticipated Discharge Plan:  Home with clinic follow up    CTS Handoff completed:  YES     Shadia Mayer, RNCC (weekend coverage)

## 2018-10-14 NOTE — PROVIDER NOTIFICATION
10/14/18 1100   PICC Double Lumen 10/14/18 Right Brachial vein medial   Placement Date/Time: 10/14/18 1106   Catheter Brand: [employer] picc  Size (Fr): 5 Fr  Lot #: [id number]  Full barrier precautions done: Yes, hand hygiene, sterile gown, sterile gloves, mask, cap, full body drape, chlorhexidine scrub  Consent Signed: Yes  T...   Site Assessment WDL   External Cath Length (cm) 3 cm   Extremity Circumference (cm) 29 cm   Dressing Intervention Chlorhexidine patch;Transparent;Securing device;New dressing  (secura cath)   Dressing Change Due 10/21/18   PICC Lumen Assessment Trigger Mck[place]on;Purple   Double Lumen PICC Status   Gray Lumen Status Blood return noted;Blood return on all lumens;Saline locked   Purple Lumen Status Blood return noted;Blood return on all lumens;Saline locked

## 2018-10-14 NOTE — PLAN OF CARE
Problem: Patient Care Overview  Goal: Plan of Care/Patient Progress Review  Outcome: No Change  [id number]    TMAX 100.3 oral; endorses chills and body aches; PRN tylenol given, provider notified. On RA. Alert, oriented x4. Per patient request, PRN ativan given at HS. Up independently in room; patient leaving unit to smoke. Regular diet. Intermittent nausea, no emesis; PRN Zofran 8 mg given x1. Blood sugar 120. IR removed CVC at bedside; tip cultured and peripheral BC's drawn x2. PIV replaced overnight 2/2 site pain while PPN infusing. New PIV also painful within half hour after placement; tried warm compresses but still unable to tolerate PPN (shut off since 0100). Patient refused lipids as he and wife believe lipids can not be infused into a PIV; RN attempted to educate patient/wife and printed off multiple handouts. Voiding wnl's. Patient unaware of last BM; per patient, it's normal for him to not have a BM for weeks at a time. Complaints of generalized discomfort; PRN oxycodone 15 mg given x2. Fentanyl patch remains in place on right arm. Anticipate discharge home with [facility] today after PICC placement for continued IV abx. Patient told RN he is leaving at 10:00 am sharp with or without a PICC.

## 2018-10-14 NOTE — PLAN OF CARE
Problem: Patient Care Overview  Goal: Plan of Care/Patient Progress Review  Outcome: Adequate for Discharge Date Met: 10/14/18  Time: 8786-5958     Reason for admission: Bacteriemia   Vitals: VSS  Activity: Ind  Pain: C/O generalized pain. PRN Oxy given   Neuro: A&Ox4  Cardiac: WNL  Respiratory: on RA   Diet: Regular  Lines: R arm PICC, G-tube clamped.      New changes this shift: R arm PICC placement today. PIV removed. Reviewed discharge instructions with pt and significant other. Discharging home today with [facility]

## 2018-10-14 NOTE — PROCEDURES
[facility] Brief Procedure Note    Pre-operative diagnosis: Recurrent bacteremia, Cm catheter placed last month   Post-operative diagnosis Same   Procedure: Cm catheter removal (right internal jugular vein)   Surgeon: Shanae Fong MD   Assistants(s): -   Estimated blood loss: None    Specimens: As below   Findings: Uneventful removal of right internal jugular Cm catheter.  Tip sent for culture.   Complications: None.

## 2018-10-14 NOTE — DISCHARGE SUMMARY
General acute hospital    Internal Medicine Discharge Summary- Gold Service    Date of Admission:  10/9/2018  Date of Discharge:  10/14/2018  Discharging Provider: Álvaro Dickerson  Discharge Team: Gold 7    ADDENDUM: On 10/16 blood cultures drawn immediately after Azevedo removal on 10/13 turned positive for Gram negative rods. Per patient, at that time he was febrile and felt ill. In the morning of 10/14 he felt better and requested discharge. Bacteria likely dislodged from azevedo at time of removal. Discussed with ID Fellow, continue plan of ceftriaxone for 2 weeks. Attempted to contact patient at home telephone number x3, and wife Rose on her cell phone x2, without success. Will continue to try and reach patient to ensure that he continues to feel well.    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 Surveillance for Gram Negative Rods.    Hospital Course   Parker Acevedo Sr was admitted on 10/9/2018 for fevers and myalgias.  The following problems were addressed during his hospitalization:    # Polymicrobial CLABSI - Fevers at home but afebrile on presentation without leukocytosis, tachycardia, or tachypnea. Blood cultures growing Staph Hominis, diphtheroids, Strep salivarius, and Achromobacter. Staph hominis and diphtheroids thought likely skin contaminant, however strep and achromobacter were considered pathogenic. Has had multiple line infections in the past. Infectious Disease was consulted and recommended broad spectrum antibiotics that were able to be narrowed to ceftriaxone. Gastroenterology was consulted for possible GI source of bacteremia but did  not think that bacteremia was coming from GI source. Despite antibiotics, achromobacter continued to grow from his port which prompted removal on 10/13. As his peripheral blood cultures had been negative a PICC line was placed on the day of discharge. Patient will continue IV ceftriaxone through his PICC line for 2 weeks from discharge. He will follow up with his primary care provider in 2 weeks to ensure clearance of achromobacter.      Culture Results:   10/8:   Staph Hominis, Gram Positive Diphtheroids from peripheral blood  Strep Salivarius, Achromobacter xylosoxidans from Cm  10/10:   Strep salivarius, Achromobacter xylosoxidans from Cm  10/11:  Achromobacter xylosoxidans from Cm   10/12:  Achromobacter xylosoxidans from Cm  10/13:  Pending     # Chronic Malnutrition on TPN - Iniitially held and started on peripheral formula and then restarted TPN once central access was re-established.      # Anxiety and Attention Deficit Hyperactivity Disorder   - Continued PTA lorazepam  - Continued PTA amphetamine-dextroamphetamine (Adderall)     # Chronic Pain  - Continued PTA fentanyl patch, lidocaine cream, oxycodone     # History of Cardiomyopathy  - Continued PTA carvedilol    Consultations This Hospital Stay   PHARMACY TO DOSE VANCO  NUTRITION SERVICES ADULT IP CONSULT  INFECTIOUS DISEASE GENERAL ADULT IP CONSULT  PHARMACY IP CONSULT FOR PENICILLIN ALLERGY SKIN TEST   MEDICATION HISTORY IP PHARMACY CONSULT  GI LUMINAL ADULT IP CONSULT  PHARMACY/NUTRITION TO START AND MANAGE TPN  PHARMACY IP CONSULT  VASCULAR ACCESS CARE ADULT IP CONSULT  PHARMACY/NUTRITION TO START AND MANAGE TPN  PHARMACY IP CONSULT  INTERVENTIONAL RADIOLOGY ADULT/PEDS IP CONSULT  INTERVENTIONAL RADIOLOGY ADULT/PEDS IP CONSULT  PHARMACY/NUTRITION TO START AND MANAGE TPN  PHARMACY IP CONSULT  VASCULAR ACCESS CARE ADULT IP CONSULT  VASCULAR ACCESS CARE ADULT IP CONSULT  VASCULAR ACCESS ADULT IP CONSULT    Code Status   Full  Code    Time Spent on this Encounter   I, Álvaro Dickerson, personally saw the patient today and spent greater than 30 minutes discharging this patient.       Álvaro Dickerson MD  Internal Medicine Staff Hospitalist Service  [facility]  Pager: [phone]  ______________________________________________________________________    Physical Exam   Vital Signs: Temp: 98.7  F (37.1  C) Temp src: Oral BP: 118/63 Pulse: 92   Resp: 18 SpO2: 97 % O2 Device: None (Room air)    Weight: 192 lbs 14.4 oz    General Appearance: Chronically ill appearing man in no acute distress  Respiratory: Breathing comfortably on room air  Cardiovascular: RRR, no murmurs or gallops, previous azevedo site without erythema or tenderness  GI: soft, nontender, g tube in place  Skin: pallor, no rashes    Significant Results and Procedures   Most Recent 6 Bacteria Isolates From Any Culture (See EPIC Reports for Culture Details):  Recent Labs   Lab Test  10/13/18   2356  10/13/18   2355  10/13/18   1400  10/13/18   0726  10/12/18   1216  10/12/18   0638   CULT  No growth after 6 hours  No growth after 6 hours  No growth after 15 hours  No growth after 19 hours  Cultured on the 1st day of incubation:  Gram negative rods  *  Critical Value/Significant Value, preliminary result only, called to and read back by  Jael Lanza at 0829 on 10/13/18 ac.    No growth after 2 days       Pending Results   These results will be followed up by GOLD Team  Unresulted Labs Ordered in the Past 30 Days of this Admission     Date and Time Order Name Status Description    10/13/2018 2242 Blood culture Preliminary     10/13/2018 2242 Blood culture Preliminary     10/13/2018 2242 Catheter Tip Culture Aerobic Bacterial In process     10/13/2018 1043 Blood culture Preliminary     10/13/2018 0000 Blood culture Preliminary     10/13/2018 0000 Prealbumin In process     10/12/2018 1134 Blood culture Preliminary     10/12/2018 0000 Blood culture  Preliminary     10/12/2018 0000 Blood culture Preliminary     10/11/2018 0020 Blood culture Preliminary     10/11/2018 0020 Blood culture Preliminary     10/10/2018 0010 Blood culture Preliminary     10/10/2018 0010 Blood culture Preliminary     10/8/2018 1500 BLOOD CULTURE Preliminary     10/8/2018 1500 BLOOD CULTURE Preliminary              Primary Care Physician   Esteban Daly    Discharge Disposition   Discharged to home  Condition at discharge: Stable    Discharge Orders     Home care nursing referral     Home infusion referral     Reason for your hospital stay   You were hospitalized for fevers and body aches and were found to have another blood stream infection related to your port. You were given IV antibiotics in an effort to salvage your port. Unfortunately, bacteria continued to grow through the Cm catheter and it had to be removed. A PICC line was placed and you were able to discharge to home.     Adult [facility] Follow-up and recommended labs and tests   Follow up with primary care provider, Esteban Daly, in 2 weeks, for hospital follow- up. The following labs/tests are recommended: Blood Cultures.   Follow up with Infectious Disease in 2-4 weeks.     Appointments on [place] and/or [facility] (with [facility] or [facility] provider or service). Call [phone] if you haven't heard regarding these appointments within 7 days of discharge.     Activity   Your activity upon discharge: activity as tolerated     When to contact your care team   Call your primary doctor if you have any of the following: temperature greater than 100.4, increased drainage, increased swelling or increased pain.     Full Code     Diet   Follow this diet upon discharge: Orders Placed This Encounter     Regular Diet Adult       Discharge Medications   Current Discharge Medication List      START taking these medications    Details   cefTRIAXone (ROCEPHIN) 2 GM vial Inject 2 g into the vein every 24 hours  Qty: 14 each,  Refills: 0    Associated Diagnoses: Recurrent bacteremia         CONTINUE these medications which have NOT CHANGED    Details   amphetamine-dextroamphetamine (ADDERALL) 20 MG per tablet Take 1 tablet (20 mg) by mouth daily  Qty: 30 tablet, Refills: 0    Associated Diagnoses: ADHD (attention deficit hyperactivity disorder), inattentive type      carvedilol (COREG) 6.25 MG tablet Take 6.25 mg by mouth 2 times daily (with meals)      cyanocobalamin (VITAMIN B12) 1000 MCG/ML injection Inject 1 mL (1,000 mcg) into the muscle every 30 days  Qty: 1 mL, Refills: 11      fentaNYL (DURAGESIC) 25 mcg/hr 72 hr patch Place 1 patch onto the skin every 48 hours remove old patch.  Release on/after 10/6/18 to start on/after 10/8/18.  Qty: 15 patch, Refills: 0    Associated Diagnoses: Chronic abdominal pain; Encounter for long-term opiate analgesic use      lidocaine (LIDODERM) 5 % Patch Place 1-2 patches onto the skin every 24 hours Wear for 12 hours, remove for 12 hours.  OK to cut to better fit to size.  Qty: 60 patch, Refills: 6    Associated Diagnoses: Chronic bilateral low back pain without sciatica; Chronic abdominal pain; Myofascial pain      lidocaine-prilocaine (EMLA) cream Apply topically as needed for moderate pain  Qty: 30 g, Refills: 3    Associated Diagnoses: Pain      LORazepam (ATIVAN) 1 MG tablet Take 1 tablet (1 mg) by mouth nightly as needed for anxiety or other (sleep)  Qty: 30 tablet, Refills: 0    Associated Diagnoses: Anxiety      ondansetron (ZOFRAN-ODT) 8 MG ODT tab Take 1 tablet (8 mg) by mouth every 8 hours as needed for nausea  Qty: 90 tablet, Refills: 3    Associated Diagnoses: Nausea      oxyCODONE (ROXICODONE) 5 MG/5ML solution Take 10-15 mLs (10-15 mg) by mouth every 4 hours as needed for moderate to severe pain Max of 45 mg/day this month. Weaning dose as discussed. Fill on/after 10/6/18 not to start untill 10/8/18.  Qty: 1350 mL, Refills: 0    Associated Diagnoses: Encounter for long-term opiate  "analgesic use; Chronic abdominal pain      !! parenteral nutrition - PTA/DISCHARGE ORDER TPN+lipid Formula per [facility] 10/10/18: volume 1800 mL; AA 95g/d; Dex 235g/d; Intralipid 20% 350 mL; sodium 30 mEq/d; potassium 100 mEq/d; magnesium 16 mEq/d; phosphate 50 mMol/d; infuvite adult 10 mL/d; MTE-5 3mL/d; Cl:Ace 1:2; Infuse intravenously over 14 hours Monday-Friday.    Plain TPN Formula per [facility] 10/10/18: volume 2150 mL; all other ingredients identical to lipid formula. Infuse intravenously over 14 hours Saturday and Sunday.      sodium chloride 0.9% infusion Inject 1,000-2,000 mLs into the vein as needed       vitamin D (ERGOCALCIFEROL) 81260 UNIT capsule Take 1 capsule (50,000 Units) by mouth every 7 days  Qty: 12 capsule, Refills: 3    Associated Diagnoses: Vitamin D deficiency      albuterol (PROAIR HFA/PROVENTIL HFA/VENTOLIN HFA) 108 (90 Base) MCG/ACT Inhaler Inhale 2 puffs into the lungs every 4 hours as needed for shortness of breath / dyspnea or wheezing  Qty: 1 Inhaler, Refills: 3    Associated Diagnoses: Productive cough      !! [facility] INFUSION MANAGED PATIENT Contact [facility] for patient specific medication information at [phone] on admission and discharge from the hospital.  Phones are answered 24 hours a day 7 days a week 365 days a year.    Providers - Choose \"CONTINUE HOME MED (no script)\" at discharge if patient treatment with home infusion will continue.    Comments: Resume prior to admission TPN/Lipids/saline  Associated Diagnoses: S/P bariatric surgery      Needle, Disp, (BD DISP NEEDLES) 27G X 1/2\" MISC 1 Device every 30 days Use for cyanocobalamin injection once q 30 days.  Qty: 3 each, Refills: 4    Associated Diagnoses: Bariatric surgery status      !! order for DME Equipment being ordered: Bilateral knee high chronic venous insufficiency stockings--  mild-moderate pressures.  Qty: 4 each, Refills: 5    Associated Diagnoses: Bilateral edema of lower extremity      !! " order for DME Injection Supplies for Vitamin B12: 3cc syringes w/ 27 gauge needles, 1/2 inch length  Qty: 12 each, Refills: 0    Associated Diagnoses: Bariatric surgery status      sucralfate (CARAFATE) 1 GM/10ML suspension Take 10 mLs (1 g) by mouth 4 times daily  Qty: 1200 mL, Refills: 11    Associated Diagnoses: Nausea       !! - Potential duplicate medications found. Please discuss with provider.      STOP taking these medications       acetaminophen (TYLENOL) 500 MG tablet Comments:   Reason for Stopping:             Allergies   Allergies   Allergen Reactions     Bactrim [Sulfamethoxazole W/Trimethoprim] Rash     Penicillins Anaphylaxis     Please see Antimicrobial Management Team allergy assessment note 10/10/2018. Patient reported tolerating amoxicillin.     Doxycycline Rash     Vancomycin Rash     Rash after receiving vancomycin 3/28/16 (red man's?). Tolerated with slower infusion and diphenhydramine premed.

## 2018-10-15 ENCOUNTER — TELEPHONE (OUTPATIENT)
Dept: INFECTIOUS DISEASES | Facility: CLINIC | Age: 48
End: 2018-10-15

## 2018-10-15 ENCOUNTER — HOSPITAL ENCOUNTER (OUTPATIENT)
Facility: CLINIC | Age: 48
Setting detail: SPECIMEN
Discharge: HOME | End: 2018-10-15
Admitting: SURGERY
Payer: COMMERCIAL

## 2018-10-15 ENCOUNTER — DOCUMENTATION ONLY (OUTPATIENT)
Dept: CARE COORDINATION | Facility: CLINIC | Age: 48
End: 2018-10-15

## 2018-10-15 ENCOUNTER — TELEPHONE (OUTPATIENT)
Dept: FAMILY MEDICINE | Facility: CLINIC | Age: 48
End: 2018-10-15

## 2018-10-15 ENCOUNTER — PATIENT OUTREACH (OUTPATIENT)
Dept: CARE COORDINATION | Facility: CLINIC | Age: 48
End: 2018-10-15

## 2018-10-15 ENCOUNTER — HOME INFUSION (PRE-WILLOW HOME INFUSION) (OUTPATIENT)
Dept: PHARMACY | Facility: CLINIC | Age: 48
End: 2018-10-15

## 2018-10-15 LAB
ALBUMIN SERPL-MCNC: 3.2 G/DL (ref 3.4–5)
ALP SERPL-CCNC: 80 U/L (ref 40–150)
ALT SERPL W P-5'-P-CCNC: 48 U/L (ref 0–70)
ANION GAP SERPL CALCULATED.3IONS-SCNC: 3 MMOL/L (ref 3–14)
AST SERPL W P-5'-P-CCNC: ABNORMAL U/L (ref 0–45)
BACTERIA SPEC CULT: ABNORMAL
BACTERIA SPEC CULT: NO GROWTH
BASOPHILS # BLD AUTO: 0 10E9/L (ref 0–0.2)
BASOPHILS NFR BLD AUTO: 0.4 %
BILIRUB DIRECT SERPL-MCNC: <0.1 MG/DL (ref 0–0.2)
BILIRUB SERPL-MCNC: 0.3 MG/DL (ref 0.2–1.3)
BUN SERPL-MCNC: 18 MG/DL (ref 7–30)
CALCIUM SERPL-MCNC: 8.1 MG/DL (ref 8.5–10.1)
CHLORIDE SERPL-SCNC: 107 MMOL/L (ref 94–109)
CO2 SERPL-SCNC: 30 MMOL/L (ref 20–32)
CREAT SERPL-MCNC: 0.72 MG/DL (ref 0.66–1.25)
DIFFERENTIAL METHOD BLD: ABNORMAL
EOSINOPHIL # BLD AUTO: 0.4 10E9/L (ref 0–0.7)
EOSINOPHIL NFR BLD AUTO: 5.3 %
ERYTHROCYTE [DISTWIDTH] IN BLOOD BY AUTOMATED COUNT: 13.5 % (ref 10–15)
GFR SERPL CREATININE-BSD FRML MDRD: >90 ML/MIN/1.7M2
GLUCOSE SERPL-MCNC: 81 MG/DL (ref 70–99)
HCT VFR BLD AUTO: 38.8 % (ref 40–53)
HGB BLD-MCNC: 12.2 G/DL (ref 13.3–17.7)
IMM GRANULOCYTES # BLD: 0 10E9/L (ref 0–0.4)
IMM GRANULOCYTES NFR BLD: 0.1 %
LYMPHOCYTES # BLD AUTO: 2.3 10E9/L (ref 0.8–5.3)
LYMPHOCYTES NFR BLD AUTO: 33.7 %
Lab: ABNORMAL
Lab: ABNORMAL
MAGNESIUM SERPL-MCNC: 2.1 MG/DL (ref 1.6–2.3)
MCH RBC QN AUTO: 30.6 PG (ref 26.5–33)
MCHC RBC AUTO-ENTMCNC: 31.4 G/DL (ref 31.5–36.5)
MCV RBC AUTO: 97 FL (ref 78–100)
MONOCYTES # BLD AUTO: 1.1 10E9/L (ref 0–1.3)
MONOCYTES NFR BLD AUTO: 16.5 %
NEUTROPHILS # BLD AUTO: 3 10E9/L (ref 1.6–8.3)
NEUTROPHILS NFR BLD AUTO: 44 %
NRBC # BLD AUTO: 0 10*3/UL
NRBC BLD AUTO-RTO: 0 /100
PHOSPHATE SERPL-MCNC: 3.5 MG/DL (ref 2.5–4.5)
PLATELET # BLD AUTO: 175 10E9/L (ref 150–450)
POTASSIUM SERPL-SCNC: 6 MMOL/L (ref 3.4–5.3)
PREALB SERPL IA-MCNC: 19 MG/DL (ref 15–45)
PREALB SERPL IA-MCNC: 20 MG/DL (ref 15–45)
PROT SERPL-MCNC: 7 G/DL (ref 6.8–8.8)
RBC # BLD AUTO: 3.99 10E12/L (ref 4.4–5.9)
SODIUM SERPL-SCNC: 140 MMOL/L (ref 133–144)
SPECIMEN SOURCE: ABNORMAL
SPECIMEN SOURCE: ABNORMAL
SPECIMEN SOURCE: NORMAL
TRIGL SERPL-MCNC: 102 MG/DL
WBC # BLD AUTO: 6.8 10E9/L (ref 4–11)

## 2018-10-15 PROCEDURE — 84100 ASSAY OF PHOSPHORUS: CPT | Performed by: SURGERY

## 2018-10-15 PROCEDURE — 80053 COMPREHEN METABOLIC PANEL: CPT | Performed by: SURGERY

## 2018-10-15 PROCEDURE — 85025 COMPLETE CBC W/AUTO DIFF WBC: CPT | Performed by: SURGERY

## 2018-10-15 PROCEDURE — 83735 ASSAY OF MAGNESIUM: CPT | Performed by: SURGERY

## 2018-10-15 PROCEDURE — 84134 ASSAY OF PREALBUMIN: CPT | Performed by: SURGERY

## 2018-10-15 PROCEDURE — 82248 BILIRUBIN DIRECT: CPT | Performed by: SURGERY

## 2018-10-15 PROCEDURE — 84478 ASSAY OF TRIGLYCERIDES: CPT | Performed by: SURGERY

## 2018-10-15 ASSESSMENT — ACTIVITIES OF DAILY LIVING (ADL): DEPENDENT_IADLS:: MEDICATION MANAGEMENT;TRANSPORTATION;SHOPPING

## 2018-10-15 NOTE — TELEPHONE ENCOUNTER
Reason for follow up call: Parker Acevedo Sr appeared on our list for being seen in and recenlty discharge from the Hospital.    Chief Complaint   Patient presents with     Hospital F/U     Recurrent Bacteremia       Encounter routed for Clinic RN to call for follow up

## 2018-10-15 NOTE — PROGRESS NOTES
Clinic Care Coordination Contact    Clinic Care Coordination Contact  OUTREACH    Referral Information:  Referral Source: IP Report    Primary Diagnosis: SIRS/Sepsis    Chief Complaint   Patient presents with     Clinic Care Coordination - Post Hospital     RN        [place] Utilization:   Clinic Utilization  Difficulty keeping appointments:: No  Compliance Concerns: Yes  No-Show Concerns: No  No PCP office visit in Past Year: No  Utilization    Last refreshed: 10/15/2018  8:10 AM:  No Show Count (past year) 2       Last refreshed: 10/15/2018  8:10 AM:  ED visits 4       Last refreshed: 10/15/2018  8:10 AM:  Hospital admissions 3          Current as of: 10/15/2018  8:10 AM             Clinical Concerns:  Current Medical Concerns:  RN CC spoke with patient's spouse Rose.  Patient was inpatient at [facility] 10/9/18-10/14/18 for central line associated blood stream infection, infected azevedo power port.  Patient will schedule an appointment with Dr. Isaac at [facility] for hospital follow up.  Rose is uncertain when patient needs to follow up with infectious disease and states she will discuss this when patient see Dr. Isaac.  Patient has been afebrile since home and will have home care nursing out this afternoon.  Patient Active Problem List   Diagnosis     Peptic ulcer disease     Gastric bypass status for obesity     Chronic abdominal pain     Vomiting     Anemia     ADHD (attention deficit hyperactivity disorder), inattentive type     Vitamin B12 deficiency without anemia     Thiamine deficiency     Dehydration     Dysphagia     Weight loss, non-intentional     Malnutrition (H)     Chronic anxiety     Constipation     Bile reflux esophagitis     Former smoker     Vitamin D deficiency     Iron deficiency     Health Care Home     Chronic pain     Insomnia     Positive blood culture     Fungemia     Chronic nausea     Gastrostomy tube in place (H)     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)     Short  gut syndrome     Anxiety     Status post cervical spinal arthrodesis     Port or reservoir infection, initial encounter     Abnormal echocardiogram     Low serum iron     Anemia, iron deficiency     Catheter-related bloodstream infection (CRBSI)     Generalized weakness     S/P bariatric surgery     Hyperlipidemia LDL goal <130     Bacteremia        Current Behavioral Concerns: Patient has ADHD and anxiety managed through his PCP.      Education Provided to patient: RN CC reviewed hospital discharge instructions with spouse.     Pain  Pain (GOAL):: Yes  Type: Chronic (>3mo)  Location of chronic pain:: chronic abdominal pain and lower back pain  Radiating: Yes  Progression: Constant  Limitation of routine activities due to chronic pain:: Yes  Description: Unable to perform most daily activities (chores, hobbies, social activities, driving)  Alleviating Factors: Pain Medication, Rest, Heat  Health Maintenance Reviewed: Due/Overdue   Health Maintenance Due   Topic Date Due     LIPID MONITORING Q1 YEAR  05/04/2017      Clinical Pathway: None    Medication Management:  Spouse assists with patient's medication management.    Functional Status:  Dependent ADLs:: Ambulation-cane, Bathing  Dependent IADLs:: Medication Management, Transportation, Shopping  Bed or wheelchair confined:: No  Mobility Status: Independent    Living Situation:  Current living arrangement:: I live in a private home with spouse  Type of residence:: Private home - stairs    Diet/Exercise/Sleep:  Diet:: Regular (But also has TPN)  Inadequate nutrition (GOAL):: No  Food Insecurity: No  Tube Feeding: No  Exercise:: Yes  Inadequate activity/exercise (GOAL):: No  Significant changes in sleep pattern (GOAL): No    Transportation:  Transportation concerns (GOAL):: No  Transportation means:: Regular car     Psychosocial:  Restorationism or spiritual beliefs that impact treatment:: No  Mental health DX:: Yes  Mental health DX how managed:: Medication  Mental health  management concern (GOAL):: No  Informal Support system:: Family, Spouse     Financial/Insurance:   Financial/Insurance concerns (GOAL):: No       Resources and Interventions:  Current Resources:    ;   Community Resources: Home Infusion, Home Care  Supplies used at home:: None  Equipment Currently Used at Home: cane, straight, shower chair    Advance Care Plan/Directive  Advanced Care Plans/Directives on file:: Yes  Type Advanced Care Plans/Directives: Advanced Directive - On File  Advanced Care Plan/Directive Status: Not Applicable    Referrals Placed: None     Goals:   Goals        General    Medical (pt-stated)     Notes - Note created  8/14/2018 12:26 PM by Behl, Melissa K, RN    Goal Statement: I will establish care with a new PCP prior to my current PCP's senior care.  Measure of Success: Patient will schedule an attend an appointment with a new PCP.  Supportive Steps to Achieve: RNCC reviewed [facility] locations.  Barriers: unknown  Strengths: has support from spouse  Date to Achieve By: 11/9/18  Patient expressed understanding of goal: yes                 Patient/Caregiver understanding: Spouse has fair understanding of patient's current plan of care.    Outreach Frequency: monthly  Future Appointments              In 1 week Omar Wren MD [facility] [place]    In 2 weeks Patti Milligan MD [facility] [place]  TOBIN BLAONDINA          Plan:   1. Spouse and patient will schedule an appointment with Dr. Isaac to establish care/hospital follow up.  2. Patient will continue to follow treatment plan as directed and follow up with PCP with concerns ongoing.   3. Patient will follow up with infectious disease as directed.  4. RN CC will follow up with patient in 2 weeks.    Melissa Behl BSN, RN, N  [facility] Care Coordinator  [phone]

## 2018-10-15 NOTE — PROGRESS NOTES
[facility] now requests orders and shares plan of care/discharge summaries for some patients through [facility].  Please REPLY TO THIS MESSAGE OR ROUTE BACK TO THE AUTHOR in order to give authorization for orders when needed.  This is considered a verbal order, you will still receive a faxed copy of orders for signature.  Thank you for your assistance in improving collaboration for our patients.    ORDER Resumption of Care SN 2w1, 1w8, 3 shahanans    MD SUMMARY/PLAN OF CARE      SN for PICC/labs, infection prevention education, disease mgmt/education, nutrition/hydration, GI/ status, safety education.    [facility] POC orders    CEFTRIAXONE FOR ROCEPHIN 2000 mg IV Every 24 Hours  Travasol TRAVASOL 95 GM IV via CADD Gregory 2 Times a Week Plain bag  Travasol TRAVASOL 95 GM IV via CADD Gregory 5 Times a Week Lipid bag  Fat Emulsion 325 mL Per Bag  MULTI VITAMIN, ADULT INFUVITE ADULT 10 ML IV TPN add daily  STERILE WATER FOR INJ 5 ML IV Use for flushing  ALTEPLASE 2MG  CATHFLO ACTIVASE 2 mg IV Use As Directed  NACL 0.9 percent PF 10 ML SYRINGE 10 ML IVPush Use for flushing  Normal Saline Flush  MONOJECT PHARMA GRADE FLU  1 KIT IV Use to draw labs  NACL 0.9 percent 2000 ML IV via CADD Gregory Daily  HEPARIN 10 UNITS/ML Heparin 10 UNITS/ML 5ML SYR 5 ML IV Use for flushing    Action Resumption  Anticipated length of therapy TPN, Hydration, Ceftriaxone up to one year 10/14/19  Care Coordination Visit frequency per agency care plan  Interventions  CEFTRIAXONE FOR ROCEPHIN 2000 mg IV Every 24 Hours  Travasol TRAVASOL 95 GM IV via CADD Gregory 2 Times a Week Plain bag  Travasol TRAVASOL 95 GM IV via CADD Gregory 5 Times a Week Lipid bag  Fat Emulsion 325 mL Per Bag  MULTI VITAMIN, ADULT INFUVITE ADULT 10 ML IV TPN add daily  STERILE WATER FOR INJ 5 ML IV Use for flushing  ALTEPLASE 2MG CATHFLO ACTIVASE 2 mg IV Use As Directed  NACL 0.9 percent  PF 10 ML SYRINGE 10 ML IVPush Use for flushing  Normal Saline Flush MONOJECT PHARMA  GRADE FLU 1 KIT IV Use to draw labs  NACL 0.9 percent 2000 ML IV via CADD Gregory Daily  HEPARIN 10 UNITS/ML Heparin 10 UNITS/ML 5ML SYR 5 ML IV Use for flushing    Ordered Therapies  PICC VALVED 2L  Discharge Plan To self/family care when goals met  DNR/DNI status Patient does not have a DNR/DNI order  Educate patient and caregiver on testing blood glucose. Patient to check blood glucose frequency based on clinical condition  and infusion schedule  cycle vs. continuous  [facility] Nutrition Support Team to monitor lab results and adjust HPN formulation and monitoring per [facility] Policy.  Flushing orders Per [facility] policy  HPN labs na, k, cl, cO2, glucose, bun, creat, calcium, alk phos, AST, ALT, total bilirubin, direct bilirubin, total protein, albumin, prealbumin, triglycerides, magnesium, phos, CBC with d/p.  IV Access  labs Labs may be drawn from venous catheter  IV Access, site care per [facility] protocol  IV Access, site care RN will do site care to the access device per [facility] policy and procedure  Lab draw done routinely on the following day of the week Mondays  Lab draw for the first time to be done on the following date 10/15/18  Lab draw one time order to be determined  Lab draw results to be called or faxed as follows [facility], Dr Mendez, Dr Vyas  Lab draw tests and frequency HPN panel as outlined above  Rehabilitation Potential Good  Self monitoring Patient/caregiver will be taught to monitor and record temperature  prior to the start of TPN, then one hour into the infusion.  Self Monitoring Patient/caregiver will call prescriber or I if T is elevated 2 or more degrees above normal for the patient.  Site Care supplies for access device  Supplies to administer ordered therapy per [facility] policy  Trace elements Every six months  Date Due 2/18/18 copper, manganese, selenium, zinc, ferritin, iron, c reactive protein  Use Alteplase per [facility] policy to treat clotted venous catheter. Alteplase use in pregnancy is contraindicated patient must  be  seen in controlled setting if unable to administer meds/flush IV catheter.    Thanks,   Taylor gottlieb RN  [phone]

## 2018-10-15 NOTE — PROGRESS NOTES
Patient was contacted by an RN for post DC follow up so no duplicate post DC follow up call will be made.

## 2018-10-15 NOTE — TELEPHONE ENCOUNTER
[facility] Call Center    Phone Message    May a detailed message be left on voicemail: yes    Reason for Call: Other: Patient was discharged from [facility] on 10/14/18. Per discharged instructions, patient is to follow up in clinic in 2-4 weeks. Patient was seen inpatient by Dr. Mendez for blood stream infection.     Action Taken: Message routed to:  [facility] ([facility]): Infectious Disease

## 2018-10-15 NOTE — PROGRESS NOTES
This is a recent snapshot of the patient's [facility] medical record.  For current drug dose and complete information and questions, call [phone] or In Basket pool, [facility] home infusion ([id number])  CSN Number:  [id number]

## 2018-10-15 NOTE — TELEPHONE ENCOUNTER
Reason for call:  Form  Reason for Call:  Form, our goal is to have forms completed with 72 hours, however, some forms may require a visit or additional information.    Type of letter, form or note:  Medical    Who is the form from?:  [facility]     Where did the form come from: form was faxed in    What clinic location was the form placed at?: [facility] - [phone]    Where the form was placed: 's in box     What number is listed as a contact on the form?: [phone]       Additional comments: Fax back to [phone]    Call taken on 10/15/2018 at 9:01 AM by Sesar Willett

## 2018-10-16 ENCOUNTER — HOME INFUSION (PRE-WILLOW HOME INFUSION) (OUTPATIENT)
Dept: PHARMACY | Facility: CLINIC | Age: 48
End: 2018-10-16

## 2018-10-16 LAB
BACTERIA SPEC CULT: NO GROWTH
Lab: NORMAL
SPECIMEN SOURCE: NORMAL

## 2018-10-16 NOTE — PROGRESS NOTES
This is a recent snapshot of the patient's [facility] medical record.  For current drug dose and complete information and questions, call [phone] or In Basket pool, fv home infusion ([id number])  CSN Number:  [id number]

## 2018-10-17 ENCOUNTER — HOME INFUSION (PRE-WILLOW HOME INFUSION) (OUTPATIENT)
Dept: PHARMACY | Facility: CLINIC | Age: 48
End: 2018-10-17

## 2018-10-17 LAB
BACTERIA SPEC CULT: ABNORMAL
BACTERIA SPEC CULT: NO GROWTH
Lab: ABNORMAL
Lab: NORMAL
SPECIMEN SOURCE: ABNORMAL
SPECIMEN SOURCE: NORMAL

## 2018-10-17 NOTE — PROGRESS NOTES
This is a recent snapshot of the patient's [facility] medical record.  For current drug dose and complete information and questions, call [phone] or In Basket pool, fv home infusion ([id number])  CSN Number:  [id number]

## 2018-10-18 ENCOUNTER — TELEPHONE (OUTPATIENT)
Dept: FAMILY MEDICINE | Facility: CLINIC | Age: 48
End: 2018-10-18

## 2018-10-18 LAB
BACTERIA SPEC CULT: ABNORMAL
BACTERIA SPEC CULT: ABNORMAL
BACTERIA SPEC CULT: NO GROWTH
BACTERIA SPEC CULT: NO GROWTH
Lab: ABNORMAL
Lab: NORMAL
Lab: NORMAL
SPECIMEN SOURCE: ABNORMAL
SPECIMEN SOURCE: NORMAL
SPECIMEN SOURCE: NORMAL

## 2018-10-18 NOTE — PROGRESS NOTES
This is a recent snapshot of the patient's [facility] medical record.  For current drug dose and complete information and questions, call [phone] or In Basket pool, fv home infusion ([id number])  CSN Number:  [id number]

## 2018-10-18 NOTE — TELEPHONE ENCOUNTER
Reason for Call:  Form, our goal is to have forms completed with 72 hours, however, some forms may require a visit or additional information.    Type of letter, form or note:  [facility]    Who is the form from?: Home care    Where did the form come from: form was faxed in    What clinic location was the form placed at?: [facility] - [phone]    Where the form was placed: Dr's Box    What number is listed as a contact on the form?: [phone]       Additional comments: sign, date and fax back to [phone]    Call taken on 10/18/2018 at 2:26 PM by Aparna Lund

## 2018-10-19 ENCOUNTER — TELEPHONE (OUTPATIENT)
Dept: INFECTIOUS DISEASES | Facility: CLINIC | Age: 48
End: 2018-10-19

## 2018-10-19 ENCOUNTER — MEDICAL CORRESPONDENCE (OUTPATIENT)
Dept: HEALTH INFORMATION MANAGEMENT | Facility: CLINIC | Age: 48
End: 2018-10-19

## 2018-10-19 DIAGNOSIS — R78.81 BACTEREMIA: Primary | ICD-10-CM

## 2018-10-19 RX ORDER — LEVOFLOXACIN 25 MG/ML
750 SOLUTION ORAL DAILY
Qty: 420 ML | Refills: 0 | Status: SHIPPED | OUTPATIENT
Start: 2018-10-19 | End: 2019-02-16

## 2018-10-19 NOTE — TELEPHONE ENCOUNTER
I was notified by the micro lab that positive bl cx from 10/13 has as expected revealed Achromobacter. His hospitalist team was previously made aware of this result and acted appropriately under the assumption that this reflects embolization of bacteria subsequent to Cm removal. In review, attempts have been unsuccessful to reach this patient.    Plan to continue ceftriaxone and obtain surveillance cultures 1 week after ceftriaxone completes.    The best option for this patient would be to come off TPN, but in prior discussions he has refused this option.    Chantale Mendez MD   of Medicine, Division of Infectious Diseases  [facility] [phone]

## 2018-10-19 NOTE — TELEPHONE ENCOUNTER
10/19/2018   Infectious Diseases Telephone Note:     Patient's home health nurse called today reporting low grade fevers and general malaise. His discharge blood cultures remained positive (he was unable to have a good line holiday) for Achromobacter. It is resistant to ceftriaxone. Will continue ceftriaxone to complete therapy for previous Streptococcus in blood and add oral levofloxacin to more optimally treat Achromobacter. Last QTc was <400. Home health will return to see him tomorrow and recheck blood cultures, CBC, and potassium (has been high but hemolyzed). Discussed admission with patient and we agree that he does not necessarily need admission at this time. If he develops higher fevers or additional symptoms, he will come to the ED.     Levofloxacin suspension sent to [facility] per patient request.     Марина Buckner MD  Infectious Diseases  [phone]

## 2018-10-20 LAB
BACTERIA SPEC CULT: ABNORMAL
ERYTHROCYTE [DISTWIDTH] IN BLOOD BY AUTOMATED COUNT: 13.3 % (ref 10–15)
HCT VFR BLD AUTO: 36.9 % (ref 40–53)
HGB BLD-MCNC: 12 G/DL (ref 13.3–17.7)
Lab: ABNORMAL
MCH RBC QN AUTO: 31.1 PG (ref 26.5–33)
MCHC RBC AUTO-ENTMCNC: 32.5 G/DL (ref 31.5–36.5)
MCV RBC AUTO: 96 FL (ref 78–100)
PLATELET # BLD AUTO: 185 10E9/L (ref 150–450)
POTASSIUM SERPL-SCNC: 3.9 MMOL/L (ref 3.4–5.3)
RBC # BLD AUTO: 3.86 10E12/L (ref 4.4–5.9)
SPECIMEN SOURCE: ABNORMAL
WBC # BLD AUTO: 7 10E9/L (ref 4–11)

## 2018-10-20 PROCEDURE — 84132 ASSAY OF SERUM POTASSIUM: CPT | Performed by: INTERNAL MEDICINE

## 2018-10-20 PROCEDURE — 87040 BLOOD CULTURE FOR BACTERIA: CPT | Performed by: INTERNAL MEDICINE

## 2018-10-20 PROCEDURE — 85027 COMPLETE CBC AUTOMATED: CPT | Performed by: INTERNAL MEDICINE

## 2018-10-21 LAB
BACTERIA SPEC CULT: ABNORMAL
Lab: ABNORMAL
SPECIMEN SOURCE: ABNORMAL

## 2018-10-22 ENCOUNTER — HOSPITAL ENCOUNTER (OUTPATIENT)
Dept: LAB | Facility: CLINIC | Age: 48
Discharge: HOME | End: 2018-10-22
Attending: SURGERY | Admitting: SURGERY
Payer: COMMERCIAL

## 2018-10-22 LAB
ALBUMIN SERPL-MCNC: 3.3 G/DL (ref 3.4–5)
ALP SERPL-CCNC: 77 U/L (ref 40–150)
ALT SERPL W P-5'-P-CCNC: 25 U/L (ref 0–70)
ANION GAP SERPL CALCULATED.3IONS-SCNC: 7 MMOL/L (ref 3–14)
AST SERPL W P-5'-P-CCNC: 20 U/L (ref 0–45)
BILIRUB DIRECT SERPL-MCNC: <0.1 MG/DL (ref 0–0.2)
BILIRUB SERPL-MCNC: 0.6 MG/DL (ref 0.2–1.3)
BUN SERPL-MCNC: 10 MG/DL (ref 7–30)
CALCIUM SERPL-MCNC: 8 MG/DL (ref 8.5–10.1)
CHLORIDE SERPL-SCNC: 111 MMOL/L (ref 94–109)
CO2 SERPL-SCNC: 28 MMOL/L (ref 20–32)
CREAT SERPL-MCNC: 0.55 MG/DL (ref 0.66–1.25)
DIFFERENTIAL METHOD BLD: NORMAL
ERYTHROCYTE [DISTWIDTH] IN BLOOD BY AUTOMATED COUNT: NORMAL % (ref 10–15)
GFR SERPL CREATININE-BSD FRML MDRD: >90 ML/MIN/1.7M2
GLUCOSE SERPL-MCNC: 79 MG/DL (ref 70–99)
HCT VFR BLD AUTO: NORMAL % (ref 40–53)
HGB BLD-MCNC: NORMAL G/DL (ref 13.3–17.7)
MAGNESIUM SERPL-MCNC: 1.8 MG/DL (ref 1.6–2.3)
MCH RBC QN AUTO: NORMAL PG (ref 26.5–33)
MCHC RBC AUTO-ENTMCNC: NORMAL G/DL (ref 31.5–36.5)
MCV RBC AUTO: NORMAL FL (ref 78–100)
PHOSPHATE SERPL-MCNC: 2.3 MG/DL (ref 2.5–4.5)
PLATELET # BLD AUTO: NORMAL 10E9/L (ref 150–450)
POTASSIUM SERPL-SCNC: 4.2 MMOL/L (ref 3.4–5.3)
PROT SERPL-MCNC: 6.9 G/DL (ref 6.8–8.8)
RBC # BLD AUTO: NORMAL 10E12/L (ref 4.4–5.9)
SODIUM SERPL-SCNC: 146 MMOL/L (ref 133–144)
TRIGL SERPL-MCNC: 68 MG/DL
WBC # BLD AUTO: NORMAL 10E9/L (ref 4–11)

## 2018-10-22 PROCEDURE — 82248 BILIRUBIN DIRECT: CPT | Performed by: SURGERY

## 2018-10-22 PROCEDURE — 80053 COMPREHEN METABOLIC PANEL: CPT | Performed by: SURGERY

## 2018-10-22 PROCEDURE — 83735 ASSAY OF MAGNESIUM: CPT | Performed by: SURGERY

## 2018-10-22 PROCEDURE — 84478 ASSAY OF TRIGLYCERIDES: CPT | Performed by: SURGERY

## 2018-10-22 PROCEDURE — 84134 ASSAY OF PREALBUMIN: CPT | Performed by: SURGERY

## 2018-10-22 PROCEDURE — 84100 ASSAY OF PHOSPHORUS: CPT | Performed by: SURGERY

## 2018-10-23 LAB — PREALB SERPL IA-MCNC: 25 MG/DL (ref 15–45)

## 2018-10-25 ENCOUNTER — HOME INFUSION (PRE-WILLOW HOME INFUSION) (OUTPATIENT)
Dept: PHARMACY | Facility: CLINIC | Age: 48
End: 2018-10-25

## 2018-10-26 DIAGNOSIS — E55.9 VITAMIN D DEFICIENCY: ICD-10-CM

## 2018-10-26 RX ORDER — ERGOCALCIFEROL 1.25 MG/1
CAPSULE, LIQUID FILLED ORAL
Qty: 12 CAPSULE | Refills: 3 | Status: SHIPPED | OUTPATIENT
Start: 2018-10-26 | End: 2019-02-06

## 2018-10-26 NOTE — PROGRESS NOTES
This is a recent snapshot of the patient's [facility] medical record.  For current drug dose and complete information and questions, call [phone] or In Basket pool, fv home infusion ([id number])  CSN Number:  [id number]

## 2018-10-26 NOTE — TELEPHONE ENCOUNTER
Requested Prescriptions   Pending Prescriptions Disp Refills     vitamin D (ERGOCALCIFEROL) 47992 UNIT capsule [Pharmacy Med Name: VIT D2 (ERGOCALCIF) 50,000 U] 12 capsule 3     Sig: TAKE 1 CAPSULE BY MOUTH EVERY 7 DAYS    There is no refill protocol information for this order        vitamin D (ERGOCALCIFEROL) 52955 UNIT capsule      Last Written Prescription Date:  10/03/2017  Last Fill Quantity: 12,   # refills: 3  Last Office Visit: 10/05/2018  Future Office visit:    Next 5 appointments (look out 90 days)     Oct 29, 2018 10:30 AM CDT   Return Visit with Patti Milligan MD   [facility] ([facility])    [address] [place] [id number] [phone]            Oct 30, 2018  9:00 AM CDT   [facility] Visit with [facility]uy Isaac MD   [facility]    [address] [place] [id number] [phone]                   Routing refill request to provider for review/approval because:  Drug not on the [facility], UMP or  Health refill protocol or controlled substance  Melva Alfredo, RN, BSN

## 2018-10-29 ENCOUNTER — OFFICE VISIT (OUTPATIENT)
Dept: PALLIATIVE MEDICINE | Facility: CLINIC | Age: 48
End: 2018-10-29
Payer: COMMERCIAL

## 2018-10-29 VITALS
BODY MASS INDEX: 26.96 KG/M2 | WEIGHT: 196 LBS | HEART RATE: 57 BPM | SYSTOLIC BLOOD PRESSURE: 149 MMHG | DIASTOLIC BLOOD PRESSURE: 70 MMHG

## 2018-10-29 DIAGNOSIS — G89.29 CHRONIC ABDOMINAL PAIN: ICD-10-CM

## 2018-10-29 DIAGNOSIS — Z79.891 ENCOUNTER FOR LONG-TERM OPIATE ANALGESIC USE: Primary | ICD-10-CM

## 2018-10-29 DIAGNOSIS — Z79.891 ENCOUNTER FOR LONG-TERM OPIATE ANALGESIC USE: ICD-10-CM

## 2018-10-29 DIAGNOSIS — R10.9 CHRONIC ABDOMINAL PAIN: ICD-10-CM

## 2018-10-29 PROCEDURE — 99000 SPECIMEN HANDLING OFFICE-LAB: CPT | Performed by: PSYCHIATRY & NEUROLOGY

## 2018-10-29 PROCEDURE — 99214 OFFICE O/P EST MOD 30 MIN: CPT | Performed by: PSYCHIATRY & NEUROLOGY

## 2018-10-29 PROCEDURE — 80307 DRUG TEST PRSMV CHEM ANLYZR: CPT | Mod: 90 | Performed by: PSYCHIATRY & NEUROLOGY

## 2018-10-29 RX ORDER — OXYCODONE HCL 5 MG/5 ML
10-15 SOLUTION, ORAL ORAL EVERY 4 HOURS PRN
Qty: 1350 ML | Refills: 0 | Status: SHIPPED | OUTPATIENT
Start: 2018-10-29 | End: 2018-11-23

## 2018-10-29 RX ORDER — FENTANYL 12.5 UG/1
1 PATCH TRANSDERMAL
Qty: 10 PATCH | Refills: 0 | Status: SHIPPED | OUTPATIENT
Start: 2018-10-29 | End: 2018-11-23

## 2018-10-29 ASSESSMENT — PAIN SCALES - GENERAL: PAINLEVEL: MODERATE PAIN (5)

## 2018-10-29 NOTE — PATIENT INSTRUCTIONS
1. Provide an update after ~1 week at new dose.  2. Stop at the lab today for urine drug screen.  3. Refills provided today.  4. Follow up in 3 months.    ----------------------------------------------------------------  Clinic Number:  [phone]   Call this number with any questions about your care and for scheduling assistance. Calls are returned Monday through Friday between 8 AM and 4:30 PM. We usually get back to you within 2 business days depending on the issue/request.       Medication refills:    For non-narcotic medications, call your pharmacy directly to request a refill. The pharmacy will contact the [facility] for authorization. Please allow 3-4 days for these refills to be processed.     For narcotic refills, call the clinic number or send a [employer] message. Please contact us 7-10 days before your refill is due. The message MUST include the name of the specific medication(s) requested and how you would like to receive the prescription(s). The options are as follows:    Pain Clinic staff can mail the prescription to your pharmacy. Please tell us the name of the pharmacy.    You may pick the prescription up at the Pain Clinic (tell us the location) or during a clinic visit with your pain provider    Pain Clinic staff can deliver the prescription to the [place] pharmacy in the clinic building. Please tell us the location.      We believe regular attendance is key to your success in our program.    Any time you are unable to keep your appointment we ask that you call us at least 24 hours in advance to let us know. This will allow us to offer the appointment time to another patient.

## 2018-10-29 NOTE — LETTER
[facility]    10/29/18    Patient: Parker Acevedo Sr.  YOB: 1970  Medical Record Number: [id number]                                                                  Controlled Substance Agreement  I understand that my care provider has prescribed controlled substances (narcotics, tranquilizers, and/or stimulants) to help manage my condition(s).  I am taking this medicine to help me function or work.  I know that this is strong medicine.  It could have serious side effects and even cause a dependency on the drug.  If I stop these medicines suddenly, I could have severe withdrawal symptoms.    The risks, benefits, and side effects of these medication(s) were explained to me.  I agree that:  1. I will take part in other treatments as advised by my provider.  This may be psychiatry or counseling, physical therapy, behavioral therapy, group treatment, or a referral to a pain clinic.  I will reduce or stop my medicine when my provider tells me to do so.   2. I will take my medicines as prescribed.  I will not change the dose or schedule unless my provider tells me to.  There will be no refills if I  run out early.   I may be contacted at any time without warning and asked to complete a drug test or pill count.   3. I will keep all my appointments at the clinic.  If I miss appointments or fail to follow instructions, my provider may stop my medicine.  4. I will not ask other providers to prescribe controlled substances. And I will not accept controlled substances from other people. If I need another prescribed controlled substance for a new reason, I will notify my provider within one business day.  5. If I enroll in the [place] Medical Marijuana program, I will tell my provider.  I will also sign an agreement to share my medical records with my provider.  6. I will use one pharmacy to fill all of my controlled substance prescriptions.  If my prescription is mailed to my  pharmacy, it may take 5 to 7 days for my medicine to be ready.  7. I understand that my provider, clinic care team, and pharmacy can track controlled substance prescriptions from other providers through a central database (prescription monitoring program).  8. I will bring in my list of medications (or my medicine bottles) each time I come to the clinic.  [id number] REV-  07/2018                                                                                                                                   Page 1 of 2      [facility]    10/29/18    Patient: Parker Acevedo .  YOB: 1970  Medical Record Number: [id number]    9. Refills of controlled substances will be made only during office hours.  It is up to me to make sure that I do not run out of my medicines on weekends or holidays.    10. I am responsible for my prescriptions.  If the medicine/prescription is lost or stolen, it will not be replaced.   I also agree not to share these medicines with anyone.  11. I agree to not use ANY illegal or recreational drugs.  This includes marijuana, cocaine, bath salts or other drugs.  I agree not to use alcohol unless my provider says I may.  I agree to give urine samples whenever asked.  If I fail to give a urine sample, the provider may stop my medicine.     12. I will tell my nurse or provider right away if I become pregnant or have a new medical problem treated outside of [facility].  13. I understand that this medicine can affect my thinking and judgment.  It may be unsafe for me to drive, use machinery and do dangerous tasks.  I will not do any of these things until I know how the medicine affects me.  If my dose changes, I will wait to see how it affects me.  I will contact my provider if I have concerns about medicine side effects.  I understand that if I do not follow any of the conditions above, my prescriptions or treatment may be stopped.    I agree that my  provider, clinic care team, and pharmacy may work with any city, state or federal law enforcement agency that investigates the misuse, sale, or other diversion of my controlled medicine. I will allow my provider to discuss my care with or share a copy of this agreement with any other treating provider, pharmacy or emergency room where I receive care.  I agree to give up (waive) any right of privacy or confidentiality with respect to these authorizations.   I have read this agreement and have asked questions about anything I did not understand.   ___________________________________    ___________________________  Patient Signature                                                           Date and Time  ___________________________________     ____________________________  Witness                                                                            Date and Time  ___________________________________  Patti Milligan MD  [id number] REV- 07/2018                                                                                                                                                   Page 2 of 2  Opioid Pain Medicines (also known as Narcotics)  What You Need to Know      What are opioids?   Opioids are pain medicines that must be prescribed by a doctor. Examples are:     morphine (MS Contin, Do)    oxycodone (Oxycontin)    oxycodone and acetaminophen (Percocet)    hydrocodone and acetaminophen (Vicodin, Norco)     fentanyl patch (Duragesic)     hydromorphone (Dilaudid)     methadone     What do opioids do well?   Opioids are best for short-term pain after a surgery or injury. They also work well for cancer pain. Unlike other pain medicines, they do not cause liver or kidney failure or ulcers. They may help some people with long-lasting (chronic) pain.     What do opioids NOT do well?   Opioids never get rid of pain entirely, and they do not work well for most patients with chronic pain. Opioids do not  reduce swelling, one of the causes of pain. They also don t work well for nerve pain.     Side effects  Talk to your doctor before you start or decide to keep taking one of these medicines. Side effects include:    Lowers your breathing rate enough that it could cause death    Death due to taking more than the prescribed dose    Serious lifelong opioid use      Dependence is not the same as addiction. Addiction is when people keep using a substance that harms their body, their mind or their relations with others. If you have a history of drug or alcohol abuse, taking opioids can cause a relapse.  Over time, opioids don t work as well. Most people will need higher and higher doses. The higher the dose, the more serious the side effects. We don t know the long-term effects of opioids.   People who have used opioids for a long time have a lower quality of life, worse depression, higher levels of pain and more visits to doctors.  Overdose from prescription drugs is the second leading cause of death in the [place] The risk of overdose rises when opioids are taken with other drugs such as:    Medicines used for anxiety and panic attacks (such as lorazepam, alprazolam, clonazepam    Other sedatives    Alcohol    Illegal drugs such as heroin  Never share your opioids with others. Be sure to store opioids in a secure place, locked if possible.Young children can easily swallow them and overdose.     Are there other ways to manage pain?  Ways to help reduce pain:    Exercise every day.    Treat health problems that may be causing pain.    Treat mental health problems like depression and anxiety.     Worse depression symptoms; Less pleasure in things you usually enjoy    Feeling tired or sluggish    Slower thoughts or cloudy thinking    Being more sensitive to pain over time; Pain is harder to control.    Trouble sleeping or restless sleep    Changes in hormone levels (for example, less testosterone).     Changes in sex drive or  ability to have sex    Long lasting nausea and constipation    Trouble breathing while asleep; This is worse with lung problems like COPD or sleep apnea.    Unsafe driving    Getting sick more often    Itching    Feeling dizzy    Dry mouth    Sweating    Trouble emptying the bladder (peeing). This is worse if you have an enlarged prostate or get urinary tract infections (UTIs).    What else should I know about opioids?  When someone takes opioids for too long or too often, they become dependent. This means that if you stop or reduce the medicine too quickly, you will have withdrawal symptoms.          Practice good sleep habits.  Try to go to bed and get up at the same time every day.    Stop smoking.  Tobacco use can make pain worse.    Do things that you enjoy.    Find a way to work through pain without drugs.  Try deep breathing, meditation, visual imagery and aromatherapy.    Ask your doctor to help you create a plan to manage your pain.

## 2018-10-29 NOTE — PROGRESS NOTES
[facility]    Date of visit: 10/29/2018     Chief complaint:    Chief Complaint   Patient presents with     Pain       Interval history:  Parker Acevedo was last seen by me on 6/27/18    Recommendations/plan at the last visit included:  1. Discussed again risks with opioid dosing, including fentanyl patches and fever.  New script for narcan also given.  2. Next script of fentanyl will be to 25mcg/hr.  3. This month, will try to decrease the oxycodone to 45-50mg/day. Next month, will stay at 50mg/day of oxycodone while coming down on the patch. Then the next month would be 45mg/day  If calls for procedure, likely would increase by ~10mg/day of oxycodone and keep other meds the same.  4. Follow up in 3 months.      Since his last visit, Parker Acevedo reports:    Lorazepam: Dr Daly; takes as needed for nighttime anxiety, 3-4 times per week.    -has been in the hospital, infection with the Cm which was removed, and change of the Gtube.  Has green bile drainage which is coming from the stomach-- has bags that is draining to  -will be getting a new Cm and some point, at this point PICC is not being removed.   Had recent blood cultures  - Would like to transition off of the fentanyl patches to oxycodone only, feels this would help with his anxiety control.    - Currently taking 10mg oxycodone liquid 4 times per day.  Reports it provides 80-90% pain relief within 20-30 minutes and lasts for approximately 2-3 hours  - He also reports some anxiolysis associated with oxycodone dosing.  - Patient is interested in transitioning to oxycodone only off of fentanyl patches    Pain scores:  Average pain intensity  7/10; Most severe 10/10; least severe 3/10    Current pain treatments:   Oxycodone 10-15 mg liquid by G tube QID hours, total of 55 mg daily (MEDD 82.5 mg)- this is up from previous amounts, related to recent procedures/illness  Fentanyl 25mcg/hr patch q48  hours - down from 50mcg/hr  Lidocaine patches  Naloxone- has from previous hospitalization.    Previous medication treatments included:   Valium 5 mg/day, 1 tab in AM and PM-stopped in 11/2017  Tylenol #3   Vicodin   Tramadol   Diazepam- for sleep/anxiety  Gabapentin- bad headaches, acid reflux  Oxycodone max of 45mg/day.      Side Effects: no side effects    Medications:  Current Outpatient Prescriptions   Medication Sig Dispense Refill     albuterol (PROAIR HFA/PROVENTIL HFA/VENTOLIN HFA) 108 (90 Base) MCG/ACT Inhaler Inhale 2 puffs into the lungs every 4 hours as needed for shortness of breath / dyspnea or wheezing 1 Inhaler 3     carvedilol (COREG) 6.25 MG tablet Take 6.25 mg by mouth 2 times daily (with meals)       cefTRIAXone (ROCEPHIN) 2 GM vial Inject 2 g into the vein every 24 hours 14 each 0     cyanocobalamin (VITAMIN B12) 1000 MCG/ML injection Inject 1 mL (1,000 mcg) into the muscle every 30 days 1 mL 11     fentaNYL (DURAGESIC) 12 mcg/hr 72 hr patch Place 1 patch onto the skin every 48 hours remove old patch.  Release on/after 11/5/18 to start on/after 11/7/18. 20 day supply. 10 patch 0     fentaNYL (DURAGESIC) 25 mcg/hr 72 hr patch Place 1 patch onto the skin every 48 hours remove old patch.  Release on/after 10/6/18 to start on/after 10/8/18. 15 patch 0     lidocaine (LIDODERM) 5 % Patch Place 1-2 patches onto the skin every 24 hours Wear for 12 hours, remove for 12 hours.  OK to cut to better fit to size. 60 patch 6     ondansetron (ZOFRAN-ODT) 8 MG ODT tab Take 1 tablet (8 mg) by mouth every 8 hours as needed for nausea 90 tablet 3     oxyCODONE (ROXICODONE) 5 MG/5ML solution Take 10-15 mLs (10-15 mg) by mouth every 4 hours as needed for moderate to severe pain Max of 45 mg/day this month. Weaning dose as discussed. Fill on/after 11/5/18 not to start untill 11/7/18. 1350 mL 0     sodium chloride 0.9% infusion Inject 1,000-2,000 mLs into the vein as needed        sucralfate (CARAFATE) 1 GM/10ML  "suspension Take 10 mLs (1 g) by mouth 4 times daily (Patient taking differently: Take 1 g by mouth every 4 hours as needed (EPIGASTRIC PAIN, BILE BACKUP) ) 1200 mL 11     vitamin D (ERGOCALCIFEROL) 51311 UNIT capsule TAKE 1 CAPSULE BY MOUTH EVERY 7 DAYS 12 capsule 3     amphetamine-dextroamphetamine (ADDERALL) 20 MG per tablet Take 1 tablet (20 mg) by mouth daily 30 tablet 0     [facility] INFUSION MANAGED PATIENT Contact [facility] for patient specific medication information at [phone] on admission and discharge from the hospital.  Phones are answered 24 hours a day 7 days a week 365 days a year.    Providers - Choose \"CONTINUE HOME MED (no script)\" at discharge if patient treatment with home infusion will continue.       lidocaine-prilocaine (EMLA) cream Apply topically as needed for moderate pain 30 g 3     LORazepam (ATIVAN) 1 MG tablet 1-2 mg as needed for anxiety with TPN and medications 50 tablet 0     Needle, Disp, (BD DISP NEEDLES) 27G X 1/2\" MISC 1 Device every 30 days Use for cyanocobalamin injection once q 30 days. 3 each 4     order for DME Equipment being ordered: Bilateral knee high chronic venous insufficiency stockings--  mild-moderate pressures. 4 each 5     order for DME Injection Supplies for Vitamin B12: 3cc syringes w/ 27 gauge needles, 1/2 inch length 12 each 0     parenteral nutrition - PTA/DISCHARGE ORDER TPN+lipid Formula per [facility] 10/10/18: volume 1800 mL; AA 95g/d; Dex 235g/d; Intralipid 20% 350 mL; sodium 30 mEq/d; potassium 100 mEq/d; magnesium 16 mEq/d; phosphate 50 mMol/d; infuvite adult 10 mL/d; MTE-5 3mL/d; Cl:Ace 1:2; Infuse intravenously over 14 hours Monday-Friday.    Plain TPN Formula per [facility] 10/10/18: volume 2150 mL; all other ingredients identical to lipid formula. Infuse intravenously over 14 hours Saturday and Sunday.       [DISCONTINUED] NO ACTIVE MEDICATIONS . 0 0       Medical History: any changes in medical history since they were last seen? As " above    Review of Systems:  The 14 system ROS was reviewed from the intake questionnaire, and is positive for: headache, vision changes, nosebleeds, immune deficiency, itching, swelling in feet, abdominal pain, nausea, vomiting, diarrhea, joint pain, stiffness, back pain, incontinence, anxiety.  Any bowel or bladder problems: diarrhea, bladder normal  Mood: anxiety- slightly worse    Physical Exam:  Blood pressure 149/70, pulse 57, weight 88.9 kg (196 lb).  General: awake, alert  Gait: Slow  MSK exam: hunched posture  G tube draining green fluid      THE 4 A's OF OPIOID MAINTENANCE ANALGESIA    Analgesia: good, as noted in interval history    Activity: on disability    Adverse effects: none    Adherence to Rx protocol: good- [place] Prescription Monitoring Program checked 10/29/18 appropriate      Assessment:   1. Chronic abdominal pain, with history of gastric bypass and later peptic ulcer   2. G tube placement- only used for venting  3. Myofascial abdominal pain, with significant guarding and poor posture  4. Opioid tolerance  5. Anxiety  6. Foot pain with tendonous injury- healed  7. Left arm weakness, consistent with radial nerve injury- improving  8. Port placement- h/o recurrent infections      Plan:   1. Discussed again risks with opioid dosing, including fentanyl patches and fever.    2. UDS today  3. Refills provided today  4. Follow up in 3 months.    I saw and examined the patient with the Pain Fellow/Resident. I have reviewed and agree with the resident's note and plan of care and made changes and corrections directly to the body of the note.    TIME SPENT:  BY FELLOW/RESIDENT ALONE 15 MIN  BY MYSELF AND FELLOW/RESIDENT TOGETHER 0 MIN  BY MYSELF WITHOUT THE FELLOW/RESIDENT 15 MIN    These times included 10 minutes I spent counseling him about his diagnosis and treatment options and coordination of care with the primary team    Jessica Milligan MD  [facility]

## 2018-10-29 NOTE — MR AVS SNAPSHOT
After Visit Summary   10/29/2018    Parker Acevedo Sr.    MRN: [id number]           Patient Information     Date Of Birth          1970        Visit Information        Provider Department      10/29/2018 10:30 AM Patti Milligan MD [facility]        Today's Diagnoses     Encounter for long-term opiate analgesic use        Chronic abdominal pain          Care Instructions    1. Provide an update after ~1 week at new dose.  2. Stop at the lab today for urine drug screen.  3. Refills provided today.  4. Follow up in 3 months.    ----------------------------------------------------------------  Clinic Number:  [phone]   Call this number with any questions about your care and for scheduling assistance. Calls are returned Monday through Friday between 8 AM and 4:30 PM. We usually get back to you within 2 business days depending on the issue/request.       Medication refills:    For non-narcotic medications, call your pharmacy directly to request a refill. The pharmacy will contact the [facility] for authorization. Please allow 3-4 days for these refills to be processed.     For narcotic refills, call the clinic number or send a ITelagen message. Please contact us 7-10 days before your refill is due. The message MUST include the name of the specific medication(s) requested and how you would like to receive the prescription(s). The options are as follows:    Pain Clinic staff can mail the prescription to your pharmacy. Please tell us the name of the pharmacy.    You may pick the prescription up at the Pain Clinic (tell us the location) or during a clinic visit with your pain provider    Pain Clinic staff can deliver the prescription to the [place] pharmacy in the clinic building. Please tell us the location.      We believe regular attendance is key to your success in our program.    Any time you are unable to keep your appointment we ask that you call us at  least 24 hours in advance to let us know. This will allow us to offer the appointment time to another patient.               Follow-ups after your visit        Your next 10 appointments already scheduled     Oct 30, 2018  9:00 AM CDT   Office Visit with Chuy Isaac MD   [facility] ([facility]    [address] [id number] [phone]           Bring a current list of meds and any records pertaining to this visit. For Physicals, please bring immunization records and any forms needing to be filled out. Please arrive 10 minutes early to complete paperwork.            Nov 15, 2018  9:30 AM CST   (Arrive by 9:15 AM)   Return Visit with Марина Buckner MD   [facility] ([facility])    [address] [place] [id number] [phone]              Who to contact     If you have questions or need follow up information about today's clinic visit or your schedule please contact [facility] directly at [phone].  Normal or non-critical lab and imaging results will be communicated to you by [employer], letter or phone within 4 business days after the clinic has received the results. If you do not hear from us within 7 days, please contact the clinic through [employer] or phone. If you have a critical or abnormal lab result, we will notify you by phone as soon as possible.  Submit refill requests through [employer] or call your pharmacy and they will forward the refill request to us. Please allow 3 business days for your refill to be completed.          Additional Information About Your Visit        [employer] Information     [employer] gives you secure access to your electronic health record. If you see a primary care provider, you can also send messages to your care team and make appointments. If you have questions, please call your primary care clinic.  If you do not have a primary  care provider, please call [phone] and they will assist you.        Care EveryWhere ID     This is your Care EveryWhere ID. This could be used by other organizations to access your [facility] medical records  [id number]        Your Vitals Were     Pulse BMI (Body Mass Index)                57 26.96 kg/m2           Blood Pressure from Last 3 Encounters:   10/29/18 149/70   10/14/18 118/63   10/05/18 102/60    Weight from Last 3 Encounters:   10/29/18 88.9 kg (196 lb)   10/13/18 87.5 kg (192 lb 14.4 oz)   10/05/18 86.6 kg (191 lb)              We Performed the Following     Drug Screen Comprehensive , Urine with Reported Meds (Pain Care Package)          Today's Medication Changes          These changes are accurate as of 10/29/18 10:59 AM.  If you have any questions, ask your nurse or doctor.               These medicines have changed or have updated prescriptions.        Dose/Directions    * fentaNYL 25 mcg/hr 72 hr patch   Commonly known as:  DURAGESIC   This may have changed:  Another medication with the same name was added. Make sure you understand how and when to take each.   Used for:  Chronic abdominal pain, Encounter for long-term opiate analgesic use   Changed by:  Patti Milligan MD        Dose:  1 patch   Place 1 patch onto the skin every 48 hours remove old patch.  Release on/after 10/6/18 to start on/after 10/8/18.   Quantity:  15 patch   Refills:  0       * fentaNYL 12 mcg/hr 72 hr patch   Commonly known as:  DURAGESIC   This may have changed:  You were already taking a medication with the same name, and this prescription was added. Make sure you understand how and when to take each.   Used for:  Encounter for long-term opiate analgesic use, Chronic abdominal pain   Changed by:  Patti Milligan MD        Dose:  1 patch   Place 1 patch onto the skin every 48 hours remove old patch.  Release on/after 11/5/18 to start on/after 11/7/18. 20 day supply.   Quantity:  10 patch   Refills:  0        oxyCODONE 5 MG/5ML solution   Commonly known as:  ROXICODONE   This may have changed:  additional instructions   Used for:  Encounter for long-term opiate analgesic use, Chronic abdominal pain   Changed by:  Patti Milligan MD        Dose:  10-15 mg   Take 10-15 mLs (10-15 mg) by mouth every 4 hours as needed for moderate to severe pain Max of 45 mg/day this month. Weaning dose as discussed. Fill on/after 11/5/18 not to start untill 11/7/18.   Quantity:  1350 mL   Refills:  0       sucralfate 1 GM/10ML suspension   Commonly known as:  CARAFATE   This may have changed:    - when to take this  - reasons to take this   Used for:  Nausea        Dose:  1 g   Take 10 mLs (1 g) by mouth 4 times daily   Quantity:  1200 mL   Refills:  11       * Notice:  This list has 2 medication(s) that are the same as other medications prescribed for you. Read the directions carefully, and ask your doctor or other care provider to review them with you.         Where to get your medicines      Some of these will need a paper prescription and others can be bought over the counter.  Ask your nurse if you have questions.     Bring a paper prescription for each of these medications     fentaNYL 12 mcg/hr 72 hr patch    oxyCODONE 5 MG/5ML solution               Information about OPIOIDS     PRESCRIPTION OPIOIDS: WHAT YOU NEED TO KNOW   We gave you an opioid (narcotic) pain medicine. It is important to manage your pain, but opioids are not always the best choice. You should first try all the other options your care team gave you. Take this medicine for as short a time (and as few doses) as possible.    Some activities can increase your pain, such as bandage changes or therapy sessions. It may help to take your pain medicine 30 to 60 minutes before these activities. Reduce your stress by getting enough sleep, working on hobbies you enjoy and practicing relaxation or meditation. Talk to your care team about ways to manage your pain  beyond prescription opioids.    These medicines have risks:    DO NOT drive when on new or higher doses of pain medicine. These medicines can affect your alertness and reaction times, and you could be arrested for driving under the influence (DUI). If you need to use opioids long-term, talk to your care team about driving.    DO NOT operate heavy machinery    DO NOT do any other dangerous activities while taking these medicines.    DO NOT drink any alcohol while taking these medicines.     If the opioid prescribed includes acetaminophen, DO NOT take with any other medicines that contain acetaminophen. Read all labels carefully. Look for the word  acetaminophen  or  Tylenol.  Ask your pharmacist if you have questions or are unsure.    You can get addicted to pain medicines, especially if you have a history of addiction (chemical, alcohol or substance dependence). Talk to your care team about ways to reduce this risk.    All opioids tend to cause constipation. Drink plenty of water and eat foods that have a lot of fiber, such as fruits, vegetables, prune juice, apple juice and high-fiber cereal. Take a laxative (Miralax, milk of magnesia, Colace, Senna) if you don t move your bowels at least every other day. Other side effects include upset stomach, sleepiness, dizziness, throwing up, tolerance (needing more of the medicine to have the same effect), physical dependence and slowed breathing.    Store your pills in a secure place, locked if possible. We will not replace any lost or stolen medicine. If you don t finish your medicine, please throw away (dispose) as directed by your pharmacist. The [place] [employer]ution [employer] has more information about safe disposal: https://www.pca.state.[place]/living-green/managing-unwanted-medications         Primary Care Provider Office Phone # Fax #    Esteban Daly [phone] [address] [place] [address]        [facility]        General     Medical (pt-stated)     Notes - Note created  8/14/2018 12:26 PM by Behl, Melissa K, RN    Goal Statement: I will establish care with a new PCP prior to my current PCP's [facility].  Measure of Success: Patient will schedule an attend an appointment with a new PCP.  Supportive Steps to Achieve: RNCC reviewed [facility] locations.  Barriers: unknown  Strengths: has support from spouse  Date to Achieve By: 11/9/18  Patient expressed understanding of goal: yes           Equal Access to Services     KATHRYN GUZMAN : Hadii aad ku hadasho Soomaali, waaxda luqadaha, qaybta kaalmada adeegyada, waxay idiin hayyosefn adeeg ninaдмитрийthalia lagilda . So [facility] [phone].    ATENCIÓN: Si habla español, tiene a hicks disposición servicios gratuitos de asistencia lingüística. [facility] [phone].    We comply with applicable federal civil rights laws and [place] laws. We do not discriminate on the basis of race, color, national origin, age, disability, sex, sexual orientation, or gender identity.            Thank you!     Thank you for choosing [facility]  for your care. Our goal is always to provide you with excellent care. Hearing back from our patients is one way we can continue to improve our services. Please take a few minutes to complete the written survey that you may receive in the mail after your visit with us. Thank you!             Your Updated Medication List - Protect others around you: Learn how to safely use, store and throw away your medicines at www.disposemymeds.org.          This list is accurate as of 10/29/18 10:59 AM.  Always use your most recent med list.                   Brand Name Dispense Instructions for use Diagnosis    albuterol 108 (90 Base) MCG/ACT inhaler    PROAIR HFA/PROVENTIL HFA/VENTOLIN HFA    1 Inhaler    Inhale 2 puffs into the lungs every 4 hours as needed for shortness of breath / dyspnea or wheezing    Productive cough       amphetamine-dextroamphetamine 20 MG per tablet     "ADDERALL    30 tablet    Take 1 tablet (20 mg) by mouth daily    ADHD (attention deficit hyperactivity disorder), inattentive type       carvedilol 6.25 MG tablet    COREG     Take 6.25 mg by mouth 2 times daily (with meals)        cefTRIAXone 2 GM vial    ROCEPHIN    14 each    Inject 2 g into the vein every 24 hours    Recurrent bacteremia       cyanocobalamin 1000 MCG/ML injection    VITAMIN B12    1 mL    Inject 1 mL (1,000 mcg) into the muscle every 30 days        [facility] INFUSION MANAGED PATIENT      Contact [facility] for patient specific medication information at [phone] on admission and discharge from the hospital.  Phones are answered 24 hours a day 7 days a week 365 days a year.  Providers - Choose \"CONTINUE HOME MED (no script)\" at discharge if patient treatment with home infusion will continue.    S/P bariatric surgery       * fentaNYL 25 mcg/hr 72 hr patch    DURAGESIC    15 patch    Place 1 patch onto the skin every 48 hours remove old patch.  Release on/after 10/6/18 to start on/after 10/8/18.    Chronic abdominal pain, Encounter for long-term opiate analgesic use       * fentaNYL 12 mcg/hr 72 hr patch    DURAGESIC    10 patch    Place 1 patch onto the skin every 48 hours remove old patch.  Release on/after 11/5/18 to start on/after 11/7/18. 20 day supply.    Encounter for long-term opiate analgesic use, Chronic abdominal pain       levofloxacin 25 MG/ML solution    LEVAQUIN    420 mL    Take 30 mLs (750 mg) by mouth daily for 14 days    Bacteremia       lidocaine 5 % Patch    LIDODERM    60 patch    Place 1-2 patches onto the skin every 24 hours Wear for 12 hours, remove for 12 hours.  OK to cut to better fit to size.    Chronic bilateral low back pain without sciatica, Chronic abdominal pain, Myofascial pain       lidocaine-prilocaine cream    EMLA    30 g    Apply topically as needed for moderate pain    Pain       LORazepam 1 MG tablet    ATIVAN    30 tablet    Take 1 " "tablet (1 mg) by mouth nightly as needed for anxiety or other (sleep)    Anxiety       Needle (Disp) 27G X 1/2\" Misc    BD DISP NEEDLES    3 each    1 Device every 30 days Use for cyanocobalamin injection once q 30 days.    Bariatric surgery status       ondansetron 8 MG ODT tab    ZOFRAN-ODT    90 tablet    Take 1 tablet (8 mg) by mouth every 8 hours as needed for nausea    Nausea       * order for DME     12 each    Injection Supplies for Vitamin B12: 3cc syringes w/ 27 gauge needles, 1/2 inch length    Bariatric surgery status       * order for DME     4 each    Equipment being ordered: Bilateral knee high chronic venous insufficiency stockings--  mild-moderate pressures.    Bilateral edema of lower extremity       oxyCODONE 5 MG/5ML solution    ROXICODONE    1350 mL    Take 10-15 mLs (10-15 mg) by mouth every 4 hours as needed for moderate to severe pain Max of 45 mg/day this month. Weaning dose as discussed. Fill on/after 11/5/18 not to start untill 11/7/18.    Encounter for long-term opiate analgesic use, Chronic abdominal pain       parenteral nutrition - PTA/DISCHARGE ORDER      TPN+lipid Formula per [facility] 10/10/18: volume 1800 mL; AA 95g/d; Dex 235g/d; Intralipid 20% 350 mL; sodium 30 mEq/d; potassium 100 mEq/d; magnesium 16 mEq/d; phosphate 50 mMol/d; infuvite adult 10 mL/d; MTE-5 3mL/d; Cl:Ace 1:2; Infuse intravenously over 14 hours Monday-Friday.  Plain TPN Formula per [facility] 10/10/18: volume 2150 mL; all other ingredients identical to lipid formula. Infuse intravenously over 14 hours Saturday and Sunday.        sodium chloride 0.9 % infusion      Inject 1,000-2,000 mLs into the vein as needed        sucralfate 1 GM/10ML suspension    CARAFATE    1200 mL    Take 10 mLs (1 g) by mouth 4 times daily    Nausea       vitamin D 88238 UNIT capsule    ERGOCALCIFEROL    12 capsule    TAKE 1 CAPSULE BY MOUTH EVERY 7 DAYS    Vitamin D deficiency       * Notice:  This list has 4 medication(s) that are the same as other " medications prescribed for you. Read the directions carefully, and ask your doctor or other care provider to review them with you.

## 2018-10-30 ENCOUNTER — PATIENT OUTREACH (OUTPATIENT)
Dept: CARE COORDINATION | Facility: CLINIC | Age: 48
End: 2018-10-30

## 2018-10-30 ENCOUNTER — OFFICE VISIT (OUTPATIENT)
Dept: INTERNAL MEDICINE | Facility: CLINIC | Age: 48
End: 2018-10-30
Payer: COMMERCIAL

## 2018-10-30 VITALS
HEART RATE: 84 BPM | RESPIRATION RATE: 16 BRPM | TEMPERATURE: 98.8 F | OXYGEN SATURATION: 100 % | DIASTOLIC BLOOD PRESSURE: 80 MMHG | SYSTOLIC BLOOD PRESSURE: 126 MMHG | BODY MASS INDEX: 27.78 KG/M2 | WEIGHT: 202 LBS

## 2018-10-30 DIAGNOSIS — Z98.84 S/P BARIATRIC SURGERY: ICD-10-CM

## 2018-10-30 DIAGNOSIS — E55.9 VITAMIN D DEFICIENCY: ICD-10-CM

## 2018-10-30 DIAGNOSIS — Z11.4 SCREENING FOR HIV (HUMAN IMMUNODEFICIENCY VIRUS): ICD-10-CM

## 2018-10-30 DIAGNOSIS — F90.0 ADHD (ATTENTION DEFICIT HYPERACTIVITY DISORDER), INATTENTIVE TYPE: Primary | ICD-10-CM

## 2018-10-30 DIAGNOSIS — E53.8 VITAMIN B12 DEFICIENCY WITHOUT ANEMIA: ICD-10-CM

## 2018-10-30 DIAGNOSIS — F41.9 ANXIETY: ICD-10-CM

## 2018-10-30 PROCEDURE — 99214 OFFICE O/P EST MOD 30 MIN: CPT | Performed by: INTERNAL MEDICINE

## 2018-10-30 PROCEDURE — 87389 HIV-1 AG W/HIV-1&-2 AB AG IA: CPT | Performed by: FAMILY MEDICINE

## 2018-10-30 PROCEDURE — 36415 COLL VENOUS BLD VENIPUNCTURE: CPT | Performed by: INTERNAL MEDICINE

## 2018-10-30 RX ORDER — LORAZEPAM 1 MG/1
TABLET ORAL
Qty: 50 TABLET | Refills: 0 | Status: SHIPPED | OUTPATIENT
Start: 2018-10-30 | End: 2018-11-21

## 2018-10-30 RX ORDER — DEXTROAMPHETAMINE SACCHARATE, AMPHETAMINE ASPARTATE, DEXTROAMPHETAMINE SULFATE AND AMPHETAMINE SULFATE 5; 5; 5; 5 MG/1; MG/1; MG/1; MG/1
20 TABLET ORAL DAILY
Qty: 30 TABLET | Refills: 0 | Status: SHIPPED | OUTPATIENT
Start: 2018-10-30 | End: 2018-11-21

## 2018-10-30 ASSESSMENT — ACTIVITIES OF DAILY LIVING (ADL): DEPENDENT_IADLS:: MEDICATION MANAGEMENT;TRANSPORTATION;SHOPPING

## 2018-10-30 ASSESSMENT — PAIN SCALES - GENERAL: PAINLEVEL: MILD PAIN (3)

## 2018-10-30 NOTE — MR AVS SNAPSHOT
After Visit Summary   10/30/2018    Parker Acevedo Sr.    MRN: [id number]           Patient Information     Date Of Birth          1970        Visit Information        Provider Department      10/30/2018 9:00 AM Chuy Isaac MD [facility]         Follow-ups after your visit        Your next 10 appointments already scheduled     Oct 30, 2018  9:00 AM CDT   Office Visit with Chuy Isaac MD   [facility] ([facility])    [address] [place] [id number] [phone]           Bring a current list of meds and any records pertaining to this visit. For Physicals, please bring immunization records and any forms needing to be filled out. Please arrive 10 minutes early to complete paperwork.            Nov 15, 2018  9:30 AM CST   (Arrive by 9:15 AM)   Return Visit with Марина Buckner MD   [facility] ([facility])    [address] [place] [id number] [phone]            Feb 06, 2019  1:00 PM CST   Return Visit with Patti Milligan MD   [facility] ([facility])    [address] [place] [id number] [phone]              Who to contact     If you have questions or need follow up information about today's clinic visit or your schedule please contact [facility] directly at [phone].  Normal or non-critical lab and imaging results will be communicated to you by MyChart, letter or phone within 4 business days after the clinic has received the results. If you do not hear from us within 7 days, please contact the clinic through MyChart or phone. If you have a critical or abnormal lab result, we will notify you by phone as soon as possible.  Submit refill requests through [employer] or call your pharmacy and they will forward the refill request to us. Please allow 3  business days for your refill to be completed.          Additional Information About Your Visit        MyChart Information     [employer] gives you secure access to your electronic health record. If you see a primary care provider, you can also send messages to your care team and make appointments. If you have questions, please call your primary care clinic.  If you do not have a primary care provider, please call [phone] and they will assist you.        Care EveryWhere ID     This is your Care EveryWhere ID. This could be used by other organizations to access your [place] medical records  [id number]        Your Vitals Were     Pulse Temperature Respirations Pulse Oximetry BMI (Body Mass Index)       84 98.8  F (37.1  C) (Temporal) 16 100% 27.78 kg/m2        Blood Pressure from Last 3 Encounters:   10/30/18 126/80   10/29/18 149/70   10/14/18 118/63    Weight from Last 3 Encounters:   10/30/18 202 lb (91.6 kg)   10/29/18 196 lb (88.9 kg)   10/13/18 192 lb 14.4 oz (87.5 kg)              Today, you had the following     No orders found for display         Today's Medication Changes          These changes are accurate as of 10/30/18  8:58 AM.  If you have any questions, ask your nurse or doctor.               These medicines have changed or have updated prescriptions.        Dose/Directions    sucralfate 1 GM/10ML suspension   Commonly known as:  CARAFATE   This may have changed:    - when to take this  - reasons to take this   Used for:  Nausea        Dose:  1 g   Take 10 mLs (1 g) by mouth 4 times daily   Quantity:  1200 mL   Refills:  11                Primary Care Provider Office Phone # Fax #    Esteban Daly [phone]       [address] [facility] [address]        Goals        General    Medical (pt-stated)     Notes - Note created  8/14/2018 12:26 PM by Behl, Melissa K, RN    Goal Statement: I will establish care with a new PCP prior to my current PCP's [facility].  Measure of  Success: Patient will schedule an attend an appointment with a new PCP.  Supportive Steps to Achieve: [facility].  Barriers: unknown  Strengths: has support from spouse  Date to Achieve By: 11/9/18  Patient expressed understanding of goal: yes           Equal Access to Services     KATHRYN GUZMAN AH: Hadii aad ku hadmeaghanjacob Soheike, wakevonda luqadaha, qaybta kaalmada karlaabel, carmela eligio woodдмитрийthalia rizvi. So [facility] [phone].    ATENCIÓN: Si habla español, tiene a hicks disposición servicios gratuitos de asistencia lingüística. Llame al [phone].    We comply with applicable federal civil rights laws and [place] laws. We do not discriminate on the basis of race, color, national origin, age, disability, sex, sexual orientation, or gender identity.            Thank you!     Thank you for choosing [facility]  for your care. Our goal is always to provide you with excellent care. Hearing back from our patients is one way we can continue to improve our services. Please take a few minutes to complete the written survey that you may receive in the mail after your visit with us. Thank you!             Your Updated Medication List - Protect others around you: Learn how to safely use, store and throw away your medicines at www.disposemymeds.org.          This list is accurate as of 10/30/18  8:58 AM.  Always use your most recent med list.                   Brand Name Dispense Instructions for use Diagnosis    albuterol 108 (90 Base) MCG/ACT inhaler    PROAIR HFA/PROVENTIL HFA/VENTOLIN HFA    1 Inhaler    Inhale 2 puffs into the lungs every 4 hours as needed for shortness of breath / dyspnea or wheezing    Productive cough       amphetamine-dextroamphetamine 20 MG per tablet    ADDERALL    30 tablet    Take 1 tablet (20 mg) by mouth daily    ADHD (attention deficit hyperactivity disorder), inattentive type       carvedilol 6.25 MG tablet    COREG     Take 6.25 mg by mouth  "2 times daily (with meals)        cefTRIAXone 2 GM vial    ROCEPHIN    14 each    Inject 2 g into the vein every 24 hours    Recurrent bacteremia       cyanocobalamin 1000 MCG/ML injection    VITAMIN B12    1 mL    Inject 1 mL (1,000 mcg) into the muscle every 30 days        [facility] INFUSION MANAGED PATIENT      Contact [facility] for patient specific medication information at [phone] on admission and discharge from the hospital.  Phones are answered 24 hours a day 7 days a week 365 days a year.  Providers - Choose \"CONTINUE HOME MED (no script)\" at discharge if patient treatment with home infusion will continue.    S/P bariatric surgery       * fentaNYL 25 mcg/hr 72 hr patch    DURAGESIC    15 patch    Place 1 patch onto the skin every 48 hours remove old patch.  Release on/after 10/6/18 to start on/after 10/8/18.    Chronic abdominal pain, Encounter for long-term opiate analgesic use       * fentaNYL 12 mcg/hr 72 hr patch    DURAGESIC    10 patch    Place 1 patch onto the skin every 48 hours remove old patch.  Release on/after 11/5/18 to start on/after 11/7/18. 20 day supply.    Encounter for long-term opiate analgesic use, Chronic abdominal pain       levofloxacin 25 MG/ML solution    LEVAQUIN    420 mL    Take 30 mLs (750 mg) by mouth daily for 14 days    Bacteremia       lidocaine 5 % Patch    LIDODERM    60 patch    Place 1-2 patches onto the skin every 24 hours Wear for 12 hours, remove for 12 hours.  OK to cut to better fit to size.    Chronic bilateral low back pain without sciatica, Chronic abdominal pain, Myofascial pain       lidocaine-prilocaine cream    EMLA    30 g    Apply topically as needed for moderate pain    Pain       LORazepam 1 MG tablet    ATIVAN    30 tablet    Take 1 tablet (1 mg) by mouth nightly as needed for anxiety or other (sleep)    Anxiety       Needle (Disp) 27G X 1/2\" Misc    BD DISP NEEDLES    3 each    1 Device every 30 days Use for cyanocobalamin " injection once q 30 days.    Bariatric surgery status       ondansetron 8 MG ODT tab    ZOFRAN-ODT    90 tablet    Take 1 tablet (8 mg) by mouth every 8 hours as needed for nausea    Nausea       * order for DME     12 each    Injection Supplies for Vitamin B12: 3cc syringes w/ 27 gauge needles, 1/2 inch length    Bariatric surgery status       * order for DME     4 each    Equipment being ordered: Bilateral knee high chronic venous insufficiency stockings--  mild-moderate pressures.    Bilateral edema of lower extremity       oxyCODONE 5 MG/5ML solution    ROXICODONE    1350 mL    Take 10-15 mLs (10-15 mg) by mouth every 4 hours as needed for moderate to severe pain Max of 45 mg/day this month. Weaning dose as discussed. Fill on/after 11/5/18 not to start untill 11/7/18.    Encounter for long-term opiate analgesic use, Chronic abdominal pain       parenteral nutrition - PTA/DISCHARGE ORDER      TPN+lipid Formula per [facility] 10/10/18: volume 1800 mL; AA 95g/d; Dex 235g/d; Intralipid 20% 350 mL; sodium 30 mEq/d; potassium 100 mEq/d; magnesium 16 mEq/d; phosphate 50 mMol/d; infuvite adult 10 mL/d; MTE-5 3mL/d; Cl:Ace 1:2; Infuse intravenously over 14 hours Monday-Friday.  Plain TPN Formula per [facility] 10/10/18: volume 2150 mL; all other ingredients identical to lipid formula. Infuse intravenously over 14 hours Saturday and Sunday.        sodium chloride 0.9 % infusion      Inject 1,000-2,000 mLs into the vein as needed        sucralfate 1 GM/10ML suspension    CARAFATE    1200 mL    Take 10 mLs (1 g) by mouth 4 times daily    Nausea       vitamin D 92186 UNIT capsule    ERGOCALCIFEROL    12 capsule    TAKE 1 CAPSULE BY MOUTH EVERY 7 DAYS    Vitamin D deficiency       * Notice:  This list has 4 medication(s) that are the same as other medications prescribed for you. Read the directions carefully, and ask your doctor or other care provider to review them with you.

## 2018-10-30 NOTE — PROGRESS NOTES
SUBJECTIVE:   aPrker Acevedo Sr. is a 47 year old male who presents to clinic today for the following health issues:    Chief Complaint   Patient presents with     Establish Care     multiple health conditions     Dr Daly is retiring soon, had been with him.      [facility]care and infusion once a week to check PICC, has TPN and hydration and lipids every night, 14 hours. Supervised by Dr Vyas. Not really eating much.    Primary has been adderall, zofran, b12, vitamin D, and ativan.      Dr Milligan and pain clinic, abdominal pain, has a vent to drain bloating.      Used to be on valium and got off that, still has anxiety episodes.  Using ativan 2mg now, works well, 3-4 times a week.      Adderall 20mg once a day, down from twice a day.      Patient here with his wife to establish care.  He is doing well, stable.  He had a long history of issues with his stomach after a gastric bypass surgery which had a lot of issues with ulcers and gastrectomy.  He is on chronic TPN 14 hours a day.  Lots of abdominal pain and is followed by the pain clinic.  He does have anxiety that gives him trouble at night when he took up to his TPN.    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)      Chronic abdominal pain      Complication of anesthesia      Difficulty swallowing      Gastric ulcer, unspecified as acute or chronic, without mention of hemorrhage, perforation, or obstruction      Gastro-oesophageal reflux disease      Generalized weakness 1/30/2018     Head injury      Hiatal hernia      Other bladder disorder      Other chronic pain      PONV (postoperative nausea and vomiting)      Severe malnutrition (H)     TPN     Short gut syndrome      Tobacco abuse      Current Outpatient Prescriptions  "  Medication     albuterol (PROAIR HFA/PROVENTIL HFA/VENTOLIN HFA) 108 (90 Base) MCG/ACT Inhaler     amphetamine-dextroamphetamine (ADDERALL) 20 MG per tablet     carvedilol (COREG) 6.25 MG tablet     cefTRIAXone (ROCEPHIN) 2 GM vial     cyanocobalamin (VITAMIN B12) 1000 MCG/ML injection     fentaNYL (DURAGESIC) 12 mcg/hr 72 hr patch     fentaNYL (DURAGESIC) 25 mcg/hr 72 hr patch     levofloxacin (LEVAQUIN) 25 MG/ML solution     lidocaine (LIDODERM) 5 % Patch     lidocaine-prilocaine (EMLA) cream     LORazepam (ATIVAN) 1 MG tablet     ondansetron (ZOFRAN-ODT) 8 MG ODT tab     oxyCODONE (ROXICODONE) 5 MG/5ML solution     sodium chloride 0.9% infusion     sucralfate (CARAFATE) 1 GM/10ML suspension     vitamin D (ERGOCALCIFEROL) 08525 UNIT capsule     [facility] INF[facility] MANAGED PATIENT     Needle, Disp, (BD DISP NEEDLES) 27G X 1/2\" MISC     order for DME     order for DME     parenteral nutrition - PTA/DISCHARGE ORDER     [DISCONTINUED] NO ACTIVE MEDICATIONS     No current facility-administered medications for this visit.      Social History   Substance Use Topics     Smoking status: Current Some Day Smoker     Packs/day: 0.25     Years: 3.00     Types: Cigarettes     Last attempt to quit: 7/28/2018     Smokeless tobacco: Never Used      Comment: I use an e cig every now and than     Alcohol use No      Comment: quit 2002     Review of Systems  Constitutional-Weight gain.  ENT-No earpain, sore throat, voice changes or rhinitis.  Cardiac-No chest pain or palpitations.  Respiratory-No cough, sob, or hemoptysis.  GI-chronic pain.  Musculoskeletal-No muscles aches or joint pains.    Physical Exam  /80  Pulse 84  Temp 98.8  F (37.1  C) (Temporal)  Resp 16  Wt 202 lb (91.6 kg)  SpO2 100%  BMI 27.78 kg/m2  General Appearance-healthy, alert, no distress  Right arm PICC line  Left abdomen that tube    Assessment: Is a 47-year-old gentleman who has multiple medical problems after a gastric bypass and issues with " his stomach where he basically cannot eat anything otherwise has severe nausea diarrhea.  He is TPN dependent.  This is managed by the bariatric surgeon Dr. Vyas at the [facility].  He also has chronic pain and is on fentanyl and oxycodone he has brought down the fentanyl dose after his last visit with Dr. Milligan.    The patient does have some ADHD and needs Adderall which is down from 20 mg twice a day to 20 mg once a day, I did give him a prescription for 30 pills which is started on November 8.  I will do refills every month for him for 3 months and see him every 3 months.    Anxiety-patient does not tolerate selective serotonin reuptake inhibitors.  He has been on Ativan for the last month and a half increased from 1 mg to 2 mg does have to watch this with the oxycodone.  He uses this 3-4 times a week we did have a long discussion and he will try to stay at 1 mg to get did give him 50 pills for the next month.    Patient has vitamin D and B12 replacement.    He is already received a flu shot.    Follow-up in 3 months.    I spent greater than 50% of this 40 minute visit in counseling and coordination of care of pain, GI issues, anxiety.    Electronically signed by Chuy Isaac MD

## 2018-10-30 NOTE — PROGRESS NOTES
Clinic Care Coordination Contact    Situation: Patient chart reviewed by care coordinator.    Background: RN CC reviewing chart to determine if patient established care.    Assessment: Patient was seen by Dr. Isaac today at [facility] to establish care.  No changes in plan of care at this time and patient was instructed to follow up in 3 months.     Plan/Recommendations: RN CC will follow up with patient 1 month.    Melissa Behl BSN, RN, PHN  [facility] [place] Care Coordinator  [phone]

## 2018-10-31 ENCOUNTER — HOME INFUSION (PRE-WILLOW HOME INFUSION) (OUTPATIENT)
Dept: PHARMACY | Facility: CLINIC | Age: 48
End: 2018-10-31

## 2018-10-31 ENCOUNTER — TELEPHONE (OUTPATIENT)
Dept: INFECTIOUS DISEASES | Facility: CLINIC | Age: 48
End: 2018-10-31

## 2018-10-31 LAB — HIV 1+2 AB+HIV1 P24 AG SERPL QL IA: NONREACTIVE

## 2018-10-31 NOTE — TELEPHONE ENCOUNTER
[facility] Call Center    Phone Message    May a detailed message be left on voicemail: yes    Reason for Call: Other: [facility] she needs to vertify if she needs to keep the pt on cefTRIAXone (ROCEPHIN) 2 GM vial 200ML Once a day or needs to stop... please give call back.. Thank you     Action Taken: Message routed to:  Other: Infectous Disease

## 2018-10-31 NOTE — TELEPHONE ENCOUNTER
Called [facility]. She reports pt is still getting IV Ceftriaxone and wondering if Dr Buckner is OK with them stopping today. Pt needs to keep PICC for other therapies. Dr Buckner updated.  Cristina Fregoso RN

## 2018-11-01 ENCOUNTER — HOME INFUSION (PRE-WILLOW HOME INFUSION) (OUTPATIENT)
Dept: PHARMACY | Facility: CLINIC | Age: 48
End: 2018-11-01

## 2018-11-01 NOTE — TELEPHONE ENCOUNTER
Марина Buckner MD   [facility] 8 minutes ago (2:40 PM)                 I sent a message directly to [facility] about this already so it should be taken care of. Let me know if they contact you again about it. I told them to stop his ceftriaxone.   -AML (Routing comment)

## 2018-11-01 NOTE — PROGRESS NOTES
This is a recent snapshot of the patient's [facility] medical record.  For current drug dose and complete information and questions, call [phone] or In [facility] pool, fv home infusion ([id number])  CSN Number:  [id number]

## 2018-11-02 ENCOUNTER — HOME INFUSION (PRE-WILLOW HOME INFUSION) (OUTPATIENT)
Dept: PHARMACY | Facility: CLINIC | Age: 48
End: 2018-11-02

## 2018-11-02 LAB — PAIN DRUG SCR UR W RPTD MEDS: NORMAL

## 2018-11-02 NOTE — PROGRESS NOTES
This is a recent snapshot of the patient's [facility] medical record.  For current drug dose and complete information and questions, call [phone] or In Basket pool, fv home infusion ([id number])  CSN Number:  [id number]

## 2018-11-06 ENCOUNTER — MYC REFILL (OUTPATIENT)
Dept: FAMILY MEDICINE | Facility: CLINIC | Age: 48
End: 2018-11-06

## 2018-11-06 DIAGNOSIS — R11.0 NAUSEA: ICD-10-CM

## 2018-11-07 NOTE — TELEPHONE ENCOUNTER
Message from MyChart:  Original authorizing provider: MD Parker Camara Sr. would like a refill of the following medications:  ondansetron (ZOFRAN-ODT) 8 MG ODT tab [Esteban Daly MD]    Preferred pharmacy: [address]    Comment:  Plus the heart medicine too any questions feel free to call us at [phone]

## 2018-11-08 ENCOUNTER — HOSPITAL ENCOUNTER (OUTPATIENT)
Facility: CLINIC | Age: 48
Setting detail: SPECIMEN
Discharge: HOME | End: 2018-11-08
Admitting: FAMILY MEDICINE
Payer: COMMERCIAL

## 2018-11-08 ENCOUNTER — HOME INFUSION (PRE-WILLOW HOME INFUSION) (OUTPATIENT)
Dept: PHARMACY | Facility: CLINIC | Age: 48
End: 2018-11-08

## 2018-11-08 LAB
ALBUMIN SERPL-MCNC: 3.9 G/DL (ref 3.4–5)
ALP SERPL-CCNC: 80 U/L (ref 40–150)
ALT SERPL W P-5'-P-CCNC: 32 U/L (ref 0–70)
ANION GAP SERPL CALCULATED.3IONS-SCNC: 5 MMOL/L (ref 3–14)
AST SERPL W P-5'-P-CCNC: 14 U/L (ref 0–45)
BASOPHILS # BLD AUTO: 0 10E9/L (ref 0–0.2)
BASOPHILS NFR BLD AUTO: 0.4 %
BILIRUB DIRECT SERPL-MCNC: 0.1 MG/DL (ref 0–0.2)
BILIRUB SERPL-MCNC: 0.6 MG/DL (ref 0.2–1.3)
BUN SERPL-MCNC: 12 MG/DL (ref 7–30)
CALCIUM SERPL-MCNC: 8.4 MG/DL (ref 8.5–10.1)
CHLORIDE SERPL-SCNC: 106 MMOL/L (ref 94–109)
CO2 SERPL-SCNC: 29 MMOL/L (ref 20–32)
CREAT SERPL-MCNC: 0.68 MG/DL (ref 0.66–1.25)
DIFFERENTIAL METHOD BLD: ABNORMAL
EOSINOPHIL # BLD AUTO: 0.2 10E9/L (ref 0–0.7)
EOSINOPHIL NFR BLD AUTO: 3.1 %
ERYTHROCYTE [DISTWIDTH] IN BLOOD BY AUTOMATED COUNT: 14.4 % (ref 10–15)
GFR SERPL CREATININE-BSD FRML MDRD: >90 ML/MIN/1.7M2
GLUCOSE SERPL-MCNC: 89 MG/DL (ref 70–99)
HCT VFR BLD AUTO: 41.3 % (ref 40–53)
HGB BLD-MCNC: 13.1 G/DL (ref 13.3–17.7)
IMM GRANULOCYTES # BLD: 0 10E9/L (ref 0–0.4)
IMM GRANULOCYTES NFR BLD: 0.3 %
LYMPHOCYTES # BLD AUTO: 2.5 10E9/L (ref 0.8–5.3)
LYMPHOCYTES NFR BLD AUTO: 34.2 %
MAGNESIUM SERPL-MCNC: 2 MG/DL (ref 1.6–2.3)
MCH RBC QN AUTO: 31.3 PG (ref 26.5–33)
MCHC RBC AUTO-ENTMCNC: 31.7 G/DL (ref 31.5–36.5)
MCV RBC AUTO: 99 FL (ref 78–100)
MONOCYTES # BLD AUTO: 0.5 10E9/L (ref 0–1.3)
MONOCYTES NFR BLD AUTO: 7.3 %
NEUTROPHILS # BLD AUTO: 4.1 10E9/L (ref 1.6–8.3)
NEUTROPHILS NFR BLD AUTO: 54.7 %
NRBC # BLD AUTO: 0 10*3/UL
NRBC BLD AUTO-RTO: 0 /100
PHOSPHATE SERPL-MCNC: 3.3 MG/DL (ref 2.5–4.5)
PLATELET # BLD AUTO: 106 10E9/L (ref 150–450)
PLATELET # BLD EST: ABNORMAL 10*3/UL
POTASSIUM SERPL-SCNC: 3.6 MMOL/L (ref 3.4–5.3)
PROT SERPL-MCNC: 7.1 G/DL (ref 6.8–8.8)
RBC # BLD AUTO: 4.19 10E12/L (ref 4.4–5.9)
RBC MORPH BLD: ABNORMAL
SODIUM SERPL-SCNC: 140 MMOL/L (ref 133–144)
TRIGL SERPL-MCNC: 85 MG/DL
WBC # BLD AUTO: 7.4 10E9/L (ref 4–11)

## 2018-11-08 PROCEDURE — 87040 BLOOD CULTURE FOR BACTERIA: CPT | Performed by: FAMILY MEDICINE

## 2018-11-08 PROCEDURE — 84478 ASSAY OF TRIGLYCERIDES: CPT | Performed by: FAMILY MEDICINE

## 2018-11-08 PROCEDURE — 83735 ASSAY OF MAGNESIUM: CPT | Performed by: FAMILY MEDICINE

## 2018-11-08 PROCEDURE — 82248 BILIRUBIN DIRECT: CPT | Performed by: FAMILY MEDICINE

## 2018-11-08 PROCEDURE — 84100 ASSAY OF PHOSPHORUS: CPT | Performed by: FAMILY MEDICINE

## 2018-11-08 PROCEDURE — 80053 COMPREHEN METABOLIC PANEL: CPT | Performed by: FAMILY MEDICINE

## 2018-11-08 PROCEDURE — 85025 COMPLETE CBC W/AUTO DIFF WBC: CPT | Performed by: FAMILY MEDICINE

## 2018-11-08 RX ORDER — ONDANSETRON 8 MG/1
8 TABLET, ORALLY DISINTEGRATING ORAL EVERY 8 HOURS PRN
Qty: 90 TABLET | Refills: 1 | Status: SHIPPED | OUTPATIENT
Start: 2018-11-08 | End: 2019-02-08

## 2018-11-08 NOTE — TELEPHONE ENCOUNTER
"Prescription approved per RN refill protocol.  Anastasiia Chapa RN, BSN      Zofran  Last Written Prescription Date:  4/4/2018  Last Fill Quantity: 90,  # refills: 3   Last office visit: 10/5/2018 with prescribing provider:  10/5/2018   Future Office Visit:   Next 5 appointments (look out 90 days)     Feb 06, 2019  1:00 PM CST   Return Visit with Patti Milligan MD   [facility] ([facility]    [address] [facility] [id number] [phone]                   Requested Prescriptions   Pending Prescriptions Disp Refills     ondansetron (ZOFRAN-ODT) 8 MG ODT tab 90 tablet 3     Sig: Take 1 tablet (8 mg) by mouth every 8 hours as needed for nausea     Antivertigo/Antiemetic Agents Passed    11/7/2018  8:03 AM       Passed - Recent (12 mo) or future (30 days) visit within the authorizing provider's specialty    Patient had office visit in the last 12 months or has a visit in the next 30 days with authorizing provider or within the authorizing provider's specialty.  See \"Patient Info\" tab in inbasket, or \"Choose Columns\" in Meds & Orders section of the refill encounter.             Passed - Patient is 18 years of age or older        Anastasiia Chapa RN on 11/8/2018 at 10:59 AM  "

## 2018-11-09 ENCOUNTER — HOME INFUSION (PRE-WILLOW HOME INFUSION) (OUTPATIENT)
Dept: PHARMACY | Facility: CLINIC | Age: 48
End: 2018-11-09

## 2018-11-09 NOTE — PROGRESS NOTES
This is a recent snapshot of the patient's [facility] medical record.  For current drug dose and complete information and questions, call [phone] or In Basket pool, fv home infusion ([id number])  CSN Number:  [id number]

## 2018-11-11 ENCOUNTER — HOME INFUSION (PRE-WILLOW HOME INFUSION) (OUTPATIENT)
Dept: PHARMACY | Facility: CLINIC | Age: 48
End: 2018-11-11

## 2018-11-12 ENCOUNTER — HOME INFUSION (PRE-WILLOW HOME INFUSION) (OUTPATIENT)
Dept: PHARMACY | Facility: CLINIC | Age: 48
End: 2018-11-12

## 2018-11-12 NOTE — PROGRESS NOTES
This is a recent snapshot of the patient's [facility] medical record.  For current drug dose and complete information and questions, call [phone] or In Basket pool, fv home infusion ([id number])  CSN Number:  [id number]

## 2018-11-12 NOTE — PROGRESS NOTES
This is a recent snapshot of the patient's [facility] medical record.  For current drug dose and complete information and questions, call [phone] or In Basket pool, fv home infusion ([id number])  CSN Number:  [id number]

## 2018-11-13 NOTE — PROGRESS NOTES
This is a recent snapshot of the patient's [facility] medical record.  For current drug dose and complete information and questions, call [phone] or In Basket pool, fv home infusion ([id number])  CSN Number:  [id number]

## 2018-11-15 ENCOUNTER — MYC MEDICAL ADVICE (OUTPATIENT)
Dept: PALLIATIVE MEDICINE | Facility: CLINIC | Age: 48
End: 2018-11-15

## 2018-11-15 DIAGNOSIS — G89.29 CHRONIC ABDOMINAL PAIN: ICD-10-CM

## 2018-11-15 DIAGNOSIS — Z79.891 ENCOUNTER FOR LONG-TERM OPIATE ANALGESIC USE: ICD-10-CM

## 2018-11-15 DIAGNOSIS — R10.9 CHRONIC ABDOMINAL PAIN: ICD-10-CM

## 2018-11-15 NOTE — TELEPHONE ENCOUNTER
From: Parker Acevedo Sr.      Created: 11/15/2018 12:47 PM        *-*-*This message has not been handled.*-*-*    Need to go back to the 25mg patch I get to much pain with the 12mg and keep the oxycodone at 45mg that is what really works the best if you have any questions feel free to call us at [phone] Thank you so much The 12mg just don't work            Will forward request to Dr. Milligan.    Skinny Kim, RN  Care Coordinator   [facility] [place]

## 2018-11-16 NOTE — TELEPHONE ENCOUNTER
Parker,  I need more information.    We would be making this change with the next refill.  - is that fine to manage with your meds until then?  We do want to try to come down some--- do you think we could further decrease the oxycodone lower than 45mg?  How about 40mg.  (this would be 10mg per dose, taken 4 times per day).    Jessica Milligan MD  [facility] Parker,  I need more information.    We would be making this change with the next refill.  - is that fine to manage with your meds until then?  We do want to try to come down some--- do you think we could further decrease the oxycodone lower than 45mg?  How about 40mg.  (this would be 10mg per dose, taken 4 times per day).    Jessica Milligan MD  [facility]

## 2018-11-19 ENCOUNTER — HOSPITAL ENCOUNTER (OUTPATIENT)
Facility: CLINIC | Age: 48
Setting detail: SPECIMEN
Discharge: HOME | End: 2018-11-19
Admitting: SURGERY
Payer: COMMERCIAL

## 2018-11-19 ENCOUNTER — HOME INFUSION (PRE-WILLOW HOME INFUSION) (OUTPATIENT)
Dept: PHARMACY | Facility: CLINIC | Age: 48
End: 2018-11-19

## 2018-11-19 LAB
ALBUMIN SERPL-MCNC: 3.6 G/DL (ref 3.4–5)
ALP SERPL-CCNC: 80 U/L (ref 40–150)
ALT SERPL W P-5'-P-CCNC: 19 U/L (ref 0–70)
ANION GAP SERPL CALCULATED.3IONS-SCNC: 6 MMOL/L (ref 3–14)
AST SERPL W P-5'-P-CCNC: 18 U/L (ref 0–45)
BASOPHILS # BLD AUTO: 0 10E9/L (ref 0–0.2)
BASOPHILS NFR BLD AUTO: 0.2 %
BILIRUB DIRECT SERPL-MCNC: 0.1 MG/DL (ref 0–0.2)
BILIRUB SERPL-MCNC: 0.8 MG/DL (ref 0.2–1.3)
BUN SERPL-MCNC: 9 MG/DL (ref 7–30)
CALCIUM SERPL-MCNC: 8.2 MG/DL (ref 8.5–10.1)
CHLORIDE SERPL-SCNC: 109 MMOL/L (ref 94–109)
CO2 SERPL-SCNC: 28 MMOL/L (ref 20–32)
CREAT SERPL-MCNC: 0.54 MG/DL (ref 0.66–1.25)
DIFFERENTIAL METHOD BLD: ABNORMAL
EOSINOPHIL # BLD AUTO: 0.1 10E9/L (ref 0–0.7)
EOSINOPHIL NFR BLD AUTO: 0.7 %
ERYTHROCYTE [DISTWIDTH] IN BLOOD BY AUTOMATED COUNT: 13.9 % (ref 10–15)
GFR SERPL CREATININE-BSD FRML MDRD: >90 ML/MIN/1.7M2
GLUCOSE SERPL-MCNC: 91 MG/DL (ref 70–99)
HCT VFR BLD AUTO: 39.5 % (ref 40–53)
HGB BLD-MCNC: 12.2 G/DL (ref 13.3–17.7)
IMM GRANULOCYTES # BLD: 0 10E9/L (ref 0–0.4)
IMM GRANULOCYTES NFR BLD: 0.2 %
LYMPHOCYTES # BLD AUTO: 1.4 10E9/L (ref 0.8–5.3)
LYMPHOCYTES NFR BLD AUTO: 14.3 %
MAGNESIUM SERPL-MCNC: 1.9 MG/DL (ref 1.6–2.3)
MCH RBC QN AUTO: 30.2 PG (ref 26.5–33)
MCHC RBC AUTO-ENTMCNC: 30.9 G/DL (ref 31.5–36.5)
MCV RBC AUTO: 98 FL (ref 78–100)
MONOCYTES # BLD AUTO: 1 10E9/L (ref 0–1.3)
MONOCYTES NFR BLD AUTO: 11 %
NEUTROPHILS # BLD AUTO: 7 10E9/L (ref 1.6–8.3)
NEUTROPHILS NFR BLD AUTO: 73.6 %
NRBC # BLD AUTO: 0 10*3/UL
NRBC BLD AUTO-RTO: 0 /100
PHOSPHATE SERPL-MCNC: 2.1 MG/DL (ref 2.5–4.5)
PLATELET # BLD AUTO: 139 10E9/L (ref 150–450)
POTASSIUM SERPL-SCNC: 4.1 MMOL/L (ref 3.4–5.3)
PROT SERPL-MCNC: 7.2 G/DL (ref 6.8–8.8)
RBC # BLD AUTO: 4.04 10E12/L (ref 4.4–5.9)
SODIUM SERPL-SCNC: 143 MMOL/L (ref 133–144)
TRIGL SERPL-MCNC: 49 MG/DL
WBC # BLD AUTO: 9.5 10E9/L (ref 4–11)

## 2018-11-19 PROCEDURE — 85025 COMPLETE CBC W/AUTO DIFF WBC: CPT | Performed by: SURGERY

## 2018-11-19 PROCEDURE — 83735 ASSAY OF MAGNESIUM: CPT | Performed by: SURGERY

## 2018-11-19 PROCEDURE — 84100 ASSAY OF PHOSPHORUS: CPT | Performed by: SURGERY

## 2018-11-19 PROCEDURE — 82248 BILIRUBIN DIRECT: CPT | Performed by: SURGERY

## 2018-11-19 PROCEDURE — 80053 COMPREHEN METABOLIC PANEL: CPT | Performed by: SURGERY

## 2018-11-19 PROCEDURE — 84478 ASSAY OF TRIGLYCERIDES: CPT | Performed by: SURGERY

## 2018-11-19 NOTE — TELEPHONE ENCOUNTER
From: Parker Acevedo Sr.        Created: 11/19/2018 2:26 PM       No can not come down on oxycodone I get to much pain if you want you could get rid of the fentynal patch and go up on oxycodone any further questions please feel free to call and just to let you know that he asperated on early Sunday morning and had extra pain over  the weekend the fentynal patch just don't seem to be working at all          From: Parker Acevedo Sr.        Created: 11/19/2018 2:28 PM       I will need to reorder my oxycodone and fentynal patches for November 30th thank you and they can be mailed to [facility]        Will wait for Dr. Milligan to review patient response prior to processing refills.    YADIRA LazarN, RN  Care Coordinator  [facility] [place]

## 2018-11-20 ENCOUNTER — HOME INFUSION (PRE-WILLOW HOME INFUSION) (OUTPATIENT)
Dept: PHARMACY | Facility: CLINIC | Age: 48
End: 2018-11-20

## 2018-11-20 NOTE — PROGRESS NOTES
This is a recent snapshot of the patient's [facility] medical record.  For current drug dose and complete information and questions, call [phone] or In Basket pool, fv home infusion ([id number])  CSN Number:  [id number]

## 2018-11-21 ENCOUNTER — MYC REFILL (OUTPATIENT)
Dept: FAMILY MEDICINE | Facility: CLINIC | Age: 48
End: 2018-11-21

## 2018-11-21 ENCOUNTER — MYC REFILL (OUTPATIENT)
Dept: INTERNAL MEDICINE | Facility: CLINIC | Age: 48
End: 2018-11-21

## 2018-11-21 DIAGNOSIS — F90.0 ADHD (ATTENTION DEFICIT HYPERACTIVITY DISORDER), INATTENTIVE TYPE: ICD-10-CM

## 2018-11-21 DIAGNOSIS — F41.9 ANXIETY: ICD-10-CM

## 2018-11-21 DIAGNOSIS — E53.8 VITAMIN B12 DEFICIENCY (NON ANEMIC): Primary | ICD-10-CM

## 2018-11-23 RX ORDER — OXYCODONE HCL 5 MG/5 ML
10-15 SOLUTION, ORAL ORAL EVERY 4 HOURS PRN
Qty: 1350 ML | Refills: 0 | Status: SHIPPED | OUTPATIENT
Start: 2018-11-23 | End: 2018-11-23

## 2018-11-23 RX ORDER — CYANOCOBALAMIN 1000 UG/ML
1 INJECTION, SOLUTION INTRAMUSCULAR; SUBCUTANEOUS
Qty: 1 ML | Refills: 11 | Status: SHIPPED | OUTPATIENT
Start: 2018-11-23 | End: 2019-02-06

## 2018-11-23 RX ORDER — OXYCODONE HCL 5 MG/5 ML
10-15 SOLUTION, ORAL ORAL EVERY 4 HOURS PRN
Qty: 1800 ML | Refills: 0 | Status: SHIPPED | OUTPATIENT
Start: 2018-11-23 | End: 2018-12-14

## 2018-11-23 NOTE — TELEPHONE ENCOUNTER
Mailed Rx to the [facility] in [place], pt is informed via [employer] Chatman MA  [facility]\

## 2018-11-23 NOTE — TELEPHONE ENCOUNTER
[facility]  Original authorizing provider: MD Parker Camara Sr. would like a refill of the following medications:  cyanocobalamin (VITAMIN B12) 1000 MCG/ML injection [Esteban Daly MD]    Preferred pharmacy: [address]    Comment:  Dr Isaac it is all do on the 30th of November    Medication renewals requested in this message routed to other providers:  LORazepam (ATIVAN) 1 MG tablet [Chuy Isaac MD]  amphetamine-dextroamphetamine (ADDERALL) 20 MG per tablet [Chuy Isaac MD]

## 2018-11-23 NOTE — TELEPHONE ENCOUNTER
"Last Written Prescription Date:  10/15/18  Last Fill Quantity: 1 mL,  # refills: 11   Last office visit: 10/5/2018 with prescribing provider:  Esteban Daly   Future Office Visit:   Next 5 appointments (look out 90 days)     Feb 06, 2019  1:00 PM CST   Return Visit with Patti Milligan MD   [facility] ([facility])    [address] [facility] [address]   [phone]                 Requested Prescriptions   Pending Prescriptions Disp Refills     cyanocobalamin (VITAMIN B12) 1000 MCG/ML injection 1 mL 11     Sig: Inject 1 mL (1,000 mcg) into the muscle every 30 days    Vitamin Supplements (Adult) Protocol Passed    11/23/2018  8:06 AM       Passed - High dose Vitamin D not ordered       Passed - Recent (12 mo) or future (30 days) visit within the authorizing provider's specialty    Patient had office visit in the last 12 months or has a visit in the next 30 days with authorizing provider or within the authorizing provider's specialty.  See \"Patient Info\" tab in inbasket, or \"Choose Columns\" in Meds & Orders section of the refill encounter.              Prescription approved per [facility] Refill Protocol.    Cinthya Jamies RN      "

## 2018-11-23 NOTE — PROGRESS NOTES
This is a recent snapshot of the patient's [facility] medical record.  For current drug dose and complete information and questions, call [phone] or In [facility] pool, fv home infusion ([id number])  CSN Number:  [id number]

## 2018-11-23 NOTE — TELEPHONE ENCOUNTER
Received call from patient requesting refill(s) of oxyCODONE (ROXICODONE) 5 MG/5ML solution  AND fentaNYL (DURAGESIC) 12 mcg/hr 72 hr patch    Last picked up from pharmacy on 11/5/18    Pt last seen by prescribing provider on 10/29/18  Next appt scheduled for 2/6/19     checked in the past 6 months? Yes If no, print current report and give to RN    Last urine drug screen date 10/29/18  Current opioid agreement on file (completed within the last year) Yes Date of opioid agreement: 10/29/18    [facility] [place], [place] - [address] [place] [address]  [address] [facility] [address]  Phone: [phone] Fax: [phone]    Corky Chatman MA  [facility]    Will route to nursing pool for review and preparation of prescription(s).

## 2018-11-23 NOTE — TELEPHONE ENCOUNTER
Routed to [place] pool to gather required information for opioid refill.    Routing to provider to review Mychart below from patient regarding taper. Nursing to process scripts accordingly.     Jael MAYN, RN Care Coordinator  [facility]

## 2018-11-23 NOTE — TELEPHONE ENCOUNTER
Talked with Parker and his wife on the phone.  He had aspiration and had worsening of stomach pain- used more of med- he did have his wife reach out to let us know, but the message did not tell us what he was doing for dosing.    I advised that he needs to ask for how to make changes, not just an update that he has done them.  He thought he would be ready for new med next weekend, but did talk to him that he shouldn't be due until 11/7.  He can't easily quantify as it is liquid.    He wants to try oxycodone alone.    Plan:  1. Keep on the fentanyl p12mcg/hr patch next 2 days, and use oxycodone at same doses, but max of 50mg/day   2. When fentanyl patch is done,  remove the fentanyl patch and stop.  3. At this point, you can use oxycodone 10-15mg every 4 hours, max of 60mg/day.  Caution for sedation as the fentanyl patch needs 12-18 hours to get out of your system.  4. Send a mychart on the date that you start the medication- so we can keep track of that.  5. Call when having problems to get instructions for any changes.  Do not make changes on your own.    Parker (and Rose),    Plan:  1. Keep on the fentanyl p12mcg/hr patch next 2 days, and use oxycodone at same doses, but max of 50mg/day   2. When fentanyl patch is done,  remove the fentanyl patch and stop.  3. At this point, you can use oxycodone 10-15mg every 4 hours, max of 60mg/day.  Caution for sedation as the fentanyl patch needs 12-18 hours to get out of your system.  4. Send a mychart on the date that you start the medication- so we can keep track of that.  5. Call when having problems to get instructions for any changes.  Do not make changes on your own.    You may have insurance issues trying to fill the oxycodone early.  Let me know if this is an issue.    Jessica Milligan MD  [facility]    Signed Prescriptions:                        Disp   Refills    oxyCODONE (ROXICODONE) 5 MG/5ML solution   1800 mL0        Sig: Take 10-15 mLs (10-15 mg) by  mouth every 4 hours as           needed for moderate to severe pain Max of 60           mg/day this month. Stop fentanyl patch.  Fill           on/after 11/28/18 not to start untill 11/29/18.           Early refill due to change in dosing.  Authorizing Provider: BRANDYN JENKINS

## 2018-11-23 NOTE — TELEPHONE ENCOUNTER
Message from [place]:  Original authorizing provider: MD Parker Rivera Sr. would like a refill of the following medications:  LORazepam (ATIVAN) 1 MG tablet [Chuy Isaac MD]  amphetamine-dextroamphetamine (ADDERALL) 20 MG per tablet [Chuy Isaac MD]    Preferred pharmacy: [address]    Comment:  Dr Isaac it is all do on the 30th of November    Medication renewals requested in this message routed to other providers:  cyanocobalamin (VITAMIN B12) 1000 MCG/ML injection [Esteban Daly MD]

## 2018-11-23 NOTE — TELEPHONE ENCOUNTER
lorazapam  Last Written Prescription Date:  10/30/2018  Last Fill Quantity: 50,  # refills: 0   Last office visit: 10/30/2018 with prescribing provider:      Future Office Visit:   Next 5 appointments (look out 90 days)     2019  1:00 PM CST   Return Visit with Patti Milligan MD   [facility] ([facility])    [address] [facility] [id number] [phone]                   Requested Prescriptions   Pending Prescriptions Disp Refills     LORazepam (ATIVAN) 1 MG tablet 50 tablet 0     Si-2 mg as needed for anxiety with TPN and medications    There is no refill protocol information for this order           adderall  Last Written Prescription Date:  10/30/2018  Last Fill Quantity: 30,  # refills: 0   Last office visit: 10/30/2018 with prescribing provider:      Future Office Visit:   Next 5 appointments (look out 90 days)     2019  1:00 PM CST   Return Visit with Patti Milligan MD   [facility] ([facility])    [address] [facility] [address]   [phone]                          amphetamine-dextroamphetamine (ADDERALL) 20 MG tablet 30 tablet 0     Sig: Take 1 tablet (20 mg) by mouth daily    There is no refill protocol information for this order          Routing refill request to provider for review/approval because:  Drug not on the [facility] refill protocol           [place] RN on 2018 at 4:51 PM

## 2018-11-26 RX ORDER — DEXTROAMPHETAMINE SACCHARATE, AMPHETAMINE ASPARTATE, DEXTROAMPHETAMINE SULFATE AND AMPHETAMINE SULFATE 5; 5; 5; 5 MG/1; MG/1; MG/1; MG/1
20 TABLET ORAL DAILY
Qty: 30 TABLET | Refills: 0 | Status: SHIPPED | OUTPATIENT
Start: 2018-11-26 | End: 2018-12-14

## 2018-11-26 RX ORDER — LORAZEPAM 1 MG/1
TABLET ORAL
Qty: 50 TABLET | Refills: 0 | Status: SHIPPED | OUTPATIENT
Start: 2018-11-26 | End: 2018-12-14

## 2018-11-27 ENCOUNTER — HOME INFUSION (PRE-WILLOW HOME INFUSION) (OUTPATIENT)
Dept: PHARMACY | Facility: CLINIC | Age: 48
End: 2018-11-27

## 2018-11-28 ENCOUNTER — HOME INFUSION (PRE-WILLOW HOME INFUSION) (OUTPATIENT)
Dept: PHARMACY | Facility: CLINIC | Age: 48
End: 2018-11-28

## 2018-11-28 NOTE — PROGRESS NOTES
This is a recent snapshot of the patient's [facility] medical record.  For current drug dose and complete information and questions, call [phone] or In Basket pool, fv home infusion ([id number])  CSN Number:  [id number]

## 2018-11-29 NOTE — PROGRESS NOTES
This is a recent snapshot of the patient's [facility] medical record.  For current drug dose and complete information and questions, call [phone] or In Basket pool, fv home infusion ([id number])  CSN Number:  [id number]

## 2018-12-03 ENCOUNTER — MYC MEDICAL ADVICE (OUTPATIENT)
Dept: INTERNAL MEDICINE | Facility: CLINIC | Age: 48
End: 2018-12-03

## 2018-12-03 ENCOUNTER — HOSPITAL ENCOUNTER (OUTPATIENT)
Facility: CLINIC | Age: 48
Setting detail: SPECIMEN
Discharge: HOME | End: 2018-12-03
Admitting: SURGERY
Payer: COMMERCIAL

## 2018-12-03 LAB
ALBUMIN SERPL-MCNC: 3.5 G/DL (ref 3.4–5)
ALP SERPL-CCNC: 76 U/L (ref 40–150)
ALT SERPL W P-5'-P-CCNC: 21 U/L (ref 0–70)
ANION GAP SERPL CALCULATED.3IONS-SCNC: 5 MMOL/L (ref 3–14)
AST SERPL W P-5'-P-CCNC: 20 U/L (ref 0–45)
BASOPHILS # BLD AUTO: 0 10E9/L (ref 0–0.2)
BASOPHILS NFR BLD AUTO: 0.3 %
BILIRUB DIRECT SERPL-MCNC: <0.1 MG/DL (ref 0–0.2)
BILIRUB SERPL-MCNC: 0.4 MG/DL (ref 0.2–1.3)
BUN SERPL-MCNC: 14 MG/DL (ref 7–30)
CALCIUM SERPL-MCNC: 8.2 MG/DL (ref 8.5–10.1)
CHLORIDE SERPL-SCNC: 107 MMOL/L (ref 94–109)
CO2 SERPL-SCNC: 31 MMOL/L (ref 20–32)
CREAT SERPL-MCNC: 0.9 MG/DL (ref 0.66–1.25)
DIFFERENTIAL METHOD BLD: ABNORMAL
EOSINOPHIL # BLD AUTO: 0.3 10E9/L (ref 0–0.7)
EOSINOPHIL NFR BLD AUTO: 3.3 %
ERYTHROCYTE [DISTWIDTH] IN BLOOD BY AUTOMATED COUNT: 14.1 % (ref 10–15)
GFR SERPL CREATININE-BSD FRML MDRD: >90 ML/MIN/1.7M2
GLUCOSE SERPL-MCNC: 61 MG/DL (ref 70–99)
HCT VFR BLD AUTO: 39.3 % (ref 40–53)
HGB BLD-MCNC: 12.4 G/DL (ref 13.3–17.7)
IMM GRANULOCYTES # BLD: 0 10E9/L (ref 0–0.4)
IMM GRANULOCYTES NFR BLD: 0.2 %
LYMPHOCYTES # BLD AUTO: 3.1 10E9/L (ref 0.8–5.3)
LYMPHOCYTES NFR BLD AUTO: 32 %
MAGNESIUM SERPL-MCNC: 2.2 MG/DL (ref 1.6–2.3)
MCH RBC QN AUTO: 31.4 PG (ref 26.5–33)
MCHC RBC AUTO-ENTMCNC: 31.6 G/DL (ref 31.5–36.5)
MCV RBC AUTO: 100 FL (ref 78–100)
MONOCYTES # BLD AUTO: 1.1 10E9/L (ref 0–1.3)
MONOCYTES NFR BLD AUTO: 11.3 %
NEUTROPHILS # BLD AUTO: 5.1 10E9/L (ref 1.6–8.3)
NEUTROPHILS NFR BLD AUTO: 52.9 %
NRBC # BLD AUTO: 0 10*3/UL
NRBC BLD AUTO-RTO: 0 /100
PHOSPHATE SERPL-MCNC: 3.5 MG/DL (ref 2.5–4.5)
PLATELET # BLD AUTO: 154 10E9/L (ref 150–450)
POTASSIUM SERPL-SCNC: 4 MMOL/L (ref 3.4–5.3)
PROT SERPL-MCNC: 6.8 G/DL (ref 6.8–8.8)
RBC # BLD AUTO: 3.95 10E12/L (ref 4.4–5.9)
SODIUM SERPL-SCNC: 143 MMOL/L (ref 133–144)
TRIGL SERPL-MCNC: 85 MG/DL
WBC # BLD AUTO: 9.6 10E9/L (ref 4–11)

## 2018-12-03 PROCEDURE — 80053 COMPREHEN METABOLIC PANEL: CPT | Performed by: SURGERY

## 2018-12-03 PROCEDURE — 83735 ASSAY OF MAGNESIUM: CPT | Performed by: SURGERY

## 2018-12-03 PROCEDURE — 84478 ASSAY OF TRIGLYCERIDES: CPT | Performed by: SURGERY

## 2018-12-03 PROCEDURE — 85025 COMPLETE CBC W/AUTO DIFF WBC: CPT | Performed by: SURGERY

## 2018-12-03 PROCEDURE — 84100 ASSAY OF PHOSPHORUS: CPT | Performed by: SURGERY

## 2018-12-03 PROCEDURE — 82248 BILIRUBIN DIRECT: CPT | Performed by: SURGERY

## 2018-12-04 ENCOUNTER — DOCUMENTATION ONLY (OUTPATIENT)
Dept: CARE COORDINATION | Facility: CLINIC | Age: 48
End: 2018-12-04

## 2018-12-04 ENCOUNTER — HOME INFUSION (PRE-WILLOW HOME INFUSION) (OUTPATIENT)
Dept: PHARMACY | Facility: CLINIC | Age: 48
End: 2018-12-04

## 2018-12-04 NOTE — PROGRESS NOTES
[facility] now requests orders and shares plan of care/discharge summaries for some patients through [email or web address].  Please REPLY TO THIS MESSAGE OR ROUTE BACK TO THE AUTHOR in order to give authorization for orders when needed.  This is considered a verbal order, you will still receive a faxed copy of orders for signature.  Thank you for your assistance in improving collaboration for our patients.    MD SUMMARY/PLAN OF CARE  Slight redness around PICC site, no drainage noted, no inflammation noted. Patient reports feeling more tired lately. Wife reporting that temps continue to range from 98 to 99, However did not want vitals taken during visit yesterday. Wife reported that last week patient had rash above PICC line dressing where extra tegaderm was, these areas remain raised and dry now, no longer red in color, patient denies itchness at this time. Does have rash around Peg site as well reporting using antifungal and reports that this has been helping. Patient also had sluggish purple line, was able to draw back some blood however not even enough to waste, flushed line again with 10ML NS, primed cap was changed and flushed with another 10mL, primed another blue cap, removed valve from white line, alcohol wiped area and blue primed cap applied, flushed with 10ml and wasted 9ml blood, labs drawn per MD order, cap alcoholed and flushed with 10mL, cap removed and cleaned with alcohol, new primed valve and cap placed and flushed with another 10mL NS.

## 2018-12-04 NOTE — PROGRESS NOTES
[facility] now requests orders and shares plan of care/discharge summaries for some patients through [employer].  Please REPLY TO THIS MESSAGE OR ROUTE BACK TO THE AUTHOR in order to give authorization for orders when needed.  This is considered a verbal order, you will still receive a faxed copy of orders for signature.  Thank you for your assistance in improving collaboration for our patients.    MD SUMMARY/PLAN OF CARE    Slight redness around PICC site, no drainage noted, no inflammation noted. Patient reports feeling more tired lately. Wife reporting that temps continue to range from 98 to 99, However did not want vitals taken during visit yesterday. Wife reported that last week patient had rash above PICC line dressing where extra tegaderm was, these areas remain raised and dry now, no longer red in color, patient denies itchness at this time. Does have rash around Peg site as well reporting using antifungal and reports that this has been helping. Patient also had sluggish purple line, was able to draw back some blood however not even enough to waste, flushed line again with 10ML NS, primed cap was changed and flushed with another 10mL, primed another blue cap, removed valve from white line, alcohol wiped area and blue primed cap applied, flushed with 10ml and wasted 9ml blood, labs drawn per MD order, cap alcoholed and flushed with 10mL, cap removed and cleaned with alcohol, new primed valve and cap placed and flushed with another 10mL NS.

## 2018-12-05 ENCOUNTER — DOCUMENTATION ONLY (OUTPATIENT)
Dept: CARE COORDINATION | Facility: CLINIC | Age: 48
End: 2018-12-05

## 2018-12-05 NOTE — PROGRESS NOTES
This is a recent snapshot of the patient's [facility] medical record.  For current drug dose and complete information and questions, call [phone] or In Basket pool, fv home infusion ([id number])  CSN Number:  [id number]

## 2018-12-05 NOTE — PROGRESS NOTES
This is a recent snapshot of the patient's [facility] medical record.  For current drug dose and complete information and questions, call [phone] or In Basket pool, fv home infusion ([id number])  CSN Number:  [id number]

## 2018-12-05 NOTE — PROGRESS NOTES
[facility] utilizes an encounter to take the place of a direct phone call to your office. Please take a moment to review the below request. Please reply or route message to author of this encounter.  Message will act as a verbal OK of orders requested below. Thank you.    ORDER    SN 1d1 for home care recertification     Astrid Almonte RN,   [facility]  [phone]  [email or web address]

## 2018-12-06 ENCOUNTER — PATIENT OUTREACH (OUTPATIENT)
Dept: CARE COORDINATION | Facility: CLINIC | Age: 48
End: 2018-12-06

## 2018-12-06 ASSESSMENT — ACTIVITIES OF DAILY LIVING (ADL): DEPENDENT_IADLS:: MEDICATION MANAGEMENT;TRANSPORTATION;SHOPPING

## 2018-12-06 NOTE — PROGRESS NOTES
Clinic Care Coordination Contact    Clinic Care Coordination Contact  OUTREACH    Referral Information:  Referral Source: IP Report    Primary Diagnosis: SIRS/Sepsis    Chief Complaint   Patient presents with     Clinic Care Coordination - Follow-up     RN        Universal Utilization: Patient is followed Infectious Disease, Pain Management and Bariatric Surgery  Clinic Utilization  Difficulty keeping appointments:: No  Compliance Concerns: Yes  No-Show Concerns: No  No PCP office visit in Past Year: No  Utilization    Last refreshed: 12/5/2018 11:03 AM:  No Show Count (past year) 2       Last refreshed: 12/5/2018 11:03 AM:  ED visits 4       Last refreshed: 12/5/2018 11:03 AM:  Hospital admissions 3          Current as of: 12/5/2018 11:03 AM             Clinical Concerns:  Current Medical Concerns:  RN CC spoke with patient's spouse (consent to communicate on file) who reports patient has been improving.  Patient established care with Dr. Isaac at [facility].  Patient's pain medication was changed from fentanyl to oxycodone.  Rose reports patient's low back and chronic abdominal pain have improved greatly while on the oxycodone.  Patient continues to exercise 5 days/week in small increments to reach 10 minutes.  Rose states if he is up too long his feet begin to swell.  Patient is followed by [facility] for line management.  Temperature has remained 98-99 degrees.  Rose states she will be scheduling appointments with Infectious Disease and Bariatric Surgery after the holidays to discuss future line placement and is uncertain whether it will be a port or Cm.  No other concerns or questions at this time.  Patient Active Problem List   Diagnosis     Peptic ulcer disease     Gastric bypass status for obesity     Chronic abdominal pain     Vomiting     Anemia     ADHD (attention deficit hyperactivity disorder), inattentive type     Vitamin B12 deficiency without anemia     Thiamine  deficiency     Dehydration     Dysphagia     Weight loss, non-intentional     Malnutrition (H)     Chronic anxiety     Constipation     Bile reflux esophagitis     Former smoker     Vitamin D deficiency     Iron deficiency     Health Care Home     Chronic pain     Insomnia     Positive blood culture     Fungemia     Chronic nausea     Gastrostomy tube in place (H)     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)     Short gut syndrome     Anxiety     Status post cervical spinal arthrodesis     Port or reservoir infection, initial encounter     Abnormal echocardiogram     Low serum iron     Anemia, iron deficiency     Catheter-related bloodstream infection (CRBSI)     Generalized weakness     S/P bariatric surgery     Hyperlipidemia LDL goal <130     Bacteremia         Current Behavioral Concerns: Patient's ADHD and anxiety are managed by PCP.      Education Provided to patient: n/a     Pain  Pain (GOAL):: Yes  Type: Chronic (>3mo)  Location of chronic pain:: chronic abdominal pain and lower back pain  Radiating: Yes  Progression: Improving  Limitation of routine activities due to chronic pain:: Yes  Description: Unable to perform most daily activities (chores, hobbies, social activities, driving)  Alleviating Factors: Pain Medication, Rest, Heat  Health Maintenance Reviewed: Due/Overdue   Health Maintenance Due   Topic Date Due     LIPID MONITORING Q1 YEAR  05/04/2017      Clinical Pathway: None    Medication Management:  Patient's spouse Rose manage's patient's medications and IV therapies.     Functional Status:  Dependent ADLs:: Ambulation-cane, Bathing  Dependent IADLs:: Medication Management, Transportation, Shopping  Bed or wheelchair confined:: No  Mobility Status: Independent    Living Situation:  Current living arrangement:: I live in a private home with spouse  Type of residence:: Private home - stairs    Diet/Exercise/Sleep:  Diet:: Regular (But also has TPN)  Inadequate nutrition (GOAL):: No  Food  Insecurity: No  Tube Feeding: No  Exercise:: Yes  Days per week of moderate to strenuous exercise (like a brisk walk): 3  On average, minutes per day of exercise at this level: 10  How intense was your typical exercise? : Light (like stretching or slow walking)  Exercise Minutes per Week: 30  Inadequate activity/exercise (GOAL):: No  Significant changes in sleep pattern (GOAL): No    Transportation:  Transportation concerns (GOAL):: No  Transportation means:: Regular car     Psychosocial:  Jew or spiritual beliefs that impact treatment:: No  Mental health DX:: Yes  Mental health DX how managed:: Medication  Mental health management concern (GOAL):: No  Informal Support system:: Family, Spouse     Financial/Insurance:   Financial/Insurance concerns (GOAL):: No  [facility] 1/1/19     Resources and Interventions:  Current Resources:   List of home care services:: Skilled Nursing;   Community Resources: Home Infusion, Home Care  Supplies used at home:: None  Equipment Currently Used at Home: cane, straight, shower chair    Advance Care Plan/Directive  Advanced Care Plans/Directives on file:: Yes  Type Advanced Care Plans/Directives: Advanced Directive - On File  Advanced Care Plan/Directive Status: Not Applicable    Referrals Placed: None     Goals: previously met        Patient/Caregiver understanding: Spouse Rose has a good understanding of patient's current plan of care.  Rose requests ongoing care coordination due to patient's multiple complex medical diagnoses and frequent hospitalizations.    Outreach Frequency: monthly  Future Appointments              In 2 months Patti Milligan MD [facility] [place], [facility]          Plan:   1. Rose will schedule appointments for patient will ID and Bariatric Surgery.  2. RN CC will follow up with patient and spouse in 1 month.    Melissa Behl BSN, RN, PHN  [facility]  Coordinator  [phone]

## 2018-12-07 ENCOUNTER — TELEPHONE (OUTPATIENT)
Dept: INTERNAL MEDICINE | Facility: CLINIC | Age: 48
End: 2018-12-07

## 2018-12-07 NOTE — TELEPHONE ENCOUNTER
Reason for Call: Request for an order or referral:    Order or referral being requested: Re certification for Home Health Nurse.  Increase to twice a week visits and 3 PRN's.  Need ok to continue I plan of care also for his infusions.  Also need to continue labs every other week    Date needed: soon    Has the patient been seen by the PCP for this problem? Not Applicable    Additional comments:     Phone number Patient can be reached at:  [phone]    Best Time:  any    Can we leave a detailed message on this number?  YES    Call taken on 12/7/2018 at 3:27 PM by Tanya Delacruz

## 2018-12-09 ENCOUNTER — MYC REFILL (OUTPATIENT)
Dept: FAMILY MEDICINE | Facility: CLINIC | Age: 48
End: 2018-12-09

## 2018-12-09 ENCOUNTER — MYC REFILL (OUTPATIENT)
Dept: INTERNAL MEDICINE | Facility: CLINIC | Age: 48
End: 2018-12-09

## 2018-12-09 DIAGNOSIS — R11.0 NAUSEA: ICD-10-CM

## 2018-12-09 DIAGNOSIS — F90.0 ADHD (ATTENTION DEFICIT HYPERACTIVITY DISORDER), INATTENTIVE TYPE: ICD-10-CM

## 2018-12-09 DIAGNOSIS — E53.8 VITAMIN B12 DEFICIENCY (NON ANEMIC): ICD-10-CM

## 2018-12-09 DIAGNOSIS — F41.9 ANXIETY: ICD-10-CM

## 2018-12-09 RX ORDER — DEXTROAMPHETAMINE SACCHARATE, AMPHETAMINE ASPARTATE, DEXTROAMPHETAMINE SULFATE AND AMPHETAMINE SULFATE 5; 5; 5; 5 MG/1; MG/1; MG/1; MG/1
20 TABLET ORAL DAILY
Qty: 30 TABLET | Refills: 0 | Status: CANCELLED | OUTPATIENT
Start: 2018-12-09

## 2018-12-09 RX ORDER — LORAZEPAM 1 MG/1
TABLET ORAL
Qty: 50 TABLET | Refills: 0 | Status: CANCELLED | OUTPATIENT
Start: 2018-12-09

## 2018-12-11 RX ORDER — ONDANSETRON 8 MG/1
8 TABLET, ORALLY DISINTEGRATING ORAL EVERY 8 HOURS PRN
Qty: 90 TABLET | Refills: 1 | OUTPATIENT
Start: 2018-12-11

## 2018-12-11 RX ORDER — CYANOCOBALAMIN 1000 UG/ML
1 INJECTION, SOLUTION INTRAMUSCULAR; SUBCUTANEOUS
Qty: 1 ML | Refills: 11 | OUTPATIENT
Start: 2018-12-11

## 2018-12-11 NOTE — TELEPHONE ENCOUNTER
"Requested Prescriptions   Pending Prescriptions Disp Refills     ondansetron (ZOFRAN-ODT) 8 MG ODT tab 90 tablet 1     Sig: Take 1 tablet (8 mg) by mouth every 8 hours as needed for nausea     Antivertigo/Antiemetic Agents Passed - 12/9/2018  3:12 PM       Passed - Recent (12 mo) or future (30 days) visit within the authorizing provider's specialty    Patient had office visit in the last 12 months or has a visit in the next 30 days with authorizing provider or within the authorizing provider's specialty.  See \"Patient Info\" tab in inbasket, or \"Choose Columns\" in Meds & Orders section of the refill encounter.             Passed - Patient is 18 years of age or older        vitamin B-12 (CYANOCOBALAMIN) 1000 MCG/ML injection 1 mL 11     Sig: Inject 1 mL (1,000 mcg) into the muscle every 30 days    Vitamin Supplements (Adult) Protocol Passed - 12/9/2018  3:12 PM       Passed - High dose Vitamin D not ordered       Passed - Recent (12 mo) or future (30 days) visit within the authorizing provider's specialty    Patient had office visit in the last 12 months or has a visit in the next 30 days with authorizing provider or within the authorizing provider's specialty.  See \"Patient Info\" tab in inbasket, or \"Choose Columns\" in Meds & Orders section of the refill encounter.            Last OV 10/30/2018 PL  Last filled 11/08/2018 and 11/23/2018 shouldn't need a refill    Miguel Thompson, RN, BSN            "

## 2018-12-11 NOTE — TELEPHONE ENCOUNTER
Adderall 20 MG       Last Written Prescription Date:  11/26/18  Last Fill Quantity: 30,   # refills: 0  Last Office Visit: 10/30/18  Future Office visit:    Next 5 appointments (look out 90 days)    Feb 06, 2019  1:00 PM CST  Return Visit with Patti Milligan MD  [facility] ([facility]) [address] [facility] [address] [facility]  [place] [address]  [phone]           Routing refill request to provider for review/approval because:  Drug not on the [facility], [facility] or [facility] refill protocol or controlled substance  Lorazepam 1 MG       Last Written Prescription Date:  11/26/18  Last Fill Quantity: 50,   # refills: 0  Last Office Visit: 10/30/18  Future Office visit:    Next 5 appointments (look out 90 days)    Feb 06, 2019  1:00 PM CST  Return Visit with Patti Milligan MD  [facility] ([facility]) [address]  [place] [address]  [phone]           Routing refill request to provider for review/approval because:  Drug not on the [facility], [facility] or [facility] refill protocol or controlled substance

## 2018-12-13 ENCOUNTER — MYC MEDICAL ADVICE (OUTPATIENT)
Dept: PALLIATIVE MEDICINE | Facility: CLINIC | Age: 48
End: 2018-12-13

## 2018-12-13 ENCOUNTER — MYC MEDICAL ADVICE (OUTPATIENT)
Dept: INTERNAL MEDICINE | Facility: CLINIC | Age: 48
End: 2018-12-13

## 2018-12-13 DIAGNOSIS — G89.29 CHRONIC ABDOMINAL PAIN: ICD-10-CM

## 2018-12-13 DIAGNOSIS — R10.9 CHRONIC ABDOMINAL PAIN: ICD-10-CM

## 2018-12-13 DIAGNOSIS — Z79.891 ENCOUNTER FOR LONG-TERM OPIATE ANALGESIC USE: ICD-10-CM

## 2018-12-13 NOTE — TELEPHONE ENCOUNTER
Pt's wife calling to check status of this. C2C on file. She states the Adderall was filled on 11/15. She is wanting this done by tomorrow. Please send to [facility].  Thank you,  Jyothi Perez- Pt Rep.

## 2018-12-14 RX ORDER — LORAZEPAM 1 MG/1
TABLET ORAL
Qty: 50 TABLET | Refills: 0 | Status: SHIPPED | OUTPATIENT
Start: 2018-12-14 | End: 2019-01-10

## 2018-12-14 RX ORDER — OXYCODONE HCL 5 MG/5 ML
10-15 SOLUTION, ORAL ORAL EVERY 4 HOURS PRN
Qty: 1800 ML | Refills: 0 | Status: SHIPPED | OUTPATIENT
Start: 2018-12-14 | End: 2019-01-15

## 2018-12-14 RX ORDER — DEXTROAMPHETAMINE SACCHARATE, AMPHETAMINE ASPARTATE, DEXTROAMPHETAMINE SULFATE AND AMPHETAMINE SULFATE 5; 5; 5; 5 MG/1; MG/1; MG/1; MG/1
20 TABLET ORAL DAILY
Qty: 30 TABLET | Refills: 0 | Status: SHIPPED | OUTPATIENT
Start: 2018-12-14 | End: 2019-02-06

## 2018-12-14 NOTE — TELEPHONE ENCOUNTER
Signed Prescriptions:                        Disp   Refills    oxyCODONE (ROXICODONE) 5 MG/5ML solution   1800 mL0        Sig: Take 10-15 mLs (10-15 mg) by mouth every 4 hours as           needed for moderate to severe pain Max of 60           mg/day. Fill on/after 12/27/18 not to start           untill 12/29/18.  Authorizing Provider: BRANDYN JENKINS MD  [facility]

## 2018-12-14 NOTE — TELEPHONE ENCOUNTER
Mailed Rx to the pt's preferred pharmacy.     [place], [place] - [address] [place] [address]  Phone: [phone] Fax: [phone]  Pt's phone number is inactive    Corky Chatman MA  [facility] Center

## 2018-12-14 NOTE — TELEPHONE ENCOUNTER
Received call from patient requesting refill(s) of oxyCODONE (ROXICODONE) 5 MG/5ML solution    Last picked up from pharmacy on 11/28/18    Pt last seen by prescribing provider on 10/29/18  Next appt scheduled for 2/6/19     checked in the past 6 months? Yes If no, print current report and give to RN    Last urine drug screen date 10/29/18  Current opioid agreement on file (completed within the last year) Yes Date of opioid agreement: 10/29/18    Processing (pick one and delete the others):  Deliver to [facility] pharmacy    Corky Chatman MA  [facility] Management Center    Will route to nursing pool for review and preparation of prescription(s).

## 2018-12-14 NOTE — TELEPHONE ENCOUNTER
Spoke with [facility] pharmacy who reports that pt picked up last RX of Adderall on 11/15/18 and then Lorazepam 11/28/18 for a 25 days supply.

## 2018-12-14 NOTE — TELEPHONE ENCOUNTER
Signed original RX delivered to [facility] to be delivered to [facility] pharmacy . Eva,

## 2018-12-14 NOTE — TELEPHONE ENCOUNTER
Medication refill information reviewed. Please review sig. Looks like dose was adjusted last month    Due date for oxyCODONE (ROXICODONE) 5 MG/5ML solution is 12/29/18     Prescriptions prepped for review.     Will route to provider.

## 2018-12-18 ENCOUNTER — MEDICAL CORRESPONDENCE (OUTPATIENT)
Dept: PHARMACY | Facility: CLINIC | Age: 48
End: 2018-12-18

## 2018-12-18 ENCOUNTER — HOSPITAL ENCOUNTER (OUTPATIENT)
Dept: LAB | Facility: CLINIC | Age: 48
Discharge: HOME | End: 2018-12-18
Attending: SURGERY | Admitting: SURGERY
Payer: COMMERCIAL

## 2018-12-18 LAB
ALBUMIN SERPL-MCNC: 3.7 G/DL (ref 3.4–5)
ALP SERPL-CCNC: 81 U/L (ref 40–150)
ALT SERPL W P-5'-P-CCNC: 27 U/L (ref 0–70)
ANION GAP SERPL CALCULATED.3IONS-SCNC: 8 MMOL/L (ref 3–14)
AST SERPL W P-5'-P-CCNC: 18 U/L (ref 0–45)
BASOPHILS # BLD AUTO: 0.1 10E9/L (ref 0–0.2)
BASOPHILS NFR BLD AUTO: 0.6 %
BILIRUB SERPL-MCNC: 0.8 MG/DL (ref 0.2–1.3)
BUN SERPL-MCNC: 12 MG/DL (ref 7–30)
CALCIUM SERPL-MCNC: 8.2 MG/DL (ref 8.5–10.1)
CHLORIDE SERPL-SCNC: 108 MMOL/L (ref 94–109)
CO2 SERPL-SCNC: 26 MMOL/L (ref 20–32)
CREAT SERPL-MCNC: 0.6 MG/DL (ref 0.66–1.25)
DIFFERENTIAL METHOD BLD: ABNORMAL
EOSINOPHIL NFR BLD AUTO: 1.8 %
ERYTHROCYTE [DISTWIDTH] IN BLOOD BY AUTOMATED COUNT: 13.9 % (ref 10–15)
GFR SERPL CREATININE-BSD FRML MDRD: >90 ML/MIN/1.7M2
GLUCOSE SERPL-MCNC: 79 MG/DL (ref 70–99)
HCT VFR BLD AUTO: 41.4 % (ref 40–53)
HGB BLD-MCNC: 13 G/DL (ref 13.3–17.7)
IMM GRANULOCYTES # BLD: 0 10E9/L (ref 0–0.4)
IMM GRANULOCYTES NFR BLD: 0.1 %
LYMPHOCYTES # BLD AUTO: 2.5 10E9/L (ref 0.8–5.3)
LYMPHOCYTES NFR BLD AUTO: 31.8 %
MCH RBC QN AUTO: 31 PG (ref 26.5–33)
MCHC RBC AUTO-ENTMCNC: 31.4 G/DL (ref 31.5–36.5)
MCV RBC AUTO: 99 FL (ref 78–100)
MONOCYTES # BLD AUTO: 0.9 10E9/L (ref 0–1.3)
MONOCYTES NFR BLD AUTO: 11.5 %
NEUTROPHILS # BLD AUTO: 4.3 10E9/L (ref 1.6–8.3)
NEUTROPHILS NFR BLD AUTO: 54.2 %
NRBC # BLD AUTO: 0 10*3/UL
NRBC BLD AUTO-RTO: 0 /100
PLATELET # BLD AUTO: 150 10E9/L (ref 150–450)
POTASSIUM SERPL-SCNC: 4.4 MMOL/L (ref 3.4–5.3)
PROT SERPL-MCNC: 6.9 G/DL (ref 6.8–8.8)
RBC # BLD AUTO: 4.19 10E12/L (ref 4.4–5.9)
SODIUM SERPL-SCNC: 142 MMOL/L (ref 133–144)
WBC # BLD AUTO: 8 10E9/L (ref 4–11)

## 2018-12-18 PROCEDURE — 80053 COMPREHEN METABOLIC PANEL: CPT | Performed by: SURGERY

## 2018-12-18 PROCEDURE — 85025 COMPLETE CBC W/AUTO DIFF WBC: CPT | Performed by: SURGERY

## 2018-12-19 ENCOUNTER — HOME INFUSION (PRE-WILLOW HOME INFUSION) (OUTPATIENT)
Dept: PHARMACY | Facility: CLINIC | Age: 48
End: 2018-12-19

## 2018-12-20 NOTE — PROGRESS NOTES
This is a recent snapshot of the patient's [facility] medical record.  For current drug dose and complete information and questions, call [phone] or In [facility] pool, fv home infusion ([id number])  CSN Number:  [id number]

## 2018-12-21 ENCOUNTER — MEDICAL CORRESPONDENCE (OUTPATIENT)
Dept: HEALTH INFORMATION MANAGEMENT | Facility: CLINIC | Age: 48
End: 2018-12-21

## 2018-12-23 ENCOUNTER — HOME INFUSION (PRE-WILLOW HOME INFUSION) (OUTPATIENT)
Dept: PHARMACY | Facility: CLINIC | Age: 48
End: 2018-12-23

## 2018-12-23 ENCOUNTER — NURSE TRIAGE (OUTPATIENT)
Dept: NURSING | Facility: CLINIC | Age: 48
End: 2018-12-23

## 2018-12-24 ENCOUNTER — NURSE TRIAGE (OUTPATIENT)
Dept: OTOLARYNGOLOGY | Facility: CLINIC | Age: 48
End: 2018-12-24

## 2018-12-24 ENCOUNTER — HOSPITAL ENCOUNTER (OUTPATIENT)
Dept: LAB | Facility: CLINIC | Age: 48
Discharge: HOME | End: 2018-12-24
Attending: SURGERY | Admitting: SURGERY
Payer: COMMERCIAL

## 2018-12-24 ENCOUNTER — DOCUMENTATION ONLY (OUTPATIENT)
Dept: CARE COORDINATION | Facility: CLINIC | Age: 48
End: 2018-12-24

## 2018-12-24 ENCOUNTER — HOME INFUSION (PRE-WILLOW HOME INFUSION) (OUTPATIENT)
Dept: PHARMACY | Facility: CLINIC | Age: 48
End: 2018-12-24

## 2018-12-24 DIAGNOSIS — Z98.84 BARIATRIC SURGERY STATUS: Primary | ICD-10-CM

## 2018-12-24 DIAGNOSIS — Z95.828 S/P PICC CENTRAL LINE PLACEMENT: ICD-10-CM

## 2018-12-24 DIAGNOSIS — E43 SEVERE PROTEIN-CALORIE MALNUTRITION (H): ICD-10-CM

## 2018-12-24 PROCEDURE — 87040 BLOOD CULTURE FOR BACTERIA: CPT | Performed by: SURGERY

## 2018-12-24 NOTE — TELEPHONE ENCOUNTER
Called and spoke with Tierra. Informed her that Aparna Howard CNP placed orders for blood culture.

## 2018-12-24 NOTE — PROGRESS NOTES
[facility] now requests orders and shares plan of care/discharge summaries for some patients through [employer].  Please REPLY TO THIS MESSAGE OR ROUTE BACK TO THE AUTHOR in order to give authorization for orders when needed.  This is considered a verbal order, you will still receive a faxed copy of orders for signature.  Thank you for your assistance in improving collaboration for our patients.        Pt's wife is stating Pt is running a fever of 99 to 101 over last 72 hours. In addition aches/chills for the last 24 to 48 hours. On call MD suggested ER, but Pt did not go. Wife is wanting me to ask you if your ok with blood culture at today's visit or if you recommend ER as the on call MD suggest.       Thank you!  Anastasiia Dias RN  [email or web address]  [phone]

## 2018-12-24 NOTE — TELEPHONE ENCOUNTER
Patient's wife is calling to request an order for blood culture due her 's long history of blood infections. He has a tendency to run around 99  F and today states he started to have the chills and body aches and went up to 101  F at 8:30 pm. He has a PICC line and is seen by home infusion tomorrow and would like them to be able to draw the blood culture when they come rather than going to the ER, but states she will plan to bring him to ER if he gets over 102  F. She did give him Tylenol. She states that they had to always go to the ER that is over an hour away from home in the past and get his blood drawn and then would get sent back home and was too hard on the patient, so they started doing standing orders. Now in November his primary doctor retired and his care was transitioned to Dr. Isaac at the [facility].     Protocol and care advice reviewed.  Advised that per protocol that patient's with PICC Line with fever over 100.5  F should go to the ER, she requested a page to the on-call for Dr. Isaac at [facility]. Advised if no call back by 9:35 pm to call FNA back, 9:13 pm page the on-call through page .   Caller states understanding of the recommended disposition.   Advised to call back if further questions or concerns.    9:30 PM re-paged the on-call Dr. Oliveros via the page . 9:31 pm called patient's wife Rose back and advised waiting on a return call from provider. 9:32 pm Dr. Oliveros called back and advised that patient needs to go to ED. 9:37 pm called patient's wife back and she understood directive to bring him to the ED.     Reason for Disposition    [1] Fever > 100.5 F (38.1 C) AND [2] has port (portacath), central line, or PICC line    Additional Information    Negative: [1] Difficulty breathing AND [2] bluish lips, tongue or face    Negative: New onset rash with multiple purple (or blood-colored) spots or dots    Negative: Difficult to awaken or acting  confused  (e.g., disoriented, slurred speech)    Negative: Shock suspected (e.g., cold/pale/clammy skin, too weak to stand, low BP, rapid pulse)    Negative: Sounds like a life-threatening emergency to the triager    Negative: [1] Headache AND [2] stiff neck (can't touch chin to chest)    Negative: Difficulty breathing    Negative: IV drug abuse    Negative: [1] Drinking very little AND [2] dehydration suspected (e.g., no urine > 12 hours, very dry mouth, very lightheaded)    Negative: Patient sounds very sick or weak to the triager  (Exception: mild weakness and hasn't taken fever medicine)    Negative: Fever > 104 F (40 C)    Negative: [1] Fever > 101 F (38.3 C) AND [2] age > 60    Negative: [1] Fever > 101 F (38.3 C) AND [2] bedridden (e.g., nursing home patient, CVA, chronic illness, recovering from surgery)    Negative: [1] Fever > 100.5 F (38.1 C) AND [2] indwelling urinary catheter (e.g., Sanchez, Coude)    Protocols used: FEVER-ADULT-AH

## 2018-12-24 NOTE — TELEPHONE ENCOUNTER
[facility]: Nurse Triage Note  SITUATION/BACKGROUND                                                      Parker Acevedo Sr. 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  He is TPN dependent.  This is managed by the bariatric surgeon Dr. Teague at the [facility] ([facility] calls for orders for blood culture.  Patient spoke to on call re: fever  over past 72 hours. Directed to ED but they wished to defer. Wife and PHN requestin. order for blood culture now  2. Also would like standing orders for blood culture with parameter for fever, so these can be collected at time of symptoms and blood culture media can be kept at patient home.     Please place order and call Tierra with update.         Allergies:   Allergies   Allergen Reactions     Bactrim [Sulfamethoxazole W/Trimethoprim] Rash     Penicillins Anaphylaxis     Please see Antimicrobial Management Team allergy assessment note 10/10/2018. Patient reported tolerating amoxicillin.     Doxycycline Rash     Vancomycin Rash     Rash after receiving vancomycin 3/28/16 (red man's?). Tolerated with slower infusion and diphenhydramine premed.       ASSESSMENT      As above- needs orders for home care. High priority message to Dr teague team    RECOMMENDATION/PLAN                                                      RECOMMENDED DISPOSITION:  Prder placement needed.Dr Teague.  Will comply with recommendation: Yes    Ivy Briceño RN

## 2018-12-25 ENCOUNTER — MYC MEDICAL ADVICE (OUTPATIENT)
Dept: PALLIATIVE MEDICINE | Facility: CLINIC | Age: 48
End: 2018-12-25

## 2018-12-25 DIAGNOSIS — R10.9 CHRONIC ABDOMINAL PAIN: ICD-10-CM

## 2018-12-25 DIAGNOSIS — Z79.891 ENCOUNTER FOR LONG-TERM OPIATE ANALGESIC USE: ICD-10-CM

## 2018-12-25 DIAGNOSIS — G89.29 CHRONIC ABDOMINAL PAIN: ICD-10-CM

## 2018-12-26 ENCOUNTER — HOME INFUSION (PRE-WILLOW HOME INFUSION) (OUTPATIENT)
Dept: PHARMACY | Facility: CLINIC | Age: 48
End: 2018-12-26

## 2018-12-26 ENCOUNTER — MYC MEDICAL ADVICE (OUTPATIENT)
Dept: PALLIATIVE MEDICINE | Facility: CLINIC | Age: 48
End: 2018-12-26

## 2018-12-26 RX ORDER — FENTANYL 25 UG/1
1 PATCH TRANSDERMAL
Qty: 15 PATCH | Refills: 0 | Status: SHIPPED | OUTPATIENT
Start: 2018-12-26 | End: 2018-12-27 | Stop reason: ALTCHOICE

## 2018-12-26 NOTE — TELEPHONE ENCOUNTER
Writer called Pt.  Pt's wife stated that 12mcg patch did not work for Pt and they are asking for the 25mcg patch.    Skinny Kim, RN  Care Coordinator   [facility] [place]

## 2018-12-26 NOTE — TELEPHONE ENCOUNTER
Per patient Tomasa message:  From: Parker Acevedo Sr.      Created: 12/25/2018 6:33 AM          I would like to restart the fentynal patch again for it did help alot the oxycodone is just not enough to help the pain if you have any questions feel free to call us at [phone] Thanks again and Catherine Waddell        Routed to provider.    Demetrice Yeh, RN-BSN  [facility]

## 2018-12-26 NOTE — TELEPHONE ENCOUNTER
Will ask nursing to please CALL patient, as it can be challenging to have this over [facility].    If we restart the fentanyl patch, would do the 12mcg/hr patch.  Then we would go back to Oxycodone max of 45mg/day- would do oxycodone 10-15mg every 4 hours as needed, max of 45mg/day.    Please make sure that is what they want to do.  (wife helps manage a lot of the medication with her )    Jessica Milligan MD  [facility]

## 2018-12-26 NOTE — PROGRESS NOTES
This is a recent snapshot of the patient's [facility] medical record.  For current drug dose and complete information and questions, call [phone] or In Basket pool, fv home infusion ([id number])  CSN Number:  [id number]

## 2018-12-26 NOTE — PROGRESS NOTES
This is a recent snapshot of the patient's [facility] medical record.  For current drug dose and complete information and questions, call [phone] or In Basket pool, fv home infusion ([id number])  CSN Number:  [id number]

## 2018-12-26 NOTE — TELEPHONE ENCOUNTER
Plan:  I am ok with restarting the fentanyl patch.  Last patch was used in November and was 25mcg/hr.  Therefore we will start that but he will immediately decrease his oxycodone to 30mg/day.  This is down from where he was in November but is part of the weaning that we are doing, and is up from the dose that he is currently doing. Please make sure they aren't adjusting oxycodone dose without coming to us, as this has been done in the past.  (sometimes changes are made with updates letting us know they have done it- which isn't safe and has been discussed)    He received a new bottle of oxycodone to start 12/29, and this now should last 60 days at the new dose of 30mg/day.    Jessica Jenkins MD  [facility]     Script placed in TPI cart at [facility] to call and notify patient    Signed Prescriptions:                        Disp   Refills    fentaNYL (DURAGESIC) 25 mcg/hr 72 hr patch 15 pat*0        Sig: Place 1 patch onto the skin every 48 hours . remove           old patch.  Decrease oxycodone dose as directed           when starting this patch  Authorizing Provider: BRANDYN JENKINS

## 2018-12-27 ENCOUNTER — HOME INFUSION (PRE-WILLOW HOME INFUSION) (OUTPATIENT)
Dept: PHARMACY | Facility: CLINIC | Age: 48
End: 2018-12-27

## 2018-12-27 ENCOUNTER — TELEPHONE (OUTPATIENT)
Dept: INTERNAL MEDICINE | Facility: CLINIC | Age: 48
End: 2018-12-27

## 2018-12-27 NOTE — PROGRESS NOTES
This is a recent snapshot of the patient's [facility] medical record.  For current drug dose and complete information and questions, call [phone] or In Basket pool, fv home infusion ([id number])  CSN Number:  [id number]

## 2018-12-27 NOTE — TELEPHONE ENCOUNTER
Writer called pt.    Writer reviewed Plan below with Pt.      Pt stated that he didn't ask for a change in his medication, Pt's wife did.  Pt stated that he doesn't want to go down on oxycodone and doesn't want to start Fentanyl.    Pt stated that he will talk to Dr. Milligan at his next office Appt.    Writer discussed with pt that he needs to be involved in discussions around Plan of Care, Pt's wife shouldn't be asking for medication changes without Pt knowing.    Fentanyl patch destroyed.       Skinny Kim, RN  Care Coordinator   [facility] [place]

## 2018-12-27 NOTE — TELEPHONE ENCOUNTER
Reason for Call:  Other    Detailed comments: home care is on the phone and his picc line has blood in it and is clotted off.     Phone Number Patient can be reached at: Other phone number:  [phone]    Best Time: any    Can we leave a detailed message on this number? YES    Call taken on 12/27/2018 at 3:32 PM by Elaine Hoffman

## 2018-12-27 NOTE — TELEPHONE ENCOUNTER
Call from Daniela HARRISON, she reports that patient called her earlier today, dressing fell off PICC line and valve has also fallen off.  She did make a visit to patient, she reports that line is clotted.      Danilea is wondering if clinic can set up new PICC line or if patient should be seen in the ED.  Clinic is unable to arrange new PICC line for patient and he should be seen in the ED.     Irene Young RN

## 2018-12-27 NOTE — TELEPHONE ENCOUNTER
See the 12/25/18 mychart encounter for more information.    Demetrice Yeh RN-BSN  [facility]

## 2018-12-28 NOTE — TELEPHONE ENCOUNTER
Yamila calls this morning with an update on Parker.  Parker did not go to ED yesterday as suggested and has not gone in this morning either.  Yamila states pts wife stated she was able to get the blood out and flush the line.  Wife did this after being told she should not do it and should take Parker to ED.

## 2018-12-28 NOTE — PROGRESS NOTES
This is a recent snapshot of the patient's [facility] medical record.  For current drug dose and complete information and questions, call [phone] or In Basket pool, fv home infusion ([id number])  CSN Number:  [id number]

## 2019-01-02 ENCOUNTER — TELEPHONE (OUTPATIENT)
Dept: INTERNAL MEDICINE | Facility: CLINIC | Age: 49
End: 2019-01-02

## 2019-01-02 ENCOUNTER — HOME INFUSION (PRE-WILLOW HOME INFUSION) (OUTPATIENT)
Dept: PHARMACY | Facility: CLINIC | Age: 49
End: 2019-01-02

## 2019-01-02 ENCOUNTER — TELEPHONE (OUTPATIENT)
Dept: SURGERY | Facility: CLINIC | Age: 49
End: 2019-01-02

## 2019-01-02 LAB
ALBUMIN SERPL-MCNC: 3.5 G/DL (ref 3.4–5)
ALP SERPL-CCNC: 73 U/L (ref 40–150)
ALT SERPL W P-5'-P-CCNC: 22 U/L (ref 0–70)
ANION GAP SERPL CALCULATED.3IONS-SCNC: 5 MMOL/L (ref 3–14)
AST SERPL W P-5'-P-CCNC: 20 U/L (ref 0–45)
BASOPHILS # BLD AUTO: 0 10E9/L (ref 0–0.2)
BASOPHILS NFR BLD AUTO: 0.1 %
BILIRUB DIRECT SERPL-MCNC: <0.1 MG/DL (ref 0–0.2)
BILIRUB SERPL-MCNC: 0.5 MG/DL (ref 0.2–1.3)
BUN SERPL-MCNC: 14 MG/DL (ref 7–30)
CALCIUM SERPL-MCNC: 8.4 MG/DL (ref 8.5–10.1)
CHLORIDE SERPL-SCNC: 105 MMOL/L (ref 94–109)
CO2 SERPL-SCNC: 30 MMOL/L (ref 20–32)
CREAT SERPL-MCNC: 0.72 MG/DL (ref 0.66–1.25)
DIFFERENTIAL METHOD BLD: ABNORMAL
EOSINOPHIL # BLD AUTO: 0.2 10E9/L (ref 0–0.7)
EOSINOPHIL NFR BLD AUTO: 3.2 %
ERYTHROCYTE [DISTWIDTH] IN BLOOD BY AUTOMATED COUNT: 13.8 % (ref 10–15)
GFR SERPL CREATININE-BSD FRML MDRD: >90 ML/MIN/{1.73_M2}
GLUCOSE SERPL-MCNC: 76 MG/DL (ref 70–99)
HCT VFR BLD AUTO: 38.5 % (ref 40–53)
HGB BLD-MCNC: 12.4 G/DL (ref 13.3–17.7)
IMM GRANULOCYTES # BLD: 0 10E9/L (ref 0–0.4)
IMM GRANULOCYTES NFR BLD: 0.1 %
LYMPHOCYTES # BLD AUTO: 2.1 10E9/L (ref 0.8–5.3)
LYMPHOCYTES NFR BLD AUTO: 30.2 %
MAGNESIUM SERPL-MCNC: 2 MG/DL (ref 1.6–2.3)
MCH RBC QN AUTO: 31.6 PG (ref 26.5–33)
MCHC RBC AUTO-ENTMCNC: 32.2 G/DL (ref 31.5–36.5)
MCV RBC AUTO: 98 FL (ref 78–100)
MONOCYTES # BLD AUTO: 1 10E9/L (ref 0–1.3)
MONOCYTES NFR BLD AUTO: 14.9 %
NEUTROPHILS # BLD AUTO: 3.5 10E9/L (ref 1.6–8.3)
NEUTROPHILS NFR BLD AUTO: 51.5 %
NRBC # BLD AUTO: 0 10*3/UL
NRBC BLD AUTO-RTO: 0 /100
PHOSPHATE SERPL-MCNC: 3.6 MG/DL (ref 2.5–4.5)
PLATELET # BLD AUTO: 89 10E9/L (ref 150–450)
POTASSIUM SERPL-SCNC: 3.8 MMOL/L (ref 3.4–5.3)
PROT SERPL-MCNC: 6.9 G/DL (ref 6.8–8.8)
RBC # BLD AUTO: 3.92 10E12/L (ref 4.4–5.9)
SODIUM SERPL-SCNC: 140 MMOL/L (ref 133–144)
TRIGL SERPL-MCNC: 69 MG/DL
WBC # BLD AUTO: 6.8 10E9/L (ref 4–11)

## 2019-01-02 PROCEDURE — 82248 BILIRUBIN DIRECT: CPT | Performed by: SURGERY

## 2019-01-02 PROCEDURE — 85025 COMPLETE CBC W/AUTO DIFF WBC: CPT | Performed by: SURGERY

## 2019-01-02 PROCEDURE — 87106 FUNGI IDENTIFICATION YEAST: CPT | Performed by: SURGERY

## 2019-01-02 PROCEDURE — 84100 ASSAY OF PHOSPHORUS: CPT | Performed by: SURGERY

## 2019-01-02 PROCEDURE — 84478 ASSAY OF TRIGLYCERIDES: CPT | Performed by: SURGERY

## 2019-01-02 PROCEDURE — 83735 ASSAY OF MAGNESIUM: CPT | Performed by: SURGERY

## 2019-01-02 PROCEDURE — 87181 SC STD AGAR DILUTION PER AGT: CPT | Performed by: SURGERY

## 2019-01-02 PROCEDURE — 87040 BLOOD CULTURE FOR BACTERIA: CPT | Performed by: SURGERY

## 2019-01-02 PROCEDURE — 80053 COMPREHEN METABOLIC PANEL: CPT | Performed by: SURGERY

## 2019-01-02 NOTE — TELEPHONE ENCOUNTER
Reason for call:  Other   Patient called regarding (reason for call): Home Infusion nurse is calling stating he needs to speak with someone about compliance because patient gets infections after dressings and they've asked them to stop but they have not.  Additional comments: Call back. Phone is shared so ask for [employer]    Phone number to reach patient:  Other phone number:  [phone]     Best Time:  Any    Can we leave a detailed message on this number?  NO

## 2019-01-02 NOTE — TELEPHONE ENCOUNTER
Reason for Call: Request for an order or referral:    Order or referral being requested: start of home care due to insurance change    Date needed: as soon as possible    Has the patient been seen by the PCP for this problem? YES    Additional comments: start of home care due to insurance change     Phone number Patient can be reached at:  Other phone number:  [phone]*    Best Time:      Can we leave a detailed message on this number?  YES    Call taken on 1/2/2019 at 11:24 AM by Araseli Read

## 2019-01-03 ENCOUNTER — TELEPHONE (OUTPATIENT)
Dept: INFECTIOUS DISEASES | Facility: CLINIC | Age: 49
End: 2019-01-03

## 2019-01-03 ENCOUNTER — HOME INFUSION (PRE-WILLOW HOME INFUSION) (OUTPATIENT)
Dept: PHARMACY | Facility: CLINIC | Age: 49
End: 2019-01-03

## 2019-01-03 NOTE — TELEPHONE ENCOUNTER
Called and spoke with Kvng in regards to patient. States that patient is having compliance issues with family and dressing changes. PICC line continues to get infected after dressing gets changed. Akhil states family members change dressings at random times. Wanted to make clinic and team aware of situation. Advised Akhil that patient needs to follow up here in clinic with Dr. Vyas and team. Has been over 1 year since last seen.

## 2019-01-03 NOTE — TELEPHONE ENCOUNTER
[facility] Call Center    Phone Message    May a detailed message be left on voicemail: yes    Reason for Call: Other: Requesting call back to see if clinic still wants a follow-up appointment after his hospital stay. Please call Rose at [phone].       Action Taken: Message routed to:  [facility] Infectious Disease

## 2019-01-03 NOTE — PROGRESS NOTES
This is a recent snapshot of the patient's [facility] medical record.  For current drug dose and complete information and questions, call [phone] or In Basket pool, fv home infusion ([id number])  CSN Number:  [id number]

## 2019-01-04 ENCOUNTER — HOME INFUSION (PRE-WILLOW HOME INFUSION) (OUTPATIENT)
Dept: PHARMACY | Facility: CLINIC | Age: 49
End: 2019-01-04

## 2019-01-04 ENCOUNTER — APPOINTMENT (OUTPATIENT)
Dept: ULTRASOUND IMAGING | Facility: CLINIC | Age: 49
DRG: 314 | End: 2019-01-04
Attending: OPHTHALMOLOGY
Payer: COMMERCIAL

## 2019-01-04 ENCOUNTER — HOSPITAL ENCOUNTER (INPATIENT)
Facility: CLINIC | Age: 49
LOS: 5 days | Discharge: HOME HEALTH | DRG: 314 | End: 2019-01-09
Attending: EMERGENCY MEDICINE | Admitting: HOSPITALIST
Payer: COMMERCIAL

## 2019-01-04 ENCOUNTER — APPOINTMENT (OUTPATIENT)
Dept: CARDIOLOGY | Facility: CLINIC | Age: 49
DRG: 314 | End: 2019-01-04
Attending: OPHTHALMOLOGY
Payer: COMMERCIAL

## 2019-01-04 ENCOUNTER — PATIENT OUTREACH (OUTPATIENT)
Dept: CARE COORDINATION | Facility: CLINIC | Age: 49
End: 2019-01-04

## 2019-01-04 DIAGNOSIS — Z98.84 S/P BARIATRIC SURGERY: ICD-10-CM

## 2019-01-04 DIAGNOSIS — Z98.84 GASTRIC BYPASS STATUS FOR OBESITY: ICD-10-CM

## 2019-01-04 DIAGNOSIS — B37.9 INFECTION BY CANDIDA SPECIES: Primary | ICD-10-CM

## 2019-01-04 DIAGNOSIS — B37.9 CANDIDIASIS: ICD-10-CM

## 2019-01-04 DIAGNOSIS — R78.81 BACTEREMIA: ICD-10-CM

## 2019-01-04 DIAGNOSIS — K90.829 SHORT GUT SYNDROME: ICD-10-CM

## 2019-01-04 DIAGNOSIS — R63.4 WEIGHT LOSS, NON-INTENTIONAL: ICD-10-CM

## 2019-01-04 DIAGNOSIS — B49 FUNGEMIA: ICD-10-CM

## 2019-01-04 DIAGNOSIS — F41.9 ANXIETY: ICD-10-CM

## 2019-01-04 LAB
ALBUMIN SERPL-MCNC: 3.7 G/DL (ref 3.4–5)
ALBUMIN UR-MCNC: NEGATIVE MG/DL
ALP SERPL-CCNC: 80 U/L (ref 40–150)
ALT SERPL W P-5'-P-CCNC: 26 U/L (ref 0–70)
ANION GAP SERPL CALCULATED.3IONS-SCNC: 6 MMOL/L (ref 3–14)
ANION GAP SERPL CALCULATED.3IONS-SCNC: 7 MMOL/L (ref 3–14)
APPEARANCE UR: CLEAR
AST SERPL W P-5'-P-CCNC: 16 U/L (ref 0–45)
BASOPHILS # BLD AUTO: 0 10E9/L (ref 0–0.2)
BASOPHILS NFR BLD AUTO: 0.4 %
BILIRUB SERPL-MCNC: 0.4 MG/DL (ref 0.2–1.3)
BILIRUB UR QL STRIP: NEGATIVE
BUN SERPL-MCNC: 11 MG/DL (ref 7–30)
BUN SERPL-MCNC: 13 MG/DL (ref 7–30)
CALCIUM SERPL-MCNC: 8.2 MG/DL (ref 8.5–10.1)
CALCIUM SERPL-MCNC: 8.3 MG/DL (ref 8.5–10.1)
CHLORIDE SERPL-SCNC: 104 MMOL/L (ref 94–109)
CHLORIDE SERPL-SCNC: 107 MMOL/L (ref 94–109)
CO2 SERPL-SCNC: 28 MMOL/L (ref 20–32)
CO2 SERPL-SCNC: 29 MMOL/L (ref 20–32)
COLOR UR AUTO: ABNORMAL
CREAT SERPL-MCNC: 0.71 MG/DL (ref 0.66–1.25)
CREAT SERPL-MCNC: 0.85 MG/DL (ref 0.66–1.25)
DIFFERENTIAL METHOD BLD: ABNORMAL
EOSINOPHIL # BLD AUTO: 0.1 10E9/L (ref 0–0.7)
EOSINOPHIL NFR BLD AUTO: 1.9 %
ERYTHROCYTE [DISTWIDTH] IN BLOOD BY AUTOMATED COUNT: 13.9 % (ref 10–15)
ERYTHROCYTE [DISTWIDTH] IN BLOOD BY AUTOMATED COUNT: 13.9 % (ref 10–15)
GFR SERPL CREATININE-BSD FRML MDRD: >90 ML/MIN/{1.73_M2}
GFR SERPL CREATININE-BSD FRML MDRD: >90 ML/MIN/{1.73_M2}
GLUCOSE BLDC GLUCOMTR-MCNC: 106 MG/DL (ref 70–99)
GLUCOSE BLDC GLUCOMTR-MCNC: 99 MG/DL (ref 70–99)
GLUCOSE SERPL-MCNC: 102 MG/DL (ref 70–99)
GLUCOSE SERPL-MCNC: 88 MG/DL (ref 70–99)
GLUCOSE UR STRIP-MCNC: NEGATIVE MG/DL
HCT VFR BLD AUTO: 38.6 % (ref 40–53)
HCT VFR BLD AUTO: 42.9 % (ref 40–53)
HGB BLD-MCNC: 12.1 G/DL (ref 13.3–17.7)
HGB BLD-MCNC: 13.8 G/DL (ref 13.3–17.7)
HGB UR QL STRIP: ABNORMAL
IMM GRANULOCYTES # BLD: 0 10E9/L (ref 0–0.4)
IMM GRANULOCYTES NFR BLD: 0.4 %
KETONES UR STRIP-MCNC: NEGATIVE MG/DL
LEUKOCYTE ESTERASE UR QL STRIP: NEGATIVE
LYMPHOCYTES # BLD AUTO: 1.2 10E9/L (ref 0.8–5.3)
LYMPHOCYTES NFR BLD AUTO: 23.9 %
MAGNESIUM SERPL-MCNC: 1.9 MG/DL (ref 1.6–2.3)
MCH RBC QN AUTO: 31.3 PG (ref 26.5–33)
MCH RBC QN AUTO: 31.7 PG (ref 26.5–33)
MCHC RBC AUTO-ENTMCNC: 31.3 G/DL (ref 31.5–36.5)
MCHC RBC AUTO-ENTMCNC: 32.2 G/DL (ref 31.5–36.5)
MCV RBC AUTO: 100 FL (ref 78–100)
MCV RBC AUTO: 98 FL (ref 78–100)
MONOCYTES # BLD AUTO: 0.5 10E9/L (ref 0–1.3)
MONOCYTES NFR BLD AUTO: 9.3 %
NEUTROPHILS # BLD AUTO: 3.3 10E9/L (ref 1.6–8.3)
NEUTROPHILS NFR BLD AUTO: 64.1 %
NITRATE UR QL: NEGATIVE
NRBC # BLD AUTO: 0 10*3/UL
NRBC BLD AUTO-RTO: 0 /100
PH UR STRIP: 6.5 PH (ref 5–7)
PHOSPHATE SERPL-MCNC: 3.1 MG/DL (ref 2.5–4.5)
PLATELET # BLD AUTO: 123 10E9/L (ref 150–450)
PLATELET # BLD AUTO: 134 10E9/L (ref 150–450)
POTASSIUM SERPL-SCNC: 3.1 MMOL/L (ref 3.4–5.3)
POTASSIUM SERPL-SCNC: 3.5 MMOL/L (ref 3.4–5.3)
POTASSIUM SERPL-SCNC: 3.7 MMOL/L (ref 3.4–5.3)
PROT SERPL-MCNC: 7.2 G/DL (ref 6.8–8.8)
RBC # BLD AUTO: 3.87 10E12/L (ref 4.4–5.9)
RBC # BLD AUTO: 4.36 10E12/L (ref 4.4–5.9)
RBC #/AREA URNS AUTO: <1 /HPF (ref 0–2)
SODIUM SERPL-SCNC: 140 MMOL/L (ref 133–144)
SODIUM SERPL-SCNC: 141 MMOL/L (ref 133–144)
SOURCE: ABNORMAL
SP GR UR STRIP: 1 (ref 1–1.03)
UROBILINOGEN UR STRIP-MCNC: NORMAL MG/DL (ref 0–2)
WBC # BLD AUTO: 4.5 10E9/L (ref 4–11)
WBC # BLD AUTO: 5.2 10E9/L (ref 4–11)
WBC #/AREA URNS AUTO: 1 /HPF (ref 0–5)

## 2019-01-04 PROCEDURE — 25000132 ZZH RX MED GY IP 250 OP 250 PS 637: Performed by: HOSPITALIST

## 2019-01-04 PROCEDURE — 36415 COLL VENOUS BLD VENIPUNCTURE: CPT | Performed by: STUDENT IN AN ORGANIZED HEALTH CARE EDUCATION/TRAINING PROGRAM

## 2019-01-04 PROCEDURE — 80053 COMPREHEN METABOLIC PANEL: CPT | Performed by: EMERGENCY MEDICINE

## 2019-01-04 PROCEDURE — 93306 TTE W/DOPPLER COMPLETE: CPT | Mod: 26 | Performed by: INTERNAL MEDICINE

## 2019-01-04 PROCEDURE — 96375 TX/PRO/DX INJ NEW DRUG ADDON: CPT | Performed by: EMERGENCY MEDICINE

## 2019-01-04 PROCEDURE — 93306 TTE W/DOPPLER COMPLETE: CPT

## 2019-01-04 PROCEDURE — 25000131 ZZH RX MED GY IP 250 OP 636 PS 637: Performed by: STUDENT IN AN ORGANIZED HEALTH CARE EDUCATION/TRAINING PROGRAM

## 2019-01-04 PROCEDURE — 87106 FUNGI IDENTIFICATION YEAST: CPT | Performed by: EMERGENCY MEDICINE

## 2019-01-04 PROCEDURE — 12000001 ZZH R&B MED SURG/OB UMMC

## 2019-01-04 PROCEDURE — 85025 COMPLETE CBC W/AUTO DIFF WBC: CPT | Performed by: EMERGENCY MEDICINE

## 2019-01-04 PROCEDURE — 87070 CULTURE OTHR SPECIMN AEROBIC: CPT | Performed by: HOSPITALIST

## 2019-01-04 PROCEDURE — 96365 THER/PROPH/DIAG IV INF INIT: CPT | Performed by: EMERGENCY MEDICINE

## 2019-01-04 PROCEDURE — 99285 EMERGENCY DEPT VISIT HI MDM: CPT | Mod: 25 | Performed by: EMERGENCY MEDICINE

## 2019-01-04 PROCEDURE — 81001 URINALYSIS AUTO W/SCOPE: CPT | Performed by: EMERGENCY MEDICINE

## 2019-01-04 PROCEDURE — 83735 ASSAY OF MAGNESIUM: CPT | Performed by: STUDENT IN AN ORGANIZED HEALTH CARE EDUCATION/TRAINING PROGRAM

## 2019-01-04 PROCEDURE — 80048 BASIC METABOLIC PNL TOTAL CA: CPT | Performed by: STUDENT IN AN ORGANIZED HEALTH CARE EDUCATION/TRAINING PROGRAM

## 2019-01-04 PROCEDURE — 25000132 ZZH RX MED GY IP 250 OP 250 PS 637: Performed by: STUDENT IN AN ORGANIZED HEALTH CARE EDUCATION/TRAINING PROGRAM

## 2019-01-04 PROCEDURE — 84132 ASSAY OF SERUM POTASSIUM: CPT | Performed by: STUDENT IN AN ORGANIZED HEALTH CARE EDUCATION/TRAINING PROGRAM

## 2019-01-04 PROCEDURE — 99232 SBSQ HOSP IP/OBS MODERATE 35: CPT | Performed by: HOSPITALIST

## 2019-01-04 PROCEDURE — 87086 URINE CULTURE/COLONY COUNT: CPT | Performed by: EMERGENCY MEDICINE

## 2019-01-04 PROCEDURE — 00000146 ZZHCL STATISTIC GLUCOSE BY METER IP

## 2019-01-04 PROCEDURE — 87040 BLOOD CULTURE FOR BACTERIA: CPT | Performed by: EMERGENCY MEDICINE

## 2019-01-04 PROCEDURE — 85027 COMPLETE CBC AUTOMATED: CPT | Performed by: STUDENT IN AN ORGANIZED HEALTH CARE EDUCATION/TRAINING PROGRAM

## 2019-01-04 PROCEDURE — 99285 EMERGENCY DEPT VISIT HI MDM: CPT | Mod: Z6 | Performed by: EMERGENCY MEDICINE

## 2019-01-04 PROCEDURE — 25000128 H RX IP 250 OP 636: Performed by: HOSPITALIST

## 2019-01-04 PROCEDURE — 40000141 ZZH STATISTIC PERIPHERAL IV START W/O US GUIDANCE

## 2019-01-04 PROCEDURE — 25000125 ZZHC RX 250: Performed by: HOSPITALIST

## 2019-01-04 PROCEDURE — 25000128 H RX IP 250 OP 636: Performed by: EMERGENCY MEDICINE

## 2019-01-04 PROCEDURE — 93971 EXTREMITY STUDY: CPT | Mod: RT

## 2019-01-04 PROCEDURE — 87106 FUNGI IDENTIFICATION YEAST: CPT | Performed by: HOSPITALIST

## 2019-01-04 PROCEDURE — 84100 ASSAY OF PHOSPHORUS: CPT | Performed by: STUDENT IN AN ORGANIZED HEALTH CARE EDUCATION/TRAINING PROGRAM

## 2019-01-04 PROCEDURE — 96361 HYDRATE IV INFUSION ADD-ON: CPT | Performed by: EMERGENCY MEDICINE

## 2019-01-04 RX ORDER — POTASSIUM CL/LIDO/0.9 % NACL 10MEQ/0.1L
10 INTRAVENOUS SOLUTION, PIGGYBACK (ML) INTRAVENOUS
Status: DISCONTINUED | OUTPATIENT
Start: 2019-01-04 | End: 2019-01-09 | Stop reason: HOSPADM

## 2019-01-04 RX ORDER — MAGNESIUM SULFATE HEPTAHYDRATE 40 MG/ML
4 INJECTION, SOLUTION INTRAVENOUS EVERY 4 HOURS PRN
Status: DISCONTINUED | OUTPATIENT
Start: 2019-01-04 | End: 2019-01-09 | Stop reason: HOSPADM

## 2019-01-04 RX ORDER — MAGNESIUM SULFATE HEPTAHYDRATE 40 MG/ML
2 INJECTION, SOLUTION INTRAVENOUS DAILY PRN
Status: DISCONTINUED | OUTPATIENT
Start: 2019-01-04 | End: 2019-01-09 | Stop reason: HOSPADM

## 2019-01-04 RX ORDER — HYDROMORPHONE HYDROCHLORIDE 1 MG/ML
0.5 INJECTION, SOLUTION INTRAMUSCULAR; INTRAVENOUS; SUBCUTANEOUS
Status: DISCONTINUED | OUTPATIENT
Start: 2019-01-04 | End: 2019-01-09 | Stop reason: HOSPADM

## 2019-01-04 RX ORDER — ALBUTEROL SULFATE 90 UG/1
2 AEROSOL, METERED RESPIRATORY (INHALATION) EVERY 4 HOURS PRN
Status: DISCONTINUED | OUTPATIENT
Start: 2019-01-04 | End: 2019-01-09 | Stop reason: HOSPADM

## 2019-01-04 RX ORDER — SUCRALFATE ORAL 1 G/10ML
1 SUSPENSION ORAL EVERY 4 HOURS PRN
Status: DISCONTINUED | OUTPATIENT
Start: 2019-01-04 | End: 2019-01-09 | Stop reason: HOSPADM

## 2019-01-04 RX ORDER — OXYCODONE HCL 5 MG/5 ML
10-15 SOLUTION, ORAL ORAL EVERY 4 HOURS PRN
Status: DISCONTINUED | OUTPATIENT
Start: 2019-01-04 | End: 2019-01-09 | Stop reason: HOSPADM

## 2019-01-04 RX ORDER — POTASSIUM CHLORIDE 7.45 MG/ML
10 INJECTION INTRAVENOUS
Status: DISCONTINUED | OUTPATIENT
Start: 2019-01-04 | End: 2019-01-09 | Stop reason: HOSPADM

## 2019-01-04 RX ORDER — LORAZEPAM 1 MG/1
1 TABLET ORAL AT BEDTIME
Status: DISCONTINUED | OUTPATIENT
Start: 2019-01-04 | End: 2019-01-05

## 2019-01-04 RX ORDER — POTASSIUM CHLORIDE 29.8 MG/ML
20 INJECTION INTRAVENOUS
Status: DISCONTINUED | OUTPATIENT
Start: 2019-01-04 | End: 2019-01-09 | Stop reason: HOSPADM

## 2019-01-04 RX ORDER — ONDANSETRON 2 MG/ML
4 INJECTION INTRAMUSCULAR; INTRAVENOUS EVERY 30 MIN PRN
Status: DISCONTINUED | OUTPATIENT
Start: 2019-01-04 | End: 2019-01-09 | Stop reason: HOSPADM

## 2019-01-04 RX ORDER — CYANOCOBALAMIN 1000 UG/ML
1000 INJECTION, SOLUTION INTRAMUSCULAR; SUBCUTANEOUS
Status: DISCONTINUED | OUTPATIENT
Start: 2019-01-04 | End: 2019-01-04 | Stop reason: CLARIF

## 2019-01-04 RX ORDER — ACETAMINOPHEN 325 MG/1
650 TABLET ORAL EVERY 4 HOURS PRN
Status: DISCONTINUED | OUTPATIENT
Start: 2019-01-04 | End: 2019-01-04

## 2019-01-04 RX ORDER — ONDANSETRON 4 MG/1
8 TABLET, ORALLY DISINTEGRATING ORAL EVERY 8 HOURS PRN
Status: DISCONTINUED | OUTPATIENT
Start: 2019-01-04 | End: 2019-01-09 | Stop reason: HOSPADM

## 2019-01-04 RX ORDER — NALOXONE HYDROCHLORIDE 0.4 MG/ML
.1-.4 INJECTION, SOLUTION INTRAMUSCULAR; INTRAVENOUS; SUBCUTANEOUS
Status: DISCONTINUED | OUTPATIENT
Start: 2019-01-04 | End: 2019-01-09 | Stop reason: HOSPADM

## 2019-01-04 RX ORDER — POTASSIUM CHLORIDE 1.5 G/1.58G
20-40 POWDER, FOR SOLUTION ORAL
Status: DISCONTINUED | OUTPATIENT
Start: 2019-01-04 | End: 2019-01-09 | Stop reason: HOSPADM

## 2019-01-04 RX ORDER — DEXTROAMPHETAMINE SACCHARATE, AMPHETAMINE ASPARTATE, DEXTROAMPHETAMINE SULFATE AND AMPHETAMINE SULFATE 5; 5; 5; 5 MG/1; MG/1; MG/1; MG/1
10 TABLET ORAL DAILY
Status: DISCONTINUED | OUTPATIENT
Start: 2019-01-04 | End: 2019-01-04 | Stop reason: CLARIF

## 2019-01-04 RX ORDER — DEXTROAMPHETAMINE SACCHARATE, AMPHETAMINE ASPARTATE, DEXTROAMPHETAMINE SULFATE AND AMPHETAMINE SULFATE 2.5; 2.5; 2.5; 2.5 MG/1; MG/1; MG/1; MG/1
20 TABLET ORAL DAILY
Status: DISCONTINUED | OUTPATIENT
Start: 2019-01-04 | End: 2019-01-09 | Stop reason: HOSPADM

## 2019-01-04 RX ORDER — POTASSIUM CHLORIDE 750 MG/1
20-40 TABLET, EXTENDED RELEASE ORAL
Status: DISCONTINUED | OUTPATIENT
Start: 2019-01-04 | End: 2019-01-09 | Stop reason: HOSPADM

## 2019-01-04 RX ORDER — LORAZEPAM 1 MG/1
1 TABLET ORAL ONCE
Status: COMPLETED | OUTPATIENT
Start: 2019-01-04 | End: 2019-01-05

## 2019-01-04 RX ADMIN — DEXTROAMPHETAMINE SACCHARATE, AMPHETAMINE ASPARTATE, DEXTROAMPHETAMINE SULFATE AND AMPHETAMINE SULFATE 20 MG: 2.5; 2.5; 2.5; 2.5 TABLET ORAL at 08:36

## 2019-01-04 RX ADMIN — ONDANSETRON 8 MG: 4 TABLET, ORALLY DISINTEGRATING ORAL at 17:42

## 2019-01-04 RX ADMIN — OXYCODONE HYDROCHLORIDE 15 MG: 5 SOLUTION ORAL at 10:13

## 2019-01-04 RX ADMIN — ACETAMINOPHEN 650 MG: 325 SOLUTION ORAL at 21:48

## 2019-01-04 RX ADMIN — MICAFUNGIN SODIUM 100 MG: 10 INJECTION, POWDER, LYOPHILIZED, FOR SOLUTION INTRAVENOUS at 03:18

## 2019-01-04 RX ADMIN — Medication 0.5 MG: at 03:09

## 2019-01-04 RX ADMIN — ACETAMINOPHEN 650 MG: 325 SOLUTION ORAL at 17:42

## 2019-01-04 RX ADMIN — OXYCODONE HYDROCHLORIDE 15 MG: 5 SOLUTION ORAL at 15:51

## 2019-01-04 RX ADMIN — LORAZEPAM 1 MG: 1 TABLET ORAL at 21:44

## 2019-01-04 RX ADMIN — ASCORBIC ACID, VITAMIN A PALMITATE, CHOLECALCIFEROL, THIAMINE HYDROCHLORIDE, RIBOFLAVIN-5 PHOSPHATE SODIUM, PYRIDOXINE HYDROCHLORIDE, NIACINAMIDE, DEXPANTHENOL, ALPHA-TOCOPHEROL ACETATE, VITAMIN K1, FOLIC ACID, BIOTIN, CYANOCOBALAMIN: 200; 3300; 200; 6; 3.6; 6; 40; 15; 10; 150; 600; 60; 5 INJECTION, SOLUTION INTRAVENOUS at 17:42

## 2019-01-04 RX ADMIN — Medication 10 MEQ: at 11:58

## 2019-01-04 RX ADMIN — SUCRALFATE 1 G: 1 SUSPENSION ORAL at 10:13

## 2019-01-04 RX ADMIN — SODIUM CHLORIDE 1000 ML: 9 INJECTION, SOLUTION INTRAVENOUS at 03:12

## 2019-01-04 RX ADMIN — OXYCODONE HYDROCHLORIDE 15 MG: 5 SOLUTION ORAL at 21:44

## 2019-01-04 RX ADMIN — ONDANSETRON 8 MG: 4 TABLET, ORALLY DISINTEGRATING ORAL at 08:47

## 2019-01-04 RX ADMIN — Medication 10 MEQ: at 10:13

## 2019-01-04 RX ADMIN — OXYCODONE HYDROCHLORIDE 15 MG: 5 SOLUTION ORAL at 05:31

## 2019-01-04 RX ADMIN — Medication 10 MEQ: at 13:55

## 2019-01-04 RX ADMIN — Medication 10 MEQ: at 11:13

## 2019-01-04 RX ADMIN — ONDANSETRON 4 MG: 2 INJECTION INTRAMUSCULAR; INTRAVENOUS at 03:09

## 2019-01-04 RX ADMIN — MAGNESIUM SULFATE HEPTAHYDRATE 2 G: 40 INJECTION, SOLUTION INTRAVENOUS at 15:51

## 2019-01-04 RX ADMIN — ACETAMINOPHEN 650 MG: 325 SOLUTION ORAL at 12:15

## 2019-01-04 RX ADMIN — LORAZEPAM 1 MG: 1 TABLET ORAL at 05:31

## 2019-01-04 ASSESSMENT — MIFFLIN-ST. JEOR: SCORE: 1836.76

## 2019-01-04 ASSESSMENT — ACTIVITIES OF DAILY LIVING (ADL)
ADLS_ACUITY_SCORE: 11
DEPENDENT_IADLS:: MEDICATION MANAGEMENT;TRANSPORTATION;SHOPPING

## 2019-01-04 NOTE — PHARMACY-ADMISSION MEDICATION HISTORY
Admission medication history interview status for the 1/4/2019 admission is complete. See Epic admission navigator for allergy information, pharmacy, prior to admission medications and immunization status.     Medication history interview sources:  Patient, his family, and H&P Note by Bre Harley MD (1/4/2019)    Changes made to PTA medication list (reason)  Added:   - None  Deleted:   - Ceftriaxone infusion (per Patient - treatment completed; no longer using at home)  - Lidocaine-prilocaine cream (per Patient - no longer using at home)  Changed:   - None    Additional medication history information (including reliability of information, actions taken by pharmacist):  - Patient was alert and willing to answer questions during medication interview.  Patient and his family were reliable historians regarding his medication routine.    - Carvedilol: Patient and his family explained that this medication has been put on hold while he is on antibiotics.  According to Bre Harley's H&P Note from today, carvedilol has been placed on hold due to blood stream infection.  H&P Note also instructed that carvedilol can resume if patient gets hypertensive.  - Cyanocobalamin injection: Patients last injection was on 12/27/2018.  Next injection will be due on 1/26/2019.  - Lidocaine 5% patch: Patient uses this product PRN and has not placed a patch for over a month.  He currently does not have a patch in place.  - TPN: Patient received his last full infusion on Wednesday (1/2/2019).  Patient and his family explained that he received a short 20 minute infusion yesterday afternoon before they were instructed to disconnect and come to the Emergency Department.  - Ergocalciferol: Patient takes this medication weekly, currently every Sunday.      Prior to Admission medications    Medication Sig Last Dose Taking? Auth Provider   acetaminophen (TYLENOL) 32 mg/mL liquid Take 500 mg by mouth every 4 hours as needed for fever or mild  pain 1/3/2019 at PM Yes Reported, Patient   albuterol (PROAIR HFA/PROVENTIL HFA/VENTOLIN HFA) 108 (90 Base) MCG/ACT Inhaler Inhale 2 puffs into the lungs every 4 hours as needed for shortness of breath / dyspnea or wheezing Past Week at Unknown time Yes Esteban Daly MD   amphetamine-dextroamphetamine (ADDERALL) 20 MG tablet Take 1 tablet (20 mg) by mouth daily 1/4/2019 at 0836 Yes Chuy Isaac MD   carvedilol (COREG) 6.25 MG tablet Take 6.25 mg by mouth 2 times daily (with meals) On Hold. Yes Unknown, Entered By History   cyanocobalamin (VITAMIN B12) 1000 MCG/ML injection Inject 1 mL (1,000 mcg) into the muscle every 30 days 12/27/2018 at Unknown time Yes Esteban Daly MD   lidocaine (LIDODERM) 5 % Patch Place 1-2 patches onto the skin every 24 hours Wear for 12 hours, remove for 12 hours.  OK to cut to better fit to size. PRN Yes Patti Milligan MD   LORazepam (ATIVAN) 1 MG tablet 1-2 mg as needed for anxiety with TPN and medications 1/4/2019 at 0531 Yes Chuy Isaac MD   ondansetron (ZOFRAN-ODT) 8 MG ODT tab Take 1 tablet (8 mg) by mouth every 8 hours as needed for nausea 1/4/2019 at 0847 Yes Esteban Daly MD   oxyCODONE (ROXICODONE) 5 MG/5ML solution Take 10-15 mLs (10-15 mg) by mouth every 4 hours as needed for moderate to severe pain Max of 60 mg/day. Fill on/after 12/27/18 not to start untill 12/29/18. 1/4/2019 at 1013 Yes Patti Milligan MD   parenteral nutrition - PTA/DISCHARGE ORDER TPN+lipid Formula per hospitals 10/10/18: volume 1800 mL; AA 95g/d; Dex 235g/d; Intralipid 20% 350 mL; sodium 30 mEq/d; potassium 100 mEq/d; magnesium 16 mEq/d; phosphate 50 mMol/d; infuvite adult 10 mL/d; MTE-5 3mL/d; Cl:Ace 1:2; Infuse intravenously over 14 hours Monday-Friday.    Plain TPN Formula per hospitals 10/10/18: volume 2150 mL; all other ingredients identical to lipid formula. Infuse intravenously over 14 hours Saturday and Sunday. 1/2/2019 at PM Yes Unknown, Entered By History  "  sodium chloride 0.9% infusion Inject 1,000-2,000 mLs into the vein as needed  1/3/2019 at PM Yes Unknown, Entered By History   sucralfate (CARAFATE) 1 GM/10ML suspension Take 10 mLs (1 g) by mouth 4 times daily  Patient taking differently: Take 1 g by mouth every 4 hours as needed (EPIGASTRIC PAIN, BILE BACKUP)  1/4/2019 at 1013 Yes Esteban Daly MD   vitamin D (ERGOCALCIFEROL) 24315 UNIT capsule TAKE 1 CAPSULE BY MOUTH EVERY 7 DAYS 12/30/2018 at AM Yes Esteban Daly MD   [facility] MANAGED PATIENT Contact [facility] for patient specific medication information at [phone] on admission and discharge from the hospital.  Phones are answered 24 hours a day 7 days a week 365 days a year.    Providers - Choose \"CONTINUE HOME MED (no script)\" at discharge if patient treatment with home infusion will continue.   Ilsa Shine PA   Needle, Disp, (BD DISP NEEDLES) 27G X 1/2\" MISC 1 Device every 30 days Use for cyanocobalamin injection once q 30 days.   Esteban Daly MD   order for DME Equipment being ordered: Bilateral knee high chronic venous insufficiency stockings--  mild-moderate pressures.   Esteban Daly MD   order for DME Injection Supplies for Vitamin B12: 3cc syringes w/ 27 gauge needles, 1/2 inch length   Anita Méndez PA-C         Medication history completed by: Steph Pelayo, PD2 Student    "

## 2019-01-04 NOTE — SUMMARY OF CARE
Pt just got here at 5b. His belonging are pant, socks, shoes, jacket, sweater, notebooks, 3 rings, and glasses.

## 2019-01-04 NOTE — PROVIDER NOTIFICATION
Critical result.     Positive blood cultures from R PICC drawn today, showing yeast.    Provider notified.

## 2019-01-04 NOTE — H&P
[facility], [place]    History and Physical - [facility] N[facility] Service        Date of Admission:  1/4/2019    Assessment & Plan   Parker Acevedo Sr. is a 48 year old male admitted on 1/4/2019. He has a history of Celestino-en-Y gastric bypass, esophagojejunostromy, and chronic malnutrition on TPN with recurrent bacteriemia and is admitted for fevers, chills, and blood cultures positive for yeast.    # Positive blood cultures, growing yeast   Patient comes to the ED after blood cultures taken at home came back positive for yeast. He has been having fevers, chills, diaphoresis, myalgia, rash, and weakness since December 25, 2018. In the ED, patient was started on micafungin and repeat blood cultures were drawn. He has a PICC through which he receives TPN; this is his most likely source of infection. TPN is a major risk factor for candidiasis. He did not have a leukocytosis. He has a PICC in place, but in the past he has had multiple ports.   - Continue micafungin 100 mg daily  - Duration of antifungal agents is usually 14 days, but can ask ID for recommendations about   duration  - Follow up repeat blood cultures   - Remove PICC   - AM CBC    # Chronic Malnutrition on TPN  Patient only able to take small bites of food. Otherwise he has severe nausea and vomiting.   - Pharmacy/ nutrition to manage TPN  - Ondansetron for nausea/ vomiting      # Anxiety   # Attention Deficit Hyperactivity Disorder   Takes lorazepam before bed every night   - Continue PTA lorazepam  - Continue PTA amphetamine-dextroamphetamine (Adderall)     # Chronic Pain  Patient has chronic abdominal pain  - Continue PTAoxycodone     # History of Cardiomyopathy  - Currently holding PTA carvedilol in the setting of blood stream infection and risk for sepsis.   - Day team can resume if patient gets hypertensive      Diet: TPN  Fluids: NS for fluids   DVT Prophylaxis: Pneumatic Compression Devices  Sanchez Catheter: not  present  Code Status: Full     Disposition Plan   Expected discharge: 2 - 3 days, recommended to prior living arrangement once antibiotic plan established.  Entered: Bre Harley MD 01/04/2019, 4:12 AM       The patient's care will be discussed in the AM     Bre Harley MD  [facility], [place]  Please see sticky note for cross cover information  ______________________________________________________________________    Chief Complaint   Fevers, chills     History is obtained from the patient    History of Present Illness   Parker Acevedo Sr. is a 48 year old male with a history of Celestino-en-Y gastric bypass, esophagojejunostromy, and chronic malnutrition on TPN with recurrent bacteriemia who comes to the hospital with fevers, chills, and blood cultures positive for yeast. The patient states that since about December 25, 2018, he has been feeling unwell; he has had fevers, chills, diaphoresis, muscle aches, a metalic taste in his mouth, a rash, and generalized fatigue. Because he has had blood stream infections before with similar symptoms, he was suspicious that he had an infection in his blood from his TPN access site. He has home healthcare, so he asked the nursing staff to draw blood cultures. He was called on 1/3/2018 with the update that his blood cultures came back positive for yeast.     On ROS, Mr. Acevedo says that he has baseline abdominal pain. He has nausea and vomiting when he eats; as a result he is not able to eat more than a few small bites and he is almost entirely TPN dependent. He has been on TPN for about 3.5 years, and for the past 2 years, he says that he has had multiple blood stream infections. He has reflux and cough when he lies flat, so he states that he has to sleep sitting up. He has no shortness of breath or chest pain at this time. He does have a cough and some sneezing. He gets headaches, but he is not  complaining of headaches at this time. He also gets dizzy and lightheaded. He has lower extremity swelling.       Review of Systems    The 10 point Review of Systems is negative other than noted in the HPI or here.     Past Medical History    I have reviewed this patient's medical history and updated it with pertinent information if needed.   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)      Chronic abdominal pain      Complication of anesthesia      Difficulty swallowing      Gastric ulcer, unspecified as acute or chronic, without mention of hemorrhage, perforation, or obstruction      Gastro-oesophageal reflux disease      Generalized weakness 1/30/2018     Head injury      Hiatal hernia      Other bladder disorder      Other chronic pain      PONV (postoperative nausea and vomiting)      Severe malnutrition (H)     TPN     Short gut syndrome      Tobacco abuse         Past Surgical History   I have reviewed this patient's surgical history and updated it with pertinent information if needed.  Past Surgical History:   Procedure Laterality Date     AMPUTATION       APPENDECTOMY       BACK SURGERY  11/3/2014    curve in the spine     BIOPSY LYMPH NODE CERVICAL N/A 2/20/2015    Procedure: BIOPSY LYMPH NODE CERVICAL;  Surgeon: Baron Scanlon MD;  Location:  OR     C GASTRIC BYPASS,OBESE<100CM SHAYLEE-EN-Y  2002    lost 300 pounds     CHOLECYSTECTOMY       DISCECTOMY, FUSION CERVICAL ANTERIOR ONE LEVEL, COMBINED N/A 2/15/2017    Procedure: COMBINED DISCECTOMY, FUSION CERVICAL ANTERIOR ONE LEVEL;  Surgeon: Darren Campos MD;  Location:  OR     ENDOSCOPIC INSERTION TUBE GASTROSTOMY  9/9/2013    Procedure: ENDOSCOPIC INSERTION TUBE GASTROSTOMY;;  Surgeon: Francis Vyas MD;  Location:  OR      ENDOSCOPIC ULTRASOUND UPPER GASTROINTESTINAL TRACT (GI)  4/29/2011    Procedure:ENDOSCOPIC ULTRASOUND UPPER GASTROINTESTINAL TRACT (GI); Both Procedures done Conjointly; Surgeon:NEREIDA HOUSER; Location:[facility]     ENDOSCOPIC ULTRASOUND UPPER GASTROINTESTINAL TRACT (GI)  9/9/2013    Procedure: ENDOSCOPIC ULTRASOUND UPPER GASTROINTESTINAL TRACT (GI);  Endoscopic Ultrasound Guide Gastrostomy Tube Placement  C-arm;  Surgeon: Noe Lizarraga MD;  Location: [facility] OR     ENDOSCOPY  03/25/11    EGD, [facility]     ENDOSCOPY  08/04/09    Upper Endoscopy, [facility]     ENDOSCOPY  01/05/09    Upper Endoscopy, [facility]     ESOPHAGOSCOPY, GASTROSCOPY, DUODENOSCOPY (EGD), COMBINED  4/20/2011    Procedure:COMBINED ESOPHAGOSCOPY, GASTROSCOPY, DUODENOSCOPY (EGD); Surgeon:FRANCIS VYAS; [facility]     ESOPHAGOSCOPY, GASTROSCOPY, DUODENOSCOPY (EGD), COMBINED  6/15/2011    Procedure:COMBINED ESOPHAGOSCOPY, GASTROSCOPY, DUODENOSCOPY (EGD); Surgeon:FRANCIS VYAS; [facility]     ESOPHAGOSCOPY, GASTROSCOPY, DUODENOSCOPY (EGD), COMBINED  6/12/2013    Procedure: COMBINED ESOPHAGOSCOPY, GASTROSCOPY, DUODENOSCOPY (EGD);;  Surgeon: Francis Vyas MD;  [facility]: [facility]     ESOPHAGOSCOPY, GASTROSCOPY, DUODENOSCOPY (EGD), COMBINED  11/22/2013    Procedure: COMBINED ESOPHAGOSCOPY, GASTROSCOPY, DUODENOSCOPY (EGD);;  Surgeon: Francis Vyas MD;  [facility]: [facility]     ESOPHAGOSCOPY, GASTROSCOPY, DUODENOSCOPY (EGD), COMBINED  4/30/2014    Procedure: COMBINED ESOPHAGOSCOPY, GASTROSCOPY, DUODENOSCOPY (EGD);  Surgeon: Francis Vyas MD;  [facility]     ESOPHAGOSCOPY, GASTROSCOPY, DUODENOSCOPY (EGD), COMBINED N/A 2/20/2015    Procedure: COMBINED ESOPHAGOSCOPY, GASTROSCOPY, DUODENOSCOPY (EGD), BIOPSY SINGLE OR MULTIPLE;  Surgeon: Baron Scanlon MD;  Location:  OR     ESOPHAGOSCOPY, GASTROSCOPY, DUODENOSCOPY (EGD), COMBINED N/A 9/30/2015    Procedure: COMBINED ESOPHAGOSCOPY,  GASTROSCOPY, DUODENOSCOPY (EGD);  Surgeon: Francis Vyas MD;  Location:  GI     GASTRECTOMY  6/22/2012    Procedure: GASTRECTOMY;  Open Approach, Excise Ulcers,Partial Gastrectomy, Esophagojejunostomy, Hiatal Hernia Repair, Extensive Lysis of Adhesions and Esaphagogastrodudenoscopy.;  Surgeon: Francis Vyas MD;  [facility]: [facility]     GASTROJEJUNOSTOMY  08/26/09    Extensice enterolysis, partial resect. jejunum, part. resect gastric pouch, gastrojejunostomy anastomosis     HC ESOPH/GAS REFLUX TEST W NASAL IMPED ELECTRODE  8/5/2013    Procedure: ESOPHAGEAL IMPEDENCE FUNCTION TEST 1 HOUR OR LESS;  Surgeon: Halie Lang MD;  Location:  GI     HEAD & NECK SURGERY  2/15/2017    C5-C6     HERNIA REPAIR  2006    Umbilical hernia     HERNIORRHAPHY HIATAL  6/22/2012    Procedure: HERNIORRHAPHY HIATAL;;  Surgeon: Francis Vyas MD;  Location:  OR     HERNIORRHAPHY INGUINAL  11/22/2013    Procedure: HERNIORRHAPHY INGUINAL;;  Surgeon: Francis Vyas MD;  Location:  OR     INSERT PORT VASCULAR ACCESS Right 12/19/2017    Procedure: INSERT PORT VASCULAR ACCESS;  Right Chest Port Placement ;  Surgeon: Lisandro Alejandro PA-C;  Location:  OR     INSERT PORT VASCULAR ACCESS Right 8/2/2018    Procedure: INSERT PORT VASCULAR ACCESS;  Place single lumen tunneled central venous access catheter;  Surgeon: Guy Jamil PA-C;  Location:  OR     LAPAROTOMY EXPLORATORY  11/22/2013    Procedure: LAPAROTOMY EXPLORATORY;  Exploratory Laparotomy, Upper Endoscopy, Left Inguinal Hernia Repair;  Surgeon: Francis Vyas MD;  Location:  OR     ORTHOPEDIC SURGERY       PICC INSERTION Right 03/16/2017    5fr DL BioFlo PICC, 42cm (3cm external) in the R medial brachial vein w/ tip in the SVC RA junction.     PICC INSERTION Left 09/23/2017    5fr DL BioFlo PICC, 45cm (1cm external) in the L basilic vein w/ tip in the SVC RA junction.     SHAYLEE EN Y BOWEL  2003     SOFT TISSUE SURGERY   "     SOFT TISSUE SURGERY       THORACIC SURGERY       TONSILLECTOMY          Social History   I have reviewed this patient's social history and updated it with pertinent information if needed. Praker Escobar Curtis Sr.  reports that he has been smoking cigarettes.  He has a 0.75 pack-year smoking history. he has never used smokeless tobacco. He reports that he does not drink alcohol or use drugs.    Family History   I have reviewed this patient's family history and updated it with pertinent information if needed.   Family History   Problem Relation Age of Onset     Gastrointestinal Disease Mother         Crohns disease     Anxiety Disorder Mother      Thyroid Disease Mother         Grave's disease     Cancer Father         ear cancer-skin cancer/melanoma     Breast Cancer Maternal Grandmother      Anxiety Disorder Sister      Diabetes Maternal Uncle      Breast Cancer Other      Hypertension No family hx of      Hyperlipidemia No family hx of      Cerebrovascular Disease No family hx of      Prostate Cancer No family hx of      Depression No family hx of      Anesthesia Reaction No family hx of      Asthma No family hx of      Osteoporosis No family hx of      Genetic Disorder No family hx of      Obesity No family hx of      Mental Illness No family hx of      Substance Abuse No family hx of        Prior to Admission Medications   Prior to Admission Medications   Prescriptions Last Dose Informant Patient Reported? Taking?   [place] [facility] MANAGED PATIENT   No No   Sig: Contact [facility] for patient specific medication information at [phone] on admission and discharge from the hospital.  Phones are answered 24 hours a day 7 days a week 365 days a year.    Providers - Choose \"CONTINUE HOME MED (no script)\" at discharge if patient treatment with home infusion will continue.   LORazepam (ATIVAN) 1 MG tablet   No No   Si-2 mg as needed for anxiety with TPN and medications   Needle, Disp, " "(BD DISP NEEDLES) 27G X 1/2\" MISC   No No   Si Device every 30 days Use for cyanocobalamin injection once q 30 days.   albuterol (PROAIR HFA/PROVENTIL HFA/VENTOLIN HFA) 108 (90 Base) MCG/ACT Inhaler   No No   Sig: Inhale 2 puffs into the lungs every 4 hours as needed for shortness of breath / dyspnea or wheezing   amphetamine-dextroamphetamine (ADDERALL) 20 MG tablet   No No   Sig: Take 1 tablet (20 mg) by mouth daily   carvedilol (COREG) 6.25 MG tablet   Yes No   Sig: Take 6.25 mg by mouth 2 times daily (with meals)   cefTRIAXone (ROCEPHIN) 2 GM vial   No No   Sig: Inject 2 g into the vein every 24 hours   cyanocobalamin (VITAMIN B12) 1000 MCG/ML injection   No No   Sig: Inject 1 mL (1,000 mcg) into the muscle every 30 days   lidocaine (LIDODERM) 5 % Patch   No No   Sig: Place 1-2 patches onto the skin every 24 hours Wear for 12 hours, remove for 12 hours.  OK to cut to better fit to size.   lidocaine-prilocaine (EMLA) cream   No No   Sig: Apply topically as needed for moderate pain   ondansetron (ZOFRAN-ODT) 8 MG ODT tab   No No   Sig: Take 1 tablet (8 mg) by mouth every 8 hours as needed for nausea   order for DME   No No   Sig: Injection Supplies for Vitamin B12: 3cc syringes w/ 27 gauge needles, 1/2 inch length   order for DME   No No   Sig: Equipment being ordered: Bilateral knee high chronic venous insufficiency stockings--  mild-moderate pressures.   oxyCODONE (ROXICODONE) 5 MG/5ML solution   No No   Sig: Take 10-15 mLs (10-15 mg) by mouth every 4 hours as needed for moderate to severe pain Max of 60 mg/day. Fill on/after 18 not to start untill 18.   parenteral nutrition - PTA/DISCHARGE ORDER   Yes No   Sig: TPN+lipid Formula per [facility] 10/10/18: volume 1800 mL; AA 95g/d; Dex 235g/d; Intralipid 20% 350 mL; sodium 30 mEq/d; potassium 100 mEq/d; magnesium 16 mEq/d; phosphate 50 mMol/d; infuvite adult 10 mL/d; MTE-5 3mL/d; Cl:Ace 1:2; Infuse intravenously over 14 hours Monday-Friday.    Plain TPN " Formula per \[facility]\"" 10/10/18: volume 2150 mL; all other ingredients identical to lipid formula. Infuse intravenously over 14 hours Saturday and Sunday.   sodium chloride 0.9% infusion   Yes No   Sig: Inject 1,000-2,000 mLs into the vein as needed    sucralfate (CARAFATE) 1 GM/10ML suspension   No No   Sig: Take 10 mLs (1 g) by mouth 4 times daily   Patient taking differently: Take 1 g by mouth every 4 hours as needed (EPIGASTRIC PAIN, BILE BACKUP)    vitamin D (ERGOCALCIFEROL) 65425 UNIT capsule   No No   Sig: TAKE 1 CAPSULE BY MOUTH EVERY 7 DAYS      Facility-Administered Medications: None     Allergies   Allergies   Allergen Reactions     Bactrim [Sulfamethoxazole W/Trimethoprim] Rash     Penicillins Anaphylaxis     Please see Antimicrobial Management Team allergy assessment note 10/10/2018. Patient reported tolerating amoxicillin.     Doxycycline Rash     Vancomycin Rash     Rash after receiving vancomycin 3/28/16 (red man's?). Tolerated with slower infusion and diphenhydramine premed.       Physical Exam   Vital Signs: Temp: 98.4  F (36.9  C) Temp src: Oral BP: (!) 139/92 Pulse: 73   Resp: 16 SpO2: 99 % O2 Device: None (Room air)    Weight: 204 lbs 3.2 oz    Physical Exam   Constitutional: He is oriented to person, place, and time. No distress.   Sitting upright in bed in NAD    HENT:   Head: Normocephalic and atraumatic.   Mouth/Throat: No oropharyngeal exudate.   Eyes: EOM are normal. Pupils are equal, round, and reactive to light. No scleral icterus.   Neck: Normal range of motion. No tracheal deviation present.   Cardiovascular: Normal rate, regular rhythm and intact distal pulses.   Pulmonary/Chest: Effort normal and breath sounds normal. No respiratory distress.   Abdominal: Soft. Bowel sounds are normal. He exhibits no distension. There is tenderness.   Venting tube in place in left upper quadrant    Musculoskeletal: He exhibits edema. He exhibits no tenderness or deformity.   Neurological: He is alert and  oriented to person, place, and time.   Skin: He is not diaphoretic.   Diffuse rash present on arms and stomach. Small erythematous macules, some with scabs overtop.    Psychiatric: He has a normal mood and affect. His behavior is normal.       Data   Data reviewed today: I reviewed all medications, new labs and imaging results over the last 24 hours.     Recent Labs   Lab 01/04/19  0249 01/02/19  1548   WBC 5.2 6.8   HGB 13.8 12.4*   MCV 98 98   * 89*    140   POTASSIUM 3.7 3.8   CHLORIDE 104 105   CO2 29 30   BUN 13 14   CR 0.85 0.72   ANIONGAP 7 5   SILAS 8.3* 8.4*   GLC 88 76   ALBUMIN 3.7 3.5   PROTTOTAL 7.2 6.9   BILITOTAL 0.4 0.5   ALKPHOS 80 73   ALT 26 22   AST 16 20

## 2019-01-04 NOTE — ED TRIAGE NOTES
Triage Assessment and Note    Vitals:    01/04/19 0206 01/04/19 0208   BP:  133/89   Pulse: 71 74   Temp: 98.5  F (36.9  C)    TempSrc: Oral    SpO2: 99%    Weight: 92.6 kg (204 lb 3.2 oz)        Reason for visit: Pt was called with positive blood cultures. He has a PICC line in the R arm which he says is more tender. Blood cultures were taken from the PICC and from his hand.       Background Information: pt has had  hospital admissions for positive blood cultures and septicemia in the past. He denies drug or alcohol use but smokes daily. He has a kassandra tube for venting.     Home remedies/Treatments:        Brianna King RN   January 4, 2019

## 2019-01-04 NOTE — ED NOTES
[facility], [place]   ED Nurse to Floor Handoff     Parker Acevedo . is a 48 year old male who speaks English and lives with a spouse,  in a home  They arrived in the ED by car from home    ED Chief Complaint: Blood Infection    ED Dx;   Final diagnoses:   Candidiasis         Needed?: No    Allergies:   Allergies   Allergen Reactions     Bactrim [Sulfamethoxazole W/Trimethoprim] Rash     Penicillins Anaphylaxis     Please see Antimicrobial Management Team allergy assessment note 10/10/2018. Patient reported tolerating amoxicillin.     Doxycycline Rash     Vancomycin Rash     Rash after receiving vancomycin 3/28/16 (red man's?). Tolerated with slower infusion and diphenhydramine premed.   .  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)      Chronic abdominal pain      Complication of anesthesia      Difficulty swallowing      Gastric ulcer, unspecified as acute or chronic, without mention of hemorrhage, perforation, or obstruction      Gastro-oesophageal reflux disease      Generalized weakness 1/30/2018     Head injury      Hiatal hernia      Other bladder disorder      Other chronic pain      PONV (postoperative nausea and vomiting)      Severe malnutrition (H)     TPN     Short gut syndrome      Tobacco abuse       Baseline Mental status: WDL  Current Mental Status changes: at basesline    Infection present or suspected this encounter: yes skin/wound/contact  Sepsis suspected: No  Isolation type: No active isolations     Activity level - Baseline/Home:  Independent  Activity Level - Current:   Independent    Bariatric equipment needed?: No    In the ED these meds were given:   Medications   HYDROmorphone (PF) (DILAUDID) injection 0.5 mg  (0.5 mg Intravenous Given 1/4/19 0309)   ondansetron (ZOFRAN) injection 4 mg (4 mg Intravenous Given 1/4/19 0309)   micafungin (MYCAMINE) 100 mg in sodium chloride 0.9 % 100 mL intermittent infusion (100 mg Intravenous New Bag 1/4/19 0318)   0.9% sodium chloride BOLUS (1,000 mLs Intravenous New Bag 1/4/19 0312)       Drips running?  No    Home pump  No    Current LDAs  Peripheral IV 01/04/19 Left Upper forearm (Active)   Site Assessment WDL 1/4/2019  2:37 AM   Phlebitis Scale 0-->no symptoms 1/4/2019  2:37 AM   Infiltration Scale 0 1/4/2019  2:37 AM   Number of days: 0       PICC Double Lumen 06/06/18 Right Brachial vein medial (Active)   Number of days: 212       PICC Double Lumen 10/14/18 Right Brachial vein medial (Active)   Number of days: 82       Gastrostomy/Enterostomy Gastrostomy LUQ 1 18 fr (Active)   Number of days: 2374       Wound 06/05/18 Left;Posterior;Mid;Inner;Lower Arm Ulceration Bruised w/ an unopened sore (Active)   Number of days: 213       Incision/Surgical Site 02/20/15 Bilateral Neck (Active)   Number of days: 1414       Incision/Surgical Site 02/15/17 Neck (Active)   Number of days: 688       Incision/Surgical Site 12/19/17 Right Chest (Active)   Number of days: 381       Labs results:   Labs Ordered and Resulted from Time of ED Arrival Up to the Time of Departure from the ED   CBC WITH PLATELETS DIFFERENTIAL - Abnormal; Notable for the following components:       Result Value    RBC Count 4.36 (*)     Platelet Count 134 (*)     All other components within normal limits   COMPREHENSIVE METABOLIC PANEL - Abnormal; Notable for the following components:    Calcium 8.3 (*)     All other components within normal limits   ROUTINE UA WITH MICROSCOPIC REFLEX TO CULTURE - Abnormal; Notable for the following components:    Blood Urine Small (*)     All other components within normal limits   BLOOD CULTURE   BLOOD CULTURE   URINE CULTURE AEROBIC BACTERIAL       Imaging Studies: No results found for this  or any previous visit (from the past 24 hour(s)).    Recent vital signs:   /83   Pulse 73   Temp 98.5  F (36.9  C) (Oral)   Wt 92.6 kg (204 lb 3.2 oz)   SpO2 99%   BMI 28.08 kg/m      Cardiac Rhythm: Normal Sinus  Pt needs tele? No  Skin/wound Issues: Rash on right arm and forehead    Code Status: Full Code    Pain control: fair    Nausea control: fair    Abnormal labs/tests/findings requiring intervention:     Family present during ED course? Yes   Family Comments/Social Situation comments:     Tasks needing completion: None    Viji Zavaleta, RN    [phone] [facility]  [phone] [facility]

## 2019-01-04 NOTE — CONSULTS
OPHTHALMOLOGY CONSULT NOTE  01/04/19    Patient: Parker Acevedo Sr.  Consulted by: Dr. Alvarez   Reason for Consult: Fungemia, rule out fungal endophthalmitis     HISTORY OF PRESENTING ILLNESS:     Parker Acevedo Sr. is a 48 year old male with PMH of gastric bypass, esophagojejunostromy, and chronic malnutrition (on TPN) and recurrent bacteremia who presented with fevers, chills, and (+) blood cultures for yeast on routine draw from picc line. Started on micafungin.     No visual complaints.       Review of systems were otherwise negative except for that which has been stated above.      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)      Chronic abdominal pain      Complication of anesthesia      Difficulty swallowing      Gastric ulcer, unspecified as acute or chronic, without mention of hemorrhage, perforation, or obstruction      Gastro-oesophageal reflux disease      Generalized weakness 1/30/2018     Head injury      Hiatal hernia      Other bladder disorder      Other chronic pain      PONV (postoperative nausea and vomiting)      Severe malnutrition (H)     TPN     Short gut syndrome      Tobacco abuse        Past Surgical History:   Procedure Laterality Date     AMPUTATION       APPENDECTOMY       BACK SURGERY  11/3/2014    curve in the spine     BIOPSY LYMPH NODE CERVICAL N/A 2/20/2015    Procedure: BIOPSY LYMPH NODE CERVICAL;  Surgeon: Baron Scanlon MD;  Location: [facility]     C GASTRIC BYPASS,OBESE<100CM SHAYLEE-EN-Y  2002    lost 300 pounds     CHOLECYSTECTOMY       DISCECTOMY, FUSION CERVICAL ANTERIOR ONE LEVEL, COMBINED N/A 2/15/2017     Procedure: COMBINED DISCECTOMY, FUSION CERVICAL ANTERIOR ONE LEVEL;  Surgeon: Darren Campos MD;  Location: [facility]     ENDOSCOPIC INSERTION TUBE GASTROSTOMY  9/9/2013    Procedure: ENDOSCOPIC INSERTION TUBE GASTROSTOMY;;  Surgeon: Francis Vyas MD;  [facility]     ENDOSCOPIC ULTRASOUND UPPER GASTROINTESTINAL TRACT (GI)  4/29/2011    Procedure:ENDOSCOPIC ULTRASOUND UPPER GASTROINTESTINAL TRACT (GI); Both Procedures done Conjointly; Surgeon:NEREIDA HOUSER; [facility]     ENDOSCOPIC ULTRASOUND UPPER GASTROINTESTINAL TRACT (GI)  9/9/2013    Procedure: ENDOSCOPIC ULTRASOUND UPPER GASTROINTESTINAL TRACT (GI);  Endoscopic Ultrasound Guide Gastrostomy Tube Placement  C-arm;  Surgeon: Noe Lizarraga MD;  [facility]     ENDOSCOPY  03/25/11    EGD, [facility]     ENDOSCOPY  08/04/09    Upper Endoscopy, [facility]     ENDOSCOPY  01/05/09    Upper Endoscopy, [facility]     ESOPHAGOSCOPY, GASTROSCOPY, DUODENOSCOPY (EGD), COMBINED  4/20/2011    Procedure:COMBINED ESOPHAGOSCOPY, GASTROSCOPY, DUODENOSCOPY (EGD); Surgeon:FRANCIS VYAS; [facility]     ESOPHAGOSCOPY, GASTROSCOPY, DUODENOSCOPY (EGD), COMBINED  6/15/2011    Procedure:COMBINED ESOPHAGOSCOPY, GASTROSCOPY, DUODENOSCOPY (EGD); Surgeon:FRANCIS VYAS; [facility]     ESOPHAGOSCOPY, GASTROSCOPY, DUODENOSCOPY (EGD), COMBINED  6/12/2013    Procedure: COMBINED ESOPHAGOSCOPY, GASTROSCOPY, DUODENOSCOPY (EGD);;  Surgeon: Francis Vyas MD;  [facility]     ESOPHAGOSCOPY, GASTROSCOPY, DUODENOSCOPY (EGD), COMBINED  11/22/2013    Procedure: COMBINED ESOPHAGOSCOPY, GASTROSCOPY, DUODENOSCOPY (EGD);;  Surgeon: Francis Vyas MD;  [facility]     ESOPHAGOSCOPY, GASTROSCOPY, DUODENOSCOPY (EGD), COMBINED  4/30/2014    Procedure: COMBINED ESOPHAGOSCOPY, GASTROSCOPY, DUODENOSCOPY (EGD);  Surgeon: Francis Vyas MD;  [facility]     ESOPHAGOSCOPY, GASTROSCOPY, DUODENOSCOPY (EGD),  COMBINED N/A 2/20/2015    Procedure: COMBINED ESOPHAGOSCOPY, GASTROSCOPY, DUODENOSCOPY (EGD), BIOPSY SINGLE OR MULTIPLE;  Surgeon: Baron Scanlon MD;  Location:  OR     ESOPHAGOSCOPY, GASTROSCOPY, DUODENOSCOPY (EGD), COMBINED N/A 9/30/2015    Procedure: COMBINED ESOPHAGOSCOPY, GASTROSCOPY, DUODENOSCOPY (EGD);  Surgeon: Francis Vyas MD;  Location:  GI     GASTRECTOMY  6/22/2012    Procedure: GASTRECTOMY;  Open Approach, Excise Ulcers,Partial Gastrectomy, Esophagojejunostomy, Hiatal Hernia Repair, Extensive Lysis of Adhesions and Esaphagogastrodudenoscopy.;  Surgeon: Francis Vyas MD;  Location:  OR     GASTROJEJUNOSTOMY  08/26/09    Extensice enterolysis, partial resect. jejunum, part. resect gastric pouch, gastrojejunostomy anastomosis     HC ESOPH/GAS REFLUX TEST W NASAL IMPED ELECTRODE  8/5/2013    Procedure: ESOPHAGEAL IMPEDENCE FUNCTION TEST 1 HOUR OR LESS;  Surgeon: Halie Lang MD;  Location:  GI     HEAD & NECK SURGERY  2/15/2017    C5-C6     HERNIA REPAIR  2006    Umbilical hernia     HERNIORRHAPHY HIATAL  6/22/2012    Procedure: HERNIORRHAPHY HIATAL;;  Surgeon: Francis Vyas MD;  Location:  OR     HERNIORRHAPHY INGUINAL  11/22/2013    Procedure: HERNIORRHAPHY INGUINAL;;  Surgeon: Francis Vyas MD;  [facility]     INSERT PORT VASCULAR ACCESS Right 12/19/2017    Procedure: INSERT PORT VASCULAR ACCESS;  Right Chest Port Placement ;  Surgeon: Lisandro Alejandro PA-C;  Location:  OR     INSERT PORT VASCULAR ACCESS Right 8/2/2018    Procedure: INSERT PORT VASCULAR ACCESS;  Place single lumen tunneled central venous access catheter;  Surgeon: Guy Jamil PA-C;  Location:  OR     LAPAROTOMY EXPLORATORY  11/22/2013    Procedure: LAPAROTOMY EXPLORATORY;  Exploratory Laparotomy, Upper Endoscopy, Left Inguinal Hernia Repair;  Surgeon: Francis Vyas MD;  Location:  OR     ORTHOPEDIC SURGERY       PICC INSERTION Right 03/16/2017     5fr DL BioFlo PICC, 42cm (3cm external) in the R medial brachial vein w/ tip in the SVC RA junction.     PICC INSERTION Left 09/23/2017    5fr DL BioFlo PICC, 45cm (1cm external) in the L basilic vein w/ tip in the SVC RA junction.     SHAYLEE EN Y BOWEL  2003     SOFT TISSUE SURGERY       SOFT TISSUE SURGERY       THORACIC SURGERY       TONSILLECTOMY         EXAMINATION:     Base Eye Exam     Visual Acuity (Cyril Card)       Right Left    Near cc J1+ J1+          Tonometry (Tonopen, 8:53 PM)       Right Left    Pressure 16 14          Pupils       Pupils Dark Light Shape React APD    Right PERRL 3 2 Round Brisk None    Left PERRL 3 2 Round Brisk None          Visual Fields (Counting fingers)       Left Right     Full Full          Extraocular Movement       Right Left     Full, Ortho Full, Ortho          Neuro/Psych     Oriented x3:  Yes    Mood/Affect:  Normal          Dilation     Both eyes:  1.0% Mydriacyl, 1.0% Cyclogyl @ 8:53 PM            Slit Lamp and Fundus Exam     External Exam       Right Left    External Normal Normal          Slit Lamp Exam       Right Left    Lids/Lashes Normal Normal    Conjunctiva/Sclera White and quiet White and quiet    Cornea Clear Clear    Anterior Chamber Deep and quiet Deep and quiet    Iris Round and reactive, then dilated Round and reactive, then dilated    Lens Clear Clear    Vitreous Normal Normal          Fundus Exam       Right Left    Disc Normal, some inferior PPA vs disc drusen Normal    C/D Ratio 0.3 0.3    Macula small yellow exudate/lesion (not fluffy)  just off the superior arcade similar to R, with bright yellow exudate/lesion just temporal to the macula; not white, not fluffy     Vessels Normal Normal    Periphery Normal Normal                Labs/Studies/Imaging Performed  Prelim blood cultures: yeast      ASSESSMENT/PLAN:     Parker Acevedo . is a 48 year old male who presents with fungemia likely secondary to yeast/candida. No vision complaints per  patient. VA intact. SLE normal. Dilated exam notable for 2 small yellow lesions near the macula, 1 in each eye. Are not fluffy in appearance. I took images of the lesions for following. Will plan to follow-up with repeat dilated exam in 24-36 hrs. Continue with systemic anti-fungal per primary team.     Concern for fungal endophthalmitis  - Blood cultures from 1/2/19 with yeast  - No visual concerns per patient   - 2 small lesions appreciated on dilated exam (images obtained)  - Will plan closely-spaced interval dilated exams - next in 24-36 hours  - Ok to continue micafungin at this time - if become more suspicious for fungal endophthalmitis, will recommend altering anti-fungal  - Contact immediately if new vision symptoms arise     It is our pleasure to participate in this patient's care and treatment. Please contact us with any further questions or concerns.      Dennis Pickering MD  Ophthalmology Resident  PGY-2

## 2019-01-04 NOTE — ED PROVIDER NOTES
History     Chief Complaint   Patient presents with     Blood Infection     HPI  Parker Acevedo Sr. is a 48 year old male with history of gastric bypass, dysphagia, chronic abdominal pain as a complication of multiple abdominal surgeries, short gut syndrome, and ultimately on TPN.     Called back for positive blood cultures from PICC and peripheral showing yeast.   Patient with history of candidiasis in the past.  At home he has felt somewhat generally weak and had a fever up to 101.0.  No chest pain no coughing no urinary symptoms.  No new or different abdominal pain.      I have reviewed the Medications, Allergies, Past Medical and Surgical History, and Social History in the Epic system.    Review of Systems   14 point review of symptoms was performed and is negative except as noted above.     Physical Exam   BP: 133/89  Pulse: 71  Temp: 98.5  F (36.9  C)  Weight: 92.6 kg (204 lb 3.2 oz)  SpO2: 99 %      Physical Exam  GEN: Well appearing, non toxic, cooperative and conversant.   HEENT: The head is normocephalic and atraumatic. Pupils are equal round and reactive to light. Extraocular motions are intact. There is no facial swelling. The neck is nontender and supple.   CV: Regular rate and rhythm without murmurs rubs or gallops. 2+ radial pulses bilaterally.  PULM: Clear to auscultation bilaterally.  ABD: Soft, mild ttp along vent site, but chronic per pt. No guarding., nondistended.   EXT: Full range of motion. 1+ edema chronic bilaterally.   NEURO: Cranial nerves II through XII are intact and symmetric. Bilateral upper and lower extremities grossly show full range of motion without any focal deficits.   SKIN: no rash or significant tenderness along PICC line notes on my exam  PSYCH: Calm and cooperative, interactive.     ED Course        Procedures               Labs Ordered and Resulted from Time of ED Arrival Up to the Time of Departure from the ED   CBC WITH PLATELETS DIFFERENTIAL - Abnormal; Notable  for the following components:       Result Value    RBC Count 4.36 (*)     Platelet Count 134 (*)     All other components within normal limits   ROUTINE UA WITH MICROSCOPIC REFLEX TO CULTURE - Abnormal; Notable for the following components:    Blood Urine Small (*)     All other components within normal limits   COMPREHENSIVE METABOLIC PANEL   BLOOD CULTURE   BLOOD CULTURE   URINE CULTURE AEROBIC BACTERIAL            Assessments & Plan (with Medical Decision Making)   48M with chronic abd px, s/p gastric bypass and mult abd surgeries, SGS, on TPN with candidiasis.   Well appearing in the ED, non toxic, afebrile here.   Given HR, will start antifungal now.   Discussed with hospitalist and admitted to medicine for further management. Recent labs from 2 days ago available. Will resend today as well with BCx, pending at time of admission.     I have reviewed the nursing notes.    I have reviewed the findings, diagnosis, plan and need for follow up with the patient.       Medication List      There are no discharge medications for this visit.         Final diagnoses:   Candidiasis       1/4/2019   [facility], [place], EMERGENCY DEPARTMENT     Raul Salcido MD  01/04/19 0301

## 2019-01-04 NOTE — PROGRESS NOTES
This is a recent snapshot of the patient's [facility] medical record.  For current drug dose and complete information and questions, call [phone] or In Basket pool, [facility] home infusion ([id number])  CSN Number:  [id number]

## 2019-01-04 NOTE — PROGRESS NOTES
[facility]  Patient is currently open to home care services with [facility].  The patient is currently receiving RN  services.  [facility]  and team have been notified of patient admission.  [facility] liaison will continue to follow patient during stay.  If appropriate provide orders to resume home care at time of discharge.    Fabiana Willett RN Liaison   [facility]

## 2019-01-04 NOTE — PROGRESS NOTES
Clinic Care Coordination Contact    Situation: Patient chart reviewed by care coordinator.    Background: RN CC reviewing chart for follow up.    Assessment: Patient is currently inpatient at [facility].    Plan/Recommendations: RN CC will monitor for hospital discharge.    Melissa Behl BSN, RN, N  [facility] Care Coordinator  [phone]

## 2019-01-04 NOTE — PROVIDER NOTIFICATION
Critical result.     Positive blood culture from 2nd of January from L arm. Growing yeast as well.    Provider notified.

## 2019-01-04 NOTE — PROGRESS NOTES
"CLINICAL NUTRITION SERVICES - ASSESSMENT NOTE     Nutrition Prescription    RECOMMENDATIONS FOR MDs/PROVIDERS TO ORDER:  None at this time.     Malnutrition Status:    Severe malnutrition in the context of chronic illness.     Recommendations already ordered by Registered Dietitian (RD):  ClinimixE (peripheral) @ 110 ml/hr (2640 ml daily) with 250 ml of 20% IV lipids daily providing 1398 kcals (15 kcal/kg/day), 112 g PRO (1.2 g/kg/day), 132 g Dex, and 36% of kcals from fat.    *This meets 75% of est energy needs and 100% of est protein needs until patient's central line is replace and home TPN is able to be resumed.      Mg and Phos lab draw add-on     Future/Additional Recommendations:  1. Once PICC line replaced, recommend resuming as follows per home PN regimen:   Dosing weight of 75.7 kg   1800 ml daily x 14 hours, 235 g Dex, 95 g AA with 250 ml of 20% IV lipids 5 days/week providing 1679 kcals (22 kcal/kg/day), 1.3 g/kg/day and 18% of kcals from fat.   2. Pending weight trends and fluid status, may consider decreasing caloric provisions of PN.      REASON FOR ASSESSMENT  Parker Escobar Curtis Elizabeth. is a/an 48 year old male assessed by the dietitian for Pharmacy/Nutrition to Start and Manage PN: \"Standard Peripheral Catheter -REQUIRES peripheral formula- Continuation of home TPN.\"      NUTRITION HISTORY  Per chart review:   (H&P): \"He has nausea and vomiting when he eats; as a result he is not able to eat more than a few small bites and he is almost entirely TPN dependent. He has been on TPN for about 3.5 years, and for the past 2 years, he says that he has had multiple blood stream infections.\"     Per  HI on 12/19/18:   - Patient's home TPN regimen as follows:   Dosing weight of 75.7 kg   1800 ml daily x 14 hours, 235 g Dex, 95 g AA with 250 ml of 20% IV lipids 5 days/week providing 1679 kcals (22 kcal/kg/day), 1.3 g/kg/day and 18% of kcals from fat.   - Patient's oral intake at home consists of: 1 yogurt " "daily and bites of food (soft textures). Drinks Gatorade, milk, water.    Information obtained from patient and wife:   - Patient endorsed eating soft foods daily and drinking fluids to maintain hydration.   - TPN was last infused on Wednesday ().   - Patient reported that he still uses his G-tube for decompression. Wife stated that he has an appointment for 1/10 for an exchange.       CURRENT NUTRITION ORDERS  Diet: Regular  Intake/Tolerance: No intake since admission     LABS  TGs (): 69 (WNL)   K+: 3.1 (L), M.9 (WNL) and Phos: 3.1 (WNL)   Alk Phos: 80 (WNL)   ALT/AST: 26/16 (WNL)     MEDICATIONS  Zofran (PRN)     ANTHROPOMETRICS  Height: 181.6 cm (5' 11.5\")  Most Recent Weight: 92.7 kg (204 lb 4.8 oz)    IBW: 80 kg  BMI: Overweight BMI 25-29.9  Weight History: Wt gain trend over the past three months.   Wt Readings from Last 12 Encounters:   19 92.7 kg (204 lb 4.8 oz)   10/30/18 91.6 kg (202 lb)   10/29/18 88.9 kg (196 lb)   10/13/18 87.5 kg (192 lb 14.4 oz)   10/05/18 86.6 kg (191 lb)   10/03/18 79.8 kg (176 lb)   18 77.6 kg (171 lb)   18 77.6 kg (171 lb)   18 87.5 kg (193 lb)   18 87.5 kg (193 lb)   18 87.5 kg (193 lb)   18 88.6 kg (195 lb 6.4 oz)       Dosing Weight: 93 kg (most recent weight on )     ASSESSED NUTRITION NEEDS  Estimated Energy Needs: 1860 - 2325 kcals/day (20 - 25 kcals/kg)  Justification: Maintenance (PN)   Estimated Protein Needs: 112 - 140 grams protein/day (1.2 - 1.5 grams of pro/kg)  Justification: Increased needs d/t gastric bypass   Estimated Fluid Needs: 2325 - 2790 mL/day (25 - 30 mL/kg)   Justification: Maintenance and Per provider pending fluid status    PHYSICAL FINDINGS  See malnutrition section below.  - Lower extremities appear to be fluid up (no official exam completed yet)      MALNUTRITION  % Intake: No decreased intake noted  % Weight Loss: None noted  Subcutaneous Fat Loss: Facial region: Severe and " Thoracic/intercostal: Mild-moderate  Muscle Loss: Temporal, Facial & jaw region, Scapular bone and Thoracic region (clavicle, acromium bone, deltoid, trapezius, pectoral): Moderate   Fluid Accumulation/Edema: Unable to assess (per RN notes patient refused assessment)   Malnutrition Diagnosis: Severe malnutrition in the context of chronic illness.     NUTRITION DIAGNOSIS  Inadequate oral intake related to reliant on PN as evidenced by patient consuming < 25% of estimated needs orally, therefore requiring CPN to meet at least 75% of est nutritional needs.        INTERVENTIONS  Implementation  Nutrition Education: Provided education on starting PPN and resuming home CPN as soon as medically able and role of RD.   Parenteral Nutrition/IV Fluids - Initiate (see above)     Goals  Total avg nutritional intake to meet a minimum of 20 kcal/kg and 1.2 g PRO/kg daily (per dosing wt 93 kg).     Monitoring/Evaluation  Progress toward goals will be monitored and evaluated per protocol.      Estelle Limon RD, LD  5A/5B floor pager [phone]

## 2019-01-04 NOTE — PROGRESS NOTES
Care Coordinator Progress Note    Admission Date/Time:  1/4/2019  Attending MD:  Cecilia Bee,*    Data  Chart reviewed, discussed with interdisciplinary team.   Patient was admitted for: Candidiasis.    Concerns with insurance coverage for discharge needs: None.  Current Living Situation: Patient lives with spouse.  Support System: Supportive and Involved  Services Involved: Home Care and Home Infusion  Transportation at Discharge: Family or friend will provide  Transportation to Medical Appointments:   - Name of caregiver: Spouse  Barriers to Discharge: medical stability    Coordination of Care and Referrals: Provided patient/family with options for Home Infusion.        Assessment  Pt admitted with blood infection.  Pt with a history of gastric bypass, dysphagia, chronic abdominal pain, short gut syndrome on chronic TPN.  Pt status reviewed/discussed with Ronnie Jean-Baptiste 2 Attending.   Per chart review pt currently open to [facility] for home TPN.   Dependent on pt course/LOS may require IV antibiotics.  Email update sent to [facility] and Shaylee Sibley [facility] Liaison.  A formal discharge plan has not been confirmed.          1415 Received email from Shaylee Sibley [facility] Liaison that patient home care agency has been changed to: [facility] [phone].    Spoke with Apolonia ONDINA [facility] regarding insurance coverage for home IV antibiotics. Pt has a BCBS out of state/Medicare replacement plan.  [facility] is awaiting confirmation of home coverage.      1455:  Received confirmation from [facility] that pt does have home IV antibiotic coverage: Pt does have coverage. He is covered 80% until his $3,700 out of pocket is met.      Plan  Anticipated Discharge Date:  TBD  Anticipated Discharge Plan:  TBD    Genesis Rivas RN BSN, PHN RN Care Coordinator  Medicine Teams: Gold 1; Gold 2; ED/Observation   [phone]  Pager: [phone]  Weekend RN Care Coordinator job code * * * [phone]  1/4/2019 9:55 AM

## 2019-01-04 NOTE — CONSULTS
[facility] SERVICE CONSULTATION     Patient:  Parker Acevedo Sr.   Date of birth 1970, Medical record number [id number]  Date of Visit:  01/04/2019  Date of Admission: 1/4/2019  Consult Requester: Cecilia Bee          Assessment and Recommendations:   ASSESSMENT:  47yo M with PMH of Celestino-en-Y gastric bypass, esophagojejunostomy c/b short gut syndrome, and chronic malnutrition on TPN with recurrent bacteremia and is admitted for fevers, chills, and blood culture x1 positive for yeast from home health care blood draw from PICC line. Fungemia is likely related to PICC line as he has a previous history of recurrent port/PICC line infections.     Fungemia is new for him as he has not had a history of fungemia in the last 12 months. He denies any acute vision changes, but an Ophthalmology consult would be recommended to evaluate for endophthalmitis. Additionally, an ECHO should be obtained as initial evaluation for endocarditis in the setting of fevers/chills for 2 weeks.     For now would empirically treat with micafungin until further speciation and sensitivities are available. Would obtain further blood cultures to ensure he is clearing this infection. Agree with removing PICC line.     RECOMMENDATION:  1. Recommend IV Micafungin 100mg q24hr until yeast speciated  2. Recommed ECHO (TTE) to evaluate for endocarditis  3. Recommend Ophthalmology consult for evaluation of endophthalmitis  4. Remove PICC line. Hold on replacing PICC line until blood cultures negative for > 48 hours.   5. Obtain additional blood cultures with am labs to demonstrate clearance of infection    Pt seen and discussed with Dr. Carly Orozco and Dr. Mikaela Ma.     Tiffany Jolly MD  Medicine-Pediatrics, PGY-4       Attestation:  I have reviewed today's vital signs, medications, labs and imaging.  Floor time: 25 minutes, Face-to-face time: 10 minutes, Total time: 35 minutes    Parker Acevedo Sr. was  seen in the hospital by Mikaela Ma on 01/04/2019, with the resident, Dr. Shanae MD. I reviewed the history & exam. Assessment and plan were jointly made.  I agree with and have edited the note and plan of care.      Mikaela Ma MD.  ID Staff  [id number]    ________________________________________________________________    Consult Question: Management of fungemia   Admission Diagnosis: Candidiasis [B37.9]         History of Present Illness:   47yo M with PMH of Celestino-en-Y gastric bypass, esophagojejunostromy c/b short gut syndrome, and chronic malnutrition on TPN with recurrent bacteriemia and is admitted for fevers, chills, and blood cultures positive for yeast.     Current illness course began around [place] with sneezing, runny nose and cough associated with fever of T-max 100.8 F.  He managed fever at home with aspirin, Tylenol and NSAIDs.  He reports this was associated with bloody output from G-tube venting at home.  In addition, he had diarrhea 15 times per day that was dark and junky but nonbloody for 3 days last week, which has since resolved.  He felt his upper respiratory symptoms have improved over the last week but he began having repeat fevers and chills associated with night sweats and fatigue.  His cough has become productive of white phlegm.  He has also noted a rash on his forehead, bilateral arms, abdomen and lower to mid back, which is similar to previous rashes in the setting of bacteremia.  He complains of muscle aches in his arms and legs but he says this is long-standing and likely unrelated to current illness.  He complains of nausea and chronic abdominal pain, which is not significantly changed from baseline.    Home health care nursing tata blood cultures on 1/2/19 from his PICC line. He was then called on 1/3/19 about his blood cultures resulting positive for yeast with the recommendation to present for hospital cares.    Of note, he is TPN-dependent as a result of N/V with PO  intake for about 3.5 years, and for the past 2 years, he says that he has had multiple blood stream infections.     Social history: He lives with his wife in [place], [place] which is about 15 miles from [place].  He has 2 dogs but denies any recent dog bites or scratches. No recent travel or exposure to sick contacts.       Recent culture results include:  Culture Micro   Date Value Ref Range Status   01/04/2019 PENDING  Preliminary   01/04/2019 PENDING  Preliminary   01/04/2019 Cultured on the 1st day of incubation:  Yeast   (A)  Preliminary   01/04/2019   Preliminary    Critical Value/Significant Value, preliminary result only, called to and read back by   MARILOU HURST RN @1452 1/4/19. CT     01/04/2019 No growth after 9 hours  Preliminary   01/02/2019 No growth after 2 days  Preliminary   01/02/2019 Cultured on the 1st day of incubation:  Yeast   (A)  Preliminary   01/02/2019   Preliminary    Critical Value/Significant Value, preliminary result only, called to and read back by  DAVID CRUZ RN 2231 1.3.19 ND     01/02/2019 Culture in progress  Preliminary   12/24/2018 No growth  Final              Review of Systems:   CONSTITUTIONAL:  +subjective fevers/chills  EYES: negative for icterus; no vision changes   ENT:  negative for hearing loss, tinnitus and sore throat  RESPIRATORY:  +cough with productive sputum   CARDIOVASCULAR:  negative for chest pain, dyspnea  GASTROINTESTINAL: +nausea, vomiting at baseline   GENITOURINARY:  negative for dysuria  HEME:  No easy bruising  INTEGUMENT:  +new rash on arms, abdomen, legs and back   NEURO:  Negative for headache           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)      Chronic abdominal pain      Complication of  anesthesia      Difficulty swallowing      Gastric ulcer, unspecified as acute or chronic, without mention of hemorrhage, perforation, or obstruction      Gastro-oesophageal reflux disease      Generalized weakness 1/30/2018     Head injury      Hiatal hernia      Other bladder disorder      Other chronic pain      PONV (postoperative nausea and vomiting)      Severe malnutrition (H)     TPN     Short gut syndrome      Tobacco abuse             Past Surgical History:     Past Surgical History:   Procedure Laterality Date     AMPUTATION       APPENDECTOMY       BACK SURGERY  11/3/2014    curve in the spine     BIOPSY LYMPH NODE CERVICAL N/A 2/20/2015    Procedure: BIOPSY LYMPH NODE CERVICAL;  Surgeon: Baron Scanlon MD;  Location: [facility]     C GASTRIC BYPASS,OBESE<100CM SHAYLEE-EN-Y  2002    lost 300 pounds     CHOLECYSTECTOMY       DISCECTOMY, FUSION CERVICAL ANTERIOR ONE LEVEL, COMBINED N/A 2/15/2017    Procedure: COMBINED DISCECTOMY, FUSION CERVICAL ANTERIOR ONE LEVEL;  Surgeon: Darren Campos MD;  Location: [facility]     ENDOSCOPIC INSERTION TUBE GASTROSTOMY  9/9/2013    Procedure: ENDOSCOPIC INSERTION TUBE GASTROSTOMY;;  Surgeon: Francis Vyas MD;  [facility]     ENDOSCOPIC ULTRASOUND UPPER GASTROINTESTINAL TRACT (GI)  4/29/2011    Procedure:ENDOSCOPIC ULTRASOUND UPPER GASTROINTESTINAL TRACT (GI); Both Procedures done Conjointly; Surgeon:NEREIDA HOUSER; [facility]     ENDOSCOPIC ULTRASOUND UPPER GASTROINTESTINAL TRACT (GI)  9/9/2013    Procedure: ENDOSCOPIC ULTRASOUND UPPER GASTROINTESTINAL TRACT (GI);  Endoscopic Ultrasound Guide Gastrostomy Tube Placement  C-arm;  Surgeon: Noe Lizarraga MD;  [facility]     ENDOSCOPY  03/25/11    EGD, [facility]     ENDOSCOPY  08/04/09    Upper Endoscopy, [facility]     ENDOSCOPY  01/05/09    Upper Endoscopy, [facility]     ESOPHAGOSCOPY, GASTROSCOPY, DUODENOSCOPY (EGD), COMBINED  4/20/2011    Procedure:COMBINED  ESOPHAGOSCOPY, GASTROSCOPY, DUODENOSCOPY (EGD); Surgeon:BLU VYAS; [facility]     ESOPHAGOSCOPY, GASTROSCOPY, DUODENOSCOPY (EGD), COMBINED  6/15/2011    Procedure:COMBINED ESOPHAGOSCOPY, GASTROSCOPY, DUODENOSCOPY (EGD); Surgeon:BLU VYAS; [facility]     ESOPHAGOSCOPY, GASTROSCOPY, DUODENOSCOPY (EGD), COMBINED  6/12/2013    Procedure: COMBINED ESOPHAGOSCOPY, GASTROSCOPY, DUODENOSCOPY (EGD);;  Surgeon: Blu Vyas MD;  [facility]     ESOPHAGOSCOPY, GASTROSCOPY, DUODENOSCOPY (EGD), COMBINED  11/22/2013    Procedure: COMBINED ESOPHAGOSCOPY, GASTROSCOPY, DUODENOSCOPY (EGD);;  Surgeon: Blu Vyas MD;  [facility]     ESOPHAGOSCOPY, GASTROSCOPY, DUODENOSCOPY (EGD), COMBINED  4/30/2014    Procedure: COMBINED ESOPHAGOSCOPY, GASTROSCOPY, DUODENOSCOPY (EGD);  Surgeon: Blu Vyas MD;  [facility]     ESOPHAGOSCOPY, GASTROSCOPY, DUODENOSCOPY (EGD), COMBINED N/A 2/20/2015    Procedure: COMBINED ESOPHAGOSCOPY, GASTROSCOPY, DUODENOSCOPY (EGD), BIOPSY SINGLE OR MULTIPLE;  Surgeon: Baron Scanlon MD;  Location:  OR     ESOPHAGOSCOPY, GASTROSCOPY, DUODENOSCOPY (EGD), COMBINED N/A 9/30/2015    Procedure: COMBINED ESOPHAGOSCOPY, GASTROSCOPY, DUODENOSCOPY (EGD);  Surgeon: Blu Vyas MD;  [facility]     GASTRECTOMY  6/22/2012    Procedure: GASTRECTOMY;  Open Approach, Excise Ulcers,Partial Gastrectomy, Esophagojejunostomy, Hiatal Hernia Repair, Extensive Lysis of Adhesions and Esaphagogastrodudenoscopy.;  Surgeon: Blu Vyas MD;  [facility]     GASTROJEJUNOSTOMY  08/26/09    Extensice enterolysis, partial resect. jejunum, part. resect gastric pouch, gastrojejunostomy anastomosis     HC ESOPH/GAS REFLUX TEST W NASAL IMPED ELECTRODE  8/5/2013    Procedure: ESOPHAGEAL IMPEDENCE FUNCTION TEST 1 HOUR OR LESS;  Surgeon: Halie Lang MD;  Location:  GI     HEAD & NECK SURGERY  2/15/2017    C5-C6     HERNIA REPAIR  2006     Umbilical hernia     HERNIORRHAPHY HIATAL  6/22/2012    Procedure: HERNIORRHAPHY HIATAL;;  Surgeon: Francis Vyas MD;  [facility]     HERNIORRHAPHY INGUINAL  11/22/2013    Procedure: HERNIORRHAPHY INGUINAL;;  Surgeon: Francis Vyas MD;  [facility]     INSERT PORT VASCULAR ACCESS Right 12/19/2017    Procedure: INSERT PORT VASCULAR ACCESS;  Right Chest Port Placement ;  Surgeon: Lisandro Alejandro PA-C;  [facility]     INSERT PORT VASCULAR ACCESS Right 8/2/2018    Procedure: INSERT PORT VASCULAR ACCESS;  Place single lumen tunneled central venous access catheter;  Surgeon: Guy Jamil PA-C;  [facility]     LAPAROTOMY EXPLORATORY  11/22/2013    Procedure: LAPAROTOMY EXPLORATORY;  Exploratory Laparotomy, Upper Endoscopy, Left Inguinal Hernia Repair;  Surgeon: Francis Vyas MD;  [facility]     ORTHOPEDIC SURGERY       PICC INSERTION Right 03/16/2017    5fr DL BioFlo PICC, 42cm (3cm external) in the R medial brachial vein w/ tip in the SVC RA junction.     PICC INSERTION Left 09/23/2017    5fr DL BioFlo PICC, 45cm (1cm external) in the L basilic vein w/ tip in the SVC RA junction.     SHAYLEE EN Y BOWEL  2003     SOFT TISSUE SURGERY       SOFT TISSUE SURGERY       THORACIC SURGERY       TONSILLECTOMY              Family History:     Family History   Problem Relation Age of Onset     Gastrointestinal Disease Mother         Crohns disease     Anxiety Disorder Mother      Thyroid Disease Mother         Grave's disease     Cancer Father         ear cancer-skin cancer/melanoma     Breast Cancer Maternal Grandmother      Anxiety Disorder Sister      Diabetes Maternal Uncle      Breast Cancer Other      Hypertension No family hx of      Hyperlipidemia No family hx of      Cerebrovascular Disease No family hx of      Prostate Cancer No family hx of      Depression No family hx of      Anesthesia Reaction No family hx of      Asthma No family hx of      Osteoporosis No  "family hx of      Genetic Disorder No family hx of      Obesity No family hx of      Mental Illness No family hx of      Substance Abuse No family hx of             Social History:     Social History     Tobacco Use     Smoking status: Current Some Day Smoker     Packs/day: 0.25     Years: 3.00     Pack years: 0.75     Types: Cigarettes     Last attempt to quit: 2018     Years since quittin.4     Smokeless tobacco: Never Used     Tobacco comment: I use an e cig every now and than   Substance Use Topics     Alcohol use: No     Comment: quit 2002     History   Sexual Activity     Sexual activity: Yes     Partners: Female     Birth control/ protection: None     Comment: no protection            Current Medications (antimicrobials listed in bold):       amphetamine-dextroamphetamine  20 mg Oral Daily     lipids  250 mL Intravenous Q24H     LORazepam  1 mg Oral At Bedtime     [START ON 2019] micafungin  100 mg Intravenous Q24H            Allergies:     Allergies   Allergen Reactions     Bactrim [Sulfamethoxazole W/Trimethoprim] Rash     Penicillins Anaphylaxis     Please see Antimicrobial Management Team allergy assessment note 10/10/2018. Patient reported tolerating amoxicillin.     Doxycycline Rash     Vancomycin Rash     Rash after receiving vancomycin 3/28/16 (red man's?). Tolerated with slower infusion and diphenhydramine premed.            Physical Exam:   Vitals were reviewed  Patient Vitals for the past 24 hrs:   BP Temp Temp src Pulse Resp SpO2 Height Weight   19 1433 134/89 100.7  F (38.2  C) Oral 99 16 96 % -- --   19 1300 -- 100.5  F (38.1  C) Oral -- -- -- -- --   19 1100 -- 102.1  F (38.9  C) Oral -- -- -- -- --   19 0636 131/82 99.6  F (37.6  C) Oral 85 16 96 % -- --   19 0422 128/79 96.8  F (36  C) Oral 74 16 97 % 1.816 m (5' 11.5\") 92.7 kg (204 lb 4.8 oz)   19 0407 131/88 -- -- 73 -- 98 % -- --   19 0351 (!) 139/92 98.4  F (36.9  C) Oral 73 16 99 % " -- --   01/04/19 0315 135/83 -- -- 73 -- 99 % -- --   01/04/19 0242 126/76 -- -- 74 -- 99 % -- --   01/04/19 0208 133/89 -- -- 74 -- -- -- --   01/04/19 0206 -- 98.5  F (36.9  C) Oral 71 -- 99 % -- 92.6 kg (204 lb 3.2 oz)     Ranges for his vital signs:  Temp:  [96.8  F (36  C)-102.1  F (38.9  C)] 100.7  F (38.2  C)  Pulse:  [71-99] 99  Resp:  [16] 16  BP: (126-139)/(76-92) 134/89  SpO2:  [96 %-99 %] 96 %    Physical Examination:  GENERAL:  well-developed, well-nourished, in bed in no acute distress.   HEENT:  Head is normocephalic, atraumatic   EYES:  Eyes have anicteric sclerae without conjunctival injection or stigmata of endocarditis.    ENT:  Oropharynx is moist without exudates or ulcers. Tongue is midline  NECK:  Supple. No  Cervical lymphadenopathy  LUNGS:  Clear to auscultation bilateral.   CARDIOVASCULAR:  Regular rate and rhythm with no murmurs, gallops or rubs.  ABDOMEN:  Hypoactive bowel sounds, soft, nontender. G tube in place in L upper abdomen   SKIN:  No acute rashes.  RUE wrapped after PICC line removal   NEUROLOGIC:  Grossly nonfocal. Active x4 extremities         Laboratory Data:     Inflammatory Markers    Recent Labs   Lab Test 08/20/18  1442 06/28/18  1400 06/21/18  1148 06/02/18  0609 06/01/18  1807 02/16/18  1515 02/12/18  1400 01/23/18  0845 01/20/18  1030  09/24/17  1900  06/28/17  2222 03/12/17  0351  11/01/16  1530  04/20/16  1530 04/11/16  1842   SED  --   --   --   --   --   --   --  10 11  --  31*  --  26* 17*  --  27*  --  34* 11   CRP <2.9 <2.9 <2.9 30.0* 26.0* 8.2* <2.9 <2.9 5.4   < > 26.6*   < > 53.0* 3.4   < > 8.4*   < > 12.3* 80.0*    < > = values in this interval not displayed.       Hematology Studies    Recent Labs   Lab Test 01/04/19  0543 01/04/19  0249 01/02/19  1548 12/18/18  1407 12/03/18  1445 11/19/18  1400 11/08/18  1532  10/15/18  1450   WBC 4.5 5.2 6.8 8.0 9.6 9.5 7.4   < > 6.8   ANEU  --  3.3 3.5 4.3 5.1 7.0 4.1  --  3.0   AEOS  --  0.1 0.2  --  0.3 0.1 0.2  --   0.4   HGB 12.1* 13.8 12.4* 13.0* 12.4* 12.2* 13.1*   < > 12.2*    98 98 99 100 98 99   < > 97   * 134* 89* 150 154 139* 106*   < > 175    < > = values in this interval not displayed.     Metabolic Studies     Recent Labs   Lab Test 01/04/19  0543 01/04/19  0249 01/02/19  1548 12/18/18  1407 12/03/18  1445    140 140 142 143   POTASSIUM 3.1* 3.7 3.8 4.4 4.0   CHLORIDE 107 104 105 108 107   CO2 28 29 30 26 31   BUN 11 13 14 12 14   CR 0.71 0.85 0.72 0.60* 0.90   GFRESTIMATED >90 >90 >90 >90 >90       Hepatic Studies    Recent Labs   Lab Test 01/04/19  0249 01/02/19  1548 12/18/18  1407 12/03/18  1445 11/19/18  1400 11/08/18  1532   BILITOTAL 0.4 0.5 0.8 0.4 0.8 0.6   ALKPHOS 80 73 81 76 80 80   ALBUMIN 3.7 3.5 3.7 3.5 3.6 3.9   AST 16 20 18 20 18 14   ALT 26 22 27 21 19 32       Thyroid Studies    Recent Labs   Lab Test 11/03/14  1355 05/20/14  1252   TSH 0.96 1.39       Microbiology:  Culture Micro   Date Value Ref Range Status   01/04/2019 PENDING  Preliminary   01/04/2019 PENDING  Preliminary   01/04/2019 Cultured on the 1st day of incubation:  Yeast   (A)  Preliminary   01/04/2019   Preliminary    Critical Value/Significant Value, preliminary result only, called to and read back by   MARILOU HURST RN @1452 1/4/19. CT     01/04/2019 No growth after 9 hours  Preliminary   01/02/2019 No growth after 2 days  Preliminary   01/02/2019 Cultured on the 1st day of incubation:  Yeast   (A)  Preliminary   01/02/2019   Preliminary    Critical Value/Significant Value, preliminary result only, called to and read back by  DAVID CRUZ, CHRISTY 2231 1.3.19 NDP     01/02/2019 Culture in progress  Preliminary   12/24/2018 No growth  Final   12/24/2018 No growth  Final   11/08/2018 No growth  Final   11/08/2018 No growth  Final   10/20/2018 No growth  Final   10/20/2018 No growth  Final   10/13/2018 (A)  Final    Cultured on the 4th day of incubation:  Achromobacter (Alcaligenes) denitrificans     10/13/2018    Final    Critical Value/Significant Value, preliminary result only, called to and read back by  Dr. Chantale Mendez @2145 on 10/18/18. ch.     10/13/2018 Susceptibility testing done on previous specimen  Final   10/13/2018 (A)  Final    Cultured on the 2nd day of incubation:  Achromobacter (Alcaligenes) denitrificans     10/13/2018   Final    Critical Value/Significant Value, preliminary result only, called to and read back by  Rigo Dickerson MD at 0848 10.16.18.     10/13/2018   Final    (Note)  NEGATIVE for the following organisms and resistance markers:  Acinetobacter sp., Citrobacter sp., Enterobacter sp., Proteus sp., E.  coli, K. pneumoniae/oxytoca, P. aeruginosa, CTX-M, KPC, NDM, VIM, IMP  and OXA by [employer] multiplex nucleic acid test. Final identification  and antimicrobial susceptibility testing will be verified by standard  methods.    Critical Value/Significant Value called to and read back by ;Dr. Rigo Dickreson [id number] @1117   10/16/2018 gd       10/13/2018 No growth  Final   10/13/2018 No growth  Final   10/13/2018 No growth  Final   10/12/2018 (A)  Final    Cultured on the 1st day of incubation:  Achromobacter (Alcaligenes) denitrificans  Susceptibility testing done on previous specimen     10/12/2018   Final    Critical Value/Significant Value, preliminary result only, called to and read back by  Jael Lanza at 0829 on 10/13/18 ac.     10/12/2018 No growth  Final   10/12/2018 No growth  Final   10/11/2018 (A)  Final    Cultured on the 1st day of incubation:  Achromobacter (Alcaligenes) denitrificans     10/11/2018   Final    Critical Value/Significant Value, preliminary result only, called to and read back by  Pinky Dobson RN [facility] on 10.12.18 at 1157 RD.      10/11/2018 No growth  Final   10/10/2018 No growth  Final   10/10/2018 (A)  Final    Cultured on the 1st day of incubation:  Streptococcus salivarius  Susceptibility testing done on previous specimen     10/10/2018   Final    Critical  Value/Significant Value, preliminary result only, called to and read back by  Tanya NEIL on 10.10.18 at 1028. RD.      10/10/2018 (A)  Final    Cultured on the 1st day of incubation:  Achromobacter (Alcaligenes) denitrificans  Susceptibility testing done on previous specimen     10/08/2018 (A)  Final    Cultured on the 1st day of incubation:  Streptococcus salivarius     10/08/2018   Final    Critical Value/Significant Value, preliminary result only, called to and read back by  Dr. Daly on 10.9.18 at 0800. bw     10/08/2018 (A)  Final    Cultured on the 1st day of incubation:  Achromobacter (Alcaligenes) denitrificans     10/08/2018   Final    Paged Dr. Esteban Daly x2 on 10/10/18 to notify of second organism.  No call back   10/08/2018   Final    Susceptibility testing requested by  Chantale Garza on Achromobacter for Ceftriaxone.10/12/18 at 1122.TV.     10/08/2018   Final    (Note)  NEGATIVE for the following: Staphylococcus spp., Staph aureus, Staph  epidermidis, Staph lugdunensis, Streptococcus spp., Strep pneumoniae,  Strep pyogenes, Strep agalactiae, Strep anginosus group, Enterococcus  faecalis, Enterococcus faecium, and Listeria spp. by [employer]  multiplex nucleic acid test. Final identification and antimicrobial  susceptibility testing will be verified by standard methods.    Critical Value/Significant Value called to and read back by Nafisa Escalante RN ([facility]) at 1025 on 10.9.18 RD.        10/08/2018 (A)  Final    Cultured on the 1st day of incubation:  Staphylococcus hominis  This isolate DOES NOT demonstrate inducible clindamycin resistance in vitro. Clindamycin   is susceptible and could be used when indicated, however, erythromycin is resistant and   should not be used.     10/08/2018   Final    Critical Value/Significant Value, preliminary result only, called to and read back by  Nafisa Escalante RN on 10.9.18 at 1407. bw     10/08/2018 (A)  Final    Cultured on the 2nd day of  incubation:  Gram positive bacilli resembling diphtheroids  No further identification     10/08/2018   Final    Critical Value/Significant Value, preliminary result only, called to and read back by  Jessica NEIL on 10.10.18 at 1347. RD.     10/08/2018   Final    (Note)  POSITIVE for Staphylococci other than S.aureus, S.epidermidis and  S.lugdunensis, by [employer] multiplex nucleic acid test.  Coagulase-negative staphylococci are the most common venipuncture or  collection associated skin CONTAMINANTS grown in blood cultures.  Final identification and antimicrobial susceptibility testing will be  verified by standard methods.    Specimen tested with [employer] multiplex, gram-positive blood culture  nucleic acid test for the following targets: Staph aureus, Staph  epidermidis, Staph lugdunensis, other Staph species, Enterococcus  faecalis, Enterococcus faecium, Streptococcus species, S. agalactiae,  S. anginosus grp., S. pneumoniae, S. pyogenes, Listeria sp., mecA  (methicillin resistance) and Melvin/B (vancomycin resistance).    Critical Value/Significant Value called to and read back by Nafisa Aponte RN @ 9811 10/9/18 CS      NEGATIVE for the following: Staphylococcus spp., Staph aureus, Staph  epidermidis, Staph lugdunensis, Streptococcus spp., Strep pneumoniae,  Strep pyogenes, Strep agalactiae, Strep anginosus group, Enterococcus  faecalis, Enterococcus faecium, and Listeria spp. by [employer]  multiplex nucleic acid test. Final identification and antimicrobial  susceptibility testing will be verified by standard methods.    Critical Value/Significant Value called to and read back by Hany Tyler RN, @5804 10/10/18..     06/28/2018 No growth  Final   06/28/2018 No growth  Final   06/21/2018 No growth  Final   06/21/2018 No growth  Final   06/04/2018 No growth  Final   06/04/2018 No growth  Final   06/04/2018 No growth  Final   06/02/2018 Canceled, Test credited  Final   06/02/2018 Canceled via EPIC  interface  Final   06/02/2018 Canceled, Test credited  Final   06/02/2018 Canceled via EPIC interface  Final   06/01/2018 No growth  Final   06/01/2018 (A)  Final    Cultured on the 1st day of incubation:  Enterococcus faecalis     06/01/2018   Final    Critical Value/Significant Value, preliminary result only, called to and read back by  ROGER MARTIN RN [facility] 0455 06.02.18 CF     06/01/2018 Susceptibility testing done on previous specimen  Final   05/31/2018 (A)  Final    Cultured on the 1st day of incubation:  Enterococcus faecalis  Susceptibility testing done on previous specimen     05/31/2018   Final    Critical Value/Significant Value, preliminary result only, called to and read back by  Savannah Solitario RN from [facility]. 6.1.18 at 0525. GR.     05/31/2018 (A)  Final    Cultured on the 1st day of incubation:  Staphylococcus epidermidis     05/31/2018   Final    Critical Value/Significant Value called to and read back by  Maday Tan RN 5A at 1215 on 6/4/18 by ALMA.     05/31/2018 (A)  Final    Cultured on the 1st day of incubation:  Enterococcus faecalis     05/31/2018   Final    Critical Value/Significant Value, preliminary result only, called to and read back by  Savannah Jarrett, on call RN for [facility]. 6.1.18 at 0237. GR.      05/31/2018 (A)  Final    Cultured on the 1st day of incubation:  Staphylococcus haemolyticus     05/31/2018   Final    Critical Value/Significant Value called to and read back by  Maday Tan RN at 1515 on 6/4/18 by ALMA.     05/31/2018   Final    (Note)  POSITIVE for ENTEROCOCCUS FAECALIS and NEGATIVE for Melvin/vanB genes  by Verigene multiplex nucleic acid test. Final identification and  antimicrobial susceptibility testing will be verified by standard  methods.    Specimen tested with Verigene multiplex, gram-positive blood culture  nucleic acid test for the following targets: Staph aureus, Staph  epidermidis, Staph lugdunensis, other Staph species, Enterococcus  faecalis,  Enterococcus faecium, Streptococcus species, S. agalactiae,  S. anginosus grp., S. pneumoniae, S. pyogenes, Listeria sp., mecA  (methicillin resistance) and Melvin/B (vancomycin resistance).    Critical Value/Significant Value called to and read back by Savannah Jarrett RN from [facility]. 6.1.18 at 0521. GR.     01/15/2018 <15 colonies   Escherichia coli   (A)  Final   01/14/2018 No growth  Final   01/14/2018 No growth  Final   01/13/2018 (A)  Final    Cultured on the 1st day of incubation:  Enterococcus faecalis  Susceptibility testing done on previous specimen     01/13/2018 (A)  Final    Upon further review of the original slide, no gram negative rods are seen  Previously reported as:  Cultured on the 1st day of incubation:  Gram negative rods  CORRECTED ON:  1.20.18 @1407 AV     01/13/2018   Final    Critical Value/Significant Value, preliminary result only, called to and read back by  Taylor Bhatt RN from [facility]. 1.14.18 at 0111. GR.     01/13/2018   Final    Corrected report called to and read back by  DENNIS Dooley 1.20.18 @1410 AV     01/13/2018 (A)  Final    Cultured on the 1st day of incubation:  Enterococcus faecalis  Susceptibility testing done on previous specimen     01/13/2018   Final    Critical Value/Significant Value, preliminary result only, called to and read back by  Sohrty Montgomery RN at 1953 on 01.13.18 by mp     01/13/2018 (A)  Final    Cultured on the 1st day of incubation:  Staphylococcus hominis     01/13/2018   Final    Critical Value/Significant Value called to and read back by  Kasia Jones RN on 1.16.2018 at 1114. KVO     01/12/2018 (A)  Final    Cultured on the 1st day of incubation:  Escherichia coli  Susceptibility testing done on previous specimen     01/12/2018 (A)  Final    Cultured on the 1st day of incubation:  Enterococcus faecalis     01/12/2018   Final    Critical Value/Significant Value, preliminary result only, called to and read back by  Shorty Gardner RN 7B @ 0836.cg 01/13/18      01/12/2018   Final    (Note)  POSITIVE for ENTEROCOCCUS FAECALIS and NEGATIVE for Melvin/vanB genes  by Verigene multiplex nucleic acid test. Final identification and  antimicrobial susceptibility testing will be verified by standard  methods.    Specimen tested with Verigene multiplex, gram-positive blood culture  nucleic acid test for the following targets: Staph aureus, Staph  epidermidis, Staph lugdunensis, other Staph species, Enterococcus  faecalis, Enterococcus faecium, Streptococcus species, S. agalactiae,  S. anginosus grp., S. pneumoniae, S. pyogenes, Listeria sp., mecA  (methicillin resistance) and Melvin/B (vancomycin resistance).    Critical Value/Significant Value called to and read back by Shorty Gardner RN 7B @ 1129.cg 01/13/18 01/12/2018 No growth  Final   01/12/2018 No growth  Final   01/12/2018 (A)  Final    Cultured on the 1st day of incubation:  Escherichia coli     01/12/2018   Final    Critical Value/Significant Value, preliminary result only, called to and read back by  Dr Jeet Orozco  in the [facility] at 8:20am 1/12/2018 ()     01/12/2018   Final    (Note)  POSITIVE for E.COLI by Verigene multiplex nucleic acid test. Final  identification and antimicrobial susceptibility testing will be  verified by standard methods. Verigene test will not distinguish  E.coli from Shigella species including S.dysenteriae, S.flexneri,  S.boydii, and S.sonnei. Specimens containing Shigella species or  E.coli will be reported as Positive for E.coli.    Specimen tested with Verigene multiplex, gram-negative blood culture  nucleic acid test for the following targets: Acinetobacter sp.,  Citrobacter sp., Enterobacter sp., Proteus sp., E. coli, K.  pneumoniae/oxytoca, P. aeruginosa, and the following resistance  markers: CTXM, KPC, NDM, VIM, IMP and OXA.    Critical Value/Significant Value called to and read back by Dr. Jeet Orozco at 1042 on 1.12.18.KD     09/24/2017 No growth  Final   09/24/2017 No growth  Final    09/23/2017 No growth  Final   09/23/2017 No growth  Final   09/22/2017 No growth  Final   09/22/2017 No growth  Final   09/22/2017 No growth  Final   09/21/2017 No growth  Final   09/21/2017 (A)  Final    Cultured on the 1st day of incubation:  Streptococcus mitis group  Identification obtained by MALDI-TOF mass spectrometry research use only database. Test   characteristics determined and verified by the [facility]   ([facility]) [place], [place].     09/21/2017   Final    Critical Value/Significant Value, preliminary result only, called to and read back by  Nathaly Yo RN on 9/22/2017 @2011, tk         Urine Studies    Recent Labs   Lab Test 01/04/19  0249 10/10/18  0100 09/11/18  2345 06/01/18  2021 01/12/18  0042 09/19/17  0242   LEUKEST Negative Negative Negative Negative Negative Negative   WBCU 1 1  --  <1 <1 2       Vancomycin Levels    Recent Labs   Lab Test 06/14/18  2100 06/11/18  1330 06/08/18  1420   VANCOMYCIN 20.4 18.2 12.9     Hepatitis B Testing   Recent Labs   Lab Test 09/20/17  0549   HEPBANG Nonreactive     Hepatitis C Testing     Hepatitis C Antibody   Date Value Ref Range Status   09/20/2017 Nonreactive NR^Nonreactive Final     Comment:     Assay performance characteristics have not been established for newborns,   infants, and children

## 2019-01-04 NOTE — TELEPHONE ENCOUNTER
Writer called back [phone] and patient's son picked up. Writer told patient's son she was returning Rose's call from earlier about scheduling an appointment.  Writer let patient's son know that Rose or patient can call the clinic to discuss open time slots in the future for patient.    Carmencita Guo RN

## 2019-01-04 NOTE — LETTER
Transition Communication Hand-off for Care Transitions to Next Level of Care Provider    Name: Parker Acevedo Sr.  : 1970  MRN #: [id number]  Primary Care Provider: Chuy Isaac     Primary Clinic: [address] [place] [id number]     Reason for Hospitalization:  Candidiasis [B37.9]  Admit Date/Time: 2019  2:06 AM  Discharge Date: 19  Payor Source: Payor: BCBS / Plan: BCBS MEDICARE ADVANTAGE / Product Type: Medicare /          Reason for Communication Hand-off Referral: Other Continuity of care    Discharge Plan:  Home with resumption of home TPN and oral antibiotics.       Concern for non-adherence with plan of care:  Y    Already enrolled in Tele-monitoring program and name of program: N  Follow-up specialty is recommended: Yes    Follow-up plan:    Future Appointments   Date Time Provider Department Center   2019  1:00 PM Patti Milligan MD [facility]       Any outstanding tests or procedures:        Referrals     Future Labs/Procedures    Home care nursing referral     Comments:    [phone].    [facility] coordinating home RN support/services    RN skilled nursing visit. RN to assess vital signs and weight, respiratory and cardiac status, pain level and activity tolerance, hydration, nutrition and bowel status and home safety.  RN to teach administration of IV medications and medication management.  RN to provide line care per agency protocol and lab draws.    Your provider has ordered home care nursing services. If you have not been contacted within 2 days of your discharge please call the inpatient department phone number at [phone] .    Home infusion referral     Comments:    Your provider has referred you to: [facility]: [facility] [place] [phone]   [email or web address]    Local Address (if different from home address): N/A    Chronic TPN, supplies    Home nursing through: [facility] [phone].      Home Infusion Pharmacist to adjust therapy based on labs and clinical assessments: Yes    Labs:  May draw labs from Venous Catheter: Yes  Home Infusion Pharmacist to order labs based on therapy type and clinical assessments: Yes  Call/Fax Lab Results to: Primary care provider    Agency Staff to assess nursing needs for Infusion Therapy.    Access Device Management:  IV Access Type: PICC  Flush with Heparin and Normal Saline IVP PRN and routine site care (per agency protocol) to maintain access device? Yes    Medication Therapy Management Referral     Comments:    MTM referral reason            Patient had a hospital or ED visit in last 6 months and has more than 10   PTA or Discharge medications       This service is designed to help you get the most from your medications.  A specially trained pharmacist will work closely with you and your doctors  to solve any problems related to your medications and to help you get the   best results from taking them.      The Medication Therapy Management staff will call you to schedule an appointment.            Key Recommendations:  Please refer to AVS.  Patient discharging on oral antibiotics.      Jyothi Rivas    AVS/Discharge Summary is the source of truth; this is a helpful guide for improved communication of patient story

## 2019-01-05 LAB
ALBUMIN SERPL-MCNC: 3 G/DL (ref 3.4–5)
ALP SERPL-CCNC: 71 U/L (ref 40–150)
ALT SERPL W P-5'-P-CCNC: 26 U/L (ref 0–70)
ANION GAP SERPL CALCULATED.3IONS-SCNC: 7 MMOL/L (ref 3–14)
AST SERPL W P-5'-P-CCNC: 20 U/L (ref 0–45)
BACTERIA SPEC CULT: NORMAL
BILIRUB DIRECT SERPL-MCNC: <0.1 MG/DL (ref 0–0.2)
BILIRUB SERPL-MCNC: 0.4 MG/DL (ref 0.2–1.3)
BUN SERPL-MCNC: 11 MG/DL (ref 7–30)
CALCIUM SERPL-MCNC: 8.1 MG/DL (ref 8.5–10.1)
CHLORIDE SERPL-SCNC: 107 MMOL/L (ref 94–109)
CO2 SERPL-SCNC: 25 MMOL/L (ref 20–32)
CREAT SERPL-MCNC: 0.64 MG/DL (ref 0.66–1.25)
ERYTHROCYTE [DISTWIDTH] IN BLOOD BY AUTOMATED COUNT: 13.6 % (ref 10–15)
GFR SERPL CREATININE-BSD FRML MDRD: >90 ML/MIN/{1.73_M2}
GLUCOSE BLDC GLUCOMTR-MCNC: 110 MG/DL (ref 70–99)
GLUCOSE BLDC GLUCOMTR-MCNC: 132 MG/DL (ref 70–99)
GLUCOSE BLDC GLUCOMTR-MCNC: 138 MG/DL (ref 70–99)
GLUCOSE BLDC GLUCOMTR-MCNC: 94 MG/DL (ref 70–99)
GLUCOSE SERPL-MCNC: 118 MG/DL (ref 70–99)
HCT VFR BLD AUTO: 39.2 % (ref 40–53)
HGB BLD-MCNC: 12.4 G/DL (ref 13.3–17.7)
INR PPP: 1.18 (ref 0.86–1.14)
Lab: NORMAL
MAGNESIUM SERPL-MCNC: 1.8 MG/DL (ref 1.6–2.3)
MCH RBC QN AUTO: 30.9 PG (ref 26.5–33)
MCHC RBC AUTO-ENTMCNC: 31.6 G/DL (ref 31.5–36.5)
MCV RBC AUTO: 98 FL (ref 78–100)
PHOSPHATE SERPL-MCNC: 3 MG/DL (ref 2.5–4.5)
PLATELET # BLD AUTO: 115 10E9/L (ref 150–450)
POTASSIUM SERPL-SCNC: 3.6 MMOL/L (ref 3.4–5.3)
PROT SERPL-MCNC: 6.6 G/DL (ref 6.8–8.8)
RBC # BLD AUTO: 4.01 10E12/L (ref 4.4–5.9)
SODIUM SERPL-SCNC: 138 MMOL/L (ref 133–144)
SPECIMEN SOURCE: NORMAL
WBC # BLD AUTO: 5.6 10E9/L (ref 4–11)

## 2019-01-05 PROCEDURE — 83970 ASSAY OF PARATHORMONE: CPT | Performed by: HOSPITALIST

## 2019-01-05 PROCEDURE — 40000556 ZZH STATISTIC PERIPHERAL IV START W US GUIDANCE

## 2019-01-05 PROCEDURE — 00000146 ZZHCL STATISTIC GLUCOSE BY METER IP

## 2019-01-05 PROCEDURE — 84100 ASSAY OF PHOSPHORUS: CPT | Performed by: HOSPITALIST

## 2019-01-05 PROCEDURE — 83735 ASSAY OF MAGNESIUM: CPT | Performed by: HOSPITALIST

## 2019-01-05 PROCEDURE — 85610 PROTHROMBIN TIME: CPT | Performed by: HOSPITALIST

## 2019-01-05 PROCEDURE — 25000128 H RX IP 250 OP 636: Performed by: STUDENT IN AN ORGANIZED HEALTH CARE EDUCATION/TRAINING PROGRAM

## 2019-01-05 PROCEDURE — 85027 COMPLETE CBC AUTOMATED: CPT | Performed by: HOSPITALIST

## 2019-01-05 PROCEDURE — 25000128 H RX IP 250 OP 636: Performed by: NURSE PRACTITIONER

## 2019-01-05 PROCEDURE — 25000132 ZZH RX MED GY IP 250 OP 250 PS 637: Performed by: HOSPITALIST

## 2019-01-05 PROCEDURE — 25000132 ZZH RX MED GY IP 250 OP 250 PS 637: Performed by: STUDENT IN AN ORGANIZED HEALTH CARE EDUCATION/TRAINING PROGRAM

## 2019-01-05 PROCEDURE — 25000128 H RX IP 250 OP 636: Performed by: HOSPITALIST

## 2019-01-05 PROCEDURE — 25000125 ZZHC RX 250: Performed by: HOSPITALIST

## 2019-01-05 PROCEDURE — 36415 COLL VENOUS BLD VENIPUNCTURE: CPT | Performed by: HOSPITALIST

## 2019-01-05 PROCEDURE — 84134 ASSAY OF PREALBUMIN: CPT | Performed by: HOSPITALIST

## 2019-01-05 PROCEDURE — 12000001 ZZH R&B MED SURG/OB UMMC

## 2019-01-05 PROCEDURE — 82248 BILIRUBIN DIRECT: CPT | Performed by: HOSPITALIST

## 2019-01-05 PROCEDURE — 80053 COMPREHEN METABOLIC PANEL: CPT | Performed by: HOSPITALIST

## 2019-01-05 PROCEDURE — 99232 SBSQ HOSP IP/OBS MODERATE 35: CPT | Performed by: HOSPITALIST

## 2019-01-05 PROCEDURE — 25000131 ZZH RX MED GY IP 250 OP 636 PS 637: Performed by: STUDENT IN AN ORGANIZED HEALTH CARE EDUCATION/TRAINING PROGRAM

## 2019-01-05 RX ORDER — LORAZEPAM 1 MG/1
2 TABLET ORAL AT BEDTIME
Status: DISCONTINUED | OUTPATIENT
Start: 2019-01-05 | End: 2019-01-09 | Stop reason: HOSPADM

## 2019-01-05 RX ORDER — FLUCONAZOLE 2 MG/ML
400 INJECTION, SOLUTION INTRAVENOUS EVERY 24 HOURS
Status: DISCONTINUED | OUTPATIENT
Start: 2019-01-05 | End: 2019-01-08

## 2019-01-05 RX ADMIN — ASCORBIC ACID, VITAMIN A PALMITATE, CHOLECALCIFEROL, THIAMINE HYDROCHLORIDE, RIBOFLAVIN-5 PHOSPHATE SODIUM, PYRIDOXINE HYDROCHLORIDE, NIACINAMIDE, DEXPANTHENOL, ALPHA-TOCOPHEROL ACETATE, VITAMIN K1, FOLIC ACID, BIOTIN, CYANOCOBALAMIN: 200; 3300; 200; 6; 3.6; 6; 40; 15; 10; 150; 600; 60; 5 INJECTION, SOLUTION INTRAVENOUS at 16:10

## 2019-01-05 RX ADMIN — Medication 10 MEQ: at 07:52

## 2019-01-05 RX ADMIN — ONDANSETRON 8 MG: 4 TABLET, ORALLY DISINTEGRATING ORAL at 19:00

## 2019-01-05 RX ADMIN — MAGNESIUM SULFATE HEPTAHYDRATE 2 G: 40 INJECTION, SOLUTION INTRAVENOUS at 20:05

## 2019-01-05 RX ADMIN — FLUCONAZOLE, SODIUM CHLORIDE 400 MG: 2 INJECTION INTRAVENOUS at 21:37

## 2019-01-05 RX ADMIN — OXYCODONE HYDROCHLORIDE 15 MG: 5 SOLUTION ORAL at 01:52

## 2019-01-05 RX ADMIN — LORAZEPAM 2 MG: 1 TABLET ORAL at 23:16

## 2019-01-05 RX ADMIN — LORAZEPAM 1 MG: 1 TABLET ORAL at 00:14

## 2019-01-05 RX ADMIN — OXYCODONE HYDROCHLORIDE 15 MG: 5 SOLUTION ORAL at 05:56

## 2019-01-05 RX ADMIN — ONDANSETRON 8 MG: 4 TABLET, ORALLY DISINTEGRATING ORAL at 10:19

## 2019-01-05 RX ADMIN — ACETAMINOPHEN 650 MG: 325 SOLUTION ORAL at 10:11

## 2019-01-05 RX ADMIN — OXYCODONE HYDROCHLORIDE 15 MG: 5 SOLUTION ORAL at 14:24

## 2019-01-05 RX ADMIN — OXYCODONE HYDROCHLORIDE 15 MG: 5 SOLUTION ORAL at 10:11

## 2019-01-05 RX ADMIN — MICAFUNGIN SODIUM 100 MG: 10 INJECTION, POWDER, LYOPHILIZED, FOR SOLUTION INTRAVENOUS at 03:01

## 2019-01-05 RX ADMIN — SUCRALFATE 1 G: 1 SUSPENSION ORAL at 14:24

## 2019-01-05 RX ADMIN — Medication 10 MEQ: at 10:12

## 2019-01-05 RX ADMIN — OXYCODONE HYDROCHLORIDE 15 MG: 5 SOLUTION ORAL at 19:00

## 2019-01-05 RX ADMIN — ACETAMINOPHEN 650 MG: 325 SOLUTION ORAL at 23:16

## 2019-01-05 RX ADMIN — OXYCODONE HYDROCHLORIDE 15 MG: 5 SOLUTION ORAL at 23:16

## 2019-01-05 RX ADMIN — SUCRALFATE 1 G: 1 SUSPENSION ORAL at 20:33

## 2019-01-05 RX ADMIN — ONDANSETRON 8 MG: 4 TABLET, ORALLY DISINTEGRATING ORAL at 01:55

## 2019-01-05 RX ADMIN — ACETAMINOPHEN 650 MG: 325 SOLUTION ORAL at 05:58

## 2019-01-05 RX ADMIN — DEXTROAMPHETAMINE SACCHARATE, AMPHETAMINE ASPARTATE, DEXTROAMPHETAMINE SULFATE AND AMPHETAMINE SULFATE 20 MG: 2.5; 2.5; 2.5; 2.5 TABLET ORAL at 07:46

## 2019-01-05 ASSESSMENT — ACTIVITIES OF DAILY LIVING (ADL)
ADLS_ACUITY_SCORE: 11

## 2019-01-05 ASSESSMENT — MIFFLIN-ST. JEOR: SCORE: 1807.27

## 2019-01-05 NOTE — PROGRESS NOTES
[facility] Infectious Disease - General   Progress Note     Patient:  Parker Acevedo Sr.   Date of birth 1970, Medical record number [id number]  Date of Visit:  01/05/2019  Date of Admission: 1/4/2019       Assessment and Recommendations:     ASSESSMENT:  - Fungemia  - Chronic Malnutrition with TPN dependence via prior RUE PICC  - Celestino-en-Y Gastric Bypass  - Short Gut Syndrome    47yo M with PMH of Celestino-en-Y gastric bypass, esophagojejunostomy c/b short gut syndrome, and chronic malnutrition on TPN with recurrent bacteremia and is admitted for fevers, chills, and blood culture x1 positive for yeast from home health care blood draw from PICC line. Fungemia is likely related to PICC line as he has a previous history of recurrent port/PICC line infections. PICC line removed on 1/4.     Fungemia is new for him as he has not had a history of fungemia in the last 12 months. He denies any acute vision changes, but an Ophthalmology consult would be recommended to evaluate for endophthalmitis. Additionally, an ECHO should be obtained as initial evaluation for endocarditis in the setting of fevers/chills for 2 weeks. Empirically treat with micafungin until further speciation and sensitivities are available.     RECOMMENDATIONS:  - continue micafungin  - follow up TTE, ophtho cconsult  - follow up pending blood cultures    Patient was seen and discussed with the ID - General Attending, Dr. Chantale Mendez.    Carly Orozco MD  Fellow, Adult and Pediatric Infectious Disease  pager: [phone]    History of Present Illness:   Tmax 102 deg F overnight. Feeling 5% better this morning. Rash is not progressing. No new symptoms.    Review of Systems:   A complete 10 point review of systems was performed, and is as noted in the history above and otherwise negative.    Antimicrobials:  Micafungin (1/4-present)    Physical Examination:   Vitals were reviewed    Ranges for  vital signs:  Temp:  [99  F (37.2  C)-102.1  F (38.9  C)] 100.5  F (38.1  C)  Pulse:  [86-99] 86  Resp:  [16] 16  BP: (124-134)/(63-89) 124/63  SpO2:  [95 %-97 %] 95 %    GENERAL:  well-developed, well-nourished, in bed in no acute distress.   HEENT:  head is normocephalic, atraumatic, eyes non-icteric, moist mucous membranes  LUNGS:  clear to auscultation bilaterally, no increased work of breathing   CARDIOVASCULAR: regular rate and rhythm with no murmurs, rubs or gallops.  ABDOMEN:  normoactive bowel sounds  NEUROLOGIC: no focal deficits  EXTREMITIES: no clubbing, cyanosis, or edema  SKIN:  Scattered hyperpigmented papules with surrounding erythema on upper extremities, forehead, and lower abdomen - unchanged    Laboratory Data:     Microbiology:         Culture Micro   Date Value Ref Range Status   01/04/2019 Culture negative monitoring continues  Preliminary   01/04/2019   Final    <10,000 colonies/mL  urogenital deepa  Susceptibility testing not routinely done     01/04/2019 Cultured on the 1st day of incubation:  Yeast   (A)  Preliminary   01/04/2019   Preliminary    Critical Value/Significant Value, preliminary result only, called to and read back by   PERLA KIM RN @1452 1/4/19. CT     01/04/2019 No growth after 1 day  Preliminary   01/02/2019 Cultured on the 3rd day of incubation:  Yeast   (A)  Preliminary   01/02/2019   Preliminary    Critical Value/Significant Value, preliminary result only, called to and read back by  Perla Kim RN on 1.4.19 at 1649. bw     01/02/2019 Cultured on the 1st day of incubation:  Yeast   (A)  Preliminary   01/02/2019   Preliminary    Critical Value/Significant Value, preliminary result only, called to and read back by  DAVID CRUZ RN 2231 1.3.19 NDP     01/02/2019 Culture in progress  Preliminary   12/24/2018 No growth  Final   12/24/2018 No growth  Final   11/08/2018 No growth  Final   11/08/2018 No growth  Final   10/20/2018 No growth  Final   10/20/2018 No  growth  Final   10/13/2018 (A)  Final    Cultured on the 4th day of incubation:  Achromobacter (Alcaligenes) denitrificans     10/13/2018   Final    Critical Value/Significant Value, preliminary result only, called to and read back by  Dr. Chantale Mendez @2145 on 10/18/18. ch.     10/13/2018 Susceptibility testing done on previous specimen  Final   10/13/2018 (A)  Final    Cultured on the 2nd day of incubation:  Achromobacter (Alcaligenes) denitrificans     10/13/2018   Final    Critical Value/Significant Value, preliminary result only, called to and read back by  Rigo Dickerson MD at 0848 10.16.18.     10/13/2018   Final    (Note)  NEGATIVE for the following organisms and resistance markers:  Acinetobacter sp., Citrobacter sp., Enterobacter sp., Proteus sp., E.  coli, K. pneumoniae/oxytoca, P. aeruginosa, CTX-M, KPC, NDM, VIM, IMP  and OXA by [employer] multiplex nucleic acid test. Final identification  and antimicrobial susceptibility testing will be verified by standard  methods.    Critical Value/Significant Value called to and read back by ;Dr. Rigo Dickerson [id number] @1117   10/16/2018 gd       10/13/2018 No growth  Final   10/13/2018 No growth  Final   10/13/2018 No growth  Final   10/12/2018 (A)  Final    Cultured on the 1st day of incubation:  Achromobacter (Alcaligenes) denitrificans  Susceptibility testing done on previous specimen     10/12/2018   Final    Critical Value/Significant Value, preliminary result only, called to and read back by  Jael Lanza at 0829 on 10/13/18 ac.     10/12/2018 No growth  Final   10/12/2018 No growth  Final   10/11/2018 (A)  Final    Cultured on the 1st day of incubation:  Achromobacter (Alcaligenes) denitrificans     10/11/2018   Final    Critical Value/Significant Value, preliminary result only, called to and read back by  Pinky Dobson RN UU5A on 10.12.18 at 1157 RD.      10/11/2018 No growth  Final   10/10/2018 No growth  Final   10/10/2018 (A)  Final    Cultured on the 1st  day of incubation:  Streptococcus salivarius  Susceptibility testing done on previous specimen     10/10/2018   Final    Critical Value/Significant Value, preliminary result only, called to and read back by  Tanya NEIL on 10.10.18 at 1028. RD.      10/10/2018 (A)  Final    Cultured on the 1st day of incubation:  Achromobacter (Alcaligenes) denitrificans  Susceptibility testing done on previous specimen     10/08/2018 (A)  Final    Cultured on the 1st day of incubation:  Streptococcus salivarius     10/08/2018   Final    Critical Value/Significant Value, preliminary result only, called to and read back by  Dr. Daly on 10.9.18 at 0800. bw     10/08/2018 (A)  Final    Cultured on the 1st day of incubation:  Achromobacter (Alcaligenes) denitrificans     10/08/2018   Final    Paged Dr. Esteban Daly x2 on 10/10/18 to notify of second organism.  No call back   10/08/2018   Final    Susceptibility testing requested by  Chantale Garza on Achromobacter for Ceftriaxone.10/12/18 at 1122.TV.     10/08/2018   Final    (Note)  NEGATIVE for the following: Staphylococcus spp., Staph aureus, Staph  epidermidis, Staph lugdunensis, Streptococcus spp., Strep pneumoniae,  Strep pyogenes, Strep agalactiae, Strep anginosus group, Enterococcus  faecalis, Enterococcus faecium, and Listeria spp. by [employer]  multiplex nucleic acid test. Final identification and antimicrobial  susceptibility testing will be verified by standard methods.    Critical Value/Significant Value called to and read back by Nafisa Escalante RN ([facility]) at 1025 on 10.9.18 RD.        10/08/2018 (A)  Final    Cultured on the 1st day of incubation:  Staphylococcus hominis  This isolate DOES NOT demonstrate inducible clindamycin resistance in vitro. Clindamycin   is susceptible and could be used when indicated, however, erythromycin is resistant and   should not be used.     10/08/2018   Final    Critical Value/Significant Value, preliminary result  only, called to and read back by  Nafisa Escalante RN on 10.9.18 at 1407. bw     10/08/2018 (A)  Final    Cultured on the 2nd day of incubation:  Gram positive bacilli resembling diphtheroids  No further identification     10/08/2018   Final    Critical Value/Significant Value, preliminary result only, called to and read back by  Jessica NEIL on 10.10.18 at 1347. RD.     10/08/2018   Final    (Note)  POSITIVE for Staphylococci other than S.aureus, S.epidermidis and  S.lugdunensis, by [employer]igene multiplex nucleic acid test.  Coagulase-negative staphylococci are the most common venipuncture or  collection associated skin CONTAMINANTS grown in blood cultures.  Final identification and antimicrobial susceptibility testing will be  verified by standard methods.    Specimen tested with Verigene multiplex, gram-positive blood culture  nucleic acid test for the following targets: Staph aureus, Staph  epidermidis, Staph lugdunensis, other Staph species, Enterococcus  faecalis, Enterococcus faecium, Streptococcus species, S. agalactiae,  S. anginosus grp., S. pneumoniae, S. pyogenes, Listeria sp., mecA  (methicillin resistance) and Melvin/B (vancomycin resistance).    Critical Value/Significant Value called to and read back by Nafisa Aponte RN @ 7994 10/9/18 CS      NEGATIVE for the following: Staphylococcus spp., Staph aureus, Staph  epidermidis, Staph lugdunensis, Streptococcus spp., Strep pneumoniae,  Strep pyogenes, Strep agalactiae, Strep anginosus group, Enterococcus  faecalis, Enterococcus faecium, and Listeria spp. by Verigene  multiplex nucleic acid test. Final identification and antimicrobial  susceptibility testing will be verified by standard methods.    Critical Value/Significant Value called to and read back by Hany Tyler RN, @1058 10/10/18.DH.     06/28/2018 No growth  Final   06/28/2018 No growth  Final   06/21/2018 No growth  Final   06/21/2018 No growth  Final   06/04/2018 No growth  Final    06/04/2018 No growth  Final   06/04/2018 No growth  Final   06/02/2018 Canceled, Test credited  Final   06/02/2018 Canceled via EPIC interface  Final   06/02/2018 Canceled, Test credited  Final   06/02/2018 Canceled via EPIC interface  Final   06/01/2018 No growth  Final   06/01/2018 (A)  Final    Cultured on the 1st day of incubation:  Enterococcus faecalis     06/01/2018   Final    Critical Value/Significant Value, preliminary result only, called to and read back by  ROGER MARTIN RN U5A 0455 06.02.18 CF     06/01/2018 Susceptibility testing done on previous specimen  Final   05/31/2018 (A)  Final    Cultured on the 1st day of incubation:  Enterococcus faecalis  Susceptibility testing done on previous specimen     05/31/2018   Final    Critical Value/Significant Value, preliminary result only, called to and read back by  Savannah Solitario RN from [facility]. 6.1.18 at 0525. GR.     05/31/2018 (A)  Final    Cultured on the 1st day of incubation:  Staphylococcus epidermidis     05/31/2018   Final    Critical Value/Significant Value called to and read back by  Maday Tan RN 5A at 1215 on 6/4/18 by ALMA.     05/31/2018 (A)  Final    Cultured on the 1st day of incubation:  Enterococcus faecalis     05/31/2018   Final    Critical Value/Significant Value, preliminary result only, called to and read back by  Savannah Jarrett, on call RN for [facility]. 6.1.18 at 0237. GR.      05/31/2018 (A)  Final    Cultured on the 1st day of incubation:  Staphylococcus haemolyticus     05/31/2018   Final    Critical Value/Significant Value called to and read back by  Maday Tan RN at 1515 on 6/4/18 by ALMA.     05/31/2018   Final    (Note)  POSITIVE for ENTEROCOCCUS FAECALIS and NEGATIVE for Melvin/vanB genes  by [employer] multiplex nucleic acid test. Final identification and  antimicrobial susceptibility testing will be verified by standard  methods.    Specimen tested with Verigene multiplex, gram-positive blood culture  nucleic acid  test for the following targets: Staph aureus, Staph  epidermidis, Staph lugdunensis, other Staph species, Enterococcus  faecalis, Enterococcus faecium, Streptococcus species, S. agalactiae,  S. anginosus grp., S. pneumoniae, S. pyogenes, Listeria sp., mecA  (methicillin resistance) and Melvin/B (vancomycin resistance).    Critical Value/Significant Value called to and read back by Savannah Jarrett RN from [facility]. 6.1.18 at 0521. GR.     01/15/2018 <15 colonies   Escherichia coli   (A)  Final   01/14/2018 No growth  Final   01/14/2018 No growth  Final   01/13/2018 (A)  Final    Cultured on the 1st day of incubation:  Enterococcus faecalis  Susceptibility testing done on previous specimen     01/13/2018 (A)  Final    Upon further review of the original slide, no gram negative rods are seen  Previously reported as:  Cultured on the 1st day of incubation:  Gram negative rods  CORRECTED ON:  1.20.18 @1407 AV     01/13/2018   Final    Critical Value/Significant Value, preliminary result only, called to and read back by  Taylor Bhatt RN from [facility]. 1.14.18 at 0111. GR.     01/13/2018   Final    Corrected report called to and read back by  DENNIS Dooley 1.20.18 @1410 AV     01/13/2018 (A)  Final    Cultured on the 1st day of incubation:  Enterococcus faecalis  Susceptibility testing done on previous specimen     01/13/2018   Final    Critical Value/Significant Value, preliminary result only, called to and read back by  Shorty Montgomery RN at 1953 on 01.13.18 by mp     01/13/2018 (A)  Final    Cultured on the 1st day of incubation:  Staphylococcus hominis     01/13/2018   Final    Critical Value/Significant Value called to and read back by  Kasia Jones RN on 1.16.2018 at 1114. KVO     01/12/2018 (A)  Final    Cultured on the 1st day of incubation:  Escherichia coli  Susceptibility testing done on previous specimen     01/12/2018 (A)  Final    Cultured on the 1st day of incubation:  Enterococcus faecalis     01/12/2018   Final     Critical Value/Significant Value, preliminary result only, called to and read back by  Shorty Gardner RN 7B @ 0836.cg 01/13/18 01/12/2018   Final    (Note)  POSITIVE for ENTEROCOCCUS FAECALIS and NEGATIVE for Melvin/vanB genes  by Verigene multiplex nucleic acid test. Final identification and  antimicrobial susceptibility testing will be verified by standard  methods.    Specimen tested with Verigene multiplex, gram-positive blood culture  nucleic acid test for the following targets: Staph aureus, Staph  epidermidis, Staph lugdunensis, other Staph species, Enterococcus  faecalis, Enterococcus faecium, Streptococcus species, S. agalactiae,  S. anginosus grp., S. pneumoniae, S. pyogenes, Listeria sp., mecA  (methicillin resistance) and Melvin/B (vancomycin resistance).    Critical Value/Significant Value called to and read back by Shorty Gardner RN 7B @ 1129.cg 01/13/18 01/12/2018 No growth  Final   01/12/2018 No growth  Final   01/12/2018 (A)  Final    Cultured on the 1st day of incubation:  Escherichia coli     01/12/2018   Final    Critical Value/Significant Value, preliminary result only, called to and read back by  Dr Jeet Orozco  in the [facility] at 8:20am 1/12/2018 ()     01/12/2018   Final    (Note)  POSITIVE for E.COLI by Verigene multiplex nucleic acid test. Final  identification and antimicrobial susceptibility testing will be  verified by standard methods. Verigene test will not distinguish  E.coli from Shigella species including S.dysenteriae, S.flexneri,  S.boydii, and S.sonnei. Specimens containing Shigella species or  E.coli will be reported as Positive for E.coli.    Specimen tested with Verigene multiplex, gram-negative blood culture  nucleic acid test for the following targets: Acinetobacter sp.,  Citrobacter sp., Enterobacter sp., Proteus sp., E. coli, K.  pneumoniae/oxytoca, P. aeruginosa, and the following resistance  markers: CTXM, KPC, NDM, VIM, IMP and OXA.    Critical Value/Significant  Value called to and read back by Dr. Jeet Orozco at 1042 on 1.12.18.KD     09/24/2017 No growth  Final   09/24/2017 No growth  Final   09/23/2017 No growth  Final   09/23/2017 No growth  Final   09/22/2017 No growth  Final   09/22/2017 No growth  Final   09/22/2017 No growth  Final   09/21/2017 No growth  Final   09/21/2017 (A)  Final    Cultured on the 1st day of incubation:  Streptococcus mitis group  Identification obtained by MALDI-TOF mass spectrometry research use only database. Test   characteristics determined and verified by the [facility]   ([facility]) [place], [place].     09/21/2017   Final    Critical Value/Significant Value, preliminary result only, called to and read back by  Nathaly Yo RN on 9/22/2017 @2011, tk             Other Laboratory Data:    Urine Studies    Recent Labs   Lab Test 01/04/19  0249 10/10/18  0100 09/11/18  2345 06/01/18  2021 01/12/18  0042 09/19/17  0242   LEUKEST Negative Negative Negative Negative Negative Negative   WBCU 1 1  --  <1 <1 2       Inflammatory Markers    Recent Labs   Lab Test 08/20/18  1442 06/28/18  1400 06/21/18  1148 06/02/18  0609 06/01/18  1807 02/16/18  1515 02/12/18  1400 01/23/18  0845 01/20/18  1030  09/24/17  1900  06/28/17  2222 03/12/17  0351  11/01/16  1530  04/20/16  1530 04/11/16  1842   SED  --   --   --   --   --   --   --  10 11  --  31*  --  26* 17*  --  27*  --  34* 11   CRP <2.9 <2.9 <2.9 30.0* 26.0* 8.2* <2.9 <2.9 5.4   < > 26.6*   < > 53.0* 3.4   < > 8.4*   < > 12.3* 80.0*    < > = values in this interval not displayed.       Hematology Studies    Recent Labs   Lab Test 01/05/19  0516 01/04/19  0543 01/04/19  0249 01/02/19  1548 12/18/18  1407 12/03/18  1445 11/19/18  1400 11/08/18  1532  10/15/18  1450   WBC 5.6 4.5 5.2 6.8 8.0 9.6 9.5 7.4   < > 6.8   ANEU  --   --  3.3 3.5 4.3 5.1 7.0 4.1  --  3.0   AEOS  --   --  0.1 0.2  --  0.3 0.1 0.2  --  0.4   HGB 12.4* 12.1* 13.8 12.4* 13.0* 12.4* 12.2* 13.1*   < >  12.2*   MCV 98 100 98 98 99 100 98 99   < > 97   * 123* 134* 89* 150 154 139* 106*   < > 175    < > = values in this interval not displayed.       Metabolic Studies     Recent Labs   Lab Test 01/05/19  0516 01/04/19  1649 01/04/19  0543 01/04/19  0249 01/02/19  1548 12/18/18  1407     --  141 140 140 142   POTASSIUM 3.6 3.5 3.1* 3.7 3.8 4.4   CHLORIDE 107  --  107 104 105 108   CO2 25  --  28 29 30 26   BUN 11  --  11 13 14 12   CR 0.64*  --  0.71 0.85 0.72 0.60*   GFRESTIMATED >90  --  >90 >90 >90 >90       Hepatic Studies    Recent Labs   Lab Test 01/05/19  0516 01/04/19  0249 01/02/19  1548 12/18/18  1407 12/03/18  1445 11/19/18  1400   BILITOTAL 0.4 0.4 0.5 0.8 0.4 0.8   ALKPHOS 71 80 73 81 76 80   ALBUMIN 3.0* 3.7 3.5 3.7 3.5 3.6   AST 20 16 20 18 20 18   ALT 26 26 22 27 21 19       Thyroid Studies    Recent Labs   Lab Test 11/03/14  1355 05/20/14  1252   TSH 0.96 1.39       Vancomycin Levels    Recent Labs   Lab Test 06/14/18  2100 06/11/18  1330 06/08/18  1420   VANCOMYCIN 20.4 18.2 12.9       Virology:  Hepatitis B Testing   Recent Labs   Lab Test 09/20/17  0549   HEPBANG Nonreactive     Hepatitis C Testing     Hepatitis C Antibody   Date Value Ref Range Status   09/20/2017 Nonreactive NR^Nonreactive Final     Comment:     Assay performance characteristics have not been established for newborns,   infants, and children       Imaging:  No new results.

## 2019-01-05 NOTE — PROGRESS NOTES
OPHTHALMOLOGY PROGRESS NOTE  01/05/19    Patient: Parker Acevedo Sr.    HISTORY OF PRESENTING ILLNESS:     Parker Acevedo Sr. is a 48 year old male with PMH of gastric bypass, esophagojejunostromy, and chronic malnutrition (on TPN) and recurrent bacteremia who presented with fevers, chills, and (+) blood cultures for yeast on routine draw from picc line. Started on micafungin.     No visual complaints.       Review of systems were otherwise negative except for that which has been stated above.    Interval Update (1/5/19)  - No changes in vision, no flashes/floaters    OCULAR/MEDICAL/SURGICAL HISTORIES:     Past Ocular History:  No intraocular or eyelid surgery  No eye trauma  Occasionally wears glasses       EXAMINATION:     Base Eye Exam     Neuro/Psych     Oriented x3:  Yes    Mood/Affect:  Normal          Dilation     Both eyes:  1.0% Mydriacyl, 1.0% Cyclogyl @ 5:38 PM            Slit Lamp and Fundus Exam     External Exam       Right Left    External Normal Normal          Slit Lamp Exam       Right Left    Lids/Lashes Normal Normal    Conjunctiva/Sclera White and quiet White and quiet    Cornea Clear Clear    Anterior Chamber Deep and quiet Deep and quiet    Iris Round and reactive, then dilated Round and reactive, then dilated    Lens Clear Clear    Vitreous Normal - no haze Normal - no haze          Fundus Exam       Right Left    Disc Normal, some inferior PPA vs disc drusen Normal    C/D Ratio 0.3 0.3    Macula Stable small yellow lesion (not fluffy)  just off the superior arcade; newly noticed today (1/5/19) are similar lesions in inferior periphery - not fluffy Stable bright yellow exudate/lesion just temporal to the macula; not white, not fluffy     Vessels Normal Normal    Periphery Normal Normal                Labs/Studies/Imaging Performed  Prelim blood cultures: yeast      ASSESSMENT/PLAN:     Parker Acevedo Sr. is a 48 year old male who presents with fungemia likely secondary  to yeast/candida. No vision complaints per patient. VA intact. SLE normal. Dilated exam notable for 2 small yellow lesions near the macula, 1 in each eye. Those lesions are stable on today's exam (1/5/19). In addition, new lesions noted inferiorly in the right eye that were not appreciated yesterday, and are not fluffy in appearance. Will have anti-fungal switched to an azole and have him follow-up in retina clinic on Monday. Will plan to follow-up with repeat dilated exam in 24-36 hrs. Continue with systemic anti-fungal per primary team.     Fungal chorioretinitis, both eyes  - Blood cultures from 1/2/19 with yeast  - No visual concerns per patient   - 2 small lesions appreciated on initial dilated exam, with more lesions appreciated in right eye today  - Please change anti-fungal to an azole with good ocular penetrance (preferably fluconazole)  - Will have patient seen in Retina Clinic on Monday 1/7 with optos photos  - Contact immediately if new vision symptoms arise     Patient examined with Dr. Cade. Discussed case with Dr. Giang.     It is our pleasure to participate in this patient's care and treatment. Please contact us with any further questions or concerns.      Dennis Pickering MD  Ophthalmology Resident  PGY-2

## 2019-01-05 NOTE — CONSULTS
Bariatric Surgery Consult Note    Staff: Dr. Mercado  Date: 1/5/2019   Consulted for: possible conversion of g tube to gj tube by Dr. Alvarez    Assessment/Plan:  48 year old male who underwent a RNYGB in 2001 which has been complicated by marginal ulcer, short gut, chronic abdominal pain and malnutrition requiring 2 revisions and decompression of remnant stomach with gastrostomy tube. I discussed the possibility of converting to a GJ tube with the goal of feeding enterally. We went over the ongoing risks of bloodstream infections with the PICC/TPN and the benefits of enteral feed. He is not interested in attempting enteral nutrition at this time due the pain and bloating he experiences.  - Recommend bariatric lab panel to assess vitamin and nutritional status  - Keep regular follow up with Dr. Vyas in [facility]  - Contact Bariatric Surgery again if patient changes his mind regarding GJ tube  - Please call with questions    Discussed with staff    Xena Fuchs MD  General Surgery, PGY-3  Pg [phone]    ------------------------------------------  HPI:   Parker Richflorentino Sr. is a 48 year old male admitted with fungemia related to TPN via PICC who is being evaluated for conversion of gastrostomy tube to gastrojejunostomy tube for enteral feeding. Parker underwent a RNYGB in 2001 which has been complicated by marginal ulcer, short gut, chronic abdominal pain and malnutrition requiring 2 revisions and decompression of remnant stomach with gastrostomy tube. Most recently in 2013, Dr. Vyas performed an ex lap, SUZI and partial gastrectomy with re-siting of gastrostomy tube for chronic abdominal pain and obstructive symptoms. He has since be dependent on TPN for nutrition as he is unable to take adequate PO and has not tolerated tube feeds due to bloating, nausea/emesis, and pain. Parker is only able to tolerate about 1.5 bananas/day. He vents using his g-tube for GI symptoms at least once a day.  "TPN use has been complicated by multiple line infections associated with the PICC. He is currently admitted with fungemia and PICC has been removed. He endorses fevers, chills, general malaise. He has not had any change in his abdominal pain but has noted that he needs to vent his g-tube more often since he generally has not been feeling well.     The potential for a gj tube has been discussed previously with Parker in Jan 2018. He did not want to pursue it at that time. Patient states that he \"would rather die with the PICC\". A SBFT was obtained 1 year ago with PO contrast, no distal obstruction or stricture was seen.     Review of Systems:  ROS: 10 point ROS neg other than the symptoms noted above in the HPI.    PMH:  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)      Chronic abdominal pain      Complication of anesthesia      Difficulty swallowing      Gastric ulcer, unspecified as acute or chronic, without mention of hemorrhage, perforation, or obstruction      Gastro-oesophageal reflux disease      Generalized weakness 1/30/2018     Head injury      Hiatal hernia      Other bladder disorder      Other chronic pain      PONV (postoperative nausea and vomiting)      Severe malnutrition (H)     TPN     Short gut syndrome      Tobacco abuse         PSHx:  Past Surgical History:   Procedure Laterality Date     AMPUTATION       APPENDECTOMY       BACK SURGERY  11/3/2014    curve in the spine     BIOPSY LYMPH NODE CERVICAL N/A 2/20/2015    Procedure: BIOPSY LYMPH NODE CERVICAL;  Surgeon: Baron Scanlon MD;  Location: [facility]     C GASTRIC BYPASS,OBESE<100CM SHAYLEE-EN-Y  2002    lost 300 pounds     CHOLECYSTECTOMY       DISCECTOMY, FUSION CERVICAL ANTERIOR ONE LEVEL, COMBINED N/A 2/15/2017    Procedure: COMBINED " DISCECTOMY, FUSION CERVICAL ANTERIOR ONE LEVEL;  Surgeon: Darren Campos MD;  Location: [facility]     ENDOSCOPIC INSERTION TUBE GASTROSTOMY  9/9/2013    Procedure: ENDOSCOPIC INSERTION TUBE GASTROSTOMY;;  Surgeon: Francis Vyas MD;  [facility]     ENDOSCOPIC ULTRASOUND UPPER GASTROINTESTINAL TRACT (GI)  4/29/2011    Procedure:ENDOSCOPIC ULTRASOUND UPPER GASTROINTESTINAL TRACT (GI); Both Procedures done Conjointly; Surgeon:NEREIDA HOUSER; [facility]     ENDOSCOPIC ULTRASOUND UPPER GASTROINTESTINAL TRACT (GI)  9/9/2013    Procedure: ENDOSCOPIC ULTRASOUND UPPER GASTROINTESTINAL TRACT (GI);  Endoscopic Ultrasound Guide Gastrostomy Tube Placement  C-arm;  Surgeon: Noe Lizarraga MD;  [facility]     ENDOSCOPY  03/25/11    EGD, [facility]     ENDOSCOPY  08/04/09    Upper Endoscopy, [facility]     ENDOSCOPY  01/05/09    Upper Endoscopy, [facility]     ESOPHAGOSCOPY, GASTROSCOPY, DUODENOSCOPY (EGD), COMBINED  4/20/2011    Procedure:COMBINED ESOPHAGOSCOPY, GASTROSCOPY, DUODENOSCOPY (EGD); Surgeon:FRANCIS VYAS; [facility]     ESOPHAGOSCOPY, GASTROSCOPY, DUODENOSCOPY (EGD), COMBINED  6/15/2011    Procedure:COMBINED ESOPHAGOSCOPY, GASTROSCOPY, DUODENOSCOPY (EGD); Surgeon:FRANCIS VYAS; [facility]     ESOPHAGOSCOPY, GASTROSCOPY, DUODENOSCOPY (EGD), COMBINED  6/12/2013    Procedure: COMBINED ESOPHAGOSCOPY, GASTROSCOPY, DUODENOSCOPY (EGD);;  Surgeon: Francis Vyas MD;  [facility]     ESOPHAGOSCOPY, GASTROSCOPY, DUODENOSCOPY (EGD), COMBINED  11/22/2013    Procedure: COMBINED ESOPHAGOSCOPY, GASTROSCOPY, DUODENOSCOPY (EGD);;  Surgeon: Francis Vyas MD;  [facility]     ESOPHAGOSCOPY, GASTROSCOPY, DUODENOSCOPY (EGD), COMBINED  4/30/2014    Procedure: COMBINED ESOPHAGOSCOPY, GASTROSCOPY, DUODENOSCOPY (EGD);  Surgeon: Francis Vyas MD;  [facility]     ESOPHAGOSCOPY, GASTROSCOPY, DUODENOSCOPY (EGD), COMBINED N/A 2/20/2015     Procedure: COMBINED ESOPHAGOSCOPY, GASTROSCOPY, DUODENOSCOPY (EGD), BIOPSY SINGLE OR MULTIPLE;  Surgeon: Baron Scanlon MD;  Location:  OR     ESOPHAGOSCOPY, GASTROSCOPY, DUODENOSCOPY (EGD), COMBINED N/A 9/30/2015    Procedure: COMBINED ESOPHAGOSCOPY, GASTROSCOPY, DUODENOSCOPY (EGD);  Surgeon: Francis Vyas MD;  Location:  GI     GASTRECTOMY  6/22/2012    Procedure: GASTRECTOMY;  Open Approach, Excise Ulcers,Partial Gastrectomy, Esophagojejunostomy, Hiatal Hernia Repair, Extensive Lysis of Adhesions and Esaphagogastrodudenoscopy.;  Surgeon: Francis Vyas MD;  Location:  OR     GASTROJEJUNOSTOMY  08/26/09    Extensice enterolysis, partial resect. jejunum, part. resect gastric pouch, gastrojejunostomy anastomosis     HC ESOPH/GAS REFLUX TEST W NASAL IMPED ELECTRODE  8/5/2013    Procedure: ESOPHAGEAL IMPEDENCE FUNCTION TEST 1 HOUR OR LESS;  Surgeon: Halie Lang MD;  Location:  GI     HEAD & NECK SURGERY  2/15/2017    C5-C6     HERNIA REPAIR  2006    Umbilical hernia     HERNIORRHAPHY HIATAL  6/22/2012    Procedure: HERNIORRHAPHY HIATAL;;  Surgeon: Francis Vyas MD;  Location:  OR     HERNIORRHAPHY INGUINAL  11/22/2013    Procedure: HERNIORRHAPHY INGUINAL;;  Surgeon: Francis Vyas MD;  [facility]     INSERT PORT VASCULAR ACCESS Right 12/19/2017    Procedure: INSERT PORT VASCULAR ACCESS;  Right Chest Port Placement ;  Surgeon: Lisandro Alejandro PA-C;  Location:  OR     INSERT PORT VASCULAR ACCESS Right 8/2/2018    Procedure: INSERT PORT VASCULAR ACCESS;  Place single lumen tunneled central venous access catheter;  Surgeon: uGy Jamil PA-C;  Location:  OR     LAPAROTOMY EXPLORATORY  11/22/2013    Procedure: LAPAROTOMY EXPLORATORY;  Exploratory Laparotomy, Upper Endoscopy, Left Inguinal Hernia Repair;  Surgeon: Francis Vyas MD;  Location:  OR     ORTHOPEDIC SURGERY       PICC INSERTION Right 03/16/2017    5fr DL BioFlo PICC,  42cm (3cm external) in the R medial brachial vein w/ tip in the SVC RA junction.     PICC INSERTION Left 09/23/2017    5fr DL BioFlo PICC, 45cm (1cm external) in the L basilic vein w/ tip in the SVC RA junction.     SHAYLEE EN Y BOWEL  2003     SOFT TISSUE SURGERY       SOFT TISSUE SURGERY       THORACIC SURGERY       TONSILLECTOMY          Medications:    No current facility-administered medications on file prior to encounter.   Current Outpatient Medications on File Prior to Encounter:  acetaminophen (TYLENOL) 32 mg/mL liquid Take 500 mg by mouth every 4 hours as needed for fever or mild pain   albuterol (PROAIR HFA/PROVENTIL HFA/VENTOLIN HFA) 108 (90 Base) MCG/ACT Inhaler Inhale 2 puffs into the lungs every 4 hours as needed for shortness of breath / dyspnea or wheezing   amphetamine-dextroamphetamine (ADDERALL) 20 MG tablet Take 1 tablet (20 mg) by mouth daily   carvedilol (COREG) 6.25 MG tablet Take 6.25 mg by mouth 2 times daily (with meals)   cyanocobalamin (VITAMIN B12) 1000 MCG/ML injection Inject 1 mL (1,000 mcg) into the muscle every 30 days   lidocaine (LIDODERM) 5 % Patch Place 1-2 patches onto the skin every 24 hours Wear for 12 hours, remove for 12 hours.  OK to cut to better fit to size.   LORazepam (ATIVAN) 1 MG tablet 1-2 mg as needed for anxiety with TPN and medications   ondansetron (ZOFRAN-ODT) 8 MG ODT tab Take 1 tablet (8 mg) by mouth every 8 hours as needed for nausea   oxyCODONE (ROXICODONE) 5 MG/5ML solution Take 10-15 mLs (10-15 mg) by mouth every 4 hours as needed for moderate to severe pain Max of 60 mg/day. Fill on/after 12/27/18 not to start untill 12/29/18.   parenteral nutrition - PTA/DISCHARGE ORDER TPN+lipid Formula per [facility] 10/10/18: volume 1800 mL; AA 95g/d; Dex 235g/d; Intralipid 20% 350 mL; sodium 30 mEq/d; potassium 100 mEq/d; magnesium 16 mEq/d; phosphate 50 mMol/d; infuvite adult 10 mL/d; MTE-5 3mL/d; Cl:Ace 1:2; Infuse intravenously over 14 hours Monday-Friday.Plain TPN Formula  "per [facility] 10/10/18: volume 2150 mL; all other ingredients identical to lipid formula. Infuse intravenously over 14 hours Saturday and Sunday.   sodium chloride 0.9% infusion Inject 1,000-2,000 mLs into the vein as needed    sucralfate (CARAFATE) 1 GM/10ML suspension Take 10 mLs (1 g) by mouth 4 times daily (Patient taking differently: Take 1 g by mouth every 4 hours as needed (EPIGASTRIC PAIN, BILE BACKUP) )   vitamin D (ERGOCALCIFEROL) 83200 UNIT capsule TAKE 1 CAPSULE BY MOUTH EVERY 7 DAYS   [facility] INFUSION MANAGED PATIENT Contact [facility] for patient specific medication information at [phone] on admission and discharge from the hospital.  Phones are answered 24 hours a day 7 days a week 365 days a year.Providers - Choose \"CONTINUE HOME MED (no script)\" at discharge if patient treatment with home infusion will continue.   Needle, Disp, (BD DISP NEEDLES) 27G X 1/2\" MISC 1 Device every 30 days Use for cyanocobalamin injection once q 30 days.   order for DME Equipment being ordered: Bilateral knee high chronic venous insufficiency stockings--  mild-moderate pressures.   order for DME Injection Supplies for Vitamin B12: 3cc syringes w/ 27 gauge needles, 1/2 inch length   [DISCONTINUED] NO ACTIVE MEDICATIONS .       Allergies:   Bactrim [sulfamethoxazole w/trimethoprim]; Penicillins; Doxycycline; and Vancomycin     SocHx:  He is here today with his wife  Current smoker    FamHx:  Negative for bleeding disorders, clotting disorders, or problems with anesthesia    Physical Examination   /63 (BP Location: Left arm)   Pulse 86   Temp 100.5  F (38.1  C) (Oral)   Resp 16   Ht 1.816 m (5' 11.5\")   Wt 92.7 kg (204 lb 4.8 oz)   SpO2 95%   BMI 28.10 kg/m    General: Awake and alert. NAD  Pulm: Non-labored breathing  CV: RRR  Abdomen: soft, mildly TTP in the epigastrium, non-distended. G-tube in LUQ appears clean and dry, no surrounding erythema. Well healed surgical " scars  Musculoskel/Extremities: no edema  Skin: no rashes, no diaphoresis and skin color normal     Labs: Reviewed   Cr 0.64  Albumin 3.0  WBC 5.6  Hgb 12.4  Plt 115  INR 1.18  Mg 1.8  LFTs wnl    Imaging: Reviewed  No new imaging

## 2019-01-05 NOTE — PROGRESS NOTES
Previous RN stopped PPN per pt request about 10 min ago. Writer went in with new PPN bag to hang, but pt refused to allow writer to hang PPN.    Educated the pt on risk of BG dropping too low.     Pt still refused to allow writer to reconnect PPN, despite education on risks.

## 2019-01-05 NOTE — PROGRESS NOTES
[facility], [facility] Progress Note - Hospitalist Service, Gold Jef       Date of Admission:  1/4/2019  Assessment & Plan      Parker Acevedo Sr. is a 48 year old male admitted on 1/4/2019. He has a history of Celestion-en-Y gastric bypass, esophagojejunostromy, and chronic malnutrition on TPN with recurrent bacteriemia and is admitted for fevers, chills, and blood cultures positive for yeast.    # Positive blood cultures, growing yeast   Patient comes to the ED after blood cultures taken at home came back positive for yeast. He has been having fevers, chills, diaphoresis, myalgia, rash, and weakness since December 25, 2018. In the ED, patient was started on micafungin and repeat blood cultures were drawn. He has a PICC through which he receives TPN; this is his most likely source of infection. TPN is a major risk factor for candidiasis. He did not have a leukocytosis. He has a PICC in place, but in the past he has had multiple ports.   - Continue micafungin 100 mg daily  - Appreciate ID recs  - ECHO w no endocarditis  - Optho eval within normal limits  - Removed PICC    # Chronic Malnutrition on TPN  Patient only able to take small bites of food. Otherwise he has severe nausea and vomiting.   - Cannot do TPN as his PICC is now out, and cannot place any central access until his fungemia resolves  - He also has had many fungal and bacterial infections of the central line  - So, I recommened that we switch to GJ and pursue TF, however, patient stated he was told this is not a possibility for him.   - Per my chart review, he was last seen by  in Jan 2018, at that time there was a plan to change his G to GJ and patient refused  - Will discuss with them in AM  - Meanwhile, TF po  - Ondansetron for nausea/ vomiting      # Anxiety   # Attention Deficit Hyperactivity Disorder   Takes lorazepam before bed every night   - Continue PTA lorazepam  - Continue PTA  amphetamine-dextroamphetamine (Adderall)     # Chronic Pain  Patient has chronic abdominal pain  - Continue PTAoxycodone     # History of Cardiomyopathy  - Currently holding PTA carvedilol in the setting of blood stream infection and risk for sepsis.     Diet: Room Service  Regular Diet Adult  parenteral nutrition - Clinimix E    DVT Prophylaxis: Low Risk/Ambulatory with no VTE prophylaxis indicated  Sanchez Catheter: not present  Code Status: Full Code      Disposition Plan   Expected discharge: 2 - 3 days, recommended to prior living arrangement once adequate pain management/ tolerating PO medications.  Entered: Mike Alvarez MD 01/04/2019, 11:42 PM       The patient's care was discussed with the Bedside Nurse.    Mike Alvarez MD  Hospitalist Service, [address]  Please see sticky note for cross cover information  ______________________________________________________________________    Interval History   Reports chronic abdominal pain at baseline  Has HA with his fevers  Has runny nose with productive cough for the past 3 days  Diarrhea 1 wk ago, now better  At baseline,He has reflux and cough when he lies flat, so he states that he has to sleep sitting up      Physical Exam   Vital Signs: Temp: 102  F (38.9  C) Temp src: Oral BP: 127/82 Pulse: 97   Resp: 16 SpO2: 97 % O2 Device: None (Room air)    Weight: 204 lbs 4.8 oz  General Appearance: Alert, well appearing, and in no acute distress  Mental Status: Oriented to person, place, and time  HEENT: No pallor or icterus. Pupils equal and reactive, extraocular eye movements intact. Mucous membranes moist, pharynx normal without lesions. Neck supple, no significant adenopathy, or thyromegaly  Chest: Clear to auscultation bilaterally, no wheezes, rales or rhonchi or crackels, symmetric air entry  Heart: Normal S1, S2. No murmurs, rubs, clicks or gallops. Normal rate, regular rhythm  Abdomen:  Soft, nontender, nondistended, no masses or organomegaly, Bowel sounds heard, G-tube in place  Neurological: Alert, oriented, normal speech, no acute focal findings  Skin and Extremities: Peripheral pulses normal, no pedal edema, no clubbing or cyanosis. No rashes- diffuse macular lesions on upper arms, chest and back, no suspicious skin lesions noted

## 2019-01-06 ENCOUNTER — APPOINTMENT (OUTPATIENT)
Dept: MRI IMAGING | Facility: CLINIC | Age: 49
DRG: 314 | End: 2019-01-06
Attending: OPHTHALMOLOGY
Payer: COMMERCIAL

## 2019-01-06 LAB
ANION GAP SERPL CALCULATED.3IONS-SCNC: 6 MMOL/L (ref 3–14)
BUN SERPL-MCNC: 12 MG/DL (ref 7–30)
CALCIUM SERPL-MCNC: 8.1 MG/DL (ref 8.5–10.1)
CHLORIDE SERPL-SCNC: 109 MMOL/L (ref 94–109)
CO2 SERPL-SCNC: 26 MMOL/L (ref 20–32)
CREAT SERPL-MCNC: 0.65 MG/DL (ref 0.66–1.25)
ERYTHROCYTE [DISTWIDTH] IN BLOOD BY AUTOMATED COUNT: 13.9 % (ref 10–15)
FERRITIN SERPL-MCNC: 201 NG/ML (ref 26–388)
GFR SERPL CREATININE-BSD FRML MDRD: >90 ML/MIN/{1.73_M2}
GLUCOSE BLDC GLUCOMTR-MCNC: 102 MG/DL (ref 70–99)
GLUCOSE BLDC GLUCOMTR-MCNC: 111 MG/DL (ref 70–99)
GLUCOSE BLDC GLUCOMTR-MCNC: 148 MG/DL (ref 70–99)
GLUCOSE SERPL-MCNC: 100 MG/DL (ref 70–99)
HCT VFR BLD AUTO: 38.8 % (ref 40–53)
HGB BLD-MCNC: 12.3 G/DL (ref 13.3–17.7)
MAGNESIUM SERPL-MCNC: 2.1 MG/DL (ref 1.6–2.3)
MAGNESIUM SERPL-MCNC: 2.4 MG/DL (ref 1.6–2.3)
MCH RBC QN AUTO: 31.3 PG (ref 26.5–33)
MCHC RBC AUTO-ENTMCNC: 31.7 G/DL (ref 31.5–36.5)
MCV RBC AUTO: 99 FL (ref 78–100)
PHOSPHATE SERPL-MCNC: 3.1 MG/DL (ref 2.5–4.5)
PHOSPHATE SERPL-MCNC: 3.8 MG/DL (ref 2.5–4.5)
PLATELET # BLD AUTO: 123 10E9/L (ref 150–450)
POTASSIUM SERPL-SCNC: 3.8 MMOL/L (ref 3.4–5.3)
POTASSIUM SERPL-SCNC: 3.8 MMOL/L (ref 3.4–5.3)
PTH-INTACT SERPL-MCNC: 55 PG/ML (ref 18–80)
RBC # BLD AUTO: 3.93 10E12/L (ref 4.4–5.9)
SODIUM SERPL-SCNC: 140 MMOL/L (ref 133–144)
VIT B12 SERPL-MCNC: 517 PG/ML (ref 193–986)
WBC # BLD AUTO: 5.7 10E9/L (ref 4–11)

## 2019-01-06 PROCEDURE — 25000132 ZZH RX MED GY IP 250 OP 250 PS 637: Performed by: STUDENT IN AN ORGANIZED HEALTH CARE EDUCATION/TRAINING PROGRAM

## 2019-01-06 PROCEDURE — 36415 COLL VENOUS BLD VENIPUNCTURE: CPT | Performed by: STUDENT IN AN ORGANIZED HEALTH CARE EDUCATION/TRAINING PROGRAM

## 2019-01-06 PROCEDURE — 25500064 ZZH RX 255 OP 636: Performed by: RADIOLOGY

## 2019-01-06 PROCEDURE — 82306 VITAMIN D 25 HYDROXY: CPT | Performed by: STUDENT IN AN ORGANIZED HEALTH CARE EDUCATION/TRAINING PROGRAM

## 2019-01-06 PROCEDURE — A9585 GADOBUTROL INJECTION: HCPCS | Performed by: RADIOLOGY

## 2019-01-06 PROCEDURE — 12000001 ZZH R&B MED SURG/OB UMMC

## 2019-01-06 PROCEDURE — 85027 COMPLETE CBC AUTOMATED: CPT | Performed by: STUDENT IN AN ORGANIZED HEALTH CARE EDUCATION/TRAINING PROGRAM

## 2019-01-06 PROCEDURE — 84590 ASSAY OF VITAMIN A: CPT | Performed by: STUDENT IN AN ORGANIZED HEALTH CARE EDUCATION/TRAINING PROGRAM

## 2019-01-06 PROCEDURE — 83735 ASSAY OF MAGNESIUM: CPT | Performed by: STUDENT IN AN ORGANIZED HEALTH CARE EDUCATION/TRAINING PROGRAM

## 2019-01-06 PROCEDURE — 25000128 H RX IP 250 OP 636: Performed by: NURSE PRACTITIONER

## 2019-01-06 PROCEDURE — 00000146 ZZHCL STATISTIC GLUCOSE BY METER IP

## 2019-01-06 PROCEDURE — 25000132 ZZH RX MED GY IP 250 OP 250 PS 637: Performed by: HOSPITALIST

## 2019-01-06 PROCEDURE — 82607 VITAMIN B-12: CPT | Performed by: STUDENT IN AN ORGANIZED HEALTH CARE EDUCATION/TRAINING PROGRAM

## 2019-01-06 PROCEDURE — 36415 COLL VENOUS BLD VENIPUNCTURE: CPT | Performed by: HOSPITALIST

## 2019-01-06 PROCEDURE — 72158 MRI LUMBAR SPINE W/O & W/DYE: CPT

## 2019-01-06 PROCEDURE — 82728 ASSAY OF FERRITIN: CPT | Performed by: STUDENT IN AN ORGANIZED HEALTH CARE EDUCATION/TRAINING PROGRAM

## 2019-01-06 PROCEDURE — 25000128 H RX IP 250 OP 636: Performed by: HOSPITALIST

## 2019-01-06 PROCEDURE — 99232 SBSQ HOSP IP/OBS MODERATE 35: CPT | Performed by: HOSPITALIST

## 2019-01-06 PROCEDURE — 25000125 ZZHC RX 250: Performed by: HOSPITALIST

## 2019-01-06 PROCEDURE — 87040 BLOOD CULTURE FOR BACTERIA: CPT | Performed by: HOSPITALIST

## 2019-01-06 PROCEDURE — 80048 BASIC METABOLIC PNL TOTAL CA: CPT | Performed by: HOSPITALIST

## 2019-01-06 PROCEDURE — 84425 ASSAY OF VITAMIN B-1: CPT | Performed by: HOSPITALIST

## 2019-01-06 PROCEDURE — 25000131 ZZH RX MED GY IP 250 OP 636 PS 637: Performed by: STUDENT IN AN ORGANIZED HEALTH CARE EDUCATION/TRAINING PROGRAM

## 2019-01-06 PROCEDURE — 84100 ASSAY OF PHOSPHORUS: CPT | Performed by: STUDENT IN AN ORGANIZED HEALTH CARE EDUCATION/TRAINING PROGRAM

## 2019-01-06 PROCEDURE — 84630 ASSAY OF ZINC: CPT | Performed by: STUDENT IN AN ORGANIZED HEALTH CARE EDUCATION/TRAINING PROGRAM

## 2019-01-06 RX ORDER — ERGOCALCIFEROL 1.25 MG/1
50000 CAPSULE, LIQUID FILLED ORAL
Status: DISCONTINUED | OUTPATIENT
Start: 2019-01-06 | End: 2019-01-09 | Stop reason: HOSPADM

## 2019-01-06 RX ORDER — GADOBUTROL 604.72 MG/ML
0.1 INJECTION INTRAVENOUS ONCE
Status: COMPLETED | OUTPATIENT
Start: 2019-01-06 | End: 2019-01-06

## 2019-01-06 RX ADMIN — ONDANSETRON 8 MG: 4 TABLET, ORALLY DISINTEGRATING ORAL at 03:05

## 2019-01-06 RX ADMIN — OXYCODONE HYDROCHLORIDE 15 MG: 5 SOLUTION ORAL at 16:32

## 2019-01-06 RX ADMIN — Medication 10 MEQ: at 13:31

## 2019-01-06 RX ADMIN — ACETAMINOPHEN 650 MG: 325 SOLUTION ORAL at 08:17

## 2019-01-06 RX ADMIN — LORAZEPAM 2 MG: 1 TABLET ORAL at 22:18

## 2019-01-06 RX ADMIN — ONDANSETRON 8 MG: 4 TABLET, ORALLY DISINTEGRATING ORAL at 12:09

## 2019-01-06 RX ADMIN — OXYCODONE HYDROCHLORIDE 15 MG: 5 SOLUTION ORAL at 03:05

## 2019-01-06 RX ADMIN — ACETAMINOPHEN 650 MG: 325 SOLUTION ORAL at 22:18

## 2019-01-06 RX ADMIN — OXYCODONE HYDROCHLORIDE 15 MG: 5 SOLUTION ORAL at 08:18

## 2019-01-06 RX ADMIN — ACETAMINOPHEN 650 MG: 325 SOLUTION ORAL at 16:32

## 2019-01-06 RX ADMIN — ONDANSETRON 8 MG: 4 TABLET, ORALLY DISINTEGRATING ORAL at 22:18

## 2019-01-06 RX ADMIN — OXYCODONE HYDROCHLORIDE 15 MG: 5 SOLUTION ORAL at 22:18

## 2019-01-06 RX ADMIN — DEXTROAMPHETAMINE SACCHARATE, AMPHETAMINE ASPARTATE, DEXTROAMPHETAMINE SULFATE AND AMPHETAMINE SULFATE 20 MG: 2.5; 2.5; 2.5; 2.5 TABLET ORAL at 08:18

## 2019-01-06 RX ADMIN — ERGOCALCIFEROL 50000 UNITS: 1.25 CAPSULE, LIQUID FILLED ORAL at 20:01

## 2019-01-06 RX ADMIN — ASCORBIC ACID, VITAMIN A PALMITATE, CHOLECALCIFEROL, THIAMINE HYDROCHLORIDE, RIBOFLAVIN-5 PHOSPHATE SODIUM, PYRIDOXINE HYDROCHLORIDE, NIACINAMIDE, DEXPANTHENOL, ALPHA-TOCOPHEROL ACETATE, VITAMIN K1, FOLIC ACID, BIOTIN, CYANOCOBALAMIN: 200; 3300; 200; 6; 3.6; 6; 40; 15; 10; 150; 600; 60; 5 INJECTION, SOLUTION INTRAVENOUS at 13:31

## 2019-01-06 RX ADMIN — GADOBUTROL 8.9 ML: 604.72 INJECTION INTRAVENOUS at 15:07

## 2019-01-06 RX ADMIN — OXYCODONE HYDROCHLORIDE 15 MG: 5 SOLUTION ORAL at 12:08

## 2019-01-06 RX ADMIN — FLUCONAZOLE, SODIUM CHLORIDE 400 MG: 2 INJECTION INTRAVENOUS at 20:01

## 2019-01-06 RX ADMIN — Medication 10 MEQ: at 12:08

## 2019-01-06 RX ADMIN — ACETAMINOPHEN 650 MG: 325 SOLUTION ORAL at 12:08

## 2019-01-06 ASSESSMENT — ACTIVITIES OF DAILY LIVING (ADL)
ADLS_ACUITY_SCORE: 11

## 2019-01-06 ASSESSMENT — MIFFLIN-ST. JEOR: SCORE: 1798.2

## 2019-01-06 NOTE — PROVIDER NOTIFICATION
Paged MD at pager [phone] LOW: [phone] [phone] [place] [phone] Day RN said MD it was okay to have Peripheral parenteral nutrition off for a few days since pt IV infiltrated. please call RN to confirm this okay.

## 2019-01-06 NOTE — PROVIDER NOTIFICATION
"Writer programmed PPN rate of 110ml/hr into IV pump and it reads \"HI\" rate. Writer verified PPN rate of 110ml/h with pharmacy and it is the correct rate. It is similar to NS with a few extra vitamins per pharmacy.   "

## 2019-01-06 NOTE — PROGRESS NOTES
[facility], [facility] Progress Note - Hospitalist Service, Gold 2       Date of Admission:  1/4/2019  Assessment & Plan      Parker Acevedo Sr. is a 48 year old male admitted on 1/4/2019. He has a history of Celestino-en-Y gastric bypass, esophagojejunostromy, and chronic malnutrition on TPN with recurrent bacteriemia and is admitted for fevers, chills, and blood cultures positive for yeast.    # Positive blood cultures, growing yeast   Patient comes to the ED after blood cultures taken at home came back positive for yeast. He has been having fevers, chills, diaphoresis, myalgia, rash, and weakness since December 25, 2018. In the ED, patient was started on micafungin and repeat blood cultures were drawn. He has a PICC through which he receives TPN; this is his most likely source of infection. TPN is a major risk factor for candidiasis. He did not have a leukocytosis. He has a PICC in place, but in the past he has had multiple ports.   - Continue micafungin 100 mg daily  - Appreciate ID recs  - ECHO w no endocarditis  - Optho eval within normal limits  - Removed PICC  - Ophthalmology to see him again today    # Chronic Malnutrition on TPN  Patient only able to take small bites of food. Otherwise he has severe nausea and vomiting.   - Seen by bariatric service, ordered bariatric lab panel. Patient does not wish to do GJ tube  - I and  from ID, had a very prolonged disucssion with patient and wife about the dangers of TPN +Central access  -Patient encountered by saying that he used to get infections with G-tube that were much more severe than his bloodstream infections.  -We explained that G-tube infections are typically bacterial, will take some time before the bacterial infection travels into the blood and becomes a bloodstream infection. However, if it is a central line access with the TPN, high risk of recurrent fungal infections, we talked about how bloodstream  "infections are more dangerous.  -However, patient stated that he is very fortunate in that he knows his body very well and can report immediately if he has any bloodstream infection  -We discussed with him that by the time he gets symptoms, it may be too late. I in particular explained the risks of endocarditis, septic emboli causing stroke and debilitation, endophtlamitis causing blindness, renal infarctions from sepsis, or morbidity and mortality.  - However, patient stated that he has a lot of trouble from using the tube feeds which cause him bloating and interfered with his quality of life.  He stated that he read extensively on the Internet about his condition, bloodstream infections and \"I weighed the options\" and prefers to take the risk of bloodstream infection.  He states that he knows he might die from it.  However he is willing to take that risk as he had \"vowed to myself\"  that he will never go back to a G-tube.  -Meanwhile, continue peripheral nutrition & PO  -We will need central line placed for TPN once cultures cleared  - Ondansetron for nausea/ vomiting      # Anxiety   # Attention Deficit Hyperactivity Disorder   Takes lorazepam before bed every night   - Continue PTA lorazepam  - Continue PTA amphetamine-dextroamphetamine (Adderall)     # Chronic Pain  Patient has chronic abdominal pain  - Continue PTAoxycodone     # History of Cardiomyopathy  - Currently holding PTA carvedilol in the setting of blood stream infection and risk for sepsis.     Diet: Room Service  Regular Diet Adult  parenteral nutrition - Clinimix E    DVT Prophylaxis: Low Risk/Ambulatory with no VTE prophylaxis indicated  Sanchez Catheter: not present  Code Status: Full Code      Disposition Plan   Expected discharge: 2 - 3 days, recommended to prior living arrangement once adequate pain management/ tolerating PO medications.  Entered: Mike Alvarez MD 01/05/2019, 10:28 PM       The patient's care was discussed with " the Bedside Nurse.    Mike Alvarez MD  Hospitalist Service, [address]  Please see sticky note for cross cover information  ______________________________________________________________________    Interval History   Reports chronic abdominal pain at baseline  Feels much better today, cough improved  No other symptoms/complaints at this time      Physical Exam   Vital Signs: Temp: 99.6  F (37.6  C) Temp src: Oral BP: 127/76 Pulse: 67 Heart Rate: 78 Resp: 18 SpO2: 99 % O2 Device: None (Room air)    Weight: 197 lbs 12.8 oz  General Appearance: Alert, well appearing, and in no acute distress  Mental Status: Oriented to person, place, and time  HEENT: No pallor or icterus. Pupils equal and reactive, extraocular eye movements intact. Mucous membranes moist, pharynx normal without lesions. Neck supple, no significant adenopathy, or thyromegaly  Chest: Clear to auscultation bilaterally, no wheezes, rales or rhonchi or crackels, symmetric air entry  Heart: Normal S1, S2. No murmurs, rubs, clicks or gallops. Normal rate, regular rhythm  Abdomen: Soft, nontender, nondistended, no masses or organomegaly, Bowel sounds heard, G-tube in place  Neurological: Alert, oriented, normal speech, no acute focal findings  Skin and Extremities: Peripheral pulses normal, no pedal edema, no clubbing or cyanosis. No rashes- rash improved compared to 1/4

## 2019-01-06 NOTE — PROGRESS NOTES
[facility] Progress Note - Hospitalist Service, Gold 2       Date of Admission:  1/4/2019  Assessment & Plan      Parker Acevedo Sr. is a 48 year old male admitted on 1/4/2019. He has a history of Celestino-en-Y gastric bypass, esophagojejunostromy, and chronic malnutrition on TPN with recurrent bacteriemia and is admitted for fevers, chills, and blood cultures positive for yeast.    # Positive blood cultures, growing yeast   Patient comes to the ED after blood cultures taken at home came back positive for yeast. He has been having fevers, chills, diaphoresis, myalgia, rash, and weakness since December 25, 2018. In the ED, patient was started on micafungin and repeat blood cultures were drawn. He has a PICC through which he receives TPN; this is his most likely source of infection. TPN is a major risk factor for candidiasis. He did not have a leukocytosis. He has a PICC in place, but in the past he has had multiple ports.   - Switched to 400mg IV Fluconazole  - Appreciate ID recs  - ECHO w no endocarditis  - Optho eval with worsening lesions on 1/5, plan to see him on 1/7 Monday  - Removed PICC    # Chronic Malnutrition on TPN  Patient only able to take small bites of food. Otherwise he has severe nausea and vomiting.   - Seen by bariatric service, ordered bariatric lab panel. Patient does not wish to do GJ tube  -Meanwhile, continue peripheral nutrition & PO  - However, pt today refused PPN. Ok to stay off until central access is placed if patient refuses  -We will need central line placed for TPN once cultures cleared- Rpt cultures drawn on 1/6  - Ondansetron for nausea/ vomiting      # Anxiety   # Attention Deficit Hyperactivity Disorder   Takes lorazepam before bed every night   - Continue PTA lorazepam  - Continue PTA amphetamine-dextroamphetamine (Adderall)     # Chronic Pain  Patient has chronic abdominal pain  - Continue PTAoxycodone     # History of  Cardiomyopathy  - Currently holding PTA carvedilol in the setting of blood stream infection and risk for sepsis.     Diet: Room Service  Regular Diet Adult  parenteral nutrition - Clinimix E    DVT Prophylaxis: Low Risk/Ambulatory with no VTE prophylaxis indicated  Sanchez Catheter: not present  Code Status: Full Code      Disposition Plan   Expected discharge: 2 - 3 days, recommended to prior living arrangement once adequate pain management/ tolerating PO medications.  Entered: Mike Alvarez MD 01/06/2019, 5:14 PM       The patient's care was discussed with the Bedside Nurse.    Mike Alvarez MD  Hospitalist Service, [address]  Please see sticky note for cross cover information  ______________________________________________________________________    Interval History   Reports chronic abdominal pain at baseline  Feels much better today, cough improved  No other symptoms/complaints at this time      Physical Exam   Vital Signs: Temp: 99  F (37.2  C) Temp src: Oral BP: 133/84 Pulse: 74 Heart Rate: 75 Resp: 16 SpO2: 98 % O2 Device: None (Room air)    Weight: 195 lbs 12.8 oz  General Appearance: Alert, well appearing, and in no acute distress  Mental Status: Oriented to person, place, and time  HEENT: No pallor or icterus. Pupils equal and reactive, extraocular eye movements intact. Mucous membranes moist, pharynx normal without lesions. Neck supple, no significant adenopathy, or thyromegaly  Chest: Clear to auscultation bilaterally, no wheezes, rales or rhonchi or crackels, symmetric air entry  Heart: Normal S1, S2. No murmurs, rubs, clicks or gallops. Normal rate, regular rhythm  Abdomen: Soft, nontender, nondistended, no masses or organomegaly, Bowel sounds heard, G-tube in place  Neurological: Alert, oriented, normal speech, no acute focal findings  Skin and Extremities: Peripheral pulses normal, no pedal edema, no clubbing or cyanosis. No  "rashes- rash improved compared to 1/4    - I and  from ID, had a very prolonged disucssion with patient and wife about the dangers of TPN +Central access on 1/5  -Patient encountered by saying that he used to get infections with G-tube that were much more severe than his bloodstream infections.  -We explained that G-tube infections are typically bacterial, will take some time before the bacterial infection travels into the blood and becomes a bloodstream infection. However, if it is a central line access with the TPN, high risk of recurrent fungal infections, we talked about how bloodstream infections are more dangerous.  -However, patient stated that he is very fortunate in that he knows his body very well and can report immediately if he has any bloodstream infection  -We discussed with him that by the time he gets symptoms, it may be too late. I in particular explained the risks of endocarditis, septic emboli causing stroke and debilitation, endophtlamitis causing blindness, renal infarctions from sepsis, or morbidity and mortality.  - However, patient stated that he has a lot of trouble from using the tube feeds which cause him bloating and interfered with his quality of life.  He stated that he read extensively on the Internet about his condition, bloodstream infections and \"I weighed the options\" and prefers to take the risk of bloodstream infection.  He states that he knows he might die from it.  However he is willing to take that risk as he had \"vowed to myself\"  that he will never go back to a G-tube.  "

## 2019-01-07 ENCOUNTER — TELEPHONE (OUTPATIENT)
Dept: OPHTHALMOLOGY | Facility: CLINIC | Age: 49
End: 2019-01-07

## 2019-01-07 ENCOUNTER — OFFICE VISIT (OUTPATIENT)
Dept: OPHTHALMOLOGY | Facility: CLINIC | Age: 49
DRG: 314 | End: 2019-01-07
Attending: OPHTHALMOLOGY
Payer: COMMERCIAL

## 2019-01-07 DIAGNOSIS — B49 FUNGAL CHORIORETINITIS: ICD-10-CM

## 2019-01-07 DIAGNOSIS — B49 FUNGAL CHORIORETINITIS: Primary | ICD-10-CM

## 2019-01-07 DIAGNOSIS — H32 FUNGAL CHORIORETINITIS: Primary | ICD-10-CM

## 2019-01-07 DIAGNOSIS — H32 FUNGAL CHORIORETINITIS: ICD-10-CM

## 2019-01-07 LAB
ALBUMIN SERPL-MCNC: 3.4 G/DL (ref 3.4–5)
ALP SERPL-CCNC: 82 U/L (ref 40–150)
ALT SERPL W P-5'-P-CCNC: 26 U/L (ref 0–70)
ANION GAP SERPL CALCULATED.3IONS-SCNC: 9 MMOL/L (ref 3–14)
AST SERPL W P-5'-P-CCNC: 18 U/L (ref 0–45)
BACTERIA SPEC CULT: ABNORMAL
BILIRUB SERPL-MCNC: 0.4 MG/DL (ref 0.2–1.3)
BUN SERPL-MCNC: 12 MG/DL (ref 7–30)
CALCIUM SERPL-MCNC: 8.5 MG/DL (ref 8.5–10.1)
CHLORIDE SERPL-SCNC: 105 MMOL/L (ref 94–109)
CO2 SERPL-SCNC: 27 MMOL/L (ref 20–32)
CREAT SERPL-MCNC: 0.67 MG/DL (ref 0.66–1.25)
DEPRECATED CALCIDIOL+CALCIFEROL SERPL-MC: 19 UG/L (ref 20–75)
ERYTHROCYTE [DISTWIDTH] IN BLOOD BY AUTOMATED COUNT: 14 % (ref 10–15)
GFR SERPL CREATININE-BSD FRML MDRD: >90 ML/MIN/{1.73_M2}
GLUCOSE BLDC GLUCOMTR-MCNC: 106 MG/DL (ref 70–99)
GLUCOSE BLDC GLUCOMTR-MCNC: 95 MG/DL (ref 70–99)
GLUCOSE SERPL-MCNC: 86 MG/DL (ref 70–99)
HCT VFR BLD AUTO: 41.1 % (ref 40–53)
HGB BLD-MCNC: 13 G/DL (ref 13.3–17.7)
INR PPP: 1.14 (ref 0.86–1.14)
Lab: ABNORMAL
MAGNESIUM SERPL-MCNC: 2 MG/DL (ref 1.6–2.3)
MCH RBC QN AUTO: 31.3 PG (ref 26.5–33)
MCHC RBC AUTO-ENTMCNC: 31.6 G/DL (ref 31.5–36.5)
MCV RBC AUTO: 99 FL (ref 78–100)
PHOSPHATE SERPL-MCNC: 4.2 MG/DL (ref 2.5–4.5)
PLATELET # BLD AUTO: 145 10E9/L (ref 150–450)
POTASSIUM SERPL-SCNC: 3.6 MMOL/L (ref 3.4–5.3)
PREALB SERPL IA-MCNC: 14 MG/DL (ref 15–45)
PREALB SERPL IA-MCNC: 15 MG/DL (ref 15–45)
PROT SERPL-MCNC: 7.2 G/DL (ref 6.8–8.8)
RBC # BLD AUTO: 4.15 10E12/L (ref 4.4–5.9)
SODIUM SERPL-SCNC: 140 MMOL/L (ref 133–144)
SPECIMEN SOURCE: ABNORMAL
WBC # BLD AUTO: 6.2 10E9/L (ref 4–11)

## 2019-01-07 PROCEDURE — 92134 CPTRZ OPH DX IMG PST SGM RTA: CPT | Mod: ZF | Performed by: OPHTHALMOLOGY

## 2019-01-07 PROCEDURE — 99232 SBSQ HOSP IP/OBS MODERATE 35: CPT | Performed by: HOSPITALIST

## 2019-01-07 PROCEDURE — 83735 ASSAY OF MAGNESIUM: CPT | Performed by: STUDENT IN AN ORGANIZED HEALTH CARE EDUCATION/TRAINING PROGRAM

## 2019-01-07 PROCEDURE — 25000132 ZZH RX MED GY IP 250 OP 250 PS 637: Performed by: HOSPITALIST

## 2019-01-07 PROCEDURE — 80053 COMPREHEN METABOLIC PANEL: CPT | Performed by: STUDENT IN AN ORGANIZED HEALTH CARE EDUCATION/TRAINING PROGRAM

## 2019-01-07 PROCEDURE — 84100 ASSAY OF PHOSPHORUS: CPT | Performed by: STUDENT IN AN ORGANIZED HEALTH CARE EDUCATION/TRAINING PROGRAM

## 2019-01-07 PROCEDURE — 25000132 ZZH RX MED GY IP 250 OP 250 PS 637: Performed by: STUDENT IN AN ORGANIZED HEALTH CARE EDUCATION/TRAINING PROGRAM

## 2019-01-07 PROCEDURE — 85027 COMPLETE CBC AUTOMATED: CPT | Performed by: STUDENT IN AN ORGANIZED HEALTH CARE EDUCATION/TRAINING PROGRAM

## 2019-01-07 PROCEDURE — 92250 FUNDUS PHOTOGRAPHY W/I&R: CPT | Mod: ZF | Performed by: OPHTHALMOLOGY

## 2019-01-07 PROCEDURE — 12000001 ZZH R&B MED SURG/OB UMMC

## 2019-01-07 PROCEDURE — 85610 PROTHROMBIN TIME: CPT | Performed by: STUDENT IN AN ORGANIZED HEALTH CARE EDUCATION/TRAINING PROGRAM

## 2019-01-07 PROCEDURE — 84134 ASSAY OF PREALBUMIN: CPT | Performed by: STUDENT IN AN ORGANIZED HEALTH CARE EDUCATION/TRAINING PROGRAM

## 2019-01-07 PROCEDURE — 36415 COLL VENOUS BLD VENIPUNCTURE: CPT | Performed by: HOSPITALIST

## 2019-01-07 PROCEDURE — G0463 HOSPITAL OUTPT CLINIC VISIT: HCPCS | Mod: ZF

## 2019-01-07 PROCEDURE — 00000146 ZZHCL STATISTIC GLUCOSE BY METER IP

## 2019-01-07 PROCEDURE — 25000128 H RX IP 250 OP 636: Performed by: NURSE PRACTITIONER

## 2019-01-07 PROCEDURE — 25000131 ZZH RX MED GY IP 250 OP 636 PS 637: Performed by: STUDENT IN AN ORGANIZED HEALTH CARE EDUCATION/TRAINING PROGRAM

## 2019-01-07 PROCEDURE — 36415 COLL VENOUS BLD VENIPUNCTURE: CPT | Performed by: STUDENT IN AN ORGANIZED HEALTH CARE EDUCATION/TRAINING PROGRAM

## 2019-01-07 PROCEDURE — 87040 BLOOD CULTURE FOR BACTERIA: CPT | Performed by: HOSPITALIST

## 2019-01-07 PROCEDURE — 25000128 H RX IP 250 OP 636: Performed by: HOSPITALIST

## 2019-01-07 RX ORDER — SODIUM CHLORIDE 9 MG/ML
INJECTION, SOLUTION INTRAVENOUS CONTINUOUS
Status: DISCONTINUED | OUTPATIENT
Start: 2019-01-07 | End: 2019-01-09

## 2019-01-07 RX ORDER — LORAZEPAM 1 MG/1
TABLET ORAL
Qty: 50 TABLET | Refills: 0 | OUTPATIENT
Start: 2019-01-07

## 2019-01-07 RX ADMIN — OXYCODONE HYDROCHLORIDE 15 MG: 5 SOLUTION ORAL at 18:28

## 2019-01-07 RX ADMIN — ONDANSETRON 8 MG: 4 TABLET, ORALLY DISINTEGRATING ORAL at 14:19

## 2019-01-07 RX ADMIN — OXYCODONE HYDROCHLORIDE 15 MG: 5 SOLUTION ORAL at 03:05

## 2019-01-07 RX ADMIN — ONDANSETRON 8 MG: 4 TABLET, ORALLY DISINTEGRATING ORAL at 06:21

## 2019-01-07 RX ADMIN — MICAFUNGIN SODIUM 100 MG: 10 INJECTION, POWDER, LYOPHILIZED, FOR SOLUTION INTRAVENOUS at 22:33

## 2019-01-07 RX ADMIN — ACETAMINOPHEN 650 MG: 325 SOLUTION ORAL at 09:50

## 2019-01-07 RX ADMIN — OXYCODONE HYDROCHLORIDE 15 MG: 5 SOLUTION ORAL at 14:19

## 2019-01-07 RX ADMIN — ACETAMINOPHEN 650 MG: 325 SOLUTION ORAL at 18:28

## 2019-01-07 RX ADMIN — DEXTROAMPHETAMINE SACCHARATE, AMPHETAMINE ASPARTATE, DEXTROAMPHETAMINE SULFATE AND AMPHETAMINE SULFATE 20 MG: 2.5; 2.5; 2.5; 2.5 TABLET ORAL at 09:50

## 2019-01-07 RX ADMIN — SODIUM CHLORIDE: 9 INJECTION, SOLUTION INTRAVENOUS at 10:32

## 2019-01-07 RX ADMIN — FLUCONAZOLE, SODIUM CHLORIDE 400 MG: 2 INJECTION INTRAVENOUS at 19:51

## 2019-01-07 RX ADMIN — OXYCODONE HYDROCHLORIDE 15 MG: 5 SOLUTION ORAL at 09:50

## 2019-01-07 RX ADMIN — ONDANSETRON 8 MG: 4 TABLET, ORALLY DISINTEGRATING ORAL at 22:35

## 2019-01-07 RX ADMIN — ACETAMINOPHEN 650 MG: 325 SOLUTION ORAL at 22:34

## 2019-01-07 RX ADMIN — SODIUM CHLORIDE: 9 INJECTION, SOLUTION INTRAVENOUS at 22:53

## 2019-01-07 RX ADMIN — OXYCODONE HYDROCHLORIDE 15 MG: 5 SOLUTION ORAL at 22:35

## 2019-01-07 RX ADMIN — LORAZEPAM 2 MG: 1 TABLET ORAL at 22:35

## 2019-01-07 ASSESSMENT — EXTERNAL EXAM - RIGHT EYE: OD_EXAM: NORMAL

## 2019-01-07 ASSESSMENT — VISUAL ACUITY
METHOD: SNELLEN - LINEAR
OD_SC: 20/20
OD_SC+: -2
OS_SC: 20/20
OS_SC+: -1

## 2019-01-07 ASSESSMENT — EXTERNAL EXAM - LEFT EYE: OS_EXAM: NORMAL

## 2019-01-07 ASSESSMENT — ACTIVITIES OF DAILY LIVING (ADL)
ADLS_ACUITY_SCORE: 11

## 2019-01-07 ASSESSMENT — TONOMETRY
OD_IOP_MMHG: 11
IOP_METHOD: ICARE
OS_IOP_MMHG: 10

## 2019-01-07 ASSESSMENT — SLIT LAMP EXAM - LIDS
COMMENTS: NORMAL
COMMENTS: NORMAL

## 2019-01-07 ASSESSMENT — CONF VISUAL FIELD
OD_NORMAL: 1
METHOD: COUNTING FINGERS
OS_NORMAL: 1

## 2019-01-07 ASSESSMENT — MIFFLIN-ST. JEOR: SCORE: 1793.67

## 2019-01-07 ASSESSMENT — CUP TO DISC RATIO
OS_RATIO: 0.3
OD_RATIO: 0.3

## 2019-01-07 NOTE — PROGRESS NOTES
Dr owens to change PPN rate. When this staff went to pt room to chart rate change. Pt was not in room and had disconnected the PPN himself.

## 2019-01-07 NOTE — PROGRESS NOTES
"Patient has been educated on potential risks of choosing to leave the unit and the responsibility for patient well-being will belong to the patient. Pt has been informed that admission to hospital is due to need for medical treatment. Education given to the patient on some of the potential risks included but is not limited to:            lack of access to nursing and medical intervention            possible missed appointments with MD, therapies, tests            possible missed medications, antibiotics, management of IV's    Patient Response: \"I know and I understand. I always come back.\"  "

## 2019-01-07 NOTE — TELEPHONE ENCOUNTER
Last Written Prescription Date:  19  Last Fill Quantity: starter pack  ,  # refills:     Last office visit: 10/30/2018 with prescribing provider:    Mike Watt  Future Office Visit:   Next 5 appointments (look out 90 days)    2019  1:00 PM CST  Return Visit with Patti Milligan MD  [facility] ([facility]) [address] [address] [facility] [id number] [phone]           Requested Prescriptions   Pending Prescriptions Disp Refills     LORazepam (ATIVAN) 1 MG tablet 50 tablet 0     Si-2 mg as needed for anxiety with TPN and medications    There is no refill protocol information for this order        Routing refill request to provider for review/approval because:  Drug not on the [facility] refill protocol     Cinthya Jaimes RN

## 2019-01-07 NOTE — NURSING NOTE
"Chief Complaints and History of Present Illnesses   Patient presents with     Retinal Evaluation     Fungal Chorioretinitis.     Chief Complaint(s) and History of Present Illness(es)     Retinal Evaluation     Associated symptoms: Negative for dryness and redness    Comments: Fungal Chorioretinitis.              Comments     Pt states vision is \"not too bad\". Pt states states vision has become more blurry over the past year, gradual change. No eye pain today.  Occasional floaters BE, no changes.    Lizbeth TOBAR January 7, 2019 7:47 AM                  "

## 2019-01-07 NOTE — PROGRESS NOTES
CC -  Fungal endophthalmitis rule out    INTERVAL HISTORY -Mild vision blurring, More prominent floaters lately    HPI -   Parker Acevedo Sr. is a  48 year old year-old patient with history of gastric bypass, esophagojejunostromy, and chronic malnutrition (on TPN) and recurrent bacteremia who was hospitalized 1/2019 for fevers, chills since ~12/25/18 , and (+) blood cultures for yeast on routine draw from picc line 1/3/2019  . Started on micafungin and switched to Fluconazole on 1/5/19 due to concern for fungal chorioretinitis on inpatient exam and presents to retina clinic for further evaluation.       PAST OCULAR SURGERY  None    RETINAL IMAGING:  OCT 1-7-19  OD -  Macula - retina normal, PHF attached   Lesion - mild RPE elevation, no vitritis, distinct borders  OS -  Macula - retina normal, PHF attached   Lesion - focal RPE/outer retinal atrophy    ASSESSMENT & PLAN  1. Fungal chorioretinitis OU   - CR lesions could be resolving infection   - no vitreous infiltration/no vitritis   - CPM with azole   - recheck 1 week      2.  Vitreous syneresis OU        3. DBH OD   - ?etiology, ?HTn      RTC 1 week, OCT OU      Prabhu Wilson M.D.  PGY-3, Ophthalmology      ATTESTATION     Attending Attestation:     Complete documentation of historical and exam elements from today's encounter can be found in the full encounter summary report (not reduplicated in this progress note).  I personally obtained the chief complaint(s) and history of present illness.  I confirmed and edited as necessary the review of systems, past medical/surgical history, family history, social history, and examination findings as documented by others; and I examined the patient myself.  I personally reviewed the relevant tests, images, and reports as documented above.  I personally reviewed the ophthalmic test(s) associated with this encounter, agree with the interpretation(s) as documented by the resident/fellow, and have edited the  corresponding report(s) as necessary.   I formulated and edited as necessary the assessment and plan and discussed the findings and management plan with the patient and family    Veronique Alonso MD, PhD  , Vitreoretinal Surgery  Department of Ophthalmology  [facility]

## 2019-01-07 NOTE — PROGRESS NOTES
"Patient has been educated on potential risks of choosing to leave the unit and the responsibility for patient well-being will belong to the patient. Pt has been informed that admission to hospital is due to need for medical treatment. Education given to the patient on some of the potential risks included but is not limited to:            lack of access to nursing and medical intervention            possible missed appointments with MD, therapies, tests            possible missed medications, antibiotics, management of IV's    Patient Response: \"I know. I will come back at 7:30pm for my medicine. I usually leave a note on the board the time I left for a walk.\"    "

## 2019-01-07 NOTE — TELEPHONE ENCOUNTER
Pt to f/u with Retina MD for fungal chorioretinitis eval.    Inpatient on 5B    Scheduled with dr. Alonso at 0740 AM today.  Updated 5B on appointment and assist in transportation to [facility] this AM  Rene Don RN 7:40 AM 01/07/19

## 2019-01-08 ENCOUNTER — APPOINTMENT (OUTPATIENT)
Dept: GENERAL RADIOLOGY | Facility: CLINIC | Age: 49
DRG: 314 | End: 2019-01-08
Attending: OPHTHALMOLOGY
Payer: COMMERCIAL

## 2019-01-08 ENCOUNTER — ANESTHESIA (OUTPATIENT)
Dept: SURGERY | Facility: CLINIC | Age: 49
DRG: 314 | End: 2019-01-08
Payer: COMMERCIAL

## 2019-01-08 ENCOUNTER — ANESTHESIA EVENT (OUTPATIENT)
Dept: SURGERY | Facility: CLINIC | Age: 49
DRG: 314 | End: 2019-01-08
Payer: COMMERCIAL

## 2019-01-08 ENCOUNTER — APPOINTMENT (OUTPATIENT)
Dept: CARDIOLOGY | Facility: CLINIC | Age: 49
DRG: 314 | End: 2019-01-08
Attending: OPHTHALMOLOGY
Payer: COMMERCIAL

## 2019-01-08 LAB
ANNOTATION COMMENT IMP: ABNORMAL
ERYTHROCYTE [DISTWIDTH] IN BLOOD BY AUTOMATED COUNT: 14 % (ref 10–15)
GLUCOSE BLDC GLUCOMTR-MCNC: 103 MG/DL (ref 70–99)
GLUCOSE BLDC GLUCOMTR-MCNC: 106 MG/DL (ref 70–99)
GLUCOSE BLDC GLUCOMTR-MCNC: 95 MG/DL (ref 70–99)
HCT VFR BLD AUTO: 38.1 % (ref 40–53)
HGB BLD-MCNC: 12 G/DL (ref 13.3–17.7)
MCH RBC QN AUTO: 31.2 PG (ref 26.5–33)
MCHC RBC AUTO-ENTMCNC: 31.5 G/DL (ref 31.5–36.5)
MCV RBC AUTO: 99 FL (ref 78–100)
PLATELET # BLD AUTO: 157 10E9/L (ref 150–450)
RBC # BLD AUTO: 3.85 10E12/L (ref 4.4–5.9)
RETINYL PALMITATE SERPL-MCNC: 0.05 MG/L (ref 0–0.1)
TRIGL SERPL-MCNC: 100 MG/DL
VIT A SERPL-MCNC: 0.2 MG/L (ref 0.3–1.2)
VIT B1 BLD-MCNC: 145 NMOL/L (ref 70–180)
WBC # BLD AUTO: 6.1 10E9/L (ref 4–11)

## 2019-01-08 PROCEDURE — 25000125 ZZHC RX 250: Performed by: HOSPITALIST

## 2019-01-08 PROCEDURE — 36000051 ZZH SURGERY LEVEL 2 1ST 30 MIN - UMMC

## 2019-01-08 PROCEDURE — 25000125 ZZHC RX 250: Performed by: INTERNAL MEDICINE

## 2019-01-08 PROCEDURE — 25000132 ZZH RX MED GY IP 250 OP 250 PS 637: Performed by: HOSPITALIST

## 2019-01-08 PROCEDURE — 40000141 ZZH STATISTIC PERIPHERAL IV START W/O US GUIDANCE

## 2019-01-08 PROCEDURE — 93320 DOPPLER ECHO COMPLETE: CPT | Mod: 26 | Performed by: INTERNAL MEDICINE

## 2019-01-08 PROCEDURE — 37000009 ZZH ANESTHESIA TECHNICAL FEE, EACH ADDTL 15 MIN

## 2019-01-08 PROCEDURE — 27210208 ZZH KIT VALVED DOUBLE LUMEN

## 2019-01-08 PROCEDURE — 00000146 ZZHCL STATISTIC GLUCOSE BY METER IP

## 2019-01-08 PROCEDURE — 84478 ASSAY OF TRIGLYCERIDES: CPT | Performed by: HOSPITALIST

## 2019-01-08 PROCEDURE — 37000008 ZZH ANESTHESIA TECHNICAL FEE, 1ST 30 MIN

## 2019-01-08 PROCEDURE — 93325 DOPPLER ECHO COLOR FLOW MAPG: CPT

## 2019-01-08 PROCEDURE — 85027 COMPLETE CBC AUTOMATED: CPT | Performed by: STUDENT IN AN ORGANIZED HEALTH CARE EDUCATION/TRAINING PROGRAM

## 2019-01-08 PROCEDURE — B24BZZ4 ULTRASONOGRAPHY OF HEART WITH AORTA, TRANSESOPHAGEAL: ICD-10-PCS | Performed by: INTERNAL MEDICINE

## 2019-01-08 PROCEDURE — 25000132 ZZH RX MED GY IP 250 OP 250 PS 637: Performed by: STUDENT IN AN ORGANIZED HEALTH CARE EDUCATION/TRAINING PROGRAM

## 2019-01-08 PROCEDURE — 40000986 XR CHEST PORT 1 VW

## 2019-01-08 PROCEDURE — 36569 INSJ PICC 5 YR+ W/O IMAGING: CPT

## 2019-01-08 PROCEDURE — 36415 COLL VENOUS BLD VENIPUNCTURE: CPT | Performed by: STUDENT IN AN ORGANIZED HEALTH CARE EDUCATION/TRAINING PROGRAM

## 2019-01-08 PROCEDURE — 93312 ECHO TRANSESOPHAGEAL: CPT | Mod: 26 | Performed by: INTERNAL MEDICINE

## 2019-01-08 PROCEDURE — 93325 DOPPLER ECHO COLOR FLOW MAPG: CPT | Mod: 26 | Performed by: INTERNAL MEDICINE

## 2019-01-08 PROCEDURE — 25000128 H RX IP 250 OP 636: Performed by: NURSE ANESTHETIST, CERTIFIED REGISTERED

## 2019-01-08 PROCEDURE — 25000131 ZZH RX MED GY IP 250 OP 636 PS 637: Performed by: STUDENT IN AN ORGANIZED HEALTH CARE EDUCATION/TRAINING PROGRAM

## 2019-01-08 PROCEDURE — 36592 COLLECT BLOOD FROM PICC: CPT | Performed by: HOSPITALIST

## 2019-01-08 PROCEDURE — 99233 SBSQ HOSP IP/OBS HIGH 50: CPT | Performed by: INTERNAL MEDICINE

## 2019-01-08 PROCEDURE — 25000132 ZZH RX MED GY IP 250 OP 250 PS 637: Performed by: INTERNAL MEDICINE

## 2019-01-08 PROCEDURE — 25000128 H RX IP 250 OP 636: Performed by: EMERGENCY MEDICINE

## 2019-01-08 PROCEDURE — 12000001 ZZH R&B MED SURG/OB UMMC

## 2019-01-08 PROCEDURE — 40000170 ZZH STATISTIC PRE-PROCEDURE ASSESSMENT II

## 2019-01-08 PROCEDURE — 36000053 ZZH SURGERY LEVEL 2 EA 15 ADDTL MIN - UMMC

## 2019-01-08 PROCEDURE — 25000125 ZZHC RX 250: Performed by: NURSE ANESTHETIST, CERTIFIED REGISTERED

## 2019-01-08 RX ORDER — NALOXONE HYDROCHLORIDE 4 MG/.1ML
SPRAY NASAL
Status: ON HOLD | COMMUNITY
Start: 2018-10-08 | End: 2019-01-09

## 2019-01-08 RX ORDER — KETAMINE HYDROCHLORIDE 10 MG/ML
INJECTION, SOLUTION INTRAMUSCULAR; INTRAVENOUS PRN
Status: DISCONTINUED | OUTPATIENT
Start: 2019-01-08 | End: 2019-01-08

## 2019-01-08 RX ORDER — LORAZEPAM 1 MG/1
TABLET ORAL
Status: ON HOLD | COMMUNITY
Start: 2018-12-24 | End: 2019-01-09

## 2019-01-08 RX ORDER — HEPARIN SODIUM,PORCINE 10 UNIT/ML
2-5 VIAL (ML) INTRAVENOUS
Status: COMPLETED | OUTPATIENT
Start: 2019-01-08 | End: 2019-01-09

## 2019-01-08 RX ORDER — NEEDLES, SAFETY 22GX1 1/2"
NEEDLE, DISPOSABLE MISCELLANEOUS
COMMUNITY
Start: 2018-09-27 | End: 2019-07-25

## 2019-01-08 RX ORDER — PROPOFOL 10 MG/ML
INJECTION, EMULSION INTRAVENOUS CONTINUOUS PRN
Status: DISCONTINUED | OUTPATIENT
Start: 2019-01-08 | End: 2019-01-08

## 2019-01-08 RX ORDER — FENTANYL CITRATE 50 UG/ML
INJECTION, SOLUTION INTRAMUSCULAR; INTRAVENOUS PRN
Status: DISCONTINUED | OUTPATIENT
Start: 2019-01-08 | End: 2019-01-08

## 2019-01-08 RX ORDER — FLUCONAZOLE 200 MG/1
400 TABLET ORAL DAILY
Status: DISCONTINUED | OUTPATIENT
Start: 2019-01-08 | End: 2019-01-09 | Stop reason: HOSPADM

## 2019-01-08 RX ORDER — LIDOCAINE 40 MG/G
CREAM TOPICAL
Status: DISCONTINUED | OUTPATIENT
Start: 2019-01-08 | End: 2019-01-09 | Stop reason: HOSPADM

## 2019-01-08 RX ORDER — PROPOFOL 10 MG/ML
INJECTION, EMULSION INTRAVENOUS PRN
Status: DISCONTINUED | OUTPATIENT
Start: 2019-01-08 | End: 2019-01-08

## 2019-01-08 RX ORDER — SODIUM CHLORIDE, SODIUM LACTATE, POTASSIUM CHLORIDE, CALCIUM CHLORIDE 600; 310; 30; 20 MG/100ML; MG/100ML; MG/100ML; MG/100ML
INJECTION, SOLUTION INTRAVENOUS CONTINUOUS PRN
Status: DISCONTINUED | OUTPATIENT
Start: 2019-01-08 | End: 2019-01-08

## 2019-01-08 RX ORDER — LIDOCAINE HYDROCHLORIDE 20 MG/ML
INJECTION, SOLUTION INFILTRATION; PERINEURAL PRN
Status: DISCONTINUED | OUTPATIENT
Start: 2019-01-08 | End: 2019-01-08

## 2019-01-08 RX ADMIN — ONDANSETRON 8 MG: 4 TABLET, ORALLY DISINTEGRATING ORAL at 08:06

## 2019-01-08 RX ADMIN — LIDOCAINE HYDROCHLORIDE 60 MG: 20 INJECTION, SOLUTION INFILTRATION; PERINEURAL at 15:10

## 2019-01-08 RX ADMIN — LIDOCAINE HYDROCHLORIDE 3 ML: 10 INJECTION, SOLUTION EPIDURAL; INFILTRATION; INTRACAUDAL; PERINEURAL at 17:53

## 2019-01-08 RX ADMIN — FENTANYL CITRATE 50 MCG: 50 INJECTION, SOLUTION INTRAMUSCULAR; INTRAVENOUS at 14:58

## 2019-01-08 RX ADMIN — ACETAMINOPHEN 650 MG: 325 SOLUTION ORAL at 22:21

## 2019-01-08 RX ADMIN — SUCRALFATE 1 G: 1 SUSPENSION ORAL at 13:00

## 2019-01-08 RX ADMIN — PROPOFOL 20 MG: 10 INJECTION, EMULSION INTRAVENOUS at 15:19

## 2019-01-08 RX ADMIN — ONDANSETRON 8 MG: 4 TABLET, ORALLY DISINTEGRATING ORAL at 22:21

## 2019-01-08 RX ADMIN — Medication 10 MG: at 15:14

## 2019-01-08 RX ADMIN — OXYCODONE HYDROCHLORIDE 15 MG: 5 SOLUTION ORAL at 08:06

## 2019-01-08 RX ADMIN — FLUCONAZOLE 400 MG: 200 TABLET ORAL at 19:09

## 2019-01-08 RX ADMIN — OXYCODONE HYDROCHLORIDE 15 MG: 5 SOLUTION ORAL at 02:50

## 2019-01-08 RX ADMIN — DEXTROAMPHETAMINE SACCHARATE, AMPHETAMINE ASPARTATE, DEXTROAMPHETAMINE SULFATE AND AMPHETAMINE SULFATE 20 MG: 2.5; 2.5; 2.5; 2.5 TABLET ORAL at 08:06

## 2019-01-08 RX ADMIN — MIDAZOLAM 2 MG: 1 INJECTION INTRAMUSCULAR; INTRAVENOUS at 14:51

## 2019-01-08 RX ADMIN — Medication 10 MG: at 15:17

## 2019-01-08 RX ADMIN — SODIUM CHLORIDE, POTASSIUM CHLORIDE, SODIUM LACTATE AND CALCIUM CHLORIDE: 600; 310; 30; 20 INJECTION, SOLUTION INTRAVENOUS at 14:51

## 2019-01-08 RX ADMIN — OXYCODONE HYDROCHLORIDE 15 MG: 5 SOLUTION ORAL at 22:21

## 2019-01-08 RX ADMIN — ONDANSETRON 4 MG: 2 INJECTION INTRAMUSCULAR; INTRAVENOUS at 15:10

## 2019-01-08 RX ADMIN — LORAZEPAM 2 MG: 1 TABLET ORAL at 22:25

## 2019-01-08 RX ADMIN — OXYCODONE HYDROCHLORIDE 15 MG: 5 SOLUTION ORAL at 17:54

## 2019-01-08 RX ADMIN — ACETAMINOPHEN 650 MG: 325 SOLUTION ORAL at 17:54

## 2019-01-08 RX ADMIN — I.V. FAT EMULSION 250 ML: 20 EMULSION INTRAVENOUS at 20:49

## 2019-01-08 RX ADMIN — PROPOFOL 100 MCG/KG/MIN: 10 INJECTION, EMULSION INTRAVENOUS at 15:12

## 2019-01-08 RX ADMIN — POTASSIUM CHLORIDE: 2 INJECTION, SOLUTION, CONCENTRATE INTRAVENOUS at 20:40

## 2019-01-08 RX ADMIN — OXYCODONE HYDROCHLORIDE 15 MG: 5 SOLUTION ORAL at 13:00

## 2019-01-08 ASSESSMENT — ACTIVITIES OF DAILY LIVING (ADL)
ADLS_ACUITY_SCORE: 11

## 2019-01-08 ASSESSMENT — LIFESTYLE VARIABLES: TOBACCO_USE: 1

## 2019-01-08 ASSESSMENT — MIFFLIN-ST. JEOR: SCORE: 1790.04

## 2019-01-08 ASSESSMENT — ENCOUNTER SYMPTOMS
SEIZURES: 0
DYSRHYTHMIAS: 0

## 2019-01-08 NOTE — PROGRESS NOTES
[facility] ID SERVICE: ONGOING CONSULTATION       Patient:  Parker Acevedo Sr., Date of birth 1970, Medical record number [id number]  Date of Visit:  January 8, 2019         Assessment and Recommendations:   Problem List:  Candidemia  - Chronic Malnutrition with TPN dependence via prior RUE PICC  - Celestino-en-Y Gastric Bypass  - Short Gut Syndrome    47yo M with PMH of Celestino-en-Y gastric bypass, esophagojejunostomy c/b short gut syndrome, and chronic malnutrition on TPN with recurrent bacteremia and was admitted for fevers, chills, and blood cultures positive for yeast from home health care blood draw from PICC line. Fungemia is likely related to PICC line as he has a previous history of recurrent port/PICC line infections. PICC line removed on 1/4, culture positive for candida.     Ophtho has found fungal chorioretinitis on exam. Candida has returned sensitive to fluconazole. Thus, would stop micafungin, and transition IV fluconazole to oral.     Awaiting RONEL which will help with duration. If RONEL negative, we can treat for 14 days. If RONEL positive, or unable to be done, would plan for 6 weeks.     Recommendations:  1. Stop Micafungin  2. Stop IV fluconazole  3. Start po fluconazole 400mg daily  4. Await RONEL results    ID will continue to follow.           Attestation:  I have reviewed today's vital signs, medications, labs and imaging.  Mikaela Ma MD  Pager [phone]          Interval History:       Spoke with patient this am. No pain or discomfort. He is willing to undergo RONEL.          Review of Systems:   CONSTITUTIONAL:  No fevers or chills  EYES: negative for icterus  ENT:  negative for oral lesions, hearing loss, tinnitus and sore throat  RESPIRATORY:  negative for cough and dyspnea  CARDIOVASCULAR:  negative for chest pain, palpitations  GASTROINTESTINAL:  negative for nausea, vomiting, diarrhea and constipation  GENITOURINARY:  negative for dysuria  HEME:  No easy  bruising/bleeding  INTEGUMENT:  negative for rash and pruritus  NEURO:  Negative for headache         Current Antimicrobials   Fluconazole 400mg IV q24h  Micafungin 100mg IV q24h         Physical Exam:   Ranges for vital signs:  Temp:  [97.4  F (36.3  C)-98.4  F (36.9  C)] 98.4  F (36.9  C)  Pulse:  [79-87] 79  Heart Rate:  [84] 84  Resp:  [16-18] 18  BP: (122-135)/(75-82) 131/82  SpO2:  [97 %-99 %] 99 %      Exam:  GENERAL:  well-developed, well-nourished, in no acute distress.   ENT:  Head is normocephalic, atraumatic. Oropharynx is moist without exudates or ulcers.  EYES:  Eyes have anicteric sclerae. PERRL.   NECK:  Supple. No cervical lymphadenopathy  LUNGS:  Clear to auscultation bilaterally. No wheezes or crackles.  CARDIOVASCULAR:  Regular rate and rhythm with no murmurs, gallops or rubs.  ABDOMEN:  Normal bowel sounds, soft, nontender. No hepatosplenomegaly.   EXT: Extremities warm and without edema.  SKIN:  No acute rashes.   NEUROLOGIC:  Grossly nonfocal.    ACCESS: PIV         Laboratory Data:     Creatinine   Date Value Ref Range Status   01/07/2019 0.67 0.66 - 1.25 mg/dL Final   01/06/2019 0.65 (L) 0.66 - 1.25 mg/dL Final   01/05/2019 0.64 (L) 0.66 - 1.25 mg/dL Final   01/04/2019 0.71 0.66 - 1.25 mg/dL Final   01/04/2019 0.85 0.66 - 1.25 mg/dL Final     WBC   Date Value Ref Range Status   01/08/2019 6.1 4.0 - 11.0 10e9/L Final   01/07/2019 6.2 4.0 - 11.0 10e9/L Final   01/06/2019 5.7 4.0 - 11.0 10e9/L Final   01/05/2019 5.6 4.0 - 11.0 10e9/L Final   01/04/2019 4.5 4.0 - 11.0 10e9/L Final     Hemoglobin   Date Value Ref Range Status   01/08/2019 12.0 (L) 13.3 - 17.7 g/dL Final     Platelet Count   Date Value Ref Range Status   01/08/2019 157 150 - 450 10e9/L Final     Sed Rate   Date Value Ref Range Status   01/23/2018 10 0 - 15 mm/h Final   01/20/2018 11 0 - 15 mm/h Final   09/24/2017 31 (H) 0 - 15 mm/h Final   06/28/2017 26 (H) 0 - 15 mm/h Final   03/12/2017 17 (H) 0 - 15 mm/h Final     CRP  Inflammation   Date Value Ref Range Status   08/20/2018 <2.9 0.0 - 8.0 mg/L Final   06/28/2018 <2.9 0.0 - 8.0 mg/L Final   06/21/2018 <2.9 0.0 - 8.0 mg/L Final   06/02/2018 30.0 (H) 0.0 - 8.0 mg/L Final   06/01/2018 26.0 (H) 0.0 - 8.0 mg/L Final     AST   Date Value Ref Range Status   01/07/2019 18 0 - 45 U/L Final   01/05/2019 20 0 - 45 U/L Final   01/04/2019 16 0 - 45 U/L Final   01/02/2019 20 0 - 45 U/L Final   12/18/2018 18 0 - 45 U/L Final     ALT   Date Value Ref Range Status   01/07/2019 26 0 - 70 U/L Final   01/05/2019 26 0 - 70 U/L Final   01/04/2019 26 0 - 70 U/L Final   01/02/2019 22 0 - 70 U/L Final   12/18/2018 27 0 - 70 U/L Final     Bilirubin Total   Date Value Ref Range Status   01/07/2019 0.4 0.2 - 1.3 mg/dL Final   01/05/2019 0.4 0.2 - 1.3 mg/dL Final   01/04/2019 0.4 0.2 - 1.3 mg/dL Final   01/02/2019 0.5 0.2 - 1.3 mg/dL Final   12/18/2018 0.8 0.2 - 1.3 mg/dL Final     Lab Results   Component Value Date     01/07/2019    BUN 12 01/07/2019    CO2 27 01/07/2019       Culture data:  1/6 Blood cultures NGTD  1/4 PICC culture: Candida albicans      All cultures:  Recent Labs   Lab 01/07/19 2022 01/07/19  2008 01/06/19  0923 01/06/19  0922 01/04/19  1000 01/04/19  0250 01/04/19  0239 01/04/19  0238 01/02/19  1556 01/02/19  1548   CULT No growth after 9 hours No growth after 9 hours No growth after 2 days No growth after 2 days Single colony  Candida albicans / dubliniensis  Speciation in progress  *  Referred to mycology for identification <10,000 colonies/mL  urogenital deepa  Susceptibility testing not routinely done   Cultured on the 1st day of incubation:  Candida albicans  *  Critical Value/Significant Value, preliminary result only, called to and read back by   MARILOU HURST RN @9453 1/4/19. CT    Susceptibility testing done on previous specimen Cultured on the 2nd day of incubation:  Candida albicans  *  Critical Value/Significant Value, preliminary result only, called to and read  back by  Valerie Su RN on 5b at 1047 on 1/5/19 ac.    Susceptibility testing done on previous specimen Cultured on the 3rd day of incubation:  Candida albicans  *  Critical Value/Significant Value, preliminary result only, called to and read back by  Perla Kim RN on 1.4.19 at 1649. bw    Susceptibility testing done on previous specimen Cultured on the 1st day of incubation:  Candida albicans  isolated  *  Critical Value/Significant Value, preliminary result only, called to and read back by  DAVID CRUZ RN 4331 1.3.19 NDP       Imaging:  MR L spine  1. No abnormal signal within the spine to suggest fungal infection.  Mild nonspecific enhancement and STIR hyperintensity in the right  posterior paraspinous muscles, potentially myositis.  2. Multilevel lumbar spondylosis.    TTE: No vegetation or mass identified, however this does not exclude endocarditis.  Global and regional left ventricular function is normal with an EF of 60-65%.  The right ventricle is normal size.  Global right ventricular function is normal.  No significant valvular dysfunction.

## 2019-01-08 NOTE — PROGRESS NOTES
"Patient has been educated on potential risks of choosing to leave the unit POST RONEL WHICH HE RECEIVED PROPOFOL, VERSED, FENTANYL, KETAMINE and the responsibility for patient well-being will belong to the patient. Pt has been informed that admission to hospital is due to need for medical treatment. Education given to the patient on some of the potential risks included but is not limited to:            lack of access to nursing and medical intervention            possible missed appointments with MD, therapies, tests            possible missed medications, antibiotics, management of IV's  .  DIZZINESS, FALL RISK     Patient Response: \"I'm fine, I'm going with my wife.\" Writer continued to educate pt that he is at risk for falls due to meds given during RONEL and he said \"I told you I don't care, I\"m fine and I'm not staying up here. I'm just going to walk around by IR.\"    "

## 2019-01-08 NOTE — ANESTHESIA POSTPROCEDURE EVALUATION
Anesthesia POST Procedure Evaluation    Patient: Parker Acevedo Sr.   MRN:     [id number] Gender:   male   Age:    48 year old :      1970        Preoperative Diagnosis: Rule Out Endocarditis   Procedure(s):  TRANSESOPHAGEAL ECHOCARDIOGRAM INTRAOPERATIVE   Postop Comments: No value filed.       Anesthesia Type:  MAC    Reportable Event: NO     PAIN: Uncomplicated   Sign Out status: Comfortable, Well controlled pain     PONV: No PONV   Sign Out status:  No Nausea or Vomiting     Neuro/Psych: Uneventful perioperative course   Sign Out Status: Preoperative baseline     Airway/Resp.: Uneventful perioperative course   Sign Out Status: Resp. Status within EXPECTED Parameters     CV: Uneventful perioperative course   Sign Out status: Appropriate BP and perfusion indices; Appropriate HR/Rhythm     Disposition:   Sign Out in:  PACU  Recovery Course: Uneventful  Follow-Up: Not required           Last Anesthesia Record Vitals:  CRNA VITALS  2019 1504 - 2019 1546      2019             Resp Rate (observed):  1  (Abnormal)     Resp Rate (set):  10          Last PACU/Preop Vitals:  Vitals:    19 1434 19 2206 19 0719   BP: 122/75 135/76 131/82   Pulse: 87  79   Resp: 16 18 18   Temp: 36.4  C (97.5  F) 36.3  C (97.4  F) 36.9  C (98.4  F)   SpO2: 97% 99% 99%         Electronically Signed By: Sara Mary MD, 2019, 3:46 PM

## 2019-01-08 NOTE — PROGRESS NOTES
[facility], [facility] Progress Note - Hospitalist Service, Gold 2      Plan for tomorrow:  - Discuss with ID team about  PICC placement if his BCx are negative for 48 hours, potential home antifungal regimen and timing of d/c.   - If patient can get PICC, start TPN  - Patient is quite set on leaving on Wednesday. Needs advanced updates about discharge plans as his wife needs to drive 3 hours to come pick him up  - Discuss with cardiology in AM about need for RONEL      Date of Admission:  1/4/2019  Assessment & Plan      Parker Acevedo Sr. is a 48 year old male admitted on 1/4/2019. He has a history of Celestino-en-Y gastric bypass, esophagojejunostromy, and chronic malnutrition on TPN with recurrent bacteriemia and is admitted for fevers, chills, and blood cultures positive for yeast.    # Sepsis from Candidemia c/b chorioretinitis due to PICC line infection iso chronic TPN  Patient comes to the ED after blood cultures taken at home came back positive for yeast. He has been having fevers, chills, diaphoresis, myalgia, rash, and weakness since December 25, 2018. In the ED, patient was started on micafungin and repeat blood cultures were drawn. He has a PICC through which he receives TPN; this is his most likely source of infection. TPN is a major risk factor for candidiasis. He did not have a leukocytosis. He has a PICC in place, but in the past he has had multiple ports.   - Switched to 400mg IV Fluconazole on 1/5 per optho recs, per ID on 1/6, OK to continue, on 1/7, we are adding Micafungin back given potential Fluconazole resistance  - Appreciate ID recs  - ECHO w no endocarditis.    - ID team requesting RONEL per their notes . Need to discuss with patient and will need to discuss with Cardiology in AM  - Patient is QUITE adamant that he is leaving at 10am on Wednesday. I did explain to him that Wednesday was only a tentative date and I cannot guarantee that now as we are now  "having to add the Micafungin and we need to determine the Anti-fungal regimen prior to discharge. I encouraged him to discuss with ID tomorrow about his determination to be discharged on Wednesday  - He stated that he has been telling us all along he is leaving on Wednesday, and he will leave Wednesday.   - I told him that we want him to be safe and that we need to make sure we get the right regimen for him.  - He stated \"That is not my problem. I'm leaving on Wednesday at 10am\"        # Chronic Malnutrition on TPN  Patient only able to take small bites of food. Otherwise he has severe nausea and vomiting.   - Seen by bariatric service, ordered bariatric lab panel. Patient does not wish to do GJ tube  - Meanwhile, continue peripheral nutrition & PO  - He is not taking PPN until we place PICC line as PPN bothers his peripheral IV sites causing discomfort. Orders dc'ed for now  - We will need central line placed for TPN once cultures cleared- Rpt cultures drawn on 1/6, 1/7  - Ondansetron for nausea/ vomiting      # Anxiety   # Attention Deficit Hyperactivity Disorder   Takes lorazepam before bed every night   - Continue PTA lorazepam  - Continue PTA amphetamine-dextroamphetamine (Adderall)     # Chronic Pain  Patient has chronic abdominal pain  - Continue PTAoxycodone     # History of Cardiomyopathy  - Currently holding PTA carvedilol in the setting of blood stream infection and risk for sepsis.     Diet: Room Service  Regular Diet Adult    DVT Prophylaxis: Low Risk/Ambulatory with no VTE prophylaxis indicated  Sanchez Catheter: not present  Code Status: Full Code      Disposition Plan   Expected discharge: 2 - 3 days, recommended to prior living arrangement once adequate pain management/ tolerating PO medications.  Entered: Mike Alvarez MD 01/07/2019, 7:43 PM       The patient's care was discussed with the Bedside Nurse.    Mike Alvarez MD  Hospitalist Service, [address]" [facility], [place]  Please see sticky note for cross cover information  ______________________________________________________________________    Interval History   Reports chronic abdominal pain at baseline  Feels much better today, cough improved  No other symptoms/complaints at this time, eager to have his PICC placed, would like to have discharge in early AM, so would prefer to go on Tuesday    Physical Exam   Vital Signs: Temp: 97.5  F (36.4  C) Temp src: Oral BP: 122/75 Pulse: 87 Heart Rate: 72 Resp: 16 SpO2: 97 % O2 Device: None (Room air)    Weight: 194 lbs 12.8 oz  General Appearance: Alert, well appearing, and in no acute distress  Mental Status: Oriented to person, place, and time  HEENT: No pallor or icterus. Pupils equal and reactive, extraocular eye movements intact. Mucous membranes moist, pharynx normal without lesions. Neck supple, no significant adenopathy, or thyromegaly  Chest: Clear to auscultation bilaterally, no wheezes, rales or rhonchi or crackels, symmetric air entry  Heart: Normal S1, S2. No murmurs, rubs, clicks or gallops. Normal rate, regular rhythm  Abdomen: Soft, nontender, nondistended, no masses or organomegaly, Bowel sounds heard, G-tube in place  Neurological: Alert, oriented, normal speech, no acute focal findings  Skin and Extremities: Peripheral pulses normal, no pedal edema, no clubbing or cyanosis. No rashes- rash improved compared to 1/4    - I and  from ID, had a very prolonged disucssion with patient and wife about the dangers of TPN +Central access on 1/5  -Patient encountered by saying that he used to get infections with G-tube that were much more severe than his bloodstream infections.  -We explained that G-tube infections are typically bacterial, will take some time before the bacterial infection travels into the blood and becomes a bloodstream infection. However, if it is a central line access with the TPN, high risk of recurrent fungal  "infections, we talked about how bloodstream infections are more dangerous.  -However, patient stated that he is very fortunate in that he knows his body very well and can report immediately if he has any bloodstream infection  -We discussed with him that by the time he gets symptoms, it may be too late. I in particular explained the risks of endocarditis, septic emboli causing stroke and debilitation, endophtlamitis causing blindness, renal infarctions from sepsis, or morbidity and mortality.  - However, patient stated that he has a lot of trouble from using the tube feeds which cause him bloating and interfered with his quality of life.  He stated that he read extensively on the Internet about his condition, bloodstream infections and \"I weighed the options\" and prefers to take the risk of bloodstream infection.  He states that he knows he might die from it.  However he is willing to take that risk as he had \"vowed to myself\"  that he will never go back to a G-tube.  "

## 2019-01-08 NOTE — ANESTHESIA PREPROCEDURE EVALUATION
Anesthesia Pre-Procedure Evaluation    Patient: Parker Acevedo .   MRN:     [id number] Gender:   male   Age:    48 year old :      1970        Preoperative Diagnosis: Rule Out Endocarditis   Procedure(s):  TRANSESOPHAGEAL ECHOCARDIOGRAM INTRAOPERATIVE     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)     mild EF ~45% on rest 17, improves with stressing     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)      Chronic abdominal pain      Complication of anesthesia      Difficulty swallowing      Gastric ulcer, unspecified as acute or chronic, without mention of hemorrhage, perforation, or obstruction      Gastro-oesophageal reflux disease      Generalized weakness 2018     Head injury      Hiatal hernia      Other bladder disorder      Other chronic pain      PONV (postoperative nausea and vomiting)      Severe malnutrition (H)     TPN     Short gut syndrome      Tobacco abuse       Past Surgical History:   Procedure Laterality Date     AMPUTATION       APPENDECTOMY       BACK SURGERY  11/3/2014    curve in the spine     BIOPSY LYMPH NODE CERVICAL N/A 2015    Procedure: BIOPSY LYMPH NODE CERVICAL;  Surgeon: Baron Scanlon MD;  Location: [facility]     C GASTRIC BYPASS,OBESE<100CM SHAYLEE-EN-Y  2002    lost 300 pounds     CHOLECYSTECTOMY       DISCECTOMY, FUSION CERVICAL ANTERIOR ONE LEVEL, COMBINED N/A 2/15/2017    Procedure: COMBINED DISCECTOMY, FUSION CERVICAL ANTERIOR ONE LEVEL;  Surgeon: Darren Campos MD;  [facility]     ENDOSCOPIC INSERTION TUBE GASTROSTOMY  2013    Procedure: ENDOSCOPIC INSERTION TUBE GASTROSTOMY;;  Surgeon: Francis Vyas MD;  [facility]     ENDOSCOPIC ULTRASOUND UPPER GASTROINTESTINAL TRACT (GI)  2011    Procedure:ENDOSCOPIC ULTRASOUND UPPER GASTROINTESTINAL TRACT (GI); Both Procedures done Conjointly; Surgeon:NEREIDA HOUSER;  [facility]     ENDOSCOPIC ULTRASOUND UPPER GASTROINTESTINAL TRACT (GI)  9/9/2013    Procedure: ENDOSCOPIC ULTRASOUND UPPER GASTROINTESTINAL TRACT (GI);  Endoscopic Ultrasound Guide Gastrostomy Tube Placement  C-arm;  Surgeon: Noe Lizarraga MD;  [facility]     ENDOSCOPY  03/25/11    EGD, [facility]     ENDOSCOPY  08/04/09    Upper Endoscopy, [facility]     ENDOSCOPY  01/05/09    Upper Endoscopy, [facility]     ESOPHAGOSCOPY, GASTROSCOPY, DUODENOSCOPY (EGD), COMBINED  4/20/2011    Procedure:COMBINED ESOPHAGOSCOPY, GASTROSCOPY, DUODENOSCOPY (EGD); Surgeon:BLU VEGA; [facility]     ESOPHAGOSCOPY, GASTROSCOPY, DUODENOSCOPY (EGD), COMBINED  6/15/2011    Procedure:COMBINED ESOPHAGOSCOPY, GASTROSCOPY, DUODENOSCOPY (EGD); Surgeon:BLU VEGA; [facility]     ESOPHAGOSCOPY, GASTROSCOPY, DUODENOSCOPY (EGD), COMBINED  6/12/2013    Procedure: COMBINED ESOPHAGOSCOPY, GASTROSCOPY, DUODENOSCOPY (EGD);;  Surgeon: Blu Veag MD;  [facility]     ESOPHAGOSCOPY, GASTROSCOPY, DUODENOSCOPY (EGD), COMBINED  11/22/2013    Procedure: COMBINED ESOPHAGOSCOPY, GASTROSCOPY, DUODENOSCOPY (EGD);;  Surgeon: Blu Vega MD;  [facility]     ESOPHAGOSCOPY, GASTROSCOPY, DUODENOSCOPY (EGD), COMBINED  4/30/2014    Procedure: COMBINED ESOPHAGOSCOPY, GASTROSCOPY, DUODENOSCOPY (EGD);  Surgeon: Blu Vega MD;  [facility]     ESOPHAGOSCOPY, GASTROSCOPY, DUODENOSCOPY (EGD), COMBINED N/A 2/20/2015    Procedure: COMBINED ESOPHAGOSCOPY, GASTROSCOPY, DUODENOSCOPY (EGD), BIOPSY SINGLE OR MULTIPLE;  Surgeon: Baron Scanlon MD;  Location:  OR     ESOPHAGOSCOPY, GASTROSCOPY, DUODENOSCOPY (EGD), COMBINED N/A 9/30/2015    Procedure: COMBINED ESOPHAGOSCOPY, GASTROSCOPY, DUODENOSCOPY (EGD);  Surgeon: Blu Vega MD;  [facility]     GASTRECTOMY  6/22/2012    Procedure: GASTRECTOMY;  Open Approach, Excise Ulcers,Partial Gastrectomy,  Esophagojejunostomy, Hiatal Hernia Repair, Extensive Lysis of Adhesions and Esaphagogastrodudenoscopy.;  Surgeon: Francis Vyas MD;  [facility]     GASTROJEJUNOSTOMY  08/26/09    Extensice enterolysis, partial resect. jejunum, part. resect gastric pouch, gastrojejunostomy anastomosis     HC ESOPH/GAS REFLUX TEST W NASAL IMPED ELECTRODE  8/5/2013    Procedure: ESOPHAGEAL IMPEDENCE FUNCTION TEST 1 HOUR OR LESS;  Surgeon: Halie Lang MD;  [facility]     HEAD & NECK SURGERY  2/15/2017    C5-C6     HERNIA REPAIR  2006    Umbilical hernia     HERNIORRHAPHY HIATAL  6/22/2012    Procedure: HERNIORRHAPHY HIATAL;;  Surgeon: Francis Vyas MD;  [facility]     HERNIORRHAPHY INGUINAL  11/22/2013    Procedure: HERNIORRHAPHY INGUINAL;;  Surgeon: Francis Vyas MD;  [facility]     INSERT PORT VASCULAR ACCESS Right 12/19/2017    Procedure: INSERT PORT VASCULAR ACCESS;  Right Chest Port Placement ;  Surgeon: Lisandro Alejandro PA-C;  Location:  OR     INSERT PORT VASCULAR ACCESS Right 8/2/2018    Procedure: INSERT PORT VASCULAR ACCESS;  Place single lumen tunneled central venous access catheter;  Surgeon: Guy Jamil PA-C;  [facility]     LAPAROTOMY EXPLORATORY  11/22/2013    Procedure: LAPAROTOMY EXPLORATORY;  Exploratory Laparotomy, Upper Endoscopy, Left Inguinal Hernia Repair;  Surgeon: Francis Vyas MD;  [facility]     ORTHOPEDIC SURGERY       PICC INSERTION Right 03/16/2017    5fr DL BioFlo PICC, 42cm (3cm external) in the R medial brachial vein w/ tip in the SVC RA junction.     PICC INSERTION Left 09/23/2017    5fr DL BioFlo PICC, 45cm (1cm external) in the L basilic vein w/ tip in the SVC RA junction.     SHAYLEE EN Y BOWEL  2003     SOFT TISSUE SURGERY       SOFT TISSUE SURGERY       THORACIC SURGERY       TONSILLECTOMY            Anesthesia Evaluation     . Pt has had prior anesthetic.     History of anesthetic complications   -  PONV        ROS/MED HX    ENT/Pulmonary:     (+)tobacco use, Current use , . .   (-) asthma   Neurologic:      (-) seizures, CVA and TIA   Cardiovascular: Comment: TTE 1/4/2018:  Interpretation Summary  No vegetation or mass identified, however this does not exclude endocarditis.  Global and regional left ventricular function is normal with an EF of 60-65%.  The right ventricle is normal size.  Global right ventricular function is normal.  No significant valvular dysfunction.     This study was compared with the study from 6/3/2018 .  There has been no change. Recommend RONEL if clinical suspicion is high.           _____________________________________________________________________________  __        Left Ventricle  Left ventricular size is normal. Global and regional left ventricular function  is normal with an EF of 60-65%. Left ventricular wall thickness is normal.  Left ventricular diastolic function is normal.     Right Ventricle  The right ventricle is normal size. Global right ventricular function is  normal.     Atria  The right atria appears normal. Moderate left atrial enlargement is present.  The atrial septum is intact as assessed by color Doppler .     Mitral Valve  The mitral valve is normal.        Aortic Valve  Aortic valve is normal in structure and function. The aortic valve is  tricuspid.     Tricuspid Valve  The tricuspid valve is normal.     Pulmonic Valve  The pulmonic valve is normal. Trace pulmonic insufficiency is present.     Vessels  The aorta root is normal. The inferior vena cava was normal in size with  preserved respiratory variability. Estimated mean right atrial pressure is 3  mmHg (normal).     Pericardium  No pericardial effusion is present.        Compared to Previous Study  This study was compared with the study from 6/3/2018 . There has been no  change.       (-) arrhythmias and irregular heartbeat/palpitations   METS/Exercise Tolerance:  3 - Able to walk 1-2 blocks without  "stopping   Hematologic:         Musculoskeletal:         GI/Hepatic: Comment: S/p gastric bypass c/b short gut syndrome on TPN    (+) GERD      (-) liver disease   Renal/Genitourinary:      (-) renal disease   Endo:         Psychiatric:         Infectious Disease:         Malignancy:         Other:                         PHYSICAL EXAM:   Mental Status/Neuro: A/A/O   Airway: Facies: Feasible  Mallampati: I  Mouth/Opening: Full  TM distance: > 6 cm  Neck ROM: Full   Respiratory: Auscultation: CTAB     Resp. Rate: Normal     Resp. Effort: Normal      CV: Rhythm: Regular  Heart: Normal Sounds  Pulses: Normal   Comments:      Dental:  Dental Comments: Full upper denture, partial lower denture, both out                Lab Results   Component Value Date    WBC 6.1 01/08/2019    HGB 12.0 (L) 01/08/2019    HCT 38.1 (L) 01/08/2019     01/08/2019    CRP <2.9 08/20/2018    SED 10 01/23/2018     01/07/2019    POTASSIUM 3.6 01/07/2019    CHLORIDE 105 01/07/2019    CO2 27 01/07/2019    BUN 12 01/07/2019    CR 0.67 01/07/2019    GLC 86 01/07/2019    SILAS 8.5 01/07/2019    PHOS 4.2 01/07/2019    MAG 2.0 01/07/2019    ALBUMIN 3.4 01/07/2019    PROTTOTAL 7.2 01/07/2019    ALT 26 01/07/2019    AST 18 01/07/2019    ALKPHOS 82 01/07/2019    BILITOTAL 0.4 01/07/2019    LIPASE 125 06/28/2017    AMYLASE 178 (H) 06/28/2012    PTT 35 04/11/2016    INR 1.14 01/07/2019    FIBR 324 10/25/2016    TSH 0.96 11/03/2014       Preop Vitals  BP Readings from Last 3 Encounters:   01/08/19 131/82   10/30/18 126/80   10/29/18 149/70    Pulse Readings from Last 3 Encounters:   01/08/19 79   10/30/18 84   10/29/18 57      Resp Readings from Last 3 Encounters:   01/08/19 18   10/30/18 16   10/14/18 18    SpO2 Readings from Last 3 Encounters:   01/08/19 99%   10/30/18 100%   10/14/18 97%      Temp Readings from Last 1 Encounters:   01/08/19 36.9  C (98.4  F) (Oral)    Ht Readings from Last 1 Encounters:   01/04/19 1.816 m (5' 11.5\")      Wt " "Readings from Last 1 Encounters:   01/07/19 88.4 kg (194 lb 12.8 oz)    Estimated body mass index is 26.79 kg/m  as calculated from the following:    Height as of this encounter: 1.816 m (5' 11.5\").    Weight as of this encounter: 88.4 kg (194 lb 12.8 oz).     LDA:  Peripheral IV 01/08/19 Left;Lateral Lower forearm (Active)   Site Assessment [facility] 1/8/2019  1:00 PM   Line Status Saline locked 1/8/2019  1:00 PM   Phlebitis Scale 0-->no symptoms 1/8/2019  1:00 PM   Number of days: 0       Gastrostomy/Enterostomy Gastrostomy LUQ 1 18 fr (Active)   Site Description [facility] 1/8/2019 12:00 PM   Site care cleansed with soap and water 1/7/2019  4:20 PM   Status - Gastrostomy Clamped 1/8/2019 12:00 PM   Number of days: [id number]            Assessment:   ASA SCORE: 3    NPO Status: > 6 hours since completed Solid Foods   Documentation: H&P complete; Preop Testing complete; Consents complete   Proceeding: Proceed without further delay  Tobacco Use:  Active user of Tobacco     Plan:   Anes. Type:  MAC                          PONV Management:  Adult Risk Factors:, H/o PONV or Motion Sickness     CONSENT: Direct conversation   Plan and risks discussed with: Patient          Comments for Plan/Consent:  MAC. PIVx1. Standard ASA monitors. Floor vs PACU postop. Discussed GA and airway adjuncts if any respiratory concerns arise.     All Q&A answered from patient/family                           Jeet Hernandez MD  "

## 2019-01-08 NOTE — PROGRESS NOTES
Care Coordinator - Discharge Planning    Admission Date/Time:  1/4/2019  Attending MD:  Cecilia Bee,*     Data  Date of initial CC assessment:  1/4/19  Chart reviewed, discussed with interdisciplinary team.   Patient was admitted for:   1. Infection by Candida species    2. Candidiasis    3. Gastric bypass status for obesity    4. Weight loss, non-intentional    5. Fungemia    6. Short gut syndrome    7. S/P bariatric surgery    8. Bacteremia         Assessment   Full assessment completed in previous note   Pt status reviewed/discussed with Ronnie Bryan 2.  Anticipate that pt will have PICC placement done with discharge home tomorrow.  Pt will resume TPN this evening.  Pt will be discharged home on oral antibiotics.   Updated TatumDAVID Ingram Liaison.   Discharge orders reviewed.       Coordination of Care and Referrals: Provided patient/family with options for Home Infusion.      Plan  Anticipated Discharge Date:  1/9/19  Anticipated Discharge Plan:  Home    CTS Handoff completed:  BRAYAN Rivas RN BSN, PHN RN Care Coordinator  Medicine Teams: Gold 1; Gold 2; ED/Observation   [phone]  Pager: [phone]  Weekend RN Care Coordinator job code * * * [id number]  1/8/2019 1:02 PM

## 2019-01-08 NOTE — PROGRESS NOTES
CLINICAL NUTRITION SERVICES - REASSESSMENT NOTE     Nutrition Prescription    RECOMMENDATIONS FOR MDs/PROVIDERS TO ORDER:  Patient with low level vitamin A and D. May consider the following supplementation:   -- DELMAR chewables, @ 1 chewables daily: To provide 18,167 units of vitamin A, 2000 units of vitamin D, 100 units of vitamin E and 1000 mcg of vitamin K. Recheck fat soluble vitamin level in 4 weeks and if normal, can discontinue supplementation.     Malnutrition Status:    Severe malnutrition in the context of chronic condition     Recommendations already ordered by Registered Dietitian (RD):  1. Sent pharmacy TPN change order for the following, modified home regimen to better meet needs:     --Dosing wt: 88 kg based on lowest wt this admit on 1/7/19    - Goal PN (1800 ml total volume per home regimen x 14 hour cycle PN ) with 200 grams dextrose,132 grams AA and 250 ml IV lipids x 5 days per week (average daily 180 ml/day):      - Refeeding precaution: Has been on PPN for the past 4 days, Providing ~ 132 gm dextrose /day, (GIR: 1.0),  If Mg++/K+ /Phos normal, begin PN with dextrose 165 gm /day (GIR:1.3). Once pt receives ~100% of initial continuous PN volume with K+/Mg++/Phos  WNL, advance PN dextrose by 35 gm to goal dextrose of 200 gm/day and run x 14 hour cycle over night per home regimen.       - Goal TPN Provides:1565 kcals/day (18 kcal/kg/day + Pending PO calories ), 1.5 gm PRO/kg/day, 200 gm CHO/day, GIR: 1.58 mg/kg/min and 23% of kcals from IV lipids.      2. Pharmacy Please Order Repeat LFTs/TG every Monday to assess for TPN tolerance    3. Micros per PharmD    Future/Additional Recommendations:  If wt loss, can increase lipids to 6-7 days per week.        Provider consult: Pharmacy/Nutrition to start and manage TPN:     EVALUATION OF THE PROGRESS TOWARD GOALS   Diet:     Nutrition Support:  Started on PPN on 1/4 with Clinimix 5/4.25% with daily 250 ml lipids.     Intake:   PO: Patient only able to  take small bites of food. Otherwise he has severe nausea and vomiting. Patient does not wish to do G-tube feed. Has history of G-tube feeds and per chart review, had complication with his TFs.     PPN: 19, started on ClinimixE (peripheral) @ 110 ml/hr (2640 ml daily) with 250 ml of 20% IV lipids daily providing 1398 kcals (15 kcal/kg/day), 112 gm PRO (1.2 gm/kg/day), 132 gm Dex, and 36% of kcals from fat.      *PPN met 75% of est energy needs and 100% of est protein needs until patient's central line is replace and home TPN is able to be resumed.        ASSESSED NUTRITION NEEDS  Dosing Weight: 88 kg (lowest wt this admit on ):   Estimated Energy Needs: 1760 - 2200 kcals/day (20 - 25 kcals/kg)  Justification: Maintenance (PN)   Estimated Protein Needs: 112 - 140 grams protein/day (1.2 - 1.5 grams of pro/kg) Justification: Increased needs with history of gastric bypass     NEW FINDINGS   PMH of Celestino-en-Y gastric bypass, esophagojejunostomy c/b short gut syndrome, and chronic malnutrition on TPN with recurrent bacteremia.    --Patient  was admitted for fevers, chills, and blood cultures positive for yeast.  PICC line removed on , culture positive for candida. Plan for PICC placement today  and restart of home TPN.     --No pedal edema    Wt trend:  Admit wt: 92.6 kg (204 lb 3.2 oz) on --> down to 88.4 kg (194 lb 12.8 oz)  Most recent wt on . Wt loss 4.2 kg since admit --> 4.5% significant wt loss since admit.    Lab reviewed:   Low vitamin A + D on   Lytes: within normal range ( K+: 3.6, Mg++: 2.0, Phos: 4.2).   T (1/) and normal, Alk phos: 82, ALT: 26, AST: 18 - all within normal range     MALNUTRITION  % Intake: decrease intake does not meet criteria, ( on nutrition support)  % Weight Loss: > 2% in 1 week (severe)  Subcutaneous Fat Loss: Previously Facial region: Severe and Thoracic/intercostal: Mild-moderate  Muscle Loss: Previously Temporal, Facial & jaw region, Scapular bone and  Thoracic region (clavicle, acromium bone, deltoid, trapezius, pectoral): Moderate   Fluid Accumulation/Edema: None noted  Malnutrition Diagnosis: Severe malnutrition in the context of chronic condition     Previous Goals   Total avg nutritional intake to meet a minimum of 20 kcal/kg and 1.2 gm PRO/kg daily (per dosing wt 93 kg).  Evaluation: Not met    Previous Nutrition Diagnosis  Inadequate oral intake related to reliant on PN as evidenced by patient consuming < 25% of estimated needs orally, therefore requiring CPN to meet at least 75% of est nutritional needs.      Evaluation: No change    CURRENT NUTRITION DIAGNOSIS  Inadequate oral intake related to alter GI with severe nausea and vomiting with small amount of PO as evidenced by TPN dependence over the past 3-4 years for nutrition maintenance and previously with history of remnant gastrostomy feeding with complications.     INTERVENTIONS  Implementation  Parenteral Nutrition/IV Fluids - Sent pharmacy TPN change order based on home regimen    Goals  Total avg nutritional intake to meet a minimum of 20 kcal/kg and 1.2 gm PRO/kg daily (per dosing wt 88 kg lowest wt this admit ).    Monitoring/Evaluation  Progress toward goals will be monitored and evaluated per protocol.    Gautam Mckeon RD/BLANKA  Pager [phone]

## 2019-01-09 ENCOUNTER — HOME INFUSION (PRE-WILLOW HOME INFUSION) (OUTPATIENT)
Dept: PHARMACY | Facility: CLINIC | Age: 49
End: 2019-01-09

## 2019-01-09 ENCOUNTER — TRANSFERRED RECORDS (OUTPATIENT)
Dept: HEALTH INFORMATION MANAGEMENT | Facility: CLINIC | Age: 49
End: 2019-01-09

## 2019-01-09 ENCOUNTER — PATIENT OUTREACH (OUTPATIENT)
Dept: CARE COORDINATION | Facility: CLINIC | Age: 49
End: 2019-01-09

## 2019-01-09 VITALS
SYSTOLIC BLOOD PRESSURE: 123 MMHG | HEART RATE: 72 BPM | DIASTOLIC BLOOD PRESSURE: 81 MMHG | OXYGEN SATURATION: 98 % | TEMPERATURE: 98.1 F | WEIGHT: 194 LBS | HEIGHT: 72 IN | BODY MASS INDEX: 26.28 KG/M2 | RESPIRATION RATE: 18 BRPM

## 2019-01-09 LAB
ALBUMIN SERPL-MCNC: 3.3 G/DL (ref 3.4–5)
ALP SERPL-CCNC: 85 U/L (ref 40–150)
ALT SERPL W P-5'-P-CCNC: 33 U/L (ref 0–70)
ANION GAP SERPL CALCULATED.3IONS-SCNC: 5 MMOL/L (ref 3–14)
AST SERPL W P-5'-P-CCNC: 23 U/L (ref 0–45)
BACTERIA SPEC CULT: ABNORMAL
BILIRUB DIRECT SERPL-MCNC: <0.1 MG/DL (ref 0–0.2)
BILIRUB SERPL-MCNC: 0.3 MG/DL (ref 0.2–1.3)
BUN SERPL-MCNC: 18 MG/DL (ref 7–30)
CALCIUM SERPL-MCNC: 8.4 MG/DL (ref 8.5–10.1)
CHLORIDE SERPL-SCNC: 107 MMOL/L (ref 94–109)
CO2 SERPL-SCNC: 29 MMOL/L (ref 20–32)
CREAT SERPL-MCNC: 0.65 MG/DL (ref 0.66–1.25)
ERYTHROCYTE [DISTWIDTH] IN BLOOD BY AUTOMATED COUNT: 13.9 % (ref 10–15)
GFR SERPL CREATININE-BSD FRML MDRD: >90 ML/MIN/{1.73_M2}
GLUCOSE BLDC GLUCOMTR-MCNC: 111 MG/DL (ref 70–99)
GLUCOSE SERPL-MCNC: 88 MG/DL (ref 70–99)
HCT VFR BLD AUTO: 38.6 % (ref 40–53)
HGB BLD-MCNC: 11.9 G/DL (ref 13.3–17.7)
INR PPP: 1.18 (ref 0.86–1.14)
Lab: ABNORMAL
MAGNESIUM SERPL-MCNC: 2.2 MG/DL (ref 1.6–2.3)
MCH RBC QN AUTO: 30.4 PG (ref 26.5–33)
MCHC RBC AUTO-ENTMCNC: 30.8 G/DL (ref 31.5–36.5)
MCV RBC AUTO: 99 FL (ref 78–100)
PHOSPHATE SERPL-MCNC: 3.8 MG/DL (ref 2.5–4.5)
PLATELET # BLD AUTO: 173 10E9/L (ref 150–450)
POTASSIUM SERPL-SCNC: 4.3 MMOL/L (ref 3.4–5.3)
PREALB SERPL IA-MCNC: 17 MG/DL (ref 15–45)
PROT SERPL-MCNC: 7.2 G/DL (ref 6.8–8.8)
RBC # BLD AUTO: 3.91 10E12/L (ref 4.4–5.9)
SODIUM SERPL-SCNC: 141 MMOL/L (ref 133–144)
SPECIMEN SOURCE: ABNORMAL
SPECIMEN SOURCE: ABNORMAL
WBC # BLD AUTO: 7 10E9/L (ref 4–11)
ZINC SERPL-MCNC: 52 UG/DL (ref 60–120)

## 2019-01-09 PROCEDURE — 82248 BILIRUBIN DIRECT: CPT | Performed by: HOSPITALIST

## 2019-01-09 PROCEDURE — 83735 ASSAY OF MAGNESIUM: CPT | Performed by: HOSPITALIST

## 2019-01-09 PROCEDURE — 80053 COMPREHEN METABOLIC PANEL: CPT | Performed by: HOSPITALIST

## 2019-01-09 PROCEDURE — 25000132 ZZH RX MED GY IP 250 OP 250 PS 637: Performed by: INTERNAL MEDICINE

## 2019-01-09 PROCEDURE — 85610 PROTHROMBIN TIME: CPT | Performed by: HOSPITALIST

## 2019-01-09 PROCEDURE — 25000128 H RX IP 250 OP 636: Performed by: INTERNAL MEDICINE

## 2019-01-09 PROCEDURE — 25000132 ZZH RX MED GY IP 250 OP 250 PS 637: Performed by: HOSPITALIST

## 2019-01-09 PROCEDURE — 99239 HOSP IP/OBS DSCHRG MGMT >30: CPT | Performed by: INTERNAL MEDICINE

## 2019-01-09 PROCEDURE — 36592 COLLECT BLOOD FROM PICC: CPT | Performed by: HOSPITALIST

## 2019-01-09 PROCEDURE — 84100 ASSAY OF PHOSPHORUS: CPT | Performed by: HOSPITALIST

## 2019-01-09 PROCEDURE — 00000146 ZZHCL STATISTIC GLUCOSE BY METER IP

## 2019-01-09 PROCEDURE — 25000131 ZZH RX MED GY IP 250 OP 636 PS 637: Performed by: STUDENT IN AN ORGANIZED HEALTH CARE EDUCATION/TRAINING PROGRAM

## 2019-01-09 PROCEDURE — 85027 COMPLETE CBC AUTOMATED: CPT | Performed by: HOSPITALIST

## 2019-01-09 PROCEDURE — 25000132 ZZH RX MED GY IP 250 OP 250 PS 637: Performed by: STUDENT IN AN ORGANIZED HEALTH CARE EDUCATION/TRAINING PROGRAM

## 2019-01-09 PROCEDURE — 84134 ASSAY OF PREALBUMIN: CPT | Performed by: HOSPITALIST

## 2019-01-09 RX ORDER — FLUCONAZOLE 200 MG/1
400 TABLET ORAL DAILY
Qty: 28 TABLET | Refills: 0 | Status: SHIPPED | OUTPATIENT
Start: 2019-01-10 | End: 2019-01-09

## 2019-01-09 RX ORDER — FLUCONAZOLE 200 MG/1
400 TABLET ORAL DAILY
Qty: 20 TABLET | Refills: 0 | Status: SHIPPED | OUTPATIENT
Start: 2019-01-10 | End: 2019-03-11

## 2019-01-09 RX ADMIN — OXYCODONE HYDROCHLORIDE 15 MG: 5 SOLUTION ORAL at 11:15

## 2019-01-09 RX ADMIN — ONDANSETRON 8 MG: 4 TABLET, ORALLY DISINTEGRATING ORAL at 07:26

## 2019-01-09 RX ADMIN — ACETAMINOPHEN 650 MG: 325 SOLUTION ORAL at 11:14

## 2019-01-09 RX ADMIN — OXYCODONE HYDROCHLORIDE 15 MG: 5 SOLUTION ORAL at 03:09

## 2019-01-09 RX ADMIN — OXYCODONE HYDROCHLORIDE 15 MG: 5 SOLUTION ORAL at 07:26

## 2019-01-09 RX ADMIN — Medication 3 ML: at 06:03

## 2019-01-09 RX ADMIN — DEXTROAMPHETAMINE SACCHARATE, AMPHETAMINE ASPARTATE, DEXTROAMPHETAMINE SULFATE AND AMPHETAMINE SULFATE 20 MG: 2.5; 2.5; 2.5; 2.5 TABLET ORAL at 07:26

## 2019-01-09 RX ADMIN — FLUCONAZOLE 400 MG: 200 TABLET ORAL at 07:26

## 2019-01-09 RX ADMIN — SUCRALFATE 1 G: 1 SUSPENSION ORAL at 11:15

## 2019-01-09 RX ADMIN — ACETAMINOPHEN 650 MG: 325 SOLUTION ORAL at 07:26

## 2019-01-09 ASSESSMENT — ACTIVITIES OF DAILY LIVING (ADL)
ADLS_ACUITY_SCORE: 11

## 2019-01-09 NOTE — PLAN OF CARE
Infection  Infection Symptom Resolution  1/4/2019 0632 - No Change by Shaina Cohen RN   A:  patient arrived from ED with spouse.  Ambulated to bed, gait steady.  States pain controlled with prn oxycodone given times one.  Reviewed home meds.  Profile started.  Pcd's and iv pump ordered.  R:  continue to monitor and treat per plan and care.  Needs diet order.  PICC needs to be discontinued today.  Will need ppn for nutrition due to nausea and unable to get adequate nutrition po.     
"**UPDATE** Pt refusing TPN and lipids at 0550. States he \"feels bad from it\" and would like infusion to stop.  Nurse educated pt about importance of finishing ordered TPN, however pt still refusing to finish. PICC line now saline locked. Will continue to monitor.    Status: Pt slept intermittently through the night. BS check overnight 111.   VS: VSS.   Pain: Complaints of generalized pain, oxycodone 15mg administered with some relief.   Neuro: Pt alert and oriented x4.   Respiratory: Breathing adequately on RA.   Skin: G tube on stomach, WDL.   IV/Drains: PICC line placed yesterday, dried blood noted at insertion site. Cycled TPN infusing at 138mL/hr w/ lipids.   GI/: No BM this shift. Passing flatus. Voiding appropriately.   Diet: Regular diet, poor appetite.   Activity: Up independently in room. Ambulates frequently.   Plan of care: Pt likely to discharge this AM. Continue to monitor and follow POC.     "
"Assumed cares 0700 to 1900.     /89 (BP Location: Left arm)   Pulse 99   Temp 100.7  F (38.2  C) (Oral)   Resp 16   Ht 1.816 m (5' 11.5\")   Wt 92.7 kg (204 lb 4.8 oz)   SpO2 96%   BMI 28.10 kg/m       Pain: Reported in abd. Oxy admin x 2.   Neuro: A and O x 4.  Respiratory: Lung sounds clear and equal on RA.  Cardiac/Neurovascular: HR and pulses WDL. Trace LE edema.  GI/: Bowel sounds active. Adequate UOP.  Nutrition: Ate 25% lunch, working on dinner. Little appetite. Nausea reported. Admin zofran x 2 and carafate x 1. BG 99 and 106 today.   Activity: Up ind.  Skin: Rash/scabbing present across body d/t yeast infection?  Lines: PIV in RUE, infusing PPN (initiated this shift), site WDL.  Events this shift: K 3.1, replaced and came back at 3.5, next RN to replace again. Mg 1.9, replaced and recheck tomorrow. Positive blood cultures displaying yeast from both L arm and R PICC. PICC removed this shift per orders d/t infection. ECHO performed. RUE US performed to r/o DVT. Opthamology and ID consults placed. T max of 102.1--admin tylenol x 2, temp came down to 100.7.      Plan: Continue to monitor.     "
"Assumed cares assumed cares 1500 to 1900.     /78 (BP Location: Right arm)   Pulse 86   Temp 98.3  F (36.8  C) (Oral)   Resp 18   Ht 1.816 m (5' 11.5\")   Wt 89.7 kg (197 lb 12.8 oz)   SpO2 98%   BMI 27.20 kg/m       Pain: Declines.  Respiratory: Lung sounds diminished on RA.  Neuro: A and O x 4.   Cardiac/Neurovascular: HR and pulses WDL. Trace LE edema.  GI/: Bowel sounds active. Adequate UOP. Jim button in abd, site slightly reddened.  Nutrition: No PO intake this shift.  Activity: Up ind.  Skin: Rash/scabbing present across body.  Lines: PIV in RUE only (LUE PIV went bad, awaiting placement of another PIV). PPN infusing at 110/hr (PPN stopped on day shift per pt request. Writer came on and tried to restart but pt refused despite education on risks. Pt eventually allowed writer to restart PPN). BG 94 this shift.     Events this shift: Pt started trying to eat dinner around 1900, then had pain and nausea--admin PRN zofran and oxy; previously pt declined any pain or nausea.     Plan: Continue to monitor. Mg needs replacing, however must await placement of new IV.     "
"Patient continues with nausea and generalized pain. Some relief with PRN medication. Has been venting kassandra tube multiple times throughout the day. Up ambulating frequently in davis/outside. Wife at bedside and attentive to patient. Plan for patient to get RONEL in [place] but per patient, he needs to be \"totally out with general anesthesia\" with his gag reflex. Left for OR around 1345. Orders for PICC to be placed and TPN to start this evening. Possible discharge home tomorrow? Continue to assist as needed and follow plan of care.  "
"Patient has been educated on potential risks of choosing to leave the unit and the responsibility for patient well-being will belong to the patient. Pt has been informed that admission to hospital is due to need for medical treatment. Education given to the patient on some of the potential risks included but is not limited to:            lack of access to nursing and medical intervention            possible missed appointments with MD, therapies, tests            possible missed medications, antibiotics, management of IV's    Patient Response: \"I know, I always make it back in time\".     "
"Patient has been educated on potential risks of choosing to leave the unit and the responsibility for patient well-being will belong to the patient. Pt has been informed that admission to hospital is due to need for medical treatment. Education given to the patient on some of the potential risks included but is not limited to:            lack of access to nursing and medical intervention            possible missed appointments with MD, therapies, tests            possible missed medications, antibiotics, management of IV's    Patient Response: \"I know, I write my time in - time out on the board\".     "
A&Ox4, max temp 102F otherwise VSS on RA.  Tylenol given for fever but not  effective, reports chills but no rigors noted.  PPN running at 110cc/hr via L PIV.  2nd PIV placed, but immediately infiltrated.  K is 3.5, pt refused oral K+ and is refusing additional PIV placement, so unable to replace IV K+ (MD aware - says ok to unhook PPN for an hour to run IV antifungal overnight if pt refusing PIV).  Pt c/o lower abd pain, received oxycodone x1.  Pt c/o insomnia, requesting additional dose of ativan (graciela RICHARD, awaiting reply).  Up ad vitor, went outside with wife.  Poor appetite.  Pt c/o urinary hesitancy (per baseline).  Continue to monitor.   
Assumed cares at 1900. PRN oxycodone x1 for pain. PPN infusing per order; declined ordered lipids, stating that he has been told previously to not have lipids via PIV and prefers to wait until he is home to restart lipids. Up ad vitor. Will continue to monitor and notify MD of any updates.   
Assumed cares at 2330. Pt asleep at start of shift. Woke up shortly after for assessments and medication. Pt complained of pain at various times during the night. Received oxycodone. New PIV inserted on right hand. Pt received scheduled anti-fungal medication. Temperature was elevated at start of shift. Pt left the unit a couple times. Wife at bedside. BG at midnight was 138. TPN infusing at 110. Continue with plan of care.  
Assumed cares at 2330. Pt complained of pain at various times during the night. Received oxycodone twice so far. Wife at bedside. BG at midnight was 148. TPN infusing at 110. Pt slept through the night except for when he called for pain medication. Continue with plan of care.     
Assumed cares at 2330. Pt complained of pain at various times during the night. Received oxycodone. BG at midnight was 106. PPN is currently off because pt unhooked the IV. Pt was going to hook himself back up to an exposed line. Pt was educated about how his actions could cause infection and was told the IV had to be discontinued till a new bag is sent. Will let next RN know. Dr owens about situation. Continue with plan of care.     
MRI of back completed tonight which shows possible myositis. Pt only has one PIV in right arm now and c/o pain at site with diflucan and flushing. Writer advised pt that if he has pain at IV site, IV should be discontinued but pt wants to save IV because he is a hard stick. Writer restarted PPN at 70ml/hr instead of 110ml/h per pt request. PCDs on. Ophthalmology clinic appt 1/7. Pt said  Said transport will take him through the tunnel in a wheelchair which will work if the appt is in [place]. [place] [facility] [place] [place] [id number]) pt will need ride since clinic is two blocks away. Oxycodone and tylenol given for abd/back pain. Cont to monitor and with poc.   
Patient went to eye clinic this morning and back around 0945. Continues to c/o generalized pain and anxious to get PICC placed. Some relief with pain medication. Plan to place PICC tomorrow pending negative blood culture for 48 hours. PPN discontinued. Will start TPN with PICC. Receiving IVF through right PIV. Patient ambulates out in davis/outside frequently. Jim button intact. Continue to assist as needed and follow plan of care.  
Pt ambulating frequently in halls. Oxycodone and tylenol for abd/back pain with some relief. Started micafungin IV tonight x1 at 40ml/hr (pt wanted lower rate because IV is sore but he wants to keep it in and keep infusing meds). Programmed MIV to run at 40ml/hr as well. Pt anxious to have PICC placed tomorrow pending negative BC so he discharge home Wednesday at 10:00. Cont to monitor pain.   
Pt had a RONEL (no vegetation) and DL PICC placed this evening and tolerated them both well. PICC okay to use per MD order. Pt not in room for frequent vital signs after RONEL or PICC placement. Up independently, ambulating in halls. Educated on risks of leaving room after receiving sedation with RONEL. Pt aware of risks and left floor to walk. Oxycodone and tylenol given for pain with good relief. Started TPN/Lipids tonight and pt ela well. Plan to discharge home tomorrow morning. Cont with poc.  
Status: Ready for discharge.  VS: VSS.   Pain: Complaints of generalized pain, oxycodone 15mg administered with some relief.   Neuro: Pt alert and oriented x4.   Respiratory: Breathing adequately on RA.   Skin: G tube on stomach, WDL.   IV/Drains: PICC line placed yesterday, dressing changed prior to discharge. TPN and lipids per home regimen.  GI/: No BM this shift. Passing flatus. Voiding appropriately.   Diet: Regular diet, poor appetite.   Activity: Up independently in room. Ambulates frequently.   Plan of care: Discharging home with wife. Discharge instructions given.                 Revision History      
VSS on RA. A&Ox4, but speech is slow. Pain managed w/ PRN oxycodone, given x2 on shift and tylenol x1, last given at 1030. Pt refusing tylenol for rest of shift to monitor temperature trending. Nausea managed w/ PRN zofran, last given at 1030. Sucralfate given x1 at 1430 for GI pain. TPN running through RPIV at 110 ml/hr. LPIV kept TKO throughout shift. BG well managed, 110 at 1200 check. Potassium was 3.6 this AM. Replaced w/ 20 meq potassium IV, labs to be rechecked tomorrow AM. Ambulated around unit several times during shift. Wife at bedside and attentive to pt throughout shift. Will continue to monitor and follow POC.  
VSS on RA. A&Ox4. Pain well managed w/ PRN oxycodone, given x2 on shift, and PRN tylenol given x2 on shift. Intermittent nausea managed w/ PRN zofran, given x1 on shift. Appetite is poor. Potassium this AM was 3.8, replaced w/ 20 meq of IV potassium. Was tolerating PPN at 110 ml/hr throughout shift. New bag hung at 1330 and stopped at 1340 as PIV site infiltrated and pt refused vascular consult. Team aware and stated PPN could be discontinued for 1-2 days. BG stable throughout shift, 102 at 1200 check. Pt complaining of back pain, abdominal MRI scheduled to view renal status. Pt left for imaging at 1500 via wheelchair. Will continue to monitor and follow POC.  
VSS, no acute events this shift. Complaints of chronic abdominal and back pain, oxycodone administered x1 with good relief. Pt alert and oriented x4. Breathing adequately on RA. Skin appears WDL, G tube site WDL on abdomen (pt manages well on own). PIV removed by nurse at 0645 as it appeared pt had pulled off tape and IV catheter was visibly dislodged. Pt to have PICC line placed today. No BM this shift & voiding appropriately per pt report. Regular diet. Up independently. Continue to monitor and follow POC.   
Feroz

## 2019-01-09 NOTE — PROGRESS NOTES
Care Coordinator - Discharge Planning    Admission Date/Time:  1/4/2019  Attending MD:  Cecilia Bee,*     Data  Date of initial CC assessment:    Chart reviewed, discussed with interdisciplinary team.   Patient was admitted for:   1. Infection by Candida species    2. Candidiasis    3. Gastric bypass status for obesity    4. Weight loss, non-intentional    5. Fungemia    6. Short gut syndrome    7. S/P bariatric surgery    8. Bacteremia         Assessment   Full assessment completed in previous note  Per discussion with Dr. Perez pt cleared to discharge home today.  TPN script faxed to [facility].  Paged Tatum Sibley [facility] RN Liaison with update.  Reviewed/discussed with CHRISTY Brooks.      Coordination of Care and Referrals: Provided patient/family with options for Home Infusion.      Plan  Anticipated Discharge Date:  1/9/19  Anticipated Discharge Plan:  Home with home infuison    CTS Handoff completed:  YES      Genesis Rivas RN BSN, PHN RN Care Coordinator  Medicine Teams: Gold 1; Gold 2; ED/Observation   [phone]  Pager: [phone]  Weekend RN Care Coordinator job code * * * [id number]  1/9/2019 10:06 AM

## 2019-01-09 NOTE — PROGRESS NOTES
"[facility], [place]   Internal Medicine Daily Note           Interval History/Events     Hand off taken from Dr. Alvarez  Overnight events reviewed  Reports he is doing fine  Would like to be discharged at 10 AM in the morning. I said I will try my best.   He denies any pain issues. He reports of no fever.   Denies any nausea, vomiting           Review of Systems        4 point ROS including Respiratory, CV, GI and , other than that noted above is negative      Medications   I have reviewed current medications  in the \"current medication\" section of Epic.  Relevant changes include:     Physical Exam   General:       Vital signs:    Blood pressure 117/72, pulse 79, temperature 98.1  F (36.7  C), temperature source Oral, resp. rate 16, height 1.816 m (5' 11.5\"), weight 88.4 kg (194 lb 12.8 oz), SpO2 96 %.  Estimated body mass index is 26.79 kg/m  as calculated from the following:    Height as of this encounter: 1.816 m (5' 11.5\").    Weight as of this encounter: 88.4 kg (194 lb 12.8 oz).    No intake or output data in the 24 hours ending 01/08/19 1817   HEENT: No icterus, no pallor  Cardiovascular: S1, S2 normaal  Respiratory:  B/L CTA  GI/Abdomen: Soft, NT, BS+. G tube site secure  Neurology: Alert, awake,and oriented. No tremors.   Extremities: No pretibial edema.   Skin:      Laboratory and Imaging Studies     I have reviewed  laboratory and imaging studies in the Epic. Pertinent findings are as below:    BMP  Recent Labs   Lab 01/07/19  0530 01/06/19  0522 01/05/19  0516 01/04/19  1649 01/04/19  0543    140 138  --  141   POTASSIUM 3.6 3.8  3.8 3.6 3.5 3.1*   CHLORIDE 105 109 107  --  107   SILAS 8.5 8.1* 8.1*  --  8.2*   CO2 27 26 25  --  28   BUN 12 12 11  --  11   CR 0.67 0.65* 0.64*  --  0.71   GLC 86 100* 118*  --  102*     CBC  Recent Labs   Lab 01/08/19  0543 01/07/19  0530 01/06/19  0522 01/05/19  0516   WBC 6.1 6.2 5.7 5.6   RBC 3.85* 4.15* 3.93* 4.01*   HGB 12.0* " 13.0* 12.3* 12.4*   HCT 38.1* 41.1 38.8* 39.2*   MCV 99 99 99 98   MCH 31.2 31.3 31.3 30.9   MCHC 31.5 31.6 31.7 31.6   RDW 14.0 14.0 13.9 13.6    145* 123* 115*     INR  Recent Labs   Lab 01/07/19  0530 01/05/19  0516   INR 1.14 1.18*     LFTs  Recent Labs   Lab 01/07/19  0530 01/05/19  0516 01/04/19  0249 01/02/19  1548   ALKPHOS 82 71 80 73   AST 18 20 16 20   ALT 26 26 26 22   BILITOTAL 0.4 0.4 0.4 0.5   PROTTOTAL 7.2 6.6* 7.2 6.9   ALBUMIN 3.4 3.0* 3.7 3.5      PANCNo lab results found in last 7 days.        Impression/Plan           Assessment & Plan        Parker Richflorentino Elizabeth. is a 48 year old male admitted on 1/4/2019. He has a history of Celestino-en-Y gastric bypass, esophagojejunostromy, and chronic malnutrition on TPN with recurrent bacteriemia and is admitted for fevers, chills, and blood cultures positive for yeast.     # Sepsis from Candidemia c/b chorioretinitis due to PICC line infection iso chronic TPN  Patient comes to the ED after blood cultures taken at home came back positive for yeast. He has been having fevers, chills, diaphoresis, myalgia, rash, and weakness since December 25, 2018. In the ED, patient was started on micafungin and repeat blood cultures were drawn. He has a PICC through which he receives TPN; this is his most likely source of infection. TPN is a major risk factor for candidiasis. He did not have a leukocytosis. He has a PICC in place, but in the past he has had multiple ports.    Switched to 400mg IV Fluconazole on 1/5 per optho recs, per ID on 1/6, OK to continue, on 1/7, we are adding Micafungin back given potential Fluconazole resistance. - ECHO w no endocarditis.    - discontinue Micanfungin IV, and Fluconazole IV. Start Fluconazole 400 mg PO daily  - RONEL today afternoon.   Discussed with ID, Cardiology and OR team on 01/08             # Chronic Malnutrition on TPN  Patient only able to take small bites of food. Otherwise he has severe nausea and vomiting.   - Seen  by bariatric service, ordered bariatric lab panel. Patient does not wish to do GJ tube  - Meanwhile, continue peripheral nutrition & PO  - He is not taking PPN until we place PICC line as PPN bothers his peripheral IV sites causing discomfort. Orders dc'ed for now  - PICC line placement on 01/08.   - Pharmacy consult to start TPN  - Ondansetron for nausea/ vomiting      # Anxiety   # Attention Deficit Hyperactivity Disorder   Takes lorazepam before bed every night   - Continue PTA lorazepam  - Continue PTA amphetamine-dextroamphetamine (Adderall)     # Chronic Pain  Patient has chronic abdominal pain  - Continue PTAoxycodone     # History of Cardiomyopathy  - Currently holding PTA carvedilol in the setting of blood stream infection and risk for sepsis.      Diet: Room Service  Regular Diet Adult    DVT Prophylaxis: Low Risk/Ambulatory with no VTE prophylaxis indicated  Sanchez Catheter: not present  Code Status: Full Code            Disposition Plan     Expected discharge: 1-2 days, recommended to prior living arrangement once adequate pain management/ tolerating PO medications                      # FEN:   # Activity/Physical condition:   # Prophylaxis:   # Social Issues:   # Code status:     Disposition Plan   Expected discharge in 1-2 days to prior living arrangement once plan formalized.     Entered: Shane Kong 01/08/2019, 6:17 PM            Pt's care was discussed with bedside RN, patient and  during Care Team Rounds.               Shane Kong MD  Hospitalist ( Internal medicine)  Pager: [phone]

## 2019-01-10 ENCOUNTER — PATIENT OUTREACH (OUTPATIENT)
Dept: CARE COORDINATION | Facility: CLINIC | Age: 49
End: 2019-01-10

## 2019-01-10 ENCOUNTER — HOME INFUSION (PRE-WILLOW HOME INFUSION) (OUTPATIENT)
Dept: PHARMACY | Facility: CLINIC | Age: 49
End: 2019-01-10

## 2019-01-10 ENCOUNTER — TRANSFERRED RECORDS (OUTPATIENT)
Dept: HEALTH INFORMATION MANAGEMENT | Facility: CLINIC | Age: 49
End: 2019-01-10

## 2019-01-10 DIAGNOSIS — F41.9 ANXIETY: ICD-10-CM

## 2019-01-10 LAB
ALT SERPL-CCNC: 29 U/L (ref 8–45)
AST SERPL-CCNC: 34 U/L (ref 5–41)
CREAT SERPL-MCNC: 0.7 MG/DL (ref 0.72–1.25)
GFR SERPL CREATININE-BSD FRML MDRD: >60 ML/MIN/1.73M2
GLUCOSE SERPL-MCNC: 87 MG/DL (ref 70–100)
POTASSIUM SERPL-SCNC: 3.9 MMOL/L (ref 3.5–5.1)
TRIGL SERPL-MCNC: 84 MG/DL (ref 30–150)

## 2019-01-10 ASSESSMENT — ACTIVITIES OF DAILY LIVING (ADL): DEPENDENT_IADLS:: MEDICATION MANAGEMENT;TRANSPORTATION;SHOPPING

## 2019-01-10 NOTE — TELEPHONE ENCOUNTER
Lorazepam 1 MG       Last Written Prescription Date:  12/14/18  Last Fill Quantity: 50,   # refills: 0  Last Office Visit: 10/30/18  Future Office visit:    Next 5 appointments (look out 90 days)    Feb 06, 2019  1:00 PM CST  Return Visit with Patti Milligan MD  [facility] [place] ([facility]) [address]  [place] [address]  [phone]           Routing refill request to provider for review/approval because:  Drug not on the [facility], [facility] or  Health refill protocol or controlled substance

## 2019-01-10 NOTE — LETTER
[facility]  Complex Care Plan  About Me  Patient Name:  Parker Acevedo Sr.    YOB: 1970  Age:     48 year old   [place] MRN:   [id number] Telephone Information:  Home Phone [phone]   Mobile Not on file.       Address:    [address] [place] [address] [place] [address] Email address:  [email or web address]      Emergency Contact(s)  Name Relationship Lgl Grd Work Phone Home Phone Mobile Phone   1. BERTRAND RAMOS* Spouse No  [phone]   2. JEYSON CAIN * Relative No  [phone]   3. CHARLI ACEVEDO* Mother No  [phone]           Primary language:  English     needed? No   [place] [facility]:  [phone] op. 1  Other communication barriers: Glasses, Physical impairment  Preferred Method of Communication:  Chris  Current living arrangement: I live in a private home with spouse  Mobility Status/ Medical Equipment: Independent    Health Maintenance  Health Maintenance Reviewed: Due/Overdue   Health Maintenance Due   Topic Date Due     LIPID MONITORING Q1 YEAR  05/04/2017     DTAP/TDAP/TD IMMUNIZATION (8 - Td) 01/23/2019     TOBACCO CESSATION COUNSELING Q1 YR  02/06/2019        My Access Plan  Medical Emergency 911   Primary Clinic Line [facility] [phone]   24 Hour Appointment Line [phone] or  [phone]) (toll-free)   24 Hour Nurse Line [phone] (toll-free)   Preferred Urgent Care Other   Preferred Hospital [facility]  [phone]   Preferred Pharmacy [facility] [address]     Behavioral Health Crisis Line The National Suicide Prevention Lifeline at 1-364.621.5802 or 911     My Care Team Members    Patient Care Team       Relationship Specialty Notifications Start Chuy Torres MD PCP - General Internal Medicine  10/30/18     Phone: [phone] Fax: [phone] [address]  [place] [address]    Chuy Isaac MD PCP - Assigned PCP   11/11/18     Phone: [phone] Fax: [phone]         [address] [facility] [address]    Francis Vyas MD Referring Physician Surgery  4/9/15     GI     Phone: [phone] Fax: [phone]         [address]  [facility] [address]    Esteban Daly MD Referring Physician Family Practice  4/9/15     Phone: [phone] Fax: [phone]         [address] [facility] [address]    Esteban Daly MD  Family [facility]  6/2/15     Merged    Phone: [phone] Fax: [phone]         [address] [facility] [address]    Bill Brumfield MD MD Gastroenterology  6/2/15     Phone: [phone] Fax: [phone]         [address] [facility] [address]    Patti Milligan MD MD Pain Clinic  6/2/15     Phone: [phone] Fax: [phone]         [address] [address] [facility] [address]    Behl, Melissa K, RN Lead Care Coordinator Primary Care - CC Admissions 3/28/18     Phone: [phone] Fax: [phone]        [facility] [place] [facility] [place]  8/9/18     Phone: [phone]         [facility] [place] [place]  8/10/18     Phone: [phone]                 My Care Plans  Self Management and Treatment Plan  Goals and (Comments)  Goals        General    #1 Medical (pt-stated)     Notes - Note edited  1/10/2019  2:00 PM by Behl, Melissa K, RN    Goal Statement: Spouse will schedule a hospital f/u with PCP within 7 days of discharge.  Measure of Success: Patient will schedule and attend hospital follow up with PCP.   Supportive Steps to Achieve: RN CC reviewed hospital discharge instructions.  Barriers: multiple appointments  Strengths: spouse supportive, care coordinatino  Date to Achieve By: 1/18/19  Patient expressed understanding of goal: yes         #2 Medical (pt-stated)     Notes - Note edited  1/10/2019  1:58 PM  by Behl, Melissa K, RN    Goal Statement: Spouse will schedule ophthalmology appointment for 1/14/19.  Measure of Success: Patient will schedule and attend ophthalmology appointment for 1/14/19.  Supportive Steps to Achieve: RN CC reviewed hospital discharge instructions and provided spouse with ophthalmology [facility] scheduling number [phone].  Barriers: multiple appointments, [facility]   Strengths: supportive spouse, care coordination  Date to Achieve By: 1/18/19  Patient expressed understanding of goal: yes         #3 Medical (pt-stated)     Notes - Note created  1/10/2019  2:00 PM by Behl, Melissa K, RN    Goal Statement: Spouse will schedule a follow up appointment with bariatric surgery.  Measure of Success: Patient will schedule and attend follow up appointment with bariatric surgeon Dr. Vyas.  Supportive Steps to Achieve: Spouse has phone number, reviewed discharge instructions.  Barriers: multiple appointments, distance from clinic  Strengths: supportive spouse, care coordination  Date to Achieve By: 2/10/19  Patient expressed understanding of goal: yes         #4 Medical (pt-stated)     Notes - Note created  1/10/2019  2:01 PM by Behl, Melissa K, RN    Goal Statement: Spouse will schedule follow up with infectious disease provider.  Measure of Success: Patient will schedule and attend infectious disease appointment.  Supportive Steps to Achieve: Reviewed hospital discharge instructions, spouse has phone number.  Barriers: multiple appointments, distance from clinic.  Strengths: supportive spouse, care coordination  Date to Achieve By: 2/10/19  Patient expressed understanding of goal: yes                Action Plans on File:                       Advance Care Plans/Directives Type:   Type Advanced Care Plans/Directives: Advanced Directive - On File    My Medical and Care Information  Problem List   Patient Active Problem List   Diagnosis     Peptic ulcer disease     Gastric bypass status for  obesity     Chronic abdominal pain     Vomiting     Anemia     ADHD (attention deficit hyperactivity disorder), inattentive type     Vitamin B12 deficiency without anemia     Thiamine deficiency     Dehydration     Dysphagia     Weight loss, non-intentional     Malnutrition (H)     Chronic anxiety     Constipation     Bile reflux esophagitis     Former smoker     Vitamin D deficiency     Iron deficiency     Health Care Home     Chronic pain     Insomnia     Positive blood culture     Fungemia     Chronic nausea     Gastrostomy tube in place (H)     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)     Short gut syndrome     Anxiety     Status post cervical spinal arthrodesis     Port or reservoir infection, initial encounter     Abnormal echocardiogram     Low serum iron     Anemia, iron deficiency     Catheter-related bloodstream infection (CRBSI)     Generalized weakness     S/P bariatric surgery     Hyperlipidemia LDL goal <130     Bacteremia     Infection by Candida species      Current Medications and Allergies:  See printed Medication Report.    Care Coordination Start Date: 1/10/2019   Frequency of Care Coordination: weekly   Form Last Updated: 01/10/2019

## 2019-01-10 NOTE — PROGRESS NOTES
This is a recent snapshot of the patient's [facility] medical record.  For current drug dose and complete information and questions, call [phone] or In Basket pool, [facility]v home infusion ([id number])  CSN Number:  [id number]

## 2019-01-10 NOTE — PROGRESS NOTES
Clinic Care Coordination Contact    Clinic Care Coordination Contact  OUTREACH    Referral Information:  Referral Source: IP Report    Primary Diagnosis: SIRS/Sepsis    Chief Complaint   Patient presents with     Clinic Care Coordination - Post Hospital     RN        Universal Utilization: Patient is followed Infectious Disease, Pain Management and Bariatric Surgery  Clinic Utilization  Difficulty keeping appointments:: No  Compliance Concerns: Yes  No-Show Concerns: No  No PCP office visit in Past Year: No  Utilization    Last refreshed: 1/10/2019 11:43 AM:  Hospital Admissions 4           Last refreshed: 1/10/2019 11:43 AM:  ED Visits 4           Last refreshed: 1/10/2019 11:43 AM:  No Show Count (past year) 2              Current as of: 1/10/2019 11:43 AM              Clinical Concerns:  Current Medical Concerns:  Patient was inpatient at [facility] 1/4/19-1/9/19 for infection by Candida species.  Patient was seen by infectious disease, ophthalmology and surgery.  Patient has a PICC placed and will be receiving home infusion services from [facility].  Spouse reports patient was found to have rheumatoid arthritis and will be following up with pain management.  Patient has been afebrile since home.  Patient will need to schedule follow up appointments with PCP, ophthalmology, bariatric surgery and infectious disease.  Patient has had a difficult time in scheduling appointments in the past.  Spouse made a goal to schedule these for patient.  Patient Active Problem List   Diagnosis     Peptic ulcer disease     Gastric bypass status for obesity     Chronic abdominal pain     Vomiting     Anemia     ADHD (attention deficit hyperactivity disorder), inattentive type     Vitamin B12 deficiency without anemia     Thiamine deficiency     Dehydration     Dysphagia     Weight loss, non-intentional     Malnutrition (H)     Chronic anxiety     Constipation     Bile reflux esophagitis     Former smoker     Vitamin D deficiency      Iron deficiency     Health Care Home     Chronic pain     Insomnia     Positive blood culture     Fungemia     Chronic nausea     Gastrostomy tube in place (H)     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)     Short gut syndrome     Anxiety     Status post cervical spinal arthrodesis     Port or reservoir infection, initial encounter     Abnormal echocardiogram     Low serum iron     Anemia, iron deficiency     Catheter-related bloodstream infection (CRBSI)     Generalized weakness     S/P bariatric surgery     Hyperlipidemia LDL goal <130     Bacteremia     Infection by Candida species      Current Behavioral Concerns: Patient has diagnoses of ADHD and anxiety managed by PCP.    Education Provided to patient: RN CC reviewed hospital discharge instructions and reviewed contacted phone numbers for needed appointments.     Pain  Pain (GOAL):: Yes  Type: Chronic (>3mo)  Location of chronic pain:: chronic abdominal pain and lower back pain  Radiating: Yes  Progression: Waxing and Waning  Chronic pain severity:: 7  Limitation of routine activities due to chronic pain:: Yes  Description: Unable to perform most daily activities (chores, hobbies, social activities, driving)  Alleviating Factors: Pain Medication, Rest, Heat  Health Maintenance Reviewed: Due/Overdue   Health Maintenance Due   Topic Date Due     LIPID MONITORING Q1 YEAR  05/04/2017     DTAP/TDAP/TD IMMUNIZATION (8 - Td) 01/23/2019     TOBACCO CESSATION COUNSELING Q1 YR  02/06/2019      Clinical Pathway: None    Medication Management:  Spouse manages patient's medication and reports patient is taking new medication fluconazole as prescribed 2 times/daily.     Functional Status:  Dependent ADLs:: Ambulation-cane, Bathing  Dependent IADLs:: Medication Management, Transportation, Shopping  Bed or wheelchair confined:: No  Mobility Status: Independent    Living Situation:  Current living arrangement:: I live in a private home with spouse  Type of residence::  Private home - stairs    Diet/Exercise/Sleep:  Diet:: Regular(But also has TPN)  Inadequate nutrition (GOAL):: No  Food Insecurity: No  Tube Feeding: No  Exercise:: Yes  Days per week of moderate to strenuous exercise (like a brisk walk): 5  On average, minutes per day of exercise at this level: 10  How intense was your typical exercise? : Light (like stretching or slow walking)  Exercise Minutes per Week: 50  Inadequate activity/exercise (GOAL):: No  Significant changes in sleep pattern (GOAL): No    Transportation:  Transportation concerns (GOAL):: No  Transportation means:: Regular car     Psychosocial:  [place]ze or spiritual beliefs that impact treatment:: No  Mental health DX:: Yes  Mental health DX how managed:: Medication  Mental health management concern (GOAL):: No  Informal Support system:: Family, Spouse     Financial/Insurance:   Financial/Insurance concerns (GOAL):: No       Resources and Interventions:  Current Resources:   List of home care services:: Skilled Nursing;   Community Resources: Home Infusion, Home Care  Supplies used at home:: None  Equipment Currently Used at Home: cane, straight, shower chair    Advance Care Plan/Directive  Advanced Care Plans/Directives on file:: Yes  Type Advanced Care Plans/Directives: Advanced Directive - On File  Advanced Care Plan/Directive Status: Not Applicable    Referrals Placed: None     Goals:   Goals        General    #1 Medical (pt-stated)     Notes - Note edited  1/10/2019  2:00 PM by Behl, Melissa K, RN    Goal Statement: Spouse will schedule a hospital f/u with PCP within 7 days of discharge.  Measure of Success: Patient will schedule and attend hospital follow up with PCP.   Supportive Steps to Achieve: RN CC reviewed hospital discharge instructions.  Barriers: multiple appointments  Strengths: spouse supportive, care coordinatino  Date to Achieve By: 1/18/19  Patient expressed understanding of goal: yes         #2 Medical (pt-stated)     Notes - Note  edited  1/10/2019  1:58 PM by Behl, Melissa K, RN    Goal Statement: Spouse will schedule ophthalmology appointment for 1/14/19.  Measure of Success: Patient will schedule and attend ophthalmology appointment for 1/14/19.  Supportive Steps to Achieve: RN CC reviewed hospital discharge instructions and provided spouse with ophthalmology [facility] scheduling number [phone].  Barriers: multiple appointments, distance from [facility]   Strengths: supportive spouse, care coordination  Date to Achieve By: 1/18/19  Patient expressed understanding of goal: yes         #3 Medical (pt-stated)     Notes - Note created  1/10/2019  2:00 PM by Behl, Melissa K, RN    Goal Statement: Spouse will schedule a follow up appointment with bariatric surgery.  Measure of Success: Patient will schedule and attend follow up appointment with bariatric surgeon Dr. Vyas.  Supportive Steps to Achieve: Spouse has phone number, reviewed discharge instructions.  Barriers: multiple appointments, distance from clinic  Strengths: supportive spouse, care coordination  Date to Achieve By: 2/10/19  Patient expressed understanding of goal: yes         #4 Medical (pt-stated)     Notes - Note created  1/10/2019  2:01 PM by Behl, Melissa K, RN    Goal Statement: Spouse will schedule follow up with infectious disease provider.  Measure of Success: Patient will schedule and attend infectious disease appointment.  Supportive Steps to Achieve: Reviewed hospital discharge instructions, spouse has phone number.  Barriers: multiple appointments, distance from clinic.  Strengths: supportive spouse, care coordination  Date to Achieve By: 2/10/19  Patient expressed understanding of goal: yes                 Patient/Caregiver understanding: Patient and spouse had good understanding of current plan of care.    Outreach Frequency: weekly  Future Appointments              In 3 weeks Patti Milligan MD [facility] [place]          Plan:   1.  Patient and spouse will schedule appointments with PCP, ophthalmology, bariatric surgery and infectious disease.  2. Patient will continue to follow treatment plan as directed and follow up with PCP with concerns ongoing.    3. RN CC will send patient updated Complex Care Plan via [facility].  4. RN CC will follow up with patient and spouse in 1-2 weeks.    Melissa Behl BSN, RN, N  [facility] Care Coordinator  [phone]

## 2019-01-10 NOTE — DISCHARGE SUMMARY
Discharge Summary    Parker Acevedo Sr. MRN# [id number]   YOB: 1970 Age: 48 year old     Date of Admission:  1/4/2019  Date of Discharge:  1/9/2019 12:04 PM  Admitting Physician:  Cecilia Bee MD  Discharge Physician:  Shane Kong MD   Discharging Service:  Internal Medicine     Primary Provider: Chuy Isaac          Discharge Diagnosis:     Sepsis from candidemia  Severe malnutrition in the context of chronic condition   Anxiety  ADHD                Brief History of Illness:     Parker Acevedo Sr. is a 48 year old male who was admitted for fever    For more details, please refer to the admission H&P on 1/4/2019             Hospital Course:     Parker Acevedo Sr. is a 48 year old male admitted on 1/4/2019. He has a history of Celestino-en-Y gastric bypass, esophagojejunostromy, and chronic malnutrition on TPN with recurrent bacteriemia and is admitted for fevers, chills, and blood cultures positive for yeast.     # Sepsis from Candidemia c/b chorioretinitis due to PICC line infection iso chronic TPN  Patient comes to the ED after blood cultures taken at home came back positive for yeast. He has been having fevers, chills, diaphoresis, myalgia, rash, and weakness since December 25, 2018. In the ED, patient was started on Micafungin and repeat blood cultures were drawn. He has a PICC through which he receives TPN; this is his most likely source of infection. TPN is a major risk factor for candidiasis. He did not have a leukocytosis. He has a PICC in place, but in the past he has had multiple ports.   He was switched to 400mg IV Fluconazole on 1/5 per optho recs, per ID on 1/6, OK to continue. on 1/7, Micafungin was added for possible  Fluconazole resistance. ECHO w no endocarditis.  Candida was sensitive to Fluconazole. RONEL was done on 01/08 which did not show any vegetation. PICC line was placed after okayed by ID on 01/08  Micafungin, and Fluconazole IV was  discontinued. He was started on Fluconazole 400 mg PO daily to complete 14 day course  - Fluconazole for 10 day to complete 14 days treatment.   - Follow up with primary care in one week           # Chronic Malnutrition on TPN,   Severe malnutrition in the context of chronic condition   Patient only able to take small bites of food. Otherwise he has severe nausea and vomiting.   Seen by bariatric service, ordered bariatric lab panel. Patient does not wish to do GJ tube   PICC line placement on 01/08.  Pharmacy consulted to start TPN  - Continue TPN   - Follow up with Bariatric surgery in 2-3 weeks.   - Ondansetron for nausea/ vomiting      # Anxiety   # Attention Deficit Hyperactivity Disorder   Takes lorazepam before bed every night   - Continue PTA lorazepam  - Continue PTA amphetamine-dextroamphetamine (Adderall)     # Chronic Pain  Patient has chronic abdominal pain  - Continue PTAoxycodone     # History of Cardiomyopathy  - Currently holding PTA carvedilol in the setting of blood stream infection and risk for sepsis.   Okay to start on discharge.      Diet: Room Service  Regular Diet Adult    DVT Prophylaxis: Low Risk/Ambulatory with no VTE prophylaxis indicated  Sanchez Catheter: not present  Code Status: Full Code          Discharge Medications:     Discharge Medication List as of 1/9/2019 10:55 AM      CONTINUE these medications which have CHANGED    Details   fluconazole (DIFLUCAN) 200 MG tablet Take 2 tablets (400 mg) by mouth daily for 10 days, Disp-20 tablet, R-0, E-Prescribe         CONTINUE these medications which have NOT CHANGED    Details   acetaminophen (TYLENOL) 32 mg/mL liquid Take 500 mg by mouth every 4 hours as needed for fever or mild pain, Historical      albuterol (PROAIR HFA/PROVENTIL HFA/VENTOLIN HFA) 108 (90 Base) MCG/ACT Inhaler Inhale 2 puffs into the lungs every 4 hours as needed for shortness of breath / dyspnea or wheezing, Disp-1 Inhaler, R-3, E-Prescribe     "  amphetamine-dextroamphetamine (ADDERALL) 20 MG tablet Take 1 tablet (20 mg) by mouth daily, Disp-30 tablet, R-0, Local Print      B-D TB SYRINGE 27G X 1/2\" 1 ML MISC CARLOS, Historical      carvedilol (COREG) 6.25 MG tablet Take 6.25 mg by mouth 2 times daily (with meals), Historical      cyanocobalamin (VITAMIN B12) 1000 MCG/ML injection Inject 1 mL (1,000 mcg) into the muscle every 30 days, Disp-1 mL, R-11, E-Prescribe      [facility] INFUSION MANAGED PATIENT Contact [facility] for patient specific medication information at [phone] on admission and discharge from the hospital.  Phones are answered 24 hours a day 7 days a week 365 days a year.    Providers - Choose \"CONTINUE HOME MED (no scr ipt)\" at discharge if patient treatment with home infusion will continue., No Print OutResume prior to admission TPN/Lipids/saline      lidocaine (LIDODERM) 5 % Patch Place 1-2 patches onto the skin every 24 hours Wear for 12 hours, remove for 12 hours.  OK to cut to better fit to size.Disp-60 patch, [id number]      LORazepam (ATIVAN) 1 MG tablet 1-2 mg as needed for anxiety with TPN and medications, Disp-50 tablet, R-0, Local Print      Needle, Disp, (BD DISP NEEDLES) 27G X 1/2\" MISC 1 Device every 30 days Use for cyanocobalamin injection once q 30 days., Disp-3 each, R-4, E-Prescribe      ondansetron (ZOFRAN-ODT) 8 MG ODT tab Take 1 tablet (8 mg) by mouth every 8 hours as needed for nausea, Disp-90 tablet, R-1, E-Prescribe      !! order for DME Equipment being ordered: Bilateral knee high chronic venous insufficiency stockings--  mild-moderate pressures.Disp-4 each, R-5, Local Print      !! order for DME Injection Supplies for Vitamin B12: 3cc syringes w/ 27 gauge needles, 1/2 inch lengthDisp-12 each, R-0, Local Print      oxyCODONE (ROXICODONE) 5 MG/5ML solution Take 10-15 mLs (10-15 mg) by mouth every 4 hours as needed for moderate to severe pain Max of 60 mg/day. Fill on/after 12/27/18 not to start " "untill 12/29/18., Disp-1800 mL, R-0, Local Print      sodium chloride 0.9% infusion Inject 1,000-2,000 mLs into the vein as needed , Historical      sucralfate (CARAFATE) 1 GM/10ML suspension Take 10 mLs (1 g) by mouth 4 times daily, Disp-1200 mL, R-11, E-Prescribe      vitamin D (ERGOCALCIFEROL) 46098 UNIT capsule TAKE 1 CAPSULE BY MOUTH EVERY 7 DAYS, Disp-12 capsule, R-3, E-Prescribe       !! - Potential duplicate medications found. Please discuss with provider.      STOP taking these medications       LORazepam (ATIVAN) 1 MG tablet Comments:   Reason for Stopping:         NARCAN 4 MG/0.1ML nasal spray Comments:   Reason for Stopping:                   Procedures/Consultations:   RONEL on 01/08/2018  Consultation during this admission received from infectious disease           Final Day of Progress before Discharge:     Reports doing well  No pain issues  No fever, chills  Eager to get discharged  Mild nausea (nothing new)  No vomiting    Physical Exam:  Blood pressure 123/81, pulse 72, temperature 98.1  F (36.7  C), temperature source Oral, resp. rate 18, height 1.816 m (5' 11.5\"), weight 88 kg (194 lb), SpO2 98 %.      HEENT: No icterus, no pallor  Cardiovascular: S1, S2 normaal  Respiratory:  B/L CTA  GI/Abdomen: Soft, NT, BS+. G tube site secure  Neurology: Alert, awake,and oriented. No tremors.   Extremities: No pretibial edema.   Skin:     Data:  All laboratory data reviewed             Condition on Discharge:   Discharge condition: Stable   Code status on discharge: Full Code                Discharge Disposition:   Discharged to home               Pending Results:   Unresulted Labs Ordered in the Past 30 Days of this Admission     Date and Time Order Name Status Description    1/7/2019 1942 Blood culture Preliminary     1/7/2019 1942 Blood culture Preliminary     1/6/2019 0906 Blood culture Preliminary     1/6/2019 0906 Blood culture Preliminary                   Discharge Instructions and Follow-Up: "     Discharge Procedure Orders   Home infusion referral   Number of Visits Requested: 1     Home care nursing referral   Referral Type: Home Health Therapies & Aides   Number of Visits Requested: 1     Medication Therapy Management Referral   Referral Type: Med Therapy Management   Number of Visits Requested: 1     Reason for your hospital stay   Order Comments: Admitted for Fungal bacteremia     Adult [facility] Follow-up and recommended labs and tests   Order Comments: Follow up with primary care provider, Chuy Isaac, within 7 days for hospital follow- up.  The following labs/tests are recommended: CBC, BMP.      Appointments on [place] and/or [facility] (with [facility] or [facility] provider or service). Call [phone] if you haven't heard regarding these appointments within 7 days of discharge.     Activity   Order Comments: Your activity upon discharge: activity as tolerated     Order Specific Question Answer Comments   Is discharge order? Yes      Monitor and record   Order Comments: Watch for fever, chills, nausea, vomiting, chest pain, shortness of breath, lightheadedness or dizziness  If these symptoms occurs, please call your primary care or come to ED     Full Code     Order Specific Question Answer Comments   Code status determined by: Discussion with patient/legal decision maker      Diet   Order Comments: Follow this diet upon discharge: Orders Placed This Encounter      Room Service      Regular Diet Adult     Order Specific Question Answer Comments   Is discharge order? Yes           Attestation:  Shane Kong.    Time spent on patient: 55 minutes total including face to face and coordinating care time reviewing current illness, any medication changes, and the care plan for today.

## 2019-01-11 ENCOUNTER — TELEPHONE (OUTPATIENT)
Dept: INTERNAL MEDICINE | Facility: CLINIC | Age: 49
End: 2019-01-11

## 2019-01-11 ENCOUNTER — TELEPHONE (OUTPATIENT)
Dept: OPHTHALMOLOGY | Facility: CLINIC | Age: 49
End: 2019-01-11

## 2019-01-11 RX ORDER — LORAZEPAM 1 MG/1
TABLET ORAL
Qty: 50 TABLET | Refills: 0 | Status: SHIPPED | OUTPATIENT
Start: 2019-01-11 | End: 2019-02-06

## 2019-01-11 NOTE — TELEPHONE ENCOUNTER
"Rx for Lorazepam has directions of \"Take 1 to 2 tablets prn anxiety with TPN and Medications\".  Qty 50    We have been entering this as a 25 days supply.  How many tabs per day is he allowed to take?     He last picked this up on 12/26/18, so 25 days later is 1/20/19.  Per our protocol only fill max of 2 days early from the due date, so could fill this on 1/18/19.    If you ok him to take more than 2 tabs per day we can adjust our days supply.    Please verify.    Thank you   -Fany John, Pharm.D., [facility], [phone]  "

## 2019-01-11 NOTE — TELEPHONE ENCOUNTER
Pt to f/u with eye clinic one week after last visit 1-14-19  Pt was to be on IV antifungal medication    Called to assist in scheduled exam and review if pt continuing with IV medication (fluconazole)    No anwswer 1-11-19 at 1355 and left message with direct triage number    Rene Don RN 1:57 PM 01/11/19

## 2019-01-12 ENCOUNTER — HOME INFUSION (PRE-WILLOW HOME INFUSION) (OUTPATIENT)
Dept: PHARMACY | Facility: CLINIC | Age: 49
End: 2019-01-12

## 2019-01-12 ENCOUNTER — TELEPHONE (OUTPATIENT)
Dept: INFECTIOUS DISEASES | Facility: CLINIC | Age: 49
End: 2019-01-12

## 2019-01-12 DIAGNOSIS — B37.7 CANDIDEMIA (H): Primary | ICD-10-CM

## 2019-01-12 RX ORDER — FLUCONAZOLE 200 MG/1
800 TABLET ORAL DAILY
Qty: 40 TABLET | Refills: 0 | Status: SHIPPED | OUTPATIENT
Start: 2019-01-12 | End: 2019-03-11

## 2019-01-13 NOTE — TELEPHONE ENCOUNTER
1/12/2019 1900    Infectious Diseases Telephone Note:     I was called this evening by Parker and Rose due to concerns about absorption of his oral fluconazole which is being used to treat PICC/TPN related candidemia.Parker reported that he had been feeling well while in the hospital on IV fluconazole but within 24 hours of transitioning to an oral formulation starting having fatigue and low-grade fevers again. His highest temp has been 99.8 and he notes that he was on acetaminophen at the time. Both Parker and Rose are certain that he has had a clinical change since discharge. He has had normal LFTs and a normal QTc at last check. Therefore, will increase fluconazole to 800 mg daily for the remainder of the course - some ID specialists actually recommend this higher dose anyway for invasive Candida infections and it will help to ensure that he maintains adequate serum concentrations. He would likely benefit from surveillance blood cultures x 2 sites after completing his course as well. I will ask the clinic to contact him on Monday for this to be set up 5-7 days after completion of fluconazole.     Parker and Rose will call back if he develops temperatures >100.4 or other new symptoms.     Марина Buckner MD  Infectious Diseases  [phone]

## 2019-01-14 ENCOUNTER — HOME INFUSION (PRE-WILLOW HOME INFUSION) (OUTPATIENT)
Dept: PHARMACY | Facility: CLINIC | Age: 49
End: 2019-01-14

## 2019-01-14 ENCOUNTER — TELEPHONE (OUTPATIENT)
Dept: INTERNAL MEDICINE | Facility: CLINIC | Age: 49
End: 2019-01-14

## 2019-01-14 ENCOUNTER — MYC MEDICAL ADVICE (OUTPATIENT)
Dept: PALLIATIVE MEDICINE | Facility: CLINIC | Age: 49
End: 2019-01-14

## 2019-01-14 DIAGNOSIS — R10.9 CHRONIC ABDOMINAL PAIN: ICD-10-CM

## 2019-01-14 DIAGNOSIS — Z79.891 ENCOUNTER FOR LONG-TERM OPIATE ANALGESIC USE: ICD-10-CM

## 2019-01-14 DIAGNOSIS — G89.29 CHRONIC ABDOMINAL PAIN: ICD-10-CM

## 2019-01-14 NOTE — TELEPHONE ENCOUNTER
MTM referral from: Transitions of Care (recent hospital discharge or ED visit)    MTM referral outreach attempt #2 on January 14, 2019 at 11:18 AM      Outcome: Patient not reachable after several attempts, will route to MTM Pharmacist/Provider as an FYI. Thank you for the referral.    Suki Le, MTM Coordinator    MTM Pharmacist at clinic where patient receives primary care-yes  Referred by a specialist-yes, MTM at speciality clinic-no  Patient covered for MTM-Yes JOAN    Blocked MTM provider schedule-no

## 2019-01-14 NOTE — PROGRESS NOTES
This is a recent snapshot of the patient's [facility] medical record.  For current drug dose and complete information and questions, call [phone] or In Basket pool, fv home infusion ([id number])  CSN Number:  [id number]

## 2019-01-14 NOTE — PROGRESS NOTES
This is a recent snapshot of the patient's [facility] medical record.  For current drug dose and complete information and questions, call [phone] or In Basket pool, fv home infusion ([id number])  CSN Number:  [id number]

## 2019-01-14 NOTE — PROGRESS NOTES
This is a recent snapshot of the patient's [facility] medical record.  For current drug dose and complete information and questions, call [phone] or In Basket pool, fv home infusion ([id number])  CSN Number:  [id number]

## 2019-01-15 NOTE — TELEPHONE ENCOUNTER
Received call from patient requesting refill(s) of oxyCODONE (ROXICODONE) 5 MG/5ML solution    Last picked up from pharmacy on 12/27/18    Pt last seen by prescribing provider on 10/29/18  Next appt scheduled for 286/19     checked in the past 6 months? Yes If no, print current report and give to RN    Last urine drug screen date 10/29/18  Current opioid agreement on file (completed within the last year) Yes Date of opioid agreement: 10/29/18    Processing (pick one and delete the others):    [facility]    Corky Chatman MA  [facility]    Will route to nursing pool for review and preparation of prescription(s).

## 2019-01-15 NOTE — PROGRESS NOTES
This is a recent snapshot of the patient's [facility] medical record.  For current drug dose and complete information and questions, call [phone] or In Basket pool, fv home infusion ([id number])  CSN Number:  [id number]

## 2019-01-15 NOTE — TELEPHONE ENCOUNTER
Medication refill information reviewed.     Due date for oxyCODONE (ROXICODONE) 5 MG/5ML solution is 1/28/19     Prescriptions prepped for review.     Will route to provider.

## 2019-01-16 ENCOUNTER — HOME INFUSION (PRE-WILLOW HOME INFUSION) (OUTPATIENT)
Dept: PHARMACY | Facility: CLINIC | Age: 49
End: 2019-01-16

## 2019-01-16 ENCOUNTER — TELEPHONE (OUTPATIENT)
Dept: INTERNAL MEDICINE | Facility: CLINIC | Age: 49
End: 2019-01-16

## 2019-01-16 ENCOUNTER — TELEPHONE (OUTPATIENT)
Dept: SURGERY | Facility: CLINIC | Age: 49
End: 2019-01-16

## 2019-01-16 RX ORDER — OXYCODONE HCL 5 MG/5 ML
10-15 SOLUTION, ORAL ORAL EVERY 4 HOURS PRN
Qty: 1800 ML | Refills: 0 | Status: SHIPPED | OUTPATIENT
Start: 2019-01-16 | End: 2019-02-12

## 2019-01-16 NOTE — TELEPHONE ENCOUNTER
Called and spoke with Kvng in regards to patient. Kvng states patient is not homebound. Currently outpatient at [facility] in [place]. Discussed that no orders will be placed by our team until patient is seen in clinic. Patient cancelled appointment with Dr. Vyas last week on 1/10/19.

## 2019-01-16 NOTE — TELEPHONE ENCOUNTER
Reason for call:  Other   Patient called regarding (reason for call): call back  Additional comments: Pt is no longer home bound and will need to be switched to outpatient please call to coordinate.    Phone number to reach patient:  Other phone number:  jaryl - [phone]    Best Time:  anytime    Can we leave a detailed message on this number?  YES

## 2019-01-16 NOTE — TELEPHONE ENCOUNTER
Patient called to schedule an appointment for a hospital follow-up or appeared on a report showing that they were recently discharged from the hospital.     [facility]   Discharged date: 1/09/19  Reason for hospital admission:  Blood Infection   Does patient have future appointment scheduled with provider? No  Date of future appointment:  Patient needs to be seen in a week to 10 days of discharge,       This information will be used to help the care team plan for the patients upcoming visit.  The triage RN may determine that a follow up call is necessary and reach out to the patient via the phone number listed in the chart.     Please route this message on routine priority to the Triage RN pool.

## 2019-01-16 NOTE — TELEPHONE ENCOUNTER
Reviewed- patient is doing oxycodone alone.    Script Eprescribed to pharmacy    Will send this to MA team to notify patient.    Signed Prescriptions:                        Disp   Refills    oxyCODONE (ROXICODONE) 5 MG/5ML solution   1800 mL0        Sig: Take 10-15 mLs (10-15 mg) by mouth every 4 hours as           needed for pain Max 60 mg/day. Fill on/after           1/26/19 to start on/after 1/28/19.  Authorizing Provider: BRANDYN JENKINS MD  [facility]

## 2019-01-17 ENCOUNTER — TELEPHONE (OUTPATIENT)
Dept: OPHTHALMOLOGY | Facility: CLINIC | Age: 49
End: 2019-01-17

## 2019-01-17 ENCOUNTER — TELEPHONE (OUTPATIENT)
Dept: INTERNAL MEDICINE | Facility: CLINIC | Age: 49
End: 2019-01-17

## 2019-01-17 ENCOUNTER — INFUSION THERAPY VISIT (OUTPATIENT)
Dept: INFUSION THERAPY | Facility: CLINIC | Age: 49
End: 2019-01-17
Attending: INTERNAL MEDICINE
Payer: COMMERCIAL

## 2019-01-17 ENCOUNTER — PATIENT OUTREACH (OUTPATIENT)
Dept: CARE COORDINATION | Facility: CLINIC | Age: 49
End: 2019-01-17

## 2019-01-17 ENCOUNTER — HOME INFUSION (PRE-WILLOW HOME INFUSION) (OUTPATIENT)
Dept: PHARMACY | Facility: CLINIC | Age: 49
End: 2019-01-17

## 2019-01-17 DIAGNOSIS — R13.10 DYSPHAGIA: ICD-10-CM

## 2019-01-17 DIAGNOSIS — E64.0 SEQUELAE OF PROTEIN-CALORIE MALNUTRITION (H): ICD-10-CM

## 2019-01-17 DIAGNOSIS — R11.11 VOMITING WITHOUT NAUSEA: ICD-10-CM

## 2019-01-17 DIAGNOSIS — Z98.84 BARIATRIC SURGERY STATUS: Primary | ICD-10-CM

## 2019-01-17 LAB
ALBUMIN SERPL-MCNC: 3.3 G/DL (ref 3.4–5)
ALP SERPL-CCNC: 80 U/L (ref 40–150)
ALT SERPL W P-5'-P-CCNC: 35 U/L (ref 0–70)
AST SERPL W P-5'-P-CCNC: 29 U/L (ref 0–45)
BASOPHILS # BLD AUTO: 0.1 10E9/L (ref 0–0.2)
BASOPHILS NFR BLD AUTO: 0.7 %
BILIRUB DIRECT SERPL-MCNC: <0.1 MG/DL (ref 0–0.2)
BILIRUB SERPL-MCNC: 0.6 MG/DL (ref 0.2–1.3)
BUN SERPL-MCNC: 8 MG/DL (ref 7–30)
CALCIUM SERPL-MCNC: 8.2 MG/DL (ref 8.5–10.1)
CHLORIDE SERPL-SCNC: 112 MMOL/L (ref 94–109)
CO2 SERPL-SCNC: 27 MMOL/L (ref 20–32)
CREAT SERPL-MCNC: 0.55 MG/DL (ref 0.66–1.25)
DIFFERENTIAL METHOD BLD: ABNORMAL
EOSINOPHIL NFR BLD AUTO: 1.1 %
ERYTHROCYTE [DISTWIDTH] IN BLOOD BY AUTOMATED COUNT: 13.4 % (ref 10–15)
GFR SERPL CREATININE-BSD FRML MDRD: >90 ML/MIN/{1.73_M2}
GLUCOSE SERPL-MCNC: 102 MG/DL (ref 70–99)
HCT VFR BLD AUTO: 37.1 % (ref 40–53)
HGB BLD-MCNC: 12.4 G/DL (ref 13.3–17.7)
IMM GRANULOCYTES # BLD: 0.1 10E9/L (ref 0–0.4)
IMM GRANULOCYTES NFR BLD: 0.7 %
LYMPHOCYTES # BLD AUTO: 2.1 10E9/L (ref 0.8–5.3)
LYMPHOCYTES NFR BLD AUTO: 25 %
MAGNESIUM SERPL-MCNC: 1.7 MG/DL (ref 1.6–2.3)
MCH RBC QN AUTO: 31.1 PG (ref 26.5–33)
MCHC RBC AUTO-ENTMCNC: 33.4 G/DL (ref 31.5–36.5)
MCV RBC AUTO: 93 FL (ref 78–100)
MONOCYTES # BLD AUTO: 0.8 10E9/L (ref 0–1.3)
MONOCYTES NFR BLD AUTO: 9.9 %
NEUTROPHILS # BLD AUTO: 5.2 10E9/L (ref 1.6–8.3)
NEUTROPHILS NFR BLD AUTO: 62.6 %
NRBC # BLD AUTO: 0 10*3/UL
NRBC BLD AUTO-RTO: 0 /100
PHOSPHATE SERPL-MCNC: 3.3 MG/DL (ref 2.5–4.5)
PLATELET # BLD AUTO: 178 10E9/L (ref 150–450)
POTASSIUM SERPL-SCNC: 4.1 MMOL/L (ref 3.4–5.3)
PROT SERPL-MCNC: 6.8 G/DL (ref 6.8–8.8)
RBC # BLD AUTO: 3.99 10E12/L (ref 4.4–5.9)
SODIUM SERPL-SCNC: 144 MMOL/L (ref 133–144)
TRIGL SERPL-MCNC: 96 MG/DL
WBC # BLD AUTO: 8.4 10E9/L (ref 4–11)

## 2019-01-17 PROCEDURE — 85025 COMPLETE CBC W/AUTO DIFF WBC: CPT | Performed by: SURGERY

## 2019-01-17 PROCEDURE — 82247 BILIRUBIN TOTAL: CPT | Performed by: SURGERY

## 2019-01-17 PROCEDURE — 84075 ASSAY ALKALINE PHOSPHATASE: CPT | Performed by: SURGERY

## 2019-01-17 PROCEDURE — 84460 ALANINE AMINO (ALT) (SGPT): CPT | Performed by: SURGERY

## 2019-01-17 PROCEDURE — 84450 TRANSFERASE (AST) (SGOT): CPT | Performed by: SURGERY

## 2019-01-17 PROCEDURE — 84155 ASSAY OF PROTEIN SERUM: CPT | Performed by: SURGERY

## 2019-01-17 PROCEDURE — 84100 ASSAY OF PHOSPHORUS: CPT | Performed by: SURGERY

## 2019-01-17 PROCEDURE — 82248 BILIRUBIN DIRECT: CPT | Performed by: SURGERY

## 2019-01-17 PROCEDURE — 82040 ASSAY OF SERUM ALBUMIN: CPT | Performed by: SURGERY

## 2019-01-17 PROCEDURE — 80048 BASIC METABOLIC PNL TOTAL CA: CPT | Performed by: SURGERY

## 2019-01-17 PROCEDURE — 36592 COLLECT BLOOD FROM PICC: CPT

## 2019-01-17 PROCEDURE — 83735 ASSAY OF MAGNESIUM: CPT | Performed by: SURGERY

## 2019-01-17 PROCEDURE — 84478 ASSAY OF TRIGLYCERIDES: CPT | Performed by: SURGERY

## 2019-01-17 ASSESSMENT — ACTIVITIES OF DAILY LIVING (ADL)
DEPENDENT_IADLS:: MEDICATION MANAGEMENT;TRANSPORTATION;SHOPPING
DEPENDENT_IADLS:: MEDICATION MANAGEMENT;TRANSPORTATION;SHOPPING

## 2019-01-17 NOTE — PROGRESS NOTES
Clinic Care Coordination Contact  [facility]/Voicemail    Referral Source: IP Report  Clinical Data: Care Coordinator Outreach  Outreach attempted x 1.  Left message on voicemail with call back information and requested return call.  Plan: Care Coordinator mailed out care coordination introduction letter on 4/4/16. Care Coordinator will try to reach patient again in 3-5 business days.    Melissa Behl BSN, RN, N  [facility] Care Coordinator  [phone]

## 2019-01-17 NOTE — PROGRESS NOTES
Clinic Care Coordination Contact    Clinic Care Coordination Contact  OUTREACH    Referral Information:  Referral Source: IP Report    Primary Diagnosis: SIRS/Sepsis    Chief Complaint   Patient presents with     Clinic Care Coordination - Follow-up     RN        Universal Utilization: Patient is followed Infectious Disease, Pain Management and Bariatric Surgery  Clinic Utilization  Difficulty keeping appointments:: No  Compliance Concerns: Yes  No-Show Concerns: No  No PCP office visit in Past Year: No  Utilization    Last refreshed: 1/17/2019 12:32 PM:  Hospital Admissions 4           Last refreshed: 1/17/2019 12:32 PM:  ED Visits 3           Last refreshed: 1/17/2019 12:32 PM:  No Show Count (past year) 2              Current as of: 1/17/2019 12:32 PM              Clinical Concerns:  Current Medical Concerns:  Spouse contacted RN CC to verbalize concern that home care will not take patient due to not being homebound.  RN CC advised spouse to contact [facility] to discuss.  Spouse requested RN CC to find a home infusion company for patient and stated she would like [facility].  RN CC spoke with RN at [facility] who informed RN CC he has been notified by both [facility] and [facility] that patient is not homebound, therefore, does not qualify for home care services.  Patient will need to continue to receive infusion services from outpatient infusion center.  Spouse states she does not want patient to go back to [facility].  [facility] RN informed writer he will contact spouse tomorrow to discuss outpatient infusion center options.  Patient's spouse has made follow up appointments with PCP (1/18/19), bariatric surgery (1/31/19) and infectious disease (2/11/19).  No ophthalmology follow up has been scheduled at this time.    Patient Active Problem List   Diagnosis     Peptic ulcer disease     Gastric bypass status for obesity      Chronic abdominal pain     Vomiting     Anemia     ADHD (attention deficit hyperactivity disorder), inattentive type     Vitamin B12 deficiency without anemia     Thiamine deficiency     Dehydration     Dysphagia     Weight loss, non-intentional     Malnutrition (H)     Chronic anxiety     Constipation     Bile reflux esophagitis     Former smoker     Vitamin D deficiency     Iron deficiency     Health Care Home     Chronic pain     Insomnia     Positive blood culture     Fungemia     Chronic nausea     Gastrostomy tube in place (H)     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)     Short gut syndrome     Anxiety     Status post cervical spinal arthrodesis     Port or reservoir infection, initial encounter     Abnormal echocardiogram     Low serum iron     Anemia, iron deficiency     Catheter-related bloodstream infection (CRBSI)     Generalized weakness     S/P bariatric surgery     Hyperlipidemia LDL goal <130     Bacteremia     Infection by Candida species       Current Behavioral Concerns: deferred at this contact      Education Provided to patient: RN CC advised patient's spouse to verbalize her concerns to [facility] and [facility].     Pain  Pain (GOAL):: Yes  Type: Chronic (>3mo)  Location of chronic pain:: chronic abdominal pain and lower back pain  Radiating: Yes  Progression: Waxing and Waning  Chronic pain severity:: 7  Limitation of routine activities due to chronic pain:: Yes  Description: Unable to perform most daily activities (chores, hobbies, social activities, driving)  Alleviating Factors: Pain Medication, Rest, Heat  Health Maintenance Reviewed: Due/Overdue   Health Maintenance Due   Topic Date Due     LIPID MONITORING Q1 YEAR  05/04/2017     DTAP/TDAP/TD IMMUNIZATION (8 - Td) 01/23/2019     TOBACCO CESSATION COUNSELING Q1 YR  02/06/2019      Clinical Pathway: None    Medication Management:  Spouse Rose assists patient with medication management.      Functional Status:  Dependent ADLs:: Ambulation-cane, Bathing  Dependent IADLs:: Medication Management, Transportation, Shopping  Bed or wheelchair confined:: No  Mobility Status: Independent    Living Situation:  Current living arrangement:: I live in a private home with spouse  Type of residence:: Private home - stairs    Diet/Exercise/Sleep:  Diet:: Regular(But also has TPN)  Inadequate nutrition (GOAL):: No  Food Insecurity: No  Tube Feeding: No  Exercise:: Yes  Days per week of moderate to strenuous exercise (like a brisk walk): 5  On average, minutes per day of exercise at this level: 10  How intense was your typical exercise? : Light (like stretching or slow walking)  Exercise Minutes per Week: 50  Inadequate activity/exercise (GOAL):: No  Significant changes in sleep pattern (GOAL): No    Transportation:  Transportation concerns (GOAL):: No  Transportation means:: Regular car     Psychosocial:  Sikhism or spiritual beliefs that impact treatment:: No  Mental health DX:: Yes  Mental health DX how managed:: Medication  Mental health management concern (GOAL):: No  Informal Support system:: Family, Spouse     Financial/Insurance:   Financial/Insurance concerns (GOAL):: No       Resources and Interventions:  Current Resources:   List of home care services:: Skilled Nursing;   Community Resources: Home Infusion, Home Care  Supplies used at home:: None  Equipment Currently Used at Home: cane, straight, shower chair    Advance Care Plan/Directive  Advanced Care Plans/Directives on file:: Yes  Type Advanced Care Plans/Directives: Advanced Directive - On File  Advanced Care Plan/Directive Status: Not Applicable    Referrals Placed: None     Goals:   Goals        General    #1 Medical (pt-stated)     Notes - Note edited  1/10/2019  2:00 PM by Behl, Melissa K, RN    Goal Statement: Spouse will schedule a hospital f/u with PCP within 7 days of discharge.  Measure of Success: Patient will schedule and attend  hospital follow up with PCP.   Supportive Steps to Achieve: RN CC reviewed hospital discharge instructions.  Barriers: multiple appointments  Strengths: spouse supportive, care coordinatino  Date to Achieve By: 1/18/19  Patient expressed understanding of goal: yes         #2 Medical (pt-stated)     Notes - Note edited  1/10/2019  1:58 PM by Behl, Melissa K, RN    Goal Statement: Spouse will schedule ophthalmology appointment for 1/14/19.  Measure of Success: Patient will schedule and attend ophthalmology appointment for 1/14/19.  Supportive Steps to Achieve: RN CC reviewed hospital discharge instructions and provided spouse with ophthalmology [facility] scheduling number [phone].  Barriers: multiple appointments, distance from [facility]  Strengths: supportive spouse, care coordination  Date to Achieve By: 1/18/19  Patient expressed understanding of goal: yes         #3 Medical (pt-stated)     Notes - Note created  1/10/2019  2:00 PM by Behl, Melissa K, RN    Goal Statement: Spouse will schedule a follow up appointment with bariatric surgery.  Measure of Success: Patient will schedule and attend follow up appointment with bariatric surgeon Dr. Vyas.  Supportive Steps to Achieve: Spouse has phone number, reviewed discharge instructions.  Barriers: multiple appointments, distance from clinic  Strengths: supportive spouse, care coordination  Date to Achieve By: 2/10/19  Patient expressed understanding of goal: yes         #4 Medical (pt-stated)     Notes - Note created  1/10/2019  2:01 PM by Behl, Melissa K, RN    Goal Statement: Spouse will schedule follow up with infectious disease provider.  Measure of Success: Patient will schedule and attend infectious disease appointment.  Supportive Steps to Achieve: Reviewed hospital discharge instructions, spouse has phone number.  Barriers: multiple appointments, distance from clinic.  Strengths: supportive spouse, care coordination  Date to Achieve By: 2/10/19  Patient  expressed understanding of goal: yes                 Patient/Caregiver understanding: Spouse Rose requesting assistance from writer to obtain home care services for patient.    Outreach Frequency: weekly  Future Appointments              Tomorrow Chuy Isaac MD [facility]    In 2 weeks Francis Vyas MD [facility], [facility]    In 2 weeks Patti Milligan MD [facility] [place], [facility]    In 3 weeks Mikaela Ma MD [facility], [facility]          Plan:   1. RN CC contacted [facility] and was informed that 2 Home Care agencies have deemed patient non-homebound, meaning patient does not qualify for home care services.  2. Patient will follow up with PCP tomorrow as scheduled.  3. [facility] will be contacting spouse tomorrow to go over outpatient infusion options.  4. RN CC will follow up with patient and spouse next week.    Melissa Behl BSN, RN, PHN  [facility] Care Coordinator  [phone]

## 2019-01-17 NOTE — PROGRESS NOTES
This is a recent snapshot of the patient's [facility] medical record.  For current drug dose and complete information and questions, call [phone] or In Basket pool, fv home infusion ([id number])  CSN Number:  [id number]

## 2019-01-17 NOTE — TELEPHONE ENCOUNTER
Reason for Call:  Same Day Appointment, Requested Provider:  Chuy Isaac MD    PCP: Chuy Isaac    Reason for visit: hosp follow up     Duration of symptoms: needs appt within 7 days of discharge. Was discharged on 1/9/19.     Have you been treated for this in the past? No    Additional comments: Pt's wife Rose calling. States she spoke to someone yesterday about this. She is still waiting to hear back about appt day and time.     Can we leave a detailed message on this number? YES    Phone number patient can be reached at: Home number on file [phone] (home)    Best Time: any     Call taken on 1/17/2019 at 10:41 AM by Jyothi Perez

## 2019-01-17 NOTE — PROGRESS NOTES
Infusion Nursing Note:  Parker Acevedo Sr. presents today for PICC Labs/Dressing change.    Patient seen by provider today: No   present during visit today: Not Applicable.    Note: Arrived with wife. Ambulated with stick cane to room 234.  .    Intravenous Access:  Labs drawn without difficulty.  PICC.    Treatment Conditions:  Lab Results   Component Value Date    HGB 12.4 01/17/2019     Lab Results   Component Value Date    WBC 8.4 01/17/2019      Lab Results   Component Value Date    ANEU 5.2 01/17/2019     Lab Results   Component Value Date     01/17/2019      Lab Results   Component Value Date     01/17/2019                   Lab Results   Component Value Date    POTASSIUM 4.1 01/17/2019           Lab Results   Component Value Date    MAG 2.2 01/09/2019            Lab Results   Component Value Date    CR 0.55 01/17/2019                   Lab Results   Component Value Date    SILAS 8.2 01/17/2019                Lab Results   Component Value Date    BILITOTAL 0.3 01/09/2019           Lab Results   Component Value Date    ALBUMIN 3.3 01/09/2019                    Lab Results   Component Value Date    ALT 33 01/09/2019           Lab Results   Component Value Date    AST 29 01/17/2019       Not Applicable.  Labs drawn per orders from [facility]. .      Post Infusion Assessment:  Blood return noted in each lumen prior to flush.  No evidence of extravasations.  Patient TPN infusion stopped for labs per wife, PICC labs drawn,  caps changed then wife  resumed TPN to start infusing.     Discharge Plan:   Discharge instructions reviewed with: Patient and wife.  Patient and/or family verbalized understanding of discharge instructions and all questions answered.  Copy of AVS reviewed with patient and/or family.  Patient will return 1 week for next appointment message left to call.   Patient discharged in stable condition accompanied by: self and wife.  Departure Mode: Ambulatory.  Face to Face time:  15 minutes.    Miri Mir RN          Wife returned call for next appt. Talked with Karo REEVES  and voiced unhappiness with care provided. She offered for her to talk with this nurse and she declined. They will just be going to [facility] for their care.

## 2019-01-17 NOTE — TELEPHONE ENCOUNTER
Reason for Call:  Other call back     Detailed comments: Representative from [facility] calling and has questions regarding PA for amphetamine-dextroamphetamine (ADDERALL) 20 MG tablet.    Phone Number Patient can be reached at: [phone] option #3 and case #[id number].    Best Time: any    Can we leave a detailed message on this number? Not Applicable    Call taken on 1/17/2019 at 7:57 AM by Chasidy Lauren

## 2019-01-17 NOTE — TELEPHONE ENCOUNTER
----- Message from Nevaeh Alfredo sent at 1/11/2019 12:08 PM CST -----  Regarding: Call patient to schedule appt.  I have tried numerous times to reach patient.  Unable to leave message.     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 Apparently left the hospital +/- AMA after a procedure. Is supposed to stay on anti-fungal therapy (through PICC line) and follow-up in Retina clinic 1 week from initial appointment. Please call and see if pt can come in and if he's on IV fluconazole.     Thanks jeanne!    Dennis Pickering MD  Ophthalmology Resident  PGY-2     LM on 1-15-19 416pm to call for follow up.  Jeanne Sears COT 7:16am  January 17, 2019

## 2019-01-18 ENCOUNTER — HOME INFUSION (PRE-WILLOW HOME INFUSION) (OUTPATIENT)
Dept: PHARMACY | Facility: CLINIC | Age: 49
End: 2019-01-18

## 2019-01-18 ENCOUNTER — OFFICE VISIT (OUTPATIENT)
Dept: INTERNAL MEDICINE | Facility: CLINIC | Age: 49
End: 2019-01-18
Payer: COMMERCIAL

## 2019-01-18 VITALS
OXYGEN SATURATION: 99 % | HEART RATE: 94 BPM | SYSTOLIC BLOOD PRESSURE: 132 MMHG | DIASTOLIC BLOOD PRESSURE: 88 MMHG | RESPIRATION RATE: 18 BRPM | WEIGHT: 196.6 LBS | TEMPERATURE: 98.4 F | BODY MASS INDEX: 27.04 KG/M2

## 2019-01-18 DIAGNOSIS — E46 MALNUTRITION, UNSPECIFIED TYPE (H): ICD-10-CM

## 2019-01-18 DIAGNOSIS — Z98.84 S/P BARIATRIC SURGERY: ICD-10-CM

## 2019-01-18 DIAGNOSIS — R73.09 ELEVATED GLUCOSE LEVEL: ICD-10-CM

## 2019-01-18 DIAGNOSIS — B37.9 INFECTION BY CANDIDA SPECIES: Primary | ICD-10-CM

## 2019-01-18 DIAGNOSIS — F90.0 ADHD (ATTENTION DEFICIT HYPERACTIVITY DISORDER), INATTENTIVE TYPE: ICD-10-CM

## 2019-01-18 DIAGNOSIS — T80.211D CATHETER-RELATED BLOODSTREAM INFECTION, SUBSEQUENT ENCOUNTER: ICD-10-CM

## 2019-01-18 PROCEDURE — 99495 TRANSJ CARE MGMT MOD F2F 14D: CPT | Performed by: INTERNAL MEDICINE

## 2019-01-18 ASSESSMENT — PAIN SCALES - GENERAL: PAINLEVEL: SEVERE PAIN (6)

## 2019-01-18 NOTE — PROGRESS NOTES
SUBJECTIVE:   Parker Acevedo Sr. is a 48 year old male who presents to clinic today for the following health issues:    Hospital Follow-up Visit:    Hospital/Nursing Home/IP Rehab Facility: [facility]  Date of Admission: 1/4/19  Date of Discharge: 1/9/18  Reason(s) for Admission: Sepsis from candidemia, chronic malnutrition on TPN, Anxiety and ADHD            Problems taking medications regularly:  None       Medication changes since discharge: None       Problems adhering to non-medication therapy:  None    Summary of hospitalization:  [facility] discharge summary reviewed  Diagnostic Tests/Treatments reviewed.  Follow up needed: none  Other Healthcare Providers Involved in Patient s Care:         trouble with homecare  Update since discharge: improved.     Post Discharge Medication Reconciliation: discharge medications reconciled and changed, per note/orders (see AVS).  Plan of care communicated with patient and family          Taking his fluconazole for the full 10 day course.  New PICC from the 8th.      Was getting homecare, but they that was stopped as he was not thought to be homebound.      Still eating or trying to do a little.      Pain control is just oxycodone from pain clinic, no more fentanyl patch.    Taking Adderall daily and doing ok, just was refilled.      Ativan is 1-2 a night, refilled recently..    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)      Chronic abdominal pain      Complication of anesthesia      Difficulty swallowing      Gastric ulcer, unspecified as acute or chronic, without mention of hemorrhage, perforation, or obstruction      Gastro-oesophageal reflux disease      Generalized weakness 1/30/2018     Head injury      Hiatal hernia   "    Other bladder disorder      Other chronic pain      PONV (postoperative nausea and vomiting)      Severe malnutrition (H)     TPN     Short gut syndrome      Tobacco abuse      Current Outpatient Medications   Medication     acetaminophen (TYLENOL) 32 mg/mL liquid     albuterol (PROAIR HFA/PROVENTIL HFA/VENTOLIN HFA) 108 (90 Base) MCG/ACT Inhaler     amphetamine-dextroamphetamine (ADDERALL) 20 MG tablet     B-D TB SYRINGE 27G X 1/2\" 1 ML MISC     blood glucose (NO BRAND SPECIFIED) lancets standard     blood glucose (NO BRAND SPECIFIED) test strip     blood glucose monitoring (NO BRAND SPECIFIED) meter device kit     carvedilol (COREG) 6.25 MG tablet     cyanocobalamin (VITAMIN B12) 1000 MCG/ML injection     [facility] INFUSION MANAGED PATIENT     fluconazole (DIFLUCAN) 200 MG tablet     lidocaine (LIDODERM) 5 % Patch     LORazepam (ATIVAN) 1 MG tablet     Needle, Disp, (BD DISP NEEDLES) 27G X 1/2\" MISC     ondansetron (ZOFRAN-ODT) 8 MG ODT tab     order for DME     order for DME     oxyCODONE (ROXICODONE) 5 MG/5ML solution     sodium chloride 0.9% infusion     sucralfate (CARAFATE) 1 GM/10ML suspension     vitamin D (ERGOCALCIFEROL) 60199 UNIT capsule     fluconazole (DIFLUCAN) 200 MG tablet     No current facility-administered medications for this visit.      Social History     Tobacco Use     Smoking status: Current Some Day Smoker     Packs/day: 0.25     Years: 3.00     Pack years: 0.75     Types: Cigarettes     Last attempt to quit: 2018     Years since quittin.4     Smokeless tobacco: Never Used     Tobacco comment: I use an e cig every now and than   Substance Use Topics     Alcohol use: No     Comment: quit      Drug use: No     Review of Systems  Constitutional-No fevers, chills, or weight changes..  Cardiac-No chest pain or palpitations.  Respiratory-No cough, sob, or hemoptysis.  GI chronic diarrhea.  Musculoskeletal-foot pains and healing.    Physical Exam  /88   Pulse 94   " Temp 98.4  F (36.9  C) (Temporal)   Resp 18   Wt 89.2 kg (196 lb 9.6 oz)   SpO2 99%   BMI 27.04 kg/m    General Appearance-healthy, alert, no distress  Cardiac-regular rate and rhythm  with normal S1, S2 ; no murmur, rub or gallops  Lungs-clear to auscultation  Extremities-picc line is ok, no erythema, appropriate dressing.              ASSESSMENT:  This is a 48-year-old gentleman who has a long history of gastric bypass Celestino-en-Y surgery that did not go well he is left with chronic diarrhea and dependent on TPN for his calories.  He had a PICC line in his right arm, developed fevers and it going and it was found to have a yeast infection of his blood, no endocarditis.  They remove the right-sided PICC line treated him with IV therapy after sensitivities returned 3 days later replaced his PICC line on the left arm send him home with oral fluconazole to finish a 14-day course, he has 4-5 days left of this.  Patient is doing better.  He is afebrile here normal vital signs.  His PICC line looks good it is nice and clean and dry.    There is an issue with his TPN as he had some trouble with home care he is homebound does not leave his home due to his chronic diarrhea.  Will be nice of home care could see him again and help with his PICC line and chronic infusion of TPN.  He will be following up with his gastric bypass surgery on January 31.  He needs another plan of what to do with his vascular access, plan for continued TPN with risk for recurrent yeast infections.    Chronic pain the patient is just on oxycodone that he gets from the pain clinic he has been weaned off the fentanyl patch.    The patient is on Adderall daily 20 mg for ADHD and he stable on this.  He also takes Ativan 1-2 a day.  I have given him prescriptions recently he needs to be seen every 3 months.    Electronically signed by Chuy Isaac MD

## 2019-01-18 NOTE — PROGRESS NOTES
This is a recent snapshot of the patient's [facility] medical record.  For current drug dose and complete information and questions, call [phone] or In [facility] pool, fv home infusion ([id number])  CSN Number:  [id number]

## 2019-01-21 ENCOUNTER — HOME INFUSION (PRE-WILLOW HOME INFUSION) (OUTPATIENT)
Dept: PHARMACY | Facility: CLINIC | Age: 49
End: 2019-01-21

## 2019-01-22 ENCOUNTER — HOME INFUSION (PRE-WILLOW HOME INFUSION) (OUTPATIENT)
Dept: PHARMACY | Facility: CLINIC | Age: 49
End: 2019-01-22

## 2019-01-22 ENCOUNTER — TELEPHONE (OUTPATIENT)
Dept: PALLIATIVE MEDICINE | Facility: CLINIC | Age: 49
End: 2019-01-22

## 2019-01-22 NOTE — PROGRESS NOTES
This is a recent snapshot of the patient's [facility] medical record.  For current drug dose and complete information and questions, call [phone] or In Basket pool, fv home infusion ([id number])  CSN Number:  [id number]

## 2019-01-22 NOTE — TELEPHONE ENCOUNTER
Reviewed fax from [facility] pharmacy asking if the dispense date could be changed to 1/25 rather than 1/26 since the pharmacy is closed on that day. Called pharmacy and spoke with Fany, pharmacist. Let her know that it was OK to fill oxycodone 5 mg on 1/25/19.     Will update medication list.       ERIN Crook, RN-BC  Patient Care Supervisor/Care Coordinator  [facility]

## 2019-01-22 NOTE — PROGRESS NOTES
This is a recent snapshot of the patient's [facility] medical record.  For current drug dose and complete information and questions, call [phone] or In Basket pool, fv home infusion ([id number])  CSN Number:  [id number]

## 2019-01-22 NOTE — TELEPHONE ENCOUNTER
Prior Authorization Retail Medication Request    Spoke with Fany, pharmacist, at [facility] pharmacy  She is getting rejection messages when trying to run this Rx. Message states that it is Non formulary and Plan limit exceed max of 7 day supply.     Medication/Dose:   oxyCODONE (ROXICODONE) 5 MG/5ML solution 1800 mL 0 1/16/2019  No   Sig - Route: Take 10-15 mLs (10-15 mg) by mouth every 4 hours as needed for pain Max 60 mg/day. Fill on/after 1/26/19 to start on/after 1/28/19. - Oral     ICD code (if different than what is on RX):    Previously Tried and Failed:    Rationale:      Insurance Name: [employer] [place] Part D Phone: [phone]  Insurance ID:  [id number]      Pharmacy Information (if different than what is on RX)  Name:    Phone:      Will route to PA team for processing.    Gabbi Heath, YADIRAN, RN-BC  Patient Care Supervisor/Care Coordinator  [facility]

## 2019-01-23 ASSESSMENT — ACTIVITIES OF DAILY LIVING (ADL): DEPENDENT_IADLS:: MEDICATION MANAGEMENT;TRANSPORTATION;SHOPPING

## 2019-01-23 NOTE — PROGRESS NOTES
"Clinic Care Coordination Contact  Care Team Conversations    RN CC received a call from spouse with an update.  Patient has been declined by one additional home care agency, \"[facility].\"  Spouse Rose states she is working with [facility] on finding another home care infusion company.  Rose states they would like to go back to [facility] and she does not understand why they won't take patient back.  Rose states she has left a message for the home care manager to further discuss.    Rose states she will be scheduling an ophthalmology appointment for patient, and will try to coordinate to have it on 1/31/19, the same day as his appointment with bariatric surgery.  RN CC left a message for [facility]   RN to return writer's call to discuss any other options for home care for patient.    RN CC will await call back.    Melissa Behl BSN, RN, PHN  [facility] Care Coordinator  [phone]       "

## 2019-01-23 NOTE — PROGRESS NOTES
This is a recent snapshot of the patient's [facility] medical record.  For current drug dose and complete information and questions, call [phone] or In Basket pool, fv home infusion ([id number])  CSN Number:  [id number]

## 2019-01-23 NOTE — PROGRESS NOTES
Clinic Care Coordination Contact  Care Team Conversations    RN CC received call back from [facility] nurse Kvng.  No known home infusion companies will accept patient at this time including [facility], [facility], [facility], [facility] (now [facility]) and [facility].  No other options known for line cares other than an infusion center.    RN CC spoke with patient's spouse Rose and informed her of the above information.  Rose stated she is not sure patient can tolerate a car ride to an infusion center, as she states he is in significant pain since his hospitalization.  Spouse states the car rides worsen his pain and she requests assistance in finding a home care agency that will perform line cares.  RN CC advised spouse to let [facility] know what infusion center or clinic (if they perform central line cares) she would like to bring patient to.  RN CC has left a message for [facility] manager to determine if patient would be reconsidered based on PCP's documentation on 1/18/19 encounter stating he is considered homebound.      RN CC will await call back from [facility] and home care with any further recommendations for patient.    Clinic Care Coordination Contact  Care Team Conversations    RN CC received call back from [facility] Manager.  Patient will not be reconsidered by home care.  The home care director will be contacting spouse to inform her of this.  RN CC received update from Kvgn HARRISON with [facility] informing CHRISTY JERRY that [facility] will not take patient.  [facility] no longer does infusion cares.  No other home infusion agencies known.    Possible option would be for spouse to go down to the Patient [facility] at [facility] ([phone]) to be taught dressing changes if patient can have a Cm line placed.  Kvng will contact patient/spouse tomorrow to inform them of no home care agencies who will accept patient.       Melissa Behl BSN, RN, PHN  [facility] Care Coordinator  [phone]

## 2019-01-23 NOTE — TELEPHONE ENCOUNTER
Prior Authorization Approval    Authorization Effective Date: 1/1/2019  Authorization Expiration Date: 1/20/2020  Medication: adderall-APPROVED  Approved Dose/Quantity:   Reference #:     Insurance Company: [employer] - Phone [phone] Fax [phone]  Expected CoPay:       CoPay Card Available:      Foundation Assistance Needed:    Which Pharmacy is filling the prescription (Not needed for infusion/clinic administered): [facility] [place] [address] [place] [address]  Pharmacy Notified: Yes  Patient Notified: No    Pharmacy will notify patient when medication is ready.

## 2019-01-23 NOTE — TELEPHONE ENCOUNTER
Central Prior Authorization Team   Phone: [phone]      PA Initiation    Medication: adderall-Initiated  Insurance Company: [employer] - Phone [phone] Fax [phone]  Pharmacy Filling the Rx: [facility] [place] [address] [place] [address]  Filling Pharmacy Phone: [phone]  Filling Pharmacy Fax:    Start Date: 1/23/2019    Called [employer] to answer additional questions, they were already answered.  A determination fax is being sent over.

## 2019-01-24 ENCOUNTER — HOME INFUSION (PRE-WILLOW HOME INFUSION) (OUTPATIENT)
Dept: PHARMACY | Facility: CLINIC | Age: 49
End: 2019-01-24

## 2019-01-24 NOTE — TELEPHONE ENCOUNTER
Central Prior Authorization Team   Phone: [phone]      PA Initiation    Medication: oxycodone-Initiated  Insurance Company: Other (see comments)Comment:  Prime [phone].  Pharmacy Filling the Rx: [facility] [place] [address]  Filling Pharmacy Phone: [phone]  Filling Pharmacy Fax:    Start Date: 1/24/2019

## 2019-01-24 NOTE — TELEPHONE ENCOUNTER
Please process this PA ASAP, pt can not stop taking this medication and he supposed to start soon.     Corky Chatman MA  [facility]

## 2019-01-24 NOTE — TELEPHONE ENCOUNTER
Prior Authorization Approval    Authorization Effective Date: 1/1/2019  Authorization Expiration Date: 1/24/2020  Medication: oxycodone-APPROVED  Approved Dose/Quantity:   Reference #:     Insurance Company: Other (see comments)Comment:  Prime [phone].  Expected CoPay:       CoPay Card Available:      Foundation Assistance Needed:    Which Pharmacy is filling the prescription (Not needed for infusion/clinic administered): [facility] [place] [address]  Pharmacy Notified: Yes  Patient Notified: No    Pharmacy will notify patient when medication is ready.

## 2019-01-25 ENCOUNTER — TELEPHONE (OUTPATIENT)
Dept: INFECTIOUS DISEASES | Facility: CLINIC | Age: 49
End: 2019-01-25

## 2019-01-25 ENCOUNTER — INFUSION THERAPY VISIT (OUTPATIENT)
Dept: INFUSION THERAPY | Facility: CLINIC | Age: 49
End: 2019-01-25
Attending: SURGERY
Payer: COMMERCIAL

## 2019-01-25 ENCOUNTER — HOME INFUSION (PRE-WILLOW HOME INFUSION) (OUTPATIENT)
Dept: PHARMACY | Facility: CLINIC | Age: 49
End: 2019-01-25

## 2019-01-25 VITALS
TEMPERATURE: 99.1 F | DIASTOLIC BLOOD PRESSURE: 87 MMHG | RESPIRATION RATE: 18 BRPM | OXYGEN SATURATION: 96 % | HEART RATE: 65 BPM | SYSTOLIC BLOOD PRESSURE: 139 MMHG | HEIGHT: 72 IN | BODY MASS INDEX: 27.04 KG/M2

## 2019-01-25 DIAGNOSIS — D64.9 ANEMIA: ICD-10-CM

## 2019-01-25 DIAGNOSIS — T80.211D CATHETER-RELATED BLOODSTREAM INFECTION, SUBSEQUENT ENCOUNTER: Primary | ICD-10-CM

## 2019-01-25 DIAGNOSIS — T80.211D CATHETER-RELATED BLOODSTREAM INFECTION, SUBSEQUENT ENCOUNTER: ICD-10-CM

## 2019-01-25 DIAGNOSIS — E61.1 IRON DEFICIENCY: Primary | ICD-10-CM

## 2019-01-25 DIAGNOSIS — E46 MALNUTRITION, UNSPECIFIED TYPE (H): ICD-10-CM

## 2019-01-25 DIAGNOSIS — E86.0 DEHYDRATION: ICD-10-CM

## 2019-01-25 LAB
ALBUMIN SERPL-MCNC: 3.4 G/DL (ref 3.4–5)
ALP SERPL-CCNC: 80 U/L (ref 40–150)
ALT SERPL W P-5'-P-CCNC: 26 U/L (ref 0–70)
AST SERPL W P-5'-P-CCNC: 16 U/L (ref 0–45)
BASOPHILS # BLD AUTO: 0.1 10E9/L (ref 0–0.2)
BASOPHILS NFR BLD AUTO: 1 %
BILIRUB DIRECT SERPL-MCNC: <0.1 MG/DL (ref 0–0.2)
BILIRUB SERPL-MCNC: 0.4 MG/DL (ref 0.2–1.3)
BUN SERPL-MCNC: 11 MG/DL (ref 7–30)
CALCIUM SERPL-MCNC: 8.4 MG/DL (ref 8.5–10.1)
CHLORIDE SERPL-SCNC: 108 MMOL/L (ref 94–109)
CO2 SERPL-SCNC: 29 MMOL/L (ref 20–32)
CREAT SERPL-MCNC: 0.81 MG/DL (ref 0.66–1.25)
DIFFERENTIAL METHOD BLD: NORMAL
EOSINOPHIL # BLD AUTO: 0.1 10E9/L (ref 0–0.7)
EOSINOPHIL NFR BLD AUTO: 2 %
ERYTHROCYTE [DISTWIDTH] IN BLOOD BY AUTOMATED COUNT: 13.5 % (ref 10–15)
GFR SERPL CREATININE-BSD FRML MDRD: >90 ML/MIN/{1.73_M2}
GLUCOSE SERPL-MCNC: 94 MG/DL (ref 70–99)
HCT VFR BLD AUTO: 41.7 % (ref 40–53)
HGB BLD-MCNC: 14 G/DL (ref 13.3–17.7)
LYMPHOCYTES # BLD AUTO: 3.1 10E9/L (ref 0.8–5.3)
LYMPHOCYTES NFR BLD AUTO: 42 %
MCH RBC QN AUTO: 31.5 PG (ref 26.5–33)
MCHC RBC AUTO-ENTMCNC: 33.6 G/DL (ref 31.5–36.5)
MCV RBC AUTO: 93 FL (ref 78–100)
MONOCYTES # BLD AUTO: 0.7 10E9/L (ref 0–1.3)
MONOCYTES NFR BLD AUTO: 9 %
NEUTROPHILS # BLD AUTO: 3.4 10E9/L (ref 1.6–8.3)
NEUTROPHILS NFR BLD AUTO: 46 %
PHOSPHATE SERPL-MCNC: 4.7 MG/DL (ref 2.5–4.5)
POTASSIUM SERPL-SCNC: 3.7 MMOL/L (ref 3.4–5.3)
PROT SERPL-MCNC: 6.9 G/DL (ref 6.8–8.8)
RBC # BLD AUTO: 4.45 10E12/L (ref 4.4–5.9)
SODIUM SERPL-SCNC: 144 MMOL/L (ref 133–144)
TRIGL SERPL-MCNC: 133 MG/DL
WBC # BLD AUTO: 7.4 10E9/L (ref 4–11)

## 2019-01-25 PROCEDURE — 36415 COLL VENOUS BLD VENIPUNCTURE: CPT | Performed by: STUDENT IN AN ORGANIZED HEALTH CARE EDUCATION/TRAINING PROGRAM

## 2019-01-25 PROCEDURE — 36592 COLLECT BLOOD FROM PICC: CPT

## 2019-01-25 PROCEDURE — 84478 ASSAY OF TRIGLYCERIDES: CPT | Performed by: SURGERY

## 2019-01-25 PROCEDURE — 85025 COMPLETE CBC W/AUTO DIFF WBC: CPT | Performed by: SURGERY

## 2019-01-25 PROCEDURE — 82248 BILIRUBIN DIRECT: CPT | Performed by: SURGERY

## 2019-01-25 PROCEDURE — 80053 COMPREHEN METABOLIC PANEL: CPT | Performed by: SURGERY

## 2019-01-25 PROCEDURE — 87040 BLOOD CULTURE FOR BACTERIA: CPT | Performed by: STUDENT IN AN ORGANIZED HEALTH CARE EDUCATION/TRAINING PROGRAM

## 2019-01-25 PROCEDURE — 87040 BLOOD CULTURE FOR BACTERIA: CPT | Performed by: SURGERY

## 2019-01-25 PROCEDURE — 84100 ASSAY OF PHOSPHORUS: CPT | Performed by: SURGERY

## 2019-01-25 ASSESSMENT — ACTIVITIES OF DAILY LIVING (ADL): DEPENDENT_IADLS:: MEDICATION MANAGEMENT;TRANSPORTATION;SHOPPING

## 2019-01-25 ASSESSMENT — PAIN SCALES - GENERAL: PAINLEVEL: MODERATE PAIN (5)

## 2019-01-25 NOTE — TELEPHONE ENCOUNTER
----- Message from Mikaela Ma MD sent at 1/25/2019  1:28 PM CST -----  Regarding: RE: DSC id [place]  Hi Cristina,     Sure, why don't they get labs: CBC and CMP, along with 1 set of blood cultures. Let me know if I need to put these orders in.     If there's an opening earlier with Dr. Romero, he could certainly see her in follow up. I am away next week, so can't squeeze him in earlier.     Thanks  Mikaela  ----- Message -----  From: Cristina Fregoso RN  Sent: 1/24/2019   9:04 AM  To: Mikaela Ma MD  Subject: DSC id [place]                                     Hi Dr Al,  It looks like you saw Parker a couple times in the hospital and now he is seeing you in f/u on 2/11.    He and his wife called today to say they are worried they maybe need to be seen sooner.    He his still having low grade fevers running between 99.7 to the 100.7 which he was while I was on the phone with them. He is still taking the oral fluconazole but they report they are unsure how much of that he is actually absorbing. He still has his G/J tube and a new PICC line for fluids and TPA. He is feeling real tired and gets cold, then hot and he's has a sore throat that is new since the last couple days.    They also wonder if they should have blood cultures drawn.    Anyway, I told them I would run this by you to see what your thought's were......  Cristina Fregoso RN

## 2019-01-25 NOTE — PATIENT INSTRUCTIONS
Pt to return on 01/31/19 for Labs/MD appt/PICC drsg change. Copies of medication list and upcoming appointments given prior to discharge.

## 2019-01-25 NOTE — PROGRESS NOTES
Clinic Care Coordination Contact  [facility]/Voicemail    Referral Source: IP Report  Clinical Data: Care Coordinator Outreach  RN CC attempted to contact patient and spouse to determine if they would like writer to explore with bariatric surgery team if a referral to [facility] would be desired for spouse teaching on line cares.  Outreach attempted x 1.  Left message on voicemail with call back information and requested return call.  Plan: Care Coordinator mailed out care coordination introduction letter on 4/4/16. Care Coordinator will try to reach patient again in 5-10 business days.    Melissa Behl BSN, RN, N  [facility] Care Coordinator  [phone]

## 2019-01-25 NOTE — PROGRESS NOTES
Infusion Nursing Note:  Parker Acevedo Sr. presents today for Labs and PICC Drsg change.    Patient seen by provider today: No   present during visit today: Not Applicable.    Note: Patient and wife requested blood cultures to be drawn with labs due to on going low grade fever. Per order drawn BC x 2, from PICC and Peripheral. Dressing changed per protocol per sterile field. Blood a little sluggish on one lumen.    Intravenous Access:  PICC.    Treatment Conditions:  Lab Results   Component Value Date    HGB 14.0 01/25/2019     Lab Results   Component Value Date    WBC 7.4 01/25/2019      Lab Results   Component Value Date    ANEU 3.4 01/25/2019     Lab Results   Component Value Date     01/17/2019      Lab Results   Component Value Date     01/25/2019                   Lab Results   Component Value Date    POTASSIUM 3.7 01/25/2019           Lab Results   Component Value Date    MAG 1.7 01/17/2019            Lab Results   Component Value Date    CR 0.81 01/25/2019                   Lab Results   Component Value Date    SILAS 8.4 01/25/2019                Lab Results   Component Value Date    BILITOTAL 0.4 01/25/2019           Lab Results   Component Value Date    ALBUMIN 3.4 01/25/2019                    Lab Results   Component Value Date    ALT 26 01/25/2019           Lab Results   Component Value Date    AST 16 01/25/2019       Message sent to MDs to be aware of BC results to be coming.      Post Infusion Assessment:  Blood return noted pre and post infusion.  No evidence of extravasations.    Discharge Plan:   Discharge instructions reviewed with: Patient and Family.  Patient and/or family verbalized understanding of discharge instructions and all questions answered.  Patient discharged in stable condition accompanied by: self and wife.  Departure Mode: Ambulatory.    Kacie Guerrier RN

## 2019-01-25 NOTE — TELEPHONE ENCOUNTER
Called and LVM for pt to go get blood cultures drawn as well as labs for Dr Al to look at. Pt to call clinic with any questions.  Cristina Fregoso RN

## 2019-01-26 ENCOUNTER — HOME INFUSION (PRE-WILLOW HOME INFUSION) (OUTPATIENT)
Dept: PHARMACY | Facility: CLINIC | Age: 49
End: 2019-01-26

## 2019-01-27 ENCOUNTER — HOME INFUSION (PRE-WILLOW HOME INFUSION) (OUTPATIENT)
Dept: PHARMACY | Facility: CLINIC | Age: 49
End: 2019-01-27

## 2019-01-28 ENCOUNTER — HOME INFUSION (PRE-WILLOW HOME INFUSION) (OUTPATIENT)
Dept: PHARMACY | Facility: CLINIC | Age: 49
End: 2019-01-28

## 2019-01-28 NOTE — PROGRESS NOTES
This is a recent snapshot of the patient's [facility] medical record.  For current drug dose and complete information and questions, call [phone] or In Basket pool, fv home infusion ([id number])  CSN Number:  [id number]

## 2019-01-28 NOTE — PROGRESS NOTES
This is a recent snapshot of the patient's [facility] medical record.  For current drug dose and complete information and questions, call [phone] or In Basket pool, fv home infusion ([id number])  CSN Number:  [id number]

## 2019-01-28 NOTE — PROGRESS NOTES
This is a recent snapshot of the patient's [facility] medical record.  For current drug dose and complete information and questions, call [phone] or In Basket pool, fv home infusion ([id number])  CSN Number:  [id number]

## 2019-01-28 NOTE — PROGRESS NOTES
Clinic Care Coordination Contact  Care Team Conversations    RN CC spoke with patient's spouse Rose.  Patient was able to receive line cares and lab draws at [facility] on 1/25/19.  Patient has future infusion center appointments scheduled.  Patient has not scheduled his eye appointment as of today, but spouse states they will schedule this so patient can be seen on the same day as Dr. Vyas.  [facility] CC discussed with patient and spouse whether spouse would want writer to explore the possibility of patient's spouse receiving training on central line dressing changes.  Patient stated she had the contact number for the staff that would train her and she would explore this as well.    Melissa Behl BSN, RN, PHN  [facility] Care Coordinator  [phone]

## 2019-01-29 NOTE — PROGRESS NOTES
This is a recent snapshot of the patient's [facility] medical record.  For current drug dose and complete information and questions, call [phone] or In [facility] pool, fv home infusion ([id number])  CSN Number:  [id number]

## 2019-01-30 DIAGNOSIS — Z98.84 S/P BARIATRIC SURGERY: Primary | ICD-10-CM

## 2019-01-30 NOTE — PROGRESS NOTES
Clinic Care Coordination Contact  Care Team Conversations    RN CC left a voicemail for Dr. Vyas's coordinator to inquire about Dr. Vyas placing orders for patient's spouse to receive training on central line dressing changes at the [facility].    Melissa Behl BSN, RN, N  [facility] Care Coordinator  [phone]

## 2019-01-31 ENCOUNTER — HOME INFUSION (PRE-WILLOW HOME INFUSION) (OUTPATIENT)
Dept: PHARMACY | Facility: CLINIC | Age: 49
End: 2019-01-31

## 2019-01-31 DIAGNOSIS — Z98.84 S/P BARIATRIC SURGERY: Primary | ICD-10-CM

## 2019-02-01 ENCOUNTER — TELEPHONE (OUTPATIENT)
Dept: SURGERY | Facility: CLINIC | Age: 49
End: 2019-02-01

## 2019-02-01 NOTE — TELEPHONE ENCOUNTER
I received a voicemail from Jael RN Care Coordinator for [facility] on 1/31 ([phone]) regarding home care for Mr. Acevedo. Jael stated no home care agency will accept him at this time. She stated to discuss with Dr. Vyas that maybe his wife, Rose, could be trained by the [facility] staff to teach central line dressings. Patient was to be seen in clinic yesterday but rescheduled that appointment to 2/7/19. Message forwarded to Beth Thomas LPN.

## 2019-02-01 NOTE — PROGRESS NOTES
This is a recent snapshot of the patient's [facility] medical record.  For current drug dose and complete information and questions, call [phone] or In Basket pool, fv home infusion ([id number])  CSN Number:  [id number]

## 2019-02-05 ENCOUNTER — HOME INFUSION (PRE-WILLOW HOME INFUSION) (OUTPATIENT)
Dept: PHARMACY | Facility: CLINIC | Age: 49
End: 2019-02-05

## 2019-02-06 ENCOUNTER — MYC REFILL (OUTPATIENT)
Dept: FAMILY MEDICINE | Facility: CLINIC | Age: 49
End: 2019-02-06

## 2019-02-06 ENCOUNTER — MYC REFILL (OUTPATIENT)
Dept: INTERNAL MEDICINE | Facility: CLINIC | Age: 49
End: 2019-02-06

## 2019-02-06 DIAGNOSIS — E55.9 VITAMIN D DEFICIENCY: ICD-10-CM

## 2019-02-06 DIAGNOSIS — E53.8 VITAMIN B12 DEFICIENCY (NON ANEMIC): ICD-10-CM

## 2019-02-06 DIAGNOSIS — R11.0 NAUSEA: ICD-10-CM

## 2019-02-06 DIAGNOSIS — F41.9 ANXIETY: ICD-10-CM

## 2019-02-06 DIAGNOSIS — F90.0 ADHD (ATTENTION DEFICIT HYPERACTIVITY DISORDER), INATTENTIVE TYPE: ICD-10-CM

## 2019-02-06 RX ORDER — LORAZEPAM 1 MG/1
TABLET ORAL
Qty: 50 TABLET | Refills: 0 | Status: SHIPPED | OUTPATIENT
Start: 2019-02-06 | End: 2019-03-04

## 2019-02-06 RX ORDER — DEXTROAMPHETAMINE SACCHARATE, AMPHETAMINE ASPARTATE, DEXTROAMPHETAMINE SULFATE AND AMPHETAMINE SULFATE 5; 5; 5; 5 MG/1; MG/1; MG/1; MG/1
20 TABLET ORAL DAILY
Qty: 30 TABLET | Refills: 0 | Status: SHIPPED | OUTPATIENT
Start: 2019-02-06 | End: 2019-03-04

## 2019-02-06 RX ORDER — SUCRALFATE ORAL 1 G/10ML
1 SUSPENSION ORAL 4 TIMES DAILY
Qty: 1200 ML | Refills: 11 | Status: CANCELLED | OUTPATIENT
Start: 2019-02-06

## 2019-02-06 NOTE — TELEPHONE ENCOUNTER
Adderall 20 MG       Last Written Prescription Date:  12/14/18  Last Fill Quantity: 30,   # refills: 0  Last Office Visit: 10/30/18  Future Office visit:    Next 5 appointments (look out 90 days)    Mar 13, 2019  2:45 PM CDT  Return Visit with Patti Milligan MD  [facility] ([facility]) [address]  [place] [address]  [phone]           Routing refill request to provider for review/approval because:  Drug not on the [facility], [facility] or [facility] refill protocol or controlled substance  Lorazepam 1 MG       Last Written Prescription Date:  1/11/19  Last Fill Quantity: 50,   # refills: 0  Last Office Visit: 10/30/18  Future Office visit:    Next 5 appointments (look out 90 days)    Mar 13, 2019  2:45 PM CDT  Return Visit with Patti Milligan MD  [facility] ([facility]) [address]  [place] [address]  [phone]           Routing refill request to provider for review/approval because:  Drug not on the [facility], [facility] or [facility] refill protocol or controlled substance

## 2019-02-06 NOTE — TELEPHONE ENCOUNTER
Signed original RX delivered to [facility] retail Pharmacy to be delivered to [facility] pharmacy . Eva,

## 2019-02-07 ENCOUNTER — HOME INFUSION (PRE-WILLOW HOME INFUSION) (OUTPATIENT)
Dept: PHARMACY | Facility: CLINIC | Age: 49
End: 2019-02-07

## 2019-02-07 ENCOUNTER — MEDICAL CORRESPONDENCE (OUTPATIENT)
Dept: HEALTH INFORMATION MANAGEMENT | Facility: CLINIC | Age: 49
End: 2019-02-07

## 2019-02-07 NOTE — PROGRESS NOTES
This is a recent snapshot of the patient's [facility] medical record.  For current drug dose and complete information and questions, call [phone] or In Basket pool, fv home infusion ([id number])  CSN Number:  [id number]

## 2019-02-07 NOTE — TELEPHONE ENCOUNTER
Carafate    Sent 4/4/2018 with 1 year supply. Refill not appropriate at this time.     Nafisa Aponte, RN, BSN

## 2019-02-07 NOTE — TELEPHONE ENCOUNTER
Vit B 12, Ondansetron, Vit D2    Routing refill request to provider for review/approval because:  New PCP    Nafisa Aponte, RN, BSN

## 2019-02-08 ENCOUNTER — TELEPHONE (OUTPATIENT)
Dept: INTERNAL MEDICINE | Facility: CLINIC | Age: 49
End: 2019-02-08

## 2019-02-08 DIAGNOSIS — R11.0 NAUSEA: ICD-10-CM

## 2019-02-08 RX ORDER — CYANOCOBALAMIN 1000 UG/ML
1 INJECTION, SOLUTION INTRAMUSCULAR; SUBCUTANEOUS
Qty: 1 ML | Refills: 11 | Status: SHIPPED | OUTPATIENT
Start: 2019-02-08 | End: 2019-08-01

## 2019-02-08 RX ORDER — ONDANSETRON 8 MG/1
8 TABLET, ORALLY DISINTEGRATING ORAL EVERY 8 HOURS PRN
Qty: 90 TABLET | Refills: 1 | Status: SHIPPED | OUTPATIENT
Start: 2019-02-08 | End: 2019-02-27

## 2019-02-08 RX ORDER — ONDANSETRON 8 MG/1
TABLET, ORALLY DISINTEGRATING ORAL
Qty: 90 TABLET | Refills: 1 | Status: SHIPPED | OUTPATIENT
Start: 2019-02-08 | End: 2019-07-23

## 2019-02-08 RX ORDER — ERGOCALCIFEROL 1.25 MG/1
50000 CAPSULE, LIQUID FILLED ORAL WEEKLY
Qty: 12 CAPSULE | Refills: 3 | Status: SHIPPED | OUTPATIENT
Start: 2019-02-08 | End: 2019-08-01

## 2019-02-08 NOTE — TELEPHONE ENCOUNTER
PA needed for:Generic Zofran ODT 8mg  Insurance:[facility] [place]  Insur phone:[phone]  Patient ID:[id number]  Please let us know when PA is granted/denied. Thank you!  Sharon Fair, Pharmacy [facility] [place] [phone]

## 2019-02-08 NOTE — TELEPHONE ENCOUNTER
"Requested Prescriptions   Pending Prescriptions Disp Refills     ondansetron (ZOFRAN-ODT) 8 MG ODT tab [Pharmacy Med Name: ONDANSETRON 8MG ODT] 90 tablet 1     Sig: DISSOLVE ONE TABLET ON TONGUE EVERY 8 HOURS AS NEEDED FOR NAUSEA     Antivertigo/Antiemetic Agents Passed - 2/8/2019  8:08 AM       Passed - Recent (12 mo) or future (30 days) visit within the authorizing provider's specialty    Patient had office visit in the last 12 months or has a visit in the next 30 days with authorizing provider or within the authorizing provider's specialty.  See \"Patient Info\" tab in inbasket, or \"Choose Columns\" in Meds & Orders section of the refill encounter.           Passed - Medication is active on med list       Passed - Patient is 18 years of age or older        ondansetron (ZOFRAN-ODT) 8 MG ODT tab  Prescription approved per [facility] Refill Protocol.    Melva Alfredo RN, BSN     "

## 2019-02-08 NOTE — PROGRESS NOTES
This is a recent snapshot of the patient's [facility] medical record.  For current drug dose and complete information and questions, call [phone] or In Basket pool, fv home infusion ([id number])  CSN Number:  [id number]

## 2019-02-08 NOTE — TELEPHONE ENCOUNTER
Prior Authorization Retail Medication Request    Medication/Dose: Zofran ODT  ICD code (if different than what is on RX):    Previously Tried and Failed:  Dramamine,   Rationale:      Insurance Name:  See below  Insurance ID:

## 2019-02-12 DIAGNOSIS — Z79.891 ENCOUNTER FOR LONG-TERM OPIATE ANALGESIC USE: ICD-10-CM

## 2019-02-12 DIAGNOSIS — G89.29 CHRONIC ABDOMINAL PAIN: ICD-10-CM

## 2019-02-12 DIAGNOSIS — R10.9 CHRONIC ABDOMINAL PAIN: ICD-10-CM

## 2019-02-12 RX ORDER — OXYCODONE HCL 5 MG/5 ML
10-15 SOLUTION, ORAL ORAL EVERY 4 HOURS PRN
Qty: 1800 ML | Refills: 0 | Status: SHIPPED | OUTPATIENT
Start: 2019-02-12 | End: 2019-03-13

## 2019-02-12 NOTE — TELEPHONE ENCOUNTER
Medication refill information reviewed.     Due date for oxyCODONE (ROXICODONE) 5 MG/5ML solution is 2/27/19     Prescriptions prepped for review.     Will route to provider.

## 2019-02-12 NOTE — TELEPHONE ENCOUNTER
Received call from patient requesting refill(s) of oxyCODONE (ROXICODONE) 5 MG/5ML solution  Last picked up from pharmacy on 1/25/19    Pt last seen by prescribing provider on 10/29/18  Next appt scheduled for 3/13/19     checked in the past 6 months? Yes If no, print current report and give to RN    Last urine drug screen date 10/29/18  Current opioid agreement on file (completed within the last year) Yes Date of opioid agreement: 10/29/18    Processing (pick one and delete the others):      E-prescribe to [facility]   pharmacy  Corky Chatman MA  [facility]      Will route to nursing pool for review and preparation of prescription(s).

## 2019-02-12 NOTE — TELEPHONE ENCOUNTER
Script Eprescribed to pharmacy    Will send this to MA team to notify patient.    Signed Prescriptions:                        Disp   Refills    oxyCODONE (ROXICODONE) 5 MG/5ML solution   1800 mL0        Sig: Take 10-15 mLs (10-15 mg) by mouth every 4 hours as           needed for pain Max 60 mg/day. Fill on/after           2/25/19 to start on/after 2/27/19.  Authorizing Provider: BRANDYN JENKINS MD  [facility]

## 2019-02-13 NOTE — TELEPHONE ENCOUNTER
Central Prior Authorization Team   Phone: [phone]      PA Initiation    Medication: Zofran ODT  Insurance Company: [facility] - Phone [phone] Fax [phone]  Pharmacy [place] the Rx[place] [facility] [place] [address] [place] [address]  Filling Pharmacy Phone: [phone]  Filling Pharmacy Fax:    Start Date: 2/13/2019

## 2019-02-15 ENCOUNTER — PATIENT OUTREACH (OUTPATIENT)
Dept: CARE COORDINATION | Facility: CLINIC | Age: 49
End: 2019-02-15

## 2019-02-15 ENCOUNTER — TELEPHONE (OUTPATIENT)
Dept: SURGERY | Facility: CLINIC | Age: 49
End: 2019-02-15

## 2019-02-15 ASSESSMENT — ACTIVITIES OF DAILY LIVING (ADL): DEPENDENT_IADLS:: MEDICATION MANAGEMENT;TRANSPORTATION;SHOPPING

## 2019-02-15 NOTE — PROGRESS NOTES
Clinic Care Coordination Contact    Clinic Care Coordination Contact  OUTREACH    Referral Information:  Referral Source: IP Report    Primary Diagnosis: SIRS/Sepsis    Chief Complaint   Patient presents with     Clinic Care Coordination - Follow-up     RN        Universal Utilization: Patient is followed Infectious Disease, Pain Management and Bariatric Surgery  Clinic Utilization  Difficulty keeping appointments:: No  Compliance Concerns: Yes  No-Show Concerns: No  No PCP office visit in Past Year: No  Utilization    Last refreshed: 2/18/2019  8:27 AM:  Hospital Admissions 3           Last refreshed: 2/18/2019  8:27 AM:  ED Visits 4           Last refreshed: 2/18/2019  8:27 AM:  No Show Count (past year) 4              Current as of: 2/18/2019  8:27 AM              Clinical Concerns:  Current Medical Concerns:  Patient has not been able to follow up with bariatric surgery as scheduled 2/14/19 due to weather.  Spouse states she has not been able to bring patient to the infusion center due to patient's chronic pain and the weather, so she has been changing the dressing at home.  Spouse states [facility] is aware of this.  Patient's appointment with Dr. Vyas has been rescheduled for 3/7/19.  RN CC left a message for Dr. Vyas's team to inquire if Dr. Vyas would place orders for Patient [facility] to train spouse on PICC dressing changes.    Patient's appointment with infectious disease was rescheduled for 4/15/19 due to cancelling on 2/14/19 due to weather.  Patient's appointment with ophthalmology was also cancelled due to weather and rescheduled for 3/7/19.  Patient Active Problem List   Diagnosis     Peptic ulcer disease     Gastric bypass status for obesity     Chronic abdominal pain     Vomiting     Anemia     ADHD (attention deficit hyperactivity disorder), inattentive type     Vitamin B12 deficiency without anemia     Thiamine deficiency     Dehydration     Dysphagia     Weight loss,  non-intentional     Malnutrition (H)     Chronic anxiety     Constipation     Bile reflux esophagitis     Former smoker     Vitamin D deficiency     Iron deficiency     Health Care Home     Chronic pain     Insomnia     Positive blood culture     Fungemia     Chronic nausea     Gastrostomy tube in place (H)     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)     Short gut syndrome     Anxiety     Status post cervical spinal arthrodesis     Port or reservoir infection, initial encounter     Abnormal echocardiogram     Low serum iron     Anemia, iron deficiency     Catheter-related bloodstream infection (CRBSI)     Generalized weakness     S/P bariatric surgery     Hyperlipidemia LDL goal <130     Bacteremia     Infection by Candida species        Current Behavioral Concerns: Patient's ADHD and anxiety is managed by PCP.  No concerns reported this contact.    Education Provided to patient: Reviewed needed appointments with bariatric surgery, infectious disease, ophthalmology   Pain  Pain (GOAL):: Yes  Type: Chronic (>3mo)  Location of chronic pain:: chronic abdominal pain and lower back pain  Radiating: Yes  Progression: Waxing and Waning  Chronic pain severity:: 7  Limitation of routine activities due to chronic pain:: Yes  Description: Unable to perform most daily activities (chores, hobbies, social activities, driving)  Alleviating Factors: Pain Medication, Rest, Heat  Health Maintenance Reviewed: Due/Overdue   Health Maintenance Due   Topic Date Due     DTAP/TDAP/TD IMMUNIZATION (8 - Td) 01/23/2019     TOBACCO CESSATION COUNSELING Q1 YR  02/06/2019      Clinical Pathway: None    Medication Management:  Patient's spouse manages patient's medications.  Patient's infusions are managed by I.     Functional Status:  Dependent ADLs:: Ambulation-cane, Bathing  Dependent IADLs:: Medication Management, Transportation, Shopping  Bed or wheelchair confined:: No  Mobility Status: Independent    Living Situation:  Current living  arrangement:: I live in a private home with spouse  Type of residence:: Private home - stairs    Diet/Exercise/Sleep:  Diet:: Regular(But also has TPN)  Inadequate nutrition (GOAL):: No  Food Insecurity: No  Tube Feeding: No  Exercise:: Yes  Inadequate activity/exercise (GOAL):: No  Significant changes in sleep pattern (GOAL): No    Transportation:  Transportation concerns (GOAL):: No  Transportation means:: Regular car     Psychosocial:  Evangelical or spiritual beliefs that impact treatment:: No  Mental health DX:: Yes  Mental health DX how managed:: Medication  Mental health management concern (GOAL):: No  Informal Support system:: Family, Spouse     Financial/Insurance:   Financial/Insurance concerns (GOAL):: No       Resources and Interventions:  Current Resources:   List of home care services:: Skilled Nursing;   Community Resources: Home Infusion, Home Care  Supplies used at home:: None  Equipment Currently Used at Home: cane, straight, shower chair    Advance Care Plan/Directive  Advanced Care Plans/Directives on file:: Yes  Type Advanced Care Plans/Directives: Advanced Directive - On File  Advanced Care Plan/Directive Status: Not Applicable    Referrals Placed: None     Goals:   Goals        General    #1 Medical (pt-stated)     Notes - Note edited  2/15/2019  4:46 PM by Behl, Melissa K, RN    Goal Statement: Spouse will re-schedule ophthalmology appointment.  Measure of Success: Patient will schedule and attend ophthalmology appointment.  Supportive Steps to Achieve: RN CC reviewed hospital discharge instructions and provided spouse with ophthalmology [facility] scheduling number [phone].  Barriers: multiple appointments, distance from [facility]. 2/15/19: had to cancel due to bad weather, need to reschedule  Strengths: supportive spouse, care coordination  Date to Achieve By: 4/1/19  Patient expressed understanding of goal: yes         #2 Medical (pt-stated)     Notes - Note edited  2/15/2019  4:47 PM by Behl,  Jael COATES RN    Goal Statement: Spouse will schedule a follow up appointment with bariatric surgery.  Measure of Success: Patient will schedule and attend follow up appointment with bariatric surgeon Dr. Vyas.  Supportive Steps to Achieve: Spouse has phone number, reviewed discharge instructions.  Barriers: multiple appointments, distance from clinic, 2/15/19: had to cancel due to bad weather  Strengths: supportive spouse, care coordination  Date to Achieve By: 3/7/19  Patient expressed understanding of goal: yes         #3 Medical (pt-stated)     Notes - Note edited  2/15/2019  4:47 PM by Behl, Melissa K, RN    Goal Statement: Spouse will schedule follow up with infectious disease provider.  Measure of Success: Patient will schedule and attend infectious disease appointment.  Supportive Steps to Achieve: Reviewed hospital discharge instructions, spouse has phone number.  Barriers: multiple appointments, distance from clinic. 2/15/19, had to cancel due to bad weather.  Strengths: supportive spouse, care coordination  Date to Achieve By: 4/15/19  Patient expressed understanding of goal: yes                 Patient/Caregiver understanding: Patient and spouse have a difficult time with timely follow through.    Outreach Frequency: weekly  Future Appointments              Tomorrow NL INFUSION CHAIR 2 [facility] [place]    In 1 week NL INFUSION CHAIR 2 [facility] [place]    In 2 weeks NL INFUSION CHAIR 2 [facility] [place]    In 2 weeks Francis Vyas MD [facility] Surgical Weight Management, [facility]    In 2 weeks Marlyn Jenkins MD [facility], [facility]    In 3 weeks Patti Milligan MD [facility] [place], [facility]    In 1 month Mikaela Ma MD [facility] and Infectious Diseases, [facility]          Plan:   1. Patient and spouse will attend all appointments with  ophthalmology, bariatric surgery and infectious disease as scheduled.  2. Patient will attend [facility] appointments for line cares.  3. RN CC left 2nd message with [place] [facility] staff as to whether or not Dr. Vyas will refer patient to the [facility] for spouse to learn central line cares.  4. RN CC will follow up with spouse in 1-2 weeks.    Melissa Behl BSN, RN, PHN  [facility] Care Coordinator  [phone]

## 2019-02-16 ENCOUNTER — HOSPITAL ENCOUNTER (EMERGENCY)
Facility: CLINIC | Age: 49
Discharge: HOME | End: 2019-02-16
Attending: FAMILY MEDICINE | Admitting: FAMILY MEDICINE
Payer: COMMERCIAL

## 2019-02-16 ENCOUNTER — APPOINTMENT (OUTPATIENT)
Dept: CT IMAGING | Facility: CLINIC | Age: 49
End: 2019-02-16
Attending: FAMILY MEDICINE
Payer: COMMERCIAL

## 2019-02-16 VITALS
DIASTOLIC BLOOD PRESSURE: 88 MMHG | RESPIRATION RATE: 20 BRPM | OXYGEN SATURATION: 100 % | TEMPERATURE: 98.5 F | WEIGHT: 174 LBS | HEART RATE: 68 BPM | BODY MASS INDEX: 23.93 KG/M2 | SYSTOLIC BLOOD PRESSURE: 124 MMHG

## 2019-02-16 DIAGNOSIS — R10.84 ABDOMINAL PAIN, GENERALIZED: ICD-10-CM

## 2019-02-16 DIAGNOSIS — K63.89 COLON DISTENTION: ICD-10-CM

## 2019-02-16 LAB
ALBUMIN SERPL-MCNC: 3.3 G/DL (ref 3.4–5)
ALP SERPL-CCNC: 83 U/L (ref 40–150)
ALT SERPL W P-5'-P-CCNC: 27 U/L (ref 0–70)
ANION GAP SERPL CALCULATED.3IONS-SCNC: 7 MMOL/L (ref 3–14)
AST SERPL W P-5'-P-CCNC: 16 U/L (ref 0–45)
BASOPHILS # BLD AUTO: 0 10E9/L (ref 0–0.2)
BASOPHILS NFR BLD AUTO: 0.5 %
BILIRUB SERPL-MCNC: 0.3 MG/DL (ref 0.2–1.3)
BUN SERPL-MCNC: 12 MG/DL (ref 7–30)
CALCIUM SERPL-MCNC: 8.1 MG/DL (ref 8.5–10.1)
CHLORIDE SERPL-SCNC: 113 MMOL/L (ref 94–109)
CO2 SERPL-SCNC: 26 MMOL/L (ref 20–32)
CREAT SERPL-MCNC: 0.58 MG/DL (ref 0.66–1.25)
DIFFERENTIAL METHOD BLD: ABNORMAL
EOSINOPHIL NFR BLD AUTO: 1.9 %
ERYTHROCYTE [DISTWIDTH] IN BLOOD BY AUTOMATED COUNT: 13.7 % (ref 10–15)
GFR SERPL CREATININE-BSD FRML MDRD: >90 ML/MIN/{1.73_M2}
GLUCOSE SERPL-MCNC: 95 MG/DL (ref 70–99)
HCT VFR BLD AUTO: 35.8 % (ref 40–53)
HEMOCCULT STL QL: NEGATIVE
HGB BLD-MCNC: 11.8 G/DL (ref 13.3–17.7)
IMM GRANULOCYTES # BLD: 0 10E9/L (ref 0–0.4)
IMM GRANULOCYTES NFR BLD: 0.2 %
LIPASE SERPL-CCNC: 195 U/L (ref 73–393)
LYMPHOCYTES # BLD AUTO: 1.8 10E9/L (ref 0.8–5.3)
LYMPHOCYTES NFR BLD AUTO: 20.5 %
MCH RBC QN AUTO: 31.6 PG (ref 26.5–33)
MCHC RBC AUTO-ENTMCNC: 33 G/DL (ref 31.5–36.5)
MCV RBC AUTO: 96 FL (ref 78–100)
MONOCYTES # BLD AUTO: 1 10E9/L (ref 0–1.3)
MONOCYTES NFR BLD AUTO: 11.8 %
NEUTROPHILS # BLD AUTO: 5.6 10E9/L (ref 1.6–8.3)
NEUTROPHILS NFR BLD AUTO: 65.1 %
NRBC # BLD AUTO: 0 10*3/UL
NRBC BLD AUTO-RTO: 0 /100
PLATELET # BLD AUTO: 113 10E9/L (ref 150–450)
POTASSIUM SERPL-SCNC: 3.8 MMOL/L (ref 3.4–5.3)
PROT SERPL-MCNC: 6.5 G/DL (ref 6.8–8.8)
RBC # BLD AUTO: 3.74 10E12/L (ref 4.4–5.9)
SODIUM SERPL-SCNC: 146 MMOL/L (ref 133–144)
WBC # BLD AUTO: 8.6 10E9/L (ref 4–11)

## 2019-02-16 PROCEDURE — 74177 CT ABD & PELVIS W/CONTRAST: CPT

## 2019-02-16 PROCEDURE — 99285 EMERGENCY DEPT VISIT HI MDM: CPT | Mod: 25 | Performed by: FAMILY MEDICINE

## 2019-02-16 PROCEDURE — 85025 COMPLETE CBC W/AUTO DIFF WBC: CPT | Performed by: FAMILY MEDICINE

## 2019-02-16 PROCEDURE — 25000125 ZZHC RX 250: Performed by: FAMILY MEDICINE

## 2019-02-16 PROCEDURE — 96374 THER/PROPH/DIAG INJ IV PUSH: CPT | Mod: 59 | Performed by: FAMILY MEDICINE

## 2019-02-16 PROCEDURE — 80053 COMPREHEN METABOLIC PANEL: CPT | Performed by: FAMILY MEDICINE

## 2019-02-16 PROCEDURE — C9113 INJ PANTOPRAZOLE SODIUM, VIA: HCPCS | Performed by: FAMILY MEDICINE

## 2019-02-16 PROCEDURE — 25000128 H RX IP 250 OP 636: Performed by: FAMILY MEDICINE

## 2019-02-16 PROCEDURE — 83690 ASSAY OF LIPASE: CPT | Performed by: FAMILY MEDICINE

## 2019-02-16 PROCEDURE — 96375 TX/PRO/DX INJ NEW DRUG ADDON: CPT | Performed by: FAMILY MEDICINE

## 2019-02-16 PROCEDURE — 82272 OCCULT BLD FECES 1-3 TESTS: CPT | Performed by: FAMILY MEDICINE

## 2019-02-16 PROCEDURE — 99285 EMERGENCY DEPT VISIT HI MDM: CPT | Mod: Z6 | Performed by: FAMILY MEDICINE

## 2019-02-16 RX ORDER — LIDOCAINE 40 MG/G
CREAM TOPICAL
Status: DISCONTINUED | OUTPATIENT
Start: 2019-02-16 | End: 2019-02-16 | Stop reason: HOSPADM

## 2019-02-16 RX ORDER — IOPAMIDOL 755 MG/ML
500 INJECTION, SOLUTION INTRAVASCULAR ONCE
Status: COMPLETED | OUTPATIENT
Start: 2019-02-16 | End: 2019-02-16

## 2019-02-16 RX ADMIN — PANTOPRAZOLE SODIUM 40 MG: 40 INJECTION, POWDER, FOR SOLUTION INTRAVENOUS at 03:13

## 2019-02-16 RX ADMIN — HYDROMORPHONE HYDROCHLORIDE 1 MG: 1 INJECTION, SOLUTION INTRAMUSCULAR; INTRAVENOUS; SUBCUTANEOUS at 02:26

## 2019-02-16 RX ADMIN — IOPAMIDOL 85 ML: 755 INJECTION, SOLUTION INTRAVENOUS at 02:54

## 2019-02-16 RX ADMIN — SODIUM CHLORIDE 60 ML: 9 INJECTION, SOLUTION INTRAVENOUS at 02:55

## 2019-02-16 NOTE — ED PROVIDER NOTES
History     Chief Complaint   Patient presents with     Abdominal Pain     HPI  Parker Acevedo Sr. is a 48 year old male who presents to the ED tonight with right sided abdominal pain that started around 9 PM tonight.  It tends to come in waves and starts in the right upper and mid abdomen and radiates back around towards the flank.  It it is sharp and may last up to 30 minutes and then subside and go away for 5 or 10 minutes only to recur again.  He had some nausea and took some Zofran so that helped.  It is not uncommon for him to get nauseous.  No vomiting.  He does have a G-tube but he does not use it only for venting.  He vented it tonight and did notice some blood coming from the port.  He does have a history of ulcers.  Was supposed to be on Carafate but it was over 500 bucks a month so he stopped taking it.  Has been on Protonix in the past but he does not absorb that well he states.  States he has been passing flatus and had a normal bowel movement a couple days ago so does not think he is constipated.    No provocative or palliative actions.  No history of kidney stones.  He gets some occasional tightness down into the testicle but not this time.  He thinks that his gallbladder is surgically absent.  He is not sure about his appendix.  No fevers.  Here with his wife, Rose.  He took his usual oxycodone tonight but it gave him no relief.    He is status post bariatric surgery.  Gets 99% of his nutrition and hydration via a PICC line in the left arm.        Allergies:  Allergies   Allergen Reactions     Bactrim [Sulfamethoxazole W/Trimethoprim] Rash     Penicillins Anaphylaxis     Please see Antimicrobial Management Team allergy assessment note 10/10/2018. Patient reported tolerating amoxicillin.     Doxycycline Rash     Vancomycin Rash     Rash after receiving vancomycin 3/28/16 (red man's?). Tolerated with slower infusion and diphenhydramine premed.       Problem List:    Patient Active Problem  List    Diagnosis Date Noted     Infection by Candida species 01/04/2019     Priority: Medium     Bacteremia 10/10/2018     Priority: Medium     Hyperlipidemia LDL goal <130 08/07/2018     Priority: Medium     S/P bariatric surgery 07/30/2018     Priority: Medium     Generalized weakness 01/30/2018     Priority: Medium     Catheter-related bloodstream infection (CRBSI) 09/23/2017     Priority: Medium     Low serum iron 09/08/2017     Priority: Medium     Anemia, iron deficiency 09/08/2017     Priority: Medium     Abnormal echocardiogram 04/11/2017     Priority: Medium     Port or reservoir infection, initial encounter 03/16/2017     Priority: Medium     Status post cervical spinal arthrodesis 02/15/2017     Priority: Medium     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)      Priority: Medium     mild EF ~45% on rest 2/13/17, improves with stressing       Short gut syndrome      Priority: Medium     Anxiety      Priority: Medium     Gastrostomy tube in place (H) 08/09/2016     Priority: Medium     Chronic nausea 05/10/2016     Priority: Medium     Fungemia 04/11/2016     Priority: Medium     Positive blood culture 03/29/2016     Priority: Medium     Insomnia 08/13/2015     Priority: Medium     Chronic pain 07/07/2015     Priority: Medium     Patient is followed by Dr. Milligan for ongoing prescription of pain medication.  All refills should be approved by this provider, or covering partner.    Medication(s): Fentanyl 50 mcg patches every three days/ Liquid oxycodone 5 mg/5ml up to 50 mg daily.   Maximum quantity per month: as per Dr. Milligan through [facility]  Clinic visit frequency required: Q 3 months at Pain Management    Controlled substance agreement on file: Yes       Date(s): Through Pain Management    Pain Clinic evaluation in the past: Yes       Date(s):  Ongoing every three months       Location(s):  Per pain management    DIRE Total Score(s):  No flowsheet data found.    Last [facility] website  verification:  Through Pain Management   [email or web address]    Esteban Daly MD             Health Care Home 02/24/2015     Priority: Medium     Status:  Accepted  Care Coordinator:  Melissa Behl BSN, RN, PHN  [facility] Care Coordinator  [phone]     See Letters for [facility] Care Plan  Date:  April 4, 2016            Iron deficiency 05/23/2014     Priority: Medium     Vitamin D deficiency 05/22/2014     Priority: Medium     Former smoker 02/24/2014     Priority: Medium     Bile reflux esophagitis 10/16/2013     Priority: Medium     Constipation 10/01/2013     Priority: Medium     Chronic anxiety 09/25/2013     Priority: Medium     Dysphagia 09/17/2013     Priority: Medium     Weight loss, non-intentional 09/17/2013     Priority: Medium     Malnutrition (H) 09/17/2013     Priority: Medium     Dehydration 08/28/2013     Priority: Medium     Vitamin B12 deficiency without anemia 07/31/2013     Priority: Medium     Diagnosis updated by automated process. Provider to review and confirm.       Thiamine deficiency 07/31/2013     Priority: Medium     ADHD (attention deficit hyperactivity disorder), inattentive type 07/02/2013     Priority: Medium     Anemia 09/20/2012     Priority: Medium     Vomiting 06/28/2012     Priority: Medium     Chronic abdominal pain 06/01/2012     Priority: Medium     Patient is followed by ALEXANDRIA WHITLEY for ongoing prescription of narcotic pain medicine.  Med: liquid oxycodone up to 60 mg per day.   Maximum use per month:   Expected duration: ongoing, hope per surgery that will resolve with upcoming surgery  Narcotic agreement on file: YES  Clinic visit recommended: Q 3 months         Peptic ulcer disease 04/08/2010     Priority: Medium     Gastric bypass status for obesity 04/08/2010     Priority: Medium     Top weight of 492.          Past Medical History:    Past Medical History:   Diagnosis Date     ADHD (attention deficit hyperactivity disorder)      Anxiety       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)      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)      Chronic abdominal pain      Complication of anesthesia      Difficulty swallowing      Gastric ulcer, unspecified as acute or chronic, without mention of hemorrhage, perforation, or obstruction      Gastro-oesophageal reflux disease      Generalized weakness 1/30/2018     Head injury      Hiatal hernia      Other bladder disorder      Other chronic pain      PONV (postoperative nausea and vomiting)      Severe malnutrition (H)      Short gut syndrome      Tobacco abuse        Past Surgical History:    Past Surgical History:   Procedure Laterality Date     AMPUTATION       APPENDECTOMY       BACK SURGERY  11/3/2014    curve in the spine     BIOPSY LYMPH NODE CERVICAL N/A 2/20/2015    Procedure: BIOPSY LYMPH NODE CERVICAL;  Surgeon: Baron Scanlon MD;  Location: [facility] OR     C GASTRIC BYPASS,OBESE<100CM SHAYLEE-EN-Y  2002    lost 300 pounds     CHOLECYSTECTOMY       DISCECTOMY, FUSION CERVICAL ANTERIOR ONE LEVEL, COMBINED N/A 2/15/2017    Procedure: COMBINED DISCECTOMY, FUSION CERVICAL ANTERIOR ONE LEVEL;  Surgeon: Darren Campos MD;  Location: [facility] OR     ENDOSCOPIC INSERTION TUBE GASTROSTOMY  9/9/2013    Procedure: ENDOSCOPIC INSERTION TUBE GASTROSTOMY;;  Surgeon: Francis Vyas MD;  [facility]     ENDOSCOPIC ULTRASOUND UPPER GASTROINTESTINAL TRACT (GI)  4/29/2011    Procedure:ENDOSCOPIC ULTRASOUND UPPER GASTROINTESTINAL TRACT (GI); Both Procedures done Conjointly; Surgeon:NEREIDA HOUSER; [facility]     ENDOSCOPIC ULTRASOUND UPPER GASTROINTESTINAL TRACT (GI)  9/9/2013    Procedure: ENDOSCOPIC ULTRASOUND UPPER GASTROINTESTINAL TRACT (GI);  Endoscopic Ultrasound Guide Gastrostomy Tube Placement  C-arm;  Surgeon: Noe Lizarraga MD;  [facility]     ENDOSCOPY  03/25/11    EGD, [facility]     ENDOSCOPY  08/04/09    Upper Endoscopy, [facility]     ENDOSCOPY  01/05/09    Upper  Endoscopy, [facility]     ESOPHAGOSCOPY, GASTROSCOPY, DUODENOSCOPY (EGD), COMBINED  4/20/2011    Procedure:COMBINED ESOPHAGOSCOPY, GASTROSCOPY, DUODENOSCOPY (EGD); Surgeon:BLU VYAS; Location: GI     ESOPHAGOSCOPY, GASTROSCOPY, DUODENOSCOPY (EGD), COMBINED  6/15/2011    Procedure:COMBINED ESOPHAGOSCOPY, GASTROSCOPY, DUODENOSCOPY (EGD); Surgeon:BLU VYAS; Location: GI     ESOPHAGOSCOPY, GASTROSCOPY, DUODENOSCOPY (EGD), COMBINED  6/12/2013    Procedure: COMBINED ESOPHAGOSCOPY, GASTROSCOPY, DUODENOSCOPY (EGD);;  Surgeon: lBu Vyas MD;  Location:  GI     ESOPHAGOSCOPY, GASTROSCOPY, DUODENOSCOPY (EGD), COMBINED  11/22/2013    Procedure: COMBINED ESOPHAGOSCOPY, GASTROSCOPY, DUODENOSCOPY (EGD);;  Surgeon: Blu Vyas MD;  Location:  OR     ESOPHAGOSCOPY, GASTROSCOPY, DUODENOSCOPY (EGD), COMBINED  4/30/2014    Procedure: COMBINED ESOPHAGOSCOPY, GASTROSCOPY, DUODENOSCOPY (EGD);  Surgeon: Blu Vyas MD;  Location:  GI     ESOPHAGOSCOPY, GASTROSCOPY, DUODENOSCOPY (EGD), COMBINED N/A 2/20/2015    Procedure: COMBINED ESOPHAGOSCOPY, GASTROSCOPY, DUODENOSCOPY (EGD), BIOPSY SINGLE OR MULTIPLE;  Surgeon: Baron Scanlon MD;  Location:  OR     ESOPHAGOSCOPY, GASTROSCOPY, DUODENOSCOPY (EGD), COMBINED N/A 9/30/2015    Procedure: COMBINED ESOPHAGOSCOPY, GASTROSCOPY, DUODENOSCOPY (EGD);  Surgeon: Blu Vyas MD;  [facility]     GASTRECTOMY  6/22/2012    Procedure: GASTRECTOMY;  Open Approach, Excise Ulcers,Partial Gastrectomy, Esophagojejunostomy, Hiatal Hernia Repair, Extensive Lysis of Adhesions and Esaphagogastrodudenoscopy.;  Surgeon: Blu Vyas MD;  [facility]     GASTROJEJUNOSTOMY  08/26/09    Extensice enterolysis, partial resect. jejunum, part. resect gastric pouch, gastrojejunostomy anastomosis     HC ESOPH/GAS REFLUX TEST W NASAL IMPED ELECTRODE  8/5/2013    Procedure: ESOPHAGEAL IMPEDENCE FUNCTION TEST 1 HOUR OR LESS;   Surgeon: Halie Lang MD;  [facility]     HEAD & NECK SURGERY  2/15/2017    C5-C6     HERNIA REPAIR  2006    Umbilical hernia     HERNIORRHAPHY HIATAL  6/22/2012    Procedure: HERNIORRHAPHY HIATAL;;  Surgeon: Francis Vyas MD;  [facility]     HERNIORRHAPHY INGUINAL  11/22/2013    Procedure: HERNIORRHAPHY INGUINAL;;  Surgeon: Francis Vyas MD;  [facility]     INSERT PORT VASCULAR ACCESS Right 12/19/2017    Procedure: INSERT PORT VASCULAR ACCESS;  Right Chest Port Placement ;  Surgeon: Lisandro Alejandro PA-C;  [facility]     INSERT PORT VASCULAR ACCESS Right 8/2/2018    Procedure: INSERT PORT VASCULAR ACCESS;  Place single lumen tunneled central venous access catheter;  Surgeon: Guy Jamil PA-C;  [facility]     LAPAROTOMY EXPLORATORY  11/22/2013    Procedure: LAPAROTOMY EXPLORATORY;  Exploratory Laparotomy, Upper Endoscopy, Left Inguinal Hernia Repair;  Surgeon: Francis Vyas MD;  [facility]     ORTHOPEDIC SURGERY       PICC INSERTION Right 03/16/2017    5fr DL BioFlo PICC, 42cm (3cm external) in the R medial brachial vein w/ tip in the SVC RA junction.     PICC INSERTION Left 09/23/2017    5fr DL BioFlo PICC, 45cm (1cm external) in the L basilic vein w/ tip in the SVC RA junction.     SHAYLEE EN Y BOWEL  2003     SOFT TISSUE SURGERY       SOFT TISSUE SURGERY       THORACIC SURGERY       TONSILLECTOMY       TRANSESOPHAGEAL ECHOCARDIOGRAM INTRAOPERATIVE N/A 1/8/2019    Procedure: TRANSESOPHAGEAL ECHOCARDIOGRAM INTRAOPERATIVE;  Surgeon: GENERIC ANESTHESIA PROVIDER;  [facility]       Family History:    Family History   Problem Relation Age of Onset     Gastrointestinal Disease Mother         Crohns disease     Anxiety Disorder Mother      Thyroid Disease Mother         Grave's disease     Cancer Father         ear cancer-skin cancer/melanoma     Breast Cancer Maternal Grandmother      Macular Degeneration Maternal Grandfather       "Anxiety Disorder Sister      Diabetes Maternal Uncle      Breast Cancer Other      Hypertension No family hx of      Hyperlipidemia No family hx of      Cerebrovascular Disease No family hx of      Prostate Cancer No family hx of      Depression No family hx of      Anesthesia Reaction No family hx of      Asthma No family hx of      Osteoporosis No family hx of      Genetic Disorder No family hx of      Obesity No family hx of      Mental Illness No family hx of      Substance Abuse No family hx of      Glaucoma No family hx of        Social History:  Marital Status:   [2]  Social History     Tobacco Use     Smoking status: Current Some Day Smoker     Packs/day: 0.25     Years: 3.00     Pack years: 0.75     Types: Cigarettes     Last attempt to quit: 2018     Years since quittin.5     Smokeless tobacco: Never Used     Tobacco comment: I use an e cig every now and than   Substance Use Topics     Alcohol use: No     Comment: quit      Drug use: No        Medications:      acetaminophen (TYLENOL) 32 mg/mL liquid   albuterol (PROAIR HFA/PROVENTIL HFA/VENTOLIN HFA) 108 (90 Base) MCG/ACT Inhaler   amphetamine-dextroamphetamine (ADDERALL) 20 MG tablet   carvedilol (COREG) 6.25 MG tablet   lidocaine (LIDODERM) 5 % Patch   LORazepam (ATIVAN) 1 MG tablet   ondansetron (ZOFRAN-ODT) 8 MG ODT tab   oxyCODONE (ROXICODONE) 5 MG/5ML solution   sucralfate (CARAFATE) 1 GM/10ML suspension   vitamin B-12 (CYANOCOBALAMIN) 1000 MCG/ML injection   vitamin D2 (ERGOCALCIFEROL) 68935 units (1250 mcg) capsule   B-D TB SYRINGE 27G X 1/2\" 1 ML MISC   blood glucose (NO BRAND SPECIFIED) lancets standard   blood glucose (NO BRAND SPECIFIED) test strip   blood glucose monitoring (NO BRAND SPECIFIED) meter device kit   [facility] INFUSION MANAGED PATIENT   Needle, Disp, (BD DISP NEEDLES) 27G X 1/2\" MISC   ondansetron (ZOFRAN-ODT) 8 MG ODT tab   order for DME   order for DME   sodium chloride 0.9% infusion         Review of " Systems   All other systems reviewed and are negative.      Physical Exam   BP: 122/77  Pulse: 68  Heart Rate: 65  Temp: 97.9  F (36.6  C)  Resp: 20  Weight: 78.9 kg (174 lb)  SpO2: 100 %      Physical Exam   Constitutional: He is oriented to person, place, and time. He appears well-developed and well-nourished. He appears distressed (mild, laying 45 degrees onto left side).   HENT:   Oral mucosa is dry.   Eyes: EOM are normal.   Neck: Neck supple.   Cardiovascular: Normal rate and regular rhythm.   Pulmonary/Chest: Effort normal and breath sounds normal. No respiratory distress.   Abdominal: He exhibits mass ( fullness on right side). There is tenderness ( RUQ/Epi, right mid abd).   G-tube site looks good but he opens the G-tube and does get a lot of saliva with some streaks of blood that come gushing forth.   Genitourinary: Rectal exam shows no external hemorrhoid, no fissure, no mass, no tenderness, anal tone normal and guaiac negative stool.   Musculoskeletal: Normal range of motion. He exhibits no edema.   Neurological: He is alert and oriented to person, place, and time.   Skin: Skin is warm.   Psychiatric: He has a normal mood and affect.       ED Course  (with Medical Decision Making)    48-year-old status post bariatric surgery has a chronic abdominal pain but worsened tonight around 9 PM.  Comes in waves and is sharp.  Nausea improved after some Zofran.  Nausea is not unusual for him.  No vomiting.  Also had some bloody saliva when he opened his G-tube to the vent.  Has been passing flatus and had a normal bowel movement a couple of days ago.  He has tenderness in the right upper and mid abdomen and some fullness in that region as well.    We accessed his line and send labs.  Abdominal CT with IV contrast ordered to further evaluate.  Dilaudid for pain.  1 L normal saline while waiting for labs to come back.  Protonix 40 mg IV also given since he has some blood-streaked saliva venting from his G-tube and  has a history of ulcers.    I did a digital rectal exam and he has no evidence of rectal stricture.  No masses palpable.  Stool was guaiac negative.    He had large results after a tap water enema.  His abdominal exam has improved significantly.  The fullness and tenderness on the right side has resolved.  I offered to repeat a plain x-ray of his abdomen to see if the large amount of gas had subsided but he is feeling much better and would rather just go home.  He has follow-up appointments scheduled with his GI doctor and surgeon in the near future.      He should have a colonoscopy and will likely need another upper endoscopy although now he tells me that he really does not have any stomach left so they are hesitant to scope him.  His GI doctor also told him it probably is not necessary to be on a PPI or H2 blocker since he really does not have any stomach remaining.  He stopped the Carafate because of expense.      He has been having some bloody drainage from his G-tube for over 6 months now so this is not anything new.  We discussed reportable signs and when to return.  Verbal and written discharge instructions given.          Procedures               Critical Care time:  none               Results for orders placed or performed during the hospital encounter of 02/16/19 (from the past 24 hour(s))   CBC with platelets differential   Result Value Ref Range    WBC 8.6 4.0 - 11.0 10e9/L    RBC Count 3.74 (L) 4.4 - 5.9 10e12/L    Hemoglobin 11.8 (L) 13.3 - 17.7 g/dL    Hematocrit 35.8 (L) 40.0 - 53.0 %    MCV 96 78 - 100 fl    MCH 31.6 26.5 - 33.0 pg    MCHC 33.0 31.5 - 36.5 g/dL    RDW 13.7 10.0 - 15.0 %    Platelet Count 113 (L) 150 - 450 10e9/L    Diff Method Automated Method     % Neutrophils 65.1 %    % Lymphocytes 20.5 %    % Monocytes 11.8 %    % Eosinophils 1.9 %    % Basophils 0.5 %    % Immature Granulocytes 0.2 %    Nucleated RBCs 0 0 /100    Absolute Neutrophil 5.6 1.6 - 8.3 10e9/L    Absolute Lymphocytes  1.8 0.8 - 5.3 10e9/L    Absolute Monocytes 1.0 0.0 - 1.3 10e9/L    Absolute Basophils 0.0 0.0 - 0.2 10e9/L    Abs Immature Granulocytes 0.0 0 - 0.4 10e9/L    Absolute Nucleated RBC 0.0    Comprehensive metabolic panel   Result Value Ref Range    Sodium 146 (H) 133 - 144 mmol/L    Potassium 3.8 3.4 - 5.3 mmol/L    Chloride 113 (H) 94 - 109 mmol/L    Carbon Dioxide 26 20 - 32 mmol/L    Anion Gap 7 3 - 14 mmol/L    Glucose 95 70 - 99 mg/dL    Urea Nitrogen 12 7 - 30 mg/dL    Creatinine 0.58 (L) 0.66 - 1.25 mg/dL    GFR Estimate >90 >60 mL/min/[1.73_m2]    GFR Estimate If Black >90 >60 mL/min/[1.73_m2]    Calcium 8.1 (L) 8.5 - 10.1 mg/dL    Bilirubin Total 0.3 0.2 - 1.3 mg/dL    Albumin 3.3 (L) 3.4 - 5.0 g/dL    Protein Total 6.5 (L) 6.8 - 8.8 g/dL    Alkaline Phosphatase 83 40 - 150 U/L    ALT 27 0 - 70 U/L    AST 16 0 - 45 U/L   Lipase   Result Value Ref Range    Lipase 195 73 - 393 U/L   CT Abdomen Pelvis w Contrast    Narrative    CT ABDOMEN PELVIS W CONTRAST  2/16/2019 3:00 AM     HISTORY: Right-sided abdominal pain, status post gastric bypass.  Patient has G-tube with blood and history of ulcers.    TECHNIQUE: CT abdomen and pelvis with 85 mL Isovue-370 IV. Radiation  dose for this scan was reduced using automated exposure control,  adjustment of the mA and/or kV according to patient size, or iterative  reconstruction technique.    COMPARISON: 1/13/2015.    FINDINGS:  Abdomen: There is mild dependent atelectasis at the lung bases. The  heart size is normal. Small hiatal hernia. There is mild biliary  dilatation into the pancreas head which is probably normal post  cholecystectomy. The liver otherwise appears normal. The gallbladder  is absent. The spleen, pancreas, adrenal glands and kidneys are normal  in appearance. There is a G-tube in place. No abdominal or pelvic  lymph node enlargement.    Pelvis: The ascending colon and transverse colon are very distended  with gas, stool and fluid. The descending and  rectosigmoid colon are  nondilated. Transition point is in the region of the splenic flexure,  but there is no convincing obstruction. Small bowel is normal in  caliber. The appendix is normal. There is no free intraperitoneal gas  or fluid.      Impression    IMPRESSION:  1. The ascending and transverse colon are distended with gas, stool  and fluid. Distal colon is nondilated. No focal obstruction is  evident.  2. Small hiatal hernia.   Occult blood stool   Result Value Ref Range    Occult Blood Negative NEG^Negative     *Note: Due to a large number of results and/or encounters for the requested time period, some results have not been displayed. A complete set of results can be found in Results Review.       Medications   sodium chloride (PF) 0.9% PF flush 3 mL (not administered)   sodium chloride (PF) 0.9% PF flush 3 mL (not administered)   lidocaine 1 % 1 mL (not administered)   lidocaine (LMX4) kit (not administered)   sodium chloride (PF) 0.9% PF flush 10-20 mL (not administered)   sodium chloride (PF) 0.9% PF flush 10 mL (not administered)   HYDROmorphone (DILAUDID) injection 1 mg (1 mg Intravenous Given 2/16/19 0226)   pantoprazole (PROTONIX) 40 mg IV push injection (40 mg Intravenous Given 2/16/19 0313)   sodium chloride 0.9 % bag 500mL for CT scan flush use (60 mLs Intravenous Given 2/16/19 0255)   sodium chloride (PF) 0.9% PF flush 3 mL (10 mLs Intracatheter Given 2/16/19 0254)   iopamidol (ISOVUE-370) solution 500 mL (85 mLs Intravenous Given 2/16/19 0254)       Assessments & Plan      I have reviewed the nursing notes.    I have reviewed the findings, diagnosis, plan and need for follow up with the patient.          Medication List      ASK your doctor about these medications    amphetamine-dextroamphetamine 20 MG tablet  Commonly known as:  ADDERALL  20 mg, Oral, DAILY  Ask about: Should I take this medication?     * fluconazole 200 MG tablet  Commonly known as:  DIFLUCAN  400 mg, Oral, DAILY  Ask  about: Should I take this medication?     * fluconazole 200 MG tablet  Commonly known as:  DIFLUCAN  800 mg, Oral, DAILY  Ask about: Should I take this medication?     vancomycin 1,250 mg  1,250 mg, Intravenous, EVERY 8 HOURS, Through 6/16/18. Consider retiming doses to 0600, 1400, 2200 to avoid middle of the night dosing (current schedule 0200, 1000, 1800). Can achieve this by shifting doses up to 0800, 1600, 2400 for one day (6/7/18) then to goal schedule 0600, 1400, 2200 the following day (6/8/18).  Ask about: Should I take this medication?         * This list has 2 medication(s) that are the same as other medications prescribed for you. Read the directions carefully, and ask your doctor or other care provider to review them with you.                Final diagnoses:   Colon distention - No focal obstruction is evident.   Abdominal pain, generalized - right sided       2/16/2019   [facility] EMERGENCY DEPARTMENT     Collin Chahal MD  02/16/19 6711

## 2019-02-16 NOTE — ED TRIAGE NOTES
"Pt reports right sided abd pain started about 9p, describes pain as coming in waves. States pain is not like his normal abd pain, and this evening when he opened his abd port to \"vent\" it he reports blood came out of it.   "

## 2019-02-16 NOTE — ED NOTES
Pt requested some ice chips, and reported if he couldn't have them then he would like to make a deal with us for his 10 mg of oxy because he has a 2 hour drive home, no pain number was given, MD updated

## 2019-02-16 NOTE — ED AVS SNAPSHOT
[facility] Emergency Department  [address] [place] [address]  Phone:  [phone]  Fax:  [phone]                                    Parker Acevedo Sr.   MRN: [id number]    Department:  [facility] Emergency Department   Date of Visit:  2/16/2019           After Visit Summary Signature Page    I have received my discharge instructions, and my questions have been answered. I have discussed any challenges I see with this plan with the nurse or doctor.    ..........................................................................................................................................  Patient/Patient Representative Signature      ..........................................................................................................................................  Patient Representative Print Name and Relationship to Patient    ..................................................               ................................................  Date                                   Time    ..........................................................................................................................................  Reviewed by Signature/Title    ...................................................              ..............................................  Date                                               Time          22EPIC Rev 08/18

## 2019-02-16 NOTE — DISCHARGE INSTRUCTIONS
Use the MiraLAX to maintain some semblance of regularity.  May use the Fleet enemas as needed to keep things moving as well.  Please return to the ED if you develop a fever over 100.4, persistent vomiting, increasing abdominal pain/bloating, or any concerns.  Your blood work was reassuring.  Your abdominal exam improved significantly after you had results from the enema.  Follow-up with your GI doctor and the surgeon as scheduled.  It was nice visiting with both of you tonight.   I'm glad you are feeling better and hope you continue to improve.

## 2019-02-18 NOTE — TELEPHONE ENCOUNTER
PRIOR AUTHORIZATION DENIED    Medication: Zofran ODT    Denial Date: 2/18/2019    Denial Rational:  Not supported.       Appeal Information:    If you would like to appeal, please supply P/A team with a letter of medical necessity with clinical reason.

## 2019-02-19 ENCOUNTER — TELEPHONE (OUTPATIENT)
Dept: INTERNAL MEDICINE | Facility: CLINIC | Age: 49
End: 2019-02-19

## 2019-02-19 ENCOUNTER — PATIENT OUTREACH (OUTPATIENT)
Dept: CARE COORDINATION | Facility: CLINIC | Age: 49
End: 2019-02-19

## 2019-02-19 ASSESSMENT — ACTIVITIES OF DAILY LIVING (ADL): DEPENDENT_IADLS:: MEDICATION MANAGEMENT;TRANSPORTATION;SHOPPING

## 2019-02-19 NOTE — TELEPHONE ENCOUNTER
Due to patient being unsuccessful in obtaining a home care agency, [facility] recommended patient's spouse being trained by the [facility] in central line cares to perform cares at home.  RN CC inquired with Dr. Vyas's team if they would place orders.  RN CLARITZA received call back from Dr. Vyas's team member Beth informing writer they were unable to place any orders for patient at this time due to last appointment being >1 year ago, 6/1/17.    To PCP to advise if an order to [facility] for patient's spouse to learn central cares may be ordered.    Melissa Behl BSN, RN, N  [facility] Care Coordinator  [phone]

## 2019-02-19 NOTE — TELEPHONE ENCOUNTER
He needs to see Dr Vyas's team to continue to get infusion orders from them.  I won't do those orders.

## 2019-02-19 NOTE — TELEPHONE ENCOUNTER
Called and left message for CHRISTY Elder coordinator for patient Parker. Advised that we have not seen this patient in clinic since 2017. Patient has cancelled appointments several times in the last few months. Spoke with wound care RN to advise-these orders are not urgent and can wait until patient is seen on 3/7/19. Patient needs to be seen by Dr. Vyas for follow up prior to any orders being placed. Direct number given for further questions.

## 2019-02-19 NOTE — PROGRESS NOTES
Clinic Care Coordination Contact    Clinic Care Coordination Contact  OUTREACH    Referral Information:  Referral Source: IP Report    Primary Diagnosis: SIRS/Sepsis    Chief Complaint   Patient presents with     Clinic Care Coordination - Follow-up     RN        Universal Utilization: Patient is followed Infectious Disease, Pain Management and Bariatric Surgery  Clinic Utilization  Difficulty keeping appointments:: No  Compliance Concerns: Yes  No-Show Concerns: No  No PCP office visit in Past Year: No  Utilization    Last refreshed: 2/19/2019  1:04 PM:  Hospital Admissions 3           Last refreshed: 2/19/2019  1:04 PM:  ED Visits 4           Last refreshed: 2/19/2019  1:04 PM:  No Show Count (past year) 4              Current as of: 2/19/2019  1:04 PM              Clinical Concerns:  Current Medical Concerns:  Patient was in [facility] ED 2/16/19 due to abdominal pain.  Patient was treated with IV fluid and a tap water enema.  Pain today is at baseline of 7/10.  Patient will follow up with Dr. Vyas as scheduled 3/7/19.  See 2/19/19 telephone encounter.  Dr. Isaac defers central line management to Dr. Vyas.  Per 2/15/19 telephone encounter, Dr. Vyas will not place orders at this time due it being >1 year since last office visit.  Patient and spouse will address at upcoming appointment.  Spouse states she is looking for a home lab company that will come out to draw patient's labs from his central line so he will not have to go into clinic.    Patient Active Problem List   Diagnosis     Peptic ulcer disease     Gastric bypass status for obesity     Chronic abdominal pain     Vomiting     Anemia     ADHD (attention deficit hyperactivity disorder), inattentive type     Vitamin B12 deficiency without anemia     Thiamine deficiency     Dehydration     Dysphagia     Weight loss, non-intentional     Malnutrition (H)     Chronic anxiety     Constipation     Bile reflux esophagitis     Former smoker      Vitamin D deficiency     Iron deficiency     Health Care Home     Chronic pain     Insomnia     Positive blood culture     Fungemia     Chronic nausea     Gastrostomy tube in place (H)     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)     Short gut syndrome     Anxiety     Status post cervical spinal arthrodesis     Port or reservoir infection, initial encounter     Abnormal echocardiogram     Low serum iron     Anemia, iron deficiency     Catheter-related bloodstream infection (CRBSI)     Generalized weakness     S/P bariatric surgery     Hyperlipidemia LDL goal <130     Bacteremia     Infection by Candida species      Current Behavioral Concerns: No concerns reported at this time.      Education Provided to patient: RN CC reviewed Dr. Vyas and Dr. Isaac's responses regarding request for Patient Learning Center to teach central line cares to spouse.  Patient and spouse to discuss with Dr. Vyas at upcoming appointment.     Pain  Pain (GOAL):: Yes  Type: Chronic (>3mo)  Location of chronic pain:: chronic abdominal pain and lower back pain  Radiating: Yes  Progression: Waxing and Waning  Chronic pain severity:: 7  Limitation of routine activities due to chronic pain:: Yes  Description: Unable to perform most daily activities (chores, hobbies, social activities, driving)  Alleviating Factors: Pain Medication, Rest, Heat  Health Maintenance Reviewed: Due/Overdue   Health Maintenance Due   Topic Date Due     DTAP/TDAP/TD IMMUNIZATION (8 - Td) 01/23/2019     TOBACCO CESSATION COUNSELING Q1 YR  02/06/2019      Clinical Pathway: None    Medication Management:  Spouse assists with medication management.     Functional Status:  Dependent ADLs:: Ambulation-cane, Bathing  Dependent IADLs:: Medication Management, Transportation, Shopping  Bed or wheelchair confined:: No  Mobility Status: Independent    Living Situation:  Current living arrangement:: I live in a private home with spouse  Type of residence:: Private home -  stairs    Diet/Exercise/Sleep:  Diet:: Regular(But also has TPN)  Inadequate nutrition (GOAL):: No  Food Insecurity: No  Tube Feeding: No  Exercise:: Yes  Days per week of moderate to strenuous exercise (like a brisk walk): 5  On average, minutes per day of exercise at this level: 10  How intense was your typical exercise? : Light (like stretching or slow walking)  Exercise Minutes per Week: 50  Inadequate activity/exercise (GOAL):: No  Significant changes in sleep pattern (GOAL): No    Transportation:  Transportation concerns (GOAL):: No  Transportation means:: Regular car     Psychosocial:  Rastafari or spiritual beliefs that impact treatment:: No  Mental health DX:: Yes  Mental health DX how managed:: Medication  Mental health management concern (GOAL):: No  Informal Support system:: Family, Spouse     Financial/Insurance:   Financial/Insurance concerns (GOAL):: No       Resources and Interventions:  Current Resources:   List of home care services:: Skilled Nursing;   Community Resources: Home Infusion, Home Care  Supplies used at home:: None  Equipment Currently Used at Home: cane, straight, shower chair    Advance Care Plan/Directive  Advanced Care Plans/Directives on file:: Yes  Type Advanced Care Plans/Directives: Advanced Directive - On File  Advanced Care Plan/Directive Status: Not Applicable    Referrals Placed: None     Goals:   Goals        General    #1 Medical (pt-stated)     Notes - Note edited  2/15/2019  4:46 PM by Behl, Melissa K, RN    Goal Statement: Spouse will re-schedule ophthalmology appointment.  Measure of Success: Patient will schedule and attend ophthalmology appointment.  Supportive Steps to Achieve: RN CC reviewed hospital discharge instructions and provided spouse [facility]mology [facility]  sched[facility] [phone].  Barriers: multiple appointments, [facility] 2/15/19: had to cancel due to bad weather, need to reschedule  Strengths: supportive spouse, care  coordination  Date to Achieve By: 4/1/19  Patient expressed understanding of goal: yes         #2 Medical (pt-stated)     Notes - Note edited  2/15/2019  4:47 PM by Behl, Melissa K, RN    Goal Statement: Spouse will schedule a follow up appointment with bariatric surgery.  Measure of Success: Patient will schedule and attend follow up appointment with bariatric surgeon Dr. Vyas.  Supportive Steps to Achieve: Spouse has phone number, reviewed discharge instructions.  Barriers: multiple appointments, distance from clinic, 2/15/19: had to cancel due to bad weather  Strengths: supportive spouse, care coordination  Date to Achieve By: 3/7/19  Patient expressed understanding of goal: yes         #3 Medical (pt-stated)     Notes - Note edited  2/15/2019  4:47 PM by Behl, Melissa K, RN    Goal Statement: Spouse will schedule follow up with infectious disease provider.  Measure of Success: Patient will schedule and attend infectious disease appointment.  Supportive Steps to Achieve: Reviewed hospital discharge instructions, spouse has phone number.  Barriers: multiple appointments, distance from clinic. 2/15/19, had to cancel due to bad weather.  Strengths: supportive spouse, care coordination  Date to Achieve By: 4/15/19  Patient expressed understanding of goal: yes                 Patient/Caregiver understanding: Spouse and patient verbalized understanding of Dr. Vyas and Dr. Isaac's advice to be seen by Dr. Vyas prior to Patient Learning Center order being placed.    Outreach Frequency: monthly  Future Appointments              Today NL INFUSION CHAIR 2 [facility] [place]    In 1 week NL INFUSION CHAIR 2 [facility] [place]    In 2 weeks NL INFUSION CHAIR 2 [facility] [place]    In 2 weeks Francis Vyas MD [facility] Surgical Weight Management, [facility]    In 2 weeks Marlyn Jenkins MD [facility], [facility]    In 3 weeks Patti Milligan MD [facility] [place], [facility]    In 1 month Mikaela Ma MD [facility] [place] and Infectious Diseases, [facility]          Plan:   1. Patient will attend all appointments with ophthalmology, bariatric surgery and infectious disease as scheduled.  2. Patient will attend [facility] appointments for line cares.  3. RN CC will follow up with patient and spouse in 3-4 weeks.    Melissa Behl BSN, RN, PHN  [facility] Care Coordinator  [phone]

## 2019-02-19 NOTE — TELEPHONE ENCOUNTER
Noted.  See 2/19/19 patient outreach encounter.    Melissa Behl BSN, RN, PHN  [facility] Care Coordinator  [phone]

## 2019-02-21 ENCOUNTER — TELEPHONE (OUTPATIENT)
Dept: INTERNAL MEDICINE | Facility: CLINIC | Age: 49
End: 2019-02-21

## 2019-02-21 ENCOUNTER — HOME INFUSION (PRE-WILLOW HOME INFUSION) (OUTPATIENT)
Dept: PHARMACY | Facility: CLINIC | Age: 49
End: 2019-02-21

## 2019-02-21 NOTE — PROGRESS NOTES
As patient has not been in to see Dr. Vyas in >18 months, provider declines to order teaching pt spouse sterile dressing change for central line.    Due to severe weather (cold and snow), Parker has been unable to keep scheduled appointment with Dr. Vyas or at [facility] (where he is to have sterile dressing change to PICC line; he was seen at [facility] on 1/25/19).    As a result, pt spouse has been changing dressing. Pt does have appointment on at [facility] on 2/26/19 and with Dr. Vyas on 3/7/19. Pt currently pharmacy only, with hospitals providing line care supplies and TPN. hospitals unable to provide nursing in the home, due to insurance requirements--requiring pt to be homebound, for skilled nursing in the home. [facility] and [facility] have been involved previously. Due to pt not being home for scheduled appts, and pt not following treatment plan, not able to resume care. Pt has complex history, including several line infection, requiring more than 1 port replacement. At this time, waiting for pt to see provider on 3/7/19 to determine further plan for PICC dressing changes.     Perla Fuchs, RN   Nurse Manager, [facility]  [phone]

## 2019-02-22 NOTE — PROGRESS NOTES
This is a recent snapshot of the patient's [facility] medical record.  For current drug dose and complete information and questions, call [phone] or In [facility] pool, fv home infusion ([id number])  CSN Number:  [id number]

## 2019-02-22 NOTE — TELEPHONE ENCOUNTER
Reason for Call:  Other call back and Prior Auth    Detailed comments: [employer] needs a prior Auth for the Ondansetron medication.     Phone Number Patient can be reached at: Other phone number:   option 3    Best Time: any    Can we leave a detailed message on this number? NO    Call taken on 2/21/2019 at 6:10 PM by Yudi Eid

## 2019-02-27 NOTE — TELEPHONE ENCOUNTER
Prior Authorization Retail Medication Request    Medication/Dose: ondansetron (ZOFRAN-ODT) 8 MG ODT tab  ICD code (if different than what is on RX):    Previously Tried and Failed:    Rationale:      Insurance Name:  BCBS Medicare  Insurance ID:  [id number]      Pharmacy Information (if different than what is on RX)  Name:    Phone:

## 2019-02-28 ENCOUNTER — HOME INFUSION (PRE-WILLOW HOME INFUSION) (OUTPATIENT)
Dept: PHARMACY | Facility: CLINIC | Age: 49
End: 2019-02-28

## 2019-03-01 NOTE — TELEPHONE ENCOUNTER
Central Prior Authorization Team   Phone: [phone]      PA NOT NEEDED, DUPLICATE REQUEST  Medication: ondansetron (ZOFRAN-ODT) 8 MG ODT tab-DUPLICATE  Insurance Company:    Pharmacy [place]illing the Rx: [facility] [place] [address]  Filling Pharmacy Phone:    Filling Pharmacy Fax:    Start Date: 3/1/2019    This is a duplicate request.  Please see telephone encounter from 2/8/19 for the original request and denial.  If the doctor would like to appeal please use that encounter.  Have the doctor place a letter of medical necessity in patient's chart

## 2019-03-01 NOTE — PROGRESS NOTES
This is a recent snapshot of the patient's [facility] medical record.  For current drug dose and complete information and questions, call [phone] or In Basket pool, fv home infusion ([id number])  CSN Number:  [id number]

## 2019-03-04 ENCOUNTER — MYC REFILL (OUTPATIENT)
Dept: INTERNAL MEDICINE | Facility: CLINIC | Age: 49
End: 2019-03-04

## 2019-03-04 DIAGNOSIS — F41.9 ANXIETY: ICD-10-CM

## 2019-03-04 DIAGNOSIS — F90.0 ADHD (ATTENTION DEFICIT HYPERACTIVITY DISORDER), INATTENTIVE TYPE: ICD-10-CM

## 2019-03-04 DIAGNOSIS — E53.8 VITAMIN B12 DEFICIENCY (NON ANEMIC): ICD-10-CM

## 2019-03-05 ENCOUNTER — TELEPHONE (OUTPATIENT)
Dept: INTERNAL MEDICINE | Facility: CLINIC | Age: 49
End: 2019-03-05

## 2019-03-05 ENCOUNTER — INFUSION THERAPY VISIT (OUTPATIENT)
Dept: INFUSION THERAPY | Facility: CLINIC | Age: 49
End: 2019-03-05
Attending: SURGERY
Payer: COMMERCIAL

## 2019-03-05 ENCOUNTER — HOME INFUSION (PRE-WILLOW HOME INFUSION) (OUTPATIENT)
Dept: PHARMACY | Facility: CLINIC | Age: 49
End: 2019-03-05

## 2019-03-05 DIAGNOSIS — E61.1 IRON DEFICIENCY: Primary | ICD-10-CM

## 2019-03-05 DIAGNOSIS — E86.0 DEHYDRATION: ICD-10-CM

## 2019-03-05 DIAGNOSIS — E46 MALNUTRITION, UNSPECIFIED TYPE (H): ICD-10-CM

## 2019-03-05 DIAGNOSIS — D64.9 ANEMIA: ICD-10-CM

## 2019-03-05 LAB
ALBUMIN SERPL-MCNC: 3.5 G/DL (ref 3.4–5)
ALP SERPL-CCNC: 84 U/L (ref 40–150)
ALT SERPL W P-5'-P-CCNC: 21 U/L (ref 0–70)
AST SERPL W P-5'-P-CCNC: 13 U/L (ref 0–45)
BASOPHILS # BLD AUTO: 0 10E9/L (ref 0–0.2)
BASOPHILS NFR BLD AUTO: 0.5 %
BILIRUB DIRECT SERPL-MCNC: <0.1 MG/DL (ref 0–0.2)
BILIRUB SERPL-MCNC: 0.3 MG/DL (ref 0.2–1.3)
BUN SERPL-MCNC: 15 MG/DL (ref 7–30)
CALCIUM SERPL-MCNC: 8.3 MG/DL (ref 8.5–10.1)
CHLORIDE SERPL-SCNC: 108 MMOL/L (ref 94–109)
CO2 SERPL-SCNC: 31 MMOL/L (ref 20–32)
CREAT SERPL-MCNC: 0.74 MG/DL (ref 0.66–1.25)
CRP SERPL-MCNC: <2.9 MG/L (ref 0–8)
DIFFERENTIAL METHOD BLD: ABNORMAL
EOSINOPHIL NFR BLD AUTO: 3 %
ERYTHROCYTE [DISTWIDTH] IN BLOOD BY AUTOMATED COUNT: 13.8 % (ref 10–15)
FERRITIN SERPL-MCNC: 14 NG/ML (ref 26–388)
GFR SERPL CREATININE-BSD FRML MDRD: >90 ML/MIN/{1.73_M2}
GLUCOSE SERPL-MCNC: 88 MG/DL (ref 70–99)
HCT VFR BLD AUTO: 38.5 % (ref 40–53)
HGB BLD-MCNC: 12.5 G/DL (ref 13.3–17.7)
IMM GRANULOCYTES # BLD: 0 10E9/L (ref 0–0.4)
IMM GRANULOCYTES NFR BLD: 0.3 %
LYMPHOCYTES # BLD AUTO: 2.5 10E9/L (ref 0.8–5.3)
LYMPHOCYTES NFR BLD AUTO: 32.6 %
MCH RBC QN AUTO: 31.2 PG (ref 26.5–33)
MCHC RBC AUTO-ENTMCNC: 32.5 G/DL (ref 31.5–36.5)
MCV RBC AUTO: 96 FL (ref 78–100)
MONOCYTES # BLD AUTO: 0.9 10E9/L (ref 0–1.3)
MONOCYTES NFR BLD AUTO: 11.9 %
NEUTROPHILS # BLD AUTO: 4 10E9/L (ref 1.6–8.3)
NEUTROPHILS NFR BLD AUTO: 51.7 %
NRBC # BLD AUTO: 0 10*3/UL
NRBC BLD AUTO-RTO: 0 /100
PHOSPHATE SERPL-MCNC: 3.5 MG/DL (ref 2.5–4.5)
PLATELET # BLD AUTO: 133 10E9/L (ref 150–450)
POTASSIUM SERPL-SCNC: 3.7 MMOL/L (ref 3.4–5.3)
PROT SERPL-MCNC: 6.7 G/DL (ref 6.8–8.8)
RBC # BLD AUTO: 4.01 10E12/L (ref 4.4–5.9)
SODIUM SERPL-SCNC: 143 MMOL/L (ref 133–144)
TRIGL SERPL-MCNC: 91 MG/DL
WBC # BLD AUTO: 7.7 10E9/L (ref 4–11)

## 2019-03-05 PROCEDURE — 84255 ASSAY OF SELENIUM: CPT | Performed by: SURGERY

## 2019-03-05 PROCEDURE — 85025 COMPLETE CBC W/AUTO DIFF WBC: CPT | Performed by: SURGERY

## 2019-03-05 PROCEDURE — 82728 ASSAY OF FERRITIN: CPT | Performed by: SURGERY

## 2019-03-05 PROCEDURE — 83735 ASSAY OF MAGNESIUM: CPT | Performed by: SURGERY

## 2019-03-05 PROCEDURE — 80053 COMPREHEN METABOLIC PANEL: CPT | Performed by: SURGERY

## 2019-03-05 PROCEDURE — 84100 ASSAY OF PHOSPHORUS: CPT | Performed by: SURGERY

## 2019-03-05 PROCEDURE — 84630 ASSAY OF ZINC: CPT | Performed by: SURGERY

## 2019-03-05 PROCEDURE — 82525 ASSAY OF COPPER: CPT | Performed by: SURGERY

## 2019-03-05 PROCEDURE — 83785 ASSAY OF MANGANESE: CPT | Performed by: SURGERY

## 2019-03-05 PROCEDURE — 84478 ASSAY OF TRIGLYCERIDES: CPT | Performed by: SURGERY

## 2019-03-05 PROCEDURE — 36592 COLLECT BLOOD FROM PICC: CPT

## 2019-03-05 PROCEDURE — 82248 BILIRUBIN DIRECT: CPT | Performed by: SURGERY

## 2019-03-05 PROCEDURE — 86140 C-REACTIVE PROTEIN: CPT | Performed by: SURGERY

## 2019-03-05 NOTE — TELEPHONE ENCOUNTER
Reason for Call:  Form, our goal is to have forms completed with 72 hours, however, some forms may require a visit or additional information.    Type of letter, form or note:  disability and medicine    Who is the form from?: Patient    Where did the form come from: Patient or family brought in       What clinic location was the form placed at?: [facility]    Where the form was placed: 's Box    What number is listed as a contact on the form?: pts home #         Additional comments: fax when forms are complete #'s are listed on paperwork     Call taken on 3/5/2019 at 3:06 PM by Fabiana Mir

## 2019-03-05 NOTE — PROGRESS NOTES
Infusion Nursing Note:  Parker Acevedo Sr. presents today for Labs/PICC drsg change.    Patient seen by provider today: No   present during visit today: Not Applicable.    Note: Home infusion informed of PICC external length is increased by 3.5 cm from insertion date. Asked to send a message to MD regarding PICC for when patient is seen end of this week.    Intravenous Access:  PICC.  Small amount of redness at insertion site but patient denies fevers or pain.    Treatment Conditions:  Not Applicable.      Post Infusion Assessment:  Blood return noted.    Discharge Plan:   Discharge instructions reviewed with: Patient and Family.  Patient and/or family verbalized understanding of discharge instructions and all questions answered.  Patient discharged in stable condition accompanied by: self and wife.  Departure Mode: Ambulatory.    Kacie Guerrier RN

## 2019-03-05 NOTE — TELEPHONE ENCOUNTER
Reason for Call:  Form, our goal is to have forms completed with 72 hours, however, some forms may require a visit or additional information.    Type of letter, form or note:  prior authorization appeal forms     Who is the form from?: Patient    Where did the form come from: Patient or family brought in       What clinic location was the form placed at?: [facility]    Where the form was placed: given to PRADIP Mosquera     What number is listed as a contact on the form?: pts home #        Additional comments: please fax form when complete and mail copy of paperwork to patients home address    Call taken on 3/5/2019 at 3:16 PM by Fabiana Mir

## 2019-03-05 NOTE — PATIENT INSTRUCTIONS
Pt to return on 03/12/19 for PICC drsg change. Copies of medication list and upcoming appointments given prior to discharge.

## 2019-03-06 ENCOUNTER — TRANSFERRED RECORDS (OUTPATIENT)
Dept: HEALTH INFORMATION MANAGEMENT | Facility: CLINIC | Age: 49
End: 2019-03-06

## 2019-03-06 ENCOUNTER — HOME INFUSION (PRE-WILLOW HOME INFUSION) (OUTPATIENT)
Dept: PHARMACY | Facility: CLINIC | Age: 49
End: 2019-03-06

## 2019-03-06 RX ORDER — CYANOCOBALAMIN 1000 UG/ML
1 INJECTION, SOLUTION INTRAMUSCULAR; SUBCUTANEOUS
Qty: 1 ML | Refills: 11 | OUTPATIENT
Start: 2019-03-06

## 2019-03-06 RX ORDER — LORAZEPAM 1 MG/1
TABLET ORAL
Qty: 50 TABLET | Refills: 0 | Status: SHIPPED | OUTPATIENT
Start: 2019-03-06 | End: 2019-04-10

## 2019-03-06 RX ORDER — DEXTROAMPHETAMINE SACCHARATE, AMPHETAMINE ASPARTATE, DEXTROAMPHETAMINE SULFATE AND AMPHETAMINE SULFATE 5; 5; 5; 5 MG/1; MG/1; MG/1; MG/1
20 TABLET ORAL DAILY
Qty: 30 TABLET | Refills: 0 | Status: SHIPPED | OUTPATIENT
Start: 2019-03-06 | End: 2019-04-10

## 2019-03-06 NOTE — PROGRESS NOTES
This is a recent snapshot of the patient's [facility] medical record.  For current drug dose and complete information and questions, call [phone] or In Basket pool, fv home infusion ([id number])  CSN Number:  [id number]

## 2019-03-06 NOTE — TELEPHONE ENCOUNTER
Requested Prescriptions   Pending Prescriptions Disp Refills     amphetamine-dextroamphetamine (ADDERALL) 20 MG tablet 30 tablet 0    Last Written Prescription Date:  19  Last Fill Quantity: 30,  # refills: 0   Last office visit: 2019 with prescribing provider:     Future Office Visit:   Next 5 appointments (look out 90 days)    Mar 13, 2019  2:45 PM CDT  Return Visit with Patti Milligan MD  [facility] [address] [address]  [phone]          Sig: Take 1 tablet (20 mg) by mouth daily    There is no refill protocol information for this order   Routing refill request to provider for review/approval because:  Drug not on the [facility] refill protocol            LORazepam (ATIVAN) 1 MG tablet 50 tablet 0    Last Written Prescription Date:  19  Last Fill Quantity: 50,  # refills: 0   Last office visit: 2019 with prescribing provider:     Future Office Visit:   Next 5 appointments (look out 90 days)    Mar 13, 2019  2:45 PM CDT  Return Visit with Patti Milligan MD  [facility] ([facility]) [address] [id number] [phone]          Si-2 mg as needed for anxiety with TPN and medications    There is no refill protocol information for this order   Routing refill request to provider for review/approval because:  Drug not on the [facility] refill protocol            vitamin B-12 (CYANOCOBALAMIN) 1000 MCG/ML injection 1 mL 11    Last Written Prescription Date:  19  Last Fill Quantity: 1 ml,  # refills: 11   Last office visit: 2019 with prescribing provider:     Future Office Visit:   Next 5 appointments (look out 90 days)    Mar 13, 2019  2:45 PM CDT  Return Visit with Patti Milligan MD  [facility] ([facility]) [address] [id number] [phone]          Sig: Inject 1 mL (1,000 mcg)  "into the muscle every 30 days    Vitamin Supplements (Adult) Protocol Passed - 3/5/2019  3:10 PM       Passed - High dose Vitamin D not ordered       Passed - Recent (12 mo) or future (30 days) visit within the authorizing provider's specialty    Patient had office visit in the last 12 months or has a visit in the next 30 days with authorizing provider or within the authorizing provider's specialty.  See \"Patient Info\" tab in inbasket, or \"Choose Columns\" in Meds & Orders section of the refill encounter.             Passed - Medication is active on med list      DENIED  REFILLS REMAINING  Ella Hammond RN      "

## 2019-03-07 ENCOUNTER — ANCILLARY PROCEDURE (OUTPATIENT)
Dept: GENERAL RADIOLOGY | Facility: CLINIC | Age: 49
End: 2019-03-07
Attending: SURGERY
Payer: COMMERCIAL

## 2019-03-07 ENCOUNTER — HOME INFUSION (PRE-WILLOW HOME INFUSION) (OUTPATIENT)
Dept: PHARMACY | Facility: CLINIC | Age: 49
End: 2019-03-07

## 2019-03-07 ENCOUNTER — OFFICE VISIT (OUTPATIENT)
Dept: SURGERY | Facility: CLINIC | Age: 49
End: 2019-03-07
Payer: COMMERCIAL

## 2019-03-07 VITALS
HEART RATE: 73 BPM | BODY MASS INDEX: 28.31 KG/M2 | TEMPERATURE: 98.7 F | DIASTOLIC BLOOD PRESSURE: 97 MMHG | SYSTOLIC BLOOD PRESSURE: 132 MMHG | OXYGEN SATURATION: 99 % | WEIGHT: 202.2 LBS | HEIGHT: 71 IN

## 2019-03-07 DIAGNOSIS — E46 MALNUTRITION (H): ICD-10-CM

## 2019-03-07 DIAGNOSIS — E44.0 MODERATE PROTEIN-CALORIE MALNUTRITION (H): Primary | ICD-10-CM

## 2019-03-07 RX ORDER — IODIXANOL 320 MG/ML
50 INJECTION, SOLUTION INTRAVASCULAR ONCE
Status: DISCONTINUED | OUTPATIENT
Start: 2019-03-07 | End: 2019-03-07 | Stop reason: CLARIF

## 2019-03-07 RX ORDER — LIDOCAINE HYDROCHLORIDE 10 MG/ML
30 INJECTION, SOLUTION EPIDURAL; INFILTRATION; INTRACAUDAL; PERINEURAL ONCE
Status: COMPLETED | OUTPATIENT
Start: 2019-03-07 | End: 2019-03-07

## 2019-03-07 RX ORDER — LIDOCAINE HYDROCHLORIDE 10 MG/ML
30 INJECTION, SOLUTION EPIDURAL; INFILTRATION; INTRACAUDAL; PERINEURAL ONCE
Status: DISCONTINUED | OUTPATIENT
Start: 2019-03-07 | End: 2019-03-07

## 2019-03-07 RX ADMIN — LIDOCAINE HYDROCHLORIDE 2 ML: 10 INJECTION, SOLUTION EPIDURAL; INFILTRATION; INTRACAUDAL; PERINEURAL at 13:23

## 2019-03-07 ASSESSMENT — PAIN SCALES - GENERAL: PAINLEVEL: MILD PAIN (3)

## 2019-03-07 ASSESSMENT — MIFFLIN-ST. JEOR: SCORE: 1819.04

## 2019-03-07 NOTE — PROGRESS NOTES
Return Bariatric Surgery Note    RE: Parker Acevedo Sr.  MR#: [id number]  : 1970  VISIT DATE: Mar 7, 2019    Dear Dr. Isaac,    I had the pleasure of seeing your patient, Parker Acevedo, in my post-bariatric surgery assessment clinic.    CHIEF COMPLAINT: Post-bariatric surgery follow-up    HISTORY OF PRESENT ILLNESS:  Questions Regarding Prior Weight Loss Surgery Reviewed With Patient 2019   I had the following weight loss procedure: Celestino-en-y Gastric Bypass   What year was your surgery? 2003   How has your weight changed since your last visit? I have lost weight   Are you currently taking any weight loss medications? No   Do you currently have any of the following: Heartburn, acid reflux, or GERD (acid reflux disease)?   Have you been to the Emergency room since your last visit with us? Yes   Were you in the hospital since your last visit with us? Yes   Do you have any concerns today? Yes     Pt is a 48M who underwent primary gastric bypass at [facility] () with subsequent revisions to treat marginal ulceration related to smoking addiction. Over the past 8 years, he has seen Dr Vyas for complications from surgery related to marginal ulceration (requiring GJ resection), enlarged pouch and hernia, as well as chronic malnutrition requiring remnant gastrostomy. He comes into clinic wanting his Billy-Key gastrostomy changed, as well as needing a new PICC line due to displacement. He is still smoking ~6 cigarettes a day.    Rose, his wife, also asked that it be o.k. for her to learn how to change bandages; not clear exactly what is meant by this request; for sure any bandages related to the g-tube can be changed; bandages related to the PICC line need to be sterilely changed and this can be done effectively by family as well.      Weight History:     2019   What is your highest lifetime weight? 489   What is your lowest weight since surgery? (In pounds) 141     Initial Weight:  "224.5 kg (495 lb 0 oz)  Current Weight: Weight: 91.7 kg (202 lb 3.2 oz)  Cumulative weight loss (lbs): 292.8  Last Visits Weight: 78.9 kg (174 lb)    Questions Regarding Co-Morbidities and Health Concerns Reviewed With Patient 1/28/2019   Pre-diabetes: Never   Diabetes II: Never   High Blood Pressure: Never   High cholesterol: Stayed the same   Heartburn/Reflux: Worsened   Are you taking daily medication for heartburn, acid reflux, or GERD (acid reflux disease)? Yes   Sleep apnea: Stayed the same   PCOS: Never   Back pain: Worsened   Joint pain: Worsened   Lower leg swelling: Stayed the same       Eating Habits 1/28/2019   How many meals do you eat per day? 1   Do you snack between meals? No   How much food are you eating at each meal? Less than 1/2 cup   Are you able to separate your meals and liquids by at least 30 minutes? No   Are you able to avoid liquid calories? No       Exercise Questions Reviewed With Patient 1/28/2019   How often do you exercise? Less than 1 time per week   What is the duration of your exercise (in minutes)? I do not exercise.   What types of exercise do you do? walking   What keeps you from being more active?  Pain, I have recently been sick, My ability to walk or move around is limited, Too tired       Social History:      1/28/2019   Are you smoking? No   Are you drinking alcohol? No       Medications:  Current Outpatient Medications   Medication     acetaminophen (TYLENOL) 32 mg/mL liquid     albuterol (PROAIR HFA/PROVENTIL HFA/VENTOLIN HFA) 108 (90 Base) MCG/ACT Inhaler     amphetamine-dextroamphetamine (ADDERALL) 20 MG tablet     B-D TB SYRINGE 27G X 1/2\" 1 ML MISC     blood glucose (NO BRAND SPECIFIED) lancets standard     blood glucose (NO BRAND SPECIFIED) test strip     blood glucose monitoring (NO BRAND SPECIFIED) meter device kit     carvedilol (COREG) 6.25 MG tablet     [place] [facility] PATIENT     lidocaine (LIDODERM) 5 % Patch     LORazepam (ATIVAN) 1 MG tablet " "    Needle, Disp, (BD DISP NEEDLES) 27G X 1/2\" MISC     ondansetron (ZOFRAN-ODT) 8 MG ODT tab     order for DME     order for DME     oxyCODONE (ROXICODONE) 5 MG/5ML solution     sodium chloride 0.9% infusion     sucralfate (CARAFATE) 1 GM/10ML suspension     vitamin B-12 (CYANOCOBALAMIN) 1000 MCG/ML injection     vitamin D2 (ERGOCALCIFEROL) 34787 units (1250 mcg) capsule     No current facility-administered medications for this visit.          1/28/2019   Do you avoid NSAIDs such as (Ibuprofen, Aleve, Naproxen, Advil)?   Yes       ROS:  GI:      1/28/2019   Vomiting: Yes   Diarrhea: Yes   Constipation: No   Swallowing trouble: Yes   Abdominal pain: Yes   Heartburn: Yes   Rash in skin folds: No   Depression: No   Stress urinary incontinence Yes     Skin:   BAR RBS ROS - SKIN 1/28/2019   Rash in skin folds: No     Psych:      1/28/2019   Depression: No   Anxiety: Yes     Female Only: No flowsheet data found.    LABS/IMAGING/MEDICAL RECORDS REVIEW: n/a    PHYSICAL EXAMINATION:  BP (!) 132/97 (BP Location: Right arm, Patient Position: Chair, Cuff Size: Adult Large)   Pulse 73   Temp 98.7  F (37.1  C) (Oral)   Ht 1.803 m (5' 10.98\")   Wt 91.7 kg (202 lb 3.2 oz)   SpO2 99%   BMI 28.21 kg/m     aaox3  Thin male, smells of smoke  LUE PICC displaced  rrr  ctab  abd s/nt/nd w scars, +Billy-Key    ASSESSMENT AND PLAN:      1. 15 years status post gastric bypass  2. Morbid Obesity resolved- current BMI: Body mass index is 28.21 kg/m .  3. Post surgical malabsorption:   Labs ordered per protocol.   Follow food plan per dietitian recommendations.   Continue taking recommended post-op vitamins.  4. Billy-Key replaced to 18 Fr 3.5cm from 18 Fr 3cm  5. PICC replacement ordered; was dangling    Pt seen and evaluated with Dr Vyas      Sincerely,    Clif Morrow MD    I spent a total of 25 minutes face to face with Parker during today's office visit. Over 50% of this time was spent counseling the patient and/or coordinating " care.    Physician Attestation  I, Francis Vyas, saw and evaluated this patient as part of a shared visit.  I have reviewed and discussed with the advanced practice provider and/or resident their history, physical and plan.    I personally reviewed the vital signs, medications, labs and imaging.    My key history or physical exam findings: as noted; persistently requires adjunct nutrition due to tobacco addiction and narcotic bowel syndrome.    Key management decisions made by me: f/u 1 year.    PICC line replacement; g-tube replacement.    Francis Vyas MD  Date of Service (when I saw the patient): 03/07/19

## 2019-03-07 NOTE — NURSING NOTE
"(   Chief Complaint   Patient presents with     RECHECK     RBS    )    ( Weight: 91.7 kg (202 lb 3.2 oz) )  ( Height: 180.3 cm (5' 10.98\") )  ( BMI (Calculated): 28.27 )  ( Initial Weight: 224.5 kg (495 lb 0 oz) )  ( Cumulative weight loss (lbs): 292.8 )  ( Last Visits Weight: 78.9 kg (174 lb) )  ( Wt change since last visit (lbs): 28.2 )  (   )  (   )    ( BP: (!) 132/97 )  (   )  ( Temp: 98.7  F (37.1  C) )  ( Temp src: Oral )  ( Pulse: 73 )  (   )  ( SpO2: 99 % )    (   Patient Active Problem List   Diagnosis     Peptic ulcer disease     Gastric bypass status for obesity     Chronic abdominal pain     Vomiting     Anemia     ADHD (attention deficit hyperactivity disorder), inattentive type     Vitamin B12 deficiency without anemia     Thiamine deficiency     Dehydration     Dysphagia     Weight loss, non-intentional     Malnutrition (H)     Chronic anxiety     Constipation     Bile reflux esophagitis     Former smoker     Vitamin D deficiency     Iron deficiency     Health Care Home     Chronic pain     Insomnia     Positive blood culture     Fungemia     Chronic nausea     Gastrostomy tube in place (H)     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)     Short gut syndrome     Anxiety     Status post cervical spinal arthrodesis     Port or reservoir infection, initial encounter     Abnormal echocardiogram     Low serum iron     Anemia, iron deficiency     Catheter-related bloodstream infection (CRBSI)     Generalized weakness     S/P bariatric surgery     Hyperlipidemia LDL goal <130     Bacteremia     Infection by Candida species    )  (   Current Outpatient Medications   Medication Sig Dispense Refill     acetaminophen (TYLENOL) 32 mg/mL liquid Take 500 mg by mouth every 4 hours as needed for fever or mild pain       albuterol (PROAIR HFA/PROVENTIL HFA/VENTOLIN HFA) 108 (90 Base) MCG/ACT Inhaler Inhale 2 puffs into the lungs every 4 hours as needed for shortness of breath / dyspnea or wheezing 1 Inhaler 3     " "amphetamine-dextroamphetamine (ADDERALL) 20 MG tablet Take 1 tablet (20 mg) by mouth daily 30 tablet 0     B-D TB SYRINGE 27G X 1/2\" 1 ML MISC        blood glucose (NO BRAND SPECIFIED) lancets standard Use to test blood sugar 1 times daily or as directed. 100 each 1     blood glucose (NO BRAND SPECIFIED) test strip Use to test blood sugar 1 times daily or as directed. 100 strip 3     blood glucose monitoring (NO BRAND SPECIFIED) meter device kit Use to test blood sugar 1 times daily or as directed. 1 kit 0     carvedilol (COREG) 6.25 MG tablet Take 6.25 mg by mouth 2 times daily (with meals)       [place] [facility]NAGED PATIENT Contact [facility] for patient specific medication information at [phone] on admission and discharge from the hospital.  Phones are answered 24 hours a day 7 days a week 365 days a year.    Providers - Choose \"CONTINUE HOME MED (no script)\" at discharge if patient treatment with home infusion will continue.       lidocaine (LIDODERM) 5 % Patch Place 1-2 patches onto the skin every 24 hours Wear for 12 hours, remove for 12 hours.  OK to cut to better fit to size. 60 patch 6     LORazepam (ATIVAN) 1 MG tablet 1-2 mg as needed for anxiety with TPN and medications 50 tablet 0     Needle, Disp, (BD DISP NEEDLES) 27G X 1/2\" MISC 1 Device every 30 days Use for cyanocobalamin injection once q 30 days. 3 each 4     ondansetron (ZOFRAN-ODT) 8 MG ODT tab DISSOLVE ONE TABLET ON TONGUE EVERY 8 HOURS AS NEEDED FOR NAUSEA 90 tablet 1     order for DME Equipment being ordered: Bilateral knee high chronic venous insufficiency stockings--  mild-moderate pressures. 4 each 5     order for DME Injection Supplies for Vitamin B12: 3cc syringes w/ 27 gauge needles, 1/2 inch length 12 each 0     oxyCODONE (ROXICODONE) 5 MG/5ML solution Take 10-15 mLs (10-15 mg) by mouth every 4 hours as needed for pain Max 60 mg/day. Fill on/after 2/25/19 to start on/after 2/27/19. 1800 mL 0     sodium " chloride 0.9% infusion Inject 1,000-2,000 mLs into the vein as needed        sucralfate (CARAFATE) 1 GM/10ML suspension Take 10 mLs (1 g) by mouth 4 times daily (Patient taking differently: Take 1 g by mouth every 4 hours as needed (EPIGASTRIC PAIN, BILE BACKUP) ) 1200 mL 11     vitamin B-12 (CYANOCOBALAMIN) 1000 MCG/ML injection Inject 1 mL (1,000 mcg) into the muscle every 30 days 1 mL 11     vitamin D2 (ERGOCALCIFEROL) 85812 units (1250 mcg) capsule Take 1 capsule (50,000 Units) by mouth once a week 12 capsule 3    )  ( Diabetes Eval:    )    ( Pain Eval:  Mild Pain (3) )    ( Wound Eval:       )    (   History   Smoking Status     Current Some Day Smoker     Packs/day: 0.25     Years: 3.00     Types: Cigarettes     Last attempt to quit: 7/28/2018   Smokeless Tobacco     Never Used     Comment: I use an e cig every now and than    )    ( Signed By:  Jessica Zuñiga; March 7, 2019; 11:42 AM )

## 2019-03-07 NOTE — PROGRESS NOTES
Interventional Radiology Brief Post Procedure Note    Procedure:  IR PICC Placement > 5 Yrs of Age [[id number]]    Proceduralist: MOE August PA-C    Assistant: None    Time Out: Prior to the start of the procedure and with procedural staff participation, I verbally confirmed the patient s identity using two indicators, relevant allergies, that the procedure was appropriate and matched the consent or emergent situation, and that the correct equipment/implants were available. Immediately prior to starting the procedure I conducted the Time Out with the procedural staff and re-confirmed the patient s name, procedure, and site/side. (The Joint Commission universal protocol was followed.)  Yes    Medications   Medication Event Details Admin User Admin Time       Sedation: None. Local Anesthestic used    Findings: Completed over-the-wire re-placement of retracted LEFT arm PICC. A new 5 Ghanaian 44.5 cm single lumen, valved, peripherally inserted central catheter (PICC) via LEFT arm. Tip lying in the right atrium. Okay to use immediately. Dx: [email or web address]. Omero.  LOCAL <1    Estimated Blood Loss: Less than 10 ml    Fluoroscopy Time:  minute(s)    SPECIMENS: None    Complications: 1. None     Condition: Stable    Plan:     Comments: See dictated procedure note for full details.    Guy Jamil PA-C

## 2019-03-07 NOTE — TELEPHONE ENCOUNTER
Script brought to [facility] pharmacy for carrier to bring to [facility] pharmacy for the Adderall.  Lorazepam was faxed.

## 2019-03-07 NOTE — LETTER
3/7/2019       RE: Parker Acevedo Sr.  [address] [facility] [address]     Dear Colleague,    Thank you for referring your patient, Parker Acevedo Sr., to the [facility]NA[facility] at [facility]. Please see a copy of my visit note below.    Return Bariatric Surgery Note    RE: Parker Acevedo Sr.  MR#: [id number]  : 1970  VISIT DATE: Mar 7, 2019    Dear Dr. Isaac,    I had the pleasure of seeing your patient, Parker Acevedo, in my post-bariatric surgery assessment clinic.    CHIEF COMPLAINT: Post-bariatric surgery follow-up    HISTORY OF PRESENT ILLNESS:  Questions Regarding Prior Weight Loss Surgery Reviewed With Patient 2019   I had the following weight loss procedure: Celestino-en-y Gastric Bypass   What year was your surgery? 2003   How has your weight changed since your last visit? I have lost weight   Are you currently taking any weight loss medications? No   Do you currently have any of the following: Heartburn, acid reflux, or GERD (acid reflux disease)?   Have you been to the Emergency room since your last visit with us? Yes   Were you in the hospital since your last visit with us? Yes   Do you have any concerns today? Yes     Pt is a 48M who underwent primary gastric bypass at [facility] () with subsequent revisions to treat marginal ulceration related to smoking addiction. Over the past 8 years, he has seen Dr Vyas for complications from surgery related to marginal ulceration (requiring GJ resection), enlarged pouch and hernia, as well as chronic malnutrition requiring remnant gastrostomy. He comes into clinic wanting his Billy-Key gastrostomy changed, as well as needing a new PICC line due to displacement. He is still smoking ~6 cigarettes a day.    Rose, his wife, also asked that it be o.k. for her to learn how to change bandages; not clear exactly what is meant by this request; for sure any bandages related to  "the g-tube can be changed; bandages related to the PICC line need to be sterilely changed and this can be done effectively by family as well.      Weight History:     1/28/2019   What is your highest lifetime weight? 489   What is your lowest weight since surgery? (In pounds) 141     Initial Weight: 224.5 kg (495 lb 0 oz)  Current Weight: Weight: 91.7 kg (202 lb 3.2 oz)  Cumulative weight loss (lbs): 292.8  Last Visits Weight: 78.9 kg (174 lb)    Questions Regarding Co-Morbidities and Health Concerns Reviewed With Patient 1/28/2019   Pre-diabetes: Never   Diabetes II: Never   High Blood Pressure: Never   High cholesterol: Stayed the same   Heartburn/Reflux: Worsened   Are you taking daily medication for heartburn, acid reflux, or GERD (acid reflux disease)? Yes   Sleep apnea: Stayed the same   PCOS: Never   Back pain: Worsened   Joint pain: Worsened   Lower leg swelling: Stayed the same       Eating Habits 1/28/2019   How many meals do you eat per day? 1   Do you snack between meals? No   How much food are you eating at each meal? Less than 1/2 cup   Are you able to separate your meals and liquids by at least 30 minutes? No   Are you able to avoid liquid calories? No       Exercise Questions Reviewed With Patient 1/28/2019   How often do you exercise? Less than 1 time per week   What is the duration of your exercise (in minutes)? I do not exercise.   What types of exercise do you do? walking   What keeps you from being more active?  Pain, I have recently been sick, My ability to walk or move around is limited, Too tired       Social History:      1/28/2019   Are you smoking? No   Are you drinking alcohol? No       Medications:  Current Outpatient Medications   Medication     acetaminophen (TYLENOL) 32 mg/mL liquid     albuterol (PROAIR HFA/PROVENTIL HFA/VENTOLIN HFA) 108 (90 Base) MCG/ACT Inhaler     amphetamine-dextroamphetamine (ADDERALL) 20 MG tablet     B-D TB SYRINGE 27G X 1/2\" 1 ML MISC     blood glucose (NO " "BRAND SPECIFIED) lancets standard     blood glucose (NO BRAND SPECIFIED) test strip     blood glucose monitoring (NO BRAND SPECIFIED) meter device kit     carvedilol (COREG) 6.25 MG tablet     [place] [facility]NAGED PATIENT     lidocaine (LIDODERM) 5 % Patch     LORazepam (ATIVAN) 1 MG tablet     Needle, Disp, (BD DISP NEEDLES) 27G X 1/2\" MISC     ondansetron (ZOFRAN-ODT) 8 MG ODT tab     order for DME     order for DME     oxyCODONE (ROXICODONE) 5 MG/5ML solution     sodium chloride 0.9% infusion     sucralfate (CARAFATE) 1 GM/10ML suspension     vitamin B-12 (CYANOCOBALAMIN) 1000 MCG/ML injection     vitamin D2 (ERGOCALCIFEROL) 32963 units (1250 mcg) capsule     No current facility-administered medications for this visit.          1/28/2019   Do you avoid NSAIDs such as (Ibuprofen, Aleve, Naproxen, Advil)?   Yes       ROS:  GI:      1/28/2019   Vomiting: Yes   Diarrhea: Yes   Constipation: No   Swallowing trouble: Yes   Abdominal pain: Yes   Heartburn: Yes   Rash in skin folds: No   Depression: No   Stress urinary incontinence Yes     Skin:   BAR RBS ROS - SKIN 1/28/2019   Rash in skin folds: No     Psych:      1/28/2019   Depression: No   Anxiety: Yes     Female Only: No flowsheet data found.    LABS/IMAGING/MEDICAL RECORDS REVIEW: n/a    PHYSICAL EXAMINATION:  BP (!) 132/97 (BP Location: Right arm, Patient Position: Chair, Cuff Size: Adult Large)   Pulse 73   Temp 98.7  F (37.1  C) (Oral)   Ht 1.803 m (5' 10.98\")   Wt 91.7 kg (202 lb 3.2 oz)   SpO2 99%   BMI 28.21 kg/m      aaox3  Thin male, smells of smoke  LUE PICC displaced  rrr  ctab  abd s/nt/nd w scars, +Billy-Key    ASSESSMENT AND PLAN:      1. 15 years status post gastric bypass  2. Morbid Obesity resolved- current BMI: Body mass index is 28.21 kg/m .  3. Post surgical malabsorption:   Labs ordered per protocol.   Follow food plan per dietitian recommendations.   Continue taking recommended post-op vitamins.  4. Billy-Key replaced to 18 Fr " 3.5cm from 18 Fr 3cm  5. PICC replacement ordered; was dangling    Pt seen and evaluated with Dr Vyas    Sincerely,    Clif Morrow MD    I spent a total of 25 minutes face to face with Parker during today's office visit. Over 50% of this time was spent counseling the patient and/or coordinating care.    Physician Attestation  I, Francis Vyas, saw and evaluated this patient as part of a shared visit.  I have reviewed and discussed with the advanced practice provider and/or resident their history, physical and plan.    I personally reviewed the vital signs, medications, labs and imaging.    My key history or physical exam findings: as noted; persistently requires adjunct nutrition due to tobacco addiction and narcotic bowel syndrome.    Key management decisions made by me: f/u 1 year.    PICC line replacement; g-tube replacement.    Francis Vyas MD  Date of Service (when I saw the patient): 03/07/19

## 2019-03-08 ENCOUNTER — HOME INFUSION (PRE-WILLOW HOME INFUSION) (OUTPATIENT)
Dept: PHARMACY | Facility: CLINIC | Age: 49
End: 2019-03-08

## 2019-03-08 LAB
COPPER SERPL-MCNC: 111 UG/DL (ref 70–140)
SELENIUM SERPL-MCNC: 105 UG/L (ref 23–190)
ZINC SERPL-MCNC: 65 UG/DL (ref 60–120)

## 2019-03-08 NOTE — PROGRESS NOTES
This is a recent snapshot of the patient's [facility] medical record.  For current drug dose and complete information and questions, call [phone] or In Basket pool, [facility]v home infusion ([id number])  CSN Number:  [id number]

## 2019-03-08 NOTE — PROGRESS NOTES
This is a recent snapshot of the patient's [facility] medical record.  For current drug dose and complete information and questions, call [phone] or In Basket pool, [facility]v home infusion ([id number])  CSN Number:  [id number]

## 2019-03-11 ENCOUNTER — TELEPHONE (OUTPATIENT)
Dept: INTERNAL MEDICINE | Facility: CLINIC | Age: 49
End: 2019-03-11

## 2019-03-11 NOTE — TELEPHONE ENCOUNTER
Reason for Call:  Form, our goal is to have forms completed with 72 hours, however, some forms may require a visit or additional information.    Type of letter, form or note:  Home Health Certification    Who is the form from?: Home care    Where did the form come from: form was faxed in    What clinic location was the form placed at?: [facility]    Where the form was placed: Given to MA/RN    What number is listed as a contact on the form?:

## 2019-03-11 NOTE — TELEPHONE ENCOUNTER
Left message for patient to call  back.  I can not fax the form in because the patient needs to sign it first. Form is in my basket.

## 2019-03-12 ENCOUNTER — HOME INFUSION (PRE-WILLOW HOME INFUSION) (OUTPATIENT)
Dept: PHARMACY | Facility: CLINIC | Age: 49
End: 2019-03-12

## 2019-03-12 DIAGNOSIS — Z53.9 DIAGNOSIS NOT YET DEFINED: Primary | ICD-10-CM

## 2019-03-12 PROCEDURE — G0180 MD CERTIFICATION HHA PATIENT: HCPCS | Performed by: INTERNAL MEDICINE

## 2019-03-12 NOTE — PROGRESS NOTES
This is a recent snapshot of the patient's [facility] medical record.  For current drug dose and complete information and questions, call [phone] or In Basket pool, fv home infusion ([id number])  CSN Number:  [id number]

## 2019-03-13 ENCOUNTER — OFFICE VISIT (OUTPATIENT)
Dept: PALLIATIVE MEDICINE | Facility: CLINIC | Age: 49
End: 2019-03-13
Payer: COMMERCIAL

## 2019-03-13 VITALS
WEIGHT: 200 LBS | DIASTOLIC BLOOD PRESSURE: 86 MMHG | HEART RATE: 76 BPM | BODY MASS INDEX: 27.91 KG/M2 | SYSTOLIC BLOOD PRESSURE: 133 MMHG

## 2019-03-13 DIAGNOSIS — M47.816 SPONDYLOSIS OF LUMBAR REGION WITHOUT MYELOPATHY OR RADICULOPATHY: ICD-10-CM

## 2019-03-13 DIAGNOSIS — M54.2 CERVICALGIA: ICD-10-CM

## 2019-03-13 DIAGNOSIS — G89.29 CHRONIC ABDOMINAL PAIN: Primary | ICD-10-CM

## 2019-03-13 DIAGNOSIS — Z79.891 ENCOUNTER FOR LONG-TERM OPIATE ANALGESIC USE: ICD-10-CM

## 2019-03-13 DIAGNOSIS — R10.9 CHRONIC ABDOMINAL PAIN: Primary | ICD-10-CM

## 2019-03-13 DIAGNOSIS — K59.03 DRUG-INDUCED CONSTIPATION: ICD-10-CM

## 2019-03-13 PROCEDURE — 99214 OFFICE O/P EST MOD 30 MIN: CPT | Performed by: PSYCHIATRY & NEUROLOGY

## 2019-03-13 RX ORDER — OXYCODONE HCL 5 MG/5 ML
10-15 SOLUTION, ORAL ORAL EVERY 4 HOURS PRN
Qty: 1800 ML | Refills: 0 | Status: SHIPPED | OUTPATIENT
Start: 2019-03-13 | End: 2019-04-17

## 2019-03-13 RX ORDER — POLYETHYLENE GLYCOL 3350 17 G/17G
1 POWDER, FOR SOLUTION ORAL DAILY
Qty: 578 G | Refills: 11 | Status: SHIPPED | OUTPATIENT
Start: 2019-03-13 | End: 2020-03-23

## 2019-03-13 ASSESSMENT — PAIN SCALES - GENERAL: PAINLEVEL: SEVERE PAIN (6)

## 2019-03-13 NOTE — PROGRESS NOTES
"                          [facility]    Date of visit: 3/13/2019     Chief complaint:    Chief Complaint   Patient presents with     Pain       Interval history:  Parker Acevedo was last seen by me on 10/29/18    Recommendations/plan at the last visit included:  1. Discussed again risks with opioid dosing, including fentanyl patches and fever.    2. UDS today  3. Refills provided today  4. Follow up in 3 months.    Since his last visit, Parker Acevedo reports:  -had new PICC and G-tube placed  Oxycodone alone is working well.   Continues on prn lorazepam- provided by Dr Daly; takes as needed for nighttime anxiety, 3-4 times per week.  -having more back and neck pain- has not tried physical therapy for that    Pain scores:  Average pain intensity  6/10    Current pain treatments:   Oxycodone 10-15 mg liquid by G tube QID hours, total of 60 mg daily (MEDD 82.5 mg)  Lidocaine patches  Naloxone- has from previous hospitalization.    Previous medication treatments included:   Valium 5 mg/day, 1 tab in AM and PM-stopped in 11/2017  Tylenol #3   Vicodin   Tramadol   Diazepam- for sleep/anxiety  Gabapentin- bad headaches, acid reflux  Oxycodone max of 45mg/day.    Fentanyl 25mcg/hr patch q48 hours - down from 50mcg/hr    Side Effects: no side effects    Medications:  Current Outpatient Medications   Medication Sig Dispense Refill     acetaminophen (TYLENOL) 32 mg/mL liquid Take 500 mg by mouth every 4 hours as needed for fever or mild pain       albuterol (PROAIR HFA/PROVENTIL HFA/VENTOLIN HFA) 108 (90 Base) MCG/ACT Inhaler Inhale 2 puffs into the lungs every 4 hours as needed for shortness of breath / dyspnea or wheezing 1 Inhaler 3     amphetamine-dextroamphetamine (ADDERALL) 20 MG tablet Take 1 tablet (20 mg) by mouth daily 30 tablet 0     B-D TB SYRINGE 27G X 1/2\" 1 ML MISC        blood glucose (NO BRAND SPECIFIED) lancets standard Use to test blood sugar 1 times daily or as directed. 100 " "each 1     blood glucose (NO BRAND SPECIFIED) test strip Use to test blood sugar 1 times daily or as directed. 100 strip 3     blood glucose monitoring (NO BRAND SPECIFIED) meter device kit Use to test blood sugar 1 times daily or as directed. 1 kit 0     carvedilol (COREG) 6.25 MG tablet Take 6.25 mg by mouth 2 times daily (with meals)       [facility] INFUSION MANAGED PATIENT Contact [facility] for patient specific medication information at [phone] on admission and discharge from the hospital.  Phones are answered 24 hours a day 7 days a week 365 days a year.    Providers - Choose \"CONTINUE HOME MED (no script)\" at discharge if patient treatment with home infusion will continue.       lidocaine (LIDODERM) 5 % Patch Place 1-2 patches onto the skin every 24 hours Wear for 12 hours, remove for 12 hours.  OK to cut to better fit to size. 60 patch 6     LORazepam (ATIVAN) 1 MG tablet 1-2 mg as needed for anxiety with TPN and medications 50 tablet 0     naloxone (NARCAN) 4 MG/0.1ML nasal spray Spray 4 mg into one nostril alternating nostrils once as needed every 2-3 minutes until assistance arrives       Needle, Disp, (BD DISP NEEDLES) 27G X 1/2\" MISC 1 Device every 30 days Use for cyanocobalamin injection once q 30 days. 3 each 4     ondansetron (ZOFRAN-ODT) 8 MG ODT tab DISSOLVE ONE TABLET ON TONGUE EVERY 8 HOURS AS NEEDED FOR NAUSEA 90 tablet 1     order for DME Equipment being ordered: Bilateral knee high chronic venous insufficiency stockings--  mild-moderate pressures. 4 each 5     order for DME Injection Supplies for Vitamin B12: 3cc syringes w/ 27 gauge needles, 1/2 inch length 12 each 0     oxyCODONE (ROXICODONE) 5 MG/5ML solution Take 10-15 mLs (10-15 mg) by mouth every 4 hours as needed for pain Max 60 mg/day. Fill 3/22/19 to start on 3/29/19. Early fill for travel 1800 mL 0     polyethylene glycol (MIRALAX/GLYCOLAX) powder Take 17 g (1 capful) by mouth daily 578 g 11     sodium chloride " 0.9% infusion Inject 1,000-2,000 mLs into the vein as needed        vitamin B-12 (CYANOCOBALAMIN) 1000 MCG/ML injection Inject 1 mL (1,000 mcg) into the muscle every 30 days 1 mL 11     vitamin D2 (ERGOCALCIFEROL) 76341 units (1250 mcg) capsule Take 1 capsule (50,000 Units) by mouth once a week 12 capsule 3     sucralfate (CARAFATE) 1 GM/10ML suspension Take 10 mLs (1 g) by mouth 4 times daily 1200 mL 11       Medical History: any changes in medical history since they were last seen? Hospitalization, PICC change, G tube change    Review of Systems:  The 14 system ROS was reviewed from the intake questionnaire, and is positive for: abdominal pain, neck and back pain, infection  Any bowel or bladder problems:now prolonged time between BM- has miralax, doesn't use often.  Feels there is nothing going in to cause BM (on TPN)  Mood: anxiety    Physical Exam:  Blood pressure 133/86, pulse 76, weight 90.7 kg (200 lb).  General: awake, alert  Gait: Slow  MSK exam: hunched posture  Full range of motion of neck.  Pain with range of motion of low back including with flexion, extension, and extension/rotation       THE 4 A's OF OPIOID MAINTENANCE ANALGESIA    Analgesia: good, as noted in interval history    Activity: on disability    Adverse effects: none    Adherence to Rx protocol: good- [place] Prescription Monitoring Program checked 13/13/19 appropriate  Last UDS and opioid agreement 10/29/18    Assessment:   1. Chronic abdominal pain, with history of gastric bypass and later peptic ulcer   2. G tube placement- only used for venting  3. Myofascial abdominal pain, with significant guarding and poor posture  4. Opioid tolerance  5. Anxiety  6. Foot pain with tendonous injury- healed  7. Left arm weakness, consistent with radial nerve injury- improving  8. Port placement- h/o recurrent infections      Plan:   1. Physical therapy for neck and back pain   2. Refills provided today- allowed early refills for travel- he hasn't  requested this before.  3. Advised more regular BMs and use of miralax- refill provided  4. We again discussed smoking cessation and effects on his overall health, including infections  5. Follow up in 3 months.    Total time spent was 30 minutes, and more than 50% of face to face time was spent in counseling and/or coordination of care regarding physical therapy, medications.     Jessica Milligan MD  [facility]

## 2019-03-13 NOTE — PATIENT INSTRUCTIONS
1. Physical therapy [phone]  2. If the pharmacy doesn't allow you fill early, then you need to reach out to pharmacies in [place] and ask questions about if they can get the liquid version.  3. Follow up in 3 months.    ----------------------------------------------------------------  Clinic Number:  [phone]   Call this number with any questions about your care and for scheduling assistance. Calls are returned Monday through Friday between 8 AM and 4:30 PM. We usually get back to you within 2 business days depending on the issue/request.       Medication refills:    For non-narcotic medications, call your pharmacy directly to request a refill. The pharmacy will contact the [facility] for authorization. Please allow 3-4 days for these refills to be processed.     For narcotic refills, call the clinic number or send a "[employer] message. Please contact us 7-10 days before your refill is due. The message MUST include the name of the specific medication(s) requested and how you would like to receive the prescription(s). The options are as follows:    Pain Clinic staff can mail the prescription to your pharmacy. Please tell us the name of the pharmacy.    You may pick the prescription up at the Pain Clinic (tell us the location) or during a clinic visit with your pain provider    Pain Clinic staff can deliver the prescription to the [place] pharmacy in the clinic building. Please tell us the location.      We believe regular attendance is key to your success in our program.    Any time you are unable to keep your appointment we ask that you call us at least 24 hours in advance to let us know. This will allow us to offer the appointment time to another patient.

## 2019-03-13 NOTE — PROGRESS NOTES
This is a recent snapshot of the patient's [facility] medical record.  For current drug dose and complete information and questions, call [phone] or In Basket pool, fv home infusion ([id number])  CSN Number:  [id number]

## 2019-03-14 ENCOUNTER — MYC REFILL (OUTPATIENT)
Dept: FAMILY MEDICINE | Facility: CLINIC | Age: 49
End: 2019-03-14

## 2019-03-14 ENCOUNTER — HOME INFUSION (PRE-WILLOW HOME INFUSION) (OUTPATIENT)
Dept: PHARMACY | Facility: CLINIC | Age: 49
End: 2019-03-14

## 2019-03-14 ENCOUNTER — TELEPHONE (OUTPATIENT)
Dept: SURGERY | Facility: CLINIC | Age: 49
End: 2019-03-14

## 2019-03-14 DIAGNOSIS — E46 MALNUTRITION (H): ICD-10-CM

## 2019-03-14 DIAGNOSIS — Z98.84 GASTRIC BYPASS STATUS FOR OBESITY: Primary | ICD-10-CM

## 2019-03-14 DIAGNOSIS — R11.0 NAUSEA: ICD-10-CM

## 2019-03-14 DIAGNOSIS — E61.1 IRON DEFICIENCY: ICD-10-CM

## 2019-03-14 NOTE — TELEPHONE ENCOUNTER
Mitch from  Home Infusion left a voicemail 3/13. He wants to discuss Parker's wife, María, being taught how to do dressing changes. He said he thought this had been worked out but had questions. He can be reached at [phone].

## 2019-03-15 ENCOUNTER — HOME INFUSION (PRE-WILLOW HOME INFUSION) (OUTPATIENT)
Dept: PHARMACY | Facility: CLINIC | Age: 49
End: 2019-03-15

## 2019-03-15 ENCOUNTER — PATIENT OUTREACH (OUTPATIENT)
Dept: CARE COORDINATION | Facility: CLINIC | Age: 49
End: 2019-03-15

## 2019-03-15 RX ORDER — SUCRALFATE ORAL 1 G/10ML
1 SUSPENSION ORAL 4 TIMES DAILY
Qty: 1200 ML | Refills: 11 | Status: SHIPPED | OUTPATIENT
Start: 2019-03-15 | End: 2019-04-10

## 2019-03-15 ASSESSMENT — ACTIVITIES OF DAILY LIVING (ADL): DEPENDENT_IADLS:: MEDICATION MANAGEMENT;TRANSPORTATION;SHOPPING

## 2019-03-15 NOTE — PROGRESS NOTES
This is a recent snapshot of the patient's [facility] medical record.  For current drug dose and complete information and questions, call [phone] or In Basket pool, fv home infusion ([id number])  CSN Number:  [id number]

## 2019-03-15 NOTE — PROGRESS NOTES
Clinic Care Coordination Contact    Clinic Care Coordination Contact  OUTREACH    Referral Information:  Referral Source: IP Report    Primary Diagnosis: SIRS/Sepsis    Chief Complaint   Patient presents with     Clinic Care Coordination - Follow-up     RN        Universal Utilization: Patient is followed Infectious Disease, Pain Management and Bariatric Surgery  Clinic Utilization  Difficulty keeping appointments:: No  Compliance Concerns: Yes  No-Show Concerns: No  No PCP office visit in Past Year: No  Utilization    Last refreshed: 3/15/2019  1:16 PM:  Hospital Admissions 3           Last refreshed: 3/15/2019  1:16 PM:  ED Visits 4           Last refreshed: 3/15/2019  1:16 PM:  No Show Count (past year) 7              Current as of: 3/15/2019  1:16 PM              Clinical Concerns:  Current Medical Concerns:  Patient was seen by bariatric surgery 3/7/19 and patient and spouse were referred to the [facility] for the spouse to be taught how to perform patient's dressing changes.  Spouse will receive this training on 4/15/19.  Unfortunately, patient's PICC line needed to be changed following 3/7/19 appointment with bariatric surgery and patient's ophthalmology appointment needs to be rescheduled.  Spouse has not completed this yet.    RN CC reviewed with spouse that patient will be due for a follow up appointment with PCP 4/2019 and spouse stated she would call today to schedule this appointment.    Patient Active Problem List   Diagnosis     Peptic ulcer disease     Gastric bypass status for obesity     Chronic abdominal pain     Vomiting     Anemia     ADHD (attention deficit hyperactivity disorder), inattentive type     Vitamin B12 deficiency without anemia     Thiamine deficiency     Dehydration     Dysphagia     Weight loss, non-intentional     Malnutrition (H)     Chronic anxiety     Constipation     Bile reflux esophagitis     Former smoker     Vitamin D deficiency     Iron deficiency     Health  Care Home     Chronic pain     Insomnia     Positive blood culture     Fungemia     Chronic nausea     Gastrostomy tube in place (H)     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)     Short gut syndrome     Anxiety     Status post cervical spinal arthrodesis     Port or reservoir infection, initial encounter     Abnormal echocardiogram     Low serum iron     Anemia, iron deficiency     Catheter-related bloodstream infection (CRBSI)     Generalized weakness     S/P bariatric surgery     Hyperlipidemia LDL goal <130     Bacteremia     Infection by Candida species      Current Behavioral Concerns: No concerns reported at this time    Education Provided to patient: RN CC reviewed importance of scheduling ophthalmology follow up and reviewed follow up appointment with PCP needed for 4/2019.     Pain  Pain (GOAL):: Yes  Type: Chronic (>3mo)  Location of chronic pain:: chronic abdominal pain and lower back pain  Radiating: Yes  Progression: Waxing and Waning  Limitation of routine activities due to chronic pain:: Yes  Description: Unable to perform most daily activities (chores, hobbies, social activities, driving)  Alleviating Factors: Pain Medication, Rest, Heat  Health Maintenance Reviewed: Due/Overdue   Health Maintenance Due   Topic Date Due     DTAP/TDAP/TD IMMUNIZATION (9 - Td) 01/23/2019     TOBACCO CESSATION COUNSELING Q1 YR  02/06/2019      Clinical Pathway: None    Medication Management:  Spouse assists with medication management.  Patient is followed by [facility].     Functional Status:  Dependent ADLs:: Ambulation-cane, Bathing  Dependent IADLs:: Medication Management, Transportation, Shopping  Bed or wheelchair confined:: No  Mobility Status: Independent    Living Situation:  Current living arrangement:: I live in a private home with spouse  Type of residence:: Private home - stairs    Diet/Exercise/Sleep:  Diet:: Regular(But also has TPN)  Inadequate nutrition (GOAL):: No  Food Insecurity:  No  Tube Feeding: No  Exercise:: Yes  Days per week of moderate to strenuous exercise (like a brisk walk): 5  On average, minutes per day of exercise at this level: 10  How intense was your typical exercise? : Light (like stretching or slow walking)  Exercise Minutes per Week: 50  Inadequate activity/exercise (GOAL):: No  Significant changes in sleep pattern (GOAL): No    Transportation:  Transportation concerns (GOAL):: No  Transportation means:: Regular car     Psychosocial:  Jew or spiritual beliefs that impact treatment:: No  Mental health DX:: Yes  Mental health DX how managed:: Medication  Mental health management concern (GOAL):: No  Informal Support system:: Family, Spouse     Financial/Insurance:   Financial/Insurance concerns (GOAL):: No       Resources and Interventions:  Current Resources:   List of home care services:: Skilled Nursing;   Community Resources: Home Infusion, Home Care  Supplies used at home:: None  Equipment Currently Used at Home: cane, straight, shower chair    Advance Care Plan/Directive  Advanced Care Plans/Directives on file:: Yes  Type Advanced Care Plans/Directives: Advanced Directive - On File  Advanced Care Plan/Directive Status: Not Applicable    Referrals Placed: None     Goals:   Goals        General    #1 Medical (pt-stated)     Notes - Note edited  3/15/2019  3:16 PM by Behl, Melissa K, RN    Goal Statement: Spouse will re-schedule ophthalmology appointment.  Measure of Success: Patient will schedule and attend ophthalmology appointment.  Supportive Steps to Achieve: RN CC reviewed hospital discharge instructions and provided spouse with ophthalmology [facility] scheduling number [phone].  Barriers: multiple appointments, [facility] 2/15/[facility]: had to cancel due to bad weather, need to reschedule  Strengths: supportive spouse, care coordination  Date to Achieve By: 5/1/19  Patient expressed understanding of goal: yes         #2 Medical (pt-stated)     Notes -  Note edited  2/15/2019  4:47 PM by Behl, Melissa K, RN    Goal Statement: Spouse will schedule follow up with infectious disease provider.  Measure of Success: Patient will schedule and attend infectious disease appointment.  Supportive Steps to Achieve: Reviewed hospital discharge instructions, spouse has phone number.  Barriers: multiple appointments, distance from clinic. 2/15/19, had to cancel due to bad weather.  Strengths: supportive spouse, care coordination  Date to Achieve By: 4/15/19  Patient expressed understanding of goal: yes                 Patient/Caregiver understanding: Spouse has fair understanding of current plan of care, but patient and spouse have had barrier with follow through in the past.    Outreach Frequency: monthly  Future Appointments              In 1 week Melida Nash, PT [facility], [facility]    In 1 month Mikaela Ma MD [facility] and Infectious Diseases, [facility]    In 1 month Klisch, Christine M, RN;  2 114 CONSULT  [facility], [facility]    In 2 months Patti Milligan MD [facility], [facility]          Plan:   1. Patient will see infectious disease as scheduled 4/15/19.  2. Patient will schedule a follow with ophthalmology.  3. Spouse will be taught how to perform PICC line dressing changes.  4. Patient will schedule a PCP f/u appointment for April 2019.  5. RN CC will wait to follow up with patient after infectious disease appointment 4/15/19 per spouse request.    Melissa Behl BSN, RN, N  [facility] Care Coordinator  [phone]

## 2019-03-15 NOTE — TELEPHONE ENCOUNTER
Routing refill request to provider for review/approval because:  Routing to New PCP.    Jeet Oviedo, RN, BSN

## 2019-03-18 NOTE — PROGRESS NOTES
This is a recent snapshot of the patient's [facility] medical record.  For current drug dose and complete information and questions, call [phone] or In Basket pool, fv home infusion ([id number])  CSN Number:  [id number]

## 2019-03-20 DIAGNOSIS — E46 MALNUTRITION, UNSPECIFIED TYPE (H): ICD-10-CM

## 2019-03-20 DIAGNOSIS — D50.8 OTHER IRON DEFICIENCY ANEMIA: ICD-10-CM

## 2019-03-20 DIAGNOSIS — E61.1 IRON DEFICIENCY: Primary | ICD-10-CM

## 2019-03-20 DIAGNOSIS — E86.0 DEHYDRATION: ICD-10-CM

## 2019-03-20 LAB — MAGNESIUM SERPL-MCNC: NORMAL MG/DL (ref 1.6–2.3)

## 2019-03-21 ENCOUNTER — HOME INFUSION (PRE-WILLOW HOME INFUSION) (OUTPATIENT)
Dept: PHARMACY | Facility: CLINIC | Age: 49
End: 2019-03-21

## 2019-03-22 NOTE — PROGRESS NOTES
This is a recent snapshot of the patient's [facility] medical record.  For current drug dose and complete information and questions, call [phone] or In Basket pool, fv home infusion ([id number])  CSN Number:  [id number]

## 2019-04-03 ENCOUNTER — HOME INFUSION (PRE-WILLOW HOME INFUSION) (OUTPATIENT)
Dept: PHARMACY | Facility: CLINIC | Age: 49
End: 2019-04-03

## 2019-04-04 NOTE — PROGRESS NOTES
This is a recent snapshot of the patient's [facility] medical record.  For current drug dose and complete information and questions, call [phone] or In Basket pool, fv home infusion ([id number])  CSN Number:  [id number]

## 2019-04-10 ENCOUNTER — INFUSION THERAPY VISIT (OUTPATIENT)
Dept: INFUSION THERAPY | Facility: CLINIC | Age: 49
End: 2019-04-10
Attending: INTERNAL MEDICINE
Payer: COMMERCIAL

## 2019-04-10 ENCOUNTER — OFFICE VISIT (OUTPATIENT)
Dept: INTERNAL MEDICINE | Facility: CLINIC | Age: 49
End: 2019-04-10
Payer: COMMERCIAL

## 2019-04-10 VITALS
RESPIRATION RATE: 16 BRPM | DIASTOLIC BLOOD PRESSURE: 84 MMHG | TEMPERATURE: 98.7 F | HEART RATE: 84 BPM | SYSTOLIC BLOOD PRESSURE: 122 MMHG | OXYGEN SATURATION: 99 % | BODY MASS INDEX: 27.21 KG/M2 | WEIGHT: 195 LBS

## 2019-04-10 DIAGNOSIS — E53.8 VITAMIN B12 DEFICIENCY WITHOUT ANEMIA: ICD-10-CM

## 2019-04-10 DIAGNOSIS — Z98.84 S/P BARIATRIC SURGERY: ICD-10-CM

## 2019-04-10 DIAGNOSIS — E46 MALNUTRITION, UNSPECIFIED TYPE (H): ICD-10-CM

## 2019-04-10 DIAGNOSIS — E86.0 DEHYDRATION: ICD-10-CM

## 2019-04-10 DIAGNOSIS — F41.9 ANXIETY: ICD-10-CM

## 2019-04-10 DIAGNOSIS — E61.1 IRON DEFICIENCY: Primary | ICD-10-CM

## 2019-04-10 DIAGNOSIS — E61.1 LOW SERUM IRON: ICD-10-CM

## 2019-04-10 DIAGNOSIS — D50.8 OTHER IRON DEFICIENCY ANEMIA: ICD-10-CM

## 2019-04-10 DIAGNOSIS — F90.0 ADHD (ATTENTION DEFICIT HYPERACTIVITY DISORDER), INATTENTIVE TYPE: Primary | ICD-10-CM

## 2019-04-10 LAB
ALBUMIN SERPL-MCNC: 3.5 G/DL (ref 3.4–5)
ALP SERPL-CCNC: 88 U/L (ref 40–150)
ALT SERPL W P-5'-P-CCNC: 28 U/L (ref 0–70)
AST SERPL W P-5'-P-CCNC: 16 U/L (ref 0–45)
BASOPHILS # BLD AUTO: 0 10E9/L (ref 0–0.2)
BASOPHILS NFR BLD AUTO: 0.4 %
BILIRUB DIRECT SERPL-MCNC: <0.1 MG/DL (ref 0–0.2)
BILIRUB SERPL-MCNC: 0.5 MG/DL (ref 0.2–1.3)
BUN SERPL-MCNC: 9 MG/DL (ref 7–30)
CALCIUM SERPL-MCNC: 8.1 MG/DL (ref 8.5–10.1)
CHLORIDE SERPL-SCNC: 110 MMOL/L (ref 94–109)
CO2 SERPL-SCNC: 24 MMOL/L (ref 20–32)
CREAT SERPL-MCNC: 0.61 MG/DL (ref 0.66–1.25)
DIFFERENTIAL METHOD BLD: ABNORMAL
EOSINOPHIL NFR BLD AUTO: 1.4 %
ERYTHROCYTE [DISTWIDTH] IN BLOOD BY AUTOMATED COUNT: 13 % (ref 10–15)
GFR SERPL CREATININE-BSD FRML MDRD: >90 ML/MIN/{1.73_M2}
GLUCOSE SERPL-MCNC: 99 MG/DL (ref 70–99)
HCT VFR BLD AUTO: 41.8 % (ref 40–53)
HGB BLD-MCNC: 13.9 G/DL (ref 13.3–17.7)
IMM GRANULOCYTES # BLD: 0 10E9/L (ref 0–0.4)
IMM GRANULOCYTES NFR BLD: 0.4 %
LYMPHOCYTES # BLD AUTO: 1.5 10E9/L (ref 0.8–5.3)
LYMPHOCYTES NFR BLD AUTO: 21.2 %
MAGNESIUM SERPL-MCNC: 2 MG/DL (ref 1.6–2.3)
MCH RBC QN AUTO: 31.7 PG (ref 26.5–33)
MCHC RBC AUTO-ENTMCNC: 33.3 G/DL (ref 31.5–36.5)
MCV RBC AUTO: 95 FL (ref 78–100)
MONOCYTES # BLD AUTO: 0.6 10E9/L (ref 0–1.3)
MONOCYTES NFR BLD AUTO: 8.5 %
NEUTROPHILS # BLD AUTO: 4.9 10E9/L (ref 1.6–8.3)
NEUTROPHILS NFR BLD AUTO: 68.1 %
NRBC # BLD AUTO: 0 10*3/UL
NRBC BLD AUTO-RTO: 0 /100
PHOSPHATE SERPL-MCNC: 2.8 MG/DL (ref 2.5–4.5)
PLATELET # BLD AUTO: 171 10E9/L (ref 150–450)
POTASSIUM SERPL-SCNC: 3.7 MMOL/L (ref 3.4–5.3)
PROT SERPL-MCNC: 6.9 G/DL (ref 6.8–8.8)
RBC # BLD AUTO: 4.38 10E12/L (ref 4.4–5.9)
SODIUM SERPL-SCNC: 143 MMOL/L (ref 133–144)
TRIGL SERPL-MCNC: 88 MG/DL
WBC # BLD AUTO: 7.2 10E9/L (ref 4–11)

## 2019-04-10 PROCEDURE — 83735 ASSAY OF MAGNESIUM: CPT | Performed by: SURGERY

## 2019-04-10 PROCEDURE — 83785 ASSAY OF MANGANESE: CPT | Performed by: SURGERY

## 2019-04-10 PROCEDURE — 85025 COMPLETE CBC W/AUTO DIFF WBC: CPT | Performed by: SURGERY

## 2019-04-10 PROCEDURE — 96372 THER/PROPH/DIAG INJ SC/IM: CPT | Performed by: INTERNAL MEDICINE

## 2019-04-10 PROCEDURE — 82248 BILIRUBIN DIRECT: CPT | Performed by: SURGERY

## 2019-04-10 PROCEDURE — 84478 ASSAY OF TRIGLYCERIDES: CPT | Performed by: SURGERY

## 2019-04-10 PROCEDURE — 80053 COMPREHEN METABOLIC PANEL: CPT | Performed by: SURGERY

## 2019-04-10 PROCEDURE — 99214 OFFICE O/P EST MOD 30 MIN: CPT | Mod: 25 | Performed by: INTERNAL MEDICINE

## 2019-04-10 PROCEDURE — 84100 ASSAY OF PHOSPHORUS: CPT | Performed by: SURGERY

## 2019-04-10 PROCEDURE — 36592 COLLECT BLOOD FROM PICC: CPT

## 2019-04-10 RX ORDER — CYANOCOBALAMIN 1000 UG/ML
1000 INJECTION, SOLUTION INTRAMUSCULAR; SUBCUTANEOUS ONCE
Status: COMPLETED | OUTPATIENT
Start: 2019-04-10 | End: 2019-04-10

## 2019-04-10 RX ORDER — DEXTROAMPHETAMINE SACCHARATE, AMPHETAMINE ASPARTATE, DEXTROAMPHETAMINE SULFATE AND AMPHETAMINE SULFATE 5; 5; 5; 5 MG/1; MG/1; MG/1; MG/1
20 TABLET ORAL DAILY
Qty: 30 TABLET | Refills: 0 | Status: SHIPPED | OUTPATIENT
Start: 2019-04-10 | End: 2019-05-01

## 2019-04-10 RX ORDER — LORAZEPAM 1 MG/1
TABLET ORAL
Qty: 50 TABLET | Refills: 0 | Status: SHIPPED | OUTPATIENT
Start: 2019-04-10 | End: 2019-05-01

## 2019-04-10 RX ADMIN — CYANOCOBALAMIN 1000 MCG: 1000 INJECTION, SOLUTION INTRAMUSCULAR; SUBCUTANEOUS at 14:18

## 2019-04-10 ASSESSMENT — PAIN SCALES - GENERAL: PAINLEVEL: MODERATE PAIN (4)

## 2019-04-10 NOTE — LETTER
[address] [address]  Phone: [phone]    April 10, 2019        Parker Acevedo Sr.  [address] [facility] [address]          To whom it may concern:    RE: Parker Acevedo Sr.    Patient was seen and treated today at our clinic.  He needs to have the [place] drainage kit for his G tube and bloating from previous gastric surgery.  Drains bile and gas on average every other day. 15 per month.    Please contact me for questions or concerns.      Sincerely,        Chuy Isaac MD

## 2019-04-10 NOTE — NURSING NOTE
Prior to injection, verified patient identity using patient's name and date of birth.  Due to injection administration, patient instructed to remain in clinic for 15 minutes  afterwards, and to report any adverse reaction to me immediately.    B12    Drug Amount Wasted:  None.  Vial/Syringe: Single dose vial  Expiration Date:  08/20  Marci Fulton CMA

## 2019-04-10 NOTE — PROGRESS NOTES
SUBJECTIVE:   Parker Acevedo Sr. is a 48 year old male who presents to clinic today for the following   health issues:    Chief Complaint   Patient presents with     Recheck Medication     Adderall and Ativan       Needs COLIN set for G tube drainage.  Does this every other day for bloating.      Labs were drawn in infusion today.  TPN continues forever.  Left side PICC.      Pain clinic and Dr Milligan does his oxycodone, no more fentanyl.      Takes lorazapam for anxiety and takes it at night to help sleep with TPN and infusions.  Does #50 for month, needs refill.    Adderall for the last 4 years for ADHD.  Takes 20mg a day a day, needs refill. Does it in the morning.      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)      Chronic abdominal pain      Complication of anesthesia      Difficulty swallowing      Gastric ulcer, unspecified as acute or chronic, without mention of hemorrhage, perforation, or obstruction      Gastro-oesophageal reflux disease      Generalized weakness 1/30/2018     Head injury      Hiatal hernia      Other bladder disorder      Other chronic pain      PONV (postoperative nausea and vomiting)      Severe malnutrition (H)     TPN     Short gut syndrome      Tobacco abuse      Current Outpatient Medications   Medication     albuterol (PROAIR HFA/PROVENTIL HFA/VENTOLIN HFA) 108 (90 Base) MCG/ACT Inhaler     amphetamine-dextroamphetamine (ADDERALL) 20 MG tablet     carvedilol (COREG) 6.25 MG tablet     lidocaine (LIDODERM) 5 % Patch     LORazepam (ATIVAN) 1 MG tablet     oxyCODONE (ROXICODONE) 5 MG/5ML solution     polyethylene glycol (MIRALAX/GLYCOLAX) powder     sodium chloride 0.9% infusion     vitamin B-12 (CYANOCOBALAMIN) 1000 MCG/ML injection     vitamin D2  "(ERGOCALCIFEROL) 88571 units (1250 mcg) capsule     acetaminophen (TYLENOL) 32 mg/mL liquid     B-D TB SYRINGE 27G X 1/2\" 1 ML MISC     blood glucose (NO BRAND SPECIFIED) lancets standard     blood glucose (NO BRAND SPECIFIED) test strip     blood glucose monitoring (NO BRAND SPECIFIED) meter device kit     [facility] MANAGED PATIENT     naloxone (NARCAN) 4 MG/0.1ML nasal spray     Needle, Disp, (BD DISP NEEDLES) 27G X 1/2\" MISC     ondansetron (ZOFRAN-ODT) 8 MG ODT tab     order for DME     order for DME     Current Facility-Administered Medications   Medication     cyanocobalamin injection 1,000 mcg     Facility-Administered Medications Ordered in Other Visits   Medication     INFUSION HYPERSENSITIVITY     sodium chloride (PF) 0.9% PF flush 10 mL     Social History     Tobacco Use     Smoking status: Current Some Day Smoker     Packs/day: 0.25     Years: 3.00     Pack years: 0.75     Types: Cigarettes     Last attempt to quit: 2018     Years since quittin.7     Smokeless tobacco: Never Used     Tobacco comment: I use an e cig every now and than   Substance Use Topics     Alcohol use: No     Comment: quit      Drug use: No     Review of Systems  Constitutional-No fevers, chills, or weight changes..  Cardiac-No chest pain or palpitations.  Respiratory-No cough, sob, or hemoptysis.  Musculoskeletal-+ myalgias and +joint pains.    Physical Exam  /84   Pulse 84   Temp 98.7  F (37.1  C) (Temporal)   Resp 16   Wt 88.5 kg (195 lb)   SpO2 99%   BMI 27.21 kg/m    General Appearance-healthy, alert, no distress  Cardiac-regular rate and rhythm  with normal S1, S2 ; no murmur, rub or gallops  Lungs-clear to auscultation  GI-g tube is present, scars  Left arm with PICC    ASSESSMENT:  This is a 48-year-old gentleman with a history of a gastric surgery which went bad, he has significant bloating and GI upset, he has a G-tube which is used for drainage of bile and gas and sometimes blood.  He " is completely TPN dependent.  He is unable to take anything orally.  He gets TPN through his PICC line and just had labs done today.  This is run by his GI surgeon through the [place].    He does need a Jim set up which he uses every other day and have done a letter and sent the orders for that.    The patient is here for ADHD renewal we will continue on Adderall 20 mg a day to sleep.  He has been stable on this for 4 years.    Ativan he takes 1-2 pills daily to help with his anxiety and the need for him to calm down when he does his infusions at night.  We will continue to refill him at 50 pills for 30 days.  Refills are done today.    Electronically signed by Chuy Isaac MD

## 2019-04-11 ENCOUNTER — HOME INFUSION (PRE-WILLOW HOME INFUSION) (OUTPATIENT)
Dept: PHARMACY | Facility: CLINIC | Age: 49
End: 2019-04-11

## 2019-04-12 NOTE — PROGRESS NOTES
This is a recent snapshot of the patient's [facility] medical record.  For current drug dose and complete information and questions, call [phone] or In Basket pool, fv home infusion ([id number])  CSN Number:  [id number]

## 2019-04-15 ENCOUNTER — OFFICE VISIT (OUTPATIENT)
Dept: EDUCATION SERVICES | Facility: CLINIC | Age: 49
End: 2019-04-15
Attending: STUDENT IN AN ORGANIZED HEALTH CARE EDUCATION/TRAINING PROGRAM
Payer: COMMERCIAL

## 2019-04-15 DIAGNOSIS — Z71.9 ENCOUNTER FOR EDUCATION: Primary | ICD-10-CM

## 2019-04-15 PROCEDURE — 99207 ZZC NO CHARGE NURSE ONLY: CPT

## 2019-04-15 NOTE — PROGRESS NOTES
Parker and Rose were seen in the [facility] for PICC dressing changes. She stated she has been doing them for Parker already. She had a dressing change kit with her so she demonstrated  a dressing change on the PICC model as she would do at home. She did well using all the supplies in the kit and did not contaminate the sterile gloves. I made several suggestions and she thanked me for the advice. I believe that if they continue to do the procedure as she did here it should be OK.

## 2019-04-16 ENCOUNTER — MYC REFILL (OUTPATIENT)
Dept: PALLIATIVE MEDICINE | Facility: CLINIC | Age: 49
End: 2019-04-16

## 2019-04-16 DIAGNOSIS — G89.29 CHRONIC ABDOMINAL PAIN: ICD-10-CM

## 2019-04-16 DIAGNOSIS — Z79.891 ENCOUNTER FOR LONG-TERM OPIATE ANALGESIC USE: ICD-10-CM

## 2019-04-16 DIAGNOSIS — R10.9 CHRONIC ABDOMINAL PAIN: ICD-10-CM

## 2019-04-16 RX ORDER — OXYCODONE HCL 5 MG/5 ML
10-15 SOLUTION, ORAL ORAL EVERY 4 HOURS PRN
Qty: 1800 ML | Refills: 0 | Status: CANCELLED | OUTPATIENT
Start: 2019-04-16

## 2019-04-17 NOTE — TELEPHONE ENCOUNTER
Patient Comment: To be picked up on the 26th of April by 8:30am any question feel free to call us Thank you     Patient requesting refill(s) of oxyCODONE (ROXICODONE) 5 MG/5ML solution   Last picked up from pharmacy on 03/22/19    Pt last seen by prescribing provider on 3/13/19  Next appt scheduled for 6/12/19     checked in the past 6 months? Yes If no, print current report and give to RN    Last urine drug screen date 10/29/18  Current opioid agreement on file (completed within the last year) Yes Date of opioid agreement: 10/29/18    Processing (pick one and delete the others):      E-pres[place] [facility] [place] - [address] [facility]  pharmacy    Will route to nursing pool for review and preparation of prescription(s).

## 2019-04-17 NOTE — TELEPHONE ENCOUNTER
Medication refill information reviewed.     Due date for  oxyCODONE (ROXICODONE) 5 MG/5ML solution  is 4/28/19     Prescriptions prepped for review.     Will route to provider.

## 2019-04-17 NOTE — TELEPHONE ENCOUNTER
Stoney message from patient on 4/16 at 1103:    Parker Escobar Curtis Sr. would like a refill of the following medications:         oxyCODONE (ROXICODONE) 5 MG/5ML solution [Patti Milligan MD]       Patient Comment: To be picked up on the 26th of April by 8:30am any question feel free to call us Thank you     Preferred pharmacy: [facility] - [address]   ------------  Will route to MA pool for assistance with gathering opioid refill information.    YADIRA CrookN, RN-BC  Patient Care Supervisor/Care Coordinator  [facility]

## 2019-04-18 RX ORDER — OXYCODONE HCL 5 MG/5 ML
10-15 SOLUTION, ORAL ORAL EVERY 4 HOURS PRN
Qty: 1800 ML | Refills: 0 | Status: SHIPPED | OUTPATIENT
Start: 2019-04-18 | End: 2019-05-16

## 2019-04-18 NOTE — TELEPHONE ENCOUNTER
Script Eprescribed to pharmacy    Will send this to MA team to notify patient.    Signed Prescriptions:                        Disp   Refills    oxyCODONE (ROXICODONE) 5 MG/5ML solution   1800 mL0        Sig: Take 10-15 mLs (10-15 mg) by mouth every 4 hours as           needed for pain Max 60 mg/day. Fill 4/25/19 to           start on 4/28/19.  Authorizing Provider: BRANDYN JENKINS MD  [facility]

## 2019-04-22 ENCOUNTER — PATIENT OUTREACH (OUTPATIENT)
Dept: CARE COORDINATION | Facility: CLINIC | Age: 49
End: 2019-04-22

## 2019-04-22 ASSESSMENT — ACTIVITIES OF DAILY LIVING (ADL): DEPENDENT_IADLS:: MEDICATION MANAGEMENT;TRANSPORTATION;SHOPPING

## 2019-04-22 NOTE — PROGRESS NOTES
Clinic Care Coordination Contact    Clinic Care Coordination Contact  OUTREACH    Referral Information:  Referral Source: IP Report    Primary Diagnosis: SIRS/Sepsis    Chief Complaint   Patient presents with     Clinic Care Coordination - Follow-up     RN        Universal Utilization: Patient is followed Infectious Disease, Pain Management and Bariatric Surgery    Clinic Utilization  Difficulty keeping appointments:: No  Compliance Concerns: Yes  No-Show Concerns: No  No PCP office visit in Past Year: No  Utilization    Last refreshed: 4/18/2019  3:31 PM:  Hospital Admissions 3           Last refreshed: 4/18/2019  3:31 PM:  ED Visits 4           Last refreshed: 4/18/2019  3:31 PM:  No Show Count (past year) 9              Current as of: 4/18/2019  3:31 PM              Clinical Concerns:  Current Medical Concerns:  Patient was not able to be seen at infectious disease clinic 4/15/19.  Infectious disease has now been scheduled for 5/20/19.  RN CC spoke with spouse Rose (consent to communicate on file).  Rose has not scheduled the ophthalmology appointment and state she will continue to work on this.  Rose was able to be checked off at the patient education learning center on changing patient's PICC line dressing changes.  Patient's labs have now been decreased to monthly.  Spouse reports low grade fevers <100.5 for the past week accompanied by loose stool.  Today patient had a vomiting episode.  Rose will monitor symptoms in patient and is familiar on when to bring patient into the ED if needed or will call the 24 hour nurse line.    Patient Active Problem List   Diagnosis     Peptic ulcer disease     Gastric bypass status for obesity     Chronic abdominal pain     Vomiting     Anemia     ADHD (attention deficit hyperactivity disorder), inattentive type     Vitamin B12 deficiency without anemia     Thiamine deficiency     Dehydration     Dysphagia     Weight loss, non-intentional     Malnutrition (H)      Chronic anxiety     Constipation     Bile reflux esophagitis     Former smoker     Vitamin D deficiency     Iron deficiency     Health Care Home     Chronic pain     Insomnia     Positive blood culture     Fungemia     Chronic nausea     Gastrostomy tube in place (H)     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)     Short gut syndrome     Anxiety     Status post cervical spinal arthrodesis     Port or reservoir infection, initial encounter     Abnormal echocardiogram     Low serum iron     Anemia, iron deficiency     Catheter-related bloodstream infection (CRBSI)     Generalized weakness     S/P bariatric surgery     Hyperlipidemia LDL goal <130     Bacteremia     Infection by Candida species      Current Behavioral Concerns: no concerns at this time      Education Provided to patient: RN CC reviewed that patient will need to schedule with ophthalmology      Pain  Pain (GOAL):: Yes  Type: Chronic (>3mo)  Location of chronic pain:: chronic abdominal pain and lower back pain  Radiating: Yes  Progression: Waxing and Waning  Chronic pain severity:: 6  Limitation of routine activities due to chronic pain:: Yes  Description: Unable to perform most daily activities (chores, hobbies, social activities, driving)  Alleviating Factors: Pain Medication, Rest, Heat  Health Maintenance Reviewed: Due/Overdue   Health Maintenance Due   Topic Date Due     DTAP/TDAP/TD IMMUNIZATION (9 - Td) 01/23/2019     TOBACCO CESSATION COUNSELING Q1 YR  02/06/2019      Clinical Pathway: None    Medication Management:  Spouse assists with medication management.  Patient is followed by [facility].        Functional Status:  Dependent ADLs:: Ambulation-cane, Bathing  Dependent IADLs:: Medication Management, Transportation, Shopping  Bed or wheelchair confined:: No  Mobility Status: Independent    Living Situation:  Current living arrangement:: I live in a private home with spouse  Type of residence:: Private home -  stairs    Diet/Exercise/Sleep:  Diet:: Regular(But also has TPN)  Inadequate nutrition (GOAL):: No  Food Insecurity: No  Tube Feeding: No  Exercise:: Yes  Days per week of moderate to strenuous exercise (like a brisk walk): 5  On average, minutes per day of exercise at this level: 10  How intense was your typical exercise? : Light (like stretching or slow walking)  Exercise Minutes per Week: 50  Inadequate activity/exercise (GOAL):: No  Significant changes in sleep pattern (GOAL): No    Transportation:  Transportation concerns (GOAL):: No  Transportation means:: Regular car     Psychosocial:  Advent or spiritual beliefs that impact treatment:: No  Mental health DX:: Yes  Mental health DX how managed:: Medication  Mental health management concern (GOAL):: No  Informal Support system:: Family, Spouse     Financial/Insurance:   Financial/Insurance concerns (GOAL):: No       Resources and Interventions:  Current Resources:   List of home care services:: Skilled Nursing;   Community Resources: Home Infusion, Home Care  Supplies used at home:: None  Equipment Currently Used at Home: cane, straight, shower chair    Advance Care Plan/Directive  Advanced Care Plans/Directives on file:: Yes  Type Advanced Care Plans/Directives: Advanced Directive - On File  Advanced Care Plan/Directive Status: Not Applicable    Referrals Placed: None     Goals:   Goals        General    #1 Medical (pt-stated)     Notes - Note edited  4/22/2019  2:38 PM by Behl, Melissa K, RN    Goal Statement: Spouse will re-schedule ophthalmology appointment.  Measure of Success: Patient will schedule and attend ophthalmology appointment.  Supportive Steps to Achieve: RN CC reviewed hospital discharge instructions and provided spouse with ophthalmology [facility]  scheduling number [phone].  Barriers: multiple appointments, distance from [facility] . 2/15/19: had to cancel due to bad weather, need to reschedule  Strengths: supportive spouse, care  coordination  Date to Achieve By: 6/1/19  Patient expressed understanding of goal: yes         #2 Medical (pt-stated)     Notes - Note edited  4/22/2019  2:38 PM by Behl, Melissa K, RN    Goal Statement: Spouse will schedule follow up with infectious disease provider.  Measure of Success: Patient will schedule and attend infectious disease appointment.  Supportive Steps to Achieve: Reviewed hospital discharge instructions, spouse has phone number.  Barriers: multiple appointments, distance from clinic. 2/15/19, had to cancel due to bad weather.  Strengths: supportive spouse, care coordination  Date to Achieve By: 6/1/19  Patient expressed understanding of goal: yes                 Patient/Caregiver understanding: Spouse has fair understanding of current plan of care, but patient and spouse have had barrier with follow through in the past.      Outreach Frequency: monthly  Future Appointments              In 2 weeks Fifi Price, PT [facility], [facility]    In 4 weeks Mikaela Ma MD [facility] and Infectious Diseases, [facility]    In 1 month Patti Milligan MD [facility] [place], [facility]          Plan:   1. Patient will see infectious disease as scheduled 5/20/19.  2. Patient will schedule a follow with ophthalmology.  3. Patient/spouse will seek care for temperature >100.5 or worsening, new symptoms.  4. RN CC will follow up with patient and spouse in 2-3 weeks.    Melissa Behl BSN, RN, PHN  Primary Care Clinical RN Care Coordinator  [facility]   [phone]

## 2019-04-23 DIAGNOSIS — E46 MALNUTRITION, UNSPECIFIED TYPE (H): ICD-10-CM

## 2019-04-23 DIAGNOSIS — E61.1 IRON DEFICIENCY: Primary | ICD-10-CM

## 2019-04-23 DIAGNOSIS — D50.8 IRON DEFICIENCY ANEMIA SECONDARY TO INADEQUATE DIETARY IRON INTAKE: ICD-10-CM

## 2019-04-23 DIAGNOSIS — E86.0 DEHYDRATION: ICD-10-CM

## 2019-04-23 DIAGNOSIS — D50.8 OTHER IRON DEFICIENCY ANEMIA: ICD-10-CM

## 2019-04-23 DIAGNOSIS — E46 MALNUTRITION (H): ICD-10-CM

## 2019-04-23 LAB — MANGANESE RBC-MCNC: NORMAL UG/L

## 2019-05-01 ENCOUNTER — MYC REFILL (OUTPATIENT)
Dept: INTERNAL MEDICINE | Facility: CLINIC | Age: 49
End: 2019-05-01

## 2019-05-01 DIAGNOSIS — F41.9 ANXIETY: ICD-10-CM

## 2019-05-01 DIAGNOSIS — F90.0 ADHD (ATTENTION DEFICIT HYPERACTIVITY DISORDER), INATTENTIVE TYPE: ICD-10-CM

## 2019-05-03 RX ORDER — LORAZEPAM 1 MG/1
TABLET ORAL
Qty: 50 TABLET | Refills: 0 | Status: SHIPPED | OUTPATIENT
Start: 2019-05-03 | End: 2019-06-10

## 2019-05-03 RX ORDER — DEXTROAMPHETAMINE SACCHARATE, AMPHETAMINE ASPARTATE, DEXTROAMPHETAMINE SULFATE AND AMPHETAMINE SULFATE 5; 5; 5; 5 MG/1; MG/1; MG/1; MG/1
20 TABLET ORAL DAILY
Qty: 30 TABLET | Refills: 0 | Status: SHIPPED | OUTPATIENT
Start: 2019-05-03 | End: 2019-06-05

## 2019-05-03 NOTE — TELEPHONE ENCOUNTER
Requested Prescriptions   Pending Prescriptions Disp Refills     amphetamine-dextroamphetamine (ADDERALL) 20 MG tablet 30 tablet 0     Sig: Take 1 tablet (20 mg) by mouth daily   Last Written Prescription Date:  4/10/19  Last Fill Quantity: 30,  # refills: 0   Last office visit: 4/10/2019 with prescribing provider:     Future Office Visit:   Next 5 appointments (look out 90 days)    2019  1:45 PM CDT  Return Visit with Patti Milligan MD  [facility] ([facility]) [address] [facility] [address]  [phone]         Routing refill request to provider for review/approval because:  Drug not on the [facility] refill protocol           There is no refill protocol information for this order        LORazepam (ATIVAN) 1 MG tablet 50 tablet 0     Si-2 mg as needed for anxiety with TPN and medications       There is no refill protocol information for this order      Last Written Prescription Date:  4/10/19  Last Fill Quantity: 50,  # refills: 0   Last office visit: 4/10/2019 with prescribing provider:     Future Office Visit:   Next 5 appointments (look out 90 days)    2019  1:45 PM CDT  Return Visit with Patti Milligan MD  [facility] ([facility]) [address] [facility] [id number] [phone]       Routing refill request to provider for review/approval because:  Drug not on the [facility] refill protocol     Ella Hammond RN

## 2019-05-06 DIAGNOSIS — F90.0 ADHD (ATTENTION DEFICIT HYPERACTIVITY DISORDER), INATTENTIVE TYPE: ICD-10-CM

## 2019-05-06 RX ORDER — DEXTROAMPHETAMINE SACCHARATE, AMPHETAMINE ASPARTATE, DEXTROAMPHETAMINE SULFATE AND AMPHETAMINE SULFATE 5; 5; 5; 5 MG/1; MG/1; MG/1; MG/1
20 TABLET ORAL DAILY
Qty: 30 TABLET | Refills: 0 | Status: CANCELLED | OUTPATIENT
Start: 2019-05-06

## 2019-05-07 ENCOUNTER — TELEPHONE (OUTPATIENT)
Dept: INTERNAL MEDICINE | Facility: CLINIC | Age: 49
End: 2019-05-07

## 2019-05-08 ENCOUNTER — PATIENT OUTREACH (OUTPATIENT)
Dept: CARE COORDINATION | Facility: CLINIC | Age: 49
End: 2019-05-08

## 2019-05-08 ASSESSMENT — ACTIVITIES OF DAILY LIVING (ADL): DEPENDENT_IADLS:: MEDICATION MANAGEMENT;TRANSPORTATION;SHOPPING

## 2019-05-08 NOTE — PROGRESS NOTES
Clinic Care Coordination Contact  [facility]/Voicemail    Referral Source: IP Report  Clinical Data: Care Coordinator Outreach  Outreach attempted x 1.  No answer on home phone number and cell phone number was busy.  Plan: Care Coordinator mailed out care coordination introduction letter on 4/14/16. Care Coordinator will try to reach patient again in 3-5 business days.    Melissa Behl BSN, RN, PHN  Primary Care Clinical RN Care Coordinator  [facility]   [phone]

## 2019-05-10 ENCOUNTER — HOME INFUSION (PRE-WILLOW HOME INFUSION) (OUTPATIENT)
Dept: PHARMACY | Facility: CLINIC | Age: 49
End: 2019-05-10

## 2019-05-10 ENCOUNTER — INFUSION THERAPY VISIT (OUTPATIENT)
Dept: INFUSION THERAPY | Facility: CLINIC | Age: 49
End: 2019-05-10
Attending: INTERNAL MEDICINE
Payer: COMMERCIAL

## 2019-05-10 DIAGNOSIS — D64.9 ANEMIA: ICD-10-CM

## 2019-05-10 DIAGNOSIS — Z98.84 BARIATRIC SURGERY STATUS: Primary | ICD-10-CM

## 2019-05-10 DIAGNOSIS — E46 MALNUTRITION, UNSPECIFIED TYPE (H): ICD-10-CM

## 2019-05-10 DIAGNOSIS — E43 SEVERE PROTEIN-CALORIE MALNUTRITION (H): ICD-10-CM

## 2019-05-10 DIAGNOSIS — T80.211D CATHETER-RELATED BLOODSTREAM INFECTION, SUBSEQUENT ENCOUNTER: ICD-10-CM

## 2019-05-10 DIAGNOSIS — E61.1 IRON DEFICIENCY: ICD-10-CM

## 2019-05-10 DIAGNOSIS — Z95.828 S/P PICC CENTRAL LINE PLACEMENT: ICD-10-CM

## 2019-05-10 DIAGNOSIS — D50.8 OTHER IRON DEFICIENCY ANEMIA: ICD-10-CM

## 2019-05-10 DIAGNOSIS — E86.0 DEHYDRATION: ICD-10-CM

## 2019-05-10 LAB
ALBUMIN SERPL-MCNC: 3.3 G/DL (ref 3.4–5)
ALP SERPL-CCNC: 85 U/L (ref 40–150)
ALT SERPL W P-5'-P-CCNC: 28 U/L (ref 0–70)
AST SERPL W P-5'-P-CCNC: 17 U/L (ref 0–45)
BASOPHILS # BLD AUTO: 0.1 10E9/L (ref 0–0.2)
BASOPHILS NFR BLD AUTO: 0.7 %
BILIRUB DIRECT SERPL-MCNC: <0.1 MG/DL (ref 0–0.2)
BILIRUB SERPL-MCNC: 0.3 MG/DL (ref 0.2–1.3)
BUN SERPL-MCNC: 20 MG/DL (ref 7–30)
CALCIUM SERPL-MCNC: 8.1 MG/DL (ref 8.5–10.1)
CHLORIDE SERPL-SCNC: 110 MMOL/L (ref 94–109)
CO2 SERPL-SCNC: 29 MMOL/L (ref 20–32)
CREAT SERPL-MCNC: 0.58 MG/DL (ref 0.66–1.25)
DIFFERENTIAL METHOD BLD: ABNORMAL
EOSINOPHIL NFR BLD AUTO: 3.7 %
ERYTHROCYTE [DISTWIDTH] IN BLOOD BY AUTOMATED COUNT: 12.8 % (ref 10–15)
GFR SERPL CREATININE-BSD FRML MDRD: >90 ML/MIN/{1.73_M2}
GLUCOSE SERPL-MCNC: 102 MG/DL (ref 70–99)
HCT VFR BLD AUTO: 35 % (ref 40–53)
HGB BLD-MCNC: 11.6 G/DL (ref 13.3–17.7)
IMM GRANULOCYTES # BLD: 0 10E9/L (ref 0–0.4)
IMM GRANULOCYTES NFR BLD: 0.1 %
LYMPHOCYTES # BLD AUTO: 1.9 10E9/L (ref 0.8–5.3)
LYMPHOCYTES NFR BLD AUTO: 28.6 %
MAGNESIUM SERPL-MCNC: 2.3 MG/DL (ref 1.6–2.3)
MCH RBC QN AUTO: 32.1 PG (ref 26.5–33)
MCHC RBC AUTO-ENTMCNC: 33.1 G/DL (ref 31.5–36.5)
MCV RBC AUTO: 97 FL (ref 78–100)
MONOCYTES # BLD AUTO: 0.9 10E9/L (ref 0–1.3)
MONOCYTES NFR BLD AUTO: 13.6 %
NEUTROPHILS # BLD AUTO: 3.6 10E9/L (ref 1.6–8.3)
NEUTROPHILS NFR BLD AUTO: 53.3 %
NRBC # BLD AUTO: 0 10*3/UL
NRBC BLD AUTO-RTO: 0 /100
PHOSPHATE SERPL-MCNC: 4.4 MG/DL (ref 2.5–4.5)
PLATELET # BLD AUTO: 153 10E9/L (ref 150–450)
POTASSIUM SERPL-SCNC: 3.5 MMOL/L (ref 3.4–5.3)
PROT SERPL-MCNC: 6.5 G/DL (ref 6.8–8.8)
RBC # BLD AUTO: 3.61 10E12/L (ref 4.4–5.9)
SODIUM SERPL-SCNC: 145 MMOL/L (ref 133–144)
TRIGL SERPL-MCNC: 71 MG/DL
WBC # BLD AUTO: 6.8 10E9/L (ref 4–11)

## 2019-05-10 PROCEDURE — 87077 CULTURE AEROBIC IDENTIFY: CPT | Performed by: NURSE PRACTITIONER

## 2019-05-10 PROCEDURE — 84100 ASSAY OF PHOSPHORUS: CPT | Performed by: SURGERY

## 2019-05-10 PROCEDURE — 83735 ASSAY OF MAGNESIUM: CPT | Performed by: SURGERY

## 2019-05-10 PROCEDURE — 36592 COLLECT BLOOD FROM PICC: CPT

## 2019-05-10 PROCEDURE — 80053 COMPREHEN METABOLIC PANEL: CPT | Performed by: SURGERY

## 2019-05-10 PROCEDURE — 83785 ASSAY OF MANGANESE: CPT | Performed by: SURGERY

## 2019-05-10 PROCEDURE — 87800 DETECT AGNT MULT DNA DIREC: CPT | Performed by: NURSE PRACTITIONER

## 2019-05-10 PROCEDURE — 85025 COMPLETE CBC W/AUTO DIFF WBC: CPT | Performed by: SURGERY

## 2019-05-10 PROCEDURE — 84478 ASSAY OF TRIGLYCERIDES: CPT | Performed by: SURGERY

## 2019-05-10 PROCEDURE — 87040 BLOOD CULTURE FOR BACTERIA: CPT | Performed by: NURSE PRACTITIONER

## 2019-05-10 PROCEDURE — 87186 SC STD MICRODIL/AGAR DIL: CPT | Performed by: NURSE PRACTITIONER

## 2019-05-10 PROCEDURE — 82248 BILIRUBIN DIRECT: CPT | Performed by: SURGERY

## 2019-05-10 NOTE — PROGRESS NOTES
Infusion Nursing Note:  Parker Acevedo Sr. presents today for PICC labs and dressing change.    Patient seen by provider today: No   present during visit today: Not Applicable.    Note: Patient arrived with wife. Changed dressing and slightly pink at insertion site and wife says it looks much better than it did Monday. She requests blood culture to be drawn with labs today due to low grade fevers this week.    Intravenous Access:  Labs drawn without difficulty.  PICC.    Treatment Conditions:  Not Applicable.      Post Infusion Assessment:  Blood return noted.       Discharge Plan:   Discharge instructions reviewed with: Patient and Family.  Patient and/or family verbalized understanding of discharge instructions and all questions answered.  Patient discharged in stable condition accompanied by: self and wife.  Departure Mode: Ambulatory.    Kacie Guerrier RN

## 2019-05-10 NOTE — PATIENT INSTRUCTIONS
Pt to return as needed for infusion needs, will call to schedule as needed. Copies of medication list and upcoming appointments given prior to discharge.

## 2019-05-12 ENCOUNTER — HOSPITAL ENCOUNTER (INPATIENT)
Facility: CLINIC | Age: 49
LOS: 4 days | Discharge: HOME HEALTH | DRG: 315 | End: 2019-05-16
Attending: EMERGENCY MEDICINE | Admitting: ORTHOPAEDIC SURGERY
Payer: COMMERCIAL

## 2019-05-12 ENCOUNTER — TELEPHONE (OUTPATIENT)
Dept: SURGERY | Facility: CLINIC | Age: 49
End: 2019-05-12

## 2019-05-12 ENCOUNTER — APPOINTMENT (OUTPATIENT)
Dept: GENERAL RADIOLOGY | Facility: CLINIC | Age: 49
DRG: 315 | End: 2019-05-12
Attending: EMERGENCY MEDICINE
Payer: COMMERCIAL

## 2019-05-12 DIAGNOSIS — E46 MALNUTRITION, UNSPECIFIED TYPE (H): Primary | ICD-10-CM

## 2019-05-12 DIAGNOSIS — K27.9 PEPTIC ULCER DISEASE: ICD-10-CM

## 2019-05-12 DIAGNOSIS — K90.829 SHORT GUT SYNDROME: ICD-10-CM

## 2019-05-12 DIAGNOSIS — R78.81 POSITIVE BLOOD CULTURE: ICD-10-CM

## 2019-05-12 DIAGNOSIS — R78.81 BACTEREMIA: ICD-10-CM

## 2019-05-12 LAB
ALBUMIN SERPL-MCNC: 3.8 G/DL (ref 3.4–5)
ALBUMIN UR-MCNC: NEGATIVE MG/DL
ALP SERPL-CCNC: 93 U/L (ref 40–150)
ALT SERPL W P-5'-P-CCNC: 28 U/L (ref 0–70)
ANION GAP SERPL CALCULATED.3IONS-SCNC: 5 MMOL/L (ref 3–14)
APPEARANCE UR: CLEAR
AST SERPL W P-5'-P-CCNC: 16 U/L (ref 0–45)
BASOPHILS # BLD AUTO: 0 10E9/L (ref 0–0.2)
BASOPHILS NFR BLD AUTO: 0.5 %
BILIRUB SERPL-MCNC: 0.5 MG/DL (ref 0.2–1.3)
BILIRUB UR QL STRIP: NEGATIVE
BUN SERPL-MCNC: 13 MG/DL (ref 7–30)
CALCIUM SERPL-MCNC: 8.6 MG/DL (ref 8.5–10.1)
CHLORIDE SERPL-SCNC: 108 MMOL/L (ref 94–109)
CO2 SERPL-SCNC: 29 MMOL/L (ref 20–32)
COLOR UR AUTO: YELLOW
CREAT SERPL-MCNC: 0.68 MG/DL (ref 0.66–1.25)
DIFFERENTIAL METHOD BLD: ABNORMAL
EOSINOPHIL # BLD AUTO: 0.2 10E9/L (ref 0–0.7)
EOSINOPHIL NFR BLD AUTO: 2.7 %
ERYTHROCYTE [DISTWIDTH] IN BLOOD BY AUTOMATED COUNT: 12.6 % (ref 10–15)
GFR SERPL CREATININE-BSD FRML MDRD: >90 ML/MIN/{1.73_M2}
GLUCOSE SERPL-MCNC: 88 MG/DL (ref 70–99)
GLUCOSE UR STRIP-MCNC: NEGATIVE MG/DL
HCT VFR BLD AUTO: 38.8 % (ref 40–53)
HGB BLD-MCNC: 12.4 G/DL (ref 13.3–17.7)
HGB UR QL STRIP: ABNORMAL
IMM GRANULOCYTES # BLD: 0 10E9/L (ref 0–0.4)
IMM GRANULOCYTES NFR BLD: 0.3 %
KETONES UR STRIP-MCNC: NEGATIVE MG/DL
LACTATE BLD-SCNC: 0.7 MMOL/L (ref 0.7–2)
LEUKOCYTE ESTERASE UR QL STRIP: NEGATIVE
LYMPHOCYTES # BLD AUTO: 2.2 10E9/L (ref 0.8–5.3)
LYMPHOCYTES NFR BLD AUTO: 28.9 %
MCH RBC QN AUTO: 30.8 PG (ref 26.5–33)
MCHC RBC AUTO-ENTMCNC: 32 G/DL (ref 31.5–36.5)
MCV RBC AUTO: 97 FL (ref 78–100)
MONOCYTES # BLD AUTO: 1 10E9/L (ref 0–1.3)
MONOCYTES NFR BLD AUTO: 12.4 %
NEUTROPHILS # BLD AUTO: 4.3 10E9/L (ref 1.6–8.3)
NEUTROPHILS NFR BLD AUTO: 55.2 %
NITRATE UR QL: NEGATIVE
NRBC # BLD AUTO: 0 10*3/UL
NRBC BLD AUTO-RTO: 0 /100
PH UR STRIP: 6.5 PH (ref 5–7)
PLATELET # BLD AUTO: 181 10E9/L (ref 150–450)
POTASSIUM SERPL-SCNC: 3.3 MMOL/L (ref 3.4–5.3)
PROT SERPL-MCNC: 7.4 G/DL (ref 6.8–8.8)
RBC # BLD AUTO: 4.02 10E12/L (ref 4.4–5.9)
RBC #/AREA URNS AUTO: 3 /HPF (ref 0–2)
SODIUM SERPL-SCNC: 142 MMOL/L (ref 133–144)
SOURCE: ABNORMAL
SP GR UR STRIP: 1.02 (ref 1–1.03)
SQUAMOUS #/AREA URNS AUTO: <1 /HPF (ref 0–1)
UROBILINOGEN UR STRIP-MCNC: NORMAL MG/DL (ref 0–2)
WBC # BLD AUTO: 7.8 10E9/L (ref 4–11)
WBC #/AREA URNS AUTO: 1 /HPF (ref 0–5)

## 2019-05-12 PROCEDURE — 85025 COMPLETE CBC W/AUTO DIFF WBC: CPT | Performed by: EMERGENCY MEDICINE

## 2019-05-12 PROCEDURE — 25000128 H RX IP 250 OP 636: Performed by: EMERGENCY MEDICINE

## 2019-05-12 PROCEDURE — 99285 EMERGENCY DEPT VISIT HI MDM: CPT | Mod: 25 | Performed by: EMERGENCY MEDICINE

## 2019-05-12 PROCEDURE — 80053 COMPREHEN METABOLIC PANEL: CPT | Performed by: EMERGENCY MEDICINE

## 2019-05-12 PROCEDURE — 84100 ASSAY OF PHOSPHORUS: CPT | Performed by: EMERGENCY MEDICINE

## 2019-05-12 PROCEDURE — 84443 ASSAY THYROID STIM HORMONE: CPT | Performed by: EMERGENCY MEDICINE

## 2019-05-12 PROCEDURE — 71046 X-RAY EXAM CHEST 2 VIEWS: CPT

## 2019-05-12 PROCEDURE — 83605 ASSAY OF LACTIC ACID: CPT | Performed by: EMERGENCY MEDICINE

## 2019-05-12 PROCEDURE — 81001 URINALYSIS AUTO W/SCOPE: CPT | Performed by: EMERGENCY MEDICINE

## 2019-05-12 PROCEDURE — 87040 BLOOD CULTURE FOR BACTERIA: CPT | Performed by: EMERGENCY MEDICINE

## 2019-05-12 PROCEDURE — 96374 THER/PROPH/DIAG INJ IV PUSH: CPT | Performed by: EMERGENCY MEDICINE

## 2019-05-12 PROCEDURE — 99223 1ST HOSP IP/OBS HIGH 75: CPT | Mod: GC | Performed by: ORTHOPAEDIC SURGERY

## 2019-05-12 PROCEDURE — 99284 EMERGENCY DEPT VISIT MOD MDM: CPT | Mod: Z6 | Performed by: EMERGENCY MEDICINE

## 2019-05-12 PROCEDURE — 12000026 ZZH R&B TRANSPLANT

## 2019-05-12 PROCEDURE — 3E0436Z INTRODUCTION OF NUTRITIONAL SUBSTANCE INTO CENTRAL VEIN, PERCUTANEOUS APPROACH: ICD-10-PCS | Performed by: ORTHOPAEDIC SURGERY

## 2019-05-12 PROCEDURE — 83735 ASSAY OF MAGNESIUM: CPT | Performed by: EMERGENCY MEDICINE

## 2019-05-12 RX ORDER — POTASSIUM CHLORIDE 29.8 MG/ML
20 INJECTION INTRAVENOUS
Status: DISCONTINUED | OUTPATIENT
Start: 2019-05-12 | End: 2019-05-16 | Stop reason: HOSPADM

## 2019-05-12 RX ORDER — CYANOCOBALAMIN 1000 UG/ML
1000 INJECTION, SOLUTION INTRAMUSCULAR; SUBCUTANEOUS
Status: DISCONTINUED | OUTPATIENT
Start: 2019-05-13 | End: 2019-05-13

## 2019-05-12 RX ORDER — MAGNESIUM SULFATE HEPTAHYDRATE 40 MG/ML
4 INJECTION, SOLUTION INTRAVENOUS EVERY 4 HOURS PRN
Status: DISCONTINUED | OUTPATIENT
Start: 2019-05-12 | End: 2019-05-16 | Stop reason: HOSPADM

## 2019-05-12 RX ORDER — ERGOCALCIFEROL 1.25 MG/1
50000 CAPSULE, LIQUID FILLED ORAL WEEKLY
Status: DISCONTINUED | OUTPATIENT
Start: 2019-05-13 | End: 2019-05-13

## 2019-05-12 RX ORDER — DEXTROAMPHETAMINE SACCHARATE, AMPHETAMINE ASPARTATE, DEXTROAMPHETAMINE SULFATE AND AMPHETAMINE SULFATE 2.5; 2.5; 2.5; 2.5 MG/1; MG/1; MG/1; MG/1
20 TABLET ORAL DAILY
Status: DISCONTINUED | OUTPATIENT
Start: 2019-05-13 | End: 2019-05-16 | Stop reason: HOSPADM

## 2019-05-12 RX ORDER — ONDANSETRON 2 MG/ML
4 INJECTION INTRAMUSCULAR; INTRAVENOUS ONCE
Status: COMPLETED | OUTPATIENT
Start: 2019-05-12 | End: 2019-05-12

## 2019-05-12 RX ORDER — POTASSIUM CL/LIDO/0.9 % NACL 10MEQ/0.1L
10 INTRAVENOUS SOLUTION, PIGGYBACK (ML) INTRAVENOUS
Status: DISCONTINUED | OUTPATIENT
Start: 2019-05-12 | End: 2019-05-16 | Stop reason: HOSPADM

## 2019-05-12 RX ORDER — HYDROCODONE BITARTRATE AND ACETAMINOPHEN 7.5; 325 MG/15ML; MG/15ML
10 SOLUTION ORAL EVERY 4 HOURS PRN
Status: DISCONTINUED | OUTPATIENT
Start: 2019-05-12 | End: 2019-05-13

## 2019-05-12 RX ORDER — ACETAMINOPHEN 325 MG/1
650 TABLET ORAL EVERY 4 HOURS PRN
Status: DISCONTINUED | OUTPATIENT
Start: 2019-05-12 | End: 2019-05-13

## 2019-05-12 RX ORDER — ONDANSETRON 2 MG/ML
4 INJECTION INTRAMUSCULAR; INTRAVENOUS EVERY 6 HOURS PRN
Status: DISCONTINUED | OUTPATIENT
Start: 2019-05-12 | End: 2019-05-16 | Stop reason: HOSPADM

## 2019-05-12 RX ORDER — OXYCODONE HCL 5 MG/5 ML
10-15 SOLUTION, ORAL ORAL EVERY 4 HOURS PRN
Status: DISCONTINUED | OUTPATIENT
Start: 2019-05-12 | End: 2019-05-16 | Stop reason: HOSPADM

## 2019-05-12 RX ORDER — POTASSIUM CHLORIDE 7.45 MG/ML
10 INJECTION INTRAVENOUS
Status: DISCONTINUED | OUTPATIENT
Start: 2019-05-12 | End: 2019-05-16 | Stop reason: HOSPADM

## 2019-05-12 RX ORDER — POTASSIUM CHLORIDE 750 MG/1
20-40 TABLET, EXTENDED RELEASE ORAL
Status: DISCONTINUED | OUTPATIENT
Start: 2019-05-12 | End: 2019-05-16 | Stop reason: HOSPADM

## 2019-05-12 RX ORDER — CALCIUM CARBONATE 500 MG/1
1000 TABLET, CHEWABLE ORAL 4 TIMES DAILY PRN
Status: DISCONTINUED | OUTPATIENT
Start: 2019-05-12 | End: 2019-05-16 | Stop reason: HOSPADM

## 2019-05-12 RX ORDER — POTASSIUM CHLORIDE 1.5 G/1.58G
20-40 POWDER, FOR SOLUTION ORAL
Status: DISCONTINUED | OUTPATIENT
Start: 2019-05-12 | End: 2019-05-16 | Stop reason: HOSPADM

## 2019-05-12 RX ORDER — CARVEDILOL 6.25 MG/1
6.25 TABLET ORAL 2 TIMES DAILY WITH MEALS
Status: DISCONTINUED | OUTPATIENT
Start: 2019-05-13 | End: 2019-05-16 | Stop reason: HOSPADM

## 2019-05-12 RX ORDER — DIPHENHYDRAMINE HCL 25 MG
25 CAPSULE ORAL EVERY 6 HOURS PRN
Status: DISCONTINUED | OUTPATIENT
Start: 2019-05-12 | End: 2019-05-13

## 2019-05-12 RX ORDER — LIDOCAINE 4 G/G
1-2 PATCH TOPICAL AT BEDTIME
Status: DISCONTINUED | OUTPATIENT
Start: 2019-05-13 | End: 2019-05-16 | Stop reason: HOSPADM

## 2019-05-12 RX ORDER — POLYETHYLENE GLYCOL 3350 17 G/17G
17 POWDER, FOR SOLUTION ORAL DAILY
Status: DISCONTINUED | OUTPATIENT
Start: 2019-05-13 | End: 2019-05-16 | Stop reason: HOSPADM

## 2019-05-12 RX ORDER — DIPHENHYDRAMINE HYDROCHLORIDE 50 MG/ML
25 INJECTION INTRAMUSCULAR; INTRAVENOUS ONCE
Status: COMPLETED | OUTPATIENT
Start: 2019-05-12 | End: 2019-05-13

## 2019-05-12 RX ORDER — NALOXONE HYDROCHLORIDE 0.4 MG/ML
.1-.4 INJECTION, SOLUTION INTRAMUSCULAR; INTRAVENOUS; SUBCUTANEOUS
Status: DISCONTINUED | OUTPATIENT
Start: 2019-05-12 | End: 2019-05-16 | Stop reason: HOSPADM

## 2019-05-12 RX ORDER — ALBUTEROL SULFATE 90 UG/1
2 AEROSOL, METERED RESPIRATORY (INHALATION) EVERY 4 HOURS PRN
Status: DISCONTINUED | OUTPATIENT
Start: 2019-05-12 | End: 2019-05-16 | Stop reason: HOSPADM

## 2019-05-12 RX ORDER — ONDANSETRON 4 MG/1
4 TABLET, ORALLY DISINTEGRATING ORAL EVERY 6 HOURS PRN
Status: DISCONTINUED | OUTPATIENT
Start: 2019-05-12 | End: 2019-05-16 | Stop reason: HOSPADM

## 2019-05-12 RX ORDER — HYDROCODONE BITARTRATE AND ACETAMINOPHEN 5; 325 MG/1; MG/1
1 TABLET ORAL ONCE
Status: DISCONTINUED | OUTPATIENT
Start: 2019-05-12 | End: 2019-05-16 | Stop reason: HOSPADM

## 2019-05-12 RX ORDER — DIPHENHYDRAMINE HCL 25 MG
25 CAPSULE ORAL ONCE
Status: DISCONTINUED | OUTPATIENT
Start: 2019-05-12 | End: 2019-05-13

## 2019-05-12 RX ORDER — ONDANSETRON 4 MG/1
4 TABLET, ORALLY DISINTEGRATING ORAL ONCE
Status: COMPLETED | OUTPATIENT
Start: 2019-05-12 | End: 2019-05-13

## 2019-05-12 RX ADMIN — ONDANSETRON 4 MG: 2 INJECTION INTRAMUSCULAR; INTRAVENOUS at 22:20

## 2019-05-12 ASSESSMENT — ENCOUNTER SYMPTOMS
NAUSEA: 1
TREMORS: 1
SORE THROAT: 1
FEVER: 1
VOMITING: 1
HEADACHES: 1

## 2019-05-12 NOTE — LETTER
Transition Communication Hand-off for Care Transitions to Next Level of Care Provider    Name: Parker Acevedo .  : 1970  MRN #: [id number]  Primary Care Provider: Chuy Isaac     Primary Clinic: [address] [place] [id number]     Reason for Hospitalization:  Bacteremia [R78.81]  Admit Date/Time: 2019  8:27 PM  Discharge Date: 2019  Payor Source: Payor: BCBS / Plan: BCBS MEDICARE ADVANTAGE / Product Type: Medicare /     Readmission Assessment Measure (WILBERT) Risk Score/category: 11/Medium         Reason for Communication Hand-off Referral: Multiple providers/specialties    Discharge Plan:  Discharge to home on this date w/[facility] services.   Follow up with Dr. Jeter , in Infectious Disease on 19  for hospital follow- up.      Concern for non-adherence with plan of care:   No    Follow-up specialty is recommended: Yes    Follow-up plan:    Future Appointments   Date Time Provider Department Center   2019 10:30 AM Mikaela Ma MD [facility]   2019  1:30 PM Melida Nash, PT PH[facility]   2019  1:45 PM Patti Milligan MD [facility]       Any outstanding tests or procedures:        Referrals     Future Labs/Procedures    Home infusion referral     Comments:    Your provider has referred you to:  [facility] [phone]     [email or web address]    Local Address (if different from home address): N/A    Anticipated Length of Therapy: As ordered by provider.    Home Infusion Pharmacist to adjust therapy based on labs and clinical assessments: Yes    Labs:  May draw labs from Venous Catheter: Yes  Home Infusion Pharmacist to order labs based on therapy type and clinical assessments: Yes, per TNP protocol.  Call/Fax Lab Results to: Per TPN protocol.    Agency Staff to assess nursing needs for Infusion Therapy.    Access Device Management:  IV Access Type:  PICC  Flush with Heparin and Normal Saline IVP PRN and routine site care (per agency protocol) to maintain access device? Yes    Ok for wife to perform PICC dressing cares per protocol.    Infectious Disease Referral     Scheduling Instructions:    Per inpatient ID team - please schedule with Dr. Jeter on 5/29/19            Miri Cooper, RNCC, BSN    [facility]  [address] [place] [id number]    [email or web address]    Office: [phone] Pager: [phone]  To contact weekend RNCC, dial * [phone] and enter pager number [phone] at prompt. This pager can not be contacted by text page or outside line.          AVS/Discharge Summary is the source of truth; this is a helpful guide for improved communication of patient story

## 2019-05-12 NOTE — TELEPHONE ENCOUNTER
Pt is 48M with chronic malnutrition due to gastric bypass and smoking; he has a gastrostomy tube in for which he is receiving supplemental nutrition, and a PICC line in as well for TPN. I was just contacted by the lab regarding a positive blood culture. I was able to reach the patient's wife, who did say the patient was running a temperature and had a rash. I advised they seek medical attention immediately. Medical admission should be fine. I anticipate he will need  -PICC line removal and replacement  -Line holiday x2 days  -Tube feeds to continue  -Appropriate antibiotic therapy for resolution of blood cultures (recommend ID eval for length and need for PO vs IV)    Clif Morrow MD  Instructor of Surgery  Fellow, MIS

## 2019-05-13 ENCOUNTER — HOME INFUSION (PRE-WILLOW HOME INFUSION) (OUTPATIENT)
Dept: PHARMACY | Facility: CLINIC | Age: 49
End: 2019-05-13

## 2019-05-13 LAB
ANION GAP SERPL CALCULATED.3IONS-SCNC: 4 MMOL/L (ref 3–14)
BUN SERPL-MCNC: 9 MG/DL (ref 7–30)
CALCIUM SERPL-MCNC: 7.8 MG/DL (ref 8.5–10.1)
CHLORIDE SERPL-SCNC: 111 MMOL/L (ref 94–109)
CO2 SERPL-SCNC: 27 MMOL/L (ref 20–32)
CREAT SERPL-MCNC: 0.63 MG/DL (ref 0.66–1.25)
ERYTHROCYTE [DISTWIDTH] IN BLOOD BY AUTOMATED COUNT: 12.7 % (ref 10–15)
GFR SERPL CREATININE-BSD FRML MDRD: >90 ML/MIN/{1.73_M2}
GLUCOSE BLDC GLUCOMTR-MCNC: 102 MG/DL (ref 70–99)
GLUCOSE BLDC GLUCOMTR-MCNC: 87 MG/DL (ref 70–99)
GLUCOSE SERPL-MCNC: 89 MG/DL (ref 70–99)
HCT VFR BLD AUTO: 34.3 % (ref 40–53)
HGB BLD-MCNC: 11.1 G/DL (ref 13.3–17.7)
MAGNESIUM SERPL-MCNC: 2.1 MG/DL (ref 1.6–2.3)
MCH RBC QN AUTO: 31.4 PG (ref 26.5–33)
MCHC RBC AUTO-ENTMCNC: 32.4 G/DL (ref 31.5–36.5)
MCV RBC AUTO: 97 FL (ref 78–100)
PHOSPHATE SERPL-MCNC: 3.7 MG/DL (ref 2.5–4.5)
PLATELET # BLD AUTO: 144 10E9/L (ref 150–450)
POTASSIUM SERPL-SCNC: 3.3 MMOL/L (ref 3.4–5.3)
POTASSIUM SERPL-SCNC: 3.8 MMOL/L (ref 3.4–5.3)
RBC # BLD AUTO: 3.54 10E12/L (ref 4.4–5.9)
SODIUM SERPL-SCNC: 142 MMOL/L (ref 133–144)
TSH SERPL DL<=0.005 MIU/L-ACNC: 1.16 MU/L (ref 0.4–4)
WBC # BLD AUTO: 5.9 10E9/L (ref 4–11)

## 2019-05-13 PROCEDURE — 25000128 H RX IP 250 OP 636: Performed by: STUDENT IN AN ORGANIZED HEALTH CARE EDUCATION/TRAINING PROGRAM

## 2019-05-13 PROCEDURE — 25000131 ZZH RX MED GY IP 250 OP 636 PS 637: Performed by: EMERGENCY MEDICINE

## 2019-05-13 PROCEDURE — 84100 ASSAY OF PHOSPHORUS: CPT | Performed by: STUDENT IN AN ORGANIZED HEALTH CARE EDUCATION/TRAINING PROGRAM

## 2019-05-13 PROCEDURE — 25000132 ZZH RX MED GY IP 250 OP 250 PS 637: Performed by: STUDENT IN AN ORGANIZED HEALTH CARE EDUCATION/TRAINING PROGRAM

## 2019-05-13 PROCEDURE — 25000125 ZZHC RX 250: Performed by: ORTHOPAEDIC SURGERY

## 2019-05-13 PROCEDURE — 80048 BASIC METABOLIC PNL TOTAL CA: CPT | Performed by: STUDENT IN AN ORGANIZED HEALTH CARE EDUCATION/TRAINING PROGRAM

## 2019-05-13 PROCEDURE — 00000146 ZZHCL STATISTIC GLUCOSE BY METER IP

## 2019-05-13 PROCEDURE — 36415 COLL VENOUS BLD VENIPUNCTURE: CPT | Performed by: ORTHOPAEDIC SURGERY

## 2019-05-13 PROCEDURE — 84132 ASSAY OF SERUM POTASSIUM: CPT | Performed by: ORTHOPAEDIC SURGERY

## 2019-05-13 PROCEDURE — 12000012 ZZH R&B MS OVERFLOW UMMC

## 2019-05-13 PROCEDURE — 25000128 H RX IP 250 OP 636: Performed by: EMERGENCY MEDICINE

## 2019-05-13 PROCEDURE — 36415 COLL VENOUS BLD VENIPUNCTURE: CPT | Performed by: STUDENT IN AN ORGANIZED HEALTH CARE EDUCATION/TRAINING PROGRAM

## 2019-05-13 PROCEDURE — 40000141 ZZH STATISTIC PERIPHERAL IV START W/O US GUIDANCE

## 2019-05-13 PROCEDURE — 85027 COMPLETE CBC AUTOMATED: CPT | Performed by: STUDENT IN AN ORGANIZED HEALTH CARE EDUCATION/TRAINING PROGRAM

## 2019-05-13 PROCEDURE — 25000132 ZZH RX MED GY IP 250 OP 250 PS 637

## 2019-05-13 PROCEDURE — 93010 ELECTROCARDIOGRAM REPORT: CPT | Performed by: INTERNAL MEDICINE

## 2019-05-13 PROCEDURE — 40000556 ZZH STATISTIC PERIPHERAL IV START W US GUIDANCE

## 2019-05-13 PROCEDURE — 25800030 ZZH RX IP 258 OP 636: Performed by: EMERGENCY MEDICINE

## 2019-05-13 PROCEDURE — 25000131 ZZH RX MED GY IP 250 OP 636 PS 637: Performed by: STUDENT IN AN ORGANIZED HEALTH CARE EDUCATION/TRAINING PROGRAM

## 2019-05-13 RX ORDER — ERGOCALCIFEROL 1.25 MG/1
50000 CAPSULE, LIQUID FILLED ORAL WEEKLY
Status: DISCONTINUED | OUTPATIENT
Start: 2019-05-19 | End: 2019-05-16 | Stop reason: HOSPADM

## 2019-05-13 RX ORDER — MUPIROCIN CALCIUM 20 MG/G
CREAM TOPICAL 2 TIMES DAILY
Status: DISCONTINUED | OUTPATIENT
Start: 2019-05-13 | End: 2019-05-16 | Stop reason: HOSPADM

## 2019-05-13 RX ORDER — LORAZEPAM 1 MG/1
1 TABLET ORAL
Status: COMPLETED | OUTPATIENT
Start: 2019-05-13 | End: 2019-05-13

## 2019-05-13 RX ORDER — DIPHENHYDRAMINE HCL 12.5MG/5ML
25 LIQUID (ML) ORAL 3 TIMES DAILY PRN
Status: DISCONTINUED | OUTPATIENT
Start: 2019-05-13 | End: 2019-05-16 | Stop reason: HOSPADM

## 2019-05-13 RX ORDER — DIPHENHYDRAMINE HYDROCHLORIDE AND LIDOCAINE HYDROCHLORIDE AND ALUMINUM HYDROXIDE AND MAGNESIUM HYDRO
10 KIT EVERY 6 HOURS PRN
Status: DISCONTINUED | OUTPATIENT
Start: 2019-05-13 | End: 2019-05-16 | Stop reason: HOSPADM

## 2019-05-13 RX ADMIN — VANCOMYCIN HYDROCHLORIDE 2000 MG: 1 INJECTION, POWDER, LYOPHILIZED, FOR SOLUTION INTRAVENOUS at 00:56

## 2019-05-13 RX ADMIN — POTASSIUM CHLORIDE 20 MEQ: 400 INJECTION, SOLUTION INTRAVENOUS at 11:51

## 2019-05-13 RX ADMIN — DIPHENHYDRAMINE HYDROCHLORIDE 25 MG: 25 SOLUTION ORAL at 17:41

## 2019-05-13 RX ADMIN — ONDANSETRON 4 MG: 4 TABLET, ORALLY DISINTEGRATING ORAL at 00:14

## 2019-05-13 RX ADMIN — OXYCODONE HYDROCHLORIDE 15 MG: 5 SOLUTION ORAL at 13:13

## 2019-05-13 RX ADMIN — ONDANSETRON 4 MG: 4 TABLET, ORALLY DISINTEGRATING ORAL at 13:13

## 2019-05-13 RX ADMIN — OXYCODONE HYDROCHLORIDE 15 MG: 5 SOLUTION ORAL at 17:37

## 2019-05-13 RX ADMIN — DEXTROAMPHETAMINE SACCHARATE, AMPHETAMINE ASPARTATE, DEXTROAMPHETAMINE SULFATE AND AMPHETAMINE SULFATE 20 MG: 2.5; 2.5; 2.5; 2.5 TABLET ORAL at 08:38

## 2019-05-13 RX ADMIN — ONDANSETRON 4 MG: 4 TABLET, ORALLY DISINTEGRATING ORAL at 21:59

## 2019-05-13 RX ADMIN — ASCORBIC ACID, VITAMIN A PALMITATE, CHOLECALCIFEROL, THIAMINE HYDROCHLORIDE, RIBOFLAVIN-5 PHOSPHATE SODIUM, PYRIDOXINE HYDROCHLORIDE, NIACINAMIDE, DEXPANTHENOL, ALPHA-TOCOPHEROL ACETATE, VITAMIN K1, FOLIC ACID, BIOTIN, CYANOCOBALAMIN: 200; 3300; 200; 6; 3.6; 6; 40; 15; 10; 150; 600; 60; 5 INJECTION, SOLUTION INTRAVENOUS at 15:33

## 2019-05-13 RX ADMIN — VANCOMYCIN HYDROCHLORIDE 1250 MG: 10 INJECTION, POWDER, LYOPHILIZED, FOR SOLUTION INTRAVENOUS at 18:13

## 2019-05-13 RX ADMIN — CARVEDILOL 6.25 MG: 6.25 TABLET, FILM COATED ORAL at 17:37

## 2019-05-13 RX ADMIN — POTASSIUM CHLORIDE 20 MEQ: 400 INJECTION, SOLUTION INTRAVENOUS at 13:00

## 2019-05-13 RX ADMIN — VANCOMYCIN HYDROCHLORIDE 1250 MG: 10 INJECTION, POWDER, LYOPHILIZED, FOR SOLUTION INTRAVENOUS at 10:29

## 2019-05-13 RX ADMIN — LORAZEPAM 1 MG: 1 TABLET ORAL at 22:00

## 2019-05-13 RX ADMIN — DIPHENHYDRAMINE HYDROCHLORIDE 25 MG: 50 INJECTION, SOLUTION INTRAMUSCULAR; INTRAVENOUS at 00:39

## 2019-05-13 RX ADMIN — DIPHENHYDRAMINE HYDROCHLORIDE 25 MG: 25 SOLUTION ORAL at 09:45

## 2019-05-13 RX ADMIN — I.V. FAT EMULSION 250 ML: 20 EMULSION INTRAVENOUS at 21:15

## 2019-05-13 RX ADMIN — OXYCODONE HYDROCHLORIDE 15 MG: 5 SOLUTION ORAL at 01:05

## 2019-05-13 RX ADMIN — DIPHENHYDRAMINE HYDROCHLORIDE AND LIDOCAINE HYDROCHLORIDE AND ALUMINUM HYDROXIDE AND MAGNESIUM HYDRO 10 ML: KIT at 17:37

## 2019-05-13 RX ADMIN — DIPHENHYDRAMINE HYDROCHLORIDE AND LIDOCAINE HYDROCHLORIDE AND ALUMINUM HYDROXIDE AND MAGNESIUM HYDRO 10 ML: KIT at 09:38

## 2019-05-13 RX ADMIN — CARVEDILOL 6.25 MG: 6.25 TABLET, FILM COATED ORAL at 08:39

## 2019-05-13 RX ADMIN — OXYCODONE HYDROCHLORIDE 15 MG: 5 SOLUTION ORAL at 08:38

## 2019-05-13 RX ADMIN — OXYCODONE HYDROCHLORIDE 15 MG: 5 SOLUTION ORAL at 21:59

## 2019-05-13 ASSESSMENT — ACTIVITIES OF DAILY LIVING (ADL)
ADLS_ACUITY_SCORE: 11
ADLS_ACUITY_SCORE: 12
DEPENDENT_IADLS:: MEDICATION MANAGEMENT;TRANSPORTATION;SHOPPING
ADLS_ACUITY_SCORE: 13
ADLS_ACUITY_SCORE: 13

## 2019-05-13 NOTE — PROGRESS NOTES
CLINICAL NUTRITION SERVICES - ASSESSMENT NOTE     Nutrition Prescription    RECOMMENDATIONS FOR MDs/PROVIDERS TO ORDER:  1. Continue to monitor electrolytes and replace per protocol - potassium low (3.3) this am, to be replaced prior to start of TPN.    2. Recommend rechecking vitamin A and vitamin D levels and replace if low. Deficient at last check on 1/6/19    Malnutrition Status:     Patient does not meet two of the above criteria necessary for diagnosing malnutrition    Recommendations already ordered by Registered Dietitian (RD):  Ordered with PharmD to begin PPN as follows:  - Peripheral Clinimix @ 105 mL/hr to provide 2520 mL/day, 126 gm Dex (428 kcal), 107g protein (428 kcal).   - IV lipids daily (500 kcal/day)   - Total provisions = 1357 kcal (15 kcal/kg) and 1.2 g protein/kg/day. This meets 75% of assessed energy needs and 100% of assessed protein neds.     Future/Additional Recommendations:  1. Once pt has central access, recommend start CPN, per home regimen as follows:  --Use dosing weight 89 kg  --Begin CPN at goal 1800 ml/day over 14 hours with initial 235g Dex daily (799kcal, GIR3.1), 132g AA daily (528 kcal), and 250 ml 20% IV lipids 5x/wk.  Micro/Rx: Standard per PharmD   - Order TG labs and LFTs every Monday     REASON FOR ASSESSMENT  Parker Escobar Curtis Elizabeth. 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 Pt cannot tolerate adequate PO intake and is TPN dependant. Pt can tolerate small bites of pudding, yogurt and sandwiches. Per discussion with pt's wife, pt had no issues with TPN infusion prior to admit. Typically pt infuses PN 12-14h overnight.   Per [facility], pt's TPN order providing 1800 mL/day (over 14h), 235g Dex (GIR  "3.5), 132g AA (1.7 g/kg), and IV lipids 5x/week for a total of 1735 kcal/day (20 kcal/kg) and 21% kcal from fat, per dosing wt of 76.4 kg.        CURRENT NUTRITION ORDERS  Diet: Clear Liquid  Intake/Tolerance: None     LABS  Labs reviewed  K+ 3.3 (L) on 5/13  TG 71 WNL on 5/10    MEDICATIONS  Medications reviewed  Vitamin D2 (50,000 international units weekly)  Vitamin A 0.2 (L) on 1/6/19    ANTHROPOMETRICS  Height: 180.3 cm (5' 11\")  Most Recent Weight: 88.5 kg (194.7 lbs) on 4/10/19      IBW: 78.2 kg  BMI: 27.23 kg/m2 (Overweight BMI 25-29.9)  Weight History: No recent significant changes in weight  Wt Readings from Last 10 Encounters:   04/10/19 88.5 kg (195 lb)   03/13/19 90.7 kg (200 lb)   03/07/19 91.7 kg (202 lb 3.2 oz)   02/16/19 78.9 kg (174 lb)   01/18/19 89.2 kg (196 lb 9.6 oz)   01/08/19 88 kg (194 lb)   10/30/18 91.6 kg (202 lb)   10/29/18 88.9 kg (196 lb)   10/13/18 87.5 kg (192 lb 14.4 oz)   10/05/18 86.6 kg (191 lb)     Dosing Weight: 89 kg    ASSESSED NUTRITION NEEDS  Estimated Energy Needs: 1993-6677 kcals/day (20 - 25 kcals/kg)  Justification: Maintenance on PN  Estimated Protein Needs: 107-134 grams protein/day (1.2-1.5 grams of pro/kg)  Justification: Increased needs with acute illness  Estimated Fluid Needs: 6966-1978 mL/day (1 mL/kcal)   Justification: Maintenance    PHYSICAL FINDINGS  See malnutrition section below.    MALNUTRITION  % Intake: No decreased intake noted  % Weight Loss: None noted  Subcutaneous Fat Loss: None observed  Muscle Loss: None observed  Fluid Accumulation/Edema: None noted  Malnutrition Diagnosis: Patient does not meet two of the above criteria necessary for diagnosing malnutrition    NUTRITION DIAGNOSIS  Inadequate protein-energy intake related to altered GI function and reliance on TPN to meet nutrition needs as evidenced by CLD diet and order for RN/Pharm to start TPN      INTERVENTIONS  Implementation  Nutrition education: Discussed nutrition POC with pt's " wife  Collaboration with other providers - Ordered TPN with PharmD  Parenteral Nutrition/IV Fluids - Initiate      Goals  Total avg nutritional intake to meet a minimum of 20 kcal/kg and 1.2 g PRO/kg daily (per dosing wt 89 kg).     Monitoring/Evaluation  Progress toward goals will be monitored and evaluated per protocol.    Jacquelyn Benoit, RD, LD  7A pgr: [phone]

## 2019-05-13 NOTE — H&P
History and Physical  Parker Acevedo Sr. MRN: [id number]  Age: 48 year old, : 1970  Primary care provider: Chuy Isaac           Chief Complaint:     Fatigue          History of Present Illness:     HISTORY OF PRESENT ILLNESS:  Parker Acevedo Sr. is a 48 year old with PMH that is pertinent for gastric bypass () with subsequent revision to treat marginal ulceration. This required further intervention with GJ resection c/w chronic malnutarion requiring remnant gastgrostomy , short gut syndrome with TPN through PICC line HFrEF 2/2 NICM , who presented to the ED after he was found to have a positive blood culture from PICC line.    Patient and wife report that over the past two weeks he has been having low grade fever that has been getting worse. Wife keeps a log off vitals and it seems like fevers started around  and highest was 101.6 on . Patient also reports having diffuse fatigue with this. He also noticed a rash on his arms and legs approximately one week ago. He has chronic abdominal pain diarrhea that alternates with constipation that is unchanged. He noted increase bloody discharge from gastric port. Patient also had an episode of aspiration on Friday but has not had cough or SOB since then. On 5/10 he had blood cultures drawn that came back positive for GPCs. Patient was advised to present to the ED.     Upon arrival to the ED patient as HD stable with HR 80, /100, Spo2 99% on RA. Labs were pertinent for K 3.3, Hgb 12.4, lactate 0.7. Repeat blood cultures were drawn , patient was given IV Vancomycin and admitted for further evaluation.             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)     mild EF ~45% on rest 17, improves with stressing     Cardiomyopathy in nutritional diseases  (H)      Chronic abdominal pain      Complication of anesthesia      Difficulty swallowing      Gastric ulcer, unspecified as acute or chronic, without mention of hemorrhage, perforation, or obstruction      Gastro-oesophageal reflux disease      Generalized weakness 1/30/2018     Head injury      Hiatal hernia      Other bladder disorder      Other chronic pain      PONV (postoperative nausea and vomiting)      Severe malnutrition (H)     TPN     Short gut syndrome      Tobacco abuse        Past Surgical History:   Procedure Laterality Date     AMPUTATION       APPENDECTOMY       BACK SURGERY  11/3/2014    curve in the spine     BIOPSY LYMPH NODE CERVICAL N/A 2/20/2015    Procedure: BIOPSY LYMPH NODE CERVICAL;  Surgeon: Baron Scanlon MD;  Location: [facility]     C GASTRIC BYPASS,OBESE<100CM SHAYLEE-EN-Y  2002    lost 300 pounds     CHOLECYSTECTOMY       DISCECTOMY, FUSION CERVICAL ANTERIOR ONE LEVEL, COMBINED N/A 2/15/2017    Procedure: COMBINED DISCECTOMY, FUSION CERVICAL ANTERIOR ONE LEVEL;  Surgeon: Darren Campos MD;  Location: [facility]     ENDOSCOPIC INSERTION TUBE GASTROSTOMY  9/9/2013    Procedure: ENDOSCOPIC INSERTION TUBE GASTROSTOMY;;  Surgeon: Francis Vyas MD;  [facility]     ENDOSCOPIC ULTRASOUND UPPER GASTROINTESTINAL TRACT (GI)  4/29/2011    Procedure:ENDOSCOPIC ULTRASOUND UPPER GASTROINTESTINAL TRACT (GI); Both Procedures done Conjointly; Surgeon:NEREIDA HOUSER; [facility]     ENDOSCOPIC ULTRASOUND UPPER GASTROINTESTINAL TRACT (GI)  9/9/2013    Procedure: ENDOSCOPIC ULTRASOUND UPPER GASTROINTESTINAL TRACT (GI);  Endoscopic Ultrasound Guide Gastrostomy Tube Placement  C-arm;  Surgeon: Noe Lizarraga MD;  [facility]     ENDOSCOPY  03/25/11    EGD, [facility]     ENDOSCOPY  08/04/09    Upper Endoscopy, [facility]     ENDOSCOPY  01/05/09    Upper Endoscopy, [facility]     ESOPHAGOSCOPY, GASTROSCOPY, DUODENOSCOPY (EGD), COMBINED  4/20/2011     Procedure:COMBINED ESOPHAGOSCOPY, GASTROSCOPY, DUODENOSCOPY (EGD); Surgeon:BLU VYAS; [facility]     ESOPHAGOSCOPY, GASTROSCOPY, DUODENOSCOPY (EGD), COMBINED  6/15/2011    Procedure:COMBINED ESOPHAGOSCOPY, GASTROSCOPY, DUODENOSCOPY (EGD); Surgeon:BLU VYAS; [facility]     ESOPHAGOSCOPY, GASTROSCOPY, DUODENOSCOPY (EGD), COMBINED  6/12/2013    Procedure: COMBINED ESOPHAGOSCOPY, GASTROSCOPY, DUODENOSCOPY (EGD);;  Surgeon: Blu Vyas MD;  [facility]     ESOPHAGOSCOPY, GASTROSCOPY, DUODENOSCOPY (EGD), COMBINED  11/22/2013    Procedure: COMBINED ESOPHAGOSCOPY, GASTROSCOPY, DUODENOSCOPY (EGD);;  Surgeon: Blu Vyas MD;  [facility]     ESOPHAGOSCOPY, GASTROSCOPY, DUODENOSCOPY (EGD), COMBINED  4/30/2014    Procedure: COMBINED ESOPHAGOSCOPY, GASTROSCOPY, DUODENOSCOPY (EGD);  Surgeon: Blu Vyas MD;  [facility]     ESOPHAGOSCOPY, GASTROSCOPY, DUODENOSCOPY (EGD), COMBINED N/A 2/20/2015    Procedure: COMBINED ESOPHAGOSCOPY, GASTROSCOPY, DUODENOSCOPY (EGD), BIOPSY SINGLE OR MULTIPLE;  Surgeon: Baron cSanlon MD;  Location:  OR     ESOPHAGOSCOPY, GASTROSCOPY, DUODENOSCOPY (EGD), COMBINED N/A 9/30/2015    Procedure: COMBINED ESOPHAGOSCOPY, GASTROSCOPY, DUODENOSCOPY (EGD);  Surgeon: Blu Vyas MD;  [facility]     GASTRECTOMY  6/22/2012    Procedure: GASTRECTOMY;  Open Approach, Excise Ulcers,Partial Gastrectomy, Esophagojejunostomy, Hiatal Hernia Repair, Extensive Lysis of Adhesions and Esaphagogastrodudenoscopy.;  Surgeon: Blu Vyas MD;  [facility]     GASTROJEJUNOSTOMY  08/26/09    Extensice enterolysis, partial resect. jejunum, part. resect gastric pouch, gastrojejunostomy anastomosis     HC ESOPH/GAS REFLUX TEST W NASAL IMPED ELECTRODE  8/5/2013    Procedure: ESOPHAGEAL IMPEDENCE FUNCTION TEST 1 HOUR OR LESS;  Surgeon: Halie Lang MD;  Location:  GI     HEAD & NECK SURGERY  2/15/2017    C5-C6      HERNIA REPAIR  2006    Umbilical hernia     HERNIORRHAPHY HIATAL  2012    Procedure: HERNIORRHAPHY HIATAL;;  Surgeon: Francis Vyas MD;  [facility]     HERNIORRHAPHY INGUINAL  2013    Procedure: HERNIORRHAPHY INGUINAL;;  Surgeon: Francis Vyas MD;  [facility]     INSERT PORT VASCULAR ACCESS Right 2017    Procedure: INSERT PORT VASCULAR ACCESS;  Right Chest Port Placement ;  Surgeon: Lisandro Alejandro PA-C;  [facility]     INSERT PORT VASCULAR ACCESS Right 2018    Procedure: INSERT PORT VASCULAR ACCESS;  Place single lumen tunneled central venous access catheter;  Surgeon: Guy Jamil PA-C;  [facility]     IR PICC PLACEMENT > 5 YRS OF AGE  3/7/2019     LAPAROTOMY EXPLORATORY  2013    Procedure: LAPAROTOMY EXPLORATORY;  Exploratory Laparotomy, Upper Endoscopy, Left Inguinal Hernia Repair;  Surgeon: Francis Vyas MD;  [facility]     ORTHOPEDIC SURGERY       PICC INSERTION Right 2017    5fr DL BioFlo PICC, 42cm (3cm external) in the R medial brachial vein w/ tip in the SVC RA junction.     PICC INSERTION Left 2017    5fr DL BioFlo PICC, 45cm (1cm external) in the L basilic vein w/ tip in the SVC RA junction.     SHAYLEE EN Y BOWEL       SOFT TISSUE SURGERY       SOFT TISSUE SURGERY       THORACIC SURGERY       TONSILLECTOMY       TRANSESOPHAGEAL ECHOCARDIOGRAM INTRAOPERATIVE N/A 2019    Procedure: TRANSESOPHAGEAL ECHOCARDIOGRAM INTRAOPERATIVE;  Surgeon: GENERIC ANESTHESIA PROVIDER;  [facility]             Social History:     Social History     Tobacco Use     Smoking status: Current Some Day Smoker     Packs/day: 0.25     Years: 3.00     Pack years: 0.75     Types: Cigarettes     Last attempt to quit: 2018     Years since quittin.7     Smokeless tobacco: Never Used     Tobacco comment: I use an e cig every now and than   Substance Use Topics     Alcohol use: No     Comment: quit                Family History:     Family History   Problem Relation Age of Onset     Gastrointestinal Disease Mother         Crohns disease     Anxiety Disorder Mother      Thyroid Disease Mother         Grave's disease     Cancer Father         ear cancer-skin cancer/melanoma     Breast Cancer Maternal Grandmother      Macular Degeneration Maternal Grandfather      Anxiety Disorder Sister      Diabetes Maternal Uncle      Breast Cancer Other      Hypertension No family hx of      Hyperlipidemia No family hx of      Cerebrovascular Disease No family hx of      Prostate Cancer No family hx of      Depression No family hx of      Anesthesia Reaction No family hx of      Asthma No family hx of      Osteoporosis No family hx of      Genetic Disorder No family hx of      Obesity No family hx of      Mental Illness No family hx of      Substance Abuse No family hx of      Glaucoma No family hx of              Allergies:     Allergies   Allergen Reactions     Bactrim [Sulfamethoxazole W/Trimethoprim] Rash     Penicillins Anaphylaxis     Please see Antimicrobial Management Team allergy assessment note 10/10/2018. Patient reported tolerating amoxicillin.     Doxycycline Rash     Vancomycin Rash     Rash after receiving vancomycin 3/28/16 (red man's?). Tolerated with slower infusion and diphenhydramine premed.            ROS : 10 point review of systems was performed and was negative other than HPI         Medications:     PTA Meds  Prior to Admission medications    Medication Sig Last Dose Taking? Auth Provider   acetaminophen (TYLENOL) 32 mg/mL liquid Take 500 mg by mouth every 4 hours as needed for fever or mild pain   Reported, Patient   albuterol (PROAIR HFA/PROVENTIL HFA/VENTOLIN HFA) 108 (90 Base) MCG/ACT Inhaler Inhale 2 puffs into the lungs every 4 hours as needed for shortness of breath / dyspnea or wheezing   Esetban Daly MD   amphetamine-dextroamphetamine (ADDERALL) 20 MG tablet Take 1 tablet (20 mg) by mouth daily    "Chuy Isaac MD   B-D TB SYRINGE 27G X 1/2\" 1 ML MISC    Reported, Patient   blood glucose (NO BRAND SPECIFIED) lancets standard Use to test blood sugar 1 times daily or as directed.   Chuy Isaac MD   blood glucose (NO BRAND SPECIFIED) test strip Use to test blood sugar 1 times daily or as directed.   Chuy Isaac MD   blood glucose monitoring (NO BRAND SPECIFIED) meter device kit Use to test blood sugar 1 times daily or as directed.   Chuy Isaac MD   carvedilol (COREG) 6.25 MG tablet Take 6.25 mg by mouth 2 times daily (with meals)   Unknown, Entered By History   [facility] INFUSION MANAGED PATIENT Contact [facility] for patient specific medication information at [phone] on admission and discharge from the hospital.  Phones are answered 24 hours a day 7 days a week 365 days a year.    Providers - Choose \"CONTINUE HOME MED (no script)\" at discharge if patient treatment with home infusion will continue.   Ilsa Shine PA   lidocaine (LIDODERM) 5 % Patch Place 1-2 patches onto the skin every 24 hours Wear for 12 hours, remove for 12 hours.  OK to cut to better fit to size.   Patti Milligan MD   LORazepam (ATIVAN) 1 MG tablet 1-2 mg as needed for anxiety with TPN and medications   Chuy Isaac MD   naloxone (NARCAN) 4 MG/0.1ML nasal spray Spray 4 mg into one nostril alternating nostrils once as needed every 2-3 minutes until assistance arrives   Reported, Patient   Needle, Disp, (BD DISP NEEDLES) 27G X 1/2\" MISC 1 Device every 30 days Use for cyanocobalamin injection once q 30 days.   Esteban Daly MD   ondansetron (ZOFRAN-ODT) 8 MG ODT tab DISSOLVE ONE TABLET ON TONGUE EVERY 8 HOURS AS NEEDED FOR NAUSEA   Chuy Isaac MD   order for DME Equipment being ordered: Bilateral knee high chronic venous insufficiency stockings--  mild-moderate pressures.   Esteban Daly MD   order for DME Injection Supplies for Vitamin B12: 3cc syringes w/ 27 " gauge needles, 1/2 inch length   Anita Méndez PA-C   oxyCODONE (ROXICODONE) 5 MG/5ML solution Take 10-15 mLs (10-15 mg) by mouth every 4 hours as needed for pain Max 60 mg/day. Fill 4/25/19 to start on 4/28/19.   Patti Milligan MD   polyethylene glycol (MIRALAX/GLYCOLAX) powder Take 17 g (1 capful) by mouth daily   Patti Milligan MD   sodium chloride 0.9% infusion Inject 1,000-2,000 mLs into the vein as needed    Unknown, Entered By History   vitamin B-12 (CYANOCOBALAMIN) 1000 MCG/ML injection Inject 1 mL (1,000 mcg) into the muscle every 30 days   Chuy Isaac MD   vitamin D2 (ERGOCALCIFEROL) 62961 units (1250 mcg) capsule Take 1 capsule (50,000 Units) by mouth once a week   Chuy Isaac MD      Current Meds    diphenhydrAMINE  25 mg Oral Once     diphenhydrAMINE  25 mg Intravenous Once     HYDROcodone-acetaminophen  1 tablet Oral Once     ondansetron  4 mg Oral Once     [START ON 5/13/2019] vancomycin (VANCOCIN) IV  1,250 mg Intravenous Q8H    Followed by     vancomycin (VANCOCIN) IV  25 mg/kg Intravenous Once     Infusion Meds      ALLERGIES:    Allergies   Allergen Reactions     Bactrim [Sulfamethoxazole W/Trimethoprim] Rash     Penicillins Anaphylaxis     Please see Antimicrobial Management Team allergy assessment note 10/10/2018. Patient reported tolerating amoxicillin.     Doxycycline Rash     Vancomycin Rash     Rash after receiving vancomycin 3/28/16 (red man's?). Tolerated with slower infusion and diphenhydramine premed.                     Physical Exam:     B/P: 122/76, T: 97.5, P: 72, R: 16    Wt Readings from Last 4 Encounters:   04/10/19 88.5 kg (195 lb)   03/13/19 90.7 kg (200 lb)   03/07/19 91.7 kg (202 lb 3.2 oz)   02/16/19 78.9 kg (174 lb)       General: NAD, resting comfortably on exam, appears stated age,pleasant and cooperative, AAOx3.  HEENT: NC/AT, well injected Conjunctiva, anicteric sclera, EOMI; PERRL, MMM;   Neck: Trachea midline; supple, good tone;  full ROM; negative for Lymphoadenopathy, and JVD  Chest: decreased air entry RLL/RML   CV: RRR; nl S1/S2, no murmurs  Abd: Soft, diffuse tender, G-port in place (+) BS;   Ext: Warm/well perfused; no Edema.  Skin: Clear; unbroken; no new rashes  Neuro: - MS: AAO x3 - no focal deficit               Data:       DIAGNOSTIC STUDIES  ROUTINE ICU LABS (Last four results)  CMP  Recent Labs   Lab 05/12/19  2111 05/10/19  1525    145*   POTASSIUM 3.3* 3.5   CHLORIDE 108 110*   CO2 29 29   ANIONGAP 5  --    GLC 88 102*   BUN 13 20   CR 0.68 0.58*   GFRESTIMATED >90 >90   GFRESTBLACK >90 >90   SILAS 8.6 8.1*   MAG  --  2.3   PHOS  --  4.4   PROTTOTAL 7.4 6.5*   ALBUMIN 3.8 3.3*   BILITOTAL 0.5 0.3   ALKPHOS 93 85   AST 16 17   ALT 28 28     CBC  Recent Labs   Lab 05/12/19  2111 05/10/19  1525   WBC 7.8 6.8   RBC 4.02* 3.61*   HGB 12.4* 11.6*   HCT 38.8* 35.0*   MCV 97 97   MCH 30.8 32.1   MCHC 32.0 33.1   RDW 12.6 12.8    153     INRNo lab results found in last 7 days.  Arterial Blood GasNo lab results found in last 7 days.    MICROBIOLOGY  Blood culture 5/10 GPCs (gene negative for staph, strep) in 2/2 bottles  Blood culture 5/12 NGTD    IMAGING  CXR 5/12: Subtle opacities over the medial right lower lung,  subsegmental atelectasis versus infection.    Echo 1/4/2019:  No vegetation or mass identified, however this does not exclude endocarditis.  Global and regional left ventricular function is normal with an EF of 60-65%.  The right ventricle is normal size.  Global right ventricular function is normal.  No significant valvular dysfunction.    Assessment and Plan:     Parker Acevedo . is a 48 year old with PMH that is pertinent for gastric bypass (2004) with subsequent revision to treat marginal ulceration. This required further intervention with GJ resection c/w chronic malnutarion , short gut syndrome with TPN through PICC line HFrEF 2/2 NICM , who presented to the ED after he was found to have a positive  blood culture from PICC line.    #Positive blood cultures with GPCs  #Fatigue  #Gastric bypass complicated by ulceration requiring multiple revisions  #Chronic malnutrution , TPN dependant throught PICC line   Patient is completely TPN dependant. He has a Gastrostomy tube for venting only. He had routine blood draw from PICC on 5/10 that are now growing GPCs. Negative for staph, strep, enterococcus. Unclear if true infection vs colonization however given new onset fever will repeat blood cultures and monitor. Vancomycin started in the ED will continue for now.      - Repeat Blood cultures , 2 sets from periphery and PICC line  - Continue Vancomycin for now   - No stigmata of infective endocarditis on exam   - Check TSH , Phosphorus     #Papular rash  Likely folliculitis based on exam. Subacute Infective endocarditis can cause complement mediated leukocytoclastic vasculitis though this seems unlikely based on his exam.   - Mupirocin to affected area     #CXR with RML infiltrate  Likely related to recent aspiration episode. No PNA specific Abx needed.    #Hypokalemia   Replacement protocol     #HFrEF with recovered EF 2/2 NICM   - BB: Carvedilol 6.25 mg BID  - ACE/ARB: on none   - Ald Ant: EF>35%  - Volume: euvolemic     - Chronic conditions   #Chronic Pain: continue PTA oxycodone   #B12 deficiency: hold Q1 month b12 injection while inpatient   #VDD: continue PTA once a week ergocalciferol  #ADD: continue PTA adderal     Prophylaxis:  DVT: Ambulate GI: magic mouth wash  Family: Wife at bedside  Disposition: Pending blood culture results  Code Status: Full    Patient to be staffed in the AM.    Ariana Duarte MD  Internal Medicine, PGY-3  Pager [phone]

## 2019-05-13 NOTE — PROGRESS NOTES
Clinic Care Coordination Contact    Situation: Patient chart reviewed by care coordinator.    Background: RN CC reviewing chart for follow up.    Assessment: Patient is currently admitted at [facility].    Plan/Recommendations: RN CC will monitor chart for patient discharge.    Melissa Behl BSN, RN, PHN  Primary Care Clinical RN Care Coordinator  [facility]   [phone]

## 2019-05-13 NOTE — PROGRESS NOTES
[facility], [place]    Progress Note - Dez 2 Service        Date of Admission:  5/12/2019    Assessment & Plan   Parker Acevedo Sr. is a 48 year old with PMH that is pertinent for gastric bypass (2004) with subsequent revision to treat marginal ulceration. This required further intervention with GJ resection c/w chronic malnutarion , short gut syndrome with TPN through PICC line HFrEF 2/2 NICM , who presented to the ED after he was found to have a positive blood culture from PICC line.    Today's changes  - Replenish K  - Continue vancomycin  - Awaiting blood cultures from PICC and periphery    #Positive blood cultures with GPCs  #Fatigue  #Gastric bypass complicated by ulceration requiring multiple revisions  #Chronic malnutrution , TPN dependant throught PICC line   Patient is completely TPN dependant. He has a Gastrostomy tube for venting only. He had routine blood draw from PICC on 5/10 that are now growing GPCs. Negative for staph, strep, enterococcus. Unclear if true infection vs colonization, however given recent fever blood culture from PICC and peripheral site repeated.   - No growth after  15 hours from PICC and peripheral blood cultures  - Continue Vancomycin   - Start TPN through peripheral IV  - Avoid use of PICC  - No stigmata of infective endocarditis on exam     #Papular rash - resolved  Suspected folliculitis of the abdomen, thighs and back on initial exam. Mupirocin ordered, but not applied to areas. No current signs of papular rashes.     #CXR with RML infiltrate  Likely related to recent aspiration episode. No PNA specific Abx needed.    #Hypokalemia   Replacement protocol     #HFrEF with recovered EF 2/2 NICM   - BB: Carvedilol 6.25 mg BID  - ACE/ARB: on none   - Ald Ant: EF>35%  - Volume: euvolemic     - Chronic conditions   #Chronic Pain: continue PTA oxycodone   #B12 deficiency: hold Q1 month b12 injection while inpatient   #VDD: continue PTA once a  week ergocalciferol  #ADD: continue PTA adderal         Diet: Regular Diet Adult  parenteral nutrition - Clinimix E    Fluids: none  Lines: left PICC, right arm PIV  DVT Prophylaxis: ambulation  Sanchez Catheter: not present  Code Status: Full Code      Disposition Plan   Pending blood cultures to confirm bacteriemia        The patient's care was discussed with the attending Dr. Pamela Serna, DMD  [place] [facility]  Oral and Maxillofacial Surgery PGY1  [phone]   ______________________________________________________________________    Interval History   No fever or chills overnight.     Physical Exam   Vital Signs: Temp: 97.6  F (36.4  C) Temp src: Oral BP: 113/61 Pulse: 72 Heart Rate: 55 Resp: 16 SpO2: 99 % O2 Device: None (Room air)    Weight: 206 lbs 9.6 oz  General: NAD, resting comfortably on exam, appears stated age,pleasant and cooperative, AAOx3.  HEENT: NC/AT, well injected Conjunctiva, anicteric sclera, EOMI; PERRL, MMM;   Neck: Trachea midline; supple, good tone; full ROM; negative for Lymphoadenopathy, and JVD  Chest: decreased air entry RLL/RML   CV: RRR; nl S1/S2, no murmurs  Abd: Soft, diffuse tender, G-port in place (+) BS;   Ext: Warm/well perfused; no Edema.  Skin: Clear; unbroken; no new rashes  Neuro: - MS: AAO x3 - no focal deficit     Data

## 2019-05-13 NOTE — ED TRIAGE NOTES
Pt presents to ER with possible sepsis. Pt states that he had blood cultures drawn at [facility] on Friday and was notified today that blood cultures came back positive. Pt states he feels weak and has a generalized rash.

## 2019-05-13 NOTE — PLAN OF CARE
/76   Pulse 72   Temp 97.5  F (36.4  C) (Oral)   Resp 16   SpO2 98%      Arrived to 7A @ 0000 from ED with wife and personal belongings (cane, sunglasses, clothing, bryon pack). Pt and wife oriented to unit, call light and pt room. Pt education on hand hygiene, pain management & fall prevention provided.    VSS on RA, afebrile. Endorsed abdominal pain, given PRN oxycodone 15 mg with relief. Endorsed nausea, given ODT zofran 4 mg with relief. Pre-med IV benadryl 25 mg given prior to IV vancomycin via R PIV, no reaction. Next dose @ 0830. L PICC NS locked. G tube clamped, cleansed w microcleanse & new dressing applied, CDI.  Up w SBA to bathroom, voiding not saving. Slept through the night, wife at bedside. Continue to monitor and update [facility] team with acute changes.

## 2019-05-13 NOTE — PROGRESS NOTES
Care Coordinator Progress Note    Admission Date/Time:  5/12/2019  Attending MD:  Romeo Bingham*    Data  Chart reviewed, discussed with interdisciplinary team.   Patient was admitted for:    Bacteremia  Malnutrition, unspecified type (H).    Concerns with insurance coverage for discharge needs: None.  Current Living Situation: Patient lives with spouse.  Support System: Supportive and Involved  Services Involved: Home Infusion  Transportation at Discharge: Family or friend will provide  Transportation to Medical Appointments:   - Name of caregiver: Rose Acveedo, spouse  Barriers to Discharge: Medical Stability    Coordination of Care and Referrals: Provided patient/family with options for Home Infusion.       Per the team, discharge pending blood culture results. Prior to admission, patient received TPN through [facility].     Writer met with patient and his wife to review the role of the care coordinator and assess discharge needs. Patient confirmed that he would like to continue services with [facility]. Per patient's spouse, she provides central line dressing changes and patient goes to the clinic for labs. Patient and wife had no further questions or concerns regarding the discharge plan at the time of our conversation.      Plan  Anticipated Discharge Date:  5/15/2019  Anticipated Discharge Plan:  Home with resumption of services and clinic follow up as recommended by the team    GERRI Castaneda

## 2019-05-13 NOTE — PHARMACY-VANCOMYCIN DOSING SERVICE
Pharmacy Vancomycin Initial Note  Date of Service May 12, 2019  Patient's  1970  48 year old, male    Indication: Bacteremia    Current estimated CrCl = Estimated Creatinine Clearance: 169.9 mL/min (based on SCr of 0.68 mg/dL).    Creatinine for last 3 days  5/10/2019:  3:25 PM Creatinine 0.58 mg/dL  2019:  9:11 PM Creatinine 0.68 mg/dL    Recent Vancomycin Level(s) for last 3 days  No results found for requested labs within last 72 hours.      Vancomycin IV Administrations (past 72 hours)      No vancomycin orders with administrations in past 72 hours.                Nephrotoxins and other renal medications (From now, onward)    Start     Dose/Rate Route Frequency Ordered Stop    19 0730  vancomycin 1250 mg in 0.9% NaCl 250 mL intermittent infusion 1,250 mg      1,250 mg  over 2 Hours Intravenous EVERY 8 HOURS 19 2252      19 2330  vancomycin (VANCOCIN) 2,000 mg in sodium chloride 0.9 % 500 mL intermittent infusion      25 mg/kg × 88.5 kg  over 3 Hours Intravenous ONCE 19 2252            Contrast Orders - past 72 hours (72h ago, onward)    None                Plan:  1.  Start vancomycin  2000 mg IV once, then 1250 mg IV q8h based on past kinetics.   2.  Goal Trough Level: 15-20 mg/L   3.  Pharmacy will check trough levels as appropriate in 1-3 Days.    4. Serum creatinine levels will be ordered daily for the first week of therapy and at least twice weekly for subsequent weeks.    5. [place] method utilized to dose vancomycin therapy: Method 2    Maria Del Carmen Aquino, PharmD  Pager [phone]

## 2019-05-13 NOTE — ED PROVIDER NOTES
History     Chief Complaint   Patient presents with     Blood Infection     HPI  Parker Acevedo Sr. is a 48 year old male with a history of gastric bypass who presents to the Emergency Department today for evaluation of multiple complaints.  Upon review of the patient's chart, the patient had a positive blood culture that grew gram-positive cocci on 5/10.  The patient was then contacted and told to present to an ED for management.  The patient reports that he is having nausea, vomiting, tremors, headaches, sore throat, fevers, and ringing in his ears.  The patient reports that he has been having these symptoms for a couple of weeks.  He reports that his most recent PICC line was placed in April.  He reports that he has had sepsis in the past and his symptoms started similar to this.  He has been having fevers up to 101.  The patient otherwise reports that he does have a rash on his thighs bilaterally.    I have reviewed the Medications, Allergies, Past Medical and Surgical History, and Social History in the [facility] system.    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)      Chronic abdominal pain      Complication of anesthesia      Difficulty swallowing      Gastric ulcer, unspecified as acute or chronic, without mention of hemorrhage, perforation, or obstruction      Gastro-oesophageal reflux disease      Generalized weakness 1/30/2018     Head injury      Hiatal hernia      Other bladder disorder      Other chronic pain      PONV (postoperative nausea and vomiting)      Severe malnutrition (H)     TPN     Short gut syndrome      Tobacco abuse      Past Surgical History:   Procedure Laterality Date     AMPUTATION       APPENDECTOMY       BACK SURGERY  11/3/2014    curve in the spine     BIOPSY LYMPH  NODE CERVICAL N/A 2/20/2015    Procedure: BIOPSY LYMPH NODE CERVICAL;  Surgeon: Baron Scanlon MD;  Location: [facility] OR     C GASTRIC BYPASS,OBESE<100CM SHAYLEE-EN-Y  2002    lost 300 pounds     CHOLECYSTECTOMY       DISCECTOMY, FUSION CERVICAL ANTERIOR ONE LEVEL, COMBINED N/A 2/15/2017    Procedure: COMBINED DISCECTOMY, FUSION CERVICAL ANTERIOR ONE LEVEL;  Surgeon: Darren Campos MD;  [facility]     ENDOSCOPIC INSERTION TUBE GASTROSTOMY  9/9/2013    Procedure: ENDOSCOPIC INSERTION TUBE GASTROSTOMY;;  Surgeon: Francis Vyas MD;  [facility]     ENDOSCOPIC ULTRASOUND UPPER GASTROINTESTINAL TRACT (GI)  4/29/2011    Procedure:ENDOSCOPIC ULTRASOUND UPPER GASTROINTESTINAL TRACT (GI); Both Procedures done Conjointly; Surgeon:NEREIDA HOUSER; [facility]     ENDOSCOPIC ULTRASOUND UPPER GASTROINTESTINAL TRACT (GI)  9/9/2013    Procedure: ENDOSCOPIC ULTRASOUND UPPER GASTROINTESTINAL TRACT (GI);  Endoscopic Ultrasound Guide Gastrostomy Tube Placement  C-arm;  Surgeon: Noe Lizarraga MD;  [facility]     ENDOSCOPY  03/25/11    EGD, [facility]     ENDOSCOPY  08/04/09    Upper Endoscopy, [facility]     ENDOSCOPY  01/05/09    Upper Endoscopy, [facility]     ESOPHAGOSCOPY, GASTROSCOPY, DUODENOSCOPY (EGD), COMBINED  4/20/2011    Procedure:COMBINED ESOPHAGOSCOPY, GASTROSCOPY, DUODENOSCOPY (EGD); Surgeon:FRANCIS VYAS; [facility]     ESOPHAGOSCOPY, GASTROSCOPY, DUODENOSCOPY (EGD), COMBINED  6/15/2011    Procedure:COMBINED ESOPHAGOSCOPY, GASTROSCOPY, DUODENOSCOPY (EGD); Surgeon:FRANCIS VYAS; [facility]     ESOPHAGOSCOPY, GASTROSCOPY, DUODENOSCOPY (EGD), COMBINED  6/12/2013    Procedure: COMBINED ESOPHAGOSCOPY, GASTROSCOPY, DUODENOSCOPY (EGD);;  Surgeon: Francis Vyas MD;  [facility]     ESOPHAGOSCOPY, GASTROSCOPY, DUODENOSCOPY (EGD), COMBINED  11/22/2013    Procedure: COMBINED ESOPHAGOSCOPY, GASTROSCOPY, DUODENOSCOPY (EGD);;  Surgeon: Lilliam  Francis Castanon MD;  [facility]     ESOPHAGOSCOPY, GASTROSCOPY, DUODENOSCOPY (EGD), COMBINED  4/30/2014    Procedure: COMBINED ESOPHAGOSCOPY, GASTROSCOPY, DUODENOSCOPY (EGD);  Surgeon: Francis Vyas MD;  [facility]     ESOPHAGOSCOPY, GASTROSCOPY, DUODENOSCOPY (EGD), COMBINED N/A 2/20/2015    Procedure: COMBINED ESOPHAGOSCOPY, GASTROSCOPY, DUODENOSCOPY (EGD), BIOPSY SINGLE OR MULTIPLE;  Surgeon: Baron Scanlon MD;  [facility]     ESOPHAGOSCOPY, GASTROSCOPY, DUODENOSCOPY (EGD), COMBINED N/A 9/30/2015    Procedure: COMBINED ESOPHAGOSCOPY, GASTROSCOPY, DUODENOSCOPY (EGD);  Surgeon: Francis Vyas MD;  Location:  GI     GASTRECTOMY  6/22/2012    Procedure: GASTRECTOMY;  Open Approach, Excise Ulcers,Partial Gastrectomy, Esophagojejunostomy, Hiatal Hernia Repair, Extensive Lysis of Adhesions and Esaphagogastrodudenoscopy.;  Surgeon: Francis Vyas MD;  [facility]     GASTROJEJUNOSTOMY  08/26/09    Extensice enterolysis, partial resect. jejunum, part. resect gastric pouch, gastrojejunostomy anastomosis     HC ESOPH/GAS REFLUX TEST W NASAL IMPED ELECTRODE  8/5/2013    Procedure: ESOPHAGEAL IMPEDENCE FUNCTION TEST 1 HOUR OR LESS;  Surgeon: Halie Lang MD;  Location:  GI     HEAD & NECK SURGERY  2/15/2017    C5-C6     HERNIA REPAIR  2006    Umbilical hernia     HERNIORRHAPHY HIATAL  6/22/2012    Procedure: HERNIORRHAPHY HIATAL;;  Surgeon: Francis Vyas MD;  [facility]     HERNIORRHAPHY INGUINAL  11/22/2013    Procedure: HERNIORRHAPHY INGUINAL;;  Surgeon: Francis Vyas MD;  [facility]     INSERT PORT VASCULAR ACCESS Right 12/19/2017    Procedure: INSERT PORT VASCULAR ACCESS;  Right Chest Port Placement ;  Surgeon: Lisandro Alejandro PA-C;  Location:  OR     INSERT PORT VASCULAR ACCESS Right 8/2/2018    Procedure: INSERT PORT VASCULAR ACCESS;  Place single lumen tunneled central venous access catheter;  Surgeon: Guy Jamil,  BRITTANY;  [facility]     IR PICC PLACEMENT > 5 YRS OF AGE  3/7/2019     LAPAROTOMY EXPLORATORY  11/22/2013    Procedure: LAPAROTOMY EXPLORATORY;  Exploratory Laparotomy, Upper Endoscopy, Left Inguinal Hernia Repair;  Surgeon: Francis Vyas MD;  [facility]     ORTHOPEDIC SURGERY       PICC INSERTION Right 03/16/2017    5fr DL BioFlo PICC, 42cm (3cm external) in the R medial brachial vein w/ tip in the SVC RA junction.     PICC INSERTION Left 09/23/2017    5fr DL BioFlo PICC, 45cm (1cm external) in the L basilic vein w/ tip in the SVC RA junction.     SHAYLEE EN Y BOWEL  2003     SOFT TISSUE SURGERY       SOFT TISSUE SURGERY       THORACIC SURGERY       TONSILLECTOMY       TRANSESOPHAGEAL ECHOCARDIOGRAM INTRAOPERATIVE N/A 1/8/2019    Procedure: TRANSESOPHAGEAL ECHOCARDIOGRAM INTRAOPERATIVE;  Surgeon: GENERIC ANESTHESIA PROVIDER;  [facility]     Family History   Problem Relation Age of Onset     Gastrointestinal Disease Mother         Crohns disease     Anxiety Disorder Mother      Thyroid Disease Mother         Grave's disease     Cancer Father         ear cancer-skin cancer/melanoma     Breast Cancer Maternal Grandmother      Macular Degeneration Maternal Grandfather      Anxiety Disorder Sister      Diabetes Maternal Uncle      Breast Cancer Other      Hypertension No family hx of      Hyperlipidemia No family hx of      Cerebrovascular Disease No family hx of      Prostate Cancer No family hx of      Depression No family hx of      Anesthesia Reaction No family hx of      Asthma No family hx of      Osteoporosis No family hx of      Genetic Disorder No family hx of      Obesity No family hx of      Mental Illness No family hx of      Substance Abuse No family hx of      Glaucoma No family hx of      Social History     Tobacco Use     Smoking status: Current Some Day Smoker     Packs/day: 0.25     Years: 3.00     Pack years: 0.75     Types: Cigarettes     Last attempt to quit: 7/28/2018     Years  "since quittin.7     Smokeless tobacco: Never Used     Tobacco comment: I use an e cig every now and than   Substance Use Topics     Alcohol use: No     Comment: quit      Current Facility-Administered Medications   Medication     HYDROcodone-acetaminophen (NORCO) 5-325 MG per tablet 1 tablet     ondansetron (ZOFRAN) injection 4 mg     ondansetron (ZOFRAN-ODT) ODT tab 4 mg     Current Outpatient Medications   Medication     acetaminophen (TYLENOL) 32 mg/mL liquid     albuterol (PROAIR HFA/PROVENTIL HFA/VENTOLIN HFA) 108 (90 Base) MCG/ACT Inhaler     amphetamine-dextroamphetamine (ADDERALL) 20 MG tablet     B-D TB SYRINGE 27G X 1/2\" 1 ML MISC     blood glucose (NO BRAND SPECIFIED) lancets standard     blood glucose (NO BRAND SPECIFIED) test strip     blood glucose monitoring (NO BRAND SPECIFIED) meter device kit     carvedilol (COREG) 6.25 MG tablet     [facility]NA[facility] PATIENT     lidocaine (LIDODERM) 5 % Patch     LORazepam (ATIVAN) 1 MG tablet     naloxone (NARCAN) 4 MG/0.1ML nasal spray     Needle, Disp, (BD DISP NEEDLES) 27G X 1/2\" MISC     ondansetron (ZOFRAN-ODT) 8 MG ODT tab     order for DME     order for DME     oxyCODONE (ROXICODONE) 5 MG/5ML solution     polyethylene glycol (MIRALAX/GLYCOLAX) powder     sodium chloride 0.9% infusion     vitamin B-12 (CYANOCOBALAMIN) 1000 MCG/ML injection     vitamin D2 (ERGOCALCIFEROL) 13613 units (1250 mcg) capsule     Allergies   Allergen Reactions     Bactrim [Sulfamethoxazole W/Trimethoprim] Rash     Penicillins Anaphylaxis     Please see Antimicrobial Management Team allergy assessment note 10/10/2018. Patient reported tolerating amoxicillin.     Doxycycline Rash     Vancomycin Rash     Rash after receiving vancomycin 3/28/16 (red man's?). Tolerated with slower infusion and diphenhydramine premed.      Review of Systems   Constitutional: Positive for fever.   HENT: Positive for sore throat and tinnitus.    Gastrointestinal: Positive for nausea " and vomiting.   Neurological: Positive for tremors and headaches.   All other systems reviewed and are negative.    Physical Exam   BP: (!) 145/92  Pulse: 66  Heart Rate: 63  Temp: 98.5  F (36.9  C)  Resp: 18  SpO2: 99 %    Physical Exam   Constitutional: He is oriented to person, place, and time. He appears well-developed and well-nourished. No distress.   HENT:   Head: Normocephalic and atraumatic.   Mouth/Throat: No oropharyngeal exudate.   Eyes: Pupils are equal, round, and reactive to light. Right eye exhibits no discharge. Left eye exhibits no discharge. No scleral icterus.   Neck: Normal range of motion. Neck supple.   Cardiovascular: Normal rate, regular rhythm, normal heart sounds and intact distal pulses. Exam reveals no gallop and no friction rub.   No murmur heard.  Pulmonary/Chest: Effort normal and breath sounds normal. No respiratory distress. He has no wheezes. He exhibits no tenderness.   Abdominal: Soft. Bowel sounds are normal. He exhibits no distension. There is no tenderness.   Musculoskeletal: Normal range of motion. He exhibits no edema, tenderness or deformity.        Arms:  Neurological: He is alert and oriented to person, place, and time. No cranial nerve deficit.   Skin: Skin is warm and dry. Rash (Few tiny, raised itchy bumps on arms, legs and scalp) noted. He is not diaphoretic. No erythema. No pallor.   Psychiatric: He has a normal mood and affect.   Nursing note and vitals reviewed.      ED Course   10:02 PM  The patient was seen and examined by Dr. Murray in Room 11.       Procedures             Critical Care time:  none             Labs Ordered and Resulted from Time of ED Arrival Up to the Time of Departure from the ED   CBC WITH PLATELETS DIFFERENTIAL - Abnormal; Notable for the following components:       Result Value    RBC Count 4.02 (*)     Hemoglobin 12.4 (*)     Hematocrit 38.8 (*)     All other components within normal limits   COMPREHENSIVE METABOLIC PANEL - Abnormal;  Notable for the following components:    Potassium 3.3 (*)     All other components within normal limits   UA MACROSCOPIC WITH REFLEX TO MICRO AND CULTURE - Abnormal; Notable for the following components:    Blood Urine Trace (*)     RBC Urine 3 (*)     All other components within normal limits   LACTIC ACID WHOLE BLOOD   BLOOD CULTURE   BLOOD CULTURE          Assessments & Plan (with Medical Decision Making)   Is a 48-year-old male with a complex history including past gastric bypass as well as PICC line for TPN who presents with positive blood cultures and feeling generally unwell.  This is been present for the past several weeks and patient had blood cultures drawn several days ago which were positive for gram positive cocci.  He has had similar occurrences in the past.  Also had subjective fevers.  On exam patient is well-appearing with no drainage or redness from PICC line site.  He does describe rash which does appear to be minimal in nature.  Lactic acid is not elevated.  Patient does not appear to be septic.  Repeat blood cultures were obtained.  Patient was started on vancomycin as well as ondansetron and hydrocodone-acetaminophen.  We will admit for further monitoring work-up and treatment.    I have reviewed the nursing notes.    I have reviewed the findings, diagnosis, plan and need for follow up with the patient.     Medication List      There are no discharge medications for this visit.       Final diagnoses:   None   IGuy, am serving as a trained medical scribe to document services personally performed by Lisandro Murray DO, based on the provider's statements to me.   Lisandro NJ DO, was physically present and have reviewed and verified the accuracy of this note documented by [address].    5/12/2019   [facility], EMERGENCY DEPARTMENT     Lisandro Murray DO  05/13/19 0029

## 2019-05-13 NOTE — ED NOTES
[facility], [place]   ED Nurse to Floor Handoff     Parker Acevedo . is a 48 year old male who speaks English and lives with a spouse,  in a home  They arrived in the ED by car from home    ED Chief Complaint: Blood Infection    ED Dx;   Final diagnoses:   Bacteremia         Needed?: No    Allergies:   Allergies   Allergen Reactions     Bactrim [Sulfamethoxazole W/Trimethoprim] Rash     Penicillins Anaphylaxis     Please see Antimicrobial Management Team allergy assessment note 10/10/2018. Patient reported tolerating amoxicillin.     Doxycycline Rash     Vancomycin Rash     Rash after receiving vancomycin 3/28/16 (red man's?). Tolerated with slower infusion and diphenhydramine premed.   .  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)      Chronic abdominal pain      Complication of anesthesia      Difficulty swallowing      Gastric ulcer, unspecified as acute or chronic, without mention of hemorrhage, perforation, or obstruction      Gastro-oesophageal reflux disease      Generalized weakness 1/30/2018     Head injury      Hiatal hernia      Other bladder disorder      Other chronic pain      PONV (postoperative nausea and vomiting)      Severe malnutrition (H)     TPN     Short gut syndrome      Tobacco abuse       Baseline Mental status: WDL  Current Mental Status changes: at basesline    Infection present or suspected this encounter: Cultures pending  Sepsis suspected: Yes  Isolation type: No active isolations     Activity level - Baseline/Home:  Independent  Activity Level - Current:   Independent    Bariatric equipment needed?: No    In the ED these meds were given:   Medications   ondansetron (ZOFRAN-ODT) ODT tab 4 mg (has no  administration in time range)   HYDROcodone-acetaminophen (NORCO) 5-325 MG per tablet 1 tablet (has no administration in time range)   HYDROcodone-acetaminophen 7.5-325 MG/15ML solution 10 mL (has no administration in time range)   diphenhydrAMINE (BENADRYL) capsule 25 mg (has no administration in time range)   diphenhydrAMINE (BENADRYL) capsule 25 mg (has no administration in time range)   vancomycin (VANCOCIN) 2,000 mg in sodium chloride 0.9 % 500 mL intermittent infusion (has no administration in time range)     Followed by   vancomycin 1250 mg in 0.9% NaCl 250 mL intermittent infusion 1,250 mg (has no administration in time range)   diphenhydrAMINE (BENADRYL) injection 25 mg (has no administration in time range)   ondansetron (ZOFRAN) injection 4 mg (4 mg Intravenous Given 5/12/19 2220)       Drips running?  No    Home pump  No    Current LDAs  Peripheral IV 05/12/19 Right Upper forearm (Active)   Site Assessment WDL 5/12/2019  9:07 PM   Line Status Saline locked 5/12/2019  9:07 PM   Phlebitis Scale 0-->no symptoms 5/12/2019  9:07 PM   Infiltration Scale 0 5/12/2019  9:07 PM   Infiltration Site Treatment Method  None 5/12/2019  9:07 PM   Extravasation? No 5/12/2019  9:07 PM   Dressing Intervention New dressing  5/12/2019  9:07 PM   Number of days: 0       PICC Single Lumen 03/07/19 Left Basilic (Active)   Site Assessment Pink 5/12/2019  9:45 PM   External Cath Length (cm) 1 cm 5/12/2019  9:45 PM   Extremity Circumference (cm) 30 cm 5/12/2019  9:45 PM   Extravasation? No 5/12/2019  9:45 PM   Dressing Intervention Chlorhexidine patch;Transparent;New dressing;Securing device 5/12/2019  9:45 PM   Number of days: 66       Gastrostomy/Enterostomy Gastrostomy LUQ 1 18 fr (Active)   Number of days: 2502       Wound 06/05/18 Left;Posterior;Mid;Inner;Lower Arm Ulceration Bruised w/ an unopened sore (Active)   Number of days: 341       Incision/Surgical Site 02/20/15 Bilateral Neck (Active)   Number of days: 1542        Incision/Surgical Site 02/15/17 Neck (Active)   Number of days: 816       Incision/Surgical Site 12/19/17 Right Chest (Active)   Number of days: 509       Labs results:   Labs Ordered and Resulted from Time of ED Arrival Up to the Time of Departure from the ED   CBC WITH PLATELETS DIFFERENTIAL - Abnormal; Notable for the following components:       Result Value    RBC Count 4.02 (*)     Hemoglobin 12.4 (*)     Hematocrit 38.8 (*)     All other components within normal limits   COMPREHENSIVE METABOLIC PANEL - Abnormal; Notable for the following components:    Potassium 3.3 (*)     All other components within normal limits   UA MACROSCOPIC WITH REFLEX TO MICRO AND CULTURE - Abnormal; Notable for the following components:    Blood Urine Trace (*)     RBC Urine 3 (*)     All other components within normal limits   LACTIC ACID WHOLE BLOOD   BLOOD CULTURE   BLOOD CULTURE       Imaging Studies:   Recent Results (from the past 24 hour(s))   XR Chest 2 Views    Narrative    Exam:  Chest X-ray 5/12/2019 8:59 PM    History: bacteremia    Comparison: X-ray 1/8/2019    Findings: PA and lateral views of the chest. Left upper extremity PICC  with the tip projecting over the low SVC. The trachea is midline. The  cardiomediastinal silhouette is within normal limits. No pleural  effusion. The pulmonary vasculature is distinct. Subtle opacities over  the medial right lower lung. No pneumothorax. Radiodensities over the  mid upper abdomen are external to the patient. Percutaneous  gastrostomy tube. No acute osseous abnormality. Multilevel  degenerative changes of the thoracic spine.      Impression    Impression: Subtle opacities over the medial right lower lung,  subsegmental atelectasis versus infection.    I have personally reviewed the examination and initial interpretation  and I agree with the findings.    KENNETH MINOR MD       Recent vital signs:   BP (!) 132/96   Pulse 81   Temp 98.9  F (37.2  C) (Oral)   Resp 15   SpO2  96%     Maris Coma Scale Score: 15 (05/12/19 2003)       Cardiac Rhythm: Normal Sinus  Pt needs tele? No  Skin/wound Issues: None    Code Status: Full Code    Pain control: good    Nausea control: good    Abnormal labs/tests/findings requiring intervention:     Family present during ED course? Yes   Family Comments/Social Situation comments: Wife at bedside    Tasks needing completion: Still waiting on Vanco, Pt must have IV benedryl prior to administration, pt also cannot take pills - oral solutions only    Lynne Rodas, RN  [phone] [facility]

## 2019-05-13 NOTE — PROGRESS NOTES
This is a recent snapshot of the patient's [facility] medical record.  For current drug dose and complete information and questions, call [phone] or In Basket pool, [facility] ([id number])  CSN Number:  [id number]

## 2019-05-13 NOTE — PLAN OF CARE
No fevers this shift.   C/o intermittent nausea. Treated with ODT Zofran with relief.  Tolerated a mall meal and taking PO fluids.  Asked for Magic Mouthwash to relieve gastric pain from 'ulcer'. Worked well, per pt.  K+ 3.3. Replaced with KCL 20 meq x 2.  Wife, Rose at bedside and supportive. She is very helpful and has done PICC line cares in the past.  Plan: PPN to start tonight via peripheral IV.  Vancomycin given via PICC line.

## 2019-05-14 ENCOUNTER — TELEPHONE (OUTPATIENT)
Dept: PALLIATIVE MEDICINE | Facility: CLINIC | Age: 49
End: 2019-05-14

## 2019-05-14 ENCOUNTER — APPOINTMENT (OUTPATIENT)
Dept: CARDIOLOGY | Facility: CLINIC | Age: 49
DRG: 315 | End: 2019-05-14
Payer: COMMERCIAL

## 2019-05-14 DIAGNOSIS — R10.9 CHRONIC ABDOMINAL PAIN: ICD-10-CM

## 2019-05-14 DIAGNOSIS — G89.29 CHRONIC ABDOMINAL PAIN: ICD-10-CM

## 2019-05-14 DIAGNOSIS — Z79.891 ENCOUNTER FOR LONG-TERM OPIATE ANALGESIC USE: ICD-10-CM

## 2019-05-14 LAB
ALBUMIN SERPL-MCNC: 3.2 G/DL (ref 3.4–5)
ALP SERPL-CCNC: 82 U/L (ref 40–150)
ALT SERPL W P-5'-P-CCNC: 23 U/L (ref 0–70)
ANION GAP SERPL CALCULATED.3IONS-SCNC: 6 MMOL/L (ref 3–14)
AST SERPL W P-5'-P-CCNC: 15 U/L (ref 0–45)
BILIRUB DIRECT SERPL-MCNC: 0.1 MG/DL (ref 0–0.2)
BILIRUB SERPL-MCNC: 0.5 MG/DL (ref 0.2–1.3)
BUN SERPL-MCNC: 9 MG/DL (ref 7–30)
CALCIUM SERPL-MCNC: 8.2 MG/DL (ref 8.5–10.1)
CHLORIDE SERPL-SCNC: 108 MMOL/L (ref 94–109)
CO2 SERPL-SCNC: 28 MMOL/L (ref 20–32)
CREAT SERPL-MCNC: 0.67 MG/DL (ref 0.66–1.25)
CRP SERPL-MCNC: 5.7 MG/L (ref 0–8)
ERYTHROCYTE [SEDIMENTATION RATE] IN BLOOD BY WESTERGREN METHOD: 13 MM/H (ref 0–15)
GFR SERPL CREATININE-BSD FRML MDRD: >90 ML/MIN/{1.73_M2}
GLUCOSE BLDC GLUCOMTR-MCNC: 115 MG/DL (ref 70–99)
GLUCOSE BLDC GLUCOMTR-MCNC: 90 MG/DL (ref 70–99)
GLUCOSE SERPL-MCNC: 96 MG/DL (ref 70–99)
INR PPP: 1.17 (ref 0.86–1.14)
INTERPRETATION ECG - MUSE: NORMAL
MAGNESIUM SERPL-MCNC: 2 MG/DL (ref 1.6–2.3)
PHOSPHATE SERPL-MCNC: 4.4 MG/DL (ref 2.5–4.5)
POTASSIUM SERPL-SCNC: 3.5 MMOL/L (ref 3.4–5.3)
PREALB SERPL IA-MCNC: 17 MG/DL (ref 15–45)
PROT SERPL-MCNC: 6.1 G/DL (ref 6.8–8.8)
SODIUM SERPL-SCNC: 142 MMOL/L (ref 133–144)
VANCOMYCIN SERPL-MCNC: 13.3 MG/L

## 2019-05-14 PROCEDURE — 99233 SBSQ HOSP IP/OBS HIGH 50: CPT | Mod: GC | Performed by: INTERNAL MEDICINE

## 2019-05-14 PROCEDURE — 85652 RBC SED RATE AUTOMATED: CPT | Performed by: STUDENT IN AN ORGANIZED HEALTH CARE EDUCATION/TRAINING PROGRAM

## 2019-05-14 PROCEDURE — 25000128 H RX IP 250 OP 636: Performed by: EMERGENCY MEDICINE

## 2019-05-14 PROCEDURE — 25000132 ZZH RX MED GY IP 250 OP 250 PS 637

## 2019-05-14 PROCEDURE — 25800030 ZZH RX IP 258 OP 636: Performed by: EMERGENCY MEDICINE

## 2019-05-14 PROCEDURE — 40000802 ZZH SITE CHECK

## 2019-05-14 PROCEDURE — 83735 ASSAY OF MAGNESIUM: CPT | Performed by: ORTHOPAEDIC SURGERY

## 2019-05-14 PROCEDURE — 25800025 ZZH RX 258

## 2019-05-14 PROCEDURE — 87040 BLOOD CULTURE FOR BACTERIA: CPT | Performed by: STUDENT IN AN ORGANIZED HEALTH CARE EDUCATION/TRAINING PROGRAM

## 2019-05-14 PROCEDURE — 80053 COMPREHEN METABOLIC PANEL: CPT | Performed by: ORTHOPAEDIC SURGERY

## 2019-05-14 PROCEDURE — 25000131 ZZH RX MED GY IP 250 OP 636 PS 637: Performed by: STUDENT IN AN ORGANIZED HEALTH CARE EDUCATION/TRAINING PROGRAM

## 2019-05-14 PROCEDURE — 82248 BILIRUBIN DIRECT: CPT | Performed by: ORTHOPAEDIC SURGERY

## 2019-05-14 PROCEDURE — 36592 COLLECT BLOOD FROM PICC: CPT | Performed by: ORTHOPAEDIC SURGERY

## 2019-05-14 PROCEDURE — 25000132 ZZH RX MED GY IP 250 OP 250 PS 637: Performed by: INTERNAL MEDICINE

## 2019-05-14 PROCEDURE — 25000125 ZZHC RX 250: Performed by: ORTHOPAEDIC SURGERY

## 2019-05-14 PROCEDURE — 85610 PROTHROMBIN TIME: CPT | Performed by: ORTHOPAEDIC SURGERY

## 2019-05-14 PROCEDURE — 00000146 ZZHCL STATISTIC GLUCOSE BY METER IP

## 2019-05-14 PROCEDURE — 86140 C-REACTIVE PROTEIN: CPT | Performed by: ORTHOPAEDIC SURGERY

## 2019-05-14 PROCEDURE — 80202 ASSAY OF VANCOMYCIN: CPT | Performed by: ORTHOPAEDIC SURGERY

## 2019-05-14 PROCEDURE — 25000125 ZZHC RX 250: Performed by: INTERNAL MEDICINE

## 2019-05-14 PROCEDURE — 12000012 ZZH R&B MS OVERFLOW UMMC

## 2019-05-14 PROCEDURE — 36415 COLL VENOUS BLD VENIPUNCTURE: CPT | Performed by: STUDENT IN AN ORGANIZED HEALTH CARE EDUCATION/TRAINING PROGRAM

## 2019-05-14 PROCEDURE — 87070 CULTURE OTHR SPECIMN AEROBIC: CPT | Performed by: STUDENT IN AN ORGANIZED HEALTH CARE EDUCATION/TRAINING PROGRAM

## 2019-05-14 PROCEDURE — 84100 ASSAY OF PHOSPHORUS: CPT | Performed by: ORTHOPAEDIC SURGERY

## 2019-05-14 PROCEDURE — 36592 COLLECT BLOOD FROM PICC: CPT | Performed by: STUDENT IN AN ORGANIZED HEALTH CARE EDUCATION/TRAINING PROGRAM

## 2019-05-14 PROCEDURE — 84134 ASSAY OF PREALBUMIN: CPT | Performed by: ORTHOPAEDIC SURGERY

## 2019-05-14 PROCEDURE — 93306 TTE W/DOPPLER COMPLETE: CPT | Mod: 26 | Performed by: INTERNAL MEDICINE

## 2019-05-14 PROCEDURE — 93306 TTE W/DOPPLER COMPLETE: CPT

## 2019-05-14 PROCEDURE — 25000132 ZZH RX MED GY IP 250 OP 250 PS 637: Performed by: STUDENT IN AN ORGANIZED HEALTH CARE EDUCATION/TRAINING PROGRAM

## 2019-05-14 RX ORDER — OXYCODONE HCL 5 MG/5 ML
10-15 SOLUTION, ORAL ORAL EVERY 4 HOURS PRN
Qty: 1800 ML | Refills: 0 | Status: CANCELLED | OUTPATIENT
Start: 2019-05-14

## 2019-05-14 RX ORDER — DEXTROSE MONOHYDRATE 100 MG/ML
INJECTION, SOLUTION INTRAVENOUS
Status: COMPLETED
Start: 2019-05-14 | End: 2019-05-14

## 2019-05-14 RX ORDER — LEUCINE, PHENYLALANINE, LYSINE, METHIONINE, ISOLEUCINE, VALINE, HISTIDINE, THREONINE, TRYPTOPHAN, ALANINE, GLYCINE, ARGININE, PROLINE, SERINE, TYROSINE, DEXTROSE 365; 280; 290; 200; 300; 290; 240; 210; 90; 1035; 515; 575; 340; 250; 20; 15 MG/100ML; MG/100ML; MG/100ML; MG/100ML; MG/100ML; MG/100ML; MG/100ML; MG/100ML; MG/100ML; MG/100ML; MG/100ML; MG/100ML; MG/100ML; MG/100ML; MG/100ML; G/100ML
INJECTION INTRAVENOUS
Status: DISCONTINUED | OUTPATIENT
Start: 2019-05-14 | End: 2019-05-14

## 2019-05-14 RX ORDER — LORAZEPAM 1 MG/1
1 TABLET ORAL
Status: DISCONTINUED | OUTPATIENT
Start: 2019-05-14 | End: 2019-05-16 | Stop reason: HOSPADM

## 2019-05-14 RX ADMIN — ASCORBIC ACID, VITAMIN A PALMITATE, CHOLECALCIFEROL, THIAMINE HYDROCHLORIDE, RIBOFLAVIN-5 PHOSPHATE SODIUM, PYRIDOXINE HYDROCHLORIDE, NIACINAMIDE, DEXPANTHENOL, ALPHA-TOCOPHEROL ACETATE, VITAMIN K1, FOLIC ACID, BIOTIN, CYANOCOBALAMIN: 200; 3300; 200; 6; 3.6; 6; 40; 15; 10; 150; 600; 60; 5 INJECTION, SOLUTION INTRAVENOUS at 22:10

## 2019-05-14 RX ADMIN — DIPHENHYDRAMINE HYDROCHLORIDE AND LIDOCAINE HYDROCHLORIDE AND ALUMINUM HYDROXIDE AND MAGNESIUM HYDRO 10 ML: KIT at 23:55

## 2019-05-14 RX ADMIN — DIPHENHYDRAMINE HYDROCHLORIDE 25 MG: 25 SOLUTION ORAL at 01:22

## 2019-05-14 RX ADMIN — OXYCODONE HYDROCHLORIDE 15 MG: 5 SOLUTION ORAL at 07:58

## 2019-05-14 RX ADMIN — CARVEDILOL 6.25 MG: 6.25 TABLET, FILM COATED ORAL at 07:59

## 2019-05-14 RX ADMIN — ONDANSETRON 4 MG: 4 TABLET, ORALLY DISINTEGRATING ORAL at 22:04

## 2019-05-14 RX ADMIN — OXYCODONE HYDROCHLORIDE 15 MG: 5 SOLUTION ORAL at 22:04

## 2019-05-14 RX ADMIN — VANCOMYCIN HYDROCHLORIDE 1250 MG: 10 INJECTION, POWDER, LYOPHILIZED, FOR SOLUTION INTRAVENOUS at 02:06

## 2019-05-14 RX ADMIN — OXYCODONE HYDROCHLORIDE 15 MG: 5 SOLUTION ORAL at 15:57

## 2019-05-14 RX ADMIN — ONDANSETRON 4 MG: 4 TABLET, ORALLY DISINTEGRATING ORAL at 07:58

## 2019-05-14 RX ADMIN — DIPHENHYDRAMINE HYDROCHLORIDE 25 MG: 25 SOLUTION ORAL at 18:14

## 2019-05-14 RX ADMIN — LORAZEPAM 1 MG: 1 TABLET ORAL at 22:04

## 2019-05-14 RX ADMIN — ASCORBIC ACID, VITAMIN A PALMITATE, CHOLECALCIFEROL, THIAMINE HYDROCHLORIDE, RIBOFLAVIN-5 PHOSPHATE SODIUM, PYRIDOXINE HYDROCHLORIDE, NIACINAMIDE, DEXPANTHENOL, ALPHA-TOCOPHEROL ACETATE, VITAMIN K1, FOLIC ACID, BIOTIN, CYANOCOBALAMIN: 200; 3300; 200; 6; 3.6; 6; 40; 15; 10; 150; 600; 60; 5 INJECTION, SOLUTION INTRAVENOUS at 13:07

## 2019-05-14 RX ADMIN — DEXTROSE MONOHYDRATE: 100 INJECTION, SOLUTION INTRAVENOUS at 12:07

## 2019-05-14 RX ADMIN — DIPHENHYDRAMINE HYDROCHLORIDE 25 MG: 25 SOLUTION ORAL at 10:04

## 2019-05-14 RX ADMIN — DIPHENHYDRAMINE HYDROCHLORIDE AND LIDOCAINE HYDROCHLORIDE AND ALUMINUM HYDROXIDE AND MAGNESIUM HYDRO 10 ML: KIT at 12:03

## 2019-05-14 RX ADMIN — CARVEDILOL 6.25 MG: 6.25 TABLET, FILM COATED ORAL at 18:14

## 2019-05-14 RX ADMIN — OXYCODONE HYDROCHLORIDE 15 MG: 5 SOLUTION ORAL at 11:58

## 2019-05-14 RX ADMIN — VANCOMYCIN HYDROCHLORIDE 1250 MG: 10 INJECTION, POWDER, LYOPHILIZED, FOR SOLUTION INTRAVENOUS at 18:14

## 2019-05-14 RX ADMIN — ONDANSETRON 4 MG: 4 TABLET, ORALLY DISINTEGRATING ORAL at 15:57

## 2019-05-14 RX ADMIN — OXYCODONE HYDROCHLORIDE 15 MG: 5 SOLUTION ORAL at 02:07

## 2019-05-14 RX ADMIN — VANCOMYCIN HYDROCHLORIDE 1250 MG: 10 INJECTION, POWDER, LYOPHILIZED, FOR SOLUTION INTRAVENOUS at 10:01

## 2019-05-14 RX ADMIN — DEXTROAMPHETAMINE SACCHARATE, AMPHETAMINE ASPARTATE, DEXTROAMPHETAMINE SULFATE AND AMPHETAMINE SULFATE 20 MG: 2.5; 2.5; 2.5; 2.5 TABLET ORAL at 07:59

## 2019-05-14 ASSESSMENT — ACTIVITIES OF DAILY LIVING (ADL)
ADLS_ACUITY_SCORE: 12

## 2019-05-14 NOTE — PLAN OF CARE
Vital signs:  Temp: 97.7  F (36.5  C) Temp src: Oral BP: 122/90 Heart Rate: 67 Resp: 18 SpO2: 97 % O2 Device: None (Room air)     A&Ox4. Pt c/o chronic back pain & abdominal discomfort. Oxycodone prn 15 mg with relief. Benadryl prn given prior Vancomycin infusion. PPN infusing at 105 ml/hr & Lipids at 20.8 ml/hr. Regular diet. , 96. Up ad vitor. Continue POC.

## 2019-05-14 NOTE — CONSULTS
[facility] ID SERVICE: NEW CONSULTATION     Patient:  Parker Acevedo Sr., Date of birth 1970, Medical record number [id number]  Date of Visit:  May 14, 2019         Assessment and Recommendations:   Assessment/Discussion:  47yo M with pmh notable for gastric bypass with complex history of complications including short gut syndrome and on chronic TPN via PICC line, HFrEF 2/2 NICM who presented to the ED after he was found to have a positive blood culture (from a PICC line blood draw). Blood culture positive for Rothia mucilaginosa and ID is consulted for assistance with management.     Patient's HPI is pertinent for chronic intermittent fevers of unknown etilogy. DDx includes indolent infection such as endocarditis vs. inflammatory in the setting of active chronicTPN. Of note, patient last blood stream infection was on 1/2019 with candida albicans and he was treated with fluconazole for 10days. Patient's PICC line was placed on 4/10 and no blood cultures were drawn from it until 5/10. Rothia mucilaginosa is a slow growing bacteria typically seen in oral and respiratory deepa.  Patient is edentulous and have not had any recent oral procedures or pulmonary infections which is reassuring. However, given his history of intermittent fevers and a positive blood culture, it will be prudent to continue antibiotics and further evaluate for possible infection including endocarditis.     Problem List:  - PICC associated bacterimia 5/10/19 - Rothia mucilaginosa    - PICC site - redness x1week   - Intermittent fevers  - Intermittent rashes    Recommendations:  - Obtain CRP, ESR and TTE/ RONEL   - Continue Vancomycin IV   - Remove PICC line, obtain cultures from tip of PICC line, and give a 48hr line holiday before replacement.  - Obtain new blood cultures once PICC line is removed    Thank you for this consult. [facility] ID team will continue to follow this patient. Please feel free to call with any questions.      Discussed with Dr Georgi Sheehan MD, PhD  Internal Medicine PGY1  Pager: [phone]    Attestation:  This patient has been seen and evaluated by me.  I discussed this   patient with resident Dr Sheehan  and agree with the findings and plan in this note. I also personally edited this note to reflect my findings. I have reviewed today's vital signs, medications, labs and imaging.    Quinn Laureano MD,M.Med.Sc.  Attending, Infectious Diseases  Pager: [phone]           History of Present Illness:   47yo M with pmh notable for gastric bypass  (2004) (with subsequent revision to treat marginal ulceration then later required intervention with GJ resection c/b chronic malnutrition --> shortgut syndrome on chronic TPN via PICC line since 2012), HFrEF 2/2 NICM who presented to the ED after he was found to have a positive blood culture (from a PICC line blood draw).    Patient reports that he has had intermittent fevers for years now. He checks his temperature daily at home and on average has a >/=100.4 F fever every 4-5days. He typically knows when he is febrile as he would also have some chills with it. His fevers lasts for minutes-hrs before subsiding. Since this is a frequent occurrence for him now he only goes to see a physician if he has a >/=101 fever. On 5/10 he had a fever of 101.6 F, went to see his doctor and blood cultures were drawn. On 5/12 he received a call prompting him to go the the emergency room because his blood cultures were positive for GPC and he was subsequently admitted. His review of system was positive for intermittent skin rashes, fevers and chills but otherwise benign (no cough, no diarrhea, no urinary symptoms). He has also had some mild photosensitivities and intermittent headaches worse in the past 2 weeks.     On admission he was non toxic appearing, hemodynamically stable without leukocytois. New blood cultures from the PICC line and peripheral venous  sites were obtained (on 5/12) and he was started on Vancomycin. Since admission, speciation of the 5/10 blood culture was positive for one organism; Rothia mucilaginosa. ID is consulted to assist with management of this infection. Patient has remained afebrile and blood cultures obtained from 5/12 remains NGTD.    Of note, patient last blood stream infection was on 1/2019 with candida albicans and he was treated with fluconazole for 10days.         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)      Chronic abdominal pain      Complication of anesthesia      Difficulty swallowing      Gastric ulcer, unspecified as acute or chronic, without mention of hemorrhage, perforation, or obstruction      Gastro-oesophageal reflux disease      Generalized weakness 1/30/2018     Head injury      Hiatal hernia      Other bladder disorder      Other chronic pain      PONV (postoperative nausea and vomiting)      Severe malnutrition (H)     TPN     Short gut syndrome      Tobacco abuse          Allergies:      Allergies   Allergen Reactions     Bactrim [Sulfamethoxazole W/Trimethoprim] Rash     Penicillins Anaphylaxis     Please see Antimicrobial Management Team allergy assessment note 10/10/2018. Patient reported tolerating amoxicillin.     Doxycycline Rash     Vancomycin Rash     Rash after receiving vancomycin 3/28/16 (red man's?). Tolerated with slower infusion and diphenhydramine premed.           Recent Antimicrobials::   Vanco 5/13 -->        Family History:     Family History   Problem Relation Age of Onset     Gastrointestinal Disease Mother         Crohns disease     Anxiety Disorder Mother      Thyroid Disease Mother          Grave's disease     Cancer Father         ear cancer-skin cancer/melanoma     Breast Cancer Maternal Grandmother      Macular Degeneration Maternal Grandfather      Anxiety Disorder Sister      Diabetes Maternal Uncle      Breast Cancer Other      Hypertension No family hx of      Hyperlipidemia No family hx of      Cerebrovascular Disease No family hx of      Prostate Cancer No family hx of      Depression No family hx of      Anesthesia Reaction No family hx of      Asthma No family hx of      Osteoporosis No family hx of      Genetic Disorder No family hx of      Obesity No family hx of      Mental Illness No family hx of      Substance Abuse No family hx of      Glaucoma No family hx of             Social History:     Social History     Socioeconomic History     Marital status:      Spouse name: Rose     Number of children: 1     Years of education: Not on file     Highest education level: Not on file   Occupational History     Not on file   Social Needs     Financial resource strain: Not on file     Food insecurity:     Worry: Not on file     Inability: Not on file     Transportation needs:     Medical: Not on file     Non-medical: Not on file   Tobacco Use     Smoking status: Current Some Day Smoker     Packs/day: 0.25     Years: 3.00     Pack years: 0.75     Types: Cigarettes     Last attempt to quit: 2018     Years since quittin.7     Smokeless tobacco: Never Used     Tobacco comment: I use an e cig every now and than   Substance and Sexual Activity     Alcohol use: No     Comment: quit      Drug use: No     Sexual activity: Yes     Partners: Female     Birth control/protection: None     Comment: no protection   Lifestyle     Physical activity:     Days per week: Not on file     Minutes per session: Not on file     Stress: Not on file   Relationships     Social connections:     Talks on phone: Not on file     Gets together: Not on file     Attends Yazdanism service: Not on file      Active member of club or organization: Not on file     Attends meetings of clubs or organizations: Not on file     Relationship status: Not on file     Intimate partner violence:     Fear of current or ex partner: Not on file     Emotionally abused: Not on file     Physically abused: Not on file     Forced sexual activity: Not on file   Other Topics Concern     Parent/sibling w/ CABG, MI or angioplasty before 65F 55M? No   Social History Narrative     Not on file          Physical Exam:   Ranges forvital signs:  Temp:  [97.7  F (36.5  C)-98.5  F (36.9  C)] 98.2  F (36.8  C)  Pulse:  [54] 54  Heart Rate:  [54-86] 86  Resp:  [14-18] 18  BP: ()/(61-95) 93/61  SpO2:  [97 %-100 %] 98 %    Intake/Output Summary (Last 24 hours) at 5/14/2019 1234  Last data filed at 5/14/2019 0924  Gross per 24 hour   Intake 2393.8 ml   Output --   Net 2393.8 ml       Exam:  GENERAL:  well-developed, well-nourished, sitting in bed in no acute distress.   ENT:  Head is normocephalic, atraumatic. edentulous oropharynx , moist without exudates or ulcers.  EYES:  Eyes have anicteric sclerae.    LUNGS:  Clear to auscultation.  CARDIOVASCULAR:  Regular rate and rhythm with no murmurs, gallops or rubs.  ABDOMEN:  G tube site with clean bases and no discharge, Normal bowel sounds, soft, nontender.  EXT: Extremities warm and without edema, left arm mildly erythematous at site of picc line  SKIN:  No acute rashes.  Line is in place without any surrounding erythema.  NEUROLOGIC:  Grossly nonfocal.         Laboratory Data:     Creatinine   Date Value Ref Range Status   05/14/2019 0.67 0.66 - 1.25 mg/dL Final   05/13/2019 0.63 (L) 0.66 - 1.25 mg/dL Final   05/12/2019 0.68 0.66 - 1.25 mg/dL Final   05/10/2019 0.58 (L) 0.66 - 1.25 mg/dL Final   04/10/2019 0.61 (L) 0.66 - 1.25 mg/dL Final     WBC   Date Value Ref Range Status   05/13/2019 5.9 4.0 - 11.0 10e9/L Final   05/12/2019 7.8 4.0 - 11.0 10e9/L Final   05/10/2019 6.8 4.0 - 11.0 10e9/L Final    04/10/2019 7.2 4.0 - 11.0 10e9/L Final   03/05/2019 7.7 4.0 - 11.0 10e9/L Final     Hemoglobin   Date Value Ref Range Status   05/13/2019 11.1 (L) 13.3 - 17.7 g/dL Final     Platelet Count   Date Value Ref Range Status   05/13/2019 144 (L) 150 - 450 10e9/L Final     Lab Results   Component Value Date     05/14/2019    BUN 9 05/14/2019    CO2 28 05/14/2019          Pertinent Recent Microbiology Data:            Imaging:   Imaging:  No results found for this or any previous visit (from the past 24 hour(s)).

## 2019-05-14 NOTE — PLAN OF CARE
/88 (BP Location: Right arm)   Pulse 54   Temp 98.1  F (36.7  C) (Oral)   Resp 14   Wt 93.7 kg (206 lb 9.6 oz)   SpO2 100%   BMI 28.83 kg/m      5014-1248: Admitted 5/12 with bacteremia and positive blood cultures drawn from Left single lumen PICC 5/10, subsequent blood cultures have had no growth. Receives Vancomycin infusions every 8 hrs w/benadryl as premed. Clindamix PPN infusing at 105 ml/hr in R PIV; PIV replaced tonight. Pt insists on lipids infusing in PICC line. Pt requested a midline be placed. BG Q6H while on PPN;  & 87. Afebrile. Diastolic BP elevated; scheduled Coreg given. Bradycardia in mid 50s at times. Chronic back pain; on oxycodone 15 mg every 4 hrs. Abdominal pain from ulcers; Magic mouthwash available PRN; given x1. PEG tube for venting; managed by pt. Zofran ODT x1. Ativan 1mg PO order obtained for anxiety x1. Potassium recheck 3.8; per replacement protocol, pt to receive additional 20 mEq, but refused to take orally. Also, states he doesn't need it, with PPN having 30 mEq of potassium. Paged MD to see if we could lower the threshold for replacement. Plan to just hold potassium replacement for now. Voiding spontaneously. Did not report BM this evening. Continue to monitor and follow POC.

## 2019-05-14 NOTE — PROGRESS NOTES
CLINICAL NUTRITION SERVICES - BRIEF NOTE     Nutrition Prescription      Recommendations already ordered by Registered Dietitian (RD):  Change PPN hours from continuous to 120 ml/hr x 14 hours/day (8p-10a) per patient request.   This will provide 1072 kcal ( 12 kcal/kg), 71 grams protein (0.8g/kg), ~60-65% of assessed needs.        EVALUATION OF THE PROGRESS TOWARD GOALS   Diet: Regular  Nutrition Support: Clinimix PPN @ 105 ml/hr continuous      NEW FINDINGS   Per discussion with patient and RN, patient frustrated with line issues and prefers to have time off of his IVs.  He is requesting his PN to be running only during the evening hours, as his PN runs at home.  Discussed that this will limit the amount of nutrition we are able to give him.  Patient verbalized understanding of this but continues to request time off of PN.      Spoke with team, they are agreeable to PPN over shortened period of time.  Discussed with PharmD.     Monitoring/Evaluation  Progress toward goals will be monitored and evaluated per protocol.    Nicolette Esqueda MS, RD, LD  Pager [phone]

## 2019-05-14 NOTE — TELEPHONE ENCOUNTER
Please process refill of oxycodone 5 mg/5 mL    YADIRA LazarN, RN  Care Coordinator  [facility] [place]

## 2019-05-14 NOTE — PROGRESS NOTES
[facility], [place]    Progress Note - Dez 2 Service        Date of Admission:  5/12/2019    Assessment & Plan   Parker Acevedo Sr. is a 48 year old with PMH that is pertinent for gastric bypass (2004) with subsequent revision to treat marginal ulceration. This required further intervention with GJ resection c/w chronic malnutarion , short gut syndrome with TPN through PICC line HFrEF 2/2 NICM , who presented to the ED after he was found to have a positive blood culture from PICC line.    Today's changes  - Remove PICC and obtain culture from tip, 48h line holiday  - Apperciate ID recs for CRP, ESR, TTE    #Positive blood cultures with GPCs  #Fatigue  #Gastric bypass complicated by ulceration requiring multiple revisions  #Chronic malnutrution , TPN dependant throught PICC line   Patient is completely TPN dependant. He has a Gastrostomy tube for venting only. He had routine blood draw from PICC on 5/10 that are now growing GPCs. Negative for staph, strep, enterococcus. Unclear if true infection vs colonization, however given recent fever blood culture from PICC and peripheral site repeated. Original culture from PICC positive for Rothia mucilaginosa. ID consulted and recs appreciated.   - CRP, EST, TTE  - Continue vancomycin  - Remove PICC line and obtain culture from tip  - 48h line holiday  - Obtain cultures once PICC removed  - TPN through peripheral IV  - Avoid midline access    #Papular rash - resolved  Suspected folliculitis of the abdomen, thighs and back on initial exam. Mupirocin ordered, but not applied to areas. No current signs of papular rashes.     #CXR with RML infiltrate  Likely related to recent aspiration episode. No PNA specific Abx needed.    #Hypokalemia   Replacement protocol     #HFrEF with recovered EF 2/2 NICM   - BB: Carvedilol 6.25 mg BID  - ACE/ARB: on none   - Ald Ant: EF>35%  - Volume: euvolemic     - Chronic conditions   #Chronic Pain:  continue PTA oxycodone   #B12 deficiency: hold Q1 month b12 injection while inpatient   #VDD: continue PTA once a week ergocalciferol  #ADD: continue PTA adderal         Diet: Regular Diet Adult  parenteral nutrition - Clinimix E    Fluids: none  Lines: Right arm PIV  DVT Prophylaxis: ambulation  Sanchez Catheter: not present  Code Status: Full Code      Disposition Plan   Pending 48h line holiday and reinsertion of new PICC       The patient's care was discussed with the attending Dr. Edd Serna, DMD  [facility]  Oral and Maxillofacial Surgery PGY1  [phone]   ______________________________________________________________________    Interval History   No fever or chills overnight.     Physical Exam   Vital Signs: Temp: 98.2  F (36.8  C) Temp src: Oral BP: 93/61 Pulse: 54 Heart Rate: 86 Resp: 18 SpO2: 98 % O2 Device: None (Room air)    Weight: 206 lbs 9.6 oz  General: NAD, resting comfortably on exam, appears stated age,pleasant and cooperative, AAOx3.  HEENT: NC/AT, well injected Conjunctiva, anicteric sclera, EOMI; PERRL, MMM;   Neck: Trachea midline; supple, good tone; full ROM; negative for Lymphoadenopathy, and JVD  Chest: decreased air entry RLL/RML   CV: RRR; nl S1/S2, no murmurs  Abd: Soft, diffuse tender, G-port in place (+) BS;   Ext: Warm/well perfused; no Edema.  Skin: Clear; unbroken; no new rashes  Neuro: - MS: AAO x3 - no focal deficit     Data    5/10 culture from PICC growing Rothia mucilaginosa

## 2019-05-14 NOTE — PLAN OF CARE
"VSS with no fevers.  Chronic pain; Pain medication given q 4 hours.  Reflux- using Magic Mouthwash and getting temporary relief of gastric pain.  Activity; outside to smoke frequently.  No coughing; no SOB.  R/o infecton; PICC line removed and tip sent for culture.   IV access; per ID service, Pt. Needs a line holiday; pt. May have a midline if absolutely necessary. On Vancomycin q 8 hours.  Pt. Will discuss PPN and whether he can be off it temporarily.  Pt. Is very concerned about maintaining IV access and feels he will not tolerate Vanco and PPN without a midline. He has asked that we respect his wishes if he gets to the point where he refuses another PIV.  \" When I tell you I don't want to get poked again, I don't want you to try to talk me in to it.\"   PPN infusing now; Vanco due at 1800. Dietary hopes that we can run PPN and stop it for Vanco as needed every 8 hours; pt. May not agree.  Wife, Rose is at bedside and very supportive.  "

## 2019-05-14 NOTE — PHARMACY-VANCOMYCIN DOSING SERVICE
Pharmacy Vancomycin Note  Date of Service May 14, 2019  Patient's  1970   48 year old, male    Indication: Bacteremia   - 5/10 blood culture from PICC line = Rothia mucilaginosa (sensitivities pending)  -  blood culture x 2 pending but negative to date    Goal Trough Level: 15-20 mg/L  Day of Therapy: 2  Current Vancomycin regimen:  1250 mg IV q8h    Creatinine for last 3 days  2019:  9:11 PM Creatinine 0.68 mg/dL  2019:  6:51 AM Creatinine 0.63 mg/dL  2019:  6:20 AM Creatinine 0.67 mg/dL  - Current estimated CrCl = Estimated Creatinine Clearance: 161.1 mL/min (based on SCr of 0.67 mg/dL).      Vancomycin IV Administrations (past 72 hours)                   vancomycin (VANCOCIN) 2,000 mg in sodium chloride 0.9 % 500 mL intermittent infusion (mg) 2,000 mg New Bag 19 0056                Nephrotoxins and other renal medications (From now, onward)    Start     Dose/Rate Route Frequency Ordered Stop    19 0730  vancomycin 1250 mg in 0.9% NaCl 250 mL intermittent infusion 1,250 mg      1,250 mg  over 2 Hours Intravenous EVERY 8 HOURS 19 2252               Contrast Orders - past 72 hours (72h ago, onward)    None          Recent Vancomycin Levels (past 3 days)  2019:  9:28 AM Vancomycin Level 13.3 mg/L    Interpretation of levels and current regimen:  Trough level is below goal, however, expect will have some accumulation and patient has been therapeutic on this dose in the past. Patient clinically stable per chart review. Will continue current dose and recheck level tomorrow. If still remains on the low end will consider dose adjustment.     Has serum creatinine changed > 50% in last 72 hours: No  Urine output:  unable to determine  Renal Function: Stable    Plan:  1.  Continue Current Dose  2.  Pharmacy will check trough levels as appropriate in 1-3 Days.    3. Serum creatinine levels will be ordered daily for the first week of therapy and at least twice weekly for  subsequent weeks.      Teressa Taylor, PharmD, BCPS  Pager [phone]        .

## 2019-05-15 ENCOUNTER — APPOINTMENT (OUTPATIENT)
Dept: ULTRASOUND IMAGING | Facility: CLINIC | Age: 49
DRG: 315 | End: 2019-05-15
Payer: COMMERCIAL

## 2019-05-15 LAB
ANION GAP SERPL CALCULATED.3IONS-SCNC: 5 MMOL/L (ref 3–14)
BACTERIA SPEC CULT: ABNORMAL
BUN SERPL-MCNC: 12 MG/DL (ref 7–30)
CALCIUM SERPL-MCNC: 8.2 MG/DL (ref 8.5–10.1)
CHLORIDE SERPL-SCNC: 109 MMOL/L (ref 94–109)
CO2 SERPL-SCNC: 28 MMOL/L (ref 20–32)
CREAT SERPL-MCNC: 0.74 MG/DL (ref 0.66–1.25)
ERYTHROCYTE [DISTWIDTH] IN BLOOD BY AUTOMATED COUNT: 12.5 % (ref 10–15)
GFR SERPL CREATININE-BSD FRML MDRD: >90 ML/MIN/{1.73_M2}
GLUCOSE BLDC GLUCOMTR-MCNC: 120 MG/DL (ref 70–99)
GLUCOSE BLDC GLUCOMTR-MCNC: 95 MG/DL (ref 70–99)
GLUCOSE SERPL-MCNC: 84 MG/DL (ref 70–99)
HCT VFR BLD AUTO: 37.3 % (ref 40–53)
HGB BLD-MCNC: 11.7 G/DL (ref 13.3–17.7)
Lab: ABNORMAL
MAGNESIUM SERPL-MCNC: 2 MG/DL (ref 1.6–2.3)
MCH RBC QN AUTO: 30.8 PG (ref 26.5–33)
MCHC RBC AUTO-ENTMCNC: 31.4 G/DL (ref 31.5–36.5)
MCV RBC AUTO: 98 FL (ref 78–100)
PHOSPHATE SERPL-MCNC: 4.6 MG/DL (ref 2.5–4.5)
PLATELET # BLD AUTO: 158 10E9/L (ref 150–450)
POTASSIUM SERPL-SCNC: 3.4 MMOL/L (ref 3.4–5.3)
RADIOLOGIST FLAGS: ABNORMAL
RBC # BLD AUTO: 3.8 10E12/L (ref 4.4–5.9)
SODIUM SERPL-SCNC: 142 MMOL/L (ref 133–144)
SPECIMEN SOURCE: ABNORMAL
VANCOMYCIN SERPL-MCNC: 14.7 MG/L
WBC # BLD AUTO: 6.6 10E9/L (ref 4–11)

## 2019-05-15 PROCEDURE — 25000125 ZZHC RX 250: Performed by: INTERNAL MEDICINE

## 2019-05-15 PROCEDURE — 99233 SBSQ HOSP IP/OBS HIGH 50: CPT | Mod: GC | Performed by: INTERNAL MEDICINE

## 2019-05-15 PROCEDURE — 80048 BASIC METABOLIC PNL TOTAL CA: CPT | Performed by: ORTHOPAEDIC SURGERY

## 2019-05-15 PROCEDURE — 25000132 ZZH RX MED GY IP 250 OP 250 PS 637: Performed by: STUDENT IN AN ORGANIZED HEALTH CARE EDUCATION/TRAINING PROGRAM

## 2019-05-15 PROCEDURE — 25000131 ZZH RX MED GY IP 250 OP 636 PS 637: Performed by: STUDENT IN AN ORGANIZED HEALTH CARE EDUCATION/TRAINING PROGRAM

## 2019-05-15 PROCEDURE — 00000146 ZZHCL STATISTIC GLUCOSE BY METER IP

## 2019-05-15 PROCEDURE — 40000141 ZZH STATISTIC PERIPHERAL IV START W/O US GUIDANCE

## 2019-05-15 PROCEDURE — 25000132 ZZH RX MED GY IP 250 OP 250 PS 637

## 2019-05-15 PROCEDURE — 83735 ASSAY OF MAGNESIUM: CPT | Performed by: ORTHOPAEDIC SURGERY

## 2019-05-15 PROCEDURE — 12000012 ZZH R&B MS OVERFLOW UMMC

## 2019-05-15 PROCEDURE — 80202 ASSAY OF VANCOMYCIN: CPT | Performed by: ORTHOPAEDIC SURGERY

## 2019-05-15 PROCEDURE — 25000128 H RX IP 250 OP 636: Performed by: EMERGENCY MEDICINE

## 2019-05-15 PROCEDURE — 93971 EXTREMITY STUDY: CPT | Mod: LT

## 2019-05-15 PROCEDURE — 36415 COLL VENOUS BLD VENIPUNCTURE: CPT | Performed by: ORTHOPAEDIC SURGERY

## 2019-05-15 PROCEDURE — 25800030 ZZH RX IP 258 OP 636: Performed by: EMERGENCY MEDICINE

## 2019-05-15 PROCEDURE — 84100 ASSAY OF PHOSPHORUS: CPT | Performed by: ORTHOPAEDIC SURGERY

## 2019-05-15 PROCEDURE — 40000982 ZZH STATISTICAL HAIKU-CANTO PHOTO

## 2019-05-15 PROCEDURE — 40000556 ZZH STATISTIC PERIPHERAL IV START W US GUIDANCE

## 2019-05-15 PROCEDURE — 25000132 ZZH RX MED GY IP 250 OP 250 PS 637: Performed by: INTERNAL MEDICINE

## 2019-05-15 PROCEDURE — 85027 COMPLETE CBC AUTOMATED: CPT | Performed by: ORTHOPAEDIC SURGERY

## 2019-05-15 RX ORDER — HEPARIN SODIUM,PORCINE 10 UNIT/ML
2-5 VIAL (ML) INTRAVENOUS
Status: DISCONTINUED | OUTPATIENT
Start: 2019-05-15 | End: 2019-05-16 | Stop reason: HOSPADM

## 2019-05-15 RX ORDER — LIDOCAINE 40 MG/G
CREAM TOPICAL
Status: DISCONTINUED | OUTPATIENT
Start: 2019-05-15 | End: 2019-05-15

## 2019-05-15 RX ORDER — LIDOCAINE 40 MG/G
CREAM TOPICAL
Status: DISCONTINUED | OUTPATIENT
Start: 2019-05-15 | End: 2019-05-16 | Stop reason: HOSPADM

## 2019-05-15 RX ADMIN — OXYCODONE HYDROCHLORIDE 15 MG: 5 SOLUTION ORAL at 14:12

## 2019-05-15 RX ADMIN — OXYCODONE HYDROCHLORIDE 15 MG: 5 SOLUTION ORAL at 02:09

## 2019-05-15 RX ADMIN — OXYCODONE HYDROCHLORIDE 15 MG: 5 SOLUTION ORAL at 18:37

## 2019-05-15 RX ADMIN — OXYCODONE HYDROCHLORIDE 15 MG: 5 SOLUTION ORAL at 22:32

## 2019-05-15 RX ADMIN — ONDANSETRON 4 MG: 4 TABLET, ORALLY DISINTEGRATING ORAL at 22:32

## 2019-05-15 RX ADMIN — DEXTROAMPHETAMINE SACCHARATE, AMPHETAMINE ASPARTATE, DEXTROAMPHETAMINE SULFATE AND AMPHETAMINE SULFATE 20 MG: 2.5; 2.5; 2.5; 2.5 TABLET ORAL at 07:55

## 2019-05-15 RX ADMIN — DIPHENHYDRAMINE HYDROCHLORIDE AND LIDOCAINE HYDROCHLORIDE AND ALUMINUM HYDROXIDE AND MAGNESIUM HYDRO 10 ML: KIT at 10:09

## 2019-05-15 RX ADMIN — CARVEDILOL 6.25 MG: 6.25 TABLET, FILM COATED ORAL at 18:37

## 2019-05-15 RX ADMIN — DIPHENHYDRAMINE HYDROCHLORIDE 25 MG: 25 SOLUTION ORAL at 14:16

## 2019-05-15 RX ADMIN — OXYCODONE HYDROCHLORIDE 15 MG: 5 SOLUTION ORAL at 06:23

## 2019-05-15 RX ADMIN — CARVEDILOL 6.25 MG: 6.25 TABLET, FILM COATED ORAL at 07:52

## 2019-05-15 RX ADMIN — DIPHENHYDRAMINE HYDROCHLORIDE 25 MG: 25 SOLUTION ORAL at 22:32

## 2019-05-15 RX ADMIN — OXYCODONE HYDROCHLORIDE 15 MG: 5 SOLUTION ORAL at 10:03

## 2019-05-15 RX ADMIN — VANCOMYCIN HYDROCHLORIDE 1250 MG: 10 INJECTION, POWDER, LYOPHILIZED, FOR SOLUTION INTRAVENOUS at 14:12

## 2019-05-15 RX ADMIN — VANCOMYCIN HYDROCHLORIDE 1250 MG: 10 INJECTION, POWDER, LYOPHILIZED, FOR SOLUTION INTRAVENOUS at 22:35

## 2019-05-15 RX ADMIN — ONDANSETRON 4 MG: 4 TABLET, ORALLY DISINTEGRATING ORAL at 14:12

## 2019-05-15 RX ADMIN — VANCOMYCIN HYDROCHLORIDE 1250 MG: 10 INJECTION, POWDER, LYOPHILIZED, FOR SOLUTION INTRAVENOUS at 02:09

## 2019-05-15 RX ADMIN — ASCORBIC ACID, VITAMIN A PALMITATE, CHOLECALCIFEROL, THIAMINE HYDROCHLORIDE, RIBOFLAVIN-5 PHOSPHATE SODIUM, PYRIDOXINE HYDROCHLORIDE, NIACINAMIDE, DEXPANTHENOL, ALPHA-TOCOPHEROL ACETATE, VITAMIN K1, FOLIC ACID, BIOTIN, CYANOCOBALAMIN: 200; 3300; 200; 6; 3.6; 6; 40; 15; 10; 150; 600; 60; 5 INJECTION, SOLUTION INTRAVENOUS at 20:43

## 2019-05-15 RX ADMIN — LORAZEPAM 1 MG: 1 TABLET ORAL at 22:32

## 2019-05-15 ASSESSMENT — ACTIVITIES OF DAILY LIVING (ADL)
ADLS_ACUITY_SCORE: 12

## 2019-05-15 NOTE — PROGRESS NOTES
[facility], [place]    Progress Note - Dez 2 Service        Date of Admission:  5/12/2019    Assessment & Plan   Parker Acevedo Sr. is a 48 year old with PMH that is pertinent for gastric bypass (2004) with subsequent revision to treat marginal ulceration. This required further intervention with GJ resection c/w chronic malnutarion , short gut syndrome with TPN through PICC line HFrEF 2/2 NICM , who presented to the ED after he was found to have a positive blood culture from PICC line.    Today's changes  - Unremarkable ESR, CRP, TTE  - No growth from PICC tip or periphery    #Positive blood cultures with GPCs  #Fatigue  #Gastric bypass complicated by ulceration requiring multiple revisions  #Chronic malnutrution , TPN dependant throught PICC line   Patient is completely TPN dependant. He has a Gastrostomy tube for venting only. He had routine blood draw from PICC on 5/10 that are now growing GPCs. Negative for staph, strep, enterococcus. Unclear if true infection vs colonization, however given recent fever blood culture from PICC and peripheral site repeated. Original culture from PICC positive for Rothia mucilaginosa. Unremarkable CRP, ESR and TTE. PICC removed, patient on line holiday for 24, will receive new PICC on 05/16. No growth from PICC or peripheral cultures obtained on 05/14  - Continue vancomycin  - TPN through peripheral IV  - Avoid midline access    #Papular rash - resolved  Suspected folliculitis of the abdomen, thighs and back on initial exam. Mupirocin ordered, but not applied to areas. No current signs of papular rashes.     #CXR with RML infiltrate  Likely related to recent aspiration episode. No PNA specific Abx needed.    #Hypokalemia   Replacement protocol     #HFrEF with recovered EF 2/2 NICM   - BB: Carvedilol 6.25 mg BID  - ACE/ARB: on none   - Ald Ant: EF>35%  - Volume: euvolemic     - Chronic conditions   #Chronic Pain: continue PTA oxycodone    #B12 deficiency: hold Q1 month b12 injection while inpatient   #VDD: continue PTA once a week ergocalciferol  #ADD: continue PTA adderal         Diet: Regular Diet Adult  parenteral nutrition - Clinimix E  parenteral nutrition - Clinimix E    Fluids: none  Lines: Right arm PIV  DVT Prophylaxis: ambulation  Sanchez Catheter: not present  Code Status: Full Code      Disposition Plan   Pending PICC placement on 5/16       The patient's care was discussed with the attending Dr. Edd Serna, DMD  [facility]  Oral and Maxillofacial Surgery PGY1  [phone]   ______________________________________________________________________    Interval History   No fever or chills overnight.     Physical Exam   Vital Signs: Temp: 98.3  F (36.8  C) Temp src: Oral BP: 119/86   Heart Rate: 68 Resp: 18 SpO2: 99 % O2 Device: None (Room air)    Weight: 198 lbs 1.6 oz  General: NAD, resting comfortably on exam, appears stated age,pleasant and cooperative, AAOx3.  HEENT: NC/AT, well injected Conjunctiva, anicteric sclera, EOMI; PERRL, MMM;   Neck: Trachea midline; supple, good tone; full ROM; negative for Lymphoadenopathy, and JVD  Chest: decreased air entry RLL/RML   CV: RRR; nl S1/S2, no murmurs  Abd: Soft, diffuse tender, G-port in place (+) BS;   Ext: Warm/well perfused; no Edema.  Skin: Clear; unbroken; no new rashes  Neuro: - MS: AAO x3 - no focal deficit     Data    TTE demonstrating mitral leaflet thickening   Unremarkable CRP, ESR

## 2019-05-15 NOTE — PHARMACY-VANCOMYCIN DOSING SERVICE
Pharmacy Vancomycin Note  Date of Service May 15, 2019  Patient's  1970   48 year old, male    Indication: Bacteremia   - 5/10 blood culture from PICC line = Rothia mucilaginosa (sensitivities pending)  -  blood culture x 2 pending but negative to date  -  blood culture x 2 and PICC catheter tip culture pending but negative      Goal Trough Level: 15-20 mg/L  Day of Therapy: 2  Current Vancomycin regimen:  1250 mg IV q8h    Creatinine for last 3 days  2019:  9:11 PM Creatinine 0.68 mg/dL  2019:  6:51 AM Creatinine 0.63 mg/dL  2019:  6:20 AM Creatinine 0.67 mg/dL  5/15/2019:  6:15 AM Creatinine 0.74 mg/dL  Current estimated CrCl = Estimated Creatinine Clearance: 158.6 mL/min (based on SCr of 0.74 mg/dL).      Vancomycin IV Administrations (past 72 hours)      No vancomycin orders with administrations in past 72 hours.                Nephrotoxins and other renal medications (From now, onward)    Start     Dose/Rate Route Frequency Ordered Stop    19 0730  vancomycin 1250 mg in 0.9% NaCl 250 mL intermittent infusion 1,250 mg      1,250 mg  over 2 Hours Intravenous EVERY 8 HOURS 19 2252               Contrast Orders - past 72 hours (72h ago, onward)    None        Recent Vancomycin Levels (past 3 days)  2019:  9:28 AM Vancomycin Level 13.3 mg/L  5/15/2019: 11:34 AM Vancomycin Level 14.7 mg/L    Interpretation of levels and current regimen:  Trough level is within goal range    Has serum creatinine changed > 50% in last 72 hours: No  Urine output:  unable to determine  Renal Function: Stable    Plan:  1.  Continue Current Dose  2.  Pharmacy will check trough levels as appropriate in 1-3 Days.    3. Serum creatinine levels will be ordered daily for the first week of therapy and at least twice weekly for subsequent weeks.      Teressa Taylor, PharmD, BCPS  Pager [phone]        .

## 2019-05-15 NOTE — PROGRESS NOTES
[facility]: ONGOING CONSULTATION   Parker Acevedo Sr. : 1970 Sex: male:   Medical record number [id number] Attending Physician: Shannon Puga MD  Date of Service: May 15, 2019  Problem List:   - PICC associated bacteremia 5/10/19 - Rothia mucilaginosa    - PICC site - redness x1 week (--> resolved on 5/15 exam s/p line removal)  - Intermittent fevers/ chills - resolved   - Intermittent rashes - resolved     RECOMMENDATIONS:   - Continue IV Vancomycin x 14 days total. ( from 19)   - Results from blood, catheter tip NGTD, will continue to monitor  - may replace PICC line if blood cx remains neg x 48hr.   - weekly lab: BMP, Vancomycin trough . Goal around 15   - Please arrange f/u in ID clinic with Dr Jeter on 19    ID will sign off.       Discussed with Dr. Georgi Sheehan MD, PhD  Internal Medicine PGY1  Pager: [phone]    Attestation:  This patient has been seen and evaluated by me.  I discussed this patient with  resident Dr Jameson Sheehanand agree with the findings and plan in this note. I also personally edited this note to reflect my findings. I have reviewed today's vital signs, medications, labs and imaging.    he feels better. no fever, chills, nightsweats. better energy level. PICC site redness has resolved. no pain.     left arm US - in the left axillary/subclavian vein is favored to represent a fibrin sheath given recent peripheral  line, versus less likely a chronic thrombus. Otherwise no evidence of deep venous thrombosis in the left upper extremity.  Occlusive superficial venous thrombosis in the left basilic vein in the upper arm.     Plan to treat for 14 days total.   - weekly lab: BMP, Vancomycin trough . Goal around 15   - follow-up with me on May 29 , 2019.     ID will sign off. Plan discussed with patient, wife and Primary team       Quinn Laureano MD,M.Med.Sc.  Attending, Infectious Diseases  Pager: [phone]            CHIEF INFECTIOUS DISEASES COMPLAINT:    47yo M with pmh notable for gastric bypass with complex history of complications including short gut syndrome and on chronic TPN via PICC line, HFrEF 2/2 NICM who presented to the ED after he was found to have a positive blood culture (from a PICC line blood draw). Blood culture positive for Rothia mucilaginosa and ID is consulted for assistance with management.       INTERVAL HISTORY:   NAOE. Nursing notes reviewed. Patient denies any fever, chills, rashes, worsening abdominal pain, or diarrhea.  He did have some abdominal pain overnight that he reports is chronic for him and alleviated by pain medication (oxycodone).       ROS:    A five-point review of systems was obtained and was negative with the exception of that which is described above.    Allergies   Allergen Reactions     Bactrim [Sulfamethoxazole W/Trimethoprim] Rash     Penicillins Anaphylaxis     Please see Antimicrobial Management Team allergy assessment note 10/10/2018. Patient reported tolerating amoxicillin.     Doxycycline Rash     Vancomycin Rash     Rash after receiving vancomycin 3/28/16 (red man's?). Tolerated with slower infusion and diphenhydramine premed.       No current outpatient medications on file.       CURRENT ANTI-INFECTIVES:   Vancomycin 5/12/19 -      EXAMINATION:    Vital Signs: /86 (BP Location: Right arm)   Pulse 54   Temp 98.3  F (36.8  C) (Oral)   Resp 18   Wt 89.9 kg (198 lb 1.6 oz)   SpO2 99%   BMI 27.64 kg/m     GENERAL:  well-developed, well-nourished, sitting in bed in no acute distress.   ENT:  Head is normocephalic, atraumatic. edentulous oropharynx , moist without exudates or ulcers.  EYES:  Eyes have anicteric sclerae.    LUNGS:  Clear to auscultation.  CARDIOVASCULAR:  Regular rate and rhythm with no murmurs, gallops or rubs.  ABDOMEN:  G tube site with clean bases and no discharge, Normal bowel sounds, soft, nontender.  EXT: Extremities warm and without edema,  left arm now w/o erythema at site of previous picc line  SKIN:  No acute rashes.  PIV  is in place without any surrounding erythema. Previous PICC site - redness has resolved and without tdenrenss   NEUROLOGIC:  Grossly nonfocal.      NEW DATA/RESULTS:     Culture Micro   Date Value Ref Range Status   05/14/2019 No growth after 23 hours  Preliminary   05/14/2019 No growth after 23 hours  Preliminary   05/14/2019 Culture negative monitoring continues  Preliminary   05/12/2019 No growth after 3 days  Preliminary   05/12/2019 No growth after 3 days  Preliminary       Recent Labs   Lab Test 05/14/19  0620 03/05/19  1535 08/20/18  1442 06/28/18  1400 06/21/18  1148 06/02/18  0609   CRP 5.7 <2.9 <2.9 <2.9 <2.9 30.0*     Recent Labs   Lab Test 05/15/19  0615 05/13/19  0651 05/12/19  2111 05/10/19  1525 04/10/19  1314 03/05/19  1535   WBC 6.6 5.9 7.8 6.8 7.2 7.7     Recent Labs   Lab Test 05/15/19  0615 05/14/19  0620 05/13/19  0651 05/12/19  2111   CR 0.74 0.67 0.63* 0.68   GFRESTIMATED >90 >90 >90 >90       Hematology Studies  Recent Labs   Lab Test 05/15/19  0615 05/13/19  0651 05/12/19  2111 05/10/19  1525 04/10/19  1314 03/05/19  1535 02/16/19  0224 01/25/19  1455  01/04/19  0249 01/02/19  1548  12/03/18  1445 11/19/18  1400   WBC 6.6 5.9 7.8 6.8 7.2 7.7 8.6 7.4   < > 5.2 6.8   < > 9.6 9.5   ANEU  --   --  4.3 3.6 4.9 4.0 5.6 3.4   < > 3.3 3.5   < > 5.1 7.0   AEOS  --   --  0.2  --   --   --   --  0.1  --  0.1 0.2  --  0.3 0.1   HCT 37.3* 34.3* 38.8* 35.0* 41.8 38.5* 35.8* 41.7   < > 42.9 38.5*   < > 39.3* 39.5*    144* 181 153 171 133* 113*  --    < > 134* 89*   < > 154 139*    < > = values in this interval not displayed.       Metabolic  Recent Labs   Lab Test 05/15/19  0615 05/14/19  0620 05/13/19  0651    142 142   BUN 12 9 9   CO2 28 28 27   CR 0.74 0.67 0.63*   GFRESTIMATED >90 >90 >90       Hepatic Studies  Recent Labs   Lab Test 05/14/19  0620 05/12/19  2111 05/10/19  1525   BILITOTAL 0.5 0.5 0.3    ALKPHOS 82 93 85   ALBUMIN 3.2* 3.8 3.3*   AST 15 16 17   ALT 23 28 28       Immunologlobulins  Recent Labs   Lab Test 05/14/19  1145 01/23/18  0845 01/20/18  1030   SED 13 10 11

## 2019-05-15 NOTE — PLAN OF CARE
../87 (BP Location: Left arm)   Pulse 54   Temp 97.9  F (36.6  C) (Oral)   Resp 18   Wt 89.9 kg (198 lb 1.6 oz)   SpO2 96%   BMI 27.64 kg/m    Vitals: stable  Endocrine: normal lab results  Pain: back pain  PRN's: Oxy given twice, Zofran once,refused oral potassium replacement. Magic mouth wash once and pre-medicated pt with oral benadryl before starting IV Vanco which will run over 4 hrs  Diet: Regular  LDA: New peripheral IV placed this afternoon around 1400 for IV vanco/ Plan for pt to have a Medline or PICC placed per team and ID. Jim button patent  GI: none  : voiding  Skin: intact  Neuro: A&O  Mobility: up ad vitor  Education: Reviewed medications  Plan: Continue with plan of cares and medicate per orders.

## 2019-05-15 NOTE — PLAN OF CARE
VSS on RA. No fever. Alert and oriented. C/o of abd pain radiating to the back; gave PRN 15 mg Oxycodone solution x2. C/o of nausea and reflux; gave PRN magic mouth wash x1. Independent; does go outside to smoke. Denies SOB. PICC infected, pulled yesterday, pt on 48 hours line holiday; new PIC replacement plan to be scheduled on Thursday. RPIV infusing. On Vancomycin q 8 hours.   Pt wanted vanco to run over 4 hours so PPN started at 10pm, stopped at 2am. PPN is ordered to infuse for 14 hours from 8pm to 10am. PPN will have to be infuse passed 10am due to vanco infusion. Potassium 5/14 3.3, not replaced, offered oral tablet and pt refused. Pr requested for IV potassium. Did not started potassium replacement bc vanco infusing over 4 hours. PIV was bad this morning, was leaking and painful when flushed. Pt mentioned a midline to be put in if PIV becomes bad. Not sure, but will pass this on the AM nurse. Wife, Rose is at bedside.  Continue to monitor and follow POC.

## 2019-05-15 NOTE — TELEPHONE ENCOUNTER
Was inpatient.    Date of Admission:  5/12/2019  Date of Discharge:  5/16/2019      Reason for Admission:   Bacteremia from PICC  ---------------------------------------------------------------------------------    Due date for oxyCODONE (ROXICODONE) 5 MG/5ML solution is ? Called and left a VM to inquier about amount remaining at home so we can get an accurate fill date for patient.      Jyothi Winchester, YADIRAN, RN  Care Coordinator  [facility] [place]

## 2019-05-15 NOTE — TELEPHONE ENCOUNTER
Received call from patient requesting refill(s) of oxyCODONE (ROXICODONE) 5 MG/5ML solution    Last picked up from pharmacy on 04/25/19    Pt last seen by prescribing provider on 03/13/19  Next appt scheduled for 06/12/19     checked in the past 6 months? Yes If no, print current report and give to RN    Last urine drug screen date 10/29/18  Current opioid agreement on file (completed within the last year) Yes Date of opioid agreement: 10/29/18    Processing (pick one and delete the others):      E-prescribe to [place] [facility] - [place], [place] - [address]    Will route to nursing pool for review and preparation of prescription(s).

## 2019-05-16 ENCOUNTER — PATIENT OUTREACH (OUTPATIENT)
Dept: CARE COORDINATION | Facility: CLINIC | Age: 49
End: 2019-05-16

## 2019-05-16 ENCOUNTER — APPOINTMENT (OUTPATIENT)
Dept: GENERAL RADIOLOGY | Facility: CLINIC | Age: 49
DRG: 315 | End: 2019-05-16
Payer: COMMERCIAL

## 2019-05-16 ENCOUNTER — HOME INFUSION (PRE-WILLOW HOME INFUSION) (OUTPATIENT)
Dept: PHARMACY | Facility: CLINIC | Age: 49
End: 2019-05-16

## 2019-05-16 VITALS
OXYGEN SATURATION: 98 % | DIASTOLIC BLOOD PRESSURE: 84 MMHG | SYSTOLIC BLOOD PRESSURE: 124 MMHG | RESPIRATION RATE: 18 BRPM | WEIGHT: 198.1 LBS | TEMPERATURE: 98.2 F | HEART RATE: 71 BPM | BODY MASS INDEX: 27.64 KG/M2

## 2019-05-16 LAB
ANION GAP SERPL CALCULATED.3IONS-SCNC: 4 MMOL/L (ref 3–14)
BACTERIA SPEC CULT: NO GROWTH
BUN SERPL-MCNC: 9 MG/DL (ref 7–30)
CALCIUM SERPL-MCNC: 8.3 MG/DL (ref 8.5–10.1)
CHLORIDE SERPL-SCNC: 108 MMOL/L (ref 94–109)
CO2 SERPL-SCNC: 29 MMOL/L (ref 20–32)
CREAT SERPL-MCNC: 0.75 MG/DL (ref 0.66–1.25)
GFR SERPL CREATININE-BSD FRML MDRD: >90 ML/MIN/{1.73_M2}
GLUCOSE SERPL-MCNC: 88 MG/DL (ref 70–99)
MAGNESIUM SERPL-MCNC: 2.2 MG/DL (ref 1.6–2.3)
PHOSPHATE SERPL-MCNC: 4.2 MG/DL (ref 2.5–4.5)
POTASSIUM SERPL-SCNC: 3.4 MMOL/L (ref 3.4–5.3)
SODIUM SERPL-SCNC: 141 MMOL/L (ref 133–144)
SPECIMEN SOURCE: NORMAL

## 2019-05-16 PROCEDURE — 83735 ASSAY OF MAGNESIUM: CPT | Performed by: ORTHOPAEDIC SURGERY

## 2019-05-16 PROCEDURE — 99239 HOSP IP/OBS DSCHRG MGMT >30: CPT | Mod: GC | Performed by: INTERNAL MEDICINE

## 2019-05-16 PROCEDURE — 25800030 ZZH RX IP 258 OP 636: Performed by: EMERGENCY MEDICINE

## 2019-05-16 PROCEDURE — 36569 INSJ PICC 5 YR+ W/O IMAGING: CPT

## 2019-05-16 PROCEDURE — C1751 CATH, INF, PER/CENT/MIDLINE: HCPCS

## 2019-05-16 PROCEDURE — 36415 COLL VENOUS BLD VENIPUNCTURE: CPT | Performed by: ORTHOPAEDIC SURGERY

## 2019-05-16 PROCEDURE — 27211417 ZZ H KIT, VPS RHYTHM STYLET

## 2019-05-16 PROCEDURE — 84100 ASSAY OF PHOSPHORUS: CPT | Performed by: ORTHOPAEDIC SURGERY

## 2019-05-16 PROCEDURE — 25000132 ZZH RX MED GY IP 250 OP 250 PS 637: Performed by: STUDENT IN AN ORGANIZED HEALTH CARE EDUCATION/TRAINING PROGRAM

## 2019-05-16 PROCEDURE — 25000131 ZZH RX MED GY IP 250 OP 636 PS 637: Performed by: STUDENT IN AN ORGANIZED HEALTH CARE EDUCATION/TRAINING PROGRAM

## 2019-05-16 PROCEDURE — 25000132 ZZH RX MED GY IP 250 OP 250 PS 637

## 2019-05-16 PROCEDURE — 40000986 XR CHEST PORT 1 VW

## 2019-05-16 PROCEDURE — 25000128 H RX IP 250 OP 636: Performed by: EMERGENCY MEDICINE

## 2019-05-16 PROCEDURE — 25000125 ZZHC RX 250: Performed by: STUDENT IN AN ORGANIZED HEALTH CARE EDUCATION/TRAINING PROGRAM

## 2019-05-16 PROCEDURE — 80048 BASIC METABOLIC PNL TOTAL CA: CPT | Performed by: ORTHOPAEDIC SURGERY

## 2019-05-16 RX ORDER — CEFAZOLIN SODIUM 1 G/50ML
1250 SOLUTION INTRAVENOUS EVERY 8 HOURS
Qty: 42 EACH | Refills: 0 | Status: SHIPPED | OUTPATIENT
Start: 2019-05-16 | End: 2019-07-10

## 2019-05-16 RX ORDER — DIPHENHYDRAMINE HCL 12.5MG/5ML
25 LIQUID (ML) ORAL 3 TIMES DAILY PRN
Qty: 480 ML | Refills: 0 | Status: ON HOLD | OUTPATIENT
Start: 2019-05-16 | End: 2019-10-04

## 2019-05-16 RX ORDER — DIPHENHYDRAMINE HYDROCHLORIDE AND LIDOCAINE HYDROCHLORIDE AND ALUMINUM HYDROXIDE AND MAGNESIUM HYDRO
10 KIT EVERY 6 HOURS PRN
Qty: 237 ML | Refills: 0 | Status: ON HOLD | OUTPATIENT
Start: 2019-05-16 | End: 2019-08-07

## 2019-05-16 RX ADMIN — OXYCODONE HYDROCHLORIDE 15 MG: 5 SOLUTION ORAL at 07:40

## 2019-05-16 RX ADMIN — DEXTROAMPHETAMINE SACCHARATE, AMPHETAMINE ASPARTATE, DEXTROAMPHETAMINE SULFATE AND AMPHETAMINE SULFATE 20 MG: 2.5; 2.5; 2.5; 2.5 TABLET ORAL at 07:40

## 2019-05-16 RX ADMIN — Medication 1 ML: at 10:21

## 2019-05-16 RX ADMIN — DIPHENHYDRAMINE HYDROCHLORIDE 25 MG: 25 SOLUTION ORAL at 11:20

## 2019-05-16 RX ADMIN — OXYCODONE HYDROCHLORIDE 15 MG: 5 SOLUTION ORAL at 12:35

## 2019-05-16 RX ADMIN — VANCOMYCIN HYDROCHLORIDE 1250 MG: 10 INJECTION, POWDER, LYOPHILIZED, FOR SOLUTION INTRAVENOUS at 11:20

## 2019-05-16 RX ADMIN — ONDANSETRON 4 MG: 4 TABLET, ORALLY DISINTEGRATING ORAL at 07:40

## 2019-05-16 RX ADMIN — OXYCODONE HYDROCHLORIDE 15 MG: 5 SOLUTION ORAL at 03:14

## 2019-05-16 RX ADMIN — CARVEDILOL 6.25 MG: 6.25 TABLET, FILM COATED ORAL at 07:40

## 2019-05-16 RX ADMIN — DIPHENHYDRAMINE HYDROCHLORIDE AND LIDOCAINE HYDROCHLORIDE AND ALUMINUM HYDROXIDE AND MAGNESIUM HYDRO 10 ML: KIT at 07:47

## 2019-05-16 RX ADMIN — DIPHENHYDRAMINE HYDROCHLORIDE AND LIDOCAINE HYDROCHLORIDE AND ALUMINUM HYDROXIDE AND MAGNESIUM HYDRO 10 ML: KIT at 00:05

## 2019-05-16 ASSESSMENT — ACTIVITIES OF DAILY LIVING (ADL)
ADLS_ACUITY_SCORE: 12

## 2019-05-16 ASSESSMENT — PAIN DESCRIPTION - DESCRIPTORS
DESCRIPTORS: ACHING;SHARP
DESCRIPTORS: DISCOMFORT

## 2019-05-16 NOTE — PLAN OF CARE
DISCHARGE:  Patient with orders to discharge to home with his wife.     Education Provided:   Med Card pt will use discharge paperwork and know his medications and had a PICC and IV antibiotics before at home  Lab Book lab report given and reviewed  Handouts none  [facility] [place] PICC saline locked after receiving IV Vanco    Discharge instructions, medications & follow ups reviewed with patient and his wife. Copy of discharge summary given to patient and wife. Belongings collected from room and had all personal items in his bag.    Patient in stable condition. AVSS. stable had no further questions regarding discharge instructions and medications. Patient transferred out by himself & left  7A with his wife around 1330.  Plan for home and continue with PICC and home IV antibiotics.

## 2019-05-16 NOTE — DISCHARGE SUMMARY
Medicine Discharge Summary  Parker Acevedo Sr. MRN: [id number]  1970  Primary care provider: Chuy Isaac  ___________________________________          Date of Admission:  5/12/2019  Date of Discharge:  5/16/2019   Admitting Physician:  Romeo Bingham MD  Discharge Physician:  Shannon Puga MD  Discharging Service:  Internal Medicine, [place] [id number]     Primary Provider: Chuy Isaac         Reason for Admission:   Bacteremia from PICC    From H&P  Parker Acevedo Sr. is a 48 year old with PMH that is pertinent for gastric bypass (2004) with subsequent revision to treat marginal ulceration. This required further intervention with GJ resection c/w chronic malnutarion requiring remnant gastgrostomy , short gut syndrome with TPN through PICC line HFrEF 2/2 NICM , who presented to the ED after he was found to have a positive blood culture from PICC line.     Patient and wife reported that over the past two weeks he has been having low grade fever that has been getting worse. Wife keeps a log off vitals and it seems like fevers started around 5/1 and highest was 101.6 on 5/12. Patient also reports having diffuse fatigue with this. He also noticed a rash on his arms and legs approximately one week ago. He has chronic abdominal pain diarrhea that alternates with constipation that is unchanged. He noted increase bloody discharge from gastric port. Patient also had an episode of aspiration on Friday but has not had cough or SOB since then. On 5/10 he had blood cultures drawn that came back positive for GPCs. Patient was advised to present to the ED.      Upon arrival to the ED patient as HD stable with HR 80, /100, Spo2 99% on RA. Labs were pertinent for K 3.3, Hgb 12.4, lactate 0.7. Repeat blood cultures were drawn , patient was given IV Vancomycin and admitted for further evaluation.          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 B12 deficiency  Papular rash  VDD  ADD       Procedures & Significant Findings:   PICC removal on 5/14    New PICC insertion on 5/16         Consultations:   Infectius disease         Hospital Course by Problem:    #Positive blood cultures with Rothia mucilaginosa  #Fatigue  #Gastric bypass complicated by ulceration requiring multiple revisions  #Chronic malnutrution , TPN dependant throught PICC line   Patient is completely TPN dependant. He has a Gastrostomy tube for venting only. He had routine blood draw from PICC on 5/10 that grew Rothia mucilaginosa, but  negative for staph, strep, enterococcus. Subsequent cultures from PICC and periphery with no growth, but since Rothia is a slow growing organism, ID recommended the PICC be removed and the patient undergo a 48h line holiday. Cultures from removed PICC tip with no growth. Patient placed on vancomycin 1250mg TID throughout hospitalization and discharged on IV vancomycin for an additional 2 weeks with lab monitoring per protocol.    - Vanco x2 weeks from 5/14/19 (through 5/28/19)  - Follow-up in ID clinic with Dr. Jeter on 5/29/19  - Follow-up with PCP in 1 week for hospital discharge follow-up    #Papular rash - resolved  Suspected folliculitis of the abdomen, thighs and back on initial exam. Mupirocin ordered, but not applied to areas. Lesions not present on day 2 of hospitalization.  - PCP to follow    Physical Exam on day of Discharge:  Blood pressure 124/84, pulse 71, temperature 98.2  F (36.8  C), temperature source Oral, resp. rate 18, weight 89.9 kg (198 lb 1.6 oz), SpO2 98 %.  General: NAD, resting comfortably on exam, appears stated age,pleasant and cooperative, AAOx3.  HEENT: NC/AT, well injected Conjunctiva, anicteric sclera, EOMI; PERRL, MMM;   Neck: Trachea midline; supple,  "good tone; full ROM; negative for Lymphoadenopathy, and JVD  Chest: decreased air entry RLL/RML   CV: RRR; nl S1/S2, no murmurs  Abd: Soft, diffuse tender, G-port in place (+) BS;   Ext: Warm/well perfused; no Edema.  Skin: Clear; unbroken; no new rashes  Neuro: - MS: AAO x3 - no focal deficit     Lines/Tubes:  Right PICC line         Pending Results:   None         Discharge Medications:     Discharge Medication List as of 5/16/2019 12:36 PM      START taking these medications    Details   diphenhydrAMINE (BENADRYL) 12.5 MG/5ML solution Take 10 mLs (25 mg) by mouth 3 times daily as needed for itching (Prior to vanco administration), Disp-480 mL, R-0, E-Prescribe      magic mouthwash (FIRST-MOUTHWASH BLM) compounding kit Swish and swallow 10 mLs in mouth every 6 hours as needed for mouth sores, Disp-237 mL, R-0, E-Prescribe      vancomycin 1,250 mg Inject 1,250 mg into the vein every 8 hours Infuse over 2 hours, Disp-42 each, R-0, Local Print         CONTINUE these medications which have NOT CHANGED    Details   acetaminophen (TYLENOL) 32 mg/mL liquid Take 500 mg by mouth every 4 hours as needed for fever or mild pain, Historical      albuterol (PROAIR HFA/PROVENTIL HFA/VENTOLIN HFA) 108 (90 Base) MCG/ACT Inhaler Inhale 2 puffs into the lungs every 4 hours as needed for shortness of breath / dyspnea or wheezing, Disp-1 Inhaler, R-3, E-Prescribe      amphetamine-dextroamphetamine (ADDERALL) 20 MG tablet Take 1 tablet (20 mg) by mouth daily, Disp-30 tablet, R-0, Local Print      B-D TB SYRINGE 27G X 1/2\" 1 ML MISC CARLOS, Historical      blood glucose (NO BRAND SPECIFIED) lancets standard Use to test blood sugar 1 times daily or as directed.Disp-100 each, [id number]      blood glucose (NO BRAND SPECIFIED) test strip Use to test blood sugar 1 times daily or as directed., Disp-100 strip, R-3, E-Prescribe      blood glucose monitoring (NO BRAND SPECIFIED) meter device kit Use to test blood sugar 1 times daily or as " "directed.Disp-1 kit, [id number]      carvedilol (COREG) 6.25 MG tablet Take 6.25 mg by mouth 2 times daily (with meals), Historical      [facility] HOME INFUSION MANAGED PATIENT Contact [facility] for patient specific medication information at [phone] on admission and discharge from the hospital.  Phones are answered 24 hours a day 7 days a week 365 days a year.    Providers - Choose \"CONTINUE HOME MED (no scr ipt)\" at discharge if patient treatment with home infusion will continue., No Print OutResume prior to admission TPN/Lipids/saline      lidocaine (LIDODERM) 5 % Patch Place 1-2 patches onto the skin every 24 hours Wear for 12 hours, remove for 12 hours.  OK to cut to better fit to size.Disp-60 patch, [id number]      LORazepam (ATIVAN) 1 MG tablet 1-2 mg as needed for anxiety with TPN and medications, Disp-50 tablet, R-0, Local Print      naloxone (NARCAN) 4 MG/0.1ML nasal spray Spray 4 mg into one nostril alternating nostrils once as needed every 2-3 minutes until assistance arrives, Historical      Needle, Disp, (BD DISP NEEDLES) 27G X 1/2\" MISC 1 Device every 30 days Use for cyanocobalamin injection once q 30 days., Disp-3 each, R-4, E-Prescribe      ondansetron (ZOFRAN-ODT) 8 MG ODT tab DISSOLVE ONE TABLET ON TONGUE EVERY 8 HOURS AS NEEDED FOR NAUSEA, Disp-90 tablet, R-1, E-Prescribe      !! order for DME Equipment being ordered: Bilateral knee high chronic venous insufficiency stockings--  mild-moderate pressures.Disp-4 each, R-5, Local Print      !! order for DME Injection Supplies for Vitamin B12: 3cc syringes w/ 27 gauge needles, 1/2 inch lengthDisp-12 each, R-0, Local Print      oxyCODONE (ROXICODONE) 5 MG/5ML solution Take 10-15 mLs (10-15 mg) by mouth every 4 hours as needed for pain Max 60 mg/day. Fill 4/25/19 to start on 4/28/19., Disp-1800 mL, R-0, E-Prescribe      polyethylene glycol (MIRALAX/GLYCOLAX) powder Take 17 g (1 capful) by mouth daily, Disp-578 g, R-11, " "E-Prescribe      sodium chloride 0.9% infusion Inject 1,000-2,000 mLs into the vein as needed , Historical      vitamin B-12 (CYANOCOBALAMIN) 1000 MCG/ML injection Inject 1 mL (1,000 mcg) into the muscle every 30 days, Disp-1 mL, R-11, E-Prescribe      vitamin D2 (ERGOCALCIFEROL) 91292 units (1250 mcg) capsule Take 1 capsule (50,000 Units) by mouth once a week, Disp-12 capsule, R-3, E-Prescribe       !! - Potential duplicate medications found. Please discuss with provider.               Discharge Instructions and Follow-Up:     Discharge Procedure Orders   Infectious Disease Referral     Home infusion referral   Referral Priority: Routine   Number of Visits Requested: 1     Reason for your hospital stay   Order Comments: You were admitted for a positive blood culture from your PICC line and fevers prior to admission. You were evaluated by the infectious disease team and started on IV antibiotics. Your PICC line was removed and you were given a \"line holiday\". The PICC was replaced on the day of discharge. You should continue IV antibiotics for 2 additional weeks. You will need to follow-up with Infectious Disease in clinic on 5/29/19 with Dr. Jeter.     Adult [facility] Follow-up and recommended labs and tests   Order Comments: Follow up with Dr. Jeter , in Infectious Disease on 5/29/19  for hospital follow- up.       Appointments on [place] and/or [facility] (with [facility] or [facility] provider or service). Call [phone] if you haven't heard regarding these appointments within 7 days of discharge.     Follow Up and recommended labs and tests   Order Comments: Follow up with primary care provider, Chuy Isaac, within 7 days for hospital follow- up.  No follow up labs or test are needed.     Activity   Order Comments: Your activity upon discharge: activity as tolerated     Order Specific Question Answer Comments   Is discharge order? Yes      When to contact your care team   Order Comments: Call your primary doctor " if you have any of the following: temperature greater than 100.4 or less than 95, or increased shortness of breath.     IV access   Order Comments: You are going home with the following vascular access device: PICC.     Full Code     Order Specific Question Answer Comments   Code status determined by: Discussion with patient/legal decision maker      Diet   Order Comments: Follow this diet upon discharge:     Regular Diet Adult + TPN     Order Specific Question Answer Comments   Is discharge order? Yes           IV access  Right PICC line         Discharge Disposition:   Home         Condition on Discharge:   Discharge condition: Stable   Code status on discharge: Full Code        Date of service: 5/16/2019    The patient was discussed with Dr. Shannon Puga.    Jose Serna DMD  OMFS PGY1  P: [phone]

## 2019-05-16 NOTE — TELEPHONE ENCOUNTER
Please call and ask questions re: why he is out two days early.  I do not see dose changes.    That is a lot of medication to be out - so we need a better understanding of why    Jessica Milligan MD  [facility]

## 2019-05-16 NOTE — PLAN OF CARE
/86 (BP Location: Right arm)   Pulse 54   Temp 98.3  F (36.8  C) (Oral)   Resp 18   Wt 89.9 kg (198 lb 1.6 oz)   SpO2 99%   BMI 27.64 kg/m      2673-7012: Afebrile. OVSS on RA. Pt complaining of chronic lower back pain, PRN Oxycodone given x2. Pt on regular diet, pt has a fair appetite, no complaints of nausea, pt did request PRN Zofran before bedtime. BS checks every 6 hours. BS check around 2030 was 120. PIV L Forearm currently infusing with patients scheduled dose of Vancomycin. Patients PPN stopped for Vancomycin infusion and will be restarted around 0230. Pt requested that the PPN infuse at 100 mL/hr versus 120 mL/hr due to the fact that he is concerned about blowing his PIV. Patient did have PIV infiltrate at the beginning of the shift when Vancomycin was infusing, Vascular Access paged and came up to assess patients arm, Vascular Access took initial photo and area marked on patients arm, no redness or pain currently at the site. Pt voiding but not saving. No BM this shift, per pt report he had a BM earlier in the day, pt is also passing flatus. Pt up independently, pt frequently going outside to smoke. Plan for patient to have PICC line placed tomorrow follower by possible discharge home. Call light in reach. Will continue to monitor and follow plan of care.

## 2019-05-16 NOTE — PROGRESS NOTES
Care Coordinator - Discharge Planning    Admission Date/Time:  5/12/2019  Attending MD:  Shannon Puga MD     Data  Date of initial CC assessment:  5/13/2019  Chart reviewed, discussed with interdisciplinary team.   Patient was admitted for:   1. Malnutrition, unspecified type (H)    2. Bacteremia    3. Peptic ulcer disease    4. Positive blood culture    5. Short gut syndrome         Assessment   Full assessment completed in previous note    Coordination of Care and Referrals: Provided patient/family with options for Home Infusion.    Writer discussed discharge planning with patient and his spouse. [facility] liaison at the bedside. Home infusion order placed, TPN order to be faxed by [facility] liaison. Family will provide transportation, no additional needs noted.       Plan  Anticipated Discharge Date:  5/6/2019  Anticipated Discharge Plan:  Home w/asst from family and [facility]    CTS Handoff completed:  YES    Miir Cooper, RNCC, BSN    [facility]    Medicine Group  [address] [place] [id number]    [email or web address]    Office: [phone] Pager: [phone]  To contact weekend RNCC, dial [phone] and enter pager number [phone] at prompt. This pager can not be contacted by text page or outside line.

## 2019-05-16 NOTE — PROVIDER NOTIFICATION
05/16/19 1018   PICC Double Lumen 05/16/19 Right Brachial vein medial   Placement Date/Time: 05/16/19 1018   Catheter Brand: Ocutec  Size (Fr): 5 Fr  Lot #: [id number]  Full barrier precautions done: Yes, hand hygiene, sterile gown, sterile gloves, mask, cap, full body drape, chlorhexidine scrub  Consent Signed: Yes  Time Ou...   Site Assessment WDL   External Cath Length (cm) 1 cm   Extremity Circumference (cm) 30 cm   Dressing Intervention Chlorhexidine patch;Transparent;Securing device;New dressing   Dressing Change Due 05/23/19   PICC Comment PICC insertion done   Lumen A - Color GRAY   Lumen A - Status blood return noted;saline locked   Lumen A - Cap Change Due 05/20/19   Lumen B - Color PURPLE   Lumen B - Status blood return noted;saline locked   Lumen B - Cap Change Due 05/20/19   Extravasation? No

## 2019-05-16 NOTE — TELEPHONE ENCOUNTER
Medication refill information reviewed.     Patient reports he has 750 mL left which is 12.5 day supply and means he would need to start on 5/29/19.     Due date for  oxyCODONE (ROXICODONE) 5 MG/5ML solution would have been 5/28/19 plus 4 days inpatient is 6/1/19. Please review Start date has not been adjusted. Patient is requesting to fill on 5/28/19 and start on 5/30/19.     Prescriptions prepped for review.     Will route to provider.

## 2019-05-16 NOTE — PLAN OF CARE
.BP (!) 139/99 (BP Location: Right arm)   Pulse 54   Temp 97.6  F (36.4  C) (Oral)   Resp 18   Wt 89.9 kg (198 lb 1.6 oz)   SpO2 98%   BMI 27.64 kg/m      Pt afebrile. Ovss on RA. Denies nausea. C/o intermittent lower back pain, received prn Oxycodone 15mg x1. Prn MMW x1. PIV occluded and discontinued post Vanco infusion. Pt refuses to get new PIV. TPN on hold. Also site is tender to touch, warm pack applied. 06:00 Vanco dose moved to be given post PICC line placement today, MD aware. Cont  monitor and follow poc.    Problem: Adult Inpatient Plan of Care  Goal: Plan of Care Review  5/16/2019 0601 by Roverto Douglas RN  Outcome: No Change     Problem: Adult Inpatient Plan of Care  Goal: Absence of Hospital-Acquired Illness or Injury  5/16/2019 0601 by Roverto Douglas, CHRISTY  Outcome: No Change     Problem: Adult Inpatient Plan of Care  Goal: Optimal Comfort and Wellbeing  5/16/2019 0601 by Roverto Douglas RN  Outcome: No Change     Problem: Adult Inpatient Plan of Care  Goal: Readiness for Transition of Care  5/16/2019 0601 by Roverto Douglas, CHRISTY  Outcome: Improving     Problem: Infection  Goal: Infection Symptom Resolution  5/16/2019 0601 by Roverto Douglas, CHRISTY  Outcome: No Change     Problem: Pain Chronic (Persistent) (Comorbidity Management)  Goal: Acceptable Pain Control and Functional Ability  5/16/2019 0601 by Roverto Douglas, RN  Outcome: No Change

## 2019-05-16 NOTE — PLAN OF CARE
../87   Pulse 70   Temp 98.1  F (36.7  C) (Oral)   Resp 18   Wt 89.9 kg (198 lb 1.6 oz)   SpO2 98%   BMI 27.64 kg/m    Vitals: stable  Endocrine: am lab normal  Pain: stomach and back discomfort  PRN's: Oxy, zofran and benadryl before IV Vanco dose @ 1100/refuses potassium replacements  Diet: regular  LDA: New PICC ready for use  GI: none  : voiding  Skin: intact  Neuro: A&O  Mobility: up ad vitor  Education: reviewed meds and plan of care  Plan: Continue with plan of care and medicate per orders. For discontinue this afternoon when paperwork and IV dose of Vanco is done.

## 2019-05-17 ENCOUNTER — TELEPHONE (OUTPATIENT)
Dept: INFECTIOUS DISEASES | Facility: CLINIC | Age: 49
End: 2019-05-17

## 2019-05-17 RX ORDER — OXYCODONE HCL 5 MG/5 ML
10-15 SOLUTION, ORAL ORAL EVERY 4 HOURS PRN
Qty: 1800 ML | Refills: 0 | Status: SHIPPED | OUTPATIENT
Start: 2019-05-17 | End: 2019-06-20

## 2019-05-17 NOTE — PROGRESS NOTES
Patient has clinic visit within 24-48 hours of Discharge so no post DC follow up call is needed    5/20/2019 Status: [facility]    Time: 10:30 AM Length: 30   Visit Type: UMP RETURN [[id number]] SURI: [id number]   Provider: Mikaela Ma MD Department:  INFECTIOUS DISEASE

## 2019-05-17 NOTE — TELEPHONE ENCOUNTER
Routed to RN pool as patient is asking to talk to a nurse.  When talking to patient please inform him that his prescription has been sent.   n/a

## 2019-05-17 NOTE — TELEPHONE ENCOUNTER
See request for call below.  Please let him know script sent.    Signed Prescriptions:                        Disp   Refills    oxyCODONE (ROXICODONE) 5 MG/5ML solution   1800 mL0        Sig: Take 10-15 mLs (10-15 mg) by mouth every 4 hours as           needed for pain Max 60 mg/day. Fill 5/30/19 to           start on 6/1/19.  Authorizing Provider: BRANDYN JENKINS MD  [facility]

## 2019-05-17 NOTE — TELEPHONE ENCOUNTER
[facility] Call Center    Phone Message    May a detailed message be left on voicemail: yes    Reason for Call: Other: The pt's wife called The pt was inpat from 5.12 / 5.16.19. He needs a HFU appt. The first one for Dr Jeter- is 7.2019. Please see if you have a sooner appt for the pt and call the pt's wife. Thanks.     Action Taken: Message routed to:  [facility] inf dis

## 2019-05-17 NOTE — TELEPHONE ENCOUNTER
Patient calling to speak to nurse again, he would like a call back    Carly HOWARD    [facility]

## 2019-05-17 NOTE — TELEPHONE ENCOUNTER
He reports he did not count a 90 mL supply he has set aside and in addition to that he reports the medication is sometime spiled while measuring. He reports he does not increase his dose.    YADIRA LazarN, RN  Care Coordinator  [facility]

## 2019-05-17 NOTE — PROGRESS NOTES
This is a recent snapshot of the patient's [facility] medical record.  For current drug dose and complete information and questions, call [phone] or In Basket pool, fv home infusion ([id number])  CSN Number:  [id number]

## 2019-05-20 ENCOUNTER — PATIENT OUTREACH (OUTPATIENT)
Dept: CARE COORDINATION | Facility: CLINIC | Age: 49
End: 2019-05-20

## 2019-05-20 ASSESSMENT — ACTIVITIES OF DAILY LIVING (ADL): DEPENDENT_IADLS:: MEDICATION MANAGEMENT;TRANSPORTATION;SHOPPING

## 2019-05-20 NOTE — LETTER
[facility]  Complex Care Plan  About Me:    Patient Name:  Parker Acevedo Sr.    YOB: 1970  Age:         48 year old   [place] MRN:    [id number] Telephone Information:  Home Phone [phone]   Mobile [phone]       Address:  [address] [place] [address] Email address:  [email or web address]      Emergency Contact(s)    Name Relationship Lgl Grd Work Phone Home Phone Mobile Phone   1. BERTRAND RAMOS* Spouse No  [phone]   2. JEYSON CAIN * Relative No  [phone]   3. CHARLI ACEVEDO* Mother No  [phone]           Primary language:  English     needed? No   [place] [facility]:  [phone] op. 1  Other communication barriers: Glasses  Preferred Method of Communication:  Chris  Current living arrangement: I live in a private home with spouse  Mobility Status/ Medical Equipment: Independent    Health Maintenance  Health Maintenance Reviewed: Due/Overdue   Health Maintenance Due   Topic Date Due     HF ACTION PLAN  1970     LIPID  1970     URINE DRUG SCREEN  1970     CBC  1970     HIV SCREENING  11/06/1987     DTAP/TDAP/TD IMMUNIZATION (9 - Td) 01/23/2019     TOBACCO CESSATION COUNSELING  02/06/2019        My Access Plan  Medical Emergency 911   Primary Clinic Line [facility] - [phone]   24 Hour Appointment Line [phone] or  [phone] (toll-free)   24 Hour Nurse Line [phone] (toll-free)   Preferred Urgent Care Other   Preferred Hospital [facility]  [phone]   Preferred Pharmacy [facility] [place], [place] - [address] [place] [address]     Behavioral Health Crisis Line The National Suicide Prevention Lifeline at 1-400.423.4182 or 911             My Care Team Members  Patient Care Team       Relationship Specialty Notifications Start End    Chuy Isaac MD PCP -  General Internal Medicine  10/30/18     Phone: [phone] Fax: [phone]         [address] [place] [address]    Francis Vyas MD Referring Physician Surgery  4/9/15     GI     Phone: [phone] Fax: [phone]         [address] [facility] [address] [facility] [address]    Esteban Daly MD Referring Physician Family Practice  4/9/15     Esteban Daly MD  Family Practice  6/2/15     Merged    Bill Brumfield MD MD Gastroenterology  6/2/15     Phone: [phone] Fax: [phone]         [address] [facility] [address] [facility] [address]    Patti Milligan MD MD Pain Clinic  6/2/15     Phone: [phone] Fax: [phone]         [address] [place] [address] [facility] [address]    Behl, Melissa K, RN Lead Care Coordinator Primary Care - CC Admissions 3/28/18     Phone: [phone] Fax: [phone]        [facility] ([facility] [place]  8/9/18     Phone: [phone]         [facility] [place] [place]  8/10/18     Phone: [phone]         Chuy Isaac MD Assigned PCP   11/11/18     Phone: [phone] Fax: [phone]         [address] [place] [address]    Marlyn Jenkins MD MD Ophthalmology  1/29/19     Phone: [phone] Fax: [phone]         [address] [facility] [address] [facility] [address]    Marlyn Jenkins MD MD Ophthalmology  2/11/19     Phone: [phone] Fax: [phone]         [address] [facility] [address] [facility] [address]            My Care Plans  Self Management and Treatment Plan  Goals and (Comments)  Goals        General    #1 Medical (pt-stated)     Notes - Note created  5/21/2019  4:19 PM by Behl, Melissa K, RN    Goal Statement: Spouse will schedule follow up with PCP for hospital follow up.  Measure of Success: PCP appointment will be scheduled and attended.  Supportive Steps to Achieve: Reviewed hospital discharge instructions.  Barriers: potential  schedule conflict  Strengths: supportive spouse, care coordiation  Date to Achieve By: 6/1/19  Patient expressed understanding of goal: yes         #2 Medical (pt-stated)     Notes - Note edited  5/21/2019  4:21 PM by Behl, Melissa K, RN    Goal Statement: Spouse will schedule follow up with infectious disease provider.  Measure of Success: Patient will schedule and attend infectious disease appointment.  Supportive Steps to Achieve: Reviewed hospital discharge instructions, spouse has phone number.  Barriers: multiple appointments, distance from clinic  Date to Achieve By: 6/18/19  Patient expressed understanding of goal: yes                Action Plans on File:                       Advance Care Plans/Directives Type:   Type Advanced Care Plans/Directives: Advanced Directive - On File    My Medical and Care Information  Problem List   Patient Active Problem List   Diagnosis     Peptic ulcer disease     Gastric bypass status for obesity     Chronic abdominal pain     Vomiting     Anemia     ADHD (attention deficit hyperactivity disorder), inattentive type     Vitamin B12 deficiency without anemia     Thiamine deficiency     Dehydration     Dysphagia     Weight loss, non-intentional     Malnutrition (H)     Chronic anxiety     Constipation     Bile reflux esophagitis     Former smoker     Vitamin D deficiency     Iron deficiency     Health Care Home     Chronic pain     Insomnia     Positive blood culture     Fungemia     Chronic nausea     Gastrostomy tube in place (H)     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)     Short gut syndrome     Anxiety     Status post cervical spinal arthrodesis     Port or reservoir infection, initial encounter     Abnormal echocardiogram     Low serum iron     Anemia, iron deficiency     Catheter-related bloodstream infection (CRBSI)     Generalized weakness     S/P bariatric surgery     Hyperlipidemia LDL goal <130     Bacteremia     Infection by Candida species      Current  Medications and Allergies:  See printed Medication Report.    Care Coordination Start Date: 1/10/2019   Frequency of Care Coordination: monthly   Form Last Updated: 05/21/2019

## 2019-05-20 NOTE — LETTER
[facility]  Complex Care Plan  About Me:    Patient Name:  Parker Acevedo Sr.    YOB: 1970  Age:         48 year old   [place] MRN:    [id number] Telephone Information:  Home Phone [phone]   Mobile [phone]       Address:  [address] [place] [address] Email address:  [email or web address]      Emergency Contact(s)    Name Relationship Lgl Grd Work Phone Home Phone Mobile Phone   1. BERTRAND ARMOS* Spouse No  [phone]   2. JEYSON CAIN * Relative No  [phone]   3. CHARLI ACEVEDO* Mother No  [phone]           Primary language:  English     needed? No   [place] [facility]:  [phone] op. 1  Other communication barriers: Glasses, Physical impairment  Preferred Method of Communication:  Fritzt  Current living arrangement: I live in a private home with spouse  Mobility Status/ Medical Equipment: Independent    Health Maintenance  Health Maintenance Reviewed: Due/Overdue ***    My Access Plan  Medical Emergency 911   Primary Clinic Line [facility] - [phone]   24 Hour Appointment Line [phone] or  [phone]) (toll-free)   24 Hour Nurse Line [phone] (toll-free)   Preferred Urgent Care Other   Preferred Hospital [facility]  [phone]   Preferred Pharmacy [facility] [place], [place] - [address] [place] [address]     Behavioral Health Crisis Line The National Suicide Prevention Lifeline at 1-238.673.2153 or 911             My Care Team Members  Patient Care Team       Relationship Specialty Notifications Start End    Chuy Isaac MD PCP - General Internal Medicine  10/30/18     Phone: [phone] Fax: [phone]         [address] [facility] [address]    Francis Vyas MD Referring Physician Surgery  4/9/15     GI     Phone: [phone] Fax: [phone] [address]  [facility] [address] [facility] [address]    Esteban Daly MD Referring Physician Family Practice  4/9/15     Esteban Daly MD  Family Practice  6/2/15     [facility]    Bill Brumfield MD MD Gastroenterology  6/2/15     Phone: [phone] Fax: [phone]         [address] [facility] [address] [facility] [address]    Patti Milligan MD MD Pain Clinic  6/2/15     Phone: [phone] Fax: [phone]         [address] [place] [address] [facility] [address]    Behl, Melissa K, RN Lead Care Coordinator Primary Care - CC Admissions 3/28/18     Phone: [phone] Fax: [phone]        [facility] ([facility] [place]  8/9/18     Phone: [phone]         [facility] ([facility] [place]  8/10/18     Phone: [phone]         Chuy Isaac MD Assigned PCP   11/11/18     Phone: [phone] Fax: [phone]         [address] [facility] [address]    Marlyn Jenkins MD MD Ophthalmology  1/29/19     Phone: [phone] Fax: [phone]         [address] [facility] [address] [facility] [address]    Marlyn Jenkins MD MD Ophthalmology  2/11/19     Phone: [phone] Fax: [phone]         [address] [facility] [address] [facility] [address]            My Care Plans  Self Management and Treatment Plan  Goals and (Comments)  Goals        General    #1 Medical (pt-stated)     Notes - Note created  5/21/2019  4:19 PM by Behl, Melissa K, RN    Goal Statement: Spouse will schedule follow up with PCP for hospital follow up.  Measure of Success: PCP appointment will be scheduled and attended.  Supportive Steps to Achieve: Reviewed hospital discharge instructions.  Barriers: potential schedule conflict  Strengths: supportive spouse, care coordiation  Date to Achieve By: 6/1/19  Patient expressed understanding of goal: yes         #2 Medical (pt-stated)     Notes - Note edited  5/21/2019  4:16 PM by Behl, Melissa K, RN    Goal  Statement: Spouse will schedule follow up with infectious disease provider.  Measure of Success: Patient will schedule and attend infectious disease appointment.  Supportive Steps to Achieve: Reviewed hospital discharge instructions, spouse has phone number.  Barriers: multiple appointments, distance from clinic. 2/15/19, had to cancel due to bad weather.  Strengths: supportive spouse, care coordination  Date to Achieve By: 6/18/19  Patient expressed understanding of goal: yes                Action Plans on File:                       Advance Care Plans/Directives Type:   Type Advanced Care Plans/Directives: Advanced Directive - On File    My Medical and Care Information  Problem List   Patient Active Problem List   Diagnosis     Peptic ulcer disease     Gastric bypass status for obesity     Chronic abdominal pain     Vomiting     Anemia     ADHD (attention deficit hyperactivity disorder), inattentive type     Vitamin B12 deficiency without anemia     Thiamine deficiency     Dehydration     Dysphagia     Weight loss, non-intentional     Malnutrition (H)     Chronic anxiety     Constipation     Bile reflux esophagitis     Former smoker     Vitamin D deficiency     Iron deficiency     Health Care Home     Chronic pain     Insomnia     Positive blood culture     Fungemia     Chronic nausea     Gastrostomy tube in place (H)     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)     Short gut syndrome     Anxiety     Status post cervical spinal arthrodesis     Port or reservoir infection, initial encounter     Abnormal echocardiogram     Low serum iron     Anemia, iron deficiency     Catheter-related bloodstream infection (CRBSI)     Generalized weakness     S/P bariatric surgery     Hyperlipidemia LDL goal <130     Bacteremia     Infection by Candida species      Current Medications and Allergies:  See printed Medication Report.    Care Coordination Start Date: 1/10/2019   Frequency of Care Coordination: monthly   Form Last  Updated: 05/21/2019

## 2019-05-20 NOTE — TELEPHONE ENCOUNTER
Notified patient that his script is sent to the pharmacy and the fill and start dates. Let him know that if he had any further questions about this he could call  [phone].    YADIRA LazarN, RN  Care Coordinator  [facility] [place]

## 2019-05-20 NOTE — PROGRESS NOTES
Clinic Care Coordination Contact  [facility]/Voicemail    Referral Source: IP Report  Clinical Data: Care Coordinator Outreach  Outreach attempted x 1.  Left message on voicemail with call back information and requested return call.  Plan: Care Coordinator mailed out care coordination introduction letter on 4/14/16. Care Coordinator will try to reach patient again in 1-2 business days.    Melissa Behl BSN, RN, PHN  Primary Care Clinical RN Care Coordinator  [facility]   [phone]

## 2019-05-21 ENCOUNTER — TELEPHONE (OUTPATIENT)
Dept: INTERNAL MEDICINE | Facility: CLINIC | Age: 49
End: 2019-05-21

## 2019-05-21 ENCOUNTER — MYC MEDICAL ADVICE (OUTPATIENT)
Dept: INTERNAL MEDICINE | Facility: CLINIC | Age: 49
End: 2019-05-21

## 2019-05-21 LAB — MANGANESE RBC-MCNC: 33 MCG/L

## 2019-05-21 ASSESSMENT — ACTIVITIES OF DAILY LIVING (ADL): DEPENDENT_IADLS:: MEDICATION MANAGEMENT;TRANSPORTATION;SHOPPING

## 2019-05-21 NOTE — PROGRESS NOTES
This is a recent snapshot of the patient's [facility] medical record.  For current drug dose and complete information and questions, call [phone] or In Basket pool, fv home infusion ([id number])  CSN Number:  [id number]

## 2019-05-21 NOTE — PROGRESS NOTES
Clinic Care Coordination Contact    Clinic Care Coordination Contact  OUTREACH    Referral Information:  Referral Source: IP Report    Primary Diagnosis: SIRS/Sepsis    Chief Complaint   Patient presents with     Clinic Care Coordination - Post Hospital     RN        Universal Utilization: Patient is followed Infectious Disease, Pain Management and Bariatric Surgery    Clinic Utilization  Difficulty keeping appointments:: No  Compliance Concerns: Yes  No-Show Concerns: No  No PCP office visit in Past Year: No  Utilization    Last refreshed: 5/21/2019  2:20 PM:  Hospital Admissions 4           Last refreshed: 5/21/2019  2:20 PM:  ED Visits 3           Last refreshed: 5/21/2019  2:20 PM:  No Show Count (past year) 10              Current as of: 5/21/2019  2:20 PM              Clinical Concerns:  Current Medical Concerns:  Patient was inpatient at [facility] 5/12/19-5/16/19 for infected PICC line, hypokalemia, papular rash.  PICC was removed on 5/14/19, patient was given a line holiday and a new line was placed on 5/16/19.   Patient discharged home on vancomycin.  Spouse Rose  (consent to communicate on file) reports an elevated temperature today of 100.5 during his vanco infusion.  RN CC will update PCP.  Patient is scheduled for vanco labs tomorrow.  Patient has not scheduled a follow up with PCP, spouse made a goal to schedule this.  Patient was to have an infectious disease provider appointment on 5/29/19, however, spouse was unable to attend the appointment on this date due to the time of day and traffic.  Infectious disease appointment was rescheduled for 6/18/19.  Spouse reports patient's rash resolved during hospitalization and has not resolved.  Spouse informed writer that she has spoken with patient's [employer] care coordinator who is working on finding patient a home care company.  If no home care company is an option the [employer] care coordinator will work with spouse on getting trained on blood draws from the PICC  line so patient no longer has to go to the clinic for blood draws.    Patient Active Problem List   Diagnosis     Peptic ulcer disease     Gastric bypass status for obesity     Chronic abdominal pain     Vomiting     Anemia     ADHD (attention deficit hyperactivity disorder), inattentive type     Vitamin B12 deficiency without anemia     Thiamine deficiency     Dehydration     Dysphagia     Weight loss, non-intentional     Malnutrition (H)     Chronic anxiety     Constipation     Bile reflux esophagitis     Former smoker     Vitamin D deficiency     Iron deficiency     Health Care Home     Chronic pain     Insomnia     Positive blood culture     Fungemia     Chronic nausea     Gastrostomy tube in place (H)     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)     Short gut syndrome     Anxiety     Status post cervical spinal arthrodesis     Port or reservoir infection, initial encounter     Abnormal echocardiogram     Low serum iron     Anemia, iron deficiency     Catheter-related bloodstream infection (CRBSI)     Generalized weakness     S/P bariatric surgery     Hyperlipidemia LDL goal <130     Bacteremia     Infection by Candida species      Current Behavioral Concerns: No concerns this encounter.      Education Provided to patient: RN CC reviewed hospital discharge instructions and needed appointments.     Pain  Pain (GOAL):: Yes  Type: Chronic (>3mo)  Location of chronic pain:: chronic abdominal pain and lower back pain  Limitation of routine activities due to chronic pain:: Yes  Description: Unable to perform most daily activities (chores, hobbies, social activities, driving)  Alleviating Factors: Pain Medication, Rest, Heat  Health Maintenance Reviewed: Due/Overdue   Health Maintenance Due   Topic Date Due     HF ACTION PLAN  1972     LIPID  1972     URINE DRUG SCREEN  1972     CBC  1972     HIV SCREENING  11/06/1987     DTAP/TDAP/TD IMMUNIZATION (9 - Td) 01/23/2019     TOBACCO CESSATION  COUNSELING  02/06/2019      Clinical Pathway: None    Medication Management:  Spouse assists patient with medication management.  Spouse states she has not yet picked up the magic mouthwash for patient's sores.     Functional Status:  Dependent ADLs:: Ambulation-cane, Bathing  Dependent IADLs:: Medication Management, Transportation, Shopping  Bed or wheelchair confined:: No  Mobility Status: Independent    Living Situation:  Current living arrangement:: I live in a private home with spouse  Type of residence:: Private home - stairs    Diet/Exercise/Sleep:  Diet:: Regular(But also has TPN)  Inadequate nutrition (GOAL):: No  Food Insecurity: No  Tube Feeding: No  Exercise:: Currently not exercising  Inadequate activity/exercise (GOAL):: No  Significant changes in sleep pattern (GOAL): No    Transportation:  Transportation concerns (GOAL):: No  Transportation means:: Regular car     Psychosocial:  Episcopal or spiritual beliefs that impact treatment:: No  Mental health DX:: Yes  Mental health DX how managed:: Medication  Mental health management concern (GOAL):: No  Informal Support system:: Family, Spouse     Financial/Insurance:   Financial/Insurance concerns (GOAL):: No       Resources and Interventions:  Current Resources:   List of home care services:: Skilled Nursing;   Community Resources: Home Infusion, Home Care  Supplies used at home:: None  Equipment Currently Used at Home: cane, straight, shower chair    Advance Care Plan/Directive  Advanced Care Plans/Directives on file:: Yes  Type Advanced Care Plans/Directives: Advanced Directive - On File  Advanced Care Plan/Directive Status: Not Applicable    Referrals Placed: None     Goals:   Goals        General    #1 Medical (pt-stated)     Notes - Note created  5/21/2019  4:19 PM by Behl, Melissa K, RN    Goal Statement: Spouse will schedule follow up with PCP for hospital follow up.  Measure of Success: PCP appointment will be scheduled and attended.  Supportive  Steps to Achieve: Reviewed hospital discharge instructions.  Barriers: potential schedule conflict  Strengths: supportive spouse, care coordiation  Date to Achieve By: 6/1/19  Patient expressed understanding of goal: yes         #2 Medical (pt-stated)     Notes - Note edited  5/21/2019  4:21 PM by Behl, Melissa K, RN    Goal Statement: Spouse will schedule follow up with infectious disease provider.  Measure of Success: Patient will schedule and attend infectious disease appointment.  Supportive Steps to Achieve: Reviewed hospital discharge instructions, spouse has phone number.  Barriers: multiple appointments, distance from clinic  Date to Achieve By: 6/18/19  Patient expressed understanding of goal: yes                 Patient/Caregiver understanding: Spouse has fair understanding of current plan of care, but patient and spouse have had barrier with follow through in the past.         Outreach Frequency: monthly  Future Appointments              Tomorrow NL INFUSION CHAIR 1 [facility], [facility]    In 2 weeks Melida Nash, PT [facility], [facility]    In 3 weeks Patti Milligan MD [facility], [facility]    In 4 weeks Kayy Cheek MD [facility] and Infectious Diseases, [facility]          Plan:   1. Spouse will schedule a hospital follow up appointment with PCP within 7 days of discharge.  2. Patient will see infectious disease as scheduled 6/18/19.  3. Patient will have vancomycin labs drawn 5/22/19 as scheduled.  4. RN CC will notify PCP of elevated temperature today of 100.5  5. RN CC will follow up with spouse/patient in 2 weeks per spouse request, as she plans to schedule hospital follow up with PCP within the next week.    Melissa Behl BSN, RN, PHN  Primary Care Clinical RN Care Coordinator  [facility]   [phone]

## 2019-05-21 NOTE — PROGRESS NOTES
He needs to have a blood culture done from his PICC and from his skin, so two blood cultures.      Above response from PCP.      Patient's temperature today around 3:00 pm was 100.5 during vancomycin infusion.  Patient was instructed upon hospital discharge to notify his PCP of any temperature >100.4.    Please advise.    Melissa Behl BSN, RN, PHN  Primary Care Clinical RN Care Coordinator  [facility]   [phone]

## 2019-05-21 NOTE — TELEPHONE ENCOUNTER
Georgi, MD Sheryl Boss Megan, RN   Caller: Unspecified (4 days ago,  3:21 PM)             Astrid,     If he feels well, no fever, chills, he does not need to f/u after 14 days of antibiotic from 5/14     F/U only if symptomatic     Thanks   [place]      Called Rose back and relayed Dr. Jeter's reply re: hosp f/u appt. Told Rose that if they'd like pt to be seen, he can certainly keep appt with Dr. Cheek that is already made for June.  Astrid Saucedo, RN

## 2019-05-21 NOTE — TELEPHONE ENCOUNTER
Reason for Call:  Same Day Appointment, Requested Provider:  Chuy Isaac MD    PCP: Chuy Isaac    Reason for visit: Patient was discharged on the 16th of May and was told to follow up if he continued to have fevers over 100.0, They have been going up and down since he's been home but today (05/21/2019) the fevers have been staying 100.0 and above.     Duration of symptoms: 5 days     Have you been treated for this in the past? Yes    Additional comments: not at this time    Can we leave a detailed message on this number? YES    Phone number patient can be reached at: Cell number on file:    Telephone Information:   Mobile [phone]       Best Time: any    Call taken on 5/21/2019 at 5:36 PM by Yudi Eid

## 2019-05-22 NOTE — PROGRESS NOTES
See 5/21/19 telephone encounter and 5/21/19 MyChart encounters.    Melissa Behl BSN, RN, PHN  Primary Care Clinical RN Care Coordinator  [facility]   [phone]

## 2019-05-22 NOTE — TELEPHONE ENCOUNTER
Noted in 5/21/19 telephone encounter that patient's PCP can see patient today at 2:30 pm.  RN CC attempted to contact patient and spouse, however, their phone line was busy.  [employer] message sent to patient with appointment information.    Melissa Behl BSN, RN, PHN  Primary Care Clinical RN Care Coordinator  [facility]   [phone]

## 2019-05-22 NOTE — TELEPHONE ENCOUNTER
"RN CC spoke with patient and spouse and informed them of 2:30 pm appointment time with PCP due to elevated temperature.  Patient stated he is going to try to make the appointment with infusion center and PCP, \"but I can't promise anything.\"  Patient informed writer he had diarrhea today and if he was in the middle of a diarrhea episode he would not be coming into clinic.  Patient stated he if he did make it into clinic then he would plan on going into the ED.    FYI to PCP.    Melissa Behl BSN, RN, PHN  Primary Care Clinical RN Care Coordinator  [facility]   [phone]     "

## 2019-05-22 NOTE — TELEPHONE ENCOUNTER
Patient does not have home care services.  RN CC noted message below and attempted to contact patient as well.  Phone was busy.  Will send [employer] message to patient and spouse.    Melissa Behl BSN, RN, PHN  Primary Care Clinical RN Care Coordinator  [facility]   [phone]

## 2019-05-22 NOTE — TELEPHONE ENCOUNTER
Patient has 1:00 pm infusion center appointment.  RN CC informed the infusion center of 2:30 pm PCP appointment.  The infusion center will ensure patient is brought to the PCP appointment after his infusion center appointment.    Melissa Behl BSN, RN, PHN  Primary Care Clinical RN Care Coordinator  [facility]   [phone]

## 2019-05-22 NOTE — TELEPHONE ENCOUNTER
Orders with homecare nurse to do blood cultures, are those being done?  Otherwise can see at 2:30

## 2019-05-23 ENCOUNTER — HOSPITAL ENCOUNTER (EMERGENCY)
Facility: CLINIC | Age: 49
Discharge: HOME | End: 2019-05-23
Attending: EMERGENCY MEDICINE | Admitting: EMERGENCY MEDICINE
Payer: COMMERCIAL

## 2019-05-23 ENCOUNTER — HOME INFUSION (PRE-WILLOW HOME INFUSION) (OUTPATIENT)
Dept: PHARMACY | Facility: CLINIC | Age: 49
End: 2019-05-23

## 2019-05-23 VITALS
HEART RATE: 67 BPM | DIASTOLIC BLOOD PRESSURE: 92 MMHG | OXYGEN SATURATION: 99 % | TEMPERATURE: 99.3 F | WEIGHT: 198 LBS | BODY MASS INDEX: 27.63 KG/M2 | RESPIRATION RATE: 16 BRPM | SYSTOLIC BLOOD PRESSURE: 145 MMHG

## 2019-05-23 DIAGNOSIS — E46 MALNUTRITION, UNSPECIFIED TYPE (H): Primary | ICD-10-CM

## 2019-05-23 DIAGNOSIS — R11.10 VOMITING AND DIARRHEA: ICD-10-CM

## 2019-05-23 DIAGNOSIS — R78.81 BACTEREMIA: ICD-10-CM

## 2019-05-23 DIAGNOSIS — R19.7 VOMITING AND DIARRHEA: ICD-10-CM

## 2019-05-23 LAB
ALBUMIN SERPL-MCNC: 3.4 G/DL (ref 3.4–5)
ALP SERPL-CCNC: 94 U/L (ref 40–150)
ALT SERPL W P-5'-P-CCNC: 43 U/L (ref 0–70)
ANION GAP SERPL CALCULATED.3IONS-SCNC: 8 MMOL/L (ref 3–14)
AST SERPL W P-5'-P-CCNC: 25 U/L (ref 0–45)
BASOPHILS # BLD AUTO: 0.1 10E9/L (ref 0–0.2)
BASOPHILS NFR BLD AUTO: 0.6 %
BILIRUB SERPL-MCNC: 0.4 MG/DL (ref 0.2–1.3)
BUN SERPL-MCNC: 8 MG/DL (ref 7–30)
CALCIUM SERPL-MCNC: 8 MG/DL (ref 8.5–10.1)
CHLORIDE SERPL-SCNC: 109 MMOL/L (ref 94–109)
CO2 SERPL-SCNC: 27 MMOL/L (ref 20–32)
CREAT SERPL-MCNC: 0.73 MG/DL (ref 0.66–1.25)
DIFFERENTIAL METHOD BLD: NORMAL
EOSINOPHIL NFR BLD AUTO: 1.4 %
ERYTHROCYTE [DISTWIDTH] IN BLOOD BY AUTOMATED COUNT: 12.6 % (ref 10–15)
GFR SERPL CREATININE-BSD FRML MDRD: >90 ML/MIN/{1.73_M2}
GLUCOSE SERPL-MCNC: 94 MG/DL (ref 70–99)
HCT VFR BLD AUTO: 42.7 % (ref 40–53)
HGB BLD-MCNC: 14.1 G/DL (ref 13.3–17.7)
IMM GRANULOCYTES # BLD: 0 10E9/L (ref 0–0.4)
IMM GRANULOCYTES NFR BLD: 0.3 %
LACTATE BLD-SCNC: 1.7 MMOL/L (ref 0.7–2)
LIPASE SERPL-CCNC: 117 U/L (ref 73–393)
LYMPHOCYTES # BLD AUTO: 2.3 10E9/L (ref 0.8–5.3)
LYMPHOCYTES NFR BLD AUTO: 22.5 %
MCH RBC QN AUTO: 31.3 PG (ref 26.5–33)
MCHC RBC AUTO-ENTMCNC: 33 G/DL (ref 31.5–36.5)
MCV RBC AUTO: 95 FL (ref 78–100)
MONOCYTES # BLD AUTO: 1.1 10E9/L (ref 0–1.3)
MONOCYTES NFR BLD AUTO: 11 %
NEUTROPHILS # BLD AUTO: 6.5 10E9/L (ref 1.6–8.3)
NEUTROPHILS NFR BLD AUTO: 64.2 %
NRBC # BLD AUTO: 0 10*3/UL
NRBC BLD AUTO-RTO: 0 /100
PLATELET # BLD AUTO: 188 10E9/L (ref 150–450)
POTASSIUM SERPL-SCNC: 3.8 MMOL/L (ref 3.4–5.3)
PROT SERPL-MCNC: 6.8 G/DL (ref 6.8–8.8)
RBC # BLD AUTO: 4.5 10E12/L (ref 4.4–5.9)
SODIUM SERPL-SCNC: 144 MMOL/L (ref 133–144)
WBC # BLD AUTO: 10.1 10E9/L (ref 4–11)

## 2019-05-23 PROCEDURE — 85025 COMPLETE CBC W/AUTO DIFF WBC: CPT | Performed by: EMERGENCY MEDICINE

## 2019-05-23 PROCEDURE — 99284 EMERGENCY DEPT VISIT MOD MDM: CPT | Mod: 25

## 2019-05-23 PROCEDURE — 36415 COLL VENOUS BLD VENIPUNCTURE: CPT | Performed by: EMERGENCY MEDICINE

## 2019-05-23 PROCEDURE — 25000125 ZZHC RX 250: Performed by: EMERGENCY MEDICINE

## 2019-05-23 PROCEDURE — 99284 EMERGENCY DEPT VISIT MOD MDM: CPT | Mod: Z6 | Performed by: EMERGENCY MEDICINE

## 2019-05-23 PROCEDURE — 25000132 ZZH RX MED GY IP 250 OP 250 PS 637: Performed by: EMERGENCY MEDICINE

## 2019-05-23 PROCEDURE — 96374 THER/PROPH/DIAG INJ IV PUSH: CPT

## 2019-05-23 PROCEDURE — 96376 TX/PRO/DX INJ SAME DRUG ADON: CPT

## 2019-05-23 PROCEDURE — 96375 TX/PRO/DX INJ NEW DRUG ADDON: CPT

## 2019-05-23 PROCEDURE — 83690 ASSAY OF LIPASE: CPT | Performed by: EMERGENCY MEDICINE

## 2019-05-23 PROCEDURE — 83605 ASSAY OF LACTIC ACID: CPT | Performed by: EMERGENCY MEDICINE

## 2019-05-23 PROCEDURE — 80053 COMPREHEN METABOLIC PANEL: CPT | Performed by: EMERGENCY MEDICINE

## 2019-05-23 PROCEDURE — 25000128 H RX IP 250 OP 636: Performed by: EMERGENCY MEDICINE

## 2019-05-23 PROCEDURE — 96361 HYDRATE IV INFUSION ADD-ON: CPT

## 2019-05-23 RX ORDER — SUCRALFATE 1 G/10ML
SUSPENSION ORAL
Refills: 2 | Status: ON HOLD | COMMUNITY
Start: 2019-03-15 | End: 2019-10-04

## 2019-05-23 RX ORDER — SODIUM CHLORIDE 9 MG/ML
1000 INJECTION, SOLUTION INTRAVENOUS CONTINUOUS
Status: DISCONTINUED | OUTPATIENT
Start: 2019-05-23 | End: 2019-05-23 | Stop reason: HOSPADM

## 2019-05-23 RX ORDER — CARVEDILOL 6.25 MG/1
6.25 TABLET ORAL ONCE
Status: COMPLETED | OUTPATIENT
Start: 2019-05-23 | End: 2019-05-23

## 2019-05-23 RX ORDER — ONDANSETRON 2 MG/ML
4 INJECTION INTRAMUSCULAR; INTRAVENOUS EVERY 30 MIN PRN
Status: DISCONTINUED | OUTPATIENT
Start: 2019-05-23 | End: 2019-05-23 | Stop reason: HOSPADM

## 2019-05-23 RX ORDER — HYDROMORPHONE HYDROCHLORIDE 1 MG/ML
0.5 INJECTION, SOLUTION INTRAMUSCULAR; INTRAVENOUS; SUBCUTANEOUS
Status: DISCONTINUED | OUTPATIENT
Start: 2019-05-23 | End: 2019-05-23 | Stop reason: HOSPADM

## 2019-05-23 RX ADMIN — CARVEDILOL 6.25 MG: 6.25 TABLET, FILM COATED ORAL at 17:26

## 2019-05-23 RX ADMIN — SODIUM CHLORIDE 1000 ML: 9 INJECTION, SOLUTION INTRAVENOUS at 16:04

## 2019-05-23 RX ADMIN — LIDOCAINE HYDROCHLORIDE 30 ML: 20 SOLUTION ORAL; TOPICAL at 18:18

## 2019-05-23 RX ADMIN — ONDANSETRON 4 MG: 2 INJECTION INTRAMUSCULAR; INTRAVENOUS at 16:07

## 2019-05-23 RX ADMIN — HYDROMORPHONE HYDROCHLORIDE 0.5 MG: 1 INJECTION, SOLUTION INTRAMUSCULAR; INTRAVENOUS; SUBCUTANEOUS at 17:00

## 2019-05-23 RX ADMIN — HYDROMORPHONE HYDROCHLORIDE 0.5 MG: 1 INJECTION, SOLUTION INTRAMUSCULAR; INTRAVENOUS; SUBCUTANEOUS at 16:08

## 2019-05-23 NOTE — DISCHARGE INSTRUCTIONS
As discussed your labs were normal.  I am glad you are feeling better.  Regarding your waxing and waning fevers I think it would be best for you to get in contact with the infectious disease team that saw you at the [facility].  Regarding your vomiting and diarrhea I have ordered outpatient stool studies in case it has something to do with an infection in your intestines.  If you feel like you are getting dehydrated again please return to the emergency department or if you develop high fevers or other new symptoms.

## 2019-05-23 NOTE — ED PROVIDER NOTES
"  History     Chief Complaint   Patient presents with     Fever     HPI  Parker Acevedo Sr. is a 48 year old male who recently was discharged from the  (5/16/19) after a PICC line infection - bacteremia,  presents to the ER with multiple symptoms including fevers to 100.8 yesterday, ongoing low grade fevers, vomiting, diarrhea, body aches, headache, joint pains all over, upper abdominal pains - history of ulcer. He gets iv vancomcyin every 8 hours.  \"Nothing stays down\".  He is on TPN and has a diagnosis of malnutrion. Diarrhea is not much but it is frequent. Not voluminous.  Normally he has bms about once a month.  Initial PICC line was placed for malnution and tpn. Malnutrition from gastric bypass, malnutrition and ulcers.     He has lost about 12 lbs in the last month.     From H&P during hospitalization   Parker Acevedo Sr. is a 48 year old with PMH that is pertinent for gastric bypass (2004) with subsequent revision to treat marginal ulceration. This required further intervention with GJ resection c/w chronic malnutarion requiring remnant gastgrostomy , short gut syndrome with TPN through PICC line HFrEF 2/2 NICM , who presented to the ED after he was found to have a positive blood culture from PICC line.     Patient and wife reported that over the past two weeks he has been having low grade fever that has been getting worse. Wife keeps a log off vitals and it seems like fevers started around 5/1 and highest was 101.6 on 5/12. Patient also reports having diffuse fatigue with this. He also noticed a rash on his arms and legs approximately one week ago. He has chronic abdominal pain diarrhea that alternates with constipation that is unchanged. He noted increase bloody discharge from gastric port. Patient also had an episode of aspiration on Friday but has not had cough or SOB since then. On 5/10 he had blood cultures drawn that came back positive for GPCs. Patient was advised to present to the " ED.      Upon arrival to the ED patient as HD stable with HR 80, /100, Spo2 99% on RA. Labs were pertinent for K 3.3, Hgb 12.4, lactate 0.7. Repeat blood cultures were drawn , patient was given IV Vancomycin and admitted for further evaluation.     Regarding sepsis:   Rothia mucilaginosa - bacteria that grew out    #Chronic malnutrution , TPN dependant throught PICC line   Patient is completely TPN dependant. He has a Gastrostomy tube for venting only. He had routine blood draw from PICC on 5/10 that grew Rothia mucilaginosa, but  negative for staph, strep, enterococcus. Subsequent cultures from PICC and periphery with no growth, but since Rothia is a slow growing organism, ID recommended the PICC be removed and the patient undergo a 48h line holiday. Cultures from removed PICC tip with no growth. Patient placed on vancomycin 1250mg TID throughout hospitalization and discharged on IV vancomycin for an additional 2 weeks with lab monitoring per protocol.    - Vanco x2 weeks from 5/14/19 (through 5/28/19)  - Follow-up in ID clinic with Dr. Jeter on 5/29/19  - Follow-up with PCP in 1 week for hospital discharge follow-up         Allergies:  Allergies   Allergen Reactions     Bactrim [Sulfamethoxazole W/Trimethoprim] Rash     Penicillins Anaphylaxis     Please see Antimicrobial Management Team allergy assessment note 10/10/2018. Patient reported tolerating amoxicillin.     Doxycycline Rash     Vancomycin Rash     Rash after receiving vancomycin 3/28/16 (red man's?). Tolerated with slower infusion and diphenhydramine premed.       Problem List:    Patient Active Problem List    Diagnosis Date Noted     Infection by Candida species 01/04/2019     Priority: Medium     Bacteremia 10/10/2018     Priority: Medium     Hyperlipidemia LDL goal <130 08/07/2018     Priority: Medium     S/P bariatric surgery 07/30/2018     Priority: Medium     Generalized weakness 01/30/2018     Priority: Medium     Catheter-related  bloodstream infection (CRBSI) 09/23/2017     Priority: Medium     Low serum iron 09/08/2017     Priority: Medium     Anemia, iron deficiency 09/08/2017     Priority: Medium     Abnormal echocardiogram 04/11/2017     Priority: Medium     Port or reservoir infection, initial encounter 03/16/2017     Priority: Medium     Status post cervical spinal arthrodesis 02/15/2017     Priority: Medium     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)      Priority: Medium     mild EF ~45% on rest 2/13/17, improves with stressing       Short gut syndrome      Priority: Medium     Anxiety      Priority: Medium     Gastrostomy tube in place (H) 08/09/2016     Priority: Medium     Chronic nausea 05/10/2016     Priority: Medium     Fungemia 04/11/2016     Priority: Medium     Positive blood culture 03/29/2016     Priority: Medium     Insomnia 08/13/2015     Priority: Medium     Chronic pain 07/07/2015     Priority: Medium     Patient is followed by Dr. Milligan for ongoing prescription of pain medication.  All refills should be approved by this provider, or covering partner.    Medication(s): Fentanyl 50 mcg patches every three days/ Liquid oxycodone 5 mg/5ml up to 50 mg daily.   Maximum quantity per month: as per Dr. Milligan through [facility]  Clinic visit frequency required: Q 3 months at Pain Management    Controlled substance agreement on file: Yes       Date(s): Through Pain Management    Pain Clinic evaluation in the past: Yes       Date(s):  Ongoing every three months       Location(s):  Per pain management    DIRE Total Score(s):  No flowsheet data found.    Last [facility] website verification:  Through Pain Management   [email or web address]    Esteban Daly MD             Health Care Home 02/24/2015     Priority: Medium              Iron deficiency 05/23/2014     Priority: Medium     Vitamin D deficiency 05/22/2014     Priority: Medium     Former smoker 02/24/2014     Priority: Medium     Bile reflux  esophagitis 10/16/2013     Priority: Medium     Constipation 10/01/2013     Priority: Medium     Chronic anxiety 09/25/2013     Priority: Medium     Dysphagia 09/17/2013     Priority: Medium     Weight loss, non-intentional 09/17/2013     Priority: Medium     Malnutrition (H) 09/17/2013     Priority: Medium     Dehydration 08/28/2013     Priority: Medium     Vitamin B12 deficiency without anemia 07/31/2013     Priority: Medium     Diagnosis updated by automated process. Provider to review and confirm.       Thiamine deficiency 07/31/2013     Priority: Medium     ADHD (attention deficit hyperactivity disorder), inattentive type 07/02/2013     Priority: Medium     Patient is followed by RICCO SHARP for ongoing prescription of stimulants.  All refills should be approved by this provider, or covering partner.    Medication(s): Adderall.   Maximum quantity per month: 30  Clinic visit frequency required:      Controlled substance agreement on file: No  Neuropsych evaluation for ADD completed:  No    Last [facility] website verification:  done on 5/6/19  [email or web address]         Anemia 09/20/2012     Priority: Medium     Vomiting 06/28/2012     Priority: Medium     Chronic abdominal pain 06/01/2012     Priority: Medium     Patient is followed by ALEXANDRIA WHITLEY for ongoing prescription of narcotic pain medicine.  Med: liquid oxycodone up to 60 mg per day.   Maximum use per month:   Expected duration: ongoing, hope per surgery that will resolve with upcoming surgery  Narcotic agreement on file: YES  Clinic visit recommended: Q 3 months         Peptic ulcer disease 04/08/2010     Priority: Medium     Gastric bypass status for obesity 04/08/2010     Priority: Medium     Top weight of 492.          Past Medical History:    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)      Cardiomyopathy in  nutritional diseases (H)      Chronic abdominal pain      Complication of anesthesia      Difficulty swallowing      Gastric ulcer, unspecified as acute or chronic, without mention of hemorrhage, perforation, or obstruction      Gastro-oesophageal reflux disease      Generalized weakness 1/30/2018     Head injury      Hiatal hernia      Other bladder disorder      Other chronic pain      PONV (postoperative nausea and vomiting)      Severe malnutrition (H)      Short gut syndrome      Tobacco abuse        Past Surgical History:    Past Surgical History:   Procedure Laterality Date     AMPUTATION       APPENDECTOMY       BACK SURGERY  11/3/2014    curve in the spine     BIOPSY LYMPH NODE CERVICAL N/A 2/20/2015    Procedure: BIOPSY LYMPH NODE CERVICAL;  Surgeon: Baron Scanlon MD;  Location: [facility] OR     C GASTRIC BYPASS,OBESE<100CM SHAYLEE-EN-Y  2002    lost 300 pounds     CHOLECYSTECTOMY       DISCECTOMY, FUSION CERVICAL ANTERIOR ONE LEVEL, COMBINED N/A 2/15/2017    Procedure: COMBINED DISCECTOMY, FUSION CERVICAL ANTERIOR ONE LEVEL;  Surgeon: Darren Campos MD;  Location: [facility]     ENDOSCOPIC INSERTION TUBE GASTROSTOMY  9/9/2013    Procedure: ENDOSCOPIC INSERTION TUBE GASTROSTOMY;;  Surgeon: Francis Vyas MD;  [facility]     ENDOSCOPIC ULTRASOUND UPPER GASTROINTESTINAL TRACT (GI)  4/29/2011    Procedure:ENDOSCOPIC ULTRASOUND UPPER GASTROINTESTINAL TRACT (GI); Both Procedures done Conjointly; Surgeon:NEREIDA HOUSER; [facility]     ENDOSCOPIC ULTRASOUND UPPER GASTROINTESTINAL TRACT (GI)  9/9/2013    Procedure: ENDOSCOPIC ULTRASOUND UPPER GASTROINTESTINAL TRACT (GI);  Endoscopic Ultrasound Guide Gastrostomy Tube Placement  C-arm;  Surgeon: Noe Lizarraga MD;  [facility]     ENDOSCOPY  03/25/11    EGD, [place] [facility]     ENDOSCOPY  08/04/09    Upper Endoscopy, [facility]     ENDOSCOPY  01/05/09    Upper Endoscopy, [facility]     ESOPHAGOSCOPY, GASTROSCOPY,  DUODENOSCOPY (EGD), COMBINED  4/20/2011    Procedure:COMBINED ESOPHAGOSCOPY, GASTROSCOPY, DUODENOSCOPY (EGD); Surgeon:BLU VYAS; [facility]     ESOPHAGOSCOPY, GASTROSCOPY, DUODENOSCOPY (EGD), COMBINED  6/15/2011    Procedure:COMBINED ESOPHAGOSCOPY, GASTROSCOPY, DUODENOSCOPY (EGD); Surgeon:BLU VYAS; [facility]     ESOPHAGOSCOPY, GASTROSCOPY, DUODENOSCOPY (EGD), COMBINED  6/12/2013    Procedure: COMBINED ESOPHAGOSCOPY, GASTROSCOPY, DUODENOSCOPY (EGD);;  Surgeon: Blu Vyas MD;  [facility]     ESOPHAGOSCOPY, GASTROSCOPY, DUODENOSCOPY (EGD), COMBINED  11/22/2013    Procedure: COMBINED ESOPHAGOSCOPY, GASTROSCOPY, DUODENOSCOPY (EGD);;  Surgeon: Blu Vyas MD;  [facility]     ESOPHAGOSCOPY, GASTROSCOPY, DUODENOSCOPY (EGD), COMBINED  4/30/2014    Procedure: COMBINED ESOPHAGOSCOPY, GASTROSCOPY, DUODENOSCOPY (EGD);  Surgeon: Blu Vyas MD;  [facility]     ESOPHAGOSCOPY, GASTROSCOPY, DUODENOSCOPY (EGD), COMBINED N/A 2/20/2015    Procedure: COMBINED ESOPHAGOSCOPY, GASTROSCOPY, DUODENOSCOPY (EGD), BIOPSY SINGLE OR MULTIPLE;  Surgeon: Baron Scanlon MD;  Location:  OR     ESOPHAGOSCOPY, GASTROSCOPY, DUODENOSCOPY (EGD), COMBINED N/A 9/30/2015    Procedure: COMBINED ESOPHAGOSCOPY, GASTROSCOPY, DUODENOSCOPY (EGD);  Surgeon: Blu Vyas MD;  [facility]     GASTRECTOMY  6/22/2012    Procedure: GASTRECTOMY;  Open Approach, Excise Ulcers,Partial Gastrectomy, Esophagojejunostomy, Hiatal Hernia Repair, Extensive Lysis of Adhesions and Esaphagogastrodudenoscopy.;  Surgeon: Blu Vyas MD;  [facility]     GASTROJEJUNOSTOMY  08/26/09    Extensice enterolysis, partial resect. jejunum, part. resect gastric pouch, gastrojejunostomy anastomosis     HC ESOPH/GAS REFLUX TEST W NASAL IMPED ELECTRODE  8/5/2013    Procedure: ESOPHAGEAL IMPEDENCE FUNCTION TEST 1 HOUR OR LESS;  Surgeon: Halie Lang MD;  Location:  GI     HEAD  & NECK SURGERY  2/15/2017    C5-C6     HERNIA REPAIR  2006    Umbilical hernia     HERNIORRHAPHY HIATAL  6/22/2012    Procedure: HERNIORRHAPHY HIATAL;;  Surgeon: Francis Vyas MD;  [facility]     HERNIORRHAPHY INGUINAL  11/22/2013    Procedure: HERNIORRHAPHY INGUINAL;;  Surgeon: Francis Vyas MD;  [facility]     INSERT PORT VASCULAR ACCESS Right 12/19/2017    Procedure: INSERT PORT VASCULAR ACCESS;  Right Chest Port Placement ;  Surgeon: Lisandro Alejandro PA-C;  [facility]     INSERT PORT VASCULAR ACCESS Right 8/2/2018    Procedure: INSERT PORT VASCULAR ACCESS;  Place single lumen tunneled central venous access catheter;  Surgeon: Guy Jamil PA-C;  [facility]     IR PICC PLACEMENT > 5 YRS OF AGE  3/7/2019     LAPAROTOMY EXPLORATORY  11/22/2013    Procedure: LAPAROTOMY EXPLORATORY;  Exploratory Laparotomy, Upper Endoscopy, Left Inguinal Hernia Repair;  Surgeon: Francis Vyas MD;  [facility]     ORTHOPEDIC SURGERY       PICC INSERTION Right 03/16/2017    5fr DL BioFlo PICC, 42cm (3cm external) in the R medial brachial vein w/ tip in the SVC RA junction.     PICC INSERTION Left 09/23/2017    5fr DL BioFlo PICC, 45cm (1cm external) in the L basilic vein w/ tip in the SVC RA junction.     PICC INSERTION Right 05/16/2019    5Fr - 43cm, Medial brachial vein, low SVC     SHAYLEE EN Y BOWEL  2003     SOFT TISSUE SURGERY       SOFT TISSUE SURGERY       THORACIC SURGERY       TONSILLECTOMY       TRANSESOPHAGEAL ECHOCARDIOGRAM INTRAOPERATIVE N/A 1/8/2019    Procedure: TRANSESOPHAGEAL ECHOCARDIOGRAM INTRAOPERATIVE;  Surgeon: GENERIC ANESTHESIA PROVIDER;  [facility]       Family History:    Family History   Problem Relation Age of Onset     Gastrointestinal Disease Mother         Crohns disease     Anxiety Disorder Mother      Thyroid Disease Mother         Grave's disease     Cancer Father         ear cancer-skin cancer/melanoma     Breast Cancer Maternal  "Grandmother      Macular Degeneration Maternal Grandfather      Anxiety Disorder Sister      Diabetes Maternal Uncle      Breast Cancer Other      Hypertension No family hx of      Hyperlipidemia No family hx of      Cerebrovascular Disease No family hx of      Prostate Cancer No family hx of      Depression No family hx of      Anesthesia Reaction No family hx of      Asthma No family hx of      Osteoporosis No family hx of      Genetic Disorder No family hx of      Obesity No family hx of      Mental Illness No family hx of      Substance Abuse No family hx of      Glaucoma No family hx of        Social History:  Marital Status:   [2]  Social History     Tobacco Use     Smoking status: Current Some Day Smoker     Packs/day: 0.25     Years: 3.00     Pack years: 0.75     Types: Cigarettes     Last attempt to quit: 2018     Years since quittin.8     Smokeless tobacco: Never Used     Tobacco comment: I use an e cig every now and than   Substance Use Topics     Alcohol use: No     Comment: quit      Drug use: No        Medications:      acetaminophen (TYLENOL) 32 mg/mL liquid   amphetamine-dextroamphetamine (ADDERALL) 20 MG tablet   carvedilol (COREG) 6.25 MG tablet   diphenhydrAMINE (BENADRYL) 12.5 MG/5ML solution   LORazepam (ATIVAN) 1 MG tablet   ondansetron (ZOFRAN-ODT) 8 MG ODT tab   oxyCODONE (ROXICODONE) 5 MG/5ML solution   polyethylene glycol (MIRALAX/GLYCOLAX) powder   sodium chloride 0.9% infusion   UNABLE TO FIND   vancomycin 1,250 mg   vitamin B-12 (CYANOCOBALAMIN) 1000 MCG/ML injection   albuterol (PROAIR HFA/PROVENTIL HFA/VENTOLIN HFA) 108 (90 Base) MCG/ACT Inhaler   B-D TB SYRINGE 27G X 1/2\" 1 ML MISC   blood glucose (NO BRAND SPECIFIED) lancets standard   blood glucose (NO BRAND SPECIFIED) test strip   blood glucose monitoring (NO BRAND SPECIFIED) meter device kit   CARAFATE 1 GM/10ML suspension   [facility]NAGED PATIENT   lidocaine (LIDODERM) 5 % Patch   magic " "mouthwash (FIRST-MOUTHWASH BLM) compounding kit   naloxone (NARCAN) 4 MG/0.1ML nasal spray   Needle, Disp, (BD DISP NEEDLES) 27G X 1/2\" MISC   order for DME   order for DME   vitamin D2 (ERGOCALCIFEROL) 60350 units (1250 mcg) capsule         Review of Systems   All other systems reviewed and are negative.              Physical Exam   BP: (!) 138/105  Pulse: 87  Temp: 99.5  F (37.5  C)  Resp: 20  Weight: 89.8 kg (198 lb)  SpO2: 97 %      Physical Exam   Constitutional: He is oriented to person, place, and time. He appears well-developed. No distress.   Thin.  Appears uncomfortable.   HENT:   Head: Normocephalic and atraumatic.   Eyes: Conjunctivae and EOM are normal. No scleral icterus.   Neck: Normal range of motion. Neck supple.   Cardiovascular: Normal rate and regular rhythm.   Pulmonary/Chest: Effort normal and breath sounds normal.   Abdominal: Soft. He exhibits no distension.   Feeding tube in place with no surrounding erythema mild diffuse abdominal discomfort on exam   Musculoskeletal: Normal range of motion. He exhibits no edema.   Neurological: He is alert and oriented to person, place, and time.   Skin: Skin is warm and dry. No rash noted. He is not diaphoretic.   Psychiatric: He has a normal mood and affect. His behavior is normal.   Nursing note and vitals reviewed.      ED Course        Procedures                 Results for orders placed or performed during the hospital encounter of 05/23/19 (from the past 24 hour(s))   CBC with platelets differential   Result Value Ref Range    WBC 10.1 4.0 - 11.0 10e9/L    RBC Count 4.50 4.4 - 5.9 10e12/L    Hemoglobin 14.1 13.3 - 17.7 g/dL    Hematocrit 42.7 40.0 - 53.0 %    MCV 95 78 - 100 fl    MCH 31.3 26.5 - 33.0 pg    MCHC 33.0 31.5 - 36.5 g/dL    RDW 12.6 10.0 - 15.0 %    Platelet Count 188 150 - 450 10e9/L    Diff Method Automated Method     % Neutrophils 64.2 %    % Lymphocytes 22.5 %    % Monocytes 11.0 %    % Eosinophils 1.4 %    % Basophils 0.6 %    % " Immature Granulocytes 0.3 %    Nucleated RBCs 0 0 /100    Absolute Neutrophil 6.5 1.6 - 8.3 10e9/L    Absolute Lymphocytes 2.3 0.8 - 5.3 10e9/L    Absolute Monocytes 1.1 0.0 - 1.3 10e9/L    Absolute Basophils 0.1 0.0 - 0.2 10e9/L    Abs Immature Granulocytes 0.0 0 - 0.4 10e9/L    Absolute Nucleated RBC 0.0    Comprehensive metabolic panel   Result Value Ref Range    Sodium 144 133 - 144 mmol/L    Potassium 3.8 3.4 - 5.3 mmol/L    Chloride 109 94 - 109 mmol/L    Carbon Dioxide 27 20 - 32 mmol/L    Anion Gap 8 3 - 14 mmol/L    Glucose 94 70 - 99 mg/dL    Urea Nitrogen 8 7 - 30 mg/dL    Creatinine 0.73 0.66 - 1.25 mg/dL    GFR Estimate >90 >60 mL/min/[1.73_m2]    GFR Estimate If Black >90 >60 mL/min/[1.73_m2]    Calcium 8.0 (L) 8.5 - 10.1 mg/dL    Bilirubin Total 0.4 0.2 - 1.3 mg/dL    Albumin 3.4 3.4 - 5.0 g/dL    Protein Total 6.8 6.8 - 8.8 g/dL    Alkaline Phosphatase 94 40 - 150 U/L    ALT 43 0 - 70 U/L    AST 25 0 - 45 U/L   Lactic acid whole blood   Result Value Ref Range    Lactic Acid 1.7 0.7 - 2.0 mmol/L   Lipase   Result Value Ref Range    Lipase 117 73 - 393 U/L     *Note: Due to a large number of results and/or encounters for the requested time period, some results have not been displayed. A complete set of results can be found in Results Review.       Medications   0.9% sodium chloride BOLUS (0 mLs Intravenous Stopped 5/23/19 1711)     Followed by   sodium chloride 0.9% infusion (has no administration in time range)   ondansetron (ZOFRAN) injection 4 mg (4 mg Intravenous Given 5/23/19 1607)   HYDROmorphone (PF) (DILAUDID) injection 0.5 mg (0.5 mg Intravenous Given 5/23/19 1700)   carvedilol (COREG) tablet 6.25 mg (6.25 mg Oral Given 5/23/19 1726)   lidocaine HCl (XYLOCAINE) 2 % 15 mL, alum & mag hydroxide-simethicone (MYLANTA ES/MAALOX  ES) 15 mL GI Cocktail (30 mLs Oral Given 5/23/19 1818)       Assessments & Plan (with Medical Decision Making)  Chronically ill 48-year-old male with multiple medical  problems most significantly  Infection and bacteremia on vancomycin with waxing and waning low-grade fevers presents to the emergency department not feeling well with the above and diarrhea and vomiting.  He was given IV fluids, Dilaudid and Zofran and felt remarkably better.  He was able to eat ice chips here and was happy to be able to go home.  Vital signs and labs are stable and unremarkable.  With a normal lactate and a normal white blood cell count I would argue for the fact that his bacteremia is being effectively treated.  If he continues not to feel well he should contact his infectious disease doctor or his primary care physician.  He also had some acid reflux here was given a GI cocktail with improvement. The differential diagnosis, treatment options, risks and follow up discussed with a competent patient and/or competent family member who agrees with the plan.       I have reviewed the nursing notes.    I have reviewed the findings, diagnosis, plan and need for follow up with the patient.         Medication List      There are no discharge medications for this visit.         Final diagnoses:   Vomiting and diarrhea       5/23/2019   [facility] EMERGENCY DEPARTMENT     Francis Llamas MD  05/23/19 0737

## 2019-05-23 NOTE — ED AVS SNAPSHOT
[facility] Emergency Department  [address] [place] [address]  Phone:  [phone]  Fax:  [phone]                                    Parker Acevedo Sr.   MRN: [id number]    Department:  [facility] Emergency Department   Date of Visit:  5/23/2019           After Visit Summary Signature Page    I have received my discharge instructions, and my questions have been answered. I have discussed any challenges I see with this plan with the nurse or doctor.    ..........................................................................................................................................  Patient/Patient Representative Signature      ..........................................................................................................................................  Patient Representative Print Name and Relationship to Patient    ..................................................               ................................................  Date                                   Time    ..........................................................................................................................................  Reviewed by Signature/Title    ...................................................              ..............................................  Date                                               Time          22EPIC Rev 08/18

## 2019-05-24 ENCOUNTER — PATIENT OUTREACH (OUTPATIENT)
Dept: CARE COORDINATION | Facility: CLINIC | Age: 49
End: 2019-05-24

## 2019-05-24 ENCOUNTER — APPOINTMENT (OUTPATIENT)
Dept: GENERAL RADIOLOGY | Facility: CLINIC | Age: 49
End: 2019-05-24
Attending: EMERGENCY MEDICINE
Payer: COMMERCIAL

## 2019-05-24 ENCOUNTER — MYC MEDICAL ADVICE (OUTPATIENT)
Dept: INTERNAL MEDICINE | Facility: CLINIC | Age: 49
End: 2019-05-24

## 2019-05-24 ENCOUNTER — TELEPHONE (OUTPATIENT)
Dept: INTERNAL MEDICINE | Facility: CLINIC | Age: 49
End: 2019-05-24

## 2019-05-24 ENCOUNTER — HOSPITAL ENCOUNTER (EMERGENCY)
Facility: CLINIC | Age: 49
Discharge: HOME | End: 2019-05-25
Attending: EMERGENCY MEDICINE | Admitting: EMERGENCY MEDICINE
Payer: COMMERCIAL

## 2019-05-24 DIAGNOSIS — R50.9 FEVER, UNSPECIFIED FEVER CAUSE: ICD-10-CM

## 2019-05-24 LAB
ANION GAP SERPL CALCULATED.3IONS-SCNC: 4 MMOL/L (ref 3–14)
BASOPHILS # BLD AUTO: 0.1 10E9/L (ref 0–0.2)
BASOPHILS NFR BLD AUTO: 0.6 %
BUN SERPL-MCNC: 11 MG/DL (ref 7–30)
CALCIUM SERPL-MCNC: 8.3 MG/DL (ref 8.5–10.1)
CHLORIDE SERPL-SCNC: 110 MMOL/L (ref 94–109)
CO2 SERPL-SCNC: 29 MMOL/L (ref 20–32)
CREAT SERPL-MCNC: 0.77 MG/DL (ref 0.66–1.25)
DIFFERENTIAL METHOD BLD: ABNORMAL
DIFFERENTIAL METHOD BLD: NORMAL
EOSINOPHIL # BLD AUTO: 0.2 10E9/L (ref 0–0.7)
EOSINOPHIL NFR BLD AUTO: 1.9 %
ERYTHROCYTE [DISTWIDTH] IN BLOOD BY AUTOMATED COUNT: 12.9 % (ref 10–15)
ERYTHROCYTE [DISTWIDTH] IN BLOOD BY AUTOMATED COUNT: NORMAL % (ref 10–15)
GFR SERPL CREATININE-BSD FRML MDRD: >90 ML/MIN/{1.73_M2}
GLUCOSE SERPL-MCNC: 88 MG/DL (ref 70–99)
HCT VFR BLD AUTO: 39.4 % (ref 40–53)
HCT VFR BLD AUTO: NORMAL % (ref 40–53)
HGB BLD-MCNC: 12.6 G/DL (ref 13.3–17.7)
HGB BLD-MCNC: NORMAL G/DL (ref 13.3–17.7)
IMM GRANULOCYTES # BLD: 0 10E9/L (ref 0–0.4)
IMM GRANULOCYTES NFR BLD: 0.4 %
LYMPHOCYTES # BLD AUTO: 3 10E9/L (ref 0.8–5.3)
LYMPHOCYTES NFR BLD AUTO: 30.8 %
MCH RBC QN AUTO: 30.4 PG (ref 26.5–33)
MCH RBC QN AUTO: NORMAL PG (ref 26.5–33)
MCHC RBC AUTO-ENTMCNC: 32 G/DL (ref 31.5–36.5)
MCHC RBC AUTO-ENTMCNC: NORMAL G/DL (ref 31.5–36.5)
MCV RBC AUTO: 95 FL (ref 78–100)
MCV RBC AUTO: NORMAL FL (ref 78–100)
MONOCYTES # BLD AUTO: 1 10E9/L (ref 0–1.3)
MONOCYTES NFR BLD AUTO: 10.7 %
NEUTROPHILS # BLD AUTO: 5.4 10E9/L (ref 1.6–8.3)
NEUTROPHILS NFR BLD AUTO: 55.6 %
NRBC # BLD AUTO: 0 10*3/UL
NRBC BLD AUTO-RTO: 0 /100
PLATELET # BLD AUTO: 158 10E9/L (ref 150–450)
PLATELET # BLD AUTO: NORMAL 10E9/L (ref 150–450)
POTASSIUM SERPL-SCNC: 4.4 MMOL/L (ref 3.4–5.3)
RBC # BLD AUTO: 4.14 10E12/L (ref 4.4–5.9)
RBC # BLD AUTO: NORMAL 10E12/L (ref 4.4–5.9)
SODIUM SERPL-SCNC: 142 MMOL/L (ref 133–144)
WBC # BLD AUTO: 9.7 10E9/L (ref 4–11)
WBC # BLD AUTO: NORMAL 10E9/L (ref 4–11)

## 2019-05-24 PROCEDURE — 81001 URINALYSIS AUTO W/SCOPE: CPT | Performed by: EMERGENCY MEDICINE

## 2019-05-24 PROCEDURE — 85025 COMPLETE CBC W/AUTO DIFF WBC: CPT | Performed by: EMERGENCY MEDICINE

## 2019-05-24 PROCEDURE — 96374 THER/PROPH/DIAG INJ IV PUSH: CPT | Performed by: EMERGENCY MEDICINE

## 2019-05-24 PROCEDURE — 96375 TX/PRO/DX INJ NEW DRUG ADDON: CPT | Performed by: EMERGENCY MEDICINE

## 2019-05-24 PROCEDURE — 99284 EMERGENCY DEPT VISIT MOD MDM: CPT | Mod: 25 | Performed by: EMERGENCY MEDICINE

## 2019-05-24 PROCEDURE — 71046 X-RAY EXAM CHEST 2 VIEWS: CPT

## 2019-05-24 PROCEDURE — 80048 BASIC METABOLIC PNL TOTAL CA: CPT | Performed by: EMERGENCY MEDICINE

## 2019-05-24 PROCEDURE — 99284 EMERGENCY DEPT VISIT MOD MDM: CPT | Mod: Z6 | Performed by: EMERGENCY MEDICINE

## 2019-05-24 PROCEDURE — 25000125 ZZHC RX 250: Performed by: EMERGENCY MEDICINE

## 2019-05-24 PROCEDURE — 87040 BLOOD CULTURE FOR BACTERIA: CPT | Performed by: EMERGENCY MEDICINE

## 2019-05-24 PROCEDURE — 25000128 H RX IP 250 OP 636: Performed by: EMERGENCY MEDICINE

## 2019-05-24 PROCEDURE — 25000132 ZZH RX MED GY IP 250 OP 250 PS 637: Performed by: EMERGENCY MEDICINE

## 2019-05-24 RX ORDER — ONDANSETRON 2 MG/ML
4 INJECTION INTRAMUSCULAR; INTRAVENOUS EVERY 30 MIN PRN
Status: DISCONTINUED | OUTPATIENT
Start: 2019-05-24 | End: 2019-05-25 | Stop reason: HOSPADM

## 2019-05-24 RX ORDER — OXYCODONE HYDROCHLORIDE 5 MG/1
5 TABLET ORAL ONCE
Status: COMPLETED | OUTPATIENT
Start: 2019-05-24 | End: 2019-05-24

## 2019-05-24 RX ADMIN — ONDANSETRON 4 MG: 2 INJECTION INTRAMUSCULAR; INTRAVENOUS at 23:14

## 2019-05-24 RX ADMIN — OXYCODONE HYDROCHLORIDE 5 MG: 5 TABLET ORAL at 23:14

## 2019-05-24 RX ADMIN — LIDOCAINE HYDROCHLORIDE: 20 SOLUTION ORAL; TOPICAL at 23:14

## 2019-05-24 ASSESSMENT — ENCOUNTER SYMPTOMS
ABDOMINAL PAIN: 0
VOMITING: 0
SHORTNESS OF BREATH: 0
HEADACHES: 1
NAUSEA: 1
DIARRHEA: 1
FEVER: 1

## 2019-05-24 ASSESSMENT — ACTIVITIES OF DAILY LIVING (ADL): DEPENDENT_IADLS:: MEDICATION MANAGEMENT;TRANSPORTATION;SHOPPING

## 2019-05-24 ASSESSMENT — MIFFLIN-ST. JEOR: SCORE: 1811.14

## 2019-05-24 NOTE — PROGRESS NOTES
This is a recent snapshot of the patient's [facility] medical record.  For current drug dose and complete information and questions, call [phone] or In Basket pool, [facility] home infusion ([id number])  CSN Number:  [id number]

## 2019-05-24 NOTE — TELEPHONE ENCOUNTER
Wife (caretaker) is calling to report she thinks there is an infection or something wrong with patient.  He is having a raised red rash with yellow heads on them like blisters.  The rash is on his arms and legs and back.    He is also still having a fever around 100.7 daily and has been sleeping more than normal.  She states they went to the ED last night and they were told that the infections and issues patient is having are too complicated for the ED to handle.  She would like to take him to the [facility] ED but she does not want to drive down there if he is not going to be admitted to the hospital.  RN states that there is no guarantee of being admitted.    She is informed PCP is no longer in office, there are labs that are available to be run through the PICC line, but she states she is the Home Care nurse for him as she is s certified PCA, but she is not certified to pull labs through the PICC line.      She states she was at work all day so did not have the time to call PCP or Infectious Disease provider until now.    She is informed she will have to take patient to the ED at the  for a more thorough evaluation, and possibly admit to the hospital.  Patient understands and agrees to this plan.   Closing this encounter.  Rika Garcia, YADIRAN, RN

## 2019-05-24 NOTE — PROGRESS NOTES
Clinic Care Coordination Contact  [facility]/Voicemail    Referral Source: IP Report  Clinical Data: Care Coordinator Outreach  Outreach attempted x 1.  Left message on voicemail with call back information and requested return call.  Plan: Care Coordinator mailed out care coordination introduction letter on 5/21/19. Care Coordinator will try to reach patient again in 1-2 business days.        Sesar Novak MSN, RN, PHN, CCM   Primary Care Clinical RN Care Coordinator  [facility]   [email or web address]  Office: [phone]

## 2019-05-24 NOTE — ED AVS SNAPSHOT
[facility], [place], Emergency Department  [address] [place] [id number]  Phone:  [phone]                                    Parker Acevedo SrDaryl   MRN: [id number]    Department:  [facility], Emergency Department   Date of Visit:  5/24/2019           After Visit Summary Signature Page    I have received my discharge instructions, and my questions have been answered. I have discussed any challenges I see with this plan with the nurse or doctor.    ..........................................................................................................................................  Patient/Patient Representative Signature      ..........................................................................................................................................  Patient Representative Print Name and Relationship to Patient    ..................................................               ................................................  Date                                   Time    ..........................................................................................................................................  Reviewed by Signature/Title    ...................................................              ..............................................  Date                                               Time          22EPIC Rev 08/18

## 2019-05-25 ENCOUNTER — NURSE TRIAGE (OUTPATIENT)
Dept: NURSING | Facility: CLINIC | Age: 49
End: 2019-05-25

## 2019-05-25 VITALS
HEIGHT: 71 IN | RESPIRATION RATE: 16 BRPM | SYSTOLIC BLOOD PRESSURE: 134 MMHG | DIASTOLIC BLOOD PRESSURE: 76 MMHG | BODY MASS INDEX: 28.06 KG/M2 | HEART RATE: 82 BPM | OXYGEN SATURATION: 98 % | TEMPERATURE: 98.2 F | WEIGHT: 200.4 LBS

## 2019-05-25 LAB
ALBUMIN UR-MCNC: NEGATIVE MG/DL
APPEARANCE UR: CLEAR
BILIRUB UR QL STRIP: NEGATIVE
COLOR UR AUTO: YELLOW
GLUCOSE UR STRIP-MCNC: NEGATIVE MG/DL
HGB UR QL STRIP: ABNORMAL
KETONES UR STRIP-MCNC: NEGATIVE MG/DL
LEUKOCYTE ESTERASE UR QL STRIP: NEGATIVE
MUCOUS THREADS #/AREA URNS LPF: PRESENT /LPF
NITRATE UR QL: NEGATIVE
PH UR STRIP: 6.5 PH (ref 5–7)
RBC #/AREA URNS AUTO: 6 /HPF (ref 0–2)
SOURCE: ABNORMAL
SP GR UR STRIP: 1.02 (ref 1–1.03)
SQUAMOUS #/AREA URNS AUTO: <1 /HPF (ref 0–1)
UROBILINOGEN UR STRIP-MCNC: NORMAL MG/DL (ref 0–2)
VANCOMYCIN SERPL-MCNC: 11.1 MG/L
WBC #/AREA URNS AUTO: 1 /HPF (ref 0–5)

## 2019-05-25 PROCEDURE — 25000125 ZZHC RX 250: Performed by: EMERGENCY MEDICINE

## 2019-05-25 PROCEDURE — 25000132 ZZH RX MED GY IP 250 OP 250 PS 637: Performed by: EMERGENCY MEDICINE

## 2019-05-25 PROCEDURE — 25000128 H RX IP 250 OP 636: Performed by: EMERGENCY MEDICINE

## 2019-05-25 PROCEDURE — 80202 ASSAY OF VANCOMYCIN: CPT | Performed by: EMERGENCY MEDICINE

## 2019-05-25 PROCEDURE — 25800030 ZZH RX IP 258 OP 636: Performed by: EMERGENCY MEDICINE

## 2019-05-25 RX ORDER — DIPHENHYDRAMINE HCL 12.5MG/5ML
25 LIQUID (ML) ORAL ONCE
Status: COMPLETED | OUTPATIENT
Start: 2019-05-25 | End: 2019-05-25

## 2019-05-25 RX ORDER — DIPHENHYDRAMINE HCL 25 MG
25 CAPSULE ORAL ONCE
Status: DISCONTINUED | OUTPATIENT
Start: 2019-05-25 | End: 2019-05-25 | Stop reason: CLARIF

## 2019-05-25 RX ORDER — OXYCODONE HYDROCHLORIDE 5 MG/1
5 TABLET ORAL ONCE
Status: DISCONTINUED | OUTPATIENT
Start: 2019-05-25 | End: 2019-05-25

## 2019-05-25 RX ORDER — HEPARIN SODIUM,PORCINE 10 UNIT/ML
5 VIAL (ML) INTRAVENOUS
Status: DISCONTINUED | OUTPATIENT
Start: 2019-05-25 | End: 2019-05-25

## 2019-05-25 RX ORDER — SUCRALFATE ORAL 1 G/10ML
1 SUSPENSION ORAL ONCE
Status: COMPLETED | OUTPATIENT
Start: 2019-05-25 | End: 2019-05-25

## 2019-05-25 RX ORDER — LORAZEPAM 0.5 MG/1
1 TABLET ORAL ONCE
Status: COMPLETED | OUTPATIENT
Start: 2019-05-25 | End: 2019-05-25

## 2019-05-25 RX ORDER — OXYCODONE HCL 5 MG/5 ML
5 SOLUTION, ORAL ORAL ONCE
Status: COMPLETED | OUTPATIENT
Start: 2019-05-25 | End: 2019-05-25

## 2019-05-25 RX ADMIN — DIPHENHYDRAMINE HYDROCHLORIDE 25 MG: 25 SOLUTION ORAL at 02:48

## 2019-05-25 RX ADMIN — VANCOMYCIN HYDROCHLORIDE 1250 MG: 10 INJECTION, POWDER, LYOPHILIZED, FOR SOLUTION INTRAVENOUS at 02:48

## 2019-05-25 RX ADMIN — OXYCODONE HYDROCHLORIDE 5 MG: 5 SOLUTION ORAL at 02:48

## 2019-05-25 RX ADMIN — SUCRALFATE 1 G: 1 SUSPENSION ORAL at 03:13

## 2019-05-25 RX ADMIN — LIDOCAINE HYDROCHLORIDE 30 ML: 20 SOLUTION ORAL; TOPICAL at 03:06

## 2019-05-25 RX ADMIN — LORAZEPAM 1 MG: 0.5 TABLET ORAL at 00:46

## 2019-05-25 NOTE — DISCHARGE INSTRUCTIONS
Your work-up for a source for fever was negative  I think you can go home and continue your current antibiotic plan  The key is the blood cultures that were done today if they become positive you will need to return to the ER and hospital  Return to the ER if you develop persistent elevated fever otherwise call your clinic

## 2019-05-25 NOTE — ED PROVIDER NOTES
History     Chief Complaint   Patient presents with     Fever     HPI  Parker Acevedo Sr. is a 48 year old male with a complex medical history including GJ resection complicatedd with chronic malnutrition requiring gastrostomy, short gut syndrome with TPN through PICC line, and HFrEF 2/2 NICM who presents for evaluation of fever. The patient was recently hospitalized and discharged on 5/16/19 for bacteremia secondary to PICC line infection. He was placed on IV vancomycin for two weeks, through 5/28/19. Since his discharge, he has had ongoing low grade fevers, headache, diarrhea, difficulty sleeping, congestion, and extremity rash. He was seen in the ED yesterday with a normal lactate and WBC count. He was given IVF, Diluadid, Zofran, and discharged home after feeling better. Today, he states he woke up with similar symptoms of diarrhea, diaphoresis, headache, congestion, and feeling generally unwell. He had a fever to 101.3F tonight so he decided to come to the ED for evaluation. He last took Tylenol around 6pm (5 hours ago).    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)      Chronic abdominal pain      Complication of anesthesia      Difficulty swallowing      Gastric ulcer, unspecified as acute or chronic, without mention of hemorrhage, perforation, or obstruction      Gastro-oesophageal reflux disease      Generalized weakness 1/30/2018     Head injury      Hiatal hernia      Other bladder disorder      Other chronic pain      PONV (postoperative nausea and vomiting)      Severe malnutrition (H)     TPN     Short gut syndrome      Tobacco abuse        Past Surgical History:   Procedure Laterality Date     AMPUTATION       APPENDECTOMY       BACK SURGERY  11/3/2014    curve in the spine      BIOPSY LYMPH NODE CERVICAL N/A 2/20/2015    Procedure: BIOPSY LYMPH NODE CERVICAL;  Surgeon: Baron Scanlon MD;  Location: [facility] OR     C GASTRIC BYPASS,OBESE<100CM SHAYLEE-EN-Y  2002    lost 300 pounds     CHOLECYSTECTOMY       DISCECTOMY, FUSION CERVICAL ANTERIOR ONE LEVEL, COMBINED N/A 2/15/2017    Procedure: COMBINED DISCECTOMY, FUSION CERVICAL ANTERIOR ONE LEVEL;  Surgeon: Darren Campos MD;  Location: [facility] OR     ENDOSCOPIC INSERTION TUBE GASTROSTOMY  9/9/2013    Procedure: ENDOSCOPIC INSERTION TUBE GASTROSTOMY;;  Surgeon: Francis Vyas MD;  [facility]     ENDOSCOPIC ULTRASOUND UPPER GASTROINTESTINAL TRACT (GI)  4/29/2011    Procedure:ENDOSCOPIC ULTRASOUND UPPER GASTROINTESTINAL TRACT (GI); Both Procedures done Conjointly; Surgeon:NEREIDA HOUSER; [facility]     ENDOSCOPIC ULTRASOUND UPPER GASTROINTESTINAL TRACT (GI)  9/9/2013    Procedure: ENDOSCOPIC ULTRASOUND UPPER GASTROINTESTINAL TRACT (GI);  Endoscopic Ultrasound Guide Gastrostomy Tube Placement  C-arm;  Surgeon: Noe Lizarraga MD;  [facility]     ENDOSCOPY  03/25/11    EGD, [facility]     ENDOSCOPY  08/04/09    Upper Endoscopy, [facility]     ENDOSCOPY  01/05/09    Upper Endoscopy, [facility]     ESOPHAGOSCOPY, GASTROSCOPY, DUODENOSCOPY (EGD), COMBINED  4/20/2011    Procedure:COMBINED ESOPHAGOSCOPY, GASTROSCOPY, DUODENOSCOPY (EGD); Surgeon:FRANCIS VYAS; [facility] GI     ESOPHAGOSCOPY, GASTROSCOPY, DUODENOSCOPY (EGD), COMBINED  6/15/2011    Procedure:COMBINED ESOPHAGOSCOPY, GASTROSCOPY, DUODENOSCOPY (EGD); Surgeon:FRANCIS VYAS; [facility]     ESOPHAGOSCOPY, GASTROSCOPY, DUODENOSCOPY (EGD), COMBINED  6/12/2013    Procedure: COMBINED ESOPHAGOSCOPY, GASTROSCOPY, DUODENOSCOPY (EGD);;  Surgeon: Francis Vyas MD;  [facility]     ESOPHAGOSCOPY, GASTROSCOPY, DUODENOSCOPY (EGD), COMBINED  11/22/2013    Procedure: COMBINED ESOPHAGOSCOPY, GASTROSCOPY, DUODENOSCOPY (EGD);;   Surgeon: Francis Vyas MD;  [facility]     ESOPHAGOSCOPY, GASTROSCOPY, DUODENOSCOPY (EGD), COMBINED  4/30/2014    Procedure: COMBINED ESOPHAGOSCOPY, GASTROSCOPY, DUODENOSCOPY (EGD);  Surgeon: Francis Vyas MD;  [facility]     ESOPHAGOSCOPY, GASTROSCOPY, DUODENOSCOPY (EGD), COMBINED N/A 2/20/2015    Procedure: COMBINED ESOPHAGOSCOPY, GASTROSCOPY, DUODENOSCOPY (EGD), BIOPSY SINGLE OR MULTIPLE;  Surgeon: Baron Scanlon MD;  Location[facility]     ESOPHAGOSCOPY, GASTROSCOPY, DUODENOSCOPY (EGD), COMBINED N/A 9/30/2015    Procedure: COMBINED ESOPHAGOSCOPY, GASTROSCOPY, DUODENOSCOPY (EGD);  Surgeon: Francis Vyas MD;  Location:  GI     GASTRECTOMY  6/22/2012    Procedure: GASTRECTOMY;  Open Approach, Excise Ulcers,Partial Gastrectomy, Esophagojejunostomy, Hiatal Hernia Repair, Extensive Lysis of Adhesions and Esaphagogastrodudenoscopy.;  Surgeon: Francis Vyas MD;  [facility]     GASTROJEJUNOSTOMY  08/26/09    Extensice enterolysis, partial resect. jejunum, part. resect gastric pouch, gastrojejunostomy anastomosis     HC ESOPH/GAS REFLUX TEST W NASAL IMPED ELECTRODE  8/5/2013    Procedure: ESOPHAGEAL IMPEDENCE FUNCTION TEST 1 HOUR OR LESS;  Surgeon: Halie Lang MD;  Location:  GI     HEAD & NECK SURGERY  2/15/2017    C5-C6     HERNIA REPAIR  2006    Umbilical hernia     HERNIORRHAPHY HIATAL  6/22/2012    Procedure: HERNIORRHAPHY HIATAL;;  Surgeon: Francis Vyas MD;  [facility]     HERNIORRHAPHY INGUINAL  11/22/2013    Procedure: HERNIORRHAPHY INGUINAL;;  Surgeon: Francis Vyas MD;  [facility]     INSERT PORT VASCULAR ACCESS Right 12/19/2017    Procedure: INSERT PORT VASCULAR ACCESS;  Right Chest Port Placement ;  Surgeon: Lisandro Alejandro PA-C;  Location:  OR     INSERT PORT VASCULAR ACCESS Right 8/2/2018    Procedure: INSERT PORT VASCULAR ACCESS;  Place single lumen tunneled central venous access catheter;  Surgeon: Omero  Guy Odonnell PA-C;  [facility] OR     IR PICC PLACEMENT > 5 YRS OF AGE  3/7/2019     LAPAROTOMY EXPLORATORY  11/22/2013    Procedure: LAPAROTOMY EXPLORATORY;  Exploratory Laparotomy, Upper Endoscopy, Left Inguinal Hernia Repair;  Surgeon: Francis Vyas MD;  [facility]     ORTHOPEDIC SURGERY       PICC INSERTION Right 03/16/2017    5fr DL BioFlo PICC, 42cm (3cm external) in the R medial brachial vein w/ tip in the SVC RA junction.     PICC INSERTION Left 09/23/2017    5fr DL BioFlo PICC, 45cm (1cm external) in the L basilic vein w/ tip in the SVC RA junction.     PICC INSERTION Right 05/16/2019    5Fr - 43cm, Medial brachial vein, low SVC     SHAYLEE EN Y BOWEL  2003     SOFT TISSUE SURGERY       SOFT TISSUE SURGERY       THORACIC SURGERY       TONSILLECTOMY       TRANSESOPHAGEAL ECHOCARDIOGRAM INTRAOPERATIVE N/A 1/8/2019    Procedure: TRANSESOPHAGEAL ECHOCARDIOGRAM INTRAOPERATIVE;  Surgeon: GENERIC ANESTHESIA PROVIDER;  [facility]       Family History   Problem Relation Age of Onset     Gastrointestinal Disease Mother         Crohns disease     Anxiety Disorder Mother      Thyroid Disease Mother         Grave's disease     Cancer Father         ear cancer-skin cancer/melanoma     Breast Cancer Maternal Grandmother      Macular Degeneration Maternal Grandfather      Anxiety Disorder Sister      Diabetes Maternal Uncle      Breast Cancer Other      Hypertension No family hx of      Hyperlipidemia No family hx of      Cerebrovascular Disease No family hx of      Prostate Cancer No family hx of      Depression No family hx of      Anesthesia Reaction No family hx of      Asthma No family hx of      Osteoporosis No family hx of      Genetic Disorder No family hx of      Obesity No family hx of      Mental Illness No family hx of      Substance Abuse No family hx of      Glaucoma No family hx of        Social History     Tobacco Use     Smoking status: Current Some Day Smoker     Packs/day: 0.25      "Years: 3.00     Pack years: 0.75     Types: Cigarettes     Last attempt to quit: 2018     Years since quittin.8     Smokeless tobacco: Never Used     Tobacco comment: I use an e cig every now and than   Substance Use Topics     Alcohol use: No     Comment: quit        No current facility-administered medications for this encounter.      Current Outpatient Medications   Medication     acetaminophen (TYLENOL) 32 mg/mL liquid     albuterol (PROAIR HFA/PROVENTIL HFA/VENTOLIN HFA) 108 (90 Base) MCG/ACT Inhaler     carvedilol (COREG) 6.25 MG tablet     diphenhydrAMINE (BENADRYL) 12.5 MG/5ML solution     LORazepam (ATIVAN) 1 MG tablet     oxyCODONE (ROXICODONE) 5 MG/5ML solution     vancomycin 1,250 mg     amphetamine-dextroamphetamine (ADDERALL) 20 MG tablet     B-D TB SYRINGE 27G X 1/2\" 1 ML MISC     blood glucose (NO BRAND SPECIFIED) lancets standard     blood glucose (NO BRAND SPECIFIED) test strip     blood glucose monitoring (NO BRAND SPECIFIED) meter device kit     CARAFATE 1 GM/10ML suspension     [facility] PAT[facility]     lidocaine (LIDODERM) 5 % Patch     magic mouthwash (FIRST-MOUTHWASH BLM) compounding kit     naloxone (NARCAN) 4 MG/0.1ML nasal spray     Needle, Disp, (BD DISP NEEDLES) 27G X 1/2\" MISC     ondansetron (ZOFRAN-ODT) 8 MG ODT tab     order for DME     order for DME     polyethylene glycol (MIRALAX/GLYCOLAX) powder     sodium chloride 0.9% infusion     UNABLE TO FIND     vitamin B-12 (CYANOCOBALAMIN) 1000 MCG/ML injection     vitamin D2 (ERGOCALCIFEROL) 96508 units (1250 mcg) capsule        Allergies   Allergen Reactions     Bactrim [Sulfamethoxazole W/Trimethoprim] Rash     Penicillins Anaphylaxis     Please see Antimicrobial Management Team allergy assessment note 10/10/2018. Patient reported tolerating amoxicillin.     Doxycycline Rash     Vancomycin Rash     Rash after receiving vancomycin 3/28/16 (red man's?). Tolerated with slower infusion and diphenhydramine " "premed.           I have reviewed the Medications, Allergies, Past Medical and Surgical History, and Social History in the Epic system.    Review of Systems   Constitutional: Positive for fever.   HENT: Positive for congestion.    Respiratory: Negative for shortness of breath.    Cardiovascular: Negative for chest pain.   Gastrointestinal: Positive for diarrhea and nausea. Negative for abdominal pain and vomiting.   Neurological: Positive for headaches.   All other systems reviewed and are negative.      Physical Exam   BP: 125/81  Pulse: 78  Temp: 99.8  F (37.7  C)  Resp: 16  Height: 180.3 cm (5' 11\")  Weight: 90.9 kg (200 lb 6.4 oz)(scale)  SpO2: 98 %      Physical Exam   Constitutional: He is oriented to person, place, and time. He appears well-developed and well-nourished. No distress.   HENT:   Head: Normocephalic and atraumatic.   Eyes: Pupils are equal, round, and reactive to light. Conjunctivae and EOM are normal. No scleral icterus.   Neck: Neck supple.   Cardiovascular: Normal rate, regular rhythm and normal heart sounds.   Pulmonary/Chest: Effort normal and breath sounds normal. No respiratory distress. He has no wheezes.   Abdominal: Soft. He exhibits no distension. There is no tenderness.   Musculoskeletal: He exhibits no tenderness.   Neurological: He is alert and oriented to person, place, and time. No cranial nerve deficit.   Skin: Skin is warm and dry. No erythema.   Psychiatric: He has a normal mood and affect. His behavior is normal.   Nursing note and vitals reviewed.      ED Course        Procedures               Labs Ordered and Resulted from Time of ED Arrival Up to the Time of Departure from the ED   BASIC METABOLIC PANEL - Abnormal; Notable for the following components:       Result Value    Chloride 110 (*)     Calcium 8.3 (*)     All other components within normal limits   ROUTINE UA WITH MICROSCOPIC - Abnormal; Notable for the following components:    Blood Urine Trace (*)     RBC Urine 6 " (*)     Mucous Urine Present (*)     All other components within normal limits   CBC WITH PLATELETS DIFFERENTIAL - Abnormal; Notable for the following components:    RBC Count 4.14 (*)     Hemoglobin 12.6 (*)     Hematocrit 39.4 (*)     All other components within normal limits   CBC WITH PLATELETS DIFFERENTIAL   VANCOMYCIN LEVEL            Assessments & Plan (with Medical Decision Making)   48-year-old male receiving TPN through PICC line recently treated for line related sepsis currently on IV vancomycin and a preplaced PICC line.  He had one episode of documented fever at home since then has been afebrile.  Here his labs are normal source for fever was negative including chest x-ray and urinalysis.  Repeat blood cultures were obtained.  Given his well appearance lack of fever normal white count and currently on vancomycin do not feel he needs to be admitted to the hospital.  He can follow-up on his blood cultures, should they turn positive he needs to return to the emergency department.  Contact your clinic tomorrow for following of the cultures.  The patient is in agreement with the plan.    I have reviewed the nursing notes.    I have reviewed the findings, diagnosis, plan and need for follow up with the patient.       Medication List      There are no discharge medications for this visit.         Final diagnoses:   Fever, unspecified fever cause   I, Alfredo Duarte, am serving as a trained medical scribe to document services personally performed by Frank Mcdaniel MD, based on the provider's statements to me.      Frank NJ MD, was physically present and have reviewed and verified the accuracy of this note documented by Alfredo Duarte.       5/24/2019   [facility], [place], EMERGENCY DEPARTMENT     Willian Mcdaniel MD  05/28/19 [id number]

## 2019-05-25 NOTE — ED TRIAGE NOTES
Patient presents ambulatory to triage with c/o fever. Patient reports fever of 101.3 earlier today, temp 99.8 during triage. Last took tylenol around 1800. Patient reports recent PICC line infection, is currently on IV vanco. Also reports new rash on arms and legs as of yesterday. Denies SOB.

## 2019-05-25 NOTE — PHARMACY-VANCOMYCIN DOSING SERVICE
Pharmacy Vancomycin Note  Date of Service May 25, 2019  Patient's  1970   48 year old, male    Indication: Bacteremia  Goal Trough Level: 15-20 mg/L  Day of Therapy: 11  Current Vancomycin regimen:  1250 mg IV q8h    Current estimated CrCl = Estimated Creatinine Clearance: 138.2 mL/min (based on SCr of 0.77 mg/dL).    Creatinine for last 3 days  2019:  4:06 PM Creatinine 0.73 mg/dL  2019:  9:53 PM Creatinine 0.77 mg/dL    Recent Vancomycin Levels (past 3 days)  2019:  1:04 AM Vancomycin Level 11.1 mg/L    Vancomycin IV Administrations (past 72 hours)      No vancomycin orders with administrations in past 72 hours.                Nephrotoxins and other renal medications (From now, onward)    None             Contrast Orders - past 72 hours (72h ago, onward)    None          Interpretation of levels and current regimen:  Trough level is likely Therapeutic as the trough was drawn about 4 hours late.    Has serum creatinine changed > 50% in last 72 hours: No    Urine output:  unable to determine    Renal Function: Stable    Plan:  1.  Continue Current Dose  2.  Pharmacy will check trough levels as appropriate in 1-3 Days.    3. Serum creatinine levels will be ordered daily for the first week of therapy and at least twice weekly for subsequent weeks.      Skinny Doshi        .

## 2019-05-26 ENCOUNTER — HOME INFUSION (PRE-WILLOW HOME INFUSION) (OUTPATIENT)
Dept: PHARMACY | Facility: CLINIC | Age: 49
End: 2019-05-26

## 2019-05-26 NOTE — TELEPHONE ENCOUNTER
Patient was discharged from ER this morning where he had temp of 101.3.  Spouse calling to say patient still having a fever from 100.4 to 101.  Has been on Vancomycin for over ten days.  Caller feels patient needs to be admitted.  BC are still in preliminary status.  This nurse was going to page MD to caller, but caller ended call saying they had to take another call.    Did try to call back, but there was no answer and no voice mail set up.      Reason for Disposition    [1] Caller has URGENT question (includes prescribed medication questions) AND [2] triager unable to answer question    Additional Information    Negative: Shock suspected (e.g., cold/pale/clammy skin, too weak to stand, low BP, rapid pulse)    Negative: Difficult to awaken or acting confused (e.g., disoriented, slurred speech)    Negative: Sounds like a life-threatening emergency to the triager    Negative: [1] Drinking very little AND [2] dehydration suspected (e.g., no urine > 12 hours, very dry mouth, very lightheaded)    Negative: Patient sounds very sick or weak to the triager    Negative: Fever > 104 F (40 C)    Negative: SEVERE pain (e.g., excruciating, pain scale 8-10) AND [2] not improved after pain medications    Negative: [1] Recent medical visit within 24 hours AND [2] condition/symptoms WORSE    Negative: [1] Recent medical visit within 24 hours AND [2] NEW symptom AND [3] that could be serious    Protocols used: RECENT MEDICAL VISIT FOR ILLNESS FOLLOW-UP CALL-A-

## 2019-05-28 ASSESSMENT — ACTIVITIES OF DAILY LIVING (ADL): DEPENDENT_IADLS:: MEDICATION MANAGEMENT;TRANSPORTATION;SHOPPING

## 2019-05-28 NOTE — PROGRESS NOTES
Patient was at [facility] ED 5/23/19 for nausea and vomiting and [facility] ED 5/24/19 for fever, unspecified cause.    RN CC spoke with patient's spouse (consent to communicate on file).  Last vomiting and diarrhea occurrence was yesterday.  Spouse is monitoring patient's temperature hourly, highest temperature this morning was 101 at 10:40 am.  Temperature at 1:30 pm today 99.0, but spouse states patient had just had a drink.  Temperatures have been ranging from 100.0-101.6 all weekend per spouse.  Patient is taking Tylenol 500 mg 4 times/day.    Patient reports sweats, rash on bilateral arms that was noted on previous hospitalization.  Spouse states blood is reportedly backing up in his PICC line and the ED and [facility] was aware of this.    Spouse states she has also talked with infectious disease who declined to admit patient due to negative blood cultures.  Patient has PCP follow appointment on 5/31/19.      Spouse is inquiring if PCP would like to see patient sooner or of any other recommendations.    Please advise.    Melissa Behl BSN, RN, PHN  Primary Care Clinical RN Care Coordinator  [facility]   [phone]

## 2019-05-28 NOTE — PROGRESS NOTES
This is a recent snapshot of the patient's [facility] medical record.  For current drug dose and complete information and questions, call [phone] or In Basket pool, [facility] ([id number])  CSN Number:  [id number]

## 2019-05-28 NOTE — PROGRESS NOTES
Clinic Care Coordination Contact    Clinic Care Coordination Contact  OUTREACH    Referral Information:  Referral Source: IP Report    Primary Diagnosis: SIRS/Sepsis    Chief Complaint   Patient presents with     Clinic Care Coordination - Post Hospital     primary care RN CC        Universal Utilization: Patient is followed Infectious Disease, Pain Management and Bariatric Surgery    Clinic Utilization  Difficulty keeping appointments:: No  Compliance Concerns: Yes  No-Show Concerns: No  No PCP office visit in Past Year: No  Utilization    Last refreshed: 5/28/2019  3:18 PM:  Hospital Admissions 4           Last refreshed: 5/28/2019  3:18 PM:  ED Visits 5           Last refreshed: 5/28/2019  3:18 PM:  No Show Count (past year) 12              Current as of: 5/28/2019  3:18 PM              Clinical Concerns:  Current Medical Concerns:  Patient was at [facility] ED 5/23/19 for nausea and vomiting and [facility] ED 5/24/19 for fever, unspecified cause.    RN CC spoke with patient's spouse (consent to communicate on file).  Last vomiting and diarrhea occurrence was yesterday.  Spouse is monitoring patient's temperature hourly, highest temperature this morning was 101 at 10:40 am.  Temperature at 1:30 pm today 99.0, but spouse states patient had just had a drink.  Temperatures have been ranging from 100.0-101.6 all weekend per spouse.  Patient is taking Tylenol 500 mg 4 times/day.    Patient reports sweats, rash on bilateral arms that was noted on previous hospitalization.  Spouse states blood is reportedly backing up in his PICC line and the ED and [facility] was aware of this.    Spouse states she has also talked with infectious disease who declined to admit patient due to negative blood cultures.  Patient has PCP follow appointment on 5/31/19.      Spouse is inquiring if PCP would like to see patient sooner or of any other recommendations.    Current Behavioral Concerns: No concerns this encounter.       Education Provided to patient: Reviewed to call [facility] or 24 hour nurse triage line for new or worsening symptoms or to seek emergency care.     Pain  Pain (GOAL):: Yes  Type: Chronic (>3mo)  Location of chronic pain:: chronic abdominal pain and lower back pain  Limitation of routine activities due to chronic pain:: Yes  Description: Unable to perform most daily activities (chores, hobbies, social activities, driving)  Alleviating Factors: Pain Medication, Rest, Heat  Health Maintenance Reviewed: Due/Overdue   Health Maintenance Due   Topic Date Due     DTAP/TDAP/TD IMMUNIZATION (9 - Td) 01/23/2019     TOBACCO CESSATION COUNSELING  02/06/2019      Clinical Pathway: None    Medication Management:  Spouse assists patient with medication management.        Functional Status:  Dependent ADLs:: Ambulation-cane, Bathing  Dependent IADLs:: Medication Management, Transportation, Shopping  Bed or wheelchair confined:: No  Mobility Status: Independent    Living Situation:  Current living arrangement:: I live in a private home with spouse  Type of residence:: Private home - staCounts include [facility] beds [facility]    Diet/Exercise/Sleep:  Diet:: Regular(But also has TPN)  Inadequate nutrition (GOAL):: No  Food Insecurity: No  Tube Feeding: No  Exercise:: Currently not exercising  Inadequate activity/exercise (GOAL):: No  Significant changes in sleep pattern (GOAL): No    Transportation:  Transportation concerns (GOAL):: No  Transportation means:: Regular car     Psychosocial:  Christianity or spiritual beliefs that impact treatment:: No  Mental health DX:: Yes  Mental health DX how managed:: Medication  Mental health management concern (GOAL):: No  Informal Support system:: Family, Spouse     Financial/Insurance:   Financial/Insurance concerns (GOAL):: No       Resources and Interventions:  Current Resources:   List of home care services:: Skilled Nursing;   Community Resources: Home Infusion, Home Care  Supplies used at home:: None  Equipment  Currently Used at Home: cane, straight, shower chair    Advance Care Plan/Directive  Advanced Care Plans/Directives on file:: Yes  Type Advanced Care Plans/Directives: Advanced Directive - On File  Advanced Care Plan/Directive Status: Not Applicable    Referrals Placed: None     Goals:   Goals        General    #1 Medical (pt-stated)     Notes - Note created  5/21/2019  4:19 PM by Behl, Melissa K, RN    Goal Statement: Spouse will schedule follow up with PCP for hospital follow up.  Measure of Success: PCP appointment will be scheduled and attended.  Supportive Steps to Achieve: Reviewed hospital discharge instructions.  Barriers: potential schedule conflict  Strengths: supportive spouse, care coordiation  Date to Achieve By: 6/1/19  Patient expressed understanding of goal: yes         #2 Medical (pt-stated)     Notes - Note edited  5/21/2019  4:21 PM by Behl, Melissa K, RN    Goal Statement: Spouse will schedule follow up with infectious disease provider.  Measure of Success: Patient will schedule and attend infectious disease appointment.  Supportive Steps to Achieve: Reviewed hospital discharge instructions, spouse has phone number.  Barriers: multiple appointments, distance from clinic  Date to Achieve By: 6/18/19  Patient expressed understanding of goal: yes                 Patient/Caregiver understanding: Spouse has fair understanding of current plan of care, but patient and spouse have had barrier with follow through in the past.      Outreach Frequency: monthly  Future Appointments              In 3 days Chuy Isaac MD [facility]    In 1 week [facility] Nash [facility]    In 2 weeks Patti Milligan MD [facility] [place]    In 3 weeks Kayy Cheek MD [facility] and Infectious Diseases, [facility]          Plan:   1. Patient will attend hospital follow up appointment with PCP  5/31/19.  2. Patient will see infectious disease scheduled 6/18/19.  3. RN CC will notify PCP of persisting elevated temps s/p 2 ED visits.  4. RN CC will follow up with patient in 1-2 weeks.    Melissa Behl BSN, RN, PHN  Primary Care Clinical RN Care Coordinator  [facility]   [phone]

## 2019-05-29 ASSESSMENT — ACTIVITIES OF DAILY LIVING (ADL): DEPENDENT_IADLS:: MEDICATION MANAGEMENT;TRANSPORTATION;SHOPPING

## 2019-05-29 NOTE — PROGRESS NOTES
Clinic Care Coordination Contact  RN CC left a detailed message on patient's phone with provider's message below.    Melissa Behl BSN, RN, PHN  Primary Care Clinical RN Care Coordinator  [facility]   [phone]

## 2019-05-30 ENCOUNTER — HOME INFUSION (PRE-WILLOW HOME INFUSION) (OUTPATIENT)
Dept: PHARMACY | Facility: CLINIC | Age: 49
End: 2019-05-30

## 2019-05-30 ENCOUNTER — TELEPHONE (OUTPATIENT)
Dept: SURGERY | Facility: CLINIC | Age: 49
End: 2019-05-30

## 2019-05-30 DIAGNOSIS — R78.81 POSITIVE BLOOD CULTURE: Primary | ICD-10-CM

## 2019-05-30 LAB
BACTERIA SPEC CULT: NO GROWTH
Lab: NORMAL
SPECIMEN SOURCE: NORMAL

## 2019-05-30 NOTE — TELEPHONE ENCOUNTER
----- Message from Beth Thomas sent at 5/30/2019  2:43 PM CDT -----  Regarding: FW: heparin flush?      ----- Message -----  From: Jyothi Peraza [facility]  Sent: 5/30/2019   2:35 PM  To: [facility]  Subject: heparin flush?                                   Parker Santana is a patient of Dr. Vyas on home TPN through [facility].  His wife is reporting blood back up into his PICC line.  He has a valved PICC, which is maintained with NS flushes only.  May I have an order to use heparin flush 50 units/5mL for this patient as we suspect the valve on his PICC is no longer effective?    Thank you!  Jyothi Peraza, PharmD [facility]   Ph: [phone]  Fax: [phone]

## 2019-05-31 ENCOUNTER — HOME INFUSION (PRE-WILLOW HOME INFUSION) (OUTPATIENT)
Dept: PHARMACY | Facility: CLINIC | Age: 49
End: 2019-05-31

## 2019-05-31 LAB
BACTERIA SPEC CULT: NO GROWTH
Lab: NORMAL
SPECIMEN SOURCE: NORMAL

## 2019-05-31 NOTE — PROGRESS NOTES
This is a recent snapshot of the patient's [facility] medical record.  For current drug dose and complete information and questions, call [phone] or In [facility] pool, fv home infusion ([id number])  CSN Number:  [id number]

## 2019-06-03 ENCOUNTER — TELEPHONE (OUTPATIENT)
Dept: INTERNAL MEDICINE | Facility: CLINIC | Age: 49
End: 2019-06-03

## 2019-06-03 NOTE — TELEPHONE ENCOUNTER
Reason for Call: Request for an order or referral:    Order or referral being requested: [facility] is calling stating spouse is asking for home care to start for patient. Home care nurse is requesting -  OK for start of care to start within the next 48 hours, last 2 clinic visit notes, med list, and a face to face encounter. Please fax this to [phone] Attn: intake    Date needed: as soon as possible    Has the patient been seen by the PCP for this problem? YES    Additional comments:     Phone number Patient can be reached at:  Other phone number:  [phone] Akila    Best Time:  any    Can we leave a detailed message on this number?  YES    Call taken on 6/3/2019 at 12:48 PM by Elaine Hoffman

## 2019-06-03 NOTE — TELEPHONE ENCOUNTER
Akila states that if we fax over LOV notes from 4/10/19 and 1/18/19. They will be able to open a case for patient to start home care. Once in the home they will review medications with patient and then fax over medication list for approval from Dr. Isaac. Patient will need to follow through and attend his appointment on 6/19/19 with Dr. Isaac.     Can they get approval to start home care orders and approval to have LOV notes faxed to [phone] Attn: intake?    They are aware that Dr. Isaac is gone for the day and will be returning on 6/4/19.    Pratibha Ferris, CMA

## 2019-06-04 ENCOUNTER — HOME INFUSION (PRE-WILLOW HOME INFUSION) (OUTPATIENT)
Dept: PHARMACY | Facility: CLINIC | Age: 49
End: 2019-06-04

## 2019-06-04 NOTE — TELEPHONE ENCOUNTER
Miri calls this morning to let Dr Isaac's care team know that she is going to fax one of the his office notes back to have Dr Isaac sign it and she is also requesting a face sheet for Parker with Demographics and Insurance information.

## 2019-06-04 NOTE — PROGRESS NOTES
This is a recent snapshot of the patient's [facility] medical record.  For current drug dose and complete information and questions, call [phone] or In Basket pool, fv home infusion ([id number])  CSN Number:  [id number]

## 2019-06-05 DIAGNOSIS — F90.0 ADHD (ATTENTION DEFICIT HYPERACTIVITY DISORDER), INATTENTIVE TYPE: ICD-10-CM

## 2019-06-05 DIAGNOSIS — F41.9 ANXIETY: ICD-10-CM

## 2019-06-05 RX ORDER — DEXTROAMPHETAMINE SACCHARATE, AMPHETAMINE ASPARTATE, DEXTROAMPHETAMINE SULFATE AND AMPHETAMINE SULFATE 5; 5; 5; 5 MG/1; MG/1; MG/1; MG/1
20 TABLET ORAL DAILY
Qty: 30 TABLET | Refills: 0 | Status: SHIPPED | OUTPATIENT
Start: 2019-06-05 | End: 2019-07-10

## 2019-06-06 ENCOUNTER — PATIENT OUTREACH (OUTPATIENT)
Dept: CARE COORDINATION | Facility: CLINIC | Age: 49
End: 2019-06-06

## 2019-06-06 ASSESSMENT — ACTIVITIES OF DAILY LIVING (ADL): DEPENDENT_IADLS:: MEDICATION MANAGEMENT;TRANSPORTATION;SHOPPING

## 2019-06-06 NOTE — TELEPHONE ENCOUNTER
Script faxed to [facility] [phone] pharmacy.and walked down to pharmacy for carrier to .  Rika Palencia MA
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
## 2019-06-06 NOTE — PROGRESS NOTES
This is a recent snapshot of the patient's [facility] medical record.  For current drug dose and complete information and questions, call [phone] or In Basket pool, fv home infusion ([id number])  CSN Number:  [id number]

## 2019-06-07 NOTE — TELEPHONE ENCOUNTER
Requested Prescriptions   Pending Prescriptions Disp Refills     LORazepam (ATIVAN) 1 MG tablet 50 tablet 0     Si-2 mg as needed for anxiety with TPN and medications       There is no refill protocol information for this order        Last Written Prescription Date:  5/3/2019  Last Fill Quantity: 50,  # refills: 0   Last office visit: 4/10/2019 with prescribing provider:     Future Office Visit:   Next 5 appointments (look out 90 days)    2019  1:45 PM CDT  Return Visit with Patti Milligan MD  [facility] ([facility]) [address] [place] [id number]  [phone]   2019  1:00 PM CDT  [facility] [address] [place] [id number] [phone]         Routing refill request to provider for review/approval because:  Drug not on the [facility] refill protocol     Ella Hammond RN

## 2019-06-10 RX ORDER — LORAZEPAM 1 MG/1
TABLET ORAL
Qty: 50 TABLET | Refills: 0 | Status: SHIPPED | OUTPATIENT
Start: 2019-06-10 | End: 2019-07-10

## 2019-06-11 ENCOUNTER — HOME INFUSION (PRE-WILLOW HOME INFUSION) (OUTPATIENT)
Dept: PHARMACY | Facility: CLINIC | Age: 49
End: 2019-06-11

## 2019-06-11 NOTE — TELEPHONE ENCOUNTER
Signed original RX delivered to [facility] to be delivered to [facility] pharmacy . Eva,

## 2019-06-12 ENCOUNTER — OFFICE VISIT (OUTPATIENT)
Dept: PALLIATIVE MEDICINE | Facility: CLINIC | Age: 49
End: 2019-06-12
Payer: COMMERCIAL

## 2019-06-12 VITALS
WEIGHT: 200 LBS | HEART RATE: 108 BPM | DIASTOLIC BLOOD PRESSURE: 78 MMHG | BODY MASS INDEX: 27.89 KG/M2 | SYSTOLIC BLOOD PRESSURE: 129 MMHG

## 2019-06-12 DIAGNOSIS — G89.29 CHRONIC ABDOMINAL PAIN: Primary | ICD-10-CM

## 2019-06-12 DIAGNOSIS — K59.03 DRUG-INDUCED CONSTIPATION: ICD-10-CM

## 2019-06-12 DIAGNOSIS — G89.4 CHRONIC PAIN SYNDROME: ICD-10-CM

## 2019-06-12 DIAGNOSIS — R10.9 CHRONIC ABDOMINAL PAIN: Primary | ICD-10-CM

## 2019-06-12 PROCEDURE — 99213 OFFICE O/P EST LOW 20 MIN: CPT | Performed by: PSYCHIATRY & NEUROLOGY

## 2019-06-12 ASSESSMENT — PAIN SCALES - GENERAL: PAINLEVEL: SEVERE PAIN (6)

## 2019-06-12 NOTE — PATIENT INSTRUCTIONS
----------------------------------------------------------------  Clinic Number:  [phone]   Call this number with any questions about your care and for scheduling assistance. Calls are returned Monday through Friday between 8 AM and 4:30 PM. We usually get back to you within 2 business days depending on the issue/request.       Medication refills:    For non-opioid medications, call your pharmacy directly to request a refill. The pharmacy will contact the Pain Management Center for authorization. Please allow 3-4 days for these refills to be processed.     For opioid refills, call the clinic number or send a MapMyID message. Please contact us 7-10 days before your refill is due. The message MUST include the name of the specific medication(s) requested and how you would like to receive the prescription(s). The options are as follows:    Pain Clinic staff can mail the prescription to your pharmacy. Please tell us the name of the pharmacy.    You may pick the prescription up at the Pain Clinic (tell us the location) or during a clinic visit with your pain provider    Pain Clinic staff can deliver the prescription to the [facility] pharmacy in the clinic building. Please tell us the location.      We believe regular attendance is key to your success in our program.    Any time you are unable to keep your appointment we ask that you call us at least 24 hours in advance to let us know. This will allow us to offer the appointment time to another patient.

## 2019-06-12 NOTE — PROGRESS NOTES
This is a recent snapshot of the patient's [facility] medical record.  For current drug dose and complete information and questions, call [phone] or In [facility] pool, fv home infusion ([id number])  CSN Number:  [id number]

## 2019-06-17 ENCOUNTER — TELEPHONE (OUTPATIENT)
Dept: INFECTIOUS DISEASES | Facility: CLINIC | Age: 49
End: 2019-06-17

## 2019-06-19 ENCOUNTER — MYC REFILL (OUTPATIENT)
Dept: FAMILY MEDICINE | Facility: CLINIC | Age: 49
End: 2019-06-19

## 2019-06-19 ENCOUNTER — MYC MEDICAL ADVICE (OUTPATIENT)
Dept: PALLIATIVE MEDICINE | Facility: CLINIC | Age: 49
End: 2019-06-19

## 2019-06-19 ENCOUNTER — MYC REFILL (OUTPATIENT)
Dept: PALLIATIVE MEDICINE | Facility: CLINIC | Age: 49
End: 2019-06-19

## 2019-06-19 DIAGNOSIS — G89.29 CHRONIC ABDOMINAL PAIN: ICD-10-CM

## 2019-06-19 DIAGNOSIS — R10.9 CHRONIC ABDOMINAL PAIN: ICD-10-CM

## 2019-06-19 DIAGNOSIS — Z79.891 ENCOUNTER FOR LONG-TERM OPIATE ANALGESIC USE: ICD-10-CM

## 2019-06-19 DIAGNOSIS — E53.8 VITAMIN B12 DEFICIENCY (NON ANEMIC): ICD-10-CM

## 2019-06-19 RX ORDER — OXYCODONE HCL 5 MG/5 ML
10-15 SOLUTION, ORAL ORAL EVERY 4 HOURS PRN
Qty: 1800 ML | Refills: 0 | Status: CANCELLED | OUTPATIENT
Start: 2019-06-19

## 2019-06-19 NOTE — TELEPHONE ENCOUNTER
Stoney message from patient on 6/19 at 1117:    I need to order my oxycodone to be picked up on the 28th of June at the [facility] pharmacy any questions feel free to call us at 7  Thank you so much   --------------    Will route to MA pool for assistance with gathering opioid refill information.    YADIRA CrookN, RN-BC  Patient Care Supervisor/Care Coordinator  [facility]

## 2019-06-20 NOTE — TELEPHONE ENCOUNTER
Patient requesting refill(s) of oxyCODONE (ROXICODONE) 5 MG/5ML solution     Last picked up from pharmacy on 5/30/19    Pt last seen by prescribing provider on 6/12/19  Next appt scheduled for 9/11/19     checked in the past 6 months? Yes If no, print current report and give to RN    Last urine drug screen date 10/29/18  Current opioid agreement on file (completed within the last year) Yes Date of opioid agreement: 10/29/18    Processing (pick one and delete the others):      E-prescribe to [facility] [place], [place]     Will route to nursing pool for review and preparation of prescription(s).

## 2019-06-20 NOTE — TELEPHONE ENCOUNTER
Duplicate request. Will close this call.     YADIRA CrookN, RN-BC  Patient Care Supervisor/Care Coordinator  [facility]

## 2019-06-20 NOTE — TELEPHONE ENCOUNTER
Medication refill information reviewed.     Due date for oxycodone 5 mg soln is 7/1/19     Prescription prepped for review.     Will route to provider.     Message sent to pt:    Esther Canales,     We received 2 messages from you on the same day for the refill request of this medication. In the future, please only send 1 request in order to avoid confusion. We will let you know when the prescription has been processed.     Thank you,     YADIRA CrookN, RN-BC  Patient Care Supervisor/Care Coordinator  [facility]

## 2019-06-20 NOTE — PROGRESS NOTES
"[facility]    Date of visit: 6/12/19    Chief complaint:    Chief Complaint   Patient presents with     Pain       Interval history:  Parker Acevedo was last seen by me on 3/13/2019     Recommendations/plan at the last visit included:  1. Physical therapy for neck and back pain   2. Refills provided today- allowed early refills for travel- he hasn't requested this before.  3. Advised more regular BMs and use of miralax- refill provided  4. We again discussed smoking cessation and effects on his overall health, including infections  5. Follow up in 3 months.    Since his last visit, Parker Acevedo reports:  -no significant changes.  -was hospitalized since last visit  -meds continue to work.  -he continues to wonder if fentanyl worked better for him.    Pain scores:  Average pain intensity  6/10    Current pain treatments:   Oxycodone 10-15 mg liquid by G tube QID hours, total of 60 mg daily (MEDD 82.5 mg)  Lidocaine patches  Naloxone- has from previous hospitalization.    Previous medication treatments included:   Valium 5 mg/day, 1 tab in AM and PM-stopped in 11/2017  Tylenol #3   Vicodin   Tramadol   Diazepam- for sleep/anxiety  Gabapentin- bad headaches, acid reflux  Oxycodone max of 45mg/day.    Fentanyl 25mcg/hr patch q48 hours - down from 50mcg/hr    Side Effects: no side effects    Medications:  Current Outpatient Medications   Medication Sig Dispense Refill     acetaminophen (TYLENOL) 32 mg/mL liquid Take 500 mg by mouth every 4 hours as needed for fever or mild pain       albuterol (PROAIR HFA/PROVENTIL HFA/VENTOLIN HFA) 108 (90 Base) MCG/ACT Inhaler Inhale 2 puffs into the lungs every 4 hours as needed for shortness of breath / dyspnea or wheezing 1 Inhaler 3     amphetamine-dextroamphetamine (ADDERALL) 20 MG tablet Take 1 tablet (20 mg) by mouth daily 30 tablet 0     B-D TB SYRINGE 27G X 1/2\" 1 ML MISC        blood glucose (NO BRAND SPECIFIED) lancets " "standard Use to test blood sugar 1 times daily or as directed. 100 each 1     blood glucose (NO BRAND SPECIFIED) test strip Use to test blood sugar 1 times daily or as directed. 100 strip 3     blood glucose monitoring (NO BRAND SPECIFIED) meter device kit Use to test blood sugar 1 times daily or as directed. 1 kit 0     CARAFATE 1 GM/10ML suspension   2     carvedilol (COREG) 6.25 MG tablet Take 6.25 mg by mouth 2 times daily (with meals)       diphenhydrAMINE (BENADRYL) 12.5 MG/5ML solution Take 10 mLs (25 mg) by mouth 3 times daily as needed for itching (Prior to vanco administration) 480 mL 0     [facility] HOME INFUSION MANAGED PATIENT Contact [facility] for patient specific medication information at [phone] on admission and discharge from the hospital.  Phones are answered 24 hours a day 7 days a week 365 days a year.    Providers - Choose \"CONTINUE HOME MED (no script)\" at discharge if patient treatment with home infusion will continue.       lidocaine (LIDODERM) 5 % Patch Place 1-2 patches onto the skin every 24 hours Wear for 12 hours, remove for 12 hours.  OK to cut to better fit to size. 60 patch 6     LORazepam (ATIVAN) 1 MG tablet 1-2 mg as needed for anxiety with TPN and medications 50 tablet 0     magic mouthwash (FIRST-MOUTHWASH BLM) compounding kit Swish and swallow 10 mLs in mouth every 6 hours as needed for mouth sores 237 mL 0     naloxone (NARCAN) 4 MG/0.1ML nasal spray Spray 4 mg into one nostril alternating nostrils once as needed every 2-3 minutes until assistance arrives       Needle, Disp, (BD DISP NEEDLES) 27G X 1/2\" MISC 1 Device every 30 days Use for cyanocobalamin injection once q 30 days. 3 each 4     ondansetron (ZOFRAN-ODT) 8 MG ODT tab DISSOLVE ONE TABLET ON TONGUE EVERY 8 HOURS AS NEEDED FOR NAUSEA 90 tablet 1     order for DME Equipment being ordered: Bilateral knee high chronic venous insufficiency stockings--  mild-moderate pressures. 4 each 5     order for DME " Injection Supplies for Vitamin B12: 3cc syringes w/ 27 gauge needles, 1/2 inch length 12 each 0     oxyCODONE (ROXICODONE) 5 MG/5ML solution Take 10-15 mLs (10-15 mg) by mouth every 4 hours as needed for pain Max 60 mg/day. Fill 5/30/19 to start on 6/1/19. 1800 mL 0     polyethylene glycol (MIRALAX/GLYCOLAX) powder Take 17 g (1 capful) by mouth daily 578 g 11     sodium chloride 0.9% infusion Inject 1,000-2,000 mLs into the vein as needed        UNABLE TO FIND States takes TPN 2050 ml  Every 14 hours throught PICC       vancomycin 1,250 mg Inject 1,250 mg into the vein every 8 hours Infuse over 2 hours 42 each 0     vitamin B-12 (CYANOCOBALAMIN) 1000 MCG/ML injection Inject 1 mL (1,000 mcg) into the muscle every 30 days 1 mL 11     vitamin D2 (ERGOCALCIFEROL) 81812 units (1250 mcg) capsule Take 1 capsule (50,000 Units) by mouth once a week 12 capsule 3       Medical History: any changes in medical history since they were last seen? hospitalization    Review of Systems:  The 14 system ROS was reviewed from the intake questionnaire, and is positive for: diarrhea, palpitations, f.c  Any bowel or bladder problems:now prolonged time between BM- more regular BMs than previous visit  Mood: anxiety    Physical Exam:  Blood pressure 129/78, pulse 108, weight 90.7 kg (200 lb).  General: awake, alert  Gait: Slow  MSK exam: forward flexed posture with sitting  No drainage from G tube       THE 4 A's OF OPIOID MAINTENANCE ANALGESIA    Analgesia: good, as noted in interval history    Activity: on disability    Adverse effects: none    Adherence to Rx protocol: good- [place] Prescription Monitoring Program checked 6/20/19appropriate  Last UDS and opioid agreement 10/29/18    Assessment:   1. Chronic abdominal pain, with history of gastric bypass and later peptic ulcer   2. G tube placement- only used for venting  3. Myofascial abdominal pain, with significant guarding and poor posture  4. Opioid tolerance  5. Anxiety  6. Foot pain with  tendonous injury- healed  7. Left arm weakness, consistent with radial nerve injury- improving  8. Port placement- h/o recurrent infections      Plan:   1. Physical therapy for neck and back pain   2. No changes to meds. I do not recommend going back on the fentanyl at this point, as he regularly had problems during the summer months.  He previously has felt that the oxycodone worked better  3. Continue meds for constipation.  4. We again discussed smoking cessation and effects on his overall health  5. Follow up in 3 months.    Total time spent was 20 minutes, and more than 50% of face to face time was spent in counseling and/or coordination of care regarding physical therapy, medications.     Jessica Milligan MD  [facility]

## 2019-06-21 RX ORDER — OXYCODONE HCL 5 MG/5 ML
10-15 SOLUTION, ORAL ORAL EVERY 4 HOURS PRN
Qty: 1800 ML | Refills: 0 | Status: SHIPPED | OUTPATIENT
Start: 2019-06-21 | End: 2019-07-17

## 2019-06-21 NOTE — TELEPHONE ENCOUNTER
Script Eprescribed to pharmacy    Will send this to MA team to notify patient. Please remind him of start date    Signed Prescriptions:                        Disp   Refills    oxyCODONE (ROXICODONE) 5 MG/5ML solution   1800 mL0        Sig: Take 10-15 mLs (10-15 mg) by mouth every 4 hours as           needed for pain Max 60 mg/day. Fill 6/28/19 to           start on 7/1/19.  Authorizing Provider: BRANDYN JENKINS MD  [facility]

## 2019-06-24 RX ORDER — CYANOCOBALAMIN 1000 UG/ML
1 INJECTION, SOLUTION INTRAMUSCULAR; SUBCUTANEOUS
Qty: 1 ML | Refills: 11 | OUTPATIENT
Start: 2019-06-24

## 2019-06-24 NOTE — TELEPHONE ENCOUNTER
"Requested Prescriptions   Pending Prescriptions Disp Refills     cyanocobalamin (CYANOCOBALAMIN) 1000 MCG/ML injection 1 mL 11     Sig: Inject 1 mL (1,000 mcg) into the muscle every 30 days   Last Written Prescription Date:  2/8/2019  Last Fill Quantity: 1 ml,  # refills: 11   Last office visit: 4/10/2019 with prescribing provider:  Poncho   Future Office Visit:   Next 5 appointments (look out 90 days)    Jul 10, 2019  9:00 AM CDT  [facility] Visit with [facility]uy [facility] MD  [facility] [address] [place] [id number]  [phone]   Sep 11, 2019  1:45 PM CDT  Return Visit with Patti Milligan MD  [facility] ([facility]) [address] [place] [id number]  [phone]             Vitamin Supplements (Adult) Protocol Passed - 6/19/2019 12:13 PM        Passed - High dose Vitamin D not ordered        Passed - Recent (12 mo) or future (30 days) visit within the authorizing provider's specialty     Patient had office visit in the last 12 months or has a visit in the next 30 days with authorizing provider or within the authorizing provider's specialty.  See \"Patient Info\" tab in inbasket, or \"Choose Columns\" in Meds & Orders section of the refill encounter.              Passed - Medication is active on med list      Denied  Refills current  Ella Hammond RN      "

## 2019-06-25 ENCOUNTER — HOSPITAL ENCOUNTER (OUTPATIENT)
Dept: LAB | Facility: CLINIC | Age: 49
End: 2019-06-25
Attending: SURGERY
Payer: COMMERCIAL

## 2019-06-26 ENCOUNTER — HOME INFUSION (PRE-WILLOW HOME INFUSION) (OUTPATIENT)
Dept: PHARMACY | Facility: CLINIC | Age: 49
End: 2019-06-26

## 2019-06-27 ENCOUNTER — PATIENT OUTREACH (OUTPATIENT)
Dept: CARE COORDINATION | Facility: CLINIC | Age: 49
End: 2019-06-27

## 2019-06-27 ASSESSMENT — ACTIVITIES OF DAILY LIVING (ADL): DEPENDENT_IADLS:: MEDICATION MANAGEMENT;TRANSPORTATION;SHOPPING

## 2019-06-27 NOTE — PROGRESS NOTES
Clinic Care Coordination Contact    Clinic Care Coordination Contact  OUTREACH    Referral Information:  Referral Source: IP Report    Primary Diagnosis: SIRS/Sepsis    Chief Complaint   Patient presents with     Clinic Care Coordination - Follow-up     RN        Universal Utilization: Patient is followed Infectious Disease, Pain Management and Bariatric Surgery  Clinic Utilization  Difficulty keeping appointments:: No  Compliance Concerns: Yes  No-Show Concerns: No  No PCP office visit in Past Year: No  Utilization    Last refreshed: 6/24/2019 11:23 PM:  Hospital Admissions 3           Last refreshed: 6/24/2019 11:23 PM:  ED Visits 5           Last refreshed: 6/24/2019 11:23 PM:  No Show Count (past year) 14              Current as of: 6/24/2019 11:23 PM              Clinical Concerns:  Current Medical Concerns:  RN CC spoke with patient's spouse Rose (consent to communicate on file).  Noted patient did not make his infectious disease appointment on 6/18/19.  Rose states she was informed that this follow up appointment was not needed.  Rose states she will be communicating with infectious disease and Dr. Vyas about changing patient's PICC line to a Cm.    Rose states patient has been have intermittent fevers, last temperature 100.9 yesterday.  Patient has a follow up scheduled with PCP for 7/10/19.  Patient's last PCP appointment for 6/19/19 was cancelled.  Patient has been receiving infusion nurse visits from [facility] on a weekly basis.      Current Behavioral Concerns: None at this time.      Education Provided to patient: RN CC reviewed the importance of clinic follow up.     Pain  Pain (GOAL):: Yes  Type: Chronic (>3mo)  Location of chronic pain:: chronic abdominal pain and lower back pain  Limitation of routine activities due to chronic pain:: Yes  Description: Unable to perform most daily activities (chores, hobbies, social activities, driving)  Alleviating Factors: Pain  Medication, Rest, Heat  Health Maintenance Reviewed: Due/Overdue   Health Maintenance Due   Topic Date Due     DTAP/TDAP/TD IMMUNIZATION (9 - Td) 01/23/2019     TOBACCO CESSATION COUNSELING  02/06/2019      Clinical Pathway: None    Medication Management:  Spouse assists with medication management.     Functional Status:  Dependent ADLs:: Ambulation-cane, Bathing  Dependent IADLs:: Medication Management, Transportation, Shopping  Bed or wheelchair confined:: No  Mobility Status: Independent    Living Situation:  Current living arrangement:: I live in a private home with spouse  Type of residence:: Private home - stairs    Diet/Exercise/Sleep:  Diet:: Regular(But also has TPN)  Inadequate nutrition (GOAL):: No  Food Insecurity: No  Tube Feeding: No  Exercise:: Currently not exercising  Inadequate activity/exercise (GOAL):: No  Significant changes in sleep pattern (GOAL): No    Transportation:  Transportation concerns (GOAL):: No  Transportation means:: Regular car     Psychosocial:  Anglican or spiritual beliefs that impact treatment:: No  Mental health DX:: Yes  Mental health DX how managed:: Medication  Mental health management concern (GOAL):: No  Informal Support system:: Family, Spouse     Financial/Insurance:   Financial/Insurance concerns (GOAL):: No       Resources and Interventions:  Current Resources:   List of home care services:: Skilled Nursing;   Community Resources: Home Infusion, Home Care  Supplies used at home:: None  Equipment Currently Used at Home: cane, straight, shower chair    Advance Care Plan/Directive  Advanced Care Plans/Directives on file:: Yes  Type Advanced Care Plans/Directives: Advanced Directive - On File  Advanced Care Plan/Directive Status: Not Applicable    Referrals Placed: None     Goals:   Goals        General    #1 Medical (pt-stated)     Notes - Note edited  6/27/2019  5:08 PM by Behl, Melissa K, RN    Goal Statement: Spouse will schedule follow up with PCP for hospital  follow up.  Measure of Success: PCP appointment will be scheduled and attended.  Supportive Steps to Achieve: Reviewed hospital discharge instructions.  Barriers: potential schedule conflict  Strengths: supportive spouse, care coordiation  Date to Achieve By: 7/10/19  Patient expressed understanding of goal: yes                 Patient/Caregiver understanding: Spouse has fair understanding of current plan of care, but patient and spouse have had barrier with follow through in the past.    Outreach Frequency: monthly  Future Appointments              In 1 week Chuy Isaac MD [facility]    In 2 months Patti Milligan MD [facility], [facility]          Plan:   1. Patient will follow up with PCP as scheduled 7/10/19.  2. Spouse will contact infectious disease and Dr. Vyas regarding Cm placement.  3. RN CC will follow up with patient in 3-4 weeks.    Melissa Behl BSN, RN, PHN  Primary Care Clinical RN Care Coordinator  [facility]   [phone]

## 2019-06-27 NOTE — PROGRESS NOTES
This is a recent snapshot of the patient's [facility] medical record.  For current drug dose and complete information and questions, call [phone] or In [facility] pool, fv home infusion ([id number])  CSN Number:  [id number]

## 2019-06-28 ENCOUNTER — HOME INFUSION (PRE-WILLOW HOME INFUSION) (OUTPATIENT)
Dept: PHARMACY | Facility: CLINIC | Age: 49
End: 2019-06-28

## 2019-06-28 ENCOUNTER — MEDICAL CORRESPONDENCE (OUTPATIENT)
Dept: PHARMACY | Facility: CLINIC | Age: 49
End: 2019-06-28

## 2019-06-28 ENCOUNTER — HOSPITAL ENCOUNTER (OUTPATIENT)
Dept: LAB | Facility: CLINIC | Age: 49
Discharge: HOME | End: 2019-06-28
Attending: INTERNAL MEDICINE | Admitting: INTERNAL MEDICINE
Payer: COMMERCIAL

## 2019-06-28 LAB
ALBUMIN SERPL-MCNC: 3.5 G/DL (ref 3.4–5)
ALP SERPL-CCNC: 102 U/L (ref 40–150)
ALT SERPL W P-5'-P-CCNC: 31 U/L (ref 0–70)
ANION GAP SERPL CALCULATED.3IONS-SCNC: 6 MMOL/L (ref 3–14)
AST SERPL W P-5'-P-CCNC: 18 U/L (ref 0–45)
BASOPHILS # BLD AUTO: 0.1 10E9/L (ref 0–0.2)
BASOPHILS NFR BLD AUTO: 0.5 %
BILIRUB DIRECT SERPL-MCNC: <0.1 MG/DL (ref 0–0.2)
BILIRUB SERPL-MCNC: 0.2 MG/DL (ref 0.2–1.3)
BUN SERPL-MCNC: 11 MG/DL (ref 7–30)
CALCIUM SERPL-MCNC: 8.3 MG/DL (ref 8.5–10.1)
CHLORIDE SERPL-SCNC: 107 MMOL/L (ref 94–109)
CO2 SERPL-SCNC: 29 MMOL/L (ref 20–32)
CREAT SERPL-MCNC: 0.73 MG/DL (ref 0.66–1.25)
CRP SERPL-MCNC: <2.9 MG/L (ref 0–8)
DIFFERENTIAL METHOD BLD: ABNORMAL
EOSINOPHIL NFR BLD AUTO: 2.5 %
ERYTHROCYTE [DISTWIDTH] IN BLOOD BY AUTOMATED COUNT: 13.4 % (ref 10–15)
FERRITIN SERPL-MCNC: 12 NG/ML (ref 26–388)
GFR SERPL CREATININE-BSD FRML MDRD: >90 ML/MIN/{1.73_M2}
GLUCOSE SERPL-MCNC: 91 MG/DL (ref 70–99)
HCT VFR BLD AUTO: 42.2 % (ref 40–53)
HGB BLD-MCNC: 13.6 G/DL (ref 13.3–17.7)
IMM GRANULOCYTES # BLD: 0.1 10E9/L (ref 0–0.4)
IMM GRANULOCYTES NFR BLD: 0.5 %
LYMPHOCYTES # BLD AUTO: 2.8 10E9/L (ref 0.8–5.3)
LYMPHOCYTES NFR BLD AUTO: 25.8 %
MAGNESIUM SERPL-MCNC: 2.3 MG/DL (ref 1.6–2.3)
MCH RBC QN AUTO: 31.1 PG (ref 26.5–33)
MCHC RBC AUTO-ENTMCNC: 32.2 G/DL (ref 31.5–36.5)
MCV RBC AUTO: 96 FL (ref 78–100)
MONOCYTES # BLD AUTO: 0.8 10E9/L (ref 0–1.3)
MONOCYTES NFR BLD AUTO: 7.4 %
NEUTROPHILS # BLD AUTO: 7 10E9/L (ref 1.6–8.3)
NEUTROPHILS NFR BLD AUTO: 63.3 %
NRBC # BLD AUTO: 0 10*3/UL
NRBC BLD AUTO-RTO: 0 /100
PHOSPHATE SERPL-MCNC: 3.9 MG/DL (ref 2.5–4.5)
PLATELET # BLD AUTO: 178 10E9/L (ref 150–450)
POTASSIUM SERPL-SCNC: 3.8 MMOL/L (ref 3.4–5.3)
PROT SERPL-MCNC: 7 G/DL (ref 6.8–8.8)
RBC # BLD AUTO: 4.38 10E12/L (ref 4.4–5.9)
SODIUM SERPL-SCNC: 142 MMOL/L (ref 133–144)
TRIGL SERPL-MCNC: 115 MG/DL
WBC # BLD AUTO: 11 10E9/L (ref 4–11)

## 2019-06-28 PROCEDURE — 82248 BILIRUBIN DIRECT: CPT | Performed by: INTERNAL MEDICINE

## 2019-06-28 PROCEDURE — 86140 C-REACTIVE PROTEIN: CPT | Performed by: INTERNAL MEDICINE

## 2019-06-28 PROCEDURE — 80053 COMPREHEN METABOLIC PANEL: CPT | Performed by: INTERNAL MEDICINE

## 2019-06-28 PROCEDURE — 83735 ASSAY OF MAGNESIUM: CPT | Performed by: INTERNAL MEDICINE

## 2019-06-28 PROCEDURE — 84478 ASSAY OF TRIGLYCERIDES: CPT | Performed by: INTERNAL MEDICINE

## 2019-06-28 PROCEDURE — 85025 COMPLETE CBC W/AUTO DIFF WBC: CPT | Performed by: INTERNAL MEDICINE

## 2019-06-28 PROCEDURE — 82728 ASSAY OF FERRITIN: CPT | Performed by: INTERNAL MEDICINE

## 2019-06-28 PROCEDURE — 84100 ASSAY OF PHOSPHORUS: CPT | Performed by: INTERNAL MEDICINE

## 2019-07-01 NOTE — PROGRESS NOTES
This is a recent snapshot of the patient's [facility] medical record.  For current drug dose and complete information and questions, call [phone] or In [facility] pool, fv home infusion ([id number])  CSN Number:  [id number]

## 2019-07-02 ENCOUNTER — TELEPHONE (OUTPATIENT)
Dept: INTERNAL MEDICINE | Facility: CLINIC | Age: 49
End: 2019-07-02

## 2019-07-02 ENCOUNTER — MYC MEDICAL ADVICE (OUTPATIENT)
Dept: INTERNAL MEDICINE | Facility: CLINIC | Age: 49
End: 2019-07-02

## 2019-07-02 DIAGNOSIS — Z98.84 S/P BARIATRIC SURGERY: Primary | ICD-10-CM

## 2019-07-02 DIAGNOSIS — R79.0 LOW FERRITIN: ICD-10-CM

## 2019-07-02 DIAGNOSIS — Z53.9 DIAGNOSIS NOT YET DEFINED: Primary | ICD-10-CM

## 2019-07-02 PROCEDURE — G0180 MD CERTIFICATION HHA PATIENT: HCPCS | Performed by: INTERNAL MEDICINE

## 2019-07-02 NOTE — TELEPHONE ENCOUNTER
Medication reconciliation completed by RN. Form and chart forwarded to PCP for signatures.    [place] RN

## 2019-07-03 ENCOUNTER — HOME INFUSION (PRE-WILLOW HOME INFUSION) (OUTPATIENT)
Dept: PHARMACY | Facility: CLINIC | Age: 49
End: 2019-07-03

## 2019-07-09 ENCOUNTER — TELEPHONE (OUTPATIENT)
Dept: ENDOCRINOLOGY | Facility: CLINIC | Age: 49
End: 2019-07-09

## 2019-07-09 NOTE — TELEPHONE ENCOUNTER
"Called and spoke with Parker's wife regarding her request for a Cm. Parker has had multiple hospital stays for infection and is closely followed by the infectious disease doctor. His wife reports problems with the PICC line clogging off and \"bleeding back into the line.\" I told his wife that I would discuss these issues with Dr. Vyas in clinic on Thursday 07/11/2019 and come up with a plan to either exchange the PICC line for a valved or possible placement of Cm. Parker's wife is okay with this plan and I told her I would be in touch ;late Thursday or erarly Friday with this plan.  "

## 2019-07-10 ENCOUNTER — INFUSION THERAPY VISIT (OUTPATIENT)
Dept: INFUSION THERAPY | Facility: CLINIC | Age: 49
End: 2019-07-10
Attending: INTERNAL MEDICINE
Payer: COMMERCIAL

## 2019-07-10 ENCOUNTER — OFFICE VISIT (OUTPATIENT)
Dept: SURGERY | Facility: CLINIC | Age: 49
End: 2019-07-10
Payer: COMMERCIAL

## 2019-07-10 ENCOUNTER — OFFICE VISIT (OUTPATIENT)
Dept: INTERNAL MEDICINE | Facility: CLINIC | Age: 49
End: 2019-07-10
Payer: COMMERCIAL

## 2019-07-10 VITALS
WEIGHT: 210 LBS | BODY MASS INDEX: 29.4 KG/M2 | DIASTOLIC BLOOD PRESSURE: 72 MMHG | SYSTOLIC BLOOD PRESSURE: 126 MMHG | HEIGHT: 71 IN | TEMPERATURE: 99 F

## 2019-07-10 VITALS
WEIGHT: 210 LBS | RESPIRATION RATE: 16 BRPM | TEMPERATURE: 97.4 F | OXYGEN SATURATION: 99 % | DIASTOLIC BLOOD PRESSURE: 84 MMHG | SYSTOLIC BLOOD PRESSURE: 124 MMHG | BODY MASS INDEX: 29.29 KG/M2 | HEART RATE: 76 BPM

## 2019-07-10 DIAGNOSIS — E86.0 DEHYDRATION: ICD-10-CM

## 2019-07-10 DIAGNOSIS — Z45.2 PICC (PERIPHERALLY INSERTED CENTRAL CATHETER) IN PLACE: ICD-10-CM

## 2019-07-10 DIAGNOSIS — D64.9 ANEMIA: ICD-10-CM

## 2019-07-10 DIAGNOSIS — R79.0 LOW FERRITIN: ICD-10-CM

## 2019-07-10 DIAGNOSIS — K90.89 OTHER SPECIFIED INTESTINAL MALABSORPTION: ICD-10-CM

## 2019-07-10 DIAGNOSIS — F41.9 ANXIETY: ICD-10-CM

## 2019-07-10 DIAGNOSIS — E46 MALNUTRITION, UNSPECIFIED TYPE (H): ICD-10-CM

## 2019-07-10 DIAGNOSIS — Z98.84 S/P BARIATRIC SURGERY: Primary | ICD-10-CM

## 2019-07-10 DIAGNOSIS — E61.1 IRON DEFICIENCY: Primary | ICD-10-CM

## 2019-07-10 DIAGNOSIS — T82.9XXA COMPLICATION ASSOCIATED WITH PERIPHERALLY INSERTED CENTRAL CATHETER, INITIAL ENCOUNTER: Primary | ICD-10-CM

## 2019-07-10 DIAGNOSIS — E44.0 MODERATE PROTEIN-CALORIE MALNUTRITION (H): Primary | ICD-10-CM

## 2019-07-10 DIAGNOSIS — F90.0 ADHD (ATTENTION DEFICIT HYPERACTIVITY DISORDER), INATTENTIVE TYPE: ICD-10-CM

## 2019-07-10 LAB
CRP SERPL-MCNC: <2.9 MG/L (ref 0–8)
ERYTHROCYTE [DISTWIDTH] IN BLOOD BY AUTOMATED COUNT: NORMAL % (ref 10–15)
HCT VFR BLD AUTO: NORMAL % (ref 40–53)
HGB BLD-MCNC: NORMAL G/DL (ref 13.3–17.7)
IRON SATN MFR SERPL: 25 % (ref 15–46)
IRON SERPL-MCNC: 77 UG/DL (ref 35–180)
MCH RBC QN AUTO: NORMAL PG (ref 26.5–33)
MCHC RBC AUTO-ENTMCNC: NORMAL G/DL (ref 31.5–36.5)
MCV RBC AUTO: NORMAL FL (ref 78–100)
PLATELET # BLD AUTO: NORMAL 10E9/L (ref 150–450)
RBC # BLD AUTO: NORMAL 10E12/L (ref 4.4–5.9)
TIBC SERPL-MCNC: 314 UG/DL (ref 240–430)
WBC # BLD AUTO: NORMAL 10E9/L (ref 4–11)

## 2019-07-10 PROCEDURE — 83550 IRON BINDING TEST: CPT | Performed by: INTERNAL MEDICINE

## 2019-07-10 PROCEDURE — 86140 C-REACTIVE PROTEIN: CPT | Performed by: INTERNAL MEDICINE

## 2019-07-10 PROCEDURE — 99214 OFFICE O/P EST MOD 30 MIN: CPT | Performed by: INTERNAL MEDICINE

## 2019-07-10 PROCEDURE — 36592 COLLECT BLOOD FROM PICC: CPT

## 2019-07-10 PROCEDURE — 99201 ZZC OFFICE/OUTPT VISIT, NEW, LEVEL I: CPT | Performed by: SURGERY

## 2019-07-10 PROCEDURE — 83540 ASSAY OF IRON: CPT | Performed by: INTERNAL MEDICINE

## 2019-07-10 PROCEDURE — 87040 BLOOD CULTURE FOR BACTERIA: CPT | Performed by: INTERNAL MEDICINE

## 2019-07-10 RX ORDER — LORAZEPAM 1 MG/1
TABLET ORAL
Qty: 50 TABLET | Refills: 0 | Status: SHIPPED | OUTPATIENT
Start: 2019-07-10 | End: 2019-08-01

## 2019-07-10 RX ORDER — DEXTROAMPHETAMINE SACCHARATE, AMPHETAMINE ASPARTATE, DEXTROAMPHETAMINE SULFATE AND AMPHETAMINE SULFATE 5; 5; 5; 5 MG/1; MG/1; MG/1; MG/1
20 TABLET ORAL DAILY
Qty: 30 TABLET | Refills: 0 | Status: SHIPPED | OUTPATIENT
Start: 2019-07-10 | End: 2019-08-01

## 2019-07-10 ASSESSMENT — MIFFLIN-ST. JEOR: SCORE: 1854.68

## 2019-07-10 ASSESSMENT — PAIN SCALES - GENERAL
PAINLEVEL: NO PAIN (0)
PAINLEVEL: MODERATE PAIN (4)

## 2019-07-10 NOTE — PROGRESS NOTES
I visited with Parker about his history of peripheral and central venous access. He has had multiple multiple ports, hickmans and PICCs over the last 10 years due to malnutrition from complications of gastris bypass. He states that most, if not all of his venous access has been done by interventional radiology. He also states that during the last azevedo, they ran into some issues with placement. Because of his extensive history of venous access I am unable to offer him a solution due to the almost certain difficulties with another central access catheter. I feel as though he would be best served by Interventional radiology at the [facility]. Lab results are pending at this time to determine bacteremia and infection.

## 2019-07-10 NOTE — PROGRESS NOTES
"Subjective     Parker Acevedo Sr. is a 48 year old male who presents to clinic today for the following health issues:    HPI   Chief Complaint   Patient presents with     RECHECK     follow up on health and meds     Feeling a little more tired.  Hgb was ok.      Picc is feeling a little sore and slight redness at the site.  Used tpa to help flush the line yesterday.      Some chest congestion still at times, no fever.    History of Rothia infection on his PICC on May 10th.  He was hospitalized and received a new PICC line then.      Needs refill of Ativan and Adderall.    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)      Chronic abdominal pain      Complication of anesthesia      Difficulty swallowing      Gastric ulcer, unspecified as acute or chronic, without mention of hemorrhage, perforation, or obstruction      Gastro-oesophageal reflux disease      Generalized weakness 1/30/2018     Head injury      Hiatal hernia      Other bladder disorder      Other chronic pain      PONV (postoperative nausea and vomiting)      Severe malnutrition (H)     TPN     Short gut syndrome      Tobacco abuse      Current Outpatient Medications   Medication     acetaminophen (TYLENOL) 32 mg/mL liquid     albuterol (PROAIR HFA/PROVENTIL HFA/VENTOLIN HFA) 108 (90 Base) MCG/ACT Inhaler     amphetamine-dextroamphetamine (ADDERALL) 20 MG tablet     B-D TB SYRINGE 27G X 1/2\" 1 ML MISC     blood glucose (NO BRAND SPECIFIED) lancets standard     blood glucose (NO BRAND SPECIFIED) test strip     blood glucose monitoring (NO BRAND SPECIFIED) meter device kit     CARAFATE 1 GM/10ML suspension     carvedilol (COREG) 6.25 MG tablet     diphenhydrAMINE (BENADRYL) 12.5 MG/5ML solution     [facility] INFUSION MANAGED PATIENT     " "lidocaine (LIDODERM) 5 % Patch     LORazepam (ATIVAN) 1 MG tablet     magic mouthwash (FIRST-MOUTHWASH BLM) compounding kit     Needle, Disp, (BD DISP NEEDLES) 27G X 1/2\" MISC     ondansetron (ZOFRAN-ODT) 8 MG ODT tab     order for DME     order for DME     oxyCODONE (ROXICODONE) 5 MG/5ML solution     polyethylene glycol (MIRALAX/GLYCOLAX) powder     sodium chloride 0.9% infusion     UNABLE TO FIND     vitamin B-12 (CYANOCOBALAMIN) 1000 MCG/ML injection     vitamin D2 (ERGOCALCIFEROL) 27728 units (1250 mcg) capsule     naloxone (NARCAN) 4 MG/0.1ML nasal spray     No current facility-administered medications for this visit.      Social History     Tobacco Use     Smoking status: Current Some Day Smoker     Packs/day: 0.25     Years: 3.00     Pack years: 0.75     Types: Cigarettes     Last attempt to quit: 2018     Years since quittin.9     Smokeless tobacco: Never Used     Tobacco comment: I use an e cig every now and than   Substance Use Topics     Alcohol use: No     Comment: quit      Drug use: No     Review of Systems  Constitutional-No fevers, chills, or weight changes..  ENT-No earpain, sore throat, voice changes or rhinitis.  Cardiac-No chest pain or palpitations.  Respiratory-Cough without sputum.  GI-Nausea.  Skin-right arm redness at PICC site.    Physical Exam  /84   Pulse 76   Temp 97.4  F (36.3  C) (Temporal)   Resp 16   Wt 95.3 kg (210 lb)   SpO2 99%   BMI 29.29 kg/m    General Appearance-healthy, alert, no distress  Right arm PICC has no obvious erythema, slight tender above the PICC insertion site.     ASSESSMENT:    This is a 48-year-old gentleman who is quite complex with a history of bariatric surgery which did not go well he is now malnourished and TPN dependent.  He has had issues with infections of his PICC line most recently in May.  He comes in today without any fevers but having some erythema and soreness at the site of the PICC line.    PICCabnormality that he did " have a clot there yesterday and received TPN with his home care nurse.  With the risk of infection we will check a culture from the line, peripheral culture, CBC, and CRP.  No treatment at this time.  We will refer to general surgery to see about getting a Cm instead of the PICC line.    A history of low iron and some is ago we will check his iron level his ferritin was low most recently but his hemoglobin was stable.    Patient says he is due for a tetanus shot which he has but I would hold off because I do not want to know because any other fevers or abnormalities with the question of an infection.    Anxiety-we will refill his Ativan continue that.    ADHD will refill his Adderall both have been lasting over a month.    Malnutrition the patient has continue on TPN and IV fluids for hydration we really cannot pull his PICC line and stop if it appears infected or if the culture is positive will have to be hospitalized and switch over his IV lines.    Electronically signed by Chuy Isaac MD

## 2019-07-10 NOTE — PROGRESS NOTES
Infusion Nursing Note:  Parker Acevedo Sr. presents today for PICC labs.    Patient seen by provider today: Yes: Dr. Isaac.   present during visit today: Not Applicable.    Note: N/A.    Intravenous Access:  Lab draw site right arm/brachial. Good blood return, flushes easily.  Labs drawn without difficulty.  PICC. Drsg change done yesterday by Home Care nurse per date marked on drsg and per patient.     Treatment Conditions:  Not Applicable.      Post Infusion Assessment:  Blood return noted pre and post blood draw.  Site patent and intact, free from redness, edema. Patient says it is tender.   No evidence of extravasations.       Discharge Plan:   Copy of AVS reviewed with patient and/or family. No upcoming appts scheduled with IVO.  Patient discharged in stable condition accompanied by: S.O.  Departure Mode: Ambulatory.    Pavithra Forte RN

## 2019-07-10 NOTE — LETTER
"    7/10/2019         RE: Parker Acevedo Sr.  [address]  [facility] [address]        Dear Colleague,    Thank you for referring your patient, Parker Acevedo Sr., to the [facility]. Please see a copy of my visit note below.    /72   Temp 99  F (37.2  C) (Temporal)   Ht 1.803 m (5' 11\")   Wt 95.3 kg (210 lb)   BMI 29.29 kg/m     Body mass index is 29.29 kg/m .  5' 11\"  210 lbs 0 oz        Rand Fisher CMA      I visited with Parker about his history of peripheral and central venous access. He has had multiple multiple ports, hickmans and PICCs over the last 10 years due to malnutrition from complications of gastris bypass. He states that most, if not all of his venous access has been done by interventional radiology. He also states that during the last azevedo, they ran into some issues with placement. Because of his extensive history of venous access I am unable to offer him a solution due to the almost certain difficulties with another central access catheter. I feel as though he would be best served by Interventional radiology at the [facility]. Lab results are pending at this time to determine bacteremia and infection.    Again, thank you for allowing me to participate in the care of your patient.        Sincerely,        Willian Pantoja, DO    "

## 2019-07-10 NOTE — PROGRESS NOTES
"/72   Temp 99  F (37.2  C) (Temporal)   Ht 1.803 m (5' 11\")   Wt 95.3 kg (210 lb)   BMI 29.29 kg/m    Body mass index is 29.29 kg/m .  5' 11\"  210 lbs 0 gaurav Fisher CMA    "

## 2019-07-11 DIAGNOSIS — T82.9XXA COMPLICATION ASSOCIATED WITH PERIPHERALLY INSERTED CENTRAL CATHETER, INITIAL ENCOUNTER: Primary | ICD-10-CM

## 2019-07-16 ENCOUNTER — HOSPITAL ENCOUNTER (OUTPATIENT)
Dept: LAB | Facility: CLINIC | Age: 49
Discharge: HOME | End: 2019-07-16
Attending: SURGERY | Admitting: SURGERY
Payer: COMMERCIAL

## 2019-07-16 ENCOUNTER — HOME INFUSION (PRE-WILLOW HOME INFUSION) (OUTPATIENT)
Dept: PHARMACY | Facility: CLINIC | Age: 49
End: 2019-07-16

## 2019-07-16 ENCOUNTER — MEDICAL CORRESPONDENCE (OUTPATIENT)
Dept: HEALTH INFORMATION MANAGEMENT | Facility: CLINIC | Age: 49
End: 2019-07-16

## 2019-07-16 LAB
ANION GAP SERPL CALCULATED.3IONS-SCNC: 12 MMOL/L (ref 3–14)
BACTERIA SPEC CULT: NO GROWTH
BACTERIA SPEC CULT: NO GROWTH
BASOPHILS # BLD AUTO: 0 10E9/L (ref 0–0.2)
BASOPHILS NFR BLD AUTO: 0.5 %
BILIRUB DIRECT SERPL-MCNC: 0.1 MG/DL (ref 0–0.2)
BILIRUB SERPL-MCNC: 0.4 MG/DL (ref 0.2–1.3)
BUN SERPL-MCNC: 10 MG/DL (ref 7–30)
CALCIUM SERPL-MCNC: 8.4 MG/DL (ref 8.5–10.1)
CHLORIDE SERPL-SCNC: 109 MMOL/L (ref 94–109)
CO2 SERPL-SCNC: 26 MMOL/L (ref 20–32)
CREAT SERPL-MCNC: 0.51 MG/DL (ref 0.66–1.25)
DIFFERENTIAL METHOD BLD: ABNORMAL
EOSINOPHIL NFR BLD AUTO: 1.7 %
ERYTHROCYTE [DISTWIDTH] IN BLOOD BY AUTOMATED COUNT: 13.2 % (ref 10–15)
GFR SERPL CREATININE-BSD FRML MDRD: >90 ML/MIN/{1.73_M2}
GLUCOSE SERPL-MCNC: 143 MG/DL (ref 70–99)
HCT VFR BLD AUTO: 37.2 % (ref 40–53)
HGB BLD-MCNC: 11.9 G/DL (ref 13.3–17.7)
IMM GRANULOCYTES # BLD: 0 10E9/L (ref 0–0.4)
IMM GRANULOCYTES NFR BLD: 0.2 %
LYMPHOCYTES # BLD AUTO: 1.1 10E9/L (ref 0.8–5.3)
LYMPHOCYTES NFR BLD AUTO: 19.8 %
Lab: NORMAL
Lab: NORMAL
MAGNESIUM SERPL-MCNC: 2 MG/DL (ref 1.6–2.3)
MCH RBC QN AUTO: 31.2 PG (ref 26.5–33)
MCHC RBC AUTO-ENTMCNC: 32 G/DL (ref 31.5–36.5)
MCV RBC AUTO: 98 FL (ref 78–100)
MONOCYTES # BLD AUTO: 0.5 10E9/L (ref 0–1.3)
MONOCYTES NFR BLD AUTO: 9.4 %
NEUTROPHILS # BLD AUTO: 3.9 10E9/L (ref 1.6–8.3)
NEUTROPHILS NFR BLD AUTO: 68.4 %
NRBC # BLD AUTO: 0 10*3/UL
NRBC BLD AUTO-RTO: 0 /100
PHOSPHATE SERPL-MCNC: 2.5 MG/DL (ref 2.5–4.5)
PLATELET # BLD AUTO: 166 10E9/L (ref 150–450)
POTASSIUM SERPL-SCNC: 3.7 MMOL/L (ref 3.4–5.3)
RBC # BLD AUTO: 3.81 10E12/L (ref 4.4–5.9)
SODIUM SERPL-SCNC: 147 MMOL/L (ref 133–144)
SPECIMEN SOURCE: NORMAL
SPECIMEN SOURCE: NORMAL
TRIGL SERPL-MCNC: 37 MG/DL
WBC # BLD AUTO: 5.8 10E9/L (ref 4–11)

## 2019-07-16 PROCEDURE — 84100 ASSAY OF PHOSPHORUS: CPT | Performed by: SURGERY

## 2019-07-16 PROCEDURE — 82247 BILIRUBIN TOTAL: CPT | Performed by: SURGERY

## 2019-07-16 PROCEDURE — 84478 ASSAY OF TRIGLYCERIDES: CPT | Performed by: SURGERY

## 2019-07-16 PROCEDURE — 85025 COMPLETE CBC W/AUTO DIFF WBC: CPT | Performed by: SURGERY

## 2019-07-16 PROCEDURE — 80048 BASIC METABOLIC PNL TOTAL CA: CPT | Performed by: SURGERY

## 2019-07-16 PROCEDURE — 82248 BILIRUBIN DIRECT: CPT | Performed by: SURGERY

## 2019-07-16 PROCEDURE — 83735 ASSAY OF MAGNESIUM: CPT | Performed by: SURGERY

## 2019-07-17 ENCOUNTER — MYC REFILL (OUTPATIENT)
Dept: PALLIATIVE MEDICINE | Facility: CLINIC | Age: 49
End: 2019-07-17

## 2019-07-17 ENCOUNTER — HOME INFUSION (PRE-WILLOW HOME INFUSION) (OUTPATIENT)
Dept: PHARMACY | Facility: CLINIC | Age: 49
End: 2019-07-17

## 2019-07-17 DIAGNOSIS — R10.9 CHRONIC ABDOMINAL PAIN: ICD-10-CM

## 2019-07-17 DIAGNOSIS — Z79.891 ENCOUNTER FOR LONG-TERM OPIATE ANALGESIC USE: ICD-10-CM

## 2019-07-17 DIAGNOSIS — G89.29 CHRONIC ABDOMINAL PAIN: ICD-10-CM

## 2019-07-17 RX ORDER — OXYCODONE HCL 5 MG/5 ML
10-15 SOLUTION, ORAL ORAL EVERY 4 HOURS PRN
Qty: 1800 ML | Refills: 0 | Status: CANCELLED | OUTPATIENT
Start: 2019-07-17

## 2019-07-17 RX ORDER — OXYCODONE HCL 5 MG/5 ML
10-15 SOLUTION, ORAL ORAL EVERY 4 HOURS PRN
Qty: 1800 ML | Refills: 0 | Status: SHIPPED | OUTPATIENT
Start: 2019-07-17 | End: 2019-08-02

## 2019-07-17 NOTE — PROGRESS NOTES
This is a recent snapshot of the patient's [facility] medical record.  For current drug dose and complete information and questions, call [phone] or In [facility] pool, fv home infusion ([id number])  CSN Number:  [id number]

## 2019-07-17 NOTE — TELEPHONE ENCOUNTER
Script Eprescribed to pharmacy    Will send this to MA team to notify patient.    Signed Prescriptions:                        Disp   Refills    oxyCODONE (ROXICODONE) 5 MG/5ML solution   1800 mL0        Sig: Take 10-15 mLs (10-15 mg) by mouth every 4 hours as           needed for pain Max 60 mg/day. Fill 7/29/19 to           start on 7/31/19.  Authorizing Provider: BRANDYN JENKINS MD  [facility]

## 2019-07-17 NOTE — TELEPHONE ENCOUNTER
Medication refill information reviewed.     Due date for oxyCODONE (ROXICODONE) 5 MG/5ML solution is 7/31/19     Prescriptions prepped for review.     Will route to provider.

## 2019-07-17 NOTE — TELEPHONE ENCOUNTER
Received call from patient requesting refill(s) of oxyCODONE (ROXICODONE) 5 MG/5ML solution    Last picked up from pharmacy on 06/28/19    Pt last seen by prescribing provider on 06/12/19  Next appt scheduled for 09/11/19     checked in the past 6 months? Yes If no, print current report and give to RN    Last urine drug screen date 10/29/18  Current opioid agreement on file (completed within the last year) Yes Date of opioid agreement: 10/29/18    Processing (pick one and delete the others):      E-prescribe to  Pharmacy-[facility] - [place], [place] - [address]      Will route to nursing pool for review and preparation of prescription(s).

## 2019-07-18 ENCOUNTER — MYC MEDICAL ADVICE (OUTPATIENT)
Dept: PALLIATIVE MEDICINE | Facility: CLINIC | Age: 49
End: 2019-07-18

## 2019-07-18 ENCOUNTER — PATIENT OUTREACH (OUTPATIENT)
Dept: CARE COORDINATION | Facility: CLINIC | Age: 49
End: 2019-07-18

## 2019-07-18 DIAGNOSIS — G89.29 CHRONIC ABDOMINAL PAIN: Primary | ICD-10-CM

## 2019-07-18 DIAGNOSIS — R10.9 CHRONIC ABDOMINAL PAIN: Primary | ICD-10-CM

## 2019-07-18 DIAGNOSIS — E43 SEVERE MALNUTRITION (H): ICD-10-CM

## 2019-07-18 DIAGNOSIS — K90.829 SHORT GUT SYNDROME: ICD-10-CM

## 2019-07-18 NOTE — PROGRESS NOTES
This is a recent snapshot of the patient's [facility] medical record.  For current drug dose and complete information and questions, call [phone] or In Basket pool, fv home infusion ([id number])  CSN Number:  [id number]

## 2019-07-18 NOTE — PROGRESS NOTES
Clinic Care Coordination Contact  Follow Up Progress Note      Assessment: Patient was seen by PCP 7/10/19 for hospital follow up.  Patient continues to receive weekly home infusion skilled nurse visits.  Patient will have a azevedo line placed 7/25/19 and PICC removal.  Temperature has been steady around 99 degrees.    No new concerns or questions.     Goals:  Met, patient had follow up with PCP 7/10/19.        Intervention/Education provided during outreach: reinforced importance of follow up.  Patient changed to maintenance.     Outreach Frequency: every 2 months.      Plan:   Patient will have azevedo placed as scheduled 7/25/19.   Care Coordinator will follow up in 2 months.    Melissa Behl BSN, RN, PHN  Primary Care Clinical RN Care Coordinator  [facility]   [phone]

## 2019-07-19 NOTE — TELEPHONE ENCOUNTER
Spoke to wife Rose who put this message in [employer]. We reviewed that the last prescription for a 30 day supply was picked up on 6/28/19 to start on 7/1/19 therefor his prescription for this month which has already been sent to the pharmacy will have a fill date of 7/29/29 and a start date of 7/31/19. She is going to verify how much medication he has left and call back.    oxyCODONE (ROXICODONE) 5 MG/5ML solution 1800 mL 0 7/17/2019  No   Sig - Route: Take 10-15 mLs (10-15 mg) by mouth every 4 hours as needed for pain Max 60 mg/day. Fill 7/29/19 to start on 7/31/19. - Oral        YADIRA LaazrN, RN  Care Coordinator  [facility] [place]

## 2019-07-22 ENCOUNTER — NURSE TRIAGE (OUTPATIENT)
Dept: NURSING | Facility: CLINIC | Age: 49
End: 2019-07-22

## 2019-07-22 ENCOUNTER — APPOINTMENT (OUTPATIENT)
Dept: CT IMAGING | Facility: CLINIC | Age: 49
End: 2019-07-22
Attending: FAMILY MEDICINE
Payer: COMMERCIAL

## 2019-07-22 ENCOUNTER — APPOINTMENT (OUTPATIENT)
Dept: ULTRASOUND IMAGING | Facility: CLINIC | Age: 49
End: 2019-07-22
Attending: FAMILY MEDICINE
Payer: COMMERCIAL

## 2019-07-22 ENCOUNTER — HOSPITAL ENCOUNTER (OUTPATIENT)
Facility: CLINIC | Age: 49
Setting detail: OBSERVATION
Discharge: HOME | End: 2019-07-23
Attending: FAMILY MEDICINE | Admitting: EMERGENCY MEDICINE
Payer: COMMERCIAL

## 2019-07-22 ENCOUNTER — APPOINTMENT (OUTPATIENT)
Dept: GENERAL RADIOLOGY | Facility: CLINIC | Age: 49
End: 2019-07-22
Attending: FAMILY MEDICINE
Payer: COMMERCIAL

## 2019-07-22 DIAGNOSIS — K90.2 POSTOPERATIVE BLIND LOOP SYNDROME: ICD-10-CM

## 2019-07-22 DIAGNOSIS — T82.898A PICC LINE INFILTRATION, INITIAL ENCOUNTER (H): ICD-10-CM

## 2019-07-22 DIAGNOSIS — T82.898S: ICD-10-CM

## 2019-07-22 DIAGNOSIS — E46 MALNUTRITION, UNSPECIFIED TYPE (H): Primary | ICD-10-CM

## 2019-07-22 DIAGNOSIS — T82.594A OBSTRUCTION OF CENTRAL LINE, INITIAL ENCOUNTER (H): ICD-10-CM
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
## 2019-07-22 DIAGNOSIS — K90.829 SHORT GUT SYNDROME: ICD-10-CM

## 2019-07-22 DIAGNOSIS — E86.0 DEHYDRATION: ICD-10-CM

## 2019-07-22 LAB
ALBUMIN SERPL-MCNC: 3.4 G/DL (ref 3.4–5)
ALP SERPL-CCNC: 94 U/L (ref 40–150)
ALT SERPL W P-5'-P-CCNC: 29 U/L (ref 0–70)
ANION GAP SERPL CALCULATED.3IONS-SCNC: 7 MMOL/L (ref 3–14)
APTT PPP: 26 SEC (ref 22–37)
AST SERPL W P-5'-P-CCNC: 18 U/L (ref 0–45)
BASOPHILS # BLD AUTO: 0 10E9/L (ref 0–0.2)
BASOPHILS NFR BLD AUTO: 0.5 %
BILIRUB SERPL-MCNC: 0.5 MG/DL (ref 0.2–1.3)
BUN SERPL-MCNC: 7 MG/DL (ref 7–30)
CALCIUM SERPL-MCNC: 8.4 MG/DL (ref 8.5–10.1)
CHLORIDE SERPL-SCNC: 107 MMOL/L (ref 94–109)
CO2 SERPL-SCNC: 29 MMOL/L (ref 20–32)
CREAT SERPL-MCNC: 0.6 MG/DL (ref 0.66–1.25)
DIFFERENTIAL METHOD BLD: ABNORMAL
EOSINOPHIL # BLD AUTO: 0.1 10E9/L (ref 0–0.7)
EOSINOPHIL NFR BLD AUTO: 1 %
ERYTHROCYTE [DISTWIDTH] IN BLOOD BY AUTOMATED COUNT: 13.2 % (ref 10–15)
GFR SERPL CREATININE-BSD FRML MDRD: >90 ML/MIN/{1.73_M2}
GLUCOSE SERPL-MCNC: 91 MG/DL (ref 70–99)
HCT VFR BLD AUTO: 43 % (ref 40–53)
HGB BLD-MCNC: 13.5 G/DL (ref 13.3–17.7)
IMM GRANULOCYTES # BLD: 0 10E9/L (ref 0–0.4)
IMM GRANULOCYTES NFR BLD: 0.1 %
INR PPP: 1.1 (ref 0.86–1.14)
LYMPHOCYTES # BLD AUTO: 2.2 10E9/L (ref 0.8–5.3)
LYMPHOCYTES NFR BLD AUTO: 27.9 %
MCH RBC QN AUTO: 30.2 PG (ref 26.5–33)
MCHC RBC AUTO-ENTMCNC: 31.4 G/DL (ref 31.5–36.5)
MCV RBC AUTO: 96 FL (ref 78–100)
MONOCYTES # BLD AUTO: 1 10E9/L (ref 0–1.3)
MONOCYTES NFR BLD AUTO: 13.4 %
NEUTROPHILS # BLD AUTO: 4.4 10E9/L (ref 1.6–8.3)
NEUTROPHILS NFR BLD AUTO: 57.1 %
NRBC # BLD AUTO: 0 10*3/UL
NRBC BLD AUTO-RTO: 0 /100
PLATELET # BLD AUTO: 193 10E9/L (ref 150–450)
POTASSIUM SERPL-SCNC: 3.6 MMOL/L (ref 3.4–5.3)
PROT SERPL-MCNC: 6.6 G/DL (ref 6.8–8.8)
RBC # BLD AUTO: 4.47 10E12/L (ref 4.4–5.9)
SODIUM SERPL-SCNC: 142 MMOL/L (ref 133–144)
WBC # BLD AUTO: 7.8 10E9/L (ref 4–11)

## 2019-07-22 PROCEDURE — 99285 EMERGENCY DEPT VISIT HI MDM: CPT | Mod: 25

## 2019-07-22 PROCEDURE — 71046 X-RAY EXAM CHEST 2 VIEWS: CPT

## 2019-07-22 PROCEDURE — 96374 THER/PROPH/DIAG INJ IV PUSH: CPT

## 2019-07-22 PROCEDURE — 99219 ZZC INITIAL OBSERVATION CARE,LEVL II: CPT | Mod: Z6 | Performed by: PHYSICIAN ASSISTANT

## 2019-07-22 PROCEDURE — 25000125 ZZHC RX 250: Performed by: FAMILY MEDICINE

## 2019-07-22 PROCEDURE — 93971 EXTREMITY STUDY: CPT | Mod: RT

## 2019-07-22 PROCEDURE — 96361 HYDRATE IV INFUSION ADD-ON: CPT

## 2019-07-22 PROCEDURE — 80053 COMPREHEN METABOLIC PANEL: CPT | Performed by: FAMILY MEDICINE

## 2019-07-22 PROCEDURE — 25000128 H RX IP 250 OP 636: Performed by: FAMILY MEDICINE

## 2019-07-22 PROCEDURE — 85730 THROMBOPLASTIN TIME PARTIAL: CPT | Performed by: FAMILY MEDICINE

## 2019-07-22 PROCEDURE — 85025 COMPLETE CBC W/AUTO DIFF WBC: CPT | Performed by: FAMILY MEDICINE

## 2019-07-22 PROCEDURE — 85610 PROTHROMBIN TIME: CPT | Performed by: FAMILY MEDICINE

## 2019-07-22 PROCEDURE — 70450 CT HEAD/BRAIN W/O DYE: CPT

## 2019-07-22 PROCEDURE — G0378 HOSPITAL OBSERVATION PER HR: HCPCS

## 2019-07-22 PROCEDURE — 25000132 ZZH RX MED GY IP 250 OP 250 PS 637: Performed by: FAMILY MEDICINE

## 2019-07-22 PROCEDURE — 96375 TX/PRO/DX INJ NEW DRUG ADDON: CPT

## 2019-07-22 PROCEDURE — 70486 CT MAXILLOFACIAL W/O DYE: CPT

## 2019-07-22 RX ORDER — ONDANSETRON 4 MG/1
8 TABLET, ORALLY DISINTEGRATING ORAL EVERY 6 HOURS PRN
Status: DISCONTINUED | OUTPATIENT
Start: 2019-07-22 | End: 2019-07-23 | Stop reason: HOSPADM

## 2019-07-22 RX ORDER — HYDROMORPHONE HYDROCHLORIDE 1 MG/ML
0.5 INJECTION, SOLUTION INTRAMUSCULAR; INTRAVENOUS; SUBCUTANEOUS
Status: COMPLETED | OUTPATIENT
Start: 2019-07-22 | End: 2019-07-22

## 2019-07-22 RX ORDER — LIDOCAINE 40 MG/G
CREAM TOPICAL
Status: DISCONTINUED | OUTPATIENT
Start: 2019-07-22 | End: 2019-07-23 | Stop reason: HOSPADM

## 2019-07-22 RX ORDER — ACETAMINOPHEN 650 MG/1
650 SUPPOSITORY RECTAL EVERY 4 HOURS PRN
Status: DISCONTINUED | OUTPATIENT
Start: 2019-07-22 | End: 2019-07-22

## 2019-07-22 RX ORDER — SODIUM CHLORIDE 9 MG/ML
1000 INJECTION, SOLUTION INTRAVENOUS CONTINUOUS
Status: DISCONTINUED | OUTPATIENT
Start: 2019-07-22 | End: 2019-07-22

## 2019-07-22 RX ORDER — DEXTROAMPHETAMINE SACCHARATE, AMPHETAMINE ASPARTATE, DEXTROAMPHETAMINE SULFATE AND AMPHETAMINE SULFATE 2.5; 2.5; 2.5; 2.5 MG/1; MG/1; MG/1; MG/1
20 TABLET ORAL DAILY
Status: DISCONTINUED | OUTPATIENT
Start: 2019-07-23 | End: 2019-07-23 | Stop reason: HOSPADM

## 2019-07-22 RX ORDER — POLYETHYLENE GLYCOL 3350 17 G
2 POWDER IN PACKET (EA) ORAL
Status: DISCONTINUED | OUTPATIENT
Start: 2019-07-22 | End: 2019-07-23 | Stop reason: HOSPADM

## 2019-07-22 RX ORDER — SUCRALFATE ORAL 1 G/10ML
1 SUSPENSION ORAL ONCE
Status: DISCONTINUED | OUTPATIENT
Start: 2019-07-22 | End: 2019-07-22

## 2019-07-22 RX ORDER — ONDANSETRON 2 MG/ML
4 INJECTION INTRAMUSCULAR; INTRAVENOUS EVERY 30 MIN PRN
Status: DISCONTINUED | OUTPATIENT
Start: 2019-07-22 | End: 2019-07-22

## 2019-07-22 RX ORDER — SUCRALFATE ORAL 1 G/10ML
1 SUSPENSION ORAL
Status: DISCONTINUED | OUTPATIENT
Start: 2019-07-22 | End: 2019-07-23 | Stop reason: HOSPADM

## 2019-07-22 RX ORDER — ACETAMINOPHEN 325 MG/1
650 TABLET ORAL EVERY 4 HOURS PRN
Status: DISCONTINUED | OUTPATIENT
Start: 2019-07-22 | End: 2019-07-22

## 2019-07-22 RX ORDER — LORAZEPAM 1 MG/1
1 TABLET ORAL AT BEDTIME
Status: DISCONTINUED | OUTPATIENT
Start: 2019-07-22 | End: 2019-07-22

## 2019-07-22 RX ORDER — NALOXONE HYDROCHLORIDE 0.4 MG/ML
.1-.4 INJECTION, SOLUTION INTRAMUSCULAR; INTRAVENOUS; SUBCUTANEOUS
Status: DISCONTINUED | OUTPATIENT
Start: 2019-07-22 | End: 2019-07-23 | Stop reason: HOSPADM

## 2019-07-22 RX ORDER — ONDANSETRON 4 MG/1
4 TABLET, ORALLY DISINTEGRATING ORAL EVERY 6 HOURS PRN
Status: DISCONTINUED | OUTPATIENT
Start: 2019-07-22 | End: 2019-07-22

## 2019-07-22 RX ORDER — OXYCODONE HCL 5 MG/5 ML
10 SOLUTION, ORAL ORAL EVERY 4 HOURS PRN
Status: DISCONTINUED | OUTPATIENT
Start: 2019-07-22 | End: 2019-07-23 | Stop reason: HOSPADM

## 2019-07-22 RX ORDER — CARVEDILOL 6.25 MG/1
6.25 TABLET ORAL 2 TIMES DAILY WITH MEALS
Status: DISCONTINUED | OUTPATIENT
Start: 2019-07-22 | End: 2019-07-23 | Stop reason: HOSPADM

## 2019-07-22 RX ORDER — ONDANSETRON 2 MG/ML
4 INJECTION INTRAMUSCULAR; INTRAVENOUS EVERY 6 HOURS PRN
Status: DISCONTINUED | OUTPATIENT
Start: 2019-07-22 | End: 2019-07-23 | Stop reason: HOSPADM

## 2019-07-22 RX ADMIN — LIDOCAINE HYDROCHLORIDE 30 ML: 20 SOLUTION ORAL; TOPICAL at 20:29

## 2019-07-22 RX ADMIN — HYDROMORPHONE HYDROCHLORIDE 0.5 MG: 1 INJECTION, SOLUTION INTRAMUSCULAR; INTRAVENOUS; SUBCUTANEOUS at 18:20

## 2019-07-22 RX ADMIN — ONDANSETRON 4 MG: 2 INJECTION INTRAMUSCULAR; INTRAVENOUS at 18:26

## 2019-07-22 RX ADMIN — SODIUM CHLORIDE 1000 ML: 9 INJECTION, SOLUTION INTRAVENOUS at 18:21

## 2019-07-22 ASSESSMENT — ENCOUNTER SYMPTOMS
ABDOMINAL PAIN: 0
CONFUSION: 0
COLOR CHANGE: 0
FEVER: 0
SHORTNESS OF BREATH: 0
HEADACHES: 0
EYE REDNESS: 0
ARTHRALGIAS: 0
DIFFICULTY URINATING: 0
NECK STIFFNESS: 0

## 2019-07-22 ASSESSMENT — MIFFLIN-ST. JEOR: SCORE: 1745.82

## 2019-07-22 NOTE — ED PROVIDER NOTES
[facility] EMERGENCY DEPARTMENT ([facility])  7/22/19   History     Chief Complaint   Patient presents with     Vascular Access Problem     The history is provided by the patient and medical records.     Parker Acevedo . is a 48 year old male who has a PMHx of HFrEF and Gastric bypass complicated by ulceration requiring multiple revisions and TPN dependant who presents to the Emergency Department due to concern that his right PICC line that is been pulled out slightly.  Patient states this has been there for a few months.  He notes some intermittent swelling; some intermittent right chest pain at times also but no shortness of breath.  Patient has history of short gut syndrome. Patient will have a Cm placed during his appointment on the 25th. He has had a history of some clots in the past before.  He notes some intermittent swelling in the right arm also.  They are here for evaluation and to make sure there is not a blood clot.  No fevers noted otherwise.     I have reviewed the Medications, Allergies, Past Medical and Surgical History, and Social History in the [employer] system.    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)      Chronic abdominal pain      Complication of anesthesia      Difficulty swallowing      Gastric ulcer, unspecified as acute or chronic, without mention of hemorrhage, perforation, or obstruction      Gastro-oesophageal reflux disease      Generalized weakness 1/30/2018     Head injury      Hiatal hernia      Other bladder disorder      Other chronic pain      PONV (postoperative nausea and vomiting)      Severe malnutrition (H)     TPN     Short gut syndrome      Tobacco abuse        Past Surgical History:   Procedure Laterality Date     AMPUTATION        APPENDECTOMY       BACK SURGERY  11/3/2014    curve in the spine     BIOPSY LYMPH NODE CERVICAL N/A 2/20/2015    Procedure: BIOPSY LYMPH NODE CERVICAL;  Surgeon: Baron Scanlon MD;  Location: [facility] OR     C GASTRIC BYPASS,OBESE<100CM SHAYLEE-EN-Y  2002    lost 300 pounds     CHOLECYSTECTOMY       DISCECTOMY, FUSION CERVICAL ANTERIOR ONE LEVEL, COMBINED N/A 2/15/2017    Procedure: COMBINED DISCECTOMY, FUSION CERVICAL ANTERIOR ONE LEVEL;  Surgeon: Darren Campos MD;  Location: [facility] OR     ENDOSCOPIC INSERTION TUBE GASTROSTOMY  9/9/2013    Procedure: ENDOSCOPIC INSERTION TUBE GASTROSTOMY;;  Surgeon: Francis Vyas MD;  [facility]     ENDOSCOPIC ULTRASOUND UPPER GASTROINTESTINAL TRACT (GI)  4/29/2011    Procedure:ENDOSCOPIC ULTRASOUND UPPER GASTROINTESTINAL TRACT (GI); Both Procedures done Conjointly; Surgeon:NEREIDA HOUSER; [facility]     ENDOSCOPIC ULTRASOUND UPPER GASTROINTESTINAL TRACT (GI)  9/9/2013    Procedure: ENDOSCOPIC ULTRASOUND UPPER GASTROINTESTINAL TRACT (GI);  Endoscopic Ultrasound Guide Gastrostomy Tube Placement  C-arm;  Surgeon: Noe Lizarraga MD;  [facility] OR     ENDOSCOPY  03/25/11    EGD, [place] [facility]     ENDOSCOPY  08/04/09    Upper Endoscopy, [facility]     ENDOSCOPY  01/05/09    Upper Endoscopy, [facility]     ESOPHAGOSCOPY, GASTROSCOPY, DUODENOSCOPY (EGD), COMBINED  4/20/2011    Procedure:COMBINED ESOPHAGOSCOPY, GASTROSCOPY, DUODENOSCOPY (EGD); Surgeon:FRANCIS VYAS; [facility]     ESOPHAGOSCOPY, GASTROSCOPY, DUODENOSCOPY (EGD), COMBINED  6/15/2011    Procedure:COMBINED ESOPHAGOSCOPY, GASTROSCOPY, DUODENOSCOPY (EGD); Surgeon:FRANCIS VYAS; [facility]     ESOPHAGOSCOPY, GASTROSCOPY, DUODENOSCOPY (EGD), COMBINED  6/12/2013    Procedure: COMBINED ESOPHAGOSCOPY, GASTROSCOPY, DUODENOSCOPY (EGD);;  Surgeon: Francis Vyas MD;  [facility]     ESOPHAGOSCOPY, GASTROSCOPY, DUODENOSCOPY (EGD), COMBINED  11/22/2013     Procedure: COMBINED ESOPHAGOSCOPY, GASTROSCOPY, DUODENOSCOPY (EGD);;  Surgeon: Francis Vyas MD;  [facility]     ESOPHAGOSCOPY, GASTROSCOPY, DUODENOSCOPY (EGD), COMBINED  4/30/2014    Procedure: COMBINED ESOPHAGOSCOPY, GASTROSCOPY, DUODENOSCOPY (EGD);  Surgeon: Francis Vyas MD;  [facility]     ESOPHAGOSCOPY, GASTROSCOPY, DUODENOSCOPY (EGD), COMBINED N/A 2/20/2015    Procedure: COMBINED ESOPHAGOSCOPY, GASTROSCOPY, DUODENOSCOPY (EGD), BIOPSY SINGLE OR MULTIPLE;  Surgeon: Baron Scanlon MD;  Location: [facility]     ESOPHAGOSCOPY, GASTROSCOPY, DUODENOSCOPY (EGD), COMBINED N/A 9/30/2015    Procedure: COMBINED ESOPHAGOSCOPY, GASTROSCOPY, DUODENOSCOPY (EGD);  Surgeon: Francis Vyas MD;  Location:  GI     GASTRECTOMY  6/22/2012    Procedure: GASTRECTOMY;  Open Approach, Excise Ulcers,Partial Gastrectomy, Esophagojejunostomy, Hiatal Hernia Repair, Extensive Lysis of Adhesions and Esaphagogastrodudenoscopy.;  Surgeon: Francis Vyas MD;  [facility]     GASTROJEJUNOSTOMY  08/26/09    Extensice enterolysis, partial resect. jejunum, part. resect gastric pouch, gastrojejunostomy anastomosis     HC ESOPH/GAS REFLUX TEST W NASAL IMPED ELECTRODE  8/5/2013    Procedure: ESOPHAGEAL IMPEDENCE FUNCTION TEST 1 HOUR OR LESS;  Surgeon: Halie Lang MD;  Location:  GI     HEAD & NECK SURGERY  2/15/2017    C5-C6     HERNIA REPAIR  2006    Umbilical hernia     HERNIORRHAPHY HIATAL  6/22/2012    Procedure: HERNIORRHAPHY HIATAL;;  Surgeon: Francis Vyas MD;  [facility]     HERNIORRHAPHY INGUINAL  11/22/2013    Procedure: HERNIORRHAPHY INGUINAL;;  Surgeon: Francis Vyas MD;  [facility]     INSERT PORT VASCULAR ACCESS Right 12/19/2017    Procedure: INSERT PORT VASCULAR ACCESS;  Right Chest Port Placement ;  Surgeon: Lisandro Alejandro PA-C;  Location:  OR     INSERT PORT VASCULAR ACCESS Right 8/2/2018    Procedure: INSERT PORT VASCULAR ACCESS;  Place single  lumen tunneled central venous access catheter;  Surgeon: Guy Jamil PA-C;  [facility]     IR PICC PLACEMENT > 5 YRS OF AGE  3/7/2019     LAPAROTOMY EXPLORATORY  11/22/2013    Procedure: LAPAROTOMY EXPLORATORY;  Exploratory Laparotomy, Upper Endoscopy, Left Inguinal Hernia Repair;  Surgeon: Francis Vyas MD;  [facility]     ORTHOPEDIC SURGERY       PICC INSERTION Right 03/16/2017    5fr DL BioFlo PICC, 42cm (3cm external) in the R medial brachial vein w/ tip in the SVC RA junction.     PICC INSERTION Left 09/23/2017    5fr DL BioFlo PICC, 45cm (1cm external) in the L basilic vein w/ tip in the SVC RA junction.     PICC INSERTION Right 05/16/2019    5Fr - 43cm, Medial brachial vein, low SVC     SHAYLEE EN Y BOWEL  2003     SOFT TISSUE SURGERY       SOFT TISSUE SURGERY       THORACIC SURGERY       TONSILLECTOMY       TRANSESOPHAGEAL ECHOCARDIOGRAM INTRAOPERATIVE N/A 1/8/2019    Procedure: TRANSESOPHAGEAL ECHOCARDIOGRAM INTRAOPERATIVE;  Surgeon: GENERIC ANESTHESIA PROVIDER;  Location[facility]       Family History   Problem Relation Age of Onset     Gastrointestinal Disease Mother         Crohns disease     Anxiety Disorder Mother      Thyroid Disease Mother         Grave's disease     Cancer Father         ear cancer-skin cancer/melanoma     Breast Cancer Maternal Grandmother      Macular Degeneration Maternal Grandfather      Anxiety Disorder Sister      Diabetes Maternal Uncle      Breast Cancer Other      Hypertension No family hx of      Hyperlipidemia No family hx of      Cerebrovascular Disease No family hx of      Prostate Cancer No family hx of      Depression No family hx of      Anesthesia Reaction No family hx of      Asthma No family hx of      Osteoporosis No family hx of      Genetic Disorder No family hx of      Obesity No family hx of      Mental Illness No family hx of      Substance Abuse No family hx of      Glaucoma No family hx of        Social History     Tobacco Use      Smoking status: Current Some Day Smoker     Packs/day: 0.25     Years: 3.00     Pack years: 0.75     Types: Cigarettes     Last attempt to quit: 2018     Years since quittin.9     Smokeless tobacco: Never Used     Tobacco comment: I use an e cig every now and than   Substance Use Topics     Alcohol use: No     Comment: quit        Current Facility-Administered Medications   Medication     acetaminophen (TYLENOL) solution 1,000 mg     amphetamine-dextroamphetamine (ADDERALL) per tablet 20 mg     carvedilol (COREG) tablet 6.25 mg     dextrose 5% and 0.9% NaCl infusion     lidocaine (LMX4) cream     lidocaine 1 % 0.1-1 mL     LORazepam (ATIVAN) 1 mg/0.5 mL (HIGH CONC) solution 1 mg     melatonin tablet 1 mg     naloxone (NARCAN) injection 0.1-0.4 mg     nicotine (COMMIT) lozenge 2 mg     ondansetron (ZOFRAN) injection 4 mg     ondansetron (ZOFRAN-ODT) ODT tab 8 mg     oxyCODONE (ROXICODONE) solution 10 mg     sodium chloride (PF) 0.9% PF flush 3 mL     sodium chloride (PF) 0.9% PF flush 3 mL     sucralfate (CARAFATE) suspension 1 g        Allergies   Allergen Reactions     Bactrim [Sulfamethoxazole W/Trimethoprim] Rash     Penicillins Anaphylaxis     Please see Antimicrobial Management Team allergy assessment note 10/10/2018. Patient reported tolerating amoxicillin.     Doxycycline Rash     Vancomycin Rash     Rash after receiving vancomycin 3/28/16 (red man's?). Tolerated with slower infusion and diphenhydramine premed.        Review of Systems   Constitutional: Positive for activity change and appetite change. Negative for fever.        Patient has short gut syndrome.  He drinks minimal orally last TPN and fluids was Saturday.   HENT: Positive for trouble swallowing. Negative for congestion.    Eyes: Negative for redness and visual disturbance.   Respiratory: Negative for shortness of breath.    Cardiovascular: Positive for chest pain.        Positive for right arm swelling   Gastrointestinal:  "Negative for abdominal pain and nausea.   Genitourinary: Negative for difficulty urinating.   Musculoskeletal: Positive for myalgias. Negative for arthralgias and neck stiffness.        Noted patient with right arm PICC line when he is infusing noted increasing swelling from 28 to 32 cm in circumference of his upper extremity on Saturday.  Also with this pain numbness paresthesias distally that resolved   Skin: Negative for color change.   Neurological: Positive for numbness. Negative for weakness and headaches.   Hematological: Does not bruise/bleed easily.   Psychiatric/Behavioral: Positive for decreased concentration and dysphoric mood. Negative for confusion.   All other systems reviewed and are negative.      Physical Exam   BP: (!) 151/96  Pulse: 66  Temp: 98.1  F (36.7  C)  Resp: 18  Height: 180.3 cm (5' 11\")  Weight: 84.4 kg (186 lb)  SpO2: 99 %      Physical Exam   Constitutional: He is oriented to person, place, and time. He appears well-developed and well-nourished. He appears distressed.   Mild distress of patient here with wife.  Is alert and oriented.   HENT:   Head: Normocephalic and atraumatic.   Dry mucous membranes noted.   Eyes: Pupils are equal, round, and reactive to light. Conjunctivae and EOM are normal. No scleral icterus.   Neck: Normal range of motion. Neck supple. No JVD present. No tracheal deviation present.   Cardiovascular: Normal rate and regular rhythm.   Pulmonary/Chest: Effort normal and breath sounds normal. No respiratory distress. He exhibits no tenderness.   Abdominal: He exhibits no distension and no mass. There is no tenderness. There is no guarding.   Musculoskeletal: He exhibits tenderness. He exhibits no edema or deformity.   PICC line right arm noted.  Some mild tenderness noted but no erythema no marked drainage from the PICC line insertion site it is pulled back approximately 6 cm.   Neurological: He is alert and oriented to person, place, and time.   Skin: Skin is warm " and dry. Capillary refill takes less than 2 seconds. No rash noted. He is not diaphoretic. No erythema. No pallor.   Psychiatric:   Flattened affect otherwise appropriate.   Nursing note and vitals reviewed.      ED Course        Procedures         Patient I have evaluated here in the ER initially in triage.  Chest x-ray done revealing good positioning of the PICC line.  Right upper extremity venous ultrasound negative for DVT.  Patient notes his concerns about unable to infuse as there is increasing pain swelling and paresthesias that persist transiently afterwards.  Labs are drawn a peripheral IV established patient has not had much oral intake in the last several days.  2 L normal saline bolus given in the ER.  Dilaudid IV for pain Zofran for nausea GI cocktail and Carafate given also.  Patient feeling better here in the ER labs otherwise reviewed and stable discussed with patient talked at length initially with the PICC line nurse who did not feel this point there was enough staff to come down and place a new pack.  He did initially a new PET could be placed tomorrow.  Then I talked to interventional radiology who at this point we will plan for Cm placement tomorrow in the meantime patient will be admitted to the ED observation for ongoing hydration pain control etc.  Patient seen by JACQUI in the ER also.  Patient otherwise stable.       Critical Care time:  none             Labs Ordered and Resulted from Time of ED Arrival Up to the Time of Departure from the ED   CBC WITH PLATELETS DIFFERENTIAL - Abnormal; Notable for the following components:       Result Value    MCHC 31.4 (*)     All other components within normal limits   COMPREHENSIVE METABOLIC PANEL - Abnormal; Notable for the following components:    Creatinine 0.60 (*)     Calcium 8.4 (*)     Protein Total 6.6 (*)     All other components within normal limits   INR   PARTIAL THROMBOPLASTIN TIME   PERIPHERAL IV CATHETER     Results for orders placed  or performed during the hospital encounter of 07/22/19   Chest XR,  PA & LAT    Narrative    XR CHEST 2 VW7/22/2019 3:02 PM    INDICATION: picc line evaluation    COMPARISON:  5/24/2019    FINDINGS: PA and lateral views of the chest. Right arm PICC projects  with tip at the mid SVC. Cardiac silhouette is stable from prior.  Cervical hardware. No focal pulmonary opacity. Upper abdomen is  unremarkable. No acute osseous lesions.      Impression    IMPRESSION: Stable position of the right arm PICC with otherwise  stable chest.    I have personally reviewed the examination and initial interpretation  and I agree with the findings.    MEGAN CANALES MD   US Upper Extremity Venous Duplex Right    Narrative    EXAMINATION: DOPPLER VENOUS ULTRASOUND OF THE RIGHT UPPER EXTREMITY,  7/22/2019 3:06 PM     COMPARISON: 1/4/2019    HISTORY: PICC with swelling.    TECHNIQUE:  Gray-scale evaluation with compression, spectral flow and  color Doppler assessment of the deep venous system of the right upper  extremity.    FINDINGS:  Right: Normal blood flow and waveforms are demonstrated in the  internal jugular, innominate, subclavian, and axillary veins. There is  normal compressibility of the brachial, basilic and cephalic veins.  PICC visualized in the right subclavian, axillary and brachial veins.      Impression    IMPRESSION:  No evidence of right upper extremity deep venous  thrombosis.    KENNETH MINOR MD   Head CT w/o contrast    Narrative    CT HEAD W/O CONTRAST 7/22/2019 2:42 PM    Provided History: trauma  ICD-10: Trauma    Comparison: None available.    Technique: Using multidetector thin collimation helical acquisition  technique, axial, coronal and sagittal CT images from the skull base  to the vertex were obtained without intravenous contrast.     Findings:    No intracranial hemorrhage, mass effect, or midline shift. The  ventricles are proportionate to the cerebral sulci. The gray to white  matter differentiation of  the cerebral hemispheres is preserved. The  basal cisterns are patent.    The visualized paranasal sinuses are clear. The mastoid air cells are  clear.       Impression    Impression: No acute intracranial pathology.    SERGEI RAMIREZ MD   CT Facial Bones without Contrast    Narrative    CT FACIAL BONES WITHOUT CONTRAST 7/22/2019 2:42 PM    History:  left facial trauma    Comparison:  None available.      Technique: Using thin collimation multidetector helical acquisition  technique, axial and coronal thin section CT images were reconstructed  through the facial bones. Images were reviewed in bone and soft tissue  windows.    Findings:  There is no significant soft tissue swelling of the face.  There is no evident fracture of the facial bones. The cribriform plate  appears intact. Alignment of the facial bones appears normal.     There is no hematoma, soft tissue mass or gas visualized within the  orbits. The visualized portions of the paranasal sinuses are clear.       Impression    Impression: Normal CT study of the facial bones.          SERGEI RAMIREZ MD   CBC with platelets differential   Result Value Ref Range    WBC 7.8 4.0 - 11.0 10e9/L    RBC Count 4.47 4.4 - 5.9 10e12/L    Hemoglobin 13.5 13.3 - 17.7 g/dL    Hematocrit 43.0 40.0 - 53.0 %    MCV 96 78 - 100 fl    MCH 30.2 26.5 - 33.0 pg    MCHC 31.4 (L) 31.5 - 36.5 g/dL    RDW 13.2 10.0 - 15.0 %    Platelet Count 193 150 - 450 10e9/L    Diff Method Automated Method     % Neutrophils 57.1 %    % Lymphocytes 27.9 %    % Monocytes 13.4 %    % Eosinophils 1.0 %    % Basophils 0.5 %    % Immature Granulocytes 0.1 %    Nucleated RBCs 0 0 /100    Absolute Neutrophil 4.4 1.6 - 8.3 10e9/L    Absolute Lymphocytes 2.2 0.8 - 5.3 10e9/L    Absolute Monocytes 1.0 0.0 - 1.3 10e9/L    Absolute Eosinophils 0.1 0.0 - 0.7 10e9/L    Absolute Basophils 0.0 0.0 - 0.2 10e9/L    Abs Immature Granulocytes 0.0 0 - 0.4 10e9/L    Absolute Nucleated RBC 0.0    INR   Result  Value Ref Range    INR 1.10 0.86 - 1.14   Partial thromboplastin time   Result Value Ref Range    PTT 26 22 - 37 sec   Comprehensive metabolic panel   Result Value Ref Range    Sodium 142 133 - 144 mmol/L    Potassium 3.6 3.4 - 5.3 mmol/L    Chloride 107 94 - 109 mmol/L    Carbon Dioxide 29 20 - 32 mmol/L    Anion Gap 7 3 - 14 mmol/L    Glucose 91 70 - 99 mg/dL    Urea Nitrogen 7 7 - 30 mg/dL    Creatinine 0.60 (L) 0.66 - 1.25 mg/dL    GFR Estimate >90 >60 mL/min/[1.73_m2]    GFR Estimate If Black >90 >60 mL/min/[1.73_m2]    Calcium 8.4 (L) 8.5 - 10.1 mg/dL    Bilirubin Total 0.5 0.2 - 1.3 mg/dL    Albumin 3.4 3.4 - 5.0 g/dL    Protein Total 6.6 (L) 6.8 - 8.8 g/dL    Alkaline Phosphatase 94 40 - 150 U/L    ALT 29 0 - 70 U/L    AST 18 0 - 45 U/L     *Note: Due to a large number of results and/or encounters for the requested time period, some results have not been displayed. A complete set of results can be found in Results Review.            Assessments & Plan (with Medical Decision Making)  48-year-old male with short gut syndrome presents now with his PICC line not working its pullback 6 cm and also when they infuse TPN increasing swelling the upper arm with pain and distal paresthesias.  Patient has no fever otherwise presented here chest x-ray was done with good positioning ultrasound of the right upper extremity negative for clot.  Labs otherwise stable peripheral IV placed IV fluids given here in the ER for rehydration discussed with interventional radiology we will plan to this point for Cm to be placed tomorrow instead of this Friday.  Patient admitted to ED observation for hydration pain control etc. agrees with plan and stable.           I have reviewed the nursing notes.    I have reviewed the findings, diagnosis, plan and need for follow up with the patient.       Medication List      There are no discharge medications for this visit.         Final diagnoses:   Dehydration   PICC line infiltration,  initial encounter (H)   Short gut syndrome       7/22/2019   [facility], [address], EMERGENCY DEPARTMENT    This note was created at least in part by the use of dragon voice dictation system. Inadvertent typographical errors may still exist.  Jeet Orozco MD.         Jeet Orozco MD  07/23/19 0008

## 2019-07-22 NOTE — TELEPHONE ENCOUNTER
Spoke to patients wife Luan Canales as no oxyCODONE (ROXICODONE) 5 MG/5ML solution left. He used the last of this medication at 3:00 am this morning. The start date for his current script is 7/31/19 so patient is out 9 days early.     Rose told me that he manages his own medication. She reports she has tried to talk to him about how much he is allowed to use on a daily basis but that he is in pain and sometimes he has more pain than others.     Rose reports they are taking him to the [facility] today as his pick line has migrated and it is causing pain in his arm.    Jyothi Winchester, YADIRAN, RN  Care Coordinator  [facility]

## 2019-07-22 NOTE — TELEPHONE ENCOUNTER
I tried to reach you but was unable (if you have a cell phone number, you should add it to your profile).    It is EXTREMELY concerning that you are out of medication 9 days early.  This overuse is not a small thing, and the risks of over use like that are severe including death from overdose.  I am also concerned about the simple request for picking up due to a procedure, without mentioning the fact that you are out of medication or the overuse that occurred.    Your insurance won't allow the medication to be released this early.    As I couldn't get ahold of you today, I will try again tomorrow to discuss how to manage.  In the meantime, if you have significant withdrawal symptoms, then you will need to go to the ER.    Jessica Milligan MD  [facility]

## 2019-07-22 NOTE — ED TRIAGE NOTES
Patient presents to triage c/o PICC line coming out. Pt states he slept wrong and noticed the external length of the catheter at approximately 4 cm, it was 1 cm when placed. Pt states the line flushes, but painfully. He reports having an appointment to have a Cm placed on the 25th.

## 2019-07-22 NOTE — ED NOTES
"ED Triage Provider Note  [facility]  Encounter Date: Jul 22, 2019    History:  Chief Complaint   Patient presents with     Vascular Access Problem     Parker Acevedo Sr. is a 48 year old male who presents to the ED with concerns of right PICC line that is been pulled out slightly.  Patient states is been there for a few months.  He notes some intermittent swelling some intermittent right chest pain at times also but no shortness of breath.  Patient has history of short gut syndrome.  Is can have a Cm placed at some point has had history of some clots in the past before.  He notes some intermittent swelling in the right arm also.  They are here for evaluation make sure there is not a blood clot.  No fevers noted otherwise..   Review of Systems:  As noted patient notes some intermittent right chest pain at times but no true shortness of breath no fevers or cough.  Patient also notes some intermittent swelling in the right arm.  No numbness reported.  No reports hemoptysis otherwise no abdominal pain currently at this point.  No fevers or chills reported.  No back pain.  Patient's PICC line noticed to be pulled out approximately few inches.  Patient notes this was when he was turning over in bed last night.    Exam:  BP (!) 151/96   Pulse 66   Temp 98.1  F (36.7  C) (Oral)   Resp 18   Ht 1.803 m (5' 11\")   Wt 84.4 kg (186 lb)   SpO2 99%   BMI 25.94 kg/m    General: No acute distress. Appears stated age.   Cardio: Regular rate, extremities well perfused  Resp: Normal work of breathing, grossly normal respiratory rate  Neuro: Alert. CN II-XII grossly intact. Grossly intact strength.   Patient's right PICC line reveals slightly removed but no bleeding or swelling at site.  There is some minimal swelling of the right arm itself no vascular compromise otherwise noted distal CMS intact.  Lungs are clear otherwise no current chest pain noted.  he appears nontoxic patient here " with his wife.    Medical Decision Making:  Patient arriving to the ED with problem as above. A medical screening exam was performed.  Chest x-ray along with right upper extremity venous Doppler ultrasound was ordered  initiated from Triage. The patient is appropriate to wait in triage.      Jeet Orozco MD on 7/22/2019 at 2:38 PM       Jeet Orozco MD  07/23/19 0008

## 2019-07-22 NOTE — TELEPHONE ENCOUNTER
Parker has a PICC line in that is out 5 cm. Last TPN and hydration was 7/20/2019. It's painful to use and his arm is swollen. Per the protocol I instructed that Parker go to an ER.    Wife, Rose was the caller. Parker gave me a verbal okay to speak with Rose. Rose said they'll go to the [facility] ER.    Candido is scheduled to have a Cm placed 7/25/2019.    Reason for Disposition    Arm is swollen, new onset (or leg swelling if IV in lower extremity)    Additional Information    Negative: Severe difficulty breathing (e.g., struggling for each breath, speaks in single words)    Negative: Shock suspected (e.g., cold/pale/clammy skin, too weak to stand, low BP, rapid pulse)    Negative: Sounds like a life-threatening emergency to the triager    Negative: IV not running or running slowly    Protocols used: IV SITE (SKIN) SYMPTOMS-A-EMIGDIO IVORY RN [facility]

## 2019-07-23 ENCOUNTER — HOME INFUSION (PRE-WILLOW HOME INFUSION) (OUTPATIENT)
Dept: PHARMACY | Facility: CLINIC | Age: 49
End: 2019-07-23

## 2019-07-23 ENCOUNTER — TELEPHONE (OUTPATIENT)
Dept: INTERVENTIONAL RADIOLOGY/VASCULAR | Facility: CLINIC | Age: 49
End: 2019-07-23

## 2019-07-23 ENCOUNTER — TELEPHONE (OUTPATIENT)
Dept: PALLIATIVE MEDICINE | Facility: CLINIC | Age: 49
End: 2019-07-23

## 2019-07-23 VITALS
OXYGEN SATURATION: 99 % | HEART RATE: 70 BPM | RESPIRATION RATE: 16 BRPM | HEIGHT: 71 IN | DIASTOLIC BLOOD PRESSURE: 100 MMHG | BODY MASS INDEX: 26.04 KG/M2 | WEIGHT: 186 LBS | TEMPERATURE: 97.9 F | SYSTOLIC BLOOD PRESSURE: 167 MMHG

## 2019-07-23 DIAGNOSIS — R11.0 NAUSEA: ICD-10-CM

## 2019-07-23 LAB
ALBUMIN SERPL-MCNC: 3.3 G/DL (ref 3.4–5)
ALP SERPL-CCNC: 91 U/L (ref 40–150)
ALT SERPL W P-5'-P-CCNC: 32 U/L (ref 0–70)
ANION GAP SERPL CALCULATED.3IONS-SCNC: 6 MMOL/L (ref 3–14)
AST SERPL W P-5'-P-CCNC: 27 U/L (ref 0–45)
BASOPHILS # BLD AUTO: 0.1 10E9/L (ref 0–0.2)
BASOPHILS NFR BLD AUTO: 0.7 %
BILIRUB SERPL-MCNC: 0.6 MG/DL (ref 0.2–1.3)
BUN SERPL-MCNC: 8 MG/DL (ref 7–30)
CALCIUM SERPL-MCNC: 8.1 MG/DL (ref 8.5–10.1)
CHLORIDE SERPL-SCNC: 107 MMOL/L (ref 94–109)
CO2 SERPL-SCNC: 26 MMOL/L (ref 20–32)
CREAT SERPL-MCNC: 0.62 MG/DL (ref 0.66–1.25)
DIFFERENTIAL METHOD BLD: ABNORMAL
EOSINOPHIL # BLD AUTO: 0.1 10E9/L (ref 0–0.7)
EOSINOPHIL NFR BLD AUTO: 1.9 %
ERYTHROCYTE [DISTWIDTH] IN BLOOD BY AUTOMATED COUNT: 13.4 % (ref 10–15)
GFR SERPL CREATININE-BSD FRML MDRD: >90 ML/MIN/{1.73_M2}
GLUCOSE BLDC GLUCOMTR-MCNC: 115 MG/DL (ref 70–99)
GLUCOSE SERPL-MCNC: 99 MG/DL (ref 70–99)
HCT VFR BLD AUTO: 42.7 % (ref 40–53)
HGB BLD-MCNC: 13 G/DL (ref 13.3–17.7)
IMM GRANULOCYTES # BLD: 0 10E9/L (ref 0–0.4)
IMM GRANULOCYTES NFR BLD: 0.3 %
LYMPHOCYTES # BLD AUTO: 2.1 10E9/L (ref 0.8–5.3)
LYMPHOCYTES NFR BLD AUTO: 30.4 %
MAGNESIUM SERPL-MCNC: 2.2 MG/DL (ref 1.6–2.3)
MCH RBC QN AUTO: 30.2 PG (ref 26.5–33)
MCHC RBC AUTO-ENTMCNC: 30.4 G/DL (ref 31.5–36.5)
MCV RBC AUTO: 99 FL (ref 78–100)
MONOCYTES # BLD AUTO: 0.9 10E9/L (ref 0–1.3)
MONOCYTES NFR BLD AUTO: 13.3 %
NEUTROPHILS # BLD AUTO: 3.6 10E9/L (ref 1.6–8.3)
NEUTROPHILS NFR BLD AUTO: 53.4 %
NRBC # BLD AUTO: 0 10*3/UL
NRBC BLD AUTO-RTO: 0 /100
PHOSPHATE SERPL-MCNC: 4.3 MG/DL (ref 2.5–4.5)
PLATELET # BLD AUTO: 180 10E9/L (ref 150–450)
POTASSIUM SERPL-SCNC: 3.5 MMOL/L (ref 3.4–5.3)
PROT SERPL-MCNC: 6.6 G/DL (ref 6.8–8.8)
RBC # BLD AUTO: 4.3 10E12/L (ref 4.4–5.9)
SODIUM SERPL-SCNC: 139 MMOL/L (ref 133–144)
WBC # BLD AUTO: 6.8 10E9/L (ref 4–11)

## 2019-07-23 PROCEDURE — 40000802 ZZH SITE CHECK

## 2019-07-23 PROCEDURE — 25000132 ZZH RX MED GY IP 250 OP 250 PS 637: Performed by: PHYSICIAN ASSISTANT

## 2019-07-23 PROCEDURE — 25000128 H RX IP 250 OP 636: Performed by: NURSE PRACTITIONER

## 2019-07-23 PROCEDURE — G0378 HOSPITAL OBSERVATION PER HR: HCPCS

## 2019-07-23 PROCEDURE — 99217 ZZC OBSERVATION CARE DISCHARGE: CPT | Mod: Z6 | Performed by: NURSE PRACTITIONER

## 2019-07-23 PROCEDURE — 83735 ASSAY OF MAGNESIUM: CPT | Performed by: PHYSICIAN ASSISTANT

## 2019-07-23 PROCEDURE — 36415 COLL VENOUS BLD VENIPUNCTURE: CPT | Performed by: PHYSICIAN ASSISTANT

## 2019-07-23 PROCEDURE — 80053 COMPREHEN METABOLIC PANEL: CPT | Performed by: PHYSICIAN ASSISTANT

## 2019-07-23 PROCEDURE — 25800030 ZZH RX IP 258 OP 636: Performed by: PHYSICIAN ASSISTANT

## 2019-07-23 PROCEDURE — 40000558 ZZH STATISTIC CVC DRESSING CHANGE

## 2019-07-23 PROCEDURE — 00000146 ZZHCL STATISTIC GLUCOSE BY METER IP

## 2019-07-23 PROCEDURE — 25000131 ZZH RX MED GY IP 250 OP 636 PS 637: Performed by: PHYSICIAN ASSISTANT

## 2019-07-23 PROCEDURE — 84100 ASSAY OF PHOSPHORUS: CPT | Performed by: PHYSICIAN ASSISTANT

## 2019-07-23 PROCEDURE — 85025 COMPLETE CBC W/AUTO DIFF WBC: CPT | Performed by: PHYSICIAN ASSISTANT

## 2019-07-23 RX ORDER — CEPHALEXIN 500 MG/1
500 CAPSULE ORAL 3 TIMES DAILY
Qty: 21 CAPSULE | Refills: 0 | Status: SHIPPED | OUTPATIENT
Start: 2019-07-23 | End: 2019-07-25

## 2019-07-23 RX ADMIN — SUCRALFATE 1 G: 1 SUSPENSION ORAL at 08:41

## 2019-07-23 RX ADMIN — LORAZEPAM 1 MG: 2 SOLUTION, CONCENTRATE ORAL at 00:49

## 2019-07-23 RX ADMIN — ONDANSETRON 8 MG: 4 TABLET, ORALLY DISINTEGRATING ORAL at 00:11

## 2019-07-23 RX ADMIN — OXYCODONE HYDROCHLORIDE 10 MG: 5 SOLUTION ORAL at 08:45

## 2019-07-23 RX ADMIN — OXYCODONE HYDROCHLORIDE 10 MG: 5 SOLUTION ORAL at 00:11

## 2019-07-23 RX ADMIN — CARVEDILOL 6.25 MG: 6.25 TABLET, FILM COATED ORAL at 00:11

## 2019-07-23 RX ADMIN — SUCRALFATE 1 G: 1 SUSPENSION ORAL at 12:30

## 2019-07-23 RX ADMIN — SODIUM CHLORIDE 1000 ML: 9 INJECTION, SOLUTION INTRAVENOUS at 12:50

## 2019-07-23 RX ADMIN — CARVEDILOL 6.25 MG: 6.25 TABLET, FILM COATED ORAL at 08:41

## 2019-07-23 RX ADMIN — OXYCODONE HYDROCHLORIDE 10 MG: 5 SOLUTION ORAL at 12:50

## 2019-07-23 RX ADMIN — DEXTROSE AND SODIUM CHLORIDE: 5; 900 INJECTION, SOLUTION INTRAVENOUS at 08:45

## 2019-07-23 RX ADMIN — DEXTROAMPHETAMINE SACCHARATE, AMPHETAMINE ASPARTATE, DEXTROAMPHETAMINE SULFATE AND AMPHETAMINE SULFATE 20 MG: 2.5; 2.5; 2.5; 2.5 TABLET ORAL at 08:41

## 2019-07-23 RX ADMIN — DEXTROSE AND SODIUM CHLORIDE: 5; 900 INJECTION, SOLUTION INTRAVENOUS at 00:49

## 2019-07-23 RX ADMIN — SUCRALFATE 1 G: 1 SUSPENSION ORAL at 00:11

## 2019-07-23 RX ADMIN — ONDANSETRON 8 MG: 4 TABLET, ORALLY DISINTEGRATING ORAL at 08:45

## 2019-07-23 RX ADMIN — OXYCODONE HYDROCHLORIDE 10 MG: 5 SOLUTION ORAL at 04:13

## 2019-07-23 ASSESSMENT — ENCOUNTER SYMPTOMS
WEAKNESS: 0
NUMBNESS: 1
MYALGIAS: 1
TROUBLE SWALLOWING: 1
DYSPHORIC MOOD: 1
APPETITE CHANGE: 1
BRUISES/BLEEDS EASILY: 0
DECREASED CONCENTRATION: 1
ACTIVITY CHANGE: 1
NAUSEA: 0

## 2019-07-23 ASSESSMENT — PAIN DESCRIPTION - DESCRIPTORS
DESCRIPTORS: ACHING
DESCRIPTORS: ACHING;SORE

## 2019-07-23 NOTE — TELEPHONE ENCOUNTER
"Belbuca is coming up \"Product not on Formulary\".  Please contact insurance to get a Prior Auth override for this medication.  Per Dr Milligan would like to try for a PA.      PA needed for: Belbuca 300mcg buccal film (ndc [address]-52)  Insurance: [facility]  Insur phone: [phone]  Patient ID: [id number]     -Fany John, Pharm.D., [facility], [phone]    "

## 2019-07-23 NOTE — DISCHARGE SUMMARY
Outpatient/Observation goals to be met before discharge home:      -diagnostic tests and consults completed and resulted -yes  -vital signs normal or at patient baseline -yes  -IR consult completed with Cm placed and functioning -no, plan to place on Thursday as scheduled    A&O, A/VSS on RA. Pt is independent. Pt c/o of chronic abdominal pain, received oxy x2. Vascular changed/placed proper PICC dressing. All paperwork reviewed with pt and spouse, full understanding verbalized. All belongings with pt. Pt now safely off the unit.

## 2019-07-23 NOTE — PLAN OF CARE
Outpatient/Observation goals to be met before discharge home:     -diagnostic tests and consults completed and resulted -no  -vital signs normal or at patient baseline -yes  -IR consult completed with Cm placed and functioning -no

## 2019-07-23 NOTE — H&P
[facility], [place]    History and Physical - ED Observation Service       Date of Admission:  7/22/2019    Assessment & Plan   Parker Acevedo Sr. is a 48 year old male admitted on 7/22/2019. 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      1. RUE PICC Line Malfunction: PICC line has been pulled out ~5 to 6 cm for some time. Noticed right upper arm swolling with TPN infusions causing him pain. Pus discharge from site 2 days ago but none now.  Last TPN infusion was 2 days ago. This PICC placed 5/16/19. Has an appointment to have a Cm placed on 25th. No fevers or chills, arm redness or blanching. In ED, VSS, afebrile. Labs show normal CMP, CBC, INR.  CXR shows stable position of the right arm PICC.  Doppler venous ultrasound of right upper extremity without evidence of DVT.  In ED patient was given 1 L NS bolus, Dilaudid 0.5 mg IV x1, Zofran 4 mg IV x1, GI cocktail p.o. x1.    -IR interventional radiology consult for Cm placement  -IVF with D5 NS at 125 mL/h  -Clears after midnight; NPO at 4am    2. Short Gut Syndrome, Chronic Malnutrition, TPN Dependent: TPN dependent x 4 years per patient. Usual TPN schedule is 14 hours, 8 PM to 10 AM, with a volume of 2050 mL. Takes very minimal by mouth. GJ tube is used for venting. Otherwise he has severe nausea and vomiting.   -Continue PTA Zofran, Carafate  -IVF with D5 NS at 125 mL/h  -BG q4h  -Resume home TPN after line placed  -AM labs for TPN preparation     Chronic Medical Problems:  # Anxiety: - Continue PTA lorazepam qhs  # Attention Deficit Hyperactivity Disorder: -  Continue PTA amphetamine-dextroamphetamine (Adderall)  # Chronic Pain: Patient has chronic abdominal pain  - Continue PTA oxycodone  # History of Cardiomyopathy: - Continue with PTA Carvedilol      Diet: Advance Diet as Tolerated: Regular Diet Adult    DVT Prophylaxis: Low Risk/Ambulatory with no VTE prophylaxis indicated  Sanchez Catheter: not present  Code Status: Full Code      Disposition Plan   Expected discharge: Tomorrow, recommended to prior living arrangement once Cm placed and functioning.  Entered: DENNIS Fernandez 07/23/2019, 2:12 AM     The patient's care was discussed with the Attending Physician, Dr. Jiang.    DENNIS Fernandez  [facility], [place]  Ascom: [phone]  Please see sticky note for cross cover information  ______________________________________________________________________    Chief Complaint   PICC line problems    History is obtained from the patient    History of Present Illness   Parker Acevedo . is a 48 year old male with a history of Celestino-en-Y gastric bypass complicated by marginal ulceration requiring multiple revisions subsequently, lap corey, gastrojejunostomy, gastrectomy, appendectomy, chronic abdominal pain, PUD, severe malnutrition, short gut syndrome on chronic TPN with h/o recurrent bacteremia who presented to the ED due to concern for his right PICC line being pulled out and arm swelling with infusion.  Patient reports that his PICC line has been pulled out about 5 to 6 cm for some time.  He noticed that right upper arm was getting swollen with TPN infusions causing him pain.  His last TPN infusion was 2 days ago. His wife reports some pus discharge also from the site 2 days ago but none now. This PICC was placed 5/16/19. He has been TPN dependent for 4 years and in that time period has been through several central lines including [facility], [facility]. He originally had an appointment to have a Cm placed on 25th. Charlette  any fevers or chills, arm redness or blanching. Usual TPN schedule is 14 hours, 8 PM to 10 AM, with a volume of 2050 mL. He takes very minimal by mouth. His GJ tube is used for venting.     In the ED, HR 60s, BP 120s-150s/70s-90s, RR 14-18, SaO2  % on RA, Temp 98.7. Labs show normal CMP, CBC, INR.  CXR shows stable position of the right arm PICC.  Doppler venous ultrasound of right upper extremity without evidence of DVT.  CT facial bones negative.  CT head negative. In ED patient was given 1 L NS bolus, Dilaudid 0.5 mg IV x1, Zofran 4 mg IV x1, GI cocktail p.o. x1.  Patient is being admitted to the observation unit for IV hydration and Cm placement by IR in the morning.    Review of Systems    The 10 point Review of Systems is negative other than noted in the HPI or here.    Past Medical History    I have reviewed this patient's medical history and updated it with pertinent information if needed.   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)      Chronic abdominal pain      Complication of anesthesia      Difficulty swallowing      Gastric ulcer, unspecified as acute or chronic, without mention of hemorrhage, perforation, or obstruction      Gastro-oesophageal reflux disease      Generalized weakness 1/30/2018     Head injury      Hiatal hernia      Other bladder disorder      Other chronic pain      PONV (postoperative nausea and vomiting)      Severe malnutrition (H)     TPN     Short gut syndrome      Tobacco abuse        Past Surgical History   I have reviewed this patient's surgical history and updated it with pertinent information if needed.  Past Surgical History:   Procedure Laterality Date     AMPUTATION       APPENDECTOMY       BACK SURGERY  11/3/2014    curve in the spine     BIOPSY  LYMPH NODE CERVICAL N/A 2/20/2015    Procedure: BIOPSY LYMPH NODE CERVICAL;  Surgeon: Baron Scanlon MD;  Location: [facility]     C GASTRIC BYPASS,OBESE<100CM SHAYLEE-EN-Y  2002    lost 300 pounds     CHOLECYSTECTOMY       DISCECTOMY, FUSION CERVICAL ANTERIOR ONE LEVEL, COMBINED N/A 2/15/2017    Procedure: COMBINED DISCECTOMY, FUSION CERVICAL ANTERIOR ONE LEVEL;  Surgeon: Darren Campos MD;  [facility]     ENDOSCOPIC INSERTION TUBE GASTROSTOMY  9/9/2013    Procedure: ENDOSCOPIC INSERTION TUBE GASTROSTOMY;;  Surgeon: Francis Vyas MD;  [facility]     ENDOSCOPIC ULTRASOUND UPPER GASTROINTESTINAL TRACT (GI)  4/29/2011    Procedure:ENDOSCOPIC ULTRASOUND UPPER GASTROINTESTINAL TRACT (GI); Both Procedures done Conjointly; Surgeon:NEREIDA HOUSER; [facility]     ENDOSCOPIC ULTRASOUND UPPER GASTROINTESTINAL TRACT (GI)  9/9/2013    Procedure: ENDOSCOPIC ULTRASOUND UPPER GASTROINTESTINAL TRACT (GI);  Endoscopic Ultrasound Guide Gastrostomy Tube Placement  C-arm;  Surgeon: Noe Lizarraga MD;  [facility]     ENDOSCOPY  03/25/11    EGD, [facility]     ENDOSCOPY  08/04/09    Upper Endoscopy, [facility]     ENDOSCOPY  01/05/09    Upper Endoscopy, [facility]     ESOPHAGOSCOPY, GASTROSCOPY, DUODENOSCOPY (EGD), COMBINED  4/20/2011    Procedure:COMBINED ESOPHAGOSCOPY, GASTROSCOPY, DUODENOSCOPY (EGD); Surgeon:FRANCIS VYAS; [facility]     ESOPHAGOSCOPY, GASTROSCOPY, DUODENOSCOPY (EGD), COMBINED  6/15/2011    Procedure:COMBINED ESOPHAGOSCOPY, GASTROSCOPY, DUODENOSCOPY (EGD); Surgeon:FRANCIS VYAS; [facility]     ESOPHAGOSCOPY, GASTROSCOPY, DUODENOSCOPY (EGD), COMBINED  6/12/2013    Procedure: COMBINED ESOPHAGOSCOPY, GASTROSCOPY, DUODENOSCOPY (EGD);;  Surgeon: Francis Vyas MD;  [facility]     ESOPHAGOSCOPY, GASTROSCOPY, DUODENOSCOPY (EGD), COMBINED  11/22/2013    Procedure: COMBINED ESOPHAGOSCOPY, GASTROSCOPY, DUODENOSCOPY (EGD);;  Surgeon:  Francis Vyas MD;  [facility]     ESOPHAGOSCOPY, GASTROSCOPY, DUODENOSCOPY (EGD), COMBINED  4/30/2014    Procedure: COMBINED ESOPHAGOSCOPY, GASTROSCOPY, DUODENOSCOPY (EGD);  Surgeon: Francis Vyas MD;  [facility]     ESOPHAGOSCOPY, GASTROSCOPY, DUODENOSCOPY (EGD), COMBINED N/A 2/20/2015    Procedure: COMBINED ESOPHAGOSCOPY, GASTROSCOPY, DUODENOSCOPY (EGD), BIOPSY SINGLE OR MULTIPLE;  Surgeon: Baron Scanlon MD;  [facility]     ESOPHAGOSCOPY, GASTROSCOPY, DUODENOSCOPY (EGD), COMBINED N/A 9/30/2015    Procedure: COMBINED ESOPHAGOSCOPY, GASTROSCOPY, DUODENOSCOPY (EGD);  Surgeon: Francis Vyas MD;  [facility]     GASTRECTOMY  6/22/2012    Procedure: GASTRECTOMY;  Open Approach, Excise Ulcers,Partial Gastrectomy, Esophagojejunostomy, Hiatal Hernia Repair, Extensive Lysis of Adhesions and Esaphagogastrodudenoscopy.;  Surgeon: Francis Vyas MD;  [facility]     GASTROJEJUNOSTOMY  08/26/09    Extensice enterolysis, partial resect. jejunum, part. resect gastric pouch, gastrojejunostomy anastomosis     HC ESOPH/GAS REFLUX TEST W NASAL IMPED ELECTRODE  8/5/2013    Procedure: ESOPHAGEAL IMPEDENCE FUNCTION TEST 1 HOUR OR LESS;  Surgeon: Halie Lang MD;  Location:  GI     HEAD & NECK SURGERY  2/15/2017    C5-C6     HERNIA REPAIR  2006    Umbilical hernia     HERNIORRHAPHY HIATAL  6/22/2012    Procedure: HERNIORRHAPHY HIATAL;;  Surgeon: Francis Vyas MD;  [facility]     HERNIORRHAPHY INGUINAL  11/22/2013    Procedure: HERNIORRHAPHY INGUINAL;;  Surgeon: Francis Vyas MD;  [facility]     INSERT PORT VASCULAR ACCESS Right 12/19/2017    Procedure: INSERT PORT VASCULAR ACCESS;  Right Chest Port Placement ;  Surgeon: Lisandro Alejandro PA-C;  Location:  OR     INSERT PORT VASCULAR ACCESS Right 8/2/2018    Procedure: INSERT PORT VASCULAR ACCESS;  Place single lumen tunneled central venous access catheter;  Surgeon: Guy Jamil  BRITTANY Odonnell;  [facility]     IR PICC PLACEMENT > 5 YRS OF AGE  3/7/2019     LAPAROTOMY EXPLORATORY  2013    Procedure: LAPAROTOMY EXPLORATORY;  Exploratory Laparotomy, Upper Endoscopy, Left Inguinal Hernia Repair;  Surgeon: Francis Vyas MD;  [facility]     ORTHOPEDIC SURGERY       PICC INSERTION Right 2017    5fr DL BioFlo PICC, 42cm (3cm external) in the R medial brachial vein w/ tip in the SVC RA junction.     PICC INSERTION Left 2017    5fr DL BioFlo PICC, 45cm (1cm external) in the L basilic vein w/ tip in the SVC RA junction.     PICC INSERTION Right 2019    5Fr - 43cm, Medial brachial vein, low SVC     SHAYLEE EN Y BOWEL  2003     SOFT TISSUE SURGERY       SOFT TISSUE SURGERY       THORACIC SURGERY       TONSILLECTOMY       TRANSESOPHAGEAL ECHOCARDIOGRAM INTRAOPERATIVE N/A 2019    Procedure: TRANSESOPHAGEAL ECHOCARDIOGRAM INTRAOPERATIVE;  Surgeon: GENERIC ANESTHESIA PROVIDER;  [facility]       Social History   I have reviewed this patient's social history and updated it with pertinent information if needed.  Social History     Tobacco Use     Smoking status: Current Some Day Smoker     Packs/day: 0.25     Years: 3.00     Pack years: 0.75     Types: Cigarettes     Last attempt to quit: 2018     Years since quittin.9     Smokeless tobacco: Never Used     Tobacco comment: I use an e cig every now and than   Substance Use Topics     Alcohol use: No     Comment: quit      Drug use: No       Family History   I have reviewed this patient's family history and updated it with pertinent information if needed.   Family History   Problem Relation Age of Onset     Gastrointestinal Disease Mother         Crohns disease     Anxiety Disorder Mother      Thyroid Disease Mother         Grave's disease     Cancer Father         ear cancer-skin cancer/melanoma     Breast Cancer Maternal Grandmother      Macular Degeneration Maternal Grandfather      Anxiety Disorder  "Sister      Diabetes Maternal Uncle      Breast Cancer Other      Hypertension No family hx of      Hyperlipidemia No family hx of      Cerebrovascular Disease No family hx of      Prostate Cancer No family hx of      Depression No family hx of      Anesthesia Reaction No family hx of      Asthma No family hx of      Osteoporosis No family hx of      Genetic Disorder No family hx of      Obesity No family hx of      Mental Illness No family hx of      Substance Abuse No family hx of      Glaucoma No family hx of        Prior to Admission Medications   Prior to Admission Medications   Prescriptions Last Dose Informant Patient Reported? Taking?   B-D TB SYRINGE 27G X 1/2\" 1 ML MISC   Yes No   CARAFATE 1 GM/10ML suspension   Yes No   [place] [facility] MANAGED PATIENT   No No   Sig: Contact [facility] for patient specific medication information at [phone] on admission and discharge from the hospital.  Phones are answered 24 hours a day 7 days a week 365 days a year.    Providers - Choose \"CONTINUE HOME MED (no script)\" at discharge if patient treatment with home infusion will continue.   LORazepam (ATIVAN) 1 MG tablet   No No   Si-2 mg as needed for anxiety with TPN and medications   Needle, Disp, (BD DISP NEEDLES) 27G X 1/2\" MISC   No No   Si Device every 30 days Use for cyanocobalamin injection once q 30 days.   UNABLE TO FIND   Yes No   Sig: States takes TPN 2050 ml  Every 14 hours throught PICC   acetaminophen (TYLENOL) 32 mg/mL liquid   Yes No   Sig: Take 500 mg by mouth every 4 hours as needed for fever or mild pain   albuterol (PROAIR HFA/PROVENTIL HFA/VENTOLIN HFA) 108 (90 Base) MCG/ACT Inhaler   No No   Sig: Inhale 2 puffs into the lungs every 4 hours as needed for shortness of breath / dyspnea or wheezing   amphetamine-dextroamphetamine (ADDERALL) 20 MG tablet   No No   Sig: Take 1 tablet (20 mg) by mouth daily   blood glucose (NO BRAND SPECIFIED) lancets standard   No No   Sig: " Use to test blood sugar 1 times daily or as directed.   blood glucose (NO BRAND SPECIFIED) test strip   No No   Sig: Use to test blood sugar 1 times daily or as directed.   blood glucose monitoring (NO BRAND SPECIFIED) meter device kit   No No   Sig: Use to test blood sugar 1 times daily or as directed.   carvedilol (COREG) 6.25 MG tablet   Yes No   Sig: Take 6.25 mg by mouth 2 times daily (with meals)   diphenhydrAMINE (BENADRYL) 12.5 MG/5ML solution   No No   Sig: Take 10 mLs (25 mg) by mouth 3 times daily as needed for itching (Prior to vanco administration)   lidocaine (LIDODERM) 5 % Patch   No No   Sig: Place 1-2 patches onto the skin every 24 hours Wear for 12 hours, remove for 12 hours.  OK to cut to better fit to size.   magic mouthwash (FIRST-MOUTHWASH BLM) compounding kit   No No   Sig: Swish and swallow 10 mLs in mouth every 6 hours as needed for mouth sores   naloxone (NARCAN) 4 MG/0.1ML nasal spray   Yes No   Sig: Spray 4 mg into one nostril alternating nostrils once as needed every 2-3 minutes until assistance arrives   ondansetron (ZOFRAN-ODT) 8 MG ODT tab   No No   Sig: DISSOLVE ONE TABLET ON TONGUE EVERY 8 HOURS AS NEEDED FOR NAUSEA   order for DME   No No   Sig: Injection Supplies for Vitamin B12: 3cc syringes w/ 27 gauge needles, 1/2 inch length   order for DME   No No   Sig: Equipment being ordered: Bilateral knee high chronic venous insufficiency stockings--  mild-moderate pressures.   oxyCODONE (ROXICODONE) 5 MG/5ML solution   No No   Sig: Take 10-15 mLs (10-15 mg) by mouth every 4 hours as needed for pain Max 60 mg/day. Fill 7/29/19 to start on 7/31/19.   polyethylene glycol (MIRALAX/GLYCOLAX) powder   No No   Sig: Take 17 g (1 capful) by mouth daily   sodium chloride 0.9% infusion   Yes No   Sig: Inject 1,000-2,000 mLs into the vein as needed    vitamin B-12 (CYANOCOBALAMIN) 1000 MCG/ML injection   No No   Sig: Inject 1 mL (1,000 mcg) into the muscle every 30 days   vitamin D2  (ERGOCALCIFEROL) 88640 units (1250 mcg) capsule   No No   Sig: Take 1 capsule (50,000 Units) by mouth once a week      Facility-Administered Medications: None     Allergies   Allergies   Allergen Reactions     Bactrim [Sulfamethoxazole W/Trimethoprim] Rash     Penicillins Anaphylaxis     Please see Antimicrobial Management Team allergy assessment note 10/10/2018. Patient reported tolerating amoxicillin.     Doxycycline Rash     Vancomycin Rash     Rash after receiving vancomycin 3/28/16 (red man's?). Tolerated with slower infusion and diphenhydramine premed.       Physical Exam   Vital Signs: Temp: 98.7  F (37.1  C) Temp src: Oral BP: (!) 134/94 Pulse: 68   Resp: 16 SpO2: 98 % O2 Device: None (Room air)    Weight: 186 lbs 0 oz    Constitutional: awake, alert, cooperative, no apparent distress, and appears stated age  Eyes: Lids and lashes normal, pupils equal, round and reactive to light, extra ocular muscles intact, sclera clear, conjunctiva normal  ENT: Normocephalic, without obvious abnormality, atraumatic, sinuses nontender on palpation, external ears without lesions, oral pharynx with moist mucous membranes, tonsils without erythema or exudates, gums normal and good dentition.  Hematologic / Lymphatic: no cervical lymphadenopathy  Respiratory: No increased work of breathing, good air exchange, clear to auscultation bilaterally, no crackles or wheezing  Cardiovascular: Normal apical impulse, regular rate and rhythm, normal S1 and S2, no S3 or S4, and no murmur noted  GI: No scars, normal bowel sounds, soft, non-distended, non-tender, no masses palpated, no hepatosplenomegally. Healed scars. GJ tube present  Skin: RUE PICC line with catheter out of skin ~ 5cm  Musculoskeletal: There is no redness, warmth, or swelling of the joints.  Full range of motion noted.  Motor strength is 5 out of 5 all extremities bilaterally.  Tone is normal.  Neurologic: Awake, alert, oriented to name, place and time.  Cranial nerves  II-XII are grossly intact.  Motor is 5 out of 5 bilaterally.  Cerebellar finger to nose, heel to shin intact.  Sensory is intact.  Babinski down going, Romberg negative, and gait is normal.  Neuropsychiatric: General: normal, calm and normal eye contact    Data   Data reviewed today: I reviewed all medications, new labs and imaging results over the last 24 hours.   Recent Labs   Lab 07/22/19 1820 07/16/19  1300   WBC 7.8 5.8   HGB 13.5 11.9*   MCV 96 98    166   INR 1.10  --     147*   POTASSIUM 3.6 3.7   CHLORIDE 107 109   CO2 29 26   BUN 7 10   CR 0.60* 0.51*   ANIONGAP 7 12   SILAS 8.4* 8.4*   GLC 91 143*   ALBUMIN 3.4  --    PROTTOTAL 6.6*  --    BILITOTAL 0.5 0.4   ALKPHOS 94  --    ALT 29  --    AST 18  --      Most Recent 3 CBC's:  Recent Labs   Lab Test 07/22/19 1820 07/16/19  1300 07/10/19  1215   WBC 7.8 5.8 Canceled, Test credited   HGB 13.5 11.9* Canceled, Test credited   MCV 96 98 Canceled, Test credited    166 Canceled, Test credited     Most Recent 3 BMP's:  Recent Labs   Lab Test 07/22/19 1820 07/16/19  1300 06/28/19  1345    147* 142   POTASSIUM 3.6 3.7 3.8   CHLORIDE 107 109 107   CO2 29 26 29   BUN 7 10 11   CR 0.60* 0.51* 0.73   ANIONGAP 7 12 6   SILAS 8.4* 8.4* 8.3*   GLC 91 143* 91     Most Recent 2 LFT's:  Recent Labs   Lab Test 07/22/19 1820 07/16/19  1300 06/28/19  1345   AST 18  --  18   ALT 29  --  31   ALKPHOS 94  --  102   BILITOTAL 0.5 0.4 0.2     Most Recent 3 INR's:  Recent Labs   Lab Test 07/22/19 1820 05/14/19  0620 01/09/19  0604   INR 1.10 1.17* 1.18*     Recent Results (from the past 24 hour(s))   Chest XR,  PA & LAT    Narrative    XR CHEST 2 VW7/22/2019 3:02 PM    INDICATION: picc line evaluation    COMPARISON:  5/24/2019    FINDINGS: PA and lateral views of the chest. Right arm PICC projects  with tip at the mid SVC. Cardiac silhouette is stable from prior.  Cervical hardware. No focal pulmonary opacity. Upper abdomen is  unremarkable. No acute  osseous lesions.      Impression    IMPRESSION: Stable position of the right arm PICC with otherwise  stable chest.    I have personally reviewed the examination and initial interpretation  and I agree with the findings.    MEGAN CANALES MD   US Upper Extremity Venous Duplex Right    Narrative    EXAMINATION: DOPPLER VENOUS ULTRASOUND OF THE RIGHT UPPER EXTREMITY,  7/22/2019 3:06 PM     COMPARISON: 1/4/2019    HISTORY: PICC with swelling.    TECHNIQUE:  Gray-scale evaluation with compression, spectral flow and  color Doppler assessment of the deep venous system of the right upper  extremity.    FINDINGS:  Right: Normal blood flow and waveforms are demonstrated in the  internal jugular, innominate, subclavian, and axillary veins. There is  normal compressibility of the brachial, basilic and cephalic veins.  PICC visualized in the right subclavian, axillary and brachial veins.      Impression    IMPRESSION:  No evidence of right upper extremity deep venous  thrombosis.    KENNETH MINOR MD   CT Facial Bones without Contrast    Narrative    CT FACIAL BONES WITHOUT CONTRAST 7/22/2019 2:42 PM    History:  left facial trauma    Comparison:  None available.      Technique: Using thin collimation multidetector helical acquisition  technique, axial and coronal thin section CT images were reconstructed  through the facial bones. Images were reviewed in bone and soft tissue  windows.    Findings:  There is no significant soft tissue swelling of the face.  There is no evident fracture of the facial bones. The cribriform plate  appears intact. Alignment of the facial bones appears normal.     There is no hematoma, soft tissue mass or gas visualized within the  orbits. The visualized portions of the paranasal sinuses are clear.       Impression    Impression: Normal CT study of the facial bones.          SERGEI RAMIREZ MD   Head CT w/o contrast    Narrative    CT HEAD W/O CONTRAST 7/22/2019 2:42 PM    Provided History:  trauma  ICD-10: Trauma    Comparison: None available.    Technique: Using multidetector thin collimation helical acquisition  technique, axial, coronal and sagittal CT images from the skull base  to the vertex were obtained without intravenous contrast.     Findings:    No intracranial hemorrhage, mass effect, or midline shift. The  ventricles are proportionate to the cerebral sulci. The gray to white  matter differentiation of the cerebral hemispheres is preserved. The  basal cisterns are patent.    The visualized paranasal sinuses are clear. The mastoid air cells are  clear.       Impression    Impression: No acute intracranial pathology.    SERGEI RAMIREZ MD

## 2019-07-23 NOTE — ED NOTES
Blood return noted on R PICC line, flushed with 10cc NS - pt reports pain and tenderness with flush. MD aware.

## 2019-07-23 NOTE — ED NOTES
[facility], [place]   ED Nurse to Floor Handoff     Parker Acevedo . is a 48 year old male who speaks English and lives with a spouse,  in a home  They arrived in the ED by car from home    ED Chief Complaint: Vascular Access Problem    ED Dx;   Final diagnoses:   Dehydration   PICC line infiltration, initial encounter (H)   Short gut syndrome         Needed?: No    Allergies:   Allergies   Allergen Reactions     Bactrim [Sulfamethoxazole W/Trimethoprim] Rash     Penicillins Anaphylaxis     Please see Antimicrobial Management Team allergy assessment note 10/10/2018. Patient reported tolerating amoxicillin.     Doxycycline Rash     Vancomycin Rash     Rash after receiving vancomycin 3/28/16 (red man's?). Tolerated with slower infusion and diphenhydramine premed.   .  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)      Chronic abdominal pain      Complication of anesthesia      Difficulty swallowing      Gastric ulcer, unspecified as acute or chronic, without mention of hemorrhage, perforation, or obstruction      Gastro-oesophageal reflux disease      Generalized weakness 1/30/2018     Head injury      Hiatal hernia      Other bladder disorder      Other chronic pain      PONV (postoperative nausea and vomiting)      Severe malnutrition (H)     TPN     Short gut syndrome      Tobacco abuse       Baseline Mental status: WDL  Current Mental Status changes: at basesline    Infection present or suspected this encounter: no  Sepsis suspected: No  Isolation type: No active isolations     Activity level - Baseline/Home:  Independent  Activity Level - Current:   Independent    Bariatric equipment needed?: No    In the ED these meds were given:    Medications   lidocaine 1 % 0.1-1 mL (has no administration in time range)   lidocaine (LMX4) cream (has no administration in time range)   sodium chloride (PF) 0.9% PF flush 3 mL (has no administration in time range)   sodium chloride (PF) 0.9% PF flush 3 mL (3 mLs Intracatheter Not Given 7/22/19 2041)   0.9% sodium chloride BOLUS (0 mLs Intravenous Stopped 7/22/19 2040)     Followed by   sodium chloride 0.9% infusion (has no administration in time range)   ondansetron (ZOFRAN) injection 4 mg (4 mg Intravenous Given 7/22/19 1826)   sucralfate (CARAFATE) suspension 1 g (has no administration in time range)   HYDROmorphone (PF) (DILAUDID) injection 0.5 mg (0.5 mg Intravenous Given 7/22/19 1820)   lidocaine HCl (XYLOCAINE) 2 % 15 mL, alum & mag hydroxide-simethicone (MYLANTA ES/MAALOX  ES) 15 mL GI Cocktail (30 mLs Oral Given 7/22/19 2029)       Drips running?  No    Home pump  Yes, pt has PICC line and g/j tube use for venting    Current LDAs  Peripheral IV 07/22/19 (Active)   Site Assessment WDL 7/22/2019  6:15 PM   Number of days: 0       PICC Double Lumen 05/16/19 Right Brachial vein medial (Active)   Number of days: 67       Gastrostomy/Enterostomy Gastrostomy LUQ 1 18 fr (Active)   Number of days: 2573       Wound 06/05/18 Left;Posterior;Mid;Inner;Lower Arm Ulceration Bruised w/ an unopened sore (Active)   Number of days: 412       Labs results:   Labs Ordered and Resulted from Time of ED Arrival Up to the Time of Departure from the ED   CBC WITH PLATELETS DIFFERENTIAL - Abnormal; Notable for the following components:       Result Value    MCHC 31.4 (*)     All other components within normal limits   COMPREHENSIVE METABOLIC PANEL - Abnormal; Notable for the following components:    Creatinine 0.60 (*)     Calcium 8.4 (*)     Protein Total 6.6 (*)     All other components within normal limits   INR   PARTIAL THROMBOPLASTIN TIME   PERIPHERAL IV CATHETER       Imaging Studies:   Recent Results (from the past 24  hour(s))   Chest XR,  PA & LAT    Narrative    XR CHEST 2 VW7/22/2019 3:02 PM    INDICATION: picc line evaluation    COMPARISON:  5/24/2019    FINDINGS: PA and lateral views of the chest. Right arm PICC projects  with tip at the mid SVC. Cardiac silhouette is stable from prior.  Cervical hardware. No focal pulmonary opacity. Upper abdomen is  unremarkable. No acute osseous lesions.      Impression    IMPRESSION: Stable position of the right arm PICC with otherwise  stable chest.    I have personally reviewed the examination and initial interpretation  and I agree with the findings.    MEGAN CANALES MD   US Upper Extremity Venous Duplex Right    Narrative    EXAMINATION: DOPPLER VENOUS ULTRASOUND OF THE RIGHT UPPER EXTREMITY,  7/22/2019 3:06 PM     COMPARISON: 1/4/2019    HISTORY: PICC with swelling.    TECHNIQUE:  Gray-scale evaluation with compression, spectral flow and  color Doppler assessment of the deep venous system of the right upper  extremity.    FINDINGS:  Right: Normal blood flow and waveforms are demonstrated in the  internal jugular, innominate, subclavian, and axillary veins. There is  normal compressibility of the brachial, basilic and cephalic veins.  PICC visualized in the right subclavian, axillary and brachial veins.      Impression    IMPRESSION:  No evidence of right upper extremity deep venous  thrombosis.    KENNETH MINOR MD   CT Facial Bones without Contrast    Narrative    CT FACIAL BONES WITHOUT CONTRAST 7/22/2019 2:42 PM    History:  left facial trauma    Comparison:  None available.      Technique: Using thin collimation multidetector helical acquisition  technique, axial and coronal thin section CT images were reconstructed  through the facial bones. Images were reviewed in bone and soft tissue  windows.    Findings:  There is no significant soft tissue swelling of the face.  There is no evident fracture of the facial bones. The cribriform plate  appears intact. Alignment of the facial  "bones appears normal.     There is no hematoma, soft tissue mass or gas visualized within the  orbits. The visualized portions of the paranasal sinuses are clear.       Impression    Impression: Normal CT study of the facial bones.          SERGEI RAMIREZ MD   Head CT w/o contrast    Narrative    CT HEAD W/O CONTRAST 7/22/2019 2:42 PM    Provided History: trauma  ICD-10: Trauma    Comparison: None available.    Technique: Using multidetector thin collimation helical acquisition  technique, axial, coronal and sagittal CT images from the skull base  to the vertex were obtained without intravenous contrast.     Findings:    No intracranial hemorrhage, mass effect, or midline shift. The  ventricles are proportionate to the cerebral sulci. The gray to white  matter differentiation of the cerebral hemispheres is preserved. The  basal cisterns are patent.    The visualized paranasal sinuses are clear. The mastoid air cells are  clear.       Impression    Impression: No acute intracranial pathology.    SERGEI RAMIREZ MD       Recent vital signs:   /76   Pulse 69   Temp 97.8  F (36.6  C)   Resp 14   Ht 1.803 m (5' 11\")   Wt 84.4 kg (186 lb)   SpO2 100%   BMI 25.94 kg/m      [place] Coma Scale Score: 15 (07/22/19 1845)       Cardiac Rhythm: Normal Sinus  Pt needs tele? No  Skin/wound Issues: have not completed full skin assessment    Code Status: Full Code    Pain control: fair    Nausea control: fair    Abnormal labs/tests/findings requiring intervention:     Family present during ED course? Yes   Family Comments/Social Situation comments: wife at bedside    Tasks needing completion: None    Kathrine Hong RN    [phone] [facility]    "

## 2019-07-23 NOTE — TELEPHONE ENCOUNTER
Called and spoke with patient and wife, at hospital.  Hospital room # is [phone].  Discussed my concerns and risks.  While he admits to doing this, I don't feel that he understands the gravity of the situation or won't do this again.    Therefore I am recommending a transition to [place].  This is buprenorphine product that will bypass the GI system and also may allow them to have better management of meds.    Called pharmacy. Not on formulary.  Will need PA.  She is sending that.  They would also need to get it into pharmacy.    I was able to get the current oxycodone released early.  They will only fill 900ml right now (at my request) as this gives us 15 day supply to be able to work on alternative plan with better safety.    Jessica Milligan MD  [facility]

## 2019-07-23 NOTE — DISCHARGE SUMMARY
[facility], [place]  Hospitalist Discharge Summary       Date of Admission:  7/22/2019  Date of Discharge:  7/23/2019  Discharging Provider: SAILAJA Roque CNP  Discharge Team: Emergency Department Observation Unit    Discharge Diagnoses   PICC line malfunction    Follow-ups Needed After Discharge   Follow-up Appointments     Adult [facility] Follow-up and recommended labs and tests      Follow up with IR on Thursday for PICC replacement. Follow up with your   primary care doctor as needed. Return to the ED of you have     Appointments on [place] and/or [facility] (with [facility] or [facility]   provider or service). Call [phone] if you haven't heard regarding   these appointments within 7 days of discharge.         {Additional follow-up instructions/to-do's for PCP    : Hospital follow up    Unresulted Labs Ordered in the Past 30 Days of this Admission     No orders found for last 31 day(s).      These results will be followed up by PCP    Discharge Disposition   Discharged to home  Condition at discharge: Stable    Hospital Course      Parker Prettyvignesh Louie is a 48 year old male admitted on 7/22/2019. 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      1. RUE PICC Line Malfunction: PICC line has been pulled out ~5 to 6 cm for some time. Noticed right upper arm swolling with TPN infusions causing him pain. Pus discharge from site 2 days ago but none now.  Last TPN infusion was 2 days ago. This PICC placed 5/16/19. Has an appointment to have a Cm placed on 25th. IR called and reported PICC in correct position. PICC line  with positive blood return and flushing. Trialed 500 ml bolus without swelling noted in arm. Patient was discharged to home to have PICC line replaced as scheduled    2. Short Gut Syndrome, Chronic Malnutrition, TPN Dependent: TPN dependent x 4 years per patient. Usual TPN schedule is 14 hours, 8 PM to 10 AM, with a volume of 2050 mL. Takes very minimal by mouth. GJ tube is used for venting. Otherwise he has severe nausea and vomiting.     Chronic Medical Problems:  # Anxiety: - Continue PTA lorazepam qhs  # Attention Deficit Hyperactivity Disorder: - Continue PTA amphetamine-dextroamphetamine (Adderall)  # Chronic Pain: Patient has chronic abdominal pain  - Continue PTA oxycodone  # History of Cardiomyopathy: - Continue with PTA Carvedilol     Consultations This Hospital Stay   INTERVENTIONAL RADIOLOGY ADULT/PEDS IP CONSULT  VASCULAR ACCESS CARE ADULT IP CONSULT    Code Status   Full Code    Time Spent on this Encounter   I, Fifi Holley, personally saw the patient today and spent less than or equal to 30 minutes discharging this patient.       SAILAJA Roque [facility]  ______________________________________________________________________    Physical Exam   Vital Signs: Temp: 97.9  F (36.6  C) Temp src: Oral BP: (!) 167/100 Pulse: 70   Resp: 16 SpO2: 99 % O2 Device: None (Room air)    Weight: 186 lbs 0 oz  Constitutional: awake, alert, cooperative, no apparent distress, and appears stated age  Eyes: Lids and lashes normal, pupils equal, round and reactive to light, extra ocular muscles intact, sclera clear, conjunctiva normal  ENT: Normocephalic, without obvious abnormality, atraumatic, sinuses nontender on palpation, external ears without lesions, oral pharynx with moist mucous membranes, tonsils without erythema or exudates, gums normal and good dentition.  Hematologic / Lymphatic: no cervical lymphadenopathy  Respiratory: No increased work of breathing, good  air exchange, clear to auscultation bilaterally, no crackles or wheezing  Cardiovascular: Normal apical impulse, regular rate and rhythm, normal S1 and S2, no S3 or S4, and no murmur noted  GI: No scars, normal bowel sounds, soft, non-distended, non-tender, no masses palpated, no hepatosplenomegally. Healed scars. GJ tube present  Skin: RUE PICC line with catheter out of skin ~ 5cm  Musculoskeletal: There is no redness, warmth, or swelling of the joints.  Full range of motion noted.  Motor strength is 5 out of 5 all extremities bilaterally.  Tone is normal.  Neurologic: Awake, alert, oriented to name, place and time.  Cranial nerves II-XII are grossly intact.  Motor is 5 out of 5 bilaterally.  Cerebellar finger to nose, heel to shin intact.  Sensory is intact.  Babinski down going, Romberg negative, and gait is normal.  Neuropsychiatric: General: normal, calm and normal eye contact             Primary Care Physician   Chuy Isaac    Discharge Orders      Home infusion referral      Home care nursing referral      Reason for your hospital stay    PICC line malfunction     Adult [facility] Follow-up and recommended labs and tests    Follow up with IR on Thursday for PICC replacement. Follow up with your primary care doctor as needed. Return to the ED of you have     Appointments on [place] and/or [facility] (with [facility] or [facility] provider or service). Call [phone] if you haven't heard regarding these appointments within 7 days of discharge.     Activity    Your activity upon discharge: activity as tolerated     Discharge Instructions    You were admitted to the ED observation unit at the [facility] for PICC line malfunction. Your chest xray was reviewed by our interventional radiologist who saw the PICC line was in the right position. Saline was run through your PICC line. Recommend Following with IR to have your PICC line replaced as previously scheduled. Start Keflex three times a day for 7 days to prevent  infection at your PICC line site.     When to contact your care team    Call your healthcare provider right away if any of these occur:    Fever of 100.4 F (38 C) or higher    Drainage from the PICC site    Swelling or bulging around the PICC site    Bleeding from the PICC site    Skin pulls away from the PICC site    Redness, warmth, or pus at the PICC site    Tubing breaks, splits, or leaks    Tubing is pulled out completely    Medicine or fluids do not drain from the bag into your PICC     Diet    Follow this diet upon discharge: Regular       Significant Results and Procedures   Results for orders placed or performed during the hospital encounter of 07/22/19   Chest XR,  PA & LAT    Narrative    XR CHEST 2 VW7/22/2019 3:02 PM    INDICATION: picc line evaluation    COMPARISON:  5/24/2019    FINDINGS: PA and lateral views of the chest. Right arm PICC projects  with tip at the mid SVC. Cardiac silhouette is stable from prior.  Cervical hardware. No focal pulmonary opacity. Upper abdomen is  unremarkable. No acute osseous lesions.      Impression    IMPRESSION: Stable position of the right arm PICC with otherwise  stable chest.    I have personally reviewed the examination and initial interpretation  and I agree with the findings.    MEGAN CANALES MD   US Upper Extremity Venous Duplex Right    Narrative    EXAMINATION: DOPPLER VENOUS ULTRASOUND OF THE RIGHT UPPER EXTREMITY,  7/22/2019 3:06 PM     COMPARISON: 1/4/2019    HISTORY: PICC with swelling.    TECHNIQUE:  Gray-scale evaluation with compression, spectral flow and  color Doppler assessment of the deep venous system of the right upper  extremity.    FINDINGS:  Right: Normal blood flow and waveforms are demonstrated in the  internal jugular, innominate, subclavian, and axillary veins. There is  normal compressibility of the brachial, basilic and cephalic veins.  PICC visualized in the right subclavian, axillary and brachial veins.      Impression    IMPRESSION:   No evidence of right upper extremity deep venous  thrombosis.    KENNETH MINOR MD   Head CT w/o contrast    Narrative    CT HEAD W/O CONTRAST 7/22/2019 2:42 PM    Provided History: trauma  ICD-10: Trauma    Comparison: None available.    Technique: Using multidetector thin collimation helical acquisition  technique, axial, coronal and sagittal CT images from the skull base  to the vertex were obtained without intravenous contrast.     Findings:    No intracranial hemorrhage, mass effect, or midline shift. The  ventricles are proportionate to the cerebral sulci. The gray to white  matter differentiation of the cerebral hemispheres is preserved. The  basal cisterns are patent.    The visualized paranasal sinuses are clear. The mastoid air cells are  clear.       Impression    Impression: No acute intracranial pathology.    SERGEI RAMIREZ MD   CT Facial Bones without Contrast    Narrative    CT FACIAL BONES WITHOUT CONTRAST 7/22/2019 2:42 PM    History:  left facial trauma    Comparison:  None available.      Technique: Using thin collimation multidetector helical acquisition  technique, axial and coronal thin section CT images were reconstructed  through the facial bones. Images were reviewed in bone and soft tissue  windows.    Findings:  There is no significant soft tissue swelling of the face.  There is no evident fracture of the facial bones. The cribriform plate  appears intact. Alignment of the facial bones appears normal.     There is no hematoma, soft tissue mass or gas visualized within the  orbits. The visualized portions of the paranasal sinuses are clear.       Impression    Impression: Normal CT study of the facial bones.          SERGEI RAMIREZ MD     *Note: Due to a large number of results and/or encounters for the requested time period, some results have not been displayed. A complete set of results can be found in Results Review.       Discharge Medications   Current Discharge Medication List  "     START taking these medications    Details   cephALEXin (KEFLEX) 500 MG capsule Take 1 capsule (500 mg) by mouth 3 times daily for 7 days  Qty: 21 capsule, Refills: 0    Associated Diagnoses: PICC line infiltration, sequela         CONTINUE these medications which have NOT CHANGED    Details   acetaminophen (TYLENOL) 32 mg/mL liquid Take 500 mg by mouth every 4 hours as needed for fever or mild pain      albuterol (PROAIR HFA/PROVENTIL HFA/VENTOLIN HFA) 108 (90 Base) MCG/ACT Inhaler Inhale 2 puffs into the lungs every 4 hours as needed for shortness of breath / dyspnea or wheezing  Qty: 1 Inhaler, Refills: 3    Associated Diagnoses: Productive cough      amphetamine-dextroamphetamine (ADDERALL) 20 MG tablet Take 1 tablet (20 mg) by mouth daily  Qty: 30 tablet, Refills: 0    Associated Diagnoses: ADHD (attention deficit hyperactivity disorder), inattentive type      B-D TB SYRINGE 27G X 1/2\" 1 ML MISC       blood glucose (NO BRAND SPECIFIED) lancets standard Use to test blood sugar 1 times daily or as directed.  Qty: 100 each, Refills: 1    Associated Diagnoses: Elevated glucose level      blood glucose (NO BRAND SPECIFIED) test strip Use to test blood sugar 1 times daily or as directed.  Qty: 100 strip, Refills: 3    Associated Diagnoses: Elevated glucose level      blood glucose monitoring (NO BRAND SPECIFIED) meter device kit Use to test blood sugar 1 times daily or as directed.  Qty: 1 kit, Refills: 0    Associated Diagnoses: Elevated glucose level      Buprenorphine HCl (BELBUCA) 300 MCG FILM buccal film Place 1 Film (300 mcg) inside cheek every 12 hours  Qty: 60 Film, Refills: 0    Associated Diagnoses: Chronic abdominal pain; Severe malnutrition (H)      CARAFATE 1 GM/10ML suspension Refills: 2      carvedilol (COREG) 6.25 MG tablet Take 6.25 mg by mouth 2 times daily (with meals)      diphenhydrAMINE (BENADRYL) 12.5 MG/5ML solution Take 10 mLs (25 mg) by mouth 3 times daily as needed for itching (Prior to " "vanco administration)  Qty: 480 mL, Refills: 0    Associated Diagnoses: Bacteremia; Positive blood culture      [place] HOME INFUSION MANAGED PATIENT Contact [facility] for patient specific medication information at [phone] on admission and discharge from the hospital.  Phones are answered 24 hours a day 7 days a week 365 days a year.    Providers - Choose \"CONTINUE HOME MED (no script)\" at discharge if patient treatment with home infusion will continue.    Comments: Resume prior to admission TPN/Lipids/saline  Associated Diagnoses: S/P bariatric surgery      lidocaine (LIDODERM) 5 % Patch Place 1-2 patches onto the skin every 24 hours Wear for 12 hours, remove for 12 hours.  OK to cut to better fit to size.  Qty: 60 patch, Refills: 6    Associated Diagnoses: Chronic bilateral low back pain without sciatica; Chronic abdominal pain; Myofascial pain      LORazepam (ATIVAN) 1 MG tablet 1-2 mg as needed for anxiety with TPN and medications  Qty: 50 tablet, Refills: 0    Associated Diagnoses: Anxiety      magic mouthwash (FIRST-MOUTHWASH BLM) compounding kit Swish and swallow 10 mLs in mouth every 6 hours as needed for mouth sores  Qty: 237 mL, Refills: 0    Associated Diagnoses: Peptic ulcer disease      naloxone (NARCAN) 4 MG/0.1ML nasal spray Spray 4 mg into one nostril alternating nostrils once as needed every 2-3 minutes until assistance arrives      Needle, Disp, (BD DISP NEEDLES) 27G X 1/2\" MISC 1 Device every 30 days Use for cyanocobalamin injection once q 30 days.  Qty: 3 each, Refills: 4    Associated Diagnoses: Bariatric surgery status      ondansetron (ZOFRAN-ODT) 8 MG ODT tab DISSOLVE ONE TABLET ON TONGUE EVERY 8 HOURS AS NEEDED FOR NAUSEA  Qty: 90 tablet, Refills: 1    Associated Diagnoses: Nausea      !! order for DME Equipment being ordered: Bilateral knee high chronic venous insufficiency stockings--  mild-moderate pressures.  Qty: 4 each, Refills: 5    Associated Diagnoses: Bilateral " edema of lower extremity      !! order for DME Injection Supplies for Vitamin B12: 3cc syringes w/ 27 gauge needles, 1/2 inch length  Qty: 12 each, Refills: 0    Associated Diagnoses: Bariatric surgery status      oxyCODONE (ROXICODONE) 5 MG/5ML solution Take 10-15 mLs (10-15 mg) by mouth every 4 hours as needed for pain Max 60 mg/day. Fill 7/29/19 to start on 7/31/19.  Qty: 1800 mL, Refills: 0    Associated Diagnoses: Encounter for long-term opiate analgesic use; Chronic abdominal pain      polyethylene glycol (MIRALAX/GLYCOLAX) powder Take 17 g (1 capful) by mouth daily  Qty: 578 g, Refills: 11    Associated Diagnoses: Drug-induced constipation      sodium chloride 0.9% infusion Inject 1,000-2,000 mLs into the vein as needed       UNABLE TO FIND States takes TPN 2050 ml  Every 14 hours throught PICC      vitamin B-12 (CYANOCOBALAMIN) 1000 MCG/ML injection Inject 1 mL (1,000 mcg) into the muscle every 30 days  Qty: 1 mL, Refills: 11    Associated Diagnoses: Vitamin B12 deficiency (non anemic)      vitamin D2 (ERGOCALCIFEROL) 61542 units (1250 mcg) capsule Take 1 capsule (50,000 Units) by mouth once a week  Qty: 12 capsule, Refills: 3    Associated Diagnoses: Vitamin D deficiency       !! - Potential duplicate medications found. Please discuss with provider.        Allergies   Allergies   Allergen Reactions     Bactrim [Sulfamethoxazole W/Trimethoprim] Rash     Penicillins Anaphylaxis     Please see Antimicrobial Management Team allergy assessment note 10/10/2018. Patient reported tolerating amoxicillin.     Doxycycline Rash     Vancomycin Rash     Rash after receiving vancomycin 3/28/16 (red man's?). Tolerated with slower infusion and diphenhydramine premed.         This patient was discussed with the Care Team in the OBS Unit.  The patient's chart was reviewed and the patient was also seen and evaluated by me.  The plan of care was discussed and reviewed with the Care Team.  The above documentation reflects the  evaluation, medical decision making and plan under my supervision.    González Jaimes MD, FACEP  [facility] Staff Emergency Physician

## 2019-07-23 NOTE — PLAN OF CARE
Outpatient/Observation goals to be met before discharge home:     -diagnostic tests and consults completed and resulted -no  -vital signs normal or at patient baseline -yes  -IR consult completed with Toi placed and functioning -no  -Pt reports pain in his right arm where his PICC line is, no swelling or redness at sight, prn oxycodone given, pt frequently walks off unit with wife to go outside to smoke

## 2019-07-23 NOTE — CONSULTS
Interventional  Radiology consulted for a single lumen PICC line revision.  Chest xray shows tip of PICC line in good position,floor nurse reported the PICC aspirates and flushes well.   Parker is scheduled for a Single lumen tunneled central line placement this Thursday 7/25/2019. Pt will be seen at that time.    Discussed with Fifi [id number]    Bree Bright IR RPA  [phone] Call pager  [phone] pager

## 2019-07-23 NOTE — TELEPHONE ENCOUNTER
Patient's wife, Rose, notified that oxycodone is available.  Will put Belbuca throught PA process.  They will need specific directions from me for the transition if approved.    Jessica Milligan MD  [facility]

## 2019-07-23 NOTE — TELEPHONE ENCOUNTER
Central Prior Authorization Team   Phone: [phone]    PA Initiation    Medication: Belbuca  Insurance Company: [facility] - Phone [phone] Fax [phone]  Pharmacy Filling the Rx: [facility] - [place], [place] - [address] [place] [address]  Filling Pharmacy Phone: [phone]  Filling Pharmacy Fax:    Start Date: 7/23/2019

## 2019-07-23 NOTE — PROVIDER NOTIFICATION
Rounding on Picc line and noted that this patient was admitted with a Picc, he is scheduled for a Cm placement on the 7/25.Noticied that the stat lock was pulled off / drsg was 1/2 off and the biopatch was not there . Line was exposed for several hours . It was also out 6 cm [ chest x-ray showed that the position of the line was ok]  Vasile IVORY RN PICC nurse came to assess the line, and recommended that because it was exposed there was a risk from  line infection and that the Picc should be replaced . Dr Jaimes OPS ED  Stated too leave it for now due to Cm in 2 days . Skin was cleaned and new stat lock and dressing placed

## 2019-07-24 ENCOUNTER — MYC MEDICAL ADVICE (OUTPATIENT)
Dept: PALLIATIVE MEDICINE | Facility: CLINIC | Age: 49
End: 2019-07-24

## 2019-07-24 ENCOUNTER — PATIENT OUTREACH (OUTPATIENT)
Dept: CARE COORDINATION | Facility: CLINIC | Age: 49
End: 2019-07-24

## 2019-07-24 ENCOUNTER — TELEPHONE (OUTPATIENT)
Dept: PALLIATIVE MEDICINE | Facility: CLINIC | Age: 49
End: 2019-07-24

## 2019-07-24 ENCOUNTER — MYC MEDICAL ADVICE (OUTPATIENT)
Dept: INTERNAL MEDICINE | Facility: CLINIC | Age: 49
End: 2019-07-24

## 2019-07-24 DIAGNOSIS — Z98.84 S/P BARIATRIC SURGERY: Primary | ICD-10-CM

## 2019-07-24 DIAGNOSIS — G89.29 CHRONIC ABDOMINAL PAIN: ICD-10-CM

## 2019-07-24 DIAGNOSIS — F41.9 CHRONIC ANXIETY: ICD-10-CM

## 2019-07-24 DIAGNOSIS — R10.9 CHRONIC ABDOMINAL PAIN: ICD-10-CM

## 2019-07-24 DIAGNOSIS — R11.0 CHRONIC NAUSEA: ICD-10-CM

## 2019-07-24 ASSESSMENT — ACTIVITIES OF DAILY LIVING (ADL): DEPENDENT_IADLS:: MEDICATION MANAGEMENT;TRANSPORTATION;SHOPPING

## 2019-07-24 NOTE — LETTER
[facility]  Complex Care Plan  About Me:    Patient Name:  Parker Acevedo Sr.    YOB: 1970  Age:         48 year old   [facility] MRN:    [id number] Telephone Information:  Home Phone [phone]   Mobile [phone]       Address:  [address] [place] [address] Email address:  [email or web address]      Emergency Contact(s)    Name Relationship Lgl Grd Work Phone Home Phone Mobile Phone   1. RACHEL BERTRAND* Spouse No  [phone]   2. JEYSON CAIN * Relative No  [phone]   3. [place]* Mother No  [phone]           Primary language:  English     needed? No   [facility]:  [phone] op. 1  Other communication barriers: Glasses, Physical impairment  Preferred Method of Communication:  Chris  Current living arrangement: I live in a private home with spouse  Mobility Status/ Medical Equipment: Independent    Health Maintenance  Health Maintenance Reviewed: Due/Overdue   Health Maintenance Due   Topic Date Due     MEDICARE ANNUAL WELLNESS VISIT  06/21/2017     DTAP/TDAP/TD IMMUNIZATION (9 - Td) 01/23/2019     TOBACCO CESSATION COUNSELING  02/06/2019     CIERRA ASSESSMENT  07/30/2019     PHQ-9  07/30/2019        My Access Plan  Medical Emergency 911   Primary Clinic Line [facility] - [phone]   24 Hour Appointment Line [phone] or  [phone]) (toll-free)   24 Hour Nurse Line [phone] (toll-free)   Preferred Urgent Care Other   Preferred Hospital [facility]  [phone]   Preferred Pharmacy [facility] [place] - [address]     Behavioral Health Crisis Line The National Suicide Prevention Lifeline at 1-442.562.2326 or 911             My Care Team Members  Patient Care Team       Relationship Specialty Notifications Start End    Chuy Isaac MD PCP - General  Internal Medicine  10/30/18     Phone: [phone] Fax: [phone]         [address] [facility] [address]    Francis Vyas MD Referring Physician Surgery  4/9/15     GI     Phone: [phone] Fax: [phone]         [address] [facility] [facility] [address] [facility] [address]    Bill Brumfield MD MD Gastroenterology  6/2/15     Phone: [phone] Fax: [phone]         [address] [facility] [address]    Patti Milligan MD MD Pain Clinic  6/2/15     Phone: [phone] Fax: [phone]         [address] [facility] [address]    Behl, Melissa K, RN Lead Care Coordinator Primary Care - CC Admissions 3/28/18     Phone: [phone] Fax: [phone]        Chuy Isaac MD Assigned PCP   11/11/18     Phone: [phone] Fax: [phone]         [address] [facility] [address]    Marlyn Jenkins MD MD Ophthalmology  1/29/19     Phone: [phone] Fax: [phone]         [address] [place] [address]    Marlyn Jenkins MD MD Ophthalmology  2/11/19     Phone: [phone] Fax: [phone]         [address] [place] [address]            My Care Plans  Self Management and Treatment Plan  Goals and (Comments)  Goals        General    Medication  (pt-stated)     Notes - Note created  7/24/2019  3:01 PM by Behl, Melissa K, RN    Goal Statement: I will take and complete my antibiotic course.  Measure of Success: Antibiotics are reported as taken and completed as prescribed.  Supportive Steps to Achieve: RN CC will check with PCP on other options due to intolerance of current antibiotic.  Barriers: nausea, vomiting  Strengths: care coordination, home care  Date to Achieve By: 8/1/19  Patient expressed understanding of goal: yes                Action Plans on File:                       Advance Care Plans/Directives Type:   Type Advanced Care Plans/Directives: Advanced Directive - On File    My Medical  and Care Information  Problem List   Patient Active Problem List   Diagnosis     Peptic ulcer disease     Gastric bypass status for obesity     Chronic abdominal pain     Vomiting     Anemia     ADHD (attention deficit hyperactivity disorder), inattentive type     Vitamin B12 deficiency without anemia     Thiamine deficiency     Dehydration     Dysphagia     Weight loss, non-intentional     Malnutrition (H)     Chronic anxiety     Constipation     Bile reflux esophagitis     Former smoker     Vitamin D deficiency     Iron deficiency     Health Care Home     Chronic pain     Insomnia     Positive blood culture     Fungemia     Chronic nausea     Gastrostomy tube in place (H)     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)     Short gut syndrome     Anxiety     Status post cervical spinal arthrodesis     Port or reservoir infection, initial encounter     Abnormal echocardiogram     Low serum iron     Anemia, iron deficiency     Catheter-related bloodstream infection (CRBSI)     Generalized weakness     S/P bariatric surgery     Hyperlipidemia LDL goal <130     Bacteremia     Infection by Candida species     PICC line infiltration, sequela      Current Medications and Allergies:  See printed Medication Report.    Care Coordination Start Date: 1/10/2019   Frequency of Care Coordination: weekly   Form Last Updated: 07/24/2019

## 2019-07-24 NOTE — PROGRESS NOTES
This is a recent snapshot of the patient's [facility] medical record.  For current drug dose and complete information and questions, call [phone] or In Basket pool, [facility] home infusion ([id number])  CSN Number:  [id number]

## 2019-07-24 NOTE — TELEPHONE ENCOUNTER
Co-pay for the Belbuca is $131.80/30 day supply. A co-pay card is not allowed with his insurance, but the pharmacist did report to me that he has been paying a co-pay of  $168.00/30 day supply every month for the oxycodone,  so the Belbuca is actually quite a bit cheaper.    Jyothi Winchester, YADIRAN, RN  Care Coordinator  [facility] [place]

## 2019-07-24 NOTE — TELEPHONE ENCOUNTER
Central Prior Authorization Team   Phone: [phone]    Prior Authorization Approval    Authorization Effective Date: 4/24/2019  Authorization Expiration Date: 7/23/2020  Medication: Belbuca  Approved Dose/Quantity:   Reference #:     Insurance Company: [employer] [place] - Phone [phone] Fax [phone]  Expected CoPay:       CoPay Card Available:      Foundation Assistance Needed:    Which Pharmacy is filling the prescription (Not needed for infusion/clinic administered): [facility] [place] [address] [place] [address]  Pharmacy Notified: Yes  Patient Notified: Yes  **Instructed pharmacy to notify patient when script is ready to /ship.**  Will attach approval letter from insurance when received.

## 2019-07-24 NOTE — TELEPHONE ENCOUNTER
RN CC spoke with spouse for hospital follow up.  Spouse states patient tried emptying the contents of the cephalexin capsule into something for patient and patient has been experiencing nausea, bloating needing to empty is G-tube and vomit X1.  Last dose of cephalexin was this morning.      Patient and spouse are inquiring about a different antibiotic or any other recommendations from PCP.    Please advise.    Melissa Behl BSN, RN, PHN  Primary Care Clinical RN Care Coordinator  [facility]   [phone]

## 2019-07-24 NOTE — TELEPHONE ENCOUNTER
Could we please find out copay from pharmacy  Another mychart states that it isn't approved and too expensive.  I want to make sure this is feasible.    Jessica Milligan MD  [facility]

## 2019-07-24 NOTE — PROGRESS NOTES
Clinic Care Coordination Contact    Clinic Care Coordination Contact  OUTREACH    Referral Information:  Referral Source: IP Report    Primary Diagnosis: SIRS/Sepsis    Chief Complaint   Patient presents with     Clinic Care Coordination - Post Hospital     RN        Universal Utilization: Patient is followed Infectious Disease, Pain Management and Bariatric Surgery  Clinic Utilization  Difficulty keeping appointments:: No  Compliance Concerns: Yes  No-Show Concerns: No  No PCP office visit in Past Year: No  Utilization    Last refreshed: 7/24/2019  8:25 AM:  Hospital Admissions 4           Last refreshed: 7/24/2019  8:25 AM:  ED Visits 5           Last refreshed: 7/24/2019  8:25 AM:  No Show Count (past year) 14              Current as of: 7/24/2019  8:25 AM              Clinical Concerns:  Current Medical Concerns:  RN CLARITZA spoke to patient and spouse (consent to communicate on file) following his hospitalization 7/22/19-7/23/19 for PICC line malfunction.  Patient was discharged home with PICC line in place and a peripheral IV line.  Patient's spouse states they instructed by [facility] not to use the line.  Patient is scheduled for a Cm line placement tomorrow.  Spouse will confirm a ride is set up for this procedure.  Patient has been having nausea following his cephalexin antibiotic doses as well as needing to drain his G-tube and vomiting X1.  Temperature today is 99.6.  Patient and spouse sent a [employer] message to PCP inquiring if patient could have a different antibiotic.  Patient clarified for writer that he was emptying the contents of the capsule into a liquid so it was easier to swallow, but he continued to have these symptoms.  RN CC sent an update to PCP in 7/24/19 MyChart encounter.  Patient and spouse verbalized concern over new medication Belbuca.  Patient and spouse state their insurance will not cover it.  RN CC reviewed chart and noted in 7/24/19 telephone encounter that patient's  prior authorization for Belbuca was approved.  Patient and spouse continued to verbalize concern over changing from oxycodone to Belbuca.  RN CC advised for patient and spouse to contact pain clinic to discuss concerns.    Patient Active Problem List   Diagnosis     Peptic ulcer disease     Gastric bypass status for obesity     Chronic abdominal pain     Vomiting     Anemia     ADHD (attention deficit hyperactivity disorder), inattentive type     Vitamin B12 deficiency without anemia     Thiamine deficiency     Dehydration     Dysphagia     Weight loss, non-intentional     Malnutrition (H)     Chronic anxiety     Constipation     Bile reflux esophagitis     Former smoker     Vitamin D deficiency     Iron deficiency     Health Care Home     Chronic pain     Insomnia     Positive blood culture     Fungemia     Chronic nausea     Gastrostomy tube in place (H)     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)     Short gut syndrome     Anxiety     Status post cervical spinal arthrodesis     Port or reservoir infection, initial encounter     Abnormal echocardiogram     Low serum iron     Anemia, iron deficiency     Catheter-related bloodstream infection (CRBSI)     Generalized weakness     S/P bariatric surgery     Hyperlipidemia LDL goal <130     Bacteremia     Infection by Candida species     PICC line infiltration, sequela          Current Behavioral Concerns: No concerns at this time.      Education Provided to patient: RN CC reviewed hospital discharge instructions, reviewed pain clinic notes of approved prior authorization for Belbuca and MyChart recommendations by PCP.     Pain  Pain (GOAL):: Yes  Type: Chronic (>3mo)  Location of chronic pain:: chronic abdominal pain and lower back pain  Radiating: No  Progression: Waxing and Waning  Description of pain: Aching  Chronic pain severity:: 4  Limitation of routine activities due to chronic pain:: Yes  Description: Unable to perform most daily activities (chores,  hobbies, social activities, driving)  Alleviating Factors: Pain Medication, Rest, Heat  Aggravating Factors: Eating, Positioning, Activity  Health Maintenance Reviewed: Due/Overdue   Health Maintenance Due   Topic Date Due     MEDICARE ANNUAL WELLNESS VISIT  06/21/2017     DTAP/TDAP/TD IMMUNIZATION (9 - Td) 01/23/2019     TOBACCO CESSATION COUNSELING  02/06/2019     CIERRA ASSESSMENT  07/30/2019     PHQ-9  07/30/2019      Clinical Pathway: None    Medication Management:  Spouse assists patient with medication management, TPN and IV hydration.  See above in medical concerns regarding cephalexin side effects per patient report and concern from patient and spouse regarding Belbuca.  MTM referral placed due to patient and spouse concerns and for care transitions.    Functional Status:  Dependent ADLs:: Ambulation-cane, Bathing  Dependent IADLs:: Medication Management, Transportation, Shopping  Bed or wheelchair confined:: No  Mobility Status: Independent  Fallen 2 or more times in the past year?: No  Any fall with injury in the past year?: No    Living Situation:  Current living arrangement:: I live in a private home with spouse  Type of residence:: [facility]    Diet/Exercise/Sleep:  Diet:: Regular(But also has TPN)  Inadequate nutrition (GOAL):: No  Food Insecurity: No  Tube Feeding: No  Exercise:: Currently not exercising  Inadequate activity/exercise (GOAL):: No  Significant changes in sleep pattern (GOAL): No    Transportation:  Transportation concerns (GOAL):: No  Transportation means:: Regular car     Psychosocial:  Orthodox or spiritual beliefs that impact treatment:: No  Mental health DX:: Yes  Mental health DX how managed:: Medication  Mental health management concern (GOAL):: No  Informal Support system:: Family, Spouse     Financial/Insurance:   Financial/Insurance concerns (GOAL):: No  Spouse not currently working, is a PCA and her client moved to long term care.  Spouse awaiting a new client.      Resources and Interventions:  Current Resources:   List of home care services:: Skilled Nursing;   Community Resources: Home Infusion, Home Care  Supplies used at home:: None  Equipment Currently Used at Home: cane, straight, shower chair    Advance Care Plan/Directive  Advanced Care Plans/Directives on file:: Yes  Type Advanced Care Plans/Directives: Advanced Directive - On File  Advanced Care Plan/Directive Status: Not Applicable    Referrals Placed: None     Goals:   Goals        General    Medication  (pt-stated)     Notes - Note created  7/24/2019  3:01 PM by Behl, Melissa K, RN    Goal Statement: I will take and complete my antibiotic course.  Measure of Success: Antibiotics are reported as taken and completed as prescribed.  Supportive Steps to Achieve: RN CC will check with PCP on other options due to intolerance of current antibiotic.  Barriers: nausea, vomiting  Strengths: care coordination, home care  Date to Achieve By: 8/1/19  Patient expressed understanding of goal: yes                 Patient/Caregiver understanding: Patient and spouse have concerns regarding current plan of care and will follow up with pain specialist and PCP as advised.    Outreach Frequency: weekly  Future Appointments              Tomorrow U2A ROOM 3 Unit 2A [facility] O    [facility]    In 1 month Patti Milligan MD [facility] [place], [facility]          Plan:   1. Spouse will ensure transportation is in place for patient's Cm placement tomorrow.  2. Patient and spouse will contact pain clinic to discuss concerns with Belbuca.  3. RN CC sent update on symptomos following cephalexin doses to PCP in 7/24/19 MyChart encounter.  4. RN CC will follow up with patient in 1-2 weeks.    Melissa Behl BSN, RN, PHN  Primary Care Clinical RN Care Coordinator  [facility]   [phone]

## 2019-07-24 NOTE — TELEPHONE ENCOUNTER
Will forward to Dr. Milligan as FYI.    Skinny Kim, RN  Care Coordinator   [facility]   \

## 2019-07-24 NOTE — TELEPHONE ENCOUNTER
Parker (and Rose),    The Belbuca was approved today, and the monthly cost is actually going to be less than what you are paying for the oxycodone liquid.  The belbuca is $131.80/30 day supply vs what you have been paying for the oxycodone-  $168.00/30 day supply.    So the plan will be that you will use the 15 day supply of oxycodone.  It is best if you have at least 12 hours after your last oxycodone dose before starting the Belbuca.  I am putting down a start date of the Belbuca as 8/7, and will plan to release it from the pharmacy after 8/6.    You can read more about it on [email or web address]    Jessica Milligan MD  [facility]

## 2019-07-24 NOTE — TELEPHONE ENCOUNTER
Wife calling to inform that pt is out of the Hospital and is home. Medication was picked up at 5pm.      Wade MIGUEL    [facility] [place]

## 2019-07-24 NOTE — TELEPHONE ENCOUNTER
Fany from [facility] pharmacy calling to inform Srini that the Belbuca Medication PA was approved.      Wade MIGUEL    [facility] [place]

## 2019-07-24 NOTE — TELEPHONE ENCOUNTER
Please call pharmacy  Ask them to plan for a release date of 8/6 and a start date of 8/7 for the Belbuca.  I do not want to provide more time than that, as I want to be firm about his current oxycodone lasting him 15 days, and not allowing him Belbuca as he may overuse and be tempted to start it early.    Sent a message on [employer] encounter started by them  Parker (and Rose),    The Belbuca was approved today, and the monthly cost is actually going to be less than what you are paying for the oxycodone liquid.  The belbuca is $131.80/30 day supply vs what you have been paying for the oxycodone-  $168.00/30 day supply.    So the plan will be that you will use the 15 day supply of oxycodone.  It is best if you have at least 12 hours after your last oxycodone dose before starting the Belbuca.  I am putting down a start date of the Belbuca as 8/7, and will plan to release it from the pharmacy after 8/6.    You can read more about it on [email or web address]    Jessica Milligan MD  [facility]

## 2019-07-25 ENCOUNTER — TELEPHONE (OUTPATIENT)
Dept: PALLIATIVE MEDICINE | Facility: CLINIC | Age: 49
End: 2019-07-25

## 2019-07-25 ENCOUNTER — MYC MEDICAL ADVICE (OUTPATIENT)
Dept: PALLIATIVE MEDICINE | Facility: CLINIC | Age: 49
End: 2019-07-25

## 2019-07-25 ENCOUNTER — ALLIED HEALTH/NURSE VISIT (OUTPATIENT)
Dept: PHARMACY | Facility: CLINIC | Age: 49
End: 2019-07-25
Payer: COMMERCIAL

## 2019-07-25 ENCOUNTER — HOME INFUSION (PRE-WILLOW HOME INFUSION) (OUTPATIENT)
Dept: PHARMACY | Facility: CLINIC | Age: 49
End: 2019-07-25

## 2019-07-25 DIAGNOSIS — G89.29 CHRONIC ABDOMINAL PAIN: ICD-10-CM

## 2019-07-25 DIAGNOSIS — Z98.84 S/P BARIATRIC SURGERY: Primary | ICD-10-CM

## 2019-07-25 DIAGNOSIS — F41.9 CHRONIC ANXIETY: ICD-10-CM

## 2019-07-25 DIAGNOSIS — R11.0 CHRONIC NAUSEA: ICD-10-CM

## 2019-07-25 DIAGNOSIS — E63.9: ICD-10-CM

## 2019-07-25 DIAGNOSIS — R10.9 CHRONIC ABDOMINAL PAIN: ICD-10-CM

## 2019-07-25 DIAGNOSIS — I43: ICD-10-CM

## 2019-07-25 DIAGNOSIS — E46 MALNUTRITION, UNSPECIFIED TYPE (H): ICD-10-CM

## 2019-07-25 DIAGNOSIS — F90.9 ATTENTION DEFICIT HYPERACTIVITY DISORDER (ADHD), UNSPECIFIED ADHD TYPE: ICD-10-CM

## 2019-07-25 PROCEDURE — 99207 ZZC NO CHARGE LOS: CPT | Performed by: PHARMACIST

## 2019-07-25 RX ORDER — ONDANSETRON 8 MG/1
TABLET, ORALLY DISINTEGRATING ORAL
Qty: 90 TABLET | Refills: 1 | Status: SHIPPED | OUTPATIENT
Start: 2019-07-25 | End: 2019-09-26

## 2019-07-25 NOTE — PROGRESS NOTES
Clinic Care Coordination Contact  Care Team Conversations    The RN CC was able to contact the wife of the patient to inform them to stop taking the cephalexin per orders from Dr. Isaac.  She stated understanding and had no further questions.      Sesar Novak MSN, RN, PHN, CCM   Primary Care Clinical RN Care Coordinator  [facility]   [email or web address]  Office: [phone]

## 2019-07-25 NOTE — LETTER
"     [facility]     Date: 2019    Parker Acevedo Sr.  [address] [facility] [address]    Dear Mr. Acevedo,    Thank you for talking with me on 19 about your health and medications. Medicare s MTM (Medication Therapy Management) program helps you understand your medications and use them safely.      This letter includes an action plan (Medication Action Plan) and medication list (Personal Medication List). The action plan has steps you should take to help you get the best results from your medications. The medication list will help you keep track of your medications and how to use them the right way.       Have your action plan and medication list with you when you talk with your doctors, pharmacists, and other healthcare providers in your care team.     Ask your doctors, pharmacists, and other healthcare providers to update the action plan and medication list at every visit.     Take your medication list with you if you go to the hospital or emergency room.     Give a copy of the action plan and medication list to your family or caregivers.     If you want to talk about this letter or any of the papers with it, please call   [phone].   We look forward to working with you, your doctors, and other healthcare providers to help you stay healthy.     Sincerely,    Raul Acevedo      Enclosed: Medication Action Plan and Personal Medication List    MEDICATION ACTION PLAN FOR Parker Acevedo Sr.,  1970     This action plan will help you get the best results from your medications if you:   1. Read \"What we talked about.\"   2. Take the steps listed in the \"What I need to do\" boxes.   3. Fill in \"What I did and when I did it.\"   4. Fill in \"My follow-up plan\" and \"Questions I want to ask.\"     Have this action plan with you when you talk with your doctors, pharmacists, and other healthcare providers in your care team. Share this with your family or " caregivers too.  DATE PREPARED: 2019  What we talked about: What my medicines are for, how to know if my medicines are working, made sure my medicines are safe for me and reviewed how to take my medicines.                                                   What I need to do: Take my medicines every day What I did and when I did it:                                              My follow-up plan:                 Questions I want to ask:              If you have any questions about your action plan, call Raul Berger Curtis, Phone: [phone] , Monday-Friday 8-4:30pm.           MEDICATION LIST FOR Parker Acevedo .,  1970     This medication list was made for you after we talked. We also used information from your doctor's chart.      Use blank rows to add new medications. Then fill in the dates you started using them.    Cross out medications when you no longer use them. Then write the date and why you stopped using them.    Ask your doctors, pharmacists, and other healthcare providers to update this list at every visit. Keep this list up-to-date with:       Prescription medications    Over the counter drugs     Herbals    Vitamins    Minerals      If you go to the hospital or emergency room, take this list with you. Share this with your family or caregivers too.     DATE PREPARED: 2019  Allergies or side effects: Bactrim [sulfamethoxazole w/trimethoprim]; Penicillins; Doxycycline; and Vancomycin     Medication:  ACETAMINOPHEN 160 MG/5ML ORAL LIQUID      How I use it:  Take 500 mg by mouth every 4 hours as needed for fever or mild pain      Why I use it:  Headache, Pain    Prescriber:  Patient Reported      Date I started using it:       Date I stopped using it:         Why I stopped using it:            Medication:  ALBUTEROL SULFATE  (90 BASE) MCG/ACT IN AERS      How I use it:  Inhale 2 puffs into the lungs every 4 hours as needed for shortness of breath / dyspnea or wheezing       Why I use it: Productive cough    Prescriber:  Esteban Daly MD      Date I started using it:       Date I stopped using it:         Why I stopped using it:            Medication:  AMPHETAMINE-DEXTROAMPHETAMINE 20 MG PO TABS      How I use it:  Take 1 tablet (20 mg) by mouth daily      Why I use it: ADHD    Prescriber:  Chuy Isaac MD      Date I started using it:       Date I stopped using it:         Why I stopped using it:            Medication:  BUPRENORPHINE  MCG BU FILM      How I use it:  Place 1 Film (300 mcg) inside cheek every 12 hours      Why I use it: Chronic abdominal pain; Severe malnutrition (H)    Prescriber:  Patti Milligan MD      Date I started using it:       Date I stopped using it:         Why I stopped using it:            Medication:  CARAFATE 1 GM/10ML PO SUSP      How I use it:   Take 10 mL (1 gram) by mouth three to four times daily      Why I use it:  Digestive protection    Prescriber:  Patient Reported      Date I started using it:       Date I stopped using it:         Why I stopped using it:            Medication:  CARVEDILOL 6.25 MG PO TABS      How I use it:  Take 6.25 mg by mouth 2 times daily (with meals)      Why I use it:  Blood pressure    Prescriber:  Entered By History Unknown      Date I started using it:       Date I stopped using it:         Why I stopped using it:            Medication:  CYANOCOBALAMIN 1000 MCG/ML IJ SOLN      How I use it:  Inject 1 mL (1,000 mcg) into the muscle every 30 days      Why I use it: Vitamin B12 deficiency     Prescriber:  Chuy Isaac MD      Date I started using it:       Date I stopped using it:         Why I stopped using it:            Medication:  DIPHENHYDRAMINE HCL 12.5 MG/5ML PO ELIX      How I use it:  Take 10 mLs (25 mg) by mouth 3 times daily as needed for itching (Prior to vanco administration)      Why I use it: Prevent side effects    Prescriber:  Flora Rodriguez MD      Date I started using it:        Date I stopped using it:         Why I stopped using it:            Medication:  FIRST-MOUTHWASH BLM MT SUSP      How I use it:  Swish and swallow 10 mLs in mouth every 6 hours as needed for mouth sores      Why I use it: Mouth sore    Prescriber:  Flora Rodriguez MD      Date I started using it:       Date I stopped using it:         Why I stopped using it:            Medication:  FVR TH LORAZEPAM (3PK) 1 MG TAB      How I use it:  Take 1-2 mg by mouth as needed for anxiety with TPN and medications      Why I use it: Anxiety    Prescriber:  Chuy Isaac MD      Date I started using it:       Date I stopped using it:         Why I stopped using it:            Medication:  LIDOCAINE 5 % EX PTCH      How I use it:  Place 1-2 patches onto the skin every 24 hours Wear for 12 hours, remove for 12 hours.  OK to cut to better fit to size.      Why I use it: Chronic bilateral low back pain    Prescriber:  Patti Milligan MD      Date I started using it:       Date I stopped using it:         Why I stopped using it:            Medication:  NALOXONE HCL 4 MG/0.1ML NA LIQD      How I use it:  Spray 4 mg into one nostril alternating nostrils once as needed every 2-3 minutes until assistance arrives      Why I use it:  Opioid reversal    Prescriber:  Patient Reported      Date I started using it:       Date I stopped using it:         Why I stopped using it:            Medication:  ONDANSETRON 8 MG PO TBDP      How I use it:  DISSOLVE ONE TABLET ON TONGUE EVERY 8 HOURS AS NEEDED FOR NAUSEA      Why I use it: Nausea    Prescriber:  Chuy Isaac MD      Date I started using it:       Date I stopped using it:         Why I stopped using it:            Medication:  OXYCODONE HCL 5 MG/5ML PO SOLN      How I use it:  Take 10-15 mLs (10-15 mg) by mouth every 4 hours as needed for pain Max 60 mg/day.       Why I use it:  Chronic abdominal pain    Prescriber:  Patti Milligan MD      Date I started using it:        Date I stopped using it:         Why I stopped using it:            Medication:  POLYETHYLENE GLYCOL 3350 PO POWD      How I use it:  Take 17 g (1 capful) by mouth daily as needed      Why I use it: Drug-induced constipation    Prescriber:  Patti Milligan MD      Date I started using it:       Date I stopped using it:         Why I stopped using it:            Medication:  SODIUM CHLORIDE 0.9 % IV SOLN      How I use it:  Inject 1,000-2,000 mLs into the vein as needed       Why I use it:  Hydration    Prescriber:  Entered By History Unknown      Date I started using it:       Date I stopped using it:         Why I stopped using it:            Medication:  VITAMIN D2 (ERGOCALCIFEROL) 83937 UNIT PO CAPS      How I use it:  Take 1 capsule (50,000 Units) by mouth once a week      Why I use it: Vitamin D deficiency    Prescriber:  Chuy Isaac MD      Date I started using it:       Date I stopped using it:         Why I stopped using it:            Medication:         How I use it:         Why I use it:      Prescriber:         Date I started using it:       Date I stopped using it:         Why I stopped using it:            Medication:         How I use it:         Why I use it:      Prescriber:         Date I started using it:       Date I stopped using it:         Why I stopped using it:            Medication:         How I use it:         Why I use it:      Prescriber:         Date I started using it:       Date I stopped using it:         Why I stopped using it:              Other Information:     If you have any questions about your action plan, call [phone].    According to the Paperwork Reduction Act of 1995, no persons are required to respond to a collection of information unless it displays a valid OMB control number. The valid B number for this information collection is 9220-0561. The time required to complete this information collection is estimated to average 40 minutes per response,  including the time to review instructions, searching existing data resources, gather the data needed, and complete and review the information collection. If you have any comments concerning the accuracy of the time estimate(s) or suggestions for improving this form, please write to: CMS, Attn: SHAINA Reports Clearance Officer, [address] [place] [address], [place], [place] [address].

## 2019-07-25 NOTE — TELEPHONE ENCOUNTER
"Requested Prescriptions   Pending Prescriptions Disp Refills     ondansetron (ZOFRAN-ODT) 8 MG ODT tab [Pharmacy Med Name: ONDANSETRON 8MG TBDP] 90 tablet 1     Sig: DISSOLVE ONE TABLET ON TONGUE EVERY 8 HOURS AS NEEDED FOR NAUSEA   Last Written Prescription Date:  2/8/19  Last Fill Quantity: 90,  # refills: 1   Last office visit: 10/5/2018 with prescribing provider:  7/10/19   Future Office Visit:   Next 5 appointments (look out 90 days)    Sep 11, 2019  1:45 PM CDT  Return Visit with Patti Milligan MD  [facility] ([facility]) [address] [address]  [phone]              Antivertigo/Antiemetic Agents Passed - 7/23/2019  5:23 PM        Passed - Recent (12 mo) or future (30 days) visit within the authorizing provider's specialty     Patient had office visit in the last 12 months or has a visit in the next 30 days with authorizing provider or within the authorizing provider's specialty.  See \"Patient Info\" tab in inbasket, or \"Choose Columns\" in Meds & Orders section of the refill encounter.              Passed - Medication is active on med list        Passed - Patient is 18 years of age or older        Patient has been seen in the past 30 days.  Routing to PCP to advise.    YADIRA WallsN, RN    "

## 2019-07-25 NOTE — TELEPHONE ENCOUNTER
Spoke to pharmacist and asked her to add release date of 8/6 and a start date of 8/7 for the Belbuca.    YADIRA LazarN, RN  Care Coordinator  [facility] [place]

## 2019-07-25 NOTE — PROGRESS NOTES
Clinic Care Coordination Contact  Care Team Conversations    Unable to contact the patient by phone as the line was always busy.  Therefore a [employer] message was attached as a reply to the patient from the mychart encounter containing the directions from Dr. Isaac.  The instructions from Dr. Isaac are to discontinue the cephalexin in the light of the patient not having any cultures done and may be it was not needed.  This will be forwarded to Melissa Behl the care coordinator.        Sesar Novak MSN, RN, PHN, [facility]   Primary Care Clinical RN Care Coordinator  [facility]   [email or web address]  Office: [phone]

## 2019-07-25 NOTE — TELEPHONE ENCOUNTER
[employer] message was sent to the patient with instructions from Dr. Isaac.      Sesar Novak MSN, RN, PHN, [facility]   Primary Care Clinical RN Care Coordinator  [facility]   [email or web address]  Office: [phone]

## 2019-07-25 NOTE — PROGRESS NOTES
SUBJECTIVE/OBJECTIVE:                Parker Acevedo . is a 48 year old male called for a transitions of care visit.  He was discharged from [facility] on 7/23/19 for PICC line malfunction. Visit completed today with patient's wife, Rose, with consent from patient    Chief Complaint: Hospital Follow-up    Allergies/ADRs: Reviewed in Epic  Tobacco: 0-1 pack per day - is not interested in quitting   Alcohol: none  Caffeine: no caffeine  Activity: back to baseline  PMH: Reviewed in Epic    Medication Adherence/Access:  Patient takes medications directly from bottles and uses pill box(es).  Patient takes medications 4 time(s) per day.   Per patient, misses medication 0 times per week.   Medication barriers: difficulty with administering medications. Pt does have some concerns about his ability to take Belbuca (concerns about aspiration/nausea & vomiting).   The patient fills medications at [facility]: YES.    S/p gastric bypass/malnutrition: Pt is receiving TPN every evening for 14 hours (does take lorazepam every night with TPN medication). He also receives a monthly vitamin B12 injection and vitamin D2 50,000 units every Sunday.  Pt is supposed to be taking Carafate, but they are having difficulty getting this covered in the liquid formulation. Hospitalization was due to concern for PICC line placement.  Pt was started on some prophylactic antibiotic (cephalexin), but had GI side effect and was instructed to stop by PCP yesterday. Has Zofran ODT available PRN for nausea, but takes scheduled 3 times daily (working though insurance coverage issues for quantity limits). Has not used Magic Mouthwash for mouth sores.     Chronic Pain: Pt is taking oxycodone solution 10-15 mg every 4-6 hours (3-4 times daily) for pain and acetaminophen liquid PRN (primarily for headaches). Depending on location of pain may use lidocaine 5% patches PRN (a few times a week at least). Wife states that pain has been an ongoing struggle to  "control.  States they were told to go to the ER if medication does not work for pain, but they are \"always\" told to take a higher dose when there.  Pt's pain provider wants patient to change to Belbuca buccal film, but patient/wife have some concerns related to scenarios in which patient would have nausea/vomit or potentially aspirate.  Wife is also concerned with the fact that patient is not producing much saliva on his ability to absorb this medication. Does have Miralax available PRN for when he is having hard stools (which is very rare).  They do have Narcan available, but have not needed.    Cardiomyopathy: Current medications include carvedilol 6.25 mg twice daily.  Patient does self-monitor BP. Home BP monitoring in range of 100-120's systolic over 70-80's diastolic. States patient was anxious after talking to his pain provider before last BP check. Patient reports no current medication side effects.    ADHD: Pt is taking Adderall 20 mg daily. Pt wife states that this is effective. Denies any known issues.     Today's Vitals: There were no vitals taken for this visit. - telephone encounter, no vitals    ASSESSMENT:                 Current medications were reviewed today.      Medication Adherence: good, no issues identified    S/p gastric bypass/malnutrition/anxiety/nausea: Improving per patient/wife.    Chronic Pain: At baseline per wife - waiting for change to Belbuca. Reviewed instructions/testimonials regarding Belbuca use - not clear that there is a high aspiration risk or risk of loss due to nausea given relatively quick dissolving film.     Cardiomyopathy: Improved per patient's home readings. Would benefit from recheck at upcoming appointment.     ADHD: Stable per patient/wife.     PLAN:                  Post Discharge Medication Reconciliation Status: discharge medications reconciled, continue medications without change.    1. Continue current medications/plans.     I spent 30 minutes with this " patient today. A copy of the visit note was provided to the patient's primary care provider.    Will follow up in 2-4 weeks if needed.    The patient was sent via [employer] a summary of these recommendations as an after visit summary.    Bronson Acevedo, WaiD, [facility]  Medication Therapy Management Pharmacist  Pager: [phone]

## 2019-07-25 NOTE — TELEPHONE ENCOUNTER
Pt left msg on vm stating that the new medication he was put he would rather go back to the Fentanyl Patches instead. Please call to advise.      Wade MIGEUL    [facility] [place]

## 2019-07-26 ENCOUNTER — MYC MEDICAL ADVICE (OUTPATIENT)
Dept: PALLIATIVE MEDICINE | Facility: CLINIC | Age: 49
End: 2019-07-26

## 2019-07-26 ENCOUNTER — TELEPHONE (OUTPATIENT)
Dept: PALLIATIVE MEDICINE | Facility: CLINIC | Age: 49
End: 2019-07-26

## 2019-07-26 NOTE — TELEPHONE ENCOUNTER
Belbuca is an opioid film that works by sticking to the inside of your cheek and dissolving completely-typically within 30 minutes.  You can fill your Belbuca on 8/6/19 but do not start it until 8/7/19. Do not use the oxycodone while on Belbuca. There are 3 steps you need to follow to use Belbuca: peel, place, and press.    Peel the foil to open the Belbuca packaging  Place the film on your fingers  Press the yellow side of buccal film against the inside of cheek    1.Make sure your fingers are clean and dry. Tear open the foil package by folding on the dotted line and tearing down at the perforation, or by carefully cutting the package with scissors.  2.Remove the buccal film from the package.  3.Wet the inside of your cheek with your tongue or with water.  4.Place the film on a dry fingertip with the yellow side facing up.  5.Press the yellow side against the inside of your cheek and hold in place for 5 seconds.  6.Leave Belbuca on the inside of your cheek until fully dissolved, usually within 30 minutes    Tips for applying BELBUCA     Apply at the same time each day, making sure to follow your healthcare provider's instructions   Never use Belbuca if the pouch seal is broken or the film is damaged in any way   You may need to use 2 fingers to hold the film securely before placing it in your mouth   It helps to press another finger on the outside of your cheek to position the film correctly   Do not apply the film to any area of your mouth with open sores or lesions   Do not place the film too high or too far back on the inside of your cheek   Do not chew or swallow the film; this may decrease the film's effectiveness and could cause choking, overdose, or death   It is normal for the texture of the film to change as it dissolves   Do not eat or drink anything until the film has completely dissolved, usually within 30 minutes    Please let us know if there are further questions. I will mail you some information  and you can also find the information at [email or web address]    Thank you,  ERIN Lazar, RN  Care Coordinator  [facility]  ----------------------------  [employer] brochure mailed to patient. After this was sent to patient I saw the message pasted below. Please review.  ----------------------------  Pt left msg on vm stating that the new medication he was put he would rather go back to the Fentanyl Patches instead. Please call to advise.        Wade MIGUEL    [facility]  -----------------------------------  ERIN Lazar, RN  Care Coordinator  [facility] [place]

## 2019-07-26 NOTE — TELEPHONE ENCOUNTER
This is being managed in a separate encounter.    YADIRA LazarN, RN  Care Coordinator  [facility] [place]

## 2019-07-26 NOTE — PROGRESS NOTES
This is a recent snapshot of the patient's [facility] medical record.  For current drug dose and complete information and questions, call [phone] or In Basket pool, fv home infusion ([id number])  CSN Number:  [id number]

## 2019-07-26 NOTE — TELEPHONE ENCOUNTER
Thanks for the note.    Please understand I know this is not what you have used before, but because of the overuse of medications, oxycodone and fentanyl options are not a the table right now.  We are making this change for your safety, while still treating your pain.    Jessica Milligan MD  [facility]

## 2019-07-26 NOTE — TELEPHONE ENCOUNTER
PA approved.  Effective date: 04/24/19  PA reference #: 07/23/2020  Pt. notified:   Letter sent by insurance company and letter forwarded to Abstracting Buprenorphine HCl (BELBUCA) 300 MCG FILM buccal film.

## 2019-07-29 NOTE — TELEPHONE ENCOUNTER
[employer] message from patient on 7/26 at 1217:    Parker would like you to call him because he is wondering if he could do the fentanyl patch instead of the other one he left a message for you on the phone yesterday not for sure if you received it or not thank you so much   ------------  Esther,     As Jessica Milligan MD explained via a [employer] message on Friday morning 7/26, she knows that you have not used this type of medication before but, because of the overuse of medications, oxycodone and fentanyl options are not a the table right now.  As she also said, she is making this change for your safety, while still treating your pain.     Thank you,     YADIRA CrookN, RN-BC  Patient Care Supervisor/Care Coordinator

## 2019-07-30 ENCOUNTER — HOME INFUSION (PRE-WILLOW HOME INFUSION) (OUTPATIENT)
Dept: PHARMACY | Facility: CLINIC | Age: 49
End: 2019-07-30

## 2019-07-31 NOTE — PROGRESS NOTES
This is a recent snapshot of the patient's [facility] medical record.  For current drug dose and complete information and questions, call [phone] or In [facility] pool, fv home infusion ([id number])  CSN Number:  [id number]

## 2019-08-01 ENCOUNTER — MYC REFILL (OUTPATIENT)
Dept: FAMILY MEDICINE | Facility: CLINIC | Age: 49
End: 2019-08-01

## 2019-08-01 ENCOUNTER — MYC MEDICAL ADVICE (OUTPATIENT)
Dept: PALLIATIVE MEDICINE | Facility: CLINIC | Age: 49
End: 2019-08-01

## 2019-08-01 ENCOUNTER — MYC MEDICAL ADVICE (OUTPATIENT)
Dept: INTERNAL MEDICINE | Facility: CLINIC | Age: 49
End: 2019-08-01

## 2019-08-01 DIAGNOSIS — G89.29 CHRONIC ABDOMINAL PAIN: ICD-10-CM

## 2019-08-01 DIAGNOSIS — Z79.891 ENCOUNTER FOR LONG-TERM OPIATE ANALGESIC USE: ICD-10-CM

## 2019-08-01 DIAGNOSIS — E53.8 VITAMIN B12 DEFICIENCY (NON ANEMIC): ICD-10-CM

## 2019-08-01 DIAGNOSIS — F41.9 ANXIETY: ICD-10-CM

## 2019-08-01 DIAGNOSIS — E55.9 VITAMIN D DEFICIENCY: ICD-10-CM

## 2019-08-01 DIAGNOSIS — R10.9 CHRONIC ABDOMINAL PAIN: ICD-10-CM

## 2019-08-01 DIAGNOSIS — F90.0 ADHD (ATTENTION DEFICIT HYPERACTIVITY DISORDER), INATTENTIVE TYPE: ICD-10-CM

## 2019-08-02 ENCOUNTER — HOME INFUSION (PRE-WILLOW HOME INFUSION) (OUTPATIENT)
Dept: PHARMACY | Facility: CLINIC | Age: 49
End: 2019-08-02

## 2019-08-02 ENCOUNTER — TELEPHONE (OUTPATIENT)
Dept: INTERNAL MEDICINE | Facility: CLINIC | Age: 49
End: 2019-08-02

## 2019-08-02 DIAGNOSIS — T82.9XXA COMPLICATION ASSOCIATED WITH PERIPHERALLY INSERTED CENTRAL CATHETER, INITIAL ENCOUNTER: Primary | ICD-10-CM

## 2019-08-02 RX ORDER — OXYCODONE HCL 5 MG/5 ML
10-15 SOLUTION, ORAL ORAL EVERY 4 HOURS PRN
Qty: 360 ML | Refills: 0 | Status: SHIPPED | OUTPATIENT
Start: 2019-08-02 | End: 2019-08-21

## 2019-08-02 NOTE — TELEPHONE ENCOUNTER
Spoke to Pharmacist at [facility] [place] Phone: [phone] Fax: and relayed the information below. Placed med note for change in Belbuca fill and start date.    oxyCODONE (ROXICODONE) 5 MG/5ML solution Fill 8/6 to start on 8/7/19. 6 day supply    Buprenorphine HCl (BELBUCA) 300 MCG FILM buccal film fill 8/12 to start on 8/13. 30 day supply.    YADIRA LazarN, RN  Care Coordinator  [facility]

## 2019-08-02 NOTE — TELEPHONE ENCOUNTER
Please try to contact his homecare nurse and see the plan is active with the U.  Won't order that out till full plan is known.

## 2019-08-02 NOTE — TELEPHONE ENCOUNTER
Reason for Call:  Other     Detailed comments: [facility] states they will be pulling Parker's PICC line today and putting in a peripheral IV until a Cm is placed next week.    Phone Number Patient can be reached at: [phone]    Best Time: any    Can we leave a detailed message on this number? YES    Call taken on 8/2/2019 at 12:25 PM by Tanya Delacruz

## 2019-08-02 NOTE — TELEPHONE ENCOUNTER
[facility] states they will be pulling Parker's PICC line today and putting in a peripheral IV until a Cm is placed next week.

## 2019-08-02 NOTE — TELEPHONE ENCOUNTER
Parker,  I think that is a good idea.  I am going to have you continue oxycodone through Monday 8/12, and start the Belbuca on Tuesday 8/13.  Remember, you should wait at least 12 hours from last dose of oxycodone before taking the Belbuca, or it could make you feel some withdrawal.  If you can hold out even longer, that is good.    We will reach out to your pharmacy.  I will send in the oxycodone script AND we will make the release for the Belbuca on 8/12 to start 8/13.    MD [facility]       Nursing  1. Please change start date of Belbuca- release 8/12 to start 8/13  2. Please let pharmacy know our change of plans with the oxycodone.- new script - 6 day supply Aug 7-12th.    Jessica Jenkins MD  [facility]     Signed Prescriptions:                        Disp   Refills    oxyCODONE (ROXICODONE) 5 MG/5ML solution   360 mL 0        Sig: Take 10-15 mLs (10-15 mg) by mouth every 4 hours as           needed for pain Max 60 mg/day. Fill 8/6 to start           on 8/7/19. 6 day supply  Authorizing Provider: BRANDYN JENKINS

## 2019-08-02 NOTE — TELEPHONE ENCOUNTER
[facility] states that she is planning on returning the call to get the details of the plan of treatment for the patient. She will call us back with those details at that time.     Pratibha Ferris, CMA

## 2019-08-02 NOTE — TELEPHONE ENCOUNTER
Verbal orders on [facility] ([facility]) voicemail ok to pull PICC and do IV.     Pratibha Ferris, CMA

## 2019-08-02 NOTE — TELEPHONE ENCOUNTER
From: Parker Acevedo      Created: 8/1/2019 9:04 PM        Hi I know I'm suppose to start the other medicine on August 7th and  August 6th but on August 7th I will be having a Cm put in so I was wondering if you could do a 5 to 7 day prescription for oxycodone and then I will start the other medicine after that please let me know thank you so much

## 2019-08-05 ENCOUNTER — TELEPHONE (OUTPATIENT)
Dept: INTERVENTIONAL RADIOLOGY/VASCULAR | Facility: CLINIC | Age: 49
End: 2019-08-05

## 2019-08-05 RX ORDER — DEXTROAMPHETAMINE SACCHARATE, AMPHETAMINE ASPARTATE, DEXTROAMPHETAMINE SULFATE AND AMPHETAMINE SULFATE 5; 5; 5; 5 MG/1; MG/1; MG/1; MG/1
20 TABLET ORAL DAILY
Qty: 30 TABLET | Refills: 0 | Status: SHIPPED | OUTPATIENT
Start: 2019-08-05 | End: 2019-08-29

## 2019-08-05 RX ORDER — ERGOCALCIFEROL 1.25 MG/1
50000 CAPSULE, LIQUID FILLED ORAL WEEKLY
Qty: 12 CAPSULE | Refills: 3 | Status: SHIPPED | OUTPATIENT
Start: 2019-08-05 | End: 2020-06-02

## 2019-08-05 RX ORDER — CYANOCOBALAMIN 1000 UG/ML
1 INJECTION, SOLUTION INTRAMUSCULAR; SUBCUTANEOUS
Qty: 1 ML | Refills: 11 | Status: SHIPPED | OUTPATIENT
Start: 2019-08-05 | End: 2020-05-26

## 2019-08-05 RX ORDER — LORAZEPAM 1 MG/1
TABLET ORAL
Qty: 50 TABLET | Refills: 0 | Status: SHIPPED | OUTPATIENT
Start: 2019-08-05 | End: 2019-08-29

## 2019-08-05 NOTE — PROGRESS NOTES
This is a recent snapshot of the patient's [facility] medical record.  For current drug dose and complete information and questions, call [phone] or In Basket pool, fv home infusion ([id number])  CSN Number:  [id number]

## 2019-08-05 NOTE — TELEPHONE ENCOUNTER
Lorazepam Last Written Prescription Date:  7/10/19  Last Fill Quantity: 50,  # refills: 0   Last office visit: 10/5/2018 with prescribing provider:  Chuy Isaac    Future Office Visit:   Next 5 appointments (look out 90 days)    Sep 11, 2019  1:45 PM CDT  Return Visit with Patti Milligan MD  [facility] [place] ([facility] [place]) [address]  [place] [address]  [phone]         Adderall Last Written Prescription Date:  7/10/19  Last Fill Quantity: 30,  # refills: 0   Last office visit: 10/5/2018 with prescribing provider:  Chuy Isaac   Future Office Visit:   Next 5 appointments (look out 90 days)    Sep 11, 2019  1:45 PM CDT  Return Visit with Patti Milligan MD  [facility] [place] ([facility] [place]) [address] [address]  [phone]         Cyanocobalamin Last Written Prescription Date:  Not on Med List  Last Fill Quantity:  ,  # refills:     Last office visit: 10/5/2018 with prescribing provider:      Future Office Visit:   Next 5 appointments (look out 90 days)    Sep 11, 2019  1:45 PM CDT  Return Visit with Patti Milligan MD  [facility] [place] ([facility] [place]) [address] [address]  [phone]         Vit D2 Last Written Prescription Date:  2/8/19  Last Fill Quantity: 12,  # refills: 3   Last office visit: 10/5/2018 with prescribing provider:  Chuy Isaac   Future Office Visit:   Next 5 appointments (look out 90 days)    Sep 11, 2019  1:45 PM CDT  Return Visit with Patti Milligan MD  [facility] [place] ([facility] [place]) [address] [address]  [phone]         Requested Prescriptions   Pending Prescriptions Disp Refills     vitamin D2 (ERGOCALCIFEROL) 16182 units (1250 mcg) capsule 12 capsule 3     Sig: Take 1 capsule (50,000 Units) by mouth once a week       There is no refill protocol  "information for this order        cyanocobalamin (CYANOCOBALAMIN) 1000 MCG/ML injection 1 mL 11     Sig: Inject 1 mL (1,000 mcg) into the muscle every 30 days       Vitamin Supplements (Adult) Protocol Failed - 2019  4:25 PM        Failed - High dose Vitamin D not ordered        Passed - Recent (12 mo) or future (30 days) visit within the authorizing provider's specialty     Patient had office visit in the last 12 months or has a visit in the next 30 days with authorizing provider or within the authorizing provider's specialty.  See \"Patient Info\" tab in inbasket, or \"Choose Columns\" in Meds & Orders section of the refill encounter.              Passed - Medication is active on med list        amphetamine-dextroamphetamine (ADDERALL) 20 MG tablet 30 tablet 0     Sig: Take 1 tablet (20 mg) by mouth daily       There is no refill protocol information for this order        LORazepam (ATIVAN) 1 MG tablet 50 tablet 0     Si-2 mg as needed for anxiety with TPN and medications       There is no refill protocol information for this order        Lorazepam Routing refill request to provider for review/approval because:  Drug not on the [facility] refill protocol     Adderall Routing refill request to provider for review/approval because:  Drug not on the [facility] refill protocol     Cyanocobalamin Routing refill request to provider for review/approval because:  Drug not active on patient's medication list    Vit D2 Routing refill request to provider for review/approval because:  Drug not on the [facility] refill protocol     Cinthya Jaimes RN on 2019 at 9:27 AM                    "

## 2019-08-06 NOTE — TELEPHONE ENCOUNTER
1 RX faxed to pharmacy. 1 Signed original RX delivered to [facility] retail Pharmacy to be delivered to [facility] pharmacy . Eva,    Eva,

## 2019-08-07 ENCOUNTER — HOME INFUSION (PRE-WILLOW HOME INFUSION) (OUTPATIENT)
Dept: PHARMACY | Facility: CLINIC | Age: 49
End: 2019-08-07

## 2019-08-07 ENCOUNTER — APPOINTMENT (OUTPATIENT)
Dept: INTERVENTIONAL RADIOLOGY/VASCULAR | Facility: CLINIC | Age: 49
End: 2019-08-07
Attending: SURGERY
Payer: COMMERCIAL

## 2019-08-07 ENCOUNTER — APPOINTMENT (OUTPATIENT)
Dept: MEDSURG UNIT | Facility: CLINIC | Age: 49
End: 2019-08-07
Attending: SURGERY
Payer: COMMERCIAL

## 2019-08-07 ENCOUNTER — HOSPITAL ENCOUNTER (OUTPATIENT)
Facility: CLINIC | Age: 49
Discharge: HOME | End: 2019-08-07
Attending: SURGERY | Admitting: RADIOLOGY
Payer: COMMERCIAL

## 2019-08-07 VITALS
OXYGEN SATURATION: 98 % | DIASTOLIC BLOOD PRESSURE: 80 MMHG | SYSTOLIC BLOOD PRESSURE: 123 MMHG | HEIGHT: 71 IN | WEIGHT: 186 LBS | BODY MASS INDEX: 26.04 KG/M2 | RESPIRATION RATE: 18 BRPM | TEMPERATURE: 97.1 F | HEART RATE: 60 BPM

## 2019-08-07 DIAGNOSIS — T82.9XXA COMPLICATION ASSOCIATED WITH PERIPHERALLY INSERTED CENTRAL CATHETER, INITIAL ENCOUNTER: ICD-10-CM

## 2019-08-07 DIAGNOSIS — Z98.84 GASTRIC BYPASS STATUS FOR OBESITY: Primary | ICD-10-CM

## 2019-08-07 DIAGNOSIS — E44.0 MODERATE PROTEIN-CALORIE MALNUTRITION (H): ICD-10-CM

## 2019-08-07 DIAGNOSIS — K90.89 OTHER SPECIFIED INTESTINAL MALABSORPTION: ICD-10-CM

## 2019-08-07 LAB
B-HCG FREE SERPL-ACNC: 1.1 [IU]/L (ref 0.86–1.14)
GLUCOSE BLDC GLUCOMTR-MCNC: 83 MG/DL (ref 70–99)

## 2019-08-07 PROCEDURE — 27210732 ZZH ACCESSORY CR1

## 2019-08-07 PROCEDURE — C1751 CATH, INF, PER/CENT/MIDLINE: HCPCS

## 2019-08-07 PROCEDURE — 25000128 H RX IP 250 OP 636: Performed by: RADIOLOGY

## 2019-08-07 PROCEDURE — 27210738 ZZH ACCESSORY CR2

## 2019-08-07 PROCEDURE — 85610 PROTHROMBIN TIME: CPT

## 2019-08-07 PROCEDURE — 36558 INSERT TUNNELED CV CATH: CPT | Mod: LT

## 2019-08-07 PROCEDURE — 40000166 ZZH STATISTIC PP CARE STAGE 1

## 2019-08-07 PROCEDURE — 25800030 ZZH RX IP 258 OP 636: Performed by: PHYSICIAN ASSISTANT

## 2019-08-07 PROCEDURE — 76937 US GUIDE VASCULAR ACCESS: CPT

## 2019-08-07 PROCEDURE — 82962 GLUCOSE BLOOD TEST: CPT

## 2019-08-07 PROCEDURE — 25000128 H RX IP 250 OP 636: Performed by: PHYSICIAN ASSISTANT

## 2019-08-07 PROCEDURE — G0463 HOSPITAL OUTPT CLINIC VISIT: HCPCS

## 2019-08-07 PROCEDURE — 25000125 ZZHC RX 250: Performed by: PHYSICIAN ASSISTANT

## 2019-08-07 PROCEDURE — 99152 MOD SED SAME PHYS/QHP 5/>YRS: CPT

## 2019-08-07 PROCEDURE — 27210908 ZZH NEEDLE CR4

## 2019-08-07 RX ORDER — CLINDAMYCIN PHOSPHATE 900 MG/50ML
900 INJECTION, SOLUTION INTRAVENOUS
Status: COMPLETED | OUTPATIENT
Start: 2019-08-07 | End: 2019-08-07

## 2019-08-07 RX ORDER — CLINDAMYCIN PHOSPHATE 900 MG/50ML
900 INJECTION, SOLUTION INTRAVENOUS
Status: DISCONTINUED | OUTPATIENT
Start: 2019-08-07 | End: 2019-08-07 | Stop reason: HOSPADM

## 2019-08-07 RX ORDER — HEPARIN SODIUM,PORCINE 10 UNIT/ML
5 VIAL (ML) INTRAVENOUS EVERY 24 HOURS
Status: DISCONTINUED | OUTPATIENT
Start: 2019-08-07 | End: 2019-08-07 | Stop reason: HOSPADM

## 2019-08-07 RX ORDER — NICOTINE POLACRILEX 4 MG
15-30 LOZENGE BUCCAL
Status: DISCONTINUED | OUTPATIENT
Start: 2019-08-07 | End: 2019-08-07 | Stop reason: HOSPADM

## 2019-08-07 RX ORDER — LIDOCAINE 40 MG/G
CREAM TOPICAL
Status: DISCONTINUED | OUTPATIENT
Start: 2019-08-07 | End: 2019-08-07 | Stop reason: HOSPADM

## 2019-08-07 RX ORDER — SODIUM CHLORIDE 9 MG/ML
INJECTION, SOLUTION INTRAVENOUS CONTINUOUS
Status: DISCONTINUED | OUTPATIENT
Start: 2019-08-07 | End: 2019-08-07 | Stop reason: HOSPADM

## 2019-08-07 RX ORDER — NALOXONE HYDROCHLORIDE 0.4 MG/ML
.1-.4 INJECTION, SOLUTION INTRAMUSCULAR; INTRAVENOUS; SUBCUTANEOUS
Status: DISCONTINUED | OUTPATIENT
Start: 2019-08-07 | End: 2019-08-07 | Stop reason: HOSPADM

## 2019-08-07 RX ORDER — HEPARIN SODIUM,PORCINE 10 UNIT/ML
5-10 VIAL (ML) INTRAVENOUS
Status: DISCONTINUED | OUTPATIENT
Start: 2019-08-07 | End: 2019-08-07 | Stop reason: HOSPADM

## 2019-08-07 RX ORDER — ONDANSETRON 2 MG/ML
4 INJECTION INTRAMUSCULAR; INTRAVENOUS ONCE
Status: DISCONTINUED | OUTPATIENT
Start: 2019-08-07 | End: 2019-08-07 | Stop reason: HOSPADM

## 2019-08-07 RX ORDER — FENTANYL CITRATE 50 UG/ML
25-50 INJECTION, SOLUTION INTRAMUSCULAR; INTRAVENOUS EVERY 5 MIN PRN
Status: DISCONTINUED | OUTPATIENT
Start: 2019-08-07 | End: 2019-08-07 | Stop reason: HOSPADM

## 2019-08-07 RX ORDER — DEXTROSE MONOHYDRATE 25 G/50ML
25-50 INJECTION, SOLUTION INTRAVENOUS
Status: DISCONTINUED | OUTPATIENT
Start: 2019-08-07 | End: 2019-08-07 | Stop reason: HOSPADM

## 2019-08-07 RX ORDER — FLUMAZENIL 0.1 MG/ML
0.2 INJECTION, SOLUTION INTRAVENOUS
Status: DISCONTINUED | OUTPATIENT
Start: 2019-08-07 | End: 2019-08-07 | Stop reason: HOSPADM

## 2019-08-07 RX ORDER — HEPARIN SODIUM,PORCINE 10 UNIT/ML
5 VIAL (ML) INTRAVENOUS
Status: COMPLETED | OUTPATIENT
Start: 2019-08-07 | End: 2019-08-07

## 2019-08-07 RX ADMIN — CLINDAMYCIN PHOSPHATE 900 MG: 900 INJECTION, SOLUTION INTRAVENOUS at 10:36

## 2019-08-07 RX ADMIN — FENTANYL CITRATE 100 MCG: 50 INJECTION INTRAMUSCULAR; INTRAVENOUS at 12:08

## 2019-08-07 RX ADMIN — MIDAZOLAM 2 MG: 1 INJECTION INTRAMUSCULAR; INTRAVENOUS at 12:08

## 2019-08-07 RX ADMIN — Medication 2 ML: at 12:28

## 2019-08-07 RX ADMIN — SODIUM CHLORIDE: 9 INJECTION, SOLUTION INTRAVENOUS at 10:35

## 2019-08-07 ASSESSMENT — MIFFLIN-ST. JEOR: SCORE: 1745.56

## 2019-08-07 NOTE — DISCHARGE INSTRUCTIONS
Interventional Radiology                                                                   Discharge Instructions                                                                FollowingTunneled Catheter Placement    Your site(s) has been closed with:     The Catheter is sutured  to skin at  insertion site    Derma Peña (Skin Glue) on neck incision    Do not apply any ointments over site    This thin layer will slough off in 7-10 days               May gently remove Derma Peañ in 10 days if still present         Tunneled catheter is always covered with a Sterile Transparent dressing    Keep site clean and dry     Cover with an occlusive dressing for showering    Weekly transparent dressing changes or when it becomes wet or dirty     If there is any oozing or bleeding from the site, apply direct pressure for 5-10 minutes with a gauze pad.  If bleeding continues after 10 minutes call your primary doctor.  If bleeding cannot be controlled with direct pressure, call 911.    Call your Doctor if:    Bleeding as above    Swelling in your neck or arm    Sudden onset of shortness of breath, lightheadedness, or heart palpitations..    Fever greater than 100.5  F    Other signs of infection such as, redness, tenderness, or drainage from the wound.    If you were given sedation:    We recommend an adult stay with you for the first 24 hours.    No driving or alcoholic beverages for 24 hours.    ADDITIONAL INSTRUCTIONS:          If you are on Coumadin you may restart tonight.  Follow up Coumadin Clinic or Primary Care MD         To have your INR rechecked.           No heavy lifting greater than 10 lbs for 3 days.           May use ice pack for pain or minor swelling for 15 min 3-4 times per day.    [facility] Interventional Radiology Department    Physician: Dr David    Date:August 7, 2019  Telephone Numbers:  [phone].....8 am to 4:30 pm   Monday-Friday  [facility]  : [phone]....After hours, Weekends, & Holidays.  Ask for the Interventional Radiologist on call.  Someone is on call 24 hours a day.   Emergency Department:  [phone]

## 2019-08-07 NOTE — IP AVS SNAPSHOT
MRN:[id number]                      After Visit Summary   8/7/2019    Parker Acevedo    MRN: [id number]           Visit Information        Department      8/7/2019  9:21 AM [address] [facility]          Review of your medicines      UNREVIEWED medicines. Ask your doctor about these medicines       Dose / Directions   acetaminophen 32 mg/mL liquid  Commonly known as:  TYLENOL      Dose:  500 mg  Take 500 mg by mouth every 4 hours as needed for fever or mild pain  Refills:  0     albuterol 108 (90 Base) MCG/ACT inhaler  Commonly known as:  PROAIR HFA/PROVENTIL HFA/VENTOLIN HFA  Used for:  Productive cough      Dose:  2 puff  Inhale 2 puffs into the lungs every 4 hours as needed for shortness of breath / dyspnea or wheezing  Quantity:  1 Inhaler  Refills:  3     amphetamine-dextroamphetamine 20 MG tablet  Commonly known as:  ADDERALL  Used for:  ADHD (attention deficit hyperactivity disorder), inattentive type      Dose:  20 mg  Take 1 tablet (20 mg) by mouth daily  Quantity:  30 tablet  Refills:  0     Buprenorphine HCl 300 MCG Film buccal film  Commonly known as:  BELBUCA  Used for:  Chronic abdominal pain, Severe malnutrition (H)      Dose:  300 mcg  Place 1 Film (300 mcg) inside cheek every 12 hours  Quantity:  60 Film  Refills:  0     CARAFATE 1 GM/10ML suspension  Generic drug:  sucralfate      Refills:  2     carvedilol 6.25 MG tablet  Commonly known as:  COREG      Dose:  6.25 mg  Take 6.25 mg by mouth 2 times daily (with meals)  Refills:  0     cyanocobalamin 1000 MCG/ML injection  Commonly known as:  CYANOCOBALAMIN  Used for:  Vitamin B12 deficiency (non anemic)      Dose:  1 mL  Inject 1 mL (1,000 mcg) into the muscle every 30 days  Quantity:  1 mL  Refills:  11     diphenhydrAMINE 12.5 MG/5ML solution  Commonly known as:  BENADRYL  Used for:  Bacteremia, Positive blood culture      Dose:  25 mg  Take 10 mLs (25 mg) by mouth 3 times daily as needed for itching (Prior to vanco  "administration)  Quantity:  480 mL  Refills:  0     [facility]NA[facility]ED PATIENT  Used for:  S/P bariatric surgery      Contact [facility] for patient specific medication information at [phone] on admission and discharge from the hospital.  Phones are answered 24 hours a day 7 days a week 365 days a year.    Providers - Choose \"CONTINUE HOME MED (no script)\" at discharge if patient treatment with home infusion will continue.  Refills:  0     lidocaine 5 % patch  Commonly known as:  LIDODERM  Used for:  Chronic bilateral low back pain without sciatica, Chronic abdominal pain, Myofascial pain      Dose:  1-2 patch  Place 1-2 patches onto the skin every 24 hours Wear for 12 hours, remove for 12 hours.  OK to cut to better fit to size.  Quantity:  60 patch  Refills:  6     LORazepam 1 MG tablet  Commonly known as:  ATIVAN  Used for:  Anxiety      1-2 mg as needed for anxiety with TPN and medications  Quantity:  50 tablet  Refills:  0     naloxone 4 MG/0.1ML nasal spray  Commonly known as:  NARCAN      Dose:  4 mg  Spray 4 mg into one nostril alternating nostrils once as needed every 2-3 minutes until assistance arrives  Refills:  0     ondansetron 8 MG ODT tab  Commonly known as:  ZOFRAN-ODT  Used for:  Nausea      DISSOLVE ONE TABLET ON TONGUE EVERY 8 HOURS AS NEEDED FOR NAUSEA  Quantity:  90 tablet  Refills:  1     oxyCODONE 5 MG/5ML solution  Commonly known as:  ROXICODONE  Used for:  Encounter for long-term opiate analgesic use, Chronic abdominal pain      Dose:  10-15 mg  Take 10-15 mLs (10-15 mg) by mouth every 4 hours as needed for pain Max 60 mg/day. Fill 8/6 to start on 8/7/19. 6 day supply  Quantity:  360 mL  Refills:  0     polyethylene glycol powder  Commonly known as:  MIRALAX/GLYCOLAX  Used for:  Drug-induced constipation      Dose:  1 capful  Take 17 g (1 capful) by mouth daily  Quantity:  578 g  Refills:  11     sodium chloride 0.9 % infusion      Dose:  2655-0904 mL  Inject " "1,000-2,000 mLs into the vein as needed  Refills:  0     UNABLE TO FIND      States takes TPN 2050 ml  Every 14 hours through PICC  Refills:  0     vitamin D2 65221 units (1250 mcg) capsule  Commonly known as:  ERGOCALCIFEROL  Used for:  Vitamin D deficiency      Dose:  06773 Units  Take 1 capsule (50,000 Units) by mouth once a week  Quantity:  12 capsule  Refills:  3        CONTINUE these medicines which have NOT CHANGED       Dose / Directions   blood glucose lancets standard  Commonly known as:  NO BRAND SPECIFIED  Used for:  Elevated glucose level      Use to test blood sugar 1 times daily or as directed.  Quantity:  100 each  Refills:  1     blood glucose monitoring meter device kit  Commonly known as:  NO BRAND SPECIFIED  Used for:  Elevated glucose level      Use to test blood sugar 1 times daily or as directed.  Quantity:  1 kit  Refills:  0     blood glucose test strip  Commonly known as:  NO BRAND SPECIFIED  Used for:  Elevated glucose level      Use to test blood sugar 1 times daily or as directed.  Quantity:  100 strip  Refills:  3     Needle (Disp) 27G X 1/2\" Misc  Commonly known as:  BD DISP NEEDLES  Used for:  Bariatric surgery status      Dose:  1 Device  1 Device every 30 days Use for cyanocobalamin injection once q 30 days.  Quantity:  3 each  Refills:  4     order for DME  Used for:  Bilateral edema of lower extremity      Equipment being ordered: Bilateral knee high chronic venous insufficiency stockings--  mild-moderate pressures.  Quantity:  4 each  Refills:  5              Protect others around you: Learn how to safely use, store and throw away your medicines at www.disposemymeds.org.       Follow-ups after your visit       Your next 10 appointments already scheduled    Sep 11, 2019  1:45 PM CDT  Return Visit with Patti Milligan MD  [facility] [place] ([facility]) [address]  [place] [address] [phone]         Care Instructions     "   Further instructions from your care team                                                                        Interventional Radiology                                                                   Discharge Instructions                                                                FollowingTunneled Catheter Placement    Your site(s) has been closed with:     The Catheter is sutured  to skin at  insertion site    Derma Peña (Skin Glue) on neck incision    Do not apply any ointments over site    This thin layer will slough off in 7-10 days               May gently remove Derma Peña in 10 days if still present         Tunneled catheter is always covered with a Sterile Transparent dressing    Keep site clean and dry     Cover with an occlusive dressing for showering    Weekly transparent dressing changes or when it becomes wet or dirty     If there is any oozing or bleeding from the site, apply direct pressure for 5-10 minutes with a gauze pad.  If bleeding continues after 10 minutes call your primary doctor.  If bleeding cannot be controlled with direct pressure, call 911.    Call your Doctor if:    Bleeding as above    Swelling in your neck or arm    Sudden onset of shortness of breath, lightheadedness, or heart palpitations..    Fever greater than 100.5  F    Other signs of infection such as, redness, tenderness, or drainage from the wound.    If you were given sedation:    We recommend an adult stay with you for the first 24 hours.    No driving or alcoholic beverages for 24 hours.    ADDITIONAL INSTRUCTIONS:          If you are on Coumadin you may restart tonight.  Follow up Coumadin Clinic or Primary Care MD         To have your INR rechecked.           No heavy lifting greater than 10 lbs for 3 days.           May use ice pack for pain or minor swelling for 15 min 3-4 times per day.    [facility] Interventional Radiology Department    Physician: Dr David    Date:August 7, 2019  Telephone Numbers:   "[phone].....8 am to 4:30 pm   Monday-Friday  Hospital : [phone]....After hours, Weekends, & Holidays.  Ask for the Interventional Radiologist on call.  Someone is on call 24 hours a day.   Emergency Department:  [phone]                                                                                                                                                              Additional Information About Your Visit       MyChart Information    [employer] gives you secure access to your electronic health record. If you see a primary care provider, you can also send messages to your care team and make appointments. If you have questions, please call your primary care clinic.  If you do not have a primary care provider, please call [phone] and they will assist you.       Care EveryWhere ID    This is your Care EveryWhere ID. This could be used by other organizations to access your [facility] medical records  [id number]       Your Vitals Were     Blood Pressure   123/80          Pulse   60          Temperature   97.1  F (36.2  C) (Oral)          Respirations   18          Height   1.803 m (5' 10.98\")             Weight   84.4 kg (186 lb)    Pulse Oximetry   98%    BMI (Body Mass Index)   25.95 kg/m           Primary Care Provider Office Phone # Fax #    Chuy Isaac [phone]      Equal Access to Services    KATHRYN GUZMAN : Hadii aad ku hadasho Soomaali, waaxda luqadaha, qaybta kaalmada adeegyada, waxay idiin hayyosefn dorothy chua lagilda rizvi. So [facility] [phone].    ATENCIÓN: Si habla español, tiene a hicks disposición servicios gratuitos de asistencia lingüística. Llame al [phone].    We comply with applicable federal civil rights laws and [place] laws. We do not discriminate on the basis of race, color, national origin, age, disability, sex, sexual orientation, or gender identity.           Thank you!    Thank you for choosing [facility] for your care. Our goal is always to " "provide you with excellent care. Hearing back from our patients is one way we can continue to improve our services. Please take a few minutes to complete the written survey that you may receive in the mail after you visit with us. Thank you!            Medication List      Medications          Morning Afternoon Evening Bedtime As Needed    blood glucose lancets standard  Also known as:  NO BRAND SPECIFIED  INSTRUCTIONS:  Use to test blood sugar 1 times daily or as directed.                     blood glucose monitoring meter device kit  Also known as:  NO BRAND SPECIFIED  INSTRUCTIONS:  Use to test blood sugar 1 times daily or as directed.                     blood glucose test strip  Also known as:  NO BRAND SPECIFIED  INSTRUCTIONS:  Use to test blood sugar 1 times daily or as directed.                     Needle (Disp) 27G X 1/2\" Misc  Also known as:  BD DISP NEEDLES  INSTRUCTIONS:  1 Device every 30 days Use for cyanocobalamin injection once q 30 days.                     order for DME  INSTRUCTIONS:  Equipment being ordered: Bilateral knee high chronic venous insufficiency stockings--  mild-moderate pressures.                       ASK your doctor about these medications          Morning Afternoon Evening Bedtime As Needed    acetaminophen 32 mg/mL liquid  Also known as:  TYLENOL  INSTRUCTIONS:  Take 500 mg by mouth every 4 hours as needed for fever or mild pain                     albuterol 108 (90 Base) MCG/ACT inhaler  Also known as:  PROAIR HFA/PROVENTIL HFA/VENTOLIN HFA  INSTRUCTIONS:  Inhale 2 puffs into the lungs every 4 hours as needed for shortness of breath / dyspnea or wheezing                     amphetamine-dextroamphetamine 20 MG tablet  Also known as:  ADDERALL  INSTRUCTIONS:  Take 1 tablet (20 mg) by mouth daily                     Buprenorphine HCl 300 MCG Film buccal film  Also known as:  BELBUCA  INSTRUCTIONS:  Place 1 Film (300 mcg) inside cheek every 12 hours                     CARAFATE 1 " "GM/10ML suspension  Generic drug:  sucralfate                     carvedilol 6.25 MG tablet  Also known as:  COREG  INSTRUCTIONS:  Take 6.25 mg by mouth 2 times daily (with meals)                     cyanocobalamin 1000 MCG/ML injection  Also known as:  CYANOCOBALAMIN  INSTRUCTIONS:  Inject 1 mL (1,000 mcg) into the muscle every 30 days                     diphenhydrAMINE 12.5 MG/5ML solution  Also known as:  BENADRYL  INSTRUCTIONS:  Take 10 mLs (25 mg) by mouth 3 times daily as needed for itching (Prior to vanco administration)                     [facility] MANAGED PATIENT  INSTRUCTIONS:  Contact [facility] for patient specific medication information at [phone] on admission and discharge from the hospital.  Phones are answered 24 hours a day 7 days a week 365 days a year.    Providers - Choose \"CONTINUE HOME MED (no script)\" at discharge if patient treatment with home infusion will continue.  Doctor's comments:  Resume prior to admission TPN/Lipids/saline                     lidocaine 5 % patch  Also known as:  LIDODERM  INSTRUCTIONS:  Place 1-2 patches onto the skin every 24 hours Wear for 12 hours, remove for 12 hours.  OK to cut to better fit to size.                     LORazepam 1 MG tablet  Also known as:  ATIVAN  INSTRUCTIONS:  1-2 mg as needed for anxiety with TPN and medications                     naloxone 4 MG/0.1ML nasal spray  Also known as:  NARCAN  INSTRUCTIONS:  Spray 4 mg into one nostril alternating nostrils once as needed every 2-3 minutes until assistance arrives                     ondansetron 8 MG ODT tab  Also known as:  ZOFRAN-ODT  INSTRUCTIONS:  DISSOLVE ONE TABLET ON TONGUE EVERY 8 HOURS AS NEEDED FOR NAUSEA                     oxyCODONE 5 MG/5ML solution  Also known as:  ROXICODONE  INSTRUCTIONS:  Take 10-15 mLs (10-15 mg) by mouth every 4 hours as needed for pain Max 60 mg/day. Fill 8/6 to start on 8/7/19. 6 day supply  Doctor's comments:  Nursing will " call to explain this script and start date of upcoming script for [place]                     polyethylene glycol powder  Also known as:  MIRALAX/GLYCOLAX  INSTRUCTIONS:  Take 17 g (1 capful) by mouth daily                     sodium chloride 0.9 % infusion  INSTRUCTIONS:  Inject 1,000-2,000 mLs into the vein as needed  LAST TAKEN:  3 mLs on 8/7/2019 10:36 AM                     UNABLE TO FIND  INSTRUCTIONS:  States takes TPN 2050 ml  Every 14 hours through PICC                     vitamin D2 98502 units (1250 mcg) capsule  Also known as:  ERGOCALCIFEROL  INSTRUCTIONS:  Take 1 capsule (50,000 Units) by mouth once a week

## 2019-08-07 NOTE — IP AVS SNAPSHOT
[address] [address]                                    After Visit Summary   8/7/2019    Parker Acevedo    MRN: [id number]           After Visit Summary Signature Page    I have received my discharge instructions, and my questions have been answered. I have discussed any challenges I see with this plan with the nurse or doctor.    ..........................................................................................................................................  Patient/Patient Representative Signature      ..........................................................................................................................................  Patient Representative Print Name and Relationship to Patient    ..................................................               ................................................  Date                                   Time    ..........................................................................................................................................  Reviewed by Signature/Title    ...................................................              ..............................................  Date                                               Time          22EPIC Rev 08/18

## 2019-08-07 NOTE — PROGRESS NOTES
Patient tolerated recovery stage well. VSS, R subclavin site and insertion site just above site clean/dry/intact, no hematoma, and denies pain. Patient tolerated PO food and fluids. Teaching was done and discharge instructions were given and wife at bedside and had no questions and pt signed discharge sheets.Pt also received 2 blue clamps in case the one on his catheter breaks off. Patient ambulated, voided, and PIV was removed. Patient discharged from the hospital via wheel chair to home with wife.

## 2019-08-07 NOTE — PROCEDURES
[facility] [place]    Procedure: IR Procedure Note  Date/Time: 8/7/2019 12:33 PM  Performed by: Yaya Thomas MD  Authorized by: Yaya Thomas MD     UNIVERSAL PROTOCOL   Site Marked: NA  Prior Images Obtained and Reviewed:  Yes  Required items: Required blood products, implants, devices and special equipment available    Patient identity confirmed:  Verbally with patient  Patient was reevaluated immediately before administering moderate or deep sedation or anesthesia  Confirmation Checklist:  Patient's identity using two indicators, relevant allergies, procedure was appropriate and matched the consent or emergent situation and correct equipment/implants were available  Time out: Immediately prior to the procedure a time out was called    Preparation: Patient was prepped and draped in usual sterile fashion       ANESTHESIA    Anesthesia: Local infiltration  Local Anesthetic:  Lidocaine 1% without epinephrine      SEDATION    Patient Sedated: Yes    Sedation:  Fentanyl and midazolam  Vital signs: Vital signs monitored during sedation    See dictated procedure note for full details.  Findings: 5F x 24cm rijv sl azevedo, tip overlies junction.    Specimens: none    Complications: None    PROCEDURE   Patient Tolerance:  Patient tolerated the procedure well with no immediate complications    Time of Sedation in Minutes by Physician:  15

## 2019-08-07 NOTE — IR NOTE
Patient Name: Parker Acevedo  Medical Record Number: [id number]  Today's Date: 8/7/2019    Procedure: Tunneled Single-lumen Central Venous Catheter Placement  Proceduralist: Dr. Yaya Thomas    Sedation start time: 1155  Sedation end time: 1220  Sedation medications administered: Versed 2 mg., Fentanyl 100 mcg.  Total sedation time: 25 minutes    Procedure start time: 1200  Puncture time: 1200  Procedure end time: 1220    Report given to: CHRISTY Singh 2A  : n/a    Other Notes: Pt arrived to IR room #2 from Unit 2A. Consent reviewed. Pt denies any questions or concerns regarding procedure. Pt positioned supine and monitored per protocol. Dr. Thomas placed 5 fr x 24 cm, Single-lumen PowerHickman to right internal jugular vein, confirmed tip of catheter in atriocaval junction with fluoroscopy and approved for immediate use. Pt tolerated procedure without any noted complications. Pt transferred back to Unit 2A.

## 2019-08-07 NOTE — PROGRESS NOTES
Pt prepped for port placement.Pt did hibiclens last night and this am.He also did VALERIE wipe here on 2A.PIV placed and NS started.Clindamycin was also started.Pt ISTAT inr 1.1 and glucose is 83.Consent signed with wife at bedside and neither had any questions.Pt has an Infiltrated IV from the last couple days in his right anticub.Site cleansed with chlorhexidene and new dressing applied.

## 2019-08-07 NOTE — PROGRESS NOTES
Pt here post line placement right chest; pt awake and alert, denies pain. Site on right chest is dry and intact. Pt trying to get up out of bed, reports he is ready to go. Report to CHRISTY CATHERINE.

## 2019-08-07 NOTE — PRE-PROCEDURE
GENERAL PRE-PROCEDURE:   Procedure:  IR CVC tunnel placement  Date/Time:  8/7/2019 9:47 AM    Written consent obtained?: Yes    Risks and benefits: Risks, benefits and alternatives were discussed    Consent given by:  Patient  Patient states understanding of procedure being performed: Yes    Patient's understanding of procedure matches consent: Yes    Procedure consent matches procedure scheduled: Yes    Expected level of sedation:  Moderate  Appropriately NPO:  Yes  ASA Class:  Class 2- mild systemic disease, no acute problems, no functional limitations  Mallampati  :  Grade 2- soft palate, base of uvula, tonsillar pillars, and portion of posterior pharyngeal wall visible  Lungs:  Lungs clear with good breath sounds bilaterally  Heart:  Normal heart sounds and rate  History & Physical reviewed:  History and physical reviewed and no updates needed  Statement of review:  I have reviewed the lab findings, diagnostic data, medications, and the plan for sedation

## 2019-08-08 ENCOUNTER — HOSPITAL ENCOUNTER (OUTPATIENT)
Dept: LAB | Facility: CLINIC | Age: 49
Discharge: HOME | End: 2019-08-08
Attending: SURGERY | Admitting: SURGERY
Payer: COMMERCIAL

## 2019-08-08 ENCOUNTER — PATIENT OUTREACH (OUTPATIENT)
Dept: CARE COORDINATION | Facility: CLINIC | Age: 49
End: 2019-08-08

## 2019-08-08 ENCOUNTER — MEDICAL CORRESPONDENCE (OUTPATIENT)
Dept: HEALTH INFORMATION MANAGEMENT | Facility: CLINIC | Age: 49
End: 2019-08-08

## 2019-08-08 LAB
ANION GAP SERPL CALCULATED.3IONS-SCNC: 8 MMOL/L (ref 3–14)
BUN SERPL-MCNC: 11 MG/DL (ref 7–30)
CALCIUM SERPL-MCNC: 8.9 MG/DL (ref 8.5–10.1)
CHLORIDE SERPL-SCNC: 108 MMOL/L (ref 94–109)
CO2 SERPL-SCNC: 29 MMOL/L (ref 20–32)
CREAT SERPL-MCNC: 0.66 MG/DL (ref 0.66–1.25)
GFR SERPL CREATININE-BSD FRML MDRD: >90 ML/MIN/{1.73_M2}
GLUCOSE SERPL-MCNC: 99 MG/DL (ref 70–99)
MAGNESIUM SERPL-MCNC: 2 MG/DL (ref 1.6–2.3)
PHOSPHATE SERPL-MCNC: 3.5 MG/DL (ref 2.5–4.5)
POTASSIUM SERPL-SCNC: 3.1 MMOL/L (ref 3.4–5.3)
SODIUM SERPL-SCNC: 145 MMOL/L (ref 133–144)

## 2019-08-08 PROCEDURE — 84100 ASSAY OF PHOSPHORUS: CPT | Performed by: SURGERY

## 2019-08-08 PROCEDURE — 80048 BASIC METABOLIC PNL TOTAL CA: CPT | Performed by: SURGERY

## 2019-08-08 PROCEDURE — 83735 ASSAY OF MAGNESIUM: CPT | Performed by: SURGERY

## 2019-08-08 NOTE — PROGRESS NOTES
This is a recent snapshot of the patient's [facility] medical record.  For current drug dose and complete information and questions, call [phone] or In Basket pool, [facility] home infusion ([id number])  CSN Number:  [id number]

## 2019-08-08 NOTE — PROGRESS NOTES
Clinic Care Coordination Contact    Follow Up Progress Note      Assessment: RN CC spoke with spouse.  Patient is doing well and had a new Cm line placed yesterday which is functioning well.  Home infusion was out earlier today to draw labs.  No signs/symptoms of infection per spouse.  Patient had stopped his antibiotics as instructed by PCP per 7/24/19 MyChart encounter.     Goals addressed this encounter:   Goals Addressed                 This Visit's Progress      #1 Medical (pt-stated)   On track     Goal Statement: I want to be free of infection.  Measure of Success: Patient will remain free of any infections.  Supportive Steps to Achieve: New Cm line placed, home infusion services, follow up appointments as instructed  Barriers: chronic TPN  Strengths: care coordination, home infusion services  Date to Achieve By: 1/1/20  Patient expressed understanding of goal: yes           #2 Medication  (pt-stated)   100%     Goal Statement: I will take and complete my antibiotic course.  Measure of Success: Antibiotics are reported as taken and completed as prescribed.  Supportive Steps to Achieve: RN CC will check with PCP on other options due to intolerance of current antibiotic.  Barriers: nausea, vomiting  Strengths: care coordination, home care  Date to Achieve By: 8/1/19  Patient expressed understanding of goal: yes                Intervention/Education provided during outreach: RN CC reinforced current plan of care and importance of follow up appointments.     Outreach Frequency: monthly    Plan:   1. Patient will continue to monitor for signs/symptoms of infection.  2. Patient will follow up with providers as advised.  3. Care Coordinator will follow up in 1 month.    Melissa Behl BSN, RN, PHN  Primary Care Clinical RN Care Coordinator  [facility]   [phone]

## 2019-08-09 ENCOUNTER — TELEPHONE (OUTPATIENT)
Dept: INTERNAL MEDICINE | Facility: CLINIC | Age: 49
End: 2019-08-09

## 2019-08-09 ENCOUNTER — HOME INFUSION (PRE-WILLOW HOME INFUSION) (OUTPATIENT)
Dept: PHARMACY | Facility: CLINIC | Age: 49
End: 2019-08-09

## 2019-08-09 NOTE — TELEPHONE ENCOUNTER
Reason for Call:  Same Day Appointment, Requested Provider:  Chuy Isaac MD    PCP: Chuy Isaac    Reason for visit: Discuss a few medications - adderall and adivan     Duration of symptoms:     Have you been treated for this in the past? No    Additional comments: Pt would like to see you next week sometime to discuss meds. Can you work him in? Please call and advise.     Can we leave a detailed message on this number? YES    Phone number patient can be reached at: Other phone number:      Best Time: anytime    Call taken on 8/9/2019 at 11:16 AM by Carly Iqbal

## 2019-08-12 NOTE — TELEPHONE ENCOUNTER
Patient is scheduled at 3:45 pm on 8/19/19 with Dr. Isaac.    Pratibha Ferris, [facility]

## 2019-08-13 ENCOUNTER — MEDICAL CORRESPONDENCE (OUTPATIENT)
Dept: HEALTH INFORMATION MANAGEMENT | Facility: CLINIC | Age: 49
End: 2019-08-13

## 2019-08-13 ENCOUNTER — HOSPITAL ENCOUNTER (OUTPATIENT)
Dept: LAB | Facility: CLINIC | Age: 49
Discharge: HOME | End: 2019-08-13
Attending: SURGERY | Admitting: SURGERY
Payer: COMMERCIAL

## 2019-08-13 LAB
ALBUMIN SERPL-MCNC: 3.3 G/DL (ref 3.4–5)
ALP SERPL-CCNC: 78 U/L (ref 40–150)
ALT SERPL W P-5'-P-CCNC: 18 U/L (ref 0–70)
ANION GAP SERPL CALCULATED.3IONS-SCNC: 9 MMOL/L (ref 3–14)
AST SERPL W P-5'-P-CCNC: 15 U/L (ref 0–45)
BASOPHILS # BLD AUTO: 0 10E9/L (ref 0–0.2)
BASOPHILS NFR BLD AUTO: 0.5 %
BILIRUB SERPL-MCNC: 0.3 MG/DL (ref 0.2–1.3)
BUN SERPL-MCNC: 12 MG/DL (ref 7–30)
CALCIUM SERPL-MCNC: 8.4 MG/DL (ref 8.5–10.1)
CHLORIDE SERPL-SCNC: 111 MMOL/L (ref 94–109)
CO2 SERPL-SCNC: 27 MMOL/L (ref 20–32)
CREAT SERPL-MCNC: 0.56 MG/DL (ref 0.66–1.25)
DIFFERENTIAL METHOD BLD: ABNORMAL
EOSINOPHIL NFR BLD AUTO: 4 %
ERYTHROCYTE [DISTWIDTH] IN BLOOD BY AUTOMATED COUNT: 13 % (ref 10–15)
GFR SERPL CREATININE-BSD FRML MDRD: >90 ML/MIN/{1.73_M2}
GLUCOSE SERPL-MCNC: 91 MG/DL (ref 70–99)
HCT VFR BLD AUTO: 37.3 % (ref 40–53)
HGB BLD-MCNC: 12.3 G/DL (ref 13.3–17.7)
IMM GRANULOCYTES # BLD: 0 10E9/L (ref 0–0.4)
IMM GRANULOCYTES NFR BLD: 0.2 %
LYMPHOCYTES # BLD AUTO: 1.4 10E9/L (ref 0.8–5.3)
LYMPHOCYTES NFR BLD AUTO: 20.5 %
MAGNESIUM SERPL-MCNC: 2.1 MG/DL (ref 1.6–2.3)
MCH RBC QN AUTO: 31 PG (ref 26.5–33)
MCHC RBC AUTO-ENTMCNC: 33 G/DL (ref 31.5–36.5)
MCV RBC AUTO: 94 FL (ref 78–100)
MONOCYTES # BLD AUTO: 0.8 10E9/L (ref 0–1.3)
MONOCYTES NFR BLD AUTO: 11.9 %
NEUTROPHILS # BLD AUTO: 4.1 10E9/L (ref 1.6–8.3)
NEUTROPHILS NFR BLD AUTO: 62.9 %
NRBC # BLD AUTO: 0 10*3/UL
NRBC BLD AUTO-RTO: 0 /100
PHOSPHATE SERPL-MCNC: 3.4 MG/DL (ref 2.5–4.5)
PLATELET # BLD AUTO: 161 10E9/L (ref 150–450)
POTASSIUM SERPL-SCNC: 3.6 MMOL/L (ref 3.4–5.3)
PROT SERPL-MCNC: 6.6 G/DL (ref 6.8–8.8)
RBC # BLD AUTO: 3.97 10E12/L (ref 4.4–5.9)
SODIUM SERPL-SCNC: 147 MMOL/L (ref 133–144)
TRIGL SERPL-MCNC: 65 MG/DL
WBC # BLD AUTO: 6.6 10E9/L (ref 4–11)

## 2019-08-13 PROCEDURE — 85025 COMPLETE CBC W/AUTO DIFF WBC: CPT | Performed by: SURGERY

## 2019-08-13 PROCEDURE — 83735 ASSAY OF MAGNESIUM: CPT | Performed by: SURGERY

## 2019-08-13 PROCEDURE — 84478 ASSAY OF TRIGLYCERIDES: CPT | Performed by: SURGERY

## 2019-08-13 PROCEDURE — 80053 COMPREHEN METABOLIC PANEL: CPT | Performed by: SURGERY

## 2019-08-13 PROCEDURE — 84100 ASSAY OF PHOSPHORUS: CPT | Performed by: SURGERY

## 2019-08-13 NOTE — PROGRESS NOTES
This is a recent snapshot of the patient's [facility] medical record.  For current drug dose and complete information and questions, call [phone] or In Basket pool, [facility] home infusion ([id number])  CSN Number:  [id number]

## 2019-08-14 ENCOUNTER — HOME INFUSION (PRE-WILLOW HOME INFUSION) (OUTPATIENT)
Dept: PHARMACY | Facility: CLINIC | Age: 49
End: 2019-08-14

## 2019-08-14 ENCOUNTER — MYC MEDICAL ADVICE (OUTPATIENT)
Dept: PALLIATIVE MEDICINE | Facility: CLINIC | Age: 49
End: 2019-08-14

## 2019-08-14 NOTE — TELEPHONE ENCOUNTER
From: Parker Acevedo      Created: 8/14/2019 11:32 AM        Hi I just wanted to personally let you know that Parker did not  the medication that you ordered and does not plan on taking that one I just wanted to let you know that Thanks again Rose Acevedo          From: Jyothi Winchester, RN      Created: 8/14/2019 2:34 PM        Esther Rose, Thank you for the update. Does Parker have other plans for medication? Are there specific concerns or questions about the Belbuca that Parker has?    ERIN Lazar, RN  Care Coordinator  [facility] [place]

## 2019-08-14 NOTE — TELEPHONE ENCOUNTER
If he is not starting right away, then even if he decides that he wants to do this medication, this dose would no longer be appropriate.    So if he is not choosing to use this medication, then we need to call the pharmacy and make sure it isn't dispensed.    The med should have been started yesterday, so if not starting now, then please call pharmacy.    Jessica Milligan MD  [facility]

## 2019-08-15 ENCOUNTER — MYC MEDICAL ADVICE (OUTPATIENT)
Dept: PALLIATIVE MEDICINE | Facility: CLINIC | Age: 49
End: 2019-08-15

## 2019-08-15 ENCOUNTER — MYC MEDICAL ADVICE (OUTPATIENT)
Dept: INTERNAL MEDICINE | Facility: CLINIC | Age: 49
End: 2019-08-15

## 2019-08-15 DIAGNOSIS — Z98.84 S/P BARIATRIC SURGERY: Primary | ICD-10-CM

## 2019-08-15 NOTE — TELEPHONE ENCOUNTER
[facility]   Brit Stallings on 8/15/2019 at 4:16 PM

## 2019-08-15 NOTE — TELEPHONE ENCOUNTER
Received a call from patients wife Rose and she asked what medication we were going to prescribe for Parker.    I asked that he be put on the phone.    Spoke to Parker and he was adamant that he be returned to his old regimen of oxycodone. I reviewed with him that the reason his regimen was changed was because He was not being safe with his medication (See 7/18/19 [facility] for details about patient being out 9 days early of his oxyCODONE (ROXICODONE) 5 MG/5ML solution). I asked Parker when he last took his oxycodone and he reported it was 2 days ago.     He reports he is in pain and not having withdrawal.     Patient denied any aberrant behavior with his medication and insisted he did nothing wrong and was in contact with us when needing changes to his regimen.     Patient maintains that he does not want to use the Belbuca. This nurse has provided specific information in multiple forms pertaining to Belbuca to the patient Including the Belbuca website, a mailed brochure and Belbuca information through [employer].     Patient is also requesting Dr. Milligan call him directly. I let him know that I would pass these requests on.     Jyothi Winchester, YADIRAN, RN  Care Coordinator  [facility]

## 2019-08-15 NOTE — TELEPHONE ENCOUNTER
Spoke to pharmacy and they are reactivating the Belbuca. Patient may fill to start today.    YADIRA LazarN, RN  Care Coordinator  [facility] [place]

## 2019-08-15 NOTE — TELEPHONE ENCOUNTER
From: Parker Acevedo      Created: 8/15/2019 12:26 PM        He is willing to give the new medication a try if it helps with pain please let us know as soon as possible thanks again

## 2019-08-15 NOTE — PROGRESS NOTES
This is a recent snapshot of the patient's [facility] medical record.  For current drug dose and complete information and questions, call [phone] or In Basket pool, [facility]v home infusion ([id number])  CSN Number:  [id number]

## 2019-08-15 NOTE — TELEPHONE ENCOUNTER
Outreach X1. Left a  requesting call back. Provided call back number. Called pharmacy and the prescription for Belbuca  was cancelled.      From: Jyothi Winchester RN      Created: 8/15/2019 9:00 AM        Ok Rose, Is Parker off the oxycodone right now? The prescription for Belbuca has been  cancelled at the pharmacy. Thank you for letting us know. You can reach us here or by calling [phone] for any questions or concerns.     ERIN Lazar, RN  Care Coordinator  [facility]     From: Parker Escobar Curtis   Sent: 8/14/2019  8:13 PM CDT   To: Patti Milligan MD  Subject: RE: Question about medications    He's not for sure if it's going to help with his pain as he was reading about it he thinks it's going to mess with his mind and not work for pain because he was reading on all the ingredients in in it so I'm not sure at this time         ERIN Lazar, RN  Care Coordinator  [facility] [place]

## 2019-08-15 NOTE — TELEPHONE ENCOUNTER
Rose calling to speak to nurse regarding patient.       Please call        Carly HOWARD    [facility]

## 2019-08-15 NOTE — TELEPHONE ENCOUNTER
I spoke with both Parker and Rose.  There were no phone calls or messages between start of script to start 7/1 and them letting us know on 7/22 he was completely out.    We discussed my concerns again, and ultimately I am only offering Belbuca at this itme.    While he has been off of oxycodone for 2 days, this dose is still less than that used for addiction so told him to pick it up today    RN to call pharmacy to let the know we are prescribing it again.    Advised update next week from patient..    Jessica Milligan MD  [facility]

## 2019-08-16 NOTE — TELEPHONE ENCOUNTER
Stoney message from patient on 8/15 at 1617:          I just wanted to let you know we picked it up and my first dose started at 4pm on August 15th 2019      Per previous notes, pt is likely talking about Belbuca.     Will route to provider as FYI.     Gabbi Heath, BSN, RN-BC  Patient Care Supervisor/Care Coordinator  [facility] [place]    
Thank you, information noted.      Jessica Milligan MD  [facility]    
MDD (major depressive disorder), recurrent severe, without psychosis

## 2019-08-19 ENCOUNTER — OFFICE VISIT (OUTPATIENT)
Dept: INTERNAL MEDICINE | Facility: CLINIC | Age: 49
End: 2019-08-19
Payer: COMMERCIAL

## 2019-08-19 ENCOUNTER — MYC MEDICAL ADVICE (OUTPATIENT)
Dept: PALLIATIVE MEDICINE | Facility: CLINIC | Age: 49
End: 2019-08-19

## 2019-08-19 VITALS
HEART RATE: 84 BPM | TEMPERATURE: 97.8 F | DIASTOLIC BLOOD PRESSURE: 84 MMHG | OXYGEN SATURATION: 99 % | SYSTOLIC BLOOD PRESSURE: 128 MMHG | WEIGHT: 188 LBS | BODY MASS INDEX: 26.23 KG/M2 | RESPIRATION RATE: 16 BRPM

## 2019-08-19 DIAGNOSIS — Z98.84 S/P BARIATRIC SURGERY: ICD-10-CM

## 2019-08-19 DIAGNOSIS — Z79.891 ENCOUNTER FOR LONG-TERM OPIATE ANALGESIC USE: ICD-10-CM

## 2019-08-19 DIAGNOSIS — G89.29 CHRONIC ABDOMINAL PAIN: ICD-10-CM

## 2019-08-19 DIAGNOSIS — G89.29 CHRONIC ABDOMINAL PAIN: Primary | ICD-10-CM

## 2019-08-19 DIAGNOSIS — R10.9 CHRONIC ABDOMINAL PAIN: ICD-10-CM

## 2019-08-19 DIAGNOSIS — E43 SEVERE MALNUTRITION (H): ICD-10-CM

## 2019-08-19 DIAGNOSIS — R10.9 CHRONIC ABDOMINAL PAIN: Primary | ICD-10-CM

## 2019-08-19 DIAGNOSIS — G89.4 CHRONIC PAIN SYNDROME: Primary | ICD-10-CM

## 2019-08-19 DIAGNOSIS — Z23 NEED FOR TDAP VACCINATION: ICD-10-CM

## 2019-08-19 PROCEDURE — 90471 IMMUNIZATION ADMIN: CPT | Performed by: INTERNAL MEDICINE

## 2019-08-19 PROCEDURE — 90715 TDAP VACCINE 7 YRS/> IM: CPT | Performed by: INTERNAL MEDICINE

## 2019-08-19 PROCEDURE — 99214 OFFICE O/P EST MOD 30 MIN: CPT | Mod: 25 | Performed by: INTERNAL MEDICINE

## 2019-08-19 ASSESSMENT — PAIN SCALES - GENERAL: PAINLEVEL: SEVERE PAIN (6)

## 2019-08-19 NOTE — TELEPHONE ENCOUNTER
1. I want him to continue to use it for a week.  We will then consider going up.  2. He should make sure his mouth is wet.  He then needs to make sure the appropriate side is upwards.  He holds it to the inside of the cheek for several seconds, and it starts to stick.  If it falls out, it is still good and he shoudln't be opening new ones.  Do it over a sheet/towel to catch it as you get a hang of it.  3. Do not stop without getting permission to stop unless there is a major side effect.  We need to keep his tolerance up to make any changes.    Jessica Milligan MD  [facility]

## 2019-08-19 NOTE — PROGRESS NOTES
Subjective     Parker Acevedo is a 48 year old male who presents to clinic today for the following health issues:    HPI   Chief Complaint   Patient presents with     Pain     dicuss pain management     Having pain, switched to Belbuca last week, doesn't stay in his cheek.  Not helping his pain in the gut, cramping and burning in the stomach.    Last month felt he was throwing up more with some stomach issues, therefore went through his medication quicker.  Had issues with hydration and TPN, had problems with his PICC, now has Cm catheter.      Not having lots of withdrawal symptoms, been taking the Belbuca for 4 days.       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)      Chronic abdominal pain      Complication of anesthesia      Difficulty swallowing      Gastric ulcer, unspecified as acute or chronic, without mention of hemorrhage, perforation, or obstruction      Gastro-oesophageal reflux disease      Generalized weakness 1/30/2018     Head injury      Hiatal hernia      Other bladder disorder      Other chronic pain      PONV (postoperative nausea and vomiting)      Severe malnutrition (H)     TPN     Short gut syndrome      Tobacco abuse      Current Outpatient Medications   Medication     acetaminophen (TYLENOL) 32 mg/mL liquid     albuterol (PROAIR HFA/PROVENTIL HFA/VENTOLIN HFA) 108 (90 Base) MCG/ACT Inhaler     amphetamine-dextroamphetamine (ADDERALL) 20 MG tablet     blood glucose (NO BRAND SPECIFIED) lancets standard     blood glucose (NO BRAND SPECIFIED) test strip     blood glucose monitoring (NO BRAND SPECIFIED) meter device kit     Buprenorphine HCl (BELBUCA) 300 MCG FILM buccal film     CARAFATE 1 GM/10ML suspension     carvedilol (COREG) 6.25 MG tablet     cyanocobalamin  "(CYANOCOBALAMIN) 1000 MCG/ML injection     diphenhydrAMINE (BENADRYL) 12.5 MG/5ML solution     [facility]     lidocaine (LIDODERM) 5 % Patch     LORazepam (ATIVAN) 1 MG tablet     ondansetron (ZOFRAN-ODT) 8 MG ODT tab     order for DME     order for DME     oxyCODONE (ROXICODONE) 5 MG/5ML solution     polyethylene glycol (MIRALAX/GLYCOLAX) powder     sodium chloride 0.9% infusion     UNABLE TO FIND     vitamin D2 (ERGOCALCIFEROL) 06269 units (1250 mcg) capsule     naloxone (NARCAN) 4 MG/0.1ML nasal spray     Needle, Disp, (BD DISP NEEDLES) 27G X 1/2\" MISC     No current facility-administered medications for this visit.      Social History     Tobacco Use     Smoking status: Current Some Day Smoker     Packs/day: 0.25     Years: 3.00     Pack years: 0.75     Types: Cigarettes     Last attempt to quit: 2018     Years since quittin.0     Smokeless tobacco: Never Used     Tobacco comment: I use an e cig every now and than   Substance Use Topics     Alcohol use: No     Comment: quit      Drug use: No     Physical Exam  /84   Pulse 84   Temp 97.8  F (36.6  C) (Temporal)   Resp 16   Wt 85.3 kg (188 lb)   SpO2 99%   BMI 26.23 kg/m    General Appearance-healthy, alert, no distress-he is more alert looking better than normal when I see him today,      ASSESSMENT:  This is a 48-year-old gentleman who had a gastric bypass which gave him lots of trouble ended up with short gut after 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  He is basically TPN dependent he has had chronic picklines, Cm catheters for TPN.  He really cannot take anything orally or he has vomiting.  They have tried different G-tubes which he has however they are just for drainage.  He does have some chronic pain that he gets in his abdomen cramping and burning pains.  He has been working with the pain clinic for this.  He is been on chronic narcotics in the past including fentanyl patch " oxycodone.    Apparently he overused or used his Oxycodone too quickly in July.  He says this was because he was vomiting more with the lack of TPN and hydration he was trying to take more orally but then having vomiting of the oxycodone so then he would take an extra dose.  Due to the misuse the pain clinic and Dr. Milligan changed him to Belbuca oral or intrabuccal patches.  He has been taking those for the last 4 to 5 days on a dose of 300 mg twice a day.  He and his wife complained that he is having trouble with the patch as they fall off and that they are not effective in treating his pain he is staying in bed having abdominal pain not getting all of his TPN because he stops it early due to the pain.  When questioned he does agree with me that is not having any withdrawal from not being on the oxycodone, the Belbuca is helping with that but is not treating his pain.    I instructed the patient to try to set up an early appointment with his pain clinic and talk about different options possibly reducing his oxycodone if the Belbuca is working.  Possibly going onto a patch system which will not be affected by vomiting.  I think with his stomach system and previous surgeries he will always have some vomiting issues and be vomiting up medications.  He has been on fentanyl in the past I do not know if that is a good option for him or not.  I told him I would not do narcotics for him and that he really needs a pain clinic as he is complex.    I spent greater than 50% of this 25 minute visit in counseling and coordination of care of chronic pain and medications.    Electronically signed by Chuy Isaac MD  .

## 2019-08-19 NOTE — PROGRESS NOTES
Screening Questionnaire for Adult Immunization    Are you sick today?   No   Do you have allergies to medications, food, a vaccine component or latex?   Yes   Have you ever had a serious reaction after receiving a vaccination?   No   Do you have a long-term health problem with heart disease, lung disease, asthma, kidney disease, metabolic disease (e.g. diabetes), anemia, or other blood disorder?   Yes   Do you have cancer, leukemia, HIV/AIDS, or any other immune system problem?   No   In the past 3 months, have you taken medications that affect  your immune system, such as prednisone, other steroids, or anticancer drugs; drugs for the treatment of rheumatoid arthritis, Crohn s disease, or psoriasis; or have you had radiation treatments?   No   Have you had a seizure, or a brain or other nervous system problem?   No   During the past year, have you received a transfusion of blood or blood     products, or been given immune (gamma) globulin or antiviral drug?   No   For women: Are you pregnant or is there a chance you could become        pregnant during the next month?   No   Have you received any vaccinations in the past 4 weeks?   No     Immunization questionnaire was positive for at least one answer.  Notified yes.        Per orders of Dr. Chuy Isaac, injection of Tdap given by Jael Mir. Patient instructed to remain in clinic for 15 minutes afterwards, and to report any adverse reaction to me immediately.       Screening performed by Jael Mir on 8/19/2019 at 4:31 PM.

## 2019-08-19 NOTE — TELEPHONE ENCOUNTER
From: Parker Acevedo      Created: 8/19/2019 10:08 AM        So I'm writing to you to let you know we picked up the medication and on August 16th st 4:00am he had a film that would not stick at all and I had to open another one and the same thing happened on August 19th at 4:00am where the film fell out of his mouth and did not stick other than that the film does not help with the pain at all so I just wanted to let you know if you have any questions feel free to call us at 3  thanks again Rose Acevedo

## 2019-08-20 ENCOUNTER — HOSPITAL ENCOUNTER (OUTPATIENT)
Dept: LAB | Facility: CLINIC | Age: 49
Discharge: HOME | End: 2019-08-20
Attending: SURGERY | Admitting: SURGERY
Payer: COMMERCIAL

## 2019-08-20 LAB
ALBUMIN SERPL-MCNC: 3.5 G/DL (ref 3.4–5)
ALP SERPL-CCNC: 82 U/L (ref 40–150)
ALT SERPL W P-5'-P-CCNC: 18 U/L (ref 0–70)
ANION GAP SERPL CALCULATED.3IONS-SCNC: 7 MMOL/L (ref 3–14)
AST SERPL W P-5'-P-CCNC: 12 U/L (ref 0–45)
BILIRUB SERPL-MCNC: 0.4 MG/DL (ref 0.2–1.3)
BUN SERPL-MCNC: 11 MG/DL (ref 7–30)
CALCIUM SERPL-MCNC: 8.6 MG/DL (ref 8.5–10.1)
CHLORIDE SERPL-SCNC: 107 MMOL/L (ref 94–109)
CO2 SERPL-SCNC: 28 MMOL/L (ref 20–32)
CREAT SERPL-MCNC: 0.66 MG/DL (ref 0.66–1.25)
GFR SERPL CREATININE-BSD FRML MDRD: >90 ML/MIN/{1.73_M2}
GLUCOSE SERPL-MCNC: 90 MG/DL (ref 70–99)
MAGNESIUM SERPL-MCNC: 2.1 MG/DL (ref 1.6–2.3)
PHOSPHATE SERPL-MCNC: 3.7 MG/DL (ref 2.5–4.5)
POTASSIUM SERPL-SCNC: 3.6 MMOL/L (ref 3.4–5.3)
PROT SERPL-MCNC: 6.9 G/DL (ref 6.8–8.8)
SODIUM SERPL-SCNC: 142 MMOL/L (ref 133–144)

## 2019-08-20 PROCEDURE — 83735 ASSAY OF MAGNESIUM: CPT | Performed by: SURGERY

## 2019-08-20 PROCEDURE — 80053 COMPREHEN METABOLIC PANEL: CPT | Performed by: SURGERY

## 2019-08-20 PROCEDURE — 84100 ASSAY OF PHOSPHORUS: CPT | Performed by: SURGERY

## 2019-08-21 ENCOUNTER — MYC MEDICAL ADVICE (OUTPATIENT)
Dept: PALLIATIVE MEDICINE | Facility: CLINIC | Age: 49
End: 2019-08-21

## 2019-08-21 RX ORDER — OXYCODONE HCL 5 MG/5 ML
10 SOLUTION, ORAL ORAL 4 TIMES DAILY PRN
Qty: 600 ML | Refills: 0 | Status: SHIPPED | OUTPATIENT
Start: 2019-08-21 | End: 2019-09-12

## 2019-08-21 NOTE — TELEPHONE ENCOUNTER
This is being managed in a separate encounter.    YADIRA LazarN, RN  Care Coordinator  [facility] [place]

## 2019-08-21 NOTE — TELEPHONE ENCOUNTER
I spoke with Parker and Vandana,  He states pain is not controlled, although it is helping him have ups and downs from meds.  He also have a chest tightness and breathing challenge with each breath, although not as bad as first days.  He wants to continue until new med available.    We discussed pain pumps. He would need to go outside [place], and he doesn't want to do that.  I would feel more comfortable with fentanyl patches than I would with oxycodone.  He had some problems with fevers and patches falling off. We discussed that it is harder to fill meds early, and he needs to find ways to get the patches to stay on.  If a significant fever, patch needs to come off.    I will make transition from Belbuca to oxycodone for short term, then over to fentanyl.  I think this is safer.      Plan:  Parker,  As we discussed  1)  the oxycodone when available.  You will be using no more than 10mg 4 times daily.    2) the first dose should be 12 hours after last Belbuca.  You may find that it works better after a couple of days  3.) I am putting in a 2 week dose.  Then we will have you use fentanyl 25mcg/hr patches.  4) bring remaining Belbuca to your next appt    I am willing to talk to you about medical marijuana. We can do that next visit.  You can move up your visit if you would like.    Jessica Jenkins MD  [facility]     Signed Prescriptions:                        Disp   Refills    oxyCODONE (ROXICODONE) 5 MG/5ML solution   600 mL 0        Sig: Take 10 mLs (10 mg) by mouth 4 times daily as needed           for pain . Put at least 4 hours between doses.           Stop Belbuca. 15 day supply  Authorizing Provider: BRANDYN JENKINS    Pain RN- I did not pend the order for fentanyl patch.  When we have start date for oxycodone liquid, then we can plan for 15 days later- need to be Mylan brand only.

## 2019-08-21 NOTE — TELEPHONE ENCOUNTER
From: Parker Acevedo      Created: 8/21/2019 10:25 AM        Here's an update on how the medication is working /side effects  1. Hard to breathe for a couple hours  2. Sore chest / tight  3. Major headaches   4. Major pain 8 to 10 seams worse than before plus back and chest pain  5. Very in active no energy   6. Very high irriatabilty   7. Plus been taking tylenol for the headaches   8. Pain is not relived at all   Things to think about   1. Go back to what I was on  2. Pain pump   3. Marijuana/ clinic  Medicine I'm on not working at all if any questions feel free to call me thanks

## 2019-08-23 ENCOUNTER — TELEPHONE (OUTPATIENT)
Dept: INTERNAL MEDICINE | Facility: CLINIC | Age: 49
End: 2019-08-23

## 2019-08-23 ENCOUNTER — HOME INFUSION (PRE-WILLOW HOME INFUSION) (OUTPATIENT)
Dept: PHARMACY | Facility: CLINIC | Age: 49
End: 2019-08-23

## 2019-08-23 ENCOUNTER — MEDICAL CORRESPONDENCE (OUTPATIENT)
Dept: HEALTH INFORMATION MANAGEMENT | Facility: CLINIC | Age: 49
End: 2019-08-23

## 2019-08-23 NOTE — TELEPHONE ENCOUNTER
Reason for Call:  Form, our goal is to have forms completed with 72 hours, however, some forms may require a visit or additional information.    Type of letter, form or note:  Home Health Certification    Who is the form from?: Home care    Where did the form come from: form was faxed in    What clinic location was the form placed at?: [facility]    Where the form was placed: Given to MA/RN    What number is listed as a contact on the form?:        Additional comments:     Call taken on 8/23/2019 at 2:33 PM by Teetee Parada

## 2019-08-26 ENCOUNTER — TELEPHONE (OUTPATIENT)
Dept: PALLIATIVE MEDICINE | Facility: CLINIC | Age: 49
End: 2019-08-26

## 2019-08-26 ENCOUNTER — MYC MEDICAL ADVICE (OUTPATIENT)
Dept: PALLIATIVE MEDICINE | Facility: CLINIC | Age: 49
End: 2019-08-26

## 2019-08-26 DIAGNOSIS — Z53.9 DIAGNOSIS NOT YET DEFINED: Primary | ICD-10-CM

## 2019-08-26 PROCEDURE — G0179 MD RECERTIFICATION HHA PT: HCPCS | Performed by: INTERNAL MEDICINE

## 2019-08-26 RX ORDER — FENTANYL 25 UG/1
1 PATCH TRANSDERMAL
Qty: 10 PATCH | Refills: 0 | Status: SHIPPED | OUTPATIENT
Start: 2019-08-26 | End: 2019-08-30

## 2019-08-26 NOTE — TELEPHONE ENCOUNTER
"\"Pain RN- I did not pend the order for fentanyl patch.  When we have start date for oxycodone liquid, then we can plan for 15 days later- need to be Mylan brand only.\"      He should have started by now, could we please find out date, and write for fentanyl patches    Jessica Milligan MD  [facility]   "

## 2019-08-26 NOTE — PROGRESS NOTES
This is a recent snapshot of the patient's [facility] medical record.  For current drug dose and complete information and questions, call [phone] or In Basket pool, fv home infusion ([id number])  CSN Number:  [id number]

## 2019-08-26 NOTE — TELEPHONE ENCOUNTER
Wife aware of due date.    Signed Prescriptions:                        Disp   Refills    oxyCODONE (ROXICODONE) 5 MG/5ML solution   600 mL 0        Sig: Take 10 mLs (10 mg) by mouth 4 times daily as needed           for pain . Put at least 4 hours between doses.           Stop Belbuca. 15 day supply  Authorizing Provider: BRANDYN JENKINS    fentaNYL (DURAGESIC) 25 mcg/hr 72 hr patch 10 pat*0        Sig: Place 1 patch onto the skin every 72 hours remove old           patch.  May fill 9/3/19 and start 9/4/19. Stop           oxycodone  Authorizing Provider: BRANDYN JENKINS MD  [facility]

## 2019-08-26 NOTE — TELEPHONE ENCOUNTER
PA needed for:Fentanyl 25 mcg  Insurance:[employer] part D  Insur phone:[phone]  Patient ID:[id number]  Please let us know when PA is granted/denied. Thank you!  Sharon Fair, Pharmacy [facility] [phone]

## 2019-08-27 NOTE — TELEPHONE ENCOUNTER
Prior Authorization Specialty Medication Request    Medication/Dose: Fentanyl patch  ICD code (if different than what is on RX):    Chronic pain syndrome [G89.4]  - Primary       Chronic abdominal pain [R10.9, G89.29]       Severe malnutrition (H) [E43]         Previously Tried and Failed:.     Important Lab Values: .  Rationale:.    Insurance Name: [facility]  Insurance ID: [id number]   Insurance Phone Number: [phone]

## 2019-08-27 NOTE — TELEPHONE ENCOUNTER
Central Prior Authorization Team   Phone: [phone]    Prior Authorization Approval    Authorization Effective Date: 5/29/2019  Authorization Expiration Date: 8/27/2020  Medication: Fentanyl patch  Approved Dose/Quantity:   Reference #: [id number]   Insurance Company: [employer] [place] - Phone [phone] Fax [phone]  Expected CoPay:       CoPay Card Available:      Foundation Assistance Needed:    Which Pharmacy is filling the prescription (Not needed for infusion/clinic administered): [facility] [place] [address] [place] [address]  Pharmacy Notified: Yes  Patient Notified: Yes  **Instructed pharmacy to notify patient when script is ready to /ship.**

## 2019-08-27 NOTE — TELEPHONE ENCOUNTER
Central Prior Authorization Team   Phone: [phone]    PA Initiation    Medication: Fentanyl patch  Insurance Company: [facility] - Phone [phone] Fax [phone]  Pharmacy Filling the Rx: [facility] [place], [place] - [address] [place] [address]  Filling Pharmacy Phone: [phone]  Filling Pharmacy Fax:    Start Date: 8/27/2019    SARA HASSAN (Key: [id number])

## 2019-08-29 ENCOUNTER — MYC MEDICAL ADVICE (OUTPATIENT)
Dept: PALLIATIVE MEDICINE | Facility: CLINIC | Age: 49
End: 2019-08-29

## 2019-08-29 ENCOUNTER — MYC REFILL (OUTPATIENT)
Dept: FAMILY MEDICINE | Facility: CLINIC | Age: 49
End: 2019-08-29

## 2019-08-29 ENCOUNTER — MYC REFILL (OUTPATIENT)
Dept: PALLIATIVE MEDICINE | Facility: CLINIC | Age: 49
End: 2019-08-29

## 2019-08-29 DIAGNOSIS — R11.0 NAUSEA: ICD-10-CM

## 2019-08-29 DIAGNOSIS — F41.9 ANXIETY: ICD-10-CM

## 2019-08-29 DIAGNOSIS — G89.29 CHRONIC ABDOMINAL PAIN: ICD-10-CM

## 2019-08-29 DIAGNOSIS — R10.9 CHRONIC ABDOMINAL PAIN: ICD-10-CM

## 2019-08-29 DIAGNOSIS — E43 SEVERE MALNUTRITION (H): ICD-10-CM

## 2019-08-29 DIAGNOSIS — Z79.891 ENCOUNTER FOR LONG-TERM OPIATE ANALGESIC USE: ICD-10-CM

## 2019-08-29 DIAGNOSIS — G89.4 CHRONIC PAIN SYNDROME: ICD-10-CM

## 2019-08-29 DIAGNOSIS — F90.0 ADHD (ATTENTION DEFICIT HYPERACTIVITY DISORDER), INATTENTIVE TYPE: ICD-10-CM

## 2019-08-29 RX ORDER — ONDANSETRON 8 MG/1
TABLET, ORALLY DISINTEGRATING ORAL
Qty: 90 TABLET | Refills: 1 | OUTPATIENT
Start: 2019-08-29

## 2019-08-29 RX ORDER — OXYCODONE HCL 5 MG/5 ML
10 SOLUTION, ORAL ORAL 4 TIMES DAILY PRN
Qty: 600 ML | Refills: 0 | Status: CANCELLED | OUTPATIENT
Start: 2019-08-29

## 2019-08-29 NOTE — TELEPHONE ENCOUNTER
adderall and lorazepam  Last Written Prescription Date:  08/05/19  Last Fill Quantity: 30, 50  # refills: 0, 0  Last Office Visit: 08/19/19  Future Office visit:    Next 5 appointments (look out 90 days)    Sep 11, 2019  1:45 PM CDT  Return Visit with Patti Milligan MD  [facility] [place] ([facility] [place]) [address] [facility]  [place] [address]  [phone]           Routing refill request to provider for review/approval because:  Drug not on the [facility], [facility] or [facility] refill protocol or controlled substance

## 2019-08-29 NOTE — TELEPHONE ENCOUNTER
"Requested Prescriptions   Pending Prescriptions Disp Refills     ondansetron (ZOFRAN-ODT) 8 MG ODT tab 90 tablet 1   Last Written Prescription Date:  7/25/2019  Last Fill Quantity: 90,  # refills: 1   Last office visit: 7/10/2019 with prescribing provider:     Future Office Visit:   Next 5 appointments (look out 90 days)    Sep 11, 2019  1:45 PM CDT  Return Visit with Patti Milligan MD  [facility] ([facility] [address] [facility] [address]  [phone]              Antivertigo/Antiemetic Agents Passed - 8/29/2019  3:51 PM        Passed - Recent (12 mo) or future (30 days) visit within the authorizing provider's specialty     Patient had office visit in the last 12 months or has a visit in the next 30 days with authorizing provider or within the authorizing provider's specialty.  See \"Patient Info\" tab in inbasket, or \"Choose Columns\" in Meds & Orders section of the refill encounter.              Passed - Medication is active on med list        Passed - Patient is 18 years of age or older      Denied  Refill current  Ella Hammond RN      "

## 2019-08-29 NOTE — TELEPHONE ENCOUNTER
Stoney message from patient on 8/29 at 1525:    Parker Acevedo would like a refill of the following medications:         oxyCODONE (ROXICODONE) 5 MG/5ML solution [Patti Milligan MD]     Preferred pharmacy: [facility] - [address]   ------------    Will route to MA pool for assistance with gathering opioid refill information.    YADIRA CrookN, RN-BC  Patient Care Supervisor/Care Coordinator  [facility]

## 2019-08-30 DIAGNOSIS — F41.9 ANXIETY: ICD-10-CM

## 2019-08-30 DIAGNOSIS — F90.0 ADHD (ATTENTION DEFICIT HYPERACTIVITY DISORDER), INATTENTIVE TYPE: ICD-10-CM

## 2019-08-30 RX ORDER — LORAZEPAM 1 MG/1
TABLET ORAL
Qty: 50 TABLET | Refills: 0 | Status: CANCELLED | OUTPATIENT
Start: 2019-08-30

## 2019-08-30 RX ORDER — FENTANYL 25 UG/1
1 PATCH TRANSDERMAL
Qty: 15 PATCH | Refills: 0 | Status: ON HOLD | OUTPATIENT
Start: 2019-08-30 | End: 2019-09-27

## 2019-08-30 RX ORDER — LORAZEPAM 1 MG/1
TABLET ORAL
Qty: 50 TABLET | Refills: 0 | Status: SHIPPED | OUTPATIENT
Start: 2019-08-30 | End: 2019-10-07

## 2019-08-30 RX ORDER — DEXTROAMPHETAMINE SACCHARATE, AMPHETAMINE ASPARTATE, DEXTROAMPHETAMINE SULFATE AND AMPHETAMINE SULFATE 5; 5; 5; 5 MG/1; MG/1; MG/1; MG/1
20 TABLET ORAL DAILY
Qty: 30 TABLET | Refills: 0 | Status: SHIPPED | OUTPATIENT
Start: 2019-08-30 | End: 2019-10-07

## 2019-08-30 RX ORDER — DEXTROAMPHETAMINE SACCHARATE, AMPHETAMINE ASPARTATE, DEXTROAMPHETAMINE SULFATE AND AMPHETAMINE SULFATE 5; 5; 5; 5 MG/1; MG/1; MG/1; MG/1
20 TABLET ORAL DAILY
Qty: 30 TABLET | Refills: 0 | Status: CANCELLED | OUTPATIENT
Start: 2019-08-30

## 2019-08-30 NOTE — TELEPHONE ENCOUNTER
Please review below  We also need to plan that fentanyl is 20 day supply      Meir,  These conversations have become too much for doing over OsurvBloomburg.  As we make changes, we always can discuss and reassess, but I have made it clear to you that changes are being made due to misuse of medications.  Therefore I am not willing to continue with the oxycodone medication, and doses may change as part of this process..  As I have explained, I have given chances in the past when small misuse occurred, but recent issues have been severe and not tolerable.  I am not willing to prescribe the oxycodone.    So we will plan on the fentanyl alone. From this point forward, we need to plan for appointments for making changing of medications like you are asking. I am not willing to do a dosing change request on OsurvBlo[facility]urg anymore.    I apologize that I didn't see that you were on 48 hour dosing in the past. You can change to do a change of the fentanyl patch every 48 hours and we can plan that your current script will only last 20 days.    If you want to do the pain pump, then you would need to be referred to a pain clinic outside of [facility].  If that is what you would like to do that is fine-- they would manage your pain after that point.  You need to let me know if you want to pursue that.    I am recommending after our appt next week that you make more frequent appointments- as again the requests and discussion has become too complex for occurring over OsurvBlo[facility]urg and this needs to be done in person.    Jessica Milligan MD  [facility]

## 2019-08-30 NOTE — TELEPHONE ENCOUNTER
2 Signed original RX delivered to [facility] to be delivered to [facility] . Eva,

## 2019-08-30 NOTE — TELEPHONE ENCOUNTER
This is being managed in an additional MyChart started by patient. Closing.    YADIRA LazarN, RN  Care Coordinator  [facility] [place]

## 2019-08-30 NOTE — TELEPHONE ENCOUNTER
Called pharmacy and canceled  Fentanyl 25 mcg every 72 hours script. Pended Fentanyl 25 mcg every 48 hours qty 15 for review. Pharmacist reports she will run it to see if it requires a PA.    YADIRA LazarN, RN  Care Coordinator  [facility] [place]

## 2019-08-30 NOTE — TELEPHONE ENCOUNTER
This medication will be discontinued for this patient. A 15 day supply was provided on 8/21/19 to last until he starts his Fentanyl on 9/5/19.     YADIRA LazarN, RN  Care Coordinator  [facility] [place]

## 2019-08-30 NOTE — TELEPHONE ENCOUNTER
Received call from patient requesting refill(s) of oxyCODONE (ROXICODONE) 5 MG/5ML solution    Last picked up from pharmacy on 08/22/19     Pt last seen by prescribing provider on 06/12/19  Next appt scheduled for 09/11/19     checked in the past 6 months? Yes If no, print current report and give to RN    Last urine drug screen date 10/29/18  Current opioid agreement on file (completed within the last year) Yes Date of opioid agreement: 10/29/18    Processing (pick one and delete the others):      E-prescribe to pharmacy-[facility] - [place], [place] - [address]     Will route to nursing pool for review and preparation of prescription(s).

## 2019-08-30 NOTE — TELEPHONE ENCOUNTER
Signed Prescriptions:                        Disp   Refills    fentaNYL (DURAGESIC) 25 mcg/hr 72 hr patch 15 pat*0        Sig: Place 1 patch onto the skin every 48 hours remove old           patch.   May fill 9/3/19 and start 9/5/19. Stop           oxycodone  Authorizing Provider: BRANDYN JENKINS      Note sent to patient.    Jessica Jenkins MD  [facility]

## 2019-09-03 ENCOUNTER — MYC MEDICAL ADVICE (OUTPATIENT)
Dept: INTERNAL MEDICINE | Facility: CLINIC | Age: 49
End: 2019-09-03

## 2019-09-03 DIAGNOSIS — F90.0 ADHD (ATTENTION DEFICIT HYPERACTIVITY DISORDER), INATTENTIVE TYPE: ICD-10-CM

## 2019-09-03 DIAGNOSIS — Z98.84 GASTRIC BYPASS STATUS FOR OBESITY: Primary | ICD-10-CM

## 2019-09-03 DIAGNOSIS — F41.9 ANXIETY: ICD-10-CM

## 2019-09-03 RX ORDER — DEXTROAMPHETAMINE SACCHARATE, AMPHETAMINE ASPARTATE, DEXTROAMPHETAMINE SULFATE AND AMPHETAMINE SULFATE 5; 5; 5; 5 MG/1; MG/1; MG/1; MG/1
20 TABLET ORAL DAILY
Qty: 30 TABLET | Refills: 0 | Status: CANCELLED | OUTPATIENT
Start: 2019-09-03

## 2019-09-03 RX ORDER — SUCRALFATE 1 G/1
1 TABLET ORAL 4 TIMES DAILY
Qty: 120 TABLET | Refills: 3 | Status: SHIPPED | OUTPATIENT
Start: 2019-09-03 | End: 2020-07-28

## 2019-09-03 RX ORDER — LORAZEPAM 1 MG/1
TABLET ORAL
Qty: 50 TABLET | Refills: 0 | Status: CANCELLED | OUTPATIENT
Start: 2019-09-03

## 2019-09-03 NOTE — TELEPHONE ENCOUNTER
Didn't get the 2 rxs from 08-30-19 unless sent  on the way or can still resend electronically  Sharon Fair, Pharmacy [facility] [phone]

## 2019-09-03 NOTE — ED NOTES
"Pt up to the bathroom and reports passing \" a lot of gas\". Writer noted brown liquid in toilet.   " 21 yr old male presents to the ED because he lost the ointment given to him yesterday in the ED.  He was seen for a rash around his lips.  No new complaints.  No fever.  Physical Exam: VS reviewed. Pt is well appearing, in no respiratory distress. MMM. Cap refill <2 seconds. Dry scaling skin around lips, no vesicles, no blisters, no lip swelling. Chest with no retractions, no distress. Neuro exam grossly intact.  Plan: Bacitracin given and outpt adolescent clinic follow up advised.

## 2019-09-10 ENCOUNTER — HOSPITAL ENCOUNTER (OUTPATIENT)
Dept: LAB | Facility: CLINIC | Age: 49
Discharge: HOME | End: 2019-09-10
Attending: SURGERY | Admitting: SURGERY
Payer: COMMERCIAL

## 2019-09-10 LAB
ALBUMIN SERPL-MCNC: 3.3 G/DL (ref 3.4–5)
ALP SERPL-CCNC: 81 U/L (ref 40–150)
ALT SERPL W P-5'-P-CCNC: 22 U/L (ref 0–70)
ANION GAP SERPL CALCULATED.3IONS-SCNC: 7 MMOL/L (ref 3–14)
AST SERPL W P-5'-P-CCNC: 16 U/L (ref 0–45)
BASOPHILS # BLD AUTO: 0 10E9/L (ref 0–0.2)
BASOPHILS NFR BLD AUTO: 0.6 %
BILIRUB DIRECT SERPL-MCNC: 0.2 MG/DL (ref 0–0.2)
BILIRUB SERPL-MCNC: 0.7 MG/DL (ref 0.2–1.3)
BUN SERPL-MCNC: 10 MG/DL (ref 7–30)
CALCIUM SERPL-MCNC: 8.5 MG/DL (ref 8.5–10.1)
CHLORIDE SERPL-SCNC: 111 MMOL/L (ref 94–109)
CO2 SERPL-SCNC: 28 MMOL/L (ref 20–32)
CREAT SERPL-MCNC: 0.59 MG/DL (ref 0.66–1.25)
DIFFERENTIAL METHOD BLD: ABNORMAL
EOSINOPHIL NFR BLD AUTO: 2.2 %
ERYTHROCYTE [DISTWIDTH] IN BLOOD BY AUTOMATED COUNT: 13.3 % (ref 10–15)
GFR SERPL CREATININE-BSD FRML MDRD: >90 ML/MIN/{1.73_M2}
GLUCOSE SERPL-MCNC: 86 MG/DL (ref 70–99)
HCT VFR BLD AUTO: 38 % (ref 40–53)
HGB BLD-MCNC: 12.5 G/DL (ref 13.3–17.7)
IMM GRANULOCYTES # BLD: 0 10E9/L (ref 0–0.4)
IMM GRANULOCYTES NFR BLD: 0.3 %
LYMPHOCYTES # BLD AUTO: 1.5 10E9/L (ref 0.8–5.3)
LYMPHOCYTES NFR BLD AUTO: 20.8 %
MAGNESIUM SERPL-MCNC: 2 MG/DL (ref 1.6–2.3)
MCH RBC QN AUTO: 30.8 PG (ref 26.5–33)
MCHC RBC AUTO-ENTMCNC: 32.9 G/DL (ref 31.5–36.5)
MCV RBC AUTO: 94 FL (ref 78–100)
MONOCYTES # BLD AUTO: 0.8 10E9/L (ref 0–1.3)
MONOCYTES NFR BLD AUTO: 11 %
NEUTROPHILS # BLD AUTO: 4.7 10E9/L (ref 1.6–8.3)
NEUTROPHILS NFR BLD AUTO: 65.1 %
NRBC # BLD AUTO: 0 10*3/UL
NRBC BLD AUTO-RTO: 0 /100
PHOSPHATE SERPL-MCNC: 3.1 MG/DL (ref 2.5–4.5)
PLATELET # BLD AUTO: 180 10E9/L (ref 150–450)
POTASSIUM SERPL-SCNC: 3.6 MMOL/L (ref 3.4–5.3)
PROT SERPL-MCNC: 6.4 G/DL (ref 6.8–8.8)
RBC # BLD AUTO: 4.06 10E12/L (ref 4.4–5.9)
SODIUM SERPL-SCNC: 146 MMOL/L (ref 133–144)
TRIGL SERPL-MCNC: 53 MG/DL
WBC # BLD AUTO: 7.2 10E9/L (ref 4–11)

## 2019-09-10 PROCEDURE — 85025 COMPLETE CBC W/AUTO DIFF WBC: CPT | Performed by: SURGERY

## 2019-09-10 PROCEDURE — 84478 ASSAY OF TRIGLYCERIDES: CPT | Performed by: SURGERY

## 2019-09-10 PROCEDURE — 84100 ASSAY OF PHOSPHORUS: CPT | Performed by: SURGERY

## 2019-09-10 PROCEDURE — 82248 BILIRUBIN DIRECT: CPT | Performed by: SURGERY

## 2019-09-10 PROCEDURE — 80053 COMPREHEN METABOLIC PANEL: CPT | Performed by: SURGERY

## 2019-09-10 PROCEDURE — 83735 ASSAY OF MAGNESIUM: CPT | Performed by: SURGERY

## 2019-09-11 ENCOUNTER — OFFICE VISIT (OUTPATIENT)
Dept: PALLIATIVE MEDICINE | Facility: CLINIC | Age: 49
End: 2019-09-11
Payer: COMMERCIAL

## 2019-09-11 VITALS
BODY MASS INDEX: 27.21 KG/M2 | DIASTOLIC BLOOD PRESSURE: 93 MMHG | SYSTOLIC BLOOD PRESSURE: 138 MMHG | WEIGHT: 195 LBS | HEART RATE: 81 BPM

## 2019-09-11 DIAGNOSIS — G89.4 CHRONIC PAIN SYNDROME: Primary | ICD-10-CM

## 2019-09-11 DIAGNOSIS — E43 SEVERE MALNUTRITION (H): ICD-10-CM

## 2019-09-11 DIAGNOSIS — R10.9 CHRONIC ABDOMINAL PAIN: ICD-10-CM

## 2019-09-11 DIAGNOSIS — G89.29 CHRONIC ABDOMINAL PAIN: ICD-10-CM

## 2019-09-11 DIAGNOSIS — K90.829 SHORT GUT SYNDROME: ICD-10-CM

## 2019-09-11 DIAGNOSIS — Z79.891 ENCOUNTER FOR LONG-TERM OPIATE ANALGESIC USE: ICD-10-CM

## 2019-09-11 PROCEDURE — 99000 SPECIMEN HANDLING OFFICE-LAB: CPT | Performed by: PSYCHIATRY & NEUROLOGY

## 2019-09-11 PROCEDURE — 99215 OFFICE O/P EST HI 40 MIN: CPT | Performed by: PSYCHIATRY & NEUROLOGY

## 2019-09-11 PROCEDURE — 80307 DRUG TEST PRSMV CHEM ANLYZR: CPT | Mod: 90 | Performed by: PSYCHIATRY & NEUROLOGY

## 2019-09-11 ASSESSMENT — PAIN SCALES - GENERAL: PAINLEVEL: SEVERE PAIN (7)

## 2019-09-11 NOTE — LETTER
[facility]  09/11/19    Patient: Parker Acevedo  YOB: 1970  Medical Record Number: [id number]                                                                  Opioid / Opioid Plus Controlled Substance Agreement    I understand that my care provider has prescribed an opioid (narcotic) controlled substance to help manage my condition(s). I am taking this medicine to help me function or work. I know this is strong medicine, and that it can cause serious side effects. Opioid medicine can be sedating, addicting and may cause a dependency on the drug. They can affect my ability to drive or think, and cause depression. They need to be taken exactly as prescribed. Combining opioids with certain medicines or chemicals (such as cocaine, sedatives and tranquilizers, sleeping pills, meth) can be dangerous or even fatal. Also, if I stop opioids suddenly, I may have severe withdrawal symptoms. Last, I understand that opioids do not work for all types of pain nor for all patients. If not helpful, I may be asked to stop them.      The risks, benefits, and side effects of these medicine(s) were explained to me. I agree that:    1. I will take part in other treatments as advised by my care team. This may be psychiatry or counseling, physical therapy, behavioral therapy, group treatment or a referral to a pain clinic. I will reduce or stop my medicine when my care team tells me to do so.  2. I will take my medicines as prescribed. I will not change the dose or schedule unless my care team tells me to. There will be no refills if I  run out early.   I may be contactedwithout warning and asked to complete a urine drug test or pill count at any time.   3. I will keep all my appointments, and understand this is part of the monitoring of opioids. My care team may require an office visit for EVERY opioid/controlled substance refill. If I miss appointments or don t follow instructions, my care  team may stop my medicine.  4. I will not ask other providers to prescribe controlled substances, and I will not accept controlled substances from other people. If I need another prescribed controlled substance for a new reason, I will tell my care team within 1 business day.  5. I will use one pharmacy to fill all of my controlled substance prescriptions, and it is up to me to make sure that I do not run out of my medicines on weekends or holidays. If my care team is willing to refill my opioid prescription without a visit, I must request refills only during office hours, refills may take up to 3 days to process, and it may take up to 5 to 7 days for my medicine to be mailed and ready at my pharmacy. Prescriptions will not be mailed anywhere except my pharmacy.        [id number]  Rev 12/18         Registration to scan to EHR                             Page 1 of 2               Controlled Substance Agreement Opioid        [facility]  09/11/19  Patient: Parker Acevedo  YOB: 1970  Medical Record Number: [id number]                                                                  6. I am responsible for my prescriptions. If the medicine/prescription is lost or stolen, it will not be replaced. I also agree not to share controlled substance medicines with anyone.  7. I agree to not use ANY illegal or recreational drugs. This includes marijuana, cocaine, bath salts or other drugs. I agree not to use alcohol unless my care team says I may.          I agree to give urine samples whenever asked. If I don t give a urine sample, the care team may stop my medicine.    8. If I enroll in the [place] Medical Marijuana program, I will tell my care team. I will also sign an agreement to share my medical records with my care team.   9. I will bring in my list of medicines (or my medicine bottles) each time I come to the clinic.   10. I will tell my care team right away if I become pregnant  or have a new medical problem treated outside of my regular clinic.  11. I understand that this medicine can affect my thinking and judgment. It may be unsafe for me to drive, use machinery and do dangerous tasks. I will not do any of these things until I know how the medicine affects me. If my dose changes, I will wait to see how it affects me. I will contact my care team if I have concerns about medicine side effects.    I understand that if I do not follow any of the conditions above, my prescriptions or treatment may be stopped.      I agree that my provider, clinic care team, and pharmacy may work with any city, state or federal law enforcement agency that investigates the misuse, sale, or other diversion of my controlled medicine. I will allow my provider to discuss my care with or share a copy of this agreement with any other treating provider, pharmacy or emergency room where I receive care. I agree to give up (waive) any right of privacy or confidentiality with respect to these consents.     I have read this agreement and have asked questions about anything I did not understand.      ________________________________________________________________________  Patient signature - Date/Time -  Parker Acevedo                                      ________________________________________________________________________  Witness signature                                                            ________________________________________________________________________  Provider signature - Patti Milligan MD      [id number]  Rev 12/18         Registration to scan to EHR                         Page 2 of 2                   Controlled Substance Agreement Opioid           Page 1 of 2  Opioid Pain Medicines (also known as Narcotics)  What You Need to Know    What are opioids?   Opioids are pain medicines that must be prescribed by a doctor.  They are also known as narcotics.    Examples are:     morphine (MS  Contin, Do)    oxycodone (Oxycontin)    oxycodone and acetaminophen (Percocet)    hydrocodone and acetaminophen (Vicodin, Norco)     fentanyl patch (Duragesic)     hydromorphone (Dilaudid)     methadone     What do opioids do well?   Opioids are best for short-term pain after a surgery or injury. They also work well for cancer pain. Unlike other pain medicines, they do not cause liver or kidney failure or ulcers. They may help some people with long-lasting (chronic) pain.     What do opioids NOT do well?   Opioids never get rid of pain entirely, and they do not work well for most patients with chronic pain. Opioids do not reduce swelling, one of the causes of pain. They also don t work well for nerve pain.                           For informational purposes only.  Not to replace the advice of your care provider.  Copyright [facility]. All right reserved. Luqit 193008-Cep 02/18.      Page 2 of 2    Risks and side effects   Talk to your doctor before you start or decide to keep taking one of these medicines. Side effects include:    Lowering your breathing rate enough to cause death    Overdose, including death, especially if taking higher than prescribed doses    Long-term opioid use    Worse depression symptoms; less pleasure in things you usually enjoy    Feeling tired or sluggish    Slower thoughts or cloudy thinking    Being more sensitive to pain over time; pain is harder to control    Trouble sleeping or restless sleep    Changes in hormone levels (for example, less testosterone)    Changes in sex drive or ability to have sex    Constipation    Unsafe driving    Itching and sweating    Feeling dizzy    Nausea, vomiting and dry mouth    What else should I know about opioids?  When someone takes opioids for too long or too often, they become dependent. This means that if you stop or reduce the medicine too quickly, you will have withdrawal symptoms.    Dependence is not the same as  addiction. Addiction is when people keep using a substance that harms their body, their mind or their relations with others. If you have a history of drug or alcohol abuse, taking opioids can cause a relapse.    Over time, opioids don t work as well. Most people will need higher and higher doses. The higher the dose, the more serious the side effects. We don t know the long-term effects of opioids.      Prescribed opioids aren't the best way to manage chronic pain    Other ways to manage pain include:      Ibuprofen or acetaminophen.  You should always try this first.      Treat health problems that may be causing pain.      acupuncture or massage, deep breathing, meditation, visual imagery, aromatherapy.      Use heat or ice at the pain site      Physical therapy and exercise      Stop smoking      See a counselor or therapist                                                  People who have used opioids for a long time may have a lower quality of life, worse depression, higher levels of pain and more visits to doctors.    Never share your opioids with others. Be sure to store opioids in a secure place, locked if possible.Young children can easily swallow them and overdose.     You can overdose on opioids.  Signs of overdose include decrease or loss of consciousness, slowed breathing, trouble waking and blue lips.  If someone is worried about overdose, they should call 911.    If you are at risk for overdose, you may get naloxone (Narcan, a medicine that reverses the effects of opioids.  If you overdose, a friend or family member can give you Narcan while waiting for the ambulance.  They need to know the signs of overdose and how to give Narcan.    While you're taking opioids:    Don't use alcohol or street drugs. Taking them together can cause death.    Don't take any of these medicines unless your doctor says its okay.  Taking these with opioids can cause death.    Benzodiazepines (such as lorazepam         or  diazepam)    Muscle relaxers (such as cyclobenzaprine)    sleeping pills    other opioids    Safe disposal of opioids  Find your area drug take-back program, your pharmacy mail-back program, buy a special disposal bag (such as Deterra) from your pharmacy or flush them down the toilet.  Use the guidelines at:  www.fda.gov/drugs/resourcesforyou

## 2019-09-11 NOTE — PATIENT INSTRUCTIONS
----------------------------------------------------------------  [facility] Number:  [phone]     Call with any questions about your care and for scheduling assistance.     Calls are returned Monday through Friday between 8 AM and 4:30 PM. We usually get back to you within 2 business days depending on the issue/request.    If we are prescribing your medications:    For opioid medication refills, call the clinic or send a SailPlay message 7 days in advance.  Please include:    Name of requested medication    Name of the pharmacy.    For non-opioid medications, call your pharmacy directly to request a refill. Please allow 3-4 days to be processed.     Per [place] State Law:    All controlled substance prescriptions must be filled within 30 days of being written.      For those controlled substances allowing refills, pickup must occur within 30 days of last fill.      We believe regular attendance is key to your success in our program!      Any time you are unable to keep your appointment we ask that you call us at least 24 hours in advance to cancel.This will allow us to offer the appointment time to another patient.     Multiple missed appointments may lead to dismissal from the clinic.

## 2019-09-12 ENCOUNTER — HOME INFUSION (PRE-WILLOW HOME INFUSION) (OUTPATIENT)
Dept: PHARMACY | Facility: CLINIC | Age: 49
End: 2019-09-12

## 2019-09-12 ENCOUNTER — PATIENT OUTREACH (OUTPATIENT)
Dept: CARE COORDINATION | Facility: CLINIC | Age: 49
End: 2019-09-12

## 2019-09-12 ENCOUNTER — MYC MEDICAL ADVICE (OUTPATIENT)
Dept: PALLIATIVE MEDICINE | Facility: CLINIC | Age: 49
End: 2019-09-12

## 2019-09-12 DIAGNOSIS — G89.29 CHRONIC ABDOMINAL PAIN: Primary | ICD-10-CM

## 2019-09-12 DIAGNOSIS — R10.9 CHRONIC ABDOMINAL PAIN: Primary | ICD-10-CM

## 2019-09-12 DIAGNOSIS — G89.4 CHRONIC PAIN SYNDROME: ICD-10-CM

## 2019-09-12 DIAGNOSIS — R29.3 POOR POSTURE: ICD-10-CM

## 2019-09-12 DIAGNOSIS — M54.2 CERVICALGIA: ICD-10-CM

## 2019-09-12 RX ORDER — OXYCODONE HCL 5 MG/5 ML
10 SOLUTION, ORAL ORAL DAILY PRN
Qty: 300 ML | Refills: 0 | Status: ON HOLD | OUTPATIENT
Start: 2019-09-12 | End: 2019-09-27

## 2019-09-12 NOTE — PROGRESS NOTES
[facility]    Date of visit: 9/12/2019     Chief complaint:    Chief Complaint   Patient presents with     Pain       Interval history:  Parker Acevedo was last seen by me on 6/12/2019     Recommendations/plan at the last visit included:  1. Physical therapy for neck and back pain   2. Refills provided today- allowed early refills for travel- he hasn't requested this before.  3. Advised more regular BMs and use of miralax- refill provided  4. We again discussed smoking cessation and effects on his overall health, including infections  5. Follow up in 3 months.    Since his last visit, Parker Acevedo reports:  -no significant changes.  -was hospitalized since last visit  -meds continue to work.  -he continues to wonder if fentanyl worked better for him.    Pain scores:  Average pain intensity  6/10    Current pain treatments:   Fentanyl 25mcg/hr patch q48 hours -  Previously was at 50mcg/hr  Lidocaine patches  Naloxone- has from previous hospitalization.    Previous medication treatments included:   Valium 5 mg/day, 1 tab in AM and PM-stopped in 11/2017  Tylenol #3   Vicodin   Tramadol   Diazepam- for sleep/anxiety  Gabapentin- bad headaches, acid reflux  Oxycodone max of 45mg/day.        Side Effects: no side effects    Medications:  Current Outpatient Medications   Medication Sig Dispense Refill     acetaminophen (TYLENOL) 32 mg/mL liquid Take 500 mg by mouth every 4 hours as needed for fever or mild pain       albuterol (PROAIR HFA/PROVENTIL HFA/VENTOLIN HFA) 108 (90 Base) MCG/ACT Inhaler Inhale 2 puffs into the lungs every 4 hours as needed for shortness of breath / dyspnea or wheezing 1 Inhaler 3     amphetamine-dextroamphetamine (ADDERALL) 20 MG tablet Take 1 tablet (20 mg) by mouth daily 30 tablet 0     blood glucose (NO BRAND SPECIFIED) lancets standard Use to test blood sugar 1 times daily or as directed. 100 each 1     blood glucose (NO BRAND SPECIFIED)  "test strip Use to test blood sugar 1 times daily or as directed. 100 strip 3     blood glucose monitoring (NO BRAND SPECIFIED) meter device kit Use to test blood sugar 1 times daily or as directed. 1 kit 0     CARAFATE 1 GM/10ML suspension   2     carvedilol (COREG) 6.25 MG tablet Take 6.25 mg by mouth 2 times daily (with meals)       cyanocobalamin (CYANOCOBALAMIN) 1000 MCG/ML injection Inject 1 mL (1,000 mcg) into the muscle every 30 days 1 mL 11     diphenhydrAMINE (BENADRYL) 12.5 MG/5ML solution Take 10 mLs (25 mg) by mouth 3 times daily as needed for itching (Prior to vanco administration) 480 mL 0     [place]NA[facility] PATIENT Contact [facility] for patient specific medication information at [phone] on admission and discharge from the hospital.  Phones are answered 24 hours a day 7 days a week 365 days a year.    Providers - Choose \"CONTINUE HOME MED (no script)\" at discharge if patient treatment with home infusion will continue.       fentaNYL (DURAGESIC) 25 mcg/hr 72 hr patch Place 1 patch onto the skin every 48 hours remove old patch.   May fill 9/3/19 and start 9/5/19. Stop oxycodone 15 patch 0     lidocaine (LIDODERM) 5 % Patch Place 1-2 patches onto the skin every 24 hours Wear for 12 hours, remove for 12 hours.  OK to cut to better fit to size. 60 patch 6     LORazepam (ATIVAN) 1 MG tablet 1-2 mg as needed for anxiety with TPN and medications 50 tablet 0     Needle, Disp, (BD DISP NEEDLES) 27G X 1/2\" MISC 1 Device every 30 days Use for cyanocobalamin injection once q 30 days. 3 each 4     ondansetron (ZOFRAN-ODT) 8 MG ODT tab DISSOLVE ONE TABLET ON TONGUE EVERY 8 HOURS AS NEEDED FOR NAUSEA 90 tablet 1     order for DME Equipment being ordered: Urine Drainage bag, leg. 15 Units 11     order for DME Equipment being ordered: Bilateral knee high chronic venous insufficiency stockings--  mild-moderate pressures. 4 each 5     oxyCODONE (ROXICODONE) 5 MG/5ML solution Take 10 mLs " (10 mg) by mouth daily as needed for pain . 30 day supply 300 mL 0     polyethylene glycol (MIRALAX/GLYCOLAX) powder Take 17 g (1 capful) by mouth daily 578 g 11     sodium chloride 0.9% infusion Inject 1,000-2,000 mLs into the vein as needed        sucralfate (CARAFATE) 1 GM tablet Take 1 tablet (1 g) by mouth 4 times daily 120 tablet 3     UNABLE TO FIND States takes TPN 2050 ml  Every 14 hours through PICC       vitamin D2 (ERGOCALCIFEROL) 62980 units (1250 mcg) capsule Take 1 capsule (50,000 Units) by mouth once a week 12 capsule 3     naloxone (NARCAN) 4 MG/0.1ML nasal spray Spray 4 mg into one nostril alternating nostrils once as needed every 2-3 minutes until assistance arrives         Medical History: any changes in medical history since they were last seen? pickline removed, azevedo placed    Review of Systems:  The 14 system ROS was reviewed from the intake questionnaire, and is positive for: diarrhea, SOB, n/v, diahhrea, joitn pain, arthritis  Any bowel or bladder problems:now prolonged time between BM- more regular BMs than previous visit  Mood: anxiety    Physical Exam:  Blood pressure (!) 138/93, pulse 81, weight 88.5 kg (195 lb).  General: awake, alert.  Tearful at times  Gait: Slow  MSK exam: forward flexed posture with sitting  No drainage from G tube       THE 4 A's OF OPIOID MAINTENANCE ANALGESIA     Analgesia: on fentanyl alone, not getting enough coverage    Activity: on disability    Adverse effects: none    Adherence to Rx protocol: good- [place] Prescription Monitoring Program checked 9/11/19 appropriate  Last UDS and opioid agreement - getting today    Assessment:   1. Chronic abdominal pain, with history of gastric bypass and later peptic ulcer   2. G tube placement- only used for venting  3. Myofascial abdominal pain, with significant guarding and poor posture  4. Opioid tolerance  5. Anxiety  6. Foot pain with tendonous injury- healed  7. Left arm weakness, consistent with radial nerve injury-  improving  8. Port placement- h/o recurrent infections, getting TPN    I had a very long conversation with Rose Canales, and their son.  While I understand that he has some significant health problems and pain, the overuse he had was significant and overly concerning for risk of overdose.  I don't feel at this point that he can manage his own medications, and I have concerns that he doesn't understand that even if meds are fully treating his symptoms, there is a limitation.    My hope was to try a buprenoprine product, and consider a transition to suboxone if a higher dose was needed.  Unfortunately, he has side effects including difficulty with chest discomfort and breathing while on medication.  I don't feel that he would well at all with no medication.  Therefore he is on fentanyl 25mcg/hr patch.  My hope was this would have less watching the clock and just providing a baseline coverage.  The medicine isn't working well.  He would like to add in oxycodone or change to only oxycodone, but I am not willing to go back to where were were in the past.    We had a very shanda and clear discussion that included:  1- his wife must manage his meds.   2- he can only get 10mg of oxycodone/day, and his wife has to dispense that.  3- any overuse will lead to me no longer being able to prescribe for him.    We reittereated this message while doing the opioid agreement today.  Parker appears to understand an in fact became tearful about the conversation today.    Plan:   1. Physical therapy- order previously placed.  Didn't discuss during appointment, but sent Mychart about sending in new referral to do this.  2. Medications  1. See above discussion  2. Continue fentanyl 25mcg/hr.  Allowing 10mg oxycodone/day.  3. Follow up in 2-3 months.  4. Parker to follow up with Primary Care provider regarding elevated blood pressure. This is only small elevation    Total time spent was 40 minutes, and more than 50% of face to face time was  spent in counseling and/or coordination of care regarding physical therapy, medications.     Jessica Milligan MD  [facility]

## 2019-09-12 NOTE — PROGRESS NOTES
Clinic Care Coordination Contact  [facility]UST/Voicemail       Clinical Data: Care Coordinator Outreach  Outreach attempted x 1.  Left message on patient's voicemail with call back information and requested return call.  Plan: Care Coordinator will try to reach patient again in approximately 10 business days.    Melissa Behl BSN, RN, PHN  Primary Care Clinical RN Care Coordinator  [facility]   [phone]

## 2019-09-12 NOTE — TELEPHONE ENCOUNTER
Parker,  Three visits ago we discussed doing physical therapy, and then we talked about it again last visit.  I would like to know if you will commit to doing physical therapy.  We need to incorporate back in other non-narcotic treatments.    Let me know, and I will put the order in again- it has .    Jessica Milligan MD  [facility]

## 2019-09-13 NOTE — TELEPHONE ENCOUNTER
From: Parker Acevdeo      Created: 9/12/2019 7:04 PM        Yes that would be very helpful thank you so much will do physical therapy at the [facility]

## 2019-09-13 NOTE — PROGRESS NOTES
This is a recent snapshot of the patient's [facility] medical record.  For current drug dose and complete information and questions, call [phone] or In Basket pool, fv home infusion ([id number])  CSN Number:  [id number]

## 2019-09-15 LAB — PAIN DRUG SCR UR W RPTD MEDS: NORMAL

## 2019-09-16 ENCOUNTER — MYC MEDICAL ADVICE (OUTPATIENT)
Dept: INTERNAL MEDICINE | Facility: CLINIC | Age: 49
End: 2019-09-16

## 2019-09-23 ENCOUNTER — PATIENT OUTREACH (OUTPATIENT)
Dept: CARE COORDINATION | Facility: CLINIC | Age: 49
End: 2019-09-23

## 2019-09-23 NOTE — PROGRESS NOTES
Clinic Care Coordination Contact  [facility]/Voicemail       Clinical Data: Care Coordinator Outreach  Outreach attempted x 2.  Phone was busy, unable to leave voicemail.  Plan: Care Coordinator will send unable to contact letter with care coordinator contact information via [employer]. Care Coordinator will try to reach patient again in 1 month.    Melissa Behl BSN, RN, PHN  Primary Care Clinical RN Care Coordinator  [facility]   [phone]

## 2019-09-23 NOTE — LETTER
[place] CARE COORDINATION  [address] [facility] [address]   September 23, 2019    Parker Acevedo  [address] [facility] [address]      Dear Parker,    I have been attempting to reach you since our last contact. I would like to continue to work with you and provide any additional support you may need on achieving your health care related goals. Please call me at [phone] to let me know if you would like to continue working together. I know that there are many things that can affect our ability to communicate and I hope we can continue to work together.    All of us at the [facility] are invested in your health and are here to assist you in meeting your goals.     Sincerely,    Melissa Behl BSN, RN, PHN  Primary Care Clinical RN Care Coordinator  [facility]   [phone]

## 2019-09-26 ENCOUNTER — MYC REFILL (OUTPATIENT)
Dept: FAMILY MEDICINE | Facility: CLINIC | Age: 49
End: 2019-09-26

## 2019-09-26 ENCOUNTER — NURSE TRIAGE (OUTPATIENT)
Dept: NURSING | Facility: CLINIC | Age: 49
End: 2019-09-26

## 2019-09-26 ENCOUNTER — MYC MEDICAL ADVICE (OUTPATIENT)
Dept: INTERNAL MEDICINE | Facility: CLINIC | Age: 49
End: 2019-09-26

## 2019-09-26 ENCOUNTER — HOME INFUSION (PRE-WILLOW HOME INFUSION) (OUTPATIENT)
Dept: PHARMACY | Facility: CLINIC | Age: 49
End: 2019-09-26

## 2019-09-26 ENCOUNTER — MYC REFILL (OUTPATIENT)
Dept: PALLIATIVE MEDICINE | Facility: CLINIC | Age: 49
End: 2019-09-26

## 2019-09-26 DIAGNOSIS — R10.9 CHRONIC ABDOMINAL PAIN: ICD-10-CM

## 2019-09-26 DIAGNOSIS — E53.8 VITAMIN B12 DEFICIENCY (NON ANEMIC): ICD-10-CM

## 2019-09-26 DIAGNOSIS — Z98.84 BARIATRIC SURGERY STATUS: ICD-10-CM

## 2019-09-26 DIAGNOSIS — G89.29 CHRONIC ABDOMINAL PAIN: ICD-10-CM

## 2019-09-26 DIAGNOSIS — R11.0 NAUSEA: ICD-10-CM

## 2019-09-26 DIAGNOSIS — R05.8 PRODUCTIVE COUGH: ICD-10-CM

## 2019-09-26 DIAGNOSIS — Z79.891 ENCOUNTER FOR LONG-TERM OPIATE ANALGESIC USE: ICD-10-CM

## 2019-09-26 DIAGNOSIS — T82.9XXA COMPLICATION ASSOCIATED WITH PERIPHERALLY INSERTED CENTRAL CATHETER, INITIAL ENCOUNTER: Primary | ICD-10-CM

## 2019-09-26 DIAGNOSIS — E43 SEVERE MALNUTRITION (H): ICD-10-CM

## 2019-09-26 DIAGNOSIS — G89.4 CHRONIC PAIN SYNDROME: ICD-10-CM

## 2019-09-26 RX ORDER — ONDANSETRON 8 MG/1
TABLET, ORALLY DISINTEGRATING ORAL
Qty: 90 TABLET | Refills: 1 | Status: CANCELLED | OUTPATIENT
Start: 2019-09-26

## 2019-09-26 RX ORDER — OXYCODONE HCL 5 MG/5 ML
10 SOLUTION, ORAL ORAL DAILY PRN
Qty: 300 ML | Refills: 0 | Status: CANCELLED | OUTPATIENT
Start: 2019-09-26

## 2019-09-26 RX ORDER — ALBUTEROL SULFATE 90 UG/1
2 AEROSOL, METERED RESPIRATORY (INHALATION) EVERY 4 HOURS PRN
Qty: 1 INHALER | Refills: 1 | Status: SHIPPED | OUTPATIENT
Start: 2019-09-26 | End: 2020-12-08

## 2019-09-26 RX ORDER — CYANOCOBALAMIN 1000 UG/ML
1 INJECTION, SOLUTION INTRAMUSCULAR; SUBCUTANEOUS
Qty: 1 ML | Refills: 11 | Status: CANCELLED | OUTPATIENT
Start: 2019-09-26

## 2019-09-26 RX ORDER — FENTANYL 25 UG/1
1 PATCH TRANSDERMAL
Qty: 15 PATCH | Refills: 0 | Status: CANCELLED | OUTPATIENT
Start: 2019-09-26

## 2019-09-26 NOTE — TELEPHONE ENCOUNTER
Patient requesting refill(s) of fentaNYL (DURAGESIC) 25 mcg/hr 72 hr patch  Last picked up from pharmacy on 9/13/19    oxyCODONE (ROXICODONE) 5 MG/5ML solution  Last picked up from pharmacy on 9/12/19    Pt last seen by prescribing provider on 9/11/19  Next appt scheduled for No future appointment      checked in the past 6 months? Yes If no, print current report and give to RN    Last urine drug screen date 9/11/19  Current opioid agreement on file (completed within the last year) Yes Date of opioid agreement: 9/11/19    Processing (pick one and delete the others):      E-prescribe to [facility] - [place], [place]  pharmacy    Will route to nursing pool for review and preparation of prescription(s).

## 2019-09-26 NOTE — TELEPHONE ENCOUNTER
"S: Calling about a fever at 1720.  B: Has a Cm cath in place for administration of TPN/lipds for malnutrition post gastric bypass. Has a fever of 101.4 taken orally.  Per wife in the past when he has a fever home care come out to do blood cultures.  Uses [facility] ([phone]) for TPN/lipids and [facility] for nursing (P) [phone] (F) [phone].  See PMH below.    1. Chronic abdominal pain, with history of gastric bypass and later peptic ulcer /malnutrition  2. G tube placement- only used for venting  3. Myofascial abdominal pain, with significant guarding and poor posture  4. Opioid tolerance  5. Anxiety  6. Foot pain with tendonous injury- healed  7. Left arm weakness, consistent with radial nerve injury- improving  8. Cm placement- h/o recurrent infections, getting TPN  9. Cardiomyopathy  10. ADHD    A: Will page on call for [facility] Dr. Charles at 1745.    R: Per Dr. Charles to draw blood cultures. If home care can't get to home in a timely manner have patient go to [facility] ED.  Patient needs to be febrile in order to draw blood cultures.   Draw one culture from Cm and one Peripherally.     Writer spoke with on call [facility] nurse Anastasiia Murphy.  Per Anastasiiamony Canales's wife had called them at 1700 and was told to go [facility] ED.      Per Anastasiia  there is no way to get to the patients home in a timely manner this evening.  [facility] is based out of [place] and Parker lives in [place].  [facility] services provides the blood culture bottles and [facility] would provide the nurse.    Writer spoke with Parker and he does \"not feel well enough to go to the hospital.\"  Writer explained the reason why. Parker politely said thank you and hung up the phone.    Writer will place order in [facility] and fax over to [facility] and they can call the patient in the morning.  Anasatsiia HARRISON from [facility] [place] care will up date [facility].    Henny " Omaira RN, [facility]    Reason for Disposition    [1] Fever > 100.0 F (37.8 C) AND [2] has port (portacath), central line, or PICC line    Additional Information    Negative: Shock suspected (e.g., cold/pale/clammy skin, too weak to stand, low BP, rapid pulse)    Negative: Difficult to awaken or acting confused (e.g., disoriented, slurred speech)    Negative: [1] Difficulty breathing AND [2] bluish lips, tongue or face    Negative: New onset rash with multiple purple (or blood-colored) spots or dots    Negative: Sounds like a life-threatening emergency to the triager    Negative: Difficulty breathing    Negative: IV drug abuse    Negative: [1] Headache AND [2] stiff neck (can't touch chin to chest)    Negative: [1] Drinking very little AND [2] dehydration suspected (e.g., no urine > 12 hours, very dry mouth, very lightheaded)    Negative: Patient sounds very sick or weak to the triager  (Exception: mild weakness and hasn't taken fever medicine)    Negative: Fever > 104 F (40 C)    Negative: [1] Fever > 101 F (38.3 C) AND [2] age > 60    Negative: [1] Fever > 100.0 F (37.8 C) AND [2] bedridden (e.g., nursing home patient, CVA, chronic illness, recovering from surgery)    Negative: [1] Fever > 100.0 F (37.8 C) AND [2] indwelling urinary catheter (e.g., Sanchez, Coude)    Protocols used: FEVER-A-AH

## 2019-09-26 NOTE — TELEPHONE ENCOUNTER
Prescription approved per [facility] Refill Protocol.  Miguel Thompson, RN, BSN

## 2019-09-27 ENCOUNTER — HOME INFUSION (PRE-WILLOW HOME INFUSION) (OUTPATIENT)
Dept: PHARMACY | Facility: CLINIC | Age: 49
End: 2019-09-27

## 2019-09-27 PROCEDURE — 87077 CULTURE AEROBIC IDENTIFY: CPT | Performed by: SURGERY

## 2019-09-27 PROCEDURE — 87186 SC STD MICRODIL/AGAR DIL: CPT | Performed by: SURGERY

## 2019-09-27 PROCEDURE — 87800 DETECT AGNT MULT DNA DIREC: CPT | Performed by: SURGERY

## 2019-09-27 PROCEDURE — 87040 BLOOD CULTURE FOR BACTERIA: CPT | Performed by: SURGERY

## 2019-09-27 RX ORDER — ONDANSETRON 8 MG/1
TABLET, ORALLY DISINTEGRATING ORAL
Qty: 90 TABLET | Refills: 1 | Status: SHIPPED | OUTPATIENT
Start: 2019-09-27 | End: 2019-10-29

## 2019-09-27 RX ORDER — CYANOCOBALAMIN 1000 UG/ML
1 INJECTION, SOLUTION INTRAMUSCULAR; SUBCUTANEOUS
Qty: 1 ML | Refills: 11 | OUTPATIENT
Start: 2019-09-27

## 2019-09-27 NOTE — TELEPHONE ENCOUNTER
Medication refill information reviewed.     Due date for    fentaNYL (DURAGESIC) 25 mcg/hr 72 hr patch is 10/5/19    oxyCODONE (ROXICODONE) 5 MG/5ML solution is 10/12/19 (patient reported 145 mLs left on end of day 9/26/19. Would expect 150 mLs)    Prescriptions prepped for review.     Will route to provider.

## 2019-09-27 NOTE — PROGRESS NOTES
This is a recent snapshot of the patient's [facility] medical record.  For current drug dose and complete information and questions, call [phone] or In [facility] pool, fv home infusion ([id number])  CSN Number:  [id number]

## 2019-09-29 ENCOUNTER — APPOINTMENT (OUTPATIENT)
Dept: GENERAL RADIOLOGY | Facility: CLINIC | Age: 49
DRG: 315 | End: 2019-09-29
Attending: EMERGENCY MEDICINE
Payer: COMMERCIAL

## 2019-09-29 ENCOUNTER — HOSPITAL ENCOUNTER (INPATIENT)
Facility: CLINIC | Age: 49
LOS: 5 days | Discharge: HOME | DRG: 315 | End: 2019-10-04
Attending: EMERGENCY MEDICINE | Admitting: INTERNAL MEDICINE
Payer: COMMERCIAL

## 2019-09-29 ENCOUNTER — HOME INFUSION (PRE-WILLOW HOME INFUSION) (OUTPATIENT)
Dept: PHARMACY | Facility: CLINIC | Age: 49
End: 2019-09-29

## 2019-09-29 DIAGNOSIS — K27.9 PEPTIC ULCER DISEASE: ICD-10-CM

## 2019-09-29 DIAGNOSIS — R50.9 FEVER, UNSPECIFIED FEVER CAUSE: ICD-10-CM

## 2019-09-29 DIAGNOSIS — Z78.9 ON TOTAL PARENTERAL NUTRITION: ICD-10-CM

## 2019-09-29 DIAGNOSIS — R10.9 CHRONIC ABDOMINAL PAIN: Primary | ICD-10-CM

## 2019-09-29 DIAGNOSIS — M79.89 SWELLING OF LOWER EXTREMITY: ICD-10-CM

## 2019-09-29 DIAGNOSIS — R78.81 POSITIVE BLOOD CULTURE: ICD-10-CM

## 2019-09-29 DIAGNOSIS — G89.29 CHRONIC ABDOMINAL PAIN: Primary | ICD-10-CM

## 2019-09-29 DIAGNOSIS — R78.81 GRAM-POSITIVE BACTEREMIA: ICD-10-CM

## 2019-09-29 DIAGNOSIS — Z98.84 GASTRIC BYPASS STATUS FOR OBESITY: ICD-10-CM

## 2019-09-29 LAB
ALBUMIN SERPL-MCNC: 3.6 G/DL (ref 3.4–5)
ALBUMIN UR-MCNC: NEGATIVE MG/DL
ALP SERPL-CCNC: 96 U/L (ref 40–150)
ALT SERPL W P-5'-P-CCNC: 18 U/L (ref 0–70)
ANION GAP SERPL CALCULATED.3IONS-SCNC: 4 MMOL/L (ref 3–14)
APPEARANCE UR: CLEAR
AST SERPL W P-5'-P-CCNC: 8 U/L (ref 0–45)
BASOPHILS # BLD AUTO: 0 10E9/L (ref 0–0.2)
BASOPHILS NFR BLD AUTO: 0.4 %
BILIRUB SERPL-MCNC: 0.4 MG/DL (ref 0.2–1.3)
BILIRUB UR QL STRIP: NEGATIVE
BUN SERPL-MCNC: 10 MG/DL (ref 7–30)
CALCIUM SERPL-MCNC: 8.5 MG/DL (ref 8.5–10.1)
CHLORIDE SERPL-SCNC: 107 MMOL/L (ref 94–109)
CO2 SERPL-SCNC: 32 MMOL/L (ref 20–32)
COLOR UR AUTO: ABNORMAL
CREAT SERPL-MCNC: 0.78 MG/DL (ref 0.66–1.25)
DIFFERENTIAL METHOD BLD: NORMAL
EOSINOPHIL # BLD AUTO: 0.2 10E9/L (ref 0–0.7)
EOSINOPHIL NFR BLD AUTO: 2.2 %
ERYTHROCYTE [DISTWIDTH] IN BLOOD BY AUTOMATED COUNT: 13.8 % (ref 10–15)
GFR SERPL CREATININE-BSD FRML MDRD: >90 ML/MIN/{1.73_M2}
GLUCOSE SERPL-MCNC: 89 MG/DL (ref 70–99)
GLUCOSE UR STRIP-MCNC: NEGATIVE MG/DL
HCT VFR BLD AUTO: 43.4 % (ref 40–53)
HGB BLD-MCNC: 13.8 G/DL (ref 13.3–17.7)
HGB UR QL STRIP: ABNORMAL
IMM GRANULOCYTES # BLD: 0 10E9/L (ref 0–0.4)
IMM GRANULOCYTES NFR BLD: 0.2 %
KETONES UR STRIP-MCNC: NEGATIVE MG/DL
LACTATE BLD-SCNC: 1 MMOL/L (ref 0.7–2)
LEUKOCYTE ESTERASE UR QL STRIP: NEGATIVE
LIPASE SERPL-CCNC: 58 U/L (ref 73–393)
LYMPHOCYTES # BLD AUTO: 1.9 10E9/L (ref 0.8–5.3)
LYMPHOCYTES NFR BLD AUTO: 23.5 %
MCH RBC QN AUTO: 30.1 PG (ref 26.5–33)
MCHC RBC AUTO-ENTMCNC: 31.8 G/DL (ref 31.5–36.5)
MCV RBC AUTO: 95 FL (ref 78–100)
MONOCYTES # BLD AUTO: 1 10E9/L (ref 0–1.3)
MONOCYTES NFR BLD AUTO: 12.2 %
NEUTROPHILS # BLD AUTO: 5.1 10E9/L (ref 1.6–8.3)
NEUTROPHILS NFR BLD AUTO: 61.5 %
NITRATE UR QL: NEGATIVE
NRBC # BLD AUTO: 0 10*3/UL
NRBC BLD AUTO-RTO: 0 /100
PH UR STRIP: 7.5 PH (ref 5–7)
PLATELET # BLD AUTO: 169 10E9/L (ref 150–450)
POTASSIUM SERPL-SCNC: 3.8 MMOL/L (ref 3.4–5.3)
PROT SERPL-MCNC: 7.2 G/DL (ref 6.8–8.8)
RBC # BLD AUTO: 4.59 10E12/L (ref 4.4–5.9)
RBC #/AREA URNS AUTO: <1 /HPF (ref 0–2)
SODIUM SERPL-SCNC: 143 MMOL/L (ref 133–144)
SOURCE: ABNORMAL
SP GR UR STRIP: 1 (ref 1–1.03)
UROBILINOGEN UR STRIP-MCNC: NORMAL MG/DL (ref 0–2)
WBC # BLD AUTO: 8.2 10E9/L (ref 4–11)
WBC #/AREA URNS AUTO: 0 /HPF (ref 0–5)

## 2019-09-29 PROCEDURE — 80053 COMPREHEN METABOLIC PANEL: CPT | Performed by: EMERGENCY MEDICINE

## 2019-09-29 PROCEDURE — 81001 URINALYSIS AUTO W/SCOPE: CPT | Performed by: EMERGENCY MEDICINE

## 2019-09-29 PROCEDURE — 85025 COMPLETE CBC W/AUTO DIFF WBC: CPT | Performed by: EMERGENCY MEDICINE

## 2019-09-29 PROCEDURE — 87040 BLOOD CULTURE FOR BACTERIA: CPT | Performed by: EMERGENCY MEDICINE

## 2019-09-29 PROCEDURE — 99223 1ST HOSP IP/OBS HIGH 75: CPT | Mod: AI | Performed by: INTERNAL MEDICINE

## 2019-09-29 PROCEDURE — 96365 THER/PROPH/DIAG IV INF INIT: CPT | Performed by: EMERGENCY MEDICINE

## 2019-09-29 PROCEDURE — 12000001 ZZH R&B MED SURG/OB UMMC

## 2019-09-29 PROCEDURE — 83605 ASSAY OF LACTIC ACID: CPT | Performed by: EMERGENCY MEDICINE

## 2019-09-29 PROCEDURE — 71046 X-RAY EXAM CHEST 2 VIEWS: CPT

## 2019-09-29 PROCEDURE — 96366 THER/PROPH/DIAG IV INF ADDON: CPT | Performed by: EMERGENCY MEDICINE

## 2019-09-29 PROCEDURE — 99285 EMERGENCY DEPT VISIT HI MDM: CPT | Mod: 25 | Performed by: EMERGENCY MEDICINE

## 2019-09-29 PROCEDURE — 96367 TX/PROPH/DG ADDL SEQ IV INF: CPT | Performed by: EMERGENCY MEDICINE

## 2019-09-29 PROCEDURE — 25000132 ZZH RX MED GY IP 250 OP 250 PS 637: Performed by: STUDENT IN AN ORGANIZED HEALTH CARE EDUCATION/TRAINING PROGRAM

## 2019-09-29 PROCEDURE — 99285 EMERGENCY DEPT VISIT HI MDM: CPT | Mod: Z6 | Performed by: EMERGENCY MEDICINE

## 2019-09-29 PROCEDURE — 96375 TX/PRO/DX INJ NEW DRUG ADDON: CPT | Performed by: EMERGENCY MEDICINE

## 2019-09-29 PROCEDURE — 25800030 ZZH RX IP 258 OP 636: Performed by: EMERGENCY MEDICINE

## 2019-09-29 PROCEDURE — 83690 ASSAY OF LIPASE: CPT | Performed by: EMERGENCY MEDICINE

## 2019-09-29 PROCEDURE — 25000128 H RX IP 250 OP 636: Performed by: EMERGENCY MEDICINE

## 2019-09-29 RX ORDER — LIDOCAINE 50 MG/G
1-2 PATCH TOPICAL EVERY 24 HOURS
Status: DISCONTINUED | OUTPATIENT
Start: 2019-09-29 | End: 2019-09-29

## 2019-09-29 RX ORDER — SODIUM CHLORIDE, SODIUM LACTATE, POTASSIUM CHLORIDE, CALCIUM CHLORIDE 600; 310; 30; 20 MG/100ML; MG/100ML; MG/100ML; MG/100ML
INJECTION, SOLUTION INTRAVENOUS CONTINUOUS
Status: DISPENSED | OUTPATIENT
Start: 2019-09-29 | End: 2019-09-30

## 2019-09-29 RX ORDER — SUCRALFATE ORAL 1 G/10ML
1 SUSPENSION ORAL
Status: DISCONTINUED | OUTPATIENT
Start: 2019-09-29 | End: 2019-10-04 | Stop reason: HOSPADM

## 2019-09-29 RX ORDER — ONDANSETRON 2 MG/ML
4 INJECTION INTRAMUSCULAR; INTRAVENOUS ONCE
Status: COMPLETED | OUTPATIENT
Start: 2019-09-29 | End: 2019-09-29

## 2019-09-29 RX ORDER — DIPHENHYDRAMINE HYDROCHLORIDE 50 MG/ML
50 INJECTION INTRAMUSCULAR; INTRAVENOUS ONCE
Status: COMPLETED | OUTPATIENT
Start: 2019-09-29 | End: 2019-09-29

## 2019-09-29 RX ORDER — POLYETHYLENE GLYCOL 3350 17 G/17G
17 POWDER, FOR SOLUTION ORAL DAILY
Status: DISCONTINUED | OUTPATIENT
Start: 2019-09-30 | End: 2019-10-04 | Stop reason: HOSPADM

## 2019-09-29 RX ORDER — DEXTROAMPHETAMINE SACCHARATE, AMPHETAMINE ASPARTATE, DEXTROAMPHETAMINE SULFATE AND AMPHETAMINE SULFATE 2.5; 2.5; 2.5; 2.5 MG/1; MG/1; MG/1; MG/1
20 TABLET ORAL DAILY
Status: DISCONTINUED | OUTPATIENT
Start: 2019-09-30 | End: 2019-10-04 | Stop reason: HOSPADM

## 2019-09-29 RX ORDER — LIDOCAINE 4 G/G
1-2 PATCH TOPICAL
Status: DISCONTINUED | OUTPATIENT
Start: 2019-09-30 | End: 2019-10-04 | Stop reason: HOSPADM

## 2019-09-29 RX ORDER — FENTANYL 25 UG/1
25 PATCH TRANSDERMAL
Status: DISCONTINUED | OUTPATIENT
Start: 2019-09-29 | End: 2019-10-04 | Stop reason: HOSPADM

## 2019-09-29 RX ORDER — SUCRALFATE 1 G/1
1 TABLET ORAL 4 TIMES DAILY
Status: DISCONTINUED | OUTPATIENT
Start: 2019-09-29 | End: 2019-09-29

## 2019-09-29 RX ORDER — LIDOCAINE 40 MG/G
CREAM TOPICAL
Status: DISCONTINUED | OUTPATIENT
Start: 2019-09-29 | End: 2019-10-04 | Stop reason: HOSPADM

## 2019-09-29 RX ORDER — HYDROMORPHONE HYDROCHLORIDE 1 MG/ML
0.5 INJECTION, SOLUTION INTRAMUSCULAR; INTRAVENOUS; SUBCUTANEOUS ONCE
Status: COMPLETED | OUTPATIENT
Start: 2019-09-29 | End: 2019-09-29

## 2019-09-29 RX ORDER — LORAZEPAM 1 MG/1
1-2 TABLET ORAL DAILY
Status: DISCONTINUED | OUTPATIENT
Start: 2019-09-30 | End: 2019-09-29

## 2019-09-29 RX ORDER — OXYCODONE HCL 5 MG/5 ML
10 SOLUTION, ORAL ORAL DAILY PRN
Status: DISCONTINUED | OUTPATIENT
Start: 2019-09-29 | End: 2019-09-30

## 2019-09-29 RX ORDER — CEFTRIAXONE 2 G/1
2 INJECTION, POWDER, FOR SOLUTION INTRAMUSCULAR; INTRAVENOUS EVERY 24 HOURS
Status: DISCONTINUED | OUTPATIENT
Start: 2019-09-30 | End: 2019-10-01

## 2019-09-29 RX ORDER — NALOXONE HYDROCHLORIDE 0.4 MG/ML
.1-.4 INJECTION, SOLUTION INTRAMUSCULAR; INTRAVENOUS; SUBCUTANEOUS
Status: DISCONTINUED | OUTPATIENT
Start: 2019-09-29 | End: 2019-10-04 | Stop reason: HOSPADM

## 2019-09-29 RX ORDER — CEFTRIAXONE 1 G/1
1 INJECTION, POWDER, FOR SOLUTION INTRAMUSCULAR; INTRAVENOUS ONCE
Status: COMPLETED | OUTPATIENT
Start: 2019-09-29 | End: 2019-09-29

## 2019-09-29 RX ORDER — ALBUTEROL SULFATE 90 UG/1
2 AEROSOL, METERED RESPIRATORY (INHALATION) EVERY 4 HOURS PRN
Status: DISCONTINUED | OUTPATIENT
Start: 2019-09-29 | End: 2019-10-04 | Stop reason: HOSPADM

## 2019-09-29 RX ORDER — ONDANSETRON 4 MG/1
4 TABLET, ORALLY DISINTEGRATING ORAL EVERY 6 HOURS PRN
Status: DISCONTINUED | OUTPATIENT
Start: 2019-09-29 | End: 2019-10-03

## 2019-09-29 RX ORDER — DIPHENHYDRAMINE HCL 12.5MG/5ML
25 LIQUID (ML) ORAL
Status: DISCONTINUED | OUTPATIENT
Start: 2019-09-30 | End: 2019-10-02

## 2019-09-29 RX ORDER — LORAZEPAM 1 MG/1
1-2 TABLET ORAL DAILY PRN
Status: DISCONTINUED | OUTPATIENT
Start: 2019-09-29 | End: 2019-10-04 | Stop reason: HOSPADM

## 2019-09-29 RX ADMIN — CEFTRIAXONE 1 G: 1 INJECTION, POWDER, FOR SOLUTION INTRAMUSCULAR; INTRAVENOUS at 16:22

## 2019-09-29 RX ADMIN — HYDROMORPHONE HYDROCHLORIDE 0.5 MG: 1 INJECTION, SOLUTION INTRAMUSCULAR; INTRAVENOUS; SUBCUTANEOUS at 17:30

## 2019-09-29 RX ADMIN — DIPHENHYDRAMINE HYDROCHLORIDE 50 MG: 50 INJECTION, SOLUTION INTRAMUSCULAR; INTRAVENOUS at 18:16

## 2019-09-29 RX ADMIN — ONDANSETRON 4 MG: 2 INJECTION INTRAMUSCULAR; INTRAVENOUS at 17:28

## 2019-09-29 RX ADMIN — SODIUM CHLORIDE 1000 ML: 9 INJECTION, SOLUTION INTRAVENOUS at 16:22

## 2019-09-29 RX ADMIN — VANCOMYCIN HYDROCHLORIDE 2250 MG: 1 INJECTION, POWDER, LYOPHILIZED, FOR SOLUTION INTRAVENOUS at 18:18

## 2019-09-29 RX ADMIN — FENTANYL 1 PATCH: 25 PATCH, EXTENDED RELEASE TRANSDERMAL at 22:23

## 2019-09-29 ASSESSMENT — ENCOUNTER SYMPTOMS
COUGH: 1
APPETITE CHANGE: 1
FATIGUE: 1
ARTHRALGIAS: 1
SHORTNESS OF BREATH: 1
SORE THROAT: 1
CHILLS: 1
NAUSEA: 1
VOMITING: 0
BACK PAIN: 1
DIAPHORESIS: 1

## 2019-09-29 ASSESSMENT — MIFFLIN-ST. JEOR
SCORE: 1769.41
SCORE: 1759.88

## 2019-09-29 NOTE — ED TRIAGE NOTES
Presents to the emergency department for evaluation of a positive blood culture. Patient had Hickmann placed 2 months ago. Patient states he gets intermittent fevers when he transfuses TPN through his Hickmann.

## 2019-09-29 NOTE — LETTER
Transition Communication Hand-off for Care Transitions to Next Level of Care Provider    Name: Parker Acevedo  : 1970  MRN #: [id number]  Primary Care Provider: Chuy Isaac     Primary Clinic: [address] [place] [id number]     Reason for Hospitalization:  Positive blood culture [R78.81]  On total parenteral nutrition [Z78.9]  Fever, unspecified fever cause [R50.9]  Admit Date/Time: 2019  3:31 PM  Discharge Date: 10/4/19  Payor Source: Payor: BCBS / Plan: BCBS MEDICARE ADVANTAGE / Product Type: Medicare /            Reason for Communication Hand-off Referral: Fragility  Other Continuity of care    Discharge Plan:       Concern for non-adherence with plan of care:   Yes  Discharge Needs Assessment:      Future Appointments   Date Time Provider Department Center   10/24/2019 11:15 AM Skinny Friend DPM [facility]   10/24/2019  3:00 PM Karine Up PT [facility]   10/25/2019 11:30 AM Chuy Isaac MD [facility]       Any outstanding tests or procedures:        Referrals     Future Labs/Procedures    Home infusion referral     Comments:    Your provider has referred you to: [facility]: [facility] [phone]   [email or web address]    [facility] coordinating home RN support through [facility]    Local Address (if different from home address): N/A    Anticipated Length of Therapy: Home TPN and course of IV vancomycin     Home Infusion Pharmacist to adjust therapy based on labs and clinical assessments: Yes    Labs:  May draw labs from Venous Catheter: Yes  Home Infusion Pharmacist to order labs based on therapy type and clinical assessments: Yes  Call/Fax Lab Results to: Primary care provider    Agency Staff to assess nursing needs for Infusion Therapy.    Access Device Management:  IV Access Type: PICC  Flush with Heparin and Normal Saline IVP PRN and routine site care  (per agency protocol) to maintain access device? Yes            Key Recommendations:  Patient will discharge home on BID IV Vanco with a stop date of 10/6.   Outpatient Lymphedema order placed.  Please refer to discharge orders and AVS for additional information.  Pt should arrange primary care f/u with 7 days of hospital discharge.     Jyothi Rivas RN    AVS/Discharge Summary is the source of truth; this is a helpful guide for improved communication of patient story

## 2019-09-29 NOTE — PHARMACY-VANCOMYCIN DOSING SERVICE
Pharmacy Vancomycin Initial Note  Date of Service 2019  Patient's  1970  48 year old, male    Indication: Bacteremia    Current estimated CrCl = Estimated Creatinine Clearance: 189.9 mL/min (A) (based on SCr of 0.59 mg/dL (L)).    Creatinine for last 3 days  No results found for requested labs within last 72 hours.    Recent Vancomycin Level(s) for last 3 days  No results found for requested labs within last 72 hours.      Vancomycin IV Administrations (past 72 hours)      No vancomycin orders with administrations in past 72 hours.                Nephrotoxins and other renal medications (From now, onward)    Start     Dose/Rate Route Frequency Ordered Stop    19 1630  vancomycin (VANCOCIN) 2,250 mg in sodium chloride 0.9 % 500 mL intermittent infusion      2,250 mg  over 2 Hours Intravenous ONCE 19 1611            Contrast Orders - past 72 hours (72h ago, onward)    None        Assessment:  Patient has previously been therapeutic on 1250mg IV every 8 hours. Recommend loading dose of 25mg/kg followed by 1250mg IV every 8 hours.        Plan:  1.  Start vancomycin  2250 mg IV once followed by 1250 mg IV q8h.   2.  Goal Trough Level: 15-20 mg/L   3.  Pharmacy will check trough levels as appropriate in 1-3 Days.    4. Serum creatinine levels will be ordered daily for the first week of therapy and at least twice weekly for subsequent weeks.    5. [place] method utilized to dose vancomycin therapy: Method 2    Yuli Rushing [facility]

## 2019-09-29 NOTE — ED PROVIDER NOTES
"    [place] EMERGENCY DEPARTMENT ([facility])  9/29/19   History     Chief Complaint   Patient presents with     Critical Values     The history is provided by the patient, the spouse and medical records.     Parker Acevedo is a 48 year old male who has a PMHx of cardiomyopathy, Celestino-en-Y gastric bypass complicated by marginal ulceration requiring multiple revisions, lap corey, gastrojejunostomy, gastrectomy, appendectomy, short gut syndrome s/p Cm placement h/o recurrent infections, chronic malnutrition necessitating TPN, who presents to the Emergency Department for evaluation of positive blood cultures and systemic symptoms. Patient and wife report that he has been feeling ill for multiple days. He had an oral temp of 101.4 at home on Friday evening (9/27) and so called [facility] nursing triage who sent out a home nurse to collect a blood culture. Patient states he was called today at 3 AM and was informed that preliminary result showed growth of multiple bacterial species and he was recommended to present to the ED. Per medical record, preliminary result showed growth of Gram positive cocci in clusters, pairs and chains on first day of growth and were positive for STAPHYLOCOCCUS EPIDERMIDIS and non-specific STREPTOCOCCUS.    Here in the ED, patient reports multiple systemic symptoms including: headaches, back cramping, malaise, chest pain near his right sided Cm, joint and leg pain. He admits to some diaphoresis, chills and continues to feel subjectively febrile. Does endorse nausea, shortness of breath but denies vomiting. Patient endorses a sore throat and a productive cough with green \"bilious\" sputum. Patient states he took Tylenol earlier without relief.     Patient's wife reports that he had a Gastric Bypass in 2003 with extensive complications and had revision in 2012. He endorses a lengthy abdominal surgery history. He is on TPN. Per wife he has been on antibiotics extensively in " the past and is often prescribed Vancomycin. Per review of the patient's medical record he is followed by Dr. Patti Milligan at [facility] who he last saw on 9/11/19 where they had an extensive discussion of opioid overuse and signed an opioid agreement for fentanyl 25mcg/hr patch + 10mg of oxycodone/day to be dispensed by his wife.    Social: Patient is accompanied by his wife who contributes to the medical history.    I have reviewed the Medications, Allergies, Past Medical and Surgical History, and Social History in the [facility] system.    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)      Chronic abdominal pain      Complication of anesthesia      Difficulty swallowing      Gastric ulcer, unspecified as acute or chronic, without mention of hemorrhage, perforation, or obstruction      Gastro-oesophageal reflux disease      Generalized weakness 1/30/2018     Head injury      Hiatal hernia      Other bladder disorder      Other chronic pain      PONV (postoperative nausea and vomiting)      Severe malnutrition (H)     TPN     Short gut syndrome      Tobacco abuse        Past Surgical History:   Procedure Laterality Date     AMPUTATION       APPENDECTOMY       BACK SURGERY  11/3/2014    curve in the spine     BIOPSY LYMPH NODE CERVICAL N/A 2/20/2015    Procedure: BIOPSY LYMPH NODE CERVICAL;  Surgeon: Baron Scanlon MD;  Location: [facility] OR     C GASTRIC BYPASS,OBESE<100CM SHAYLEE-EN-Y  2002    lost 300 pounds     CHOLECYSTECTOMY       DISCECTOMY, FUSION CERVICAL ANTERIOR ONE LEVEL, COMBINED N/A 2/15/2017    Procedure: COMBINED DISCECTOMY, FUSION CERVICAL ANTERIOR ONE LEVEL;  Surgeon: Darren Campos MD;  Location: [facility]     ENDOSCOPIC INSERTION TUBE GASTROSTOMY  9/9/2013    Procedure:  ENDOSCOPIC INSERTION TUBE GASTROSTOMY;;  Surgeon: Francis Vyas MD;  [facility]     ENDOSCOPIC ULTRASOUND UPPER GASTROINTESTINAL TRACT (GI)  4/29/2011    Procedure:ENDOSCOPIC ULTRASOUND UPPER GASTROINTESTINAL TRACT (GI); Both Procedures done Conjointly; Surgeon:NEREIDA HOUSER; Location:[facility]     ENDOSCOPIC ULTRASOUND UPPER GASTROINTESTINAL TRACT (GI)  9/9/2013    Procedure: ENDOSCOPIC ULTRASOUND UPPER GASTROINTESTINAL TRACT (GI);  Endoscopic Ultrasound Guide Gastrostomy Tube Placement  C-arm;  Surgeon: Noe Lizarraga MD;  [facility]     ENDOSCOPY  03/25/11    EGD, [facility]     ENDOSCOPY  08/04/09    Upper Endoscopy, [facility]     ENDOSCOPY  01/05/09    Upper Endoscopy, [facility]     ESOPHAGOSCOPY, GASTROSCOPY, DUODENOSCOPY (EGD), COMBINED  4/20/2011    Procedure:COMBINED ESOPHAGOSCOPY, GASTROSCOPY, DUODENOSCOPY (EGD); Surgeon:FRANCIS VYAS; [facility]     ESOPHAGOSCOPY, GASTROSCOPY, DUODENOSCOPY (EGD), COMBINED  6/15/2011    Procedure:COMBINED ESOPHAGOSCOPY, GASTROSCOPY, DUODENOSCOPY (EGD); Surgeon:FRANCIS VYAS; [facility]     ESOPHAGOSCOPY, GASTROSCOPY, DUODENOSCOPY (EGD), COMBINED  6/12/2013    Procedure: COMBINED ESOPHAGOSCOPY, GASTROSCOPY, DUODENOSCOPY (EGD);;  Surgeon: Francis Vyas MD;  [facility]     ESOPHAGOSCOPY, GASTROSCOPY, DUODENOSCOPY (EGD), COMBINED  11/22/2013    Procedure: COMBINED ESOPHAGOSCOPY, GASTROSCOPY, DUODENOSCOPY (EGD);;  Surgeon: Francis Vyas MD;  [facility]     ESOPHAGOSCOPY, GASTROSCOPY, DUODENOSCOPY (EGD), COMBINED  4/30/2014    Procedure: COMBINED ESOPHAGOSCOPY, GASTROSCOPY, DUODENOSCOPY (EGD);  Surgeon: Francis Vyas MD;  [facility]     ESOPHAGOSCOPY, GASTROSCOPY, DUODENOSCOPY (EGD), COMBINED N/A 2/20/2015    Procedure: COMBINED ESOPHAGOSCOPY, GASTROSCOPY, DUODENOSCOPY (EGD), BIOPSY SINGLE OR MULTIPLE;  Surgeon: Baron Scanlon MD;  Location: [facility]     ESOPHAGOSCOPY,  GASTROSCOPY, DUODENOSCOPY (EGD), COMBINED N/A 9/30/2015    Procedure: COMBINED ESOPHAGOSCOPY, GASTROSCOPY, DUODENOSCOPY (EGD);  Surgeon: Francis Vyas MD;  Location:  GI     GASTRECTOMY  6/22/2012    Procedure: GASTRECTOMY;  Open Approach, Excise Ulcers,Partial Gastrectomy, Esophagojejunostomy, Hiatal Hernia Repair, Extensive Lysis of Adhesions and Esaphagogastrodudenoscopy.;  Surgeon: Francis Vyas MD;  [facility]     GASTROJEJUNOSTOMY  08/26/09    Extensice enterolysis, partial resect. jejunum, part. resect gastric pouch, gastrojejunostomy anastomosis     HC ESOPH/GAS REFLUX TEST W NASAL IMPED ELECTRODE  8/5/2013    Procedure: ESOPHAGEAL IMPEDENCE FUNCTION TEST 1 HOUR OR LESS;  Surgeon: Halie Lang MD;  Location:  GI     HEAD & NECK SURGERY  2/15/2017    C5-C6     HERNIA REPAIR  2006    Umbilical hernia     HERNIORRHAPHY HIATAL  6/22/2012    Procedure: HERNIORRHAPHY HIATAL;;  Surgeon: Francis Vyas MD;  Location:  OR     HERNIORRHAPHY INGUINAL  11/22/2013    Procedure: HERNIORRHAPHY INGUINAL;;  Surgeon: Francis Vyas MD;  [facility]     INSERT PORT VASCULAR ACCESS Right 12/19/2017    Procedure: INSERT PORT VASCULAR ACCESS;  Right Chest Port Placement ;  Surgeon: Lisandro Alejandro PA-C;  [facility]     INSERT PORT VASCULAR ACCESS Right 8/2/2018    Procedure: INSERT PORT VASCULAR ACCESS;  Place single lumen tunneled central venous access catheter;  Surgeon: Guy Jamil PA-C;  [facility]     IR CVC TUNNEL PLACEMENT > 5 YRS OF AGE  8/7/2019     IR FOLLOW UP VISIT OUTPATIENT  8/7/2019     IR PICC PLACEMENT > 5 YRS OF AGE  3/7/2019     LAPAROTOMY EXPLORATORY  11/22/2013    Procedure: LAPAROTOMY EXPLORATORY;  Exploratory Laparotomy, Upper Endoscopy, Left Inguinal Hernia Repair;  Surgeon: Francis Vyas MD;  [facility]     ORTHOPEDIC SURGERY       PICC INSERTION Right 03/16/2017    5fr DL BioFlo PICC, 42cm (3cm external) in  the R medial brachial vein w/ tip in the SVC RA junction.     PICC INSERTION Left 2017    5fr DL BioFlo PICC, 45cm (1cm external) in the L basilic vein w/ tip in the SVC RA junction.     PICC INSERTION Right 2019    5Fr - 43cm, Medial brachial vein, low SVC     SHAYLEE EN Y BOWEL  2003     SOFT TISSUE SURGERY       SOFT TISSUE SURGERY       THORACIC SURGERY       TONSILLECTOMY       TRANSESOPHAGEAL ECHOCARDIOGRAM INTRAOPERATIVE N/A 2019    Procedure: TRANSESOPHAGEAL ECHOCARDIOGRAM INTRAOPERATIVE;  Surgeon: GENERIC ANESTHESIA PROVIDER;  [facility]       Family History   Problem Relation Age of Onset     Gastrointestinal Disease Mother         Crohns disease     Anxiety Disorder Mother      Thyroid Disease Mother         Grave's disease     Cancer Father         ear cancer-skin cancer/melanoma     Breast Cancer Maternal Grandmother      Macular Degeneration Maternal Grandfather      Anxiety Disorder Sister      Diabetes Maternal Uncle      Breast Cancer Other      Hypertension No family hx of      Hyperlipidemia No family hx of      Cerebrovascular Disease No family hx of      Prostate Cancer No family hx of      Depression No family hx of      Anesthesia Reaction No family hx of      Asthma No family hx of      Osteoporosis No family hx of      Genetic Disorder No family hx of      Obesity No family hx of      Mental Illness No family hx of      Substance Abuse No family hx of      Glaucoma No family hx of        Social History     Tobacco Use     Smoking status: Current Some Day Smoker     Packs/day: 0.25     Years: 3.00     Pack years: 0.75     Types: Cigarettes     Last attempt to quit: 2018     Years since quittin.1     Smokeless tobacco: Never Used     Tobacco comment: I use an e cig every now and than   Substance Use Topics     Alcohol use: No     Comment: quit        Current Facility-Administered Medications   Medication     diphenhydrAMINE (BENADRYL) injection 50 mg      "vancomycin (VANCOCIN) 2,250 mg in sodium chloride 0.9 % 500 mL intermittent infusion     [START ON 9/30/2019] vancomycin 1250 mg in 0.9% NaCl 250 mL intermittent infusion 1,250 mg     Current Outpatient Medications   Medication     carvedilol (COREG) 6.25 MG tablet     oxyCODONE (ROXICODONE) 5 MG/5ML solution     acetaminophen (TYLENOL) 32 mg/mL liquid     albuterol (PROAIR HFA/PROVENTIL HFA/VENTOLIN HFA) 108 (90 Base) MCG/ACT inhaler     amphetamine-dextroamphetamine (ADDERALL) 20 MG tablet     blood glucose (NO BRAND SPECIFIED) lancets standard     blood glucose (NO BRAND SPECIFIED) test strip     blood glucose monitoring (NO BRAND SPECIFIED) meter device kit     CARAFATE 1 GM/10ML suspension     cyanocobalamin (CYANOCOBALAMIN) 1000 MCG/ML injection     diphenhydrAMINE (BENADRYL) 12.5 MG/5ML solution     [employer] INFUSION MANAGED PATIENT     fentaNYL (DURAGESIC) 25 mcg/hr 72 hr patch     lidocaine (LIDODERM) 5 % Patch     LORazepam (ATIVAN) 1 MG tablet     naloxone (NARCAN) 4 MG/0.1ML nasal spray     Needle, Disp, (BD DISP NEEDLES) 27G X 1/2\" MISC     ondansetron (ZOFRAN-ODT) 8 MG ODT tab     order for DME     order for DME     polyethylene glycol (MIRALAX/GLYCOLAX) powder     sodium chloride 0.9% infusion     sucralfate (CARAFATE) 1 GM tablet     UNABLE TO FIND     vitamin D2 (ERGOCALCIFEROL) 48381 units (1250 mcg) capsule        Allergies   Allergen Reactions     Bactrim [Sulfamethoxazole W/Trimethoprim] Rash     Penicillins Anaphylaxis     Please see Antimicrobial Management Team allergy assessment note 10/10/2018. Patient reported tolerating amoxicillin.     Doxycycline Rash     Vancomycin Rash     Rash after receiving vancomycin 3/28/16 (red man's?). Tolerated with slower infusion and diphenhydramine premed.        Review of Systems   Constitutional: Positive for appetite change, chills, diaphoresis and fatigue. Fever: subjective.   HENT: Positive for sore throat.    Respiratory: Positive for cough " "(productive w/ green \"bilious\" sputum) and shortness of breath.    Gastrointestinal: Positive for nausea. Negative for vomiting.   Musculoskeletal: Positive for arthralgias and back pain (cramping).   All other systems reviewed and are negative.      Physical Exam   BP: 138/83  Heart Rate: 74  Temp: 98.4  F (36.9  C)  Resp: 16  Height: 180.3 cm (5' 11\")  Weight: 85.8 kg (189 lb 1.6 oz)  SpO2: 99 %      Physical Exam  Physical Exam   Constitutional:   Chronically ill-appearing, appears uncomfortable  HENT:   Head: Normocephalic and atraumatic.   Eyes: Conjunctivae are normal. Pupils are equal, round, and reactive to light.   pharynx has erythema with noexudate, mucous membranes are dry  Neck:   no adenopathy, no bony tenderness  Cardiovascular: regular rate and rhythm without murmurs or gallops  Pulmonary/Chest: Basilar crackles and diffuse inspiratory and expiratory crackles., with no retractions. No respiratory distress.  GI: Soft with good bowel sounds.  Non-tender, non-distended, with no guarding, no rebound, no peritoneal signs.   Back:  No bony or CVA tenderness   Musculoskeletal:  no edema    Skin: Skin is warm and dry. No rash noted.   Neurological: alert and oriented to person, place, and time. Nonfocal exam  Psychiatric:  normal mood and affect.   ED Course   3:41 PM  The patient was seen and examined by Moira Zamudio MD in Room ED30.       Procedures             Critical Care time:  none            Results for orders placed or performed during the hospital encounter of 09/29/19   XR Chest 2 Views    Narrative    XR CHEST 2 VW  9/29/2019 4:16 PM    History:  fever, TPN, + blood culture.     Comparison: Chest radiograph on 7/22/2019    Findings:   PA and the lateral view of the chest. Right central venous catheter  with the tip projects over cavoatrial junction. Cardiac silhouette is  within normal limits. Pulmonary vasculature is distinct. No acute  airspace opacities. No pneumothorax or significant pleural " effusion.  Chronic mild blunting of left costophrenic angle. No acute osseous  abnormality.      Impression    IMPRESSION:  No acute cardiac pulmonary process.    I have personally reviewed the examination and initial interpretation  and I agree with the findings.    TIANNA FARMER MD   CBC with platelets differential   Result Value Ref Range    WBC 8.2 4.0 - 11.0 10e9/L    RBC Count 4.59 4.4 - 5.9 10e12/L    Hemoglobin 13.8 13.3 - 17.7 g/dL    Hematocrit 43.4 40.0 - 53.0 %    MCV 95 78 - 100 fl    MCH 30.1 26.5 - 33.0 pg    MCHC 31.8 31.5 - 36.5 g/dL    RDW 13.8 10.0 - 15.0 %    Platelet Count 169 150 - 450 10e9/L    Diff Method Automated Method     % Neutrophils 61.5 %    % Lymphocytes 23.5 %    % Monocytes 12.2 %    % Eosinophils 2.2 %    % Basophils 0.4 %    % Immature Granulocytes 0.2 %    Nucleated RBCs 0 0 /100    Absolute Neutrophil 5.1 1.6 - 8.3 10e9/L    Absolute Lymphocytes 1.9 0.8 - 5.3 10e9/L    Absolute Monocytes 1.0 0.0 - 1.3 10e9/L    Absolute Eosinophils 0.2 0.0 - 0.7 10e9/L    Absolute Basophils 0.0 0.0 - 0.2 10e9/L    Abs Immature Granulocytes 0.0 0 - 0.4 10e9/L    Absolute Nucleated RBC 0.0    Comprehensive metabolic panel   Result Value Ref Range    Sodium 143 133 - 144 mmol/L    Potassium 3.8 3.4 - 5.3 mmol/L    Chloride 107 94 - 109 mmol/L    Carbon Dioxide 32 20 - 32 mmol/L    Anion Gap 4 3 - 14 mmol/L    Glucose 89 70 - 99 mg/dL    Urea Nitrogen 10 7 - 30 mg/dL    Creatinine 0.78 0.66 - 1.25 mg/dL    GFR Estimate >90 >60 mL/min/[1.73_m2]    GFR Estimate If Black >90 >60 mL/min/[1.73_m2]    Calcium 8.5 8.5 - 10.1 mg/dL    Bilirubin Total 0.4 0.2 - 1.3 mg/dL    Albumin 3.6 3.4 - 5.0 g/dL    Protein Total 7.2 6.8 - 8.8 g/dL    Alkaline Phosphatase 96 40 - 150 U/L    ALT 18 0 - 70 U/L    AST 8 0 - 45 U/L   Lipase   Result Value Ref Range    Lipase 58 (L) 73 - 393 U/L   Lactic acid whole blood   Result Value Ref Range    Lactic Acid 1.0 0.7 - 2.0 mmol/L   UA reflex to Microscopic and Culture    Result Value Ref Range    Color Urine Light Yellow     Appearance Urine Clear     Glucose Urine Negative NEG^Negative mg/dL    Bilirubin Urine Negative NEG^Negative    Ketones Urine Negative NEG^Negative mg/dL    Specific Gravity Urine 1.004 1.003 - 1.035    Blood Urine Trace (A) NEG^Negative    pH Urine 7.5 (H) 5.0 - 7.0 pH    Protein Albumin Urine Negative NEG^Negative mg/dL    Urobilinogen mg/dL Normal 0.0 - 2.0 mg/dL    Nitrite Urine Negative NEG^Negative    Leukocyte Esterase Urine Negative NEG^Negative    Source Midstream Urine     RBC Urine <1 0 - 2 /HPF    WBC Urine 0 0 - 5 /HPF   Blood culture   Result Value Ref Range    Specimen Description Blood Right Arm     Special Requests Aerobic and anaerobic bottles received     Culture Micro PENDING    Blood culture   Result Value Ref Range    Specimen Description Blood Cm     Special Requests Aerobic and anaerobic bottles received     Culture Micro PENDING      *Note: Due to a large number of results and/or encounters for the requested time period, some results have not been displayed. A complete set of results can be found in Results Review.        Labs Ordered and Resulted from Time of ED Arrival Up to the Time of Departure from the ED   LIPASE - Abnormal; Notable for the following components:       Result Value    Lipase 58 (*)     All other components within normal limits   UA MACROSCOPIC WITH REFLEX TO MICRO AND CULTURE - Abnormal; Notable for the following components:    Blood Urine Trace (*)     pH Urine 7.5 (*)     All other components within normal limits   CBC WITH PLATELETS DIFFERENTIAL   COMPREHENSIVE METABOLIC PANEL   LACTIC ACID WHOLE BLOOD   BLOOD CULTURE   BLOOD CULTURE            Assessments & Plan (with Medical Decision Making)       I have reviewed the nursing notes.  Emergency Department course:  The patient was seen and examined at 1538 pm.   Chart review shows:    Blood cultures from 9/27/2019 were:  POSITIVE for STREPTOCOCCUS SPECIES  "OTHER THAN pneumococcus, anginosus   group, S. pyogenes and S. agalactiae. Performed using Verigene   multiplex nucleic acid test. Final identification and antimicrobial   susceptibility testing will be verified by standard methods.     POSITIVE for STAPHYLOCOCCUS EPIDERMIDIS and POSITIVE for the mecA   gene (resistant to methicillin) by Verigene multiplex nucleic acid   test. Final identification and antimicrobial susceptibility testing   will be verified by standard methods.       I treated the patient with normal saline bolus IV.  Blood cultures x2 were ordered.  I spoke with Dr. Kayy Delgado of Infectious Disease regarding antibiotic treatment.  She requests that I treat the patient with ceftriaxone IV and vancomycin IV.  The patient has tolerated ceftriaxone in the past.  He has a history of \"red man\" syndrome with vancomycin.  He was pretreated with Benadryl in the past and I will pretreat him with Benadryl today.  I also treated him with Dilaudid and Zofran IV and normal saline bolus IV.     Laboratory studies show a lactate of 1.0.  CBC shows a WBC of 8.2 and is otherwise unremarkable.  Comprehensive metabolic panel is normal.  Lipase is low at 58.  UA is nitrite and LCE negative.  Chest x-ray shows no acute cardiopulmonary process.    The patient is a 48-year-old male who presents with positive blood cultures for both MRSA staph and streptococcus.  He has a Cm for TPN.  He has been treated with vancomycin and ceftriaxone IV.  He will be admitted to the medicine service for IV antibiotics and further evaluation. I spoke with Dr. Cortes of medicine regarding admission.     I have reviewed the findings, diagnosis, plan and need for follow up with the patient.    New Prescriptions    No medications on file       Final diagnoses:   Positive blood culture   Fever, unspecified fever cause   On total parenteral nutrition     ISid, am serving as a trained medical scribe to document services personally " performed by Moira Zamudio MD, based on the provider's statements to me.   I, Moira Zamudio MD, was physically present and have reviewed and verified the accuracy of this note documented by Sid Padilla.   This note was created in part by the use of Dragon voice recognition dictation system. Inadvertent grammatical errors and typographical errors may still exist.  Moira Zamudio MD    9/29/2019   [facility], [place], EMERGENCY DEPARTMENT     Moira Zamudio MD  09/29/19 [id number]

## 2019-09-30 ENCOUNTER — APPOINTMENT (OUTPATIENT)
Dept: CARDIOLOGY | Facility: CLINIC | Age: 49
DRG: 315 | End: 2019-09-30
Payer: COMMERCIAL

## 2019-09-30 ENCOUNTER — TELEPHONE (OUTPATIENT)
Dept: INTERNAL MEDICINE | Facility: CLINIC | Age: 49
End: 2019-09-30

## 2019-09-30 LAB
ANION GAP SERPL CALCULATED.3IONS-SCNC: 8 MMOL/L (ref 3–14)
BUN SERPL-MCNC: 10 MG/DL (ref 7–30)
CALCIUM SERPL-MCNC: 8.2 MG/DL (ref 8.5–10.1)
CHLORIDE SERPL-SCNC: 112 MMOL/L (ref 94–109)
CO2 SERPL-SCNC: 24 MMOL/L (ref 20–32)
CREAT SERPL-MCNC: 0.65 MG/DL (ref 0.66–1.25)
ERYTHROCYTE [DISTWIDTH] IN BLOOD BY AUTOMATED COUNT: 13.9 % (ref 10–15)
GFR SERPL CREATININE-BSD FRML MDRD: >90 ML/MIN/{1.73_M2}
GLUCOSE SERPL-MCNC: 95 MG/DL (ref 70–99)
HCT VFR BLD AUTO: 40.4 % (ref 40–53)
HGB BLD-MCNC: 12.5 G/DL (ref 13.3–17.7)
MAGNESIUM SERPL-MCNC: 2 MG/DL (ref 1.6–2.3)
MCH RBC QN AUTO: 29.8 PG (ref 26.5–33)
MCHC RBC AUTO-ENTMCNC: 30.9 G/DL (ref 31.5–36.5)
MCV RBC AUTO: 96 FL (ref 78–100)
PHOSPHATE SERPL-MCNC: 4.3 MG/DL (ref 2.5–4.5)
PLATELET # BLD AUTO: 142 10E9/L (ref 150–450)
POTASSIUM SERPL-SCNC: 3.7 MMOL/L (ref 3.4–5.3)
RBC # BLD AUTO: 4.19 10E12/L (ref 4.4–5.9)
SODIUM SERPL-SCNC: 144 MMOL/L (ref 133–144)
WBC # BLD AUTO: 7.9 10E9/L (ref 4–11)

## 2019-09-30 PROCEDURE — 25000132 ZZH RX MED GY IP 250 OP 250 PS 637: Performed by: STUDENT IN AN ORGANIZED HEALTH CARE EDUCATION/TRAINING PROGRAM

## 2019-09-30 PROCEDURE — 93308 TTE F-UP OR LMTD: CPT

## 2019-09-30 PROCEDURE — 40000556 ZZH STATISTIC PERIPHERAL IV START W US GUIDANCE

## 2019-09-30 PROCEDURE — 80048 BASIC METABOLIC PNL TOTAL CA: CPT | Performed by: STUDENT IN AN ORGANIZED HEALTH CARE EDUCATION/TRAINING PROGRAM

## 2019-09-30 PROCEDURE — 36415 COLL VENOUS BLD VENIPUNCTURE: CPT | Performed by: STUDENT IN AN ORGANIZED HEALTH CARE EDUCATION/TRAINING PROGRAM

## 2019-09-30 PROCEDURE — 12000001 ZZH R&B MED SURG/OB UMMC

## 2019-09-30 PROCEDURE — 25000125 ZZHC RX 250: Performed by: STUDENT IN AN ORGANIZED HEALTH CARE EDUCATION/TRAINING PROGRAM

## 2019-09-30 PROCEDURE — 83735 ASSAY OF MAGNESIUM: CPT | Performed by: STUDENT IN AN ORGANIZED HEALTH CARE EDUCATION/TRAINING PROGRAM

## 2019-09-30 PROCEDURE — 85027 COMPLETE CBC AUTOMATED: CPT | Performed by: STUDENT IN AN ORGANIZED HEALTH CARE EDUCATION/TRAINING PROGRAM

## 2019-09-30 PROCEDURE — 25800030 ZZH RX IP 258 OP 636: Performed by: STUDENT IN AN ORGANIZED HEALTH CARE EDUCATION/TRAINING PROGRAM

## 2019-09-30 PROCEDURE — 84100 ASSAY OF PHOSPHORUS: CPT | Performed by: STUDENT IN AN ORGANIZED HEALTH CARE EDUCATION/TRAINING PROGRAM

## 2019-09-30 PROCEDURE — 93321 DOPPLER ECHO F-UP/LMTD STD: CPT | Mod: 26 | Performed by: INTERNAL MEDICINE

## 2019-09-30 PROCEDURE — 25000128 H RX IP 250 OP 636: Performed by: PEDIATRICS

## 2019-09-30 PROCEDURE — 25800030 ZZH RX IP 258 OP 636: Performed by: PEDIATRICS

## 2019-09-30 PROCEDURE — 99232 SBSQ HOSP IP/OBS MODERATE 35: CPT | Mod: GC | Performed by: PEDIATRICS

## 2019-09-30 PROCEDURE — 25000128 H RX IP 250 OP 636: Performed by: STUDENT IN AN ORGANIZED HEALTH CARE EDUCATION/TRAINING PROGRAM

## 2019-09-30 PROCEDURE — 93325 DOPPLER ECHO COLOR FLOW MAPG: CPT | Mod: 26 | Performed by: INTERNAL MEDICINE

## 2019-09-30 PROCEDURE — 87040 BLOOD CULTURE FOR BACTERIA: CPT | Performed by: STUDENT IN AN ORGANIZED HEALTH CARE EDUCATION/TRAINING PROGRAM

## 2019-09-30 PROCEDURE — 25000131 ZZH RX MED GY IP 250 OP 636 PS 637: Performed by: STUDENT IN AN ORGANIZED HEALTH CARE EDUCATION/TRAINING PROGRAM

## 2019-09-30 PROCEDURE — 93308 TTE F-UP OR LMTD: CPT | Mod: 26 | Performed by: INTERNAL MEDICINE

## 2019-09-30 RX ORDER — CARVEDILOL 6.25 MG/1
6.25 TABLET ORAL 2 TIMES DAILY WITH MEALS
Status: DISCONTINUED | OUTPATIENT
Start: 2019-09-30 | End: 2019-10-04 | Stop reason: HOSPADM

## 2019-09-30 RX ORDER — ERGOCALCIFEROL 1.25 MG/1
50000 CAPSULE, LIQUID FILLED ORAL WEEKLY
Status: DISCONTINUED | OUTPATIENT
Start: 2019-09-30 | End: 2019-10-04 | Stop reason: HOSPADM

## 2019-09-30 RX ORDER — OXYCODONE HCL 5 MG/5 ML
10 SOLUTION, ORAL ORAL 3 TIMES DAILY PRN
Status: DISCONTINUED | OUTPATIENT
Start: 2019-09-30 | End: 2019-10-04 | Stop reason: HOSPADM

## 2019-09-30 RX ORDER — OXYCODONE HCL 5 MG/5 ML
10 SOLUTION, ORAL ORAL 2 TIMES DAILY PRN
Status: DISCONTINUED | OUTPATIENT
Start: 2019-09-30 | End: 2019-09-30

## 2019-09-30 RX ADMIN — SUCRALFATE 1 G: 1 SUSPENSION ORAL at 16:40

## 2019-09-30 RX ADMIN — CARVEDILOL 6.25 MG: 6.25 TABLET, FILM COATED ORAL at 11:22

## 2019-09-30 RX ADMIN — ONDANSETRON 4 MG: 4 TABLET, ORALLY DISINTEGRATING ORAL at 01:10

## 2019-09-30 RX ADMIN — DIPHENHYDRAMINE HYDROCHLORIDE 25 MG: 25 SOLUTION ORAL at 17:44

## 2019-09-30 RX ADMIN — SODIUM CHLORIDE: 9 INJECTION INTRAMUSCULAR; INTRAVENOUS; SUBCUTANEOUS at 22:12

## 2019-09-30 RX ADMIN — SODIUM CHLORIDE, POTASSIUM CHLORIDE, SODIUM LACTATE AND CALCIUM CHLORIDE: 600; 310; 30; 20 INJECTION, SOLUTION INTRAVENOUS at 00:13

## 2019-09-30 RX ADMIN — ERGOCALCIFEROL 50000 UNITS: 1.25 CAPSULE, LIQUID FILLED ORAL at 11:22

## 2019-09-30 RX ADMIN — DEXTROAMPHETAMINE SACCHARATE, AMPHETAMINE ASPARTATE, DEXTROAMPHETAMINE SULFATE AND AMPHETAMINE SULFATE 20 MG: 2.5; 2.5; 2.5; 2.5 TABLET ORAL at 08:33

## 2019-09-30 RX ADMIN — VANCOMYCIN HYDROCHLORIDE 1250 MG: 10 INJECTION, POWDER, LYOPHILIZED, FOR SOLUTION INTRAVENOUS at 01:02

## 2019-09-30 RX ADMIN — SUCRALFATE 1 G: 1 SUSPENSION ORAL at 21:07

## 2019-09-30 RX ADMIN — LIDOCAINE HYDROCHLORIDE 30 ML: 20 SOLUTION ORAL; TOPICAL at 11:23

## 2019-09-30 RX ADMIN — OXYCODONE HYDROCHLORIDE 10 MG: 5 SOLUTION ORAL at 08:05

## 2019-09-30 RX ADMIN — OXYCODONE HYDROCHLORIDE 10 MG: 5 SOLUTION ORAL at 00:23

## 2019-09-30 RX ADMIN — CARVEDILOL 6.25 MG: 6.25 TABLET, FILM COATED ORAL at 19:19

## 2019-09-30 RX ADMIN — LORAZEPAM 1 MG: 1 TABLET ORAL at 00:23

## 2019-09-30 RX ADMIN — CEFTRIAXONE SODIUM 2 G: 2 INJECTION, POWDER, FOR SOLUTION INTRAMUSCULAR; INTRAVENOUS at 16:37

## 2019-09-30 RX ADMIN — LORAZEPAM 1 MG: 1 TABLET ORAL at 22:14

## 2019-09-30 RX ADMIN — ONDANSETRON 4 MG: 4 TABLET, ORALLY DISINTEGRATING ORAL at 22:11

## 2019-09-30 RX ADMIN — VANCOMYCIN HYDROCHLORIDE 1250 MG: 10 INJECTION, POWDER, LYOPHILIZED, FOR SOLUTION INTRAVENOUS at 08:30

## 2019-09-30 RX ADMIN — SUCRALFATE 1 G: 1 SUSPENSION ORAL at 01:02

## 2019-09-30 RX ADMIN — DIPHENHYDRAMINE HYDROCHLORIDE 25 MG: 25 SOLUTION ORAL at 00:13

## 2019-09-30 RX ADMIN — SUCRALFATE 1 G: 1 SUSPENSION ORAL at 11:22

## 2019-09-30 RX ADMIN — DIPHENHYDRAMINE HYDROCHLORIDE 25 MG: 25 SOLUTION ORAL at 08:05

## 2019-09-30 RX ADMIN — SUCRALFATE 1 G: 1 SUSPENSION ORAL at 08:05

## 2019-09-30 RX ADMIN — OXYCODONE HYDROCHLORIDE 10 MG: 5 SOLUTION ORAL at 22:13

## 2019-09-30 RX ADMIN — VANCOMYCIN HYDROCHLORIDE 1250 MG: 10 INJECTION, POWDER, LYOPHILIZED, FOR SOLUTION INTRAVENOUS at 18:20

## 2019-09-30 RX ADMIN — ONDANSETRON 4 MG: 4 TABLET, ORALLY DISINTEGRATING ORAL at 08:33

## 2019-09-30 ASSESSMENT — ACTIVITIES OF DAILY LIVING (ADL)
ADLS_ACUITY_SCORE: 14
ADLS_ACUITY_SCORE: 15
ADLS_ACUITY_SCORE: 14
ADLS_ACUITY_SCORE: 14

## 2019-09-30 ASSESSMENT — PAIN DESCRIPTION - DESCRIPTORS
DESCRIPTORS: DISCOMFORT
DESCRIPTORS: DISCOMFORT
DESCRIPTORS: DISCOMFORT;BURNING
DESCRIPTORS: DISCOMFORT;BURNING
DESCRIPTORS: DISCOMFORT
DESCRIPTORS: DISCOMFORT
DESCRIPTORS: DISCOMFORT;BURNING

## 2019-09-30 ASSESSMENT — MIFFLIN-ST. JEOR: SCORE: 1778.93

## 2019-09-30 NOTE — PLAN OF CARE
Tmax 100.2. Echo done today. C/o abdominal discomfort/burning, 10mg oxycodone given x1, GI cocktail x1; some relief provided. Zofran given x1 for nausea. Pt requested a regular diet, only takes a few bites of food when eating. Pt self clamps/gravity drains G-tube; dressing changed, bile leakage at site & bile output. Pt's PIV started bleeding at the site, not painful, but new PIV ordered. New PIV placed and pt accidentally pulled it out. New PIV placed again, complicated by vancomycin extravasation. Day 1 extravasation protocol done. MIVF stopped. Pt receiving antibiotics; premedication of benadryl prior to vancomycin. Plan to continue w/ PIV's & initiate parental nutrition. Continue to monitor & w/ POC.      Per Mobility Level Guideline;  Bed/chair alarm No.  Patient may ambulate independently  Patient requires the following assistive equipment: cane/IV pole  PT/OT consult orders in place No

## 2019-09-30 NOTE — PROGRESS NOTES
"CLINICAL NUTRITION SERVICES - ASSESSMENT NOTE     Nutrition Prescription    RECOMMENDATIONS FOR MDs/PROVIDERS TO ORDER:  Recommend checking K+, Mg and PO4 daily to monitor for refeeding.    Malnutrition Status:    Unable to determine at this time    Recommendations already ordered by Registered Dietitian (RD):  - Order lab add on to check Mg and PO4  - Order entered to PPN as follows: Peripheral Clinimix @ 105 mL/hr to provide 2520 mL/day, 126 gm Dex (428 kcal) and 107g protein (428 kcal), no lipids per pt's request (doesn't tolerate in a peripheral solution). Regimen to provide 856 kcals (10 kcals/kg) and 1.3 g PRO/kg. Dosing wt = 84 kg    Future/Additional Recommendations:  Ability to resume pt's home PN regimen as follows: CPN with goal vol of 1800 ml/day over 14 hours with 235g Dex daily (799 kcal), 132g AA daily (528 kcal), and 250 ml 20% IV lipids 5x/wk.      REASON FOR ASSESSMENT  Parker Acevedo is a/an 48 year old male assessed by the dietitian for Admission Nutrition Risk Screen for reduced oral intake over the last month and tube feeding or parenteral nutrition and Pharmacy/Nutrition to Start and Manage PN (Start PPN). Pt with h/o short gut syndrome and on chronic PN.    NUTRITION HISTORY  Pt reports that he last received his home PN formula on 8/25 d/t onset of fevers and feeling ill for past week. Has a venting G tube which he uses for decompression and hence is only taking po for pleasure.     CURRENT NUTRITION ORDERS  Diet: Regular    LABS  No Mg or PO4 ordered at this time     MEDICATIONS  Medications reviewed    ANTHROPOMETRICS  Height: 180.3 cm (5' 11\")  Most Recent Weight: 87.7 kg (193 lb 4.8 oz) - today, Admit wt = 85.8 kg (189 lbs) on 8/29 (lowest wt this admission)  IBW: 78 kg  BMI: Overweight BMI 25-29.9  Weight History: Pt reports his UBW = 185 lbs (84 kg)     Wt Readings from Last 10 Encounters:   09/30/19 87.7 kg (193 lb 4.8 oz)   09/11/19 88.5 kg (195 lb)   08/19/19 85.3 kg (188 lb) "   08/07/19 84.4 kg (186 lb)   07/22/19 84.4 kg (186 lb)   07/10/19 95.3 kg (210 lb)   07/10/19 95.3 kg (210 lb)   06/12/19 90.7 kg (200 lb)   05/24/19 90.9 kg (200 lb 6.4 oz)   05/23/19 89.8 kg (198 lb)     Dosing Weight: 84 kg     ASSESSED NUTRITION NEEDS  Estimated Energy Needs: 2430-0604 kcals/day (25 - 30 kcals/kg)  Justification: Maintenance  Estimated Protein Needs: 101-126 grams protein/day (1.2 - 1.5 grams of pro/kg)  Justification: Hypercatabolism with acute illness  Estimated Fluid Needs:  (1 mL/kcal)   Justification: Maintenance    PHYSICAL FINDINGS  See malnutrition section below.    MALNUTRITION (Limited d/t pt wearing heavy street clothing)  % Intake: </= 50% for >/= 5 days (severe)  % Weight Loss: None noted  Subcutaneous Fat Loss: Unable to assess  Muscle Loss: Unable to assess  Fluid Accumulation/Edema: Unable to assess  Malnutrition Diagnosis: Unable to determine due to unable to complete nutritional physical assessment    NUTRITION DIAGNOSIS  Inadequate protein-energy intake related to dependent on CPN d/t h/o short gut syndrome, but unable to initiate d/t concern for line infection and PPN ordered, but not yet initiated as evidenced by no PPN intakes received at this time.      INTERVENTIONS  Implementation  Nutrition Education: Provided education on plan to start PPN for a few days until able to resume home CPN via CL  Collaboration with Provider and Pharm D regarding peripheral PN solution  Parenteral Nutrition/IV Fluids - as outlined above     Goals  1. Ability to resume home CPN formula over next 48 hrs   2. Total avg nutritional intake to meet a minimum of 25 kcal/kg and 1.2 g PRO/kg daily (per dosing wt 84 kg)      Monitoring/Evaluation  Progress toward goals will be monitored and evaluated per protocol.  Araseli Eden RD,LD  Unit pager [phone]

## 2019-09-30 NOTE — TELEPHONE ENCOUNTER
Please see MyChart encounter dated 9/16/19 prior to signing.     ERIN Lazar, RN  Care Coordinator  [facility] [place]

## 2019-09-30 NOTE — TELEPHONE ENCOUNTER
Reason for Call:  Form, our goal is to have forms completed with 72 hours, however, some forms may require a visit or additional information.    Type of letter, form or note:  medical    Who is the form from?: [facility] (if other please explain)    Where did the form come from: form was faxed in    What clinic location was the form placed at?: [facility] - [phone]    Where the form was placed:  Box/Folder    What number is listed as a contact on the form?: [phone]       Additional comments: sign fax back    Call taken on 9/30/2019 at 10:33 AM by Chasidy Cota

## 2019-09-30 NOTE — H&P
[facility], [place]    History and Physical - [place]oon 1 Service        Date of Admission:  9/29/2019    Assessment & Plan   Parker Acevedo is a 48 year old male with PMH of Celestino-en-Y gastric bypass complicated by marginal ulceration requiring multiple revisions subsequently requring lap corey, gastrojejunostomy, gastrectomy, appendectomy, chronic abdominal pain on opioids, PUD, severe malnutrition, short gut syndrome on chronic TPN with history of recurrent bacteremia who presents to ED with positive blood cultures.     Streptococcus and Staph Epidermidis bacteremia  Patient with fevers over the past few days. Called home health nurse who tata blood cultures on 9/27 which were positive for Streptococcus and Staph Epidermidis. Started on Vancomycin and Ceftriaxone in ED after discussion with ID. Patient has WBC of 8.2, UA negative, CXR without evidence of infection. Concern that patient's gram positive bacteremia is secondary to Cm.   - ID consult  - Continue IV Vancomycin and Ceftriaxone   - Benadryl prior to Vancomycin infusion (due to hx of Tom Syndrome)   - TTE ordered  - Blood cultures in the AM     Chronic pain  - Continue PTA fentanyl 25mcg/hr q48h   - Continue lidocaine patches  - Continue PTA prn oxycodone     Hx of Celestino-en-Y gastric bypass c/b short gut syndrome requiring TPN   - Hold TPN due to concern for infected Cm, touch base with ID in the AM to see if okay to use  - Dietician consult for TPN   - mIVFs     ADHD  - Continue Adderall     Diet: Clear liquid diet, hold TFs  Fluids: LR @125ml/hr   DVT Prophylaxis: Pneumatic Compression Devices  Sanchez Catheter: not present  Code Status: Full Code      Disposition Plan   Expected discharge: 2 - 3 days, recommended to prior living arrangement once bacteremia resolved.  Entered: Astrid Nesbitt MD 09/29/2019, 10:58 PM       The patient's care was discussed with the Attending Physician,   "Sebastian.    DO Dez Villafana 1 Service  [facility], [place]  Pager: [phone]  Please see sticky note for cross cover information  ______________________________________________________________________    Chief Complaint   Fever, chills     History is obtained from the patient    History of Present Illness   Parker Acevedo is a 48 year old male with PMH of Celestino-en-Y gastric bypass complicated by marginal ulceration requiring multiple revisions subsequently requring lap corey, gastrojejunostomy, gastrectomy, appendectomy, chronic abdominal pain on opioids, PUD, severe malnutrition, short gut syndrome on chronic TPN with history of recurrent bacteremia who presents to ED with positive blood cultures.     The patient has a history of recurrent infections. Most recently infected with Rothia mucilaginosa in 05/2019. After treatment of this infection, he had Cm placed in 08/2019. The patient states that a few days ago, he started to develop increased fatigue and chills. Due to concern for infection, he had his home health nurse come to his home to draw blood cultures. The patient was then called and told to come to the hospital today because he had positive blood cultures.     The patient states that he fevers intermittently, but he generally fevers when using his Cm catheter. He has stopped his TPN because of this and has not had any TPN over the last 2-3 days. The patient denies headache, dizziness, lightheadedness. Denies chest pain, but he states he has noticed a weird \"fluttering\" sensation in his chest at times. Denies shortness of breath. Vomiting is at patient's baseline, which he states is bilious if he eats too much food. He also has chronic diarrhea, which he states is unchanged from his baseline.     Review of Systems    The 10 point Review of Systems is negative other than noted in the HPI.     Past Medical History    I have reviewed this " patient's medical history and updated it with pertinent information if needed.   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)      Chronic abdominal pain      Complication of anesthesia      Difficulty swallowing      Gastric ulcer, unspecified as acute or chronic, without mention of hemorrhage, perforation, or obstruction      Gastro-oesophageal reflux disease      Generalized weakness 1/30/2018     Head injury      Hiatal hernia      Other bladder disorder      Other chronic pain      PONV (postoperative nausea and vomiting)      Severe malnutrition (H)     TPN     Short gut syndrome      Tobacco abuse         Past Surgical History   I have reviewed this patient's surgical history and updated it with pertinent information if needed.  Past Surgical History:   Procedure Laterality Date     AMPUTATION       APPENDECTOMY       BACK SURGERY  11/3/2014    curve in the spine     BIOPSY LYMPH NODE CERVICAL N/A 2/20/2015    Procedure: BIOPSY LYMPH NODE CERVICAL;  Surgeon: Baron Scanlon MD;  Location: [facility] OR     C GASTRIC BYPASS,OBESE<100CM SHAYLEE-EN-Y  2002    lost 300 pounds     CHOLECYSTECTOMY       DISCECTOMY, FUSION CERVICAL ANTERIOR ONE LEVEL, COMBINED N/A 2/15/2017    Procedure: COMBINED DISCECTOMY, FUSION CERVICAL ANTERIOR ONE LEVEL;  Surgeon: Darren Campos MD;  [facility]: [facility]     ENDOSCOPIC INSERTION TUBE GASTROSTOMY  9/9/2013    Procedure: ENDOSCOPIC INSERTION TUBE GASTROSTOMY;;  Surgeon: Francis Vyas MD;  [facility]     ENDOSCOPIC ULTRASOUND UPPER GASTROINTESTINAL TRACT (GI)  4/29/2011    Procedure:ENDOSCOPIC ULTRASOUND UPPER GASTROINTESTINAL TRACT (GI); Both Procedures done Conjointly; Surgeon:NEREIDA HOUSER; [facility]     ENDOSCOPIC ULTRASOUND UPPER GASTROINTESTINAL  TRACT (GI)  9/9/2013    Procedure: ENDOSCOPIC ULTRASOUND UPPER GASTROINTESTINAL TRACT (GI);  Endoscopic Ultrasound Guide Gastrostomy Tube Placement  C-arm;  Surgeon: Noe Lizarraga MD;  [facility]     ENDOSCOPY  03/25/11    EGD, [facility]     ENDOSCOPY  08/04/09    Upper Endoscopy, [facility]     ENDOSCOPY  01/05/09    Upper Endoscopy, [facility]     ESOPHAGOSCOPY, GASTROSCOPY, DUODENOSCOPY (EGD), COMBINED  4/20/2011    Procedure:COMBINED ESOPHAGOSCOPY, GASTROSCOPY, DUODENOSCOPY (EGD); Surgeon:BLU VEGA; [facility]     ESOPHAGOSCOPY, GASTROSCOPY, DUODENOSCOPY (EGD), COMBINED  6/15/2011    Procedure:COMBINED ESOPHAGOSCOPY, GASTROSCOPY, DUODENOSCOPY (EGD); Surgeon:BLU VEGA; [facility]     ESOPHAGOSCOPY, GASTROSCOPY, DUODENOSCOPY (EGD), COMBINED  6/12/2013    Procedure: COMBINED ESOPHAGOSCOPY, GASTROSCOPY, DUODENOSCOPY (EGD);;  Surgeon: Blu Vega MD;  [facility]     ESOPHAGOSCOPY, GASTROSCOPY, DUODENOSCOPY (EGD), COMBINED  11/22/2013    Procedure: COMBINED ESOPHAGOSCOPY, GASTROSCOPY, DUODENOSCOPY (EGD);;  Surgeon: Blu Vega MD;  Location:  OR     ESOPHAGOSCOPY, GASTROSCOPY, DUODENOSCOPY (EGD), COMBINED  4/30/2014    Procedure: COMBINED ESOPHAGOSCOPY, GASTROSCOPY, DUODENOSCOPY (EGD);  Surgeon: Blu Vega MD;  [facility]     ESOPHAGOSCOPY, GASTROSCOPY, DUODENOSCOPY (EGD), COMBINED N/A 2/20/2015    Procedure: COMBINED ESOPHAGOSCOPY, GASTROSCOPY, DUODENOSCOPY (EGD), BIOPSY SINGLE OR MULTIPLE;  Surgeon: Baron Scanlon MD;  Location:  OR     ESOPHAGOSCOPY, GASTROSCOPY, DUODENOSCOPY (EGD), COMBINED N/A 9/30/2015    Procedure: COMBINED ESOPHAGOSCOPY, GASTROSCOPY, DUODENOSCOPY (EGD);  Surgeon: Blu Vega MD;  [facility]     GASTRECTOMY  6/22/2012    Procedure: GASTRECTOMY;  Open Approach, Excise Ulcers,Partial Gastrectomy, Esophagojejunostomy, Hiatal Hernia Repair, Extensive Lysis of Adhesions  and Esaphagogastrodudenoscopy.;  Surgeon: Francis Vyas MD;  [facility]     GASTROJEJUNOSTOMY  08/26/09    Extensice enterolysis, partial resect. jejunum, part. resect gastric pouch, gastrojejunostomy anastomosis     HC ESOPH/GAS REFLUX TEST W NASAL IMPED ELECTRODE  8/5/2013    Procedure: ESOPHAGEAL IMPEDENCE FUNCTION TEST 1 HOUR OR LESS;  Surgeon: Halie Lang MD;  [facility]     HEAD & NECK SURGERY  2/15/2017    C5-C6     HERNIA REPAIR  2006    Umbilical hernia     HERNIORRHAPHY HIATAL  6/22/2012    Procedure: HERNIORRHAPHY HIATAL;;  Surgeon: Francis Vyas MD;  [facility]     HERNIORRHAPHY INGUINAL  11/22/2013    Procedure: HERNIORRHAPHY INGUINAL;;  Surgeon: Francis Vyas MD;  [facility]     INSERT PORT VASCULAR ACCESS Right 12/19/2017    Procedure: INSERT PORT VASCULAR ACCESS;  Right Chest Port Placement ;  Surgeon: Liasndro Alejandro PA-C;  [facility]     INSERT PORT VASCULAR ACCESS Right 8/2/2018    Procedure: INSERT PORT VASCULAR ACCESS;  Place single lumen tunneled central venous access catheter;  Surgeon: Guy Jamil PA-C;  [facility]     IR CVC TUNNEL PLACEMENT > 5 YRS OF AGE  8/7/2019     IR FOLLOW UP VISIT OUTPATIENT  8/7/2019     IR PICC PLACEMENT > 5 YRS OF AGE  3/7/2019     LAPAROTOMY EXPLORATORY  11/22/2013    Procedure: LAPAROTOMY EXPLORATORY;  Exploratory Laparotomy, Upper Endoscopy, Left Inguinal Hernia Repair;  Surgeon: Francis Vyas MD;  [facility]     ORTHOPEDIC SURGERY       PICC INSERTION Right 03/16/2017    5fr DL BioFlo PICC, 42cm (3cm external) in the R medial brachial vein w/ tip in the SVC RA junction.     PICC INSERTION Left 09/23/2017    5fr DL BioFlo PICC, 45cm (1cm external) in the L basilic vein w/ tip in the SVC RA junction.     PICC INSERTION Right 05/16/2019    5Fr - 43cm, Medial brachial vein, low SVC     SHAYLEE EN Y BOWEL  2003     SOFT TISSUE SURGERY       SOFT TISSUE SURGERY        THORACIC SURGERY       TONSILLECTOMY       TRANSESOPHAGEAL ECHOCARDIOGRAM INTRAOPERATIVE N/A 1/8/2019    Procedure: TRANSESOPHAGEAL ECHOCARDIOGRAM INTRAOPERATIVE;  Surgeon: GENERIC ANESTHESIA PROVIDER;  [facility]        Social History   I have reviewed this patient's social history and updated it with pertinent information if needed. Parker Acevedo  reports that he has been smoking cigarettes. He has a 0.75 pack-year smoking history. He has never used smokeless tobacco. He reports that he does not drink alcohol or use drugs.    Family History   I have reviewed this patient's family history and updated it with pertinent information if needed.   Family History   Problem Relation Age of Onset     Gastrointestinal Disease Mother         Crohns disease     Anxiety Disorder Mother      Thyroid Disease Mother         Grave's disease     Cancer Father         ear cancer-skin cancer/melanoma     Breast Cancer Maternal Grandmother      Macular Degeneration Maternal Grandfather      Anxiety Disorder Sister      Diabetes Maternal Uncle      Breast Cancer Other      Hypertension No family hx of      Hyperlipidemia No family hx of      Cerebrovascular Disease No family hx of      Prostate Cancer No family hx of      Depression No family hx of      Anesthesia Reaction No family hx of      Asthma No family hx of      Osteoporosis No family hx of      Genetic Disorder No family hx of      Obesity No family hx of      Mental Illness No family hx of      Substance Abuse No family hx of      Glaucoma No family hx of        Prior to Admission Medications   Prior to Admission Medications   Prescriptions Last Dose Informant Patient Reported? Taking?   CARAFATE 1 GM/10ML suspension Past Week at Unknown time Self Yes Yes   [facility] MANAGED PATIENT  Self No No   Sig: Contact [facility] for patient specific medication information at [phone] on admission and discharge from the hospital.  Phones are  "answered 24 hours a day 7 days a week 365 days a year.    Providers - Choose \"CONTINUE HOME MED (no script)\" at discharge if patient treatment with home infusion will continue.   LORazepam (ATIVAN) 1 MG tablet 2019 at Unknown time  No Yes   Si-2 mg as needed for anxiety with TPN and medications   Needle, Disp, (BD DISP NEEDLES) 27G X 1/2\" MISC Past Week at Unknown time  No Yes   Si Device every 30 days Use for cyanocobalamin injection once q 30 days.   UNABLE TO FIND Unknown at Unknown time Self Yes No   Sig: States takes TPN 2050 ml  Every 14 hours through PICC   acetaminophen (TYLENOL) 32 mg/mL liquid Past Week at Unknown time Self Yes Yes   Sig: Take 500 mg by mouth every 4 hours as needed for fever or mild pain   albuterol (PROAIR HFA/PROVENTIL HFA/VENTOLIN HFA) 108 (90 Base) MCG/ACT inhaler Unknown at Unknown time  No No   Sig: Inhale 2 puffs into the lungs every 4 hours as needed for shortness of breath / dyspnea or wheezing   amphetamine-dextroamphetamine (ADDERALL) 20 MG tablet 2019 at Unknown time  No Yes   Sig: Take 1 tablet (20 mg) by mouth daily   blood glucose (NO BRAND SPECIFIED) lancets standard Past Week at Unknown time Self No Yes   Sig: Use to test blood sugar 1 times daily or as directed.   blood glucose (NO BRAND SPECIFIED) test strip Past Week at Unknown time Self No Yes   Sig: Use to test blood sugar 1 times daily or as directed.   blood glucose monitoring (NO BRAND SPECIFIED) meter device kit Past Week at Unknown time Self No Yes   Sig: Use to test blood sugar 1 times daily or as directed.   carvedilol (COREG) 6.25 MG tablet 2019 at Unknown time Self Yes Yes   Sig: Take 6.25 mg by mouth 2 times daily (with meals)   cyanocobalamin (CYANOCOBALAMIN) 1000 MCG/ML injection Unknown at Unknown time Self No No   Sig: Inject 1 mL (1,000 mcg) into the muscle every 30 days   diphenhydrAMINE (BENADRYL) 12.5 MG/5ML solution 2019 at Unknown time Self No Yes   Sig: Take 10 mLs (25 " mg) by mouth 3 times daily as needed for itching (Prior to vanco administration)   fentaNYL (DURAGESIC) 25 mcg/hr 72 hr patch 9/29/2019 at Unknown time  No Yes   Sig: Place 1 patch onto the skin every 48 hours remove old patch.   May fill 9/3/19 and start 9/5/19. Stop oxycodone   lidocaine (LIDODERM) 5 % Patch Past Month at Unknown time Self No Yes   Sig: Place 1-2 patches onto the skin every 24 hours Wear for 12 hours, remove for 12 hours.  OK to cut to better fit to size.   naloxone (NARCAN) 4 MG/0.1ML nasal spray Unknown at Unknown time Self Yes No   Sig: Spray 4 mg into one nostril alternating nostrils once as needed every 2-3 minutes until assistance arrives   ondansetron (ZOFRAN-ODT) 8 MG ODT tab 9/29/2019 at Unknown time  No Yes   Sig: DISSOLVE ONE TABLET ON TONGUE EVERY 8 HOURS AS NEEDED FOR NAUSEA   order for DME Unknown at Unknown time Self No No   Sig: Equipment being ordered: Bilateral knee high chronic venous insufficiency stockings--  mild-moderate pressures.   order for DME Unknown at Unknown time  No No   Sig: Equipment being ordered: Urine Drainage bag, leg.   oxyCODONE (ROXICODONE) 5 MG/5ML solution 9/28/2019 at Unknown time  No Yes   Sig: Take 10 mLs (10 mg) by mouth daily as needed for pain . 30 day supply   polyethylene glycol (MIRALAX/GLYCOLAX) powder Unknown at Unknown time Self No No   Sig: Take 17 g (1 capful) by mouth daily   sodium chloride 0.9% infusion Unknown at Unknown time Self Yes No   Sig: Inject 1,000-2,000 mLs into the vein as needed    sucralfate (CARAFATE) 1 GM tablet 9/28/2019 at Unknown time  No Yes   Sig: Take 1 tablet (1 g) by mouth 4 times daily   vitamin D2 (ERGOCALCIFEROL) 13021 units (1250 mcg) capsule Past Week at Unknown time Self No Yes   Sig: Take 1 capsule (50,000 Units) by mouth once a week      Facility-Administered Medications: None     Allergies   Allergies   Allergen Reactions     Bactrim [Sulfamethoxazole W/Trimethoprim] Rash     Penicillins Anaphylaxis      Please see Antimicrobial Management Team allergy assessment note 10/10/2018. Patient reported tolerating amoxicillin.     Doxycycline Rash     Vancomycin Rash     Rash after receiving vancomycin 3/28/16 (red man's?). Tolerated with slower infusion and diphenhydramine premed.       Physical Exam   Vital Signs: Temp: 97.1  F (36.2  C) Temp src: Oral BP: 127/83   Heart Rate: 72 Resp: 16 SpO2: 96 % O2 Device: None (Room air)    Weight: 191 lbs 3.2 oz     General: Middle aged gentleman sitting on edge of bed in no acute distress.   HEENT: Normocephalic, atraumatic, adentulous. No oral lesions.   Heart: RRR no murmurs sitting upright or in left lateral decubitus position  Lungs: CTAB no wheezes, rhonchi  Abdomen: Lots of loose abdominal skin, midline abdominal scar, Jim port intact with bile surrounding it but appears uninfected. Slightly tender to palpation, nondistended, no rigidity/guarding.  Skin: Cm catheter in place in right upper chest. Nontender, appears uninfected.      Data   Data reviewed today: I reviewed all medications, new labs and imaging results over the last 24 hours.    Recent Labs   Lab 09/29/19  1559   WBC 8.2   HGB 13.8   MCV 95         POTASSIUM 3.8   CHLORIDE 107   CO2 32   BUN 10   CR 0.78   ANIONGAP 4   SILAS 8.5   GLC 89   ALBUMIN 3.6   PROTTOTAL 7.2   BILITOTAL 0.4   ALKPHOS 96   ALT 18   AST 8   LIPASE 58*

## 2019-09-30 NOTE — ED NOTES
[facility], [place]   ED Nurse to Floor Handoff     Parker Acevedo is a 48 year old male who speaks English and lives with a spouse,  in a home  They arrived in the ED by car from home    ED Chief Complaint: Critical Values    ED Dx;   Final diagnoses:   Positive blood culture   Fever, unspecified fever cause   On total parenteral nutrition         Needed?: No    Allergies:   Allergies   Allergen Reactions     Bactrim [Sulfamethoxazole W/Trimethoprim] Rash     Penicillins Anaphylaxis     Please see Antimicrobial Management Team allergy assessment note 10/10/2018. Patient reported tolerating amoxicillin.     Doxycycline Rash     Vancomycin Rash     Rash after receiving vancomycin 3/28/16 (red man's?). Tolerated with slower infusion and diphenhydramine premed.   .  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)      Chronic abdominal pain      Complication of anesthesia      Difficulty swallowing      Gastric ulcer, unspecified as acute or chronic, without mention of hemorrhage, perforation, or obstruction      Gastro-oesophageal reflux disease      Generalized weakness 1/30/2018     Head injury      Hiatal hernia      Other bladder disorder      Other chronic pain      PONV (postoperative nausea and vomiting)      Severe malnutrition (H)     TPN     Short gut syndrome      Tobacco abuse       Baseline Mental status: WDL  Current Mental Status changes: at basesline    Infection present or suspected this encounter: cultures pending  Sepsis suspected: Yes  Isolation type: No active isolations     Activity level - Baseline/Home:  Independent  Activity Level - Current:   Independent    Bariatric equipment needed?: No    In the ED these meds were  given:   Medications   vancomycin (VANCOCIN) 2,250 mg in sodium chloride 0.9 % 500 mL intermittent infusion (2,250 mg Intravenous New Bag 9/29/19 1818)   vancomycin 1250 mg in 0.9% NaCl 250 mL intermittent infusion 1,250 mg (has no administration in time range)   0.9% sodium chloride BOLUS (0 mLs Intravenous Stopped 9/29/19 1732)   cefTRIAXone (ROCEPHIN) 1 g vial to attach to  mL bag for ADULTS or NS 50 mL bag for PEDS (0 g Intravenous Stopped 9/29/19 1732)   HYDROmorphone (PF) (DILAUDID) injection 0.5 mg (0.5 mg Intravenous Given 9/29/19 1730)   ondansetron (ZOFRAN) injection 4 mg (4 mg Intravenous Given 9/29/19 1728)   diphenhydrAMINE (BENADRYL) injection 50 mg (50 mg Intravenous Given 9/29/19 1816)       Drips running?  Yes    Home pump  Yes    Current LDAs  Peripheral IV 07/23/19 Left;Anterior Upper forearm (Active)   Number of days: 68       Peripheral IV 09/29/19 Right Lower forearm (Active)   Site Assessment WDL 9/29/2019  4:12 PM   Number of days: 0       PICC Double Lumen 05/16/19 Right Brachial vein medial (Active)   Number of days: 136       CVC Single Lumen 08/07/19 Right Internal jugular (Active)   Number of days: 53       Gastrostomy/Enterostomy Gastrostomy LUQ 1 18 fr (Active)   Number of days: 2642       Wound 06/05/18 Left;Posterior;Mid;Inner;Lower Arm Ulceration Bruised w/ an unopened sore (Active)   Number of days: 481       Labs results:   Labs Ordered and Resulted from Time of ED Arrival Up to the Time of Departure from the ED   LIPASE - Abnormal; Notable for the following components:       Result Value    Lipase 58 (*)     All other components within normal limits   UA MACROSCOPIC WITH REFLEX TO MICRO AND CULTURE - Abnormal; Notable for the following components:    Blood Urine Trace (*)     pH Urine 7.5 (*)     All other components within normal limits   CBC WITH PLATELETS DIFFERENTIAL   COMPREHENSIVE METABOLIC PANEL   LACTIC ACID WHOLE BLOOD   BLOOD CULTURE   BLOOD CULTURE       Imaging  "Studies:   Recent Results (from the past 24 hour(s))   XR Chest 2 Views    Narrative    XR CHEST 2 VW  9/29/2019 4:16 PM    History:  fever, TPN, + blood culture.     Comparison: Chest radiograph on 7/22/2019    Findings:   PA and the lateral view of the chest. Right central venous catheter  with the tip projects over cavoatrial junction. Cardiac silhouette is  within normal limits. Pulmonary vasculature is distinct. No acute  airspace opacities. No pneumothorax or significant pleural effusion.  Chronic mild blunting of left costophrenic angle. No acute osseous  abnormality.      Impression    IMPRESSION:  No acute cardiac pulmonary process.    I have personally reviewed the examination and initial interpretation  and I agree with the findings.    TIANNA FARMER MD       Recent vital signs:   /86   Temp 98.7  F (37.1  C) (Oral)   Resp 16   Ht 1.803 m (5' 11\")   Wt 85.8 kg (189 lb 1.6 oz)   SpO2 98%   BMI 26.37 kg/m      Maris Coma Scale Score: 15 (09/29/19 1826)       Cardiac Rhythm: Normal Sinus  Pt needs tele? No  Skin/wound Issues: None G-tube    Code Status: Full Code    Pain control: good    Nausea control: fair    Abnormal labs/tests/findings requiring intervention: pt called at home for positive blood cultures; pt had blood cultures re-drawn in ER and antibiotics started.     Family present during ED course? Yes   Family Comments/Social Situation comments: spouse at bedside.     Tasks needing completion: None    Karmen Aldridge, RN  [phone]    "

## 2019-09-30 NOTE — PROGRESS NOTES
This is a recent snapshot of the patient's [facility] medical record.  For current drug dose and complete information and questions, call [phone] or In Basket pool, fv home infusion ([id number])  CSN Number:  [id number]

## 2019-09-30 NOTE — PROGRESS NOTES
This is a recent snapshot of the patient's [facility] medical record.  For current drug dose and complete information and questions, call [phone] or In Basket pool, fv home infusion ([id number])  CSN Number:  [id number]

## 2019-09-30 NOTE — PLAN OF CARE
Nursing Focus: Admission  D: Arrived at 2100 from ER via transport. Patient accompanied by spouse. Admitted for possible infection. Complains of abdominal pain.      I: Admission process began.  Patient oriented to room, enviroment, call light.  Md. notified of patients arrival on unit.     A: Vital signs stable, afebrile.  A/O x4 but forgetful, asks wife basic questions about personal health. Pt c/o abdominal pain - 10mg oxy given x1, fentanyl patch placed as ordered - opoid contract being followed, pt cooperative.  Pt c/o nausea - zofran given x1. Pt given ativan x1 to help with with anxiety at nighttime. Pt denied SOB. Up independently, uses IV pole for support, states he uses a cane at home. Vanco given as scheduled through PIV - verified with provider to infuse fluids/medications through PIV in place of Cm tonight - follow up necessary from day team. Premedicated vanco with benadryl as ordered. Jtube WDL, Billy-Key ordered for draining. Clear liquid diet ordered, TPN to be continued on 9/30. No significant events this shift, spouse at bedside, pt rested between nursing cares.    P: Implement plan of care when available. Continue to monitor patient. Nursing interventions as appropriate. Notify md with changes in pt status.    Per Mobility Level Guideline;  Bed/chair alarm No  Patient may ambulate Independent  Patient requires the following assistive equipment: Uses IV pole/has cane at home  PT/OT consult orders in place No

## 2019-09-30 NOTE — PROGRESS NOTES
Care Coordinator - Discharge Planning    Admission Date/Time:  9/29/2019  Attending MD:  Eva Gilmore MD     Data  Chart reviewed, discussed with interdisciplinary team.   Patient was admitted for:   1. Positive blood culture    2. Fever, unspecified fever cause    3. On total parenteral nutrition         Assessment     Pt status reviewed/discussed during care team rounds.  Pt admitted with c/o fever.  Pt with complex medical history on chronic TPN with history of recurrent bacteremia.   ID consulted.  Plan for ECHO today.    Met with pt.  Introduced RNCC role.  Per pt and his spouse pt continues to receive RN services through [facility] (formerly [facility]) and home TPN through [facility].    Agreed to f/u with pt closer to discharge.  Anticipate that pt will resume home RN and home infusion services.       Coordination of Care and Referrals: Provided patient/family with options for Home Care and Home Infusion.      Plan  Anticipated Discharge Date:  TBD  Anticipated Discharge Plan:  TBD    Genesis Rivas RN BSN, PHN RN Care Coordinator  Internal Medicine   [phone]  Pager: [phone]  Weekend RN Care Coordinator job code * * * [id number]  9/30/2019 3:51 PM

## 2019-09-30 NOTE — PROGRESS NOTES
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      Streptococcus and Staph Epidermidis bacteremia  Patient with fevers over the past few days. Called home health nurse who tata blood cultures on 9/27 which were positive for Streptococcus and Staph Epidermidis. Started on Vancomycin and Ceftriaxone in ED after discussion with ID. Patient had WBC of 8.2, UA negative, CXR without evidence of infection on admission. Concern that patient's gram positive bacteremia is secondary to Cm. TTE without evidence of vegetation.   - ID consult   - Continue Vancomycin and Ceftriaxone    - If no growth from blood cultures over the next 48hours, consider discontinuing Ceftriaxone 10/2    - IR consult to remove Cm catheter    - Vancomycin lock therapy started   - Benadryl prior to Vancomycin infusion (due to hx of Tom Syndrome)   - Daily blood cultures      Chronic pain  - Continue PTA fentanyl 25mcg/hr q48h   - Continue lidocaine patches  - Increase prn oxycodone to TID       Hx of Celestino-en-Y gastric bypass c/b short gut syndrome requiring TPN   - Nutrition consult, appreciate recommendations   - PPN through peripheral IV  - If unable to have PPN through peripheral IV, okay to place PICC (preferred over midline)      ADHD  - Continue Adderall     Diet: Regular Diet Adult  NPO for Medical/Clinical Reasons Except for: Meds, Ice Chips  parenteral nutrition - Clinimix E    Fluids: None   DVT Prophylaxis: Pneumatic Compression  Devices  Sanchez Catheter: Not present  Code Status: Full Code       Disposition Plan     Expected discharge: 2 - 3 days, recommended to prior living arrangement once bacteremia resolved.  Entered: Astrid Nesbitt MD 09/29/2019, 10:58 PM        The patient's care was discussed with the Attending Physician, DO Dez Allison 1 Service  [facility], [place]  Pager: [phone]  Please see sticky note for cross cover information     ______________________________________________________________________    Interval History   Tmax 100.2. No acute events overnight. Patient feels well this afternoon. Denies chest pain, SOB, abdominal pain. States he wishes his bilious vomiting would go away. Finds Carafate helpful but it is very expensive as an outpatient. Wants his oxycodone increased to TID prn.     Data reviewed today: I reviewed all medications, new labs and imaging results over the last 24 hours.     Physical Exam   Vital Signs: Temp: 100.2  F (37.9  C) Temp src: Oral BP: 134/87   Heart Rate: 83 Resp: 18 SpO2: 97 % O2 Device: None (Room air)    Weight: 193 lbs 4.8 oz  General: Middle aged gentleman sitting on edge of bed in no acute distress.   HEENT: Normocephalic, atraumatic, adentulous. No oral lesions.   Heart: RRR no murmurs sitting upright or in left lateral decubitus position  Lungs: CTAB no wheezes, rhonchi  Abdomen: Lots of loose abdominal skin, midline abdominal scar, Jim port intact with bile surrounding it but appears uninfected. Slightly tender to palpation, nondistended, no rigidity/guarding.  Skin: Cm catheter in place in right upper chest. Nontender, appears uninfected.     Data   Recent Labs   Lab 09/30/19  0610 09/29/19  1559   WBC 7.9 8.2   HGB 12.5* 13.8   MCV 96 95   * 169    143   POTASSIUM 3.7 3.8   CHLORIDE 112* 107   CO2 24 32   BUN 10 10   CR 0.65* 0.78   ANIONGAP 8 4   SILAS 8.2* 8.5   GLC 95 89   ALBUMIN  --   3.6   PROTTOTAL  --  7.2   BILITOTAL  --  0.4   ALKPHOS  --  96   ALT  --  18   AST  --  8   LIPASE  --  58*

## 2019-09-30 NOTE — CONSULTS
GENERAL ID SERVICE: NEW CONSULTATION  Patient:  Parker Acevedo, Date of birth 1970, Medical record number [id number]  Date of Admission: 9/29/2019  Date of Visit:  9/30/2019  Requesting Provider: Eva Gilmore         Assessment and Recommendations:   Problem List:    1. Streptococcus and Staphylococcus epidermidis bloodstream infection likely related to Cm catheter - fever during TPN infusion through TPN  2. Celestino-en-Y gastric bypass  esophagojejunostomy c/b short gut syndrome, and chronic malnutrition on TPN  3. History of recurrent polymicrobial bacteremia with several central line replacements in the past (always placed on the right side because of difficulty to place on the left), and more recently (Jan 2019) and episode of candidemia with line replacement.     Discussion:    This is a 48 year-old male admitted on 9/29/19. He has  PMHx of Celestino-en-Y gastric bypass  esophagojejunostomy c/b short gut syndrome, and chronic malnutrition on TPN with recurrent polymicrobial bacteremia with several central line replacements in the past (always placed on the right side because of difficulty to place on the left), and more recently (Jan 2019) and episode of candidemia with line replacement. Patient more recently had a Cm catheter  placed on the right side on 8/7/19 for TPN continuation (16 hrs of infusion a day + 2 hrs of hydration). The most recent line was never infected in the past, although on 9/27 he developed fever while TPN was being infused through the Cm catheter. Blood cultures were obtained through in his house (through home care) and  blood culture from Cm was positive for Streptococcus and Staph Epidermidis with positive mecA. Blood culture from periphery is negative to date.  Started on Vancomycin and Ceftriaxone in ED after discussion with ID.  After admission, blood cultures were repeated on 9/29 and 9/30 and are negative to date.     The picture is very suggestive  of catheter related infection. The two organisms are relatively benign, although patient has a very complicated recurrent history of catheter related infections with failure of salvage therapies in the past. Given the complex history he would like to pursue catheter removal. The challenge is that he lost his peripheric IV. Primary team will attempt to place another peripheric access but if failure then a PICC line will be placed.        Recommendations:  1. Please continue Vancomycin and ceftriaxone at this moment, if no additional growth from blood cultures over the next 48h, I might consider discontinuing ceftriaxone by 10/2        - Plan to treat with vancomycin for 7-10 days if Cm is removed  2. PICC line can be placed if no possibility of peripheric access   3. Cm catheter can be removed (please see discussion above) but while in place, vancomycin lock therapy can be started    Thank you for this consult. The General ID team will continue to follow this patient. Please feel free to call with any questions.     Sandra Horne MD  Date of Service: 09/30/19  Pager: [phone]       History of Present Illness:   This is a 48 year-old male admitted on 9/29/19. He has  PMHx of Celestino-en-Y gastric bypass  esophagojejunostomy c/b short gut syndrome, and chronic malnutrition on TPN with recurrent polymicrobial bacteremia with several central line replacements in the past (always placed on the right side because of difficulty to place on the left), and more recently (Jan 2019) and episode of candidemia with line replacement. Patient more recently had a Cm catheter  placed on the right side on 8/7/19 for TPN continuation (16 hrs of infusion a day + 2 hrs of hydration). The most recent line was never infected in the past, although on 9/27 he developed fever while TPN was being infused through the Cm catheter. Blood cultures were obtained through in his house (through home care) and  blood culture from  Cm was positive for Streptococcus and Staph Epidermidis with positive mecA. Blood culture from periphery is negative to date.  Started on Vancomycin and Ceftriaxone in ED after discussion with ID. Patient has WBC of 8.2, UA negative, CXR without evidence of infection. After admission, blood cultures were repeated on 9/29 and 9/30 and are negative to date.       Patient had a T max of 100.2 and has normal white count. His renal function is normal.     TPN is being held but nutrition might start peripheral TPN if peripheral line can be placed (patient just lost his peripheral access).            Review of Systems:     CONSTITUTIONAL:  No fevers or chills  INTEGUMENTARY/SKIN: NEGATIVE for worrisome rashes, moles or lesions  EYES: Negative for icterus, vision changes or irritation  ENT/MOUTH:  Negative for oral lesions and sore throat  RESPIRATORY:  Negative for cough and dyspnea  CARDIOVASCULAR:  Negative for chest pain, palpitations and  shortness of breath  GASTROINTESTINAL:  Negative for abdominal pain, nausea, vomiting, diarrhea and constipation  GENITOURINARY:  Negative for dysuria, hematuria, frequency and urgency  MUSCULOSKELETAL: Negative for joint pain, swelling, motion restriction, negative for musculoskeletal pain  NEURO:  Negative for headache, altered mental status, numbness or weakness  PSYCHIATRIC: Negative for changes in mood or affect  HEMATOLOGIC/LYMPHATIC: negative for lymphadenopathy or bleeding  ALLERGIC/IMMUNOLOGIC: Negative for allergic reaction   ENDOCRINE: Negative for temperature intolerance, skin/hair changes       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)      Chronic abdominal pain      Complication of anesthesia      Difficulty swallowing       Gastric ulcer, unspecified as acute or chronic, without mention of hemorrhage, perforation, or obstruction      Gastro-oesophageal reflux disease      Generalized weakness 1/30/2018     Head injury      Hiatal hernia      Other bladder disorder      Other chronic pain      PONV (postoperative nausea and vomiting)      Severe malnutrition (H)     TPN     Short gut syndrome      Tobacco abuse          Allergies:      Allergies   Allergen Reactions     Bactrim [Sulfamethoxazole W/Trimethoprim] Rash     Penicillins Anaphylaxis     Please see Antimicrobial Management Team allergy assessment note 10/10/2018. Patient reported tolerating amoxicillin.     Doxycycline Rash     Vancomycin Rash     Rash after receiving vancomycin 3/28/16 (red man's?). Tolerated with slower infusion and diphenhydramine premed.          Family History:   Reviewed and noncontributory.   Family History   Problem Relation Age of Onset     Gastrointestinal Disease Mother         Crohns disease     Anxiety Disorder Mother      Thyroid Disease Mother         Grave's disease     Cancer Father         ear cancer-skin cancer/melanoma     Breast Cancer Maternal Grandmother      Macular Degeneration Maternal Grandfather      Anxiety Disorder Sister      Diabetes Maternal Uncle      Breast Cancer Other      Hypertension No family hx of      Hyperlipidemia No family hx of      Cerebrovascular Disease No family hx of      Prostate Cancer No family hx of      Depression No family hx of      Anesthesia Reaction No family hx of      Asthma No family hx of      Osteoporosis No family hx of      Genetic Disorder No family hx of      Obesity No family hx of      Mental Illness No family hx of      Substance Abuse No family hx of      Glaucoma No family hx of             Social History:     Social History     Socioeconomic History     Marital status:      Spouse name: Rose     Number of children: 1     Years of education: Not on file     Highest education  "level: Not on file   Occupational History     Not on file   Social Needs     Financial resource strain: Not on file     Food insecurity:     Worry: Not on file     Inability: Not on file     Transportation needs:     Medical: Not on file     Non-medical: Not on file   Tobacco Use     Smoking status: Current Some Day Smoker     Packs/day: 0.25     Years: 3.00     Pack years: 0.75     Types: Cigarettes     Last attempt to quit: 2018     Years since quittin.1     Smokeless tobacco: Never Used     Tobacco comment: I use an e cig every now and than   Substance and Sexual Activity     Alcohol use: No     Comment: quit      Drug use: No     Sexual activity: Yes     Partners: Female     Birth control/protection: None     Comment: no protection   Lifestyle     Physical activity:     Days per week: Not on file     Minutes per session: Not on file     Stress: Not on file   Relationships     Social connections:     Talks on phone: Not on file     Gets together: Not on file     Attends Mandaeism service: Not on file     Active member of club or organization: Not on file     Attends meetings of clubs or organizations: Not on file     Relationship status: Not on file     Intimate partner violence:     Fear of current or ex partner: Not on file     Emotionally abused: Not on file     Physically abused: Not on file     Forced sexual activity: Not on file   Other Topics Concern     Parent/sibling w/ CABG, MI or angioplasty before 65F 55M? No   Social History Narrative     Not on file              Physical Exam:   /87 (BP Location: Left arm)   Temp 100.2  F (37.9  C) (Oral)   Resp 18   Ht 1.803 m (5' 11\")   Wt 87.7 kg (193 lb 4.8 oz)   SpO2 97%   BMI 26.96 kg/m       Exam:  GENERAL:  Not in distress  HEAD: Normocephalic and atraumatic  ENT:  No hearing impairment, no ear pain or exudate, ear canals and TM's normal, nose and mouth without ulcers or lesions, oropharynx clear, oral mucous membranes moist, " notonsillar erythema and edema, oropharynx is without exudates or ulcers.  EYES:  Eyes grossly normal to inspection, PERRL and conjunctivae and sclerae normal   NECK:  Supple, no adenopathy, no asymmetry, masses, or scars and thyroid normal to palpation  LUNGS:  Clear to auscultation - no rales, rhonchi or wheezes  CARDIOVASCULAR:  Regular rate and rhythm, normal S1 S2, no S3 or S4, no murmur, click or rub, no peripheral edema and peripheral pulses strong  ABDOMEN:  Soft, nontender, no hepatosplenomegaly, no masses and bowel sounds normal  EXT: Extremities warm and without edema.  MS: No gross musculoskeletal defects noted, no edema  SKIN:  Cm catheter on the right upper chest and has no signs of inflammation  NEUROLOGIC:  Grossly nonfocal. Normal strength and tone, mentation intact and speech normal  PSYCHIATRIC: Mood stable, mentation appears normal, affect normal  HEMATOLOGIC/LYMPHATIC: No lymphadenopathy or bleeding           Laboratory Data:     Creatinine   Date Value Ref Range Status   09/30/2019 0.65 (L) 0.66 - 1.25 mg/dL Final   09/29/2019 0.78 0.66 - 1.25 mg/dL Final   09/10/2019 0.59 (L) 0.66 - 1.25 mg/dL Final   08/20/2019 0.66 0.66 - 1.25 mg/dL Final   08/13/2019 0.56 (L) 0.66 - 1.25 mg/dL Final     WBC   Date Value Ref Range Status   09/30/2019 7.9 4.0 - 11.0 10e9/L Final   09/29/2019 8.2 4.0 - 11.0 10e9/L Final   09/10/2019 7.2 4.0 - 11.0 10e9/L Final   08/13/2019 6.6 4.0 - 11.0 10e9/L Final   07/23/2019 6.8 4.0 - 11.0 10e9/L Final     Hemoglobin   Date Value Ref Range Status   09/30/2019 12.5 (L) 13.3 - 17.7 g/dL Final     Platelet Count   Date Value Ref Range Status   09/30/2019 142 (L) 150 - 450 10e9/L Final     Lab Results   Component Value Date     09/30/2019    BUN 10 09/30/2019    CO2 24 09/30/2019     CRP Inflammation   Date Value Ref Range Status   07/10/2019 <2.9 0.0 - 8.0 mg/L Final   06/28/2019 <2.9 0.0 - 8.0 mg/L Final   05/14/2019 5.7 0.0 - 8.0 mg/L Final   03/05/2019 <2.9 0.0  - 8.0 mg/L Final   08/20/2018 <2.9 0.0 - 8.0 mg/L Final           Pertinent Recent Microbiology Data:     Recent Labs   Lab 09/30/19  0610 09/29/19  1605 09/29/19  1559 09/27/19  1240 09/27/19  1225   CULT PENDING No growth after 13 hours No growth after 13 hours Cultured on the 1st day of incubation:  Gram positive cocci in pairs and chains  *  Critical Value/Significant Value, preliminary result only, called to and read back by  Dr Vyas on 9.28.19 at 1632 bw    Cultured on the 1st day of incubation:  Gram positive cocci in clusters  *  Critical Value/Significant Value, preliminary result only, called to and read back by  ankurd to [facility] on 9.28.19 at 2327 bw    Culture in progress  (Note)  POSITIVE for STREPTOCOCCUS SPECIES OTHER THAN pneumococcus, anginosus  group, S. pyogenes and S. agalactiae. Performed using [facility] [employer]  multiplex nucleic acid test. Final identification and antimicrobial  susceptibility testing will be verified by standard methods.    POSITIVE for STAPHYLOCOCCUS EPIDERMIDIS and POSITIVE for the mecA  gene (resistant to methicillin) by [employer] multiplex nucleic acid  test. Final identification and antimicrobial susceptibility testing  will be verified by standard methods.    Specimen tested with Verigene multiplex, gram-positive blood culture  nucleic acid test for the following targets: Staph aureus, Staph  epidermidis, Staph lugdunensis, other Staph species, Enterococcus  faecalis, Enterococcus faecium, Streptococcus species, S. agalactiae,  S. anginosus grp., S. pneumoniae, S. pyogenes, Listeria sp., mecA  (methicillin resistance) and Melvin/B (vancomycin resistance).    Critical Value/Significant Value called to and read back by Estelle GOOD 9.27.19 @ 0227 AV   No growth after 15 hours   SDES Blood Right Hand Blood Cm Blood Right Arm Blood Port Blood            Imaging:     Recent Results (from the past 48 hour(s))   XR Chest 2 Views    Narrative    XR CHEST 2 VW   9/29/2019 4:16 PM    History:  fever, TPN, + blood culture.     Comparison: Chest radiograph on 7/22/2019    Findings:   PA and the lateral view of the chest. Right central venous catheter  with the tip projects over cavoatrial junction. Cardiac silhouette is  within normal limits. Pulmonary vasculature is distinct. No acute  airspace opacities. No pneumothorax or significant pleural effusion.  Chronic mild blunting of left costophrenic angle. No acute osseous  abnormality.      Impression    IMPRESSION:  No acute cardiac pulmonary process.    I have personally reviewed the examination and initial interpretation  and I agree with the findings.    TIANNA FARMER MD

## 2019-10-01 ENCOUNTER — APPOINTMENT (OUTPATIENT)
Dept: INTERVENTIONAL RADIOLOGY/VASCULAR | Facility: CLINIC | Age: 49
DRG: 315 | End: 2019-10-01
Attending: PHYSICIAN ASSISTANT
Payer: COMMERCIAL

## 2019-10-01 ENCOUNTER — HOME INFUSION (PRE-WILLOW HOME INFUSION) (OUTPATIENT)
Dept: PHARMACY | Facility: CLINIC | Age: 49
End: 2019-10-01

## 2019-10-01 LAB
ALBUMIN SERPL-MCNC: 3.4 G/DL (ref 3.4–5)
ALP SERPL-CCNC: 87 U/L (ref 40–150)
ALT SERPL W P-5'-P-CCNC: 18 U/L (ref 0–70)
ANION GAP SERPL CALCULATED.3IONS-SCNC: 8 MMOL/L (ref 3–14)
AST SERPL W P-5'-P-CCNC: 14 U/L (ref 0–45)
BILIRUB SERPL-MCNC: 0.4 MG/DL (ref 0.2–1.3)
BUN SERPL-MCNC: 10 MG/DL (ref 7–30)
CALCIUM SERPL-MCNC: 8.5 MG/DL (ref 8.5–10.1)
CHLORIDE SERPL-SCNC: 107 MMOL/L (ref 94–109)
CO2 SERPL-SCNC: 26 MMOL/L (ref 20–32)
CREAT SERPL-MCNC: 0.66 MG/DL (ref 0.66–1.25)
ERYTHROCYTE [DISTWIDTH] IN BLOOD BY AUTOMATED COUNT: 13.8 % (ref 10–15)
GFR SERPL CREATININE-BSD FRML MDRD: >90 ML/MIN/{1.73_M2}
GLUCOSE BLDC GLUCOMTR-MCNC: 87 MG/DL (ref 70–99)
GLUCOSE SERPL-MCNC: 90 MG/DL (ref 70–99)
HCT VFR BLD AUTO: 39.2 % (ref 40–53)
HGB BLD-MCNC: 12.3 G/DL (ref 13.3–17.7)
INR PPP: 1.12 (ref 0.86–1.14)
MAGNESIUM SERPL-MCNC: 2.1 MG/DL (ref 1.6–2.3)
MCH RBC QN AUTO: 29.8 PG (ref 26.5–33)
MCHC RBC AUTO-ENTMCNC: 31.4 G/DL (ref 31.5–36.5)
MCV RBC AUTO: 95 FL (ref 78–100)
PHOSPHATE SERPL-MCNC: 3.7 MG/DL (ref 2.5–4.5)
PLATELET # BLD AUTO: 143 10E9/L (ref 150–450)
POTASSIUM SERPL-SCNC: 3.6 MMOL/L (ref 3.4–5.3)
PROT SERPL-MCNC: 6.7 G/DL (ref 6.8–8.8)
RBC # BLD AUTO: 4.13 10E12/L (ref 4.4–5.9)
SODIUM SERPL-SCNC: 141 MMOL/L (ref 133–144)
VANCOMYCIN SERPL-MCNC: 16.9 MG/L
WBC # BLD AUTO: 6.8 10E9/L (ref 4–11)

## 2019-10-01 PROCEDURE — 80202 ASSAY OF VANCOMYCIN: CPT | Performed by: PEDIATRICS

## 2019-10-01 PROCEDURE — 87040 BLOOD CULTURE FOR BACTERIA: CPT | Performed by: STUDENT IN AN ORGANIZED HEALTH CARE EDUCATION/TRAINING PROGRAM

## 2019-10-01 PROCEDURE — 83735 ASSAY OF MAGNESIUM: CPT | Performed by: PEDIATRICS

## 2019-10-01 PROCEDURE — 99233 SBSQ HOSP IP/OBS HIGH 50: CPT | Mod: GC | Performed by: INTERNAL MEDICINE

## 2019-10-01 PROCEDURE — 25000132 ZZH RX MED GY IP 250 OP 250 PS 637: Performed by: STUDENT IN AN ORGANIZED HEALTH CARE EDUCATION/TRAINING PROGRAM

## 2019-10-01 PROCEDURE — 0JPT3XZ REMOVAL OF TUNNELED VASCULAR ACCESS DEVICE FROM TRUNK SUBCUTANEOUS TISSUE AND FASCIA, PERCUTANEOUS APPROACH: ICD-10-PCS | Performed by: PHYSICIAN ASSISTANT

## 2019-10-01 PROCEDURE — 84100 ASSAY OF PHOSPHORUS: CPT | Performed by: PEDIATRICS

## 2019-10-01 PROCEDURE — 36589 REMOVAL TUNNELED CV CATH: CPT

## 2019-10-01 PROCEDURE — 00000146 ZZHCL STATISTIC GLUCOSE BY METER IP

## 2019-10-01 PROCEDURE — 12000001 ZZH R&B MED SURG/OB UMMC

## 2019-10-01 PROCEDURE — 87070 CULTURE OTHR SPECIMN AEROBIC: CPT | Performed by: PEDIATRICS

## 2019-10-01 PROCEDURE — 06PYX3Z REMOVAL OF INFUSION DEVICE FROM LOWER VEIN, EXTERNAL APPROACH: ICD-10-PCS | Performed by: PHYSICIAN ASSISTANT

## 2019-10-01 PROCEDURE — 25000125 ZZHC RX 250: Performed by: STUDENT IN AN ORGANIZED HEALTH CARE EDUCATION/TRAINING PROGRAM

## 2019-10-01 PROCEDURE — 25800030 ZZH RX IP 258 OP 636: Performed by: PEDIATRICS

## 2019-10-01 PROCEDURE — 25000128 H RX IP 250 OP 636: Performed by: PEDIATRICS

## 2019-10-01 PROCEDURE — 80053 COMPREHEN METABOLIC PANEL: CPT | Performed by: PEDIATRICS

## 2019-10-01 PROCEDURE — 25000131 ZZH RX MED GY IP 250 OP 636 PS 637: Performed by: STUDENT IN AN ORGANIZED HEALTH CARE EDUCATION/TRAINING PROGRAM

## 2019-10-01 PROCEDURE — 85610 PROTHROMBIN TIME: CPT | Performed by: PEDIATRICS

## 2019-10-01 PROCEDURE — 85027 COMPLETE CBC AUTOMATED: CPT | Performed by: STUDENT IN AN ORGANIZED HEALTH CARE EDUCATION/TRAINING PROGRAM

## 2019-10-01 PROCEDURE — 36415 COLL VENOUS BLD VENIPUNCTURE: CPT | Performed by: STUDENT IN AN ORGANIZED HEALTH CARE EDUCATION/TRAINING PROGRAM

## 2019-10-01 RX ORDER — HEPARIN SODIUM,PORCINE 10 UNIT/ML
2-5 VIAL (ML) INTRAVENOUS
Status: COMPLETED | OUTPATIENT
Start: 2019-10-01 | End: 2019-10-03

## 2019-10-01 RX ORDER — LIDOCAINE 40 MG/G
CREAM TOPICAL
Status: DISCONTINUED | OUTPATIENT
Start: 2019-10-01 | End: 2019-10-01

## 2019-10-01 RX ADMIN — LIDOCAINE HYDROCHLORIDE 30 ML: 20 SOLUTION ORAL; TOPICAL at 00:21

## 2019-10-01 RX ADMIN — SUCRALFATE 1 G: 1 SUSPENSION ORAL at 12:15

## 2019-10-01 RX ADMIN — SUCRALFATE 1 G: 1 SUSPENSION ORAL at 00:05

## 2019-10-01 RX ADMIN — VANCOMYCIN HYDROCHLORIDE 1250 MG: 10 INJECTION, POWDER, LYOPHILIZED, FOR SOLUTION INTRAVENOUS at 20:09

## 2019-10-01 RX ADMIN — ONDANSETRON 4 MG: 4 TABLET, ORALLY DISINTEGRATING ORAL at 22:27

## 2019-10-01 RX ADMIN — LIDOCAINE HYDROCHLORIDE 30 ML: 20 SOLUTION ORAL; TOPICAL at 12:22

## 2019-10-01 RX ADMIN — ONDANSETRON 4 MG: 4 TABLET, ORALLY DISINTEGRATING ORAL at 07:22

## 2019-10-01 RX ADMIN — LORAZEPAM 1 MG: 1 TABLET ORAL at 22:12

## 2019-10-01 RX ADMIN — OXYCODONE HYDROCHLORIDE 10 MG: 5 SOLUTION ORAL at 07:22

## 2019-10-01 RX ADMIN — DEXTROAMPHETAMINE SACCHARATE, AMPHETAMINE ASPARTATE, DEXTROAMPHETAMINE SULFATE AND AMPHETAMINE SULFATE 20 MG: 2.5; 2.5; 2.5; 2.5 TABLET ORAL at 08:22

## 2019-10-01 RX ADMIN — DIPHENHYDRAMINE HYDROCHLORIDE 25 MG: 25 SOLUTION ORAL at 01:42

## 2019-10-01 RX ADMIN — VANCOMYCIN HYDROCHLORIDE 1250 MG: 10 INJECTION, POWDER, LYOPHILIZED, FOR SOLUTION INTRAVENOUS at 12:15

## 2019-10-01 RX ADMIN — CARVEDILOL 6.25 MG: 6.25 TABLET, FILM COATED ORAL at 19:36

## 2019-10-01 RX ADMIN — VANCOMYCIN HYDROCHLORIDE 1250 MG: 10 INJECTION, POWDER, LYOPHILIZED, FOR SOLUTION INTRAVENOUS at 02:40

## 2019-10-01 RX ADMIN — FENTANYL 1 PATCH: 25 PATCH, EXTENDED RELEASE TRANSDERMAL at 22:12

## 2019-10-01 RX ADMIN — DIPHENHYDRAMINE HYDROCHLORIDE 25 MG: 25 SOLUTION ORAL at 11:22

## 2019-10-01 RX ADMIN — OXYCODONE HYDROCHLORIDE 10 MG: 5 SOLUTION ORAL at 22:12

## 2019-10-01 RX ADMIN — SUCRALFATE 1 G: 1 SUSPENSION ORAL at 08:21

## 2019-10-01 RX ADMIN — CARVEDILOL 6.25 MG: 6.25 TABLET, FILM COATED ORAL at 08:21

## 2019-10-01 RX ADMIN — DIPHENHYDRAMINE HYDROCHLORIDE 25 MG: 25 SOLUTION ORAL at 19:36

## 2019-10-01 ASSESSMENT — MIFFLIN-ST. JEOR: SCORE: 1778.03

## 2019-10-01 ASSESSMENT — ACTIVITIES OF DAILY LIVING (ADL)
ADLS_ACUITY_SCORE: 14

## 2019-10-01 ASSESSMENT — PAIN DESCRIPTION - DESCRIPTORS
DESCRIPTORS: ACHING;DISCOMFORT
DESCRIPTORS: DISCOMFORT

## 2019-10-01 NOTE — PROGRESS NOTES
GENERAL ID SERVICE: PROGRESS NOTE  Patient:  Parker Acevedo, Date of birth 1970, Medical record number [id number]  Date of Admission: 9/29/2019  Date of Visit:  10/1/2019         Assessment and Recommendations:   Problem List:    1. 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    2. Celestino-en-Y gastric bypass  esophagojejunostomy c/b short gut syndrome, and chronic malnutrition on TPN  3. History of recurrent polymicrobial bacteremia with several central line replacements in the past (always placed on the right side because of difficulty to place on the left), and more recently (Jan 2019) and episode of candidemia with line replacement.   Discussion:    Recommendations:    This is a 48 year-old male admitted on 9/29/19. He has  PMHx of Celestino-en-Y gastric bypass  esophagojejunostomy c/b short gut syndrome, and chronic malnutrition on TPN with recurrent polymicrobial bacteremia with several central line replacements in the past (always placed on the right side because of difficulty to place on the left), and more recently (Jan 2019) and episode of candidemia with line replacement. Patient more recently had a Cm catheter  placed on the right side on 8/7/19 for TPN continuation (16 hrs of infusion a day + 2 hrs of hydration). The most recent line was never infected in the past, although on 9/27 he developed fever while TPN was being infused through the Cm catheter. Blood cultures were obtained through in his house (through home care) and  blood culture from Cm was positive for Granulicatella adiacens and Staph Epidermidis resistant to oxacillin. Blood culture from periphery is negative from the same date.  Started on Vancomycin and Ceftriaxone in ED after discussion with ID.  After  admission, blood cultures were repeated on 9/29, 9/30 and 10/1 are negative to date.      The picture is very suggestive of catheter related infection.  Given the complex history of multiple line infections and failure of previous lock therapies, Cm catheter was removed (10/1). Patient currently has a peripheric line.  TTE was performed on 9/29 and did not show evidence of vegetation.          Recommendations:  1. Please continue Vancomycin. Appreciate pharmacy support to adjust vancomycin dose and monitor vancomycin trough level. Since TTE did not show evidence of endocarditis and the blood cultures cleared rapidly (in fact peripheric blood cultures never became positive, but just the initial blood culture from port), I would recommend 10 days of total treatment with vancomycin, counting from first negative blood culture. Last date of treatment 10/10/19. This is a hard stop.  2. I would recommend to obtain surveillance culture at 1 week (10/17/19) and 2 weeks (10/24/19) after the end of antimicrobial therapy  3. PICC line can be placed tomorrow and will likely be continued after antibiotics are stopped for TPN. Further discussion with the primary team managing TPN should happen in the future to further determine if he will need a new port in the future  4. While on antibiotics, please obtain at least weekly CBC, BMP  (daily CBC and BMP while in hospital). Patient will also need vancomycin trough level monitoring according to pharmacy protocol and pharmacy support for the duration of antimicrobial treatment. When he is in the outpatient setting, the results can faxed to the ID clinic () or to the patient's primary physician  5. I do not anticipate need for follow up in the Infectious Diseases clinic, unless the patient develops fever or any other sign of infection and/or if surveillance blood cultures come back positive  6. Patient asked about the possibility of attempting lock therapy in the new  "central line (PICC) although I could not find enough evidence in literature to support the prophylactic use of antibiotic lock therapy. I would suggest to discuss with IR if they have any protocol for using central line impregnated with antibiotic in patient with recurrent line infection.   7. I will sign off at this moment. Please see sign off recommendations above. Please page me ([phone]) if any question.  8. Granulicatela is usually susceptible to vancomycin. Please page me ([phone]) if susceptibility shows that the organism is not susceptible (very unlikely)      Sandra Horne MD  Date of Service: 10/01/19  Pager: [phone]          Interval History:     Events:  - No fever  - White count stable at 6.8    Micro:  Blood culture 9/27 Granulicatela adiacens and Staphylococcus epidermidis  Blood culture 9/29 and 9/30 NTD    ATB:  CTX started on 9/29  Vanco started on 9/29           Physical Exam:   /87 (BP Location: Left arm)   Pulse 76   Temp 97.6  F (36.4  C) (Oral)   Resp 20   Ht 1.803 m (5' 11\")   Wt 87.6 kg (193 lb 1.6 oz)   SpO2 97%   BMI 26.93 kg/m         Exam:  GENERAL:  Well-developed, well-nourished, not in acute distress.   HEAD: Normocephalic and atraumatic  ENT:  No hearing impairment, no ear pain or exudate, ear canals and TM's normal, nose and mouth without ulcers or lesions, oropharynx clear, oral mucous membranes moist, tonsillar erythema and edema, oropharynx is without exudates or ulcers.  EYES:  Eyes grossly normal to inspection, PERRL and conjunctivae and sclerae normal   NECK:  Supple, no adenopathy, no asymmetry, masses, or scars and thyroid normal to palpation  LUNGS:  Clear to auscultation - no rales, rhonchi or wheezes  CARDIOVASCULAR:  Regular rate and rhythm, normal S1 S2, no S3 or S4, no murmur, click or rub, no peripheral edema and peripheral pulses strong  ABDOMEN:  Soft, nontender, no hepatosplenomegaly, no masses and bowel sounds normal  EXT: Extremities warm and without " edema.  MS: No gross musculoskeletal defects noted, no edema  SKIN:  No acute rashes or suspicious lesions  NEUROLOGIC:  Grossly nonfocal. Normal strength and tone, mentation intact and speech normal  PSYCHIATRIC: Mood stable, mentation appears normal, affect normal  HEMATOLOGIC/LYMPHATIC: No lymphadenopathy or bleeding           Laboratory Data:     Creatinine   Date Value Ref Range Status   10/01/2019 0.66 0.66 - 1.25 mg/dL Final   09/30/2019 0.65 (L) 0.66 - 1.25 mg/dL Final   09/29/2019 0.78 0.66 - 1.25 mg/dL Final   09/10/2019 0.59 (L) 0.66 - 1.25 mg/dL Final   08/20/2019 0.66 0.66 - 1.25 mg/dL Final     WBC   Date Value Ref Range Status   10/01/2019 6.8 4.0 - 11.0 10e9/L Final   09/30/2019 7.9 4.0 - 11.0 10e9/L Final   09/29/2019 8.2 4.0 - 11.0 10e9/L Final   09/10/2019 7.2 4.0 - 11.0 10e9/L Final   08/13/2019 6.6 4.0 - 11.0 10e9/L Final     Hemoglobin   Date Value Ref Range Status   10/01/2019 12.3 (L) 13.3 - 17.7 g/dL Final     Platelet Count   Date Value Ref Range Status   10/01/2019 143 (L) 150 - 450 10e9/L Final     Lab Results   Component Value Date     10/01/2019    BUN 10 10/01/2019    CO2 26 10/01/2019     CRP Inflammation   Date Value Ref Range Status   07/10/2019 <2.9 0.0 - 8.0 mg/L Final   06/28/2019 <2.9 0.0 - 8.0 mg/L Final   05/14/2019 5.7 0.0 - 8.0 mg/L Final   03/05/2019 <2.9 0.0 - 8.0 mg/L Final   08/20/2018 <2.9 0.0 - 8.0 mg/L Final           Pertinent Recent Microbiology Data:     Recent Labs   Lab 09/30/19  0610 09/29/19  1605 09/29/19  1559 09/27/19  1240 09/27/19  1225   CULT No growth after 23 hours No growth after 2 days No growth after 2 days Cultured on the 1st day of incubation:  Granulicatella adiacens  *  Critical Value/Significant Value, preliminary result only, called to and read back by  Dr Vyas on 9.28.19 at 1632 bw    Cultured on the 1st day of incubation:  Gram positive cocci in clusters  *  Critical Value/Significant Value, preliminary result only, called to and  read back by  paged to [facility] on 9.28.19 at 2327 bw    Culture in progress  (Note)  POSITIVE for STREPTOCOCCUS SPECIES OTHER THAN pneumococcus, anginosus  group, S. pyogenes and S. agalactiae. Performed using [facility]  multiplex nucleic acid test. Final identification and antimicrobial  susceptibility testing will be verified by standard methods.    POSITIVE for STAPHYLOCOCCUS EPIDERMIDIS and POSITIVE for the mecA  gene (resistant to methicillin) by [employer] multiplex nucleic acid  test. Final identification and antimicrobial susceptibility testing  will be verified by standard methods.    Specimen tested with [facility] multiplex, gram-positive blood culture  nucleic acid test for the following targets: Staph aureus, Staph  epidermidis, Staph lugdunensis, other Staph species, Enterococcus  faecalis, Enterococcus faecium, Streptococcus species, S. agalactiae,  S. anginosus grp., S. pneumoniae, S. pyogenes, Listeria sp., mecA  (methicillin resistance) and Melvin/B (vancomycin resistance).    Critical Value/Significant Value called to and read back by Estelle GOOD 9.27.19 @ 0227 AV   No growth after 2 days   SDES Blood Right Hand Blood Cm Blood Right Arm Blood Port Blood            Imaging:     Recent Results (from the past 48 hour(s))   XR Chest 2 Views    Narrative    XR CHEST 2 VW  9/29/2019 4:16 PM    History:  fever, TPN, + blood culture.     Comparison: Chest radiograph on 7/22/2019    Findings:   PA and the lateral view of the chest. Right central venous catheter  with the tip projects over cavoatrial junction. Cardiac silhouette is  within normal limits. Pulmonary vasculature is distinct. No acute  airspace opacities. No pneumothorax or significant pleural effusion.  Chronic mild blunting of left costophrenic angle. No acute osseous  abnormality.      Impression    IMPRESSION:  No acute cardiac pulmonary process.    I have personally reviewed the examination and initial interpretation  and I  agree with the findings.    TIANNA FARMER MD

## 2019-10-01 NOTE — PLAN OF CARE
VS stable, afebrile. Pt c/o stomach ulcers - GI cocktail given once with relief. Fentanyl patch in place as ordered. Pt denied SOB/n/v. Premedicated vanco with benadryl, infused through PIV without concern. Pt NPO, hopeful for azevedo removal today. Billy-Key drain at bedside, pt did not drain Jtube - stated he will if he feels more bloated/uncomfortable. No significant events this shift, continue POC.    Per Mobility Level Guideline;  Bed/chair alarm No  Patient may ambulate Independently  Patient requires the following assistive equipment: IV pole for support  PT/OT consult orders in place No

## 2019-10-01 NOTE — PLAN OF CARE
"Neuro: A&Ox4.   Cardiac: VSS. Afebrile this shift. BP (!) 145/96   Pulse 76   Temp 97.6  F (36.4  C) (Oral)   Resp 20   Ht 1.803 m (5' 11\")   Wt 87.6 kg (193 lb 1.6 oz)   SpO2 100%   BMI 26.93 kg/m      Respiratory: Sating >92% on RA.  GI/: Adequate urine output, pt not saving per his request. No BM, pt declined bowel medications.  Diet/appetite: Pt on regular diet, ate bites of lunch. Pt got PRN Zofran at 7:30AM. Pt declined TPN today, wants to start tonight.  Activity:  Independent, pt goes outside with family numerous times throughout the day. Pt cutting off ends of cigarettes and smoking outside. MD updated, no changes.  Pain: At acceptable level on current regimen.   Skin: No new deficits noted.  LDA's: left tunneled CVC removed by IR, orders for PICC to be placed.    Plan: Continue with POC, PICC placement today. Notify primary team with changes.   "

## 2019-10-01 NOTE — PROGRESS NOTES
Pt with Gram + bacteremia from cm. VSS. DL cm redressed and vanco locked. R fa piv patent. ID consult pending for removal of Cm. Oxy, ativan and zofran HS. Fentanyl patch in place on R arm. McKey G tube in place. Was to start PPN this evening but he refused and would like to start tomorrow. L fa extravasation site slightly edematous but improving.

## 2019-10-01 NOTE — PHARMACY-VANCOMYCIN DOSING SERVICE
Pharmacy Vancomycin Note  Date of Service 2019  Patient's  1970   48 year old, male    Indication: Bacteremia  Goal Trough Level: 10-15 mg/L  Day of Therapy: 3  Current Vancomycin regimen: 1250 mg IV q8h    Current estimated CrCl = Estimated Creatinine Clearance: 173.3 mL/min (based on SCr of 0.66 mg/dL).    Creatinine for last 3 days  2019:  3:59 PM Creatinine 0.78 mg/dL  2019:  6:10 AM Creatinine 0.65 mg/dL  10/1/2019:  8:19 AM Creatinine 0.66 mg/dL    Recent Vancomycin Levels (past 3 days)  10/1/2019:  8:19 AM Vancomycin Level 16.9 mg/L    Vancomycin IV Administrations (past 72 hours)                   vancomycin 1250 mg in 0.9% NaCl 250 mL intermittent infusion 1,250 mg (mg) 1,250 mg Given 10/01/19 0240     1,250 mg Given 19 1820     1,250 mg Given  08     1,250 mg Given  0102    vancomycin (VANCOCIN) 2.5 mg/mL, heparin (porcine) 100 Units/mL in sodium chloride 0.9 % 3 mL Antimicrobial Catheter Lock Therapy ()  Given 19 2212    vancomycin (VANCOCIN) 2,250 mg in sodium chloride 0.9 % 500 mL intermittent infusion (mg) 2,250 mg New Bag 19 1818                Nephrotoxins and other renal medications (From now, onward)    Start     Dose/Rate Route Frequency Ordered Stop    19 0030  vancomycin 1250 mg in 0.9% NaCl 250 mL intermittent infusion 1,250 mg      1,250 mg  over 120 Minutes Intravenous EVERY 8 HOURS 19 1615               Contrast Orders - past 72 hours (72h ago, onward)    None          Interpretation of levels and current regimen:  Trough level is  Therapeutic level represents approximate 5 hour level, a true 8 hour trough expected to be around 15    Has serum creatinine changed > 50% in last 72 hours: No    Urine output: unable to determine    Renal Function: Stable    Plan:  1.  Continue Current Dose  2.  Pharmacy will check trough levels as appropriate in 1-3 Days.    3. Serum creatinine levels will be ordered daily for the first week of therapy  and at least twice weekly for subsequent weeks.      Chrissie Freeman        .

## 2019-10-01 NOTE — PROGRESS NOTES
Resident/Fellow Attestation   I, Astrid Nesbitt, was present with the medical student who participated in the service and in the documentation of the note.  I have verified the history and personally performed the physical exam and medical decision making.  I agree with the assessment and plan of care as documented in the note.      48 year old male with PMH of Celestino-en-Y gastric bypass complicated by marginal ulceration requiring multiple revisions subsequently requring lap corey, gastrojejunostomy, gastrectomy, appendectomy, chronic abdominal pain on opioids, PUD, severe malnutrition, short gut syndrome on chronic TPN with history of recurrent bacteremia who presents to ED with positive blood cultures with Staphylococcus epidermidis and Granulicatela adiacens. Originally started on Vancomycin and Ceftriaxone. Ceftriaxone discontinued 10/1 per ID. TTE without evidence of vegetation. No need for RONEL due to negative blood cultures and no Staph Aureus. Patient had Cm removed 10/1. Will have PICC line placed in order to be able to receive TPN on 10/2.       Astrid Nesbitt DO  PGY2  Date of Service (when I saw the patient): 10/01/19      [facility], [place]    Progress Note - Maroon 1 Service        Date of Admission:  9/29/2019    Assessment & Plan   Parker Acevedo is a 48 year old male with PMH of Celestino-en-Y gastric bypass complicated by marginal ulceration requiring multiple revisions subsequently requring lap corey, gastrojejunostomy, gastrectomy, appendectomy, chronic abdominal pain on opioids, PUD, severe malnutrition, short gut syndrome on chronic TPN with history of recurrent bacteremia. Admitted on 9/29/19 with Streptococcus, Granulicatella adiacens, and Staph Epidermidis bacteremia; treated currently with vancomycin and ceftriaxone.      Bacteremia: Streptococcus, Granulicatella adiacens, and Staph Epidermidis  Patient with fevers over the past few days. Called home  health nurse who tata blood cultures on 9/27 which were positive for Streptococcus and Staph Epidermidis, later positive for granulicatella adiacens on day 4 of culture. Daily BC x4days have all been negative Started on Vancomycin and Ceftriaxone in ED after discussion with ID. Patient had WBC of 8.2, UA negative, CXR without evidence of infection on admission. Concern that patient's gram positive bacteremia is secondary to Cm, removed by IR 10/1. TTE without evidence of vegetation; no plan to do RONEL given neg BC since, and strep vegetations usually found with TTE.     - ID consult    - Continue Vancomycin (x7days, discontinue 10/6) and Ceftriaxone (x3days, discontinue 10/2)   - Hold off Vancomycin lock therapy for PICC for now, considering per ID consult recommendation  - Benadryl prior to Vancomycin infusion (due to hx of Tom Syndrome)   - Daily blood cultures      Chronic pain  - Continue PTA fentanyl 25mcg/hr q48h   - Continue lidocaine patches  - Increase prn oxycodone to TID       Hx of Celestino-en-Y gastric bypass c/b short gut syndrome requiring TPN   - Nutrition consult, appreciate recommendations   - PPN through peripheral IV today, plan for IR to place PICC (not midline b/c vanco) tomorrow    Acute Anemia  Thrombocytopenia  Today: Hgb 12.3, platelets 14; both relatively stable since yesterday. On admission: Hgb 13.8, platelet 143. No evidence of bleeding, will continue to monitor.   -Continue to trend CBC     ADHD  - Continue Adderall    HTN  One episode of HTN (145/96) on 10/1. All PTA hypertensive medications continued since admission. Will continue to monitor BP.   - Continue Carvedilol 6.25 mg     Diet: Regular Diet Adult  NPO for Medical/Clinical Reasons Except for: Meds, Ice Chips  parenteral nutrition - Clinimix E    Fluids: None   DVT Prophylaxis: Pneumatic Compression Devices  Sanchez Catheter: Not present  Code Status: Full Code       Disposition Plan     Expected discharge: 2 - 3 days,  recommended to prior living arrangement once bacteremia resolved.  Entered: Astrid Nesbitt MD 09/29/2019, 10:58 PM        The patient's care was discussed with the Attending Physician, Dr. Faulkner.     Astrid Nesbitt DO [place] 1 Service  [facility], [place]  Pager: [phone]  Please see sticky note for cross cover information     ______________________________________________________________________    Interval History   Tmax 98.2. No acute events overnight. Patient feels well this morning. Reports continued generalized abdominal pain and nausea, unchanged from baseline. Pertinent negatives: no chest pain, SOB, fever/chills, HA, emesis, rashes. Reports peripheral neuropathy and back pain are chronic and unchanged from baseline; back pain relieved w/ lidocaine patch.      Data reviewed today: I reviewed all medications, new labs and imaging results over the last 24 hours.     Physical Exam   Vital Signs: Temp: 97.6  F (36.4  C) Temp src: Oral BP: (!) 145/96 Pulse: 76 Heart Rate: 65 Resp: 20 SpO2: 100 % O2 Device: None (Room air)    Weight: 193 lbs 1.6 oz  General: Middle aged gentleman sitting upright on edge of bed in no acute distress.   HEENT: Normocephalic, atraumatic, adentulous. No oral lesions.   Heart: RRR. no rubs, murmurs, or gallops   Lungs: CTAB no wheezes, rhonchi  Abdomen: Lots of loose abdominal skin, midline abdominal scar, Jim port intact not leaking: non-erythematous, and non-tender. Non-distended, bowel sounds heard in all 4 quadrants, no pain to palpation, no rigidity/guarding.  Skin: Cm catheter in place in right upper chest: not leaking, non-erythematous, and non-tender.     Data   Recent Labs   Lab 10/01/19  0819 09/30/19  0610 09/29/19  1559   WBC 6.8 7.9 8.2   HGB 12.3* 12.5* 13.8   MCV 95 96 95   * 142* 169   INR 1.12  --   --     144 143   POTASSIUM 3.6 3.7 3.8   CHLORIDE 107 112* 107   CO2 26 24 32   BUN 10 10 10   CR 0.66  0.65* 0.78   ANIONGAP 8 8 4   SILAS 8.5 8.2* 8.5   GLC 90 95 89   ALBUMIN 3.4  --  3.6   PROTTOTAL 6.7*  --  7.2   BILITOTAL 0.4  --  0.4   ALKPHOS 87  --  96   ALT 18  --  18   AST 14  --  8   LIPASE  --   --  58*     TTE (9/30/19):   -No vegetations or masses (doesn't exclude endocarditis).

## 2019-10-01 NOTE — CONSULTS
INTERVENTIONAL RADIOLOGY CONSULT    Parker Acevedo is a 48 year old male who is TPN dependent via right IJ single-lumen tunneled central venous catheter which was placed on 8/7/2019.  The patient now has catheter related bloodstream infection (Streptococcus and staph epidermidis bacteremia) so IR has been consulted for tunneled line removal.    Patient is on IR schedule today, 10/1/2019 for a right tunneled line removal.  Primary team would like catheter tip sent for culture.  Labs WNL for procedure. No NPO required. Consent will be done prior to procedure.     Discussed with Astrid Nesbitt MD.    Jhonatan Alejandro PA-C  Interventional Radiology  [phone]

## 2019-10-01 NOTE — PROCEDURES
Interventional Radiology Brief Post Procedure Note    Procedure: IR CVC TUNNEL REMOVAL RIGHT    Proceduralist: Per Dumont PA-C    Assistant: None    Time Out: Prior to the start of the procedure and with procedural staff participation, I verbally confirmed the patient s identity using two indicators, relevant allergies, that the procedure was appropriate and matched the consent or emergent situation, and that the correct equipment/implants were available. Immediately prior to starting the procedure I conducted the Time Out with the procedural staff and re-confirmed the patient s name, procedure, and site/side. (The Joint Commission universal protocol was followed.)  Yes    Medications   Medication Event Details Admin User Admin Time       Sedation: None. Local Anesthestic used    Findings: Completed removal of 5 Amharic single lumen tunneled central venous catheter via right IJ. Catheter removed in its entirety. Patient tolerated the procedure well.     Estimated Blood Loss: Minimal      SPECIMENS: catheter tip for culture    Complications: 1. None     Condition: Stable    Plan: Follow-up per primary team.     Comments: See dictated procedure note for full details.    Per Dumont PA-C

## 2019-10-01 NOTE — TELEPHONE ENCOUNTER
Patient in the hospital.  Have paged the team, awaiting return call.  Will need to coordinate plan before discharge.    Jessica Milligan MD  [facility]

## 2019-10-02 ENCOUNTER — APPOINTMENT (OUTPATIENT)
Dept: PHYSICAL THERAPY | Facility: CLINIC | Age: 49
DRG: 315 | End: 2019-10-02
Payer: COMMERCIAL

## 2019-10-02 ENCOUNTER — APPOINTMENT (OUTPATIENT)
Dept: GENERAL RADIOLOGY | Facility: CLINIC | Age: 49
DRG: 315 | End: 2019-10-02
Payer: COMMERCIAL

## 2019-10-02 ENCOUNTER — ANESTHESIA EVENT (OUTPATIENT)
Dept: SURGERY | Facility: CLINIC | Age: 49
DRG: 315 | End: 2019-10-02
Payer: COMMERCIAL

## 2019-10-02 LAB
ANION GAP SERPL CALCULATED.3IONS-SCNC: 7 MMOL/L (ref 3–14)
BUN SERPL-MCNC: 12 MG/DL (ref 7–30)
CALCIUM SERPL-MCNC: 8.3 MG/DL (ref 8.5–10.1)
CHLORIDE SERPL-SCNC: 108 MMOL/L (ref 94–109)
CO2 SERPL-SCNC: 27 MMOL/L (ref 20–32)
CREAT SERPL-MCNC: 0.76 MG/DL (ref 0.66–1.25)
ERYTHROCYTE [DISTWIDTH] IN BLOOD BY AUTOMATED COUNT: 13.6 % (ref 10–15)
GFR SERPL CREATININE-BSD FRML MDRD: >90 ML/MIN/{1.73_M2}
GLUCOSE BLDC GLUCOMTR-MCNC: 120 MG/DL (ref 70–99)
GLUCOSE BLDC GLUCOMTR-MCNC: 84 MG/DL (ref 70–99)
GLUCOSE BLDC GLUCOMTR-MCNC: 90 MG/DL (ref 70–99)
GLUCOSE SERPL-MCNC: 88 MG/DL (ref 70–99)
HCT VFR BLD AUTO: 37.5 % (ref 40–53)
HGB BLD-MCNC: 12 G/DL (ref 13.3–17.7)
MAGNESIUM SERPL-MCNC: 2.3 MG/DL (ref 1.6–2.3)
MCH RBC QN AUTO: 29.9 PG (ref 26.5–33)
MCHC RBC AUTO-ENTMCNC: 32 G/DL (ref 31.5–36.5)
MCV RBC AUTO: 94 FL (ref 78–100)
PHOSPHATE SERPL-MCNC: 4.4 MG/DL (ref 2.5–4.5)
PLATELET # BLD AUTO: 141 10E9/L (ref 150–450)
POTASSIUM SERPL-SCNC: 3.4 MMOL/L (ref 3.4–5.3)
RBC # BLD AUTO: 4.01 10E12/L (ref 4.4–5.9)
SODIUM SERPL-SCNC: 142 MMOL/L (ref 133–144)
TROPONIN I SERPL-MCNC: <0.015 UG/L (ref 0–0.04)
TROPONIN I SERPL-MCNC: <0.015 UG/L (ref 0–0.04)
WBC # BLD AUTO: 6.2 10E9/L (ref 4–11)

## 2019-10-02 PROCEDURE — 27211417 ZZ H KIT, VPS RHYTHM STYLET

## 2019-10-02 PROCEDURE — 84100 ASSAY OF PHOSPHORUS: CPT | Performed by: PEDIATRICS

## 2019-10-02 PROCEDURE — 84484 ASSAY OF TROPONIN QUANT: CPT | Performed by: STUDENT IN AN ORGANIZED HEALTH CARE EDUCATION/TRAINING PROGRAM

## 2019-10-02 PROCEDURE — 80048 BASIC METABOLIC PNL TOTAL CA: CPT | Performed by: STUDENT IN AN ORGANIZED HEALTH CARE EDUCATION/TRAINING PROGRAM

## 2019-10-02 PROCEDURE — 12000001 ZZH R&B MED SURG/OB UMMC

## 2019-10-02 PROCEDURE — 00000146 ZZHCL STATISTIC GLUCOSE BY METER IP

## 2019-10-02 PROCEDURE — 25000132 ZZH RX MED GY IP 250 OP 250 PS 637: Performed by: STUDENT IN AN ORGANIZED HEALTH CARE EDUCATION/TRAINING PROGRAM

## 2019-10-02 PROCEDURE — C1751 CATH, INF, PER/CENT/MIDLINE: HCPCS

## 2019-10-02 PROCEDURE — 36569 INSJ PICC 5 YR+ W/O IMAGING: CPT

## 2019-10-02 PROCEDURE — 25000128 H RX IP 250 OP 636: Performed by: PEDIATRICS

## 2019-10-02 PROCEDURE — 36415 COLL VENOUS BLD VENIPUNCTURE: CPT | Performed by: PEDIATRICS

## 2019-10-02 PROCEDURE — 97530 THERAPEUTIC ACTIVITIES: CPT | Mod: GP

## 2019-10-02 PROCEDURE — 36415 COLL VENOUS BLD VENIPUNCTURE: CPT | Performed by: STUDENT IN AN ORGANIZED HEALTH CARE EDUCATION/TRAINING PROGRAM

## 2019-10-02 PROCEDURE — 25000125 ZZHC RX 250: Performed by: STUDENT IN AN ORGANIZED HEALTH CARE EDUCATION/TRAINING PROGRAM

## 2019-10-02 PROCEDURE — 25800030 ZZH RX IP 258 OP 636: Performed by: INTERNAL MEDICINE

## 2019-10-02 PROCEDURE — 83735 ASSAY OF MAGNESIUM: CPT | Performed by: PEDIATRICS

## 2019-10-02 PROCEDURE — 25000128 H RX IP 250 OP 636: Performed by: INTERNAL MEDICINE

## 2019-10-02 PROCEDURE — 97162 PT EVAL MOD COMPLEX 30 MIN: CPT | Mod: GP

## 2019-10-02 PROCEDURE — 85027 COMPLETE CBC AUTOMATED: CPT | Performed by: STUDENT IN AN ORGANIZED HEALTH CARE EDUCATION/TRAINING PROGRAM

## 2019-10-02 PROCEDURE — 99232 SBSQ HOSP IP/OBS MODERATE 35: CPT | Mod: GC | Performed by: INTERNAL MEDICINE

## 2019-10-02 PROCEDURE — 93005 ELECTROCARDIOGRAM TRACING: CPT

## 2019-10-02 PROCEDURE — 25800030 ZZH RX IP 258 OP 636: Performed by: PEDIATRICS

## 2019-10-02 PROCEDURE — 40000986 XR CHEST PORT 1 VW

## 2019-10-02 PROCEDURE — 25800030 ZZH RX IP 258 OP 636: Performed by: STUDENT IN AN ORGANIZED HEALTH CARE EDUCATION/TRAINING PROGRAM

## 2019-10-02 RX ORDER — SODIUM CHLORIDE, SODIUM LACTATE, POTASSIUM CHLORIDE, CALCIUM CHLORIDE 600; 310; 30; 20 MG/100ML; MG/100ML; MG/100ML; MG/100ML
INJECTION, SOLUTION INTRAVENOUS CONTINUOUS
Status: DISPENSED | OUTPATIENT
Start: 2019-10-02 | End: 2019-10-03

## 2019-10-02 RX ORDER — DIPHENHYDRAMINE HCL 12.5MG/5ML
25 LIQUID (ML) ORAL 2 TIMES DAILY
Status: DISCONTINUED | OUTPATIENT
Start: 2019-10-03 | End: 2019-10-04 | Stop reason: HOSPADM

## 2019-10-02 RX ORDER — DIPHENHYDRAMINE HCL 12.5MG/5ML
25 LIQUID (ML) ORAL 2 TIMES DAILY
Status: DISCONTINUED | OUTPATIENT
Start: 2019-10-02 | End: 2019-10-02

## 2019-10-02 RX ADMIN — SUCRALFATE 1 G: 1 SUSPENSION ORAL at 21:58

## 2019-10-02 RX ADMIN — VANCOMYCIN HYDROCHLORIDE 1500 MG: 10 INJECTION, POWDER, LYOPHILIZED, FOR SOLUTION INTRAVENOUS at 17:39

## 2019-10-02 RX ADMIN — DEXTROAMPHETAMINE SACCHARATE, AMPHETAMINE ASPARTATE, DEXTROAMPHETAMINE SULFATE AND AMPHETAMINE SULFATE 20 MG: 2.5; 2.5; 2.5; 2.5 TABLET ORAL at 08:19

## 2019-10-02 RX ADMIN — LIDOCAINE HYDROCHLORIDE 1 ML: 10 INJECTION, SOLUTION INFILTRATION; PERINEURAL at 16:28

## 2019-10-02 RX ADMIN — CARVEDILOL 6.25 MG: 6.25 TABLET, FILM COATED ORAL at 17:09

## 2019-10-02 RX ADMIN — SUCRALFATE 1 G: 1 SUSPENSION ORAL at 11:17

## 2019-10-02 RX ADMIN — CARVEDILOL 6.25 MG: 6.25 TABLET, FILM COATED ORAL at 08:18

## 2019-10-02 RX ADMIN — POLYETHYLENE GLYCOL 3350 17 G: 17 POWDER, FOR SOLUTION ORAL at 08:18

## 2019-10-02 RX ADMIN — LORAZEPAM 1 MG: 1 TABLET ORAL at 21:58

## 2019-10-02 RX ADMIN — DIPHENHYDRAMINE HYDROCHLORIDE 25 MG: 25 SOLUTION ORAL at 03:32

## 2019-10-02 RX ADMIN — SUCRALFATE 1 G: 1 SUSPENSION ORAL at 15:36

## 2019-10-02 RX ADMIN — VANCOMYCIN HYDROCHLORIDE 1250 MG: 10 INJECTION, POWDER, LYOPHILIZED, FOR SOLUTION INTRAVENOUS at 04:01

## 2019-10-02 RX ADMIN — OXYCODONE HYDROCHLORIDE 10 MG: 5 SOLUTION ORAL at 04:01

## 2019-10-02 RX ADMIN — DIPHENHYDRAMINE HYDROCHLORIDE 25 MG: 25 SOLUTION ORAL at 17:09

## 2019-10-02 RX ADMIN — OXYCODONE HYDROCHLORIDE 10 MG: 5 SOLUTION ORAL at 21:58

## 2019-10-02 RX ADMIN — SUCRALFATE 1 G: 1 SUSPENSION ORAL at 04:10

## 2019-10-02 RX ADMIN — LIDOCAINE HYDROCHLORIDE 30 ML: 20 SOLUTION ORAL; TOPICAL at 06:53

## 2019-10-02 RX ADMIN — SODIUM CHLORIDE, POTASSIUM CHLORIDE, SODIUM LACTATE AND CALCIUM CHLORIDE: 600; 310; 30; 20 INJECTION, SOLUTION INTRAVENOUS at 14:10

## 2019-10-02 ASSESSMENT — ACTIVITIES OF DAILY LIVING (ADL)
ADLS_ACUITY_SCORE: 13
ADLS_ACUITY_SCORE: 13
ADLS_ACUITY_SCORE: 14

## 2019-10-02 ASSESSMENT — PAIN DESCRIPTION - DESCRIPTORS
DESCRIPTORS: SHARP;SHOOTING
DESCRIPTORS: ACHING;SHARP;DISCOMFORT

## 2019-10-02 ASSESSMENT — MIFFLIN-ST. JEOR: SCORE: 1778.03

## 2019-10-02 NOTE — PLAN OF CARE
PT 7D: Evaluation completed and treatment initiated.   Discharge Planner PT   Patient plan for discharge: Home with assist  Current status: PT consulted for falls and weakness.  After interview and exam, it appears that pt's falls are related to low fluid status/orthostasis at home 2/2 difficulty with intake and absorption.  He is ambulating outside to smoke here - did discuss briefly in the impact of smoking on BP and HR regulation as well.  Provided a lot of education to pt and wife on how to monitor for orthostasis and how to manage the symptoms at home to reduce falls risk.  Barriers to return to prior living situation: Medical status  Recommendations for discharge: Home with assist, increase awareness of hypotensive symptoms  Rationale for recommendations: Given pt's difficulty with fluid intake, orthostasis will continue to be an issue he will have to manage therefore educated heavily on this.  He is otherwise independent and getting in enough activity on his own in the hospital.  PT to sign off - no further needs identified.          Entered by: Kristin Bain 10/02/2019 2:29 PM

## 2019-10-02 NOTE — PROGRESS NOTES
Resident/Fellow Attestation   I, Astrid Nesbitt, was present with the medical student who participated in the service and in the documentation of the note.  I have verified the history and personally performed the physical exam and medical decision making.  I agree with the assessment and plan of care as documented in the note.      48 year old male with PMH of Celestino-en-Y gastric bypass complicated by marginal ulceration requiring multiple revisions subsequently requring lap corey, gastrojejunostomy, gastrectomy, appendectomy, chronic abdominal pain on opioids, PUD, severe malnutrition, short gut syndrome on chronic TPN with history of recurrent catheter-associated bacteremia who presents to ED with positive blood cultures with Staphylococcus epidermidis and Granulicatela adiacens. Originally started on Vancomycin and Ceftriaxone. Ceftriaxone discontinued 10/1 per ID. TTE without evidence of vegetation. No need for RONEL due to negative blood cultures and no Staph Aureus. Patient had Cm removed 10/1. Will have PICC line placed in order to be able to receive TPN (10/2).     The patient is having symptoms of atypical and typical chest pain. ECG without concern for ACS, troponin negative x 2. Will get cardiology consult to evaluate need for stress test.     Pt will also have EGD performed by bariatric surgeon tomorrow. Will make NPO at midnight.      Astrid Nesbitt, DO  PGY2  Date of Service (when I saw the patient): 10/02/19    [facility], [place]    Progress Note - [place] 1 Service        Date of Admission:  9/29/2019    Assessment & Plan   Parker Acevedo is a 48 year old male with PMH of Celestino-en-Y gastric bypass complicated by marginal ulceration requiring multiple revisions subsequently requring lap corey, gastrojejunostomy, gastrectomy, appendectomy, chronic abdominal pain on opioids, PUD, severe malnutrition, short gut syndrome on chronic TPN with history of recurrent  bacteremia. Admitted on 9/29/19 with Streptococcus, Granulicatella adiacens, and Staph Epidermidis bacteremia; treated with vancomycin and a completed course of ceftriaxone.      Central line-associated bacteremia with Streptococcus, Granulicatella adiacens, and Staph Epidermidis  Patient with fevers over the few days PTA. Called home health nurse who tata blood cultures on 9/27 which were positive for Streptococcus and Staph Epidermidis, later positive for granulicatella adiacens on day 4 of culture. Daily blood cultures x4days since have all been negative. Started on Vancomycin and Ceftriaxone in ED after discussion with ID. Patient had WBC of 8.2, UA negative, CXR without evidence of infection on admission. Concerned patient's gram positive bacteremia was secondary to Cm, removed by IR 10/1. TTE without evidence of vegetation; no plan to do RONEL given neg BC since, and strep vegetations usually found with TTE.   - ID consult    - Continue Vancomycin ([id number], discontinue 10/10); discontinue Ceftriaxone (completed course x3days)   - Hold off Vancomycin lock therapy for PICC for now, considering per IR recommendation. ID did not find evidence to support this.   - Obtain surveillance culture at 1 wk (10/17) and 2 wk (10/24) at end of antimicrobial therapy   - Monitor vanco trough level for duration of antimicrobial treatment. When he is in the outpatient setting, the results can faxed to the ID clinic () or to the patient's primary physician  - Changed dosing of Vanco to 1500 mg BID  - Benadryl prior to Vancomycin infusion (due to hx of Tom Syndrome)   - Daily blood cultures   - Culture of catheter tip pending.     Chest Pain, SOB  Patient reported non-focal, non-positional, non-radiating diffuse chest pressure, associated with SOB. EKG showing normal sinus rhythm, no acute changes. Trop neg.  -Continue to monitor for symptoms    Chronic pain  - Continue PTA fentanyl 25mcg/hr q48h   - Continue  "lidocaine patches  - Oycodone 10mg PRN TID       Hx of Celestino-en-Y gastric bypass c/b short gut syndrome requiring TPN  IR placed PICC (not midline b/c vanco) for TPN on 10/2.    - Nutrition consult, appreciate recommendations     Acute Anemia  Thrombocytopenia  Today: Hgb 12.0, platelets 141; both relatively stable since yesterday with slight decline. On admission: Hgb 13.8, platelet 143. No evidence of bleeding, will continue to monitor.   -Continue to trend CBC     ADHD  - Continue Adderall    HTN  One episode of HTN (145/96) on 10/1. All PTA hypertensive medications continued since admission. Will continue to monitor BP.   - Continue Carvedilol 6.25 mg    Hypocalcemia   Ca 8.4. Phos 4.4. Will continue to monitor.     Diet: Regular Diet Adult  NPO for Medical/Clinical Reasons Except for: Meds, Ice Chips  parenteral nutrition - Clinimix E    Fluids: None   DVT Prophylaxis: Pneumatic Compression Devices  Sanchez Catheter: Not present  Code Status: Full Code       Disposition Plan      Expected discharge: 2 - 3 days, recommended to prior living arrangement once bacteremia resolved.  Entered: Astrid Nesbitt MD 09/29/2019, 10:58 PM       The patient's care was discussed with the Attending Physician, Dr. Faulkner.     DO Dez Villaf[place] 1 Service  [facility], [place]  Pager: [phone]  Please see sticky note for cross cover information     ______________________________________________________________________    Interval History   Tmax 98.2. No acute events overnight.     Patient feels okay this morning.     General: Reports some dizziness/lightheadedness when he walks around, requires him to stabilize using his wife. Normally uses a cane at home, which he does not currently have with him. Also reports HA, unchanged from baseline. Also notes that some mornings when he wakes up the \"world is upside down\" and after a few blinks it is reoriented the right-side up. " "    CV/Resp: Reports non-focal, non-positional, non-radiating chest pressure, associated with SOB. Reports this is new for him, started this morning, and has not worsened. Also reports a sharp focal pain on his right sternal border, approx 2 in below Cm site; describes this as a superficial \"muscle pain.\"     GI: Reports continued generalized abdominal pain and nausea, unchanged from baseline.     Skin: reports some bruising on his left forearm, at the location of the vancomycin injection. Reports swelling a few days ago at this site, noting continued improvement this morning.     Pertinent negatives: no fever/chills, emesis, rashes.    Data reviewed today: I reviewed all medications, new labs and imaging results over the last 24 hours.     Physical Exam   Vital Signs: Temp: 96.1  F (35.6  C) Temp src: Oral BP: 125/83   Heart Rate: 81 Resp: 20 SpO2: 95 % O2 Device: None (Room air)    Weight: 193 lbs 1.6 oz   I/O: not fully reported.    General: Middle aged gentleman sitting upright on edge of bed in no acute distress.   HEENT: Normocephalic, atraumatic, adentulous. No oral lesions.   Heart: RRR. no rubs, murmurs, or gallops   Lungs: CTAB no wheezes, rhonchi, no cough.  Abdomen: Lots of loose abdominal skin, midline abdominal scar, Jim port intact not leaking: non-erythematous, and non-tender. Non-distended, bowel sounds heard in all 4 quadrants, pain to palpation in b/l upper quadrants (unchanged from baseline), otherwise non-tender, no rigidity/guarding.  Edema: none  MSK: 5/5 strength in b/l LE.     Data   Recent Labs   Lab 10/02/19  0536 10/01/19  0819 09/30/19  0610 09/29/19  1559   WBC 6.2 6.8 7.9 8.2   HGB 12.0* 12.3* 12.5* 13.8   MCV 94 95 96 95   * 143* 142* 169   INR  --  1.12  --   --     141 144 143   POTASSIUM 3.4 3.6 3.7 3.8   CHLORIDE 108 107 112* 107   CO2 27 26 24 32   BUN 12 10 10 10   CR 0.76 0.66 0.65* 0.78   ANIONGAP 7 8 8 4   SILAS 8.3* 8.5 8.2* 8.5   GLC 88 90 95 89   ALBUMIN "  --  3.4  --  3.6   PROTTOTAL  --  6.7*  --  7.2   BILITOTAL  --  0.4  --  0.4   ALKPHOS  --  87  --  96   ALT  --  18  --  18   AST  --  14  --  8   LIPASE  --   --   --  58*     EKG (10/2):  -Normal sinus rhythm    TTE (9/30/19):   -No vegetations or masses (doesn't exclude endocarditis).    BC 9/27: Granulicatella adiacens, staph epidermidis, strep  BC 9/29-10/2, daily: no growth to date.  Catheter tip: no growth to date.

## 2019-10-02 NOTE — TELEPHONE ENCOUNTER
Called home infusion, it does not appear that this form should have been sent to [facility]. She will locate the form and send it to the correct provider  Astrid Campos CMA

## 2019-10-02 NOTE — PLAN OF CARE
Shift: 0700 - 1930  VS: Temp: 97.5  F (36.4  C) Temp src: Oral BP: 116/75 Pulse: 69 Heart Rate: 82 Resp: 18 SpO2: 97 % O2 Device: None (Room air)    Pain: Reports discomfort to abdomen, pain is manageable this shift, denies intervention.   Neuro: A&Ox4. Pt reported dizziness today, believes he is dehydrated. He was evaluated by PT, orthostatic teaching and ortho BP completed. Cooperative with care. Goes outside to smoke.   Cardiac:   WDL. EKG this shift results SR. Troponin also negative <0.015. Ortho  sitting to 122 standing.   Respiratory: Lung sounds clear on RA. Smoker.   GI/Diet/Appetite: Regular diet, w/poor tolerance for oral intake of food and fluids. Last BM 9/23, patient reports this is his baseline, reports 4-6 weeks between BM. Refused his miralax.   :  Oliguric, not drinking fluids. Started LR this afternoon at 125ml/hr.   LDA's: PICC to RUE inserted this shift, placement verified by xray, infusing. Pt also has a G-tube for drainage purposes, pt manages draining of the GT and cares, order pt management of G-tube.   Skin: Skin intact.   Activity: independent.   Tests/Procedures:   Pertinent Labs/Lab Collection:      Plan: Continue with ABX therapy, EGD 10/3, NPO at midnight.

## 2019-10-02 NOTE — PLAN OF CARE
AVSS on RA, afebrile. Denies n/v, SOB. Oxycodone given x1 for abdominal pain with relief. PICC placement rescheduled for tomorrow as pt needs 72 hrs of negative blood cultures prior to PICC insertion. New fentanyl patch placed on pts L deltoid. Pt intermittently drains Gtube to gravity. Poor appetite, pt continues to refuse PPN until PICC line is placed. Vanco q8hr w/ benadryl as a premed. Ativan given at HS. Continue to monitor.     Per Mobility Level Guideline;  Bed/chair alarm No.  Patient may ambulate independently  Patient requires the following assistive equipment: N/A  PT/OT consult orders in place No

## 2019-10-02 NOTE — PROGRESS NOTES
Care Coordinator - Discharge Planning    Admission Date/Time:  9/29/2019  Attending MD:  Gerardo Faulkner*     Data  Chart reviewed, discussed with interdisciplinary team.   Patient was admitted for:   1. Positive blood culture    2. Fever, unspecified fever cause    3. On total parenteral nutrition         Assessment   Full assessment completed in previous note     Pt status reviewed/discussed during care team rounds.   Anticipate possible discharge today pending PICC placement.  Pt will be discharged on BID IV vancomycin and would resume home TPN.    Reviewed with Tatum WILDHI.  If discharged today pt will need both doses of IV vancomycin prior to discharge.    Will f/u with provider team after a.m. bedside rounds on anticipated plan for discharge.         Coordination of Care and Referrals: Provided patient/family with options for Home Care and Home Infusion.      Plan  Anticipated Discharge Date:  TBD  Anticipated Discharge Plan:  TBD    Genesis Rivas RN BSN, PHN RN Care Coordinator  Internal Medicine   [phone]  Pager: [phone]  Weekend RN Care Coordinator job code * * * [phone]  10/2/2019 11:37 AM

## 2019-10-02 NOTE — PROGRESS NOTES
"Our office was called by wife about EGD while inpatient. Indeed, I do not see a recent EGD for Mr Curtis, and he did have ulcer disease in 2015. He continues to have chronic abdominal pain and bilious vomiting. It is reasonable to perform EGD. However, he continues to smoke; as such, he may be a candidate for stricture dilation but I do not anticipate appreciable healing of ulcers with continued smoking, and this would preclude any revisional procedure. I spoke with medicine team about EGD tomorrow. He has been added on as an \"add on\" without a reserved time as of now.    Clif Mororw MD    "

## 2019-10-02 NOTE — PROGRESS NOTES
10/02/19 1420   Quick Adds   Type of Visit Initial PT Evaluation   Living Environment   Lives With spouse;child(francheska), dependent   Living Arrangements house   Living Environment Comment Wife able to assist most of the time, she is not working many hours currently.    Self-Care   Usual Activity Tolerance moderate   Current Activity Tolerance moderate   Regular Exercise No   Activity/Exercise/Self-Care Comment Pt's daily energy varies significantly based upon stomach pain and ability to tolerate tube feeds/fluids.    Functional Level Prior   Ambulation 0-->independent   Transferring 0-->independent   Toileting 0-->independent   Bathing 0-->independent   Communication 0-->understands/communicates without difficulty   Swallowing 0-->swallows foods/liquids without difficulty   Cognition 1 - attention or memory deficits   Fall history within last six months yes   Number of times patient has fallen within last six months 4   Prior Functional Level Comment Falls are decribed as being due to symptoms of orthostasis.  He also slipped in water once.  Wife reports pt moves fast and doesn't pay attention to his symptoms until it is too late   General Information   Onset of Illness/Injury or Date of Surgery - Date 09/29/19   Referring Physician Astrid Nesbitt MD   Patient/Family Goals Statement be able to control stomach pain better'   Pertinent History of Current Problem (include personal factors and/or comorbidities that impact the POC) 48 year old male with PMH of Celestino-en-Y gastric bypass complicated by marginal ulceration requiring multiple revisions subsequently requring lap corey, gastrojejunostomy, gastrectomy, appendectomy, chronic abdominal pain on opioids, PUD, severe malnutrition, short gut syndrome on chronic TPN with history of recurrent bacteremia. Admitted on 9/29/19 with Streptococcus, Granulicatella adiacens, and Staph Epidermidis bacteremia; treated currently with vancomycin and ceftriaxone.    Precautions/Limitations fall precautions   Heart Disease Risk Factors Medical history;Smoking   General Observations negative for orthostasis during session, however is having regular IV fluids here and conversely pt reports that at home it is difficult to manitain adequate fluids regularly   Cognitive Status Examination   Orientation orientation to person, place and time   Level of Consciousness alert   Follows Commands and Answers Questions 100% of the time   Personal Safety and Judgment intact   Memory intact   Pain Assessment   Patient Currently in Pain Yes, see Vital Sign flowsheet   Integumentary/Edema   Integumentary/Edema Comments pt reports variable LE edema at home   Posture    Posture Forward head position;Protracted shoulders   Range of Motion (ROM)   ROM Quick Adds No deficits were identified   Strength   Strength Comments generalized weakness 2/2 malnutrition, no focal deficits   Bed Mobility   Bed Mobility Comments independent   Transfer Skills   Transfer Comments independent   Gait   Gait Comments mild instability.  pt reports his gait instability worsens on days of poorer intake   Balance   Balance Comments intact static standing   Sensory Examination   Sensory Perception Comments N/T on plantar feet baseline   Coordination   Coordination no deficits were identified   Muscle Tone   Muscle Tone no deficits were identified   General Therapy Interventions   Planned Therapy Interventions risk factor education;home program guidelines;progressive activity/exercise   Clinical Impression   Criteria for Skilled Therapeutic Intervention yes, treatment indicated   PT Diagnosis impaired endurance   Influenced by the following impairments variable BP, N/T, strength   Functional limitations due to impairments community mobility, falls risk   Clinical Presentation Evolving/Changing   Clinical Presentation Rationale has active infection, difficulty maintaining fluid status   Clinical Decision Making (Complexity)  "Moderate complexity   Therapy Frequency 1x/week   Predicted Duration of Therapy Intervention (days/wks) 1 visit   Anticipated Discharge Disposition Home with Assist   Risk & Benefits of therapy have been explained Yes   Patient, Family & other staff in agreement with plan of care Yes   [facility] AM-PAC  \"6 Clicks\" V.2 Basic Mobility Inpatient Short Form   1. Turning from your back to your side while in a flat bed without using bedrails? 4 - None   2. Moving from lying on your back to sitting on the side of a flat bed without using bedrails? 4 - None   3. Moving to and from a bed to a chair (including a wheelchair)? 4 - None   4. Standing up from a chair using your arms (e.g., wheelchair, or bedside chair)? 4 - None   5. To walk in hospital room? 4 - None   6. Climbing 3-5 steps with a railing? 4 - None   Basic Mobility Raw Score (Score out of 24.Lower scores equate to lower levels of function) 24   Total Evaluation Time   Total Evaluation Time (Minutes) 10     "

## 2019-10-02 NOTE — PLAN OF CARE
Physical Therapy Discharge Summary    Reason for therapy discharge:    All goals and outcomes met, no further needs identified.    Progress towards therapy goal(s). See goals on Care Plan in [facility] electronic health record for goal details.  Goals met    Therapy recommendation(s):    No further therapy is recommended.

## 2019-10-02 NOTE — PROVIDER NOTIFICATION
10/02/19 1620   PICC Double Lumen 10/02/19 Right Basilic   Placement Date/Time: 10/02/19 1620   Catheter Brand: [place]  Size (Fr): 5 Fr  Lot #: [id number]  Full barrier precautions done: Yes, hand hygiene, sterile gown, sterile gloves, mask, cap, full body drape, chlorhexidine scrub  Consent Signed: Yes  Time Ou...   Site Assessment WDL   External Cath Length (cm) 2 cm   Extremity Circumference (cm) 27.5 cm   Dressing Intervention Chlorhexidine patch;Transparent;Securing device;New dressing   Dressing Change Due 10/09/19   PICC Comment PICC insertion done   Lumen A - Color GRAY   Lumen A - Status blood return noted;saline locked   Lumen A - Cap Change Due 10/06/19   Lumen B - Color PURPLE   Lumen B - Status blood return noted;saline locked   Lumen B - Cap Change Due 10/06/19   Extravasation? No   Line Necessity Yes, meets criteria

## 2019-10-02 NOTE — PLAN OF CARE
AVSS, afebrile. C/o abdominal pain, oxycodone given x 1. GI cocktail given x 1. Denies nausea. Plan for PICC placement today. Sleeping between cares. Cont POC.    Per Mobility Level Guideline;  Bed/chair alarm No.  Patient may ambulate independently  Patient requires the following assistive equipment: none   PT/OT consult orders in place No

## 2019-10-03 ENCOUNTER — ANESTHESIA (OUTPATIENT)
Dept: SURGERY | Facility: CLINIC | Age: 49
DRG: 315 | End: 2019-10-03
Payer: COMMERCIAL

## 2019-10-03 ENCOUNTER — APPOINTMENT (OUTPATIENT)
Dept: ULTRASOUND IMAGING | Facility: CLINIC | Age: 49
DRG: 315 | End: 2019-10-03
Payer: COMMERCIAL

## 2019-10-03 LAB
ANION GAP SERPL CALCULATED.3IONS-SCNC: 6 MMOL/L (ref 3–14)
BACTERIA SPEC CULT: NO GROWTH
BUN SERPL-MCNC: 16 MG/DL (ref 7–30)
CALCIUM SERPL-MCNC: 8.3 MG/DL (ref 8.5–10.1)
CHLORIDE SERPL-SCNC: 110 MMOL/L (ref 94–109)
CO2 SERPL-SCNC: 26 MMOL/L (ref 20–32)
CREAT SERPL-MCNC: 0.79 MG/DL (ref 0.66–1.25)
ERYTHROCYTE [DISTWIDTH] IN BLOOD BY AUTOMATED COUNT: 13.9 % (ref 10–15)
GFR SERPL CREATININE-BSD FRML MDRD: >90 ML/MIN/{1.73_M2}
GLUCOSE BLDC GLUCOMTR-MCNC: 87 MG/DL (ref 70–99)
GLUCOSE BLDC GLUCOMTR-MCNC: 89 MG/DL (ref 70–99)
GLUCOSE SERPL-MCNC: 101 MG/DL (ref 70–99)
HCT VFR BLD AUTO: 37.6 % (ref 40–53)
HGB BLD-MCNC: 11.7 G/DL (ref 13.3–17.7)
INTERPRETATION ECG - MUSE: NORMAL
MAGNESIUM SERPL-MCNC: 2.2 MG/DL (ref 1.6–2.3)
MCH RBC QN AUTO: 30 PG (ref 26.5–33)
MCHC RBC AUTO-ENTMCNC: 31.1 G/DL (ref 31.5–36.5)
MCV RBC AUTO: 96 FL (ref 78–100)
PHOSPHATE SERPL-MCNC: 4.5 MG/DL (ref 2.5–4.5)
PLATELET # BLD AUTO: 145 10E9/L (ref 150–450)
POTASSIUM SERPL-SCNC: 3.7 MMOL/L (ref 3.4–5.3)
RBC # BLD AUTO: 3.9 10E12/L (ref 4.4–5.9)
SODIUM SERPL-SCNC: 142 MMOL/L (ref 133–144)
SPECIMEN SOURCE: NORMAL
WBC # BLD AUTO: 7.1 10E9/L (ref 4–11)

## 2019-10-03 PROCEDURE — 27210794 ZZH OR GENERAL SUPPLY STERILE: Performed by: SURGERY

## 2019-10-03 PROCEDURE — 37000009 ZZH ANESTHESIA TECHNICAL FEE, EACH ADDTL 15 MIN: Performed by: SURGERY

## 2019-10-03 PROCEDURE — 25000128 H RX IP 250 OP 636: Performed by: NURSE ANESTHETIST, CERTIFIED REGISTERED

## 2019-10-03 PROCEDURE — 25800030 ZZH RX IP 258 OP 636: Performed by: NURSE ANESTHETIST, CERTIFIED REGISTERED

## 2019-10-03 PROCEDURE — 71000012 ZZH RECOVERY PHASE 1 LEVEL 1 FIRST HR: Performed by: SURGERY

## 2019-10-03 PROCEDURE — 00000146 ZZHCL STATISTIC GLUCOSE BY METER IP

## 2019-10-03 PROCEDURE — 25000128 H RX IP 250 OP 636: Performed by: STUDENT IN AN ORGANIZED HEALTH CARE EDUCATION/TRAINING PROGRAM

## 2019-10-03 PROCEDURE — 40000170 ZZH STATISTIC PRE-PROCEDURE ASSESSMENT II: Performed by: SURGERY

## 2019-10-03 PROCEDURE — 25000125 ZZHC RX 250: Performed by: NURSE ANESTHETIST, CERTIFIED REGISTERED

## 2019-10-03 PROCEDURE — 80048 BASIC METABOLIC PNL TOTAL CA: CPT | Performed by: STUDENT IN AN ORGANIZED HEALTH CARE EDUCATION/TRAINING PROGRAM

## 2019-10-03 PROCEDURE — 25000125 ZZHC RX 250: Performed by: STUDENT IN AN ORGANIZED HEALTH CARE EDUCATION/TRAINING PROGRAM

## 2019-10-03 PROCEDURE — 25000131 ZZH RX MED GY IP 250 OP 636 PS 637: Performed by: STUDENT IN AN ORGANIZED HEALTH CARE EDUCATION/TRAINING PROGRAM

## 2019-10-03 PROCEDURE — 99232 SBSQ HOSP IP/OBS MODERATE 35: CPT | Mod: GC | Performed by: INTERNAL MEDICINE

## 2019-10-03 PROCEDURE — 85027 COMPLETE CBC AUTOMATED: CPT | Performed by: STUDENT IN AN ORGANIZED HEALTH CARE EDUCATION/TRAINING PROGRAM

## 2019-10-03 PROCEDURE — 12000001 ZZH R&B MED SURG/OB UMMC

## 2019-10-03 PROCEDURE — 25000132 ZZH RX MED GY IP 250 OP 250 PS 637: Performed by: STUDENT IN AN ORGANIZED HEALTH CARE EDUCATION/TRAINING PROGRAM

## 2019-10-03 PROCEDURE — 25800030 ZZH RX IP 258 OP 636: Performed by: STUDENT IN AN ORGANIZED HEALTH CARE EDUCATION/TRAINING PROGRAM

## 2019-10-03 PROCEDURE — 25000125 ZZHC RX 250: Performed by: INTERNAL MEDICINE

## 2019-10-03 PROCEDURE — 93971 EXTREMITY STUDY: CPT | Mod: RT

## 2019-10-03 PROCEDURE — 25000128 H RX IP 250 OP 636: Performed by: INTERNAL MEDICINE

## 2019-10-03 PROCEDURE — 36000055 ZZH SURGERY LEVEL 2 W FLUORO 1ST 30 MIN - UMMC: Performed by: SURGERY

## 2019-10-03 PROCEDURE — 3E0436Z INTRODUCTION OF NUTRITIONAL SUBSTANCE INTO CENTRAL VEIN, PERCUTANEOUS APPROACH: ICD-10-PCS | Performed by: INTERNAL MEDICINE

## 2019-10-03 PROCEDURE — 36592 COLLECT BLOOD FROM PICC: CPT | Performed by: STUDENT IN AN ORGANIZED HEALTH CARE EDUCATION/TRAINING PROGRAM

## 2019-10-03 PROCEDURE — 0DB58ZX EXCISION OF ESOPHAGUS, VIA NATURAL OR ARTIFICIAL OPENING ENDOSCOPIC, DIAGNOSTIC: ICD-10-PCS | Performed by: SURGERY

## 2019-10-03 PROCEDURE — 84100 ASSAY OF PHOSPHORUS: CPT | Performed by: STUDENT IN AN ORGANIZED HEALTH CARE EDUCATION/TRAINING PROGRAM

## 2019-10-03 PROCEDURE — 25000566 ZZH SEVOFLURANE, EA 15 MIN: Performed by: SURGERY

## 2019-10-03 PROCEDURE — 83735 ASSAY OF MAGNESIUM: CPT | Performed by: STUDENT IN AN ORGANIZED HEALTH CARE EDUCATION/TRAINING PROGRAM

## 2019-10-03 PROCEDURE — 88305 TISSUE EXAM BY PATHOLOGIST: CPT | Performed by: SURGERY

## 2019-10-03 PROCEDURE — 37000008 ZZH ANESTHESIA TECHNICAL FEE, 1ST 30 MIN: Performed by: SURGERY

## 2019-10-03 PROCEDURE — 25800030 ZZH RX IP 258 OP 636: Performed by: INTERNAL MEDICINE

## 2019-10-03 RX ORDER — PROCHLORPERAZINE MALEATE 10 MG
10 TABLET ORAL EVERY 6 HOURS PRN
Status: DISCONTINUED | OUTPATIENT
Start: 2019-10-03 | End: 2019-10-04 | Stop reason: HOSPADM

## 2019-10-03 RX ORDER — NALOXONE HYDROCHLORIDE 0.4 MG/ML
.1-.4 INJECTION, SOLUTION INTRAMUSCULAR; INTRAVENOUS; SUBCUTANEOUS
Status: CANCELLED | OUTPATIENT
Start: 2019-10-03 | End: 2019-10-04

## 2019-10-03 RX ORDER — ONDANSETRON 4 MG/1
4 TABLET, ORALLY DISINTEGRATING ORAL EVERY 30 MIN PRN
Status: CANCELLED | OUTPATIENT
Start: 2019-10-03

## 2019-10-03 RX ORDER — PROPOFOL 10 MG/ML
INJECTION, EMULSION INTRAVENOUS PRN
Status: DISCONTINUED | OUTPATIENT
Start: 2019-10-03 | End: 2019-10-03

## 2019-10-03 RX ORDER — METOPROLOL TARTRATE 1 MG/ML
1-2 INJECTION, SOLUTION INTRAVENOUS EVERY 5 MIN PRN
Status: CANCELLED | OUTPATIENT
Start: 2019-10-03

## 2019-10-03 RX ORDER — FLUMAZENIL 0.1 MG/ML
0.2 INJECTION, SOLUTION INTRAVENOUS
Status: ACTIVE | OUTPATIENT
Start: 2019-10-03 | End: 2019-10-04

## 2019-10-03 RX ORDER — METOCLOPRAMIDE 10 MG/1
10 TABLET ORAL EVERY 6 HOURS PRN
Status: DISCONTINUED | OUTPATIENT
Start: 2019-10-03 | End: 2019-10-04 | Stop reason: HOSPADM

## 2019-10-03 RX ORDER — LIDOCAINE 40 MG/G
CREAM TOPICAL
Status: DISCONTINUED | OUTPATIENT
Start: 2019-10-03 | End: 2019-10-03 | Stop reason: HOSPADM

## 2019-10-03 RX ORDER — ALBUTEROL SULFATE 0.83 MG/ML
2.5 SOLUTION RESPIRATORY (INHALATION) EVERY 4 HOURS PRN
Status: CANCELLED | OUTPATIENT
Start: 2019-10-03

## 2019-10-03 RX ORDER — ONDANSETRON 2 MG/ML
4 INJECTION INTRAMUSCULAR; INTRAVENOUS EVERY 30 MIN PRN
Status: CANCELLED | OUTPATIENT
Start: 2019-10-03

## 2019-10-03 RX ORDER — LIDOCAINE HYDROCHLORIDE 20 MG/ML
INJECTION, SOLUTION INFILTRATION; PERINEURAL PRN
Status: DISCONTINUED | OUTPATIENT
Start: 2019-10-03 | End: 2019-10-03

## 2019-10-03 RX ORDER — SODIUM CHLORIDE, SODIUM LACTATE, POTASSIUM CHLORIDE, CALCIUM CHLORIDE 600; 310; 30; 20 MG/100ML; MG/100ML; MG/100ML; MG/100ML
INJECTION, SOLUTION INTRAVENOUS CONTINUOUS
Status: DISPENSED | OUTPATIENT
Start: 2019-10-03 | End: 2019-10-04

## 2019-10-03 RX ORDER — SODIUM CHLORIDE, SODIUM LACTATE, POTASSIUM CHLORIDE, CALCIUM CHLORIDE 600; 310; 30; 20 MG/100ML; MG/100ML; MG/100ML; MG/100ML
INJECTION, SOLUTION INTRAVENOUS CONTINUOUS
Status: CANCELLED | OUTPATIENT
Start: 2019-10-03

## 2019-10-03 RX ORDER — NALOXONE HYDROCHLORIDE 0.4 MG/ML
.1-.4 INJECTION, SOLUTION INTRAMUSCULAR; INTRAVENOUS; SUBCUTANEOUS
Status: DISCONTINUED | OUTPATIENT
Start: 2019-10-03 | End: 2019-10-03

## 2019-10-03 RX ORDER — ONDANSETRON 2 MG/ML
4 INJECTION INTRAMUSCULAR; INTRAVENOUS EVERY 6 HOURS PRN
Status: DISCONTINUED | OUTPATIENT
Start: 2019-10-03 | End: 2019-10-04 | Stop reason: HOSPADM

## 2019-10-03 RX ORDER — SODIUM CHLORIDE, SODIUM LACTATE, POTASSIUM CHLORIDE, CALCIUM CHLORIDE 600; 310; 30; 20 MG/100ML; MG/100ML; MG/100ML; MG/100ML
INJECTION, SOLUTION INTRAVENOUS CONTINUOUS PRN
Status: DISCONTINUED | OUTPATIENT
Start: 2019-10-03 | End: 2019-10-03

## 2019-10-03 RX ORDER — SODIUM CHLORIDE, SODIUM LACTATE, POTASSIUM CHLORIDE, CALCIUM CHLORIDE 600; 310; 30; 20 MG/100ML; MG/100ML; MG/100ML; MG/100ML
INJECTION, SOLUTION INTRAVENOUS CONTINUOUS
Status: DISCONTINUED | OUTPATIENT
Start: 2019-10-03 | End: 2019-10-03 | Stop reason: HOSPADM

## 2019-10-03 RX ORDER — DIMENHYDRINATE 50 MG/ML
25 INJECTION, SOLUTION INTRAMUSCULAR; INTRAVENOUS
Status: CANCELLED | OUTPATIENT
Start: 2019-10-03

## 2019-10-03 RX ORDER — FENTANYL CITRATE 50 UG/ML
25-50 INJECTION, SOLUTION INTRAMUSCULAR; INTRAVENOUS
Status: CANCELLED | OUTPATIENT
Start: 2019-10-03

## 2019-10-03 RX ORDER — ONDANSETRON 2 MG/ML
INJECTION INTRAMUSCULAR; INTRAVENOUS PRN
Status: DISCONTINUED | OUTPATIENT
Start: 2019-10-03 | End: 2019-10-03

## 2019-10-03 RX ORDER — ONDANSETRON 4 MG/1
4 TABLET, ORALLY DISINTEGRATING ORAL EVERY 6 HOURS PRN
Status: DISCONTINUED | OUTPATIENT
Start: 2019-10-03 | End: 2019-10-03

## 2019-10-03 RX ORDER — MEPERIDINE HYDROCHLORIDE 50 MG/ML
12.5 INJECTION INTRAMUSCULAR; INTRAVENOUS; SUBCUTANEOUS
Status: CANCELLED | OUTPATIENT
Start: 2019-10-03

## 2019-10-03 RX ORDER — ONDANSETRON 2 MG/ML
4 INJECTION INTRAMUSCULAR; INTRAVENOUS EVERY 6 HOURS PRN
Status: DISCONTINUED | OUTPATIENT
Start: 2019-10-03 | End: 2019-10-03

## 2019-10-03 RX ORDER — HYDRALAZINE HYDROCHLORIDE 20 MG/ML
2.5-5 INJECTION INTRAMUSCULAR; INTRAVENOUS EVERY 10 MIN PRN
Status: CANCELLED | OUTPATIENT
Start: 2019-10-03

## 2019-10-03 RX ORDER — HYDROMORPHONE HYDROCHLORIDE 1 MG/ML
.3-.5 INJECTION, SOLUTION INTRAMUSCULAR; INTRAVENOUS; SUBCUTANEOUS EVERY 10 MIN PRN
Status: CANCELLED | OUTPATIENT
Start: 2019-10-03

## 2019-10-03 RX ORDER — METOCLOPRAMIDE HYDROCHLORIDE 5 MG/ML
10 INJECTION INTRAMUSCULAR; INTRAVENOUS EVERY 6 HOURS PRN
Status: DISCONTINUED | OUTPATIENT
Start: 2019-10-03 | End: 2019-10-04 | Stop reason: HOSPADM

## 2019-10-03 RX ORDER — ONDANSETRON 4 MG/1
4 TABLET, ORALLY DISINTEGRATING ORAL EVERY 6 HOURS PRN
Status: DISCONTINUED | OUTPATIENT
Start: 2019-10-03 | End: 2019-10-04 | Stop reason: HOSPADM

## 2019-10-03 RX ADMIN — PROPOFOL 100 MG: 10 INJECTION, EMULSION INTRAVENOUS at 14:26

## 2019-10-03 RX ADMIN — SUCRALFATE 1 G: 1 SUSPENSION ORAL at 22:36

## 2019-10-03 RX ADMIN — PROPOFOL 50 MG: 10 INJECTION, EMULSION INTRAVENOUS at 14:29

## 2019-10-03 RX ADMIN — SUCRALFATE 1 G: 1 SUSPENSION ORAL at 12:32

## 2019-10-03 RX ADMIN — SODIUM CHLORIDE, POTASSIUM CHLORIDE, SODIUM LACTATE AND CALCIUM CHLORIDE: 600; 310; 30; 20 INJECTION, SOLUTION INTRAVENOUS at 16:30

## 2019-10-03 RX ADMIN — DIPHENHYDRAMINE HYDROCHLORIDE 25 MG: 25 SOLUTION ORAL at 03:47

## 2019-10-03 RX ADMIN — LIDOCAINE HYDROCHLORIDE 100 MG: 20 INJECTION, SOLUTION INFILTRATION; PERINEURAL at 14:26

## 2019-10-03 RX ADMIN — VANCOMYCIN HYDROCHLORIDE 1500 MG: 10 INJECTION, POWDER, LYOPHILIZED, FOR SOLUTION INTRAVENOUS at 16:31

## 2019-10-03 RX ADMIN — PROPOFOL 30 MG: 10 INJECTION, EMULSION INTRAVENOUS at 14:34

## 2019-10-03 RX ADMIN — POTASSIUM CHLORIDE: 2 INJECTION, SOLUTION, CONCENTRATE INTRAVENOUS at 22:32

## 2019-10-03 RX ADMIN — FENTANYL 1 PATCH: 25 PATCH, EXTENDED RELEASE TRANSDERMAL at 22:41

## 2019-10-03 RX ADMIN — CARVEDILOL 6.25 MG: 6.25 TABLET, FILM COATED ORAL at 18:40

## 2019-10-03 RX ADMIN — SODIUM CHLORIDE, POTASSIUM CHLORIDE, SODIUM LACTATE AND CALCIUM CHLORIDE: 600; 310; 30; 20 INJECTION, SOLUTION INTRAVENOUS at 00:44

## 2019-10-03 RX ADMIN — SUCRALFATE 1 G: 1 SUSPENSION ORAL at 07:57

## 2019-10-03 RX ADMIN — ACETAMINOPHEN 500 MG: 325 SOLUTION ORAL at 15:14

## 2019-10-03 RX ADMIN — Medication 5 ML: at 05:44

## 2019-10-03 RX ADMIN — I.V. FAT EMULSION 250 ML: 20 EMULSION INTRAVENOUS at 22:33

## 2019-10-03 RX ADMIN — CARVEDILOL 6.25 MG: 6.25 TABLET, FILM COATED ORAL at 07:57

## 2019-10-03 RX ADMIN — LORAZEPAM 1 MG: 1 TABLET ORAL at 22:34

## 2019-10-03 RX ADMIN — ACETAMINOPHEN 500 MG: 325 SOLUTION ORAL at 05:04

## 2019-10-03 RX ADMIN — VANCOMYCIN HYDROCHLORIDE 1500 MG: 10 INJECTION, POWDER, LYOPHILIZED, FOR SOLUTION INTRAVENOUS at 04:02

## 2019-10-03 RX ADMIN — ONDANSETRON 4 MG: 2 INJECTION INTRAMUSCULAR; INTRAVENOUS at 14:32

## 2019-10-03 RX ADMIN — ONDANSETRON 4 MG: 4 TABLET, ORALLY DISINTEGRATING ORAL at 03:47

## 2019-10-03 RX ADMIN — OXYCODONE HYDROCHLORIDE 10 MG: 5 SOLUTION ORAL at 04:02

## 2019-10-03 RX ADMIN — DEXTROAMPHETAMINE SACCHARATE, AMPHETAMINE ASPARTATE, DEXTROAMPHETAMINE SULFATE AND AMPHETAMINE SULFATE 20 MG: 2.5; 2.5; 2.5; 2.5 TABLET ORAL at 07:57

## 2019-10-03 RX ADMIN — MIDAZOLAM 2 MG: 1 INJECTION INTRAMUSCULAR; INTRAVENOUS at 14:23

## 2019-10-03 RX ADMIN — OXYCODONE HYDROCHLORIDE 10 MG: 5 SOLUTION ORAL at 22:33

## 2019-10-03 RX ADMIN — LIDOCAINE HYDROCHLORIDE 30 ML: 20 SOLUTION ORAL; TOPICAL at 04:59

## 2019-10-03 RX ADMIN — SODIUM CHLORIDE, POTASSIUM CHLORIDE, SODIUM LACTATE AND CALCIUM CHLORIDE: 600; 310; 30; 20 INJECTION, SOLUTION INTRAVENOUS at 14:11

## 2019-10-03 RX ADMIN — OXYCODONE HYDROCHLORIDE 10 MG: 5 SOLUTION ORAL at 15:14

## 2019-10-03 RX ADMIN — DIPHENHYDRAMINE HYDROCHLORIDE 25 MG: 25 SOLUTION ORAL at 15:53

## 2019-10-03 RX ADMIN — ONDANSETRON 4 MG: 4 TABLET, ORALLY DISINTEGRATING ORAL at 15:14

## 2019-10-03 ASSESSMENT — ACTIVITIES OF DAILY LIVING (ADL)
ADLS_ACUITY_SCORE: 13

## 2019-10-03 ASSESSMENT — ENCOUNTER SYMPTOMS
DYSRHYTHMIAS: 0
SEIZURES: 0

## 2019-10-03 ASSESSMENT — LIFESTYLE VARIABLES: TOBACCO_USE: 1

## 2019-10-03 ASSESSMENT — PAIN DESCRIPTION - DESCRIPTORS: DESCRIPTORS: SORE

## 2019-10-03 NOTE — PROGRESS NOTES
Resident/Fellow Attestation   I, Inés Henriquez, was present with the medical student who participated in the service and in the documentation of the note.  I have verified the history and personally performed the physical exam and medical decision making.  I agree with the assessment and plan of care as documented in the note.      48 year old male with PMH of Celestino-en-Y gastric bypass complicated by marginal ulceration requiring multiple revisions subsequently requring lap corey, gastrojejunostomy, gastrectomy, appendectomy, chronic abdominal pain on opioids, PUD, severe malnutrition, short gut syndrome on chronic TPN with history of recurrent catheter-associated bacteremia who presents to ED with positive blood cultures with Staphylococcus epidermidis and Granulicatela adiacens. Originally started on Vancomycin and Ceftriaxone. Ceftriaxone discontinued 10/1 per ID. TTE without evidence of vegetation. No need for RONEL due to negative blood cultures and no Staph Aureus. Patient had Cm removed 10/1.s/p PICC line placed in order to be able to receive TPN (10/2) and IV abx.   - TPN restarted today 10/3  - cards believe CP is non cardiac and recommend outpatient cards follow up  - s/p upper endoscopy with biopsies, advance diet as tolerated  - cont IV vanc  - f/u GI recs: PPI, no carafate, clinic follow up    Inés Henriquez MD  PGY1  Date of Service (when I saw the patient): 10/03/19    [facility], [place]    Progress Note - Maroon 1 Service        Date of Admission:  9/29/2019    Assessment & Plan   Parker Acevedo is a 48 year old male with PMH of Celestino-en-Y gastric bypass complicated by marginal ulceration requiring multiple revisions subsequently requring lap corey, gastrojejunostomy, gastrectomy, appendectomy, chronic abdominal pain on opioids, PUD, severe malnutrition, short gut syndrome on chronic TPN with history of recurrent bacteremia. Admitted on 9/29/19 with  Streptococcus, Granulicatella adiacens, and Staph Epidermidis bacteremia; treated with vancomycin and a completed course of ceftriaxone. PICC line placed and EGD completed.     Central line-associated bacteremia with Streptococcus, Granulicatella adiacens, and Staph Epidermidis  Patient with fevers over the few days PTA. Called home health nurse who tata blood cultures on 9/27 which were positive for Streptococcus and Staph Epidermidis, later positive for granulicatella adiacens on day 4 of culture. Daily blood cultures x4days since have all been negative. Started on Vancomycin and Ceftriaxone in ED after discussion with ID. Patient had WBC of 8.2, UA negative, CXR without evidence of infection on admission. Concerned patient's gram positive bacteremia was secondary to Cm, removed by IR 10/1. TTE without evidence of vegetation; no plan to do RONEL given neg BC since, and strep vegetations usually found with TTE.   - ID consult    - Continue Vancomycin ([id number], discontinue 10/10); discontinue Ceftriaxone (completed course x3days)   - Hold off Vancomycin lock therapy for PICC for now, considering per IR recommendation. ID did not find evidence to support this.   - Obtain surveillance culture at 1 wk (10/17) and 2 wk (10/24) at end of antimicrobial therapy   - Monitor vanco trough level for duration of antimicrobial treatment. When he is in the outpatient setting, the results can faxed to the ID clinic () or to the patient's primary physician  - Changed dosing of Vanco to 1500 mg BID  - Benadryl prior to Vancomycin infusion (due to hx of Tom Syndrome)   - Daily blood cultures   - Culture of catheter tip pending.     Exertional chest pain, dyspnea  Patient reported non-focal, non-positional, non-radiating diffuse chest pressure, associated with SOB. EKG showing normal sinus rhythm, no acute changes. Trop neg. Cards consulted.   -Continue to monitor for symptoms  -Outpatient cardiology  referral    #Right Leg Edema  Noted on exam 10/3. Non-tender (negative squeeze), negative Homans. Non-erythematous, non-warm to touch. No dyspnea. Patient reports this is not atypical, and often alternates legs. Patient does not associate this with position or otherwise.   - Right LE US ordered  - Consider LE wraps    #Chronic pain  - Continue PTA fentanyl 25mcg/hr q48h   - Continue lidocaine patches  - Oycodone 10mg PRN TID       #Hx of Celestino-en-Y gastric bypass c/b short gut syndrome requiring TPN  IR placed PICC (not midline b/c vanco) for TPN on 10/2.    - Nutrition consult, appreciate recommendations   - TPN started today    #Acute Anemia  #Thrombocytopenia  Today: Hgb 11.7 platelets 145; both relatively stable since yesterday with slight decline. On admission: Hgb 13.8, platelet 143. No evidence of bleeding, will continue to monitor.   -Continue to trend CBC     #ADHD  - Continue Adderall    #HTN  #Orthostatic Dizziness  One episode of HTN (145/96) on 10/1. All PTA hypertensive medications continued since admission. Will continue to monitor BP. Orthostatic dizziness, PT consulted and educated patient.  - Continue Carvedilol 6.25 mg  - PT consulted, appreciate recs; plan for outpatient f/u    #Hypocalcemia   Ca 8.4. Phos 4.4. Will continue to monitor.      Diet: Regular Diet Adult  NPO for Medical/Clinical Reasons Except for: Meds, Ice Chips  parenteral nutrition - Clinimix E    Fluids: None   DVT Prophylaxis: Pneumatic Compression Devices  Sanchez Catheter: Not present  Code Status: Full Code       Disposition Plan      Expected discharge: 2 - 3 days, recommended to prior living arrangement once bacteremia resolved.  Entered: Astrid Nesbitt MD 09/29/2019, 10:58 PM       The patient's care was discussed with the Attending Physician, Dr. Faulkner.     DO Dez Villafana 1 Service  [facility], [place]  Pager: [phone]  Please see sticky note for cross cover  information     ______________________________________________________________________    Interval History   Tmax 98.2. No acute events overnight. Patient reported slight discomfort at new PICC site, provided PRN oxycodone.     Patient feels okay this morning.     General: Discussed PT recommendations re: orthostatics (dizziness/lightheadedness when he walks around). Also reports HA, unchanged from baseline. No lightheadedness, no confusion.    CV/Resp: Reports no current chest pain or pressure, no SOB.     GI: Reports continued generalized abdominal pain and nausea, unchanged from baseline. Reports feeling mildly dehydrated.     Skin: reports some swelling of his right forearm, proximal to vancomycin injection site and distal to PICC. Reports swelling started a few hours after PICC placed and has continued to improve this morning.     Edema: Noticed patient's unilateral leg swelling; patient reports this regularly happens and alternates legs. Does not notice any positional or other associations.     Pertinent negatives: no fever/chills, emesis, rashes.    Data reviewed today: I reviewed all medications, new labs and imaging results over the last 24 hours.     Physical Exam   Vital Signs: Temp: 98.5  F (36.9  C) Temp src: Oral BP: (!) 128/90 Pulse: 73 Heart Rate: 66 Resp: 20 SpO2: 98 % O2 Device: None (Room air)    Weight: 193 lbs 1.6 oz   I/O:  -550    General: Middle aged gentleman sitting upright on edge of bed in no acute distress.   HEENT: Normocephalic, atraumatic, adentulous. No oral lesions.   Heart: RRR. no rubs, murmurs, or gallops   Lungs: CTAB no wheezes, rhonchi, no cough.  Abdomen: Lots of loose abdominal skin, midline abdominal scar, Jim port intact not leaking: non-erythematous, and non-tender. Non-distended, bowel sounds heard in all 4 quadrants, pain to palpation in b/l upper quadrants (unchanged from baseline), otherwise non-tender, no rigidity/guarding.   Edema: unilateral right leg swelling  (45 cm left, 40 cm right); non-painful (squeeze), negative Homans, non-erythematous, non-warm.   MSK: 5/5 strength in b/l LE.   Skin: Right forearm had 10 cm swelling, located proximal to vancomycin injection site and distal to PICC. No clear definable border, non-erythematous, non-warm. Mildly tender.     Data   Recent Labs   Lab 10/03/19  0550 10/02/19  1907 10/02/19  1128 10/02/19  0536 10/01/19  0819  09/29/19  1559   WBC 7.1  --   --  6.2 6.8   < > 8.2   HGB 11.7*  --   --  12.0* 12.3*   < > 13.8   MCV 96  --   --  94 95   < > 95   *  --   --  141* 143*   < > 169   INR  --   --   --   --  1.12  --   --      --   --  142 141   < > 143   POTASSIUM 3.7  --   --  3.4 3.6   < > 3.8   CHLORIDE 110*  --   --  108 107   < > 107   CO2 26  --   --  27 26   < > 32   BUN 16  --   --  12 10   < > 10   CR 0.79  --   --  0.76 0.66   < > 0.78   ANIONGAP 6  --   --  7 8   < > 4   SILAS 8.3*  --   --  8.3* 8.5   < > 8.5   *  --   --  88 90   < > 89   ALBUMIN  --   --   --   --  3.4  --  3.6   PROTTOTAL  --   --   --   --  6.7*  --  7.2   BILITOTAL  --   --   --   --  0.4  --  0.4   ALKPHOS  --   --   --   --  87  --  96   ALT  --   --   --   --  18  --  18   AST  --   --   --   --  14  --  8   LIPASE  --   --   --   --   --   --  58*   TROPI  --  <0.015 <0.015  --   --   --   --     < > = values in this interval not displayed.     EKG (10/2):  -Normal sinus rhythm    TTE (9/30/19):   -No vegetations or masses (doesn't exclude endocarditis).    BC 9/27: Granulicatella adiacens, staph epidermidis, strep  BC 9/29-10/2, daily: no growth to date.  Catheter tip: no growth to date.

## 2019-10-03 NOTE — OP NOTE
[facility], [place]    Operative Note    Pre-operative diagnosis: Chronic vomiting   Post-operative diagnosis * No post-op diagnosis entered *   Procedure: Procedure(s):  Upper Endoscopy   Surgeon: Surgeon(s) and Role:     * Clif Morrow MD - Primary   Anesthesia: General    Estimated blood loss: Minimal   Drains: None   Specimens: ID Type Source Tests Collected by Time Destination   A : gastroesophageal junction Tissue Other SURGICAL PATHOLOGY EXAM Clif Morrow MD 10/3/2019  2:36 PM       Findings: mild Grade A esophagitis, biopsied. No evidence of stricturing disease. HEalthy ~50cc gastric pouch, normal, mildly tortuous Celestino limb, normal candy cane limb.   Complications: None.   Implants: None.     COMORBIDITIES:   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)      Chronic abdominal pain      Complication of anesthesia      Difficulty swallowing      Gastric ulcer, unspecified as acute or chronic, without mention of hemorrhage, perforation, or obstruction      Gastro-oesophageal reflux disease      Generalized weakness 1/30/2018     Head injury      Hiatal hernia      Other bladder disorder      Other chronic pain      PONV (postoperative nausea and vomiting)      Severe malnutrition (H)     TPN     Short gut syndrome      Tobacco abuse        OPERATIVE INDICATIONS: Parker Acevedo is a 48 year old male who has severe chronic malnutrition and underwent prior RYGB surgery with revisions.  He is inpatient for PICC/TPN management, continues to smoke and has a history of ulcer disease on his most recent endoscopy in 2015. We planned repeat endoscopy during this admission to look for ulcer disease or explanation of his chronic symptoms.    Height: 180.3 cm (5'  "11\") Weight: 87.6 kg (193 lb 1.6 oz) Body mass index is 26.93 kg/m ..  After understanding the risks and benefits of proceeding with EGD, he agreed to the procedure.    OPERATIVE PROCEDURE:    A timeout procedure was performed.  The patient was positioned in left lateral decubitus position and moderate sedation was administerd.    Oral bite block placed and endoscope (28Fr) introduced orally and advanced through esophagus, stomach, and to the jejunum.   Normal  RYGB was identified.   The esophageal mucosa was inflamed at the GE junction but there were no breaks in the mucosa. This was biopsied x2.  There was no active bleeding in any part of the visualized esophagus, pouch, or GJ anastomosis or in the Celestino limb. The GJ anastomosis was normal in size. There was no evidence of ulcer. The candy cane limb was normal.      Retroflexion was performed. No HH.    Pictures were taken. The esophagus, stomach and jejunum were desufflated.    I was present for all parts of this case.     He should follow up in bariatric clinic in the next few months, and will be contacted if any concerning biopsy findings.    Recs:  -Return to inpatient decker  -Advance diet as tolerated  -Continue PPI chronically; no need for carafate  -Smoking cessation recommended    Clif Morrow MD              "

## 2019-10-03 NOTE — OR NURSING
General Surgery resident and minimally invasive surgery resident paged to enter pre-op orders. Both said that they are not doing the case. I was then referred to Dr. Morrow who would perform the procedure himself.

## 2019-10-03 NOTE — CONSULTS
MIS SURGERY CONSULTATION  10/3/2019       Date of Admission:  9/29/2019  Reason for Consultation: We were asked by Dr. Nesbitt of medicine to evaluate Parker Acevedo for marginal ulcer evaluation. The patient was seen and evaluated.    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 Patient continues to have chronic abdominal pain. He is able to eat but intermittently will have bilious emesis. Denies blood in emesis or stool. He hasn't had a EGD to follow up on his ulcers since 2015. He continues to smoke    ROS:   The Review of Systems is negative other than noted in the HPI    Past Medical History:  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)      Chronic abdominal pain      Complication of anesthesia      Difficulty swallowing      Gastric ulcer, unspecified as acute or chronic, without mention of hemorrhage, perforation, or obstruction      Gastro-oesophageal reflux disease      Generalized weakness 1/30/2018     Head injury      Hiatal hernia      Other bladder disorder      Other chronic pain      PONV (postoperative nausea and vomiting)      Severe malnutrition (H)     TPN     Short gut syndrome      Tobacco abuse        Past Surgical History:   Past Surgical History:   Procedure Laterality Date     AMPUTATION       APPENDECTOMY       BACK SURGERY  11/3/2014    curve in the spine     BIOPSY LYMPH NODE CERVICAL N/A 2/20/2015     Procedure: BIOPSY LYMPH NODE CERVICAL;  Surgeon: Baron Scanlon MD;  Location: [facility]     C GASTRIC BYPASS,OBESE<100CM SHAYLEE-EN-Y  2002    lost 300 pounds     CHOLECYSTECTOMY       DISCECTOMY, FUSION CERVICAL ANTERIOR ONE LEVEL, COMBINED N/A 2/15/2017    Procedure: COMBINED DISCECTOMY, FUSION CERVICAL ANTERIOR ONE LEVEL;  Surgeon: Darren Campos MD;  Location: [facility]     ENDOSCOPIC INSERTION TUBE GASTROSTOMY  9/9/2013    Procedure: ENDOSCOPIC INSERTION TUBE GASTROSTOMY;;  Surgeon: Francis Vyas MD;  [facility]     ENDOSCOPIC ULTRASOUND UPPER GASTROINTESTINAL TRACT (GI)  4/29/2011    Procedure:ENDOSCOPIC ULTRASOUND UPPER GASTROINTESTINAL TRACT (GI); Both Procedures done Conjointly; Surgeon:NEREIDA HOUSER; Location:[facility]     ENDOSCOPIC ULTRASOUND UPPER GASTROINTESTINAL TRACT (GI)  9/9/2013    Procedure: ENDOSCOPIC ULTRASOUND UPPER GASTROINTESTINAL TRACT (GI);  Endoscopic Ultrasound Guide Gastrostomy Tube Placement  C-arm;  Surgeon: Noe Lizarraga MD;  [facility]     ENDOSCOPY  03/25/11    EGD, [facility]     ENDOSCOPY  08/04/09    Upper Endoscopy, [facility]     ENDOSCOPY  01/05/09    Upper Endoscopy, [facility]     ESOPHAGOSCOPY, GASTROSCOPY, DUODENOSCOPY (EGD), COMBINED  4/20/2011    Procedure:COMBINED ESOPHAGOSCOPY, GASTROSCOPY, DUODENOSCOPY (EGD); Surgeon:FRANCIS VYAS; Location: GI     ESOPHAGOSCOPY, GASTROSCOPY, DUODENOSCOPY (EGD), COMBINED  6/15/2011    Procedure:COMBINED ESOPHAGOSCOPY, GASTROSCOPY, DUODENOSCOPY (EGD); Surgeon:FRANCIS VYAS; [facility]     ESOPHAGOSCOPY, GASTROSCOPY, DUODENOSCOPY (EGD), COMBINED  6/12/2013    Procedure: COMBINED ESOPHAGOSCOPY, GASTROSCOPY, DUODENOSCOPY (EGD);;  Surgeon: Francis Vyas MD;  Location:  GI     ESOPHAGOSCOPY, GASTROSCOPY, DUODENOSCOPY (EGD), COMBINED  11/22/2013    Procedure: COMBINED ESOPHAGOSCOPY, GASTROSCOPY, DUODENOSCOPY (EGD);;  Surgeon: Francis Vyas MD;  Location:   OR     ESOPHAGOSCOPY, GASTROSCOPY, DUODENOSCOPY (EGD), COMBINED  4/30/2014    Procedure: COMBINED ESOPHAGOSCOPY, GASTROSCOPY, DUODENOSCOPY (EGD);  Surgeon: Francis Vyas MD;  Location:  GI     ESOPHAGOSCOPY, GASTROSCOPY, DUODENOSCOPY (EGD), COMBINED N/A 2/20/2015    Procedure: COMBINED ESOPHAGOSCOPY, GASTROSCOPY, DUODENOSCOPY (EGD), BIOPSY SINGLE OR MULTIPLE;  Surgeon: Baron Scanlon MD;  Location:  OR     ESOPHAGOSCOPY, GASTROSCOPY, DUODENOSCOPY (EGD), COMBINED N/A 9/30/2015    Procedure: COMBINED ESOPHAGOSCOPY, GASTROSCOPY, DUODENOSCOPY (EGD);  Surgeon: Francis Vyas MD;  Location:  GI     GASTRECTOMY  6/22/2012    Procedure: GASTRECTOMY;  Open Approach, Excise Ulcers,Partial Gastrectomy, Esophagojejunostomy, Hiatal Hernia Repair, Extensive Lysis of Adhesions and Esaphagogastrodudenoscopy.;  Surgeon: Francis Vyas MD;  Location:  OR     GASTROJEJUNOSTOMY  08/26/09    Extensice enterolysis, partial resect. jejunum, part. resect gastric pouch, gastrojejunostomy anastomosis     HC ESOPH/GAS REFLUX TEST W NASAL IMPED ELECTRODE  8/5/2013    Procedure: ESOPHAGEAL IMPEDENCE FUNCTION TEST 1 HOUR OR LESS;  Surgeon: Halie Lang MD;  Location:  GI     HEAD & NECK SURGERY  2/15/2017    C5-C6     HERNIA REPAIR  2006    Umbilical hernia     HERNIORRHAPHY HIATAL  6/22/2012    Procedure: HERNIORRHAPHY HIATAL;;  Surgeon: Francis Vyas MD;  Location:  OR     HERNIORRHAPHY INGUINAL  11/22/2013    Procedure: HERNIORRHAPHY INGUINAL;;  Surgeon: Francis Vyas MD;  [facility]     INSERT PORT VASCULAR ACCESS Right 12/19/2017    Procedure: INSERT PORT VASCULAR ACCESS;  Right Chest Port Placement ;  Surgeon: Lisandro Alejandro PA-C;  Location:  OR     INSERT PORT VASCULAR ACCESS Right 8/2/2018    Procedure: INSERT PORT VASCULAR ACCESS;  Place single lumen tunneled central venous access catheter;  Surgeon: Guy Jamil PA-C;  [facility]     IR CVC  TUNNEL PLACEMENT > 5 YRS OF AGE  8/7/2019     IR CVC TUNNEL REMOVAL RIGHT  10/1/2019     IR FOLLOW UP VISIT OUTPATIENT  8/7/2019     IR PICC PLACEMENT > 5 YRS OF AGE  3/7/2019     LAPAROTOMY EXPLORATORY  11/22/2013    Procedure: LAPAROTOMY EXPLORATORY;  Exploratory Laparotomy, Upper Endoscopy, Left Inguinal Hernia Repair;  Surgeon: Francis Vyas MD;  [facility]     ORTHOPEDIC SURGERY       PICC INSERTION Right 03/16/2017    5fr DL BioFlo PICC, 42cm (3cm external) in the R medial brachial vein w/ tip in the SVC RA junction.     PICC INSERTION Left 09/23/2017    5fr DL BioFlo PICC, 45cm (1cm external) in the L basilic vein w/ tip in the SVC RA junction.     PICC INSERTION Right 05/16/2019    5Fr - 43cm, Medial brachial vein, low SVC     PICC INSERTION Right 10/02/2019    5Fr - 43cm (2cm external), basilic vein, low SVC     SHAYLEE EN Y BOWEL  2003     SOFT TISSUE SURGERY       SOFT TISSUE SURGERY       THORACIC SURGERY       TONSILLECTOMY       TRANSESOPHAGEAL ECHOCARDIOGRAM INTRAOPERATIVE N/A 1/8/2019    Procedure: TRANSESOPHAGEAL ECHOCARDIOGRAM INTRAOPERATIVE;  Surgeon: GENERIC ANESTHESIA PROVIDER;  [facility]       Medications:   No current facility-administered medications on file prior to encounter.   acetaminophen (TYLENOL) 32 mg/mL liquid, Take 500 mg by mouth every 4 hours as needed for fever or mild pain  amphetamine-dextroamphetamine (ADDERALL) 20 MG tablet, Take 1 tablet (20 mg) by mouth daily  blood glucose (NO BRAND SPECIFIED) lancets standard, Use to test blood sugar 1 times daily or as directed.  blood glucose (NO BRAND SPECIFIED) test strip, Use to test blood sugar 1 times daily or as directed.  blood glucose monitoring (NO BRAND SPECIFIED) meter device kit, Use to test blood sugar 1 times daily or as directed.  CARAFATE 1 GM/10ML suspension,   carvedilol (COREG) 6.25 MG tablet, Take 6.25 mg by mouth 2 times daily (with meals)  diphenhydrAMINE (BENADRYL) 12.5 MG/5ML solution, Take 10 mLs  "(25 mg) by mouth 3 times daily as needed for itching (Prior to vanco administration)  fentaNYL (DURAGESIC) 25 mcg/hr 72 hr patch, Place 1 patch onto the skin every 48 hours remove old patch.   May fill 9/3/19 and start 9/5/19. Stop oxycodone  lidocaine (LIDODERM) 5 % Patch, Place 1-2 patches onto the skin every 24 hours Wear for 12 hours, remove for 12 hours.  OK to cut to better fit to size.  LORazepam (ATIVAN) 1 MG tablet, 1-2 mg as needed for anxiety with TPN and medications  Needle, Disp, (BD DISP NEEDLES) 27G X 1/2\" MISC, 1 Device every 30 days Use for cyanocobalamin injection once q 30 days.  ondansetron (ZOFRAN-ODT) 8 MG ODT tab, DISSOLVE ONE TABLET ON TONGUE EVERY 8 HOURS AS NEEDED FOR NAUSEA  oxyCODONE (ROXICODONE) 5 MG/5ML solution, Take 10 mLs (10 mg) by mouth daily as needed for pain . 30 day supply  sucralfate (CARAFATE) 1 GM tablet, Take 1 tablet (1 g) by mouth 4 times daily  vitamin D2 (ERGOCALCIFEROL) 33457 units (1250 mcg) capsule, Take 1 capsule (50,000 Units) by mouth once a week  albuterol (PROAIR HFA/PROVENTIL HFA/VENTOLIN HFA) 108 (90 Base) MCG/ACT inhaler, Inhale 2 puffs into the lungs every 4 hours as needed for shortness of breath / dyspnea or wheezing  cyanocobalamin (CYANOCOBALAMIN) 1000 MCG/ML injection, Inject 1 mL (1,000 mcg) into the muscle every 30 days  [place] [facility] INFUSION MANAGED PATIENT, Contact [facility] for patient specific medication information at [phone] on admission and discharge from the hospital.  Phones are answered 24 hours a day 7 days a week 365 days a year.    Providers - Choose \"CONTINUE HOME MED (no script)\" at discharge if patient treatment with home infusion will continue.  naloxone (NARCAN) 4 MG/0.1ML nasal spray, Spray 4 mg into one nostril alternating nostrils once as needed every 2-3 minutes until assistance arrives  order for DME, Equipment being ordered: Urine Drainage bag, leg.  order for DME, Equipment being ordered: Bilateral knee " high chronic venous insufficiency stockings--  mild-moderate pressures.  polyethylene glycol (MIRALAX/GLYCOLAX) powder, Take 17 g (1 capful) by mouth daily  sodium chloride 0.9% infusion, Inject 1,000-2,000 mLs into the vein as needed   UNABLE TO FIND, States takes TPN 2050 ml  Every 14 hours through PICC  [DISCONTINUED] NO ACTIVE MEDICATIONS, .        Allergies:   Allergies   Allergen Reactions     Bactrim [Sulfamethoxazole W/Trimethoprim] Rash     Penicillins Anaphylaxis     Please see Antimicrobial Management Team allergy assessment note 10/10/2018. Patient reported tolerating amoxicillin.     Doxycycline Rash     Vancomycin Rash     Rash after receiving vancomycin 3/28/16 (red man's?). Tolerated with slower infusion and diphenhydramine premed.       Social History:   Social History     Socioeconomic History     Marital status:      Spouse name: Rose     Number of children: 1     Years of education: Not on file     Highest education level: Not on file   Occupational History     Not on file   Social Needs     Financial resource strain: Not on file     Food insecurity:     Worry: Not on file     Inability: Not on file     Transportation needs:     Medical: Not on file     Non-medical: Not on file   Tobacco Use     Smoking status: Current Some Day Smoker     Packs/day: 0.25     Years: 3.00     Pack years: 0.75     Types: Cigarettes     Last attempt to quit: 2018     Years since quittin.1     Smokeless tobacco: Never Used     Tobacco comment: I use an e cig every now and than   Substance and Sexual Activity     Alcohol use: No     Comment: quit      Drug use: No     Sexual activity: Yes     Partners: Female     Birth control/protection: None     Comment: no protection   Lifestyle     Physical activity:     Days per week: Not on file     Minutes per session: Not on file     Stress: Not on file   Relationships     Social connections:     Talks on phone: Not on file     Gets together: Not on file  "    Attends Congregational service: Not on file     Active member of club or organization: Not on file     Attends meetings of clubs or organizations: Not on file     Relationship status: Not on file     Intimate partner violence:     Fear of current or ex partner: Not on file     Emotionally abused: Not on file     Physically abused: Not on file     Forced sexual activity: Not on file   Other Topics Concern     Parent/sibling w/ CABG, MI or angioplasty before 65F 55M? No   Social History Narrative     Not on file       Family History:   Family History   Problem Relation Age of Onset     Gastrointestinal Disease Mother         Crohns disease     Anxiety Disorder Mother      Thyroid Disease Mother         Grave's disease     Cancer Father         ear cancer-skin cancer/melanoma     Breast Cancer Maternal Grandmother      Macular Degeneration Maternal Grandfather      Anxiety Disorder Sister      Diabetes Maternal Uncle      Breast Cancer Other      Hypertension No family hx of      Hyperlipidemia No family hx of      Cerebrovascular Disease No family hx of      Prostate Cancer No family hx of      Depression No family hx of      Anesthesia Reaction No family hx of      Asthma No family hx of      Osteoporosis No family hx of      Genetic Disorder No family hx of      Obesity No family hx of      Mental Illness No family hx of      Substance Abuse No family hx of      Glaucoma No family hx of        Exam:  /79 (BP Location: Left arm)   Pulse 69   Temp 96.5  F (35.8  C) (Oral)   Resp 20   Ht 1.803 m (5' 11\")   Wt 87.6 kg (193 lb 1.6 oz)   SpO2 96%   BMI 26.93 kg/m      General: NAD, following commands  HEENT: pupils equal and reactive  CV: RRR, no added sounds  Pulm: Non labored breathing, CTAB  Abd: soft, non distended, non tender, healed surgical scars. G tube in place  Ext: Non tender, peripheral pulses palpable  Neuro: A&O x3, CN 2-12 intact, motor and sensation intact     Labs: Reviewed in " full.    Imaging: Reviewed.     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 Also with chronic abdominal pain, short gut, malnutrition on chronic TPN, multiple episodes of bacteremia and PICC infections now admitted with Granulicatella and Staph epidermitis bacteremia. Last EGD to evaluate for marginal ulcer in 2015. Patient continues to have UGI symptoms including bilious emesis and chronic pain. Will schedule for EGD to look for ulcers or strictures. Patient is currently smoking which puts his at risk for marginal ulcers and will prevent healing of said ulcers and preclude any revisional surgery.    Recommendations:  - EGD today (add on, no time given yet)  - work on smoking cessation  - continue TPN    The patient was discussed with Dr. Morrow, [facility] surgery staff, who agrees with the plan.    Yifan Sosa MD PGY-5   Surgery Resident  Pg [phone]

## 2019-10-03 NOTE — CONSULTS
Cardiology Inpatient Consultation  October 3, 2019    Reason for Consult:  A cardiology consult was requested by Dr. Astrid Nesbitt from the [facility] service to provide clinical guidance regarding atypical chest pain.    HPI:   Parker Acevedo is a 48 year old male with a medical history significant for Celestino-en-Y gastric bypass complicated by marginal ulceration requiring multiple revisions subsequently requring lap corey, gastrojejunostomy, gastrectomy, appendectomy, chronic abdominal pain on opioids, PUD, severe malnutrition, short gut syndrome on chronic TPN with history of recurrent bacteremia. Additionally, patient has a cardiac history significant for non ischemic cardiomyopathy with EF of 50%.    Pt is currently admitted for Granulicatella adiacens, and Staph Epidermidis bacteremia.  He is currently being treated with vancomycin and a completed a 3 day course of ceftriaxone. During this admission patient has been complaining of intermittent episodes of right sided chest pressure that he reports are associated with some shortness of breath. He reports these episodes of chest pain have actually been ongoing for several weeks now ever since placement of his tunneled line but have been improving in several ways including that they are now occurring less frequently, lasting shorter periods of time, and are less intense in severity. He denies any radiation of this chest pressure into his back, neck, or arms. Reports these episodes are occurring at rest and are often relieved when he is able to get up and walk around. He states the pressure is at the site of his old tunneled line (which has been removed as of yesterday). States the episodes sometimes last hours when he has them but sometimes last just 10 minutes. He has not had recurrence of the pain since the removal of his line. He follows with a cardiologist regularly for cardiomyopathy and reports he has had stress tests in the past which he  "believes have been negative and was told \"my left side doesn't squeeze as well\".  He otherwise currently denies chest pain, dyspnea at rest or with exertion, orthopnea, PND, palpitations, lightheadedness, or syncope.     Review of Systems:    Complete review of systems was performed and negative except per HPI.    PMH:  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)      Chronic abdominal pain      Complication of anesthesia      Difficulty swallowing      Gastric ulcer, unspecified as acute or chronic, without mention of hemorrhage, perforation, or obstruction      Gastro-oesophageal reflux disease      Generalized weakness 1/30/2018     Head injury      Hiatal hernia      Other bladder disorder      Other chronic pain      PONV (postoperative nausea and vomiting)      Severe malnutrition (H)     TPN     Short gut syndrome      Tobacco abuse      Active Problems:  Patient Active Problem List    Diagnosis Date Noted     Gram-positive bacteremia 09/29/2019     Priority: Medium     On total parenteral nutrition 09/29/2019     Priority: Medium     Added automatically from request for surgery [id number]       PICC line infiltration, sequela 07/22/2019     Priority: Medium     Infection by Candida species 01/04/2019     Priority: Medium     Bacteremia 10/10/2018     Priority: Medium     Hyperlipidemia LDL goal <130 08/07/2018     Priority: Medium     S/P bariatric surgery 07/30/2018     Priority: Medium     Generalized weakness 01/30/2018     Priority: Medium     Catheter-related bloodstream infection (CRBSI) 09/23/2017     Priority: Medium     Low serum iron 09/08/2017     Priority: Medium     Anemia, iron deficiency 09/08/2017     Priority: Medium     Abnormal echocardiogram 04/11/2017     Priority: Medium     Port " or reservoir infection, initial encounter 03/16/2017     Priority: Medium     Status post cervical spinal arthrodesis 02/15/2017     Priority: Medium     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)      Priority: Medium     mild EF ~45% on rest 2/13/17, improves with stressing       Short gut syndrome      Priority: Medium     Anxiety      Priority: Medium     Gastrostomy tube in place (H) 08/09/2016     Priority: Medium     Chronic nausea 05/10/2016     Priority: Medium     Fungemia 04/11/2016     Priority: Medium     Positive blood culture 03/29/2016     Priority: Medium     Insomnia 08/13/2015     Priority: Medium     Chronic pain 07/07/2015     Priority: Medium     Patient is followed by Dr. Milligan for ongoing prescription of pain medication.  All refills should be approved by this provider, or covering partner.    Medication(s): Fentanyl 50 mcg patches every three days/ Liquid oxycodone 5 mg/5ml up to 50 mg daily.   Maximum quantity per month: as per Dr. Milligan [facility]  Clinic visit frequency required: Q 3 months at [facility]    Controlled substance agreement on file: Yes       Date(s): Through [facility]    Pain Clinic evaluation in the past: Yes       Date(s):  Ongoing every three months       Location(s):  Per pain management    DIRE Total Score(s):  No flowsheet data found.    Last [facility] website verification:  Through [facility]   [email or web address]    Esteban Daly MD             Health Care Home 02/24/2015     Priority: Medium              Iron deficiency 05/23/2014     Priority: Medium     Vitamin D deficiency 05/22/2014     Priority: Medium     Former smoker 02/24/2014     Priority: Medium     Bile reflux esophagitis 10/16/2013     Priority: Medium     Constipation 10/01/2013     Priority: Medium     Chronic anxiety 09/25/2013     Priority: Medium     Dysphagia 09/17/2013     Priority: Medium     Weight loss, non-intentional 09/17/2013     Priority:  Medium     Malnutrition (H) 2013     Priority: Medium     Dehydration 2013     Priority: Medium     Vitamin B12 deficiency without anemia 2013     Priority: Medium     Diagnosis updated by automated process. Provider to review and confirm.       Thiamine deficiency 2013     Priority: Medium     ADHD (attention deficit hyperactivity disorder), inattentive type 2013     Priority: Medium     Patient is followed by [facility] for ongoing prescription of stimulants.  All refills should be approved by this provider, or covering partner.    Medication(s): Adderall.   Maximum quantity per month: 30  Clinic visit frequency required:      Controlled substance agreement on file: No  Neuropsych evaluation for ADD completed:  No    Last [facility] website verification:  done on 19  [email or web address]         Anemia 2012     Priority: Medium     Vomiting 2012     Priority: Medium     Chronic abdominal pain 2012     Priority: Medium     Patient is followed by ALEXANDRI[facility] WHITLEY for ongoing prescription of narcotic pain medicine.  Med: liquid oxycodone up to 60 mg per day.   Maximum use per month:   Expected duration: ongoing, hope per surgery that will resolve with upcoming surgery  Narcotic agreement on file: YES  Clinic visit recommended: Q 3 months         Peptic ulcer disease 2010     Priority: Medium     Gastric bypass status for obesity 2010     Priority: Medium     Top weight of 492.       Social History:  Social History     Tobacco Use     Smoking status: Current Some Day Smoker     Packs/day: 0.25     Years: 3.00     Pack years: 0.75     Types: Cigarettes     Last attempt to quit: 2018     Years since quittin.1     Smokeless tobacco: Never Used     Tobacco comment: I use an e cig every now and than   Substance Use Topics     Alcohol use: No     Comment: quit      Drug use: No     Family History:  Family History   Problem  Relation Age of Onset     Gastrointestinal Disease Mother         Crohns disease     Anxiety Disorder Mother      Thyroid Disease Mother         Grave's disease     Cancer Father         ear cancer-skin cancer/melanoma     Breast Cancer Maternal Grandmother      Macular Degeneration Maternal Grandfather      Anxiety Disorder Sister      Diabetes Maternal Uncle      Breast Cancer Other      Hypertension No family hx of      Hyperlipidemia No family hx of      Cerebrovascular Disease No family hx of      Prostate Cancer No family hx of      Depression No family hx of      Anesthesia Reaction No family hx of      Asthma No family hx of      Osteoporosis No family hx of      Genetic Disorder No family hx of      Obesity No family hx of      Mental Illness No family hx of      Substance Abuse No family hx of      Glaucoma No family hx of        Medications:    amphetamine-dextroamphetamine  20 mg Oral Daily     carvedilol  6.25 mg Oral BID w/meals     diphenhydrAMINE  25 mg Oral BID     fentaNYL  25 mcg Transdermal Q48H     fentaNYL   Transdermal Q8H     fentaNYL   Transdermal Q72H     influenza vaccine adult (product based on age)  0.5 mL Intramuscular Prior to discharge     lidocaine  1-2 patch Transdermal Q24H     lidocaine   Transdermal Q8H     lidocaine   Transdermal Q24h     polyethylene glycol  17 g Oral Daily     sodium chloride (PF)  10 mL Intracatheter Q7 Days     sodium chloride (PF)  3 mL Intracatheter Q8H     sucralfate  1 g Oral 4x Daily AC & HS     vancomycin (VANCOCIN) IV  1,500 mg Intravenous Q12H     vitamin D2  50,000 Units Oral Weekly         IV fluid REPLACEMENT ONLY       lactated ringers 125 mL/hr at 10/03/19 0044     parenteral nutrition - Clinimix E         Physical Exam:  Temp:  [96.2  F (35.7  C)-97.7  F (36.5  C)] 96.2  F (35.7  C)  Pulse:  [69] 69  Heart Rate:  [] 66  Resp:  [16-20] 18  BP: (111-135)/(68-92) 113/68  SpO2:  [96 %-98 %] 98 %    Intake/Output Summary (Last 24 hours) at  10/3/2019 1116  Last data filed at 10/3/2019 0551  Gross per 24 hour   Intake 2240.42 ml   Output 600 ml   Net 1640.42 ml     GEN: pleasant, no acute distress  HEENT: no icterus  CV: RR, normal s1/s2, no murmurs/rubs/s3/s4, no heave. No JVD.   CHEST: clear to ausculation bilaterally, no rales or wheezing. Mild right sided chest tenderness on palpation  EXTR: bilateral trace lower extremity edema   NEURO: alert oriented, speech fluent/appropriate, motor grossly nonfocal    Diagnostics:  All labs and imaging were reviewed.  Mg 2.2  Phos 4.5  Lab Results   Component Value Date    TROPI <0.015 10/02/2019    TROPI <0.015 10/02/2019    TROPI <0.015 10/03/2018    TROPI <0.015 06/01/2018    TROPI <0.015 04/25/2018       EKG 10/2/19: NSR without signs of ischemia    Transthoracic echocardiogram:   Interpretation Summary  No vegetation or mass identified, however this does not exclude endocarditis.  LV: Global and regional left ventricular function is normal with an EF of 55-60%.  RV: Right ventricular function, chamber size, wall motion, and thickness are normal. TAPSE is 1.6 cm. TV annular systolic velocity is 9 cm/s.  Mitral valve is normal.  Aortic valve is normal in structure and function.  The tricuspid valve is normal.  The pulmonic valve cannot be assessed.  The inferior vena cava is normal.  No pericardial effusion is present.    Assessment and Recommendation:  1. Noncardiac chest pain  Pt reporting intermittent episodes of right sided chest pressure near the site of tunneled line associated with shortness of breath. Ongoing for several weeks with overall improvement trend with episodes occurring further apart for shorter periods of time with less intensity. Associated with some shortness of breath. Chest pain does not radiate anywhere, occurs at rest and improves with getting up and walking around. Not associated with nausea, vomiting, diaphoresis, lightheadedness, syncope, or palpitations. Has had prior episodes  before and believes it could be due to his chronic abdominal pain from his many abdominal surgeries.  Episodes last 10 minutes-hours. EKG with NSR and no signs of ischemia. Troponin negative x3. Chest pain is most likely non cardiac and given it started around the same time his tunnel line was placed and improved after it's removal it is likely secondary to this.  Follows with cardiologist outpatient for non ischemic cardiomyopathy with EF 50%.     -No further cardiac work up or intervention needed  -follow up with patient's primary cardiologist outpatient as needed      I have discussed the above with Dr. Doe Sepulveda.    Thank you for consulting the cardiovascular services at the [facility]. Please do not hesitate to call us with any questions.     Surya Gary, PGY-1   [facility] Cardiology Consult Team  Pager [phone]

## 2019-10-03 NOTE — PLAN OF CARE
[id number]:    VSS. Afebrile. LS clear, on room air. Denies pain, does have PRN oxycodone if needed. PICC infusing  ml/hr, stopped at 1pm because pt went down to procedure. NPO for procedure. Yesterday IV infiltrated with MD Jamila came to see pt. R arm looks better, no redness, but still a little firm. G tube clamped for the majority of the shift, patient independently manages, and will have it open to gravity from time-to-time. BG check at 11 am 87. [employer], wife present, and involved in cares. Pt got back from EGD procedure at 3pm. Passing report on the next nurse. Continue to monitor and follow POC.

## 2019-10-03 NOTE — PLAN OF CARE
[id number]    VSS. Afebrile. LS clear, on room air. Chronic pain in his abdomen, and slight discomfort in his new PICC site, PRN Oxycodone given. PICC infusing LR (once TPN is restarted, LR can be d/c per MAR).  Patient noticed a soft small lump on his right arm (below his PICC line) after taking off coband, did not hurt, and no recent infusions were given per pt, MD aware, heat pack applied. G tube clamped for the majority of the shift, patient independently manages, and will have it open to gravity from time-to-time. 2200 blood glucose obtained per request, 120. Ativan at HS. [employer], wife present, and involved in cares.    [id number]    NPO at midnight for EGD in the AM, patient verbalized understanding. Benadryl provided 30 minutes prior to Vanco infusion for hx of Red Man Syndrome. Abdominal pain tends to increase during the night (per patient), another dose of Oxy provided. Tylenol for his HA. Nausea intermittent, Zofran given, and ineffective, so patient requested Maalox. Awake for most of the night. Will continue with POC.

## 2019-10-03 NOTE — OR NURSING
"Dr Morrow wrote \"upper endoscopy\" on consent. I immediately notified him that the order in the computer is for an \"esophagogastroduodenoscopy\" and that he needs to change it prior to the procedure.  "

## 2019-10-03 NOTE — ANESTHESIA POSTPROCEDURE EVALUATION
Anesthesia POST Procedure Evaluation    Patient: Parker Acevedo   MRN:     [id number] Gender:   male   Age:    48 year old :      1970        Preoperative Diagnosis: * No pre-op diagnosis entered *   Procedure(s):  Upper Endoscopy   Postop Comments: No value filed.       Anesthesia Type:  Not documented  General    Reportable Event: NO     PAIN: Uncomplicated   Sign Out status: Comfortable, Well controlled pain     PONV: No PONV   Sign Out status:  No Nausea or Vomiting     Neuro/Psych: Uneventful perioperative course   Sign Out Status: Preoperative baseline; Age appropriate mentation     Airway/Resp.: Uneventful perioperative course   Sign Out Status: Non labored breathing, age appropriate RR; Resp. Status within EXPECTED Parameters     CV: Uneventful perioperative course   Sign Out status: Appropriate BP and perfusion indices; Appropriate HR/Rhythm     Disposition:   Sign Out in:  PACU  Disposition:  Phase II; Home  Recovery Course: Uneventful  Follow-Up: Not required           Last Anesthesia Record Vitals:  CRNA VITALS  10/3/2019 1408 - 10/3/2019 1450      10/3/2019             Resp Rate (observed):  (!) 3          Last PACU Vitals:  Vitals Value Taken Time   /87 10/3/2019  2:46 PM   Temp     Pulse 73 10/3/2019  2:46 PM   Resp     SpO2 100 % 10/3/2019  2:49 PM   Temp src     NIBP     Pulse     SpO2     Resp     Temp     Ht Rate     Temp 2     Vitals shown include unvalidated device data.      Electronically Signed By: Venita Rausch MD, October 3, 2019, 2:50 PM

## 2019-10-03 NOTE — ANESTHESIA PREPROCEDURE EVALUATION
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)     mild EF ~45% on rest 17, improves with stressing     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)      Chronic abdominal pain      Complication of anesthesia      Difficulty swallowing      Gastric ulcer, unspecified as acute or chronic, without mention of hemorrhage, perforation, or obstruction      Gastro-oesophageal reflux disease      Generalized weakness 2018     Head injury      Hiatal hernia      Other bladder disorder      Other chronic pain      PONV (postoperative nausea and vomiting)      Severe malnutrition (H)     TPN     Short gut syndrome      Tobacco abuse       Past Surgical History:   Procedure Laterality Date     AMPUTATION       APPENDECTOMY       BACK SURGERY  11/3/2014    curve in the spine     BIOPSY LYMPH NODE CERVICAL N/A 2015    Procedure: BIOPSY LYMPH NODE CERVICAL;  Surgeon: Baron Scanlon MD;  Location: [facility]     C GASTRIC BYPASS,OBESE<100CM SHAYLEE-EN-Y  2002    lost 300 pounds     CHOLECYSTECTOMY       DISCECTOMY, FUSION CERVICAL ANTERIOR ONE LEVEL, COMBINED N/A 2/15/2017    Procedure: COMBINED DISCECTOMY, FUSION CERVICAL ANTERIOR ONE LEVEL;  Surgeon: Darren Campos MD;  Location: [facility]     ENDOSCOPIC INSERTION TUBE GASTROSTOMY  2013    Procedure: ENDOSCOPIC INSERTION TUBE GASTROSTOMY;;  Surgeon: Francis Vyas MD;  [facility]     ENDOSCOPIC ULTRASOUND UPPER GASTROINTESTINAL TRACT (GI)  2011    Procedure:ENDOSCOPIC ULTRASOUND UPPER GASTROINTESTINAL TRACT (GI); Both Procedures done Conjointly; Surgeon:NEREIDA HOUSER; [facility]     ENDOSCOPIC ULTRASOUND UPPER GASTROINTESTINAL TRACT (GI)  9/9/2013    Procedure: ENDOSCOPIC ULTRASOUND UPPER GASTROINTESTINAL TRACT (GI);  Endoscopic Ultrasound Guide Gastrostomy Tube Placement  C-arm;  Surgeon: Noe Lizarraga MD;  Location:  OR     ENDOSCOPY  03/25/11    EGD, [facility]     ENDOSCOPY  08/04/09    Upper Endoscopy, [facility]     ENDOSCOPY  01/05/09    Upper Endoscopy, [facility]     ESOPHAGOSCOPY, GASTROSCOPY, DUODENOSCOPY (EGD), COMBINED  4/20/2011    Procedure:COMBINED ESOPHAGOSCOPY, GASTROSCOPY, DUODENOSCOPY (EGD); Surgeon:BLU VEGA; Location: GI     ESOPHAGOSCOPY, GASTROSCOPY, DUODENOSCOPY (EGD), COMBINED  6/15/2011    Procedure:COMBINED ESOPHAGOSCOPY, GASTROSCOPY, DUODENOSCOPY (EGD); Surgeon:BLU VEGA; Location: GI     ESOPHAGOSCOPY, GASTROSCOPY, DUODENOSCOPY (EGD), COMBINED  6/12/2013    Procedure: COMBINED ESOPHAGOSCOPY, GASTROSCOPY, DUODENOSCOPY (EGD);;  Surgeon: Blu Vega MD;  Location:  GI     ESOPHAGOSCOPY, GASTROSCOPY, DUODENOSCOPY (EGD), COMBINED  11/22/2013    Procedure: COMBINED ESOPHAGOSCOPY, GASTROSCOPY, DUODENOSCOPY (EGD);;  Surgeon: Blu Vega MD;  Location:  OR     ESOPHAGOSCOPY, GASTROSCOPY, DUODENOSCOPY (EGD), COMBINED  4/30/2014    Procedure: COMBINED ESOPHAGOSCOPY, GASTROSCOPY, DUODENOSCOPY (EGD);  Surgeon: Blu Vega MD;  Location:  GI     ESOPHAGOSCOPY, GASTROSCOPY, DUODENOSCOPY (EGD), COMBINED N/A 2/20/2015    Procedure: COMBINED ESOPHAGOSCOPY, GASTROSCOPY, DUODENOSCOPY (EGD), BIOPSY SINGLE OR MULTIPLE;  Surgeon: Baron Scanlon MD;  Location:  OR     ESOPHAGOSCOPY, GASTROSCOPY, DUODENOSCOPY (EGD), COMBINED N/A 9/30/2015    Procedure: COMBINED ESOPHAGOSCOPY, GASTROSCOPY, DUODENOSCOPY (EGD);  Surgeon: Blu Vega MD;  [facility]     GASTRECTOMY  6/22/2012    Procedure: GASTRECTOMY;  Open Approach, Excise Ulcers,Partial Gastrectomy, Esophagojejunostomy,  Hiatal Hernia Repair, Extensive Lysis of Adhesions and Esaphagogastrodudenoscopy.;  Surgeon: Francis Vyas MD;  [facility]     GASTROJEJUNOSTOMY  08/26/09    Extensice enterolysis, partial resect. jejunum, part. resect gastric pouch, gastrojejunostomy anastomosis     HC ESOPH/GAS REFLUX TEST W NASAL IMPED ELECTRODE  8/5/2013    Procedure: ESOPHAGEAL IMPEDENCE FUNCTION TEST 1 HOUR OR LESS;  Surgeon: Halie Lang MD;  [facility]     HEAD & NECK SURGERY  2/15/2017    C5-C6     HERNIA REPAIR  2006    Umbilical hernia     HERNIORRHAPHY HIATAL  6/22/2012    Procedure: HERNIORRHAPHY HIATAL;;  Surgeon: Francis Vyas MD;  [facility]     HERNIORRHAPHY INGUINAL  11/22/2013    Procedure: HERNIORRHAPHY INGUINAL;;  Surgeon: Francis Vyas MD;  [facility]     INSERT PORT VASCULAR ACCESS Right 12/19/2017    Procedure: INSERT PORT VASCULAR ACCESS;  Right Chest Port Placement ;  Surgeon: Lisandro Alejandro PA-C;  [facility]     INSERT PORT VASCULAR ACCESS Right 8/2/2018    Procedure: INSERT PORT VASCULAR ACCESS;  Place single lumen tunneled central venous access catheter;  Surgeon: Guy Jamil PA-C;  [facility]     IR CVC TUNNEL PLACEMENT > 5 YRS OF AGE  8/7/2019     IR CVC TUNNEL REMOVAL RIGHT  10/1/2019     IR FOLLOW UP VISIT OUTPATIENT  8/7/2019     IR PICC PLACEMENT > 5 YRS OF AGE  3/7/2019     LAPAROTOMY EXPLORATORY  11/22/2013    Procedure: LAPAROTOMY EXPLORATORY;  Exploratory Laparotomy, Upper Endoscopy, Left Inguinal Hernia Repair;  Surgeon: Francis Vyas MD;  [facility]     ORTHOPEDIC SURGERY       PICC INSERTION Right 03/16/2017    5fr DL BioFlo PICC, 42cm (3cm external) in the R medial brachial vein w/ tip in the SVC RA junction.     PICC INSERTION Left 09/23/2017    5fr DL BioFlo PICC, 45cm (1cm external) in the L basilic vein w/ tip in the SVC RA junction.     PICC INSERTION Right 05/16/2019    5Fr - 43cm, Medial brachial vein,  low SVC     PICC INSERTION Right 10/02/2019    5Fr - 43cm (2cm external), basilic vein, low SVC     SHAYLEE EN Y BOWEL  2003     SOFT TISSUE SURGERY       SOFT TISSUE SURGERY       THORACIC SURGERY       TONSILLECTOMY       TRANSESOPHAGEAL ECHOCARDIOGRAM INTRAOPERATIVE N/A 1/8/2019    Procedure: TRANSESOPHAGEAL ECHOCARDIOGRAM INTRAOPERATIVE;  Surgeon: GENERIC ANESTHESIA PROVIDER;  [facility]          Anesthesia Evaluation     . Pt has had prior anesthetic. Type: General and MAC    No history of anesthetic complications   - PONV        ROS/MED HX    ENT/Pulmonary:     (+)tobacco use, Current use , . .   (-) asthma   Neurologic:  - neg neurologic ROS    (-) seizures, CVA and TIA   Cardiovascular: Comment:   Left ventricular size is normal. Global and regional left ventricular function  is normal with an EF of 60-65%. Left ventricular wall thickness is normal.  Left ventricular diastolic function is normal.         (+) ----. : . . . :. . Previous cardiac testing Echodate:results:date: results: date: results: date: results:         (-) arrhythmias, irregular heartbeat/palpitations and valvular problems/murmurs   METS/Exercise Tolerance:  3 - Able to walk 1-2 blocks without stopping   Hematologic:         Musculoskeletal:         GI/Hepatic: Comment: S/p gastric bypass c/b short gut syndrome on TPN--TPN held 2-3 d because ? Cm infection    (+) GERD      (-) liver disease   Renal/Genitourinary:      (-) renal disease   Endo:         Psychiatric:  - neg psychiatric ROS       Infectious Disease:         Malignancy:         Other:                         PHYSICAL EXAM:   Mental Status/Neuro: A/A/O   Airway: Facies: Feasible  Mallampati: I  Mouth/Opening: Full  TM distance: > 6 cm  Neck ROM: Full   Respiratory: Auscultation: CTAB     Resp. Rate: Normal     Resp. Effort: Normal      CV: Rhythm: Regular  Heart: Normal Sounds  Pulses: Normal   Comments:      Dental: Details; Endentulous                LABS:  CBC:   Lab  "Results   Component Value Date    WBC 7.1 10/03/2019    WBC 6.2 10/02/2019    HGB 11.7 (L) 10/03/2019    HGB 12.0 (L) 10/02/2019    HCT 37.6 (L) 10/03/2019    HCT 37.5 (L) 10/02/2019     (L) 10/03/2019     (L) 10/02/2019     BMP:   Lab Results   Component Value Date     10/03/2019     10/02/2019    POTASSIUM 3.7 10/03/2019    POTASSIUM 3.4 10/02/2019    CHLORIDE 110 (H) 10/03/2019    CHLORIDE 108 10/02/2019    CO2 26 10/03/2019    CO2 27 10/02/2019    BUN 16 10/03/2019    BUN 12 10/02/2019    CR 0.79 10/03/2019    CR 0.76 10/02/2019     (H) 10/03/2019    GLC 88 10/02/2019     COAGS:   Lab Results   Component Value Date    PTT 26 07/22/2019    INR 1.12 10/01/2019    FIBR 324 10/25/2016     POC:   Lab Results   Component Value Date    BGM 87 10/03/2019     OTHER:   Lab Results   Component Value Date    LACT 1.0 09/29/2019    A1C 4.9 07/23/2013    SILAS 8.3 (L) 10/03/2019    PHOS 4.5 10/03/2019    MAG 2.2 10/03/2019    ALBUMIN 3.4 10/01/2019    PROTTOTAL 6.7 (L) 10/01/2019    ALT 18 10/01/2019    AST 14 10/01/2019    ALKPHOS 87 10/01/2019    BILITOTAL 0.4 10/01/2019    LIPASE 58 (L) 09/29/2019    AMYLASE 178 (H) 06/28/2012    TSH 1.16 05/12/2019    CRP <2.9 07/10/2019    SED 13 05/14/2019        Preop Vitals    BP Readings from Last 3 Encounters:   10/03/19 (!) 130/99   09/11/19 (!) 138/93   08/19/19 128/84    Pulse Readings from Last 3 Encounters:   10/02/19 69   09/11/19 81   08/19/19 84      Resp Readings from Last 3 Encounters:   10/03/19 18   08/19/19 16   08/07/19 18    SpO2 Readings from Last 3 Encounters:   10/03/19 99%   08/19/19 99%   08/07/19 98%      Temp Readings from Last 1 Encounters:   10/03/19 36.9  C (98.4  F) (Oral)    Ht Readings from Last 1 Encounters:   09/29/19 1.803 m (5' 11\")      Wt Readings from Last 1 Encounters:   10/02/19 87.6 kg (193 lb 1.6 oz)    Estimated body mass index is 26.93 kg/m  as calculated from the following:    Height as of this encounter: 1.803 " "m (5' 11\").    Weight as of this encounter: 87.6 kg (193 lb 1.6 oz).     LDA:  Peripheral IV 07/23/19 Left;Anterior Upper forearm (Active)   Number of days: 72       PICC Double Lumen 05/16/19 Right Brachial vein medial (Active)   Number of days: 140       PICC Double Lumen 10/02/19 Right Basilic (Active)   Site Assessment WDL 10/3/2019  1:44 PM   External Cath Length (cm) 2 cm 10/2/2019  4:20 PM   Extremity Circumference (cm) 27.5 cm 10/2/2019  4:20 PM   Dressing Intervention Chlorhexidine patch;Transparent 10/2/2019  8:32 PM   Dressing Change Due 10/09/19 10/2/2019  8:32 PM   PICC Comment PICC insertion done 10/2/2019  4:20 PM   Lumen A - Color WHITE 10/3/2019  1:44 PM   Lumen A - Status saline locked 10/3/2019  1:44 PM   Lumen A - Cap Change Due 10/06/19 10/2/2019  8:32 PM   Lumen B - Color PURPLE 10/3/2019  1:44 PM   Lumen B - Status saline locked 10/3/2019  1:44 PM   Lumen B - Cap Change Due 10/06/19 10/2/2019  8:32 PM   Extravasation? No 10/3/2019  8:00 AM   Line Necessity Yes, meets criteria 10/2/2019  8:32 PM   Number of days: 1       Gastrostomy/Enterostomy Gastrostomy LUQ 1 18 fr (Active)   Site Description WDL 10/3/2019  1:44 PM   Drainage Appearance Bile 10/2/2019  8:32 PM   Status - Gastrostomy Clamped 10/3/2019  1:44 PM   Status - Jejunostomy Clamped 10/3/2019  1:44 PM   Dressing Status Normal: Clean, Dry & Intact 10/3/2019  1:44 PM   Dressing Change Due 10/02/19 10/2/2019 12:10 AM   Output (ml) 125 ml 10/2/2019  2:10 PM   Number of days: 2646        Assessment:   ASA SCORE: 3    H&P: History and physical reviewed and following examination; no interval change.   Smoking Status:  Non-Smoker/Unknown   NPO Status: NPO Appropriate     Plan:   Anes. Type:  General   Pre-Medication: None   Induction:  IV (Standard)   Airway: ETT; Oral   Access/Monitoring: PIV   Maintenance: Balanced     Postop Plan:   Postop Pain: Opioids  Postop Sedation/Airway: Not planned     PONV Management:   Adult Risk Factors:, H/o " PONV or Motion Sickness, Non-Smoker, Postop Opioids   Prevention: Ondansetron, Dexamethasone     CONSENT: Direct conversation   Plan and risks discussed with: Patient   Blood Products: Consent Deferred (Minimal Blood Loss)                   Venita Rausch MD

## 2019-10-03 NOTE — PROGRESS NOTES
CLINICAL NUTRITION SERVICES - BRIEF NOTE     Nutrition Prescription      Recommendations already ordered by Registered Dietitian (RD):  - Order entered to resume pt's PN regimen as follows: CPN with goal vol of 1800 ml/day (x  24 hrs since pt has been off PN since 9/29) with 235 g Dex daily (799 kcal), 132 g AA daily (528 kcal), and 250 ml 20% IV lipids 5x/wk. Regimen provides 1684 kcals (20 kcal/kg/day), 1.6 g PRO/kg/day, GIR 1.9  with 21% kcals from Fat. (per dosing = 84 kg)    Future/Additional Recommendations:  Ability to transitioned back to 14 hrs cycle on PN     Provider consult received for Pharmacy/Nutrition to start & manage PN. Per chart review, pt had his PICC placed yesterday. Initially, pt was to start on PPN per consult received on 9/30, but was not initiated d/t pt declining. Hence, pt has had minimal nutritional intakes since admit on 9/29. ( will take po for pleasure, but minimal benefit received secondary to pt venting via G tube)     Labs:  K+, Mg and PO4 WNL    Intervention:  Met with pt and spouse to discuss plan for resuming PN on a 24 hr schedule d/t concern for refeeding and if lytes remain WNL, then transition back to pts home schedule of 14 hrs. Pt and spouse receptive to plan.    Araseli Eden RD,LD  Unit pager [phone]

## 2019-10-04 ENCOUNTER — HOME INFUSION (PRE-WILLOW HOME INFUSION) (OUTPATIENT)
Dept: PHARMACY | Facility: CLINIC | Age: 49
End: 2019-10-04

## 2019-10-04 VITALS
HEIGHT: 71 IN | WEIGHT: 193 LBS | RESPIRATION RATE: 18 BRPM | SYSTOLIC BLOOD PRESSURE: 135 MMHG | BODY MASS INDEX: 27.02 KG/M2 | HEART RATE: 71 BPM | TEMPERATURE: 97.7 F | DIASTOLIC BLOOD PRESSURE: 96 MMHG | OXYGEN SATURATION: 96 %

## 2019-10-04 LAB
ALBUMIN UR-MCNC: NEGATIVE MG/DL
ANION GAP SERPL CALCULATED.3IONS-SCNC: 8 MMOL/L (ref 3–14)
APPEARANCE UR: CLEAR
BILIRUB UR QL STRIP: NEGATIVE
BUN SERPL-MCNC: 13 MG/DL (ref 7–30)
CALCIUM SERPL-MCNC: 8.4 MG/DL (ref 8.5–10.1)
CHLORIDE SERPL-SCNC: 109 MMOL/L (ref 94–109)
CO2 SERPL-SCNC: 25 MMOL/L (ref 20–32)
COLOR UR AUTO: ABNORMAL
CREAT SERPL-MCNC: 0.75 MG/DL (ref 0.66–1.25)
ERYTHROCYTE [DISTWIDTH] IN BLOOD BY AUTOMATED COUNT: 13.7 % (ref 10–15)
GFR SERPL CREATININE-BSD FRML MDRD: >90 ML/MIN/{1.73_M2}
GLUCOSE BLDC GLUCOMTR-MCNC: 108 MG/DL (ref 70–99)
GLUCOSE SERPL-MCNC: 92 MG/DL (ref 70–99)
GLUCOSE UR STRIP-MCNC: NEGATIVE MG/DL
HCT VFR BLD AUTO: 37.6 % (ref 40–53)
HGB BLD-MCNC: 12.1 G/DL (ref 13.3–17.7)
HGB UR QL STRIP: NEGATIVE
KETONES UR STRIP-MCNC: NEGATIVE MG/DL
LEUKOCYTE ESTERASE UR QL STRIP: ABNORMAL
MAGNESIUM SERPL-MCNC: 2.3 MG/DL (ref 1.6–2.3)
MCH RBC QN AUTO: 30.5 PG (ref 26.5–33)
MCHC RBC AUTO-ENTMCNC: 32.2 G/DL (ref 31.5–36.5)
MCV RBC AUTO: 95 FL (ref 78–100)
MUCOUS THREADS #/AREA URNS LPF: PRESENT /LPF
NITRATE UR QL: NEGATIVE
PH UR STRIP: 6.5 PH (ref 5–7)
PHOSPHATE SERPL-MCNC: 3.7 MG/DL (ref 2.5–4.5)
PLATELET # BLD AUTO: 163 10E9/L (ref 150–450)
POTASSIUM SERPL-SCNC: 3.8 MMOL/L (ref 3.4–5.3)
RBC # BLD AUTO: 3.97 10E12/L (ref 4.4–5.9)
RBC #/AREA URNS AUTO: 11 /HPF (ref 0–2)
SODIUM SERPL-SCNC: 142 MMOL/L (ref 133–144)
SOURCE: ABNORMAL
SP GR UR STRIP: 1.03 (ref 1–1.03)
SQUAMOUS #/AREA URNS AUTO: <1 /HPF (ref 0–1)
UROBILINOGEN UR STRIP-MCNC: NORMAL MG/DL (ref 0–2)
VANCOMYCIN SERPL-MCNC: 18.8 MG/L
WBC # BLD AUTO: 7.2 10E9/L (ref 4–11)
WBC #/AREA URNS AUTO: 1 /HPF (ref 0–5)

## 2019-10-04 PROCEDURE — 80202 ASSAY OF VANCOMYCIN: CPT | Performed by: INTERNAL MEDICINE

## 2019-10-04 PROCEDURE — 25000132 ZZH RX MED GY IP 250 OP 250 PS 637: Performed by: STUDENT IN AN ORGANIZED HEALTH CARE EDUCATION/TRAINING PROGRAM

## 2019-10-04 PROCEDURE — 99238 HOSP IP/OBS DSCHRG MGMT 30/<: CPT | Mod: GC | Performed by: INTERNAL MEDICINE

## 2019-10-04 PROCEDURE — 36415 COLL VENOUS BLD VENIPUNCTURE: CPT | Performed by: PEDIATRICS

## 2019-10-04 PROCEDURE — 90686 IIV4 VACC NO PRSV 0.5 ML IM: CPT | Performed by: PEDIATRICS

## 2019-10-04 PROCEDURE — 81001 URINALYSIS AUTO W/SCOPE: CPT | Performed by: STUDENT IN AN ORGANIZED HEALTH CARE EDUCATION/TRAINING PROGRAM

## 2019-10-04 PROCEDURE — 25000128 H RX IP 250 OP 636: Performed by: PEDIATRICS

## 2019-10-04 PROCEDURE — 25000125 ZZHC RX 250: Performed by: STUDENT IN AN ORGANIZED HEALTH CARE EDUCATION/TRAINING PROGRAM

## 2019-10-04 PROCEDURE — 25800030 ZZH RX IP 258 OP 636: Performed by: INTERNAL MEDICINE

## 2019-10-04 PROCEDURE — 36415 COLL VENOUS BLD VENIPUNCTURE: CPT | Performed by: INTERNAL MEDICINE

## 2019-10-04 PROCEDURE — 80048 BASIC METABOLIC PNL TOTAL CA: CPT | Performed by: PEDIATRICS

## 2019-10-04 PROCEDURE — 25000128 H RX IP 250 OP 636: Performed by: INTERNAL MEDICINE

## 2019-10-04 PROCEDURE — 84100 ASSAY OF PHOSPHORUS: CPT | Performed by: PEDIATRICS

## 2019-10-04 PROCEDURE — 25000131 ZZH RX MED GY IP 250 OP 636 PS 637: Performed by: INTERNAL MEDICINE

## 2019-10-04 PROCEDURE — 85027 COMPLETE CBC AUTOMATED: CPT | Performed by: PEDIATRICS

## 2019-10-04 PROCEDURE — 83735 ASSAY OF MAGNESIUM: CPT | Performed by: PEDIATRICS

## 2019-10-04 PROCEDURE — 00000146 ZZHCL STATISTIC GLUCOSE BY METER IP

## 2019-10-04 RX ORDER — DIPHENHYDRAMINE HCL 12.5MG/5ML
25 LIQUID (ML) ORAL 2 TIMES DAILY
Qty: 118 ML | Refills: 0 | Status: ON HOLD | OUTPATIENT
Start: 2019-10-04 | End: 2019-11-04

## 2019-10-04 RX ORDER — FENTANYL 25 UG/1
1 PATCH TRANSDERMAL
Qty: 15 PATCH | Refills: 0 | Status: SHIPPED | OUTPATIENT
Start: 2019-10-04 | End: 2019-10-29

## 2019-10-04 RX ORDER — OXYCODONE HCL 5 MG/5 ML
10 SOLUTION, ORAL ORAL DAILY PRN
Qty: 300 ML | Refills: 0 | Status: SHIPPED | OUTPATIENT
Start: 2019-10-04 | End: 2019-11-13

## 2019-10-04 RX ADMIN — LIDOCAINE HYDROCHLORIDE 30 ML: 20 SOLUTION ORAL; TOPICAL at 02:41

## 2019-10-04 RX ADMIN — VANCOMYCIN HYDROCHLORIDE 1250 MG: 10 INJECTION, POWDER, LYOPHILIZED, FOR SOLUTION INTRAVENOUS at 17:07

## 2019-10-04 RX ADMIN — ONDANSETRON 4 MG: 4 TABLET, ORALLY DISINTEGRATING ORAL at 04:24

## 2019-10-04 RX ADMIN — ACETAMINOPHEN 500 MG: 325 SOLUTION ORAL at 18:09

## 2019-10-04 RX ADMIN — DEXTROAMPHETAMINE SACCHARATE, AMPHETAMINE ASPARTATE, DEXTROAMPHETAMINE SULFATE AND AMPHETAMINE SULFATE 20 MG: 2.5; 2.5; 2.5; 2.5 TABLET ORAL at 08:16

## 2019-10-04 RX ADMIN — INFLUENZA A VIRUS A/BRISBANE/02/2018 IVR-190 (H1N1) ANTIGEN (FORMALDEHYDE INACTIVATED), INFLUENZA A VIRUS A/KANSAS/14/2017 X-327 (H3N2) ANTIGEN (FORMALDEHYDE INACTIVATED), INFLUENZA B VIRUS B/PHUKET/3073/2013 ANTIGEN (FORMALDEHYDE INACTIVATED), AND INFLUENZA B VIRUS B/MARYLAND/15/2016 BX-69A ANTIGEN (FORMALDEHYDE INACTIVATED) 0.5 ML: 15; 15; 15; 15 INJECTION, SUSPENSION INTRAMUSCULAR at 12:16

## 2019-10-04 RX ADMIN — SUCRALFATE 1 G: 1 SUSPENSION ORAL at 12:15

## 2019-10-04 RX ADMIN — LORAZEPAM 1 MG: 1 TABLET ORAL at 18:44

## 2019-10-04 RX ADMIN — CARVEDILOL 6.25 MG: 6.25 TABLET, FILM COATED ORAL at 18:09

## 2019-10-04 RX ADMIN — OXYCODONE HYDROCHLORIDE 10 MG: 5 SOLUTION ORAL at 18:09

## 2019-10-04 RX ADMIN — VANCOMYCIN HYDROCHLORIDE 1500 MG: 10 INJECTION, POWDER, LYOPHILIZED, FOR SOLUTION INTRAVENOUS at 04:15

## 2019-10-04 RX ADMIN — DIPHENHYDRAMINE HYDROCHLORIDE 25 MG: 25 SOLUTION ORAL at 03:45

## 2019-10-04 RX ADMIN — OXYCODONE HYDROCHLORIDE 10 MG: 5 SOLUTION ORAL at 04:15

## 2019-10-04 RX ADMIN — ONDANSETRON 4 MG: 4 TABLET, ORALLY DISINTEGRATING ORAL at 19:30

## 2019-10-04 RX ADMIN — DIPHENHYDRAMINE HYDROCHLORIDE 25 MG: 25 SOLUTION ORAL at 16:31

## 2019-10-04 RX ADMIN — CARVEDILOL 6.25 MG: 6.25 TABLET, FILM COATED ORAL at 08:17

## 2019-10-04 RX ADMIN — OXYCODONE HYDROCHLORIDE 10 MG: 5 SOLUTION ORAL at 12:16

## 2019-10-04 RX ADMIN — SUCRALFATE 1 G: 1 SUSPENSION ORAL at 16:08

## 2019-10-04 ASSESSMENT — ACTIVITIES OF DAILY LIVING (ADL)
ADLS_ACUITY_SCORE: 13

## 2019-10-04 ASSESSMENT — PAIN DESCRIPTION - DESCRIPTORS: DESCRIPTORS: SORE

## 2019-10-04 ASSESSMENT — MIFFLIN-ST. JEOR: SCORE: 1777.57

## 2019-10-04 NOTE — PLAN OF CARE
VSS on room air. Pain controlled with tylenol and oxycodone. On home TPN/lipids. Will be discharging with IV Vanco. GJ tube for decompression. Voiding but not saving. Does not have regular bowel movements. Up independently. Reviewed AVS with patient and patient verbalized understanding. Patient to discharge home after Vanco infusion is complete.

## 2019-10-04 NOTE — PROGRESS NOTES
Care Coordinator - Discharge Planning    Admission Date/Time:  9/29/2019  Attending MD:  Gerardo Faulkner*     Data  Chart reviewed, discussed with interdisciplinary team.   Patient was admitted for:   1. Chronic abdominal pain    2. Positive blood culture    3. Fever, unspecified fever cause    4. On total parenteral nutrition    5. Peptic ulcer disease    6. Gastric bypass status for obesity    7. On total parenteral nutrition         Assessment   Full assessment completed in previous note     Pt status reviewed/discussed during care team rounds.  Patient cleared to discharge home today.   Pt will resume home TPN.  Pt will be discharged on BID IV Vancomycin.       Reviewed with DAVID Winn Liaison who confirmed she will speak with [facility] pharmacist regarding TPN prescription.   Supplies will be shipped to patients home.  Plan for pt to receive both vanco doses prior to discharge Discharge orders reviewed.  Updated Reza HARRISON.      Notified by DAVID Winn that plan is for patient to discharge home this afternoon and home infusion will deliver IV antibiotic and infusion supplies to patients home this evening.  Pt will not need to stay for his 2nd dose of IV vanco.  Updated CHRISTY Cobb.       1350:Notified by Reza that pt requesting to speak with writer regarding discharge plan.  Met with pt and his SO.   Pt confirmed he has a ride at 1700 today but wondered if he could receive his evening dose here before going home as he would be getting home late etc.   Pt also inquiring about lymphedema consult which provider placed today.    Spoke with [facility] pharmacist who confirmed that the patient cannot receive his evening vanco dose any earlier than currently scheduled d/t recent levels.     Spoke with Dr. Nesbitt regarding lymphedema therapy consult.  Provider placed order this morning but updated order to stat.   Spoke with PT Liayamileth Mon regarding therapy order.  D/t order being placed on day of discharge  and request being for lymphedema a therapist would not see the patient inpatient as this therapy requires additional f/u.   Spoke with Dr. Nesbitt who agreed to place outpatient lymphedema order.  Reviewed above information with pt and his SO. Pt would like his vanco dose prior to discharge.  Updated CHRISTY Cobb.      Coordination of Care and Referrals: Provided patient/family with options for Home Infusion.      Plan  Anticipated Discharge Date:  10/4/19  Anticipated Discharge Plan:  Home with home infusion    Genesis Rivas RN BSN, PHN RN Care Coordinator  Internal Medicine   [phone]  Pager: [phone]  [facility] RN Care Coordinator job code * * * [phone]  [facility] [employer]  10/4/2019 10:16 AM

## 2019-10-04 NOTE — PLAN OF CARE
Patient will leave after receives Vancomycin with premedication,see EMAR. Charge updated with the plan for discharge.Has a double lumen PICC, GJ tube for decompressing. Patient is independently with drain care.had flu shot on his left deltoid muscle.  Alert and oriented x 4. Vital signs. On. stable on room air. Ambulates independently. Wife present at the bedside, supportive of patient. Plan:Continue care. Discharge after vanco. Please fax PICC Placement Report, H&P, medications list and order to the home care agency at [phone] RN.

## 2019-10-04 NOTE — PHARMACY-ADMISSION MEDICATION HISTORY
Pharmacy Vancomycin Note  Date of Service 2019  Patient's  1970   48 year old, male    Indication: Bacteremia  Goal Trough Level: 10-15 mg/L  Day of Therapy: 6  Current Vancomycin regimen:  1,500 mg IV q12h    Current estimated CrCl = Estimated Creatinine Clearance: 152.3 mL/min (based on SCr of 0.75 mg/dL).    Creatinine for last 3 days  10/1/2019:  8:19 AM Creatinine 0.66 mg/dL  10/2/2019:  5:36 AM Creatinine 0.76 mg/dL  10/3/2019:  5:50 AM Creatinine 0.79 mg/dL  10/4/2019:  4:55 AM Creatinine 0.75 mg/dL    Recent Vancomycin Levels (past 3 days)  10/1/2019:  8:19 AM Vancomycin Level 16.9 mg/L  10/4/2019:  4:55 AM Vancomycin Level 18.8 mg/L    Vancomycin IV Administrations (past 72 hours)                   vancomycin 1500 mg in 0.9% NaCl 250 ml intermittent infusion 1,500 mg (mg) 1,500 mg Given 10/04/19 0415     1,500 mg Given 10/03/19 1631     1,500 mg Given  0402     1,500 mg Given 10/02/19 1739    vancomycin 1250 mg in 0.9% NaCl 250 mL intermittent infusion 1,250 mg (mg) 1,250 mg Given 10/02/19 0401     1,250 mg Given 10/01/19 2009     1,250 mg Given  1215                Nephrotoxins and other renal medications (From now, onward)    Start     Dose/Rate Route Frequency Ordered Stop    10/02/19 1601  vancomycin 1500 mg in 0.9% NaCl 250 ml intermittent infusion 1,500 mg      1,500 mg  over 90 Minutes Intravenous EVERY 12 HOURS 10/02/19 0909               Contrast Orders - past 72 hours (72h ago, onward)    None          Interpretation of levels and current regimen:  Trough level is  Supratherapeutic    Has serum creatinine changed > 50% in last 72 hours: No    Urine output:  unable to determine    Renal Function: Stable    Plan:  1.  Decrease Dose to 1,250 mg every 12 hours  2.  Pharmacy will check trough levels as appropriate in 1-3 Days.    3. Serum creatinine levels will be ordered daily for the first week of therapy and at least twice weekly for subsequent weeks.      Chrissie Freeman        .

## 2019-10-04 NOTE — PLAN OF CARE
Cared for pt from [id number]. AVSS, DBP 90. C/o throat discomfort after EGD. Denies nausea. G/Jtube clamped, pt vents intermittently. On regular diet. PICC infusing IV abx and then MIVF. Up independently. Spouse at bedside. Pt to start TPN tonight and get US of RLE edema. Continue POC.

## 2019-10-04 NOTE — TELEPHONE ENCOUNTER
Script Eprescribed to pharmacy    Will send this to MA team to notify patient.    Signed Prescriptions:                        Disp   Refills    fentaNYL (DURAGESIC) 25 mcg/hr 72 hr patch 15 pat*0        Sig: Place 1 patch onto the skin every 48 hours remove old           patch.   May fill 10/3/19 and start 10/5/19.  Authorizing Provider: BRANDYN JENKINS    oxyCODONE (ROXICODONE) 5 MG/5ML solution   300 mL 0        Sig: Take 10 mLs (10 mg) by mouth daily as needed for pain           . 30 day supply. May fill on 10/14/19 to start on           10/15/19  Authorizing Provider: BRANDYN JENKINS MD  [facility]

## 2019-10-04 NOTE — DISCHARGE SUMMARY
[facility], [place]  Discharge Summary - Medicine & Pediatrics       Date of Admission:  9/29/2019  Date of Discharge:  10/4/2019  Discharging Provider: Dr. Faulkner   Discharge Service: Dez 1    Discharge Diagnoses   Central line associated bacteremia   Exertional chest pain  Chronic pain  History of Celestino-en-Y gastric bypass complicated by short gut syndrome and malnutrition  Chronic vomiting   Right lower extremity swelling    Follow-ups Needed After Discharge   Follow-up Appointments     Adult [facility] Follow-up and recommended labs and tests      Follow up with primary care provider, Chuy Isaac, within 7 days to   evaluate medication change, to evaluate treatment change and for hospital   follow- up.  The following labs/tests are recommended: CBC, BMP.      Follow-up with Cardiologist, Dr. Vieira in 2-4 weeks.     Follow-up with surgeon, Dr. Morrow in 2-4 weeks.     Appointments on [place] and/or [facility] (with [facility] or [facility]   provider or service). Call [phone] if you haven't heard regarding   these appointments within 7 days of discharge.         Follow Up and recommended labs and tests      - Vancomycin trough level on 10/6  - CBC and BMP in 1 week   - Blood cultures on 10/17 and 10/24 (Fax results to [phone])             Unresulted Labs Ordered in the Past 30 Days of this Admission     Date and Time Order Name Status Description    10/3/2019 1437 Surgical pathology exam In process     10/1/2019 0001 Blood culture Preliminary     9/30/2019 0001 Blood culture Preliminary     9/29/2019 1543 Blood culture Preliminary     9/29/2019 1543 Blood culture Preliminary     9/27/2019 1225 BLOOD CULTURE Preliminary       These results will be followed up by PCP, ID     Discharge Disposition   Discharged to home  Condition at discharge: Stable    Hospital Course   Parker Acevedo was admitted on 9/29/2019 for central line associated bacteremia.  The following  problems were addressed during his hospitalization:    Central line associated bacteremia  The patient was having fevers prior to admission. Associated with Cm use when he would administer his TPN. The patient called his home health nurse who tata blood cultures on 9/27 which were positive for Streptococcus and Staph Epidermidis, later blood cultures were positive for granulicatella adiacens on day 4 of culture. He was originally started on IV Vancomycin and Ceftriaxone, then narrowed to IV Vancomycin. He had a TTE on 9/29 that did not show evidence of endocarditis. His Cm cathter was removed by IR on 10/1. He had daily blood cultures x 4 days, all of which were negative. He had PICC placed on 10/2 in order to restart home TPN. Upon discharge, the patient will continue BID Vancomycin through 10/10. He will have Vancomycin trough drawn on 10/6. The patient will take Benadryl 30min prior to receiving IV Vancomycin due to history of Red Man Syndrome. The patient will have surveillance blood cultures drawn on 10/17 and 10/24, which will be faxed to ID clinic. He will also have weekly CBC and BMP while receiving IV antibiotics.     Exertional chest pain  Patient reported non-focal, non-positional, non-radiating diffuse chest pressure, associated with SOB. EKG showing normal sinus rhythm, no acute changes. Trop neg. Cardiology consulted who did not feel that patient's chest pain was cardiac in nature. They recommended that patient follow-up with outpatient cardiologist.     Chronic pain  Patient's prior to admission Fentanyl dose was continued upon admission, however, his Oxycodone dose was increased from 10mg daily to TID. This dose was reduced to 10mg daily upon discharge. Patient agreeable to this. He will follow-up in pain clinic.     History of Celestino-En-Y gastric bypass complicated by short gut syndrome and malnutrition  Dietician consulted upon patient's admission to the hospital. He was going to start PPN  but patient refused. While not receiving nutrition, he was on maintenance IV fluids. After PICC placement on 10/2, the patient resumed TPN at home regimen. He will need to follow-up with bariatric surgeon upon discharge to discuss long-term solution to receiving TPN due to removal of Cm during admission.     Chronic vomiting  EGD performed by Dr. Morrow as an inpatient so that he did not have to travel back to the [facility] for his outpatient EGD. There was inflamed gastric mucosa on EGD. PPI was continued upon discharge. He should follow-up in clinic as above.     Right lower extremity swelling  Ultrasound negative for DVT. PT/Lymphedema consult placed upon discharge.     Consultations This Hospital Stay   PHARMACY TO DOSE VANCO  NUTRITION SERVICES ADULT IP CONSULT  INFECTIOUS DISEASE GENERAL ADULT IP CONSULT  VASCULAR ACCESS CARE ADULT IP CONSULT  VASCULAR ACCESS CARE ADULT IP CONSULT  VASCULAR ACCESS CARE ADULT IP CONSULT  VASCULAR ACCESS CARE ADULT IP CONSULT  NUTRITION SERVICES ADULT IP CONSULT  PHARMACY/NUTRITION TO START AND MANAGE TPN  INTERVENTIONAL RADIOLOGY ADULT/PEDS IP CONSULT  PHARMACY IP CONSULT  VASCULAR ACCESS CARE ADULT IP CONSULT  INTERVENTIONAL RADIOLOGY ADULT/PEDS IP CONSULT  VASCULAR ACCESS ADULT IP CONSULT  VASCULAR ACCESS ADULT IP CONSULT  PHYSICAL THERAPY ADULT IP CONSULT  CARDIOLOGY GENERAL ADULT IP CONSULT  VASCULAR ACCESS ADULT IP CONSULT  PHARMACY/NUTRITION TO START AND MANAGE TPN  LYMPHEDEMA THERAPY IP CONSULT  LYMPHEDEMA THERAPY IP CONSULT    Code Status   Full Code     The patient was discussed with DO Dez Graham 1 Service  [facility] [place]  Pager: [phone]  ______________________________________________________________________    Physical Exam   Vital Signs: Temp: 99.4  F (37.4  C) Temp src: Oral BP: 133/85 Pulse: 71 Heart Rate: 72 Resp: 20 SpO2: 97 % O2 Device: None (Room air)    Weight: 193 lbs 0 oz      Primary Care  Physician   Chuy Isaac    Discharge Orders      Home infusion referral      Physical Therapy Referral      Reason for your hospital stay    You were hospitalized for bacteria in your blood. You improved with IV antibiotics and removal of your catheter.     Adult [facility] Follow-up and recommended labs and tests    Follow up with primary care provider, Chuy Isaac, within 7 days to evaluate medication change, to evaluate treatment change and for hospital follow- up.  The following labs/tests are recommended: CBC, BMP.      Follow-up with Cardiologist, Dr. Vieira in 2-4 weeks.     Follow-up with surgeon, Dr. Morrow in 2-4 weeks.     Appointments on [place] and/or [facility] (with [facility] or [facility] provider or service). Call [phone] if you haven't heard regarding these appointments within 7 days of discharge.     Follow Up and recommended labs and tests    - Vancomycin trough level on 10/6  - CBC and BMP in 1 week   - Blood cultures on 10/17 and 10/24 (Fax results to [phone])     Activity    Your activity upon discharge: activity as tolerated     IV access    **Ordering Provider MUST call/page Care Coordinator/ to discuss arranging this service**    You are going home with the following vascular access device: PICC.     Tubes and drains    You are going home with the following tubes or drains: [place]-Key     Discharge Instructions    You were hospitalized for an infection in your blood stream. You improved with IV antibiotics and removal of your catheter. Upon discharge you should do the followin) Receive home IV infusion of IV Vancomycin twice a day thru 10/10  2) You will have blood work drawn (Vancomycin trough) on 10/6  3) You should follow-up with your primary care doctor in 1 week and have blood work prior to that appointment.   4) You will have blood cultures drawn on 10/17 and 10/24.   5) You should follow-up with your cardiologist, Dr. Vieira in 2-4 weeks.   6) You should  follow-up with your surgeon, Dr. Morrow in 2-4 weeks.   7) You should try to quit smoking, as it could worsen your abdominal pain.   8) Continue home regimen of TPN and eat regular food as tolerated.     Full Code     Diet    Regular diet as tolerated, continue current TPN regimen       Significant Results and Procedures   Most Recent 3 CBC's:  Recent Labs   Lab Test 10/04/19  0455 10/03/19  0550 10/02/19  0536   WBC 7.2 7.1 6.2   HGB 12.1* 11.7* 12.0*   MCV 95 96 94    145* 141*     Most Recent 3 BMP's:  Recent Labs   Lab Test 10/04/19  0455 10/03/19  0550 10/02/19  0536    142 142   POTASSIUM 3.8 3.7 3.4   CHLORIDE 109 110* 108   CO2 25 26 27   BUN 13 16 12   CR 0.75 0.79 0.76   ANIONGAP 8 6 7   SILAS 8.4* 8.3* 8.3*   GLC 92 101* 88       Discharge Medications   Current Discharge Medication List      CONTINUE these medications which have CHANGED    Details   diphenhydrAMINE (BENADRYL) 12.5 MG/5ML solution Take 10 mLs (25 mg) by mouth 2 times daily for 6 days  Qty: 118 mL, Refills: 0    Associated Diagnoses: Gram-positive bacteremia      fentaNYL (DURAGESIC) 25 mcg/hr 72 hr patch Place 1 patch onto the skin every 48 hours remove old patch.   May fill 10/3/19 and start 10/5/19.  Qty: 15 patch, Refills: 0    Associated Diagnoses: Chronic abdominal pain; Chronic pain syndrome; Severe malnutrition (H)         CONTINUE these medications which have NOT CHANGED    Details   acetaminophen (TYLENOL) 32 mg/mL liquid Take 500 mg by mouth every 4 hours as needed for fever or mild pain      amphetamine-dextroamphetamine (ADDERALL) 20 MG tablet Take 1 tablet (20 mg) by mouth daily  Qty: 30 tablet, Refills: 0    Associated Diagnoses: ADHD (attention deficit hyperactivity disorder), inattentive type      blood glucose (NO BRAND SPECIFIED) lancets standard Use to test blood sugar 1 times daily or as directed.  Qty: 100 each, Refills: 1    Associated Diagnoses: Elevated glucose level      blood glucose (NO BRAND SPECIFIED)  "test strip Use to test blood sugar 1 times daily or as directed.  Qty: 100 strip, Refills: 3    Associated Diagnoses: Elevated glucose level      blood glucose monitoring (NO BRAND SPECIFIED) meter device kit Use to test blood sugar 1 times daily or as directed.  Qty: 1 kit, Refills: 0    Associated Diagnoses: Elevated glucose level      carvedilol (COREG) 6.25 MG tablet Take 6.25 mg by mouth 2 times daily (with meals)      lidocaine (LIDODERM) 5 % Patch Place 1-2 patches onto the skin every 24 hours Wear for 12 hours, remove for 12 hours.  OK to cut to better fit to size.  Qty: 60 patch, Refills: 6    Associated Diagnoses: Chronic bilateral low back pain without sciatica; Chronic abdominal pain; Myofascial pain      LORazepam (ATIVAN) 1 MG tablet 1-2 mg as needed for anxiety with TPN and medications  Qty: 50 tablet, Refills: 0    Associated Diagnoses: Anxiety      Needle, Disp, (BD DISP NEEDLES) 27G X 1/2\" MISC 1 Device every 30 days Use for cyanocobalamin injection once q 30 days.  Qty: 3 each, Refills: 4    Associated Diagnoses: Bariatric surgery status      ondansetron (ZOFRAN-ODT) 8 MG ODT tab DISSOLVE ONE TABLET ON TONGUE EVERY 8 HOURS AS NEEDED FOR NAUSEA  Qty: 90 tablet, Refills: 1    Associated Diagnoses: Nausea      oxyCODONE (ROXICODONE) 5 MG/5ML solution Take 10 mLs (10 mg) by mouth daily as needed for pain . 30 day supply  Qty: 300 mL, Refills: 0    Associated Diagnoses: Encounter for long-term opiate analgesic use; Chronic abdominal pain      sucralfate (CARAFATE) 1 GM tablet Take 1 tablet (1 g) by mouth 4 times daily  Qty: 120 tablet, Refills: 3    Associated Diagnoses: Gastric bypass status for obesity      vitamin D2 (ERGOCALCIFEROL) 91023 units (1250 mcg) capsule Take 1 capsule (50,000 Units) by mouth once a week  Qty: 12 capsule, Refills: 3    Associated Diagnoses: Vitamin D deficiency      albuterol (PROAIR HFA/PROVENTIL HFA/VENTOLIN HFA) 108 (90 Base) MCG/ACT inhaler Inhale 2 puffs into the " "lungs every 4 hours as needed for shortness of breath / dyspnea or wheezing  Qty: 1 Inhaler, Refills: 1    Comments: Pharmacy may dispense brand covered by insurance (Proair, or proventil or ventolin or generic albuterol inhaler)  Associated Diagnoses: Productive cough      cyanocobalamin (CYANOCOBALAMIN) 1000 MCG/ML injection Inject 1 mL (1,000 mcg) into the muscle every 30 days  Qty: 1 mL, Refills: 11    Associated Diagnoses: Vitamin B12 deficiency (non anemic)      [facility] INFUSION MANAGED PATIENT Contact [facility] for patient specific medication information at [phone] on admission and discharge from the hospital.  Phones are answered 24 hours a day 7 days a week 365 days a year.    Providers - Choose \"CONTINUE HOME MED (no script)\" at discharge if patient treatment with home infusion will continue.    Comments: Resume prior to admission TPN/Lipids/saline  Associated Diagnoses: S/P bariatric surgery      naloxone (NARCAN) 4 MG/0.1ML nasal spray Spray 4 mg into one nostril alternating nostrils once as needed every 2-3 minutes until assistance arrives      !! order for DME Equipment being ordered: Urine Drainage bag, leg.  Qty: 15 Units, Refills: 11    Associated Diagnoses: S/P bariatric surgery      !! order for DME Equipment being ordered: Bilateral knee high chronic venous insufficiency stockings--  mild-moderate pressures.  Qty: 4 each, Refills: 5    Associated Diagnoses: Bilateral edema of lower extremity      polyethylene glycol (MIRALAX/GLYCOLAX) powder Take 17 g (1 capful) by mouth daily  Qty: 578 g, Refills: 11    Associated Diagnoses: Drug-induced constipation      sodium chloride 0.9% infusion Inject 1,000-2,000 mLs into the vein as needed       UNABLE TO FIND States takes TPN 2050 ml  Every 14 hours through PICC       !! - Potential duplicate medications found. Please discuss with provider.      STOP taking these medications       CARAFATE 1 GM/10ML suspension Comments: "   Reason for Stopping:             Allergies   Allergies   Allergen Reactions     Bactrim [Sulfamethoxazole W/Trimethoprim] Rash     Penicillins Anaphylaxis     Please see Antimicrobial Management Team allergy assessment note 10/10/2018. Patient reported tolerating amoxicillin.     Doxycycline Rash     Vancomycin Rash     Rash after receiving vancomycin 3/28/16 (red man's?). Tolerated with slower infusion and diphenhydramine premed.

## 2019-10-05 LAB
BACTERIA SPEC CULT: NO GROWTH
Lab: NORMAL
Lab: NORMAL
SPECIMEN SOURCE: NORMAL

## 2019-10-06 LAB
BACTERIA SPEC CULT: NO GROWTH
Lab: NORMAL
SPECIMEN SOURCE: NORMAL

## 2019-10-07 ENCOUNTER — PATIENT OUTREACH (OUTPATIENT)
Dept: CARE COORDINATION | Facility: CLINIC | Age: 49
End: 2019-10-07

## 2019-10-07 ENCOUNTER — TELEPHONE (OUTPATIENT)
Dept: INTERNAL MEDICINE | Facility: CLINIC | Age: 49
End: 2019-10-07

## 2019-10-07 ENCOUNTER — HOSPITAL ENCOUNTER (OUTPATIENT)
Dept: LAB | Facility: CLINIC | Age: 49
Discharge: HOME | End: 2019-10-07
Attending: SURGERY | Admitting: SURGERY
Payer: COMMERCIAL

## 2019-10-07 DIAGNOSIS — F90.0 ADHD (ATTENTION DEFICIT HYPERACTIVITY DISORDER), INATTENTIVE TYPE: ICD-10-CM

## 2019-10-07 DIAGNOSIS — F41.9 ANXIETY: ICD-10-CM

## 2019-10-07 DIAGNOSIS — Z98.84 S/P BARIATRIC SURGERY: Primary | ICD-10-CM

## 2019-10-07 LAB
ANION GAP SERPL CALCULATED.3IONS-SCNC: 7 MMOL/L (ref 3–14)
BACTERIA SPEC CULT: NO GROWTH
BASOPHILS # BLD AUTO: 0.1 10E9/L (ref 0–0.2)
BASOPHILS NFR BLD AUTO: 0.5 %
BILIRUB DIRECT SERPL-MCNC: <0.1 MG/DL (ref 0–0.2)
BILIRUB SERPL-MCNC: 0.2 MG/DL (ref 0.2–1.3)
BUN SERPL-MCNC: 11 MG/DL (ref 7–30)
CALCIUM SERPL-MCNC: 8.3 MG/DL (ref 8.5–10.1)
CHLORIDE SERPL-SCNC: 110 MMOL/L (ref 94–109)
CO2 SERPL-SCNC: 27 MMOL/L (ref 20–32)
CREAT SERPL-MCNC: 0.76 MG/DL (ref 0.66–1.25)
DIFFERENTIAL METHOD BLD: ABNORMAL
EOSINOPHIL NFR BLD AUTO: 3.1 %
ERYTHROCYTE [DISTWIDTH] IN BLOOD BY AUTOMATED COUNT: 13.9 % (ref 10–15)
GFR SERPL CREATININE-BSD FRML MDRD: >90 ML/MIN/{1.73_M2}
GLUCOSE SERPL-MCNC: 98 MG/DL (ref 70–99)
HCT VFR BLD AUTO: 38.4 % (ref 40–53)
HGB BLD-MCNC: 12.9 G/DL (ref 13.3–17.7)
IMM GRANULOCYTES # BLD: 0 10E9/L (ref 0–0.4)
IMM GRANULOCYTES NFR BLD: 0.2 %
LYMPHOCYTES # BLD AUTO: 2.6 10E9/L (ref 0.8–5.3)
LYMPHOCYTES NFR BLD AUTO: 27.6 %
Lab: NORMAL
MAGNESIUM SERPL-MCNC: 1.7 MG/DL (ref 1.6–2.3)
MCH RBC QN AUTO: 30.9 PG (ref 26.5–33)
MCHC RBC AUTO-ENTMCNC: 33.6 G/DL (ref 31.5–36.5)
MCV RBC AUTO: 92 FL (ref 78–100)
MONOCYTES # BLD AUTO: 1.2 10E9/L (ref 0–1.3)
MONOCYTES NFR BLD AUTO: 13.4 %
NEUTROPHILS # BLD AUTO: 5.1 10E9/L (ref 1.6–8.3)
NEUTROPHILS NFR BLD AUTO: 55.2 %
NRBC # BLD AUTO: 0 10*3/UL
NRBC BLD AUTO-RTO: 0 /100
PHOSPHATE SERPL-MCNC: 3.1 MG/DL (ref 2.5–4.5)
PLATELET # BLD AUTO: 179 10E9/L (ref 150–450)
POTASSIUM SERPL-SCNC: 3.6 MMOL/L (ref 3.4–5.3)
RBC # BLD AUTO: 4.17 10E12/L (ref 4.4–5.9)
SODIUM SERPL-SCNC: 144 MMOL/L (ref 133–144)
SPECIMEN SOURCE: NORMAL
TRIGL SERPL-MCNC: 115 MG/DL
VANCOMYCIN SERPL-MCNC: 9.1 MG/L
WBC # BLD AUTO: 9.2 10E9/L (ref 4–11)

## 2019-10-07 PROCEDURE — 82248 BILIRUBIN DIRECT: CPT | Performed by: SURGERY

## 2019-10-07 PROCEDURE — 85025 COMPLETE CBC W/AUTO DIFF WBC: CPT | Performed by: SURGERY

## 2019-10-07 PROCEDURE — 80048 BASIC METABOLIC PNL TOTAL CA: CPT | Performed by: SURGERY

## 2019-10-07 PROCEDURE — 84100 ASSAY OF PHOSPHORUS: CPT | Performed by: SURGERY

## 2019-10-07 PROCEDURE — 83735 ASSAY OF MAGNESIUM: CPT | Performed by: SURGERY

## 2019-10-07 PROCEDURE — 84478 ASSAY OF TRIGLYCERIDES: CPT | Performed by: SURGERY

## 2019-10-07 PROCEDURE — 80202 ASSAY OF VANCOMYCIN: CPT | Performed by: SURGERY

## 2019-10-07 PROCEDURE — 82247 BILIRUBIN TOTAL: CPT | Performed by: SURGERY

## 2019-10-07 RX ORDER — LORAZEPAM 1 MG/1
TABLET ORAL
Qty: 50 TABLET | Refills: 0 | Status: SHIPPED | OUTPATIENT
Start: 2019-10-07 | End: 2019-11-25

## 2019-10-07 RX ORDER — DEXTROAMPHETAMINE SACCHARATE, AMPHETAMINE ASPARTATE, DEXTROAMPHETAMINE SULFATE AND AMPHETAMINE SULFATE 5; 5; 5; 5 MG/1; MG/1; MG/1; MG/1
20 TABLET ORAL DAILY
Qty: 30 TABLET | Refills: 0 | Status: SHIPPED | OUTPATIENT
Start: 2019-10-07 | End: 2019-11-01

## 2019-10-07 ASSESSMENT — ACTIVITIES OF DAILY LIVING (ADL): DEPENDENT_IADLS:: MEDICATION MANAGEMENT;TRANSPORTATION;SHOPPING

## 2019-10-07 NOTE — PROGRESS NOTES
Clinic Care Coordination Contact    Clinic Care Coordination Contact  OUTREACH    Referral Information:  Referral Source: IP Report    Primary Diagnosis: SIRS/Sepsis    Chief Complaint   Patient presents with     Clinic Care Coordination - Post Hospital     RN        Universal Utilization: Patient is followed Infectious Disease, Pain Management, Cardiology and Bariatric Surgery  Clinic Utilization  Difficulty keeping appointments:: No  Compliance Concerns: Yes  No-Show Concerns: No  No PCP office visit in Past Year: No  Utilization    Last refreshed: 10/7/2019 12:45 PM:  Hospital Admissions 5           Last refreshed: 10/7/2019 12:45 PM:  ED Visits 3           Last refreshed: 10/7/2019 12:45 PM:  No Show Count (past year) 13              Current as of: 10/7/2019 12:45 PM              Clinical Concerns:  Current Medical Concerns:  [facility] for chronic abdominal pain, gram positive bacteremia, central line associated bacteremia, exertional chest pain, chronic pain, chronic vomiting and RLE swelling.  RN CC spoke with both patient and spouse Rose (consent to communicate on file).  Patient has been afebrile since discharge.  Patient's Cm was discontinued while inpatient and now has a PICC line.  Rose states patient was advised to wash the tub with bleach prior to showering/bathing to determine if this will decrease the amount of line infections he encounters.  Patient has a follow up appointment with PCP scheduled for 10/25/19 per patient and spouse preference.  Patient had an EKG and troponin due to exertional chest pain which was negative, patient was instructed to f/u with cardiology, which he made a goal to schedule.  Patient underwent an EGD due to chronic vomiting which showed inflamed gastric mucosa.  Patient's Carafate was continued on discharge, however, patient and spouse state he is unable to tolerate the tablet of Carafate and he requires the liquid.  RN CC  spoke with [facility] staff who will check with the pharmacist once back on site about changing the prescription back to liquid.  If a new prescription is needed the pharmacy will contact Dr. Isaac directly.  Upon further investigation of hospital provider notes:  - f/u GI recs: PPI, no carafate, clinic follow up  And 10/4/19 discharge notes:  PPI was continued upon discharge.  However, no PPI noted in discharge medications.  Will send update to PCP to advise.  Patient is to schedule a follow up with surgery 2-4 weeks post-hospital discharge and a goal was made to schedule this appointment.  Patient denies vomiting since home, last dose of Zofran was yesterday.   Patient has not heard from PT to schedule regarding his RLE swelling, he or his spouse will contact writer if they have not heard anything by 10/9/19.  Patient continues to receive home infusion services through [facility] and his infusion is managed by [facility].  RN CC reviewed when next labs are drawn and verified his home infusion company will be able to draw these as ordered.  Patient is now receiving Benadrayl prior to Vanco infusions due to history of Red Man Syndrome.      Current Behavioral Concerns: Patient has diagnoses of ADHD and anxiety managed by medications.      Education Provided to patient: RN CC reviewed hospital discharge instructions and importance of follow up with providers as advised.     Pain  Pain (GOAL):: Yes  Type: Chronic (>3mo)  Location of chronic pain:: chronic abdominal pain and lower back pain  Radiating: No  Progression: Waxing and Waning  Description of pain: Aching  Chronic pain severity:: 5  Limitation of routine activities due to chronic pain:: Yes  Description: Unable to perform most daily activities (chores, hobbies, social activities, driving)  Alleviating Factors: Pain Medication, Rest, Heat  Aggravating Factors: Eating, Positioning, Activity  Health Maintenance Reviewed: Due/Overdue   Health  Maintenance Due   Topic Date Due     MEDICARE ANNUAL WELLNESS VISIT  06/21/2017     TOBACCO CESSATION COUNSELING  02/06/2019     CIERRA ASSESSMENT  07/30/2019     PHQ-9  07/30/2019      Clinical Pathway: None    Medication Management:  Medication reconciliation completed.  Patient's spouse Rose manages patient's medications.     Functional Status:  Dependent ADLs:: Ambulation-cane, Bathing  Dependent IADLs:: Medication Management, Transportation, Shopping  Bed or wheelchair confined:: No  Mobility Status: Independent  Fallen 2 or more times in the past year?: No  Any fall with injury in the past year?: No    Living Situation:  Current living arrangement:: I live in a private home with spouse  Type of residence:: Private home - stairs    Diet/Exercise/Sleep:  Diet:: Regular(But also has TPN)  Inadequate nutrition (GOAL):: No  Food Insecurity: No  Tube Feeding: No  Exercise:: Currently not exercising  Inadequate activity/exercise (GOAL):: No  Significant changes in sleep pattern (GOAL): No    Transportation:  Transportation concerns (GOAL):: No  Transportation means:: Regular car     Psychosocial:  Gnosticist or spiritual beliefs that impact treatment:: No  Mental health DX:: Yes  Mental health DX how managed:: Medication  Mental health management concern (GOAL):: No  Informal Support system:: Family, Spouse     Financial/Insurance:   Financial/Insurance concerns (GOAL):: No       Resources and Interventions:  Current Resources:   List of home care services:: Skilled Nursing, Physicial Therapy;   Community Resources: Home Infusion, Home Care  Supplies used at home:: None  Equipment Currently Used at Home: cane, straight, shower chair    Advance Care Plan/Directive  Advanced Care Plans/Directives on file:: Yes  Type Advanced Care Plans/Directives: Advanced Directive - On File  Advanced Care Plan/Directive Status: Not Applicable    Referrals Placed: None     Goals:   Goals        General    #1 Medical (pt-stated)      Notes - Note edited  10/7/2019  1:12 PM by Behl, Melissa K, RN    Goal Statement: I want to be free of infection.  Measure of Success: Patient will remain free of any infections.  Supportive Steps to Achieve: New PICC line placed, home infusion services, follow up appointments as instructed, washing bathtub/shower with bleach prior to bathing  Barriers: chronic TPN  Strengths: care coordination, home infusion services  Date to Achieve By: 1/1/20  Patient expressed understanding of goal: yes         #2 Medical (pt-stated)     Notes - Note created  10/7/2019 12:42 PM by Behl, Melissa K, RN    Goal Statement: I will see cardiology.  Measure of Success: Appointment will be scheduled and attended.  Supportive Steps to Achieve: Review of hospital discharge instructions.  Barriers: multiple specialists and appointments  Strengths: care coordination, supportive spouse  Date to Achieve By: 12/1/19  Patient expressed understanding of goal: yes         #3 Medical (pt-stated)     Notes - Note created  10/7/2019 12:43 PM by Behl, Melissa K, RN    Goal Statement: I will see surgery as instructed.  Measure of Success: Appointment will be scheduled and attended.  Supportive Steps to Achieve: Review of hospital discharge instructions.  Barriers: multiple specialists and discharge instructions.  Strengths: care coordination, supportive spouse  Date to Achieve By: 12/1/19  Patient expressed understanding of goal: yes         #4 Improve chronic symptoms (pt-stated)     Notes - Note created  10/7/2019 12:44 PM by Behl, Melissa K, RN    Goal Statement: I want the swelling of my right leg to go down.  Measure of Success: Right leg swelling will decrease or resolve.  Supportive Steps to Achieve: Physical therapy/lymphedema therapy, elevation.  Barriers: unknown  Strengths: care coordination, supportive spouse.  Date to Achieve By: 1/1/20  Patient expressed understanding of goal: yes                 Patient/Caregiver understanding:  Patient/spouse Rose verbalized understanding of information and education provided.    Outreach Frequency: monthly  Future Appointments              In 2 weeks Skinny Friend DPM [facility]    In 2 weeks [facility] Up, [facility]    In 2 weeks Chuy Isaac MD [facility]          Plan:   1. Patient will schedule appointments with cardiology, surgery and PT.  2. Patient will see PCP as scheduled 10/25/19.  3. Patient/spouse will clean shower with bleach prior to patient bathing/showering.  4. RN CC sent message/question regarding PPI to PCP, see above.  5. RN CC will follow up with patient/spouse in 3 weeks per spouse request.    Melissa Behl BSN, RN, PHN, CCM  Primary Care Clinical RN Care Coordinator  [facility]   [phone]

## 2019-10-07 NOTE — PROGRESS NOTES
Hospital notes state:  10/3/19 Dr. Henriquez:  - f/u GI recs: PPI, no carafate, clinic follow up    And 10/4/19 discharge notes by Dr. Faulkner:  PPI was continued upon discharge.    However, no PPI noted in discharge medications.      To PCP to advise regarding no PPI prescribed at time of discharge.      Melissa Behl BSN, RN, PHN, CCM  Primary Care Clinical RN Care Coordinator  [facility]   [phone]

## 2019-10-07 NOTE — TELEPHONE ENCOUNTER
Lorazepam 1 MG       Last Written Prescription Date:  8/30/19  Last Fill Quantity: 50,   # refills: 0  Last Office Visit: 8/19/19  Future Office visit:    Next 5 appointments (look out 90 days)    Oct 25, 2019 11:30 AM CDT  Office Visit with Chuy Isaac MD  [address] [address]  [phone]           Routing refill request to provider for review/approval because:  Drug not on the [facility], [facility] or [facility] refill protocol or controlled substance  Adderall       Last Written Prescription Date:  8/30/19  Last Fill Quantity: 30,   # refills: 0  Last Office Visit: 8/19/19  Future Office visit:    Next 5 appointments (look out 90 days)    Oct 25, 2019 11:30 AM CDT  Office Visit with Chuy Isaac MD  [facility] ([address] [address]  [phone]           Routing refill request to provider for review/approval because:  Drug not on the [facility], [facility] or [facility] refill protocol or controlled substance

## 2019-10-07 NOTE — PROGRESS NOTES
This is a recent snapshot of the patient's [facility] medical record.  For current drug dose and complete information and questions, call [phone] or In Basket pool, fv home infusion ([id number])  CSN Number:  [id number]

## 2019-10-07 NOTE — LETTER
[facility]  Complex Care Plan  About Me:    Patient Name:  Parker Acevedo    YOB: 1970  Age:         48 year old   [place] MRN:    [id number] Telephone Information:  Home Phone [phone]   Mobile [phone]       Address:  [address] [place] [address] Email address:  [email or web address]      Emergency Contact(s)    Name Relationship Lgl Grd Work Phone Home Phone Mobile Phone   1. BERTRAND RAMOS* Spouse No  [phone]   2. JEYSON CAIN * Relative No  [phone]   3. [place]* Mother No  [phone]           Primary language:  English     needed? No   [place] [facility]:  [phone] op. 1  Other communication barriers: Glasses, Physical impairment  Preferred Method of Communication:  Chris  Current living arrangement: I live in a private home with spouse  Mobility Status/ Medical Equipment: Independent    Health Maintenance  Health Maintenance Reviewed: Due/Overdue   Health Maintenance Due   Topic Date Due     MEDICARE ANNUAL WELLNESS VISIT  06/21/2017     TOBACCO CESSATION COUNSELING  02/06/2019     CIERRA ASSESSMENT  07/30/2019     PHQ-9  07/30/2019        My Access Plan  Medical Emergency 911   Primary Clinic Line [facility] - [phone]   24 Hour Appointment Line [phone] or  [phone]) (toll-free)   24 Hour Nurse Line [phone] (toll-free)   Preferred Urgent Care Other   Preferred Hospital [facility]  [phone]   Preferred Pharm[place] [place] [facility] [place] [address]     Behavioral Health Crisis Line The National Suicide Prevention Lifeline at 1-736.941.5182 or 911             My Care Team Members  Patient Care Team       Relationship Specialty Notifications Start End    Chuy Isaac MD PCP - General Internal Medicine  10/30/18     Phone: [phone] Fax:  [phone]         [address] [place] [address]    Francis Vyas MD Referring Physician Surgery  4/9/15     GI     Phone: [phone] Fax: [phone]         [address] [facility] [address] [facility] [address]    Bill Brumfield MD MD Gastroenterology  6/2/15     Phone: [phone] Fax: [phone]         [address] [facility] [address] [facility] [address]    Patti Milligan MD MD Pain Clinic  6/2/15     Phone: [phone] Fax: [phone]         [address] [place] [address] [facility] [address]    Behl, Melissa K, RN Lead Care Coordinator Primary Care - CC Admissions 3/28/18     Phone: [phone] Fax: [phone]        Chuy Isaac MD Assigned PCP   11/11/18     Phone: [phone] Fax: [phone]         [address] [place] [address]    Marlyn Jenkins MD MD Ophthalmology  1/29/19     Phone: [phone] Fax: [phone]         [address] [facility] [address] [facility] [address]    Marlyn Jenkins MD MD Ophthalmology  2/11/19     Phone: [phone] Fax: [phone]         [address] [facility] [address] [facility] [address]    Raul Acevedo, [facility] Pharmacist Pharmacist Clinician- Clinical Pharmacy Specialist  7/25/19     Phone: [phone] Fax: [phone] [address] [place] [address] [facility] [address]            My Care Plans  Self Management and Treatment Plan  Goals and (Comments)  Goals        General    #1 Medical (pt-stated)     Notes - Note edited  10/7/2019 12:41 PM by Behl, Melissa K, RN    Goal Statement: I want to be free of infection.  Measure of Success: Patient will remain free of any infections.  Supportive Steps to Achieve: New PICC line placed, home infusion services, follow up appointments as instructed  Barriers: chronic TPN  Strengths: care coordination, home infusion services  Date to Achieve By: 1/1/20  Patient expressed understanding of goal: yes         #2 Medical (pt-stated)     Notes - Note created  10/7/2019 12:42 PM  by Behl, Melissa K, RN    Goal Statement: I will see cardiology.  Measure of Success: Appointment will be scheduled and attended.  Supportive Steps to Achieve: Review of hospital discharge instructions.  Barriers: multiple specialists and appointments  Strengths: care coordination, supportive spouse  Date to Achieve By: 12/1/19  Patient expressed understanding of goal: yes         #3 Medical (pt-stated)     Notes - Note created  10/7/2019 12:43 PM by Behl, Melissa K, RN    Goal Statement: I will see surgery as instructed.  Measure of Success: Appointment will be scheduled and attended.  Supportive Steps to Achieve: Review of hospital discharge instructions.  Barriers: multiple specialists and discharge instructions.  Strengths: care coordination, supportive spouse  Date to Achieve By: 12/1/19  Patient expressed understanding of goal: yes         #4 Improve chronic symptoms (pt-stated)     Notes - Note created  10/7/2019 12:44 PM by Behl, Melissa K, RN    Goal Statement: I want the swelling of my right leg to go down.  Measure of Success: Right leg swelling will decrease or resolve.  Supportive Steps to Achieve: Physical therapy/lymphedema therapy, elevation.  Barriers: unknown  Strengths: care coordination, supportive spouse.  Date to Achieve By: 1/1/20  Patient expressed understanding of goal: yes                Action Plans on File:                       Advance Care Plans/Directives Type:   Type Advanced Care Plans/Directives: Advanced Directive - On File    My Medical and Care Information  Problem List   Patient Active Problem List   Diagnosis     Peptic ulcer disease     Gastric bypass status for obesity     Chronic abdominal pain     Vomiting     Anemia     ADHD (attention deficit hyperactivity disorder), inattentive type     Vitamin B12 deficiency without anemia     Thiamine deficiency     Dehydration     Dysphagia     Weight loss, non-intentional     Malnutrition (H)     Chronic anxiety     Constipation      Bile reflux esophagitis     Former smoker     Vitamin D deficiency     Iron deficiency     Health Care Home     Chronic pain     Insomnia     Positive blood culture     Fungemia     Chronic nausea     Gastrostomy tube in place (H)     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)     Short gut syndrome     Anxiety     Status post cervical spinal arthrodesis     Port or reservoir infection, initial encounter     Abnormal echocardiogram     Low serum iron     Anemia, iron deficiency     Catheter-related bloodstream infection (CRBSI)     Generalized weakness     S/P bariatric surgery     Hyperlipidemia LDL goal <130     Bacteremia     Infection by Candida species     PICC line infiltration, sequela     Gram-positive bacteremia     On total parenteral nutrition      Current Medications and Allergies:  See printed Medication Report.    Care Coordination Start Date: 1/10/2019   Frequency of Care Coordination: monthly   Form Last Updated: 10/07/2019

## 2019-10-07 NOTE — TELEPHONE ENCOUNTER
Patient requested Carafate Suspension instead of the tablets.  We ran a claim for the suspension and it comes up Product not on Formulary.  Please try to get a PA for the suspension.  Has been using the tablets by crushing them but isn't working well and still hard to swallow.    PA needed for: Carafate Suspension  Insurance: [facility] PART D  Insur phone: [phone]  Patient ID: [id number]    Thank you   -Fany John, Pharm.D., [facility], [phone]

## 2019-10-08 ENCOUNTER — HOME INFUSION (PRE-WILLOW HOME INFUSION) (OUTPATIENT)
Dept: PHARMACY | Facility: CLINIC | Age: 49
End: 2019-10-08

## 2019-10-08 LAB — COPATH REPORT: NORMAL

## 2019-10-08 RX ORDER — PANTOPRAZOLE SODIUM 40 MG/1
40 TABLET, DELAYED RELEASE ORAL DAILY
Qty: 30 TABLET | Refills: 3 | Status: SHIPPED | OUTPATIENT
Start: 2019-10-08 | End: 2020-01-12

## 2019-10-08 NOTE — TELEPHONE ENCOUNTER
Prescriptions were walked to the [facility] to be inner officed to [facility].      Taylor Lagos CMA

## 2019-10-08 NOTE — TELEPHONE ENCOUNTER
Can we get a PA for suspension, he can't take any pills, has to crush everything due to stomach surgery.

## 2019-10-09 ENCOUNTER — TELEPHONE (OUTPATIENT)
Dept: PALLIATIVE MEDICINE | Facility: CLINIC | Age: 49
End: 2019-10-09

## 2019-10-09 NOTE — PROGRESS NOTES
RN CC attempted to contact patient and inform him of Dr. Isaac's message regarding Protonix prescription.  No answer.  RN CC sent patient a "[employer] message.    Melissa Behl BSN, RN, PHN, CCM  Primary Care Clinical RN Care Coordinator  [facility]   [phone]

## 2019-10-09 NOTE — TELEPHONE ENCOUNTER
Call to Pt.     Per Dr. Milligan, one option would be for Pt to decrease his fentanyl and increase the oxycodone to twice per day.  A plan would need to be made with Pt's wife as she manages Pt's medications.    Pt and wife complained about dose change of Fentanyl being decreased if dose of Oxycodone was increased.  Saying that the doctors in the hospital didn't have any problem with the way he was using the medications there.        Pt wife stated that she would give Pt a dose in the morning and then a dose at night when she gets home.      After discussion about dose it was determined that Pt should see Dr. Milligan.  Pt scheduled for 10/16/19 at 1545.    Skinny Kim, RN  Care Coordinator   [facility] [place]

## 2019-10-09 NOTE — TELEPHONE ENCOUNTER
Medication  Was ordered daily.  Not bid or tid.  Jv forward to Dr Snowden to review and advise.    Skinny Kim, RN  Care Coordinator   [facility] [place]

## 2019-10-09 NOTE — TELEPHONE ENCOUNTER
"That is not the case.  I was directly talking with his provider, and the plan upon discharge was to go back to once daily- and she said that he agreed to going back to that dose upon discharge.  I had 3 conversations with them about this, and this is what is on his discharge summary    \"Chronic pain  Patient's prior to admission Fentanyl dose was continued upon admission, however, his Oxycodone dose was increased from 10mg daily to TID. This dose was reduced to 10mg daily upon discharge. Patient agreeable to this. He will follow-up in pain clinic. \"    If we are going up on the oxycodone, then we would come down on the fentanyl. But I would need for nursing to speak with his wife to understand how she would plan to dispense medication, because he has NOT had control over his use of oxycodone, which is why we got away from multiple doses per day. I need to know that he doesn't have control of both doses before I would consider doing this.    We talked in his appt about discussing what he wants, not telling us what he is going to do.    How much oxycodone dose he have?    Jessica Milligan MD  [facility]   "

## 2019-10-09 NOTE — PROGRESS NOTES
This is a recent snapshot of the patient's [facility] medical record.  For current drug dose and complete information and questions, call [phone] or In Basket pool, fv home infusion ([id number])  CSN Number:  [id number]

## 2019-10-09 NOTE — TELEPHONE ENCOUNTER
Patient LM at 0947 stating that he did  his fentanyl patches howeveer he will not be starting them until 10/10 due to being in the hospital. Patient also stated that he is going to have to start taking his Oxycodone in the AM and PM starting on 10/15. He states that he will  that rx on 10/14. He states that the hospital told him to take the Oxycodone 3x a day, however he said that 2x a day (AM and PM) are working for him. Phone #[phone]        Estelle Delgado    [facility]

## 2019-10-10 ENCOUNTER — MYC MEDICAL ADVICE (OUTPATIENT)
Dept: INTERNAL MEDICINE | Facility: CLINIC | Age: 49
End: 2019-10-10

## 2019-10-10 DIAGNOSIS — Z98.84 GASTRIC BYPASS STATUS FOR OBESITY: Primary | ICD-10-CM

## 2019-10-10 NOTE — PROGRESS NOTES
This is a recent snapshot of the patient's [facility] medical record.  For current drug dose and complete information and questions, call [phone] or In Basket pool, fv home infusion ([id number])  CSN Number:  [id number]

## 2019-10-10 NOTE — TELEPHONE ENCOUNTER
Prior Authorization Approval    Authorization Effective Date: 7/12/2019  Authorization Expiration Date: 10/10/2020  Medication: carafate susp- APPROVED   Approved Dose/Quantity:   Reference #:     Insurance Company: [employer] - Phone [phone] Fax [phone]  Expected CoPay:       CoPay Card Available:      Foundation Assistance Needed:    Which Pharmacy is filling the prescription (Not needed for infusion/clinic administered): [facility] [place] [address] [place] [address]  Pharmacy Notified: Yes  Patient Notified: Comment:  **Instructed pharmacy to notify patient when script is ready to /ship.**

## 2019-10-10 NOTE — TELEPHONE ENCOUNTER
Central Prior Authorization Team  Phone: [phone]    PA Initiation    Medication: carafate susp  Insurance Company: [id number] - Phone [phone] Fax [phone]  Pharmacy Filling the Rx: [facility] [place] [address] [place] [address]  Filling Pharmacy Phone: [phone]  Filling Pharmacy Fax:    Start Date: 10/10/2019

## 2019-10-11 LAB
BACTERIA SPEC CULT: ABNORMAL
SPECIMEN SOURCE: ABNORMAL

## 2019-10-15 ENCOUNTER — HOME INFUSION (PRE-WILLOW HOME INFUSION) (OUTPATIENT)
Dept: PHARMACY | Facility: CLINIC | Age: 49
End: 2019-10-15

## 2019-10-16 ENCOUNTER — TELEPHONE (OUTPATIENT)
Dept: FAMILY MEDICINE | Facility: OTHER | Age: 49
End: 2019-10-16

## 2019-10-16 NOTE — TELEPHONE ENCOUNTER
Reason for Call:  Form, our goal is to have forms completed with 72 hours, however, some forms may require a visit or additional information.    Type of letter, form or note:  medical    Who is the form from?: [facility] (if other please explain)    Where did the form come from: form was faxed in    What clinic location was the form placed at?: [facility] - [phone]    Where the form was placed: Box Box/Folder    What number is listed as a contact on the form?: [phone]       Additional comments: Sign and return fax [phone]    Call taken on 10/16/2019 at 9:55 AM by Giuseppe Mccann

## 2019-10-16 NOTE — PROGRESS NOTES
This is a recent snapshot of the patient's [facility] medical record.  For current drug dose and complete information and questions, call [phone] or In Basket pool, fv home infusion ([id number])  CSN Number:  [id number]

## 2019-10-17 LAB
ALBUMIN SERPL-MCNC: 3.3 G/DL (ref 3.4–5)
ALP SERPL-CCNC: 76 U/L (ref 40–150)
ALT SERPL W P-5'-P-CCNC: 16 U/L (ref 0–70)
ANION GAP SERPL CALCULATED.3IONS-SCNC: 7 MMOL/L (ref 3–14)
AST SERPL W P-5'-P-CCNC: 14 U/L (ref 0–45)
BASOPHILS # BLD AUTO: 0 10E9/L (ref 0–0.2)
BASOPHILS NFR BLD AUTO: 0.5 %
BILIRUB DIRECT SERPL-MCNC: 0.1 MG/DL (ref 0–0.2)
BILIRUB SERPL-MCNC: 0.4 MG/DL (ref 0.2–1.3)
BUN SERPL-MCNC: 12 MG/DL (ref 7–30)
CALCIUM SERPL-MCNC: 8.3 MG/DL (ref 8.5–10.1)
CHLORIDE SERPL-SCNC: 107 MMOL/L (ref 94–109)
CO2 SERPL-SCNC: 26 MMOL/L (ref 20–32)
CREAT SERPL-MCNC: 0.84 MG/DL (ref 0.66–1.25)
DIFFERENTIAL METHOD BLD: ABNORMAL
EOSINOPHIL # BLD AUTO: 0.2 10E9/L (ref 0–0.7)
EOSINOPHIL NFR BLD AUTO: 3.5 %
ERYTHROCYTE [DISTWIDTH] IN BLOOD BY AUTOMATED COUNT: 14.2 % (ref 10–15)
GFR SERPL CREATININE-BSD FRML MDRD: >90 ML/MIN/{1.73_M2}
GLUCOSE SERPL-MCNC: 66 MG/DL (ref 70–99)
HCT VFR BLD AUTO: 35.9 % (ref 40–53)
HGB BLD-MCNC: 11.6 G/DL (ref 13.3–17.7)
IMM GRANULOCYTES # BLD: 0 10E9/L (ref 0–0.4)
IMM GRANULOCYTES NFR BLD: 0.2 %
LYMPHOCYTES # BLD AUTO: 1.4 10E9/L (ref 0.8–5.3)
LYMPHOCYTES NFR BLD AUTO: 21.6 %
MAGNESIUM SERPL-MCNC: 2 MG/DL (ref 1.6–2.3)
MCH RBC QN AUTO: 31 PG (ref 26.5–33)
MCHC RBC AUTO-ENTMCNC: 32.3 G/DL (ref 31.5–36.5)
MCV RBC AUTO: 96 FL (ref 78–100)
MONOCYTES # BLD AUTO: 0.8 10E9/L (ref 0–1.3)
MONOCYTES NFR BLD AUTO: 12.5 %
NEUTROPHILS # BLD AUTO: 4.1 10E9/L (ref 1.6–8.3)
NEUTROPHILS NFR BLD AUTO: 61.7 %
NRBC # BLD AUTO: 0 10*3/UL
NRBC BLD AUTO-RTO: 0 /100
PHOSPHATE SERPL-MCNC: 2.9 MG/DL (ref 2.5–4.5)
PLATELET # BLD AUTO: 173 10E9/L (ref 150–450)
POTASSIUM SERPL-SCNC: 3.8 MMOL/L (ref 3.4–5.3)
PROT SERPL-MCNC: 6.6 G/DL (ref 6.8–8.8)
RBC # BLD AUTO: 3.74 10E12/L (ref 4.4–5.9)
SODIUM SERPL-SCNC: 140 MMOL/L (ref 133–144)
TRIGL SERPL-MCNC: 64 MG/DL
WBC # BLD AUTO: 6.7 10E9/L (ref 4–11)

## 2019-10-17 PROCEDURE — 82248 BILIRUBIN DIRECT: CPT | Performed by: SURGERY

## 2019-10-17 PROCEDURE — 80053 COMPREHEN METABOLIC PANEL: CPT | Performed by: SURGERY

## 2019-10-17 PROCEDURE — 84478 ASSAY OF TRIGLYCERIDES: CPT | Performed by: SURGERY

## 2019-10-17 PROCEDURE — 85025 COMPLETE CBC W/AUTO DIFF WBC: CPT | Performed by: SURGERY

## 2019-10-17 PROCEDURE — 84100 ASSAY OF PHOSPHORUS: CPT | Performed by: SURGERY

## 2019-10-17 PROCEDURE — 87040 BLOOD CULTURE FOR BACTERIA: CPT | Performed by: SURGERY

## 2019-10-17 PROCEDURE — 83735 ASSAY OF MAGNESIUM: CPT | Performed by: SURGERY

## 2019-10-18 ENCOUNTER — HOME INFUSION (PRE-WILLOW HOME INFUSION) (OUTPATIENT)
Dept: PHARMACY | Facility: CLINIC | Age: 49
End: 2019-10-18

## 2019-10-21 DIAGNOSIS — Z53.9 DIAGNOSIS NOT YET DEFINED: Primary | ICD-10-CM

## 2019-10-21 PROCEDURE — G0179 MD RECERTIFICATION HHA PT: HCPCS | Performed by: INTERNAL MEDICINE

## 2019-10-21 NOTE — ANESTHESIA CARE TRANSFER NOTE
Patient: Parker Acevedo    Procedure(s):  Upper Endoscopy    Diagnosis: * No pre-op diagnosis entered *  Diagnosis Additional Information: No value filed.    Anesthesia Type:   General     Note:  Airway :Nasal Cannula  Patient transferred to:PACU  Comments: Anesthesia Care Transfer Note      Transfer to:  PACU    Patient Vital Signs:  Stable    Airway:  None    Patient transported to PACU with supplemental O2.  Patient alert and breathing comfortably.  VSS.  Care transferred with report to PACU RN.    Jasiel Chavez CRNAHandoff Report: Identifed the Patient, Identified the Reponsible Provider, Reviewed the pertinent medical history, Discussed the surgical course, Reviewed Intra-OP anesthesia mangement and issues during anesthesia, Set expectations for post-procedure period and Allowed opportunity for questions and acknowledgement of understanding      Vitals: (Last set prior to Anesthesia Care Transfer)    CRNA VITALS  10/3/2019 1408 - 10/3/2019 1448      10/3/2019             Resp Rate (observed):  (!) 3                Electronically Signed By: SAILAJA Lucero CRNA  October 3, 2019  2:48 PM   no

## 2019-10-21 NOTE — PROGRESS NOTES
This is a recent snapshot of the patient's [facility] medical record.  For current drug dose and complete information and questions, call [phone] or In Basket pool, fv home infusion ([id number])  CSN Number:  [id number]

## 2019-10-22 ENCOUNTER — HOSPITAL ENCOUNTER (OUTPATIENT)
Dept: LAB | Facility: CLINIC | Age: 49
Discharge: HOME | End: 2019-10-22
Attending: SURGERY | Admitting: SURGERY
Payer: COMMERCIAL

## 2019-10-22 ENCOUNTER — MEDICAL CORRESPONDENCE (OUTPATIENT)
Dept: HEALTH INFORMATION MANAGEMENT | Facility: CLINIC | Age: 49
End: 2019-10-22

## 2019-10-22 ENCOUNTER — HOME INFUSION (PRE-WILLOW HOME INFUSION) (OUTPATIENT)
Dept: PHARMACY | Facility: CLINIC | Age: 49
End: 2019-10-22

## 2019-10-22 DIAGNOSIS — E46 MALNUTRITION, UNSPECIFIED TYPE (H): Primary | ICD-10-CM

## 2019-10-22 LAB
ALBUMIN SERPL-MCNC: 3.2 G/DL (ref 3.4–5)
ALP SERPL-CCNC: 79 U/L (ref 40–150)
ALT SERPL W P-5'-P-CCNC: 21 U/L (ref 0–70)
ANION GAP SERPL CALCULATED.3IONS-SCNC: 4 MMOL/L (ref 3–14)
AST SERPL W P-5'-P-CCNC: 15 U/L (ref 0–45)
BILIRUB DIRECT SERPL-MCNC: <0.1 MG/DL (ref 0–0.2)
BILIRUB SERPL-MCNC: 0.3 MG/DL (ref 0.2–1.3)
BUN SERPL-MCNC: 12 MG/DL (ref 7–30)
CALCIUM SERPL-MCNC: 7.9 MG/DL (ref 8.5–10.1)
CHLORIDE SERPL-SCNC: 113 MMOL/L (ref 94–109)
CO2 SERPL-SCNC: 28 MMOL/L (ref 20–32)
CREAT SERPL-MCNC: 0.65 MG/DL (ref 0.66–1.25)
DIFFERENTIAL METHOD BLD: NORMAL
ERYTHROCYTE [DISTWIDTH] IN BLOOD BY AUTOMATED COUNT: NORMAL % (ref 10–15)
GFR SERPL CREATININE-BSD FRML MDRD: >90 ML/MIN/{1.73_M2}
GLUCOSE SERPL-MCNC: 72 MG/DL (ref 70–99)
HCT VFR BLD AUTO: NORMAL % (ref 40–53)
HGB BLD-MCNC: NORMAL G/DL (ref 13.3–17.7)
MAGNESIUM SERPL-MCNC: 1.9 MG/DL (ref 1.6–2.3)
MCH RBC QN AUTO: NORMAL PG (ref 26.5–33)
MCHC RBC AUTO-ENTMCNC: NORMAL G/DL (ref 31.5–36.5)
MCV RBC AUTO: NORMAL FL (ref 78–100)
PHOSPHATE SERPL-MCNC: 3.4 MG/DL (ref 2.5–4.5)
PLATELET # BLD AUTO: NORMAL 10E9/L (ref 150–450)
POTASSIUM SERPL-SCNC: 3.7 MMOL/L (ref 3.4–5.3)
PROT SERPL-MCNC: 6.5 G/DL (ref 6.8–8.8)
RBC # BLD AUTO: NORMAL 10E12/L (ref 4.4–5.9)
SODIUM SERPL-SCNC: 145 MMOL/L (ref 133–144)
TRIGL SERPL-MCNC: 93 MG/DL
WBC # BLD AUTO: NORMAL 10E9/L (ref 4–11)

## 2019-10-22 PROCEDURE — 83735 ASSAY OF MAGNESIUM: CPT | Performed by: SURGERY

## 2019-10-22 PROCEDURE — 85025 COMPLETE CBC W/AUTO DIFF WBC: CPT | Performed by: SURGERY

## 2019-10-22 PROCEDURE — 84100 ASSAY OF PHOSPHORUS: CPT | Performed by: SURGERY

## 2019-10-22 PROCEDURE — 84478 ASSAY OF TRIGLYCERIDES: CPT | Performed by: SURGERY

## 2019-10-22 PROCEDURE — 80053 COMPREHEN METABOLIC PANEL: CPT | Performed by: SURGERY

## 2019-10-22 PROCEDURE — 82248 BILIRUBIN DIRECT: CPT | Performed by: SURGERY

## 2019-10-23 ENCOUNTER — TELEPHONE (OUTPATIENT)
Dept: INTERNAL MEDICINE | Facility: CLINIC | Age: 49
End: 2019-10-23

## 2019-10-23 NOTE — TELEPHONE ENCOUNTER
Reason for Call:  Form, our goal is to have forms completed with 72 hours, however, some forms may require a visit or additional information.    Type of letter, form or note:  Home Health Certification    Who is the form from?: Home care    Where did the form come from: form was faxed in    What clinic location was the form placed at?: [facility]    Where the form was placed: Given to MA/RN    What number is listed as a contact on the form?:        Additional comments:     Call taken on 10/23/2019 at 11:24 AM by Teetee Parada

## 2019-10-23 NOTE — PROGRESS NOTES
This is a recent snapshot of the patient's [facility] medical record.  For current drug dose and complete information and questions, call [phone] or In Basket pool, fv home infusion ([id number])  CSN Number:  [id number]

## 2019-10-25 ENCOUNTER — HOME INFUSION (PRE-WILLOW HOME INFUSION) (OUTPATIENT)
Dept: PHARMACY | Facility: CLINIC | Age: 49
End: 2019-10-25

## 2019-10-28 NOTE — PROGRESS NOTES
This is a recent snapshot of the patient's [facility] medical record.  For current drug dose and complete information and questions, call [phone] or In Basket pool, fv home infusion ([id number])  CSN Number:  [id number]

## 2019-10-29 ENCOUNTER — MYC REFILL (OUTPATIENT)
Dept: PALLIATIVE MEDICINE | Facility: CLINIC | Age: 49
End: 2019-10-29

## 2019-10-29 DIAGNOSIS — G89.29 CHRONIC ABDOMINAL PAIN: ICD-10-CM

## 2019-10-29 DIAGNOSIS — E43 SEVERE MALNUTRITION (H): ICD-10-CM

## 2019-10-29 DIAGNOSIS — G89.4 CHRONIC PAIN SYNDROME: ICD-10-CM

## 2019-10-29 DIAGNOSIS — R10.9 CHRONIC ABDOMINAL PAIN: ICD-10-CM

## 2019-10-29 LAB
ALBUMIN SERPL-MCNC: 3.4 G/DL (ref 3.4–5)
ALP SERPL-CCNC: 92 U/L (ref 40–150)
ALT SERPL W P-5'-P-CCNC: 20 U/L (ref 0–70)
ANION GAP SERPL CALCULATED.3IONS-SCNC: 6 MMOL/L (ref 3–14)
AST SERPL W P-5'-P-CCNC: 11 U/L (ref 0–45)
BILIRUB DIRECT SERPL-MCNC: 0.1 MG/DL (ref 0–0.2)
BILIRUB SERPL-MCNC: 0.3 MG/DL (ref 0.2–1.3)
BUN SERPL-MCNC: 9 MG/DL (ref 7–30)
CALCIUM SERPL-MCNC: 8.3 MG/DL (ref 8.5–10.1)
CHLORIDE SERPL-SCNC: 109 MMOL/L (ref 94–109)
CO2 SERPL-SCNC: 28 MMOL/L (ref 20–32)
CREAT SERPL-MCNC: 0.67 MG/DL (ref 0.66–1.25)
CRP SERPL-MCNC: <2.9 MG/L (ref 0–8)
FERRITIN SERPL-MCNC: 14 NG/ML (ref 26–388)
GFR SERPL CREATININE-BSD FRML MDRD: >90 ML/MIN/{1.73_M2}
GLUCOSE SERPL-MCNC: 66 MG/DL (ref 70–99)
MAGNESIUM SERPL-MCNC: 2.4 MG/DL (ref 1.6–2.3)
PHOSPHATE SERPL-MCNC: 2.8 MG/DL (ref 2.5–4.5)
POTASSIUM SERPL-SCNC: 3.7 MMOL/L (ref 3.4–5.3)
PROT SERPL-MCNC: 6.6 G/DL (ref 6.8–8.8)
SODIUM SERPL-SCNC: 142 MMOL/L (ref 133–144)
TRIGL SERPL-MCNC: 176 MG/DL

## 2019-10-29 PROCEDURE — 86140 C-REACTIVE PROTEIN: CPT | Performed by: SURGERY

## 2019-10-29 PROCEDURE — 82248 BILIRUBIN DIRECT: CPT | Performed by: SURGERY

## 2019-10-29 PROCEDURE — 84100 ASSAY OF PHOSPHORUS: CPT | Performed by: SURGERY

## 2019-10-29 PROCEDURE — 87040 BLOOD CULTURE FOR BACTERIA: CPT | Performed by: SURGERY

## 2019-10-29 PROCEDURE — 80053 COMPREHEN METABOLIC PANEL: CPT | Performed by: SURGERY

## 2019-10-29 PROCEDURE — 83735 ASSAY OF MAGNESIUM: CPT | Performed by: SURGERY

## 2019-10-29 PROCEDURE — 84478 ASSAY OF TRIGLYCERIDES: CPT | Performed by: SURGERY

## 2019-10-29 PROCEDURE — 82728 ASSAY OF FERRITIN: CPT | Performed by: SURGERY

## 2019-10-29 RX ORDER — FENTANYL 25 UG/1
1 PATCH TRANSDERMAL
Qty: 15 PATCH | Refills: 0 | Status: CANCELLED | OUTPATIENT
Start: 2019-10-29

## 2019-10-29 RX ORDER — FENTANYL 25 UG/1
1 PATCH TRANSDERMAL
Qty: 15 PATCH | Refills: 0 | Status: SHIPPED | OUTPATIENT
Start: 2019-10-29 | End: 2019-11-25

## 2019-10-29 NOTE — TELEPHONE ENCOUNTER
Medication refill information reviewed.     Due date for fentaNYL (DURAGESIC) 25 mcg/hr 72 hr patch  is 11/7/19     Prescriptions prepped for review.     Will route to provider.     The other medications were requested from a different provider.

## 2019-10-29 NOTE — TELEPHONE ENCOUNTER
Patient requesting refill(s) of fentaNYL (DURAGESIC) 25 mcg/hr 72 hr patch   Last picked up from pharmacy on 10/8/19    amphetamine-dextroamphetamine (ADDERALL) 20 MG tablet  Last picked up from pharmacy on 10/08/19    ondansetron (ZOFRAN-ODT) 8 MG ODT tab  Last refills 9/27/19    LORazepam (ATIVAN) 1 MG tablet  Last refills 10/7/19    Pt last seen by prescribing provider on 9/11/19  Next appt scheduled for 11/13/19     checked in the past 6 months? Yes If no, print current report and give to RN    Last urine drug screen date 9/11/19  Current opioid agreement on file (completed within the last year) Yes Date of opioid agreement: 9/11/19    Processing (pick one and delete the others):      E-prescribe to [facility] [place], [place] pharmacy    Will route to nursing pool for review and preparation of prescription(s).

## 2019-10-29 NOTE — TELEPHONE ENCOUNTER
Script Eprescribed to pharmacy    Will send this to MA team to notify patient.    Signed Prescriptions:                        Disp   Refills    fentaNYL (DURAGESIC) 25 mcg/hr 72 hr patch 15 pat*0        Sig: Place 1 patch onto the skin every 48 hours remove old           patch.   May fill 11/5/19 and start 11/7/19.  Authorizing Provider: BRANDYN JENKINS MD  [facility]

## 2019-10-30 ENCOUNTER — HOME INFUSION (PRE-WILLOW HOME INFUSION) (OUTPATIENT)
Dept: PHARMACY | Facility: CLINIC | Age: 49
End: 2019-10-30

## 2019-10-30 LAB
ALBUMIN SERPL-MCNC: 3.2 G/DL (ref 3.4–5)
ALP SERPL-CCNC: 95 U/L (ref 40–150)
ALT SERPL W P-5'-P-CCNC: 17 U/L (ref 0–70)
ANION GAP SERPL CALCULATED.3IONS-SCNC: 10 MMOL/L (ref 3–14)
AST SERPL W P-5'-P-CCNC: 11 U/L (ref 0–45)
BASOPHILS # BLD AUTO: 0.1 10E9/L (ref 0–0.2)
BASOPHILS NFR BLD AUTO: 0.6 %
BILIRUB DIRECT SERPL-MCNC: 0.1 MG/DL (ref 0–0.2)
BILIRUB SERPL-MCNC: 0.4 MG/DL (ref 0.2–1.3)
BUN SERPL-MCNC: 13 MG/DL (ref 7–30)
CALCIUM SERPL-MCNC: 8.2 MG/DL (ref 8.5–10.1)
CHLORIDE SERPL-SCNC: 109 MMOL/L (ref 94–109)
CO2 SERPL-SCNC: 23 MMOL/L (ref 20–32)
CREAT SERPL-MCNC: 0.88 MG/DL (ref 0.66–1.25)
CRP SERPL-MCNC: <2.9 MG/L (ref 0–8)
DIFFERENTIAL METHOD BLD: ABNORMAL
EOSINOPHIL # BLD AUTO: 0.1 10E9/L (ref 0–0.7)
EOSINOPHIL NFR BLD AUTO: 1.2 %
ERYTHROCYTE [DISTWIDTH] IN BLOOD BY AUTOMATED COUNT: 16 % (ref 10–15)
FERRITIN SERPL-MCNC: 15 NG/ML (ref 26–388)
GFR SERPL CREATININE-BSD FRML MDRD: >90 ML/MIN/{1.73_M2}
GLUCOSE SERPL-MCNC: 76 MG/DL (ref 70–99)
HCT VFR BLD AUTO: 41.4 % (ref 40–53)
HGB BLD-MCNC: 13.3 G/DL (ref 13.3–17.7)
IMM GRANULOCYTES # BLD: 0.1 10E9/L (ref 0–0.4)
IMM GRANULOCYTES NFR BLD: 0.5 %
LYMPHOCYTES # BLD AUTO: 1.7 10E9/L (ref 0.8–5.3)
LYMPHOCYTES NFR BLD AUTO: 18 %
MCH RBC QN AUTO: 30.4 PG (ref 26.5–33)
MCHC RBC AUTO-ENTMCNC: 32.1 G/DL (ref 31.5–36.5)
MCV RBC AUTO: 95 FL (ref 78–100)
MONOCYTES # BLD AUTO: 1 10E9/L (ref 0–1.3)
MONOCYTES NFR BLD AUTO: 10 %
NEUTROPHILS # BLD AUTO: 6.6 10E9/L (ref 1.6–8.3)
NEUTROPHILS NFR BLD AUTO: 69.7 %
NRBC # BLD AUTO: 0 10*3/UL
NRBC BLD AUTO-RTO: 0 /100
PHOSPHATE SERPL-MCNC: 3.2 MG/DL (ref 2.5–4.5)
PLATELET # BLD AUTO: 185 10E9/L (ref 150–450)
PLATELET # BLD EST: ABNORMAL 10*3/UL
POTASSIUM SERPL-SCNC: 3.5 MMOL/L (ref 3.4–5.3)
PROT SERPL-MCNC: 6.8 G/DL (ref 6.8–8.8)
RBC # BLD AUTO: 4.38 10E12/L (ref 4.4–5.9)
SODIUM SERPL-SCNC: 142 MMOL/L (ref 133–144)
TRIGL SERPL-MCNC: 185 MG/DL
WBC # BLD AUTO: 9.5 10E9/L (ref 4–11)

## 2019-10-30 PROCEDURE — 83735 ASSAY OF MAGNESIUM: CPT | Performed by: SURGERY

## 2019-10-30 PROCEDURE — 87077 CULTURE AEROBIC IDENTIFY: CPT | Performed by: SURGERY

## 2019-10-30 PROCEDURE — 82248 BILIRUBIN DIRECT: CPT | Performed by: SURGERY

## 2019-10-30 PROCEDURE — 84478 ASSAY OF TRIGLYCERIDES: CPT | Performed by: SURGERY

## 2019-10-30 PROCEDURE — 82525 ASSAY OF COPPER: CPT | Performed by: SURGERY

## 2019-10-30 PROCEDURE — 87181 SC STD AGAR DILUTION PER AGT: CPT | Performed by: SURGERY

## 2019-10-30 PROCEDURE — 80053 COMPREHEN METABOLIC PANEL: CPT | Performed by: SURGERY

## 2019-10-30 PROCEDURE — 84630 ASSAY OF ZINC: CPT | Performed by: SURGERY

## 2019-10-30 PROCEDURE — 82728 ASSAY OF FERRITIN: CPT | Performed by: SURGERY

## 2019-10-30 PROCEDURE — 85025 COMPLETE CBC W/AUTO DIFF WBC: CPT | Performed by: SURGERY

## 2019-10-30 PROCEDURE — 87800 DETECT AGNT MULT DNA DIREC: CPT | Performed by: SURGERY

## 2019-10-30 PROCEDURE — 87040 BLOOD CULTURE FOR BACTERIA: CPT | Performed by: SURGERY

## 2019-10-30 PROCEDURE — 86140 C-REACTIVE PROTEIN: CPT | Performed by: SURGERY

## 2019-10-30 PROCEDURE — 84255 ASSAY OF SELENIUM: CPT | Performed by: SURGERY

## 2019-10-30 PROCEDURE — 87076 CULTURE ANAEROBE IDENT EACH: CPT | Performed by: SURGERY

## 2019-10-30 PROCEDURE — 84100 ASSAY OF PHOSPHORUS: CPT | Performed by: SURGERY

## 2019-10-30 PROCEDURE — 83785 ASSAY OF MANGANESE: CPT | Performed by: SURGERY

## 2019-10-31 ENCOUNTER — PATIENT OUTREACH (OUTPATIENT)
Dept: CARE COORDINATION | Facility: CLINIC | Age: 49
End: 2019-10-31

## 2019-10-31 ENCOUNTER — MEDICAL CORRESPONDENCE (OUTPATIENT)
Dept: HEALTH INFORMATION MANAGEMENT | Facility: CLINIC | Age: 49
End: 2019-10-31

## 2019-10-31 ENCOUNTER — HOME INFUSION (PRE-WILLOW HOME INFUSION) (OUTPATIENT)
Dept: PHARMACY | Facility: CLINIC | Age: 49
End: 2019-10-31

## 2019-10-31 DIAGNOSIS — Z98.84 BARIATRIC SURGERY STATUS: ICD-10-CM

## 2019-10-31 DIAGNOSIS — R13.10 DYSPHAGIA, UNSPECIFIED: Primary | ICD-10-CM

## 2019-10-31 DIAGNOSIS — R11.11 VOMITING WITHOUT NAUSEA: ICD-10-CM

## 2019-10-31 LAB
MAGNESIUM SERPL-MCNC: 2.2 MG/DL (ref 1.6–2.3)
MANGANESE BLD-MCNC: NORMAL NG/ML

## 2019-10-31 NOTE — PROGRESS NOTES
This is a recent snapshot of the patient's [facility] medical record.  For current drug dose and complete information and questions, call [phone] or In Basket pool, fv home infusion ([id number])  CSN Number:  [id number]

## 2019-10-31 NOTE — PROGRESS NOTES
Clinic Care Coordination Contact  [facility]/Voicemail       Clinical Data: Care Coordinator Outreach  Outreach attempted x 1.  Left message on patient's voicemail with call back information and requested return call.  Plan: Care Coordinator will try to reach patient again in approximately 10 business days.    Melissa Behl BSN, RN, PHN, [facility]  Primary Care Clinical RN Care Coordinator  [facility]   [phone]

## 2019-11-01 ENCOUNTER — NURSE TRIAGE (OUTPATIENT)
Dept: NURSING | Facility: CLINIC | Age: 49
End: 2019-11-01

## 2019-11-01 ENCOUNTER — TELEPHONE (OUTPATIENT)
Dept: INFECTIOUS DISEASES | Facility: CLINIC | Age: 49
End: 2019-11-01

## 2019-11-01 ENCOUNTER — HOME INFUSION (PRE-WILLOW HOME INFUSION) (OUTPATIENT)
Dept: PHARMACY | Facility: CLINIC | Age: 49
End: 2019-11-01

## 2019-11-01 DIAGNOSIS — F90.0 ADHD (ATTENTION DEFICIT HYPERACTIVITY DISORDER), INATTENTIVE TYPE: ICD-10-CM

## 2019-11-01 NOTE — TELEPHONE ENCOUNTER
Adderall       Last Written Prescription Date:  10/7/19  Last Fill Quantity: 30,   # refills: 0  Last Office Visit: 8/19/19  Future Office visit:    Next 5 appointments (look out 90 days)    Nov 13, 2019  1:45 PM CST  Return Visit with Patti Milligan MD  [facility] ([facility]) [address] [facility] [address]  [phone]   Nov 25, 2019  1:30 PM CST  [facility] [address] [place] [address] [phone]           Routing refill request to provider for review/approval because:  Drug not on the [facility], [facility] or [facility] refill protocol or controlled substance  Lorazepam 1 MG       Last Written Prescription Date:  10/7/19  Last Fill Quantity: 50,   # refills: 0  Last Office Visit: 8/19/19  Future Office visit:    Next 5 appointments (look out 90 days)    Nov 13, 2019  1:45 PM CST  Return Visit with Patti Milligan MD  [facility] ([facility]) [address] [facility] [address]  [phone]   Nov 25, 2019  1:30 PM CST  Office Visit with Chuy Isaac MD  [facility] ([address] [address] [phone]           Routing refill request to provider for review/approval because:  Drug not on the [facility], [facility] or [facility] refill protocol or controlled substance

## 2019-11-01 NOTE — PROGRESS NOTES
This is a recent snapshot of the patient's [facility] medical record.  For current drug dose and complete information and questions, call [phone] or In Basket pool, fv home infusion ([id number])  CSN Number:  [id number]

## 2019-11-02 ENCOUNTER — HOSPITAL ENCOUNTER (OUTPATIENT)
Facility: CLINIC | Age: 49
Setting detail: OBSERVATION
Discharge: HOME | End: 2019-11-04
Attending: EMERGENCY MEDICINE | Admitting: INTERNAL MEDICINE
Payer: COMMERCIAL

## 2019-11-02 ENCOUNTER — APPOINTMENT (OUTPATIENT)
Dept: GENERAL RADIOLOGY | Facility: CLINIC | Age: 49
End: 2019-11-02
Attending: EMERGENCY MEDICINE
Payer: COMMERCIAL

## 2019-11-02 ENCOUNTER — NURSE TRIAGE (OUTPATIENT)
Dept: NURSING | Facility: CLINIC | Age: 49
End: 2019-11-02

## 2019-11-02 DIAGNOSIS — R50.9 FEVER AND CHILLS: ICD-10-CM

## 2019-11-02 DIAGNOSIS — Z78.9 ON TOTAL PARENTERAL NUTRITION: Primary | ICD-10-CM

## 2019-11-02 LAB
ANION GAP SERPL CALCULATED.3IONS-SCNC: 4 MMOL/L (ref 3–14)
BASOPHILS # BLD AUTO: 0.1 10E9/L (ref 0–0.2)
BASOPHILS NFR BLD AUTO: 0.6 %
BUN SERPL-MCNC: 12 MG/DL (ref 7–30)
CALCIUM SERPL-MCNC: 8.5 MG/DL (ref 8.5–10.1)
CHLORIDE SERPL-SCNC: 108 MMOL/L (ref 94–109)
CO2 SERPL-SCNC: 28 MMOL/L (ref 20–32)
COPPER SERPL-MCNC: 123.2 UG/DL (ref 70–140)
CREAT SERPL-MCNC: 0.67 MG/DL (ref 0.66–1.25)
CRP SERPL-MCNC: <2.9 MG/L (ref 0–8)
DIFFERENTIAL METHOD BLD: ABNORMAL
EOSINOPHIL # BLD AUTO: 0.1 10E9/L (ref 0–0.7)
EOSINOPHIL NFR BLD AUTO: 1.2 %
ERYTHROCYTE [DISTWIDTH] IN BLOOD BY AUTOMATED COUNT: 15.2 % (ref 10–15)
GFR SERPL CREATININE-BSD FRML MDRD: >90 ML/MIN/{1.73_M2}
GLUCOSE SERPL-MCNC: 103 MG/DL (ref 70–99)
HCT VFR BLD AUTO: 42.4 % (ref 40–53)
HGB BLD-MCNC: 13.3 G/DL (ref 13.3–17.7)
IMM GRANULOCYTES # BLD: 0 10E9/L (ref 0–0.4)
IMM GRANULOCYTES NFR BLD: 0.4 %
LACTATE BLD-SCNC: 1.5 MMOL/L (ref 0.7–2)
LYMPHOCYTES # BLD AUTO: 2.7 10E9/L (ref 0.8–5.3)
LYMPHOCYTES NFR BLD AUTO: 26.4 %
MCH RBC QN AUTO: 30 PG (ref 26.5–33)
MCHC RBC AUTO-ENTMCNC: 31.4 G/DL (ref 31.5–36.5)
MCV RBC AUTO: 96 FL (ref 78–100)
MONOCYTES # BLD AUTO: 1.2 10E9/L (ref 0–1.3)
MONOCYTES NFR BLD AUTO: 11.5 %
NEUTROPHILS # BLD AUTO: 6.2 10E9/L (ref 1.6–8.3)
NEUTROPHILS NFR BLD AUTO: 59.9 %
NRBC # BLD AUTO: 0 10*3/UL
NRBC BLD AUTO-RTO: 0 /100
PLATELET # BLD AUTO: 179 10E9/L (ref 150–450)
POTASSIUM SERPL-SCNC: 3.9 MMOL/L (ref 3.4–5.3)
RBC # BLD AUTO: 4.43 10E12/L (ref 4.4–5.9)
SELENIUM SERPL-MCNC: 129 UG/L (ref 23–190)
SODIUM SERPL-SCNC: 140 MMOL/L (ref 133–144)
WBC # BLD AUTO: 10.4 10E9/L (ref 4–11)
ZINC SERPL-MCNC: 67 UG/DL (ref 60–120)

## 2019-11-02 PROCEDURE — 71046 X-RAY EXAM CHEST 2 VIEWS: CPT

## 2019-11-02 PROCEDURE — 25000128 H RX IP 250 OP 636: Performed by: EMERGENCY MEDICINE

## 2019-11-02 PROCEDURE — 99285 EMERGENCY DEPT VISIT HI MDM: CPT | Mod: 25 | Performed by: EMERGENCY MEDICINE

## 2019-11-02 PROCEDURE — 96374 THER/PROPH/DIAG INJ IV PUSH: CPT | Performed by: EMERGENCY MEDICINE

## 2019-11-02 PROCEDURE — 99207 ZZC APP CREDIT; MD BILLING SHARED VISIT: CPT | Performed by: PHYSICIAN ASSISTANT

## 2019-11-02 PROCEDURE — 85025 COMPLETE CBC W/AUTO DIFF WBC: CPT | Performed by: EMERGENCY MEDICINE

## 2019-11-02 PROCEDURE — 99284 EMERGENCY DEPT VISIT MOD MDM: CPT | Mod: Z6 | Performed by: EMERGENCY MEDICINE

## 2019-11-02 PROCEDURE — 86140 C-REACTIVE PROTEIN: CPT | Performed by: EMERGENCY MEDICINE

## 2019-11-02 PROCEDURE — 83605 ASSAY OF LACTIC ACID: CPT | Performed by: EMERGENCY MEDICINE

## 2019-11-02 PROCEDURE — G0378 HOSPITAL OBSERVATION PER HR: HCPCS

## 2019-11-02 PROCEDURE — 25000132 ZZH RX MED GY IP 250 OP 250 PS 637: Performed by: EMERGENCY MEDICINE

## 2019-11-02 PROCEDURE — 99220 ZZC INITIAL OBSERVATION CARE,LEVL III: CPT | Mod: AI | Performed by: INTERNAL MEDICINE

## 2019-11-02 PROCEDURE — 87040 BLOOD CULTURE FOR BACTERIA: CPT | Performed by: EMERGENCY MEDICINE

## 2019-11-02 PROCEDURE — 80048 BASIC METABOLIC PNL TOTAL CA: CPT | Performed by: EMERGENCY MEDICINE

## 2019-11-02 PROCEDURE — 87631 RESP VIRUS 3-5 TARGETS: CPT | Performed by: PHYSICIAN ASSISTANT

## 2019-11-02 RX ORDER — HEPARIN SODIUM,PORCINE 10 UNIT/ML
5-10 VIAL (ML) INTRAVENOUS EVERY 24 HOURS
Status: DISCONTINUED | OUTPATIENT
Start: 2019-11-02 | End: 2019-11-04 | Stop reason: HOSPADM

## 2019-11-02 RX ORDER — PANTOPRAZOLE SODIUM 40 MG/1
40 TABLET, DELAYED RELEASE ORAL DAILY
Status: DISCONTINUED | OUTPATIENT
Start: 2019-11-03 | End: 2019-11-04 | Stop reason: HOSPADM

## 2019-11-02 RX ORDER — DEXTROAMPHETAMINE SACCHARATE, AMPHETAMINE ASPARTATE, DEXTROAMPHETAMINE SULFATE AND AMPHETAMINE SULFATE 2.5; 2.5; 2.5; 2.5 MG/1; MG/1; MG/1; MG/1
20 TABLET ORAL DAILY
Status: DISCONTINUED | OUTPATIENT
Start: 2019-11-03 | End: 2019-11-04 | Stop reason: HOSPADM

## 2019-11-02 RX ORDER — ONDANSETRON 4 MG/1
4 TABLET, ORALLY DISINTEGRATING ORAL EVERY 6 HOURS PRN
Status: DISCONTINUED | OUTPATIENT
Start: 2019-11-02 | End: 2019-11-04 | Stop reason: HOSPADM

## 2019-11-02 RX ORDER — CLINDAMYCIN PHOSPHATE 900 MG/50ML
900 INJECTION, SOLUTION INTRAVENOUS EVERY 8 HOURS
Status: DISCONTINUED | OUTPATIENT
Start: 2019-11-02 | End: 2019-11-02

## 2019-11-02 RX ORDER — OXYCODONE HCL 5 MG/5 ML
5 SOLUTION, ORAL ORAL EVERY 4 HOURS PRN
Status: DISCONTINUED | OUTPATIENT
Start: 2019-11-02 | End: 2019-11-02

## 2019-11-02 RX ORDER — FENTANYL 25 UG/1
25 PATCH TRANSDERMAL
Status: DISCONTINUED | OUTPATIENT
Start: 2019-11-03 | End: 2019-11-04 | Stop reason: HOSPADM

## 2019-11-02 RX ORDER — ONDANSETRON 2 MG/ML
4 INJECTION INTRAMUSCULAR; INTRAVENOUS EVERY 6 HOURS PRN
Status: DISCONTINUED | OUTPATIENT
Start: 2019-11-02 | End: 2019-11-04 | Stop reason: HOSPADM

## 2019-11-02 RX ORDER — HEPARIN SODIUM,PORCINE 10 UNIT/ML
5-10 VIAL (ML) INTRAVENOUS
Status: DISCONTINUED | OUTPATIENT
Start: 2019-11-02 | End: 2019-11-04 | Stop reason: HOSPADM

## 2019-11-02 RX ORDER — LIDOCAINE 40 MG/G
CREAM TOPICAL
Status: DISCONTINUED | OUTPATIENT
Start: 2019-11-02 | End: 2019-11-04 | Stop reason: HOSPADM

## 2019-11-02 RX ORDER — OXYCODONE HCL 5 MG/5 ML
10 SOLUTION, ORAL ORAL EVERY 6 HOURS PRN
Status: DISCONTINUED | OUTPATIENT
Start: 2019-11-02 | End: 2019-11-04 | Stop reason: HOSPADM

## 2019-11-02 RX ORDER — CARVEDILOL 6.25 MG/1
6.25 TABLET ORAL 2 TIMES DAILY WITH MEALS
Status: DISCONTINUED | OUTPATIENT
Start: 2019-11-03 | End: 2019-11-04 | Stop reason: HOSPADM

## 2019-11-02 RX ORDER — NALOXONE HYDROCHLORIDE 0.4 MG/ML
.1-.4 INJECTION, SOLUTION INTRAMUSCULAR; INTRAVENOUS; SUBCUTANEOUS
Status: DISCONTINUED | OUTPATIENT
Start: 2019-11-02 | End: 2019-11-04 | Stop reason: HOSPADM

## 2019-11-02 RX ORDER — ONDANSETRON 2 MG/ML
4 INJECTION INTRAMUSCULAR; INTRAVENOUS EVERY 30 MIN PRN
Status: DISCONTINUED | OUTPATIENT
Start: 2019-11-02 | End: 2019-11-02

## 2019-11-02 RX ADMIN — ONDANSETRON 4 MG: 2 INJECTION INTRAMUSCULAR; INTRAVENOUS at 19:58

## 2019-11-02 RX ADMIN — OXYCODONE HYDROCHLORIDE 5 MG: 5 SOLUTION ORAL at 19:58

## 2019-11-02 ASSESSMENT — MIFFLIN-ST. JEOR: SCORE: 1723.14

## 2019-11-02 NOTE — TELEPHONE ENCOUNTER
S: Wife returning call.  B: Dr. Chantale Mendez called about blood cultures.  Instructing Parker to be seen in the ED for possible positive blood cultures and fevers.  A: Wife is concern about driving from [place] in the dark this evening to the [facility] ED.  R: She will drive Parker to the ED on Saturday morning.  Henny Castano RN, [facility]       Reason for Disposition    Health Information question, no triage required and triager able to answer question    Protocols used: INFORMATION ONLY CALL - NO TRIAGE-P-AH

## 2019-11-02 NOTE — ED PROVIDER NOTES
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 Patient states he received a call last night at 7 PM for a positive blood culture and was told to come back to the Emergency Department. Patient complains of fever at 101 and headache that started on Tuesday (10/29/2019).  Initially noted fevers only after infusion his TPN, more recently has had fevers throughout the day.  Patient reports he has a PICC line for TPN and Hydration. Patient endorses symptoms of diffuse lower back pain, fatigue, sneezing, coughing and mild abdominal pain (which is chronic). Patient states he was coughing up quality of green sputum before but he is now coughing up \"orange-yellowish\" sputum. Patient denies any urinary symptoms. Patient denies numbness, tingling or pain in his lower extremity and he is walking normally.  Denies any lower extremity complaints, no focal midline back pain.     Per chart review, there's a telephone note from 11/20/2019 from Infectious Disease which notes the patient recently had a polymicrobial infection due to Granulicatella, S. epidermidis, and Streptococcus salivarius. He completed abx course of vancomycin and ceftriaxone on 10/11. Surveillance blood cultures on 10/17 were negative. He had repeat blood cultures drawn on 10/29 (peripheral) and 10/30 (PICC and peripheral). They called his wife who reported he had fevers with T max of 102  F.     Patient had blood cultures drawn from his PICC line on 10/30/19 which grew Corynebacterium and verified by ID diagnostic laboratory. " Susceptibility testing in progress.     PAST MEDICAL HISTORY  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     Complication of anesthesia      Difficulty swallowing      Gastric ulcer, unspecified as acute or chronic, without mention of hemorrhage, perforation, or obstruction      Gastro-oesophageal reflux disease      Head injury      Hiatal hernia      Other bladder disorder      Other chronic pain      PONV (postoperative nausea and vomiting)      Severe malnutrition (H)     TPN     Short gut syndrome      Tobacco abuse      PAST SURGICAL HISTORY  Past Surgical History:   Procedure Laterality Date     AMPUTATION       APPENDECTOMY       BACK SURGERY  11/3/2014    curve in the spine     BIOPSY LYMPH NODE CERVICAL N/A 2/20/2015    Procedure: BIOPSY LYMPH NODE CERVICAL;  Surgeon: Baron Scanlon MD;  Location: [facility] OR     C GASTRIC BYPASS,OBESE<100CM SHAYLEE-EN-Y  2002    lost 300 pounds     CHOLECYSTECTOMY       DISCECTOMY, FUSION CERVICAL ANTERIOR ONE LEVEL, COMBINED N/A 2/15/2017    Procedure: COMBINED DISCECTOMY, FUSION CERVICAL ANTERIOR ONE LEVEL;  Surgeon: Darren Campos MD;  Location: [facility]     ENDOSCOPIC INSERTION TUBE GASTROSTOMY  9/9/2013    Procedure: ENDOSCOPIC INSERTION TUBE GASTROSTOMY;;  Surgeon: Francis Vyas MD;  [facility]: [facility]     ENDOSCOPIC ULTRASOUND UPPER GASTROINTESTINAL TRACT (GI)  4/29/2011    Procedure:ENDOSCOPIC ULTRASOUND UPPER GASTROINTESTINAL TRACT (GI); Both Procedures done Conjointly; Surgeon:NEREIDA HOUSER; Location:[facility]     ENDOSCOPIC ULTRASOUND UPPER GASTROINTESTINAL TRACT (GI)  9/9/2013    Procedure: ENDOSCOPIC ULTRASOUND UPPER GASTROINTESTINAL TRACT (GI);  Endoscopic Ultrasound Guide Gastrostomy Tube  Placement  C-arm;  Surgeon: Noe Lizarraga MD;  [facility]     ENDOSCOPY  03/25/11    EGD, [facility]     ENDOSCOPY  08/04/09    Upper Endoscopy, [facility]     ENDOSCOPY  01/05/09    Upper Endoscopy, [facility]     ESOPHAGOSCOPY, GASTROSCOPY, DUODENOSCOPY (EGD), COMBINED  4/20/2011    Procedure:COMBINED ESOPHAGOSCOPY, GASTROSCOPY, DUODENOSCOPY (EGD); Surgeon:FRANCIS VYAS; [facility]     ESOPHAGOSCOPY, GASTROSCOPY, DUODENOSCOPY (EGD), COMBINED  6/15/2011    Procedure:COMBINED ESOPHAGOSCOPY, GASTROSCOPY, DUODENOSCOPY (EGD); Surgeon:FRANCIS VYAS; [facility]     ESOPHAGOSCOPY, GASTROSCOPY, DUODENOSCOPY (EGD), COMBINED  6/12/2013    Procedure: COMBINED ESOPHAGOSCOPY, GASTROSCOPY, DUODENOSCOPY (EGD);;  Surgeon: Francis Vyas MD;  [facility]     ESOPHAGOSCOPY, GASTROSCOPY, DUODENOSCOPY (EGD), COMBINED  11/22/2013    Procedure: COMBINED ESOPHAGOSCOPY, GASTROSCOPY, DUODENOSCOPY (EGD);;  Surgeon: Francis Vyas MD;  [facility]     ESOPHAGOSCOPY, GASTROSCOPY, DUODENOSCOPY (EGD), COMBINED  4/30/2014    Procedure: COMBINED ESOPHAGOSCOPY, GASTROSCOPY, DUODENOSCOPY (EGD);  Surgeon: Francis Vyas MD;  [facility]     ESOPHAGOSCOPY, GASTROSCOPY, DUODENOSCOPY (EGD), COMBINED N/A 2/20/2015    Procedure: COMBINED ESOPHAGOSCOPY, GASTROSCOPY, DUODENOSCOPY (EGD), BIOPSY SINGLE OR MULTIPLE;  Surgeon: Baron Scanlon MD;  Location:  OR     ESOPHAGOSCOPY, GASTROSCOPY, DUODENOSCOPY (EGD), COMBINED N/A 9/30/2015    Procedure: COMBINED ESOPHAGOSCOPY, GASTROSCOPY, DUODENOSCOPY (EGD);  Surgeon: Francis Vyas MD;  [facility]     ESOPHAGOSCOPY, GASTROSCOPY, DUODENOSCOPY (EGD), COMBINED N/A 10/3/2019    Procedure: Upper Endoscopy;  Surgeon: Clif Morrow MD;  [facility]     GASTRECTOMY  6/22/2012    Procedure: GASTRECTOMY;  Open Approach, Excise Ulcers,Partial Gastrectomy, Esophagojejunostomy, Hiatal Hernia Repair, Extensive  Lysis of Adhesions and Esaphagogastrodudenoscopy.;  Surgeon: Francis Vyas MD;  [facility]     GASTROJEJUNOSTOMY  08/26/09    Extensice enterolysis, partial resect. jejunum, part. resect gastric pouch, gastrojejunostomy anastomosis     HC ESOPH/GAS REFLUX TEST W NASAL IMPED ELECTRODE  8/5/2013    Procedure: ESOPHAGEAL IMPEDENCE FUNCTION TEST 1 HOUR OR LESS;  Surgeon: Halie Lang MD;  [facility]: [facility]     HEAD & NECK SURGERY  2/15/2017    C5-C6     HERNIA REPAIR  2006    Umbilical hernia     HERNIORRHAPHY HIATAL  6/22/2012    Procedure: HERNIORRHAPHY HIATAL;;  Surgeon: Francis Vyas MD;  [facility]     HERNIORRHAPHY INGUINAL  11/22/2013    Procedure: HERNIORRHAPHY INGUINAL;;  Surgeon: Francis Vyas MD;  [facility]     INSERT PORT VASCULAR ACCESS Right 12/19/2017    Procedure: INSERT PORT VASCULAR ACCESS;  Right Chest Port Placement ;  Surgeon: Lisandro Alejandro PA-C;  [facility]     INSERT PORT VASCULAR ACCESS Right 8/2/2018    Procedure: INSERT PORT VASCULAR ACCESS;  Place single lumen tunneled central venous access catheter;  Surgeon: Guy Jamil PA-C;  [facility]     IR CVC TUNNEL PLACEMENT > 5 YRS OF AGE  8/7/2019     IR CVC TUNNEL REMOVAL RIGHT  10/1/2019     IR FOLLOW UP VISIT OUTPATIENT  8/7/2019     IR PICC PLACEMENT > 5 YRS OF AGE  3/7/2019     LAPAROTOMY EXPLORATORY  11/22/2013    Procedure: LAPAROTOMY EXPLORATORY;  Exploratory Laparotomy, Upper Endoscopy, Left Inguinal Hernia Repair;  Surgeon: Francis Vyas MD;  [facility]: [facility]     ORTHOPEDIC SURGERY       PICC INSERTION Right 03/16/2017    5fr DL BioFlo PICC, 42cm (3cm external) in the R medial brachial vein w/ tip in the SVC RA junction.     PICC INSERTION Left 09/23/2017    5fr DL BioFlo PICC, 45cm (1cm external) in the L basilic vein w/ tip in the SVC RA junction.     PICC INSERTION Right 05/16/2019    5Fr - 43cm, Medial brachial vein, low SVC     PICC INSERTION Right  10/02/2019    5Fr - 43cm (2cm external), basilic vein, low SVC     SHAYLEE EN Y BOWEL  2003     SOFT TISSUE SURGERY       THORACIC SURGERY       TONSILLECTOMY       TRANSESOPHAGEAL ECHOCARDIOGRAM INTRAOPERATIVE N/A 2019    Procedure: TRANSESOPHAGEAL ECHOCARDIOGRAM INTRAOPERATIVE;  Surgeon: GENERIC ANESTHESIA PROVIDER;  Location: [facility]     FAMILY HISTORY  Family History   Problem Relation Age of Onset     Gastrointestinal Disease Mother         Crohns disease     Anxiety Disorder Mother      Thyroid Disease Mother         Grave's disease     Cancer Father         ear cancer-skin cancer/melanoma     Breast Cancer Maternal Grandmother      Macular Degeneration Maternal Grandfather      Anxiety Disorder Sister      Diabetes Maternal Uncle      Breast Cancer Other      Hypertension No family hx of      Hyperlipidemia No family hx of      Cerebrovascular Disease No family hx of      Prostate Cancer No family hx of      Depression No family hx of      Anesthesia Reaction No family hx of      Asthma No family hx of      Osteoporosis No family hx of      Genetic Disorder No family hx of      Obesity No family hx of      Mental Illness No family hx of      Substance Abuse No family hx of      Glaucoma No family hx of      SOCIAL HISTORY  Social History     Tobacco Use     Smoking status: Current Some Day Smoker     Packs/day: 0.25     Years: 3.00     Pack years: 0.75     Types: Cigarettes     Last attempt to quit: 2018     Years since quittin.2     Smokeless tobacco: Never Used     Tobacco comment: I use an e cig every now and than   Substance Use Topics     Alcohol use: No     Comment: quit      MEDICATIONS  Current Facility-Administered Medications   Medication     acetaminophen (TYLENOL) solution 500 mg     amphetamine-dextroamphetamine (ADDERALL) per tablet 20 mg     carvedilol (COREG) tablet 6.25 mg     dextrose 5% and 0.45% NaCl infusion     fentaNYL (DURAGESIC) 25 mcg/hr 72 hr patch 1 patch     fentaNYL  "(DURAGESIC) Patch in Place     fentaNYL (DURAGESIC) patch REMOVAL     heparin lock flush 10 UNIT/ML injection 5-10 mL     heparin lock flush 10 UNIT/ML injection 5-10 mL     lidocaine (LMX4) cream     lidocaine 1 % 0.1-1 mL     LORazepam (ATIVAN) tablet 1 mg     naloxone (NARCAN) injection 0.1-0.4 mg     ondansetron (ZOFRAN-ODT) ODT tab 4 mg    Or     ondansetron (ZOFRAN) injection 4 mg     oxyCODONE (ROXICODONE) solution 10 mg     pantoprazole (PROTONIX) EC tablet 40 mg     sodium chloride (PF) 0.9% PF flush 10-20 mL     sucralfate (CARAFATE) suspension 1 g     ALLERGIES  Allergies   Allergen Reactions     Bactrim [Sulfamethoxazole W/Trimethoprim] Rash     Penicillins Anaphylaxis     Please see Antimicrobial Management Team allergy assessment note 10/10/2018. Patient reported tolerating amoxicillin.     Doxycycline Rash     Vancomycin Rash     Rash after receiving vancomycin 3/28/16 (red man's?). Tolerated with slower infusion and diphenhydramine premed.       I have reviewed the Medications, Allergies, Past Medical and Surgical History, and Social History in the Epic system.    Review of Systems    Physical Exam   BP: 128/88  Pulse: 89  Heart Rate: 89  Temp: 98.1  F (36.7  C)  Resp: 18  Height: 180.3 cm (5' 11\")  Weight: 82.1 kg (181 lb)  SpO2: 96 %      Physical Exam  General: awake, alert, NAD  Head: normal cephalic  HEENT: pupils equal, conjugate gaze in tact  Neck: Supple  CV: regular rate and rhythm without murmur  Lungs: clear to ascultation  Abd: soft, non-tender, no guarding, no peritoneal signs.  G-tube site without any surrounding redness or erythema.   EXT: lower extremities without swelling or edema  Neuro: awake, answers questions appropriately. No focal deficits noted   Skin: PICC line without surrounding redness or erythema  Back: No midline focal tenderness    ED Course   5:51 PM  The patient was seen and examined by Oleg Barba MD, in Room ED30.     Medications   acetaminophen (TYLENOL) solution " 500 mg (has no administration in time range)   amphetamine-dextroamphetamine (ADDERALL) per tablet 20 mg (20 mg Oral Given 11/3/19 1107)   carvedilol (COREG) tablet 6.25 mg (6.25 mg Oral Given 11/3/19 0803)   fentaNYL (DURAGESIC) Patch in Place ( Transdermal Patch in Place 11/3/19 1654)   fentaNYL (DURAGESIC) 25 mcg/hr 72 hr patch 1 patch (has no administration in time range)   oxyCODONE (ROXICODONE) solution 10 mg (10 mg Oral Given 11/3/19 0803)   pantoprazole (PROTONIX) EC tablet 40 mg (40 mg Oral Given 11/3/19 0803)   dextrose 5% and 0.45% NaCl infusion ( Intravenous New Bag 11/3/19 0938)   lidocaine 1 % 0.1-1 mL (has no administration in time range)   lidocaine (LMX4) cream (has no administration in time range)   sodium chloride (PF) 0.9% PF flush 10-20 mL (has no administration in time range)   heparin lock flush 10 UNIT/ML injection 5-10 mL (5 mLs Intracatheter Not Given 11/3/19 0039)   heparin lock flush 10 UNIT/ML injection 5-10 mL (has no administration in time range)   naloxone (NARCAN) injection 0.1-0.4 mg (has no administration in time range)   ondansetron (ZOFRAN-ODT) ODT tab 4 mg ( Oral See Alternative 11/3/19 0818)     Or   ondansetron (ZOFRAN) injection 4 mg (4 mg Intravenous Given 11/3/19 0818)   LORazepam (ATIVAN) tablet 1 mg (1 mg Oral Given 11/3/19 0033)   sucralfate (CARAFATE) suspension 1 g (1 g Oral Given 11/3/19 0319)   fentaNYL (DURAGESIC) patch REMOVAL (has no administration in time range)       Results for orders placed or performed during the hospital encounter of 11/02/19 (from the past 24 hour(s))   CBC with platelets differential   Result Value Ref Range    WBC 10.4 4.0 - 11.0 10e9/L    RBC Count 4.43 4.4 - 5.9 10e12/L    Hemoglobin 13.3 13.3 - 17.7 g/dL    Hematocrit 42.4 40.0 - 53.0 %    MCV 96 78 - 100 fl    MCH 30.0 26.5 - 33.0 pg    MCHC 31.4 (L) 31.5 - 36.5 g/dL    RDW 15.2 (H) 10.0 - 15.0 %    Platelet Count 179 150 - 450 10e9/L    Diff Method Automated Method     % Neutrophils  59.9 %    % Lymphocytes 26.4 %    % Monocytes 11.5 %    % Eosinophils 1.2 %    % Basophils 0.6 %    % Immature Granulocytes 0.4 %    Nucleated RBCs 0 0 /100    Absolute Neutrophil 6.2 1.6 - 8.3 10e9/L    Absolute Lymphocytes 2.7 0.8 - 5.3 10e9/L    Absolute Monocytes 1.2 0.0 - 1.3 10e9/L    Absolute Eosinophils 0.1 0.0 - 0.7 10e9/L    Absolute Basophils 0.1 0.0 - 0.2 10e9/L    Abs Immature Granulocytes 0.0 0 - 0.4 10e9/L    Absolute Nucleated RBC 0.0    Lactic acid whole blood   Result Value Ref Range    Lactic Acid 1.5 0.7 - 2.0 mmol/L   Basic metabolic panel   Result Value Ref Range    Sodium 140 133 - 144 mmol/L    Potassium 3.9 3.4 - 5.3 mmol/L    Chloride 108 94 - 109 mmol/L    Carbon Dioxide 28 20 - 32 mmol/L    Anion Gap 4 3 - 14 mmol/L    Glucose 103 (H) 70 - 99 mg/dL    Urea Nitrogen 12 7 - 30 mg/dL    Creatinine 0.67 0.66 - 1.25 mg/dL    GFR Estimate >90 >60 mL/min/[1.73_m2]    GFR Estimate If Black >90 >60 mL/min/[1.73_m2]    Calcium 8.5 8.5 - 10.1 mg/dL   CRP inflammation   Result Value Ref Range    CRP Inflammation <2.9 0.0 - 8.0 mg/L   Blood culture   Result Value Ref Range    Specimen Description Blood Left Arm     Special Requests Aerobic and anaerobic bottles received     Culture Micro No growth after 17 hours    Blood culture   Result Value Ref Range    Specimen Description Blood gray port     Special Requests Aerobic and anaerobic bottles received     Culture Micro No growth after 17 hours    XR Chest 2 Views    Narrative    EXAM: XR CHEST 2 VW  11/2/2019 7:32 PM     HISTORY:  Cough, fever       COMPARISON:  10/2/2019    FINDINGS:   Right PICC tip projects at the cavoatrial junction.    The trachea is midline. The cardiomediastinal silhouette is well  defined. The pulmonary vasculature are distinct.     Mild wedge deformities of the lower thoracic vertebra, otherwise the  included osseous thorax, soft tissues and upper abdomen and are within  normal limits.     Lungs are clear without focal  airspace opacity, pleural effusion or  pneumothorax.      Impression    IMPRESSION: No acute airspace opacity.     I have personally reviewed the examination and initial interpretation  and I agree with the findings.    TIANNA FARMER MD   Influenza A and B and RSV PCR   Result Value Ref Range    Specimen Description Nasopharyngeal     Influenza A PCR Negative NEG^Negative    Influenza B PCR Negative NEG^Negative    Resp Syncytial Virus Negative NEG^Negative   UA with Microscopic reflex to Culture   Result Value Ref Range    Color Urine Yellow     Appearance Urine Clear     Glucose Urine Negative NEG^Negative mg/dL    Bilirubin Urine Negative NEG^Negative    Ketones Urine Negative NEG^Negative mg/dL    Specific Gravity Urine 1.022 1.003 - 1.035    Blood Urine Small (A) NEG^Negative    pH Urine 6.0 5.0 - 7.0 pH    Protein Albumin Urine Negative NEG^Negative mg/dL    Urobilinogen mg/dL 2.0 0.0 - 2.0 mg/dL    Nitrite Urine Negative NEG^Negative    Leukocyte Esterase Urine Negative NEG^Negative    Source Clean catch urine     WBC Urine <1 0 - 5 /HPF    RBC Urine 5 (H) 0 - 2 /HPF    Mucous Urine Present (A) NEG^Negative /LPF    Hyaline Casts 1 0 - 2 /LPF   Basic metabolic panel   Result Value Ref Range    Sodium 139 133 - 144 mmol/L    Potassium 3.9 3.4 - 5.3 mmol/L    Chloride 107 94 - 109 mmol/L    Carbon Dioxide 28 20 - 32 mmol/L    Anion Gap 5 3 - 14 mmol/L    Glucose 104 (H) 70 - 99 mg/dL    Urea Nitrogen 13 7 - 30 mg/dL    Creatinine 0.76 0.66 - 1.25 mg/dL    GFR Estimate >90 >60 mL/min/[1.73_m2]    GFR Estimate If Black >90 >60 mL/min/[1.73_m2]    Calcium 8.4 (L) 8.5 - 10.1 mg/dL   Phosphorus   Result Value Ref Range    Phosphorus 4.2 2.5 - 4.5 mg/dL   Magnesium   Result Value Ref Range    Magnesium 2.2 1.6 - 2.3 mg/dL     *Note: Due to a large number of results and/or encounters for the requested time period, some results have not been displayed. A complete set of results can be found in Results Review.           Procedures         Labs Ordered and Resulted from Time of ED Arrival Up to the Time of Departure from the ED   CBC WITH PLATELETS DIFFERENTIAL - Abnormal; Notable for the following components:       Result Value    MCHC 31.4 (*)     RDW 15.2 (*)     All other components within normal limits   BASIC METABOLIC PANEL - Abnormal; Notable for the following components:    Glucose 103 (*)     All other components within normal limits   LACTIC ACID WHOLE BLOOD   PERIPHERAL IV CATHETER            Assessments & Plan (with Medical Decision Making)   Parker is a 48-year-old male who presents with positive blood cultures from his PICC line; hx of frequent line infections.  Patient reports fever after infusions of the PICC line and then yesterday reports fever throughout the day.  Here he is nontoxic-appearing, afebrile and stable vital signs.  Differential would include bacteremia from a central line, would also consider other potential source of infection such as pneumonia.  His abdomen exam is relatively benign, including benign abdomen, and stable vital signs. Clear lungs, no obvious mid spinal tenderness or concern for seeding based on history and physical exam.  I reviewed both the patient's recent culture results which included a blood culture positive Corynebacterium and blood culture off the PICC line with the corresponding peripheral line with no growth to date x3 days now.  Basic labs are obtained. Patient has a normal white count, no left shift, normal lactic acid and normal electrolytes.  I did obtain a chest x-ray to rule out other source chest as patient reports cough but this shows no signs of pneumonia.  Discussed the case with ID they do think patient should be brought into the hospital, repeat blood cultures off both the central line and peripheral line were obtained.  ID requested no antibiotics at this time but if he spikes fever again will reculture and start vancomycin at that time.  Patient be admitted to  the medicine team.     No indication to pull the PICC line per ID team.    This part of the medical record was transcribed by John Smalls Medical Scribe, from a dictation done by Oleg Barba MD.       I have reviewed the nursing notes.    I have reviewed the findings, diagnosis, plan and need for follow up with the patient.    Current Discharge Medication List          Final diagnoses:   Fever and chills   I, John Smalls, am serving as a trained medical scribe to document services personally performed by Oleg Barba MD, based on the provider's statements to me.      I, Oleg Barba MD, was physically present and have reviewed and verified the accuracy of this note documented by John Smalls.      11/2/2019   [facility], [place], EMERGENCY DEPARTMENT     Oleg Barba MD  11/03/19 3618

## 2019-11-02 NOTE — TELEPHONE ENCOUNTER
Parker has prior RNY and SGS with malnutrition and a chronic PICC line for nutrition and has chronic issues with CLABSIs. He recently had a polymicrobial infection due to granulicatella, s epidermidis, and Strep salivarius. He completed vanco and ceftriaxone on 10/11. Surveillance blood cultures on 10/17 were negative.   He had repeat blood cultures drawn on 10/29 (peripheral) and 10/30 (PICC and peripheral). The 10/30 PICC cultures has GPR resembling diptheroids. I called and spoke with his wife. He has fever to 101-102 reported. Accompanying symptoms are sneezing. WBC and BMP were normal.    I instructed him to present to the ED for evaluation of his fever with indwelling central access. Diptheroids may be contaminants and his fever may be related to a cause other than bacteremia, but bacteremia should be ruled out. Of note I have counseled this patient in the past that emphasis on enteral nutrition would be optimal for him so he can remain line free but he is unfortunately not interested in pursuing this.    Chantale Mendez MD   of Medicine, Division of Infectious Diseases  [facility] [phone]

## 2019-11-02 NOTE — TELEPHONE ENCOUNTER
Cultures from the PICC Line came back positive. Are they going to keep him or send him home?  Running fevers, rash, same symptoms as before. Doesn't want to make 1.5 hour trip if they just send him back home. I told her it depends on the assessment results as to what next steps will be. It could go either way but I encouraged her to tell him that he should let them know if he wants to be admitted to the hospital and they'll take that into consideration. I advised he does need to go to where they advised him to go and start treatment otherwise he'll only get worse and that isn't a good thing to put off. She said alright and will encourage him to go in to be evaluated, as directed.  Crystal Quigley RN-[facility]

## 2019-11-03 LAB
ALBUMIN UR-MCNC: NEGATIVE MG/DL
ANION GAP SERPL CALCULATED.3IONS-SCNC: 5 MMOL/L (ref 3–14)
APPEARANCE UR: CLEAR
BILIRUB UR QL STRIP: NEGATIVE
BUN SERPL-MCNC: 13 MG/DL (ref 7–30)
CALCIUM SERPL-MCNC: 8.4 MG/DL (ref 8.5–10.1)
CHLORIDE SERPL-SCNC: 107 MMOL/L (ref 94–109)
CO2 SERPL-SCNC: 28 MMOL/L (ref 20–32)
COLOR UR AUTO: YELLOW
CREAT SERPL-MCNC: 0.76 MG/DL (ref 0.66–1.25)
FLUAV+FLUBV RNA SPEC QL NAA+PROBE: NEGATIVE
FLUAV+FLUBV RNA SPEC QL NAA+PROBE: NEGATIVE
GFR SERPL CREATININE-BSD FRML MDRD: >90 ML/MIN/{1.73_M2}
GLUCOSE SERPL-MCNC: 104 MG/DL (ref 70–99)
GLUCOSE UR STRIP-MCNC: NEGATIVE MG/DL
HGB UR QL STRIP: ABNORMAL
HYALINE CASTS #/AREA URNS LPF: 1 /LPF (ref 0–2)
KETONES UR STRIP-MCNC: NEGATIVE MG/DL
LEUKOCYTE ESTERASE UR QL STRIP: NEGATIVE
MAGNESIUM SERPL-MCNC: 2.2 MG/DL (ref 1.6–2.3)
MUCOUS THREADS #/AREA URNS LPF: PRESENT /LPF
NITRATE UR QL: NEGATIVE
PH UR STRIP: 6 PH (ref 5–7)
PHOSPHATE SERPL-MCNC: 4.2 MG/DL (ref 2.5–4.5)
POTASSIUM SERPL-SCNC: 3.9 MMOL/L (ref 3.4–5.3)
RBC #/AREA URNS AUTO: 5 /HPF (ref 0–2)
RSV RNA SPEC NAA+PROBE: NEGATIVE
SODIUM SERPL-SCNC: 139 MMOL/L (ref 133–144)
SOURCE: ABNORMAL
SP GR UR STRIP: 1.02 (ref 1–1.03)
SPECIMEN SOURCE: NORMAL
UROBILINOGEN UR STRIP-MCNC: 2 MG/DL (ref 0–2)
WBC #/AREA URNS AUTO: <1 /HPF (ref 0–5)

## 2019-11-03 PROCEDURE — 81001 URINALYSIS AUTO W/SCOPE: CPT | Performed by: PHYSICIAN ASSISTANT

## 2019-11-03 PROCEDURE — 25000132 ZZH RX MED GY IP 250 OP 250 PS 637: Performed by: PHYSICIAN ASSISTANT

## 2019-11-03 PROCEDURE — 96376 TX/PRO/DX INJ SAME DRUG ADON: CPT

## 2019-11-03 PROCEDURE — 84100 ASSAY OF PHOSPHORUS: CPT | Performed by: INTERNAL MEDICINE

## 2019-11-03 PROCEDURE — 80048 BASIC METABOLIC PNL TOTAL CA: CPT | Performed by: INTERNAL MEDICINE

## 2019-11-03 PROCEDURE — 96361 HYDRATE IV INFUSION ADD-ON: CPT

## 2019-11-03 PROCEDURE — 99232 SBSQ HOSP IP/OBS MODERATE 35: CPT | Performed by: INTERNAL MEDICINE

## 2019-11-03 PROCEDURE — 83735 ASSAY OF MAGNESIUM: CPT | Performed by: INTERNAL MEDICINE

## 2019-11-03 PROCEDURE — G0378 HOSPITAL OBSERVATION PER HR: HCPCS

## 2019-11-03 PROCEDURE — 25000128 H RX IP 250 OP 636: Performed by: PHYSICIAN ASSISTANT

## 2019-11-03 PROCEDURE — 25800025 ZZH RX 258: Performed by: PHYSICIAN ASSISTANT

## 2019-11-03 PROCEDURE — 36415 COLL VENOUS BLD VENIPUNCTURE: CPT | Performed by: INTERNAL MEDICINE

## 2019-11-03 PROCEDURE — 25000132 ZZH RX MED GY IP 250 OP 250 PS 637: Performed by: PEDIATRICS

## 2019-11-03 RX ORDER — SUCRALFATE ORAL 1 G/10ML
1 SUSPENSION ORAL 4 TIMES DAILY PRN
Status: DISCONTINUED | OUTPATIENT
Start: 2019-11-03 | End: 2019-11-04 | Stop reason: HOSPADM

## 2019-11-03 RX ORDER — LORAZEPAM 1 MG/1
1 TABLET ORAL
Status: DISCONTINUED | OUTPATIENT
Start: 2019-11-03 | End: 2019-11-04 | Stop reason: HOSPADM

## 2019-11-03 RX ADMIN — CARVEDILOL 6.25 MG: 6.25 TABLET, FILM COATED ORAL at 08:03

## 2019-11-03 RX ADMIN — DEXTROSE AND SODIUM CHLORIDE: 5; 450 INJECTION, SOLUTION INTRAVENOUS at 00:36

## 2019-11-03 RX ADMIN — CARVEDILOL 6.25 MG: 6.25 TABLET, FILM COATED ORAL at 18:01

## 2019-11-03 RX ADMIN — OXYCODONE HYDROCHLORIDE 10 MG: 5 SOLUTION ORAL at 08:03

## 2019-11-03 RX ADMIN — ONDANSETRON 4 MG: 2 INJECTION INTRAMUSCULAR; INTRAVENOUS at 08:18

## 2019-11-03 RX ADMIN — PANTOPRAZOLE SODIUM 40 MG: 40 TABLET, DELAYED RELEASE ORAL at 08:03

## 2019-11-03 RX ADMIN — OXYCODONE HYDROCHLORIDE 10 MG: 5 SOLUTION ORAL at 00:33

## 2019-11-03 RX ADMIN — SUCRALFATE 1 G: 1 SUSPENSION ORAL at 03:19

## 2019-11-03 RX ADMIN — OXYCODONE HYDROCHLORIDE 10 MG: 5 SOLUTION ORAL at 22:08

## 2019-11-03 RX ADMIN — FENTANYL 1 PATCH: 25 PATCH, EXTENDED RELEASE TRANSDERMAL at 22:08

## 2019-11-03 RX ADMIN — ONDANSETRON 4 MG: 2 INJECTION INTRAMUSCULAR; INTRAVENOUS at 22:08

## 2019-11-03 RX ADMIN — DEXTROAMPHETAMINE SACCHARATE, AMPHETAMINE ASPARTATE, DEXTROAMPHETAMINE SULFATE AND AMPHETAMINE SULFATE 20 MG: 2.5; 2.5; 2.5; 2.5 TABLET ORAL at 11:07

## 2019-11-03 RX ADMIN — LORAZEPAM 1 MG: 1 TABLET ORAL at 00:33

## 2019-11-03 RX ADMIN — LORAZEPAM 1 MG: 1 TABLET ORAL at 22:08

## 2019-11-03 RX ADMIN — DEXTROSE AND SODIUM CHLORIDE: 5; 450 INJECTION, SOLUTION INTRAVENOUS at 09:38

## 2019-11-03 RX ADMIN — DEXTROSE AND SODIUM CHLORIDE: 5; 450 INJECTION, SOLUTION INTRAVENOUS at 19:42

## 2019-11-03 RX ADMIN — SUCRALFATE 1 G: 1 SUSPENSION ORAL at 17:26

## 2019-11-03 ASSESSMENT — MIFFLIN-ST. JEOR: SCORE: 1779.84

## 2019-11-03 NOTE — H&P
[facility], [place]    Internal Medicine History and Physical - Gold Service       Date of Admission: 11/2/2019    Assessment & Plan  Parker Acevedo is a 48 year old man with a history of RnYGB and short gut syndrome w/ severe malnutrition requiring TPN c/b recurrent CLABSIs most recently admitted to [facility] 9/29-10/4/19 w/ granulicatella, staph epi, and strep salivarius CLABSI. Also w/ history of chronic pain, ADHD, tobacco abuse, and NICM. Now being registered to observation for further evaluation of positive outpatient surveillance blood culture.     #Positive Blood Culture. Patient completed vancomycin on 10/11/19 following admit for granulicatella, staph epi, and strep salivarius CLABSI. Has PICC for TPN. Surveillance blood cultures 10/17 were negative. Peripheral 10/29 negative however culture from PICC on 10/30 is + for corynebacterium species (peripheral again negative). Reports general malaise and intermittent fevers to 101F this week so suspect he could again have CLABSI vs. this could be contaminant. No other localizing complaints to explain fevers except some mild URI sx and CXR in ED is unrevealing. Currently is afebrile, no leukocytosis, and clinically appears well.   - Follow repeat blood cultures sent in ED.   - Add on CRP.   - Check rapid flu and UA/UC.   - Per ID instructions no antibiotics to be ordered at this time. Consult placed. If patient becomes febrile they recommend starting vancomycin; note that patient needs Benadryl pre med 30 min prior.  - Observation status to start but change to inpatient if deemed to be true CLABSI.     #Chronic Pain. At baseline.   - Continue home Fentanyl patch 25 mcg/hr Q48 hrs and oxycodone 10 mg which he reports taking up to TID.     #Chronic Severe Malnutrition.   #RnYGB.   #Short Gut Syndrome.   Managed on home TPN.   - Holding TPN tonight d/t lack of availability. Would touch base w/ ID tomorrow about continuing to use the line for  nutrition.   - Follow volume status. Uses 1L IVF bolus/day at home.     #History of NICM. Previously had mildly reduced LVEF to 45-50% but never any clinical e/o CHF. Had neg dobutamine stress 2/2017 and cards started carvedilol w/ plan to discuss ACE-I if tolerating but do not see that he followed up and as of 9/30/19 his LVEF had improved to 55-60%.   - Monitor.     #ADHD. Continue home Adderall.     Diet: regular but takes very little by mouth (some meds, sips of water, chews foods for pleasure); pharmacy/nutrition will need to discuss TPN tomorrow   Fluids: D5 1/2 NS @ 100 ml/hr   DVT Prophylaxis: mechanical/ambulatory   Code Status: full    Disposition Plan   Expected discharge: Tomorrow vs. transfer to IP status for further management if true bacteremia; recommended to prior living arrangement (home w/ wife and home infusion) once medically stable    Leana Rosales PA-C  Hospitalist Service  [facility]  Pager [phone]    Chief Complaint   Bacteremia     History is obtained from the patient and wife     History of Present Illness   Patient was instructed to come to ED by Dr. Mendez with ID on 11/1 for positive surveillance blood culture from PICC line which he has for chronic TPN after Cm was removed during 9/29-10/4/19 admit for CLABSI. This has been a recurrent issue for him. He has been off antibiotics for 3 weeks. He has been feeling worse than usual since Tuesday (10/29). Reports intermittent temps ranging 99s-100.3 and occasionally up to 101F when hooked up to infusion, as well as some night sweats. He has chronic nausea, abdominal pain, and loose stools which have not significantly changed. He reports intermittent headache this week as well as some sneezing, scratchy throat, and mild cough. Continues to smoke. No chest pain or SOB. A red bumpy rash was noticed on his right abdomen/flank yesterday but this has completed resolved. He has been sleeping a lot more than usual  which is typically a sign of infection for him. TPN runs 8p-10a nightly, and also receives additional 1L NS per day.     Review of Systems   10 point ROS performed and negative unless otherwise noted in HPI    Past Medical History    I have reviewed this patient's medical history and updated it with pertinent information if needed.   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     Complication of anesthesia      Difficulty swallowing      Gastric ulcer, unspecified as acute or chronic, without mention of hemorrhage, perforation, or obstruction      Gastro-oesophageal reflux disease      Head injury      Hiatal hernia      Other bladder disorder      Other chronic pain      PONV (postoperative nausea and vomiting)      Severe malnutrition (H)     TPN     Short gut syndrome      Tobacco abuse         Past Surgical History   I have reviewed this patient's surgical history and updated it with pertinent information if needed.  Past Surgical History:   Procedure Laterality Date     AMPUTATION       APPENDECTOMY       BACK SURGERY  11/3/2014    curve in the spine     BIOPSY LYMPH NODE CERVICAL N/A 2/20/2015    Procedure: BIOPSY LYMPH NODE CERVICAL;  Surgeon: Baron Scanlon MD;  Location: [facility] OR     C GASTRIC BYPASS,OBESE<100CM SHAYLEE-EN-Y  2002    lost 300 pounds     CHOLECYSTECTOMY       DISCECTOMY, FUSION CERVICAL ANTERIOR ONE LEVEL, COMBINED N/A 2/15/2017    Procedure: COMBINED DISCECTOMY, FUSION CERVICAL ANTERIOR ONE LEVEL;  Surgeon: Darren Campos MD;  Location: [facility]     ENDOSCOPIC INSERTION TUBE GASTROSTOMY  9/9/2013    Procedure: ENDOSCOPIC INSERTION TUBE GASTROSTOMY;;  Surgeon: Francis Vyas MD;  [facility]     ENDOSCOPIC ULTRASOUND  UPPER GASTROINTESTINAL TRACT (GI)  4/29/2011    Procedure:ENDOSCOPIC ULTRASOUND UPPER GASTROINTESTINAL TRACT (GI); Both Procedures done Conjointly; Surgeon:NEREIDA HOUSER; [facility]     ENDOSCOPIC ULTRASOUND UPPER GASTROINTESTINAL TRACT (GI)  9/9/2013    Procedure: ENDOSCOPIC ULTRASOUND UPPER GASTROINTESTINAL TRACT (GI);  Endoscopic Ultrasound Guide Gastrostomy Tube Placement  C-arm;  Surgeon: Noe Lizarraga MD;  [facility]     ENDOSCOPY  03/25/11    EGD, [facility]     ENDOSCOPY  08/04/09    Upper Endoscopy, [facility]     ENDOSCOPY  01/05/09    Upper Endoscopy, [facility]     ESOPHAGOSCOPY, GASTROSCOPY, DUODENOSCOPY (EGD), COMBINED  4/20/2011    Procedure:COMBINED ESOPHAGOSCOPY, GASTROSCOPY, DUODENOSCOPY (EGD); Surgeon:FRANCIS VYAS; [facility]     ESOPHAGOSCOPY, GASTROSCOPY, DUODENOSCOPY (EGD), COMBINED  6/15/2011    Procedure:COMBINED ESOPHAGOSCOPY, GASTROSCOPY, DUODENOSCOPY (EGD); Surgeon:FRANCIS VYAS; [facility]     ESOPHAGOSCOPY, GASTROSCOPY, DUODENOSCOPY (EGD), COMBINED  6/12/2013    Procedure: COMBINED ESOPHAGOSCOPY, GASTROSCOPY, DUODENOSCOPY (EGD);;  Surgeon: Francis Vyas MD;  [facility]     ESOPHAGOSCOPY, GASTROSCOPY, DUODENOSCOPY (EGD), COMBINED  11/22/2013    Procedure: COMBINED ESOPHAGOSCOPY, GASTROSCOPY, DUODENOSCOPY (EGD);;  Surgeon: Francis Vyas MD;  [facility]     ESOPHAGOSCOPY, GASTROSCOPY, DUODENOSCOPY (EGD), COMBINED  4/30/2014    Procedure: COMBINED ESOPHAGOSCOPY, GASTROSCOPY, DUODENOSCOPY (EGD);  Surgeon: Francis Vyas MD;  [facility]     ESOPHAGOSCOPY, GASTROSCOPY, DUODENOSCOPY (EGD), COMBINED N/A 2/20/2015    Procedure: COMBINED ESOPHAGOSCOPY, GASTROSCOPY, DUODENOSCOPY (EGD), BIOPSY SINGLE OR MULTIPLE;  Surgeon: Baron Scanlon MD;  Location:  OR     ESOPHAGOSCOPY, GASTROSCOPY, DUODENOSCOPY (EGD), COMBINED N/A 9/30/2015    Procedure: COMBINED ESOPHAGOSCOPY, GASTROSCOPY,  DUODENOSCOPY (EGD);  Surgeon: Francis Vyas MD;  Location:  GI     ESOPHAGOSCOPY, GASTROSCOPY, DUODENOSCOPY (EGD), COMBINED N/A 10/3/2019    Procedure: Upper Endoscopy;  Surgeon: Clif Morrow MD;  [facility]     GASTRECTOMY  6/22/2012    Procedure: GASTRECTOMY;  Open Approach, Excise Ulcers,Partial Gastrectomy, Esophagojejunostomy, Hiatal Hernia Repair, Extensive Lysis of Adhesions and Esaphagogastrodudenoscopy.;  Surgeon: Francis Vyas MD;  [facility]     GASTROJEJUNOSTOMY  08/26/09    Extensice enterolysis, partial resect. jejunum, part. resect gastric pouch, gastrojejunostomy anastomosis     HC ESOPH/GAS REFLUX TEST W NASAL IMPED ELECTRODE  8/5/2013    Procedure: ESOPHAGEAL IMPEDENCE FUNCTION TEST 1 HOUR OR LESS;  Surgeon: Halie Lang MD;  Location:  GI     HEAD & NECK SURGERY  2/15/2017    C5-C6     HERNIA REPAIR  2006    Umbilical hernia     HERNIORRHAPHY HIATAL  6/22/2012    Procedure: HERNIORRHAPHY HIATAL;;  Surgeon: Francis Vyas MD;  Location:  OR     HERNIORRHAPHY INGUINAL  11/22/2013    Procedure: HERNIORRHAPHY INGUINAL;;  Surgeon: Francis Vyas MD;  [facility]     INSERT PORT VASCULAR ACCESS Right 12/19/2017    Procedure: INSERT PORT VASCULAR ACCESS;  Right Chest Port Placement ;  Surgeon: Lisandro Alejandro PA-C;  [facility]     INSERT PORT VASCULAR ACCESS Right 8/2/2018    Procedure: INSERT PORT VASCULAR ACCESS;  Place single lumen tunneled central venous access catheter;  Surgeon: Guy Jamil PA-C;  [facility]     IR CVC TUNNEL PLACEMENT > 5 YRS OF AGE  8/7/2019     IR CVC TUNNEL REMOVAL RIGHT  10/1/2019     IR FOLLOW UP VISIT OUTPATIENT  8/7/2019     IR PICC PLACEMENT > 5 YRS OF AGE  3/7/2019     LAPAROTOMY EXPLORATORY  11/22/2013    Procedure: LAPAROTOMY EXPLORATORY;  Exploratory Laparotomy, Upper Endoscopy, Left Inguinal Hernia Repair;  Surgeon: Francis Vyas MD;  [facility]     ORTHOPEDIC  SURGERY       PICC INSERTION Right 03/16/2017    5fr DL BioFlo PICC, 42cm (3cm external) in the R medial brachial vein w/ tip in the SVC RA junction.     PICC INSERTION Left 09/23/2017    5fr DL BioFlo PICC, 45cm (1cm external) in the L basilic vein w/ tip in the SVC RA junction.     PICC INSERTION Right 05/16/2019    5Fr - 43cm, Medial brachial vein, low SVC     PICC INSERTION Right 10/02/2019    5Fr - 43cm (2cm external), basilic vein, low SVC     SHAYLEE EN Y BOWEL  2003     SOFT TISSUE SURGERY       THORACIC SURGERY       TONSILLECTOMY       TRANSESOPHAGEAL ECHOCARDIOGRAM INTRAOPERATIVE N/A 1/8/2019    Procedure: TRANSESOPHAGEAL ECHOCARDIOGRAM INTRAOPERATIVE;  Surgeon: GENERIC ANESTHESIA PROVIDER;  [facility]        Social History   Social History     Tobacco Use     Smoking status: Current Some Day Smoker - down to 6 cigarettes per day - does not feel ready to quit entirely      Smokeless tobacco: Never Used   Substance Use Topics     Alcohol use: No     Comment: quit 2002     Drug use: No       Family History   I have reviewed this patient's family history and updated it with pertinent information if needed.   Family History   Problem Relation Age of Onset     Gastrointestinal Disease Mother         Crohns disease     Anxiety Disorder Mother      Thyroid Disease Mother         Grave's disease     Cancer Father         ear cancer-skin cancer/melanoma     Breast Cancer Maternal Grandmother      Macular Degeneration Maternal Grandfather      Anxiety Disorder Sister      Diabetes Maternal Uncle      Breast Cancer Other        Prior to Admission Medications   Prior to Admission Medications   Prescriptions Last Dose Informant Patient Reported? Taking?   [facility] MANAGED PATIENT  Self No No   Sig: Contact [facility] for patient specific medication information at [phone] on admission and discharge from the hospital.  Phones are answered 24 hours a day 7 days a week 365 days a  "year.    Providers - Choose \"CONTINUE HOME MED (no script)\" at discharge if patient treatment with home infusion will continue.   LORazepam (ATIVAN) 1 MG tablet   No No   Si-2 mg as needed for anxiety with TPN and medications   Needle, Disp, (BD DISP NEEDLES) 27G X 1/2\" MISC   No No   Si Device every 30 days Use for cyanocobalamin injection once q 30 days.   UNABLE TO FIND  Self Yes No   Sig: States takes TPN 2050 ml  Every 14 hours through PICC   acetaminophen (TYLENOL) 32 mg/mL liquid  Self Yes No   Sig: Take 500 mg by mouth every 4 hours as needed for fever or mild pain   albuterol (PROAIR HFA/PROVENTIL HFA/VENTOLIN HFA) 108 (90 Base) MCG/ACT inhaler   No No   Sig: Inhale 2 puffs into the lungs every 4 hours as needed for shortness of breath / dyspnea or wheezing   amphetamine-dextroamphetamine (ADDERALL) 20 MG tablet   No No   Sig: Take 1 tablet (20 mg) by mouth daily   blood glucose (NO BRAND SPECIFIED) lancets standard  Self No No   Sig: Use to test blood sugar 1 times daily or as directed.   blood glucose (NO BRAND SPECIFIED) test strip  Self No No   Sig: Use to test blood sugar 1 times daily or as directed.   blood glucose monitoring (NO BRAND SPECIFIED) meter device kit  Self No No   Sig: Use to test blood sugar 1 times daily or as directed.   carvedilol (COREG) 6.25 MG tablet  Self Yes No   Sig: Take 6.25 mg by mouth 2 times daily (with meals)   cyanocobalamin (CYANOCOBALAMIN) 1000 MCG/ML injection  Self No No   Sig: Inject 1 mL (1,000 mcg) into the muscle every 30 days   diphenhydrAMINE (BENADRYL) 12.5 MG/5ML solution   No No   Sig: Take 10 mLs (25 mg) by mouth 2 times daily for 6 days   fentaNYL (DURAGESIC) 25 mcg/hr 72 hr patch   No No   Sig: Place 1 patch onto the skin every 48 hours remove old patch.   May fill 19 and start 19.   lidocaine (LIDODERM) 5 % Patch  Self No No   Sig: Place 1-2 patches onto the skin every 24 hours Wear for 12 hours, remove for 12 hours.  OK to cut to " "better fit to size.   lidocaine, alum & mag hydroxide-simethicone GI Cocktail   No No   Si ml daily as needed for mouth/stomach pain.   naloxone (NARCAN) 4 MG/0.1ML nasal spray  Self Yes No   Sig: Spray 4 mg into one nostril alternating nostrils once as needed every 2-3 minutes until assistance arrives   ondansetron (ZOFRAN-ODT) 8 MG ODT tab   No No   Sig: DISSOLVE ONE TABLET ON TONGUE EVERY 8 HOURS AS NEEDED FOR NAUSEA   order for DME  Self No No   Sig: Equipment being ordered: Bilateral knee high chronic venous insufficiency stockings--  mild-moderate pressures.   order for DME   No No   Sig: Equipment being ordered: Urine Drainage bag, leg.   oxyCODONE (ROXICODONE) 5 MG/5ML solution   No No   Sig: Take 10 mLs (10 mg) by mouth daily as needed for pain . 30 day supply. May fill on 10/14/19 to start on 10/15/19   pantoprazole (PROTONIX) 40 MG EC tablet   No No   Sig: Take 1 tablet (40 mg) by mouth daily   polyethylene glycol (MIRALAX/GLYCOLAX) powder  Self No No   Sig: Take 17 g (1 capful) by mouth daily   sodium chloride 0.9% infusion  Self Yes No   Sig: Inject 1,000-2,000 mLs into the vein as needed    sucralfate (CARAFATE) 1 GM tablet   No No   Sig: Take 1 tablet (1 g) by mouth 4 times daily   vitamin D2 (ERGOCALCIFEROL) 45383 units (1250 mcg) capsule  Self No No   Sig: Take 1 capsule (50,000 Units) by mouth once a week      Facility-Administered Medications: None     Allergies   Allergies   Allergen Reactions     Bactrim [Sulfamethoxazole W/Trimethoprim] Rash     Penicillins Anaphylaxis     Please see Antimicrobial Management Team allergy assessment note 10/10/2018. Patient reported tolerating amoxicillin.     Doxycycline Rash     Vancomycin Rash     Rash after receiving vancomycin 3/28/16 (red man's?). Tolerated with slower infusion and diphenhydramine premed.       Physical Exam   /87   Pulse 78   Temp 98.8  F (37.1  C) (Oral)   Resp 16   Ht 1.803 m (5' 11\")   Wt 82.1 kg (181 lb)   SpO2 98%   " BMI 25.24 kg/m    GENERAL: Alert and oriented x 3. Sitting up in bed, appears comfortable. Conversant. Wife at bedside.   HEENT: Anicteric sclera. Mucous membranes moist.   CV: RRR. S1, S2. No murmurs appreciated.   RESPIRATORY: Effort normal on room air. Lungs CTAB with no wheezing, rales, rhonchi.   GI: Abdomen soft and non distended, bowel sounds present. No tenderness, rebound, guarding. G tube site c/d/i.   NEUROLOGICAL: No focal deficits. Moves all extremities.   EXTREMITIES: No peripheral edema. Intact bilateral pedal pulses.   SKIN: No jaundice. No rashes.     Data   Data reviewed today: I reviewed all medications, new labs and imaging results over the last 24 hours.

## 2019-11-03 NOTE — ED NOTES
Called 7B who states receiving nurse tried calling ED for report but no one answered. Receiving nurse now on break and states will have her call when available.

## 2019-11-03 NOTE — PROVIDER NOTIFICATION
Lab called stating pt's blood cultures from 10/30 came back positive for gram cocci in clusters. Provider notified.

## 2019-11-03 NOTE — PROGRESS NOTES
"CLINICAL NUTRITION SERVICES - ASSESSMENT NOTE     Nutrition Prescription    RECOMMENDATIONS FOR MDs/PROVIDERS TO ORDER:  None at this time    Malnutrition Status:    Unable to determine at this time    Recommendations already ordered by Registered Dietitian (RD):  None at this time    Future/Additional Recommendations:  - Once PICC access appropriate to use, recommend resuming pt's home PN regimen as follows: CPN with goal vol of 1800 ml/day over 18 hours with 235 g Dex daily (799 kcal), 132 g AA daily (528 kcal), and 250 ml 20% IV lipids 3 days /wk (214 kcals).  Regimen provides a total of 1541 kcals (20 kcals/kg), 1.7 g PRO/kg, GIR 2.8 with 14 *% kcals from Fat. Dosing wt = 76.4 kg (per [facility]).   - Once PN therapy resumed, recommend switching IVF's of D5% to none dextrose solution.     REASON FOR ASSESSMENT  Parker Acevedo is a/an 48 year old male assessed by the dietitian for Pharmacy/Nutrition to Start and Manage PN - Continuation of home TPN    NUTRITION HISTORY  - Pt known to Nutrition Services from previous hospitalization (9/29 thru 10/4) d/t dependent on PN support secondary to h/o short gut syndrome. Pt is followed by [facility] for PN support.   Consumes oral intake, mostly for pleasure (has chronic nausea, abdominal pain, and loose stools which have not significantly changed).   - Per H&P, noted that Provider was unclear on admit last night if pt's PICC could be used for resuming pt's home PN d/t concern for infection.  - Attempted to visit with pt this am regarding resuming home PN therapy, but pt off the floor (outside smoking per RN).    CURRENT NUTRITION ORDERS  Diet: Regular  Intake/Tolerance: Unknown, first meal ordered this am    LABS  K+, Mg and PO4 WNL   on 10/30 (high), but within acceptable limits to receive lipids with PN infusion.    MEDICATIONS  IVF's of D5% at 100 ml/hr.    ANTHROPOMETRICS  Height: 180.3 cm (5' 11\")  Most Recent Weight: 82.1 kg (181 lb) - admit wt on 11/2  IBW: 78 " kg  BMI: Overweight BMI 25-29.9  Weight History: Unable to obtain from pt at this time. Per chart review, pt wt previous admit wt of 85.8 kg on 9/29. Current wt is down 3.7 kg (4.3% loss) x past 4 weeks.    Dosing Weight: 76.4 kg (per [facility] for PN formula)    ASSESSED NUTRITION NEEDS  Estimated Energy Needs: 1910- 2292 kcals/day (25 - 30 kcals/kg)  Justification: Maintenance  Estimated Protein Needs:  grams protein/day (1.2 - 1.8 grams of pro/kg)  Justification: Hypercatabolism with acute illness  Estimated Fluid Needs: (1 mL/kcal)   Justification: Maintenance    PHYSICAL FINDINGS  See malnutrition section below.  Unable to assess at this time d/t pt unavailable to see     MALNUTRITION  % Intake: Unable to assess  % Weight Loss: > 5% in 1 month (severe)  Subcutaneous Fat Loss: Unable to assess  Muscle Loss: Unable to assess  Fluid Accumulation/Edema: None noted per chart review  Malnutrition Diagnosis: Unable to determine due to unable to obtain diet hx and unable to complete nutritional status validation.    NUTRITION DIAGNOSIS  Unintended weight loss related to question inadequate nutritional intakes d/t h/o short gut syndrome inhibiting po and dependent on PN therapy, but intakes unknown and currently on hold d/t concern for line infection as evidenced by down 3.7 kg (4.3% loss) x past 4 weeks and no PN therapy received at this time.    INTERVENTIONS  Implementation  Nutrition Education: No education needs assessed at this time   Collaboration with Provider regarding starting PN therapy tonight. Informed that PN on hold until tomorrow pending results of cultures ( x 48 hrs).     Goals  1. Resumption of nutrition support within 24 to 48 hrs  2. Total avg nutritional intake to meet a minimum of 25 kcal/kg and 1.2 g PRO/kg daily (per dosing wt 82 kg).     Monitoring/Evaluation  Progress toward goals will be monitored and evaluated per protocol.  Araseli Eden RD,LD  Weekend pager [phone]

## 2019-11-03 NOTE — ED NOTES
[facility], [place]   ED Nurse to Floor Handoff     Parker Acevedo is a 48 year old male who speaks English and lives with a spouse,  in a home  They arrived in the ED by car from home    ED Chief Complaint: Abnormal Labs (positive blood cx)    ED Dx;   Final diagnoses:   Fever and chills         Needed?: No    Allergies:   Allergies   Allergen Reactions     Bactrim [Sulfamethoxazole W/Trimethoprim] Rash     Penicillins Anaphylaxis     Please see Antimicrobial Management Team allergy assessment note 10/10/2018. Patient reported tolerating amoxicillin.     Doxycycline Rash     Vancomycin Rash     Rash after receiving vancomycin 3/28/16 (red man's?). Tolerated with slower infusion and diphenhydramine premed.   .  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  Sepsis suspected: No  Isolation type: No active isolations     Activity level - Baseline/Home:  Independent  Activity Level - Current:   Independent    Bariatric equipment needed?: No    In the ED these meds were given:   Medications   oxyCODONE (ROXICODONE)  solution 5 mg (5 mg Oral Given 11/2/19 1958)   ondansetron (ZOFRAN) injection 4 mg (4 mg Intravenous Given 11/2/19 1958)       Drips running?  No    Home pump  No    Current LDAs  Peripheral IV 11/02/19 Left Lower forearm (Active)   Site Assessment WDL 11/2/2019  7:00 PM   Number of days: 0       PICC Double Lumen 10/02/19 Right Basilic (Active)   Number of days: 31       Gastrostomy/Enterostomy Gastrostomy LUQ 1 18 fr (Active)   Number of days: 2676       Wound 06/05/18 Left;Posterior;Mid;Inner;Lower Arm Ulceration Bruised w/ an unopened sore (Active)   Number of days: 515       Incision/Surgical Site 10/03/19 Mouth (Active)   Number of days: 30       Labs results:   Labs Ordered and Resulted from Time of ED Arrival Up to the Time of Departure from the ED   CBC WITH PLATELETS DIFFERENTIAL - Abnormal; Notable for the following components:       Result Value    MCHC 31.4 (*)     RDW 15.2 (*)     All other components within normal limits   BASIC METABOLIC PANEL - Abnormal; Notable for the following components:    Glucose 103 (*)     All other components within normal limits   LACTIC ACID WHOLE BLOOD   PERIPHERAL IV CATHETER   BLOOD CULTURE   BLOOD CULTURE       Imaging Studies:   Recent Results (from the past 24 hour(s))   XR Chest 2 Views    Narrative    EXAM: XR CHEST 2 VW  11/2/2019 7:32 PM     HISTORY:  Cough, fever       COMPARISON:  10/2/2019    FINDINGS:   Right PICC tip projects at the cavoatrial junction.    The trachea is midline. The cardiomediastinal silhouette is well  defined. The pulmonary vasculature are distinct.     Mild wedge deformities of the lower thoracic vertebra, otherwise the  included osseous thorax, soft tissues and upper abdomen and are within  normal limits.     Lungs are clear without focal airspace opacity, pleural effusion or  pneumothorax.      Impression    IMPRESSION: No acute airspace opacity.     I have personally reviewed the examination and initial interpretation  and I agree  "with the findings.    TIANNA FARMER MD       Recent vital signs:   /87   Pulse 78   Temp 98.8  F (37.1  C) (Oral)   Resp 16   Ht 1.803 m (5' 11\")   Wt 82.1 kg (181 lb)   SpO2 98%   BMI 25.24 kg/m      [place] Coma Scale Score: 15 (11/02/19 2002)       Cardiac Rhythm: Normal Sinus  Pt needs tele? No  Skin/wound Issues: None    Code Status: Full Code    Pain control: good    Nausea control: good    Abnormal labs/tests/findings requiring intervention: positive blood cultures    Family present during ED course? Yes   Family Comments/Social Situation comments: n/a    Tasks needing completion: None    Sayda Melendez, RN    [phone] [facility]      "

## 2019-11-03 NOTE — PLAN OF CARE
"Vital signs:  Temp: 97.5  F (36.4  C) Temp src: Oral BP: 132/89 Pulse: 72 Heart Rate: 75 Resp: 16 SpO2: 99 % O2 Device: None (Room air)  Pt arrived to unit from ED around 2330.   Activity: Up independently.   Neuros: A&O x4.   Cardiac: WDL.   Respiratory: WDL on RA. Denies SOB.   GI/: Voided x1, UA sent. +BS, pt states normal for him to have BM every 4-6 weeks because \"I don't eat a lot\"  Diet: Regular.   Lines: Right PICC. Left PIV infusing D5 1/2NS @100mL/hr.   Incisions/Drains: G to gravity.   Pain/nausea: Oxy given x1. Ativan given x1. Carafate given x1.  Plan: Monitor for fevers. ID consult.  "

## 2019-11-03 NOTE — PLAN OF CARE
Tmax 99.4. G to gravity, pt independent in cares. Up ad vitor. 2xR PICC - not cleared to use @ this time - MIVF 100/hr in L PIV. Awaiting ID consult, plan for TPN infusion. Proceed w/ POC    Observation goals:  - ID consult completed: Not met.  - Negative blood cultures from ED: Pending, 10/30 cultures positive

## 2019-11-03 NOTE — CONSULTS
General Infectious Disease Service Consultation     Patient:  Parker Acevedo   Date of birth 1970, Medical record number [id number]  Date of Visit:  11/03/2019  Date of Admission: 11/2/2019  Consult Requester:Francis Torres MD            Assessment and Recommendations:   ASSESSMENT:  48 year old male with PMH of Celestino-en-Y gastric bypass complicated by marginal ulceration, requiring multiple revisions subsequently requring lap corey, gastrojejunostomy, gastrectomy, appendectomy, chronic abdominal pain on opioids, PUD, severe malnutrition, short gut syndrome on chronic TPN with history of recurrent bacteremia.  Recently hospitalized 09/29-10/04/19 at [facility] with  Granulicatella, Staph epi, and Strep salivarius CLABSI.  Currently admitted for observation for positive surveillance  blood cultures from PICC.    #  Positive blood culture in a patient with a pre-existing central line used for TPN due to short gut syndrome and severe malnutrition.  Patient with h/o frequent CLABSIs (last Cm catheter removed during previous hospital stay 9/29-10/4). Recently finished Vancomycin on 10/11/19.   Repeat surveillance Cx from peripheral 10/17 and 10/29 negative, but surveillance Cx from PICC with Corynebacterium and GPCs in pairs on 10/30/19.   Likely contamination, given that peripheral Cx remain negative (10/29  and 11/02).  Patient is vitally stable, has no leukocytosis. PICC looks clean.     # S/p gastric bypass with multiple revisions, short gut syndrome, chronic malnutrition, on chronic TPN.  PICC in [facility] placed 4 weeks ago.    RECOMMENDATION:  - No need for antibiotics at this time.  - F/u repeat blood Cx results  - May use PICC  - If patient becomes febrile, please obtain blood Cx, start Vancomycin  and call ID if repeat blood cx positive.  - Call ID if blood cx with Staph. Aureus.    ID will sign off.    Roma Jesus MD, ID fellow  Pager  [phone]      __________________________________________________________    Consult Question:.  Admission Diagnosis: Fever and chills [R50.9]         History of Present Illness:   Patient is a 48 year old male with PMH of Celestino-en-Y gastric bypass complicated by marginal ulceration, requiring multiple revisions subsequently requring lap corey, gastrojejunostomy, gastrectomy, appendectomy, chronic abdominal pain on opioids, PUD, severe malnutrition, short gut syndrome, on chronic TPN now through PICC, with history of recurrent bacteremia (CLABSI).  Recently hospitalized 09/29-10/04/19 at [facility] with  Granulicatella, Staph epi, and Strep salivarius bacteremia. Cm removed last admission.   He  was instructed to come to ED by Dr. Mendez with ID on 11/01 for positive surveillance blood culture from PICC line which he has for chronic TPN after Cm was removed during 09/29-10/04/19 admit for CLABSI. This has been a recurrent issue for him.   He has been off antibiotics for 3 weeks. He has been feeling worse than usual since Tuesday (10/29). Reports intermittent temps ranging 99s-100.3 and occasionally up to 101F when hooked up to infusion, as well as some night sweats. He has chronic nausea, abdominal pain, and loose stools which have not significantly changed. He reports intermittent headache this week as well as some sneezing, scratchy throat, and mild cough. Continues to smoke. No chest pain or SOB. A red bumpy rash was noticed on his right abdomen/flank yesterday but this has completed resolved. He has been sleeping a lot more than usual which is typically a sign of infection for him. TPN runs 8p-10a nightly, and also receives additional 1L NS per day.   On admission 11/02/19 he had negative UA, Flu A&B, RSV -all negative, CRP <2.9 ( an drepeatedly was <2.9 in October), WBC -10.4, LA-1.8.  CXR obtained was negative.  Today patient denies fever, chills, cough, abdominal pain, myalgias, headache, CP, SOB.  He  c/o mild nausea and 2 episodes of vomiting early this mornig.  Vital signs are stable, and he has no leukocytosis.  Repeat blood cx obtained preliminary negative 11/02.         Recent Inflammatory Markers    Recent Labs   Lab Test 11/02/19  1853 10/30/19  1330 10/29/19  1210 10/22/19  1120 10/17/19  1340 10/07/19  1952 10/04/19  0455 10/03/19  0550  07/10/19  1215 06/28/19  1345  05/14/19  0620  03/05/19  1535   CRP <2.9 <2.9 <2.9  --   --   --   --   --   --  <2.9 <2.9  --  5.7  --  <2.9   WBC 10.4 9.5  --  Canceled, Test credited 6.7 9.2 7.2 7.1   < > Canceled, Test credited 11.0   < >  --    < > 7.7    < > = values in this interval not displayed.       Recent culture results include:  Culture Micro   Date Value Ref Range Status   11/02/2019 No growth after 17 hours  Preliminary   11/02/2019 No growth after 17 hours  Preliminary   10/30/2019 No growth after 4 days  Preliminary   10/30/2019 (A)  Preliminary    Cultured on the 3rd day of incubation:  Corynebacterium species  Identification obtained by MALDI-TOF mass spectrometry research use only database. Test   characteristics determined and verified by the [facility] Laboratory   ([facility]) [place], [place].     10/30/2019   Preliminary    Critical Value/Significant Value, preliminary result only, called to and read back by   DR VEGA @ 1745. 11/1/2019 AV     10/30/2019 (A)  Preliminary    Cultured on the 4th day of incubation:  Gram positive cocci in clusters     10/30/2019   Preliminary    Critical Value/Significant Value, preliminary result only, called to and read back by  DANI SANCHES RN 0200 11.3.19 ND     10/30/2019   Preliminary    (Note)  NEGATIVE for the following: Staphylococcus spp., Staph aureus, Staph  epidermidis, Staph lugdunensis, Streptococcus spp., Strep pneumoniae,  Strep pyogenes, Strep agalactiae, Strep anginosus group, Enterococcus  faecalis, Enterococcus faecium, and Listeria spp. by [facility]  multiplex nucleic acid test.  Final identification and antimicrobial  susceptibility testing will be verified by standard methods.    Critical Value/Significant Value called to and read back by  Francis Vyas MD @ 1745. 11/1/19. AV.           NEGATIVE for the following: Staphylococcus spp., Staph aureus, Staph  epidermidis, Staph lugdunensis, Streptococcus spp., Strep pneumoniae,  Strep pyogenes, Strep agalactiae, Strep anginosus group, Enterococcus  faecalis, Enterococcus faecium, and Listeria spp. by [facility]  multiplex nucleic acid test. Final identification and antimicrobial  susceptibility testing will be verified by standard methods.    Critical Value/Significant Value called to and read back by DANI SANCHES, RN 0503 11.3.19 NDP     10/29/2019 No growth after 5 days  Preliminary   10/17/2019 No growth  Final   10/17/2019 No growth  Final                Review of Systems:   12-point ROS is negative except as in HPI.         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     Complication of anesthesia      Difficulty swallowing      Gastric ulcer, unspecified as acute or chronic, without mention of hemorrhage, perforation, or obstruction      Gastro-oesophageal reflux disease      Head injury      Hiatal hernia      Other bladder disorder      Other chronic pain      PONV (postoperative nausea and vomiting)      Severe malnutrition (H)     TPN     Short gut syndrome      Tobacco abuse             Past Surgical History:     Past Surgical History:   Procedure Laterality Date     AMPUTATION       APPENDECTOMY       BACK SURGERY  11/3/2014    curve in the spine     BIOPSY LYMPH NODE CERVICAL N/A 2/20/2015    Procedure: BIOPSY LYMPH NODE CERVICAL;  Surgeon:  Baron Scanlon MD;  Location: [facility] OR     C GASTRIC BYPASS,OBESE<100CM SHAYLEE-EN-Y  2002    lost 300 pounds     CHOLECYSTECTOMY       DISCECTOMY, FUSION CERVICAL ANTERIOR ONE LEVEL, COMBINED N/A 2/15/2017    Procedure: COMBINED DISCECTOMY, FUSION CERVICAL ANTERIOR ONE LEVEL;  Surgeon: Darren Cmapos MD;  Location: [facility]     ENDOSCOPIC INSERTION TUBE GASTROSTOMY  9/9/2013    Procedure: ENDOSCOPIC INSERTION TUBE GASTROSTOMY;;  Surgeon: Francis Vyas MD;  [facility]     ENDOSCOPIC ULTRASOUND UPPER GASTROINTESTINAL TRACT (GI)  4/29/2011    Procedure:ENDOSCOPIC ULTRASOUND UPPER GASTROINTESTINAL TRACT (GI); Both Procedures done Conjointly; Surgeon:NEREIDA HOUSER; [facility]     ENDOSCOPIC ULTRASOUND UPPER GASTROINTESTINAL TRACT (GI)  9/9/2013    Procedure: ENDOSCOPIC ULTRASOUND UPPER GASTROINTESTINAL TRACT (GI);  Endoscopic Ultrasound Guide Gastrostomy Tube Placement  C-arm;  Surgeon: Noe Lizarraga MD;  [facility]     ENDOSCOPY  03/25/11    EGD, [facility]     ENDOSCOPY  08/04/09    Upper Endoscopy, [facility]     ENDOSCOPY  01/05/09    Upper Endoscopy, [facility]     ESOPHAGOSCOPY, GASTROSCOPY, DUODENOSCOPY (EGD), COMBINED  4/20/2011    Procedure:COMBINED ESOPHAGOSCOPY, GASTROSCOPY, DUODENOSCOPY (EGD); Surgeon:FRANCIS VYAS; [facility]     ESOPHAGOSCOPY, GASTROSCOPY, DUODENOSCOPY (EGD), COMBINED  6/15/2011    Procedure:COMBINED ESOPHAGOSCOPY, GASTROSCOPY, DUODENOSCOPY (EGD); Surgeon:FRANCIS VYAS; [facility]     ESOPHAGOSCOPY, GASTROSCOPY, DUODENOSCOPY (EGD), COMBINED  6/12/2013    Procedure: COMBINED ESOPHAGOSCOPY, GASTROSCOPY, DUODENOSCOPY (EGD);;  Surgeon: Francis Vyas MD;  [facility]     ESOPHAGOSCOPY, GASTROSCOPY, DUODENOSCOPY (EGD), COMBINED  11/22/2013    Procedure: COMBINED ESOPHAGOSCOPY, GASTROSCOPY, DUODENOSCOPY (EGD);;  Surgeon: Francis Vyas MD;  [facility]     ESOPHAGOSCOPY, GASTROSCOPY, DUODENOSCOPY  (EGD), COMBINED  4/30/2014    Procedure: COMBINED ESOPHAGOSCOPY, GASTROSCOPY, DUODENOSCOPY (EGD);  Surgeon: Francis Vyas MD;  Location:  GI     ESOPHAGOSCOPY, GASTROSCOPY, DUODENOSCOPY (EGD), COMBINED N/A 2/20/2015    Procedure: COMBINED ESOPHAGOSCOPY, GASTROSCOPY, DUODENOSCOPY (EGD), BIOPSY SINGLE OR MULTIPLE;  Surgeon: Baron Scanlon MD;  Location[facility]     ESOPHAGOSCOPY, GASTROSCOPY, DUODENOSCOPY (EGD), COMBINED N/A 9/30/2015    Procedure: COMBINED ESOPHAGOSCOPY, GASTROSCOPY, DUODENOSCOPY (EGD);  Surgeon: Francis Vyas MD;  Location:  GI     ESOPHAGOSCOPY, GASTROSCOPY, DUODENOSCOPY (EGD), COMBINED N/A 10/3/2019    Procedure: Upper Endoscopy;  Surgeon: Clif Morrow MD;  [facility]     GASTRECTOMY  6/22/2012    Procedure: GASTRECTOMY;  Open Approach, Excise Ulcers,Partial Gastrectomy, Esophagojejunostomy, Hiatal Hernia Repair, Extensive Lysis of Adhesions and Esaphagogastrodudenoscopy.;  Surgeon: Francis Vyas MD;  [facility]     GASTROJEJUNOSTOMY  08/26/09    Extensice enterolysis, partial resect. jejunum, part. resect gastric pouch, gastrojejunostomy anastomosis     HC ESOPH/GAS REFLUX TEST W NASAL IMPED ELECTRODE  8/5/2013    Procedure: ESOPHAGEAL IMPEDENCE FUNCTION TEST 1 HOUR OR LESS;  Surgeon: Halie Lang MD;  Location:  GI     HEAD & NECK SURGERY  2/15/2017    C5-C6     HERNIA REPAIR  2006    Umbilical hernia     HERNIORRHAPHY HIATAL  6/22/2012    Procedure: HERNIORRHAPHY HIATAL;;  Surgeon: Francis Vyas MD;  [facility]     HERNIORRHAPHY INGUINAL  11/22/2013    Procedure: HERNIORRHAPHY INGUINAL;;  Surgeon: Francis Vyas MD;  [facility]     INSERT PORT VASCULAR ACCESS Right 12/19/2017    Procedure: INSERT PORT VASCULAR ACCESS;  Right Chest Port Placement ;  Surgeon: Lisandro Alejandro PA-C;  [facility]     INSERT PORT VASCULAR ACCESS Right 8/2/2018    Procedure: INSERT PORT VASCULAR ACCESS;  Place single lumen  tunneled central venous access catheter;  Surgeon: Guy Jamil PA-C;  [facility]     IR CVC TUNNEL PLACEMENT > 5 YRS OF AGE  8/7/2019     IR CVC TUNNEL REMOVAL RIGHT  10/1/2019     IR FOLLOW UP VISIT OUTPATIENT  8/7/2019     IR PICC PLACEMENT > 5 YRS OF AGE  3/7/2019     LAPAROTOMY EXPLORATORY  11/22/2013    Procedure: LAPAROTOMY EXPLORATORY;  Exploratory Laparotomy, Upper Endoscopy, Left Inguinal Hernia Repair;  Surgeon: Francis Vyas MD;  [facility]     ORTHOPEDIC SURGERY       PICC INSERTION Right 03/16/2017    5fr DL BioFlo PICC, 42cm (3cm external) in the R medial brachial vein w/ tip in the SVC RA junction.     PICC INSERTION Left 09/23/2017    5fr DL BioFlo PICC, 45cm (1cm external) in the L basilic vein w/ tip in the SVC RA junction.     PICC INSERTION Right 05/16/2019    5Fr - 43cm, Medial brachial vein, low SVC     PICC INSERTION Right 10/02/2019    5Fr - 43cm (2cm external), basilic vein, low SVC     SHAYLEE EN Y BOWEL  2003     SOFT TISSUE SURGERY       THORACIC SURGERY       TONSILLECTOMY       TRANSESOPHAGEAL ECHOCARDIOGRAM INTRAOPERATIVE N/A 1/8/2019    Procedure: TRANSESOPHAGEAL ECHOCARDIOGRAM INTRAOPERATIVE;  Surgeon: GENERIC ANESTHESIA PROVIDER;  [facility]            Family History:     Family History   Problem Relation Age of Onset     Gastrointestinal Disease Mother         Crohns disease     Anxiety Disorder Mother      Thyroid Disease Mother         Grave's disease     Cancer Father         ear cancer-skin cancer/melanoma     Breast Cancer Maternal Grandmother      Macular Degeneration Maternal Grandfather      Anxiety Disorder Sister      Diabetes Maternal Uncle      Breast Cancer Other      Hypertension No family hx of      Hyperlipidemia No family hx of      Cerebrovascular Disease No family hx of      Prostate Cancer No family hx of      Depression No family hx of      Anesthesia Reaction No family hx of      Asthma No family hx of      Osteoporosis No  family hx of      Genetic Disorder No family hx of      Obesity No family hx of      Mental Illness No family hx of      Substance Abuse No family hx of      Glaucoma No family hx of      IMMUNE:  No history of immunodeficiency within the family.         Social History:     Social History     Tobacco Use     Smoking status: Current Some Day Smoker     Packs/day: 0.25     Years: 3.00     Pack years: 0.75     Types: Cigarettes     Last attempt to quit: 2018     Years since quittin.2     Smokeless tobacco: Never Used     Tobacco comment: I use an e cig every now and than   Substance Use Topics     Alcohol use: No     Comment: quit 2002     History   Sexual Activity     Sexual activity: Yes     Partners: Female     Birth control/ protection: None     Comment: no protection       No results found for: HIV         Current Medications (antimicrobials listed in bold):       amphetamine-dextroamphetamine  20 mg Oral Daily     carvedilol  6.25 mg Oral BID w/meals     fentaNYL  25 mcg Transdermal Q48H     fentaNYL   Transdermal Q8H     fentaNYL   Transdermal Q48H     heparin lock flush  5-10 mL Intracatheter Q24H     pantoprazole  40 mg Oral Daily            Allergies:     Allergies   Allergen Reactions     Bactrim [Sulfamethoxazole W/Trimethoprim] Rash     Penicillins Anaphylaxis     Please see Antimicrobial Management Team allergy assessment note 10/10/2018. Patient reported tolerating amoxicillin.     Doxycycline Rash     Vancomycin Rash     Rash after receiving vancomycin 3/28/16 (red man's?). Tolerated with slower infusion and diphenhydramine premed.            Physical Exam:   Vitals were reviewed  For the past 24 hours, the ranges for their vital signs:  Temp:  [97.5  F (36.4  C)-99.4  F (37.4  C)] 99.3  F (37.4  C)  Pulse:  [72-82] 82  Heart Rate:  [75-80] 80  Resp:  [16] 16  BP: (119-141)/(80-89) 141/88  SpO2:  [98 %-99 %] 98 %    Physical Examination:  GENERAL:  well-developed, well-nourished, in bed in no  acute distress.   HEENT:  Head is normocephalic, atraumatic   EYES:  Eyes have anicteric sclerae without conjunctival injection or stigmata of endocarditis.    ENT:  Oropharynx is moist without exudates or ulcers. Tongue is midline  NECK:  Supple. No  Cervical lymphadenopathy  LUNGS:  Clear to auscultation bilateral.   CARDIOVASCULAR:  Regular rate and rhythm with no murmurs, gallops or rubs.  ABDOMEN:  Normal bowel sounds, soft, nontender. No appreciable hepatosplenomegaly. G-tube in place clean, no surrounding erythema, no discharge.   SKIN:  No acute rashes.  R PICC in place without any surrounding erythema or exudate. No stigmata of endocarditis.  NEUROLOGIC:  Grossly nonfocal. Active x4 extremities         Laboratory Data:         Hematology Studies    Recent Labs   Lab Test 11/02/19  1853 10/30/19  1330 10/22/19  1120 10/17/19  1340 10/07/19  1952 10/04/19  0455  09/29/19  1559 09/10/19  1215  07/23/19  0607 07/22/19  1820   WBC 10.4 9.5 Canceled, Test credited 6.7 9.2 7.2   < > 8.2 7.2   < > 6.8 7.8   ANEU 6.2 6.6  --  4.1 5.1  --   --  5.1 4.7   < > 3.6 4.4   AEOS 0.1 0.1  --  0.2  --   --   --  0.2  --   --  0.1 0.1   HGB 13.3 13.3 Canceled, Test credited 11.6* 12.9* 12.1*   < > 13.8 12.5*   < > 13.0* 13.5   MCV 96 95 Canceled, Test credited 96 92 95   < > 95 94   < > 99 96    185 Canceled, Test credited 173 179 163   < > 169 180   < > 180 193    < > = values in this interval not displayed.       Immune Globulin Studies  No lab results found.    Metabolic Studies     Recent Labs   Lab Test 11/03/19  0808 11/02/19  1853 10/30/19  1330 10/29/19  1210 10/22/19  1120    140 142 142 145*   POTASSIUM 3.9 3.9 3.5 3.7 3.7   CHLORIDE 107 108 109 109 113*   CO2 28 28 23 28 28   BUN 13 12 13 9 12   CR 0.76 0.67 0.88 0.67 0.65*   GFRESTIMATED >90 >90 >90 >90 >90       Hepatic Studies    Recent Labs   Lab Test 10/30/19  1330 10/29/19  1210 10/22/19  1120 10/17/19  1340 10/07/19  1952 10/01/19  0819  09/29/19  1559   BILITOTAL 0.4 0.3 0.3 0.4 0.2 0.4 0.4   ALKPHOS 95 92 79 76  --  87 96   ALBUMIN 3.2* 3.4 3.2* 3.3*  --  3.4 3.6   AST 11 11 15 14  --  14 8   ALT 17 20 21 16  --  18 18       Thyroid Studies    Recent Labs   Lab Test 05/12/19  2111 11/03/14  1355   TSH 1.16 0.96       Microbiology:    Blood Cx  10/17 peripheral- negative  10/29 peripheral- negative  10/30 PICC -Corynebacterium and GPCs in clusters.  11/02 peripheral- negative      Urine Studies    Recent Labs   Lab Test 11/03/19  0025 10/04/19  1344 09/29/19  1625 05/24/19  2316 05/12/19  2111   LEUKEST Negative Trace* Negative Negative Negative   WBCU <1 1 0 1 1       Vancomycin Levels    Recent Labs   Lab Test 10/07/19  1952 10/04/19  0455 10/01/19  0819   VANCOMYCIN 9.1 18.8 16.9         Virology:  CMV viral loads  No lab results found.  No results found for: CMQNT, CMVQ  EBV viral loads   No lab results found.  No results found for: EBVDN, EBRES, EBVDN, EBVSP, EBVPC, EBVPCR  HSV testing  No lab results found.    Hepatitis B Testing   Recent Labs   Lab Test 09/20/17  0549   HEPBANG Nonreactive     Hepatitis C Testing     Hepatitis C Antibody   Date Value Ref Range Status   09/20/2017 Nonreactive NR^Nonreactive Final     Comment:     Assay performance characteristics have not been established for newborns,   infants, and children       Respiratory Virus Testing    No results found for: RS, FLUAG

## 2019-11-03 NOTE — PROGRESS NOTES
Care Coordinator Progress Note    Admission Date/Time:  11/2/2019  Attending MD:  Francis Torres MD    Data  Chart reviewed, discussed with interdisciplinary team.   Patient was admitted for:    Fever and chills  On total parenteral nutrition.    Concerns with insurance coverage for discharge needs: None.  Current Living Situation: Patient lives with family.  Support System: Supportive and Involved  Services Involved: Home Infusion  Transportation at Discharge: Family or friend will provide  Transportation to Medical Appointments:   - Name of caregiver: Rose Acevedo, spouse  Barriers to Discharge: None    Coordination of Care and Referrals: Patient on hold with [facility] for TPN; resumption orders entered.     Patient admitted under observation; writer met with patient and his wife to review the CAPPS document. Signed document placed in chart and copy given to patient.     Patient is on hold with [facility] for TPN; confirmed with patient he would like to resume services and updated AVS with order.    Infectious Disease recommendation is pending for antibiotic plan.      Plan  Anticipated Discharge Date:  11/4/2019  Anticipated Discharge Plan:  Home with assist from spouse, home infusion services and clinic follow up as recommended by the team    GERRI Castaneda

## 2019-11-03 NOTE — PROGRESS NOTES
[facility], [facility] Progress Note - Hospitalist Service, Gold 9       Date of Admission:  11/2/2019  Assessment & Plan   Date of Admission: 11/2/2019     Assessment & Plan  Parker Acevedo is a 48 year old man with a history of RnYGB and short gut syndrome w/ severe malnutrition requiring TPN c/b recurrent CLABSIs most recently admitted to [facility] 9/29-10/4/19 w/ granulicatella, staph epi, and strep salivarius CLABSI. Also w/ history of chronic pain, ADHD, tobacco abuse, and NICM. Now being registered to observation for further evaluation of positive outpatient surveillance blood culture.      # Positive culture from PICC  # Recurrent CLABSI  Patient completed vancomycin on 10/11/19 following admit for granulicatella, staph epi, and strep salivarius CLABSI. Has PICC for TPN. Surveillance blood cultures 10/17 were negative. Peripheral 10/29 negative however culture from PICC on 10/30 is + for corynebacterium species (peripheral again negative). Patient currently afebrile, HDS and has no leukocytosis  - Follow repeat blood cultures sent in ED.   - CRP negative    - Per ID instructions no antibiotics to be ordered at this time. Consult placed. If patient becomes febrile they recommend starting vancomycin; note that patient needs Benadryl pre med 30 min prior.  - No TPN tonight      #Chronic Pain. At baseline.   - Continue home Fentanyl patch 25 mcg/hr Q48 hrs and oxycodone 10 mg which he reports taking up to TID.      #Chronic Severe Malnutrition.   #RnYGB.   #Short Gut Syndrome.   Managed on home TPN.   - Holding TPN tonight     #History of NICM. Previously had mildly reduced LVEF to 45-50% but never any clinical e/o CHF. Had neg dobutamine stress 2/2017 and cards started carvedilol w/ plan to discuss ACE-I if tolerating but do not see that he followed up and as of 9/30/19 his LVEF had improved to 55-60%.   - Monitor.      #ADHD. Continue home Adderall.     Diet: Regular Diet  Adult    DVT Prophylaxis: Pneumatic Compression Devices  Sanchez Catheter: not present  Code Status: Full Code      Disposition Plan   Expected discharge: 2 - 3 days, recommended to prior living arrangement once antibiotic plan established.  Entered: Francis Torres MD 11/03/2019, 2:38 PM       The patient's care was discussed with the Bedside Nurse, Patient and Patient's Family.    Francis Torres MD  Hospitalist Service, [address], [place]  Pager: [phone]  Please see sticky note for cross cover information  ______________________________________________________________________    Interval History   No acute events, denies fever, chills, SOB, nausea or vomiting     Data reviewed today: I reviewed all medications, new labs and imaging results over the last 24 hours. I personally reviewed no images or EKG's today.    Physical Exam   Vital Signs: Temp: 99.4  F (37.4  C) Temp src: Oral BP: 126/85 Pulse: 78 Heart Rate: 80 Resp: 16 SpO2: 99 % O2 Device: None (Room air)    Weight: 181 lbs 0 oz  Constitutional: Awake, alert, cooperative, no apparent distress  Respiratory: Clear to auscultation bilaterally, no crackles or wheezing  Cardiovascular: Regular rate and rhythm, normal S1 and S2, and no murmur noted  GI: Normal bowel sounds, soft, non-distended, non-tender  Skin/Integumen: No rashes, no cyanosis, no edema    Data   Recent Labs   Lab 11/03/19  0808 11/02/19  1853 10/30/19  1330 10/29/19  1210   WBC  --  10.4 9.5  --    HGB  --  13.3 13.3  --    MCV  --  96 95  --    PLT  --  179 185  --     140 142 142   POTASSIUM 3.9 3.9 3.5 3.7   CHLORIDE 107 108 109 109   CO2 28 28 23 28   BUN 13 12 13 9   CR 0.76 0.67 0.88 0.67   ANIONGAP 5 4 10 6   SILAS 8.4* 8.5 8.2* 8.3*   * 103* 76 66*   ALBUMIN  --   --  3.2* 3.4   PROTTOTAL  --   --  6.8 6.6*   BILITOTAL  --   --  0.4 0.3   ALKPHOS  --   --  95 92   ALT  --   --  17 20   AST  --   --  11 11     Recent Results (from the past 24  hour(s))   XR Chest 2 Views    Narrative    EXAM: XR CHEST 2 VW  11/2/2019 7:32 PM     HISTORY:  Cough, fever       COMPARISON:  10/2/2019    FINDINGS:   Right PICC tip projects at the cavoatrial junction.    The trachea is midline. The cardiomediastinal silhouette is well  defined. The pulmonary vasculature are distinct.     Mild wedge deformities of the lower thoracic vertebra, otherwise the  included osseous thorax, soft tissues and upper abdomen and are within  normal limits.     Lungs are clear without focal airspace opacity, pleural effusion or  pneumothorax.      Impression    IMPRESSION: No acute airspace opacity.     I have personally reviewed the examination and initial interpretation  and I agree with the findings.    TIANNA FARMER MD

## 2019-11-04 ENCOUNTER — HOME INFUSION (PRE-WILLOW HOME INFUSION) (OUTPATIENT)
Dept: PHARMACY | Facility: CLINIC | Age: 49
End: 2019-11-04

## 2019-11-04 VITALS
HEIGHT: 71 IN | SYSTOLIC BLOOD PRESSURE: 116 MMHG | HEART RATE: 81 BPM | TEMPERATURE: 98.2 F | DIASTOLIC BLOOD PRESSURE: 81 MMHG | OXYGEN SATURATION: 100 % | RESPIRATION RATE: 15 BRPM | BODY MASS INDEX: 27.09 KG/M2 | WEIGHT: 193.5 LBS

## 2019-11-04 LAB
BACTERIA SPEC CULT: NO GROWTH
Lab: NORMAL
SPECIMEN SOURCE: NORMAL

## 2019-11-04 PROCEDURE — 25000132 ZZH RX MED GY IP 250 OP 250 PS 637: Performed by: PEDIATRICS

## 2019-11-04 PROCEDURE — G0378 HOSPITAL OBSERVATION PER HR: HCPCS

## 2019-11-04 PROCEDURE — 99238 HOSP IP/OBS DSCHRG MGMT 30/<: CPT | Performed by: INTERNAL MEDICINE

## 2019-11-04 PROCEDURE — 96376 TX/PRO/DX INJ SAME DRUG ADON: CPT

## 2019-11-04 PROCEDURE — 25000132 ZZH RX MED GY IP 250 OP 250 PS 637: Performed by: PHYSICIAN ASSISTANT

## 2019-11-04 PROCEDURE — 25000128 H RX IP 250 OP 636: Performed by: PHYSICIAN ASSISTANT

## 2019-11-04 PROCEDURE — 25000125 ZZHC RX 250: Performed by: PEDIATRICS

## 2019-11-04 PROCEDURE — 96361 HYDRATE IV INFUSION ADD-ON: CPT

## 2019-11-04 RX ADMIN — ONDANSETRON 4 MG: 4 TABLET, ORALLY DISINTEGRATING ORAL at 05:54

## 2019-11-04 RX ADMIN — SUCRALFATE 1 G: 1 SUSPENSION ORAL at 05:54

## 2019-11-04 RX ADMIN — OXYCODONE HYDROCHLORIDE 10 MG: 5 SOLUTION ORAL at 11:50

## 2019-11-04 RX ADMIN — PANTOPRAZOLE SODIUM 40 MG: 40 TABLET, DELAYED RELEASE ORAL at 07:44

## 2019-11-04 RX ADMIN — Medication 10 ML: at 13:17

## 2019-11-04 RX ADMIN — OXYCODONE HYDROCHLORIDE 10 MG: 5 SOLUTION ORAL at 04:42

## 2019-11-04 RX ADMIN — DEXTROAMPHETAMINE SACCHARATE, AMPHETAMINE ASPARTATE, DEXTROAMPHETAMINE SULFATE AND AMPHETAMINE SULFATE 20 MG: 2.5; 2.5; 2.5; 2.5 TABLET ORAL at 07:44

## 2019-11-04 RX ADMIN — ONDANSETRON 4 MG: 2 INJECTION INTRAMUSCULAR; INTRAVENOUS at 12:54

## 2019-11-04 RX ADMIN — LIDOCAINE HYDROCHLORIDE 30 ML: 20 SOLUTION ORAL; TOPICAL at 04:43

## 2019-11-04 RX ADMIN — Medication 5 ML: at 00:24

## 2019-11-04 RX ADMIN — CARVEDILOL 6.25 MG: 6.25 TABLET, FILM COATED ORAL at 07:44

## 2019-11-04 NOTE — PLAN OF CARE
Vital signs:  Temp: 98.2  F (36.8  C) Temp src: Oral BP: 113/69 Pulse: 81 Heart Rate: 71 Resp: 16 SpO2: 99 % O2 Device: Nasal cannula     Activity: Up independently.   Neuros: A&O x4.   Cardiac: WDL.   Respiratory: WDL ex pt wanted 2L O2 for comfort.   GI/: Voiding spontaneously. +BS, no BM.  Diet: Regular.   Lines: Right PICC one lumen HL, other with IVMF @ 100mL/hr. Pt refused IVMF from [id number], Provider aware.  Incisions/Drains: G to gravity.   Pain/nausea: Oxy given x1. Mylanta given x1.

## 2019-11-04 NOTE — PROGRESS NOTES
This is a recent snapshot of the patient's [facility] medical record.  For current drug dose and complete information and questions, call [phone] or In Basket pool, [facility] home infusion ([id number])  CSN Number:  [id number]

## 2019-11-04 NOTE — PLAN OF CARE
"/77 (BP Location: Left arm)   Pulse 81   Temp 97.9  F (36.6  C) (Oral)   Resp 16   Ht 1.803 m (5' 11\")   Wt 87.8 kg (193 lb 8 oz)   SpO2 98%   BMI 26.99 kg/m      Reason for admission: Further evaluation of positive blood cultures  Neuro: A&Ox4; cooperative with cares; calls appropriately  Cardiac: WNL - denies chest pain  Respiratory: 98% RA; denies SOB  GI/: Voiding AUOP; +BS; LBM 10/2 (pt states he has BM roughly every 4-6 weeks)  Skin: Appropriate for ethnicity  Pain: PRN oxycodone given x1; fentanyl patch in place R shoulder  LDA: R DL PICC infusing D5 1/2NS @ 100cc/hr; G tube to gravity drainage  Activity: Independent  Diet/Appetite: Reg diet; minimal to no appetite  Plan: Continue with POC    "

## 2019-11-04 NOTE — PLAN OF CARE
VSS.  AVS printed and reviewed in person, all questions answered.  All LDA's removed and documented besides 2x R PICC - both lumens hep locked.  All education documented and resolved.  Pt discharge to home, in a car, with spouse.    Observation goals:  - ID consult completed: Met.  - Negative blood cultures from ED: Met

## 2019-11-04 NOTE — PROGRESS NOTES
Observation goals:  - ID consult completed: Not met.  - Negative blood cultures from ED: Pending, 10/30 cultures positive.

## 2019-11-05 ENCOUNTER — PATIENT OUTREACH (OUTPATIENT)
Dept: CARE COORDINATION | Facility: CLINIC | Age: 49
End: 2019-11-05

## 2019-11-05 LAB
BACTERIA SPEC CULT: NO GROWTH
Lab: NORMAL
SPECIMEN SOURCE: NORMAL

## 2019-11-05 RX ORDER — DEXTROAMPHETAMINE SACCHARATE, AMPHETAMINE ASPARTATE, DEXTROAMPHETAMINE SULFATE, AND AMPHETAMINE SULFATE 5; 5; 5; 5 MG/1; MG/1; MG/1; MG/1
TABLET ORAL
Qty: 30 TABLET | Refills: 0 | Status: SHIPPED | OUTPATIENT
Start: 2019-11-05 | End: 2019-12-06

## 2019-11-05 RX ORDER — LORAZEPAM 1 MG/1
TABLET ORAL
Qty: 50 TABLET | Refills: 0 | Status: SHIPPED | OUTPATIENT
Start: 2019-11-05 | End: 2019-11-25

## 2019-11-05 NOTE — PROGRESS NOTES
Date: 11/6/2019 Status: Jean Carlos   Time: 2:30 PM Length: 30   Visit Type: NEW [656] SURI:     Provider: Skinny Friend DPM Department:  PODIATRY     Patient was contacted by an RN for post DC follow up so no duplicate post DC follow up call will be made

## 2019-11-05 NOTE — PROGRESS NOTES
This is a recent snapshot of the patient's [facility] medical record.  For current drug dose and complete information and questions, call [phone] or In Basket pool, [facility] home infusion ([id number])  CSN Number:  [id number]

## 2019-11-05 NOTE — PROGRESS NOTES
Clinic Care Coordination Contact  [facility]/Voicemail       Clinical Data: Care Coordinator Outreach  Outreach attempted x 1.  Left message on patient's voicemail with call back information and requested return call.  Plan: Care Coordinator will try to reach patient again in 1-2 business days.    Melissa Behl BSN, RN, PHN, [facility]  Primary Care Clinical RN Care Coordinator  [facility]   [phone]

## 2019-11-06 ENCOUNTER — ANCILLARY PROCEDURE (OUTPATIENT)
Dept: GENERAL RADIOLOGY | Facility: CLINIC | Age: 49
End: 2019-11-06
Attending: PODIATRIST
Payer: COMMERCIAL

## 2019-11-06 ENCOUNTER — OFFICE VISIT (OUTPATIENT)
Dept: PODIATRY | Facility: CLINIC | Age: 49
End: 2019-11-06
Payer: COMMERCIAL

## 2019-11-06 VITALS
WEIGHT: 192 LBS | SYSTOLIC BLOOD PRESSURE: 126 MMHG | HEIGHT: 71 IN | BODY MASS INDEX: 26.88 KG/M2 | DIASTOLIC BLOOD PRESSURE: 82 MMHG

## 2019-11-06 DIAGNOSIS — Q66.6 PES VALGUS: Primary | ICD-10-CM

## 2019-11-06 DIAGNOSIS — M65.979 SYNOVITIS OF ANKLE: ICD-10-CM

## 2019-11-06 DIAGNOSIS — M79.672 LEFT FOOT PAIN: ICD-10-CM

## 2019-11-06 PROCEDURE — 99203 OFFICE O/P NEW LOW 30 MIN: CPT | Performed by: PODIATRIST

## 2019-11-06 PROCEDURE — 73630 X-RAY EXAM OF FOOT: CPT | Mod: TC

## 2019-11-06 ASSESSMENT — ACTIVITIES OF DAILY LIVING (ADL): DEPENDENT_IADLS:: MEDICATION MANAGEMENT;TRANSPORTATION;SHOPPING

## 2019-11-06 ASSESSMENT — PAIN SCALES - GENERAL: PAINLEVEL: MODERATE PAIN (4)

## 2019-11-06 ASSESSMENT — MIFFLIN-ST. JEOR: SCORE: 1768.04

## 2019-11-06 NOTE — LETTER
11/6/2019         RE: Parker Acevedo  [address]  [facility] [address]        Dear Colleague,    Thank you for referring your patient, Parker Acevedo, to the [facility]. Please see a copy of my visit note below.    HPI:  Parker Acevedo is a 49 year old male who is seen in consultation at the request of self.    Pt presents for eval of:   (Onset, Location, L/R, Character, Treatments, Injury if yes)    XR Left foot today 11/6/2019     Ongoing dorsal Left foot pain. Injured approx 5 years ago. Presents today with well worn athletic shoes.  Constant, sharp, stabbing, throbbing, numbness, swelling, tightness with first steps after sitting, pain 4 - 10  Heat, pain management medication, tylenol, lidocaine, OTC inserts    Gastric bypass, and now tpn and fluid thorugh PICC line.    Patient is disabled.    Weight management plan: Patient was referred to their PCP to discuss a diet and exercise plan.     Patient to follow up with Primary Care provider regarding elevated blood pressure.    ROS:  10 point ROS neg other than the symptoms noted above in the HPI.    Patient Active Problem List   Diagnosis     Peptic ulcer disease     Gastric bypass status for obesity     Chronic abdominal pain     Vomiting     Anemia     ADHD (attention deficit hyperactivity disorder), inattentive type     Vitamin B12 deficiency without anemia     Thiamine deficiency     Dehydration     Dysphagia     Weight loss, non-intentional     Malnutrition (H)     Chronic anxiety     Constipation     Bile reflux esophagitis     Former smoker     Vitamin D deficiency     Iron deficiency     Health Care Home     Chronic pain     Insomnia     Positive blood culture     Fungemia     Chronic nausea     Gastrostomy tube in place (H)     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)     Short gut syndrome     Anxiety     Status post cervical spinal arthrodesis     Port or reservoir infection, initial encounter      Abnormal echocardiogram     Low serum iron     Anemia, iron deficiency     Catheter-related bloodstream infection (CRBSI)     Generalized weakness     S/P bariatric surgery     Hyperlipidemia LDL goal <130     Bacteremia     Infection by Candida species     PICC line infiltration, sequela     Gram-positive bacteremia     On total parenteral nutrition       PAST MEDICAL HISTORY:   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     Complication of anesthesia      Difficulty swallowing      Gastric ulcer, unspecified as acute or chronic, without mention of hemorrhage, perforation, or obstruction      Gastro-oesophageal reflux disease      Head injury      Hiatal hernia      Other bladder disorder      Other chronic pain      PONV (postoperative nausea and vomiting)      Severe malnutrition (H)     TPN     Short gut syndrome      Tobacco abuse         PAST SURGICAL HISTORY:   Past Surgical History:   Procedure Laterality Date     AMPUTATION       APPENDECTOMY       BACK SURGERY  11/3/2014    curve in the spine     BIOPSY LYMPH NODE CERVICAL N/A 2/20/2015    Procedure: BIOPSY LYMPH NODE CERVICAL;  Surgeon: Baron Scanlon MD;  Location: [facility]     C GASTRIC BYPASS,OBESE<100CM SHALYEE-EN-Y  2002    lost 300 pounds     CHOLECYSTECTOMY       DISCECTOMY, FUSION CERVICAL ANTERIOR ONE LEVEL, COMBINED N/A 2/15/2017    Procedure: COMBINED DISCECTOMY, FUSION CERVICAL ANTERIOR ONE LEVEL;  Surgeon: Darren Campos MD;  Location: [facility]     ENDOSCOPIC INSERTION TUBE GASTROSTOMY  9/9/2013    Procedure: ENDOSCOPIC INSERTION TUBE GASTROSTOMY;;  Surgeon: Francis yVas MD;  [facility]     ENDOSCOPIC ULTRASOUND UPPER GASTROINTESTINAL TRACT (GI)  4/29/2011     Procedure:ENDOSCOPIC ULTRASOUND UPPER GASTROINTESTINAL TRACT (GI); Both Procedures done Conjointly; Surgeon:NEREIDA HOUSER; [facility]     ENDOSCOPIC ULTRASOUND UPPER GASTROINTESTINAL TRACT (GI)  9/9/2013    Procedure: ENDOSCOPIC ULTRASOUND UPPER GASTROINTESTINAL TRACT (GI);  Endoscopic Ultrasound Guide Gastrostomy Tube Placement  C-arm;  Surgeon: Noe Lizarraga MD;  [facility]     ENDOSCOPY  03/25/11    EGD, [facility]     ENDOSCOPY  08/04/09    Upper Endoscopy, [facility]     ENDOSCOPY  01/05/09    Upper Endoscopy, [facility]     ESOPHAGOSCOPY, GASTROSCOPY, DUODENOSCOPY (EGD), COMBINED  4/20/2011    Procedure:COMBINED ESOPHAGOSCOPY, GASTROSCOPY, DUODENOSCOPY (EGD); Surgeon:FRANCIS VYAS; [facility]     ESOPHAGOSCOPY, GASTROSCOPY, DUODENOSCOPY (EGD), COMBINED  6/15/2011    Procedure:COMBINED ESOPHAGOSCOPY, GASTROSCOPY, DUODENOSCOPY (EGD); Surgeon:FRANCIS VYAS; [facility]     ESOPHAGOSCOPY, GASTROSCOPY, DUODENOSCOPY (EGD), COMBINED  6/12/2013    Procedure: COMBINED ESOPHAGOSCOPY, GASTROSCOPY, DUODENOSCOPY (EGD);;  Surgeon: Francis Vyas MD;  [facility]     ESOPHAGOSCOPY, GASTROSCOPY, DUODENOSCOPY (EGD), COMBINED  11/22/2013    Procedure: COMBINED ESOPHAGOSCOPY, GASTROSCOPY, DUODENOSCOPY (EGD);;  Surgeon: Francis Vyas MD;  [facility]     ESOPHAGOSCOPY, GASTROSCOPY, DUODENOSCOPY (EGD), COMBINED  4/30/2014    Procedure: COMBINED ESOPHAGOSCOPY, GASTROSCOPY, DUODENOSCOPY (EGD);  Surgeon: Francis Vyas MD;  [facility]     ESOPHAGOSCOPY, GASTROSCOPY, DUODENOSCOPY (EGD), COMBINED N/A 2/20/2015    Procedure: COMBINED ESOPHAGOSCOPY, GASTROSCOPY, DUODENOSCOPY (EGD), BIOPSY SINGLE OR MULTIPLE;  Surgeon: Baron Scanlon MD;  Location:  OR     ESOPHAGOSCOPY, GASTROSCOPY, DUODENOSCOPY (EGD), COMBINED N/A 9/30/2015    Procedure: COMBINED ESOPHAGOSCOPY, GASTROSCOPY, DUODENOSCOPY (EGD);  Surgeon: Francis Vyas MD;   Location:  GI     ESOPHAGOSCOPY, GASTROSCOPY, DUODENOSCOPY (EGD), COMBINED N/A 10/3/2019    Procedure: Upper Endoscopy;  Surgeon: Clif Morrow MD;  [facility]     GASTRECTOMY  6/22/2012    Procedure: GASTRECTOMY;  Open Approach, Excise Ulcers,Partial Gastrectomy, Esophagojejunostomy, Hiatal Hernia Repair, Extensive Lysis of Adhesions and Esaphagogastrodudenoscopy.;  Surgeon: Francis Vyas MD;  [facility]     GASTROJEJUNOSTOMY  08/26/09    Extensice enterolysis, partial resect. jejunum, part. resect gastric pouch, gastrojejunostomy anastomosis     HC ESOPH/GAS REFLUX TEST W NASAL IMPED ELECTRODE  8/5/2013    Procedure: ESOPHAGEAL IMPEDENCE FUNCTION TEST 1 HOUR OR LESS;  Surgeon: Halie Lang MD;  Location:  GI     HEAD & NECK SURGERY  2/15/2017    C5-C6     HERNIA REPAIR  2006    Umbilical hernia     HERNIORRHAPHY HIATAL  6/22/2012    Procedure: HERNIORRHAPHY HIATAL;;  Surgeon: Francis Vyas MD;  [facility]     HERNIORRHAPHY INGUINAL  11/22/2013    Procedure: HERNIORRHAPHY INGUINAL;;  Surgeon: Francis Vyas MD;  [facility]     INSERT PORT VASCULAR ACCESS Right 12/19/2017    Procedure: INSERT PORT VASCULAR ACCESS;  Right Chest Port Placement ;  Surgeon: Lisandro Alejandro PA-C;  [facility]     INSERT PORT VASCULAR ACCESS Right 8/2/2018    Procedure: INSERT PORT VASCULAR ACCESS;  Place single lumen tunneled central venous access catheter;  Surgeon: Guy Jamil PA-C;  [facility]     IR CVC TUNNEL PLACEMENT > 5 YRS OF AGE  8/7/2019     IR CVC TUNNEL REMOVAL RIGHT  10/1/2019     IR FOLLOW UP VISIT OUTPATIENT  8/7/2019     IR PICC PLACEMENT > 5 YRS OF AGE  3/7/2019     LAPAROTOMY EXPLORATORY  11/22/2013    Procedure: LAPAROTOMY EXPLORATORY;  Exploratory Laparotomy, Upper Endoscopy, Left Inguinal Hernia Repair;  Surgeon: Francis Vyas MD;  [facility]     ORTHOPEDIC SURGERY       PICC INSERTION Right 03/16/2017    5fr DL  BioFlo PICC, 42cm (3cm external) in the R medial brachial vein w/ tip in the SVC RA junction.     PICC INSERTION Left 09/23/2017    5fr DL BioFlo PICC, 45cm (1cm external) in the L basilic vein w/ tip in the SVC RA junction.     PICC INSERTION Right 05/16/2019    5Fr - 43cm, Medial brachial vein, low SVC     PICC INSERTION Right 10/02/2019    5Fr - 43cm (2cm external), basilic vein, low SVC     SHAYLEE EN Y BOWEL  2003     SOFT TISSUE SURGERY       THORACIC SURGERY       TONSILLECTOMY       TRANSESOPHAGEAL ECHOCARDIOGRAM INTRAOPERATIVE N/A 1/8/2019    Procedure: TRANSESOPHAGEAL ECHOCARDIOGRAM INTRAOPERATIVE;  Surgeon: GENERIC ANESTHESIA PROVIDER;  [facility]        MEDICATIONS:   Current Outpatient Medications:      acetaminophen (TYLENOL) 32 mg/mL liquid, Take 500 mg by mouth every 4 hours as needed for fever or mild pain, Disp: , Rfl:      ADDERALL 20 MG tablet, TAKE 1 TABLET (20 MG) BY MOUTH DAILY, Disp: 30 tablet, Rfl: 0     albuterol (PROAIR HFA/PROVENTIL HFA/VENTOLIN HFA) 108 (90 Base) MCG/ACT inhaler, Inhale 2 puffs into the lungs every 4 hours as needed for shortness of breath / dyspnea or wheezing, Disp: 1 Inhaler, Rfl: 1     blood glucose (NO BRAND SPECIFIED) lancets standard, Use to test blood sugar 1 times daily or as directed., Disp: 100 each, Rfl: 1     blood glucose (NO BRAND SPECIFIED) test strip, Use to test blood sugar 1 times daily or as directed., Disp: 100 strip, Rfl: 3     blood glucose monitoring (NO BRAND SPECIFIED) meter device kit, Use to test blood sugar 1 times daily or as directed., Disp: 1 kit, Rfl: 0     carvedilol (COREG) 6.25 MG tablet, Take 6.25 mg by mouth 2 times daily (with meals), Disp: , Rfl:      cyanocobalamin (CYANOCOBALAMIN) 1000 MCG/ML injection, Inject 1 mL (1,000 mcg) into the muscle every 30 days, Disp: 1 mL, Rfl: 11     [facility] HOME INFUSION MANAGED PATIENT, Contact [facility] for patient specific medication information at [phone] on admission and  "discharge from the hospital.  Phones are answered 24 hours a day 7 days a week 365 days a year.  Providers - Choose \"CONTINUE HOME MED (no script)\" at discharge if patient treatment with home infusion will continue., Disp: , Rfl:      fentaNYL (DURAGESIC) 25 mcg/hr 72 hr patch, Place 1 patch onto the skin every 48 hours remove old patch.   May fill 11/5/19 and start 11/7/19., Disp: 15 patch, Rfl: 0     lidocaine (LIDODERM) 5 % Patch, Place 1-2 patches onto the skin every 24 hours Wear for 12 hours, remove for 12 hours.  OK to cut to better fit to size., Disp: 60 patch, Rfl: 6     lidocaine, alum & mag hydroxide-simethicone GI Cocktail, 30 ml daily as needed for mouth/stomach pain., Disp: 1 Bottle, Rfl: 1     LORazepam (ATIVAN) 1 MG tablet, TAKE 1-2 TABLETS (1MG TO 2MG) BY MOUTH AS NEEDED FOR ANXIETY WITH TPN AND MEDICATIONS, Disp: 50 tablet, Rfl: 0     LORazepam (ATIVAN) 1 MG tablet, 1-2 mg as needed for anxiety with TPN and medications, Disp: 50 tablet, Rfl: 0     Needle, Disp, (BD DISP NEEDLES) 27G X 1/2\" MISC, 1 Device every 30 days Use for cyanocobalamin injection once q 30 days., Disp: 3 each, Rfl: 4     ondansetron (ZOFRAN-ODT) 8 MG ODT tab, DISSOLVE ONE TABLET ON TONGUE EVERY 8 HOURS AS NEEDED FOR NAUSEA, Disp: 90 tablet, Rfl: 1     order for DME, Equipment being ordered: Urine Drainage bag, leg., Disp: 15 Units, Rfl: 11     order for DME, Equipment being ordered: Bilateral knee high chronic venous insufficiency stockings--  mild-moderate pressures., Disp: 4 each, Rfl: 5     oxyCODONE (ROXICODONE) 5 MG/5ML solution, Take 10 mLs (10 mg) by mouth daily as needed for pain . 30 day supply. May fill on 10/14/19 to start on 10/15/19, Disp: 300 mL, Rfl: 0     pantoprazole (PROTONIX) 40 MG EC tablet, Take 1 tablet (40 mg) by mouth daily, Disp: 30 tablet, Rfl: 3     polyethylene glycol (MIRALAX/GLYCOLAX) powder, Take 17 g (1 capful) by mouth daily, Disp: 578 g, Rfl: 11     sodium chloride 0.9% infusion, Inject " 1,000-2,000 mLs into the vein as needed , Disp: , Rfl:      sucralfate (CARAFATE) 1 GM tablet, Take 1 tablet (1 g) by mouth 4 times daily, Disp: 120 tablet, Rfl: 3     UNABLE TO FIND, States takes TPN 2050 ml  Every 14 hours through PICC, Disp: , Rfl:      vitamin D2 (ERGOCALCIFEROL) 67087 units (1250 mcg) capsule, Take 1 capsule (50,000 Units) by mouth once a week, Disp: 12 capsule, Rfl: 3     naloxone (NARCAN) 4 MG/0.1ML nasal spray, Spray 4 mg into one nostril alternating nostrils once as needed every 2-3 minutes until assistance arrives, Disp: , Rfl:      ALLERGIES:    Allergies   Allergen Reactions     Bactrim [Sulfamethoxazole W/Trimethoprim] Rash     Penicillins Anaphylaxis     Please see Antimicrobial Management Team allergy assessment note 10/10/2018. Patient reported tolerating amoxicillin.     Doxycycline Rash     Vancomycin Rash     Rash after receiving vancomycin 3/28/16 (red man's?). Tolerated with slower infusion and diphenhydramine premed.        SOCIAL HISTORY:   Social History     Socioeconomic History     Marital status:      Spouse name: Rose     Number of children: 1     Years of education: Not on file     Highest education level: Not on file   Occupational History     Not on file   Social Needs     Financial resource strain: Not on file     Food insecurity:     Worry: Not on file     Inability: Not on file     Transportation needs:     Medical: Not on file     Non-medical: Not on file   Tobacco Use     Smoking status: Current Some Day Smoker     Packs/day: 0.25     Years: 3.00     Pack years: 0.75     Types: Cigarettes     Last attempt to quit: 2018     Years since quittin.2     Smokeless tobacco: Never Used     Tobacco comment: I use an e cig every now and than   Substance and Sexual Activity     Alcohol use: No     Comment: quit      Drug use: No     Sexual activity: Yes     Partners: Female     Birth control/protection: None     Comment: no protection   Lifestyle      "Physical activity:     Days per week: Not on file     Minutes per session: Not on file     Stress: Not on file   Relationships     Social connections:     Talks on phone: Not on file     Gets together: Not on file     Attends Taoist service: Not on file     Active member of club or organization: Not on file     Attends meetings of clubs or organizations: Not on file     Relationship status: Not on file     Intimate partner violence:     Fear of current or ex partner: Not on file     Emotionally abused: Not on file     Physically abused: Not on file     Forced sexual activity: Not on file   Other Topics Concern     Parent/sibling w/ CABG, MI or angioplasty before 65F 55M? No   Social History Narrative     Not on file        FAMILY HISTORY:   Family History   Problem Relation Age of Onset     Gastrointestinal Disease Mother         Crohns disease     Anxiety Disorder Mother      Thyroid Disease Mother         Grave's disease     Cancer Father         ear cancer-skin cancer/melanoma     Breast Cancer Maternal Grandmother      Macular Degeneration Maternal Grandfather      Anxiety Disorder Sister      Diabetes Maternal Uncle      Breast Cancer Other      Hypertension No family hx of      Hyperlipidemia No family hx of      Cerebrovascular Disease No family hx of      Prostate Cancer No family hx of      Depression No family hx of      Anesthesia Reaction No family hx of      Asthma No family hx of      Osteoporosis No family hx of      Genetic Disorder No family hx of      Obesity No family hx of      Mental Illness No family hx of      Substance Abuse No family hx of      Glaucoma No family hx of         EXAM:Vitals: /82 (BP Location: Left arm, Cuff Size: Adult Regular)   Ht 1.803 m (5' 11\")   Wt 87.1 kg (192 lb)   BMI 26.78 kg/m     BMI= Body mass index is 26.78 kg/m .    General appearance: Patient is alert and fully cooperative with history & exam.  No sign of distress is noted during the visit.   "   Psychiatric: Affect is pleasant & appropriate.  Patient appears motivated to improve health.     Respiratory: Breathing is regular & unlabored while sitting.     HEENT: Hearing is intact to spoken word.  Speech is clear.  No gross evidence of visual impairment that would impact ambulation.     Vascular: DP & PT pulses are intact & regular bilaterally.  No significant edema or varicosities noted.  CFT and skin temperature is normal to both lower extremities.     Neurologic: Lower extremity sensation is intact to light touch.  No evidence of weakness or contracture in the lower extremities.  No evidence of neuropathy.    Dermatologic: Skin is intact to both lower extremities with adequate texture, turgor and tone about the integument.  No paronychia or evidence of soft tissue infection is noted.     Musculoskeletal: Patient is ambulatory without assistive device or brace.  Generalized valgus foot deformity noted bilateral there is crepitus and subluxation or hypomobility throughout the first second metatarsal cuneiform joint.  Reproduction of pain is noted with this maneuver.  No bony hypertrophy noted.    Radiographs: Moderate to severe pes valgus deformity noted bilateral with diminished calcaneal inclination angle increased talar declination angle.     ASSESSMENT:       ICD-10-CM    1. Pes valgus Q66.6 ORTHOTICS REFERRAL   2. Synovitis of ankle M65.9         PLAN:  Reviewed patient's chart in [facility].      11/6/2019   Patient does have instability about the medial column with quite significant pes valgus.  Recommend custom molded orthotics and more sturdy shoe gear.  Current shoe gear is very flexible and crushed broken down likely years ago.  Written instructions were dispensed for more supportive shoe gear and order for custom molded orthotics dispensed.  Follow-up after utilizing the orthotics and more sturdy shoe gear.      Skinny Friend DPM      Again, thank you for allowing me to participate in the care of your  patient.        Sincerely,        Skinny Friend DPM

## 2019-11-06 NOTE — PROGRESS NOTES
Clinic Care Coordination Contact    Clinic Care Coordination Contact  OUTREACH    Referral Information:  Referral Source: IP Report    Primary Diagnosis: SIRS/Sepsis    Chief Complaint   Patient presents with     Clinic Care Coordination - Post Hospital     RN        Universal Utilization: Patient is followed Infectious Disease, Pain Management, Cardiology and Bariatric Surgery  Clinic Utilization  Difficulty keeping appointments:: No  Compliance Concerns: Yes  No-Show Concerns: No  No PCP office visit in Past Year: No  Utilization    Last refreshed: 11/6/2019  8:26 AM:  Hospital Admissions 5           Last refreshed: 11/6/2019  8:26 AM:  ED Visits 3           Last refreshed: 11/6/2019  8:26 AM:  No Show Count (past year) 15              Current as of: 11/6/2019  8:26 AM              Clinical Concerns:  Current Medical Concerns:  Patient was inpatient at [facility] 11/2/19-11/4/19 for bacteremia due to positive PICC blood culture that was mostly likely due to contamination.  RN CC spoke with patient and spouse Rose (consent to communicate on file).  Patient has had no elevated temperatures since arriving home and has tolerated IV hydration and TPN.  Patient has follow up with PCP scheduled for 11/25/19.  Patient had not followed through on previous goals of scheduling appointments with surgery and and cardiology, but spouse states she will work on this.    Patient's RLE edema has resolved.  He has an appointment with podiatry today due to new onset left foot pain.    Patient continues to receive services from [facility] with nursing infusion visits from [facility].  Patient's chronic pain continues to persist and patient and spouse are hopeful that he will be eligible for a pain pump and will discuss with his pain provider at his future appointment on 11/13/19.    Patient Active Problem List   Diagnosis     Peptic ulcer disease     Gastric bypass status for obesity     Chronic abdominal pain      Vomiting     Anemia     ADHD (attention deficit hyperactivity disorder), inattentive type     Vitamin B12 deficiency without anemia     Thiamine deficiency     Dehydration     Dysphagia     Weight loss, non-intentional     Malnutrition (H)     Chronic anxiety     Constipation     Bile reflux esophagitis     Former smoker     Vitamin D deficiency     Iron deficiency     Health Care Home     Chronic pain     Insomnia     Positive blood culture     Fungemia     Chronic nausea     Gastrostomy tube in place (H)     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)     Short gut syndrome     Anxiety     Status post cervical spinal arthrodesis     Port or reservoir infection, initial encounter     Abnormal echocardiogram     Low serum iron     Anemia, iron deficiency     Catheter-related bloodstream infection (CRBSI)     Generalized weakness     S/P bariatric surgery     Hyperlipidemia LDL goal <130     Bacteremia     Infection by Candida species     PICC line infiltration, sequela     Gram-positive bacteremia     On total parenteral nutrition      Current Behavioral Concerns: Patient's anxiety and ADHD is managed with medications.    Education Provided to patient: RN CC reviewed hospital discharge instructions and current plan of care and needed appointments with surgery and cardiology.     Pain  Pain (GOAL):: Yes  Type: Chronic (>3mo)  Location of chronic pain:: chronic abdominal pain and lower back pain  Radiating: No  Progression: Waxing and Waning  Description of pain: Aching  Chronic pain severity:: 6  Limitation of routine activities due to chronic pain:: Yes  Description: Unable to perform most daily activities (chores, hobbies, social activities, driving)  Alleviating Factors: Pain Medication, Rest, Heat  Aggravating Factors: Eating, Positioning, Activity  Health Maintenance Reviewed: Due/Overdue   Health Maintenance Due   Topic Date Due     MEDICARE ANNUAL WELLNESS VISIT  06/21/2017     TOBACCO CESSATION COUNSELING   02/06/2019     CIERRA ASSESSMENT  07/30/2019     PHQ-9  07/30/2019      Clinical Pathway: None    Medication Management:  Medication reconciliation completed.  Spouse assists with medication management.     Functional Status:  Dependent ADLs:: Ambulation-cane, Bathing  Dependent IADLs:: Medication Management, Transportation, Shopping  Bed or wheelchair confined:: No  Mobility Status: Independent    Living Situation:  Current living arrangement:: I live in a private home with spouse  Type of residence:: Private home - stairs    Diet/Exercise/Sleep:  Diet:: Regular(But also has TPN)  Inadequate nutrition (GOAL):: No  Food Insecurity: No  Tube Feeding: No  Exercise:: Currently not exercising  Inadequate activity/exercise (GOAL):: No  Significant changes in sleep pattern (GOAL): No    Transportation:  Transportation concerns (GOAL):: No  Transportation means:: Regular car     Psychosocial:  Hindu or spiritual beliefs that impact treatment:: No  Mental health DX:: Yes  Mental health DX how managed:: Medication  Mental health management concern (GOAL):: No  Informal Support system:: Family, Spouse     Financial/Insurance:   Financial/Insurance concerns (GOAL):: No       Resources and Interventions:  Current Resources:   List of home care services:: Skilled Nursing, Physicial Therapy;   Community Resources: Home Infusion, Home Care  Supplies used at home:: None  Equipment Currently Used at Home: cane, straight, shower chair    Advance Care Plan/Directive  Advanced Care Plans/Directives on file:: Yes  Type Advanced Care Plans/Directives: Advanced Directive - On File  Advanced Care Plan/Directive Status: Not Applicable    Referrals Placed: None     Goals:   Goals        General    #1 Medical (pt-stated)     Notes - Note edited  10/7/2019  1:12 PM by Behl, Melissa K, RN    Goal Statement: I want to be free of infection.  Measure of Success: Patient will remain free of any infections.  Supportive Steps to Achieve: New PICC  line placed, home infusion services, follow up appointments as instructed, washing bathtub/shower with bleach prior to bathing  Barriers: chronic TPN  Strengths: care coordination, home infusion services  Date to Achieve By: 1/1/20  Patient expressed understanding of goal: yes         #2 Medical (pt-stated)     Notes - Note created  10/7/2019 12:42 PM by Behl, Melissa K, RN    Goal Statement: I will see cardiology.  Measure of Success: Appointment will be scheduled and attended.  Supportive Steps to Achieve: Review of hospital discharge instructions.  Barriers: multiple specialists and appointments  Strengths: care coordination, supportive spouse  Date to Achieve By: 12/1/19  Patient expressed understanding of goal: yes         #3 Medical (pt-stated)     Notes - Note created  10/7/2019 12:43 PM by Behl, Melissa K, RN    Goal Statement: I will see surgery as instructed.  Measure of Success: Appointment will be scheduled and attended.  Supportive Steps to Achieve: Review of hospital discharge instructions.  Barriers: multiple specialists and discharge instructions.  Strengths: care coordination, supportive spouse  Date to Achieve By: 12/1/19  Patient expressed understanding of goal: yes                 Patient/Caregiver understanding: Spouse verbalized understanding of information provided and current plan of care.    Outreach Frequency: monthly  Future Appointments              Today [id number] [facility] [place]    Today Skinny Friend DPM [facility]    In 1 week Patti Milligan MD [facility] [place], [facility]    In 2 weeks Chuy Isaac MD [facility]          Plan:   1. Patient will schedule appointments with surgery and cardiology.  2. Patient will follow up with PCP and pain provider as scheduled.  3. Patient will see podiatry today to address new left foot pain.  4. RN CC will follow up with patient/spouse  monthly and as needed.    Melissa Behl BSN, RN, PHN, CCM  Primary Care Clinical RN Care Coordinator  [facility]   [phone]

## 2019-11-06 NOTE — PROGRESS NOTES
HPI:  Parker Acevedo is a 49 year old male who is seen in consultation at the request of self.    Pt presents for eval of:   (Onset, Location, L/R, Character, Treatments, Injury if yes)    XR Left foot today 11/6/2019     Ongoing dorsal Left foot pain. Injured approx 5 years ago. Presents today with well worn athletic shoes.  Constant, sharp, stabbing, throbbing, numbness, swelling, tightness with first steps after sitting, pain 4 - 10  Heat, pain management medication, tylenol, lidocaine, OTC inserts    Gastric bypass, and now tpn and fluid thorugh PICC line.    Patient is disabled.    Weight management plan: Patient was referred to their PCP to discuss a diet and exercise plan.     Patient to follow up with Primary Care provider regarding elevated blood pressure.    ROS:  10 point ROS neg other than the symptoms noted above in the HPI.    Patient Active Problem List   Diagnosis     Peptic ulcer disease     Gastric bypass status for obesity     Chronic abdominal pain     Vomiting     Anemia     ADHD (attention deficit hyperactivity disorder), inattentive type     Vitamin B12 deficiency without anemia     Thiamine deficiency     Dehydration     Dysphagia     Weight loss, non-intentional     Malnutrition (H)     Chronic anxiety     Constipation     Bile reflux esophagitis     Former smoker     Vitamin D deficiency     Iron deficiency     Health Care Home     Chronic pain     Insomnia     Positive blood culture     Fungemia     Chronic nausea     Gastrostomy tube in place (H)     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)     Short gut syndrome     Anxiety     Status post cervical spinal arthrodesis     Port or reservoir infection, initial encounter     Abnormal echocardiogram     Low serum iron     Anemia, iron deficiency     Catheter-related bloodstream infection (CRBSI)     Generalized weakness     S/P bariatric surgery     Hyperlipidemia LDL goal <130     Bacteremia     Infection by Candida species     PICC  line infiltration, sequela     Gram-positive bacteremia     On total parenteral nutrition       PAST MEDICAL HISTORY:   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     Complication of anesthesia      Difficulty swallowing      Gastric ulcer, unspecified as acute or chronic, without mention of hemorrhage, perforation, or obstruction      Gastro-oesophageal reflux disease      Head injury      Hiatal hernia      Other bladder disorder      Other chronic pain      PONV (postoperative nausea and vomiting)      Severe malnutrition (H)     TPN     Short gut syndrome      Tobacco abuse         PAST SURGICAL HISTORY:   Past Surgical History:   Procedure Laterality Date     AMPUTATION       APPENDECTOMY       BACK SURGERY  11/3/2014    curve in the spine     BIOPSY LYMPH NODE CERVICAL N/A 2/20/2015    Procedure: BIOPSY LYMPH NODE CERVICAL;  Surgeon: Baron Scanlon MD;  Location: [facility]     C GASTRIC BYPASS,OBESE<100CM SHAYLEE-EN-Y  2002    lost 300 pounds     CHOLECYSTECTOMY       DISCECTOMY, FUSION CERVICAL ANTERIOR ONE LEVEL, COMBINED N/A 2/15/2017    Procedure: COMBINED DISCECTOMY, FUSION CERVICAL ANTERIOR ONE LEVEL;  Surgeon: Darren Campos MD;  [facility]     ENDOSCOPIC INSERTION TUBE GASTROSTOMY  9/9/2013    Procedure: ENDOSCOPIC INSERTION TUBE GASTROSTOMY;;  Surgeon: Francis Vyas MD;  [facility]     ENDOSCOPIC ULTRASOUND UPPER GASTROINTESTINAL TRACT (GI)  4/29/2011    Procedure:ENDOSCOPIC ULTRASOUND UPPER GASTROINTESTINAL TRACT (GI); Both Procedures done Conjointly; Surgeon:NEREIDA HOUSER; [facility]     ENDOSCOPIC ULTRASOUND UPPER GASTROINTESTINAL TRACT (GI)  9/9/2013    Procedure: ENDOSCOPIC ULTRASOUND UPPER  GASTROINTESTINAL TRACT (GI);  Endoscopic Ultrasound Guide Gastrostomy Tube Placement  C-arm;  Surgeon: Noe Lizarraga MD;  [facility]     ENDOSCOPY  03/25/11    [facility]     ENDOSCOPY  08/04/09    [facility] End[facility]     ENDOSCOPY  01/05/09    [facility] End[facility]     ESOPHAGOSCOPY, GASTROSCOPY, DUODENOSCOPY (EGD), COMBINED  4/20/2011    Procedure:COMBINED ESOPHAGOSCOPY, GASTROSCOPY, DUODENOSCOPY (EGD); Surgeon:FRANCIS VYAS; [facility]     ESOPHAGOSCOPY, GASTROSCOPY, DUODENOSCOPY (EGD), COMBINED  6/15/2011    Procedure:COMBINED ESOPHAGOSCOPY, GASTROSCOPY, DUODENOSCOPY (EGD); Surgeon:FRANCIS VYAS; [facility]     ESOPHAGOSCOPY, GASTROSCOPY, DUODENOSCOPY (EGD), COMBINED  6/12/2013    Procedure: COMBINED ESOPHAGOSCOPY, GASTROSCOPY, DUODENOSCOPY (EGD);;  Surgeon: Francis Vyas MD;  [facility]     ESOPHAGOSCOPY, GASTROSCOPY, DUODENOSCOPY (EGD), COMBINED  11/22/2013    Procedure: COMBINED ESOPHAGOSCOPY, GASTROSCOPY, DUODENOSCOPY (EGD);;  Surgeon: Francis Vyas MD;  [facility]     ESOPHAGOSCOPY, GASTROSCOPY, DUODENOSCOPY (EGD), COMBINED  4/30/2014    Procedure: COMBINED ESOPHAGOSCOPY, GASTROSCOPY, DUODENOSCOPY (EGD);  Surgeon: Francis Vyas MD;  [facility]     ESOPHAGOSCOPY, GASTROSCOPY, DUODENOSCOPY (EGD), COMBINED N/A 2/20/2015    Procedure: COMBINED ESOPHAGOSCOPY, GASTROSCOPY, DUODENOSCOPY (EGD), BIOPSY SINGLE OR MULTIPLE;  Surgeon: Baron Scanlon MD;  Location:  OR     ESOPHAGOSCOPY, GASTROSCOPY, DUODENOSCOPY (EGD), COMBINED N/A 9/30/2015    Procedure: COMBINED ESOPHAGOSCOPY, GASTROSCOPY, DUODENOSCOPY (EGD);  Surgeon: Francis Vyas MD;  [facility]     ESOPHAGOSCOPY, GASTROSCOPY, DUODENOSCOPY (EGD), COMBINED N/A 10/3/2019    Procedure: Upper Endoscopy;  Surgeon: Clif Morrow MD;  [facility]     GASTRECTOMY  6/22/2012    Procedure: GASTRECTOMY;  Open Approach, Excise  Ulcers,Partial Gastrectomy, Esophagojejunostomy, Hiatal Hernia Repair, Extensive Lysis of Adhesions and Esaphagogastrodudenoscopy.;  Surgeon: Francis Vyas MD;  [facility]     GASTROJEJUNOSTOMY  08/26/09    Extensice enterolysis, partial resect. jejunum, part. resect gastric pouch, gastrojejunostomy anastomosis     HC ESOPH/GAS REFLUX TEST W NASAL IMPED ELECTRODE  8/5/2013    Procedure: ESOPHAGEAL IMPEDENCE FUNCTION TEST 1 HOUR OR LESS;  Surgeon: Halie Lang MD;  [facility]     HEAD & NECK SURGERY  2/15/2017    C5-C6     HERNIA REPAIR  2006    Umbilical hernia     HERNIORRHAPHY HIATAL  6/22/2012    Procedure: HERNIORRHAPHY HIATAL;;  Surgeon: Francis Vyas MD;  [facility]     HERNIORRHAPHY INGUINAL  11/22/2013    Procedure: HERNIORRHAPHY INGUINAL;;  Surgeon: Francis Vyas MD;  [facility]     INSERT PORT VASCULAR ACCESS Right 12/19/2017    Procedure: INSERT PORT VASCULAR ACCESS;  Right Chest Port Placement ;  Surgeon: Lisandro Alejandro PA-C;  [facility]     INSERT PORT VASCULAR ACCESS Right 8/2/2018    Procedure: INSERT PORT VASCULAR ACCESS;  Place single lumen tunneled central venous access catheter;  Surgeon: Guy Jamil PA-C;  [facility]     IR CVC TUNNEL PLACEMENT > 5 YRS OF AGE  8/7/2019     IR CVC TUNNEL REMOVAL RIGHT  10/1/2019     IR FOLLOW UP VISIT OUTPATIENT  8/7/2019     IR PICC PLACEMENT > 5 YRS OF AGE  3/7/2019     LAPAROTOMY EXPLORATORY  11/22/2013    Procedure: LAPAROTOMY EXPLORATORY;  Exploratory Laparotomy, Upper Endoscopy, Left Inguinal Hernia Repair;  Surgeon: Francis Vyas MD;  [facility]     ORTHOPEDIC SURGERY       PICC INSERTION Right 03/16/2017    5fr DL BioFlo PICC, 42cm (3cm external) in the R medial brachial vein w/ tip in the SVC RA junction.     PICC INSERTION Left 09/23/2017    5fr DL BioFlo PICC, 45cm (1cm external) in the L basilic vein w/ tip in the SVC RA junction.     PICC INSERTION Right  "05/16/2019    5Fr - 43cm, Medial brachial vein, low SVC     PICC INSERTION Right 10/02/2019    5Fr - 43cm (2cm external), basilic vein, low SVC     SHAYLEE EN Y BOWEL  2003     SOFT TISSUE SURGERY       THORACIC SURGERY       TONSILLECTOMY       TRANSESOPHAGEAL ECHOCARDIOGRAM INTRAOPERATIVE N/A 1/8/2019    Procedure: TRANSESOPHAGEAL ECHOCARDIOGRAM INTRAOPERATIVE;  Surgeon: GENERIC ANESTHESIA PROVIDER;  [facility]        MEDICATIONS:   Current Outpatient Medications:      acetaminophen (TYLENOL) 32 mg/mL liquid, Take 500 mg by mouth every 4 hours as needed for fever or mild pain, Disp: , Rfl:      ADDERALL 20 MG tablet, TAKE 1 TABLET (20 MG) BY MOUTH DAILY, Disp: 30 tablet, Rfl: 0     albuterol (PROAIR HFA/PROVENTIL HFA/VENTOLIN HFA) 108 (90 Base) MCG/ACT inhaler, Inhale 2 puffs into the lungs every 4 hours as needed for shortness of breath / dyspnea or wheezing, Disp: 1 Inhaler, Rfl: 1     blood glucose (NO BRAND SPECIFIED) lancets standard, Use to test blood sugar 1 times daily or as directed., Disp: 100 each, Rfl: 1     blood glucose (NO BRAND SPECIFIED) test strip, Use to test blood sugar 1 times daily or as directed., Disp: 100 strip, Rfl: 3     blood glucose monitoring (NO BRAND SPECIFIED) meter device kit, Use to test blood sugar 1 times daily or as directed., Disp: 1 kit, Rfl: 0     carvedilol (COREG) 6.25 MG tablet, Take 6.25 mg by mouth 2 times daily (with meals), Disp: , Rfl:      cyanocobalamin (CYANOCOBALAMIN) 1000 MCG/ML injection, Inject 1 mL (1,000 mcg) into the muscle every 30 days, Disp: 1 mL, Rfl: 11     [place] [facility] MANAGED PATIENT, Contact [facility] for patient specific medication information at [phone] on admission and discharge from the hospital.  Phones are answered 24 hours a day 7 days a week 365 days a year.  Providers - Choose \"CONTINUE HOME MED (no script)\" at discharge if patient treatment with home infusion will continue., Disp: , Rfl:      fentaNYL " "(DURAGESIC) 25 mcg/hr 72 hr patch, Place 1 patch onto the skin every 48 hours remove old patch.   May fill 11/5/19 and start 11/7/19., Disp: 15 patch, Rfl: 0     lidocaine (LIDODERM) 5 % Patch, Place 1-2 patches onto the skin every 24 hours Wear for 12 hours, remove for 12 hours.  OK to cut to better fit to size., Disp: 60 patch, Rfl: 6     lidocaine, alum & mag hydroxide-simethicone GI Cocktail, 30 ml daily as needed for mouth/stomach pain., Disp: 1 Bottle, Rfl: 1     LORazepam (ATIVAN) 1 MG tablet, TAKE 1-2 TABLETS (1MG TO 2MG) BY MOUTH AS NEEDED FOR ANXIETY WITH TPN AND MEDICATIONS, Disp: 50 tablet, Rfl: 0     LORazepam (ATIVAN) 1 MG tablet, 1-2 mg as needed for anxiety with TPN and medications, Disp: 50 tablet, Rfl: 0     Needle, Disp, (BD DISP NEEDLES) 27G X 1/2\" MISC, 1 Device every 30 days Use for cyanocobalamin injection once q 30 days., Disp: 3 each, Rfl: 4     ondansetron (ZOFRAN-ODT) 8 MG ODT tab, DISSOLVE ONE TABLET ON TONGUE EVERY 8 HOURS AS NEEDED FOR NAUSEA, Disp: 90 tablet, Rfl: 1     order for DME, Equipment being ordered: Urine Drainage bag, leg., Disp: 15 Units, Rfl: 11     order for DME, Equipment being ordered: Bilateral knee high chronic venous insufficiency stockings--  mild-moderate pressures., Disp: 4 each, Rfl: 5     oxyCODONE (ROXICODONE) 5 MG/5ML solution, Take 10 mLs (10 mg) by mouth daily as needed for pain . 30 day supply. May fill on 10/14/19 to start on 10/15/19, Disp: 300 mL, Rfl: 0     pantoprazole (PROTONIX) 40 MG EC tablet, Take 1 tablet (40 mg) by mouth daily, Disp: 30 tablet, Rfl: 3     polyethylene glycol (MIRALAX/GLYCOLAX) powder, Take 17 g (1 capful) by mouth daily, Disp: 578 g, Rfl: 11     sodium chloride 0.9% infusion, Inject 1,000-2,000 mLs into the vein as needed , Disp: , Rfl:      sucralfate (CARAFATE) 1 GM tablet, Take 1 tablet (1 g) by mouth 4 times daily, Disp: 120 tablet, Rfl: 3     UNABLE TO FIND, States takes TPN 2050 ml  Every 14 hours through PICC, Disp: , Rfl: "      vitamin D2 (ERGOCALCIFEROL) 37632 units (1250 mcg) capsule, Take 1 capsule (50,000 Units) by mouth once a week, Disp: 12 capsule, Rfl: 3     naloxone (NARCAN) 4 MG/0.1ML nasal spray, Spray 4 mg into one nostril alternating nostrils once as needed every 2-3 minutes until assistance arrives, Disp: , Rfl:      ALLERGIES:    Allergies   Allergen Reactions     Bactrim [Sulfamethoxazole W/Trimethoprim] Rash     Penicillins Anaphylaxis     Please see Antimicrobial Management Team allergy assessment note 10/10/2018. Patient reported tolerating amoxicillin.     Doxycycline Rash     Vancomycin Rash     Rash after receiving vancomycin 3/28/16 (red man's?). Tolerated with slower infusion and diphenhydramine premed.        SOCIAL HISTORY:   Social History     Socioeconomic History     Marital status:      Spouse name: Rose     Number of children: 1     Years of education: Not on file     Highest education level: Not on file   Occupational History     Not on file   Social Needs     Financial resource strain: Not on file     Food insecurity:     Worry: Not on file     Inability: Not on file     Transportation needs:     Medical: Not on file     Non-medical: Not on file   Tobacco Use     Smoking status: Current Some Day Smoker     Packs/day: 0.25     Years: 3.00     Pack years: 0.75     Types: Cigarettes     Last attempt to quit: 2018     Years since quittin.2     Smokeless tobacco: Never Used     Tobacco comment: I use an e cig every now and than   Substance and Sexual Activity     Alcohol use: No     Comment: quit      Drug use: No     Sexual activity: Yes     Partners: Female     Birth control/protection: None     Comment: no protection   Lifestyle     Physical activity:     Days per week: Not on file     Minutes per session: Not on file     Stress: Not on file   Relationships     Social connections:     Talks on phone: Not on file     Gets together: Not on file     Attends Anabaptist service: Not on  "file     Active member of club or organization: Not on file     Attends meetings of clubs or organizations: Not on file     Relationship status: Not on file     Intimate partner violence:     Fear of current or ex partner: Not on file     Emotionally abused: Not on file     Physically abused: Not on file     Forced sexual activity: Not on file   Other Topics Concern     Parent/sibling w/ CABG, MI or angioplasty before 65F 55M? No   Social History Narrative     Not on file        FAMILY HISTORY:   Family History   Problem Relation Age of Onset     Gastrointestinal Disease Mother         Crohns disease     Anxiety Disorder Mother      Thyroid Disease Mother         Grave's disease     Cancer Father         ear cancer-skin cancer/melanoma     Breast Cancer Maternal Grandmother      Macular Degeneration Maternal Grandfather      Anxiety Disorder Sister      Diabetes Maternal Uncle      Breast Cancer Other      Hypertension No family hx of      Hyperlipidemia No family hx of      Cerebrovascular Disease No family hx of      Prostate Cancer No family hx of      Depression No family hx of      Anesthesia Reaction No family hx of      Asthma No family hx of      Osteoporosis No family hx of      Genetic Disorder No family hx of      Obesity No family hx of      Mental Illness No family hx of      Substance Abuse No family hx of      Glaucoma No family hx of         EXAM:Vitals: /82 (BP Location: Left arm, Cuff Size: Adult Regular)   Ht 1.803 m (5' 11\")   Wt 87.1 kg (192 lb)   BMI 26.78 kg/m    BMI= Body mass index is 26.78 kg/m .    General appearance: Patient is alert and fully cooperative with history & exam.  No sign of distress is noted during the visit.     Psychiatric: Affect is pleasant & appropriate.  Patient appears motivated to improve health.     Respiratory: Breathing is regular & unlabored while sitting.     HEENT: Hearing is intact to spoken word.  Speech is clear.  No gross evidence of visual " impairment that would impact ambulation.     Vascular: DP & PT pulses are intact & regular bilaterally.  No significant edema or varicosities noted.  CFT and skin temperature is normal to both lower extremities.     Neurologic: Lower extremity sensation is intact to light touch.  No evidence of weakness or contracture in the lower extremities.  No evidence of neuropathy.    Dermatologic: Skin is intact to both lower extremities with adequate texture, turgor and tone about the integument.  No paronychia or evidence of soft tissue infection is noted.     Musculoskeletal: Patient is ambulatory without assistive device or brace.  Generalized valgus foot deformity noted bilateral there is crepitus and subluxation or hypomobility throughout the first second metatarsal cuneiform joint.  Reproduction of pain is noted with this maneuver.  No bony hypertrophy noted.    Radiographs: Moderate to severe pes valgus deformity noted bilateral with diminished calcaneal inclination angle increased talar declination angle.     ASSESSMENT:       ICD-10-CM    1. Pes valgus Q66.6 ORTHOTICS REFERRAL   2. Synovitis of ankle M65.9         PLAN:  Reviewed patient's chart in [facility].      11/6/2019   Patient does have instability about the medial column with quite significant pes valgus.  Recommend custom molded orthotics and more sturdy shoe gear.  Current shoe gear is very flexible and crushed broken down likely years ago.  Written instructions were dispensed for more supportive shoe gear and order for custom molded orthotics dispensed.  Follow-up after utilizing the orthotics and more sturdy shoe gear.      Skinny Friend DPM

## 2019-11-06 NOTE — PATIENT INSTRUCTIONS
Reliable shoe stores: To maximize your experience and provide the best possible fit.  Be sure to show them your foot concerns and tell them Dr. Friend sent you.      Stores listed in bold have only athletic shoes, and stores that are not bold are mostly casual or variety of shoes    [employer]  [address] [place] [address]  [phone]    [facility] [employer] [facility] [place]  [address] [place] [address]  [phone]     [employer] Earlene Haines  [address] [place] [address]  [phone]    [employer]  [address] [facility] [address]  [phone]    [employer]  [address] [place], [place] [address] [phone]    [employer]  [address] [place] [address]  [phone]                         [employer] Locations:     [address] [place] [address]   [phone]     [address] [place] [address] [address] [place] [address]   [phone]     [address] [place] [address]   [phone]     [address] [facility] [place] [address]   [phone]     [address] [place] [address]   [phone]     [address] [place] [address]   [phone]     [address] [facility] [address]   [place] [address]   [phone]     [address] [facility]   [place] [address]   [phone]             How to find reasonable shoes          The correct width    Correct Fitting    Correct Length      Foot Distortion    Posture Distortion                          Torsional Rigidity      Grasp behind the heel and underneath the foot and twist      Bad    Excessive torsion/twist in midfoot     Less torsion/twist in midfoot is better                   Heel Counter Rigidity      Grasp just above   midsole and squeeze      Bad    Soft heel counter      Good    Rigid Heel Counter      Flexion Rigidity      Grasp shoe and bend from forefoot to rearfoot              Reliable shoe stores: To maximize your experience  and provide the best possible fit.  Be sure to show them your foot concerns and tell them Dr. Friend sent you.      Stores listed in bold have only athletic shoes, and stores that are not bold are mostly casual or variety of shoes    [employer]  [address] [place] [address]  [phone]    [facility] [employer] [facility] [place]  [address] [place] [address]  [phone]    [employer] [facility] [place] [place]  [address] [place] [address]  [phone]    [employer]  [address]  [facility] [address]  [phone]    [employer]  [address] [place], [place] [address] [phone]    [employer]  [address] [place] [address]  [phone]                         [employer] Locations:     [address] [place] [address]   [phone]     [address] [place] [address] [address] [place] [address]   [phone]     [address] [place] [address]   [phone]     [address] [facility] [place] [place] [address]   [phone]     [address] [place] [address]   [phone]     [address] [place] [address]   [phone]     [address] [facility] [address]   [place] [address]   [phone]     [address] [facility]   [place] [address]   [phone]             How to find reasonable shoes          The correct width    Correct Fitting    Correct Length      Foot Distortion    Posture Distortion                          Torsional Rigidity      Grasp behind the heel and underneath the foot and twist      Bad    Excessive torsion/twist in midfoot     Less torsion/twist in midfoot is better                   Heel Counter Rigidity      Grasp just above   midsole and squeeze      Bad    Soft heel counter      Good    Rigid Heel Counter      Flexion Rigidity      Grasp shoe and bend from forefoot to rearfoot

## 2019-11-07 LAB
BACTERIA SPEC CULT: ABNORMAL
Lab: ABNORMAL
SPECIMEN SOURCE: ABNORMAL

## 2019-11-08 ENCOUNTER — HEALTH MAINTENANCE LETTER (OUTPATIENT)
Age: 49
End: 2019-11-08

## 2019-11-08 ENCOUNTER — HOME INFUSION (PRE-WILLOW HOME INFUSION) (OUTPATIENT)
Dept: PHARMACY | Facility: CLINIC | Age: 49
End: 2019-11-08

## 2019-11-08 NOTE — DISCHARGE SUMMARY
[facility] [place]  Hospitalist Discharge Summary       Date of Admission:  11/2/2019  Date of Discharge:  11/4/2019  1:39 PM  Discharging Provider: Francis Torres MD  Discharge Team: Hospitalist Service, Gold 9    Discharge Diagnoses    # Positive culture from PICC  # Recurrent CLABSI  # chronic pain   #Chronic Severe Malnutrition on TPN   #RnYGB.   #Short Gut Syndrome  # Hx of systolic heart failure   # ADHD    Follow-ups Needed After Discharge   Follow-up Appointments     Adult [facility] Follow-up and recommended labs and tests      Follow up with primary care provider, Chuy Isaac, within 7 days for   hospital follow- up.  No follow up labs or test are needed.    Follow up with your infectious disease team       Appointments on [place] and/or [facility] (with [facility] or [facility]   provider or service). Call [phone] if you haven't heard regarding   these appointments within 7 days of discharge.             Unresulted Labs Ordered in the Past 30 Days of this Admission     No orders found from 10/3/2019 to 11/3/2019.      These results will be followed up by Ordering physician     Discharge Disposition   Discharged to home  Condition at discharge: Stable    Hospital Course   Parker Acevedo is a 48 year old man with a history of RnYGB and short gut syndrome w/ severe malnutrition requiring TPN c/b recurrent CLABSIs most recently admitted to [facility] 9/29-10/4/19 w/ granulicatella, staph epi, and strep salivarius CLABSI. Also w/ history of chronic pain, ADHD, tobacco abuse, and NICM who was admitted for a positive blood culture from PICC obtained in clinic. On admission he was afebrile and HDS. ID was consulted and recommended against abx, repeating blood culture and monitoring. The original cultures grew Corynebacterium and peptostrepotococcus which were deemed contaminant. Peripheral cultures were negative and repeated PICC cultures remained negative. PICC line was used without  "any fever and patient discharged to home to follow up in the ID clinic.      Consultations This Hospital Stay   INFECTIOUS DISEASE GENERAL ADULT IP CONSULT  PHARMACY/NUTRITION TO START AND MANAGE TPN    Code Status   Full Code    Time Spent on this Encounter   I, Francis Torres MD, personally saw the patient today and spent less than or equal to 30 minutes discharging this patient.       Francis Torres MD  [facility], [place]  ______________________________________________________________________    Physical Exam   /81 (BP Location: Right arm)   Pulse 81   Temp 98.2  F (36.8  C) (Oral)   Resp 15   Ht 1.803 m (5' 11\")   Wt 87.8 kg (193 lb 8 oz)   SpO2 100%   BMI 26.99 kg/m    Constitutional:Awake, alert, cooperative, no apparent distress  Respiratory: Clear to auscultation bilaterally, no crackles or wheezing  Cardiovascular: Regular rate and rhythm, normal S1 and S2, and no murmur noted  GI: Normal bowel sounds, soft, non-distended, non-tender  Skin/Integumen: No rashes, no cyanosis, no edema       Primary Care Physician   Chuy Isaac    Discharge Orders      Home infusion referral      Reason for your hospital stay    You were hospitalized for concern for PICC infection, however, given the type of bacteria cultures and ID input it is determined that it is most likely contamination.     Adult [facility] Follow-up and recommended labs and tests    Follow up with primary care provider, Chuy Isaac, within 7 days for hospital follow- up.  No follow up labs or test are needed.    Follow up with your infectious disease team       Appointments on [place] and/or [facility] (with [facility] or [facility] provider or service). Call [phone] if you haven't heard regarding these appointments within 7 days of discharge.     Activity    Your activity upon discharge: activity as tolerated     Full Code     Diet    Follow this diet upon discharge: Orders Placed This Encounter      " Regular Diet Adult, TPN       Significant Results and Procedures   Most Recent 3 CBC's:  Recent Labs   Lab Test 11/02/19  1853 10/30/19  1330 10/22/19  1120   WBC 10.4 9.5 Canceled, Test credited   HGB 13.3 13.3 Canceled, Test credited   MCV 96 95 Canceled, Test credited    185 Canceled, Test credited     Most Recent 3 BMP's:  Recent Labs   Lab Test 11/03/19  0808 11/02/19  1853 10/30/19  1330    140 142   POTASSIUM 3.9 3.9 3.5   CHLORIDE 107 108 109   CO2 28 28 23   BUN 13 12 13   CR 0.76 0.67 0.88   ANIONGAP 5 4 10   SILAS 8.4* 8.5 8.2*   * 103* 76     Most Recent 2 LFT's:  Recent Labs   Lab Test 10/30/19  1330 10/29/19  1210   AST 11 11   ALT 17 20   ALKPHOS 95 92   BILITOTAL 0.4 0.3   ,   Results for orders placed or performed during the hospital encounter of 11/02/19   XR Chest 2 Views    Narrative    EXAM: XR CHEST 2 VW  11/2/2019 7:32 PM     HISTORY:  Cough, fever       COMPARISON:  10/2/2019    FINDINGS:   Right PICC tip projects at the cavoatrial junction.    The trachea is midline. The cardiomediastinal silhouette is well  defined. The pulmonary vasculature are distinct.     Mild wedge deformities of the lower thoracic vertebra, otherwise the  included osseous thorax, soft tissues and upper abdomen and are within  normal limits.     Lungs are clear without focal airspace opacity, pleural effusion or  pneumothorax.      Impression    IMPRESSION: No acute airspace opacity.     I have personally reviewed the examination and initial interpretation  and I agree with the findings.    TIANNA FARMER MD     *Note: Due to a large number of results and/or encounters for the requested time period, some results have not been displayed. A complete set of results can be found in Results Review.       Discharge Medications   Discharge Medication List as of 11/4/2019  1:21 PM      CONTINUE these medications which have NOT CHANGED    Details   acetaminophen (TYLENOL) 32 mg/mL liquid Take 500 mg by mouth  "every 4 hours as needed for fever or mild pain, Historical      albuterol (PROAIR HFA/PROVENTIL HFA/VENTOLIN HFA) 108 (90 Base) MCG/ACT inhaler Inhale 2 puffs into the lungs every 4 hours as needed for shortness of breath / dyspnea or wheezing, Disp-1 Inhaler, R-1, E-PrescribePharmacy may dispense brand covered by insurance (Proair, or proventil or ventolin or generic albuterol inhaler)      blood glucose (NO BRAND SPECIFIED) lancets standard Use to test blood sugar 1 times daily or as directed.Disp-100 each, [id number]      blood glucose (NO BRAND SPECIFIED) test strip Use to test blood sugar 1 times daily or as directed., Disp-100 strip, R-3, E-Prescribe      blood glucose monitoring (NO BRAND SPECIFIED) meter device kit Use to test blood sugar 1 times daily or as directed.Disp-1 kit, [id number]      carvedilol (COREG) 6.25 MG tablet Take 6.25 mg by mouth 2 times daily (with meals), Historical      cyanocobalamin (CYANOCOBALAMIN) 1000 MCG/ML injection Inject 1 mL (1,000 mcg) into the muscle every 30 days, Disp-1 mL, R-11, E-Prescribe      [place] [facility] MANAGED PATIENT Contact [facility] for patient specific medication information at [phone] on admission and discharge from the hospital.  Phones are answered 24 hours a day 7 days a week 365 days a year.    Providers - Choose \"CONTINUE HOME MED (no scr ipt)\" at discharge if patient treatment with home infusion will continue., No Print OutResume prior to admission TPN/Lipids/saline      fentaNYL (DURAGESIC) 25 mcg/hr 72 hr patch Place 1 patch onto the skin every 48 hours remove old patch.   May fill 11/5/19 and start 11/7/19., Disp-15 patch, R-0, E-Prescribe      lidocaine (LIDODERM) 5 % Patch Place 1-2 patches onto the skin every 24 hours Wear for 12 hours, remove for 12 hours.  OK to cut to better fit to size.Disp-60 patch, [id number]      lidocaine, alum & mag hydroxide-simethicone GI Cocktail 30 ml daily as needed for " "mouth/stomach pain., Disp-1 Bottle, R-1, Local Print      LORazepam (ATIVAN) 1 MG tablet 1-2 mg as needed for anxiety with TPN and medications, Disp-50 tablet, R-0, Local Print      naloxone (NARCAN) 4 MG/0.1ML nasal spray Spray 4 mg into one nostril alternating nostrils once as needed every 2-3 minutes until assistance arrives, Historical      Needle, Disp, (BD DISP NEEDLES) 27G X 1/2\" MISC 1 Device every 30 days Use for cyanocobalamin injection once q 30 days., Disp-3 each, R-4, E-Prescribe      ondansetron (ZOFRAN-ODT) 8 MG ODT tab DISSOLVE ONE TABLET ON TONGUE EVERY 8 HOURS AS NEEDED FOR NAUSEA, Disp-90 tablet, R-1, E-Prescribe      !! order for DME Equipment being ordered: Urine Drainage bag, leg.Disp-15 Units, R-11, Local Print      !! order for DME Equipment being ordered: Bilateral knee high chronic venous insufficiency stockings--  mild-moderate pressures.Disp-4 each, R-5, Local Print      oxyCODONE (ROXICODONE) 5 MG/5ML solution Take 10 mLs (10 mg) by mouth daily as needed for pain . 30 day supply. May fill on 10/14/19 to start on 10/15/19, Disp-300 mL, R-0, E-Prescribe      pantoprazole (PROTONIX) 40 MG EC tablet Take 1 tablet (40 mg) by mouth daily, Disp-30 tablet, R-3, E-Prescribe      polyethylene glycol (MIRALAX/GLYCOLAX) powder Take 17 g (1 capful) by mouth daily, Disp-578 g, R-11, E-Prescribe      sodium chloride 0.9% infusion Inject 1,000-2,000 mLs into the vein as needed , Historical      sucralfate (CARAFATE) 1 GM tablet Take 1 tablet (1 g) by mouth 4 times daily, Disp-120 tablet, R-3, E-Prescribe      UNABLE TO FIND States takes TPN 2050 ml  Every 14 hours through PICC, Historical      vitamin D2 (ERGOCALCIFEROL) 18016 units (1250 mcg) capsule Take 1 capsule (50,000 Units) by mouth once a week, Disp-12 capsule, R-3, E-Prescribe      amphetamine-dextroamphetamine (ADDERALL) 20 MG tablet Take 1 tablet (20 mg) by mouth daily, Disp-30 tablet, R-0, Local Print       !! - Potential duplicate " medications found. Please discuss with provider.      STOP taking these medications       diphenhydrAMINE (BENADRYL) 12.5 MG/5ML solution Comments:   Reason for Stopping:             Allergies   Allergies   Allergen Reactions     Bactrim [Sulfamethoxazole W/Trimethoprim] Rash     Penicillins Anaphylaxis     Please see Antimicrobial Management Team allergy assessment note 10/10/2018. Patient reported tolerating amoxicillin.     Doxycycline Rash     Vancomycin Rash     Rash after receiving vancomycin 3/28/16 (red man's?). Tolerated with slower infusion and diphenhydramine premed.

## 2019-11-08 NOTE — PROGRESS NOTES
[facility]    Date of visit: 11/13/2019     Chief complaint:    Chief Complaint   Patient presents with     Pain       Interval history:  Parker Acevedo was last seen by me on 9/11/2019     Recommendations/plan at the last visit included:  1. Physical therapy- order previously placed.  Didn't discuss during appointment, but sent Mychart about sending in new referral to do this.  2. Medications  1. See above discussion  2. Continue fentanyl 25mcg/hr.  Allowing 10mg oxycodone/day.  3. Follow up in 2-3 months.  4. Parker to follow up with Primary Care provider regarding elevated blood pressure. This is only small elevation    Since his last visit, Parker Acevedo reports:  -he saw podiatrist for his feet pain. He relates that the joints and tendons in his feet and ankles are deteriorating. He has pes valgus and was recommended custom molded orthotics and more sturdy shoe gears.  -he was hospitalized on 11/02/2019. He was admitted in the hospital for positive blood culture that was drawn from his PICC line on 10/30/2019. He notes to having fevers reaching 106 during his stay at the hospital.  -he's been cutting back on smoking.  -he notes bladder incontinence and relates that it is something uncommon for him. He was not recommended for a catheter- PCP is monitoring him.   -his wife manages his medications.    Pain scores:  Average pain intensity  6/10    Current pain treatments:   Fentanyl 25mcg/hr patch q48 hours -  Previously was at 50mcg/hr  Lidocaine patches- as needed  Naloxone- has from previous hospitalization.  Oxycodone max of 15 mg a day.  Previous medication treatments included:   Valium 5 mg/day, 1 tab in AM and PM-stopped in 11/2017  Tylenol #3   Vicodin   Tramadol   Diazepam- for sleep/anxiety  Gabapentin- bad headaches, acid reflux  Oxycodone max of 45mg/day.        Side Effects: no side effects    Medications:  Current Outpatient Medications  "  Medication Sig Dispense Refill     acetaminophen (TYLENOL) 32 mg/mL liquid Take 500 mg by mouth every 4 hours as needed for fever or mild pain       albuterol (PROAIR HFA/PROVENTIL HFA/VENTOLIN HFA) 108 (90 Base) MCG/ACT inhaler Inhale 2 puffs into the lungs every 4 hours as needed for shortness of breath / dyspnea or wheezing 1 Inhaler 1     blood glucose (NO BRAND SPECIFIED) lancets standard Use to test blood sugar 1 times daily or as directed. 100 each 1     blood glucose (NO BRAND SPECIFIED) test strip Use to test blood sugar 1 times daily or as directed. 100 strip 3     blood glucose monitoring (NO BRAND SPECIFIED) meter device kit Use to test blood sugar 1 times daily or as directed. 1 kit 0     carvedilol (COREG) 6.25 MG tablet Take 6.25 mg by mouth 2 times daily (with meals)       cyanocobalamin (CYANOCOBALAMIN) 1000 MCG/ML injection Inject 1 mL (1,000 mcg) into the muscle every 30 days 1 mL 11     [place] [facility] MANAGED PATIENT Contact [facility] for patient specific medication information at [phone] on admission and discharge from the hospital.  Phones are answered 24 hours a day 7 days a week 365 days a year.    Providers - Choose \"CONTINUE HOME MED (no script)\" at discharge if patient treatment with home infusion will continue.       lidocaine (LIDODERM) 5 % Patch Place 1-2 patches onto the skin every 24 hours Wear for 12 hours, remove for 12 hours.  OK to cut to better fit to size. (Patient not taking: Reported on 11/25/2019) 60 patch 6     naloxone (NARCAN) 4 MG/0.1ML nasal spray Spray 4 mg into one nostril alternating nostrils once as needed every 2-3 minutes until assistance arrives       Needle, Disp, (BD DISP NEEDLES) 27G X 1/2\" MISC 1 Device every 30 days Use for cyanocobalamin injection once q 30 days. 3 each 4     order for DME Equipment being ordered: Urine Drainage bag, leg. 15 Units 11     order for DME Equipment being ordered: Bilateral knee high chronic venous " insufficiency stockings--  mild-moderate pressures. 4 each 5     pantoprazole (PROTONIX) 40 MG EC tablet Take 1 tablet (40 mg) by mouth daily 30 tablet 3     polyethylene glycol (MIRALAX/GLYCOLAX) powder Take 17 g (1 capful) by mouth daily 578 g 11     sodium chloride 0.9% infusion Inject 1,000-2,000 mLs into the vein as needed        sucralfate (CARAFATE) 1 GM tablet Take 1 tablet (1 g) by mouth 4 times daily 120 tablet 3     UNABLE TO FIND States takes TPN 2050 ml  Every 14 hours through PICC       vitamin D2 (ERGOCALCIFEROL) 92820 units (1250 mcg) capsule Take 1 capsule (50,000 Units) by mouth once a week 12 capsule 3     amphetamine-dextroamphetamine (ADDERALL) 20 MG tablet Take 1 tablet (20 mg) by mouth daily 30 tablet 0     fentaNYL (DURAGESIC) 25 mcg/hr 72 hr patch Place 1 patch onto the skin every 48 hours remove old patch.   May fill 1/3/20 and start 1/6/20 15 patch 0     lidocaine, alum & mag hydroxide-simethicone GI Cocktail 30 ml daily as needed for mouth/stomach pain. 1 Bottle 1     LORazepam (ATIVAN) 1 MG tablet TAKE 1mg BY MOUTH AS NEEDED FOR ANXIETY WITH TPN AND MEDICATIONS-just once a day, 30 to last 30 days. 30 tablet 0     ondansetron (ZOFRAN-ODT) 8 MG ODT tab DISSOLVE ONE TABLET ON TONGUE EVERY 8 HOURS AS NEEDED FOR NAUSEA 90 tablet 1     oxyCODONE (ROXICODONE) 5 MG/5ML solution Take 5-10 mLs (5-10 mg) by mouth 2 times daily as needed for pain Max of 15mg/day. 30 day supply. May fill on 1/12/20 to start on 1/13/20 450 mL 0       Medical History: any changes in medical history since they were last seen? pickline removed, jolly placed    Review of Systems:  The 14 system ROS was reviewed from the intake questionnaire, and is positive for: Constitutional: fever/chills, fatigue, weight gain, weight loss  Eyes/Head: headache, dizziness  ENT: ringing in ears  Allergy/Immune: allergies  Skin: itching, rash, hives  Hematologic: easy bruising  Respiratory: cough, wheezing, shortness of  breath  Cardiovascular: swelling in feet, fainting, palpitations, chest pain  GI: abdominal pain, nausea, vomiting, diarrhea, constipation  Endocrine: steroid use  Musculoskeletal:  joint pain, arthritis, stiffness, gout, back pain, neck pain  Urinary: frequency, urgency, incontinence, hesitancy  Neurologic: weakness, numbness/tingling, seizure, stroke, memory loss  Mental health: depression, anxiety, stress, suicidal ideation    This document serves as a record of the services and decisions personally performed and made by Jessica Milligan MD. It was created on her behalf by Paulina Quintanilla, a trained medical scribe. The creation of this document is based on the provider's statements to the medical scribe.  Paulina Quintanilla 1:58 PM November 13, 2019    Physical Exam:  Blood pressure (!) 133/91, pulse 76, weight 89.8 kg (198 lb).  General: awake, alert.  Gait:  MSK exam: exam deferred.      THE 4 A's OF OPIOID MAINTENANCE ANALGESIA     Analgesia: on fentanyl alone, not getting enough coverage    Activity: on disability    Adverse effects: none    Adherence to Rx protocol: good- [place] Prescription Monitoring Program checked 11/13/19 appropriate  Last UDS and opioid agreement - 9/11/2019    Assessment:   1. Chronic abdominal pain, with history of gastric bypass and later peptic ulcer   2. G tube placement- only used for venting  3. Myofascial abdominal pain, with significant guarding and poor posture  4. Opioid tolerance  5. Anxiety  6. Foot pain with tendonous injury- healed  7. Left arm weakness, consistent with radial nerve injury- improving  8. Port placement- h/o recurrent infections, getting TPN        Plan:   1. Physical therapy- discussed continuing this   2. Allowing 5 mg oxycodone/day- max dose 15mg.  3. Follow up in 2-3 months.  4. Parker to follow up with Primary Care provider regarding bladder incontinence.  Parker to follow up with Primary Care provider regarding elevated blood pressure. Only mildly elevated    Total time  spent was 30 minutes, and more than 50% of face to face time was spent in counseling and/or coordination of care regarding physical therapy, medications.     The information in this document, created by the medical scribe for me, accurately reflects the services I personally performed and the decisions made by me. I have reviewed and approved this document for accuracy prior to leaving the patient care area.  November 13, 2019 2:03 PM    Jessica Milligan MD  [facility]

## 2019-11-11 ENCOUNTER — MEDICAL CORRESPONDENCE (OUTPATIENT)
Dept: HEALTH INFORMATION MANAGEMENT | Facility: CLINIC | Age: 49
End: 2019-11-11

## 2019-11-11 ENCOUNTER — HOSPITAL ENCOUNTER (OUTPATIENT)
Dept: LAB | Facility: CLINIC | Age: 49
Discharge: HOME | End: 2019-11-11
Attending: SURGERY | Admitting: SURGERY
Payer: COMMERCIAL

## 2019-11-11 LAB
ALBUMIN SERPL-MCNC: 3 G/DL (ref 3.4–5)
ALP SERPL-CCNC: 77 U/L (ref 40–150)
ALT SERPL W P-5'-P-CCNC: 16 U/L (ref 0–70)
ANION GAP SERPL CALCULATED.3IONS-SCNC: 5 MMOL/L (ref 3–14)
AST SERPL W P-5'-P-CCNC: 11 U/L (ref 0–45)
BASOPHILS # BLD AUTO: 0.1 10E9/L (ref 0–0.2)
BASOPHILS NFR BLD AUTO: 0.8 %
BILIRUB DIRECT SERPL-MCNC: <0.1 MG/DL (ref 0–0.2)
BILIRUB SERPL-MCNC: 0.3 MG/DL (ref 0.2–1.3)
BUN SERPL-MCNC: 12 MG/DL (ref 7–30)
CALCIUM SERPL-MCNC: 7.8 MG/DL (ref 8.5–10.1)
CHLORIDE SERPL-SCNC: 108 MMOL/L (ref 94–109)
CO2 SERPL-SCNC: 29 MMOL/L (ref 20–32)
CREAT SERPL-MCNC: 0.73 MG/DL (ref 0.66–1.25)
DIFFERENTIAL METHOD BLD: ABNORMAL
EOSINOPHIL NFR BLD AUTO: 3.4 %
ERYTHROCYTE [DISTWIDTH] IN BLOOD BY AUTOMATED COUNT: 14.3 % (ref 10–15)
GFR SERPL CREATININE-BSD FRML MDRD: >90 ML/MIN/{1.73_M2}
GLUCOSE SERPL-MCNC: 130 MG/DL (ref 70–99)
HCT VFR BLD AUTO: 35.5 % (ref 40–53)
HGB BLD-MCNC: 11.5 G/DL (ref 13.3–17.7)
IMM GRANULOCYTES # BLD: 0 10E9/L (ref 0–0.4)
IMM GRANULOCYTES NFR BLD: 0.3 %
LYMPHOCYTES # BLD AUTO: 1.5 10E9/L (ref 0.8–5.3)
LYMPHOCYTES NFR BLD AUTO: 22.9 %
MAGNESIUM SERPL-MCNC: 1.6 MG/DL (ref 1.6–2.3)
MCH RBC QN AUTO: 31.4 PG (ref 26.5–33)
MCHC RBC AUTO-ENTMCNC: 32.4 G/DL (ref 31.5–36.5)
MCV RBC AUTO: 97 FL (ref 78–100)
MONOCYTES # BLD AUTO: 0.7 10E9/L (ref 0–1.3)
MONOCYTES NFR BLD AUTO: 10.6 %
NEUTROPHILS # BLD AUTO: 4 10E9/L (ref 1.6–8.3)
NEUTROPHILS NFR BLD AUTO: 62 %
NRBC # BLD AUTO: 0 10*3/UL
NRBC BLD AUTO-RTO: 0 /100
PHOSPHATE SERPL-MCNC: 2.8 MG/DL (ref 2.5–4.5)
PLATELET # BLD AUTO: 97 10E9/L (ref 150–450)
POTASSIUM SERPL-SCNC: 3.7 MMOL/L (ref 3.4–5.3)
PROT SERPL-MCNC: 6 G/DL (ref 6.8–8.8)
RBC # BLD AUTO: 3.66 10E12/L (ref 4.4–5.9)
SODIUM SERPL-SCNC: 142 MMOL/L (ref 133–144)
TRIGL SERPL-MCNC: 130 MG/DL
WBC # BLD AUTO: 6.4 10E9/L (ref 4–11)

## 2019-11-11 PROCEDURE — 84478 ASSAY OF TRIGLYCERIDES: CPT | Performed by: SURGERY

## 2019-11-11 PROCEDURE — 83735 ASSAY OF MAGNESIUM: CPT | Performed by: SURGERY

## 2019-11-11 PROCEDURE — 83785 ASSAY OF MANGANESE: CPT | Performed by: SURGERY

## 2019-11-11 PROCEDURE — 82248 BILIRUBIN DIRECT: CPT | Performed by: SURGERY

## 2019-11-11 PROCEDURE — 80053 COMPREHEN METABOLIC PANEL: CPT | Performed by: SURGERY

## 2019-11-11 PROCEDURE — 85025 COMPLETE CBC W/AUTO DIFF WBC: CPT | Performed by: SURGERY

## 2019-11-11 PROCEDURE — 84100 ASSAY OF PHOSPHORUS: CPT | Performed by: SURGERY

## 2019-11-11 NOTE — PROGRESS NOTES
This is a recent snapshot of the patient's [facility] medical record.  For current drug dose and complete information and questions, call [phone] or In Basket pool, [facility] home infusion ([id number])  CSN Number:  [id number]

## 2019-11-12 ENCOUNTER — HOME INFUSION (PRE-WILLOW HOME INFUSION) (OUTPATIENT)
Dept: PHARMACY | Facility: CLINIC | Age: 49
End: 2019-11-12

## 2019-11-12 ENCOUNTER — TELEPHONE (OUTPATIENT)
Dept: PODIATRY | Facility: CLINIC | Age: 49
End: 2019-11-12

## 2019-11-12 NOTE — TELEPHONE ENCOUNTER
Pt calling and is needing a PA done on the [employer] website for his orthotics. Please call Rose Canales's wife  at#[phone] with any questions. Thank You Teetee  Billing code # that you will need is # DX code Q66.6

## 2019-11-13 ENCOUNTER — OFFICE VISIT (OUTPATIENT)
Dept: PALLIATIVE MEDICINE | Facility: CLINIC | Age: 49
End: 2019-11-13
Payer: COMMERCIAL

## 2019-11-13 ENCOUNTER — MYC REFILL (OUTPATIENT)
Dept: INTERNAL MEDICINE | Facility: CLINIC | Age: 49
End: 2019-11-13

## 2019-11-13 VITALS
BODY MASS INDEX: 27.62 KG/M2 | SYSTOLIC BLOOD PRESSURE: 133 MMHG | WEIGHT: 198 LBS | DIASTOLIC BLOOD PRESSURE: 91 MMHG | HEART RATE: 76 BPM

## 2019-11-13 DIAGNOSIS — G89.4 CHRONIC PAIN SYNDROME: ICD-10-CM

## 2019-11-13 DIAGNOSIS — Z79.891 ENCOUNTER FOR LONG-TERM OPIATE ANALGESIC USE: ICD-10-CM

## 2019-11-13 DIAGNOSIS — G89.29 CHRONIC ABDOMINAL PAIN: Primary | ICD-10-CM

## 2019-11-13 DIAGNOSIS — Z98.84 GASTRIC BYPASS STATUS FOR OBESITY: ICD-10-CM

## 2019-11-13 DIAGNOSIS — E43 SEVERE MALNUTRITION (H): ICD-10-CM

## 2019-11-13 DIAGNOSIS — R10.9 CHRONIC ABDOMINAL PAIN: Primary | ICD-10-CM

## 2019-11-13 PROCEDURE — 99214 OFFICE O/P EST MOD 30 MIN: CPT | Performed by: PSYCHIATRY & NEUROLOGY

## 2019-11-13 RX ORDER — OXYCODONE HCL 5 MG/5 ML
5-10 SOLUTION, ORAL ORAL 2 TIMES DAILY PRN
Qty: 450 ML | Refills: 0 | Status: SHIPPED | OUTPATIENT
Start: 2019-11-13 | End: 2019-11-13

## 2019-11-13 RX ORDER — OXYCODONE HCL 5 MG/5 ML
5-10 SOLUTION, ORAL ORAL 2 TIMES DAILY PRN
Qty: 450 ML | Refills: 0 | Status: SHIPPED | OUTPATIENT
Start: 2019-11-13 | End: 2019-12-10

## 2019-11-13 ASSESSMENT — PAIN SCALES - GENERAL: PAINLEVEL: SEVERE PAIN (6)

## 2019-11-13 NOTE — PATIENT INSTRUCTIONS
1. Follow up in 3 months  2. You can increase your oxycodone to no more than 15mg/day.    ----------------------------------------------------------------  Clinic Number:  [phone]     Call with any questions about your care and for scheduling assistance.     Calls are returned Monday through Friday between 8 AM and 4:30 PM. We usually get back to you within 2 business days depending on the issue/request.    If we are prescribing your medications:    For opioid medication refills, call the clinic or send a [employer] message 7 days in advance.  Please include:    Name of requested medication    Name of the pharmacy.    For non-opioid medications, call your pharmacy directly to request a refill. Please allow 3-4 days to be processed.     Per [place] State Law:    All controlled substance prescriptions must be filled within 30 days of being written.      For those controlled substances allowing refills, pickup must occur within 30 days of last fill.      We believe regular attendance is key to your success in our program!      Any time you are unable to keep your appointment we ask that you call us at least 24 hours in advance to cancel.This will allow us to offer the appointment time to another patient.     Multiple missed appointments may lead to dismissal from the clinic.

## 2019-11-13 NOTE — TELEPHONE ENCOUNTER
MAKEN[facility] to contact RN for information to complete this encounter. Lorelei Mora CMA, November 13, 2019

## 2019-11-13 NOTE — PROGRESS NOTES
This is a recent snapshot of the patient's [facility] medical record.  For current drug dose and complete information and questions, call [phone] or In Basket pool, [facility]v home infusion ([id number])  CSN Number:  [id number]

## 2019-11-14 ENCOUNTER — MYC REFILL (OUTPATIENT)
Dept: CARE COORDINATION | Facility: CLINIC | Age: 49
End: 2019-11-14

## 2019-11-14 ENCOUNTER — MYC REFILL (OUTPATIENT)
Dept: FAMILY MEDICINE | Facility: CLINIC | Age: 49
End: 2019-11-14

## 2019-11-14 DIAGNOSIS — Z98.84 S/P BARIATRIC SURGERY: ICD-10-CM

## 2019-11-14 DIAGNOSIS — E53.8 VITAMIN B12 DEFICIENCY (NON ANEMIC): ICD-10-CM

## 2019-11-14 DIAGNOSIS — E55.9 VITAMIN D DEFICIENCY: ICD-10-CM

## 2019-11-14 LAB — MANGANESE BLD-MCNC: 9 UG/L (ref 4.2–16.5)

## 2019-11-14 RX ORDER — ERGOCALCIFEROL 1.25 MG/1
50000 CAPSULE, LIQUID FILLED ORAL WEEKLY
Qty: 12 CAPSULE | Refills: 3 | OUTPATIENT
Start: 2019-11-14

## 2019-11-14 RX ORDER — CYANOCOBALAMIN 1000 UG/ML
1 INJECTION, SOLUTION INTRAMUSCULAR; SUBCUTANEOUS
Qty: 1 ML | Refills: 11 | OUTPATIENT
Start: 2019-11-14

## 2019-11-14 RX ORDER — PANTOPRAZOLE SODIUM 40 MG/1
40 TABLET, DELAYED RELEASE ORAL DAILY
Qty: 30 TABLET | Refills: 3 | OUTPATIENT
Start: 2019-11-14

## 2019-11-14 NOTE — TELEPHONE ENCOUNTER
Lidocaine, alum & mag hydroxide-simethicone GI cocktail       Last Written Prescription Date:  10/11/19  Last Fill Quantity: 1 bottle,   # refills: 1  Last Office Visit: 8/19/19  Future Office visit:    Next 5 appointments (look out 90 days)    Nov 25, 2019  1:30 PM CST  Office Visit with Chuy Isaac MD  [facility] [address] [place] [id number]  [phone]   Feb 12, 2020  1:15 PM CST  Return Visit with Patti Milligan MD  [facility] ([facility]) [address] [facility] [id number] [phone]           Routing refill request to provider for review/approval because:  Drug not on the [facility], [facility] or  Health refill protocol or controlled substance

## 2019-11-14 NOTE — TELEPHONE ENCOUNTER
"Requested Prescriptions   Pending Prescriptions Disp Refills     vitamin D2 (ERGOCALCIFEROL) 84478 units (1250 mcg) capsule 12 capsule 3     Sig: Take 1 capsule (50,000 Units) by mouth once a week       There is no refill protocol information for this order   Denied  Refills current  Filed 8/5/2019 for 3 months and 3 refills           cyanocobalamin (CYANOCOBALAMIN) 1000 MCG/ML injection 1 mL 11     Sig: Inject 1 mL (1,000 mcg) into the muscle every 30 days   Last Written Prescription Date:  8/5/20119  Last Fill Quantity: 1 ml,  # refills: 11    Last office visit: 8/19/2019 with prescribing provider:     Future Office Visit:   Next 5 appointments (look out 90 days)    Nov 25, 2019  1:30 PM CST  [facility] [address] [place] [id number]  [phone]   Feb 12, 2020  1:15 PM CST  Return Visit with Patti Milligan MD  [facility] ([facility]) [address] [place] [address]  [phone]             Vitamin Supplements (Adult) Protocol Failed - 11/14/2019 11:44 AM        Failed - High dose Vitamin D not ordered        Failed - Recent (12 mo) or future (30 days) visit within the authorizing provider's specialty     Patient has had an office visit with the authorizing provider or a provider within the authorizing providers department within the previous 12 mos or has a future within next 30 days. See \"Patient Info\" tab in inbasket, or \"Choose Columns\" in Meds & Orders section of the refill encounter.              Passed - Medication is active on med list      Denied   Refill current  Ella Hammond RN    " [facility]  Cardiology  Progress Note    Patient Name: Griselda Neely  MRN: [id number]  Admission Date: 8/6/2019  Hospital Length of Stay: 7 days  Code Status: Full Code   Attending Physician: Amos Snyder MD   Primary Care Physician: Kalie Neely MD  Expected Discharge Date:   Principal Problem:Encephalopathy, toxic    Subjective:     Patient seen around 840 AM today.     Interval History: Still appears to be confused. Keeps moaning and does not answer questions appropriately. Cardizem dip has reportedly been stopped due to low heart rate and now she is tachycardic.    ROS   Not obtainable.    Cardiology:  Atrial fibrillation, RVR  Objective:     Vital Signs (Most Recent):  Temp: 97 °F (36.1 °C) (08/13/19 1200)  Pulse: 84 (08/13/19 1600)  Resp: 15 (08/13/19 1600)  BP: (!) 154/69 (08/13/19 1600)  SpO2: 99 % (08/13/19 1600) Vital Signs (24h Range):  Temp:  [97 °F (36.1 °C)-97.8 °F (36.6 °C)] 97 °F (36.1 °C)  Pulse:  [] 84  Resp:  [13-40] 15  SpO2:  [91 %-100 %] 99 %  BP: ()/() 154/69     Weight: 52.1 kg (114 lb 13.8 oz)  Body mass index is 19.11 kg/m².    SpO2: 99 %  O2 Device (Oxygen Therapy): room air      Intake/Output Summary (Last 24 hours) at 8/13/2019 1841  Last data filed at 8/12/2019 2033  Gross per 24 hour   Intake 650 ml   Output 586 ml   Net 64 ml       Lines/Drains/Airways     Central Venous Catheter Line                 Percutaneous Central Line Insertion/Assessment - triple lumen  08/12/19 1921 less than 1 day          Drain                 Hemodialysis AV Fistula 08/08/19 0214 Right upper arm 5 days          Peripheral Intravenous Line                 Midline Catheter Insertion/Assessment  - Single Lumen 08/12/19 1700 Left median cubital vein (antecubital fossa) 1 day         Peripheral IV - Single Lumen 08/11/19 2330 22 G Anterior;Left Forearm 1 day                Scheduled Meds:   aspirin  81 mg Oral Daily    chlorhexidine  15 mL Mouth/Throat BID     epoetin alfonzo-ebpx (RETACRIT) injection  50 Units/kg Intravenous Every Mon, Wed, Fri    heparin (porcine)  5,000 Units Subcutaneous Q8H    lacosamide (VIMPAT) IVPB  50 mg Intravenous Q12H    levetiracetam IVPB  250 mg Intravenous Daily    mupirocin   Nasal BID    piperacillin-tazobactam (ZOSYN) IVPB  4.5 g Intravenous Q12H    sodium thiosulfate  12.5 g Intravenous Every Mon, Wed, Fri    valproate sodium (DEPACON) IVPB  250 mg Intravenous Q24H    [START ON 8/14/2019] valproate sodium (DEPACON) IVPB  500 mg Intravenous Q24H     Continuous Infusions:   dextrose 20 % in water (D20W) 50 mL (08/13/19 1834)    dilTIAZem 5 mg/hr (08/13/19 0830)    norepinephrine bitartrate-D5W Stopped (08/12/19 2000)     PRN Meds:.sodium chloride 0.9%, dextrose 50%, glucagon (human recombinant), insulin aspart U-100, levetiracetam IVPB, metoprolol, piperacillin-tazobactam (ZOSYN) IVPB, sodium chloride 0.9%     Physical Exam     HEENT: Normocephalic, atraumatic, PERRL, Conjunctiva pink, no scleral icterus.   CVS: S1S2+, Irregular, + SM, rubs or gallops, JVP: Normal.  LUNGS: Clear  ABDOMEN: Soft, NT, BS+  EXTREMITIES: No cyanosis, clubbing or edema. Right upper arm fistula + thrill + bruit. Left thigh calciphylaxis ulcer with dressing CDI.   NEURO: Confused.         Significant Labs:   Blood Culture:   Recent Labs   Lab 08/12/19  1730 08/12/19  1754   LABBLOO No Growth to date No Growth to date   , BMP:   Recent Labs   Lab 08/12/19  0643 08/12/19  1920 08/13/19  0303   GLU 57* 145* 107    138 135*   K 3.7 3.3* 2.8*   CL 94* 99 97   CO2 21* 24 22*   BUN 20 12 11   CREATININE 8.7* 4.6* 5.1*   CALCIUM 8.9 8.3* 7.8*   MG  --  1.7 1.6   , CMP   Recent Labs   Lab 08/12/19  0643 08/12/19  1920 08/13/19  0303    138 135*   K 3.7 3.3* 2.8*   CL 94* 99 97   CO2 21* 24 22*   GLU 57* 145* 107   BUN 20 12 11   CREATININE 8.7* 4.6* 5.1*   CALCIUM 8.9 8.3* 7.8*   PROT  --  6.5 6.0   ALBUMIN 2.9* 2.8* 2.6*   BILITOT  --  0.7 0.5    ALKPHOS  --  55 49*   AST  --  15 14   ALT  --  8* 6*   ANIONGAP 24* 15 16   ESTGFRAFRICA 4.8* 10.3* 9.1*   EGFRNONAA 4.1* 8.9* 7.9*   , CBC   Recent Labs   Lab 08/12/19  0643 08/12/19  1920 08/13/19  0303   WBC 2.73* 3.34* 3.07*   HGB 11.7* 10.7* 9.1*   HCT 36.4* 32.5* 27.9*    151 126*   , INR   No results for input(s): INR, PROTIME in the last 48 hours., Lipid Panel   No results for input(s): CHOL, HDL, LDLCALC, TRIG, CHOLHDL in the last 48 hours.,   Pathology Results  (Last 10 years)    None          Significant Imaging:  I have reviewed the imaging all significant imaging for the last 24 hours.      Assessment and Plan:     IMPRESSION:  1. Altered mental status. Secondary to seizure activity vs hypoglyemia. Head CT negative. EEG consistent with encephalopathy, negative for seizure activity. Waxing and waning symptoms. Etiology? Neurology on board.   2. Congestive heart failure. Acute on chronic. LVEF ~40-45% on last echo 7/2019. Euvolemic on exam.   3. Hypoglycemia. Etiology likely poor oral intake.   4. Calciphlyaxis. Left leg ulcer. Undergoing wound care treatments.   5. Atrial fibrillation. Valvular. Persistent. Patient does not wish to be on any oral anticoagulation or to be cardioverted. Currently with RVR.   6. Hypertension. Uncontrolled at present.   7. s/p MVR via right thoracotomy approach on 3/18/19 with Dr. Lora. Functioning appropriately per last echo.   8. Mildly elevated troponins in the presence of ESRD. Chronic. Not consistent with ACS.   9. CAD. Nonobstructive on [facility] done on 3/13/2019.   10. End-stage renal disease on hemodialysis MWF. Anuric. Followed by Dr. Ingram.   11. Hypocalcemia. Nephrology on board and managing.   12. Hypokalemia. Nephrology on board and managing. .   13. Hyperlipidemia  14. Chronic tobacco abuse.  15. COPD on home O2 at 2L    PLAN:  1. Resume cardizem GTT.   2. Continue Lopressor.         Katharina Powell MD  Cardiology  [facility]

## 2019-11-14 NOTE — TELEPHONE ENCOUNTER
"Rx was sent 10/8/2019 for 1 month and 3 refills. Patient should have medication available at pharmacy.   Pharmacy notified via E-prescribe refusal    Protonix   Last Written Prescription Date:  10/8/2019  Last Fill Quantity: 30,  # refills: 3   Last office visit: 8/19/2019 with prescribing provider:  Poncho   Future Office Visit:   Next 5 appointments (look out 90 days)    Nov 25, 2019  1:30 PM CST  Office Visit with Chuy Isaac MD  [facility] ([facility]) [address] [address]  [phone]   Feb 12, 2020  1:15 PM CST  Return Visit with Patti Milligan MD  [facility] ([facility]) [id number] [place] [id number] [phone]           Requested Prescriptions   Pending Prescriptions Disp Refills     pantoprazole (PROTONIX) 40 MG EC tablet 30 tablet 3     Sig: Take 1 tablet (40 mg) by mouth daily       PPI Protocol Passed - 11/14/2019 11:43 AM        Passed - Not on Clopidogrel (unless Pantoprazole ordered)        Passed - No diagnosis of osteoporosis on record        Passed - Recent (12 mo) or future (30 days) visit within the authorizing provider's specialty     Patient has had an office visit with the authorizing provider or a provider within the authorizing providers department within the previous 12 mos or has a future within next 30 days. See \"Patient Info\" tab in inbasket, or \"Choose Columns\" in Meds & Orders section of the refill encounter.              Passed - Medication is active on med list        Passed - Patient is age 18 or older        Anastasiia Chapa RN on 11/14/2019 at 12:23 PM    "

## 2019-11-19 NOTE — TELEPHONE ENCOUNTER
Per orthotics department, patient does not have coverage of orthotics even with a PA.  What would you like to do for his plan of care?     Zenaida MORRISON, Lead RN, BSN. . .  11/19/2019, 1:10 PM

## 2019-11-20 NOTE — TELEPHONE ENCOUNTER
Informed patient/wife and they state that they were told that because these were already fit and molded they cannot return them and will be stuck with the bill. They were transferred to Anastasiia in Orthotics to discuss the possibility of a payment plan etc.     Zenaida MORRISON Lead RN, BSN. . .  11/20/2019, 10:03 AM

## 2019-11-25 ENCOUNTER — MYC REFILL (OUTPATIENT)
Dept: PALLIATIVE MEDICINE | Facility: CLINIC | Age: 49
End: 2019-11-25

## 2019-11-25 ENCOUNTER — OFFICE VISIT (OUTPATIENT)
Dept: INTERNAL MEDICINE | Facility: CLINIC | Age: 49
End: 2019-11-25
Payer: COMMERCIAL

## 2019-11-25 ENCOUNTER — MYC REFILL (OUTPATIENT)
Dept: FAMILY MEDICINE | Facility: CLINIC | Age: 49
End: 2019-11-25

## 2019-11-25 VITALS
WEIGHT: 200 LBS | TEMPERATURE: 97.6 F | HEART RATE: 90 BPM | BODY MASS INDEX: 27.89 KG/M2 | DIASTOLIC BLOOD PRESSURE: 72 MMHG | OXYGEN SATURATION: 99 % | SYSTOLIC BLOOD PRESSURE: 122 MMHG

## 2019-11-25 DIAGNOSIS — R10.9 CHRONIC ABDOMINAL PAIN: ICD-10-CM

## 2019-11-25 DIAGNOSIS — R11.0 NAUSEA: ICD-10-CM

## 2019-11-25 DIAGNOSIS — Z98.84 S/P BARIATRIC SURGERY: ICD-10-CM

## 2019-11-25 DIAGNOSIS — G89.29 CHRONIC ABDOMINAL PAIN: ICD-10-CM

## 2019-11-25 DIAGNOSIS — E46 MALNUTRITION, UNSPECIFIED TYPE (H): ICD-10-CM

## 2019-11-25 DIAGNOSIS — E43 SEVERE MALNUTRITION (H): ICD-10-CM

## 2019-11-25 DIAGNOSIS — F90.0 ADHD (ATTENTION DEFICIT HYPERACTIVITY DISORDER), INATTENTIVE TYPE: ICD-10-CM

## 2019-11-25 DIAGNOSIS — G89.4 CHRONIC PAIN SYNDROME: ICD-10-CM

## 2019-11-25 DIAGNOSIS — Z98.84 GASTRIC BYPASS STATUS FOR OBESITY: Primary | ICD-10-CM

## 2019-11-25 PROCEDURE — 99214 OFFICE O/P EST MOD 30 MIN: CPT | Performed by: INTERNAL MEDICINE

## 2019-11-25 RX ORDER — ONDANSETRON 8 MG/1
TABLET, ORALLY DISINTEGRATING ORAL
Qty: 90 TABLET | Refills: 1 | Status: CANCELLED | OUTPATIENT
Start: 2019-11-25

## 2019-11-25 RX ORDER — LORAZEPAM 1 MG/1
TABLET ORAL
Qty: 30 TABLET | Refills: 0 | Status: SHIPPED | OUTPATIENT
Start: 2019-11-25 | End: 2019-12-24

## 2019-11-25 RX ORDER — DEXTROAMPHETAMINE SACCHARATE, AMPHETAMINE ASPARTATE, DEXTROAMPHETAMINE SULFATE AND AMPHETAMINE SULFATE 5; 5; 5; 5 MG/1; MG/1; MG/1; MG/1
20 TABLET ORAL DAILY
Qty: 30 TABLET | Refills: 0 | Status: CANCELLED | OUTPATIENT
Start: 2019-11-25

## 2019-11-25 RX ORDER — FENTANYL 25 UG/1
1 PATCH TRANSDERMAL
Qty: 15 PATCH | Refills: 0 | Status: CANCELLED | OUTPATIENT
Start: 2019-11-25

## 2019-11-25 RX ORDER — FENTANYL 25 UG/1
1 PATCH TRANSDERMAL
Qty: 15 PATCH | Refills: 0 | Status: SHIPPED | OUTPATIENT
Start: 2019-11-25 | End: 2019-12-26

## 2019-11-25 RX ORDER — ONDANSETRON 8 MG/1
TABLET, ORALLY DISINTEGRATING ORAL
Qty: 90 TABLET | Refills: 1 | Status: SHIPPED | OUTPATIENT
Start: 2019-11-25 | End: 2019-12-09

## 2019-11-25 RX ORDER — LORAZEPAM 1 MG/1
TABLET ORAL
Qty: 50 TABLET | Refills: 0 | Status: CANCELLED | OUTPATIENT
Start: 2019-11-25

## 2019-11-25 ASSESSMENT — PAIN SCALES - GENERAL: PAINLEVEL: NO PAIN (0)

## 2019-11-25 NOTE — TELEPHONE ENCOUNTER
Script Eprescribed to pharmacy    Will send this to MA team to notify patient.    Signed Prescriptions:                        Disp   Refills    fentaNYL (DURAGESIC) 25 mcg/hr 72 hr patch 15 pat*0        Sig: Place 1 patch onto the skin every 48 hours remove old           patch.   May fill 12/5/19 and start 12/7/19.  Authorizing Provider: BRANDYN JENKINS MD  [facility]

## 2019-11-25 NOTE — PROGRESS NOTES
Subjective     Parker Acevedo is a 49 year old male who presents to clinic today for the following health issues:    HPI   Chief Complaint   Patient presents with     Recheck Medication       Has a bag of green fluid that drains out at night, about 500 ml.  Gets sick if not draining.      Working with Dr Milligan for pain control, oxycodone and fentanyl patch.      PICC line is working ok.  No infections for the last month.     Got his feet checked with podiatry and will be getting inserts.    Ativan was taking twice a day, working with pain clinic needs to be at one a day, will refill that today at 30 for 30 days.       Taking adderall every day once a day, that is due next week on the 5th.       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     Complication of anesthesia      Difficulty swallowing      Gastric ulcer, unspecified as acute or chronic, without mention of hemorrhage, perforation, or obstruction      Gastro-oesophageal reflux disease      Head injury      Hiatal hernia      Other bladder disorder      Other chronic pain      PONV (postoperative nausea and vomiting)      Severe malnutrition (H)     TPN     Short gut syndrome      Tobacco abuse      Current Outpatient Medications   Medication     acetaminophen (TYLENOL) 32 mg/mL liquid     ADDERALL 20 MG tablet     albuterol (PROAIR HFA/PROVENTIL HFA/VENTOLIN HFA) 108 (90 Base) MCG/ACT inhaler     blood glucose (NO BRAND SPECIFIED) lancets standard     blood glucose (NO BRAND SPECIFIED) test strip     blood glucose monitoring (NO BRAND SPECIFIED) meter device kit     carvedilol (COREG) 6.25 MG tablet     cyanocobalamin (CYANOCOBALAMIN) 1000 MCG/ML injection     [facility]  "MANAGED PATIENT     fentaNYL (DURAGESIC) 25 mcg/hr 72 hr patch     lidocaine, alum & mag hydroxide-simethicone GI Cocktail     LORazepam (ATIVAN) 1 MG tablet     Needle, Disp, (BD DISP NEEDLES) 27G X 1/2\" MISC     ondansetron (ZOFRAN-ODT) 8 MG ODT tab     order for DME     order for DME     oxyCODONE (ROXICODONE) 5 MG/5ML solution     pantoprazole (PROTONIX) 40 MG EC tablet     sodium chloride 0.9% infusion     UNABLE TO FIND     vitamin D2 (ERGOCALCIFEROL) 65260 units (1250 mcg) capsule     lidocaine (LIDODERM) 5 % Patch     naloxone (NARCAN) 4 MG/0.1ML nasal spray     polyethylene glycol (MIRALAX/GLYCOLAX) powder     sucralfate (CARAFATE) 1 GM tablet     No current facility-administered medications for this visit.      Social History     Tobacco Use     Smoking status: Current Some Day Smoker     Packs/day: 0.25     Years: 3.00     Pack years: 0.75     Types: Cigarettes     Last attempt to quit: 2018     Years since quittin.3     Smokeless tobacco: Never Used     Tobacco comment: I use an e cig every now and than   Substance Use Topics     Alcohol use: No     Comment: quit      Drug use: No     Review of Systems  Constitutional-No fevers, chills, or weight changes..  ENT-No earpain, sore throat, voice changes or rhinitis.  Cardiac-No chest pain or palpitations.  Respiratory-No cough, sob, or hemoptysis.  GI-chronic drainage from his gastric tube daily,   On chronic pain medications. .    Physical Exam  /72   Pulse 90   Temp 97.6  F (36.4  C) (Temporal)   Wt 90.7 kg (200 lb)   SpO2 99%   BMI 27.89 kg/m    General Appearance-healthy, alert, no distress  Cardiac-regular rate and rhythm  with normal S1, S2 ; no murmur, rub or gallops  Lungs-clear to auscultation    Pictures of gastric drainage.     ASSESSMENT:    This is a 48-year-old gentleman who has a history of Celestino-en-Y gastric bypass and short gut syndrome with complications of severe malnutrition requiring TPN chronically.  He has had " multiple infections of his PICC line.  He is complicated by chronic pain ADHD tobacco use.  Most recently was in the hospital with a positive culture from the PICC line however was felt to be a contaminant and additional cultures were negative.  Therefore he still has the PICC line in the right arm.    He follows with Dr. next-door for pain management.  He is on fentanyl patch and some oxycodone.    He follows with Dr. Vyas who is his surgeon and manages his TPN.  Today he does show us a 500 cc bag full of greenish fluid that has from his G-tube drains on a daily basis.    He needs a refill of his Ativan that he uses for anxiety and issues when he does his IV changes in TPN set up.  Working to reduce him from 50 pills a month down to 30, or 1 a day.    ADHD he does need Adderall and this is due on December 5 that will be refilled next week not today.    Patient can continue to follow-up and should see his surgeon to follow-up on EGD that was done on October 3 but is not listed in the procedures but is listed as an op note from October 3 with Dr. Morrow.    Electronically signed by Chuy Isaac MD

## 2019-11-25 NOTE — TELEPHONE ENCOUNTER
Patient requesting refill(s) of fentaNYL (DURAGESIC) 25 mcg/hr 72 hr patch  Last picked up from pharmacy on 11/05/19    Pt last seen by prescribing provider on 11/13/19  Next appt scheduled for 2/12/1920     checked in the past 6 months? Yes If no, print current report and give to RN    Last urine drug screen date 9/11/19  Current opioid agreement on file (completed within the last year) Yes Date of opioid agreement: 9/11/19    Processing (pick one and delete the others):      E-prescribe to [facility] - [place], [place] pharmacy    Will route to nursing pool for review and preparation of prescription(s).

## 2019-11-25 NOTE — TELEPHONE ENCOUNTER
Medication refill information reviewed.     Due date for fentaNYL (DURAGESIC) 25 mcg/hr 72 hr patch is 12/7/19     Prescriptions prepped for review.     Will route to provider.     Skinny Kim, RN  Care Coordinator   [facility] [place]

## 2019-12-04 ENCOUNTER — HOME INFUSION (PRE-WILLOW HOME INFUSION) (OUTPATIENT)
Dept: PHARMACY | Facility: CLINIC | Age: 49
End: 2019-12-04

## 2019-12-04 ENCOUNTER — MYC MEDICAL ADVICE (OUTPATIENT)
Dept: PALLIATIVE MEDICINE | Facility: CLINIC | Age: 49
End: 2019-12-04

## 2019-12-04 NOTE — TELEPHONE ENCOUNTER
From: Parker Acevedo      Created: 12/4/2019 11:05 AM        I would like you to go up another 5 to 10 ml to control all the break through pain plus if you could also do the slips for physical therapy for my feet and ankles if you have any questions feel free to call me at 6  Thank you again so much

## 2019-12-04 NOTE — TELEPHONE ENCOUNTER
Parker,  I am not willing to go up.  I think that your dosing right now is quite a bit of medication.  Adding to opioids has it's own problems.  We made a recent increase of the oxycodone.  I think that physical therapy while it can be hard-- if you keep at it is does help with time.    I am not willing to change the dose at this time.    Jessica Milligan MD  [facility]

## 2019-12-05 ENCOUNTER — MYC REFILL (OUTPATIENT)
Dept: CARE COORDINATION | Facility: CLINIC | Age: 49
End: 2019-12-05

## 2019-12-05 ENCOUNTER — TELEPHONE (OUTPATIENT)
Dept: PALLIATIVE MEDICINE | Facility: CLINIC | Age: 49
End: 2019-12-05

## 2019-12-05 DIAGNOSIS — R10.9 CHRONIC ABDOMINAL PAIN: ICD-10-CM

## 2019-12-05 DIAGNOSIS — Z98.84 S/P BARIATRIC SURGERY: ICD-10-CM

## 2019-12-05 DIAGNOSIS — F90.0 ADHD (ATTENTION DEFICIT HYPERACTIVITY DISORDER), INATTENTIVE TYPE: ICD-10-CM

## 2019-12-05 DIAGNOSIS — Z79.891 ENCOUNTER FOR LONG-TERM OPIATE ANALGESIC USE: ICD-10-CM

## 2019-12-05 DIAGNOSIS — G89.29 CHRONIC ABDOMINAL PAIN: ICD-10-CM

## 2019-12-05 RX ORDER — PANTOPRAZOLE SODIUM 40 MG/1
40 TABLET, DELAYED RELEASE ORAL DAILY
Qty: 30 TABLET | Refills: 3 | Status: CANCELLED | OUTPATIENT
Start: 2019-12-05

## 2019-12-05 NOTE — PROGRESS NOTES
This is a recent snapshot of the patient's [facility] medical record.  For current drug dose and complete information and questions, call [phone] or In Basket pool, [facility] ([id number])  CSN Number:  [id number]

## 2019-12-05 NOTE — TELEPHONE ENCOUNTER
Pt asks that this be sent to [facility] instead.  Thank you,  Jyothi Perez- Patient Representative

## 2019-12-05 NOTE — TELEPHONE ENCOUNTER
To: Parker Acevedo      From: Jyothi Winchester, RN      Created: 12/5/2019 3:32 PM        Parker, We are not making an increase to your medication regimen. I am going to include a list of different things people find helpful when they have pain outside of their medication. I know they wont all work for everyone but it's a good reminder of things we can add to the current regimen to help us feel better.     Self care strategies:      Get good quality sleep.     Healthy food and drink choices.     Staying hydrated    Manage your stress.    Speaking to a mental health professional and managing depression (chronic pain is very difficult to manage without this type of support at least occasionally)     Staying socially connected to family and friends.    Staying socially connected to a support group.    Practice relaxation techniques.    Practice distraction technique and limit focus on pain (movies, music, a new interest or hobby, a great book)    Journaling.     Take hot showers and baths.     Stretch regularly.     Develop a gentle exercise routine.     Meditation.    Breathing techniques.     Massage.     Keep a positive attitude and avoiding negative self talk.    Thank you  ERIN Lazar, RN  Care Coordinator  [facility]    --------------------------------------------------------  From: Parker Acevedo  Sent: 12/4/2019  6:41 PM CST  To: Patti Milligan MD  Subject: RE:(No subject)    Plus I went down on my Ativant to 1 a day just to let you know if you would please go up that extra 5ml it would help me out greatly any questions please call Thank you so much or let me know if I should need an appointment

## 2019-12-05 NOTE — TELEPHONE ENCOUNTER
adderall      Last Written Prescription Date:  11/05/19  Last Fill Quantity: 30,   # refills: 0  Last Office Visit: 11/25/19  Future Office visit:    Next 5 appointments (look out 90 days)    Feb 12, 2020  1:15 PM CST  Return Visit with Patti Milligan MD  [facility] [place] ([facility]) [address]  [place] [address]  [phone]           Routing refill request to provider for review/approval because:

## 2019-12-06 RX ORDER — DEXTROAMPHETAMINE SACCHARATE, AMPHETAMINE ASPARTATE, DEXTROAMPHETAMINE SULFATE, AND AMPHETAMINE SULFATE 5; 5; 5; 5 MG/1; MG/1; MG/1; MG/1
TABLET ORAL
Qty: 30 TABLET | Refills: 0 | OUTPATIENT
Start: 2019-12-06

## 2019-12-06 RX ORDER — DEXTROAMPHETAMINE SACCHARATE, AMPHETAMINE ASPARTATE, DEXTROAMPHETAMINE SULFATE AND AMPHETAMINE SULFATE 5; 5; 5; 5 MG/1; MG/1; MG/1; MG/1
20 TABLET ORAL DAILY
Qty: 30 TABLET | Refills: 0 | Status: SHIPPED | OUTPATIENT
Start: 2019-12-06 | End: 2020-01-01

## 2019-12-06 NOTE — TELEPHONE ENCOUNTER
Parker also requesting a new prescription for Valium 5mg tablets.  Parker states Ativan is not working for him any more.  Parker states his wife will be picking up another prescription for him today about 10:30am and he would like to have this ready for her as well.    Parker states he will bring in his unused Ativan when he comes to see Dr Isaac in the next few months.

## 2019-12-06 NOTE — TELEPHONE ENCOUNTER
This was managed in a separate encounter.     YADIRA LazarN, RN  Care Coordinator  [facility] [place]

## 2019-12-06 NOTE — TELEPHONE ENCOUNTER
adderall      Last Written Prescription Date:  11/05/19  Last Fill Quantity: 30,   # refills: 0  Last Office Visit: 11/25/19  Future Office visit:    Next 5 appointments (look out 90 days)    Feb 12, 2020  1:15 PM CST  Return Visit with Patti Milligan MD  [facility] [place] ([facility]) [address]  [place] [address]  [phone]           Routing refill request to provider for review/approval because:  Drug not on the [facility], [facility] or  Health refill protocol or controlled substance

## 2019-12-06 NOTE — TELEPHONE ENCOUNTER
Patient was informed that Dr. Isaac refilled the adderall but he will not fill the valium for him.    Pratibha Ferris, CMA

## 2019-12-08 ENCOUNTER — APPOINTMENT (OUTPATIENT)
Dept: GENERAL RADIOLOGY | Facility: CLINIC | Age: 49
End: 2019-12-08
Attending: FAMILY MEDICINE
Payer: COMMERCIAL

## 2019-12-08 ENCOUNTER — HOSPITAL ENCOUNTER (EMERGENCY)
Facility: CLINIC | Age: 49
Discharge: HOME | End: 2019-12-09
Attending: FAMILY MEDICINE | Admitting: FAMILY MEDICINE
Payer: COMMERCIAL

## 2019-12-08 VITALS
WEIGHT: 174 LBS | OXYGEN SATURATION: 97 % | DIASTOLIC BLOOD PRESSURE: 88 MMHG | RESPIRATION RATE: 18 BRPM | BODY MASS INDEX: 24.27 KG/M2 | HEART RATE: 99 BPM | TEMPERATURE: 97.9 F | SYSTOLIC BLOOD PRESSURE: 136 MMHG

## 2019-12-08 DIAGNOSIS — S99.922A FOOT INJURY, LEFT, INITIAL ENCOUNTER: ICD-10-CM
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
## 2019-12-08 PROCEDURE — 73610 X-RAY EXAM OF ANKLE: CPT | Mod: TC,LT

## 2019-12-08 PROCEDURE — 99284 EMERGENCY DEPT VISIT MOD MDM: CPT | Performed by: FAMILY MEDICINE

## 2019-12-08 PROCEDURE — 99283 EMERGENCY DEPT VISIT LOW MDM: CPT | Mod: Z6 | Performed by: FAMILY MEDICINE

## 2019-12-08 PROCEDURE — 73630 X-RAY EXAM OF FOOT: CPT | Mod: TC,LT

## 2019-12-08 PROCEDURE — 29515 APPLICATION SHORT LEG SPLINT: CPT | Mod: LT | Performed by: FAMILY MEDICINE

## 2019-12-08 RX ORDER — ACETAMINOPHEN 500 MG
1000 TABLET ORAL ONCE
Status: DISCONTINUED | OUTPATIENT
Start: 2019-12-08 | End: 2019-12-09 | Stop reason: HOSPADM

## 2019-12-08 NOTE — ED AVS SNAPSHOT
[facility]  [address] [place] [address]  Phone:  [phone]  Fax:  [phone]                                    Parker Acevedo   MRN: [id number]    Department:  [facility]   Date of Visit:  12/8/2019           After Visit Summary Signature Page    I have received my discharge instructions, and my questions have been answered. I have discussed any challenges I see with this plan with the nurse or doctor.    ..........................................................................................................................................  Patient/Patient Representative Signature      ..........................................................................................................................................  Patient Representative Print Name and Relationship to Patient    ..................................................               ................................................  Date                                   Time    ..........................................................................................................................................  Reviewed by Signature/Title    ...................................................              ..............................................  Date                                               Time          22EPIC Rev 08/18

## 2019-12-09 ENCOUNTER — PATIENT OUTREACH (OUTPATIENT)
Dept: CARE COORDINATION | Facility: CLINIC | Age: 49
End: 2019-12-09

## 2019-12-09 ENCOUNTER — MYC REFILL (OUTPATIENT)
Dept: PALLIATIVE MEDICINE | Facility: CLINIC | Age: 49
End: 2019-12-09

## 2019-12-09 ENCOUNTER — MYC REFILL (OUTPATIENT)
Dept: INTERNAL MEDICINE | Facility: CLINIC | Age: 49
End: 2019-12-09

## 2019-12-09 DIAGNOSIS — Z79.891 ENCOUNTER FOR LONG-TERM OPIATE ANALGESIC USE: ICD-10-CM

## 2019-12-09 DIAGNOSIS — G89.29 CHRONIC ABDOMINAL PAIN: ICD-10-CM

## 2019-12-09 DIAGNOSIS — R10.9 CHRONIC ABDOMINAL PAIN: ICD-10-CM

## 2019-12-09 DIAGNOSIS — R11.0 NAUSEA: ICD-10-CM

## 2019-12-09 RX ORDER — OXYCODONE HCL 5 MG/5 ML
5-10 SOLUTION, ORAL ORAL 2 TIMES DAILY PRN
Qty: 450 ML | Refills: 0 | Status: CANCELLED | OUTPATIENT
Start: 2019-12-09

## 2019-12-09 NOTE — PROGRESS NOTES
Clinic Care Coordination Contact    Follow Up Progress Note      Assessment: Patient was at [facility] ED 12/8/19 for fall from ladder and left foot and ankle injury.  RN CC spoke with patient/spouse Rose (consent to communicate on file).  Left ankle swelling has decreased with elevation and ice, pain is 7/10 today.  Patient will follow up with podiatry if no improvement by the end of the week.  Patient has a future appointment with [facility] 12/13/19 for a pain pump consultation.  Patient and spouse have not followed through on scheduling with cardiology or surgery, but state they will work on this.    Goals addressed this encounter:   Goals Addressed                 This Visit's Progress      #1 Medical (pt-stated)   On track     Goal Statement: I want to be free of infection.  Measure of Success: Patient will remain free of any infections.  Supportive Steps to Achieve: New PICC line placed, home infusion services, follow up appointments as instructed, washing bathtub/shower with bleach prior to bathing  Barriers: chronic TPN  Strengths: care coordination, home infusion services  Date to Achieve By: 1/1/20  Patient expressed understanding of goal: yes           #2 Medical (pt-stated)   Not on track     Goal Statement: I will see cardiology.  Measure of Success: Appointment will be scheduled and attended.  Supportive Steps to Achieve: Review of hospital discharge instructions.  Barriers: multiple specialists and appointments  Strengths: care coordination, supportive spouse  Date to Achieve By: 2/1/20  Patient expressed understanding of goal: yes           #3 Medical (pt-stated)   Not on track     Goal Statement: I will see surgery as instructed.  Measure of Success: Appointment will be scheduled and attended.  Supportive Steps to Achieve: Review of hospital discharge instructions.  Barriers: multiple specialists and discharge instructions.  Strengths: care coordination, supportive  spouse  Date to Achieve By: 2/1/20  Patient expressed understanding of goal: yes                Intervention/Education provided during outreach: RN CC reviewed ED discharge instructions, when to seek care, plan of care and importance of follow up.     Outreach Frequency: monthly    Plan:   1. Patient will ice, elevate and rest left foot and f/u with podiatry if no improvement by the end of the week.  2. Patient will schedule appointments with cardiology and surgery.  3. Patient has a future appointment with [facility] 12/13/19 to consult regarding a pain pump.  4. RN Care Coordinator will follow up in 1 month.    Melissa Behl BSN, RN, PHN, CCM  Primary Care Clinical RN Care Coordinator  [facility]   [phone]

## 2019-12-09 NOTE — ED PROVIDER NOTES
ED Provider Note     Parker Acevedo  [id number]  December 8, 2019      CC:     Chief Complaint   Patient presents with     Foot Pain     Fall          History is obtained from the patient.  He is accompanied by his wife.    HPI: Parker Acevedo is a 49 year old male presenting with pain in the left foot and ankle following an injury that he sustained tonight about 3 hours ago.  Patient was trying to get some helium balloons that were caught on the ceiling fan.  He was on a ladder, second step from the top, when the balloon got caught, and he fell backward.  He put his foot out to try to break his fall, and hit the arch of his foot against the second to the bottom step of the ladder.  He had immediate excruciating pain across the top of the foot.  He was able to take a couple of steps but was not able to bear weight at all comfortably.  He has pain upon weightbearing but also pain when he lifts his foot.  He has a history of rheumatoid arthritis and has had previous fractures in that foot.  He has not taken anything for the pain.  Current pain levels rated 9/10.  He denies any other injuries to the head, back, hips or legs.    PMH/Problem List:   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)      Chronic abdominal pain      CLABSI (central line-associated bloodstream infection)      Complication of anesthesia      Difficulty swallowing      Gastric ulcer, unspecified as acute or chronic, without mention of hemorrhage, perforation, or obstruction      Gastro-oesophageal reflux disease      Head injury      Hiatal hernia      Other bladder disorder      Other chronic pain      PONV (postoperative nausea and vomiting)      Severe malnutrition (H)      Short gut syndrome      Tobacco abuse        PSH:   Past Surgical History:   Procedure  Laterality Date     AMPUTATION       APPENDECTOMY       BACK SURGERY  11/3/2014    curve in the spine     BIOPSY LYMPH NODE CERVICAL N/A 2/20/2015    Procedure: BIOPSY LYMPH NODE CERVICAL;  Surgeon: Baron Scanlon MD;  Location: [facility] OR     C GASTRIC BYPASS,OBESE<100CM SHAYLEE-EN-Y  2002    lost 300 pounds     CHOLECYSTECTOMY       DISCECTOMY, FUSION CERVICAL ANTERIOR ONE LEVEL, COMBINED N/A 2/15/2017    Procedure: COMBINED DISCECTOMY, FUSION CERVICAL ANTERIOR ONE LEVEL;  Surgeon: Darren Campos MD;  Location: [facility] OR     ENDOSCOPIC INSERTION TUBE GASTROSTOMY  9/9/2013    Procedure: ENDOSCOPIC INSERTION TUBE GASTROSTOMY;;  Surgeon: Francis Vyas MD;  [facility]     ENDOSCOPIC ULTRASOUND UPPER GASTROINTESTINAL TRACT (GI)  4/29/2011    Procedure:ENDOSCOPIC ULTRASOUND UPPER GASTROINTESTINAL TRACT (GI); Both Procedures done Conjointly; Surgeon:NEREIDA HOUSER; [facility]     ENDOSCOPIC ULTRASOUND UPPER GASTROINTESTINAL TRACT (GI)  9/9/2013    Procedure: ENDOSCOPIC ULTRASOUND UPPER GASTROINTESTINAL TRACT (GI);  Endoscopic Ultrasound Guide Gastrostomy Tube Placement  C-arm;  Surgeon: Noe Lizarraga MD;  [facility] OR     ENDOSCOPY  03/25/11    EGD, [facility]     ENDOSCOPY  08/04/09    Upper Endoscopy, [facility]     ENDOSCOPY  01/05/09    Upper Endoscopy, [facility]     ESOPHAGOSCOPY, GASTROSCOPY, DUODENOSCOPY (EGD), COMBINED  4/20/2011    Procedure:COMBINED ESOPHAGOSCOPY, GASTROSCOPY, DUODENOSCOPY (EGD); Surgeon:FRANCIS VYAS; [facility]     ESOPHAGOSCOPY, GASTROSCOPY, DUODENOSCOPY (EGD), COMBINED  6/15/2011    Procedure:COMBINED ESOPHAGOSCOPY, GASTROSCOPY, DUODENOSCOPY (EGD); Surgeon:FRANCIS VYAS; [facility]     ESOPHAGOSCOPY, GASTROSCOPY, DUODENOSCOPY (EGD), COMBINED  6/12/2013    Procedure: COMBINED ESOPHAGOSCOPY, GASTROSCOPY, DUODENOSCOPY (EGD);;  Surgeon: Francis Vyas MD;  [facility] GI     ESOPHAGOSCOPY, GASTROSCOPY, DUODENOSCOPY  (EGD), COMBINED  11/22/2013    Procedure: COMBINED ESOPHAGOSCOPY, GASTROSCOPY, DUODENOSCOPY (EGD);;  Surgeon: Francis Vyas MD;  [facility]     ESOPHAGOSCOPY, GASTROSCOPY, DUODENOSCOPY (EGD), COMBINED  4/30/2014    Procedure: COMBINED ESOPHAGOSCOPY, GASTROSCOPY, DUODENOSCOPY (EGD);  Surgeon: Francis Vyas MD;  Location:  GI     ESOPHAGOSCOPY, GASTROSCOPY, DUODENOSCOPY (EGD), COMBINED N/A 2/20/2015    Procedure: COMBINED ESOPHAGOSCOPY, GASTROSCOPY, DUODENOSCOPY (EGD), BIOPSY SINGLE OR MULTIPLE;  Surgeon: Baron Scanlon MD;  Location:  OR     ESOPHAGOSCOPY, GASTROSCOPY, DUODENOSCOPY (EGD), COMBINED N/A 9/30/2015    Procedure: COMBINED ESOPHAGOSCOPY, GASTROSCOPY, DUODENOSCOPY (EGD);  Surgeon: Francis Vyas MD;  Location:  GI     ESOPHAGOSCOPY, GASTROSCOPY, DUODENOSCOPY (EGD), COMBINED N/A 10/3/2019    Procedure: Upper Endoscopy;  Surgeon: Clif Morrow MD;  [facility]     GASTRECTOMY  6/22/2012    Procedure: GASTRECTOMY;  Open Approach, Excise Ulcers,Partial Gastrectomy, Esophagojejunostomy, Hiatal Hernia Repair, Extensive Lysis of Adhesions and Esaphagogastrodudenoscopy.;  Surgeon: Francis Vyas MD;  [facility]     GASTROJEJUNOSTOMY  08/26/09    Extensice enterolysis, partial resect. jejunum, part. resect gastric pouch, gastrojejunostomy anastomosis     HC ESOPH/GAS REFLUX TEST W NASAL IMPED ELECTRODE  8/5/2013    Procedure: ESOPHAGEAL IMPEDENCE FUNCTION TEST 1 HOUR OR LESS;  Surgeon: Halie Lang MD;  Location:  GI     HEAD & NECK SURGERY  2/15/2017    C5-C6     HERNIA REPAIR  2006    Umbilical hernia     HERNIORRHAPHY HIATAL  6/22/2012    Procedure: HERNIORRHAPHY HIATAL;;  Surgeon: Francis Vyas MD;  [facility]     HERNIORRHAPHY INGUINAL  11/22/2013    Procedure: HERNIORRHAPHY INGUINAL;;  Surgeon: Francis Vyas MD;  [facility]     INSERT PORT VASCULAR ACCESS Right 12/19/2017    Procedure: INSERT PORT VASCULAR ACCESS;   Right Chest Port Placement ;  Surgeon: Lisandro Alejandro PA-C;  [facility]     INSERT PORT VASCULAR ACCESS Right 8/2/2018    Procedure: INSERT PORT VASCULAR ACCESS;  Place single lumen tunneled central venous access catheter;  Surgeon: Guy Jamil PA-C;  [facility]     IR CVC TUNNEL PLACEMENT > 5 YRS OF AGE  8/7/2019     IR CVC TUNNEL REMOVAL RIGHT  10/1/2019     IR FOLLOW UP VISIT OUTPATIENT  8/7/2019     IR PICC PLACEMENT > 5 YRS OF AGE  3/7/2019     LAPAROTOMY EXPLORATORY  11/22/2013    Procedure: LAPAROTOMY EXPLORATORY;  Exploratory Laparotomy, Upper Endoscopy, Left Inguinal Hernia Repair;  Surgeon: Francis Vyas MD;  [facility]     ORTHOPEDIC SURGERY       PICC INSERTION Right 03/16/2017    5fr DL BioFlo PICC, 42cm (3cm external) in the R medial brachial vein w/ tip in the SVC RA junction.     PICC INSERTION Left 09/23/2017    5fr DL BioFlo PICC, 45cm (1cm external) in the L basilic vein w/ tip in the SVC RA junction.     PICC INSERTION Right 05/16/2019    5Fr - 43cm, Medial brachial vein, low SVC     PICC INSERTION Right 10/02/2019    5Fr - 43cm (2cm external), basilic vein, low SVC     SHAYLEE EN Y BOWEL  2003     SOFT TISSUE SURGERY       THORACIC SURGERY       TONSILLECTOMY       TRANSESOPHAGEAL ECHOCARDIOGRAM INTRAOPERATIVE N/A 1/8/2019    Procedure: TRANSESOPHAGEAL ECHOCARDIOGRAM INTRAOPERATIVE;  Surgeon: GENERIC ANESTHESIA PROVIDER;  Location: [facility] OR       MEDS: No current facility-administered medications on file prior to encounter.   acetaminophen (TYLENOL) 32 mg/mL liquid, Take 500 mg by mouth every 4 hours as needed for fever or mild pain  albuterol (PROAIR HFA/PROVENTIL HFA/VENTOLIN HFA) 108 (90 Base) MCG/ACT inhaler, Inhale 2 puffs into the lungs every 4 hours as needed for shortness of breath / dyspnea or wheezing  amphetamine-dextroamphetamine (ADDERALL) 20 MG tablet, Take 1 tablet (20 mg) by mouth daily  blood glucose (NO BRAND SPECIFIED) lancets standard,  "Use to test blood sugar 1 times daily or as directed.  blood glucose (NO BRAND SPECIFIED) test strip, Use to test blood sugar 1 times daily or as directed.  blood glucose monitoring (NO BRAND SPECIFIED) meter device kit, Use to test blood sugar 1 times daily or as directed.  carvedilol (COREG) 6.25 MG tablet, Take 6.25 mg by mouth 2 times daily (with meals)  cyanocobalamin (CYANOCOBALAMIN) 1000 MCG/ML injection, Inject 1 mL (1,000 mcg) into the muscle every 30 days  [facility] INFUSION MANAGED PATIENT, Contact [facility] for patient specific medication information at [phone] on admission and discharge from the hospital.  Phones are answered 24 hours a day 7 days a week 365 days a year.    Providers - Choose \"CONTINUE HOME MED (no script)\" at discharge if patient treatment with home infusion will continue.  fentaNYL (DURAGESIC) 25 mcg/hr 72 hr patch, Place 1 patch onto the skin every 48 hours remove old patch.   May fill 12/5/19 and start 12/7/19.  lidocaine (LIDODERM) 5 % Patch, Place 1-2 patches onto the skin every 24 hours Wear for 12 hours, remove for 12 hours.  OK to cut to better fit to size. (Patient not taking: Reported on 11/25/2019)  lidocaine, alum & mag hydroxide-simethicone GI Cocktail, 30 ml daily as needed for mouth/stomach pain.  LORazepam (ATIVAN) 1 MG tablet, TAKE 1mg BY MOUTH AS NEEDED FOR ANXIETY WITH TPN AND MEDICATIONS-just once a day, 30 to last 30 days.  naloxone (NARCAN) 4 MG/0.1ML nasal spray, Spray 4 mg into one nostril alternating nostrils once as needed every 2-3 minutes until assistance arrives  Needle, Disp, (BD DISP NEEDLES) 27G X 1/2\" MISC, 1 Device every 30 days Use for cyanocobalamin injection once q 30 days.  ondansetron (ZOFRAN-ODT) 8 MG ODT tab, DISSOLVE ONE TABLET ON TONGUE EVERY 8 HOURS AS NEEDED FOR NAUSEA  order for DME, Equipment being ordered: Urine Drainage bag, leg.  order for DME, Equipment being ordered: Bilateral knee high chronic venous " insufficiency stockings--  mild-moderate pressures.  oxyCODONE (ROXICODONE) 5 MG/5ML solution, Take 5-10 mLs (5-10 mg) by mouth 2 times daily as needed for pain Max of 15mg/day. 30 day supply. May fill on 11/13/19 to start on 11/14/19  pantoprazole (PROTONIX) 40 MG EC tablet, Take 1 tablet (40 mg) by mouth daily  polyethylene glycol (MIRALAX/GLYCOLAX) powder, Take 17 g (1 capful) by mouth daily (Patient not taking: Reported on 11/25/2019)  sodium chloride 0.9% infusion, Inject 1,000-2,000 mLs into the vein as needed   sucralfate (CARAFATE) 1 GM tablet, Take 1 tablet (1 g) by mouth 4 times daily (Patient not taking: Reported on 11/25/2019)  UNABLE TO FIND, States takes TPN 2050 ml  Every 14 hours through PICC  vitamin D2 (ERGOCALCIFEROL) 14902 units (1250 mcg) capsule, Take 1 capsule (50,000 Units) by mouth once a week  [DISCONTINUED] NO ACTIVE MEDICATIONS, .        Allergies: Bactrim [sulfamethoxazole w/trimethoprim]; Penicillins; Doxycycline; and Vancomycin    Triage and nursing notes were reviewed.    ROS: All other systems were reviewed and are negative    Physical Exam:  Vitals:    12/08/19 2304   BP: 136/88   Pulse: 99   Resp: 18   Temp: 97.9  F (36.6  C)   TempSrc: Oral   SpO2: 97%   Weight: 78.9 kg (174 lb)     GENERAL APPEARANCE: Alert and oriented x3.  GCS 15  HEAD: atraumatic  EYES: PERRL, EOMI  HENT: External exam  NECK: Supple  RESP: lungs clear to auscultation   CV: regular rate and rhythm, no significant murmurs or rubs  ABDOMEN: soft, nontender, no masses with normal bowel sounds  EXT: Some swelling of the left ankle, no obvious bruising, deformity or swelling.  Pain primarily over the midfoot dorsally  SKIN: no rash; as above  NEURO: mentation and speech normal; no focal deficits    Labs/Imaging Results:  Results for orders placed or performed during the hospital encounter of 12/08/19 (from the past 24 hour(s))   XR Ankle Left G/E 3 Views    Narrative    XR FOOT LEFT GREATER THAN 3 VIEWS, XR ANKLE  LEFT GREATER THAN 3 VIEWS    12/8/2019 11:31 PM     HISTORY: Fell off ladder, pain.    COMPARISON: None.       Impression    IMPRESSION: No acute fracture or dislocation. Old ossific fragment at  the tip of the lateral malleolus.   Foot XR, G/E 3 views, left    Narrative    XR FOOT LEFT GREATER THAN 3 VIEWS, XR ANKLE LEFT GREATER THAN 3 VIEWS    12/8/2019 11:31 PM     HISTORY: Fell off ladder, pain.    COMPARISON: None.       Impression    IMPRESSION: No acute fracture or dislocation. Old ossific fragment at  the tip of the lateral malleolus.     *Note: Due to a large number of results and/or encounters for the requested time period, some results have not been displayed. A complete set of results can be found in Results Review.           Impression:  Final diagnoses:   Foot and ankle injury, left   Fall from ladder         ED Course & Medical Decision Making (Plan):  Parker Acevedo is a 49 year old male who presented by private vehicle for evaluation of left foot and ankle pain after he fell off a ladder.  Patient states he was at the second to top rung of the ladder trying to get some helium balloons that were caught in the ceiling fan.  He fell back, and struck the arch of his foot against the second rung of the ladder.  He denied having any significant head, neck, or back injury.  He had severe pain across the dorsal aspect of the foot and ankle.  Patient has pain with weightbearing.  He was seen after arrival in the ED.  Vital signs were normal.  He did not have any significant head, neck or back injury.  Lower extremity pain was limited to the ankle and foot.  He does not have any pain over the heel or arch but over the midfoot dorsally and over the anterior ankle.  There is no proximal leg tenderness on palpation.  There is no significant swelling or ecchymosis.  X-ray of the left ankle and foot reveals no acute fracture or dislocation.  Patient was reassured that there is no signs of fracture.  He was  placed in a cam walking boot since he is having difficulty bearing weight.  He has a scooter at home that he can use instead of crutches.  I asked him to ice and elevate.  Follow-up with podiatry in 4-5 days if not improving.  Take ibuprofen and Tylenol as needed for pain.    Written after-visit summary and instructions were given at the time of discharge.          Follow Up Plan:  Skinny Friend, JEAN-PIERRE  [address] [place] [address]  [phone]    In 5 days  if not improving            Disclaimer: This note consists of words and symbols derived from keyboarding and dictation using voice recognition software.  As a result, there may be errors that have gone undetected.  Please consider this when interpreting information found in this note.       Darlene Henderson MD  12/09/19 [id number]

## 2019-12-09 NOTE — ED TRIAGE NOTES
"Patient states he fell approximately 9 feet off a ladder while trying to get balloons out of his ceiling fan, c/o pain in L foot. States this happened \"a few hours ago\" and has not taken Ibuprofen or Tylenol.   "

## 2019-12-09 NOTE — DISCHARGE INSTRUCTIONS
Your x-rays of the ankle and foot are reassuring.  There is no findings to suggest a fracture.  Ice the foot and ankle and elevate as much as possible.  Wear the cam walking boot as needed.  Follow-up with our podiatrist, Dr. Friend in 5-7 days if not improving.

## 2019-12-10 ENCOUNTER — MEDICAL CORRESPONDENCE (OUTPATIENT)
Dept: HEALTH INFORMATION MANAGEMENT | Facility: CLINIC | Age: 49
End: 2019-12-10

## 2019-12-10 LAB
ALBUMIN SERPL-MCNC: 3.4 G/DL (ref 3.4–5)
ALP SERPL-CCNC: 80 U/L (ref 40–150)
ALT SERPL W P-5'-P-CCNC: 19 U/L (ref 0–70)
ANION GAP SERPL CALCULATED.3IONS-SCNC: 1 MMOL/L (ref 3–14)
AST SERPL W P-5'-P-CCNC: 15 U/L (ref 0–45)
BASOPHILS # BLD AUTO: 0 10E9/L (ref 0–0.2)
BASOPHILS NFR BLD AUTO: 0.5 %
BILIRUB DIRECT SERPL-MCNC: 0.2 MG/DL (ref 0–0.2)
BILIRUB SERPL-MCNC: 0.8 MG/DL (ref 0.2–1.3)
BUN SERPL-MCNC: 14 MG/DL (ref 7–30)
CALCIUM SERPL-MCNC: 8.4 MG/DL (ref 8.5–10.1)
CHLORIDE SERPL-SCNC: 112 MMOL/L (ref 94–109)
CO2 SERPL-SCNC: 31 MMOL/L (ref 20–32)
CREAT SERPL-MCNC: 0.77 MG/DL (ref 0.66–1.25)
DIFFERENTIAL METHOD BLD: ABNORMAL
EOSINOPHIL NFR BLD AUTO: 1.7 %
ERYTHROCYTE [DISTWIDTH] IN BLOOD BY AUTOMATED COUNT: 13.9 % (ref 10–15)
GFR SERPL CREATININE-BSD FRML MDRD: >90 ML/MIN/{1.73_M2}
GLUCOSE SERPL-MCNC: 89 MG/DL (ref 70–99)
HCT VFR BLD AUTO: 34.1 % (ref 40–53)
HGB BLD-MCNC: 11 G/DL (ref 13.3–17.7)
IMM GRANULOCYTES # BLD: 0 10E9/L (ref 0–0.4)
IMM GRANULOCYTES NFR BLD: 0.2 %
LYMPHOCYTES # BLD AUTO: 2 10E9/L (ref 0.8–5.3)
LYMPHOCYTES NFR BLD AUTO: 24.2 %
MAGNESIUM SERPL-MCNC: 2.1 MG/DL (ref 1.6–2.3)
MCH RBC QN AUTO: 31.1 PG (ref 26.5–33)
MCHC RBC AUTO-ENTMCNC: 32.3 G/DL (ref 31.5–36.5)
MCV RBC AUTO: 96 FL (ref 78–100)
MONOCYTES # BLD AUTO: 1.2 10E9/L (ref 0–1.3)
MONOCYTES NFR BLD AUTO: 14.4 %
NEUTROPHILS # BLD AUTO: 4.8 10E9/L (ref 1.6–8.3)
NEUTROPHILS NFR BLD AUTO: 59 %
NRBC # BLD AUTO: 0 10*3/UL
NRBC BLD AUTO-RTO: 0 /100
PHOSPHATE SERPL-MCNC: 3.3 MG/DL (ref 2.5–4.5)
PLATELET # BLD AUTO: 176 10E9/L (ref 150–450)
POTASSIUM SERPL-SCNC: 3.3 MMOL/L (ref 3.4–5.3)
PROT SERPL-MCNC: 6.8 G/DL (ref 6.8–8.8)
RBC # BLD AUTO: 3.54 10E12/L (ref 4.4–5.9)
SODIUM SERPL-SCNC: 144 MMOL/L (ref 133–144)
TRIGL SERPL-MCNC: 56 MG/DL
WBC # BLD AUTO: 8.2 10E9/L (ref 4–11)

## 2019-12-10 PROCEDURE — 83735 ASSAY OF MAGNESIUM: CPT | Performed by: SURGERY

## 2019-12-10 PROCEDURE — 82248 BILIRUBIN DIRECT: CPT | Performed by: SURGERY

## 2019-12-10 PROCEDURE — 84100 ASSAY OF PHOSPHORUS: CPT | Performed by: SURGERY

## 2019-12-10 PROCEDURE — 80053 COMPREHEN METABOLIC PANEL: CPT | Performed by: SURGERY

## 2019-12-10 PROCEDURE — 84478 ASSAY OF TRIGLYCERIDES: CPT | Performed by: SURGERY

## 2019-12-10 PROCEDURE — 85025 COMPLETE CBC W/AUTO DIFF WBC: CPT | Performed by: SURGERY

## 2019-12-10 RX ORDER — ONDANSETRON 8 MG/1
TABLET, ORALLY DISINTEGRATING ORAL
Qty: 90 TABLET | Refills: 1 | Status: SHIPPED | OUTPATIENT
Start: 2019-12-10 | End: 2020-01-01

## 2019-12-10 RX ORDER — OXYCODONE HCL 5 MG/5 ML
5-10 SOLUTION, ORAL ORAL 2 TIMES DAILY PRN
Qty: 450 ML | Refills: 0 | Status: SHIPPED | OUTPATIENT
Start: 2019-12-10 | End: 2020-01-03

## 2019-12-10 NOTE — TELEPHONE ENCOUNTER
Received call from patient requesting refill(s) of oxyCODONE (ROXICODONE) 5 MG/5ML solution     Last dispensed from pharmacy on 11/13/19    Pt last seen by prescribing provider on 11/13/19  Next appt scheduled for 2/12/20     checked in the past 6 months? Yes If no, print current report and give to RN    Last urine drug screen date 9/11/19  Current opioid agreement on file (completed within the last year) Yes Date of opioid agreement: 9/11/19    E-prescribe to   [place], [place] - [address] [place] [address]  Phone: [phone] Fax: [phone]    Will route to nursing pool for review and preparation of prescription(s).

## 2019-12-10 NOTE — TELEPHONE ENCOUNTER
Medication refill information reviewed.     Due date for oxyCODONE (ROXICODONE) 5 MG/5ML solution  is 12/14/19     Prescriptions prepped for review.     Will route to provider.

## 2019-12-10 NOTE — TELEPHONE ENCOUNTER
Script Eprescribed to pharmacy    Will send this to MA team to notify patient.    Signed Prescriptions:                        Disp   Refills    oxyCODONE (ROXICODONE) 5 MG/5ML solution   450 mL 0        Sig: Take 5-10 mLs (5-10 mg) by mouth 2 times daily as           needed for pain Max of 15mg/day. 30 day supply.           May fill on 12/13/19 to start on 12/14/19  Authorizing Provider: BRANDYN JENKINS MD  [facility]

## 2019-12-10 NOTE — TELEPHONE ENCOUNTER
"zofran  Last Written Prescription Date:  11/25/2019  Last Fill Quantity: 90,  # refills: 1   Last office visit: 11/25/2019 with prescribing provider:     Future Office Visit:   Next 5 appointments (look out 90 days)    Feb 12, 2020  1:15 PM CST  [facility]igan [facility]  [facility]) [address] [address] [phone]           Requested Prescriptions   Pending Prescriptions Disp Refills     ondansetron (ZOFRAN-ODT) 8 MG ODT tab 90 tablet 1     Sig: DISSOLVE ONE TABLET ON TONGUE EVERY 8 HOURS AS NEEDED FOR NAUSEA        Antivertigo/Antiemetic Agents Passed - 12/10/2019  7:05 AM        Passed - Recent (12 mo) or future (30 days) visit within the authorizing provider's specialty     Patient has had an office visit with the authorizing provider or a provider within the authorizing providers department within the previous 12 mos or has a future within next 30 days. See \"Patient Info\" tab in inbasket, or \"Choose Columns\" in Meds & Orders section of the refill encounter.              Passed - Medication is active on med list        Passed - Patient is 18 years of age or older          "

## 2019-12-12 ENCOUNTER — HOME INFUSION (PRE-WILLOW HOME INFUSION) (OUTPATIENT)
Dept: PHARMACY | Facility: CLINIC | Age: 49
End: 2019-12-12

## 2019-12-13 ENCOUNTER — TRANSFERRED RECORDS (OUTPATIENT)
Dept: HEALTH INFORMATION MANAGEMENT | Facility: CLINIC | Age: 49
End: 2019-12-13

## 2019-12-15 ENCOUNTER — TELEPHONE (OUTPATIENT)
Dept: NURSING | Facility: CLINIC | Age: 49
End: 2019-12-15

## 2019-12-15 NOTE — TELEPHONE ENCOUNTER
Home Care Nurse calling requesting that the patient's PCP be paged for orders. Patient has a PICC that has pulled partway out and needs to be discontinued. Need an order to do that. Also needs an order to start a peripheral IV for hydration. [facility] P[facility] line replacement or Cm. Patient is on TPN at home. On call MD/Dr. Rika Charles ( called back and OKed the above orders.  Eva Hightower RN  [facility]

## 2019-12-16 ENCOUNTER — PATIENT OUTREACH (OUTPATIENT)
Dept: CARE COORDINATION | Facility: CLINIC | Age: 49
End: 2019-12-16

## 2019-12-16 ENCOUNTER — HOME INFUSION (PRE-WILLOW HOME INFUSION) (OUTPATIENT)
Dept: PHARMACY | Facility: CLINIC | Age: 49
End: 2019-12-16

## 2019-12-16 ENCOUNTER — MYC MEDICAL ADVICE (OUTPATIENT)
Dept: INTERNAL MEDICINE | Facility: CLINIC | Age: 49
End: 2019-12-16

## 2019-12-16 ENCOUNTER — TELEPHONE (OUTPATIENT)
Dept: SURGERY | Facility: CLINIC | Age: 49
End: 2019-12-16

## 2019-12-16 DIAGNOSIS — E44.0 MODERATE PROTEIN-CALORIE MALNUTRITION (H): Primary | ICD-10-CM

## 2019-12-16 NOTE — PROGRESS NOTES
Clinic Care Coordination Contact  [facility]/Voicemail       Clinical Data: Care Coordinator Outreach  Patient's spouse Rose (consent to communicate on file) left a voicemail for writer today inquiring where patient can go for infusion pain pumps and not inserted pain pumps, as patient was seen by [facility] today and was informed he did not meet criteria.  Outreach attempted x 1.  Left message on patient's voicemail with call back information and requested return call.  Plan:  Care Coordinator will try to reach patient again in approximately 10 business days.    Melissa Behl BSN, RN, PHN, CCM  Primary Care Clinical RN Care Coordinator  [facility]   [phone]

## 2019-12-16 NOTE — TELEPHONE ENCOUNTER
Talked to his wife who reports PICC just came out. Patient was sitting on the couch and PICC just came out. Will Let Dr Vyas know. Will work on getting order for new PICC to be placed at [facility]. Patient requires sedation per patient

## 2019-12-16 NOTE — TELEPHONE ENCOUNTER
Reason for Call:  Other     Detailed comments: Parker's wife Rose calls to discuss PICC line being reinserted or should he have a Cm placed at this time?    C2C on file with wife Rose    Phone Number Patient can be reached at: Home number on file [phone] (home)    Best Time: any    Can we leave a detailed message on this number? YES    Call taken on 12/16/2019 at 12:13 PM by Tanya Delacruz

## 2019-12-16 NOTE — TELEPHONE ENCOUNTER
[facility]    Phone Message    May a detailed message be left on voicemail: yes    Reason for Call: Order(s): Other:   Reason for requested: Reinsert PICC Line  Date needed: 12/16/19  Provider name: Dr. Lilliam Canales's PICC line was pulled out yesterday (12/15/19), and Thais would like to get orders as soon as possible to reinsert his PICC line since Parker is on TPN.       Action Taken: Message routed to:  [facility] ([facility]):  Surg

## 2019-12-17 ENCOUNTER — HOME INFUSION (PRE-WILLOW HOME INFUSION) (OUTPATIENT)
Dept: PHARMACY | Facility: CLINIC | Age: 49
End: 2019-12-17

## 2019-12-17 DIAGNOSIS — E44.0 MODERATE PROTEIN-CALORIE MALNUTRITION (H): ICD-10-CM

## 2019-12-18 ENCOUNTER — HOME INFUSION (PRE-WILLOW HOME INFUSION) (OUTPATIENT)
Dept: PHARMACY | Facility: CLINIC | Age: 49
End: 2019-12-18

## 2019-12-18 NOTE — PROGRESS NOTES
This is a recent snapshot of the patient's [facility] medical record.  For current drug dose and complete information and questions, call [phone] or In Basket pool, fv home infusion ([id number])  CSN Number:  [id number]

## 2019-12-19 ENCOUNTER — HOSPITAL ENCOUNTER (OUTPATIENT)
Facility: AMBULATORY SURGERY CENTER | Age: 49
End: 2019-12-19
Attending: PHYSICIAN ASSISTANT
Payer: COMMERCIAL

## 2019-12-19 ENCOUNTER — HOME INFUSION (PRE-WILLOW HOME INFUSION) (OUTPATIENT)
Dept: PHARMACY | Facility: CLINIC | Age: 49
End: 2019-12-19

## 2019-12-19 ENCOUNTER — ANCILLARY PROCEDURE (OUTPATIENT)
Dept: RADIOLOGY | Facility: AMBULATORY SURGERY CENTER | Age: 49
End: 2019-12-19
Attending: SURGERY
Payer: COMMERCIAL

## 2019-12-19 VITALS
HEART RATE: 57 BPM | SYSTOLIC BLOOD PRESSURE: 139 MMHG | DIASTOLIC BLOOD PRESSURE: 83 MMHG | WEIGHT: 191 LBS | HEIGHT: 71 IN | RESPIRATION RATE: 16 BRPM | OXYGEN SATURATION: 98 % | BODY MASS INDEX: 26.74 KG/M2 | TEMPERATURE: 97.6 F

## 2019-12-19 DIAGNOSIS — E44.0 MODERATE PROTEIN-CALORIE MALNUTRITION (H): Primary | ICD-10-CM

## 2019-12-19 DIAGNOSIS — E44.0 MODERATE PROTEIN-CALORIE MALNUTRITION (H): ICD-10-CM

## 2019-12-19 DEVICE — IMPLANTABLE DEVICE: Type: IMPLANTABLE DEVICE | Site: ARM | Status: FUNCTIONAL

## 2019-12-19 RX ORDER — DIMENHYDRINATE 50 MG/ML
25 INJECTION, SOLUTION INTRAMUSCULAR; INTRAVENOUS
Status: DISCONTINUED | OUTPATIENT
Start: 2019-12-19 | End: 2019-12-20 | Stop reason: HOSPADM

## 2019-12-19 RX ORDER — FENTANYL CITRATE 50 UG/ML
25-50 INJECTION, SOLUTION INTRAMUSCULAR; INTRAVENOUS
Status: DISCONTINUED | OUTPATIENT
Start: 2019-12-19 | End: 2019-12-20 | Stop reason: HOSPADM

## 2019-12-19 RX ORDER — HYDRALAZINE HYDROCHLORIDE 20 MG/ML
2.5-5 INJECTION INTRAMUSCULAR; INTRAVENOUS EVERY 10 MIN PRN
Status: DISCONTINUED | OUTPATIENT
Start: 2019-12-19 | End: 2019-12-20 | Stop reason: HOSPADM

## 2019-12-19 RX ORDER — SODIUM CHLORIDE, SODIUM LACTATE, POTASSIUM CHLORIDE, CALCIUM CHLORIDE 600; 310; 30; 20 MG/100ML; MG/100ML; MG/100ML; MG/100ML
INJECTION, SOLUTION INTRAVENOUS CONTINUOUS
Status: DISCONTINUED | OUTPATIENT
Start: 2019-12-19 | End: 2019-12-20 | Stop reason: HOSPADM

## 2019-12-19 RX ORDER — NALOXONE HYDROCHLORIDE 0.4 MG/ML
.1-.4 INJECTION, SOLUTION INTRAMUSCULAR; INTRAVENOUS; SUBCUTANEOUS
Status: DISCONTINUED | OUTPATIENT
Start: 2019-12-19 | End: 2019-12-20 | Stop reason: HOSPADM

## 2019-12-19 RX ORDER — METOPROLOL TARTRATE 1 MG/ML
1-2 INJECTION, SOLUTION INTRAVENOUS EVERY 5 MIN PRN
Status: DISCONTINUED | OUTPATIENT
Start: 2019-12-19 | End: 2019-12-20 | Stop reason: HOSPADM

## 2019-12-19 RX ORDER — HYDROMORPHONE HYDROCHLORIDE 1 MG/ML
.3-.5 INJECTION, SOLUTION INTRAMUSCULAR; INTRAVENOUS; SUBCUTANEOUS EVERY 10 MIN PRN
Status: DISCONTINUED | OUTPATIENT
Start: 2019-12-19 | End: 2019-12-20 | Stop reason: HOSPADM

## 2019-12-19 RX ORDER — HEPARIN SODIUM,PORCINE 10 UNIT/ML
VIAL (ML) INTRAVENOUS PRN
Status: DISCONTINUED | OUTPATIENT
Start: 2019-12-19 | End: 2019-12-19 | Stop reason: HOSPADM

## 2019-12-19 RX ORDER — ALBUTEROL SULFATE 0.83 MG/ML
2.5 SOLUTION RESPIRATORY (INHALATION) EVERY 4 HOURS PRN
Status: DISCONTINUED | OUTPATIENT
Start: 2019-12-19 | End: 2019-12-20 | Stop reason: HOSPADM

## 2019-12-19 RX ORDER — MEPERIDINE HYDROCHLORIDE 25 MG/ML
12.5 INJECTION INTRAMUSCULAR; INTRAVENOUS; SUBCUTANEOUS
Status: DISCONTINUED | OUTPATIENT
Start: 2019-12-19 | End: 2019-12-20 | Stop reason: HOSPADM

## 2019-12-19 RX ORDER — ONDANSETRON 2 MG/ML
4 INJECTION INTRAMUSCULAR; INTRAVENOUS EVERY 30 MIN PRN
Status: DISCONTINUED | OUTPATIENT
Start: 2019-12-19 | End: 2019-12-20 | Stop reason: HOSPADM

## 2019-12-19 RX ORDER — ONDANSETRON 4 MG/1
4 TABLET, ORALLY DISINTEGRATING ORAL EVERY 30 MIN PRN
Status: DISCONTINUED | OUTPATIENT
Start: 2019-12-19 | End: 2019-12-20 | Stop reason: HOSPADM

## 2019-12-19 ASSESSMENT — MIFFLIN-ST. JEOR: SCORE: 1763.5

## 2019-12-19 NOTE — PROGRESS NOTES
This is a recent snapshot of the patient's [facility] medical record.  For current drug dose and complete information and questions, call [phone] or In [facility] pool, fv home infusion ([id number])  CSN Number:  [id number]

## 2019-12-19 NOTE — PROGRESS NOTES
This is a recent snapshot of the patient's [facility] medical record.  For current drug dose and complete information and questions, call [phone] or In [facility] pool, fv home infusion ([id number])  CSN Number:  [id number]

## 2019-12-19 NOTE — PROCEDURES
Interventional Radiology Brief Post Procedure Note    Procedure: IR PICC Placement    Proceduralist: Per Dumont PA-C    Assistant: None    Time Out: Prior to the start of the procedure and with procedural staff participation, I verbally confirmed the patient s identity using two indicators, relevant allergies, that the procedure was appropriate and matched the consent or emergent situation, and that the correct equipment/implants were available. Immediately prior to starting the procedure I conducted the Time Out with the procedural staff and re-confirmed the patient s name, procedure, and site/side. (The Joint Commission universal protocol was followed.)  Yes    Sedation: None. Local Anesthestic used    Findings: Completed image-guided placement of 5 [place], 41.5 cm double lumen peripherally inserted central venous catheter via right medial brachial vein. Aspirates and flushes freely, heparin locked and ready for immediate use. Tip in high right atrium.        Estimated Blood Loss: Minimal    Fluoroscopy Time:  0.6 minute(s)    SPECIMENS: None    Complications: 1. None     Condition: Stable    Plan: Follow-up per primary team.     Comments: See dictated procedure note for full details.    Per Dumont PA-C

## 2019-12-19 NOTE — DISCHARGE INSTRUCTIONS
A collaboration between [facility] and [facility]  Experts in minimally invasive, targeted treatments performed using imaging guidance    Venous Access Device,  Port Catheter or Tunneled or Non-Tunneled Central Line Placement    Today you had a procedure today to install a venous access device; either a tunneled central vein catheter or a subcutaneous port catheter.    One of our Radiology PAs performed this procedure for you today:  ? Koko Enriquez PA-C  ? MOE August PA-C  ? Jhonatan Alejandro PA-C  ? Linda Queen PA-C   ? Per Dumont PA-C    After you go home:  - Drink plenty of fluids.  Generally 6-8 (8 ounce) glasses a day is recommended.  - Resume your regular diet unless otherwise ordered by a medical provider.  - Keep any applied tape/gauze dressings clean and dry.  Change tape/gauze dressings if they get wet or soiled.  - You may shower the following day after procedure, however cover and protect from moisture any tape/gauze dressings.  You may let water hit and run over dried skin glue, but do not scrub.  Pat the area dry after showering.  - Port placement incisions are closed with absorbable suture, meaning they do not need to be removed at a later date, and a topical skin adhesive (skin glue).  This glue will wear off in 7-14 days.  Do not remove before this time.  If 14 days have passed and residual glue is present, you may gently remove it.  - Do not apply gels, lotions, or ointments to the glue site for the first 10 days as this may cause the glue to prematurely soften and fail.  - Do not perform strenuous activities or lift greater than 10 pounds for the next three days.  - If there is bleeding or oozing from the procedure site, apply firm pressure to the area for 5-10 minutes.  If the bleeding continues seek medical advice at the numbers below.  - Mild procedure site discomfort can be treated with an ice pack and over-the-counter pain  relievers.        For 24 hours after any sedation used:  - Relax and take it easy.  No strenuous activities.  - Do not drive or operate machines at home or at work.  - No alcohol consumption.  - Do not make any important or legal decisions.    Call our Interventional Radiology (IR) service if:  - If you start bleeding from the procedure site.  If you do start to bleed from the site, lie down and hold some pressure on the site.  Our radiology provider can help you decide if you need to return to the hospital.  - If you have new or worsening pain related to the procedure.  - If you have concerning swelling at the procedure site.  - If you develop persistent nausea or vomiting.  - If you develop hives or a rash or any unexplained itching.  - If you have a fever of greater than 100.5  F and chills in the first 5 days after procedure.  - Any other concerns related to your procedure.      [facility]  Interventional Radiology (IR)  [address] [place] [address]    Contact Number:  [phone]  (IR control desk)  - Monday - Friday 8:00 am - 4:30 pm    After hours for urgent concerns:  [phone]  - After 4:30 pm Monday - Friday, Weekends and Holidays.   - Ask for Interventional Radiology on-call.  Someone is available 24 hours a day.  - [facility] toll free number:  [phone]

## 2019-12-20 NOTE — PROGRESS NOTES
This is a recent snapshot of the patient's [facility] medical record.  For current drug dose and complete information and questions, call [phone] or In [facility] pool, fv home infusion ([id number])  CSN Number:  [id number]

## 2019-12-24 ENCOUNTER — MYC REFILL (OUTPATIENT)
Dept: PALLIATIVE MEDICINE | Facility: CLINIC | Age: 49
End: 2019-12-24

## 2019-12-24 ENCOUNTER — HOME INFUSION (PRE-WILLOW HOME INFUSION) (OUTPATIENT)
Dept: PHARMACY | Facility: CLINIC | Age: 49
End: 2019-12-24

## 2019-12-24 ENCOUNTER — MYC REFILL (OUTPATIENT)
Dept: INTERNAL MEDICINE | Facility: CLINIC | Age: 49
End: 2019-12-24

## 2019-12-24 DIAGNOSIS — E43 SEVERE MALNUTRITION (H): ICD-10-CM

## 2019-12-24 DIAGNOSIS — R10.9 CHRONIC ABDOMINAL PAIN: ICD-10-CM

## 2019-12-24 DIAGNOSIS — F90.0 ADHD (ATTENTION DEFICIT HYPERACTIVITY DISORDER), INATTENTIVE TYPE: ICD-10-CM

## 2019-12-24 DIAGNOSIS — G89.29 CHRONIC ABDOMINAL PAIN: ICD-10-CM

## 2019-12-24 DIAGNOSIS — G89.4 CHRONIC PAIN SYNDROME: ICD-10-CM

## 2019-12-24 RX ORDER — DEXTROAMPHETAMINE SACCHARATE, AMPHETAMINE ASPARTATE, DEXTROAMPHETAMINE SULFATE AND AMPHETAMINE SULFATE 5; 5; 5; 5 MG/1; MG/1; MG/1; MG/1
20 TABLET ORAL DAILY
Qty: 30 TABLET | Refills: 0 | OUTPATIENT
Start: 2019-12-24

## 2019-12-24 RX ORDER — LORAZEPAM 1 MG/1
TABLET ORAL
Qty: 30 TABLET | Refills: 0 | Status: SHIPPED | OUTPATIENT
Start: 2019-12-24 | End: 2020-01-01

## 2019-12-24 RX ORDER — FENTANYL 25 UG/1
1 PATCH TRANSDERMAL
Qty: 15 PATCH | Refills: 0 | Status: CANCELLED | OUTPATIENT
Start: 2019-12-24

## 2019-12-24 NOTE — TELEPHONE ENCOUNTER
Stoney message from patient on 12/24 at 1019:      Parker Acevedo would like a refill of the following medications:         fentaNYL (DURAGESIC) 25 mcg/hr 72 hr patch [Patti Milligan MD]     Preferred pharmacy: [facility] [place] - [address]   --------------    Will route to MA pool for assistance with gathering opioid refill information.    YADIRA CrookN, RN-BC  Patient Care Supervisor/Care Coordinator  [facility]

## 2019-12-24 NOTE — TELEPHONE ENCOUNTER
Adderall 20 MG       Last Written Prescription Date:  12/6/19  Last Fill Quantity: 30,   # refills: 0  Last Office Visit: 11/25/19  Future Office visit:    Next 5 appointments (look out 90 days)    Feb 12, 2020  1:15 PM CST  Return Visit with Patti Milligan MD  [facility] ([facility]) [address]  [place] [address]  [phone]           Routing refill request to provider for review/approval because:  Drug not on the FMG, [facility]MP or [facility] refill protocol or controlled substance  Lorazepam  1 MG      Last Written Prescription Date:  11/25/19  Last Fill Quantity: 30,   # refills: 0  Last Office Visit: 11/25/19  Future Office visit:    Next 5 appointments (look out 90 days)    Feb 12, 2020  1:15 PM CST  Return Visit with Patti Milligan MD  [facility] ([facility]) [address]  [place] [address]  [phone]           Routing refill request to provider for review/approval because:  Drug not on the FMG, [facility] or [facility] refill protocol or controlled substance

## 2019-12-26 ENCOUNTER — HOSPITAL ENCOUNTER (OUTPATIENT)
Dept: LAB | Facility: CLINIC | Age: 49
Discharge: HOME | End: 2019-12-26
Attending: SURGERY | Admitting: SURGERY
Payer: COMMERCIAL

## 2019-12-26 LAB
ANION GAP SERPL CALCULATED.3IONS-SCNC: 2 MMOL/L (ref 3–14)
BUN SERPL-MCNC: 14 MG/DL (ref 7–30)
CALCIUM SERPL-MCNC: 8.1 MG/DL (ref 8.5–10.1)
CHLORIDE SERPL-SCNC: 110 MMOL/L (ref 94–109)
CO2 SERPL-SCNC: 30 MMOL/L (ref 20–32)
CREAT SERPL-MCNC: 0.76 MG/DL (ref 0.66–1.25)
GFR SERPL CREATININE-BSD FRML MDRD: >90 ML/MIN/{1.73_M2}
GLUCOSE SERPL-MCNC: 101 MG/DL (ref 70–99)
MAGNESIUM SERPL-MCNC: 1.9 MG/DL (ref 1.6–2.3)
PHOSPHATE SERPL-MCNC: 3.1 MG/DL (ref 2.5–4.5)
POTASSIUM SERPL-SCNC: 3.7 MMOL/L (ref 3.4–5.3)
SODIUM SERPL-SCNC: 142 MMOL/L (ref 133–144)

## 2019-12-26 PROCEDURE — 80048 BASIC METABOLIC PNL TOTAL CA: CPT | Performed by: SURGERY

## 2019-12-26 PROCEDURE — 83735 ASSAY OF MAGNESIUM: CPT | Performed by: SURGERY

## 2019-12-26 PROCEDURE — 84100 ASSAY OF PHOSPHORUS: CPT | Performed by: SURGERY

## 2019-12-26 RX ORDER — FENTANYL 25 UG/1
1 PATCH TRANSDERMAL
Qty: 15 PATCH | Refills: 0 | Status: SHIPPED | OUTPATIENT
Start: 2019-12-26 | End: 2020-01-23

## 2019-12-26 NOTE — TELEPHONE ENCOUNTER
Received call from patient requesting refill(s) of   fentaNYL (DURAGESIC) 25 mcg/hr 72 hr patch      Last dispensed from pharmacy on 12/05/19    Pt last seen by prescribing provider on 11/13/19  Next appt scheduled for 02/12/2020     checked in the past 6 months? Yes If no, print current report and give to RN    Last urine drug screen date 09/11/19  Current opioid agreement on file (completed within the last year) Yes Date of opioid agreement: 09/11/19      E-prescribe to   [facility] [place], [place] - [address] [place] [address]  Phone: [phone] Fax: [phone]    Will route to nursing pool for review and preparation of prescription(s).         Samaria De Los Santos, [facility]   Pain Management Center  December 26, 2019

## 2019-12-26 NOTE — PROGRESS NOTES
This is a recent snapshot of the patient's [facility] medical record.  For current drug dose and complete information and questions, call [phone] or In Basket pool, fv home infusion ([id number])  CSN Number:  [id number]

## 2019-12-26 NOTE — TELEPHONE ENCOUNTER
Script Eprescribed to pharmacy    Will send this to MA team to notify patient.    Signed Prescriptions:                        Disp   Refills    fentaNYL (DURAGESIC) 25 mcg/hr 72 hr patch 15 pat*0        Sig: Place 1 patch onto the skin every 48 hours remove old           patch.   May fill 1/3/20 and start 1/6/20  Authorizing Provider: BRANDYN JENKINS MD  [facility]

## 2019-12-26 NOTE — TELEPHONE ENCOUNTER
Medication refill information reviewed.     Last due:  May fill 12/5/19 and start 12/7/19    Due date:  1/6/20    Weaning instructions:  N/A    Prescriptions prepped for review.     Demetrice Yeh RN-BSN  [facility]

## 2019-12-27 ENCOUNTER — HOME INFUSION (PRE-WILLOW HOME INFUSION) (OUTPATIENT)
Dept: PHARMACY | Facility: CLINIC | Age: 49
End: 2019-12-27

## 2020-01-01 ENCOUNTER — MYC REFILL (OUTPATIENT)
Dept: INTERNAL MEDICINE | Facility: CLINIC | Age: 50
End: 2020-01-01

## 2020-01-01 DIAGNOSIS — F90.0 ADHD (ATTENTION DEFICIT HYPERACTIVITY DISORDER), INATTENTIVE TYPE: ICD-10-CM

## 2020-01-01 DIAGNOSIS — R11.0 NAUSEA: ICD-10-CM

## 2020-01-02 ENCOUNTER — PATIENT OUTREACH (OUTPATIENT)
Dept: CARE COORDINATION | Facility: CLINIC | Age: 50
End: 2020-01-02

## 2020-01-02 ENCOUNTER — MYC MEDICAL ADVICE (OUTPATIENT)
Dept: INTERNAL MEDICINE | Facility: CLINIC | Age: 50
End: 2020-01-02

## 2020-01-02 ENCOUNTER — MYC REFILL (OUTPATIENT)
Dept: INTERNAL MEDICINE | Facility: CLINIC | Age: 50
End: 2020-01-02

## 2020-01-02 DIAGNOSIS — Z53.9 DIAGNOSIS NOT YET DEFINED: Primary | ICD-10-CM

## 2020-01-02 DIAGNOSIS — Z98.84 GASTRIC BYPASS STATUS FOR OBESITY: ICD-10-CM

## 2020-01-02 PROCEDURE — G0179 MD RECERTIFICATION HHA PT: HCPCS | Performed by: INTERNAL MEDICINE

## 2020-01-02 RX ORDER — ONDANSETRON 8 MG/1
TABLET, ORALLY DISINTEGRATING ORAL
Qty: 90 TABLET | Refills: 1 | Status: SHIPPED | OUTPATIENT
Start: 2020-01-02 | End: 2020-05-04

## 2020-01-02 RX ORDER — DEXTROAMPHETAMINE SACCHARATE, AMPHETAMINE ASPARTATE, DEXTROAMPHETAMINE SULFATE AND AMPHETAMINE SULFATE 5; 5; 5; 5 MG/1; MG/1; MG/1; MG/1
20 TABLET ORAL DAILY
Qty: 30 TABLET | Refills: 0 | Status: SHIPPED | OUTPATIENT
Start: 2020-01-02 | End: 2020-01-29

## 2020-01-02 RX ORDER — LORAZEPAM 1 MG/1
TABLET ORAL
Qty: 30 TABLET | Refills: 0 | Status: SHIPPED | OUTPATIENT
Start: 2020-01-02 | End: 2020-03-02

## 2020-01-02 NOTE — TELEPHONE ENCOUNTER
Lidocaine, alum, mag hydroxide-simethicone GI cocktail       Last Written Prescription Date:  11/15/19  Last Fill Quantity: 1 bottle,   # refills: 1  Last Office Visit: 11/25/19  Future Office visit:    Next 5 appointments (look out 90 days)    Feb 12, 2020  1:15 PM CST  Return Visit with Patti Milligan MD  [facility] [place] ([place] [facility] [place]) [address]  [place] [address] [phone]           Routing refill request to provider for review/approval because:  Drug not on the FMG, UMP or  Health refill protocol or controlled substance

## 2020-01-02 NOTE — PROGRESS NOTES
Clinic Care Coordination Contact    Follow Up Progress Note      Assessment: RN CC spoke with patient's spouse Rose (consent to communicate on file).  Patient has remained free of infection.  He currently has a power picc in place.  He is awaiting the okay for a replacement of his Cm.    Patient and spouse have not followed through on scheduling appointments with cardiology or surgery, but state they will do this.  Patient had an appointment with [facility] to explore the option of an external pain pump.  Rose states she was informed they do not perform/authorize external pain pumps and patient was too high risk due to his frequency of infections for an internal pain pumps.  Patient was offered medical cannabis.  Rose states they would like to avoid this if possible.  Rose inquires if there are other providers who will authorize external pain pumps.  RN CC spoke with [facility] who states they do not perform or recommend pain pumps.  RN CC spoke with [facility] who states patient would need to find a provider who would authorize an external pain pump and this is typically reserved for patients on hospice.  CHRISTY JERRY informed Rose of this information who plans on scheduling appointments with PCP, pain clinic and general surgery to discuss patient's pain management care.    Goals addressed this encounter:   Goals Addressed                 This Visit's Progress      COMPLETED: #1 Medical (pt-stated)        Goal Statement: I want to be free of infection.  Measure of Success: Patient will remain free of any infections.  Supportive Steps to Achieve: New PICC line placed, home infusion services, follow up appointments as instructed, washing bathtub/shower with bleach prior to bathing  Barriers: chronic TPN  Strengths: care coordination, home infusion services  Date to Achieve By: 1/1/20  Patient expressed understanding of goal: yes           #1 Medical (pt-stated)   Not on  track     Goal Statement: I will see cardiology.  Measure of Success: Appointment will be scheduled and attended.  Supportive Steps to Achieve: Review of hospital discharge instructions.  Barriers: multiple specialists and appointments  Strengths: care coordination, supportive spouse  Date to Achieve By: 2/1/20  Patient expressed understanding of goal: yes           #2 Medical (pt-stated)   Not on track     Goal Statement: I will see surgery as instructed.  Measure of Success: Appointment will be scheduled and attended.  Supportive Steps to Achieve: Review of hospital discharge instructions.  Barriers: multiple specialists and discharge instructions.  Strengths: care coordination, supportive spouse  Date to Achieve By: 2/1/20  Patient expressed understanding of goal: yes                Intervention/Education provided during outreach: RN CC reviewed patient's plan of care, importance of timeliness of appointments.     Outreach Frequency: monthly    Plan:   1. Rose will schedule cardiology, PCP, pain clinic and surgery clinic appointments for patient.  2. RN Care Coordinator will follow up in 1 month.    Melissa Behl BSN, RN, PHN, [facility]  Primary Care Clinical RN Care Coordinator  [facility]   [phone]

## 2020-01-02 NOTE — TELEPHONE ENCOUNTER
Lorazepam 1 MG       Last Written Prescription Date:  12/4/19  Last Fill Quantity: 30,   # refills: 0  Last Office Visit: 11/25/19  Future Office visit:    Next 5 appointments (look out 90 days)    Feb 12, 2020  1:15 PM CST  Return Visit with Patti Milligan MD  [facility] ([place] [facility]) [address] [facility] [address]  [phone]           Routing refill request to provider for review/approval because:  Drug not on the FMG, [facility] or [facility] refill protocol or controlled substance  Adderall   20 MG     Last Written Prescription Date:  12/6/19  Last Fill Quantity: 30,   # refills: 0  Last Office Visit: 11/25/19  Future Office visit:    Next 5 appointments (look out 90 days)    Feb 12, 2020  1:15 PM CST  Return Visit with Patti Milligan MD  [facility] ([facility]) [address]  [place] [address]  [phone]           Routing refill request to provider for review/approval because:  Drug not on the FMG, [facility] or [facility] refill protocol or controlled substance

## 2020-01-03 DIAGNOSIS — R10.9 CHRONIC ABDOMINAL PAIN: ICD-10-CM

## 2020-01-03 DIAGNOSIS — Z79.891 ENCOUNTER FOR LONG-TERM OPIATE ANALGESIC USE: ICD-10-CM

## 2020-01-03 DIAGNOSIS — G89.29 CHRONIC ABDOMINAL PAIN: ICD-10-CM

## 2020-01-03 RX ORDER — OXYCODONE HCL 5 MG/5 ML
5-10 SOLUTION, ORAL ORAL 2 TIMES DAILY PRN
Qty: 450 ML | Refills: 0 | Status: SHIPPED | OUTPATIENT
Start: 2020-01-03 | End: 2020-02-07

## 2020-01-03 NOTE — TELEPHONE ENCOUNTER
Medication refill information reviewed.     Due date for oxyCODONE (ROXICODONE) 5 MG/5ML solution  is 1/13/20     Prescriptions prepped for review.     Will route to provider.

## 2020-01-03 NOTE — TELEPHONE ENCOUNTER
Received fax from pharmacy requesting refill(s) of oxyCODONE (ROXICODONE) 5 MG/5ML solution     Last picked up from pharmacy on 12/13/19    Pt last seen by prescribing provider on 11/13/19    Next appt scheduled for 02/12/20     checked in the past 6 months? Yes If no, print current report and give to RN    Last urine drug screen date 09/11/19  Current opioid agreement on file (completed within the last year) Yes Date of opioid agreement: 09/11/19    Processing (pick one and delete the others):      E-prescribe to     [facility] [place], [place] - [address] [facility]  [address] [facility] [address]  Phone: [phone] Fax: [phone]    Will route to nursing pool for review and preparation of prescription(s).     --  Pharmacy staff confirmed last sale date is the same as last filled, and patient requested it earlier because she stated she was thinking it may need a PA.      Sahara Farrell [facility]  [place]

## 2020-01-03 NOTE — PROGRESS NOTES
This is a recent snapshot of the patient's [facility] medical record.  For current drug dose and complete information and questions, call [phone] or In Basket pool, fv home infusion ([id number])  CSN Number:  [id number]

## 2020-01-03 NOTE — TELEPHONE ENCOUNTER
Script Eprescribed to pharmacy    Will send this to MA team to notify patient.    Signed Prescriptions:                        Disp   Refills    oxyCODONE (ROXICODONE) 5 MG/5ML solution   450 mL 0        Sig: Take 5-10 mLs (5-10 mg) by mouth 2 times daily as           needed for pain Max of 15mg/day. 30 day supply.           May fill on 1/12/20 to start on 1/13/20  Authorizing Provider: BRANDYN JENKINS MD  [facility]

## 2020-01-12 ENCOUNTER — MYC REFILL (OUTPATIENT)
Dept: CARE COORDINATION | Facility: CLINIC | Age: 50
End: 2020-01-12

## 2020-01-12 DIAGNOSIS — Z98.84 S/P BARIATRIC SURGERY: ICD-10-CM

## 2020-01-13 ENCOUNTER — TELEPHONE (OUTPATIENT)
Dept: INTERNAL MEDICINE | Facility: CLINIC | Age: 50
End: 2020-01-13

## 2020-01-13 RX ORDER — PANTOPRAZOLE SODIUM 40 MG/1
40 TABLET, DELAYED RELEASE ORAL DAILY
Qty: 30 TABLET | Refills: 8 | Status: SHIPPED | OUTPATIENT
Start: 2020-01-13 | End: 2020-08-30

## 2020-01-13 NOTE — TELEPHONE ENCOUNTER
"  Requested Prescriptions   Pending Prescriptions Disp Refills     pantoprazole (PROTONIX) 40 MG EC tablet 30 tablet 3     Sig: Take 1 tablet (40 mg) by mouth daily   Last Written Prescription Date:  10/8/2019  Last Fill Quantity: 30,  # refills: 3   Last office visit: 11/25/2019  Future Office Visit:   Next 5 appointments (look out 90 days)    Feb 07, 2020 11:30 AM CST  Office Visit with Chuy Isaac MD  [facility] ([facility]) [address] [facility] [address]  [phone]   Feb 12, 2020  1:15 PM CST  Return Visit with Patti Milligan MD  [facility] ([facility]) [address] [place] [address] [phone]             PPI Protocol Passed - 1/13/2020  8:00 AM        Passed - Not on Clopidogrel (unless Pantoprazole ordered)        Passed - No diagnosis of osteoporosis on record        Passed - Recent (12 mo) or future (30 days) visit within the authorizing provider's specialty     Patient has had an office visit with the authorizing provider or a provider within the authorizing providers department within the previous 12 mos or has a future within next 30 days. See \"Patient Info\" tab in inbasket, or \"Choose Columns\" in Meds & Orders section of the refill encounter.              Passed - Medication is active on med list        Passed - Patient is age 18 or older      Prescription approved per [facility] Refill Protocol.  Ella Hammond RN      "

## 2020-01-13 NOTE — TELEPHONE ENCOUNTER
Reason for Call:  Form, our goal is to have forms completed with 72 hours, however, some forms may require a visit or additional information.    Type of letter, form or note:  disability    Who is the form from?: Patient    Where did the form come from: Patient or family brought in       What clinic location was the form placed at?: [facility]    Where the form was placed: silvia pruett Box/Folder    What number is listed as a contact on the form?: [phone]       Additional comments: patient would like form mailed to his home address when completed.     Call taken on 1/13/2020 at 2:52 PM by Paresh Mooney

## 2020-01-14 ENCOUNTER — MEDICAL CORRESPONDENCE (OUTPATIENT)
Dept: HEALTH INFORMATION MANAGEMENT | Facility: CLINIC | Age: 50
End: 2020-01-14

## 2020-01-14 ENCOUNTER — HOME INFUSION (PRE-WILLOW HOME INFUSION) (OUTPATIENT)
Dept: PHARMACY | Facility: CLINIC | Age: 50
End: 2020-01-14

## 2020-01-14 LAB
ALBUMIN SERPL-MCNC: 3.3 G/DL (ref 3.4–5)
ALP SERPL-CCNC: 76 U/L (ref 40–150)
ALT SERPL W P-5'-P-CCNC: 20 U/L (ref 0–70)
ANION GAP SERPL CALCULATED.3IONS-SCNC: 4 MMOL/L (ref 3–14)
AST SERPL W P-5'-P-CCNC: 14 U/L (ref 0–45)
BASOPHILS # BLD AUTO: 0 10E9/L (ref 0–0.2)
BASOPHILS NFR BLD AUTO: 0.5 %
BILIRUB SERPL-MCNC: 0.2 MG/DL (ref 0.2–1.3)
BUN SERPL-MCNC: 15 MG/DL (ref 7–30)
CALCIUM SERPL-MCNC: 8.4 MG/DL (ref 8.5–10.1)
CHLORIDE SERPL-SCNC: 111 MMOL/L (ref 94–109)
CO2 SERPL-SCNC: 29 MMOL/L (ref 20–32)
CREAT SERPL-MCNC: 0.74 MG/DL (ref 0.66–1.25)
DIFFERENTIAL METHOD BLD: ABNORMAL
EOSINOPHIL NFR BLD AUTO: 3.6 %
ERYTHROCYTE [DISTWIDTH] IN BLOOD BY AUTOMATED COUNT: 13.6 % (ref 10–15)
GFR SERPL CREATININE-BSD FRML MDRD: >90 ML/MIN/{1.73_M2}
GLUCOSE SERPL-MCNC: 78 MG/DL (ref 70–99)
HCT VFR BLD AUTO: 36.1 % (ref 40–53)
HGB BLD-MCNC: 11.6 G/DL (ref 13.3–17.7)
IMM GRANULOCYTES # BLD: 0 10E9/L (ref 0–0.4)
IMM GRANULOCYTES NFR BLD: 0.3 %
LYMPHOCYTES # BLD AUTO: 2.1 10E9/L (ref 0.8–5.3)
LYMPHOCYTES NFR BLD AUTO: 26.5 %
MAGNESIUM SERPL-MCNC: 2.2 MG/DL (ref 1.6–2.3)
MCH RBC QN AUTO: 30.5 PG (ref 26.5–33)
MCHC RBC AUTO-ENTMCNC: 32.1 G/DL (ref 31.5–36.5)
MCV RBC AUTO: 95 FL (ref 78–100)
MONOCYTES # BLD AUTO: 0.9 10E9/L (ref 0–1.3)
MONOCYTES NFR BLD AUTO: 11.9 %
NEUTROPHILS # BLD AUTO: 4.5 10E9/L (ref 1.6–8.3)
NEUTROPHILS NFR BLD AUTO: 57.2 %
NRBC # BLD AUTO: 0 10*3/UL
NRBC BLD AUTO-RTO: 0 /100
PHOSPHATE SERPL-MCNC: 3.8 MG/DL (ref 2.5–4.5)
PLATELET # BLD AUTO: 190 10E9/L (ref 150–450)
POTASSIUM SERPL-SCNC: 3.9 MMOL/L (ref 3.4–5.3)
PROT SERPL-MCNC: 6.9 G/DL (ref 6.8–8.8)
RBC # BLD AUTO: 3.8 10E12/L (ref 4.4–5.9)
SODIUM SERPL-SCNC: 144 MMOL/L (ref 133–144)
TRIGL SERPL-MCNC: 87 MG/DL
WBC # BLD AUTO: 7.8 10E9/L (ref 4–11)

## 2020-01-14 PROCEDURE — 84478 ASSAY OF TRIGLYCERIDES: CPT | Performed by: SURGERY

## 2020-01-14 PROCEDURE — 85025 COMPLETE CBC W/AUTO DIFF WBC: CPT | Performed by: SURGERY

## 2020-01-14 PROCEDURE — 80053 COMPREHEN METABOLIC PANEL: CPT | Performed by: SURGERY

## 2020-01-14 PROCEDURE — 83735 ASSAY OF MAGNESIUM: CPT | Performed by: SURGERY

## 2020-01-14 PROCEDURE — 84100 ASSAY OF PHOSPHORUS: CPT | Performed by: SURGERY

## 2020-01-14 NOTE — TELEPHONE ENCOUNTER
Still not able to get thru to patient on # listed. Patient needs to complete his part of the form/

## 2020-01-14 NOTE — TELEPHONE ENCOUNTER
When I dial the number it says this call can not be completed. When I gilbert it with a 1 in front of it, it just goes to busy signal.

## 2020-01-15 ENCOUNTER — HOME INFUSION (PRE-WILLOW HOME INFUSION) (OUTPATIENT)
Dept: PHARMACY | Facility: CLINIC | Age: 50
End: 2020-01-15

## 2020-01-15 NOTE — TELEPHONE ENCOUNTER
Form encounter states mail to patient. Patient wrote on the envelope to complete and fax it in. We can't fax it in because the patient did not fill out his part.

## 2020-01-15 NOTE — TELEPHONE ENCOUNTER
Please put at  for . His wife Rose will come pick it up.   Thank you,  Jyothi Perez- Patient Representative

## 2020-01-15 NOTE — TELEPHONE ENCOUNTER
Tried to reach patient, left message for patient to call the clinic back.    Pratibha Ferris, [facility]

## 2020-01-15 NOTE — TELEPHONE ENCOUNTER
Tried to reach patient, still unable to get thru to patient on the Number listed.     Pratibha Ferris, CMA

## 2020-01-16 NOTE — PROGRESS NOTES
This is a recent snapshot of the patient's [facility] medical record.  For current drug dose and complete information and questions, call [phone] or In Basket pool, fv home infusion ([id number])  CSN Number:  [id number]

## 2020-01-22 ENCOUNTER — MYC REFILL (OUTPATIENT)
Dept: FAMILY MEDICINE | Facility: CLINIC | Age: 50
End: 2020-01-22

## 2020-01-22 ENCOUNTER — MYC REFILL (OUTPATIENT)
Dept: PALLIATIVE MEDICINE | Facility: CLINIC | Age: 50
End: 2020-01-22

## 2020-01-22 DIAGNOSIS — E43 SEVERE MALNUTRITION (H): ICD-10-CM

## 2020-01-22 DIAGNOSIS — F90.0 ADHD (ATTENTION DEFICIT HYPERACTIVITY DISORDER), INATTENTIVE TYPE: ICD-10-CM

## 2020-01-22 DIAGNOSIS — E53.8 VITAMIN B12 DEFICIENCY (NON ANEMIC): ICD-10-CM

## 2020-01-22 DIAGNOSIS — R10.9 CHRONIC ABDOMINAL PAIN: ICD-10-CM

## 2020-01-22 DIAGNOSIS — Z98.84 BARIATRIC SURGERY STATUS: ICD-10-CM

## 2020-01-22 DIAGNOSIS — G89.4 CHRONIC PAIN SYNDROME: ICD-10-CM

## 2020-01-22 DIAGNOSIS — G89.29 CHRONIC ABDOMINAL PAIN: ICD-10-CM

## 2020-01-22 DIAGNOSIS — R11.0 NAUSEA: ICD-10-CM

## 2020-01-22 RX ORDER — FENTANYL 25 UG/1
1 PATCH TRANSDERMAL
Qty: 15 PATCH | Refills: 0 | Status: CANCELLED | OUTPATIENT
Start: 2020-01-22

## 2020-01-22 RX ORDER — CYANOCOBALAMIN 1000 UG/ML
1 INJECTION, SOLUTION INTRAMUSCULAR; SUBCUTANEOUS
Qty: 1 ML | Refills: 11 | Status: CANCELLED | OUTPATIENT
Start: 2020-01-22

## 2020-01-23 RX ORDER — FENTANYL 25 UG/1
1 PATCH TRANSDERMAL
Qty: 15 PATCH | Refills: 0 | Status: SHIPPED | OUTPATIENT
Start: 2020-01-23 | End: 2020-02-25

## 2020-01-23 NOTE — TELEPHONE ENCOUNTER
Script Eprescribed to pharmacy    Will send this to MA team to notify patient.    Signed Prescriptions:                        Disp   Refills    fentaNYL (DURAGESIC) 25 mcg/hr 72 hr patch 15 pat*0        Sig: Place 1 patch onto the skin every 48 hours remove old           patch.   May fill 2/3/20 and start 2/5/20  Authorizing Provider: BRANDYN JENKINS MD  [facility]

## 2020-01-23 NOTE — TELEPHONE ENCOUNTER
Medication refill information reviewed.     Due date for fentaNYL (DURAGESIC) 25 mcg/hr 72 hr patch is 2/5/20     Prescriptions prepped for review.     Will route to provider.

## 2020-01-23 NOTE — TELEPHONE ENCOUNTER
cyanocobalamin (CYANOCOBALAMIN) 1000 MCG/ML injection 1 mL 11 8/5/2019  No   Sig - Route: Inject 1 mL (1,000 mcg) into the muscle every 30 days - Intramuscular   Sent to pharmacy as: cyanocobalamin (CYANOCOBALAMIN) 1000 MCG/ML injection   Class: E-Prescribe   Order: [id number]   E-Prescribing Status: Receipt confirmed by pharmacy (8/5/2019  5:10 PM CDT)

## 2020-01-23 NOTE — TELEPHONE ENCOUNTER
Received call from patient requesting refill(s) of fentaNYL (DURAGESIC) 25 mcg/hr 72 hr patch    Last dispensed from pharmacy on 01/03/20    Pt last seen by prescribing provider on 11/13/19  Next appt scheduled for 02/12/20     checked in the past 6 months? Yes If no, print current report and give to RN    Last urine drug screen date 09/11/19  Current opioid agreement on file (completed within the last year) Yes Date of opioid agreement: 09/11/19    Processing (pick one and delete the others):      E-prescribe to pharmacy-[facility] - [place], [place] - [address]      Will route to nursing pool for review and preparation of prescription(s).

## 2020-01-24 RX ORDER — LORAZEPAM 1 MG/1
TABLET ORAL
Qty: 30 TABLET | Refills: 0 | Status: CANCELLED | OUTPATIENT
Start: 2020-01-24

## 2020-01-24 RX ORDER — DEXTROAMPHETAMINE SACCHARATE, AMPHETAMINE ASPARTATE, DEXTROAMPHETAMINE SULFATE AND AMPHETAMINE SULFATE 5; 5; 5; 5 MG/1; MG/1; MG/1; MG/1
20 TABLET ORAL DAILY
Qty: 30 TABLET | Refills: 0 | Status: CANCELLED | OUTPATIENT
Start: 2020-01-24

## 2020-01-24 RX ORDER — ONDANSETRON 8 MG/1
TABLET, ORALLY DISINTEGRATING ORAL
Qty: 90 TABLET | Refills: 1 | Status: CANCELLED | OUTPATIENT
Start: 2020-01-24

## 2020-01-24 NOTE — TELEPHONE ENCOUNTER
"BD pen needles 27G 1/2\"  Prescription was sent 9/26/19 for #3 9 ONE Needle every 30 days) with 4 refills.  Pharmacy notified via E-Prescribe refusal.     Adderall 20 mg  Last Written Prescription Date:  1/2/20  Last Fill Quantity: 30,  # refills: 0   Last office visit: 11/25/19 with Dr. Isaac  Future Office Visit:   Next 5 appointments (look out 90 days)    Feb 07, 2020 11:30 AM CST  Office Visit with Chuy Isaac MD  [facility] ([address] [address]  [phone]   Feb 12, 2020  1:15 PM CST  Return Visit with Patti Milligan MD  [facility] ([place] [facility] [address] [facility] [address] [phone]         Zofran  ODT 8 mg  Prescription was sent 1/2/2090 for #90 ( one tablet every 8 hours prn)  with 1 refills.  Pharmacy notified via E-Prescribe refusal. - should have enough to get to march 2.    Ativan 1 mg  Last Written Prescription Date:  1/2/20  Last Fill Quantity: 30,  # refills: 0   Last office visit:  11/25/19   With Dr. Isaac.    Future Office Visit:   Next 5 appointments (look out 90 days)    Feb 07, 2020 11:30 AM CST  Office Visit with Chuy Isaac MD  [facility] ([address] [address] [phone]   Feb 12, 2020  1:15 PM CST  Return Visit with Patti Milligan MD  [facility] ([place] [facility]) [address] [facility] [address] [phone]       Forwarding Adderall and Ativan RX  to Primary Care Provider as out of RN scope of practice when NO on med list........... CHRISTY Hurst.            "

## 2020-01-29 ENCOUNTER — MYC MEDICAL ADVICE (OUTPATIENT)
Dept: PALLIATIVE MEDICINE | Facility: CLINIC | Age: 50
End: 2020-01-29

## 2020-01-29 ENCOUNTER — HOME INFUSION (PRE-WILLOW HOME INFUSION) (OUTPATIENT)
Dept: PHARMACY | Facility: CLINIC | Age: 50
End: 2020-01-29

## 2020-01-29 DIAGNOSIS — F90.0 ADHD (ATTENTION DEFICIT HYPERACTIVITY DISORDER), INATTENTIVE TYPE: ICD-10-CM

## 2020-01-29 RX ORDER — DEXTROAMPHETAMINE SACCHARATE, AMPHETAMINE ASPARTATE, DEXTROAMPHETAMINE SULFATE, AND AMPHETAMINE SULFATE 5; 5; 5; 5 MG/1; MG/1; MG/1; MG/1
TABLET ORAL
Qty: 30 TABLET | Refills: 0 | Status: SHIPPED | OUTPATIENT
Start: 2020-01-29 | End: 2020-03-02

## 2020-01-29 NOTE — TELEPHONE ENCOUNTER
Adderall   Last Written Prescription Date:  1/2/2020  Last Fill Quantity: 30,   # refills: 0  Last Office Visit: 11/25/19 [place]  Future Office visit:    Next 5 appointments (look out 90 days)    Feb 07, 2020 11:30 AM CST  Office Visit with Chuy Isaac MD  [facility] ([address] [address]  [phone]   Feb 12, 2020  1:15 PM CST  Return Visit with Patti Milligan MD  [facility] ([facility]) [address] [facility] [address]  [phone]           Routing refill request to provider for review/approval because:  Drug not on the FMG, UMP or  Health refill protocol or controlled substance    Genesis Ardon RN on 1/29/2020 at 1:27 PM

## 2020-01-30 ENCOUNTER — TELEPHONE (OUTPATIENT)
Dept: PALLIATIVE MEDICINE | Facility: CLINIC | Age: 50
End: 2020-01-30

## 2020-01-30 ENCOUNTER — PATIENT OUTREACH (OUTPATIENT)
Dept: CARE COORDINATION | Facility: CLINIC | Age: 50
End: 2020-01-30

## 2020-01-30 SDOH — ECONOMIC STABILITY: FOOD INSECURITY: WITHIN THE PAST 12 MONTHS, THE FOOD YOU BOUGHT JUST DIDN'T LAST AND YOU DIDN'T HAVE MONEY TO GET MORE.: NEVER TRUE

## 2020-01-30 SDOH — HEALTH STABILITY: PHYSICAL HEALTH: ON AVERAGE, HOW MANY MINUTES DO YOU ENGAGE IN EXERCISE AT THIS LEVEL?: 10 MIN

## 2020-01-30 SDOH — ECONOMIC STABILITY: FOOD INSECURITY: WITHIN THE PAST 12 MONTHS, YOU WORRIED THAT YOUR FOOD WOULD RUN OUT BEFORE YOU GOT MONEY TO BUY MORE.: NEVER TRUE

## 2020-01-30 SDOH — ECONOMIC STABILITY: INCOME INSECURITY: HOW HARD IS IT FOR YOU TO PAY FOR THE VERY BASICS LIKE FOOD, HOUSING, MEDICAL CARE, AND HEATING?: NOT HARD AT ALL

## 2020-01-30 SDOH — ECONOMIC STABILITY: TRANSPORTATION INSECURITY
IN THE PAST 12 MONTHS, HAS THE LACK OF TRANSPORTATION KEPT YOU FROM MEDICAL APPOINTMENTS OR FROM GETTING MEDICATIONS?: NO

## 2020-01-30 SDOH — ECONOMIC STABILITY: TRANSPORTATION INSECURITY
IN THE PAST 12 MONTHS, HAS LACK OF TRANSPORTATION KEPT YOU FROM MEETINGS, WORK, OR FROM GETTING THINGS NEEDED FOR DAILY LIVING?: NO

## 2020-01-30 SDOH — HEALTH STABILITY: PHYSICAL HEALTH: ON AVERAGE, HOW MANY DAYS PER WEEK DO YOU ENGAGE IN MODERATE TO STRENUOUS EXERCISE (LIKE A BRISK WALK)?: 3 DAYS

## 2020-01-30 ASSESSMENT — ACTIVITIES OF DAILY LIVING (ADL): DEPENDENT_IADLS:: MEDICATION MANAGEMENT;TRANSPORTATION;SHOPPING

## 2020-01-30 NOTE — TELEPHONE ENCOUNTER
Prior Authorization Retail Medication Request    Medication/Dose: oxyCODONE (ROXICODONE) 5 MG/5ML solution         Subscriber: [id number] SARA HASSAN     Rel to sub: 01 - Self     Member ID: [id number]     Payor: [facility] Ph: [phone]     Benefit plan: 2320-[facility] MEDICARE ADVANTAGE Ph: [phone]     Group number: [id number]     Member effective dates: from 01/01/19      [place], [place] - [address] [place] [address]  Phone: [phone] Fax: [phone]

## 2020-01-30 NOTE — PROGRESS NOTES
Clinic Care Coordination Contact    Follow Up Progress Note      Assessment: RN CC with patient and spouse Rose (consent to communicate on file).  Patient is doing well.  No further signs of infection.  Patient continues to have a power picc in place.  Patient and spouse have a future appointment with PCP scheduled for 2/7/20.  Spouse states she called to schedule the cardiology appointment, however, they advised her to call back in February to schedule the appointment in March.  They have not followed through in scheduling with surgery, but state they will continue to work on this.  No other concerns at this time.    Goals addressed this encounter:   Goals Addressed                 This Visit's Progress      #1 Medical (pt-stated)   On track     Goal Statement: I will see cardiology.  Measure of Success: Appointment will be scheduled and attended.  Supportive Steps to Achieve: Review of hospital discharge instructions.  Barriers: multiple specialists and appointments  Strengths: care coordination, supportive spouse  Date to Achieve By: 3/31/20  Patient expressed understanding of goal: yes           #2 Medical (pt-stated)   Not on track     Goal Statement: I will see surgery as instructed.  Measure of Success: Appointment will be scheduled and attended.  Supportive Steps to Achieve: Review of hospital discharge instructions.  Barriers: multiple specialists and discharge instructions.  Strengths: care coordination, supportive spouse  Date to Achieve By: 3/1/20  Patient expressed understanding of goal: yes                Intervention/Education provided during outreach: RN CC reviewed plan of care and importance of timely follow through.     Outreach Frequency: monthly    Plan:   1. Patient will see PCP as scheduled.  2. Patient/spouse will schedule appointment with surgery and cardiology.  3. RN CC will follow up in 1 month.    Melissa Behl BSN, RN, PHN, CCM  Primary Care Clinical RN Care Coordinator  [facility]  [facility]   [phone]

## 2020-01-30 NOTE — PROGRESS NOTES
This is a recent snapshot of the patient's [facility] medical record.  For current drug dose and complete information and questions, call [phone] or In Basket pool, fv home infusion ([id number])  CSN Number:  [id number]

## 2020-01-30 NOTE — LETTER
[facility]  Complex Care Plan  About Me:    Patient Name:  Parker Acevedo    YOB: 1970  Age:         49 year old   [place] MRN:    [id number] Telephone Information:  Home Phone [phone]   Mobile [phone]       Address:  [address] [place] [address] Email address:  [email or web address]      Emergency Contact(s)    Name Relationship Lgl Grd Work Phone Home Phone Mobile Phone   1. BERTRAND RAMOS* Spouse No  [phone]   2. JEYSON CAIN * Relative No  [phone]   3. CHARLI ACEVEDO* Mother No  [phone]           Primary language:  English     needed? No   [place] [facility]:  [phone] op. 1  Other communication barriers: Glasses  Preferred Method of Communication:  Chris  Current living arrangement: I live in a private home with spouse  Mobility Status/ Medical Equipment: Independent    Health Maintenance  Health Maintenance Reviewed: Due/Overdue   Health Maintenance Due   Topic Date Due     MEDICARE ANNUAL WELLNESS VISIT  06/21/2017     TOBACCO CESSATION COUNSELING  02/06/2019     PHQ-2  01/01/2020        My Access Plan  Medical Emergency 911   Primary Clinic Line [facility] - [phone]   24 Hour Appointment Line [phone] or  [phone]) (toll-free)   24 Hour Nurse Line [phone] (toll-free)   Preferred Urgent Care Other   Preferred Hospital [facility]  [phone]   Preferred Pharmacy [facility] [place], [place] - [address]     Behavioral Health Crisis Line The National Suicide Prevention Lifeline at 1-794.527.7269 or 911             My Care Team Members  Patient Care Team       Relationship Specialty Notifications Start End    Chuy Isaac MD PCP - General Internal Medicine  10/30/18     Phone: [phone] Fax: [phone]         [address] [facility]  [address]    Francis Vyas MD Referring Physician Surgery  4/9/15     GI     Phone: [phone] Fax: [phone]         [address] [facility] [address] [facility] [address]    Bill Brumfield MD MD Gastroenterology  6/2/15     Phone: [phone] Fax: [phone]         [address] [facility] [address]    Patti Milligan MD MD Pain Clinic  6/2/15     Phone: [phone] Fax: [phone]         [address] [place] [address] [facility] [address]    Behl, Melissa K, RN Lead Care Coordinator Primary Care - CC Admissions 3/28/18     Phone: [phone] Fax: [phone]        Chuy Isaac MD Assigned PCP   11/11/18     Phone: [phone] Fax: [phone]         [address] [facility] [address]    Marlyn Jenkins MD MD Ophthalmology  1/29/19     Phone: [phone] Fax: [phone]         [address] [facility] [address] [facility] [address]    Marlyn Jenkins MD MD Ophthalmology  2/11/19     Phone: [phone] Fax: [phone]         [address] [facility] [address] [facility] [address]            My Care Plans  Self Management and Treatment Plan  Goals and (Comments)  Goals        General    #1 Medical (pt-stated)     Notes - Note edited  1/30/2020  2:28 PM by Behl, Melissa K, RN    Goal Statement: I will see cardiology.  Measure of Success: Appointment will be scheduled and attended.  Supportive Steps to Achieve: Review of hospital discharge instructions.  Barriers: multiple specialists and appointments  Strengths: care coordination, supportive spouse  Date to Achieve By: 3/31/20  Patient expressed understanding of goal: yes         #2 Medical (pt-stated)     Notes - Note edited  1/30/2020  2:28 PM by Behl, Melissa K, RN    Goal Statement: I will see surgery as instructed.  Measure of Success: Appointment will be scheduled and attended.  Supportive Steps to Achieve: Review of hospital discharge instructions.  Barriers: multiple specialists and discharge  instructions.  Strengths: care coordination, supportive spouse  Date to Achieve By: 3/1/20  Patient expressed understanding of goal: yes                Action Plans on File:                       Advance Care Plans/Directives Type:   Type Advanced Care Plans/Directives: Advanced Directive - On File    My Medical and Care Information  Problem List   Patient Active Problem List   Diagnosis     Peptic ulcer disease     Gastric bypass status for obesity     Chronic abdominal pain     Vomiting     Anemia     ADHD (attention deficit hyperactivity disorder), inattentive type     Vitamin B12 deficiency without anemia     Thiamine deficiency     Dehydration     Dysphagia     Weight loss, non-intentional     Malnutrition (H)     Chronic anxiety     Constipation     Bile reflux esophagitis     Former smoker     Vitamin D deficiency     Iron deficiency     Health Care Home     Chronic pain     Insomnia     Positive blood culture     Fungemia     Chronic nausea     Gastrostomy tube in place (H)     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)     Short gut syndrome     Anxiety     Status post cervical spinal arthrodesis     Port or reservoir infection, initial encounter     Abnormal echocardiogram     Low serum iron     Anemia, iron deficiency     Catheter-related bloodstream infection (CRBSI)     Generalized weakness     S/P bariatric surgery     Hyperlipidemia LDL goal <130     Bacteremia     Infection by Candida species     PICC line infiltration, sequela     Gram-positive bacteremia     On total parenteral nutrition      Current Medications and Allergies:  See printed Medication Report.    Care Coordination Start Date: 1/10/2019   Frequency of Care Coordination: monthly   Form Last Updated: 01/30/2020

## 2020-01-31 ENCOUNTER — MYC MEDICAL ADVICE (OUTPATIENT)
Dept: PALLIATIVE MEDICINE | Facility: CLINIC | Age: 50
End: 2020-01-31

## 2020-01-31 NOTE — TELEPHONE ENCOUNTER
Ok to fill today.  Called pharmacy. They will notify patient. There may be a problem with insurance allowing fill.    MD MYRIAM [facility]         I did let the pharmacy know I was ok with picking up early.  I'm not sure whether insurance will be an issue.  They are going to call you with that information.    MD MYRIAM [facility]

## 2020-01-31 NOTE — TELEPHONE ENCOUNTER
Routing request for early fill on Fentanyl to Dr Milligan to review. Please see MyChart below for details. Patient would like to fill today to start on 2/5/20 due to transportation issues. Reports being on track. Prescription is already at pharmacy.    Jyothi Winchester, YADIRAN, RN  Care Coordinator  [facility] [place]

## 2020-02-01 ENCOUNTER — MYC MEDICAL ADVICE (OUTPATIENT)
Dept: PALLIATIVE MEDICINE | Facility: CLINIC | Age: 50
End: 2020-02-01

## 2020-02-03 NOTE — TELEPHONE ENCOUNTER
PA Initiation    Medication: oxyCODONE (ROXICODONE) 5 MG/5ML solution - INITIATED  Insurance Company: [employer] [place] - Phone [phone] Fax [phone]  Pharmacy Filling the Rx: [place], [place] - [address] [place] [address]  Filling Pharmacy Phone:    Filling Pharmacy Fax:    Start Date: 2/3/2020

## 2020-02-04 ENCOUNTER — PATIENT OUTREACH (OUTPATIENT)
Dept: CARE COORDINATION | Facility: CLINIC | Age: 50
End: 2020-02-04

## 2020-02-04 ENCOUNTER — HOME INFUSION (PRE-WILLOW HOME INFUSION) (OUTPATIENT)
Dept: PHARMACY | Facility: CLINIC | Age: 50
End: 2020-02-04

## 2020-02-04 ENCOUNTER — NURSE TRIAGE (OUTPATIENT)
Dept: NURSING | Facility: CLINIC | Age: 50
End: 2020-02-04

## 2020-02-04 NOTE — TELEPHONE ENCOUNTER
Pt reporting, He pulled his IV out when he got home from the clinic today.    Pt would like orders placed for his IV to be put back under general anesthetic.      Pt refusing to go to the ER in the [place], [place] area.      Please call the Pt back as soon as possible @ [phone] for further assistance.          Thank you     Rose Waters RN  Central Triage Red Flags/Med Refills      Reason for Disposition    [1] Dressing needs to be changed (e.g., wet, soiled, loose, or open to air) AND [2] unable or unwilling to perform dressing change    Additional Information    Negative: Severe difficulty breathing (e.g., struggling for each breath, speaks in single words)    Negative: Shock suspected (e.g., cold/pale/clammy skin, too weak to stand, low BP, rapid pulse)    Negative: Sounds like a life-threatening emergency to the triager    Negative: IV not running or running slowly    Negative: [1] Difficulty breathing AND [2] not severe    Negative: Arm is swollen, new onset (or leg swelling if IV in lower extremity)    Negative: Fever > 100.5 F (38.1 C)    Negative: Patient sounds very sick or weak to the triager    Negative: Pus or cloudy fluid from IV site    Negative: [1] Red streak at IV site AND [2] palpable cord    Negative: [1] Red streak at IV site AND [2] longer than 1 inch (2.5 cm)    Negative: [1] Skin redness AND [2] extends > 1 inch (2.5 cm) from IV site    Negative: Skin swelling at IV site    Negative: [1] Raised bruise at IV site AND [2] size > 2 inches (5 cm) AND [3] expanding    Negative: [1] Pain at IV site or shooting up arm AND [2] IV running slowly    Negative: [1] Mild bleeding at IV site AND [2] not stopped after following CARE ADVICE    Protocols used: IV SITE (SKIN) SYMPTOMS-A-AH

## 2020-02-04 NOTE — TELEPHONE ENCOUNTER
Prior Authorization Approval    Authorization Effective Date: 1/1/2020  Authorization Expiration Date: 2/4/2021  Medication: oxyCODONE (ROXICODONE) 5 MG/5ML solution - APPROVED  Approved Dose/Quantity: 450ML PER 30 DAYS  Reference #: [id number]   Insurance Company: [employer] [place] - Phone [phone] Fax [phone]  Expected CoPay:       CoPay Card Available:      Foundation Assistance Needed:    Which Pharmacy is filling the prescription (Not needed for infusion/clinic administered): [facility] [place] - [address] [facility]  Pharmacy Notified: Yes  Patient Notified: Yes

## 2020-02-04 NOTE — TELEPHONE ENCOUNTER
Routed to  for FYI.     Prior Authorization Approval  Authorization Effective Date: 1/1/2020  Authorization Expiration Date: 2/4/2021  Medication: oxyCODONE (ROXICODONE) 5 MG/5ML solution - APPROVED  Approved Dose/Quantity: 450ML PER 30 DAYS    Closing encounter....    Lyndsay Solo MA

## 2020-02-04 NOTE — PROGRESS NOTES
Clinic Care Coordination Contact  Care Team Conversations    RN CC received a call from patient's spouse Rose (consent to communicate on file) stating patient's PICC had fallen out and she inquired if orders had been place for patient to have it reinserted.    RN CC noted [employer] message sent to Dr. Vyas today.  Rose states patient requests to have the PICC placed under general anesthesia due to pain from last PICC placement and requests for it to be placed at the hospital/Same Day Surgery.  RN CC contacted Dr. Vyas's office and relayed the message above.  Plan: Primary Care RN CC will follow up with patient/spouse as previously planned.    Melissa Behl BSN, RN, PHN, CCM  Primary Care Clinical RN Care Coordinator  [facility]   [phone]

## 2020-02-05 NOTE — PROGRESS NOTES
This is a recent snapshot of the patient's [facility] medical record.  For current drug dose and complete information and questions, call [phone] or In Basket pool, fv home infusion ([id number])  CSN Number:  [id number]

## 2020-02-06 ENCOUNTER — MYC MEDICAL ADVICE (OUTPATIENT)
Dept: PALLIATIVE MEDICINE | Facility: CLINIC | Age: 50
End: 2020-02-06

## 2020-02-06 ENCOUNTER — MYC REFILL (OUTPATIENT)
Dept: INTERNAL MEDICINE | Facility: CLINIC | Age: 50
End: 2020-02-06

## 2020-02-06 ENCOUNTER — MYC REFILL (OUTPATIENT)
Dept: PALLIATIVE MEDICINE | Facility: CLINIC | Age: 50
End: 2020-02-06

## 2020-02-06 ENCOUNTER — HOME INFUSION (PRE-WILLOW HOME INFUSION) (OUTPATIENT)
Dept: PHARMACY | Facility: CLINIC | Age: 50
End: 2020-02-06

## 2020-02-06 DIAGNOSIS — R10.9 CHRONIC ABDOMINAL PAIN: ICD-10-CM

## 2020-02-06 DIAGNOSIS — Z79.891 ENCOUNTER FOR LONG-TERM OPIATE ANALGESIC USE: ICD-10-CM

## 2020-02-06 DIAGNOSIS — Z98.84 GASTRIC BYPASS STATUS FOR OBESITY: ICD-10-CM

## 2020-02-06 DIAGNOSIS — E44.0 MODERATE PROTEIN-CALORIE MALNUTRITION (H): Primary | ICD-10-CM

## 2020-02-06 DIAGNOSIS — G89.29 CHRONIC ABDOMINAL PAIN: ICD-10-CM

## 2020-02-06 RX ORDER — OXYCODONE HCL 5 MG/5 ML
5-10 SOLUTION, ORAL ORAL 2 TIMES DAILY PRN
Qty: 450 ML | Refills: 0 | Status: CANCELLED | OUTPATIENT
Start: 2020-02-06

## 2020-02-06 NOTE — TELEPHONE ENCOUNTER
GI cocktail       Last Written Prescription Date:  1 bottle   Last Fill Quantity: 1,   # refills: 1  Last Office Visit: 11/25/19  Future Office visit:    Next 5 appointments (look out 90 days)    Feb 07, 2020 11:30 AM CST  [facility] with Chuy [facility] MD  [facility] [address] [facility] [address]  [phone]   Feb 12, 2020  1:15 PM CST  Return Visit with Patti Milligan MD  [facility] ([facility]) [address] [address]  [phone]           Routing refill request to provider for review/approval because:  Drug not on the FM[facility], UMP or  Health refill protocol or controlled substance

## 2020-02-06 NOTE — TELEPHONE ENCOUNTER
Patient requesting refill(s) of oxyCODONE (ROXICODONE) 5 MG/5ML solution    Last dispensed from pharmacy on 01/13/2020    Pt last seen by prescribing provider on 11/13/2019  Next appt scheduled for 11/13/2019     checked in the past 6 months? Yes If no, print current report and give to RN    Last urine drug screen date 09/11/2019  Current opioid agreement on file (completed within the last year) Yes Date of opioid agreement: 09/11/2019    Processing (pick one and delete the others):      E-prescribe to [facility] [place], [place]   pharmacy    Will route to nursing pool for review and preparation of prescription(s).

## 2020-02-06 NOTE — TELEPHONE ENCOUNTER
From: Parker Acevedo      Created: 2/6/2020 1:51 PM          I was wondering if you would consider going up another 5ml this month do to I will be having a new picc line put in and plus I will have my g tube replaced at the same time if you have any questions feel free to call me at 9  and thank you so much

## 2020-02-07 ENCOUNTER — TELEPHONE (OUTPATIENT)
Dept: SURGERY | Facility: CLINIC | Age: 50
End: 2020-02-07

## 2020-02-07 NOTE — PROGRESS NOTES
This is a recent snapshot of the patient's [facility] medical record.  For current drug dose and complete information and questions, call [phone] or In Basket pool, fv home infusion ([id number])  CSN Number:  [id number]

## 2020-02-07 NOTE — TELEPHONE ENCOUNTER
Medication refill information reviewed. Message from patient is pasted below.   ----------------------------  From: Parker Shawn Hilarioflorentino      Created: 2/6/2020 1:51 PM         I was wondering if you would consider going up another 5ml this month do to I will be having a new picc line put in and plus I will have my g tube replaced at the same time if you have any questions feel free to call me at 2  and thank you so much            From: Jyothi Winchester, RN      Created: 2/7/2020 2:40 PM        Esther Milligan is not in the clinic today. She has said in the recent past that she is not increasing this medication so for now please do not take more then the currently prescribed dose. I will forward your request to her and she will see it next week. We are also processing your medication refill for you and someone will let you know when that has been signed.     Thank you  ERIN Lazar, RN  Care Coordinator  [facility]          Due date for oxyCODONE (ROXICODONE) 5 MG/5ML solution is 2/12/20     Prescriptions prepped for review.     Will route to provider.

## 2020-02-07 NOTE — TELEPHONE ENCOUNTER
From: Jyothi Winchester, RN      Created: 2/7/2020 2:40 PM        Esther Milligan is not in the clinic today. She has said in the recent past that she is not increasing this medication so for now please do not take more then the currently prescribed dose. I will forward your request to her and she will see it next week. We are also processing your medication refill for you and someone will let you know when that has been signed.     Thank you  ERIN Lazar, RN  Care Coordinator  [facility] [place]

## 2020-02-10 RX ORDER — OXYCODONE HCL 5 MG/5 ML
5-10 SOLUTION, ORAL ORAL 2 TIMES DAILY PRN
Qty: 600 ML | Refills: 0 | Status: SHIPPED | OUTPATIENT
Start: 2020-02-10 | End: 2020-03-04

## 2020-02-10 NOTE — TELEPHONE ENCOUNTER
Spoke with Sindhu at . She verified that orders were placed in Epic. There are no openings this week. Patient needs to call to arrange PICC line placement with local anesthetic.

## 2020-02-10 NOTE — TELEPHONE ENCOUNTER
Called and left message with IR in regards to patient's PICC line orders. Direct number left for call back.

## 2020-02-10 NOTE — TELEPHONE ENCOUNTER
Finalized in another encounter.    Jessica Milligan MD  [facility]

## 2020-02-11 DIAGNOSIS — E44.0 MODERATE PROTEIN-CALORIE MALNUTRITION (H): Primary | ICD-10-CM

## 2020-02-13 ENCOUNTER — HOME INFUSION (PRE-WILLOW HOME INFUSION) (OUTPATIENT)
Dept: PHARMACY | Facility: CLINIC | Age: 50
End: 2020-02-13

## 2020-02-13 DIAGNOSIS — E61.1 IRON DEFICIENCY: ICD-10-CM

## 2020-02-13 DIAGNOSIS — Z98.84 S/P BARIATRIC SURGERY: ICD-10-CM

## 2020-02-13 DIAGNOSIS — E46 MALNUTRITION (H): Primary | ICD-10-CM

## 2020-02-14 NOTE — PROGRESS NOTES
This is a recent snapshot of the patient's [facility] medical record.  For current drug dose and complete information and questions, call [phone] or In Basket pool, fv home infusion ([id number])  CSN Number:  [id number]

## 2020-02-18 ENCOUNTER — ANESTHESIA EVENT (OUTPATIENT)
Dept: SURGERY | Facility: AMBULATORY SURGERY CENTER | Age: 50
End: 2020-02-18

## 2020-02-20 ASSESSMENT — ENCOUNTER SYMPTOMS
DYSRHYTHMIAS: 0
SEIZURES: 0

## 2020-02-20 ASSESSMENT — LIFESTYLE VARIABLES: TOBACCO_USE: 1

## 2020-02-21 ENCOUNTER — HOSPITAL ENCOUNTER (OUTPATIENT)
Facility: AMBULATORY SURGERY CENTER | Age: 50
End: 2020-02-21
Attending: PHYSICIAN ASSISTANT
Payer: COMMERCIAL

## 2020-02-21 ENCOUNTER — ANCILLARY PROCEDURE (OUTPATIENT)
Dept: RADIOLOGY | Facility: AMBULATORY SURGERY CENTER | Age: 50
End: 2020-02-21
Attending: SURGERY
Payer: COMMERCIAL

## 2020-02-21 ENCOUNTER — HOME INFUSION (PRE-WILLOW HOME INFUSION) (OUTPATIENT)
Dept: PHARMACY | Facility: CLINIC | Age: 50
End: 2020-02-21

## 2020-02-21 ENCOUNTER — ANESTHESIA (OUTPATIENT)
Dept: SURGERY | Facility: AMBULATORY SURGERY CENTER | Age: 50
End: 2020-02-21

## 2020-02-21 VITALS
TEMPERATURE: 97.8 F | OXYGEN SATURATION: 100 % | WEIGHT: 174 LBS | SYSTOLIC BLOOD PRESSURE: 129 MMHG | HEIGHT: 71 IN | BODY MASS INDEX: 24.36 KG/M2 | RESPIRATION RATE: 16 BRPM | DIASTOLIC BLOOD PRESSURE: 73 MMHG | HEART RATE: 63 BPM

## 2020-02-21 DIAGNOSIS — E44.0 MODERATE PROTEIN-CALORIE MALNUTRITION (H): ICD-10-CM

## 2020-02-21 DEVICE — IMPLANTABLE DEVICE: Type: IMPLANTABLE DEVICE | Site: ARM | Status: FUNCTIONAL

## 2020-02-21 RX ORDER — ALBUTEROL SULFATE 0.83 MG/ML
2.5 SOLUTION RESPIRATORY (INHALATION) EVERY 4 HOURS PRN
Status: DISCONTINUED | OUTPATIENT
Start: 2020-02-21 | End: 2020-02-22 | Stop reason: HOSPADM

## 2020-02-21 RX ORDER — FENTANYL CITRATE 50 UG/ML
25-50 INJECTION, SOLUTION INTRAMUSCULAR; INTRAVENOUS
Status: DISCONTINUED | OUTPATIENT
Start: 2020-02-21 | End: 2020-02-22 | Stop reason: HOSPADM

## 2020-02-21 RX ORDER — NALOXONE HYDROCHLORIDE 0.4 MG/ML
.1-.4 INJECTION, SOLUTION INTRAMUSCULAR; INTRAVENOUS; SUBCUTANEOUS
Status: DISCONTINUED | OUTPATIENT
Start: 2020-02-21 | End: 2020-02-22 | Stop reason: HOSPADM

## 2020-02-21 RX ORDER — PROPOFOL 10 MG/ML
INJECTION, EMULSION INTRAVENOUS PRN
Status: DISCONTINUED | OUTPATIENT
Start: 2020-02-21 | End: 2020-02-21

## 2020-02-21 RX ORDER — SODIUM CHLORIDE, SODIUM LACTATE, POTASSIUM CHLORIDE, CALCIUM CHLORIDE 600; 310; 30; 20 MG/100ML; MG/100ML; MG/100ML; MG/100ML
500 INJECTION, SOLUTION INTRAVENOUS CONTINUOUS
Status: DISCONTINUED | OUTPATIENT
Start: 2020-02-21 | End: 2020-02-22 | Stop reason: HOSPADM

## 2020-02-21 RX ORDER — ONDANSETRON 4 MG/1
4 TABLET, ORALLY DISINTEGRATING ORAL EVERY 30 MIN PRN
Status: DISCONTINUED | OUTPATIENT
Start: 2020-02-21 | End: 2020-02-22 | Stop reason: HOSPADM

## 2020-02-21 RX ORDER — HEPARIN SODIUM,PORCINE 10 UNIT/ML
5-10 VIAL (ML) INTRAVENOUS
Status: DISCONTINUED | OUTPATIENT
Start: 2020-02-21 | End: 2020-02-22 | Stop reason: HOSPADM

## 2020-02-21 RX ORDER — LIDOCAINE HYDROCHLORIDE 20 MG/ML
INJECTION, SOLUTION INFILTRATION; PERINEURAL PRN
Status: DISCONTINUED | OUTPATIENT
Start: 2020-02-21 | End: 2020-02-21

## 2020-02-21 RX ORDER — ONDANSETRON 2 MG/ML
4 INJECTION INTRAMUSCULAR; INTRAVENOUS EVERY 30 MIN PRN
Status: DISCONTINUED | OUTPATIENT
Start: 2020-02-21 | End: 2020-02-22 | Stop reason: HOSPADM

## 2020-02-21 RX ORDER — HEPARIN SODIUM,PORCINE 10 UNIT/ML
VIAL (ML) INTRAVENOUS PRN
Status: DISCONTINUED | OUTPATIENT
Start: 2020-02-21 | End: 2020-02-21 | Stop reason: HOSPADM

## 2020-02-21 RX ORDER — ACETAMINOPHEN 325 MG/1
975 TABLET ORAL ONCE
Status: COMPLETED | OUTPATIENT
Start: 2020-02-21 | End: 2020-02-21

## 2020-02-21 RX ORDER — PROPOFOL 10 MG/ML
INJECTION, EMULSION INTRAVENOUS CONTINUOUS PRN
Status: DISCONTINUED | OUTPATIENT
Start: 2020-02-21 | End: 2020-02-21

## 2020-02-21 RX ORDER — KETOROLAC TROMETHAMINE 30 MG/ML
30 INJECTION, SOLUTION INTRAMUSCULAR; INTRAVENOUS EVERY 6 HOURS PRN
Status: DISCONTINUED | OUTPATIENT
Start: 2020-02-21 | End: 2020-02-22 | Stop reason: HOSPADM

## 2020-02-21 RX ORDER — GABAPENTIN 300 MG/1
300 CAPSULE ORAL ONCE
Status: COMPLETED | OUTPATIENT
Start: 2020-02-21 | End: 2020-02-21

## 2020-02-21 RX ORDER — OXYCODONE HYDROCHLORIDE 5 MG/1
5-10 TABLET ORAL EVERY 4 HOURS PRN
Status: DISCONTINUED | OUTPATIENT
Start: 2020-02-21 | End: 2020-02-22 | Stop reason: HOSPADM

## 2020-02-21 RX ORDER — HYDROMORPHONE HYDROCHLORIDE 1 MG/ML
.3-.5 INJECTION, SOLUTION INTRAMUSCULAR; INTRAVENOUS; SUBCUTANEOUS EVERY 10 MIN PRN
Status: DISCONTINUED | OUTPATIENT
Start: 2020-02-21 | End: 2020-02-22 | Stop reason: HOSPADM

## 2020-02-21 RX ORDER — DEXAMETHASONE SODIUM PHOSPHATE 4 MG/ML
4 INJECTION, SOLUTION INTRA-ARTICULAR; INTRALESIONAL; INTRAMUSCULAR; INTRAVENOUS; SOFT TISSUE EVERY 10 MIN PRN
Status: DISCONTINUED | OUTPATIENT
Start: 2020-02-21 | End: 2020-02-22 | Stop reason: HOSPADM

## 2020-02-21 RX ORDER — SODIUM CHLORIDE, SODIUM LACTATE, POTASSIUM CHLORIDE, CALCIUM CHLORIDE 600; 310; 30; 20 MG/100ML; MG/100ML; MG/100ML; MG/100ML
INJECTION, SOLUTION INTRAVENOUS CONTINUOUS
Status: DISCONTINUED | OUTPATIENT
Start: 2020-02-21 | End: 2020-02-22 | Stop reason: HOSPADM

## 2020-02-21 RX ORDER — MEPERIDINE HYDROCHLORIDE 25 MG/ML
12.5 INJECTION INTRAMUSCULAR; INTRAVENOUS; SUBCUTANEOUS
Status: DISCONTINUED | OUTPATIENT
Start: 2020-02-21 | End: 2020-02-22 | Stop reason: HOSPADM

## 2020-02-21 RX ORDER — HEPARIN SODIUM,PORCINE 10 UNIT/ML
5 VIAL (ML) INTRAVENOUS EVERY 24 HOURS
Status: DISCONTINUED | OUTPATIENT
Start: 2020-02-21 | End: 2020-02-22 | Stop reason: HOSPADM

## 2020-02-21 RX ADMIN — LIDOCAINE HYDROCHLORIDE 40 MG: 20 INJECTION, SOLUTION INFILTRATION; PERINEURAL at 07:05

## 2020-02-21 RX ADMIN — PROPOFOL 150 MCG/KG/MIN: 10 INJECTION, EMULSION INTRAVENOUS at 07:06

## 2020-02-21 RX ADMIN — PROPOFOL 20 MG: 10 INJECTION, EMULSION INTRAVENOUS at 07:06

## 2020-02-21 RX ADMIN — GABAPENTIN 300 MG: 300 CAPSULE ORAL at 06:53

## 2020-02-21 RX ADMIN — ACETAMINOPHEN 975 MG: 325 TABLET ORAL at 06:52

## 2020-02-21 RX ADMIN — SODIUM CHLORIDE, SODIUM LACTATE, POTASSIUM CHLORIDE, CALCIUM CHLORIDE 500 ML: 600; 310; 30; 20 INJECTION, SOLUTION INTRAVENOUS at 06:57

## 2020-02-21 ASSESSMENT — MIFFLIN-ST. JEOR: SCORE: 1686.39

## 2020-02-21 NOTE — ANESTHESIA CARE TRANSFER NOTE
Patient: Parker Acevedo    Procedure(s):  INSERTION, PICC    Diagnosis: Moderate protein-calorie malnutrition (H) [E44.0]  Diagnosis Additional Information: No value filed.    Anesthesia Type:   MAC     Note:  Airway :Room Air  Patient transferred to:Phase II  Comments: Lokesh Report: Identifed the Patient, Identified the Reponsible Provider, Reviewed the pertinent medical history, Discussed the surgical course, Reviewed Intra-OP anesthesia mangement and issues during anesthesia, Set expectations for post-procedure period and Allowed opportunity for questions and acknowledgement of understanding      Vitals: (Last set prior to Anesthesia Care Transfer)    CRNA VITALS  2/21/2020 0723 - 2/21/2020 0754      2/21/2020             Resp Rate (set):  10                Electronically Signed By: SAILAJA Tian CRNA  February 21, 2020  7:54 AM

## 2020-02-21 NOTE — ANESTHESIA POSTPROCEDURE EVALUATION
Anesthesia POST Procedure Evaluation    Patient: Parker Acevedo   MRN:     [id number] Gender:   male   Age:    49 year old :      1970        Preoperative Diagnosis: Moderate protein-calorie malnutrition (H) [E44.0]   Procedure(s):  INSERTION, PICC   Postop Comments: No value filed.     Anesthesia Type: MAC       Disposition: Outpatient   Postop Pain Control: Uneventful            Sign Out: Well controlled pain   PONV: No   Neuro/Psych: Uneventful            Sign Out: Acceptable/Baseline neuro status   Airway/Respiratory: Uneventful            Sign Out: Acceptable/Baseline resp. status   CV/Hemodynamics: Uneventful            Sign Out: Acceptable CV status   Other NRE: NONE   DID A NON-ROUTINE EVENT OCCUR? No         Last Anesthesia Record Vitals:  CRNA VITALS  2020 0723 - 2020 0823      2020             Resp Rate (set):  10          Last PACU Vitals:  Vitals Value Taken Time   /86 2020  7:59 AM   Temp 36.9  C (98.4  F) 2020  7:59 AM   Pulse     Resp 16 2020  7:59 AM   SpO2 100 % 2020  7:59 AM   Temp src     NIBP 128/78 2020  7:51 AM   Pulse 54 2020  7:53 AM   SpO2 100 % 2020  7:53 AM   Resp     Temp     Ht Rate 62 2020  7:53 AM   Temp 2           Electronically Signed By: Benton Elkins MD, 2020, 8:41 AM

## 2020-02-21 NOTE — DISCHARGE INSTRUCTIONS
A collaboration between [facility] and [facility]  Experts in minimally invasive, targeted treatments performed using imaging guidance    Venous Access Device,  Port Catheter or Tunneled or Non-Tunneled Central Line Placement    Today you had a procedure today to install a venous access device; either a tunneled central vein catheter or a subcutaneous port catheter.    One of our Radiology PAs performed this procedure for you today:  ? Koko Enriquez PA-C  ? MOE August PA-C  ? Jhonatan Alejandro PA-C  ? Linda Queen PA-C   ? Per Dumont PA-C    After you go home:  - Drink plenty of fluids.  Generally 6-8 (8 ounce) glasses a day is recommended.  - Resume your regular diet unless otherwise ordered by a medical provider.  - Keep any applied tape/gauze dressings clean and dry.  Change tape/gauze dressings if they get wet or soiled.  - You may shower the following day after procedure, however cover and protect from moisture any tape/gauze dressings.  You may let water hit and run over dried skin glue, but do not scrub.  Pat the area dry after showering.  - Port placement incisions are closed with absorbable suture, meaning they do not need to be removed at a later date, and a topical skin adhesive (skin glue).  This glue will wear off in 7-14 days.  Do not remove before this time.  If 14 days have passed and residual glue is present, you may gently remove it.  - Do not apply gels, lotions, or ointments to the glue site for the first 10 days as this may cause the glue to prematurely soften and fail.  - Do not perform strenuous activities or lift greater than 10 pounds for the next three days.  - If there is bleeding or oozing from the procedure site, apply firm pressure to the area for 5-10 minutes.  If the bleeding continues seek medical advice at the numbers below.  - Mild procedure site discomfort can be treated with an ice pack and over-the-counter pain  relievers.        For 24 hours after any sedation used:  - Relax and take it easy.  No strenuous activities.  - Do not drive or operate machines at home or at work.  - No alcohol consumption.  - Do not make any important or legal decisions.    Call our Interventional Radiology (IR) service if:  - If you start bleeding from the procedure site.  If you do start to bleed from the site, lie down and hold some pressure on the site.  Our radiology provider can help you decide if you need to return to the hospital.  - If you have new or worsening pain related to the procedure.  - If you have concerning swelling at the procedure site.  - If you develop persistent nausea or vomiting.  - If you develop hives or a rash or any unexplained itching.  - If you have a fever of greater than 100.5  F and chills in the first 5 days after procedure.  - Any other concerns related to your procedure.      [facility]  Interventional Radiology (IR)  [address] [place] [address]    Contact Number:  [phone]  (IR control desk)  - Monday - Friday 8:00 am - 4:30 pm    After hours for urgent concerns:  [phone]  - After 4:30 pm Monday - Friday, Weekends and Holidays.   - Ask for Interventional Radiology on-call.  Someone is available 24 hours a day.  - [facility] toll free number:  [phone]

## 2020-02-21 NOTE — ANESTHESIA PREPROCEDURE EVALUATION
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     Complication of anesthesia      Difficulty swallowing      Gastric ulcer, unspecified as acute or chronic, without mention of hemorrhage, perforation, or obstruction      Gastro-oesophageal reflux disease      Head injury      Hiatal hernia      Other bladder disorder      Other chronic pain      PONV (postoperative nausea and vomiting)      Severe malnutrition (H)     TPN     Short gut syndrome      Tobacco abuse       Past Surgical History:   Procedure Laterality Date     AMPUTATION       APPENDECTOMY       BACK SURGERY  11/3/2014    curve in the spine     BIOPSY LYMPH NODE CERVICAL N/A 2015    Procedure: BIOPSY LYMPH NODE CERVICAL;  Surgeon: Baron Scanlon MD;  Location: [facility]     C GASTRIC BYPASS,OBESE<100CM SHAYLEE-EN-Y  2002    lost 300 pounds     CHOLECYSTECTOMY       DISCECTOMY, FUSION CERVICAL ANTERIOR ONE LEVEL, COMBINED N/A 2/15/2017    Procedure: COMBINED DISCECTOMY, FUSION CERVICAL ANTERIOR ONE LEVEL;  Surgeon: Darren Campos MD;  [facility]: [facility]     ENDOSCOPIC INSERTION TUBE GASTROSTOMY  2013    Procedure: ENDOSCOPIC INSERTION TUBE GASTROSTOMY;;  Surgeon: Francis Vyas MD;  [facility]     ENDOSCOPIC ULTRASOUND UPPER GASTROINTESTINAL TRACT (GI)  2011    Procedure:ENDOSCOPIC ULTRASOUND UPPER GASTROINTESTINAL TRACT (GI); Both Procedures done Conjointly; Surgeon:NEREIDA HOUSER; [facility]      ENDOSCOPIC ULTRASOUND UPPER GASTROINTESTINAL TRACT (GI)  9/9/2013    Procedure: ENDOSCOPIC ULTRASOUND UPPER GASTROINTESTINAL TRACT (GI);  Endoscopic Ultrasound Guide Gastrostomy Tube Placement  C-arm;  Surgeon: Noe Lizarraga MD;  [facility]     ENDOSCOPY  03/25/11    [facility], [facility]     ENDOSCOPY  08/04/09    Upper Endoscopy, [facility]     ENDOSCOPY  01/05/09    Upper Endoscopy, [facility]     ESOPHAGOSCOPY, GASTROSCOPY, DUODENOSCOPY (EGD), COMBINED  4/20/2011    Procedure:COMBINED ESOPHAGOSCOPY, GASTROSCOPY, DUODENOSCOPY (EGD); Surgeon:FRANCIS VYAS; [facility]     ESOPHAGOSCOPY, GASTROSCOPY, DUODENOSCOPY (EGD), COMBINED  6/15/2011    Procedure:COMBINED ESOPHAGOSCOPY, GASTROSCOPY, DUODENOSCOPY (EGD); Surgeon:FRANCIS VYAS; [facility]     ESOPHAGOSCOPY, GASTROSCOPY, DUODENOSCOPY (EGD), COMBINED  6/12/2013    Procedure: COMBINED ESOPHAGOSCOPY, GASTROSCOPY, DUODENOSCOPY (EGD);;  Surgeon: Francis Vyas MD;  [facility]     ESOPHAGOSCOPY, GASTROSCOPY, DUODENOSCOPY (EGD), COMBINED  11/22/2013    Procedure: COMBINED ESOPHAGOSCOPY, GASTROSCOPY, DUODENOSCOPY (EGD);;  Surgeon: Francis Vyas MD;  Location:  OR     ESOPHAGOSCOPY, GASTROSCOPY, DUODENOSCOPY (EGD), COMBINED  4/30/2014    Procedure: COMBINED ESOPHAGOSCOPY, GASTROSCOPY, DUODENOSCOPY (EGD);  Surgeon: Francis Vyas MD;  [facility]     ESOPHAGOSCOPY, GASTROSCOPY, DUODENOSCOPY (EGD), COMBINED N/A 2/20/2015    Procedure: COMBINED ESOPHAGOSCOPY, GASTROSCOPY, DUODENOSCOPY (EGD), BIOPSY SINGLE OR MULTIPLE;  Surgeon: Baron Scanlon MD;  Location:  OR     ESOPHAGOSCOPY, GASTROSCOPY, DUODENOSCOPY (EGD), COMBINED N/A 9/30/2015    Procedure: COMBINED ESOPHAGOSCOPY, GASTROSCOPY, DUODENOSCOPY (EGD);  Surgeon: Francis Vyas MD;  [facility]     ESOPHAGOSCOPY, GASTROSCOPY, DUODENOSCOPY (EGD), COMBINED N/A 10/3/2019    Procedure: Upper Endoscopy;  Surgeon: Clif Morrow  MD Apolinar;  [facility]     GASTRECTOMY  6/22/2012    Procedure: GASTRECTOMY;  Open Approach, Excise Ulcers,Partial Gastrectomy, Esophagojejunostomy, Hiatal Hernia Repair, Extensive Lysis of Adhesions and Esaphagogastrodudenoscopy.;  Surgeon: Francis Vyas MD;  [facility]     GASTROJEJUNOSTOMY  08/26/09    Extensice enterolysis, partial resect. jejunum, part. resect gastric pouch, gastrojejunostomy anastomosis     HC ESOPH/GAS REFLUX TEST W NASAL IMPED ELECTRODE  8/5/2013    Procedure: ESOPHAGEAL IMPEDENCE FUNCTION TEST 1 HOUR OR LESS;  Surgeon: Halie Lang MD;  [facility]     HEAD & NECK SURGERY  2/15/2017    C5-C6     HERNIA REPAIR  2006    Umbilical hernia     HERNIORRHAPHY HIATAL  6/22/2012    Procedure: HERNIORRHAPHY HIATAL;;  Surgeon: Francis Vyas MD;  [facility]     HERNIORRHAPHY INGUINAL  11/22/2013    Procedure: HERNIORRHAPHY INGUINAL;;  Surgeon: Francis Vyas MD;  [facility]     INSERT PICC LINE Right 12/19/2019    Procedure: Picc Placement;  Surgeon: Per Dumont PA-C;  [facility]     INSERT PORT VASCULAR ACCESS Right 12/19/2017    Procedure: INSERT PORT VASCULAR ACCESS;  Right Chest Port Placement ;  Surgeon: Lisandro Alejandro PA-C;  [facility]     INSERT PORT VASCULAR ACCESS Right 8/2/2018    Procedure: INSERT PORT VASCULAR ACCESS;  Place single lumen tunneled central venous access catheter;  Surgeon: Guy Jamil PA-C;  [facility]     IR CVC TUNNEL PLACEMENT > 5 YRS OF AGE  8/7/2019     IR CVC TUNNEL REMOVAL RIGHT  10/1/2019     IR FOLLOW UP VISIT OUTPATIENT  8/7/2019     IR PICC PLACEMENT > 5 YRS OF AGE  3/7/2019     IR PICC PLACEMENT > 5 YRS OF AGE  12/19/2019     LAPAROTOMY EXPLORATORY  11/22/2013    Procedure: LAPAROTOMY EXPLORATORY;  Exploratory Laparotomy, Upper Endoscopy, Left Inguinal Hernia Repair;  Surgeon: Francis Vyas MD;  [facility]     ORTHOPEDIC SURGERY       PICC INSERTION  Right 03/16/2017    5fr DL BioFlo PICC, 42cm (3cm external) in the R medial brachial vein w/ tip in the SVC RA junction.     PICC INSERTION Left 09/23/2017    5fr DL BioFlo PICC, 45cm (1cm external) in the L basilic vein w/ tip in the SVC RA junction.     PICC INSERTION Right 05/16/2019    5Fr - 43cm, Medial brachial vein, low SVC     PICC INSERTION Right 10/02/2019    5Fr - 43cm (2cm external), basilic vein, low SVC     SHAYLEE EN Y BOWEL  2003     SOFT TISSUE SURGERY       THORACIC SURGERY       TONSILLECTOMY       TRANSESOPHAGEAL ECHOCARDIOGRAM INTRAOPERATIVE N/A 1/8/2019    Procedure: TRANSESOPHAGEAL ECHOCARDIOGRAM INTRAOPERATIVE;  Surgeon: GENERIC ANESTHESIA PROVIDER;  [facility]          Anesthesia Evaluation     . Pt has had prior anesthetic. Type: General and MAC    No history of anesthetic complications   - PONV        ROS/MED HX    ENT/Pulmonary:     (+)tobacco use, Current use , . .   (-) asthma   Neurologic:  - neg neurologic ROS    (-) seizures, CVA and TIA   Cardiovascular: Comment:   Left ventricular size is normal. Global and regional left ventricular function  is normal with an EF of 60-65%. Left ventricular wall thickness is normal.  Left ventricular diastolic function is normal.         (+) ----. : . . . :. . Previous cardiac testing Echodate:results:date: results: date: results: date: results:         (-) arrhythmias, irregular heartbeat/palpitations and valvular problems/murmurs   METS/Exercise Tolerance:  3 - Able to walk 1-2 blocks without stopping   Hematologic:         Musculoskeletal:         GI/Hepatic: Comment: S/p gastric bypass c/b short gut syndrome    (+) GERD      (-) liver disease   Renal/Genitourinary:      (-) renal disease   Endo:         Psychiatric:  - neg psychiatric ROS       Infectious Disease:         Malignancy:         Other:                         PHYSICAL EXAM:   Mental Status/Neuro: A/A/O   Airway: Facies: Feasible  Mallampati: I  Mouth/Opening: Full  TM distance: >  6 cm  Neck ROM: Full   Respiratory: Auscultation: CTAB     Resp. Rate: Normal     Resp. Effort: Normal      CV: Rhythm: Regular  Heart: Normal Sounds  Pulses: Normal   Comments:      Dental: Details; Endentulous                LABS:  CBC:   Lab Results   Component Value Date    WBC 7.8 01/14/2020    WBC 8.2 12/10/2019    HGB 11.6 (L) 01/14/2020    HGB 11.0 (L) 12/10/2019    HCT 36.1 (L) 01/14/2020    HCT 34.1 (L) 12/10/2019     01/14/2020     12/10/2019     BMP:   Lab Results   Component Value Date     01/14/2020     12/26/2019    POTASSIUM 3.9 01/14/2020    POTASSIUM 3.7 12/26/2019    CHLORIDE 111 (H) 01/14/2020    CHLORIDE 110 (H) 12/26/2019    CO2 29 01/14/2020    CO2 30 12/26/2019    BUN 15 01/14/2020    BUN 14 12/26/2019    CR 0.74 01/14/2020    CR 0.76 12/26/2019    GLC 78 01/14/2020     (H) 12/26/2019     COAGS:   Lab Results   Component Value Date    PTT 26 07/22/2019    INR 1.12 10/01/2019    FIBR 324 10/25/2016     POC:   Lab Results   Component Value Date     (H) 10/04/2019     OTHER:   Lab Results   Component Value Date    LACT 1.5 11/02/2019    A1C 4.9 07/23/2013    SILAS 8.4 (L) 01/14/2020    PHOS 3.8 01/14/2020    MAG 2.2 01/14/2020    ALBUMIN 3.3 (L) 01/14/2020    PROTTOTAL 6.9 01/14/2020    ALT 20 01/14/2020    AST 14 01/14/2020    ALKPHOS 76 01/14/2020    BILITOTAL 0.2 01/14/2020    LIPASE 58 (L) 09/29/2019    AMYLASE 178 (H) 06/28/2012    TSH 1.16 05/12/2019    CRP <2.9 11/02/2019    SED 13 05/14/2019        Preop Vitals    BP Readings from Last 3 Encounters:   12/19/19 139/83   12/08/19 136/88   11/25/19 122/72    Pulse Readings from Last 3 Encounters:   12/19/19 57   12/08/19 99   11/25/19 90      Resp Readings from Last 3 Encounters:   12/19/19 16   12/08/19 18   11/04/19 15    SpO2 Readings from Last 3 Encounters:   12/19/19 98%   12/08/19 97%   11/25/19 99%      Temp Readings from Last 1 Encounters:   12/19/19 36.4  C (97.6  F)    Ht Readings from Last 1  "Encounters:   12/19/19 1.803 m (5' 11\")      Wt Readings from Last 1 Encounters:   12/19/19 86.6 kg (191 lb)    Estimated body mass index is 26.64 kg/m  as calculated from the following:    Height as of 12/19/19: 1.803 m (5' 11\").    Weight as of 12/19/19: 86.6 kg (191 lb).     LDA:  PICC Double Lumen 05/16/19 Right Brachial vein medial (Active)   Number of days: 280       PICC Double Lumen 10/02/19 Right Basilic (Active)   Number of days: 141       PICC Double Lumen 12/19/19 Right (Active)   Number of days: 63       Gastrostomy/Enterostomy Gastrostomy LUQ 1 18 fr (Active)   Number of days: 2786        Assessment:   ASA SCORE: 3    H&P: History and physical reviewed and following examination; no interval change.   Smoking Status:  Non-Smoker/Unknown   NPO Status: NPO Appropriate     Plan:   Anes. Type:  MAC   Pre-Medication: None   Induction:  N/a   Airway: Native Airway   Access/Monitoring: PIV   Maintenance: N/a     Postop Plan:   Postop Pain: None  Postop Sedation/Airway: Not planned  Disposition: Outpatient     PONV Management:   Adult Risk Factors:, H/o PONV or Motion Sickness, Non-Smoker     CONSENT: Direct conversation   Plan and risks discussed with: Patient                          Benton Elkins MD  "

## 2020-02-21 NOTE — PROCEDURES
Interventional Radiology Brief Post Procedure Note    Procedure: IR PICC Placement    Proceduralist: Per Dumont PA-C    Assistant: None    Time Out: Prior to the start of the procedure and with procedural staff participation, I verbally confirmed the patient s identity using two indicators, relevant allergies, that the procedure was appropriate and matched the consent or emergent situation, and that the correct equipment/implants were available. Immediately prior to starting the procedure I conducted the Time Out with the procedural staff and re-confirmed the patient s name, procedure, and site/side. (The Joint Commission universal protocol was followed.)  Yes        Sedation: Monitored Anesthesia Care (MAC) administered and documented by Anesthesia Care Provider    Findings: Completed image-guided placement of 5 Malagasy, 40.5 cm double lumen peripherally inserted central venous catheter via right medial brachial. Aspirates and flushes freely, heparin locked and ready for immediate use. Tip in high right atrium.    Estimated Blood Loss: Minimal    Fluoroscopy Time:  2.2 minute(s)    SPECIMENS: None    Complications: 1. None     Condition: Stable    Plan: Follow-up per primary team. 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 Achieving central wire access was very challenging and achieving central access with the catheter was nearly impossible. Left arm should be evaluated on subsequent PICC line placement. Additionally, patients sedation needs and complex placement warrant placement at the [facility].     Comments: See dictated procedure note for full details.    Per Dumont PA-C

## 2020-02-23 ENCOUNTER — HEALTH MAINTENANCE LETTER (OUTPATIENT)
Age: 50
End: 2020-02-23

## 2020-02-24 ENCOUNTER — MYC REFILL (OUTPATIENT)
Dept: INTERNAL MEDICINE | Facility: CLINIC | Age: 50
End: 2020-02-24

## 2020-02-24 ENCOUNTER — MYC REFILL (OUTPATIENT)
Dept: PALLIATIVE MEDICINE | Facility: CLINIC | Age: 50
End: 2020-02-24

## 2020-02-24 DIAGNOSIS — R11.0 NAUSEA: ICD-10-CM

## 2020-02-24 DIAGNOSIS — E43 SEVERE MALNUTRITION (H): ICD-10-CM

## 2020-02-24 DIAGNOSIS — G89.29 CHRONIC ABDOMINAL PAIN: ICD-10-CM

## 2020-02-24 DIAGNOSIS — G89.4 CHRONIC PAIN SYNDROME: ICD-10-CM

## 2020-02-24 DIAGNOSIS — R10.9 CHRONIC ABDOMINAL PAIN: ICD-10-CM

## 2020-02-24 DIAGNOSIS — F90.0 ADHD (ATTENTION DEFICIT HYPERACTIVITY DISORDER), INATTENTIVE TYPE: ICD-10-CM

## 2020-02-24 RX ORDER — LORAZEPAM 1 MG/1
TABLET ORAL
Qty: 30 TABLET | Refills: 0 | Status: CANCELLED | OUTPATIENT
Start: 2020-02-24

## 2020-02-24 RX ORDER — ONDANSETRON 8 MG/1
TABLET, ORALLY DISINTEGRATING ORAL
Qty: 90 TABLET | Refills: 1 | Status: CANCELLED | OUTPATIENT
Start: 2020-02-24

## 2020-02-24 RX ORDER — FENTANYL 25 UG/1
1 PATCH TRANSDERMAL
Qty: 15 PATCH | Refills: 0 | Status: CANCELLED | OUTPATIENT
Start: 2020-02-24

## 2020-02-24 RX ORDER — DEXTROAMPHETAMINE SACCHARATE, AMPHETAMINE ASPARTATE, DEXTROAMPHETAMINE SULFATE AND AMPHETAMINE SULFATE 5; 5; 5; 5 MG/1; MG/1; MG/1; MG/1
20 TABLET ORAL DAILY
Qty: 30 TABLET | Refills: 0 | Status: CANCELLED | OUTPATIENT
Start: 2020-02-24

## 2020-02-24 NOTE — TELEPHONE ENCOUNTER
adderall      Last Written Prescription Date:  1/29/20  Last Fill Quantity: 30,   # refills: 0  Last Office Visit: 11/25/19  Future Office visit:    Next 5 appointments (look out 90 days)    Mar 23, 2020 10:30 AM CDT  Return Visit with Patti Milligan MD  [facility] ([facility]) [address]  [place] [address]  [phone]           Routing refill request to provider for review/approval because:  Drug not on the [facility], [facility] or [facility] refill protocol or controlled substance    ativan      Last Written Prescription Date:  1/02/20  Last Fill Quantity: 30,   # refills: 0  Last Office Visit: 11/25/19  Future Office visit:    Next 5 appointments (look out 90 days)    Mar 23, 2020 10:30 AM CDT  Return Visit with Patti Milligan MD  [facility] ([facility]) [address]  [place] [address]  [phone]           Routing refill request to provider for review/approval because:  Drug not on the [facility], [facility] or [facility] refill protocol or controlled substance

## 2020-02-24 NOTE — PROGRESS NOTES
This is a recent snapshot of the patient's [facility] medical record.  For current drug dose and complete information and questions, call [phone] or In Basket pool, [facility] home infusion ([id number])  CSN Number:  [id number]

## 2020-02-25 ENCOUNTER — HOSPITAL ENCOUNTER (OUTPATIENT)
Dept: LAB | Facility: CLINIC | Age: 50
Discharge: HOME | End: 2020-02-25
Attending: SURGERY | Admitting: SURGERY
Payer: COMMERCIAL

## 2020-02-25 ENCOUNTER — MEDICAL CORRESPONDENCE (OUTPATIENT)
Dept: HEALTH INFORMATION MANAGEMENT | Facility: CLINIC | Age: 50
End: 2020-02-25

## 2020-02-25 LAB
ALBUMIN SERPL-MCNC: 3.3 G/DL (ref 3.4–5)
ALP SERPL-CCNC: 88 U/L (ref 40–150)
ALT SERPL W P-5'-P-CCNC: 16 U/L (ref 0–70)
ANION GAP SERPL CALCULATED.3IONS-SCNC: 2 MMOL/L (ref 3–14)
AST SERPL W P-5'-P-CCNC: 10 U/L (ref 0–45)
BASOPHILS # BLD AUTO: 0 10E9/L (ref 0–0.2)
BASOPHILS NFR BLD AUTO: 0.4 %
BILIRUB DIRECT SERPL-MCNC: <0.1 MG/DL (ref 0–0.2)
BILIRUB SERPL-MCNC: 0.4 MG/DL (ref 0.2–1.3)
BUN SERPL-MCNC: 8 MG/DL (ref 7–30)
CALCIUM SERPL-MCNC: 8.2 MG/DL (ref 8.5–10.1)
CHLORIDE SERPL-SCNC: 113 MMOL/L (ref 94–109)
CO2 SERPL-SCNC: 29 MMOL/L (ref 20–32)
CREAT SERPL-MCNC: 0.64 MG/DL (ref 0.66–1.25)
DIFFERENTIAL METHOD BLD: ABNORMAL
EOSINOPHIL NFR BLD AUTO: 1.4 %
ERYTHROCYTE [DISTWIDTH] IN BLOOD BY AUTOMATED COUNT: 14.1 % (ref 10–15)
GFR SERPL CREATININE-BSD FRML MDRD: >90 ML/MIN/{1.73_M2}
GLUCOSE SERPL-MCNC: 87 MG/DL (ref 70–99)
HCT VFR BLD AUTO: 40.3 % (ref 40–53)
HGB BLD-MCNC: 13.2 G/DL (ref 13.3–17.7)
IMM GRANULOCYTES # BLD: 0 10E9/L (ref 0–0.4)
IMM GRANULOCYTES NFR BLD: 0.3 %
LYMPHOCYTES # BLD AUTO: 1.6 10E9/L (ref 0.8–5.3)
LYMPHOCYTES NFR BLD AUTO: 16.9 %
MAGNESIUM SERPL-MCNC: 1.9 MG/DL (ref 1.6–2.3)
MCH RBC QN AUTO: 30.2 PG (ref 26.5–33)
MCHC RBC AUTO-ENTMCNC: 32.8 G/DL (ref 31.5–36.5)
MCV RBC AUTO: 92 FL (ref 78–100)
MONOCYTES # BLD AUTO: 1 10E9/L (ref 0–1.3)
MONOCYTES NFR BLD AUTO: 10.1 %
NEUTROPHILS # BLD AUTO: 6.8 10E9/L (ref 1.6–8.3)
NEUTROPHILS NFR BLD AUTO: 70.9 %
NRBC # BLD AUTO: 0 10*3/UL
NRBC BLD AUTO-RTO: 0 /100
PHOSPHATE SERPL-MCNC: 3.2 MG/DL (ref 2.5–4.5)
PLATELET # BLD AUTO: 179 10E9/L (ref 150–450)
POTASSIUM SERPL-SCNC: 3.6 MMOL/L (ref 3.4–5.3)
PROT SERPL-MCNC: 6.9 G/DL (ref 6.8–8.8)
RBC # BLD AUTO: 4.37 10E12/L (ref 4.4–5.9)
SODIUM SERPL-SCNC: 144 MMOL/L (ref 133–144)
TRIGL SERPL-MCNC: 112 MG/DL
WBC # BLD AUTO: 9.5 10E9/L (ref 4–11)

## 2020-02-25 PROCEDURE — 85025 COMPLETE CBC W/AUTO DIFF WBC: CPT | Performed by: SURGERY

## 2020-02-25 PROCEDURE — 83735 ASSAY OF MAGNESIUM: CPT | Performed by: SURGERY

## 2020-02-25 PROCEDURE — 80053 COMPREHEN METABOLIC PANEL: CPT | Performed by: SURGERY

## 2020-02-25 PROCEDURE — 84100 ASSAY OF PHOSPHORUS: CPT | Performed by: SURGERY

## 2020-02-25 PROCEDURE — 82248 BILIRUBIN DIRECT: CPT | Performed by: SURGERY

## 2020-02-25 PROCEDURE — 84478 ASSAY OF TRIGLYCERIDES: CPT | Performed by: SURGERY

## 2020-02-25 RX ORDER — FENTANYL 25 UG/1
1 PATCH TRANSDERMAL
Qty: 15 PATCH | Refills: 0 | Status: SHIPPED | OUTPATIENT
Start: 2020-02-25 | End: 2020-03-23

## 2020-02-25 NOTE — TELEPHONE ENCOUNTER
Received call from patient requesting refill(s) of fentaNYL (DURAGESIC) 25 mcg/hr 72 hr patch     Last picked up from pharmacy on 02/03/20    Pt last seen by prescribing provider on 11/13/19    Next appt scheduled for 03/23/20     checked in the past 6 months? Yes If no, print current report and give to RN    Last urine drug screen date 09/11/19  Current opioid agreement on file (completed within the last year) Yes Date of opioid agreement: 09/11/19    Processing (pick one and delete the others):      E-prescribe to     [facility] [place], [place] - [address] [place] [address]  [address] [facility] [address]  Phone: [phone] Fax: [phone]    Will route to nursing pool for review and preparation of prescription(s).    Sahara Farrell [facility]  [place]

## 2020-02-25 NOTE — TELEPHONE ENCOUNTER
Medication refill information reviewed.     Due date for  fentaNYL (DURAGESIC) 25 mcg/hr 72 hr patch  is 3/6/20     Prescriptions prepped for review.     Will route to provider.

## 2020-02-25 NOTE — TELEPHONE ENCOUNTER
Script Eprescribed to pharmacy    Will send this to MA team to notify patient.    Signed Prescriptions:                        Disp   Refills    fentaNYL (DURAGESIC) 25 mcg/hr 72 hr patch 15 pat*0        Sig: Place 1 patch onto the skin every 48 hours remove old           patch.   May fill 3/4/20 and start 3/6/20  Authorizing Provider: BRANDYN JENKINS MD  [facility]

## 2020-02-26 ENCOUNTER — HOME INFUSION (PRE-WILLOW HOME INFUSION) (OUTPATIENT)
Dept: PHARMACY | Facility: CLINIC | Age: 50
End: 2020-02-26

## 2020-02-26 NOTE — H&P
Interventional Radiology Pre-Procedure Focused H&P Assessment     Reason for procedure: Malnutrition requiring TPN    History: Patient requiring PICC line for TPN, patient pulled previous PICC line out, see Epic for medical history. No other new pertinent history.     Surgical: see Epic for surgical history    Allergies: see Epic for updated allergy list    Medications: See Epic for current medication list    Family History: reviewed    Social History: reviewed     ROS: 10 point ROS negative other than noted above    Exam:  General: Pleasant, in no apparent distress  Skin: No redness, swelling, rashes, or drainage noted   Mallampati: Grade 2:  Soft palate, base of uvula, tonsillar pillars, and portion of posterior pharyngeal wall visible  Lungs: Lungs Clear with good breath sounds bilaterally  Heart: Normal heart sounds and rate  Abdomen: Soft, nontender    Per Brandona, PA-C

## 2020-02-27 NOTE — PROGRESS NOTES
This is a recent snapshot of the patient's [facility] medical record.  For current drug dose and complete information and questions, call [phone] or In [facility] pool, fv home infusion ([id number])  CSN Number:  [id number]

## 2020-03-02 DIAGNOSIS — F90.0 ADHD (ATTENTION DEFICIT HYPERACTIVITY DISORDER), INATTENTIVE TYPE: ICD-10-CM

## 2020-03-02 RX ORDER — DEXTROAMPHETAMINE SACCHARATE, AMPHETAMINE ASPARTATE, DEXTROAMPHETAMINE SULFATE, AND AMPHETAMINE SULFATE 5; 5; 5; 5 MG/1; MG/1; MG/1; MG/1
TABLET ORAL
Qty: 30 TABLET | Refills: 0 | Status: SHIPPED | OUTPATIENT
Start: 2020-03-02 | End: 2020-03-24

## 2020-03-02 RX ORDER — LORAZEPAM 1 MG/1
TABLET ORAL
Qty: 30 TABLET | Refills: 0 | Status: SHIPPED | OUTPATIENT
Start: 2020-03-02 | End: 2020-03-24

## 2020-03-02 NOTE — TELEPHONE ENCOUNTER
Lorazepam 1 MG       Last Written Prescription Date:  1/2/2020  Last Fill Quantity: 30,   # refills: 0  Last Office Visit: 11/25/19  Future Office visit:    Next 5 appointments (look out 90 days)    Mar 23, 2020 10:30 AM CDT  Return Visit with Patti Milligan MD  [facility] ([facility]) [address] [address] [address]  [phone]   Mar 24, 2020  1:00 PM CDT  PHYSICAL with Chuy Isaac MD  [facility] [address] [place] [id number] [phone]           Routing refill request to provider for review/approval because:  Drug not on the [facility], [facility] or [facility] refill protocol or controlled substance  Adderall 20 MG       Last Written Prescription Date:  1/29/2020  Last Fill Quantity: 30,   # refills: 0  Last Office Visit: 11/25/19  Future Office visit:    Next 5 appointments (look out 90 days)    Mar 23, 2020 10:30 AM CDT  Return Visit with Patti Milligan MD  [facility] ([facility]) [address] [facility] [address]  [phone]   Mar 24, 2020  1:00 PM CDT  PHYSICAL with Chuy Isaac MD  [facility] ([facility]) [address] [place] [phone]           Routing refill request to provider for review/approval because:  Drug not on the [facility], [facility] or [facility] refill protocol or controlled substance

## 2020-03-03 ENCOUNTER — HOSPITAL ENCOUNTER (OUTPATIENT)
Dept: LAB | Facility: CLINIC | Age: 50
Discharge: HOME | End: 2020-03-03
Attending: SURGERY | Admitting: SURGERY
Payer: COMMERCIAL

## 2020-03-03 LAB
ALBUMIN SERPL-MCNC: 3 G/DL (ref 3.4–5)
ALP SERPL-CCNC: 77 U/L (ref 40–150)
ALT SERPL W P-5'-P-CCNC: 15 U/L (ref 0–70)
ANION GAP SERPL CALCULATED.3IONS-SCNC: 8 MMOL/L (ref 3–14)
AST SERPL W P-5'-P-CCNC: 13 U/L (ref 0–45)
BASOPHILS # BLD AUTO: 0.1 10E9/L (ref 0–0.2)
BASOPHILS NFR BLD AUTO: 0.8 %
BILIRUB DIRECT SERPL-MCNC: <0.1 MG/DL (ref 0–0.2)
BILIRUB SERPL-MCNC: 0.2 MG/DL (ref 0.2–1.3)
BUN SERPL-MCNC: 8 MG/DL (ref 7–30)
CALCIUM SERPL-MCNC: 7.7 MG/DL (ref 8.5–10.1)
CHLORIDE SERPL-SCNC: 111 MMOL/L (ref 94–109)
CO2 SERPL-SCNC: 25 MMOL/L (ref 20–32)
CREAT SERPL-MCNC: 0.61 MG/DL (ref 0.66–1.25)
DIFFERENTIAL METHOD BLD: ABNORMAL
EOSINOPHIL NFR BLD AUTO: 3.1 %
ERYTHROCYTE [DISTWIDTH] IN BLOOD BY AUTOMATED COUNT: 14.7 % (ref 10–15)
GFR SERPL CREATININE-BSD FRML MDRD: >90 ML/MIN/{1.73_M2}
GLUCOSE SERPL-MCNC: 82 MG/DL (ref 70–99)
HCT VFR BLD AUTO: 36.4 % (ref 40–53)
HGB BLD-MCNC: 11.9 G/DL (ref 13.3–17.7)
IMM GRANULOCYTES # BLD: 0 10E9/L (ref 0–0.4)
IMM GRANULOCYTES NFR BLD: 0.4 %
LYMPHOCYTES # BLD AUTO: 2.4 10E9/L (ref 0.8–5.3)
LYMPHOCYTES NFR BLD AUTO: 32.1 %
MAGNESIUM SERPL-MCNC: 2.2 MG/DL (ref 1.6–2.3)
MCH RBC QN AUTO: 30 PG (ref 26.5–33)
MCHC RBC AUTO-ENTMCNC: 32.7 G/DL (ref 31.5–36.5)
MCV RBC AUTO: 92 FL (ref 78–100)
MONOCYTES # BLD AUTO: 0.9 10E9/L (ref 0–1.3)
MONOCYTES NFR BLD AUTO: 11.5 %
NEUTROPHILS # BLD AUTO: 3.9 10E9/L (ref 1.6–8.3)
NEUTROPHILS NFR BLD AUTO: 52.1 %
NRBC # BLD AUTO: 0 10*3/UL
NRBC BLD AUTO-RTO: 0 /100
PHOSPHATE SERPL-MCNC: 3.1 MG/DL (ref 2.5–4.5)
PLATELET # BLD AUTO: 180 10E9/L (ref 150–450)
POTASSIUM SERPL-SCNC: 3.7 MMOL/L (ref 3.4–5.3)
PROT SERPL-MCNC: 6.2 G/DL (ref 6.8–8.8)
RBC # BLD AUTO: 3.97 10E12/L (ref 4.4–5.9)
SODIUM SERPL-SCNC: 144 MMOL/L (ref 133–144)
TRIGL SERPL-MCNC: 132 MG/DL
WBC # BLD AUTO: 7.5 10E9/L (ref 4–11)

## 2020-03-03 PROCEDURE — 83735 ASSAY OF MAGNESIUM: CPT | Performed by: SURGERY

## 2020-03-03 PROCEDURE — 80053 COMPREHEN METABOLIC PANEL: CPT | Performed by: SURGERY

## 2020-03-03 PROCEDURE — 85025 COMPLETE CBC W/AUTO DIFF WBC: CPT | Performed by: SURGERY

## 2020-03-03 PROCEDURE — 82248 BILIRUBIN DIRECT: CPT | Performed by: SURGERY

## 2020-03-03 PROCEDURE — 84478 ASSAY OF TRIGLYCERIDES: CPT | Performed by: SURGERY

## 2020-03-03 PROCEDURE — 84100 ASSAY OF PHOSPHORUS: CPT | Performed by: SURGERY

## 2020-03-04 ENCOUNTER — HOME INFUSION (PRE-WILLOW HOME INFUSION) (OUTPATIENT)
Dept: PHARMACY | Facility: CLINIC | Age: 50
End: 2020-03-04

## 2020-03-04 ENCOUNTER — MYC REFILL (OUTPATIENT)
Dept: PALLIATIVE MEDICINE | Facility: CLINIC | Age: 50
End: 2020-03-04

## 2020-03-04 ENCOUNTER — PATIENT OUTREACH (OUTPATIENT)
Dept: CARE COORDINATION | Facility: CLINIC | Age: 50
End: 2020-03-04

## 2020-03-04 ENCOUNTER — MYC REFILL (OUTPATIENT)
Dept: INTERNAL MEDICINE | Facility: CLINIC | Age: 50
End: 2020-03-04

## 2020-03-04 DIAGNOSIS — Z98.84 GASTRIC BYPASS STATUS FOR OBESITY: ICD-10-CM

## 2020-03-04 DIAGNOSIS — R10.9 CHRONIC ABDOMINAL PAIN: ICD-10-CM

## 2020-03-04 DIAGNOSIS — G89.29 CHRONIC ABDOMINAL PAIN: ICD-10-CM

## 2020-03-04 DIAGNOSIS — Z79.891 ENCOUNTER FOR LONG-TERM OPIATE ANALGESIC USE: ICD-10-CM

## 2020-03-04 RX ORDER — OXYCODONE HCL 5 MG/5 ML
5-10 SOLUTION, ORAL ORAL 2 TIMES DAILY PRN
Qty: 600 ML | Refills: 0 | Status: CANCELLED | OUTPATIENT
Start: 2020-03-04

## 2020-03-04 RX ORDER — OXYCODONE HCL 5 MG/5 ML
5-10 SOLUTION, ORAL ORAL 2 TIMES DAILY PRN
Qty: 600 ML | Refills: 0 | Status: SHIPPED | OUTPATIENT
Start: 2020-03-04 | End: 2020-03-23

## 2020-03-04 NOTE — TELEPHONE ENCOUNTER
Medication refill information reviewed.     Due date for  oxyCODONE (ROXICODONE) 5 MG/5ML solution  is 3/13/20     Prescriptions prepped for review.     Will route to provider.

## 2020-03-04 NOTE — TELEPHONE ENCOUNTER
Script Eprescribed to pharmacy    Will send this to MA team to notify patient.    Signed Prescriptions:                        Disp   Refills    oxyCODONE (ROXICODONE) 5 MG/5ML solution   600 mL 0        Sig: Take 5-10 mLs (5-10 mg) by mouth 2 times daily as           needed for pain Max of 20mg/day. 30 day supply.           May fill on 3/11/20 to start on 3/13/20  Authorizing Provider: BRANDYN JENKINS MD  [facility]

## 2020-03-04 NOTE — TELEPHONE ENCOUNTER
lidocaine, alum & mag hydroxide-simethicone GI Cocktail    Last Written Prescription Date:  02/06/2020  Last Fill Quantity: 1 bottle,   # refills: 1  Last Office Visit: 11/25/19  Future Office visit:    Next 5 appointments (look out 90 days)    Mar 23, 2020 10:30 AM CDT  Return Visit with Patti Milligan MD  [facility] ([place] [facility]) [address] [facility] [address]  [phone]   Mar 24, 2020  1:00 PM CDT  PHYSICAL with Chuy Isaac MD  [facility] [address] [place] [phone]           Routing refill request to provider for review/approval because:  Drug not on the [facility], [facility] or [facility] refill protocol or controlled substance      oxyCODONE (ROXICODONE) 5 MG/5ML solution      Last Written Prescription Date:  02/10/2020  Last Fill Quantity: 600ml,   # refills: 0  Last Office Visit: 11/25/19  Future Office visit:    Next 5 appointments (look out 90 days)    Mar 23, 2020 10:30 AM CDT  Return Visit with Patti Milligan MD  [facility] ([facility]) [address]  [place] [id number] [phone]   Mar 24, 2020  1:00 PM CDT  PHYSICAL with Chuy Isaac MD  [facility] [address] [place] [phone]           Routing refill request to provider for review/approval because:  Drug not on the [facility], [facility] or [facility] refill protocol or controlled substance

## 2020-03-04 NOTE — LETTER
[facility]  Complex Care Plan  About Me:    Patient Name:  Parker Acevedo    YOB: 1970  Age:         49 year old   [place] MRN:    [id number] Telephone Information:  Home Phone [phone]   Mobile [phone]       Address:  [address] [place] [place] [address] Email address:  [email or web address]      Emergency Contact(s)    Name Relationship Lgl Grd Work Phone Home Phone Mobile Phone   1. RACHEL, BERTRAND* Spouse No  [phone]   2. JEYSON CAIN * Relative No  [phone]   3. [place]* Mother No  [phone]           Primary language:  English     needed? No   [place] [facility]:  [phone] op. 1  Other communication barriers: Glasses, Physical impairment  Preferred Method of Communication:  Chris  Current living arrangement: I live in a private home with spouse  Mobility Status/ Medical Equipment: Independent    Health Maintenance  Health Maintenance Reviewed: Due/Overdue   Health Maintenance Due   Topic Date Due     MEDICARE ANNUAL WELLNESS VISIT  06/21/2017     TOBACCO CESSATION COUNSELING  02/06/2019     PHQ-2  01/01/2020        My Access Plan  Medical Emergency 911   Primary Clinic Line [facility] - [phone]   24 Hour Appointment Line [phone] or  [phone]) (toll-free)   24 Hour Nurse Line [phone] (toll-free)   Preferred Urgent Care Other   Preferred Hospital [facility]  [phone]   Preferred Pharmacy [place] [facility] [address]     Behavioral Health Crisis Line The National Suicide Prevention Lifeline at 1-495.736.2664 or 911             My Care Team Members  Patient Care Team       Relationship Specialty Notifications Start End    Chuy Isaac MD PCP - General Internal Medicine  10/30/18     Phone: [phone] Fax: [phone]  [facility] [address]    Francis Vyas MD Referring Physician Surgery  4/9/15     GI     Phone: [phone] Fax: [phone]         [address] [facility] [address]    Bill Brumfield MD MD Gastroenterology  6/2/15     Phone: [phone] Fax: [phone]         [address] [facility] [address]    Patti Milligan MD MD Pain Clinic  6/2/15     Phone: [phone] Fax: [phone]         [address] [place] [address] [facility] [address]    Behl, Melissa K, RN Lead Care Coordinator Primary Care - CC Admissions 3/28/18     Phone: [phone] Fax: [phone]        Chuy Isaac MD Assigned PCP   11/11/18     Phone: [phone] Fax: [phone]         [address] [facility] [address]    Marlyn Jenkins MD MD Ophthalmology  1/29/19     Phone: [phone] Fax: [phone]         [address] [facility] [address] [facility] [address]    Marlyn Jenkins MD MD Ophthalmology  2/11/19     Phone: [phone] Fax: [phone]         [address] [facility] [address] [facility] [address]            My Care Plans  Self Management and Treatment Plan  Goals and (Comments)  Goals        General    #1 Medical (pt-stated)     Notes - Note edited  1/30/2020  2:28 PM by Behl, Melissa K, RN    Goal Statement: I will see cardiology.  Measure of Success: Appointment will be scheduled and attended.  Supportive Steps to Achieve: Review of hospital discharge instructions.  Barriers: multiple specialists and appointments  Strengths: care coordination, supportive spouse  Date to Achieve By: 3/31/20  Patient expressed understanding of goal: yes         #2 Medical (pt-stated)     Notes - Note edited  3/5/2020  2:22 PM by Behl, Melissa K, RN    Goal Statement: I will see surgery as instructed.  Measure of Success: Appointment will be scheduled and attended.  Supportive Steps to Achieve: Review of hospital discharge instructions.  Barriers: multiple  specialists and discharge instructions.  Strengths: care coordination, supportive spouse  Date to Achieve By: 4/5/20  Patient expressed understanding of goal: yes                Action Plans on File:                       Advance Care Plans/Directives Type:   Type Advanced Care Plans/Directives: Advanced Directive - On File    My Medical and Care Information  Problem List   Patient Active Problem List   Diagnosis     Peptic ulcer disease     Gastric bypass status for obesity     Chronic abdominal pain     Vomiting     Anemia     ADHD (attention deficit hyperactivity disorder), inattentive type     Vitamin B12 deficiency without anemia     Thiamine deficiency     Dehydration     Dysphagia     Weight loss, non-intentional     Malnutrition (H)     Chronic anxiety     Constipation     Bile reflux esophagitis     Former smoker     Vitamin D deficiency     Iron deficiency     Health Care Home     Chronic pain     Insomnia     Positive blood culture     Fungemia     Chronic nausea     Gastrostomy tube in place (H)     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)     Short gut syndrome     Anxiety     Status post cervical spinal arthrodesis     Port or reservoir infection, initial encounter     Abnormal echocardiogram     Low serum iron     Anemia, iron deficiency     Catheter-related bloodstream infection (CRBSI)     Generalized weakness     S/P bariatric surgery     Hyperlipidemia LDL goal <130     Bacteremia     Infection by Candida species     PICC line infiltration, sequela     Gram-positive bacteremia     On total parenteral nutrition      Current Medications and Allergies:  See printed Medication Report.    Care Coordination Start Date: 1/10/2019   Frequency of Care Coordination: monthly   Form Last Updated: 03/05/2020

## 2020-03-04 NOTE — TELEPHONE ENCOUNTER
Patient requesting refill(s) of oxyCODONE (ROXICODONE) 5 MG/5ML solution   Last dispensed from pharmacy on 02/10/2020    Pt last seen by prescribing provider on 11/13/2019  Next appt scheduled for 03/23/2020     checked in the past 6 months? Yes If no, print current report and give to RN    Last urine drug screen date 09/11/2019  Current opioid agreement on file (completed within the last year) Yes Date of opioid agreement: 09/11/2019    Processing (pick one and delete the others):      E-prescribe to [facility] [place], [place]  pharmacy    Will route to nursing pool for review and preparation of prescription(s).

## 2020-03-04 NOTE — PROGRESS NOTES
Clinic Care Coordination Contact  [facility]/Voicemail       Clinical Data: Care Coordinator Outreach  Outreach attempted x 1.  Left message on patient's voicemail with call back information and requested return call.  Plan: Care Coordinator will try to reach patient again in approximately 10 business days.    Melissa Behl BSN, RN, PHN, [facility]  Primary Care Clinical RN Care Coordinator  [facility]   [phone]

## 2020-03-05 ASSESSMENT — ACTIVITIES OF DAILY LIVING (ADL): DEPENDENT_IADLS:: MEDICATION MANAGEMENT;TRANSPORTATION;SHOPPING

## 2020-03-05 NOTE — PROGRESS NOTES
"Clinic Care Coordination Contact    Follow Up Progress Note      Assessment: RN CC spoke with patient's spouse Rose (consent to communicate on file) for follow up.  Patient has new PICC line placed as of 2/21/20.  Rose informed writer patient will have to go to the hospital for PICC placements in the future due to needing anesthesia.    Patient has future appointments with PCP scheduled for 3/24/20 and pain clinic 3/23/20.  Patient's pain clinic provider went up on his oxycodone daily dose to 20 mg per day and patient's pain has improved slightly per Rose.  Rose had patient rate his pain while on the phone with writer and patient rated his pain 8-9/10 due to \"bile back-up.\"  Rose states patient has been needing to vent his G-tube 3-4 times per day.  Patient has not scheduled a f/u with surgery as previously instructed.  Rose unable to state reason, but states she will work on getting this scheduled.  Rose informed writer she thought patient's cardiology appointment was scheduled for some time in March.    Goals addressed this encounter:   Goals Addressed                 This Visit's Progress      #1 Medical (pt-stated)   On track     Goal Statement: I will see cardiology.  Measure of Success: Appointment will be scheduled and attended.  Supportive Steps to Achieve: Review of hospital discharge instructions.  Barriers: multiple specialists and appointments  Strengths: care coordination, supportive spouse  Date to Achieve By: 3/31/20  Patient expressed understanding of goal: yes           #2 Medical (pt-stated)   Not on track     Goal Statement: I will see surgery as instructed.  Measure of Success: Appointment will be scheduled and attended.  Supportive Steps to Achieve: Review of hospital discharge instructions.  Barriers: multiple specialists and discharge instructions.  Strengths: care coordination, supportive spouse  Date to Achieve By: 4/5/20  Patient expressed understanding of goal: yes         "        Intervention/Education provided during outreach: RN CC reviewed plan of care; needed appointments with cardiology and surgery     Outreach Frequency: monthly    Plan:   1. Patient will schedule an appointment with bariatric surgery team and cardiology.  2. Patient will attend future appointments with PCP and pain clinic.  3. RN Care Coordinator will follow up in 1 month.    Melissa Behl BSN, RN, PHN, [facility]  Primary Care Clinical RN Care Coordinator  [facility]   [phone]

## 2020-03-06 NOTE — PROGRESS NOTES
This is a recent snapshot of the patient's [facility] medical record.  For current drug dose and complete information and questions, call [phone] or In Basket pool, [facility] home infusion ([id number])  CSN Number:  [id number]

## 2020-03-10 ENCOUNTER — MEDICAL CORRESPONDENCE (OUTPATIENT)
Dept: HEALTH INFORMATION MANAGEMENT | Facility: CLINIC | Age: 50
End: 2020-03-10

## 2020-03-10 ENCOUNTER — HOSPITAL ENCOUNTER (OUTPATIENT)
Dept: LAB | Facility: CLINIC | Age: 50
Discharge: HOME | End: 2020-03-10
Attending: SURGERY | Admitting: SURGERY
Payer: COMMERCIAL

## 2020-03-10 LAB
ALBUMIN SERPL-MCNC: 3.1 G/DL (ref 3.4–5)
ALP SERPL-CCNC: 73 U/L (ref 40–150)
ALT SERPL W P-5'-P-CCNC: 15 U/L (ref 0–70)
ANION GAP SERPL CALCULATED.3IONS-SCNC: 4 MMOL/L (ref 3–14)
AST SERPL W P-5'-P-CCNC: 13 U/L (ref 0–45)
BASOPHILS # BLD AUTO: 0 10E9/L (ref 0–0.2)
BASOPHILS NFR BLD AUTO: 0.2 %
BILIRUB DIRECT SERPL-MCNC: 0.2 MG/DL (ref 0–0.2)
BILIRUB SERPL-MCNC: 0.7 MG/DL (ref 0.2–1.3)
BUN SERPL-MCNC: 11 MG/DL (ref 7–30)
CALCIUM SERPL-MCNC: 8.2 MG/DL (ref 8.5–10.1)
CHLORIDE SERPL-SCNC: 110 MMOL/L (ref 94–109)
CO2 SERPL-SCNC: 28 MMOL/L (ref 20–32)
CREAT SERPL-MCNC: 0.65 MG/DL (ref 0.66–1.25)
DIFFERENTIAL METHOD BLD: ABNORMAL
EOSINOPHIL # BLD AUTO: 0.2 10E9/L (ref 0–0.7)
EOSINOPHIL NFR BLD AUTO: 4 %
ERYTHROCYTE [DISTWIDTH] IN BLOOD BY AUTOMATED COUNT: 15 % (ref 10–15)
GFR SERPL CREATININE-BSD FRML MDRD: >90 ML/MIN/{1.73_M2}
GLUCOSE SERPL-MCNC: 82 MG/DL (ref 70–99)
HCT VFR BLD AUTO: 34.3 % (ref 40–53)
HGB BLD-MCNC: 10.8 G/DL (ref 13.3–17.7)
LYMPHOCYTES # BLD AUTO: 1.5 10E9/L (ref 0.8–5.3)
LYMPHOCYTES NFR BLD AUTO: 26.2 %
MAGNESIUM SERPL-MCNC: 2.1 MG/DL (ref 1.6–2.3)
MCH RBC QN AUTO: 30.2 PG (ref 26.5–33)
MCHC RBC AUTO-ENTMCNC: 31.5 G/DL (ref 31.5–36.5)
MCV RBC AUTO: 96 FL (ref 78–100)
MONOCYTES # BLD AUTO: 0.9 10E9/L (ref 0–1.3)
MONOCYTES NFR BLD AUTO: 15.7 %
NEUTROPHILS # BLD AUTO: 3.1 10E9/L (ref 1.6–8.3)
NEUTROPHILS NFR BLD AUTO: 53.9 %
PHOSPHATE SERPL-MCNC: 2.8 MG/DL (ref 2.5–4.5)
PLATELET # BLD AUTO: 170 10E9/L (ref 150–450)
POTASSIUM SERPL-SCNC: 3.5 MMOL/L (ref 3.4–5.3)
PROT SERPL-MCNC: 6.6 G/DL (ref 6.8–8.8)
RBC # BLD AUTO: 3.58 10E12/L (ref 4.4–5.9)
SODIUM SERPL-SCNC: 142 MMOL/L (ref 133–144)
TRIGL SERPL-MCNC: 53 MG/DL
WBC # BLD AUTO: 5.7 10E9/L (ref 4–11)

## 2020-03-10 PROCEDURE — 85025 COMPLETE CBC W/AUTO DIFF WBC: CPT | Performed by: SURGERY

## 2020-03-10 PROCEDURE — 84100 ASSAY OF PHOSPHORUS: CPT | Performed by: SURGERY

## 2020-03-10 PROCEDURE — 83735 ASSAY OF MAGNESIUM: CPT | Performed by: SURGERY

## 2020-03-10 PROCEDURE — 82248 BILIRUBIN DIRECT: CPT | Performed by: SURGERY

## 2020-03-10 PROCEDURE — 80053 COMPREHEN METABOLIC PANEL: CPT | Performed by: SURGERY

## 2020-03-10 PROCEDURE — 84478 ASSAY OF TRIGLYCERIDES: CPT | Performed by: SURGERY

## 2020-03-12 ENCOUNTER — HOME INFUSION (PRE-WILLOW HOME INFUSION) (OUTPATIENT)
Dept: PHARMACY | Facility: CLINIC | Age: 50
End: 2020-03-12

## 2020-03-13 NOTE — PROGRESS NOTES
This is a recent snapshot of the patient's [facility] medical record.  For current drug dose and complete information and questions, call [phone] or In Basket pool, [facility]v home infusion ([id number])  CSN Number:  [id number]

## 2020-03-16 ENCOUNTER — HOME INFUSION (PRE-WILLOW HOME INFUSION) (OUTPATIENT)
Dept: PHARMACY | Facility: CLINIC | Age: 50
End: 2020-03-16

## 2020-03-17 ENCOUNTER — HOSPITAL ENCOUNTER (OUTPATIENT)
Dept: LAB | Facility: CLINIC | Age: 50
Discharge: HOME | End: 2020-03-17
Attending: SURGERY | Admitting: SURGERY
Payer: COMMERCIAL

## 2020-03-17 ENCOUNTER — MYC MEDICAL ADVICE (OUTPATIENT)
Dept: PALLIATIVE MEDICINE | Facility: CLINIC | Age: 50
End: 2020-03-17

## 2020-03-17 ENCOUNTER — MYC MEDICAL ADVICE (OUTPATIENT)
Dept: INTERNAL MEDICINE | Facility: CLINIC | Age: 50
End: 2020-03-17

## 2020-03-17 ENCOUNTER — MEDICAL CORRESPONDENCE (OUTPATIENT)
Dept: HEALTH INFORMATION MANAGEMENT | Facility: CLINIC | Age: 50
End: 2020-03-17

## 2020-03-17 LAB
ALBUMIN SERPL-MCNC: 3.1 G/DL (ref 3.4–5)
ALP SERPL-CCNC: 68 U/L (ref 40–150)
ALT SERPL W P-5'-P-CCNC: 15 U/L (ref 0–70)
ANION GAP SERPL CALCULATED.3IONS-SCNC: 2 MMOL/L (ref 3–14)
AST SERPL W P-5'-P-CCNC: 9 U/L (ref 0–45)
BASOPHILS # BLD AUTO: 0 10E9/L (ref 0–0.2)
BASOPHILS NFR BLD AUTO: 0.5 %
BILIRUB DIRECT SERPL-MCNC: 0.1 MG/DL (ref 0–0.2)
BILIRUB SERPL-MCNC: 0.3 MG/DL (ref 0.2–1.3)
BUN SERPL-MCNC: 13 MG/DL (ref 7–30)
CALCIUM SERPL-MCNC: 8.1 MG/DL (ref 8.5–10.1)
CHLORIDE SERPL-SCNC: 112 MMOL/L (ref 94–109)
CO2 SERPL-SCNC: 30 MMOL/L (ref 20–32)
CREAT SERPL-MCNC: 0.68 MG/DL (ref 0.66–1.25)
DIFFERENTIAL METHOD BLD: ABNORMAL
EOSINOPHIL NFR BLD AUTO: 3.7 %
ERYTHROCYTE [DISTWIDTH] IN BLOOD BY AUTOMATED COUNT: 14.6 % (ref 10–15)
GFR SERPL CREATININE-BSD FRML MDRD: >90 ML/MIN/{1.73_M2}
GLUCOSE SERPL-MCNC: 91 MG/DL (ref 70–99)
HCT VFR BLD AUTO: 35.6 % (ref 40–53)
HGB BLD-MCNC: 11.3 G/DL (ref 13.3–17.7)
IMM GRANULOCYTES # BLD: 0 10E9/L (ref 0–0.4)
IMM GRANULOCYTES NFR BLD: 0.2 %
LYMPHOCYTES # BLD AUTO: 1.7 10E9/L (ref 0.8–5.3)
LYMPHOCYTES NFR BLD AUTO: 27.2 %
MAGNESIUM SERPL-MCNC: 2 MG/DL (ref 1.6–2.3)
MCH RBC QN AUTO: 29.7 PG (ref 26.5–33)
MCHC RBC AUTO-ENTMCNC: 31.7 G/DL (ref 31.5–36.5)
MCV RBC AUTO: 94 FL (ref 78–100)
MONOCYTES # BLD AUTO: 0.9 10E9/L (ref 0–1.3)
MONOCYTES NFR BLD AUTO: 14.1 %
NEUTROPHILS # BLD AUTO: 3.4 10E9/L (ref 1.6–8.3)
NEUTROPHILS NFR BLD AUTO: 54.3 %
NRBC # BLD AUTO: 0 10*3/UL
NRBC BLD AUTO-RTO: 0 /100
PHOSPHATE SERPL-MCNC: 3.1 MG/DL (ref 2.5–4.5)
PLATELET # BLD AUTO: 122 10E9/L (ref 150–450)
POTASSIUM SERPL-SCNC: 3.8 MMOL/L (ref 3.4–5.3)
PROT SERPL-MCNC: 6.5 G/DL (ref 6.8–8.8)
RBC # BLD AUTO: 3.8 10E12/L (ref 4.4–5.9)
SODIUM SERPL-SCNC: 144 MMOL/L (ref 133–144)
TRIGL SERPL-MCNC: 78 MG/DL
WBC # BLD AUTO: 6.2 10E9/L (ref 4–11)

## 2020-03-17 PROCEDURE — 82248 BILIRUBIN DIRECT: CPT | Performed by: SURGERY

## 2020-03-17 PROCEDURE — 84100 ASSAY OF PHOSPHORUS: CPT | Performed by: SURGERY

## 2020-03-17 PROCEDURE — 84478 ASSAY OF TRIGLYCERIDES: CPT | Performed by: SURGERY

## 2020-03-17 PROCEDURE — 80053 COMPREHEN METABOLIC PANEL: CPT | Performed by: SURGERY

## 2020-03-17 PROCEDURE — 85025 COMPLETE CBC W/AUTO DIFF WBC: CPT | Performed by: SURGERY

## 2020-03-17 PROCEDURE — 83735 ASSAY OF MAGNESIUM: CPT | Performed by: SURGERY

## 2020-03-17 NOTE — PROGRESS NOTES
This is a recent snapshot of the patient's [facility] medical record.  For current drug dose and complete information and questions, call [phone] or In Basket pool, [facility]v home infusion ([id number])  CSN Number:  [id number]

## 2020-03-17 NOTE — TELEPHONE ENCOUNTER
Patient's appointment is changed to a phone visit.    Pratibha Ferris, [facility]

## 2020-03-19 ENCOUNTER — TELEPHONE (OUTPATIENT)
Dept: PALLIATIVE MEDICINE | Facility: CLINIC | Age: 50
End: 2020-03-19

## 2020-03-19 NOTE — TELEPHONE ENCOUNTER
Patient's return visit will be converted to Telephone Visit due to COVID-19.    Called patient and spoke with pt's wife Rose. She said that they had send a [employer] message asking for this and is glad that it is an option. Explained:    We re taking every precaution to prevent the spread of COVID-19. Our top priority is to protect and care for our patients.    After review by your provider, we are changing your visit to a telephone appointment.    Your visit is at Monday 3/23 at 10:30 am.  We will be calling you 15-20 min early for check-in with the MA. Please be available at [phone] for this check in.    Confirm number that the patient wants us to call for this appointment: [phone]    If you have concerns about this plan, please let us know, and we will send a message to the nurses to further discuss your concerns.    Gabbi Heath, YADIRAN, RN-BC  Patient Care Supervisor  [facility] [place]

## 2020-03-23 ENCOUNTER — VIRTUAL VISIT (OUTPATIENT)
Dept: PALLIATIVE MEDICINE | Facility: CLINIC | Age: 50
End: 2020-03-23
Payer: COMMERCIAL

## 2020-03-23 DIAGNOSIS — E43 SEVERE MALNUTRITION (H): ICD-10-CM

## 2020-03-23 DIAGNOSIS — R10.9 CHRONIC ABDOMINAL PAIN: Primary | ICD-10-CM

## 2020-03-23 DIAGNOSIS — G89.29 CHRONIC ABDOMINAL PAIN: Primary | ICD-10-CM

## 2020-03-23 DIAGNOSIS — G89.4 CHRONIC PAIN SYNDROME: ICD-10-CM

## 2020-03-23 PROCEDURE — G2012 BRIEF CHECK IN BY MD/QHP: HCPCS | Performed by: PSYCHIATRY & NEUROLOGY

## 2020-03-23 RX ORDER — POLYETHYLENE GLYCOL 3350 17 G/17G
1 POWDER, FOR SOLUTION ORAL DAILY
Qty: 578 G | Refills: 11 | Status: SHIPPED | OUTPATIENT
Start: 2020-03-23 | End: 2020-07-28

## 2020-03-23 RX ORDER — OXYCODONE HCL 5 MG/5 ML
5-10 SOLUTION, ORAL ORAL 2 TIMES DAILY PRN
Qty: 600 ML | Refills: 0 | Status: SHIPPED | OUTPATIENT
Start: 2020-03-23 | End: 2020-05-04

## 2020-03-23 RX ORDER — FENTANYL 25 UG/1
1 PATCH TRANSDERMAL
Qty: 15 PATCH | Refills: 0 | Status: SHIPPED | OUTPATIENT
Start: 2020-03-23 | End: 2020-04-27

## 2020-03-23 ASSESSMENT — PAIN SCALES - GENERAL: PAINLEVEL: MODERATE PAIN (4)

## 2020-03-23 NOTE — PROGRESS NOTES
The patient has been notified of following:     This telephone visit will be conducted via a call between you and your provider. We have found that certain health care needs can be provided without the need for a physical exam.  This service lets us provide the care you need with a phone conversation.  If a prescription is necessary we can send it directly to your pharmacy.  If lab work is needed we can place an order for that and you can then stop by our lab to have the test done at a later time. This is a billable service but we do not know the cost at this time.     Angeline Calvert on 3/23/2020 at 9:07 AM

## 2020-03-23 NOTE — PROGRESS NOTES
[facility]  Parker Acevedo is a 49 year old male who is being evaluated via a billable telephone visit      Date of visit: 11/13/2019     Chief complaint:    Chief Complaint   Patient presents with     Pain     Telephone visit due to COVID-19       Interval history:  Parker Acevedo was last seen by me on 9/11/2019     Recommendations/plan at the last visit included:  1. Physical therapy- discussed continuing this   2. Allowing 5 mg oxycodone/day- max dose 15mg.  3. Follow up in 2-3 months.  4. Parekr to follow up with Primary Care provider regarding bladder incontinence.  Parker to follow up with Primary Care provider regarding elevated blood pressure. Only mildly elevated      Since his last visit, Parker Acevedo reports:  -he states he is doing better with the slightly higher dose.    -he is doing 10mg twice daily  -he is having bad reflux and draining.    -continues to have ankle pain. The orthotics just got in, so he hasn't picked them up.    -he has home nursing coming in to do blood cultures and other treatmenst.    Pain scores:  Average pain intensity  4/10    Current pain treatments:   Fentanyl 25mcg/hr patch q48 hours -  Previously was at 50mcg/hr  Lidocaine patches- as needed  Naloxone- has from previous hospitalization.  Oxycodone max of 20mg a day.    Previous medication treatments included:   Valium 5 mg/day, 1 tab in AM and PM-stopped in 11/2017  Tylenol #3   Vicodin   Tramadol   Diazepam- for sleep/anxiety  Gabapentin- bad headaches, acid reflux  Oxycodone max of 45mg/day.        Side Effects: no side effects    Medications:  Current Outpatient Medications   Medication Sig Dispense Refill     acetaminophen (TYLENOL) 32 mg/mL liquid Take 500 mg by mouth every 4 hours as needed for fever or mild pain       ADDERALL 20 MG tablet TAKE ONE TABLET BY MOUTH ONCE DAILY 30 tablet 0     albuterol (PROAIR HFA/PROVENTIL HFA/VENTOLIN HFA) 108 (90  "Base) MCG/ACT inhaler Inhale 2 puffs into the lungs every 4 hours as needed for shortness of breath / dyspnea or wheezing 1 Inhaler 1     carvedilol (COREG) 6.25 MG tablet Take 6.25 mg by mouth 2 times daily (with meals)       cyanocobalamin (CYANOCOBALAMIN) 1000 MCG/ML injection Inject 1 mL (1,000 mcg) into the muscle every 30 days 1 mL 11     fentaNYL (DURAGESIC) 25 mcg/hr 72 hr patch Place 1 patch onto the skin every 48 hours remove old patch.   May fill 3/4/20 and start 3/6/20 15 patch 0     lidocaine (LIDODERM) 5 % Patch Place 1-2 patches onto the skin every 24 hours Wear for 12 hours, remove for 12 hours.  OK to cut to better fit to size. 60 patch 6     lidocaine, alum & mag hydroxide-simethicone GI Cocktail 30 ml daily as needed for mouth/stomach pain. 1 Bottle 1     LORazepam (ATIVAN) 1 MG tablet TAKE 1 TABLET (1MG) BY MOUTH AS NEEDED FOR ANXIETY WITH TPN AND MEDICATIONS . JUST ONCE A DAY (30 TO LAST 30 DAYS) 30 tablet 0     Needle, Disp, (BD DISP NEEDLES) 27G X 1/2\" MISC 1 Device every 30 days Use for cyanocobalamin injection once q 30 days. 3 each 4     ondansetron (ZOFRAN-ODT) 8 MG ODT tab DISSOLVE ONE TABLET ON TONGUE EVERY 8 HOURS AS NEEDED FOR NAUSEA 90 tablet 1     oxyCODONE (ROXICODONE) 5 MG/5ML solution Take 5-10 mLs (5-10 mg) by mouth 2 times daily as needed for pain Max of 20mg/day. 30 day supply. May fill on 3/11/20 to start on 3/13/20 600 mL 0     pantoprazole (PROTONIX) 40 MG EC tablet Take 1 tablet (40 mg) by mouth daily 30 tablet 8     polyethylene glycol (MIRALAX/GLYCOLAX) powder Take 17 g (1 capful) by mouth daily 578 g 11     sucralfate (CARAFATE) 1 GM tablet Take 1 tablet (1 g) by mouth 4 times daily 120 tablet 3     vitamin D2 (ERGOCALCIFEROL) 27598 units (1250 mcg) capsule Take 1 capsule (50,000 Units) by mouth once a week 12 capsule 3     [place] [facility] MANAGED PATIENT Contact [facility] for patient specific medication information at [phone] on admission and " "discharge from the hospital.  Phones are answered 24 hours a day 7 days a week 365 days a year.    Providers - Choose \"CONTINUE HOME MED (no script)\" at discharge if patient treatment with home infusion will continue.       naloxone (NARCAN) 4 MG/0.1ML nasal spray Spray 4 mg into one nostril alternating nostrils once as needed every 2-3 minutes until assistance arrives       order for DME Equipment being ordered: Urine Drainage bag, leg. 15 Units 11     order for DME Equipment being ordered: Bilateral knee high chronic venous insufficiency stockings--  mild-moderate pressures. 4 each 5     sodium chloride 0.9% infusion Inject 1,000-2,000 mLs into the vein as needed        UNABLE TO FIND States takes TPN 2050 ml  Every 14 hours through PICC         Medical History: any changes in medical history since they were last seen? no    Review of Systems:  The 14 system ROS was reviewed from the intake questionnaire, and is positive for: Constitutional: fever/chills, fatigue, weight gain, weight loss  Eyes/Head: headache, dizziness  ENT: ringing in ears  Allergy/Immune: allergies  Skin: itching, rash, hives  Hematologic: easy bruising  Respiratory: cough, wheezing, shortness of breath  Cardiovascular: swelling in feet, fainting, palpitations, chest pain  GI: abdominal pain, nausea, vomiting, diarrhea, constipation  Endocrine: steroid use  Musculoskeletal:  joint pain, arthritis, stiffness, gout, back pain, neck pain  Urinary: frequency, urgency, incontinence, hesitancy  Neurologic: weakness, numbness/tingling, seizure, stroke, memory loss  Mental health: depression, anxiety, stress, suicidal ideation      THE 4 A's OF OPIOID MAINTENANCE ANALGESIA     Analgesia: on fentanyl alone, not getting enough coverage    Activity: on disability    Adverse effects: none    Adherence to Rx protocol: good- [place] Prescription Monitoring Program checked 3/23/20 appropriate  Last UDS and opioid agreement - 9/11/2019    Assessment:   1. Chronic " abdominal pain, with history of gastric bypass and later peptic ulcer   2. G tube placement- only used for venting  3. Myofascial abdominal pain, with significant guarding and poor posture  4. Opioid tolerance  5. Anxiety  6. Foot pain with tendonous injury- healed  7. Left arm weakness, consistent with radial nerve injury- improving  8. Port placement- h/o recurrent infections, getting TPN        Plan:   1. Physical therapy- not at this time with COVID  2. Medications:  1. No changes.  We have discussed concerns about any further increase of meds.  3. Follow up in 3 months- telephone.    Phone call duration: 14 minutes    Jessica Milligan MD  [facility]

## 2020-03-24 ENCOUNTER — MYC MEDICAL ADVICE (OUTPATIENT)
Dept: CARE COORDINATION | Facility: CLINIC | Age: 50
End: 2020-03-24

## 2020-03-24 ENCOUNTER — PATIENT OUTREACH (OUTPATIENT)
Dept: CARE COORDINATION | Facility: CLINIC | Age: 50
End: 2020-03-24

## 2020-03-24 ENCOUNTER — VIRTUAL VISIT (OUTPATIENT)
Dept: INTERNAL MEDICINE | Facility: CLINIC | Age: 50
End: 2020-03-24
Payer: COMMERCIAL

## 2020-03-24 DIAGNOSIS — F41.9 ANXIETY: ICD-10-CM

## 2020-03-24 DIAGNOSIS — E44.0 MODERATE PROTEIN-CALORIE MALNUTRITION (H): Primary | ICD-10-CM

## 2020-03-24 DIAGNOSIS — R10.9 CHRONIC ABDOMINAL PAIN: ICD-10-CM

## 2020-03-24 DIAGNOSIS — Z98.84 S/P BARIATRIC SURGERY: Primary | ICD-10-CM

## 2020-03-24 DIAGNOSIS — G89.29 CHRONIC ABDOMINAL PAIN: ICD-10-CM

## 2020-03-24 DIAGNOSIS — F90.0 ADHD (ATTENTION DEFICIT HYPERACTIVITY DISORDER), INATTENTIVE TYPE: ICD-10-CM

## 2020-03-24 DIAGNOSIS — E46 MALNUTRITION, UNSPECIFIED TYPE (H): ICD-10-CM

## 2020-03-24 PROCEDURE — 99214 OFFICE O/P EST MOD 30 MIN: CPT | Mod: TEL | Performed by: INTERNAL MEDICINE

## 2020-03-24 RX ORDER — LORAZEPAM 1 MG/1
TABLET ORAL
Qty: 30 TABLET | Refills: 0 | Status: SHIPPED | OUTPATIENT
Start: 2020-03-24 | End: 2020-04-25

## 2020-03-24 RX ORDER — DEXTROAMPHETAMINE SACCHARATE, AMPHETAMINE ASPARTATE, DEXTROAMPHETAMINE SULFATE AND AMPHETAMINE SULFATE 5; 5; 5; 5 MG/1; MG/1; MG/1; MG/1
TABLET ORAL
Qty: 30 TABLET | Refills: 0 | Status: SHIPPED | OUTPATIENT
Start: 2020-03-24 | End: 2020-04-25

## 2020-03-24 NOTE — PROGRESS NOTES
"Parker Acevedo is a 49 year old male who is being evaluated via a billable telephone visit.      The patient has been notified of following:     \"This telephone visit will be conducted via a call between you and your physician/provider. We have found that certain health care needs can be provided without the need for a physical exam.  This service lets us provide the care you need with a short phone conversation.  If a prescription is necessary we can send it directly to your pharmacy.  If lab work is needed we can place an order for that and you can then stop by our lab to have the test done at a later time.    If during the course of the call the physician/provider feels a telephone visit is not appropriate, you will not be charged for this service.\"     Parker Acevedo complains of    Chief Complaint   Patient presents with     Telephone     follow up on medications and health     Had a phone call visit for the pain clinic yesterday. Continue with medications(narcotics)there.    PICC line fell out last night.  Called the [facility].  Trying to get replaced at [facility].  Needs the PICC for fluids and TPN 8 hours a day.      Taking Adderall every day once in the morning, helps him concentrate.    Ativan is at night when he can't sleep.        I have reviewed and updated the patient's Past Medical History, Social History, Family History and Medication List.    ALLERGIES  Bactrim [sulfamethoxazole w/trimethoprim]; Penicillins; Doxycycline; and Vancomycin      Additional provider notes:     Assessment/Plan:  1. ADHD (attention deficit hyperactivity disorder), inattentive type  Patient is stable with his ADHD.  He has been on Adderall 20 mg a day for quite some time we will refill this to be filled on April 2.  - LORazepam (ATIVAN) 1 MG tablet; TAKE 1 TABLET (1MG) BY MOUTH AS NEEDED FOR ANXIETY WITH TPN AND MEDICATIONS . JUST ONCE A DAY (30 TO LAST 30 DAYS)  Dispense: 30 tablet; Refill: 0  - " amphetamine-dextroamphetamine (ADDERALL) 20 MG tablet; TAKE ONE TABLET BY MOUTH ONCE DAILY  Dispense: 30 tablet; Refill: 0    2. S/P bariatric surgery  Patient has lots of issues after his bariatric surgery he does have some pain which he uses narcotics from the pain clinic but also with liquid Tylenol at times.  - acetaminophen (TYLENOL) 32 mg/mL liquid; Take 15.65 mLs (500 mg) by mouth every 4 hours as needed for fever or mild pain  Dispense: 455 mL; Refill: 1    3. Anxiety  Chronic anxiety issues with insomnia Ativan does help him sleep at night we will refill this for the month.    4. Malnutrition, unspecified type (H)  Malnutrition the patient does need to have an IV of PICC line.  He gets IV fluids and TPN daily.  This is his main nutrition.  His PICC line fell out last night.  We will try to help get this placed at [facility] as the [facility] is not taking patients at this time.    5. Chronic abdominal pain  Just saw the pain clinic and had his narcotics adjusted yesterday.      Phone call duration:  14minutes    Chuy Isaac MD

## 2020-03-24 NOTE — PROGRESS NOTES
Clinic Care Coordination Contact  [facility]/Voicemail       Clinical Data: Care Coordinator Outreach  Patient's spouse Rose (consent to communicate on file) left a voicemail for writer at 10:12 am.  Outreach attempted x 1.  No answer and no voicemail available.  [employer] message sent to Rose and patient requesting to know a good day/time to return call.  Plan:Care Coordinator will try to reach patient again withinin approximately 10 business days.    Melissa Behl BSN, RN, PHN, CCM  Primary Care Clinical RN Care Coordinator  [facility]   [phone]

## 2020-03-24 NOTE — PROGRESS NOTES
Clinic Care Coordination Contact    Follow Up Progress Note      Assessment: RN CC received call from patient's significant other Rose (consent to communicate on file) due to patient's PICC line out last night.  Rose has been in contact with patient's PCP and surgery team, see virtual visit and MyChart encounters.  Due to covid-19 precautions, patient's PICC line placement is being arranged at [facility].  Patient had not had follow up scheduled with surgery team or cardiology and these are now unable to be scheduled in person due to covid-19 precautions.  Rose states patient's G-tube continues to have bile back up 3-4 times per day.  RN CC advised Rose to send in picture attachments to his surgery provider in a virtual visit.  Rose agreeable to plan.  Patient's pain visit in June 2020 will be able to be held virtually.    Goals addressed this encounter:   Goals Addressed                 This Visit's Progress      #1 Medical (pt-stated)   Not on track     Goal Statement: I will see cardiology.  Measure of Success: Appointment will be scheduled and attended.  Supportive Steps to Achieve: Review of hospital discharge instructions.  Barriers: multiple specialists and appointments, covid-19 precautions  Strengths: care coordination, supportive spouse  Date to Achieve By: 6/31/20  Patient expressed understanding of goal: yes           #2 Medical (pt-stated)   Not on track     Goal Statement: I will see surgery as instructed.  Measure of Success: Appointment will be scheduled and attended, will try to send virtual visit request and attach pictures of G-tube bile content  Supportive Steps to Achieve: Review of hospital discharge instructions.  Barriers: multiple specialists, covid-19 precautions  Strengths: care coordination, supportive spouse  Date to Achieve By: 6/24/20  Patient expressed understanding of goal: yes                Intervention/Education provided during outreach: RN CC advised Rose to make a  virtual visit and attach pictures of G-tube contents to visit.  Reviewed plan of care.     Outreach Frequency: monthly    Plan:   1. Patient will have PICC placed as arranged for by primary care team and surgery team.  2. Rose will schedule a virtual visit with surgery team and attach pictures of G-tube contents to visit.  3. RN CC will follow up with patient/spouse in 1 month.    Melissa Behl BSN, RN, PHN, CCM  Primary Care Clinical RN Care Coordinator  [facility]   [phone]

## 2020-03-25 ENCOUNTER — MYC MEDICAL ADVICE (OUTPATIENT)
Dept: INTERNAL MEDICINE | Facility: CLINIC | Age: 50
End: 2020-03-25

## 2020-03-25 ENCOUNTER — HOME INFUSION (PRE-WILLOW HOME INFUSION) (OUTPATIENT)
Dept: PHARMACY | Facility: CLINIC | Age: 50
End: 2020-03-25

## 2020-03-25 ENCOUNTER — RECORDS - HEALTHEAST (OUTPATIENT)
Dept: ADMINISTRATIVE | Facility: OTHER | Age: 50
End: 2020-03-25

## 2020-03-26 ENCOUNTER — OFFICE VISIT (OUTPATIENT)
Dept: FAMILY MEDICINE | Facility: CLINIC | Age: 50
End: 2020-03-26
Payer: COMMERCIAL

## 2020-03-26 VITALS
SYSTOLIC BLOOD PRESSURE: 128 MMHG | HEART RATE: 98 BPM | TEMPERATURE: 98.3 F | BODY MASS INDEX: 27.75 KG/M2 | DIASTOLIC BLOOD PRESSURE: 84 MMHG | WEIGHT: 199 LBS

## 2020-03-26 DIAGNOSIS — Z01.818 PREOP GENERAL PHYSICAL EXAM: Primary | ICD-10-CM

## 2020-03-26 DIAGNOSIS — T82.898S: ICD-10-CM

## 2020-03-26 PROCEDURE — 99214 OFFICE O/P EST MOD 30 MIN: CPT | Performed by: FAMILY MEDICINE

## 2020-03-26 NOTE — PROGRESS NOTES
[address] [address]  [phone]  Dept: [phone]    PRE-OP EVALUATION:  Today's date: 3/26/2020    Parker Acevedo (: 1970) presents for pre-operative evaluation assessment as requested by Dr. Méndez.  He requires evaluation and anesthesia risk assessment prior to undergoing surgery/procedure for treatment of PICC placement .    Fax number for surgical facility: unknown  Primary Physician: Chuy Isaac  Type of Anesthesia Anticipated: yes    Patient has a Health Care Directive or Living Will:  YES on file    Preop Questions 3/26/2020   Who is doing your surgery? [facility]   What are you having done? pic line   Date of Surgery/Procedure: 2020   Facility or Hospital where procedure/surgery will be performed: [facility] [place]   1.  Do you have a history of Heart attack, stroke, stent, coronary bypass surgery, or other heart surgery? No   2.  Do you ever have any pain or discomfort in your chest? No   3.  Do you have a history of  Heart Failure? No   4.   Are you troubled by shortness of breath when:  walking on a level surface, or up a slight hill, or at night? No   5.  Do you currently have a cold, bronchitis or other respiratory infection? No   6.  Do you have a cough, shortness of breath, or wheezing? No   7.  Do you sometimes get pains in the calves of your legs when you walk? No   8. Do you or anyone in your family have previous history of blood clots? No   9.  Do you or does anyone in your family have a serious bleeding problem such as prolonged bleeding following surgeries or cuts? No   10. Have you ever had problems with anemia or been told to take iron pills? No   11. Have you had any abnormal blood loss such as black, tarry or bloody stools? No   12. Have you ever had a blood transfusion? No   13. Have you or any of your relatives ever had problems with anesthesia? No   14. Do you have sleep apnea, excessive snoring  or daytime drowsiness? No   15. Do you have any prosthetic heart valves? No   16. Do you have prosthetic joints? No         HPI:     HPI related to upcoming procedure: Replacement of PICC line that fell out      Addendum patient now needs general anesthesia for a PINZON line placement.     MEDICAL HISTORY:     Patient Active Problem List    Diagnosis Date Noted     Gram-positive bacteremia 09/29/2019     Priority: Medium     On total parenteral nutrition 09/29/2019     Priority: Medium     Added automatically from request for surgery [id number]       PICC line infiltration, sequela 07/22/2019     Priority: Medium     Infection by Candida species 01/04/2019     Priority: Medium     Bacteremia 10/10/2018     Priority: Medium     Hyperlipidemia LDL goal <130 08/07/2018     Priority: Medium     S/P bariatric surgery 07/30/2018     Priority: Medium     Generalized weakness 01/30/2018     Priority: Medium     Catheter-related bloodstream infection (CRBSI) 09/23/2017     Priority: Medium     Low serum iron 09/08/2017     Priority: Medium     Anemia, iron deficiency 09/08/2017     Priority: Medium     Abnormal echocardiogram 04/11/2017     Priority: Medium     Port or reservoir infection, initial encounter 03/16/2017     Priority: Medium     Status post cervical spinal arthrodesis 02/15/2017     Priority: Medium     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)      Priority: Medium     mild EF ~45% on rest 2/13/17, improves with stressing       Short gut syndrome      Priority: Medium     Anxiety      Priority: Medium     Gastrostomy tube in place (H) 08/09/2016     Priority: Medium     Chronic nausea 05/10/2016     Priority: Medium     Fungemia 04/11/2016     Priority: Medium     Positive blood culture 03/29/2016     Priority: Medium     Insomnia 08/13/2015     Priority: Medium     Chronic pain 07/07/2015     Priority: Medium     Patient is followed by Dr. Milligan for ongoing prescription of pain medication.  All refills  should be approved by this provider, or covering partner.    Medication(s): Fentanyl 50 mcg patches every three days/ Liquid oxycodone 5 mg/5ml up to 50 mg daily.   Maximum quantity per month: as per Dr. Milligan through [facility]  Clinic visit frequency required: Q 3 months at [facility]    Controlled substance agreement on file: Yes       Date(s): Through Pain Management    Pain Clinic evaluation in the past: Yes       Date(s):  Ongoing every three months       Location(s):  Per pain management    DIRE Total Score(s):  No flowsheet data found.    Last [facility] website verification:  Through Pain Management   [email or web address]    Esteban Daly MD             Health Care Home 02/24/2015     Priority: Medium              Iron deficiency 05/23/2014     Priority: Medium     Vitamin D deficiency 05/22/2014     Priority: Medium     Former smoker 02/24/2014     Priority: Medium     Bile reflux esophagitis 10/16/2013     Priority: Medium     Constipation 10/01/2013     Priority: Medium     Chronic anxiety 09/25/2013     Priority: Medium     Dysphagia 09/17/2013     Priority: Medium     Weight loss, non-intentional 09/17/2013     Priority: Medium     Malnutrition (H) 09/17/2013     Priority: Medium     Dehydration 08/28/2013     Priority: Medium     Vitamin B12 deficiency without anemia 07/31/2013     Priority: Medium     Diagnosis updated by automated process. Provider to review and confirm.       Thiamine deficiency 07/31/2013     Priority: Medium     ADHD (attention deficit hyperactivity disorder), inattentive type 07/02/2013     Priority: Medium     Patient is followed by RICCO SHARP for ongoing prescription of stimulants.  All refills should be approved by this provider, or covering partner.    Medication(s): Adderall.   Maximum quantity per month: 30  Clinic visit frequency required:      Controlled substance agreement on file: No  Neuropsych evaluation for ADD completed:  No    Last  [facility] website verification:  done on 5/6/19  [email or web address]         Anemia 09/20/2012     Priority: Medium     Vomiting 06/28/2012     Priority: Medium     Chronic abdominal pain 06/01/2012     Priority: Medium     Patient is followed by ALEXANDRIA WHITLEY for ongoing prescription of narcotic pain medicine.  Med: liquid oxycodone up to 60 mg per day.   Maximum use per month:   Expected duration: ongoing, hope per surgery that will resolve with upcoming surgery  Narcotic agreement on file: YES  Clinic visit recommended: Q 3 months         Peptic ulcer disease 04/08/2010     Priority: Medium     Gastric bypass status for obesity 04/08/2010     Priority: Medium     Top weight of 492.        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     Complication of anesthesia      Difficulty swallowing      Gastric ulcer, unspecified as acute or chronic, without mention of hemorrhage, perforation, or obstruction      Gastro-oesophageal reflux disease      Head injury      Hiatal hernia      Other bladder disorder      Other chronic pain      PONV (postoperative nausea and vomiting)      Severe malnutrition (H)     TPN     Short gut syndrome      Tobacco abuse      Past Surgical History:   Procedure Laterality Date     AMPUTATION       APPENDECTOMY       BACK SURGERY  11/3/2014    curve in the spine     BIOPSY LYMPH NODE CERVICAL N/A 2/20/2015    Procedure: BIOPSY LYMPH NODE CERVICAL;  Surgeon: Baron Scanlon MD;  [facility]: [facility]     C GASTRIC BYPASS,OBESE<100CM SHAYLEE-EN-Y  2002    lost 300 pounds     CHOLECYSTECTOMY       DISCECTOMY, FUSION CERVICAL ANTERIOR ONE LEVEL, COMBINED N/A 2/15/2017    Procedure: COMBINED DISCECTOMY, FUSION CERVICAL  ANTERIOR ONE LEVEL;  Surgeon: Darren Campos MD;  [facility]: [facility]     ENDOSCOPIC INSERTION TUBE GASTROSTOMY  9/9/2013    Procedure: ENDOSCOPIC INSERTION TUBE GASTROSTOMY;;  Surgeon: Francis Vyas MD;  [facility]     ENDOSCOPIC ULTRASOUND UPPER GASTROINTESTINAL TRACT (GI)  4/29/2011    Procedure:ENDOSCOPIC ULTRASOUND UPPER GASTROINTESTINAL TRACT (GI); Both Procedures done Conjointly; Surgeon:NEREIDA HOUSER; [facility]     ENDOSCOPIC ULTRASOUND UPPER GASTROINTESTINAL TRACT (GI)  9/9/2013    Procedure: ENDOSCOPIC ULTRASOUND UPPER GASTROINTESTINAL TRACT (GI);  Endoscopic Ultrasound Guide Gastrostomy Tube Placement  C-arm;  Surgeon: Noe Lizarraga MD;  [facility]     ENDOSCOPY  03/25/11    EGD, [facility]     ENDOSCOPY  08/04/09    Upper Endoscopy, [facility]     ENDOSCOPY  01/05/09    Upper Endoscopy, [facility]     ESOPHAGOSCOPY, GASTROSCOPY, DUODENOSCOPY (EGD), COMBINED  4/20/2011    Procedure:COMBINED ESOPHAGOSCOPY, GASTROSCOPY, DUODENOSCOPY (EGD); Surgeon:FRANCIS VYAS; [facility]     ESOPHAGOSCOPY, GASTROSCOPY, DUODENOSCOPY (EGD), COMBINED  6/15/2011    Procedure:COMBINED ESOPHAGOSCOPY, GASTROSCOPY, DUODENOSCOPY (EGD); Surgeon:FRANCIS VYAS; [facility]     ESOPHAGOSCOPY, GASTROSCOPY, DUODENOSCOPY (EGD), COMBINED  6/12/2013    Procedure: COMBINED ESOPHAGOSCOPY, GASTROSCOPY, DUODENOSCOPY (EGD);;  Surgeon: Francis Vyas MD;  [facility]     ESOPHAGOSCOPY, GASTROSCOPY, DUODENOSCOPY (EGD), COMBINED  11/22/2013    Procedure: COMBINED ESOPHAGOSCOPY, GASTROSCOPY, DUODENOSCOPY (EGD);;  Surgeon: Francis Vyas MD;  [facility]     ESOPHAGOSCOPY, GASTROSCOPY, DUODENOSCOPY (EGD), COMBINED  4/30/2014    Procedure: COMBINED ESOPHAGOSCOPY, GASTROSCOPY, DUODENOSCOPY (EGD);  Surgeon: Francis Vyas MD;  [facility]     ESOPHAGOSCOPY, GASTROSCOPY, DUODENOSCOPY (EGD), COMBINED N/A 2/20/2015    Procedure: COMBINED  ESOPHAGOSCOPY, GASTROSCOPY, DUODENOSCOPY (EGD), BIOPSY SINGLE OR MULTIPLE;  Surgeon: Baron Scanlon MD;  [facility]     ESOPHAGOSCOPY, GASTROSCOPY, DUODENOSCOPY (EGD), COMBINED N/A 9/30/2015    Procedure: COMBINED ESOPHAGOSCOPY, GASTROSCOPY, DUODENOSCOPY (EGD);  Surgeon: Francis Vyas MD;  Location:  GI     ESOPHAGOSCOPY, GASTROSCOPY, DUODENOSCOPY (EGD), COMBINED N/A 10/3/2019    Procedure: Upper Endoscopy;  Surgeon: Clif Morrow MD;  [facility]     GASTRECTOMY  6/22/2012    Procedure: GASTRECTOMY;  Open Approach, Excise Ulcers,Partial Gastrectomy, Esophagojejunostomy, Hiatal Hernia Repair, Extensive Lysis of Adhesions and Esaphagogastrodudenoscopy.;  Surgeon: Francis Vyas MD;  [facility]     GASTROJEJUNOSTOMY  08/26/09    Extensice enterolysis, partial resect. jejunum, part. resect gastric pouch, gastrojejunostomy anastomosis     HC ESOPH/GAS REFLUX TEST W NASAL IMPED ELECTRODE  8/5/2013    Procedure: ESOPHAGEAL IMPEDENCE FUNCTION TEST 1 HOUR OR LESS;  Surgeon: Halie Lang MD;  Location:  GI     HEAD & NECK SURGERY  2/15/2017    C5-C6     HERNIA REPAIR  2006    Umbilical hernia     HERNIORRHAPHY HIATAL  6/22/2012    Procedure: HERNIORRHAPHY HIATAL;;  Surgeon: Francis Vyas MD;  [facility]     HERNIORRHAPHY INGUINAL  11/22/2013    Procedure: HERNIORRHAPHY INGUINAL;;  Surgeon: Francis Vyas MD;  [facility]     INSERT PICC LINE Right 12/19/2019    Procedure: Picc Placement;  Surgeon: Per Dumont PA-C;  [facility]     INSERT PICC LINE Right 2/21/2020    Procedure: INSERTION, PICC;  Surgeon: Per Dumont PA-C;  [facility]     INSERT PORT VASCULAR ACCESS Right 12/19/2017    Procedure: INSERT PORT VASCULAR ACCESS;  Right Chest Port Placement ;  Surgeon: Lisandro Alejandro PA-C;  [facility]     INSERT PORT VASCULAR ACCESS Right 8/2/2018    Procedure: INSERT PORT VASCULAR ACCESS;  Place single lumen tunneled  central venous access catheter;  Surgeon: Guy Jamil PA-C;  [facility]     IR CVC TUNNEL PLACEMENT > 5 YRS OF AGE  8/7/2019     IR CVC TUNNEL REMOVAL RIGHT  10/1/2019     IR FOLLOW UP VISIT OUTPATIENT  8/7/2019     IR PICC PLACEMENT > 5 YRS OF AGE  3/7/2019     IR PICC PLACEMENT > 5 YRS OF AGE  12/19/2019     IR PICC PLACEMENT > 5 YRS OF AGE  2/21/2020     LAPAROTOMY EXPLORATORY  11/22/2013    Procedure: LAPAROTOMY EXPLORATORY;  Exploratory Laparotomy, Upper Endoscopy, Left Inguinal Hernia Repair;  Surgeon: Francis Vyas MD;  [facility]     ORTHOPEDIC SURGERY       PICC INSERTION Right 03/16/2017    5fr DL BioFlo PICC, 42cm (3cm external) in the R medial brachial vein w/ tip in the SVC RA junction.     PICC INSERTION Left 09/23/2017    5fr DL BioFlo PICC, 45cm (1cm external) in the L basilic vein w/ tip in the SVC RA junction.     PICC INSERTION Right 05/16/2019    5Fr - 43cm, Medial brachial vein, low SVC     PICC INSERTION Right 10/02/2019    5Fr - 43cm (2cm external), basilic vein, low SVC     SHAYLEE EN Y BOWEL  2003     SOFT TISSUE SURGERY       THORACIC SURGERY       TONSILLECTOMY       TRANSESOPHAGEAL ECHOCARDIOGRAM INTRAOPERATIVE N/A 1/8/2019    Procedure: TRANSESOPHAGEAL ECHOCARDIOGRAM INTRAOPERATIVE;  Surgeon: GENERIC ANESTHESIA PROVIDER;  [facility]     Current Outpatient Medications   Medication Sig Dispense Refill     acetaminophen (TYLENOL) 32 mg/mL liquid Take 15.65 mLs (500 mg) by mouth every 4 hours as needed for fever or mild pain 455 mL 1     albuterol (PROAIR HFA/PROVENTIL HFA/VENTOLIN HFA) 108 (90 Base) MCG/ACT inhaler Inhale 2 puffs into the lungs every 4 hours as needed for shortness of breath / dyspnea or wheezing 1 Inhaler 1     amphetamine-dextroamphetamine (ADDERALL) 20 MG tablet TAKE ONE TABLET BY MOUTH ONCE DAILY 30 tablet 0     carvedilol (COREG) 6.25 MG tablet Take 6.25 mg by mouth 2 times daily (with meals)       cyanocobalamin (CYANOCOBALAMIN) 1000  "MCG/ML injection Inject 1 mL (1,000 mcg) into the muscle every 30 days 1 mL 11     [place] [facility]NAGED PATIENT Contact [facility] for patient specific medication information at [phone] on admission and discharge from the hospital.  Phones are answered 24 hours a day 7 days a week 365 days a year.    Providers - Choose \"CONTINUE HOME MED (no script)\" at discharge if patient treatment with home infusion will continue.       fentaNYL (DURAGESIC) 25 mcg/hr 72 hr patch Place 1 patch onto the skin every 48 hours remove old patch.   May fill 4/3/20 and start 4/5/20 15 patch 0     lidocaine (LIDODERM) 5 % Patch Place 1-2 patches onto the skin every 24 hours Wear for 12 hours, remove for 12 hours.  OK to cut to better fit to size. 60 patch 6     lidocaine, alum & mag hydroxide-simethicone GI Cocktail 30 ml daily as needed for mouth/stomach pain. 1 Bottle 1     LORazepam (ATIVAN) 1 MG tablet TAKE 1 TABLET (1MG) BY MOUTH AS NEEDED FOR ANXIETY WITH TPN AND MEDICATIONS . JUST ONCE A DAY (30 TO LAST 30 DAYS) 30 tablet 0     naloxone (NARCAN) 4 MG/0.1ML nasal spray Spray 1 spray (4 mg) into one nostril alternating nostrils as needed for opioid reversal every 2-3 minutes until assistance arrives (Patient not taking: Reported on 3/24/2020) 0.2 mL 0     Needle, Disp, (BD DISP NEEDLES) 27G X 1/2\" MISC 1 Device every 30 days Use for cyanocobalamin injection once q 30 days. 3 each 4     ondansetron (ZOFRAN-ODT) 8 MG ODT tab DISSOLVE ONE TABLET ON TONGUE EVERY 8 HOURS AS NEEDED FOR NAUSEA 90 tablet 1     order for DME Equipment being ordered: Urine Drainage bag, leg. 15 Units 11     order for DME Equipment being ordered: Bilateral knee high chronic venous insufficiency stockings--  mild-moderate pressures. 4 each 5     oxyCODONE (ROXICODONE) 5 MG/5ML solution Take 5-10 mLs (5-10 mg) by mouth 2 times daily as needed for pain Max of 20mg/day. 30 day supply. May fill on 4/10/20 to start on 4/12/20 600 mL 0     " pantoprazole (PROTONIX) 40 MG EC tablet Take 1 tablet (40 mg) by mouth daily 30 tablet 8     polyethylene glycol (MIRALAX) powder Take 17 g (1 capful) by mouth daily 578 g 11     sodium chloride 0.9% infusion Inject 1,000-2,000 mLs into the vein as needed        sucralfate (CARAFATE) 1 GM tablet Take 1 tablet (1 g) by mouth 4 times daily 120 tablet 3     UNABLE TO FIND States takes TPN 2050 ml  Every 14 hours through PICC       vitamin D2 (ERGOCALCIFEROL) 73377 units (1250 mcg) capsule Take 1 capsule (50,000 Units) by mouth once a week 12 capsule 3     OTC products: None, except as noted above    Allergies   Allergen Reactions     Bactrim [Sulfamethoxazole W/Trimethoprim] Rash     Penicillins Anaphylaxis     Please see Antimicrobial Management Team allergy assessment note 10/10/2018. Patient reported tolerating amoxicillin.     Doxycycline Rash     Vancomycin Rash     Rash after receiving vancomycin 3/28/16 (red man's?). Tolerated with slower infusion and diphenhydramine premed.      Latex Allergy: NO    Social History     Tobacco Use     Smoking status: Current Some Day Smoker     Packs/day: 0.25     Years: 3.00     Pack years: 0.75     Types: Cigarettes     Last attempt to quit: 2018     Years since quittin.6     Smokeless tobacco: Never Used     Tobacco comment: I use an e cig every now and than   Substance Use Topics     Alcohol use: No     Comment: quit      History   Drug Use No       REVIEW OF SYSTEMS:   Constitutional, neuro, ENT, endocrine, pulmonary, cardiac, gastrointestinal, genitourinary, musculoskeletal, integument and psychiatric systems are negative, except as otherwise noted.    EXAM:   /84   Pulse 98   Temp 98.3  F (36.8  C) (Temporal)   Wt 90.3 kg (199 lb)   BMI 27.75 kg/m      GENERAL APPEARANCE: healthy, alert and no distress     EYES: EOMI,  PERRL     HENT: ear canals and TM's normal and nose and mouth without ulcers or lesions     NECK: no adenopathy, no asymmetry,  masses, or scars and thyroid normal to palpation     RESP: lungs clear to auscultation - no rales, rhonchi or wheezes     CV: regular rates and rhythm, normal S1 S2, no S3 or S4 and no murmur, click or rub     ABDOMEN: bowel sounds normal and well-healed incisions      MS: extremities normal- no gross deformities noted, no evidence of inflammation in joints, FROM in all extremities.     SKIN: no suspicious lesions or rashes     NEURO: Normal strength and tone, sensory exam grossly normal, mentation intact and speech normal     PSYCH: mentation appears normal. and affect normal/bright     LYMPHATICS: No cervical adenopathy    DIAGNOSTICS:   EKG: Not indicated due to non-vascular surgery and low risk of event (age <65 and without cardiac risk factors)    Recent Labs   Lab Test 03/17/20  1150 03/10/20  1245  10/01/19  0819  07/22/19  1820  07/23/13  1036  04/09/13  0921   HGB 11.3* 10.8*   < > 12.3*   < > 13.5   < > 10.5*   < > 10.6*   * 170   < > 143*   < > 193   < >  --    < >  --    INR  --   --   --  1.12  --  1.10   < >  --   --   --     142   < > 141   < > 142   < > 143   < >  --    POTASSIUM 3.8 3.5   < > 3.6   < > 3.6   < > 4.0   < >  --    CR 0.68 0.65*   < > 0.66   < > 0.60*   < > 0.72   < >  --    A1C  --   --   --   --   --   --   --  4.9  --  5.3    < > = values in this interval not displayed.        IMPRESSION:   Reason for surgery/procedure: Needs replaced PICC line for TPN    The proposed surgical procedure is considered LOW risk.    REVISED CARDIAC RISK INDEX  The patient has the following serious cardiovascular risks for perioperative complications such as (MI, PE, VFib and 3  AV Block):  No serious cardiac risks  INTERPRETATION: 0 risks: Class I (very low risk - 0.4% complication rate)    The patient has the following additional risks for perioperative complications:  No identified additional risks    No diagnosis found.    RECOMMENDATIONS:             APPROVAL GIVEN to proceed with  proposed procedure, without further diagnostic evaluation         ADDENDUM on April 8, 2020.  Ok for general anesthesia to get a PINZON line.  He should have electrolytes drawn prior to procedure due to IV fluids and TPN being interrupted lately.    He can take his coreg the day of procedure and continue his fentanyl patch. He should take no other medications    Electronically signed by Chuy Isaac MD          Signed Electronically by: Kanu Mir MD    Copy of this evaluation report is provided to requesting physician.    [place] Preop Guidelines    Revised Cardiac Risk Index

## 2020-03-27 ENCOUNTER — RECORDS - HEALTHEAST (OUTPATIENT)
Dept: INTERVENTIONAL RADIOLOGY/VASCULAR | Facility: HOSPITAL | Age: 50
End: 2020-03-27

## 2020-03-27 ENCOUNTER — TRANSFERRED RECORDS (OUTPATIENT)
Dept: HEALTH INFORMATION MANAGEMENT | Facility: CLINIC | Age: 50
End: 2020-03-27

## 2020-03-27 DIAGNOSIS — E46 MALNUTRITION (H): ICD-10-CM

## 2020-03-27 DIAGNOSIS — E46 UNSPECIFIED PROTEIN-CALORIE MALNUTRITION (H): ICD-10-CM

## 2020-03-27 ASSESSMENT — MIFFLIN-ST. JEOR: SCORE: 1704.07

## 2020-03-29 ENCOUNTER — MYC MEDICAL ADVICE (OUTPATIENT)
Dept: INTERNAL MEDICINE | Facility: CLINIC | Age: 50
End: 2020-03-29

## 2020-03-30 ENCOUNTER — HOME INFUSION (PRE-WILLOW HOME INFUSION) (OUTPATIENT)
Dept: PHARMACY | Facility: CLINIC | Age: 50
End: 2020-03-30

## 2020-03-31 ENCOUNTER — HOSPITAL ENCOUNTER (OUTPATIENT)
Dept: LAB | Facility: CLINIC | Age: 50
Discharge: HOME | End: 2020-03-31
Attending: SURGERY | Admitting: SURGERY
Payer: COMMERCIAL

## 2020-03-31 LAB
ALBUMIN SERPL-MCNC: 3.3 G/DL (ref 3.4–5)
ALP SERPL-CCNC: 81 U/L (ref 40–150)
ALT SERPL W P-5'-P-CCNC: 18 U/L (ref 0–70)
ANION GAP SERPL CALCULATED.3IONS-SCNC: 5 MMOL/L (ref 3–14)
AST SERPL W P-5'-P-CCNC: 15 U/L (ref 0–45)
BASOPHILS # BLD AUTO: 0.1 10E9/L (ref 0–0.2)
BASOPHILS NFR BLD AUTO: 0.6 %
BILIRUB DIRECT SERPL-MCNC: 0.1 MG/DL (ref 0–0.2)
BILIRUB SERPL-MCNC: 0.5 MG/DL (ref 0.2–1.3)
BUN SERPL-MCNC: 14 MG/DL (ref 7–30)
CALCIUM SERPL-MCNC: 8.2 MG/DL (ref 8.5–10.1)
CHLORIDE SERPL-SCNC: 109 MMOL/L (ref 94–109)
CO2 SERPL-SCNC: 27 MMOL/L (ref 20–32)
CREAT SERPL-MCNC: 0.59 MG/DL (ref 0.66–1.25)
DIFFERENTIAL METHOD BLD: ABNORMAL
EOSINOPHIL NFR BLD AUTO: 1 %
ERYTHROCYTE [DISTWIDTH] IN BLOOD BY AUTOMATED COUNT: 15.5 % (ref 10–15)
GFR SERPL CREATININE-BSD FRML MDRD: >90 ML/MIN/{1.73_M2}
GLUCOSE SERPL-MCNC: 88 MG/DL (ref 70–99)
HCT VFR BLD AUTO: 39.8 % (ref 40–53)
HGB BLD-MCNC: 13 G/DL (ref 13.3–17.7)
IMM GRANULOCYTES # BLD: 0.1 10E9/L (ref 0–0.4)
IMM GRANULOCYTES NFR BLD: 0.6 %
LYMPHOCYTES # BLD AUTO: 2 10E9/L (ref 0.8–5.3)
LYMPHOCYTES NFR BLD AUTO: 23 %
MAGNESIUM SERPL-MCNC: 2.2 MG/DL (ref 1.6–2.3)
MCH RBC QN AUTO: 30 PG (ref 26.5–33)
MCHC RBC AUTO-ENTMCNC: 32.7 G/DL (ref 31.5–36.5)
MCV RBC AUTO: 92 FL (ref 78–100)
MONOCYTES # BLD AUTO: 1 10E9/L (ref 0–1.3)
MONOCYTES NFR BLD AUTO: 11.4 %
NEUTROPHILS # BLD AUTO: 5.6 10E9/L (ref 1.6–8.3)
NEUTROPHILS NFR BLD AUTO: 63.4 %
NRBC # BLD AUTO: 0 10*3/UL
NRBC BLD AUTO-RTO: 0 /100
PHOSPHATE SERPL-MCNC: 3.7 MG/DL (ref 2.5–4.5)
PLATELET # BLD AUTO: 172 10E9/L (ref 150–450)
POTASSIUM SERPL-SCNC: 4 MMOL/L (ref 3.4–5.3)
PROT SERPL-MCNC: 6.8 G/DL (ref 6.8–8.8)
RBC # BLD AUTO: 4.34 10E12/L (ref 4.4–5.9)
SODIUM SERPL-SCNC: 141 MMOL/L (ref 133–144)
TRIGL SERPL-MCNC: 114 MG/DL
WBC # BLD AUTO: 8.8 10E9/L (ref 4–11)

## 2020-03-31 PROCEDURE — 85025 COMPLETE CBC W/AUTO DIFF WBC: CPT | Performed by: SURGERY

## 2020-03-31 PROCEDURE — 82248 BILIRUBIN DIRECT: CPT | Performed by: SURGERY

## 2020-03-31 PROCEDURE — 80053 COMPREHEN METABOLIC PANEL: CPT | Performed by: SURGERY

## 2020-03-31 PROCEDURE — 83735 ASSAY OF MAGNESIUM: CPT | Performed by: SURGERY

## 2020-03-31 PROCEDURE — 84100 ASSAY OF PHOSPHORUS: CPT | Performed by: SURGERY

## 2020-03-31 PROCEDURE — 84478 ASSAY OF TRIGLYCERIDES: CPT | Performed by: SURGERY

## 2020-03-31 NOTE — PROGRESS NOTES
This is a recent snapshot of the patient's [facility] medical record.  For current drug dose and complete information and questions, call [phone] or In Basket pool, [facility] home infusion ([id number])  CSN Number:  [id number]

## 2020-04-02 ENCOUNTER — HOME INFUSION (PRE-WILLOW HOME INFUSION) (OUTPATIENT)
Dept: PHARMACY | Facility: CLINIC | Age: 50
End: 2020-04-02

## 2020-04-03 ENCOUNTER — TELEPHONE (OUTPATIENT)
Dept: INTERNAL MEDICINE | Facility: CLINIC | Age: 50
End: 2020-04-03

## 2020-04-03 ENCOUNTER — MYC MEDICAL ADVICE (OUTPATIENT)
Dept: INTERNAL MEDICINE | Facility: CLINIC | Age: 50
End: 2020-04-03

## 2020-04-03 DIAGNOSIS — E46 MALNUTRITION, UNSPECIFIED TYPE (H): ICD-10-CM

## 2020-04-03 DIAGNOSIS — T80.219A INFECTION OF PERIPHERALLY INSERTED CENTRAL VENOUS CATHETER (PICC), INITIAL ENCOUNTER: Primary | ICD-10-CM

## 2020-04-03 DIAGNOSIS — Z98.84 GASTRIC BYPASS STATUS FOR OBESITY: ICD-10-CM

## 2020-04-03 PROCEDURE — 87040 BLOOD CULTURE FOR BACTERIA: CPT | Performed by: SURGERY

## 2020-04-03 NOTE — TELEPHONE ENCOUNTER
"Reason for Call:  Other call back    Detailed comments: Home care nurse through [facility] calls to report patient has a PIC line, due to TPN.  Pic line has \"quite a bit\" of bloody drainage.  Has saturated the bio patch, and gauze. Is having pain in the right arm, and arm is warm to touch. Temp today is 99.5, and states was 101 a few days ago. Seeking advice on how to manage this.   Phone Number Patient can be reached at: Other phone number:  [facility] care nurse, [phone]    Best Time: as soon as possible    Can we leave a detailed message on this number? YES    Call taken on 4/3/2020 at 12:20 PM by Leah Lorenzo      "

## 2020-04-03 NOTE — TELEPHONE ENCOUNTER
I called back after the home care nurse was at the patient's home.  The PICC line is really salvageable.  I think to treat his fevers and possible infection the best thing is to pull the PICC line.  She will do a blood culture off the PICC line and do a peripheral blood culture.  We will then place a peripheral IV for hydration over the weekend.  Hopefully next week he can get a new PICC line for continued TPN.

## 2020-04-06 NOTE — TELEPHONE ENCOUNTER
Writer called patients wife to schedule appointment with Dr. Oseguera. Patient scheduled for tomorrow at 1100 was informed to come NPO, so if needed a port or PICC could be placed. Wife verbalized understanding. Please call wife at [phone] to give her an update on the visit.   Karine Villagran RN on 4/6/2020 at 3:53 PM

## 2020-04-06 NOTE — TELEPHONE ENCOUNTER
Please call Rose uriarte [phone] to set up an appt. Can this wait or be scheduled? Would like with dr Daryl Oseguera ref. By Dr Poncho goldman. Infection of peripherally inserted central venous catheter (PICC), initial encou...  Malnutrition, unspecified type (H) [E46]  Gastric bypass status for obesity [Z98.84]     Thank You Teetee

## 2020-04-07 ENCOUNTER — TELEPHONE (OUTPATIENT)
Dept: INTERVENTIONAL RADIOLOGY/VASCULAR | Facility: CLINIC | Age: 50
End: 2020-04-07

## 2020-04-07 ENCOUNTER — HOSPITAL ENCOUNTER (OUTPATIENT)
Facility: CLINIC | Age: 50
End: 2020-04-07
Admitting: RADIOLOGY
Payer: COMMERCIAL

## 2020-04-07 ENCOUNTER — OFFICE VISIT (OUTPATIENT)
Dept: SURGERY | Facility: CLINIC | Age: 50
End: 2020-04-07
Payer: COMMERCIAL

## 2020-04-07 VITALS
DIASTOLIC BLOOD PRESSURE: 85 MMHG | SYSTOLIC BLOOD PRESSURE: 136 MMHG | HEART RATE: 89 BPM | WEIGHT: 214.6 LBS | BODY MASS INDEX: 29.93 KG/M2

## 2020-04-07 DIAGNOSIS — E44.0 MODERATE PROTEIN-CALORIE MALNUTRITION (H): Primary | ICD-10-CM

## 2020-04-07 DIAGNOSIS — I87.8 VENOFIBROSIS: ICD-10-CM

## 2020-04-07 PROCEDURE — 99213 OFFICE O/P EST LOW 20 MIN: CPT | Performed by: SPECIALIST

## 2020-04-07 RX ORDER — FLUMAZENIL 0.1 MG/ML
0.2 INJECTION, SOLUTION INTRAVENOUS
Status: CANCELLED | OUTPATIENT
Start: 2020-04-07

## 2020-04-07 RX ORDER — DEXTROSE MONOHYDRATE 25 G/50ML
25-50 INJECTION, SOLUTION INTRAVENOUS
Status: CANCELLED | OUTPATIENT
Start: 2020-04-07

## 2020-04-07 RX ORDER — NALOXONE HYDROCHLORIDE 0.4 MG/ML
.1-.4 INJECTION, SOLUTION INTRAMUSCULAR; INTRAVENOUS; SUBCUTANEOUS
Status: CANCELLED | OUTPATIENT
Start: 2020-04-07

## 2020-04-07 RX ORDER — LIDOCAINE 40 MG/G
CREAM TOPICAL
Status: CANCELLED | OUTPATIENT
Start: 2020-04-07

## 2020-04-07 RX ORDER — FENTANYL CITRATE 50 UG/ML
25-50 INJECTION, SOLUTION INTRAMUSCULAR; INTRAVENOUS EVERY 5 MIN PRN
Status: CANCELLED | OUTPATIENT
Start: 2020-04-07

## 2020-04-07 RX ORDER — HEPARIN SODIUM,PORCINE 10 UNIT/ML
5 VIAL (ML) INTRAVENOUS EVERY 24 HOURS
Status: CANCELLED | OUTPATIENT
Start: 2020-04-07

## 2020-04-07 RX ORDER — NICOTINE POLACRILEX 4 MG
15-30 LOZENGE BUCCAL
Status: CANCELLED | OUTPATIENT
Start: 2020-04-07

## 2020-04-07 RX ORDER — ACETAMINOPHEN 325 MG/1
325 TABLET ORAL
Status: CANCELLED | OUTPATIENT
Start: 2020-04-07

## 2020-04-07 RX ORDER — HEPARIN SODIUM,PORCINE 10 UNIT/ML
5-10 VIAL (ML) INTRAVENOUS
Status: CANCELLED | OUTPATIENT
Start: 2020-04-07

## 2020-04-07 RX ORDER — CLINDAMYCIN PHOSPHATE 900 MG/50ML
900 INJECTION, SOLUTION INTRAVENOUS
Status: CANCELLED | OUTPATIENT
Start: 2020-04-07

## 2020-04-07 NOTE — PROGRESS NOTES
Consult requested by Dr. Isaac    Reason for consultation - cm placement    HPI:   Patient is a 49-year-old white male status post a Celestino-en-Y gastric bypass with a postoperative course complicated by multiple marginal ulcers requiring multiple revisions and resultant short gut syndrome.  The patient also reports a draining enterocutaneous fistula of approximately a liter a day.  He only gets 300 to 600 carol ann in per day that he can tolerate.  He requires TPN nutritional support as well as fluids.  He recently had a upper extremity PICC line placed and it was removed Friday due to infection around the site.  He was also started antibiotics at that time.  He also tells me he has had multiple ports/Cm is placed in both jugulars as well as subclavian's.  He also thinks he might have some form of a central stenosis.  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  He is now referred to me for Cm placement.    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     Complication of anesthesia      Difficulty swallowing      Gastric ulcer, unspecified as acute or chronic, without mention of hemorrhage, perforation, or obstruction      Gastro-oesophageal reflux disease      Head injury      Hiatal hernia      Other bladder disorder      Other chronic pain      PONV (postoperative nausea and vomiting)      Severe malnutrition (H)     TPN     Short gut syndrome      Tobacco abuse      Past Surgical History:   Procedure Laterality Date     AMPUTATION       APPENDECTOMY       BACK SURGERY  11/3/2014    curve in the spine     BIOPSY LYMPH NODE CERVICAL N/A 2/20/2015    Procedure: BIOPSY  LYMPH NODE CERVICAL;  Surgeon: Baron Scanlon MD;  [facility]: [facility]     C GASTRIC BYPASS,OBESE<100CM SHAYLEE-EN-Y  2002    lost 300 pounds     CHOLECYSTECTOMY       DISCECTOMY, FUSION CERVICAL ANTERIOR ONE LEVEL, COMBINED N/A 2/15/2017    Procedure: COMBINED DISCECTOMY, FUSION CERVICAL ANTERIOR ONE LEVEL;  Surgeon: Darren Campos MD;  [facility]     ENDOSCOPIC INSERTION TUBE GASTROSTOMY  9/9/2013    Procedure: ENDOSCOPIC INSERTION TUBE GASTROSTOMY;;  Surgeon: Francis Vyas MD;  [facility]     ENDOSCOPIC ULTRASOUND UPPER GASTROINTESTINAL TRACT (GI)  4/29/2011    Procedure:ENDOSCOPIC ULTRASOUND UPPER GASTROINTESTINAL TRACT (GI); Both Procedures done Conjointly; Surgeon:NEREIDA HOUSER; [facility]     ENDOSCOPIC ULTRASOUND UPPER GASTROINTESTINAL TRACT (GI)  9/9/2013    Procedure: ENDOSCOPIC ULTRASOUND UPPER GASTROINTESTINAL TRACT (GI);  Endoscopic Ultrasound Guide Gastrostomy Tube Placement  C-arm;  Surgeon: Noe Lizarraga MD;  [facility]     ENDOSCOPY  03/25/11    EGD, [facility]     ENDOSCOPY  08/04/09    Upper Endoscopy, [facility]     ENDOSCOPY  01/05/09    Upper Endoscopy, [facility]     ESOPHAGOSCOPY, GASTROSCOPY, DUODENOSCOPY (EGD), COMBINED  4/20/2011    Procedure:COMBINED ESOPHAGOSCOPY, GASTROSCOPY, DUODENOSCOPY (EGD); Surgeon:FRANCIS VYAS; [facility]     ESOPHAGOSCOPY, GASTROSCOPY, DUODENOSCOPY (EGD), COMBINED  6/15/2011    Procedure:COMBINED ESOPHAGOSCOPY, GASTROSCOPY, DUODENOSCOPY (EGD); Surgeon:FRANCIS VYAS; [facility]     ESOPHAGOSCOPY, GASTROSCOPY, DUODENOSCOPY (EGD), COMBINED  6/12/2013    Procedure: COMBINED ESOPHAGOSCOPY, GASTROSCOPY, DUODENOSCOPY (EGD);;  Surgeon: Francis Vyas MD;  [facility]     ESOPHAGOSCOPY, GASTROSCOPY, DUODENOSCOPY (EGD), COMBINED  11/22/2013    Procedure: COMBINED ESOPHAGOSCOPY, GASTROSCOPY, DUODENOSCOPY (EGD);;  Surgeon: Francis Vyas MD;  [facility]      ESOPHAGOSCOPY, GASTROSCOPY, DUODENOSCOPY (EGD), COMBINED  4/30/2014    Procedure: COMBINED ESOPHAGOSCOPY, GASTROSCOPY, DUODENOSCOPY (EGD);  Surgeon: Francis Vyas MD;  Location:  GI     ESOPHAGOSCOPY, GASTROSCOPY, DUODENOSCOPY (EGD), COMBINED N/A 2/20/2015    Procedure: COMBINED ESOPHAGOSCOPY, GASTROSCOPY, DUODENOSCOPY (EGD), BIOPSY SINGLE OR MULTIPLE;  Surgeon: Baron Scanlon MD;  [facility]     ESOPHAGOSCOPY, GASTROSCOPY, DUODENOSCOPY (EGD), COMBINED N/A 9/30/2015    Procedure: COMBINED ESOPHAGOSCOPY, GASTROSCOPY, DUODENOSCOPY (EGD);  Surgeon: Francis Vyas MD;  Location:  GI     ESOPHAGOSCOPY, GASTROSCOPY, DUODENOSCOPY (EGD), COMBINED N/A 10/3/2019    Procedure: Upper Endoscopy;  Surgeon: Clif Morrow MD;  [facility]     GASTRECTOMY  6/22/2012    Procedure: GASTRECTOMY;  Open Approach, Excise Ulcers,Partial Gastrectomy, Esophagojejunostomy, Hiatal Hernia Repair, Extensive Lysis of Adhesions and Esaphagogastrodudenoscopy.;  Surgeon: Francis Vyas MD;  [facility]     GASTROJEJUNOSTOMY  08/26/09    Extensice enterolysis, partial resect. jejunum, part. resect gastric pouch, gastrojejunostomy anastomosis     HC ESOPH/GAS REFLUX TEST W NASAL IMPED ELECTRODE  8/5/2013    Procedure: ESOPHAGEAL IMPEDENCE FUNCTION TEST 1 HOUR OR LESS;  Surgeon: Halie Lang MD;  Location:  GI     HEAD & NECK SURGERY  2/15/2017    C5-C6     HERNIA REPAIR  2006    Umbilical hernia     HERNIORRHAPHY HIATAL  6/22/2012    Procedure: HERNIORRHAPHY HIATAL;;  Surgeon: Francis Vyas MD;  [facility]     HERNIORRHAPHY INGUINAL  11/22/2013    Procedure: HERNIORRHAPHY INGUINAL;;  Surgeon: Francis Vyas MD;  [facility]     INSERT PICC LINE Right 12/19/2019    Procedure: Picc Placement;  Surgeon: Per Dumont PA-C;  [facility]     INSERT PICC LINE Right 2/21/2020    Procedure: INSERTION, PICC;  Surgeon: Per Dumont PA-C;  [facility]      INSERT PORT VASCULAR ACCESS Right 12/19/2017    Procedure: INSERT PORT VASCULAR ACCESS;  Right Chest Port Placement ;  Surgeon: Lisandro Alejandro PA-C;  [facility]     INSERT PORT VASCULAR ACCESS Right 8/2/2018    Procedure: INSERT PORT VASCULAR ACCESS;  Place single lumen tunneled central venous access catheter;  Surgeon: Guy Jamil PA-C;  [facility]     IR CVC TUNNEL PLACEMENT > 5 YRS OF AGE  8/7/2019     IR CVC TUNNEL REMOVAL RIGHT  10/1/2019     IR FOLLOW UP VISIT OUTPATIENT  8/7/2019     IR PICC PLACEMENT > 5 YRS OF AGE  3/7/2019     IR PICC PLACEMENT > 5 YRS OF AGE  12/19/2019     IR PICC PLACEMENT > 5 YRS OF AGE  2/21/2020     LAPAROTOMY EXPLORATORY  11/22/2013    Procedure: LAPAROTOMY EXPLORATORY;  Exploratory Laparotomy, Upper Endoscopy, Left Inguinal Hernia Repair;  Surgeon: Francis Vyas MD;  [facility]     ORTHOPEDIC SURGERY       PICC INSERTION Right 03/16/2017    5fr DL BioFlo PICC, 42cm (3cm external) in the R medial brachial vein w/ tip in the SVC RA junction.     PICC INSERTION Left 09/23/2017    5fr DL BioFlo PICC, 45cm (1cm external) in the L basilic vein w/ tip in the SVC RA junction.     PICC INSERTION Right 05/16/2019    5Fr - 43cm, Medial brachial vein, low SVC     PICC INSERTION Right 10/02/2019    5Fr - 43cm (2cm external), basilic vein, low SVC     SHAYLEE EN Y BOWEL  2003     SOFT TISSUE SURGERY       THORACIC SURGERY       TONSILLECTOMY       TRANSESOPHAGEAL ECHOCARDIOGRAM INTRAOPERATIVE N/A 1/8/2019    Procedure: TRANSESOPHAGEAL ECHOCARDIOGRAM INTRAOPERATIVE;  Surgeon: GENERIC ANESTHESIA PROVIDER;  [facility]     Current Outpatient Medications   Medication     acetaminophen (TYLENOL) 32 mg/mL liquid     albuterol (PROAIR HFA/PROVENTIL HFA/VENTOLIN HFA) 108 (90 Base) MCG/ACT inhaler     amphetamine-dextroamphetamine (ADDERALL) 20 MG tablet     carvedilol (COREG) 6.25 MG tablet     cyanocobalamin (CYANOCOBALAMIN) 1000 MCG/ML injection      "[facility]NA[facility] PATIENT     fentaNYL (DURAGESIC) 25 mcg/hr 72 hr patch     lidocaine (LIDODERM) 5 % Patch     lidocaine, alum & mag hydroxide-simethicone GI Cocktail     LORazepam (ATIVAN) 1 MG tablet     naloxone (NARCAN) 4 MG/0.1ML nasal spray     Needle, Disp, (BD DISP NEEDLES) 27G X 1/2\" MISC     ondansetron (ZOFRAN-ODT) 8 MG ODT tab     order for DME     order for DME     oxyCODONE (ROXICODONE) 5 MG/5ML solution     pantoprazole (PROTONIX) 40 MG EC tablet     polyethylene glycol (MIRALAX) powder     sodium chloride 0.9% infusion     sucralfate (CARAFATE) 1 GM tablet     UNABLE TO FIND     vitamin D2 (ERGOCALCIFEROL) 49365 units (1250 mcg) capsule     No current facility-administered medications for this visit.         Allergies   Allergen Reactions     Bactrim [Sulfamethoxazole W/Trimethoprim] Rash     Penicillins Anaphylaxis     Please see Antimicrobial Management Team allergy assessment note 10/10/2018. Patient reported tolerating amoxicillin.     Doxycycline Rash     Vancomycin Rash     Rash after receiving vancomycin 3/28/16 (red man's?). Tolerated with slower infusion and diphenhydramine premed.     Social History     Socioeconomic History     Marital status:      Spouse name: Rose     Number of children: 1     Years of education: Not on file     Highest education level: Not on file   Occupational History     Not on file   Social Needs     Financial resource strain: Not hard at all     Food insecurity     Worry: Never true     Inability: Never true     Transportation needs     Medical: No     Non-medical: No   Tobacco Use     Smoking status: Current Some Day Smoker     Packs/day: 0.25     Years: 3.00     Pack years: 0.75     Types: Cigarettes     Last attempt to quit: 2018     Years since quittin.6     Smokeless tobacco: Never Used     Tobacco comment: I use an e cig every now and than   Substance and Sexual Activity     Alcohol use: No     Comment: quit      Drug use: " No     Sexual activity: Yes     Partners: Female     Birth control/protection: None     Comment: no protection   Lifestyle     Physical activity     Days per week: 3 days     Minutes per session: 10 min     Stress: Not on file   Relationships     Social connections     Talks on phone: Not on file     Gets together: Not on file     Attends Yazidism service: Not on file     Active member of club or organization: Not on file     Attends meetings of clubs or organizations: Not on file     Relationship status: Not on file     Intimate partner violence     Fear of current or ex partner: Not on file     Emotionally abused: Not on file     Physically abused: Not on file     Forced sexual activity: Not on file   Other Topics Concern     Parent/sibling w/ CABG, MI or angioplasty before 65F 55M? No   Social History Narrative     Not on file     Family History   Problem Relation Age of Onset     Gastrointestinal Disease Mother         Crohns disease     Anxiety Disorder Mother      Thyroid Disease Mother         Grave's disease     Cancer Father         ear cancer-skin cancer/melanoma     Breast Cancer Maternal Grandmother      Macular Degeneration Maternal Grandfather      Anxiety Disorder Sister      Diabetes Maternal Uncle      Breast Cancer Other      Hypertension No family hx of      Hyperlipidemia No family hx of      Cerebrovascular Disease No family hx of      Prostate Cancer No family hx of      Depression No family hx of      Anesthesia Reaction No family hx of      Asthma No family hx of      Osteoporosis No family hx of      Genetic Disorder No family hx of      Obesity No family hx of      Mental Illness No family hx of      Substance Abuse No family hx of      Glaucoma No family hx of       ROS: 10 point ROS neg other than the symptoms noted above in the HPI.    PE:  B/P: 136/85, T: Data Unavailable, P: 89, R: Data Unavailable  General: well developed, well nourished WM who appears their stated age  HEENT: NC/AT,  EOMI, (-)icterus, (-)injection  Neck: Supple, No JVD, multiple incisions  Chest: CTA, multiple scars  Heart: S1, S2, (-)m/r/g  Abd: Soft, non tender, non distended, non tender, no masses, EC fistula LUQ   Ext; Warm, no edema  Psych: AAOx3  Neuro: No focal deficits    Impression/plan:  Patient is a 49-year-old white male status post a Celestino-en-Y gastric bypass with a complicated postoperative course with resulting in short gut syndrome and requiring TPN for nutritional support.  He has had multiple central access these in the past and a known central stenosis.  I discussed with the patient I do not have the equipment or ability to do the Cm in [place].  He may require venoplasty the place the catheter.  The plan at this time [facility] of a central catheter to facilitate nutrition.  He will be managed with a peripheral IV in the meantime though there is only last about 12 hours for him.  He knows to go to the ER should to be any further issues.  He will follow-up with me as necessary.    Parker to follow up with Primary Care provider regarding elevated blood pressure.    Drew Oseguera MD, FACS

## 2020-04-07 NOTE — TELEPHONE ENCOUNTER
Francisco Estrada this is the best # to call [phone] please call when you have a moment. . Thank You Teetee

## 2020-04-07 NOTE — NURSING NOTE
Call back from wife (CTC on file and verbal consent received from patient) patient wanted us to explain placement will need to be done at [facility] or [facility].....................

## 2020-04-07 NOTE — TELEPHONE ENCOUNTER
Call back, patient would like this number as the call back number for surgery.......will send with Dr. Oseguera.......................    ..........Natalie Crane CMA  ([facility])

## 2020-04-07 NOTE — TELEPHONE ENCOUNTER
IR PRE CALL NOTE:    Called patient in regards to his appointment 4/8 for tunneled central line. Patient did not answer, left a message to call back regarding some pre-call questions.    Karis Huerta RN     Was able to talk with wife, Rose regarding patient, very complicated history with multiple central lines in previously. Will have MD and NP review on 4/8 to make a plan. Per patients wife Rose patient can not tolerate versed and fentanyl, wife also states that he has extensive scars from previous central lines. Patient needs central access for IV hydration/TPN.

## 2020-04-07 NOTE — NURSING NOTE
Called and informed patient -placement will be done at [facility] scheduled at 8 am tomorrow. Patient informed NPO after midnight, drop off at [facility] lobby door, will be receiving call from [facility] to confirm..patient and spouse verbally state they understand........................................Natalie Crane CMA  ([facility])

## 2020-04-08 ENCOUNTER — HOME INFUSION (PRE-WILLOW HOME INFUSION) (OUTPATIENT)
Dept: PHARMACY | Facility: CLINIC | Age: 50
End: 2020-04-08

## 2020-04-08 ENCOUNTER — MYC MEDICAL ADVICE (OUTPATIENT)
Dept: INTERNAL MEDICINE | Facility: CLINIC | Age: 50
End: 2020-04-08

## 2020-04-08 DIAGNOSIS — E46 MALNUTRITION, UNSPECIFIED TYPE (H): Primary | ICD-10-CM

## 2020-04-08 NOTE — TELEPHONE ENCOUNTER
I addended Clarke's pre op from the 26th.  Please send it and let patient know. He should take his coreg but no other pills that day.

## 2020-04-09 NOTE — PROGRESS NOTES
This is a recent snapshot of the patient's [facility] medical record.  For current drug dose and complete information and questions, call [phone] or In Basket pool, [facility] home infusion ([id number])  CSN Number:  [id number]

## 2020-04-14 ENCOUNTER — HOSPITAL ENCOUNTER (OUTPATIENT)
Facility: CLINIC | Age: 50
Discharge: HOME | End: 2020-04-14
Attending: RADIOLOGY | Admitting: RADIOLOGY
Payer: COMMERCIAL

## 2020-04-14 ENCOUNTER — APPOINTMENT (OUTPATIENT)
Dept: MEDSURG UNIT | Facility: CLINIC | Age: 50
End: 2020-04-14
Attending: RADIOLOGY
Payer: COMMERCIAL

## 2020-04-14 ENCOUNTER — TELEPHONE (OUTPATIENT)
Dept: INTERNAL MEDICINE | Facility: CLINIC | Age: 50
End: 2020-04-14

## 2020-04-14 ENCOUNTER — APPOINTMENT (OUTPATIENT)
Dept: INTERVENTIONAL RADIOLOGY/VASCULAR | Facility: CLINIC | Age: 50
End: 2020-04-14
Attending: SPECIALIST
Payer: COMMERCIAL

## 2020-04-14 VITALS
WEIGHT: 181 LBS | HEART RATE: 75 BPM | HEIGHT: 71 IN | SYSTOLIC BLOOD PRESSURE: 113 MMHG | TEMPERATURE: 97.9 F | BODY MASS INDEX: 25.34 KG/M2 | RESPIRATION RATE: 16 BRPM | OXYGEN SATURATION: 97 % | DIASTOLIC BLOOD PRESSURE: 64 MMHG

## 2020-04-14 DIAGNOSIS — I87.8 VENOFIBROSIS: ICD-10-CM

## 2020-04-14 DIAGNOSIS — E46 MALNUTRITION (H): ICD-10-CM

## 2020-04-14 DIAGNOSIS — E61.1 IRON DEFICIENCY: ICD-10-CM

## 2020-04-14 DIAGNOSIS — Z98.84 S/P BARIATRIC SURGERY: ICD-10-CM

## 2020-04-14 DIAGNOSIS — E44.0 MODERATE PROTEIN-CALORIE MALNUTRITION (H): ICD-10-CM

## 2020-04-14 LAB
ANION GAP SERPL CALCULATED.3IONS-SCNC: 3 MMOL/L (ref 3–14)
BUN SERPL-MCNC: 13 MG/DL (ref 7–30)
CALCIUM SERPL-MCNC: 8.2 MG/DL (ref 8.5–10.1)
CHLORIDE SERPL-SCNC: 107 MMOL/L (ref 94–109)
CO2 SERPL-SCNC: 31 MMOL/L (ref 20–32)
CREAT SERPL-MCNC: 0.72 MG/DL (ref 0.66–1.25)
ERYTHROCYTE [DISTWIDTH] IN BLOOD BY AUTOMATED COUNT: 14.3 % (ref 10–15)
GFR SERPL CREATININE-BSD FRML MDRD: >90 ML/MIN/{1.73_M2}
GLUCOSE SERPL-MCNC: 89 MG/DL (ref 70–99)
HCT VFR BLD AUTO: 37 % (ref 40–53)
HGB BLD-MCNC: 11.3 G/DL (ref 13.3–17.7)
INR PPP: 1.12 (ref 0.86–1.14)
MAGNESIUM SERPL-MCNC: 2 MG/DL (ref 1.6–2.3)
MCH RBC QN AUTO: 29.6 PG (ref 26.5–33)
MCHC RBC AUTO-ENTMCNC: 30.5 G/DL (ref 31.5–36.5)
MCV RBC AUTO: 97 FL (ref 78–100)
PHOSPHATE SERPL-MCNC: 3.9 MG/DL (ref 2.5–4.5)
PLATELET # BLD AUTO: 206 10E9/L (ref 150–450)
POTASSIUM SERPL-SCNC: 3.8 MMOL/L (ref 3.4–5.3)
RBC # BLD AUTO: 3.82 10E12/L (ref 4.4–5.9)
SODIUM SERPL-SCNC: 141 MMOL/L (ref 133–144)
WBC # BLD AUTO: 7.7 10E9/L (ref 4–11)

## 2020-04-14 PROCEDURE — 85610 PROTHROMBIN TIME: CPT | Performed by: RADIOLOGY

## 2020-04-14 PROCEDURE — 25800030 ZZH RX IP 258 OP 636: Performed by: PHYSICIAN ASSISTANT

## 2020-04-14 PROCEDURE — 76937 US GUIDE VASCULAR ACCESS: CPT

## 2020-04-14 PROCEDURE — 85027 COMPLETE CBC AUTOMATED: CPT | Performed by: RADIOLOGY

## 2020-04-14 PROCEDURE — 27210908 ZZH NEEDLE CR4

## 2020-04-14 PROCEDURE — 25000128 H RX IP 250 OP 636: Performed by: PHYSICIAN ASSISTANT

## 2020-04-14 PROCEDURE — C1769 GUIDE WIRE: HCPCS

## 2020-04-14 PROCEDURE — 36558 INSERT TUNNELED CV CATH: CPT | Mod: LT

## 2020-04-14 PROCEDURE — 99152 MOD SED SAME PHYS/QHP 5/>YRS: CPT

## 2020-04-14 PROCEDURE — 83735 ASSAY OF MAGNESIUM: CPT | Performed by: SURGERY

## 2020-04-14 PROCEDURE — 27210732 ZZH ACCESSORY CR1

## 2020-04-14 PROCEDURE — C1751 CATH, INF, PER/CENT/MIDLINE: HCPCS

## 2020-04-14 PROCEDURE — 40000166 ZZH STATISTIC PP CARE STAGE 1

## 2020-04-14 PROCEDURE — 80048 BASIC METABOLIC PNL TOTAL CA: CPT | Performed by: RADIOLOGY

## 2020-04-14 PROCEDURE — 27211193 ZZ H WOUND GLUE CR1

## 2020-04-14 PROCEDURE — 84100 ASSAY OF PHOSPHORUS: CPT | Performed by: RADIOLOGY

## 2020-04-14 PROCEDURE — 25000125 ZZHC RX 250: Performed by: PHYSICIAN ASSISTANT

## 2020-04-14 PROCEDURE — 83735 ASSAY OF MAGNESIUM: CPT | Performed by: RADIOLOGY

## 2020-04-14 RX ORDER — SODIUM CHLORIDE 9 MG/ML
INJECTION, SOLUTION INTRAVENOUS CONTINUOUS
Status: DISCONTINUED | OUTPATIENT
Start: 2020-04-14 | End: 2020-04-14 | Stop reason: HOSPADM

## 2020-04-14 RX ORDER — NICOTINE POLACRILEX 4 MG
15-30 LOZENGE BUCCAL
Status: DISCONTINUED | OUTPATIENT
Start: 2020-04-14 | End: 2020-04-14 | Stop reason: HOSPADM

## 2020-04-14 RX ORDER — CLINDAMYCIN PHOSPHATE 900 MG/50ML
900 INJECTION, SOLUTION INTRAVENOUS
Status: DISCONTINUED | OUTPATIENT
Start: 2020-04-14 | End: 2020-04-14 | Stop reason: HOSPADM

## 2020-04-14 RX ORDER — HEPARIN SODIUM,PORCINE 10 UNIT/ML
5 VIAL (ML) INTRAVENOUS
Status: COMPLETED | OUTPATIENT
Start: 2020-04-14 | End: 2020-04-14

## 2020-04-14 RX ORDER — HEPARIN SODIUM,PORCINE 10 UNIT/ML
5-10 VIAL (ML) INTRAVENOUS
Status: DISCONTINUED | OUTPATIENT
Start: 2020-04-14 | End: 2020-04-14 | Stop reason: HOSPADM

## 2020-04-14 RX ORDER — CLINDAMYCIN PHOSPHATE 900 MG/50ML
900 INJECTION, SOLUTION INTRAVENOUS
Status: COMPLETED | OUTPATIENT
Start: 2020-04-14 | End: 2020-04-14

## 2020-04-14 RX ORDER — LIDOCAINE 40 MG/G
CREAM TOPICAL
Status: DISCONTINUED | OUTPATIENT
Start: 2020-04-14 | End: 2020-04-14 | Stop reason: HOSPADM

## 2020-04-14 RX ORDER — HEPARIN SODIUM,PORCINE 10 UNIT/ML
5 VIAL (ML) INTRAVENOUS EVERY 24 HOURS
Status: DISCONTINUED | OUTPATIENT
Start: 2020-04-14 | End: 2020-04-14 | Stop reason: HOSPADM

## 2020-04-14 RX ORDER — DEXTROSE MONOHYDRATE 25 G/50ML
25-50 INJECTION, SOLUTION INTRAVENOUS
Status: DISCONTINUED | OUTPATIENT
Start: 2020-04-14 | End: 2020-04-14 | Stop reason: HOSPADM

## 2020-04-14 RX ORDER — FENTANYL CITRATE 50 UG/ML
25-50 INJECTION, SOLUTION INTRAMUSCULAR; INTRAVENOUS EVERY 5 MIN PRN
Status: DISCONTINUED | OUTPATIENT
Start: 2020-04-14 | End: 2020-04-14 | Stop reason: HOSPADM

## 2020-04-14 RX ORDER — NALOXONE HYDROCHLORIDE 0.4 MG/ML
.1-.4 INJECTION, SOLUTION INTRAMUSCULAR; INTRAVENOUS; SUBCUTANEOUS
Status: DISCONTINUED | OUTPATIENT
Start: 2020-04-14 | End: 2020-04-14 | Stop reason: HOSPADM

## 2020-04-14 RX ORDER — FLUMAZENIL 0.1 MG/ML
0.2 INJECTION, SOLUTION INTRAVENOUS
Status: DISCONTINUED | OUTPATIENT
Start: 2020-04-14 | End: 2020-04-14 | Stop reason: HOSPADM

## 2020-04-14 RX ADMIN — FENTANYL CITRATE 125 MCG: 50 INJECTION INTRAMUSCULAR; INTRAVENOUS at 14:40

## 2020-04-14 RX ADMIN — LIDOCAINE HYDROCHLORIDE 8 ML: 10 INJECTION, SOLUTION EPIDURAL; INFILTRATION; INTRACAUDAL; PERINEURAL at 14:39

## 2020-04-14 RX ADMIN — CLINDAMYCIN IN 5 PERCENT DEXTROSE 900 MG: 18 INJECTION, SOLUTION INTRAVENOUS at 12:15

## 2020-04-14 RX ADMIN — Medication 5 ML: at 14:40

## 2020-04-14 RX ADMIN — MIDAZOLAM 2.5 MG: 1 INJECTION INTRAMUSCULAR; INTRAVENOUS at 14:39

## 2020-04-14 RX ADMIN — SODIUM CHLORIDE: 9 INJECTION, SOLUTION INTRAVENOUS at 12:15

## 2020-04-14 ASSESSMENT — MIFFLIN-ST. JEOR: SCORE: 1718.14

## 2020-04-14 NOTE — DISCHARGE INSTRUCTIONS
Interventional Radiology                                                                   Discharge Instructions                                                                Following Tunneled Catheter Placement    Your site(s) has been closed with:     The Catheter is sutured  to skin at  insertion site    Derma Peña (Skin Glue) on neck incision    Do not apply any ointments over site    This thin layer will slough off in 7-10 days               May gently remove Derma Peña in 10 days if still present         Tunneled catheter is always covered with a Sterile Transparent dressing    Keep site clean and dry     Cover with an occlusive dressing for showering    Weekly transparent dressing changes or when it becomes wet or dirty     If there is any oozing or bleeding from the site, apply direct pressure for 5-10 minutes with a gauze pad.  If bleeding continues after 10 minutes call your primary doctor.  If bleeding cannot be controlled with direct pressure, call 911.    Call your Doctor if:    Bleeding as above    Swelling in your neck or arm    Sudden onset of shortness of breath, lightheadedness, or heart palpitations..    Fever greater than 100.5  F    Other signs of infection such as, redness, tenderness, or drainage from the wound.    If you were given sedation:    We recommend an adult stay with you for the first 24 hours.    No driving or alcoholic beverages for 24 hours.    ADDITIONAL INSTRUCTIONS:          If you are on Coumadin you may restart tonight.  Follow up Coumadin Clinic or Primary Care MD         To have your INR rechecked.           No heavy lifting greater than 10 lbs for 3 days.           May use ice pack for pain or minor swelling for 15 min 3-4 times per day.    [facility] Interventional Radiology Department    Physician:  Per Dumont PA-C                                  Date:April 14, 2020  Telephone Numbers:  [phone].....8  am to 4:30 pm   Monday-Friday  [facility] : [phone]....After hours, Weekends, & Holidays.  Ask for the Interventional Radiologist on call.  Someone is on call 24 hours a day.   Emergency Department:  [phone]

## 2020-04-14 NOTE — TELEPHONE ENCOUNTER
Reason for Call:  Form, our goal is to have forms completed with 72 hours, however, some forms may require a visit or additional information.    Type of letter, form or note:  Home Health Certification    Who is the form from?: Home care    Where did the form come from: form was faxed in    What clinic location was the form placed at?: [facility]    Where the form was placed: Given to MA/RN    What number is listed as a contact on the form?: [phone]       Additional comments:     Call taken on 4/14/2020 at 10:12 AM by Danielle Solis

## 2020-04-14 NOTE — PROCEDURES
[facility], [place]    Procedure: IR Procedure Note    Date/Time: 4/14/2020 2:36 PM  Performed by: Per Dumont PA-C  Authorized by: Per Dumont PA-C     UNIVERSAL PROTOCOL   Site Marked: NA  Prior Images Obtained and Reviewed:  Yes  Required items: Required blood products, implants, devices and special equipment available    Patient identity confirmed:  Verbally with patient, arm band, provided demographic data and hospital-assigned identification number  Patient was reevaluated immediately before administering moderate or deep sedation or anesthesia  Confirmation Checklist:  Patient's identity using two indicators, relevant allergies, procedure was appropriate and matched the consent or emergent situation and correct equipment/implants were available  Time out: Immediately prior to the procedure a time out was called    Universal Protocol: the Joint Commission Universal Protocol was followed    Preparation: Patient was prepped and draped in usual sterile fashion           ANESTHESIA    Anesthesia: Local infiltration  Local Anesthetic:  Lidocaine 1% without epinephrine  Anesthetic Total (mL):  6      SEDATION    Patient Sedated: Yes    Sedation Type:  Moderate (conscious) sedation  Sedation:  Fentanyl and midazolam  Vital signs: Vital signs monitored during sedation    Fluoroscopy Time: 1 minute(s)  See dictated procedure note for full details.  Findings: Moderate sedation for TCVC placement - 2.5 mg midazolam and 125 mcg of fentanyl.     Specimens: none    Complications: None    Condition: Stable    Plan: 1 hour bedrest    PROCEDURE   Patient Tolerance:  Patient tolerated the procedure well with no immediate complications  Describe Procedure: Completed image-guided placement of 6 Martiniquais, 28 cm double lumen tunneled central venous catheter via right IJ. Aspirates and flushes freely, heparin locked and ready for immediate use. Tip in high right atrium.  Length of time  physician/provider present for 1:1 monitoring during sedation: 15

## 2020-04-14 NOTE — PROGRESS NOTES
Patient Name: Parker Acevedo  Medical Record Number: [id number]  Today's Date: 4/14/2020    Procedure: Tunneled, double-lumen central venous catheter placement  Proceduralist: Per Dumont PA-C    Procedure Start: 1405  Procedure end: 1420  Sedation medications administered: 125 mcg fentanyl and 2.5 mg versed     Report given to: 2A RN    Other Notes: Pt arrived to IR room 4 from . Consent reviewed. Pt denies any questions or concerns regarding procedure. Pt positioned supine and monitored per protocol. Pt tolerated procedure without any noted complications. Pt transferred back to .

## 2020-04-14 NOTE — IP AVS SNAPSHOT
[address] [address]                                    After Visit Summary   4/14/2020    Parker Acevedo    MRN: [id number]           After Visit Summary Signature Page    I have received my discharge instructions, and my questions have been answered. I have discussed any challenges I see with this plan with the nurse or doctor.    ..........................................................................................................................................  Patient/Patient Representative Signature      ..........................................................................................................................................  Patient Representative Print Name and Relationship to Patient    ..................................................               ................................................  Date                                   Time    ..........................................................................................................................................  Reviewed by Signature/Title    ...................................................              ..............................................  Date                                               Time          22EPIC Rev 08/18

## 2020-04-14 NOTE — IP AVS SNAPSHOT
MRN:[id number]                      After Visit Summary   4/14/2020    Parker Acevedo    MRN: [id number]           Visit Information        Department      4/14/2020 10:58 AM [address] [facility]          Review of your medicines      UNREVIEWED medicines. Ask your doctor about these medicines       Dose / Directions   acetaminophen 32 mg/mL liquid  Commonly known as:  TYLENOL  Used for:  S/P bariatric surgery      Dose:  500 mg  Take 15.65 mLs (500 mg) by mouth every 4 hours as needed for fever or mild pain  Quantity:  455 mL  Refills:  1     albuterol 108 (90 Base) MCG/ACT inhaler  Commonly known as:  PROAIR HFA/PROVENTIL HFA/VENTOLIN HFA  Used for:  Productive cough      Dose:  2 puff  Inhale 2 puffs into the lungs every 4 hours as needed for shortness of breath / dyspnea or wheezing  Quantity:  1 Inhaler  Refills:  1     amphetamine-dextroamphetamine 20 MG tablet  Commonly known as:  Adderall  Used for:  ADHD (attention deficit hyperactivity disorder), inattentive type      TAKE ONE TABLET BY MOUTH ONCE DAILY  Quantity:  30 tablet  Refills:  0     blood glucose lancets standard  Commonly known as:  NO BRAND SPECIFIED  Used for:  Elevated glucose level  Ask about: Should I take this medication?      Use to test blood sugar 1 times daily or as directed.  Quantity:  100 each  Refills:  1     blood glucose monitoring meter device kit  Commonly known as:  NO BRAND SPECIFIED  Used for:  Elevated glucose level  Ask about: Should I take this medication?      Use to test blood sugar 1 times daily or as directed.  Quantity:  1 kit  Refills:  0     blood glucose test strip  Commonly known as:  NO BRAND SPECIFIED  Used for:  Elevated glucose level  Ask about: Should I take this medication?      Use to test blood sugar 1 times daily or as directed.  Quantity:  100 strip  Refills:  3     carvedilol 6.25 MG tablet  Commonly known as:  COREG      Dose:  6.25 mg  Take 6.25 mg by mouth 2 times daily  "(with meals)  Refills:  0     cyanocobalamin 1000 MCG/ML injection  Commonly known as:  CYANOCOBALAMIN  Used for:  Vitamin B12 deficiency (non anemic)      Dose:  1 mL  Inject 1 mL (1,000 mcg) into the muscle every 30 days  Quantity:  1 mL  Refills:  11     [place] [facility]NAGED PATIENT  Used for:  S/P bariatric surgery      Contact [facility] for patient specific medication information at [phone] on admission and discharge from the hospital.  Phones are answered 24 hours a day 7 days a week 365 days a year.    Providers - Choose \"CONTINUE HOME MED (no script)\" at discharge if patient treatment with home infusion will continue.  Refills:  0     fentaNYL 25 mcg/hr 72 hr patch  Commonly known as:  DURAGESIC  Used for:  Chronic abdominal pain, Chronic pain syndrome      Dose:  1 patch  Place 1 patch onto the skin every 48 hours remove old patch.   May fill 4/3/20 and start 4/5/20  Quantity:  15 patch  Refills:  0     lidocaine 5 % patch  Commonly known as:  LIDODERM  Used for:  Chronic bilateral low back pain without sciatica, Chronic abdominal pain, Myofascial pain      Dose:  1-2 patch  Place 1-2 patches onto the skin every 24 hours Wear for 12 hours, remove for 12 hours.  OK to cut to better fit to size.  Quantity:  60 patch  Refills:  6     lidocaine, alum & mag hydroxide-simethicone GI Cocktail  Used for:  Gastric bypass status for obesity      30 ml daily as needed for mouth/stomach pain.  Quantity:  1 Bottle  Refills:  1     LORazepam 1 MG tablet  Commonly known as:  ATIVAN  Used for:  ADHD (attention deficit hyperactivity disorder), inattentive type      TAKE 1 TABLET (1MG) BY MOUTH AS NEEDED FOR ANXIETY WITH TPN AND MEDICATIONS . JUST ONCE A DAY (30 TO LAST 30 DAYS)  Quantity:  30 tablet  Refills:  0     naloxone 4 MG/0.1ML nasal spray  Commonly known as:  NARCAN  Used for:  Chronic pain syndrome      Dose:  4 mg  Spray 1 spray (4 mg) into one nostril alternating nostrils as needed " "for opioid reversal every 2-3 minutes until assistance arrives  Quantity:  0.2 mL  Refills:  0     ondansetron 8 MG ODT tab  Commonly known as:  ZOFRAN-ODT  Used for:  Nausea      DISSOLVE ONE TABLET ON TONGUE EVERY 8 HOURS AS NEEDED FOR NAUSEA  Quantity:  90 tablet  Refills:  1     oxyCODONE 5 MG/5ML solution  Commonly known as:  ROXICODONE  Used for:  Chronic abdominal pain      Dose:  5-10 mg  Take 5-10 mLs (5-10 mg) by mouth 2 times daily as needed for pain Max of 20mg/day. 30 day supply. May fill on 4/10/20 to start on 4/12/20  Quantity:  600 mL  Refills:  0     pantoprazole 40 MG EC tablet  Commonly known as:  PROTONIX  Used for:  S/P bariatric surgery      Dose:  40 mg  Take 1 tablet (40 mg) by mouth daily  Quantity:  30 tablet  Refills:  8     polyethylene glycol 17 GM/SCOOP powder  Commonly known as:  MIRALAX      Dose:  1 capful  Take 17 g (1 capful) by mouth daily  Quantity:  578 g  Refills:  11     sodium chloride 0.9 % infusion      Dose:  1,000-2,000 mL  Inject 1,000-2,000 mLs into the vein as needed  Refills:  0     sucralfate 1 GM tablet  Commonly known as:  CARAFATE  Used for:  Gastric bypass status for obesity      Dose:  1 g  Take 1 tablet (1 g) by mouth 4 times daily  Quantity:  120 tablet  Refills:  3     UNABLE TO FIND      States takes TPN 2050 ml  Every 14 hours through PICC  Refills:  0     vitamin D2 69974 units (1250 mcg) capsule  Commonly known as:  ERGOCALCIFEROL  Used for:  Vitamin D deficiency      Dose:  50,000 Units  Take 1 capsule (50,000 Units) by mouth once a week  Quantity:  12 capsule  Refills:  3        CONTINUE these medicines which have NOT CHANGED       Dose / Directions   Needle (Disp) 27G X 1/2\" Misc  Commonly known as:  BD Disp Needles  Used for:  Bariatric surgery status      Dose:  1 Device  1 Device every 30 days Use for cyanocobalamin injection once q 30 days.  Quantity:  3 each  Refills:  4     order for DME  Used for:  Bilateral edema of lower extremity      " Equipment being ordered: Bilateral knee high chronic venous insufficiency stockings--  mild-moderate pressures.  Quantity:  4 each  Refills:  5     order for DME  Used for:  S/P bariatric surgery      Equipment being ordered: Urine Drainage bag, leg.  Quantity:  15 Units  Refills:  11              Protect others around you: Learn how to safely use, store and throw away your medicines at www.disposemymeds.org.       Follow-ups after your visit       Your next 10 appointments already scheduled    Jun 22, 2020 11:30 AM CDT  Telephone Visit with Patti Milligan MD  [facility] ([facility]) [id number] [address] [phone]   [facility]  Note: this is not an onsite visit; there is no need to come to the facility.  Please have a list of all current medications available for appointment.         Care Instructions       Further instructions from your care team                                                                        Interventional Radiology                                                                   Discharge Instructions                                                                Following Tunneled Catheter Placement    Your site(s) has been closed with:     The Catheter is sutured  to skin at  insertion site    Derma Peña (Skin Glue) on neck incision    Do not apply any ointments over site    This thin layer will slough off in 7-10 days               May gently remove Derma Peña in 10 days if still present         Tunneled catheter is always covered with a Sterile Transparent dressing    Keep site clean and dry     Cover with an occlusive dressing for showering    Weekly transparent dressing changes or when it becomes wet or dirty     If there is any oozing or bleeding from the site, apply direct pressure for 5-10 minutes with a gauze pad.  If bleeding continues after 10 minutes call your primary doctor.  If bleeding  cannot be controlled with direct pressure, call [phone].    Call your Doctor if:    Bleeding as above    Swelling in your neck or arm    Sudden onset of shortness of breath, lightheadedness, or heart palpitations..    Fever greater than 100.5  F    Other signs of infection such as, redness, tenderness, or drainage from the wound.    If you were given sedation:    We recommend an adult stay with you for the first 24 hours.    No driving or alcoholic beverages for 24 hours.    ADDITIONAL INSTRUCTIONS:          If you are on Coumadin you may restart tonight.  Follow up Coumadin Clinic or Primary Care MD         To have your INR rechecked.           No heavy lifting greater than 10 lbs for 3 days.           May use ice pack for pain or minor swelling for 15 min 3-4 times per day.    [facility] Interventional Radiology Department    Physician:  Per Dumont PA-C                                  Date:April 14, 2020  Telephone Numbers:  [phone].....8 am to 4:30 pm   Monday-Friday  Hospital : [phone]....After hours, Weekends, & Holidays.  Ask for the Interventional Radiologist on call.  Someone is on call 24 hours a day.   Emergency Department:  [phone]                                                                                                                                                              Additional Information About Your Visit       [employer] Information    [employer] gives you secure access to your electronic health record. If you see a primary care provider, you can also send messages to your care team and make appointments. If you have questions, please call your primary care clinic.  If you do not have a primary care provider, please call [phone] and they will assist you.       Care EveryWhere ID    This is your Care EveryWhere ID. This could be used by other organizations to access your [place] medical records  [id number]       Your Vitals Were  Most recent update: 4/14/2020  3:00 PM  "   Blood Pressure   116/74 (BP Location: Right arm)          Pulse   75          Temperature   97.9  F (36.6  C) (Oral)          Respirations   16          Height   1.803 m (5' 11\")             Weight   82.1 kg (181 lb)    Pulse Oximetry   99%    BMI (Body Mass Index)   25.24 kg/m           Primary Care Provider Office Phone # Fax #    Chuy Isaac [phone]      Equal Access to Services    KATHRYN GUZMAN : Hadii aad ku hadasho Soomaali, waaxda luqadaha, qaybta kaalmada adeegyada, waxay idiin hayaan adeeg kharash la'yosefn . So [facility] [phone].    ATENCIÓN: Si habla espmaicol, tiene a hicks disposición servicios gratuitos de asistencia lingüística. Bobame al [phone].    We comply with applicable federal and state civil rights laws, including the [place] Human Rights Act. We do not discriminate on the basis of race, color, creed, [place], national origin, marital status, age, disability, sex, sexual orientation, or gender identity.       Thank you!    Thank you for choosing [facility] for your care. Our goal is always to provide you with excellent care. Hearing back from our patients is one way we can continue to improve our services. Please take a few minutes to complete the written survey that you may receive in the mail after you visit with us. Thank you!            Medication List      Medications          Morning Afternoon Evening Bedtime As Needed    Needle (Disp) 27G X 1/2\" Misc  Also known as:  BD Disp Needles  INSTRUCTIONS:  1 Device every 30 days Use for cyanocobalamin injection once q 30 days.                     order for DME  INSTRUCTIONS:  Equipment being ordered: Bilateral knee high chronic venous insufficiency stockings--  mild-moderate pressures.                     order for DME  INSTRUCTIONS:  Equipment being ordered: Urine Drainage bag, leg.                       ASK your doctor about these medications          Morning Afternoon Evening Bedtime As Needed    acetaminophen 32 " "mg/mL liquid  Also known as:  TYLENOL  INSTRUCTIONS:  Take 15.65 mLs (500 mg) by mouth every 4 hours as needed for fever or mild pain                     albuterol 108 (90 Base) MCG/ACT inhaler  Also known as:  PROAIR HFA/PROVENTIL HFA/VENTOLIN HFA  INSTRUCTIONS:  Inhale 2 puffs into the lungs every 4 hours as needed for shortness of breath / dyspnea or wheezing  Doctor's comments:  Pharmacy may dispense brand covered by insurance (Proair, or proventil or ventolin or generic albuterol inhaler)                     amphetamine-dextroamphetamine 20 MG tablet  Also known as:  Adderall  INSTRUCTIONS:  TAKE ONE TABLET BY MOUTH ONCE DAILY  Doctor's comments:  Ok to fill on April 2nd.                     blood glucose lancets standard  Also known as:  NO BRAND SPECIFIED  INSTRUCTIONS:  Use to test blood sugar 1 times daily or as directed.  Ask about: Should I take this medication?                     blood glucose monitoring meter device kit  Also known as:  NO BRAND SPECIFIED  INSTRUCTIONS:  Use to test blood sugar 1 times daily or as directed.  Ask about: Should I take this medication?                     blood glucose test strip  Also known as:  NO BRAND SPECIFIED  INSTRUCTIONS:  Use to test blood sugar 1 times daily or as directed.  Ask about: Should I take this medication?                     carvedilol 6.25 MG tablet  Also known as:  COREG  INSTRUCTIONS:  Take 6.25 mg by mouth 2 times daily (with meals)                     cyanocobalamin 1000 MCG/ML injection  Also known as:  CYANOCOBALAMIN  INSTRUCTIONS:  Inject 1 mL (1,000 mcg) into the muscle every 30 days                     [place] [facility] MANAGED PATIENT  INSTRUCTIONS:  Contact [facility] for patient specific medication information at [phone] on admission and discharge from the hospital.  Phones are answered 24 hours a day 7 days a week 365 days a year.    Providers - Choose \"CONTINUE HOME MED (no script)\" at discharge if patient " treatment with home infusion will continue.  Doctor's comments:  Resume prior to admission TPN/Lipids/saline                     fentaNYL 25 mcg/hr 72 hr patch  Also known as:  DURAGESIC  INSTRUCTIONS:  Place 1 patch onto the skin every 48 hours remove old patch.   May fill 4/3/20 and start 4/5/20                     lidocaine 5 % patch  Also known as:  LIDODERM  INSTRUCTIONS:  Place 1-2 patches onto the skin every 24 hours Wear for 12 hours, remove for 12 hours.  OK to cut to better fit to size.  LAST TAKEN:  Ask your nurse or doctor                     lidocaine, alum & mag hydroxide-simethicone GI Cocktail  INSTRUCTIONS:  30 ml daily as needed for mouth/stomach pain.                     LORazepam 1 MG tablet  Also known as:  ATIVAN  INSTRUCTIONS:  TAKE 1 TABLET (1MG) BY MOUTH AS NEEDED FOR ANXIETY WITH TPN AND MEDICATIONS . JUST ONCE A DAY (30 TO LAST 30 DAYS)  Doctor's comments:  Can fill on April 2nd                     naloxone 4 MG/0.1ML nasal spray  Also known as:  NARCAN  INSTRUCTIONS:  Spray 1 spray (4 mg) into one nostril alternating nostrils as needed for opioid reversal every 2-3 minutes until assistance arrives                     ondansetron 8 MG ODT tab  Also known as:  ZOFRAN-ODT  INSTRUCTIONS:  DISSOLVE ONE TABLET ON TONGUE EVERY 8 HOURS AS NEEDED FOR NAUSEA                     oxyCODONE 5 MG/5ML solution  Also known as:  ROXICODONE  INSTRUCTIONS:  Take 5-10 mLs (5-10 mg) by mouth 2 times daily as needed for pain Max of 20mg/day. 30 day supply. May fill on 4/10/20 to start on 4/12/20                     pantoprazole 40 MG EC tablet  Also known as:  PROTONIX  INSTRUCTIONS:  Take 1 tablet (40 mg) by mouth daily                     polyethylene glycol 17 GM/SCOOP powder  Also known as:  MIRALAX  INSTRUCTIONS:  Take 17 g (1 capful) by mouth daily                     sodium chloride 0.9 % infusion  INSTRUCTIONS:  Inject 1,000-2,000 mLs into the vein as needed  LAST TAKEN:  April 14, 2020 12:15 PM                      sucralfate 1 GM tablet  Also known as:  CARAFATE  INSTRUCTIONS:  Take 1 tablet (1 g) by mouth 4 times daily                     UNABLE TO FIND  INSTRUCTIONS:  States takes TPN 2050 ml  Every 14 hours through PICC                     vitamin D2 70149 units (1250 mcg) capsule  Also known as:  ERGOCALCIFEROL  INSTRUCTIONS:  Take 1 capsule (50,000 Units) by mouth once a week

## 2020-04-14 NOTE — PROGRESS NOTES
Returned from IR to 2A.  Denies pain.  Right neck & right upper chest sites C/D/I.  2 lines intact & clamped.  Provided with po fluids & food.

## 2020-04-14 NOTE — PROGRESS NOTES
This is a recent snapshot of the patient's [facility] medical record.  For current drug dose and complete information and questions, call [phone] or In Basket pool, fv home infusion ([id number])  CSN Number:  [id number]

## 2020-04-14 NOTE — PROGRESS NOTES
Patient tolerated recovery stage well. VSS, right neck and right chest sites clean/dry/intact, no hematomas, and denies pain. Patient tolerated PO food and fluids. Teaching was done and discharge instructions were given. Patient ambulated, voided, and PIV was removed. Patient discharged from the hospital via to home with family @ 1428.

## 2020-04-14 NOTE — TELEPHONE ENCOUNTER
Patient currently hospitalized. Expecting [facility] med list to change. Sending to PCP as is for sig.  [place] RN

## 2020-04-14 NOTE — PRE-PROCEDURE
GENERAL PRE-PROCEDURE:   Procedure:  TCVC Placement  Date/Time:  4/14/2020 1:12 PM    Verbal consent obtained?: Yes    Written consent obtained?: Yes    Risks and benefits: Risks, benefits and alternatives were discussed    Consent given by:  Patient  Patient states understanding of procedure being performed: Yes    Patient's understanding of procedure matches consent: Yes    Procedure consent matches procedure scheduled: Yes    Expected level of sedation:  Moderate  Appropriately NPO:  Yes  ASA Class:  Class 2- mild systemic disease, no acute problems, no functional limitations  Mallampati  :  Grade 2- soft palate, base of uvula, tonsillar pillars, and portion of posterior pharyngeal wall visible  Lungs:  Lungs clear with good breath sounds bilaterally  Heart:  Normal heart sounds and rate  History & Physical reviewed:  History and physical reviewed and no updates needed  Statement of review:  I have reviewed the lab findings, diagnostic data, medications, and the plan for sedation

## 2020-04-14 NOTE — PROGRESS NOTES
Patient arrived on 2A for tunneled line placement.  IV placed, labs sent.  Consent being obtained currently.  Awaiting lab results, otherwise prep complete.

## 2020-04-15 ENCOUNTER — HOME INFUSION (PRE-WILLOW HOME INFUSION) (OUTPATIENT)
Dept: PHARMACY | Facility: CLINIC | Age: 50
End: 2020-04-15

## 2020-04-16 NOTE — PROGRESS NOTES
This is a recent snapshot of the patient's [facility] medical record.  For current drug dose and complete information and questions, call [phone] or In Basket pool, fv home infusion ([id number])  CSN Number:  [id number]

## 2020-04-19 DIAGNOSIS — Z53.9 DIAGNOSIS NOT YET DEFINED: Primary | ICD-10-CM

## 2020-04-19 PROCEDURE — G0179 MD RECERTIFICATION HHA PT: HCPCS | Performed by: INTERNAL MEDICINE

## 2020-04-20 ENCOUNTER — MEDICAL CORRESPONDENCE (OUTPATIENT)
Dept: HEALTH INFORMATION MANAGEMENT | Facility: CLINIC | Age: 50
End: 2020-04-20

## 2020-04-20 ENCOUNTER — HOSPITAL ENCOUNTER (OUTPATIENT)
Dept: LAB | Facility: CLINIC | Age: 50
Discharge: HOME | End: 2020-04-20
Attending: SURGERY | Admitting: SURGERY
Payer: COMMERCIAL

## 2020-04-20 LAB
ALBUMIN SERPL-MCNC: 3 G/DL (ref 3.4–5)
ALP SERPL-CCNC: 68 U/L (ref 40–150)
ALT SERPL W P-5'-P-CCNC: 20 U/L (ref 0–70)
ANION GAP SERPL CALCULATED.3IONS-SCNC: 6 MMOL/L (ref 3–14)
AST SERPL W P-5'-P-CCNC: 14 U/L (ref 0–45)
BASOPHILS # BLD AUTO: 0 10E9/L (ref 0–0.2)
BASOPHILS NFR BLD AUTO: 0.7 %
BILIRUB SERPL-MCNC: 0.2 MG/DL (ref 0.2–1.3)
BUN SERPL-MCNC: 13 MG/DL (ref 7–30)
CALCIUM SERPL-MCNC: 7.9 MG/DL (ref 8.5–10.1)
CHLORIDE SERPL-SCNC: 110 MMOL/L (ref 94–109)
CO2 SERPL-SCNC: 27 MMOL/L (ref 20–32)
CREAT SERPL-MCNC: 0.68 MG/DL (ref 0.66–1.25)
DIFFERENTIAL METHOD BLD: ABNORMAL
EOSINOPHIL NFR BLD AUTO: 4 %
ERYTHROCYTE [DISTWIDTH] IN BLOOD BY AUTOMATED COUNT: 14.3 % (ref 10–15)
GFR SERPL CREATININE-BSD FRML MDRD: >90 ML/MIN/{1.73_M2}
GLUCOSE SERPL-MCNC: 112 MG/DL (ref 70–99)
HCT VFR BLD AUTO: 35.5 % (ref 40–53)
HGB BLD-MCNC: 11.4 G/DL (ref 13.3–17.7)
IMM GRANULOCYTES # BLD: 0 10E9/L (ref 0–0.4)
IMM GRANULOCYTES NFR BLD: 0.4 %
LYMPHOCYTES # BLD AUTO: 1.5 10E9/L (ref 0.8–5.3)
LYMPHOCYTES NFR BLD AUTO: 26.4 %
MAGNESIUM SERPL-MCNC: 1.9 MG/DL (ref 1.6–2.3)
MCH RBC QN AUTO: 30 PG (ref 26.5–33)
MCHC RBC AUTO-ENTMCNC: 32.1 G/DL (ref 31.5–36.5)
MCV RBC AUTO: 93 FL (ref 78–100)
MONOCYTES # BLD AUTO: 0.6 10E9/L (ref 0–1.3)
MONOCYTES NFR BLD AUTO: 10.5 %
NEUTROPHILS # BLD AUTO: 3.2 10E9/L (ref 1.6–8.3)
NEUTROPHILS NFR BLD AUTO: 58 %
NRBC # BLD AUTO: 0 10*3/UL
NRBC BLD AUTO-RTO: 0 /100
PHOSPHATE SERPL-MCNC: 3.4 MG/DL (ref 2.5–4.5)
PLATELET # BLD AUTO: 209 10E9/L (ref 150–450)
POTASSIUM SERPL-SCNC: 3.4 MMOL/L (ref 3.4–5.3)
PROT SERPL-MCNC: 6.4 G/DL (ref 6.8–8.8)
RBC # BLD AUTO: 3.8 10E12/L (ref 4.4–5.9)
SODIUM SERPL-SCNC: 143 MMOL/L (ref 133–144)
TRIGL SERPL-MCNC: 70 MG/DL
WBC # BLD AUTO: 5.5 10E9/L (ref 4–11)

## 2020-04-20 PROCEDURE — 84478 ASSAY OF TRIGLYCERIDES: CPT | Performed by: SURGERY

## 2020-04-20 PROCEDURE — 84100 ASSAY OF PHOSPHORUS: CPT | Performed by: SURGERY

## 2020-04-20 PROCEDURE — 83735 ASSAY OF MAGNESIUM: CPT | Performed by: SURGERY

## 2020-04-20 PROCEDURE — 80053 COMPREHEN METABOLIC PANEL: CPT | Performed by: SURGERY

## 2020-04-20 PROCEDURE — 85025 COMPLETE CBC W/AUTO DIFF WBC: CPT | Performed by: SURGERY

## 2020-04-22 ENCOUNTER — HOME INFUSION (PRE-WILLOW HOME INFUSION) (OUTPATIENT)
Dept: PHARMACY | Facility: CLINIC | Age: 50
End: 2020-04-22

## 2020-04-23 NOTE — PROGRESS NOTES
This is a recent snapshot of the patient's [facility] medical record.  For current drug dose and complete information and questions, call [phone] or In Basket pool, fv home infusion ([id number])  CSN Number:  [id number]

## 2020-04-24 ENCOUNTER — PATIENT OUTREACH (OUTPATIENT)
Dept: CARE COORDINATION | Facility: CLINIC | Age: 50
End: 2020-04-24

## 2020-04-24 ENCOUNTER — HOME INFUSION (PRE-WILLOW HOME INFUSION) (OUTPATIENT)
Dept: PHARMACY | Facility: CLINIC | Age: 50
End: 2020-04-24

## 2020-04-24 ASSESSMENT — ACTIVITIES OF DAILY LIVING (ADL): DEPENDENT_IADLS:: MEDICATION MANAGEMENT;TRANSPORTATION;SHOPPING

## 2020-04-24 NOTE — PROGRESS NOTES
Clinic Care Coordination Contact    Follow Up Progress Note      Assessment: RN CC spoke with patient's spouse Rose (consent to communicate on file)  for follow up.  Patient has a new Cm line that is working well.    Patient has not scheduled f/u with cardiology or bariatric surgery.  Rose states she attempted to send in pictures of patient's bile content, however, her files were too large to attach.  RN CC advised for Rose to check with Dr. Vyas's team regarding a Video Visit to discuss patient's care, as well as cardiology regarding his overdue f/u.  Rose agreeable to care.     Goals addressed this encounter:   Goals Addressed                 This Visit's Progress      #1 Medical (pt-stated)   Not on track     Goal Statement: I will see cardiology.  Measure of Success: Appointment will be scheduled and attended.  Supportive Steps to Achieve: Review of hospital discharge instructions.  Barriers: multiple specialists and appointments, covid-19 precautions  Strengths: care coordination, supportive spouse  Date to Achieve By: 6/31/20  Patient expressed understanding of goal: yes           #2 Medical (pt-stated)   Not on track     Goal Statement: I will see bariatric surgery as instructed.  Measure of Success: Appointment will be scheduled and attended, will try to send virtual visit request and attach pictures of G-tube bile content  Supportive Steps to Achieve: Review of hospital discharge instructions.  Barriers: multiple specialists, covid-19 precautions  Strengths: care coordination, supportive spouse  Date to Achieve By: 6/24/20  Patient expressed understanding of goal: yes                Intervention/Education provided during outreach: RN CC reviewed plan of care and continued to advise Rose to explore Virtual visits for patient.     Outreach Frequency: monthly    Plan:   1. Rose will explore Video visits for patient with cardiology and bariatric surgery appointments.  2. RN Care Coordinator  will follow up in 1 month.    Melissa Behl BSN, RN, PHN, CCM  Primary Care Clinical RN Care Coordinator  [facility]   [phone]

## 2020-04-24 NOTE — LETTER
[facility] CARE COORDINATION  [address] [facility] [address]   April 24, 2020        Parker Acevedo  [address]   [place] [address]          Dear Meir,     It was so nice to talk to you today!  Attached is an updated Complex Care Plan for your continued enrollment in Care Coordination. Please let us know if you have additional questions, concerns, or goals that we can assist with.    Sincerely,    Melissa Behl BSN, RN, PHN, CCM  Primary Care Clinical RN Care Coordinator  [facility]   [phone]                                        Formerly [facility]  Complex Care Plan  About Me:    Patient Name:  Parker Acevedo    YOB: 1970  Age:         49 year old   [place] MRN:    [id number] Telephone Information:  Home Phone [phone]   Mobile [phone]       Address:  [address]   [place] [address] Email address:  [email or web address]      Emergency Contact(s)    Name Relationship Lgl Grd Work Phone Home Phone Mobile Phone   1. BERTRAND RAMOS* Spouse No  [phone]   2. JEYSON CAIN * Relative No  [phone]   3. CHARLI ACEVEDO* Mother No  [phone]           Primary language:  English     needed? No   [facility] [employer]:  [phone] op. 1  Other communication barriers: Glasses, Physical impairment  Preferred Method of Communication:  Fritzt  Current living arrangement: I live in a private home with spouse  Mobility Status/ Medical Equipment: Independent    Health Maintenance  Health Maintenance Reviewed:    Health Maintenance Due   Topic Date Due     MEDICARE ANNUAL WELLNESS VISIT  06/21/2017     PHQ-2  01/01/2020        My Access Plan  Medical Emergency 911   Primary Clinic Line [facility] - [phone]   24 Hour Appointment Line [phone] or  [phone]) (toll-free)   75  Hour Nurse Line [phone] (toll-free)   Preferred Urgent Care Other   Preferred Hospital [facility] [place]  [phone]   Preferred Pharmacy [facility] - [address]     Behavioral Health Crisis Line The National Suicide Prevention Lifeline at 1-565.725.6756 or 911             My Care Team Members  Patient Care Team       Relationship Specialty Notifications Start End    Chuy Isaac MD PCP - General Internal Medicine  10/30/18     Phone: [phone] Fax: [phone]         [address] [place] [address]    Francis Vyas MD Referring Physician Surgery  4/9/15     GI     Phone: [phone] Fax: [phone]         [address] [facility] [address] [facility] [address]    Bill Brumfield MD MD Gastroenterology  6/2/15     Phone: [phone] Fax: [phone]         [address] [facility] [address] [facility] [address]    Patti Milligan MD MD Pain Clinic  6/2/15     Phone: [phone] Fax: [phone]         [address] [place] [address] [facility] [address]    Behl, Melissa K, RN Lead Care Coordinator Primary Care - CC Admissions 3/28/18     Phone: [phone] Fax: [phone]        Chuy Isaac MD Assigned PCP   11/11/18     Phone: [phone] Fax: [phone]         [address] [place] [address]    Marlyn Jnekins MD MD Ophthalmology  1/29/19     Phone: [phone] Fax: [phone]         [address] [facility] [address] [facility] [address]    Marlyn Jenkins MD MD Ophthalmology  2/11/19     Phone: [phone] Fax: [phone]         [address] [facility] [address] [facility] [address]            My Care Plans  Self Management and Treatment Plan  Goals and (Comments)  Goals        General    #1 Medical (pt-stated)     Notes - Note edited  3/24/2020  2:02 PM by Behl, Melissa K, RN    Goal Statement: I will see cardiology.  Measure of Success: Appointment will be scheduled and  attended.  Supportive Steps to Achieve: Review of hospital discharge instructions.  Barriers: multiple specialists and appointments, covid-19 precautions  Strengths: care coordination, supportive spouse  Date to Achieve By: 6/31/20  Patient expressed understanding of goal: yes         #2 Medical (pt-stated)     Notes - Note edited  4/24/2020  1:53 PM by Behl, Melissa K, RN    Goal Statement: I will see bariatric surgery as instructed.  Measure of Success: Appointment will be scheduled and attended, will try to send virtual visit request and attach pictures of G-tube bile content  Supportive Steps to Achieve: Review of hospital discharge instructions.  Barriers: multiple specialists, covid-19 precautions  Strengths: care coordination, supportive spouse  Date to Achieve By: 6/24/20  Patient expressed understanding of goal: yes                Action Plans on File:                       Advance Care Plans/Directives Type:   Type Advanced Care Plans/Directives: Advanced Directive - On File    My Medical and Care Information  Problem List   Patient Active Problem List   Diagnosis     Peptic ulcer disease     Gastric bypass status for obesity     Chronic abdominal pain     Vomiting     Anemia     ADHD (attention deficit hyperactivity disorder), inattentive type     Vitamin B12 deficiency without anemia     Thiamine deficiency     Dehydration     Dysphagia     Weight loss, non-intentional     Malnutrition (H)     Chronic anxiety     Constipation     Bile reflux esophagitis     Former smoker     Vitamin D deficiency     Iron deficiency     Health Care Home     Chronic pain     Insomnia     Positive blood culture     Fungemia     Chronic nausea     Gastrostomy tube in place (H)     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)     Short gut syndrome     Anxiety     Status post cervical spinal arthrodesis     Port or reservoir infection, initial encounter     Abnormal echocardiogram     Low serum iron     Anemia, iron deficiency      Catheter-related bloodstream infection (CRBSI)     Generalized weakness     S/P bariatric surgery     Hyperlipidemia LDL goal <130     Bacteremia     Infection by Candida species     PICC line infiltration, sequela     Gram-positive bacteremia     On total parenteral nutrition      Current Medications and Allergies:  See printed Medication Report.    Care Coordination Start Date: 1/10/2019   Frequency of Care Coordination: monthly   Form Last Updated: 04/24/2020

## 2020-04-25 ENCOUNTER — MYC REFILL (OUTPATIENT)
Dept: INTERNAL MEDICINE | Facility: CLINIC | Age: 50
End: 2020-04-25

## 2020-04-25 ENCOUNTER — MYC REFILL (OUTPATIENT)
Dept: FAMILY MEDICINE | Facility: CLINIC | Age: 50
End: 2020-04-25

## 2020-04-25 ENCOUNTER — MYC REFILL (OUTPATIENT)
Dept: PALLIATIVE MEDICINE | Facility: CLINIC | Age: 50
End: 2020-04-25

## 2020-04-25 DIAGNOSIS — F90.0 ADHD (ATTENTION DEFICIT HYPERACTIVITY DISORDER), INATTENTIVE TYPE: ICD-10-CM

## 2020-04-25 DIAGNOSIS — G89.29 CHRONIC ABDOMINAL PAIN: ICD-10-CM

## 2020-04-25 DIAGNOSIS — G89.4 CHRONIC PAIN SYNDROME: ICD-10-CM

## 2020-04-25 DIAGNOSIS — R10.9 CHRONIC ABDOMINAL PAIN: ICD-10-CM

## 2020-04-25 DIAGNOSIS — R60.0 BILATERAL EDEMA OF LOWER EXTREMITY: ICD-10-CM

## 2020-04-25 RX ORDER — FENTANYL 25 UG/1
1 PATCH TRANSDERMAL
Qty: 15 PATCH | Refills: 0 | Status: CANCELLED | OUTPATIENT
Start: 2020-04-25

## 2020-04-27 RX ORDER — FENTANYL 25 UG/1
1 PATCH TRANSDERMAL
Qty: 15 PATCH | Refills: 0 | Status: SHIPPED | OUTPATIENT
Start: 2020-04-27 | End: 2020-05-27

## 2020-04-27 RX ORDER — LORAZEPAM 1 MG/1
TABLET ORAL
Qty: 30 TABLET | Refills: 0 | Status: SHIPPED | OUTPATIENT
Start: 2020-04-27 | End: 2020-05-26

## 2020-04-27 RX ORDER — DEXTROAMPHETAMINE SACCHARATE, AMPHETAMINE ASPARTATE, DEXTROAMPHETAMINE SULFATE AND AMPHETAMINE SULFATE 5; 5; 5; 5 MG/1; MG/1; MG/1; MG/1
TABLET ORAL
Qty: 30 TABLET | Refills: 0 | Status: SHIPPED | OUTPATIENT
Start: 2020-04-27 | End: 2020-05-26

## 2020-04-27 NOTE — TELEPHONE ENCOUNTER
Received Mychart from patient requesting refill(s) of  fentaNYL (DURAGESIC) 25 mcg/hr 72 hr patch     Last picked up from pharmacy on 04/03/20    Pt last seen by prescribing provider on 03/23/20    Next appt scheduled for 06/22/20     checked in the past 6 months? Yes If no, print current report and give to RN    Last urine drug screen date 09/11/19  Current opioid agreement on file (completed within the last year) Yes Date of opioid agreement: 09/11/19    Processing (pick one and delete the others):      E-prescribe to     [facility] [place], [place] - [address] [place] [address]  [address] [facility] [address]  Phone: [phone] Fax: [phone]    Will route to nursing pool for review and preparation of prescription(s).    Sahara Farrell [facility]  [place]

## 2020-04-27 NOTE — TELEPHONE ENCOUNTER
Please advise in absence of PCP.     Adderall 20 mg      Last Written Prescription Date:  3/24/2020  Last Fill Quantity: 30,   # refills: 0  Last Office Visit: 3/24/20  Future Office visit:    Next 5 appointments (look out 90 days)    Jun 22, 2020 11:30 AM CDT  Telephone Visit with Patti Milligan MD  [facility] ([facility]) [address] [facility]  [place] [address]  [phone]           Routing refill request to provider for review/approval because:  Drug not on the [facility], [facility] or [facility] refill protocol or controlled substance    Lorazepam 1 mg      Last Written Prescription Date:  3/24/20  Last Fill Quantity: 30,   # refills: 0  Last Office Visit: 3/24/20  Future Office visit:    Next 5 appointments (look out 90 days)    Jun 22, 2020 11:30 AM CDT  Telephone Visit with Patti Milligan MD  [facility] [place] ([facility]) [address] [facility]  [place] [address]  [phone]           Routing refill request to provider for review/approval because:  Drug not on the [facility], [facility] or [facility] refill protocol or controlled substance

## 2020-04-27 NOTE — PROGRESS NOTES
This is a recent snapshot of the patient's [facility] medical record.  For current drug dose and complete information and questions, call [phone] or In Basket pool, fv home infusion ([id number])  CSN Number:  [id number]

## 2020-04-27 NOTE — TELEPHONE ENCOUNTER
Pending Prescriptions:                       Disp   Refills    order for DME                              4 each 5        Sig: Equipment being ordered: Bilateral knee high chronic           venous insufficiency stockings--  mild-moderate           pressures.      Routing refill request to provider for review/approval because:  Drug not on the [facility] refill protocol     Nafisa Aponte, MSN, RN

## 2020-04-27 NOTE — TELEPHONE ENCOUNTER
Medication refill information reviewed.     Due date for fentaNYL (DURAGESIC) 25 mcg/hr 72 hr patch is 5/5/2020     Prescriptions prepped for review.     Will route to provider.     Skinny Kim, RN  Care Coordinator   [facility] [place]

## 2020-04-28 ENCOUNTER — HOSPITAL ENCOUNTER (OUTPATIENT)
Dept: LAB | Facility: CLINIC | Age: 50
Discharge: HOME | End: 2020-04-28
Attending: SURGERY | Admitting: SURGERY
Payer: COMMERCIAL

## 2020-04-28 LAB
ALBUMIN SERPL-MCNC: 3.4 G/DL (ref 3.4–5)
ALP SERPL-CCNC: 77 U/L (ref 40–150)
ALT SERPL W P-5'-P-CCNC: 29 U/L (ref 0–70)
ANION GAP SERPL CALCULATED.3IONS-SCNC: 7 MMOL/L (ref 3–14)
AST SERPL W P-5'-P-CCNC: 16 U/L (ref 0–45)
BASOPHILS # BLD AUTO: 0.1 10E9/L (ref 0–0.2)
BASOPHILS NFR BLD AUTO: 0.5 %
BILIRUB DIRECT SERPL-MCNC: 0.1 MG/DL (ref 0–0.2)
BILIRUB SERPL-MCNC: 0.5 MG/DL (ref 0.2–1.3)
BUN SERPL-MCNC: 12 MG/DL (ref 7–30)
CALCIUM SERPL-MCNC: 7.7 MG/DL (ref 8.5–10.1)
CHLORIDE SERPL-SCNC: 107 MMOL/L (ref 94–109)
CO2 SERPL-SCNC: 25 MMOL/L (ref 20–32)
CREAT SERPL-MCNC: 0.65 MG/DL (ref 0.66–1.25)
CRP SERPL-MCNC: <2.9 MG/L (ref 0–8)
DIFFERENTIAL METHOD BLD: ABNORMAL
EOSINOPHIL NFR BLD AUTO: 1.1 %
ERYTHROCYTE [DISTWIDTH] IN BLOOD BY AUTOMATED COUNT: 15 % (ref 10–15)
FERRITIN SERPL-MCNC: 14 NG/ML (ref 26–388)
GFR SERPL CREATININE-BSD FRML MDRD: >90 ML/MIN/{1.73_M2}
GLUCOSE SERPL-MCNC: 103 MG/DL (ref 70–99)
HCT VFR BLD AUTO: 41 % (ref 40–53)
HGB BLD-MCNC: 13.2 G/DL (ref 13.3–17.7)
IMM GRANULOCYTES # BLD: 0.1 10E9/L (ref 0–0.4)
IMM GRANULOCYTES NFR BLD: 0.5 %
LYMPHOCYTES # BLD AUTO: 2.6 10E9/L (ref 0.8–5.3)
LYMPHOCYTES NFR BLD AUTO: 27.9 %
MAGNESIUM SERPL-MCNC: 2.1 MG/DL (ref 1.6–2.3)
MCH RBC QN AUTO: 29.9 PG (ref 26.5–33)
MCHC RBC AUTO-ENTMCNC: 32.2 G/DL (ref 31.5–36.5)
MCV RBC AUTO: 93 FL (ref 78–100)
MONOCYTES # BLD AUTO: 0.8 10E9/L (ref 0–1.3)
MONOCYTES NFR BLD AUTO: 8.8 %
NEUTROPHILS # BLD AUTO: 5.7 10E9/L (ref 1.6–8.3)
NEUTROPHILS NFR BLD AUTO: 61.2 %
NRBC # BLD AUTO: 0 10*3/UL
NRBC BLD AUTO-RTO: 0 /100
PHOSPHATE SERPL-MCNC: 3.1 MG/DL (ref 2.5–4.5)
PLATELET # BLD AUTO: 210 10E9/L (ref 150–450)
POTASSIUM SERPL-SCNC: 3.7 MMOL/L (ref 3.4–5.3)
PROT SERPL-MCNC: 7.3 G/DL (ref 6.8–8.8)
RBC # BLD AUTO: 4.42 10E12/L (ref 4.4–5.9)
SODIUM SERPL-SCNC: 139 MMOL/L (ref 133–144)
TRIGL SERPL-MCNC: 109 MG/DL
WBC # BLD AUTO: 9.3 10E9/L (ref 4–11)

## 2020-04-28 PROCEDURE — 84478 ASSAY OF TRIGLYCERIDES: CPT | Performed by: SURGERY

## 2020-04-28 PROCEDURE — 85025 COMPLETE CBC W/AUTO DIFF WBC: CPT | Performed by: SURGERY

## 2020-04-28 PROCEDURE — 82248 BILIRUBIN DIRECT: CPT | Performed by: SURGERY

## 2020-04-28 PROCEDURE — 82728 ASSAY OF FERRITIN: CPT | Performed by: SURGERY

## 2020-04-28 PROCEDURE — 86140 C-REACTIVE PROTEIN: CPT | Performed by: SURGERY

## 2020-04-28 PROCEDURE — 84100 ASSAY OF PHOSPHORUS: CPT | Performed by: SURGERY

## 2020-04-28 PROCEDURE — 83735 ASSAY OF MAGNESIUM: CPT | Performed by: SURGERY

## 2020-04-28 PROCEDURE — 82525 ASSAY OF COPPER: CPT | Performed by: SURGERY

## 2020-04-28 PROCEDURE — 84255 ASSAY OF SELENIUM: CPT | Performed by: SURGERY

## 2020-04-28 PROCEDURE — 84630 ASSAY OF ZINC: CPT | Performed by: SURGERY

## 2020-04-28 PROCEDURE — 80053 COMPREHEN METABOLIC PANEL: CPT | Performed by: SURGERY

## 2020-04-28 NOTE — TELEPHONE ENCOUNTER
Defer to primary care provider due to complex history. It's been 4 years since this was prescribed.

## 2020-04-29 ENCOUNTER — HOME INFUSION (PRE-WILLOW HOME INFUSION) (OUTPATIENT)
Dept: PHARMACY | Facility: CLINIC | Age: 50
End: 2020-04-29

## 2020-04-29 ENCOUNTER — DOCUMENTATION ONLY (OUTPATIENT)
Dept: PHARMACY | Facility: CLINIC | Age: 50
End: 2020-04-29

## 2020-04-29 NOTE — PROGRESS NOTES
"Patient with long history of TPN in the home and questionable compliance with infusions.     Patient has long history of reporting partial infusions of TPN due to \"not tolerating\" the infusion.  Reports he can only tolerate part of the infusion because he becomes bloated, nauseated, and vomits bile.      On 3/18/20 patient reported to pharmacist he was only completing 5-6 hrs on average of his TPN infusion because this was all he could tolerate.  Thus, [facility] adjusted his formula from AA 120gm, dex 200gm, Lipid 20% 250 mL, volume 2000 mL over 14 hrs to be  AA 60gm, dex 100gm, lipid 20% 125 mL volume 1000 mL over 7 hours.      Patient was active on this formula until his PICC line was removed 4/3/20.    Review of his infusion pump from this period revealed that during the 13 day period (3/21-4/3/20) he attempted a TPN infusion only 3 times.  3/23 - 30 min infused (16 mL), 3/29 - 2 hrs infused (300 mL), 3/30 - 3.5 hrs infused (500 mL).  Thus patient infused ~816 mL of TPN, ~6% of his prescribed volume for this time period.      New IV access placed 4/14/20 and patient restarted on TPN 4/16/20 with a new infusion pump sent to the home.  RN reviewed \"mL infused\" report at [facility] 4/28/20.  From 4/16-4/28 patient has infused a total of 2989.4 mL of the 12,000 mL ordered for that timeframe (~25%).  RN also reviewed patient's pump used for infusing IV hydration fluids.  A total of 43,371 mL infused (~43 1L bags) since 11/22/19.  In this timeframe, [facility] has provided patient with 112 bags of IVF, of which patient has infused ~38% per pump report.      When I spoke to his spouse last week she denies that he missed any infusions, or turned the pump off early.  This week she said that it takes him a while to get used to being on the TPN again, that sometimes will have to pause infusions for a few hours and then resume.     Patient also has a significant history of having his central lines either \"fall out\" or become infected.  He is on " his 8th central line since January of 2019.  He has had periods of time, up to a few weeks at a time, without central line access in which he infuses IV hydration through a PIV placed at his home.      Patient's labs and weight have been largely stable given his variable infusion compliance. Weight reported by patient or his spouse generally 174-180 lbs.     Will trial adding famotidine 20 mg to TPN to see if that helps with reported symptoms during infusion.     hospitals Nutrition Support Team will continue to monitor infusion compliance with RN reviewing pump reports at weekly visits for labs and assessment.      Jyothi Peraza, PharmD [facility]   Ph: [phone]  Fax: [phone]    .

## 2020-04-30 ENCOUNTER — HOME INFUSION (PRE-WILLOW HOME INFUSION) (OUTPATIENT)
Dept: PHARMACY | Facility: CLINIC | Age: 50
End: 2020-04-30

## 2020-05-01 LAB
COPPER SERPL-MCNC: 123.9 UG/DL (ref 70–140)
SELENIUM SERPL-MCNC: 141.6 UG/L (ref 23–190)
ZINC SERPL-MCNC: 78.9 UG/DL (ref 60–120)

## 2020-05-01 NOTE — PROGRESS NOTES
This is a recent snapshot of the patient's [facility] medical record.  For current drug dose and complete information and questions, call [phone] or In Basket pool, fv home infusion ([id number])  CSN Number:  [id number]

## 2020-05-04 ENCOUNTER — MYC REFILL (OUTPATIENT)
Dept: INTERNAL MEDICINE | Facility: CLINIC | Age: 50
End: 2020-05-04

## 2020-05-04 ENCOUNTER — MYC REFILL (OUTPATIENT)
Dept: PALLIATIVE MEDICINE | Facility: CLINIC | Age: 50
End: 2020-05-04

## 2020-05-04 DIAGNOSIS — G89.29 CHRONIC ABDOMINAL PAIN: ICD-10-CM

## 2020-05-04 DIAGNOSIS — Z98.84 GASTRIC BYPASS STATUS FOR OBESITY: ICD-10-CM

## 2020-05-04 DIAGNOSIS — R11.0 NAUSEA: ICD-10-CM

## 2020-05-04 DIAGNOSIS — R10.9 CHRONIC ABDOMINAL PAIN: ICD-10-CM

## 2020-05-04 RX ORDER — OXYCODONE HCL 5 MG/5 ML
5-10 SOLUTION, ORAL ORAL 2 TIMES DAILY PRN
Qty: 600 ML | Refills: 0 | Status: SHIPPED | OUTPATIENT
Start: 2020-05-04 | End: 2020-06-02

## 2020-05-04 RX ORDER — OXYCODONE HCL 5 MG/5 ML
5-10 SOLUTION, ORAL ORAL 2 TIMES DAILY PRN
Qty: 600 ML | Refills: 0 | Status: CANCELLED | OUTPATIENT
Start: 2020-05-04

## 2020-05-04 NOTE — TELEPHONE ENCOUNTER
Script Eprescribed to pharmacy    Will send this to MA team to notify patient.    Signed Prescriptions:                        Disp   Refills    oxyCODONE (ROXICODONE) 5 MG/5ML solution   600 mL 0        Sig: Take 5-10 mLs (5-10 mg) by mouth 2 times daily as           needed for pain Max of 20mg/day. 30 day supply.           May fill on 5/10/20 to start on 5/12/20  Authorizing Provider: BRANDYN JENKINS MD  [facility]

## 2020-05-04 NOTE — TELEPHONE ENCOUNTER
Stoney message from patient on 5/4 at 1222:    Parker Acevedo would like a refill of the following medications:         oxyCODONE (ROXICODONE) 5 MG/5ML solution [Patti Milligan MD]     Preferred pharmacy: [facility] - [address]   ---------    Will route to MA pool for assistance with gathering opioid refill information.    ERIN Crook, RN-BC  Patient Care Supervisor/Care Coordinator  [facility]

## 2020-05-04 NOTE — TELEPHONE ENCOUNTER
Patient requesting refill(s) of oxyCODONE (ROXICODONE) 5 MG/5ML solution   Last dispensed from pharmacy on 4/10/2020    Pt last seen by prescribing provider on 3/23/2020  Next appt scheduled for 6/22/2020     checked in the past 6 months? Yes If no, print current report and give to RN    Last urine drug screen date 9/11/2019  Current opioid agreement on file (completed within the last year) Yes Date of opioid agreement: 9/11/2019    Processing (pick one and delete the others):      E-prescribe to [facility] [place] - [place], [place]  pharmacy    Will route to nursing pool for review and preparation of prescription(s).

## 2020-05-04 NOTE — TELEPHONE ENCOUNTER
Medication refill information reviewed.     Due date for oxyCODONE (ROXICODONE) 5 MG/5ML solution  is 5/12/20     Prescriptions prepped for review.     Will route to provider.

## 2020-05-05 ENCOUNTER — HOSPITAL ENCOUNTER (OUTPATIENT)
Dept: LAB | Facility: CLINIC | Age: 50
Discharge: HOME | End: 2020-05-05
Attending: SURGERY | Admitting: SURGERY
Payer: COMMERCIAL

## 2020-05-05 ENCOUNTER — MEDICAL CORRESPONDENCE (OUTPATIENT)
Dept: HEALTH INFORMATION MANAGEMENT | Facility: CLINIC | Age: 50
End: 2020-05-05

## 2020-05-05 LAB
ALBUMIN SERPL-MCNC: 3.4 G/DL (ref 3.4–5)
ALP SERPL-CCNC: 74 U/L (ref 40–150)
ALT SERPL W P-5'-P-CCNC: 18 U/L (ref 0–70)
ANION GAP SERPL CALCULATED.3IONS-SCNC: 4 MMOL/L (ref 3–14)
AST SERPL W P-5'-P-CCNC: 11 U/L (ref 0–45)
BASOPHILS # BLD AUTO: 0.1 10E9/L (ref 0–0.2)
BASOPHILS NFR BLD AUTO: 0.5 %
BILIRUB DIRECT SERPL-MCNC: 0.2 MG/DL (ref 0–0.2)
BILIRUB SERPL-MCNC: 0.7 MG/DL (ref 0.2–1.3)
BUN SERPL-MCNC: 24 MG/DL (ref 7–30)
CALCIUM SERPL-MCNC: 8 MG/DL (ref 8.5–10.1)
CHLORIDE SERPL-SCNC: 110 MMOL/L (ref 94–109)
CO2 SERPL-SCNC: 28 MMOL/L (ref 20–32)
CREAT SERPL-MCNC: 0.64 MG/DL (ref 0.66–1.25)
CRP SERPL-MCNC: <2.9 MG/L (ref 0–8)
DIFFERENTIAL METHOD BLD: ABNORMAL
EOSINOPHIL NFR BLD AUTO: 2.5 %
ERYTHROCYTE [DISTWIDTH] IN BLOOD BY AUTOMATED COUNT: 15.5 % (ref 10–15)
FERRITIN SERPL-MCNC: 11 NG/ML (ref 26–388)
GFR SERPL CREATININE-BSD FRML MDRD: >90 ML/MIN/{1.73_M2}
GLUCOSE SERPL-MCNC: 91 MG/DL (ref 70–99)
HCT VFR BLD AUTO: 36.3 % (ref 40–53)
HGB BLD-MCNC: 11.7 G/DL (ref 13.3–17.7)
IMM GRANULOCYTES # BLD: 0 10E9/L (ref 0–0.4)
IMM GRANULOCYTES NFR BLD: 0.3 %
LYMPHOCYTES # BLD AUTO: 2.5 10E9/L (ref 0.8–5.3)
LYMPHOCYTES NFR BLD AUTO: 26.1 %
MAGNESIUM SERPL-MCNC: 2.2 MG/DL (ref 1.6–2.3)
MCH RBC QN AUTO: 29.9 PG (ref 26.5–33)
MCHC RBC AUTO-ENTMCNC: 32.2 G/DL (ref 31.5–36.5)
MCV RBC AUTO: 93 FL (ref 78–100)
MONOCYTES # BLD AUTO: 1.4 10E9/L (ref 0–1.3)
MONOCYTES NFR BLD AUTO: 15 %
NEUTROPHILS # BLD AUTO: 5.3 10E9/L (ref 1.6–8.3)
NEUTROPHILS NFR BLD AUTO: 55.6 %
NRBC # BLD AUTO: 0 10*3/UL
NRBC BLD AUTO-RTO: 0 /100
PHOSPHATE SERPL-MCNC: 3.9 MG/DL (ref 2.5–4.5)
PLATELET # BLD AUTO: 199 10E9/L (ref 150–450)
POTASSIUM SERPL-SCNC: 3.3 MMOL/L (ref 3.4–5.3)
PROT SERPL-MCNC: 7.1 G/DL (ref 6.8–8.8)
RBC # BLD AUTO: 3.91 10E12/L (ref 4.4–5.9)
SODIUM SERPL-SCNC: 142 MMOL/L (ref 133–144)
TRIGL SERPL-MCNC: 186 MG/DL
WBC # BLD AUTO: 9.6 10E9/L (ref 4–11)

## 2020-05-05 PROCEDURE — 84630 ASSAY OF ZINC: CPT | Performed by: SURGERY

## 2020-05-05 PROCEDURE — 83785 ASSAY OF MANGANESE: CPT | Performed by: SURGERY

## 2020-05-05 PROCEDURE — 84478 ASSAY OF TRIGLYCERIDES: CPT | Performed by: SURGERY

## 2020-05-05 PROCEDURE — 83735 ASSAY OF MAGNESIUM: CPT | Performed by: SURGERY

## 2020-05-05 PROCEDURE — 82525 ASSAY OF COPPER: CPT | Performed by: SURGERY

## 2020-05-05 PROCEDURE — 80053 COMPREHEN METABOLIC PANEL: CPT | Performed by: SURGERY

## 2020-05-05 PROCEDURE — 82728 ASSAY OF FERRITIN: CPT | Performed by: SURGERY

## 2020-05-05 PROCEDURE — 82248 BILIRUBIN DIRECT: CPT | Performed by: SURGERY

## 2020-05-05 PROCEDURE — 86140 C-REACTIVE PROTEIN: CPT | Performed by: SURGERY

## 2020-05-05 PROCEDURE — 85025 COMPLETE CBC W/AUTO DIFF WBC: CPT | Performed by: SURGERY

## 2020-05-05 PROCEDURE — 84100 ASSAY OF PHOSPHORUS: CPT | Performed by: SURGERY

## 2020-05-05 PROCEDURE — 84255 ASSAY OF SELENIUM: CPT | Performed by: SURGERY

## 2020-05-06 ENCOUNTER — HOME INFUSION (PRE-WILLOW HOME INFUSION) (OUTPATIENT)
Dept: PHARMACY | Facility: CLINIC | Age: 50
End: 2020-05-06

## 2020-05-06 RX ORDER — ONDANSETRON 8 MG/1
TABLET, ORALLY DISINTEGRATING ORAL
Qty: 90 TABLET | Refills: 1 | Status: SHIPPED | OUTPATIENT
Start: 2020-05-06 | End: 2020-06-02

## 2020-05-06 NOTE — TELEPHONE ENCOUNTER
Zofran  Last Written Prescription Date:  1/2/20  Last Fill Quantity: 90,  # refills: 1   Last office visit: 11/25/2019 with prescribing provider:  3/26/20 with PERNELL Mri MD    Future Office Visit:   Next 5 appointments (look out 90 days)    Jun 22, 2020 11:30 AM CDT  Telephone Visit with Patti Milligan MD  [facility] ([facility]) [address] [facility] [address]  [phone]         Lidocaine cocktail  Last Written Prescription Date:  3/4/20  Last Fill Quantity: 1 bottle,  # refills: 1   Last office visit: 11/25/2019 with prescribing provider:  3/26/20 Dr. Mir   Future Office Visit:   Next 5 appointments (look out 90 days)    Jun 22, 2020 11:30 AM CDT  Telephone Visit with Patti iMlligan MD  [facility] ([facility]) [address] [facility] [address]  [phone]         Routing refill request to provider for review/approval because:  Drug not on the [facility] refill protocol   A break in medication  CHRISTY Husrt                       Yes

## 2020-05-08 ENCOUNTER — HOME INFUSION (PRE-WILLOW HOME INFUSION) (OUTPATIENT)
Dept: PHARMACY | Facility: CLINIC | Age: 50
End: 2020-05-08

## 2020-05-08 LAB
COPPER SERPL-MCNC: 114.2 UG/DL (ref 70–140)
MANGANESE BLD-MCNC: 10.3 UG/L (ref 4.2–16.5)
SELENIUM SERPL-MCNC: 147.6 UG/L (ref 23–190)
ZINC SERPL-MCNC: 67.8 UG/DL (ref 60–120)

## 2020-05-11 NOTE — PROGRESS NOTES
This is a recent snapshot of the patient's [facility] medical record.  For current drug dose and complete information and questions, call [phone] or In Basket pool, fv home infusion ([id number])  CSN Number:  [id number]

## 2020-05-12 ENCOUNTER — HOSPITAL ENCOUNTER (OUTPATIENT)
Dept: LAB | Facility: CLINIC | Age: 50
Discharge: HOME | End: 2020-05-12
Attending: SURGERY | Admitting: SURGERY
Payer: COMMERCIAL

## 2020-05-12 ENCOUNTER — HOME INFUSION (PRE-WILLOW HOME INFUSION) (OUTPATIENT)
Dept: PHARMACY | Facility: CLINIC | Age: 50
End: 2020-05-12

## 2020-05-12 LAB
ALBUMIN SERPL-MCNC: 3 G/DL (ref 3.4–5)
ALP SERPL-CCNC: 68 U/L (ref 40–150)
ALT SERPL W P-5'-P-CCNC: 25 U/L (ref 0–70)
ANION GAP SERPL CALCULATED.3IONS-SCNC: 1 MMOL/L (ref 3–14)
AST SERPL W P-5'-P-CCNC: 23 U/L (ref 0–45)
BASOPHILS # BLD AUTO: 0 10E9/L (ref 0–0.2)
BASOPHILS NFR BLD AUTO: 0.6 %
BILIRUB DIRECT SERPL-MCNC: <0.1 MG/DL (ref 0–0.2)
BILIRUB SERPL-MCNC: 0.2 MG/DL (ref 0.2–1.3)
BUN SERPL-MCNC: 13 MG/DL (ref 7–30)
CALCIUM SERPL-MCNC: 7.6 MG/DL (ref 8.5–10.1)
CHLORIDE SERPL-SCNC: 111 MMOL/L (ref 94–109)
CO2 SERPL-SCNC: 31 MMOL/L (ref 20–32)
CREAT SERPL-MCNC: 0.74 MG/DL (ref 0.66–1.25)
DIFFERENTIAL METHOD BLD: ABNORMAL
EOSINOPHIL NFR BLD AUTO: 4 %
ERYTHROCYTE [DISTWIDTH] IN BLOOD BY AUTOMATED COUNT: 15.3 % (ref 10–15)
GFR SERPL CREATININE-BSD FRML MDRD: >90 ML/MIN/{1.73_M2}
GLUCOSE SERPL-MCNC: 76 MG/DL (ref 70–99)
HCT VFR BLD AUTO: 33.2 % (ref 40–53)
HGB BLD-MCNC: 10.4 G/DL (ref 13.3–17.7)
IMM GRANULOCYTES # BLD: 0 10E9/L (ref 0–0.4)
IMM GRANULOCYTES NFR BLD: 0.3 %
LYMPHOCYTES # BLD AUTO: 1.5 10E9/L (ref 0.8–5.3)
LYMPHOCYTES NFR BLD AUTO: 20.6 %
MAGNESIUM SERPL-MCNC: 2 MG/DL (ref 1.6–2.3)
MCH RBC QN AUTO: 30.1 PG (ref 26.5–33)
MCHC RBC AUTO-ENTMCNC: 31.3 G/DL (ref 31.5–36.5)
MCV RBC AUTO: 96 FL (ref 78–100)
MONOCYTES # BLD AUTO: 1.2 10E9/L (ref 0–1.3)
MONOCYTES NFR BLD AUTO: 17.1 %
NEUTROPHILS # BLD AUTO: 4.1 10E9/L (ref 1.6–8.3)
NEUTROPHILS NFR BLD AUTO: 57.4 %
NRBC # BLD AUTO: 0 10*3/UL
NRBC BLD AUTO-RTO: 0 /100
PHOSPHATE SERPL-MCNC: 3.4 MG/DL (ref 2.5–4.5)
PLATELET # BLD AUTO: 166 10E9/L (ref 150–450)
POTASSIUM SERPL-SCNC: 3.9 MMOL/L (ref 3.4–5.3)
PROT SERPL-MCNC: 6.7 G/DL (ref 6.8–8.8)
RBC # BLD AUTO: 3.46 10E12/L (ref 4.4–5.9)
SODIUM SERPL-SCNC: 143 MMOL/L (ref 133–144)
TRIGL SERPL-MCNC: 49 MG/DL
WBC # BLD AUTO: 7.2 10E9/L (ref 4–11)

## 2020-05-12 PROCEDURE — 82248 BILIRUBIN DIRECT: CPT | Performed by: SURGERY

## 2020-05-12 PROCEDURE — 80053 COMPREHEN METABOLIC PANEL: CPT | Performed by: SURGERY

## 2020-05-12 PROCEDURE — 85025 COMPLETE CBC W/AUTO DIFF WBC: CPT | Performed by: SURGERY

## 2020-05-12 PROCEDURE — 84478 ASSAY OF TRIGLYCERIDES: CPT | Performed by: SURGERY

## 2020-05-12 PROCEDURE — 83735 ASSAY OF MAGNESIUM: CPT | Performed by: SURGERY

## 2020-05-12 PROCEDURE — 84100 ASSAY OF PHOSPHORUS: CPT | Performed by: SURGERY

## 2020-05-13 ENCOUNTER — HOME INFUSION (PRE-WILLOW HOME INFUSION) (OUTPATIENT)
Dept: PHARMACY | Facility: CLINIC | Age: 50
End: 2020-05-13

## 2020-05-13 NOTE — PROGRESS NOTES
This is a recent snapshot of the patient's [facility] medical record.  For current drug dose and complete information and questions, call [phone] or In Basket pool, fv home infusion ([id number])  CSN Number:  [id number]

## 2020-05-14 NOTE — PROGRESS NOTES
This is a recent snapshot of the patient's [facility] medical record.  For current drug dose and complete information and questions, call [phone] or In [facility] pool, fv home infusion ([id number])  CSN Number:  [id number]

## 2020-05-19 ENCOUNTER — HOSPITAL ENCOUNTER (OUTPATIENT)
Dept: LAB | Facility: CLINIC | Age: 50
Discharge: HOME | End: 2020-05-19
Attending: SURGERY | Admitting: SURGERY
Payer: COMMERCIAL

## 2020-05-19 ENCOUNTER — HOME INFUSION (PRE-WILLOW HOME INFUSION) (OUTPATIENT)
Dept: PHARMACY | Facility: CLINIC | Age: 50
End: 2020-05-19

## 2020-05-19 ENCOUNTER — MEDICAL CORRESPONDENCE (OUTPATIENT)
Dept: HEALTH INFORMATION MANAGEMENT | Facility: CLINIC | Age: 50
End: 2020-05-19

## 2020-05-19 LAB
ALBUMIN SERPL-MCNC: 3.3 G/DL (ref 3.4–5)
ALP SERPL-CCNC: 70 U/L (ref 40–150)
ALT SERPL W P-5'-P-CCNC: 22 U/L (ref 0–70)
ANION GAP SERPL CALCULATED.3IONS-SCNC: 4 MMOL/L (ref 3–14)
AST SERPL W P-5'-P-CCNC: 15 U/L (ref 0–45)
BASOPHILS # BLD AUTO: 0.1 10E9/L (ref 0–0.2)
BASOPHILS NFR BLD AUTO: 0.7 %
BILIRUB DIRECT SERPL-MCNC: <0.1 MG/DL (ref 0–0.2)
BILIRUB SERPL-MCNC: 0.3 MG/DL (ref 0.2–1.3)
BUN SERPL-MCNC: 17 MG/DL (ref 7–30)
CALCIUM SERPL-MCNC: 7.8 MG/DL (ref 8.5–10.1)
CHLORIDE SERPL-SCNC: 110 MMOL/L (ref 94–109)
CO2 SERPL-SCNC: 29 MMOL/L (ref 20–32)
CREAT SERPL-MCNC: 0.69 MG/DL (ref 0.66–1.25)
DIFFERENTIAL METHOD BLD: ABNORMAL
EOSINOPHIL NFR BLD AUTO: 3.2 %
ERYTHROCYTE [DISTWIDTH] IN BLOOD BY AUTOMATED COUNT: 14.6 % (ref 10–15)
GFR SERPL CREATININE-BSD FRML MDRD: >90 ML/MIN/{1.73_M2}
GLUCOSE SERPL-MCNC: 76 MG/DL (ref 70–99)
HCT VFR BLD AUTO: 36 % (ref 40–53)
HGB BLD-MCNC: 11.5 G/DL (ref 13.3–17.7)
IMM GRANULOCYTES # BLD: 0 10E9/L (ref 0–0.4)
IMM GRANULOCYTES NFR BLD: 0.1 %
LYMPHOCYTES # BLD AUTO: 1.7 10E9/L (ref 0.8–5.3)
LYMPHOCYTES NFR BLD AUTO: 23.7 %
MAGNESIUM SERPL-MCNC: 2.2 MG/DL (ref 1.6–2.3)
MCH RBC QN AUTO: 29.9 PG (ref 26.5–33)
MCHC RBC AUTO-ENTMCNC: 31.9 G/DL (ref 31.5–36.5)
MCV RBC AUTO: 94 FL (ref 78–100)
MONOCYTES # BLD AUTO: 0.9 10E9/L (ref 0–1.3)
MONOCYTES NFR BLD AUTO: 12.8 %
NEUTROPHILS # BLD AUTO: 4.2 10E9/L (ref 1.6–8.3)
NEUTROPHILS NFR BLD AUTO: 59.5 %
NRBC # BLD AUTO: 0 10*3/UL
NRBC BLD AUTO-RTO: 0 /100
PHOSPHATE SERPL-MCNC: 3.8 MG/DL (ref 2.5–4.5)
PLATELET # BLD AUTO: 187 10E9/L (ref 150–450)
POTASSIUM SERPL-SCNC: 3.8 MMOL/L (ref 3.4–5.3)
PROT SERPL-MCNC: 7.1 G/DL (ref 6.8–8.8)
RBC # BLD AUTO: 3.84 10E12/L (ref 4.4–5.9)
SODIUM SERPL-SCNC: 143 MMOL/L (ref 133–144)
TRIGL SERPL-MCNC: 91 MG/DL
WBC # BLD AUTO: 7.1 10E9/L (ref 4–11)

## 2020-05-19 PROCEDURE — 84100 ASSAY OF PHOSPHORUS: CPT | Performed by: SURGERY

## 2020-05-19 PROCEDURE — 82248 BILIRUBIN DIRECT: CPT | Performed by: SURGERY

## 2020-05-19 PROCEDURE — 84478 ASSAY OF TRIGLYCERIDES: CPT | Performed by: SURGERY

## 2020-05-19 PROCEDURE — 80053 COMPREHEN METABOLIC PANEL: CPT | Performed by: SURGERY

## 2020-05-19 PROCEDURE — 83735 ASSAY OF MAGNESIUM: CPT | Performed by: SURGERY

## 2020-05-19 PROCEDURE — 85025 COMPLETE CBC W/AUTO DIFF WBC: CPT | Performed by: SURGERY

## 2020-05-20 ENCOUNTER — HOME INFUSION (PRE-WILLOW HOME INFUSION) (OUTPATIENT)
Dept: PHARMACY | Facility: CLINIC | Age: 50
End: 2020-05-20

## 2020-05-20 NOTE — PROGRESS NOTES
This is a recent snapshot of the patient's [facility] medical record.  For current drug dose and complete information and questions, call [phone] or In Basket pool, fv home infusion ([id number])  CSN Number:  [id number]

## 2020-05-21 NOTE — PROGRESS NOTES
This is a recent snapshot of the patient's [facility] medical record.  For current drug dose and complete information and questions, call [phone] or In Basket pool, fv home infusion ([id number])  CSN Number:  [id number]

## 2020-05-22 ENCOUNTER — PATIENT OUTREACH (OUTPATIENT)
Dept: CARE COORDINATION | Facility: CLINIC | Age: 50
End: 2020-05-22

## 2020-05-22 ASSESSMENT — ACTIVITIES OF DAILY LIVING (ADL): DEPENDENT_IADLS:: MEDICATION MANAGEMENT;TRANSPORTATION;SHOPPING

## 2020-05-22 NOTE — PROGRESS NOTES
Clinic Care Coordination Contact    Follow Up Progress Note      Assessment: RN CC spoke with patient and spouse Rose (consent to communicate on file) for follow up.  Rose stated she was working on figuring out sending virtual visit on the G-tube/bile content to Dr. Vyas, but notes the bile is not as much.   A follow up appointment has not been scheduled with cardiology or bariatric surgery.  Patient is doing well.  He remains afebrile, tolerating TPN with his line and chronic pain is currently 5/10 (was 9/10 prior to pain medication).  No other questions or concerns at this time.    Goals addressed this encounter:   Goals Addressed                 This Visit's Progress      #1 Medical (pt-stated)   Not on track     Goal Statement: I will see cardiology.  Measure of Success: Appointment will be scheduled and attended, will check with cardiology regarding scheduling a virtual visit.  Supportive Steps to Achieve: Review of hospital discharge instructions.  Barriers: multiple specialists and appointments, covid-19 precautions  Strengths: care coordination, supportive spouse  Date to Achieve By: 6/30/20  Patient expressed understanding of goal: yes           #2 Medical (pt-stated)   Not on track     Goal Statement: I will see bariatric surgery as instructed.  Measure of Success: Appointment will be scheduled and attended, will try to send virtual visit request and attach pictures of G-tube bile content  Supportive Steps to Achieve: Review of hospital discharge instructions.  Barriers: multiple specialists, covid-19 precautions  Strengths: care coordination, supportive spouse  Date to Achieve By: 6/30/20  Patient expressed understanding of goal: yes                Intervention/Education provided during outreach: RN CC reviewed how to send MyChart picture and schedule virtual visit.     Outreach Frequency: monthly    Plan:   1. Patient and spouse will schedule appointments with cardiology and bariatric surgery.  2.  RN Care Coordinator will follow up in 1 month.    Melissa Behl BSN, RN, PHN, [facility]  Primary Care Clinical RN Care Coordinator  [facility]   [phone]

## 2020-05-26 ENCOUNTER — MYC REFILL (OUTPATIENT)
Dept: FAMILY MEDICINE | Facility: CLINIC | Age: 50
End: 2020-05-26

## 2020-05-26 ENCOUNTER — MYC REFILL (OUTPATIENT)
Dept: PALLIATIVE MEDICINE | Facility: CLINIC | Age: 50
End: 2020-05-26

## 2020-05-26 DIAGNOSIS — E53.8 VITAMIN B12 DEFICIENCY (NON ANEMIC): ICD-10-CM

## 2020-05-26 DIAGNOSIS — G89.4 CHRONIC PAIN SYNDROME: ICD-10-CM

## 2020-05-26 DIAGNOSIS — F90.0 ADHD (ATTENTION DEFICIT HYPERACTIVITY DISORDER), INATTENTIVE TYPE: ICD-10-CM

## 2020-05-26 DIAGNOSIS — G89.29 CHRONIC ABDOMINAL PAIN: ICD-10-CM

## 2020-05-26 DIAGNOSIS — R10.9 CHRONIC ABDOMINAL PAIN: ICD-10-CM

## 2020-05-26 RX ORDER — FENTANYL 25 UG/1
1 PATCH TRANSDERMAL
Qty: 15 PATCH | Refills: 0 | Status: CANCELLED | OUTPATIENT
Start: 2020-05-26

## 2020-05-26 NOTE — TELEPHONE ENCOUNTER
Stoney message from patient on 05/26/2020 at 1601:  Parker Acevedo would like a refill of the following medications:         fentaNYL (DURAGESIC) 25 mcg/hr 72 hr patch [Teressa Perdomo MD]     Preferred pharmacy: [facility] [place] - [address] [facility]  _________________________________________    Will route to MA pool for assistance with gathering opioid refill information.    Linda Ventura RN  Care Coordinator  [facility]        Expected Date Of Service: 03/05/2018 Bill For Surgical Tray: no Billing Type: Third-Party Bill

## 2020-05-27 RX ORDER — CYANOCOBALAMIN 1000 UG/ML
1 INJECTION, SOLUTION INTRAMUSCULAR; SUBCUTANEOUS
Qty: 1 ML | Refills: 11 | Status: SHIPPED | OUTPATIENT
Start: 2020-05-27 | End: 2021-06-02

## 2020-05-27 RX ORDER — FENTANYL 25 UG/1
1 PATCH TRANSDERMAL
Qty: 15 PATCH | Refills: 0 | Status: SHIPPED | OUTPATIENT
Start: 2020-05-27 | End: 2020-06-22

## 2020-05-27 RX ORDER — DEXTROAMPHETAMINE SACCHARATE, AMPHETAMINE ASPARTATE, DEXTROAMPHETAMINE SULFATE AND AMPHETAMINE SULFATE 5; 5; 5; 5 MG/1; MG/1; MG/1; MG/1
TABLET ORAL
Qty: 30 TABLET | Refills: 0 | Status: SHIPPED | OUTPATIENT
Start: 2020-05-27 | End: 2020-06-22

## 2020-05-27 RX ORDER — LORAZEPAM 1 MG/1
TABLET ORAL
Qty: 30 TABLET | Refills: 0 | Status: SHIPPED | OUTPATIENT
Start: 2020-05-27 | End: 2020-06-22

## 2020-05-27 NOTE — TELEPHONE ENCOUNTER
adderall      Last Written Prescription Date:  4/27/20  Last Fill Quantity: 30,   # refills: 0  Last Office Visit: 3/26/20  Future Office visit:    Next 5 appointments (look out 90 days)    Jun 22, 2020 11:30 AM CDT  Telephone Visit with Patti Milligan MD  [facility] ([facility]) [address] [facility]  [place] [address]  [phone]           Routing refill request to provider for review/approval because:  Drug not on the [facility], [facility] or [facility] refill protocol or controlled substance    ativan      Last Written Prescription Date:  4/27/20  Last Fill Quantity: 30,   # refills: 0  Last Office Visit: 3/26/20  Future Office visit:    Next 5 appointments (look out 90 days)    Jun 22, 2020 11:30 AM CDT  Telephone Visit with Patti Milligan MD  [facility] [place] ([facility]) [address] [facility]  [place] [address]  [phone]           Routing refill request to provider for review/approval because:  Drug not on the [facility], [facility] or [facility] refill protocol or controlled substance

## 2020-05-27 NOTE — TELEPHONE ENCOUNTER
Received call from patient requesting refill(s) of fentaNYL (DURAGESIC) 25 mcg/hr 72 hr patch    Last dispensed from pharmacy on 05/04/20    Pt last seen by prescribing provider on 03/23/20-virtual visit  Next appt scheduled for 06/22/20     checked in the past 6 months? Yes If no, print current report and give to RN    Last urine drug screen date 09/11/19  Current opioid agreement on file (completed within the last year) Yes Date of opioid agreement: 09/11/19    Processing (pick one and delete the others):      E-prescribe to pharmacy-[facility] - [place], [place] - [address]       Will route to nursing pool for review and preparation of prescription(s).

## 2020-05-27 NOTE — TELEPHONE ENCOUNTER
Medication refill information reviewed.     Due date for fentaNYL (DURAGESIC) 25 mcg/hr 72 hr patch is 6/4/2020     Prescriptions prepped for review.     Will route to provider.     Skinny Kim, RN  Care Coordinator   [facility] [place]

## 2020-05-28 NOTE — TELEPHONE ENCOUNTER
Script Eprescribed to pharmacy    Will send this to MA team to notify patient.    Signed Prescriptions:                        Disp   Refills    fentaNYL (DURAGESIC) 25 mcg/hr 72 hr patch 15 pat*0        Sig: Place 1 patch onto the skin every 48 hours remove old           patch.   May fill 6/2/20 and start 6/4/20  Authorizing Provider: BRANDYN JENKINS MD  [facility]

## 2020-06-02 ENCOUNTER — HOSPITAL ENCOUNTER (OUTPATIENT)
Dept: LAB | Facility: CLINIC | Age: 50
Discharge: HOME | End: 2020-06-02
Attending: SURGERY | Admitting: SURGERY
Payer: COMMERCIAL

## 2020-06-02 ENCOUNTER — MYC REFILL (OUTPATIENT)
Dept: PALLIATIVE MEDICINE | Facility: CLINIC | Age: 50
End: 2020-06-02

## 2020-06-02 ENCOUNTER — MYC REFILL (OUTPATIENT)
Dept: FAMILY MEDICINE | Facility: CLINIC | Age: 50
End: 2020-06-02

## 2020-06-02 ENCOUNTER — HOME INFUSION (PRE-WILLOW HOME INFUSION) (OUTPATIENT)
Dept: PHARMACY | Facility: CLINIC | Age: 50
End: 2020-06-02

## 2020-06-02 ENCOUNTER — MYC REFILL (OUTPATIENT)
Dept: INTERNAL MEDICINE | Facility: CLINIC | Age: 50
End: 2020-06-02

## 2020-06-02 DIAGNOSIS — Z98.84 GASTRIC BYPASS STATUS FOR OBESITY: ICD-10-CM

## 2020-06-02 DIAGNOSIS — G89.29 CHRONIC ABDOMINAL PAIN: ICD-10-CM

## 2020-06-02 DIAGNOSIS — R11.0 NAUSEA: ICD-10-CM

## 2020-06-02 DIAGNOSIS — E55.9 VITAMIN D DEFICIENCY: ICD-10-CM

## 2020-06-02 DIAGNOSIS — R10.9 CHRONIC ABDOMINAL PAIN: ICD-10-CM

## 2020-06-02 LAB
ALBUMIN SERPL-MCNC: 3.5 G/DL (ref 3.4–5)
ALP SERPL-CCNC: 82 U/L (ref 40–150)
ALT SERPL W P-5'-P-CCNC: 29 U/L (ref 0–70)
ANION GAP SERPL CALCULATED.3IONS-SCNC: 5 MMOL/L (ref 3–14)
AST SERPL W P-5'-P-CCNC: 17 U/L (ref 0–45)
BASOPHILS # BLD AUTO: 0.1 10E9/L (ref 0–0.2)
BASOPHILS NFR BLD AUTO: 0.6 %
BILIRUB DIRECT SERPL-MCNC: <0.1 MG/DL (ref 0–0.2)
BILIRUB SERPL-MCNC: 0.4 MG/DL (ref 0.2–1.3)
BUN SERPL-MCNC: 15 MG/DL (ref 7–30)
CALCIUM SERPL-MCNC: 8.1 MG/DL (ref 8.5–10.1)
CHLORIDE SERPL-SCNC: 106 MMOL/L (ref 94–109)
CO2 SERPL-SCNC: 28 MMOL/L (ref 20–32)
CREAT SERPL-MCNC: 0.7 MG/DL (ref 0.66–1.25)
DIFFERENTIAL METHOD BLD: ABNORMAL
EOSINOPHIL NFR BLD AUTO: 2 %
ERYTHROCYTE [DISTWIDTH] IN BLOOD BY AUTOMATED COUNT: 15.8 % (ref 10–15)
GFR SERPL CREATININE-BSD FRML MDRD: >90 ML/MIN/{1.73_M2}
GLUCOSE SERPL-MCNC: 92 MG/DL (ref 70–99)
HCT VFR BLD AUTO: 39.2 % (ref 40–53)
HGB BLD-MCNC: 12.7 G/DL (ref 13.3–17.7)
IMM GRANULOCYTES # BLD: 0 10E9/L (ref 0–0.4)
IMM GRANULOCYTES NFR BLD: 0.3 %
LYMPHOCYTES # BLD AUTO: 2.4 10E9/L (ref 0.8–5.3)
LYMPHOCYTES NFR BLD AUTO: 22.5 %
MAGNESIUM SERPL-MCNC: 1.9 MG/DL (ref 1.6–2.3)
MCH RBC QN AUTO: 29.4 PG (ref 26.5–33)
MCHC RBC AUTO-ENTMCNC: 32.4 G/DL (ref 31.5–36.5)
MCV RBC AUTO: 91 FL (ref 78–100)
MONOCYTES # BLD AUTO: 1.3 10E9/L (ref 0–1.3)
MONOCYTES NFR BLD AUTO: 12.6 %
NEUTROPHILS # BLD AUTO: 6.6 10E9/L (ref 1.6–8.3)
NEUTROPHILS NFR BLD AUTO: 62 %
NRBC # BLD AUTO: 0 10*3/UL
NRBC BLD AUTO-RTO: 0 /100
PHOSPHATE SERPL-MCNC: 3.8 MG/DL (ref 2.5–4.5)
PLATELET # BLD AUTO: 213 10E9/L (ref 150–450)
POTASSIUM SERPL-SCNC: 3.6 MMOL/L (ref 3.4–5.3)
PROT SERPL-MCNC: 7.2 G/DL (ref 6.8–8.8)
RBC # BLD AUTO: 4.32 10E12/L (ref 4.4–5.9)
SODIUM SERPL-SCNC: 139 MMOL/L (ref 133–144)
TRIGL SERPL-MCNC: 101 MG/DL
WBC # BLD AUTO: 10.6 10E9/L (ref 4–11)

## 2020-06-02 PROCEDURE — 83735 ASSAY OF MAGNESIUM: CPT | Performed by: SURGERY

## 2020-06-02 PROCEDURE — 84100 ASSAY OF PHOSPHORUS: CPT | Performed by: SURGERY

## 2020-06-02 PROCEDURE — 82248 BILIRUBIN DIRECT: CPT | Performed by: SURGERY

## 2020-06-02 PROCEDURE — 80053 COMPREHEN METABOLIC PANEL: CPT | Performed by: SURGERY

## 2020-06-02 PROCEDURE — 84478 ASSAY OF TRIGLYCERIDES: CPT | Performed by: SURGERY

## 2020-06-02 PROCEDURE — 85025 COMPLETE CBC W/AUTO DIFF WBC: CPT | Performed by: SURGERY

## 2020-06-02 RX ORDER — OXYCODONE HCL 5 MG/5 ML
5-10 SOLUTION, ORAL ORAL 2 TIMES DAILY PRN
Qty: 600 ML | Refills: 0 | Status: SHIPPED | OUTPATIENT
Start: 2020-06-02 | End: 2020-06-22

## 2020-06-02 RX ORDER — POLYETHYLENE GLYCOL 3350 17 G/17G
1 POWDER, FOR SOLUTION ORAL DAILY
Qty: 578 G | Refills: 11 | Status: CANCELLED | OUTPATIENT
Start: 2020-06-02

## 2020-06-02 RX ORDER — OXYCODONE HCL 5 MG/5 ML
5-10 SOLUTION, ORAL ORAL 2 TIMES DAILY PRN
Qty: 600 ML | Refills: 0 | Status: CANCELLED | OUTPATIENT
Start: 2020-06-02

## 2020-06-02 NOTE — TELEPHONE ENCOUNTER
polyethylene glycol (MIRALAX) powder was sent to pharmacy with 11 refills in March. Contacted patient via [employer] and asked him to contact the pharmacy.

## 2020-06-02 NOTE — TELEPHONE ENCOUNTER
Medication refill information reviewed.     Due date for oxyCODONE (ROXICODONE) 5 MG/5ML solution is 6/11/20     Prescriptions prepped for review.     Will route to provider.

## 2020-06-02 NOTE — TELEPHONE ENCOUNTER
Requested Prescriptions   Pending Prescriptions Disp Refills     vitamin D2 (ERGOCALCIFEROL) 90933 units (1250 mcg) capsule 12 capsule 3     Sig: Take 1 capsule (50,000 Units) by mouth once a week     Last Written Prescription Date:  08/05/2019  Last Fill Quantity: 12,   # refills: 3  Last Office Visit: 03/26/2020  Future Office visit:    Next 5 appointments (look out 90 days)    Jun 22, 2020 11:30 AM CDT  Telephone Visit with Patti Milligan MD  [facility] [place] ([facility] [place]) [address] [address] [facility]  [place] [address]  [phone]           Routing refill request to provider for review/approval because:  Drug not on the [facility], [facility] or  Health refill protocol or controlled substance    Nicolette Rothman MA     
Bladder non-tender and non-distended. Urine clear yellow.

## 2020-06-02 NOTE — TELEPHONE ENCOUNTER
Received call from patient requesting refill(s) of oxyCODONE (ROXICODONE) 5 MG/5ML solution    Last dispensed from pharmacy on 05/11/20    Pt last seen by prescribing provider on 03/23/20  Next appt scheduled for 06/22/20     checked in the past 6 months? Yes If no, print current report and give to RN    Last urine drug screen date 09/11/19  Current opioid agreement on file (completed within the last year) Yes Date of opioid agreement: 09/11/19    Processing (pick one and delete the others):      E-prescribe to pharmacy-[facility] - [place], [place] - [address]       Will route to nursing pool for review and preparation of prescription(s).

## 2020-06-02 NOTE — TELEPHONE ENCOUNTER
Script Eprescribed to pharmacy    Will send this to MA team to notify patient.    Signed Prescriptions:                        Disp   Refills    oxyCODONE (ROXICODONE) 5 MG/5ML solution   600 mL 0        Sig: Take 5-10 mLs (5-10 mg) by mouth 2 times daily as           needed for pain Max of 20mg/day. 30 day supply.           May fill on 6/9/20 to start on 6/11/20  Authorizing Provider: BRANDYN JENKINS MD  [facility]

## 2020-06-02 NOTE — TELEPHONE ENCOUNTER
Left message by Chris . Rx was E-Prepcribed to:       [facility] [place] - [address]  [address] [address]  Phone: [phone] Fax: [phone]        Patient notified of dispense and start date.     Sahara Farrell [facility]  [place]

## 2020-06-03 NOTE — PROGRESS NOTES
This is a recent snapshot of the patient's [facility] medical record.  For current drug dose and complete information and questions, call [phone] or In Basket pool, fv home infusion ([id number])  CSN Number:  [id number]

## 2020-06-04 ENCOUNTER — TELEPHONE (OUTPATIENT)
Dept: SURGERY | Facility: CLINIC | Age: 50
End: 2020-06-04

## 2020-06-04 ENCOUNTER — HOME INFUSION (PRE-WILLOW HOME INFUSION) (OUTPATIENT)
Dept: PHARMACY | Facility: CLINIC | Age: 50
End: 2020-06-04

## 2020-06-04 RX ORDER — ERGOCALCIFEROL 1.25 MG/1
50000 CAPSULE, LIQUID FILLED ORAL WEEKLY
Qty: 12 CAPSULE | Refills: 3 | Status: SHIPPED | OUTPATIENT
Start: 2020-06-04 | End: 2021-11-15

## 2020-06-04 NOTE — TELEPHONE ENCOUNTER
Spoke with patient's wife.  She states patient has had a low grade temp on an doff since having his Cm put in.  Patient otherwise asymptomatic - denies chills, increased pain or decreased intake.  Wife states in the past blood cultures have not been positive unless patient temp is over 101.  Dr. Vyas notified of patient's low grade temp.

## 2020-06-04 NOTE — TELEPHONE ENCOUNTER
----- Message from Jeb Trevino EMT sent at 6/4/2020  2:43 PM CDT -----  Regarding: FW: patient temps  Hi,  It appears this is a Dr. Vyas pt but I'm not sure. Could someone look into it and help out?  Jeb Ocasio  ----- Message -----  From: Jyothi Peraza, [facility]  Sent: 6/4/2020   2:26 PM CDT  To: [facility]  Subject: patient temps                                    Hello!  Patient's wife reports he has been having low grade fevers, 100.3-100.9  Wife said they usually check blood cultures if temp >101, we do not have an order for this on his home care care plan.    Would you like to check blood cultures, or wait to see if temps get higher?    Also, we have been following Parker's TPN infusion compliance closely with his home nurse since the beginning of April.  He has improved his TPN infusion compliance from 25% to ~80%!    Jyothi Peraza, PharmD [facility]   Ph: [phone]  Fax: [phone]

## 2020-06-04 NOTE — TELEPHONE ENCOUNTER
Lidocaine GI cocktail   Last Written Prescription Date:  5/6/20  Last Fill Quantity: 1 bottle,  # refills: 1   Last office visit: 11/25/2019 with prescribing provider:  Dr. Isaac   Future Office Visit:   Next 5 appointments (look out 90 days)    Jun 22, 2020 11:30 AM CDT  Telephone Visit with Patti Milligan MD  [facility] ([facility]) [address] [address]  [phone]           Zofran  Last Written Prescription Date:  5/6/20  Last Fill Quantity: 90,  # refills: 1    Last office visit: 11/25/2019 with prescribing provider:  Dr. Isaac   Future Office Visit:   Next 5 appointments (look out 90 days)    Jun 22, 2020 11:30 AM CDT  Telephone Visit with Patti Milligan MD  [facility] ([facility]) [address] [address]  [phone]       Routing refill request to provider for review/approval because:  Lidocaine oral Drug not on the [facility] refill protocol   Zofran refill is early request.   CHRISTY Hurst

## 2020-06-05 RX ORDER — ONDANSETRON 8 MG/1
TABLET, ORALLY DISINTEGRATING ORAL
Qty: 90 TABLET | Refills: 1 | Status: SHIPPED | OUTPATIENT
Start: 2020-06-05 | End: 2020-09-24

## 2020-06-10 DIAGNOSIS — Z53.9 DIAGNOSIS NOT YET DEFINED: Primary | ICD-10-CM

## 2020-06-10 PROCEDURE — G0179 MD RECERTIFICATION HHA PT: HCPCS | Performed by: INTERNAL MEDICINE

## 2020-06-11 ENCOUNTER — TELEPHONE (OUTPATIENT)
Dept: INTERNAL MEDICINE | Facility: CLINIC | Age: 50
End: 2020-06-11

## 2020-06-11 NOTE — TELEPHONE ENCOUNTER
Reason for Call:  Form, our goal is to have forms completed with 72 hours, however, some forms may require a visit or additional information.    Type of letter, form or note:  Home Health Certification    Who is the form from?: Home care    Where did the form come from: form was faxed in    What clinic location was the form placed at?: [facility]    Where the form was placed: Given to MA/RN    What number is listed as a contact on the form?:        Additional comments:     Call taken on 6/11/2020 at 2:52 PM by Teetee Parada

## 2020-06-12 RX ORDER — OXYCODONE HCL 5 MG/5 ML
10 SOLUTION, ORAL ORAL EVERY 4 HOURS
COMMUNITY
End: 2020-06-22

## 2020-06-12 RX ORDER — ASCORBIC ACID, VITAMIN A PALMITATE, CHOLECALCIFEROL, THIAMINE HYDROCHLORIDE, RIBOFLAVIN-5 PHOSPHATE SODIUM, PYRIDOXINE HYDROCHLORIDE, NIACINAMIDE, DEXPANTHENOL, ALPHA-TOCOPHEROL ACETATE, VITAMIN K1, FOLIC ACID, BIOTIN, CYANOCOBALAMIN 200; 3300; 200; 6; 3.6; 6; 40; 15; 10; 150; 600; 60; 5 MG/10ML; [IU]/10ML; [IU]/10ML; MG/10ML; MG/10ML; MG/10ML; MG/10ML; MG/10ML; [IU]/10ML; UG/10ML; UG/10ML; UG/10ML; UG/10ML
10 INJECTION, SOLUTION INTRAVENOUS DAILY
COMMUNITY
End: 2020-08-31

## 2020-06-12 NOTE — TELEPHONE ENCOUNTER
Health Care Certification and Plan of Care Form received for Certification period 6/5/20 to 8/3/20. Meds reviewed by RN Forms given to Eveline Parada, our  to have MD sign and Fax to [employer].  Fax [phone]..............CHRISTY Hurst

## 2020-06-13 ENCOUNTER — HOME INFUSION (PRE-WILLOW HOME INFUSION) (OUTPATIENT)
Dept: PHARMACY | Facility: CLINIC | Age: 50
End: 2020-06-13

## 2020-06-15 NOTE — PROGRESS NOTES
This is a recent snapshot of the patient's [facility] medical record.  For current drug dose and complete information and questions, call [phone] or In Basket pool, fv home infusion ([id number])  CSN Number:  [id number]

## 2020-06-16 ENCOUNTER — HOSPITAL ENCOUNTER (OUTPATIENT)
Dept: LAB | Facility: CLINIC | Age: 50
Discharge: HOME | End: 2020-06-16
Attending: SURGERY | Admitting: SURGERY
Payer: COMMERCIAL

## 2020-06-16 LAB
ALBUMIN SERPL-MCNC: 3 G/DL (ref 3.4–5)
ALP SERPL-CCNC: 70 U/L (ref 40–150)
ALT SERPL W P-5'-P-CCNC: 24 U/L (ref 0–70)
ANION GAP SERPL CALCULATED.3IONS-SCNC: 6 MMOL/L (ref 3–14)
AST SERPL W P-5'-P-CCNC: 18 U/L (ref 0–45)
BASOPHILS # BLD AUTO: 0 10E9/L (ref 0–0.2)
BASOPHILS NFR BLD AUTO: 0.5 %
BILIRUB DIRECT SERPL-MCNC: <0.1 MG/DL (ref 0–0.2)
BILIRUB SERPL-MCNC: 0.3 MG/DL (ref 0.2–1.3)
BUN SERPL-MCNC: 16 MG/DL (ref 7–30)
CALCIUM SERPL-MCNC: 8.2 MG/DL (ref 8.5–10.1)
CHLORIDE SERPL-SCNC: 107 MMOL/L (ref 94–109)
CO2 SERPL-SCNC: 28 MMOL/L (ref 20–32)
CREAT SERPL-MCNC: 0.78 MG/DL (ref 0.66–1.25)
DIFFERENTIAL METHOD BLD: ABNORMAL
EOSINOPHIL # BLD AUTO: 0.2 10E9/L (ref 0–0.7)
EOSINOPHIL NFR BLD AUTO: 3.6 %
ERYTHROCYTE [DISTWIDTH] IN BLOOD BY AUTOMATED COUNT: 15.7 % (ref 10–15)
GFR SERPL CREATININE-BSD FRML MDRD: >90 ML/MIN/{1.73_M2}
GLUCOSE SERPL-MCNC: 76 MG/DL (ref 70–99)
HCT VFR BLD AUTO: 33.7 % (ref 40–53)
HGB BLD-MCNC: 10.6 G/DL (ref 13.3–17.7)
IMM GRANULOCYTES # BLD: 0 10E9/L (ref 0–0.4)
IMM GRANULOCYTES NFR BLD: 0.2 %
LYMPHOCYTES # BLD AUTO: 1.7 10E9/L (ref 0.8–5.3)
LYMPHOCYTES NFR BLD AUTO: 25.7 %
MAGNESIUM SERPL-MCNC: 2.1 MG/DL (ref 1.6–2.3)
MCH RBC QN AUTO: 29.8 PG (ref 26.5–33)
MCHC RBC AUTO-ENTMCNC: 31.5 G/DL (ref 31.5–36.5)
MCV RBC AUTO: 95 FL (ref 78–100)
MONOCYTES # BLD AUTO: 0.9 10E9/L (ref 0–1.3)
MONOCYTES NFR BLD AUTO: 13.6 %
NEUTROPHILS # BLD AUTO: 3.7 10E9/L (ref 1.6–8.3)
NEUTROPHILS NFR BLD AUTO: 56.4 %
NRBC # BLD AUTO: 0 10*3/UL
NRBC BLD AUTO-RTO: 0 /100
PHOSPHATE SERPL-MCNC: 3.7 MG/DL (ref 2.5–4.5)
PLATELET # BLD AUTO: 127 10E9/L (ref 150–450)
POTASSIUM SERPL-SCNC: 3.6 MMOL/L (ref 3.4–5.3)
PROT SERPL-MCNC: 6.6 G/DL (ref 6.8–8.8)
RBC # BLD AUTO: 3.56 10E12/L (ref 4.4–5.9)
SODIUM SERPL-SCNC: 141 MMOL/L (ref 133–144)
TRIGL SERPL-MCNC: 71 MG/DL
WBC # BLD AUTO: 6.5 10E9/L (ref 4–11)

## 2020-06-16 PROCEDURE — 84100 ASSAY OF PHOSPHORUS: CPT | Performed by: SURGERY

## 2020-06-16 PROCEDURE — 84478 ASSAY OF TRIGLYCERIDES: CPT | Performed by: SURGERY

## 2020-06-16 PROCEDURE — 82248 BILIRUBIN DIRECT: CPT | Performed by: SURGERY

## 2020-06-16 PROCEDURE — 83735 ASSAY OF MAGNESIUM: CPT | Performed by: SURGERY

## 2020-06-16 PROCEDURE — 85025 COMPLETE CBC W/AUTO DIFF WBC: CPT | Performed by: SURGERY

## 2020-06-16 PROCEDURE — 80053 COMPREHEN METABOLIC PANEL: CPT | Performed by: SURGERY

## 2020-06-17 ENCOUNTER — HOME INFUSION (PRE-WILLOW HOME INFUSION) (OUTPATIENT)
Dept: PHARMACY | Facility: CLINIC | Age: 50
End: 2020-06-17

## 2020-06-18 NOTE — PROGRESS NOTES
This is a recent snapshot of the patient's [facility] medical record.  For current drug dose and complete information and questions, call [phone] or In [place] pool, fv home infusion ([id number])  CSN Number:  [id number]

## 2020-06-22 ENCOUNTER — MYC REFILL (OUTPATIENT)
Dept: CARE COORDINATION | Facility: CLINIC | Age: 50
End: 2020-06-22

## 2020-06-22 ENCOUNTER — VIRTUAL VISIT (OUTPATIENT)
Dept: PALLIATIVE MEDICINE | Facility: CLINIC | Age: 50
End: 2020-06-22
Payer: COMMERCIAL

## 2020-06-22 DIAGNOSIS — G89.29 CHRONIC ABDOMINAL PAIN: Primary | ICD-10-CM

## 2020-06-22 DIAGNOSIS — K90.829 SHORT GUT SYNDROME: ICD-10-CM

## 2020-06-22 DIAGNOSIS — R10.9 CHRONIC ABDOMINAL PAIN: Primary | ICD-10-CM

## 2020-06-22 DIAGNOSIS — E43 SEVERE MALNUTRITION (H): ICD-10-CM

## 2020-06-22 DIAGNOSIS — Z98.84 S/P BARIATRIC SURGERY: ICD-10-CM

## 2020-06-22 DIAGNOSIS — G89.4 CHRONIC PAIN SYNDROME: ICD-10-CM

## 2020-06-22 DIAGNOSIS — F90.0 ADHD (ATTENTION DEFICIT HYPERACTIVITY DISORDER), INATTENTIVE TYPE: ICD-10-CM

## 2020-06-22 PROCEDURE — 99213 OFFICE O/P EST LOW 20 MIN: CPT | Mod: 95 | Performed by: PSYCHIATRY & NEUROLOGY

## 2020-06-22 RX ORDER — OXYCODONE HCL 5 MG/5 ML
5-10 SOLUTION, ORAL ORAL 2 TIMES DAILY PRN
Qty: 600 ML | Refills: 0 | Status: SHIPPED | OUTPATIENT
Start: 2020-06-22 | End: 2020-07-27

## 2020-06-22 RX ORDER — PANTOPRAZOLE SODIUM 40 MG/1
40 TABLET, DELAYED RELEASE ORAL DAILY
Qty: 30 TABLET | Refills: 8 | Status: CANCELLED | OUTPATIENT
Start: 2020-06-22

## 2020-06-22 RX ORDER — FENTANYL 25 UG/1
1 PATCH TRANSDERMAL
Qty: 15 PATCH | Refills: 0 | Status: SHIPPED | OUTPATIENT
Start: 2020-06-22 | End: 2020-07-24

## 2020-06-22 ASSESSMENT — PAIN SCALES - GENERAL: PAINLEVEL: MILD PAIN (3)

## 2020-06-22 NOTE — PROGRESS NOTES
The patient has been notified of following:     This telephone visit will be conducted via a call between you and your provider. We have found that certain health care needs can be provided without the need for a physical exam.  This service lets us provide the care you need with a phone conversation.  If a prescription is necessary we can send it directly to your pharmacy.  If lab work is needed we can place an order for that and you can then stop by our lab to have the test done at a later time. This is a billable service but we do not know the cost at this time.     Patient has given verbal consent for Telephone visit?  Yes  Angeline Calvert CMA (AAMA)                                [facility]  Parker Acevedo is a 49 year old male who is being evaluated via a billable telephone visit      Date of visit: 6/22/2020     Chief complaint:    Chief Complaint   Patient presents with     Pain     Telephone visit due to COVID-19       Interval history:  Parker Acevedo was last seen by me on 3/23/2020     Recommendations/plan at the last visit included:  1. Physical therapy- not at this time with COVID  2. Medications:  1. No changes.  We have discussed concerns about any further increase of meds.  3. Follow up in 3 months- telephone.      Since his last visit, Parker Acevedo reports:  -his pain has been stable.  Comes and goes.  -no new issues going on  -he has had problems with the PICC, and now has a [place]  -oxycodone amount has been reasonable.    Pain scores:  Average pain intensity  4/10    Current pain treatments:   Fentanyl 25mcg/hr patch q48 hours -  Previously was at 50mcg/hr  Lidocaine patches- as needed  Naloxone- has from previous hospitalization.  Oxycodone max of 20mg a day.    Previous medication treatments included:   Valium 5 mg/day, 1 tab in AM and PM-stopped in 11/2017  Tylenol #3   Vicodin   Tramadol   Diazepam- for sleep/anxiety  Gabapentin- bad headaches,  acid reflux  Oxycodone max of 45mg/day.        Side Effects: no side effects    Medications:  Current Outpatient Medications   Medication Sig Dispense Refill     acetaminophen (TYLENOL) 32 mg/mL liquid Take 15.65 mLs (500 mg) by mouth every 4 hours as needed for fever or mild pain 455 mL 1     albuterol (PROAIR HFA/PROVENTIL HFA/VENTOLIN HFA) 108 (90 Base) MCG/ACT inhaler Inhale 2 puffs into the lungs every 4 hours as needed for shortness of breath / dyspnea or wheezing 1 Inhaler 1     alteplase 2 mg/2 mL (in 10 mL syringe) Inject 1 mg into the vein as needed       amphetamine-dextroamphetamine (ADDERALL) 20 MG tablet TAKE ONE TABLET BY MOUTH ONCE DAILY 30 tablet 0     carvedilol (COREG) 6.25 MG tablet Take 6.25 mg by mouth 2 times daily (with meals)       cyanocobalamin (CYANOCOBALAMIN) 1000 MCG/ML injection Inject 1 mL (1,000 mcg) into the muscle every 30 days 1 mL 11     fentaNYL (DURAGESIC) 25 mcg/hr 72 hr patch Place 1 patch onto the skin every 48 hours remove old patch.   May fill 6/2/20 and start 6/4/20 15 patch 0     lidocaine (LIDODERM) 5 % Patch Place 1-2 patches onto the skin every 24 hours Wear for 12 hours, remove for 12 hours.  OK to cut to better fit to size. 60 patch 6     lidocaine, alum & mag hydroxide-simethicone GI Cocktail 30 ml daily as needed for mouth/stomach pain. 1 Bottle 1     LORazepam (ATIVAN) 1 MG tablet TAKE 1 TABLET (1MG) BY MOUTH AS NEEDED FOR ANXIETY WITH TPN AND MEDICATIONS . JUST ONCE A DAY (30 TO LAST 30 DAYS) 30 tablet 0     Multiple Vitamin (INFUVITE ADULT) injection Inject 10 mLs into the vein daily       ondansetron (ZOFRAN-ODT) 8 MG ODT tab DISSOLVE ONE TABLET ON TONGUE EVERY 8 HOURS AS NEEDED FOR NAUSEA 90 tablet 1     oxyCODONE (ROXICODONE) 5 MG/5ML solution Take 10 mg by mouth every 4 hours 10 to 15 mg po every 4 hours prn maxillary 60 mg /day.       oxyCODONE (ROXICODONE) 5 MG/5ML solution Take 5-10 mLs (5-10 mg) by mouth 2 times daily as needed for pain Max of 20mg/day.  "30 day supply. May fill on 6/9/20 to start on 6/11/20 600 mL 0     pantoprazole (PROTONIX) 40 MG EC tablet Take 1 tablet (40 mg) by mouth daily 30 tablet 8     polyethylene glycol (MIRALAX) powder Take 17 g (1 capful) by mouth daily 578 g 11     sodium chloride 0.9% infusion Inject 1,000-2,000 mLs into the vein as needed        sucralfate (CARAFATE) 1 GM tablet Take 1 tablet (1 g) by mouth 4 times daily 120 tablet 3     UNABLE TO FIND States takes TPN 2050 ml  Every 14 hours through [facility]       vitamin D2 (ERGOCALCIFEROL) 28425 units (1250 mcg) capsule Take 1 capsule (50,000 Units) by mouth once a week 12 capsule 3     [facility] INFUSION MANAGED PATIENT Contact [facility] for patient specific medication information at [phone] on admission and discharge from the hospital.  Phones are answered 24 hours a day 7 days a week 365 days a year.    Providers - Choose \"CONTINUE HOME MED (no script)\" at discharge if patient treatment with home infusion will continue.       naloxone (NARCAN) 4 MG/0.1ML nasal spray Spray 1 spray (4 mg) into one nostril alternating nostrils as needed for opioid reversal every 2-3 minutes until assistance arrives (Patient not taking: Reported on 3/24/2020) 0.2 mL 0     Needle, Disp, (BD DISP NEEDLES) 27G X 1/2\" MISC 1 Device every 30 days Use for cyanocobalamin injection once q 30 days. 3 each 4     order for DME Equipment being ordered: Bilateral knee high chronic venous insufficiency stockings--  mild-moderate pressures. 4 each 5     order for DME Equipment being ordered: Urine Drainage bag, leg. 15 Units 11       Medical History: any changes in medical history since they were last seen? no    Review of Systems:  The 14 system ROS was reviewed from the intake questionnaire, and is positive for: Constitutional: fever/chills, fatigue, weight gain, weight loss, infection  Eyes/Head: headache, dizziness  ENT: ringing in ears  Allergy/Immune: allergies  Skin: itching, rash, " hives  Hematologic: easy bruising  Respiratory: cough, wheezing, shortness of breath  Cardiovascular: swelling in feet, fainting, palpitations, chest pain  GI: abdominal pain, nausea, vomiting, diarrhea, constipation  Endocrine: steroid use  Musculoskeletal:  joint pain, arthritis, stiffness, gout, back pain, neck pain  Urinary: frequency, urgency, incontinence, hesitancy  Neurologic: weakness, numbness/tingling, seizure, stroke, memory loss  Mental health: depression, anxiety, stress, suicidal ideation      THE 4 A's OF OPIOID MAINTENANCE ANALGESIA     Analgesia: adequate    Activity: on disability    Adverse effects: none    Adherence to Rx protocol: good- [place] Prescription Monitoring Program checked 6/22/20 appropriate    Last UDS and opioid agreement - 9/11/2019- will obtain after next visit    Assessment:   1. Chronic abdominal pain, with history of gastric bypass and later peptic ulcer   2. G tube placement- only used for venting  3. Myofascial abdominal pain, with significant guarding and poor posture  4. Opioid tolerance  5. Anxiety  6. Foot pain with tendonous injury- healed  7. Left arm weakness, consistent with radial nerve injury- improving  8. Port placement- h/o recurrent infections, getting TPN    Plan:   1. Physical therapy- not at this time with COVID  2. Advised him to reach out re: orthotics  3. Medications:  1. No changes.  Discussed safety re: azevedo and fentanyl patches, and use of hot tub  2. Refills for meds sent in  4. Follow up in 3 months- in person    Phone call duration: 9 minutes    Jessica Milligan MD  [facility]

## 2020-06-22 NOTE — PATIENT INSTRUCTIONS
1. Talk to the foot doctor about new orthotics.    2. Refills sent in- Oxycodone to start 7/11, fentanyl to start 7/4.  Both of those can be picked up 2 days in advance.    3. Follow up in 3 months-  In person- wear a mask.

## 2020-06-23 NOTE — TELEPHONE ENCOUNTER
adderall      Last Written Prescription Date:  05/27/20  Last Fill Quantity: 30,   # refills: 0  Last Office Visit: 3/26/20  Future Office visit:       Routing refill request to provider for review/approval because:  Drug not on the [facility], [facility] or [facility] refill protocol or controlled substance    ativan      Last Written Prescription Date:  05/27/20  Last Fill Quantity: 30,   # refills: 0  Last Office Visit: 3/26/20  Future Office visit:       Routing refill request to provider for review/approval because:  Drug not on the [facility], [facility] or [facility] refill protocol or controlled substance

## 2020-06-24 RX ORDER — LORAZEPAM 1 MG/1
TABLET ORAL
Qty: 30 TABLET | Refills: 0 | Status: SHIPPED | OUTPATIENT
Start: 2020-06-24 | End: 2020-07-23

## 2020-06-24 RX ORDER — DEXTROAMPHETAMINE SACCHARATE, AMPHETAMINE ASPARTATE, DEXTROAMPHETAMINE SULFATE AND AMPHETAMINE SULFATE 5; 5; 5; 5 MG/1; MG/1; MG/1; MG/1
TABLET ORAL
Qty: 30 TABLET | Refills: 0 | Status: SHIPPED | OUTPATIENT
Start: 2020-06-24 | End: 2020-07-23

## 2020-06-25 ENCOUNTER — PATIENT OUTREACH (OUTPATIENT)
Dept: CARE COORDINATION | Facility: CLINIC | Age: 50
End: 2020-06-25

## 2020-06-25 NOTE — PROGRESS NOTES
Clinic Care Coordination Contact  [facility]/Voicemail       Clinical Data: Care Coordinator Outreach  Outreach attempted x 1.  Left message on patient's voicemail with call back information and requested return call.  Plan: Care Coordinator will try to reach patient again in approximately 10 business days.    Melissa Behl BSN, RN, PHN, [facility]  Primary Care Clinical RN Care Coordinator  [facility]   [phone]

## 2020-07-02 ENCOUNTER — HOME INFUSION (PRE-WILLOW HOME INFUSION) (OUTPATIENT)
Dept: PHARMACY | Facility: CLINIC | Age: 50
End: 2020-07-02

## 2020-07-02 ENCOUNTER — HOSPITAL ENCOUNTER (OUTPATIENT)
Dept: LAB | Facility: CLINIC | Age: 50
Discharge: HOME | End: 2020-07-02
Admitting: SURGERY
Payer: COMMERCIAL

## 2020-07-02 LAB
ALBUMIN SERPL-MCNC: 3.4 G/DL (ref 3.4–5)
ALP SERPL-CCNC: 72 U/L (ref 40–150)
ALT SERPL W P-5'-P-CCNC: 21 U/L (ref 0–70)
ANION GAP SERPL CALCULATED.3IONS-SCNC: 5 MMOL/L (ref 3–14)
AST SERPL W P-5'-P-CCNC: 13 U/L (ref 0–45)
BASOPHILS # BLD AUTO: 0.1 10E9/L (ref 0–0.2)
BASOPHILS NFR BLD AUTO: 0.5 %
BILIRUB DIRECT SERPL-MCNC: 0.1 MG/DL (ref 0–0.2)
BILIRUB SERPL-MCNC: 0.5 MG/DL (ref 0.2–1.3)
BUN SERPL-MCNC: 15 MG/DL (ref 7–30)
CALCIUM SERPL-MCNC: 7.8 MG/DL (ref 8.5–10.1)
CHLORIDE SERPL-SCNC: 110 MMOL/L (ref 94–109)
CO2 SERPL-SCNC: 27 MMOL/L (ref 20–32)
CREAT SERPL-MCNC: 0.66 MG/DL (ref 0.66–1.25)
DIFFERENTIAL METHOD BLD: ABNORMAL
EOSINOPHIL NFR BLD AUTO: 2.1 %
ERYTHROCYTE [DISTWIDTH] IN BLOOD BY AUTOMATED COUNT: 16.7 % (ref 10–15)
GFR SERPL CREATININE-BSD FRML MDRD: >90 ML/MIN/{1.73_M2}
GLUCOSE SERPL-MCNC: 83 MG/DL (ref 70–99)
HCT VFR BLD AUTO: 37.9 % (ref 40–53)
HGB BLD-MCNC: 12.3 G/DL (ref 13.3–17.7)
IMM GRANULOCYTES # BLD: 0.1 10E9/L (ref 0–0.4)
IMM GRANULOCYTES NFR BLD: 0.5 %
LYMPHOCYTES # BLD AUTO: 1.8 10E9/L (ref 0.8–5.3)
LYMPHOCYTES NFR BLD AUTO: 19.7 %
MAGNESIUM SERPL-MCNC: 2.2 MG/DL (ref 1.6–2.3)
MCH RBC QN AUTO: 29.8 PG (ref 26.5–33)
MCHC RBC AUTO-ENTMCNC: 32.5 G/DL (ref 31.5–36.5)
MCV RBC AUTO: 92 FL (ref 78–100)
MONOCYTES # BLD AUTO: 1 10E9/L (ref 0–1.3)
MONOCYTES NFR BLD AUTO: 10.4 %
NEUTROPHILS # BLD AUTO: 6.1 10E9/L (ref 1.6–8.3)
NEUTROPHILS NFR BLD AUTO: 66.8 %
NRBC # BLD AUTO: 0 10*3/UL
NRBC BLD AUTO-RTO: 0 /100
PHOSPHATE SERPL-MCNC: 3.3 MG/DL (ref 2.5–4.5)
PLATELET # BLD AUTO: 186 10E9/L (ref 150–450)
POTASSIUM SERPL-SCNC: 3.8 MMOL/L (ref 3.4–5.3)
PROT SERPL-MCNC: 7.1 G/DL (ref 6.8–8.8)
RBC # BLD AUTO: 4.13 10E12/L (ref 4.4–5.9)
SODIUM SERPL-SCNC: 142 MMOL/L (ref 133–144)
TRIGL SERPL-MCNC: 95 MG/DL
WBC # BLD AUTO: 9.1 10E9/L (ref 4–11)

## 2020-07-02 PROCEDURE — 80053 COMPREHEN METABOLIC PANEL: CPT | Performed by: SURGERY

## 2020-07-02 PROCEDURE — 85025 COMPLETE CBC W/AUTO DIFF WBC: CPT | Mod: GZ | Performed by: SURGERY

## 2020-07-02 PROCEDURE — 82248 BILIRUBIN DIRECT: CPT | Performed by: SURGERY

## 2020-07-02 PROCEDURE — 83735 ASSAY OF MAGNESIUM: CPT | Performed by: SURGERY

## 2020-07-02 PROCEDURE — 84478 ASSAY OF TRIGLYCERIDES: CPT | Mod: GZ | Performed by: SURGERY

## 2020-07-02 PROCEDURE — 84100 ASSAY OF PHOSPHORUS: CPT | Performed by: SURGERY

## 2020-07-03 ENCOUNTER — HOME INFUSION (PRE-WILLOW HOME INFUSION) (OUTPATIENT)
Dept: PHARMACY | Facility: CLINIC | Age: 50
End: 2020-07-03

## 2020-07-03 NOTE — PROGRESS NOTES
This is a recent snapshot of the patient's [facility] medical record.  For current drug dose and complete information and questions, call [phone] or In Basket pool, fv home infusion ([id number])  CSN Number:  [id number]

## 2020-07-06 ENCOUNTER — MYC REFILL (OUTPATIENT)
Dept: FAMILY MEDICINE | Facility: CLINIC | Age: 50
End: 2020-07-06

## 2020-07-06 DIAGNOSIS — E53.8 VITAMIN B12 DEFICIENCY (NON ANEMIC): ICD-10-CM

## 2020-07-06 DIAGNOSIS — R11.0 NAUSEA: ICD-10-CM

## 2020-07-06 DIAGNOSIS — Z98.84 GASTRIC BYPASS STATUS FOR OBESITY: ICD-10-CM

## 2020-07-06 NOTE — PROGRESS NOTES
This is a recent snapshot of the patient's [facility] medical record.  For current drug dose and complete information and questions, call [phone] or In Basket pool, fv home infusion ([id number])  CSN Number:  [id number]

## 2020-07-07 RX ORDER — CYANOCOBALAMIN 1000 UG/ML
1 INJECTION, SOLUTION INTRAMUSCULAR; SUBCUTANEOUS
Qty: 1 ML | Refills: 11 | OUTPATIENT
Start: 2020-07-07

## 2020-07-07 RX ORDER — ONDANSETRON 8 MG/1
TABLET, ORALLY DISINTEGRATING ORAL
Qty: 90 TABLET | Refills: 1 | OUTPATIENT
Start: 2020-07-07

## 2020-07-07 NOTE — TELEPHONE ENCOUNTER
Lidocaine GI cocktail  Last Written Prescription Date:  6/5/20  Last Fill Quantity: 1 bottle,  # refills: 1   Last office visit: 10/5/2018 with prescribing provider:  Dr. Álvaro Dickerson  Future Office Visit:   Next 5 appointments (look out 90 days)    Sep 16, 2020  2:45 PM CDT  Return Visit with Patti Milligan MD  [facility] ([place] [facility]) [address] [address]  [phone]       Routing refill request to provider for review/approval because:  Drug not on the [facility] refill protocol   CHRISTY Hurst

## 2020-07-07 NOTE — PLAN OF CARE
VSS on RA with no fevers.  Chronic pain; abd to back, Pain medication given at 1600 and 2200.  Reflux- Magic Mouthwash not needed.  Activity; ind, outside to smoke frequently.  No SOB.  R/o infecton; PICC line removed this AM and tip sent for culture.   IV access; per ID service, Pt. Needs a line holiday; pt. May have a midline if absolutely necessary. On Vancomycin q 8 hours.  Pt wanted vanco to run over 4 hours so PPN not started until 2200. PPN will need to be paused for vanco @ 0200.  Wife, Rose is at bedside and very supportive.  Plan to have PICC placed Thursday.  Continue with POC.   done

## 2020-07-10 ENCOUNTER — PATIENT OUTREACH (OUTPATIENT)
Dept: CARE COORDINATION | Facility: CLINIC | Age: 50
End: 2020-07-10

## 2020-07-10 ENCOUNTER — PATIENT OUTREACH (OUTPATIENT)
Dept: NURSING | Facility: CLINIC | Age: 50
End: 2020-07-10
Payer: COMMERCIAL

## 2020-07-10 ASSESSMENT — ACTIVITIES OF DAILY LIVING (ADL): DEPENDENT_IADLS:: MEDICATION MANAGEMENT;TRANSPORTATION;SHOPPING

## 2020-07-10 NOTE — PROGRESS NOTES
Clinic Care Coordination Contact    Follow Up Progress Note      Assessment: RN CC spoke with patient and spouse for follow up.  There has been no progress on scheduling appointments with cardiology or bariatric surgery Dr. Vyas for follow up.  No reasons cited other than they are working on it.  Stress from the COVID-19 pandemic verbalized and patient and spouse are protecting patient by limiting any contact with outside people.  RN CC reviewed last virtual visit PCP discharge instructions from 3/24/20 office visit, which recommended routing follow up in 3 months.  Patient and spouse state they will hold off on this for a while due to COVID-19 pandemic, but will schedule once occurrence begins to lessen.    Goals addressed this encounter:   Goals Addressed                 This Visit's Progress      #1 Medical (pt-stated)   0%     Goal Statement: I will schedule and attend an appointment with cardiology.  Date Goal set: 10/7/19  Barriers: COVID-19 pandemic, multiple specialists  Strengths: support from spouse  Date to Achieve By: 9/1/20  Patient expressed understanding of goal: yes  Action steps to achieve this goal:  1. I will schedule an appointment with cardiology  2. I will attend appointment with cardiology             #2 Medical (pt-stated)        Goal Statement: I will schedule and attend an appointment with bariatric surgery, Dr. Vyas.  Date Goal set: 10/7/19  Barriers: COVID-19 pandemic, multiple specialists  Strengths: support from spouse, care coordination  Date to Achieve By: 9/1/20  Patient expressed understanding of goal: yes  Action steps to achieve this goal:  1. I will schedule an appointment with Dr. Vyas  2. I will attend appointment with Dr. Vyas  3. I will send a picture of my G-tube bile content to Dr. Vyas via [employer]              #3 Medical (pt-stated)        Goal Statement: I will schedule a routine exam with Dr. Isaac  Date Goal set: 7/10/2020   Barriers: COVID-19  pandemic  Strengths: support from spouse, care coordination  Date to Achieve By: 12/1/20  Patient expressed understanding of goal: yes  Action steps to achieve this goal:  1. I will schedule an appointment with Dr. Isaac  2. I will attend appointment with Dr. Isaac               Intervention/Education provided during outreach: RN CC reviewed plan of care, attempted to explore reasons for not scheduling or sending message to Dr. Vyas via Zova regarding G-tube bile contact, reviewed recent PCP AVS.     Outreach Frequency: monthly    Plan:   1. Patient/spouse will continue to work on  follow up appointments.  2. Patient/spouse will continue to work on sending Zova message with uploaded picture of patient's G-tube bile content.  3. Patient/spouse will consider scheduling PCP routine visit once COVID-19 occurrence lessens.  4. RN Care Coordinator will follow up in 1 month.    Melissa Behl BSN, RN, PHN, CCM  Primary Care Clinical RN Care Coordinator  [facility]   [phone]

## 2020-07-14 ENCOUNTER — HOSPITAL ENCOUNTER (OUTPATIENT)
Dept: LAB | Facility: CLINIC | Age: 50
Discharge: HOME | End: 2020-07-14
Attending: SURGERY | Admitting: SURGERY
Payer: COMMERCIAL

## 2020-07-14 LAB
ALBUMIN SERPL-MCNC: 3.3 G/DL (ref 3.4–5)
ALP SERPL-CCNC: 73 U/L (ref 40–150)
ALT SERPL W P-5'-P-CCNC: 30 U/L (ref 0–70)
ANION GAP SERPL CALCULATED.3IONS-SCNC: 3 MMOL/L (ref 3–14)
AST SERPL W P-5'-P-CCNC: 21 U/L (ref 0–45)
BASOPHILS # BLD AUTO: 0.1 10E9/L (ref 0–0.2)
BASOPHILS NFR BLD AUTO: 0.8 %
BILIRUB DIRECT SERPL-MCNC: 0.1 MG/DL (ref 0–0.2)
BILIRUB SERPL-MCNC: 0.3 MG/DL (ref 0.2–1.3)
BUN SERPL-MCNC: 19 MG/DL (ref 7–30)
CALCIUM SERPL-MCNC: 8.1 MG/DL (ref 8.5–10.1)
CHLORIDE SERPL-SCNC: 108 MMOL/L (ref 94–109)
CO2 SERPL-SCNC: 31 MMOL/L (ref 20–32)
CREAT SERPL-MCNC: 0.82 MG/DL (ref 0.66–1.25)
DIFFERENTIAL METHOD BLD: ABNORMAL
EOSINOPHIL NFR BLD AUTO: 4 %
ERYTHROCYTE [DISTWIDTH] IN BLOOD BY AUTOMATED COUNT: 16.2 % (ref 10–15)
GFR SERPL CREATININE-BSD FRML MDRD: >90 ML/MIN/{1.73_M2}
GLUCOSE SERPL-MCNC: 90 MG/DL (ref 70–99)
HCT VFR BLD AUTO: 34.8 % (ref 40–53)
HGB BLD-MCNC: 11 G/DL (ref 13.3–17.7)
IMM GRANULOCYTES # BLD: 0 10E9/L (ref 0–0.4)
IMM GRANULOCYTES NFR BLD: 0.2 %
LYMPHOCYTES # BLD AUTO: 1.7 10E9/L (ref 0.8–5.3)
LYMPHOCYTES NFR BLD AUTO: 29.1 %
MAGNESIUM SERPL-MCNC: 2.2 MG/DL (ref 1.6–2.3)
MCH RBC QN AUTO: 29.4 PG (ref 26.5–33)
MCHC RBC AUTO-ENTMCNC: 31.6 G/DL (ref 31.5–36.5)
MCV RBC AUTO: 93 FL (ref 78–100)
MONOCYTES # BLD AUTO: 1 10E9/L (ref 0–1.3)
MONOCYTES NFR BLD AUTO: 16.2 %
NEUTROPHILS # BLD AUTO: 3 10E9/L (ref 1.6–8.3)
NEUTROPHILS NFR BLD AUTO: 49.7 %
NRBC # BLD AUTO: 0 10*3/UL
NRBC BLD AUTO-RTO: 0 /100
PHOSPHATE SERPL-MCNC: 3.4 MG/DL (ref 2.5–4.5)
PLATELET # BLD AUTO: 190 10E9/L (ref 150–450)
POTASSIUM SERPL-SCNC: 3.8 MMOL/L (ref 3.4–5.3)
PROT SERPL-MCNC: 7 G/DL (ref 6.8–8.8)
RBC # BLD AUTO: 3.74 10E12/L (ref 4.4–5.9)
SODIUM SERPL-SCNC: 142 MMOL/L (ref 133–144)
TRIGL SERPL-MCNC: 64 MG/DL
WBC # BLD AUTO: 5.9 10E9/L (ref 4–11)

## 2020-07-14 PROCEDURE — 82248 BILIRUBIN DIRECT: CPT | Performed by: SURGERY

## 2020-07-14 PROCEDURE — 80053 COMPREHEN METABOLIC PANEL: CPT | Performed by: SURGERY

## 2020-07-14 PROCEDURE — 84478 ASSAY OF TRIGLYCERIDES: CPT | Performed by: SURGERY

## 2020-07-14 PROCEDURE — 85025 COMPLETE CBC W/AUTO DIFF WBC: CPT | Mod: GZ | Performed by: SURGERY

## 2020-07-14 PROCEDURE — 84100 ASSAY OF PHOSPHORUS: CPT | Performed by: SURGERY

## 2020-07-14 PROCEDURE — 83735 ASSAY OF MAGNESIUM: CPT | Performed by: SURGERY

## 2020-07-15 ENCOUNTER — HOME INFUSION (PRE-WILLOW HOME INFUSION) (OUTPATIENT)
Dept: PHARMACY | Facility: CLINIC | Age: 50
End: 2020-07-15

## 2020-07-17 NOTE — PROGRESS NOTES
This is a recent snapshot of the patient's [facility] medical record.  For current drug dose and complete information and questions, call [phone] or In Basket pool, fv home infusion ([id number])  CSN Number:  [id number]

## 2020-07-22 ENCOUNTER — TELEPHONE (OUTPATIENT)
Dept: SURGERY | Facility: CLINIC | Age: 50
End: 2020-07-22

## 2020-07-23 ENCOUNTER — MYC REFILL (OUTPATIENT)
Dept: PALLIATIVE MEDICINE | Facility: CLINIC | Age: 50
End: 2020-07-23

## 2020-07-23 ENCOUNTER — MYC REFILL (OUTPATIENT)
Dept: CARE COORDINATION | Facility: CLINIC | Age: 50
End: 2020-07-23

## 2020-07-23 DIAGNOSIS — R10.9 CHRONIC ABDOMINAL PAIN: ICD-10-CM

## 2020-07-23 DIAGNOSIS — G89.4 CHRONIC PAIN SYNDROME: ICD-10-CM

## 2020-07-23 DIAGNOSIS — G89.29 CHRONIC ABDOMINAL PAIN: ICD-10-CM

## 2020-07-23 DIAGNOSIS — F90.0 ADHD (ATTENTION DEFICIT HYPERACTIVITY DISORDER), INATTENTIVE TYPE: ICD-10-CM

## 2020-07-23 RX ORDER — FENTANYL 25 UG/1
1 PATCH TRANSDERMAL
Qty: 15 PATCH | Refills: 0 | Status: CANCELLED | OUTPATIENT
Start: 2020-07-23

## 2020-07-24 ENCOUNTER — MYC MEDICAL ADVICE (OUTPATIENT)
Dept: INTERNAL MEDICINE | Facility: CLINIC | Age: 50
End: 2020-07-24

## 2020-07-24 DIAGNOSIS — G89.29 CHRONIC ABDOMINAL PAIN: ICD-10-CM

## 2020-07-24 DIAGNOSIS — R10.9 CHRONIC ABDOMINAL PAIN: ICD-10-CM

## 2020-07-24 RX ORDER — LORAZEPAM 1 MG/1
TABLET ORAL
Qty: 30 TABLET | Refills: 0 | Status: SHIPPED | OUTPATIENT
Start: 2020-07-24 | End: 2020-08-30

## 2020-07-24 RX ORDER — DEXTROAMPHETAMINE SACCHARATE, AMPHETAMINE ASPARTATE, DEXTROAMPHETAMINE SULFATE AND AMPHETAMINE SULFATE 5; 5; 5; 5 MG/1; MG/1; MG/1; MG/1
TABLET ORAL
Qty: 30 TABLET | Refills: 0 | Status: SHIPPED | OUTPATIENT
Start: 2020-07-24 | End: 2020-08-30

## 2020-07-24 RX ORDER — FENTANYL 25 UG/1
1 PATCH TRANSDERMAL
Qty: 15 PATCH | Refills: 0 | Status: ON HOLD | OUTPATIENT
Start: 2020-07-24 | End: 2020-08-31

## 2020-07-24 NOTE — TELEPHONE ENCOUNTER
Stoney message from patient on 7/23 at 2104:            Refills have been requested for the following medications:         fentaNYL (DURAGESIC) 25 mcg/hr 72 hr patch [Patti Milligan MD]     Preferred pharmacy: [facility] [place], [place] - [address] [place] [address]      Will route to MA pool for assistance with gathering opioid refill information.    YADIRA CrookN, RN-BC  Patient Care Supervisor/Care Coordinator  [facility]

## 2020-07-24 NOTE — TELEPHONE ENCOUNTER
Adderall 20 MG       Last Written Prescription Date:  6-  Last Fill Quantity: 30,   # refills: 0  Last Office Visit: 3/26/2020  Future Office visit:    Next 5 appointments (look out 90 days)    Sep 16, 2020  2:45 PM CDT  Return Visit with Patti Milligan MD  [facility] ([place] [facility]) [address]  [place] [address] [phone]           Routing refill request to provider for review/approval because:  Drug not on the [facility], [facility] or [facility] refill protocol or controlled substance  Lorazepam       Last Written Prescription Date:  6-  Last Fill Quantity: 30,   # refills: 0  Last Office Visit: 3/26/2020  Future Office visit:    Next 5 appointments (look out 90 days)    Sep 16, 2020  2:45 PM CDT  Return Visit with Patti Milligan MD  [facility] ([facility]) [address]  [place] [address]  [phone]           Routing refill request to provider for review/approval because:  Drug not on the [facility], [facility] or [facility] refill protocol or controlled substance

## 2020-07-24 NOTE — TELEPHONE ENCOUNTER
Script Eprescribed to pharmacy    Will send this to MA team to notify patient.    Signed Prescriptions:                        Disp   Refills    fentaNYL (DURAGESIC) 25 mcg/hr 72 hr patch 15 pat*0        Sig: Place 1 patch onto the skin every 48 hours remove old           patch.   May fill 8/1/20 and start 8/3/20  Authorizing Provider: BRANDYN JENKINS MD  [facility]

## 2020-07-24 NOTE — TELEPHONE ENCOUNTER
Called [facility]. I told the pharmacist, ok to fill on 7/31/2020 and start 8/3/2020 because pharmacy is close on 8/1/2020.    Notified pt refill will be available for  on 7/31/2020.    Lyndsay Solo MA

## 2020-07-24 NOTE — TELEPHONE ENCOUNTER
Patient requesting refill(s) of oxyCODONE (ROXICODONE) 5 MG/5ML solution   Last dispensed from pharmacy on 7/9/2020    Pt last seen by prescribing provider on 6/22/2020  Next appt scheduled for 9/16/2020     checked in the past 6 months? Yes If no, print current report and give to RN    Last urine drug screen date 9/11/2019  Current opioid agreement on file (completed within the last year) Yes Date of opioid agreement: 9/11/2019    E-prescribe to [facility] [place] - [place], [place]     Will route to nursing [place] for review and preparation of prescription(s).

## 2020-07-24 NOTE — TELEPHONE ENCOUNTER
Received mychart from patient requesting refill(s) of fentaNYL (DURAGESIC) 25 mcg/hr 72 hr patch     Last picked up from pharmacy on 07/02/20    Pt last seen by prescribing provider on 06/22/20    Next appt scheduled for 09/16/20     checked in the past 6 months? Yes If no, print current report and give to RN    Last urine drug screen date 09/11/19  Current opioid agreement on file (completed within the last year) Yes Date of opioid agreement: 09/11/19    Processing (pick one and delete the others):      E-prescribe to     [facility] [place], [place] - [address] [place] [address]  [address] [facility] [address]  Phone: [phone] Fax: [phone]    Will route to nursing pool for review and preparation of prescription(s).    Sahara Farrell [facility]  [place]

## 2020-07-24 NOTE — TELEPHONE ENCOUNTER
Medication refill information reviewed.     Due date for fentaNYL (DURAGESIC) 25 mcg/hr 72 hr patch is 8/3/2020     Prescriptions prepped for review.     Will route to provider.     Skinny Kim, RN  Care Coordinator   [facility] [place]

## 2020-07-25 ENCOUNTER — NURSE TRIAGE (OUTPATIENT)
Dept: NURSING | Facility: CLINIC | Age: 50
End: 2020-07-25

## 2020-07-25 DIAGNOSIS — R50.9 FEVER, UNSPECIFIED FEVER CAUSE: Primary | ICD-10-CM

## 2020-07-26 ENCOUNTER — HOME INFUSION (PRE-WILLOW HOME INFUSION) (OUTPATIENT)
Dept: PHARMACY | Facility: CLINIC | Age: 50
End: 2020-07-26

## 2020-07-26 PROCEDURE — 87186 SC STD MICRODIL/AGAR DIL: CPT | Performed by: SURGERY

## 2020-07-26 PROCEDURE — 87040 BLOOD CULTURE FOR BACTERIA: CPT | Performed by: SURGERY

## 2020-07-26 PROCEDURE — 87800 DETECT AGNT MULT DNA DIREC: CPT | Performed by: SURGERY

## 2020-07-26 PROCEDURE — 87077 CULTURE AEROBIC IDENTIFY: CPT | Performed by: SURGERY

## 2020-07-26 NOTE — TELEPHONE ENCOUNTER
Wife calling - patient has temp 100.6 oral - when it's over 100.5 is when they suspect a Cm infection and like to have blood cultures drawn. Patient needs order for blood culture draw in the home. Dr. Isaac or Dr. Vyas    Paged on call for Dr. Vyas - @ 2120. 2150 - called patient back - no answer.    2156 - noted blood culture order has been entered into chart.    Ayala Hurd RN on 7/25/2020 at 9:57 PM    Additional Information    Negative: Shock suspected (e.g., cold/pale/clammy skin, too weak to stand, low BP, rapid pulse)    Negative: Difficult to awaken or acting confused (e.g., disoriented, slurred speech)    Negative: [1] Difficulty breathing AND [2] bluish lips, tongue or face    Negative: New onset rash with multiple purple (or blood-colored) spots or dots    Negative: Sounds like a life-threatening emergency to the triager    Negative: Fever in a cancer patient who is currently (or recently) receiving chemotherapy or radiation therapy, or cancer patient who has metastatic or end-stage cancer and is receiving palliative care    Negative: Pregnant    Negative: Postpartum (from 0 to 6 weeks after delivery)    Negative: Fever onset within 24 hours of receiving VACCINE    Negative: [1] Fever AND [2] within 21 days of Ebola EXPOSURE    Negative: Other symptom is present, see that guideline  (e.g., symptoms of cough, runny nose, sore throat, earache, abdominal pain, diarrhea, vomiting)    Negative: [1] Headache AND [2] stiff neck (can't touch chin to chest)    Negative: Difficulty breathing    Negative: IV drug abuse    Negative: [1] Drinking very little AND [2] dehydration suspected (e.g., no urine > 12 hours, very dry mouth, very lightheaded)    Negative: Patient sounds very sick or weak to the triager  (Exception: mild weakness and hasn't taken fever medicine)    Negative: Fever > 104 F (40 C)    Negative: [1] Fever > 101 F (38.3 C) AND [2] age > 60    [1] Fever > 100.0 F (37.8 C) AND [2]  bedridden (e.g., nursing home patient, CVA, chronic illness, recovering from surgery)    Protocols used: FEVER-A-AH

## 2020-07-26 NOTE — TELEPHONE ENCOUNTER
Home infusion calling to confirm that labs won't be drawn until tomorrow. Wanting to ensure we were aware of that. Confirmed with wife - that it was understood it would be drawn tomorrow. No further needs at this time.    Ayala Hurd RN on 7/25/2020 at 10:07 PM    Additional Information    Negative: [1] Caller is not with the adult (patient) AND [2] reporting urgent symptoms    Negative: Lab result questions    Negative: Medication questions    Negative: Caller can't be reached by phone    Negative: Caller has already spoken to PCP or another triager    Negative: RN needs further essential information from caller in order to complete triage    Negative: Requesting regular office appointment    Negative: [1] Caller requesting NON-URGENT health information AND [2] PCP's office is the best resource    Health Information question, no triage required and triager able to answer question    Protocols used: INFORMATION ONLY CALL-A-

## 2020-07-27 ENCOUNTER — HOME INFUSION (PRE-WILLOW HOME INFUSION) (OUTPATIENT)
Dept: PHARMACY | Facility: CLINIC | Age: 50
End: 2020-07-27

## 2020-07-27 ENCOUNTER — PATIENT OUTREACH (OUTPATIENT)
Dept: SURGERY | Facility: CLINIC | Age: 50
End: 2020-07-27

## 2020-07-27 RX ORDER — OXYCODONE HCL 5 MG/5 ML
5-10 SOLUTION, ORAL ORAL 2 TIMES DAILY PRN
Qty: 600 ML | Refills: 0 | Status: ON HOLD | OUTPATIENT
Start: 2020-07-27 | End: 2020-08-31

## 2020-07-27 NOTE — PROGRESS NOTES
This is a recent snapshot of the patient's [facility] medical record.  For current drug dose and complete information and questions, call [phone] or In Basket pool, [facility] home infusion ([id number])  CSN Number:  [id number]

## 2020-07-27 NOTE — TELEPHONE ENCOUNTER
Medication refill information reviewed.     Due date for oxyCODONE (ROXICODONE) 5 MG/5ML solution  is 8/10/20     Prescriptions prepped for review.     Will route to provider.

## 2020-07-27 NOTE — TELEPHONE ENCOUNTER
Script Eprescribed to pharmacy    Will send this to MA team to notify patient.    Signed Prescriptions:                        Disp   Refills    oxyCODONE (ROXICODONE) 5 MG/5ML solution   600 mL 0        Sig: Take 5-10 mLs (5-10 mg) by mouth 2 times daily as           needed for pain Max of 20mg/day. 30 day supply.           May fill on 8/8/20 to start on 8/10/20  Authorizing Provider: BRANDYN JENKINS MD  [facility]

## 2020-07-28 ENCOUNTER — HOSPITAL ENCOUNTER (INPATIENT)
Facility: CLINIC | Age: 50
LOS: 6 days | Discharge: HOME HEALTH | DRG: 315 | End: 2020-08-03
Attending: INTERNAL MEDICINE | Admitting: INTERNAL MEDICINE
Payer: COMMERCIAL

## 2020-07-28 ENCOUNTER — APPOINTMENT (OUTPATIENT)
Dept: GENERAL RADIOLOGY | Facility: CLINIC | Age: 50
DRG: 315 | End: 2020-07-28
Attending: INTERNAL MEDICINE
Payer: COMMERCIAL

## 2020-07-28 DIAGNOSIS — R78.81 STAPHYLOCOCCUS EPIDERMIDIS BACTEREMIA: ICD-10-CM

## 2020-07-28 DIAGNOSIS — B95.7 STAPHYLOCOCCUS EPIDERMIDIS BACTEREMIA: ICD-10-CM

## 2020-07-28 DIAGNOSIS — T80.211A: ICD-10-CM

## 2020-07-28 DIAGNOSIS — Z78.9 ON TOTAL PARENTERAL NUTRITION: ICD-10-CM

## 2020-07-28 DIAGNOSIS — R78.81 BACTEREMIA: Primary | ICD-10-CM

## 2020-07-28 DIAGNOSIS — Z71.6 ENCOUNTER FOR TOBACCO USE CESSATION COUNSELING: ICD-10-CM

## 2020-07-28 DIAGNOSIS — Z98.84 STATUS POST BARIATRIC SURGERY: ICD-10-CM

## 2020-07-28 LAB
ALBUMIN SERPL-MCNC: 3.3 G/DL (ref 3.4–5)
ALBUMIN UR-MCNC: NEGATIVE MG/DL
ALP SERPL-CCNC: 69 U/L (ref 40–150)
ALT SERPL W P-5'-P-CCNC: 26 U/L (ref 0–70)
ANION GAP SERPL CALCULATED.3IONS-SCNC: 5 MMOL/L (ref 3–14)
APPEARANCE UR: CLEAR
AST SERPL W P-5'-P-CCNC: 17 U/L (ref 0–45)
BASOPHILS # BLD AUTO: 0.1 10E9/L (ref 0–0.2)
BASOPHILS NFR BLD AUTO: 0.7 %
BILIRUB DIRECT SERPL-MCNC: <0.1 MG/DL (ref 0–0.2)
BILIRUB SERPL-MCNC: 0.4 MG/DL (ref 0.2–1.3)
BILIRUB UR QL STRIP: NEGATIVE
BUN SERPL-MCNC: 10 MG/DL (ref 7–30)
CALCIUM SERPL-MCNC: 8.5 MG/DL (ref 8.5–10.1)
CHLORIDE SERPL-SCNC: 111 MMOL/L (ref 94–109)
CO2 SERPL-SCNC: 25 MMOL/L (ref 20–32)
COLOR UR AUTO: ABNORMAL
CREAT SERPL-MCNC: 0.68 MG/DL (ref 0.66–1.25)
CRP SERPL-MCNC: <2.9 MG/L (ref 0–8)
DEPRECATED S PYO AG THROAT QL EIA: NEGATIVE
DIFFERENTIAL METHOD BLD: ABNORMAL
EOSINOPHIL # BLD AUTO: 0.1 10E9/L (ref 0–0.7)
EOSINOPHIL NFR BLD AUTO: 1.9 %
ERYTHROCYTE [DISTWIDTH] IN BLOOD BY AUTOMATED COUNT: 16 % (ref 10–15)
ERYTHROCYTE [SEDIMENTATION RATE] IN BLOOD BY WESTERGREN METHOD: 10 MM/H (ref 0–15)
GFR SERPL CREATININE-BSD FRML MDRD: >90 ML/MIN/{1.73_M2}
GLUCOSE SERPL-MCNC: 96 MG/DL (ref 70–99)
GLUCOSE UR STRIP-MCNC: NEGATIVE MG/DL
HCT VFR BLD AUTO: 41.6 % (ref 40–53)
HGB BLD-MCNC: 12.9 G/DL (ref 13.3–17.7)
HGB UR QL STRIP: ABNORMAL
IMM GRANULOCYTES # BLD: 0 10E9/L (ref 0–0.4)
IMM GRANULOCYTES NFR BLD: 0.3 %
KETONES UR STRIP-MCNC: NEGATIVE MG/DL
LACTATE BLD-SCNC: 1.2 MMOL/L (ref 0.7–2)
LACTATE BLD-SCNC: 1.6 MMOL/L (ref 0.7–2)
LEUKOCYTE ESTERASE UR QL STRIP: NEGATIVE
LYMPHOCYTES # BLD AUTO: 1.9 10E9/L (ref 0.8–5.3)
LYMPHOCYTES NFR BLD AUTO: 27.6 %
MCH RBC QN AUTO: 28.7 PG (ref 26.5–33)
MCHC RBC AUTO-ENTMCNC: 31 G/DL (ref 31.5–36.5)
MCV RBC AUTO: 92 FL (ref 78–100)
MONOCYTES # BLD AUTO: 0.8 10E9/L (ref 0–1.3)
MONOCYTES NFR BLD AUTO: 11.2 %
NEUTROPHILS # BLD AUTO: 4 10E9/L (ref 1.6–8.3)
NEUTROPHILS NFR BLD AUTO: 58.3 %
NITRATE UR QL: NEGATIVE
NRBC # BLD AUTO: 0 10*3/UL
NRBC BLD AUTO-RTO: 0 /100
PH UR STRIP: 6.5 PH (ref 5–7)
PLATELET # BLD AUTO: 189 10E9/L (ref 150–450)
POTASSIUM SERPL-SCNC: 3.6 MMOL/L (ref 3.4–5.3)
PROT SERPL-MCNC: 7 G/DL (ref 6.8–8.8)
RBC # BLD AUTO: 4.5 10E12/L (ref 4.4–5.9)
RBC #/AREA URNS AUTO: <1 /HPF (ref 0–2)
SODIUM SERPL-SCNC: 141 MMOL/L (ref 133–144)
SOURCE: ABNORMAL
SP GR UR STRIP: 1.01 (ref 1–1.03)
SPECIMEN SOURCE: NORMAL
SPECIMEN SOURCE: NORMAL
STREP GROUP A PCR: NOT DETECTED
UROBILINOGEN UR STRIP-MCNC: NORMAL MG/DL (ref 0–2)
WBC # BLD AUTO: 6.8 10E9/L (ref 4–11)
WBC #/AREA URNS AUTO: <1 /HPF (ref 0–5)

## 2020-07-28 PROCEDURE — 99223 1ST HOSP IP/OBS HIGH 75: CPT | Mod: AI | Performed by: INTERNAL MEDICINE

## 2020-07-28 PROCEDURE — 87181 SC STD AGAR DILUTION PER AGT: CPT | Performed by: EMERGENCY MEDICINE

## 2020-07-28 PROCEDURE — 25000128 H RX IP 250 OP 636: Performed by: INTERNAL MEDICINE

## 2020-07-28 PROCEDURE — 80048 BASIC METABOLIC PNL TOTAL CA: CPT | Performed by: EMERGENCY MEDICINE

## 2020-07-28 PROCEDURE — 25000132 ZZH RX MED GY IP 250 OP 250 PS 637: Performed by: INTERNAL MEDICINE

## 2020-07-28 PROCEDURE — 86140 C-REACTIVE PROTEIN: CPT | Performed by: EMERGENCY MEDICINE

## 2020-07-28 PROCEDURE — 96376 TX/PRO/DX INJ SAME DRUG ADON: CPT

## 2020-07-28 PROCEDURE — 40001204 ZZHCL STATISTIC STREP A RAPID: Performed by: INTERNAL MEDICINE

## 2020-07-28 PROCEDURE — 25800030 ZZH RX IP 258 OP 636: Performed by: INTERNAL MEDICINE

## 2020-07-28 PROCEDURE — 96375 TX/PRO/DX INJ NEW DRUG ADDON: CPT

## 2020-07-28 PROCEDURE — 12000001 ZZH R&B MED SURG/OB UMMC

## 2020-07-28 PROCEDURE — 85025 COMPLETE CBC W/AUTO DIFF WBC: CPT | Performed by: EMERGENCY MEDICINE

## 2020-07-28 PROCEDURE — 83605 ASSAY OF LACTIC ACID: CPT | Performed by: INTERNAL MEDICINE

## 2020-07-28 PROCEDURE — 25000125 ZZHC RX 250: Performed by: INTERNAL MEDICINE

## 2020-07-28 PROCEDURE — U0003 INFECTIOUS AGENT DETECTION BY NUCLEIC ACID (DNA OR RNA); SEVERE ACUTE RESPIRATORY SYNDROME CORONAVIRUS 2 (SARS-COV-2) (CORONAVIRUS DISEASE [COVID-19]), AMPLIFIED PROBE TECHNIQUE, MAKING USE OF HIGH THROUGHPUT TECHNOLOGIES AS DESCRIBED BY CMS-2020-01-R: HCPCS | Performed by: INTERNAL MEDICINE

## 2020-07-28 PROCEDURE — 25000132 ZZH RX MED GY IP 250 OP 250 PS 637: Performed by: STUDENT IN AN ORGANIZED HEALTH CARE EDUCATION/TRAINING PROGRAM

## 2020-07-28 PROCEDURE — 87040 BLOOD CULTURE FOR BACTERIA: CPT | Performed by: EMERGENCY MEDICINE

## 2020-07-28 PROCEDURE — 83605 ASSAY OF LACTIC ACID: CPT | Performed by: EMERGENCY MEDICINE

## 2020-07-28 PROCEDURE — 85652 RBC SED RATE AUTOMATED: CPT | Performed by: EMERGENCY MEDICINE

## 2020-07-28 PROCEDURE — 99285 EMERGENCY DEPT VISIT HI MDM: CPT | Mod: 25

## 2020-07-28 PROCEDURE — 87651 STREP A DNA AMP PROBE: CPT | Performed by: INTERNAL MEDICINE

## 2020-07-28 PROCEDURE — 80076 HEPATIC FUNCTION PANEL: CPT | Performed by: EMERGENCY MEDICINE

## 2020-07-28 PROCEDURE — 87077 CULTURE AEROBIC IDENTIFY: CPT | Performed by: EMERGENCY MEDICINE

## 2020-07-28 PROCEDURE — 81001 URINALYSIS AUTO W/SCOPE: CPT | Performed by: INTERNAL MEDICINE

## 2020-07-28 PROCEDURE — 96366 THER/PROPH/DIAG IV INF ADDON: CPT

## 2020-07-28 PROCEDURE — 87800 DETECT AGNT MULT DNA DIREC: CPT | Performed by: EMERGENCY MEDICINE

## 2020-07-28 PROCEDURE — 96365 THER/PROPH/DIAG IV INF INIT: CPT

## 2020-07-28 PROCEDURE — 99285 EMERGENCY DEPT VISIT HI MDM: CPT | Mod: Z6 | Performed by: EMERGENCY MEDICINE

## 2020-07-28 PROCEDURE — 71045 X-RAY EXAM CHEST 1 VIEW: CPT

## 2020-07-28 RX ORDER — POLYETHYLENE GLYCOL 3350 17 G/17G
17 POWDER, FOR SOLUTION ORAL DAILY
Status: DISCONTINUED | OUTPATIENT
Start: 2020-07-29 | End: 2020-08-03 | Stop reason: HOSPADM

## 2020-07-28 RX ORDER — SUCRALFATE ORAL 1 G/10ML
1 SUSPENSION ORAL 4 TIMES DAILY PRN
Status: ON HOLD | COMMUNITY
End: 2021-02-02

## 2020-07-28 RX ORDER — ONDANSETRON 2 MG/ML
4 INJECTION INTRAMUSCULAR; INTRAVENOUS ONCE
Status: COMPLETED | OUTPATIENT
Start: 2020-07-28 | End: 2020-07-28

## 2020-07-28 RX ORDER — LORAZEPAM 1 MG/1
1 TABLET ORAL DAILY PRN
Status: DISCONTINUED | OUTPATIENT
Start: 2020-07-28 | End: 2020-08-03 | Stop reason: HOSPADM

## 2020-07-28 RX ORDER — DEXTROAMPHETAMINE SACCHARATE, AMPHETAMINE ASPARTATE, DEXTROAMPHETAMINE SULFATE AND AMPHETAMINE SULFATE 2.5; 2.5; 2.5; 2.5 MG/1; MG/1; MG/1; MG/1
20 TABLET ORAL DAILY
Status: DISCONTINUED | OUTPATIENT
Start: 2020-07-29 | End: 2020-08-03 | Stop reason: HOSPADM

## 2020-07-28 RX ORDER — CARVEDILOL 6.25 MG/1
6.25 TABLET ORAL 2 TIMES DAILY WITH MEALS
Status: DISCONTINUED | OUTPATIENT
Start: 2020-07-28 | End: 2020-08-03 | Stop reason: HOSPADM

## 2020-07-28 RX ORDER — SUCRALFATE 1 G/1
1 TABLET ORAL 4 TIMES DAILY
Status: DISCONTINUED | OUTPATIENT
Start: 2020-07-29 | End: 2020-07-29

## 2020-07-28 RX ORDER — NALOXONE HYDROCHLORIDE 0.4 MG/ML
.1-.4 INJECTION, SOLUTION INTRAMUSCULAR; INTRAVENOUS; SUBCUTANEOUS
Status: DISCONTINUED | OUTPATIENT
Start: 2020-07-28 | End: 2020-08-03 | Stop reason: HOSPADM

## 2020-07-28 RX ORDER — OXYCODONE HCL 5 MG/5 ML
5-10 SOLUTION, ORAL ORAL 2 TIMES DAILY PRN
Status: DISCONTINUED | OUTPATIENT
Start: 2020-07-28 | End: 2020-07-29

## 2020-07-28 RX ORDER — OXYCODONE HYDROCHLORIDE 5 MG/1
10 TABLET ORAL ONCE
Status: COMPLETED | OUTPATIENT
Start: 2020-07-28 | End: 2020-07-28

## 2020-07-28 RX ORDER — DIPHENHYDRAMINE HYDROCHLORIDE 50 MG/ML
25 INJECTION INTRAMUSCULAR; INTRAVENOUS ONCE
Status: COMPLETED | OUTPATIENT
Start: 2020-07-28 | End: 2020-07-28

## 2020-07-28 RX ORDER — ONDANSETRON 4 MG/1
4-8 TABLET, ORALLY DISINTEGRATING ORAL EVERY 8 HOURS PRN
Status: DISCONTINUED | OUTPATIENT
Start: 2020-07-28 | End: 2020-08-03 | Stop reason: HOSPADM

## 2020-07-28 RX ORDER — OXYCODONE HYDROCHLORIDE 5 MG/1
10 TABLET ORAL ONCE
Status: DISCONTINUED | OUTPATIENT
Start: 2020-07-28 | End: 2020-07-28

## 2020-07-28 RX ORDER — LIDOCAINE 40 MG/G
CREAM TOPICAL
Status: DISCONTINUED | OUTPATIENT
Start: 2020-07-28 | End: 2020-08-03 | Stop reason: HOSPADM

## 2020-07-28 RX ORDER — PANTOPRAZOLE SODIUM 40 MG/1
40 TABLET, DELAYED RELEASE ORAL DAILY
Status: DISCONTINUED | OUTPATIENT
Start: 2020-07-29 | End: 2020-08-03 | Stop reason: HOSPADM

## 2020-07-28 RX ORDER — ALBUTEROL SULFATE 90 UG/1
2 AEROSOL, METERED RESPIRATORY (INHALATION) EVERY 4 HOURS PRN
Status: DISCONTINUED | OUTPATIENT
Start: 2020-07-28 | End: 2020-08-03 | Stop reason: HOSPADM

## 2020-07-28 RX ORDER — SODIUM CHLORIDE 9 MG/ML
INJECTION, SOLUTION INTRAVENOUS CONTINUOUS
Status: ACTIVE | OUTPATIENT
Start: 2020-07-28 | End: 2020-07-29

## 2020-07-28 RX ORDER — DIPHENHYDRAMINE HCL 25 MG
25 CAPSULE ORAL EVERY 12 HOURS
Status: DISCONTINUED | OUTPATIENT
Start: 2020-07-29 | End: 2020-07-30

## 2020-07-28 RX ORDER — FENTANYL 25 UG/1
25 PATCH TRANSDERMAL
Status: DISCONTINUED | OUTPATIENT
Start: 2020-07-28 | End: 2020-08-03 | Stop reason: HOSPADM

## 2020-07-28 RX ADMIN — LIDOCAINE HYDROCHLORIDE 30 ML: 20 SOLUTION ORAL; TOPICAL at 17:07

## 2020-07-28 RX ADMIN — ONDANSETRON 4 MG: 2 INJECTION INTRAMUSCULAR; INTRAVENOUS at 17:09

## 2020-07-28 RX ADMIN — PROCHLORPERAZINE EDISYLATE 10 MG: 5 INJECTION INTRAMUSCULAR; INTRAVENOUS at 20:43

## 2020-07-28 RX ADMIN — VANCOMYCIN HYDROCHLORIDE 1750 MG: 10 INJECTION, POWDER, LYOPHILIZED, FOR SOLUTION INTRAVENOUS at 17:24

## 2020-07-28 RX ADMIN — CARVEDILOL 6.25 MG: 6.25 TABLET, FILM COATED ORAL at 22:17

## 2020-07-28 RX ADMIN — OXYCODONE HYDROCHLORIDE 10 MG: 5 SOLUTION ORAL at 20:39

## 2020-07-28 RX ADMIN — DIPHENHYDRAMINE HYDROCHLORIDE 25 MG: 50 INJECTION, SOLUTION INTRAMUSCULAR; INTRAVENOUS at 17:09

## 2020-07-28 RX ADMIN — OXYCODONE HYDROCHLORIDE 10 MG: 5 TABLET ORAL at 17:07

## 2020-07-28 RX ADMIN — LORAZEPAM 1 MG: 1 TABLET ORAL at 22:21

## 2020-07-28 RX ADMIN — ONDANSETRON 4 MG: 2 INJECTION INTRAMUSCULAR; INTRAVENOUS at 19:55

## 2020-07-28 ASSESSMENT — ENCOUNTER SYMPTOMS
EYE REDNESS: 0
CHILLS: 1
NECK STIFFNESS: 0
SHORTNESS OF BREATH: 0
FEVER: 1
DIFFICULTY URINATING: 0
ABDOMINAL PAIN: 0
DIAPHORESIS: 1
CONFUSION: 0
COLOR CHANGE: 0
HEADACHES: 1
ARTHRALGIAS: 0
FATIGUE: 1

## 2020-07-28 ASSESSMENT — MIFFLIN-ST. JEOR: SCORE: 1843.13

## 2020-07-28 NOTE — H&P
Medicine History and Physical   Parker Acevedo MRN: [id number]  Age: 49 year old, : 20    Chief Complaint:    HPI:   Parker Acevedo is a 49 year old male with PMH notable for s/p bariatric surgery on chronic TPN, recurrent CLABSI, gastric ulcer, short gut syndrome, here for 1 week of fever, chills, and night sweats.  Pt noticed a couple of weeks ago that his TPN was having more difficulty flushing.  Pt started feeling feverish last Monday.  In addition, he began to have night sweats.  Pt decided to come into the ED now because he was not improving, and measured his temperature at 101.5.  Pt also endorses diarrhea, muscle aches, sore throat, and headache since last week.   Pt hasn't vomited since , but does have persistent nausea.    He has been receiving TPN through his central line for the last 4 years, since his bariatric surgery.  Pt still able to take PO food as tolerated, but often not able to eat much.  As mentioned above, pt often gets infections of his central line, most recently about a year ago.  Pt had latest central line placed about 5 months ago, and has no issues until now.    In ED, blood cx x2 drawn, one from central line - both grew Staph epi.  Unclear if this is a contaminant.    Review of Systems:    Complete 10 point review of systems was performed and negative except per HPI.      Past Medical History    Reviewed and edited as appropriate  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)     mild EF ~45% on rest 17, improves with stressing     Chronic abdominal pain      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     Complication of anesthesia      Difficulty swallowing      Gastric ulcer, unspecified as acute or chronic, without mention of hemorrhage, perforation, or obstruction      Gastro-oesophageal reflux  disease      Head injury      Hiatal hernia      Other bladder disorder      Other chronic pain      PONV (postoperative nausea and vomiting)      Severe malnutrition (H)     TPN     Short gut syndrome      Tobacco abuse        Past Surgical History   Reviewed and edited as appropriate   Past Surgical History:   Procedure Laterality Date     AMPUTATION       APPENDECTOMY       BACK SURGERY  11/3/2014    curve in the spine     BIOPSY LYMPH NODE CERVICAL N/A 2/20/2015    Procedure: BIOPSY LYMPH NODE CERVICAL;  Surgeon: Baron Scanlon MD;  Location: [facility]     C GASTRIC BYPASS,OBESE<100CM SHAYLEE-EN-Y  2002    lost 300 pounds     CHOLECYSTECTOMY       DISCECTOMY, FUSION CERVICAL ANTERIOR ONE LEVEL, COMBINED N/A 2/15/2017    Procedure: COMBINED DISCECTOMY, FUSION CERVICAL ANTERIOR ONE LEVEL;  Surgeon: Darren Campos MD;  Location: [facility]     ENDOSCOPIC INSERTION TUBE GASTROSTOMY  9/9/2013    Procedure: ENDOSCOPIC INSERTION TUBE GASTROSTOMY;;  Surgeon: Francis Vyas MD;  [facility]     ENDOSCOPIC ULTRASOUND UPPER GASTROINTESTINAL TRACT (GI)  4/29/2011    Procedure:ENDOSCOPIC ULTRASOUND UPPER GASTROINTESTINAL TRACT (GI); Both Procedures done Conjointly; Surgeon:NEREIDA HOUSER; [facility]     ENDOSCOPIC ULTRASOUND UPPER GASTROINTESTINAL TRACT (GI)  9/9/2013    Procedure: ENDOSCOPIC ULTRASOUND UPPER GASTROINTESTINAL TRACT (GI);  Endoscopic Ultrasound Guide Gastrostomy Tube Placement  C-arm;  Surgeon: Noe Lizarraga MD;  [facility]     ENDOSCOPY  03/25/11    EGD, [facility]     ENDOSCOPY  08/04/09    Upper Endoscopy, [facility]     ENDOSCOPY  01/05/09    Upper Endoscopy, [facility]     ESOPHAGOSCOPY, GASTROSCOPY, DUODENOSCOPY (EGD), COMBINED  4/20/2011    Procedure:COMBINED ESOPHAGOSCOPY, GASTROSCOPY, DUODENOSCOPY (EGD); Surgeon:FRANCIS VYAS; [facility]     ESOPHAGOSCOPY, GASTROSCOPY, DUODENOSCOPY (EGD), COMBINED  6/15/2011    Procedure:COMBINED  ESOPHAGOSCOPY, GASTROSCOPY, DUODENOSCOPY (EGD); Surgeon:FRANCIS VYAS; [facility]     ESOPHAGOSCOPY, GASTROSCOPY, DUODENOSCOPY (EGD), COMBINED  6/12/2013    Procedure: COMBINED ESOPHAGOSCOPY, GASTROSCOPY, DUODENOSCOPY (EGD);;  Surgeon: Francis Vyas MD;  [facility]     ESOPHAGOSCOPY, GASTROSCOPY, DUODENOSCOPY (EGD), COMBINED  11/22/2013    Procedure: COMBINED ESOPHAGOSCOPY, GASTROSCOPY, DUODENOSCOPY (EGD);;  Surgeon: Francis Vyas MD;  [facility]     ESOPHAGOSCOPY, GASTROSCOPY, DUODENOSCOPY (EGD), COMBINED  4/30/2014    Procedure: COMBINED ESOPHAGOSCOPY, GASTROSCOPY, DUODENOSCOPY (EGD);  Surgeon: Francis Vyas MD;  [facility]     ESOPHAGOSCOPY, GASTROSCOPY, DUODENOSCOPY (EGD), COMBINED N/A 2/20/2015    Procedure: COMBINED ESOPHAGOSCOPY, GASTROSCOPY, DUODENOSCOPY (EGD), BIOPSY SINGLE OR MULTIPLE;  Surgeon: Baron Scanlon MD;  Location:  OR     ESOPHAGOSCOPY, GASTROSCOPY, DUODENOSCOPY (EGD), COMBINED N/A 9/30/2015    Procedure: COMBINED ESOPHAGOSCOPY, GASTROSCOPY, DUODENOSCOPY (EGD);  Surgeon: Francis Vyas MD;  Location:  GI     ESOPHAGOSCOPY, GASTROSCOPY, DUODENOSCOPY (EGD), COMBINED N/A 10/3/2019    Procedure: Upper Endoscopy;  Surgeon: Clif Morrow MD;  [facility]     GASTRECTOMY  6/22/2012    Procedure: GASTRECTOMY;  Open Approach, Excise Ulcers,Partial Gastrectomy, Esophagojejunostomy, Hiatal Hernia Repair, Extensive Lysis of Adhesions and Esaphagogastrodudenoscopy.;  Surgeon: Francis Vyas MD;  [facility]     GASTROJEJUNOSTOMY  08/26/09    Extensice enterolysis, partial resect. jejunum, part. resect gastric pouch, gastrojejunostomy anastomosis     HC ESOPH/GAS REFLUX TEST W NASAL IMPED ELECTRODE  8/5/2013    Procedure: ESOPHAGEAL IMPEDENCE FUNCTION TEST 1 HOUR OR LESS;  Surgeon: Halie Lang MD;  Location:  GI     HEAD & NECK SURGERY  2/15/2017    C5-C6     HERNIA REPAIR  2006    Umbilical hernia      HERNIORRHAPHY HIATAL  6/22/2012    Procedure: HERNIORRHAPHY HIATAL;;  Surgeon: Francis Vyas MD;  [facility]     HERNIORRHAPHY INGUINAL  11/22/2013    Procedure: HERNIORRHAPHY INGUINAL;;  Surgeon: Francis Vyas MD;  [facility]     INSERT PICC LINE Right 12/19/2019    Procedure: Picc Placement;  Surgeon: Per Dumont PA-C;  [facility]     INSERT PICC LINE Right 2/21/2020    Procedure: INSERTION, PICC;  Surgeon: Per Dumont PA-C;  [facility]     INSERT PORT VASCULAR ACCESS Right 12/19/2017    Procedure: INSERT PORT VASCULAR ACCESS;  Right Chest Port Placement ;  Surgeon: Lisandro Alejandro PA-C;  [facility]     INSERT PORT VASCULAR ACCESS Right 8/2/2018    Procedure: INSERT PORT VASCULAR ACCESS;  Place single lumen tunneled central venous access catheter;  Surgeon: Guy Jamil PA-C;  [facility]     IR CVC TUNNEL PLACEMENT > 5 YRS OF AGE  8/7/2019     IR CVC TUNNEL PLACEMENT > 5 YRS OF AGE  4/14/2020     IR CVC TUNNEL REMOVAL RIGHT  10/1/2019     IR FOLLOW UP VISIT OUTPATIENT  8/7/2019     IR PICC PLACEMENT > 5 YRS OF AGE  3/7/2019     IR PICC PLACEMENT > 5 YRS OF AGE  12/19/2019     IR PICC PLACEMENT > 5 YRS OF AGE  2/21/2020     LAPAROTOMY EXPLORATORY  11/22/2013    Procedure: LAPAROTOMY EXPLORATORY;  Exploratory Laparotomy, Upper Endoscopy, Left Inguinal Hernia Repair;  Surgeon: Francis Vyas MD;  [facility]     ORTHOPEDIC SURGERY       PICC INSERTION Right 03/16/2017    5fr DL BioFlo PICC, 42cm (3cm external) in the R medial brachial vein w/ tip in the SVC RA junction.     PICC INSERTION Left 09/23/2017    5fr DL BioFlo PICC, 45cm (1cm external) in the L basilic vein w/ tip in the SVC RA junction.     PICC INSERTION Right 05/16/2019    5Fr - 43cm, Medial brachial vein, low SVC     PICC INSERTION Right 10/02/2019    5Fr - 43cm (2cm external), basilic vein, low SVC     SHAYLEE EN Y BOWEL  2003     SOFT TISSUE SURGERY        THORACIC SURGERY       TONSILLECTOMY       TRANSESOPHAGEAL ECHOCARDIOGRAM INTRAOPERATIVE N/A 2019    Procedure: TRANSESOPHAGEAL ECHOCARDIOGRAM INTRAOPERATIVE;  Surgeon: GENERIC ANESTHESIA PROVIDER;  Location:  OR       Social History   Reviewed and edited as appropriate  Social History     Socioeconomic History     Marital status:      Spouse name: Rose     Number of children: 1     Years of education: Not on file     Highest education level: Not on file   Occupational History     Not on file   Social Needs     Financial resource strain: Not hard at all     Food insecurity     Worry: Never true     Inability: Never true     Transportation needs     Medical: No     Non-medical: No   Tobacco Use     Smoking status: Current Some Day Smoker     Packs/day: 0.25     Years: 3.00     Pack years: 0.75     Types: Cigarettes     Last attempt to quit: 2018     Years since quittin.0     Smokeless tobacco: Never Used     Tobacco comment: I use an e cig every now and than   Substance and Sexual Activity     Alcohol use: No     Comment: quit      Drug use: No     Sexual activity: Yes     Partners: Female     Birth control/protection: None     Comment: no protection   Lifestyle     Physical activity     Days per week: 3 days     Minutes per session: 10 min     Stress: Not on file   Relationships     Social connections     Talks on phone: Not on file     Gets together: Not on file     Attends Muslim service: Not on file     Active member of club or organization: Not on file     Attends meetings of clubs or organizations: Not on file     Relationship status: Not on file     Intimate partner violence     Fear of current or ex partner: Not on file     Emotionally abused: Not on file     Physically abused: Not on file     Forced sexual activity: Not on file   Other Topics Concern     Parent/sibling w/ CABG, MI or angioplasty before 65F 55M? No   Social History Narrative     Not on file       Family History      Reviewed and edited as appropriate  Family History   Problem Relation Age of Onset     Gastrointestinal Disease Mother         Crohns disease     Anxiety Disorder Mother      Thyroid Disease Mother         Grave's disease     Cancer Father         ear cancer-skin cancer/melanoma     Breast Cancer Maternal Grandmother      Macular Degeneration Maternal Grandfather      Anxiety Disorder Sister      Diabetes Maternal Uncle      Breast Cancer Other      Hypertension No family hx of      Hyperlipidemia No family hx of      Cerebrovascular Disease No family hx of      Prostate Cancer No family hx of      Depression No family hx of      Anesthesia Reaction No family hx of      Asthma No family hx of      Osteoporosis No family hx of      Genetic Disorder No family hx of      Obesity No family hx of      Mental Illness No family hx of      Substance Abuse No family hx of      Glaucoma No family hx of        Allergies   Reviewed and edited as appropriate     Allergies   Allergen Reactions     Bactrim [Sulfamethoxazole W/Trimethoprim] Rash     Penicillins Anaphylaxis     Please see Antimicrobial Management Team allergy assessment note 10/10/2018. Patient reported tolerating amoxicillin.     Doxycycline Rash     Vancomycin Rash     Rash after receiving vancomycin 3/28/16 (red man's?). Tolerated with slower infusion and diphenhydramine premed.        Prior to Admission Medications    (Not in a hospital admission)         Physical Exam   /88   Pulse 75   Temp 99.8  F (37.7  C) (Oral)   Resp 16   Wt 94.4 kg (208 lb 1.8 oz)   SpO2 100%   BMI 29.03 kg/m    No intake or output data in the 24 hours ending 07/28/20 1751  Wt:   Wt Readings from Last 2 Encounters:   07/28/20 94.4 kg (208 lb 1.8 oz)   04/14/20 82.1 kg (181 lb)      GEN: pleasant, no acute distress  HEENT: no icterus, MMM  CV: RRR, normal s1,s2, no murmurs/rubs no heave. JVP not visible   CHEST: clear to ausculation bilaterally, no rales or wheezing  ABD:   normal active bowel sounds  -TTP epigastric region with voluntary guarding  -1cm erythema noted at central line site  EXTR: DP 2+ bilaterally . No clubbing, cyanosis or edema.   NEURO: alert oriented, speech fluent/appropriate, motor grossly nonfocal      Assessment and Recommendation:   Mr. Acevedo is a 48yo M with PMHx notable for s/p bariatric surgery on chronic TPN, recurrent CLABSI, gastric ulcer, short gut syndrome, here for 1 week of fever, chills, and night sweats, concerning for central line infection and bacteremia.  Pt still with abdominal tenderness with voluntary guarding.  Pt still with central line in place - mild erythema at site.  Blood cx x2 grew staph epidermidis - unsure if this is a contaminant.   Pt afebrile since admission.    #Central line infection/baceremia  -Vancomycin given in ED - to be continued andrenally dosed by pharmacy  -Pharmacy consulted to create home TPN for patient  -ID consulted - appreciate recs  -TTE ordered to evaluate for endocarditis - f/u on results  -Currently NPO  -NS 100mL/hr    Chronic medical problems    #COPD  -Continue home albuterol PRN    #ADHD  -Continue home adderall 20mg Daily    #GERD  -Continue home pantoprazole 40mg daily    #Cardiomyopathy with reduced EF  -Coreg 6.25mg BID    Patient and plan of care was discussed with staff attending, [facility]  Internal Medicine PGY1  Pager number [phone]        Data   Labs and imaging below were independently reviewed and interpreted    BMP  Recent Labs   Lab 07/28/20  1607      POTASSIUM 3.6   CHLORIDE 111*   SILAS 8.5   CO2 25   BUN 10   CR 0.68   GLC 96     CBC  Recent Labs   Lab 07/28/20  1607   WBC 6.8   RBC 4.50   HGB 12.9*   HCT 41.6   MCV 92   MCH 28.7   MCHC 31.0*   RDW 16.0*        INRNo lab results found in last 7 days.  LFTs  Recent Labs   Lab 07/28/20  1607   ALKPHOS 69   AST 17   ALT 26   BILITOTAL 0.4   PROTTOTAL 7.0   ALBUMIN 3.3*      Troponin  Lab Results   Component  Value Date    TROPI <0.015 10/02/2019    TROPI <0.015 10/02/2019    TROPI <0.015 10/03/2018    TROPI <0.015 06/01/2018    TROPI <0.015 04/25/2018         Imaging:  Results for orders placed or performed during the hospital encounter of 07/28/20   XR Chest Port 1 View    Narrative    Portable chest    INDICATION: Fever    COMPARISON: 11/2/2019    FINDINGS: Heart size appears normal. A new right IJ central venous  catheter is present with tip in the SVC. No pneumothorax. No areas of  consolidation. Bony structures appear grossly intact.      Impression    IMPRESSION: No acute consolidation.    ELENI RECINOS MD     *Note: Due to a large number of results and/or encounters for the requested time period, some results have not been displayed. A complete set of results can be found in Results Review.

## 2020-07-28 NOTE — ED TRIAGE NOTES
Pt presents from home after being called to come in for evaluation of positive blood cultures drawn from TPN port. Pt states for past 1 week has had fevers, chills, and night sweats for past week or so. Pt does not appear in acute distress. Pt has hx bariatric with complications.

## 2020-07-28 NOTE — ED PROVIDER NOTES
[facility] ([facility])  July 28, 2020  History     Chief Complaint   Patient presents with     Fever     The history is provided by the patient and medical records.     Parker Acevedo is a 49 year old male with a medical history of recurrent CLABSI, chronic abdominal pain, gastric ulcer, GERD, short gut syndrome, anxiety and ADHD who presents to the ED for evaluation of fever, chills and night sweats secondary to positive blood cultures drawn from TPN port.    Patient reports having fever Tmax 101.5, diarrhea, diaphoresis, muscle aches, sore throat and headaches for the past week. He understand this is due to the infection of the TPN port on his right side. He notes a previous history of CLABSI.     PAST MEDICAL HISTORY:   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     Complication of anesthesia      Difficulty swallowing      Gastric ulcer, unspecified as acute or chronic, without mention of hemorrhage, perforation, or obstruction      Gastro-oesophageal reflux disease      Head injury      Hiatal hernia      Other bladder disorder      Other chronic pain      PONV (postoperative nausea and vomiting)      Severe malnutrition (H)     TPN     Short gut syndrome      Tobacco abuse        PAST SURGICAL HISTORY:   Past Surgical History:   Procedure Laterality Date     AMPUTATION       APPENDECTOMY       BACK SURGERY  11/3/2014    curve in the spine     BIOPSY LYMPH NODE CERVICAL N/A 2/20/2015    Procedure: BIOPSY LYMPH NODE CERVICAL;  Surgeon: Baron Scanlon MD;  Location: [facility] OR     C GASTRIC BYPASS,OBESE<100CM SHAYLEE-EN-Y  2002    lost 300 pounds     CHOLECYSTECTOMY       DISCECTOMY, FUSION CERVICAL ANTERIOR ONE  LEVEL, COMBINED N/A 2/15/2017    Procedure: COMBINED DISCECTOMY, FUSION CERVICAL ANTERIOR ONE LEVEL;  Surgeon: Darren Campos MD;  Location: [facility] OR     ENDOSCOPIC INSERTION TUBE GASTROSTOMY  9/9/2013    Procedure: ENDOSCOPIC INSERTION TUBE GASTROSTOMY;;  Surgeon: Francis Vyas MD;  [facility]     ENDOSCOPIC ULTRASOUND UPPER GASTROINTESTINAL TRACT (GI)  4/29/2011    Procedure:ENDOSCOPIC ULTRASOUND UPPER GASTROINTESTINAL TRACT (GI); Both Procedures done Conjointly; Surgeon:NEREIDA HOUSER; [facility]     ENDOSCOPIC ULTRASOUND UPPER GASTROINTESTINAL TRACT (GI)  9/9/2013    Procedure: ENDOSCOPIC ULTRASOUND UPPER GASTROINTESTINAL TRACT (GI);  Endoscopic Ultrasound Guide Gastrostomy Tube Placement  C-arm;  Surgeon: Noe Lizarraga MD;  [facility]     ENDOSCOPY  03/25/11    EGD, [facility]     ENDOSCOPY  08/04/09    Upper Endoscopy, [facility]     ENDOSCOPY  01/05/09    Upper Endoscopy, [facility]     ESOPHAGOSCOPY, GASTROSCOPY, DUODENOSCOPY (EGD), COMBINED  4/20/2011    Procedure:COMBINED ESOPHAGOSCOPY, GASTROSCOPY, DUODENOSCOPY (EGD); Surgeon:FRANCIS VYAS; [facility]     ESOPHAGOSCOPY, GASTROSCOPY, DUODENOSCOPY (EGD), COMBINED  6/15/2011    Procedure:COMBINED ESOPHAGOSCOPY, GASTROSCOPY, DUODENOSCOPY (EGD); Surgeon:FRANCIS VYAS; [facility]     ESOPHAGOSCOPY, GASTROSCOPY, DUODENOSCOPY (EGD), COMBINED  6/12/2013    Procedure: COMBINED ESOPHAGOSCOPY, GASTROSCOPY, DUODENOSCOPY (EGD);;  Surgeon: Francis Vyas MD;  [facility]     ESOPHAGOSCOPY, GASTROSCOPY, DUODENOSCOPY (EGD), COMBINED  11/22/2013    Procedure: COMBINED ESOPHAGOSCOPY, GASTROSCOPY, DUODENOSCOPY (EGD);;  Surgeon: Francis Vyas MD;  [facility]     ESOPHAGOSCOPY, GASTROSCOPY, DUODENOSCOPY (EGD), COMBINED  4/30/2014    Procedure: COMBINED ESOPHAGOSCOPY, GASTROSCOPY, DUODENOSCOPY (EGD);  Surgeon: Francis Vyas MD;  [facility]     ESOPHAGOSCOPY,  GASTROSCOPY, DUODENOSCOPY (EGD), COMBINED N/A 2/20/2015    Procedure: COMBINED ESOPHAGOSCOPY, GASTROSCOPY, DUODENOSCOPY (EGD), BIOPSY SINGLE OR MULTIPLE;  Surgeon: Baron Scanlon MD;  Location[facility]     ESOPHAGOSCOPY, GASTROSCOPY, DUODENOSCOPY (EGD), COMBINED N/A 9/30/2015    Procedure: COMBINED ESOPHAGOSCOPY, GASTROSCOPY, DUODENOSCOPY (EGD);  Surgeon: Francis Vyas MD;  Location:  GI     ESOPHAGOSCOPY, GASTROSCOPY, DUODENOSCOPY (EGD), COMBINED N/A 10/3/2019    Procedure: Upper Endoscopy;  Surgeon: Clif Morrow MD;  [facility]     GASTRECTOMY  6/22/2012    Procedure: GASTRECTOMY;  Open Approach, Excise Ulcers,Partial Gastrectomy, Esophagojejunostomy, Hiatal Hernia Repair, Extensive Lysis of Adhesions and Esaphagogastrodudenoscopy.;  Surgeon: Francis Vyas MD;  [facility]     GASTROJEJUNOSTOMY  08/26/09    Extensice enterolysis, partial resect. jejunum, part. resect gastric pouch, gastrojejunostomy anastomosis     HC ESOPH/GAS REFLUX TEST W NASAL IMPED ELECTRODE  8/5/2013    Procedure: ESOPHAGEAL IMPEDENCE FUNCTION TEST 1 HOUR OR LESS;  Surgeon: Halie Lang MD;  Location:  GI     HEAD & NECK SURGERY  2/15/2017    C5-C6     HERNIA REPAIR  2006    Umbilical hernia     HERNIORRHAPHY HIATAL  6/22/2012    Procedure: HERNIORRHAPHY HIATAL;;  Surgeon: Francis Vyas MD;  [facility]     HERNIORRHAPHY INGUINAL  11/22/2013    Procedure: HERNIORRHAPHY INGUINAL;;  Surgeon: Francis Vyas MD;  [facility]     INSERT PICC LINE Right 12/19/2019    Procedure: Picc Placement;  Surgeon: Per Dumont PA-C;  [facility]     INSERT PICC LINE Right 2/21/2020    Procedure: INSERTION, PICC;  Surgeon: Per Dumont PA-C;  [facility]     INSERT PORT VASCULAR ACCESS Right 12/19/2017    Procedure: INSERT PORT VASCULAR ACCESS;  Right Chest Port Placement ;  Surgeon: Lisandro Alejandro PA-C;  [facility]     INSERT PORT VASCULAR ACCESS Right  8/2/2018    Procedure: INSERT PORT VASCULAR ACCESS;  Place single lumen tunneled central venous access catheter;  Surgeon: Guy Jamil PA-C;  [facility]     IR CVC TUNNEL PLACEMENT > 5 YRS OF AGE  8/7/2019     IR CVC TUNNEL PLACEMENT > 5 YRS OF AGE  4/14/2020     IR CVC TUNNEL REMOVAL RIGHT  10/1/2019     IR FOLLOW UP VISIT OUTPATIENT  8/7/2019     IR PICC PLACEMENT > 5 YRS OF AGE  3/7/2019     IR PICC PLACEMENT > 5 YRS OF AGE  12/19/2019     IR PICC PLACEMENT > 5 YRS OF AGE  2/21/2020     LAPAROTOMY EXPLORATORY  11/22/2013    Procedure: LAPAROTOMY EXPLORATORY;  Exploratory Laparotomy, Upper Endoscopy, Left Inguinal Hernia Repair;  Surgeon: Francis Vyas MD;  [facility]     ORTHOPEDIC SURGERY       PICC INSERTION Right 03/16/2017    5fr DL BioFlo PICC, 42cm (3cm external) in the R medial brachial vein w/ tip in the SVC RA junction.     PICC INSERTION Left 09/23/2017    5fr DL BioFlo PICC, 45cm (1cm external) in the L basilic vein w/ tip in the SVC RA junction.     PICC INSERTION Right 05/16/2019    5Fr - 43cm, Medial brachial vein, low SVC     PICC INSERTION Right 10/02/2019    5Fr - 43cm (2cm external), basilic vein, low SVC     SHAYLEE EN Y BOWEL  2003     SOFT TISSUE SURGERY       THORACIC SURGERY       TONSILLECTOMY       TRANSESOPHAGEAL ECHOCARDIOGRAM INTRAOPERATIVE N/A 1/8/2019    Procedure: TRANSESOPHAGEAL ECHOCARDIOGRAM INTRAOPERATIVE;  Surgeon: GENERIC ANESTHESIA PROVIDER;  [facility]       Past medical history, past surgical history, medications, and allergies were reviewed with the patient. Additional pertinent items: None    FAMILY HISTORY:   Family History   Problem Relation Age of Onset     Gastrointestinal Disease Mother         Crohns disease     Anxiety Disorder Mother      Thyroid Disease Mother         Grave's disease     Cancer Father         ear cancer-skin cancer/melanoma     Breast Cancer Maternal Grandmother      Macular Degeneration Maternal Grandfather       Anxiety Disorder Sister      Diabetes Maternal Uncle      Breast Cancer Other      Hypertension No family hx of      Hyperlipidemia No family hx of      Cerebrovascular Disease No family hx of      Prostate Cancer No family hx of      Depression No family hx of      Anesthesia Reaction No family hx of      Asthma No family hx of      Osteoporosis No family hx of      Genetic Disorder No family hx of      Obesity No family hx of      Mental Illness No family hx of      Substance Abuse No family hx of      Glaucoma No family hx of        SOCIAL HISTORY:   Social History     Tobacco Use     Smoking status: Current Some Day Smoker     Packs/day: 0.25     Years: 3.00     Pack years: 0.75     Types: Cigarettes     Last attempt to quit: 2018     Years since quittin.0     Smokeless tobacco: Never Used     Tobacco comment: I use an e cig every now and than   Substance Use Topics     Alcohol use: No     Comment: quit      Social history was reviewed with the patient. Additional pertinent items: None      Patient's Medications   New Prescriptions    No medications on file   Previous Medications    ACETAMINOPHEN (TYLENOL) 32 MG/ML LIQUID    Take 15.65 mLs (500 mg) by mouth every 4 hours as needed for fever or mild pain    ALBUTEROL (PROAIR HFA/PROVENTIL HFA/VENTOLIN HFA) 108 (90 BASE) MCG/ACT INHALER    Inhale 2 puffs into the lungs every 4 hours as needed for shortness of breath / dyspnea or wheezing    ALTEPLASE 2 MG/2 ML (IN 10 ML SYRINGE)    Inject 1 mg into the vein as needed    AMPHETAMINE-DEXTROAMPHETAMINE (ADDERALL) 20 MG TABLET    TAKE ONE TABLET BY MOUTH ONCE DAILY    CARVEDILOL (COREG) 6.25 MG TABLET    Take 6.25 mg by mouth 2 times daily (with meals)    CYANOCOBALAMIN (CYANOCOBALAMIN) 1000 MCG/ML INJECTION    Inject 1 mL (1,000 mcg) into the muscle every 30 days    [facility]NAGED PATIENT    Contact [facility] for patient specific medication information at [phone]  "on admission and discharge from the hospital.  Phones are answered 24 hours a day 7 days a week 365 days a year.    Providers - Choose \"CONTINUE HOME MED (no script)\" at discharge if patient treatment with home infusion will continue.    FENTANYL (DURAGESIC) 25 MCG/HR 72 HR PATCH    Place 1 patch onto the skin every 48 hours remove old patch.   May fill 8/1/20 and start 8/3/20    LIDOCAINE, ALUM & MAG HYDROXIDE-SIMETHICONE GI COCKTAIL    30 ml daily as needed for mouth/stomach pain.    LORAZEPAM (ATIVAN) 1 MG TABLET    TAKE 1 TABLET (1MG) BY MOUTH AS NEEDED FOR ANXIETY WITH TPN AND MEDICATIONS . JUST ONCE A DAY (30 TO LAST 30 DAYS)    MULTIPLE VITAMIN (INFUVITE ADULT) INJECTION    Inject 10 mLs into the vein daily    NALOXONE (NARCAN) 4 MG/0.1ML NASAL SPRAY    Spray 1 spray (4 mg) into one nostril alternating nostrils as needed for opioid reversal every 2-3 minutes until assistance arrives    NEEDLE, DISP, (BD DISP NEEDLES) 27G X 1/2\" MISC    1 Device every 30 days Use for cyanocobalamin injection once q 30 days.    ONDANSETRON (ZOFRAN-ODT) 8 MG ODT TAB    DISSOLVE ONE TABLET ON TONGUE EVERY 8 HOURS AS NEEDED FOR NAUSEA    ORDER FOR DME    Equipment being ordered: Urine Drainage bag, leg.    ORDER FOR DME    Equipment being ordered: Bilateral knee high chronic venous insufficiency stockings--  mild-moderate pressures.    OXYCODONE (ROXICODONE) 5 MG/5ML SOLUTION    Take 5-10 mLs (5-10 mg) by mouth 2 times daily as needed for pain Max of 20mg/day. 30 day supply. May fill on 8/8/20 to start on 8/10/20    PANTOPRAZOLE (PROTONIX) 40 MG EC TABLET    Take 1 tablet (40 mg) by mouth daily    PARENTERAL NUTRITION - PTA/DISCHARGE ORDER    Patient receives 2050 mL TPN every 14 hours through PICC at [facility].    SODIUM CHLORIDE 0.9% INFUSION    Inject 1,000-2,000 mLs into the vein as needed     SUCRALFATE (CARAFATE) 1 GM/10ML SUSPENSION    Take 1 g by mouth 4 times daily as needed    VITAMIN D2 (ERGOCALCIFEROL) " 05266 UNITS (1250 MCG) CAPSULE    Take 1 capsule (50,000 Units) by mouth once a week   Modified Medications    No medications on file   Discontinued Medications    LIDOCAINE (LIDODERM) 5 % PATCH    Place 1-2 patches onto the skin every 24 hours Wear for 12 hours, remove for 12 hours.  OK to cut to better fit to size.    POLYETHYLENE GLYCOL (MIRALAX) POWDER    Take 17 g (1 capful) by mouth daily    SUCRALFATE (CARAFATE) 1 GM TABLET    Take 1 tablet (1 g) by mouth 4 times daily    UNABLE TO FIND    States takes TPN 2050 ml  Every 14 hours through PICC          Allergies   Allergen Reactions     Bactrim [Sulfamethoxazole W/Trimethoprim] Rash     Penicillins Anaphylaxis     Please see Antimicrobial Management Team allergy assessment note 10/10/2018. Patient reported tolerating amoxicillin.     Doxycycline Rash     Vancomycin Rash     Rash after receiving vancomycin 3/28/16 (red man's?). Tolerated with slower infusion and diphenhydramine premed.        Review of Systems   Constitutional: Positive for chills, diaphoresis, fatigue and fever.   HENT: Negative for congestion.    Eyes: Negative for redness.   Respiratory: Negative for shortness of breath.    Cardiovascular: Negative for chest pain.   Gastrointestinal: Negative for abdominal pain.   Genitourinary: Negative for difficulty urinating.   Musculoskeletal: Negative for arthralgias and neck stiffness.   Skin: Negative for color change.   Neurological: Positive for headaches.   Psychiatric/Behavioral: Negative for confusion.   All other systems reviewed and are negative.    A complete review of systems was performed with pertinent positives and negatives noted in the HPI, and all other systems negative.    Physical Exam   BP: (!) 146/81  Pulse: 75  Temp: 99.8  F (37.7  C)  Resp: 18  Weight: 94.4 kg (208 lb 1.8 oz)  SpO2: 99 %      Physical Exam  Constitutional:       General: He is not in acute distress.     Appearance: He is not diaphoretic.   HENT:      Head:  Atraumatic.   Eyes:      General: No scleral icterus.     Pupils: Pupils are equal, round, and reactive to light.   Neck:      Musculoskeletal: Neck supple.   Cardiovascular:      Rate and Rhythm: Normal rate and regular rhythm.      Heart sounds: Normal heart sounds. No murmur. No friction rub. No gallop.    Pulmonary:      Effort: Pulmonary effort is normal. No respiratory distress.      Breath sounds: Normal breath sounds. No stridor. No wheezing, rhonchi or rales.   Chest:      Chest wall: No tenderness.       Abdominal:      General: Abdomen is flat. Bowel sounds are normal. There is no distension.      Palpations: Abdomen is soft. There is no mass.      Tenderness: There is no abdominal tenderness. There is no right CVA tenderness, left CVA tenderness, guarding or rebound.      Hernia: No hernia is present.   Musculoskeletal:         General: No tenderness.   Skin:     General: Skin is warm.      Findings: No rash.   Neurological:      General: No focal deficit present.         ED Course   4:19 PM  The patient was seen and examined by Dr. Barak Case Md in Room ED16.       Procedures            Results for orders placed or performed during the hospital encounter of 07/28/20 (from the past 24 hour(s))   CBC with platelets differential   Result Value Ref Range    WBC 6.8 4.0 - 11.0 10e9/L    RBC Count 4.50 4.4 - 5.9 10e12/L    Hemoglobin 12.9 (L) 13.3 - 17.7 g/dL    Hematocrit 41.6 40.0 - 53.0 %    MCV 92 78 - 100 fl    MCH 28.7 26.5 - 33.0 pg    MCHC 31.0 (L) 31.5 - 36.5 g/dL    RDW 16.0 (H) 10.0 - 15.0 %    Platelet Count 189 150 - 450 10e9/L    Diff Method Automated Method     % Neutrophils 58.3 %    % Lymphocytes 27.6 %    % Monocytes 11.2 %    % Eosinophils 1.9 %    % Basophils 0.7 %    % Immature Granulocytes 0.3 %    Nucleated RBCs 0 0 /100    Absolute Neutrophil 4.0 1.6 - 8.3 10e9/L    Absolute Lymphocytes 1.9 0.8 - 5.3 10e9/L    Absolute Monocytes 0.8 0.0 - 1.3 10e9/L    Absolute Eosinophils 0.1 0.0  - 0.7 10e9/L    Absolute Basophils 0.1 0.0 - 0.2 10e9/L    Abs Immature Granulocytes 0.0 0 - 0.4 10e9/L    Absolute Nucleated RBC 0.0    Basic metabolic panel   Result Value Ref Range    Sodium 141 133 - 144 mmol/L    Potassium 3.6 3.4 - 5.3 mmol/L    Chloride 111 (H) 94 - 109 mmol/L    Carbon Dioxide 25 20 - 32 mmol/L    Anion Gap 5 3 - 14 mmol/L    Glucose 96 70 - 99 mg/dL    Urea Nitrogen 10 7 - 30 mg/dL    Creatinine 0.68 0.66 - 1.25 mg/dL    GFR Estimate >90 >60 mL/min/[1.73_m2]    GFR Estimate If Black >90 >60 mL/min/[1.73_m2]    Calcium 8.5 8.5 - 10.1 mg/dL   Lactic acid   Result Value Ref Range    Lactic Acid 1.6 0.7 - 2.0 mmol/L   Hepatic panel   Result Value Ref Range    Bilirubin Direct <0.1 0.0 - 0.2 mg/dL    Bilirubin Total 0.4 0.2 - 1.3 mg/dL    Albumin 3.3 (L) 3.4 - 5.0 g/dL    Protein Total 7.0 6.8 - 8.8 g/dL    Alkaline Phosphatase 69 40 - 150 U/L    ALT 26 0 - 70 U/L    AST 17 0 - 45 U/L   CRP inflammation   Result Value Ref Range    CRP Inflammation <2.9 0.0 - 8.0 mg/L   Erythrocyte sedimentation rate auto   Result Value Ref Range    Sed Rate 10 0 - 15 mm/h   XR Chest Port 1 View    Narrative    Portable chest    INDICATION: Fever    COMPARISON: 11/2/2019    FINDINGS: Heart size appears normal. A new right IJ central venous  catheter is present with tip in the SVC. No pneumothorax. No areas of  consolidation. Bony structures appear grossly intact.      Impression    IMPRESSION: No acute consolidation.    ELENI RECINOS MD   Blood culture    Specimen: Blood    PURPLE PORT   Result Value Ref Range    Specimen Description Blood PURPLE PORT     Culture Micro PENDING    Lactic acid whole blood   Result Value Ref Range    Lactic Acid 1.2 0.7 - 2.0 mmol/L   Streptococcus A Rapid Scr w Reflx to PCR    Specimen: Throat   Result Value Ref Range    Strep Specimen Description Throat     Streptococcus Group A Rapid Screen Negative NEG^Negative   Group A Streptococcus PCR Throat Swab    Specimen: Throat    Result Value Ref Range    Specimen Description Throat     Strep Group A PCR Not Detected NDET^Not Detected   UA reflex to Microscopic and Culture    Specimen: Urine clean catch; Unspecified Urine   Result Value Ref Range    Color Urine Light Yellow     Appearance Urine Clear     Glucose Urine Negative NEG^Negative mg/dL    Bilirubin Urine Negative NEG^Negative    Ketones Urine Negative NEG^Negative mg/dL    Specific Gravity Urine 1.007 1.003 - 1.035    Blood Urine Trace (A) NEG^Negative    pH Urine 6.5 5.0 - 7.0 pH    Protein Albumin Urine Negative NEG^Negative mg/dL    Urobilinogen mg/dL Normal 0.0 - 2.0 mg/dL    Nitrite Urine Negative NEG^Negative    Leukocyte Esterase Urine Negative NEG^Negative    Source Unspecified Urine     RBC Urine <1 0 - 2 /HPF    WBC Urine <1 0 - 5 /HPF     *Note: Due to a large number of results and/or encounters for the requested time period, some results have not been displayed. A complete set of results can be found in Results Review.     Medications   vancomycin (VANCOCIN) 1,750 mg in sodium chloride 0.9 % 500 mL intermittent infusion (0 mg Intravenous Stopped 7/28/20 2118)   diphenhydrAMINE (BENADRYL) capsule 25 mg (has no administration in time range)   carvedilol (COREG) tablet 6.25 mg (6.25 mg Oral Given 7/28/20 2217)   fentaNYL (DURAGESIC) 25 mcg/hr 72 hr patch 1 patch (has no administration in time range)   LORazepam (ATIVAN) tablet 1 mg (1 mg Oral Given 7/28/20 2221)   oxyCODONE (ROXICODONE) solution 5-10 mg (10 mg Oral Given 7/28/20 2039)   fentaNYL (DURAGESIC) Patch in Place (has no administration in time range)   ondansetron (ZOFRAN) injection 4 mg (4 mg Intravenous Given 7/28/20 1709)   lidocaine (XYLOCAINE) 2 % 15 mL, alum & mag hydroxide-simethicone (MAALOX  ES) 15 mL GI Cocktail (30 mLs Oral Given 7/28/20 1707)   oxyCODONE (ROXICODONE) tablet 10 mg (10 mg Oral Given 7/28/20 1707)   diphenhydrAMINE (BENADRYL) injection 25 mg (25 mg Intravenous Given 7/28/20 1709)    ondansetron (ZOFRAN) injection 4 mg (4 mg Intravenous Given 7/28/20 1955)   prochlorperazine (COMPAZINE) injection 10 mg (10 mg Intravenous Given 7/28/20 2043)             Assessments & Plan (with Medical Decision Making)    Staph epidermidis bacteremia from 2/2 blood culture within 24 hours in the pt with right Cm cath for TPN, s/p bariatric surgery, h/o line sepsis, unfortunately very suspicious for another episode of line sepsis, HD stable and lactic acid normal, no MS change or tachypnea to suggest sepsis, cx repeated and start vanco-with bendaryl premed and slower rate. D/W Medicine-admit.         I have reviewed the nursing notes.    I have reviewed the findings, diagnosis, plan and need for follow up with the patient.    New Prescriptions    No medications on file       Final diagnoses:   Staphylococcus epidermidis bacteremia   Bloodstream infection due to Cm catheter, initial encounter   On total parenteral nutrition   Status post bariatric surgery     I, Peter Koenig, am serving as a trained medical scribe to document services personally performed by Barak Case Md, MD, based on the provider's statements to me.     IBarak Md, MD, was physically present and have reviewed and verified the accuracy of this note documented by Peter Koenig.    7/28/2020   [facility], [place], EMERGENCY DEPARTMENT     Barak Case MD  07/28/20 1550

## 2020-07-28 NOTE — PHARMACY-VANCOMYCIN DOSING SERVICE
Pharmacy Vancomycin Initial Note  Date of Service 2020  Patient's  1970  49 year old, male    Indication: Bacteremia    Current estimated CrCl = Estimated Creatinine Clearance: 157.5 mL/min (based on SCr of 0.68 mg/dL).    Creatinine for last 3 days  2020:  4:07 PM Creatinine 0.68 mg/dL    Recent Vancomycin Level(s) for last 3 days  No results found for requested labs within last 72 hours.      Vancomycin IV Administrations (past 72 hours)      No vancomycin orders with administrations in past 72 hours.                Nephrotoxins and other renal medications (From now, onward)    Start     Dose/Rate Route Frequency Ordered Stop    20 1640  vancomycin (VANCOCIN) 1,750 mg in sodium chloride 0.9 % 500 mL intermittent infusion      1,750 mg  over 3 Hours Intravenous EVERY 12 HOURS 20 1640            Contrast Orders - past 72 hours (72h ago, onward)    None                Plan:  1.  Start vancomycin  1750 mg IV q12h (18.5mg/kg using ABW = 94.4kg).  Due to patient's history of Tom Syndrome patient needs extended infusion and diphenhydramine premedication.  Will run dose over 3 hours.   2.  Goal Trough Level: 15-20 mg/L   3.  Pharmacy will check trough levels as appropriate in 1-3 Days.    4. Serum creatinine levels will be ordered daily for the first week of therapy and at least twice weekly for subsequent weeks.    5. [place] method utilized to dose vancomycin therapy: Method 2    Teressa Casillas, PharmD

## 2020-07-28 NOTE — PROGRESS NOTES
This is a recent snapshot of the patient's [facility] medical record.  For current drug dose and complete information and questions, call [phone] or In Basket pool, [facility] home infusion ([id number])  CSN Number:  [id number]

## 2020-07-29 ENCOUNTER — APPOINTMENT (OUTPATIENT)
Dept: CARDIOLOGY | Facility: CLINIC | Age: 50
DRG: 315 | End: 2020-07-29
Payer: COMMERCIAL

## 2020-07-29 LAB
GLUCOSE BLDC GLUCOMTR-MCNC: 87 MG/DL (ref 70–99)
INR PPP: 1.1 (ref 0.86–1.14)
MAGNESIUM SERPL-MCNC: 2.2 MG/DL (ref 1.6–2.3)
SARS-COV-2 RNA SPEC QL NAA+PROBE: NOT DETECTED
SPECIMEN SOURCE: NORMAL

## 2020-07-29 PROCEDURE — 99233 SBSQ HOSP IP/OBS HIGH 50: CPT | Performed by: INTERNAL MEDICINE

## 2020-07-29 PROCEDURE — 25000132 ZZH RX MED GY IP 250 OP 250 PS 637: Performed by: STUDENT IN AN ORGANIZED HEALTH CARE EDUCATION/TRAINING PROGRAM

## 2020-07-29 PROCEDURE — 36415 COLL VENOUS BLD VENIPUNCTURE: CPT | Performed by: PHYSICIAN ASSISTANT

## 2020-07-29 PROCEDURE — 87040 BLOOD CULTURE FOR BACTERIA: CPT | Performed by: PHYSICIAN ASSISTANT

## 2020-07-29 PROCEDURE — 25000132 ZZH RX MED GY IP 250 OP 250 PS 637: Performed by: INTERNAL MEDICINE

## 2020-07-29 PROCEDURE — 25000128 H RX IP 250 OP 636: Performed by: INTERNAL MEDICINE

## 2020-07-29 PROCEDURE — 00000146 ZZHCL STATISTIC GLUCOSE BY METER IP

## 2020-07-29 PROCEDURE — 25000125 ZZHC RX 250: Performed by: STUDENT IN AN ORGANIZED HEALTH CARE EDUCATION/TRAINING PROGRAM

## 2020-07-29 PROCEDURE — 40000556 ZZH STATISTIC PERIPHERAL IV START W US GUIDANCE

## 2020-07-29 PROCEDURE — 25000125 ZZHC RX 250: Performed by: INTERNAL MEDICINE

## 2020-07-29 PROCEDURE — 85610 PROTHROMBIN TIME: CPT | Performed by: PHYSICIAN ASSISTANT

## 2020-07-29 PROCEDURE — 36415 COLL VENOUS BLD VENIPUNCTURE: CPT | Performed by: STUDENT IN AN ORGANIZED HEALTH CARE EDUCATION/TRAINING PROGRAM

## 2020-07-29 PROCEDURE — 83735 ASSAY OF MAGNESIUM: CPT | Performed by: STUDENT IN AN ORGANIZED HEALTH CARE EDUCATION/TRAINING PROGRAM

## 2020-07-29 PROCEDURE — 93306 TTE W/DOPPLER COMPLETE: CPT | Mod: 26 | Performed by: INTERNAL MEDICINE

## 2020-07-29 PROCEDURE — 87040 BLOOD CULTURE FOR BACTERIA: CPT | Performed by: INTERNAL MEDICINE

## 2020-07-29 PROCEDURE — 12000001 ZZH R&B MED SURG/OB UMMC

## 2020-07-29 PROCEDURE — 25800030 ZZH RX IP 258 OP 636: Performed by: INTERNAL MEDICINE

## 2020-07-29 PROCEDURE — 25000128 H RX IP 250 OP 636: Performed by: STUDENT IN AN ORGANIZED HEALTH CARE EDUCATION/TRAINING PROGRAM

## 2020-07-29 PROCEDURE — 93306 TTE W/DOPPLER COMPLETE: CPT

## 2020-07-29 PROCEDURE — 36415 COLL VENOUS BLD VENIPUNCTURE: CPT | Performed by: INTERNAL MEDICINE

## 2020-07-29 PROCEDURE — 25800030 ZZH RX IP 258 OP 636: Performed by: STUDENT IN AN ORGANIZED HEALTH CARE EDUCATION/TRAINING PROGRAM

## 2020-07-29 RX ORDER — POLYETHYLENE GLYCOL 3350 17 G
2 POWDER IN PACKET (EA) ORAL
Status: DISCONTINUED | OUTPATIENT
Start: 2020-07-29 | End: 2020-08-03 | Stop reason: HOSPADM

## 2020-07-29 RX ORDER — SUCRALFATE ORAL 1 G/10ML
1 SUSPENSION ORAL
Status: DISCONTINUED | OUTPATIENT
Start: 2020-07-29 | End: 2020-08-03 | Stop reason: HOSPADM

## 2020-07-29 RX ORDER — DEXTROSE MONOHYDRATE 100 MG/ML
INJECTION, SOLUTION INTRAVENOUS CONTINUOUS PRN
Status: DISCONTINUED | OUTPATIENT
Start: 2020-07-29 | End: 2020-08-03 | Stop reason: HOSPADM

## 2020-07-29 RX ORDER — OXYCODONE HCL 5 MG/5 ML
5-10 SOLUTION, ORAL ORAL 3 TIMES DAILY PRN
Status: DISCONTINUED | OUTPATIENT
Start: 2020-07-29 | End: 2020-07-31

## 2020-07-29 RX ADMIN — LIDOCAINE HYDROCHLORIDE 30 ML: 20 SOLUTION ORAL; TOPICAL at 04:24

## 2020-07-29 RX ADMIN — DIPHENHYDRAMINE HYDROCHLORIDE 25 MG: 25 CAPSULE ORAL at 04:28

## 2020-07-29 RX ADMIN — DIPHENHYDRAMINE HYDROCHLORIDE 25 MG: 25 CAPSULE ORAL at 16:34

## 2020-07-29 RX ADMIN — SUCRALFATE 1 G: 1 SUSPENSION ORAL at 09:10

## 2020-07-29 RX ADMIN — VANCOMYCIN HYDROCHLORIDE 1750 MG: 10 INJECTION, POWDER, LYOPHILIZED, FOR SOLUTION INTRAVENOUS at 04:34

## 2020-07-29 RX ADMIN — LORAZEPAM 1 MG: 1 TABLET ORAL at 22:25

## 2020-07-29 RX ADMIN — OXYCODONE HYDROCHLORIDE 10 MG: 5 SOLUTION ORAL at 06:06

## 2020-07-29 RX ADMIN — SUCRALFATE 1 G: 1 SUSPENSION ORAL at 16:34

## 2020-07-29 RX ADMIN — OXYCODONE HYDROCHLORIDE 10 MG: 5 SOLUTION ORAL at 13:51

## 2020-07-29 RX ADMIN — SUCRALFATE 1 G: 1 SUSPENSION ORAL at 23:59

## 2020-07-29 RX ADMIN — DEXTROAMPHETAMINE SACCHARATE, AMPHETAMINE ASPARTATE, DEXTROAMPHETAMINE SULFATE AND AMPHETAMINE SULFATE 20 MG: 2.5; 2.5; 2.5; 2.5 TABLET ORAL at 07:53

## 2020-07-29 RX ADMIN — PANTOPRAZOLE SODIUM 40 MG: 40 TABLET, DELAYED RELEASE ORAL at 07:53

## 2020-07-29 RX ADMIN — CARVEDILOL 6.25 MG: 6.25 TABLET, FILM COATED ORAL at 07:53

## 2020-07-29 RX ADMIN — ONDANSETRON 8 MG: 4 TABLET, ORALLY DISINTEGRATING ORAL at 23:59

## 2020-07-29 RX ADMIN — ONDANSETRON 8 MG: 4 TABLET, ORALLY DISINTEGRATING ORAL at 16:39

## 2020-07-29 RX ADMIN — NICOTINE POLACRILEX 2 MG: 2 LOZENGE ORAL at 17:12

## 2020-07-29 RX ADMIN — FENTANYL 1 PATCH: 25 PATCH, EXTENDED RELEASE TRANSDERMAL at 01:51

## 2020-07-29 RX ADMIN — LIDOCAINE HYDROCHLORIDE 30 ML: 20 SOLUTION ORAL; TOPICAL at 22:25

## 2020-07-29 RX ADMIN — LIDOCAINE HYDROCHLORIDE 30 ML: 20 SOLUTION ORAL; TOPICAL at 14:34

## 2020-07-29 RX ADMIN — OXYCODONE HYDROCHLORIDE 5 MG: 5 SOLUTION ORAL at 09:15

## 2020-07-29 RX ADMIN — VANCOMYCIN HYDROCHLORIDE 1750 MG: 10 INJECTION, POWDER, LYOPHILIZED, FOR SOLUTION INTRAVENOUS at 17:07

## 2020-07-29 RX ADMIN — ONDANSETRON 8 MG: 4 TABLET, ORALLY DISINTEGRATING ORAL at 07:59

## 2020-07-29 RX ADMIN — CARVEDILOL 6.25 MG: 6.25 TABLET, FILM COATED ORAL at 22:25

## 2020-07-29 RX ADMIN — OXYCODONE HYDROCHLORIDE 10 MG: 5 SOLUTION ORAL at 23:59

## 2020-07-29 RX ADMIN — SODIUM CHLORIDE: 9 INJECTION, SOLUTION INTRAVENOUS at 01:23

## 2020-07-29 ASSESSMENT — ACTIVITIES OF DAILY LIVING (ADL)
ADLS_ACUITY_SCORE: 11

## 2020-07-29 ASSESSMENT — MIFFLIN-ST. JEOR: SCORE: 1849.68

## 2020-07-29 NOTE — ED NOTES
[facility], [place]   ED Nurse to Floor Handoff     Parker Acevedo is a 49 year old male who speaks English and lives with a spouse,  in a home  They arrived in the ED by car from home    ED Chief Complaint: Fever    ED Dx;   Final diagnoses:   Staphylococcus epidermidis bacteremia   Bloodstream infection due to Cm catheter, initial encounter   On total parenteral nutrition   Status post bariatric surgery         Needed?: No    Allergies:   Allergies   Allergen Reactions     Bactrim [Sulfamethoxazole W/Trimethoprim] Rash     Penicillins Anaphylaxis     Please see Antimicrobial Management Team allergy assessment note 10/10/2018. Patient reported tolerating amoxicillin.     Doxycycline Rash     Vancomycin Rash     Rash after receiving vancomycin 3/28/16 (red man's?). Tolerated with slower infusion and diphenhydramine premed.   .  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     Complication of anesthesia      Difficulty swallowing      Gastric ulcer, unspecified as acute or chronic, without mention of hemorrhage, perforation, or obstruction      Gastro-oesophageal reflux disease      Head injury      Hiatal hernia      Other bladder disorder      Other chronic pain      PONV (postoperative nausea and vomiting)      Severe malnutrition (H)     TPN     Short gut syndrome      Tobacco abuse       Baseline Mental status: WDL  Current Mental Status changes: at basesline    Infection present or suspected this encounter: yes other blood stream infection  Sepsis suspected: No  Isolation type: No active isolations     Activity level - Baseline/Home:  Independent  Activity Level - Current:   Stand  with Assist    Bariatric equipment needed?: No    In the ED these meds were given:   Medications   vancomycin (VANCOCIN) 1,750 mg in sodium chloride 0.9 % 500 mL intermittent infusion (1,750 mg Intravenous New Bag 7/28/20 1724)   diphenhydrAMINE (BENADRYL) capsule 25 mg (has no administration in time range)   ondansetron (ZOFRAN) injection 4 mg (4 mg Intravenous Given 7/28/20 1709)   lidocaine (XYLOCAINE) 2 % 15 mL, alum & mag hydroxide-simethicone (MAALOX  ES) 15 mL GI Cocktail (30 mLs Oral Given 7/28/20 1707)   oxyCODONE (ROXICODONE) tablet 10 mg (10 mg Oral Given 7/28/20 1707)   diphenhydrAMINE (BENADRYL) injection 25 mg (25 mg Intravenous Given 7/28/20 1709)       Drips running?  Yes    Home pump  No    Current LDAs  CVC Double Lumen 04/14/20 Right Internal jugular (Active)   Number of days: 105       Gastrostomy/Enterostomy Gastrostomy LUQ 1 18 fr (Active)   Number of days: 2945       Labs results:   Labs Ordered and Resulted from Time of ED Arrival Up to the Time of Departure from the ED   CBC WITH PLATELETS DIFFERENTIAL - Abnormal; Notable for the following components:       Result Value    Hemoglobin 12.9 (*)     MCHC 31.0 (*)     RDW 16.0 (*)     All other components within normal limits   BASIC METABOLIC PANEL - Abnormal; Notable for the following components:    Chloride 111 (*)     All other components within normal limits   UA MACROSCOPIC WITH REFLEX TO MICRO AND CULTURE - Abnormal; Notable for the following components:    Blood Urine Trace (*)     All other components within normal limits   HEPATIC PANEL - Abnormal; Notable for the following components:    Albumin 3.3 (*)     All other components within normal limits   LACTIC ACID WHOLE BLOOD   CRP INFLAMMATION   ERYTHROCYTE SEDIMENTATION RATE AUTO   LACTIC ACID WHOLE BLOOD   BLOOD CULTURE   STREPTOCOCCUS A RAPID SCR W REFLX TO PCR   BLOOD CULTURE   GROUP A STREPTOCOCCUS PCR THROAT SWAB       Imaging Studies:   Recent Results (from the past 24 hour(s))    XR Chest Port 1 View    Narrative    Portable chest    INDICATION: Fever    COMPARISON: 11/2/2019    FINDINGS: Heart size appears normal. A new right IJ central venous  catheter is present with tip in the SVC. No pneumothorax. No areas of  consolidation. Bony structures appear grossly intact.      Impression    IMPRESSION: No acute consolidation.    ELENI RECINOS MD       Recent vital signs:   BP (!) 137/105   Pulse 62   Temp 99.8  F (37.7  C) (Oral)   Resp 16   Wt 94.4 kg (208 lb 1.8 oz)   SpO2 98%   BMI 29.03 kg/m      Maris Coma Scale Score: 15 (07/28/20 1800)       Cardiac Rhythm: Other, Did NOT assess  Pt needs tele? No  Skin/wound Issues: Cm R chest; Did NOT complete full skin assessment    Code Status: Full Code    Pain control: good    Nausea control: good    Abnormal labs/tests/findings requiring intervention:     Family present during ED course? No   Family Comments/Social Situation comments: NA    Tasks needing completion: continue abx    Fernandez Linn RN  [phone] [facility] ED

## 2020-07-29 NOTE — PHARMACY-ADMISSION MEDICATION HISTORY
Admission Medication History Completed by Pharmacy    See [facility] Admission Navigator for allergy information, preferred outpatient pharmacy, prior to admission medications and immunization status.     Medication History Sources:     Patient, Dispense Report    Changes made to PTA medication list (reason):    Added: None    Deleted:     Lidocaine 5% patch - 1-2 patches onto skin q 24 hours - 12 hours on, 12 hours off (per pt, old Rx)    Polyethylene glycol powder - 17g by mouth daily (per pt, old Rx)    Changed:     Sucralfate - 1 gram tablet >>> suspension (pt had trouble swallowing tablet)    Additional Information:    Patient was a reliable medication historian and was consistent with dispense report. He had a little bit of trouble recalling the last time he used some of his PRN medications, but otherwise took all of his daily medications today. Patient receives TPN for 14 hours at [facility].    Prior to Admission medications    Medication Sig Last Dose Taking? Auth Provider   acetaminophen (TYLENOL) 32 mg/mL liquid Take 15.65 mLs (500 mg) by mouth every 4 hours as needed for fever or mild pain 7/28/2020 at noon Yes Chuy Isaac MD   alteplase 2 mg/2 mL (in 10 mL syringe) Inject 1 mg into the vein as needed 7/28/2020 Yes Reported, Patient   amphetamine-dextroamphetamine (ADDERALL) 20 MG tablet TAKE ONE TABLET BY MOUTH ONCE DAILY 7/28/2020 at AM Yes Chuy Isaac MD   carvedilol (COREG) 6.25 MG tablet Take 6.25 mg by mouth 2 times daily (with meals) 7/28/2020 at AM Yes Unknown, Entered By History   cyanocobalamin (CYANOCOBALAMIN) 1000 MCG/ML injection Inject 1 mL (1,000 mcg) into the muscle every 30 days Past Week Yes Chuy Isaac MD   [facility] INFUSION MANAGED PATIENT Contact [facility] for patient specific medication information at [phone] on admission and discharge from the hospital.  Phones are answered 24 hours a day 7 days a week 365 days a year.    Providers -  "Choose \"CONTINUE HOME MED (no script)\" at discharge if patient treatment with home infusion will continue. 7/28/2020 Yes Ilsa Shine PA   lidocaine, alum & mag hydroxide-simethicone GI Cocktail 30 ml daily as needed for mouth/stomach pain. Past Week Yes Chuy Isaac MD   LORazepam (ATIVAN) 1 MG tablet TAKE 1 TABLET (1MG) BY MOUTH AS NEEDED FOR ANXIETY WITH TPN AND MEDICATIONS . JUST ONCE A DAY (30 TO LAST 30 DAYS) 7/27/2020 Yes Chuy Isaac MD   Multiple Vitamin (INFUVITE ADULT) injection Inject 10 mLs into the vein daily 7/28/2020 Yes Reported, Patient   Needle, Disp, (BD DISP NEEDLES) 27G X 1/2\" MISC 1 Device every 30 days Use for cyanocobalamin injection once q 30 days. 7/28/2020 Yes Chuy Isaac MD   ondansetron (ZOFRAN-ODT) 8 MG ODT tab DISSOLVE ONE TABLET ON TONGUE EVERY 8 HOURS AS NEEDED FOR NAUSEA 7/28/2020 Yes Chuy Isaac MD   order for DME Equipment being ordered: Bilateral knee high chronic venous insufficiency stockings--  mild-moderate pressures. 7/28/2020 Yes Chuy Isaac MD   order for DME Equipment being ordered: Urine Drainage bag, leg. 7/28/2020 Yes Chuy Isaac MD   oxyCODONE (ROXICODONE) 5 MG/5ML solution Take 5-10 mLs (5-10 mg) by mouth 2 times daily as needed for pain Max of 20mg/day. 30 day supply. May fill on 8/8/20 to start on 8/10/20 7/28/2020 Yes Patti Milligan MD   pantoprazole (PROTONIX) 40 MG EC tablet Take 1 tablet (40 mg) by mouth daily Past Month Yes Chuy Isaac MD   parenteral nutrition - PTA/DISCHARGE ORDER Patient receives 2050 mL TPN every 14 hours through PICC at [facility].  Yes Unknown, Entered By History   sodium chloride 0.9% infusion Inject 1,000-2,000 mLs into the vein as needed  7/28/2020 Yes Unknown, Entered By History   sucralfate (CARAFATE) 1 GM/10ML suspension Take 1 g by mouth 4 times daily as needed Past Week Yes Unknown, Entered By History   vitamin D2 (ERGOCALCIFEROL) 22828 units (1250 mcg) capsule Take 1 capsule " (50,000 Units) by mouth once a week 7/26/2020 Yes Chuy Isaac MD   albuterol (PROAIR HFA/PROVENTIL HFA/VENTOLIN HFA) 108 (90 Base) MCG/ACT inhaler Inhale 2 puffs into the lungs every 4 hours as needed for shortness of breath / dyspnea or wheezing More than a month  Chuy Isaac MD   fentaNYL (DURAGESIC) 25 mcg/hr 72 hr patch Place 1 patch onto the skin every 48 hours remove old patch.   May fill 8/1/20 and start 8/3/20 7/26/2020 at 10 PM  Patti Milligan MD   naloxone (NARCAN) 4 MG/0.1ML nasal spray Spray 1 spray (4 mg) into one nostril alternating nostrils as needed for opioid reversal every 2-3 minutes until assistance arrives Unknown  Patti Milligan MD       Date completed: 07/28/20    Medication history completed by: Jhonatan Frank

## 2020-07-29 NOTE — PROGRESS NOTES
Care Coordinator Progress Note    Admission Date/Time:  7/28/2020  Attending MD:  Jael Zimmerman MD    Data  Chart reviewed, discussed with interdisciplinary team.   Patient was admitted for:    Staphylococcus epidermidis bacteremia  Bloodstream infection due to Cm catheter, initial encounter  On total parenteral nutrition  Status post bariatric surgery.    Concerns with insurance coverage for discharge needs: None.  Current Living Situation: Patient lives with spouse.  Support System: Supportive and Involved  Services Involved: Home Infusion  Transportation at Discharge: Family or friend will provide  Transportation to Medical Appointments:   - Name of caregiver: Spouse, Rose will assist if needed.   Barriers to Discharge: Medical stability.      Assessment  Patient is a 49yr old female with a prior medical history of s/p bariatric surgery on chronic TPN, recurrent CLABSI, gastric ulcer, and short kelli syndrome who was admitted w/suspected bacteremia. Writer introduced self and role of RNCC. Patient lives locally w/his spouse, independently. Patient is open to [facility] for chronic TPN, they have been updated on his admission. Patient is currently on IV antibiotics, discharge plan and needs TBD, patient voiced no further questions at this time. RNCC will continue to follow for discharge planning.     [facility]  Phone  [phone]  Fax  [phone]    Plan  Anticipated Discharge Date:  TBD  Anticipated Discharge Plan:  TBD, likely home w/resumption of home infusion services.     Miri Cooper, RNCC, BSN    [facility]    Medicine Group  [address] [place] [id number]    [email or web address]    Office: [phone] Pager: [phone]  To contact the weekend RNCC, page [phone].

## 2020-07-29 NOTE — CONSULTS
[facility]     Patient:  Parker Acevedo   Date of birth 1970, Medical record number [id number]  Date of Visit:  07/29/2020  Date of Admission: 7/28/2020  Consult Requester:Jael Zimmerman MD          Assessment and Recommendations:   ASSESSMENT:  1. DONALDO AGUDELO  2. Celestino-en-Y gastric bypass, esophagojejunostomy c/b short gut syndrome and chronic malnutrition on TPN  3. History of recurrent polymicrobial bacteremia (sp within the last year: Corynebacterium, Fingoldia magna, Granulicatella adiacens, Staph epi, Strep salivarius) with several line replacements in the past. Hx of candidemia (1/2019).    DISCUSSION:   Parker Acevedo is a 49 year old male with history of Celestino-en-Y gastric bypass esophagojejunostomy c/b short gut syndrome and chronic malnutrition on TPN, with recurrent polymicrobial bacteremia and several central line replacements in the past, one episode of candidemia (1/2019) who is admitted after 2/2 blood cultures drawn from Cm on 7/26/20 grew Staph epi. Fevers to 101.3F at home occurring during or shortly after TPN infusion, associated with arthralgias, sweats, nausea, and fatigue. He notes mild erythema at Cm site and twinges of pain.     RECOMMENDATION:  1. Continue IV Vancomycin, plan for 7 days of therapy after Cm is removed for Staph epi bacteremia, as long as TTE is not concerning for vegetation  2. Would remove Cm  - 48 hour line holiday  3. Repeat BCx if patient spikes fever >100.4F or has additional positive blood cultures      Thank you for this consult. ID will continue to follow.     Patient was discussed with Dr. Buckner.     Elaine Roberts PA-C  Infectious Disease  Pager # [phone]  ______________________________________________________________    Consult Question: bacteremia 2/2 line infection - consider removing line + abx recs.  Admission Diagnosis: Status post bariatric surgery [Z98.84]  Staphylococcus  epidermidis bacteremia [R78.81, B95.7]  On total parenteral nutrition [Z78.9]  Bloodstream infection due to Cm catheter, initial encounter [T80.211A]         History of Present Illness:   Parker Acevedo is a 49 year old male with history of Celestino-en-Y gastric bypass esophagojejunostomy c/b short gut syndrome and chronic malnutrition on TPN, with recurrent polymicrobial bacteremia and several central line replacements in the past, one episode of candidemia (1/2019) who is admitted after 2/2 blood cultures drawn from Cm on 7/26/20 grew Staph epi. His last episode of bacteremia was in September 2019 with Granulicatella adiacens, Staph epi, and Strep salivarius treated with vancomycin and ceftriaxone. He has previously been counseled on the benefit of avoiding central line.    Mr. Acevedo reports that over the last week he has noticed increased nausea, abdominal pain, sore throat, fatigue, and sweats and joint that have occurred during fevers. Fever T-max was 101.3 with most being in 100's, fevers have happened most frequently either during or within 1-2 hours following TPN infusion. His last infusion was on either the 25th or 26th, he stopped using his TPN after the 101.3F temperature. Current line was placed in April 2020 and last CLABSI was in September of 2019. He reports difficulty flushing one of the ports over the last 1-2 weeks and states that home health nurse also had trouble with it. Line site has been irritated with a quarter sized are of erythema surrounding insertion point and occasional twinges of pain, there has been no discharge from site. Otherwise has been feeling well.            Review of Systems:   CONSTITUTIONAL:  +fevers, sweats, fatigue. No rigors.  EYES: negative for vision change  ENT:  +sore throat  RESPIRATORY:  negative for cough with sputum and dyspnea  CARDIOVASCULAR:  negative for chest pain, dyspnea  GASTROINTESTINAL:  +nausea (increased from baseline), abdominal pain  (increased from baseline. negative for vomiting, diarrhea and constipation  GENITOURINARY:  negative for dysuria  HEME:  No easy bruising  INTEGUMENT:  negative for rash and pruritus  NEURO:  Negative for headache, dizziness, weakness  MUSCULOSKELTAL: hand cramping several days ago         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     Complication of anesthesia      Difficulty swallowing      Gastric ulcer, unspecified as acute or chronic, without mention of hemorrhage, perforation, or obstruction      Gastro-oesophageal reflux disease      Head injury      Hiatal hernia      Other bladder disorder      Other chronic pain      PONV (postoperative nausea and vomiting)      Severe malnutrition (H)     TPN     Short gut syndrome      Tobacco abuse             Past Surgical History:     Past Surgical History:   Procedure Laterality Date     AMPUTATION       APPENDECTOMY       BACK SURGERY  11/3/2014    curve in the spine     BIOPSY LYMPH NODE CERVICAL N/A 2/20/2015    Procedure: BIOPSY LYMPH NODE CERVICAL;  Surgeon: Baron Scanlon MD;  Location: [facility]     C GASTRIC BYPASS,OBESE<100CM SHAYLEE-EN-Y  2002    lost 300 pounds     CHOLECYSTECTOMY       DISCECTOMY, FUSION CERVICAL ANTERIOR ONE LEVEL, COMBINED N/A 2/15/2017    Procedure: COMBINED DISCECTOMY, FUSION CERVICAL ANTERIOR ONE LEVEL;  Surgeon: Darren Campos MD;  Location: [facility]     ENDOSCOPIC INSERTION TUBE GASTROSTOMY  9/9/2013    Procedure: ENDOSCOPIC INSERTION TUBE GASTROSTOMY;;  Surgeon: Francis Vyas MD;  [facility]     ENDOSCOPIC ULTRASOUND UPPER GASTROINTESTINAL TRACT (GI)  4/29/2011    Procedure:ENDOSCOPIC ULTRASOUND UPPER GASTROINTESTINAL TRACT (GI);  Both Procedures done Conjointly; Surgeon:NEREIDA HOUSER; [facility]     ENDOSCOPIC ULTRASOUND UPPER GASTROINTESTINAL TRACT (GI)  9/9/2013    Procedure: ENDOSCOPIC ULTRASOUND UPPER GASTROINTESTINAL TRACT (GI);  Endoscopic Ultrasound Guide Gastrostomy Tube Placement  C-arm;  Surgeon: Noe Lizarraga MD;  [facility]     ENDOSCOPY  03/25/11    EGD, [facility]     ENDOSCOPY  08/04/09    Upper Endoscopy, [facility]     ENDOSCOPY  01/05/09    Upper Endoscopy, [facility]     ESOPHAGOSCOPY, GASTROSCOPY, DUODENOSCOPY (EGD), COMBINED  4/20/2011    Procedure:COMBINED ESOPHAGOSCOPY, GASTROSCOPY, DUODENOSCOPY (EGD); Surgeon:FRANCIS VYAS; [facility]     ESOPHAGOSCOPY, GASTROSCOPY, DUODENOSCOPY (EGD), COMBINED  6/15/2011    Procedure:COMBINED ESOPHAGOSCOPY, GASTROSCOPY, DUODENOSCOPY (EGD); Surgeon:FRANCIS VYAS; [facility]     ESOPHAGOSCOPY, GASTROSCOPY, DUODENOSCOPY (EGD), COMBINED  6/12/2013    Procedure: COMBINED ESOPHAGOSCOPY, GASTROSCOPY, DUODENOSCOPY (EGD);;  Surgeon: Francis Vyas MD;  [facility]     ESOPHAGOSCOPY, GASTROSCOPY, DUODENOSCOPY (EGD), COMBINED  11/22/2013    Procedure: COMBINED ESOPHAGOSCOPY, GASTROSCOPY, DUODENOSCOPY (EGD);;  Surgeon: Francis Vyas MD;  [facility]     ESOPHAGOSCOPY, GASTROSCOPY, DUODENOSCOPY (EGD), COMBINED  4/30/2014    Procedure: COMBINED ESOPHAGOSCOPY, GASTROSCOPY, DUODENOSCOPY (EGD);  Surgeon: Francis Vyas MD;  [facility]     ESOPHAGOSCOPY, GASTROSCOPY, DUODENOSCOPY (EGD), COMBINED N/A 2/20/2015    Procedure: COMBINED ESOPHAGOSCOPY, GASTROSCOPY, DUODENOSCOPY (EGD), BIOPSY SINGLE OR MULTIPLE;  Surgeon: Baron Scanlon MD;  Location:  OR     ESOPHAGOSCOPY, GASTROSCOPY, DUODENOSCOPY (EGD), COMBINED N/A 9/30/2015    Procedure: COMBINED ESOPHAGOSCOPY, GASTROSCOPY, DUODENOSCOPY (EGD);  Surgeon: Francis Vyas MD;  Location:  GI     ESOPHAGOSCOPY, GASTROSCOPY, DUODENOSCOPY (EGD),  COMBINED N/A 10/3/2019    Procedure: Upper Endoscopy;  Surgeon: Clif Morrow MD;  [facility]     GASTRECTOMY  6/22/2012    Procedure: GASTRECTOMY;  Open Approach, Excise Ulcers,Partial Gastrectomy, Esophagojejunostomy, Hiatal Hernia Repair, Extensive Lysis of Adhesions and Esaphagogastrodudenoscopy.;  Surgeon: Francis Vyas MD;  [facility]     GASTROJEJUNOSTOMY  08/26/09    Extensice enterolysis, partial resect. jejunum, part. resect gastric pouch, gastrojejunostomy anastomosis     HC ESOPH/GAS REFLUX TEST W NASAL IMPED ELECTRODE  8/5/2013    Procedure: ESOPHAGEAL IMPEDENCE FUNCTION TEST 1 HOUR OR LESS;  Surgeon: Halie Lang MD;  [facility]     HEAD & NECK SURGERY  2/15/2017    C5-C6     HERNIA REPAIR  2006    Umbilical hernia     HERNIORRHAPHY HIATAL  6/22/2012    Procedure: HERNIORRHAPHY HIATAL;;  Surgeon: Francis Vyas MD;  [facility]     HERNIORRHAPHY INGUINAL  11/22/2013    Procedure: HERNIORRHAPHY INGUINAL;;  Surgeon: Francis Vyas MD;  [facility]     INSERT PICC LINE Right 12/19/2019    Procedure: Picc Placement;  Surgeon: Per Dumont PA-C;  [facility]     INSERT PICC LINE Right 2/21/2020    Procedure: INSERTION, PICC;  Surgeon: Per Dumont PA-C;  [facility]     INSERT PORT VASCULAR ACCESS Right 12/19/2017    Procedure: INSERT PORT VASCULAR ACCESS;  Right Chest Port Placement ;  Surgeon: Lisandro Alejandro PA-C;  [facility]     INSERT PORT VASCULAR ACCESS Right 8/2/2018    Procedure: INSERT PORT VASCULAR ACCESS;  Place single lumen tunneled central venous access catheter;  Surgeon: Guy Jamil PA-C;  [facility]     IR CVC TUNNEL PLACEMENT > 5 YRS OF AGE  8/7/2019     IR CVC TUNNEL PLACEMENT > 5 YRS OF AGE  4/14/2020     IR CVC TUNNEL REMOVAL RIGHT  10/1/2019     IR FOLLOW UP VISIT OUTPATIENT  8/7/2019     IR PICC PLACEMENT > 5 YRS OF AGE  3/7/2019     IR PICC PLACEMENT > 5 YRS OF AGE   12/19/2019     IR PICC PLACEMENT > 5 YRS OF AGE  2/21/2020     LAPAROTOMY EXPLORATORY  11/22/2013    Procedure: LAPAROTOMY EXPLORATORY;  Exploratory Laparotomy, Upper Endoscopy, Left Inguinal Hernia Repair;  Surgeon: Francis Vyas MD;  [facility]     ORTHOPEDIC SURGERY       PICC INSERTION Right 03/16/2017    5fr DL BioFlo PICC, 42cm (3cm external) in the R medial brachial vein w/ tip in the SVC RA junction.     PICC INSERTION Left 09/23/2017    5fr DL BioFlo PICC, 45cm (1cm external) in the L basilic vein w/ tip in the SVC RA junction.     PICC INSERTION Right 05/16/2019    5Fr - 43cm, Medial brachial vein, low SVC     PICC INSERTION Right 10/02/2019    5Fr - 43cm (2cm external), basilic vein, low SVC     SHAYLEE EN Y BOWEL  2003     SOFT TISSUE SURGERY       THORACIC SURGERY       TONSILLECTOMY       TRANSESOPHAGEAL ECHOCARDIOGRAM INTRAOPERATIVE N/A 1/8/2019    Procedure: TRANSESOPHAGEAL ECHOCARDIOGRAM INTRAOPERATIVE;  Surgeon: GENERIC ANESTHESIA PROVIDER;  [facility]            Family History:   Reviewed and non-contributory.   Family History   Problem Relation Age of Onset     Gastrointestinal Disease Mother         Crohns disease     Anxiety Disorder Mother      Thyroid Disease Mother         Grave's disease     Cancer Father         ear cancer-skin cancer/melanoma     Breast Cancer Maternal Grandmother      Macular Degeneration Maternal Grandfather      Anxiety Disorder Sister      Diabetes Maternal Uncle      Breast Cancer Other      Hypertension No family hx of      Hyperlipidemia No family hx of      Cerebrovascular Disease No family hx of      Prostate Cancer No family hx of      Depression No family hx of      Anesthesia Reaction No family hx of      Asthma No family hx of      Osteoporosis No family hx of      Genetic Disorder No family hx of      Obesity No family hx of      Mental Illness No family hx of      Substance Abuse No family hx of      Glaucoma No family hx of              Social History:     Social History     Tobacco Use     Smoking status: Current Some Day Smoker     Packs/day: 0.25     Years: 3.00     Pack years: 0.75     Types: Cigarettes     Last attempt to quit: 2018     Years since quittin.0     Smokeless tobacco: Never Used     Tobacco comment: I use an e cig every now and than   Substance Use Topics     Alcohol use: No     Comment: quit 2002     History   Sexual Activity     Sexual activity: Yes     Partners: Female     Birth control/ protection: None     Comment: no protection            Current Medications:       amphetamine-dextroamphetamine  20 mg Oral Daily     carvedilol  6.25 mg Oral BID w/meals     diphenhydrAMINE  25 mg Oral Q12H     fentaNYL  25 mcg Transdermal Q48H     fentaNYL   Transdermal Q8H     pantoprazole  40 mg Oral Daily     polyethylene glycol  17 g Oral Daily     sodium chloride (PF)  3 mL Intracatheter Q8H     sucralfate  1 g Oral 4x Daily     vancomycin (VANCOCIN) IV  1,750 mg Intravenous Q12H            Allergies:     Allergies   Allergen Reactions     Bactrim [Sulfamethoxazole W/Trimethoprim] Rash     Penicillins Anaphylaxis     Please see Antimicrobial Management Team allergy assessment note 10/10/2018. Patient reported tolerating amoxicillin.     Doxycycline Rash     Vancomycin Rash     Rash after receiving vancomycin 3/28/16 (red man's?). Tolerated with slower infusion and diphenhydramine premed.            Physical Exam:   Vitals were reviewed  Patient Vitals for the past 24 hrs:   BP Temp Temp src Pulse Resp SpO2 Height Weight   20 0608 132/82 97.2  F (36.2  C) Oral 72 18 97 % -- --   20 2320 -- 97.8  F (36.6  C) Axillary -- -- -- -- --   20 2000 127/87 98.7  F (37.1  C) Oral 64 16 -- -- --   20 1520 (!) 146/81 99.8  F (37.7  C) Oral 75 18 99 % -- 94.4 kg (208 lb 1.8 oz)       Physical Examination:  GENERAL:  Awake, alert, oriented x4. Ambulating in room on arrival.  HEENT:  Head is normocephalic, atraumatic    EYES:  Eyes have anicteric sclerae without conjunctival injection, EOM intact.  ENT:  Oropharynx is moist without exudates or ulcers.   NECK:  Supple.   LUNGS:  Clear to auscultation bilateral without wheeze or rales.  CARDIOVASCULAR:  Regular rate and rhythm with no murmurs, gallops or rubs.  ABDOMEN:  Normal bowel sounds, soft, nontender, nondistended. +G-tube   SKIN:  Levido reticularis on bilateral shoulders and proximal upper extremities.  R chest Cm line in place, there is a pea-sized area of erythema surrounding the line, no discharge from line insertion site though there is some dried drainage on dressing pad.  NEUROLOGIC:  Grossly nonfocal. Active x4 extremities         Laboratory Data:     Inflammatory Markers    Recent Labs   Lab Test 07/28/20  1607 05/05/20  1218 04/28/20  1245 11/02/19  1853 10/30/19  1330 10/29/19  1210 07/10/19  1215 06/28/19  1345 05/14/19  1145  01/23/18  0845 01/20/18  1030  09/24/17  1900  06/28/17  2222 03/12/17  0351  11/01/16  1530   SED 10  --   --   --   --   --   --   --  13  --  10 11  --  31*  --  26* 17*  --  27*   CRP <2.9 <2.9 <2.9 <2.9 <2.9 <2.9 <2.9 <2.9  --    < > <2.9 5.4   < > 26.6*   < > 53.0* 3.4   < > 8.4*    < > = values in this interval not displayed.       Hematology Studies    Recent Labs   Lab Test 07/28/20  1607 07/14/20  1120 07/02/20  1200 06/16/20  1410 06/02/20  1450 05/19/20  1340  03/10/20  1245  11/02/19  1853 10/30/19  1330  10/17/19  1340   WBC 6.8 5.9 9.1 6.5 10.6 7.1   < > 5.7   < > 10.4 9.5   < > 6.7   ANEU 4.0 3.0 6.1 3.7 6.6 4.2   < > 3.1   < > 6.2 6.6  --  4.1   AEOS 0.1  --   --  0.2  --   --   --  0.2  --  0.1 0.1  --  0.2   HGB 12.9* 11.0* 12.3* 10.6* 12.7* 11.5*   < > 10.8*   < > 13.3 13.3   < > 11.6*   MCV 92 93 92 95 91 94   < > 96   < > 96 95   < > 96    190 186 127* 213 187   < > 170   < > 179 185   < > 173    < > = values in this interval not displayed.       Metabolic Studies     Recent Labs   Lab Test 07/28/20  2449  07/14/20  1120 07/02/20  1200 06/16/20  1410 06/02/20  1450    142 142 141 139   POTASSIUM 3.6 3.8 3.8 3.6 3.6   CHLORIDE 111* 108 110* 107 106   CO2 25 31 27 28 28   BUN 10 19 15 16 15   CR 0.68 0.82 0.66 0.78 0.70   GFRESTIMATED >90 >90 >90 >90 >90       Hepatic Studies    Recent Labs   Lab Test 07/28/20  1607 07/14/20  1120 07/02/20  1200 06/16/20  1410 06/02/20  1450 05/19/20  1340   BILITOTAL 0.4 0.3 0.5 0.3 0.4 0.3   ALKPHOS 69 73 72 70 82 70   ALBUMIN 3.3* 3.3* 3.4 3.0* 3.5 3.3*   AST 17 21 13 18 17 15   ALT 26 30 21 24 29 22       Microbiology:  Culture Micro   Date Value Ref Range Status   07/28/2020 No growth after 12 hours  Preliminary   07/28/2020 No growth after 12 hours  Preliminary   07/26/2020 (A)  Preliminary    Cultured on the 1st day of incubation:  Gram positive cocci in clusters     07/26/2020   Preliminary    Critical Value/Significant Value, preliminary result only, called to and read back by  Neha Quintana Pharmacist [facility] 7.27.20 1735.      07/26/2020 Culture in progress  Preliminary   07/26/2020 (A)  Preliminary    Cultured on the 1st day of incubation:  Staphylococcus epidermidis     07/26/2020   Preliminary    Critical Value/Significant Value, preliminary result only, called to and read back by  Yaa Jarquin RN [facility] 7.27.20 1616.      07/26/2020 Culture in progress  Preliminary   07/26/2020   Preliminary    (Note)  POSITIVE for STAPHYLOCOCCUS EPIDERMIDIS and POSITIVE for the mecA  gene (resistant to methicillin) by [employer] multiplex nucleic acid  test. Final identification and antimicrobial susceptibility testing  will be verified by standard methods.    Specimen tested with [employer] multiplex, gram-positive blood culture  nucleic acid test for the following targets: Staph aureus, Staph  epidermidis, Staph lugdunensis, other Staph species, Enterococcus  faecalis, Enterococcus faecium, Streptococcus species, S. agalactiae,  S. anginosus grp., S. pneumoniae, S. pyogenes,  Listeria sp., mecA  (methicillin resistance) and Melvin/B (vancomycin resistance).    Critical Value/Significant Value called to and read back by Chantale Jarrett RN. @2003. 7.27.20. BS.        04/03/2020 No growth  Final   04/03/2020 No growth  Final   11/02/2019 No growth  Final   11/02/2019 No growth  Final   10/30/2019 No growth  Final   10/30/2019 (A)  Final    Cultured on the 3rd day of incubation:  Corynebacterium species  Identification obtained by MALDI-TOF mass spectrometry research use only database. Test   characteristics determined and verified by the [facility]   ([facility]) [place], [place].     10/30/2019   Final    Critical Value/Significant Value, preliminary result only, called to and read back by   DR VYAS @ 1745. 11/1/2019 AV     10/30/2019 (A)  Final    Cultured on the 4th day of incubation:  Finegoldia magna (Peptostreptococcus otto)     10/30/2019   Final    Critical Value/Significant Value, preliminary result only, called to and read back by  DANI SANCHES RN 0200 11.3.19 NDP     10/30/2019   Final    (Note)  NEGATIVE for the following: Staphylococcus spp., Staph aureus, Staph  epidermidis, Staph lugdunensis, Streptococcus spp., Strep pneumoniae,  Strep pyogenes, Strep agalactiae, Strep anginosus group, Enterococcus  faecalis, Enterococcus faecium, and Listeria spp. by [employer]  multiplex nucleic acid test. Final identification and antimicrobial  susceptibility testing will be verified by standard methods.    Critical Value/Significant Value called to and read back by  Francis Vyas MD @ 1745. 11/1/19. AV.           NEGATIVE for the following: Staphylococcus spp., Staph aureus, Staph  epidermidis, Staph lugdunensis, Streptococcus spp., Strep pneumoniae,  Strep pyogenes, Strep agalactiae, Strep anginosus group, Enterococcus  faecalis, Enterococcus faecium, and Listeria spp. by [employer]  multiplex nucleic acid test. Final identification and antimicrobial  susceptibility  testing will be verified by standard methods.    Critical Value/Significant Value called to and read back by DANI SANCHES RN 0503 11.3.19 NDP     10/29/2019 No growth  Final   10/17/2019 No growth  Final   10/17/2019 No growth  Final   10/01/2019 No growth  Final   10/01/2019 No growth  Final   09/30/2019 No growth  Final   09/29/2019 No growth  Final   09/29/2019 No growth  Final   09/27/2019 (A)  Final    Cultured on the 1st day of incubation:  Granulicatella adiacens     09/27/2019   Final    Critical Value/Significant Value, preliminary result only, called to and read back by  Dr Vyas on 9.28.19 at 1632 bw     09/27/2019 (A)  Final    Cultured on the 1st day of incubation:  Staphylococcus epidermidis     09/27/2019   Final    Critical Value/Significant Value, preliminary result only, called to and read back by  paged to [facility] on 9.28.19 at 2327 bw     09/27/2019 (A)  Final    Cultured on the 1st day of incubation:  Streptococcus salivarius group     09/27/2019   Final    (Note)  POSITIVE for STREPTOCOCCUS SPECIES OTHER THAN pneumococcus, anginosus  group, S. pyogenes and S. agalactiae. Performed using [employer]  multiplex nucleic acid test. Final identification and antimicrobial  susceptibility testing will be verified by standard methods.    POSITIVE for STAPHYLOCOCCUS EPIDERMIDIS and POSITIVE for the mecA  gene (resistant to methicillin) by [employer] multiplex nucleic acid  test. Final identification and antimicrobial susceptibility testing  will be verified by standard methods.    Specimen tested with Verigene multiplex, gram-positive blood culture  nucleic acid test for the following targets: Staph aureus, Staph  epidermidis, Staph lugdunensis, other Staph species, Enterococcus  faecalis, Enterococcus faecium, Streptococcus species, S. agalactiae,  S. anginosus grp., S. pneumoniae, S. pyogenes, Listeria sp., mecA  (methicillin resistance) and Melvin/B (vancomycin resistance).    Critical  Value/Significant Value called to and read back by Estelle Hampton RN 9.27.19 @ 0227 AV     09/27/2019 No growth  Final   07/10/2019 No growth  Final   07/10/2019 No growth  Final   05/24/2019 No growth  Final   05/24/2019 No growth  Final   05/14/2019 No growth  Final   05/14/2019 No growth  Final   05/14/2019 No growth  Final   05/12/2019 No growth  Final   05/12/2019 No growth  Final   05/10/2019 (A)  Final    Cultured on the 2nd day of incubation:  Rothia mucilaginosa     05/10/2019   Final    Critical Value/Significant Value, preliminary result only, called to and read back by  Dr Clif Morrow at 10:20am 5/12/2019 ()     05/10/2019   Final    (Note)  NEGATIVE for the following: Staphylococcus spp., Staph aureus, Staph  epidermidis, Staph lugdunensis, Streptococcus spp., Strep pneumoniae,  Strep pyogenes, Strep agalactiae, Strep anginosus group, Enterococcus  faecalis, Enterococcus faecium, and Listeria spp. by [employer]  multiplex nucleic acid test. Final identification and antimicrobial  susceptibility testing will be verified by standard methods.    Critical Value/Significant Value called to and read back by Dr Clif Morrow at 13:15pm 5/12/2019 ()     01/25/2019 No growth  Final   01/25/2019 No growth  Final   01/07/2019 No growth  Final   01/07/2019 No growth  Final   01/06/2019 No growth  Final   01/06/2019 No growth  Final   01/04/2019 Single colony  Candida albicans   (A)  Final   01/04/2019   Final    <10,000 colonies/mL  urogenital deepa  Susceptibility testing not routinely done     01/04/2019 (A)  Final    Cultured on the 1st day of incubation:  Candida albicans     01/04/2019   Final    Critical Value/Significant Value, preliminary result only, called to and read back by   MARILOU HURST RN @1452 1/4/19. CT     01/04/2019 Susceptibility testing done on previous specimen  Final   01/04/2019 (A)  Final    Cultured on the 2nd day of incubation:  Candida albicans     01/04/2019   Final    Critical  Value/Significant Value, preliminary result only, called to and read back by  Valerie Su RN on 5b at 1047 on 1/5/19 ac.     01/04/2019 Susceptibility testing done on previous specimen  Final   01/02/2019 (A)  Final    Cultured on the 3rd day of incubation:  Candida albicans     01/02/2019   Final    Critical Value/Significant Value, preliminary result only, called to and read back by  Perla Kim RN on 1.4.19 at 1649. bw     01/02/2019 Susceptibility testing done on previous specimen  Final   01/02/2019 (A)  Final    Cultured on the 1st day of incubation:  Candida albicans     01/02/2019   Final    Critical Value/Significant Value, preliminary result only, called to and read back by  DAVID CRUZ, RN 2231 1.3.19 NDP     12/24/2018 No growth  Final   12/24/2018 No growth  Final   11/08/2018 No growth  Final   11/08/2018 No growth  Final   10/20/2018 No growth  Final   10/20/2018 No growth  Final   10/13/2018 (A)  Final    Cultured on the 4th day of incubation:  Achromobacter (Alcaligenes) denitrificans     10/13/2018   Final    Critical Value/Significant Value, preliminary result only, called to and read back by  Dr. Chantale Mendez @2145 on 10/18/18. .     10/13/2018 Susceptibility testing done on previous specimen  Final   10/13/2018 (A)  Final    Cultured on the 2nd day of incubation:  Achromobacter (Alcaligenes) denitrificans     10/13/2018   Final    Critical Value/Significant Value, preliminary result only, called to and read back by  Rigo Dickerson MD at 0848 10.16.18.DK     10/13/2018   Final    (Note)  NEGATIVE for the following organisms and resistance markers:  Acinetobacter sp., Citrobacter sp., Enterobacter sp., Proteus sp., E.  coli, K. pneumoniae/oxytoca, P. aeruginosa, CTX-M, KPC, NDM, VIM, IMP  and OXA by Pure Nootropicsigene multiplex nucleic acid test. Final identification  and antimicrobial susceptibility testing will be verified by standard  methods.    Critical Value/Significant Value called to and  read back by ;Dr. Rigo Dickerson [id number] @1117   10/16/2018 gd       10/13/2018 No growth  Final   10/13/2018 No growth  Final   10/13/2018 No growth  Final   10/12/2018 (A)  Final    Cultured on the 1st day of incubation:  Achromobacter (Alcaligenes) denitrificans  Susceptibility testing done on previous specimen     10/12/2018   Final    Critical Value/Significant Value, preliminary result only, called to and read back by  Jael Lanza at 0829 on 10/13/18 ac.     10/12/2018 No growth  Final   10/12/2018 No growth  Final   10/11/2018 (A)  Final    Cultured on the 1st day of incubation:  Achromobacter (Alcaligenes) denitrificans     10/11/2018   Final    Critical Value/Significant Value, preliminary result only, called to and read back by  Pinky Dobson RN [email or web address] on 10.12.18 at 1157 RD.      10/11/2018 No growth  Final   10/10/2018 No growth  Final   10/10/2018 (A)  Final    Cultured on the 1st day of incubation:  Streptococcus salivarius  Susceptibility testing done on previous specimen     10/10/2018   Final    Critical Value/Significant Value, preliminary result only, called to and read back by  Tanya De Leon RN UUER on 10.10.18 at 1028. RD.      10/10/2018 (A)  Final    Cultured on the 1st day of incubation:  Achromobacter (Alcaligenes) denitrificans  Susceptibility testing done on previous specimen     10/08/2018 (A)  Final    Cultured on the 1st day of incubation:  Streptococcus salivarius     10/08/2018   Final    Critical Value/Significant Value, preliminary result only, called to and read back by  Dr. Daly on 10.9.18 at 0800. bw     10/08/2018 (A)  Final    Cultured on the 1st day of incubation:  Achromobacter (Alcaligenes) denitrificans     10/08/2018   Final    Paged Dr. Esteban Daly x2 on 10/10/18 to notify of second organism.  No call back   10/08/2018   Final    Susceptibility testing requested by  Chantale Garza on Achromobacter for Ceftriaxone.10/12/18 at 1122.TV.     10/08/2018   Final     (Note)  NEGATIVE for the following: Staphylococcus spp., Staph aureus, Staph  epidermidis, Staph lugdunensis, Streptococcus spp., Strep pneumoniae,  Strep pyogenes, Strep agalactiae, Strep anginosus group, Enterococcus  faecalis, Enterococcus faecium, and Listeria spp. by Verigene  multiplex nucleic acid test. Final identification and antimicrobial  susceptibility testing will be verified by standard methods.    Critical Value/Significant Value called to and read back by Nafisa Escalante RN ([facility]) at 1025 on 10.9.18 RD.        10/08/2018 (A)  Final    Cultured on the 1st day of incubation:  Staphylococcus hominis  This isolate DOES NOT demonstrate inducible clindamycin resistance in vitro. Clindamycin   is susceptible and could be used when indicated, however, erythromycin is resistant and   should not be used.     10/08/2018   Final    Critical Value/Significant Value, preliminary result only, called to and read back by  Nafisa Escalante RN on 10.9.18 at 1407. bw     10/08/2018 (A)  Final    Cultured on the 2nd day of incubation:  Gram positive bacilli resembling diphtheroids  No further identification     10/08/2018   Final    Critical Value/Significant Value, preliminary result only, called to and read back by  Jessica NEIL on 10.10.18 at 1347. RD.     10/08/2018   Final    (Note)  POSITIVE for Staphylococci other than S.aureus, S.epidermidis and  S.lugdunensis, by Verigene multiplex nucleic acid test.  Coagulase-negative staphylococci are the most common venipuncture or  collection associated skin CONTAMINANTS grown in blood cultures.  Final identification and antimicrobial susceptibility testing will be  verified by standard methods.    Specimen tested with Verigene multiplex, gram-positive blood culture  nucleic acid test for the following targets: Staph aureus, Staph  epidermidis, Staph lugdunensis, other Staph species, Enterococcus  faecalis, Enterococcus faecium, Streptococcus species, S.  agalactiae,  S. anginosus grp., S. pneumoniae, S. pyogenes, Listeria sp., mecA  (methicillin resistance) and Melvin/B (vancomycin resistance).    Critical Value/Significant Value called to and read back by Nafisa Aponte RN @ 2142 10/9/18 CS      NEGATIVE for the following: Staphylococcus spp., Staph aureus, Staph  epidermidis, Staph lugdunensis, Streptococcus spp., Strep pneumoniae,  Strep pyogenes, Strep agalactiae, Strep anginosus group, Enterococcus  faecalis, Enterococcus faecium, and Listeria spp. by Babelgum  multiplex nucleic acid test. Final identification and antimicrobial  susceptibility testing will be verified by standard methods.    Critical Value/Significant Value called to and read back by Hany Tyler RN, @1947 10/10/18..         Urine Studies    Recent Labs   Lab Test 07/28/20  1905 11/03/19  0025 10/04/19  1344 09/29/19  1625 05/24/19  2316   LEUKEST Negative Negative Trace* Negative Negative   WBCU <1 <1 1 0 1       Vancomycin Levels    Recent Labs   Lab Test 10/07/19  1952 10/04/19  0455 10/01/19  0819   VANCOMYCIN 9.1 18.8 16.9       Hepatitis B Testing   Recent Labs   Lab Test 09/20/17  0549   HEPBANG Nonreactive     Hepatitis C Testing     Hepatitis C Antibody   Date Value Ref Range Status   09/20/2017 Nonreactive NR^Nonreactive Final     Comment:     Assay performance characteristics have not been established for newborns,   infants, and children       Respiratory Virus Testing    No results found for: RS, FLUAG          Imaging:     CXR (7/28/20)  FINDINGS: Heart size appears normal. A new right IJ central venous  catheter is present with tip in the SVC. No pneumothorax. No areas of  consolidation. Bony structures appear grossly intact.

## 2020-07-29 NOTE — PROGRESS NOTES
[facility] Progress Note - Hospitalist Service, Gold 8       Date of Admission:  7/28/2020  Assessment & Plan    Mr. Acevedo is a 50yo M with PMHx notable for s/p bariatric surgery on chronic TPN, recurrent CLABSI, gastric ulcer, short gut syndrome, here for 1 week of fever, chills, and night sweats, concerning for central line infection and bacteremia.      Changes Today:  - Blood cultures 7/26 growing MRSE from Cm, 7/28 cultures NGTD  - ID consulted, recommend removal of Cm port, IR consult placed, will likely do in am 7/30  - Will likely require 48 hours line holiday before replacement of new central access  - TTE without vegetations on 7/29   - Continue IV Vanc per ID recs, tentative plan for 7 days therapy per ID  - Nicotine lozenges prn, GI cocktail TID per patient request  - Increase prn Oxycodone to TID (home dose is BID) given acute pain while hospitalized, will discharge on PTA home opioid regimen       # MRSE Bacteremia  # Hx recurrent polymicrobial bacteremia  - Blood cultures 7/26 growing MRSE from Cm, 7/28 cultures NGTD  - ID consulted, recommend removal of Cm port, IR consult placed, will likely do in am 7/30  - Will likely require 48 hours line holiday before replacement of new central access  - TTE without vegetations on 7/29   - Continue IV Vanc per ID recs, tentative plan for 7 days therapy per ID  - Diet advanced, IVF discontinued  - Repeat cultures if T >100.4 or if more recent blood cultures become positive      # Hx Celestino-en-Y gastric bypass, esophagojejunostomy c/b short gut syndrome and chronic malnutrition  # Chronic Abdominal Pain:  Patient on TPN, typically through Cm. RD consulted, TPN orders initiated. Will need a line holiday after Cm removed. Per patient has limited access, will likely need to discuss with IR what kind of access to replace after holiday complete  - RD consulted, appreciate recs, TPN  "ordered, can potentially run peripherally until central access replaced, will discuss with RD tomorros 7/30   - PTA GI cocktail TID prn ordered  - PPI daily  - Continue PTA Carafate  - Continue PTA bowel regimen   - Increase prn Oxycodone to TID (home dose is BID) given acute pain while hospitalized, will discharge on PTA home opioid regimen  - Continue PTA Fentanyl patch     Chronic medical problems  #COPD  -Continue home albuterol PRN     #ADHD  -Continue home adderall 20mg Daily     #GERD  -Continue home pantoprazole 40mg daily     #Cardiomyopathy with reduced EF  -Coreg 6.25mg BID       Diet: NPO for Medical/Clinical Reasons Except for: Meds, Ice Chips  parenteral nutrition - ADULT compounded formula CYCLE    DVT Prophylaxis: Low Risk/Ambulatory with no VTE prophylaxis indicated  Sanchez Catheter: not present  Code Status: Full Code           Disposition Plan   Expected discharge: 2 - 3 days, recommended to prior living arrangement once antibiotic plan established and SIRS/Sepsis treated.  Entered: Jael Zimmerman MD 07/29/2020, 6:26 PM       The patient's care was discussed with the Bedside Nurse and Patient.    Jael Zimmerman MD  Hospitalist Service, [address], [place]  Pager: [phone]  Please see sticky note for cross cover information  ______________________________________________________________________    Interval History   No acute events overnight. THinks he feels better. Fewer chills and night sweats last night. Thinks chronic abdominal pain is a bit worse than normal, requesting GI cocktails to be increased. Also is not able to go outside and smoke so feels anxious from that, requesting nicotine lozenges. Overall still feels \"not right\" but better than before. Denies cp, SOB.    Data reviewed today: I reviewed all medications, new labs and imaging results over the last 24 hours.    Physical Exam   Vital Signs: Temp: 98.1  F (36.7  C) Temp src: Oral BP: 112/71 " Pulse: 75   Resp: 18 SpO2: 96 % O2 Device: None (Room air)    Weight: 210 lbs 0 oz  GEN: pleasant, no acute distress  HEENT: no icterus, MMM  CV: RRR, normal s1,s2, no murmurs/rubs no heave. JVP not visible, azevedo in place, slight erythema around site, no unduration or purulence   CHEST: clear to ausculation bilaterally, no rales or wheezing  ABD:  normal active bowel sounds  -TTP epigastric region with voluntary guarding  -1cm erythema noted at central line site  EXTR: DP 2+ bilaterally . No clubbing, cyanosis or edema.   NEURO: alert oriented, speech fluent/appropriate, motor grossly nonfocal    Data

## 2020-07-29 NOTE — PLAN OF CARE
Patient continues to c/o abd pain with some relief with PRN Oxycodone and heating pad. Up independently in room. Reports voiding adequately and having BMs. Right internal jugular intact and Jim tube given to patient per request to vent tube. Blood cultures obtained by lab again as well. Continue with plan of care and notify MD of any changes.

## 2020-07-29 NOTE — CONSULTS
INTERVENTIONAL RADIOLOGY CONSULT    Parker Acevedo is a 49 year old male well known to IR for vascular access devices for TPN use now with staph epi bacteremia so IR has been consulted for double lumen tunneled line removal. This was placed on 4/14/20, 6 Fr 28 cm.    Patient is on IR schedule tomorrow (COVID not back yet) for a tunneled line removal with catheter tip culture. INR pending. No NPO required. Consent will be done prior to procedure.     Discussed with Jael Zimmerman MD medicine.    Jhonatan Alejandro PA-C  Interventional Radiology  [phone]

## 2020-07-29 NOTE — PROGRESS NOTES
CLINICAL NUTRITION SERVICES - ASSESSMENT NOTE     Nutrition Prescription    RECOMMENDATIONS FOR MDs/PROVIDERS TO ORDER:  1. Ongoing Lyte Monitor with TPN start and advancement , replace as needed   2. Avoid IV dextrose from MIVF with start of TPN, decrease or d/c  IVF with TPN start.     Malnutrition Status:    Unable to determine due to unable to perform all aspects of NFPE    Recommendations already ordered by Registered Dietitian (RD):  - Once PICC access appropriate to use, recommend resuming pt's home PN regimen as follows: TPN with goal vol of 1000 ml/day over 14 hours with  150 gm Dex daily, 100 gm AA daily, and 100 ml 20% IV lipids 4 days /week.    --(Regimen provides average daily 1024 kcals (13 kcals/kg + pending PO), 1.3 gm PRO/kg, GIR 2.8 with per peak infusion and 11% kcals from Fat. Dosing wt = 76.4 kg (TPN dosing wt ).     Future/Additional Recommendations:  IF plan to pull Central line, and consulted to start PPN support, consider the following:      Clinimix 5/4.25% @ 1200 ml daily ( @ 50 ml/hr) with 100 ml daily 20% lipids.  Goal clinimix Provides: 60 gm dextrose (204 kcal), 51 gm protein (204 kcal) and 200 kcal from lipids = 608 kcal/day (8 kcal /kg) and 51 gm protein (0.7 gm/kg ) + pending PO intake.          REASON FOR ASSESSMENT  Parker Acevedo is a/an 49 year old male assessed by the dietitian for Pharmacy/Nutrition to Start and Manage PN    Chart reviewed:  Admitted for bacteremia, Concerning for central line infection   PMH: Notable for s/p bariatric surgery on chronic TPN, recurrent CLABSI, gastric ulcer, short gut syndrome, Cardiomyopathy with reduced EF.    --Here for 1 week of fever, chills, and night sweats    NUTRITION HISTORY  Obtained from chart review:   --On chronic TPN at home for the past 4-5 years due to history of Gastric bypass. Has persistent Nausea.     Obtained home TPN regimen from [facility]:  Two bags (PN + lipids M/W/F) and (PN without lipids T/TH/S/Alicea)    Bag # 1 (PN  "with lipids M/W/F):  Dosing wt: 76.4 kg, Volume 1100 ml, 14 ours cycle, 100 ml overfield   Dextrose 150 gm,  gms with 125 ml 20% intralipid's  MTE-5 1ml/day, infuvite 10 ml daily     - Goal TPN Provides: 1160 kcals/day (15 kcal/kg/day + Pending PO calories ), 1.3 gm PRO/kg/day, 150 gm CHO/day, GIR: 2.74  mg/kg/min peak infusion and 22% of kcals from IV lipids.     Bag #2 (PN without Lipids T///)  Dosing wt: 76.4 kg, Volume 1000 ml, 14 hour cycle + 100 ml over field   Dextrose 150 gm,  gms, No lipids   - Goal TPN Provides: 910 kcals/day (12 kcal/kg/day + Pending PO calories ), 1.3 gm PRO/kg/day, 150 gm CHO/day, GIR: 2.74  mg/kg/min peak infusion and no fat contribution.     PO: PO food as tolerated and for comfort, often not able to eat much per patient. Feels that he is getting wt on current TPN. Has ongoing nausea.     CURRENT NUTRITION ORDERS  Diet: NPO  Intake/Tolerance: Kept NPO since admit last night   NS @ 100 ml/hr    LABS  K+: 3.6, Mag: N/A, Phos: N/A  T (on ) and normal, Alk phos: 69, ALT: 26, AST:17 - all within normal range  BUN: 10, Cr: 0,68    MEDICATIONS  Medications reviewed    ANTHROPOMETRICS  Height: 180.3 cm (5' 11\")  Most Recent Weight: 94.6 kg (208 lb 8.9 oz) admit wt on 20   IBW: 78.2 kg ( 121% IBW)  BMI: 29.09 kg/m2 Overweight BMI 25-29.9  Weight History:   Wt Readings from Last 10 Encounters:   20 94.6 kg (208 lb 8.9 oz)   20 82.1 kg (181 lb)   20 97.3 kg (214 lb 9.6 oz)   20 90.3 kg (199 lb)   20 78.9 kg (174 lb)   19 86.6 kg (191 lb)   19 78.9 kg (174 lb)   19 90.7 kg (200 lb)   19 89.8 kg (198 lb)   19 87.1 kg (192 lb)       Dosing Weight: Will continue with TPN dosing wt of 76 kg in the lite of recent wt gain.     ASSESSED NUTRITION NEEDS  Estimated Energy Needs: 3262-9169 kcals/day (20 - 25 kcals/kg)  Justification: lower end with TPN maintenance   Estimated Protein Needs:   grams protein/day " (1.2 - 1.5 grams of pro/kg)  Justification: Repletion  Estimated Fluid Needs: Justification: Per provider pending fluid status, History of Cardiomyopathy with reduced EF.      PHYSICAL FINDINGS  --Information obtained from chart review and speaking with patient via phone: ---Unable to obtain in-person nutrition history or nutrition focused physical assessment (NFPA) from patient as the number of staff going into rooms is restricted to limit exposure and to minimize use of PPE.    MALNUTRITION  % Intake: NPO 1-2 days, No TPN last night   % Weight Loss: None noted  Subcutaneous Fat Loss: Unable to assess  Muscle Loss: Unable to assess  Fluid Accumulation/Edema: None noted  Malnutrition Diagnosis: Unable to determine due to unable to perform all aspects of NFPE    NUTRITION DIAGNOSIS  Inadequate parenteral nutrition infusion related to Bacteremia as evidenced by No TPN given last night and pending ability to restart TPN tonight     INTERVENTIONS  Implementation  Nutrition Education: discussed role of RD in nutrition POC  Parenteral Nutrition/IV Fluids - Sent change order      Goals  Total avg nutritional intake to meet a minimum of 20 kcal/kg and 1.2 gm PRO/kg daily (per dosing wt 76 kg).     Monitoring/Evaluation  Progress toward goals will be monitored and evaluated per protocol.    Gautam Mckeon RD/BLANKA  Pager [phone]

## 2020-07-29 NOTE — PLAN OF CARE
Pt admitted to  at 2323 from the ED for Bacterimia. Pt had fever, night seats, chills, and sore throat at home. Pt on chronic TP at home due to a gastric bypass. Pt has a drain on left upper abdomen for drainage.  Vital signs stable on room air. Pt is alert and oriented. Pt has Pain in abdomen, Pt has fentynl patch on and had PRN oxycodone. Pt denies nausea and SOB. Lung sounds clear. Cardiac WDL. Bowel Sounds present. Pt voiding adequately, clear yellow urine. Pt up independent with transfers. Skin clean, dry, and intact. 2 RN skin assessment done. PIV infusing vancomycin.  Pt rounded on hourly. Will continue to monitor.

## 2020-07-29 NOTE — PLAN OF CARE
Assumed cares from [id number]. Vital signs stable on RA. Up self in room. PRN oxycodone given for pain. Order is for TID. Pt has had 3 doses for the day. IV fluids stopped. NPO. GI cocktail given. Plan for TPN to start tonight. Will continue to monitor.

## 2020-07-30 ENCOUNTER — APPOINTMENT (OUTPATIENT)
Dept: INTERVENTIONAL RADIOLOGY/VASCULAR | Facility: CLINIC | Age: 50
DRG: 315 | End: 2020-07-30
Attending: PHYSICIAN ASSISTANT
Payer: COMMERCIAL

## 2020-07-30 LAB
ALBUMIN SERPL-MCNC: 3.2 G/DL (ref 3.4–5)
ALP SERPL-CCNC: 68 U/L (ref 40–150)
ALT SERPL W P-5'-P-CCNC: 24 U/L (ref 0–70)
ANION GAP SERPL CALCULATED.3IONS-SCNC: 4 MMOL/L (ref 3–14)
AST SERPL W P-5'-P-CCNC: 13 U/L (ref 0–45)
BACTERIA SPEC CULT: ABNORMAL
BILIRUB DIRECT SERPL-MCNC: 0.1 MG/DL (ref 0–0.2)
BILIRUB SERPL-MCNC: 0.5 MG/DL (ref 0.2–1.3)
BUN SERPL-MCNC: 11 MG/DL (ref 7–30)
CALCIUM SERPL-MCNC: 8 MG/DL (ref 8.5–10.1)
CHLORIDE SERPL-SCNC: 109 MMOL/L (ref 94–109)
CO2 SERPL-SCNC: 27 MMOL/L (ref 20–32)
CREAT SERPL-MCNC: 0.69 MG/DL (ref 0.66–1.25)
GFR SERPL CREATININE-BSD FRML MDRD: >90 ML/MIN/{1.73_M2}
GLUCOSE BLDC GLUCOMTR-MCNC: 103 MG/DL (ref 70–99)
GLUCOSE BLDC GLUCOMTR-MCNC: 86 MG/DL (ref 70–99)
GLUCOSE BLDC GLUCOMTR-MCNC: 89 MG/DL (ref 70–99)
GLUCOSE BLDC GLUCOMTR-MCNC: 98 MG/DL (ref 70–99)
GLUCOSE SERPL-MCNC: 86 MG/DL (ref 70–99)
INR PPP: 0.72 (ref 0.86–1.14)
MAGNESIUM SERPL-MCNC: 2.3 MG/DL (ref 1.6–2.3)
PHOSPHATE SERPL-MCNC: 3.4 MG/DL (ref 2.5–4.5)
POTASSIUM SERPL-SCNC: 3.5 MMOL/L (ref 3.4–5.3)
PREALB SERPL IA-MCNC: 28 MG/DL (ref 15–45)
PROT SERPL-MCNC: 6.3 G/DL (ref 6.8–8.8)
SODIUM SERPL-SCNC: 139 MMOL/L (ref 133–144)
SPECIMEN SOURCE: ABNORMAL
SPECIMEN SOURCE: ABNORMAL
VANCOMYCIN SERPL-MCNC: 9.7 MG/L

## 2020-07-30 PROCEDURE — 25000128 H RX IP 250 OP 636: Performed by: STUDENT IN AN ORGANIZED HEALTH CARE EDUCATION/TRAINING PROGRAM

## 2020-07-30 PROCEDURE — 80202 ASSAY OF VANCOMYCIN: CPT | Performed by: INTERNAL MEDICINE

## 2020-07-30 PROCEDURE — 36589 REMOVAL TUNNELED CV CATH: CPT | Mod: RT

## 2020-07-30 PROCEDURE — 83735 ASSAY OF MAGNESIUM: CPT | Performed by: INTERNAL MEDICINE

## 2020-07-30 PROCEDURE — 82248 BILIRUBIN DIRECT: CPT | Performed by: INTERNAL MEDICINE

## 2020-07-30 PROCEDURE — 00000146 ZZHCL STATISTIC GLUCOSE BY METER IP

## 2020-07-30 PROCEDURE — 84100 ASSAY OF PHOSPHORUS: CPT | Performed by: INTERNAL MEDICINE

## 2020-07-30 PROCEDURE — 40000141 ZZH STATISTIC PERIPHERAL IV START W/O US GUIDANCE

## 2020-07-30 PROCEDURE — 12000001 ZZH R&B MED SURG/OB UMMC

## 2020-07-30 PROCEDURE — 25000132 ZZH RX MED GY IP 250 OP 250 PS 637: Performed by: STUDENT IN AN ORGANIZED HEALTH CARE EDUCATION/TRAINING PROGRAM

## 2020-07-30 PROCEDURE — 87077 CULTURE AEROBIC IDENTIFY: CPT | Performed by: INTERNAL MEDICINE

## 2020-07-30 PROCEDURE — 25000125 ZZHC RX 250: Performed by: INTERNAL MEDICINE

## 2020-07-30 PROCEDURE — 80053 COMPREHEN METABOLIC PANEL: CPT | Performed by: INTERNAL MEDICINE

## 2020-07-30 PROCEDURE — 84134 ASSAY OF PREALBUMIN: CPT | Performed by: INTERNAL MEDICINE

## 2020-07-30 PROCEDURE — 87070 CULTURE OTHR SPECIMN AEROBIC: CPT | Performed by: INTERNAL MEDICINE

## 2020-07-30 PROCEDURE — 25000128 H RX IP 250 OP 636: Performed by: INTERNAL MEDICINE

## 2020-07-30 PROCEDURE — 99233 SBSQ HOSP IP/OBS HIGH 50: CPT | Performed by: INTERNAL MEDICINE

## 2020-07-30 PROCEDURE — 40000802 ZZH SITE CHECK

## 2020-07-30 PROCEDURE — 85610 PROTHROMBIN TIME: CPT | Performed by: INTERNAL MEDICINE

## 2020-07-30 PROCEDURE — 25800030 ZZH RX IP 258 OP 636: Performed by: INTERNAL MEDICINE

## 2020-07-30 PROCEDURE — 36592 COLLECT BLOOD FROM PICC: CPT | Performed by: INTERNAL MEDICINE

## 2020-07-30 PROCEDURE — 02PYX3Z REMOVAL OF INFUSION DEVICE FROM GREAT VESSEL, EXTERNAL APPROACH: ICD-10-PCS | Performed by: PHYSICIAN ASSISTANT

## 2020-07-30 PROCEDURE — 25000132 ZZH RX MED GY IP 250 OP 250 PS 637: Performed by: INTERNAL MEDICINE

## 2020-07-30 PROCEDURE — 25000132 ZZH RX MED GY IP 250 OP 250 PS 637: Performed by: HOSPITALIST

## 2020-07-30 PROCEDURE — 40000556 ZZH STATISTIC PERIPHERAL IV START W US GUIDANCE

## 2020-07-30 RX ORDER — DIPHENHYDRAMINE HCL 25 MG
25 CAPSULE ORAL EVERY 8 HOURS
Status: DISCONTINUED | OUTPATIENT
Start: 2020-07-30 | End: 2020-08-03 | Stop reason: HOSPADM

## 2020-07-30 RX ORDER — DIPHENHYDRAMINE HCL 25 MG
25 CAPSULE ORAL EVERY 8 HOURS
Status: DISCONTINUED | OUTPATIENT
Start: 2020-07-30 | End: 2020-07-30

## 2020-07-30 RX ORDER — NICOTINE POLACRILEX 4 MG
15-30 LOZENGE BUCCAL
Status: DISCONTINUED | OUTPATIENT
Start: 2020-07-30 | End: 2020-08-03 | Stop reason: HOSPADM

## 2020-07-30 RX ORDER — DEXTROSE MONOHYDRATE 25 G/50ML
25-50 INJECTION, SOLUTION INTRAVENOUS
Status: DISCONTINUED | OUTPATIENT
Start: 2020-07-30 | End: 2020-08-03 | Stop reason: HOSPADM

## 2020-07-30 RX ORDER — NICOTINE 21 MG/24HR
1 PATCH, TRANSDERMAL 24 HOURS TRANSDERMAL DAILY
Status: DISCONTINUED | OUTPATIENT
Start: 2020-07-30 | End: 2020-08-03 | Stop reason: HOSPADM

## 2020-07-30 RX ADMIN — SUCRALFATE 1 G: 1 SUSPENSION ORAL at 08:26

## 2020-07-30 RX ADMIN — VANCOMYCIN HYDROCHLORIDE 1250 MG: 10 INJECTION, POWDER, LYOPHILIZED, FOR SOLUTION INTRAVENOUS at 13:05

## 2020-07-30 RX ADMIN — VANCOMYCIN HYDROCHLORIDE 1250 MG: 10 INJECTION, POWDER, LYOPHILIZED, FOR SOLUTION INTRAVENOUS at 21:41

## 2020-07-30 RX ADMIN — FENTANYL 1 PATCH: 25 PATCH, EXTENDED RELEASE TRANSDERMAL at 21:42

## 2020-07-30 RX ADMIN — OXYCODONE HYDROCHLORIDE 10 MG: 5 SOLUTION ORAL at 09:23

## 2020-07-30 RX ADMIN — DIPHENHYDRAMINE HYDROCHLORIDE 25 MG: 25 CAPSULE ORAL at 20:11

## 2020-07-30 RX ADMIN — OXYCODONE HYDROCHLORIDE 10 MG: 5 SOLUTION ORAL at 16:00

## 2020-07-30 RX ADMIN — LIDOCAINE HYDROCHLORIDE 30 ML: 20 SOLUTION ORAL; TOPICAL at 16:01

## 2020-07-30 RX ADMIN — SUCRALFATE 1 G: 1 SUSPENSION ORAL at 21:41

## 2020-07-30 RX ADMIN — LORAZEPAM 1 MG: 1 TABLET ORAL at 20:11

## 2020-07-30 RX ADMIN — NICOTINE 1 PATCH: 21 PATCH, EXTENDED RELEASE TRANSDERMAL at 08:26

## 2020-07-30 RX ADMIN — VANCOMYCIN HYDROCHLORIDE 1750 MG: 10 INJECTION, POWDER, LYOPHILIZED, FOR SOLUTION INTRAVENOUS at 05:16

## 2020-07-30 RX ADMIN — DIPHENHYDRAMINE HYDROCHLORIDE 25 MG: 25 CAPSULE ORAL at 04:44

## 2020-07-30 RX ADMIN — PANTOPRAZOLE SODIUM 40 MG: 40 TABLET, DELAYED RELEASE ORAL at 08:25

## 2020-07-30 RX ADMIN — ONDANSETRON 8 MG: 4 TABLET, ORALLY DISINTEGRATING ORAL at 09:29

## 2020-07-30 RX ADMIN — ASCORBIC ACID, VITAMIN A PALMITATE, CHOLECALCIFEROL, THIAMINE HYDROCHLORIDE, RIBOFLAVIN-5 PHOSPHATE SODIUM, PYRIDOXINE HYDROCHLORIDE, NIACINAMIDE, DEXPANTHENOL, ALPHA-TOCOPHEROL ACETATE, VITAMIN K1, FOLIC ACID, BIOTIN, CYANOCOBALAMIN: 200; 3300; 200; 6; 3.6; 6; 40; 15; 10; 150; 600; 60; 5 INJECTION, SOLUTION INTRAVENOUS at 17:55

## 2020-07-30 RX ADMIN — SUCRALFATE 1 G: 1 SUSPENSION ORAL at 16:00

## 2020-07-30 RX ADMIN — DIPHENHYDRAMINE HYDROCHLORIDE 25 MG: 25 CAPSULE ORAL at 12:30

## 2020-07-30 RX ADMIN — SUCRALFATE 1 G: 1 SUSPENSION ORAL at 12:30

## 2020-07-30 RX ADMIN — LIDOCAINE HYDROCHLORIDE 30 ML: 20 SOLUTION ORAL; TOPICAL at 04:37

## 2020-07-30 RX ADMIN — CARVEDILOL 6.25 MG: 6.25 TABLET, FILM COATED ORAL at 17:53

## 2020-07-30 RX ADMIN — CARVEDILOL 6.25 MG: 6.25 TABLET, FILM COATED ORAL at 08:25

## 2020-07-30 RX ADMIN — DEXTROAMPHETAMINE SACCHARATE, AMPHETAMINE ASPARTATE, DEXTROAMPHETAMINE SULFATE AND AMPHETAMINE SULFATE 20 MG: 2.5; 2.5; 2.5; 2.5 TABLET ORAL at 08:25

## 2020-07-30 ASSESSMENT — ACTIVITIES OF DAILY LIVING (ADL)
ADLS_ACUITY_SCORE: 11

## 2020-07-30 ASSESSMENT — MIFFLIN-ST. JEOR: SCORE: 1833.35

## 2020-07-30 NOTE — PROCEDURES
[facility], [place]    Procedure: IR Procedure Note    Date/Time: 7/30/2020 11:55 AM  Performed by: Koko Enriquez PA-C  Authorized by: Koko Enriquez PA-C   IR Fellow Physician:  Other(s) attending procedure: Assist: FATEMEH Chávez    UNIVERSAL PROTOCOL   Site Marked: NA  Prior Images Obtained and Reviewed:  Yes  Required items: Required blood products, implants, devices and special equipment available    Patient identity confirmed:  Verbally with patient, arm band, provided demographic data and hospital-assigned identification number  Patient was reevaluated immediately before administering moderate or deep sedation or anesthesia  Confirmation Checklist:  Patient's identity using two indicators, relevant allergies, procedure was appropriate and matched the consent or emergent situation and correct equipment/implants were available  Time out: Immediately prior to the procedure a time out was called    Universal Protocol: the Joint Commission Universal Protocol was followed    Preparation: Patient was prepped and draped in usual sterile fashion    ESBL (mL):  1         ANESTHESIA    Anesthesia: Local infiltration  Local Anesthetic:  Lidocaine 1% without epinephrine  Anesthetic Total (mL):  3      SEDATION    Patient Sedated: No    See dictated procedure note for full details.  Findings: Existing right internal jugular, 5 Fr., single lumen central venous catheter removed intact and without difficulty.      Specimens: other (see comment) (Catheter tip sent for culture.)    Complications: None    Condition: Stable    Plan: Follow up per primary team.    PROCEDURE   Patient Tolerance:  Patient tolerated the procedure well with no immediate complications    Length of time physician/provider present for 1:1 monitoring during sedation: 0

## 2020-07-30 NOTE — PROGRESS NOTES
Nutrition Services Brief Progress Note - please see note from 7/29 for full assessment    Diet: NPO  Labs: reviewed    Notified that patient's central line was removed and now needs PPN    Interventions  1. Discussed with PharmD recommendations from previous RD note for PPN - Clinimix 5/4.25% @ 1200 ml daily ( @ 50 ml/hr) with 100 ml daily 20% lipids.  Goal clinimix Provides: 60 gm dextrose (204 kcal), 51 gm protein (204 kcal) and 200 kcal from lipids = 608 kcal/day (8 kcal /kg) and 51 gm protein (0.7 gm/kg )   2. Sent change order to reflect change to PPN      Unit RD will continue to monitor per protocol    Radha Moon, MS/RD/LD/CNSC  5A/5B RD Pager: [phone]

## 2020-07-30 NOTE — PROGRESS NOTES
"  Care Coordinator - Discharge Planning    Admission Date/Time:  7/28/2020  Attending MD:  Jael Zimmerman MD     Data  Date of initial CC assessment:  7/29/2020  Chart reviewed, discussed with interdisciplinary team.   Patient was admitted for:   1. Staphylococcus epidermidis bacteremia    2. Bloodstream infection due to Cm catheter, initial encounter    3. On total parenteral nutrition    4. Status post bariatric surgery         Assessment   Full assessment completed in previous note    Coordination of Care and Referrals:  Patient will likely need IV antibiotics at discharge. Referral sent to [facility] (open for chronic TPN) to confirm home infusion coverage for IV antibiotics. Patient will not need teaching as he has done home infusion for quite a while. Patient will need IV access prior to discharge. RNCC will continue to follow for discharge planning.       [facility]  Phone  [phone]  Fax  [phone] Addendum:  Per [facility], patient does NOT have coverage for home IV antibiotics. \"Pt s has a BCBS Medicare replacement plan (which follows Medicare guidelines), unfortunately does not cover IV ABX in the home. (Pt would have coverage for short term TCU or IC). Please provide drug, dose and frequency and we will be happy to provide an estimate of what patient would be anticipated to pay for the therapy.\" Provider and patient updated.    Plan  Anticipated Discharge Date:  TBD  Anticipated Discharge Plan:  Home w/home infusion services.     Miri Cooper, RNCC, BSN    [facility]    Medicine Group  [address] [place] [id number]    [email or web address]    Office: [phone] Pager: [phone]  To contact the weekend RNCC, page [phone].         "

## 2020-07-30 NOTE — PROGRESS NOTES
[facility], [facility] Progress Note - Hospitalist Service, Gold 8       Date of Admission:  7/28/2020  Assessment & Plan    Mr. Acevedo is a 50yo M with PMHx notable for s/p bariatric surgery on chronic TPN, recurrent CLABSI, gastric ulcer, short gut syndrome, here for 1 week of fever, chills, and night sweats, concerning for central line infection and bacteremia.      Changes Today:  - Blood cultures 7/26 growing MRSE from Cm, all remaining blood cultures remain NGTD  - Cm port removed by IR today 7/30, port tip sent for culture  - RD transitioned TPN to PPN while no central access  - Plan for 48 hour line holiday, though given weekend anticipate central line replacement Monday 7/3  - Will plan to complete 7 days IV Vanc since first negative culture (7/28-7/3), may be able to complete all abx while in hospital     # MRSE Bacteremia  # Hx recurrent polymicrobial bacteremia  - Blood cultures 7/26 growing MRSE from Cm, 7/28 cultures NGTD  - TTE without vegetations on 7/29   - Repeat cultures if T >100.4 or if more recent blood cultures become positive   - Cm port removed by IR today 7/30, port tip sent for culture  - Plan for 48 hour line holiday, though given weekend anticipate central line replacement Monday 7/3  - Will plan to complete 7 days IV Vanc since first negative culture (7/28-7/3), may be able to complete all abx while in hospital     # Hx Celestino-en-Y gastric bypass, esophagojejunostomy c/b short gut syndrome and chronic malnutrition  # Chronic Abdominal Pain:  Patient on TPN, typically through Cm. RD consulted, TPN orders initiated. Will need a line holiday after Cm removed. Per patient has limited access, will likely need to discuss with IR what kind of access to replace after holiday complete  - RD consulted, appreciate recs, RD transitioned TPN to PPN while no central access on 7/30  - PTA GI cocktail TID prn ordered  - PPI daily  -  Continue PTA Carafate  - Continue PTA bowel regimen   - Increase prn Oxycodone to TID (home dose is BID) given acute pain while hospitalized, will discharge on PTA home opioid regimen  - Continue PTA Fentanyl patch     Chronic medical problems  #COPD  -Continue home albuterol PRN     #ADHD  -Continue home adderall 20mg Daily     #GERD  -Continue home pantoprazole 40mg daily     #Cardiomyopathy with reduced EF  -Coreg 6.25mg BID       Diet: NPO for Medical/Clinical Reasons Except for: Meds, Ice Chips  parenteral nutrition - ADULT compounded formula CYCLE    DVT Prophylaxis: Low Risk/Ambulatory with no VTE prophylaxis indicated  Sanchez Catheter: not present  Code Status: Full Code           Disposition Plan   Expected discharge: 2 - 3 days, recommended to prior living arrangement once antibiotic plan established and SIRS/Sepsis treated.  Entered: Jael Zimmerman MD 07/30/2020, 8:14 AM       The patient's care was discussed with the Bedside Nurse and Patient.    Jael Zimmerman MD  Hospitalist Service, [address], [place]  Pager: [phone]  Please see sticky note for cross cover information  ______________________________________________________________________    Interval History   No acute events overnight. Fevers, chills, night sweats resolved. Still feels fatigued, and not motivated to be active, but pain and overall discomfort and feeling of infection improved. No chest pain, SOB, abdominal pain beyond chronic pain, or any other concerns today.     Data reviewed today: I reviewed all medications, new labs and imaging results over the last 24 hours.    Physical Exam   Vital Signs: Temp: 97.4  F (36.3  C) Temp src: Oral BP: 133/86 Pulse: 71   Resp: 16 SpO2: 97 % O2 Device: None (Room air)    Weight: 210 lbs 0 oz  GEN: pleasant, no acute distress, sitting up in bed  HEENT: no icterus, MMM  CV: RRR, normal s1,s2, no murmurs/rubs no heave. JVP not visible, azevedo removed  CHEST:  clear to ausculation bilaterally, no rales or wheezing  ABD:  normal active bowel sounds, tTP epigastric region with voluntary guarding  EXTR: DP 2+ bilaterally . No clubbing, cyanosis or edema.   NEURO: alert oriented, speech fluent/appropriate, motor grossly nonfocal    Data

## 2020-07-30 NOTE — PLAN OF CARE
Neuro: A&Ox4; WDL  GI: States abdominal discomfort; intermittent nausea at start of shift with 300mL bile emesis; PRN zofran given x1 and PRN GI cocktail given x1 with some relief; NPO except meds and ice chips  : WDL; voids spontaneously  Resp: Denies SOB  Mobility: SBA in room  Cardiac: WDL  Skin: L abdomen kassandra tube with independent venting per pt.  R PIV infusing vanco at 167mL/hr. AM labs state 9.7 vanco level. CVC double lumen RIJ to be removed today at 0930  Lines/Drains:  Pain: states abdominal pain PRNs and PRN oxy given x1 with heating pad given; some relief noted  Behavior: calm; cooperative  VS: VSS    Plan of Care: Continue to manage GI symptoms and pain.  Continue cares and assessments per provider instruction.  Procedure for RIJ removal this AM.  Monitor for changes.

## 2020-07-30 NOTE — PLAN OF CARE
Time: 4898-2848     Reason for Admission: Bacteremia    Activity: Indpendent    Neuro: A&O x4. Pleasant and calm.     GI/: Voiding spontaneously without difficulty.     Diet: NPO except meds and ice chips.     Incisions/Drains: Pt has kassandra tube on L abdomen. Pt venting independently.     IV Access: R PIV saline locked. R internal jugular intact.     Labs: B. COVID negative    Vitals: VSS on RA    Pain: Pt reporting some abdominal pain.     New changes this shift: Orders in place for TPN to start at . MD paged about internal jugular being infected and running TPN through that line. MD gave orders to hold TPN this evening.     Plan: Pt to have R internal jugular removed tomorrow AM. Continue with plan of care.

## 2020-07-30 NOTE — PROGRESS NOTES
[facility] ID Service: Follow Up Note      Patient:  Parker Acevedo   Date of birth 1970, Medical record number [id number]  Date of Visit:  07/30/2020  Date of Admission: 7/28/2020         Assessment and Recommendations:   ID Problem List:  1. DONALDO AGUDELO  2. Celestino-en-Y gastric bypass, esophagojejunostomy c/b short gut syndrome and chronic malnutrition on TPN  3. History of recurrent polymicrobial bacteremia (sp within the last year: Corynebacterium, Fingoldia magna, Granulicatella adiacens, Staph epi, Strep salivarius) with several line replacements in the past. Hx of candidemia (1/2019).    Recommendations:  1. Continue IV Vancomycin, plan for 7 days of therapy after Cm is removed  2. Would remove Cm  - 48 hour line holiday  3. Repeat BCx if patient spikes fever >100.4F or has additional positive blood cultures  4. ID will sign off at this time, please don't hesitate to contact [facility] ID team should further questions arise     Discussion:  Parker Acevedo is a 49 year old male with history of Celestino-en-Y gastric bypass esophagojejunostomy c/b short gut syndrome and chronic malnutrition on TPN, with recurrent polymicrobial bacteremia and several central line replacements in the past, one episode of candidemia (1/2019) who is admitted after 2/2 blood cultures drawn from Cm on 7/26/20 grew Staph epi. Fevers to 101.3F at home occurring during or shortly after TPN infusion, associated with arthralgias, sweats, nausea, and fatigue. He notes mild erythema at Cm site and twinges of pain. Biopatch dressing is missing from line site, therefore there is concern about ongoing contamination and line removal is recommended. Would also compete a 48 hour line holiday prior to replacement.       Don't hesitate to call with questions. ID will sign off at this time.    Attestation:  I have reviewed today's vital signs, medications, labs and imaging.  Elaine Roberts PA-C, Pager #  [phone]            Interval History:       Feeling well this morning, no changes overnight. Is a bit more fatigued than usual but attributes this to not having had TPN for several days and not eating much. Afebrile, no rigors, nausea, headache, dyspnea.         Review of Systems:   Full 9 pt ROS obtained, pertinent positives and negatives as above.          Current Antimicrobials   Current:  - Vancomycin    Prior:           Physical Exam:   Ranges for vital signs:  Temp:  [97.2  F (36.2  C)-98.1  F (36.7  C)] 97.4  F (36.3  C)  Pulse:  [71-75] 71  Resp:  [16-18] 16  BP: (112-133)/(71-86) 133/86  SpO2:  [96 %-97 %] 97 %    Intake/Output Summary (Last 24 hours) at 7/30/2020 1116  Last data filed at 7/30/2020 0412  Gross per 24 hour   Intake 43 ml   Output 550 ml   Net -507 ml     Exam:  GENERAL:  Awake, alert, oriented x4. Ambulating in room on arrival.  LUNGS:  Clear to auscultation bilateral without wheeze or rales.  CARDIOVASCULAR:  Regular rate and rhythm with no murmurs, gallops or rubs.  ABDOMEN:  Normal bowel sounds, soft, nontender, nondistended. +G-tube   SKIN:  R chest Azevedo line in place (did not remove dressing today, plan for removal of line).  NEUROLOGIC:  Grossly nonfocal. Active x4 extremities         Laboratory Data:   Reviewed.  Pertinent for:    Culture data:  7/29/20 Blood culture red port: NGTD  7/29/20 Blood culture, right hand: NGTD  7/28/20 Blood culture purple port: NGTD  7/28/20 Blood culture red port: NGTD  7/26/20 Blood culture, azevedo: Staph epi x2 strains, positive for mecA gene  7/26/20 Blood culture, azevedo: Staph epi x2 strains, positive for mecA gene    Inflammatory Markers    Recent Labs   Lab Test 07/28/20  1607 05/05/20  1218 04/28/20  1245 11/02/19  1853  05/14/19  1145  01/23/18  0845 01/20/18  1030   SED 10  --   --   --   --  13  --  10 11   CRP <2.9 <2.9 <2.9 <2.9   < >  --    < > <2.9 5.4    < > = values in this interval not displayed.       Hematology Studies    Recent  Labs   Lab Test 07/28/20  1607 07/14/20  1120 07/02/20  1200 06/16/20  1410   WBC 6.8 5.9 9.1 6.5   HGB 12.9* 11.0* 12.3* 10.6*   MCV 92 93 92 95    190 186 127*     Recent Labs   Lab Test 07/28/20  1607 07/14/20  1120 07/02/20  1200 06/16/20  1410  03/10/20  1245  11/02/19  1853   ANEU 4.0 3.0 6.1 3.7   < > 3.1   < > 6.2   AEOS 0.1  --   --  0.2  --  0.2  --  0.1    < > = values in this interval not displayed.       Metabolic Studies     Recent Labs   Lab Test 07/30/20  0430 07/28/20  1607 07/14/20  1120 07/02/20  1200    141 142 142   POTASSIUM 3.5 3.6 3.8 3.8   CHLORIDE 109 111* 108 110*   CO2 27 25 31 27   BUN 11 10 19 15   CR 0.69 0.68 0.82 0.66   GFRESTIMATED >90 >90 >90 >90       Hepatic Studies    Recent Labs   Lab Test 07/30/20  0430 07/28/20  1607 07/14/20  1120   BILITOTAL 0.5 0.4 0.3   ALKPHOS 68 69 73   ALBUMIN 3.2* 3.3* 3.3*   AST 13 17 21   ALT 24 26 30            Imaging:   CXR (7/28/20)  FINDINGS: Heart size appears normal. A new right IJ central venous  catheter is present with tip in the SVC. No pneumothorax. No areas of  consolidation. Bony structures appear grossly intact

## 2020-07-30 NOTE — PLAN OF CARE
Vital signs stable on RA. Up self in room and halls. PRN oxycodone and zofran given for abdominal pain and nausea. Central line removed in IR and tip sent to lab for culture. Pt venting with kassandra. NPO. Nicotine and fentanyl patch in place. Pt hoping for discharge tomorrow. Will continue to monitor.

## 2020-07-30 NOTE — PROGRESS NOTES
Patient Name: Parker Acevedo  Medical Record Number: [id number]  Today's Date: 7/30/2020    Procedure: Tunneled  Line removal.   Proceduralist: BRITTANY Enriquez.    Procedure Start: 1140    Procedure end: 1150  Sedation medications administered: none     Report given to: lelo RN  : n/a    Other Notes: Pt arrived to IR room 7 from . Consent reviewed. Pt denies any questions or concerns regarding procedure. Pt positioned supine and monitored per protocol. Tip sent to lab. Pt tolerated procedure without any noted complications. Pt transferred back to .    Mendy Sutton, RN

## 2020-07-30 NOTE — PHARMACY-VANCOMYCIN DOSING SERVICE
Pharmacy Vancomycin Note  Date of Service 2020  Patient's  1970   49 year old, male, ABW 95.3 kg    Indication: Bacteremia - line-associated MRSE, currently attempting to salvage the line  Goal Trough Level: 15-20 mg/L  Day of Therapy: 3  Current Vancomycin regimen:  1750 mg IV q12h (36.8 mg/kg/day)    Current estimated CrCl = Estimated Creatinine Clearance: 155.9 mL/min (based on SCr of 0.69 mg/dL).    Creatinine for last 3 days  2020:  4:07 PM Creatinine 0.68 mg/dL  2020:  4:30 AM Creatinine 0.69 mg/dL    Recent Vancomycin Levels (past 3 days)  2020:  4:30 AM Vancomycin Level 9.7 mg/L - 11.5 hr trough    Vancomycin IV Administrations (past 72 hours)                   vancomycin (VANCOCIN) 1,750 mg in sodium chloride 0.9 % 500 mL intermittent infusion (mg) 1,750 mg New Bag 20 0516     1,750 mg New Bag 20 1707     1,750 mg New Bag  0434     1,750 mg New Bag 20 1724                Nephrotoxins and other renal medications (From now, onward)    Start     Dose/Rate Route Frequency Ordered Stop    20 1640  vancomycin (VANCOCIN) 1,750 mg in sodium chloride 0.9 % 500 mL intermittent infusion      1,750 mg  over 3 Hours Intravenous EVERY 12 HOURS 20 1640               Contrast Orders - past 72 hours (72h ago, onward)    None          Interpretation of levels and current regimen:  Trough level is  Subtherapeutic. Pt has previously had therapeutic levels on regimen of 1250 mg IV q8h. SCr at that time was very similar to current renal function.    Has serum creatinine changed > 50% in last 72 hours: No    Urine output:  good urine output, voiding spontaneously per RN notes, incomplete UOP documentation    Renal Function: Stable    Plan:  1.  Increase regimen to vancomycin 1250 mg IV q8h (39.5 mg/kg/day) given pt has history of therapeutic levels on this regimen.  2.  Pharmacy will check trough levels as appropriate in 1-3 Days.    3.  Serum creatinine levels will be  ordered daily for the first week of therapy and at least twice weekly for subsequent weeks.    4.  Pt requires pre-medication of vancomycin doses for history of rash in 2016, but tolerates slower infusion. Will adjust PO diphenhydramine to match above vancomycin regimen.    Wilberto Farah, PharmD, BCPS

## 2020-07-31 LAB
ALBUMIN SERPL-MCNC: 3.5 G/DL (ref 3.4–5)
ALP SERPL-CCNC: 77 U/L (ref 40–150)
ALT SERPL W P-5'-P-CCNC: 24 U/L (ref 0–70)
ANION GAP SERPL CALCULATED.3IONS-SCNC: 6 MMOL/L (ref 3–14)
AST SERPL W P-5'-P-CCNC: 11 U/L (ref 0–45)
BILIRUB DIRECT SERPL-MCNC: 0.1 MG/DL (ref 0–0.2)
BILIRUB SERPL-MCNC: 0.6 MG/DL (ref 0.2–1.3)
BUN SERPL-MCNC: 12 MG/DL (ref 7–30)
CALCIUM SERPL-MCNC: 8.5 MG/DL (ref 8.5–10.1)
CHLORIDE SERPL-SCNC: 107 MMOL/L (ref 94–109)
CO2 SERPL-SCNC: 26 MMOL/L (ref 20–32)
CREAT SERPL-MCNC: 0.82 MG/DL (ref 0.66–1.25)
GFR SERPL CREATININE-BSD FRML MDRD: >90 ML/MIN/{1.73_M2}
GLUCOSE BLDC GLUCOMTR-MCNC: 109 MG/DL (ref 70–99)
GLUCOSE BLDC GLUCOMTR-MCNC: 110 MG/DL (ref 70–99)
GLUCOSE BLDC GLUCOMTR-MCNC: 87 MG/DL (ref 70–99)
GLUCOSE BLDC GLUCOMTR-MCNC: 92 MG/DL (ref 70–99)
GLUCOSE BLDC GLUCOMTR-MCNC: 95 MG/DL (ref 70–99)
GLUCOSE SERPL-MCNC: 86 MG/DL (ref 70–99)
INR PPP: 1.07 (ref 0.86–1.14)
MAGNESIUM SERPL-MCNC: 2.3 MG/DL (ref 1.6–2.3)
PHOSPHATE SERPL-MCNC: 4.1 MG/DL (ref 2.5–4.5)
POTASSIUM SERPL-SCNC: 3.6 MMOL/L (ref 3.4–5.3)
PROT SERPL-MCNC: 7.2 G/DL (ref 6.8–8.8)
SODIUM SERPL-SCNC: 138 MMOL/L (ref 133–144)

## 2020-07-31 PROCEDURE — 80053 COMPREHEN METABOLIC PANEL: CPT | Performed by: INTERNAL MEDICINE

## 2020-07-31 PROCEDURE — 00000146 ZZHCL STATISTIC GLUCOSE BY METER IP

## 2020-07-31 PROCEDURE — 85610 PROTHROMBIN TIME: CPT | Performed by: INTERNAL MEDICINE

## 2020-07-31 PROCEDURE — 40000141 ZZH STATISTIC PERIPHERAL IV START W/O US GUIDANCE

## 2020-07-31 PROCEDURE — 25000125 ZZHC RX 250: Performed by: INTERNAL MEDICINE

## 2020-07-31 PROCEDURE — 83735 ASSAY OF MAGNESIUM: CPT | Performed by: INTERNAL MEDICINE

## 2020-07-31 PROCEDURE — 25000132 ZZH RX MED GY IP 250 OP 250 PS 637: Performed by: INTERNAL MEDICINE

## 2020-07-31 PROCEDURE — 99233 SBSQ HOSP IP/OBS HIGH 50: CPT | Performed by: INTERNAL MEDICINE

## 2020-07-31 PROCEDURE — 25000132 ZZH RX MED GY IP 250 OP 250 PS 637: Performed by: STUDENT IN AN ORGANIZED HEALTH CARE EDUCATION/TRAINING PROGRAM

## 2020-07-31 PROCEDURE — 36415 COLL VENOUS BLD VENIPUNCTURE: CPT | Performed by: INTERNAL MEDICINE

## 2020-07-31 PROCEDURE — 82248 BILIRUBIN DIRECT: CPT | Performed by: INTERNAL MEDICINE

## 2020-07-31 PROCEDURE — 84100 ASSAY OF PHOSPHORUS: CPT | Performed by: INTERNAL MEDICINE

## 2020-07-31 PROCEDURE — 25000128 H RX IP 250 OP 636: Performed by: STUDENT IN AN ORGANIZED HEALTH CARE EDUCATION/TRAINING PROGRAM

## 2020-07-31 PROCEDURE — 40000556 ZZH STATISTIC PERIPHERAL IV START W US GUIDANCE

## 2020-07-31 PROCEDURE — 25000128 H RX IP 250 OP 636: Performed by: INTERNAL MEDICINE

## 2020-07-31 PROCEDURE — 25000132 ZZH RX MED GY IP 250 OP 250 PS 637: Performed by: HOSPITALIST

## 2020-07-31 PROCEDURE — 25800030 ZZH RX IP 258 OP 636: Performed by: INTERNAL MEDICINE

## 2020-07-31 PROCEDURE — 12000001 ZZH R&B MED SURG/OB UMMC

## 2020-07-31 RX ORDER — OXYCODONE HCL 5 MG/5 ML
5-10 SOLUTION, ORAL ORAL 4 TIMES DAILY PRN
Status: DISCONTINUED | OUTPATIENT
Start: 2020-07-31 | End: 2020-08-01

## 2020-07-31 RX ADMIN — CARVEDILOL 6.25 MG: 6.25 TABLET, FILM COATED ORAL at 07:45

## 2020-07-31 RX ADMIN — LORAZEPAM 1 MG: 1 TABLET ORAL at 21:25

## 2020-07-31 RX ADMIN — OXYCODONE HYDROCHLORIDE 10 MG: 5 SOLUTION ORAL at 11:31

## 2020-07-31 RX ADMIN — VANCOMYCIN HYDROCHLORIDE 1250 MG: 10 INJECTION, POWDER, LYOPHILIZED, FOR SOLUTION INTRAVENOUS at 08:25

## 2020-07-31 RX ADMIN — NICOTINE 1 PATCH: 21 PATCH, EXTENDED RELEASE TRANSDERMAL at 07:50

## 2020-07-31 RX ADMIN — ONDANSETRON 8 MG: 4 TABLET, ORALLY DISINTEGRATING ORAL at 01:40

## 2020-07-31 RX ADMIN — PANTOPRAZOLE SODIUM 40 MG: 40 TABLET, DELAYED RELEASE ORAL at 07:45

## 2020-07-31 RX ADMIN — LORAZEPAM 1 MG: 1 TABLET ORAL at 02:05

## 2020-07-31 RX ADMIN — LIDOCAINE HYDROCHLORIDE 30 ML: 20 SOLUTION ORAL; TOPICAL at 21:28

## 2020-07-31 RX ADMIN — SUCRALFATE 1 G: 1 SUSPENSION ORAL at 21:25

## 2020-07-31 RX ADMIN — SUCRALFATE 1 G: 1 SUSPENSION ORAL at 16:24

## 2020-07-31 RX ADMIN — DEXTROAMPHETAMINE SACCHARATE, AMPHETAMINE ASPARTATE, DEXTROAMPHETAMINE SULFATE AND AMPHETAMINE SULFATE 20 MG: 2.5; 2.5; 2.5; 2.5 TABLET ORAL at 07:45

## 2020-07-31 RX ADMIN — SUCRALFATE 1 G: 1 SUSPENSION ORAL at 12:22

## 2020-07-31 RX ADMIN — OXYCODONE HYDROCHLORIDE 10 MG: 5 SOLUTION ORAL at 15:37

## 2020-07-31 RX ADMIN — OXYCODONE HYDROCHLORIDE 10 MG: 5 SOLUTION ORAL at 21:27

## 2020-07-31 RX ADMIN — DIPHENHYDRAMINE HYDROCHLORIDE 25 MG: 25 CAPSULE ORAL at 07:45

## 2020-07-31 RX ADMIN — DIPHENHYDRAMINE HYDROCHLORIDE 25 MG: 25 CAPSULE ORAL at 15:37

## 2020-07-31 RX ADMIN — LIDOCAINE HYDROCHLORIDE 30 ML: 20 SOLUTION ORAL; TOPICAL at 11:31

## 2020-07-31 RX ADMIN — FENTANYL 1 PATCH: 25 PATCH, EXTENDED RELEASE TRANSDERMAL at 01:31

## 2020-07-31 RX ADMIN — CARVEDILOL 6.25 MG: 6.25 TABLET, FILM COATED ORAL at 17:37

## 2020-07-31 RX ADMIN — SUCRALFATE 1 G: 1 SUSPENSION ORAL at 07:45

## 2020-07-31 RX ADMIN — OXYCODONE HYDROCHLORIDE 10 MG: 5 SOLUTION ORAL at 00:24

## 2020-07-31 RX ADMIN — VANCOMYCIN HYDROCHLORIDE 1250 MG: 10 INJECTION, POWDER, LYOPHILIZED, FOR SOLUTION INTRAVENOUS at 16:24

## 2020-07-31 RX ADMIN — LIDOCAINE HYDROCHLORIDE 30 ML: 20 SOLUTION ORAL; TOPICAL at 00:41

## 2020-07-31 ASSESSMENT — ACTIVITIES OF DAILY LIVING (ADL)
ADLS_ACUITY_SCORE: 11

## 2020-07-31 ASSESSMENT — MIFFLIN-ST. JEOR: SCORE: 1830.18

## 2020-07-31 NOTE — PLAN OF CARE
Vital signs stable on Ra. Up self in room and halls. PRN oxycodone and GI cocktail given for abdominal pain. New PIV placed (2nd today). Pt holding off on PPN currently, MD's aware. Blood sugars stable. Started on clear liquid diet. Will continue to monitor.

## 2020-07-31 NOTE — PROVIDER NOTIFICATION
Microbiology lab notified of critical lab via R internal jugular CVC port culuture from 7/28. No listeria from R IJ port site.  However, was positive for cocci clusters and bacilli resembling dipthroid.    Cross cover notified

## 2020-07-31 NOTE — PLAN OF CARE
Assumed care 1500 to 2330. Pt alert and oriented.Vital signs stable. Pt has a drain on left upper abdomen for drainage. Pt has Pain in abdomen and arms from old Ivs PRN oxycodone given twice. Pt denies nausea and SOB. Lung sounds clear. Cardiac WDL. Bowel Sounds present. Pt had GI cocktail Pt voiding adequately, clear yellow urine. Pt up independent with transfers. Skin clean, dry, and intact. PIV infusing vancomycin over 3 hours. Pt did not want to be hooked back up to [facility] due to it causing 3 Ivs to extravasated.  and 95. Pt rounded on hourly. Will continue to monitor.

## 2020-07-31 NOTE — CONSULTS
INTERVENTIONAL RADIOLOGY CONSULT NOTE    Parker Acevedo is a 49 year old male well known to IR for vascular access devices for TPN use now. Line was recently removed on 7/29 for staph epi bacteremia. Request for replacement of TCVC dual lumen azevedo after line holiday and negative blood cultures.     Patient is on IR schedule Monday 8/3/20 for a dual lumen Azevedo line placement.   Labs WNL for procedure.    Orders for NPO, scrubs and antibiotics have been entered.   Medications to be held include: None  Consent will be done prior to procedure.    Platelet Count   Date Value Ref Range Status   07/28/2020 189 150 - 450 10e9/L Final     INR   Date Value Ref Range Status   07/31/2020 1.07 0.86 - 1.14 Final        Please contact the IR charge RN at [phone] for estimated time of procedure.     Case discussed with Jael Zimmerman [phone]    Linda Queen PA-C  Interventional Radiology

## 2020-07-31 NOTE — PROVIDER NOTIFICATION
Patient c/o pain at IV site with Vanco infusing around 0030. Flushed with NS to check patency and patient able to tolerate flush. No redness or swelling at IV insertion site.  Requesting staff to get midline placed due to poor veins. Infusion slowed down and Dr. Martinez on gold cross-cover ([id number]) paged. Dr. Martinez will pass on to day team. Will continue to monitor site and follow plan of care.

## 2020-07-31 NOTE — PLAN OF CARE
"Assumed Care: 1500 - 2330  Admission/Status: Bacteremia secondary to central line    VS: /80 (BP Location: Right arm)   Pulse 75   Temp 98.7  F (37.1  C) (Oral)   Resp 18   Ht 1.803 m (5' 11\")   Wt 93.6 kg (206 lb 6.4 oz)   SpO2 96%   BMI 28.79 kg/m   - VSS on RA.  Neuro: Aox4. PERRLA, EOMI. Moves all extremities, denies numbness/tingling.   Cardiac: WNL.  Respiratory: Clear and equal bilaterally.  GI/: Abdomen soft, tender with discomfort not relieved by typical draining. Sounds audible and normoactive.  -BM this shift. Voiding spontaneously.  Nutrition: NPO ex; meds.  IV/Drains: PIVs replaced twice for phlebitis and pain.  Patient refusing additional PIV at this time, therefore PPN was paused for vancomycin infusion.  \"Micce\" WDL.  Activity: Up ad vitor in room and ambulating in hallway.  Pain: Abdominal pain and distension worsened by parenteral nutrition.  Relief by medications (see MAR).  Skin: Warm, dry and elastic.  Labs: Positive cultures from \"Red port\" of central line.  Cultures resembled gram positive cocci, and gram positive bacilli.    New this shift: Positive blood cultures returned.  Pain relieved by current PRNs.  Continue POC.    "

## 2020-07-31 NOTE — PROGRESS NOTES
Care Coordinator - Discharge Planning    Admission Date/Time:  7/28/2020  Attending MD:  Jael Zimmerman MD     Data  Date of initial CC assessment:  7/29/2020  Chart reviewed, discussed with interdisciplinary team.   Patient was admitted for:   1. Staphylococcus epidermidis bacteremia    2. Bloodstream infection due to Cm catheter, initial encounter    3. On total parenteral nutrition    4. Status post bariatric surgery         Assessment   Full assessment completed in previous note    Coordination of Care and Referrals:   Writer met with patient and spoke with his wife via phone regarding discharge planning. Patient is adamant that he has home infusion coverage for IV antibiotics. Patient and spouse confirmed he has a BCBS Medicare Advantage plan. Writer has reached back out to [facility] to run benefit check an additional time, as patient states he has done home IV antibiotics in the past with his current insurance. Patient is on the IR schedule for Monday, 8/3 for port placement. Per ID, will need IV antibiotics through 7/6. RNCC will continue to follow for discharge planning.     [facility] (Chronic TPN and IV antibiotics)  Phone  [phone]  Fax  [phone] Addendum:  Per [facility], they were able to secure authorization for coverage for home IV antibiotics and has met his OOP and will be covered at 100%. [facility] has updated patient's spouse. Plan for port placement on Monday and likely discharge after w/home infusion services, home infusion order placed. RNCC will continue to follow for discharge planning.       Plan  Anticipated Discharge Date:  TBD  Anticipated Discharge Plan:  TBD    GERRI De Jesus, BSN    [facility]    Medicine Group  [address] [place] [id number]    [email or web address]    Office: [phone] Pager: [phone]  To contact the weekend RNCC, page  [phone].

## 2020-07-31 NOTE — PROVIDER NOTIFICATION
Dr Zimmerman paged about concerns with IV access. Currently pt has 1 PIV. This is the 3rd IV within less than 24 hrs. Pt states if this IV goes bad he will not allow another. Vancomycin is run over 3hrs. Pt also has PPN continues orders. Dr. Zimmerman notified that PPN has not been running since yesterday evening. Will continue to monitor.

## 2020-07-31 NOTE — PROVIDER NOTIFICATION
"Microbiology lab notified the writer of a critical lab via phone.    Positive cultures from \"Red port\" of central line.  Cultures resembled gram positive cocci, and gram positive bacilli.    This information was shared with the gold cross cover provider, Abdiel Lucas.    No new orders were established at this time.  "

## 2020-07-31 NOTE — PLAN OF CARE
Neuro: A&Ox4; WDL  GI: 375mL output green bile from kassandra. Bowel sounds present x4  : Voids spontaneously WDL  Resp: Denies SOB; stable on RA  Mobility: Ind in room  Cardiac: WDL  Skin: warm, dry, intact  Lines/Drains: L PIV painful with vanco infusion.  Rate slowed and provider notified (see Diane RN note).  Heat applied with minimal relief.  Pt removed L PIV.  Ordered replacement as to give TPN.  Pain: Stated pain in L PIV site and previous R PIV site. Both sites elevated with heat and cold applied with minor relief. PRN oxy given x1.    Behavior: calm; cooperative  VS: VSS    Plan of Care: Continue cares and assessments per provider instruction; monitor for changes.  Monitor vascular access sites and continue IV antibiotics and PPN.

## 2020-08-01 LAB
ANION GAP SERPL CALCULATED.3IONS-SCNC: 4 MMOL/L (ref 3–14)
BACTERIA SPEC CULT: ABNORMAL
BUN SERPL-MCNC: 16 MG/DL (ref 7–30)
CALCIUM SERPL-MCNC: 8.8 MG/DL (ref 8.5–10.1)
CHLORIDE SERPL-SCNC: 106 MMOL/L (ref 94–109)
CO2 SERPL-SCNC: 30 MMOL/L (ref 20–32)
CREAT SERPL-MCNC: 0.86 MG/DL (ref 0.66–1.25)
GFR SERPL CREATININE-BSD FRML MDRD: >90 ML/MIN/{1.73_M2}
GLUCOSE BLDC GLUCOMTR-MCNC: 100 MG/DL (ref 70–99)
GLUCOSE BLDC GLUCOMTR-MCNC: 137 MG/DL (ref 70–99)
GLUCOSE BLDC GLUCOMTR-MCNC: 93 MG/DL (ref 70–99)
GLUCOSE SERPL-MCNC: 89 MG/DL (ref 70–99)
MAGNESIUM SERPL-MCNC: 2.2 MG/DL (ref 1.6–2.3)
PHOSPHATE SERPL-MCNC: 4.5 MG/DL (ref 2.5–4.5)
POTASSIUM SERPL-SCNC: 3.7 MMOL/L (ref 3.4–5.3)
SODIUM SERPL-SCNC: 140 MMOL/L (ref 133–144)
SPECIMEN SOURCE: ABNORMAL

## 2020-08-01 PROCEDURE — 25000132 ZZH RX MED GY IP 250 OP 250 PS 637: Performed by: INTERNAL MEDICINE

## 2020-08-01 PROCEDURE — 36415 COLL VENOUS BLD VENIPUNCTURE: CPT | Performed by: INTERNAL MEDICINE

## 2020-08-01 PROCEDURE — 25000128 H RX IP 250 OP 636: Performed by: STUDENT IN AN ORGANIZED HEALTH CARE EDUCATION/TRAINING PROGRAM

## 2020-08-01 PROCEDURE — 25000132 ZZH RX MED GY IP 250 OP 250 PS 637: Performed by: HOSPITALIST

## 2020-08-01 PROCEDURE — 40000556 ZZH STATISTIC PERIPHERAL IV START W US GUIDANCE

## 2020-08-01 PROCEDURE — 25000125 ZZHC RX 250: Performed by: INTERNAL MEDICINE

## 2020-08-01 PROCEDURE — 25800030 ZZH RX IP 258 OP 636: Performed by: INTERNAL MEDICINE

## 2020-08-01 PROCEDURE — 25000132 ZZH RX MED GY IP 250 OP 250 PS 637: Performed by: NURSE PRACTITIONER

## 2020-08-01 PROCEDURE — 83735 ASSAY OF MAGNESIUM: CPT | Performed by: INTERNAL MEDICINE

## 2020-08-01 PROCEDURE — 00000146 ZZHCL STATISTIC GLUCOSE BY METER IP

## 2020-08-01 PROCEDURE — 99232 SBSQ HOSP IP/OBS MODERATE 35: CPT | Performed by: INTERNAL MEDICINE

## 2020-08-01 PROCEDURE — 40000141 ZZH STATISTIC PERIPHERAL IV START W/O US GUIDANCE

## 2020-08-01 PROCEDURE — 25000132 ZZH RX MED GY IP 250 OP 250 PS 637: Performed by: STUDENT IN AN ORGANIZED HEALTH CARE EDUCATION/TRAINING PROGRAM

## 2020-08-01 PROCEDURE — 25000128 H RX IP 250 OP 636: Performed by: INTERNAL MEDICINE

## 2020-08-01 PROCEDURE — 12000001 ZZH R&B MED SURG/OB UMMC

## 2020-08-01 PROCEDURE — 84100 ASSAY OF PHOSPHORUS: CPT | Performed by: INTERNAL MEDICINE

## 2020-08-01 PROCEDURE — 80048 BASIC METABOLIC PNL TOTAL CA: CPT | Performed by: INTERNAL MEDICINE

## 2020-08-01 RX ORDER — OXYCODONE HCL 5 MG/5 ML
5-10 SOLUTION, ORAL ORAL EVERY 4 HOURS PRN
Status: DISCONTINUED | OUTPATIENT
Start: 2020-08-01 | End: 2020-08-03 | Stop reason: HOSPADM

## 2020-08-01 RX ADMIN — VANCOMYCIN HYDROCHLORIDE 1250 MG: 10 INJECTION, POWDER, LYOPHILIZED, FOR SOLUTION INTRAVENOUS at 21:10

## 2020-08-01 RX ADMIN — OXYCODONE HYDROCHLORIDE 10 MG: 5 SOLUTION ORAL at 05:21

## 2020-08-01 RX ADMIN — OXYCODONE HYDROCHLORIDE 10 MG: 5 SOLUTION ORAL at 21:10

## 2020-08-01 RX ADMIN — NICOTINE 1 PATCH: 21 PATCH, EXTENDED RELEASE TRANSDERMAL at 09:15

## 2020-08-01 RX ADMIN — VANCOMYCIN HYDROCHLORIDE 1250 MG: 10 INJECTION, POWDER, LYOPHILIZED, FOR SOLUTION INTRAVENOUS at 01:27

## 2020-08-01 RX ADMIN — SUCRALFATE 1 G: 1 SUSPENSION ORAL at 12:17

## 2020-08-01 RX ADMIN — ONDANSETRON 8 MG: 4 TABLET, ORALLY DISINTEGRATING ORAL at 01:28

## 2020-08-01 RX ADMIN — SUCRALFATE 1 G: 1 SUSPENSION ORAL at 21:10

## 2020-08-01 RX ADMIN — VANCOMYCIN HYDROCHLORIDE 1250 MG: 10 INJECTION, POWDER, LYOPHILIZED, FOR SOLUTION INTRAVENOUS at 12:19

## 2020-08-01 RX ADMIN — FENTANYL 1 PATCH: 25 PATCH, EXTENDED RELEASE TRANSDERMAL at 20:29

## 2020-08-01 RX ADMIN — ONDANSETRON 8 MG: 4 TABLET, ORALLY DISINTEGRATING ORAL at 12:17

## 2020-08-01 RX ADMIN — PANTOPRAZOLE SODIUM 40 MG: 40 TABLET, DELAYED RELEASE ORAL at 09:17

## 2020-08-01 RX ADMIN — LORAZEPAM 1 MG: 1 TABLET ORAL at 21:06

## 2020-08-01 RX ADMIN — DIPHENHYDRAMINE HYDROCHLORIDE 25 MG: 25 CAPSULE ORAL at 20:28

## 2020-08-01 RX ADMIN — SUCRALFATE 1 G: 1 SUSPENSION ORAL at 16:07

## 2020-08-01 RX ADMIN — CARVEDILOL 6.25 MG: 6.25 TABLET, FILM COATED ORAL at 09:17

## 2020-08-01 RX ADMIN — CARVEDILOL 6.25 MG: 6.25 TABLET, FILM COATED ORAL at 21:06

## 2020-08-01 RX ADMIN — OXYCODONE HYDROCHLORIDE 10 MG: 5 SOLUTION ORAL at 12:17

## 2020-08-01 RX ADMIN — DIPHENHYDRAMINE HYDROCHLORIDE 25 MG: 25 CAPSULE ORAL at 00:27

## 2020-08-01 RX ADMIN — LIDOCAINE HYDROCHLORIDE 30 ML: 20 SOLUTION ORAL; TOPICAL at 14:51

## 2020-08-01 RX ADMIN — DEXTROAMPHETAMINE SACCHARATE, AMPHETAMINE ASPARTATE, DEXTROAMPHETAMINE SULFATE AND AMPHETAMINE SULFATE 20 MG: 2.5; 2.5; 2.5; 2.5 TABLET ORAL at 09:16

## 2020-08-01 RX ADMIN — VANCOMYCIN HYDROCHLORIDE 1250 MG: 10 INJECTION, POWDER, LYOPHILIZED, FOR SOLUTION INTRAVENOUS at 05:38

## 2020-08-01 RX ADMIN — OXYCODONE HYDROCHLORIDE 10 MG: 5 SOLUTION ORAL at 01:30

## 2020-08-01 RX ADMIN — DIPHENHYDRAMINE HYDROCHLORIDE 25 MG: 25 CAPSULE ORAL at 11:47

## 2020-08-01 RX ADMIN — OXYCODONE HYDROCHLORIDE 10 MG: 5 SOLUTION ORAL at 16:07

## 2020-08-01 ASSESSMENT — MIFFLIN-ST. JEOR: SCORE: 1828.36

## 2020-08-01 ASSESSMENT — ACTIVITIES OF DAILY LIVING (ADL)
ADLS_ACUITY_SCORE: 9
ADLS_ACUITY_SCORE: 11
ADLS_ACUITY_SCORE: 11
ADLS_ACUITY_SCORE: 9
ADLS_ACUITY_SCORE: 11
ADLS_ACUITY_SCORE: 9

## 2020-08-01 NOTE — PROGRESS NOTES
[facility], [facility] Progress Note - Hospitalist Service, Gold        Date of Admission:  7/28/2020  Assessment & Plan    Mr. Acevedo is a 50yo M with PMHx notable for s/p bariatric surgery on chronic TPN, recurrent CLABSI, gastric ulcer, short gut syndrome, here for 1 week of fever, chills, and night sweats, concerning for central line infection and bacteremia.      Changes Today:  - Blood cultures 7/26 growing MRSE from Cm, repeat blood culture 7/28 on admission with GPCs, speciation pending  - Cm port removed IR today 7/30, port tip with staph epi c/w blood cultures from port   - IR planning for line replacement Monday 8/3, anticipate discharge to home after that with home IV antibiotics through 8/6     # MRSE Bacteremia  # Hx recurrent polymicrobial bacteremia  - Blood cultures 7/26 growing MRSE from Cm, repeat blood culture 7/28 on admission with GPCs, speciation pending  - TTE without vegetations on 7/29   - Cm port removed IR today 7/30, port tip with staph epi c/w blood cultures from port   - Pt having difficulty with PIVs, has required frequent replacement; unfortunately can only ran IV Vanc via central line or PIV, and is on central line holiday, so needs to continue having PIVs replaced for now until central access attained again  - Discussed with IR if they could try to replace port on Saturday afternoon to expedite discharge, but not able to be done until Monday 8/3  - RNCC confirmed patient does have coverage for IV antibiotics; can theoretically discharge following port replacement Monday 8/3 with plan to complete 7 days total IV Vanco from date of port reval (7/30-8/6)     # Hx Celestino-en-Y gastric bypass, esophagojejunostomy c/b short gut syndrome and chronic malnutrition  # Chronic Abdominal Pain:  Patient on TPN, typically through Cm. RD consulted, TPN orders initiated, transitioned to PPN when central access removed.   - VIRI  "consulted, appreciate recs, RD transitioned TPN to PPN while no central access on 7/30, pt intermittently resfusing to have PPN run as he thinks this increases his PIVs failure  - PTA GI cocktail TID prn ordered  - PPI daily  - Continue PTA Carafate  - Continue PTA bowel regimen   - Increase prn Oxycodone to QID (home dose is BID) given acute pain while hospitalized, will discharge on PTA home opioid regimen  - Continue PTA Fentanyl patch   - Offered to advance diet to mirror home regimen, but pt declined stating he is not hungry and does not want diet advanced     Chronic medical problems  #COPD  -Continue home albuterol PRN     #ADHD  -Continue home adderall 20mg Daily     #GERD  -Continue home pantoprazole 40mg daily     #Cardiomyopathy with reduced EF  -Coreg 6.25mg BID       Diet: parenteral nutrition - Clinimix E  Clear Liquid Diet    DVT Prophylaxis: Low Risk/Ambulatory with no VTE prophylaxis indicated  Sanchez Catheter: not present  Code Status: Full Code           Disposition Plan   Expected discharge: 2 - 3 days, recommended to prior living arrangement once antibiotic plan established and SIRS/Sepsis treated.  Entered: Jael Zimmerman MD 08/01/2020, 8:38 AM       The patient's care was discussed with the Bedside Nurse and Patient.    Jael Zimmerman MD  Hospitalist Service, [address], [place]  Pager: [phone]  Please see sticky note for cross cover information  ______________________________________________________________________    Interval History   No acute events overnight. Feeling tired, had an episode where IV Vanc infiltrated last night that was painful, better today. Feels bored waiting for port replaced but otherwise doing well. I asked if he was hungry and wanted his diet advanced to mirror what he does at home in terms of eating, but he declined and states that he would not want to waste food in the hospital as he usually only eats \"a bit or two\" of " things. No other concerns today.     Data reviewed today: I reviewed all medications, new labs and imaging results over the last 24 hours.    Physical Exam   Vital Signs: Temp: 95.9  F (35.5  C) Temp src: Oral BP: 116/81 Pulse: 68   Resp: 18 SpO2: 97 % O2 Device: None (Room air)    Weight: 205 lbs 11.2 oz  GEN: pleasant, no acute distress, sitting up in bed  HEENT: no icterus, MMM  CV: RRR, normal s1,s2, no murmurs/rubs no heave. JVP not visible, azevedo removed  CHEST: clear to ausculation bilaterally, no rales or wheezing  ABD:  normal active bowel sounds, tTP epigastric region with voluntary guarding  EXTR: DP 2+ bilaterally . No clubbing, cyanosis or edema.   NEURO: alert oriented, speech fluent/appropriate, motor grossly nonfocal    Data

## 2020-08-01 NOTE — PLAN OF CARE
"Assumed cares 1900 to 0700    /81 (BP Location: Right arm)   Pulse 68   Temp 95.9  F (35.5  C) (Oral)   Resp 18   Ht 1.803 m (5' 11\")   Wt 93.3 kg (205 lb 11.2 oz)   SpO2 97%   BMI 28.69 kg/m      A&o x4, VSS on RA, c/o abd pain managed w/oxycodone. Followed extravasation protocol d/t IV vancomycin for redness, some swelling, & pain at R IV site when touched. Warm packs applied. Skin still intact. Provider notified. New IV placed. Pt denies further pain at site.   "

## 2020-08-01 NOTE — PLAN OF CARE
Care from: [id number]     Neuro: A&Ox4.   Respiratory: Breathing on room air   Cardiac: Afebrile. BP & HR WNL  GI/: Reporting abdominal discomfort w/ nausea. Pt w/  approx 100 ml of bile output from PEG (vents indep). PRN PO Zofran provided Q6.  Nutrition: Continues on clears. Patient took sips of water throughout day. BG checks 89 & 93  Pain: PRN oxy & GI cocktail given for pain management   Lines: PIV SL  Activity: Up ad vitor  Events this shift: No acute events    Plan: IR planning for line replacement Monday 8/3, anticipate discharge to home after that with home IV antibiotics through 8/6 (per MD note)

## 2020-08-01 NOTE — PROVIDER NOTIFICATION
GOLDIE Wood - STAFF [ Msg Id [id number] ]      5B, rm 31-1, T.S. had extravasation from IV Vancomycin, some redness, swelling, & pain. Warm pack applied, oxycodone given. Skin intact. Has new IV now. Johnna HARRISON [id number]

## 2020-08-02 LAB
CREAT SERPL-MCNC: 0.89 MG/DL (ref 0.66–1.25)
GFR SERPL CREATININE-BSD FRML MDRD: >90 ML/MIN/{1.73_M2}
GLUCOSE BLDC GLUCOMTR-MCNC: 82 MG/DL (ref 70–99)
GLUCOSE BLDC GLUCOMTR-MCNC: 86 MG/DL (ref 70–99)
GLUCOSE BLDC GLUCOMTR-MCNC: 95 MG/DL (ref 70–99)
MAGNESIUM SERPL-MCNC: 2.1 MG/DL (ref 1.6–2.3)
PHOSPHATE SERPL-MCNC: 4.2 MG/DL (ref 2.5–4.5)
VANCOMYCIN SERPL-MCNC: 15.8 MG/L

## 2020-08-02 PROCEDURE — 82565 ASSAY OF CREATININE: CPT | Performed by: INTERNAL MEDICINE

## 2020-08-02 PROCEDURE — 83735 ASSAY OF MAGNESIUM: CPT | Performed by: INTERNAL MEDICINE

## 2020-08-02 PROCEDURE — 25000132 ZZH RX MED GY IP 250 OP 250 PS 637: Performed by: HOSPITALIST

## 2020-08-02 PROCEDURE — 84100 ASSAY OF PHOSPHORUS: CPT | Performed by: INTERNAL MEDICINE

## 2020-08-02 PROCEDURE — 25000125 ZZHC RX 250: Performed by: INTERNAL MEDICINE

## 2020-08-02 PROCEDURE — 25000128 H RX IP 250 OP 636: Performed by: INTERNAL MEDICINE

## 2020-08-02 PROCEDURE — 12000001 ZZH R&B MED SURG/OB UMMC

## 2020-08-02 PROCEDURE — 00000146 ZZHCL STATISTIC GLUCOSE BY METER IP

## 2020-08-02 PROCEDURE — 25000128 H RX IP 250 OP 636: Performed by: STUDENT IN AN ORGANIZED HEALTH CARE EDUCATION/TRAINING PROGRAM

## 2020-08-02 PROCEDURE — 25000132 ZZH RX MED GY IP 250 OP 250 PS 637: Performed by: NURSE PRACTITIONER

## 2020-08-02 PROCEDURE — 25000132 ZZH RX MED GY IP 250 OP 250 PS 637: Performed by: STUDENT IN AN ORGANIZED HEALTH CARE EDUCATION/TRAINING PROGRAM

## 2020-08-02 PROCEDURE — 25800030 ZZH RX IP 258 OP 636: Performed by: INTERNAL MEDICINE

## 2020-08-02 PROCEDURE — 40000556 ZZH STATISTIC PERIPHERAL IV START W US GUIDANCE

## 2020-08-02 PROCEDURE — 25000132 ZZH RX MED GY IP 250 OP 250 PS 637: Performed by: INTERNAL MEDICINE

## 2020-08-02 PROCEDURE — 99232 SBSQ HOSP IP/OBS MODERATE 35: CPT | Performed by: INTERNAL MEDICINE

## 2020-08-02 PROCEDURE — 80202 ASSAY OF VANCOMYCIN: CPT | Performed by: INTERNAL MEDICINE

## 2020-08-02 PROCEDURE — 36415 COLL VENOUS BLD VENIPUNCTURE: CPT | Performed by: INTERNAL MEDICINE

## 2020-08-02 RX ORDER — CLINDAMYCIN PHOSPHATE 900 MG/50ML
900 INJECTION, SOLUTION INTRAVENOUS
Status: COMPLETED | OUTPATIENT
Start: 2020-08-02 | End: 2020-08-03

## 2020-08-02 RX ADMIN — NICOTINE 1 PATCH: 21 PATCH, EXTENDED RELEASE TRANSDERMAL at 08:16

## 2020-08-02 RX ADMIN — PANTOPRAZOLE SODIUM 40 MG: 40 TABLET, DELAYED RELEASE ORAL at 08:16

## 2020-08-02 RX ADMIN — CARVEDILOL 6.25 MG: 6.25 TABLET, FILM COATED ORAL at 21:34

## 2020-08-02 RX ADMIN — SUCRALFATE 1 G: 1 SUSPENSION ORAL at 11:44

## 2020-08-02 RX ADMIN — SUCRALFATE 1 G: 1 SUSPENSION ORAL at 21:34

## 2020-08-02 RX ADMIN — SUCRALFATE 1 G: 1 SUSPENSION ORAL at 08:07

## 2020-08-02 RX ADMIN — ONDANSETRON 8 MG: 4 TABLET, ORALLY DISINTEGRATING ORAL at 14:02

## 2020-08-02 RX ADMIN — SUCRALFATE 1 G: 1 SUSPENSION ORAL at 16:30

## 2020-08-02 RX ADMIN — DEXTROAMPHETAMINE SACCHARATE, AMPHETAMINE ASPARTATE, DEXTROAMPHETAMINE SULFATE AND AMPHETAMINE SULFATE 20 MG: 2.5; 2.5; 2.5; 2.5 TABLET ORAL at 08:16

## 2020-08-02 RX ADMIN — OXYCODONE HYDROCHLORIDE 10 MG: 5 SOLUTION ORAL at 06:57

## 2020-08-02 RX ADMIN — LIDOCAINE HYDROCHLORIDE 30 ML: 20 SOLUTION ORAL; TOPICAL at 22:52

## 2020-08-02 RX ADMIN — OXYCODONE HYDROCHLORIDE 10 MG: 5 SOLUTION ORAL at 01:19

## 2020-08-02 RX ADMIN — ONDANSETRON 8 MG: 4 TABLET, ORALLY DISINTEGRATING ORAL at 20:35

## 2020-08-02 RX ADMIN — ACETAMINOPHEN 500 MG: 325 SOLUTION ORAL at 08:15

## 2020-08-02 RX ADMIN — CARVEDILOL 6.25 MG: 6.25 TABLET, FILM COATED ORAL at 08:16

## 2020-08-02 RX ADMIN — OXYCODONE HYDROCHLORIDE 10 MG: 5 SOLUTION ORAL at 21:34

## 2020-08-02 RX ADMIN — VANCOMYCIN HYDROCHLORIDE 1250 MG: 10 INJECTION, POWDER, LYOPHILIZED, FOR SOLUTION INTRAVENOUS at 16:31

## 2020-08-02 RX ADMIN — OXYCODONE HYDROCHLORIDE 10 MG: 5 SOLUTION ORAL at 14:02

## 2020-08-02 RX ADMIN — VANCOMYCIN HYDROCHLORIDE 1250 MG: 10 INJECTION, POWDER, LYOPHILIZED, FOR SOLUTION INTRAVENOUS at 08:04

## 2020-08-02 RX ADMIN — LORAZEPAM 1 MG: 1 TABLET ORAL at 21:34

## 2020-08-02 RX ADMIN — DIPHENHYDRAMINE HYDROCHLORIDE 25 MG: 25 CAPSULE ORAL at 15:23

## 2020-08-02 RX ADMIN — LIDOCAINE HYDROCHLORIDE 30 ML: 20 SOLUTION ORAL; TOPICAL at 10:01

## 2020-08-02 RX ADMIN — DIPHENHYDRAMINE HYDROCHLORIDE 25 MG: 25 CAPSULE ORAL at 06:58

## 2020-08-02 ASSESSMENT — ACTIVITIES OF DAILY LIVING (ADL)
ADLS_ACUITY_SCORE: 9

## 2020-08-02 ASSESSMENT — PAIN DESCRIPTION - DESCRIPTORS: DESCRIPTORS: ACHING

## 2020-08-02 ASSESSMENT — MIFFLIN-ST. JEOR: SCORE: 1835.62

## 2020-08-02 NOTE — PLAN OF CARE
Care from: [id number]      Neuro: A&Ox4  Respiratory: Breathing on room air   Cardiac: Afebrile. BP & HR WNL  GI/: Reporting abdominal discomfort w/ nausea. Pt w/  approx 210 ml of bile output from PEG (vents indep). PRN PO Zofran provided Q6.  Nutrition: On low fiber diet. Patient drank apple juice & some broth. BG checks 86 & 82  Pain: PRN oxy, PRN tylenol,  & GI cocktail given for pain management.   Lines: PIV SL. Infiltrated & replaced x1  Activity: Up ad vitor  Events this shift: No acute events     Plan: IR planning for line replacement Monday 8/3, anticipate discharge to home after that with home IV antibiotics through 8/6 (per MD note)

## 2020-08-02 NOTE — PLAN OF CARE
Pt admitted on 7/28 for bacteremia and port infection. Pt A&Ox4, up ad vitor. Pt complained of abdominal pain and received Oxycodone twice this evening. Pt also had a new Fentanyl patch placed. Nicoderm patch on the right arm. Pt on a low fiber diet, but only had a few sips of water and a popsicle. Pt refused his PPN and lipids due to limited IV access. Pt went through three PIVs this evening. Pt receives IV Vanco every 8 hours. Plan to place new port on Monday. Pt had a g-tube that was clamped, and pt used it to empty bile. Pt took a shower. Pt able to make his needs known. Continue with plan of care.

## 2020-08-02 NOTE — PROGRESS NOTES
[facility], [facility] ([place], [place]) Progress Note - Hospitalist Service, Gold 8       Date of Admission:  7/28/2020  Assessment & Plan    Mr. Acevedo is a 50yo M with PMHx notable for s/p bariatric surgery on chronic TPN, recurrent CLABSI, gastric ulcer, short gut syndrome, here for 1 week of fever, chills, and night sweats, concerning for central line infection and bacteremia.      Changes Today:  - Planning for IR Cm replacement 8/3 am, can likely discharge on IV Vanc through 8/6 after that     # MRSE Bacteremia  # Hx recurrent polymicrobial bacteremia  - Blood cultures 7/26 growing MRSE from Cm, repeat blood culture 7/28 also with MRSE  - TTE without vegetations on 7/29   - Cm port removed IR today 7/30, port tip with staph epi c/w blood cultures from port   - Pt having difficulty with PIVs, has required frequent replacement; unfortunately can only ran IV Vanc via central line or PIV, and is on central line holiday, so needs to continue having PIVs replaced for now until central access attained again  - Discussed with IR if they could try to replace port on Saturday afternoon to expedite discharge, but not able to be done until Monday 8/3  - RNCC confirmed patient does have coverage for IV antibiotics; can theoretically discharge following port replacement Monday 8/3 with plan to complete 7 days total IV Vanco from date of port reval (7/30-8/6)     # Hx Celestino-en-Y gastric bypass, esophagojejunostomy c/b short gut syndrome and chronic malnutrition  # Chronic Abdominal Pain:  Patient on TPN, typically through Cm. RD consulted, TPN orders initiated, transitioned to PPN when central access removed.   - RD consulted, appreciate recs, RD transitioned TPN to PPN while no central access on 7/30, pt intermittently resfusing to have PPN run as he thinks this increases his PIVs failure  - PTA GI cocktail TID prn ordered  - PPI daily  - Continue PTA Carafate  - Continue PTA  bowel regimen   - Increase prn Oxycodone to QID (home dose is BID) given acute pain while hospitalized, will discharge on PTA home opioid regimen  - Continue PTA Fentanyl patch     Chronic medical problems  #COPD  -Continue home albuterol PRN     #ADHD  -Continue home adderall 20mg Daily     #GERD  -Continue home pantoprazole 40mg daily  - GI cocktails TID prn per patient request     #Cardiomyopathy with reduced EF  -Coreg 6.25mg BID       Diet: parenteral nutrition - Clinimix E  Low Fiber Diet    DVT Prophylaxis: Low Risk/Ambulatory with no VTE prophylaxis indicated  Sanchez Catheter: not present  Code Status: Full Code           Disposition Plan   Expected discharge: 2 - 3 days, recommended to prior living arrangement once antibiotic plan established and SIRS/Sepsis treated.  Entered: Jael Zimmerman MD 08/02/2020, 12:52 PM       The patient's care was discussed with the Bedside Nurse and Patient.    Jael Zimmerman MD  Hospitalist Service, [address], [place]  Pager: [phone]  Please see sticky note for cross cover information  ______________________________________________________________________    Interval History   No acute events overnight. Continues to struggle to retain PIV access. Feeling fine, is eager to discharge. Is very hopeful they can put in a power port or port a cath tomorrow in  for more long term access. Denies fevers, chills, night sweats, pain, nausea, vomiting. No concerns, eager to leave as soon as possible tomorrow.     Data reviewed today: I reviewed all medications, new labs and imaging results over the last 24 hours.    Physical Exam   Vital Signs: Temp: 96.6  F (35.9  C) Temp src: Oral BP: 108/70 Pulse: 64   Resp: 18 SpO2: 96 % O2 Device: None (Room air)    Weight: 205 lbs 4.8 oz  GEN: pleasant, no acute distress, sitting up in bed  HEENT: no icterus, MMM  CV: RRR, normal s1,s2, no murmurs/rubs no heave. JVP not visible, azevedo removed  CHEST:  clear to ausculation bilaterally, no rales or wheezing  ABD:  normal active bowel sounds, tTP epigastric region with voluntary guarding  EXTR: DP 2+ bilaterally . No clubbing, cyanosis or edema.   NEURO: alert oriented, speech fluent/appropriate, motor grossly nonfocal    Data

## 2020-08-02 NOTE — PHARMACY-VANCOMYCIN DOSING SERVICE
Pharmacy Vancomycin Note  Date of Service 2020  Patient's  1970   49 year old, male    Indication: Bacteremia  Goal Trough Level: 15-20 mg/L  Day of Therapy: 6  Current Vancomycin regimen:  1250 mg IV q8hrs    Current estimated CrCl = Estimated Creatinine Clearance: 119.6 mL/min (based on SCr of 0.89 mg/dL).    Creatinine for last 3 days  2020:  6:42 AM Creatinine 0.82 mg/dL  2020:  6:42 AM Creatinine 0.86 mg/dL  2020:  5:16 AM Creatinine 0.89 mg/dL    Recent Vancomycin Levels (past 3 days)  2020:  3:37 PM Vancomycin Level 15.8 mg/L    Vancomycin IV Administrations (past 72 hours)                   vancomycin 1250 mg in 0.9% NaCl 250 mL intermittent infusion 1,250 mg (mg) 1,250 mg Given 20 0804     1,250 mg Given 20 2110     1,250 mg Given  1219     1,250 mg Given  0538     1,250 mg Given  0127     1,250 mg Given 20 1624     1,250 mg Given  0825     1,250 mg Given 20 2141                Nephrotoxins and other renal medications (From now, onward)    Start     Dose/Rate Route Frequency Ordered Stop    20 1300  vancomycin 1250 mg in 0.9% NaCl 250 mL intermittent infusion 1,250 mg      1,250 mg  over 180 Minutes Intravenous EVERY 8 HOURS 20 0832               Contrast Orders - past 72 hours (72h ago, onward)    None          Interpretation of levels and current regimen:  Trough level is  Therapeutic    Has serum creatinine changed > 50% in last 72 hours: No    Urine output:  unable to determine    Renal Function: Stable    Plan:  1.  Continue Current Dose  2.  Pharmacy will check trough levels as appropriate in 3-5 Days.    3. Serum creatinine levels will be ordered a minimum of twice weekly.      Thanks for the consult  Alicia Fernandez [facility]        .

## 2020-08-02 NOTE — PLAN OF CARE
"Assumed cares 2300 to 0700    /70 (BP Location: Left arm)   Pulse 64   Temp 96.6  F (35.9  C) (Oral)   Resp 18   Ht 1.803 m (5' 11\")   Wt 93.1 kg (205 lb 4.8 oz)   SpO2 96%   BMI 28.63 kg/m      A&o x4. VSS on RA. Endorses pain in \"gut\" & arm from the multiple IV attempts, describes pain as \"razor cuts & numbness\" like in arms. Oxycodone given x2, cold & heat pack provided for relief. Pt refused 0400 benadryl & vancomycin, stated he wanted to sleep & will take them when awake in morning. Independent. R PIV SL.    Continue to monitor pt.    "

## 2020-08-03 ENCOUNTER — HOME INFUSION (PRE-WILLOW HOME INFUSION) (OUTPATIENT)
Dept: PHARMACY | Facility: CLINIC | Age: 50
End: 2020-08-03

## 2020-08-03 ENCOUNTER — APPOINTMENT (OUTPATIENT)
Dept: INTERVENTIONAL RADIOLOGY/VASCULAR | Facility: CLINIC | Age: 50
DRG: 315 | End: 2020-08-03
Attending: PHYSICIAN ASSISTANT
Payer: COMMERCIAL

## 2020-08-03 VITALS
HEART RATE: 68 BPM | TEMPERATURE: 96.4 F | WEIGHT: 206.9 LBS | OXYGEN SATURATION: 100 % | RESPIRATION RATE: 14 BRPM | HEIGHT: 71 IN | BODY MASS INDEX: 28.97 KG/M2 | SYSTOLIC BLOOD PRESSURE: 132 MMHG | DIASTOLIC BLOOD PRESSURE: 83 MMHG

## 2020-08-03 LAB
ALBUMIN SERPL-MCNC: 3.2 G/DL (ref 3.4–5)
ALP SERPL-CCNC: 66 U/L (ref 40–150)
ALT SERPL W P-5'-P-CCNC: 20 U/L (ref 0–70)
ANION GAP SERPL CALCULATED.3IONS-SCNC: 5 MMOL/L (ref 3–14)
AST SERPL W P-5'-P-CCNC: 17 U/L (ref 0–45)
BACTERIA SPEC CULT: NO GROWTH
BILIRUB SERPL-MCNC: 0.4 MG/DL (ref 0.2–1.3)
BUN SERPL-MCNC: 12 MG/DL (ref 7–30)
CALCIUM SERPL-MCNC: 8.3 MG/DL (ref 8.5–10.1)
CHLORIDE SERPL-SCNC: 110 MMOL/L (ref 94–109)
CO2 SERPL-SCNC: 28 MMOL/L (ref 20–32)
CREAT SERPL-MCNC: 0.91 MG/DL (ref 0.66–1.25)
GFR SERPL CREATININE-BSD FRML MDRD: >90 ML/MIN/{1.73_M2}
GLUCOSE BLDC GLUCOMTR-MCNC: 101 MG/DL (ref 70–99)
GLUCOSE SERPL-MCNC: 94 MG/DL (ref 70–99)
INR PPP: 1.12 (ref 0.86–1.14)
MAGNESIUM SERPL-MCNC: 2.2 MG/DL (ref 1.6–2.3)
PHOSPHATE SERPL-MCNC: 3.6 MG/DL (ref 2.5–4.5)
POTASSIUM SERPL-SCNC: 3.4 MMOL/L (ref 3.4–5.3)
PREALB SERPL IA-MCNC: 21 MG/DL (ref 15–45)
PROT SERPL-MCNC: 6.4 G/DL (ref 6.8–8.8)
SODIUM SERPL-SCNC: 143 MMOL/L (ref 133–144)
SPECIMEN SOURCE: NORMAL

## 2020-08-03 PROCEDURE — 25000132 ZZH RX MED GY IP 250 OP 250 PS 637: Performed by: INTERNAL MEDICINE

## 2020-08-03 PROCEDURE — 25800030 ZZH RX IP 258 OP 636: Performed by: INTERNAL MEDICINE

## 2020-08-03 PROCEDURE — 99152 MOD SED SAME PHYS/QHP 5/>YRS: CPT

## 2020-08-03 PROCEDURE — 80053 COMPREHEN METABOLIC PANEL: CPT | Performed by: INTERNAL MEDICINE

## 2020-08-03 PROCEDURE — 27211193 ZZ H WOUND GLUE CR1

## 2020-08-03 PROCEDURE — 25000132 ZZH RX MED GY IP 250 OP 250 PS 637: Performed by: NURSE PRACTITIONER

## 2020-08-03 PROCEDURE — C1769 GUIDE WIRE: HCPCS

## 2020-08-03 PROCEDURE — 25000125 ZZHC RX 250: Performed by: INTERNAL MEDICINE

## 2020-08-03 PROCEDURE — 25000128 H RX IP 250 OP 636: Performed by: STUDENT IN AN ORGANIZED HEALTH CARE EDUCATION/TRAINING PROGRAM

## 2020-08-03 PROCEDURE — 40000556 ZZH STATISTIC PERIPHERAL IV START W US GUIDANCE

## 2020-08-03 PROCEDURE — 36415 COLL VENOUS BLD VENIPUNCTURE: CPT | Performed by: INTERNAL MEDICINE

## 2020-08-03 PROCEDURE — 99239 HOSP IP/OBS DSCHRG MGMT >30: CPT | Performed by: INTERNAL MEDICINE

## 2020-08-03 PROCEDURE — 27210908 ZZH NEEDLE CR4

## 2020-08-03 PROCEDURE — 84100 ASSAY OF PHOSPHORUS: CPT | Performed by: INTERNAL MEDICINE

## 2020-08-03 PROCEDURE — 84134 ASSAY OF PREALBUMIN: CPT | Performed by: INTERNAL MEDICINE

## 2020-08-03 PROCEDURE — 76937 US GUIDE VASCULAR ACCESS: CPT

## 2020-08-03 PROCEDURE — 02H633Z INSERTION OF INFUSION DEVICE INTO RIGHT ATRIUM, PERCUTANEOUS APPROACH: ICD-10-PCS | Performed by: PHYSICIAN ASSISTANT

## 2020-08-03 PROCEDURE — 85610 PROTHROMBIN TIME: CPT | Performed by: INTERNAL MEDICINE

## 2020-08-03 PROCEDURE — 00000146 ZZHCL STATISTIC GLUCOSE BY METER IP

## 2020-08-03 PROCEDURE — 25000132 ZZH RX MED GY IP 250 OP 250 PS 637: Performed by: STUDENT IN AN ORGANIZED HEALTH CARE EDUCATION/TRAINING PROGRAM

## 2020-08-03 PROCEDURE — 25000132 ZZH RX MED GY IP 250 OP 250 PS 637: Performed by: HOSPITALIST

## 2020-08-03 PROCEDURE — 83735 ASSAY OF MAGNESIUM: CPT | Performed by: INTERNAL MEDICINE

## 2020-08-03 PROCEDURE — C1751 CATH, INF, PER/CENT/MIDLINE: HCPCS

## 2020-08-03 PROCEDURE — 27210732 ZZH ACCESSORY CR1

## 2020-08-03 PROCEDURE — 25000128 H RX IP 250 OP 636: Performed by: INTERNAL MEDICINE

## 2020-08-03 PROCEDURE — 25000128 H RX IP 250 OP 636: Performed by: PHYSICIAN ASSISTANT

## 2020-08-03 PROCEDURE — 25000125 ZZHC RX 250: Performed by: PHYSICIAN ASSISTANT

## 2020-08-03 PROCEDURE — 40000141 ZZH STATISTIC PERIPHERAL IV START W/O US GUIDANCE

## 2020-08-03 PROCEDURE — 36558 INSERT TUNNELED CV CATH: CPT | Mod: LT

## 2020-08-03 RX ORDER — FENTANYL CITRATE 50 UG/ML
25-50 INJECTION, SOLUTION INTRAMUSCULAR; INTRAVENOUS EVERY 5 MIN PRN
Status: DISCONTINUED | OUTPATIENT
Start: 2020-08-03 | End: 2020-08-03 | Stop reason: HOSPADM

## 2020-08-03 RX ORDER — HEPARIN SODIUM,PORCINE 10 UNIT/ML
5 VIAL (ML) INTRAVENOUS
Status: COMPLETED | OUTPATIENT
Start: 2020-08-03 | End: 2020-08-03

## 2020-08-03 RX ORDER — POLYETHYLENE GLYCOL 3350 17 G
2 POWDER IN PACKET (EA) ORAL
Qty: 336 LOZENGE | Refills: 0 | Status: SHIPPED | OUTPATIENT
Start: 2020-08-03 | End: 2020-08-31

## 2020-08-03 RX ORDER — DIPHENHYDRAMINE HCL 25 MG
25 CAPSULE ORAL EVERY 8 HOURS
COMMUNITY
Start: 2020-08-03 | End: 2021-03-19

## 2020-08-03 RX ORDER — NALOXONE HYDROCHLORIDE 0.4 MG/ML
.1-.4 INJECTION, SOLUTION INTRAMUSCULAR; INTRAVENOUS; SUBCUTANEOUS
Status: DISCONTINUED | OUTPATIENT
Start: 2020-08-03 | End: 2020-08-03 | Stop reason: HOSPADM

## 2020-08-03 RX ORDER — HEPARIN SODIUM,PORCINE 10 UNIT/ML
5 VIAL (ML) INTRAVENOUS EVERY 24 HOURS
Status: CANCELLED | OUTPATIENT
Start: 2020-08-03

## 2020-08-03 RX ORDER — CEFAZOLIN SODIUM 1 G/50ML
1250 SOLUTION INTRAVENOUS EVERY 8 HOURS
Start: 2020-08-03 | End: 2020-08-07

## 2020-08-03 RX ORDER — HEPARIN SODIUM,PORCINE 10 UNIT/ML
5-10 VIAL (ML) INTRAVENOUS
Status: CANCELLED | OUTPATIENT
Start: 2020-08-03

## 2020-08-03 RX ORDER — FLUMAZENIL 0.1 MG/ML
0.2 INJECTION, SOLUTION INTRAVENOUS
Status: DISCONTINUED | OUTPATIENT
Start: 2020-08-03 | End: 2020-08-03 | Stop reason: HOSPADM

## 2020-08-03 RX ORDER — NICOTINE 21 MG/24HR
1 PATCH, TRANSDERMAL 24 HOURS TRANSDERMAL DAILY
Qty: 30 PATCH | Refills: 0 | Status: SHIPPED | OUTPATIENT
Start: 2020-08-03 | End: 2020-08-31

## 2020-08-03 RX ADMIN — Medication 2.5 ML: at 15:36

## 2020-08-03 RX ADMIN — OXYCODONE HYDROCHLORIDE 10 MG: 5 SOLUTION ORAL at 05:14

## 2020-08-03 RX ADMIN — CLINDAMYCIN PHOSPHATE 900 MG: 900 INJECTION, SOLUTION INTRAVENOUS at 15:05

## 2020-08-03 RX ADMIN — SUCRALFATE 1 G: 1 SUSPENSION ORAL at 16:14

## 2020-08-03 RX ADMIN — DIPHENHYDRAMINE HYDROCHLORIDE 25 MG: 25 CAPSULE ORAL at 07:51

## 2020-08-03 RX ADMIN — DIPHENHYDRAMINE HYDROCHLORIDE 25 MG: 25 CAPSULE ORAL at 00:22

## 2020-08-03 RX ADMIN — FENTANYL CITRATE 100 MCG: 50 INJECTION, SOLUTION INTRAMUSCULAR; INTRAVENOUS at 15:33

## 2020-08-03 RX ADMIN — ACETAMINOPHEN 500 MG: 325 SOLUTION ORAL at 01:17

## 2020-08-03 RX ADMIN — CARVEDILOL 6.25 MG: 6.25 TABLET, FILM COATED ORAL at 07:51

## 2020-08-03 RX ADMIN — OXYCODONE HYDROCHLORIDE 10 MG: 5 SOLUTION ORAL at 09:11

## 2020-08-03 RX ADMIN — LIDOCAINE HYDROCHLORIDE 30 ML: 20 SOLUTION ORAL; TOPICAL at 16:14

## 2020-08-03 RX ADMIN — ACETAMINOPHEN 500 MG: 325 SOLUTION ORAL at 05:14

## 2020-08-03 RX ADMIN — NICOTINE 1 PATCH: 21 PATCH, EXTENDED RELEASE TRANSDERMAL at 07:52

## 2020-08-03 RX ADMIN — VANCOMYCIN HYDROCHLORIDE 1250 MG: 10 INJECTION, POWDER, LYOPHILIZED, FOR SOLUTION INTRAVENOUS at 01:17

## 2020-08-03 RX ADMIN — PANTOPRAZOLE SODIUM 40 MG: 40 TABLET, DELAYED RELEASE ORAL at 07:52

## 2020-08-03 RX ADMIN — LIDOCAINE HYDROCHLORIDE 30 ML: 20 SOLUTION ORAL; TOPICAL at 06:37

## 2020-08-03 RX ADMIN — OXYCODONE HYDROCHLORIDE 10 MG: 5 SOLUTION ORAL at 01:17

## 2020-08-03 RX ADMIN — SUCRALFATE 1 G: 1 SUSPENSION ORAL at 07:53

## 2020-08-03 RX ADMIN — OXYCODONE HYDROCHLORIDE 10 MG: 5 SOLUTION ORAL at 16:14

## 2020-08-03 RX ADMIN — SUCRALFATE 1 G: 1 SUSPENSION ORAL at 12:01

## 2020-08-03 RX ADMIN — MIDAZOLAM 2 MG: 1 INJECTION INTRAMUSCULAR; INTRAVENOUS at 15:34

## 2020-08-03 RX ADMIN — DEXTROAMPHETAMINE SACCHARATE, AMPHETAMINE ASPARTATE, DEXTROAMPHETAMINE SULFATE AND AMPHETAMINE SULFATE 20 MG: 2.5; 2.5; 2.5; 2.5 TABLET ORAL at 07:51

## 2020-08-03 RX ADMIN — VANCOMYCIN HYDROCHLORIDE 1250 MG: 10 INJECTION, POWDER, LYOPHILIZED, FOR SOLUTION INTRAVENOUS at 10:48

## 2020-08-03 RX ADMIN — LORAZEPAM 1 MG: 1 TABLET ORAL at 16:44

## 2020-08-03 RX ADMIN — ONDANSETRON 8 MG: 4 TABLET, ORALLY DISINTEGRATING ORAL at 05:14

## 2020-08-03 ASSESSMENT — PAIN DESCRIPTION - DESCRIPTORS
DESCRIPTORS: ACHING

## 2020-08-03 ASSESSMENT — ACTIVITIES OF DAILY LIVING (ADL)
ADLS_ACUITY_SCORE: 9

## 2020-08-03 NOTE — PLAN OF CARE
"/85 (BP Location: Left arm)   Pulse 66   Temp 96.8  F (36  C) (Oral)   Resp 16   Ht 1.803 m (5' 11\")   Wt 93.8 kg (206 lb 14.4 oz)   SpO2 100%   BMI 28.86 kg/m      0730 - 1530  Neuro: A&Ox4.   Cardiac: SR. VSS.   Respiratory: Sating 100% on RA.  GI/: Adequate urine output.   Diet/appetite:NPO  Activity:  INDEPENDENT.  Pain: At acceptable level on current regimen.   Skin: No new deficits noted.  LDA's: none  New this shift: Pt's piv had extravasation x2 and Jael Zimmerman MD was informed each time and protocol. MD stated not to insert new piv to finish the vanco. At 1415 pt left for IR for line placement.  Plan: Continue with POC. Notify primary team with changes.  "

## 2020-08-03 NOTE — PLAN OF CARE
Status: Patient admitted w/ bacteremia and port infection.  VS: VSS on RA.  Neuros: A&Ox4.  GI/: NPO since midnight. C/o tolerable continuous nausea. PRN Zofran given x1. No BM. Voiding spontaneously. G tube clamped, empties independently.   IV: R PIV SL.   Activity: Up independently.  Pain: Abdominal/generalized pain controlled w/ PRN Tylenol and Oxycodone.   Respiratory/Trach: No issues.  Skin: Fentanyl patch on L arm. Nicotine patch on R arm.   Plan of Care: Plan for port/azevedo placement today. Scrub completed x3. Continue to monitor and follow POC.

## 2020-08-03 NOTE — DISCHARGE SUMMARY
[facility], [place]  Hospitalist Discharge Summary      Date of Admission:  7/28/2020  Date of Discharge:  8/3/2020  4:36 PM  Discharging Provider: Jael Zimmerman MD  Discharge Team: Hospitalist Service, Gold 8    Discharge Diagnoses   MRSE Bacteremia secondary to Central Venous Catheter Infection   Hx Recurrent Polymicrobial Bacteremia  Hx Celestino-en-Y gastric bypass, esophagojejunostomy c/b short gut syndrome and chronic malnutrition  Chronic Abdominal Pain:    Follow-ups Needed After Discharge     Unresulted Labs Ordered in the Past 30 Days of this Admission     Date and Time Order Name Status Description    8/3/2020 0000 Prealbumin In process     7/29/2020 0853 Blood culture Preliminary     7/29/2020 0853 Blood culture Preliminary     7/28/2020 1442 Blood culture Preliminary       These results will be followed up by Continue to follow.     Discharge Disposition   Discharged to home  Condition at discharge: Stable    Hospital Course    Mr. Acevedo is a 50yo M with PMHx notable for s/p bariatric surgery on chronic TPN, recurrent CLABSI, gastric ulcer, short gut syndrome, here for 1 week of fever, chills, and night sweats, concerning for central line infection and bacteremia.       # MRSE Bacteremia secondary to Central Venous Catheter Infection   # Hx recurrent polymicrobial bacteremia  Patient presented with fevers, chills, night sweats. Had been seen as outpatient with blood cultures drawn 7/26 growing MRSE from Cm. Follow up blood cultures cleared, with exception of Cm port removed IR 7/30, port tip with staph epi c/w blood cultures from port. TTE without vegetations on 7/29. Had 4 day line holiday d/t IR scheduling. Had repeat dual lumen Cm replaced on Monday 8/3 by IR, tolerated well. Will complete 7 days total IV Vanco from date of port reval (7/30-8/6) with coordination via [facility].      # Hx Celestino-en-Y gastric bypass, esophagojejunostomy c/b short gut syndrome  and chronic malnutrition  # Chronic Abdominal Pain:  Patient on TPN, typically through [facility]. RD consulted, TPN orders initiated, transitioned to PPN when central access removed and will return to baseline TPN on discharge via [facility] port. No changes to home medications at time of dicharge.     No changes to any other chronic problems or meds at time of discharge.       Consultations This Hospital Stay   PHARMACY TO DOSE VANCO  NUTRITION SERVICES ADULT IP CONSULT  PHARMACY TO DOSE VANCO  INFECTIOUS DISEASE GENERAL ADULT IP CONSULT  PHARMACY/NUTRITION TO START AND MANAGE TPN  VASCULAR ACCESS CARE ADULT IP CONSULT  PHARMACY IP CONSULT  INTERVENTIONAL RADIOLOGY ADULT/PEDS IP CONSULT  PHARMACY IP CONSULT  VASCULAR ACCESS CARE ADULT IP CONSULT  VASCULAR ACCESS CARE ADULT IP CONSULT  VASCULAR ACCESS CARE ADULT IP CONSULT  INTERVENTIONAL RADIOLOGY ADULT/PEDS IP CONSULT  VASCULAR ACCESS CARE ADULT IP CONSULT  VASCULAR ACCESS CARE ADULT IP CONSULT  VASCULAR ACCESS CARE ADULT IP CONSULT  VASCULAR ACCESS CARE ADULT IP CONSULT  VASCULAR ACCESS CARE ADULT IP CONSULT  VASCULAR ACCESS CARE ADULT IP CONSULT    Code Status   Full Code    Time Spent on this Encounter      I, Jael Zimmerman MD, personally saw the patient today and spent greater than 30 minutes discharging this patient.    Jael Zimmerman MD  [facility], [place]  ______________________________________________________________________    Physical Exam   Vital Signs: Temp: 96.8  F (36  C) Temp src: Oral BP: 129/85 Pulse: 66 Heart Rate: 63 Resp: 16 SpO2: 100 % O2 Device: None (Room air)    Weight: 206 lbs 14.4 oz  GEN: pleasant, no acute distress, sitting up in bed  HEENT: no icterus, MMM  CV: RRR, normal s1,s2, no murmurs/rubs no heave. JVP not visible, [place]edo replaced, site c/d/i   CHEST: clear to ausculation bilaterally, no rales or wheezing  ABD:  normal active bowel sounds, tTP epigastric region with voluntary guarding  EXTR:  DP 2+ bilaterally . No clubbing, cyanosis or edema.   NEURO: alert oriented, speech fluent/appropriate, motor grossly nonfocal       Primary Care Physician   Chuy Isaac    Discharge Orders     Significant Results and Procedures   Most Recent 3 CBC's:  Recent Labs   Lab Test 07/28/20  1607 07/14/20  1120 07/02/20  1200   WBC 6.8 5.9 9.1   HGB 12.9* 11.0* 12.3*   MCV 92 93 92    190 186     Most Recent 3 BMP's:  Recent Labs   Lab Test 08/03/20  0600 08/02/20  0516 08/01/20  0642 07/31/20  0642     --  140 138   POTASSIUM 3.4  --  3.7 3.6   CHLORIDE 110*  --  106 107   CO2 28  --  30 26   BUN 12  --  16 12   CR 0.91 0.89 0.86 0.82   ANIONGAP 5  --  4 6   SILAS 8.3*  --  8.8 8.5   GLC 94  --  89 86     Most Recent 2 LFT's:  Recent Labs   Lab Test 08/03/20  0600 07/31/20  0642   AST 17 11   ALT 20 24   ALKPHOS 66 77   BILITOTAL 0.4 0.6     Most Recent 3 INR's:  Recent Labs   Lab Test 08/03/20  0600 07/31/20  0642 07/30/20  0430   INR 1.12 1.07 0.72*       Discharge Medications   Current Discharge Medication List      CONTINUE these medications which have NOT CHANGED    Details   acetaminophen (TYLENOL) 32 mg/mL liquid Take 15.65 mLs (500 mg) by mouth every 4 hours as needed for fever or mild pain  Qty: 455 mL, Refills: 1    Associated Diagnoses: S/P bariatric surgery      alteplase 2 mg/2 mL (in 10 mL syringe) Inject 1 mg into the vein as needed      amphetamine-dextroamphetamine (ADDERALL) 20 MG tablet TAKE ONE TABLET BY MOUTH ONCE DAILY  Qty: 30 tablet, Refills: 0    Associated Diagnoses: ADHD (attention deficit hyperactivity disorder), inattentive type      carvedilol (COREG) 6.25 MG tablet Take 6.25 mg by mouth 2 times daily (with meals)      cyanocobalamin (CYANOCOBALAMIN) 1000 MCG/ML injection Inject 1 mL (1,000 mcg) into the muscle every 30 days  Qty: 1 mL, Refills: 11    Associated Diagnoses: Vitamin B12 deficiency (non anemic)      !! [facility] [place] [facility] MANAGED PATIENT Contact [facility]  [facility] for patient specific medication information at [phone] on admission and discharge from the hospital.  Phones are answered 24 hours a day 7 days a week 365 days a year.    Providers - Choose \"CONTINUE HOME MED (no script)\" at discharge if patient treatment with home infusion will continue.    Comments: Resume prior to admission TPN/Lipids/saline  Associated Diagnoses: S/P bariatric surgery      lidocaine, alum & mag hydroxide-simethicone GI Cocktail 30 ml daily as needed for mouth/stomach pain.  Qty: 1 Bottle, Refills: 1    Associated Diagnoses: Gastric bypass status for obesity      LORazepam (ATIVAN) 1 MG tablet TAKE 1 TABLET (1MG) BY MOUTH AS NEEDED FOR ANXIETY WITH TPN AND MEDICATIONS . JUST ONCE A DAY (30 TO LAST 30 DAYS)  Qty: 30 tablet, Refills: 0    Associated Diagnoses: ADHD (attention deficit hyperactivity disorder), inattentive type      Multiple Vitamin (INFUVITE ADULT) injection Inject 10 mLs into the vein daily      Needle, Disp, (BD DISP NEEDLES) 27G X 1/2\" MISC 1 Device every 30 days Use for cyanocobalamin injection once q 30 days.  Qty: 3 each, Refills: 4    Associated Diagnoses: Bariatric surgery status      ondansetron (ZOFRAN-ODT) 8 MG ODT tab DISSOLVE ONE TABLET ON TONGUE EVERY 8 HOURS AS NEEDED FOR NAUSEA  Qty: 90 tablet, Refills: 1    Associated Diagnoses: Nausea      !! order for DME Equipment being ordered: Bilateral knee high chronic venous insufficiency stockings--  mild-moderate pressures.  Qty: 4 each, Refills: 5    Associated Diagnoses: Bilateral edema of lower extremity      !! order for DME Equipment being ordered: Urine Drainage bag, leg.  Qty: 15 Units, Refills: 11    Associated Diagnoses: S/P bariatric surgery      oxyCODONE (ROXICODONE) 5 MG/5ML solution Take 5-10 mLs (5-10 mg) by mouth 2 times daily as needed for pain Max of 20mg/day. 30 day supply. May fill on 8/8/20 to start on 8/10/20  Qty: 600 mL, Refills: 0    Associated Diagnoses: Chronic abdominal pain    "   pantoprazole (PROTONIX) 40 MG EC tablet Take 1 tablet (40 mg) by mouth daily  Qty: 30 tablet, Refills: 8    Associated Diagnoses: S/P bariatric surgery      !! parenteral nutrition - PTA/DISCHARGE ORDER Patient receives 2050 mL TPN every 14 hours through PICC at [facility].      sodium chloride 0.9% infusion Inject 1,000-2,000 mLs into the vein as needed       sucralfate (CARAFATE) 1 GM/10ML suspension Take 1 g by mouth 4 times daily as needed      vitamin D2 (ERGOCALCIFEROL) 07981 units (1250 mcg) capsule Take 1 capsule (50,000 Units) by mouth once a week  Qty: 12 capsule, Refills: 3    Associated Diagnoses: Vitamin D deficiency      albuterol (PROAIR HFA/PROVENTIL HFA/VENTOLIN HFA) 108 (90 Base) MCG/ACT inhaler Inhale 2 puffs into the lungs every 4 hours as needed for shortness of breath / dyspnea or wheezing  Qty: 1 Inhaler, Refills: 1    Comments: Pharmacy may dispense brand covered by insurance (Proair, or proventil or ventolin or generic albuterol inhaler)  Associated Diagnoses: Productive cough      fentaNYL (DURAGESIC) 25 mcg/hr 72 hr patch Place 1 patch onto the skin every 48 hours remove old patch.   May fill 8/1/20 and start 8/3/20  Qty: 15 patch, Refills: 0    Associated Diagnoses: Chronic abdominal pain; Chronic pain syndrome      naloxone (NARCAN) 4 MG/0.1ML nasal spray Spray 1 spray (4 mg) into one nostril alternating nostrils as needed for opioid reversal every 2-3 minutes until assistance arrives  Qty: 0.2 mL, Refills: 0    Associated Diagnoses: Chronic pain syndrome       !! - Potential duplicate medications found. Please discuss with provider.      STOP taking these medications       polyethylene glycol (MIRALAX) powder Comments:   Reason for Stopping:             Allergies   Allergies   Allergen Reactions     Bactrim [Sulfamethoxazole W/Trimethoprim] Rash     Penicillins Anaphylaxis     Please see Antimicrobial Management Team allergy assessment note 10/10/2018. Patient reported  tolerating amoxicillin.     Doxycycline Rash     Vancomycin Rash     Rash after receiving vancomycin 3/28/16 (red man's?). Tolerated with slower infusion and diphenhydramine premed.

## 2020-08-03 NOTE — PHARMACY
[facility]  Parenteral ANtibiotic Review at Departure from Acute [facility]     Antimicrobial Stewardship Program - A joint venture between [facility] and  Physicians to optimize antibiotic management.  NOT a formal consult - Restricted Antimicrobial Review     Patient: Parker Acevedo  MRN: [id number]  Allergies: Bactrim [sulfamethoxazole w/trimethoprim]; Penicillins; Doxycycline; and Vancomycin    Brief Summary: Parker Acevedo is a 49 year old male with PMHx of RYGB esophagojejunostomy c/b short gut syndrome and chronic malnutrition (on TPN) with recurrent polymicrobial bacteremia and several central line replacements in the past, one episode of candidemia (1/2019) who was admitted to [facility] after 2/2 blood cultures drawn from Cm on 7/26/2020 grew MRSE.     He reported fevers to 101.3F at home occurring during or shortly after TPN infusion, associated with arthralgias, sweats, nausea, and fatigue. He notes mild erythema at Cm site and twinges of pain. Cm line was removed on 7/30/2020 (tip grew staph epi). 1/2 repeat blood cultures from 7/28 growing MRSE and Corynebacterium species. 7/29 and 7/30 blood cultures remain no growth to date. Port placed on 8/3. Patient was started on IV vancomycin therapy for which he remains on presently with plans to complete course as an outpatient.     Antimicrobial Dose/Route/Frequency Duration/Indication Start Date End Date   Vancomycin IV 1250 mg/IV/every 8 hours 7 days/bacteremia 7/31/2020 (date after Cm line removal) 8/6/2020     Laboratory Tests: (none needed (<1 week outpatient therapy))   Lab Monitoring Frequency: N/A    Therapeutic Drug Monitoring: (none needed (<1 week outpatient therapy))   Therapeutic Drug Monitoring Frequency: N/A    Miscellaneous Drug Monitoring: N/A      Line Type: Port(placed 8/3/2020)    Reassess Line/Pull Line Date: N/A (will utilize line for  TPN)    First Dose Received in Controlled Setting: Yes    Jyothi Burr, PharmD, BCIDP  Pager: [phone]    Vital Signs/Clinical Features:  Vitals         08/01 0700  -  08/02 0659 08/02 0700  -  08/03 0659 08/03 0700  -  08/03 1326   Most Recent    Temp ( F) 96.6 -  97.7    96.8 -  97.6       96.8 (36)    Pulse   74    64 -  66       66    Heart Rate   63 -  68       63    Resp 16 -  18    16 -  18       16    /69 -  122/81    108/70 -  129/85       129/85    SpO2 (%) 95 -  100    96 -  100       100            Labs  Estimated Creatinine Clearance: 117.4 mL/min (based on SCr of 0.91 mg/dL).  Recent Labs   Lab Test 07/28/20  1607 07/30/20  0430 07/31/20  0642 08/01/20  0642 08/02/20  0516 08/03/20  0600   CR 0.68 0.69 0.82 0.86 0.89 0.91       Recent Labs   Lab Test 10/17/19  1340  10/30/19  1330 11/02/19  1853  03/10/20  1245  05/19/20  1340 06/02/20  1450 06/16/20  1410 07/02/20  1200 07/14/20  1120 07/28/20  1607   WBC 6.7   < > 9.5 10.4   < > 5.7   < > 7.1 10.6 6.5 9.1 5.9 6.8   ANEU 4.1  --  6.6 6.2   < > 3.1   < > 4.2 6.6 3.7 6.1 3.0 4.0   ALYM 1.4  --  1.7 2.7   < > 1.5   < > 1.7 2.4 1.7 1.8 1.7 1.9   DACIA 0.8  --  1.0 1.2   < > 0.9   < > 0.9 1.3 0.9 1.0 1.0 0.8   AEOS 0.2  --  0.1 0.1  --  0.2  --   --   --  0.2  --   --  0.1   HGB 11.6*   < > 13.3 13.3   < > 10.8*   < > 11.5* 12.7* 10.6* 12.3* 11.0* 12.9*   HCT 35.9*   < > 41.4 42.4   < > 34.3*   < > 36.0* 39.2* 33.7* 37.9* 34.8* 41.6   MCV 96   < > 95 96   < > 96   < > 94 91 95 92 93 92      < > 185 179   < > 170   < > 187 213 127* 186 190 189    < > = values in this interval not displayed.       Recent Labs   Lab Test 07/02/20  1200 07/14/20  1120 07/28/20  1607 07/30/20  0430 07/31/20  0642 08/03/20  0600   BILITOTAL 0.5 0.3 0.4 0.5 0.6 0.4   ALKPHOS 72 73 69 68 77 66   ALBUMIN 3.4 3.3* 3.3* 3.2* 3.5 3.2*   AST 13 21 17 13 11 17   ALT 21 30 26 24 24 20       Recent Labs   Lab Test 07/29/15  2356  01/16/16  0224  10/25/16  1108   06/28/17  2222  09/19/17  1929  09/21/17  0653  09/24/17  1900  01/20/18  1030 01/23/18  0845  06/01/18  1807  05/12/19  2111  05/14/19  1145 05/23/19  1606  09/29/19  1559 10/29/19  1210 10/30/19  1330 11/02/19  1853 04/28/20  1245 05/05/20  1218 07/28/20  1607 07/28/20  1723   PCAL 0.06  --  <0.05  <0.05 ng/ml  Normal  Recommendation: Very low risk of bacterial infection.   Discourage antibiotics unless strong clinical suspicion for serious infection.    --  0.23  --   --   --  0.17  --  0.08  --   --   --   --   --   --  0.09  --   --   --   --   --   --   --   --   --   --   --   --   --   --    LACT 1.3   < >  --    < > 1.0   < > 0.4*   < > 1.1  --   --   --   --    < >  --   --   --  0.9   < > 0.7  --   --  1.7  --  1.0  --   --  1.5  --   --  1.6 1.2   CRP 6.0   < > 70.0*   < >  --    < > 53.0*   < > 57.0*   < > 88.0*   < > 26.6*   < > 5.4 <2.9   < > 26.0*   < >  --    < >  --   --    < >  --  <2.9 <2.9 <2.9 <2.9 <2.9 <2.9  --    SED  --    < > 29*   < >  --    < > 26*  --   --   --   --   --  31*  --  11 10  --   --   --   --   --  13  --   --   --   --   --   --   --   --  10  --     < > = values in this interval not displayed.       Recent Labs   Lab Test 08/02/20  1537   VANCOMYCIN 15.8       Culture Results:  7-Day Micro Results       Procedure Component Value Units Date/Time    Catheter Tip Culture Aerobic Bacterial     Order Status:  No result Lab Status:  No result     Specimen:  Catheter tip     Catheter Tip Culture Aerobic Bacterial [[id number]]  (Abnormal) Collected:  07/30/20 1145    Order Status:  Completed Lab Status:  Final result Updated:  08/01/20 1350    Specimen:  Catheter tip      Specimen Description Catheter tip     Culture Micro <15 colonies   Staphylococcus epidermidis  Susceptibility testing not routinely done      Blood culture [[id number]] Collected:  07/29/20 0918    Order Status:  Completed Lab Status:  Preliminary result Updated:  08/03/20 0217    Specimen:  Blood      Specimen  Description Blood Red port     Culture Micro No growth after 5 days    Blood culture [[id number]] Collected:  07/29/20 0910    Order Status:  Completed Lab Status:  Preliminary result Updated:  08/03/20 0217    Specimen:  Blood      Specimen Description Blood Right Hand     Culture Micro No growth after 5 days    Blood culture     Order Status:  Canceled Lab Status:  No result     Specimen:  Blood     Blood culture     Order Status:  Canceled Lab Status:  No result     Specimen:  Blood     Streptococcus A Rapid Scr w Reflx to PCR [[id number]] Collected:  07/28/20 1729    Order Status:  Completed Lab Status:  Edited Result - FINAL Updated:  07/28/20 1806    Specimen:  Throat      Strep Specimen Description Throat     Streptococcus Group A Rapid Screen Negative     Comment: No Group A streptococcal antigen detected by immunoassay. Confirmatory testing   in progress.         Group A Streptococcus PCR Throat Swab [[id number]] Collected:  07/28/20 1729    Order Status:  Completed Lab Status:  Final result Updated:  07/28/20 2132    Specimen:  Throat      Specimen Description Throat     Strep Group A PCR Not Detected     Comment: Group A Streptococcus DNA is not detected.  FDA approved assay performed using [employer] GeneXpert real-time PCR.         Blood culture [[id number]] Collected:  07/28/20 1723    Order Status:  Completed Lab Status:  Final result Updated:  08/03/20 0216    Specimen:  Blood      Specimen Description Blood PURPLE PORT     Culture Micro No growth    Blood culture [[id number]]  (Abnormal)  (Susceptibility) Collected:  07/28/20 1607    Order Status:  Completed Lab Status:  Preliminary result Updated:  08/03/20 1021    Specimen:  Blood from Cm      Specimen Description Blood Red port     Culture Micro Cultured on the 2nd day of incubation:  Gram positive cocci in clusters  Upon further review, there is not any gram positive cocci in clusters present in this   blood culture.        Cultured on the 2nd day of  incubation:  Corynebacterium species  Identification obtained by MALDI-TOF mass spectrometry research use only database. Test   characteristics determined and verified by the [facility]   ([facility]) [place], [place].        Critical Value/Significant Value, preliminary result only, called to and read back by  Richie Nugent RN 2223 7/30/20 AM        Updated report called to and read back by  Spring Lugo RN at 1300 on 8.2.20 ME        (Note)  NEGATIVE for the following: Staphylococcus spp., Staph aureus, Staph  epidermidis, Staph lugdunensis, Streptococcus spp., Strep pneumoniae,  Strep pyogenes, Strep agalactiae, Strep anginosus group, Enterococcus  faecalis, Enterococcus faecium, and Listeria spp. by Fitt[employer]  multiplex nucleic acid test. Final identification and antimicrobial  susceptibility testing will be verified by standard methods.    Critical Value/Significant Value called to and read back by LIZET BAUGH RN [id number] 07.31.20 CF      Susceptibility       Corynebacterium species (2)       Antibiotic Interpretation Sensitivity Method Status    CEFTRIAXONE Sensitive 0.064 ug/mL E-TEST Preliminary    CLINDAMYCIN Resistant >256.0 ug/mL E-TEST Preliminary    MEROPENEM Sensitive 0.064 ug/mL E-TEST Preliminary    PENICILLIN Intermediate 0.125 ug/mL E-TEST Preliminary    VANCOMYCIN Sensitive 0.75 ug/mL E-TEST Preliminary    Trimethoprim/Sulfa Resistant >32.0 ug/mL E-TEST Preliminary                       Blood culture     Order Status:  Canceled Lab Status:  No result     Specimen:  Blood             Recent Labs   Lab Test 05/24/19  2316 09/29/19  1625 10/04/19  1344 11/03/19  0025 07/28/20  1905   URINEPH 6.5 7.5* 6.5 6.0 6.5   NITRITE Negative Negative Negative Negative Negative   LEUKEST Negative Negative Trace* Negative Negative   WBCU 1 0 1 <1 <1                   Recent Labs   Lab Test 07/15/12  1554   CDBPCT Negative: Clostridium difficile target DNA sequences NOT detected, presumed   negative for Clostridium difficile toxin B or the number of bacteria present  may be below the limit of detection for the test.  FDA approved assay performed using [employer] GeneXpert real-time PCR.  A negative result does not exclude actual disease due to Clostridium difficile  and may be due to improper collection, handling and storage of the specimen or  the number of organisms in the specimen is below the detection limit of the  assay.       Imaging: Echo Complete    Result Date: 2020  [id number]^MYLENE^RAFITA    [facility] [address] [place] [id number]  Name: SARA HASSAN MRN: [id number] : 1970 Study Date: 2020 01:21 PM Age: 49 yrs Gender: Male Patient Location: [facility] Reason For Study: Endocarditis Ordering Physician: RAFITA CHAKRABORTY Performed By: ETTA Miguel  BSA: 2.1 m2 Height: 71 in Weight: 208 lb BP: 111/74 mmHg _____________________________________________________________________________ __   Procedure Complete Portable Echo Adult. Poor acoustic windows. _____________________________________________________________________________ __   Interpretation Summary No significant valvular abnormalities were noted. No vegetation or mass identified, however this does not exclude endocarditis. Poor acoustic windows. _____________________________________________________________________________ __   Left Ventricle Global and regional left ventricular function is normal with an EF of 55-60%. Left ventricular wall thickness is normal. Left ventricular size is normal. Left ventricular diastolic function is normal. No regional wall motion abnormalities are seen.  Right Ventricle Right ventricular function, chamber size, wall motion, and thickness are normal.  Atria The right atria appears normal. Mild left atrial enlargement is present. The atrial septum is intact as assessed by color Doppler  .   Mitral Valve The mitral valve is normal.  Aortic Valve The valve leaflets are not well visualized. On Doppler interrogation, there is no significant stenosis or regurgitation.  Tricuspid Valve The tricuspid valve is normal. Trace tricuspid insufficiency is present. Pulmonary artery systolic pressure cannot be assessed.  Pulmonic Valve The pulmonic valve cannot be assessed.  Vessels The thoracic aorta is normal. The pulmonary artery cannot be assessed. The inferior vena cava is normal.  Pericardium No pericardial effusion is present.  _____________________________________________________________________________ __ MMode/2D Measurements & Calculations IVSd: 0.81 cm LVIDd: 5.2 cm LVIDs: 4.2 cm LVPWd: 0.82 cm  FS: 20.1 % LV mass(C)d: 150.1 grams LV mass(C)dI: 70.0 grams/m2 Ao root diam: 3.9 cm LVOT diam: 2.5 cm LVOT area: 4.9 cm2 LA Volume (BP): 78.6 ml LA Volume Index (BP): 36.7 ml/m2 RWT: 0.31   Doppler Measurements & Calculations MV E max greta: 53.1 cm/sec MV A max greta: 47.1 cm/sec MV E/A: 1.1  MV dec slope: 222.0 cm/sec2 MV dec time: 0.24 sec E/E' av.1 Lateral E/e': 4.2 Medial E/e': 6.0  _____________________________________________________________________________ __    Report approved by: Sudhir Stover 2020 03:04 PM      Xr Chest Port 1 View    Result Date: 2020  Portable chest INDICATION: Fever COMPARISON: 2019 FINDINGS: Heart size appears normal. A new right IJ central venous catheter is present with tip in the SVC. No pneumothorax. No areas of consolidation. Bony structures appear grossly intact.     IMPRESSION: No acute consolidation. ELENI RECINOS MD    Ir Cvc Tunnel Removal Right    Result Date: 2020  Procedure date: 20. Procedure: Tunneled central venous catheter removal. History: The patient had a  right internal jugular single lumen tunneled central venous catheter which had been used for TPN. Patient now admitted with bacteremia, and catheter removal was  requested. Preop diagnosis: Bacteremia. Postop diagnosis: Same. : Best Enriquez PA-C. Assist: FATEMEH Chávez. Medications: 1% lidocaine, 2 cc subcutaneously. Face-to-face sedation time: None. Nursing: Patient was monitored by IR nursing staff under my supervision, and remained stable throughout the procedure. Findings: Physical examination demonstrated no warmth, erythema, fluctuance, discharge, or tenderness at the catheter exit site. The catheter entry site was prepped and draped in usual sterile fashion. The retention sutures were cut. Following infiltration around the catheter with 1% lidocaine, traction was used to free the cuff from the subcutaneous tissues. The catheter was then removed intact and without difficulty. The catheter tip was transected and sent for culture. Pressure was applied over the venotomy site as well as along the tract until hemostasis was achieved. Site was cleansed and dressed with a sterile bandage. The procedure was well tolerated, with no immediate complications. The patient was returned to inpatient patient care unit in stable condition. Fluoroscopy time: None. Estimated blood loss: 1 cc. Specimen: Catheter tip sent for culture     Impression: right IJ, 5 Mongolian, single lumen tunneled central venous catheter removed intact and without difficulty. Lab results pending. Plan: Patient to remain upright for 2 hours. No strenuous activity for 3 days. Keep site clean, dry, and covered for 48 hours.  Change the dressing if it becomes soiled, wet, or blood soaked.  After 48 hours the dressing may be removed and the site may be left uncovered and may get wet if the site has healed.  If the site has not healed keep it covered and dry with daily dressing changes until healed.  Monitor the supraclavicular site for swelling, as well as the catheter tract and exit site for bleeding or infection as instructed.  Phone Interventional Radiology if signs or symptoms develop.  If the site  bleeds, sit upright and hold pressure on the site for 10 minutes.  If it continues to bleed, continue holding pressure and phone the Interventional Radiology. Approximately 10 minutes of direct face-to-face time occurred with the patient during this encounter. Attestation: The physician assistant (PA) who performed this procedure and signed the above report is licensed to practice in the [facility] pursuant to [place] Statute 147A.09.  This includes meeting the Statute and [place] Board of Medical Practice requirement of an active Delegation Agreement, which documents delegation of services by primary and alternate supervising physicians. All services rendered are performed under a collaborative agreement with Dr. Stas Colindres, Director of Interventional Radiology, [facility]. ROXY PRITCHETT PA-C

## 2020-08-03 NOTE — IR NOTE
Patient Name: Parker Acevedo  Medical Record Number: [id number]  Today's Date: 8/3/2020    Procedure: tunneled central venous catheter placement  Proceduralist: KACIE Alejandro PA-C    Procedure Start: 1505  Procedure end: 1520  Sedation medications administered: fentanyl 100 mcg., versed  2 mg.    Report given to:  RN    Other Notes: Pt arrived to IR room 5  from . Consent reviewed. Pt denies any questions or concerns regarding procedure. Pt positioned  Supine  and monitored per protocol. Pt tolerated procedure without any noted complications. Pt transferred back to .

## 2020-08-03 NOTE — PLAN OF CARE
Pt admitted on 7/28 for bacteremia and port infection. Pt A&Ox4, up ad vitor. Pt complained of abdominal pain and received Oxycodone once this evening. Pt also had a Fentanyl patch on his left arm. Nicoderm patch on the right arm. Pt on a low fiber diet, but did not eat anything. Pt refused his PPN and lipids due to limited IV access. Pt went through two PIVs this evening. Pt receives IV Vanco every 8 hours. Plan to place new port on Monday. Pt had a g-tube that was clamped, and pt used it to empty bile. Pt took a shower. Pt able to make his needs known. Continue with plan of care.

## 2020-08-03 NOTE — PROCEDURES
[facility], [place]    Procedure: IR CVC TUNNEL PLACEMENT    Date/Time: 8/3/2020 3:26 PM  Performed by: Lisandro Alejandro PA-C  Authorized by: Lisandro Alejandro PA-C     UNIVERSAL PROTOCOL   Site Marked: No  Prior Images Obtained and Reviewed:  Yes  Required items: Required blood products, implants, devices and special equipment available    Patient identity confirmed:  Verbally with patient, arm band, provided demographic data and hospital-assigned identification number  Patient was reevaluated immediately before administering moderate or deep sedation or anesthesia  Confirmation Checklist:  Patient's identity using two indicators, relevant allergies, procedure was appropriate and matched the consent or emergent situation and correct equipment/implants were available  Time out: Immediately prior to the procedure a time out was called    Universal Protocol: the Joint Commission Universal Protocol was followed    Preparation: Patient was prepped and draped in usual sterile fashion           ANESTHESIA    Anesthesia: Local infiltration  Local Anesthetic:  Lidocaine 1% without epinephrine  Anesthetic Total (mL):  10      SEDATION    Patient Sedated: Yes    Sedation Type:  Moderate (conscious) sedation  Sedation:  Fentanyl and midazolam  Vital signs: Vital signs monitored during sedation    Fluoroscopy Time: 1 minute(s)  See dictated procedure note for full details.  Findings: 100 mcg and 2 mg, 15 minutes, tolerated well    Specimens: none    Complications: None    Condition: Stable    Plan: 1 hour monitored recovery. DL TCVC ready for immediate use, heparin locked.    PROCEDURE   Patient Tolerance:  Patient tolerated the procedure well with no immediate complications  Describe Procedure: 9.5 Fr 28 cm right EJ double lumen tunneled central venous catheter with tip in right atrium. Functions well.  Length of time physician/provider present for 1:1 monitoring during sedation: 15

## 2020-08-03 NOTE — PLAN OF CARE
Discharged to: Home at 1650. Up ad vitor. Left unit w/ wife  Belongings: Packed by patient   AVS (After Visit Summary) discussed with: Patient & wife

## 2020-08-03 NOTE — PRE-PROCEDURE
GENERAL PRE-PROCEDURE:   Procedure:  Tunneled line placement  Date/Time:  8/3/2020 2:22 PM    Risks and benefits: Risks, benefits and alternatives were discussed    Consent given by:  Patient  Patient states understanding of procedure being performed: Yes    Patient's understanding of procedure matches consent: Yes    Procedure consent matches procedure scheduled: Yes    Expected level of sedation:  Moderate  Appropriately NPO:  Yes  ASA Class:  Class 2- mild systemic disease, no acute problems, no functional limitations  Mallampati  :  Grade 1- soft palate, uvula, tonsillar pillars, and posterior pharyngeal wall visible  Lungs:  Lungs clear with good breath sounds bilaterally  Heart:  Normal heart sounds and rate  History & Physical reviewed:  History and physical reviewed and no updates needed  Statement of review:  I have reviewed the lab findings, diagnostic data, medications, and the plan for sedation

## 2020-08-04 ENCOUNTER — PATIENT OUTREACH (OUTPATIENT)
Dept: CARE COORDINATION | Facility: CLINIC | Age: 50
End: 2020-08-04

## 2020-08-04 ENCOUNTER — TELEPHONE (OUTPATIENT)
Dept: INTERNAL MEDICINE | Facility: CLINIC | Age: 50
End: 2020-08-04

## 2020-08-04 ENCOUNTER — MYC MEDICAL ADVICE (OUTPATIENT)
Dept: PALLIATIVE MEDICINE | Facility: CLINIC | Age: 50
End: 2020-08-04

## 2020-08-04 ENCOUNTER — PATIENT OUTREACH (OUTPATIENT)
Dept: NURSING | Facility: CLINIC | Age: 50
End: 2020-08-04
Payer: COMMERCIAL

## 2020-08-04 ASSESSMENT — ACTIVITIES OF DAILY LIVING (ADL): DEPENDENT_IADLS:: MEDICATION MANAGEMENT;TRANSPORTATION;SHOPPING

## 2020-08-04 NOTE — LETTER
[address] [facility] [address]   [place] CARE COORDINATION  August 4, 2020        Parker Acevedo  [address]   [place] [address]          Dear Miguel Canales is an updated Complex Care Plan for your continued enrollment in Care Coordination. Please let us know if you have additional questions, concerns, or goals that we can assist with.    Sincerely,    Melissa Behl BSN, RN, PHN, CCM  Primary Care Clinical RN Care Coordinator  [facility]   [phone]          [facility]  Complex Care Plan  About Me:    Patient Name:  Parker Acevedo    YOB: 1970  Age:         49 year old   [place] MRN:    [id number] Telephone Information:  Home Phone [phone]   Mobile [phone]       Address:  [address]   [place] [address] Email address:  [email or web address]      Emergency Contact(s)    Name Relationship Lgl Grd Work Phone Home Phone Mobile Phone   1. BERTRAND RAMOS* Spouse No  [phone]   2. ANT JEYSON * Relative No  [phone]   3. [place]* Mother No  [phone]           Primary language:  English     needed? No   [place] [facility] [employer]:  [phone] op. 1  Other communication barriers: Glasses, Physical impairment  Preferred Method of Communication:  Fritzt  Current living arrangement: I live in a private home with spouse  Mobility Status/ Medical Equipment: Independent    Health Maintenance  Health Maintenance Reviewed: Due/Overdue   Health Maintenance Due   Topic Date Due     MEDICARE ANNUAL WELLNESS VISIT  06/21/2017     PHQ-2  01/01/2020        My Access Plan  Medical Emergency 911   Primary Clinic Line [facility] - [phone]   24 Hour Appointment Line [phone] or  [phone]) (toll-free)   24 Hour Nurse Line [phone] (toll-free)   Preferred Urgent Care Other    Preferred Hospital [facility]  [phone]   Preferred Pharmacy [facility] [place] [address] [facility]     Behavioral Health Crisis Line The National Suicide Prevention Lifeline at 1-849.681.4817 or 591             My Care Team Members  Patient Care Team       Relationship Specialty Notifications Start End    Chuy Isaac MD PCP - General Internal Medicine  10/30/18     Phone: [phone] Fax: [phone]         [address] [facility] [address]    Francis Vyas MD Referring Physician Surgery  4/9/15     GI     Phone: [phone] Fax: [phone]         [address] [facility] [address] [facility] [address]    Bill Brumfield MD MD Gastroenterology  6/2/15     Phone: [phone] Fax: [phone]         [address] [facility] [address]    Patti Milligan MD MD Pain Clinic  6/2/15     Phone: [phone] Fax: [phone]         [address] [place] [address] [facility] [address]    Behl, Melissa K, RN Lead Care Coordinator Primary Care - CC Admissions 3/28/18     Phone: [phone] Fax: [phone]        Chuy Isaac MD Assigned PCP   11/11/18     Phone: [phone] Fax: [phone]         [address] [facility] [address]    Marlyn Jenkins MD MD Ophthalmology  1/29/19     Phone: [phone] Fax: [phone]         [address] [facility] [address] [facility] [address]    Marlyn Jenkins MD MD Ophthalmology  2/11/19     Phone: [phone] Fax: [phone]         [address] [facility] [address] [facility] [address]            My Care Plans  Self Management and Treatment Plan  Goals and (Comments)  Goals        General    #1 Medical (pt-stated)     Notes - Note edited  8/4/2020  3:59 PM by Behl, Melissa K, RN    Goal Statement: I will schedule and attend an appointment with cardiology.  Date Goal set: 10/7/19  Barriers: COVID-19 pandemic, multiple specialists  Strengths: support from  spouse  Date to Achieve By: 10/1/20  Patient expressed understanding of goal: yes  Action steps to achieve this goal:  1. I will schedule an appointment with cardiology  2. I will attend appointment with cardiology           #2 Medical (pt-stated)     Notes - Note edited  8/4/2020  4:00 PM by Behl, Melissa K, RN    Goal Statement: I will schedule and attend an appointment with bariatric surgery, Dr. Vyas.  Date Goal set: 10/7/19  Barriers: COVID-19 pandemic, multiple specialists  Strengths: support from spouse, care coordination  Date to Achieve By: 9/3/20  Patient expressed understanding of goal: yes  Action steps to achieve this goal:  1. I will schedule an appointment with Dr. Vyas (completed)  2. I will attend appointment with Dr. Vyas  3. I will send a picture of my G-tube bile content to Dr. Vyas via [employer]            #3 Medical (pt-stated)     Notes - Note edited  8/4/2020  3:59 PM by Behl, Melissa K, RN    Goal Statement: I will schedule a routine exam with Dr. Isaac  Date Goal set: 7/10/2020   Barriers: COVID-19 pandemic  Strengths: support from spouse, care coordination  Date to Achieve By: 12/1/20  Patient expressed understanding of goal: yes  Action steps to achieve this goal:  1. I will schedule an appointment with Dr. Isaac (completed)  2. I will attend appointment with Dr. Isaac               Action Plans on File:                       Advance Care Plans/Directives Type:   Type Advanced Care Plans/Directives: Advanced Directive - On File    My Medical and Care Information  Problem List   Patient Active Problem List   Diagnosis     Peptic ulcer disease     Gastric bypass status for obesity     Chronic abdominal pain     Vomiting     Anemia     ADHD (attention deficit hyperactivity disorder), inattentive type     Vitamin B12 deficiency without anemia     Thiamine deficiency     Dehydration     Dysphagia     Weight loss, non-intentional     Malnutrition (H)     Chronic anxiety      Constipation     Bile reflux esophagitis     Former smoker     Vitamin D deficiency     Iron deficiency     Health Care Home     Chronic pain     Insomnia     Positive blood culture     Fungemia     Chronic nausea     Gastrostomy tube in place (H)     Cardiomyopathy in nutritional diseases (H)     Short gut syndrome     Anxiety     Status post cervical spinal arthrodesis     Port or reservoir infection, initial encounter     Abnormal echocardiogram     Low serum iron     Anemia, iron deficiency     Catheter-related bloodstream infection (CRBSI)     Generalized weakness     S/P bariatric surgery     Hyperlipidemia LDL goal <130     Bacteremia     Infection by Candida species     PICC line infiltration, sequela     Gram-positive bacteremia     On total parenteral nutrition      Current Medications and Allergies:  See printed Medication Report.    Care Coordination Start Date: 1/10/2019   Frequency of Care Coordination: monthly   Form Last Updated: 08/04/2020

## 2020-08-04 NOTE — PROGRESS NOTES
Clinic Care Coordination Contact    Clinic Care Coordination Contact  OUTREACH    Referral Information:  Referral Source: IP Report    Primary Diagnosis: SIRS/Sepsis    Chief Complaint   Patient presents with     Clinic Care Coordination - Post Hospital     RN        Universal Utilization: Patient is followed by Infectious Disease, Pain Management, Cardiology and Bariatric Surgery  Clinic Utilization  Difficulty keeping appointments:: Yes  Compliance Concerns: Yes  No-Show Concerns: No  No PCP office visit in Past Year: No  Utilization    Last refreshed: 8/4/2020 12:20 PM:  Hospital Admissions 5           Last refreshed: 8/4/2020 12:20 PM:  ED Visits 4           Last refreshed: 8/4/2020 12:20 PM:  No Show Count (past year) 5              Current as of: 8/4/2020 12:20 PM              Clinical Concerns:  Current Medical Concerns:  Patient was inpatient at [facility] 7/28/20-8/3/20 for Bacteremia.  Patient's port was pulled and he had a azevedo placed 8/3/20.  Patient will be on Vanco for 7 days.  RN CC spoke with patient's spouse Rose (consent to communicate on file).  Patient in the background.  Patient has been afebrile since discharge.  Pain is 5/10.  Patient has been feeling well.  Patient needs to schedule hospital 7 day PCP follow up.  He has a future PCP routine exam 9/14/20 and bariatric surgery appointment 9/3/20.  Spouse states patient's cardiology provider never call when their telephone visit scheduled.  She will call to determine what happened and to reschedule.  Gemma [place] was out to see patient today to resume home care and infusion services.  Spouse states the providers recommended that if the patient sweats or is outside and is sweating to come back in and change the central line bandage.  Spouse states she also is aware now that if he has fevers she knows that she is not supposed to run TPN through it.    Patient Active Problem List   Diagnosis     Peptic ulcer disease     Gastric bypass status for  obesity     Chronic abdominal pain     Vomiting     Anemia     ADHD (attention deficit hyperactivity disorder), inattentive type     Vitamin B12 deficiency without anemia     Thiamine deficiency     Dehydration     Dysphagia     Weight loss, non-intentional     Malnutrition (H)     Chronic anxiety     Constipation     Bile reflux esophagitis     Former smoker     Vitamin D deficiency     Iron deficiency     Health Care Home     Chronic pain     Insomnia     Positive blood culture     Fungemia     Chronic nausea     Gastrostomy tube in place (H)     Cardiomyopathy in nutritional diseases (H)     Short gut syndrome     Anxiety     Status post cervical spinal arthrodesis     Port or reservoir infection, initial encounter     Abnormal echocardiogram     Low serum iron     Anemia, iron deficiency     Catheter-related bloodstream infection (CRBSI)     Generalized weakness     S/P bariatric surgery     Hyperlipidemia LDL goal <130     Bacteremia     Infection by Candida species     PICC line infiltration, sequela     Gram-positive bacteremia     On total parenteral nutrition      Current Behavioral Concerns: No concerns at this time.      Education Provided to patient: RN CC reviewed hospital discharge instructions and plan of care.     Pain  Pain (GOAL):: Yes  Type: Acute (<3mo)  Location of chronic pain:: chronic abdominal pain and lower back pain, generalized  Radiating: No  Progression: Waxing and Waning  Description of pain: Aching  Chronic pain severity::   Limitation of routine activities due to chronic pain:: Yes  Description: Unable to perform most daily activities (chores, hobbies, social activities, driving)  Alleviating Factors: Pain Medication, Rest, Heat, Ice  Aggravating Factors: Eating, Positioning, Activity  Health Maintenance Reviewed: Due/Overdue   Health Maintenance Due   Topic Date Due     MEDICARE ANNUAL WELLNESS VISIT  06/21/2017     PHQ-2  01/01/2020      Clinical Pathway: None    Medication  Management:  Medication reconciliation status: Medications reviewed and reconciled.  Continue medications without change.   Patient's spouse assist with medication management.     Functional Status:  Dependent ADLs:: Ambulation-cane, Bathing  Dependent IADLs:: Medication Management, Transportation, Shopping  Bed or wheelchair confined:: No  Mobility Status: Independent  Fallen 2 or more times in the past year?: No  Any fall with injury in the past year?: No    Living Situation:  Current living arrangement:: I live in a private home with spouse  Type of residence:: Private home - stairs    Lifestyle & Psychosocial Needs:  Lifestyle     Physical activity     Days per week: 3 days     Minutes per session: 10 min     Stress: Not on file     Social Needs     Financial resource strain: Not hard at all     Food insecurity     Worry: Never true     Inability: Never true     Transportation needs     Medical: No     Non-medical: No     Diet:: Regular  Inadequate nutrition (GOAL):: No  Tube Feeding: No  Inadequate activity/exercise (GOAL):: No  Significant changes in sleep pattern (GOAL): No  Transportation means:: Regular car, Friend  Financial/Insurance concerns (GOAL):: No  Restoration or spiritual beliefs that impact treatment:: No  Mental health DX:: Yes  Mental health DX how managed:: Medication  Mental health management concern (GOAL):: No  Informal Support system:: Family, Spouse   Socioeconomic History     Marital status:      Spouse name: Rose     Number of children: 1     Years of education: Not on file     Highest education level: Not on file     Tobacco Use     Smoking status: Current Some Day Smoker     Packs/day: 0.25     Years: 3.00     Pack years: 0.75     Types: Cigarettes     Last attempt to quit: 2018     Years since quittin.0     Smokeless tobacco: Never Used     Tobacco comment: I use an e cig every now and than   Substance and Sexual Activity     Alcohol use: No     Comment: quit       Drug use: No     Sexual activity: Yes     Partners: Female     Birth control/protection: None     Comment: no protection        Patient's spouse informed writer patient's insurance will not cover transportation services.     Resources and Interventions:  Current Resources:   List of home care services:: Skilled Nursing  Community Resources: Home Infusion, Home Care  Supplies used at home:: None  Equipment Currently Used at Home: cane, straight, shower chair    Advance Care Plan/Directive  Advanced Care Plans/Directives on file:: Yes  Type Advanced Care Plans/Directives: Advanced Directive - On File  Advanced Care Plan/Directive Status: Not Applicable    Referrals Placed: None     Goals:   Goals        General    #1 Medical (pt-stated)     Notes - Note edited  8/4/2020  3:59 PM by Behl, Melissa K, RN    Goal Statement: I will schedule and attend an appointment with cardiology.  Date Goal set: 10/7/19  Barriers: COVID-19 pandemic, multiple specialists  Strengths: support from spouse  Date to Achieve By: 10/1/20  Patient expressed understanding of goal: yes  Action steps to achieve this goal:  1. I will schedule an appointment with cardiology  2. I will attend appointment with cardiology           #2 Medical (pt-stated)     Notes - Note edited  8/4/2020  4:00 PM by Behl, Melissa K, RN    Goal Statement: I will schedule and attend an appointment with bariatric surgery, Dr. Vyas.  Date Goal set: 10/7/19  Barriers: COVID-19 pandemic, multiple specialists  Strengths: support from spouse, care coordination  Date to Achieve By: 9/3/20  Patient expressed understanding of goal: yes  Action steps to achieve this goal:  1. I will schedule an appointment with Dr. Vyas (completed)  2. I will attend appointment with Dr. Vyas  3. I will send a picture of my G-tube bile content to Dr. Vyas via [employer]            #3 Medical (pt-stated)     Notes - Note edited  8/4/2020  3:59 PM by Behl, Melissa K, RN    Goal Statement: I will  schedule a routine exam with Dr. Isaac  Date Goal set: 7/10/2020   Barriers: COVID-19 pandemic  Strengths: support from spouse, care coordination  Date to Achieve By: 12/1/20  Patient expressed understanding of goal: yes  Action steps to achieve this goal:  1. I will schedule an appointment with Dr. Isaac (completed)  2. I will attend appointment with Dr. Isaac              Patient/Caregiver understanding: Spouse agreeable to plan of care.    Outreach Frequency: monthly  Future Appointments              In 1 month [facility]    In 1 month Chuy Isaac MD [facility]    In 1 month Patti Milligan MD [facility]          Plan:   1. Patient will attend future appointments with PCP 9/14/20 and Dr. Vyas 9/3/20.  2. Patient will schedule a 7 day post-hospital follow up appointment.  3. Patient/spouse will reschedule cardiology appointment.  4. RN CC will send updated complex care plan.  5. RN CC will follow up with patient/spouse in 1 month.    Melissa Behl BSN, RN, PHN, CCM  Primary Care Clinical RN Care Coordinator  [facility]   [phone]

## 2020-08-04 NOTE — TELEPHONE ENCOUNTER
Called and spoke to Parker and he is requesting an increase in his oxycodone regimen until his appointment on 9/16/20. He would like an additional 10 mL/day.     He was hospitalized from 7/28/2020 - 8/3/2020 (6 days) at the [facility] [place].    He reports his regimen was increased while inpatient due to infections, pain, and trouble with his IVs and port.    He currently has 140 mL left of his medication. He obtained this information by looking at his bottle so will expect it to be accurate. With the 6 day hospitalization would expect that he have approx 220 mL. See breakdown below.    7/11/20 start with                     600 mL  18 days of use prior to admit   360 mL                                          =       240 mL remaining   discharged yesterday at 1636  for an additional 20 mL use.for approx 220 mL remaining.     Routing request for increased dose to Dr Milligan to review.    Jyothi Winchester, YADIRAN, RN  Care Coordinator  [facility] [place]

## 2020-08-04 NOTE — TELEPHONE ENCOUNTER
Patient called to schedule an appointment for a hospital follow-up or appeared on a report showing that they were recently discharged from the hospital.    Patient was admitted to :  [facility]  Discharged date: 8/03/20  Reason for hospital admission:  Staphylococcus Epidermidis Bacteremia, Bacteremia  Does patient have future appointment scheduled with provider? Yes   Date of future appointment:        This information will be used to help the care team plan for the patients upcoming visit.  The triage RN may determine that a follow up call is necessary and reach out to the patient via the phone number listed in the chart.     Please route this message on routine priority to the Triage RN pool.

## 2020-08-04 NOTE — TELEPHONE ENCOUNTER
Woman calling with pt present requesting that Dr. Milligan call them back regarding pts medication.    Estelle RAMOS    [facility]

## 2020-08-04 NOTE — TELEPHONE ENCOUNTER
I am willing to use an extra 10mg/day ONLY during this infection.  Otherwise we need to go back down to his lower doses.  His doses were increased for acute issues, but I am not willing to do that long-term due to the issues we have had with these medications.    I think he is getting about 4 days of vancomycin, so he can use the extra 10mg/day x 4 days, then he needs to decrease to the baseline 20mg/day.    No more than 10mg at a time.    This means he will be out 2 days early- but we also need to factor in his hospital time.    Jessica Milligan MD  [facility]

## 2020-08-05 ENCOUNTER — TELEPHONE (OUTPATIENT)
Dept: PALLIATIVE MEDICINE | Facility: CLINIC | Age: 50
End: 2020-08-05

## 2020-08-05 LAB
BACTERIA SPEC CULT: ABNORMAL
SPECIMEN SOURCE: ABNORMAL

## 2020-08-05 NOTE — TELEPHONE ENCOUNTER
"Spoke with Rose to review process for administering medication for Parker at home in light of him being short again.     She reports he has not given up that responsibility fully to her and was taking extra medication prior to going into the hospital due to having extra pain.    Writer could hear patient in the background saying \"I think I did pretty damn good\" .    With increase documented below patient will need a refill 6 days early.     Please route back to nursing after reviewing. Nursing still to review plan with patient for using an extra 10mg/day ONLY during this infection and to process next months refill if appropriate.     Jyothi Winchester, YADIRAN, RN  Care Coordinator  [facility]      "

## 2020-08-05 NOTE — TELEPHONE ENCOUNTER
This is being managed in a separate encounter.    YADIRA LazarN, RN  Care Coordinator  [facility] [place]

## 2020-08-05 NOTE — TELEPHONE ENCOUNTER
Pt requesting to increase his oxyCODONE (ROXICODONE) 5 MG/5ML solution due to being in the hospital.      Wade MIGUEL    [facility] [place]

## 2020-08-05 NOTE — TELEPHONE ENCOUNTER
"ED/Discharge Protocol    \"Hi, my name is Ella Hammond RN, a registered nurse, and I am calling on behalf of Dr. Isaac's office at [place].  I am calling to follow up and see how things are going for you after your recent visit.\"    \"I see that you were in the (ER/UC/IP) on 7/28/2020.    How are you doing now that you are home?\" better than yesterday    Is patient experiencing symptoms that may require a hospital visit?  No    Discharge Instructions    \"Let's review your discharge instructions.  What is/are the follow-up recommendations?  Pt. Response: patient states he is taking medications as directed    \"Were you instructed to make a follow-up appointment?\"  Pt. Response: Yes.  Has appointment been made?   Yes      \"When you see the provider, I would recommend that you bring your discharge instructions with you.    Medications    \"How many new medications are you on since your hospitalization/ED visit?\"    0-1  \"How many of your current medicines changed (dose, timing, name, etc.) while you were in the hospital/ED visit?\"   0-1  \"Do you have questions about your medications?\"   No  \"Were you newly diagnosed with heart failure, COPD, diabetes or did you have a heart attack?\"   No  For patients on insulin: \"Did you start on insulin in the hospital or did you have your insulin dose changed?\"   No  Post Discharge Medication Reconciliation Status: discharge medications reconciled, continue medications without change.    Was MTM referral placed (*Make sure to put transitions as reason for referral)?   No    Call Summary    \"Do you have any questions or concerns about your condition or care plan at the moment?\"    Patient states he has a request in with Jael Zimmerman MD for continued, increase on pain medication.  Patient states he is waiting to hear if that request is going to be approved.     Triage nurse advice given: please call with any questions or concerns    Patient was in ER 2 times in the past year (assess " "appropriateness of ER visits.)      \"If you have questions or things don't continue to improve, we encourage you contact us through the main clinic number,  [phone].  Even if the clinic is not open, triage nurses are available 24/7 to help you.     We would like you to know that our clinic has extended hours (provide information).  We also have urgent care (provide details on closest location and hours/contact info)\"      \"Thank you for your time and take care!\"    Ella Hammond RN        "

## 2020-08-06 ENCOUNTER — TELEPHONE (OUTPATIENT)
Dept: ENDOCRINOLOGY | Facility: CLINIC | Age: 50
End: 2020-08-06

## 2020-08-06 ENCOUNTER — MYC MEDICAL ADVICE (OUTPATIENT)
Dept: INTERNAL MEDICINE | Facility: CLINIC | Age: 50
End: 2020-08-06

## 2020-08-06 ENCOUNTER — TELEPHONE (OUTPATIENT)
Dept: INTERNAL MEDICINE | Facility: CLINIC | Age: 50
End: 2020-08-06

## 2020-08-06 DIAGNOSIS — Z93.4 GASTROJEJUNOSTOMY TUBE STATUS (H): ICD-10-CM

## 2020-08-06 DIAGNOSIS — Z98.84 GASTRIC BYPASS STATUS FOR OBESITY: Primary | ICD-10-CM

## 2020-08-06 NOTE — TELEPHONE ENCOUNTER
Spoke to Rose with Parker present and and reviewed that he can use an additional 10 mL daily until he is done being treated with the Vanco. They both agreed to that plan and will notify Dr Milligan when he finished the antibiotic.     There is a refill at the pharmacy already so there are no current refill needs.     Posted a med note with the 175 mL remaining today and that an extra 10mg/day is approved for the remainder of the vanco treatment.    Jyothi Winchester, BSN, RN  Care Coordinator  [facility] [place]

## 2020-08-06 NOTE — TELEPHONE ENCOUNTER
I did call the patient and his wife yesterday.  I do feel there is some inconsistency in regards to what he has remaining, and his reported use.    Per patient, he denies using more, but when his remaining amount was discussed vs what should be there, he isn't able to explain that-  While previously with RN, it did seem that there was overuse.    I would like nursing to call one more time to make sure they understand plan, and determine refill needs.    Again, I am allowing an extra 10mg/day during the remainder of the vanco treatment.    Jessica Milligan MD  [facility]

## 2020-08-06 NOTE — TELEPHONE ENCOUNTER
Spoke with patient's wife Rose.  States they tried to reinsert th g-tube last night after it fell out.    G-Tube: 18 fr, 3.0 cm Zoran-Y button.    Setup appointment for patient to come in tomorrow to see Dr. Morrow to have the tube replaced.

## 2020-08-06 NOTE — TELEPHONE ENCOUNTER
"S-(situation): Patient's spouse is requesting a prescription for chux pads.    B-(background): Patient has been using chux pads for his G-tube drainage when venting and central line protection from the floor for \"years\" per spouse.    A-(assessment): RN CLARITZA received a call from patient's spouse Rose (consent to communicate on file) requesting a prescription of chux pads to be faxed to [facility]: phone [phone], FAX: [phone].  Rose states they were getting the chux pads from [facility], however, they found out today that [facility] is no longer able to provide them with these supplies.    Rose states chux pads come in packs of 10 and 1 box is 10 packs.  She is inquiring if they can have 1 box per month.    R-(recommendations/plan): Please advise.    Melissa Behl BSN, RN, PHN, CCM  Primary Care Clinical RN Care Coordinator  [facility]   [phone]       "

## 2020-08-06 NOTE — PROGRESS NOTES
This is a recent snapshot of the patient's [facility] medical record.  For current drug dose and complete information and questions, call [phone] or In Basket pool, [facility] home infusion ([id number])  CSN Number:  [id number]

## 2020-08-11 ENCOUNTER — HOSPITAL ENCOUNTER (OUTPATIENT)
Dept: LAB | Facility: CLINIC | Age: 50
Discharge: HOME | End: 2020-08-11
Attending: SURGERY | Admitting: SURGERY
Payer: COMMERCIAL

## 2020-08-11 LAB
ALBUMIN SERPL-MCNC: 3.5 G/DL (ref 3.4–5)
ALP SERPL-CCNC: 67 U/L (ref 40–150)
ALT SERPL W P-5'-P-CCNC: 17 U/L (ref 0–70)
ANION GAP SERPL CALCULATED.3IONS-SCNC: 6 MMOL/L (ref 3–14)
AST SERPL W P-5'-P-CCNC: 15 U/L (ref 0–45)
BASOPHILS # BLD AUTO: 0.1 10E9/L (ref 0–0.2)
BASOPHILS NFR BLD AUTO: 1 %
BILIRUB DIRECT SERPL-MCNC: 0.1 MG/DL (ref 0–0.2)
BILIRUB SERPL-MCNC: 0.3 MG/DL (ref 0.2–1.3)
BUN SERPL-MCNC: 8 MG/DL (ref 7–30)
CALCIUM SERPL-MCNC: 8.6 MG/DL (ref 8.5–10.1)
CHLORIDE SERPL-SCNC: 110 MMOL/L (ref 94–109)
CO2 SERPL-SCNC: 29 MMOL/L (ref 20–32)
CREAT SERPL-MCNC: 0.89 MG/DL (ref 0.66–1.25)
DIFFERENTIAL METHOD BLD: ABNORMAL
EOSINOPHIL NFR BLD AUTO: 6.5 %
ERYTHROCYTE [DISTWIDTH] IN BLOOD BY AUTOMATED COUNT: 15.4 % (ref 10–15)
GFR SERPL CREATININE-BSD FRML MDRD: >90 ML/MIN/{1.73_M2}
GLUCOSE SERPL-MCNC: 84 MG/DL (ref 70–99)
HCT VFR BLD AUTO: 32 % (ref 40–53)
HGB BLD-MCNC: 10.3 G/DL (ref 13.3–17.7)
IMM GRANULOCYTES # BLD: 0 10E9/L (ref 0–0.4)
IMM GRANULOCYTES NFR BLD: 0.2 %
LYMPHOCYTES # BLD AUTO: 1.6 10E9/L (ref 0.8–5.3)
LYMPHOCYTES NFR BLD AUTO: 31.3 %
MAGNESIUM SERPL-MCNC: 2.1 MG/DL (ref 1.6–2.3)
MCH RBC QN AUTO: 29.1 PG (ref 26.5–33)
MCHC RBC AUTO-ENTMCNC: 32.2 G/DL (ref 31.5–36.5)
MCV RBC AUTO: 90 FL (ref 78–100)
MONOCYTES # BLD AUTO: 0.8 10E9/L (ref 0–1.3)
MONOCYTES NFR BLD AUTO: 15.4 %
NEUTROPHILS # BLD AUTO: 2.3 10E9/L (ref 1.6–8.3)
NEUTROPHILS NFR BLD AUTO: 45.6 %
NRBC # BLD AUTO: 0 10*3/UL
NRBC BLD AUTO-RTO: 0 /100
PHOSPHATE SERPL-MCNC: 4 MG/DL (ref 2.5–4.5)
PLATELET # BLD AUTO: 148 10E9/L (ref 150–450)
POTASSIUM SERPL-SCNC: 3.1 MMOL/L (ref 3.4–5.3)
PROT SERPL-MCNC: 6.5 G/DL (ref 6.8–8.8)
RBC # BLD AUTO: 3.54 10E12/L (ref 4.4–5.9)
SODIUM SERPL-SCNC: 145 MMOL/L (ref 133–144)
TRIGL SERPL-MCNC: 57 MG/DL
WBC # BLD AUTO: 5 10E9/L (ref 4–11)

## 2020-08-11 PROCEDURE — 83735 ASSAY OF MAGNESIUM: CPT | Performed by: SURGERY

## 2020-08-11 PROCEDURE — 85025 COMPLETE CBC W/AUTO DIFF WBC: CPT | Performed by: SURGERY

## 2020-08-11 PROCEDURE — 84478 ASSAY OF TRIGLYCERIDES: CPT | Mod: GZ | Performed by: SURGERY

## 2020-08-11 PROCEDURE — 80053 COMPREHEN METABOLIC PANEL: CPT | Performed by: SURGERY

## 2020-08-11 PROCEDURE — 82248 BILIRUBIN DIRECT: CPT | Performed by: SURGERY

## 2020-08-11 PROCEDURE — 84100 ASSAY OF PHOSPHORUS: CPT | Performed by: SURGERY

## 2020-08-12 ENCOUNTER — HOME INFUSION (PRE-WILLOW HOME INFUSION) (OUTPATIENT)
Dept: PHARMACY | Facility: CLINIC | Age: 50
End: 2020-08-12

## 2020-08-13 NOTE — PROGRESS NOTES
This is a recent snapshot of the patient's [facility] medical record.  For current drug dose and complete information and questions, call [phone] or In Basket pool, [facility] home infusion ([id number])  CSN Number:  [id number]

## 2020-08-14 NOTE — TELEPHONE ENCOUNTER
RN CLARITZA received a call from patient's spouse Rose inquiring if the chux prescription was sent in.  CHRISTY JERRY informed Rose it has been faxed on 8/12/20.  Rose will contact [facility] and update care team if there are any concerns.    Melissa Behl BSN, RN, PHN, [facility]  Primary Care Clinical RN Care Coordinator  [facility]   [phone]

## 2020-08-17 ENCOUNTER — HOSPITAL ENCOUNTER (OUTPATIENT)
Dept: LAB | Facility: CLINIC | Age: 50
Discharge: HOME | End: 2020-08-17
Attending: SURGERY | Admitting: SURGERY
Payer: COMMERCIAL

## 2020-08-17 ENCOUNTER — MEDICAL CORRESPONDENCE (OUTPATIENT)
Dept: HEALTH INFORMATION MANAGEMENT | Facility: CLINIC | Age: 50
End: 2020-08-17

## 2020-08-17 LAB
ALBUMIN SERPL-MCNC: 3.1 G/DL (ref 3.4–5)
ALP SERPL-CCNC: 65 U/L (ref 40–150)
ALT SERPL W P-5'-P-CCNC: 16 U/L (ref 0–70)
ANION GAP SERPL CALCULATED.3IONS-SCNC: 6 MMOL/L (ref 3–14)
AST SERPL W P-5'-P-CCNC: 12 U/L (ref 0–45)
BASOPHILS # BLD AUTO: 0 10E9/L (ref 0–0.2)
BASOPHILS NFR BLD AUTO: 0.5 %
BILIRUB DIRECT SERPL-MCNC: 0.1 MG/DL (ref 0–0.2)
BILIRUB SERPL-MCNC: 0.2 MG/DL (ref 0.2–1.3)
BUN SERPL-MCNC: 12 MG/DL (ref 7–30)
CALCIUM SERPL-MCNC: 8.4 MG/DL (ref 8.5–10.1)
CHLORIDE SERPL-SCNC: 108 MMOL/L (ref 94–109)
CO2 SERPL-SCNC: 28 MMOL/L (ref 20–32)
CREAT SERPL-MCNC: 0.74 MG/DL (ref 0.66–1.25)
DIFFERENTIAL METHOD BLD: ABNORMAL
EOSINOPHIL NFR BLD AUTO: 3.8 %
ERYTHROCYTE [DISTWIDTH] IN BLOOD BY AUTOMATED COUNT: 15.7 % (ref 10–15)
GFR SERPL CREATININE-BSD FRML MDRD: >90 ML/MIN/{1.73_M2}
GLUCOSE SERPL-MCNC: 83 MG/DL (ref 70–99)
HCT VFR BLD AUTO: 34.1 % (ref 40–53)
HGB BLD-MCNC: 10.9 G/DL (ref 13.3–17.7)
IMM GRANULOCYTES # BLD: 0 10E9/L (ref 0–0.4)
IMM GRANULOCYTES NFR BLD: 0.2 %
LYMPHOCYTES # BLD AUTO: 1.6 10E9/L (ref 0.8–5.3)
LYMPHOCYTES NFR BLD AUTO: 27.1 %
MAGNESIUM SERPL-MCNC: 1.9 MG/DL (ref 1.6–2.3)
MCH RBC QN AUTO: 29 PG (ref 26.5–33)
MCHC RBC AUTO-ENTMCNC: 32 G/DL (ref 31.5–36.5)
MCV RBC AUTO: 91 FL (ref 78–100)
MONOCYTES # BLD AUTO: 0.9 10E9/L (ref 0–1.3)
MONOCYTES NFR BLD AUTO: 14 %
NEUTROPHILS # BLD AUTO: 3.3 10E9/L (ref 1.6–8.3)
NEUTROPHILS NFR BLD AUTO: 54.4 %
NRBC # BLD AUTO: 0 10*3/UL
NRBC BLD AUTO-RTO: 0 /100
PHOSPHATE SERPL-MCNC: 3.7 MG/DL (ref 2.5–4.5)
PLATELET # BLD AUTO: 157 10E9/L (ref 150–450)
POTASSIUM SERPL-SCNC: 3.6 MMOL/L (ref 3.4–5.3)
PROT SERPL-MCNC: 6.4 G/DL (ref 6.8–8.8)
RBC # BLD AUTO: 3.76 10E12/L (ref 4.4–5.9)
SODIUM SERPL-SCNC: 142 MMOL/L (ref 133–144)
TRIGL SERPL-MCNC: 77 MG/DL
WBC # BLD AUTO: 6.1 10E9/L (ref 4–11)

## 2020-08-17 PROCEDURE — 84478 ASSAY OF TRIGLYCERIDES: CPT | Mod: GZ | Performed by: SURGERY

## 2020-08-17 PROCEDURE — 85025 COMPLETE CBC W/AUTO DIFF WBC: CPT | Performed by: SURGERY

## 2020-08-17 PROCEDURE — 82248 BILIRUBIN DIRECT: CPT | Performed by: SURGERY

## 2020-08-17 PROCEDURE — 84100 ASSAY OF PHOSPHORUS: CPT | Performed by: SURGERY

## 2020-08-17 PROCEDURE — 83735 ASSAY OF MAGNESIUM: CPT | Performed by: SURGERY

## 2020-08-17 PROCEDURE — 80053 COMPREHEN METABOLIC PANEL: CPT | Performed by: SURGERY

## 2020-08-19 ENCOUNTER — HOME INFUSION (PRE-WILLOW HOME INFUSION) (OUTPATIENT)
Dept: PHARMACY | Facility: CLINIC | Age: 50
End: 2020-08-19

## 2020-08-20 NOTE — PROGRESS NOTES
This is a recent snapshot of the patient's [facility] medical record.  For current drug dose and complete information and questions, call [phone] or In Basket pool, [facility]v home infusion ([id number])  CSN Number:  [id number]

## 2020-08-21 DIAGNOSIS — Z53.9 DIAGNOSIS NOT YET DEFINED: Primary | ICD-10-CM

## 2020-08-21 PROCEDURE — 99207 C MD RECERTIFICATION HHA PT: CPT | Performed by: INTERNAL MEDICINE

## 2020-08-21 PROCEDURE — G0179 MD RECERTIFICATION HHA PT: HCPCS | Performed by: INTERNAL MEDICINE

## 2020-08-27 ENCOUNTER — HOME INFUSION (PRE-WILLOW HOME INFUSION) (OUTPATIENT)
Dept: PHARMACY | Facility: CLINIC | Age: 50
End: 2020-08-27

## 2020-08-27 ENCOUNTER — OFFICE VISIT (OUTPATIENT)
Dept: SURGERY | Facility: CLINIC | Age: 50
End: 2020-08-27
Payer: COMMERCIAL

## 2020-08-27 ENCOUNTER — TELEPHONE (OUTPATIENT)
Dept: SURGERY | Facility: CLINIC | Age: 50
End: 2020-08-27

## 2020-08-27 VITALS
WEIGHT: 204.9 LBS | HEIGHT: 71 IN | TEMPERATURE: 98.7 F | DIASTOLIC BLOOD PRESSURE: 95 MMHG | HEART RATE: 72 BPM | OXYGEN SATURATION: 99 % | SYSTOLIC BLOOD PRESSURE: 136 MMHG | BODY MASS INDEX: 28.69 KG/M2

## 2020-08-27 DIAGNOSIS — E44.0 MODERATE PROTEIN-CALORIE MALNUTRITION (H): Primary | ICD-10-CM

## 2020-08-27 ASSESSMENT — ENCOUNTER SYMPTOMS
HEMATURIA: 0
EYE IRRITATION: 0
JAUNDICE: 0
FLANK PAIN: 0
JOINT SWELLING: 0
DIFFICULTY URINATING: 1
DIARRHEA: 1
NECK PAIN: 1
BLOOD IN STOOL: 0
STIFFNESS: 1
HALLUCINATIONS: 0
SLEEP DISTURBANCES DUE TO BREATHING: 0
BOWEL INCONTINENCE: 0
DYSURIA: 1
MUSCLE CRAMPS: 1
ARTHRALGIAS: 1
INCREASED ENERGY: 0
ALTERED TEMPERATURE REGULATION: 1
LIGHT-HEADEDNESS: 0
SYNCOPE: 0
EXERCISE INTOLERANCE: 1
MUSCLE WEAKNESS: 0
EYE REDNESS: 0
RECTAL PAIN: 0

## 2020-08-27 ASSESSMENT — MIFFLIN-ST. JEOR: SCORE: 1826.55

## 2020-08-27 ASSESSMENT — PAIN SCALES - GENERAL: PAINLEVEL: MILD PAIN (2)

## 2020-08-27 NOTE — NURSING NOTE
"Chief Complaint   Patient presents with     Follow Up     Pt here for G-tube replacement.       Vitals:    08/27/20 0956   BP: (!) 136/95   BP Location: Left arm   Patient Position: Sitting   Cuff Size: Adult Large   Pulse: 72   Temp: 98.7  F (37.1  C)   TempSrc: Oral   SpO2: 99%   Weight: 92.9 kg (204 lb 14.4 oz)   Height: 1.803 m (5' 11\")       Body mass index is 28.58 kg/m .      Carol Middleton CMA    "

## 2020-08-27 NOTE — LETTER
2020     RE: Parker Acevedo  [address]  [facility] [address]     Dear Colleague,    Thank you for referring your patient, Parker Acevedo, to the [facility] GENERAL SURGERY at [facility]. Please see a copy of my visit note below.  Return Bariatric Surgery Note    RE: Parker Acevedo  MR#: [id number]  : 1970  VISIT DATE: Aug 27, 2020    Dear Dr. Isaac,    I had the pleasure of seeing your patient, Parker Acevedo, in my post-bariatric surgery assessment clinic.    CHIEF COMPLAINT: Post-bariatric surgery follow-up    Parker has chronic bowel dysmotility which causes intermittent bloating and potential for blow-out of the gastric remnant.    We have previously proven that there is no anatomic obstruction of any portion of the foregut or small bowel anatomy.    Requires TPN and has used g-tube in the remnant for pressure venting of the lower stomach.    G-tube fell out 2 weeks previously and he was unable to come into clinic immediately.  That would have been very helpful since the g-tube tract was likely open at that time.    Now the g-tube tract has closed down.  Occasional spits fluid, but no longer patent.    HISTORY OF PRESENT ILLNESS:  Questions Regarding Prior Weight Loss Surgery Reviewed With Patient 2019   I had the following weight loss procedure: Celestino-en-y Gastric Bypass   What year was your surgery? 2003   How has your weight changed since your last visit? I have lost weight   Are you currently taking any weight loss medications? No   Do you currently have any of the following: Heartburn, acid reflux, or GERD (acid reflux disease)?   Have you been to the Emergency room since your last visit with us? Yes   Were you in the hospital since your last visit with us? Yes   Do you have any concerns today? Yes       Weight History:     2019   What is your highest lifetime weight? 489   What is your lowest weight since surgery? (In  pounds) 141        Weight: 92.9 kg (204 lb 14.4 oz)             Questions Regarding Co-Morbidities and Health Concerns Reviewed With Patient 1/28/2019   Pre-diabetes: Never   Diabetes II: Never   High Blood Pressure: Never   High cholesterol: Stayed the same   Heartburn/Reflux: Worsened   Are you taking daily medication for heartburn, acid reflux, or GERD (acid reflux disease)? Yes   Sleep apnea: Stayed the same   PCOS: Never   Back pain: Worsened   Joint pain: Worsened   Lower leg swelling: Stayed the same       Eating Habits 1/28/2019   How many meals do you eat per day? 1   Do you snack between meals? No   How much food are you eating at each meal? Less than 1/2 cup   Are you able to separate your meals and liquids by at least 30 minutes? No   Are you able to avoid liquid calories? No       Exercise Questions Reviewed With Patient 1/28/2019   How often do you exercise? Less than 1 time per week   What is the duration of your exercise (in minutes)? I do not exercise.   What types of exercise do you do? walking   What keeps you from being more active?  Pain, I have recently been sick, My ability to walk or move around is limited, Too tired       Social History:      1/28/2019   Are you smoking? No   Are you drinking alcohol? No       Medications:  Current Outpatient Medications   Medication     acetaminophen (TYLENOL) 32 mg/mL liquid     albuterol (PROAIR HFA/PROVENTIL HFA/VENTOLIN HFA) 108 (90 Base) MCG/ACT inhaler     alteplase 2 mg/2 mL (in 10 mL syringe)     carvedilol (COREG) 6.25 MG tablet     cyanocobalamin (CYANOCOBALAMIN) 1000 MCG/ML injection     diphenhydrAMINE (BENADRYL) 25 MG capsule     [facility]     naloxone (NARCAN) 4 MG/0.1ML nasal spray     ondansetron (ZOFRAN-ODT) 8 MG ODT tab     parenteral nutrition - PTA/DISCHARGE ORDER     sucralfate (CARAFATE) 1 GM/10ML suspension     vitamin D2 (ERGOCALCIFEROL) 97050 units (1250 mcg) capsule     amphetamine-dextroamphetamine  "(ADDERALL) 20 MG tablet     ceFAZolin (ANCEF) 2 G/20ML injection     fentaNYL (DURAGESIC) 25 mcg/hr 72 hr patch     lidocaine, alum & mag hydroxide-simethicone GI Cocktail     LORazepam (ATIVAN) 1 MG tablet     oxyCODONE (ROXICODONE) 5 MG/5ML solution     pantoprazole (PROTONIX) 40 MG EC tablet     No current facility-administered medications for this visit.          1/28/2019   Do you avoid NSAIDs such as (Ibuprofen, Aleve, Naproxen, Advil)?   Yes       ROS:  GI:      1/28/2019   Vomiting: Yes   Diarrhea: Yes   Constipation: No   Swallowing trouble: Yes   Abdominal pain: Yes   Heartburn: Yes   Rash in skin folds: No   Depression: No   Stress urinary incontinence Yes     Skin:   BAR RBS ROS - SKIN 1/28/2019   Rash in skin folds: No     Psych:      1/28/2019   Depression: No   Anxiety: Yes     Female Only: No flowsheet data found.    LABS/IMAGING/MEDICAL RECORDS REVIEW: reviewed.    PHYSICAL EXAMINATION:  BP (!) 136/95 (BP Location: Left arm, Patient Position: Sitting, Cuff Size: Adult Large)   Pulse 72   Temp 98.7  F (37.1  C) (Oral)   Ht 1.803 m (5' 11\")   Wt 92.9 kg (204 lb 14.4 oz)   SpO2 99%   BMI 28.58 kg/m     NAD  g-tube incision has nearly closed.  There is no way to replace g-tube; I had a g-tube available for this.    ASSESSMENT AND PLAN:      1. Many years status laparoscopic gastric bypass and at least 3 revision procedures due to medical complication (ulcer); no hunger and inability to tolerate oral intake has left Parker dependent on TPN and g-tube for venting purposes.  I will plan to coordinate replacement of g-tube with Dr. Bach.    Will do EUS-guided gastric remnant access with g-tube placement.    2. Morbid Obesity historical current BMI: Body mass index is 28.58 kg/m .  3. Post surgical malabsorption:   Labs ordered per protocol.   Follow food plan per dietitian recommendations.   Continue taking recommended post-op vitamins.  4. Return to clinic prn.    Sincerely,    Francis Vyas, " MD    I spent a total of 20 minutes face to face with Parker during today's office visit. Over 50% of this time was spent counseling the patient and/or coordinating care.    Answers for HPI/ROS submitted by the patient on 8/27/2020   General Symptoms: Yes  Skin Symptoms: No  HENT Symptoms: No  EYE SYMPTOMS: Yes  HEART SYMPTOMS: Yes  LUNG SYMPTOMS: No  INTESTINAL SYMPTOMS: Yes  URINARY SYMPTOMS: Yes  REPRODUCTIVE SYMPTOMS: No  SKELETAL SYMPTOMS: Yes  BLOOD SYMPTOMS: No  NERVOUS SYSTEM SYMPTOMS: No  MENTAL HEALTH SYMPTOMS: No  Feeling hot or cold when others believe the temperature is normal: Yes  Loss of height: No  Post-operative complications: Yes  Surgical site pain: Yes  Hallucinations: No  Change in or Loss of Energy: No  Hyperactivity: No  Confusion: No  Flashing of lights: No  Spots: No  Floaters: No  Redness: No  Crossed eyes: No  Tunnel Vision: No  Yellowing of eyes: No  Eye irritation: No  Fainting: No  Murmurs: No  Pacemaker: No  Varicose veins: Yes  Edema or swelling: Yes  Wake up at night with shortness of breath: No  Light-headedness: No  Exercise intolerance: Yes  Diarrhea: Yes  Blood in stool: No  Black stools: No  Rectal or Anal pain: No  Fecal incontinence: No  Yellowing of skin or eyes: No  Vomit with blood: No  Change in stools: No  Pain or burning: Yes  Trouble starting or stopping: Yes  Increased frequency of urination: No  Blood in urine: No  Decreased frequency of urination: Yes  Frequent nighttime urination: No  Flank pain: No  Difficulty emptying bladder: Yes  Neck pain: Yes  Swollen joints: No  Joint pain: Yes  Bone pain: Yes  Muscle cramps: Yes  Muscle weakness: No  Joint stiffness: Yes  Bone fracture: No    Again, thank you for allowing me to participate in the care of your patient.      Sincerely,    Francis Vyas MD

## 2020-08-28 ENCOUNTER — HOME INFUSION (PRE-WILLOW HOME INFUSION) (OUTPATIENT)
Dept: PHARMACY | Facility: CLINIC | Age: 50
End: 2020-08-28

## 2020-08-28 PROCEDURE — 87040 BLOOD CULTURE FOR BACTERIA: CPT | Performed by: SURGERY

## 2020-08-28 NOTE — TELEPHONE ENCOUNTER
Received call from patient's wife, Rose, @7:30PM to report patient has had fevers the last 2 days (100.6 and 101.3).     Wife is requesting [facility] contact MD for order for home health RN to collect blood cultures.      Jyothi Peraza, PharmD [facility]   Ph: [phone]  Fax: [phone]

## 2020-08-29 PROCEDURE — 87040 BLOOD CULTURE FOR BACTERIA: CPT | Performed by: SURGERY

## 2020-08-29 PROCEDURE — 87181 SC STD AGAR DILUTION PER AGT: CPT | Performed by: SURGERY

## 2020-08-29 PROCEDURE — 87800 DETECT AGNT MULT DNA DIREC: CPT | Performed by: SURGERY

## 2020-08-29 PROCEDURE — 87077 CULTURE AEROBIC IDENTIFY: CPT | Performed by: SURGERY

## 2020-08-30 ENCOUNTER — MYC REFILL (OUTPATIENT)
Dept: CARE COORDINATION | Facility: CLINIC | Age: 50
End: 2020-08-30

## 2020-08-30 ENCOUNTER — TELEPHONE (OUTPATIENT)
Dept: SURGERY | Facility: CLINIC | Age: 50
End: 2020-08-30

## 2020-08-30 DIAGNOSIS — R11.0 NAUSEA: ICD-10-CM

## 2020-08-30 DIAGNOSIS — F90.0 ADHD (ATTENTION DEFICIT HYPERACTIVITY DISORDER), INATTENTIVE TYPE: ICD-10-CM

## 2020-08-30 DIAGNOSIS — Z98.84 GASTRIC BYPASS STATUS FOR OBESITY: ICD-10-CM

## 2020-08-30 DIAGNOSIS — Z98.84 S/P BARIATRIC SURGERY: ICD-10-CM

## 2020-08-30 RX ORDER — ONDANSETRON 8 MG/1
TABLET, ORALLY DISINTEGRATING ORAL
Qty: 90 TABLET | Refills: 1 | Status: CANCELLED | OUTPATIENT
Start: 2020-08-30

## 2020-08-31 ENCOUNTER — HOSPITAL ENCOUNTER (INPATIENT)
Facility: CLINIC | Age: 50
LOS: 4 days | Discharge: HOME | DRG: 315 | End: 2020-09-04
Attending: EMERGENCY MEDICINE | Admitting: STUDENT IN AN ORGANIZED HEALTH CARE EDUCATION/TRAINING PROGRAM
Payer: COMMERCIAL

## 2020-08-31 ENCOUNTER — APPOINTMENT (OUTPATIENT)
Dept: GENERAL RADIOLOGY | Facility: CLINIC | Age: 50
DRG: 315 | End: 2020-08-31
Attending: EMERGENCY MEDICINE
Payer: COMMERCIAL

## 2020-08-31 ENCOUNTER — ANCILLARY PROCEDURE (OUTPATIENT)
Dept: ULTRASOUND IMAGING | Facility: CLINIC | Age: 50
End: 2020-08-31
Attending: EMERGENCY MEDICINE
Payer: COMMERCIAL

## 2020-08-31 DIAGNOSIS — R78.81 GRAM-POSITIVE BACTEREMIA: ICD-10-CM

## 2020-08-31 DIAGNOSIS — R78.81 BACTEREMIA: Primary | ICD-10-CM

## 2020-08-31 DIAGNOSIS — Z20.828 EXPOSURE TO SARS-ASSOCIATED CORONAVIRUS: ICD-10-CM

## 2020-08-31 DIAGNOSIS — Z98.84 S/P BARIATRIC SURGERY: ICD-10-CM

## 2020-08-31 LAB
ALBUMIN SERPL-MCNC: 3.2 G/DL (ref 3.4–5)
ALP SERPL-CCNC: 69 U/L (ref 40–150)
ALT SERPL W P-5'-P-CCNC: 25 U/L (ref 0–70)
ANION GAP SERPL CALCULATED.3IONS-SCNC: 3 MMOL/L (ref 3–14)
AST SERPL W P-5'-P-CCNC: 12 U/L (ref 0–45)
BASOPHILS # BLD AUTO: 0.1 10E9/L (ref 0–0.2)
BASOPHILS NFR BLD AUTO: 0.8 %
BILIRUB SERPL-MCNC: 0.3 MG/DL (ref 0.2–1.3)
BUN SERPL-MCNC: 13 MG/DL (ref 7–30)
CALCIUM SERPL-MCNC: 8.1 MG/DL (ref 8.5–10.1)
CHLORIDE SERPL-SCNC: 112 MMOL/L (ref 94–109)
CO2 SERPL-SCNC: 26 MMOL/L (ref 20–32)
CREAT SERPL-MCNC: 0.63 MG/DL (ref 0.66–1.25)
DIFFERENTIAL METHOD BLD: ABNORMAL
EOSINOPHIL # BLD AUTO: 0.1 10E9/L (ref 0–0.7)
EOSINOPHIL NFR BLD AUTO: 1.5 %
ERYTHROCYTE [DISTWIDTH] IN BLOOD BY AUTOMATED COUNT: 16.9 % (ref 10–15)
GFR SERPL CREATININE-BSD FRML MDRD: >90 ML/MIN/{1.73_M2}
GLUCOSE SERPL-MCNC: 82 MG/DL (ref 70–99)
HCT VFR BLD AUTO: 38.3 % (ref 40–53)
HGB BLD-MCNC: 11.9 G/DL (ref 13.3–17.7)
IMM GRANULOCYTES # BLD: 0 10E9/L (ref 0–0.4)
IMM GRANULOCYTES NFR BLD: 0.2 %
LACTATE BLD-SCNC: 1.4 MMOL/L (ref 0.7–2)
LYMPHOCYTES # BLD AUTO: 2.1 10E9/L (ref 0.8–5.3)
LYMPHOCYTES NFR BLD AUTO: 22.7 %
MCH RBC QN AUTO: 28.1 PG (ref 26.5–33)
MCHC RBC AUTO-ENTMCNC: 31.1 G/DL (ref 31.5–36.5)
MCV RBC AUTO: 91 FL (ref 78–100)
MONOCYTES # BLD AUTO: 0.8 10E9/L (ref 0–1.3)
MONOCYTES NFR BLD AUTO: 8.8 %
NEUTROPHILS # BLD AUTO: 6.1 10E9/L (ref 1.6–8.3)
NEUTROPHILS NFR BLD AUTO: 66 %
NRBC # BLD AUTO: 0 10*3/UL
NRBC BLD AUTO-RTO: 0 /100
PLATELET # BLD AUTO: 220 10E9/L (ref 150–450)
POTASSIUM SERPL-SCNC: 4 MMOL/L (ref 3.4–5.3)
PROT SERPL-MCNC: 6.5 G/DL (ref 6.8–8.8)
RBC # BLD AUTO: 4.23 10E12/L (ref 4.4–5.9)
SODIUM SERPL-SCNC: 141 MMOL/L (ref 133–144)
WBC # BLD AUTO: 9.2 10E9/L (ref 4–11)

## 2020-08-31 PROCEDURE — 87040 BLOOD CULTURE FOR BACTERIA: CPT | Performed by: EMERGENCY MEDICINE

## 2020-08-31 PROCEDURE — 99285 EMERGENCY DEPT VISIT HI MDM: CPT | Mod: 25 | Performed by: EMERGENCY MEDICINE

## 2020-08-31 PROCEDURE — 87077 CULTURE AEROBIC IDENTIFY: CPT | Performed by: EMERGENCY MEDICINE

## 2020-08-31 PROCEDURE — 83605 ASSAY OF LACTIC ACID: CPT | Performed by: EMERGENCY MEDICINE

## 2020-08-31 PROCEDURE — 96375 TX/PRO/DX INJ NEW DRUG ADDON: CPT | Performed by: EMERGENCY MEDICINE

## 2020-08-31 PROCEDURE — C9803 HOPD COVID-19 SPEC COLLECT: HCPCS | Performed by: EMERGENCY MEDICINE

## 2020-08-31 PROCEDURE — U0003 INFECTIOUS AGENT DETECTION BY NUCLEIC ACID (DNA OR RNA); SEVERE ACUTE RESPIRATORY SYNDROME CORONAVIRUS 2 (SARS-COV-2) (CORONAVIRUS DISEASE [COVID-19]), AMPLIFIED PROBE TECHNIQUE, MAKING USE OF HIGH THROUGHPUT TECHNOLOGIES AS DESCRIBED BY CMS-2020-01-R: HCPCS | Performed by: EMERGENCY MEDICINE

## 2020-08-31 PROCEDURE — 85025 COMPLETE CBC W/AUTO DIFF WBC: CPT | Performed by: EMERGENCY MEDICINE

## 2020-08-31 PROCEDURE — 93308 TTE F-UP OR LMTD: CPT | Mod: 26 | Performed by: EMERGENCY MEDICINE

## 2020-08-31 PROCEDURE — 96365 THER/PROPH/DIAG IV INF INIT: CPT | Performed by: EMERGENCY MEDICINE

## 2020-08-31 PROCEDURE — 87186 SC STD MICRODIL/AGAR DIL: CPT | Performed by: EMERGENCY MEDICINE

## 2020-08-31 PROCEDURE — 93308 TTE F-UP OR LMTD: CPT | Performed by: EMERGENCY MEDICINE

## 2020-08-31 PROCEDURE — 25000128 H RX IP 250 OP 636: Performed by: STUDENT IN AN ORGANIZED HEALTH CARE EDUCATION/TRAINING PROGRAM

## 2020-08-31 PROCEDURE — 96366 THER/PROPH/DIAG IV INF ADDON: CPT | Performed by: EMERGENCY MEDICINE

## 2020-08-31 PROCEDURE — 25000125 ZZHC RX 250: Performed by: EMERGENCY MEDICINE

## 2020-08-31 PROCEDURE — 71045 X-RAY EXAM CHEST 1 VIEW: CPT

## 2020-08-31 PROCEDURE — 25000128 H RX IP 250 OP 636: Performed by: EMERGENCY MEDICINE

## 2020-08-31 PROCEDURE — 25000132 ZZH RX MED GY IP 250 OP 250 PS 637: Performed by: STUDENT IN AN ORGANIZED HEALTH CARE EDUCATION/TRAINING PROGRAM

## 2020-08-31 PROCEDURE — 80053 COMPREHEN METABOLIC PANEL: CPT | Performed by: EMERGENCY MEDICINE

## 2020-08-31 PROCEDURE — 25000132 ZZH RX MED GY IP 250 OP 250 PS 637: Performed by: EMERGENCY MEDICINE

## 2020-08-31 PROCEDURE — 25800030 ZZH RX IP 258 OP 636: Performed by: EMERGENCY MEDICINE

## 2020-08-31 PROCEDURE — 12000001 ZZH R&B MED SURG/OB UMMC

## 2020-08-31 RX ORDER — ALBUTEROL SULFATE 90 UG/1
2 AEROSOL, METERED RESPIRATORY (INHALATION) EVERY 4 HOURS PRN
Status: DISCONTINUED | OUTPATIENT
Start: 2020-08-31 | End: 2020-09-04 | Stop reason: HOSPADM

## 2020-08-31 RX ORDER — DIPHENHYDRAMINE HCL 25 MG
25 CAPSULE ORAL EVERY 8 HOURS
Status: DISCONTINUED | OUTPATIENT
Start: 2020-09-01 | End: 2020-09-04 | Stop reason: HOSPADM

## 2020-08-31 RX ORDER — NALOXONE HYDROCHLORIDE 0.4 MG/ML
.1-.4 INJECTION, SOLUTION INTRAMUSCULAR; INTRAVENOUS; SUBCUTANEOUS
Status: DISCONTINUED | OUTPATIENT
Start: 2020-08-31 | End: 2020-09-04 | Stop reason: HOSPADM

## 2020-08-31 RX ORDER — OXYCODONE HCL 5 MG/5 ML
5-10 SOLUTION, ORAL ORAL 2 TIMES DAILY PRN
Status: DISCONTINUED | OUTPATIENT
Start: 2020-08-31 | End: 2020-09-01

## 2020-08-31 RX ORDER — DEXTROAMPHETAMINE SACCHARATE, AMPHETAMINE ASPARTATE, DEXTROAMPHETAMINE SULFATE AND AMPHETAMINE SULFATE 5; 5; 5; 5 MG/1; MG/1; MG/1; MG/1
TABLET ORAL
Qty: 30 TABLET | Refills: 0 | Status: SHIPPED | OUTPATIENT
Start: 2020-08-31 | End: 2020-09-24

## 2020-08-31 RX ORDER — PANTOPRAZOLE SODIUM 40 MG/1
40 TABLET, DELAYED RELEASE ORAL DAILY
Qty: 30 TABLET | Refills: 5 | Status: SHIPPED | OUTPATIENT
Start: 2020-08-31 | End: 2021-08-09

## 2020-08-31 RX ORDER — ONDANSETRON 4 MG/1
8 TABLET, ORALLY DISINTEGRATING ORAL EVERY 8 HOURS PRN
Status: DISCONTINUED | OUTPATIENT
Start: 2020-08-31 | End: 2020-09-04 | Stop reason: HOSPADM

## 2020-08-31 RX ORDER — LORAZEPAM 1 MG/1
TABLET ORAL
Qty: 30 TABLET | Refills: 0 | Status: SHIPPED | OUTPATIENT
Start: 2020-08-31 | End: 2020-09-24

## 2020-08-31 RX ORDER — DEXTROAMPHETAMINE SACCHARATE, AMPHETAMINE ASPARTATE, DEXTROAMPHETAMINE SULFATE AND AMPHETAMINE SULFATE 2.5; 2.5; 2.5; 2.5 MG/1; MG/1; MG/1; MG/1
20 TABLET ORAL DAILY
Status: DISCONTINUED | OUTPATIENT
Start: 2020-09-01 | End: 2020-09-04 | Stop reason: HOSPADM

## 2020-08-31 RX ORDER — ERGOCALCIFEROL 1.25 MG/1
50000 CAPSULE, LIQUID FILLED ORAL WEEKLY
Status: DISCONTINUED | OUTPATIENT
Start: 2020-09-06 | End: 2020-09-04 | Stop reason: HOSPADM

## 2020-08-31 RX ORDER — LORAZEPAM 1 MG/1
1 TABLET ORAL DAILY PRN
Status: DISCONTINUED | OUTPATIENT
Start: 2020-08-31 | End: 2020-09-04 | Stop reason: HOSPADM

## 2020-08-31 RX ORDER — DIPHENHYDRAMINE HYDROCHLORIDE 50 MG/ML
25 INJECTION INTRAMUSCULAR; INTRAVENOUS ONCE
Status: COMPLETED | OUTPATIENT
Start: 2020-08-31 | End: 2020-08-31

## 2020-08-31 RX ORDER — LIDOCAINE 40 MG/G
CREAM TOPICAL
Status: DISCONTINUED | OUTPATIENT
Start: 2020-08-31 | End: 2020-09-04 | Stop reason: HOSPADM

## 2020-08-31 RX ORDER — SUCRALFATE ORAL 1 G/10ML
1 SUSPENSION ORAL 4 TIMES DAILY PRN
Status: DISCONTINUED | OUTPATIENT
Start: 2020-08-31 | End: 2020-09-04 | Stop reason: HOSPADM

## 2020-08-31 RX ORDER — CYANOCOBALAMIN 1000 UG/ML
1000 INJECTION, SOLUTION INTRAMUSCULAR; SUBCUTANEOUS
Status: DISCONTINUED | OUTPATIENT
Start: 2020-09-07 | End: 2020-09-04 | Stop reason: HOSPADM

## 2020-08-31 RX ORDER — CARVEDILOL 6.25 MG/1
6.25 TABLET ORAL 2 TIMES DAILY WITH MEALS
Status: DISCONTINUED | OUTPATIENT
Start: 2020-08-31 | End: 2020-09-04 | Stop reason: HOSPADM

## 2020-08-31 RX ORDER — FENTANYL 25 UG/1
25 PATCH TRANSDERMAL
Status: DISCONTINUED | OUTPATIENT
Start: 2020-08-30 | End: 2020-09-04 | Stop reason: HOSPADM

## 2020-08-31 RX ORDER — PANTOPRAZOLE SODIUM 40 MG/1
40 TABLET, DELAYED RELEASE ORAL DAILY
Status: DISCONTINUED | OUTPATIENT
Start: 2020-09-01 | End: 2020-09-04 | Stop reason: HOSPADM

## 2020-08-31 RX ADMIN — DIPHENHYDRAMINE HYDROCHLORIDE 25 MG: 50 INJECTION, SOLUTION INTRAMUSCULAR; INTRAVENOUS at 19:58

## 2020-08-31 RX ADMIN — VANCOMYCIN HYDROCHLORIDE 2250 MG: 10 INJECTION, POWDER, LYOPHILIZED, FOR SOLUTION INTRAVENOUS at 20:01

## 2020-08-31 RX ADMIN — CARVEDILOL 6.25 MG: 6.25 TABLET, FILM COATED ORAL at 23:17

## 2020-08-31 RX ADMIN — ONDANSETRON 8 MG: 4 TABLET, ORALLY DISINTEGRATING ORAL at 22:41

## 2020-08-31 RX ADMIN — OXYCODONE HYDROCHLORIDE 10 MG: 5 SOLUTION ORAL at 22:37

## 2020-08-31 RX ADMIN — LORAZEPAM 1 MG: 1 TABLET ORAL at 22:45

## 2020-08-31 RX ADMIN — LIDOCAINE HYDROCHLORIDE 30 ML: 20 SOLUTION ORAL; TOPICAL at 19:09

## 2020-08-31 ASSESSMENT — MIFFLIN-ST. JEOR: SCORE: 1768.04

## 2020-08-31 NOTE — TELEPHONE ENCOUNTER
lidocaine, alum & mag hydroxide-simethicone GI Cocktail 1 Bottle 1    Si ml daily as needed for mouth/stomach pain.      There is no refill protocol information for this order   Last Written Prescription Date:  2020  Last Fill Quantity: 1 bottle ,  # refills: 1   Last office visit: 3/26/2020  Future Office Visit:   Gastric bypass status for obesity [Z98.84]   Next 5 appointments (look out 90 days)    Sep 16, 2020  2:45 PM CDT  Return Visit with Patti Milligan MD  [facility] ([facility]) [address] [facility] [id number] [phone]             LORazepam (ATIVAN) 1 MG tablet 30 tablet 0    Sig: TAKE 1 TABLET (1MG) BY MOUTH AS NEEDED FOR ANXIETY WITH TPN AND MEDICATIONS . JUST ONCE A DAY (30 TO LAST 30 DAYS)      There is no refill protocol information for this order   Last Written Prescription Date:  2020  Last Fill Quantity: 30,  # refills: 0   Last office visit: 3/26/2020  ADHD (attention deficit hyperactivity disorder), inattentive type [F90.0]         amphetamine-dextroamphetamine (ADDERALL) 20 MG tablet 30 tablet 0    Sig: TAKE ONE TABLET BY MOUTH ONCE DAILY      There is no refill protocol information for this order   Last Written Prescription Date:  2020  Last Fill Quantity: 30,  # refills: 0   Last office visit: 3/26/2020      Routing refill request to provider for review/approval because:  Drug not on the [facility] refill protocol     Ella Hammond RN

## 2020-08-31 NOTE — TELEPHONE ENCOUNTER
PATIENT PHONE CALL DOCUMENTATION     I was contacted by ID lab with a newly positive BCx growing gram positive rods drawn on 8/29.  I d/w chief resident Dr. Mora who advised me to contact the patient and advise him to present to the ED.      Pt has been febrile at home for the past few days, per wife.      I recommended presenting to the closest ED for evaluation and consideration of line exchange.      Pt's wife states they feel most comfortable coming to the U.     Sara Warren MD  Surgery Resident PGY-2  Pg [phone]

## 2020-08-31 NOTE — ED PROVIDER NOTES
ED Provider Note  [facility]      History     Chief Complaint   Patient presents with     Abnormal Labs     Fever     HPI  Parker Acevedo is a 49 year old male who presents to [facility] emergency department after contacted due to positive blood culture obtained 8/28/2020, positive for gram-positive rods by peripheral draw (negative x2 central line ports) in the context of prior corynebacterium infection in July 2020 for which he received a course of vancomycin.  He relates generalized malaise, intermittent fever/chills/sweats, diffuse arthralgias, diffuse headache.  Recent hospitalization from 7/28 through 8/3/2020 reviewed in electronic medical records.  Discharge diagnoses included MRSE bacteremia secondary to central venous catheter infection, recurrent polymicrobial bacteremia, history of Celestino-en-Y gastric bypass, esophagojejunostomy with development of short gut syndrome and chronic malnutrition.    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     Complication of anesthesia      Difficulty swallowing      Gastric ulcer, unspecified as acute or chronic, without mention of hemorrhage, perforation, or obstruction      Gastro-oesophageal reflux disease      Head injury      Hiatal hernia      Other bladder disorder      Other chronic pain      PONV (postoperative nausea and vomiting)      Severe malnutrition (H)     TPN     Short gut syndrome      Tobacco abuse      Past Surgical History:   Procedure Laterality Date     AMPUTATION       APPENDECTOMY       BACK SURGERY  11/3/2014    curve in the spine     BIOPSY LYMPH NODE CERVICAL N/A 2/20/2015    Procedure: BIOPSY LYMPH NODE CERVICAL;  Surgeon:  Baron Scanlon MD;  Location: [facility] OR     C GASTRIC BYPASS,OBESE<100CM SHAYLEE-EN-Y  2002    lost 300 pounds     CHOLECYSTECTOMY       DISCECTOMY, FUSION CERVICAL ANTERIOR ONE LEVEL, COMBINED N/A 2/15/2017    Procedure: COMBINED DISCECTOMY, FUSION CERVICAL ANTERIOR ONE LEVEL;  Surgeon: Darren Campos MD;  Location: [facility]     ENDOSCOPIC INSERTION TUBE GASTROSTOMY  9/9/2013    Procedure: ENDOSCOPIC INSERTION TUBE GASTROSTOMY;;  Surgeon: Francis Vyas MD;  [facility]     ENDOSCOPIC ULTRASOUND UPPER GASTROINTESTINAL TRACT (GI)  4/29/2011    Procedure:ENDOSCOPIC ULTRASOUND UPPER GASTROINTESTINAL TRACT (GI); Both Procedures done Conjointly; Surgeon:NEREIDA HOUSER; [facility]     ENDOSCOPIC ULTRASOUND UPPER GASTROINTESTINAL TRACT (GI)  9/9/2013    Procedure: ENDOSCOPIC ULTRASOUND UPPER GASTROINTESTINAL TRACT (GI);  Endoscopic Ultrasound Guide Gastrostomy Tube Placement  C-arm;  Surgeon: Noe Lizarraga MD;  [facility]     ENDOSCOPY  03/25/11    EGD, [facility]     ENDOSCOPY  08/04/09    Upper Endoscopy, [facility]     ENDOSCOPY  01/05/09    Upper Endoscopy, [facility]     ESOPHAGOSCOPY, GASTROSCOPY, DUODENOSCOPY (EGD), COMBINED  4/20/2011    Procedure:COMBINED ESOPHAGOSCOPY, GASTROSCOPY, DUODENOSCOPY (EGD); Surgeon:FRANCIS VYAS; [facility]     ESOPHAGOSCOPY, GASTROSCOPY, DUODENOSCOPY (EGD), COMBINED  6/15/2011    Procedure:COMBINED ESOPHAGOSCOPY, GASTROSCOPY, DUODENOSCOPY (EGD); Surgeon:FRANCIS VYAS; [facility]     ESOPHAGOSCOPY, GASTROSCOPY, DUODENOSCOPY (EGD), COMBINED  6/12/2013    Procedure: COMBINED ESOPHAGOSCOPY, GASTROSCOPY, DUODENOSCOPY (EGD);;  Surgeon: Francis Vyas MD;  [facility]     ESOPHAGOSCOPY, GASTROSCOPY, DUODENOSCOPY (EGD), COMBINED  11/22/2013    Procedure: COMBINED ESOPHAGOSCOPY, GASTROSCOPY, DUODENOSCOPY (EGD);;  Surgeon: Francis Vyas MD;  [facility]     ESOPHAGOSCOPY, GASTROSCOPY, DUODENOSCOPY  (EGD), COMBINED  4/30/2014    Procedure: COMBINED ESOPHAGOSCOPY, GASTROSCOPY, DUODENOSCOPY (EGD);  Surgeon: Francis Vyas MD;  Location:  GI     ESOPHAGOSCOPY, GASTROSCOPY, DUODENOSCOPY (EGD), COMBINED N/A 2/20/2015    Procedure: COMBINED ESOPHAGOSCOPY, GASTROSCOPY, DUODENOSCOPY (EGD), BIOPSY SINGLE OR MULTIPLE;  Surgeon: Baron Scanlon MD;  Location: [facility]     ESOPHAGOSCOPY, GASTROSCOPY, DUODENOSCOPY (EGD), COMBINED N/A 9/30/2015    Procedure: COMBINED ESOPHAGOSCOPY, GASTROSCOPY, DUODENOSCOPY (EGD);  Surgeon: Francis Vyas MD;  Location:  GI     ESOPHAGOSCOPY, GASTROSCOPY, DUODENOSCOPY (EGD), COMBINED N/A 10/3/2019    Procedure: Upper Endoscopy;  Surgeon: Clif Morrow MD;  [facility]     GASTRECTOMY  6/22/2012    Procedure: GASTRECTOMY;  Open Approach, Excise Ulcers,Partial Gastrectomy, Esophagojejunostomy, Hiatal Hernia Repair, Extensive Lysis of Adhesions and Esaphagogastrodudenoscopy.;  Surgeon: Francis Vyas MD;  [facility]     GASTROJEJUNOSTOMY  08/26/09    Extensice enterolysis, partial resect. jejunum, part. resect gastric pouch, gastrojejunostomy anastomosis     HC ESOPH/GAS REFLUX TEST W NASAL IMPED ELECTRODE  8/5/2013    Procedure: ESOPHAGEAL IMPEDENCE FUNCTION TEST 1 HOUR OR LESS;  Surgeon: Halie Lang MD;  Location:  GI     HEAD & NECK SURGERY  2/15/2017    C5-C6     HERNIA REPAIR  2006    Umbilical hernia     HERNIORRHAPHY HIATAL  6/22/2012    Procedure: HERNIORRHAPHY HIATAL;;  Surgeon: Francis Vyas MD;  [facility]     HERNIORRHAPHY INGUINAL  11/22/2013    Procedure: HERNIORRHAPHY INGUINAL;;  Surgeon: Francis Vyas MD;  [facility]     INSERT PICC LINE Right 12/19/2019    Procedure: Picc Placement;  Surgeon: Per Dumont PA-C;  [facility]     INSERT PICC LINE Right 2/21/2020    Procedure: INSERTION, PICC;  Surgeon: Per Dumont PA-C;  [facility]     INSERT PORT VASCULAR ACCESS Right  12/19/2017    Procedure: INSERT PORT VASCULAR ACCESS;  Right Chest Port Placement ;  Surgeon: Lisandro Alejandro PA-C;  [facility]     INSERT PORT VASCULAR ACCESS Right 8/2/2018    Procedure: INSERT PORT VASCULAR ACCESS;  Place single lumen tunneled central venous access catheter;  Surgeon: Guy Jamil PA-C;  [facility]     IR CVC TUNNEL PLACEMENT > 5 YRS OF AGE  8/7/2019     IR CVC TUNNEL PLACEMENT > 5 YRS OF AGE  4/14/2020     IR CVC TUNNEL PLACEMENT > 5 YRS OF AGE  8/3/2020     IR CVC TUNNEL REMOVAL RIGHT  10/1/2019     IR CVC TUNNEL REMOVAL RIGHT  7/30/2020     IR FOLLOW UP VISIT OUTPATIENT  8/7/2019     IR PICC PLACEMENT > 5 YRS OF AGE  3/7/2019     IR PICC PLACEMENT > 5 YRS OF AGE  12/19/2019     IR PICC PLACEMENT > 5 YRS OF AGE  2/21/2020     LAPAROTOMY EXPLORATORY  11/22/2013    Procedure: LAPAROTOMY EXPLORATORY;  Exploratory Laparotomy, Upper Endoscopy, Left Inguinal Hernia Repair;  Surgeon: Francis Vyas MD;  [facility]     ORTHOPEDIC SURGERY       PICC INSERTION Right 03/16/2017    5fr DL BioFlo PICC, 42cm (3cm external) in the R medial brachial vein w/ tip in the SVC RA junction.     PICC INSERTION Left 09/23/2017    5fr DL BioFlo PICC, 45cm (1cm external) in the L basilic vein w/ tip in the SVC RA junction.     PICC INSERTION Right 05/16/2019    5Fr - 43cm, Medial brachial vein, low SVC     PICC INSERTION Right 10/02/2019    5Fr - 43cm (2cm external), basilic vein, low SVC     SHAYLEE EN Y BOWEL  2003     SOFT TISSUE SURGERY       THORACIC SURGERY       TONSILLECTOMY       TRANSESOPHAGEAL ECHOCARDIOGRAM INTRAOPERATIVE N/A 1/8/2019    Procedure: TRANSESOPHAGEAL ECHOCARDIOGRAM INTRAOPERATIVE;  Surgeon: GENERIC ANESTHESIA PROVIDER;  Location[facility]     acetaminophen (TYLENOL) 32 mg/mL liquid  amphetamine-dextroamphetamine (ADDERALL) 20 MG tablet  carvedilol (COREG) 6.25 MG tablet  cyanocobalamin (CYANOCOBALAMIN) 1000 MCG/ML injection  diphenhydrAMINE (BENADRYL) 25 MG  capsule  [facility]  fentaNYL (DURAGESIC) 25 mcg/hr 72 hr patch  lidocaine, alum & mag hydroxide-simethicone GI Cocktail  LORazepam (ATIVAN) 1 MG tablet  ondansetron (ZOFRAN-ODT) 8 MG ODT tab  oxyCODONE (ROXICODONE) 5 MG/5ML solution  pantoprazole (PROTONIX) 40 MG EC tablet  parenteral nutrition - PTA/DISCHARGE ORDER  sucralfate (CARAFATE) 1 GM/10ML suspension  vitamin D2 (ERGOCALCIFEROL) 35979 units (1250 mcg) capsule  albuterol (PROAIR HFA/PROVENTIL HFA/VENTOLIN HFA) 108 (90 Base) MCG/ACT inhaler  alteplase 2 mg/2 mL (in 10 mL syringe)  naloxone (NARCAN) 4 MG/0.1ML nasal spray      Allergies   Allergen Reactions     Bactrim [Sulfamethoxazole W/Trimethoprim] Rash     Penicillins Anaphylaxis     Please see Antimicrobial Management Team allergy assessment note 10/10/2018. Patient reported tolerating amoxicillin.     Doxycycline Rash     Vancomycin Rash     Rash after receiving vancomycin 3/28/16 (red man's?). Tolerated with slower infusion and diphenhydramine premed.     Family History  Family History   Problem Relation Age of Onset     Gastrointestinal Disease Mother         Crohns disease     Anxiety Disorder Mother      Thyroid Disease Mother         Grave's disease     Cancer Father         ear cancer-skin cancer/melanoma     Breast Cancer Maternal Grandmother      Macular Degeneration Maternal Grandfather      Anxiety Disorder Sister      Diabetes Maternal Uncle      Breast Cancer Other      Hypertension No family hx of      Hyperlipidemia No family hx of      Cerebrovascular Disease No family hx of      Prostate Cancer No family hx of      Depression No family hx of      Anesthesia Reaction No family hx of      Asthma No family hx of      Osteoporosis No family hx of      Genetic Disorder No family hx of      Obesity No family hx of      Mental Illness No family hx of      Substance Abuse No family hx of      Glaucoma No family hx of      Social History   Social History     Tobacco  "Use     Smoking status: Current Some Day Smoker     Packs/day: 0.25     Years: 3.00     Pack years: 0.75     Types: Cigarettes     Smokeless tobacco: Never Used   Substance Use Topics     Alcohol use: No     Frequency: Never     Drinks per session: Patient refused     Binge frequency: Never     Comment: quit 2002     Drug use: No      Past medical history, past surgical history, medications, allergies, family history, and social history were reviewed with the patient. No additional pertinent items.       Review of Systems  A complete review of systems was performed with pertinent positives and negatives noted in the HPI, and all other systems negative.    Physical Exam   BP: 137/84  Pulse: 99  Temp: 99.8  F (37.7  C)  Resp: 16  Height: 180.3 cm (5' 11\")  Weight: 87.1 kg (192 lb)  SpO2: 99 %  Physical Exam  Constitutional:       General: He is not in acute distress.     Appearance: He is not toxic-appearing.   HENT:      Head: Normocephalic.      Nose: No rhinorrhea.      Mouth/Throat:      Mouth: Mucous membranes are moist.      Pharynx: Oropharynx is clear.   Eyes:      Conjunctiva/sclera: Conjunctivae normal.   Neck:      Musculoskeletal: Neck supple. No neck rigidity.   Cardiovascular:      Rate and Rhythm: Normal rate and regular rhythm.      Pulses: Normal pulses.   Pulmonary:      Effort: Pulmonary effort is normal. No respiratory distress.      Breath sounds: No stridor.   Abdominal:      General: There is no distension.      Tenderness: There is no guarding or rebound.      Comments: Mild generalized tenderness without evidence for peritonitis.   G tube in place.    Musculoskeletal:         General: No swelling or tenderness.      Right lower leg: No edema.      Left lower leg: No edema.   Skin:     General: Skin is warm and dry.      Capillary Refill: Capillary refill takes less than 2 seconds.      Coloration: Skin is not jaundiced or pale.      Findings: No rash.   Neurological:      General: No focal " deficit present.      Mental Status: He is alert and oriented to person, place, and time.   Psychiatric:         Speech: Speech normal.         Behavior: Behavior is cooperative.         Cognition and Memory: Cognition is not impaired.       ED Course     ED Course as of Aug 31 2200   Mon Aug 31, 2020   1806 Sepsis panel ordered.   Pharmacy to dose vancomycin.   Plan to admit for bacteremia.       1845 Lactic Acid: 1.4   1845 WBC: 9.2   1845 Creatinine(!): 0.63   2104 Admit discussion with accepting internal medicine physician Dr. Ariel Duarte.         The Lactic acid level is 1.4, at this time there is no sign of severe sepsis or septic shock.      No results found for any visits on 08/31/20.  Medications - No data to display     Assessments & Plan (with Medical Decision Making)   This is a 49-year-old male presenting for evaluation of bacteremia, initially growing gram-positive rods with history of corynebacterium infection.  Differential diagnosis includes but is not limited to persistent or recurrent corynebacterium or other gram-positive khalif bacteremia, polymicrobial infection, electrolyte derangement, indwelling line or catheter infection, infective endocarditis although no gross valvular abnormality identified by point-of-care cardiac ultrasound.  History and physical exam are inconsistent with severe sepsis at this time.  Diagnostic evaluation will include sepsis lab panel, plain chest radiography, point-of-care cardiac ultrasound.  After discussion with ED pharmacy staff, will initiate IV vancomycin, acknowledging history of side effects requiring Benadryl and slowed IV rate.  Disposition pending results of diagnostic evaluation and response to therapeutic interventions, tentative disposition will be admission to inpatient medicine service.    I have reviewed the nursing notes. I have reviewed the findings, diagnosis, plan and need for follow up with the patient.    Final Diagnosis:  Bacteremia, growing gram  positive rods from peripheral lab draw.      Disposition:  Admit to inpatient medicine service  --  Guero Mackey MD  [facility], [place], EMERGENCY DEPARTMENT  8/31/2020     Guero Mackey MD  09/01/20 0008

## 2020-08-31 NOTE — TELEPHONE ENCOUNTER
"Requested Prescriptions   Pending Prescriptions Disp Refills     pantoprazole (PROTONIX) 40 MG EC tablet 30 tablet 8     Sig: Take 1 tablet (40 mg) by mouth daily   Last Written Prescription Date:  2020  Last Fill Quantity: 30,  # refills: 8   Last office visit: 3/6/2020  Future Office Visit:   Next 5 appointments (look out 90 days)    Sep 16, 2020  2:45 PM CDT  Return Visit with Patti Milligan MD  [facility] ([facility]) [address] [place] [address]  [phone]           PPI Protocol Passed - 2020  8:32 AM        Passed - Not on Clopidogrel (unless Pantoprazole ordered)        Passed - No diagnosis of osteoporosis on record        Passed - Recent (12 mo) or future (30 days) visit within the authorizing provider's specialty     Patient has had an office visit with the authorizing provider or a provider within the authorizing providers department within the previous 12 mos or has a future within next 30 days. See \"Patient Info\" tab in inbasket, or \"Choose Columns\" in Meds & Orders section of the refill encounter.              Passed - Medication is active on med list        Passed - Patient is age 18 or older      Prescription approved per [facility] Refill Protocol.       lidocaine, alum & mag hydroxide-simethicone GI Cocktail 1 Bottle 1     Si ml daily as needed for mouth/stomach pain.       There is no refill protocol information for this order   Routing refill request to provider for review/approval          LORazepam (ATIVAN) 1 MG tablet 30 tablet 0     Sig: TAKE 1 TABLET (1MG) BY MOUTH AS NEEDED FOR ANXIETY WITH TPN AND MEDICATIONS . JUST ONCE A DAY (30 TO LAST 30 DAYS)       There is no refill protocol information for this order   Routing refill request to provider for review/approval          amphetamine-dextroamphetamine (ADDERALL) 20 MG tablet 30 tablet 0     Sig: TAKE ONE TABLET BY MOUTH ONCE DAILY       There is no refill protocol " information for this order      Routing refill request to provider for review/approval   Ella Hammond RN

## 2020-08-31 NOTE — ED NOTES
Pt states his Arya peg tube fell out at home during middle of night. He did notify physician. Removed from chart

## 2020-08-31 NOTE — PHARMACY-VANCOMYCIN DOSING SERVICE
Pharmacy Vancomycin Initial Note  Date of Service 2020  Patient's  1970  49 year old, male    Indication: Bacteremia    Current estimated CrCl = CrCl cannot be calculated (Patient's most recent lab result is older than the maximum 10 days allowed.).    Creatinine for last 3 days  No results found for requested labs within last 72 hours.    Recent Vancomycin Level(s) for last 3 days  No results found for requested labs within last 72 hours.      Vancomycin IV Administrations (past 72 hours)      No vancomycin orders with administrations in past 72 hours.                Nephrotoxins and other renal medications (From now, onward)    Start     Dose/Rate Route Frequency Ordered Stop    20 0300  vancomycin 1250 mg in 0.9% NaCl 250 mL intermittent infusion 1,250 mg      1,250 mg  over 2.5 Hours Intravenous EVERY 8 HOURS 20 1831      20 1845  vancomycin (VANCOCIN) 2,250 mg in sodium chloride 0.9 % 500 mL intermittent infusion      2,250 mg  over 2.5 Hours Intravenous ONCE 20 1811            Contrast Orders - past 72 hours (72h ago, onward)    None                Plan:  1.  Start vancomycin  2250 mg IV x1 (26 mg/kg), followed by 1250 mg IV q8h (based on recent dosing history).   2.  Goal Trough Level: 15-20 mg/L   3.  Pharmacy will check trough levels as appropriate in 1-3 Days.    4. Serum creatinine levels will be ordered daily for the first week of therapy and at least twice weekly for subsequent weeks.    5. [place] method utilized to dose vancomycin therapy: Method 2

## 2020-08-31 NOTE — PROGRESS NOTES
This is a recent snapshot of the patient's [facility] medical record.  For current drug dose and complete information and questions, call [phone] or In Basket pool, [facility] home infusion ([id number])  CSN Number:  [id number]

## 2020-08-31 NOTE — PROGRESS NOTES
This is a recent snapshot of the patient's [facility] medical record.  For current drug dose and complete information and questions, call [phone] or In Basket pool, [facility] home infusion ([id number])  CSN Number:  [id number]

## 2020-08-31 NOTE — ED TRIAGE NOTES
Pt had positive blood culture from Friday, was told to come to ER. C/o fever, headache, chills, sweats, joint paint, fatigue, nausea which started 3 weeks.   Was recently in hospital 3 wks ago for kassandra tube infection.     Denies cough, SOB

## 2020-09-01 ENCOUNTER — PREP FOR PROCEDURE (OUTPATIENT)
Dept: ENDOCRINOLOGY | Facility: CLINIC | Age: 50
End: 2020-09-01

## 2020-09-01 DIAGNOSIS — K31.1 GASTRIC OUTLET OBSTRUCTION: Primary | ICD-10-CM

## 2020-09-01 LAB
BASOPHILS # BLD AUTO: 0.1 10E9/L (ref 0–0.2)
BASOPHILS NFR BLD AUTO: 0.7 %
DIFFERENTIAL METHOD BLD: ABNORMAL
EOSINOPHIL # BLD AUTO: 0.2 10E9/L (ref 0–0.7)
EOSINOPHIL NFR BLD AUTO: 2.4 %
ERYTHROCYTE [DISTWIDTH] IN BLOOD BY AUTOMATED COUNT: 16.9 % (ref 10–15)
HCT VFR BLD AUTO: 36.2 % (ref 40–53)
HGB BLD-MCNC: 11.3 G/DL (ref 13.3–17.7)
IMM GRANULOCYTES # BLD: 0 10E9/L (ref 0–0.4)
IMM GRANULOCYTES NFR BLD: 0.1 %
LYMPHOCYTES # BLD AUTO: 2.6 10E9/L (ref 0.8–5.3)
LYMPHOCYTES NFR BLD AUTO: 33.6 %
MCH RBC QN AUTO: 28.6 PG (ref 26.5–33)
MCHC RBC AUTO-ENTMCNC: 31.2 G/DL (ref 31.5–36.5)
MCV RBC AUTO: 92 FL (ref 78–100)
MONOCYTES # BLD AUTO: 0.8 10E9/L (ref 0–1.3)
MONOCYTES NFR BLD AUTO: 11 %
NEUTROPHILS # BLD AUTO: 4 10E9/L (ref 1.6–8.3)
NEUTROPHILS NFR BLD AUTO: 52.2 %
NRBC # BLD AUTO: 0 10*3/UL
NRBC BLD AUTO-RTO: 0 /100
PLATELET # BLD AUTO: 192 10E9/L (ref 150–450)
RBC # BLD AUTO: 3.95 10E12/L (ref 4.4–5.9)
WBC # BLD AUTO: 7.6 10E9/L (ref 4–11)

## 2020-09-01 PROCEDURE — 25000132 ZZH RX MED GY IP 250 OP 250 PS 637: Performed by: STUDENT IN AN ORGANIZED HEALTH CARE EDUCATION/TRAINING PROGRAM

## 2020-09-01 PROCEDURE — 87040 BLOOD CULTURE FOR BACTERIA: CPT | Performed by: STUDENT IN AN ORGANIZED HEALTH CARE EDUCATION/TRAINING PROGRAM

## 2020-09-01 PROCEDURE — 99233 SBSQ HOSP IP/OBS HIGH 50: CPT | Mod: GC | Performed by: INTERNAL MEDICINE

## 2020-09-01 PROCEDURE — 25000125 ZZHC RX 250: Performed by: STUDENT IN AN ORGANIZED HEALTH CARE EDUCATION/TRAINING PROGRAM

## 2020-09-01 PROCEDURE — 12000001 ZZH R&B MED SURG/OB UMMC

## 2020-09-01 PROCEDURE — 25000128 H RX IP 250 OP 636: Performed by: EMERGENCY MEDICINE

## 2020-09-01 PROCEDURE — 36415 COLL VENOUS BLD VENIPUNCTURE: CPT | Performed by: STUDENT IN AN ORGANIZED HEALTH CARE EDUCATION/TRAINING PROGRAM

## 2020-09-01 PROCEDURE — 25800030 ZZH RX IP 258 OP 636: Performed by: EMERGENCY MEDICINE

## 2020-09-01 PROCEDURE — 85025 COMPLETE CBC W/AUTO DIFF WBC: CPT | Performed by: STUDENT IN AN ORGANIZED HEALTH CARE EDUCATION/TRAINING PROGRAM

## 2020-09-01 PROCEDURE — 25000128 H RX IP 250 OP 636: Performed by: STUDENT IN AN ORGANIZED HEALTH CARE EDUCATION/TRAINING PROGRAM

## 2020-09-01 PROCEDURE — 25800030 ZZH RX IP 258 OP 636: Performed by: STUDENT IN AN ORGANIZED HEALTH CARE EDUCATION/TRAINING PROGRAM

## 2020-09-01 RX ORDER — NICOTINE 21 MG/24HR
1 PATCH, TRANSDERMAL 24 HOURS TRANSDERMAL DAILY
Status: DISCONTINUED | OUTPATIENT
Start: 2020-09-01 | End: 2020-09-04 | Stop reason: HOSPADM

## 2020-09-01 RX ORDER — OXYCODONE HCL 5 MG/5 ML
5-10 SOLUTION, ORAL ORAL 3 TIMES DAILY PRN
Status: DISCONTINUED | OUTPATIENT
Start: 2020-09-01 | End: 2020-09-04 | Stop reason: HOSPADM

## 2020-09-01 RX ADMIN — DIPHENHYDRAMINE HYDROCHLORIDE 25 MG: 25 CAPSULE ORAL at 23:18

## 2020-09-01 RX ADMIN — PANTOPRAZOLE SODIUM 40 MG: 40 TABLET, DELAYED RELEASE ORAL at 08:58

## 2020-09-01 RX ADMIN — OXYCODONE HYDROCHLORIDE 10 MG: 5 SOLUTION ORAL at 12:21

## 2020-09-01 RX ADMIN — OXYCODONE HYDROCHLORIDE 10 MG: 5 SOLUTION ORAL at 23:18

## 2020-09-01 RX ADMIN — ONDANSETRON 8 MG: 4 TABLET, ORALLY DISINTEGRATING ORAL at 13:40

## 2020-09-01 RX ADMIN — DEXTROAMPHETAMINE SACCHARATE, AMPHETAMINE ASPARTATE, DEXTROAMPHETAMINE SULFATE AND AMPHETAMINE SULFATE 20 MG: 2.5; 2.5; 2.5; 2.5 TABLET ORAL at 08:58

## 2020-09-01 RX ADMIN — SUCRALFATE 1 G: 1 SUSPENSION ORAL at 05:28

## 2020-09-01 RX ADMIN — NICOTINE 1 PATCH: 21 PATCH TRANSDERMAL at 13:40

## 2020-09-01 RX ADMIN — LORAZEPAM 1 MG: 1 TABLET ORAL at 20:15

## 2020-09-01 RX ADMIN — LIDOCAINE HYDROCHLORIDE 30 ML: 20 SOLUTION ORAL; TOPICAL at 18:01

## 2020-09-01 RX ADMIN — OXYCODONE HYDROCHLORIDE 10 MG: 5 SOLUTION ORAL at 05:07

## 2020-09-01 RX ADMIN — VANCOMYCIN HYDROCHLORIDE 1250 MG: 10 INJECTION, POWDER, LYOPHILIZED, FOR SOLUTION INTRAVENOUS at 12:08

## 2020-09-01 RX ADMIN — DIPHENHYDRAMINE HYDROCHLORIDE 25 MG: 25 CAPSULE ORAL at 05:08

## 2020-09-01 RX ADMIN — VANCOMYCIN HYDROCHLORIDE 1250 MG: 10 INJECTION, POWDER, LYOPHILIZED, FOR SOLUTION INTRAVENOUS at 20:01

## 2020-09-01 RX ADMIN — SUCRALFATE 1 G: 1 SUSPENSION ORAL at 20:01

## 2020-09-01 RX ADMIN — SODIUM CHLORIDE, POTASSIUM CHLORIDE, SODIUM LACTATE AND CALCIUM CHLORIDE 1000 ML: 600; 310; 30; 20 INJECTION, SOLUTION INTRAVENOUS at 18:02

## 2020-09-01 RX ADMIN — FENTANYL 1 PATCH: 25 PATCH, EXTENDED RELEASE TRANSDERMAL at 08:59

## 2020-09-01 RX ADMIN — VANCOMYCIN HYDROCHLORIDE 1250 MG: 10 INJECTION, POWDER, LYOPHILIZED, FOR SOLUTION INTRAVENOUS at 05:11

## 2020-09-01 RX ADMIN — CARVEDILOL 6.25 MG: 6.25 TABLET, FILM COATED ORAL at 18:03

## 2020-09-01 RX ADMIN — CARVEDILOL 6.25 MG: 6.25 TABLET, FILM COATED ORAL at 08:58

## 2020-09-01 RX ADMIN — DIPHENHYDRAMINE HYDROCHLORIDE 25 MG: 25 CAPSULE ORAL at 13:40

## 2020-09-01 ASSESSMENT — ACTIVITIES OF DAILY LIVING (ADL)
FALL_HISTORY_WITHIN_LAST_SIX_MONTHS: NO
RETIRED_COMMUNICATION: 0-->UNDERSTANDS/COMMUNICATES WITHOUT DIFFICULTY
TRANSFERRING: 0-->INDEPENDENT
ADLS_ACUITY_SCORE: 9
RETIRED_EATING: 0-->INDEPENDENT
ADLS_ACUITY_SCORE: 9
DRESS: 0-->INDEPENDENT
ADLS_ACUITY_SCORE: 9
COGNITION: 0 - NO COGNITION ISSUES REPORTED
ADLS_ACUITY_SCORE: 9
ADLS_ACUITY_SCORE: 9
AMBULATION: 0-->INDEPENDENT
SWALLOWING: 0-->SWALLOWS FOODS/LIQUIDS WITHOUT DIFFICULTY
BATHING: 0-->INDEPENDENT
TOILETING: 0-->INDEPENDENT

## 2020-09-01 ASSESSMENT — PAIN DESCRIPTION - DESCRIPTORS: DESCRIPTORS: SORE

## 2020-09-01 ASSESSMENT — MIFFLIN-ST. JEOR: SCORE: 1815.13

## 2020-09-01 NOTE — PHARMACY-ADMISSION MEDICATION HISTORY
Admission Medication History Completed by Pharmacy    See [facility] Admission Navigator for allergy information, preferred outpatient pharmacy, prior to admission medications and immunization status.     Medication History Sources:     Patient    Dispense report    Changes made to PTA medication list (reason):    Added: None    Deleted:   o Multiple vitamin injection: Inject 10mL into the vein daily (per patient, now part of TPN)  o Nicotine 2mg lozenge: Place 1 lozenge inside the cheek every our as needed for smoking cessation (per patient, only used once during last hospital visit)  o Nicotine 21mg/24hr patch: Place 1 patch onto the skin daily (per patient, only used during previous hospital visit)    Changed: None    Additional Information:    Patient was a good historian of his medications. He was able to give the dosage and last time taken for each.     Patient not sure if he has ever used his alteplase injection but thinks he may have some on hand in case he ever needs it.    Patient has a monthly vitamin B12 injection but when asked he did not know what day he takes it on and thinks he may be due for his next injection.    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    Prior to Admission medications    Medication Sig Last Dose Taking? Auth Provider   acetaminophen (TYLENOL) 32 mg/mL liquid Take 15.65 mLs (500 mg) by mouth every 4 hours as needed for fever or mild pain 8/30/2020 Yes Chuy Isaac MD   amphetamine-dextroamphetamine (ADDERALL) 20 MG tablet TAKE ONE TABLET BY MOUTH ONCE DAILY 8/31/2020 Yes Chuy Isaac MD   carvedilol (COREG) 6.25 MG tablet Take 6.25 mg by mouth 2 times daily (with meals) 8/31/2020 Yes Unknown, Entered By History   cyanocobalamin (CYANOCOBALAMIN) 1000 MCG/ML injection Inject 1 mL (1,000 mcg) into the muscle every 30 days Past Month Yes Chuy Isaac MD   diphenhydrAMINE (BENADRYL) 25 MG capsule Take 1 capsule (25 mg)  "by mouth every 8 hours Give 30 minutes prior to vancomycin due to history of Tom Syndrome Past Month at Takes when on vancomycin Yes Jael Zimmerman MD   [facility] Contact [facility] for patient specific medication information at [phone] on admission and discharge from the hospital.  Phones are answered 24 hours a day 7 days a week 365 days a year.    Providers - Choose \"CONTINUE HOME MED (no script)\" at discharge if patient treatment with home infusion will continue. 8/30/2020 Yes Ilsa Shine PA   fentaNYL (DURAGESIC) 25 mcg/hr 72 hr patch Place 1 patch onto the skin every 48 hours remove old patch.   May fill 8/1/20 and start 8/3/20 8/30/2020 Yes Patti Milligan MD   lidocaine, alum & mag hydroxide-simethicone GI Cocktail 30 ml daily as needed for mouth/stomach pain. 8/31/2020 Yes Chuy Isaac MD   LORazepam (ATIVAN) 1 MG tablet TAKE 1 TABLET (1MG) BY MOUTH AS NEEDED FOR ANXIETY WITH TPN AND MEDICATIONS . JUST ONCE A DAY (30 TO LAST 30 DAYS) 8/30/2020 Yes hCuy Isaac MD   ondansetron (ZOFRAN-ODT) 8 MG ODT tab DISSOLVE ONE TABLET ON TONGUE EVERY 8 HOURS AS NEEDED FOR NAUSEA 8/31/2020 Yes Chuy Isaac MD   oxyCODONE (ROXICODONE) 5 MG/5ML solution Take 5-10 mLs (5-10 mg) by mouth 2 times daily as needed for pain Max of 20mg/day. 30 day supply. May fill on 8/8/20 to start on 8/10/20 8/31/2020 Yes Patti Milligan MD   pantoprazole (PROTONIX) 40 MG EC tablet Take 1 tablet (40 mg) by mouth daily 8/30/2020 Yes Chuy Isaac MD   parenteral nutrition - PTA/DISCHARGE ORDER Patient receives 2050 mL TPN every 14 hours through PICC at [facility]. 8/30/2020 Yes Unknown, Entered By History   sucralfate (CARAFATE) 1 GM/10ML suspension Take 1 g by mouth 4 times daily as needed 8/30/2020 Yes Unknown, Entered By History   vitamin D2 (ERGOCALCIFEROL) 45606 units (1250 mcg) capsule Take 1 capsule (50,000 Units) by mouth once a week " 8/30/2020 Yes Chuy Isaac MD   albuterol (PROAIR HFA/PROVENTIL HFA/VENTOLIN HFA) 108 (90 Base) MCG/ACT inhaler Inhale 2 puffs into the lungs every 4 hours as needed for shortness of breath / dyspnea or wheezing More than a month  Chuy Isaac MD   alteplase 2 mg/2 mL (in 10 mL syringe) Inject 1 mg into the vein as needed More than a month  Reported, Patient   naloxone (NARCAN) 4 MG/0.1ML nasal spray Spray 1 spray (4 mg) into one nostril alternating nostrils as needed for opioid reversal every 2-3 minutes until assistance arrives Never had to use  Patti Milligan MD       Date completed: 08/31/20    Medication history completed by: Jadyn Stanley

## 2020-09-01 NOTE — PROGRESS NOTES
Care Coordinator Progress Note    Admission Date/Time:  8/31/2020  Attending MD:  Eduardo Wolf, *    Data  Chart reviewed, discussed with interdisciplinary team.   Patient was admitted for: Gram-positive bacteremia.    Concerns with insurance coverage for discharge needs: None.  Current Living Situation: Patient lives with spouse.  Support System: Supportive and Involved  Services Involved: Home Infusion  Transportation at Discharge: Family or friend will provide  Transportation to Medical Appointments:   - Name of caregiver: SpouseRose  Barriers to Discharge: Medical stability, home infusion coordination    Coordination of Care and Referrals: Provided patient/family with options for Home Infusion.        Assessment  Patient is a 49yr old male with a prior medical history of s/p bariatric surgery on chronic TPN, recurrent CLABSI, gastric ulcer, short gut syndrome who was admitted w/postive blood cultures. Patient is known to writer. Patient is open to [facility] for chronic TPN and IV hydration. Patient has previously received IV abx through [facility], [facility] was previously able to secure an authoriazation through patient's insurance for IV abx coverage (patient has a medicare policy, typically does not cover home IV abx). Patient is open to [facility] for skilled nursing visits. Resumption orders have been placed at this time, will update if patient needs IV abx at discharge. RNCC will continue to follow for discharge planning.     [facility] (Chronic TPN, IV hydration)  Phone: [phone]  Fax: [phone]    [facility] (Skilled nursing visits)  Phone: [phone] Addendum:  Confirmed w/[facility], patient has IV abx coverage for IV vanco through 10/28/2020.      Plan  Anticipated Discharge Date:  TBD  Anticipated Discharge Plan:  Home w/home infusion services.     Miri Cooper, RNCC, BSN    [facility]    Medicine Group  [address]  [place] [address]    [email or web address]  [facility]    Office: [phone] Pager: [phone]  To contact the [facility] RNCC, page [phone].

## 2020-09-01 NOTE — PROGRESS NOTES
Home Infusion  Parker is currently on service with [facility] for IV therapy.  His home regimen is TPN 14hr cycle and 0.45%NS 2L daily as needed for dehydration.  Patient receives home nursing through [facility].  Will continue to follow until dc for any changes or additional needs.    CHRISTY Varghese  [facility] Nurse Liaison   [email or web address]  Cell: [phone] M-F  [facility] Main: [phone]

## 2020-09-01 NOTE — H&P
[facility]  Internal Medicine/Pediatrics  History and Physical    Date of service: 2020   Patient: Parker Acevedo      : 1970      MRN: [id number]  PCP: Chuy Isaac            Assessment/Plan:   Mr. Acevedo is a 50yo M with PMHx notable for s/p bariatric surgery on chronic TPN, recurrent CLABSI, gastric ulcer, short gut syndrome, here after approximately 2 days of fever and subsequently found to have a gram positive rods on blood culture after recent MRSE bacteremia 2/2 Cm CVC infection.       # Gram Positive Kyle Bacteremia   # Hx recurrent polymicrobial bacteremia  Patient was last admitted 2020 for 1 week of fevers, chills, and night sweats, concerning for central line infection and bacteremia. He was subsequently found to have MRSE from Cm.  Follow-up blood cultures showed clearance, with the exception of Cm port removed by IR on 2020, port tip with staph epi consistent with blood cultures from port.  He had a repeat dual-lumen Cm replaced on 8/3/2020. He completed a total of 7 days of IV vancomycin from date of port reevaluation (-). He has a history of Streptococcus and Staph Epidermidis bacteremia. He is presenting now with gram positive rods, concerning for Corynebacterium.   -- continue IV vancomycin  -- diphenhydramine to be given prior to vancomycin infusions  -- follow-up blood cultures  -- blood cultures ordered for tomorrow  -- will hold off on using Cm for TPN at this time  -- Would consider discussing case with infectious disease as to whether to remove Cm and have replaced given recurrent CLABSI. If so, will need IR consult in AM for removal of Cm      # Hx Celestino-en-Y gastric bypass, esophagojejunostomy c/b short gut syndrome and chronic malnutrition  # Chronic Abdominal Pain  Jim tube fell out prior to arrival. Will need to be replaced.  -- continue pta acetaminophen, fentanyl, oxycodone   -- naloxone  ordered  -- hold home TPN for now  -- continue GI cocktail prn  -- continue sucralfate  -- continue pta Coreg  -- continue cyanocobalamin, vitamin D2   -- Will need kassandra tube replaced - unclear if this can be done during this admission    # Asthma: continue albuterol inhaler  # ADHD: continue adderall   # Anxiety: continue lorazepam prn once daily for TPN, medication administration   # GERD: continue pta PPI   # Normocytic anemia: may be 2/2 iron deficiency anemia vs. anemia of chronic disease. Previous iron levels have been wnl.     F/E/N:  - IV Fluids: None  - Electrolytes: Replete electrolytes as needed per protocol  - Diet: no fluids overnight, but would consider in AM     VTE ppx: mechanical  Lines/Devices/Access: Cm (would hold off on using Cm at this time)  Code Status: Full code  Disposition: pending     Plan of care discussed with attending physician Dr. Ariel Duarte.      Lenka Sweeney MD  [facility]  PGY-3 Internal Medicine-Pediatrics  [facility]            Chief complaint:   Fever          History of Present Illness:   Mr. Acevedo is a 50yo M with PMHx notable for s/p bariatric surgery on chronic TPN, recurrent CLABSI, gastric ulcer, short gut syndrome, and history of MRSE bacteremia, here after approximately 2 days of fever and subsequently found to have a gram positive rods on blood culture.       Per chart review, patient's wife called [facility] on 8/27/2020 due to the patient being febrile for 2 days.  He had a T-max of 101.3  F. An order was obtained to have blood cultures drawn.  On 8/30/2020, he was found to have a positive blood culture, which was growing gram-positive rods.  The patient was advised to present to the emergency department for further care.    Patient was last admitted 7/28/2020 for 1 week of fevers, chills, and night sweats, concerning for central line infection and bacteremia.  He was subsequently found to have  MRSE from Cm.  Follow-up blood cultures showed clearance, with the exception of Cm port removed by IR on 7/30/2020, port tip with staph epi consistent with blood cultures from port.  He had a repeat dual-lumen Cm replaced on 8/3/2020.  He completed a total of 7 days of IV vancomycin from date of port reevaluation (7/30-8/6). Of note, patient has a Jim tube.  This fell out 1 day prior to presentation.    Emergency Department Course:  In the ED, vitals significant for 137/84. Labs were significant for albumin 3.2 hemoglobin 11.9. Blood cultures drawn. CXR showed clear lungs.  A point-of-care echocardiogram was obtained. He received Benadryl x1 and vancomycin x1. Parker Acevedo was admitted to the inpatient unit for further work-up and care.            Review of Systems:   10-point ROS reviewed & found negative w/ exceptions noted in the HPI.         Allergies:     Allergies   Allergen Reactions     Bactrim [Sulfamethoxazole W/Trimethoprim] Rash     Penicillins Anaphylaxis     Please see Antimicrobial Management Team allergy assessment note 10/10/2018. Patient reported tolerating amoxicillin.     Doxycycline Rash     Vancomycin Rash     Rash after receiving vancomycin 3/28/16 (red man's?). Tolerated with slower infusion and diphenhydramine premed.             Medications:   No current facility-administered medications on file prior to encounter.   acetaminophen (TYLENOL) 32 mg/mL liquid, Take 15.65 mLs (500 mg) by mouth every 4 hours as needed for fever or mild pain  amphetamine-dextroamphetamine (ADDERALL) 20 MG tablet, TAKE ONE TABLET BY MOUTH ONCE DAILY  carvedilol (COREG) 6.25 MG tablet, Take 6.25 mg by mouth 2 times daily (with meals)  cyanocobalamin (CYANOCOBALAMIN) 1000 MCG/ML injection, Inject 1 mL (1,000 mcg) into the muscle every 30 days  diphenhydrAMINE (BENADRYL) 25 MG capsule, Take 1 capsule (25 mg) by mouth every 8 hours Give 30 minutes prior to vancomycin due to history of Tom  "Syndrome  [facility] MANAGED PATIENT, Contact [facility] for patient specific medication information at [phone] on admission and discharge from the hospital.  Phones are answered 24 hours a day 7 days a week 365 days a year.    Providers - Choose \"CONTINUE HOME MED (no script)\" at discharge if patient treatment with home infusion will continue.  fentaNYL (DURAGESIC) 25 mcg/hr 72 hr patch, Place 1 patch onto the skin every 48 hours remove old patch.   May fill 8/1/20 and start 8/3/20  lidocaine, alum & mag hydroxide-simethicone GI Cocktail, 30 ml daily as needed for mouth/stomach pain.  LORazepam (ATIVAN) 1 MG tablet, TAKE 1 TABLET (1MG) BY MOUTH AS NEEDED FOR ANXIETY WITH TPN AND MEDICATIONS . JUST ONCE A DAY (30 TO LAST 30 DAYS)  ondansetron (ZOFRAN-ODT) 8 MG ODT tab, DISSOLVE ONE TABLET ON TONGUE EVERY 8 HOURS AS NEEDED FOR NAUSEA  oxyCODONE (ROXICODONE) 5 MG/5ML solution, Take 5-10 mLs (5-10 mg) by mouth 2 times daily as needed for pain Max of 20mg/day. 30 day supply. May fill on 8/8/20 to start on 8/10/20  pantoprazole (PROTONIX) 40 MG EC tablet, Take 1 tablet (40 mg) by mouth daily  parenteral nutrition - PTA/DISCHARGE ORDER, Patient receives 2050 mL TPN every 14 hours through PICC at [facility].  sucralfate (CARAFATE) 1 GM/10ML suspension, Take 1 g by mouth 4 times daily as needed  vitamin D2 (ERGOCALCIFEROL) 74539 units (1250 mcg) capsule, Take 1 capsule (50,000 Units) by mouth once a week  albuterol (PROAIR HFA/PROVENTIL HFA/VENTOLIN HFA) 108 (90 Base) MCG/ACT inhaler, Inhale 2 puffs into the lungs every 4 hours as needed for shortness of breath / dyspnea or wheezing  alteplase 2 mg/2 mL (in 10 mL syringe), Inject 1 mg into the vein as needed  naloxone (NARCAN) 4 MG/0.1ML nasal spray, Spray 1 spray (4 mg) into one nostril alternating nostrils as needed for opioid reversal every 2-3 minutes until assistance arrives  [DISCONTINUED] NO ACTIVE MEDICATIONS, .             "  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     Complication of anesthesia      Difficulty swallowing      Gastric ulcer, unspecified as acute or chronic, without mention of hemorrhage, perforation, or obstruction      Gastro-oesophageal reflux disease      Head injury      Hiatal hernia      Other bladder disorder      Other chronic pain      PONV (postoperative nausea and vomiting)      Severe malnutrition (H)     TPN     Short gut syndrome      Tobacco abuse              Past Surgical History:     Past Surgical History:   Procedure Laterality Date     AMPUTATION       APPENDECTOMY       BACK SURGERY  11/3/2014    curve in the spine     BIOPSY LYMPH NODE CERVICAL N/A 2/20/2015    Procedure: BIOPSY LYMPH NODE CERVICAL;  Surgeon: Baron Scanlon MD;  Location: [facility] OR     C GASTRIC BYPASS,OBESE<100CM SHAYLEE-EN-Y  2002    lost 300 pounds     CHOLECYSTECTOMY       DISCECTOMY, FUSION CERVICAL ANTERIOR ONE LEVEL, COMBINED N/A 2/15/2017    Procedure: COMBINED DISCECTOMY, FUSION CERVICAL ANTERIOR ONE LEVEL;  Surgeon: Darren Campos MD;  Location: [facility]     ENDOSCOPIC INSERTION TUBE GASTROSTOMY  9/9/2013    Procedure: ENDOSCOPIC INSERTION TUBE GASTROSTOMY;;  Surgeon: Francis Vyas MD;  [facility]     ENDOSCOPIC ULTRASOUND UPPER GASTROINTESTINAL TRACT (GI)  4/29/2011    Procedure:ENDOSCOPIC ULTRASOUND UPPER GASTROINTESTINAL TRACT (GI); Both Procedures done Conjointly; Surgeon:NEREIDA HOUSER; [facility]     ENDOSCOPIC ULTRASOUND UPPER GASTROINTESTINAL TRACT (GI)  9/9/2013    Procedure: ENDOSCOPIC ULTRASOUND UPPER GASTROINTESTINAL TRACT (GI);  Endoscopic Ultrasound Guide Gastrostomy Tube Placement  C-arm;   Surgeon: Noe Lizarraga MD;  [facility]     ENDOSCOPY  03/25/11    EGD, [facility]     ENDOSCOPY  08/04/09    Upper Endoscopy, [facility]     ENDOSCOPY  01/05/09    Upper Endoscopy, [facility]     ESOPHAGOSCOPY, GASTROSCOPY, DUODENOSCOPY (EGD), COMBINED  4/20/2011    Procedure:COMBINED ESOPHAGOSCOPY, GASTROSCOPY, DUODENOSCOPY (EGD); Surgeon:FRANCIS VYAS; [facility]     ESOPHAGOSCOPY, GASTROSCOPY, DUODENOSCOPY (EGD), COMBINED  6/15/2011    Procedure:COMBINED ESOPHAGOSCOPY, GASTROSCOPY, DUODENOSCOPY (EGD); Surgeon:FRANCIS VYAS; [facility]     ESOPHAGOSCOPY, GASTROSCOPY, DUODENOSCOPY (EGD), COMBINED  6/12/2013    Procedure: COMBINED ESOPHAGOSCOPY, GASTROSCOPY, DUODENOSCOPY (EGD);;  Surgeon: Francis Vyas MD;  [facility]     ESOPHAGOSCOPY, GASTROSCOPY, DUODENOSCOPY (EGD), COMBINED  11/22/2013    Procedure: COMBINED ESOPHAGOSCOPY, GASTROSCOPY, DUODENOSCOPY (EGD);;  Surgeon: Francis Vyas MD;  [facility]     ESOPHAGOSCOPY, GASTROSCOPY, DUODENOSCOPY (EGD), COMBINED  4/30/2014    Procedure: COMBINED ESOPHAGOSCOPY, GASTROSCOPY, DUODENOSCOPY (EGD);  Surgeon: Francis Vyas MD;  [facility]     ESOPHAGOSCOPY, GASTROSCOPY, DUODENOSCOPY (EGD), COMBINED N/A 2/20/2015    Procedure: COMBINED ESOPHAGOSCOPY, GASTROSCOPY, DUODENOSCOPY (EGD), BIOPSY SINGLE OR MULTIPLE;  Surgeon: Baron Scanlon MD;  Location:  OR     ESOPHAGOSCOPY, GASTROSCOPY, DUODENOSCOPY (EGD), COMBINED N/A 9/30/2015    Procedure: COMBINED ESOPHAGOSCOPY, GASTROSCOPY, DUODENOSCOPY (EGD);  Surgeon: Francis Vyas MD;  [facility]     ESOPHAGOSCOPY, GASTROSCOPY, DUODENOSCOPY (EGD), COMBINED N/A 10/3/2019    Procedure: Upper Endoscopy;  Surgeon: Clif Morrow MD;  [facility]     GASTRECTOMY  6/22/2012    Procedure: GASTRECTOMY;  Open Approach, Excise Ulcers,Partial Gastrectomy, Esophagojejunostomy, Hiatal Hernia Repair, Extensive Lysis of Adhesions  and Esaphagogastrodudenoscopy.;  Surgeon: Francis Vyas MD;  [facility]     GASTROJEJUNOSTOMY  08/26/09    Extensice enterolysis, partial resect. jejunum, part. resect gastric pouch, gastrojejunostomy anastomosis     HC ESOPH/GAS REFLUX TEST W NASAL IMPED ELECTRODE  8/5/2013    Procedure: ESOPHAGEAL IMPEDENCE FUNCTION TEST 1 HOUR OR LESS;  Surgeon: Halie Lang MD;  [facility]     HEAD & NECK SURGERY  2/15/2017    C5-C6     HERNIA REPAIR  2006    Umbilical hernia     HERNIORRHAPHY HIATAL  6/22/2012    Procedure: HERNIORRHAPHY HIATAL;;  Surgeon: Francis Vyas MD;  [facility]     HERNIORRHAPHY INGUINAL  11/22/2013    Procedure: HERNIORRHAPHY INGUINAL;;  Surgeon: Francis Vyas MD;  [facility]     INSERT PICC LINE Right 12/19/2019    Procedure: Picc Placement;  Surgeon: Per Dumont PA-C;  [facility]     INSERT PICC LINE Right 2/21/2020    Procedure: INSERTION, PICC;  Surgeon: Per Dumont PA-C;  [facility]     INSERT PORT VASCULAR ACCESS Right 12/19/2017    Procedure: INSERT PORT VASCULAR ACCESS;  Right Chest Port Placement ;  Surgeon: Lisandro Alejandro PA-C;  [facility]     INSERT PORT VASCULAR ACCESS Right 8/2/2018    Procedure: INSERT PORT VASCULAR ACCESS;  Place single lumen tunneled central venous access catheter;  Surgeon: Guy Jamil PA-C;  [facility]     IR CVC TUNNEL PLACEMENT > 5 YRS OF AGE  8/7/2019     IR CVC TUNNEL PLACEMENT > 5 YRS OF AGE  4/14/2020     IR CVC TUNNEL PLACEMENT > 5 YRS OF AGE  8/3/2020     IR CVC TUNNEL REMOVAL RIGHT  10/1/2019     IR CVC TUNNEL REMOVAL RIGHT  7/30/2020     IR FOLLOW UP VISIT OUTPATIENT  8/7/2019     IR PICC PLACEMENT > 5 YRS OF AGE  3/7/2019     IR PICC PLACEMENT > 5 YRS OF AGE  12/19/2019     IR PICC PLACEMENT > 5 YRS OF AGE  2/21/2020     LAPAROTOMY EXPLORATORY  11/22/2013    Procedure: LAPAROTOMY EXPLORATORY;  Exploratory Laparotomy, Upper Endoscopy, Left Inguinal  Hernia Repair;  Surgeon: Francis Vyas MD;  [facility]     ORTHOPEDIC SURGERY       PICC INSERTION Right 03/16/2017    5fr DL BioFlo PICC, 42cm (3cm external) in the R medial brachial vein w/ tip in the SVC RA junction.     PICC INSERTION Left 09/23/2017    5fr DL BioFlo PICC, 45cm (1cm external) in the L basilic vein w/ tip in the SVC RA junction.     PICC INSERTION Right 05/16/2019    5Fr - 43cm, Medial brachial vein, low SVC     PICC INSERTION Right 10/02/2019    5Fr - 43cm (2cm external), basilic vein, low SVC     SHAYLEE EN Y BOWEL  2003     SOFT TISSUE SURGERY       THORACIC SURGERY       TONSILLECTOMY       TRANSESOPHAGEAL ECHOCARDIOGRAM INTRAOPERATIVE N/A 1/8/2019    Procedure: TRANSESOPHAGEAL ECHOCARDIOGRAM INTRAOPERATIVE;  Surgeon: GENERIC ANESTHESIA PROVIDER;  [facility]            Family History:     Family History   Problem Relation Age of Onset     Gastrointestinal Disease Mother         Crohns disease     Anxiety Disorder Mother      Thyroid Disease Mother         Grave's disease     Cancer Father         ear cancer-skin cancer/melanoma     Breast Cancer Maternal Grandmother      Macular Degeneration Maternal Grandfather      Anxiety Disorder Sister      Diabetes Maternal Uncle      Breast Cancer Other      Hypertension No family hx of      Hyperlipidemia No family hx of      Cerebrovascular Disease No family hx of      Prostate Cancer No family hx of      Depression No family hx of      Anesthesia Reaction No family hx of      Asthma No family hx of      Osteoporosis No family hx of      Genetic Disorder No family hx of      Obesity No family hx of      Mental Illness No family hx of      Substance Abuse No family hx of      Glaucoma No family hx of              Social History:     Social History     Socioeconomic History     Marital status:      Spouse name: Rose     Number of children: 1     Years of education: Not on file     Highest education level: GED or equivalent  "  Occupational History     Not on file   Social Needs     Financial resource strain: Not hard at all     Food insecurity     Worry: Never true     Inability: Never true     Transportation needs     Medical: No     Non-medical: No   Tobacco Use     Smoking status: Current Some Day Smoker     Packs/day: 0.25     Years: 3.00     Pack years: 0.75     Types: Cigarettes     Smokeless tobacco: Never Used   Substance and Sexual Activity     Alcohol use: No     Frequency: Never     Drinks per session: Patient refused     Binge frequency: Never     Comment: quit      Drug use: No     Sexual activity: Yes     Partners: Female     Birth control/protection: None     Comment: no protection   Lifestyle     Physical activity     Days per week: 2 days     Minutes per session: 10 min     Stress: Only a little   Relationships     Social connections     Talks on phone: Once a week     Gets together: Never     Attends Tenriism service: Never     Active member of club or organization: No     Attends meetings of clubs or organizations: Never     Relationship status:      Intimate partner violence     Fear of current or ex partner: No     Emotionally abused: No     Physically abused: No     Forced sexual activity: No   Other Topics Concern     Parent/sibling w/ CABG, MI or angioplasty before 65F 55M? No   Social History Narrative     Not on file             Physical Exam:   BP (!) 130/97   Pulse 76   Temp 99.8  F (37.7  C) (Oral)   Resp 16   Ht 1.803 m (5' 11\")   Wt 87.1 kg (192 lb)   SpO2 100%   BMI 26.78 kg/m    Temp (24hrs), Av.8  F (37.7  C), Min:99.8  F (37.7  C), Max:99.8  F (37.7  C)    Wt Readings from Last 2 Encounters:   20 87.1 kg (192 lb)   20 92.9 kg (204 lb 14.4 oz)     General Appearance: Well developed, well nourished, in no acute distress.   Skin: No rashes, ulcerations, or petechiae.  HEENT: PERRLA, EOMI intact, anicteric sclera, clear conjunctiva, normal external ear anatomy, normal " nose anatomy, no lesions, scars, or masses. Normal mucosa lips, teeth, and gums. The oral mucosa, hard and soft palate, tongue and posterior pharynx were normal.  Neck: Supple and symmetric, no thyromegaly, no tenderness, no masses.  Cardiovascular: RRR, no murmurs, rubs, or gallops. Normal carotid pulses. Peripheral pulses were 2+ and symmetric.  Lungs: clear to ascultation bilaterally, no rales, wheezes or rhonchi.   Abdomen: Non-tender, non-distended abdomen, normal bowel sounds. Liver span was approx 5-6 cm in the right midclavicular line by percussion. The liver edge was nontender. Non-palpable spleen. No inguinal or umbilical hernias, no ascites.  Musculoskeletal: Normal gait, no tenderness or effusions noted. Muscle strength and tone were normal.  Extremities: No cyanosis, clubbing or edema.  Neurologic: Alert and oriented x 3. Normal affect. CX II-XII grossly intact. Normal sensation.           Data:     Results for orders placed or performed during the hospital encounter of 08/31/20 (from the past 24 hour(s))   CBC with platelets differential   Result Value Ref Range    WBC 9.2 4.0 - 11.0 10e9/L    RBC Count 4.23 (L) 4.4 - 5.9 10e12/L    Hemoglobin 11.9 (L) 13.3 - 17.7 g/dL    Hematocrit 38.3 (L) 40.0 - 53.0 %    MCV 91 78 - 100 fl    MCH 28.1 26.5 - 33.0 pg    MCHC 31.1 (L) 31.5 - 36.5 g/dL    RDW 16.9 (H) 10.0 - 15.0 %    Platelet Count 220 150 - 450 10e9/L    Diff Method Automated Method     % Neutrophils 66.0 %    % Lymphocytes 22.7 %    % Monocytes 8.8 %    % Eosinophils 1.5 %    % Basophils 0.8 %    % Immature Granulocytes 0.2 %    Nucleated RBCs 0 0 /100    Absolute Neutrophil 6.1 1.6 - 8.3 10e9/L    Absolute Lymphocytes 2.1 0.8 - 5.3 10e9/L    Absolute Monocytes 0.8 0.0 - 1.3 10e9/L    Absolute Eosinophils 0.1 0.0 - 0.7 10e9/L    Absolute Basophils 0.1 0.0 - 0.2 10e9/L    Abs Immature Granulocytes 0.0 0 - 0.4 10e9/L    Absolute Nucleated RBC 0.0    Comprehensive metabolic panel   Result Value Ref  Range    Sodium 141 133 - 144 mmol/L    Potassium 4.0 3.4 - 5.3 mmol/L    Chloride 112 (H) 94 - 109 mmol/L    Carbon Dioxide 26 20 - 32 mmol/L    Anion Gap 3 3 - 14 mmol/L    Glucose 82 70 - 99 mg/dL    Urea Nitrogen 13 7 - 30 mg/dL    Creatinine 0.63 (L) 0.66 - 1.25 mg/dL    GFR Estimate >90 >60 mL/min/[1.73_m2]    GFR Estimate If Black >90 >60 mL/min/[1.73_m2]    Calcium 8.1 (L) 8.5 - 10.1 mg/dL    Bilirubin Total 0.3 0.2 - 1.3 mg/dL    Albumin 3.2 (L) 3.4 - 5.0 g/dL    Protein Total 6.5 (L) 6.8 - 8.8 g/dL    Alkaline Phosphatase 69 40 - 150 U/L    ALT 25 0 - 70 U/L    AST 12 0 - 45 U/L   Blood culture    Specimen: Central Line; Blood    Red port   Result Value Ref Range    Specimen Description Blood Red port     Culture Micro PENDING    Lactic acid whole blood   Result Value Ref Range    Lactic Acid 1.4 0.7 - 2.0 mmol/L   Blood culture    Specimen: Arm, Left; Blood    Left Arm   Result Value Ref Range    Specimen Description Blood Left Arm     Culture Micro PENDING    XR Chest Port 1 View    Narrative    Exam:  Chest X-ray 8/31/2020 6:45 PM    History: Fever    Comparison: X-ray 7/28/2020    Findings: Frontal radiograph of the chest. Right chest wall  Port-A-Cath with the tip terminating over the cavoatrial junction. The  trachea is midline. Stable cardiac mediastinal silhouette. No pleural  effusion. The pulmonary vessels are distinct. No focal pulmonary  opacity or pneumothorax. The upper abdomen is unremarkable. No acute  bone findings.      Impression    Impression: Clear lungs.    I have personally reviewed the examination and initial interpretation  and I agree with the findings.    CLEVE MARTIN MD     *Note: Due to a large number of results and/or encounters for the requested time period, some results have not been displayed. A complete set of results can be found in Results Review.

## 2020-09-01 NOTE — ED NOTES
[facility], [place]   ED Nurse to Floor Handoff     Parker Acevedo is a 49 year old male who speaks English and lives with a spouse,  in a home  They arrived in the ED by car from home    ED Chief Complaint: Abnormal Labs and Fever    ED Dx;   Final diagnoses:   None         Needed?: No    Allergies:   Allergies   Allergen Reactions     Bactrim [Sulfamethoxazole W/Trimethoprim] Rash     Penicillins Anaphylaxis     Please see Antimicrobial Management Team allergy assessment note 10/10/2018. Patient reported tolerating amoxicillin.     Doxycycline Rash     Vancomycin Rash     Rash after receiving vancomycin 3/28/16 (red man's?). Tolerated with slower infusion and diphenhydramine premed.   .  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     Complication of anesthesia      Difficulty swallowing      Gastric ulcer, unspecified as acute or chronic, without mention of hemorrhage, perforation, or obstruction      Gastro-oesophageal reflux disease      Head injury      Hiatal hernia      Other bladder disorder      Other chronic pain      PONV (postoperative nausea and vomiting)      Severe malnutrition (H)     TPN     Short gut syndrome      Tobacco abuse       Baseline Mental status: WDL  Current Mental Status changes: at basesline    Infection present or suspected this encounter: yes other bacteremia  Sepsis suspected: No  Isolation type: No active isolations     Activity level - Baseline/Home:  Independent  Activity Level - Current:   Independent    Bariatric equipment needed?: No    In the ED these meds were given:   Medications   sodium chloride (PF) 0.9% PF flush 3 mL (has no administration  in time range)   sodium chloride (PF) 0.9% PF flush 3 mL (has no administration in time range)   vancomycin (VANCOCIN) 2,250 mg in sodium chloride 0.9 % 500 mL intermittent infusion (2,250 mg Intravenous New Bag 8/31/20 2001)   vancomycin 1250 mg in 0.9% NaCl 250 mL intermittent infusion 1,250 mg (has no administration in time range)   lidocaine (XYLOCAINE) 2 % 15 mL, alum & mag hydroxide-simethicone (MAALOX  ES) 15 mL GI Cocktail (30 mLs Oral Given 8/31/20 1909)   diphenhydrAMINE (BENADRYL) injection 25 mg (25 mg Intravenous Given 8/31/20 1958)       Drips running?  No    Home pump  No    Current LDAs  Peripheral IV 08/31/20 Left Upper forearm (Active)   Site Assessment WDL 08/31/20 1835   Line Status Saline locked 08/31/20 1835   Number of days: 0       CVC Double Lumen 08/03/20 Right Internal jugular (Active)   Number of days: 28       Labs results:   Labs Ordered and Resulted from Time of ED Arrival Up to the Time of Departure from the ED   CBC WITH PLATELETS DIFFERENTIAL - Abnormal; Notable for the following components:       Result Value    RBC Count 4.23 (*)     Hemoglobin 11.9 (*)     Hematocrit 38.3 (*)     MCHC 31.1 (*)     RDW 16.9 (*)     All other components within normal limits   COMPREHENSIVE METABOLIC PANEL - Abnormal; Notable for the following components:    Chloride 112 (*)     Creatinine 0.63 (*)     Calcium 8.1 (*)     Albumin 3.2 (*)     Protein Total 6.5 (*)     All other components within normal limits   LACTIC ACID WHOLE BLOOD   COVID-19 VIRUS (CORONAVIRUS) BY PCR   PULSE OXIMETRY NURSING   CARDIAC CONTINUOUS MONITORING   STRICT INTAKE AND OUTPUT   PERIPHERAL IV CATHETER   BLOOD CULTURE   BLOOD CULTURE       Imaging Studies:   Recent Results (from the past 24 hour(s))   XR Chest Port 1 View    Narrative    Exam:  Chest X-ray 8/31/2020 6:45 PM    History: Fever    Comparison: X-ray 7/28/2020    Findings: Frontal radiograph of the chest. Right chest wall  Port-A-Cath with the tip terminating  "over the cavoatrial junction. The  trachea is midline. Stable cardiac mediastinal silhouette. No pleural  effusion. The pulmonary vessels are distinct. No focal pulmonary  opacity or pneumothorax. The upper abdomen is unremarkable. No acute  bone findings.      Impression    Impression: Clear lungs.    I have personally reviewed the examination and initial interpretation  and I agree with the findings.    CLEVE MARTIN MD       Recent vital signs:   BP (!) 130/97   Pulse 76   Temp 99.8  F (37.7  C) (Oral)   Resp 16   Ht 1.803 m (5' 11\")   Wt 87.1 kg (192 lb)   SpO2 100%   BMI 26.78 kg/m      [place] Coma Scale Score: 15 (08/31/20 1619)       Cardiac Rhythm: Normal Sinus  Pt needs tele? No  Skin/wound Issues: None    Code Status: Full Code    Pain control: fair    Nausea control: fair    Abnormal labs/tests/findings requiring intervention: see results review    Family present during ED course? No   Family Comments/Social Situation comments: n/a    Tasks needing completion: None    Carmencita Johnson RN  [phone] [facility]      "

## 2020-09-01 NOTE — PROGRESS NOTES
[facility], [place]    Progress Note - Maroon 2 Service   Date of Admission:  8/31/2020    Assessment & Plan    Mr. Acevedo is a 48yo M with PMHx notable for s/p bariatric surgery on chronicTPN, recurrent CLABSI, gastric ulcer, short gut syndrome, here after approximately 2 days of fever and subsequently found to have a gram positive rods on blood culture after recent MRSE bacteremia 2/2 Cm CVC infection.      Changes today:  - need to remove Cm given drainage  - continue IV vanc  - 1L LR, no TPN     # Gram Positive Rods & Cocci bacteremia   # Hx recurrent polymicrobial bacteremia  Pt admitted 7/28/2020 for 1 week of fevers, chills, and night sweats, concerning for central line infection and bacteremia. Had MRSE from Cm CVC.  Bcx cleared except Cm port removed by IR on 7/30/2020, port tip with staph epi consistent with blood cultures from port.  Repeat dual-lumen Cm replaced on 8/3/2020. Completed 7d IV vancomycin from date of port reevaluation (7/30-8/6). Has hx of Streptococcus and Staph Epidermidis bacteremia. He is presenting now with gram positive rods and cocci.   - ID consulted, appreciate recs   - continue IV vancomycin (8/31-)   - PO benadryl given with vancomycin    - remove Cm CVC (IR order placed) given drainage  - hold off on TPN, 1L LR over 10 hrs  - Cultures:   - repeat cultures 9/2   - 9/1 L hand x2: NG   - 8/31 port, L arm: NG     - 8/29 R hand: gram + rods and gram + cocci              - 8/28 port x2: NG  - Lyme titers ordered     # Hx Celestino-en-Y gastric bypass, esophagojejunostomy c/b short gut syndrome and chronic malnutrition  # Chronic Abdominal Pain  ELISA KEY tube fell out prior to arrival - pt uses to vent. Will need to be replaced.  - replace ELISA SHAH if possible this admission  - hold TPN for now, giving LR IVF  - GI cocktail prn, sucralfate   - continue cyanocobalamin, vitamin D2   - pain management:    - pta acetaminophen,  fentanyl, oxycodone    - oxycodone increased to TID during admission     #HTN: continue coreg  # Asthma: continue albuterol inhaler  # ADHD: continue adderall   # Anxiety: continue lorazepam prn once daily for TPN, medication administration   # GERD: continue pta PPI   # Normocytic anemia: may be 2/2 iron deficiency anemia vs. anemia of chronic disease. Previous iron levels have been wnl.      F/E/N:  - IV Fluids: 1L LR  - Electrolytes: Replete electrolytes as needed per protocol  - Diet: no fluids overnight, but would consider in AM      Diet: NPO for Medical/Clinical Reasons Except for: Meds    VTE ppx: mechanical  Lines/Devices/Access: Cm (to be removed)  Code Status: Full code  Disposition: pending     Disposition Plan   Expected discharge: 2 - 3 days, recommended to prior living arrangement once antibiotic plan established.  Entered: Andrew Alvarado MD 09/01/2020, 2:49 PM     The patient's care was discussed with the Attending Physician, Dr. Wolf.    Andrew Alvarado MD  [address], [place]  Pager: [phone].  Please see sticky note for cross cover information  ______________________________________________________________________    Interval History   NAEO. Pt doing well this AM. Reports he has some abdominal pain, wondering if pain regimen can be increased. Reports he has been in the hospital many times and is familiar with course of treatment.     Data reviewed today: I reviewed all medications, new labs and imaging results over the last 24 hours. I personally reviewed no images or EKG's today.    Physical Exam   Vital Signs: Temp: 99.3  F (37.4  C) Temp src: Oral BP: (!) 140/105 Pulse: 75   Resp: 18 SpO2: 96 % O2 Device: None (Room air)    Weight: 202 lbs 6.12 oz  Constitutional: sitting up in bed in no acute distress, glasses on   Eyes: EOMI, no conjunctival injection  Respiratory: No increased work of breathing, good air exchange, clear to auscultation  bilaterally, no crackles or wheezing  Cardiovascular: RRR, pulses 2+  GI: soft, non tender, non distended; G tube site mildly erythematous, not draining  Skin: no bruising or bleeding, normal skin color, texture, turgor and no redness, warmth, or swelling  Musculoskeletal: no peripheral edema  Neurologic: alert and oriented    Data   Recent Labs   Lab 09/01/20  0550 08/31/20  1822   WBC 7.6 9.2   HGB 11.3* 11.9*   MCV 92 91    220   NA  --  141   POTASSIUM  --  4.0   CHLORIDE  --  112*   CO2  --  26   BUN  --  13   CR  --  0.63*   ANIONGAP  --  3   SILAS  --  8.1*   GLC  --  82   ALBUMIN  --  3.2*   PROTTOTAL  --  6.5*   BILITOTAL  --  0.3   ALKPHOS  --  69   ALT  --  25   AST  --  12     Recent Results (from the past 24 hour(s))   POC US ECHO LIMITED    Impression    Limited Bedside Cardiac Ultrasound, performed and interpreted by me.   Indication: Bacteremia  Parasternal long axis, parasternal short axis, apical 4 chamber and subcostal views were acquired.   Image quality was satisfactory.    Findings:    Global left ventricular function appears intact.  Chambers do not appear dilated.  There is no evidence of free fluid within the pericardium.    IMPRESSION: Grossly normal left ventricular function and chamber size.  No pericardial effusion.  No gross valvular regurgitation visualized.   XR Chest Port 1 View    Narrative    Exam:  Chest X-ray 8/31/2020 6:45 PM    History: Fever    Comparison: X-ray 7/28/2020    Findings: Frontal radiograph of the chest. Right chest wall  Port-A-Cath with the tip terminating over the cavoatrial junction. The  trachea is midline. Stable cardiac mediastinal silhouette. No pleural  effusion. The pulmonary vessels are distinct. No focal pulmonary  opacity or pneumothorax. The upper abdomen is unremarkable. No acute  bone findings.      Impression    Impression: Clear lungs.    I have personally reviewed the examination and initial interpretation  and I agree with the  findings.    CLEVE MARTIN MD     Medications       amphetamine-dextroamphetamine  20 mg Oral Daily     carvedilol  6.25 mg Oral BID w/meals     [START ON 9/7/2020] cyanocobalamin  1,000 mcg Intramuscular Q30 Days     diphenhydrAMINE  25 mg Oral Q8H     fentaNYL  25 mcg Transdermal Q48H     fentaNYL   Transdermal Q8H     lactated ringers  1,000 mL Intravenous Once     lidocaine viscous 2% 15 mL and maalox/mylanta w/ simethicone 15 mL (GI COCKTAIL)  30 mL Oral At Bedtime     nicotine  1 patch Transdermal Daily     nicotine   Transdermal Q8H     pantoprazole  40 mg Oral Daily     sodium chloride (PF)  3 mL Intracatheter Q8H     vancomycin (VANCOCIN) IV  1,250 mg Intravenous Q8H     [START ON 9/6/2020] vitamin D2  50,000 Units Oral Weekly

## 2020-09-01 NOTE — PROVIDER NOTIFICATION
Patient , rquesting for nicotine patch. Page placed [phone]

## 2020-09-01 NOTE — PLAN OF CARE
"Pt arrived to  at 0014. Pt is A&O x4. Pt reports moderate abdominal pain that is being managed with PRN oxy. Pt reported indigestion and nausea that was managed with PRN Carafate. Lung sounds clear; Skin is intact ex for previous G tube site. Central line in place--caps changed. Pt is up independently and ambulates in his room. Pt is voiding spontaneously. Pt is NPO ex for meds at this time. Will continue to monitor.     /62 (BP Location: Right arm)   Pulse 64   Temp 98.1  F (36.7  C) (Oral)   Resp 16   Ht 1.803 m (5' 11\")   Wt 91.8 kg (202 lb 6.1 oz)   SpO2 95%   BMI 28.23 kg/m     "

## 2020-09-01 NOTE — PLAN OF CARE
Patient A & O X 4, No respiratory distress noted. Remain NPO except meds. C/o abdominal pain and received Oxycodone 10 mg po x 1 , c/o Nausea received Zofran 8mg po x 1 . No emesis report. Received Vancomycin IV and tolerated well. Patient up walking independently in hallway , void adequate and no bowel movement on this shift. Old G- tube site with drain , cleaned and applied dressing on. Continue with POC and update MD with concerns.

## 2020-09-01 NOTE — PROVIDER NOTIFICATION
"Writer paged MD:    \"Pt is requesting GI cocktail and refuses to take anything of his meds without it.\"    MD placed BID prn orders for the GI cocktail    Penny Alvarado RN on 9/1/2020 at 5:45 PM    "

## 2020-09-01 NOTE — PROVIDER NOTIFICATION
"Writer paged MD:    \"Pt is requesting to have his oxycodone increased to three times a day.\"    Penny Alvarado RN on 9/1/2020 at 3:42 PM    "

## 2020-09-01 NOTE — PROVIDER NOTIFICATION
Lab called with blood ct result on right hand 8/29/20 Gram positive cocci . Page placed to MD (Andrew Alvarado [phone] )

## 2020-09-01 NOTE — PROVIDER NOTIFICATION
"Writer paged MD:    \"Pt vomited streaks of blood mixed with yellow mucus.\"    Penny Alvarado RN on 9/1/2020 at 6:16 PM    "

## 2020-09-01 NOTE — CONSULTS
GENERAL ID SERVICE CONSULTATION     Patient:  Parker Acevedo   Date of birth 1970, Medical record number [id number]  Date of Visit:  09/01/2020  Date of Admission: 8/31/2020  Consult Requester:Eduardo Wolf          Assessment and Recommendations:   ASSESSMENT:  1. Concern for intravascular non-hemodialysis catheter-related infection - drainage from line  2. TPN dependent  3.  Single positive blood culture with Gram positive rods and GPCs    DISCUSSION:     Parker Acevedo is a 50yo man who is TPN dependent has a history of recurrent episodes of bacteremia secondary to central line associated blood stream infection. His Cm was replaced most recently on 8/3 and he reports ongoing drainage from it since placement, which he has not experienced with previous lines. Patient's fever, night sweats, chills, headache, joint pain started on 8/27 with blood cultures showing G+ rods. Patient presented to the ED on 8/30.     Given that the patient has spent time in woody areas in [facility] and has noticed ticks (though does not recall a tick bite), we are considering Lyme disease which could manifest similarly - and tick bites are often not noticed. Also considering Anaplasma/Ehrlichia given geographic distrubtion. Lyme titers at this time would be prudent. More likely, however, is that this patient has central line associated bacteremia. Patient's cultures from the central line did not grow organisms, but peripheral cultures did. It is possible that patient's peripheral cultures are growing contaminants. Patient stated that he was having increased drainage from his cm line, which is suspicious for central line associated infection. Of note, however, is that his WBC is within normal limits.    At this time, we  recommend replacing the Cm central line. Continue vancomycin which will cover the GPCs and most GPRs.    RECOMMENDATION:  1. Lyme Titers  2. Remove Cm line. Recommend at  least 48 hour line holiday prior to replacement.   3. Continue current antibiotic regimen.  3. If patient becomes febrile, add on ceftriaxone for Lactobacillus coverage (often resistant to vancomycin but sensitive to beta lactams).    Thank you for this consult. ID will continue to follow.     Patient was discussed with Dr. Buckner.    Alfonso Bowling, MS4    Attestation:    I have seen and evaluated the patient with the medical student. I have edited this note (edits in italics) and agree with the above documented findings and plan. I have reviewed today's vital signs, medications, labs and imaging.    Mr. Acevedo is a 48 yo man who is TPN dependent and has a lengthy history of recurrent line infections. He presents with fevers and a positive blood culture, but interestingly only a single culture is positive to date and it is from a peripheral source. If his line site were pristine, I would suggest keeping the line while we await more information. However, given the intermittent tenderness around the line and ongoing drainage, I suspect that eventually it will need to be taken out regardless. Vancomycin is appropriate therapy for now.      Марина Buckner MD  Division of Infectious Diseases and International Medicine  Pager: [phone]       ________________________________________________________________    Consult Question:.  Admission Diagnosis: Gram-positive bacteremia [R78.81]         History of Present Illness:   Patient is a 48 yo M with a history of bariatric surgery on chronic TPN, recurrent CLABSI, short-gut syndrome, and a hx of MRSE bacteremia, strep bacteremia, and corynebacteria bacteremia who presents today with 2 days of Fever wit G+ rods found on blood culture.    Patient states that he has had over 20 episodes of bacteremia over the past 4-5 years. The most recent episode, for which he presented to the hospital this time, started on 8/27. Patient called the [facility] infusion pharmacy b/c of fever,  chills, and night-sweats that had occurred for over 2 days. He would check his temperature at home. And it would fluctuate between 100.x up to a Tmax of 101.9. After calling the [facility] infusion pharmacy, he went to have blood cultures drawn per their guidance. On  8/30, the blood cultures came back positive growing G+ rods. Patient was informed to come to the ED for continued management. Per the ED note the blood cultures were positive via peripheral draw (negative x2 from central line port).    Patient lives in [facility] in a home on a 3-acre property. He states he likes to walk in the woods on his property for leisure. Patient often notices ticks, but has not noticed any tick bites. Patient has multiple dogs at home. Patient lives with wife and kids. Nobody in his family has been sick. Patient states his reticular rash on his upper extremity is secondary to his time spent in the hot-tub.    Patient today endorses mild headache with resolved fevers, chills, night sweats, and diarrhea. Prior to hospitalization, patient stated he experienced fevers, chills, night sweats, headache, joint pain, sore throat, palpitations, diarrhea and nausea/vomiting, and abdominal pain that is chronic s/p surgery. Patient endorsed no chest pain, dyspnea, cough, dizziness, dysuria, or hematuria.    Patient also stated during re-examination that he has noted drainage from his Cm line with clots and granular material.     Summary of Recent Hospitalizations:  8/3/2020 = repeat dual-lumen Cm replaced.  7/28-8/3/20 - treated for MRSE bactermia secondary to central venous catheter; repeat dual-lumen Cm replaced  July 2020 - treated for Corynebacterium infection with Vancomycin  11/4/19 - peripheral cultures negative; but initial cultures grew Corneybacterium and Peptostreptococcus which were deemed contaminants.  10/4/19 - Strep, Staph, and Granulicatella adiacens bactermia; Cm replaced on 10/1  5/16/19 -  blood draw from PICC grew Rothia mucilaginosa  1/9/19 - Candidemia sepsis due to PICC line infection.           Review of Systems:   CONSTITUTIONAL:  No fevers or chills  EYES: negative for icterus  ENT:  negative for hearing loss, tinnitus and sore throat  RESPIRATORY:  negative for cough with sputum and dyspnea  CARDIOVASCULAR:  negative for chest pain, dyspnea  GASTROINTESTINAL:  negative for nausea, vomiting, diarrhea and constipation  GENITOURINARY:  negative for dysuria  HEME:  No easy bruising  INTEGUMENT:  No rashes  NEURO:  Negative for headache         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     Complication of anesthesia      Difficulty swallowing      Gastric ulcer, unspecified as acute or chronic, without mention of hemorrhage, perforation, or obstruction      Gastro-oesophageal reflux disease      Head injury      Hiatal hernia      Other bladder disorder      Other chronic pain      PONV (postoperative nausea and vomiting)      Severe malnutrition (H)     TPN     Short gut syndrome      Tobacco abuse             Past Surgical History:     Past Surgical History:   Procedure Laterality Date     AMPUTATION       APPENDECTOMY       BACK SURGERY  11/3/2014    curve in the spine     BIOPSY LYMPH NODE CERVICAL N/A 2/20/2015    Procedure: BIOPSY LYMPH NODE CERVICAL;  Surgeon: Baron Scanlon MD;  Location: [facility]     C GASTRIC BYPASS,OBESE<100CM SHAYLEE-EN-Y  2002    lost 300 pounds     CHOLECYSTECTOMY       DISCECTOMY, FUSION CERVICAL ANTERIOR ONE LEVEL, COMBINED N/A 2/15/2017    Procedure: COMBINED DISCECTOMY, FUSION CERVICAL ANTERIOR ONE LEVEL;  Surgeon: Darren Campos MD;  [facility]     ENDOSCOPIC  INSERTION TUBE GASTROSTOMY  9/9/2013    Procedure: ENDOSCOPIC INSERTION TUBE GASTROSTOMY;;  Surgeon: Blu Vyas MD;  [facility]     ENDOSCOPIC ULTRASOUND UPPER GASTROINTESTINAL TRACT (GI)  4/29/2011    Procedure:ENDOSCOPIC ULTRASOUND UPPER GASTROINTESTINAL TRACT (GI); Both Procedures done Conjointly; Surgeon:NEREIDA HOUSER; [facility]     ENDOSCOPIC ULTRASOUND UPPER GASTROINTESTINAL TRACT (GI)  9/9/2013    Procedure: ENDOSCOPIC ULTRASOUND UPPER GASTROINTESTINAL TRACT (GI);  Endoscopic Ultrasound Guide Gastrostomy Tube Placement  C-arm;  Surgeon: Noe Lizarraga MD;  [facility]     ENDOSCOPY  03/25/11    EGD, [facility]     ENDOSCOPY  08/04/09    Upper Endoscopy, [facility]     ENDOSCOPY  01/05/09    Upper Endoscopy, [facility]     ESOPHAGOSCOPY, GASTROSCOPY, DUODENOSCOPY (EGD), COMBINED  4/20/2011    Procedure:COMBINED ESOPHAGOSCOPY, GASTROSCOPY, DUODENOSCOPY (EGD); Surgeon:BLU VYAS; [facility]     ESOPHAGOSCOPY, GASTROSCOPY, DUODENOSCOPY (EGD), COMBINED  6/15/2011    Procedure:COMBINED ESOPHAGOSCOPY, GASTROSCOPY, DUODENOSCOPY (EGD); Surgeon:BLU VYAS; [facility]     ESOPHAGOSCOPY, GASTROSCOPY, DUODENOSCOPY (EGD), COMBINED  6/12/2013    Procedure: COMBINED ESOPHAGOSCOPY, GASTROSCOPY, DUODENOSCOPY (EGD);;  Surgeon: Blu Vyas MD;  [facility]     ESOPHAGOSCOPY, GASTROSCOPY, DUODENOSCOPY (EGD), COMBINED  11/22/2013    Procedure: COMBINED ESOPHAGOSCOPY, GASTROSCOPY, DUODENOSCOPY (EGD);;  Surgeon: Blu Vyas MD;  [facility]     ESOPHAGOSCOPY, GASTROSCOPY, DUODENOSCOPY (EGD), COMBINED  4/30/2014    Procedure: COMBINED ESOPHAGOSCOPY, GASTROSCOPY, DUODENOSCOPY (EGD);  Surgeon: Blu Vyas MD;  [facility]     ESOPHAGOSCOPY, GASTROSCOPY, DUODENOSCOPY (EGD), COMBINED N/A 2/20/2015    Procedure: COMBINED ESOPHAGOSCOPY, GASTROSCOPY, DUODENOSCOPY (EGD), BIOPSY SINGLE OR MULTIPLE;  Surgeon:  Baron Scanlon MD;  [facility]     ESOPHAGOSCOPY, GASTROSCOPY, DUODENOSCOPY (EGD), COMBINED N/A 9/30/2015    Procedure: COMBINED ESOPHAGOSCOPY, GASTROSCOPY, DUODENOSCOPY (EGD);  Surgeon: Francis Vyas MD;  Location:  GI     ESOPHAGOSCOPY, GASTROSCOPY, DUODENOSCOPY (EGD), COMBINED N/A 10/3/2019    Procedure: Upper Endoscopy;  Surgeon: Clif Morrow MD;  [facility]     GASTRECTOMY  6/22/2012    Procedure: GASTRECTOMY;  Open Approach, Excise Ulcers,Partial Gastrectomy, Esophagojejunostomy, Hiatal Hernia Repair, Extensive Lysis of Adhesions and Esaphagogastrodudenoscopy.;  Surgeon: Francis Vyas MD;  [facility]     GASTROJEJUNOSTOMY  08/26/09    Extensice enterolysis, partial resect. jejunum, part. resect gastric pouch, gastrojejunostomy anastomosis     HC ESOPH/GAS REFLUX TEST W NASAL IMPED ELECTRODE  8/5/2013    Procedure: ESOPHAGEAL IMPEDENCE FUNCTION TEST 1 HOUR OR LESS;  Surgeon: Halie Lang MD;  Location:  GI     HEAD & NECK SURGERY  2/15/2017    C5-C6     HERNIA REPAIR  2006    Umbilical hernia     HERNIORRHAPHY HIATAL  6/22/2012    Procedure: HERNIORRHAPHY HIATAL;;  Surgeon: Francis Vyas MD;  Location:  OR     HERNIORRHAPHY INGUINAL  11/22/2013    Procedure: HERNIORRHAPHY INGUINAL;;  Surgeon: Francis Vyas MD;  [facility]     INSERT PICC LINE Right 12/19/2019    Procedure: Picc Placement;  Surgeon: Per Dumont PA-C;  [facility]     INSERT PICC LINE Right 2/21/2020    Procedure: INSERTION, PICC;  Surgeon: Per Dumont PA-C;  [facility]     INSERT PORT VASCULAR ACCESS Right 12/19/2017    Procedure: INSERT PORT VASCULAR ACCESS;  Right Chest Port Placement ;  Surgeon: Lisandro Alejandro PA-C;  [facility]     INSERT PORT VASCULAR ACCESS Right 8/2/2018    Procedure: INSERT PORT VASCULAR ACCESS;  Place single lumen tunneled central venous access catheter;  Surgeon: Guy Jamil PA-C;  [facility]     IR CVC TUNNEL PLACEMENT > 5 YRS OF AGE  8/7/2019     IR CVC TUNNEL PLACEMENT > 5 YRS OF AGE  4/14/2020     IR CVC TUNNEL PLACEMENT > 5 YRS OF AGE  8/3/2020     IR CVC TUNNEL REMOVAL RIGHT  10/1/2019     IR CVC TUNNEL REMOVAL RIGHT  7/30/2020     IR FOLLOW UP VISIT OUTPATIENT  8/7/2019     IR PICC PLACEMENT > 5 YRS OF AGE  3/7/2019     IR PICC PLACEMENT > 5 YRS OF AGE  12/19/2019     IR PICC PLACEMENT > 5 YRS OF AGE  2/21/2020     LAPAROTOMY EXPLORATORY  11/22/2013    Procedure: LAPAROTOMY EXPLORATORY;  Exploratory Laparotomy, Upper Endoscopy, Left Inguinal Hernia Repair;  Surgeon: Francis Vyas MD;  [facility]     ORTHOPEDIC SURGERY       PICC INSERTION Right 03/16/2017    5fr DL BioFlo PICC, 42cm (3cm external) in the R medial brachial vein w/ tip in the SVC RA junction.     PICC INSERTION Left 09/23/2017    5fr DL BioFlo PICC, 45cm (1cm external) in the L basilic vein w/ tip in the SVC RA junction.     PICC INSERTION Right 05/16/2019    5Fr - 43cm, Medial brachial vein, low SVC     PICC INSERTION Right 10/02/2019    5Fr - 43cm (2cm external), basilic vein, low SVC     SHAYLEE EN Y BOWEL  2003     SOFT TISSUE SURGERY       THORACIC SURGERY       TONSILLECTOMY       TRANSESOPHAGEAL ECHOCARDIOGRAM INTRAOPERATIVE N/A 1/8/2019    Procedure: TRANSESOPHAGEAL ECHOCARDIOGRAM INTRAOPERATIVE;  Surgeon: GENERIC ANESTHESIA PROVIDER;  [facility]            Family History:   Reviewed and non-contributory.   Family History   Problem Relation Age of Onset     Gastrointestinal Disease Mother         Crohns disease     Anxiety Disorder Mother      Thyroid Disease Mother         Grave's disease     Cancer Father         ear cancer-skin cancer/melanoma     Breast Cancer Maternal Grandmother      Macular Degeneration Maternal Grandfather      Anxiety Disorder Sister      Diabetes Maternal Uncle      Breast Cancer Other      Hypertension No family hx of      Hyperlipidemia No family hx of      Cerebrovascular  Disease No family hx of      Prostate Cancer No family hx of      Depression No family hx of      Anesthesia Reaction No family hx of      Asthma No family hx of      Osteoporosis No family hx of      Genetic Disorder No family hx of      Obesity No family hx of      Mental Illness No family hx of      Substance Abuse No family hx of      Glaucoma No family hx of             Social History:     Social History     Tobacco Use     Smoking status: Current Some Day Smoker     Packs/day: 0.25     Years: 3.00     Pack years: 0.75     Types: Cigarettes     Smokeless tobacco: Never Used   Substance Use Topics     Alcohol use: No     Frequency: Never     Drinks per session: Patient refused     Binge frequency: Never     Comment: quit 2002     History   Sexual Activity     Sexual activity: Yes     Partners: Female     Birth control/ protection: None     Comment: no protection            Current Medications:       amphetamine-dextroamphetamine  20 mg Oral Daily     carvedilol  6.25 mg Oral BID w/meals     [START ON 9/7/2020] cyanocobalamin  1,000 mcg Intramuscular Q30 Days     diphenhydrAMINE  25 mg Oral Q8H     fentaNYL  25 mcg Transdermal Q48H     fentaNYL   Transdermal Q8H     lidocaine viscous 2% 15 mL and maalox/mylanta w/ simethicone 15 mL (GI COCKTAIL)  30 mL Oral At Bedtime     pantoprazole  40 mg Oral Daily     sodium chloride (PF)  3 mL Intracatheter Q8H     vancomycin (VANCOCIN) IV  1,250 mg Intravenous Q8H     [START ON 9/6/2020] vitamin D2  50,000 Units Oral Weekly            Allergies:     Allergies   Allergen Reactions     Bactrim [Sulfamethoxazole W/Trimethoprim] Rash     Penicillins Anaphylaxis     Please see Antimicrobial Management Team allergy assessment note 10/10/2018. Patient reported tolerating amoxicillin.     Doxycycline Rash     Vancomycin Rash     Rash after receiving vancomycin 3/28/16 (red man's?). Tolerated with slower infusion and diphenhydramine premed.            Physical Exam:   Vitals were  "reviewed  Patient Vitals for the past 24 hrs:   BP Temp Temp src Pulse Resp SpO2 Height Weight   09/01/20 0521 110/62 98.1  F (36.7  C) Oral 64 16 95 % -- --   09/01/20 0150 -- -- -- -- -- -- -- 91.8 kg (202 lb 6.1 oz)   09/01/20 0014 120/79 97.6  F (36.4  C) Oral 68 16 96 % -- --   08/31/20 2330 -- -- -- -- -- 98 % -- --   08/31/20 2315 122/84 -- -- -- -- 97 % -- --   08/31/20 2310 -- -- -- 68 -- 100 % -- --   08/31/20 2250 -- -- -- -- -- 100 % -- --   08/31/20 2245 -- -- -- -- -- 100 % -- --   08/31/20 2240 -- -- -- -- -- 99 % -- --   08/31/20 2135 127/80 -- -- 74 -- 99 % -- --   08/31/20 2012 -- -- -- -- -- 99 % -- --   08/31/20 1815 (!) 130/97 -- -- 76 -- 100 % -- --   08/31/20 1752 (!) 145/98 -- -- 74 -- 100 % -- --   08/31/20 1619 137/84 99.8  F (37.7  C) Oral 99 16 99 % 1.803 m (5' 11\") 87.1 kg (192 lb)       Physical Examination:  GENERAL:  well-developed, well-nourished, in bed in no acute distress.   HEENT:  Head is normocephalic, atraumatic   LUNGS:  Clear to auscultation bilateral.   CARDIOVASCULAR:  Regular rate and rhythm with no murmurs, gallops or rubs.  ABDOMEN:  soft, nontender. No appreciable hepatosplenomegaly  SKIN:  Bilateral UE reticulolinear rash; symmetric. Pinpoint petechiae on medial RUE. Dressing placed over site where his kassandra tube has been removed. Erythema around kassandra tube sight. Cm port site without erythema. No stigmata of endocarditis.  NEUROLOGIC: alert, oriented. Sensation intact in lower extremities  Psych: not in acute distress, normal mentation and affect.  Nail: no splinter hemorrhages.         Laboratory Data:     Inflammatory Markers    Recent Labs   Lab Test 07/28/20  1607 05/05/20  1218 04/28/20  1245 11/02/19  1853 10/30/19  1330 10/29/19  1210 07/10/19  1215 06/28/19  1345 05/14/19  1145  01/23/18  0845 01/20/18  1030  09/24/17  1900  06/28/17  2222 03/12/17  0351  11/01/16  1530   SED 10  --   --   --   --   --   --   --  13  --  10 11  --  31*  --  26* 17*  -- "  27*   CRP <2.9 <2.9 <2.9 <2.9 <2.9 <2.9 <2.9 <2.9  --    < > <2.9 5.4   < > 26.6*   < > 53.0* 3.4   < > 8.4*    < > = values in this interval not displayed.       Hematology Studies    Recent Labs   Lab Test 09/01/20  0550 08/31/20  1822 08/17/20  0430 08/11/20  1140 07/28/20  1607 07/14/20  1120  06/16/20  1410  03/10/20  1245  11/02/19  1853   WBC 7.6 9.2 6.1 5.0 6.8 5.9   < > 6.5   < > 5.7   < > 10.4   ANEU 4.0 6.1 3.3 2.3 4.0 3.0   < > 3.7   < > 3.1   < > 6.2   AEOS 0.2 0.1  --   --  0.1  --   --  0.2  --  0.2  --  0.1   HGB 11.3* 11.9* 10.9* 10.3* 12.9* 11.0*   < > 10.6*   < > 10.8*   < > 13.3   MCV 92 91 91 90 92 93   < > 95   < > 96   < > 96    220 157 148* 189 190   < > 127*   < > 170   < > 179    < > = values in this interval not displayed.       Metabolic Studies     Recent Labs   Lab Test 08/31/20  1822 08/17/20 0430 08/11/20  1140 08/03/20  0600 08/02/20  0516 08/01/20  0642    142 145* 143  --  140   POTASSIUM 4.0 3.6 3.1* 3.4  --  3.7   CHLORIDE 112* 108 110* 110*  --  106   CO2 26 28 29 28  --  30   BUN 13 12 8 12  --  16   CR 0.63* 0.74 0.89 0.91 0.89 0.86   GFRESTIMATED >90 >90 >90 >90 >90 >90       Hepatic Studies    Recent Labs   Lab Test 08/31/20  1822 08/17/20  0430 08/11/20  1140 08/03/20  0600 07/31/20  0642 07/30/20  0430   BILITOTAL 0.3 0.2 0.3 0.4 0.6 0.5   ALKPHOS 69 65 67 66 77 68   ALBUMIN 3.2* 3.1* 3.5 3.2* 3.5 3.2*   AST 12 12 15 17 11 13   ALT 25 16 17 20 24 24       Microbiology:  Culture Micro   Date Value Ref Range Status   09/01/2020 PENDING  Preliminary   09/01/2020 No growth after 1 hour  Preliminary   08/31/2020 No growth after 10 hours  Preliminary   08/31/2020 No growth after 10 hours  Preliminary   08/29/2020 (A)  Preliminary    Cultured on the 1st day of incubation:  Gram positive rods     08/29/2020   Preliminary    Critical Value/Significant Value, preliminary result only, called to and read back by  Dr. Sara Warren 2038 8/30/20 AM     08/29/2020    Preliminary    (Note)  NEGATIVE for the following: Staphylococcus spp., Staph aureus, Staph  epidermidis, Staph lugdunensis, Streptococcus spp., Strep pneumoniae,  Strep pyogenes, Strep agalactiae, Strep anginosus group, Enterococcus  faecalis, Enterococcus faecium, and Listeria spp. by [employer] for Goodigene  multiplex nucleic acid test. Final identification and antimicrobial  susceptibility testing will be verified by standard methods.    Specimen tested with Verigene multiplex, gram-positive blood culture  nucleic acid test for the following targets: Staph aureus, Staph  epidermidis, Staph lugdunensis, other Staph species, Enterococcus  faecalis, Enterococcus faecium, Streptococcus species, S. agalactiae,  S. anginosus grp., S. pneumoniae, S. pyogenes, Listeria sp., mecA  (methicillin resistance) and Melvin/B (vancomycin resistance).    Critical Value/Significant Value called to and read back by  Dr. Sara Warren 2038 8/30/20 AM       08/28/2020 No growth after 4 days  Preliminary   08/28/2020 No growth after 4 days  Preliminary   07/30/2020 (A)  Final    <15 colonies   Staphylococcus epidermidis  Susceptibility testing not routinely done     07/29/2020 No growth  Final   07/29/2020 No growth  Final   07/28/2020 No growth  Final   07/28/2020 (A)  Final    Cultured on the 2nd day of incubation:  Gram positive cocci in clusters  Upon further review, there is not any gram positive cocci in clusters present in this   blood culture.     07/28/2020 (A)  Final    Cultured on the 2nd day of incubation:  Corynebacterium species  Identification obtained by MALDI-TOF mass spectrometry research use only database. Test   characteristics determined and verified by the Infectious Diseases Diagnostic Laboratory   ([facility]) [place], [place].     07/28/2020   Final    Critical Value/Significant Value, preliminary result only, called to and read back by  Richie Nugent RN [id number] 7/30/20 AM     07/28/2020   Final    Updated report called to and read back  by  Spring Lugo RN at 1300 on 8.2.20 [place]     07/28/2020   Final    (Note)  NEGATIVE for the following: Staphylococcus spp., Staph aureus, Staph  epidermidis, Staph lugdunensis, Streptococcus spp., Strep pneumoniae,  Strep pyogenes, Strep agalactiae, Strep anginosus group, Enterococcus  faecalis, Enterococcus faecium, and Listeria spp. by Verigene  multiplex nucleic acid test. Final identification and antimicrobial  susceptibility testing will be verified by standard methods.    Critical Value/Significant Value called to and read back by LIZET BAUGH RN [id number] [id number] 07.31.20      07/26/2020 (A)  Final    Cultured on the 1st day of incubation:  Staphylococcus epidermidis  Susceptibility testing done on previous specimen     07/26/2020   Final    Critical Value/Significant Value, preliminary result only, called to and read back by  Neha Quintana Pharmacist [facility] 7.27.20 1735. BRANDON     07/26/2020 (A)  Final    Cultured on the 1st day of incubation:  Strain 2  Staphylococcus epidermidis  Susceptibility testing done on previous specimen     07/26/2020 (A)  Final    Cultured on the 1st day of incubation:  Staphylococcus epidermidis     07/26/2020   Final    Critical Value/Significant Value, preliminary result only, called to and read back by  Yaa Jarquin RN [facility] 7.27.20 1616. BRANDON     07/26/2020 (A)  Final    Cultured on the 1st day of incubation:  Strain 2  Staphylococcus epidermidis     07/26/2020   Final    (Note)  POSITIVE for STAPHYLOCOCCUS EPIDERMIDIS and POSITIVE for the mecA  gene (resistant to methicillin) by Verigene multiplex nucleic acid  test. Final identification and antimicrobial susceptibility testing  will be verified by standard methods.    Specimen tested with Verigene multiplex, gram-positive blood culture  nucleic acid test for the following targets: Staph aureus, Staph  epidermidis, Staph lugdunensis, other Staph species, Enterococcus  faecalis, Enterococcus faecium, Streptococcus species, S.  agalactiae,  S. anginosus grp., S. pneumoniae, S. pyogenes, Listeria sp., mecA  (methicillin resistance) and Melvin/B (vancomycin resistance).    Critical Value/Significant Value called to and read back by Chantale Jarrett RN. @2003. 7.27.20. BS.        04/03/2020 No growth  Final   04/03/2020 No growth  Final   11/02/2019 No growth  Final   11/02/2019 No growth  Final   10/30/2019 No growth  Final   10/30/2019 (A)  Final    Cultured on the 3rd day of incubation:  Corynebacterium species  Identification obtained by MALDI-TOF mass spectrometry research use only database. Test   characteristics determined and verified by the Infect[facility] Diseases Diagnostic Laboratory   ([facility]) [place], [place].     10/30/2019   Final    Critical Value/Significant Value, preliminary result only, called to and read back by   DR VYAS @ 1745. 11/1/2019 AV     10/30/2019 (A)  Final    Cultured on the 4th day of incubation:  Finegoldia magna (Peptostreptococcus otto)     10/30/2019   Final    Critical Value/Significant Value, preliminary result only, called to and read back by  DANI SANCHES RN 0200 11.3.19 NDP     10/30/2019   Final    (Note)  NEGATIVE for the following: Staphylococcus spp., Staph aureus, Staph  epidermidis, Staph lugdunensis, Streptococcus spp., Strep pneumoniae,  Strep pyogenes, Strep agalactiae, Strep anginosus group, Enterococcus  faecalis, Enterococcus faecium, and Listeria spp. by [employer]  multiplex nucleic acid test. Final identification and antimicrobial  susceptibility testing will be verified by standard methods.    Critical Value/Significant Value called to and read back by  Francis Vyas MD @ 1745. 11/1/19. AV.           NEGATIVE for the following: Staphylococcus spp., Staph aureus, Staph  epidermidis, Staph lugdunensis, Streptococcus spp., Strep pneumoniae,  Strep pyogenes, Strep agalactiae, Strep anginosus group, Enterococcus  faecalis, Enterococcus faecium, and Listeria spp. by [employer]  multiplex nucleic acid  test. Final identification and antimicrobial  susceptibility testing will be verified by standard methods.    Critical Value/Significant Value called to and read back by DANI SANCHES RN 0503 11.3.19 NDP     10/29/2019 No growth  Final   10/17/2019 No growth  Final   10/17/2019 No growth  Final   10/01/2019 No growth  Final   10/01/2019 No growth  Final   09/30/2019 No growth  Final   09/29/2019 No growth  Final   09/29/2019 No growth  Final   09/27/2019 (A)  Final    Cultured on the 1st day of incubation:  Granulicatella adiacens     09/27/2019   Final    Critical Value/Significant Value, preliminary result only, called to and read back by  Dr Vyas on 9.28.19 at 1632 bw     09/27/2019 (A)  Final    Cultured on the 1st day of incubation:  Staphylococcus epidermidis     09/27/2019   Final    Critical Value/Significant Value, preliminary result only, called to and read back by  paged to [facility] on 9.28.19 at 2327 bw     09/27/2019 (A)  Final    Cultured on the 1st day of incubation:  Streptococcus salivarius group     09/27/2019   Final    (Note)  POSITIVE for STREPTOCOCCUS SPECIES OTHER THAN pneumococcus, anginosus  group, S. pyogenes and S. agalactiae. Performed using [employer]  multiplex nucleic acid test. Final identification and antimicrobial  susceptibility testing will be verified by standard methods.    POSITIVE for STAPHYLOCOCCUS EPIDERMIDIS and POSITIVE for the mecA  gene (resistant to methicillin) by [employer] multiplex nucleic acid  test. Final identification and antimicrobial susceptibility testing  will be verified by standard methods.    Specimen tested with [employer] multiplex, gram-positive blood culture  nucleic acid test for the following targets: Staph aureus, Staph  epidermidis, Staph lugdunensis, other Staph species, Enterococcus  faecalis, Enterococcus faecium, Streptococcus species, S. agalactiae,  S. anginosus grp., S. pneumoniae, S. pyogenes, Listeria sp., mecA  (methicillin resistance)  and Melvin/B (vancomycin resistance).    Critical Value/Significant Value called to and read back by Estelle Hampton RN 9.27.19 @ 0227 AV     09/27/2019 No growth  Final   07/10/2019 No growth  Final   07/10/2019 No growth  Final   05/24/2019 No growth  Final   05/24/2019 No growth  Final   05/14/2019 No growth  Final   05/14/2019 No growth  Final   05/14/2019 No growth  Final   05/12/2019 No growth  Final   05/12/2019 No growth  Final   05/10/2019 (A)  Final    Cultured on the 2nd day of incubation:  Rothia mucilaginosa     05/10/2019   Final    Critical Value/Significant Value, preliminary result only, called to and read back by  Dr Clif Morrow at 10:20am 5/12/2019 ()     05/10/2019   Final    (Note)  NEGATIVE for the following: Staphylococcus spp., Staph aureus, Staph  epidermidis, Staph lugdunensis, Streptococcus spp., Strep pneumoniae,  Strep pyogenes, Strep agalactiae, Strep anginosus group, Enterococcus  faecalis, Enterococcus faecium, and Listeria spp. by [employer]  multiplex nucleic acid test. Final identification and antimicrobial  susceptibility testing will be verified by standard methods.    Critical Value/Significant Value called to and read back by Dr Clif Morrow at 13:15pm 5/12/2019 ()     01/25/2019 No growth  Final   01/25/2019 No growth  Final   01/07/2019 No growth  Final   01/07/2019 No growth  Final   01/06/2019 No growth  Final   01/06/2019 No growth  Final   01/04/2019 Single colony  Candida albicans   (A)  Final   01/04/2019   Final    <10,000 colonies/mL  urogenital deepa  Susceptibility testing not routinely done     01/04/2019 (A)  Final    Cultured on the 1st day of incubation:  Candida albicans     01/04/2019   Final    Critical Value/Significant Value, preliminary result only, called to and read back by   MARILOU HURST RN @1452 1/4/19. CT     01/04/2019 Susceptibility testing done on previous specimen  Final   01/04/2019 (A)  Final    Cultured on the 2nd day of incubation:  Candida  albicans     01/04/2019   Final    Critical Value/Significant Value, preliminary result only, called to and read back by  Valerie Su RN on 5b at 1047 on 1/5/19 ac.     01/04/2019 Susceptibility testing done on previous specimen  Final   01/02/2019 (A)  Final    Cultured on the 3rd day of incubation:  Candida albicans     01/02/2019   Final    Critical Value/Significant Value, preliminary result only, called to and read back by  Perla Kim RN on 1.4.19 at 1649. bw     01/02/2019 Susceptibility testing done on previous specimen  Final   01/02/2019 (A)  Final    Cultured on the 1st day of incubation:  Candida albicans     01/02/2019   Final    Critical Value/Significant Value, preliminary result only, called to and read back by  DAVID CRUZ, RN 2231 1.3.19 NDP     12/24/2018 No growth  Final   12/24/2018 No growth  Final   11/08/2018 No growth  Final   11/08/2018 No growth  Final   10/20/2018 No growth  Final   10/20/2018 No growth  Final   10/13/2018 (A)  Final    Cultured on the 4th day of incubation:  Achromobacter (Alcaligenes) denitrificans     10/13/2018   Final    Critical Value/Significant Value, preliminary result only, called to and read back by  Dr. Chantale Mendez @2148 on 10/18/18. .     10/13/2018 Susceptibility testing done on previous specimen  Final   10/13/2018 (A)  Final    Cultured on the 2nd day of incubation:  Achromobacter (Alcaligenes) denitrificans     10/13/2018   Final    Critical Value/Significant Value, preliminary result only, called to and read back by  Rigo Dickerson MD at 0848 10.16.18.DK     10/13/2018   Final    (Note)  NEGATIVE for the following organisms and resistance markers:  Acinetobacter sp., Citrobacter sp., Enterobacter sp., Proteus sp., E.  coli, K. pneumoniae/oxytoca, P. aeruginosa, CTX-M, KPC, NDM, VIM, IMP  and OXA by [email or web address] multiplex nucleic acid test. Final identification  and antimicrobial susceptibility testing will be verified by  standard  methods.    Critical Value/Significant Value called to and read back by ;Dr. Rigo Dickerson 3491 @1117   10/16/2018 gd       10/13/2018 No growth  Final   10/13/2018 No growth  Final   10/13/2018 No growth  Final   10/12/2018 (A)  Final    Cultured on the 1st day of incubation:  Achromobacter (Alcaligenes) denitrificans  Susceptibility testing done on previous specimen     10/12/2018   Final    Critical Value/Significant Value, preliminary result only, called to and read back by  Jael Lanza at 0829 on 10/13/18 ac.     10/12/2018 No growth  Final   10/12/2018 No growth  Final       Urine Studies    Recent Labs   Lab Test 07/28/20  1905 11/03/19  0025 10/04/19  1344 09/29/19  1625 05/24/19  2316   LEUKEST Negative Negative Trace* Negative Negative   WBCU <1 <1 1 0 1       Vancomycin Levels    Recent Labs   Lab Test 08/02/20  1537 07/30/20  0430 10/07/19  1952   VANCOMYCIN 15.8 9.7 9.1       Hepatitis B Testing   Recent Labs   Lab Test 09/20/17  0549   HEPBANG Nonreactive     Hepatitis C Testing     Hepatitis C Antibody   Date Value Ref Range Status   09/20/2017 Nonreactive NR^Nonreactive Final     Comment:     Assay performance characteristics have not been established for newborns,   infants, and children       Respiratory Virus Testing    No results found for: RS, FLUAG

## 2020-09-01 NOTE — PROVIDER NOTIFICATION
"[facility]: \"Parker Acevedo Rm 228: Pt arrived, please come assess. Please call back in regards to pt concerns/questions\"      **JACQUI called this writer and addressed patients/concerns and questions. Pt was seen in ED by provider.   "

## 2020-09-02 ENCOUNTER — APPOINTMENT (OUTPATIENT)
Dept: INTERVENTIONAL RADIOLOGY/VASCULAR | Facility: CLINIC | Age: 50
DRG: 315 | End: 2020-09-02
Attending: PHYSICIAN ASSISTANT
Payer: COMMERCIAL

## 2020-09-02 ENCOUNTER — APPOINTMENT (OUTPATIENT)
Dept: CARDIOLOGY | Facility: CLINIC | Age: 50
DRG: 315 | End: 2020-09-02
Payer: COMMERCIAL

## 2020-09-02 LAB
ANION GAP SERPL CALCULATED.3IONS-SCNC: 4 MMOL/L (ref 3–14)
B BURGDOR IGG+IGM SER QL: 0.06 (ref 0–0.89)
BACTERIA SPEC CULT: NO GROWTH
BUN SERPL-MCNC: 11 MG/DL (ref 7–30)
CALCIUM SERPL-MCNC: 8.4 MG/DL (ref 8.5–10.1)
CHLORIDE SERPL-SCNC: 106 MMOL/L (ref 94–109)
CO2 SERPL-SCNC: 29 MMOL/L (ref 20–32)
CREAT SERPL-MCNC: 0.68 MG/DL (ref 0.66–1.25)
ERYTHROCYTE [DISTWIDTH] IN BLOOD BY AUTOMATED COUNT: 17 % (ref 10–15)
GFR SERPL CREATININE-BSD FRML MDRD: >90 ML/MIN/{1.73_M2}
GLUCOSE SERPL-MCNC: 97 MG/DL (ref 70–99)
HCT VFR BLD AUTO: 37.9 % (ref 40–53)
HGB BLD-MCNC: 11.9 G/DL (ref 13.3–17.7)
MCH RBC QN AUTO: 28.8 PG (ref 26.5–33)
MCHC RBC AUTO-ENTMCNC: 31.4 G/DL (ref 31.5–36.5)
MCV RBC AUTO: 92 FL (ref 78–100)
PLATELET # BLD AUTO: 194 10E9/L (ref 150–450)
POTASSIUM SERPL-SCNC: 3.7 MMOL/L (ref 3.4–5.3)
RBC # BLD AUTO: 4.13 10E12/L (ref 4.4–5.9)
SARS-COV-2 RNA SPEC QL NAA+PROBE: NOT DETECTED
SODIUM SERPL-SCNC: 139 MMOL/L (ref 133–144)
SPECIMEN SOURCE: NORMAL
SPECIMEN SOURCE: NORMAL
VANCOMYCIN SERPL-MCNC: 24.3 MG/L
WBC # BLD AUTO: 8.6 10E9/L (ref 4–11)

## 2020-09-02 PROCEDURE — 25000125 ZZHC RX 250: Performed by: STUDENT IN AN ORGANIZED HEALTH CARE EDUCATION/TRAINING PROGRAM

## 2020-09-02 PROCEDURE — 87040 BLOOD CULTURE FOR BACTERIA: CPT | Performed by: STUDENT IN AN ORGANIZED HEALTH CARE EDUCATION/TRAINING PROGRAM

## 2020-09-02 PROCEDURE — 36415 COLL VENOUS BLD VENIPUNCTURE: CPT | Performed by: INTERNAL MEDICINE

## 2020-09-02 PROCEDURE — 25000128 H RX IP 250 OP 636: Performed by: EMERGENCY MEDICINE

## 2020-09-02 PROCEDURE — 36589 REMOVAL TUNNELED CV CATH: CPT | Mod: RT

## 2020-09-02 PROCEDURE — 86618 LYME DISEASE ANTIBODY: CPT | Performed by: STUDENT IN AN ORGANIZED HEALTH CARE EDUCATION/TRAINING PROGRAM

## 2020-09-02 PROCEDURE — 25800030 ZZH RX IP 258 OP 636: Performed by: EMERGENCY MEDICINE

## 2020-09-02 PROCEDURE — 25000132 ZZH RX MED GY IP 250 OP 250 PS 637: Performed by: STUDENT IN AN ORGANIZED HEALTH CARE EDUCATION/TRAINING PROGRAM

## 2020-09-02 PROCEDURE — 25000128 H RX IP 250 OP 636: Performed by: INTERNAL MEDICINE

## 2020-09-02 PROCEDURE — 93306 TTE W/DOPPLER COMPLETE: CPT

## 2020-09-02 PROCEDURE — 40000989 ZZH STATISTIC CHRONIC PULMONARY DISEASE SPECIALIST

## 2020-09-02 PROCEDURE — 99233 SBSQ HOSP IP/OBS HIGH 50: CPT | Mod: GC | Performed by: INTERNAL MEDICINE

## 2020-09-02 PROCEDURE — 12000001 ZZH R&B MED SURG/OB UMMC

## 2020-09-02 PROCEDURE — 25000125 ZZHC RX 250: Performed by: RADIOLOGY

## 2020-09-02 PROCEDURE — 80048 BASIC METABOLIC PNL TOTAL CA: CPT | Performed by: STUDENT IN AN ORGANIZED HEALTH CARE EDUCATION/TRAINING PROGRAM

## 2020-09-02 PROCEDURE — 0JPTXXZ REMOVAL OF TUNNELED VASCULAR ACCESS DEVICE FROM TRUNK SUBCUTANEOUS TISSUE AND FASCIA, EXTERNAL APPROACH: ICD-10-PCS | Performed by: PHYSICIAN ASSISTANT

## 2020-09-02 PROCEDURE — 85027 COMPLETE CBC AUTOMATED: CPT | Performed by: STUDENT IN AN ORGANIZED HEALTH CARE EDUCATION/TRAINING PROGRAM

## 2020-09-02 PROCEDURE — 36592 COLLECT BLOOD FROM PICC: CPT | Performed by: STUDENT IN AN ORGANIZED HEALTH CARE EDUCATION/TRAINING PROGRAM

## 2020-09-02 PROCEDURE — 99407 BEHAV CHNG SMOKING > 10 MIN: CPT

## 2020-09-02 PROCEDURE — 80202 ASSAY OF VANCOMYCIN: CPT | Performed by: INTERNAL MEDICINE

## 2020-09-02 PROCEDURE — 25800030 ZZH RX IP 258 OP 636: Performed by: INTERNAL MEDICINE

## 2020-09-02 PROCEDURE — 87070 CULTURE OTHR SPECIMN AEROBIC: CPT | Performed by: STUDENT IN AN ORGANIZED HEALTH CARE EDUCATION/TRAINING PROGRAM

## 2020-09-02 PROCEDURE — 36415 COLL VENOUS BLD VENIPUNCTURE: CPT | Performed by: STUDENT IN AN ORGANIZED HEALTH CARE EDUCATION/TRAINING PROGRAM

## 2020-09-02 PROCEDURE — 93306 TTE W/DOPPLER COMPLETE: CPT | Mod: 26 | Performed by: INTERNAL MEDICINE

## 2020-09-02 PROCEDURE — 40000556 ZZH STATISTIC PERIPHERAL IV START W US GUIDANCE

## 2020-09-02 PROCEDURE — 25000128 H RX IP 250 OP 636: Performed by: STUDENT IN AN ORGANIZED HEALTH CARE EDUCATION/TRAINING PROGRAM

## 2020-09-02 PROCEDURE — 02PYX3Z REMOVAL OF INFUSION DEVICE FROM GREAT VESSEL, EXTERNAL APPROACH: ICD-10-PCS | Performed by: PHYSICIAN ASSISTANT

## 2020-09-02 RX ORDER — LIDOCAINE HYDROCHLORIDE 10 MG/ML
1-30 INJECTION, SOLUTION EPIDURAL; INFILTRATION; INTRACAUDAL; PERINEURAL
Status: COMPLETED | OUTPATIENT
Start: 2020-09-02 | End: 2020-09-02

## 2020-09-02 RX ORDER — DEXTROSE MONOHYDRATE 25 G/50ML
25-50 INJECTION, SOLUTION INTRAVENOUS
Status: DISCONTINUED | OUTPATIENT
Start: 2020-09-02 | End: 2020-09-04 | Stop reason: HOSPADM

## 2020-09-02 RX ORDER — NICOTINE POLACRILEX 4 MG
15-30 LOZENGE BUCCAL
Status: DISCONTINUED | OUTPATIENT
Start: 2020-09-02 | End: 2020-09-04 | Stop reason: HOSPADM

## 2020-09-02 RX ORDER — NEOMYCIN/BACITRACIN/POLYMYXINB 3.5-400-5K
OINTMENT (GRAM) TOPICAL 4 TIMES DAILY PRN
Status: DISCONTINUED | OUTPATIENT
Start: 2020-09-02 | End: 2020-09-04 | Stop reason: HOSPADM

## 2020-09-02 RX ADMIN — OXYCODONE HYDROCHLORIDE 10 MG: 5 SOLUTION ORAL at 05:51

## 2020-09-02 RX ADMIN — LIDOCAINE HYDROCHLORIDE 15 ML: 20 SOLUTION ORAL; TOPICAL at 00:40

## 2020-09-02 RX ADMIN — VANCOMYCIN HYDROCHLORIDE 1250 MG: 10 INJECTION, POWDER, LYOPHILIZED, FOR SOLUTION INTRAVENOUS at 05:51

## 2020-09-02 RX ADMIN — CARVEDILOL 6.25 MG: 6.25 TABLET, FILM COATED ORAL at 08:04

## 2020-09-02 RX ADMIN — ONDANSETRON 8 MG: 4 TABLET, ORALLY DISINTEGRATING ORAL at 03:26

## 2020-09-02 RX ADMIN — DIPHENHYDRAMINE HYDROCHLORIDE 25 MG: 25 CAPSULE ORAL at 14:18

## 2020-09-02 RX ADMIN — NICOTINE 1 PATCH: 21 PATCH TRANSDERMAL at 08:04

## 2020-09-02 RX ADMIN — DEXTROAMPHETAMINE SACCHARATE, AMPHETAMINE ASPARTATE, DEXTROAMPHETAMINE SULFATE AND AMPHETAMINE SULFATE 20 MG: 2.5; 2.5; 2.5; 2.5 TABLET ORAL at 08:04

## 2020-09-02 RX ADMIN — LIDOCAINE HYDROCHLORIDE 8 ML: 10 INJECTION, SOLUTION EPIDURAL; INFILTRATION; INTRACAUDAL; PERINEURAL at 13:57

## 2020-09-02 RX ADMIN — LIDOCAINE HYDROCHLORIDE 30 ML: 20 SOLUTION ORAL; TOPICAL at 14:19

## 2020-09-02 RX ADMIN — PANTOPRAZOLE SODIUM 40 MG: 40 TABLET, DELAYED RELEASE ORAL at 08:04

## 2020-09-02 RX ADMIN — DIPHENHYDRAMINE HYDROCHLORIDE 25 MG: 25 CAPSULE ORAL at 05:51

## 2020-09-02 RX ADMIN — LIDOCAINE HYDROCHLORIDE 30 ML: 20 SOLUTION ORAL; TOPICAL at 21:52

## 2020-09-02 RX ADMIN — SUCRALFATE 1 G: 1 SUSPENSION ORAL at 03:36

## 2020-09-02 RX ADMIN — OXYCODONE HYDROCHLORIDE 10 MG: 5 SOLUTION ORAL at 21:52

## 2020-09-02 RX ADMIN — CARVEDILOL 6.25 MG: 6.25 TABLET, FILM COATED ORAL at 17:08

## 2020-09-02 RX ADMIN — VANCOMYCIN HYDROCHLORIDE 1750 MG: 10 INJECTION, POWDER, LYOPHILIZED, FOR SOLUTION INTRAVENOUS at 17:09

## 2020-09-02 RX ADMIN — LORAZEPAM 1 MG: 1 TABLET ORAL at 22:06

## 2020-09-02 RX ADMIN — OXYCODONE HYDROCHLORIDE 10 MG: 5 SOLUTION ORAL at 14:17

## 2020-09-02 RX ADMIN — LIDOCAINE HYDROCHLORIDE 15 ML: 20 SOLUTION ORAL; TOPICAL at 00:39

## 2020-09-02 RX ADMIN — DIPHENHYDRAMINE HYDROCHLORIDE 25 MG: 25 CAPSULE ORAL at 21:53

## 2020-09-02 ASSESSMENT — ACTIVITIES OF DAILY LIVING (ADL)
ADLS_ACUITY_SCORE: 9

## 2020-09-02 ASSESSMENT — PAIN DESCRIPTION - DESCRIPTORS: DESCRIPTORS: SORE

## 2020-09-02 NOTE — PLAN OF CARE
ASSUMED CARE: [id number]  NEURO: AO*4  ACTIVITY: Independent  PAIN: Pt denied pain  CARDIAC: WDL  RESPIRATORY: Breathing comfortably on RA  GI/: Pt is voiding adequately.   SKIN: dry, bruised and intact  LDA: Left PIV  DIET: NPO      CHANGES IN SHIFT: Pt denied nausea and states to feeling much better. Vancomycin was given during shift.Pt requested ativan, was given. No other acute events happened during shift.   POC: Continue to monitor and update MD with any changes.     Penny Alvarado RN on 9/2/2020 at 11:35 PM

## 2020-09-02 NOTE — PLAN OF CARE
"Blood pressure 109/70, pulse 78, temperature 98.6  F (37  C), temperature source Oral, resp. rate 18, height 1.803 m (5' 11\"), weight 91.8 kg (202 lb 6.1 oz), SpO2 98 %. RA .     Patient A & O X 4, VSS no respiratory distress noted. Patient had right azevedo removed at IR and Tip sent for culture  by IR RN. . Have LPIV  put in by IV team . C/o abdominal pain and received Oxycodone 10 mg po x 1 and has order for Vancomycin need to be given when Pharmacy send the dose. Patient remain NPO except meds. Patient void adequate . Continue with POC and update MD with change    "

## 2020-09-02 NOTE — PROCEDURES
[facility], [place]    Procedure: IR Procedure Note    Date/Time: 9/2/2020 1:57 PM  Performed by: Koko Enriquez PA-C  Authorized by: Koko Enriquez PA-C     UNIVERSAL PROTOCOL   Site Marked: NA  Prior Images Obtained and Reviewed:  Yes  Required items: Required blood products, implants, devices and special equipment available    Patient identity confirmed:  Verbally with patient, arm band, provided demographic data and hospital-assigned identification number  Patient was reevaluated immediately before administering moderate or deep sedation or anesthesia  Confirmation Checklist:  Patient's identity using two indicators, relevant allergies, procedure was appropriate and matched the consent or emergent situation and correct equipment/implants were available  Time out: Immediately prior to the procedure a time out was called    Universal Protocol: the Joint Commission Universal Protocol was followed    Preparation: Patient was prepped and draped in usual sterile fashion    ESBL (mL):  5         ANESTHESIA    Anesthesia: Local infiltration  Local Anesthetic:  Lidocaine 1% without epinephrine      SEDATION    Patient Sedated: No    See dictated procedure note for full details.  Findings: Existing right internal jugular, 9.5 Fr., dual lumen tunneled central venous catheter removed intact and without difficulty.    Specimens: other (see comment) (Catheter tip sent for culture.)    Complications: None    Condition: Stable    Plan: Follow up per primary team. Upright for 2 hours, no strenuous activity for 3 days.    PROCEDURE   Patient Tolerance:  Patient tolerated the procedure well with no immediate complications    Length of time physician/provider present for 1:1 monitoring during sedation: 0

## 2020-09-02 NOTE — PHARMACY-VANCOMYCIN DOSING SERVICE
Pharmacy Vancomycin Note  Date of Service 2020  Patient's  1970   49 year old, male    Indication: Bacteremia  Goal Trough Level: 15-20 mg/L  Day of Therapy: 3  Current Vancomycin regimen:  1250 mg IV q8h    Current estimated CrCl = Estimated Creatinine Clearance: 155.6 mL/min (based on SCr of 0.68 mg/dL).    Creatinine for last 3 days  2020:  6:22 PM Creatinine 0.63 mg/dL  2020:  5:59 AM Creatinine 0.68 mg/dL    Recent Vancomycin Levels (past 3 days)  2020: 10:49 AM Vancomycin Level 24.3 mg/L (5 hr tr)    Vancomycin IV Administrations (past 72 hours)                   vancomycin 1250 mg in 0.9% NaCl 250 mL intermittent infusion 1,250 mg (mg) 1,250 mg Given 20 0551     1,250 mg Given 20     1,250 mg Given  1208     1,250 mg Given  0511    vancomycin (VANCOCIN) 2,250 mg in sodium chloride 0.9 % 500 mL intermittent infusion (mg) 2,250 mg New Bag 20                Nephrotoxins and other renal medications (From now, onward)    Start     Dose/Rate Route Frequency Ordered Stop    20 0400  vancomycin 1250 mg in 0.9% NaCl 250 mL intermittent infusion 1,250 mg      1,250 mg  over 2.5 Hours Intravenous EVERY 8 HOURS 20 1831               Contrast Orders - past 72 hours (72h ago, onward)    None          Interpretation of levels and current regimen:  Trough level is supratherapeutic, however, dose was given 2 hours late, therefore a trough was drawn only 5 hours after administration. Extrapolated trough is closer to therapeutic     Has serum creatinine changed > 50% in last 72 hours: No    Urine output:  good urine output    Renal Function: Stable    Plan:  1.  Decrease Dose to 1750 mg q 12 hours   2.  Pharmacy will check trough levels as appropriate in 1-3 Days.    3. Serum creatinine levels will be ordered daily for the first week of therapy and at least twice weekly for subsequent weeks.      Fabiola Garcia, PharmD  PGY1 Pharmacy Resident           .

## 2020-09-02 NOTE — PLAN OF CARE
Shift: [id number]    Status: Pending cm catheter removal  Neuro: Alert and oriented x4, able to make needs known, calls appropriately  Cardiac/VS: VSS  Respiratory: RA  GI: intermittent nausea, abdominal pain. Received PRN zofran x1, PRN carafate x1, and PRN GI cocktail x1. Unable to finish LR bolus from previous shift d/t pt stating that the bolus and other IV fluids make his stomach/abdomen hurt  Diet/Appetite: NPO  : Voids spontaneously   Activity: Up independently   Pain: Reporting almost constant abdomen pain. Requesting oxycodone or GI cocktail every 2-3hours while awake, despite interventions not being available at times  Skin: PEG tube site leaking, neosporin ordered per MD and requested from pharmacy  LDA(s): Cm catheter - Purple lumen infusing at TKO with intermittent abx        Plan: Continue plan of care

## 2020-09-02 NOTE — PLAN OF CARE
ASSUMED CARE: [id number]  NEURO: AO*4  ACTIVITY: Independent  PAIN: Pt state pain around back and abdomen area, heat applied and oxycodone prn given  CARDIAC: WDL  RESPIRATORY: Breathing comfortably on RA  GI/: Pt is voiding adequately. Dressing applied over old G tube site  SKIN: dry, bruised and intact  LDA: Right IJ  DIET: NPO     CHANGES IN SHIFT: Pt states upset stomach and wanted GI cocktail and refused all cares unless it was given, paged MD and made the med a PRN. Pt also stated streak in blood with his emesis, notified MD and MD stated to monitor closely and gather the sputum if that occurs again. 1000 ml of LR bolus was started this shift. Pt felt a bit anxious and requested ativan, med was given.   POC: Pt will have Cm removed tomorrow, continue to monitor and update MD with any changes.     Penny Alvarado RN on 9/1/2020 at 11:59 PM

## 2020-09-02 NOTE — CONSULTS
Smoking Cessation Consult   2020    Patient: Praker Acevedo      :  1970                    MRN:[id number]      Gram-positive bacteremia [R78.81] @HX    History of Present Illness: Mr. Acevedo is a 48yo M with PMHx notable for s/p bariatric surgery on chronic TPN, recurrent CLABSI, gastric ulcer, short gut syndrome, here after approximately 2 days of fever and subsequently found to have a gram positive rods on blood culture after recent MRSE bacteremia 2/2 Cm CVC infection.      Reason for Consult: Patient identified as current everyday smoker per patient chart.     Asked patient if he would be interested in sharing about his tobacco use and in learning about more options and resources for quitting, staying quit, and in developing a relapse prevention plan, he agreed.     Patient is currently experiencing/not experiencing symptoms of withdrawal such as sleeplessness, headache, anxiety, irritability, and intense cravings to smoke.       Provided motivation and encouragement. Shared that slipping up here and there doesn't necessarily mean he failed; rather, it's an opportunity to reflect and modify his/ behaviors and habits and to employ alternate strategies to deal with strong triggers.      Patients last cigarette/vape/e-cig/smokeless tobacco:   Day of admission    Patients main reason for smoking include:  Stress relief and wife smokes. He quit for 7 years previously and when his dad  he started again.    Top Reasons to quit smoking:   health and save money    Types of Tobacco and Amount   Cigarettes      X   E-Cigs    Smokeless Tobacco    Cigars    Pipes    Waterpipes      Fagerstrom Test for Nicotine Dependence   How soon after waking do you smoke your first cigarette Within 5 minutes=3  5-30 minutes=2  31-60 minute=1  >61 minutes=0      2        Do you find it difficult to refrain from smoking in places where it is forbidden? e.g. Taoist, restaurants, etc? Yes=1  No=0        0  "  Which cigarette would you hate to give up? The first in the morning=1  Any other=0      0   How many cigarettes a day do you smoke? 10 or less=0  11-20=1  21-30=2  31 or more=3 1   Do you smoke more frequently in the morning?   Yes=1  No=0 0   Do you smoke even if you are sick in bed most of the day? Yes=1  No=0 0                                                                                                                                                 Total Score 3   SCORE 1-2 = Low Dependence           3-4 = Low to Mod Dependence     5-7 = Moderate Dependence            8+ = High Dependence           Stage of Behavior Change:   Pre-contemplation - No intention    Contemplation - Change on the horizon    Preparation - Getting Ready      X   Action - Consistently changed (within 6 months)    Maintenance - Staying quit (more than 6 months)    Relapse - Recycling            Patients Motivation to Quit Scale    Importance (1-10):        6   Confidence  (1-10):        9   Can Patient Imagine a Future without Smoking:      YES   Quit Attempt Date:     8/31/20   Final Quit Date:           Education/Recommendations    Education: Provided educational workbook, \"Quitting for Good with Treatment and Support\". Discussed health risks of continued smoking.     Recommendations: Encouraged patient to use workbook to help him understand why he smokes, come up with 5 reasons to quit, and to imagine what his future looks like without smoking. Encouraged him to develop strategies to distract himself to get past cravings  .   -Developed Smoking Cessation Relapse Prevention Plan that includes recommendation of:     -Use 21 mg patch daily for 4-6 weeks of smoking abstinence based on assessment of patients dependence level. Taper every 2-4 weeks in 7 mg steps as tolerated.     Patient declines the use of lozenges or gum. He prefers to suck on lollipops.      Time Spent: I spent 30 minutes with patient. Will notify provider of " recommendations.    Gave contact information and will call 7 days after discharge to provide support.    YADIRA Alfredo, RRT, CTTS  Chronic Pulmonary Disease Specialist  Office: [phone]   Pager: [phone]

## 2020-09-02 NOTE — PROGRESS NOTES
"  GENERAL ID SERVICE: Progress Note     Patient:  Parker Acevedo   Date of birth 1970, Medical record number [id number]  Date of Visit:  09/02/2020  Date of Admission: 8/31/2020  Consult Requester:Eduardo Wolf          Interim Course (9/2/2020):   S:   Patient vomited overnight with streaks of blood and continued to endorse abdominal pain. He is a little confused about the plan for removing his Cm line, stating that he thought the Cm was supposed to be removed today. IR note says he is scheduled for Cm removal tomorrow. Patient unsure whether his G-tube will be replaced at the same time.    O:  /77 (BP Location: Left arm)   Pulse 89   Temp 97.8  F (36.6  C) (Oral)   Resp 20   Ht 1.803 m (5' 11\")   Wt 91.8 kg (202 lb 6.1 oz)   SpO2 97%   BMI 28.23 kg/m     General: not in acute distress  Pulm: clear to auscultation bilaterally w/o crackles or wheezes  Cardiac: RRR no murmurs auscultated  Neuro: alert/oriented. Normal mentation   MSK: able to move all 4 extremities independently. Sitting up in bed.    Assessment:  1. Concern for intravascular non-hemodialysis catheter-related infection - drainage from line  2. TPN dependent  3. Two positive blood culture with G+ cocci; One positive blood culture with G+ rods    Discussion:  - Lyme titers within normal limits. Low concern for acute lyme disease.  Central line culture     Plan:  1. Recheck blood cultures this evening - these will hopefully be negative since line is now removed. If they stay negative, line can be replaced Friday afternoon to avoid patient needing to remain inpt for the weekend.   2. Continue IV vancomycin for coverage of G+ cocci/rods.  3. Agree with Cm removal. S lugdunensis is a CoNS that acts like S aureus and this species requires line removal when found.     Thank you for this consult. ID will continue to follow.     Recs discussed with primary team.     Patient was discussed with Dr." Dudley.    Alfonso Bowling, MS4  Pager: [phone]    Scribe Disclosure:   I, Alfonso Bowling, am serving as a scribe; to document services personally performed by Марина Buckner MD - -based on data collection and the provider's statements to me.     Provider Disclosure:  I agree with above History, Review of Systems, Physical exam and Plan.  I have reviewed the content of the documentation and have edited it as needed. I have personally performed the services documented here and the documentation accurately represents those services and the decisions I have made.      Марина Buckner MD  Infectious Diseases  [phone] (TextPage)     ________________________________________________________________    Consult Question:.  Admission Diagnosis: Gram-positive bacteremia [R78.81]         History of Present Illness:   Patient is a 48 yo M with a history of bariatric surgery on chronic TPN, recurrent CLABSI, short-gut syndrome, and a hx of MRSE bacteremia, strep bacteremia, and corynebacteria bacteremia who presents today with 2 days of Fever wit G+ rods found on blood culture.    Patient states that he has had over 20 episodes of bacteremia over the past 4-5 years. The most recent episode, for which he presented to the hospital this time, started on 8/27. Patient called the [facility] b/c of fever, chills, and night-sweats that had occurred for over 2 days. He would check his temperature at home. And it would fluctuate between 100.x up to a Tmax of 101.9. After calling the [facility], he went to have blood cultures drawn per their guidance. On  8/30, the blood cultures came back positive growing G+ rods. Patient was informed to come to the ED for continued management. Per the ED note the blood cultures were positive via peripheral draw (negative x2 from central line port).    Patient lives in [facility] in a home on a 3-acre property. He states he likes to walk in the woods on his  property for leisure. Patient often notices ticks, but has not noticed any tick bites. Patient has multiple dogs at home. Patient lives with wife and kids. Nobody in his family has been sick. Patient states his reticular rash on his upper extremity is secondary to his time spent in the hot-tub.    Patient today endorses mild headache with resolved fevers, chills, night sweats, and diarrhea. Prior to hospitalization, patient stated he experienced fevers, chills, night sweats, headache, joint pain, sore throat, palpitations, diarrhea and nausea/vomiting, and abdominal pain that is chronic s/p surgery. Patient endorsed no chest pain, dyspnea, cough, dizziness, dysuria, or hematuria.    Patient also stated during re-examination that he has noted drainage from his Cm line with clots and granular material.     Summary of Recent Hospitalizations:  8/3/2020 = repeat dual-lumen Cm replaced.  7/28-8/3/20 - treated for MRSE bactermia secondary to central venous catheter; repeat dual-lumen Cm replaced  July 2020 - treated for Corynebacterium infection with Vancomycin  11/4/19 - peripheral cultures negative; but initial cultures grew Corneybacterium and Peptostreptococcus which were deemed contaminants.  10/4/19 - Strep, Staph, and Granulicatella adiacens bactermia; Cm replaced on 10/1  5/16/19 - blood draw from PICC grew Rothia mucilaginosa  1/9/19 - Candidemia sepsis due to PICC line infection.         Labs/Imaging:     Results for orders placed or performed during the hospital encounter of 08/31/20 (from the past 24 hour(s))   Interventional Radiology Adult/Peds IP Consult: Patient to be seen: Routine within 24 hours; Call back #: [phone]; Remove Cm CVC - pt with gram pos khalif + cocci bactermia in peripheral bcx; also needs ELISA KEY replaced if possible; Request...    Narrative    Linda Queen PA-C     9/2/2020 11:21 AM  INTERVENTIONAL RADIOLOGY CONSULT NOTE    Parker is a 49 year old male with  history of bariatiric surgery on   chronic TPN now admitted with fevers and found to have gram   positive rods on blood culture after recent MRSE bacteremia 2/2   Cm CVC infection.  TCVC line was recently replaced by IR on   8/7/20 after infection.     Request for G tube replacement. He had an 18 Fr G tube which fell   out 15 days ago with nothing in the tract. Per patient he is set   up with surgery to have the tube placed again. No IR intervention   on G tube today.    Patient is on IR schedule Wednesday 9/3 for a tunneled central   venous catheter removal.   Labs WNL for procedure. COVID negative.  No NPO required.  Medications to be held include: None  Consent will be done prior to procedure.     Platelet Count   Date Value Ref Range Status   09/02/2020 194 150 - 450 10e9/L Final     INR   Date Value Ref Range Status   08/03/2020 1.12 0.86 - 1.14 Final        Please contact the IR charge RN at [phone] for estimated time of   procedure.     Case discussed with Dr. Urbina and Dr. Alvarado.    Linda Queen PA-C  Interventional Radiology       Blood culture    Specimen: Blood    PURPLE PORT   Result Value Ref Range    Specimen Description Blood PURPLE PORT     Special Requests PURPLE PORT     Culture Micro No growth after 1 hour    Lyme Disease Kaye with reflex to WB Serum   Result Value Ref Range    Lyme Disease Antibodies Serum 0.06 0.00 - 0.89   Basic metabolic panel   Result Value Ref Range    Sodium 139 133 - 144 mmol/L    Potassium 3.7 3.4 - 5.3 mmol/L    Chloride 106 94 - 109 mmol/L    Carbon Dioxide 29 20 - 32 mmol/L    Anion Gap 4 3 - 14 mmol/L    Glucose 97 70 - 99 mg/dL    Urea Nitrogen 11 7 - 30 mg/dL    Creatinine 0.68 0.66 - 1.25 mg/dL    GFR Estimate >90 >60 mL/min/[1.73_m2]    GFR Estimate If Black >90 >60 mL/min/[1.73_m2]    Calcium 8.4 (L) 8.5 - 10.1 mg/dL   CBC with platelets   Result Value Ref Range    WBC 8.6 4.0 - 11.0 10e9/L    RBC Count 4.13 (L) 4.4 - 5.9 10e12/L    Hemoglobin 11.9 (L) 13.3 -  17.7 g/dL    Hematocrit 37.9 (L) 40.0 - 53.0 %    MCV 92 78 - 100 fl    MCH 28.8 26.5 - 33.0 pg    MCHC 31.4 (L) 31.5 - 36.5 g/dL    RDW 17.0 (H) 10.0 - 15.0 %    Platelet Count 194 150 - 450 10e9/L   Blood culture    Specimen: Blood    Left Hand   Result Value Ref Range    Specimen Description Blood Left Hand     Culture Micro No growth after 1 hour    Vancomycin level   Result Value Ref Range    Vancomycin Level 24.3 mg/L     *Note: Due to a large number of results and/or encounters for the requested time period, some results have not been displayed. A complete set of results can be found in Results Review.

## 2020-09-02 NOTE — PROVIDER NOTIFICATION
"Writer pagechely RICHARD:    \"Lab called stating that blood cultures turned out to be positive with cocci in clusters. \"    Penny Alvarado RN on 9/1/2020 at 11:36 PM    "

## 2020-09-02 NOTE — PROGRESS NOTES
[facility], [place]    Progress Note - [place] 2 Service   Date of Admission:  8/31/2020    Assessment & Plan    Mr. Acevedo is a 50yo M with PMHx notable for s/p bariatric surgery on chronicTPN, recurrent CLABSI, gastric ulcer, short gut syndrome, here after approximately 2 days of fever and subsequently found to have a gram positive rods on blood culture after recent MRSE bacteremia 2/2 Cm CVC infection.      Changes today:  - IR removal of tunneled line today with cath tip cx  - Follow-up cultures   - TTE    # Gram Positive Rods & Cocci bacteremia   # Hx recurrent polymicrobial bacteremia  Pt admitted 7/28/2020 for 1 week of fevers, chills, and night sweats, concerning for central line infection and bacteremia. Had MRSE from Cm CVC.  Bcx cleared except Cm port removed by IR on 7/30/2020, port tip with staph epi consistent with blood cultures from port.  Repeat dual-lumen Cm replaced on 8/3/2020. Completed 7d IV vancomycin from date of port reevaluation (7/30-8/6). Has hx of Streptococcus and Staph Epidermidis bacteremia. He is presenting now with gram positive rods and cocci, will follow cultures. Will obtain TTE to look for signs of endocarditis given limited period of clearance following discharge.   - ID consulted, appreciate recs   - Continue IV vancomycin (8/31-)   - PO benadryl given with vancomycin    - Remove Cm CVC (IR order placed) given drainage, plan for at least 48 hour holiday   - Hold off on TPN, 1L LR over 10 hrs  - Cultures:   - 9/2 L hand and port: NGTD   - 9/1 L hand x2: NGTD   - 8/31 port: staph lugdunensis    - 8/31 L arm: NGTD    - 8/29 R hand: gram + rods and gram + cocci              - 8/28 port x2: NGTD  - Lyme titers ordered   - TTE    # Hx Celestino-en-Y gastric bypass, esophagojejunostomy c/b short gut syndrome and chronic malnutrition  # Chronic Abdominal Pain  ELISA KEY tube fell out prior to arrival - pt uses to vent. Will need to be  replaced.  - Patient would like to follow-up OP for replacement of Gtube, has this arranged already  - Hold TPN for now, giving LR IVF  - GI cocktail prn, sucralfate   - Continue cyanocobalamin, vitamin D2   - Pain management:    - pta acetaminophen, fentanyl, oxycodone    - oxycodone increased to TID during admission (Pain Management provider already refilled his home narcotics for discharge)     #HTN: continue coreg  # Asthma: continue albuterol inhaler  # ADHD: continue adderall   # Anxiety: continue lorazepam prn once daily for TPN, medication administration   # GERD: continue pta PPI   # Normocytic anemia: may be 2/2 iron deficiency anemia vs. anemia of chronic disease. Previous iron levels have been wnl.      F/E/N:  - IV Fluids: 1L LR  - Electrolytes: Replete electrolytes as needed per protocol  - Diet: no fluids overnight, but would consider in AM      Diet: NPO for Medical/Clinical Reasons Except for: Meds, ok for regular diet following procedure   VTE ppx: Mechanical  Lines/Devices/Access: Cm (to be removed)  Code Status: Full code  Disposition: pending     Disposition Plan   Expected discharge: 2 - 3 days, recommended to prior living arrangement once antibiotic plan established.  Entered: Elaine Cutler MD 09/02/2020, 1:28 PM     The patient's care was discussed with the Attending Physician, Dr. Wolf.    Elaine Cutler MD  [address], [place]  Pager: [phone]  Please see sticky note for cross cover information  ______________________________________________________________________    Interval History   NAEO. Pt doing well this AM. Reports that he had some night sweats overnight but denies fevers and chills this morning. He has some ttp around the sight of the CVC, plan for removal today. He otherwise has some ongoing abdominal pain which seems to be at its baseline. Had an isolated episode of blood streaked emesis overnight, not very nauseous  this morning. Notes that following his discharge from his last hospitalization he did burn his LUE, which blistered but is now healing well. Wants his Gtube replaced by his outpatient provider, and already has this all set up.     Data reviewed today: I reviewed all medications, new labs and imaging results over the last 24 hours. I personally reviewed no images or EKG's today.    Physical Exam   Vital Signs: Temp: 97.8  F (36.6  C) Temp src: Oral BP: 116/77 Pulse: 89   Resp: 20 SpO2: 97 % O2 Device: None (Room air)    Weight: 202 lbs 6.12 oz  Constitutional: sitting up in bed in no acute distress, glasses on   Respiratory: No increased work of breathing, good air exchange, clear to auscultation bilaterally, no crackles or wheezing  Cardiovascular: RRR, pulses 2+  GI: soft, non tender, non distended; covered with clean, dry dressing   Skin: port in place over R anterior chest which is mildly ttp, no surrounding erythema   Musculoskeletal: no peripheral edema, extremities are warm and well perfused   Neurologic: alert and oriented, conversing appropriately    Data   Recent Labs   Lab 09/02/20  0559 09/01/20  0550 08/31/20  1822   WBC 8.6 7.6 9.2   HGB 11.9* 11.3* 11.9*   MCV 92 92 91    192 220     --  141   POTASSIUM 3.7  --  4.0   CHLORIDE 106  --  112*   CO2 29  --  26   BUN 11  --  13   CR 0.68  --  0.63*   ANIONGAP 4  --  3   SILAS 8.4*  --  8.1*   GLC 97  --  82   ALBUMIN  --   --  3.2*   PROTTOTAL  --   --  6.5*   BILITOTAL  --   --  0.3   ALKPHOS  --   --  69   ALT  --   --  25   AST  --   --  12     No results found for this or any previous visit (from the past 24 hour(s)).  Medications       amphetamine-dextroamphetamine  20 mg Oral Daily     carvedilol  6.25 mg Oral BID w/meals     [START ON 9/7/2020] cyanocobalamin  1,000 mcg Intramuscular Q30 Days     diphenhydrAMINE  25 mg Oral Q8H     fentaNYL  25 mcg Transdermal Q48H     fentaNYL   Transdermal Q8H     lidocaine viscous 2% 15 mL and  maalox/mylanta w/ simethicone 15 mL (GI COCKTAIL)  30 mL Oral At Bedtime     nicotine  1 patch Transdermal Daily     nicotine   Transdermal Q8H     pantoprazole  40 mg Oral Daily     sodium chloride (PF)  3 mL Intracatheter Q8H     vancomycin (VANCOCIN) IV  1,750 mg Intravenous Q12H     [START ON 9/6/2020] vitamin D2  50,000 Units Oral Weekly

## 2020-09-02 NOTE — CONSULTS
INTERVENTIONAL RADIOLOGY CONSULT NOTE    Parker is a 49 year old male with history of bariatiric surgery on chronic TPN now admitted with fevers and found to have gram positive rods on blood culture after recent MRSE bacteremia 2/2 Cm CVC infection.  TCVC line was recently replaced by IR on 8/7/20 after infection.     Request for G tube replacement. He had an 18 Fr G tube which fell out 15 days ago with nothing in the tract. Per patient he is set up with surgery to have the tube placed again. No IR intervention on G tube today.    Patient is on IR schedule Wednesday 9/3 for a tunneled central venous catheter removal.   Labs WNL for procedure. COVID negative.  No NPO required.  Medications to be held include: None  Consent will be done prior to procedure.     Platelet Count   Date Value Ref Range Status   09/02/2020 194 150 - 450 10e9/L Final     INR   Date Value Ref Range Status   08/03/2020 1.12 0.86 - 1.14 Final        Please contact the IR charge RN at [phone] for estimated time of procedure.     Case discussed with Dr. Urbina and Dr. Alvarado.    Linda Queen PA-C  Interventional Radiology

## 2020-09-02 NOTE — PROGRESS NOTES
Patient Name: Parker Acevedo  Medical Record Number: [id number]  Today's Date: 9/2/2020    Procedure: Tunneled central venous catheter removal  Proceduralist: Best Enriquez PA-C    Procedure Start: 1342  Procedure end: 1354  Sedation medications administered: NA     Report given to: Sandie HARRISON   : YOAV    Other Notes: Pt arrived to IR room 2 from . Consent reviewed. Pt denies any questions or concerns regarding procedure. Pt positioned supine and monitored per protocol. Tip sent to lab for culture. Leave patient upright for 2 hours.Pt tolerated procedure without any noted complications. Pt transferred back to .

## 2020-09-03 ENCOUNTER — MYC MEDICAL ADVICE (OUTPATIENT)
Dept: PALLIATIVE MEDICINE | Facility: CLINIC | Age: 50
End: 2020-09-03

## 2020-09-03 LAB
ANION GAP SERPL CALCULATED.3IONS-SCNC: 4 MMOL/L (ref 3–14)
BACTERIA SPEC CULT: NO GROWTH
BACTERIA SPEC CULT: NO GROWTH
BUN SERPL-MCNC: 14 MG/DL (ref 7–30)
CALCIUM SERPL-MCNC: 8.5 MG/DL (ref 8.5–10.1)
CHLORIDE SERPL-SCNC: 106 MMOL/L (ref 94–109)
CO2 SERPL-SCNC: 28 MMOL/L (ref 20–32)
CREAT SERPL-MCNC: 0.79 MG/DL (ref 0.66–1.25)
ERYTHROCYTE [DISTWIDTH] IN BLOOD BY AUTOMATED COUNT: 17.1 % (ref 10–15)
GFR SERPL CREATININE-BSD FRML MDRD: >90 ML/MIN/{1.73_M2}
GLUCOSE SERPL-MCNC: 94 MG/DL (ref 70–99)
HCT VFR BLD AUTO: 36.9 % (ref 40–53)
HGB BLD-MCNC: 11.3 G/DL (ref 13.3–17.7)
MCH RBC QN AUTO: 28.4 PG (ref 26.5–33)
MCHC RBC AUTO-ENTMCNC: 30.6 G/DL (ref 31.5–36.5)
MCV RBC AUTO: 93 FL (ref 78–100)
PLATELET # BLD AUTO: 198 10E9/L (ref 150–450)
POTASSIUM SERPL-SCNC: 3.9 MMOL/L (ref 3.4–5.3)
RBC # BLD AUTO: 3.98 10E12/L (ref 4.4–5.9)
SODIUM SERPL-SCNC: 139 MMOL/L (ref 133–144)
SPECIMEN SOURCE: NORMAL
SPECIMEN SOURCE: NORMAL
WBC # BLD AUTO: 5.5 10E9/L (ref 4–11)

## 2020-09-03 PROCEDURE — 12000001 ZZH R&B MED SURG/OB UMMC

## 2020-09-03 PROCEDURE — 40000556 ZZH STATISTIC PERIPHERAL IV START W US GUIDANCE

## 2020-09-03 PROCEDURE — 87040 BLOOD CULTURE FOR BACTERIA: CPT | Performed by: INTERNAL MEDICINE

## 2020-09-03 PROCEDURE — 25000125 ZZHC RX 250: Performed by: STUDENT IN AN ORGANIZED HEALTH CARE EDUCATION/TRAINING PROGRAM

## 2020-09-03 PROCEDURE — 99233 SBSQ HOSP IP/OBS HIGH 50: CPT | Mod: GC | Performed by: INTERNAL MEDICINE

## 2020-09-03 PROCEDURE — 85027 COMPLETE CBC AUTOMATED: CPT | Performed by: INTERNAL MEDICINE

## 2020-09-03 PROCEDURE — 25000128 H RX IP 250 OP 636: Performed by: INTERNAL MEDICINE

## 2020-09-03 PROCEDURE — 25800030 ZZH RX IP 258 OP 636: Performed by: INTERNAL MEDICINE

## 2020-09-03 PROCEDURE — 25000132 ZZH RX MED GY IP 250 OP 250 PS 637: Performed by: STUDENT IN AN ORGANIZED HEALTH CARE EDUCATION/TRAINING PROGRAM

## 2020-09-03 PROCEDURE — 80048 BASIC METABOLIC PNL TOTAL CA: CPT | Performed by: INTERNAL MEDICINE

## 2020-09-03 PROCEDURE — 25000128 H RX IP 250 OP 636: Performed by: STUDENT IN AN ORGANIZED HEALTH CARE EDUCATION/TRAINING PROGRAM

## 2020-09-03 PROCEDURE — 36415 COLL VENOUS BLD VENIPUNCTURE: CPT | Performed by: INTERNAL MEDICINE

## 2020-09-03 RX ORDER — CEFAZOLIN SODIUM 2 G/100ML
2 INJECTION, SOLUTION INTRAVENOUS EVERY 8 HOURS
Status: DISCONTINUED | OUTPATIENT
Start: 2020-09-03 | End: 2020-09-04 | Stop reason: HOSPADM

## 2020-09-03 RX ORDER — CEFAZOLIN SODIUM 1 G/3ML
1 INJECTION, POWDER, FOR SOLUTION INTRAMUSCULAR; INTRAVENOUS EVERY 8 HOURS
Status: DISCONTINUED | OUTPATIENT
Start: 2020-09-03 | End: 2020-09-03

## 2020-09-03 RX ADMIN — DIPHENHYDRAMINE HYDROCHLORIDE 25 MG: 25 CAPSULE ORAL at 18:01

## 2020-09-03 RX ADMIN — LIDOCAINE HYDROCHLORIDE 30 ML: 20 SOLUTION ORAL; TOPICAL at 22:06

## 2020-09-03 RX ADMIN — OXYCODONE HYDROCHLORIDE 10 MG: 5 SOLUTION ORAL at 22:07

## 2020-09-03 RX ADMIN — CARVEDILOL 6.25 MG: 6.25 TABLET, FILM COATED ORAL at 07:54

## 2020-09-03 RX ADMIN — VANCOMYCIN HYDROCHLORIDE 1750 MG: 10 INJECTION, POWDER, LYOPHILIZED, FOR SOLUTION INTRAVENOUS at 08:05

## 2020-09-03 RX ADMIN — DIPHENHYDRAMINE HYDROCHLORIDE 25 MG: 25 CAPSULE ORAL at 06:31

## 2020-09-03 RX ADMIN — OXYCODONE HYDROCHLORIDE 10 MG: 5 SOLUTION ORAL at 13:11

## 2020-09-03 RX ADMIN — CARVEDILOL 6.25 MG: 6.25 TABLET, FILM COATED ORAL at 18:01

## 2020-09-03 RX ADMIN — ONDANSETRON 8 MG: 4 TABLET, ORALLY DISINTEGRATING ORAL at 22:07

## 2020-09-03 RX ADMIN — LIDOCAINE HYDROCHLORIDE 30 ML: 20 SOLUTION ORAL; TOPICAL at 13:11

## 2020-09-03 RX ADMIN — PANTOPRAZOLE SODIUM 40 MG: 40 TABLET, DELAYED RELEASE ORAL at 07:54

## 2020-09-03 RX ADMIN — DEXTROAMPHETAMINE SACCHARATE, AMPHETAMINE ASPARTATE, DEXTROAMPHETAMINE SULFATE AND AMPHETAMINE SULFATE 20 MG: 2.5; 2.5; 2.5; 2.5 TABLET ORAL at 07:54

## 2020-09-03 RX ADMIN — NICOTINE 1 PATCH: 21 PATCH TRANSDERMAL at 07:55

## 2020-09-03 RX ADMIN — ONDANSETRON 8 MG: 4 TABLET, ORALLY DISINTEGRATING ORAL at 00:26

## 2020-09-03 RX ADMIN — LORAZEPAM 1 MG: 1 TABLET ORAL at 22:08

## 2020-09-03 RX ADMIN — FENTANYL 1 PATCH: 25 PATCH, EXTENDED RELEASE TRANSDERMAL at 07:54

## 2020-09-03 RX ADMIN — OXYCODONE HYDROCHLORIDE 10 MG: 5 SOLUTION ORAL at 04:01

## 2020-09-03 ASSESSMENT — PAIN DESCRIPTION - DESCRIPTORS
DESCRIPTORS: SORE
DESCRIPTORS: SORE

## 2020-09-03 ASSESSMENT — MIFFLIN-ST. JEOR: SCORE: 1800.7

## 2020-09-03 ASSESSMENT — ACTIVITIES OF DAILY LIVING (ADL)
ADLS_ACUITY_SCORE: 9

## 2020-09-03 NOTE — PROGRESS NOTES
"  GENERAL ID SERVICE: Progress Note     Patient:  Parker Acevedo   Date of birth 1970, Medical record number [id number]  Date of Visit:  09/03/2020  Date of Admission: 8/31/2020  Consult Requester:Eduardo Wolf          Interim Course (9/2/2020):   S:   Patient did well overnight. Wants to have a new line placed as soon as possible so that he can be discharged Friday and would not have to wait an additional 3 days in the hospital just to be discharged.    O:  /74 (BP Location: Left arm)   Pulse 65   Temp 96.8  F (36  C) (Oral)   Resp 18   Ht 1.803 m (5' 11\")   Wt 91.8 kg (202 lb 6.1 oz)   SpO2 99%   BMI 28.23 kg/m     General: not in acute distress  Pulm: clear to auscultation bilaterally w/o crackles or wheezes  Cardiac: RRR no murmurs auscultated  Neuro: alert/oriented. Normal mentation   MSK: able to move all 4 extremities independently. Sitting up in bed.    Assessment:  1. Intravascular non-hemodialysis catheter-related infection - drainage from line  2. TPN dependent  3. S. lugdunensis growth on blood culture from line  4. GPR and Gram negative coccobacilli from single peripheral culture. Unclear significance, but currently we do not plan to change therapy based on results of this single culture.     Discussion:  Treatment of S. lugdunensis is a species of coagulase negative staphylococcus with virulence that  matches that of S. aureus. They are treated in the same way.  His S. lugdunensis is susceptible to cefazolin and vancomycin. Better outcomes have been shown with beta lactams so we would treat him with cefazolin while inpatient. If his only home option is vancomycin, he can then be discharged on this agent. Unless he has additional positive cultures, his current bacteremia can be considered uncomplicated and he can receive 2 weeks of antibiotics.     TTE was done on 9/2 and showed no vegetations. No indication for RONEL given brief episode of bacteremia.     Plan:  1. " Switch antibiotics to IV cefazolin 2g Q8H; patient has anaphylactic reaction to penicillins, but has been able to tolerate cephalosporins in the past.  2. Place new central access on Friday.  3. On discharge switch back to IV vancomycin, and discharge with IV vancomycin for 2 weeks (since only IV vancomycin is accepted by his insurance at home). End date 9/14.   4. Weekly BUN, Creatinine, and CBC and vancomycin level after discharge for 2 weeks (vancomycin Goal Trough 15-20). Fax results to [phone].   5. Follow-up scheduled on Sept 24th @ 9:30 with Dr. Buckner.    It has been a pleasure to be involved with this patient's care. We will sign off for now, but please feel free to call with additional questions.     Recs discussed with primary team.     Patient was discussed with Dr. Buckner.    Alfonso Bowling, MS4  Pager: [phone]    Attestation:    I have seen and evaluated the patient with the medical student. I have edited this note (edits in italics) and agree with the above documented findings and plan. I have reviewed today's vital signs, medications, labs and imaging.    Марина Buckner MD  Division of Infectious Diseases and International Medicine  Pager: [phone]          ________________________________________________________________    Consult Question:.  Admission Diagnosis: Gram-positive bacteremia [R78.81]         History of Present Illness:   Patient is a 48 yo M with a history of bariatric surgery on chronic TPN, recurrent CLABSI, short-gut syndrome, and a hx of MRSE bacteremia, strep bacteremia, and corynebacteria bacteremia who presents today with 2 days of Fever wit G+ rods found on blood culture.    Patient states that he has had over 20 episodes of bacteremia over the past 4-5 years. The most recent episode, for which he presented to the hospital this time, started on 8/27. Patient called the [facility] infusion pharmacy b/c of fever, chills, and night-sweats that had occurred for over 2 days. He  would check his temperature at home. And it would fluctuate between 100.x up to a Tmax of 101.9. After calling the [facility] infusion pharmacy, he went to have blood cultures drawn per their guidance. On  8/30, the blood cultures came back positive growing G+ rods. Patient was informed to come to the ED for continued management. Per the ED note the blood cultures were positive via peripheral draw (negative x2 from central line port).    Patient lives in [facility] in a home on a 3-acre property. He states he likes to walk in the woods on his property for leisure. Patient often notices ticks, but has not noticed any tick bites. Patient has multiple dogs at home. Patient lives with wife and kids. Nobody in his family has been sick. Patient states his reticular rash on his upper extremity is secondary to his time spent in the hot-tub.    Patient today endorses mild headache with resolved fevers, chills, night sweats, and diarrhea. Prior to hospitalization, patient stated he experienced fevers, chills, night sweats, headache, joint pain, sore throat, palpitations, diarrhea and nausea/vomiting, and abdominal pain that is chronic s/p surgery. Patient endorsed no chest pain, dyspnea, cough, dizziness, dysuria, or hematuria.    Patient also stated during re-examination that he has noted drainage from his Cm line with clots and granular material.     Summary of Recent Hospitalizations:  8/3/2020 = repeat dual-lumen Cm replaced.  7/28-8/3/20 - treated for MRSE bactermia secondary to central venous catheter; repeat dual-lumen Cm replaced  July 2020 - treated for Corynebacterium infection with Vancomycin  11/4/19 - peripheral cultures negative; but initial cultures grew Corneybacterium and Peptostreptococcus which were deemed contaminants.  10/4/19 - Strep, Staph, and Granulicatella adiacens bactermia; Cm replaced on 10/1  5/16/19 - blood draw from PICC grew Rothia mucilaginosa  1/9/19 - Candidemia  sepsis due to PICC line infection.         Labs/Imaging:     Results for orders placed or performed during the hospital encounter of 08/31/20 (from the past 24 hour(s))   Catheter Tip Culture Aerobic Bacterial    Specimen: Catheter tip   Result Value Ref Range    Specimen Description Catheter     Special Requests CENTRAL LINE     Culture Micro Culture negative monitoring continues    IR CVC Tunnel Removal Right    Narrative    Procedure date: 9/2/2020.     Procedure: Tunneled central venous catheter removal.     History: The patient had a  right internal jugular tunneled central  venous catheter which had been used for long-term venous access.  Patient now admitted with bacteremia, and catheter removal was  requested.    Preop diagnosis: Bacteremia.    Postop diagnosis: Same.    : Best Enriquez PA-C.    Assist: None.    Medications: 1% lidocaine, 5 cc subcutaneously.    Face-to-face sedation time: None.    Nursing: Patient was monitored by IR nursing staff under my  supervision, and remained stable throughout the procedure.    Findings: Physical examination demonstrated no warmth, erythema,  fluctuance, discharge, or tenderness at the catheter exit site. The  catheter entry site was prepped and draped in usual sterile fashion.  The retention sutures were cut. Following infiltration around the  catheter with 1% lidocaine, traction was used to free the cuff from  the subcutaneous tissues. The catheter was then removed intact and  without difficulty. Pressure was applied over the venotomy site as  well as along the tract until hemostasis was achieved. The catheter  tip was transected and sent for culture. Site was cleansed and dressed  with a sterile bandage. The procedure was well tolerated, with no  immediate complications. The patient was returned to the inpatient  care unit in stable condition.    Fluoroscopy time: None.    Estimated blood loss: Less than 1 cc.    Specimen: Catheter tip sent for culture       Impression    Impression: right IJ, 9.5 [place] dual-lumen tunneled central venous  catheter removed intact and without difficulty. Lab results pending.    Plan: Patient to remain upright for 2 hours. No strenuous activity for  3 days. Keep site clean, dry, and covered for 48 hours.  Change the  dressing if it becomes soiled, wet, or blood soaked.  After 48 hours  the dressing may be removed and the site may be left uncovered and may  get wet if the site has healed.  If the site has not healed keep it  covered and dry with daily dressing changes until healed.  Monitor the  supraclavicular site for swelling, as well as the catheter tract and  exit site for bleeding or infection as instructed.  Phone  Interventional Radiology if signs or symptoms develop.  If the site  bleeds, sit upright and hold pressure on the site for 10 minutes.  If  it continues to bleed, continue holding pressure and phone the  Interventional Radiology.    Approximately 10 minutes of direct face-to-face time occurred with the  patient during this encounter.    Attestation: The physician assistant (PA) who performed this procedure  and signed the above report is licensed to practice in the [facility] pursuant to [place] Statute 147A.09.  This includes meeting the  Statute and [place] Board of Medical Practice requirement of an  active Delegation Agreement, which documents delegation of services by  primary and alternate supervising physicians. All services rendered  are performed under a collaborative agreement with Dr. Stas Colindres, Director of Interventional Radiology, [facility].    KOKO ENRIQUEZ PA-C   IR Procedure Note    Narrative    Koko Enriquez PA-C     9/2/2020  1:59 PM  [facility], [place]    Procedure: IR Procedure Note    Date/Time: 9/2/2020 1:57 PM  Performed by: Koko Enriquez PA-C  Authorized by: Koko Enriquez PA-C     UNIVERSAL PROTOCOL    Site Marked: NA  Prior Images Obtained and Reviewed:  Yes  Required items: Required blood products, implants, devices and special   equipment available    Patient identity confirmed:  Verbally with patient, arm band, provided   demographic data and hospital-assigned identification number  Patient was reevaluated immediately before administering moderate or deep   sedation or anesthesia  Confirmation Checklist:  Patient's identity using two indicators, relevant   allergies, procedure was appropriate and matched the consent or emergent   situation and correct equipment/implants were available  Time out: Immediately prior to the procedure a time out was called    Universal Protocol: the Joint Commission Universal Protocol was followed    Preparation: Patient was prepped and draped in usual sterile fashion    ESBL (mL):  5         ANESTHESIA    Anesthesia: Local infiltration  Local Anesthetic:  Lidocaine 1% without epinephrine      SEDATION    Patient Sedated: No    See dictated procedure note for full details.  Findings: Existing right internal jugular, 9.5 Fr., dual lumen tunneled   central venous catheter removed intact and without difficulty.    Specimens: other (see comment) (Catheter tip sent for culture.)    Complications: None    Condition: Stable    Plan: Follow up per primary team. Upright for 2 hours, no strenuous   activity for 3 days.    PROCEDURE   Patient Tolerance:  Patient tolerated the procedure well with no immediate   complications    Length of time physician/provider present for 1:1 monitoring during   sedation: 0   Echo Complete    Narrative    [id number]  [id number]  [id number]  [id number]^EMANUEL^ADEEL^[facility]  Echocardiography Laboratory  [address] [place] [id number]     Name: SARA HASSAN  MRN: [id number]  : 1970  Study Date: 2020 02:51 PM  Age: 49 yrs  Gender: Male  Patient Location: [facility]  Reason For Study:  Endocarditis  Ordering Physician: ADEEL CASTELLANOS  Performed By: Earlene Lux RDCS     BSA: 2.1 m2  Height: 71 in  Weight: 202 lb  HR: 73  BP: 116/77 mmHg  _____________________________________________________________________________  __        Procedure  Echocardiogram with two-dimensional, color and spectral Doppler performed.  _____________________________________________________________________________  __        Interpretation Summary  No evidence of vegetations to suggest endocarditis.  Left ventricular function, chamber size, wall motion, and wall thickness are  normal.The EF is 55-60%. Left ventricular diastolic function is not  assessable.  Right ventricular function, chamber size, wall motion, and thickness are  normal.  No significant changes.  _____________________________________________________________________________  __        Left Ventricle  Left ventricular function, chamber size, wall motion, and wall thickness are  normal.The EF is 55-60%. Left ventricular diastolic function is not  assessable. No regional wall motion abnormalities are seen.     Right Ventricle  Right ventricular function, chamber size, wall motion, and thickness are  normal.     Atria  Both atria appear normal.     Mitral Valve  The mitral valve is normal.        Aortic Valve  Aortic valve is normal in structure and function. No aortic regurgitation is  present.     Tricuspid Valve  The tricuspid valve is normal. Pulmonary artery systolic pressure cannot be  assessed. The peak velocity of the tricuspid regurgitant jet is not  obtainable.     Pulmonic Valve  The pulmonic valve is normal.     Vessels  The aorta root is normal. The pulmonary artery and bifurcation cannot be  assessed. The inferior vena cava was dilated at 2.3 cm without respiratory  variability, consistent with increased right atrial pressure. IVC diameter  >2.1 cm collapsing <50% with sniff suggests a high RA pressure estimated at 15  mmHg or greater.      Pericardium  No pericardial effusion is present.        Compared to Previous Study  No significant changes vs 7/29/2020.  _____________________________________________________________________________  __                          Report approved by: Sudhir Stover 09/02/2020 03:51 PM              _____________________________________________________________________________  __      Blood culture    Specimen: Blood    Left Hand   Result Value Ref Range    Specimen Description Blood Left Hand     Culture Micro No growth after 10 hours    Blood culture    Specimen: Blood    Right Hand   Result Value Ref Range    Specimen Description Blood Right Hand     Culture Micro No growth after 9 hours    Blood culture    Specimen: Blood    Right Hand   Result Value Ref Range    Specimen Description Blood Right Hand     Culture Micro PENDING    Blood culture    Specimen: Blood    Left Hand   Result Value Ref Range    Specimen Description Blood Left Hand     Culture Micro PENDING    Basic metabolic panel   Result Value Ref Range    Sodium 139 133 - 144 mmol/L    Potassium 3.9 3.4 - 5.3 mmol/L    Chloride 106 94 - 109 mmol/L    Carbon Dioxide 28 20 - 32 mmol/L    Anion Gap 4 3 - 14 mmol/L    Glucose 94 70 - 99 mg/dL    Urea Nitrogen 14 7 - 30 mg/dL    Creatinine 0.79 0.66 - 1.25 mg/dL    GFR Estimate >90 >60 mL/min/[1.73_m2]    GFR Estimate If Black >90 >60 mL/min/[1.73_m2]    Calcium 8.5 8.5 - 10.1 mg/dL   CBC with platelets   Result Value Ref Range    WBC 5.5 4.0 - 11.0 10e9/L    RBC Count 3.98 (L) 4.4 - 5.9 10e12/L    Hemoglobin 11.3 (L) 13.3 - 17.7 g/dL    Hematocrit 36.9 (L) 40.0 - 53.0 %    MCV 93 78 - 100 fl    MCH 28.4 26.5 - 33.0 pg    MCHC 30.6 (L) 31.5 - 36.5 g/dL    RDW 17.1 (H) 10.0 - 15.0 %    Platelet Count 198 150 - 450 10e9/L     *Note: Due to a large number of results and/or encounters for the requested time period, some results have not been displayed. A complete set of results can be found in Results Review.

## 2020-09-03 NOTE — CONSULTS
"    Interventional Radiology Consult Service Note    Patient is on IR schedule 9/4 for an image guided placement of a double lumen, tunneled CVC.   Labs WNL for procedure. BCs pending but NGTD.    Orders for NPO have been entered. Pt is currently receiving IV vanco.  Consent will be done prior to procedure.     Please contact the IR [place] RN at [phone] for estimated time of procedure.     Case discussed with Dr. Alvarado. This is a 48 yo M with a history of bariatric surgery on chronic TPN, recurrent CLABSI, short-gut syndrome, and a hx of MRSE bacteremia, strep bacteremia, and corynebacteria bacteremia who presented 9/2 with 2 days of Fever with G+ rods found on blood culture. IR removed RIJ tunneled CVC. Team is now requesting line replacement after 48 hrs of negative blood cultures. This is per ID recommendations.    *Pt and wife would like to discuss placement of silicone line as they have heard this may have less risk of infection. Will be discussed at time of consent.    Vitals:   /74 (BP Location: Left arm)   Pulse 65   Temp 96.8  F (36  C) (Oral)   Resp 18   Ht 1.803 m (5' 11\")   Wt 91.8 kg (202 lb 6.1 oz)   SpO2 99%   BMI 28.23 kg/m      Pertinent Labs:     Lab Results   Component Value Date    WBC 5.5 09/03/2020       Lab Results   Component Value Date    HGB 11.3 09/03/2020    HGB 11.9 09/02/2020    HGB 11.3 09/01/2020       Lab Results   Component Value Date     09/03/2020     09/02/2020     09/01/2020       Lab Results   Component Value Date    INR 1.12 08/03/2020    PTT 26 07/22/2019       Lab Results   Component Value Date    POTASSIUM 3.9 09/03/2020        Patti Garvey DNP, APRN  Interventional Radiology  Pager: [phone]    "

## 2020-09-03 NOTE — PLAN OF CARE
"/74 (BP Location: Left arm)   Pulse 65   Temp 96.8  F (36  C) (Oral)   Resp 18   Ht 1.803 m (5' 11\")   Wt 91.8 kg (202 lb 6.1 oz)   SpO2 99%   BMI 28.23 kg/m      Care from: [id number]      VS & Pain: VSS on room air, prn po Oxy x1 was given for abdominal pain    Neuro: A&O x4    Respiratory: WDL    Cardiac: WDL    Peripheral neurovascular: WDL    GI/: adequate urine output, no BM this shift, last BM was 3 weeks ago per pt \"this is my baseline because of my low oral intake and on TPN\"    Nutrition: NPO, reported nausea- prn po Zofran x1 was given    Skin: old ELISA site is leaking, redness and pain noted at site- changed dressing; bruised; PIV    Musculoskeletal: WDL    Lines: extrasavation with Vanco at L PIV- removed IV, marked border, took photo, applied ice pack, and kept arm elevated; new R PIV is TKO    Activity: Up independently    Events this shift: Pt was awake the whole night, walked around 2nd floor and 5B hallway. Call light within reach.    Plan: Plan to continue IV abx and replace ELISA. Continue to monitor and follow poc.  "

## 2020-09-03 NOTE — PLAN OF CARE
"Blood pressure 119/77, pulse 84, temperature 98  F (36.7  C), temperature source Oral, resp. rate 18, height 1.803 m (5' 11\"), weight 91.8 kg (202 lb 6.1 oz), SpO2 99 %.RA.     Patient A & O X 4,  VSS no respiratory distress noted . Abdominal pain manageable with Oxycodone PRN. G- tube site leaking dressing changed x 2 site reddened . Patient appetite fair . Void adequate and report no bowel movement . Up independent . Plan NPO after MN. For CVC placement at IR tomorrow. Call light in reach. Continue with POC and update MD with change.        "

## 2020-09-03 NOTE — PROGRESS NOTES
GENERAL ID SERVICE: Sign-off     Patient:  Parker Acevedo   Date of birth 1970, Medical record number [id number]  Date of Visit:  09/03/2020  Date of Admission: 8/31/2020  Consult Requester:Eduardo Wolf          Consult Course:   Parker Acevedo is a 50yo man who is TPN dependent has a history of recurrent episodes of bacteremia secondary to central line associated blood stream infection. His Cm was replaced most recently on 8/3 and he reports ongoing drainage from it since placement, which he has not experienced with previous lines.     Patient presented on 8/30 with fever, night sweats, chills, headache, joint pain that started on 8/27 with blood cultures showing G+ rods (later corrected to G+ cocci). ID was consulted on 9/1/2020. Blood cultures indentified growth of S. Lugdunensis. He was started on IV vancomycin. Patient's Cm central line was removed on 9/2/2020 by IR. Patient will have a 48 hour line holiday prior to replacement on Friday. Blood cultures were drawn during the line holiday on 9/2 and 9/3.     At this time we are signing off his care with the following recommendations:  1. Switch Antibiotics to IV Cefazolin 2g Q8H; patient has anaphylactic reaction to penicillins, but has been able to tolerate cefazolin in the past.  2. Place new Cm line on Friday.  3. Repeat TTE prior to discharge to rule out endocarditis which can be a complication of S. lugdunensis.  4. On discharge switch back to IV vancomycin, and discharge with IV vancomycin for 2 weeks (since only IV vancomycin is accepted by his insurance at home).  4. Weekly BUN, Creatinine, and CBC and vancomycin level after discharge for 2 weeks (vancomycin Goal Trough 15-20)  5. Follow-up scheduled on Sept 24th @ 9:30 with Dr. Buckner.      Alfonso Bowling, MS4  Pager: [phone]    Scribe Disclosure:   Alfonso [place], am serving as a scribe; to document services personally performed by Марина Buckner MD - -based  on data collection and the provider's statements to me.     _____________________________________    Consult Question:.  Admission Diagnosis: Gram-positive bacteremia [R78.81]         History of Present Illness:   Patient is a 48 yo M with a history of bariatric surgery on chronic TPN, recurrent CLABSI, short-gut syndrome, and a hx of MRSE bacteremia, strep bacteremia, and corynebacteria bacteremia who presents today with 2 days of Fever wit G+ rods found on blood culture.    Patient states that he has had over 20 episodes of bacteremia over the past 4-5 years. The most recent episode, for which he presented to the hospital this time, started on 8/27. Patient called the [facility] infusion pharmacy b/c of fever, chills, and night-sweats that had occurred for over 2 days. He would check his temperature at home. And it would fluctuate between 100.x up to a Tmax of 101.9. After calling the [facility] infusion pharmacy, he went to have blood cultures drawn per their guidance. On  8/30, the blood cultures came back positive growing G+ rods. Patient was informed to come to the ED for continued management. Per the ED note the blood cultures were positive via peripheral draw (negative x2 from central line port).    Patient lives in [facility] in a home on a 3-acre property. He states he likes to walk in the woods on his property for leisure. Patient often notices ticks, but has not noticed any tick bites. Patient has multiple dogs at home. Patient lives with wife and kids. Nobody in his family has been sick. Patient states his reticular rash on his upper extremity is secondary to his time spent in the hot-tub.    Patient today endorses mild headache with resolved fevers, chills, night sweats, and diarrhea. Prior to hospitalization, patient stated he experienced fevers, chills, night sweats, headache, joint pain, sore throat, palpitations, diarrhea and nausea/vomiting, and abdominal pain that is chronic s/p  surgery. Patient endorsed no chest pain, dyspnea, cough, dizziness, dysuria, or hematuria.    Patient also stated during re-examination that he has noted drainage from his Cm line with clots and granular material.     Summary of Recent Hospitalizations:  8/3/2020 = repeat dual-lumen Cm replaced.  7/28-8/3/20 - treated for MRSE bactermia secondary to central venous catheter; repeat dual-lumen Cm replaced  July 2020 - treated for Corynebacterium infection with Vancomycin  11/4/19 - peripheral cultures negative; but initial cultures grew Corneybacterium and Peptostreptococcus which were deemed contaminants.  10/4/19 - Strep, Staph, and Granulicatella adiacens bactermia; Cm replaced on 10/1  5/16/19 - blood draw from PICC grew Rothia mucilaginosa  1/9/19 - Candidemia sepsis due to PICC line infection.

## 2020-09-03 NOTE — PROGRESS NOTES
CLINICAL NUTRITION SERVICES - ASSESSMENT NOTE     Nutrition Prescription    RECOMMENDATIONS FOR MDs/PROVIDERS TO ORDER:  1. Consider to advance diet to regular in the lite of TPN on hold.    2. Once able to restart TPN, please send pharmacy/Nutrition consult to manage TPN     3. Lyte Monitoring with TPN start and advancement, replace as needed.     4. Avoid IV dextrose from MIVF with start of TPN, decrease or d/c  IVF with TPN start.      Malnutrition Status:    Unable to determine due to unable to perform all aspects of NFPE     Future/Additional Recommendations:  - Once PICC access appropriate to use, begin TPN:    --Dosing wt = 76.4 kg (TPN dosing wt ).     --TPN with Goal volume of 1000 ml/day at continuous rate to start,  with 150 gm Dex daily, 100 gm AA daily, and 100 ml 20% IV lipids 4 days /week.     --(Regimen provides average daily 1024 kcals (13 kcals/kg + pending PO), 1.3 gm PRO/kg, 150 gm dextrose with GIR 1.4  mg/kg/min and 11% kcals from Fat.     --Once tolerated TPN without sign of refeeding x 24 hours begin 14 hour overnight cycle per home regimen.        REASON FOR ASSESSMENT  Parker Acevedo is a/an 49 year old male assessed by the dietitian for Admission Nutrition Risk Screen for tube feeding or parenteral nutrition    Chart reviewed:  Admitted for bacteremia, Central line pulled.   PMH: bariatric surgery on chronic TPN, recurrent CLABSI, gastric ulcer, short gut syndrome, Cardiomyopathy with reduced EF.     --Presented for 2 days of fever and found to have gram positive rods in blood culture    --Recent admission with MRSE bacteremia due to azevedo infection    NUTRITION HISTORY  --Visited with patient. Patient reports TPN dependent. He is on cycle 14 hour night time regiment. His po intake is minimal and has diarrhea when eats more foods. Per patient, his last TPN was the day prior to admission on 8/30.     Obtained from chart review:   --On chronic TPN at home for the past 4-5 years due  "to history of Gastric bypass. Has persistent Nausea.      TPN regimen: Obtained  from [facility] over the last admission:   Runs Two bags (PN + lipids M/W/F) and (PN without lipids T//S/Alicea)     Bag # 1 (PN with lipids M/W/F):  Dosing wt: 76.4 kg, Volume 1100 ml, 14 ours cycle, 100 ml overfield   Dextrose 150 gm,  gms with 125 ml 20% intralipid's  MTE-5 1ml/day, infuvite 10 ml daily      - Goal TPN Provides: 1160 kcals/day (15 kcal/kg/day + Pending PO calories ), 1.3 gm PRO/kg/day, 150 gm CHO/day, GIR: 2.74  mg/kg/min peak infusion and 22% of kcals from IV lipids.      Bag #2 (PN without Lipids T/Th/Sa/Alicea)  Dosing wt: 76.4 kg, Volume 1000 ml, 14 hour cycle + 100 ml over field   Dextrose 150 gm,  gms, No lipids   - Goal TPN Provides: 910 kcals/day (12 kcal/kg/day + Pending PO calories ), 1.3 gm PRO/kg/day, 150 gm CHO/day, GIR: 2.74  mg/kg/min peak infusion and no fat contribution.      CURRENT NUTRITION ORDERS  Diet: NPO for procedure / azevedo removal in IR  Intake/Tolerance: remains NPO since admit 2-3 days , plan for diet advancement to regular post procedure today     LABS  K+: 3.7, Mag: N/A, Phos: N/A  T (on ) and normal, Alk phos: 69, ALT: 25, AST:12 - all within normal range  BUN: 11, Cr: 0.68  Albumin: 3.2    MEDICATIONS  Vitamin D, B12,     ANTHROPOMETRICS  Height: 180.3 cm (5' 11\")  Most Recent Weight: 91.8 kg (202 lb 6.1 oz)    IBW: 78.2 kg ( 117% IBW)  BMI:28.23 kg/m2  Overweight BMI 25-29.9  Weight History: 1.1 Kg wt loss over the past week (1.2% net wt loss    Wt Readings from Last 10 Encounters:   20 91.8 kg (202 lb 6.1 oz)   20 92.9 kg (204 lb 14.4 oz)   20 93.8 kg (206 lb 14.4 oz)   20 82.1 kg (181 lb)   20 97.3 kg (214 lb 9.6 oz)   20 90.3 kg (199 lb)   20 78.9 kg (174 lb)   19 86.6 kg (191 lb)   19 78.9 kg (174 lb)   19 90.7 kg (200 lb)       Dosing Weight: Will continue with TPN dosing wt of 76 kg     ASSESSED NUTRITION " NEEDS  Estimated Energy Needs: 7215-8388 kcals/day (20 - 25 kcals/kg)  Justification: Modest needs with TPN   Estimated Protein Needs:  grams protein/day (1.2 - 1.5 grams of pro/kg)  Justification: Repletion  Estimated Fluid Needs:History of Cardiomyopathy with reduced EF.  Justification: Per provider pending fluid status    PHYSICAL FINDINGS  No peripheral edema    MALNUTRITION   % Intake: No TPN 3 days now   % Weight Loss: Up to 1-2% in 1 week (non-severe)  Subcutaneous Fat Loss: None observed  Muscle Loss: Mild temporal wasting   Fluid Accumulation/Edema: None noted  Malnutrition Diagnosis: Non-severe malnutrition in the context of acute illness    NUTRITION DIAGNOSIS  Inadequate parenteral nutrition infusion related to Bacteremia as evidenced by TPN dependent and now is on hold since 2 days ago and pending ability to restart TPN     INTERVENTIONS  Implementation  Nutrition Education: Discussed RD role in nutrition POC, TPN restart  Parenteral Nutrition/IV Fluids - Rec above      Goals  Total avg nutritional intake to meet a minimum of 20 kcal/kg and 1.2 gm PRO/kg daily (per dosing wt 76 kg).     Monitoring/Evaluation  Progress toward goals will be monitored and evaluated per protocol.    Gautam Mckeon RD/BLANKA  Pager [phone]

## 2020-09-04 ENCOUNTER — APPOINTMENT (OUTPATIENT)
Dept: INTERVENTIONAL RADIOLOGY/VASCULAR | Facility: CLINIC | Age: 50
DRG: 315 | End: 2020-09-04
Attending: NURSE PRACTITIONER
Payer: COMMERCIAL

## 2020-09-04 ENCOUNTER — MYC MEDICAL ADVICE (OUTPATIENT)
Dept: PALLIATIVE MEDICINE | Facility: CLINIC | Age: 50
End: 2020-09-04

## 2020-09-04 ENCOUNTER — TELEPHONE (OUTPATIENT)
Dept: INFECTIOUS DISEASES | Facility: CLINIC | Age: 50
End: 2020-09-04

## 2020-09-04 ENCOUNTER — PATIENT OUTREACH (OUTPATIENT)
Dept: CARE COORDINATION | Facility: CLINIC | Age: 50
End: 2020-09-04

## 2020-09-04 ENCOUNTER — HOME INFUSION (PRE-WILLOW HOME INFUSION) (OUTPATIENT)
Dept: PHARMACY | Facility: CLINIC | Age: 50
End: 2020-09-04

## 2020-09-04 VITALS
TEMPERATURE: 98 F | HEIGHT: 71 IN | OXYGEN SATURATION: 99 % | RESPIRATION RATE: 16 BRPM | WEIGHT: 199.2 LBS | BODY MASS INDEX: 27.89 KG/M2 | DIASTOLIC BLOOD PRESSURE: 84 MMHG | SYSTOLIC BLOOD PRESSURE: 128 MMHG | HEART RATE: 77 BPM

## 2020-09-04 DIAGNOSIS — R78.81 POSITIVE BLOOD CULTURE: Primary | ICD-10-CM

## 2020-09-04 LAB
ANION GAP SERPL CALCULATED.3IONS-SCNC: 3 MMOL/L (ref 3–14)
BACTERIA SPEC CULT: NO GROWTH
BUN SERPL-MCNC: 15 MG/DL (ref 7–30)
CALCIUM SERPL-MCNC: 8.9 MG/DL (ref 8.5–10.1)
CHLORIDE SERPL-SCNC: 106 MMOL/L (ref 94–109)
CO2 SERPL-SCNC: 29 MMOL/L (ref 20–32)
CREAT SERPL-MCNC: 0.83 MG/DL (ref 0.66–1.25)
ERYTHROCYTE [DISTWIDTH] IN BLOOD BY AUTOMATED COUNT: 16.7 % (ref 10–15)
GFR SERPL CREATININE-BSD FRML MDRD: >90 ML/MIN/{1.73_M2}
GLUCOSE SERPL-MCNC: 88 MG/DL (ref 70–99)
HCT VFR BLD AUTO: 38.2 % (ref 40–53)
HGB BLD-MCNC: 11.5 G/DL (ref 13.3–17.7)
INR PPP: 1.03 (ref 0.86–1.14)
Lab: NORMAL
MCH RBC QN AUTO: 28.2 PG (ref 26.5–33)
MCHC RBC AUTO-ENTMCNC: 30.1 G/DL (ref 31.5–36.5)
MCV RBC AUTO: 94 FL (ref 78–100)
PLATELET # BLD AUTO: 182 10E9/L (ref 150–450)
POTASSIUM SERPL-SCNC: 3.6 MMOL/L (ref 3.4–5.3)
RBC # BLD AUTO: 4.08 10E12/L (ref 4.4–5.9)
SODIUM SERPL-SCNC: 138 MMOL/L (ref 133–144)
SPECIMEN SOURCE: NORMAL
WBC # BLD AUTO: 6.3 10E9/L (ref 4–11)

## 2020-09-04 PROCEDURE — C1769 GUIDE WIRE: HCPCS

## 2020-09-04 PROCEDURE — 25000125 ZZHC RX 250: Performed by: RADIOLOGY

## 2020-09-04 PROCEDURE — 36592 COLLECT BLOOD FROM PICC: CPT | Performed by: STUDENT IN AN ORGANIZED HEALTH CARE EDUCATION/TRAINING PROGRAM

## 2020-09-04 PROCEDURE — 76937 US GUIDE VASCULAR ACCESS: CPT

## 2020-09-04 PROCEDURE — 99239 HOSP IP/OBS DSCHRG MGMT >30: CPT | Mod: GC | Performed by: INTERNAL MEDICINE

## 2020-09-04 PROCEDURE — 0JH63XZ INSERTION OF TUNNELED VASCULAR ACCESS DEVICE INTO CHEST SUBCUTANEOUS TISSUE AND FASCIA, PERCUTANEOUS APPROACH: ICD-10-PCS | Performed by: RADIOLOGY

## 2020-09-04 PROCEDURE — C1751 CATH, INF, PER/CENT/MIDLINE: HCPCS

## 2020-09-04 PROCEDURE — 25000132 ZZH RX MED GY IP 250 OP 250 PS 637: Performed by: STUDENT IN AN ORGANIZED HEALTH CARE EDUCATION/TRAINING PROGRAM

## 2020-09-04 PROCEDURE — 85610 PROTHROMBIN TIME: CPT | Performed by: STUDENT IN AN ORGANIZED HEALTH CARE EDUCATION/TRAINING PROGRAM

## 2020-09-04 PROCEDURE — 27210732 ZZH ACCESSORY CR1

## 2020-09-04 PROCEDURE — 25000128 H RX IP 250 OP 636: Performed by: STUDENT IN AN ORGANIZED HEALTH CARE EDUCATION/TRAINING PROGRAM

## 2020-09-04 PROCEDURE — 40000556 ZZH STATISTIC PERIPHERAL IV START W US GUIDANCE

## 2020-09-04 PROCEDURE — 27210908 ZZH NEEDLE CR4

## 2020-09-04 PROCEDURE — 85027 COMPLETE CBC AUTOMATED: CPT | Performed by: STUDENT IN AN ORGANIZED HEALTH CARE EDUCATION/TRAINING PROGRAM

## 2020-09-04 PROCEDURE — 36558 INSERT TUNNELED CV CATH: CPT | Mod: LT

## 2020-09-04 PROCEDURE — 99152 MOD SED SAME PHYS/QHP 5/>YRS: CPT

## 2020-09-04 PROCEDURE — 02H633Z INSERTION OF INFUSION DEVICE INTO RIGHT ATRIUM, PERCUTANEOUS APPROACH: ICD-10-PCS | Performed by: RADIOLOGY

## 2020-09-04 PROCEDURE — 80048 BASIC METABOLIC PNL TOTAL CA: CPT | Performed by: STUDENT IN AN ORGANIZED HEALTH CARE EDUCATION/TRAINING PROGRAM

## 2020-09-04 PROCEDURE — 27211193 ZZ H WOUND GLUE CR1

## 2020-09-04 PROCEDURE — 25000125 ZZHC RX 250: Performed by: STUDENT IN AN ORGANIZED HEALTH CARE EDUCATION/TRAINING PROGRAM

## 2020-09-04 PROCEDURE — 25000128 H RX IP 250 OP 636: Performed by: RADIOLOGY

## 2020-09-04 RX ORDER — CEFAZOLIN SODIUM 1 G
2 VIAL (EA) INJECTION EVERY 8 HOURS
Qty: 600 ML | Refills: 0
Start: 2020-09-04 | End: 2020-09-14

## 2020-09-04 RX ORDER — FLUMAZENIL 0.1 MG/ML
0.2 INJECTION, SOLUTION INTRAVENOUS
Status: DISCONTINUED | OUTPATIENT
Start: 2020-09-04 | End: 2020-09-04 | Stop reason: HOSPADM

## 2020-09-04 RX ORDER — HEPARIN SODIUM,PORCINE 10 UNIT/ML
5 VIAL (ML) INTRAVENOUS EVERY 24 HOURS
Status: DISCONTINUED | OUTPATIENT
Start: 2020-09-04 | End: 2020-09-04 | Stop reason: HOSPADM

## 2020-09-04 RX ORDER — HEPARIN SODIUM,PORCINE 10 UNIT/ML
5-10 VIAL (ML) INTRAVENOUS
Status: DISCONTINUED | OUTPATIENT
Start: 2020-09-04 | End: 2020-09-04 | Stop reason: HOSPADM

## 2020-09-04 RX ORDER — CEFAZOLIN SODIUM 1 G/50ML
1750 SOLUTION INTRAVENOUS EVERY 12 HOURS
Qty: 21 EACH | Refills: 0 | COMMUNITY
Start: 2020-09-04 | End: 2020-09-04 | Stop reason: ALTCHOICE

## 2020-09-04 RX ORDER — HEPARIN SODIUM (PORCINE) LOCK FLUSH IV SOLN 100 UNIT/ML 100 UNIT/ML
5 SOLUTION INTRAVENOUS
Status: COMPLETED | OUTPATIENT
Start: 2020-09-04 | End: 2020-09-04

## 2020-09-04 RX ORDER — DEXTROSE MONOHYDRATE 25 G/50ML
25-50 INJECTION, SOLUTION INTRAVENOUS
Status: DISCONTINUED | OUTPATIENT
Start: 2020-09-04 | End: 2020-09-04

## 2020-09-04 RX ORDER — FENTANYL CITRATE 50 UG/ML
25-50 INJECTION, SOLUTION INTRAMUSCULAR; INTRAVENOUS EVERY 5 MIN PRN
Status: DISCONTINUED | OUTPATIENT
Start: 2020-09-04 | End: 2020-09-04 | Stop reason: HOSPADM

## 2020-09-04 RX ORDER — NICOTINE POLACRILEX 4 MG
15-30 LOZENGE BUCCAL
Status: DISCONTINUED | OUTPATIENT
Start: 2020-09-04 | End: 2020-09-04

## 2020-09-04 RX ORDER — NALOXONE HYDROCHLORIDE 0.4 MG/ML
.1-.4 INJECTION, SOLUTION INTRAMUSCULAR; INTRAVENOUS; SUBCUTANEOUS
Status: DISCONTINUED | OUTPATIENT
Start: 2020-09-04 | End: 2020-09-04 | Stop reason: HOSPADM

## 2020-09-04 RX ADMIN — MIDAZOLAM 1 MG: 1 INJECTION INTRAMUSCULAR; INTRAVENOUS at 10:58

## 2020-09-04 RX ADMIN — SUCRALFATE 1 G: 1 SUSPENSION ORAL at 03:05

## 2020-09-04 RX ADMIN — FENTANYL CITRATE 50 MCG: 50 INJECTION, SOLUTION INTRAMUSCULAR; INTRAVENOUS at 10:54

## 2020-09-04 RX ADMIN — OXYCODONE HYDROCHLORIDE 10 MG: 5 SOLUTION ORAL at 06:49

## 2020-09-04 RX ADMIN — CARVEDILOL 6.25 MG: 6.25 TABLET, FILM COATED ORAL at 08:00

## 2020-09-04 RX ADMIN — CEFAZOLIN SODIUM 2 G: 2 INJECTION, SOLUTION INTRAVENOUS at 08:31

## 2020-09-04 RX ADMIN — DEXTROAMPHETAMINE SACCHARATE, AMPHETAMINE ASPARTATE, DEXTROAMPHETAMINE SULFATE AND AMPHETAMINE SULFATE 20 MG: 2.5; 2.5; 2.5; 2.5 TABLET ORAL at 07:59

## 2020-09-04 RX ADMIN — OXYCODONE HYDROCHLORIDE 10 MG: 5 SOLUTION ORAL at 03:05

## 2020-09-04 RX ADMIN — LIDOCAINE HYDROCHLORIDE 30 ML: 20 SOLUTION ORAL; TOPICAL at 06:48

## 2020-09-04 RX ADMIN — LIDOCAINE HYDROCHLORIDE 10 ML: 10 INJECTION, SOLUTION EPIDURAL; INFILTRATION; INTRACAUDAL; PERINEURAL at 11:08

## 2020-09-04 RX ADMIN — CEFAZOLIN SODIUM 2 G: 2 INJECTION, SOLUTION INTRAVENOUS at 00:00

## 2020-09-04 RX ADMIN — FENTANYL CITRATE 50 MCG: 50 INJECTION, SOLUTION INTRAMUSCULAR; INTRAVENOUS at 10:58

## 2020-09-04 RX ADMIN — MIDAZOLAM 1 MG: 1 INJECTION INTRAMUSCULAR; INTRAVENOUS at 10:54

## 2020-09-04 RX ADMIN — Medication 3 ML: at 11:07

## 2020-09-04 RX ADMIN — NICOTINE 1 PATCH: 21 PATCH TRANSDERMAL at 08:00

## 2020-09-04 RX ADMIN — PANTOPRAZOLE SODIUM 40 MG: 40 TABLET, DELAYED RELEASE ORAL at 08:00

## 2020-09-04 ASSESSMENT — ACTIVITIES OF DAILY LIVING (ADL)
ADLS_ACUITY_SCORE: 9

## 2020-09-04 NOTE — PLAN OF CARE
"Blood pressure 128/84, pulse 77, temperature 98  F (36.7  C), temperature source Oral, resp. rate 16, height 1.803 m (5' 11\"), weight 90.4 kg (199 lb 3.2 oz), SpO2 99 %. RA      Patient A & O X 4, VSS no respiratory distress noted. Patient had CVC replaced  at IR . And stable have discharge order to go home . Patient aware and  agreeable with plan. Writer reviewed discharge instructions with patient /Spouse  and instruct to follow up with primary care provider and Patient /Spouse verbalized understanding. Patient to continue TPN and IV abx . Writer asked  if patient has questions patient reply no thank you. Patient left the unit @ 14:30, accompanied by Spouse and patient took all his belonging with.        "

## 2020-09-04 NOTE — PLAN OF CARE
"[id number]    /83 (BP Location: Left arm)   Pulse 71   Temp 98.1  F (36.7  C) (Oral)   Resp 16   Ht 1.803 m (5' 11\")   Wt 91.8 kg (202 lb 6.1 oz)   SpO2 100%   BMI 28.23 kg/m      Reason for admission: Admitted after approximately 2 days of fever and subsequently found to have a gram positive rods on blood culture after recent MRSE bacteremia 2/2 Cm CVC infection.  Activity: UAL  Pain: Pt reporting abdominal pain, given PRN Oxycodone x1 with effect. Fentanyl patch in place.   Neuro: AxOx4. Neuros intact.   Cardiac: WDL. VSS.   Respiratory: Non labored breathing on RA. O2 sats WDL.   GI/: Abdomen soft, nd, tender to previous GT site. Scarce BMs per pt d/t small PO intake. Voiding spontaneously in bathroom.   Diet: Regular diet, poor appetite. NPO at midnight for procedure IR CVC placement tomorrow.   Lines: PIV intact, site WDL.   Wounds: Previous GT site dressing cleaned and changed x2. Moderate green yellow output.   Labs/imaging: Reviewed. See chart.     Continue to monitor and follow POC    "

## 2020-09-04 NOTE — TELEPHONE ENCOUNTER
Information added to other open encounters related to management of opioid.     YADIRA CrookN, RN-BC  Patient Care Supervisor  [facility] [place]

## 2020-09-04 NOTE — PROGRESS NOTES
Care Coordinator - Discharge Planning    Admission Date/Time:  8/31/2020  Attending MD:  Eduardo Wolf, *     Data  Date of initial CC assessment:  9/1/2020  Chart reviewed, discussed with interdisciplinary team.   Patient was admitted for:   1. Bacteremia    2. Gram-positive bacteremia    3. Exposure to SARS-associated coronavirus    4. S/P bariatric surgery         Assessment   Full assessment completed in previous note    Coordination of Care and Referrals:   Per interdisciplinary rounds, patient will have a azevedo port placed this morning and will likely be medically ready for discharge today. [facility] updated, patient will need delivery of meds today. [facility] updated. Patient's spouse will provide transportation at discharge. IMM completed.     [facility] (Chronic TPN, IV hydration, IV abx)  Phone: [phone]  Fax: [phone]     [facility] (Skilled nursing visits)  Phone: [phone]  Fax: [phone].       Plan  Anticipated Discharge Date:  9/4/2020  Anticipated Discharge Plan:  Home w/resumption of home infusion and home care services.     Miri Cooper, RNCC, BSN    [facility]  [address] [place] [id number]    [email or web address]    Office: [phone] Pager: [phone]  To contact the weekend RNCC, page [phone].

## 2020-09-04 NOTE — PROGRESS NOTES
Clinic Care Coordination Contact    Situation: Patient chart reviewed by care coordinator.    Background: RN CC reviewing chart for follow up.    Assessment: Patient is currently inpatient at [facility] for Bacteremia, gram-positive bacteremia, exposure to SARS associated coronavirus and s/p bariatric surgery.    Plan/Recommendations: RN CC will monitor for hospital discharge.    Melissa Behl BSN, RN, PHN, CCM  Primary Care Clinical RN Care Coordinator  [facility]   [phone]

## 2020-09-04 NOTE — PLAN OF CARE
Patient continues to c/o abd pain. Oxycodone given x1. Old G-tube dressing changed x2. Up independently and ambulating around unit. Has been NPO all night for CVC placement in IR this morning. Continue to assist and follow plan of care.

## 2020-09-04 NOTE — TELEPHONE ENCOUNTER
Discussed antibiotic orders for Parker for discharge home.  Plan was to change from in-patient cefazolin to vancomycin for discharge as it was believed vanco was the only option covered by his insurance. I able to obtain authorization for cefazolin, so this will be covered as well.    TORB per Dr. Buckner, change vancomycin to cefazolin 2gm IV every 8hrs with LOT 9/14/20.      Lab monitoring is included with his weekly TPN lab monitoring.      Jyothi Peraza, PharmD [facility]   Ph: [phone]  Fax: [phone]

## 2020-09-04 NOTE — PROGRESS NOTES
[facility], [place]    Progress Note - Maroon 2 Service   Date of Admission:  8/31/2020    Assessment & Plan    Mr. Acevedo is a 50yo M with PMHx notable for s/p bariatric surgery on chronicTPN, recurrent CLABSI, gastric ulcer, short gut syndrome, here after approximately 2 days of fever and subsequently found to have a gram positive rods on blood culture after recent MRSE bacteremia 2/2 Cm CVC infection.      Changes today:  - NPO for IR to replace CVC tmr  - Follow-up cultures   - Cefazolin while inpatient, vanc on discharge  - Diet as tolerated    # Staph lugdunensis bacteremia  # Gram Positive Rods & gram negative coccobacilli bacteremia   # Hx recurrent polymicrobial bacteremia  Pt admitted 7/28/2020 for 1 week of fevers, chills, and night sweats, concerning for central line infection and bacteremia. Had MRSE from Cm CVC.  Bcx cleared except Cm port removed by IR on 7/30/2020, port tip with staph epi consistent with blood cultures from port.  Repeat dual-lumen Cm replaced on 8/3/2020. Completed 7d IV vancomycin from date of port reevaluation (7/30-8/6). Has hx of Streptococcus and Staph Epidermidis bacteremia. He is presenting now with gram positive rods and gram negative cocci, will follow cultures. TTE without signs of endocarditis.  - ID consulted, appreciate recs   - IV cefazolin (9/3-)   - IV vanc on discharge   - Discontinue IV vancomycin (8/31-9/3)   - PO benadryl given with vancomycin   - Replace line in AM  - Diet as tolerated, hold off on TPN  - Cultures:   - 9/3 L and R hand: NGTD   - 9/2 L hand and port: NGTD   - 9/1 L hand x2: NGTD   - 8/31 port: staph lugdunensis    - 8/31 L arm: NGTD    - 8/29 R hand: gram + rods and gram - coccobacilli              - 8/28 port x2: NGTD    # Hx Celestino-en-Y gastric bypass, esophagojejunostomy c/b short gut syndrome and chronic malnutrition  # Chronic Abdominal Pain  ELISA KEY tube fell out prior to arrival - pt uses  to vent. Will need to be replaced.  - Patient would like to follow-up OP for replacement of Gtube, has this arranged already  - Hold TPN for now  - Trial eating as tolerated  - GI cocktail prn, sucralfate   - Continue cyanocobalamin, vitamin D2   - Pain management:    - pta acetaminophen, fentanyl, oxycodone    - oxycodone increased to TID during admission (Pain Management provider already refilled his home narcotics for discharge)     #HTN: continue coreg  # Asthma: continue albuterol inhaler  # ADHD: continue adderall   # Anxiety: continue lorazepam prn once daily for TPN, medication administration   # GERD: continue pta PPI   # Normocytic anemia: may be 2/2 iron deficiency anemia vs. anemia of chronic disease. Previous iron levels have been wnl.      Diet: Diet as tolerated  VTE ppx: Mechanical  Lines/Devices/Access: none  Code Status: Full code  Disposition: pending     Disposition Plan   Expected discharge: 2 - 3 days, recommended to prior living arrangement once antibiotic plan established.  Entered: Andrew Alvarado MD 09/03/2020, 8:40 PM     The patient's care was discussed with the Attending Physician, Dr. Wolf.    Andrew Alvarado MD  [address], [place]  Pager: [phone]  Please see sticky note for cross cover information  ______________________________________________________________________    Interval History   NAEO. Pt doing well this AM. No complaints.     Data reviewed today: I reviewed all medications, new labs and imaging results over the last 24 hours. I personally reviewed no images or EKG's today.    Physical Exam   Vital Signs: Temp: 98  F (36.7  C) Temp src: Oral BP: 119/77 Pulse: 84   Resp: 18 SpO2: 99 % O2 Device: None (Room air)    Weight: 202 lbs 6.12 oz  Constitutional: walking around room in no acute distress,   Respiratory: No increased work of breathing, good air exchange, clear to auscultation bilaterally, no crackles or  wheezing  Cardiovascular: RRR, pulses 2+  GI: soft, non tender, non distended; covered with clean, dry dressing   Skin: no port in place, dressing without erythema  Musculoskeletal: no peripheral edema, extremities are warm and well perfused   Neurologic: alert and oriented, conversing appropriately    Data   Recent Labs   Lab 09/03/20  0633 09/02/20  0559 09/01/20  0550 08/31/20  1822   WBC 5.5 8.6 7.6 9.2   HGB 11.3* 11.9* 11.3* 11.9*   MCV 93 92 92 91    194 192 220    139  --  141   POTASSIUM 3.9 3.7  --  4.0   CHLORIDE 106 106  --  112*   CO2 28 29  --  26   BUN 14 11  --  13   CR 0.79 0.68  --  0.63*   ANIONGAP 4 4  --  3   SILAS 8.5 8.4*  --  8.1*   GLC 94 97  --  82   ALBUMIN  --   --   --  3.2*   PROTTOTAL  --   --   --  6.5*   BILITOTAL  --   --   --  0.3   ALKPHOS  --   --   --  69   ALT  --   --   --  25   AST  --   --   --  12     No results found for this or any previous visit (from the past 24 hour(s)).  Medications       amphetamine-dextroamphetamine  20 mg Oral Daily     carvedilol  6.25 mg Oral BID w/meals     ceFAZolin  2 g Intravenous Q8H     [START ON 9/7/2020] cyanocobalamin  1,000 mcg Intramuscular Q30 Days     diphenhydrAMINE  25 mg Oral Q8H     fentaNYL  25 mcg Transdermal Q48H     fentaNYL   Transdermal Q8H     lidocaine viscous 2% 15 mL and maalox/mylanta w/ simethicone 15 mL (GI COCKTAIL)  30 mL Oral At Bedtime     nicotine  1 patch Transdermal Daily     nicotine   Transdermal Q8H     pantoprazole  40 mg Oral Daily     sodium chloride (PF)  3 mL Intracatheter Q8H     [START ON 9/6/2020] vitamin D2  50,000 Units Oral Weekly

## 2020-09-04 NOTE — PRE-PROCEDURE
GENERAL PRE-PROCEDURE:   Procedure:  Tunneled CVC    Written consent obtained?: Yes    Risks and benefits: Risks, benefits and alternatives were discussed    Consent given by:  Patient  Patient states understanding of procedure being performed: Yes    Patient's understanding of procedure matches consent: Yes    Procedure consent matches procedure scheduled: Yes    Expected level of sedation:  Moderate  Appropriately NPO:  Yes  ASA Class:  Class 3- Severe systemic disease, definite functional limitations  Mallampati  :  Grade 2- soft palate, base of uvula, tonsillar pillars, and portion of posterior pharyngeal wall visible  Lungs:  Lungs clear with good breath sounds bilaterally  Heart:  Normal heart sounds and rate  History & Physical reviewed:  History and physical reviewed and no updates needed  Statement of review:  I have reviewed the lab findings, diagnostic data, medications, and the plan for sedation

## 2020-09-04 NOTE — TELEPHONE ENCOUNTER
From: Parker Acevedo      Created: 8/29/2019 3:36 PM        On the fentanyl patch is set for every 3 days when it use to be every 2 days???          From: Parker Acevedo      Created: 8/29/2019 3:25 PM        I'm still going to need the oxycodone for strong break through pain which I think is smart and fair I have very serious pain issues and I'm almost certain it will break through the small dose of fentanyl I am willing to keep the oxycodone at 5 - 10 ml  doses at a max of 20 to 30 ml day only to use for break through pain other wise the pain pump would be the next move and I'm willing to quit the Ativant if that helps thank you any questions feel free to call us thank you           04-Sep-2020 19:06

## 2020-09-04 NOTE — PROCEDURES
[facility], [place]    Procedure: IR Procedure Note    Date/Time: 9/4/2020 11:12 AM  Performed by: Hussein Urbina MD  Authorized by: Hussein Urbina MD     UNIVERSAL PROTOCOL   Site Marked: NA  Prior Images Obtained and Reviewed:  Yes  Required items: Required blood products, implants, devices and special equipment available    Patient identity confirmed:  Verbally with patient, arm band, provided demographic data and hospital-assigned identification number  Patient was reevaluated immediately before administering moderate or deep sedation or anesthesia  Confirmation Checklist:  Patient's identity using two indicators, relevant allergies, procedure was appropriate and matched the consent or emergent situation and correct equipment/implants were available  Time out: Immediately prior to the procedure a time out was called    Universal Protocol: the Joint Commission Universal Protocol was followed    Preparation: Patient was prepped and draped in usual sterile fashion           ANESTHESIA    Anesthesia: Local infiltration  Local Anesthetic:  Lidocaine 1% without epinephrine      SEDATION    Patient Sedated: Yes    Sedation Type:  Moderate (conscious) sedation  Vital signs: Vital signs monitored during sedation    Fluoroscopy Time: 1 minute(s)  See dictated procedure note for full details.  Findings: -Successful placement of a 24.5 cm RIJ tunneled CVC.     Specimens: none    Complications: None    Condition: Stable    Plan: -CVC ready for immediate use.     PROCEDURE   Patient Tolerance:  Patient tolerated the procedure well with no immediate complications    Length of time physician/provider present for 1:1 monitoring during sedation: 10

## 2020-09-04 NOTE — DISCHARGE SUMMARY
[facility], [place]  Discharge Summary - Medicine & Pediatrics       Date of Admission:  8/31/2020  Date of Discharge:  9/4/2020  2:59 PM  Discharging Provider: Dr Eduardo Wolf   Discharge Service: Dez Fuchs    Discharge Diagnoses   Staph lugdunensis bacteremia, CVC associated infection   H/o recurrent line-associated bacteremia   Hx Celestino-en-Y gastric bypass, esophagojejunostomy c/b short gut syndrome and chronic malnutrition  Nonsevere malnutrition in the setting of acute illness   Chronic abdominal pain   HTN   Asthma  ADHD  Anxiety   GERD   Normocytic anemia of chronic disease     Follow-ups Needed After Discharge   Follow-up Appointments     Adult [facility] Follow-up and recommended labs and tests      Follow up with Dr. Buckner , at Infectious Disease Clinic, on 9/24 @ 930AM      Appointments on [place] and/or [facility] (with [facility] or [facility]   provider or service). Call [phone] if you haven't heard regarding   these appointments within 7 days of discharge.          Plan for weekly CBC, BUN, Cr and Vancomycin level while on IV Vancomycin to be faxed to ID Clinic at [phone]    Unresulted Labs Ordered in the Past 30 Days of this Admission     Date and Time Order Name Status Description    9/2/2020 2359 Blood culture Preliminary     9/2/2020 2359 Blood culture Preliminary     9/2/2020 1659 Blood culture Preliminary     9/2/2020 1659 Blood culture Preliminary     9/2/2020 0001 Blood culture Preliminary     9/1/2020 0944 Blood culture Preliminary     9/1/2020 0500 Blood culture Preliminary     8/31/2020 1754 Blood culture Preliminary     8/31/2020 1754 Blood culture Preliminary     8/29/2020 1330 BLOOD CULTURE Preliminary       These results will be followed up by Infectious Disease Provider     Discharge Disposition   Discharged to home  Condition at discharge: Good    Hospital Course   Mr. Acevedo is a 50yo M with PMHx notable for s/p bariatric surgery on chronicTPN,  recurrent CLABSI, gastric ulcer, short gut syndrome, and recent MRSE bacteremia 2/2 Cm CVC infection admitted on 8/31 with line associated Staph lugdunensis bacteremia.     Staph lugdunensis bacteremia   H/o recurrent line-associated bacteremia   Hx Celestino-en-Y gastric bypass, esophagojejunostomy c/b short gut syndrome and chronic malnutrition on TPN  Pt admitted 7/28/2020 with MRSE from Cm CVC.  Bcx cleared, repeat dual-lumen Cm replaced on 8/3/2020. Completed 7d IV vancomycin from date of port reevaluation (7/30-8/6). Admitted again 8/31 with cultures positive for Staph lugdunensis bacteremia. Cm removed and >48hours with negative cultures before replacing. Were not able to place silicone line this admission. Discharged with plan for 14 day course of IV Vancomycin and follow up with ID.     Hx Celestino-en-Y gastric bypass, esophagojejunostomy c/b short gut syndrome and chronic malnutrition on TPN  Nonsevere malnutrition in the setting of acute illness   Chronic Abdominal Pain  Patient TPN held briefly while inpatient but he should continue upon discharge and follow up with prescribing provider. His Gtube fell out prior to admission and although we offered to replace this while inpatient, he preferred to have it replaced outpatient and already had this arranged. Discharged on home pain regimen without changes.     **No other changes were made to the pateint's home medical regimens apart from those listed above.     Consultations This Hospital Stay   PHARMACY TO DOSE VANCO  MEDICATION HISTORY IP PHARMACY CONSULT  PHARMACY/NUTRITION TO START AND MANAGE TPN  SMOKING CESSATION PROGRAM IP CONSULT  INFECTIOUS DISEASE GENERAL ADULT IP CONSULT  INTERVENTIONAL RADIOLOGY ADULT/PEDS IP CONSULT  INTERVENTIONAL RADIOLOGY ADULT/PEDS IP CONSULT  VASCULAR ACCESS CARE ADULT IP CONSULT  VASCULAR ACCESS CARE ADULT IP CONSULT  INTERVENTIONAL RADIOLOGY ADULT/PEDS IP CONSULT  VASCULAR ACCESS CARE ADULT IP CONSULT  VASCULAR  ACCESS CARE ADULT IP CONSULT  PHARMACY TO DOSE VANCO    Code Status   Full Code     The patient was discussed with the attending physician Dr. Luciano Cutler MD  [place] [id number] [facility]  Pager: [phone]  ______________________________________________________________________    Physical Exam   Vital Signs: Temp: 98  F (36.7  C) Temp src: Oral BP: 128/84 Pulse: 77   Resp: 16 SpO2: 99 % O2 Device: None (Room air)    Weight: 199 lbs 3.2 oz  Constitutional: Walking around room in no acute distress, well appearing   Respiratory: No increased work of breathing, good air exchange, clear to auscultation bilaterally, no crackles or wheezing  Cardiovascular: RRR, pulses 2+  GI: Soft, non tender, non distended; gtube site with mild erythema and minor skin breakdown irritation, covered with barrier cream  Skin: No port in place, dressing without erythema  Musculoskeletal: No peripheral edema, extremities are warm and well perfused   Neurologic: Alert and oriented, conversing appropriately      Primary Care Physician   Chuy Isaac    Discharge Orders      Home infusion referral      Home care nursing referral      Reason for your hospital stay    You were hospitalized for an infection of your line. You should remain on the Vancomycin 1750mg every 12 hours thorugh 9/14. You will have follow up with Infectious Disease on 9/24 and you should have weekly CBC, BUN, Cr labs while on the Vancomycin. Results can be faxed to [phone].     Adult [facility] Follow-up and recommended labs and tests    Follow up with Dr. Buckner , at Infectious Disease Clinic, on 9/24 @ 930AM    Appointments on [place] and/or [facility] (with [facility] or [facility] provider or service). Call [phone] if you haven't heard regarding these appointments within 7 days of discharge.     Activity    Your activity upon discharge: activity as tolerated     Wound care and dressings    Instructions to care  for your wound at home: please apply a barrier cream (like Vaseline) to your Gtube site until follow up for replacement to prevent skin breakdown     Diet    Follow this diet upon discharge:      Regular diet as tolerated       Significant Results and Procedures   Most Recent 3 CBC's:  Recent Labs   Lab Test 09/04/20  0459 09/03/20  0633 09/02/20  0559   WBC 6.3 5.5 8.6   HGB 11.5* 11.3* 11.9*   MCV 94 93 92    198 194     Most Recent 3 BMP's:  Recent Labs   Lab Test 09/04/20  0459 09/03/20  0633 09/02/20  0559    139 139   POTASSIUM 3.6 3.9 3.7   CHLORIDE 106 106 106   CO2 29 28 29   BUN 15 14 11   CR 0.83 0.79 0.68   ANIONGAP 3 4 4   SILAS 8.9 8.5 8.4*   GLC 88 94 97     Most Recent 2 LFT's:  Recent Labs   Lab Test 08/31/20  1822 08/17/20  0430   AST 12 12   ALT 25 16   ALKPHOS 69 65   BILITOTAL 0.3 0.2   ,   Results for orders placed or performed during the hospital encounter of 08/31/20   XR Chest Port 1 View    Narrative    Exam:  Chest X-ray 8/31/2020 6:45 PM    History: Fever    Comparison: X-ray 7/28/2020    Findings: Frontal radiograph of the chest. Right chest wall  Port-A-Cath with the tip terminating over the cavoatrial junction. The  trachea is midline. Stable cardiac mediastinal silhouette. No pleural  effusion. The pulmonary vessels are distinct. No focal pulmonary  opacity or pneumothorax. The upper abdomen is unremarkable. No acute  bone findings.      Impression    Impression: Clear lungs.    I have personally reviewed the examination and initial interpretation  and I agree with the findings.    CLEVE MARTIN MD   POC US ECHO LIMITED    Impression    Limited Bedside Cardiac Ultrasound, performed and interpreted by me.   Indication: Bacteremia  Parasternal long axis, parasternal short axis, apical 4 chamber and subcostal views were acquired.   Image quality was satisfactory.    Findings:    Global left ventricular function appears intact.  Chambers do not appear dilated.  There is  no evidence of free fluid within the pericardium.    IMPRESSION: Grossly normal left ventricular function and chamber size.  No pericardial effusion.  No gross valvular regurgitation visualized.   IR CVC Tunnel Removal Right    Narrative    Procedure date: 9/2/2020.     Procedure: Tunneled central venous catheter removal.     History: The patient had a  right internal jugular tunneled central  venous catheter which had been used for long-term venous access.  Patient now admitted with bacteremia, and catheter removal was  requested.    Preop diagnosis: Bacteremia.    Postop diagnosis: Same.    : Best Enriquez PA-C.    Assist: None.    Medications: 1% lidocaine, 5 cc subcutaneously.    Face-to-face sedation time: None.    Nursing: Patient was monitored by IR nursing staff under my  supervision, and remained stable throughout the procedure.    Findings: Physical examination demonstrated no warmth, erythema,  fluctuance, discharge, or tenderness at the catheter exit site. The  catheter entry site was prepped and draped in usual sterile fashion.  The retention sutures were cut. Following infiltration around the  catheter with 1% lidocaine, traction was used to free the cuff from  the subcutaneous tissues. The catheter was then removed intact and  without difficulty. Pressure was applied over the venotomy site as  well as along the tract until hemostasis was achieved. The catheter  tip was transected and sent for culture. Site was cleansed and dressed  with a sterile bandage. The procedure was well tolerated, with no  immediate complications. The patient was returned to the inpatient  care unit in stable condition.    Fluoroscopy time: None.    Estimated blood loss: Less than 1 cc.    Specimen: Catheter tip sent for culture      Impression    Impression: right IJ, 9.5 [place] dual-lumen tunneled central venous  catheter removed intact and without difficulty. Lab results pending.    Plan: Patient to remain upright  for 2 hours. No strenuous activity for  3 days. Keep site clean, dry, and covered for 48 hours.  Change the  dressing if it becomes soiled, wet, or blood soaked.  After 48 hours  the dressing may be removed and the site may be left uncovered and may  get wet if the site has healed.  If the site has not healed keep it  covered and dry with daily dressing changes until healed.  Monitor the  supraclavicular site for swelling, as well as the catheter tract and  exit site for bleeding or infection as instructed.  Phone  Interventional Radiology if signs or symptoms develop.  If the site  bleeds, sit upright and hold pressure on the site for 10 minutes.  If  it continues to bleed, continue holding pressure and phone the  Interventional Radiology.    Approximately 10 minutes of direct face-to-face time occurred with the  patient during this encounter.    Attestation: The physician assistant (PA) who performed this procedure  and signed the above report is licensed to practice in the [facility] pursuant to [place] Statute 147A.09.  This includes meeting the  Statute and [place] Board of Medical [employer] requirement of an  active Delegation Agreement, which documents delegation of services by  primary and alternate supervising physicians. All services rendered  are performed under a collaborative agreement with Dr. Stas Colindres, Director of Interventional Radiology, [facility].    ROXY PRITCHETT PA-C   IR CVC Tunnel Placement > 5 Yrs of Age    Narrative    PROCEDURES:  1. Ultrasound guidance for venous access  2. Placement centrally inserted catheter (age > 5 yrs.) tunneled, no  port, no pump  3. Fluoroscopic guidance placement    Clinical History: Patient requires multiple medications. Central line  requested.    Comparisons: Chest x-ray from 8/31/2020.    Staff Radiologist: PERNELL Urbina    Sedation Time: 12 minutes. 100 micrograms Fentanyl and 2 mg Versed  given intravenously for sedation.       Medications: The patient was placed on continuous monitoring.  Intravenous sedation was administered. Vital signs and sedation  monitored by nursing staff under Interventional Radiologists  supervision. The patient remained stable throughout the procedure.    Fluoroscopy time: Less than 1 minute.    PROCEDURE: The patient understood the limitations, alternatives, and  risks of the procedure and requested the procedure be performed. Both  written and oral consent were obtained.    Limited jugular ultrasound documented jugular vein patency.    The right neck and upper chest were prepped and draped in the usual  sterile fashion. Ten to one volume mixture of 1% lidocaine without  epinephrine buffered with 8.4% bicarbonate solution was used for local  anesthesia.     Under ultrasound guidance, right internal jugular venotomy was made  with a micropuncture needle. Permanent copy of the image documenting  the vein was entered into the patients record.    Micropuncture needle exchanged over guidewire for the micropuncture  sheath under fluoroscopic guidance. Catheter length measured with the  0.018 guidewire. Micropuncture sheath saline locked. A 24.5 cm tip to  cuff 14 0.5 [place]zeri dual-lumen Cm catheter was subcutaneously  tunneled from the right anterior chest to the right internal jugular  venotomy site. Micropuncture sheath exchanged over guidewire for the  peel-away sheath. Guidewire removed. Under fluoroscopic guidance, the  catheter placed through a peel-away sheath and positioned with its tip  in the right atrium. Both lumens flushed and aspirated adequately.  Each lumen heparin locked with 1000:1 heparin solution. 2-0 nylon  catheter retaining suture and sterile dressing applied. Right internal  jugular venotomy site closed with Dermabond. No immediate  complication.      Impression    IMPRESSION:   1. Ultrasound guided right internal jugular venotomy.  2. 24.5 cm tip to cuff 9.5 Azeri double-lumen Cm  catheter placed  under fluoroscopic guidance with the tip in the right atrium.     PLAN:  Both lumens heparin locked and ready for use.    I, MIGNON TAMAYO MD, attest that I was present in the procedure room  for the entire procedure.    MIGNON TAMAYO MD   Echo Complete    Narrative    [id number]  [id number]  [id number]  [id number]^EMANUEL^ADEEL^STEPHEN           [facility],[place]  Echocardiography Laboratory  [address] [place] [address]     Name: SARA HASSAN  MRN: [id number]  : 1970  Study Date: 2020 02:51 PM  Age: 49 yrs  Gender: Male  Patient Location: [facility]  Reason For Study: Endocarditis  Ordering Physician: ADEEL CASTELLANOS  Performed By: Earlene Lux RDCS     BSA: 2.1 m2  Height: 71 in  Weight: 202 lb  HR: 73  BP: 116/77 mmHg  _____________________________________________________________________________  __        Procedure  Echocardiogram with two-dimensional, color and spectral Doppler performed.  _____________________________________________________________________________  __        Interpretation Summary  No evidence of vegetations to suggest endocarditis.  Left ventricular function, chamber size, wall motion, and wall thickness are  normal.The EF is 55-60%. Left ventricular diastolic function is not  assessable.  Right ventricular function, chamber size, wall motion, and thickness are  normal.  No significant changes.  _____________________________________________________________________________  __        Left Ventricle  Left ventricular function, chamber size, wall motion, and wall thickness are  normal.The EF is 55-60%. Left ventricular diastolic function is not  assessable. No regional wall motion abnormalities are seen.     Right Ventricle  Right ventricular function, chamber size, wall motion, and thickness are  normal.     Atria  Both atria appear normal.     Mitral Valve  The mitral valve is normal.        Aortic Valve  Aortic  valve is normal in structure and function. No aortic regurgitation is  present.     Tricuspid Valve  The tricuspid valve is normal. Pulmonary artery systolic pressure cannot be  assessed. The peak velocity of the tricuspid regurgitant jet is not  obtainable.     Pulmonic Valve  The pulmonic valve is normal.     Vessels  The aorta root is normal. The pulmonary artery and bifurcation cannot be  assessed. The inferior vena cava was dilated at 2.3 cm without respiratory  variability, consistent with increased right atrial pressure. IVC diameter  >2.1 cm collapsing <50% with sniff suggests a high RA pressure estimated at 15  mmHg or greater.     Pericardium  No pericardial effusion is present.        Compared to Previous Study  No significant changes vs 7/29/2020.  _____________________________________________________________________________  __                          Report approved by: Sudhir Stover 09/02/2020 03:51 PM              _____________________________________________________________________________  __        *Note: Due to a large number of results and/or encounters for the requested time period, some results have not been displayed. A complete set of results can be found in Results Review.       Discharge Medications   Current Discharge Medication List      CONTINUE these medications which have NOT CHANGED    Details   acetaminophen (TYLENOL) 32 mg/mL liquid Take 15.65 mLs (500 mg) by mouth every 4 hours as needed for fever or mild pain  Qty: 455 mL, Refills: 1    Associated Diagnoses: S/P bariatric surgery      amphetamine-dextroamphetamine (ADDERALL) 20 MG tablet TAKE ONE TABLET BY MOUTH ONCE DAILY  Qty: 30 tablet, Refills: 0    Associated Diagnoses: ADHD (attention deficit hyperactivity disorder), inattentive type      carvedilol (COREG) 6.25 MG tablet Take 6.25 mg by mouth 2 times daily (with meals)      cyanocobalamin (CYANOCOBALAMIN) 1000 MCG/ML injection Inject 1 mL (1,000 mcg) into the muscle  "every 30 days  Qty: 1 mL, Refills: 11    Associated Diagnoses: Vitamin B12 deficiency (non anemic)      diphenhydrAMINE (BENADRYL) 25 MG capsule Take 1 capsule (25 mg) by mouth every 8 hours 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 [facility] MANAGED PATIENT Contact [facility] for patient specific medication information at [phone] on admission and discharge from the hospital.  Phones are answered 24 hours a day 7 days a week 365 days a year.    Providers - Choose \"CONTINUE HOME MED (no script)\" at discharge if patient treatment with home infusion will continue.    Comments: Resume prior to admission TPN/Lipids/saline  Associated Diagnoses: S/P bariatric surgery      lidocaine, alum & mag hydroxide-simethicone GI Cocktail 30 ml daily as needed for mouth/stomach pain.  Qty: 1 Bottle, Refills: 1    Associated Diagnoses: Gastric bypass status for obesity      LORazepam (ATIVAN) 1 MG tablet TAKE 1 TABLET (1MG) BY MOUTH AS NEEDED FOR ANXIETY WITH TPN AND MEDICATIONS . JUST ONCE A DAY (30 TO LAST 30 DAYS)  Qty: 30 tablet, Refills: 0    Associated Diagnoses: ADHD (attention deficit hyperactivity disorder), inattentive type      ondansetron (ZOFRAN-ODT) 8 MG ODT tab DISSOLVE ONE TABLET ON TONGUE EVERY 8 HOURS AS NEEDED FOR NAUSEA  Qty: 90 tablet, Refills: 1    Associated Diagnoses: Nausea      pantoprazole (PROTONIX) 40 MG EC tablet Take 1 tablet (40 mg) by mouth daily  Qty: 30 tablet, Refills: 5    Associated Diagnoses: S/P bariatric surgery      !! parenteral nutrition - PTA/DISCHARGE ORDER Patient receives 2050 mL TPN every 14 hours through PICC at [facility].      sucralfate (CARAFATE) 1 GM/10ML suspension Take 1 g by mouth 4 times daily as needed      vancomycin 1,750 mg Inject 1,750 mg into the vein every 12 hours  Qty: 21 each, Refills: 0      vitamin D2 (ERGOCALCIFEROL) 63317 units (1250 mcg) capsule Take 1 capsule (50,000 Units) " by mouth once a week  Qty: 12 capsule, Refills: 3    Associated Diagnoses: Vitamin D deficiency      albuterol (PROAIR HFA/PROVENTIL HFA/VENTOLIN HFA) 108 (90 Base) MCG/ACT inhaler Inhale 2 puffs into the lungs every 4 hours as needed for shortness of breath / dyspnea or wheezing  Qty: 1 Inhaler, Refills: 1    Comments: Pharmacy may dispense brand covered by insurance (Proair, or proventil or ventolin or generic albuterol inhaler)  Associated Diagnoses: Productive cough      alteplase 2 mg/2 mL (in 10 mL syringe) Inject 1 mg into the vein as needed      fentaNYL (DURAGESIC) 25 mcg/hr 72 hr patch Place 1 patch onto the skin every 48 hours remove old patch.   May fill 8/31/20 and start 9/2/20  Qty: 15 patch, Refills: 0    Associated Diagnoses: Chronic abdominal pain; Chronic pain syndrome      naloxone (NARCAN) 4 MG/0.1ML nasal spray Spray 1 spray (4 mg) into one nostril alternating nostrils as needed for opioid reversal every 2-3 minutes until assistance arrives  Qty: 0.2 mL, Refills: 0    Associated Diagnoses: Chronic pain syndrome      oxyCODONE (ROXICODONE) 5 MG/5ML solution Take 5-10 mLs (5-10 mg) by mouth 2 times daily as needed for pain Max of 20mg/day. 30 day supply.  Qty: 600 mL, Refills: 0    Associated Diagnoses: Chronic abdominal pain       !! - Potential duplicate medications found. Please discuss with provider.        Allergies   Allergies   Allergen Reactions     Bactrim [Sulfamethoxazole W/Trimethoprim] Rash     Penicillins Anaphylaxis     Please see Antimicrobial Management Team allergy assessment note 10/10/2018. Patient reported tolerating amoxicillin.     Doxycycline Rash     Vancomycin Rash     Rash after receiving vancomycin 3/28/16 (red man's?). Tolerated with slower infusion and diphenhydramine premed.

## 2020-09-04 NOTE — IR NOTE
Patient Name: Parker Acevedo  Medical Record Number: [id number]  Today's Date: 9/4/2020    Procedure: tunneled central venous catheter placement  Proceduralist: Dr COMFORT Urbina    Sedation start time: 1054  Sedation end time: 1106  Sedation medications administered: 2 mg versed, 100 mcg fentanyl  Total sedation time: 12 min  Sedation Notes: none    Procedure start time: 1052  Puncture time: 1053  Procedure end time: 1106  Out of room time: 1120    Report given to: Sandie HARRISON   : none    Other Notes: Pt arrived to IR room 2 from . Consent reviewed, pt confirmed. Pt denies any questions or concerns regarding procedure. Pt positioned supine and monitored per protocol. Heparin locked as ordered. Site cleansed and dressed per protocol. Pt tolerated procedure without any noted complications. Pt transferred back to .

## 2020-09-06 ENCOUNTER — PATIENT OUTREACH (OUTPATIENT)
Dept: CARE COORDINATION | Facility: CLINIC | Age: 50
End: 2020-09-06

## 2020-09-06 LAB
BACTERIA SPEC CULT: NO GROWTH
SPECIMEN SOURCE: NORMAL

## 2020-09-08 ENCOUNTER — HOSPITAL ENCOUNTER (OUTPATIENT)
Dept: LAB | Facility: CLINIC | Age: 50
Discharge: HOME | End: 2020-09-08
Attending: SURGERY | Admitting: SURGERY
Payer: COMMERCIAL

## 2020-09-08 LAB
ALBUMIN SERPL-MCNC: 3.2 G/DL (ref 3.4–5)
ALP SERPL-CCNC: 92 U/L (ref 40–150)
ALT SERPL W P-5'-P-CCNC: 25 U/L (ref 0–70)
ANION GAP SERPL CALCULATED.3IONS-SCNC: 4 MMOL/L (ref 3–14)
AST SERPL W P-5'-P-CCNC: 14 U/L (ref 0–45)
BACTERIA SPEC CULT: NO GROWTH
BASOPHILS # BLD AUTO: 0.1 10E9/L (ref 0–0.2)
BASOPHILS NFR BLD AUTO: 0.6 %
BILIRUB SERPL-MCNC: 0.2 MG/DL (ref 0.2–1.3)
BUN SERPL-MCNC: 19 MG/DL (ref 7–30)
CALCIUM SERPL-MCNC: 7.8 MG/DL (ref 8.5–10.1)
CHLORIDE SERPL-SCNC: 112 MMOL/L (ref 94–109)
CO2 SERPL-SCNC: 26 MMOL/L (ref 20–32)
CREAT SERPL-MCNC: 0.6 MG/DL (ref 0.66–1.25)
DIFFERENTIAL METHOD BLD: ABNORMAL
EOSINOPHIL NFR BLD AUTO: 3.4 %
ERYTHROCYTE [DISTWIDTH] IN BLOOD BY AUTOMATED COUNT: 16.7 % (ref 10–15)
GFR SERPL CREATININE-BSD FRML MDRD: >90 ML/MIN/{1.73_M2}
GLUCOSE SERPL-MCNC: 82 MG/DL (ref 70–99)
HCT VFR BLD AUTO: 30.4 % (ref 40–53)
HGB BLD-MCNC: 9.9 G/DL (ref 13.3–17.7)
IMM GRANULOCYTES # BLD: 0 10E9/L (ref 0–0.4)
IMM GRANULOCYTES NFR BLD: 0.2 %
LYMPHOCYTES # BLD AUTO: 1.8 10E9/L (ref 0.8–5.3)
LYMPHOCYTES NFR BLD AUTO: 22.1 %
Lab: NORMAL
MAGNESIUM SERPL-MCNC: 2 MG/DL (ref 1.6–2.3)
MCH RBC QN AUTO: 29.6 PG (ref 26.5–33)
MCHC RBC AUTO-ENTMCNC: 32.6 G/DL (ref 31.5–36.5)
MCV RBC AUTO: 91 FL (ref 78–100)
MONOCYTES # BLD AUTO: 1.2 10E9/L (ref 0–1.3)
MONOCYTES NFR BLD AUTO: 14.4 %
NEUTROPHILS # BLD AUTO: 4.9 10E9/L (ref 1.6–8.3)
NEUTROPHILS NFR BLD AUTO: 59.3 %
NRBC # BLD AUTO: 0 10*3/UL
NRBC BLD AUTO-RTO: 0 /100
PHOSPHATE SERPL-MCNC: 3.6 MG/DL (ref 2.5–4.5)
PLATELET # BLD AUTO: 181 10E9/L (ref 150–450)
POTASSIUM SERPL-SCNC: 3.5 MMOL/L (ref 3.4–5.3)
PROT SERPL-MCNC: 6.2 G/DL (ref 6.8–8.8)
RBC # BLD AUTO: 3.34 10E12/L (ref 4.4–5.9)
SODIUM SERPL-SCNC: 142 MMOL/L (ref 133–144)
SPECIMEN SOURCE: NORMAL
TRIGL SERPL-MCNC: 44 MG/DL
WBC # BLD AUTO: 8.3 10E9/L (ref 4–11)

## 2020-09-08 PROCEDURE — 84100 ASSAY OF PHOSPHORUS: CPT | Performed by: SURGERY

## 2020-09-08 PROCEDURE — 85025 COMPLETE CBC W/AUTO DIFF WBC: CPT | Performed by: SURGERY

## 2020-09-08 PROCEDURE — 83735 ASSAY OF MAGNESIUM: CPT | Performed by: SURGERY

## 2020-09-08 PROCEDURE — 84478 ASSAY OF TRIGLYCERIDES: CPT | Mod: GZ | Performed by: SURGERY

## 2020-09-08 PROCEDURE — 80053 COMPREHEN METABOLIC PANEL: CPT | Performed by: SURGERY

## 2020-09-08 NOTE — PROGRESS NOTES
This is a recent snapshot of the patient's [facility] medical record.  For current drug dose and complete information and questions, call [phone] or In Basket pool, [facility] home infusion ([id number])  CSN Number:  [id number]

## 2020-09-08 NOTE — PROGRESS NOTES
[facility]: Post-Discharge Note  SITUATION                                                      Admission:    Admission Date: 08/31/20   Reason for Admission: Staph lugdunensis bacteremia, CVC associated infection  Discharge:   Discharge Date: 09/04/20  Discharge Diagnosis: Staph lugdunensis bacteremia, CVC associated infection    BACKGROUND                                                      Mr. Acevedo is a 50yo M with PMHx notable for s/p bariatric surgery on chronicTPN, recurrent CLABSI, gastric ulcer, short gut syndrome, and recent MRSE bacteremia 2/2 Cm CVC infection admitted on 8/31 with line associated Staph lugdunensis bacteremia    ASSESSMENT      Discharge Assessment  Patient reports symptoms are: Improved  Does the patient have all of their medications?: Yes  Does patient know what their new medications are for?: Yes  Does patient have a follow-up appointment scheduled?: Yes  Does patient have any other questions or concerns?: No    Post-op  Did the patient have surgery or a procedure: No  Fever: No  Chills: No  Eating & Drinking: eating and drinking without complaints/concerns  PO Intake: regular diet  Bowel Function: normal  Urinary Status: voiding without complaint/concerns        PLAN                                                      Outpatient Plan:  Follow-up Appointments     Adult [facility] Follow-up and recommended labs and tests      Follow up with Dr. Buckner , at [facility], on 9/24 @ 930AM       Appointments on [place] and/or [facility] (with [facility] or [facility]   provider or service). Call [phone] if you haven't heard regarding   these appointments within 7 days of discharge.          Plan for weekly CBC, BUN, Cr and Vancomycin level while on IV Vancomycin to be faxed to ID Clinic at [phone]    Future Appointments   Date Time Provider Department Center   9/14/2020  1:00 PM Chuy Isaac MD [facility]   9/16/2020  2:45 PM  Patti Milligan MD [facility]   9/24/2020  9:30 AM Марина Buckner MD [facility]           Stormy Miller, CMA

## 2020-09-09 ENCOUNTER — HOME INFUSION (PRE-WILLOW HOME INFUSION) (OUTPATIENT)
Dept: PHARMACY | Facility: CLINIC | Age: 50
End: 2020-09-09

## 2020-09-09 ENCOUNTER — PATIENT OUTREACH (OUTPATIENT)
Dept: NURSING | Facility: CLINIC | Age: 50
End: 2020-09-09
Payer: COMMERCIAL

## 2020-09-09 ASSESSMENT — ACTIVITIES OF DAILY LIVING (ADL): DEPENDENT_IADLS:: MEDICATION MANAGEMENT;TRANSPORTATION;SHOPPING

## 2020-09-09 NOTE — PROGRESS NOTES
Clinic Care Coordination Contact    Clinic Care Coordination Contact  OUTREACH    Referral Information:  Referral Source: IP Report    Primary Diagnosis: SIRS/Sepsis    Chief Complaint   Patient presents with     Clinic Care Coordination - Post Hospital     RN        Universal Utilization: Patient utilizes [facility] and is followed by bariatric surgery and cardiology.  Patient will need to follow up with infectious disease following this hospitalization.  Clinic Utilization  Difficulty keeping appointments:: Yes  Compliance Concerns: Yes  No-Show Concerns: No  No PCP office visit in Past Year: No  Utilization    Last refreshed: 9/9/2020  3:06 AM:  Hospital Admissions 5           Last refreshed: 9/9/2020  3:06 AM:  ED Visits 5           Last refreshed: 9/9/2020  3:06 AM:  No Show Count (past year) 6              Current as of: 9/9/2020  3:06 AM              Clinical Concerns:  Current Medical Concerns:  RN CC spoke with patient's spouse Rose (consent to communicate on file) and patient for hospital follow up.  Patient was inpatient at [facility] 8/31/20-9/4/20 for staph lugdunensis bacteremia CVC associated infection.  Patient 's azevedo catheter was replaced.  Patient's G-tube remains out prior to hospitalization and is awaiting a call from surgery to schedule replacement procedure.  Patient was discharged on IV Vancomycin, however, after discharge this was changed to IV Ancef.  Patient's IV antibiotics are managed by [facility] and patient is receiving weekly skilled nursing visits from [facility].  Patient has follow up appointments scheduled with PCP 9/14/20 and infectious disease 9/24/20.  Patient had his bariatric surgery follow up appointment on 8/27/20 prior to hospitalization.  Patient continues to need to schedule his cardiology follow up appointment.    Patient has bloating due to no G-tube present.  Rose states the surgery is  is to call this week to schedule a time to have  the G-tube replaced.  Rose will contact writer if she does not receive a response within 2 days.  Patient Active Problem List   Diagnosis     Peptic ulcer disease     Gastric bypass status for obesity     Chronic abdominal pain     Vomiting     Anemia     ADHD (attention deficit hyperactivity disorder), inattentive type     Vitamin B12 deficiency without anemia     Thiamine deficiency     Dehydration     Dysphagia     Weight loss, non-intentional     Malnutrition (H)     Chronic anxiety     Constipation     Bile reflux esophagitis     Former smoker     Vitamin D deficiency     Iron deficiency     Health Care Home     Chronic pain     Insomnia     Positive blood culture     Fungemia     Chronic nausea     Gastrostomy tube in place (H)     Cardiomyopathy in nutritional diseases (H)     Short gut syndrome     Anxiety     Status post cervical spinal arthrodesis     Port or reservoir infection, initial encounter     Abnormal echocardiogram     Low serum iron     Anemia, iron deficiency     Catheter-related bloodstream infection (CRBSI)     Generalized weakness     S/P bariatric surgery     Hyperlipidemia LDL goal <130     Bacteremia     Infection by Candida species     PICC line infiltration, sequela     Gram-positive bacteremia     On total parenteral nutrition        Current Behavioral Concerns: No concerns at this time.      Education Provided to patient: RN CC reviewed hospital discharge instructions and plan of care/needed appointments.     Pain  Pain (GOAL):: Yes  Type: Acute (<3mo)  Location of chronic pain:: chronic abdominal pain and lower back pain, generalized  Radiating: No  Progression: Waxing and Waning  Description of pain: Aching  Chronic pain severity:: 6  Limitation of routine activities due to chronic pain:: Yes  Description: Unable to perform most daily activities (chores, hobbies, social activities, driving)  Alleviating Factors: Pain Medication, Rest, Heat, Ice  Aggravating Factors: Eating,  Positioning, Activity  Health Maintenance Reviewed: Due/Overdue   Health Maintenance Due   Topic Date Due     MEDICARE ANNUAL WELLNESS VISIT  06/21/2017     INFLUENZA VACCINE (1) 09/01/2020     URINE DRUG SCREEN  09/11/2020      Clinical Pathway: None    Medication Management:  Medication reconciliation status: Medications reviewed and reconciled.  Continue medications without change.      Functional Status:  Dependent ADLs:: Ambulation-cane, Bathing  Dependent IADLs:: Medication Management, Transportation, Shopping  Bed or wheelchair confined:: No  Mobility Status: Independent    Living Situation:  Current living arrangement:: I live in a private home with spouse  Type of residence:: Private home - stairs    Lifestyle & Psychosocial Needs:  Lifestyle     Physical activity     Days per week: 2 days     Minutes per session: 10 min     Stress: Only a little     Social Needs     Financial resource strain: Not hard at all     Food insecurity     Worry: Never true     Inability: Never true     Transportation needs     Medical: No     Non-medical: No     Diet:: Regular  Inadequate nutrition (GOAL):: No  Tube Feeding: No  Inadequate activity/exercise (GOAL):: No  Significant changes in sleep pattern (GOAL): No  Transportation means:: Regular car, Friend  Financial/Insurance concerns (GOAL):: No  Hindu or spiritual beliefs that impact treatment:: No  Mental health DX:: Yes  Mental health DX how managed:: Medication  Mental health management concern (GOAL):: No  Informal Support system:: Family, Spouse   Socioeconomic History     Marital status:      Spouse name: Rose     Number of children: 1     Years of education: Not on file     Highest education level: GED or equivalent   Relationships     Social connections     Talks on phone: Once a week     Gets together: Never     Attends Latter day service: Never     Active member of club or organization: No     Attends meetings of clubs or organizations: Never      Relationship status:      Intimate partner violence     Fear of current or ex partner: No     Emotionally abused: No     Physically abused: No     Forced sexual activity: No     Tobacco Use     Smoking status: Current Some Day Smoker     Packs/day: 1.00     Years: 6.00     Pack years: 6.00     Types: Cigarettes     Smokeless tobacco: Never Used   Substance and Sexual Activity     Alcohol use: No     Frequency: Never     Drinks per session: Patient refused     Binge frequency: Never     Comment: quit 2002     Drug use: No     Sexual activity: Yes     Partners: Female     Birth control/protection: None     Comment: no protection               Resources and Interventions:  Current Resources:   List of home care services:: Skilled Nursing  Community Resources: Home Infusion, Home Care  Supplies used at home:: None  Equipment Currently Used at Home: cane, straight, shower chair    Advance Care Plan/Directive  Advanced Care Plans/Directives on file:: Yes  Type Advanced Care Plans/Directives: Advanced Directive - On File  Advanced Care Plan/Directive Status: Not Applicable    Referrals Placed: None     Goals:   Goals        General    #1 Medical (pt-stated)     Notes - Note created  9/9/2020  2:36 PM by Behl, Melissa K, RN    Goal Statement: I want to prevent hospital visits  Date Goal set: 9/9/2020   Barriers: on chronic TPN, IV line  Strengths: home infusion, care coordination  Date to Achieve By: 12/31/20  Patient expressed understanding of goal: yes  Action steps to achieve this goal:  1. I will complete IV antibiotics as prescribed.  2. I will follow up with infectious disease provider as scheduled 9/24/20.  3. I will stay at home during COVID-19 pandemic.   4. I will monitor my temperature daily as instructed and contact [facility] for elevated temperature parameters.      #2 Medical (pt-stated)     Notes - Note edited  9/9/2020  2:37 PM by Behl, Melissa K, RN    Goal Statement: I will schedule a  routine exam with Dr. Isaac  Date Goal set: 7/10/2020   Barriers: COVID-19 pandemic  Strengths: support from spouse, care coordination  Date to Achieve By: 12/1/20  Patient expressed understanding of goal: yes  Action steps to achieve this goal:  1. I will schedule an appointment with Dr. Isaac (completed)  2. I will attend appointment with Dr. Isaac (scheduled for 9/14/20)        #3 Medical (pt-stated)     Notes - Note edited  9/9/2020  2:34 PM by Behl, Melissa K, RN    Goal Statement: I will schedule and attend an appointment with cardiology.  Date Goal set: 10/7/19  Barriers: COVID-19 pandemic, multiple specialists  Strengths: support from spouse  Date to Achieve By: 11/1/20  Patient expressed understanding of goal: yes  Action steps to achieve this goal:  1. I will schedule an appointment with cardiology  2. I will attend appointment with cardiology                 Patient/Caregiver understanding: Patient/spouse verbalized understanding of education and plan of care.    Outreach Frequency: monthly  Future Appointments              In 5 days Chuy Isaac MD [facility]    In 1 week Patti Milligan MD [facility],  [facility]    In 2 weeks Марина Buckner MD [facility]ious Diseases, [facility]          Plan:   1. Patient will attend future appointments with infectious disease and PCP.  2. Patient will await call to schedule surgery to replace G-tube.  3. Patient will continue to monitor for signs/symptoms of infection.  4. Patient will schedule cardiology appointment.  5. RN CC will send updated complex care plan via [employer]t.  6. RN CC will follow up with patient in 1 month.    Melissa Behl BSN, RN, PHN, CCM  Primary Care Clinical RN Care Coordinator  [facility]   [phone]

## 2020-09-09 NOTE — LETTER
[facility] CARE COORDINATION  [address] [facility] [address] [address]   September 9, 2020        Parker Acevedo  [address]  [place] [address]          Dear Parker,     It was nice to talk with you and Rose today.  I hope you feel better soon.  Attached is an updated Complex Care Plan for your continued enrollment in Care Coordination. Please let us know if you have additional questions, concerns, or goals that we can assist with.    Sincerely,    Melissa Behl BSN, RN, PHN, [facility]  Primary Care Clinical RN Care Coordinator  [facility]   [phone]          [facility]  Complex Care Plan  About Me:    Patient Name:  Parker Acevedo    YOB: 1970  Age:         49 year old   [place] MRN:    [id number] Telephone Information:  Home Phone [phone]   Mobile [phone]       Address:  [address] [place] [address]  [place] [address] Email address:  [email or web address]      Emergency Contact(s)    Name Relationship Lgl Grd Work Phone Home Phone Mobile Phone   1. BERTRAND RAMOS* Spouse No  [phone]   2. ANT JEYSON * Relative No  [phone]   3. CHARLI ACEVEDO* Mother No  [phone]           Primary language:  English     needed? No   [place] [facility]:  [phone] op. 1  Other communication barriers: Glasses, Physical impairment  Preferred Method of Communication:  Chris  Current living arrangement: I live in a private home with spouse  Mobility Status/ Medical Equipment: Independent    Health Maintenance  Health Maintenance Reviewed: Due/Overdue   Health Maintenance Due   Topic Date Due     MEDICARE ANNUAL WELLNESS VISIT  06/21/2017     INFLUENZA VACCINE (1) 09/01/2020     URINE DRUG SCREEN  09/11/2020        My Access Plan  Medical Emergency 911   Primary Clinic Line [facility] - [phone]   24 Hour Appointment Line  [phone] or  [phone]) (toll-free)   24 Hour Nurse Line [phone] (toll-free)   Preferred Urgent Care Other   Preferred Hospital [facility]  [phone]   Preferred [place] [facility] [place] [address] [facility]     Behavioral Health Crisis Line The National Suicide Prevention Lifeline at 1-479.146.1753 or 911             My Care Team Members  Patient Care Team       Relationship Specialty Notifications Start End    Chuy Isaac MD PCP - General Internal Medicine  10/30/18     Phone: [phone] Fax: [phone]         [address] [facility] [address]    Francis Vyas MD Referring Physician Surgery  4/9/15     GI     Phone: [phone] Fax: [phone]         [address] [facility] [address] [facility] [address]    Bill Brumfield MD MD Gastroenterology  6/2/15     Phone: [phone] Fax: [phone]         [address] [facility] [address]    Patti Milligan MD MD Pain Clinic  6/2/15     Phone: [phone] Fax: [phone]         [address] [place] [address] [facility] [address]    Behl, Melissa K, RN Lead Care Coordinator Primary Care - CC Admissions 3/28/18     Phone: [phone] Fax: [phone]        Chuy Isaac MD Assigned PCP   11/11/18     Phone: [phone] Fax: [phone]         [address] [facility] [address]    Marlyn Jenkins MD MD Ophthalmology  1/29/19     Phone: [phone] Fax: [phone]         [address] [facility] [address] [facility] [address]    Marlyn Jenkins MD MD Ophthalmology  2/11/19     Phone: [phone] Fax: [phone]         [address] [facility] [address] [facility] [address]            My Care Plans  Self Management and Treatment Plan  Goals and (Comments)  Goals        General    #1 Medical (pt-stated)     Notes - Note created  9/9/2020  2:36 PM by Behl, Melissa K, RN    Goal Statement: I want to prevent hospital  visits  Date Goal set: 9/9/2020   Barriers: on chronic TPN, IV line  Strengths: home infusion, care coordination  Date to Achieve By: 12/31/20  Patient expressed understanding of goal: yes  Action steps to achieve this goal:  1. I will complete IV antibiotics as prescribed.  2. I will follow up with infectious disease provider as scheduled 9/24/20.  3. I will stay at home during COVID-19 pandemic.   4. I will monitor my temperature daily as instructed and contact [facility] for elevated temperature parameters.      #2 Medical (pt-stated)     Notes - Note edited  9/9/2020  2:37 PM by Behl, Melissa K, RN    Goal Statement: I will schedule a routine exam with Dr. Isaac  Date Goal set: 7/10/2020   Barriers: COVID-19 pandemic  Strengths: support from spouse, care coordination  Date to Achieve By: 12/1/20  Patient expressed understanding of goal: yes  Action steps to achieve this goal:  1. I will schedule an appointment with Dr. Isaac (completed)  2. I will attend appointment with Dr. Isaac (scheduled for 9/14/20)        #3 Medical (pt-stated)     Notes - Note edited  9/9/2020  2:34 PM by Behl, Melissa K, RN    Goal Statement: I will schedule and attend an appointment with cardiology.  Date Goal set: 10/7/19  Barriers: COVID-19 pandemic, multiple specialists  Strengths: support from spouse  Date to Achieve By: 11/1/20  Patient expressed understanding of goal: yes  Action steps to achieve this goal:  1. I will schedule an appointment with cardiology  2. I will attend appointment with cardiology                  Action Plans on File:                       Advance Care Plans/Directives Type:   Type Advanced Care Plans/Directives: Advanced Directive - On File    My Medical and Care Information  Problem List   Patient Active Problem List   Diagnosis     Peptic ulcer disease     Gastric bypass status for obesity     Chronic abdominal pain     Vomiting     Anemia     ADHD (attention deficit hyperactivity  disorder), inattentive type     Vitamin B12 deficiency without anemia     Thiamine deficiency     Dehydration     Dysphagia     Weight loss, non-intentional     Malnutrition (H)     Chronic anxiety     Constipation     Bile reflux esophagitis     Former smoker     Vitamin D deficiency     Iron deficiency     Health Care Home     Chronic pain     Insomnia     Positive blood culture     Fungemia     Chronic nausea     Gastrostomy tube in place (H)     Cardiomyopathy in nutritional diseases (H)     Short gut syndrome     Anxiety     Status post cervical spinal arthrodesis     Port or reservoir infection, initial encounter     Abnormal echocardiogram     Low serum iron     Anemia, iron deficiency     Catheter-related bloodstream infection (CRBSI)     Generalized weakness     S/P bariatric surgery     Hyperlipidemia LDL goal <130     Bacteremia     Infection by Candida species     PICC line infiltration, sequela     Gram-positive bacteremia     On total parenteral nutrition      Current Medications and Allergies:  See printed Medication Report.    Care Coordination Start Date: 1/10/2019   Frequency of Care Coordination: monthly   Form Last Updated: 09/09/2020

## 2020-09-09 NOTE — PROGRESS NOTES
Return Bariatric Surgery Note    RE: Parker Acevedo  MR#: [id number]  : 1970  VISIT DATE: Aug 27, 2020    Dear Dr. Isaac,    I had the pleasure of seeing your patient, Parker Acevedo, in my post-bariatric surgery assessment clinic.    CHIEF COMPLAINT: Post-bariatric surgery follow-up    Parker has chronic bowel dysmotility which causes intermittent bloating and potential for blow-out of the gastric remnant.    We have previously proven that there is no anatomic obstruction of any portion of the foregut or small bowel anatomy.    Requires TPN and has used g-tube in the remnant for pressure venting of the lower stomach.    G-tube fell out 2 weeks previously and he was unable to come into clinic immediately.  That would have been very helpful since the g-tube tract was likely open at that time.    Now the g-tube tract has closed down.  Occasional spits fluid, but no longer patent.    HISTORY OF PRESENT ILLNESS:  Questions Regarding Prior Weight Loss Surgery Reviewed With Patient 2019   I had the following weight loss procedure: Celestino-en-y Gastric Bypass   What year was your surgery? 2003   How has your weight changed since your last visit? I have lost weight   Are you currently taking any weight loss medications? No   Do you currently have any of the following: Heartburn, acid reflux, or GERD (acid reflux disease)?   Have you been to the Emergency room since your last visit with us? Yes   Were you in the hospital since your last visit with us? Yes   Do you have any concerns today? Yes       Weight History:     2019   What is your highest lifetime weight? 489   What is your lowest weight since surgery? (In pounds) 141        Weight: 92.9 kg (204 lb 14.4 oz)             Questions Regarding Co-Morbidities and Health Concerns Reviewed With Patient 2019   Pre-diabetes: Never   Diabetes II: Never   High Blood Pressure: Never   High cholesterol: Stayed the same   Heartburn/Reflux: Worsened    Are you taking daily medication for heartburn, acid reflux, or GERD (acid reflux disease)? Yes   Sleep apnea: Stayed the same   PCOS: Never   Back pain: Worsened   Joint pain: Worsened   Lower leg swelling: Stayed the same       Eating Habits 1/28/2019   How many meals do you eat per day? 1   Do you snack between meals? No   How much food are you eating at each meal? Less than 1/2 cup   Are you able to separate your meals and liquids by at least 30 minutes? No   Are you able to avoid liquid calories? No       Exercise Questions Reviewed With Patient 1/28/2019   How often do you exercise? Less than 1 time per week   What is the duration of your exercise (in minutes)? I do not exercise.   What types of exercise do you do? walking   What keeps you from being more active?  Pain, I have recently been sick, My ability to walk or move around is limited, Too tired       Social History:      1/28/2019   Are you smoking? No   Are you drinking alcohol? No       Medications:  Current Outpatient Medications   Medication     acetaminophen (TYLENOL) 32 mg/mL liquid     albuterol (PROAIR HFA/PROVENTIL HFA/VENTOLIN HFA) 108 (90 Base) MCG/ACT inhaler     alteplase 2 mg/2 mL (in 10 mL syringe)     carvedilol (COREG) 6.25 MG tablet     cyanocobalamin (CYANOCOBALAMIN) 1000 MCG/ML injection     diphenhydrAMINE (BENADRYL) 25 MG capsule     [facility] INFUSION MANAGED PATIENT     naloxone (NARCAN) 4 MG/0.1ML nasal spray     ondansetron (ZOFRAN-ODT) 8 MG ODT tab     parenteral nutrition - PTA/DISCHARGE ORDER     sucralfate (CARAFATE) 1 GM/10ML suspension     vitamin D2 (ERGOCALCIFEROL) 57859 units (1250 mcg) capsule     amphetamine-dextroamphetamine (ADDERALL) 20 MG tablet     ceFAZolin (ANCEF) 2 G/20ML injection     fentaNYL (DURAGESIC) 25 mcg/hr 72 hr patch     lidocaine, alum & mag hydroxide-simethicone GI Cocktail     LORazepam (ATIVAN) 1 MG tablet     oxyCODONE (ROXICODONE) 5 MG/5ML solution     pantoprazole (PROTONIX) 40 MG EC  "tablet     No current facility-administered medications for this visit.          1/28/2019   Do you avoid NSAIDs such as (Ibuprofen, Aleve, Naproxen, Advil)?   Yes       ROS:  GI:      1/28/2019   Vomiting: Yes   Diarrhea: Yes   Constipation: No   Swallowing trouble: Yes   Abdominal pain: Yes   Heartburn: Yes   Rash in skin folds: No   Depression: No   Stress urinary incontinence Yes     Skin:   BAR RBS ROS - SKIN 1/28/2019   Rash in skin folds: No     Psych:      1/28/2019   Depression: No   Anxiety: Yes     Female Only: No flowsheet data found.    LABS/IMAGING/MEDICAL RECORDS REVIEW: reviewed.    PHYSICAL EXAMINATION:  BP (!) 136/95 (BP Location: Left arm, Patient Position: Sitting, Cuff Size: Adult Large)   Pulse 72   Temp 98.7  F (37.1  C) (Oral)   Ht 1.803 m (5' 11\")   Wt 92.9 kg (204 lb 14.4 oz)   SpO2 99%   BMI 28.58 kg/m     NAD  g-tube incision has nearly closed.  There is no way to replace g-tube; I had a g-tube available for this.    ASSESSMENT AND PLAN:      1. Many years status laparoscopic gastric bypass and at least 3 revision procedures due to medical complication (ulcer); no hunger and inability to tolerate oral intake has left Parker dependent on TPN and g-tube for venting purposes.  I will plan to coordinate replacement of g-tube with Dr. Bach.    Will do EUS-guided gastric remnant access with g-tube placement.    2. Morbid Obesity historical current BMI: Body mass index is 28.58 kg/m .  3. Post surgical malabsorption:   Labs ordered per protocol.   Follow food plan per dietitian recommendations.   Continue taking recommended post-op vitamins.  4. Return to clinic prn.    Sincerely,    Francis Vyas MD    I spent a total of 20 minutes face to face with Parker during today's office visit. Over 50% of this time was spent counseling the patient and/or coordinating care.  Answers for HPI/ROS submitted by the patient on 8/27/2020   General Symptoms: Yes  Skin Symptoms: No  HENT Symptoms: " No  EYE SYMPTOMS: Yes  HEART SYMPTOMS: Yes  LUNG SYMPTOMS: No  INTESTINAL SYMPTOMS: Yes  URINARY SYMPTOMS: Yes  REPRODUCTIVE SYMPTOMS: No  SKELETAL SYMPTOMS: Yes  BLOOD SYMPTOMS: No  NERVOUS SYSTEM SYMPTOMS: No  MENTAL HEALTH SYMPTOMS: No  Feeling hot or cold when others believe the temperature is normal: Yes  Loss of height: No  Post-operative complications: Yes  Surgical site pain: Yes  Hallucinations: No  Change in or Loss of Energy: No  Hyperactivity: No  Confusion: No  Flashing of lights: No  Spots: No  Floaters: No  Redness: No  Crossed eyes: No  Tunnel Vision: No  Yellowing of eyes: No  Eye irritation: No  Fainting: No  Murmurs: No  Pacemaker: No  Varicose veins: Yes  Edema or swelling: Yes  Wake up at night with shortness of breath: No  Light-headedness: No  Exercise intolerance: Yes  Diarrhea: Yes  Blood in stool: No  Black stools: No  Rectal or Anal pain: No  Fecal incontinence: No  Yellowing of skin or eyes: No  Vomit with blood: No  Change in stools: No  Pain or burning: Yes  Trouble starting or stopping: Yes  Increased frequency of urination: No  Blood in urine: No  Decreased frequency of urination: Yes  Frequent nighttime urination: No  Flank pain: No  Difficulty emptying bladder: Yes  Neck pain: Yes  Swollen joints: No  Joint pain: Yes  Bone pain: Yes  Muscle cramps: Yes  Muscle weakness: No  Joint stiffness: Yes  Bone fracture: No

## 2020-09-10 ENCOUNTER — HOME INFUSION (PRE-WILLOW HOME INFUSION) (OUTPATIENT)
Dept: PHARMACY | Facility: CLINIC | Age: 50
End: 2020-09-10

## 2020-09-11 LAB
BACTERIA SPEC CULT: ABNORMAL
SPECIMEN SOURCE: ABNORMAL

## 2020-09-14 ENCOUNTER — VIRTUAL VISIT (OUTPATIENT)
Dept: INTERNAL MEDICINE | Facility: CLINIC | Age: 50
End: 2020-09-14
Payer: COMMERCIAL

## 2020-09-14 ENCOUNTER — HOME INFUSION (PRE-WILLOW HOME INFUSION) (OUTPATIENT)
Dept: PHARMACY | Facility: CLINIC | Age: 50
End: 2020-09-14

## 2020-09-14 ENCOUNTER — HOSPITAL ENCOUNTER (OUTPATIENT)
Dept: LAB | Facility: CLINIC | Age: 50
Discharge: HOME | End: 2020-09-14
Attending: SURGERY | Admitting: SURGERY
Payer: COMMERCIAL

## 2020-09-14 DIAGNOSIS — R11.0 NAUSEA: ICD-10-CM

## 2020-09-14 DIAGNOSIS — R78.81 GRAM-POSITIVE BACTEREMIA: Primary | ICD-10-CM

## 2020-09-14 DIAGNOSIS — Z98.84 GASTRIC BYPASS STATUS FOR OBESITY: ICD-10-CM

## 2020-09-14 DIAGNOSIS — B37.2 YEAST INFECTION OF THE SKIN: ICD-10-CM

## 2020-09-14 DIAGNOSIS — Z78.9 ON TOTAL PARENTERAL NUTRITION: ICD-10-CM

## 2020-09-14 LAB
ALBUMIN SERPL-MCNC: 3.2 G/DL (ref 3.4–5)
ALP SERPL-CCNC: 81 U/L (ref 40–150)
ALT SERPL W P-5'-P-CCNC: 14 U/L (ref 0–70)
ANION GAP SERPL CALCULATED.3IONS-SCNC: 3 MMOL/L (ref 3–14)
AST SERPL W P-5'-P-CCNC: 13 U/L (ref 0–45)
BASOPHILS # BLD AUTO: 0 10E9/L (ref 0–0.2)
BASOPHILS NFR BLD AUTO: 0.8 %
BILIRUB DIRECT SERPL-MCNC: <0.1 MG/DL (ref 0–0.2)
BILIRUB SERPL-MCNC: 0.4 MG/DL (ref 0.2–1.3)
BUN SERPL-MCNC: 9 MG/DL (ref 7–30)
CALCIUM SERPL-MCNC: 8.4 MG/DL (ref 8.5–10.1)
CHLORIDE SERPL-SCNC: 109 MMOL/L (ref 94–109)
CO2 SERPL-SCNC: 31 MMOL/L (ref 20–32)
CREAT SERPL-MCNC: 0.75 MG/DL (ref 0.66–1.25)
DIFFERENTIAL METHOD BLD: ABNORMAL
EOSINOPHIL NFR BLD AUTO: 4 %
ERYTHROCYTE [DISTWIDTH] IN BLOOD BY AUTOMATED COUNT: 16.8 % (ref 10–15)
GFR SERPL CREATININE-BSD FRML MDRD: >90 ML/MIN/{1.73_M2}
GLUCOSE SERPL-MCNC: 87 MG/DL (ref 70–99)
HCT VFR BLD AUTO: 31.5 % (ref 40–53)
HGB BLD-MCNC: 9.9 G/DL (ref 13.3–17.7)
IMM GRANULOCYTES # BLD: 0 10E9/L (ref 0–0.4)
IMM GRANULOCYTES NFR BLD: 0.2 %
LYMPHOCYTES # BLD AUTO: 1.5 10E9/L (ref 0.8–5.3)
LYMPHOCYTES NFR BLD AUTO: 28.8 %
MAGNESIUM SERPL-MCNC: 2.1 MG/DL (ref 1.6–2.3)
MCH RBC QN AUTO: 29 PG (ref 26.5–33)
MCHC RBC AUTO-ENTMCNC: 31.4 G/DL (ref 31.5–36.5)
MCV RBC AUTO: 92 FL (ref 78–100)
MONOCYTES # BLD AUTO: 0.8 10E9/L (ref 0–1.3)
MONOCYTES NFR BLD AUTO: 15.1 %
NEUTROPHILS # BLD AUTO: 2.6 10E9/L (ref 1.6–8.3)
NEUTROPHILS NFR BLD AUTO: 51.1 %
NRBC # BLD AUTO: 0 10*3/UL
NRBC BLD AUTO-RTO: 0 /100
PHOSPHATE SERPL-MCNC: 3.6 MG/DL (ref 2.5–4.5)
PLATELET # BLD AUTO: 168 10E9/L (ref 150–450)
POTASSIUM SERPL-SCNC: 3.5 MMOL/L (ref 3.4–5.3)
PROT SERPL-MCNC: 6.6 G/DL (ref 6.8–8.8)
RBC # BLD AUTO: 3.41 10E12/L (ref 4.4–5.9)
SODIUM SERPL-SCNC: 143 MMOL/L (ref 133–144)
TRIGL SERPL-MCNC: 63 MG/DL
WBC # BLD AUTO: 5 10E9/L (ref 4–11)

## 2020-09-14 PROCEDURE — 99495 TRANSJ CARE MGMT MOD F2F 14D: CPT | Mod: 95 | Performed by: INTERNAL MEDICINE

## 2020-09-14 PROCEDURE — 84100 ASSAY OF PHOSPHORUS: CPT | Performed by: SURGERY

## 2020-09-14 PROCEDURE — 85025 COMPLETE CBC W/AUTO DIFF WBC: CPT | Performed by: SURGERY

## 2020-09-14 PROCEDURE — 83735 ASSAY OF MAGNESIUM: CPT | Performed by: SURGERY

## 2020-09-14 PROCEDURE — 84478 ASSAY OF TRIGLYCERIDES: CPT | Mod: GZ | Performed by: SURGERY

## 2020-09-14 PROCEDURE — 80053 COMPREHEN METABOLIC PANEL: CPT | Performed by: SURGERY

## 2020-09-14 PROCEDURE — 82248 BILIRUBIN DIRECT: CPT | Performed by: SURGERY

## 2020-09-14 RX ORDER — NYSTATIN 100000 U/G
CREAM TOPICAL 2 TIMES DAILY
Qty: 30 G | Refills: 0 | Status: SHIPPED | OUTPATIENT
Start: 2020-09-14 | End: 2020-11-23

## 2020-09-14 NOTE — PROGRESS NOTES
This is a recent snapshot of the patient's [facility] medical record.  For current drug dose and complete information and questions, call [phone] or In Basket pool, [facility]v home infusion ([id number])  CSN Number:  [id number]

## 2020-09-14 NOTE — PROGRESS NOTES
"Parker Acevedo is a 49 year old male who is being evaluated via a billable telephone visit.      The patient has been notified of following:     \"This telephone visit will be conducted via a call between you and your physician/provider. We have found that certain health care needs can be provided without the need for a physical exam.  This service lets us provide the care you need with a short phone conversation.  If a prescription is necessary we can send it directly to your pharmacy.  If lab work is needed we can place an order for that and you can then stop by our lab to have the test done at a later time.    Telephone visits are billed at different rates depending on your insurance coverage. During this emergency period, for some insurers they may be billed the same as an in-person visit.  Please reach out to your insurance provider with any questions.    If during the course of the call the physician/provider feels a telephone visit is not appropriate, you will not be charged for this service.\"    Patient has given verbal consent for Telephone visit?  Yes    What phone number would you like to be contacted at? [phone]    How would you like to obtain your AVS? Chris Valerio     Parker Acevedo is a 49 year old male who presents via phone visit today for the following health issues:    [facility]      Hospital Follow-up Visit:    Hospital/Nursing Home/IP Rehab Facility: [facility]  Date of Admission: 8/31/20  Date of Discharge: 9/4/20  Reason(s) for Admission: staph lugdunensis bacteremia, cvc associated infection      Was your hospitalization related to COVID-19? No   Problems taking medications regularly:  vomiting  Medication changes since discharge: None  Problems adhering to non-medication therapy:  None    Summary of hospitalization:  [facility] discharge summary reviewed  Diagnostic Tests/Treatments reviewed.  Follow up needed: none  Other Healthcare " Providers Involved in Patient s Care:         Homecare  Update since discharge: improved.       Post Discharge Medication Reconciliation: discharge medications reconciled and changed, per note/orders.  Plan of care communicated with patient and family              Feels like infection is gone from his blood, still on antibiotics-Cefazolin.   No fevers. Antibiotics will finish soon.     Cm for TPN, using that.      G-J tube fell out 3 weeks ago.  Dr Vyas knows about this, working with GI on this.  He does get sick with vomiting, using zofran.     Dissolves his pills to take them.  Continues with pain medications.       Review of Systems   Constitutional, HEENT, cardiovascular, pulmonary, gi and gu systems are negative, except as otherwise noted. Just chronica abd pain and nausea       Objective          Vitals:  No vitals were obtained today due to virtual visit.    healthy, alert and no distress  PSYCH: Alert and oriented times 3; coherent speech, normal   rate and volume, able to articulate logical thoughts, able   to abstract reason, no tangential thoughts, no hallucinations   or delusions  His affect is normal  RESP: No cough, no audible wheezing, able to talk in full sentences  Remainder of exam unable to be completed due to telephone visits            Assessment/Plan:    Assessment & Plan     Parker was seen today for telephone.    Diagnoses and all orders for this visit:    Gram-positive bacteremia    Gastric bypass status for obesity    Nausea    Yeast infection of the skin  -     nystatin (MYCOSTATIN) 022110 UNIT/GM external cream; Apply topically 2 times daily    On total parenteral nutrition    49-year-old gentleman status post gastric bypass surgery.  This is been difficult for him as he has a lot of nausea and vomiting.  He actually has a GJ tube which drains the bypass for him.  Cannot really eat anything.  He is on TPN but gets line infections most recently had a staph infection.  He is a new  "Cm catheter now he was treated with Ancef which will stop today.    He denies any symptoms of infection and is feeling okay no fevers energy is all right.  Appears okay.    Issue with his lack of GJ is working with his surgeon.  He is got some erythema at the site we will give him some nystatin cream for this.  Vomiting he can continue the Zofran  Nutrition continues on TPN labs were checked on the eighth and were stable.  Again labs are pending today.     Tobacco Cessation:   reports that he has been smoking cigarettes. He has a 6.00 pack-year smoking history. He has never used smokeless tobacco.        BMI:   Estimated body mass index is 27.78 kg/m  as calculated from the following:    Height as of 8/31/20: 1.803 m (5' 11\").    Weight as of 9/3/20: 90.4 kg (199 lb 3.2 oz).               No follow-ups on file.    Chuy Isaac MD  [facility]    Phone call duration:  14 call interrupted so two calls for 14 minutes                "

## 2020-09-14 NOTE — PROGRESS NOTES
This is a recent snapshot of the patient's [facility] medical record.  For current drug dose and complete information and questions, call [phone] or In Basket pool, fv home infusion ([id number])  CSN Number:  [id number]

## 2020-09-15 NOTE — PROGRESS NOTES
This is a recent snapshot of the patient's [facility] medical record.  For current drug dose and complete information and questions, call [phone] or In Basket pool, fv home infusion ([id number])  CSN Number:  [id number]

## 2020-09-16 ENCOUNTER — HOME INFUSION (PRE-WILLOW HOME INFUSION) (OUTPATIENT)
Dept: PHARMACY | Facility: CLINIC | Age: 50
End: 2020-09-16

## 2020-09-18 NOTE — PROGRESS NOTES
This is a recent snapshot of the patient's [facility] medical record.  For current drug dose and complete information and questions, call [phone] or In [facility] pool, fv home infusion ([id number])  CSN Number:  [id number]

## 2020-09-21 ENCOUNTER — TELEPHONE (OUTPATIENT)
Dept: SURGERY | Facility: CLINIC | Age: 50
End: 2020-09-21

## 2020-09-21 NOTE — TELEPHONE ENCOUNTER
Patient's wife called inquiring about a surgery date for Parker. Discussed 10/27/20 date for the EUS, gastrostomy tube procedure with GI and Dr. Vyas.   Discussed the need for COVID-19 testing no more than 4 days prior.

## 2020-09-23 ENCOUNTER — MEDICAL CORRESPONDENCE (OUTPATIENT)
Dept: HEALTH INFORMATION MANAGEMENT | Facility: CLINIC | Age: 50
End: 2020-09-23

## 2020-09-24 ENCOUNTER — MYC MEDICAL ADVICE (OUTPATIENT)
Dept: INTERNAL MEDICINE | Facility: CLINIC | Age: 50
End: 2020-09-24

## 2020-09-24 ENCOUNTER — VIRTUAL VISIT (OUTPATIENT)
Dept: INFECTIOUS DISEASES | Facility: CLINIC | Age: 50
End: 2020-09-24
Attending: INTERNAL MEDICINE
Payer: COMMERCIAL

## 2020-09-24 ENCOUNTER — MYC MEDICAL ADVICE (OUTPATIENT)
Dept: SURGERY | Facility: CLINIC | Age: 50
End: 2020-09-24

## 2020-09-24 ENCOUNTER — MYC REFILL (OUTPATIENT)
Dept: PALLIATIVE MEDICINE | Facility: CLINIC | Age: 50
End: 2020-09-24

## 2020-09-24 ENCOUNTER — MYC REFILL (OUTPATIENT)
Dept: INTERNAL MEDICINE | Facility: CLINIC | Age: 50
End: 2020-09-24

## 2020-09-24 DIAGNOSIS — F90.0 ADHD (ATTENTION DEFICIT HYPERACTIVITY DISORDER), INATTENTIVE TYPE: ICD-10-CM

## 2020-09-24 DIAGNOSIS — R19.7 DIARRHEA OF PRESUMED INFECTIOUS ORIGIN: Primary | ICD-10-CM

## 2020-09-24 DIAGNOSIS — G89.29 CHRONIC ABDOMINAL PAIN: ICD-10-CM

## 2020-09-24 DIAGNOSIS — R11.0 NAUSEA: ICD-10-CM

## 2020-09-24 DIAGNOSIS — Z98.84 GASTRIC BYPASS STATUS FOR OBESITY: ICD-10-CM

## 2020-09-24 DIAGNOSIS — G89.4 CHRONIC PAIN SYNDROME: ICD-10-CM

## 2020-09-24 DIAGNOSIS — R10.9 CHRONIC ABDOMINAL PAIN: ICD-10-CM

## 2020-09-24 RX ORDER — FENTANYL 25 UG/1
1 PATCH TRANSDERMAL
Qty: 15 PATCH | Refills: 0 | Status: CANCELLED | OUTPATIENT
Start: 2020-09-24

## 2020-09-24 RX ORDER — OXYCODONE HCL 5 MG/5 ML
5-10 SOLUTION, ORAL ORAL 2 TIMES DAILY PRN
Qty: 600 ML | Refills: 0 | Status: CANCELLED | OUTPATIENT
Start: 2020-09-24

## 2020-09-24 NOTE — PROGRESS NOTES
"  Patient reports his temp is about 100 this AM, and he thinks he now has a hernia.        Parker Acevedo is a 49 year old male who is being evaluated via a billable telephone visit.      The patient has been notified of following:     \"This telephone visit will be conducted via a call between you and your physician/provider. We have found that certain health care needs can be provided without the need for a physical exam.  This service lets us provide the care you need with a short phone conversation.  If a prescription is necessary we can send it directly to your pharmacy.  If lab work is needed we can place an order for that and you can then stop by our lab to have the test done at a later time.    Telephone visits are billed at different rates depending on your insurance coverage. During this emergency period, for some insurers they may be billed the same as an in-person visit.  Please reach out to your insurance provider with any questions.    If during the course of the call the physician/provider feels a telephone visit is not appropriate, you will not be charged for this service.\"    Patient has given verbal consent for Telephone visit?  Yes    What phone number would you like to be contacted at? [phone]    How would you like to obtain your AVS? BitTorrent[employer]    Phone call duration: 16 minutes    .Марина Buckner MD     "

## 2020-09-24 NOTE — PROGRESS NOTES
[facility] Follow-Up Visit - telephone visit  Date of Visit:  09/24/2020            Interim Course:   S:   Has had some diarrhea and a low grade fever just today. Not convinced something new is going on at this point. New line is working well and doesn't have drainage or tenderness.     O:  GENERAL: Healthy, alert and no distress  RESP: No audible wheeze, cough.  Able to speak fully in complete sentences.  NEURO: Mentation intact and speech normal  PSYCH: Affect normal/bright, judgement and insight intact, normal speech      Assessment:  1. Intravascular non-hemodialysis catheter-related infection - drainage from line  2. TPN dependent  3. S. lugdunensis growth on blood culture from line  4. GPR and Gram negative coccobacilli from single peripheral culture. Unclear significance, but currently we do not plan to change therapy based on results of this single culture.     Discussion:  Treatment of S. lugdunensis is a species of coagulase negative staphylococcus with virulence that  matches that of S. aureus. They are treated in the same way.  His S. lugdunensis is susceptible to cefazolin and vancomycin. Better outcomes have been shown with beta lactams so we would treat him with cefazolin while inpatient. If his only home option is vancomycin, he can then be discharged on this agent. Unless he has additional positive cultures, his current bacteremia can be considered uncomplicated and he can receive 2 weeks of antibiotics.     TTE was done on 9/2 and showed no vegetations. No indication for RONEL given brief episode of bacteremia.     Plan:  1. Completed 2 weeks of IV cefazolin 2g Q8H on 9/14  2. No further antibiotics  3. Surveillance blood cultures if any more fevers  4. I'll touch base with home infusion about what sort of line they are interested in that can take an ethanol lock (which I agree would be useful) since I know the inpatient team tried to coordinate this during his last inpatient  stay but his current line apparently still is not appropriate for an ethanol lock  5. Check C diff if diarrhea doesn't improve.  .    Return to ID clinic PRN.    Марина Buckner MD  Division of Infectious Diseases and International Medicine  Pager: [phone]          ________________________________________________________________           History of Present Illness:   Patient is a 48 yo M with a history of bariatric surgery on chronic TPN, recurrent CLABSI, short-gut syndrome, and a hx of MRSE bacteremia, strep bacteremia, and corynebacteria bacteremia who presents today with 2 days of Fever wit G+ rods found on blood culture.    Patient states that he has had over 20 episodes of bacteremia over the past 4-5 years. The most recent episode, for which he presented to the hospital this time, started on 8/27. Patient called the [facility] pharmacy b/c of fever, chills, and night-sweats that had occurred for over 2 days. He would check his temperature at home. And it would fluctuate between 100.x up to a Tmax of 101.9. After calling the [facility] pharmacy, he went to have blood cultures drawn per their guidance. On  8/30, the blood cultures came back positive growing G+ rods. Patient was informed to come to the ED for continued management. Per the ED note the blood cultures were positive via peripheral draw (negative x2 from central line port).    Patient lives in [facility] in a home on a 3-acre property. He states he likes to walk in the woods on his property for leisure. Patient often notices ticks, but has not noticed any tick bites. Patient has multiple dogs at home. Patient lives with wife and kids. Nobody in his family has been sick. Patient states his reticular rash on his upper extremity is secondary to his time spent in the hot-tub.    Patient today endorses mild headache with resolved fevers, chills, night sweats, and diarrhea. Prior to hospitalization, patient stated he  experienced fevers, chills, night sweats, headache, joint pain, sore throat, palpitations, diarrhea and nausea/vomiting, and abdominal pain that is chronic s/p surgery. Patient endorsed no chest pain, dyspnea, cough, dizziness, dysuria, or hematuria.    Patient also stated during re-examination that he has noted drainage from his Cm line with clots and granular material.     Summary of Recent Hospitalizations:  8/3/2020 = repeat dual-lumen Cm replaced.  7/28-8/3/20 - treated for MRSE bactermia secondary to central venous catheter; repeat dual-lumen Cm replaced  July 2020 - treated for Corynebacterium infection with Vancomycin  11/4/19 - peripheral cultures negative; but initial cultures grew Corneybacterium and Peptostreptococcus which were deemed contaminants.  10/4/19 - Strep, Staph, and Granulicatella adiacens bactermia; Cm replaced on 10/1  5/16/19 - blood draw from PICC grew Rothia mucilaginosa  1/9/19 - Candidemia sepsis due to PICC line infection.

## 2020-09-24 NOTE — LETTER
[address] [address]  Phone: [phone]    September 25, 2020        Parker Acevedo  [address] [place] [facility] [address]          To whom it may concern:    RE: Parker Acevedo    This is a patient who had bariatric surgery but unfortunately has had multiple complications and cannot have oral intake due to significant nausea.  He is pain with TPN and has a gastric tube to release drainage and pressure.    Please allow for an under pad due to the drainage and seepage he uses four each day due to the frequency of this.    Please contact me for questions or concerns.      Sincerely,        Chuy Isaac MD

## 2020-09-24 NOTE — TELEPHONE ENCOUNTER
Adderall, lorazepam, lidocaine and ondansetron    Last Written Prescription Date:  8/31/20 for adderall, lidocaine, and lorazepam, 6/5/20-ondansetron  Last Fill Quantity: 30, 30, 1 bottle, 90,   # refills: 0,0,1,1  Last Office Visit: 09/14/20  Future Office visit:    Next 5 appointments (look out 90 days)    Oct 28, 2020  2:15 PM CDT  Return Visit with Patti Milligan MD  [facility] [place] ([facility] [place]) [address] [facility]  [place] [address]  [phone]           Routing refill request to provider for review/approval because:  Drug not on the [facility], [facility] or [facility] refill protocol or controlled substance

## 2020-09-24 NOTE — TELEPHONE ENCOUNTER
Phone call to patient:    Patient stating he has some swelling and tenderness in the groins bilaterally.  States it comes and goes.  And has tenderness into testes. States it will last for a couple of days and then go away.  Patient running a low grade temp but states his temperature fluctuates on a daily basis from normal to low-grade fever.  Currently has a Cm catheter for fluids. Encouraged patient to follow-up with primary provider for evaluation.    He states he has already sent a message and is waiting to hear from their office.  He will attempt to get into his primary for evaluation.

## 2020-09-24 NOTE — LETTER
"9/24/2020       RE: Parker Acevedo  [address]  [facility] [address]     Dear Colleague,    Thank you for referring your patient, Parker Acevedo, to the [facility] AND INFECTIOUS DISEASES at [facility]. Please see a copy of my visit note below.      Patient reports his temp is about 100 this AM, and he thinks he now has a hernia.        Parker Acevedo is a 49 year old male who is being evaluated via a billable telephone visit.      The patient has been notified of following:     \"This telephone visit will be conducted via a call between you and your physician/provider. We have found that certain health care needs can be provided without the need for a physical exam.  This service lets us provide the care you need with a short phone conversation.  If a prescription is necessary we can send it directly to your pharmacy.  If lab work is needed we can place an order for that and you can then stop by our lab to have the test done at a later time.    Telephone visits are billed at different rates depending on your insurance coverage. During this emergency period, for some insurers they may be billed the same as an in-person visit.  Please reach out to your insurance provider with any questions.    If during the course of the call the physician/provider feels a telephone visit is not appropriate, you will not be charged for this service.\"    Patient has given verbal consent for Telephone visit?  Yes    What phone number would you like to be contacted at? [phone]    How would you [facility] to obtain your [facility]? [facility]    Phone call duration: 16 minutes    .Марина Buckner MD       [facility] Follow-Up Visit - telephone visit  Date of Visit:  09/24/2020            Interim Course:   S:   Has had some diarrhea and a low grade fever just today. Not convinced something new is going on at this point. New line is working well " and doesn't have drainage or tenderness.     O:  GENERAL: Healthy, alert and no distress  RESP: No audible wheeze, cough.  Able to speak fully in complete sentences.  NEURO: Mentation intact and speech normal  PSYCH: Affect normal/bright, judgement and insight intact, normal speech      Assessment:  1. Intravascular non-hemodialysis catheter-related infection - drainage from line  2. TPN dependent  3. S. lugdunensis growth on blood culture from line  4. GPR and Gram negative coccobacilli from single peripheral culture. Unclear significance, but currently we do not plan to change therapy based on results of this single culture.     Discussion:  Treatment of S. lugdunensis is a species of coagulase negative staphylococcus with virulence that  matches that of S. aureus. They are treated in the same way.  His S. lugdunensis is susceptible to cefazolin and vancomycin. Better outcomes have been shown with beta lactams so we would treat him with cefazolin while inpatient. If his only home option is vancomycin, he can then be discharged on this agent. Unless he has additional positive cultures, his current bacteremia can be considered uncomplicated and he can receive 2 weeks of antibiotics.     TTE was done on 9/2 and showed no vegetations. No indication for RONEL given brief episode of bacteremia.     Plan:  1. Completed 2 weeks of IV cefazolin 2g Q8H on 9/14  2. No further antibiotics  3. Surveillance blood cultures if any more fevers  4. I'll touch base with home infusion about what sort of line they are interested in that can take an ethanol lock (which I agree would be useful) since I know the inpatient team tried to coordinate this during his last inpatient stay but his current line apparently still is not appropriate for an ethanol lock  5. Check C diff if diarrhea doesn't improve.  .    Return to ID clinic PRN.    Марина Buckner MD  Division of Infectious Diseases and International Medicine  Pager: [phone]           ________________________________________________________________           History of Present Illness:   Patient is a 48 yo M with a history of bariatric surgery on chronic TPN, recurrent CLABSI, short-gut syndrome, and a hx of MRSE bacteremia, strep bacteremia, and corynebacteria bacteremia who presents today with 2 days of Fever wit G+ rods found on blood culture.    Patient states that he has had over 20 episodes of bacteremia over the past 4-5 years. The most recent episode, for which he presented to the hospital this time, started on 8/27. Patient called the [facility] b/c of fever, chills, and night-sweats that had occurred for over 2 days. He would check his temperature at home. And it would fluctuate between 100.x up to a Tmax of 101.9. After calling the [facility], he went to have blood cultures drawn per their guidance. On  8/30, the blood cultures came back positive growing G+ rods. Patient was informed to come to the ED for continued management. Per the ED note the blood cultures were positive via peripheral draw (negative x2 from central line port).    Patient lives in [facility] in a home on a 3-acre property. He states he likes to walk in the woods on his property for leisure. Patient often notices ticks, but has not noticed any tick bites. Patient has multiple dogs at home. Patient lives with wife and kids. Nobody in his family has been sick. Patient states his reticular rash on his upper extremity is secondary to his time spent in the hot-tub.    Patient today endorses mild headache with resolved fevers, chills, night sweats, and diarrhea. Prior to hospitalization, patient stated he experienced fevers, chills, night sweats, headache, joint pain, sore throat, palpitations, diarrhea and nausea/vomiting, and abdominal pain that is chronic s/p surgery. Patient endorsed no chest pain, dyspnea, cough, dizziness, dysuria, or hematuria.    Patient also  stated during re-examination that he has noted drainage from his Cm line with clots and granular material.     Summary of Recent Hospitalizations:  8/3/2020 = repeat dual-lumen Cm replaced.  7/28-8/3/20 - treated for MRSE bactermia secondary to central venous catheter; repeat dual-lumen Cm replaced  July 2020 - treated for Corynebacterium infection with Vancomycin  11/4/19 - peripheral cultures negative; but initial cultures grew Corneybacterium and Peptostreptococcus which were deemed contaminants.  10/4/19 - Strep, Staph, and Granulicatella adiacens bactermia; Cm replaced on 10/1  5/16/19 - blood draw from PICC grew Rothia mucilaginosa  1/9/19 - Candidemia sepsis due to PICC line infection.

## 2020-09-24 NOTE — TELEPHONE ENCOUNTER
Received call from patient requesting refill(s) of fentaNYL (DURAGESIC) 25 mcg/hr 72 hr patch and oxyCODONE (ROXICODONE) 5 MG/5ML solution    Last dispensed from pharmacy on 09/02/20-fentanyl          09/08/20-oxycodone    Pt last seen by prescribing provider on 06/22/20-virtual visit  Next appt scheduled for 10/28/20-in person- note for UDS and OA added     checked in the past 6 months? Yes If no, print current report and give to RN    Last urine drug screen date 09/11/19  Current opioid agreement on file (completed within the last year) No Date of opioid agreement: 09/11/19    E-prescribe to pharmacy-[facility] [place] - [place], [place] - [address]     Will route to nursing pool for review and preparation of prescription(s).

## 2020-09-25 NOTE — TELEPHONE ENCOUNTER
Ok to get him a visit next week to examine him, if acute overwhelming pain will need to see the ER.    Ok for same day spot.

## 2020-09-25 NOTE — TELEPHONE ENCOUNTER
Attempted to contact patient, phone just rang and rang and no voicemail picked up. Will send [employer] message.

## 2020-09-25 NOTE — TELEPHONE ENCOUNTER
Called for a mL and patch  count due to last months hospitalization and they report Parker has 340 mL (17 days) of his oxyCODONE (ROXICODONE) 5 MG/5ML solution and 6 patches (12 days) plus the one he put on today of  fentaNYL (DURAGESIC) 25 mcg/hr 72 hr patch     Medication refill information reviewed.     Due date for fentaNYL (DURAGESIC) 25 mcg/hr 72 hr patch is 10/9/20 and oxyCODONE (ROXICODONE) 5 MG/5ML solution is 10/12/20     Prescriptions prepped for review.     Will route to provider.

## 2020-09-28 ENCOUNTER — TELEPHONE (OUTPATIENT)
Dept: RESPIRATORY THERAPY | Facility: CLINIC | Age: 50
End: 2020-09-28

## 2020-09-28 RX ORDER — ONDANSETRON 8 MG/1
TABLET, ORALLY DISINTEGRATING ORAL
Qty: 90 TABLET | Refills: 1 | Status: SHIPPED | OUTPATIENT
Start: 2020-09-28 | End: 2021-05-05

## 2020-09-28 RX ORDER — FENTANYL 25 UG/1
1 PATCH TRANSDERMAL
Qty: 15 PATCH | Refills: 0 | Status: SHIPPED | OUTPATIENT
Start: 2020-09-28 | End: 2020-11-06

## 2020-09-28 RX ORDER — LORAZEPAM 1 MG/1
TABLET ORAL
Qty: 30 TABLET | Refills: 0 | Status: SHIPPED | OUTPATIENT
Start: 2020-09-28 | End: 2020-10-26

## 2020-09-28 RX ORDER — OXYCODONE HCL 5 MG/5 ML
5-10 SOLUTION, ORAL ORAL 2 TIMES DAILY PRN
Qty: 600 ML | Refills: 0 | Status: SHIPPED | OUTPATIENT
Start: 2020-09-28 | End: 2020-11-06

## 2020-09-28 RX ORDER — DEXTROAMPHETAMINE SACCHARATE, AMPHETAMINE ASPARTATE, DEXTROAMPHETAMINE SULFATE AND AMPHETAMINE SULFATE 5; 5; 5; 5 MG/1; MG/1; MG/1; MG/1
TABLET ORAL
Qty: 30 TABLET | Refills: 0 | Status: SHIPPED | OUTPATIENT
Start: 2020-09-28 | End: 2020-10-26

## 2020-09-28 NOTE — TELEPHONE ENCOUNTER
Script Eprescribed to pharmacy    Will send this to MA team to notify patient.    Signed Prescriptions:                        Disp   Refills    fentaNYL (DURAGESIC) 25 mcg/hr 72 hr patch 15 pat*0        Sig: Place 1 patch onto the skin every 48 hours remove old           patch.   May fill 10/7/20 and start 10/9/20  Authorizing Provider: BRANDYN JENKINS    oxyCODONE (ROXICODONE) 5 MG/5ML solution   600 mL 0        Sig: Take 5-10 mLs (5-10 mg) by mouth 2 times daily as           needed for pain Max of 20mg/day. Fill 10/10/20 to           start on/after 10/12/20. 30 day supply.  Authorizing Provider: BRANDYN JENKINS MD  [facility]

## 2020-09-28 NOTE — TELEPHONE ENCOUNTER
Attempted to reach Parker today for ongoing smoking cessation counseling. There was no answer so a message was left that included our number should he need to contact us prior to our next call.    YADIRA Alfredo, RRT, CTTS  Chronic Pulmonary Disease Specialist  Office: [phone]   Pager: [phone]

## 2020-09-29 ENCOUNTER — MYC MEDICAL ADVICE (OUTPATIENT)
Dept: PALLIATIVE MEDICINE | Facility: CLINIC | Age: 50
End: 2020-09-29

## 2020-09-29 ENCOUNTER — MEDICAL CORRESPONDENCE (OUTPATIENT)
Dept: HEALTH INFORMATION MANAGEMENT | Facility: CLINIC | Age: 50
End: 2020-09-29

## 2020-09-29 ENCOUNTER — HOSPITAL ENCOUNTER (OUTPATIENT)
Dept: LAB | Facility: CLINIC | Age: 50
Discharge: HOME | End: 2020-09-29
Attending: SURGERY | Admitting: SURGERY
Payer: COMMERCIAL

## 2020-09-29 ENCOUNTER — HOME INFUSION (PRE-WILLOW HOME INFUSION) (OUTPATIENT)
Dept: PHARMACY | Facility: CLINIC | Age: 50
End: 2020-09-29

## 2020-09-29 LAB
ALBUMIN SERPL-MCNC: 3.5 G/DL (ref 3.4–5)
ALP SERPL-CCNC: 77 U/L (ref 40–150)
ALT SERPL W P-5'-P-CCNC: 20 U/L (ref 0–70)
ANION GAP SERPL CALCULATED.3IONS-SCNC: 6 MMOL/L (ref 3–14)
AST SERPL W P-5'-P-CCNC: 14 U/L (ref 0–45)
BASOPHILS # BLD AUTO: 0.1 10E9/L (ref 0–0.2)
BASOPHILS NFR BLD AUTO: 0.7 %
BILIRUB DIRECT SERPL-MCNC: 0.2 MG/DL (ref 0–0.2)
BILIRUB SERPL-MCNC: 0.4 MG/DL (ref 0.2–1.3)
BUN SERPL-MCNC: 11 MG/DL (ref 7–30)
CALCIUM SERPL-MCNC: 8.7 MG/DL (ref 8.5–10.1)
CHLORIDE SERPL-SCNC: 109 MMOL/L (ref 94–109)
CO2 SERPL-SCNC: 28 MMOL/L (ref 20–32)
CREAT SERPL-MCNC: 0.69 MG/DL (ref 0.66–1.25)
DIFFERENTIAL METHOD BLD: ABNORMAL
EOSINOPHIL NFR BLD AUTO: 1.6 %
ERYTHROCYTE [DISTWIDTH] IN BLOOD BY AUTOMATED COUNT: 17.2 % (ref 10–15)
GFR SERPL CREATININE-BSD FRML MDRD: >90 ML/MIN/{1.73_M2}
GLUCOSE SERPL-MCNC: 83 MG/DL (ref 70–99)
HCT VFR BLD AUTO: 37.3 % (ref 40–53)
HGB BLD-MCNC: 11.9 G/DL (ref 13.3–17.7)
IMM GRANULOCYTES # BLD: 0 10E9/L (ref 0–0.4)
IMM GRANULOCYTES NFR BLD: 0.3 %
LYMPHOCYTES # BLD AUTO: 2.7 10E9/L (ref 0.8–5.3)
LYMPHOCYTES NFR BLD AUTO: 25.8 %
MAGNESIUM SERPL-MCNC: 2.4 MG/DL (ref 1.6–2.3)
MCH RBC QN AUTO: 28.4 PG (ref 26.5–33)
MCHC RBC AUTO-ENTMCNC: 31.9 G/DL (ref 31.5–36.5)
MCV RBC AUTO: 89 FL (ref 78–100)
MONOCYTES # BLD AUTO: 1.2 10E9/L (ref 0–1.3)
MONOCYTES NFR BLD AUTO: 11.6 %
NEUTROPHILS # BLD AUTO: 6.3 10E9/L (ref 1.6–8.3)
NEUTROPHILS NFR BLD AUTO: 60 %
NRBC # BLD AUTO: 0 10*3/UL
NRBC BLD AUTO-RTO: 0 /100
PHOSPHATE SERPL-MCNC: 4.3 MG/DL (ref 2.5–4.5)
PLATELET # BLD AUTO: 232 10E9/L (ref 150–450)
POTASSIUM SERPL-SCNC: 4 MMOL/L (ref 3.4–5.3)
PROT SERPL-MCNC: 6.7 G/DL (ref 6.8–8.8)
RBC # BLD AUTO: 4.19 10E12/L (ref 4.4–5.9)
SODIUM SERPL-SCNC: 143 MMOL/L (ref 133–144)
TRIGL SERPL-MCNC: 111 MG/DL
WBC # BLD AUTO: 10.5 10E9/L (ref 4–11)

## 2020-09-29 PROCEDURE — 82248 BILIRUBIN DIRECT: CPT | Performed by: SURGERY

## 2020-09-29 PROCEDURE — 85025 COMPLETE CBC W/AUTO DIFF WBC: CPT | Performed by: SURGERY

## 2020-09-29 PROCEDURE — 84100 ASSAY OF PHOSPHORUS: CPT | Performed by: SURGERY

## 2020-09-29 PROCEDURE — 84478 ASSAY OF TRIGLYCERIDES: CPT | Mod: GZ | Performed by: SURGERY

## 2020-09-29 PROCEDURE — 80053 COMPREHEN METABOLIC PANEL: CPT | Performed by: SURGERY

## 2020-09-29 PROCEDURE — 83735 ASSAY OF MAGNESIUM: CPT | Performed by: SURGERY

## 2020-09-30 ENCOUNTER — TELEPHONE (OUTPATIENT)
Dept: INTERNAL MEDICINE | Facility: CLINIC | Age: 50
End: 2020-09-30

## 2020-09-30 ENCOUNTER — HOME INFUSION (PRE-WILLOW HOME INFUSION) (OUTPATIENT)
Dept: PHARMACY | Facility: CLINIC | Age: 50
End: 2020-09-30

## 2020-09-30 DIAGNOSIS — Z53.9 DIAGNOSIS NOT YET DEFINED: Primary | ICD-10-CM

## 2020-09-30 DIAGNOSIS — K59.01 SLOW TRANSIT CONSTIPATION: ICD-10-CM

## 2020-09-30 DIAGNOSIS — Z87.891 FORMER SMOKER: Primary | ICD-10-CM

## 2020-09-30 DIAGNOSIS — G89.4 CHRONIC PAIN SYNDROME: ICD-10-CM

## 2020-09-30 DIAGNOSIS — R78.81 GRAM-POSITIVE BACTEREMIA: ICD-10-CM

## 2020-09-30 PROCEDURE — G0180 MD CERTIFICATION HHA PATIENT: HCPCS | Performed by: INTERNAL MEDICINE

## 2020-09-30 RX ORDER — NICOTINE 21 MG/24HR
1 PATCH, TRANSDERMAL 24 HOURS TRANSDERMAL EVERY 24 HOURS
Qty: 30 PATCH | Refills: 0 | COMMUNITY
Start: 2020-09-30 | End: 2021-06-08

## 2020-09-30 RX ORDER — POLYETHYLENE GLYCOL 3350 17 G/17G
1 POWDER, FOR SOLUTION ORAL DAILY
Qty: 72 PACKET | Refills: 0 | COMMUNITY
Start: 2020-09-30 | End: 2020-11-06

## 2020-09-30 RX ORDER — LIDOCAINE 4 G/G
1 PATCH TOPICAL EVERY 24 HOURS
Qty: 30 PATCH | Refills: 0 | COMMUNITY
Start: 2020-09-30 | End: 2021-08-16

## 2020-09-30 RX ORDER — CEFAZOLIN SODIUM 1 G/50ML
1250 SOLUTION INTRAVENOUS EVERY 12 HOURS
Qty: 1 EACH | Refills: 0 | COMMUNITY
Start: 2020-09-30 | End: 2020-11-09

## 2020-09-30 RX ORDER — POLYETHYLENE GLYCOL 3350 17 G
2 POWDER IN PACKET (EA) ORAL
Qty: 30 LOZENGE | Refills: 0 | COMMUNITY
Start: 2020-09-30 | End: 2021-02-22

## 2020-09-30 NOTE — TELEPHONE ENCOUNTER
Reason for Call:  Form, our goal is to have forms completed with 72 hours, however, some forms may require a visit or additional information.    Type of letter, form or note:  Home Health Certification    Who is the form from?: Home care    Where did the form come from: form was faxed in    What clinic location was the form placed at?: [facility]    Where the form was placed: Given to MA/RN    What number is listed as a contact on the form?:        Additional comments:     Call taken on 9/30/2020 at 8:56 AM by Teetee Parada

## 2020-09-30 NOTE — TELEPHONE ENCOUNTER
Medication reconciliation completed by RN. Form and chart forwarded to PCP for signatures.    Ella Hammond RN

## 2020-10-01 NOTE — PROGRESS NOTES
This is a recent snapshot of the patient's [facility] medical record.  For current drug dose and complete information and questions, call [phone] or In Basket pool, fv home infusion ([id number])  CSN Number:  [id number]

## 2020-10-07 ENCOUNTER — HOSPITAL ENCOUNTER (OUTPATIENT)
Facility: CLINIC | Age: 50
End: 2020-10-07
Attending: INTERNAL MEDICINE | Admitting: INTERNAL MEDICINE
Payer: COMMERCIAL

## 2020-10-07 DIAGNOSIS — K31.1 GASTRIC OUTLET OBSTRUCTION: ICD-10-CM

## 2020-10-08 ENCOUNTER — TELEPHONE (OUTPATIENT)
Dept: ENDOCRINOLOGY | Facility: CLINIC | Age: 50
End: 2020-10-08

## 2020-10-08 NOTE — TELEPHONE ENCOUNTER
Left VM message that surgery which was scheduled on October 27 had to be moved to November 11 due to Dr. Bach's unavailability. Discussed COVID-19 testing 4 days prior to surgery date.   Patient has my direct call back number if questions.

## 2020-10-10 DIAGNOSIS — Z11.59 ENCOUNTER FOR SCREENING FOR OTHER VIRAL DISEASES: Primary | ICD-10-CM

## 2020-10-11 ENCOUNTER — MYC REFILL (OUTPATIENT)
Dept: CARE COORDINATION | Facility: CLINIC | Age: 50
End: 2020-10-11

## 2020-10-11 DIAGNOSIS — Z98.84 S/P BARIATRIC SURGERY: ICD-10-CM

## 2020-10-12 ENCOUNTER — HOSPITAL ENCOUNTER (OUTPATIENT)
Dept: LAB | Facility: CLINIC | Age: 50
Discharge: HOME | End: 2020-10-12
Attending: SURGERY | Admitting: SURGERY
Payer: COMMERCIAL

## 2020-10-12 ENCOUNTER — MEDICAL CORRESPONDENCE (OUTPATIENT)
Dept: HEALTH INFORMATION MANAGEMENT | Facility: CLINIC | Age: 50
End: 2020-10-12

## 2020-10-12 LAB
ALBUMIN SERPL-MCNC: 3.5 G/DL (ref 3.4–5)
ALP SERPL-CCNC: 79 U/L (ref 40–150)
ALT SERPL W P-5'-P-CCNC: 18 U/L (ref 0–70)
ANION GAP SERPL CALCULATED.3IONS-SCNC: 5 MMOL/L (ref 3–14)
AST SERPL W P-5'-P-CCNC: 15 U/L (ref 0–45)
BASOPHILS # BLD AUTO: 0.1 10E9/L (ref 0–0.2)
BASOPHILS NFR BLD AUTO: 0.9 %
BILIRUB DIRECT SERPL-MCNC: 0.1 MG/DL (ref 0–0.2)
BILIRUB SERPL-MCNC: 0.6 MG/DL (ref 0.2–1.3)
BUN SERPL-MCNC: 9 MG/DL (ref 7–30)
CALCIUM SERPL-MCNC: 8.6 MG/DL (ref 8.5–10.1)
CHLORIDE SERPL-SCNC: 109 MMOL/L (ref 94–109)
CO2 SERPL-SCNC: 29 MMOL/L (ref 20–32)
CREAT SERPL-MCNC: 0.97 MG/DL (ref 0.66–1.25)
DIFFERENTIAL METHOD BLD: ABNORMAL
EOSINOPHIL NFR BLD AUTO: 3.9 %
ERYTHROCYTE [DISTWIDTH] IN BLOOD BY AUTOMATED COUNT: 15.9 % (ref 10–15)
GFR SERPL CREATININE-BSD FRML MDRD: >90 ML/MIN/{1.73_M2}
GLUCOSE SERPL-MCNC: 79 MG/DL (ref 70–99)
HCT VFR BLD AUTO: 32.6 % (ref 40–53)
HGB BLD-MCNC: 10.4 G/DL (ref 13.3–17.7)
IMM GRANULOCYTES # BLD: 0 10E9/L (ref 0–0.4)
IMM GRANULOCYTES NFR BLD: 0.3 %
LYMPHOCYTES # BLD AUTO: 1.6 10E9/L (ref 0.8–5.3)
LYMPHOCYTES NFR BLD AUTO: 27.9 %
MAGNESIUM SERPL-MCNC: 2.3 MG/DL (ref 1.6–2.3)
MCH RBC QN AUTO: 29.3 PG (ref 26.5–33)
MCHC RBC AUTO-ENTMCNC: 31.9 G/DL (ref 31.5–36.5)
MCV RBC AUTO: 92 FL (ref 78–100)
MONOCYTES # BLD AUTO: 0.7 10E9/L (ref 0–1.3)
MONOCYTES NFR BLD AUTO: 11.8 %
NEUTROPHILS # BLD AUTO: 3.2 10E9/L (ref 1.6–8.3)
NEUTROPHILS NFR BLD AUTO: 55.2 %
NRBC # BLD AUTO: 0 10*3/UL
NRBC BLD AUTO-RTO: 0 /100
PHOSPHATE SERPL-MCNC: 3.6 MG/DL (ref 2.5–4.5)
PLATELET # BLD AUTO: 159 10E9/L (ref 150–450)
POTASSIUM SERPL-SCNC: 3.6 MMOL/L (ref 3.4–5.3)
PROT SERPL-MCNC: 6.9 G/DL (ref 6.8–8.8)
RBC # BLD AUTO: 3.55 10E12/L (ref 4.4–5.9)
SODIUM SERPL-SCNC: 143 MMOL/L (ref 133–144)
TRIGL SERPL-MCNC: 54 MG/DL
WBC # BLD AUTO: 5.9 10E9/L (ref 4–11)

## 2020-10-12 PROCEDURE — 84100 ASSAY OF PHOSPHORUS: CPT | Performed by: SURGERY

## 2020-10-12 PROCEDURE — 80053 COMPREHEN METABOLIC PANEL: CPT | Performed by: SURGERY

## 2020-10-12 PROCEDURE — 84478 ASSAY OF TRIGLYCERIDES: CPT | Mod: GZ | Performed by: SURGERY

## 2020-10-12 PROCEDURE — 85025 COMPLETE CBC W/AUTO DIFF WBC: CPT | Mod: GZ | Performed by: SURGERY

## 2020-10-12 PROCEDURE — 82248 BILIRUBIN DIRECT: CPT | Performed by: SURGERY

## 2020-10-12 PROCEDURE — 83735 ASSAY OF MAGNESIUM: CPT | Performed by: SURGERY

## 2020-10-12 RX ORDER — PANTOPRAZOLE SODIUM 40 MG/1
40 TABLET, DELAYED RELEASE ORAL DAILY
Qty: 30 TABLET | Refills: 5 | OUTPATIENT
Start: 2020-10-12

## 2020-10-12 NOTE — TELEPHONE ENCOUNTER
"Denied  Requested Prescriptions   Pending Prescriptions Disp Refills     pantoprazole (PROTONIX) 40 MG EC tablet 30 tablet 5     Sig: Take 1 tablet (40 mg) by mouth daily       PPI Protocol Passed - 10/12/2020  2:30 PM        Passed - Not on Clopidogrel (unless Pantoprazole ordered)        Passed - No diagnosis of osteoporosis on record        Passed - Recent (12 mo) or future (30 days) visit within the authorizing provider's specialty     Patient has had an office visit with the authorizing provider or a provider within the authorizing providers department within the previous 12 mos or has a future within next 30 days. See \"Patient Info\" tab in inbasket, or \"Choose Columns\" in Meds & Orders section of the refill encounter.              Passed - Medication is active on med list        Passed - Patient is age 18 or older         sent 8/31/2020 for 1 month and 5 refills to this pharmacy  Ella Hammond RN      "

## 2020-10-14 ENCOUNTER — HOME INFUSION (PRE-WILLOW HOME INFUSION) (OUTPATIENT)
Dept: PHARMACY | Facility: CLINIC | Age: 50
End: 2020-10-14

## 2020-10-14 ENCOUNTER — TELEPHONE (OUTPATIENT)
Dept: INTERNAL MEDICINE | Facility: CLINIC | Age: 50
End: 2020-10-14

## 2020-10-14 NOTE — TELEPHONE ENCOUNTER
Reason for Call:  Form, our goal is to have forms completed with 72 hours, however, some forms may require a visit or additional information.    Type of letter, form or note:  Home Health Certification    Who is the form from?: Home care    Where did the form come from: form was faxed in    What clinic location was the form placed at?: [facility]    Where the form was placed: Given to MA/RN    What number is listed as a contact on the form?: [phone]       Additional comments:     Call taken on 10/14/2020 at 2:13 PM by Danielle Solis

## 2020-10-15 ENCOUNTER — PATIENT OUTREACH (OUTPATIENT)
Dept: NURSING | Facility: CLINIC | Age: 50
End: 2020-10-15
Payer: COMMERCIAL

## 2020-10-15 ASSESSMENT — ACTIVITIES OF DAILY LIVING (ADL): DEPENDENT_IADLS:: MEDICATION MANAGEMENT;TRANSPORTATION;SHOPPING

## 2020-10-15 NOTE — PROGRESS NOTES
Clinic Care Coordination Contact    Follow Up Progress Note      Assessment: RN CC spoke with spouse (consent to communicate on file) for follow up.  Patient is doing well.  Patient had been experiencing diarrhea, however, it stopped once they obtained the testing kit.  Patient is working with infectious disease on obtaining a new line for next time (silicone line per spouse) so they can use an ethanol lock to prevent future infections.  Patient has future surgery planned for G-tube replacement 11/11/20.    Goals addressed this encounter:   Goals Addressed                 This Visit's Progress      #1 Medical (pt-stated)   20%     Goal Statement: I want to prevent hospital visits  Date Goal set: 9/9/2020   Barriers: on chronic TPN, IV line  Strengths: home infusion, care coordination  Date to Achieve By: 12/31/20  Patient expressed understanding of goal: yes  Action steps to achieve this goal:  1. I will complete IV antibiotics as prescribed. (completed)  2. I will follow up with infectious disease provider as scheduled 9/24/20. (completed)  3. I will stay at home during COVID-19 pandemic.   4. I will monitor my temperature daily as instructed and contact [facility] for elevated temperature parameters.  5. I will have G-tube replaced as scheduled. (surgery scheduled 11/11/20)        #2 Medical (pt-stated)   0%     Goal Statement: I will schedule and attend an appointment with cardiology.  Date Goal set: 10/7/19  Barriers: COVID-19 pandemic, multiple specialists  Strengths: support from spouse  Date to Achieve By: 11/1/20  Patient expressed understanding of goal: yes  Action steps to achieve this goal:  1. I will schedule an appointment with cardiology  2. I will attend appointment with cardiology             COMPLETED: Medical (pt-stated)        Goal Statement: I will schedule a routine exam with Dr. Isaac  Date Goal set: 7/10/2020   Barriers: COVID-19 pandemic  Strengths: support from spouse, care  coordination  Date to Achieve By: 12/1/20  Patient expressed understanding of goal: yes  Action steps to achieve this goal:  1. I will schedule an appointment with Dr. Isaac (completed)  2. I will attend appointment with Dr. Isaac (scheduled for 9/14/20)               Intervention/Education provided during outreach: RN CC reviewed plan of care and need for cardiology appointment.     Outreach Frequency: monthly    Plan:   1. Patient and spouse will work on scheduling follow up cardiology appointment.  2. Patient will continue to work with infectious disease on new line once needed.  3. RN Care Coordinator will follow up in 1 month.    Melissa Behl BSN, RN, PHN, CCM  Primary Care Clinical RN Care Coordinator  [facility]   [phone]

## 2020-10-15 NOTE — PROGRESS NOTES
This is a recent snapshot of the patient's [facility] medical record.  For current drug dose and complete information and questions, call [phone] or In Basket pool, fv home infusion ([id number])  CSN Number:  [id number]

## 2020-10-19 DIAGNOSIS — Z53.9 DIAGNOSIS NOT YET DEFINED: Primary | ICD-10-CM

## 2020-10-19 PROCEDURE — G0179 MD RECERTIFICATION HHA PT: HCPCS | Performed by: INTERNAL MEDICINE

## 2020-10-23 ENCOUNTER — TELEPHONE (OUTPATIENT)
Dept: RESPIRATORY THERAPY | Facility: CLINIC | Age: 50
End: 2020-10-23

## 2020-10-23 NOTE — TELEPHONE ENCOUNTER
Attempted to contact Parker today for ongoing smoking cessation counseling. He was unavailable so spoke with his wife. She shared that Parker is still smoking but has cut down to 3 cigarettes a day. He continues to use the nicotine patch. She shared that he has been anxious lately due to his impending surgery on 11/11/2020. I asked her to share with Parker how proud I am for his efforts on reducing the number of cigarettes he smokes daily and to let him know that we attempted to contact. I let her know that I will check in with him when he is admitted for his 11/11/2020 surgery.    Mildred Webb, YADIRA, RRT, CTTS  Chronic Pulmonary Disease Specialist  Office: [phone]   Pager: [phone]

## 2020-10-26 ENCOUNTER — MYC REFILL (OUTPATIENT)
Dept: FAMILY MEDICINE | Facility: CLINIC | Age: 50
End: 2020-10-26

## 2020-10-26 DIAGNOSIS — Z98.84 S/P BARIATRIC SURGERY: ICD-10-CM

## 2020-10-26 DIAGNOSIS — R11.0 NAUSEA: ICD-10-CM

## 2020-10-26 DIAGNOSIS — Z98.84 GASTRIC BYPASS STATUS FOR OBESITY: ICD-10-CM

## 2020-10-26 DIAGNOSIS — F90.0 ADHD (ATTENTION DEFICIT HYPERACTIVITY DISORDER), INATTENTIVE TYPE: ICD-10-CM

## 2020-10-26 DIAGNOSIS — E55.9 VITAMIN D DEFICIENCY: ICD-10-CM

## 2020-10-26 RX ORDER — PANTOPRAZOLE SODIUM 40 MG/1
40 TABLET, DELAYED RELEASE ORAL DAILY
Qty: 30 TABLET | Refills: 5 | Status: CANCELLED | OUTPATIENT
Start: 2020-10-26

## 2020-10-26 RX ORDER — ONDANSETRON 8 MG/1
TABLET, ORALLY DISINTEGRATING ORAL
Qty: 90 TABLET | Refills: 1 | Status: CANCELLED | OUTPATIENT
Start: 2020-10-26

## 2020-10-27 ENCOUNTER — MYC MEDICAL ADVICE (OUTPATIENT)
Dept: PALLIATIVE MEDICINE | Facility: CLINIC | Age: 50
End: 2020-10-27

## 2020-10-27 ENCOUNTER — HOSPITAL ENCOUNTER (OUTPATIENT)
Dept: LAB | Facility: CLINIC | Age: 50
Discharge: HOME | End: 2020-10-27
Attending: INTERNAL MEDICINE | Admitting: INTERNAL MEDICINE
Payer: COMMERCIAL

## 2020-10-27 LAB
ALBUMIN SERPL-MCNC: 3.2 G/DL (ref 3.4–5)
ALP SERPL-CCNC: 77 U/L (ref 40–150)
ALT SERPL W P-5'-P-CCNC: 23 U/L (ref 0–70)
ANION GAP SERPL CALCULATED.3IONS-SCNC: 8 MMOL/L (ref 3–14)
AST SERPL W P-5'-P-CCNC: 17 U/L (ref 0–45)
BASOPHILS # BLD AUTO: 0.1 10E9/L (ref 0–0.2)
BASOPHILS NFR BLD AUTO: 1.1 %
BILIRUB DIRECT SERPL-MCNC: <0.1 MG/DL (ref 0–0.2)
BILIRUB SERPL-MCNC: 0.3 MG/DL (ref 0.2–1.3)
BUN SERPL-MCNC: 12 MG/DL (ref 7–30)
CALCIUM SERPL-MCNC: 8.7 MG/DL (ref 8.5–10.1)
CHLORIDE SERPL-SCNC: 110 MMOL/L (ref 94–109)
CO2 SERPL-SCNC: 25 MMOL/L (ref 20–32)
CREAT SERPL-MCNC: 0.66 MG/DL (ref 0.66–1.25)
DIFFERENTIAL METHOD BLD: ABNORMAL
EOSINOPHIL NFR BLD AUTO: 3.6 %
ERYTHROCYTE [DISTWIDTH] IN BLOOD BY AUTOMATED COUNT: 15.3 % (ref 10–15)
GFR SERPL CREATININE-BSD FRML MDRD: >90 ML/MIN/{1.73_M2}
GLUCOSE SERPL-MCNC: 76 MG/DL (ref 70–99)
HCT VFR BLD AUTO: 36.8 % (ref 40–53)
HGB BLD-MCNC: 11.7 G/DL (ref 13.3–17.7)
IMM GRANULOCYTES # BLD: 0 10E9/L (ref 0–0.4)
IMM GRANULOCYTES NFR BLD: 0.2 %
LYMPHOCYTES # BLD AUTO: 1.8 10E9/L (ref 0.8–5.3)
LYMPHOCYTES NFR BLD AUTO: 32.8 %
MAGNESIUM SERPL-MCNC: 2.2 MG/DL (ref 1.6–2.3)
MCH RBC QN AUTO: 28.1 PG (ref 26.5–33)
MCHC RBC AUTO-ENTMCNC: 31.8 G/DL (ref 31.5–36.5)
MCV RBC AUTO: 89 FL (ref 78–100)
MONOCYTES # BLD AUTO: 0.8 10E9/L (ref 0–1.3)
MONOCYTES NFR BLD AUTO: 13.8 %
NEUTROPHILS # BLD AUTO: 2.7 10E9/L (ref 1.6–8.3)
NEUTROPHILS NFR BLD AUTO: 48.5 %
NRBC # BLD AUTO: 0 10*3/UL
NRBC BLD AUTO-RTO: 0 /100
PHOSPHATE SERPL-MCNC: 2.7 MG/DL (ref 2.5–4.5)
PLATELET # BLD AUTO: 221 10E9/L (ref 150–450)
POTASSIUM SERPL-SCNC: 3.5 MMOL/L (ref 3.4–5.3)
PROT SERPL-MCNC: 6.9 G/DL (ref 6.8–8.8)
RBC # BLD AUTO: 4.16 10E12/L (ref 4.4–5.9)
SODIUM SERPL-SCNC: 143 MMOL/L (ref 133–144)
TRIGL SERPL-MCNC: 104 MG/DL
WBC # BLD AUTO: 5.6 10E9/L (ref 4–11)

## 2020-10-27 PROCEDURE — 82248 BILIRUBIN DIRECT: CPT | Performed by: INTERNAL MEDICINE

## 2020-10-27 PROCEDURE — 83735 ASSAY OF MAGNESIUM: CPT | Performed by: INTERNAL MEDICINE

## 2020-10-27 PROCEDURE — 84100 ASSAY OF PHOSPHORUS: CPT | Performed by: INTERNAL MEDICINE

## 2020-10-27 PROCEDURE — 84478 ASSAY OF TRIGLYCERIDES: CPT | Performed by: INTERNAL MEDICINE

## 2020-10-27 PROCEDURE — 85025 COMPLETE CBC W/AUTO DIFF WBC: CPT | Mod: GZ | Performed by: INTERNAL MEDICINE

## 2020-10-27 PROCEDURE — 80053 COMPREHEN METABOLIC PANEL: CPT | Performed by: INTERNAL MEDICINE

## 2020-10-27 RX ORDER — ERGOCALCIFEROL 1.25 MG/1
50000 CAPSULE, LIQUID FILLED ORAL WEEKLY
Qty: 12 CAPSULE | Refills: 3 | OUTPATIENT
Start: 2020-10-27

## 2020-10-27 NOTE — TELEPHONE ENCOUNTER
Routing refill request to provider for review/approval because:  Drug not on the [facility] refill protocol   Last office visit 2020    Requested Prescriptions   Pending Prescriptions Disp Refills     vitamin D2 (ERGOCALCIFEROL) 68842 units (1250 mcg) capsule 12 capsule 3     Sig: Take 1 capsule (50,000 Units) by mouth once a week       There is no refill protocol information for this order   Filled 2020 for 12 capsules and 3 refills  Denied  Refills current at this pharmacy       lidocaine, alum & mag hydroxide-simethicone GI Cocktail 1 Bottle 1     Si ml daily as needed for mouth/stomach pain.       There is no refill protocol information for this order   Filled 2020 for 1 bottle and 1 refill         LORazepam (ATIVAN) 1 MG tablet 30 tablet 0     Sig: TAKE 1 TABLET (1MG) BY MOUTH AS NEEDED FOR ANXIETY WITH TPN AND MEDICATIONS . JUST ONCE A DAY (30 TO LAST 30 DAYS)       There is no refill protocol information for this order   Filled on 2020 for 30 tablets       amphetamine-dextroamphetamine (ADDERALL) 20 MG tablet 30 tablet 0     Sig: TAKE ONE TABLET BY MOUTH ONCE DAILY       There is no refill protocol information for this order      Filled on 2020 for 30 tablets    Ella Hammond RN

## 2020-10-28 ENCOUNTER — HOME INFUSION (PRE-WILLOW HOME INFUSION) (OUTPATIENT)
Dept: PHARMACY | Facility: CLINIC | Age: 50
End: 2020-10-28

## 2020-10-28 RX ORDER — LORAZEPAM 1 MG/1
TABLET ORAL
Qty: 30 TABLET | Refills: 0 | Status: SHIPPED | OUTPATIENT
Start: 2020-10-28 | End: 2020-11-23

## 2020-10-28 RX ORDER — DEXTROAMPHETAMINE SACCHARATE, AMPHETAMINE ASPARTATE, DEXTROAMPHETAMINE SULFATE AND AMPHETAMINE SULFATE 5; 5; 5; 5 MG/1; MG/1; MG/1; MG/1
TABLET ORAL
Qty: 30 TABLET | Refills: 0 | Status: SHIPPED | OUTPATIENT
Start: 2020-10-28 | End: 2020-11-23

## 2020-10-28 NOTE — TELEPHONE ENCOUNTER
Parker is scheduled for a video visit on 11/6/20 to discuss medication and his upcoming surgery scheduled for 11/11/20. Cammy last appt was 6/22/20 so he is due to be seen. He is also scheduled for an in person appointment in December in [place] to update his UDS and OA.     YADIRA LazarN, RN  Care Coordinator  [facility] [place]

## 2020-10-29 ENCOUNTER — TELEPHONE (OUTPATIENT)
Dept: INFECTIOUS DISEASES | Facility: CLINIC | Age: 50
End: 2020-10-29

## 2020-10-29 ENCOUNTER — HOME INFUSION (PRE-WILLOW HOME INFUSION) (OUTPATIENT)
Dept: PHARMACY | Facility: CLINIC | Age: 50
End: 2020-10-29

## 2020-10-29 PROCEDURE — 87040 BLOOD CULTURE FOR BACTERIA: CPT | Performed by: INTERNAL MEDICINE

## 2020-10-29 NOTE — PROGRESS NOTES
This is a recent snapshot of the patient's [facility] medical record.  For current drug dose and complete information and questions, call [phone] or In Basket pool, fv home infusion ([id number])  CSN Number:  [id number]

## 2020-10-29 NOTE — TELEPHONE ENCOUNTER
Orders are received.  Will arrange for home RN visit today to obtain cultures.  Patient and family aware of plan.

## 2020-10-29 NOTE — TELEPHONE ENCOUNTER
Марина Buckner MD Krieger, Lori, [facility]; [facility] Infectious Disease Adult Csc 1 hour ago (8:43 AM)     Yes, please obtain blood cultures from peripheral draw and from line. These should be obtained today. Please continue to encourage patient to present to ED if worsening fevers.     Марина Buckner MD   Infectious Diseases

## 2020-10-29 NOTE — TELEPHONE ENCOUNTER
Esther Buckner -     Patient's home nurse called to inform me that she was unable to collect blood peripherally for cultures today.  She did get blood from each lumen of his IV access though.     Jyothi Peraza, PharmD [facility]   Ph: [phone]  Fax: [phone]

## 2020-10-29 NOTE — TELEPHONE ENCOUNTER
Rose (wife) called to report fevers (100.8, 100.9). to [facility] Oncall Pharmacist on Wednesday 10/28/20 pm. Wife noted to pharmacist they are not going in to the ER,  but asked [facility] to get an order for blood cultures    Noted in [facility], patient reported fevers 101.2 to Dr. Buckner (ID) on 10/28/20.   - - Per [facility] visit 9/24/20 w/Dr. Buckner, noted MD plan for surveilance blood cultures if any more fevers.     - CBC results from 10/27/20 available in EPIC- WBC wnl    Please authorize order for [facility] to obtain surveilance blood cultures (peripheral and CVC) at home.    Thanks  Ligia Lewis [facility], [facility]  Pharmacist, [facility] Nutrition Support  [phone]

## 2020-10-30 NOTE — PROGRESS NOTES
.This is a recent snapshot of the patient's [facility] medical record.  For current drug dose and complete information and questions, call [phone] or In Basket pool, fv home infusion ([id number])  CSN Number:  [id number]

## 2020-11-01 ENCOUNTER — HOME INFUSION (PRE-WILLOW HOME INFUSION) (OUTPATIENT)
Dept: PHARMACY | Facility: CLINIC | Age: 50
End: 2020-11-01

## 2020-11-02 ENCOUNTER — HOME INFUSION (PRE-WILLOW HOME INFUSION) (OUTPATIENT)
Dept: PHARMACY | Facility: CLINIC | Age: 50
End: 2020-11-02

## 2020-11-02 DIAGNOSIS — Z98.84 S/P BARIATRIC SURGERY: ICD-10-CM

## 2020-11-02 RX ORDER — LIDOCAINE 40 MG/G
CREAM TOPICAL
Status: CANCELLED | OUTPATIENT
Start: 2020-11-02

## 2020-11-02 NOTE — PROGRESS NOTES
This is a recent snapshot of the patient's [facility] medical record.  For current drug dose and complete information and questions, call [phone] or In Basket pool, fv home infusion ([id number])  CSN Number:  [id number]

## 2020-11-02 NOTE — PROGRESS NOTES
This is a recent snapshot of the patient's [facility] medical record.  For current drug dose and complete information and questions, call [phone] or In Basket pool, fv home infusion ([id number])  CSN Number:  [id number]

## 2020-11-04 RX ORDER — PANTOPRAZOLE SODIUM 40 MG/1
40 TABLET, DELAYED RELEASE ORAL DAILY
Qty: 30 TABLET | Refills: 0 | OUTPATIENT
Start: 2020-11-04
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
## 2020-11-05 ENCOUNTER — HOME INFUSION (PRE-WILLOW HOME INFUSION) (OUTPATIENT)
Dept: PHARMACY | Facility: CLINIC | Age: 50
End: 2020-11-05

## 2020-11-06 ENCOUNTER — VIRTUAL VISIT (OUTPATIENT)
Dept: PALLIATIVE MEDICINE | Facility: CLINIC | Age: 50
End: 2020-11-06
Payer: COMMERCIAL

## 2020-11-06 ENCOUNTER — TELEPHONE (OUTPATIENT)
Dept: PALLIATIVE MEDICINE | Facility: CLINIC | Age: 50
End: 2020-11-06

## 2020-11-06 ENCOUNTER — MYC MEDICAL ADVICE (OUTPATIENT)
Dept: PALLIATIVE MEDICINE | Facility: CLINIC | Age: 50
End: 2020-11-06

## 2020-11-06 DIAGNOSIS — K59.01 SLOW TRANSIT CONSTIPATION: ICD-10-CM

## 2020-11-06 DIAGNOSIS — G89.29 CHRONIC ABDOMINAL PAIN: ICD-10-CM

## 2020-11-06 DIAGNOSIS — G89.4 CHRONIC PAIN SYNDROME: ICD-10-CM

## 2020-11-06 DIAGNOSIS — R10.9 CHRONIC ABDOMINAL PAIN: ICD-10-CM

## 2020-11-06 DIAGNOSIS — G89.29 CHRONIC ABDOMINAL PAIN: Primary | ICD-10-CM

## 2020-11-06 DIAGNOSIS — R10.9 CHRONIC ABDOMINAL PAIN: Primary | ICD-10-CM

## 2020-11-06 PROCEDURE — 99213 OFFICE O/P EST LOW 20 MIN: CPT | Mod: 95 | Performed by: PSYCHIATRY & NEUROLOGY

## 2020-11-06 RX ORDER — OXYCODONE HCL 5 MG/5 ML
5-10 SOLUTION, ORAL ORAL 3 TIMES DAILY PRN
Qty: 900 ML | Refills: 0 | Status: SHIPPED | OUTPATIENT
Start: 2020-11-06 | End: 2020-12-02

## 2020-11-06 RX ORDER — POLYETHYLENE GLYCOL 3350 17 G/17G
1 POWDER, FOR SOLUTION ORAL DAILY
Qty: 72 PACKET | Refills: 6 | Status: SHIPPED | OUTPATIENT
Start: 2020-11-06 | End: 2021-03-19

## 2020-11-06 RX ORDER — OXYCODONE HCL 5 MG/5 ML
5-10 SOLUTION, ORAL ORAL 3 TIMES DAILY PRN
Qty: 900 ML | Refills: 0 | Status: SHIPPED | OUTPATIENT
Start: 2020-11-06 | End: 2020-11-06

## 2020-11-06 RX ORDER — FENTANYL 25 UG/1
1 PATCH TRANSDERMAL
Qty: 15 PATCH | Refills: 0 | Status: SHIPPED | OUTPATIENT
Start: 2020-11-06 | End: 2020-12-02

## 2020-11-06 RX ORDER — POLYETHYLENE GLYCOL 3350 17 G/17G
1 POWDER, FOR SOLUTION ORAL DAILY
Qty: 72 PACKET | Refills: 6 | Status: SHIPPED | OUTPATIENT
Start: 2020-11-06 | End: 2020-11-06

## 2020-11-06 RX ORDER — FENTANYL 25 UG/1
1 PATCH TRANSDERMAL
Qty: 15 PATCH | Refills: 0 | Status: SHIPPED | OUTPATIENT
Start: 2020-11-06 | End: 2020-11-06

## 2020-11-06 ASSESSMENT — PAIN SCALES - GENERAL: PAINLEVEL: EXTREME PAIN (9)

## 2020-11-06 NOTE — PROGRESS NOTES
"Parker Acevedo is a 50 year old male who is being evaluated via a billable video visit.      The patient has been notified of following:     \"This video visit will be conducted via a call between you and your physician/provider. We have found that certain health care needs can be provided without the need for an in-person physical exam.  This service lets us provide the care you need with a video conversation.  If a prescription is necessary we can send it directly to your pharmacy.  If lab work is needed we can place an order for that and you can then stop by our lab to have the test done at a later time.    Video visits are billed at different rates depending on your insurance coverage.  Please reach out to your insurance provider with any questions.    If during the course of the call the physician/provider feels a video visit is not appropriate, you will not be charged for this service.\"    Patient has given verbal consent for Video visit? Yes  How would you like to obtain your AVS? MyChart  If you are dropped from the video visit, the video invite should be resent to: Text to cell phone: [phone]  Will anyone else be joining your video visit? No       Lyndsay Solo MA                                  [facility]    Date of visit: 11/6/2020     Chief complaint:    Chief Complaint   Patient presents with     Pain     Video visit due to COVID-19        Interval history:  Parker Acevedo was last seen by me on 6/22/2020     Recommendations/plan at the last visit included:  1. Physical therapy- not at this time with COVID  2. Advised him to reach out re: orthotics  3. Medications:  1. No changes.  Discussed safety re: azevedo and fentanyl patches, and use of hot tub  2. Refills for meds sent in  4. Follow up in 3 months- in person      Since his last visit, Parker Acevedo reports:  -he has no G tube right now, and   -he has been having a hard time due to the lack of G " "tube- has more pain   -wondering about going up short term on meds  -working with an infectious disease specialist.      Pain scores:  Average pain intensity  9/10    Current pain treatments:   Fentanyl 25mcg/hr patch q48 hours -  Previously was at 50mcg/hr  Lidocaine patches- as needed  Naloxone- has from previous hospitalization.  Oxycodone max of 20mg a day.    Previous medication treatments included:   Valium 5 mg/day, 1 tab in AM and PM-stopped in 11/2017  Tylenol #3   Vicodin   Tramadol   Diazepam- for sleep/anxiety  Gabapentin- bad headaches, acid reflux  Oxycodone max of 45mg/day.        Side Effects: no side effects    Medications:  Current Outpatient Medications   Medication Sig Dispense Refill     acetaminophen (TYLENOL) 32 mg/mL liquid Take 15.65 mLs (500 mg) by mouth every 4 hours as needed for fever or mild pain 455 mL 1     albuterol (PROAIR HFA/PROVENTIL HFA/VENTOLIN HFA) 108 (90 Base) MCG/ACT inhaler Inhale 2 puffs into the lungs every 4 hours as needed for shortness of breath / dyspnea or wheezing 1 Inhaler 1     alteplase 2 mg/2 mL (in 10 mL syringe) Inject 1 mg into the vein as needed       amphetamine-dextroamphetamine (ADDERALL) 20 MG tablet TAKE ONE TABLET BY MOUTH ONCE DAILY 30 tablet 0     carvedilol (COREG) 6.25 MG tablet Take 6.25 mg by mouth 2 times daily (with meals)       cyanocobalamin (CYANOCOBALAMIN) 1000 MCG/ML injection Inject 1 mL (1,000 mcg) into the muscle every 30 days 1 mL 11     diphenhydrAMINE (BENADRYL) 25 MG capsule Take 1 capsule (25 mg) by mouth every 8 hours 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  Phones are answered 24 hours a day 7 days a week 365 days a year.    Providers - Choose \"CONTINUE HOME MED (no script)\" at discharge if patient treatment with home " infusion will continue.       Lidocaine (LIDOCARE) 4 % Patch Place 1 patch onto the skin every 24 hours To prevent lidocaine toxicity, patient should be patch free for 12 hrs daily. 30 patch 0     lidocaine, alum & mag hydroxide-simethicone GI Cocktail 30 ml daily as needed for mouth/stomach pain. 1 Bottle 1     LORazepam (ATIVAN) 1 MG tablet TAKE 1 TABLET (1MG) BY MOUTH AS NEEDED FOR ANXIETY WITH TPN AND MEDICATIONS . JUST ONCE A DAY (30 TO LAST 30 DAYS) 30 tablet 0     nicotine (COMMIT) 2 MG lozenge Place 1 lozenge (2 mg) inside cheek every hour as needed for smoking cessation 30 lozenge 0     nicotine (NICODERM CQ) 21 MG/24HR 24 hr patch Place 1 patch onto the skin every 24 hours 30 patch 0     nystatin (MYCOSTATIN) 602300 UNIT/GM external cream Apply topically 2 times daily 30 g 0     ondansetron (ZOFRAN-ODT) 8 MG ODT tab DISSOLVE ONE TABLET ON TONGUE EVERY 8 HOURS AS NEEDED FOR NAUSEA 90 tablet 1     pantoprazole (PROTONIX) 40 MG EC tablet Take 1 tablet (40 mg) by mouth daily 30 tablet 5     parenteral nutrition - PTA/DISCHARGE ORDER Patient receives 2050 mL TPN every 14 hours through PICC at [facility].       sucralfate (CARAFATE) 1 GM/10ML suspension Take 1 g by mouth 4 times daily as needed       vitamin D2 (ERGOCALCIFEROL) 17880 units (1250 mcg) capsule Take 1 capsule (50,000 Units) by mouth once a week 12 capsule 3     fentaNYL (DURAGESIC) 25 mcg/hr 72 hr patch Place 1 patch onto the skin every 48 hours remove old patch.   May fill 11/7/20 and start 11/9/20 15 patch 0     naloxone (NARCAN) 4 MG/0.1ML nasal spray Spray 1 spray (4 mg) into one nostril alternating nostrils as needed for opioid reversal every 2-3 minutes until assistance arrives (Patient not taking: Reported on 11/9/2020) 0.2 mL 0     oxyCODONE (ROXICODONE) 5 MG/5ML solution Take 5-10 mLs (5-10 mg) by mouth 3 times daily as needed for pain Max of 30mg/day. Fill 11/9/20 to start on/after 11/11/20. 30 day supply. 900 mL 0      polyethylene glycol (MIRALAX) 17 g packet Take 17 g by mouth daily 72 packet 6       Medical History: any changes in medical history since they were last seen? No    Review of Systems:  The 14 system ROS was reviewed from the intake questionnaire, and is positive for bolded:   Constitutional: fever/chills, fatigue, weight gain, weight loss, infection  Eyes/Head: headache, dizziness  ENT: ringing in ears  Allergy/Immune: allergies  Skin: itching, rash, hives  Hematologic: easy bruising  Respiratory: cough, wheezing, shortness of breath  Cardiovascular: swelling in feet, fainting, palpitations, chest pain  GI: abdominal pain, nausea, vomiting, diarrhea, constipation  Endocrine: steroid use  Musculoskeletal:  joint pain, arthritis, stiffness, gout, back pain, neck pain  Urinary: frequency, urgency, incontinence, hesitancy  Neurologic: weakness, numbness/tingling, seizure, stroke, memory loss  Mental health: depression, anxiety, stress, suicidal ideation      THE 4 A's OF OPIOID MAINTENANCE ANALGESIA     Analgesia: adequate    Activity: on disability    Adverse effects: none    Adherence to Rx protocol: good- [place] Prescription Monitoring Program checked 11/6/20 appropriate    Last UDS and opioid agreement - 9/11/2019- unable to do today due to COVID      Assessment:   1. Chronic abdominal pain, with history of gastric bypass and later peptic ulcer   2. G tube placement- only used for venting  3. Myofascial abdominal pain, with significant guarding and poor posture  4. Opioid tolerance  5. Anxiety  6. Foot pain with tendonous injury- healed  7. Left arm weakness, consistent with radial nerve injury- improving  8. Port placement- h/o recurrent infections, getting TPN    Plan:   1. Physical therapy- not at this time with COVID  2. He will get UDS done at clinic.  3. Medications: agreed to increase to 30mg/day of the oxycodone.  4. Follow up in 3 months- will determine virtual vs in person based on how COVID numbers  are      Phone call duration: 16 minutes    Jessica Milligan MD  [facility]

## 2020-11-06 NOTE — PATIENT INSTRUCTIONS
1. I have changed your oxycodone script to allow 30mg/day of the medication.    ----------------------------------------------------------------  Clinic Number:  [phone]     Call with any questions about your care and for scheduling assistance.     Calls are returned Monday through Friday between 8 AM and 4:30 PM. We usually get back to you within 2 business days depending on the issue/request.    If we are prescribing your medications:    For opioid medication refills, call the clinic or send a [employer] message 7 days in advance.  Please include:    Name of requested medication    Name of the pharmacy.    For non-opioid medications, call your pharmacy directly to request a refill. Please allow 3-4 days to be processed.     Per [place] State Law:    All controlled substance prescriptions must be filled within 30 days of being written.      For those controlled substances allowing refills, pickup must occur within 30 days of last fill.      We believe regular attendance is key to your success in our program!      Any time you are unable to keep your appointment we ask that you call us at least 24 hours in advance to cancel.This will allow us to offer the appointment time to another patient.     Multiple missed appointments may lead to dismissal from the clinic.

## 2020-11-06 NOTE — PROGRESS NOTES
This is a recent snapshot of the patient's [facility] medical record.  For current drug dose and complete information and questions, call [phone] or In Basket pool, [facility] home infusion ([id number])  CSN Number:  [id number]

## 2020-11-06 NOTE — TELEPHONE ENCOUNTER
This was managed in a separate encounter.    YADIRA LazarN, RN  Care Coordinator  [facility] [place]

## 2020-11-06 NOTE — TELEPHONE ENCOUNTER
From: Parker Acevedo      Created: 11/6/2020 11:05 AM        His prescriptions were sent to the wrong pharmacy they need to go to [facility] some how they were sent to [facility] pharmacy I just wanted to let you know thank you so much Rose Acevedo        Scripts cancelled at the discharge pharmacy and pended for [facility] pharmacy.    Jyothi Winchester, YADIRAN, RN  Care Coordinator  [facility] [place]

## 2020-11-06 NOTE — TELEPHONE ENCOUNTER
Pts wife calling to state that fentaNYL (DURAGESIC) 25 mcg/hr 72 hr patch and oxyCODONE (ROXICODONE) 5 MG/5ML solution were sent to the wrong pharmacy. [place] [facility] [place] [place] [address].    Estelle RAMOS    [facility]

## 2020-11-09 ENCOUNTER — OFFICE VISIT (OUTPATIENT)
Dept: INTERNAL MEDICINE | Facility: CLINIC | Age: 50
End: 2020-11-09
Payer: COMMERCIAL

## 2020-11-09 ENCOUNTER — TELEPHONE (OUTPATIENT)
Dept: INTERNAL MEDICINE | Facility: CLINIC | Age: 50
End: 2020-11-09

## 2020-11-09 VITALS
SYSTOLIC BLOOD PRESSURE: 128 MMHG | RESPIRATION RATE: 16 BRPM | DIASTOLIC BLOOD PRESSURE: 78 MMHG | BODY MASS INDEX: 26.78 KG/M2 | OXYGEN SATURATION: 98 % | TEMPERATURE: 97.6 F | WEIGHT: 192 LBS | HEART RATE: 72 BPM

## 2020-11-09 DIAGNOSIS — Z93.4 GASTROJEJUNOSTOMY TUBE STATUS (H): ICD-10-CM

## 2020-11-09 DIAGNOSIS — Z11.59 ENCOUNTER FOR SCREENING FOR OTHER VIRAL DISEASES: ICD-10-CM

## 2020-11-09 DIAGNOSIS — Z23 NEED FOR PROPHYLACTIC VACCINATION AND INOCULATION AGAINST INFLUENZA: ICD-10-CM

## 2020-11-09 DIAGNOSIS — Z01.818 PREOP GENERAL PHYSICAL EXAM: Primary | ICD-10-CM

## 2020-11-09 DIAGNOSIS — Z98.84 GASTRIC BYPASS STATUS FOR OBESITY: ICD-10-CM

## 2020-11-09 PROCEDURE — 90686 IIV4 VACC NO PRSV 0.5 ML IM: CPT | Performed by: INTERNAL MEDICINE

## 2020-11-09 PROCEDURE — 99214 OFFICE O/P EST MOD 30 MIN: CPT | Mod: 25 | Performed by: INTERNAL MEDICINE

## 2020-11-09 PROCEDURE — U0003 INFECTIOUS AGENT DETECTION BY NUCLEIC ACID (DNA OR RNA); SEVERE ACUTE RESPIRATORY SYNDROME CORONAVIRUS 2 (SARS-COV-2) (CORONAVIRUS DISEASE [COVID-19]), AMPLIFIED PROBE TECHNIQUE, MAKING USE OF HIGH THROUGHPUT TECHNOLOGIES AS DESCRIBED BY CMS-2020-01-R: HCPCS | Performed by: INTERNAL MEDICINE

## 2020-11-09 PROCEDURE — G0008 ADMIN INFLUENZA VIRUS VAC: HCPCS | Performed by: INTERNAL MEDICINE

## 2020-11-09 ASSESSMENT — PAIN SCALES - GENERAL: PAINLEVEL: NO PAIN (0)

## 2020-11-09 NOTE — PROGRESS NOTES
Prior to immunization administration, verified patients identity using patient s name and date of birth. Please see Immunization Activity for additional information.     Screening Questionnaire for Adult Immunization    Are you sick today?   No   Do you have allergies to medications, food, a vaccine component or latex?   Yes   Have you ever had a serious reaction after receiving a vaccination?   No   Do you have a long-term health problem with heart, lung, kidney, or metabolic disease (e.g., diabetes), asthma, a blood disorder, no spleen, complement component deficiency, a cochlear implant, or a spinal fluid leak?  Are you on long-term aspirin therapy?   No   Do you have cancer, leukemia, HIV/AIDS, or any other immune system problem?   No   Do you have a parent, brother, or sister with an immune system problem?   No   In the past 3 months, have you taken medications that affect  your immune system, such as prednisone, other steroids, or anticancer drugs; drugs for the treatment of rheumatoid arthritis, Crohn s disease, or psoriasis; or have you had radiation treatments?   No   Have you had a seizure, or a brain or other nervous system problem?   No   During the past year, have you received a transfusion of blood or blood    products, or been given immune (gamma) globulin or antiviral drug?   No   For women: Are you pregnant or is there a chance you could become       pregnant during the next month?   No   Have you received any vaccinations in the past 4 weeks?   No     Immunization questionnaire was positive for at least one answer.  Notified Yes.        Per orders of Dr. Chuy Isaac, injection of Influenza given by Jael Mir CMA. Patient instructed to remain in clinic for 15 minutes afterwards, and to report any adverse reaction to me immediately.       Screening performed by Jael Mir CMA on 11/9/2020 at 2:30 PM.

## 2020-11-09 NOTE — TELEPHONE ENCOUNTER
Reason for Call:  Same Day Appointment, Requested Provider:  Chuy Isaac MD    PCP: Chuy Isaac    Reason for visit:  Pre op physical -  DOS 11/11 - [facility]    Duration of symptoms:     Have you been treated for this in the past?     Additional comments: Requesting to be worked in today or tomorrow for pre op    Can we leave a detailed message on this number? YES    Phone number patient can be reached at: Home number on file [phone] (home)    Best Time: any    Call taken on 11/9/2020 at 7:57 AM by Tanya Delacruz

## 2020-11-09 NOTE — PATIENT INSTRUCTIONS
"  Preparing for Your Surgery  Getting started  A surgery nurse will call you to review your health history and instructions. They will give you an arrival time based on your scheduled surgery time.  Please be ready to share the following:    Your doctor's clinic name and phone number    Your medical, surgical and anesthesia history    A list of allergies and sensitivities    A list of medicines, including herbal treatments and over-the-counter drugs    Whether the patient has a legal guardian (ask how to send us the papers in advance)  If your child is having surgery, please ask for a copy of Preparing for Your Child's Surgery.    Preparing for surgery    Within 30 days of surgery: Have an exam at your family clinic (primary care clinic), or go to a pre-operative clinic. This exam is called a \"History and Physical,\" or H&P.    At your H&P exam, talk to your care team about all medicines you take. If you need to stop any medicines before surgery, ask when to start taking them again.  ? We do this for your safety. Many medicines can make you bleed too much during surgery. Some change how well surgery (anesthesia) drugs work.    Call your insurance company to see what it will and won't pay for. Ask if they need to pre-approve the surgery. (If no insurance, call [phone].)    Call your surgeon's clinic if there's any change in your health. This includes signs of a cold or flu (sore throat, runny nose, cough, rash, fever). It also includes a scrape or scratch near the surgery site.    If you have questions on the day of surgery, call your surgery center.  Eating and drinking guidelines  For your safety: Unless your surgeon tells you otherwise, follow the guidelines below.    Eat and drink as usual until 8 hours before surgery. After that, no food or milk.    Drink clear liquids until 2 hours before surgery. These are liquids you can see through, like water, Gatorade and Propel Water. You may also have black coffee " and tea (no cream or milk).    Nothing by mouth within 2 hours of surgery. This includes gum, candy and breath mints.    Stop alcohol the midnight before surgery.    If your family clinic tells you to take medicine on the morning of surgery, it's okay to take it with a sip of water.  Preventing infection    Shower or bathe the night before and morning of your surgery. Follow the instructions your clinic gave you. (If no instructions, use regular soap.)    Don't shave or clip hair near your surgery site. This can lead to skin infection.    Don't smoke the morning of surgery. Smoking increases the risk of infection. You may chew nicotine gum up to 2 hours before surgery. A nicotine patch is okay.  ? Note: Some surgeries require you to completely quit smoking and nicotine. Check with your surgeon.    Your care team will make every effort to keep you safe from infection. We will:  ? Clean our hands often with soap and water (or an alcohol-based hand rub).  ? Clean the skin at your surgery site with a special soap that kills germs. We'll also remove hair from the site as needed.  ? Wear special hair covers, masks, gowns and gloves during surgery.  ? Give antibiotic medicine, if prescribed. Not all surgeries need antibiotics.  What to bring on the day of surgery    Photo ID and insurance card    Copy of your health care directive, if you have one    Glasses and hearing aides (bring cases)  ? You can't wear contacts during surgery    Inhaler and eye drops, if you use them (tell us about these when you arrive)    CPAP machine or breathing device, if you use them    A few personal items, if spending the night    If you have . . .  ? A pacemaker or ICD (cardiac defibrillator): Bring the ID card.  ? An implanted stimulator: Bring the remote control.  ? A legal guardian: Bring a copy of the certified (court-stamped) guardianship papers.  Please remove any jewelry, including body piercings. Leave jewelry and other valuables at  home.  If you're going home the day of surgery  Important: If you don't follow the rules below, we must cancel your surgery.     Arrange for someone to drive you home after surgery. You may not drive, take a taxi or take public transportation by yourself (unless you'll have local anesthesia only).    Arrange for a responsible adult to stay with you overnight. If you don't, we may keep you in the hospital overnight, and you may need to pay the costs yourself.  Questions?   If you have any questions for your care team, list them here: _________________________________________________________________________________________________________________________________________________________________________________________________________________________________________________________________________________________________________________________  For informational purposes only. Not to replace the advice of your health care provider. Copyright   9761-7048 [facility]. All rights reserved. Clinically reviewed by Ct Cota MD. [employer] 139427 - REV 07/19.

## 2020-11-09 NOTE — PROGRESS NOTES
[address] [address]  [phone]  Dept: [phone]    PRE-OP EVALUATION:  Today's date: 2020    Parker Acevedo (: 1970) presents for pre-operative evaluation assessment as requested by Dr. Jeong.  He requires evaluation and anesthesia risk assessment prior to undergoing surgery/procedure for treatment of gastric outlet obstruction  .    Fax number for surgical facility:   Primary Physician: Chuy Isaac  Type of Anesthesia Anticipated: General    Preop Questionnnaire:  Pre-op Questionnaire 2020   Surgery Location: -   Surgeon: -   Surgery/Procedure: -   Surgery Date: -   Time of Surgery: -   1. Have you ever had a heart attack or stroke? No   2. Have you ever had surgery on your heart or blood vessels, such as a stent placement, a coronary artery bypass, or surgery on an artery in your head, neck, heart, or legs? No   3. Do you have chest pain with activity? No   4. Do you have a history of  heart failure? No   5. Do you currently have a cold, bronchitis or symptoms of other infection? No   6. Do you have a cough, shortness of breath, or wheezing? No   7. Do you or anyone in your family have previous history of blood clots? No   8. Do you or does anyone in your family have a serious bleeding problem such as prolonged bleeding following surgeries or cuts? No   9. Have you ever had problems with anemia or been told to take iron pills? No   10. Have you had any abnormal blood loss such as black, tarry or bloody stools? No   11. Have you ever had a blood transfusion? No   12. Are you willing to have a blood transfusion if it is medically needed before, during, or after your surgery? Yes   13. Have you or any of your relatives ever had problems with anesthesia? No   14. Do you have sleep apnea, excessive snoring or daytime drowsiness? No   15. Do you have any artifical heart valves or other implanted medical devices like a  pacemaker, defibrillator, or continuous glucose monitor? No   16. Do you have artificial joints? No   17. Are you allergic to latex? No         HPI:     HPI related to upcoming procedure: gastric outlet obstruction and needs a drainage G tube replaced.        See problem list for active medical problems.  Problems all longstanding and stable, except as noted/documented.  See ROS for pertinent symptoms related to these conditions.      MEDICAL HISTORY:     Patient Active Problem List    Diagnosis Date Noted     Gastric outlet obstruction 10/07/2020     Priority: Medium     Added automatically from request for surgery [id number]       Gram-positive bacteremia 09/29/2019     Priority: Medium     On total parenteral nutrition 09/29/2019     Priority: Medium     Added automatically from request for surgery [id number]       PICC line infiltration, sequela 07/22/2019     Priority: Medium     Infection by Candida species 01/04/2019     Priority: Medium     Bacteremia 10/10/2018     Priority: Medium     Hyperlipidemia LDL goal <130 08/07/2018     Priority: Medium     S/P bariatric surgery 07/30/2018     Priority: Medium     Generalized weakness 01/30/2018     Priority: Medium     Catheter-related bloodstream infection (CRBSI) 09/23/2017     Priority: Medium     Low serum iron 09/08/2017     Priority: Medium     Anemia, iron deficiency 09/08/2017     Priority: Medium     Abnormal echocardiogram 04/11/2017     Priority: Medium     Port or reservoir infection, initial encounter 03/16/2017     Priority: Medium     Status post cervical spinal arthrodesis 02/15/2017     Priority: Medium     Cardiomyopathy in nutritional diseases (H)      Priority: Medium     mild EF ~45% on rest 2/13/17, improves with stressing       Short gut syndrome      Priority: Medium     Anxiety      Priority: Medium     Gastrostomy tube in place (H) 08/09/2016     Priority: Medium     Chronic nausea 05/10/2016     Priority: Medium     Fungemia 04/11/2016      Priority: Medium     Positive blood culture 03/29/2016     Priority: Medium     Insomnia 08/13/2015     Priority: Medium     Chronic pain 07/07/2015     Priority: Medium     Patient is followed by Dr. Milligan for ongoing prescription of pain medication.  All refills should be approved by this provider, or covering partner.    Medication(s): Fentanyl 50 mcg patches every three days/ Liquid oxycodone 5 mg/5ml up to 50 mg daily.   Maximum quantity per month: as per Dr. Milligan [facility]  Clinic visit frequency required: Q 3 months at [facility]    Controlled substance agreement on file: Yes       Date(s): Through [facility]    Pain Clinic evaluation in the past: Yes       Date(s):  Ongoing every three months       Location(s):  Per pain management    DIRE Total Score(s):  No flowsheet data found.    Last [facility] website verification:  Through [facility]   [email or web address]    Esteban Daly MD             [facility] Care Home 02/24/2015     Priority: Medium              Iron deficiency 05/23/2014     Priority: Medium     Vitamin D deficiency 05/22/2014     Priority: Medium     Former smoker 02/24/2014     Priority: Medium     Bile reflux esophagitis 10/16/2013     Priority: Medium     Constipation 10/01/2013     Priority: Medium     Chronic anxiety 09/25/2013     Priority: Medium     Dysphagia 09/17/2013     Priority: Medium     Weight loss, non-intentional 09/17/2013     Priority: Medium     Malnutrition (H) 09/17/2013     Priority: Medium     Dehydration 08/28/2013     Priority: Medium     Vitamin B12 deficiency without anemia 07/31/2013     Priority: Medium     Diagnosis updated by automated process. Provider to review and confirm.       Thiamine deficiency 07/31/2013     Priority: Medium     ADHD (attention deficit hyperactivity disorder), inattentive type 07/02/2013     Priority: Medium     Patient is followed by [facility]ICCO SHARP for ongoing prescription of stimulants.  All  refills should be approved by this provider, or covering partner.    Medication(s): Adderall.   Maximum quantity per month: 30  Clinic visit frequency required:      Controlled substance agreement on file: No  Neuropsych evaluation for ADD completed:  No    Last [facility] website verification:  done on 5/6/19  [email or web address]         Anemia 09/20/2012     Priority: Medium     Vomiting 06/28/2012     Priority: Medium     Chronic abdominal pain 06/01/2012     Priority: Medium     Patient is followed by ALEXANDRIA WHITLEY for ongoing prescription of narcotic pain medicine.  Med: liquid oxycodone up to 60 mg per day.   Maximum use per month:   Expected duration: ongoing, hope per surgery that will resolve with upcoming surgery  Narcotic agreement on file: YES  Clinic visit recommended: Q 3 months         Peptic ulcer disease 04/08/2010     Priority: Medium     Gastric bypass status for obesity 04/08/2010     Priority: Medium     Top weight of 492.        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     Complication of anesthesia      Difficulty swallowing      Gastric ulcer, unspecified as acute or chronic, without mention of hemorrhage, perforation, or obstruction      Gastro-oesophageal reflux disease      Head injury      Hiatal hernia      Other bladder disorder      Other chronic pain      PONV (postoperative nausea and vomiting)      Severe malnutrition (H)     TPN     Short gut syndrome      Tobacco abuse      Past Surgical History:   Procedure Laterality Date     AMPUTATION       APPENDECTOMY       BACK SURGERY  11/3/2014    curve in the spine     BIOPSY LYMPH NODE CERVICAL N/A 2/20/2015    Procedure: BIOPSY LYMPH NODE CERVICAL;   Surgeon: Baron Scanlon MD;  Location: [facility]     C GASTRIC BYPASS,OBESE<100CM SHAYLEE-EN-Y  2002    lost 300 pounds     CHOLECYSTECTOMY       DISCECTOMY, FUSION CERVICAL ANTERIOR ONE LEVEL, COMBINED N/A 2/15/2017    Procedure: COMBINED DISCECTOMY, FUSION CERVICAL ANTERIOR ONE LEVEL;  Surgeon: Darren Campos MD;  [facility]     ENDOSCOPIC INSERTION TUBE GASTROSTOMY  9/9/2013    Procedure: ENDOSCOPIC INSERTION TUBE GASTROSTOMY;;  Surgeon: Francis Vyas MD;  [facility]     ENDOSCOPIC ULTRASOUND UPPER GASTROINTESTINAL TRACT (GI)  4/29/2011    Procedure:ENDOSCOPIC ULTRASOUND UPPER GASTROINTESTINAL TRACT (GI); Both Procedures done Conjointly; Surgeon:NEREIDA HOUSER; [facility]     ENDOSCOPIC ULTRASOUND UPPER GASTROINTESTINAL TRACT (GI)  9/9/2013    Procedure: ENDOSCOPIC ULTRASOUND UPPER GASTROINTESTINAL TRACT (GI);  Endoscopic Ultrasound Guide Gastrostomy Tube Placement  C-arm;  Surgeon: Noe Lizarraga MD;  [facility]     ENDOSCOPY  03/25/11    EGD, [facility]     ENDOSCOPY  08/04/09    Upper Endoscopy, [facility]     ENDOSCOPY  01/05/09    Upper Endoscopy, [facility]     ESOPHAGOSCOPY, GASTROSCOPY, DUODENOSCOPY (EGD), COMBINED  4/20/2011    Procedure:COMBINED ESOPHAGOSCOPY, GASTROSCOPY, DUODENOSCOPY (EGD); Surgeon:FRANCIS VYAS; [facility]     ESOPHAGOSCOPY, GASTROSCOPY, DUODENOSCOPY (EGD), COMBINED  6/15/2011    Procedure:COMBINED ESOPHAGOSCOPY, GASTROSCOPY, DUODENOSCOPY (EGD); Surgeon:FRANCIS VYAS; [facility]     ESOPHAGOSCOPY, GASTROSCOPY, DUODENOSCOPY (EGD), COMBINED  6/12/2013    Procedure: COMBINED ESOPHAGOSCOPY, GASTROSCOPY, DUODENOSCOPY (EGD);;  Surgeon: Francis Vyas MD;  [facility]     ESOPHAGOSCOPY, GASTROSCOPY, DUODENOSCOPY (EGD), COMBINED  11/22/2013    Procedure: COMBINED ESOPHAGOSCOPY, GASTROSCOPY, DUODENOSCOPY (EGD);;  Surgeon: Francis Vyas MD;  [facility]     ESOPHAGOSCOPY, GASTROSCOPY,  DUODENOSCOPY (EGD), COMBINED  4/30/2014    Procedure: COMBINED ESOPHAGOSCOPY, GASTROSCOPY, DUODENOSCOPY (EGD);  Surgeon: Francis Vyas MD;  Location:  GI     ESOPHAGOSCOPY, GASTROSCOPY, DUODENOSCOPY (EGD), COMBINED N/A 2/20/2015    Procedure: COMBINED ESOPHAGOSCOPY, GASTROSCOPY, DUODENOSCOPY (EGD), BIOPSY SINGLE OR MULTIPLE;  Surgeon: Baron Scanlon MD;  [facility]     ESOPHAGOSCOPY, GASTROSCOPY, DUODENOSCOPY (EGD), COMBINED N/A 9/30/2015    Procedure: COMBINED ESOPHAGOSCOPY, GASTROSCOPY, DUODENOSCOPY (EGD);  Surgeon: Francis Vyas MD;  Location:  GI     ESOPHAGOSCOPY, GASTROSCOPY, DUODENOSCOPY (EGD), COMBINED N/A 10/3/2019    Procedure: Upper Endoscopy;  Surgeon: Clif Morrow MD;  [facility]     GASTRECTOMY  6/22/2012    Procedure: GASTRECTOMY;  Open Approach, Excise Ulcers,Partial Gastrectomy, Esophagojejunostomy, Hiatal Hernia Repair, Extensive Lysis of Adhesions and Esaphagogastrodudenoscopy.;  Surgeon: Francis Vyas MD;  [facility]     GASTROJEJUNOSTOMY  08/26/09    Extensice enterolysis, partial resect. jejunum, part. resect gastric pouch, gastrojejunostomy anastomosis     HC ESOPH/GAS REFLUX TEST W NASAL IMPED ELECTRODE  8/5/2013    Procedure: ESOPHAGEAL IMPEDENCE FUNCTION TEST 1 HOUR OR LESS;  Surgeon: Halie Lang MD;  Location:  GI     HEAD & NECK SURGERY  2/15/2017    C5-C6     HERNIA REPAIR  2006    Umbilical hernia     HERNIORRHAPHY HIATAL  6/22/2012    Procedure: HERNIORRHAPHY HIATAL;;  Surgeon: Francis Vyas MD;  [facility]     HERNIORRHAPHY INGUINAL  11/22/2013    Procedure: HERNIORRHAPHY INGUINAL;;  Surgeon: Francis Vyas MD;  [facility]     INSERT PICC LINE Right 12/19/2019    Procedure: Picc Placement;  Surgeon: Per Dumont PA-C;  [facility]     INSERT PICC LINE Right 2/21/2020    Procedure: INSERTION, PICC;  Surgeon: Per Dumont PA-C;  [facility]     INSERT PORT VASCULAR ACCESS  Right 12/19/2017    Procedure: INSERT PORT VASCULAR ACCESS;  Right Chest Port Placement ;  Surgeon: Lisandro Alejandro PA-C;  [facility]     INSERT PORT VASCULAR ACCESS Right 8/2/2018    Procedure: INSERT PORT VASCULAR ACCESS;  Place single lumen tunneled central venous access catheter;  Surgeon: Guy Jamil PA-C;  [facility]     IR CVC TUNNEL PLACEMENT > 5 YRS OF AGE  8/7/2019     IR CVC TUNNEL PLACEMENT > 5 YRS OF AGE  4/14/2020     IR CVC TUNNEL PLACEMENT > 5 YRS OF AGE  8/3/2020     IR CVC TUNNEL PLACEMENT > 5 YRS OF AGE  9/4/2020     IR CVC TUNNEL REMOVAL RIGHT  10/1/2019     IR CVC TUNNEL REMOVAL RIGHT  7/30/2020     IR CVC TUNNEL REMOVAL RIGHT  9/2/2020     IR FOLLOW UP VISIT OUTPATIENT  8/7/2019     IR PICC PLACEMENT > 5 YRS OF AGE  3/7/2019     IR PICC PLACEMENT > 5 YRS OF AGE  12/19/2019     IR PICC PLACEMENT > 5 YRS OF AGE  2/21/2020     LAPAROTOMY EXPLORATORY  11/22/2013    Procedure: LAPAROTOMY EXPLORATORY;  Exploratory Laparotomy, Upper Endoscopy, Left Inguinal Hernia Repair;  Surgeon: Francis Vyas MD;  [facility]     ORTHOPEDIC SURGERY       PICC INSERTION Right 03/16/2017    5fr DL BioFlo PICC, 42cm (3cm external) in the R medial brachial vein w/ tip in the SVC RA junction.     PICC INSERTION Left 09/23/2017    5fr DL BioFlo PICC, 45cm (1cm external) in the L basilic vein w/ tip in the SVC RA junction.     PICC INSERTION Right 05/16/2019    5Fr - 43cm, Medial brachial vein, low SVC     PICC INSERTION Right 10/02/2019    5Fr - 43cm (2cm external), basilic vein, low SVC     SHAYLEE EN Y BOWEL  2003     SOFT TISSUE SURGERY       THORACIC SURGERY       TONSILLECTOMY       TRANSESOPHAGEAL ECHOCARDIOGRAM INTRAOPERATIVE N/A 1/8/2019    Procedure: TRANSESOPHAGEAL ECHOCARDIOGRAM INTRAOPERATIVE;  Surgeon: GENERIC ANESTHESIA PROVIDER;  [facility]     Current Outpatient Medications   Medication Sig Dispense Refill     acetaminophen (TYLENOL) 32 mg/mL liquid Take 15.65  "mLs (500 mg) by mouth every 4 hours as needed for fever or mild pain 455 mL 1     albuterol (PROAIR HFA/PROVENTIL HFA/VENTOLIN HFA) 108 (90 Base) MCG/ACT inhaler Inhale 2 puffs into the lungs every 4 hours as needed for shortness of breath / dyspnea or wheezing 1 Inhaler 1     alteplase 2 mg/2 mL (in 10 mL syringe) Inject 1 mg into the vein as needed       amphetamine-dextroamphetamine (ADDERALL) 20 MG tablet TAKE ONE TABLET BY MOUTH ONCE DAILY 30 tablet 0     carvedilol (COREG) 6.25 MG tablet Take 6.25 mg by mouth 2 times daily (with meals)       cyanocobalamin (CYANOCOBALAMIN) 1000 MCG/ML injection Inject 1 mL (1,000 mcg) into the muscle every 30 days 1 mL 11     diphenhydrAMINE (BENADRYL) 25 MG capsule Take 1 capsule (25 mg) by mouth every 8 hours 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  Phones are answered 24 hours a day 7 days a week 365 days a year.    Providers - Choose \"CONTINUE HOME MED (no script)\" at discharge if patient treatment with home infusion will continue.       fentaNYL (DURAGESIC) 25 mcg/hr 72 hr patch Place 1 patch onto the skin every 48 hours remove old patch.   May fill 11/7/20 and start 11/9/20 15 patch 0     Lidocaine (LIDOCARE) 4 % Patch Place 1 patch onto the skin every 24 hours To prevent lidocaine toxicity, patient should be patch free for 12 hrs daily. 30 patch 0     lidocaine, alum & mag hydroxide-simethicone GI Cocktail 30 ml daily as needed for mouth/stomach pain. 1 Bottle 1     LORazepam (ATIVAN) 1 MG tablet TAKE 1 TABLET (1MG) BY MOUTH AS NEEDED FOR ANXIETY WITH TPN AND MEDICATIONS . JUST ONCE A DAY (30 TO LAST 30 DAYS) 30 tablet 0     nystatin (MYCOSTATIN) 981664 UNIT/GM external cream Apply topically 2 times daily 30 g 0     ondansetron (ZOFRAN-ODT) 8 MG ODT tab " DISSOLVE ONE TABLET ON TONGUE EVERY 8 HOURS AS NEEDED FOR NAUSEA 90 tablet 1     oxyCODONE (ROXICODONE) 5 MG/5ML solution Take 5-10 mLs (5-10 mg) by mouth 3 times daily as needed for pain Max of 30mg/day. Fill 11/9/20 to start on/after 11/11/20. 30 day supply. 900 mL 0     pantoprazole (PROTONIX) 40 MG EC tablet Take 1 tablet (40 mg) by mouth daily 30 tablet 5     parenteral nutrition - PTA/DISCHARGE ORDER Patient receives 2050 mL TPN every 14 hours through PICC at [facility].       polyethylene glycol (MIRALAX) 17 g packet Take 17 g by mouth daily 72 packet 6     sucralfate (CARAFATE) 1 GM/10ML suspension Take 1 g by mouth 4 times daily as needed       vitamin D2 (ERGOCALCIFEROL) 03733 units (1250 mcg) capsule Take 1 capsule (50,000 Units) by mouth once a week 12 capsule 3     naloxone (NARCAN) 4 MG/0.1ML nasal spray Spray 1 spray (4 mg) into one nostril alternating nostrils as needed for opioid reversal every 2-3 minutes until assistance arrives (Patient not taking: Reported on 11/9/2020) 0.2 mL 0     nicotine (COMMIT) 2 MG lozenge Place 1 lozenge (2 mg) inside cheek every hour as needed for smoking cessation 30 lozenge 0     nicotine (NICODERM CQ) 21 MG/24HR 24 hr patch Place 1 patch onto the skin every 24 hours 30 patch 0     OTC products: None, except as noted above    Allergies   Allergen Reactions     Bactrim [Sulfamethoxazole W/Trimethoprim] Rash     Penicillins Anaphylaxis     Please see Antimicrobial Management Team allergy assessment note 10/10/2018. Patient reported tolerating amoxicillin.     Doxycycline Rash     Vancomycin Rash     Rash after receiving vancomycin 3/28/16 (red man's?). Tolerated with slower infusion and diphenhydramine premed.      Latex Allergy: NO    Social History     Tobacco Use     Smoking status: Current Some Day Smoker     Packs/day: 1.00     Years: 6.00     Pack years: 6.00     Types: Cigarettes     Smokeless tobacco: Never Used   Substance Use Topics     Alcohol  use: No     Frequency: Never     Drinks per session: Patient refused     Binge frequency: Never     Comment: quit 2002     History   Drug Use No     REVIEW OF SYSTEMS:   Constitutional, neuro, ENT, endocrine, pulmonary, cardiac, gastrointestinal, genitourinary, musculoskeletal, integument and psychiatric systems are negative, except as otherwise noted.  Chronic GI issues and reason for surgery.    EXAM:   /78   Pulse 72   Temp 97.6  F (36.4  C) (Temporal)   Resp 16   Wt 87.1 kg (192 lb)   SpO2 98%   BMI 26.78 kg/m      GENERAL APPEARANCE: healthy, alert and no distress     NECK: no adenopathy, no asymmetry, masses, or scars and thyroid normal to palpation     RESP: lungs clear to auscultation - no rales, rhonchi or wheezes     CV: regular rates and rhythm, normal S1 S2, no S3 or S4 and no murmur, click or rub     MS: extremities normal- no gross deformities noted, no evidence of inflammation in joints, FROM in all extremities.     SKIN: no suspicious lesions or rashes     NEURO: Normal strength and tone, sensory exam grossly normal, mentation intact and speech normal    DIAGNOSTICS:   Labs are ok from October, repeat labs on Nov 10th.     Recent Labs   Lab Test 10/27/20  1220 10/12/20  1240 09/04/20  0459 09/04/20  0459 08/03/20  0600 08/03/20  0600 07/23/13  1036 07/23/13  1036 04/09/13  0921 04/09/13  0921   HGB 11.7* 10.4*   < > 11.5*   < >  --    < > 10.5*   < > 10.6*    159   < > 182   < >  --    < >  --    < >  --    INR  --   --   --  1.03  --  1.12   < >  --   --   --     143   < > 138   < > 143   < > 143   < >  --    POTASSIUM 3.5 3.6   < > 3.6   < > 3.4   < > 4.0   < >  --    CR 0.66 0.97   < > 0.83   < > 0.91   < > 0.72   < >  --    A1C  --   --   --   --   --   --   --  4.9  --  5.3    < > = values in this interval not displayed.     IMPRESSION:   Reason for surgery/procedure: Gastric outlet obstruction.     The proposed surgical procedure is considered INTERMEDIATE  risk.    REVISED CARDIAC RISK INDEX  The patient has the following serious cardiovascular risks for perioperative complications such as (MI, PE, VFib and 3  AV Block):  No serious cardiac risks  INTERPRETATION: 1 risks: Class II (low risk - 0.9% complication rate)    The patient has the following additional risks for perioperative complications:  No identified additional risks  Tobacco use     No diagnosis found.    RECOMMENDATIONS:         He will only take his Coreg     APPROVAL GIVEN to proceed with proposed procedure, without further diagnostic evaluation       Signed Electronically by: Chuy Isaac MD    Copy of this evaluation report is provided to requesting physician.    [facility] Preop Guidelines    Revised Cardiac Risk Index

## 2020-11-10 ENCOUNTER — TELEPHONE (OUTPATIENT)
Dept: INTERNAL MEDICINE | Facility: CLINIC | Age: 50
End: 2020-11-10

## 2020-11-10 ENCOUNTER — TELEPHONE (OUTPATIENT)
Dept: NURSING | Facility: CLINIC | Age: 50
End: 2020-11-10

## 2020-11-10 ENCOUNTER — TELEPHONE (OUTPATIENT)
Dept: SURGERY | Facility: CLINIC | Age: 50
End: 2020-11-10

## 2020-11-10 ENCOUNTER — TELEPHONE (OUTPATIENT)
Dept: PALLIATIVE MEDICINE | Facility: CLINIC | Age: 50
End: 2020-11-10

## 2020-11-10 LAB
SARS-COV-2 RNA SPEC QL NAA+PROBE: ABNORMAL
SPECIMEN SOURCE: ABNORMAL

## 2020-11-10 NOTE — TELEPHONE ENCOUNTER
"Coronavirus (COVID-19) Notification    Caller Name (Patient, parent, daughter/son, grandparent, etc)  Parker Acevedo    Reason for call  Notify of Positive Coronavirus (COVID-19) lab results, assess symptoms,  review  [employer] [place] recommendations    Lab Result    Lab test:  2019-nCoV rRt-PCR or SARS-CoV-2 PCR    Oropharyngeal AND/OR nasopharyngeal swabs is POSITIVE for 2019-nCoV RNA/SARS-COV-2 PCR (COVID-19 virus)    RN Recommendations/Instructions per [facility] Coronavirus COVID-19 recommendations    Brief introduction script  Introduce self then review script:  \"I am calling on behalf of [employer].  We were notified that your Coronavirus test (COVID-19) for was POSITIVE for the virus.  I have some information to relay to you but first I wanted to mention that the [place] Dept of Health will be contacting you shortly [it's possible MD already called Patient] to talk to you more about how you are feeling and other people you have had contact with who might now also have the virus.  Also, [facility] is Partnering with the [facility] for Covid-19 research, you may be contacted directly by research staff.\"    Assessment (Inquire about Patient's current symptoms)   Assessment   Current Symptoms at time of phone call: (if no symptoms, document No symptoms] SOB, nausea   Symptoms onset (if applicable) Uncertain 11/3/2020     If at time of call, Patients symptoms hare worsened, the Patient should contact 911 or have someone drive them to Emergency Dept promptly:      If Patient calling 911, inform 911 personal that you have tested positive for the Coronavirus (COVID-19).  Place mask on and await 911 to arrive.    If Emergency Dept, If possible, please have another adult drive you to the Emergency Dept but you need to wear mask when in contact with other people.      Review information with Patient    Your result was positive. This means you have COVID-19 (coronavirus).  We have sent you a " letter that reviews the information that I'll be reviewing with you now.    How can I protect others?    If you have symptoms: stay home and away from others (self-isolate) until:    You've had no fever--and no medicine that reduces fever--for 1 full day (24 hours). And       Your other symptoms have gotten better. For example, your cough or breathing has improved. And     At least 10 days have passed since your symptoms started. (If you've been told by a doctor that you have a weak immune system, wait 20 days.)     If you don't have symptoms: Stay home and away from others (self-isolate) until at least 10 days have passed since your first positive COVID-19 test. (Date test collected)    During this time:    Stay in your own room, including for meals. Use your own bathroom if you can.    Stay away from others in your home. No hugging, kissing or shaking hands. No visitors.     Don't go to work, school or anywhere else.     Clean  high touch  surfaces often (doorknobs, counters, handles, etc.). Use a household cleaning spray or wipes. You'll find a full list on the EPA website at www.epa.gov/pesticide-registration/list-n-disinfectants-use-against-sars-cov-2.     Cover your mouth and nose with a mask, tissue or other face covering to avoid spreading germs.    Wash your hands and face often with soap and water.    Caregivers in these groups are at risk for severe illness due to COVID-19:  o People 65 years and older  o People who live in a nursing home or long-term care facility  o People with chronic disease (lung, heart, cancer, diabetes, kidney, liver, immunologic)  o People who have a weakened immune system, including those who:  - Are in cancer treatment  - Take medicine that weakens the immune system, such as corticosteroids  - Had a bone marrow or organ transplant  - Have an immune deficiency  - Have poorly controlled HIV or AIDS  - Are obese (body mass index of 40 or higher)  - Smoke regularly    Caregivers  should wear gloves while washing dishes, handling laundry and cleaning bedrooms and bathrooms.    Wash and dry laundry with special caution. Don't shake dirty laundry, and use the warmest water setting you can.    If you have a weakened immune system, ask your doctor about other actions you should take.    For more tips, go to www.cdc.gov/coronavirus/2019-ncov/downloads/10Things.pdf.    You should not go back to work until you meet the guidelines above for ending your home isolation. You don't need to be retested for COVID-19 before going back to work--studies show that you won't spread the virus if it's been at least 10 days since your symptoms started (or 20 days, if you have a weak immune system).    Employers: This document serves as formal notice of your employee's medical guidelines for going back to work. They must meet the above guidelines before going back to work in person.    How can I take care of myself?    1. Get lots of rest. Drink extra fluids (unless a doctor has told you not to).    2. Take Tylenol (acetaminophen) for fever or pain. If you have liver or kidney problems, ask your family doctor if it's okay to take Tylenol.     Take either:     650 mg (two 325 mg pills) every 4 to 6 hours, or     1,000 mg (two 500 mg pills) every 8 hours as needed.     Note: Don't take more than 3,000 mg in one day. Acetaminophen is found in many medicines (both prescribed and over-the-counter medicines). Read all labels to be sure you don't take too much.    For children, check the Tylenol bottle for the right dose (based on their age or weight).    3. If you have other health problems (like cancer, heart failure, an organ transplant or severe kidney disease): Call your specialty clinic if you don't feel better in the next 2 days.    4. Know when to call 911: Emergency warning signs include:    Trouble breathing or shortness of breath    Pain or pressure in the chest that doesn't go away    Feeling confused like you  haven't felt before, or not being able to wake up    Bluish-colored lips or face    5. Sign up for [employer]. We know it's scary to hear that you have COVID-19. We want to track your symptoms to make sure you're okay over the next 2 weeks. Please look for an email from [employer]--this is a free, online program that we'll use to keep in touch. To sign up, follow the link in the email. Learn more at [email or web address].    Where can I get more information?    [facility]: [email or web address]    Coronavirus Basics: www.health.[facility].[place]./diseases/coronavirus/basics.html    What to Do If You're Sick: www.cdc.gov/coronavirus/2019-ncov/about/steps-when-sick.html    Ending Home Isolation: www.cdc.gov/coronavirus/2019-ncov/hcp/disposition-in-home-patients.html     Caring for Someone with COVID-19: www.cdc.gov/coronavirus/2019-ncov/if-you-are-sick/care-for-someone.html     [facility] clinical trials (COVID-19 research studies): clinicalaffairs.[facility]-clinical-trials     A Positive COVID-19 letter will be sent via [employer] or the mail. (Exception, no letters sent to Presurgerical/Preprocedure Patients)    [Name]  Sarah Servin RN/[place] Nurse Advisor

## 2020-11-10 NOTE — TELEPHONE ENCOUNTER
Reason for Call:  Other call back    Detailed comments: Patient calling to let  know that he tested positve for covid and had a pre op with him yesterday. Please advise     Phone Number Patient can be reached at: Home number on file [phone] (home)    Best Time: anytime     Can we leave a detailed message on this number? YES    Call taken on 11/10/2020 at 3:25 PM by Miranda Seipel Vaccari

## 2020-11-10 NOTE — TELEPHONE ENCOUNTER
Thank him for informing us of the positive Covid test.  We were masked and not with him for more than 15 minutes so risk to health care workers should be low.    His procedure will need to be postponed.  He will have to quarantine for 10 days and have no symptoms for the last 3 days.  Anybody that is been around him for significant amount of time such as family members should also be quarantined for 14 days since his positive test.  He should inform all other contacts.

## 2020-11-11 ENCOUNTER — TELEPHONE (OUTPATIENT)
Dept: SURGERY | Facility: CLINIC | Age: 50
End: 2020-11-11

## 2020-11-11 ENCOUNTER — PATIENT OUTREACH (OUTPATIENT)
Dept: NURSING | Facility: CLINIC | Age: 50
End: 2020-11-11
Payer: COMMERCIAL

## 2020-11-11 DIAGNOSIS — U07.1 CLINICAL DIAGNOSIS OF COVID-19: Primary | ICD-10-CM

## 2020-11-11 ASSESSMENT — ACTIVITIES OF DAILY LIVING (ADL): DEPENDENT_IADLS:: MEDICATION MANAGEMENT;TRANSPORTATION;SHOPPING

## 2020-11-11 NOTE — PROGRESS NOTES
Clinic Care Coordination Contact    Follow Up Progress Note      Assessment: RN CLARITZA spoke with patient and spouse Rose (consent to communicate on file).  Patient tested positive for COVID-19 on 11/9/20.  Patient developed a cough, fever and shortness of breath approximately 1 week ago.  Fever today is 100.2.  Patient has an albuterol inhaler.  Patient is able to do moderate activities prior to developing shortness of breath.  Patient denies nausea, vomiting or diarrhea.    Goals addressed this encounter:   Goals Addressed                 This Visit's Progress      #1 Medical (pt-stated)        Goal Statement: I want to recover from COVID-19 virus.  Date Goal set: 11/11/2020   Barriers: multiple complex diagnoses  Strengths: spouse support  Date to Achieve By: 1/1/21  Patient expressed understanding of goal: yes  Action steps to achieve this goal:  1. I will use my albuterol every 4 hours as needed.  2. I will perform deep breathing and coughing exercises.  3. I will monitor my temperature and take Tylenol as needed/directed.  4. I will seek care for emergency symptoms.         #2 Medical (pt-stated)        Goal Statement: I want to prevent hospital visits  Date Goal set: 9/9/2020   Barriers: on chronic TPN, IV line  Strengths: home infusion, care coordination  Date to Achieve By: 12/31/20  Patient expressed understanding of goal: yes  Action steps to achieve this goal:  1. I will complete IV antibiotics as prescribed. (completed)  2. I will follow up with infectious disease provider as scheduled 9/24/20. (completed)  3. I will stay at home during COVID-19 pandemic.   4. I will monitor my temperature daily as instructed and contact [facility] for elevated temperature parameters.  5. I will have G-tube replaced as scheduled. (surgery scheduled 11/11/20)        #3 Medical (pt-stated)        Goal Statement: I will schedule and attend an appointment with cardiology.  Date Goal set: 10/7/19  Barriers:  COVID-19 pandemic, multiple specialists  Strengths: support from spouse  Date to Achieve By: 1/1/21  Patient expressed understanding of goal: yes  Action steps to achieve this goal:  1. I will schedule an appointment with cardiology  2. I will attend appointment with cardiology                  Intervention/Education provided during outreach: RN CC educated patient and spouse on using albuterol inhaler every 4 hours during the day as directed, importance of deep breathing and coughing exercises, taking Tylenol as needed/directed and when to seek emergency care.     Outreach Frequency: monthly    Plan:   1. Patient will take albuterol and Tylenol as needed/directed.  2. Patient will monitor temperature.  3. Patient will perform deep breathing/coughing exercises.  4. Patient will seek emergency care for severe shortness of breath or other new/woresning symptoms.  5. RN Care Coordinator will follow up in 1 week.    Melissa Behl BSN, RN, PHN, CCM  Primary Care Clinical RN Care Coordinator  [facility]   [phone]

## 2020-11-11 NOTE — TELEPHONE ENCOUNTER
Per Dr. Vyas's e-mail of 11/11, case for today with Dr. Bach has been canceled due to patient being COVID-19 positive. OR control desk notified.

## 2020-11-16 ENCOUNTER — MYC MEDICAL ADVICE (OUTPATIENT)
Dept: INTERNAL MEDICINE | Facility: CLINIC | Age: 50
End: 2020-11-16

## 2020-11-17 ENCOUNTER — PATIENT OUTREACH (OUTPATIENT)
Dept: NURSING | Facility: CLINIC | Age: 50
End: 2020-11-17
Payer: COMMERCIAL

## 2020-11-17 LAB
ALBUMIN SERPL-MCNC: 3.1 G/DL (ref 3.4–5)
ALP SERPL-CCNC: 71 U/L (ref 40–150)
ALT SERPL W P-5'-P-CCNC: 21 U/L (ref 0–70)
ANION GAP SERPL CALCULATED.3IONS-SCNC: 3 MMOL/L (ref 3–14)
AST SERPL W P-5'-P-CCNC: 16 U/L (ref 0–45)
BASOPHILS # BLD AUTO: 0 10E9/L (ref 0–0.2)
BASOPHILS NFR BLD AUTO: 0.7 %
BILIRUB DIRECT SERPL-MCNC: <0.1 MG/DL (ref 0–0.2)
BILIRUB SERPL-MCNC: 0.3 MG/DL (ref 0.2–1.3)
BUN SERPL-MCNC: 16 MG/DL (ref 7–30)
CALCIUM SERPL-MCNC: 8.4 MG/DL (ref 8.5–10.1)
CHLORIDE SERPL-SCNC: 111 MMOL/L (ref 94–109)
CO2 SERPL-SCNC: 29 MMOL/L (ref 20–32)
CREAT SERPL-MCNC: 0.78 MG/DL (ref 0.66–1.25)
CRP SERPL-MCNC: <2.9 MG/L (ref 0–8)
DIFFERENTIAL METHOD BLD: ABNORMAL
EOSINOPHIL # BLD AUTO: 0.2 10E9/L (ref 0–0.7)
EOSINOPHIL NFR BLD AUTO: 4.7 %
ERYTHROCYTE [DISTWIDTH] IN BLOOD BY AUTOMATED COUNT: 16.6 % (ref 10–15)
FERRITIN SERPL-MCNC: 6 NG/ML (ref 26–388)
GFR SERPL CREATININE-BSD FRML MDRD: >90 ML/MIN/{1.73_M2}
GLUCOSE SERPL-MCNC: 76 MG/DL (ref 70–99)
HCT VFR BLD AUTO: 31.7 % (ref 40–53)
HGB BLD-MCNC: 9.5 G/DL (ref 13.3–17.7)
IMM GRANULOCYTES # BLD: 0 10E9/L (ref 0–0.4)
IMM GRANULOCYTES NFR BLD: 0 %
LYMPHOCYTES # BLD AUTO: 1.5 10E9/L (ref 0.8–5.3)
LYMPHOCYTES NFR BLD AUTO: 32.6 %
MAGNESIUM SERPL-MCNC: 1.9 MG/DL (ref 1.6–2.3)
MCH RBC QN AUTO: 27.7 PG (ref 26.5–33)
MCHC RBC AUTO-ENTMCNC: 30 G/DL (ref 31.5–36.5)
MCV RBC AUTO: 92 FL (ref 78–100)
MONOCYTES # BLD AUTO: 0.6 10E9/L (ref 0–1.3)
MONOCYTES NFR BLD AUTO: 13.3 %
NEUTROPHILS # BLD AUTO: 2.2 10E9/L (ref 1.6–8.3)
NEUTROPHILS NFR BLD AUTO: 48.7 %
NRBC # BLD AUTO: 0 10*3/UL
NRBC BLD AUTO-RTO: 0 /100
PHOSPHATE SERPL-MCNC: 3.6 MG/DL (ref 2.5–4.5)
PLATELET # BLD AUTO: 129 10E9/L (ref 150–450)
POTASSIUM SERPL-SCNC: 3.9 MMOL/L (ref 3.4–5.3)
PROT SERPL-MCNC: 6.3 G/DL (ref 6.8–8.8)
RBC # BLD AUTO: 3.43 10E12/L (ref 4.4–5.9)
SODIUM SERPL-SCNC: 143 MMOL/L (ref 133–144)
TRIGL SERPL-MCNC: 53 MG/DL
WBC # BLD AUTO: 4.5 10E9/L (ref 4–11)

## 2020-11-17 PROCEDURE — 83735 ASSAY OF MAGNESIUM: CPT | Performed by: SURGERY

## 2020-11-17 PROCEDURE — 82728 ASSAY OF FERRITIN: CPT | Performed by: SURGERY

## 2020-11-17 PROCEDURE — 87040 BLOOD CULTURE FOR BACTERIA: CPT | Performed by: SURGERY

## 2020-11-17 PROCEDURE — 84255 ASSAY OF SELENIUM: CPT | Performed by: SURGERY

## 2020-11-17 PROCEDURE — 82525 ASSAY OF COPPER: CPT | Mod: GZ | Performed by: SURGERY

## 2020-11-17 PROCEDURE — 85025 COMPLETE CBC W/AUTO DIFF WBC: CPT | Performed by: SURGERY

## 2020-11-17 PROCEDURE — 83785 ASSAY OF MANGANESE: CPT | Performed by: SURGERY

## 2020-11-17 PROCEDURE — 84100 ASSAY OF PHOSPHORUS: CPT | Performed by: SURGERY

## 2020-11-17 PROCEDURE — 86140 C-REACTIVE PROTEIN: CPT | Performed by: SURGERY

## 2020-11-17 PROCEDURE — 82248 BILIRUBIN DIRECT: CPT | Performed by: SURGERY

## 2020-11-17 PROCEDURE — 84630 ASSAY OF ZINC: CPT | Mod: GZ | Performed by: SURGERY

## 2020-11-17 PROCEDURE — 80053 COMPREHEN METABOLIC PANEL: CPT | Performed by: SURGERY

## 2020-11-17 PROCEDURE — 84478 ASSAY OF TRIGLYCERIDES: CPT | Performed by: SURGERY

## 2020-11-17 ASSESSMENT — ACTIVITIES OF DAILY LIVING (ADL): DEPENDENT_IADLS:: MEDICATION MANAGEMENT;TRANSPORTATION;SHOPPING

## 2020-11-17 NOTE — PROGRESS NOTES
Clinic Care Coordination Contact    Follow Up Progress Note      Assessment: RN CC spoke with spouse Rose (consent to communicate on file) for follow up.  Patient developed a rash starting 6-7 days ago.  Rash has spread to his entire body; head, back, legs, arms, stomach.  It is red, raised and blisters in some areas.  Rash is itching and alleviated by baby powder.  Temperature ranges from .  Per Rose, blood pressure today was 98/69, heart rate 60, oxygen saturation 93%.  Rose states the home infusion nurse heard a wheeze when she listened to patient's lungs.  Rose states patient has been using his albuterol inhaler 4 times daily.  Patient is able to walk and talk without shortness of breath.  Cough is minimal.  RN CC reviewed importance of deep breathing and coughing exercises.    Patient denies diarrhea and vomiting.   Patient has been able to increase in fluid intake.  Rose states the home care nurse tata blood cultures today.    Goals addressed this encounter:   Goals Addressed                 This Visit's Progress      #1 Medical (pt-stated)   0%     Goal Statement: I want to recover from COVID-19 virus.  Date Goal set: 11/11/2020   Barriers: multiple complex diagnoses  Strengths: spouse support  Date to Achieve By: 1/1/21  Patient expressed understanding of goal: yes  Action steps to achieve this goal:  1. I will use my albuterol every 4 hours as needed.  2. I will perform deep breathing and coughing exercises.  3. I will monitor my temperature and take Tylenol as needed/directed.  4. I will seek care for emergency symptoms.              Intervention/Education provided during outreach: RN CC reviewed plan of care, when to seek care and importance of deep breathing and coughing exercises.     Outreach Frequency: monthly    Plan:   1. Patient to work on deep breathing and coughing exercises.  2. Patient and spouse to monitor for new or worsening symptoms and seek care or contact provider if these  occur.  3. RN Care Coordinator will follow up in 1-2 weeks.    Melissa Behl BSN, RN, PHN, CCM  Primary Care Clinical RN Care Coordinator  [facility]   [phone]

## 2020-11-18 ENCOUNTER — HOME INFUSION (PRE-WILLOW HOME INFUSION) (OUTPATIENT)
Dept: PHARMACY | Facility: CLINIC | Age: 50
End: 2020-11-18

## 2020-11-19 LAB
COPPER SERPL-MCNC: 145.6 UG/DL (ref 70–140)
SELENIUM SERPL-MCNC: 109.7 UG/L (ref 23–190)
ZINC SERPL-MCNC: 68.4 UG/DL (ref 60–120)

## 2020-11-20 LAB — MANGANESE BLD-MCNC: 13.3 UG/L (ref 4.2–16.5)

## 2020-11-20 NOTE — PROGRESS NOTES
This is a recent snapshot of the patient's [facility] medical record.  For current drug dose and complete information and questions, call [phone] or In Basket pool, [facility] home infusion ([id number])  CSN Number:  [id number]

## 2020-11-23 ENCOUNTER — MYC REFILL (OUTPATIENT)
Dept: FAMILY MEDICINE | Facility: CLINIC | Age: 50
End: 2020-11-23

## 2020-11-23 DIAGNOSIS — R11.0 NAUSEA: ICD-10-CM

## 2020-11-23 DIAGNOSIS — B37.2 YEAST INFECTION OF THE SKIN: ICD-10-CM

## 2020-11-23 DIAGNOSIS — E55.9 VITAMIN D DEFICIENCY: ICD-10-CM

## 2020-11-23 DIAGNOSIS — F90.0 ADHD (ATTENTION DEFICIT HYPERACTIVITY DISORDER), INATTENTIVE TYPE: ICD-10-CM

## 2020-11-23 LAB
BACTERIA SPEC CULT: NO GROWTH
SPECIMEN SOURCE: NORMAL

## 2020-11-23 RX ORDER — ERGOCALCIFEROL 1.25 MG/1
50000 CAPSULE, LIQUID FILLED ORAL WEEKLY
Qty: 12 CAPSULE | Refills: 3 | Status: CANCELLED | OUTPATIENT
Start: 2020-11-23

## 2020-11-23 RX ORDER — ONDANSETRON 8 MG/1
TABLET, ORALLY DISINTEGRATING ORAL
Qty: 90 TABLET | Refills: 1 | Status: CANCELLED | OUTPATIENT
Start: 2020-11-23

## 2020-11-24 RX ORDER — LORAZEPAM 1 MG/1
TABLET ORAL
Qty: 30 TABLET | Refills: 0 | Status: SHIPPED | OUTPATIENT
Start: 2020-11-24 | End: 2020-12-24

## 2020-11-24 RX ORDER — NYSTATIN 100000 U/G
CREAM TOPICAL 2 TIMES DAILY
Qty: 30 G | Refills: 0 | Status: SHIPPED | OUTPATIENT
Start: 2020-11-24 | End: 2021-02-28

## 2020-11-24 RX ORDER — DEXTROAMPHETAMINE SACCHARATE, AMPHETAMINE ASPARTATE, DEXTROAMPHETAMINE SULFATE AND AMPHETAMINE SULFATE 5; 5; 5; 5 MG/1; MG/1; MG/1; MG/1
TABLET ORAL
Qty: 30 TABLET | Refills: 0 | Status: SHIPPED | OUTPATIENT
Start: 2020-11-24 | End: 2020-12-24

## 2020-11-24 NOTE — TELEPHONE ENCOUNTER
adderall      Last Written Prescription Date:  10/28/20  Last Fill Quantity: 30,   # refills: 0  Last Office Visit: 11/09/20  Future Office visit:    Next 5 appointments (look out 90 days)    Dec 02, 2020  1:15 PM  Return Visit with Patti Milligan MD  [facility] [place] ([facility] [place]) [address]  [place] [address]  [phone]           Routing refill request to provider for review/approval because:  Drug not on the [facility], [facility] or [facility] refill protocol or controlled subs    nystatin      Last Written Prescription Date:  9/14/20  Last Fill Quantity: 30g,   # refills: 0  Last Office Visit: 11/09/20  Future Office visit:    Next 5 appointments (look out 90 days)    Dec 02, 2020  1:15 PM  Return Visit with Patti Milligan MD  [facility] [place] ([facility] [place]) [address]  [place] [address]  [phone]             ativan      Last Written Prescription Date:  10/28/20  Last Fill Quantity: 30,   # refills: 0  Last Office Visit: 11/09/20  Future Office visit:    Next 5 appointments (look out 90 days)    Dec 02, 2020  1:15 PM  Return Visit with Patti Milligan MD  [facility] [place] ([facility] [place]) [address]  [place] [address]  [phone]

## 2020-11-30 ENCOUNTER — HOME INFUSION (PRE-WILLOW HOME INFUSION) (OUTPATIENT)
Dept: PHARMACY | Facility: CLINIC | Age: 50
End: 2020-11-30

## 2020-11-30 ENCOUNTER — MYC MEDICAL ADVICE (OUTPATIENT)
Dept: INTERNAL MEDICINE | Facility: CLINIC | Age: 50
End: 2020-11-30

## 2020-12-01 ENCOUNTER — MYC REFILL (OUTPATIENT)
Dept: PALLIATIVE MEDICINE | Facility: CLINIC | Age: 50
End: 2020-12-01

## 2020-12-01 ENCOUNTER — MYC REFILL (OUTPATIENT)
Dept: FAMILY MEDICINE | Facility: CLINIC | Age: 50
End: 2020-12-01

## 2020-12-01 ENCOUNTER — PATIENT OUTREACH (OUTPATIENT)
Dept: CARE COORDINATION | Facility: CLINIC | Age: 50
End: 2020-12-01

## 2020-12-01 DIAGNOSIS — Z98.84 GASTRIC BYPASS STATUS FOR OBESITY: ICD-10-CM

## 2020-12-01 DIAGNOSIS — R10.9 CHRONIC ABDOMINAL PAIN: ICD-10-CM

## 2020-12-01 DIAGNOSIS — G89.4 CHRONIC PAIN SYNDROME: ICD-10-CM

## 2020-12-01 DIAGNOSIS — R05.8 PRODUCTIVE COUGH: ICD-10-CM

## 2020-12-01 DIAGNOSIS — G89.29 CHRONIC ABDOMINAL PAIN: ICD-10-CM

## 2020-12-01 DIAGNOSIS — E53.8 VITAMIN B12 DEFICIENCY (NON ANEMIC): ICD-10-CM

## 2020-12-01 RX ORDER — OXYCODONE HCL 5 MG/5 ML
5-10 SOLUTION, ORAL ORAL 3 TIMES DAILY PRN
Qty: 900 ML | Refills: 0 | Status: CANCELLED | OUTPATIENT
Start: 2020-12-01

## 2020-12-01 RX ORDER — FENTANYL 25 UG/1
1 PATCH TRANSDERMAL
Qty: 15 PATCH | Refills: 0 | Status: CANCELLED | OUTPATIENT
Start: 2020-12-01

## 2020-12-01 NOTE — PROGRESS NOTES
Clinic Care Coordination Contact  RUST/Voicemail       Clinical Data: Care Coordinator Outreach  Outreach attempted x 1.  Left message on patient's voicemail with call back information and requested return call.  Plan: Writer will hand off to new Lead Care Coordinator to try and reach patient again in approximately 10 business days.    Melissa Behl BSN, RN, PHN, [facility]  Primary Care Clinical RN Care Coordinator  [facility]   [phone]

## 2020-12-01 NOTE — PROGRESS NOTES
This is a recent snapshot of the patient's [facility] medical record.  For current drug dose and complete information and questions, call [phone] or In Basket pool, [facility]v home infusion ([id number])  CSN Number:  [id number]

## 2020-12-02 RX ORDER — FENTANYL 25 UG/1
1 PATCH TRANSDERMAL
Qty: 15 PATCH | Refills: 0 | Status: SHIPPED | OUTPATIENT
Start: 2020-12-02 | End: 2020-12-28

## 2020-12-02 RX ORDER — OXYCODONE HCL 5 MG/5 ML
5-10 SOLUTION, ORAL ORAL 3 TIMES DAILY PRN
Qty: 900 ML | Refills: 0 | Status: SHIPPED | OUTPATIENT
Start: 2020-12-02 | End: 2020-12-28

## 2020-12-02 RX ORDER — OXYCODONE HCL 5 MG/5 ML
5-10 SOLUTION, ORAL ORAL 3 TIMES DAILY PRN
Qty: 900 ML | Refills: 0 | Status: SHIPPED | OUTPATIENT
Start: 2020-12-02 | End: 2020-12-02

## 2020-12-02 NOTE — TELEPHONE ENCOUNTER
Medication refill information reviewed.     Due date for fentaNYL (DURAGESIC) 25 mcg/hr 72 hr patch 12/9/20 and oxyCODONE (ROXICODONE) 5 MG/5ML solution is 12/11/20     Prescriptions prepped for review.     Will route to provider.

## 2020-12-02 NOTE — TELEPHONE ENCOUNTER
Received call from patient requesting refill(s) of fentaNYL (DURAGESIC) 25 mcg/hr 72 hr patch and oxyCODONE (ROXICODONE) 5 MG/5ML solution    Last dispensed from pharmacy on 11/09/20 for both    Pt last seen by prescribing provider on 11/06/20  Next appt scheduled for 01/06/21-in person- note for annuals on appointment     checked in the past 6 months? Yes If no, print current report and give to RN    Last urine drug screen date 09/11/19  Current opioid agreement on file (completed within the last year) No Date of opioid agreement: 09/11/19    E-prescribe to pharmacy-[facility] - [place], [place] - [address]     Will route to nursing pool for review and preparation of prescription(s).

## 2020-12-02 NOTE — TELEPHONE ENCOUNTER
Script Eprescribed to pharmacy    Will send this to MA team to notify patient.    Signed Prescriptions:                        Disp   Refills    fentaNYL (DURAGESIC) 25 mcg/hr 72 hr patch 15 pat*0        Sig: Place 1 patch onto the skin every 48 hours remove old           patch.   May fill 12/7/20 and start 12/9/20  Authorizing Provider: BRANDYN JENKINS    oxyCODONE (ROXICODONE) 5 MG/5ML solution   900 mL 0        Sig: Take 5-10 mLs (5-10 mg) by mouth 3 times daily as           needed for pain Max of 30mg/day. Fill 12/9/20 to           start on/after 12/11/20. 30 day supply.  Authorizing Provider: BRANDYN JENKINS MD  [facility]     -of note, script resent and verified at pharmacy due to transmission error.

## 2020-12-03 ENCOUNTER — HOME INFUSION (PRE-WILLOW HOME INFUSION) (OUTPATIENT)
Dept: PHARMACY | Facility: CLINIC | Age: 50
End: 2020-12-03

## 2020-12-03 NOTE — TELEPHONE ENCOUNTER
Patient has been seen in the past 30 days, 11/9/20.  Routing to PCP to advise. Dr. Doherty, Please review. RX request went in on 12/1/20 so needs today. Thank you.  CHRSITY Hurst

## 2020-12-03 NOTE — TELEPHONE ENCOUNTER
Called pt and LVM stating refill for fentaNYL (DURAGESIC) 25 mcg/hr 72 hr patch available for  on 12/7/20 and start 12/9/20.  Also stated, Rx for oxyCODONE (ROXICODONE) 5 MG/5ML solution available for  on 12/9/20 to start on/after 12/11/20.    Lyndsay Solo MA

## 2020-12-04 ENCOUNTER — TELEPHONE (OUTPATIENT)
Dept: SURGERY | Facility: CLINIC | Age: 50
End: 2020-12-04

## 2020-12-04 RX ORDER — CYANOCOBALAMIN 1000 UG/ML
1 INJECTION, SOLUTION INTRAMUSCULAR; SUBCUTANEOUS
Qty: 1 ML | Refills: 11 | OUTPATIENT
Start: 2020-12-04

## 2020-12-04 RX ORDER — ALBUTEROL SULFATE 90 UG/1
2 AEROSOL, METERED RESPIRATORY (INHALATION) EVERY 4 HOURS PRN
Qty: 1 INHALER | Refills: 1 | OUTPATIENT
Start: 2020-12-04

## 2020-12-04 NOTE — PROGRESS NOTES
This is a recent snapshot of the patient's [facility] medical record.  For current drug dose and complete information and questions, call [phone] or In Basket pool, fv home infusion ([id number])  CSN Number:  [id number]

## 2020-12-04 NOTE — TELEPHONE ENCOUNTER
Patient's wife, Rose, called about getting an OR date for joint case with Poly Bach and Lilliam. I called the OR to see if anything was available on 2/10 but was told the OR schedule had not been opened up that far out. Told Rose I would keep an eye on dates and get back with her as soon as I could find date that will work for both surgeons. November case canceled due to patient having COVID-19.

## 2020-12-08 ENCOUNTER — HOME INFUSION (PRE-WILLOW HOME INFUSION) (OUTPATIENT)
Dept: PHARMACY | Facility: CLINIC | Age: 50
End: 2020-12-08

## 2020-12-08 ENCOUNTER — HOSPITAL ENCOUNTER (OUTPATIENT)
Dept: LAB | Facility: CLINIC | Age: 50
Discharge: HOME | End: 2020-12-08
Attending: SURGERY | Admitting: SURGERY
Payer: COMMERCIAL

## 2020-12-08 ENCOUNTER — MEDICAL CORRESPONDENCE (OUTPATIENT)
Dept: HEALTH INFORMATION MANAGEMENT | Facility: CLINIC | Age: 50
End: 2020-12-08

## 2020-12-08 DIAGNOSIS — R05.8 PRODUCTIVE COUGH: ICD-10-CM

## 2020-12-08 LAB
ALBUMIN SERPL-MCNC: 3.2 G/DL (ref 3.4–5)
ALP SERPL-CCNC: 69 U/L (ref 40–150)
ALT SERPL W P-5'-P-CCNC: 16 U/L (ref 0–70)
ANION GAP SERPL CALCULATED.3IONS-SCNC: 5 MMOL/L (ref 3–14)
AST SERPL W P-5'-P-CCNC: 10 U/L (ref 0–45)
BASOPHILS # BLD AUTO: 0 10E9/L (ref 0–0.2)
BASOPHILS NFR BLD AUTO: 0.5 %
BILIRUB DIRECT SERPL-MCNC: 0.2 MG/DL (ref 0–0.2)
BILIRUB SERPL-MCNC: 0.6 MG/DL (ref 0.2–1.3)
BUN SERPL-MCNC: 6 MG/DL (ref 7–30)
CALCIUM SERPL-MCNC: 8.2 MG/DL (ref 8.5–10.1)
CHLORIDE SERPL-SCNC: 110 MMOL/L (ref 94–109)
CO2 SERPL-SCNC: 26 MMOL/L (ref 20–32)
CREAT SERPL-MCNC: 0.68 MG/DL (ref 0.66–1.25)
DIFFERENTIAL METHOD BLD: ABNORMAL
EOSINOPHIL NFR BLD AUTO: 2.2 %
ERYTHROCYTE [DISTWIDTH] IN BLOOD BY AUTOMATED COUNT: 17.2 % (ref 10–15)
GFR SERPL CREATININE-BSD FRML MDRD: >90 ML/MIN/{1.73_M2}
GLUCOSE SERPL-MCNC: 153 MG/DL (ref 70–99)
HCT VFR BLD AUTO: 33.1 % (ref 40–53)
HGB BLD-MCNC: 10.5 G/DL (ref 13.3–17.7)
IMM GRANULOCYTES # BLD: 0 10E9/L (ref 0–0.4)
IMM GRANULOCYTES NFR BLD: 0.2 %
LYMPHOCYTES # BLD AUTO: 1.2 10E9/L (ref 0.8–5.3)
LYMPHOCYTES NFR BLD AUTO: 22.2 %
MAGNESIUM SERPL-MCNC: 2.1 MG/DL (ref 1.6–2.3)
MCH RBC QN AUTO: 28.1 PG (ref 26.5–33)
MCHC RBC AUTO-ENTMCNC: 31.7 G/DL (ref 31.5–36.5)
MCV RBC AUTO: 89 FL (ref 78–100)
MONOCYTES # BLD AUTO: 0.5 10E9/L (ref 0–1.3)
MONOCYTES NFR BLD AUTO: 9.8 %
NEUTROPHILS # BLD AUTO: 3.6 10E9/L (ref 1.6–8.3)
NEUTROPHILS NFR BLD AUTO: 65.1 %
NRBC # BLD AUTO: 0 10*3/UL
NRBC BLD AUTO-RTO: 0 /100
PHOSPHATE SERPL-MCNC: 2.6 MG/DL (ref 2.5–4.5)
PLATELET # BLD AUTO: 204 10E9/L (ref 150–450)
POTASSIUM SERPL-SCNC: 3.4 MMOL/L (ref 3.4–5.3)
PROT SERPL-MCNC: 6.4 G/DL (ref 6.8–8.8)
RBC # BLD AUTO: 3.74 10E12/L (ref 4.4–5.9)
SODIUM SERPL-SCNC: 141 MMOL/L (ref 133–144)
TRIGL SERPL-MCNC: 63 MG/DL
WBC # BLD AUTO: 5.5 10E9/L (ref 4–11)

## 2020-12-08 PROCEDURE — 84478 ASSAY OF TRIGLYCERIDES: CPT | Mod: GZ | Performed by: SURGERY

## 2020-12-08 PROCEDURE — 82248 BILIRUBIN DIRECT: CPT | Performed by: SURGERY

## 2020-12-08 PROCEDURE — 84100 ASSAY OF PHOSPHORUS: CPT | Performed by: SURGERY

## 2020-12-08 PROCEDURE — 83735 ASSAY OF MAGNESIUM: CPT | Performed by: SURGERY

## 2020-12-08 PROCEDURE — 80053 COMPREHEN METABOLIC PANEL: CPT | Performed by: SURGERY

## 2020-12-08 PROCEDURE — 85025 COMPLETE CBC W/AUTO DIFF WBC: CPT | Performed by: SURGERY

## 2020-12-08 RX ORDER — ALBUTEROL SULFATE 90 UG/1
AEROSOL, METERED RESPIRATORY (INHALATION)
Qty: 1 INHALER | Refills: 1 | Status: SHIPPED | OUTPATIENT
Start: 2020-12-08 | End: 2021-06-16

## 2020-12-08 NOTE — TELEPHONE ENCOUNTER
"  Requested Prescriptions   Pending Prescriptions Disp Refills     VENTOLIN  (90 Base) MCG/ACT inhaler [Pharmacy Med Name: VENTOLIN HFA 108MCG/ACT AERS]  1     Sig: INHALE TWO PUFFS BY MOUTH EVERY 4 HOURS AS NEEDED FOR SHORTNESS OF BREATH /DYSPNEA OR WHEEZING   11/9/2020    Asthma Maintenance Inhalers - Anticholinergics Passed - 12/8/2020  1:45 PM        Passed - Patient is age 12 years or older        Passed - Recent (12 mo) or future (30 days) visit within the authorizing provider's specialty     Patient has had an office visit with the authorizing provider or a provider within the authorizing providers department within the previous 12 mos or has a future within next 30 days. See \"Patient Info\" tab in inbasket, or \"Choose Columns\" in Meds & Orders section of the refill encounter.            Passed - Medication is active on med list       Short-Acting Beta Agonist Inhalers Protocol  Passed - 12/8/2020  1:45 PM        Passed - Patient is age 12 or older        Passed - Recent (12 mo) or future (30 days) visit within the authorizing provider's specialty     Patient has had an office visit with the authorizing provider or a provider within the authorizing providers department within the previous 12 mos or has a future within next 30 days. See \"Patient Info\" tab in inbasket, or \"Choose Columns\" in Meds & Orders section of the refill encounter.            Passed - Medication is active on med list         Prescription approved per [facility] Refill Protocol.  Ella Hammond RN      "

## 2020-12-09 ENCOUNTER — HOME INFUSION (PRE-WILLOW HOME INFUSION) (OUTPATIENT)
Dept: PHARMACY | Facility: CLINIC | Age: 50
End: 2020-12-09

## 2020-12-09 NOTE — PROGRESS NOTES
This is a recent snapshot of the patient's [facility] medical record.  For current drug dose and complete information and questions, call [phone] or In Basket pool, [facility] home infusion ([id number])  CSN Number:  [id number]

## 2020-12-10 NOTE — PROGRESS NOTES
This is a recent snapshot of the patient's [facility] medical record.  For current drug dose and complete information and questions, call [phone] or In Basket pool, [facility]v home infusion ([id number])  CSN Number:  [id number]

## 2020-12-11 ENCOUNTER — TELEPHONE (OUTPATIENT)
Dept: INTERNAL MEDICINE | Facility: CLINIC | Age: 50
End: 2020-12-11

## 2020-12-11 DIAGNOSIS — Z53.9 DIAGNOSIS NOT YET DEFINED: Primary | ICD-10-CM

## 2020-12-11 NOTE — TELEPHONE ENCOUNTER
Reason for Call:  Form, our goal is to have forms completed with 72 hours, however, some forms may require a visit or additional information.    Type of letter, form or note:  Home Health Certification    Who is the form from?: Home care    Where did the form come from: form was faxed in    What clinic location was the form placed at?: [facility]    Where the form was placed: Given to MA/RN    What number is listed as a contact on the form?: [phone]       Additional comments: Certification Period from 12/2/2020 to 1/30/2021    Call taken on 12/11/2020 at 2:04 PM by Kayy Lyon LPN

## 2020-12-14 ENCOUNTER — HOSPITAL ENCOUNTER (OUTPATIENT)
Dept: LAB | Facility: CLINIC | Age: 50
Discharge: HOME | End: 2020-12-14
Attending: SURGERY | Admitting: SURGERY
Payer: COMMERCIAL

## 2020-12-14 ENCOUNTER — MEDICAL CORRESPONDENCE (OUTPATIENT)
Dept: HEALTH INFORMATION MANAGEMENT | Facility: CLINIC | Age: 50
End: 2020-12-14

## 2020-12-14 LAB
ALBUMIN SERPL-MCNC: 3.4 G/DL (ref 3.4–5)
ALP SERPL-CCNC: 70 U/L (ref 40–150)
ALT SERPL W P-5'-P-CCNC: 16 U/L (ref 0–70)
ANION GAP SERPL CALCULATED.3IONS-SCNC: 2 MMOL/L (ref 3–14)
AST SERPL W P-5'-P-CCNC: 16 U/L (ref 0–45)
BASOPHILS # BLD AUTO: 0 10E9/L (ref 0–0.2)
BASOPHILS NFR BLD AUTO: 0.6 %
BILIRUB SERPL-MCNC: 0.5 MG/DL (ref 0.2–1.3)
BUN SERPL-MCNC: 23 MG/DL (ref 7–30)
CALCIUM SERPL-MCNC: 8.5 MG/DL (ref 8.5–10.1)
CHLORIDE SERPL-SCNC: 107 MMOL/L (ref 94–109)
CO2 SERPL-SCNC: 31 MMOL/L (ref 20–32)
CREAT SERPL-MCNC: 0.73 MG/DL (ref 0.66–1.25)
CRP SERPL-MCNC: <2.9 MG/L (ref 0–8)
DIFFERENTIAL METHOD BLD: ABNORMAL
EOSINOPHIL NFR BLD AUTO: 4.2 %
ERYTHROCYTE [DISTWIDTH] IN BLOOD BY AUTOMATED COUNT: 16.8 % (ref 10–15)
FERRITIN SERPL-MCNC: 7 NG/ML (ref 26–388)
GFR SERPL CREATININE-BSD FRML MDRD: >90 ML/MIN/{1.73_M2}
GLUCOSE SERPL-MCNC: 79 MG/DL (ref 70–99)
HCT VFR BLD AUTO: 32 % (ref 40–53)
HGB BLD-MCNC: 10.1 G/DL (ref 13.3–17.7)
IMM GRANULOCYTES # BLD: 0 10E9/L (ref 0–0.4)
IMM GRANULOCYTES NFR BLD: 0.1 %
IRON SATN MFR SERPL: 9 % (ref 15–46)
IRON SERPL-MCNC: 32 UG/DL (ref 35–180)
LYMPHOCYTES # BLD AUTO: 1.8 10E9/L (ref 0.8–5.3)
LYMPHOCYTES NFR BLD AUTO: 26.9 %
MAGNESIUM SERPL-MCNC: 2.2 MG/DL (ref 1.6–2.3)
MCH RBC QN AUTO: 28.1 PG (ref 26.5–33)
MCHC RBC AUTO-ENTMCNC: 31.6 G/DL (ref 31.5–36.5)
MCV RBC AUTO: 89 FL (ref 78–100)
MONOCYTES # BLD AUTO: 1.4 10E9/L (ref 0–1.3)
MONOCYTES NFR BLD AUTO: 20.3 %
NEUTROPHILS # BLD AUTO: 3.2 10E9/L (ref 1.6–8.3)
NEUTROPHILS NFR BLD AUTO: 47.9 %
NRBC # BLD AUTO: 0 10*3/UL
NRBC BLD AUTO-RTO: 0 /100
PHOSPHATE SERPL-MCNC: 4 MG/DL (ref 2.5–4.5)
PLATELET # BLD AUTO: 165 10E9/L (ref 150–450)
POTASSIUM SERPL-SCNC: 3.7 MMOL/L (ref 3.4–5.3)
PROT SERPL-MCNC: 7 G/DL (ref 6.8–8.8)
RBC # BLD AUTO: 3.59 10E12/L (ref 4.4–5.9)
SODIUM SERPL-SCNC: 140 MMOL/L (ref 133–144)
TIBC SERPL-MCNC: 370 UG/DL (ref 240–430)
TRIGL SERPL-MCNC: 76 MG/DL
WBC # BLD AUTO: 6.7 10E9/L (ref 4–11)

## 2020-12-14 PROCEDURE — 83540 ASSAY OF IRON: CPT | Performed by: SURGERY

## 2020-12-14 PROCEDURE — 82728 ASSAY OF FERRITIN: CPT | Mod: GZ | Performed by: SURGERY

## 2020-12-14 PROCEDURE — 83735 ASSAY OF MAGNESIUM: CPT | Performed by: SURGERY

## 2020-12-14 PROCEDURE — 86140 C-REACTIVE PROTEIN: CPT | Performed by: SURGERY

## 2020-12-14 PROCEDURE — 83550 IRON BINDING TEST: CPT | Performed by: SURGERY

## 2020-12-14 PROCEDURE — 84100 ASSAY OF PHOSPHORUS: CPT | Performed by: SURGERY

## 2020-12-14 PROCEDURE — 80053 COMPREHEN METABOLIC PANEL: CPT | Performed by: SURGERY

## 2020-12-14 PROCEDURE — 85025 COMPLETE CBC W/AUTO DIFF WBC: CPT | Performed by: SURGERY

## 2020-12-14 PROCEDURE — 84478 ASSAY OF TRIGLYCERIDES: CPT | Mod: GZ | Performed by: SURGERY

## 2020-12-15 ENCOUNTER — PATIENT OUTREACH (OUTPATIENT)
Dept: NURSING | Facility: CLINIC | Age: 50
End: 2020-12-15
Payer: COMMERCIAL

## 2020-12-15 ASSESSMENT — ACTIVITIES OF DAILY LIVING (ADL): DEPENDENT_IADLS:: MEDICATION MANAGEMENT;TRANSPORTATION;SHOPPING

## 2020-12-15 NOTE — PROGRESS NOTES
Clinic Care Coordination Contact    Follow Up Progress Note      Assessment: RN CC spoke with patient's spouse Rose (consent to communicate on file) for follow up.  Rose reports patient had greatly improved with his COVID-19 symptoms.  He is afebrile, has an occasional dry cough and is only short of breath with activity such as climbing stairs.  Patient is awaiting to be contacted for surgery for G-tube placement.  Rose states she was informed it could take 2 months.    Rose states she will call tomorrow to schedule the cardiology appointment that is due.    Goals addressed this encounter:   Goals Addressed                 This Visit's Progress      #1 Medical (pt-stated)   80%     Goal Statement: I want to recover from COVID-19 virus.  Date Goal set: 11/11/2020   Barriers: multiple complex diagnoses  Strengths: spouse support  Date to Achieve By: 1/15/21  Patient expressed understanding of goal: yes  Action steps to achieve this goal:  1. I will use my albuterol every 4 hours as needed.  2. I will perform deep breathing and coughing exercises.  3. I will monitor my temperature and take Tylenol as needed/directed.  4. I will seek care for emergency symptoms.         #2 Medical (pt-stated)   30%     Goal Statement: I want to prevent hospital visits  Date Goal set: 9/9/2020   Barriers: on chronic TPN, IV line  Strengths: home infusion, care coordination  Date to Achieve By: 3/9/21  Patient expressed understanding of goal: yes  Action steps to achieve this goal:  1. I will complete IV antibiotics as prescribed. (completed)  2. I will follow up with infectious disease provider as scheduled 9/24/20. (completed)  3. I will stay at home during COVID-19 pandemic.   4. I will monitor my temperature daily as instructed and contact [facility] for elevated temperature parameters.  5. I will have G-tube replaced as scheduled. (awaiting contact from surgery)        #3 Medical (pt-stated)   0%     Goal  Statement: I will schedule and attend an appointment with cardiology.  Date Goal set: 10/7/19  Barriers: COVID-19 pandemic, multiple specialists  Strengths: support from spouse  Date to Achieve By: 1/1/21  Patient expressed understanding of goal: yes  Action steps to achieve this goal:  1. I will schedule an appointment with cardiology  2. I will attend appointment with cardiology                  Intervention/Education provided during outreach: Reviewed plan of care with Rose     Outreach Frequency: monthly    Plan:   1. Patient will continue to monitor for any new or worsening symptoms and seek care or call if they occur.  2. Rose will call to schedule the cardiology appointment tomorrow.  3. Patient awaiting call to schedule G-tube placement.  4. RN Care Coordinator will follow up in 3-4 weeks.     Melissa Behl BSN, RN, PHN, CCM  Primary Care Clinical RN Care Coordinator  [facility]   [phone]

## 2020-12-15 NOTE — LETTER
[facility] CARE COORDINATION  [address] [facility] [address]   December 15, 2020        Parker Acevedo  [address]   [place] [address]          Dear Miguel Canales is an updated Complex Care Plan for your continued enrollment in Care Coordination. Please let us know if you have additional questions, concerns, or goals that we can assist with.    Sincerely,    Melissa Behl BSN, RN, PHN, CCM  Primary Care Clinical RN Care Coordinator  [facility]   [phone]          [facility]  Complex Care Plan  About Me:    Patient Name:  Parker Acevedo    YOB: 1970  Age:         50 year old   [place] MRN:    [id number] Telephone Information:  Home Phone [phone]   Mobile [phone]       Address:  [address] [place] [address] Email address:  [email or web address]      Emergency Contact(s)    Name Relationship Lgl Grd Work Phone Home Phone Mobile Phone   1. BERTRAND RAMOS* Spouse No  [phone]   2. ANT JEYSON * Relative No  [phone]   3. [place], [place]* Mother No  [phone]           Primary language:  English     needed? No   [place] [facility]:  [phone] op. 1  Other communication barriers: Glasses, Physical impairment  Preferred Method of Communication:  Ellyhart  Current living arrangement: I live in a private home with spouse  Mobility Status/ Medical Equipment: Independent    Health Maintenance  Health Maintenance Reviewed: Due/Overdue   Health Maintenance Due   Topic Date Due     MEDICARE ANNUAL WELLNESS VISIT  06/21/2017     URINE DRUG SCREEN  09/11/2020        My Access Plan  Medical Emergency 911   Primary Clinic Line [facility] - [phone]   24 Hour Appointment Line [phone] or  [phone]) (toll-free)   24 Hour Nurse Line [phone] (toll-free)   Preferred  Urgent Care Other   Preferred Hospital [facility]  [phone]   Preferred Pharmacy [facility] [place] - [address] [facility]     Behavioral Health Crisis Line The National Suicide Prevention Lifeline at 1-723.951.2848 or 911             My Care Team Members  Patient Care Team       Relationship Specialty Notifications Start End    Chuy Isaac MD PCP - General Internal Medicine  10/30/18     Phone: [phone] Fax: [phone]         [address] [place] [address]    Patti Milligan MD MD Pain Clinic  6/2/15     Phone: [phone] Fax: [phone]         [address] [facility] [address] [facility] [address]    Chuy Isaac MD Assigned PCP   11/11/18     Phone: [phone] Fax: [phone]         [address] [facility] [address]    Marlyn Jenkins MD MD Ophthalmology  1/29/19     Phone: [phone] Fax: [phone]         [address] [facility] [address] [facility] [address]    Marlyn Jenkins MD MD Ophthalmology  2/11/19     Phone: [phone] Fax: [phone]         [address] [facility] [address] [facility] [address]    Марина Buckner MD Assigned Infectious Disease Provider   10/23/20     Phone: [phone] Fax: [phone]         [address] [facility] [address] [facility] [address]    Francis Vyas MD Assigned Surgical Provider   10/23/20     Phone: [phone] Fax: [phone]         [address] [facility] [address] [facility] [address]    Skinny Friend DPM Assigned Musculoskeletal Provider   10/23/20     Phone: [phone] Fax: [phone] [address] [facility] [address]    Stormy Rankin, RN Lead Care Coordinator Primary Care - CC Admissions 12/1/20     Phone: [phone]                 My Care Plans  Self Management and Treatment Plan  Goals and (Comments)  Goals        General    #1 Medical (pt-stated)     Notes - Note edited  12/15/2020  2:43 PM by Behl, Melissa K, RN    Goal  Statement: I want to recover from COVID-19 virus.  Date Goal set: 11/11/2020   Barriers: multiple complex diagnoses  Strengths: spouse support  Date to Achieve By: 1/15/21  Patient expressed understanding of goal: yes  Action steps to achieve this goal:  1. I will use my albuterol every 4 hours as needed.  2. I will perform deep breathing and coughing exercises.  3. I will monitor my temperature and take Tylenol as needed/directed.  4. I will seek care for emergency symptoms.       #2 Medical (pt-stated)     Notes - Note edited  12/15/2020  2:43 PM by Behl, Melissa K, RN    Goal Statement: I want to prevent hospital visits  Date Goal set: 9/9/2020   Barriers: on chronic TPN, IV line  Strengths: home infusion, care coordination  Date to Achieve By: 3/9/21  Patient expressed understanding of goal: yes  Action steps to achieve this goal:  1. I will complete IV antibiotics as prescribed. (completed)  2. I will follow up with infectious disease provider as scheduled 9/24/20. (completed)  3. I will stay at home during COVID-19 pandemic.   4. I will monitor my temperature daily as instructed and contact [facility] for elevated temperature parameters.  5. I will have G-tube replaced as scheduled. (awaiting contact from surgery)      #3 Medical (pt-stated)     Notes - Note edited  11/11/2020 11:40 AM by Behl, Melissa K, RN    Goal Statement: I will schedule and attend an appointment with cardiology.  Date Goal set: 10/7/19  Barriers: COVID-19 pandemic, multiple specialists  Strengths: support from spouse  Date to Achieve By: 1/1/21  Patient expressed understanding of goal: yes  Action steps to achieve this goal:  1. I will schedule an appointment with cardiology  2. I will attend appointment with cardiology                  Action Plans on File:                       Advance Care Plans/Directives Type:   Type Advanced Care Plans/Directives: Advanced Directive - On File    My Medical and Care Information  Problem  List   Patient Active Problem List   Diagnosis     Peptic ulcer disease     Gastric bypass status for obesity     Chronic abdominal pain     Vomiting     Anemia     ADHD (attention deficit hyperactivity disorder), inattentive type     Vitamin B12 deficiency without anemia     Thiamine deficiency     Dehydration     Dysphagia     Weight loss, non-intentional     Malnutrition (H)     Chronic anxiety     Constipation     Bile reflux esophagitis     Former smoker     Vitamin D deficiency     Iron deficiency     Health Care Home     Chronic pain     Insomnia     Positive blood culture     Fungemia     Chronic nausea     Gastrostomy tube in place (H)     Cardiomyopathy in nutritional diseases (H)     Short gut syndrome     Anxiety     Status post cervical spinal arthrodesis     Port or reservoir infection, initial encounter     Abnormal echocardiogram     Low serum iron     Anemia, iron deficiency     Catheter-related bloodstream infection (CRBSI)     Generalized weakness     S/P bariatric surgery     Hyperlipidemia LDL goal <130     Bacteremia     Infection by Candida species     PICC line infiltration, sequela     Gram-positive bacteremia     On total parenteral nutrition     Gastric outlet obstruction      Current Medications and Allergies:  See printed Medication Report.    Care Coordination Start Date: 1/10/2019   Frequency of Care Coordination: monthly   Form Last Updated: 12/15/2020

## 2020-12-16 ENCOUNTER — TELEPHONE (OUTPATIENT)
Dept: INTERNAL MEDICINE | Facility: CLINIC | Age: 50
End: 2020-12-16

## 2020-12-16 ENCOUNTER — HOME INFUSION (PRE-WILLOW HOME INFUSION) (OUTPATIENT)
Dept: PHARMACY | Facility: CLINIC | Age: 50
End: 2020-12-16

## 2020-12-16 RX ORDER — HEPARIN SODIUM,PORCINE 10 UNIT/ML
5 VIAL (ML) INTRAVENOUS
Status: CANCELLED | OUTPATIENT
Start: 2020-12-18

## 2020-12-16 RX ORDER — HEPARIN SODIUM (PORCINE) LOCK FLUSH IV SOLN 100 UNIT/ML 100 UNIT/ML
5 SOLUTION INTRAVENOUS
Status: CANCELLED | OUTPATIENT
Start: 2020-12-18

## 2020-12-16 NOTE — TELEPHONE ENCOUNTER
Iron sucrose orders are placed in the therapy section.  Please help him get this set up with the infusion center.

## 2020-12-16 NOTE — TELEPHONE ENCOUNTER
Esther Isaac    Parker had an iron panel collected this week for monitoring of his home IV TPN therapy.  Results are suggestive of iron deficiency.      He has had iron replaced IV in the past, from my records it looks like he last received IV iron in 2017.      I would recommend Iron Sucrose 200 mg IV every 2-3 days x5 doses.  Parker has had Injectafer in the past, but I would recommend avoiding this due to risk for hypophosphatemia.     If you agree, could the clinic arrange for him to have infusions at the outpatient infusion center?  Unfortunately, his current insurance will not cover this at home.     Thank you!  Jyothi Peraza, PharmD [facility]   Ph: [phone]  Fax: [phone]

## 2020-12-17 NOTE — PROGRESS NOTES
This is a recent snapshot of the patient's [facility] medical record.  For current drug dose and complete information and questions, call [phone] or In Basket pool, [facility] ([id number])  CSN Number:  [id number]

## 2020-12-18 RX ORDER — HEPARIN SODIUM (PORCINE) LOCK FLUSH IV SOLN 100 UNIT/ML 100 UNIT/ML
5 SOLUTION INTRAVENOUS
Status: CANCELLED | OUTPATIENT
Start: 2020-12-21

## 2020-12-18 RX ORDER — HEPARIN SODIUM,PORCINE 10 UNIT/ML
5 VIAL (ML) INTRAVENOUS
Status: CANCELLED | OUTPATIENT
Start: 2020-12-21

## 2020-12-18 NOTE — TELEPHONE ENCOUNTER
Due to lack of insurance covereage, changed to Feraheme two dose regiment after pharmacy assistance.  Orders are signed.

## 2020-12-24 ENCOUNTER — MYC REFILL (OUTPATIENT)
Dept: PALLIATIVE MEDICINE | Facility: CLINIC | Age: 50
End: 2020-12-24

## 2020-12-24 ENCOUNTER — MYC REFILL (OUTPATIENT)
Dept: FAMILY MEDICINE | Facility: CLINIC | Age: 50
End: 2020-12-24

## 2020-12-24 DIAGNOSIS — Z79.891 ENCOUNTER FOR LONG-TERM OPIATE ANALGESIC USE: Primary | ICD-10-CM

## 2020-12-24 DIAGNOSIS — G89.4 CHRONIC PAIN SYNDROME: ICD-10-CM

## 2020-12-24 DIAGNOSIS — G89.29 CHRONIC ABDOMINAL PAIN: ICD-10-CM

## 2020-12-24 DIAGNOSIS — F90.0 ADHD (ATTENTION DEFICIT HYPERACTIVITY DISORDER), INATTENTIVE TYPE: ICD-10-CM

## 2020-12-24 DIAGNOSIS — R10.9 CHRONIC ABDOMINAL PAIN: ICD-10-CM

## 2020-12-24 RX ORDER — FENTANYL 25 UG/1
1 PATCH TRANSDERMAL
Qty: 15 PATCH | Refills: 0 | Status: CANCELLED | OUTPATIENT
Start: 2020-12-24

## 2020-12-24 RX ORDER — OXYCODONE HCL 5 MG/5 ML
5-10 SOLUTION, ORAL ORAL 3 TIMES DAILY PRN
Qty: 900 ML | Refills: 0 | Status: CANCELLED | OUTPATIENT
Start: 2020-12-24

## 2020-12-24 NOTE — LETTER
[facility]  01/05/21    Patient: Parker Acevedo  YOB: 1970  Medical Record Number: [id number]                                                                  Opioid / Opioid Plus Controlled Substance Agreement    I understand that my care provider has prescribed an opioid (narcotic) controlled substance to help manage my condition(s). I am taking this medicine to help me function or work. I know this is strong medicine, and that it can cause serious side effects. Opioid medicine can be sedating, addicting and may cause a dependency on the drug. They can affect my ability to drive or think, and cause depression. They need to be taken exactly as prescribed. Combining opioids with certain medicines or chemicals (such as cocaine, sedatives and tranquilizers, sleeping pills, meth) can be dangerous or even fatal. Also, if I stop opioids suddenly, I may have severe withdrawal symptoms. Last, I understand that opioids do not work for all types of pain nor for all patients. If not helpful, I may be asked to stop them.        The risks, benefits, and side effects of these medicine(s) were explained to me. I agree that:    1. I will take part in other treatments as advised by my care team. This may be psychiatry or counseling, physical therapy, behavioral therapy, group treatment or a referral to a pain clinic. I will reduce or stop my medicine when my care team tells me to do so.  2. I will take my medicines as prescribed. I will not change the dose or schedule unless my care team tells me to. There will be no refills if I  run out early.   I may be contactedwithout warning and asked to complete a urine drug test or pill count at any time.   3. I will keep all my appointments, and understand this is part of the monitoring of opioids. My care team may require an office visit for EVERY opioid/controlled substance refill. If I miss appointments or don t follow instructions, my care team  may stop my medicine.  4. I will not ask other providers to prescribe controlled substances, and I will not accept controlled substances from other people. If I need another prescribed controlled substance for a new reason, I will tell my care team within 1 business day.  5. I will use one pharmacy to fill all of my controlled substance prescriptions, and it is up to me to make sure that I do not run out of my medicines on weekends or holidays. If my care team is willing to refill my opioid prescription without a visit, I must request refills only during office hours, refills may take up to 3 days to process, and it may take up to 5 to 7 days for my medicine to be mailed and ready at my pharmacy. Prescriptions will not be mailed anywhere except my pharmacy.        [id number]  Rev 12/18         Registration to scan to EHR                             Page 1 of 2               Controlled Substance Agreement [facility]  01/05/21  Patient: Parker Acevedo  YOB: 1970  Medical Record Number: [id number]                                                                  6. I am responsible for my prescriptions. If the medicine/prescription is lost or stolen, it will not be replaced. I also agree not to share controlled substance medicines with anyone.  7. I agree to not use ANY illegal or recreational drugs. This includes marijuana, cocaine, bath salts or other drugs. I agree not to use alcohol unless my care team says I may.          I agree to give urine samples whenever asked. If I don t give a urine sample, the care team may stop my medicine.    8. If I enroll in the [place] Medical Marijuana program, I will tell my care team. I will also sign an agreement to share my medical records with my care team.   9. I will bring in my list of medicines (or my medicine bottles) each time I come to the clinic.   10. I will tell my care team right away if I become pregnant or have a  new medical problem treated outside of my regular clinic.  11. I understand that this medicine can affect my thinking and judgment. It may be unsafe for me to drive, use machinery and do dangerous tasks. I will not do any of these things until I know how the medicine affects me. If my dose changes, I will wait to see how it affects me. I will contact my care team if I have concerns about medicine side effects.    I understand that if I do not follow any of the conditions above, my prescriptions or treatment may be stopped.      I agree that my provider, clinic care team, and pharmacy may work with any city, state or federal law enforcement agency that investigates the misuse, sale, or other diversion of my controlled medicine. I will allow my provider to discuss my care with or share a copy of this agreement with any other treating provider, pharmacy or emergency room where I receive care. I agree to give up (waive) any right of privacy or confidentiality with respect to these consents.     I have read this agreement and have asked questions about anything I did not understand.      ________________________________________________________________________  Patient signature - Date/Time -  Parker Acevedo                                      ________________________________________________________________________  Witness signature                                                            ________________________________________________________________________  Provider signature - Patti Milligan MD      [id number]  Rev 12/18         Registration to scan to EHR                         Page 2 of 2                   Controlled Substance Agreement Opioid           Page 1 of 2  Opioid Pain Medicines (also known as Narcotics)  What You Need to Know    What are opioids?   Opioids are pain medicines that must be prescribed by a doctor.  They are also known as narcotics.    Examples are:     morphine (MS Contin,  Do)    oxycodone (Oxycontin)    oxycodone and acetaminophen (Percocet)    hydrocodone and acetaminophen (Vicodin, Norco)     fentanyl patch (Duragesic)     hydromorphone (Dilaudid)     methadone     What do opioids do well?   Opioids are best for short-term pain after a surgery or injury. They also work well for cancer pain. Unlike other pain medicines, they do not cause liver or kidney failure or ulcers. They may help some people with long-lasting (chronic) pain.     What do opioids NOT do well?   Opioids never get rid of pain entirely, and they do not work well for most patients with chronic pain. Opioids do not reduce swelling, one of the causes of pain. They also don t work well for nerve pain.                           For informational purposes only.  Not to replace the advice of your care provider.  Copyright [address] [facility]. All right reserved. [employer] 207228-Rfd 02/18.      Page 2 of 2    Risks and side effects   Talk to your doctor before you start or decide to keep taking one of these medicines. Side effects include:    Lowering your breathing rate enough to cause death    Overdose, including death, especially if taking higher than prescribed doses    Long-term opioid use    Worse depression symptoms; less pleasure in things you usually enjoy    Feeling tired or sluggish    Slower thoughts or cloudy thinking    Being more sensitive to pain over time; pain is harder to control    Trouble sleeping or restless sleep    Changes in hormone levels (for example, less testosterone)    Changes in sex drive or ability to have sex    Constipation    Unsafe driving    Itching and sweating    Feeling dizzy    Nausea, vomiting and dry mouth    What else should I know about opioids?  When someone takes opioids for too long or too often, they become dependent. This means that if you stop or reduce the medicine too quickly, you will have withdrawal symptoms.    Dependence is not the same as addiction.  Addiction is when people keep using a substance that harms their body, their mind or their relations with others. If you have a history of drug or alcohol abuse, taking opioids can cause a relapse.    Over time, opioids don t work as well. Most people will need higher and higher doses. The higher the dose, the more serious the side effects. We don t know the long-term effects of opioids.      Prescribed opioids aren't the best way to manage chronic pain    Other ways to manage pain include:      Ibuprofen or acetaminophen.  You should always try this first.      Treat health problems that may be causing pain.      acupuncture or massage, deep breathing, meditation, visual imagery, aromatherapy.      Use heat or ice at the pain site      Physical therapy and exercise      Stop smoking      See a counselor or therapist                                                  People who have used opioids for a long time may have a lower quality of life, worse depression, higher levels of pain and more visits to doctors.    Never share your opioids with others. Be sure to store opioids in a secure place, locked if possible.Young children can easily swallow them and overdose.     You can overdose on opioids.  Signs of overdose include decrease or loss of consciousness, slowed breathing, trouble waking and blue lips.  If someone is worried about overdose, they should call 911.    If you are at risk for overdose, you may get naloxone (Narcan, a medicine that reverses the effects of opioids.  If you overdose, a friend or family member can give you Narcan while waiting for the ambulance.  They need to know the signs of overdose and how to give Narcan.    While you're taking opioids:    Don't use alcohol or street drugs. Taking them together can cause death.    Don't take any of these medicines unless your doctor says its okay.  Taking these with opioids can cause death.    Benzodiazepines (such as lorazepam         or  diazepam)    Muscle relaxers (such as cyclobenzaprine)    sleeping pills    other opioids    Safe disposal of opioids  Find your area drug take-back program, your pharmacy mail-back program, buy a special disposal bag (such as Deterra) from your pharmacy or flush them down the toilet.  Use the guidelines at:  www.fda.gov/drugs/resourcesforyou

## 2020-12-28 RX ORDER — DEXTROAMPHETAMINE SACCHARATE, AMPHETAMINE ASPARTATE, DEXTROAMPHETAMINE SULFATE AND AMPHETAMINE SULFATE 5; 5; 5; 5 MG/1; MG/1; MG/1; MG/1
TABLET ORAL
Qty: 30 TABLET | Refills: 0 | Status: SHIPPED | OUTPATIENT
Start: 2020-12-28 | End: 2021-01-22

## 2020-12-28 RX ORDER — LORAZEPAM 1 MG/1
TABLET ORAL
Qty: 30 TABLET | Refills: 0 | Status: SHIPPED | OUTPATIENT
Start: 2020-12-28 | End: 2021-01-22

## 2020-12-28 NOTE — TELEPHONE ENCOUNTER
Adderall      Last Written Prescription Date:  11/24/2020  Last Fill Quantity: 30,   # refills: 0  Last Office Visit: 11/9/20  Future Office visit:    Next 5 appointments (look out 90 days)    Jan 06, 2021  2:15 PM  Return Visit with Patti Milligan MD  [facility] ([facility] [address] [facility]  [place] [address]  [phone]           Routing refill request to provider for review/approval because:  Drug not on the [facility], [facility] or [facility] refill protocol or controlled substance    ativan      Last Written Prescription Date:  11/24/20  Last Fill Quantity: 30,   # refills: 0  Last Office Visit: 11/9/20  Future Office visit:    Next 5 appointments (look out 90 days)    Jan 06, 2021  2:15 PM  Return Visit with Patti Milligan MD  [facility] ([facility]) [address] [facility]  [place] [address]  [phone]           Routing refill request to provider for review/approval because:  Drug not on the [facility], [facility] or [facility] refill protocol or controlled substance

## 2020-12-28 NOTE — TELEPHONE ENCOUNTER
Medication refill information reviewed. Patient is scheduled for a follow up on 1/6/20. Please review.    Due date for     oxyCODONE (ROXICODONE) 5 MG/5ML solution  is 1/10/20     fentaNYL (DURAGESIC) 25 mcg/hr 72 hr patch is 1/8/20     Prescriptions prepped for review.     Will route to provider.

## 2020-12-28 NOTE — TELEPHONE ENCOUNTER
Patient requesting refill(s) ofoxyCODONE (ROXICODONE) 5 MG/5ML solution    Last dispensed from pharmacy on 12/07/2020     fentaNYL (DURAGESIC) 25 mcg/hr 72 hr patch   Last dispensed from pharmacy on 12/07/2020    Patient's last office/virtual visit by prescribing provider on 11/06/2020   Next office/virtual appointment scheduled for 1/6/2021     checked in the past 6 months? Yes If no, print current report and give to RN    Last urine drug screen date 11/06/2020  Current opioid agreement on file (completed within the last year) no Date of opioid agreement: 09/11/2019    E-prescribe to  [facility]     Will route to nursing Florence for review and preparation of prescription(s).

## 2020-12-29 ENCOUNTER — MEDICAL CORRESPONDENCE (OUTPATIENT)
Dept: HEALTH INFORMATION MANAGEMENT | Facility: CLINIC | Age: 50
End: 2020-12-29

## 2020-12-29 ENCOUNTER — MYC REFILL (OUTPATIENT)
Dept: PALLIATIVE MEDICINE | Facility: CLINIC | Age: 50
End: 2020-12-29

## 2020-12-29 ENCOUNTER — HOSPITAL ENCOUNTER (OUTPATIENT)
Dept: LAB | Facility: CLINIC | Age: 50
Discharge: HOME | End: 2020-12-29
Attending: SURGERY | Admitting: SURGERY
Payer: COMMERCIAL

## 2020-12-29 DIAGNOSIS — G89.29 CHRONIC ABDOMINAL PAIN: ICD-10-CM

## 2020-12-29 DIAGNOSIS — G89.4 CHRONIC PAIN SYNDROME: ICD-10-CM

## 2020-12-29 DIAGNOSIS — R10.9 CHRONIC ABDOMINAL PAIN: ICD-10-CM

## 2020-12-29 LAB
ALBUMIN SERPL-MCNC: 3.2 G/DL (ref 3.4–5)
ALP SERPL-CCNC: 71 U/L (ref 40–150)
ALT SERPL W P-5'-P-CCNC: 17 U/L (ref 0–70)
ANION GAP SERPL CALCULATED.3IONS-SCNC: 5 MMOL/L (ref 3–14)
AST SERPL W P-5'-P-CCNC: 12 U/L (ref 0–45)
BASOPHILS # BLD AUTO: 0 10E9/L (ref 0–0.2)
BASOPHILS NFR BLD AUTO: 0.4 %
BILIRUB SERPL-MCNC: 0.3 MG/DL (ref 0.2–1.3)
BUN SERPL-MCNC: 16 MG/DL (ref 7–30)
CALCIUM SERPL-MCNC: 8.2 MG/DL (ref 8.5–10.1)
CHLORIDE SERPL-SCNC: 111 MMOL/L (ref 94–109)
CO2 SERPL-SCNC: 28 MMOL/L (ref 20–32)
CREAT SERPL-MCNC: 0.62 MG/DL (ref 0.66–1.25)
DIFFERENTIAL METHOD BLD: ABNORMAL
EOSINOPHIL NFR BLD AUTO: 4.5 %
ERYTHROCYTE [DISTWIDTH] IN BLOOD BY AUTOMATED COUNT: 16.5 % (ref 10–15)
GFR SERPL CREATININE-BSD FRML MDRD: >90 ML/MIN/{1.73_M2}
GLUCOSE SERPL-MCNC: 100 MG/DL (ref 70–99)
HCT VFR BLD AUTO: 34.8 % (ref 40–53)
HGB BLD-MCNC: 11.1 G/DL (ref 13.3–17.7)
IMM GRANULOCYTES # BLD: 0 10E9/L (ref 0–0.4)
IMM GRANULOCYTES NFR BLD: 0.3 %
LYMPHOCYTES # BLD AUTO: 1.5 10E9/L (ref 0.8–5.3)
LYMPHOCYTES NFR BLD AUTO: 21.8 %
MAGNESIUM SERPL-MCNC: 2.1 MG/DL (ref 1.6–2.3)
MCH RBC QN AUTO: 27.5 PG (ref 26.5–33)
MCHC RBC AUTO-ENTMCNC: 31.9 G/DL (ref 31.5–36.5)
MCV RBC AUTO: 86 FL (ref 78–100)
MONOCYTES # BLD AUTO: 0.7 10E9/L (ref 0–1.3)
MONOCYTES NFR BLD AUTO: 10.8 %
NEUTROPHILS # BLD AUTO: 4.2 10E9/L (ref 1.6–8.3)
NEUTROPHILS NFR BLD AUTO: 62.2 %
NRBC # BLD AUTO: 0 10*3/UL
NRBC BLD AUTO-RTO: 0 /100
PHOSPHATE SERPL-MCNC: 3.4 MG/DL (ref 2.5–4.5)
PLATELET # BLD AUTO: 211 10E9/L (ref 150–450)
POTASSIUM SERPL-SCNC: 3.7 MMOL/L (ref 3.4–5.3)
PROT SERPL-MCNC: 6.5 G/DL (ref 6.8–8.8)
RBC # BLD AUTO: 4.03 10E12/L (ref 4.4–5.9)
SODIUM SERPL-SCNC: 144 MMOL/L (ref 133–144)
TRIGL SERPL-MCNC: 77 MG/DL
WBC # BLD AUTO: 6.7 10E9/L (ref 4–11)

## 2020-12-29 PROCEDURE — 84478 ASSAY OF TRIGLYCERIDES: CPT | Performed by: SURGERY

## 2020-12-29 PROCEDURE — 84100 ASSAY OF PHOSPHORUS: CPT | Performed by: SURGERY

## 2020-12-29 PROCEDURE — 85025 COMPLETE CBC W/AUTO DIFF WBC: CPT | Mod: GZ | Performed by: SURGERY

## 2020-12-29 PROCEDURE — 80053 COMPREHEN METABOLIC PANEL: CPT | Performed by: SURGERY

## 2020-12-29 PROCEDURE — 83735 ASSAY OF MAGNESIUM: CPT | Performed by: SURGERY

## 2020-12-29 RX ORDER — OXYCODONE HCL 5 MG/5 ML
5-10 SOLUTION, ORAL ORAL 3 TIMES DAILY PRN
Qty: 900 ML | Refills: 0 | Status: SHIPPED | OUTPATIENT
Start: 2020-12-29 | End: 2021-01-29

## 2020-12-29 RX ORDER — FENTANYL 25 UG/1
1 PATCH TRANSDERMAL
Qty: 15 PATCH | Refills: 0 | Status: CANCELLED | OUTPATIENT
Start: 2020-12-29

## 2020-12-29 RX ORDER — OXYCODONE HCL 5 MG/5 ML
5-10 SOLUTION, ORAL ORAL 3 TIMES DAILY PRN
Qty: 900 ML | Refills: 0 | Status: CANCELLED | OUTPATIENT
Start: 2020-12-29

## 2020-12-29 RX ORDER — FENTANYL 25 UG/1
1 PATCH TRANSDERMAL
Qty: 15 PATCH | Refills: 0 | Status: SHIPPED | OUTPATIENT
Start: 2020-12-29 | End: 2021-01-29

## 2020-12-29 NOTE — TELEPHONE ENCOUNTER
From: Jyothi Winchester, RN      Created: 12/29/2020 11:40 AM        Esther Chawla prescriptions have been sent to the pharmacy. They can be filled on 1/6/20 and started on or after 1/8/20.      Parker is scheduled for a visit with Dr Milligan on 1/6/21 at 2:15 pm. We need to change that to a video visit. Please let me know that you received this information.      We also need to update Susiejohn annual requirements.     The order for the drug screen is signed. Please call your local lab and schedule that this week if you are able.      Please call me at [phone] and we can review and sign the opioid agreement over the phone.      Let me know if there are any questions about this plan. Thank you!     ERIN Lazar, RN  Care Coordinator  [facility]

## 2020-12-29 NOTE — TELEPHONE ENCOUNTER
Refill completed. Please inform him.  He needs  1- a virtual visit  2- can we do a UDS at his local clinic this week, and send him paperwork?  He had covid in Nov, but if there are concerns about going into clinic, this can be delayed.    Signed Prescriptions:                        Disp   Refills    fentaNYL (DURAGESIC) 25 mcg/hr 72 hr patch 15 pat*0        Sig: Place 1 patch onto the skin every 48 hours remove old           patch.   May fill 1/6/20 and start 1/8/20  Authorizing Provider: BRANDYN JENKINS    oxyCODONE (ROXICODONE) 5 MG/5ML solution   900 mL 0        Sig: Take 5-10 mLs (5-10 mg) by mouth 3 times daily as           needed for pain Max of 30mg/day. Fill 1/8/20 to           start on/after 1/10/20. 30 day supply.  Authorizing Provider: BRANDYN JENKINS MD  [facility]   .

## 2020-12-30 ENCOUNTER — HOME INFUSION (PRE-WILLOW HOME INFUSION) (OUTPATIENT)
Dept: PHARMACY | Facility: CLINIC | Age: 50
End: 2020-12-30

## 2020-12-31 ENCOUNTER — HOME INFUSION (PRE-WILLOW HOME INFUSION) (OUTPATIENT)
Dept: PHARMACY | Facility: CLINIC | Age: 50
End: 2020-12-31

## 2020-12-31 NOTE — PROGRESS NOTES
This is a recent snapshot of the patient's [facility] medical record.  For current drug dose and complete information and questions, call [phone] or In Basket pool, fv home infusion ([id number])  CSN Number:  [id number]

## 2021-01-04 ENCOUNTER — HOME INFUSION (PRE-WILLOW HOME INFUSION) (OUTPATIENT)
Dept: PHARMACY | Facility: CLINIC | Age: 51
End: 2021-01-04

## 2021-01-04 NOTE — PROGRESS NOTES
This is a recent snapshot of the patient's [facility] medical record.  For current drug dose and complete information and questions, call [phone] or In Basket pool, fv home infusion ([id number])  CSN Number:  [id number]

## 2021-01-04 NOTE — TELEPHONE ENCOUNTER
From: Parker Acevedo      Created: 1/4/2021 1:13 PM        Yes we received this message and yes we can do video chat on the 6th

## 2021-01-05 ENCOUNTER — TELEPHONE (OUTPATIENT)
Dept: SURGERY | Facility: CLINIC | Age: 51
End: 2021-01-05

## 2021-01-05 NOTE — TELEPHONE ENCOUNTER
Wife Rose left  message inquiring about possible dates for Parker's endoscopic-guided ultrasound with Dr. Bach and G-tube placement with Dr. Vyas. Message sent to Dr. Bach's staff for possible OR dates.

## 2021-01-05 NOTE — TELEPHONE ENCOUNTER
From: Jyothi Winchester, RN      Created: 1/5/2021 4:08 PM        Francisco Canales, Just as a reminder please schedule the urine dug screen for next Friday at your lab. Your appointment on 1/20/21 will be switched to a video visit. Thanks!     ERIN Lazar, RN  Care Coordinator  [facility] [place]        Lab order signed.   Reviewed controlled substance agreement with patient and the patient stated understanding.  Patient agreed to have me sign on his behalf. Signed agreement and a  copy was given to the patient via [facility].    ERIN Lazar, RN  Care Coordinator  [facility] [place]  .

## 2021-01-05 NOTE — PROGRESS NOTES
This is a recent snapshot of the patient's [facility] medical record.  For current drug dose and complete information and questions, call [phone] or In Basket pool, fv home infusion ([id number])  CSN Number:  [id number]

## 2021-01-07 ENCOUNTER — TELEPHONE (OUTPATIENT)
Dept: SURGERY | Facility: CLINIC | Age: 51
End: 2021-01-07

## 2021-01-07 DIAGNOSIS — Z20.822 ENCOUNTER FOR LABORATORY TESTING FOR COVID-19 VIRUS: Primary | ICD-10-CM

## 2021-01-07 NOTE — TELEPHONE ENCOUNTER
I called patient's wife, Rose, to let her know we have a surgery date of 2/24/21 for the joint case with Poly Vyas and Mikala. I told her I did not have an exact time yet.   Parker will need a COVID-19 test as he was diagnosed on 11/1/20.  I did request Dr. Vyas's nurse to put an order in. Told Rose she would need to have the testing done on Saturday, 2/20. Did tell her Parker would need a preoperative H&P done as well, within 30 days of the surgery date.

## 2021-01-12 ENCOUNTER — MEDICAL CORRESPONDENCE (OUTPATIENT)
Dept: HEALTH INFORMATION MANAGEMENT | Facility: CLINIC | Age: 51
End: 2021-01-12

## 2021-01-12 ENCOUNTER — HOME INFUSION (PRE-WILLOW HOME INFUSION) (OUTPATIENT)
Dept: PHARMACY | Facility: CLINIC | Age: 51
End: 2021-01-12

## 2021-01-12 ENCOUNTER — MYC MEDICAL ADVICE (OUTPATIENT)
Dept: INTERNAL MEDICINE | Facility: CLINIC | Age: 51
End: 2021-01-12

## 2021-01-12 ENCOUNTER — HOSPITAL ENCOUNTER (OUTPATIENT)
Dept: LAB | Facility: CLINIC | Age: 51
Discharge: HOME | End: 2021-01-12
Attending: SURGERY | Admitting: SURGERY
Payer: COMMERCIAL

## 2021-01-12 LAB
ALBUMIN SERPL-MCNC: 3.8 G/DL (ref 3.4–5)
ALP SERPL-CCNC: 80 U/L (ref 40–150)
ALT SERPL W P-5'-P-CCNC: 25 U/L (ref 0–70)
ANION GAP SERPL CALCULATED.3IONS-SCNC: 3 MMOL/L (ref 3–14)
AST SERPL W P-5'-P-CCNC: 21 U/L (ref 0–45)
BASOPHILS # BLD AUTO: 0.1 10E9/L (ref 0–0.2)
BASOPHILS NFR BLD AUTO: 1.1 %
BILIRUB DIRECT SERPL-MCNC: 0.2 MG/DL (ref 0–0.2)
BILIRUB SERPL-MCNC: 0.7 MG/DL (ref 0.2–1.3)
BUN SERPL-MCNC: 15 MG/DL (ref 7–30)
CALCIUM SERPL-MCNC: 8.5 MG/DL (ref 8.5–10.1)
CHLORIDE SERPL-SCNC: 107 MMOL/L (ref 94–109)
CO2 SERPL-SCNC: 30 MMOL/L (ref 20–32)
CREAT SERPL-MCNC: 0.91 MG/DL (ref 0.66–1.25)
DIFFERENTIAL METHOD BLD: ABNORMAL
EOSINOPHIL NFR BLD AUTO: 3.1 %
ERYTHROCYTE [DISTWIDTH] IN BLOOD BY AUTOMATED COUNT: 17.3 % (ref 10–15)
GFR SERPL CREATININE-BSD FRML MDRD: >90 ML/MIN/{1.73_M2}
GLUCOSE SERPL-MCNC: 75 MG/DL (ref 70–99)
HCT VFR BLD AUTO: 32.6 % (ref 40–53)
HGB BLD-MCNC: 10.2 G/DL (ref 13.3–17.7)
IMM GRANULOCYTES # BLD: 0 10E9/L (ref 0–0.4)
IMM GRANULOCYTES NFR BLD: 0.2 %
LYMPHOCYTES # BLD AUTO: 2.3 10E9/L (ref 0.8–5.3)
LYMPHOCYTES NFR BLD AUTO: 35.3 %
MAGNESIUM SERPL-MCNC: 2.3 MG/DL (ref 1.6–2.3)
MCH RBC QN AUTO: 27.9 PG (ref 26.5–33)
MCHC RBC AUTO-ENTMCNC: 31.3 G/DL (ref 31.5–36.5)
MCV RBC AUTO: 89 FL (ref 78–100)
MONOCYTES # BLD AUTO: 1 10E9/L (ref 0–1.3)
MONOCYTES NFR BLD AUTO: 14.7 %
NEUTROPHILS # BLD AUTO: 3 10E9/L (ref 1.6–8.3)
NEUTROPHILS NFR BLD AUTO: 45.6 %
NRBC # BLD AUTO: 0 10*3/UL
NRBC BLD AUTO-RTO: 0 /100
PHOSPHATE SERPL-MCNC: 3.1 MG/DL (ref 2.5–4.5)
PLATELET # BLD AUTO: 181 10E9/L (ref 150–450)
POTASSIUM SERPL-SCNC: 3.7 MMOL/L (ref 3.4–5.3)
PROT SERPL-MCNC: 7 G/DL (ref 6.8–8.8)
RBC # BLD AUTO: 3.66 10E12/L (ref 4.4–5.9)
SODIUM SERPL-SCNC: 140 MMOL/L (ref 133–144)
TRIGL SERPL-MCNC: 72 MG/DL
WBC # BLD AUTO: 6.5 10E9/L (ref 4–11)

## 2021-01-12 PROCEDURE — 80053 COMPREHEN METABOLIC PANEL: CPT | Performed by: SURGERY

## 2021-01-12 PROCEDURE — 84478 ASSAY OF TRIGLYCERIDES: CPT | Performed by: SURGERY

## 2021-01-12 PROCEDURE — 84100 ASSAY OF PHOSPHORUS: CPT | Performed by: SURGERY

## 2021-01-12 PROCEDURE — 85025 COMPLETE CBC W/AUTO DIFF WBC: CPT | Performed by: SURGERY

## 2021-01-12 PROCEDURE — 83735 ASSAY OF MAGNESIUM: CPT | Performed by: SURGERY

## 2021-01-12 PROCEDURE — 82248 BILIRUBIN DIRECT: CPT | Performed by: SURGERY

## 2021-01-13 ENCOUNTER — INFUSION THERAPY VISIT (OUTPATIENT)
Dept: INFUSION THERAPY | Facility: CLINIC | Age: 51
End: 2021-01-13
Attending: INTERNAL MEDICINE
Payer: COMMERCIAL

## 2021-01-13 ENCOUNTER — TELEPHONE (OUTPATIENT)
Dept: PALLIATIVE MEDICINE | Facility: CLINIC | Age: 51
End: 2021-01-13

## 2021-01-13 ENCOUNTER — HOME INFUSION (PRE-WILLOW HOME INFUSION) (OUTPATIENT)
Dept: PHARMACY | Facility: CLINIC | Age: 51
End: 2021-01-13

## 2021-01-13 ENCOUNTER — MYC MEDICAL ADVICE (OUTPATIENT)
Dept: PALLIATIVE MEDICINE | Facility: CLINIC | Age: 51
End: 2021-01-13

## 2021-01-13 VITALS
HEART RATE: 60 BPM | TEMPERATURE: 98 F | RESPIRATION RATE: 12 BRPM | SYSTOLIC BLOOD PRESSURE: 136 MMHG | OXYGEN SATURATION: 99 % | DIASTOLIC BLOOD PRESSURE: 78 MMHG

## 2021-01-13 DIAGNOSIS — R13.10 DYSPHAGIA, UNSPECIFIED TYPE: ICD-10-CM

## 2021-01-13 DIAGNOSIS — Z79.891 ENCOUNTER FOR LONG-TERM OPIATE ANALGESIC USE: ICD-10-CM

## 2021-01-13 DIAGNOSIS — E61.1 LOW SERUM IRON: Primary | ICD-10-CM

## 2021-01-13 DIAGNOSIS — D50.8 OTHER IRON DEFICIENCY ANEMIA: ICD-10-CM

## 2021-01-13 PROCEDURE — 258N000003 HC RX IP 258 OP 636: Performed by: INTERNAL MEDICINE

## 2021-01-13 PROCEDURE — 250N000011 HC RX IP 250 OP 636: Performed by: INTERNAL MEDICINE

## 2021-01-13 PROCEDURE — 80307 DRUG TEST PRSMV CHEM ANLYZR: CPT | Performed by: PSYCHIATRY & NEUROLOGY

## 2021-01-13 PROCEDURE — 96374 THER/PROPH/DIAG INJ IV PUSH: CPT

## 2021-01-13 PROCEDURE — 96365 THER/PROPH/DIAG IV INF INIT: CPT

## 2021-01-13 RX ORDER — HEPARIN SODIUM,PORCINE 10 UNIT/ML
5 VIAL (ML) INTRAVENOUS
Status: CANCELLED | OUTPATIENT
Start: 2021-01-16

## 2021-01-13 RX ORDER — HEPARIN SODIUM (PORCINE) LOCK FLUSH IV SOLN 100 UNIT/ML 100 UNIT/ML
5 SOLUTION INTRAVENOUS
Status: CANCELLED | OUTPATIENT
Start: 2021-01-16

## 2021-01-13 RX ADMIN — FERUMOXYTOL 510 MG: 510 INJECTION INTRAVENOUS at 10:29

## 2021-01-13 RX ADMIN — SODIUM CHLORIDE 250 ML: 9 INJECTION, SOLUTION INTRAVENOUS at 10:15

## 2021-01-13 ASSESSMENT — PAIN SCALES - GENERAL: PAINLEVEL: MODERATE PAIN (4)

## 2021-01-13 NOTE — PROGRESS NOTES
Infusion Nursing Note:  Parker Acevedo presents today for Feraheme.    Patient seen by provider today: No   present during visit today: Not Applicable.    Note: Spoke with RN from [facility] post Feraheme to report that pt had tolerated his Feraheme will, for the amount of time he was here post treatment.  Patient did not meet criteria for an asymptomatic covid-19 PCR test in infusion today as he has a procedure scheduled and already has a covid test ordered.     Intravenous Access:  Cm.    Treatment Conditions:  Not Applicable.      Post Infusion Assessment:  Patient tolerated infusion without incident.  Patient observed for 15 minutes post Feraheme but refused to stay 30 minutes per protocol stating that he needed to get to lab to leave a UA.  Pt did not return after his visit to lab.   Blood return noted pre and post infusion.  Site patent and intact, free from redness, edema or discomfort.  No evidence of extravasations.       Discharge Plan:   Discharge instructions reviewed with: Patient.  Patient discharged in stable condition accompanied by: wife.  Departure Mode: Ambulatory.    Yaa Daniels RN

## 2021-01-13 NOTE — PHARMACY-VANCOMYCIN DOSING SERVICE
Pharmacy Vancomycin Discharge Note    Current vancomycin 1250mg IV every 8 hours has led to therapeutic (goal 15-20) troughs.    1. Recommend continue 1250mg IV every 8 hours upon discharge.  2. Consider retiming doses to 0600, 1400, 2200 to avoid middle of the night dosing (current schedule 0200, 1000, 1800). Can achieve this by shifting doses up to 0800, 1600, 2400 for one day (6/7/18) then to goal schedule 0600, 1400, 2200 the following day (6/8/18).  3. Recommend repeat serum creatinine and trough level on 6/8. Timing of trough should be prior to the 1400 dose.      Carlita Hudson, WaiD     Well-controlled.  CPM.

## 2021-01-13 NOTE — PROGRESS NOTES
This is a recent snapshot of the patient's [facility] medical record.  For current drug dose and complete information and questions, call [phone] or In Basket pool, fv home infusion ([id number])  CSN Number:  [id number]

## 2021-01-13 NOTE — TELEPHONE ENCOUNTER
Prior Authorization Retail Medication Request    Medication/Dose: oxyCODONE (ROXICODONE) 5 MG/5ML solution    ICD code (if different than what is on RX):  Associated Diagnoses  Chronic abdominal pain [R10.9, G89.29]           Coverage information:     Subscriber: [id number] SARA HASSAN     Rel to sub: 01 - Self     Member ID: [id number]     Payor: 3-[facility] Ph: [phone]     Benefit plan: [facility] MEDICARE ADVANTAGE Ph: [phone]     Group number: [id number]     Member effective dates: from 01/01/19        [place], [place] - [address] [place] [address]  [address] [facility] [address]  Phone: [phone] Fax: [phone]

## 2021-01-14 ENCOUNTER — HOME INFUSION (PRE-WILLOW HOME INFUSION) (OUTPATIENT)
Dept: PHARMACY | Facility: CLINIC | Age: 51
End: 2021-01-14

## 2021-01-14 ENCOUNTER — PATIENT OUTREACH (OUTPATIENT)
Dept: FAMILY MEDICINE | Facility: CLINIC | Age: 51
End: 2021-01-14
Payer: COMMERCIAL

## 2021-01-14 NOTE — PROGRESS NOTES
This is a recent snapshot of the patient's [facility] medical record.  For current drug dose and complete information and questions, call [phone] or In [facility] pool, fv home infusion ([id number])  CSN Number:  [id number]

## 2021-01-14 NOTE — PROGRESS NOTES
Clinic Care Coordination Contact    Follow Up Progress Note      Assessment: Spoke with wife introduced self and role as I am now pt's new care coordinator.  Wife states he is doing okay he had an iron infusion yesterday.  She is frustrated however with surgery they have canceled the surgery a few times and have scheduled that tentatively again on 2/24.  States she does not want to get a preop and a Covid test if it got any counseled again.  Also states it needs to be done on a Saturday.  Informed wife someone will call and schedule his appt may be able to do this at [facility] or [facility].     Goals addressed this encounter:   Goals       #2 Medical (pt-stated)      Goal Statement: I want to prevent hospital visits  Date Goal set: 9/9/2020   Barriers: on chronic TPN, IV line  Strengths: home infusion, care coordination  Date to Achieve By: 3/9/21  Patient expressed understanding of goal: yes  Action steps to achieve this goal:  1. I will complete IV antibiotics as prescribed. (completed)  2. I will follow up with infectious disease provider as scheduled 9/24/20. (completed)  3. I will stay at home during COVID-19 pandemic.   4. I will monitor my temperature daily as instructed and contact [facility] for elevated temperature parameters.  5. I will have G-tube replaced as scheduled. (awaiting contact from surgery)        #3 Medical (pt-stated)      Goal Statement: I will schedule and attend an appointment with cardiology.  Date Goal set: 10/7/19  Barriers: COVID-19 pandemic, multiple specialists  Strengths: support from spouse  Date to Achieve By: 2/1/21  Patient expressed understanding of goal: yes  Action steps to achieve this goal:  1. I will schedule an appointment with cardiology  2. I will attend appointment with cardiology        Intervention/Education provided during outreach: Pt verbalizes understanding of follow up instructions and when scheduled appt date and times.     Outreach  Frequency: monthly    Plan:   Care Coordinator will follow up in one month    Dianelys KHAN, RN, PHN, CCM  Primary Clinic Care Coordination    [facility]  Primary Care Clinics  [email or web address]   Office: [phone]  Employed by [facility]

## 2021-01-14 NOTE — PROGRESS NOTES
This is a recent snapshot of the patient's [facility] medical record.  For current drug dose and complete information and questions, call [phone] or In Basket pool, fv home infusion ([id number])  CSN Number:  [id number]      
Detail Level: Zone

## 2021-01-15 ENCOUNTER — TELEPHONE (OUTPATIENT)
Dept: INTERNAL MEDICINE | Facility: CLINIC | Age: 51
End: 2021-01-15

## 2021-01-15 NOTE — TELEPHONE ENCOUNTER
I tried calling Myles, phone rang many times and then disconnected on its' own.................Sesar Shields LPN,   January 15, 2021,      1:15 PM,   [facility]

## 2021-01-15 NOTE — TELEPHONE ENCOUNTER
PA Initiation    Medication: oxyCODONE (ROXICODONE) 5 MG/5ML solution   Insurance Company: [employer] [place] - Phone [phone] Fax [phone]  Pharmacy Filling the Rx: [place], [place] - [address] [place] [address]  Filling Pharmacy Phone: [phone]  Filling Pharmacy Fax: [phone]  Start Date: 1/15/2021

## 2021-01-15 NOTE — TELEPHONE ENCOUNTER
Myles called back and informed her  Of what Dr. RUSSO stated.. She said okay she'll check with patient.

## 2021-01-15 NOTE — PROGRESS NOTES
This is a recent snapshot of the patient's [facility] medical record.  For current drug dose and complete information and questions, call [phone] or In Basket pool, fv home infusion ([id number])  CSN Number:  [id number]

## 2021-01-15 NOTE — TELEPHONE ENCOUNTER
Reason for Call:  Other Tracking equiptment    Detailed comments: Myles called and is trying to track equipment that was ordered August 20, 2020 and authorized January 20,2020. I could not find anything. Please call or email with information on this equiptment. [phone] ext [phone] / [email or web address]    Phone Number Patient can be reached at: Other phone number:  [phone] ext [phone]*    Best Time: As soon as possible    Can we leave a detailed message on this number? YES    Call taken on 1/15/2021 at 10:10 AM by Marci Rey

## 2021-01-18 LAB — COMPREHEN DRUG ANALYSIS UR: NORMAL

## 2021-01-18 NOTE — TELEPHONE ENCOUNTER
Prior Authorization Approval    Authorization Effective Date: 1/1/2021  Authorization Expiration Date: 1/16/2022  Medication: oxyCODONE (ROXICODONE) 5 MG/5ML solution--APPROVED  Approved Dose/Quantity:   Reference #:     Insurance Company: [employer] [place] - Phone [phone] Fax [phone]  Expected CoPay:       CoPay Card Available:      Foundation Assistance Needed:    Which Pharmacy is filling the prescription (Not needed for infusion/clinic administered): [facility] [place] - [address] [place] [address]  Pharmacy Notified: Yes  Patient Notified: Yes **Instructed pharmacy to notify patient when script is ready to /ship.**

## 2021-01-21 DIAGNOSIS — K31.1 GASTRIC OUTLET OBSTRUCTION: Primary | ICD-10-CM

## 2021-01-21 RX ORDER — CLINDAMYCIN PHOSPHATE 900 MG/50ML
900 INJECTION, SOLUTION INTRAVENOUS SEE ADMIN INSTRUCTIONS
Status: CANCELLED | OUTPATIENT
Start: 2021-01-21

## 2021-01-21 RX ORDER — CLINDAMYCIN PHOSPHATE 900 MG/50ML
900 INJECTION, SOLUTION INTRAVENOUS
Status: CANCELLED | OUTPATIENT
Start: 2021-01-21

## 2021-01-22 ENCOUNTER — MYC REFILL (OUTPATIENT)
Dept: FAMILY MEDICINE | Facility: CLINIC | Age: 51
End: 2021-01-22

## 2021-01-22 DIAGNOSIS — F90.0 ADHD (ATTENTION DEFICIT HYPERACTIVITY DISORDER), INATTENTIVE TYPE: ICD-10-CM

## 2021-01-22 NOTE — TELEPHONE ENCOUNTER
Adderall 20 mg      Last Written Prescription Date:  12/28/20  Last Fill Quantity: 30,   # refills: 0  Last Office Visit: 11-9-20- Pre-op  Future Office visit:    Next 5 appointments (look out 90 days)    Feb 22, 2021  1:00 PM  Office Visit with Chuy Isaac MD  [address] [address]  [phone]           Routing refill request to provider for review/approval because:  Drug not on the [facility], [facility] or [facility] refill protocol or controlled substance    Ativan 1 mg      Last Written Prescription Date:  12/28/20  Last Fill Quantity: 30,   # refills: 0  Last Office Visit: 11-9-20- Pre-op  Future Office visit:    Next 5 appointments (look out 90 days)    Feb 22, 2021  1:00 PM  Office Visit with Chuy Isaac MD  [address] [address]  [phone]           Routing refill request to provider for review/approval because:  Drug not on the [facility], [facility] or [facility] refill protocol or controlled substance

## 2021-01-26 ENCOUNTER — HOSPITAL ENCOUNTER (OUTPATIENT)
Dept: LAB | Facility: CLINIC | Age: 51
Discharge: HOME | End: 2021-01-26
Attending: SURGERY | Admitting: SURGERY
Payer: COMMERCIAL

## 2021-01-26 ENCOUNTER — MEDICAL CORRESPONDENCE (OUTPATIENT)
Dept: HEALTH INFORMATION MANAGEMENT | Facility: CLINIC | Age: 51
End: 2021-01-26

## 2021-01-26 PROBLEM — K31.1 GASTRIC OUTLET OBSTRUCTION: Status: ACTIVE | Noted: 2020-10-07

## 2021-01-26 LAB
ALBUMIN SERPL-MCNC: 3.2 G/DL (ref 3.4–5)
ALP SERPL-CCNC: 66 U/L (ref 40–150)
ALT SERPL W P-5'-P-CCNC: 30 U/L (ref 0–70)
ANION GAP SERPL CALCULATED.3IONS-SCNC: 4 MMOL/L (ref 3–14)
AST SERPL W P-5'-P-CCNC: 22 U/L (ref 0–45)
BASOPHILS # BLD AUTO: 0 10E9/L (ref 0–0.2)
BASOPHILS NFR BLD AUTO: 0.5 %
BILIRUB DIRECT SERPL-MCNC: <0.1 MG/DL (ref 0–0.2)
BILIRUB SERPL-MCNC: 0.3 MG/DL (ref 0.2–1.3)
BUN SERPL-MCNC: 8 MG/DL (ref 7–30)
CALCIUM SERPL-MCNC: 8.5 MG/DL (ref 8.5–10.1)
CHLORIDE SERPL-SCNC: 111 MMOL/L (ref 94–109)
CO2 SERPL-SCNC: 29 MMOL/L (ref 20–32)
CREAT SERPL-MCNC: 0.52 MG/DL (ref 0.66–1.25)
DIFFERENTIAL METHOD BLD: ABNORMAL
EOSINOPHIL NFR BLD AUTO: 1 %
ERYTHROCYTE [DISTWIDTH] IN BLOOD BY AUTOMATED COUNT: 19.5 % (ref 10–15)
GFR SERPL CREATININE-BSD FRML MDRD: >90 ML/MIN/{1.73_M2}
GLUCOSE SERPL-MCNC: 84 MG/DL (ref 70–99)
HCT VFR BLD AUTO: 35.4 % (ref 40–53)
HGB BLD-MCNC: 11.5 G/DL (ref 13.3–17.7)
IMM GRANULOCYTES # BLD: 0 10E9/L (ref 0–0.4)
IMM GRANULOCYTES NFR BLD: 0.3 %
LYMPHOCYTES # BLD AUTO: 1.6 10E9/L (ref 0.8–5.3)
LYMPHOCYTES NFR BLD AUTO: 26.4 %
MAGNESIUM SERPL-MCNC: 2.1 MG/DL (ref 1.6–2.3)
MCH RBC QN AUTO: 28.9 PG (ref 26.5–33)
MCHC RBC AUTO-ENTMCNC: 32.5 G/DL (ref 31.5–36.5)
MCV RBC AUTO: 89 FL (ref 78–100)
MONOCYTES # BLD AUTO: 0.6 10E9/L (ref 0–1.3)
MONOCYTES NFR BLD AUTO: 10.8 %
NEUTROPHILS # BLD AUTO: 3.6 10E9/L (ref 1.6–8.3)
NEUTROPHILS NFR BLD AUTO: 61 %
NRBC # BLD AUTO: 0 10*3/UL
NRBC BLD AUTO-RTO: 0 /100
PHOSPHATE SERPL-MCNC: 2.6 MG/DL (ref 2.5–4.5)
PLATELET # BLD AUTO: 211 10E9/L (ref 150–450)
POTASSIUM SERPL-SCNC: 3.4 MMOL/L (ref 3.4–5.3)
PROT SERPL-MCNC: 6.4 G/DL (ref 6.8–8.8)
RBC # BLD AUTO: 3.98 10E12/L (ref 4.4–5.9)
SODIUM SERPL-SCNC: 144 MMOL/L (ref 133–144)
TRIGL SERPL-MCNC: 155 MG/DL
WBC # BLD AUTO: 5.9 10E9/L (ref 4–11)

## 2021-01-26 PROCEDURE — 84100 ASSAY OF PHOSPHORUS: CPT | Performed by: SURGERY

## 2021-01-26 PROCEDURE — 80053 COMPREHEN METABOLIC PANEL: CPT | Performed by: SURGERY

## 2021-01-26 PROCEDURE — 85025 COMPLETE CBC W/AUTO DIFF WBC: CPT | Mod: GZ | Performed by: SURGERY

## 2021-01-26 PROCEDURE — 84478 ASSAY OF TRIGLYCERIDES: CPT | Performed by: SURGERY

## 2021-01-26 PROCEDURE — 83735 ASSAY OF MAGNESIUM: CPT | Performed by: SURGERY

## 2021-01-26 PROCEDURE — 82248 BILIRUBIN DIRECT: CPT | Performed by: SURGERY

## 2021-01-26 RX ORDER — LORAZEPAM 1 MG/1
TABLET ORAL
Qty: 30 TABLET | Refills: 0 | Status: ON HOLD | OUTPATIENT
Start: 2021-01-26 | End: 2021-02-24

## 2021-01-26 RX ORDER — DEXTROAMPHETAMINE SACCHARATE, AMPHETAMINE ASPARTATE, DEXTROAMPHETAMINE SULFATE AND AMPHETAMINE SULFATE 5; 5; 5; 5 MG/1; MG/1; MG/1; MG/1
TABLET ORAL
Qty: 30 TABLET | Refills: 0 | Status: SHIPPED | OUTPATIENT
Start: 2021-01-26 | End: 2021-03-01

## 2021-01-27 ENCOUNTER — HOME INFUSION (PRE-WILLOW HOME INFUSION) (OUTPATIENT)
Dept: PHARMACY | Facility: CLINIC | Age: 51
End: 2021-01-27

## 2021-01-28 ENCOUNTER — HOME INFUSION (PRE-WILLOW HOME INFUSION) (OUTPATIENT)
Dept: PHARMACY | Facility: CLINIC | Age: 51
End: 2021-01-28

## 2021-01-28 ENCOUNTER — TELEPHONE (OUTPATIENT)
Dept: INFECTIOUS DISEASES | Facility: CLINIC | Age: 51
End: 2021-01-28

## 2021-01-28 LAB
BASOPHILS # BLD AUTO: 0.1 10E9/L (ref 0–0.2)
BASOPHILS NFR BLD AUTO: 0.9 %
DIFFERENTIAL METHOD BLD: ABNORMAL
EOSINOPHIL # BLD AUTO: 0.1 10E9/L (ref 0–0.7)
EOSINOPHIL NFR BLD AUTO: 1.3 %
ERYTHROCYTE [DISTWIDTH] IN BLOOD BY AUTOMATED COUNT: 20.6 % (ref 10–15)
HCT VFR BLD AUTO: 38.6 % (ref 40–53)
HGB BLD-MCNC: 12.2 G/DL (ref 13.3–17.7)
IMM GRANULOCYTES # BLD: 0 10E9/L (ref 0–0.4)
IMM GRANULOCYTES NFR BLD: 0.4 %
LYMPHOCYTES # BLD AUTO: 2.2 10E9/L (ref 0.8–5.3)
LYMPHOCYTES NFR BLD AUTO: 28.6 %
MCH RBC QN AUTO: 28.4 PG (ref 26.5–33)
MCHC RBC AUTO-ENTMCNC: 31.6 G/DL (ref 31.5–36.5)
MCV RBC AUTO: 90 FL (ref 78–100)
MONOCYTES # BLD AUTO: 0.9 10E9/L (ref 0–1.3)
MONOCYTES NFR BLD AUTO: 11.1 %
NEUTROPHILS # BLD AUTO: 4.4 10E9/L (ref 1.6–8.3)
NEUTROPHILS NFR BLD AUTO: 57.7 %
NRBC # BLD AUTO: 0 10*3/UL
NRBC BLD AUTO-RTO: 0 /100
PLATELET # BLD AUTO: 244 10E9/L (ref 150–450)
RBC # BLD AUTO: 4.29 10E12/L (ref 4.4–5.9)
WBC # BLD AUTO: 7.7 10E9/L (ref 4–11)

## 2021-01-28 PROCEDURE — 85025 COMPLETE CBC W/AUTO DIFF WBC: CPT | Performed by: PEDIATRICS

## 2021-01-28 PROCEDURE — 87186 SC STD MICRODIL/AGAR DIL: CPT | Performed by: PEDIATRICS

## 2021-01-28 PROCEDURE — 87800 DETECT AGNT MULT DNA DIREC: CPT | Performed by: PEDIATRICS

## 2021-01-28 PROCEDURE — 87077 CULTURE AEROBIC IDENTIFY: CPT | Performed by: PEDIATRICS

## 2021-01-28 PROCEDURE — 87181 SC STD AGAR DILUTION PER AGT: CPT | Mod: GZ | Performed by: PEDIATRICS

## 2021-01-28 PROCEDURE — 87040 BLOOD CULTURE FOR BACTERIA: CPT | Performed by: PEDIATRICS

## 2021-01-28 PROCEDURE — 87181 SC STD AGAR DILUTION PER AGT: CPT | Performed by: PEDIATRICS

## 2021-01-28 NOTE — TELEPHONE ENCOUNTER
[facility] Call Center    Phone Message    May a detailed message be left on voicemail: yes     Reason for Call:     Pt's spouse Rose, requesting an order for blood cultures, states pt's fever going up to 101.     Action Taken: Message routed to:  [facility] id    Travel Screening: Not Applicable

## 2021-01-28 NOTE — TELEPHONE ENCOUNTER
Requested blood cultures x2 be drawn today via [facility] per Dr. Buckner.  Astrid Saucedo RN

## 2021-01-28 NOTE — PROGRESS NOTES
This is a recent snapshot of the patient's [facility] medical record.  For current drug dose and complete information and questions, call [phone] or In Basket pool, fv home infusion ([id number])  CSN Number:  [id number]

## 2021-01-28 NOTE — TELEPHONE ENCOUNTER
I sending stat blood culture + CBC orders to home care agency to be drawn today.  Astrid Saucedo RN     [facility]mtrc tc/cma

## 2021-01-29 ENCOUNTER — VIRTUAL VISIT (OUTPATIENT)
Dept: PALLIATIVE MEDICINE | Facility: CLINIC | Age: 51
End: 2021-01-29
Payer: COMMERCIAL

## 2021-01-29 ENCOUNTER — TELEPHONE (OUTPATIENT)
Dept: INFECTIOUS DISEASES | Facility: CLINIC | Age: 51
End: 2021-01-29

## 2021-01-29 ENCOUNTER — HOSPITAL ENCOUNTER (INPATIENT)
Facility: CLINIC | Age: 51
LOS: 6 days | Discharge: HOME HEALTH | DRG: 315 | End: 2021-02-05
Attending: INTERNAL MEDICINE | Admitting: INTERNAL MEDICINE
Payer: COMMERCIAL

## 2021-01-29 ENCOUNTER — MYC REFILL (OUTPATIENT)
Dept: PALLIATIVE MEDICINE | Facility: CLINIC | Age: 51
End: 2021-01-29

## 2021-01-29 DIAGNOSIS — G89.29 CHRONIC ABDOMINAL PAIN: ICD-10-CM

## 2021-01-29 DIAGNOSIS — G89.4 CHRONIC PAIN SYNDROME: ICD-10-CM

## 2021-01-29 DIAGNOSIS — K90.829 SHORT BOWEL SYNDROME: ICD-10-CM

## 2021-01-29 DIAGNOSIS — R78.81 BACTEREMIA: ICD-10-CM
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
## 2021-01-29 DIAGNOSIS — Z98.1 STATUS POST CERVICAL SPINAL ARTHRODESIS: ICD-10-CM

## 2021-01-29 DIAGNOSIS — Z11.52 ENCOUNTER FOR SCREENING LABORATORY TESTING FOR SEVERE ACUTE RESPIRATORY SYNDROME CORONAVIRUS 2 (SARS-COV-2): ICD-10-CM

## 2021-01-29 DIAGNOSIS — Z78.9 ON TOTAL PARENTERAL NUTRITION: ICD-10-CM

## 2021-01-29 DIAGNOSIS — R10.9 CHRONIC ABDOMINAL PAIN: ICD-10-CM

## 2021-01-29 DIAGNOSIS — R78.81 POSITIVE BLOOD CULTURE: ICD-10-CM

## 2021-01-29 DIAGNOSIS — Z78.9 CONDITION INFLUENCING HEALTH STATUS: ICD-10-CM

## 2021-01-29 DIAGNOSIS — K90.9 IDIOPATHIC STEATORRHEA: ICD-10-CM

## 2021-01-29 DIAGNOSIS — T80.211A BLOODSTREAM INFECTION ASSOCIATED WITH CENTRAL VENOUS CATHETER, INITIAL ENCOUNTER: ICD-10-CM

## 2021-01-29 DIAGNOSIS — R10.9 CHRONIC ABDOMINAL PAIN: Primary | ICD-10-CM

## 2021-01-29 DIAGNOSIS — K90.829 SHORT GUT SYNDROME: Primary | ICD-10-CM

## 2021-01-29 DIAGNOSIS — Z53.9 ERRONEOUS ENCOUNTER--DISREGARD: Primary | ICD-10-CM

## 2021-01-29 DIAGNOSIS — G89.29 CHRONIC ABDOMINAL PAIN: Primary | ICD-10-CM

## 2021-01-29 DIAGNOSIS — T80.211A CATHETER-RELATED BLOODSTREAM INFECTION, INITIAL ENCOUNTER: ICD-10-CM

## 2021-01-29 DIAGNOSIS — E44.0 MODERATE PROTEIN-CALORIE MALNUTRITION (H): ICD-10-CM

## 2021-01-29 LAB
ALBUMIN SERPL-MCNC: 3.4 G/DL (ref 3.4–5)
ALP SERPL-CCNC: 69 U/L (ref 40–150)
ALT SERPL W P-5'-P-CCNC: 40 U/L (ref 0–70)
ANION GAP SERPL CALCULATED.3IONS-SCNC: 5 MMOL/L (ref 3–14)
AST SERPL W P-5'-P-CCNC: 19 U/L (ref 0–45)
BILIRUB SERPL-MCNC: 1 MG/DL (ref 0.2–1.3)
BUN SERPL-MCNC: 14 MG/DL (ref 7–30)
CALCIUM SERPL-MCNC: 8.8 MG/DL (ref 8.5–10.1)
CHLORIDE SERPL-SCNC: 108 MMOL/L (ref 94–109)
CO2 SERPL-SCNC: 28 MMOL/L (ref 20–32)
CREAT SERPL-MCNC: 0.67 MG/DL (ref 0.66–1.25)
CRP SERPL-MCNC: <2.9 MG/L (ref 0–8)
GFR SERPL CREATININE-BSD FRML MDRD: >90 ML/MIN/{1.73_M2}
GLUCOSE SERPL-MCNC: 96 MG/DL (ref 70–99)
INR PPP: 1.04 (ref 0.86–1.14)
POTASSIUM SERPL-SCNC: 3.8 MMOL/L (ref 3.4–5.3)
PROT SERPL-MCNC: 6.7 G/DL (ref 6.8–8.8)
SODIUM SERPL-SCNC: 140 MMOL/L (ref 133–144)

## 2021-01-29 PROCEDURE — 87181 SC STD AGAR DILUTION PER AGT: CPT | Performed by: INTERNAL MEDICINE

## 2021-01-29 PROCEDURE — 87800 DETECT AGNT MULT DNA DIREC: CPT | Performed by: INTERNAL MEDICINE

## 2021-01-29 PROCEDURE — 99214 OFFICE O/P EST MOD 30 MIN: CPT | Mod: 95 | Performed by: PSYCHIATRY & NEUROLOGY

## 2021-01-29 PROCEDURE — 87040 BLOOD CULTURE FOR BACTERIA: CPT | Performed by: INTERNAL MEDICINE

## 2021-01-29 PROCEDURE — 87077 CULTURE AEROBIC IDENTIFY: CPT | Performed by: INTERNAL MEDICINE

## 2021-01-29 PROCEDURE — 80053 COMPREHEN METABOLIC PANEL: CPT | Performed by: INTERNAL MEDICINE

## 2021-01-29 PROCEDURE — C9803 HOPD COVID-19 SPEC COLLECT: HCPCS | Performed by: INTERNAL MEDICINE

## 2021-01-29 PROCEDURE — 99285 EMERGENCY DEPT VISIT HI MDM: CPT | Performed by: INTERNAL MEDICINE

## 2021-01-29 PROCEDURE — 87186 SC STD MICRODIL/AGAR DIL: CPT | Performed by: INTERNAL MEDICINE

## 2021-01-29 PROCEDURE — 250N000011 HC RX IP 250 OP 636: Performed by: INTERNAL MEDICINE

## 2021-01-29 PROCEDURE — 85025 COMPLETE CBC W/AUTO DIFF WBC: CPT | Performed by: INTERNAL MEDICINE

## 2021-01-29 PROCEDURE — 99285 EMERGENCY DEPT VISIT HI MDM: CPT | Mod: 25 | Performed by: INTERNAL MEDICINE

## 2021-01-29 PROCEDURE — 250N000013 HC RX MED GY IP 250 OP 250 PS 637: Performed by: INTERNAL MEDICINE

## 2021-01-29 PROCEDURE — 86140 C-REACTIVE PROTEIN: CPT | Performed by: INTERNAL MEDICINE

## 2021-01-29 PROCEDURE — 96365 THER/PROPH/DIAG IV INF INIT: CPT | Performed by: INTERNAL MEDICINE

## 2021-01-29 PROCEDURE — 85610 PROTHROMBIN TIME: CPT | Performed by: INTERNAL MEDICINE

## 2021-01-29 RX ORDER — OXYCODONE HCL 5 MG/5 ML
5-10 SOLUTION, ORAL ORAL 3 TIMES DAILY PRN
Qty: 900 ML | Refills: 0 | Status: ON HOLD | OUTPATIENT
Start: 2021-01-29 | End: 2021-02-05

## 2021-01-29 RX ORDER — CEFTRIAXONE 1 G/1
1 INJECTION, POWDER, FOR SOLUTION INTRAMUSCULAR; INTRAVENOUS ONCE
Status: DISCONTINUED | OUTPATIENT
Start: 2021-01-29 | End: 2021-01-29

## 2021-01-29 RX ORDER — DIPHENHYDRAMINE HCL 25 MG
25 CAPSULE ORAL EVERY 12 HOURS
Status: DISCONTINUED | OUTPATIENT
Start: 2021-01-30 | End: 2021-01-30

## 2021-01-29 RX ORDER — FENTANYL 25 UG/1
1 PATCH TRANSDERMAL
Qty: 15 PATCH | Refills: 0 | Status: ON HOLD | OUTPATIENT
Start: 2021-01-29 | End: 2021-02-05

## 2021-01-29 RX ORDER — CEFTRIAXONE 2 G/1
2 INJECTION, POWDER, FOR SOLUTION INTRAMUSCULAR; INTRAVENOUS ONCE
Status: COMPLETED | OUTPATIENT
Start: 2021-01-29 | End: 2021-01-30

## 2021-01-29 RX ORDER — DIPHENHYDRAMINE HCL 25 MG
25 CAPSULE ORAL ONCE
Status: DISCONTINUED | OUTPATIENT
Start: 2021-01-29 | End: 2021-01-29

## 2021-01-29 RX ORDER — DIPHENHYDRAMINE HYDROCHLORIDE 50 MG/ML
25 INJECTION INTRAMUSCULAR; INTRAVENOUS ONCE
Status: COMPLETED | OUTPATIENT
Start: 2021-01-29 | End: 2021-01-29

## 2021-01-29 RX ORDER — OXYCODONE HCL 5 MG/5 ML
10 SOLUTION, ORAL ORAL ONCE
Status: COMPLETED | OUTPATIENT
Start: 2021-01-29 | End: 2021-01-29

## 2021-01-29 RX ADMIN — OXYCODONE HYDROCHLORIDE 10 MG: 5 SOLUTION ORAL at 23:35

## 2021-01-29 RX ADMIN — CEFTRIAXONE SODIUM 2 G: 2 INJECTION, POWDER, FOR SOLUTION INTRAMUSCULAR; INTRAVENOUS at 23:35

## 2021-01-29 RX ADMIN — DIPHENHYDRAMINE HYDROCHLORIDE 25 MG: 50 INJECTION, SOLUTION INTRAMUSCULAR; INTRAVENOUS at 23:35

## 2021-01-29 ASSESSMENT — ENCOUNTER SYMPTOMS
VOMITING: 0
DIFFICULTY URINATING: 0
NUMBNESS: 0
WEAKNESS: 0
FEVER: 1
NECK PAIN: 0
HEADACHES: 0
BACK PAIN: 0
ADENOPATHY: 0
NAUSEA: 0
DIAPHORESIS: 1
CHILLS: 1
ABDOMINAL PAIN: 0
TROUBLE SWALLOWING: 0
WHEEZING: 0
LIGHT-HEADEDNESS: 0
COUGH: 0
SHORTNESS OF BREATH: 0
CONFUSION: 0

## 2021-01-29 ASSESSMENT — PAIN SCALES - GENERAL: PAINLEVEL: MODERATE PAIN (5)

## 2021-01-29 NOTE — CONFIDENTIAL NOTE
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 He contacted the clinic earlier this week to report fevers and chills x approximately 1 week. We arranged for outpatient blood cultures and 1/2 blood cultures (unclear if from periphery or line) is positive for GPCs in pairs and chains. Given presence of symptoms and positive blood cultures, I recommended that he proceed to the ED for evaluation and he plans to do so.     Recommendations:   1. Please check blood cultures from peripheral draw and from line prior to starting any antibiotics  2. Start vancomycin and ceftriaxone 2g IV Q24H pending culture results. Listed allergy to penicillins but has tolerated ceftriaxone.   3. IF patient requires current line to be removed, home care has been advocating for future line to be one that would tolerate an ethanol lock to decrease future instances of bacteremia.     Марина Buckner MD  Infectious Diseases

## 2021-01-29 NOTE — TELEPHONE ENCOUNTER
Medication refill information reviewed.     Due date for      fentaNYL (DURAGESIC) 25 mcg/hr 72 hr patch      oxyCODONE (ROXICODONE) 5 MG/5ML solution  is 02/07/21     Prescriptions prepped for review.     Will route to provider.

## 2021-01-29 NOTE — TELEPHONE ENCOUNTER
Refills have been requested for the following medications:         fentaNYL (DURAGESIC) 25 mcg/hr 72 hr patch [Patti Milligan MD]         oxyCODONE (ROXICODONE) 5 MG/5ML solution [Patti Milligan MD]     Preferred pharmacy: [address]

## 2021-01-29 NOTE — PROGRESS NOTES
Parker is a 50 year old who is being evaluated via a billable video visit.      How would you like to obtain your AVS? Chris  If the video visit is dropped, the invitation should be resent by: Other e-mail: Chris  Will anyone else be joining your video visit? Yes: Rose (spouse). How would they like to receive their invitation? Other e-mail: Chris Banks MA  [facility]        Video Start Time: 1131  Video-Visit Details    Type of service:  Video Visit    Video End Time:1145    Originating Location (pt. Location): Home    Distant Location (provider location):  [facility] [employer]     Platform used for Video Visit: Teach 'n Go                                    [facility]    Date of visit: 1/29/2021     Chief complaint:    Chief Complaint   Patient presents with     Pain     Video visit due covid-19       Interval history:  Parker Acevedo was last seen by me on 11/6/2020     Recommendations/plan at the last visit included:  1. Physical therapy- not at this time with COVID  2. He will get UDS done at clinic.  3. Medications: agreed to increase to 30mg/day of the oxycodone.  4. Follow up in 3 months- will determine virtual vs in person based on how COVID numbers are        Since his last visit, Parker Acevedo reports:  -he had COVID- was able to stay home.  -he has having a replacement of his drain.  Been getting sick  -no other issues. Ankles/feet sore at times.      Pain scores:  Average pain intensity  5/10    Current pain treatments:   Fentanyl 25mcg/hr patch q48 hours -  Previously was at 50mcg/hr  Lidocaine patches- as needed  Naloxone- has from previous hospitalization.  Oxycodone max of 30mg a day.- increased during COVID    Previous medication treatments included:   Valium 5 mg/day, 1 tab in AM and PM-stopped in 11/2017  Tylenol #3   Vicodin   Tramadol   Diazepam- for sleep/anxiety  Gabapentin- bad headaches, acid  "reflux  Oxycodone max of 45mg/day.        Side Effects: no side effects    Medications:  Current Outpatient Medications   Medication Sig Dispense Refill     acetaminophen (TYLENOL) 32 mg/mL liquid Take 15.65 mLs (500 mg) by mouth every 4 hours as needed for fever or mild pain 455 mL 1     alteplase 2 mg/2 mL (in 10 mL syringe) Inject 1 mg into the vein as needed       amphetamine-dextroamphetamine (ADDERALL) 20 MG tablet TAKE ONE TABLET BY MOUTH ONCE DAILY 30 tablet 0     carvedilol (COREG) 6.25 MG tablet Take 6.25 mg by mouth 2 times daily (with meals)       cyanocobalamin (CYANOCOBALAMIN) 1000 MCG/ML injection Inject 1 mL (1,000 mcg) into the muscle every 30 days 1 mL 11     diphenhydrAMINE (BENADRYL) 25 MG capsule Take 1 capsule (25 mg) by mouth every 8 hours 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  Phones are answered 24 hours a day 7 days a week 365 days a year.    Providers - Choose \"CONTINUE HOME MED (no script)\" at discharge if patient treatment with home infusion will continue.       fentaNYL (DURAGESIC) 25 mcg/hr 72 hr patch Place 1 patch onto the skin every 48 hours remove old patch.   May fill 1/6/20 and start 1/8/20 15 patch 0     Lidocaine (LIDOCARE) 4 % Patch Place 1 patch onto the skin every 24 hours To prevent lidocaine toxicity, patient should be patch free for 12 hrs daily. 30 patch 0     lidocaine 7.5 mL, alum & mag hydroxide-simethicone 7.5 mL GI Cocktail Take 15 mLs by mouth once as needed for mouth, throat or stomach pain 1000 mL 1     lidocaine, alum & mag hydroxide-simethicone GI Cocktail 30 ml daily as needed for mouth/stomach pain. 1 Bottle 1     LORazepam (ATIVAN) 1 MG tablet TAKE 1 TABLET (1MG) BY MOUTH AS NEEDED FOR ANXIETY WITH TPN AND MEDICATIONS . JUST ONCE A DAY (30 TO " LAST 30 DAYS) 30 tablet 0     naloxone (NARCAN) 4 MG/0.1ML nasal spray Spray 1 spray (4 mg) into one nostril alternating nostrils as needed for opioid reversal every 2-3 minutes until assistance arrives 0.2 mL 0     nicotine (COMMIT) 2 MG lozenge Place 1 lozenge (2 mg) inside cheek every hour as needed for smoking cessation 30 lozenge 0     nicotine (NICODERM CQ) 21 MG/24HR 24 hr patch Place 1 patch onto the skin every 24 hours 30 patch 0     nystatin (MYCOSTATIN) 849676 UNIT/GM external cream Apply topically 2 times daily 30 g 0     ondansetron (ZOFRAN-ODT) 8 MG ODT tab DISSOLVE ONE TABLET ON TONGUE EVERY 8 HOURS AS NEEDED FOR NAUSEA 90 tablet 1     oxyCODONE (ROXICODONE) 5 MG/5ML solution Take 5-10 mLs (5-10 mg) by mouth 3 times daily as needed for pain Max of 30mg/day. Fill 1/8/20 to start on/after 1/10/20. 30 day supply. 900 mL 0     pantoprazole (PROTONIX) 40 MG EC tablet Take 1 tablet (40 mg) by mouth daily 30 tablet 5     parenteral nutrition - PTA/DISCHARGE ORDER Patient receives 2050 mL TPN every 14 hours through PICC at [facility].       polyethylene glycol (MIRALAX) 17 g packet Take 17 g by mouth daily 72 packet 6     sucralfate (CARAFATE) 1 GM/10ML suspension Take 1 g by mouth 4 times daily as needed       VENTOLIN  (90 Base) MCG/ACT inhaler INHALE TWO PUFFS BY MOUTH EVERY 4 HOURS AS NEEDED FOR SHORTNESS OF BREATH /DYSPNEA OR WHEEZING 1 Inhaler 1     vitamin D2 (ERGOCALCIFEROL) 87205 units (1250 mcg) capsule Take 1 capsule (50,000 Units) by mouth once a week 12 capsule 3       Medical History: any changes in medical history since they were last seen? No    Review of Systems:  The 14 system ROS was reviewed from the intake questionnaire, and is positive for bolded:   Constitutional: fever/chills, fatigue, weight gain, weight loss, infection  Eyes/Head: headache, dizziness  ENT: ringing in ears  Allergy/Immune: allergies  Skin: itching, rash, hives  Hematologic: easy  bruising  Respiratory: cough, wheezing, shortness of breath  Cardiovascular: swelling in feet, fainting, palpitations, chest pain  GI: abdominal pain, nausea, vomiting, diarrhea, constipation  Endocrine: steroid use  Musculoskeletal:  joint pain, arthritis, stiffness, gout, back pain, neck pain  Urinary: frequency, urgency, incontinence, hesitancy  Neurologic: weakness, numbness/tingling, seizure, stroke, memory loss  Mental health: depression, anxiety, stress, suicidal ideation      THE 4 A's OF OPIOID MAINTENANCE ANALGESIA     Analgesia: adequate    Activity: on disability    Adverse effects: none    Adherence to Rx protocol: good- [place] Prescription Monitoring Program checked 11/6/20 appropriate    Last UDS and opioid agreement - 1/5/21      Assessment:   1. Chronic abdominal pain, with history of gastric bypass and later peptic ulcer   2. G tube placement- only used for venting  3. Myofascial abdominal pain, with significant guarding and poor posture  4. Opioid tolerance  5. Anxiety  6. Foot pain with tendonous injury- healed  7. Left arm weakness, consistent with radial nerve injury- improving  8. Port placement- h/o recurrent infections, getting TPN    Plan:   1. Physical therapy- not at this time with COVID  2. Medications: agreed to increase to 30mg/day of the oxycodone.  3. Follow up in 3 months-  in person    Jessica Milligan MD  [facility]

## 2021-01-29 NOTE — TELEPHONE ENCOUNTER
Pt aware, we discussed at his appt.    Signed Prescriptions:                        Disp   Refills    fentaNYL (DURAGESIC) 25 mcg/hr 72 hr patch 15 pat*0        Sig: Place 1 patch onto the skin every 48 hours remove old           patch.   May fill 02/05/21 and start 02/07/21  Authorizing Provider: BRANDYN JENKINS    oxyCODONE (ROXICODONE) 5 MG/5ML solution   900 mL 0        Sig: Take 5-10 mLs (5-10 mg) by mouth 3 times daily as           needed for pain Max of 30mg/day. Fill 02/06/21 to           start on/after 02/09/21. 30 day supply.  Authorizing Provider: BRANDYN JENKINS MD  [facility]

## 2021-01-29 NOTE — TELEPHONE ENCOUNTER
Patient requesting refill(s) of fentaNYL (DURAGESIC) 25 mcg/hr 72 hr patch   Last dispensed from pharmacy on 1/6/21    oxyCODONE (ROXICODONE) 5 MG/5ML solution   Last dispensed from pharmacy on 1/8/21    Patient's last office/virtual visit by prescribing provider on 1/29/21  Next office/virtual appointment scheduled for None     Last urine drug screen date 11/06/2020  Current opioid agreement on file (completed within the last year) Yes Date of opioid agreement: 01/05/21    E-prescribe to [facility] - [place], [place] pharmacy    Will route to nursing Buckley for review and preparation of prescription(s).

## 2021-01-29 NOTE — PATIENT INSTRUCTIONS
1. Schedule follow up in person in 3 months.    ----------------------------------------------------------------  Clinic Number:  [phone]     Call with any questions about your care and for scheduling assistance.     Calls are returned Monday through Friday between 8 AM and 4:30 PM. We usually get back to you within 2 business days depending on the issue/request.    If we are prescribing your medications:    For opioid medication refills, call the clinic or send a Vestiage message 7 days in advance.  Please include:    Name of requested medication    Name of the pharmacy.    For non-opioid medications, call your pharmacy directly to request a refill. Please allow 3-4 days to be processed.     Per [place] State Law:    All controlled substance prescriptions must be filled within 30 days of being written.      For those controlled substances allowing refills, pickup must occur within 30 days of last fill.      We believe regular attendance is key to your success in our program!      Any time you are unable to keep your appointment we ask that you call us at least 24 hours in advance to cancel.This will allow us to offer the appointment time to another patient.     Multiple missed appointments may lead to dismissal from the clinic.

## 2021-01-29 NOTE — PROGRESS NOTES
This is a recent snapshot of the patient's [facility] medical record.  For current drug dose and complete information and questions, call [phone] or In Basket pool, [facility] home infusion ([id number])  CSN Number:  [id number]

## 2021-01-30 PROBLEM — Z78.9 ON TOTAL PARENTERAL NUTRITION: Status: ACTIVE | Noted: 2019-09-29

## 2021-01-30 PROBLEM — K90.829 SHORT BOWEL SYNDROME: Status: ACTIVE | Noted: 2021-01-30

## 2021-01-30 PROBLEM — T80.211A: Status: ACTIVE | Noted: 2021-01-30

## 2021-01-30 LAB
BASOPHILS # BLD AUTO: 0 10E9/L (ref 0–0.2)
BASOPHILS NFR BLD AUTO: 0.4 %
DIFFERENTIAL METHOD BLD: ABNORMAL
EOSINOPHIL # BLD AUTO: 0.1 10E9/L (ref 0–0.7)
EOSINOPHIL NFR BLD AUTO: 1 %
ERYTHROCYTE [DISTWIDTH] IN BLOOD BY AUTOMATED COUNT: 20.9 % (ref 10–15)
ERYTHROCYTE [DISTWIDTH] IN BLOOD BY AUTOMATED COUNT: 21.1 % (ref 10–15)
FLUAV RNA RESP QL NAA+PROBE: NEGATIVE
FLUBV RNA RESP QL NAA+PROBE: NEGATIVE
HCT VFR BLD AUTO: 37.4 % (ref 40–53)
HCT VFR BLD AUTO: 39.1 % (ref 40–53)
HGB BLD-MCNC: 12.1 G/DL (ref 13.3–17.7)
HGB BLD-MCNC: 12.4 G/DL (ref 13.3–17.7)
IMM GRANULOCYTES # BLD: 0 10E9/L (ref 0–0.4)
IMM GRANULOCYTES NFR BLD: 0.3 %
LABORATORY COMMENT REPORT: NORMAL
LYMPHOCYTES # BLD AUTO: 2.5 10E9/L (ref 0.8–5.3)
LYMPHOCYTES NFR BLD AUTO: 26.2 %
MCH RBC QN AUTO: 28.6 PG (ref 26.5–33)
MCH RBC QN AUTO: 28.8 PG (ref 26.5–33)
MCHC RBC AUTO-ENTMCNC: 31.7 G/DL (ref 31.5–36.5)
MCHC RBC AUTO-ENTMCNC: 32.4 G/DL (ref 31.5–36.5)
MCV RBC AUTO: 89 FL (ref 78–100)
MCV RBC AUTO: 90 FL (ref 78–100)
MONOCYTES # BLD AUTO: 1.1 10E9/L (ref 0–1.3)
MONOCYTES NFR BLD AUTO: 11.3 %
NEUTROPHILS # BLD AUTO: 5.7 10E9/L (ref 1.6–8.3)
NEUTROPHILS NFR BLD AUTO: 60.8 %
NRBC # BLD AUTO: 0 10*3/UL
NRBC BLD AUTO-RTO: 0 /100
PLATELET # BLD AUTO: 239 10E9/L (ref 150–450)
PLATELET # BLD AUTO: 243 10E9/L (ref 150–450)
RBC # BLD AUTO: 4.2 10E12/L (ref 4.4–5.9)
RBC # BLD AUTO: 4.34 10E12/L (ref 4.4–5.9)
RSV RNA SPEC QL NAA+PROBE: NEGATIVE
SARS-COV-2 RNA RESP QL NAA+PROBE: NEGATIVE
SPECIMEN SOURCE: NORMAL
WBC # BLD AUTO: 8.9 10E9/L (ref 4–11)
WBC # BLD AUTO: 9.4 10E9/L (ref 4–11)

## 2021-01-30 PROCEDURE — 250N000013 HC RX MED GY IP 250 OP 250 PS 637: Performed by: EMERGENCY MEDICINE

## 2021-01-30 PROCEDURE — 250N000009 HC RX 250: Performed by: INTERNAL MEDICINE

## 2021-01-30 PROCEDURE — 250N000011 HC RX IP 250 OP 636: Performed by: HOSPITALIST

## 2021-01-30 PROCEDURE — 96375 TX/PRO/DX INJ NEW DRUG ADDON: CPT

## 2021-01-30 PROCEDURE — 99233 SBSQ HOSP IP/OBS HIGH 50: CPT | Mod: GC | Performed by: INTERNAL MEDICINE

## 2021-01-30 PROCEDURE — 3E04328 INTRODUCTION OF OXAZOLIDINONES INTO CENTRAL VEIN, PERCUTANEOUS APPROACH: ICD-10-PCS | Performed by: INTERNAL MEDICINE

## 2021-01-30 PROCEDURE — 99207 PR CDG-HISTORY COMP: MEETS EXP. PROBLEM FOCUSED - DOWN CODED LACK OF HPI: CPT | Performed by: INTERNAL MEDICINE

## 2021-01-30 PROCEDURE — 120N000002 HC R&B MED SURG/OB UMMC

## 2021-01-30 PROCEDURE — 258N000003 HC RX IP 258 OP 636: Performed by: INTERNAL MEDICINE

## 2021-01-30 PROCEDURE — 250N000011 HC RX IP 250 OP 636: Performed by: NURSE PRACTITIONER

## 2021-01-30 PROCEDURE — 250N000011 HC RX IP 250 OP 636: Performed by: INTERNAL MEDICINE

## 2021-01-30 PROCEDURE — 250N000013 HC RX MED GY IP 250 OP 250 PS 637: Performed by: INTERNAL MEDICINE

## 2021-01-30 PROCEDURE — 250N000011 HC RX IP 250 OP 636: Performed by: STUDENT IN AN ORGANIZED HEALTH CARE EDUCATION/TRAINING PROGRAM

## 2021-01-30 PROCEDURE — 96367 TX/PROPH/DG ADDL SEQ IV INF: CPT

## 2021-01-30 PROCEDURE — 96366 THER/PROPH/DIAG IV INF ADDON: CPT

## 2021-01-30 PROCEDURE — 87040 BLOOD CULTURE FOR BACTERIA: CPT | Performed by: STUDENT IN AN ORGANIZED HEALTH CARE EDUCATION/TRAINING PROGRAM

## 2021-01-30 PROCEDURE — 258N000003 HC RX IP 258 OP 636: Performed by: HOSPITALIST

## 2021-01-30 PROCEDURE — 85027 COMPLETE CBC AUTOMATED: CPT | Performed by: STUDENT IN AN ORGANIZED HEALTH CARE EDUCATION/TRAINING PROGRAM

## 2021-01-30 PROCEDURE — 87636 SARSCOV2 & INF A&B AMP PRB: CPT | Performed by: INTERNAL MEDICINE

## 2021-01-30 PROCEDURE — 250N000013 HC RX MED GY IP 250 OP 250 PS 637: Performed by: STUDENT IN AN ORGANIZED HEALTH CARE EDUCATION/TRAINING PROGRAM

## 2021-01-30 PROCEDURE — 36415 COLL VENOUS BLD VENIPUNCTURE: CPT | Performed by: STUDENT IN AN ORGANIZED HEALTH CARE EDUCATION/TRAINING PROGRAM

## 2021-01-30 PROCEDURE — 250N000011 HC RX IP 250 OP 636: Performed by: EMERGENCY MEDICINE

## 2021-01-30 RX ORDER — ALBUTEROL SULFATE 90 UG/1
2 AEROSOL, METERED RESPIRATORY (INHALATION) EVERY 4 HOURS PRN
Status: DISCONTINUED | OUTPATIENT
Start: 2021-01-30 | End: 2021-02-05 | Stop reason: HOSPADM

## 2021-01-30 RX ORDER — NALOXONE HYDROCHLORIDE 0.4 MG/ML
0.2 INJECTION, SOLUTION INTRAMUSCULAR; INTRAVENOUS; SUBCUTANEOUS
Status: DISCONTINUED | OUTPATIENT
Start: 2021-01-30 | End: 2021-02-05 | Stop reason: HOSPADM

## 2021-01-30 RX ORDER — OXYCODONE HCL 5 MG/5 ML
5-10 SOLUTION, ORAL ORAL EVERY 4 HOURS PRN
Status: DISCONTINUED | OUTPATIENT
Start: 2021-01-30 | End: 2021-02-05 | Stop reason: HOSPADM

## 2021-01-30 RX ORDER — CARVEDILOL 6.25 MG/1
6.25 TABLET ORAL 2 TIMES DAILY WITH MEALS
Status: DISCONTINUED | OUTPATIENT
Start: 2021-01-30 | End: 2021-02-05 | Stop reason: HOSPADM

## 2021-01-30 RX ORDER — NALOXONE HYDROCHLORIDE 0.4 MG/ML
0.4 INJECTION, SOLUTION INTRAMUSCULAR; INTRAVENOUS; SUBCUTANEOUS
Status: DISCONTINUED | OUTPATIENT
Start: 2021-01-30 | End: 2021-02-05 | Stop reason: HOSPADM

## 2021-01-30 RX ORDER — SUCRALFATE ORAL 1 G/10ML
1 SUSPENSION ORAL 4 TIMES DAILY PRN
Status: DISCONTINUED | OUTPATIENT
Start: 2021-01-30 | End: 2021-02-05 | Stop reason: HOSPADM

## 2021-01-30 RX ORDER — DIPHENHYDRAMINE HCL 25 MG
25 CAPSULE ORAL EVERY 12 HOURS
Status: DISCONTINUED | OUTPATIENT
Start: 2021-01-30 | End: 2021-01-30

## 2021-01-30 RX ORDER — HEPARIN SODIUM,PORCINE 10 UNIT/ML
5-10 VIAL (ML) INTRAVENOUS EVERY 24 HOURS
Status: DISCONTINUED | OUTPATIENT
Start: 2021-01-30 | End: 2021-02-05 | Stop reason: HOSPADM

## 2021-01-30 RX ORDER — LINEZOLID 2 MG/ML
600 INJECTION, SOLUTION INTRAVENOUS EVERY 12 HOURS
Status: DISCONTINUED | OUTPATIENT
Start: 2021-01-30 | End: 2021-01-30

## 2021-01-30 RX ORDER — LORAZEPAM 2 MG/ML
1 CONCENTRATE ORAL
Status: DISCONTINUED | OUTPATIENT
Start: 2021-01-30 | End: 2021-02-05 | Stop reason: HOSPADM

## 2021-01-30 RX ORDER — HEPARIN SODIUM,PORCINE 10 UNIT/ML
5-10 VIAL (ML) INTRAVENOUS
Status: DISCONTINUED | OUTPATIENT
Start: 2021-01-30 | End: 2021-02-05 | Stop reason: HOSPADM

## 2021-01-30 RX ORDER — NICOTINE 21 MG/24HR
1 PATCH, TRANSDERMAL 24 HOURS TRANSDERMAL DAILY
Status: DISCONTINUED | OUTPATIENT
Start: 2021-01-30 | End: 2021-02-04

## 2021-01-30 RX ORDER — ONDANSETRON 4 MG/1
8 TABLET, ORALLY DISINTEGRATING ORAL
Status: DISCONTINUED | OUTPATIENT
Start: 2021-01-30 | End: 2021-02-05 | Stop reason: HOSPADM

## 2021-01-30 RX ORDER — CEFTRIAXONE 2 G/1
2 INJECTION, POWDER, FOR SOLUTION INTRAMUSCULAR; INTRAVENOUS EVERY 24 HOURS
Status: DISCONTINUED | OUTPATIENT
Start: 2021-01-30 | End: 2021-01-30

## 2021-01-30 RX ORDER — LIDOCAINE 40 MG/G
CREAM TOPICAL
Status: DISCONTINUED | OUTPATIENT
Start: 2021-01-30 | End: 2021-02-05 | Stop reason: HOSPADM

## 2021-01-30 RX ORDER — OXYCODONE HCL 5 MG/5 ML
10 SOLUTION, ORAL ORAL ONCE
Status: COMPLETED | OUTPATIENT
Start: 2021-01-30 | End: 2021-01-30

## 2021-01-30 RX ORDER — DEXTROAMPHETAMINE SACCHARATE, AMPHETAMINE ASPARTATE, DEXTROAMPHETAMINE SULFATE AND AMPHETAMINE SULFATE 2.5; 2.5; 2.5; 2.5 MG/1; MG/1; MG/1; MG/1
20 TABLET ORAL DAILY
Status: DISCONTINUED | OUTPATIENT
Start: 2021-01-30 | End: 2021-02-05 | Stop reason: HOSPADM

## 2021-01-30 RX ORDER — CARVEDILOL 6.25 MG/1
6.25 TABLET ORAL ONCE
Status: COMPLETED | OUTPATIENT
Start: 2021-01-30 | End: 2021-01-30

## 2021-01-30 RX ORDER — OXYCODONE HCL 5 MG/5 ML
5 SOLUTION, ORAL ORAL 3 TIMES DAILY PRN
Status: DISCONTINUED | OUTPATIENT
Start: 2021-01-30 | End: 2021-01-30

## 2021-01-30 RX ORDER — ONDANSETRON 2 MG/ML
8 INJECTION INTRAMUSCULAR; INTRAVENOUS ONCE
Status: COMPLETED | OUTPATIENT
Start: 2021-01-30 | End: 2021-01-30

## 2021-01-30 RX ORDER — FENTANYL 25 UG/1
25 PATCH TRANSDERMAL
Status: DISCONTINUED | OUTPATIENT
Start: 2021-01-30 | End: 2021-02-05 | Stop reason: HOSPADM

## 2021-01-30 RX ORDER — DIPHENHYDRAMINE HCL 12.5MG/5ML
25 LIQUID (ML) ORAL EVERY 6 HOURS PRN
Status: DISCONTINUED | OUTPATIENT
Start: 2021-01-30 | End: 2021-02-04

## 2021-01-30 RX ADMIN — OXYCODONE HYDROCHLORIDE 10 MG: 5 SOLUTION ORAL at 04:23

## 2021-01-30 RX ADMIN — LIDOCAINE HYDROCHLORIDE 30 ML: 20 SOLUTION ORAL; TOPICAL at 01:56

## 2021-01-30 RX ADMIN — CARVEDILOL 6.25 MG: 6.25 TABLET, FILM COATED ORAL at 05:58

## 2021-01-30 RX ADMIN — DEXTROAMPHETAMINE SACCHARATE, AMPHETAMINE ASPARTATE, DEXTROAMPHETAMINE SULFATE AND AMPHETAMINE SULFATE 20 MG: 2.5; 2.5; 2.5; 2.5 TABLET ORAL at 10:21

## 2021-01-30 RX ADMIN — NICOTINE 1 PATCH: 14 PATCH, EXTENDED RELEASE TRANSDERMAL at 12:52

## 2021-01-30 RX ADMIN — CARVEDILOL 6.25 MG: 6.25 TABLET, FILM COATED ORAL at 17:30

## 2021-01-30 RX ADMIN — ONDANSETRON 8 MG: 2 INJECTION INTRAMUSCULAR; INTRAVENOUS at 06:01

## 2021-01-30 RX ADMIN — VANCOMYCIN HYDROCHLORIDE 1500 MG: 10 INJECTION, POWDER, LYOPHILIZED, FOR SOLUTION INTRAVENOUS at 16:18

## 2021-01-30 RX ADMIN — Medication 5 ML: at 20:19

## 2021-01-30 RX ADMIN — DIPHENHYDRAMINE HYDROCHLORIDE 25 MG: 25 SOLUTION ORAL at 15:43

## 2021-01-30 RX ADMIN — LINEZOLID 600 MG: 600 INJECTION, SOLUTION INTRAVENOUS at 09:30

## 2021-01-30 RX ADMIN — SUCRALFATE 1 G: 1 SUSPENSION ORAL at 17:30

## 2021-01-30 RX ADMIN — ONDANSETRON 8 MG: 4 TABLET, ORALLY DISINTEGRATING ORAL at 21:47

## 2021-01-30 RX ADMIN — OXYCODONE HYDROCHLORIDE 10 MG: 5 SOLUTION ORAL at 14:04

## 2021-01-30 RX ADMIN — VANCOMYCIN HYDROCHLORIDE 2000 MG: 1 INJECTION, POWDER, LYOPHILIZED, FOR SOLUTION INTRAVENOUS at 00:31

## 2021-01-30 RX ADMIN — OXYCODONE HYDROCHLORIDE 10 MG: 5 SOLUTION ORAL at 21:47

## 2021-01-30 RX ADMIN — FENTANYL 1 PATCH: 25 PATCH, EXTENDED RELEASE TRANSDERMAL at 10:27

## 2021-01-30 RX ADMIN — Medication 1 MG: at 21:53

## 2021-01-30 ASSESSMENT — ACTIVITIES OF DAILY LIVING (ADL)
TOILETING_ISSUES: NO
ADLS_ACUITY_SCORE: 9
COMMUNICATION: DIFFICULTY UNDERSTANDING
DRESSING/BATHING_DIFFICULTY: NO
DIFFICULTY_COMMUNICATING: NO
FALL_HISTORY_WITHIN_LAST_SIX_MONTHS: NO
CONCENTRATING,_REMEMBERING_OR_MAKING_DECISIONS_DIFFICULTY: NO
DIFFICULTY_EATING/SWALLOWING: NO
PATIENT_/_FAMILY_COMMUNICATION_STYLE: SPOKEN LANGUAGE (ENGLISH OR BILINGUAL)
WEAR_GLASSES_OR_BLIND: NO
ADLS_ACUITY_SCORE: 9
ADLS_ACUITY_SCORE: 9
DOING_ERRANDS_INDEPENDENTLY_DIFFICULTY: NO
HEARING_DIFFICULTY_OR_DEAF: NO
WALKING_OR_CLIMBING_STAIRS_DIFFICULTY: NO

## 2021-01-30 ASSESSMENT — MIFFLIN-ST. JEOR: SCORE: 1778.22

## 2021-01-30 NOTE — H&P
[facility]     History and Physical - [facility] Night Service        Date of Admission:  1/29/2021    Assessment & Plan   Parker Acevedo is a 50 year old male admitted on 1/29/2021. He is a 49 yo male with PMH significant for Celestino-en-Y gastric bypass, esophagojejunostomy c/b short gut syndrome and chronic malnutrition, and history of recurrent line-related infections, here after 1.5 days of fevers at home, found to have blood cultures positive for E. Faecalis. He is admitted for IV antibiotics. Hemodynamically stable here.    #E. faecalis bacteremia  #Hx of recurrent polymicrobial bacteremia  Has multiple past infections for bactermia/CLABSI. Admitted 7/28/20 with MRSE from Cm CVC (replaced 8/3/2020). Admitted again 8/31/2020 with blood cultures positive for staph lugdunensis. Cm removed and replaced at that August '20 admission. Follows with ID outpatient (Dr. Buckner). He had ~2 days of fever prior to this admission, so ID arranged blood cultures outpatient. Positive for gram positive cocci in clusters in 1/2 samples (unclear if peripheral or from line). Per Dr. Buckner, was started on IV Ceftriaxone and IV Vanco. Has since speciated as E. Faecalis. Cannot give IV ampicillin due to listed allergy (anaphylaxis). After discussion with pharmacy, will treat with IV Linezolid for now. He is hemodynamically stable here.  - ID aware of admission, will place formal consult given recurrent bacteremia  - Was on IV ceftriaxone 2g IV Q24H (note allergy to penicillins, though patient had tolerated ceftriaxone before) and IV vanco (pharmacy to dose)   - Given e faecalis, discussed with pharmacy and will transition to IV linezolid monotherapy. Cannot give IV ampicillin due to penicillin allergy.  - Follow blood cultures  - Consider TTE  - Will not use Cm at present for TPN; may need line replacement  - Per ID, IF patient requires current line to be removed, home  care has been advocating for future line to be one that would tolerate an ethanol lock to decrease future instances of bacteremia  - discussed with Dr. Buckner on 1/30    # Hx Celestino-en-Y gastric bypass, esophagojejunostomy c/b short gut syndrome and chronic malnutrition  # Chronic Abdominal Pain   Has been without G-tube for ~3 months, scheduled to be replaced.  - Continue PTA tylenol, fentanyl, oxycodone  - Holding home TPN  - Continue PTA PRN acetaminophen, fentanyl patch, and oxycodone  - Continue PTA GI cocktail PRN  - Continue PTA PRN sucralfate     #HTN: continue PTA coreg  # Asthma: continue albuterol inhaler  # ADHD: continue adderall   # Anxiety: continue lorazepam prn (for TPN and meds)  # GERD: continue pta PPI   # Normocytic anemia: 12.1 here, at baseline. Monitor     Diet:  Diet as tolerated, resume TPN when able  Fluids: None  DVT Prophylaxis: Pneumatic Compression Devices  Sanchez Catheter: not present  Code Status:   Full       Disposition Plan   Expected discharge: 2 - 3 days, recommended to prior living arrangement once antibiotic plan established.  Entered: Ousmane Del Toro MD 01/30/2021, 4:48 AM     Patient to be formally staffed in the AM.    Ousmane Del Toro MD  [facility]   Contact information available via [facility] Paging/Directory  Please see sign in/sign out for up to date coverage information  ______________________________________________________________________    Chief Complaint   Bacteremia    History is obtained from the patient and chart review    History of Present Illness   Parker Acevedo is a 50 year old male with PMH significant for Celestino-en-Y gastric bypass, esophagojejunostomy c/b short gut syndrome and chronic malnutrition, and history of recurrent line-related infections, here with bacteremia. Patient had been having fevers at home for ~2 days. Given history, ID coordinate blood cultures to be  drawn as outpatient. 1/2 bottles returned positive for GPC in clusters. Arrangement was, accordingly, made for inpatient admission and IV antibiotics. He notes that besides fevers (as high as 101.8 at home), he has also had some loose stools. Patient has been feeling otherwise at his baseline. He has abdominal pain that is chronic and unchanged. He has discomfort at site where G-tube was previously. He has been without this he says for ~3 months. No chest pain or shortness of breath. No blood in stool or urine. No significant new leg swelling.    In ED patient initial vitals were temp 98.4, , /92, Sats 100% in room air.  WBC wnl. Hgb 12.1 (stable). BMP largely unremarkable. Repeat blood cultures were drawn. Patient remained hemodynamically stable, admitted to medicine for further workup and management.    Review of Systems    The 10 point Review of Systems is negative other than noted in the HPI or here.     Past Medical History    I have reviewed this patient's medical history and updated it with pertinent information if needed.   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     Complication of anesthesia      Difficulty swallowing      Gastric ulcer, unspecified as acute or chronic, without mention of hemorrhage, perforation, or obstruction      Gastro-oesophageal reflux disease      Head injury      Hiatal hernia      Other bladder disorder      Other chronic pain      PONV (postoperative nausea and vomiting)      Severe malnutrition (H)     TPN     Short gut syndrome      Tobacco abuse       Past Surgical History   I have reviewed this patient's surgical history and updated it with pertinent information if needed.  Past Surgical History:    Procedure Laterality Date     AMPUTATION       APPENDECTOMY       BACK SURGERY  11/3/2014    curve in the spine     BIOPSY LYMPH NODE CERVICAL N/A 2/20/2015    Procedure: BIOPSY LYMPH NODE CERVICAL;  Surgeon: Baron Scanlon MD;  Location: [facility] OR     C GASTRIC BYPASS,OBESE<100CM SHAYLEE-EN-Y  2002    lost 300 pounds     CHOLECYSTECTOMY       DISCECTOMY, FUSION CERVICAL ANTERIOR ONE LEVEL, COMBINED N/A 2/15/2017    Procedure: COMBINED DISCECTOMY, FUSION CERVICAL ANTERIOR ONE LEVEL;  Surgeon: Darren Campos MD;  Location: [facility] OR     ENDOSCOPIC INSERTION TUBE GASTROSTOMY  9/9/2013    Procedure: ENDOSCOPIC INSERTION TUBE GASTROSTOMY;;  Surgeon: Francis Vyas MD;  [facility]     ENDOSCOPIC ULTRASOUND UPPER GASTROINTESTINAL TRACT (GI)  4/29/2011    Procedure:ENDOSCOPIC ULTRASOUND UPPER GASTROINTESTINAL TRACT (GI); Both Procedures done Conjointly; Surgeon:NEREIDA HOUSER; Location:[facility]     ENDOSCOPIC ULTRASOUND UPPER GASTROINTESTINAL TRACT (GI)  9/9/2013    Procedure: ENDOSCOPIC ULTRASOUND UPPER GASTROINTESTINAL TRACT (GI);  Endoscopic Ultrasound Guide Gastrostomy Tube Placement  C-arm;  Surgeon: Noe Lizarraga MD;  [facility]: [facility] OR     ENDOSCOPY  03/25/11    EGD, [facility]     ENDOSCOPY  08/04/09    Upper Endoscopy, [facility]     ENDOSCOPY  01/05/09    Upper Endoscopy, [facility]     ESOPHAGOSCOPY, GASTROSCOPY, DUODENOSCOPY (EGD), COMBINED  4/20/2011    Procedure:COMBINED ESOPHAGOSCOPY, GASTROSCOPY, DUODENOSCOPY (EGD); Surgeon:FRANCIS VYAS; [facility]     ESOPHAGOSCOPY, GASTROSCOPY, DUODENOSCOPY (EGD), COMBINED  6/15/2011    Procedure:COMBINED ESOPHAGOSCOPY, GASTROSCOPY, DUODENOSCOPY (EGD); Surgeon:FRANCIS VYAS; [facility]     ESOPHAGOSCOPY, GASTROSCOPY, DUODENOSCOPY (EGD), COMBINED  6/12/2013    Procedure: COMBINED ESOPHAGOSCOPY, GASTROSCOPY, DUODENOSCOPY (EGD);;  Surgeon: Francis Vyas MD;  [facility]     ESOPHAGOSCOPY, GASTROSCOPY,  DUODENOSCOPY (EGD), COMBINED  11/22/2013    Procedure: COMBINED ESOPHAGOSCOPY, GASTROSCOPY, DUODENOSCOPY (EGD);;  Surgeon: Francis Vyas MD;  [facility]     ESOPHAGOSCOPY, GASTROSCOPY, DUODENOSCOPY (EGD), COMBINED  4/30/2014    Procedure: COMBINED ESOPHAGOSCOPY, GASTROSCOPY, DUODENOSCOPY (EGD);  Surgeon: Francis Vyas MD;  Location:  GI     ESOPHAGOSCOPY, GASTROSCOPY, DUODENOSCOPY (EGD), COMBINED N/A 2/20/2015    Procedure: COMBINED ESOPHAGOSCOPY, GASTROSCOPY, DUODENOSCOPY (EGD), BIOPSY SINGLE OR MULTIPLE;  Surgeon: Baron Scanlon MD;  Location:  OR     ESOPHAGOSCOPY, GASTROSCOPY, DUODENOSCOPY (EGD), COMBINED N/A 9/30/2015    Procedure: COMBINED ESOPHAGOSCOPY, GASTROSCOPY, DUODENOSCOPY (EGD);  Surgeon: Francis Vyas MD;  Location:  GI     ESOPHAGOSCOPY, GASTROSCOPY, DUODENOSCOPY (EGD), COMBINED N/A 10/3/2019    Procedure: Upper Endoscopy;  Surgeon: Clif Morrow MD;  Location:  OR     GASTRECTOMY  6/22/2012    Procedure: GASTRECTOMY;  Open Approach, Excise Ulcers,Partial Gastrectomy, Esophagojejunostomy, Hiatal Hernia Repair, Extensive Lysis of Adhesions and Esaphagogastrodudenoscopy.;  Surgeon: Francis Vyas MD;  [facility]     GASTROJEJUNOSTOMY  08/26/09    Extensice enterolysis, partial resect. jejunum, part. resect gastric pouch, gastrojejunostomy anastomosis     HC ESOPH/GAS REFLUX TEST W NASAL IMPED ELECTRODE  8/5/2013    Procedure: ESOPHAGEAL IMPEDENCE FUNCTION TEST 1 HOUR OR LESS;  Surgeon: Halie Lang MD;  Location:  GI     HEAD & NECK SURGERY  2/15/2017    C5-C6     HERNIA REPAIR  2006    Umbilical hernia     HERNIORRHAPHY HIATAL  6/22/2012    Procedure: HERNIORRHAPHY HIATAL;;  Surgeon: Francis Vyas MD;  Location:  OR     HERNIORRHAPHY INGUINAL  11/22/2013    Procedure: HERNIORRHAPHY INGUINAL;;  Surgeon: Francis Vyas MD;  [facility]     INSERT PICC LINE Right 12/19/2019    Procedure: Picc Placement;  Surgeon:  Per Dumont PA-C;  [facility]     INSERT PICC LINE Right 2/21/2020    Procedure: INSERTION, PICC;  Surgeon: Per Dumont PA-C;  [facility]     INSERT PORT VASCULAR ACCESS Right 12/19/2017    Procedure: INSERT PORT VASCULAR ACCESS;  Right Chest Port Placement ;  Surgeon: Lisandro Alejandro PA-C;  [facility]     INSERT PORT VASCULAR ACCESS Right 8/2/2018    Procedure: INSERT PORT VASCULAR ACCESS;  Place single lumen tunneled central venous access catheter;  Surgeon: Guy Jamil PA-C;  [facility]     IR CVC TUNNEL PLACEMENT > 5 YRS OF AGE  8/7/2019     IR CVC TUNNEL PLACEMENT > 5 YRS OF AGE  4/14/2020     IR CVC TUNNEL PLACEMENT > 5 YRS OF AGE  8/3/2020     IR CVC TUNNEL PLACEMENT > 5 YRS OF AGE  9/4/2020     IR CVC TUNNEL REMOVAL RIGHT  10/1/2019     IR CVC TUNNEL REMOVAL RIGHT  7/30/2020     IR CVC TUNNEL REMOVAL RIGHT  9/2/2020     IR FOLLOW UP VISIT OUTPATIENT  8/7/2019     IR PICC PLACEMENT > 5 YRS OF AGE  3/7/2019     IR PICC PLACEMENT > 5 YRS OF AGE  12/19/2019     IR PICC PLACEMENT > 5 YRS OF AGE  2/21/2020     LAPAROTOMY EXPLORATORY  11/22/2013    Procedure: LAPAROTOMY EXPLORATORY;  Exploratory Laparotomy, Upper Endoscopy, Left Inguinal Hernia Repair;  Surgeon: Francis Vyas MD;  [facility]     ORTHOPEDIC SURGERY       PICC INSERTION Right 03/16/2017    5fr DL BioFlo PICC, 42cm (3cm external) in the R medial brachial vein w/ tip in the SVC RA junction.     PICC INSERTION Left 09/23/2017    5fr DL BioFlo PICC, 45cm (1cm external) in the L basilic vein w/ tip in the SVC RA junction.     PICC INSERTION Right 05/16/2019    5Fr - 43cm, Medial brachial vein, low SVC     PICC INSERTION Right 10/02/2019    5Fr - 43cm (2cm external), basilic vein, low SVC     SHAYLEE EN Y BOWEL  2003     SOFT TISSUE SURGERY       THORACIC SURGERY       TONSILLECTOMY       TRANSESOPHAGEAL ECHOCARDIOGRAM INTRAOPERATIVE N/A 1/8/2019    Procedure: TRANSESOPHAGEAL ECHOCARDIOGRAM  "INTRAOPERATIVE;  Surgeon: GENERIC ANESTHESIA PROVIDER;  Location: [facility]     Social History   Lives with wife and son in [place]. He smokes cigarettes daily. Very little EtOH use. No recreational drug use.    Family History   I have reviewed this patient's family history and updated it with pertinent information if needed.  Family History   Problem Relation Age of Onset     Gastrointestinal Disease Mother         Crohns disease     Anxiety Disorder Mother      Thyroid Disease Mother         Grave's disease     Cancer Father         ear cancer-skin cancer/melanoma     Breast Cancer Maternal Grandmother      Macular Degeneration Maternal Grandfather      Anxiety Disorder Sister      Diabetes Maternal Uncle      Breast Cancer Other      Hypertension No family hx of      Hyperlipidemia No family hx of      Cerebrovascular Disease No family hx of      Prostate Cancer No family hx of      Depression No family hx of      Anesthesia Reaction No family hx of      Asthma No family hx of      Osteoporosis No family hx of      Genetic Disorder No family hx of      Obesity No family hx of      Mental Illness No family hx of      Substance Abuse No family hx of      Glaucoma No family hx of        Prior to Admission Medications   Prior to Admission Medications   Prescriptions Last Dose Informant Patient Reported? Taking?   [facility] MANAGED PATIENT  Self No No   Sig: Contact [facility] for patient specific medication information at [phone] on admission and discharge from the hospital.  Phones are answered 24 hours a day 7 days a week 365 days a year.    Providers - Choose \"CONTINUE HOME MED (no script)\" at discharge if patient treatment with home infusion will continue.   LORazepam (ATIVAN) 1 MG tablet   No No   Sig: TAKE 1 TABLET (1MG) BY MOUTH AS NEEDED FOR ANXIETY WITH TPN AND MEDICATIONS . JUST ONCE A DAY (30 TO LAST 30 DAYS)   Lidocaine (LIDOCARE) 4 % Patch   Yes No   Sig: Place 1 patch onto the " skin every 24 hours To prevent lidocaine toxicity, patient should be patch free for 12 hrs daily.   VENTOLIN  (90 Base) MCG/ACT inhaler   No No   Sig: INHALE TWO PUFFS BY MOUTH EVERY 4 HOURS AS NEEDED FOR SHORTNESS OF BREATH /DYSPNEA OR WHEEZING   acetaminophen (TYLENOL) 32 mg/mL liquid   No No   Sig: Take 15.65 mLs (500 mg) by mouth every 4 hours as needed for fever or mild pain   alteplase 2 mg/2 mL (in 10 mL syringe)   Yes No   Sig: Inject 1 mg into the vein as needed   amphetamine-dextroamphetamine (ADDERALL) 20 MG tablet   No No   Sig: TAKE ONE TABLET BY MOUTH ONCE DAILY   carvedilol (COREG) 6.25 MG tablet  Self Yes No   Sig: Take 6.25 mg by mouth 2 times daily (with meals)   cyanocobalamin (CYANOCOBALAMIN) 1000 MCG/ML injection   No No   Sig: Inject 1 mL (1,000 mcg) into the muscle every 30 days   diphenhydrAMINE (BENADRYL) 25 MG capsule   Yes No   Sig: Take 1 capsule (25 mg) by mouth every 8 hours 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   May fill 21 and start 21   lidocaine 7.5 mL, alum & mag hydroxide-simethicone 7.5 mL GI Cocktail   No No   Sig: Take 15 mLs by mouth once as needed for mouth, throat or stomach pain   lidocaine, alum & mag hydroxide-simethicone GI Cocktail   No No   Si ml daily as needed for mouth/stomach pain.   naloxone (NARCAN) 4 MG/0.1ML nasal spray   No No   Sig: Spray 1 spray (4 mg) into one nostril alternating nostrils as needed for opioid reversal every 2-3 minutes until assistance arrives   nicotine (COMMIT) 2 MG lozenge   Yes No   Sig: Place 1 lozenge (2 mg) inside cheek every hour as needed for smoking cessation   nicotine (NICODERM CQ) 21 MG/24HR 24 hr patch   Yes No   Sig: Place 1 patch onto the skin every 24 hours   nystatin (MYCOSTATIN) 259412 UNIT/GM external cream   No No   Sig: Apply topically 2 times  daily   ondansetron (ZOFRAN-ODT) 8 MG ODT tab   No No   Sig: DISSOLVE ONE TABLET ON TONGUE EVERY 8 HOURS AS NEEDED FOR NAUSEA   oxyCODONE (ROXICODONE) 5 MG/5ML solution   No No   Sig: Take 5-10 mLs (5-10 mg) by mouth 3 times daily as needed for pain Max of 30mg/day. Fill 02/06/21 to start on/after 02/09/21. 30 day supply.   pantoprazole (PROTONIX) 40 MG EC tablet   No No   Sig: Take 1 tablet (40 mg) by mouth daily   parenteral nutrition - PTA/DISCHARGE ORDER   Yes No   Sig: Patient receives 2050 mL TPN every 14 hours through PICC at [facility].   polyethylene glycol (MIRALAX) 17 g packet   No No   Sig: Take 17 g by mouth daily   sucralfate (CARAFATE) 1 GM/10ML suspension   Yes No   Sig: Take 1 g by mouth 4 times daily as needed   vitamin D2 (ERGOCALCIFEROL) 68395 units (1250 mcg) capsule   No No   Sig: Take 1 capsule (50,000 Units) by mouth once a week      Facility-Administered Medications: None     Allergies   Allergies   Allergen Reactions     Bactrim [Sulfamethoxazole W/Trimethoprim] Rash     Penicillins Anaphylaxis     Please see Antimicrobial Management Team allergy assessment note 10/10/2018. Patient reported tolerating amoxicillin.     Doxycycline Rash     Vancomycin Rash     Rash after receiving vancomycin 3/28/16 (red man's?). Tolerated with slower infusion and diphenhydramine premed.       Physical Exam   Vital Signs: Temp: 98.4  F (36.9  C) Temp src: Oral BP: 133/88 Pulse: 78   Resp: 16 SpO2: 99 % O2 Device: None (Room air)    Weight: 0 lbs 0 oz     General: Well-appearing, sitting at edge of bed, NAD  HEENT: NC/AT, EOMI, MMM  Cardiovascular: RRR, no murmurs appreciated  Pulm: CTAB, normal effort, no wheezes or crackles  Abdomen: Soft, non-tender, non-distended, +BS , g-tube site is c/d/i, not draining  Extremities: WWP, no peripheral edema  Skin: Warm, dry, azevedo in place, dressing c/d/i  Neuro: A&Ox3, CNII-XII grossly intact, moves all extremities, no focal deficits, sensation  intact    Data   Data reviewed today: I reviewed all medications, new labs and imaging results over the last 24 hours.     Recent Labs   Lab 01/30/21  0619 01/29/21  2312 01/28/21  1730 01/26/21  1230   WBC 8.9 9.4 7.7 5.9   HGB 12.4* 12.1* 12.2* 11.5*   MCV 90 89 90 89    239 244 211   INR  --  1.04  --   --    NA  --  140  --  144   POTASSIUM  --  3.8  --  3.4   CHLORIDE  --  108  --  111*   CO2  --  28  --  29   BUN  --  14  --  8   CR  --  0.67  --  0.52*   ANIONGAP  --  5  --  4   SILAS  --  8.8  --  8.5   GLC  --  96  --  84   ALBUMIN  --  3.4  --  3.2*   PROTTOTAL  --  6.7*  --  6.4*   BILITOTAL  --  1.0  --  0.3   ALKPHOS  --  69  --  66   ALT  --  40  --  30   AST  --  19  --  22     Most Recent 6 Bacteria Isolates From Any Culture (See EPIC Reports for Culture Details):  Recent Labs   Lab Test 01/29/21  2312 01/28/21  1730 11/17/20  1450 11/17/20  1440 11/17/20  1425 10/29/20  1445   CULT PENDING Cultured on the 1st day of incubation:  Enterococcus faecalis  Susceptibility testing in progress  *  Critical Value/Significant Value, preliminary result only, called to and read back by   at 1325 1.29.21 KZ    Cultured on the 1st day of incubation:  Gram positive cocci in clusters  *  Critical Value/Significant Value, preliminary result only, called to and read back by  Dr. Buckner 1915 01.29.2021 NM    (Note)  POSITIVE for ENTEROCOCCUS FAECALIS and NEGATIVE for Melvin/vanB genes  by Tavernigene multiplex nucleic acid test. Final identification and  antimicrobial susceptibility testing will be verified by standard  methods.    Specimen tested with Verigene multiplex, gram-positive blood culture  nucleic acid test for the following targets: Staph aureus, Staph  epidermidis, Staph lugdunensis, other Staph species, Enterococcus  faecalis, Enterococcus faecium, Streptococcus species, S. agalactiae,  S. anginosus grp., S. pneumoniae, S. pyogenes, Listeria sp., mecA  (methicillin resistance) and Melvin/B  (vancomycin resistance).    Critical Value/Significant Value called to and read back by Dr. Buckner, 1.29.21 @ 5154 pt.    POSITIVE for Staphylococci other than S.aureus, S.epidermidis and  S.lugdunensis, by [email or web address] multiplex nucleic acid test.  Coagulase-negative staphylococci are the most common venipuncture or  collection associated skin CONTAMINANTS grown in blood cultures.  Final identificati on and antimicrobial susceptibility testing will be  verified by standard methods.    Specimen tested with Verigene multiplex, gram-positive blood culture  nucleic acid test for the following targets: Staph aureus, Staph  epidermidis, Staph lugdunensis, other Staph species, Enterococcus  faecalis, Enterococcus faecium, Streptococcus species, S. agalactiae,  S. anginosus grp., S. pneumoniae, S. pyogenes, Listeria sp., mecA  (methicillin resistance) and Melvin/B (vancomycin resistance).    Critical Value/Significant Value called to and read back by Dr. Buckner  1917 01.29.2021 NM    No growth after 18 hours No growth No growth No growth No growth       Internal Medicine Staff Addendum  Date of Service: 1/30/2021    I have seen and examined this patient, reviewed the data and discussed the plan of care. I agree with the above documentation including plan and ddx unless otherwise stated:     # Discussed with Dr. Buckner. Pt is doing well today. Less fevers, chills. Understands azevedo may need replacement. Not sure how it could have gotten infected. Cares well for it. Peripheral BC drawn for verification and surveillance. Stopping linezolid, starting vanc.      Bakari Oliver MD  Internal Medicine [place] [facility]ist  [facility]  Attending pager: [phone]

## 2021-01-30 NOTE — ED NOTES
Bed: IN03  Expected date:   Expected time:   Means of arrival:   Comments:  Aprker F ? Coming from home, TPN by [facility], positive BC  ID left notefor rec's     Willian Mcdaniel MD  01/29/21 4038

## 2021-01-30 NOTE — PHARMACY-VANCOMYCIN DOSING SERVICE
Pharmacy Vancomycin Initial Note  Date of Service 2021  Patient's  1970  50 year old, male    Indication: Bacteremia    Current estimated CrCl = Estimated Creatinine Clearance: 166.1 mL/min (based on SCr of 0.67 mg/dL).    Creatinine for last 3 days  2021: 11:12 PM Creatinine 0.67 mg/dL    Recent Vancomycin Level(s) for last 3 days  No results found for requested labs within last 72 hours.      Vancomycin IV Administrations (past 72 hours)      No vancomycin orders with administrations in past 72 hours.                Nephrotoxins and other renal medications (From now, onward)    Start     Dose/Rate Route Frequency Ordered Stop    21 1300  vancomycin 1500 mg in 0.9% NaCl 250 ml intermittent infusion 1,500 mg      1,500 mg  over 180 Minutes Intravenous EVERY 12 HOURS 21 2354      21 2330  vancomycin (VANCOCIN) 2,000 mg in sodium chloride 0.9 % 500 mL intermittent infusion      2,000 mg  over 4 Hours Intravenous ONCE 21 2258            Contrast Orders - past 72 hours (72h ago, onward)    None                Plan:  1.  Give vancomycin 2000mg IV x1 now, then start vancomycin 1500mg IV q12 hours. Of note, patient has history of Tom's syndrome, but does tolerate vancomycin with slow infusion and diphenhydramine premed  2.  Goal Trough Level: 15-20 mg/L   3.  Pharmacy will check trough levels as appropriate in 1-3 Days.    4. Serum creatinine levels will be ordered daily for the first week of therapy and at least twice weekly for subsequent weeks.    5. [place] method utilized to dose vancomycin therapy: Method 2    Rene Weeks, WaiD, BCPS

## 2021-01-30 NOTE — ED TRIAGE NOTES
Patient arrives with positive blood cultures. Recieves TPN through [facility]. MD recommended he come to ER.

## 2021-01-30 NOTE — PHARMACY-VANCOMYCIN DOSING SERVICE
Pharmacy Vancomycin Initial Note  Date of Service 2021  Patient's  1970  50 year old, male    Indication: Bacteremia    Current estimated CrCl = Estimated Creatinine Clearance: 167.4 mL/min (based on SCr of 0.67 mg/dL).    Creatinine for last 3 days  2021: 11:12 PM Creatinine 0.67 mg/dL    Recent Vancomycin Level(s) for last 3 days  No results found for requested labs within last 72 hours.      Vancomycin IV Administrations (past 72 hours)                   vancomycin (VANCOCIN) 2,000 mg in sodium chloride 0.9 % 500 mL intermittent infusion (mg) 2,000 mg New Bag 21 0031                Nephrotoxins and other renal medications (From now, onward)    Start     Dose/Rate Route Frequency Ordered Stop    21 1400  vancomycin 1500 mg in 0.9% NaCl 250 ml intermittent infusion 1,500 mg      1,500 mg  over 180 Minutes Intravenous EVERY 12 HOURS 21 1328            Contrast Orders - past 72 hours (72h ago, onward)    None                Plan:  1.  Re-start vancomycin 1500 mg IV q12h. Patient received a one-time dose of 2,000 mg IV today at 0031. Patient has history of Tom's syndrome, but does tolerate vancomycin with slow infusion and diphenhydramine pre-med.  2.  Goal Trough Level: 15-20 mg/L   3.  Pharmacy will check trough levels as appropriate in 1-3 Days.    4. Serum creatinine levels will be ordered daily for the first week of therapy and at least twice weekly for subsequent weeks.    5. [place] method utilized to dose vancomycin therapy: Method 2    Lisandro Rivas, WaiD

## 2021-01-30 NOTE — PROGRESS NOTES
ID Chart Note:   1/30/2021     I follow Parker in clinic and was in touch with his wife this morning because she had left me a message last night during the admission process. Parker had reported subjective fevers and chills at home x 1 week and so we ordered 2 sets of blood cultures. I was called yesterday about 1/4 bottles with GPCs in pairs and chains (now E faecalis). A second bottle subsequently became positive for GPCs in clusters (now S hominis). The sites are unlabeled, but per his wife both were drawn from the line (one from each port) because the nurse couldn't get a peripheral draw. There are 4 bottles in total and from one Cm port 1 bottle is growing E faecalis and the other is growing S hominis. The aerobic and anaerobic bottles from the other port have no growth.     Very unclear at this point if these are contaminants or reflect line infection, especially given his history of true line infections with skin deepa in the past.     Recommendations:   - For the weekend, treat wtih vancomycin and monitor for additional positive cultures from original draw or additional cultures drawn from ED prior to abx.   - No need for linezolid since this is E faecalis.   - Vancomycin will also cover CoNS  -  Avoiding penicillins due to allergy   - No additional workup or blood cultures needed over the weekend unless new symptoms or positive cultures arise  - Consider ID consult on Monday. Will cancel order today after discussion with team.    - If he feels exactly the same and has no additional growth on cultures, we may consider retaining line and pursuing course of vancomycin at home (as of last check, he had special arrangements to be able to receive vancomycin at home).     Discussed with primary team.      Марина Buckner MD  Infectious Diseases

## 2021-01-30 NOTE — PROVIDER NOTIFICATION
Provider paged/notified for positive blood culture collected 01/29 from red line with gram positive cocci in pairs & chains.  Linezolid discontinued and starting back on vancomycin 1500mg q12 hrs.

## 2021-01-30 NOTE — ED NOTES
Patient notified as below and verbalizes understanding.   Pt. Refused COVID swab until he gets his next dose of pain meds. Going to bathroom currently.

## 2021-01-30 NOTE — ED PROVIDER NOTES
ED Provider Note  [facility]      History     Chief Complaint   Patient presents with     Infection     HPI  Parker Acevedo is a 50 year old male who presents with positive blood culture. He has a history of short gut syndrome and is on chronic TPN at night. He developed fever and chills 3 days ago. And has been taking tylenol for subjective fevers night sweats for the past 2 days. He has had several central line infections in the past. The current Cm was placed in August after line infection. He has no URI symptoms, cough, sputum, shortness of breath, nausea, vomiting abdominal pain, leg pain, swelling of skin rash. He was called back to the ED today. 1 of 2 blood cultures from yesterday is growing gram positive cocci in clusters and gram positive cocci in pairs and chains verigene testing is positive for enterococcus and for staph, non staph aureus.    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     Complication of anesthesia      Difficulty swallowing      Gastric ulcer, unspecified as acute or chronic, without mention of hemorrhage, perforation, or obstruction      Gastro-oesophageal reflux disease      Head injury      Hiatal hernia      Other bladder disorder      Other chronic pain      PONV (postoperative nausea and vomiting)      Severe malnutrition (H)     TPN     Short gut syndrome      Tobacco abuse        Past Surgical History:   Procedure Laterality Date     AMPUTATION       APPENDECTOMY       BACK SURGERY  11/3/2014    curve in the spine     BIOPSY LYMPH NODE CERVICAL N/A 2/20/2015    Procedure: BIOPSY LYMPH NODE CERVICAL;  Surgeon: Baron Scanlon MD;  [facility]     C GASTRIC  BYPASS,OBESE<100CM SHAYLEE-EN-Y  2002    lost 300 pounds     CHOLECYSTECTOMY       DISCECTOMY, FUSION CERVICAL ANTERIOR ONE LEVEL, COMBINED N/A 2/15/2017    Procedure: COMBINED DISCECTOMY, FUSION CERVICAL ANTERIOR ONE LEVEL;  Surgeon: Darren Campos MD;  [facility]     ENDOSCOPIC INSERTION TUBE GASTROSTOMY  9/9/2013    Procedure: ENDOSCOPIC INSERTION TUBE GASTROSTOMY;;  Surgeon: Francis Vyas MD;  [facility]     ENDOSCOPIC ULTRASOUND UPPER GASTROINTESTINAL TRACT (GI)  4/29/2011    Procedure:ENDOSCOPIC ULTRASOUND UPPER GASTROINTESTINAL TRACT (GI); Both Procedures done Conjointly; Surgeon:NEREIDA HOUSER; [facility]     ENDOSCOPIC ULTRASOUND UPPER GASTROINTESTINAL TRACT (GI)  9/9/2013    Procedure: ENDOSCOPIC ULTRASOUND UPPER GASTROINTESTINAL TRACT (GI);  Endoscopic Ultrasound Guide Gastrostomy Tube Placement  C-arm;  Surgeon: Noe Lizarraga MD;  [facility]     ENDOSCOPY  03/25/11    EGD, [facility]     ENDOSCOPY  08/04/09    Upper Endoscopy, [facility]     ENDOSCOPY  01/05/09    Upper Endoscopy, [facility]     ESOPHAGOSCOPY, GASTROSCOPY, DUODENOSCOPY (EGD), COMBINED  4/20/2011    Procedure:COMBINED ESOPHAGOSCOPY, GASTROSCOPY, DUODENOSCOPY (EGD); Surgeon:FRANCIS VYAS; [facility]     ESOPHAGOSCOPY, GASTROSCOPY, DUODENOSCOPY (EGD), COMBINED  6/15/2011    Procedure:COMBINED ESOPHAGOSCOPY, GASTROSCOPY, DUODENOSCOPY (EGD); Surgeon:FRANCIS VYAS; [facility]     ESOPHAGOSCOPY, GASTROSCOPY, DUODENOSCOPY (EGD), COMBINED  6/12/2013    Procedure: COMBINED ESOPHAGOSCOPY, GASTROSCOPY, DUODENOSCOPY (EGD);;  Surgeon: Francis Vyas MD;  [facility]     ESOPHAGOSCOPY, GASTROSCOPY, DUODENOSCOPY (EGD), COMBINED  11/22/2013    Procedure: COMBINED ESOPHAGOSCOPY, GASTROSCOPY, DUODENOSCOPY (EGD);;  Surgeon: Francis Vyas MD;  [facility]     ESOPHAGOSCOPY, GASTROSCOPY, DUODENOSCOPY (EGD), COMBINED  4/30/2014    Procedure: COMBINED  ESOPHAGOSCOPY, GASTROSCOPY, DUODENOSCOPY (EGD);  Surgeon: Francis Vyas MD;  [facility]     ESOPHAGOSCOPY, GASTROSCOPY, DUODENOSCOPY (EGD), COMBINED N/A 2/20/2015    Procedure: COMBINED ESOPHAGOSCOPY, GASTROSCOPY, DUODENOSCOPY (EGD), BIOPSY SINGLE OR MULTIPLE;  Surgeon: Baron Scanlon MD;  [facility]     ESOPHAGOSCOPY, GASTROSCOPY, DUODENOSCOPY (EGD), COMBINED N/A 9/30/2015    Procedure: COMBINED ESOPHAGOSCOPY, GASTROSCOPY, DUODENOSCOPY (EGD);  Surgeon: Francis Vyas MD;  Location:  GI     ESOPHAGOSCOPY, GASTROSCOPY, DUODENOSCOPY (EGD), COMBINED N/A 10/3/2019    Procedure: Upper Endoscopy;  Surgeon: Clif Morrow MD;  [facility]     GASTRECTOMY  6/22/2012    Procedure: GASTRECTOMY;  Open Approach, Excise Ulcers,Partial Gastrectomy, Esophagojejunostomy, Hiatal Hernia Repair, Extensive Lysis of Adhesions and Esaphagogastrodudenoscopy.;  Surgeon: Francis Vyas MD;  [facility]     GASTROJEJUNOSTOMY  08/26/09    Extensice enterolysis, partial resect. jejunum, part. resect gastric pouch, gastrojejunostomy anastomosis     HC ESOPH/GAS REFLUX TEST W NASAL IMPED ELECTRODE  8/5/2013    Procedure: ESOPHAGEAL IMPEDENCE FUNCTION TEST 1 HOUR OR LESS;  Surgeon: Halie Lang MD;  Location:  GI     HEAD & NECK SURGERY  2/15/2017    C5-C6     HERNIA REPAIR  2006    Umbilical hernia     HERNIORRHAPHY HIATAL  6/22/2012    Procedure: HERNIORRHAPHY HIATAL;;  Surgeon: Francis Vyas MD;  [facility]     HERNIORRHAPHY INGUINAL  11/22/2013    Procedure: HERNIORRHAPHY INGUINAL;;  Surgeon: Francis Vyas MD;  [facility]     INSERT PICC LINE Right 12/19/2019    Procedure: Picc Placement;  Surgeon: Per Dumont PA-C;  [facility]     INSERT PICC LINE Right 2/21/2020    Procedure: INSERTION, PICC;  Surgeon: Per Dumont PA-C;  [facility]     INSERT PORT VASCULAR ACCESS Right 12/19/2017    Procedure: INSERT PORT VASCULAR ACCESS;   Right Chest Port Placement ;  Surgeon: Lisandro Alejandro PA-C;  [facility]     INSERT PORT VASCULAR ACCESS Right 8/2/2018    Procedure: INSERT PORT VASCULAR ACCESS;  Place single lumen tunneled central venous access catheter;  Surgeon: Guy Jamil PA-C;  [facility]     IR CVC TUNNEL PLACEMENT > 5 YRS OF AGE  8/7/2019     IR CVC TUNNEL PLACEMENT > 5 YRS OF AGE  4/14/2020     IR CVC TUNNEL PLACEMENT > 5 YRS OF AGE  8/3/2020     IR CVC TUNNEL PLACEMENT > 5 YRS OF AGE  9/4/2020     IR CVC TUNNEL REMOVAL RIGHT  10/1/2019     IR CVC TUNNEL REMOVAL RIGHT  7/30/2020     IR CVC TUNNEL REMOVAL RIGHT  9/2/2020     IR FOLLOW UP VISIT OUTPATIENT  8/7/2019     IR PICC PLACEMENT > 5 YRS OF AGE  3/7/2019     IR PICC PLACEMENT > 5 YRS OF AGE  12/19/2019     IR PICC PLACEMENT > 5 YRS OF AGE  2/21/2020     LAPAROTOMY EXPLORATORY  11/22/2013    Procedure: LAPAROTOMY EXPLORATORY;  Exploratory Laparotomy, Upper Endoscopy, Left Inguinal Hernia Repair;  Surgeon: Francis Vyas MD;  [facility]     ORTHOPEDIC SURGERY       PICC INSERTION Right 03/16/2017    5fr DL BioFlo PICC, 42cm (3cm external) in the R medial brachial vein w/ tip in the SVC RA junction.     PICC INSERTION Left 09/23/2017    5fr DL BioFlo PICC, 45cm (1cm external) in the L basilic vein w/ tip in the SVC RA junction.     PICC INSERTION Right 05/16/2019    5Fr - 43cm, Medial brachial vein, low SVC     PICC INSERTION Right 10/02/2019    5Fr - 43cm (2cm external), basilic vein, low SVC     SHAYLEE EN Y BOWEL  2003     SOFT TISSUE SURGERY       THORACIC SURGERY       TONSILLECTOMY       TRANSESOPHAGEAL ECHOCARDIOGRAM INTRAOPERATIVE N/A 1/8/2019    Procedure: TRANSESOPHAGEAL ECHOCARDIOGRAM INTRAOPERATIVE;  Surgeon: GENERIC ANESTHESIA PROVIDER;  [facility]       Family History   Problem Relation Age of Onset     Gastrointestinal Disease Mother         Crohns disease     Anxiety Disorder Mother      Thyroid Disease Mother         Grave's  "disease     Cancer Father         ear cancer-skin cancer/melanoma     Breast Cancer Maternal Grandmother      Macular Degeneration Maternal Grandfather      Anxiety Disorder Sister      Diabetes Maternal Uncle      Breast Cancer Other      Hypertension No family hx of      Hyperlipidemia No family hx of      Cerebrovascular Disease No family hx of      Prostate Cancer No family hx of      Depression No family hx of      Anesthesia Reaction No family hx of      Asthma No family hx of      Osteoporosis No family hx of      Genetic Disorder No family hx of      Obesity No family hx of      Mental Illness No family hx of      Substance Abuse No family hx of      Glaucoma No family hx of        Social History     Tobacco Use     Smoking status: Current Some Day Smoker     Packs/day: 1.00     Years: 6.00     Pack years: 6.00     Types: Cigarettes     Smokeless tobacco: Never Used   Substance Use Topics     Alcohol use: No     Frequency: Never     Drinks per session: Patient refused     Binge frequency: Never     Comment: quit 2002          Review of Systems   Constitutional: Positive for chills, diaphoresis and fever.   HENT: Negative for congestion and trouble swallowing.    Eyes: Negative for visual disturbance.   Respiratory: Negative for cough, shortness of breath and wheezing.    Cardiovascular: Negative for chest pain.   Gastrointestinal: Negative for abdominal pain, nausea and vomiting.   Genitourinary: Negative for difficulty urinating.   Musculoskeletal: Negative for back pain and neck pain.   Skin: Negative for rash.   Neurological: Negative for weakness, light-headedness, numbness and headaches.   Hematological: Negative for adenopathy.   Psychiatric/Behavioral: Negative for confusion.         Physical Exam   BP: (!) 135/92  Pulse: 107  Temp: 98.4  F (36.9  C)  Resp: 16  Height: 181.6 cm (5' 11.5\")  SpO2: 100 %  Physical Exam  Vitals signs and nursing note reviewed.   Constitutional:       Appearance: Normal " appearance.   HENT:      Head: Normocephalic and atraumatic.      Right Ear: External ear normal.      Left Ear: External ear normal.      Nose: Nose normal.      Mouth/Throat:      Mouth: Mucous membranes are moist.   Eyes:      Extraocular Movements: Extraocular movements intact.      Pupils: Pupils are equal, round, and reactive to light.   Neck:      Musculoskeletal: Normal range of motion and neck supple.   Cardiovascular:      Rate and Rhythm: Normal rate and regular rhythm.      Heart sounds: No murmur.      Comments: Cm site right upper chest clean and dry.  Pulmonary:      Effort: Respiratory distress present.      Breath sounds: Stridor present. No rales.   Abdominal:      General: Abdomen is flat.      Palpations: Abdomen is soft.      Tenderness: There is no abdominal tenderness.      Comments: Multiple healed scars.   Musculoskeletal:      Right lower leg: No edema.      Left lower leg: No edema.   Skin:     General: Skin is warm and dry.   Neurological:      General: No focal deficit present.      Mental Status: He is alert and oriented to person, place, and time.      Cranial Nerves: No cranial nerve deficit.      Motor: No weakness.   Psychiatric:         Mood and Affect: Mood normal.         Behavior: Behavior normal.         ED Course      Procedures          Labs/Imaging    Results for orders placed or performed during the hospital encounter of 01/29/21 (from the past 24 hour(s))   CBC with platelets differential   Result Value Ref Range    WBC 9.4 4.0 - 11.0 10e9/L    RBC Count 4.20 (L) 4.4 - 5.9 10e12/L    Hemoglobin 12.1 (L) 13.3 - 17.7 g/dL    Hematocrit 37.4 (L) 40.0 - 53.0 %    MCV 89 78 - 100 fl    MCH 28.8 26.5 - 33.0 pg    MCHC 32.4 31.5 - 36.5 g/dL    RDW 20.9 (H) 10.0 - 15.0 %    Platelet Count 239 150 - 450 10e9/L    Diff Method Automated Method     % Neutrophils 60.8 %    % Lymphocytes 26.2 %    % Monocytes 11.3 %    % Eosinophils 1.0 %    % Basophils 0.4 %    % Immature  Granulocytes 0.3 %    Nucleated RBCs 0 0 /100    Absolute Neutrophil 5.7 1.6 - 8.3 10e9/L    Absolute Lymphocytes 2.5 0.8 - 5.3 10e9/L    Absolute Monocytes 1.1 0.0 - 1.3 10e9/L    Absolute Eosinophils 0.1 0.0 - 0.7 10e9/L    Absolute Basophils 0.0 0.0 - 0.2 10e9/L    Abs Immature Granulocytes 0.0 0 - 0.4 10e9/L    Absolute Nucleated RBC 0.0    INR   Result Value Ref Range    INR 1.04 0.86 - 1.14   Comprehensive metabolic panel   Result Value Ref Range    Sodium 140 133 - 144 mmol/L    Potassium 3.8 3.4 - 5.3 mmol/L    Chloride 108 94 - 109 mmol/L    Carbon Dioxide 28 20 - 32 mmol/L    Anion Gap 5 3 - 14 mmol/L    Glucose 96 70 - 99 mg/dL    Urea Nitrogen 14 7 - 30 mg/dL    Creatinine 0.67 0.66 - 1.25 mg/dL    GFR Estimate >90 >60 mL/min/[1.73_m2]    GFR Estimate If Black >90 >60 mL/min/[1.73_m2]    Calcium 8.8 8.5 - 10.1 mg/dL    Bilirubin Total 1.0 0.2 - 1.3 mg/dL    Albumin 3.4 3.4 - 5.0 g/dL    Protein Total 6.7 (L) 6.8 - 8.8 g/dL    Alkaline Phosphatase 69 40 - 150 U/L    ALT 40 0 - 70 U/L    AST 19 0 - 45 U/L   CRP inflammation   Result Value Ref Range    CRP Inflammation <2.9 0.0 - 8.0 mg/L     *Note: Due to a large number of results and/or encounters for the requested time period, some results have not been displayed. A complete set of results can be found in Results Review.       Medications   vancomycin (VANCOCIN) 2,000 mg in sodium chloride 0.9 % 500 mL intermittent infusion (2,000 mg Intravenous New Bag 1/30/21 0031)   vancomycin 1500 mg in 0.9% NaCl 250 ml intermittent infusion 1,500 mg (has no administration in time range)   diphenhydrAMINE (BENADRYL) capsule 25 mg ( Oral Canceled Entry 1/30/21 0031)   carvedilol (COREG) tablet 6.25 mg (has no administration in time range)   lidocaine (XYLOCAINE) 2 % 15 mL, alum & mag hydroxide-simethicone (MAALOX) 15 mL GI Cocktail (has no administration in time range)   cefTRIAXone (ROCEPHIN) 2 g vial to attach to  ml bag for ADULTS or NS 50 ml bag for PEDS (0  g Intravenous Stopped 1/30/21 0103)   diphenhydrAMINE (BENADRYL) injection 25 mg (25 mg Intravenous Given 1/29/21 2335)   oxyCODONE (ROXICODONE) solution 10 mg (10 mg Oral Given 1/29/21 2335)        Assessments & Plan (with Medical Decision Making)   Impression:  Middle aged male with short bowel syndrome on chronic home TPN presents with 3 days of fever, chills, and positive blood culture. The blood culture is preliminarily growing enterococcus and non aureus staph species. The enterococcus would be a very atypical skin contaminant and he does have systemic symptoms of infection. He has had the current azevedo in since last August. He has had past central line infections. Tonight blood cultures were repeated. His vital signs are stable and he is afebrile. CBC is normal and CRP is normal. It remains unclear if the prior culture is indicative of central line infection or is a contaminant at this point. He was treated with IV ceftriaxone and vancomycin in the ED after obtaining culture. He will be admitted to internal medicine. He may need central line placement. If catheter change is needed, his ID physician has recommended a silicone line that can tolerate ethanol lock to decrease further line infections.    I have reviewed the nursing notes. I have reviewed the findings, diagnosis, plan and need for follow up with the patient.    New Prescriptions    No medications on file       Final diagnoses:   Positive blood culture   Bloodstream infection associated with central venous catheter, initial encounter   Short bowel syndrome   On total parenteral nutrition       --  Rene Calderon  [facility] EMERGENCY DEPARTMENT  1/29/2021     Rene Calderon MD  01/30/21 0139

## 2021-01-31 LAB
ANION GAP SERPL CALCULATED.3IONS-SCNC: 4 MMOL/L (ref 3–14)
BUN SERPL-MCNC: 18 MG/DL (ref 7–30)
CALCIUM SERPL-MCNC: 8.9 MG/DL (ref 8.5–10.1)
CHLORIDE SERPL-SCNC: 105 MMOL/L (ref 94–109)
CO2 SERPL-SCNC: 30 MMOL/L (ref 20–32)
CREAT SERPL-MCNC: 0.82 MG/DL (ref 0.66–1.25)
ERYTHROCYTE [DISTWIDTH] IN BLOOD BY AUTOMATED COUNT: 20.6 % (ref 10–15)
GFR SERPL CREATININE-BSD FRML MDRD: >90 ML/MIN/{1.73_M2}
GLUCOSE SERPL-MCNC: 91 MG/DL (ref 70–99)
HCT VFR BLD AUTO: 36.5 % (ref 40–53)
HGB BLD-MCNC: 11.4 G/DL (ref 13.3–17.7)
MCH RBC QN AUTO: 28.1 PG (ref 26.5–33)
MCHC RBC AUTO-ENTMCNC: 31.2 G/DL (ref 31.5–36.5)
MCV RBC AUTO: 90 FL (ref 78–100)
PLATELET # BLD AUTO: 226 10E9/L (ref 150–450)
POTASSIUM SERPL-SCNC: 3.7 MMOL/L (ref 3.4–5.3)
RBC # BLD AUTO: 4.06 10E12/L (ref 4.4–5.9)
SODIUM SERPL-SCNC: 138 MMOL/L (ref 133–144)
VANCOMYCIN SERPL-MCNC: 13 MG/L
WBC # BLD AUTO: 8.8 10E9/L (ref 4–11)

## 2021-01-31 PROCEDURE — 258N000003 HC RX IP 258 OP 636: Performed by: HOSPITALIST

## 2021-01-31 PROCEDURE — 250N000011 HC RX IP 250 OP 636: Performed by: HOSPITALIST

## 2021-01-31 PROCEDURE — 250N000011 HC RX IP 250 OP 636: Performed by: INTERNAL MEDICINE

## 2021-01-31 PROCEDURE — 258N000003 HC RX IP 258 OP 636: Performed by: INTERNAL MEDICINE

## 2021-01-31 PROCEDURE — 120N000002 HC R&B MED SURG/OB UMMC

## 2021-01-31 PROCEDURE — 999N001064 HC STATISTIC DRUG SCREEN MULTIPLE (METRO): Performed by: HOSPITALIST

## 2021-01-31 PROCEDURE — 250N000013 HC RX MED GY IP 250 OP 250 PS 637: Performed by: INTERNAL MEDICINE

## 2021-01-31 PROCEDURE — 36592 COLLECT BLOOD FROM PICC: CPT | Performed by: INTERNAL MEDICINE

## 2021-01-31 PROCEDURE — 250N000013 HC RX MED GY IP 250 OP 250 PS 637: Performed by: STUDENT IN AN ORGANIZED HEALTH CARE EDUCATION/TRAINING PROGRAM

## 2021-01-31 PROCEDURE — 36415 COLL VENOUS BLD VENIPUNCTURE: CPT | Performed by: HOSPITALIST

## 2021-01-31 PROCEDURE — 80307 DRUG TEST PRSMV CHEM ANLYZR: CPT | Performed by: HOSPITALIST

## 2021-01-31 PROCEDURE — 250N000011 HC RX IP 250 OP 636: Performed by: NURSE PRACTITIONER

## 2021-01-31 PROCEDURE — 80202 ASSAY OF VANCOMYCIN: CPT | Performed by: HOSPITALIST

## 2021-01-31 PROCEDURE — 99233 SBSQ HOSP IP/OBS HIGH 50: CPT | Mod: GC | Performed by: INTERNAL MEDICINE

## 2021-01-31 PROCEDURE — 85027 COMPLETE CBC AUTOMATED: CPT | Performed by: INTERNAL MEDICINE

## 2021-01-31 PROCEDURE — 80048 BASIC METABOLIC PNL TOTAL CA: CPT | Performed by: INTERNAL MEDICINE

## 2021-01-31 RX ORDER — HYDROXYZINE HYDROCHLORIDE 25 MG/1
25 TABLET, FILM COATED ORAL EVERY 6 HOURS PRN
Status: DISCONTINUED | OUTPATIENT
Start: 2021-01-31 | End: 2021-02-05 | Stop reason: HOSPADM

## 2021-01-31 RX ORDER — HYDROXYZINE HYDROCHLORIDE 25 MG/1
50 TABLET, FILM COATED ORAL EVERY 6 HOURS PRN
Status: DISCONTINUED | OUTPATIENT
Start: 2021-01-31 | End: 2021-02-05 | Stop reason: HOSPADM

## 2021-01-31 RX ORDER — QUETIAPINE FUMARATE 50 MG/1
50 TABLET, FILM COATED ORAL
Status: DISCONTINUED | OUTPATIENT
Start: 2021-01-31 | End: 2021-02-05 | Stop reason: HOSPADM

## 2021-01-31 RX ADMIN — OXYCODONE HYDROCHLORIDE 10 MG: 5 SOLUTION ORAL at 20:30

## 2021-01-31 RX ADMIN — Medication 1 MG: at 20:30

## 2021-01-31 RX ADMIN — DEXTROAMPHETAMINE SACCHARATE, AMPHETAMINE ASPARTATE, DEXTROAMPHETAMINE SULFATE AND AMPHETAMINE SULFATE 20 MG: 2.5; 2.5; 2.5; 2.5 TABLET ORAL at 08:40

## 2021-01-31 RX ADMIN — QUETIAPINE FUMARATE 50 MG: 50 TABLET ORAL at 20:38

## 2021-01-31 RX ADMIN — SODIUM CHLORIDE, PRESERVATIVE FREE 5 ML: 5 INJECTION INTRAVENOUS at 06:32

## 2021-01-31 RX ADMIN — CARVEDILOL 6.25 MG: 6.25 TABLET, FILM COATED ORAL at 08:40

## 2021-01-31 RX ADMIN — Medication 5 ML: at 18:10

## 2021-01-31 RX ADMIN — SUCRALFATE 1 G: 1 SUSPENSION ORAL at 08:30

## 2021-01-31 RX ADMIN — NICOTINE 1 PATCH: 14 PATCH, EXTENDED RELEASE TRANSDERMAL at 08:40

## 2021-01-31 RX ADMIN — DIPHENHYDRAMINE HYDROCHLORIDE 25 MG: 25 SOLUTION ORAL at 02:35

## 2021-01-31 RX ADMIN — HYDROXYZINE HYDROCHLORIDE 50 MG: 25 TABLET, FILM COATED ORAL at 16:17

## 2021-01-31 RX ADMIN — OXYCODONE HYDROCHLORIDE 10 MG: 5 SOLUTION ORAL at 12:24

## 2021-01-31 RX ADMIN — OXYCODONE HYDROCHLORIDE 10 MG: 5 SOLUTION ORAL at 16:18

## 2021-01-31 RX ADMIN — OXYCODONE HYDROCHLORIDE 10 MG: 5 SOLUTION ORAL at 08:40

## 2021-01-31 RX ADMIN — SUCRALFATE 1 G: 1 SUSPENSION ORAL at 12:24

## 2021-01-31 RX ADMIN — OXYCODONE HYDROCHLORIDE 10 MG: 5 SOLUTION ORAL at 04:11

## 2021-01-31 RX ADMIN — VANCOMYCIN HYDROCHLORIDE 1500 MG: 10 INJECTION, POWDER, LYOPHILIZED, FOR SOLUTION INTRAVENOUS at 03:45

## 2021-01-31 RX ADMIN — CARVEDILOL 6.25 MG: 6.25 TABLET, FILM COATED ORAL at 18:10

## 2021-01-31 RX ADMIN — SODIUM CHLORIDE, PRESERVATIVE FREE 5 ML: 5 INJECTION INTRAVENOUS at 20:30

## 2021-01-31 RX ADMIN — DIPHENHYDRAMINE HYDROCHLORIDE 25 MG: 25 SOLUTION ORAL at 15:34

## 2021-01-31 RX ADMIN — VANCOMYCIN HYDROCHLORIDE 1750 MG: 10 INJECTION, POWDER, LYOPHILIZED, FOR SOLUTION INTRAVENOUS at 16:12

## 2021-01-31 RX ADMIN — ONDANSETRON 8 MG: 4 TABLET, ORALLY DISINTEGRATING ORAL at 20:38

## 2021-01-31 ASSESSMENT — ACTIVITIES OF DAILY LIVING (ADL)
ADLS_ACUITY_SCORE: 9
ADLS_ACUITY_SCORE: 9
ADLS_ACUITY_SCORE: 12

## 2021-01-31 NOTE — PHARMACY-ADMISSION MEDICATION HISTORY
Pharmacy Vancomycin Note  Date of Service 2021  Patient's  1970   50 year old, male    Indication: Bacteremia  Goal Trough Level: 15-20 mg/L  Day of Therapy: 2  Current Vancomycin regimen:  1,750 mg IV q12h    Current estimated CrCl = Estimated Creatinine Clearance: 136.8 mL/min (based on SCr of 0.82 mg/dL).    Creatinine for last 3 days  2021: 11:12 PM Creatinine 0.67 mg/dL  2021:  6:39 AM Creatinine 0.82 mg/dL    Recent Vancomycin Levels (past 3 days)  2021: 12:49 PM Vancomycin Level 13.0 mg/L (~9 hr)    Vancomycin IV Administrations (past 72 hours)                   vancomycin 1500 mg in 0.9% NaCl 250 ml intermittent infusion 1,500 mg (mg) 1,500 mg New Bag 21 0345     1,500 mg New Bag 21 1618    vancomycin (VANCOCIN) 2,000 mg in sodium chloride 0.9 % 500 mL intermittent infusion (mg) 2,000 mg New Bag 21 0031                Nephrotoxins and other renal medications (From now, onward)    Start     Dose/Rate Route Frequency Ordered Stop    21 1400  vancomycin (VANCOCIN) 1,750 mg in sodium chloride 0.9 % 500 mL intermittent infusion      1,750 mg  over 2 Hours Intravenous EVERY 12 HOURS 21 1355               Contrast Orders - past 72 hours (72h ago, onward)    None          Interpretation of levels and current regimen:  Trough level is subtherapeutic     Has serum creatinine changed > 50% in last 72 hours: No    Urine output:  unable to determine    Renal Function: Stable    Plan:  1.  Increase dose of to vancomycin 1,750 mg IV every 12 hours.   2.  Pharmacy will check trough levels as appropriate in 1-3 Days.    3. Serum creatinine levels will be ordered daily for the first week of therapy and at least twice weekly for subsequent weeks.      Lisandro Rivas, PharmD         .

## 2021-01-31 NOTE — PROGRESS NOTES
[facility]     Medicine Progress Note - Hospitalist Service, Gold 8       Date of Admission:  1/29/2021  Assessment & Plan       Parker Acevedo is a 50 year old male admitted on 1/29/2021. He is a 49 yo male with PMH significant for Celestino-en-Y gastric bypass, esophagojejunostomy c/b short gut syndrome and chronic malnutrition, and history of recurrent line-related infections, here after 1.5 days of fevers at home, found to have blood cultures positive for E. Faecalis. He is admitted for IV antibiotics. Hemodynamically stable here.    #E. faecalis bacteremia  #Hx of recurrent polymicrobial bacteremia  Has multiple past infections for bactermia/CLABSI. Admitted 7/28/20 with MRSE from Cm CVC (replaced 8/3/2020). Admitted again 8/31/2020 with blood cultures positive for staph lugdunensis. Cm removed and replaced at that August '20 admission. Follows with ID outpatient (Dr. Buckner). He had ~2 days of fever prior to this admission, so ID arranged blood cultures outpatient. Positive for gram positive cocci in clusters in 1/2 samples (unclear if peripheral or from line). Per Dr. Buckner, was started on IV Ceftriaxone and IV Vanco. Has since speciated as E. Faecalis. Cannot give IV ampicillin due to listed allergy (anaphylaxis). After discussion with pharmacy, will treat with IV Linezolid for now. He is hemodynamically stable here.  - ID aware of admission, will place formal consult given recurrent bacteremia  - Was on IV ceftriaxone 2g IV Q24H (note allergy to penicillins, though patient had tolerated ceftriaxone before) and IV vanco (pharmacy to dose)   - Given e faecalis, discussed with pharmacy and will transition to IV linezolid monotherapy. Cannot give IV ampicillin due to penicillin allergy.  - Follow blood cultures  - Consider TTE  - Will not use Cm at present for TPN; may need line replacement  - Per ID, IF patient requires current line to be removed,  home care has been advocating for future line to be one that would tolerate an ethanol lock to decrease future instances of bacteremia  - discussed with Dr. Buckner on 1/30  - discuss again in AM    # Hx Celestino-en-Y gastric bypass, esophagojejunostomy c/b short gut syndrome and chronic malnutrition  # Chronic Abdominal Pain   Has been without G-tube for ~3 months, scheduled to be replaced.  - Continue PTA tylenol, fentanyl, oxycodone  - Holding home TPN  - Continue PTA PRN acetaminophen, fentanyl patch, and oxycodone  - Continue PTA GI cocktail PRN  - Continue PTA PRN sucralfate  - consider replacing G-tube for feeding, venting     #HTN: continue PTA coreg  # Asthma: continue albuterol inhaler  # ADHD: continue adderall   # Anxiety: continue lorazepam prn (for TPN and meds)  # GERD: continue pta PPI   # Normocytic anemia: 12.1 here, at baseline. Monitor  # paranoia and inappropriate behavior. Prn quetiapine. Psych consult     Diet: Regular Diet Adult    DVT Prophylaxis: Low Risk/Ambulatory with no VTE prophylaxis indicated  Sanchez Catheter: not present  Code Status: Full Code           Disposition Plan   Expected discharge: 2 - 3 days, recommended to prior living arrangement once antibiotic plan established.  Entered: Bakari Oliver MD 01/31/2021, 5:18 PM       The patient's care was discussed with the Bedside Nurse and Patient.    Bakari Oliver MD  Hospitalist Service, [address]   Contact information available via [facility] Paging/Directory  Please see sign in/sign out for up to date coverage information  ______________________________________________________________________    Interval History   Pt paranoid, expressing inappropriate behavior. No f/c now. Afraid he'll need another port. Curious about current line and whrether we can use it. No CP. Has sitter due to behavior, see RN note    Data reviewed today: I reviewed all medications, new labs and  imaging results over the last 24 hours. I personally reviewed no images or EKG's today.    Physical Exam   Vital Signs: Temp: 97.9  F (36.6  C) Temp src: Oral BP: (!) 140/89 Pulse: 87   Resp: 16 SpO2: 98 % O2 Device: None (Room air)    Weight: 193 lbs 9.6 oz  General Appearance: Alert, nad, wearing dark sunglasses inside  Respiratory: clear  Cardiovascular: rrr  GI: soft, tender throughout, nabs  Skin: w/d no rash  Other: Not expressing inappropriate thoughts to me. Talking with sitter    Data   Recent Labs   Lab 01/31/21  0639 01/30/21  0619 01/29/21  2312 01/26/21  1230 01/26/21  1230   WBC 8.8 8.9 9.4   < > 5.9   HGB 11.4* 12.4* 12.1*   < > 11.5*   MCV 90 90 89   < > 89    243 239   < > 211   INR  --   --  1.04  --   --      --  140  --  144   POTASSIUM 3.7  --  3.8  --  3.4   CHLORIDE 105  --  108  --  111*   CO2 30  --  28  --  29   BUN 18  --  14  --  8   CR 0.82  --  0.67  --  0.52*   ANIONGAP 4  --  5  --  4   SILAS 8.9  --  8.8  --  8.5   GLC 91  --  96  --  84   ALBUMIN  --   --  3.4  --  3.2*   PROTTOTAL  --   --  6.7*  --  6.4*   BILITOTAL  --   --  1.0  --  0.3   ALKPHOS  --   --  69  --  66   ALT  --   --  40  --  30   AST  --   --  19  --  22    < > = values in this interval not displayed.     No results found for this or any previous visit (from the past 24 hour(s)).  Medications       amphetamine-dextroamphetamine  20 mg Oral Daily     carvedilol  6.25 mg Oral BID w/meals     fentaNYL  25 mcg Transdermal Q72H     fentaNYL   Transdermal Q8H     heparin lock flush  5-10 mL Intracatheter Q24H     nicotine  1 patch Transdermal Daily     nicotine   Transdermal Q8H     vancomycin (VANCOCIN) IV  1,750 mg Intravenous Q12H

## 2021-01-31 NOTE — PROGRESS NOTES
Pt has been up walking halls, taking supplies from unit. Speaking in tangential sentences. Wrote inappropriate sexual phrases on white board. When confronted by staff, pt denied behaviors. Pt was instructed that he would need to be quiet in the room and stop disturbing his roommate as it was the middle of the night. Pt stated that he understood and would cooperate. When writer left room, pt was standing at the foot of bed staring at the wall.

## 2021-01-31 NOTE — PROVIDER NOTIFICATION
Ronnie hannon cross-cover notified at #[id number] that pt has been sexually inappropriate to staff, found digging in supply cart and in roommate's belongings, disoriented to place/time. RN noted pupils are dilated. Attendant sitting with pt now. Wondering whether a urine drug tox screen would be appropriate. Awaiting response from MD.

## 2021-02-01 ENCOUNTER — TRANSFERRED RECORDS (OUTPATIENT)
Dept: HEALTH INFORMATION MANAGEMENT | Facility: CLINIC | Age: 51
End: 2021-02-01

## 2021-02-01 ENCOUNTER — PATIENT OUTREACH (OUTPATIENT)
Dept: CARE COORDINATION | Facility: CLINIC | Age: 51
End: 2021-02-01

## 2021-02-01 LAB
ACETAMINOPHEN QUAL: NEGATIVE
AMANTADINE: NEGATIVE
AMITRIPTYLINE QUAL: NEGATIVE
AMOXAPINE: NEGATIVE
AMPHETAMINES QUAL: POSITIVE
ATROPINE: NEGATIVE
BENZODIAZ UR QL: NEGATIVE
BUPROPION QUAL: NEGATIVE
CAFFEINE QUAL: POSITIVE
CANNABINOIDS UR QL SCN: NEGATIVE
CARBAMAZEPINE QUAL: NEGATIVE
CHLORPHENIRAMINE: NEGATIVE
CHLORPROMAZINE: NEGATIVE
CITALOPRAM QUAL: NEGATIVE
CLOMIPRAMINE QUAL: NEGATIVE
COCAINE QUAL: NEGATIVE
COCAINE UR QL: NEGATIVE
CODEINE QUAL: ABNORMAL
CREAT SERPL-MCNC: 0.73 MG/DL (ref 0.66–1.25)
DESIPRAMINE QUAL: NEGATIVE
DEXTROMETHORPHAN: NEGATIVE
DIPHENHYDRAMINE: POSITIVE
DOXEPIN/METABOLITE: NEGATIVE
DOXYLAMINE: NEGATIVE
EPHEDRINE OR PSEUDO: NEGATIVE
FENTANYL QUAL: NEGATIVE
FLUOXETINE AND METAB: NEGATIVE
GFR SERPL CREATININE-BSD FRML MDRD: >90 ML/MIN/{1.73_M2}
HYDROCODONE QUAL: ABNORMAL
HYDROMORPHONE QUAL: ABNORMAL
IBUPROFEN QUAL: NEGATIVE
IMIPRAMINE QUAL: NEGATIVE
KETAMINE QUAL: NEGATIVE
LAMOTRIGINE QUAL: NEGATIVE
LIDOCAIN SPEC QL: NEGATIVE
LOXAPINE: NEGATIVE
MAPROTYLINE: NEGATIVE
MDMA QUAL: NEGATIVE
MEPERIDINE QUAL: NEGATIVE
METHAMPHETAMINE: NEGATIVE
METHODONE QUAL: NEGATIVE
MIRTAZAPINE QUAL: NEGATIVE
MORPHINE QUAL: ABNORMAL
NICOTINE: POSITIVE
NORTRIPTYLINE QUAL: NEGATIVE
OLANZAPINE QUAL: NEGATIVE
OPIATES UR QL SCN: POSITIVE
OXYCODONE QUAL: ABNORMAL
PENTAZOCINE: NEGATIVE
PHENCYCLIDINE QUAL: NEGATIVE
PHENTERMINE: NEGATIVE
PROPOFOL QUAL: NEGATIVE
PROPOXPHENE QUAL: NEGATIVE
PROPRANOLOL QUAL: NEGATIVE
PYRILAMINE: NEGATIVE
QUETIAPINE METAB QUAL: NEGATIVE
SALICYLATE QUAL: NEGATIVE
SERTRALINE QUAL: NEGATIVE
THEOBROMINE: POSITIVE
TOPIRAMATE QUAL: NEGATIVE
TRAMADOL QUAL: NEGATIVE
TRIMIPRAMINE QUAL: NEGATIVE
VENLAFAXINE QUAL: NEGATIVE

## 2021-02-01 PROCEDURE — 82565 ASSAY OF CREATININE: CPT | Performed by: INTERNAL MEDICINE

## 2021-02-01 PROCEDURE — 250N000013 HC RX MED GY IP 250 OP 250 PS 637: Performed by: STUDENT IN AN ORGANIZED HEALTH CARE EDUCATION/TRAINING PROGRAM

## 2021-02-01 PROCEDURE — 99207 PR CDG-CODE CATEGORY CHANGED: CPT | Performed by: PSYCHIATRY & NEUROLOGY

## 2021-02-01 PROCEDURE — 120N000002 HC R&B MED SURG/OB UMMC

## 2021-02-01 PROCEDURE — 250N000011 HC RX IP 250 OP 636: Performed by: NURSE PRACTITIONER

## 2021-02-01 PROCEDURE — 258N000003 HC RX IP 258 OP 636: Performed by: INTERNAL MEDICINE

## 2021-02-01 PROCEDURE — 250N000013 HC RX MED GY IP 250 OP 250 PS 637: Performed by: INTERNAL MEDICINE

## 2021-02-01 PROCEDURE — 99222 1ST HOSP IP/OBS MODERATE 55: CPT | Performed by: PSYCHIATRY & NEUROLOGY

## 2021-02-01 PROCEDURE — 250N000011 HC RX IP 250 OP 636: Performed by: INTERNAL MEDICINE

## 2021-02-01 PROCEDURE — 36415 COLL VENOUS BLD VENIPUNCTURE: CPT | Performed by: INTERNAL MEDICINE

## 2021-02-01 PROCEDURE — 99207 PR CDG-CUT & PASTE-POTENTIAL IMPACT ON LEVEL: CPT | Performed by: INTERNAL MEDICINE

## 2021-02-01 PROCEDURE — 99233 SBSQ HOSP IP/OBS HIGH 50: CPT | Performed by: INTERNAL MEDICINE

## 2021-02-01 RX ORDER — DEXTROSE MONOHYDRATE, SODIUM CHLORIDE, SODIUM LACTATE, POTASSIUM CHLORIDE, CALCIUM CHLORIDE 5; 600; 310; 179; 20 G/100ML; MG/100ML; MG/100ML; MG/100ML; MG/100ML
INJECTION, SOLUTION INTRAVENOUS CONTINUOUS
Status: DISCONTINUED | OUTPATIENT
Start: 2021-02-01 | End: 2021-02-02

## 2021-02-01 RX ADMIN — DIPHENHYDRAMINE HYDROCHLORIDE 25 MG: 25 SOLUTION ORAL at 14:22

## 2021-02-01 RX ADMIN — DIPHENHYDRAMINE HYDROCHLORIDE 25 MG: 25 SOLUTION ORAL at 02:52

## 2021-02-01 RX ADMIN — VANCOMYCIN HYDROCHLORIDE 1750 MG: 10 INJECTION, POWDER, LYOPHILIZED, FOR SOLUTION INTRAVENOUS at 03:33

## 2021-02-01 RX ADMIN — Medication 1 MG: at 22:34

## 2021-02-01 RX ADMIN — DEXTROAMPHETAMINE SACCHARATE, AMPHETAMINE ASPARTATE, DEXTROAMPHETAMINE SULFATE AND AMPHETAMINE SULFATE 20 MG: 2.5; 2.5; 2.5; 2.5 TABLET ORAL at 08:25

## 2021-02-01 RX ADMIN — QUETIAPINE FUMARATE 50 MG: 50 TABLET ORAL at 21:36

## 2021-02-01 RX ADMIN — OXYCODONE HYDROCHLORIDE 10 MG: 5 SOLUTION ORAL at 08:25

## 2021-02-01 RX ADMIN — OXYCODONE HYDROCHLORIDE 10 MG: 5 SOLUTION ORAL at 16:38

## 2021-02-01 RX ADMIN — SUCRALFATE 1 G: 1 SUSPENSION ORAL at 21:17

## 2021-02-01 RX ADMIN — OXYCODONE HYDROCHLORIDE 10 MG: 5 SOLUTION ORAL at 12:31

## 2021-02-01 RX ADMIN — VANCOMYCIN HYDROCHLORIDE 1750 MG: 10 INJECTION, POWDER, LYOPHILIZED, FOR SOLUTION INTRAVENOUS at 15:19

## 2021-02-01 RX ADMIN — SUCRALFATE 1 G: 1 SUSPENSION ORAL at 10:25

## 2021-02-01 RX ADMIN — SODIUM CHLORIDE, PRESERVATIVE FREE 5 ML: 5 INJECTION INTRAVENOUS at 08:32

## 2021-02-01 RX ADMIN — Medication 5 ML: at 21:24

## 2021-02-01 RX ADMIN — CARVEDILOL 6.25 MG: 6.25 TABLET, FILM COATED ORAL at 21:13

## 2021-02-01 RX ADMIN — OXYCODONE HYDROCHLORIDE 10 MG: 5 SOLUTION ORAL at 21:13

## 2021-02-01 RX ADMIN — CARVEDILOL 6.25 MG: 6.25 TABLET, FILM COATED ORAL at 08:25

## 2021-02-01 RX ADMIN — NICOTINE 1 PATCH: 14 PATCH, EXTENDED RELEASE TRANSDERMAL at 08:25

## 2021-02-01 RX ADMIN — ONDANSETRON 8 MG: 4 TABLET, ORALLY DISINTEGRATING ORAL at 15:23

## 2021-02-01 ASSESSMENT — ACTIVITIES OF DAILY LIVING (ADL)
ADLS_ACUITY_SCORE: 12
ADLS_ACUITY_SCORE: 11
DEPENDENT_IADLS:: MEDICATION MANAGEMENT;SHOPPING;TRANSPORTATION
ADLS_ACUITY_SCORE: 11
ADLS_ACUITY_SCORE: 12
ADLS_ACUITY_SCORE: 11
ADLS_ACUITY_SCORE: 12

## 2021-02-01 NOTE — PROGRESS NOTES
CLINICAL NUTRITION SERVICES - ASSESSMENT NOTE     Nutrition Prescription    RECOMMENDATIONS FOR MDs/PROVIDERS TO ORDER:  - Once able to restart TPN, please send pharmacy/Nutrition consult to manage TPN   - Lyte Monitoring with TPN start and advancement, replace as needed.      Malnutrition Status:    Non-severe malnutrition in the context of acute on chronic illness    Recommendations already ordered by Registered Dietitian (RD):  None at this time    Future/Additional Recommendations:  Once PICC access appropriate to use, begin TPN:    --Dosing wt = 76.4 kg (TPN dosing wt ).      --TPN with Goal volume of 1000 ml/day at continuous rate to start, with 150 gm Dex daily, 100 gm AA daily, and 100 ml 20% IV lipids 4 days /week.     --(Regimen provides average daily 1024 kcals (13 kcals/kg + pending PO), 1.3 gm PRO/kg, 150 gm dextrose with GIR 1.4  mg/kg/min and 11% kcals from Fat.      --Once tolerated TPN without sign of refeeding x 24 hours begin 14 hour overnight cycle per home regimen.      REASON FOR ASSESSMENT  Parker Acevedo is a/an 50 year old male assessed by the dietitian for Admission Nutrition Risk Screen for malnutrition screening tool score >/=2    PMH:  - Hx Celestino-en-Y gastric bypass, esophagojejunostomy c/b short gut syndrome and chronic malnutrition    NUTRITION HISTORY  - Pt on home TPN, currently on hold due to bacteremia. Plan to restart once infection treated. Pt confirms TPN dependent and regimen below is still active.  - Per chart, pt has been without G-tube for ~3 months, scheduled to be replaced? Pt does not think G-tube will be replaced  - Pt denied constipation or diarrhea (for past 2 weeks). Confirms N/V comes and goes, but right now is not having any issues.  When nausea occurs at home, takes Zofran.  - Pt reports oral intake is ok right now. States some days he can tolerate food, other days he can't.    Obtained from chart review:   --On chronic TPN at home for the past 4-5 years due  "to history of Gastric bypass. Has persistent Nausea.      TPN regimen: Obtained  from [facility] over the last admission:   Runs Two bags (PN + lipids M/W/F) and (PN without lipids T/TH/S/Alicea)     Bag # 1 (PN with lipids M/W/F):  Dosing wt: 76.4 kg, Volume 1100 ml, 14 ours cycle, 100 ml overfield   Dextrose 150 gm,  gms with 125 ml 20% intralipid's  MTE-5 1ml/day, infuvite 10 ml daily      - Goal TPN Provides: 1160 kcals/day (15 kcal/kg/day + Pending PO calories ), 1.3 gm PRO/kg/day, 150 gm CHO/day, GIR: 2.74  mg/kg/min peak infusion and 22% of kcals from IV lipids.      Bag #2 (PN without Lipids T/Th/Sa/Alicea)  Dosing wt: 76.4 kg, Volume 1000 ml, 14 hour cycle + 100 ml over field   Dextrose 150 gm,  gms, No lipids   - Goal TPN Provides: 910 kcals/day (12 kcal/kg/day + Pending PO calories ), 1.3 gm PRO/kg/day, 150 gm CHO/day, GIR: 2.74  mg/kg/min peak infusion and no fat contribution.     CURRENT NUTRITION ORDERS  Diet: Regular  Intake/Tolerance: Per flowsheet, % of 2 meals.    LABS  K, Mg, Phos - WNL       MEDICATIONS  Vancomycin    ANTHROPOMETRICS  Height: 181.6 cm (5' 11.5\")  Most Recent Weight: 87.8 kg (193 lb 9.6 oz)    IBW: 81 kg  BMI: Overweight BMI 25-29.9  Weight History: 11# (5%) wt loss over past 6 months  Wt Readings from Last 10 Encounters:   01/30/21 87.8 kg (193 lb 9.6 oz)   11/09/20 87.1 kg (192 lb)   09/03/20 90.4 kg (199 lb 3.2 oz)   08/27/20 92.9 kg (204 lb 14.4 oz)   08/02/20 93.8 kg (206 lb 14.4 oz)   04/14/20 82.1 kg (181 lb)   04/07/20 97.3 kg (214 lb 9.6 oz)   03/26/20 90.3 kg (199 lb)   02/21/20 78.9 kg (174 lb)   12/19/19 86.6 kg (191 lb)     Dosing Weight: Will continue with TPN dosing wt of 76 kg      ASSESSED NUTRITION NEEDS  Estimated Energy Needs: 4576-3740 kcals/day (20 - 25 kcals/kg)  Justification: Modest needs with TPN   Estimated Protein Needs:  grams protein/day (1.2 - 1.5 grams of pro/kg)  Justification: Repletion  Estimated Fluid Needs:History " of Cardiomyopathy with reduced EF.  Justification: Per provider pending fluid status    PHYSICAL FINDINGS  See malnutrition section below.    MALNUTRITION  % Intake: Decreased intake does not meet criteria - TPN off for past 3 days  % Weight Loss: Up to 10% in 6 months (non-severe)  Subcutaneous Fat Loss: Facial region:  mild  Muscle Loss: Temporal:  mild and Thoracic region (clavicle):  Moderate   Fluid Accumulation/Edema: Mild  Malnutrition Diagnosis: Non-severe malnutrition in the context of acute on chronic illness    NUTRITION DIAGNOSIS  Inadequate parenteral nutrition infusion related to Bacteremia as evidenced by TPN dependent and now is on hold for past 3 days and pending ability to restart TPN      INTERVENTIONS  Implementation  Nutrition Education: Provided education of role of RD in nutrition POC, TPN restart   Parenteral Nutrition/IV Fluids - Home recs above     Goals  Total avg nutritional intake to meet a minimum of 20 kcal/kg and 1.2 g PRO/kg daily (per dosing wt 76 kg).     Monitoring/Evaluation  Progress toward goals will be monitored and evaluated per protocol.    Jyothi Jain RDN, LD  6A/7D Pager [phone]

## 2021-02-01 NOTE — CONSULTS
"          Initial Psychiatric Consult   Consult date: February 1, 2021         Reason for Consult, requesting source:    AMS, etc   Requesting source: Bakari Oliver        HPI:   Per H and P 1/30:  \"Parker Acevedo is a 50 year old male admitted on 1/29/2021. He is a 49 yo male with PMH significant for Celestino-en-Y gastric bypass, esophagojejunostomy c/b short gut syndrome and chronic malnutrition, and history of recurrent line-related infections, here after 1.5 days of fevers at home, found to have blood cultures positive for E. Faecalis. He is admitted for IV antibiotics. Hemodynamically stable here.\"    Psychiatry was consulted in regards to some unusual behaviors he exhibited soon after admission.  He seemed to be quite altered, was planned some dressings and manipulating his IV to the point where a bedside attendant was ordered.  Also he had apparently written something inappropriate on the white board. Utox was checked; neg except for opioids.  In talking with him today he mentions sometimes he does get confused if he does not get enough sleep or if his TPN is off.  Denies any problems with depression, but does occasionally have sleep difficulties.  He also has quite a bit of anxiety for which she takes 1 Ativan a day.  Denies visual hallucinations or paranoia.  He is also taking Adderall for presumed ADHD.   He also receives oxycodone from Dr. Milligan at the pain clinic.  He may be restricted to [place] and [facility].  Seroquel 50 mg HS was prescribed, which really helped.     I had an opportunity to speak with his wife and she verified that he will commonly become quite confused when he is dehydrated, or when TPN rate is off, or he has poor sleep.  Sometimes she gives him an extra Ativan when he becomes confused seems to help.  She mentions that when she spoke to her last night he was quite confused, but seemed to have cleared up when she spoke with him today.        Past Psychiatric " History:   No psychiatric admissions. Has tried Prozac, but made BP go up (??), and trazodone; doesn't recall if it helped sleep.   Has been on Adderall for many years for presumed ADHD.  He mentions that he will be very disorganized and hyperactive if he is not taking it.        Substance Use and History:   He denies use of drugs, minimal alcohol, smokes cigarettes         Past Medical History:   PAST MEDICAL HISTORY:   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     Complication of anesthesia      Difficulty swallowing      Gastric ulcer, unspecified as acute or chronic, without mention of hemorrhage, perforation, or obstruction      Gastro-oesophageal reflux disease      Head injury      Hiatal hernia      Other bladder disorder      Other chronic pain      PONV (postoperative nausea and vomiting)      Severe malnutrition (H)     TPN     Short gut syndrome      Tobacco abuse        PAST SURGICAL HISTORY:   Past Surgical History:   Procedure Laterality Date     AMPUTATION       APPENDECTOMY       BACK SURGERY  11/3/2014    curve in the spine     BIOPSY LYMPH NODE CERVICAL N/A 2/20/2015    Procedure: BIOPSY LYMPH NODE CERVICAL;  Surgeon: Baron Scanlon MD;  Location:  OR     C GASTRIC BYPASS,OBESE<100CM SHAYLEE-EN-Y  2002    lost 300 pounds     CHOLECYSTECTOMY       DISCECTOMY, FUSION CERVICAL ANTERIOR ONE LEVEL, COMBINED N/A 2/15/2017    Procedure: COMBINED DISCECTOMY, FUSION CERVICAL ANTERIOR ONE LEVEL;  Surgeon: Darren Campos MD;  Location: [facility] OR     ENDOSCOPIC INSERTION TUBE GASTROSTOMY  9/9/2013    Procedure: ENDOSCOPIC INSERTION TUBE GASTROSTOMY;;  Surgeon: Francis Vyas MD;  [facility]     ENDOSCOPIC ULTRASOUND UPPER  GASTROINTESTINAL TRACT (GI)  4/29/2011    Procedure:ENDOSCOPIC ULTRASOUND UPPER GASTROINTESTINAL TRACT (GI); Both Procedures done Conjointly; Surgeon:NEREIDA HOUSER; Location:[facility] OR     ENDOSCOPIC ULTRASOUND UPPER GASTROINTESTINAL TRACT (GI)  9/9/2013    Procedure: ENDOSCOPIC ULTRASOUND UPPER GASTROINTESTINAL TRACT (GI);  Endoscopic Ultrasound Guide Gastrostomy Tube Placement  C-arm;  Surgeon: Noe Lizarraga MD;  [facility] OR     ENDOSCOPY  03/25/11    EGD, [facility]     ENDOSCOPY  08/04/09    Upper Endoscopy, [facility]     ENDOSCOPY  01/05/09    Upper Endoscopy, [facility]     ESOPHAGOSCOPY, GASTROSCOPY, DUODENOSCOPY (EGD), COMBINED  4/20/2011    Procedure:COMBINED ESOPHAGOSCOPY, GASTROSCOPY, DUODENOSCOPY (EGD); Surgeon:FRANCIS VYAS; Location[facility] GI     ESOPHAGOSCOPY, GASTROSCOPY, DUODENOSCOPY (EGD), COMBINED  6/15/2011    Procedure:COMBINED ESOPHAGOSCOPY, GASTROSCOPY, DUODENOSCOPY (EGD); Surgeon:FRANCIS VYAS; [facility]     ESOPHAGOSCOPY, GASTROSCOPY, DUODENOSCOPY (EGD), COMBINED  6/12/2013    Procedure: COMBINED ESOPHAGOSCOPY, GASTROSCOPY, DUODENOSCOPY (EGD);;  Surgeon: Francis Vyas MD;  [facility]     ESOPHAGOSCOPY, GASTROSCOPY, DUODENOSCOPY (EGD), COMBINED  11/22/2013    Procedure: COMBINED ESOPHAGOSCOPY, GASTROSCOPY, DUODENOSCOPY (EGD);;  Surgeon: Francis Vyas MD;  [facility]     ESOPHAGOSCOPY, GASTROSCOPY, DUODENOSCOPY (EGD), COMBINED  4/30/2014    Procedure: COMBINED ESOPHAGOSCOPY, GASTROSCOPY, DUODENOSCOPY (EGD);  Surgeon: Francis Vyas MD;  [facility]     ESOPHAGOSCOPY, GASTROSCOPY, DUODENOSCOPY (EGD), COMBINED N/A 2/20/2015    Procedure: COMBINED ESOPHAGOSCOPY, GASTROSCOPY, DUODENOSCOPY (EGD), BIOPSY SINGLE OR MULTIPLE;  Surgeon: Baron Scanlon MD;  Location:  OR     ESOPHAGOSCOPY, GASTROSCOPY, DUODENOSCOPY (EGD), COMBINED N/A 9/30/2015    Procedure: COMBINED ESOPHAGOSCOPY, GASTROSCOPY, DUODENOSCOPY  (EGD);  Surgeon: Francis Vyas MD;  Location:  GI     ESOPHAGOSCOPY, GASTROSCOPY, DUODENOSCOPY (EGD), COMBINED N/A 10/3/2019    Procedure: Upper Endoscopy;  Surgeon: Clif Morrow MD;  [facility]     GASTRECTOMY  6/22/2012    Procedure: GASTRECTOMY;  Open Approach, Excise Ulcers,Partial Gastrectomy, Esophagojejunostomy, Hiatal Hernia Repair, Extensive Lysis of Adhesions and Esaphagogastrodudenoscopy.;  Surgeon: Francis Vyas MD;  [facility]     GASTROJEJUNOSTOMY  08/26/09    Extensice enterolysis, partial resect. jejunum, part. resect gastric pouch, gastrojejunostomy anastomosis     HC ESOPH/GAS REFLUX TEST W NASAL IMPED ELECTRODE  8/5/2013    Procedure: ESOPHAGEAL IMPEDENCE FUNCTION TEST 1 HOUR OR LESS;  Surgeon: Halie Lang MD;  Location:  GI     HEAD & NECK SURGERY  2/15/2017    C5-C6     HERNIA REPAIR  2006    Umbilical hernia     HERNIORRHAPHY HIATAL  6/22/2012    Procedure: HERNIORRHAPHY HIATAL;;  Surgeon: Francis Vyas MD;  [facility]     HERNIORRHAPHY INGUINAL  11/22/2013    Procedure: HERNIORRHAPHY INGUINAL;;  Surgeon: Francis Vyas MD;  [facility]     INSERT PICC LINE Right 12/19/2019    Procedure: Picc Placement;  Surgeon: Per Dumont PA-C;  [facility]     INSERT PICC LINE Right 2/21/2020    Procedure: INSERTION, PICC;  Surgeon: Per Dumont PA-C;  [facility]     INSERT PORT VASCULAR ACCESS Right 12/19/2017    Procedure: INSERT PORT VASCULAR ACCESS;  Right Chest Port Placement ;  Surgeon: Lisandro Alejandro PA-C;  [facility]     INSERT PORT VASCULAR ACCESS Right 8/2/2018    Procedure: INSERT PORT VASCULAR ACCESS;  Place single lumen tunneled central venous access catheter;  Surgeon: Guy Jamil PA-C;  [facility]     IR CVC TUNNEL PLACEMENT > 5 YRS OF AGE  8/7/2019     IR CVC TUNNEL PLACEMENT > 5 YRS OF AGE  4/14/2020     IR CVC TUNNEL PLACEMENT > 5 YRS OF AGE  8/3/2020     IR CVC  TUNNEL PLACEMENT > 5 YRS OF AGE  9/4/2020     IR CVC TUNNEL REMOVAL RIGHT  10/1/2019     IR CVC TUNNEL REMOVAL RIGHT  7/30/2020     IR CVC TUNNEL REMOVAL RIGHT  9/2/2020     IR FOLLOW UP VISIT OUTPATIENT  8/7/2019     IR PICC PLACEMENT > 5 YRS OF AGE  3/7/2019     IR PICC PLACEMENT > 5 YRS OF AGE  12/19/2019     IR PICC PLACEMENT > 5 YRS OF AGE  2/21/2020     LAPAROTOMY EXPLORATORY  11/22/2013    Procedure: LAPAROTOMY EXPLORATORY;  Exploratory Laparotomy, Upper Endoscopy, Left Inguinal Hernia Repair;  Surgeon: Francis Vyas MD;  [facility]     ORTHOPEDIC SURGERY       PICC INSERTION Right 03/16/2017    5fr DL BioFlo PICC, 42cm (3cm external) in the R medial brachial vein w/ tip in the SVC RA junction.     PICC INSERTION Left 09/23/2017    5fr DL BioFlo PICC, 45cm (1cm external) in the L basilic vein w/ tip in the SVC RA junction.     PICC INSERTION Right 05/16/2019    5Fr - 43cm, Medial brachial vein, low SVC     PICC INSERTION Right 10/02/2019    5Fr - 43cm (2cm external), basilic vein, low SVC     SHAYLEE EN Y BOWEL  2003     SOFT TISSUE SURGERY       THORACIC SURGERY       TONSILLECTOMY       TRANSESOPHAGEAL ECHOCARDIOGRAM INTRAOPERATIVE N/A 1/8/2019    Procedure: TRANSESOPHAGEAL ECHOCARDIOGRAM INTRAOPERATIVE;  Surgeon: GENERIC ANESTHESIA PROVIDER;  Location[facility]             Family History:   FAMILY HISTORY:   Family History   Problem Relation Age of Onset     Gastrointestinal Disease Mother         Crohns disease     Anxiety Disorder Mother      Thyroid Disease Mother         Grave's disease     Cancer Father         ear cancer-skin cancer/melanoma     Breast Cancer Maternal Grandmother      Macular Degeneration Maternal Grandfather      Anxiety Disorder Sister      Diabetes Maternal Uncle      Breast Cancer Other      Hypertension No family hx of      Hyperlipidemia No family hx of      Cerebrovascular Disease No family hx of      Prostate Cancer No family hx of      Depression No family hx of       Anesthesia Reaction No family hx of      Asthma No family hx of      Osteoporosis No family hx of      Genetic Disorder No family hx of      Obesity No family hx of      Mental Illness No family hx of      Substance Abuse No family hx of      Glaucoma No family hx of      He said was negative for psychiatric or substance use problems.        Social History:   SOCIAL HISTORY:   Social History     Tobacco Use     Smoking status: Current Some Day Smoker     Packs/day: 1.00     Years: 6.00     Pack years: 6.00     Types: Cigarettes     Smokeless tobacco: Never Used   Substance Use Topics     Alcohol use: No     Frequency: Never     Drinks per session: Patient refused     Binge frequency: Never     Comment: quit 2002       Grew up in [place] with 3 siblings in an intact family.  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  No history of legal problems.  He has not worked in about 7 years due to health problems.  He lives with his wife and 20-year-old son.         Physical ROS:   The 10 point Review of Systems is negative other than noted in the HPI or here.           Medications:     Medications Prior to Admission   Medication Sig Dispense Refill Last Dose     acetaminophen (TYLENOL) 32 mg/mL liquid Take 15.65 mLs (500 mg) by mouth every 4 hours as needed for fever or mild pain 455 mL 1 Past Month at Unknown time     amphetamine-dextroamphetamine (ADDERALL) 20 MG tablet TAKE ONE TABLET BY MOUTH ONCE DAILY 30 tablet 0 1/30/2021 at Unknown time     carvedilol (COREG) 6.25 MG tablet Take 6.25 mg by mouth 2 times daily (with meals)   1/29/2021 at Unknown time     cyanocobalamin (CYANOCOBALAMIN) 1000 MCG/ML injection Inject 1 mL (1,000 mcg) into the muscle every 30 days 1 mL 11 Past Month at Unknown time     fentaNYL (DURAGESIC) 25 mcg/hr 72 hr patch Place 1 patch onto the skin every 48 hours remove old patch.   May fill 02/05/21 and start 02/07/21 15 patch 0 1/30/2021 at  Unknown time     lidocaine 7.5 mL, alum & mag hydroxide-simethicone 7.5 mL GI Cocktail Take 15 mLs by mouth once as needed for mouth, throat or stomach pain 1000 mL 1 1/29/2021 at Unknown time     LORazepam (ATIVAN) 1 MG tablet TAKE 1 TABLET (1MG) BY MOUTH AS NEEDED FOR ANXIETY WITH TPN AND MEDICATIONS . JUST ONCE A DAY (30 TO LAST 30 DAYS) 30 tablet 0 Past Week at Unknown time     nicotine (NICODERM CQ) 21 MG/24HR 24 hr patch Place 1 patch onto the skin every 24 hours 30 patch 0 Past Month at Unknown time     nystatin (MYCOSTATIN) 585336 UNIT/GM external cream Apply topically 2 times daily 30 g 0 1/29/2021 at Unknown time     ondansetron (ZOFRAN-ODT) 8 MG ODT tab DISSOLVE ONE TABLET ON TONGUE EVERY 8 HOURS AS NEEDED FOR NAUSEA 90 tablet 1 Past Week at Unknown time     oxyCODONE (ROXICODONE) 5 MG/5ML solution Take 5-10 mLs (5-10 mg) by mouth 3 times daily as needed for pain Max of 30mg/day. Fill 02/06/21 to start on/after 02/09/21. 30 day supply. 900 mL 0 1/30/2021 at Unknown time     pantoprazole (PROTONIX) 40 MG EC tablet Take 1 tablet (40 mg) by mouth daily 30 tablet 5 Past Week at Unknown time     parenteral nutrition - PTA/DISCHARGE ORDER Patient receives 2050 mL TPN every 14 hours through PICC at [facility].   Past Week at Unknown time     sucralfate (CARAFATE) 1 GM/10ML suspension Take 1 g by mouth 4 times daily as needed   Past Week at Unknown time     VENTOLIN  (90 Base) MCG/ACT inhaler INHALE TWO PUFFS BY MOUTH EVERY 4 HOURS AS NEEDED FOR SHORTNESS OF BREATH /DYSPNEA OR WHEEZING 1 Inhaler 1 Past Week at Unknown time     vitamin D2 (ERGOCALCIFEROL) 36196 units (1250 mcg) capsule Take 1 capsule (50,000 Units) by mouth once a week 12 capsule 3 Past Week at Unknown time     alteplase 2 mg/2 mL (in 10 mL syringe) Inject 1 mg into the vein as needed   Unknown at Unknown time     diphenhydrAMINE (BENADRYL) 25 MG capsule Take 1 capsule (25 mg) by mouth every 8 hours Give 30 minutes prior to  "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  Phones are answered 24 hours a day 7 days a week 365 days a year.    Providers - Choose \"CONTINUE HOME MED (no script)\" at discharge if patient treatment with home infusion will continue.        Lidocaine (LIDOCARE) 4 % Patch Place 1 patch onto the skin every 24 hours To prevent lidocaine toxicity, patient should be patch free for 12 hrs daily. 30 patch 0 More than a month at Unknown time     lidocaine, alum & mag hydroxide-simethicone GI Cocktail 30 ml daily as needed for mouth/stomach pain. 1 Bottle 1 Unknown at Unknown time     naloxone (NARCAN) 4 MG/0.1ML nasal spray Spray 1 spray (4 mg) into one nostril alternating nostrils as needed for opioid reversal every 2-3 minutes until assistance arrives 0.2 mL 0 Unknown at Unknown time     nicotine (COMMIT) 2 MG lozenge Place 1 lozenge (2 mg) inside cheek every hour as needed for smoking cessation 30 lozenge 0 More than a month at Unknown time     polyethylene glycol (MIRALAX) 17 g packet Take 17 g by mouth daily 72 packet 6 More than a month at Unknown time              Allergies:     Allergies   Allergen Reactions     Bactrim [Sulfamethoxazole W/Trimethoprim] Rash     Penicillins Anaphylaxis     Please see Antimicrobial Management Team allergy assessment note 10/10/2018. Patient reported tolerating amoxicillin.     Doxycycline Rash     Vancomycin Rash     Rash after receiving vancomycin 3/28/16 (red man's?). Tolerated with slower infusion and diphenhydramine premed.          Labs:     Recent Results (from the past 48 hour(s))   Basic metabolic panel    Collection Time: 01/31/21  6:39 AM   Result Value Ref Range    Sodium 138 133 - 144 mmol/L    Potassium 3.7 3.4 - 5.3 mmol/L    Chloride 105 94 - 109 mmol/L "    Carbon Dioxide 30 20 - 32 mmol/L    Anion Gap 4 3 - 14 mmol/L    Glucose 91 70 - 99 mg/dL    Urea Nitrogen 18 7 - 30 mg/dL    Creatinine 0.82 0.66 - 1.25 mg/dL    GFR Estimate >90 >60 mL/min/[1.73_m2]    GFR Estimate If Black >90 >60 mL/min/[1.73_m2]    Calcium 8.9 8.5 - 10.1 mg/dL   CBC with platelets    Collection Time: 01/31/21  6:39 AM   Result Value Ref Range    WBC 8.8 4.0 - 11.0 10e9/L    RBC Count 4.06 (L) 4.4 - 5.9 10e12/L    Hemoglobin 11.4 (L) 13.3 - 17.7 g/dL    Hematocrit 36.5 (L) 40.0 - 53.0 %    MCV 90 78 - 100 fl    MCH 28.1 26.5 - 33.0 pg    MCHC 31.2 (L) 31.5 - 36.5 g/dL    RDW 20.6 (H) 10.0 - 15.0 %    Platelet Count 226 150 - 450 10e9/L   Drug screen urine    Collection Time: 01/31/21 11:55 AM   Result Value Ref Range    Benzodiazepine Qual Urine Negative NEG^Negative    Cannabinoids Qual Urine Negative NEG^Negative    Cocaine Qual Urine Negative NEG^Negative    Opiates Qualitative Urine Positive (A) NEG^Negative    Acetaminophen Qual PENDING NEG^Negative    Amantadine Qual PENDING NEG^Negative    Amitriptyline Qual PENDING NEG^Negative    Amoxapine Qual PENDING NEG^Negative    Amphetamines Qual PENDING NEG^Negative    Atropine Qual PENDING NEG^Negative    Bupropion Qual PENDING NEG^Negative    Caffeine Qual PENDING NEG^Negative    Carbamazepine Qual PENDING NEG^Negative    Chlorpheniramine Qual PENDING NEG^Negative    Chlorpromazine Qual PENDING NEG^Negative    Citalopram Qual PENDING NEG^Negative    Clomipramine Qual PENDING NEG^Negative    Cocaine Qual PENDING NEG^Negative    Codeine Qual PENDING NEG^Negative    Desipramine Qual PENDING NEG^Negative    Dextromethorphan Qual PENDING NEG^Negative    Diphenhydramine Qual PENDING NEG^Negative    Doxepin/metabolite Qual PENDING NEG^Negative    Doxylamine Qual PENDING NEG^Negative    Ephedrine or pseudo Qual PENDING NEG^Negative    Fentanyl Qual PENDING NEG^Negative    Fluoxetine and metab Qual PENDING NEG^Negative    Hydrocodone Qual PENDING  "NEG^Negative    Hydromorphone Qual PENDING NEG^Negative    Ibuprofen Qual PENDING NEG^Negative    Imipramine Qual PENDING NEG^Negative    Ketamine Qual PENDING NEG^Negative    Lamotrigine Qual PENDING NEG^Negative    Lidocaine Qual PENDING NEG^Negative    Loxapine Qual PENDING NEG^Negative    Maprotiline Qual PENDING NEG^Negative    MDMA Qual PENDING NEG^Negative    Meperidine Qual PENDING NEG^Negative    Methadone Qual PENDING NEG^Negative    Methamphetamine Qual PENDING NEG^Negative    Mirtazapine Qual PENDING NEG^Negative    Morphine Qual PENDING NEG^Negative    Nicotine Qual PENDING NEG^Negative    Nortriptyline Qual PENDING NEG^Negative    Olanzapine Qual PENDING NEG^Negative    Oxycodone Qual PENDING NEG^Negative    Pentazocine Qual PENDING NEG^Negative    Phencyclidine Qual PENDING NEG^Negative    Phentermine Qual PENDING NEG^Negative    Propofol Qual PENDING NEG^Negative    Propoxyphene Qual PENDING NEG^Negative    Propranolol Qual PENDING NEG^Negative    Pyrilamine Qual PENDING NEG^Negative    Quetiapine Metab Qual PENDING NEG^Negative    Salicylate Qual PENDING NEG^Negative    Sertraline Qual PENDING NEG^Negative    Theobromine Qual PENDING NEG^Negative    Trimipramine Qual PENDING NEG^Negative    Topiramate Qual PENDING NEG^Negative    Tramadol Qual PENDING NEG^Negative    Venlafaxine Qual PENDING NEG^Negative   Vancomycin level    Collection Time: 01/31/21 12:49 PM   Result Value Ref Range    Vancomycin Level 13.0 mg/L   Creatinine    Collection Time: 02/01/21  5:46 AM   Result Value Ref Range    Creatinine 0.73 0.66 - 1.25 mg/dL    GFR Estimate >90 >60 mL/min/[1.73_m2]    GFR Estimate If Black >90 >60 mL/min/[1.73_m2]          Physical and Psychiatric Examination:     BP (!) 132/91 (BP Location: Left arm)   Pulse 86   Temp 96.7  F (35.9  C) (Oral)   Resp 16   Ht 1.816 m (5' 11.5\")   Wt 87.8 kg (193 lb 9.6 oz)   SpO2 99%   BMI 26.63 kg/m    Weight is 193 lbs 9.6 oz  Body mass index is 26.63 " "kg/m .    Physical Exam:  I have reviewed the physical exam as documented by by the medical team and agree with findings and assessment and have no additional findings to add at this time.    Mental Status Exam:  Lying quietly in the hospital bed, is well groomed, pleasant, cooperative. Was a bit restless. Speech fluent. Stands without difficulty, but is a bit unsteady. Associations tight. Mood is \"OK.\"  Affect congruent. Thought process circumstantial. Thought content negative for suicidal thoughts or delusions. Oriented x2 (not date)  Recent and remote memory, attention span and concentration are mostly intact. Fund of knowledge, use of language appropriate. Insight and judgment fair .              DSM-5 Diagnosis:   Delirium, no resolved   unspecified neurocognitive disorder   ADHD by history   Pain disorder           Assessment:   It appears that he has a long history of altered mental status and confusion related to dehydration, problems with TPN or sleep.  He has now cleared and it appears that the Seroquel was quite helpful.  In consultation with nursing it was decided that we could try to do without a sitter as long as he has a bed alarm.  This was explained to him and he agreed to it.          Summary of Recommendations:   I would continue to use Seroquel 25 to 50 mg at bedtime, and may have available 25 mg up to twice a day as needed for significant anxiety or agitation.  On discharge from the hospital I would make that available to the patient to use rather than extra Ativan as he has been doing.   Okay to continue with 1 mg of Ativan at bedtime.  Would not use any extras.  It appears that he will need to stay on his outpatient doses of opioids.  I have discontinued the bedside attendant.   Page me at [phone], or re-consult psychiatry as needed.      \"This dictation was performed with voice recognition software and may contain errors,  omissions and inadvertent word substitution.\"           "

## 2021-02-01 NOTE — PROGRESS NOTES
Clinic Care Coordination Contact  Ambulatory Care Coordination to Inpatient Care Management   Hand-In Communication    Date:  February 1, 2021  Name: Parker Acevedo is enrolled in Ambulatory Care Coordination program and I am the Lead Care Coordinator.  CC Contact Information: Epic InBasket + phone  Payor Source: Payor: [facility] / Plan: [facility] MEDICARE ADVANTAGE / Product Type: Medicare /   Current services in place:     Please see the CC Snaphot and Care Management Flowsheets for specific details of this Parker Acevedo care plan.   Additional details/specific concerns r/t this admission:    Goals of Care :    Goals       #2 Medical (pt-stated)      Goal Statement: I want to prevent hospital visits  Date Goal set: 9/9/2020   Barriers: on chronic TPN, IV line  Strengths: home infusion, care coordination  Date to Achieve By: 3/9/21  Patient expressed understanding of goal: yes  Action steps to achieve this goal:  1. I will complete IV antibiotics as prescribed. (completed)  2. I will follow up with infectious disease provider as scheduled 9/24/20. (completed)  3. I will stay at home during COVID-19 pandemic.   4. I will monitor my temperature daily as instructed and contact [facility] for elevated temperature parameters.  5. I will have G-tube replaced as scheduled. (awaiting contact from surgery)      #3 Medical (pt-stated)      Goal Statement: I will schedule and attend an appointment with cardiology.  Date Goal set: 10/7/19  Barriers: COVID-19 pandemic, multiple specialists  Strengths: support from spouse  Date to Achieve By: 2/1/21  Patient expressed understanding of goal: yes  Action steps to achieve this goal:  1. I will schedule an appointment with cardiology  2. I will attend appointment with cardiology        I will follow this admission in Epic. Please feel free to contact me with questions or for further collaboration in discharge planning.      Dianelys Rankin BSN, RN, PHN, CCM  Primary  Clinic Care Coordination    [facility]  Primary Care Clinics  [email or web address]   Office: [phone]  Employed by [facility]

## 2021-02-01 NOTE — CONSULTS
Care Management Initial Consult    General Information  Assessment completed with: Patient, VM-chart review    Type of CM/SW Visit: Initial Assessment    Primary Care Provider verified and updated as needed: Yes   Readmission within the last 30 days: No previous admission in last 30 days   Reason for Consult: Discharge Planning, Elevated Risk Score   Advance Care Planning: Reviewed: Present on chart        Communication Assessment  Patient's communication style: Spoken language (English or Bilingual)    Hearing Difficulty or Deaf: no   Wear Glasses or Blind: no    Cognitive  Cognitive/Neuro/Behavioral: WDL  Level of Consciousness: alert  Arousal Level: arouses to voice  Orientation: oriented x 4  Mood/Behavior: calm  Best Language: 0 - No aphasia  Speech: clear, spontaneous    Living Environment:   People in home: Spouse     Current living Arrangements: House      Able to return to prior arrangements: Yes    Family/Social Support:  Care provided by: Self, spouse/significant other, homecare agency  Provides care for: No one             Description of Support System: Supportive, Involved       Current Resources:   Skilled Home Care Services: Skilled Nursing  Community Resources: Home Care, Home Infusion  Equipment currently used at home: Cane, straight  Supplies currently used at home: TPN and IV fluids    Employment/Financial:  Employment Status: Disabled        Financial Concerns: No concerns identified     Lifestyle & Psychosocial Needs:  Lifestyle     Physical activity     Days per week: 2 days     Minutes per session: 10 min     Stress: Only a little     Social Needs     Financial resource strain: Not hard at all     Food insecurity     Worry: Never true     Inability: Never true     Transportation needs     Medical: No     Non-medical: No     Socioeconomic History     Marital status:      Spouse name: Rose     Number of children: 1     Years of education: Not on file     Highest education level: GED or  equivalent   Relationships     Social connections     Talks on phone: Once a week     Gets together: Never     Attends Oriental orthodox service: Never     Active member of club or organization: No     Attends meetings of clubs or organizations: Never     Relationship status:      Intimate partner violence     Fear of current or ex partner: No     Emotionally abused: No     Physically abused: No     Forced sexual activity: No     Tobacco Use     Smoking status: Current Some Day Smoker     Packs/day: 1.00     Years: 6.00     Pack years: 6.00     Types: Cigarettes     Smokeless tobacco: Never Used   Substance and Sexual Activity     Alcohol use: No     Frequency: Never     Drinks per session: Patient refused     Binge frequency: Never     Comment: quit 2002     Drug use: No     Sexual activity: Yes     Partners: Female     Birth control/protection: None     Comment: no protection       Functional Status:  Prior to admission patient needed assistance:   Dependent ADLs: Ambulation-cane, Bathing  Dependent IADLs: Medication Management, Shopping, Transportation  Assesssment of Functional Status: At functional baseline    Mental Health Status:  Mental Health Status: Current Concern  Mental Health Management: Medication    Chemical Dependency Status:  Chemical Dependency Status: No Current Concerns           Values/Beliefs:  Spiritual, Cultural Beliefs, Hoahaoism Practices, Values that affect care: No               Additional Information:    Patient was admitted for bacteremia, found to have blood cultures positive for E. Faecalis. Patient currently on IV antibiotics. ID following.     Patient is currently open to [facility] for TPN and IV hydration (2L NS daily) and receives nursing services through [facility] (Phn: [phone], Fax: [phone]). Writer confirmed home care/home infusion services with [facility] liaison.     AVS updated. RNCC will follow.     Jenna Jaimes RN, BSN, PHN  Care Coordinator   P:  [phone], [facility]

## 2021-02-02 ENCOUNTER — APPOINTMENT (OUTPATIENT)
Dept: CARDIOLOGY | Facility: CLINIC | Age: 51
DRG: 315 | End: 2021-02-02
Attending: STUDENT IN AN ORGANIZED HEALTH CARE EDUCATION/TRAINING PROGRAM
Payer: COMMERCIAL

## 2021-02-02 DIAGNOSIS — Z98.84 GASTRIC BYPASS STATUS FOR OBESITY: Primary | ICD-10-CM

## 2021-02-02 LAB
ANION GAP SERPL CALCULATED.3IONS-SCNC: <1 MMOL/L (ref 3–14)
BUN SERPL-MCNC: 13 MG/DL (ref 7–30)
CALCIUM SERPL-MCNC: 8.3 MG/DL (ref 8.5–10.1)
CHLORIDE SERPL-SCNC: 108 MMOL/L (ref 94–109)
CO2 SERPL-SCNC: 32 MMOL/L (ref 20–32)
CREAT SERPL-MCNC: 0.87 MG/DL (ref 0.66–1.25)
GFR SERPL CREATININE-BSD FRML MDRD: >90 ML/MIN/{1.73_M2}
GLUCOSE SERPL-MCNC: 86 MG/DL (ref 70–99)
POTASSIUM SERPL-SCNC: 3.6 MMOL/L (ref 3.4–5.3)
SODIUM SERPL-SCNC: 140 MMOL/L (ref 133–144)
VANCOMYCIN SERPL-MCNC: 15.9 MG/L

## 2021-02-02 PROCEDURE — 250N000009 HC RX 250: Performed by: STUDENT IN AN ORGANIZED HEALTH CARE EDUCATION/TRAINING PROGRAM

## 2021-02-02 PROCEDURE — 250N000011 HC RX IP 250 OP 636: Performed by: INTERNAL MEDICINE

## 2021-02-02 PROCEDURE — 250N000013 HC RX MED GY IP 250 OP 250 PS 637: Performed by: STUDENT IN AN ORGANIZED HEALTH CARE EDUCATION/TRAINING PROGRAM

## 2021-02-02 PROCEDURE — 99233 SBSQ HOSP IP/OBS HIGH 50: CPT | Performed by: STUDENT IN AN ORGANIZED HEALTH CARE EDUCATION/TRAINING PROGRAM

## 2021-02-02 PROCEDURE — 93321 DOPPLER ECHO F-UP/LMTD STD: CPT | Mod: 26 | Performed by: STUDENT IN AN ORGANIZED HEALTH CARE EDUCATION/TRAINING PROGRAM

## 2021-02-02 PROCEDURE — 250N000011 HC RX IP 250 OP 636: Performed by: NURSE PRACTITIONER

## 2021-02-02 PROCEDURE — 80048 BASIC METABOLIC PNL TOTAL CA: CPT | Performed by: INTERNAL MEDICINE

## 2021-02-02 PROCEDURE — 93308 TTE F-UP OR LMTD: CPT | Mod: 26 | Performed by: STUDENT IN AN ORGANIZED HEALTH CARE EDUCATION/TRAINING PROGRAM

## 2021-02-02 PROCEDURE — 93325 DOPPLER ECHO COLOR FLOW MAPG: CPT

## 2021-02-02 PROCEDURE — 250N000013 HC RX MED GY IP 250 OP 250 PS 637: Performed by: INTERNAL MEDICINE

## 2021-02-02 PROCEDURE — 99223 1ST HOSP IP/OBS HIGH 75: CPT | Performed by: STUDENT IN AN ORGANIZED HEALTH CARE EDUCATION/TRAINING PROGRAM

## 2021-02-02 PROCEDURE — 99207 PR CDG-CUT & PASTE-POTENTIAL IMPACT ON LEVEL: CPT | Performed by: STUDENT IN AN ORGANIZED HEALTH CARE EDUCATION/TRAINING PROGRAM

## 2021-02-02 PROCEDURE — 36592 COLLECT BLOOD FROM PICC: CPT | Performed by: STUDENT IN AN ORGANIZED HEALTH CARE EDUCATION/TRAINING PROGRAM

## 2021-02-02 PROCEDURE — 80202 ASSAY OF VANCOMYCIN: CPT | Performed by: STUDENT IN AN ORGANIZED HEALTH CARE EDUCATION/TRAINING PROGRAM

## 2021-02-02 PROCEDURE — 36415 COLL VENOUS BLD VENIPUNCTURE: CPT | Performed by: INTERNAL MEDICINE

## 2021-02-02 PROCEDURE — 120N000002 HC R&B MED SURG/OB UMMC

## 2021-02-02 PROCEDURE — 250N000013 HC RX MED GY IP 250 OP 250 PS 637: Performed by: EMERGENCY MEDICINE

## 2021-02-02 PROCEDURE — 258N000003 HC RX IP 258 OP 636: Performed by: INTERNAL MEDICINE

## 2021-02-02 PROCEDURE — 93325 DOPPLER ECHO COLOR FLOW MAPG: CPT | Mod: 26 | Performed by: STUDENT IN AN ORGANIZED HEALTH CARE EDUCATION/TRAINING PROGRAM

## 2021-02-02 RX ADMIN — VANCOMYCIN HYDROCHLORIDE 1750 MG: 10 INJECTION, POWDER, LYOPHILIZED, FOR SOLUTION INTRAVENOUS at 14:35

## 2021-02-02 RX ADMIN — VANCOMYCIN HYDROCHLORIDE 1750 MG: 10 INJECTION, POWDER, LYOPHILIZED, FOR SOLUTION INTRAVENOUS at 01:51

## 2021-02-02 RX ADMIN — HYDROXYZINE HYDROCHLORIDE 25 MG: 25 TABLET, FILM COATED ORAL at 04:40

## 2021-02-02 RX ADMIN — ONDANSETRON 8 MG: 4 TABLET, ORALLY DISINTEGRATING ORAL at 01:50

## 2021-02-02 RX ADMIN — OXYCODONE HYDROCHLORIDE 10 MG: 5 SOLUTION ORAL at 06:05

## 2021-02-02 RX ADMIN — OXYCODONE HYDROCHLORIDE 10 MG: 5 SOLUTION ORAL at 22:47

## 2021-02-02 RX ADMIN — FENTANYL 1 PATCH: 25 PATCH, EXTENDED RELEASE TRANSDERMAL at 06:17

## 2021-02-02 RX ADMIN — SODIUM CHLORIDE, PRESERVATIVE FREE 5 ML: 5 INJECTION INTRAVENOUS at 13:03

## 2021-02-02 RX ADMIN — DIPHENHYDRAMINE HYDROCHLORIDE 25 MG: 25 SOLUTION ORAL at 01:48

## 2021-02-02 RX ADMIN — ONDANSETRON 8 MG: 4 TABLET, ORALLY DISINTEGRATING ORAL at 10:18

## 2021-02-02 RX ADMIN — NICOTINE 1 PATCH: 14 PATCH, EXTENDED RELEASE TRANSDERMAL at 09:43

## 2021-02-02 RX ADMIN — DEXTROAMPHETAMINE SACCHARATE, AMPHETAMINE ASPARTATE, DEXTROAMPHETAMINE SULFATE AND AMPHETAMINE SULFATE 20 MG: 2.5; 2.5; 2.5; 2.5 TABLET ORAL at 09:39

## 2021-02-02 RX ADMIN — CARVEDILOL 6.25 MG: 6.25 TABLET, FILM COATED ORAL at 09:39

## 2021-02-02 RX ADMIN — SODIUM CHLORIDE, PRESERVATIVE FREE 5 ML: 5 INJECTION INTRAVENOUS at 06:08

## 2021-02-02 RX ADMIN — DIPHENHYDRAMINE HYDROCHLORIDE 25 MG: 25 SOLUTION ORAL at 14:25

## 2021-02-02 RX ADMIN — QUETIAPINE FUMARATE 50 MG: 50 TABLET ORAL at 22:48

## 2021-02-02 RX ADMIN — OXYCODONE HYDROCHLORIDE 10 MG: 5 SOLUTION ORAL at 01:48

## 2021-02-02 RX ADMIN — OXYCODONE HYDROCHLORIDE 10 MG: 5 SOLUTION ORAL at 14:19

## 2021-02-02 RX ADMIN — OXYCODONE HYDROCHLORIDE 10 MG: 5 SOLUTION ORAL at 18:26

## 2021-02-02 RX ADMIN — OXYCODONE HYDROCHLORIDE 10 MG: 5 SOLUTION ORAL at 10:18

## 2021-02-02 RX ADMIN — ONDANSETRON 8 MG: 4 TABLET, ORALLY DISINTEGRATING ORAL at 22:48

## 2021-02-02 RX ADMIN — CARVEDILOL 6.25 MG: 6.25 TABLET, FILM COATED ORAL at 18:26

## 2021-02-02 RX ADMIN — Medication 10 ML: at 18:26

## 2021-02-02 RX ADMIN — LIDOCAINE HYDROCHLORIDE 30 ML: 20 SOLUTION ORAL; TOPICAL at 14:20

## 2021-02-02 RX ADMIN — Medication 1 MG: at 22:49

## 2021-02-02 ASSESSMENT — ACTIVITIES OF DAILY LIVING (ADL)
ADLS_ACUITY_SCORE: 11

## 2021-02-02 NOTE — PROGRESS NOTES
2/2/2021   Infectious Diseases Telephone Note    I am not following Parker as in inpatient consultant but I follow him in clinic. Full inpatient ID consult is pending. Parker is TPN dependent and has a long history of polymicrobial line infections. I was able to touch base with [facility] today to clarify some questions about his line and the potential for an ethanol lock to decrease future infections. [facility] reports that his current line is polyurethane which can be degraded by ethanol. However, they believe we should have access to a silicone tunneled catheter that could safely be used with an ethanol lock.     If Parker's line does need to be replaced during this admission, I'd agree with trying to pursue a silicone line and initiating ethanol locks. [facility] is going to try to find more information about the preferred line and I will pass it on to the inpatient team if they find something that could be useful (brand name, etc.).     Марина Buckner MD  Infectious Diseases

## 2021-02-02 NOTE — PROGRESS NOTES
[facility]     Medicine Progress Note - Hospitalist Service, Gold 8       Date of Admission:  1/29/2021  Assessment & Plan       Parker Acevedo is a 50 year old male admitted on 1/29/2021. He is a 49 yo male with PMH significant for Eclestino-en-Y gastric bypass, esophagojejunostomy c/b short gut syndrome and chronic malnutrition, and history of recurrent line-related infections, here after 1.5 days of fevers at home, found to have blood cultures positive for E. Faecalis. He is admitted for IV antibiotics. Hemodynamically stable here. Can restart TPN in AM if ok with ID    #E. faecalis bacteremia  #Hx of recurrent polymicrobial bacteremia  Has multiple past infections for bactermia/CLABSI. Admitted 7/28/20 with MRSE from Cm CVC (replaced 8/3/2020). Admitted again 8/31/2020 with blood cultures positive for staph lugdunensis. Cm removed and replaced at that August '20 admission. Follows with ID outpatient (Dr. Buckner). He had ~2 days of fever prior to this admission, so ID arranged blood cultures outpatient. Positive for gram positive cocci in clusters in 1/2 samples (unclear if peripheral or from line). Per Dr. Buckner, was started on IV Ceftriaxone and IV Vanco. Has since speciated as E. Faecalis. Cannot give IV ampicillin due to listed allergy (anaphylaxis). After discussion with pharmacy, will treat with IV Linezolid for now. He is hemodynamically stable here.  - ID aware of admission, will place formal consult given recurrent bacteremia  - Was on IV ceftriaxone 2g IV Q24H (note allergy to penicillins, though patient had tolerated ceftriaxone before) and IV vanco (pharmacy to dose)   - Given e faecalis, discussed with pharmacy and will transition to IV linezolid monotherapy. Cannot give IV ampicillin due to penicillin allergy.  - Follow blood cultures  - Consider TTE  - Will not use Cm at present for TPN; may need line replacement  - Per ID, IF patient  requires current line to be removed, home care has been advocating for future line to be one that would tolerate an ethanol lock to decrease future instances of bacteremia  - discussed with Dr. Buckner on 1/30  - discuss again in AM on 2/2. bld cx pdg    # Hx Celestino-en-Y gastric bypass, esophagojejunostomy c/b short gut syndrome and chronic malnutrition  # Chronic Abdominal Pain   Has been without G-tube for ~3 months, scheduled to be replaced.  - Continue PTA tylenol, fentanyl, oxycodone  - Holding home TPN  - Continue PTA PRN acetaminophen, fentanyl patch, and oxycodone  - Continue PTA GI cocktail PRN  - Continue PTA PRN sucralfate  - consider replacing G-tube for feeding, venting     #HTN: continue PTA coreg  # Asthma: continue albuterol inhaler  # ADHD: continue adderall   # Anxiety: continue lorazepam prn (for TPN and meds)  # GERD: continue pta PPI   # Normocytic anemia: 12.1 here, at baseline. Monitor  # paranoia and inappropriate behavior. Prn quetiapine. Psych consult     Diet: Regular Diet Adult    DVT Prophylaxis: Low Risk/Ambulatory with no VTE prophylaxis indicated  Sanchez Catheter: not present  Code Status: Full Code           Disposition Plan   Expected discharge: 2 - 3 days, recommended to prior living arrangement once antibiotic plan established.  Entered: Bakari Oliver MD 02/01/2021, 7:02 PM       The patient's care was discussed with the Bedside Nurse and Patient.    Bakari Oliver MD  Hospitalist Service, [address]   Contact information available via [facility] Paging/Directory  Please see sign in/sign out for up to date coverage information  ______________________________________________________________________    Interval History   Less paranoid and inappropriate. Discussed with psych. Improved on seroquel overnight. Slept well.    . No CP. Has sitter due to behavior, see RN note    Wants to leave in AM if able to get IV abx as outpt  once TPN can be restarted    Data reviewed today: I reviewed all medications, new labs and imaging results over the last 24 hours. I personally reviewed no images or EKG's today.    Physical Exam   Vital Signs: Temp: 98  F (36.7  C) Temp src: Oral BP: 104/70 Pulse: 80   Resp: 16 SpO2: 98 % O2 Device: None (Room air)    Weight: 193 lbs 9.6 oz  General Appearance: Alert, nad, wearing dark sunglasses inside  Respiratory: clear  Cardiovascular: rrr  GI: soft, tender throughout, nabs  Skin: w/d no rash  Other: Not expressing inappropriate thoughts to me. Talking with sitter    Data   Recent Labs   Lab 02/01/21  0546 01/31/21  0639 01/30/21  0619 01/29/21  2312 01/26/21  1230 01/26/21  1230   WBC  --  8.8 8.9 9.4   < > 5.9   HGB  --  11.4* 12.4* 12.1*   < > 11.5*   MCV  --  90 90 89   < > 89   PLT  --  226 243 239   < > 211   INR  --   --   --  1.04  --   --    NA  --  138  --  140  --  144   POTASSIUM  --  3.7  --  3.8  --  3.4   CHLORIDE  --  105  --  108  --  111*   CO2  --  30  --  28  --  29   BUN  --  18  --  14  --  8   CR 0.73 0.82  --  0.67  --  0.52*   ANIONGAP  --  4  --  5  --  4   SILAS  --  8.9  --  8.8  --  8.5   GLC  --  91  --  96  --  84   ALBUMIN  --   --   --  3.4  --  3.2*   PROTTOTAL  --   --   --  6.7*  --  6.4*   BILITOTAL  --   --   --  1.0  --  0.3   ALKPHOS  --   --   --  69  --  66   ALT  --   --   --  40  --  30   AST  --   --   --  19  --  22    < > = values in this interval not displayed.     No results found for this or any previous visit (from the past 24 hour(s)).  Medications       amphetamine-dextroamphetamine  20 mg Oral Daily     carvedilol  6.25 mg Oral BID w/meals     fentaNYL  25 mcg Transdermal Q72H     fentaNYL   Transdermal Q8H     heparin lock flush  5-10 mL Intracatheter Q24H     nicotine  1 patch Transdermal Daily     nicotine   Transdermal Q8H     vancomycin (VANCOCIN) IV  1,750 mg Intravenous Q12H

## 2021-02-02 NOTE — PROVIDER NOTIFICATION
Provider notification    Doctor Maya notified at 1330 Via Smart web page.    Reason for notification: ID notified writer of new results r/t 1/29/2021 2348 & 2312 blood cultures.    Plan: ID consult & echo ordered.

## 2021-02-02 NOTE — PROGRESS NOTES
Parker Acevedo is a 50 year old male admitted on 1/29/2021. He is a 49 yo male with PMH significant for Celestino-en-Y gastric bypass, esophagojejunostomy c/b short gut syndrome and chronic malnutrition, and history of recurrent line-related infections, here after 1.5 days of fevers at home, found to have blood cultures positive for E. Faecalis. He is admitted for IV antibiotics. Hemodynamically stable here. Can restart TPN in AM if ok with ID.     Continues w/ abdominal pain, oxycodone given x1, some relief;   fentanyl patch in place on L shoulder/arm  Nicotine patch on R. Deltoid.   Pt complained of some abdominal discomfort - admin oxy  Wound cares performed right before bed   Continues on IV antibiotics, premedicate 30 min w/ benadryl prior to vancomycin.  Psych consulted - see note.   Continue to monitor & w/ POC.

## 2021-02-02 NOTE — PROGRESS NOTES
Continues w/ abdominal pain, oxycodone given x1, some relief;   fentanyl patch in place on L. Back.   Nicotine patch on R. Deltoid.   Pt complained of some abdominal discomfort, carafate administered.  Wound cares performed x 2   Continues on IV antibiotics, premedicate w/ benadryl prior to vancomycin.  Psych consulted.   Continue to monitor & w/ POC.

## 2021-02-02 NOTE — PROGRESS NOTES
[facility]     Medicine Progress Note - Hospitalist Service, Gold Neda       Date of Admission:  1/29/2021  Assessment & Plan   Parker Acevedo is a 50 year old male admitted on 1/29/2021. He is a 51 yo male with PMH significant for Celestino-en-Y gastric bypass, esophagojejunostomy c/b short gut syndrome and chronic malnutrition, and history of recurrent line-related infections, here after 1.5 days of fevers at home, found to have blood cultures positive for polymicrobial deepa     #E. Faecalis, staph hominis, staph capitis, staph epidermidis, corynebacterium, and aerococcus bacteremia  #Hx of recurrent polymicrobial bacteremia  Has multiple past infections for bactermia/CLABSI. Admitted 7/28/20 with MRSE from Cm CVC (replaced 8/3/2020). Admitted again 8/31/2020 with blood cultures positive for staph lugdunensis. Cm removed and replaced at that August '20 admission. Follows with ID outpatient (Dr. Buckner). He had ~2 days of fever prior to this admission, so ID arranged blood cultures outpatient. Positive for polymicrobial deepa.  - will consult ID for assistance with abx; will likely need line removed  - on vancomycin  - Follow blood cultures  - TTE     # Hx Celestino-en-Y gastric bypass, esophagojejunostomy c/b short gut syndrome and chronic malnutrition  # Chronic Abdominal Pain   Has been without G-tube for ~3 months, scheduled to be replaced.  - Continue PTA tylenol, fentanyl, oxycodone  - Holding home TPN  - Continue PTA PRN acetaminophen, fentanyl patch, and oxycodone  - Continue PTA GI cocktail PRN  - Continue PTA PRN sucralfate  - consider replacing G-tube for feeding, venting     #HTN: continue PTA coreg  # Asthma: continue albuterol inhaler  # ADHD: continue adderall   # Anxiety: continue lorazepam prn (for TPN and meds)  # GERD: continue pta PPI   # Normocytic anemia: 12.1 here, at baseline. Monitor  # paranoia and inappropriate behavior. Prn quetiapine.  Psych consult        Diet: Regular Diet Adult    DVT Prophylaxis: Low Risk/Ambulatory with no VTE prophylaxis indicated  Sanchez Catheter: not present  Code Status: Full Code             Disposition Plan   Expected discharge: 4 - 7 days, recommended to prior living arrangement once antibiotic plan established.  Entered: Sammi Trejo MD 02/02/2021, 2:19 PM       The patient's care was discussed with the Bedside Nurse and Patient.    Sammi Trejo MD  Hospitalist Service, [address]   Contact information available via [facility] Paging/Directory  Please see sign in/sign out for up to date coverage information  ______________________________________________________________________    Interval History   Nursing notes reviewed. ROLANDA. Feeling well this AM. Wondering when he can leave    Data reviewed today: I reviewed all medications, new labs and imaging results over the last 24 hours. I personally reviewed no images or EKG's today.    Physical Exam   Vital Signs: Temp: 96.3  F (35.7  C) Temp src: Oral BP: 120/67 Pulse: 61   Resp: 16 SpO2: 100 % O2 Device: None (Room air)    Weight: 193 lbs 9.6 oz  General Appearance: Middle aged man in NAD sitting in hospital bed  Respiratory: ctab on ra, nonlabored  Cardiovascular: rrr, s1, s2, no murmurs  GI: soft, nt/nd  Skin: warm and dry, no rash on exposed skin  Neuro: alert, interactive, speech fluent, grossly nonfocal     Data   Recent Labs   Lab 02/02/21  0605 02/01/21  0546 01/31/21  0639 01/30/21  0619 01/29/21  2312   WBC  --   --  8.8 8.9 9.4   HGB  --   --  11.4* 12.4* 12.1*   MCV  --   --  90 90 89   PLT  --   --  226 243 239   INR  --   --   --   --  1.04     --  138  --  140   POTASSIUM 3.6  --  3.7  --  3.8   CHLORIDE 108  --  105  --  108   CO2 32  --  30  --  28   BUN 13  --  18  --  14   CR 0.87 0.73 0.82  --  0.67   ANIONGAP <1*  --  4  --  5   SILAS 8.3*  --  8.9  --  8.8   GLC 86  --  91  --  96    ALBUMIN  --   --   --   --  3.4   PROTTOTAL  --   --   --   --  6.7*   BILITOTAL  --   --   --   --  1.0   ALKPHOS  --   --   --   --  69   ALT  --   --   --   --  40   AST  --   --   --   --  19     Recent Results (from the past 24 hour(s))   Echo Limited    Narrative    [id number]  [id number]  [id number]  [id number]^CARLOS^KYLEE^J           [facility]  Echocardiography Laboratory  [address] [place] [id number]     Name: SARA HASSAN  MRN: [id number]  : 1970  Study Date: 2021 02:09 PM  Age: 50 yrs  Gender: Male  Patient Location: [facility]  Reason For Study: Endocarditis  Ordering Physician: KYLEE GONZALEZ  Performed By: ETTA Miguel     BSA: 2.1 m2  Height: 72 in  Weight: 193 lb  BP: 120/67 mmHg  _____________________________________________________________________________  __        Procedure  Limited Portable Echo Adult.  _____________________________________________________________________________  __        Interpretation Summary  No evidence of vegetation on the aortic, tricuspid and mitral valve, the  pulmonic valve was not well visualized. If clinically indicated, consider RONEL  to evaluate for endocarditis.  Global and regional left ventricular function is normal with an EF of 55-60%.  The right ventricle is normal size. Global right ventricular function is  normal.  _____________________________________________________________________________  __        Left Ventricle  Global and regional left ventricular function is normal with an EF of 55-60%.     Right Ventricle  The right ventricle is normal size. Global right ventricular function is  normal.     Mitral Valve  The mitral valve is normal. Trace mitral insufficiency is present.        Aortic Valve  The valve leaflets are not well visualized. On Doppler interrogation, there is  no significant stenosis or regurgitation.     Tricuspid Valve  The tricuspid valve is normal. Trace tricuspid  insufficiency is present.     Pulmonic Valve  The pulmonic valve cannot be assessed.     Pericardium  No pericardial effusion is present.     Compared to Previous Study  This study was compared with the study from 9/2/2020 . No significant changes  noted.     _____________________________________________________________________________  __                       Report approved by: MD Jose Enrique Sanchez 02/02/2021 03:07 PM                 _____________________________________________________________________________  __        Medications       amphetamine-dextroamphetamine  20 mg Oral Daily     carvedilol  6.25 mg Oral BID w/meals     fentaNYL  25 mcg Transdermal Q72H     fentaNYL   Transdermal Q8H     heparin lock flush  5-10 mL Intracatheter Q24H     nicotine  1 patch Transdermal Daily     nicotine   Transdermal Q8H     vancomycin (VANCOCIN) IV  1,750 mg Intravenous Q12H

## 2021-02-02 NOTE — PHARMACY-VANCOMYCIN DOSING SERVICE
Pharmacy Vancomycin Note  Date of Service 2021  Patient's  1970   50 year old, male    Indication: Bacteremia  Goal Trough Level: 15-20 mg/L  Day of Therapy: 4  Current Vancomycin regimen:  1750 mg IV q12h    Current estimated CrCl = Estimated Creatinine Clearance: 129 mL/min (based on SCr of 0.87 mg/dL).    Creatinine for last 3 days  2021:  6:39 AM Creatinine 0.82 mg/dL  2021:  5:46 AM Creatinine 0.73 mg/dL  2021:  6:05 AM Creatinine 0.87 mg/dL    Recent Vancomycin Levels (past 3 days)  2021: 12:49 PM Vancomycin Level 13.0 mg/L  2021:  1:06 PM Vancomycin Level 15.9 mg/L    Vancomycin IV Administrations (past 72 hours)                   vancomycin (VANCOCIN) 1,750 mg in sodium chloride 0.9 % 500 mL intermittent infusion (mg) 1,750 mg New Bag 21 0151     1,750 mg New Bag 21 1519     1,750 mg New Bag  0333     1,750 mg New Bag 21 1612    vancomycin 1500 mg in 0.9% NaCl 250 ml intermittent infusion 1,500 mg (mg) 1,500 mg New Bag 21 0345     1,500 mg New Bag 21 1618                Nephrotoxins and other renal medications (From now, onward)    Start     Dose/Rate Route Frequency Ordered Stop    21 1400  vancomycin (VANCOCIN) 1,750 mg in sodium chloride 0.9 % 500 mL intermittent infusion      1,750 mg  over 2 Hours Intravenous EVERY 12 HOURS 21 1355               Contrast Orders - past 72 hours (72h ago, onward)    None          Interpretation of levels and current regimen:  Trough level is  Therapeutic    Has serum creatinine changed > 50% in last 72 hours: No    Urine output:  unable to determine    Renal Function: Stable    Plan:  1.  Continue current dose of vancomycin 1,750 mg IV every 12 hours.   2.  Pharmacy will check trough levels as appropriate in 1-3 Days.    3. Serum creatinine levels will be ordered daily for the first week of therapy and at least twice weekly for subsequent weeks.      Lucia Espinoza, PharmD Student          .

## 2021-02-03 ENCOUNTER — APPOINTMENT (OUTPATIENT)
Dept: INTERVENTIONAL RADIOLOGY/VASCULAR | Facility: CLINIC | Age: 51
DRG: 315 | End: 2021-02-03
Attending: NURSE PRACTITIONER
Payer: COMMERCIAL

## 2021-02-03 LAB
ALBUMIN SERPL-MCNC: 3.2 G/DL (ref 3.4–5)
ALP SERPL-CCNC: 63 U/L (ref 40–150)
ALT SERPL W P-5'-P-CCNC: 27 U/L (ref 0–70)
ANION GAP SERPL CALCULATED.3IONS-SCNC: 4 MMOL/L (ref 3–14)
AST SERPL W P-5'-P-CCNC: 12 U/L (ref 0–45)
BACTERIA SPEC CULT: ABNORMAL
BILIRUB SERPL-MCNC: 0.4 MG/DL (ref 0.2–1.3)
BUN SERPL-MCNC: 17 MG/DL (ref 7–30)
CALCIUM SERPL-MCNC: 8.5 MG/DL (ref 8.5–10.1)
CHLORIDE SERPL-SCNC: 109 MMOL/L (ref 94–109)
CO2 SERPL-SCNC: 29 MMOL/L (ref 20–32)
CREAT SERPL-MCNC: 0.82 MG/DL (ref 0.66–1.25)
ERYTHROCYTE [DISTWIDTH] IN BLOOD BY AUTOMATED COUNT: 19.9 % (ref 10–15)
GFR SERPL CREATININE-BSD FRML MDRD: >90 ML/MIN/{1.73_M2}
GLUCOSE SERPL-MCNC: 84 MG/DL (ref 70–99)
HCT VFR BLD AUTO: 36.1 % (ref 40–53)
HGB BLD-MCNC: 11.3 G/DL (ref 13.3–17.7)
MCH RBC QN AUTO: 30 PG (ref 26.5–33)
MCHC RBC AUTO-ENTMCNC: 31.3 G/DL (ref 31.5–36.5)
MCV RBC AUTO: 96 FL (ref 78–100)
PLATELET # BLD AUTO: 168 10E9/L (ref 150–450)
POTASSIUM SERPL-SCNC: 3.8 MMOL/L (ref 3.4–5.3)
PROT SERPL-MCNC: 6.2 G/DL (ref 6.8–8.8)
RBC # BLD AUTO: 3.77 10E12/L (ref 4.4–5.9)
SODIUM SERPL-SCNC: 141 MMOL/L (ref 133–144)
SPECIMEN SOURCE: ABNORMAL
WBC # BLD AUTO: 5.8 10E9/L (ref 4–11)

## 2021-02-03 PROCEDURE — 87070 CULTURE OTHR SPECIMN AEROBIC: CPT | Performed by: STUDENT IN AN ORGANIZED HEALTH CARE EDUCATION/TRAINING PROGRAM

## 2021-02-03 PROCEDURE — 36592 COLLECT BLOOD FROM PICC: CPT | Performed by: STUDENT IN AN ORGANIZED HEALTH CARE EDUCATION/TRAINING PROGRAM

## 2021-02-03 PROCEDURE — 250N000013 HC RX MED GY IP 250 OP 250 PS 637: Performed by: INTERNAL MEDICINE

## 2021-02-03 PROCEDURE — 85027 COMPLETE CBC AUTOMATED: CPT | Performed by: STUDENT IN AN ORGANIZED HEALTH CARE EDUCATION/TRAINING PROGRAM

## 2021-02-03 PROCEDURE — 36589 REMOVAL TUNNELED CV CATH: CPT | Mod: RT | Performed by: PHYSICIAN ASSISTANT

## 2021-02-03 PROCEDURE — 99207 PR CDG-CUT & PASTE-POTENTIAL IMPACT ON LEVEL: CPT | Performed by: STUDENT IN AN ORGANIZED HEALTH CARE EDUCATION/TRAINING PROGRAM

## 2021-02-03 PROCEDURE — 258N000003 HC RX IP 258 OP 636: Performed by: INTERNAL MEDICINE

## 2021-02-03 PROCEDURE — 80053 COMPREHEN METABOLIC PANEL: CPT | Performed by: STUDENT IN AN ORGANIZED HEALTH CARE EDUCATION/TRAINING PROGRAM

## 2021-02-03 PROCEDURE — 120N000002 HC R&B MED SURG/OB UMMC

## 2021-02-03 PROCEDURE — 02PY33Z REMOVAL OF INFUSION DEVICE FROM GREAT VESSEL, PERCUTANEOUS APPROACH: ICD-10-PCS | Performed by: PHYSICIAN ASSISTANT

## 2021-02-03 PROCEDURE — 999N000127 HC STATISTIC PERIPHERAL IV START W US GUIDANCE

## 2021-02-03 PROCEDURE — 250N000011 HC RX IP 250 OP 636: Performed by: INTERNAL MEDICINE

## 2021-02-03 PROCEDURE — 99233 SBSQ HOSP IP/OBS HIGH 50: CPT | Performed by: STUDENT IN AN ORGANIZED HEALTH CARE EDUCATION/TRAINING PROGRAM

## 2021-02-03 PROCEDURE — 0JPTXXZ REMOVAL OF TUNNELED VASCULAR ACCESS DEVICE FROM TRUNK SUBCUTANEOUS TISSUE AND FASCIA, EXTERNAL APPROACH: ICD-10-PCS | Performed by: PHYSICIAN ASSISTANT

## 2021-02-03 PROCEDURE — 87040 BLOOD CULTURE FOR BACTERIA: CPT | Performed by: STUDENT IN AN ORGANIZED HEALTH CARE EDUCATION/TRAINING PROGRAM

## 2021-02-03 PROCEDURE — 250N000009 HC RX 250: Performed by: PHYSICIAN ASSISTANT

## 2021-02-03 PROCEDURE — 99232 SBSQ HOSP IP/OBS MODERATE 35: CPT | Performed by: STUDENT IN AN ORGANIZED HEALTH CARE EDUCATION/TRAINING PROGRAM

## 2021-02-03 PROCEDURE — 250N000013 HC RX MED GY IP 250 OP 250 PS 637: Performed by: STUDENT IN AN ORGANIZED HEALTH CARE EDUCATION/TRAINING PROGRAM

## 2021-02-03 PROCEDURE — 36589 REMOVAL TUNNELED CV CATH: CPT

## 2021-02-03 PROCEDURE — 250N000011 HC RX IP 250 OP 636: Performed by: NURSE PRACTITIONER

## 2021-02-03 PROCEDURE — 250N000009 HC RX 250: Performed by: STUDENT IN AN ORGANIZED HEALTH CARE EDUCATION/TRAINING PROGRAM

## 2021-02-03 RX ORDER — SUCRALFATE ORAL 1 G/10ML
1 SUSPENSION ORAL 4 TIMES DAILY PRN
Qty: 420 ML | Refills: 1 | Status: SHIPPED | OUTPATIENT
Start: 2021-02-03 | End: 2021-04-19

## 2021-02-03 RX ORDER — LIDOCAINE HYDROCHLORIDE 10 MG/ML
1-30 INJECTION, SOLUTION EPIDURAL; INFILTRATION; INTRACAUDAL; PERINEURAL
Status: COMPLETED | OUTPATIENT
Start: 2021-02-03 | End: 2021-02-03

## 2021-02-03 RX ADMIN — SODIUM CHLORIDE, PRESERVATIVE FREE 5 ML: 5 INJECTION INTRAVENOUS at 07:45

## 2021-02-03 RX ADMIN — QUETIAPINE FUMARATE 50 MG: 50 TABLET ORAL at 21:29

## 2021-02-03 RX ADMIN — DIPHENHYDRAMINE HYDROCHLORIDE 25 MG: 25 SOLUTION ORAL at 14:22

## 2021-02-03 RX ADMIN — CARVEDILOL 6.25 MG: 6.25 TABLET, FILM COATED ORAL at 17:40

## 2021-02-03 RX ADMIN — OXYCODONE HYDROCHLORIDE 10 MG: 5 SOLUTION ORAL at 06:53

## 2021-02-03 RX ADMIN — OXYCODONE HYDROCHLORIDE 10 MG: 5 SOLUTION ORAL at 21:28

## 2021-02-03 RX ADMIN — Medication 1 MG: at 21:36

## 2021-02-03 RX ADMIN — LIDOCAINE HYDROCHLORIDE 30 ML: 20 SOLUTION ORAL; TOPICAL at 11:17

## 2021-02-03 RX ADMIN — SUCRALFATE 1 G: 1 SUSPENSION ORAL at 03:48

## 2021-02-03 RX ADMIN — DIPHENHYDRAMINE HYDROCHLORIDE 25 MG: 25 SOLUTION ORAL at 02:53

## 2021-02-03 RX ADMIN — ONDANSETRON 8 MG: 4 TABLET, ORALLY DISINTEGRATING ORAL at 21:29

## 2021-02-03 RX ADMIN — OXYCODONE HYDROCHLORIDE 10 MG: 5 SOLUTION ORAL at 15:43

## 2021-02-03 RX ADMIN — CARVEDILOL 6.25 MG: 6.25 TABLET, FILM COATED ORAL at 07:45

## 2021-02-03 RX ADMIN — LIDOCAINE HYDROCHLORIDE 30 ML: 20 SOLUTION ORAL; TOPICAL at 03:05

## 2021-02-03 RX ADMIN — OXYCODONE HYDROCHLORIDE 10 MG: 5 SOLUTION ORAL at 02:54

## 2021-02-03 RX ADMIN — VANCOMYCIN HYDROCHLORIDE 1750 MG: 10 INJECTION, POWDER, LYOPHILIZED, FOR SOLUTION INTRAVENOUS at 14:32

## 2021-02-03 RX ADMIN — LIDOCAINE HYDROCHLORIDE 3 ML: 10 INJECTION, SOLUTION EPIDURAL; INFILTRATION; INTRACAUDAL; PERINEURAL at 10:57

## 2021-02-03 RX ADMIN — OXYCODONE HYDROCHLORIDE 10 MG: 5 SOLUTION ORAL at 11:18

## 2021-02-03 RX ADMIN — DEXTROAMPHETAMINE SACCHARATE, AMPHETAMINE ASPARTATE, DEXTROAMPHETAMINE SULFATE AND AMPHETAMINE SULFATE 20 MG: 2.5; 2.5; 2.5; 2.5 TABLET ORAL at 07:44

## 2021-02-03 RX ADMIN — VANCOMYCIN HYDROCHLORIDE 1750 MG: 10 INJECTION, POWDER, LYOPHILIZED, FOR SOLUTION INTRAVENOUS at 03:42

## 2021-02-03 RX ADMIN — NICOTINE 1 PATCH: 14 PATCH, EXTENDED RELEASE TRANSDERMAL at 07:45

## 2021-02-03 RX ADMIN — SODIUM CHLORIDE, PRESERVATIVE FREE 5 ML: 5 INJECTION INTRAVENOUS at 05:49

## 2021-02-03 ASSESSMENT — ACTIVITIES OF DAILY LIVING (ADL)
ADLS_ACUITY_SCORE: 11

## 2021-02-03 ASSESSMENT — MIFFLIN-ST. JEOR: SCORE: 1755.54

## 2021-02-03 NOTE — CONSULTS
"    Interventional Radiology Consult Service Note    Patient is on IR schedule 2/3 for a RIJ TCVC removal.   Labs WNL for procedure. COVID neg 1/30    No NPO required.  Consent will be done prior to procedure.     Please contact the IR [place] RN at [phone] for estimated time of procedure.     Case discussed with Dr. Urbina from IR and Dr. Trejo. This is a 50 year old male with PMH significant for Celestino-en-Y gastric bypass, esophagojejunostomy c/b short gut syndrome and chronic malnutrition, and history of recurrent line-related infections, admitted 2/1 after 1.5 days of fevers at home, found to have blood cultures positive for polymicrobial deepa. IR is consulted for line removal due to infection. This line was placed 9/2020.    Of note, in the last 5 years patient has had 11 vascular access devices (port vs tunneled CVC) placed and removed at [facility], in addition to 5 PICC lines placed by the IR service. With frequent line placement and line removal, patient is at high risk for thrombosis of vessels and losing vascular access options. Recommend continued discussions regarding more durable options for nutrition.     Expected date of discharge: TBD    Vitals:   /68 (BP Location: Right arm)   Pulse 73   Temp 98.1  F (36.7  C) (Oral)   Resp 18   Ht 1.816 m (5' 11.5\")   Wt 87.8 kg (193 lb 9.6 oz)   SpO2 97%   BMI 26.63 kg/m      Pertinent Labs:     Lab Results   Component Value Date    WBC 5.8 02/03/2021    WBC 8.8 01/31/2021    WBC 8.9 01/30/2021       Lab Results   Component Value Date    HGB 11.3 02/03/2021    HGB 11.4 01/31/2021    HGB 12.4 01/30/2021       Lab Results   Component Value Date     02/03/2021     01/31/2021     01/30/2021       Lab Results   Component Value Date    INR 1.04 01/29/2021    PTT 26 07/22/2019       Lab Results   Component Value Date    POTASSIUM 3.8 02/03/2021        SAILAJA Cordova CNP  Interventional Radiology  Pager: [phone]    "

## 2021-02-03 NOTE — PROCEDURES
Parker Acevedo  [id number]    Completed removal of dual lumen tunneled central venous access catheter via RIGHT chest. Tip saved for labs. Dx: positive blood cultures; no longer desired. Yi

## 2021-02-03 NOTE — IR NOTE
Patient Name: Parker Acevedo  Medical Record Number: [id number]  Today's Date: 2/3/2021    Procedure: Tunneled line removal  Proceduralist: Chico Jamil PA-C    Procedure Start: 1050  Procedure end: 1056  Sedation medications administered: NA     Report given to: Phillip HARRISON  : YOAV    Other Notes: Pt arrived to IR room 7 from 7D. Consent reviewed. Pt denies any questions or concerns regarding procedure. Pt positioned supine and monitored per protocol. Pt tolerated procedure without any noted complications. Pt transferred back to 7D.

## 2021-02-03 NOTE — PROGRESS NOTES
[facility]     Medicine Progress Note - Hospitalist Service, Gold 8       Date of Admission:  1/29/2021  Assessment & Plan         Parker Acevedo is a 50 year old male admitted on 1/29/2021. He is a 51 yo male with PMH significant for Celestino-en-Y gastric bypass, esophagojejunostomy c/b short gut syndrome and chronic malnutrition, and history of recurrent line-related infections, here after 1.5 days of fevers at home, found to have blood cultures positive for polymicrobial deepa     #E. Faecalis, staph hominis, staph capitis, staph epidermidis, corynebacterium, and aerococcus bacteremia  #Hx of recurrent polymicrobial bacteremia  Has multiple past infections for bactermia/CLABSI. Admitted 7/28/20 with MRSE from Cm CVC (replaced 8/3/2020). Admitted again 8/31/2020 with blood cultures positive for staph lugdunensis. Cm removed and replaced at that August '20 admission. Follows with ID outpatient (Dr. Buckner). He had ~2 days of fever prior to this admission, so ID arranged blood cultures outpatient. Positive for polymicrobial deepa.  - remove Cm today  - on vancomycin; abx per ID  - Follow blood cultures     # Hx Celestino-en-Y gastric bypass, esophagojejunostomy c/b short gut syndrome and chronic malnutrition  # Chronic Abdominal Pain   Has been without G-tube for ~3 months, scheduled to be replaced.  - Continue PTA tylenol, fentanyl, oxycodone  - Holding home TPN  - Continue PTA PRN acetaminophen, fentanyl patch, and oxycodone  - Continue PTA GI cocktail PRN  - Continue PTA PRN sucralfate  - consider replacing G-tube for feeding, venting     #HTN: continue PTA coreg  # Asthma: continue albuterol inhaler  # ADHD: continue adderall   # Anxiety: continue lorazepam prn (for TPN and meds)  # GERD: continue pta PPI   # Normocytic anemia: 12.1 here, at baseline. Monitor  # paranoia and inappropriate behavior. Prn quetiapine. Psych consult    .Malnutrition:    -  Level of malnutrition: Non-Severe   - Based on: weight loss, mild (or greater) subcutaneous fat loss, mild (or greater) muscle loss        Diet: Regular Diet Adult    DVT Prophylaxis: Low Risk/Ambulatory with no VTE prophylaxis indicated  Sanchez Catheter: not present  Code Status: Full Code           Disposition Plan   Expected discharge: 2 - 3 days, recommended to prior living arrangement once line replaced and antibiotic plan established.  Entered: Sammi Trejo MD 02/03/2021, 9:01 AM       The patient's care was discussed with the Patient.    Sammi Trejo MD  Hospitalist Service, [address]   Contact information available via [facility] Paging/Directory  Please see sign in/sign out for up to date coverage information  ______________________________________________________________________    Interval History   Nursing notes reviewed. Naeon. Feeling good today. No particular complaints    Data reviewed today: I reviewed all medications, new labs and imaging results over the last 24 hours. I personally reviewed no images or EKG's today.    Physical Exam   Vital Signs: Temp: 98.1  F (36.7  C) Temp src: Oral BP: 111/68 Pulse: 73   Resp: 18 SpO2: 97 % O2 Device: None (Room air)    Weight: 193 lbs 9.6 oz  General Appearance:  Middle aged man in NAD sitting in hospital bed  Respiratory: ctab on ra, nonlabored  Cardiovascular: rrr, s1, s2, no murmurs  GI: soft, nt/nd  Skin: warm and dry, no rash on exposed skin  Neuro: alert, interactive, speech fluent, grossly nonfocal     Data   Recent Labs   Lab 02/03/21  0552 02/02/21  0605 02/01/21  0546 01/31/21  0639 01/30/21  0619 01/29/21  2312   WBC 5.8  --   --  8.8 8.9 9.4   HGB 11.3*  --   --  11.4* 12.4* 12.1*   MCV 96  --   --  90 90 89     --   --  226 243 239   INR  --   --   --   --   --  1.04    140  --  138  --  140   POTASSIUM 3.8 3.6  --  3.7  --  3.8   CHLORIDE 109 108  --  105  --  108   CO2  29 32  --  30  --  28   BUN 17 13  --  18  --  14   CR 0.82 0.87 0.73 0.82  --  0.67   ANIONGAP 4 <1*  --  4  --  5   SILAS 8.5 8.3*  --  8.9  --  8.8   GLC 84 86  --  91  --  96   ALBUMIN 3.2*  --   --   --   --  3.4   PROTTOTAL 6.2*  --   --   --   --  6.7*   BILITOTAL 0.4  --   --   --   --  1.0   ALKPHOS 63  --   --   --   --  69   ALT 27  --   --   --   --  40   AST 12  --   --   --   --  19     Recent Results (from the past 24 hour(s))   IR CVC Tunnel Removal Right    Papo Acevedo  [phone]    Completed removal of dual lumen tunneled central venous access  catheter via RIGHT chest. Tip saved for labs. Dx: positive blood  cultures; no longer desired. Omero.     Medications       amphetamine-dextroamphetamine  20 mg Oral Daily     carvedilol  6.25 mg Oral BID w/meals     fentaNYL  25 mcg Transdermal Q72H     fentaNYL   Transdermal Q8H     heparin lock flush  5-10 mL Intracatheter Q24H     nicotine  1 patch Transdermal Daily     nicotine   Transdermal Q8H     vancomycin (VANCOCIN) IV  1,750 mg Intravenous Q12H

## 2021-02-03 NOTE — TELEPHONE ENCOUNTER
Routing refill request to provider for review/approval because:  Medication is reported/historical    Irene Young Rn

## 2021-02-03 NOTE — CONSULTS
"    [facility] ID Service: Initial Consultation     Patient:  Parker Acevedo, Date of birth 1970, Medical record number [id number]  Date of Visit:  February 2, 2021  Consult Requested by: Sammi Trejo MD         Assessment and Recommendations:   Problem List:  1. 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 Port cultures from 1/28 growing: GPB resembling diphteroids, Corynebacterium, Enterococcus faecalis, Staphylococcus hominis.  B. Port cultures from 1/29 growing: Streptococcus salivarius, Streptococcus mitis, Staphylococcus hominis, Staphylococcus capitis, Staphylococcus epidermidis, Enterococcus faecalis, Aerococcus viridans, Corynebacterium jeikeium  2. Prior Celestino-en-Y gastric bypass, esophagojejunostomy  A. Complicated by short gut syndrome and chronic malnutrition requiring TPN.  3. GERD  4. HTN    Recommendations:  1. Continue vancomycin  2. Though some organisms grown on port cultures do seem likely to be contaminants, there are now several growing (some only speciated today) that are concerning for true line infection, namely Staphylococcus epidermidis, Enterococcus faecalis, and Corynebacterium.  3. Recommend removing the line and having a 48-72 hour \"line holiday\" with only PIVs as needed.   4. Continue to collect daily peripheral blood cultures.  5. If peripheral blood cultures remain clear for 72 hours, could replace line at that time  6. As noted in Dr. Buckner's note and in discussion with primary team, would be beneficial to look into possibility of getting line that allows for antibiotic or ethanol lock therapy should we need this in the future (particularly given patient's repeated episodes of line infection).    Discussion:  50yo M with complicated post Celestino-en-Y course requiring TPN for chronic malnutrition presents with likely line infection, subsequent to several prior line infections requiring replacement of line. Though some organisms growing on 1/28 and " "1/29 cultures do appear likely contaminants, there are several that are more concerning, such as Staphylococcus epidermidis, Enterococcus, and Corynebacterium. Given this, I would recommend removing the line and replacing it after a few days of \"clear culture\" line holiday, with daily blood cultures collected throughout. Agree with continuing vancomycin.    Koko Sebastian MD  Division of Infectious Diseases and International Medicine  P: [phone]        History of Present Illness:     Mr. Acevedo is a 51 yo M with a history of Celestino-en-Y gastric bypass (has lost nearly 300 pounds as a result) and esophagojejunostomy complicated by short gut syndrome and chronic malnutriion requiring TPN, as well as several incidents of polymicrobial central line infection resulting in line replacement. He presented on 1/30 for another suspected such episode.     Per patient and concurrent notes from ID colleague Dr. Buckner, Mr. Acevedo began having intermittent fevers about one week prior to 1/29. He states that, in the past, he develops fever, loose stool, and fatigue, which is similar to what he developed this time. This prompted him to contact Dr. Buckner, who then ordered blood cultures, which were collected from his port (two sets collected on both 1/28 and 1/29). When the 1/29 cultures began growing GPCs, patient was advised to come to the ED for evaluation and admission. Since that time, his cultures have grown a litany of organisms (not uncommon in his episodes), including: Streptococcus salivarius, Streptococcus mitis, Staphylococcus hominis, Stahylococcus capitis, Staphylococcus epidermidis, Enterococcus faecalis, Aerococcus viridans, Corynebacterium jeikeium (all on 1/29 sets) and GPB resembling diphteroids, Corynebacterium, Enterococcus faecalis, Staphylococcus hominis. (all on 1/28 sets).     Initially, he was started on ceftriaxone x 1 dose, and then transitioned to Linezolid x 1 dose, and since 1/31 has been on " vancomycin. Blood cultures collected on 1/31 (one peripheral and one port, though different port than 1/29 sets) have remained clear. Patient has been afebrile with normal white count throughout this admission.    Prior line infections in the past 2 years have grown: Staphylococcus lugdunensis, Psychrobacter faecalis, Corynebacterium, Staphylococcus epidermidis, Finegoldia magna, Granulicatella adiacens, Streptococcus salivarius, Rothia mucilaginosa, Candida albicans         Review of Systems:   Full 10 point ROS obtained, pertinent positives and negatives as above.       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     Complication of anesthesia      Difficulty swallowing      Gastric ulcer, unspecified as acute or chronic, without mention of hemorrhage, perforation, or obstruction      Gastro-oesophageal reflux disease      Head injury      Hiatal hernia      Other bladder disorder      Other chronic pain      PONV (postoperative nausea and vomiting)      Severe malnutrition (H)     TPN     Short gut syndrome      Tobacco abuse      Past Surgical History:   Procedure Laterality Date     AMPUTATION       APPENDECTOMY       BACK SURGERY  11/3/2014    curve in the spine     BIOPSY LYMPH NODE CERVICAL N/A 2/20/2015    Procedure: BIOPSY LYMPH NODE CERVICAL;  Surgeon: Baron Scanlon MD;  Location:  OR     C GASTRIC BYPASS,OBESE<100CM SHAYLEE-EN-Y  2002    lost 300 pounds     CHOLECYSTECTOMY       DISCECTOMY, FUSION CERVICAL ANTERIOR ONE LEVEL, COMBINED N/A 2/15/2017    Procedure: COMBINED DISCECTOMY, FUSION CERVICAL ANTERIOR ONE LEVEL;  Surgeon: Darren Campos MD;  Location:  OR     ENDOSCOPIC INSERTION TUBE GASTROSTOMY   9/9/2013    Procedure: ENDOSCOPIC INSERTION TUBE GASTROSTOMY;;  Surgeon: Francis Vyas MD;  [facility]     ENDOSCOPIC ULTRASOUND UPPER GASTROINTESTINAL TRACT (GI)  4/29/2011    Procedure:ENDOSCOPIC ULTRASOUND UPPER GASTROINTESTINAL TRACT (GI); Both Procedures done Conjointly; Surgeon:NEREIDA HOUSER; Location[facility]     ENDOSCOPIC ULTRASOUND UPPER GASTROINTESTINAL TRACT (GI)  9/9/2013    Procedure: ENDOSCOPIC ULTRASOUND UPPER GASTROINTESTINAL TRACT (GI);  Endoscopic Ultrasound Guide Gastrostomy Tube Placement  C-arm;  Surgeon: Noe Lizarraga MD;  [facility]: [facility] OR     ENDOSCOPY  03/25/11    EGD, [facility]     ENDOSCOPY  08/04/09    Upper Endoscopy, [facility]     ENDOSCOPY  01/05/09    Upper Endoscopy, [facility]     ESOPHAGOSCOPY, GASTROSCOPY, DUODENOSCOPY (EGD), COMBINED  4/20/2011    Procedure:COMBINED ESOPHAGOSCOPY, GASTROSCOPY, DUODENOSCOPY (EGD); Surgeon:FRANCIS VYAS; [facility]     ESOPHAGOSCOPY, GASTROSCOPY, DUODENOSCOPY (EGD), COMBINED  6/15/2011    Procedure:COMBINED ESOPHAGOSCOPY, GASTROSCOPY, DUODENOSCOPY (EGD); Surgeon:FRANCIS VYAS; [facility]     ESOPHAGOSCOPY, GASTROSCOPY, DUODENOSCOPY (EGD), COMBINED  6/12/2013    Procedure: COMBINED ESOPHAGOSCOPY, GASTROSCOPY, DUODENOSCOPY (EGD);;  Surgeon: Francis Vyas MD;  [facility]     ESOPHAGOSCOPY, GASTROSCOPY, DUODENOSCOPY (EGD), COMBINED  11/22/2013    Procedure: COMBINED ESOPHAGOSCOPY, GASTROSCOPY, DUODENOSCOPY (EGD);;  Surgeon: Francis Vyas MD;  [facility]     ESOPHAGOSCOPY, GASTROSCOPY, DUODENOSCOPY (EGD), COMBINED  4/30/2014    Procedure: COMBINED ESOPHAGOSCOPY, GASTROSCOPY, DUODENOSCOPY (EGD);  Surgeon: Francis Vyas MD;  [facility]     ESOPHAGOSCOPY, GASTROSCOPY, DUODENOSCOPY (EGD), COMBINED N/A 2/20/2015    Procedure: COMBINED ESOPHAGOSCOPY, GASTROSCOPY, DUODENOSCOPY (EGD), BIOPSY SINGLE OR MULTIPLE;  Surgeon: Baron Scanlon MD;  Location:  [facility]     ESOPHAGOSCOPY, GASTROSCOPY, DUODENOSCOPY (EGD), COMBINED N/A 9/30/2015    Procedure: COMBINED ESOPHAGOSCOPY, GASTROSCOPY, DUODENOSCOPY (EGD);  Surgeon: Francis Vyas MD;  Location:  GI     ESOPHAGOSCOPY, GASTROSCOPY, DUODENOSCOPY (EGD), COMBINED N/A 10/3/2019    Procedure: Upper Endoscopy;  Surgeon: Clif Morrow MD;  [facility]     GASTRECTOMY  6/22/2012    Procedure: GASTRECTOMY;  Open Approach, Excise Ulcers,Partial Gastrectomy, Esophagojejunostomy, Hiatal Hernia Repair, Extensive Lysis of Adhesions and Esaphagogastrodudenoscopy.;  Surgeon: Francis Vyas MD;  Location: [facility]     GASTROJEJUNOSTOMY  08/26/09    Extensice enterolysis, partial resect. jejunum, part. resect gastric pouch, gastrojejunostomy anastomosis     HC ESOPH/GAS REFLUX TEST W NASAL IMPED ELECTRODE  8/5/2013    Procedure: ESOPHAGEAL IMPEDENCE FUNCTION TEST 1 HOUR OR LESS;  Surgeon: Halie Lang MD;  Location:  GI     HEAD & NECK SURGERY  2/15/2017    C5-C6     HERNIA REPAIR  2006    Umbilical hernia     HERNIORRHAPHY HIATAL  6/22/2012    Procedure: HERNIORRHAPHY HIATAL;;  Surgeon: Francis Vyas MD;  Location:  OR     HERNIORRHAPHY INGUINAL  11/22/2013    Procedure: HERNIORRHAPHY INGUINAL;;  Surgeon: Francis Vyas MD;  Location: [facility]     INSERT PICC LINE Right 12/19/2019    Procedure: Picc Placement;  Surgeon: Per Dumont PA-C;  [facility]     INSERT PICC LINE Right 2/21/2020    Procedure: INSERTION, PICC;  Surgeon: Per Dumont PA-C;  [facility]     INSERT PORT VASCULAR ACCESS Right 12/19/2017    Procedure: INSERT PORT VASCULAR ACCESS;  Right Chest Port Placement ;  Surgeon: Lisandro Alejandro PA-C;  [facility]     INSERT PORT VASCULAR ACCESS Right 8/2/2018    Procedure: INSERT PORT VASCULAR ACCESS;  Place single lumen tunneled central venous access catheter;  Surgeon: Guy Jamil PA-C;  [facility]     IR CVC TUNNEL PLACEMENT >  5 YRS OF AGE  8/7/2019     IR CVC TUNNEL PLACEMENT > 5 YRS OF AGE  4/14/2020     IR CVC TUNNEL PLACEMENT > 5 YRS OF AGE  8/3/2020     IR CVC TUNNEL PLACEMENT > 5 YRS OF AGE  9/4/2020     IR CVC TUNNEL REMOVAL RIGHT  10/1/2019     IR CVC TUNNEL REMOVAL RIGHT  7/30/2020     IR CVC TUNNEL REMOVAL RIGHT  9/2/2020     IR FOLLOW UP VISIT OUTPATIENT  8/7/2019     IR PICC PLACEMENT > 5 YRS OF AGE  3/7/2019     IR PICC PLACEMENT > 5 YRS OF AGE  12/19/2019     IR PICC PLACEMENT > 5 YRS OF AGE  2/21/2020     LAPAROTOMY EXPLORATORY  11/22/2013    Procedure: LAPAROTOMY EXPLORATORY;  Exploratory Laparotomy, Upper Endoscopy, Left Inguinal Hernia Repair;  Surgeon: Francis Vyas MD;  [facility]     ORTHOPEDIC SURGERY       PICC INSERTION Right 03/16/2017    5fr DL BioFlo PICC, 42cm (3cm external) in the R medial brachial vein w/ tip in the SVC RA junction.     PICC INSERTION Left 09/23/2017    5fr DL BioFlo PICC, 45cm (1cm external) in the L basilic vein w/ tip in the SVC RA junction.     PICC INSERTION Right 05/16/2019    5Fr - 43cm, Medial brachial vein, low SVC     PICC INSERTION Right 10/02/2019    5Fr - 43cm (2cm external), basilic vein, low SVC     SHAYLEE EN Y BOWEL  2003     SOFT TISSUE SURGERY       THORACIC SURGERY       TONSILLECTOMY       TRANSESOPHAGEAL ECHOCARDIOGRAM INTRAOPERATIVE N/A 1/8/2019    Procedure: TRANSESOPHAGEAL ECHOCARDIOGRAM INTRAOPERATIVE;  Surgeon: GENERIC ANESTHESIA PROVIDER;  Location: [facility]         Allergies:      Allergies   Allergen Reactions     Bactrim [Sulfamethoxazole W/Trimethoprim] Rash     Penicillins Anaphylaxis     Please see Antimicrobial Management Team allergy assessment note 10/10/2018. Patient reported tolerating amoxicillin.     Doxycycline Rash     Vancomycin Rash     Rash after receiving vancomycin 3/28/16 (red man's?). Tolerated with slower infusion and diphenhydramine premed.            Current Antimicrobials:     Vancomycin       Family History:     Family History    Problem Relation Age of Onset     Gastrointestinal Disease Mother         Crohns disease     Anxiety Disorder Mother      Thyroid Disease Mother         Grave's disease     Cancer Father         ear cancer-skin cancer/melanoma     Breast Cancer Maternal Grandmother      Macular Degeneration Maternal Grandfather      Anxiety Disorder Sister      Diabetes Maternal Uncle      Breast Cancer Other      Hypertension No family hx of      Hyperlipidemia No family hx of      Cerebrovascular Disease No family hx of      Prostate Cancer No family hx of      Depression No family hx of      Anesthesia Reaction No family hx of      Asthma No family hx of      Osteoporosis No family hx of      Genetic Disorder No family hx of      Obesity No family hx of      Mental Illness No family hx of      Substance Abuse No family hx of      Glaucoma No family hx of         Social History:     Social History     Socioeconomic History     Marital status:      Spouse name: Rose     Number of children: 1     Years of education: Not on file     Highest education level: GED or equivalent   Occupational History     Not on file   Social Needs     Financial resource strain: Not hard at all     Food insecurity     Worry: Never true     Inability: Never true     Transportation needs     Medical: No     Non-medical: No   Tobacco Use     Smoking status: Current Some Day Smoker     Packs/day: 1.00     Years: 6.00     Pack years: 6.00     Types: Cigarettes     Smokeless tobacco: Never Used   Substance and Sexual Activity     Alcohol use: No     Frequency: Never     Drinks per session: Patient refused     Binge frequency: Never     Comment: quit 2002     Drug use: No     Sexual activity: Yes     Partners: Female     Birth control/protection: None     Comment: no protection   Lifestyle     Physical activity     Days per week: 2 days     Minutes per session: 10 min     Stress: Only a little   Relationships     Social connections     Talks on  phone: Once a week     Gets together: Never     Attends Worship service: Never     Active member of club or organization: No     Attends meetings of clubs or organizations: Never     Relationship status:      Intimate partner violence     Fear of current or ex partner: No     Emotionally abused: No     Physically abused: No     Forced sexual activity: No   Other Topics Concern     Parent/sibling w/ CABG, MI or angioplasty before 65F 55M? No   Social History Narrative     Not on file            Physical Exam:   Ranges forvital signs:  Temp:  [96.3  F (35.7  C)-97.7  F (36.5  C)] 97.1  F (36.2  C)  Pulse:  [56-77] 74  Resp:  [16] 16  BP: ()/(56-78) 107/74  SpO2:  [97 %-100 %] 98 %    Intake/Output Summary (Last 24 hours) at 2/2/2021 1906  Last data filed at 2/2/2021 1400  Gross per 24 hour   Intake 1520 ml   Output --   Net 1520 ml       Exam:  GENERAL:  well-developed, well-nourished, sitting in bed in no acute distress.   ENT:  Head is normocephalic, atraumatic. Oropharynx is moist without exudates or ulcers.  EYES:  Eyes have anicteric sclerae.    NECK:  Supple.  LUNGS:  Clear to auscultation.  CARDIOVASCULAR:  Regular rate and rhythm with no murmurs, gallops or rubs.  ABDOMEN:  Normal bowel sounds, soft, nontender except around former PEG site. Bandaged site of former PEG present without noticeable drainage, erythema.   EXT: Extremities warm and without edema.  SKIN:  No acute rashes.  Line is in place without any surrounding erythema. Hickmann catheter present on right chest wall without any surrounding erythema, induration, crepitus, or tenderness.  NEUROLOGIC:  Grossly nonfocal.         Laboratory Data:     Inflammatory Markers    Recent Labs   Lab Test 01/29/21  2312 12/14/20  1415 11/17/20  1445 07/28/20  1607 05/05/20  1218 04/28/20  1245 11/02/19  1853 10/30/19  1330 05/14/19  1145 05/14/19  1145 01/23/18  0845 01/23/18  0845 01/20/18  1030 09/24/17  1900 09/24/17  1900 06/28/17  2223  06/28/17  2222 03/12/17  0351 11/01/16  1530 11/01/16  1530   SED  --   --   --  10  --   --   --   --   --  13  --  10 11  --  31*  --  26* 17*  --  27*   CRP <2.9 <2.9 <2.9 <2.9 <2.9 <2.9 <2.9 <2.9   < >  --    < > <2.9 5.4   < > 26.6*   < > 53.0* 3.4   < > 8.4*    < > = values in this interval not displayed.       Hematology Studies    Recent Labs   Lab Test 01/31/21  0639 01/30/21  0619 01/29/21  2312 01/28/21  1730 01/26/21  1230 01/12/21  1300 12/29/20  1015 12/14/20  1415 11/17/20  1445 11/17/20  1445 09/01/20  0550 09/01/20  0550 08/31/20  1822 07/28/20  1607 07/28/20  1607   WBC 8.8 8.9 9.4 7.7 5.9 6.5 6.7 6.7   < > 4.5   < > 7.6 9.2   < > 6.8   ANEU  --   --  5.7 4.4 3.6 3.0 4.2 3.2   < > 2.2   < > 4.0 6.1   < > 4.0   AEOS  --   --  0.1 0.1  --   --   --   --   --  0.2  --  0.2 0.1  --  0.1   HGB 11.4* 12.4* 12.1* 12.2* 11.5* 10.2* 11.1* 10.1*   < > 9.5*   < > 11.3* 11.9*   < > 12.9*   MCV 90 90 89 90 89 89 86 89   < > 92   < > 92 91   < > 92    243 239 244 211 181 211 165   < > 129*   < > 192 220   < > 189    < > = values in this interval not displayed.     Metabolic Studies     Recent Labs   Lab Test 02/02/21  0605 02/01/21  0546 01/31/21  0639 01/29/21  2312 01/26/21  1230 01/12/21  1300     --  138 140 144 140   POTASSIUM 3.6  --  3.7 3.8 3.4 3.7   CHLORIDE 108  --  105 108 111* 107   CO2 32  --  30 28 29 30   BUN 13  --  18 14 8 15   CR 0.87 0.73 0.82 0.67 0.52* 0.91   GFRESTIMATED >90 >90 >90 >90 >90 >90     Hepatic Studies    Recent Labs   Lab Test 01/29/21  2312 01/26/21  1230 01/12/21  1300 12/29/20  1015 12/14/20  1415 12/08/20  1110   BILITOTAL 1.0 0.3 0.7 0.3 0.5 0.6   ALKPHOS 69 66 80 71 70 69   ALBUMIN 3.4 3.2* 3.8 3.2* 3.4 3.2*   AST 19 22 21 12 16 10   ALT 40 30 25 17 16 16       Microbiology:  Culture Micro   Date Value Ref Range Status   01/30/2021 No growth after 3 days  Preliminary   01/30/2021 No growth after 3 days  Preliminary   01/29/2021   Preliminary     (Note)  POSITIVE for Staphylococci other than S.aureus, S.epidermidis and  S.lugdunensis, by PhytoCeuticaigene multiplex nucleic acid test.  Coagulase-negative staphylococci are the most common venipuncture or  collection associated skin CONTAMINANTS grown in blood cultures.  Final identification and antimicrobial susceptibility testing will be  verified by standard methods.    Specimen tested with Verigene multiplex, gram-positive blood culture  nucleic acid test for the following targets: Staph aureus, Staph  epidermidis, Staph lugdunensis, other Staph species, Enterococcus  faecalis, Enterococcus faecium, Streptococcus species, S. agalactiae,  S. anginosus grp., S. pneumoniae, S. pyogenes, Listeria sp., mecA  (methicillin resistance) and Melvin/B (vancomycin resistance).    Critical Value/Significant Value called to and read back by MARTHA PAULINO RN @ 1/30/21 2231      01/29/2021 (A)  Preliminary    Cultured on the 1st day of incubation:  Staphylococcus hominis  Susceptibility testing done on previous specimen     01/29/2021 (A)  Preliminary    Cultured on the 1st day of incubation:  Staphylococcus capitis  Culture in progress     01/29/2021   Preliminary    Critical Value/Significant Value, preliminary result only, called to and read back by  Martha Paulino RN 01/30/21 @1939 [facility]     01/29/2021 (A)  Preliminary    Cultured on the 1st day of incubation:  Staphylococcus epidermidis  Susceptibility testing in progress     01/29/2021 (A)  Preliminary    Cultured on the 1st day of incubation:  Enterococcus faecalis  Susceptibility testing done on previous specimen     01/29/2021 (A)  Preliminary    Cultured on the 1st day of incubation:  Aerococcus viridans  Susceptibility testing in progress     01/29/2021 (A)  Preliminary    Cultured on the 1st day of incubation:  Corynebacterium jeikeium  Culture in progress     01/29/2021   Preliminary    Critical Value/Significant Value, preliminary result only, called to and read back  by  Phillip Bowden RN [facility] at 1328 on 2.2.21 rd.     01/29/2021 (A)  Preliminary    Cultured on the 1st day of incubation:  Streptococcus salivarius group     01/29/2021   Preliminary    Critical Value/Significant Value, preliminary result only, called to and read back by  POLINA VAUGHN RN 01/30/21 1326 [facility].     01/29/2021 (A)  Preliminary    Cultured on the 1st day of incubation:  Streptococcus mitis group     01/29/2021 Susceptibility testing in progress  Preliminary   01/29/2021   Preliminary    Critical Value/Significant Value, preliminary result only, called to and read back by  Phillip Bowden RN [facility] at 1328 on 2.2.21 rd.     01/28/2021 (A)  Preliminary    Cultured on the 2nd day of incubation:  Gram positive bacilli resembling diphtheroids     01/28/2021   Preliminary    Critical Value/Significant Value, preliminary result only, called to and read back by  Jeet Paulino RN 01/30/21 @2007 [facility]     01/28/2021 (A)  Preliminary    Cultured on the 2nd day of incubation:  Corynebacterium species  Identification obtained by MALDI-TOF mass spectrometry research use only database. Test   characteristics determined and verified by the [facility]   ([facility]) [place], [place].     01/28/2021 Culture in progress  Preliminary   01/28/2021   Preliminary    Critical Value/Significant Value, preliminary result only, called to and read back by  Phillip Bowden RN [facility] at 1328 on 2.2.21 rd.     01/28/2021 (A)  Preliminary    Cultured on the 1st day of incubation:  Enterococcus faecalis     01/28/2021   Preliminary    Critical Value/Significant Value, preliminary result only, called to and read back by   at 1325 1.29.21 KZ     01/28/2021 (A)  Preliminary    Cultured on the 1st day of incubation:  Staphylococcus hominis     01/28/2021   Preliminary    Critical Value/Significant Value, preliminary result only, called to and read back by  Dr. Buckner 1915 01.29.2021 NM     01/28/2021   Preliminary     (Note)  POSITIVE for ENTEROCOCCUS FAECALIS and NEGATIVE for Melvin/vanB genes  by Verigene multiplex nucleic acid test. Final identification and  antimicrobial susceptibility testing will be verified by standard  methods.    Specimen tested with Verigene multiplex, gram-positive blood culture  nucleic acid test for the following targets: Staph aureus, Staph  epidermidis, Staph lugdunensis, other Staph species, Enterococcus  faecalis, Enterococcus faecium, Streptococcus species, S. agalactiae,  S. anginosus grp., S. pneumoniae, S. pyogenes, Listeria sp., mecA  (methicillin resistance) and Melvin/B (vancomycin resistance).    Critical Value/Significant Value called to and read back by Dr. Buckner, 1.29.21 @ 1555 pt.    POSITIVE for Staphylococci other than S.aureus, S.epidermidis and  S.lugdunensis, by Verigene multiplex nucleic acid test.  Coagulase-negative staphylococci are the most common venipuncture or  collection associated skin CONTAMINANTS grown in blood cultures.  Final identification and antimicrobial susceptibility testing will be  verified by standard methods.    Specimen tested with Verigene multiplex, gram-positive blood culture  nucleic acid test for the following targets: Staph aureus, Staph  epidermidis, Staph lugdunensis, other Staph species, Enterococcus  faecalis, Enterococcus faecium, Streptococcus species, S. agalactiae,  S. anginosus grp., S. pneumoniae, S. pyogenes, Listeria sp., mecA  (methicillin resistance) and Melvin/B (vancomycin resistance).    Critical Value/Significant Value called to and read back by Dr. Buckner  1917 01.29.2021 NM     11/17/2020 No growth  Final   11/17/2020 No growth  Final   11/17/2020 No growth  Final   10/29/2020 No growth  Final   10/29/2020 No growth  Final   09/03/2020 No growth  Final   09/03/2020 No growth  Final   09/02/2020 No growth  Final   09/02/2020 No growth  Final   09/02/2020 No growth  Final   09/02/2020 No growth  Final   09/02/2020 No growth  Final    09/01/2020 No growth  Final   09/01/2020 No growth  Final   08/31/2020 No growth  Final   08/31/2020 (A)  Final    Cultured on the 1st day of incubation:  Staphylococcus lugdunensis     08/31/2020   Final    Critical Value/Significant Value, preliminary result only, called to and read back by  Penny Alvarado RN @2200 09/01/2020 [facility]     08/29/2020 (A)  Final    Cultured on the 1st day of incubation:  Gram positive rods  Unable to further identify.     08/29/2020   Final    Critical Value/Significant Value, preliminary result only, called to and read back by  Dr. Sara Warren 2038 8/30/20 AM     08/29/2020 (A)  Final    Cultured on the 1st day of incubation:  Psychrobacter faecalis  Previously reported as:  Gram positive cocci  CORRECTED ON:  9.2.2020 at 1432. KVO  CORRECTED ON 09/11 AT 1505: PREVIOUSLY REPORTED AS Cultured on the 1st day of incubation:   Psychrobacter species Further speciated as: Psychrobacter faecalis Previously reported as:   Gram positive cocci CORRECTED ON: 9.2.2020 at 1432. KVO     08/29/2020   Final    Critical Value/Significant Value, preliminary result only, called to and read back by  Sandie Burroughs RN at 1412 9.1.20.DK     08/29/2020   Final    Corrected report called to and read back by  Sandie Burroughs RN on 9.2.2020 at 1432. KVO     08/29/2020   Final    (Note)  NEGATIVE for the following: Staphylococcus spp., Staph aureus, Staph  epidermidis, Staph lugdunensis, Streptococcus spp., Strep pneumoniae,  Strep pyogenes, Strep agalactiae, Strep anginosus group, Enterococcus  faecalis, Enterococcus faecium, and Listeria spp. by [employer]igene  multiplex nucleic acid test. Final identification and antimicrobial  susceptibility testing will be verified by standard methods.    Specimen tested with Verigene multiplex, gram-positive blood culture  nucleic acid test for the following targets: Staph aureus, Staph  epidermidis, Staph lugdunensis, other Staph species, Enterococcus  faecalis, Enterococcus faecium,  Streptococcus species, S. agalactiae,  S. anginosus grp., S. pneumoniae, S. pyogenes, Listeria sp., mecA  (methicillin resistance) and Melvin/B (vancomycin resistance).    Critical Value/Significant Value called to and read back by  Dr. Sara Warren 2038 8/30/20 AM       08/28/2020 No growth  Final   08/28/2020 No growth  Final   07/30/2020 (A)  Final    <15 colonies   Staphylococcus epidermidis  Susceptibility testing not routinely done     07/29/2020 No growth  Final   07/29/2020 No growth  Final   07/28/2020 No growth  Final   07/28/2020 (A)  Final    Cultured on the 2nd day of incubation:  Gram positive cocci in clusters  Upon further review, there is not any gram positive cocci in clusters present in this   blood culture.     07/28/2020 (A)  Final    Cultured on the 2nd day of incubation:  Corynebacterium species  Identification obtained by MALDI-TOF mass spectrometry research use only database. Test   characteristics determined and verified by the [facility]   ([facility]) [place], [place].     07/28/2020   Final    Critical Value/Significant Value, preliminary result only, called to and read back by  Richie Nugent RN 2223 7/30/20 AM     07/28/2020   Final    Updated report called to and read back by  Spring Lugo RN at 1300 on 8.2.20 ME     07/28/2020   Final    (Note)  NEGATIVE for the following: Staphylococcus spp., Staph aureus, Staph  epidermidis, Staph lugdunensis, Streptococcus spp., Strep pneumoniae,  Strep pyogenes, Strep agalactiae, Strep anginosus group, Enterococcus  faecalis, Enterococcus faecium, and Listeria spp. by [employer]  multiplex nucleic acid test. Final identification and antimicrobial  susceptibility testing will be verified by standard methods.    Critical Value/Significant Value called to and read back by LIZET BAUGH RN [id number] 0119 07.31.20 CF     07/26/2020 (A)  Final    Cultured on the 1st day of incubation:  Staphylococcus epidermidis  Susceptibility testing done  on previous specimen     07/26/2020   Final    Critical Value/Significant Value, preliminary result only, called to and read back by  Neha Roman [facility] 7.27.20 1735. BRANDON     07/26/2020 (A)  Final    Cultured on the 1st day of incubation:  Strain 2  Staphylococcus epidermidis  Susceptibility testing done on previous specimen     07/26/2020 (A)  Final    Cultured on the 1st day of incubation:  Staphylococcus epidermidis     07/26/2020   Final    Critical Value/Significant Value, preliminary result only, called to and read back by  Yaa Jarquin RN [facility] 7.27.20 1616. BRANDON     07/26/2020 (A)  Final    Cultured on the 1st day of incubation:  Strain 2  Staphylococcus epidermidis     07/26/2020   Final    (Note)  POSITIVE for STAPHYLOCOCCUS EPIDERMIDIS and POSITIVE for the mecA  gene (resistant to methicillin) by [employer] multiplex nucleic acid  test. Final identification and antimicrobial susceptibility testing  will be verified by standard methods.    Specimen tested with Verigene multiplex, gram-positive blood culture  nucleic acid test for the following targets: Staph aureus, Staph  epidermidis, Staph lugdunensis, other Staph species, Enterococcus  faecalis, Enterococcus faecium, Streptococcus species, S. agalactiae,  S. anginosus grp., S. pneumoniae, S. pyogenes, Listeria sp., mecA  (methicillin resistance) and Melvin/B (vancomycin resistance).    Critical Value/Significant Value called to and read back by Chantale Jarrett RN. @2003. 7.27.20. BS.        04/03/2020 No growth  Final   04/03/2020 No growth  Final   11/02/2019 No growth  Final   11/02/2019 No growth  Final   10/30/2019 No growth  Final   10/30/2019 (A)  Final    Cultured on the 3rd day of incubation:  Corynebacterium species  Identification obtained by MALDI-TOF mass spectrometry research use only database. Test   characteristics determined and verified by the [facility]   ([facility]) [place], [place].     10/30/2019    Final    Critical Value/Significant Value, preliminary result only, called to and read back by   DR VYAS @ 1745. 11/1/2019 AV     10/30/2019 (A)  Final    Cultured on the 4th day of incubation:  Finegoldia magna (Peptostreptococcus otto)     10/30/2019   Final    Critical Value/Significant Value, preliminary result only, called to and read back by  DANI SANCHES RN 0200 11.3.19 ND     10/30/2019   Final    (Note)  NEGATIVE for the following: Staphylococcus spp., Staph aureus, Staph  epidermidis, Staph lugdunensis, Streptococcus spp., Strep pneumoniae,  Strep pyogenes, Strep agalactiae, Strep anginosus group, Enterococcus  faecalis, Enterococcus faecium, and Listeria spp. by Verigene  multiplex nucleic acid test. Final identification and antimicrobial  susceptibility testing will be verified by standard methods.    Critical Value/Significant Value called to and read back by  Francis Vyas MD @ 1745. 11/1/19. AV.           NEGATIVE for the following: Staphylococcus spp., Staph aureus, Staph  epidermidis, Staph lugdunensis, Streptococcus spp., Strep pneumoniae,  Strep pyogenes, Strep agalactiae, Strep anginosus group, Enterococcus  faecalis, Enterococcus faecium, and Listeria spp. by Verigene  multiplex nucleic acid test. Final identification and antimicrobial  susceptibility testing will be verified by standard methods.    Critical Value/Significant Value called to and read back by DANI SANCHES, CHRISTY 0503 11.3.19 ND     10/29/2019 No growth  Final   10/17/2019 No growth  Final   10/17/2019 No growth  Final   10/01/2019 No growth  Final   10/01/2019 No growth  Final   09/30/2019 No growth  Final   09/29/2019 No growth  Final   09/29/2019 No growth  Final   09/27/2019 (A)  Final    Cultured on the 1st day of incubation:  Granulicatella adiacens     09/27/2019   Final    Critical Value/Significant Value, preliminary result only, called to and read back by  Dr Vyas on 9.28.19 at 1632 bw     09/27/2019 (A)  Final     Cultured on the 1st day of incubation:  Staphylococcus epidermidis     09/27/2019   Final    Critical Value/Significant Value, preliminary result only, called to and read back by  paged to [facility] on 9.28.19 at 2327      09/27/2019 (A)  Final    Cultured on the 1st day of incubation:  Streptococcus salivarius group     09/27/2019   Final    (Note)  POSITIVE for STREPTOCOCCUS SPECIES OTHER THAN pneumococcus, anginosus  group, S. pyogenes and S. agalactiae. Performed using [employer]  multiplex nucleic acid test. Final identification and antimicrobial  susceptibility testing will be verified by standard methods.    POSITIVE for STAPHYLOCOCCUS EPIDERMIDIS and POSITIVE for the mecA  gene (resistant to methicillin) by Verigene multiplex nucleic acid  test. Final identification and antimicrobial susceptibility testing  will be verified by standard methods.    Specimen tested with Verigene multiplex, gram-positive blood culture  nucleic acid test for the following targets: Staph aureus, Staph  epidermidis, Staph lugdunensis, other Staph species, Enterococcus  faecalis, Enterococcus faecium, Streptococcus species, S. agalactiae,  S. anginosus grp., S. pneumoniae, S. pyogenes, Listeria sp., mecA  (methicillin resistance) and Melvin/B (vancomycin resistance).    Critical Value/Significant Value called to and read back by Estelle Hampton RN 9.27.19 @ 0227 AV     09/27/2019 No growth  Final   07/10/2019 No growth  Final   07/10/2019 No growth  Final   05/24/2019 No growth  Final   05/24/2019 No growth  Final   05/14/2019 No growth  Final   05/14/2019 No growth  Final   05/14/2019 No growth  Final   05/12/2019 No growth  Final     Vancomycin Levels    Recent Labs   Lab Test 02/02/21  1306 01/31/21  1249 09/02/20  1049   VANCOMYCIN 15.9 13.0 24.3          Imaging:     Reviewed. No recent imaging.

## 2021-02-03 NOTE — PROGRESS NOTES
0700 - 1400    VSS. A&O x 4. Pleasant affect. Denies SOB or Nausea. Ambulates IND in halls. States pain in left abdomen, wrapping into back. Oxycodone & GI cocktail given for pain with some relief. Abdominal fistula changed/cleaned twice. Pt showered with set up only. Cm's removed d/t (+) E.Faecalis. New PIV inserted in left forearm.

## 2021-02-04 LAB
ANION GAP SERPL CALCULATED.3IONS-SCNC: 5 MMOL/L (ref 3–14)
BACTERIA SPEC CULT: ABNORMAL
BUN SERPL-MCNC: 16 MG/DL (ref 7–30)
CALCIUM SERPL-MCNC: 8.4 MG/DL (ref 8.5–10.1)
CHLORIDE SERPL-SCNC: 109 MMOL/L (ref 94–109)
CO2 SERPL-SCNC: 28 MMOL/L (ref 20–32)
CREAT SERPL-MCNC: 0.81 MG/DL (ref 0.66–1.25)
ERYTHROCYTE [DISTWIDTH] IN BLOOD BY AUTOMATED COUNT: 19.9 % (ref 10–15)
GFR SERPL CREATININE-BSD FRML MDRD: >90 ML/MIN/{1.73_M2}
GLUCOSE SERPL-MCNC: 86 MG/DL (ref 70–99)
HCT VFR BLD AUTO: 33.7 % (ref 40–53)
HGB BLD-MCNC: 10.3 G/DL (ref 13.3–17.7)
MCH RBC QN AUTO: 28.5 PG (ref 26.5–33)
MCHC RBC AUTO-ENTMCNC: 30.6 G/DL (ref 31.5–36.5)
MCV RBC AUTO: 93 FL (ref 78–100)
PLATELET # BLD AUTO: 150 10E9/L (ref 150–450)
POTASSIUM SERPL-SCNC: 3.7 MMOL/L (ref 3.4–5.3)
RBC # BLD AUTO: 3.61 10E12/L (ref 4.4–5.9)
SODIUM SERPL-SCNC: 142 MMOL/L (ref 133–144)
SPECIMEN SOURCE: ABNORMAL
VANCOMYCIN SERPL-MCNC: 19.2 MG/L
WBC # BLD AUTO: 5.6 10E9/L (ref 4–11)

## 2021-02-04 PROCEDURE — 250N000009 HC RX 250: Performed by: STUDENT IN AN ORGANIZED HEALTH CARE EDUCATION/TRAINING PROGRAM

## 2021-02-04 PROCEDURE — 250N000013 HC RX MED GY IP 250 OP 250 PS 637: Performed by: STUDENT IN AN ORGANIZED HEALTH CARE EDUCATION/TRAINING PROGRAM

## 2021-02-04 PROCEDURE — 85027 COMPLETE CBC AUTOMATED: CPT | Performed by: STUDENT IN AN ORGANIZED HEALTH CARE EDUCATION/TRAINING PROGRAM

## 2021-02-04 PROCEDURE — 250N000011 HC RX IP 250 OP 636: Performed by: NURSE PRACTITIONER

## 2021-02-04 PROCEDURE — 99233 SBSQ HOSP IP/OBS HIGH 50: CPT | Performed by: STUDENT IN AN ORGANIZED HEALTH CARE EDUCATION/TRAINING PROGRAM

## 2021-02-04 PROCEDURE — 87040 BLOOD CULTURE FOR BACTERIA: CPT | Performed by: STUDENT IN AN ORGANIZED HEALTH CARE EDUCATION/TRAINING PROGRAM

## 2021-02-04 PROCEDURE — 120N000002 HC R&B MED SURG/OB UMMC

## 2021-02-04 PROCEDURE — 80048 BASIC METABOLIC PNL TOTAL CA: CPT | Performed by: STUDENT IN AN ORGANIZED HEALTH CARE EDUCATION/TRAINING PROGRAM

## 2021-02-04 PROCEDURE — 80202 ASSAY OF VANCOMYCIN: CPT | Performed by: STUDENT IN AN ORGANIZED HEALTH CARE EDUCATION/TRAINING PROGRAM

## 2021-02-04 PROCEDURE — 250N000013 HC RX MED GY IP 250 OP 250 PS 637: Performed by: INTERNAL MEDICINE

## 2021-02-04 PROCEDURE — 36415 COLL VENOUS BLD VENIPUNCTURE: CPT | Performed by: STUDENT IN AN ORGANIZED HEALTH CARE EDUCATION/TRAINING PROGRAM

## 2021-02-04 PROCEDURE — 258N000003 HC RX IP 258 OP 636: Performed by: INTERNAL MEDICINE

## 2021-02-04 PROCEDURE — 250N000011 HC RX IP 250 OP 636: Performed by: INTERNAL MEDICINE

## 2021-02-04 PROCEDURE — 99232 SBSQ HOSP IP/OBS MODERATE 35: CPT | Performed by: STUDENT IN AN ORGANIZED HEALTH CARE EDUCATION/TRAINING PROGRAM

## 2021-02-04 PROCEDURE — 999N000127 HC STATISTIC PERIPHERAL IV START W US GUIDANCE

## 2021-02-04 RX ORDER — DIPHENHYDRAMINE HCL 12.5MG/5ML
25 LIQUID (ML) ORAL EVERY 12 HOURS
Status: DISCONTINUED | OUTPATIENT
Start: 2021-02-04 | End: 2021-02-05 | Stop reason: HOSPADM

## 2021-02-04 RX ADMIN — DIPHENHYDRAMINE HYDROCHLORIDE 25 MG: 25 SOLUTION ORAL at 02:33

## 2021-02-04 RX ADMIN — DEXTROAMPHETAMINE SACCHARATE, AMPHETAMINE ASPARTATE, DEXTROAMPHETAMINE SULFATE AND AMPHETAMINE SULFATE 20 MG: 2.5; 2.5; 2.5; 2.5 TABLET ORAL at 08:00

## 2021-02-04 RX ADMIN — Medication 1 MG: at 21:32

## 2021-02-04 RX ADMIN — OXYCODONE HYDROCHLORIDE 10 MG: 5 SOLUTION ORAL at 21:32

## 2021-02-04 RX ADMIN — SUCRALFATE 1 G: 1 SUSPENSION ORAL at 02:43

## 2021-02-04 RX ADMIN — VANCOMYCIN HYDROCHLORIDE 1750 MG: 10 INJECTION, POWDER, LYOPHILIZED, FOR SOLUTION INTRAVENOUS at 16:26

## 2021-02-04 RX ADMIN — ONDANSETRON 8 MG: 4 TABLET, ORALLY DISINTEGRATING ORAL at 21:32

## 2021-02-04 RX ADMIN — OXYCODONE HYDROCHLORIDE 10 MG: 5 SOLUTION ORAL at 15:42

## 2021-02-04 RX ADMIN — OXYCODONE HYDROCHLORIDE 10 MG: 5 SOLUTION ORAL at 11:24

## 2021-02-04 RX ADMIN — QUETIAPINE FUMARATE 50 MG: 50 TABLET ORAL at 21:32

## 2021-02-04 RX ADMIN — VANCOMYCIN HYDROCHLORIDE 1750 MG: 10 INJECTION, POWDER, LYOPHILIZED, FOR SOLUTION INTRAVENOUS at 03:10

## 2021-02-04 RX ADMIN — CARVEDILOL 6.25 MG: 6.25 TABLET, FILM COATED ORAL at 17:49

## 2021-02-04 RX ADMIN — LIDOCAINE HYDROCHLORIDE 30 ML: 20 SOLUTION ORAL; TOPICAL at 07:17

## 2021-02-04 RX ADMIN — OXYCODONE HYDROCHLORIDE 10 MG: 5 SOLUTION ORAL at 07:15

## 2021-02-04 RX ADMIN — CARVEDILOL 6.25 MG: 6.25 TABLET, FILM COATED ORAL at 08:00

## 2021-02-04 RX ADMIN — DIPHENHYDRAMINE HYDROCHLORIDE 25 MG: 25 SOLUTION ORAL at 15:42

## 2021-02-04 RX ADMIN — OXYCODONE HYDROCHLORIDE 10 MG: 5 SOLUTION ORAL at 02:33

## 2021-02-04 RX ADMIN — NICOTINE 1 PATCH: 14 PATCH, EXTENDED RELEASE TRANSDERMAL at 08:02

## 2021-02-04 RX ADMIN — NICOTINE 7 MG/24 HR DAILY TRANSDERMAL PATCH 1 PATCH: at 11:26

## 2021-02-04 ASSESSMENT — ACTIVITIES OF DAILY LIVING (ADL)
ADLS_ACUITY_SCORE: 11

## 2021-02-04 NOTE — PROGRESS NOTES
"    [facility] ID Service: Progress Note     Patient:  Parker Acevedo, Date of birth 1970, Medical record number [id number]  Date of Visit:  February 2, 2021  Consult Requested by: Sammi Trejo MD         Assessment and Recommendations:   Problem List:  1. 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 Port cultures from 1/28 growing: GPB resembling diphteroids, Corynebacterium, Enterococcus faecalis, Staphylococcus hominis.  B. Port cultures from 1/29 growing: Streptococcus salivarius, Streptococcus mitis, Staphylococcus hominis, Staphylococcus capitis, Staphylococcus epidermidis, Enterococcus faecalis, Aerococcus viridans, Corynebacterium jeikeium  2. Prior Celestino-en-Y gastric bypass, esophagojejunostomy  A. Complicated by short gut syndrome and chronic malnutrition requiring TPN.  3. GERD  4. HTN    Recommendations:  1. Continue vancomycin  2. Recommend removing the line and having a 48-72 hour \"line holiday\" with only PIVs as needed.   3. Continue to collect daily peripheral blood cultures.  4. If peripheral blood cultures remain clear for 72 hours, could replace line at that time  5. When replaced, it would be ideal to have a line placed that would allow ethanol/antibiotic lock therapy:  A. Specifically, line should be silicone material (doesn't degrade with ethanol), not polyurethane.   B. Recent patient had this placed and lot# for the catheter was [id number]. IR may still have catheters from this lot remaining, so we will communicate this to them.    Discussion:  50yo M with complicated post Celestino-en-Y course requiring TPN for chronic malnutrition presents with likely line infection, subsequent to several prior line infections requiring replacement of line. Though some organisms growing on 1/28 and 1/29 cultures do appear likely contaminants, there are several that are more concerning, such as Staphylococcus epidermidis, Enterococcus, and Corynebacterium.     Very much " appreciate primary team having line removed promptly! We will continue watching daily cultures for any further growth. If they remain clear, should be okay to replace line on Friday. The ideal catheter for ethanol/antibiotic lock therapy is silicone-based. We recently had a patient receive one of these at [facility] with the catheter coming from lot# [id number]. We will contact IR to see if any from this lot remain and ask that it be used for Mr. Acevedo's repalcement.    Koko Sebastian MD  Division of Infectious Diseases and International Medicine  P: [phone]        History of Present Illness:     Mr. Acevedo is a 51 yo M with a history of Celestino-en-Y gastric bypass (has lost nearly 300 pounds as a result) and esophagojejunostomy complicated by short gut syndrome and chronic malnutriion requiring TPN, as well as several incidents of polymicrobial central line infection resulting in line replacement. He presented on 1/30 for another suspected such episode.     Per patient and concurrent notes from ID colleague Dr. Buckner, Mr. Acevedo began having intermittent fevers about one week prior to 1/29. He states that, in the past, he develops fever, loose stool, and fatigue, which is similar to what he developed this time. This prompted him to contact Dr. Buckner, who then ordered blood cultures, which were collected from his port (two sets collected on both 1/28 and 1/29). When the 1/29 cultures began growing GPCs, patient was advised to come to the ED for evaluation and admission. Since that time, his cultures have grown a litany of organisms (not uncommon in his episodes), including: Streptococcus salivarius, Streptococcus mitis, Staphylococcus hominis, Stahylococcus capitis, Staphylococcus epidermidis, Enterococcus faecalis, Aerococcus viridans, Corynebacterium jeikeium (all on 1/29 sets) and GPB resembling diphteroids, Corynebacterium, Enterococcus faecalis, Staphylococcus hominis. (all on 1/28 sets).     Initially,  he was started on ceftriaxone x 1 dose, and then transitioned to Linezolid x 1 dose, and since 1/31 has been on vancomycin. Blood cultures collected on 1/31 (one peripheral and one port, though different port than 1/29 sets) have remained clear. Patient has been afebrile with normal white count throughout this admission.    Prior line infections in the past 2 years have grown: Staphylococcus lugdunensis, Psychrobacter faecalis, Corynebacterium, Staphylococcus epidermidis, Finegoldia magna, Granulicatella adiacens, Streptococcus salivarius, Rothia mucilaginosa, Candida albicans         Review of Systems:   Full 10 point ROS obtained, pertinent positives and negatives as above.       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     Complication of anesthesia      Difficulty swallowing      Gastric ulcer, unspecified as acute or chronic, without mention of hemorrhage, perforation, or obstruction      Gastro-oesophageal reflux disease      Head injury      Hiatal hernia      Other bladder disorder      Other chronic pain      PONV (postoperative nausea and vomiting)      Severe malnutrition (H)     TPN     Short gut syndrome      Tobacco abuse      Past Surgical History:   Procedure Laterality Date     AMPUTATION       APPENDECTOMY       BACK SURGERY  11/3/2014    curve in the spine     BIOPSY LYMPH NODE CERVICAL N/A 2/20/2015    Procedure: BIOPSY LYMPH NODE CERVICAL;  Surgeon: Baron Scanlon MD;  Location: [facility]     C GASTRIC BYPASS,OBESE<100CM SHAYLEE-EN-Y  2002    lost 300 pounds     CHOLECYSTECTOMY       DISCECTOMY, FUSION CERVICAL ANTERIOR ONE LEVEL, COMBINED N/A 2/15/2017    Procedure: COMBINED DISCECTOMY, FUSION CERVICAL  ANTERIOR ONE LEVEL;  Surgeon: Darren Campos MD;  [facility]     ENDOSCOPIC INSERTION TUBE GASTROSTOMY  9/9/2013    Procedure: ENDOSCOPIC INSERTION TUBE GASTROSTOMY;;  Surgeon: Francis Vyas MD;  [facility]     ENDOSCOPIC ULTRASOUND UPPER GASTROINTESTINAL TRACT (GI)  4/29/2011    Procedure:ENDOSCOPIC ULTRASOUND UPPER GASTROINTESTINAL TRACT (GI); Both Procedures done Conjointly; Surgeon:NEREIDA HOUSER; [facility]     ENDOSCOPIC ULTRASOUND UPPER GASTROINTESTINAL TRACT (GI)  9/9/2013    Procedure: ENDOSCOPIC ULTRASOUND UPPER GASTROINTESTINAL TRACT (GI);  Endoscopic Ultrasound Guide Gastrostomy Tube Placement  C-arm;  Surgeon: Noe Lizarraga MD;  [facility]     ENDOSCOPY  03/25/11    EGD, [facility]     ENDOSCOPY  08/04/09    Upper Endoscopy, [facility]     ENDOSCOPY  01/05/09    Upper Endoscopy, [facility]     ESOPHAGOSCOPY, GASTROSCOPY, DUODENOSCOPY (EGD), COMBINED  4/20/2011    Procedure:COMBINED ESOPHAGOSCOPY, GASTROSCOPY, DUODENOSCOPY (EGD); Surgeon:FRANCIS VYAS; [facility]     ESOPHAGOSCOPY, GASTROSCOPY, DUODENOSCOPY (EGD), COMBINED  6/15/2011    Procedure:COMBINED ESOPHAGOSCOPY, GASTROSCOPY, DUODENOSCOPY (EGD); Surgeon:FRANCIS YVAS; [facility]     ESOPHAGOSCOPY, GASTROSCOPY, DUODENOSCOPY (EGD), COMBINED  6/12/2013    Procedure: COMBINED ESOPHAGOSCOPY, GASTROSCOPY, DUODENOSCOPY (EGD);;  Surgeon: Francis Vyas MD;  [facility]     ESOPHAGOSCOPY, GASTROSCOPY, DUODENOSCOPY (EGD), COMBINED  11/22/2013    Procedure: COMBINED ESOPHAGOSCOPY, GASTROSCOPY, DUODENOSCOPY (EGD);;  Surgeon: Francis Vyas MD;  [facility]     ESOPHAGOSCOPY, GASTROSCOPY, DUODENOSCOPY (EGD), COMBINED  4/30/2014    Procedure: COMBINED ESOPHAGOSCOPY, GASTROSCOPY, DUODENOSCOPY (EGD);  Surgeon: Francis Vyas MD;  [facility]     ESOPHAGOSCOPY, GASTROSCOPY, DUODENOSCOPY (EGD), COMBINED N/A 2/20/2015    Procedure: COMBINED  ESOPHAGOSCOPY, GASTROSCOPY, DUODENOSCOPY (EGD), BIOPSY SINGLE OR MULTIPLE;  Surgeon: Baron Scanlon MD;  [facility]     ESOPHAGOSCOPY, GASTROSCOPY, DUODENOSCOPY (EGD), COMBINED N/A 9/30/2015    Procedure: COMBINED ESOPHAGOSCOPY, GASTROSCOPY, DUODENOSCOPY (EGD);  Surgeon: Francis Vyas MD;  Location:  GI     ESOPHAGOSCOPY, GASTROSCOPY, DUODENOSCOPY (EGD), COMBINED N/A 10/3/2019    Procedure: Upper Endoscopy;  Surgeon: Clif Morrow MD;  [facility]     GASTRECTOMY  6/22/2012    Procedure: GASTRECTOMY;  Open Approach, Excise Ulcers,Partial Gastrectomy, Esophagojejunostomy, Hiatal Hernia Repair, Extensive Lysis of Adhesions and Esaphagogastrodudenoscopy.;  Surgeon: Francis Vyas MD;  [facility]     GASTROJEJUNOSTOMY  08/26/09    Extensice enterolysis, partial resect. jejunum, part. resect gastric pouch, gastrojejunostomy anastomosis     HC ESOPH/GAS REFLUX TEST W NASAL IMPED ELECTRODE  8/5/2013    Procedure: ESOPHAGEAL IMPEDENCE FUNCTION TEST 1 HOUR OR LESS;  Surgeon: Halie Lang MD;  Location:  GI     HEAD & NECK SURGERY  2/15/2017    C5-C6     HERNIA REPAIR  2006    Umbilical hernia     HERNIORRHAPHY HIATAL  6/22/2012    Procedure: HERNIORRHAPHY HIATAL;;  Surgeon: Francis Vyas MD;  [facility]     HERNIORRHAPHY INGUINAL  11/22/2013    Procedure: HERNIORRHAPHY INGUINAL;;  Surgeon: Francis Vyas MD;  [facility]     INSERT PICC LINE Right 12/19/2019    Procedure: Picc Placement;  Surgeon: Per Dumont PA-C;  [facility]     INSERT PICC LINE Right 2/21/2020    Procedure: INSERTION, PICC;  Surgeon: Per Dumont PA-C;  [facility]     INSERT PORT VASCULAR ACCESS Right 12/19/2017    Procedure: INSERT PORT VASCULAR ACCESS;  Right Chest Port Placement ;  Surgeon: Lisandro Alejandro PA-C;  [facility]     INSERT PORT VASCULAR ACCESS Right 8/2/2018    Procedure: INSERT PORT VASCULAR ACCESS;  Place single lumen tunneled  central venous access catheter;  Surgeon: Guy Jamil PA-C;  [facility]     IR CVC TUNNEL PLACEMENT > 5 YRS OF AGE  8/7/2019     IR CVC TUNNEL PLACEMENT > 5 YRS OF AGE  4/14/2020     IR CVC TUNNEL PLACEMENT > 5 YRS OF AGE  8/3/2020     IR CVC TUNNEL PLACEMENT > 5 YRS OF AGE  9/4/2020     IR CVC TUNNEL REMOVAL RIGHT  10/1/2019     IR CVC TUNNEL REMOVAL RIGHT  7/30/2020     IR CVC TUNNEL REMOVAL RIGHT  9/2/2020     IR FOLLOW UP VISIT OUTPATIENT  8/7/2019     IR PICC PLACEMENT > 5 YRS OF AGE  3/7/2019     IR PICC PLACEMENT > 5 YRS OF AGE  12/19/2019     IR PICC PLACEMENT > 5 YRS OF AGE  2/21/2020     LAPAROTOMY EXPLORATORY  11/22/2013    Procedure: LAPAROTOMY EXPLORATORY;  Exploratory Laparotomy, Upper Endoscopy, Left Inguinal Hernia Repair;  Surgeon: Francis Vyas MD;  [facility]     ORTHOPEDIC SURGERY       PICC INSERTION Right 03/16/2017    5fr DL BioFlo PICC, 42cm (3cm external) in the R medial brachial vein w/ tip in the SVC RA junction.     PICC INSERTION Left 09/23/2017    5fr DL BioFlo PICC, 45cm (1cm external) in the L basilic vein w/ tip in the SVC RA junction.     PICC INSERTION Right 05/16/2019    5Fr - 43cm, Medial brachial vein, low SVC     PICC INSERTION Right 10/02/2019    5Fr - 43cm (2cm external), basilic vein, low SVC     SHAYLEE EN Y BOWEL  2003     SOFT TISSUE SURGERY       THORACIC SURGERY       TONSILLECTOMY       TRANSESOPHAGEAL ECHOCARDIOGRAM INTRAOPERATIVE N/A 1/8/2019    Procedure: TRANSESOPHAGEAL ECHOCARDIOGRAM INTRAOPERATIVE;  Surgeon: GENERIC ANESTHESIA PROVIDER;  [facility]         Allergies:      Allergies   Allergen Reactions     Bactrim [Sulfamethoxazole W/Trimethoprim] Rash     Penicillins Anaphylaxis     Please see Antimicrobial Management Team allergy assessment note 10/10/2018. Patient reported tolerating amoxicillin.     Doxycycline Rash     Vancomycin Rash     Rash after receiving vancomycin 3/28/16 (red man's?). Tolerated with slower infusion  and diphenhydramine premed.            Current Antimicrobials:     Vancomycin       Family History:     Family History   Problem Relation Age of Onset     Gastrointestinal Disease Mother         Crohns disease     Anxiety Disorder Mother      Thyroid Disease Mother         Grave's disease     Cancer Father         ear cancer-skin cancer/melanoma     Breast Cancer Maternal Grandmother      Macular Degeneration Maternal Grandfather      Anxiety Disorder Sister      Diabetes Maternal Uncle      Breast Cancer Other      Hypertension No family hx of      Hyperlipidemia No family hx of      Cerebrovascular Disease No family hx of      Prostate Cancer No family hx of      Depression No family hx of      Anesthesia Reaction No family hx of      Asthma No family hx of      Osteoporosis No family hx of      Genetic Disorder No family hx of      Obesity No family hx of      Mental Illness No family hx of      Substance Abuse No family hx of      Glaucoma No family hx of         Social History:     Social History     Socioeconomic History     Marital status:      Spouse name: Rose     Number of children: 1     Years of education: Not on file     Highest education level: GED or equivalent   Occupational History     Not on file   Social Needs     Financial resource strain: Not hard at all     Food insecurity     Worry: Never true     Inability: Never true     Transportation needs     Medical: No     Non-medical: No   Tobacco Use     Smoking status: Current Some Day Smoker     Packs/day: 1.00     Years: 6.00     Pack years: 6.00     Types: Cigarettes     Smokeless tobacco: Never Used   Substance and Sexual Activity     Alcohol use: No     Frequency: Never     Drinks per session: Patient refused     Binge frequency: Never     Comment: quit 2002     Drug use: No     Sexual activity: Yes     Partners: Female     Birth control/protection: None     Comment: no protection   Lifestyle     Physical activity     Days per week: 2  days     Minutes per session: 10 min     Stress: Only a little   Relationships     Social connections     Talks on phone: Once a week     Gets together: Never     Attends Synagogue service: Never     Active member of club or organization: No     Attends meetings of clubs or organizations: Never     Relationship status:      Intimate partner violence     Fear of current or ex partner: No     Emotionally abused: No     Physically abused: No     Forced sexual activity: No   Other Topics Concern     Parent/sibling w/ CABG, MI or angioplasty before 65F 55M? No   Social History Narrative     Not on file            Physical Exam:   Ranges forvital signs:  Temp:  [96.9  F (36.1  C)-98.1  F (36.7  C)] 98.1  F (36.7  C)  Pulse:  [55-81] 67  Resp:  [14-20] 16  BP: (101-134)/(68-83) 120/80  SpO2:  [97 %-100 %] 100 %    Intake/Output Summary (Last 24 hours) at 2/2/2021 1906  Last data filed at 2/2/2021 1400  Gross per 24 hour   Intake 1520 ml   Output --   Net 1520 ml       Exam:  GENERAL:  well-developed, well-nourished, sitting in bed in no acute distress.   ENT:  Head is normocephalic, atraumatic. Oropharynx is moist without exudates or ulcers.  EYES:  Eyes have anicteric sclerae.    NECK:  Supple.  LUNGS:  Clear to auscultation.  CARDIOVASCULAR:  Regular rate and rhythm with no murmurs, gallops or rubs.  ABDOMEN:  Normal bowel sounds, soft, nontender except around former PEG site. Bandaged site of former PEG present without noticeable drainage, erythema.   EXT: Extremities warm and without edema.  SKIN:  No acute rashes.  Line is in place without any surrounding erythema.   NEUROLOGIC:  Grossly nonfocal.         Laboratory Data:     Inflammatory Markers    Recent Labs   Lab Test 01/29/21  2312 12/14/20  1415 11/17/20  1445 07/28/20  1607 05/05/20  1218 04/28/20  1245 11/02/19  1853 10/30/19  1330 05/14/19  1145 05/14/19  1145 01/23/18  0845 01/23/18  0845 01/20/18  1030 09/24/17  1900 09/24/17  1900 06/28/17  2222  06/28/17  2222 03/12/17  0351 11/01/16  1530 11/01/16  1530   SED  --   --   --  10  --   --   --   --   --  13  --  10 11  --  31*  --  26* 17*  --  27*   CRP <2.9 <2.9 <2.9 <2.9 <2.9 <2.9 <2.9 <2.9   < >  --    < > <2.9 5.4   < > 26.6*   < > 53.0* 3.4   < > 8.4*    < > = values in this interval not displayed.       Hematology Studies    Recent Labs   Lab Test 02/03/21  0552 01/31/21  0639 01/30/21  0619 01/29/21  2312 01/28/21  1730 01/26/21  1230 01/12/21  1300 12/29/20  1015 12/14/20  1415 11/17/20  1445 11/17/20  1445 09/01/20  0550 09/01/20  0550 08/31/20  1822 07/28/20  1607 07/28/20  1607   WBC 5.8 8.8 8.9 9.4 7.7 5.9 6.5 6.7 6.7   < > 4.5   < > 7.6 9.2   < > 6.8   ANEU  --   --   --  5.7 4.4 3.6 3.0 4.2 3.2   < > 2.2   < > 4.0 6.1   < > 4.0   AEOS  --   --   --  0.1 0.1  --   --   --   --   --  0.2  --  0.2 0.1  --  0.1   HGB 11.3* 11.4* 12.4* 12.1* 12.2* 11.5* 10.2* 11.1* 10.1*   < > 9.5*   < > 11.3* 11.9*   < > 12.9*   MCV 96 90 90 89 90 89 89 86 89   < > 92   < > 92 91   < > 92    226 243 239 244 211 181 211 165   < > 129*   < > 192 220   < > 189    < > = values in this interval not displayed.     Metabolic Studies     Recent Labs   Lab Test 02/03/21  0552 02/02/21  0605 02/01/21  0546 01/31/21  0639 01/29/21  2312 01/26/21  1230    140  --  138 140 144   POTASSIUM 3.8 3.6  --  3.7 3.8 3.4   CHLORIDE 109 108  --  105 108 111*   CO2 29 32  --  30 28 29   BUN 17 13  --  18 14 8   CR 0.82 0.87 0.73 0.82 0.67 0.52*   GFRESTIMATED >90 >90 >90 >90 >90 >90     Hepatic Studies    Recent Labs   Lab Test 02/03/21  0552 01/29/21  2312 01/26/21  1230 01/12/21  1300 12/29/20  1015 12/14/20  1415   BILITOTAL 0.4 1.0 0.3 0.7 0.3 0.5   ALKPHOS 63 69 66 80 71 70   ALBUMIN 3.2* 3.4 3.2* 3.8 3.2* 3.4   AST 12 19 22 21 12 16   ALT 27 40 30 25 17 16       Microbiology:  Culture Micro   Date Value Ref Range Status   02/03/2021 No growth after 12 hours  Preliminary   01/30/2021 No growth after 4 days  Preliminary    01/30/2021 No growth after 4 days  Preliminary   01/29/2021   Preliminary    (Note)  POSITIVE for Staphylococci other than S.aureus, S.epidermidis and  S.lugdunensis, by MAYKOR multiplex nucleic acid test.  Coagulase-negative staphylococci are the most common venipuncture or  collection associated skin CONTAMINANTS grown in blood cultures.  Final identification and antimicrobial susceptibility testing will be  verified by standard methods.    Specimen tested with Verigene multiplex, gram-positive blood culture  nucleic acid test for the following targets: Staph aureus, Staph  epidermidis, Staph lugdunensis, other Staph species, Enterococcus  faecalis, Enterococcus faecium, Streptococcus species, S. agalactiae,  S. anginosus grp., S. pneumoniae, S. pyogenes, Listeria sp., mecA  (methicillin resistance) and Melvin/B (vancomycin resistance).    Critical Value/Significant Value called to and read back by MARTHA PAULINO RN @ 1/30/21 2231      01/29/2021 (A)  Preliminary    Cultured on the 1st day of incubation:  Staphylococcus hominis  Susceptibility testing done on previous specimen     01/29/2021 (A)  Preliminary    Cultured on the 1st day of incubation:  Staphylococcus capitis  Susceptibility testing in progress     01/29/2021   Preliminary    Critical Value/Significant Value, preliminary result only, called to and read back by  Martha Paulino RN 01/30/21 @1939 [facility]     01/29/2021 (A)  Preliminary    Cultured on the 1st day of incubation:  Staphylococcus epidermidis     01/29/2021 (A)  Preliminary    Cultured on the 1st day of incubation:  Enterococcus faecalis  Susceptibility testing done on previous specimen     01/29/2021 (A)  Preliminary    Cultured on the 1st day of incubation:  Aerococcus viridans     01/29/2021 (A)  Preliminary    Cultured on the 1st day of incubation:  Corynebacterium jeikeium  Susceptibility testing in progress     01/29/2021   Preliminary    Critical Value/Significant Value, preliminary  result only, called to and read back by  Phillip Bowden RN [facility] at 1328 on 2.2.21 rd.     01/29/2021 (A)  Final    Cultured on the 1st day of incubation:  Streptococcus salivarius group     01/29/2021   Final    Critical Value/Significant Value, preliminary result only, called to and read back by  POLINA VAUGHN RN 01/30/21 1326 [facility].     01/29/2021 (A)  Final    Cultured on the 1st day of incubation:  Streptococcus mitis group     01/29/2021   Final    Critical Value/Significant Value, preliminary result only, called to and read back by  Phillip Bowden RN [facility] at 1328 on 2.2.21 rd.     01/28/2021 (A)  Preliminary    Cultured on the 2nd day of incubation:  Gram positive bacilli resembling diphtheroids  Susceptibility testing in progress     01/28/2021   Preliminary    Critical Value/Significant Value, preliminary result only, called to and read back by  Jeet Paulino RN 01/30/21 @2007 [facility]     01/28/2021 (A)  Preliminary    Cultured on the 2nd day of incubation:  Corynebacterium species  Identification obtained by MALDI-TOF mass spectrometry research use only database. Test   characteristics determined and verified by the [facility]   ([facility]) [place], [place].  Susceptibility testing in progress     01/28/2021   Preliminary    Critical Value/Significant Value, preliminary result only, called to and read back by  Phillip Bowden RN [facility] at 1328 on 2.2.21 rd.     01/28/2021 (A)  Preliminary    Cultured on the 1st day of incubation:  Enterococcus faecalis     01/28/2021   Preliminary    Critical Value/Significant Value, preliminary result only, called to and read back by   at 1325 1.29.21 KZ     01/28/2021 (A)  Preliminary    Cultured on the 1st day of incubation:  Staphylococcus hominis     01/28/2021   Preliminary    Critical Value/Significant Value, preliminary result only, called to and read back by  Dr. Buckner 1915 01.29.2021 NM     01/28/2021   Preliminary    (Note)  POSITIVE for ENTEROCOCCUS  FAECALIS and NEGATIVE for Melvin/vanB genes  by Verigene multiplex nucleic acid test. Final identification and  antimicrobial susceptibility testing will be verified by standard  methods.    Specimen tested with Verigene multiplex, gram-positive blood culture  nucleic acid test for the following targets: Staph aureus, Staph  epidermidis, Staph lugdunensis, other Staph species, Enterococcus  faecalis, Enterococcus faecium, Streptococcus species, S. agalactiae,  S. anginosus grp., S. pneumoniae, S. pyogenes, Listeria sp., mecA  (methicillin resistance) and Melvin/B (vancomycin resistance).    Critical Value/Significant Value called to and read back by Dr. Buckner, 1.29.21 @ 1555 pt.    POSITIVE for Staphylococci other than S.aureus, S.epidermidis and  S.lugdunensis, by Verigene multiplex nucleic acid test.  Coagulase-negative staphylococci are the most common venipuncture or  collection associated skin CONTAMINANTS grown in blood cultures.  Final identification and antimicrobial susceptibility testing will be  verified by standard methods.    Specimen tested with Verigene multiplex, gram-positive blood culture  nucleic acid test for the following targets: Staph aureus, Staph  epidermidis, Staph lugdunensis, other Staph species, Enterococcus  faecalis, Enterococcus faecium, Streptococcus species, S. agalactiae,  S. anginosus grp., S. pneumoniae, S. pyogenes, Listeria sp., mecA  (methicillin resistance) and Melvin/B (vancomycin resistance).    Critical Value/Significant Value called to and read back by Dr. Buckner  1917 01.29.2021 NM     11/17/2020 No growth  Final   11/17/2020 No growth  Final   11/17/2020 No growth  Final   10/29/2020 No growth  Final   10/29/2020 No growth  Final   09/03/2020 No growth  Final   09/03/2020 No growth  Final   09/02/2020 No growth  Final   09/02/2020 No growth  Final   09/02/2020 No growth  Final   09/02/2020 No growth  Final   09/02/2020 No growth  Final   09/01/2020 No growth  Final    09/01/2020 No growth  Final   08/31/2020 No growth  Final   08/31/2020 (A)  Final    Cultured on the 1st day of incubation:  Staphylococcus lugdunensis     08/31/2020   Final    Critical Value/Significant Value, preliminary result only, called to and read back by  Penny Alvarado RN @2200 09/01/2020 [facility]     08/29/2020 (A)  Final    Cultured on the 1st day of incubation:  Gram positive rods  Unable to further identify.     08/29/2020   Final    Critical Value/Significant Value, preliminary result only, called to and read back by  Dr. Sara Warren 2038 8/30/20 AM     08/29/2020 (A)  Final    Cultured on the 1st day of incubation:  Psychrobacter faecalis  Previously reported as:  Gram positive cocci  CORRECTED ON:  9.2.2020 at 1432. KVO  CORRECTED ON 09/11 AT 1505: PREVIOUSLY REPORTED AS Cultured on the 1st day of incubation:   Psychrobacter species Further speciated as: Psychrobacter faecalis Previously reported as:   Gram positive cocci CORRECTED ON: 9.2.2020 at 1432. KVO     08/29/2020   Final    Critical Value/Significant Value, preliminary result only, called to and read back by  Sandie Burroughs RN at 1412 9.1.20.DK     08/29/2020   Final    Corrected report called to and read back by  Sandie Burroughs RN on 9.2.2020 at 1432. KVO     08/29/2020   Final    (Note)  NEGATIVE for the following: Staphylococcus spp., Staph aureus, Staph  epidermidis, Staph lugdunensis, Streptococcus spp., Strep pneumoniae,  Strep pyogenes, Strep agalactiae, Strep anginosus group, Enterococcus  faecalis, Enterococcus faecium, and Listeria spp. by [employer]  multiplex nucleic acid test. Final identification and antimicrobial  susceptibility testing will be verified by standard methods.    Specimen tested with [employer]ene multiplex, gram-positive blood culture  nucleic acid test for the following targets: Staph aureus, Staph  epidermidis, Staph lugdunensis, other Staph species, Enterococcus  faecalis, Enterococcus faecium, Streptococcus species, S.  agalactiae,  S. anginosus grp., S. pneumoniae, S. pyogenes, Listeria sp., mecA  (methicillin resistance) and Melvin/B (vancomycin resistance).    Critical Value/Significant Value called to and read back by  Dr. Sara Warren 2038 8/30/20 AM       08/28/2020 No growth  Final   08/28/2020 No growth  Final   07/30/2020 (A)  Final    <15 colonies   Staphylococcus epidermidis  Susceptibility testing not routinely done     07/29/2020 No growth  Final   07/29/2020 No growth  Final   07/28/2020 No growth  Final   07/28/2020 (A)  Final    Cultured on the 2nd day of incubation:  Gram positive cocci in clusters  Upon further review, there is not any gram positive cocci in clusters present in this   blood culture.     07/28/2020 (A)  Final    Cultured on the 2nd day of incubation:  Corynebacterium species  Identification obtained by MALDI-TOF mass spectrometry research use only database. Test   characteristics determined and verified by the [facility]   ([facility]) [place], [place].     07/28/2020   Final    Critical Value/Significant Value, preliminary result only, called to and read back by  Richie Nugent RN 2223 7/30/20 AM     07/28/2020   Final    Updated report called to and read back by  Spring Lugo RN at 1300 on 8.2.20 ME     07/28/2020   Final    (Note)  NEGATIVE for the following: Staphylococcus spp., Staph aureus, Staph  epidermidis, Staph lugdunensis, Streptococcus spp., Strep pneumoniae,  Strep pyogenes, Strep agalactiae, Strep anginosus group, Enterococcus  faecalis, Enterococcus faecium, and Listeria spp. by [employer]  multiplex nucleic acid test. Final identification and antimicrobial  susceptibility testing will be verified by standard methods.    Critical Value/Significant Value called to and read back by LIZET BAUGH RN U5B 0119 07.31.20 CF     07/26/2020 (A)  Final    Cultured on the 1st day of incubation:  Staphylococcus epidermidis  Susceptibility testing done on previous specimen      07/26/2020   Final    Critical Value/Significant Value, preliminary result only, called to and read back by  Neha Quintana Pharmacist [facility] 7.27.20 1735. BRANDON     07/26/2020 (A)  Final    Cultured on the 1st day of incubation:  Strain 2  Staphylococcus epidermidis  Susceptibility testing done on previous specimen     07/26/2020 (A)  Final    Cultured on the 1st day of incubation:  Staphylococcus epidermidis     07/26/2020   Final    Critical Value/Significant Value, preliminary result only, called to and read back by  Yaa Jarquin RN [facility] 7.27.20 1616. BRANDON     07/26/2020 (A)  Final    Cultured on the 1st day of incubation:  Strain 2  Staphylococcus epidermidis     07/26/2020   Final    (Note)  POSITIVE for STAPHYLOCOCCUS EPIDERMIDIS and POSITIVE for the mecA  gene (resistant to methicillin) by [employer] nucleic acid  test. Final identification and antimicrobial susceptibility testing  will be verified by standard methods.    Specimen tested with [employer] multiplex, gram-positive blood culture  nucleic acid test for the following targets: Staph aureus, Staph  epidermidis, Staph lugdunensis, other Staph species, Enterococcus  faecalis, Enterococcus faecium, Streptococcus species, S. agalactiae,  S. anginosus grp., S. pneumoniae, S. pyogenes, Listeria sp., mecA  (methicillin resistance) and Melvin/B (vancomycin resistance).    Critical Value/Significant Value called to and read back by Chantale Jarrett RN. @[facility]. 7.27.20. BS.        04/03/2020 No growth  Final   04/03/2020 No growth  Final   11/02/2019 No growth  Final   11/02/2019 No growth  Final   10/30/2019 No growth  Final   10/30/2019 (A)  Final    Cultured on the 3rd day of incubation:  Corynebacterium species  Identification obtained by MALDI-TOF mass spectrometry research use only database. Test   characteristics determined and verified by the [facility]   ([facility]) [place], [place].     10/30/2019   Final    Critical  Value/Significant Value, preliminary result only, called to and read back by   DR VYAS @ 1745. 11/1/2019 AV     10/30/2019 (A)  Final    Cultured on the 4th day of incubation:  Finegoldia magna (Peptostreptococcus otto)     10/30/2019   Final    Critical Value/Significant Value, preliminary result only, called to and read back by  DANI SANCHES RN 0200 11.3.19 ND     10/30/2019   Final    (Note)  NEGATIVE for the following: Staphylococcus spp., Staph aureus, Staph  epidermidis, Staph lugdunensis, Streptococcus spp., Strep pneumoniae,  Strep pyogenes, Strep agalactiae, Strep anginosus group, Enterococcus  faecalis, Enterococcus faecium, and Listeria spp. by [employer]  multiplex nucleic acid test. Final identification and antimicrobial  susceptibility testing will be verified by standard methods.    Critical Value/Significant Value called to and read back by  Francis Vyas MD @ 1745. 11/1/19. AV.           NEGATIVE for the following: Staphylococcus spp., Staph aureus, Staph  epidermidis, Staph lugdunensis, Streptococcus spp., Strep pneumoniae,  Strep pyogenes, Strep agalactiae, Strep anginosus group, Enterococcus  faecalis, Enterococcus faecium, and Listeria spp. by [employer]  multiplex nucleic acid test. Final identification and antimicrobial  susceptibility testing will be verified by standard methods.    Critical Value/Significant Value called to and read back by DANI SANCHES RN 0503 11.3.19 ND     10/29/2019 No growth  Final   10/17/2019 No growth  Final   10/17/2019 No growth  Final   10/01/2019 No growth  Final   10/01/2019 No growth  Final   09/30/2019 No growth  Final   09/29/2019 No growth  Final   09/29/2019 No growth  Final   09/27/2019 (A)  Final    Cultured on the 1st day of incubation:  Granulicatella adiacens     09/27/2019   Final    Critical Value/Significant Value, preliminary result only, called to and read back by  Dr Vyas on 9.28.19 at 1632 bw     09/27/2019 (A)  Final    Cultured on the 1st  day of incubation:  Staphylococcus epidermidis     09/27/2019   Final    Critical Value/Significant Value, preliminary result only, called to and read back by  paged to [facility] on 9.28.19 at 2327      09/27/2019 (A)  Final    Cultured on the 1st day of incubation:  Streptococcus salivarius group     09/27/2019   Final    (Note)  POSITIVE for STREPTOCOCCUS SPECIES OTHER THAN pneumococcus, anginosus  group, S. pyogenes and S. agalactiae. Performed using [employer]  multiplex nucleic acid test. Final identification and antimicrobial  susceptibility testing will be verified by standard methods.    POSITIVE for STAPHYLOCOCCUS EPIDERMIDIS and POSITIVE for the mecA  gene (resistant to methicillin) by [employer] multiplex nucleic acid  test. Final identification and antimicrobial susceptibility testing  will be verified by standard methods.    Specimen tested with Verigene multiplex, gram-positive blood culture  nucleic acid test for the following targets: Staph aureus, Staph  epidermidis, Staph lugdunensis, other Staph species, Enterococcus  faecalis, Enterococcus faecium, Streptococcus species, S. agalactiae,  S. anginosus grp., S. pneumoniae, S. pyogenes, Listeria sp., mecA  (methicillin resistance) and Melvin/B (vancomycin resistance).    Critical Value/Significant Value called to and read back by Estelle Hampton RN 9.27.19 @ 0227 AV     09/27/2019 No growth  Final   07/10/2019 No growth  Final   07/10/2019 No growth  Final   05/24/2019 No growth  Final   05/24/2019 No growth  Final   05/14/2019 No growth  Final   05/14/2019 No growth  Final   05/14/2019 No growth  Final   05/12/2019 No growth  Final   05/12/2019 No growth  Final     Vancomycin Levels    Recent Labs   Lab Test 02/02/21  1306 01/31/21  1249 09/02/20  1049   VANCOMYCIN 15.9 13.0 24.3          Imaging:     Reviewed. No recent imaging.

## 2021-02-04 NOTE — PROGRESS NOTES
[facility]    Medicine Progress Note - Hospitalist Service, Gold 8       Date of Admission:  1/29/2021  Assessment & Plan       Parker Acevedo is a 50 year old male admitted on 1/29/2021. He is a 51 yo male with PMH significant for Celestino-en-Y gastric bypass, esophagojejunostomy c/b short gut syndrome and chronic malnutrition, and history of recurrent line-related infections, here after 1.5 days of fevers at home, found to have blood cultures positive for polymicrobial deepa     #E. Faecalis, staph hominis, staph capitis, staph epidermidis, corynebacterium, and aerococcus bacteremia  #Hx of recurrent polymicrobial bacteremia  Has multiple past infections for bactermia/CLABSI. Admitted 7/28/20 with MRSE from Cm CVC (replaced 8/3/2020). Admitted again 8/31/2020 with blood cultures positive for staph lugdunensis. Cm removed and replaced at that August '20 admission. Follows with ID outpatient (Dr. Buckner). He had ~2 days of fever prior to this admission, so ID arranged blood cultures outpatient. Positive for polymicrobial deepa.  - Cm removed  - plan for ~72 hour line holiday; plan for line placement tomorrow if ongoing negative blood cultures overnight  - on vancomycin; abx per ID  - Follow blood cultures     # Hx Celestino-en-Y gastric bypass, esophagojejunostomy c/b short gut syndrome and chronic malnutrition  # Chronic Abdominal Pain   Has been without G-tube for ~3 months, scheduled to be replaced.  - Continue PTA tylenol, fentanyl, oxycodone  - Holding home TPN  - Continue PTA PRN acetaminophen, fentanyl patch, and oxycodone  - Continue PTA GI cocktail PRN  - Continue PTA PRN sucralfate  - consider replacing G-tube for feeding, venting     #HTN: continue PTA coreg  # Asthma: continue albuterol inhaler  # ADHD: continue adderall   # Anxiety: continue lorazepam prn (for TPN and meds)  # GERD: continue pta PPI   # Normocytic anemia: 12.1 here, at baseline.  Monitor  # paranoia and inappropriate behavior. Prn quetiapine. Psych consult    #Malnutrition:    - Level of malnutrition: Non-Severe   - Based on: weight loss, mild (or greater) subcutaneous fat loss, mild (or greater) muscle loss        Diet: Regular Diet Adult    DVT Prophylaxis: Low Risk/Ambulatory with no VTE prophylaxis indicated  Sanchez Catheter: not present  Code Status: Full Code         Disposition Plan   Expected discharge: 2 - 3 days, recommended to prior living arrangement once antibiotic plan established.  Entered: Sammi Trejo MD 02/04/2021, 1:57 PM       The patient's care was discussed with the Patient.    Sammi Trejo MD  Hospitalist Service, [address]  Contact information available via [facility] Paging/Directory  Please see sign in/sign out for up to date coverage information  ______________________________________________________________________    Interval History   Nursing notes reviewed. NAEON. Feeling fine today. No particular complaints    Data reviewed today: I reviewed all medications, new labs and imaging results over the last 24 hours. I personally reviewed no images or EKG's today.    Physical Exam   Vital Signs: Temp: 97.8  F (36.6  C) Temp src: Oral BP: 116/66 Pulse: 69   Resp: 16 SpO2: 97 % O2 Device: None (Room air)    Weight: 188 lbs 9.6 oz  General Appearance:  Middle aged man in NAD sitting in hospital bed  Respiratory: ctab on ra, nonlabored  Cardiovascular: rrr, s1, s2, no murmurs  GI: soft, nt/nd  Skin: warm and dry, no rash on exposed skin  Neuro: alert, interactive, speech fluent, grossly nonfocal     Data   Recent Labs   Lab 02/04/21  0540 02/03/21  0552 02/02/21  0605 01/31/21  0639 01/31/21  0639 01/29/21  2312 01/29/21  2312   WBC 5.6 5.8  --   --  8.8   < > 9.4   HGB 10.3* 11.3*  --   --  11.4*   < > 12.1*   MCV 93 96  --   --  90   < > 89    168  --   --  226   < > 239   INR  --   --   --   --   --    --  1.04    141 140  --  138  --  140   POTASSIUM 3.7 3.8 3.6  --  3.7  --  3.8   CHLORIDE 109 109 108  --  105  --  108   CO2 28 29 32  --  30  --  28   BUN 16 17 13  --  18  --  14   CR 0.81 0.82 0.87   < > 0.82  --  0.67   ANIONGAP 5 4 <1*  --  4  --  5   SILAS 8.4* 8.5 8.3*  --  8.9  --  8.8   GLC 86 84 86  --  91  --  96   ALBUMIN  --  3.2*  --   --   --   --  3.4   PROTTOTAL  --  6.2*  --   --   --   --  6.7*   BILITOTAL  --  0.4  --   --   --   --  1.0   ALKPHOS  --  63  --   --   --   --  69   ALT  --  27  --   --   --   --  40   AST  --  12  --   --   --   --  19    < > = values in this interval not displayed.     No results found for this or any previous visit (from the past 24 hour(s)).  Medications       amphetamine-dextroamphetamine  20 mg Oral Daily     carvedilol  6.25 mg Oral BID w/meals     diphenhydrAMINE  25 mg Oral Q12H     fentaNYL  25 mcg Transdermal Q72H     fentaNYL   Transdermal Q8H     heparin lock flush  5-10 mL Intracatheter Q24H     nicotine  1 patch Transdermal Daily     nicotine   Transdermal Q8H     vancomycin (VANCOCIN) IV  1,750 mg Intravenous Q12H

## 2021-02-04 NOTE — PHARMACY-VANCOMYCIN DOSING SERVICE
Pharmacy Vancomycin Note  Date of Service 2021  Patient's  1970   50 year old, male    Indication: Bacteremia  Goal Trough Level: 15-20 mg/L  Day of Therapy: 6  Current Vancomycin regimen:  1750 mg IV q12h    Current estimated CrCl = Estimated Creatinine Clearance: 134.9 mL/min (based on SCr of 0.81 mg/dL).    Creatinine for last 3 days  2021:  6:05 AM Creatinine 0.87 mg/dL  2/3/2021:  5:52 AM Creatinine 0.82 mg/dL  2021:  5:40 AM Creatinine 0.81 mg/dL    Recent Vancomycin Levels (past 3 days)  2021:  1:06 PM Vancomycin Level 15.9 mg/L  2021:  2:15 PM Vancomycin Level 19.2 mg/L    Vancomycin IV Administrations (past 72 hours)                   vancomycin (VANCOCIN) 1,750 mg in sodium chloride 0.9 % 500 mL intermittent infusion (mg) 1,750 mg New Bag 21 0310     1,750 mg New Bag 21 1432     1,750 mg New Bag  0342     1,750 mg New Bag 21 1435     1,750 mg New Bag  0151     1,750 mg New Bag 21 1519                Nephrotoxins and other renal medications (From now, onward)    Start     Dose/Rate Route Frequency Ordered Stop    21 1400  vancomycin (VANCOCIN) 1,750 mg in sodium chloride 0.9 % 500 mL intermittent infusion      1,750 mg  over 2 Hours Intravenous EVERY 12 HOURS 21 1355               Contrast Orders - past 72 hours (72h ago, onward)    None          Interpretation of levels and current regimen:  Trough level is  Therapeutic    Has serum creatinine changed > 50% in last 72 hours: No    Urine output:  unable to determine    Renal Function: Stable    Plan:  1.  Continue Current Dose of 1750 mg q12H  2.  Pharmacy will check trough levels as appropriate in 1-3 Days.    3. Serum creatinine levels will be ordered daily for the first week of therapy and at least twice weekly for subsequent weeks.      Lucia Espinoza, PharmD Student         .

## 2021-02-04 NOTE — PROGRESS NOTES
[facility] General ID Service: Progress Note     Patient:  Parker Acevedo, Date of birth 1970, Medical record number [id number]  Date of Visit:  February 2, 2021  Consult Requested by: Sammi Trejo MD         Assessment and Recommendations:   Problem List:  1. 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 Port cultures from 1/28 growing: GPB resembling diphteroids, Corynebacterium, Enterococcus faecalis, Staphylococcus hominis.  B. Port cultures from 1/29 growing: Streptococcus salivarius, Streptococcus mitis, Staphylococcus hominis, Staphylococcus capitis, Staphylococcus epidermidis, Enterococcus faecalis, Aerococcus viridans, Corynebacterium jeikeium  2. Prior Celestino-en-Y gastric bypass, esophagojejunostomy  A. Complicated by short gut syndrome and chronic malnutrition requiring TPN.  3. GERD  4. HTN    Recommendations:  1. Continue vancomycin   2. Continue to collect daily peripheral blood cultures.  3. If peripheral blood cultures remain clear for 72 hours, can replace line Friday.  4. Spoke with IR today and confirmed they are going to attempt to source and place a silicone catheter to allow for ethanol/antibiotic lock therapy.    Discussion:  50yo M with complicated post Celestino-en-Y course requiring TPN for chronic malnutrition presents with likely line infection, subsequent to several prior line infections requiring replacement of line. Though some organisms growing on 1/28 and 1/29 cultures do appear likely contaminants, there are several that are more concerning, such as Staphylococcus epidermidis, Enterococcus, and Corynebacterium.     Very much appreciate primary team having line removed promptly! We will continue watching daily cultures for any further growth. If they remain clear, should be okay to replace line on Friday. The ideal catheter for ethanol/antibiotic lock therapy is silicone-based. We recently had a patient receive one of these at [facility] with the catheter coming  from lot# [id number]. We have contacted IR regarding this and they are aware.    Koko Sebastian MD  Division of Infectious Diseases and International Medicine  P: [phone]        History of Present Illness:     Mr. Acevedo is a 49 yo M with a history of Celestino-en-Y gastric bypass (has lost nearly 300 pounds as a result) and esophagojejunostomy complicated by short gut syndrome and chronic malnutriion requiring TPN, as well as several incidents of polymicrobial central line infection resulting in line replacement. He presented on 1/30 for another suspected such episode.     Per patient and concurrent notes from ID colleague Dr. Buckner, Mr. Acevedo began having intermittent fevers about one week prior to 1/29. He states that, in the past, he develops fever, loose stool, and fatigue, which is similar to what he developed this time. This prompted him to contact Dr. Buckner, who then ordered blood cultures, which were collected from his port (two sets collected on both 1/28 and 1/29). When the 1/29 cultures began growing GPCs, patient was advised to come to the ED for evaluation and admission. Since that time, his cultures have grown a litany of organisms (not uncommon in his episodes), including: Streptococcus salivarius, Streptococcus mitis, Staphylococcus hominis, Stahylococcus capitis, Staphylococcus epidermidis, Enterococcus faecalis, Aerococcus viridans, Corynebacterium jeikeium (all on 1/29 sets) and GPB resembling diphteroids, Corynebacterium, Enterococcus faecalis, Staphylococcus hominis. (all on 1/28 sets).     Initially, he was started on ceftriaxone x 1 dose, and then transitioned to Linezolid x 1 dose, and since 1/31 has been on vancomycin. Blood cultures collected on 1/31 (one peripheral and one port, though different port than 1/29 sets) have remained clear. Patient has been afebrile with normal white count throughout this admission.    Prior line infections in the past 2 years have grown:  Staphylococcus lugdunensis, Psychrobacter faecalis, Corynebacterium, Staphylococcus epidermidis, Finegoldia magna, Granulicatella adiacens, Streptococcus salivarius, Rothia mucilaginosa, Candida albicans         Review of Systems:   Full 10 point ROS obtained, pertinent positives and negatives as above.       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     Complication of anesthesia      Difficulty swallowing      Gastric ulcer, unspecified as acute or chronic, without mention of hemorrhage, perforation, or obstruction      Gastro-oesophageal reflux disease      Head injury      Hiatal hernia      Other bladder disorder      Other chronic pain      PONV (postoperative nausea and vomiting)      Severe malnutrition (H)     TPN     Short gut syndrome      Tobacco abuse      Past Surgical History:   Procedure Laterality Date     AMPUTATION       APPENDECTOMY       BACK SURGERY  11/3/2014    curve in the spine     BIOPSY LYMPH NODE CERVICAL N/A 2/20/2015    Procedure: BIOPSY LYMPH NODE CERVICAL;  Surgeon: Baron Scanoln MD;  Location: [facility] OR     C GASTRIC BYPASS,OBESE<100CM SHAYLEE-EN-Y  2002    lost 300 pounds     CHOLECYSTECTOMY       DISCECTOMY, FUSION CERVICAL ANTERIOR ONE LEVEL, COMBINED N/A 2/15/2017    Procedure: COMBINED DISCECTOMY, FUSION CERVICAL ANTERIOR ONE LEVEL;  Surgeon: Darren Campos MD;  Location: [facility]     ENDOSCOPIC INSERTION TUBE GASTROSTOMY  9/9/2013    Procedure: ENDOSCOPIC INSERTION TUBE GASTROSTOMY;;  Surgeon: Francis Vyas MD;  [facility]     ENDOSCOPIC ULTRASOUND UPPER GASTROINTESTINAL TRACT (GI)  4/29/2011    Procedure:ENDOSCOPIC ULTRASOUND UPPER GASTROINTESTINAL TRACT (GI); Both  Procedures done Conjointly; Surgeon:NEREIDA HOUSER; [facility]     ENDOSCOPIC ULTRASOUND UPPER GASTROINTESTINAL TRACT (GI)  9/9/2013    Procedure: ENDOSCOPIC ULTRASOUND UPPER GASTROINTESTINAL TRACT (GI);  Endoscopic Ultrasound Guide Gastrostomy Tube Placement  C-arm;  Surgeon: Noe Lizarraga MD;  [facility]     ENDOSCOPY  03/25/11    EGD, [facility]     ENDOSCOPY  08/04/09    Upper Endoscopy, [facility]     ENDOSCOPY  01/05/09    Upper Endoscopy, [facility]     ESOPHAGOSCOPY, GASTROSCOPY, DUODENOSCOPY (EGD), COMBINED  4/20/2011    Procedure:COMBINED ESOPHAGOSCOPY, GASTROSCOPY, DUODENOSCOPY (EGD); Surgeon:FRANCIS VYAS; [facility]     ESOPHAGOSCOPY, GASTROSCOPY, DUODENOSCOPY (EGD), COMBINED  6/15/2011    Procedure:COMBINED ESOPHAGOSCOPY, GASTROSCOPY, DUODENOSCOPY (EGD); Surgeon:FRANCIS VYAS; [facility]     ESOPHAGOSCOPY, GASTROSCOPY, DUODENOSCOPY (EGD), COMBINED  6/12/2013    Procedure: COMBINED ESOPHAGOSCOPY, GASTROSCOPY, DUODENOSCOPY (EGD);;  Surgeon: Francis Vyas MD;  [facility]     ESOPHAGOSCOPY, GASTROSCOPY, DUODENOSCOPY (EGD), COMBINED  11/22/2013    Procedure: COMBINED ESOPHAGOSCOPY, GASTROSCOPY, DUODENOSCOPY (EGD);;  Surgeon: Francis Vyas MD;  [facility]     ESOPHAGOSCOPY, GASTROSCOPY, DUODENOSCOPY (EGD), COMBINED  4/30/2014    Procedure: COMBINED ESOPHAGOSCOPY, GASTROSCOPY, DUODENOSCOPY (EGD);  Surgeon: Francis Vyas MD;  [facility]     ESOPHAGOSCOPY, GASTROSCOPY, DUODENOSCOPY (EGD), COMBINED N/A 2/20/2015    Procedure: COMBINED ESOPHAGOSCOPY, GASTROSCOPY, DUODENOSCOPY (EGD), BIOPSY SINGLE OR MULTIPLE;  Surgeon: Baron Scanlon MD;  Location:  OR     ESOPHAGOSCOPY, GASTROSCOPY, DUODENOSCOPY (EGD), COMBINED N/A 9/30/2015    Procedure: COMBINED ESOPHAGOSCOPY, GASTROSCOPY, DUODENOSCOPY (EGD);  Surgeon: Francis Vyas MD;  Location:  GI     ESOPHAGOSCOPY, GASTROSCOPY, DUODENOSCOPY (EGD),  COMBINED N/A 10/3/2019    Procedure: Upper Endoscopy;  Surgeon: Clif Morrow MD;  [facility]     GASTRECTOMY  6/22/2012    Procedure: GASTRECTOMY;  Open Approach, Excise Ulcers,Partial Gastrectomy, Esophagojejunostomy, Hiatal Hernia Repair, Extensive Lysis of Adhesions and Esaphagogastrodudenoscopy.;  Surgeon: Francis Vyas MD;  [facility]     GASTROJEJUNOSTOMY  08/26/09    Extensice enterolysis, partial resect. jejunum, part. resect gastric pouch, gastrojejunostomy anastomosis     HC ESOPH/GAS REFLUX TEST W NASAL IMPED ELECTRODE  8/5/2013    Procedure: ESOPHAGEAL IMPEDENCE FUNCTION TEST 1 HOUR OR LESS;  Surgeon: Halie Lang MD;  [facility]     HEAD & NECK SURGERY  2/15/2017    C5-C6     HERNIA REPAIR  2006    Umbilical hernia     HERNIORRHAPHY HIATAL  6/22/2012    Procedure: HERNIORRHAPHY HIATAL;;  Surgeon: Francis Vyas MD;  [facility]     HERNIORRHAPHY INGUINAL  11/22/2013    Procedure: HERNIORRHAPHY INGUINAL;;  Surgeon: Francis Vyas MD;  [facility]     INSERT PICC LINE Right 12/19/2019    Procedure: Picc Placement;  Surgeon: Per Dumont PA-C;  [facility]     INSERT PICC LINE Right 2/21/2020    Procedure: INSERTION, PICC;  Surgeon: Per Dumont PA-C;  [facility]     INSERT PORT VASCULAR ACCESS Right 12/19/2017    Procedure: INSERT PORT VASCULAR ACCESS;  Right Chest Port Placement ;  Surgeon: Lisandro Alejandro PA-C;  [facility]     INSERT PORT VASCULAR ACCESS Right 8/2/2018    Procedure: INSERT PORT VASCULAR ACCESS;  Place single lumen tunneled central venous access catheter;  Surgeon: Guy Jamil PA-C;  [facility]     IR CVC TUNNEL PLACEMENT > 5 YRS OF AGE  8/7/2019     IR CVC TUNNEL PLACEMENT > 5 YRS OF AGE  4/14/2020     IR CVC TUNNEL PLACEMENT > 5 YRS OF AGE  8/3/2020     IR CVC TUNNEL PLACEMENT > 5 YRS OF AGE  9/4/2020     IR CVC TUNNEL REMOVAL RIGHT  10/1/2019     IR CVC TUNNEL REMOVAL  RIGHT  7/30/2020     IR CVC TUNNEL REMOVAL RIGHT  9/2/2020     IR CVC TUNNEL REMOVAL RIGHT  2/3/2021     IR FOLLOW UP VISIT OUTPATIENT  8/7/2019     IR PICC PLACEMENT > 5 YRS OF AGE  3/7/2019     IR PICC PLACEMENT > 5 YRS OF AGE  12/19/2019     IR PICC PLACEMENT > 5 YRS OF AGE  2/21/2020     LAPAROTOMY EXPLORATORY  11/22/2013    Procedure: LAPAROTOMY EXPLORATORY;  Exploratory Laparotomy, Upper Endoscopy, Left Inguinal Hernia Repair;  Surgeon: Francis Vyas MD;  [facility]     ORTHOPEDIC SURGERY       PICC INSERTION Right 03/16/2017    5fr DL BioFlo PICC, 42cm (3cm external) in the R medial brachial vein w/ tip in the SVC RA junction.     PICC INSERTION Left 09/23/2017    5fr DL BioFlo PICC, 45cm (1cm external) in the L basilic vein w/ tip in the SVC RA junction.     PICC INSERTION Right 05/16/2019    5Fr - 43cm, Medial brachial vein, low SVC     PICC INSERTION Right 10/02/2019    5Fr - 43cm (2cm external), basilic vein, low SVC     SHAYLEE EN Y BOWEL  2003     SOFT TISSUE SURGERY       THORACIC SURGERY       TONSILLECTOMY       TRANSESOPHAGEAL ECHOCARDIOGRAM INTRAOPERATIVE N/A 1/8/2019    Procedure: TRANSESOPHAGEAL ECHOCARDIOGRAM INTRAOPERATIVE;  Surgeon: GENERIC ANESTHESIA PROVIDER;  Location[facility]         Allergies:      Allergies   Allergen Reactions     Bactrim [Sulfamethoxazole W/Trimethoprim] Rash     Penicillins Anaphylaxis     Please see Antimicrobial Management Team allergy assessment note 10/10/2018. Patient reported tolerating amoxicillin.     Doxycycline Rash     Vancomycin Rash     Rash after receiving vancomycin 3/28/16 (red man's?). Tolerated with slower infusion and diphenhydramine premed.            Current Antimicrobials:     Vancomycin       Family History:     Family History   Problem Relation Age of Onset     Gastrointestinal Disease Mother         Crohns disease     Anxiety Disorder Mother      Thyroid Disease Mother         Grave's disease     Cancer Father         ear cancer-skin  cancer/melanoma     Breast Cancer Maternal Grandmother      Macular Degeneration Maternal Grandfather      Anxiety Disorder Sister      Diabetes Maternal Uncle      Breast Cancer Other      Hypertension No family hx of      Hyperlipidemia No family hx of      Cerebrovascular Disease No family hx of      Prostate Cancer No family hx of      Depression No family hx of      Anesthesia Reaction No family hx of      Asthma No family hx of      Osteoporosis No family hx of      Genetic Disorder No family hx of      Obesity No family hx of      Mental Illness No family hx of      Substance Abuse No family hx of      Glaucoma No family hx of         Social History:     Social History     Socioeconomic History     Marital status:      Spouse name: Rose     Number of children: 1     Years of education: Not on file     Highest education level: GED or equivalent   Occupational History     Not on file   Social Needs     Financial resource strain: Not hard at all     Food insecurity     Worry: Never true     Inability: Never true     Transportation needs     Medical: No     Non-medical: No   Tobacco Use     Smoking status: Current Some Day Smoker     Types: Cigarettes     Smokeless tobacco: Never Used     Tobacco comment: 2/4/2021    smokes 3 cigarettes/day   Substance and Sexual Activity     Alcohol use: No     Frequency: Never     Drinks per session: Patient refused     Binge frequency: Never     Comment: quit 2002     Drug use: No     Sexual activity: Yes     Partners: Female     Birth control/protection: None     Comment: no protection   Lifestyle     Physical activity     Days per week: 2 days     Minutes per session: 10 min     Stress: Only a little   Relationships     Social connections     Talks on phone: Once a week     Gets together: Never     Attends Church service: Never     Active member of club or organization: No     Attends meetings of clubs or organizations: Never     Relationship status:       Intimate partner violence     Fear of current or ex partner: No     Emotionally abused: No     Physically abused: No     Forced sexual activity: No   Other Topics Concern     Parent/sibling w/ CABG, MI or angioplasty before 65F 55M? No   Social History Narrative     Not on file            Physical Exam:   Ranges forvital signs:  Temp:  [95.7  F (35.4  C)-98.1  F (36.7  C)] 97.8  F (36.6  C)  Pulse:  [63-82] 69  Resp:  [16-20] 16  BP: (103-120)/(55-80) 116/66  SpO2:  [96 %-100 %] 97 %    Intake/Output Summary (Last 24 hours) at 2/2/2021 1906  Last data filed at 2/2/2021 1400  Gross per 24 hour   Intake 1520 ml   Output --   Net 1520 ml       Exam:  GENERAL:  well-developed, well-nourished, sitting in bed in no acute distress.   ENT:  Head is normocephalic, atraumatic. Oropharynx is moist without exudates or ulcers.  EYES:  Eyes have anicteric sclerae.    NECK:  Supple.  LUNGS:  Clear to auscultation.  CARDIOVASCULAR:  Regular rate and rhythm with no murmurs, gallops or rubs.  ABDOMEN:  Normal bowel sounds, soft  EXT: Extremities warm and without edema.  SKIN:  No acute rashes.  NEUROLOGIC:  Grossly nonfocal.         Laboratory Data:     Inflammatory Markers    Recent Labs   Lab Test 01/29/21  2312 12/14/20  1415 11/17/20  1445 07/28/20  1607 05/05/20  1218 04/28/20  1245 11/02/19  1853 10/30/19  1330 05/14/19  1145 05/14/19  1145 01/23/18  0845 01/23/18  0845 01/20/18  1030 09/24/17  1900 09/24/17  1900 06/28/17  2222 06/28/17  2222 03/12/17  0351 11/01/16  1530 11/01/16  1530   SED  --   --   --  10  --   --   --   --   --  13  --  10 11  --  31*  --  26* 17*  --  27*   CRP <2.9 <2.9 <2.9 <2.9 <2.9 <2.9 <2.9 <2.9   < >  --    < > <2.9 5.4   < > 26.6*   < > 53.0* 3.4   < > 8.4*    < > = values in this interval not displayed.       Hematology Studies    Recent Labs   Lab Test 02/04/21  0540 02/03/21  0552 01/31/21  0639 01/30/21  0619 01/29/21  2312 01/28/21  1730 01/26/21  1230 01/12/21  1300 12/29/20  1015  12/14/20  1415 11/17/20  1445 11/17/20  1445 09/01/20  0550 09/01/20  0550 08/31/20  1822 07/28/20  1607 07/28/20  1607   WBC 5.6 5.8 8.8 8.9 9.4 7.7 5.9 6.5 6.7 6.7   < > 4.5   < > 7.6 9.2   < > 6.8   ANEU  --   --   --   --  5.7 4.4 3.6 3.0 4.2 3.2   < > 2.2   < > 4.0 6.1   < > 4.0   AEOS  --   --   --   --  0.1 0.1  --   --   --   --   --  0.2  --  0.2 0.1  --  0.1   HGB 10.3* 11.3* 11.4* 12.4* 12.1* 12.2* 11.5* 10.2* 11.1* 10.1*   < > 9.5*   < > 11.3* 11.9*   < > 12.9*   MCV 93 96 90 90 89 90 89 89 86 89   < > 92   < > 92 91   < > 92    168 226 243 239 244 211 181 211 165   < > 129*   < > 192 220   < > 189    < > = values in this interval not displayed.     Metabolic Studies     Recent Labs   Lab Test 02/04/21  0540 02/03/21  0552 02/02/21  0605 02/01/21  0546 01/31/21  0639 01/29/21  2312    141 140  --  138 140   POTASSIUM 3.7 3.8 3.6  --  3.7 3.8   CHLORIDE 109 109 108  --  105 108   CO2 28 29 32  --  30 28   BUN 16 17 13  --  18 14   CR 0.81 0.82 0.87 0.73 0.82 0.67   GFRESTIMATED >90 >90 >90 >90 >90 >90     Hepatic Studies    Recent Labs   Lab Test 02/03/21  0552 01/29/21  2312 01/26/21  1230 01/12/21  1300 12/29/20  1015 12/14/20  1415   BILITOTAL 0.4 1.0 0.3 0.7 0.3 0.5   ALKPHOS 63 69 66 80 71 70   ALBUMIN 3.2* 3.4 3.2* 3.8 3.2* 3.4   AST 12 19 22 21 12 16   ALT 27 40 30 25 17 16       Microbiology:  Culture Micro   Date Value Ref Range Status   02/04/2021 No growth after 8 hours  Preliminary   02/03/2021 Culture negative monitoring continues  Preliminary   02/03/2021 No growth after 1 day  Preliminary   01/30/2021 No growth after 5 days  Preliminary   01/30/2021 No growth after 5 days  Preliminary   01/29/2021   Final    (Note)  POSITIVE for Staphylococci other than S.aureus, S.epidermidis and  S.lugdunensis, by [employer] multiplex nucleic acid test.  Coagulase-negative staphylococci are the most common venipuncture or  collection associated skin CONTAMINANTS grown in blood cultures.  Final  identification and antimicrobial susceptibility testing will be  verified by standard methods.    Specimen tested with Naveggigene multiplex, gram-positive blood culture  nucleic acid test for the following targets: Staph aureus, Staph  epidermidis, Staph lugdunensis, other Staph species, Enterococcus  faecalis, Enterococcus faecium, Streptococcus species, S. agalactiae,  S. anginosus grp., S. pneumoniae, S. pyogenes, Listeria sp., mecA  (methicillin resistance) and Melvin/B (vancomycin resistance).    Critical Value/Significant Value called to and read back by MARTHA PAULINO RN @ 1/30/21 2231      01/29/2021 (A)  Final    Cultured on the 1st day of incubation:  Staphylococcus hominis  Susceptibility testing done on previous specimen     01/29/2021 (A)  Final    Cultured on the 1st day of incubation:  Staphylococcus capitis     01/29/2021   Final    Critical Value/Significant Value, preliminary result only, called to and read back by  Martha Paulino RN 01/30/21 @1939 ANGEL     01/29/2021 (A)  Final    Cultured on the 1st day of incubation:  Staphylococcus epidermidis     01/29/2021 (A)  Final    Cultured on the 1st day of incubation:  Enterococcus faecalis  Susceptibility testing done on previous specimen     01/29/2021 (A)  Final    Cultured on the 1st day of incubation:  Aerococcus viridans     01/29/2021 (A)  Final    Cultured on the 1st day of incubation:  Corynebacterium jeikeium     01/29/2021   Final    Critical Value/Significant Value, preliminary result only, called to and read back by  Phillip Bowden RN [facility] at 1328 on 2.2.21 rd.     01/29/2021 (A)  Final    Cultured on the 1st day of incubation:  Streptococcus salivarius group     01/29/2021   Final    Critical Value/Significant Value, preliminary result only, called to and read back by  POLINA VAUGHN RN 01/30/21 1326 EH.     01/29/2021 (A)  Final    Cultured on the 1st day of incubation:  Streptococcus mitis group     01/29/2021   Final    Critical  Value/Significant Value, preliminary result only, called to and read back by  Phillip Bowden RN [facility] at 1328 on 2.2.21 rd.     01/28/2021 (A)  Final    Cultured on the 2nd day of incubation:  Gram positive bacilli resembling diphtheroids  No further identification     01/28/2021   Final    Critical Value/Significant Value, preliminary result only, called to and read back by  Jeet Paulino RN 01/30/21 @2007 ANGEL     01/28/2021 (A)  Final    Cultured on the 2nd day of incubation:  Corynebacterium species  Identification obtained by MALDI-TOF mass spectrometry research use only database. Test   characteristics determined and verified by the [facility]   ([facility]) [place], [place].     01/28/2021   Final    Critical Value/Significant Value, preliminary result only, called to and read back by  Phillip Bowden RN [facility] at 1328 on 2.2.21 rd.     01/28/2021 (A)  Final    Cultured on the 1st day of incubation:  Enterococcus faecalis     01/28/2021   Final    Critical Value/Significant Value, preliminary result only, called to and read back by   at 1325 1.29.21 KZ     01/28/2021 (A)  Final    Cultured on the 1st day of incubation:  Staphylococcus hominis     01/28/2021   Final    Critical Value/Significant Value, preliminary result only, called to and read back by  Dr. Buckner 1915 01.29.2021 NM     01/28/2021   Final    (Note)  POSITIVE for ENTEROCOCCUS FAECALIS and NEGATIVE for Melvin/vanB genes  by Verigene multiplex nucleic acid test. Final identification and  antimicrobial susceptibility testing will be verified by standard  methods.    Specimen tested with Verigene multiplex, gram-positive blood culture  nucleic acid test for the following targets: Staph aureus, Staph  epidermidis, Staph lugdunensis, other Staph species, Enterococcus  faecalis, Enterococcus faecium, Streptococcus species, S. agalactiae,  S. anginosus grp., S. pneumoniae, S. pyogenes, Listeria sp., mecA  (methicillin resistance) and  Melvin/B (vancomycin resistance).    Critical Value/Significant Value called to and read back by Dr. Buckner, 1.29.21 @ 1555 pt.    POSITIVE for Staphylococci other than S.aureus, S.epidermidis and  S.lugdunensis, by [address] [employer]igene multiplex nucleic acid test.  Coagulase-negative staphylococci are the most common venipuncture or  collection associated skin CONTAMINANTS grown in blood cultures.  Final identification and antimicrobial susceptibility testing will be  verified by standard methods.    Specimen tested with Verigene multiplex, gram-positive blood culture  nucleic acid test for the following targets: Staph aureus, Staph  epidermidis, Staph lugdunensis, other Staph species, Enterococcus  faecalis, Enterococcus faecium, Streptococcus species, S. agalactiae,  S. anginosus grp., S. pneumoniae, S. pyogenes, Listeria sp., mecA  (methicillin resistance) and Melvin/B (vancomycin resistance).    Critical Value/Significant Value called to and read back by Dr. Buckner  1917 01.29.2021 NM     11/17/2020 No growth  Final   11/17/2020 No growth  Final   11/17/2020 No growth  Final   10/29/2020 No growth  Final   10/29/2020 No growth  Final   09/03/2020 No growth  Final   09/03/2020 No growth  Final   09/02/2020 No growth  Final   09/02/2020 No growth  Final   09/02/2020 No growth  Final   09/02/2020 No growth  Final   09/02/2020 No growth  Final   09/01/2020 No growth  Final   09/01/2020 No growth  Final   08/31/2020 No growth  Final   08/31/2020 (A)  Final    Cultured on the 1st day of incubation:  Staphylococcus lugdunensis     08/31/2020   Final    Critical Value/Significant Value, preliminary result only, called to and read back by  Penny Alvarado RN @2200 09/01/2020 [facility]     08/29/2020 (A)  Final    Cultured on the 1st day of incubation:  Gram positive rods  Unable to further identify.     08/29/2020   Final    Critical Value/Significant Value, preliminary result only, called to and read back by  Dr. Sara Warren 2038 8/30/20 AM      08/29/2020 (A)  Final    Cultured on the 1st day of incubation:  Psychrobacter faecalis  Previously reported as:  Gram positive cocci  CORRECTED ON:  9.2.2020 at 1432. KVO  CORRECTED ON 09/11 AT 1505: PREVIOUSLY REPORTED AS Cultured on the 1st day of incubation:   Psychrobacter species Further speciated as: Psychrobacter faecalis Previously reported as:   Gram positive cocci CORRECTED ON: 9.2.2020 at 1432. KVO     08/29/2020   Final    Critical Value/Significant Value, preliminary result only, called to and read back by  Sandie Burroughs RN at 1412 9.1.20.DK     08/29/2020   Final    Corrected report called to and read back by  Sandie Burroughs RN on 9.2.2020 at 1432. KVO     08/29/2020   Final    (Note)  NEGATIVE for the following: Staphylococcus spp., Staph aureus, Staph  epidermidis, Staph lugdunensis, Streptococcus spp., Strep pneumoniae,  Strep pyogenes, Strep agalactiae, Strep anginosus group, Enterococcus  faecalis, Enterococcus faecium, and Listeria spp. by [employer]  multiplex nucleic acid test. Final identification and antimicrobial  susceptibility testing will be verified by standard methods.    Specimen tested with Verigene multiplex, gram-positive blood culture  nucleic acid test for the following targets: Staph aureus, Staph  epidermidis, Staph lugdunensis, other Staph species, Enterococcus  faecalis, Enterococcus faecium, Streptococcus species, S. agalactiae,  S. anginosus grp., S. pneumoniae, S. pyogenes, Listeria sp., mecA  (methicillin resistance) and Melvin/B (vancomycin resistance).    Critical Value/Significant Value called to and read back by  Dr. Sara Warren 2038 8/30/20 AM       08/28/2020 No growth  Final   08/28/2020 No growth  Final   07/30/2020 (A)  Final    <15 colonies   Staphylococcus epidermidis  Susceptibility testing not routinely done     07/29/2020 No growth  Final   07/29/2020 No growth  Final   07/28/2020 No growth  Final   07/28/2020 (A)  Final    Cultured on the 2nd day of  incubation:  Gram positive cocci in clusters  Upon further review, there is not any gram positive cocci in clusters present in this   blood culture.     07/28/2020 (A)  Final    Cultured on the 2nd day of incubation:  Corynebacterium species  Identification obtained by MALDI-TOF mass spectrometry research use only database. Test   characteristics determined and verified by the [facility]   ([facility]) [place], [place].     07/28/2020   Final    Critical Value/Significant Value, preliminary result only, called to and read back by  Richie Nugent RN 2223 7/30/20 AM     07/28/2020   Final    Updated report called to and read back by  Spring Lugo RN at 1300 on 8.2.20 ME     07/28/2020   Final    (Note)  NEGATIVE for the following: Staphylococcus spp., Staph aureus, Staph  epidermidis, Staph lugdunensis, Streptococcus spp., Strep pneumoniae,  Strep pyogenes, Strep agalactiae, Strep anginosus group, Enterococcus  faecalis, Enterococcus faecium, and Listeria spp. by [employer]  multiplex nucleic acid test. Final identification and antimicrobial  susceptibility testing will be verified by standard methods.    Critical Value/Significant Value called to and read back by LIZET BAUGH RN [id number] 0119 07.31.20 CF     07/26/2020 (A)  Final    Cultured on the 1st day of incubation:  Staphylococcus epidermidis  Susceptibility testing done on previous specimen     07/26/2020   Final    Critical Value/Significant Value, preliminary result only, called to and read back by  Neha Quintana Pharmacist \[facility]\"" 7.27.20 1735. BRANDON     07/26/2020 (A)  Final    Cultured on the 1st day of incubation:  Strain 2  Staphylococcus epidermidis  Susceptibility testing done on previous specimen     07/26/2020 (A)  Final    Cultured on the 1st day of incubation:  Staphylococcus epidermidis     07/26/2020   Final    Critical Value/Significant Value, preliminary result only, called to and read back by  Yaa Jarquin RN \[facility]\"" 7.27.20  1616. BRANDON     07/26/2020 (A)  Final    Cultured on the 1st day of incubation:  Strain 2  Staphylococcus epidermidis     07/26/2020   Final    (Note)  POSITIVE for STAPHYLOCOCCUS EPIDERMIDIS and POSITIVE for the mecA  gene (resistant to methicillin) by [employer] multiplex nucleic acid  test. Final identification and antimicrobial susceptibility testing  will be verified by standard methods.    Specimen tested with Verigene multiplex, gram-positive blood culture  nucleic acid test for the following targets: Staph aureus, Staph  epidermidis, Staph lugdunensis, other Staph species, Enterococcus  faecalis, Enterococcus faecium, Streptococcus species, S. agalactiae,  S. anginosus grp., S. pneumoniae, S. pyogenes, Listeria sp., mecA  (methicillin resistance) and Melvin/B (vancomycin resistance).    Critical Value/Significant Value called to and read back by Chantale Jarrett RN. @2003. 7.27.20. BS.        04/03/2020 No growth  Final   04/03/2020 No growth  Final   11/02/2019 No growth  Final   11/02/2019 No growth  Final   10/30/2019 No growth  Final   10/30/2019 (A)  Final    Cultured on the 3rd day of incubation:  Corynebacterium species  Identification obtained by MALDI-TOF mass spectrometry research use only database. Test   characteristics determined and verified by the [facility]   ([facility]) [place], [place].     10/30/2019   Final    Critical Value/Significant Value, preliminary result only, called to and read back by   DR VEGA @ 1745. 11/1/2019 AV     10/30/2019 (A)  Final    Cultured on the 4th day of incubation:  Finegoldia magna (Peptostreptococcus otto)     10/30/2019   Final    Critical Value/Significant Value, preliminary result only, called to and read back by  DANI SANCHES RN 0200 11.3.19 NDP     10/30/2019   Final    (Note)  NEGATIVE for the following: Staphylococcus spp., Staph aureus, Staph  epidermidis, Staph lugdunensis, Streptococcus spp., Strep pneumoniae,  Strep pyogenes,  Strep agalactiae, Strep anginosus group, Enterococcus  faecalis, Enterococcus faecium, and Listeria spp. by Verigene  multiplex nucleic acid test. Final identification and antimicrobial  susceptibility testing will be verified by standard methods.    Critical Value/Significant Value called to and read back by  Francis Vyas MD @ 1745. 11/1/19. AV.           NEGATIVE for the following: Staphylococcus spp., Staph aureus, Staph  epidermidis, Staph lugdunensis, Streptococcus spp., Strep pneumoniae,  Strep pyogenes, Strep agalactiae, Strep anginosus group, Enterococcus  faecalis, Enterococcus faecium, and Listeria spp. by Verigene  multiplex nucleic acid test. Final identification and antimicrobial  susceptibility testing will be verified by standard methods.    Critical Value/Significant Value called to and read back by DANI SANCHES RN 0503 11.3.19 ND     10/29/2019 No growth  Final   10/17/2019 No growth  Final   10/17/2019 No growth  Final   10/01/2019 No growth  Final   10/01/2019 No growth  Final   09/30/2019 No growth  Final   09/29/2019 No growth  Final   09/29/2019 No growth  Final   09/27/2019 (A)  Final    Cultured on the 1st day of incubation:  Granulicatella adiacens     09/27/2019   Final    Critical Value/Significant Value, preliminary result only, called to and read back by  Dr Vyas on 9.28.19 at 1632 bw     09/27/2019 (A)  Final    Cultured on the 1st day of incubation:  Staphylococcus epidermidis     09/27/2019   Final    Critical Value/Significant Value, preliminary result only, called to and read back by  paged to [facility] on 9.28.19 at 2327 bw     09/27/2019 (A)  Final    Cultured on the 1st day of incubation:  Streptococcus salivarius group     09/27/2019   Final    (Note)  POSITIVE for STREPTOCOCCUS SPECIES OTHER THAN pneumococcus, anginosus  group, S. pyogenes and S. agalactiae. Performed using [employer]igene  multiplex nucleic acid test. Final identification and antimicrobial  susceptibility  testing will be verified by standard methods.    POSITIVE for STAPHYLOCOCCUS EPIDERMIDIS and POSITIVE for the mecA  gene (resistant to methicillin) by [employer] multiplex nucleic acid  test. Final identification and antimicrobial susceptibility testing  will be verified by standard methods.    Specimen tested with Verigene multiplex, gram-positive blood culture  nucleic acid test for the following targets: Staph aureus, Staph  epidermidis, Staph lugdunensis, other Staph species, Enterococcus  faecalis, Enterococcus faecium, Streptococcus species, S. agalactiae,  S. anginosus grp., S. pneumoniae, S. pyogenes, Listeria sp., mecA  (methicillin resistance) and Melvin/B (vancomycin resistance).    Critical Value/Significant Value called to and read back by Estelle Hampton RN 9.27.19 @ 0227 AV     09/27/2019 No growth  Final   07/10/2019 No growth  Final   07/10/2019 No growth  Final   05/24/2019 No growth  Final   05/24/2019 No growth  Final   05/14/2019 No growth  Final   05/14/2019 No growth  Final   05/14/2019 No growth  Final     Vancomycin Levels    Recent Labs   Lab Test 02/04/21  1415 02/02/21  1306 01/31/21  1249   VANCOMYCIN 19.2 15.9 13.0          Imaging:     Reviewed. No recent imaging.

## 2021-02-05 ENCOUNTER — HOME INFUSION (PRE-WILLOW HOME INFUSION) (OUTPATIENT)
Dept: PHARMACY | Facility: CLINIC | Age: 51
End: 2021-02-05

## 2021-02-05 ENCOUNTER — APPOINTMENT (OUTPATIENT)
Dept: INTERVENTIONAL RADIOLOGY/VASCULAR | Facility: CLINIC | Age: 51
DRG: 315 | End: 2021-02-05
Attending: NURSE PRACTITIONER
Payer: COMMERCIAL

## 2021-02-05 VITALS
BODY MASS INDEX: 25.57 KG/M2 | WEIGHT: 188.8 LBS | DIASTOLIC BLOOD PRESSURE: 99 MMHG | RESPIRATION RATE: 15 BRPM | OXYGEN SATURATION: 99 % | TEMPERATURE: 97.3 F | HEART RATE: 68 BPM | SYSTOLIC BLOOD PRESSURE: 133 MMHG | HEIGHT: 72 IN

## 2021-02-05 LAB
BACTERIA SPEC CULT: NO GROWTH
SPECIMEN SOURCE: NORMAL

## 2021-02-05 PROCEDURE — 250N000013 HC RX MED GY IP 250 OP 250 PS 637: Performed by: INTERNAL MEDICINE

## 2021-02-05 PROCEDURE — 0JH63XZ INSERTION OF TUNNELED VASCULAR ACCESS DEVICE INTO CHEST SUBCUTANEOUS TISSUE AND FASCIA, PERCUTANEOUS APPROACH: ICD-10-PCS | Performed by: PHYSICIAN ASSISTANT

## 2021-02-05 PROCEDURE — 250N000011 HC RX IP 250 OP 636: Performed by: PHYSICIAN ASSISTANT

## 2021-02-05 PROCEDURE — 272N000602 HC WOUND GLUE CR1

## 2021-02-05 PROCEDURE — 99152 MOD SED SAME PHYS/QHP 5/>YRS: CPT | Performed by: PHYSICIAN ASSISTANT

## 2021-02-05 PROCEDURE — 250N000013 HC RX MED GY IP 250 OP 250 PS 637: Performed by: STUDENT IN AN ORGANIZED HEALTH CARE EDUCATION/TRAINING PROGRAM

## 2021-02-05 PROCEDURE — 250N000009 HC RX 250: Performed by: STUDENT IN AN ORGANIZED HEALTH CARE EDUCATION/TRAINING PROGRAM

## 2021-02-05 PROCEDURE — 99239 HOSP IP/OBS DSCHRG MGMT >30: CPT | Performed by: INTERNAL MEDICINE

## 2021-02-05 PROCEDURE — 258N000003 HC RX IP 258 OP 636: Performed by: INTERNAL MEDICINE

## 2021-02-05 PROCEDURE — 250N000011 HC RX IP 250 OP 636: Performed by: INTERNAL MEDICINE

## 2021-02-05 PROCEDURE — C1751 CATH, INF, PER/CENT/MIDLINE: HCPCS

## 2021-02-05 PROCEDURE — 250N000009 HC RX 250: Performed by: PHYSICIAN ASSISTANT

## 2021-02-05 PROCEDURE — 76937 US GUIDE VASCULAR ACCESS: CPT | Mod: 26 | Performed by: PHYSICIAN ASSISTANT

## 2021-02-05 PROCEDURE — 250N000013 HC RX MED GY IP 250 OP 250 PS 637: Performed by: EMERGENCY MEDICINE

## 2021-02-05 PROCEDURE — 76937 US GUIDE VASCULAR ACCESS: CPT

## 2021-02-05 PROCEDURE — 99152 MOD SED SAME PHYS/QHP 5/>YRS: CPT

## 2021-02-05 PROCEDURE — 02H633Z INSERTION OF INFUSION DEVICE INTO RIGHT ATRIUM, PERCUTANEOUS APPROACH: ICD-10-PCS | Performed by: PHYSICIAN ASSISTANT

## 2021-02-05 PROCEDURE — 250N000011 HC RX IP 250 OP 636: Performed by: NURSE PRACTITIONER

## 2021-02-05 PROCEDURE — 36558 INSERT TUNNELED CV CATH: CPT | Performed by: PHYSICIAN ASSISTANT

## 2021-02-05 PROCEDURE — 272N000504 HC NEEDLE CR4

## 2021-02-05 PROCEDURE — 77001 FLUOROGUIDE FOR VEIN DEVICE: CPT

## 2021-02-05 PROCEDURE — 99231 SBSQ HOSP IP/OBS SF/LOW 25: CPT | Performed by: STUDENT IN AN ORGANIZED HEALTH CARE EDUCATION/TRAINING PROGRAM

## 2021-02-05 PROCEDURE — 77001 FLUOROGUIDE FOR VEIN DEVICE: CPT | Mod: 26 | Performed by: PHYSICIAN ASSISTANT

## 2021-02-05 RX ORDER — HEPARIN SODIUM,PORCINE 10 UNIT/ML
5 VIAL (ML) INTRAVENOUS
Status: COMPLETED | OUTPATIENT
Start: 2021-02-05 | End: 2021-02-05

## 2021-02-05 RX ORDER — NALOXONE HYDROCHLORIDE 0.4 MG/ML
0.4 INJECTION, SOLUTION INTRAMUSCULAR; INTRAVENOUS; SUBCUTANEOUS
Status: DISCONTINUED | OUTPATIENT
Start: 2021-02-05 | End: 2021-02-05 | Stop reason: HOSPADM

## 2021-02-05 RX ORDER — DEXTROSE MONOHYDRATE 25 G/50ML
25-50 INJECTION, SOLUTION INTRAVENOUS
Status: DISCONTINUED | OUTPATIENT
Start: 2021-02-05 | End: 2021-02-05 | Stop reason: HOSPADM

## 2021-02-05 RX ORDER — FENTANYL 25 UG/1
1 PATCH TRANSDERMAL
Refills: 0 | Status: SHIPPED | DISCHARGE
Start: 2021-02-08 | End: 2021-03-19

## 2021-02-05 RX ORDER — NICOTINE POLACRILEX 4 MG
15-30 LOZENGE BUCCAL
Status: DISCONTINUED | OUTPATIENT
Start: 2021-02-05 | End: 2021-02-05 | Stop reason: HOSPADM

## 2021-02-05 RX ORDER — FENTANYL CITRATE 50 UG/ML
25-50 INJECTION, SOLUTION INTRAMUSCULAR; INTRAVENOUS EVERY 5 MIN PRN
Status: DISCONTINUED | OUTPATIENT
Start: 2021-02-05 | End: 2021-02-05 | Stop reason: HOSPADM

## 2021-02-05 RX ORDER — NALOXONE HYDROCHLORIDE 0.4 MG/ML
0.2 INJECTION, SOLUTION INTRAMUSCULAR; INTRAVENOUS; SUBCUTANEOUS
Status: DISCONTINUED | OUTPATIENT
Start: 2021-02-05 | End: 2021-02-05 | Stop reason: HOSPADM

## 2021-02-05 RX ORDER — HEPARIN SODIUM,PORCINE 10 UNIT/ML
5-10 VIAL (ML) INTRAVENOUS
Status: DISCONTINUED | OUTPATIENT
Start: 2021-02-05 | End: 2021-02-05 | Stop reason: HOSPADM

## 2021-02-05 RX ORDER — FLUMAZENIL 0.1 MG/ML
0.2 INJECTION, SOLUTION INTRAVENOUS
Status: DISCONTINUED | OUTPATIENT
Start: 2021-02-05 | End: 2021-02-05 | Stop reason: HOSPADM

## 2021-02-05 RX ORDER — HEPARIN SODIUM,PORCINE 10 UNIT/ML
5 VIAL (ML) INTRAVENOUS EVERY 24 HOURS
Status: DISCONTINUED | OUTPATIENT
Start: 2021-02-05 | End: 2021-02-05 | Stop reason: HOSPADM

## 2021-02-05 RX ORDER — OXYCODONE HCL 5 MG/5 ML
5-10 SOLUTION, ORAL ORAL EVERY 4 HOURS PRN
Refills: 0 | Status: ON HOLD | DISCHARGE
Start: 2021-02-05 | End: 2021-02-24

## 2021-02-05 RX ADMIN — SUCRALFATE 1 G: 1 SUSPENSION ORAL at 11:17

## 2021-02-05 RX ADMIN — OXYCODONE HYDROCHLORIDE 10 MG: 5 SOLUTION ORAL at 16:03

## 2021-02-05 RX ADMIN — CARVEDILOL 6.25 MG: 6.25 TABLET, FILM COATED ORAL at 18:34

## 2021-02-05 RX ADMIN — VANCOMYCIN HYDROCHLORIDE 1750 MG: 10 INJECTION, POWDER, LYOPHILIZED, FOR SOLUTION INTRAVENOUS at 04:14

## 2021-02-05 RX ADMIN — OXYCODONE HYDROCHLORIDE 10 MG: 5 SOLUTION ORAL at 08:18

## 2021-02-05 RX ADMIN — FENTANYL CITRATE 50 MCG: 50 INJECTION, SOLUTION INTRAMUSCULAR; INTRAVENOUS at 14:20

## 2021-02-05 RX ADMIN — Medication 1 MG: at 19:22

## 2021-02-05 RX ADMIN — CARVEDILOL 6.25 MG: 6.25 TABLET, FILM COATED ORAL at 08:18

## 2021-02-05 RX ADMIN — OXYCODONE HYDROCHLORIDE 10 MG: 5 SOLUTION ORAL at 03:11

## 2021-02-05 RX ADMIN — DIPHENHYDRAMINE HYDROCHLORIDE 25 MG: 25 SOLUTION ORAL at 16:03

## 2021-02-05 RX ADMIN — FENTANYL CITRATE 50 MCG: 50 INJECTION, SOLUTION INTRAMUSCULAR; INTRAVENOUS at 14:13

## 2021-02-05 RX ADMIN — Medication 2.4 ML: at 14:40

## 2021-02-05 RX ADMIN — FENTANYL 1 PATCH: 25 PATCH, EXTENDED RELEASE TRANSDERMAL at 11:15

## 2021-02-05 RX ADMIN — LIDOCAINE HYDROCHLORIDE 30 ML: 20 SOLUTION ORAL; TOPICAL at 03:19

## 2021-02-05 RX ADMIN — LIDOCAINE HYDROCHLORIDE 13 ML: 10 INJECTION, SOLUTION EPIDURAL; INFILTRATION; INTRACAUDAL; PERINEURAL at 14:21

## 2021-02-05 RX ADMIN — NICOTINE 7 MG/24 HR DAILY TRANSDERMAL PATCH 1 PATCH: at 11:17

## 2021-02-05 RX ADMIN — VANCOMYCIN HYDROCHLORIDE 1750 MG: 10 INJECTION, POWDER, LYOPHILIZED, FOR SOLUTION INTRAVENOUS at 16:43

## 2021-02-05 RX ADMIN — QUETIAPINE FUMARATE 50 MG: 50 TABLET ORAL at 19:21

## 2021-02-05 RX ADMIN — SUCRALFATE 1 G: 1 SUSPENSION ORAL at 04:23

## 2021-02-05 RX ADMIN — DEXTROAMPHETAMINE SACCHARATE, AMPHETAMINE ASPARTATE, DEXTROAMPHETAMINE SULFATE AND AMPHETAMINE SULFATE 20 MG: 2.5; 2.5; 2.5; 2.5 TABLET ORAL at 08:17

## 2021-02-05 RX ADMIN — Medication 5 ML: at 19:23

## 2021-02-05 RX ADMIN — MIDAZOLAM 1 MG: 1 INJECTION INTRAMUSCULAR; INTRAVENOUS at 14:20

## 2021-02-05 RX ADMIN — MIDAZOLAM 1 MG: 1 INJECTION INTRAMUSCULAR; INTRAVENOUS at 14:13

## 2021-02-05 RX ADMIN — DIPHENHYDRAMINE HYDROCHLORIDE 25 MG: 25 SOLUTION ORAL at 03:19

## 2021-02-05 ASSESSMENT — ACTIVITIES OF DAILY LIVING (ADL)
ADLS_ACUITY_SCORE: 11

## 2021-02-05 ASSESSMENT — MIFFLIN-ST. JEOR: SCORE: 1756.45

## 2021-02-05 NOTE — DISCHARGE SUMMARY
[facility]  Discharge Summary        Parker Acevedo MRN# [id number]   YOB: 1970 Age: 50 year old     Date of Admission:  1/29/2021  Date of Discharge:  2/5/2021  Admitting Physician:  Cecilia Bee MD  Discharge Physician: Chuy Frausto MD  Discharging Service: Hospitalist     Primary Provider:  Chuy Isaac  Primary Care Physician Phone Number: [phone]         Discharge Diagnoses/Problem Oriented Hospital Course (Providers):    Parker Acevedo was admitted on 1/29/2021 by Cecilia Bee MD and I would refer you to their history and physical.  The following problems were addressed during his hospitalization:      Parker Acevedo is a 50 year old male admitted on 1/29/2021. He is a 51 yo male with PMH significant for Celestino-en-Y gastric bypass, esophagojejunostomy c/b short gut syndrome and chronic malnutrition, and history of recurrent line-related infections, here after 1.5 days of fevers at home, found to have blood cultures positive for polymicrobial deepa     #E. Faecalis, staph hominis, staph capitis, staph epidermidis, corynebacterium, and aerococcus bacteremia  #Hx of recurrent polymicrobial bacteremia  Has multiple past infections for bactermia/CLABSI. Admitted 7/28/20 with MRSE from Cm CVC (replaced 8/3/2020). Admitted again 8/31/2020 with blood cultures positive for staph lugdunensis. Cm removed and replaced at that August '20 admission. Follows with ID outpatient (Dr. Buckner). He had ~2 days of fever prior to this admission, so ID arranged blood cultures outpatient. Positive for polymicrobial deepa.  - remove Cm today  - on vancomycin; abx per ID  - subsequent blood cultures are negative  - replaced with tunneled central line placed.     # Hx Celestino-en-Y gastric bypass, esophagojejunostomy c/b short gut syndrome and chronic malnutrition  # Chronic Abdominal Pain   Has been without G-tube for ~3 months,  scheduled to be replaced.  - Continue PTA tylenol, fentanyl, oxycodone  - Holding home TPN  - Continue PTA PRN acetaminophen, fentanyl patch, and oxycodone  - Continue PTA GI cocktail PRN  - Continue PTA PRN sucralfate     #HTN: continue PTA coreg  # Asthma: continue albuterol inhaler  # ADHD: continue adderall   # Anxiety: continue lorazepam prn (for TPN and meds)  # GERD: continue pta PPI   # Normocytic anemia: 12.1 here, at baseline. Monitor  # paranoia and inappropriate behavior. Prn quetiapine.       .Malnutrition:    - Level of malnutrition: Non-Severe   - Based on: weight loss, mild (or greater) subcutaneous fat loss, mild (or greater) muscle loss     Diet: Regular Diet Adult          Disposition Plan  -- home today  -- resume home care for TPN            Code Status:      Full Code         Important Results:      See below         Pending Results:        Unresulted Labs Ordered in the Past 30 Days of this Admission     Date and Time Order Name Status Description    2/3/2021 2330 Blood culture Preliminary     2/2/2021 2330 Blood culture Preliminary                Discharge Instructions and Follow-Up:      Follow-up Appointments     Adult [facility] [[email or web address] Follow-up and recommended labs and tests      Follow up with primary care provider, hCuy Isaac, within 7 days for   hospital follow- up.  No follow up labs or test are needed.      Appointments on [place] and/or [facility] (with [facility] [email or web address]] or [facility]   provider or service). Call [phone] if you haven't heard regarding   these appointments within 7 days of discharge.                  Discharge Disposition:      Discharged to home         Discharge Medications:        Current Discharge Medication List      CONTINUE these medications which have CHANGED    Details   fentaNYL (DURAGESIC) 25 mcg/hr 72 hr patch Place 1 patch onto the skin every 72 hours remove old patch.  Refills: 0    Associated Diagnoses: Status post cervical spinal arthrodesis      oxyCODONE  (ROXICODONE) 5 MG/5ML solution Take 5-10 mLs (5-10 mg) by mouth every 4 hours as needed for severe pain  Qty:  , Refills: 0    Associated Diagnoses: Status post cervical spinal arthrodesis         CONTINUE these medications which have NOT CHANGED    Details   acetaminophen (TYLENOL) 32 mg/mL liquid Take 15.65 mLs (500 mg) by mouth every 4 hours as needed for fever or mild pain  Qty: 455 mL, Refills: 1    Associated Diagnoses: S/P bariatric surgery      amphetamine-dextroamphetamine (ADDERALL) 20 MG tablet TAKE ONE TABLET BY MOUTH ONCE DAILY  Qty: 30 tablet, Refills: 0    Associated Diagnoses: ADHD (attention deficit hyperactivity disorder), inattentive type      carvedilol (COREG) 6.25 MG tablet Take 6.25 mg by mouth 2 times daily (with meals)      cyanocobalamin (CYANOCOBALAMIN) 1000 MCG/ML injection Inject 1 mL (1,000 mcg) into the muscle every 30 days  Qty: 1 mL, Refills: 11    Associated Diagnoses: Vitamin B12 deficiency (non anemic)      !! lidocaine 7.5 mL, alum & mag hydroxide-simethicone 7.5 mL GI Cocktail Take 15 mLs by mouth once as needed for mouth, throat or stomach pain  Qty: 1000 mL, Refills: 1    Associated Diagnoses: Gastric bypass status for obesity      LORazepam (ATIVAN) 1 MG tablet TAKE 1 TABLET (1MG) BY MOUTH AS NEEDED FOR ANXIETY WITH TPN AND MEDICATIONS . JUST ONCE A DAY (30 TO LAST 30 DAYS)  Qty: 30 tablet, Refills: 0    Associated Diagnoses: ADHD (attention deficit hyperactivity disorder), inattentive type      nicotine (NICODERM CQ) 21 MG/24HR 24 hr patch Place 1 patch onto the skin every 24 hours  Qty: 30 patch, Refills: 0    Associated Diagnoses: Former smoker      nystatin (MYCOSTATIN) 637959 UNIT/GM external cream Apply topically 2 times daily  Qty: 30 g, Refills: 0    Associated Diagnoses: Yeast infection of the skin      ondansetron (ZOFRAN-ODT) 8 MG ODT tab DISSOLVE ONE TABLET ON TONGUE EVERY 8 HOURS AS NEEDED FOR NAUSEA  Qty: 90 tablet, Refills: 1    Associated Diagnoses: Nausea     "  pantoprazole (PROTONIX) 40 MG EC tablet Take 1 tablet (40 mg) by mouth daily  Qty: 30 tablet, Refills: 5    Associated Diagnoses: S/P bariatric surgery      !! parenteral nutrition - PTA/DISCHARGE ORDER Patient receives 2050 mL TPN every 14 hours through PICC at [facility].      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 [facility] INFUSION MANAGED PATIENT Contact [facility] for patient specific medication information at [phone] on admission and discharge from the hospital.  Phones are answered 24 hours a day 7 days a week 365 days a year.    Providers - Choose \"CONTINUE HOME MED (no script)\" at discharge if patient treatment with home infusion will continue.    Comments: Resume prior to admission TPN/Lipids/saline  Associated Diagnoses: S/P bariatric surgery      Lidocaine (LIDOCARE) 4 % Patch Place 1 patch onto the skin every 24 hours To prevent lidocaine toxicity, patient should be patch free for 12 hrs daily.  Qty: 30 patch, Refills: 0    Associated Diagnoses: Chronic pain syndrome      !! lidocaine, alum & mag hydroxide-simethicone GI Cocktail 30 ml daily as needed for mouth/stomach pain.  Qty: 1 Bottle, Refills: 1    Associated Diagnoses: Gastric bypass status for obesity      naloxone (NARCAN) 4 MG/0.1ML nasal spray Spray 1 spray (4 mg) into one nostril alternating " nostrils as needed for opioid reversal every 2-3 minutes until assistance arrives  Qty: 0.2 mL, Refills: 0    Associated Diagnoses: Chronic pain syndrome      nicotine (COMMIT) 2 MG lozenge Place 1 lozenge (2 mg) inside cheek every hour as needed for smoking cessation  Qty: 30 lozenge, Refills: 0    Associated Diagnoses: Former smoker      polyethylene glycol (MIRALAX) 17 g packet Take 17 g by mouth daily  Qty: 72 packet, Refills: 6    Associated Diagnoses: Slow transit constipation      sucralfate (CARAFATE) 1 GM/10ML suspension Take 10 mLs (1 g) by mouth 4 times daily as needed  Qty: 420 mL, Refills: 1    Associated Diagnoses: Gastric bypass status for obesity       !! - Potential duplicate medications found. Please discuss with provider.               Allergies:         Allergies   Allergen Reactions     Bactrim [Sulfamethoxazole W/Trimethoprim] Rash     Penicillins Anaphylaxis     Please see Antimicrobial Management Team allergy assessment note 10/10/2018. Patient reported tolerating amoxicillin.     Doxycycline Rash     Vancomycin Rash     Rash after receiving vancomycin 3/28/16 (red man's?). Tolerated with slower infusion and diphenhydramine premed.            Consultations This Hospital Stay:      Consultation during this admission received from ID, IR, Psychiatry          Discharge Orders for Skilled Facility (from Discharge Orders):        After Care Instructions     Activity      Your activity upon discharge: activity as tolerated         Diet      Diet      Follow this diet upon discharge: Regular diet                    Rehab orders for Skilled Facility (from Discharge Orders):      Referrals     Future Labs/Procedures    Care Coordination Referral     Comments:    Services are provided by a Care Coordinator for people with complex needs   such as: medical, social, or financial troubles.  The Care Coordinator   works with the patient and their Primary Care Provider to determine health   goals, obtain  resources, achieve outcomes, and develop care plans that   help coordinate the patient's care.     Reason for Referral: Care Transition: Home care discharge    Additional pertinent details:         Parker Acevedo is a 50 year old male admitted on 1/29/2021. He is a   51 yo male with PMH significant for Celestino-en-Y gastric bypass,   esophagojejunostomy c/b short gut syndrome and chronic malnutrition, and   history of recurrent line-related infections, here after 1.5 days of   fevers at home, found to have blood cultures positive for polymicrobial   deepa.    Clinical Staff have discussed the Care Coordination Referral with the   patient and/or caregiver: No    Home care nursing referral     Comments:    [facility]   Phn: [phone]  Fax: [phone]    Resume previous home care services.    Home infusion referral     Comments:    [facility]  Phone  [phone]    Resume previous home infusion services for TPN and IV hydration (2L NS   daily).    Home infusion referral     Comments:    Resume home TPN             Discharge Time:       Greater than 30 minutes.        Image Results From This Hospital Stay (For Non-EPIC Providers):        Results for orders placed or performed during the hospital encounter of 01/29/21   IR CVC Tunnel Removal Right    Narrative    Parker Acevedo  [id number]    [id number]    PARKER ACEVEDO  [id number]  1970;  50 years    IR CVC TUNNEL REMOVAL RIGHT  RIJ tunneled CVC removal    -----    PRE-OPERATIVE DIAGNOSIS:  positive blood cultures    POST-OPERATIVE DIAGNOSIS:  Same    PROCEDURE:  Removal of tunneled central venous catheter (CVC)      Impression    IMPRESSION:  Completed removal of dual lumen tunneled central venous  access catheter via RIGHT chest. Tip saved for labs. Dx: positive  blood cultures; no longer desired. Omero.    -----    CLINICAL HISTORY:  The patient has a tunneled central venous catheter;   therapy was complete and catheter  "removal was requested, no longer  needed.    PERFORMED BY:  MOE August PA-C (Interventional  Radiology)    MEDICATIONS:  1% lidocaine was used for local anesthesia    DESCRIPTION:  The catheter and its entry site into the skin were  prepped and draped in usual sterile fashion.  Physical examination  demonstrated no erythema, tenderness, fluctuance, or discharge at the  catheter exit site or along the tract.  Lidocaine injection and  dissection were used around the catheter and subcutaneous cuff.     Using steady traction, the entire catheter was removed without  difficulty, cuff removed completely.  Compression was applied over the  venipuncture site, as well as along the tract, until good hemostasis  was achieved.  Sterile dressing was applied to the exit site wound.    COMPLICATIONS:  No immediate complication    ESTIMATED BLOOD LOSS:  Minimal    SPECIMENS:  None    TIME:  A total of 20 minutes was spent with the patient.  Greater than  50% of the time was spent in counseling, education, and coordination  of care.  -----    \"The physician assistant (PA) who performed this procedure and signed  the above report is licensed to practice in the [facility]  pursuant to [place] Statute 147A.09.  This includes meeting the Statute and  [place] Board of Medical Practice requirement of a collaborating  physician.\"  -----    SHALINI BOUDREAUX PA-C   IR CVC Tunnel Placement > 5 Yrs of Age    Narrative    Parker Shawn Hassan  [id number]    [id number]    PARKER SHAWN HASSAN  [id number]  1970;  50 years    IR CVC TUNNEL PLACEMENT > 5 YRS OF AGE  DL TCVC placement, silicone line per ID    -----    PRE-OPERATIVE DIAGNOSIS:  chronic TPN    POST-OPERATIVE DIAGNOSIS:  Same    PROCEDURE:  1.  Ultrasound guidance for venous access  2.  Placement centrally inserted catheter (age > 5 yrs.) tunneled, no  port, no pump.  RIGHT internal jugular vein placement.  3.  Fluoroscopic guidance placement      " Impression    IMPRESSION:  Completed placement of 10 Armenian dual lumen, silicone,  Mack brand, tunneled central venous access catheter via RIJV. Tip  lying in the right atrium. Okay to use immediately. Tunneled in a way  to avoid scar tissue. There was initial sluggish aspiration from both  lumens. Thought was perhaps a slight kink above the clavicle. This was  worked out and still some sluggish aspiration noted, but functional.  Thought is function related to catheter length and material causing  some sluggish return if forceful aspiration. Dx: chronic TPN. Omero.  2.1    Sedation medications administered: 100 mcg fentanyl and 2 mg versed   Sedation time: 15 minutes    -----    CLINICAL HISTORY:  Patient requires central venous access for therapy.   Tunneled central venous catheter placement requested.    PERFORMED BY:  MOE August PA-C (Interventional  Radiology)    CONSENT:  The patient understood the limitations, alternatives, and  risks of the procedure and agreed to the procedure.  Written informed  consent was obtained and is documented in the patient record.    SEDATION CONSENT:  It was explained to the patient that medications  used for sedation and pain relief may be administered, if needed, to  reduce anxiety and discomfort that may be associated with the  procedure.  Serious side effects from the medications are rare but may  include prolonged drowsiness and difficulty breathing, possibly  requiring placement of a breathing tube to ventilate the lungs.    MEDICATIONS:  Intravenous sedation was administered (see below).  1%  lidocaine was used for local anesthesia.  Each lumen was heparin  locked upon completion of placement.    NURSING:  The patient was placed on continuous vital signs monitoring.   Vital signs and sedation were monitored by nursing staff under my  supervision.    FLUOROSCOPY TIME (minutes):  2.1    DESCRIPTION:  The neck and upper chest were prepped and draped in  "the  usual sterile fashion.  Under ultrasound guidance, the internal  jugular vein was identified and the overlying skin was anesthetized,  skin dermatotomy was made.   Under ultrasound guidance, internal  jugular venipuncture was made with needle.      A permanent copy of the image documenting a patent vein was entered is  in the patient record.    Micro guidewire was advanced into the venous system under fluoroscopic  guidance.  Needle was exchanged over guidewire for a 3/5 Sammarinese  dilator and length to right atrium was measured.  Guidewire and 3  Sammarinese dilator removed.  An 0.035 Bentson wire was advanced to the  inferior vena cava through the 5 Sammarinese dilator and wire secured.      The anterior chest skin was anesthetized and small incision was made.   A cuffed catheter was then subcutaneously tunneled from the anterior  chest incision to the supraclavicular dermatotomy site and trimmed to  desired length.  The 5 Sammarinese dilator was exchanged over-the-wire for  a peel-away sheath and wire was removed.  Under fluoroscopic guidance,  the catheter was placed through the peel-away sheath, and peel-away  sheath was removed.      Final catheter position was documented with x-ray and stored in  patient record.    Both catheter lumens adequately aspirated and flushed.  A catheter  retention suture and sterile dressings were applied.  The skin  dermatotomy site overlying the internal jugular venipuncture was  closed with topical adhesive.    COMPLICATIONS:  No immediate complication; the patient remained stable  throughout the procedure.    ESTIMATED BLOOD LOSS (mL):  Less than 10    SPECIMENS:  None    -----  \"The physician assistant (PA) who performed this procedure and signed  the above report is licensed to practice in the state [facility]  pursuant to [place] Statute 147A.09.  This includes meeting the Statute and  [place] Board of Medical Practice requirement of a collaborating  physician.\"  -----    SHALINI " BRITTANY BOUDREAUX   Echo Limited    Narrative    [id number]  [id number]  [id number]  [id number]^CARLOS^KYLEE^J           [facility],[place]  Echocardiography Laboratory  [address] [place] [id number]     Name: SARA HASSAN  MRN: [id number]  : 1970  Study Date: 2021 02:09 PM  Age: 50 yrs  Gender: Male  Patient Location: [facility]  Reason For Study: Endocarditis  Ordering Physician: KYLEE GONZALEZ  Performed By: TED Miguel     BSA: 2.1 m2  Height: 72 in  Weight: 193 lb  BP: 120/67 mmHg  _____________________________________________________________________________  __        Procedure  Limited Portable Echo Adult.  _____________________________________________________________________________  __        Interpretation Summary  No evidence of vegetation on the aortic, tricuspid and mitral valve, the  pulmonic valve was not well visualized. If clinically indicated, consider RONEL  to evaluate for endocarditis.  Global and regional left ventricular function is normal with an EF of 55-60%.  The right ventricle is normal size. Global right ventricular function is  normal.  _____________________________________________________________________________  __        Left Ventricle  Global and regional left ventricular function is normal with an EF of 55-60%.     Right Ventricle  The right ventricle is normal size. Global right ventricular function is  normal.     Mitral Valve  The mitral valve is normal. Trace mitral insufficiency is present.        Aortic Valve  The valve leaflets are not well visualized. On Doppler interrogation, there is  no significant stenosis or regurgitation.     Tricuspid Valve  The tricuspid valve is normal. Trace tricuspid insufficiency is present.     Pulmonic Valve  The pulmonic valve cannot be assessed.     Pericardium  No pericardial effusion is present.     Compared to Previous Study  This study was compared with the study from 2020 . No  significant changes  noted.     _____________________________________________________________________________  __                       Report approved by: MD Jose Enrique Sanchez 02/02/2021 03:07 PM                 _____________________________________________________________________________  __        *Note: Due to a large number of results and/or encounters for the requested time period, some results have not been displayed. A complete set of results can be found in Results Review.           Most Recent Lab Results In EPIC (For Non-EPIC Providers):    Most Recent 3 CBC's:  Recent Labs   Lab Test 02/04/21  0540 02/03/21  0552 01/31/21  0639   WBC 5.6 5.8 8.8   HGB 10.3* 11.3* 11.4*   MCV 93 96 90    168 226      Most Recent 3 BMP's:  Recent Labs   Lab Test 02/04/21  0540 02/03/21  0552 02/02/21  0605    141 140   POTASSIUM 3.7 3.8 3.6   CHLORIDE 109 109 108   CO2 28 29 32   BUN 16 17 13   CR 0.81 0.82 0.87   ANIONGAP 5 4 <1*   SILAS 8.4* 8.5 8.3*   GLC 86 84 86     Most Recent 3 Troponin's:  Recent Labs   Lab Test 10/02/19  1907 10/02/19  1128 10/03/18  1941   TROPI <0.015 <0.015 <0.015     Most Recent 3 INR's:  Recent Labs   Lab Test 01/29/21  2312 09/04/20  0459 08/03/20  0600   INR 1.04 1.03 1.12     Most Recent 2 LFT's:  Recent Labs   Lab Test 02/03/21  0552 01/29/21  2312   AST 12 19   ALT 27 40   ALKPHOS 63 69   BILITOTAL 0.4 1.0     Most Recent Cholesterol Panel:  Recent Labs   Lab Test 01/26/21  1230 08/20/18  1442 08/20/18  1442 05/04/16  1042 05/04/16  1042   CHOL  --   --  109   < > 128   LDL  --   --   --   --  68   HDL  --   --   --   --  44   TRIG 155*   < > 71   < > 82    < > = values in this interval not displayed.     Most Recent 6 Bacteria Isolates From Any Culture (See EPIC Reports for Culture Details):  Recent Labs   Lab Test 02/04/21  0539 02/03/21  1055 02/03/21  0551 01/30/21  1148 01/30/21  1132 01/29/21  2348   CULT No growth after 1 day No growth No growth after  2 days No growth No growth Cultured on the 1st day of incubation:  Staphylococcus hominis  Susceptibility testing done on previous specimen  *  Cultured on the 1st day of incubation:  Staphylococcus capitis  *  Critical Value/Significant Value, preliminary result only, called to and read back by  Martha Paulino RN 01/30/21 @1939 ANGEL    Cultured on the 1st day of incubation:  Staphylococcus epidermidis  *  Cultured on the 1st day of incubation:  Enterococcus faecalis  Susceptibility testing done on previous specimen  *  Cultured on the 1st day of incubation:  Aerococcus viridans  *  Cultured on the 1st day of incubation:  Corynebacterium jeikeium  *  Critical Value/Significant Value, preliminary result only, called to and read back by  Phillip Bowden RN [email or web address] at 1328 on 2.2.21 rd.    (Note)  POSITIVE for Staphylococci other than S.aureus, S.epidermidis and  S.lugdunensis, by vogogo multiplex nucleic acid test.  Coagulase-negative staphylococci are the most common venipuncture or  collection associated skin CONTAMINANTS grown in blood cultures.  Final identification and antimicrobial susceptibility testing will be  verified by standard methods.    Specimen tested with Verigene multiplex, gram-positive blood culture  nucleic acid test for the following targets: Staph aureus, Staph  epidermidis, Staph lugdunensis, other Staph species, Enterococcus  faecalis, Enterococcus faecium, Streptococcus species, S. agalactiae,  S. anginosus grp., S. pneumoniae, S. pyogenes, Listeria sp., mecA  (methicillin resistance) and Melvin/B (vancomycin resistance).    Critical Value/Significant Value called to and read back by MARTHA PAULINO RN @ 1/30/21 2231 MK       Most Recent TSH, T4 and HgbA1c:  Recent Labs   Lab Test 05/12/19  2111 07/23/13  1036 07/23/13  1036   TSH 1.16   < >  --    A1C  --   --  4.9    < > = values in this interval not displayed.

## 2021-02-05 NOTE — IR NOTE
Patient Name: Parker Acevedo  Medical Record Number: [id number]  Today's Date: 2/5/2021    Procedure: Tunneled central line placement  Proceduralist: Chico Jamil PA-C    Procedure Start: 1411  Procedure end: 1447  Sedation medications administered: 100 mcg fentanyl and 2 mg versed     Report given to: Juan Pablo HARRISON 7D  : NA    Other Notes: Pt arrived to IR room 4 from 7D. Consent reviewed. Pt denies any questions or concerns regarding procedure. Pt positioned supine and monitored per protocol. Pt tolerated procedure without any noted complications. Pt transferred back to 7D.

## 2021-02-05 NOTE — CONSULTS
"    Interventional Radiology Consult Service Note    Patient is on IR schedule 2/5 for a DL, tunneled, CVC placement (silicone).   Labs WNL for procedure. COVID neg 1/30.  Pt is currently NPO and receiving IV antibiotics.  Consent will be done prior to procedure.     [place] [phone] for estimated time of procedure.     Case discussed with Dr. Fitch, Dr. Frausto and Dr. Sebastian. This is a 50 year old male with PMH significant for Celestino-en-Y gastric bypass, esophagojejunostomy c/b short gut syndrome and chronic malnutrition, and history of recurrent line-related infections, admitted 2/1 after 1.5 days of fevers at home, found to have blood cultures positive for polymicrobial deepa. IR removed the TCVC on 2/3. BCs have remained negative since 1/29. IR is consulted for TCVC placement.     ID is specifically requesting a silicone line for possible antibiotics vs ethanol locking in the future. They have experience with this have the necessary protocols. Cautioned that the caps on the CVC are made of polyurethane so they will not hold up to ethanol locks. Line will be heparin locked today per IR standard protocol.     Expected date of discharge: TBD    Vitals:   BP (!) 137/90 (BP Location: Right arm)   Pulse 62   Temp 97.5  F (36.4  C) (Oral)   Resp 16   Ht 1.816 m (5' 11.5\")   Wt 85.6 kg (188 lb 12.8 oz)   SpO2 100%   BMI 25.97 kg/m      Pertinent Labs:     Lab Results   Component Value Date    WBC 5.6 02/04/2021    WBC 5.8 02/03/2021    WBC 8.8 01/31/2021       Lab Results   Component Value Date    HGB 10.3 02/04/2021    HGB 11.3 02/03/2021    HGB 11.4 01/31/2021       Lab Results   Component Value Date     02/04/2021     02/03/2021     01/31/2021       Lab Results   Component Value Date    INR 1.04 01/29/2021    PTT 26 07/22/2019       Lab Results   Component Value Date    POTASSIUM 3.7 02/04/2021        SAILAJA Cordova CNP  Interventional Radiology  Pager: " [phone]

## 2021-02-05 NOTE — PROGRESS NOTES
VSS. Oxycodone X1 for abdominal pain. Carafate X1 for nausea. NPO. Chronically constipated. Ambulates in halls independently. L abd fistula with dsg c,d&i. Tunneled CVC placed today. Plan to have last dose of Vanco then discharge home with homecare for TPN. TPN will be delivered to his house tomorrow. Wife will be to here to  pt between 530 and 6

## 2021-02-05 NOTE — PROCEDURES
[facility]    Procedure: IR Procedure Note    Date/Time: 2/5/2021 2:51 PM  Performed by: Guy Jamil PA-C  Authorized by: Guy Jamil PA-C     UNIVERSAL PROTOCOL   Site Marked: NA  Prior Images Obtained and Reviewed:  Yes  Required items: Required blood products, implants, devices and special equipment available    Patient identity confirmed:  Verbally with patient, arm band, provided demographic data and hospital-assigned identification number  Patient was reevaluated immediately before administering moderate or deep sedation or anesthesia  Confirmation Checklist:  Patient's identity using two indicators, relevant allergies, procedure was appropriate and matched the consent or emergent situation and correct equipment/implants were available  Time out: Immediately prior to the procedure a time out was called    Universal Protocol: the Joint Commission Universal Protocol was followed    Preparation: Patient was prepped and draped in usual sterile fashion           ANESTHESIA    Anesthesia: Local infiltration  Local Anesthetic:  Lidocaine 1% without epinephrine      SEDATION    Patient Sedated: Yes    Vital signs: Vital signs monitored during sedation    See dictated procedure note for full details.  Findings: Sedation medications administered: 100 mcg fentanyl and 2 mg versed   Sedation time: 15 minutes    Specimens: none    Complications: None    Condition: Stable    PROCEDURE   Patient Tolerance:  Patient tolerated the procedure well with no immediate complications  Describe Procedure: Parker Acevedo  [id number]    Completed placement of 10 Romansh dual lumen, silicone, [employer] brand, tunneled central venous access catheter via RIJV. Tip lying in the right atrium. Okay to use immediately. There was initial sluggish aspiration from both lumens. Thought was perhaps a slight kink above the clavicle. This was worked out and still some  sluggish aspiration noted, but functional. Thought is function related to catheter length and material causing some sluggish return if forceful aspiration. Dx: chronic TPN. Omero. 2.1    Sedation medications administered: 100 mcg fentanyl and 2 mg versed   Sedation time: 15 minutes  Length of time physician/provider present for 1:1 monitoring during sedation: 15

## 2021-02-05 NOTE — PROGRESS NOTES
[facility] ID Service: Progress Note     Patient:  Parker Acevedo, Date of birth 1970, Medical record number [id number]  Date of Visit:  February 2, 2021  Consult Requested by: Sammi Trejo MD         Assessment and Recommendations:   Problem List:  1. 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 Port cultures from 1/28 growing: GPB resembling diphteroids, Corynebacterium, Enterococcus faecalis, Staphylococcus hominis.  B. Port cultures from 1/29 growing: Streptococcus salivarius, Streptococcus mitis, Staphylococcus hominis, Staphylococcus capitis, Staphylococcus epidermidis, Enterococcus faecalis, Aerococcus viridans, Corynebacterium jeikeium  2. Prior Celestino-en-Y gastric bypass, esophagojejunostomy  A. Complicated by short gut syndrome and chronic malnutrition requiring TPN.  3. GERD  4. HTN    Recommendations:  1. Appreciate IR assistance with having catheter replaced!  2. Given negative peripheral cultures throughout course as well as receiving ~7 days of vancomycin, okay to discharge without further antibiotics and new line in place.    ID will sign off at this time, but please do not hesitate to call or page with any questions or concerns!    Discussion:  50yo M with complicated post Celestino-en-Y course requiring TPN for chronic malnutrition presents with likely line infection, subsequent to several prior line infections requiring replacement of line. Though some organisms growing on 1/28 and 1/29 cultures do appear likely contaminants, there are several that are more concerning, such as Staphylococcus epidermidis, Enterococcus, and Corynebacterium.     Very much appreciate primary team having line removed promptly! Visited patient after new line was placed and he is doing quite well. Discussed plans for follow-up in instances of new fever, which he was agreeable to.    Koko Sebastian MD  Division of Infectious Diseases and International Medicine  P: [phone]         History of Present Illness:     Mr. Acevedo is a 49 yo M with a history of Celestino-en-Y gastric bypass (has lost nearly 300 pounds as a result) and esophagojejunostomy complicated by short gut syndrome and chronic malnutriion requiring TPN, as well as several incidents of polymicrobial central line infection resulting in line replacement. He presented on 1/30 for another suspected such episode.     Per patient and concurrent notes from ID colleague Dr. Buckner, Mr. Acevedo began having intermittent fevers about one week prior to 1/29. He states that, in the past, he develops fever, loose stool, and fatigue, which is similar to what he developed this time. This prompted him to contact Dr. Buckner, who then ordered blood cultures, which were collected from his port (two sets collected on both 1/28 and 1/29). When the 1/29 cultures began growing GPCs, patient was advised to come to the ED for evaluation and admission. Since that time, his cultures have grown a litany of organisms (not uncommon in his episodes), including: Streptococcus salivarius, Streptococcus mitis, Staphylococcus hominis, Stahylococcus capitis, Staphylococcus epidermidis, Enterococcus faecalis, Aerococcus viridans, Corynebacterium jeikeium (all on 1/29 sets) and GPB resembling diphteroids, Corynebacterium, Enterococcus faecalis, Staphylococcus hominis. (all on 1/28 sets).     Initially, he was started on ceftriaxone x 1 dose, and then transitioned to Linezolid x 1 dose, and since 1/31 has been on vancomycin. Blood cultures collected on 1/31 (one peripheral and one port, though different port than 1/29 sets) have remained clear. Patient has been afebrile with normal white count throughout this admission.    Prior line infections in the past 2 years have grown: Staphylococcus lugdunensis, Psychrobacter faecalis, Corynebacterium, Staphylococcus epidermidis, Finegoldia magna, Granulicatella adiacens, Streptococcus salivarius, Rothia mucilaginosa, Candida  albicans         Review of Systems:   Full 10 point ROS obtained, pertinent positives and negatives as above.       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     Complication of anesthesia      Difficulty swallowing      Gastric ulcer, unspecified as acute or chronic, without mention of hemorrhage, perforation, or obstruction      Gastro-oesophageal reflux disease      Head injury      Hiatal hernia      Other bladder disorder      Other chronic pain      PONV (postoperative nausea and vomiting)      Severe malnutrition (H)     TPN     Short gut syndrome      Tobacco abuse      Past Surgical History:   Procedure Laterality Date     AMPUTATION       APPENDECTOMY       BACK SURGERY  11/3/2014    curve in the spine     BIOPSY LYMPH NODE CERVICAL N/A 2/20/2015    Procedure: BIOPSY LYMPH NODE CERVICAL;  Surgeon: Baron Scanlon MD;  Location: [facility]     C GASTRIC BYPASS,OBESE<100CM SHAYLEE-EN-Y  2002    lost 300 pounds     CHOLECYSTECTOMY       DISCECTOMY, FUSION CERVICAL ANTERIOR ONE LEVEL, COMBINED N/A 2/15/2017    Procedure: COMBINED DISCECTOMY, FUSION CERVICAL ANTERIOR ONE LEVEL;  Surgeon: Darren Campos MD;  Location: [facility]     ENDOSCOPIC INSERTION TUBE GASTROSTOMY  9/9/2013    Procedure: ENDOSCOPIC INSERTION TUBE GASTROSTOMY;;  Surgeon: Francis Vyas MD;  [facility]     ENDOSCOPIC ULTRASOUND UPPER GASTROINTESTINAL TRACT (GI)  4/29/2011    Procedure:ENDOSCOPIC ULTRASOUND UPPER GASTROINTESTINAL TRACT (GI); Both Procedures done Conjointly; Surgeon:NEREIDA HOUSER; [facility]     ENDOSCOPIC ULTRASOUND UPPER GASTROINTESTINAL TRACT (GI)  9/9/2013    Procedure: ENDOSCOPIC ULTRASOUND UPPER GASTROINTESTINAL  TRACT (GI);  Endoscopic Ultrasound Guide Gastrostomy Tube Placement  C-arm;  Surgeon: Noe Lizarraga MD;  [facility]     ENDOSCOPY  03/25/11    EGD, [facility]     ENDOSCOPY  08/04/09    Upper Endoscopy, [facility]     ENDOSCOPY  01/05/09    Upper Endoscopy, [facility]     ESOPHAGOSCOPY, GASTROSCOPY, DUODENOSCOPY (EGD), COMBINED  4/20/2011    Procedure:COMBINED ESOPHAGOSCOPY, GASTROSCOPY, DUODENOSCOPY (EGD); Surgeon:FRANCIS VYAS; [facility]     ESOPHAGOSCOPY, GASTROSCOPY, DUODENOSCOPY (EGD), COMBINED  6/15/2011    Procedure:COMBINED ESOPHAGOSCOPY, GASTROSCOPY, DUODENOSCOPY (EGD); Surgeon:FRANCIS VYAS; [facility]     ESOPHAGOSCOPY, GASTROSCOPY, DUODENOSCOPY (EGD), COMBINED  6/12/2013    Procedure: COMBINED ESOPHAGOSCOPY, GASTROSCOPY, DUODENOSCOPY (EGD);;  Surgeon: Francis Vyas MD;  [facility]     ESOPHAGOSCOPY, GASTROSCOPY, DUODENOSCOPY (EGD), COMBINED  11/22/2013    Procedure: COMBINED ESOPHAGOSCOPY, GASTROSCOPY, DUODENOSCOPY (EGD);;  Surgeon: Francis Vyas MD;  [facility]     ESOPHAGOSCOPY, GASTROSCOPY, DUODENOSCOPY (EGD), COMBINED  4/30/2014    Procedure: COMBINED ESOPHAGOSCOPY, GASTROSCOPY, DUODENOSCOPY (EGD);  Surgeon: Francis Vyas MD;  [facility]     ESOPHAGOSCOPY, GASTROSCOPY, DUODENOSCOPY (EGD), COMBINED N/A 2/20/2015    Procedure: COMBINED ESOPHAGOSCOPY, GASTROSCOPY, DUODENOSCOPY (EGD), BIOPSY SINGLE OR MULTIPLE;  Surgeon: Baron Scanlon MD;  Location:  OR     ESOPHAGOSCOPY, GASTROSCOPY, DUODENOSCOPY (EGD), COMBINED N/A 9/30/2015    Procedure: COMBINED ESOPHAGOSCOPY, GASTROSCOPY, DUODENOSCOPY (EGD);  Surgeon: Francis Vyas MD;  [facility]     ESOPHAGOSCOPY, GASTROSCOPY, DUODENOSCOPY (EGD), COMBINED N/A 10/3/2019    Procedure: Upper Endoscopy;  Surgeon: Clif Morrow MD;  [facility]     GASTRECTOMY  6/22/2012    Procedure: GASTRECTOMY;  Open Approach, Excise Ulcers,Partial  Gastrectomy, Esophagojejunostomy, Hiatal Hernia Repair, Extensive Lysis of Adhesions and Esaphagogastrodudenoscopy.;  Surgeon: Francis Vyas MD;  [facility]     GASTROJEJUNOSTOMY  08/26/09    Extensice enterolysis, partial resect. jejunum, part. resect gastric pouch, gastrojejunostomy anastomosis     HC ESOPH/GAS REFLUX TEST W NASAL IMPED ELECTRODE  8/5/2013    Procedure: ESOPHAGEAL IMPEDENCE FUNCTION TEST 1 HOUR OR LESS;  Surgeon: Halie Lang MD;  [facility]     HEAD & NECK SURGERY  2/15/2017    C5-C6     HERNIA REPAIR  2006    Umbilical hernia     HERNIORRHAPHY HIATAL  6/22/2012    Procedure: HERNIORRHAPHY HIATAL;;  Surgeon: Francis Vyas MD;  [facility]     HERNIORRHAPHY INGUINAL  11/22/2013    Procedure: HERNIORRHAPHY INGUINAL;;  Surgeon: Francis Vyas MD;  [facility]     INSERT PICC LINE Right 12/19/2019    Procedure: Picc Placement;  Surgeon: Per Dumont PA-C;  [facility]     INSERT PICC LINE Right 2/21/2020    Procedure: INSERTION, PICC;  Surgeon: Per Dumont PA-C;  [facility]     INSERT PORT VASCULAR ACCESS Right 12/19/2017    Procedure: INSERT PORT VASCULAR ACCESS;  Right Chest Port Placement ;  Surgeon: Lisandro Alejandro PA-C;  [facility]     INSERT PORT VASCULAR ACCESS Right 8/2/2018    Procedure: INSERT PORT VASCULAR ACCESS;  Place single lumen tunneled central venous access catheter;  Surgeon: Guy Jamil PA-C;  [facility]     IR CVC TUNNEL PLACEMENT > 5 YRS OF AGE  8/7/2019     IR CVC TUNNEL PLACEMENT > 5 YRS OF AGE  4/14/2020     IR CVC TUNNEL PLACEMENT > 5 YRS OF AGE  8/3/2020     IR CVC TUNNEL PLACEMENT > 5 YRS OF AGE  9/4/2020     IR CVC TUNNEL PLACEMENT > 5 YRS OF AGE  2/5/2021     IR CVC TUNNEL REMOVAL RIGHT  10/1/2019     IR CVC TUNNEL REMOVAL RIGHT  7/30/2020     IR CVC TUNNEL REMOVAL RIGHT  9/2/2020     IR CVC TUNNEL REMOVAL RIGHT  2/3/2021     IR FOLLOW UP VISIT OUTPATIENT  8/7/2019      IR PICC PLACEMENT > 5 YRS OF AGE  3/7/2019     IR PICC PLACEMENT > 5 YRS OF AGE  12/19/2019     IR PICC PLACEMENT > 5 YRS OF AGE  2/21/2020     LAPAROTOMY EXPLORATORY  11/22/2013    Procedure: LAPAROTOMY EXPLORATORY;  Exploratory Laparotomy, Upper Endoscopy, Left Inguinal Hernia Repair;  Surgeon: Francis Vyas MD;  [facility]     ORTHOPEDIC SURGERY       PICC INSERTION Right 03/16/2017    5fr DL BioFlo PICC, 42cm (3cm external) in the R medial brachial vein w/ tip in the SVC RA junction.     PICC INSERTION Left 09/23/2017    5fr DL BioFlo PICC, 45cm (1cm external) in the L basilic vein w/ tip in the SVC RA junction.     PICC INSERTION Right 05/16/2019    5Fr - 43cm, Medial brachial vein, low SVC     PICC INSERTION Right 10/02/2019    5Fr - 43cm (2cm external), basilic vein, low SVC     SHAYLEE EN Y BOWEL  2003     SOFT TISSUE SURGERY       THORACIC SURGERY       TONSILLECTOMY       TRANSESOPHAGEAL ECHOCARDIOGRAM INTRAOPERATIVE N/A 1/8/2019    Procedure: TRANSESOPHAGEAL ECHOCARDIOGRAM INTRAOPERATIVE;  Surgeon: GENERIC ANESTHESIA PROVIDER;  [facility]         Allergies:      Allergies   Allergen Reactions     Bactrim [Sulfamethoxazole W/Trimethoprim] Rash     Penicillins Anaphylaxis     Please see Antimicrobial Management Team allergy assessment note 10/10/2018. Patient reported tolerating amoxicillin.     Doxycycline Rash     Vancomycin Rash     Rash after receiving vancomycin 3/28/16 (red man's?). Tolerated with slower infusion and diphenhydramine premed.            Current Antimicrobials:     Vancomycin       Family History:     Family History   Problem Relation Age of Onset     Gastrointestinal Disease Mother         Crohns disease     Anxiety Disorder Mother      Thyroid Disease Mother         Grave's disease     Cancer Father         ear cancer-skin cancer/melanoma     Breast Cancer Maternal Grandmother      Macular Degeneration Maternal Grandfather      Anxiety Disorder Sister      Diabetes  Maternal Uncle      Breast Cancer Other      Hypertension No family hx of      Hyperlipidemia No family hx of      Cerebrovascular Disease No family hx of      Prostate Cancer No family hx of      Depression No family hx of      Anesthesia Reaction No family hx of      Asthma No family hx of      Osteoporosis No family hx of      Genetic Disorder No family hx of      Obesity No family hx of      Mental Illness No family hx of      Substance Abuse No family hx of      Glaucoma No family hx of         Social History:     Social History     Socioeconomic History     Marital status:      Spouse name: Rose     Number of children: 1     Years of education: Not on file     Highest education level: GED or equivalent   Occupational History     Not on file   Social Needs     Financial resource strain: Not hard at all     Food insecurity     Worry: Never true     Inability: Never true     Transportation needs     Medical: No     Non-medical: No   Tobacco Use     Smoking status: Current Some Day Smoker     Types: Cigarettes     Smokeless tobacco: Never Used     Tobacco comment: 2/4/2021    smokes 3 cigarettes/day   Substance and Sexual Activity     Alcohol use: No     Frequency: Never     Drinks per session: Patient refused     Binge frequency: Never     Comment: quit 2002     Drug use: No     Sexual activity: Yes     Partners: Female     Birth control/protection: None     Comment: no protection   Lifestyle     Physical activity     Days per week: 2 days     Minutes per session: 10 min     Stress: Only a little   Relationships     Social connections     Talks on phone: Once a week     Gets together: Never     Attends Sabianist service: Never     Active member of club or organization: No     Attends meetings of clubs or organizations: Never     Relationship status:      Intimate partner violence     Fear of current or ex partner: No     Emotionally abused: No     Physically abused: No     Forced sexual activity:  No   Other Topics Concern     Parent/sibling w/ CABG, MI or angioplasty before 65F 55M? No   Social History Narrative     Not on file            Physical Exam:   Ranges forvital signs:  Temp:  [96.5  F (35.8  C)-97.5  F (36.4  C)] 97.5  F (36.4  C)  Pulse:  [53-78] 56  Resp:  [9-26] 12  BP: (109-147)/(62-96) 125/89  SpO2:  [96 %-100 %] 100 %    Intake/Output Summary (Last 24 hours) at 2/2/2021 1906  Last data filed at 2/2/2021 1400  Gross per 24 hour   Intake 1520 ml   Output --   Net 1520 ml       Exam:  GENERAL:  well-developed, well-nourished, sitting in bed in no acute distress.   ENT:  Head is normocephalic, atraumatic. Oropharynx is moist without exudates or ulcers.  EYES:  Eyes have anicteric sclerae.    NECK:  Supple.  LUNGS:  Clear to auscultation.  CARDIOVASCULAR:  Regular rate and rhythm with no murmurs, gallops or rubs.  ABDOMEN:  Normal bowel sounds, soft  EXT: Extremities warm and without edema.  SKIN:  No acute rashes.  NEUROLOGIC:  Grossly nonfocal.         Laboratory Data:     Inflammatory Markers    Recent Labs   Lab Test 01/29/21  2312 12/14/20  1415 11/17/20  1445 07/28/20  1607 05/05/20  1218 04/28/20  1245 11/02/19  1853 10/30/19  1330 05/14/19  1145 05/14/19  1145 01/23/18  0845 01/23/18  0845 01/20/18  1030 09/24/17  1900 09/24/17  1900 06/28/17  2222 06/28/17  2222 03/12/17  0351 11/01/16  1530 11/01/16  1530   SED  --   --   --  10  --   --   --   --   --  13  --  10 11  --  31*  --  26* 17*  --  27*   CRP <2.9 <2.9 <2.9 <2.9 <2.9 <2.9 <2.9 <2.9   < >  --    < > <2.9 5.4   < > 26.6*   < > 53.0* 3.4   < > 8.4*    < > = values in this interval not displayed.       Hematology Studies    Recent Labs   Lab Test 02/04/21  0540 02/03/21  0552 01/31/21  0639 01/30/21  0619 01/29/21  2312 01/28/21  1730 01/26/21  1230 01/12/21  1300 12/29/20  1015 12/14/20  1415 11/17/20  1445 11/17/20  1445 09/01/20  0550 09/01/20  0550 08/31/20  1822 07/28/20  1607 07/28/20  1607   WBC 5.6 5.8 8.8 8.9 9.4 7.7 5.9  6.5 6.7 6.7   < > 4.5   < > 7.6 9.2   < > 6.8   ANEU  --   --   --   --  5.7 4.4 3.6 3.0 4.2 3.2   < > 2.2   < > 4.0 6.1   < > 4.0   AEOS  --   --   --   --  0.1 0.1  --   --   --   --   --  0.2  --  0.2 0.1  --  0.1   HGB 10.3* 11.3* 11.4* 12.4* 12.1* 12.2* 11.5* 10.2* 11.1* 10.1*   < > 9.5*   < > 11.3* 11.9*   < > 12.9*   MCV 93 96 90 90 89 90 89 89 86 89   < > 92   < > 92 91   < > 92    168 226 243 239 244 211 181 211 165   < > 129*   < > 192 220   < > 189    < > = values in this interval not displayed.     Metabolic Studies     Recent Labs   Lab Test 02/04/21  0540 02/03/21  0552 02/02/21  0605 02/01/21  0546 01/31/21  0639 01/29/21  2312    141 140  --  138 140   POTASSIUM 3.7 3.8 3.6  --  3.7 3.8   CHLORIDE 109 109 108  --  105 108   CO2 28 29 32  --  30 28   BUN 16 17 13  --  18 14   CR 0.81 0.82 0.87 0.73 0.82 0.67   GFRESTIMATED >90 >90 >90 >90 >90 >90     Hepatic Studies    Recent Labs   Lab Test 02/03/21  0552 01/29/21  2312 01/26/21  1230 01/12/21  1300 12/29/20  1015 12/14/20  1415   BILITOTAL 0.4 1.0 0.3 0.7 0.3 0.5   ALKPHOS 63 69 66 80 71 70   ALBUMIN 3.2* 3.4 3.2* 3.8 3.2* 3.4   AST 12 19 22 21 12 16   ALT 27 40 30 25 17 16       Microbiology:  Culture Micro   Date Value Ref Range Status   02/04/2021 No growth after 1 day  Preliminary   02/03/2021 No growth  Final   02/03/2021 No growth after 2 days  Preliminary   01/30/2021 No growth  Final   01/30/2021 No growth  Final   01/29/2021   Final    (Note)  POSITIVE for Staphylococci other than S.aureus, S.epidermidis and  S.lugdunensis, by [employer] multiplex nucleic acid test.  Coagulase-negative staphylococci are the most common venipuncture or  collection associated skin CONTAMINANTS grown in blood cultures.  Final identification and antimicrobial susceptibility testing will be  verified by standard methods.    Specimen tested with [employer] multiplex, gram-positive blood culture  nucleic acid test for the following targets: Staph aureus,  Staph  epidermidis, Staph lugdunensis, other Staph species, Enterococcus  faecalis, Enterococcus faecium, Streptococcus species, S. agalactiae,  S. anginosus grp., S. pneumoniae, S. pyogenes, Listeria sp., mecA  (methicillin resistance) and Melvin/B (vancomycin resistance).    Critical Value/Significant Value called to and read back by MARTHA PAULINO RN @ 1/30/21 2231      01/29/2021 (A)  Final    Cultured on the 1st day of incubation:  Staphylococcus hominis  Susceptibility testing done on previous specimen     01/29/2021 (A)  Final    Cultured on the 1st day of incubation:  Staphylococcus capitis     01/29/2021   Final    Critical Value/Significant Value, preliminary result only, called to and read back by  Martha Paulino RN 01/30/21 @1939 ANGEL     01/29/2021 (A)  Final    Cultured on the 1st day of incubation:  Staphylococcus epidermidis     01/29/2021 (A)  Final    Cultured on the 1st day of incubation:  Enterococcus faecalis  Susceptibility testing done on previous specimen     01/29/2021 (A)  Final    Cultured on the 1st day of incubation:  Aerococcus viridans     01/29/2021 (A)  Final    Cultured on the 1st day of incubation:  Corynebacterium jeikeium     01/29/2021   Final    Critical Value/Significant Value, preliminary result only, called to and read back by  Phillip Bowden RN [facility] at 1328 on 2.2.21 rd.     01/29/2021 (A)  Final    Cultured on the 1st day of incubation:  Streptococcus salivarius group     01/29/2021   Final    Critical Value/Significant Value, preliminary result only, called to and read back by  POLINA VAUGHN RN 01/30/21 1326 EH.     01/29/2021 (A)  Final    Cultured on the 1st day of incubation:  Streptococcus mitis group     01/29/2021   Final    Critical Value/Significant Value, preliminary result only, called to and read back by  Phillip Bowden RN [facility] at 1328 on 2.2.21 rd.     01/28/2021 (A)  Final    Cultured on the 2nd day of incubation:  Gram positive bacilli resembling diphtheroids  No  further identification     01/28/2021   Final    Critical Value/Significant Value, preliminary result only, called to and read back by  Jeet Paulino RN 01/30/21 @2007 ANGEL     01/28/2021 (A)  Final    Cultured on the 2nd day of incubation:  Corynebacterium species  Identification obtained by MALDI-TOF mass spectrometry research use only database. Test   characteristics determined and verified by the [facility]   ([facility]) [place], [place].     01/28/2021   Final    Critical Value/Significant Value, preliminary result only, called to and read back by  Phillip Bowden RN [facility] at 1328 on 2.2.21 rd.     01/28/2021 (A)  Final    Cultured on the 1st day of incubation:  Enterococcus faecalis     01/28/2021   Final    Critical Value/Significant Value, preliminary result only, called to and read back by   at 1325 1.29.21 KZ     01/28/2021 (A)  Final    Cultured on the 1st day of incubation:  Staphylococcus hominis     01/28/2021   Final    Critical Value/Significant Value, preliminary result only, called to and read back by  Dr. Buckner 1915 01.29.2021 NM     01/28/2021   Final    (Note)  POSITIVE for ENTEROCOCCUS FAECALIS and NEGATIVE for Melvin/vanB genes  by [employer] multiplex nucleic acid test. Final identification and  antimicrobial susceptibility testing will be verified by standard  methods.    Specimen tested with [employer]ene multiplex, gram-positive blood culture  nucleic acid test for the following targets: Staph aureus, Staph  epidermidis, Staph lugdunensis, other Staph species, Enterococcus  faecalis, Enterococcus faecium, Streptococcus species, S. agalactiae,  S. anginosus grp., S. pneumoniae, S. pyogenes, Listeria sp., mecA  (methicillin resistance) and Melvin/B (vancomycin resistance).    Critical Value/Significant Value called to and read back by Dr. Buckner, 1.29.21 @ 7585 pt.    POSITIVE for Staphylococci other than S.aureus, S.epidermidis and  S.lugdunensis, by [employer]  nucleic acid test.  Coagulase-negative staphylococci are the most common venipuncture or  collection associated skin CONTAMINANTS grown in blood cultures.  Final identification and antimicrobial susceptibility testing will be  verified by standard methods.    Specimen tested with [employer] multiplex, gram-positive blood culture  nucleic acid test for the following targets: Staph aureus, Staph  epidermidis, Staph lugdunensis, other Staph species, Enterococcus  faecalis, Enterococcus faecium, Streptococcus species, S. agalactiae,  S. anginosus grp., S. pneumoniae, S. pyogenes, Listeria sp., mecA  (methicillin resistance) and Melvin/B (vancomycin resistance).    Critical Value/Significant Value called to and read back by Dr. Buckner  1917 01.29.2021 NM     11/17/2020 No growth  Final   11/17/2020 No growth  Final   11/17/2020 No growth  Final   10/29/2020 No growth  Final   10/29/2020 No growth  Final   09/03/2020 No growth  Final   09/03/2020 No growth  Final   09/02/2020 No growth  Final   09/02/2020 No growth  Final   09/02/2020 No growth  Final   09/02/2020 No growth  Final   09/02/2020 No growth  Final   09/01/2020 No growth  Final   09/01/2020 No growth  Final   08/31/2020 No growth  Final   08/31/2020 (A)  Final    Cultured on the 1st day of incubation:  Staphylococcus lugdunensis     08/31/2020   Final    Critical Value/Significant Value, preliminary result only, called to and read back by  Penny Alvarado RN @2200 09/01/2020 [facility]     08/29/2020 (A)  Final    Cultured on the 1st day of incubation:  Gram positive rods  Unable to further identify.     08/29/2020   Final    Critical Value/Significant Value, preliminary result only, called to and read back by  Dr. Sara Warren 2038 8/30/20 AM     08/29/2020 (A)  Final    Cultured on the 1st day of incubation:  Psychrobacter faecalis  Previously reported as:  Gram positive cocci  CORRECTED ON:  9.2.2020 at 1432. KVO  CORRECTED ON 09/11 AT 1505: PREVIOUSLY REPORTED AS Cultured  on the 1st day of incubation:   Psychrobacter species Further speciated as: Psychrobacter faecalis Previously reported as:   Gram positive cocci CORRECTED ON: 9.2.2020 at 1432. KVO     08/29/2020   Final    Critical Value/Significant Value, preliminary result only, called to and read back by  Sandie Burroughs RN at 1412 9.1.20.DK     08/29/2020   Final    Corrected report called to and read back by  Sandie Burroughs RN on 9.2.2020 at 1432. KVO     08/29/2020   Final    (Note)  NEGATIVE for the following: Staphylococcus spp., Staph aureus, Staph  epidermidis, Staph lugdunensis, Streptococcus spp., Strep pneumoniae,  Strep pyogenes, Strep agalactiae, Strep anginosus group, Enterococcus  faecalis, Enterococcus faecium, and Listeria spp. by [employer]  multiplex nucleic acid test. Final identification and antimicrobial  susceptibility testing will be verified by standard methods.    Specimen tested with Verigene multiplex, gram-positive blood culture  nucleic acid test for the following targets: Staph aureus, Staph  epidermidis, Staph lugdunensis, other Staph species, Enterococcus  faecalis, Enterococcus faecium, Streptococcus species, S. agalactiae,  S. anginosus grp., S. pneumoniae, S. pyogenes, Listeria sp., mecA  (methicillin resistance) and Melvin/B (vancomycin resistance).    Critical Value/Significant Value called to and read back by  Dr. Sara Warren 2038 8/30/20 AM       08/28/2020 No growth  Final   08/28/2020 No growth  Final   07/30/2020 (A)  Final    <15 colonies   Staphylococcus epidermidis  Susceptibility testing not routinely done     07/29/2020 No growth  Final   07/29/2020 No growth  Final   07/28/2020 No growth  Final   07/28/2020 (A)  Final    Cultured on the 2nd day of incubation:  Gram positive cocci in clusters  Upon further review, there is not any gram positive cocci in clusters present in this   blood culture.     07/28/2020 (A)  Final    Cultured on the 2nd day of incubation:  Corynebacterium  species  Identification obtained by MALDI-TOF mass spectrometry research use only database. Test   characteristics determined and verified by the [facility]   ([facility]) [place], [place].     07/28/2020   Final    Critical Value/Significant Value, preliminary result only, called to and read back by  Richie Nugent RN 2223 7/30/20 AM     07/28/2020   Final    Updated report called to and read back by  Spring Lugo RN at 1300 on 8.2.20 ME     07/28/2020   Final    (Note)  NEGATIVE for the following: Staphylococcus spp., Staph aureus, Staph  epidermidis, Staph lugdunensis, Streptococcus spp., Strep pneumoniae,  Strep pyogenes, Strep agalactiae, Strep anginosus group, Enterococcus  faecalis, Enterococcus faecium, and Listeria spp. by [employer]  multiplex nucleic acid test. Final identification and antimicrobial  susceptibility testing will be verified by standard methods.    Critical Value/Significant Value called to and read back by LIZET BAUGH RN U5B 0119 07.31.20 CF     07/26/2020 (A)  Final    Cultured on the 1st day of incubation:  Staphylococcus epidermidis  Susceptibility testing done on previous specimen     07/26/2020   Final    Critical Value/Significant Value, preliminary result only, called to and read back by  Neha Quintana Pharmacist [facility] 7.27.20 1735. BRANDON     07/26/2020 (A)  Final    Cultured on the 1st day of incubation:  Strain 2  Staphylococcus epidermidis  Susceptibility testing done on previous specimen     07/26/2020 (A)  Final    Cultured on the 1st day of incubation:  Staphylococcus epidermidis     07/26/2020   Final    Critical Value/Significant Value, preliminary result only, called to and read back by  Yaa Jarquin RN [facility] 7.27.20 1616. BRANDON     07/26/2020 (A)  Final    Cultured on the 1st day of incubation:  Strain 2  Staphylococcus epidermidis     07/26/2020   Final    (Note)  POSITIVE for STAPHYLOCOCCUS EPIDERMIDIS and POSITIVE for the mecA  gene (resistant to  methicillin) by [employer]ene multiplex nucleic acid  test. Final identification and antimicrobial susceptibility testing  will be verified by standard methods.    Specimen tested with Verigene multiplex, gram-positive blood culture  nucleic acid test for the following targets: Staph aureus, Staph  epidermidis, Staph lugdunensis, other Staph species, Enterococcus  faecalis, Enterococcus faecium, Streptococcus species, S. agalactiae,  S. anginosus grp., S. pneumoniae, S. pyogenes, Listeria sp., mecA  (methicillin resistance) and Melvin/B (vancomycin resistance).    Critical Value/Significant Value called to and read back by Chantale Jarrett RN. @2003. 7.27.20. BS.        04/03/2020 No growth  Final   04/03/2020 No growth  Final   11/02/2019 No growth  Final   11/02/2019 No growth  Final   10/30/2019 No growth  Final   10/30/2019 (A)  Final    Cultured on the 3rd day of incubation:  Corynebacterium species  Identification obtained by MALDI-TOF mass spectrometry research use only database. Test   characteristics determined and verified by the [facility] Diagnostic Laboratory   ([facility]) [place], [place].     10/30/2019   Final    Critical Value/Significant Value, preliminary result only, called to and read back by   DR VEGA @ 1745. 11/1/2019 AV     10/30/2019 (A)  Final    Cultured on the 4th day of incubation:  Finegoldia magna (Peptostreptococcus otto)     10/30/2019   Final    Critical Value/Significant Value, preliminary result only, called to and read back by  DANI SANCHES RN 0200 11.3.19 NDP     10/30/2019   Final    (Note)  NEGATIVE for the following: Staphylococcus spp., Staph aureus, Staph  epidermidis, Staph lugdunensis, Streptococcus spp., Strep pneumoniae,  Strep pyogenes, Strep agalactiae, Strep anginosus group, Enterococcus  faecalis, Enterococcus faecium, and Listeria spp. by [employer]  multiplex nucleic acid test. Final identification and antimicrobial  susceptibility testing will be verified by standard  methods.    Critical Value/Significant Value called to and read back by  Francis Vyas MD @ 1745. 11/1/19. AV.           NEGATIVE for the following: Staphylococcus spp., Staph aureus, Staph  epidermidis, Staph lugdunensis, Streptococcus spp., Strep pneumoniae,  Strep pyogenes, Strep agalactiae, Strep anginosus group, Enterococcus  faecalis, Enterococcus faecium, and Listeria spp. by [employer]  multiplex nucleic acid test. Final identification and antimicrobial  susceptibility testing will be verified by standard methods.    Critical Value/Significant Value called to and read back by DANI SANCHES RN 0503 11.3.19 NDP     10/29/2019 No growth  Final   10/17/2019 No growth  Final   10/17/2019 No growth  Final   10/01/2019 No growth  Final   10/01/2019 No growth  Final   09/30/2019 No growth  Final   09/29/2019 No growth  Final   09/29/2019 No growth  Final   09/27/2019 (A)  Final    Cultured on the 1st day of incubation:  Granulicatella adiacens     09/27/2019   Final    Critical Value/Significant Value, preliminary result only, called to and read back by  Dr Vyas on 9.28.19 at 1632 bw     09/27/2019 (A)  Final    Cultured on the 1st day of incubation:  Staphylococcus epidermidis     09/27/2019   Final    Critical Value/Significant Value, preliminary result only, called to and read back by  paged to [facility] on 9.28.19 at 2327 bw     09/27/2019 (A)  Final    Cultured on the 1st day of incubation:  Streptococcus salivarius group     09/27/2019   Final    (Note)  POSITIVE for STREPTOCOCCUS SPECIES OTHER THAN pneumococcus, anginosus  group, S. pyogenes and S. agalactiae. Performed using [employer]  multiplex nucleic acid test. Final identification and antimicrobial  susceptibility testing will be verified by standard methods.    POSITIVE for STAPHYLOCOCCUS EPIDERMIDIS and POSITIVE for the mecA  gene (resistant to methicillin) by [employer] multiplex nucleic acid  test. Final identification and antimicrobial  susceptibility testing  will be verified by standard methods.    Specimen tested with [facility] [employer] multiplex, gram-positive blood culture  nucleic acid test for the following targets: Staph aureus, Staph  epidermidis, Staph lugdunensis, other Staph species, Enterococcus  faecalis, Enterococcus faecium, Streptococcus species, S. agalactiae,  S. anginosus grp., S. pneumoniae, S. pyogenes, Listeria sp., mecA  (methicillin resistance) and Melvin/B (vancomycin resistance).    Critical Value/Significant Value called to and read back by Estelle Hampton RN 9.27.19 @ 0227 AV     09/27/2019 No growth  Final   07/10/2019 No growth  Final   07/10/2019 No growth  Final   05/24/2019 No growth  Final   05/24/2019 No growth  Final   05/14/2019 No growth  Final   05/14/2019 No growth  Final   05/14/2019 No growth  Final     Vancomycin Levels    Recent Labs   Lab Test 02/04/21  1415 02/02/21  1306 01/31/21  1249   VANCOMYCIN 19.2 15.9 13.0          Imaging:     Reviewed. No recent imaging.

## 2021-02-05 NOTE — PRE-PROCEDURE
GENERAL PRE-PROCEDURE:   Procedure:  TCVC  Date/Time:  2/5/2021 1:52 PM    Patient states understanding of procedure being performed: Yes    Appropriately NPO:  Yes  ASA Class:  Class 2- mild systemic disease, no acute problems, no functional limitations  Mallampati  :  Grade 1- soft palate, uvula, tonsillar pillars, and posterior pharyngeal wall visible  Lungs:  Lungs clear with good breath sounds bilaterally  Heart:  Normal heart sounds and rate  History & Physical reviewed:  History and physical reviewed and no updates needed  Statement of review:  I have reviewed the lab findings, diagnostic data, medications, and the plan for sedation

## 2021-02-05 NOTE — PROGRESS NOTES
Care Management Discharge Note    Discharge Date: 02/06/21    Discharge Disposition: Home    Discharge Services:  Home Care, Home Infusion    Discharge DME: None    Discharge Transportation: Car, family or friend will provide    Private pay costs discussed: Not applicable    PAS Confirmation Code: N/A   Patient/family educated on Medicare website which has current facility and service quality ratings: N/A    Education Provided on the Discharge Plan: Yes   Persons Notified of Discharge Plans: Patient, bedside RN, [facility], SW  Patient/Family in Agreement with the Plan: Yes    Handoff Referral Completed: Yes    Additional Information:    Per team, patient will discharge this evening after new line placement. Patient will not need IV antibiotics upon discharge, but will need to resume TPN per [facility] recommendations.     Writer updated [facility] liaison regarding above. AVS updated. IMM done.     Jenna Jaimes, RN, BSN, PHN  Care Coordinator   P: [phone], [facility]

## 2021-02-06 ENCOUNTER — PATIENT OUTREACH (OUTPATIENT)
Dept: CARE COORDINATION | Facility: CLINIC | Age: 51
End: 2021-02-06

## 2021-02-08 ENCOUNTER — TELEPHONE (OUTPATIENT)
Dept: INTERNAL MEDICINE | Facility: CLINIC | Age: 51
End: 2021-02-08

## 2021-02-08 ENCOUNTER — HOSPITAL ENCOUNTER (OUTPATIENT)
Dept: LAB | Facility: CLINIC | Age: 51
Discharge: HOME | End: 2021-02-08
Attending: SURGERY | Admitting: SURGERY
Payer: COMMERCIAL

## 2021-02-08 ENCOUNTER — MEDICAL CORRESPONDENCE (OUTPATIENT)
Dept: HEALTH INFORMATION MANAGEMENT | Facility: CLINIC | Age: 51
End: 2021-02-08

## 2021-02-08 LAB
ALBUMIN SERPL-MCNC: 3.5 G/DL (ref 3.4–5)
ALP SERPL-CCNC: 65 U/L (ref 40–150)
ALT SERPL W P-5'-P-CCNC: 23 U/L (ref 0–70)
ANION GAP SERPL CALCULATED.3IONS-SCNC: 4 MMOL/L (ref 3–14)
AST SERPL W P-5'-P-CCNC: 16 U/L (ref 0–45)
BASOPHILS # BLD AUTO: 0 10E9/L (ref 0–0.2)
BASOPHILS NFR BLD AUTO: 0.5 %
BILIRUB DIRECT SERPL-MCNC: <0.1 MG/DL (ref 0–0.2)
BILIRUB SERPL-MCNC: 0.3 MG/DL (ref 0.2–1.3)
BUN SERPL-MCNC: 14 MG/DL (ref 7–30)
CALCIUM SERPL-MCNC: 8.3 MG/DL (ref 8.5–10.1)
CHLORIDE SERPL-SCNC: 112 MMOL/L (ref 94–109)
CO2 SERPL-SCNC: 27 MMOL/L (ref 20–32)
CREAT SERPL-MCNC: 0.6 MG/DL (ref 0.66–1.25)
DIFFERENTIAL METHOD BLD: ABNORMAL
EOSINOPHIL # BLD AUTO: 0.2 10E9/L (ref 0–0.7)
EOSINOPHIL NFR BLD AUTO: 2.7 %
ERYTHROCYTE [DISTWIDTH] IN BLOOD BY AUTOMATED COUNT: 19.3 % (ref 10–15)
GFR SERPL CREATININE-BSD FRML MDRD: >90 ML/MIN/{1.73_M2}
GLUCOSE SERPL-MCNC: 87 MG/DL (ref 70–99)
HCT VFR BLD AUTO: 34.2 % (ref 40–53)
HGB BLD-MCNC: 10.9 G/DL (ref 13.3–17.7)
LYMPHOCYTES # BLD AUTO: 1.9 10E9/L (ref 0.8–5.3)
LYMPHOCYTES NFR BLD AUTO: 31 %
MAGNESIUM SERPL-MCNC: 2.2 MG/DL (ref 1.6–2.3)
MCH RBC QN AUTO: 29.2 PG (ref 26.5–33)
MCHC RBC AUTO-ENTMCNC: 31.9 G/DL (ref 31.5–36.5)
MCV RBC AUTO: 92 FL (ref 78–100)
MONOCYTES # BLD AUTO: 0.8 10E9/L (ref 0–1.3)
MONOCYTES NFR BLD AUTO: 13 %
NEUTROPHILS # BLD AUTO: 3.3 10E9/L (ref 1.6–8.3)
NEUTROPHILS NFR BLD AUTO: 52.8 %
PHOSPHATE SERPL-MCNC: 3.2 MG/DL (ref 2.5–4.5)
PLATELET # BLD AUTO: 151 10E9/L (ref 150–450)
POTASSIUM SERPL-SCNC: 3.4 MMOL/L (ref 3.4–5.3)
PROT SERPL-MCNC: 6.5 G/DL (ref 6.8–8.8)
RBC # BLD AUTO: 3.73 10E12/L (ref 4.4–5.9)
SODIUM SERPL-SCNC: 143 MMOL/L (ref 133–144)
TRIGL SERPL-MCNC: 49 MG/DL
WBC # BLD AUTO: 6.2 10E9/L (ref 4–11)

## 2021-02-08 PROCEDURE — 82248 BILIRUBIN DIRECT: CPT | Performed by: SURGERY

## 2021-02-08 PROCEDURE — 84100 ASSAY OF PHOSPHORUS: CPT | Performed by: SURGERY

## 2021-02-08 PROCEDURE — 85025 COMPLETE CBC W/AUTO DIFF WBC: CPT | Performed by: SURGERY

## 2021-02-08 PROCEDURE — 80053 COMPREHEN METABOLIC PANEL: CPT | Performed by: SURGERY

## 2021-02-08 PROCEDURE — 83735 ASSAY OF MAGNESIUM: CPT | Performed by: SURGERY

## 2021-02-08 PROCEDURE — 84478 ASSAY OF TRIGLYCERIDES: CPT | Performed by: SURGERY

## 2021-02-08 RX ORDER — MULTIVIT WITH MINERALS/LUTEIN
1 TABLET ORAL DAILY
COMMUNITY
Start: 2021-02-08 | End: 2021-03-19

## 2021-02-08 NOTE — PROGRESS NOTES
This is a recent snapshot of the patient's [facility] medical record.  For current drug dose and complete information and questions, call [phone] or In [facility] pool, fv home infusion ([id number])  CSN Number:  [id number]

## 2021-02-08 NOTE — TELEPHONE ENCOUNTER
Medications reconciled on [facility] form, changes noted on form.  Form to PCP for signature.    Margarita Sandy RN  [facility]

## 2021-02-08 NOTE — TELEPHONE ENCOUNTER
Home Health Certification and Plan of Care forms received.     Certification Period from 1/31/2021 to 3/31/2021.    Forms placed in [facility] folder.    YADIRA PortilloN, RN  [facility]

## 2021-02-09 ENCOUNTER — HOME INFUSION (PRE-WILLOW HOME INFUSION) (OUTPATIENT)
Dept: PHARMACY | Facility: CLINIC | Age: 51
End: 2021-02-09

## 2021-02-09 ENCOUNTER — PATIENT OUTREACH (OUTPATIENT)
Dept: FAMILY MEDICINE | Facility: CLINIC | Age: 51
End: 2021-02-09
Payer: COMMERCIAL

## 2021-02-09 DIAGNOSIS — Z53.9 DIAGNOSIS NOT YET DEFINED: Primary | ICD-10-CM

## 2021-02-09 LAB
BACTERIA SPEC CULT: NO GROWTH
SPECIMEN SOURCE: NORMAL

## 2021-02-09 PROCEDURE — G0179 MD RECERTIFICATION HHA PT: HCPCS | Performed by: INTERNAL MEDICINE

## 2021-02-09 NOTE — PROGRESS NOTES
Clinic Care Coordination Contact  Community Health Worker Follow Up    The patient was discharged from the hospital and the CHW follow up to see how the patient is feeling and if he had any issues or concerns that he wanted to discuss with the RN CC at this time.     The patient stated that he is feeling okay and didn't have anything that he wanted to discuss at this time.     Yudi Eid  Community Health Worker   [facility]  Care Coordination  [facility]  [email or web address]   Office: [phone]  Fax: [phone]

## 2021-02-10 ENCOUNTER — HOME INFUSION (PRE-WILLOW HOME INFUSION) (OUTPATIENT)
Dept: PHARMACY | Facility: CLINIC | Age: 51
End: 2021-02-10

## 2021-02-10 LAB
BACTERIA SPEC CULT: NO GROWTH
Lab: NORMAL
SPECIMEN SOURCE: NORMAL

## 2021-02-10 NOTE — PROGRESS NOTES
This is a recent snapshot of the patient's [facility] medical record.  For current drug dose and complete information and questions, call [phone] or In [facility] pool, fv home infusion ([id number])  CSN Number:  [id number]

## 2021-02-11 ENCOUNTER — HOME INFUSION (PRE-WILLOW HOME INFUSION) (OUTPATIENT)
Dept: PHARMACY | Facility: CLINIC | Age: 51
End: 2021-02-11

## 2021-02-12 NOTE — PROGRESS NOTES
This is a recent snapshot of the patient's [facility] medical record.  For current drug dose and complete information and questions, call [phone] or In Basket pool, fv home infusion ([id number])  CSN Number:  [id number]

## 2021-02-15 ENCOUNTER — HOME INFUSION (PRE-WILLOW HOME INFUSION) (OUTPATIENT)
Dept: PHARMACY | Facility: CLINIC | Age: 51
End: 2021-02-15

## 2021-02-15 ENCOUNTER — MEDICAL CORRESPONDENCE (OUTPATIENT)
Dept: HEALTH INFORMATION MANAGEMENT | Facility: CLINIC | Age: 51
End: 2021-02-15

## 2021-02-15 ENCOUNTER — HOSPITAL ENCOUNTER (OUTPATIENT)
Dept: LAB | Facility: CLINIC | Age: 51
Discharge: HOME | End: 2021-02-15
Attending: SURGERY | Admitting: SURGERY
Payer: COMMERCIAL

## 2021-02-15 LAB
ALBUMIN SERPL-MCNC: 3.5 G/DL (ref 3.4–5)
ALP SERPL-CCNC: 67 U/L (ref 40–150)
ALT SERPL W P-5'-P-CCNC: 23 U/L (ref 0–70)
ANION GAP SERPL CALCULATED.3IONS-SCNC: 3 MMOL/L (ref 3–14)
AST SERPL W P-5'-P-CCNC: 15 U/L (ref 0–45)
BASOPHILS # BLD AUTO: 0.1 10E9/L (ref 0–0.2)
BASOPHILS NFR BLD AUTO: 1.1 %
BILIRUB DIRECT SERPL-MCNC: 0.1 MG/DL (ref 0–0.2)
BILIRUB SERPL-MCNC: 0.4 MG/DL (ref 0.2–1.3)
BUN SERPL-MCNC: 16 MG/DL (ref 7–30)
CALCIUM SERPL-MCNC: 8.3 MG/DL (ref 8.5–10.1)
CHLORIDE SERPL-SCNC: 111 MMOL/L (ref 94–109)
CO2 SERPL-SCNC: 30 MMOL/L (ref 20–32)
CREAT SERPL-MCNC: 0.77 MG/DL (ref 0.66–1.25)
DIFFERENTIAL METHOD BLD: ABNORMAL
EOSINOPHIL NFR BLD AUTO: 5.3 %
ERYTHROCYTE [DISTWIDTH] IN BLOOD BY AUTOMATED COUNT: 18.4 % (ref 10–15)
GFR SERPL CREATININE-BSD FRML MDRD: >90 ML/MIN/{1.73_M2}
GLUCOSE SERPL-MCNC: 87 MG/DL (ref 70–99)
HCT VFR BLD AUTO: 35 % (ref 40–53)
HGB BLD-MCNC: 11 G/DL (ref 13.3–17.7)
IMM GRANULOCYTES # BLD: 0 10E9/L (ref 0–0.4)
IMM GRANULOCYTES NFR BLD: 0 %
LYMPHOCYTES # BLD AUTO: 1.8 10E9/L (ref 0.8–5.3)
LYMPHOCYTES NFR BLD AUTO: 32.6 %
MAGNESIUM SERPL-MCNC: 2 MG/DL (ref 1.6–2.3)
MCH RBC QN AUTO: 29.4 PG (ref 26.5–33)
MCHC RBC AUTO-ENTMCNC: 31.4 G/DL (ref 31.5–36.5)
MCV RBC AUTO: 94 FL (ref 78–100)
MONOCYTES # BLD AUTO: 0.8 10E9/L (ref 0–1.3)
MONOCYTES NFR BLD AUTO: 14.7 %
NEUTROPHILS # BLD AUTO: 2.5 10E9/L (ref 1.6–8.3)
NEUTROPHILS NFR BLD AUTO: 46.3 %
NRBC # BLD AUTO: 0 10*3/UL
NRBC BLD AUTO-RTO: 0 /100
PHOSPHATE SERPL-MCNC: 4.2 MG/DL (ref 2.5–4.5)
PLATELET # BLD AUTO: 143 10E9/L (ref 150–450)
POTASSIUM SERPL-SCNC: 4 MMOL/L (ref 3.4–5.3)
PROT SERPL-MCNC: 6.6 G/DL (ref 6.8–8.8)
RBC # BLD AUTO: 3.74 10E12/L (ref 4.4–5.9)
SODIUM SERPL-SCNC: 144 MMOL/L (ref 133–144)
TRIGL SERPL-MCNC: 68 MG/DL
WBC # BLD AUTO: 5.5 10E9/L (ref 4–11)

## 2021-02-15 PROCEDURE — 83735 ASSAY OF MAGNESIUM: CPT | Performed by: SURGERY

## 2021-02-15 PROCEDURE — 80053 COMPREHEN METABOLIC PANEL: CPT | Performed by: SURGERY

## 2021-02-15 PROCEDURE — 85025 COMPLETE CBC W/AUTO DIFF WBC: CPT | Performed by: SURGERY

## 2021-02-15 PROCEDURE — 82248 BILIRUBIN DIRECT: CPT | Performed by: SURGERY

## 2021-02-15 PROCEDURE — 84100 ASSAY OF PHOSPHORUS: CPT | Performed by: SURGERY

## 2021-02-15 PROCEDURE — 84478 ASSAY OF TRIGLYCERIDES: CPT | Performed by: SURGERY

## 2021-02-15 NOTE — PROGRESS NOTES
This is a recent snapshot of the patient's [facility] medical record.  For current drug dose and complete information and questions, call [phone] or In Basket pool, fv home infusion ([id number])  CSN Number:  [id number]

## 2021-02-16 NOTE — PROGRESS NOTES
This is a recent snapshot of the patient's [facility] medical record.  For current drug dose and complete information and questions, call [phone] or In Basket pool, fv home infusion ([id number])  CSN Number:  [id number]

## 2021-02-17 ENCOUNTER — HOME INFUSION (PRE-WILLOW HOME INFUSION) (OUTPATIENT)
Dept: PHARMACY | Facility: CLINIC | Age: 51
End: 2021-02-17

## 2021-02-18 NOTE — PROGRESS NOTES
This is a recent snapshot of the patient's [facility] medical record.  For current drug dose and complete information and questions, call [phone] or In Basket pool, fv home infusion ([id number])  CSN Number:  [id number]

## 2021-02-20 DIAGNOSIS — Z20.822 ENCOUNTER FOR LABORATORY TESTING FOR COVID-19 VIRUS: ICD-10-CM

## 2021-02-20 LAB
SARS-COV-2 RNA RESP QL NAA+PROBE: NORMAL
SPECIMEN SOURCE: NORMAL

## 2021-02-20 PROCEDURE — U0003 INFECTIOUS AGENT DETECTION BY NUCLEIC ACID (DNA OR RNA); SEVERE ACUTE RESPIRATORY SYNDROME CORONAVIRUS 2 (SARS-COV-2) (CORONAVIRUS DISEASE [COVID-19]), AMPLIFIED PROBE TECHNIQUE, MAKING USE OF HIGH THROUGHPUT TECHNOLOGIES AS DESCRIBED BY CMS-2020-01-R: HCPCS | Performed by: SURGERY

## 2021-02-20 PROCEDURE — U0005 INFEC AGEN DETEC AMPLI PROBE: HCPCS | Performed by: SURGERY

## 2021-02-21 LAB
LABORATORY COMMENT REPORT: NORMAL
SARS-COV-2 RNA RESP QL NAA+PROBE: NEGATIVE
SPECIMEN SOURCE: NORMAL

## 2021-02-22 ENCOUNTER — OFFICE VISIT (OUTPATIENT)
Dept: INTERNAL MEDICINE | Facility: CLINIC | Age: 51
End: 2021-02-22
Payer: COMMERCIAL

## 2021-02-22 VITALS
OXYGEN SATURATION: 99 % | SYSTOLIC BLOOD PRESSURE: 118 MMHG | WEIGHT: 186 LBS | RESPIRATION RATE: 16 BRPM | HEART RATE: 96 BPM | TEMPERATURE: 98.4 F | BODY MASS INDEX: 25.58 KG/M2 | DIASTOLIC BLOOD PRESSURE: 72 MMHG

## 2021-02-22 DIAGNOSIS — Z98.84 GASTRIC BYPASS STATUS FOR OBESITY: ICD-10-CM

## 2021-02-22 DIAGNOSIS — E46 MALNUTRITION, UNSPECIFIED TYPE (H): ICD-10-CM

## 2021-02-22 DIAGNOSIS — F41.9 CHRONIC ANXIETY: ICD-10-CM

## 2021-02-22 DIAGNOSIS — Z78.9 ON TOTAL PARENTERAL NUTRITION: ICD-10-CM

## 2021-02-22 DIAGNOSIS — Z01.818 PREOP GENERAL PHYSICAL EXAM: Primary | ICD-10-CM

## 2021-02-22 PROCEDURE — 99214 OFFICE O/P EST MOD 30 MIN: CPT | Performed by: INTERNAL MEDICINE

## 2021-02-22 RX ORDER — LORAZEPAM 2 MG/ML
1 CONCENTRATE ORAL
Qty: 30 ML | Refills: 0 | Status: SHIPPED | OUTPATIENT
Start: 2021-02-22 | End: 2021-03-25

## 2021-02-22 ASSESSMENT — PAIN SCALES - GENERAL: PAINLEVEL: MODERATE PAIN (4)

## 2021-02-22 NOTE — PROGRESS NOTES
[address] [address]  Phone: [phone]  Primary Provider: Chuy Isaac  Pre-op Performing Provider: CHUY ISAAC      PREOPERATIVE EVALUATION:  Today's date: 2/22/2021    Parker Acevedo is a 50 year old male who presents for a preoperative evaluation.    Surgical Information:  Surgery/Procedure: Endoscopic ultrasound and stent removal insertion, gastrostomy tube  [facility]  Surgeon: Dr. Bach and Dr. Vyas  Surgery Date: 2/24/21  Time of Surgery: 7:30 am  Where patient plans to recover: At home with family  Fax number for surgical facility: Note does not need to be faxed, will be available electronically in Epic.    Type of Anesthesia Anticipated: General    Assessment & Plan     The proposed surgical procedure is considered INTERMEDIATE risk.    Preop general physical exam    Gastric bypass status for obesity    Chronic anxiety  - LORazepam (ATIVAN) 2 MG/ML (HIGH CONC) solution; Take 0.5 mLs (1 mg) by mouth nightly as needed for anxiety or sleep    On total parenteral nutrition    Malnutrition, unspecified type (H)           Risks and Recommendations:  The patient has the following additional risks and recommendations for perioperative complications:  Social and Substance:    - Patient is taking medications for chronic pain   - Active nicotine user, advised smoking cessation  Infection:    - Increased risk of infectious endocarditis due to      Recurrent azevedo catheter infections    Medication Instructions:  Hold all medications except his Coreg and Fentanyl patch.     RECOMMENDATION:  APPROVAL GIVEN to proceed with proposed procedure, without further diagnostic evaluation.    Review of external notes as documented above recent hospital stay.           Subjective     HPI related to upcoming procedure: Long history of gastric surgery and is dependent on TPN for nutrition.  Had another infection, got new Azevedo, doing ok.  Needs surgery for g tube and drainage.      Preop Questions 2/22/2021   1. Have you ever had a heart attack or stroke? No   2. Have you ever had surgery on your heart or blood vessels, such as a stent placement, a coronary artery bypass, or surgery on an artery in your head, neck, heart, or legs? No   3. Do you have chest pain with activity? No   4. Do you have a history of  heart failure? UNKNOWN -    5. Do you currently have a cold, bronchitis or symptoms of other infection? No   6. Do you have a cough, shortness of breath, or wheezing? No   7. Do you or anyone in your family have previous history of blood clots? No   8. Do you or does anyone in your family have a serious bleeding problem such as prolonged bleeding following surgeries or cuts? No   9. Have you ever had problems with anemia or been told to take iron pills? No   10. Have you had any abnormal blood loss such as black, tarry or bloody stools? No   11. Have you ever had a blood transfusion? No   12. Are you willing to have a blood transfusion if it is medically needed before, during, or after your surgery? Yes   13. Have you or any of your relatives ever had problems with anesthesia? No   14. Do you have sleep apnea, excessive snoring or daytime drowsiness? No   15. Do you have any artifical heart valves or other implanted medical devices like a pacemaker, defibrillator, or continuous glucose monitor? No   16. Do you have artificial joints? No   17. Are you allergic to latex? No       Health Care Directive:  Patient has a Health Care Directive on file    Preoperative Review of :  Pain pills through pain clinic.       Status of Chronic Conditions:  HYPERTENSION - Patient has longstanding history of HTN , currently denies any symptoms referable to elevated blood pressure. Specifically denies chest pain, palpitations, dyspnea, orthopnea, PND or peripheral edema. Blood pressure readings have been in normal range. Current medication regimen is as listed  below. Patient denies any side effects of medication.     Chronic short bowel syndrome and gastric outlet obstruction so on lifelong TPN.     Recent bacteremia and new Cm catheter placed.     Review of Systems  Constitutional, neuro, ENT, endocrine, pulmonary, cardiac, gastrointestinal, genitourinary, musculoskeletal, integument and psychiatric systems are negative, except as otherwise noted.    Patient Active Problem List    Diagnosis Date Noted     Short bowel syndrome 01/30/2021     Priority: Medium     Bloodstream infection associated with central venous catheter, initial encounter 01/30/2021     Priority: Medium     Gastric outlet obstruction 10/07/2020     Priority: Medium     Added automatically from request for surgery [id number]       Gram-positive bacteremia 09/29/2019     Priority: Medium     On total parenteral nutrition 09/29/2019     Priority: Medium     Added automatically from request for surgery [id number]       PICC line infiltration, sequela 07/22/2019     Priority: Medium     Infection by Candida species 01/04/2019     Priority: Medium     Bacteremia 10/10/2018     Priority: Medium     Hyperlipidemia LDL goal <130 08/07/2018     Priority: Medium     S/P bariatric surgery 07/30/2018     Priority: Medium     Generalized weakness 01/30/2018     Priority: Medium     Catheter-related bloodstream infection (CRBSI) 09/23/2017     Priority: Medium     Low serum iron 09/08/2017     Priority: Medium     Anemia, iron deficiency 09/08/2017     Priority: Medium     Abnormal echocardiogram 04/11/2017     Priority: Medium     Port or reservoir infection, initial encounter 03/16/2017     Priority: Medium     Status post cervical spinal arthrodesis 02/15/2017     Priority: Medium     Cardiomyopathy in nutritional diseases (H)      Priority: Medium     mild EF ~45% on rest 2/13/17, improves with stressing       Short gut syndrome      Priority: Medium     Anxiety      Priority: Medium     Gastrostomy tube in place  (H) 08/09/2016     Priority: Medium     Chronic nausea 05/10/2016     Priority: Medium     Fungemia 04/11/2016     Priority: Medium     Positive blood culture 03/29/2016     Priority: Medium     Insomnia 08/13/2015     Priority: Medium     Chronic pain 07/07/2015     Priority: Medium     Patient is followed by Dr. Milligan for ongoing prescription of pain medication.  All refills should be approved by this provider, or covering partner.    Medication(s): Fentanyl 50 mcg patches every three days/ Liquid oxycodone 5 mg/5ml up to 50 mg daily.   Maximum quantity per month: as per Dr. Milligan through [facility]  Clinic visit frequency required: Q 3 months at Pain Management    Controlled substance agreement on file: Yes       Date(s): Through Pain Management    Pain Clinic evaluation in the past: Yes       Date(s):  Ongoing every three months       Location(s):  Per pain management    DIRE Total Score(s):  No flowsheet data found.    Last [facility] website verification:  Through Pain Management   [email or web address]    Esteban Daly MD             Health Care Home 02/24/2015     Priority: Medium              Iron deficiency 05/23/2014     Priority: Medium     Vitamin D deficiency 05/22/2014     Priority: Medium     Former smoker 02/24/2014     Priority: Medium     Bile reflux esophagitis 10/16/2013     Priority: Medium     Constipation 10/01/2013     Priority: Medium     Chronic anxiety 09/25/2013     Priority: Medium     Dysphagia 09/17/2013     Priority: Medium     Weight loss, non-intentional 09/17/2013     Priority: Medium     Malnutrition (H) 09/17/2013     Priority: Medium     Dehydration 08/28/2013     Priority: Medium     Vitamin B12 deficiency without anemia 07/31/2013     Priority: Medium     Diagnosis updated by automated process. Provider to review and confirm.       Thiamine deficiency 07/31/2013     Priority: Medium     ADHD (attention deficit hyperactivity disorder), inattentive type  07/02/2013     Priority: Medium     Patient is followed by RICCO SHARP for ongoing prescription of stimulants.  All refills should be approved by this provider, or covering partner.    Medication(s): Adderall.   Maximum quantity per month: 30  Clinic visit frequency required:      Controlled substance agreement on file: No  Neuropsych evaluation for ADD completed:  No    Last [facility] website verification:  done on 5/6/19  [email or web address]         Anemia 09/20/2012     Priority: Medium     Vomiting 06/28/2012     Priority: Medium     Chronic abdominal pain 06/01/2012     Priority: Medium     Patient is followed by ALEXANDRIA WHITLEY for ongoing prescription of narcotic pain medicine.  Med: liquid oxycodone up to 60 mg per day.   Maximum use per month:   Expected duration: ongoing, hope per surgery that will resolve with upcoming surgery  Narcotic agreement on file: YES  Clinic visit recommended: Q 3 months         Peptic ulcer disease 04/08/2010     Priority: Medium     Gastric bypass status for obesity 04/08/2010     Priority: Medium     Top weight of 492.        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     Complication of anesthesia      Difficulty swallowing      Gastric ulcer, unspecified as acute or chronic, without mention of hemorrhage, perforation, or obstruction      Gastro-oesophageal reflux disease      Head injury      Hiatal hernia      Other bladder disorder      Other chronic pain      PONV (postoperative nausea and vomiting)      Severe malnutrition (H)     TPN     Short gut syndrome      Tobacco abuse      Past Surgical History:   Procedure Laterality Date     AMPUTATION       APPENDECTOMY       BACK SURGERY   11/3/2014    curve in the spine     BIOPSY LYMPH NODE CERVICAL N/A 2/20/2015    Procedure: BIOPSY LYMPH NODE CERVICAL;  Surgeon: Baron Scanlon MD;  [facility]     C GASTRIC BYPASS,OBESE<100CM SHAYLEE-EN-Y  2002    lost 300 pounds     CHOLECYSTECTOMY       DISCECTOMY, FUSION CERVICAL ANTERIOR ONE LEVEL, COMBINED N/A 2/15/2017    Procedure: COMBINED DISCECTOMY, FUSION CERVICAL ANTERIOR ONE LEVEL;  Surgeon: Darren Campos MD;  [facility]     ENDOSCOPIC INSERTION TUBE GASTROSTOMY  9/9/2013    Procedure: ENDOSCOPIC INSERTION TUBE GASTROSTOMY;;  Surgeon: Francis Vyas MD;  [facility]     ENDOSCOPIC ULTRASOUND UPPER GASTROINTESTINAL TRACT (GI)  4/29/2011    Procedure:ENDOSCOPIC ULTRASOUND UPPER GASTROINTESTINAL TRACT (GI); Both Procedures done Conjointly; Surgeon:NEREIDA HOUSER; [facility]     ENDOSCOPIC ULTRASOUND UPPER GASTROINTESTINAL TRACT (GI)  9/9/2013    Procedure: ENDOSCOPIC ULTRASOUND UPPER GASTROINTESTINAL TRACT (GI);  Endoscopic Ultrasound Guide Gastrostomy Tube Placement  C-arm;  Surgeon: Noe Lizarraga MD;  [facility]     ENDOSCOPY  03/25/11    EGD[facility]     ENDOSCOPY  08/04/09    Upper Endoscopy, [facility]     ENDOSCOPY  01/05/09    Upper Endoscopy, [facility]     ESOPHAGOSCOPY, GASTROSCOPY, DUODENOSCOPY (EGD), COMBINED  4/20/2011    Procedure:COMBINED ESOPHAGOSCOPY, GASTROSCOPY, DUODENOSCOPY (EGD); Surgeon:FRANCIS VYAS; [facility]     ESOPHAGOSCOPY, GASTROSCOPY, DUODENOSCOPY (EGD), COMBINED  6/15/2011    Procedure:COMBINED ESOPHAGOSCOPY, GASTROSCOPY, DUODENOSCOPY (EGD); Surgeon:FRANCIS VYAS; [facility]     ESOPHAGOSCOPY, GASTROSCOPY, DUODENOSCOPY (EGD), COMBINED  6/12/2013    Procedure: COMBINED ESOPHAGOSCOPY, GASTROSCOPY, DUODENOSCOPY (EGD);;  Surgeon: Francis Vyas MD;  [facility]     ESOPHAGOSCOPY, GASTROSCOPY, DUODENOSCOPY (EGD), COMBINED  11/22/2013    Procedure: COMBINED ESOPHAGOSCOPY,  GASTROSCOPY, DUODENOSCOPY (EGD);;  Surgeon: Francis Vyas MD;  [facility]     ESOPHAGOSCOPY, GASTROSCOPY, DUODENOSCOPY (EGD), COMBINED  4/30/2014    Procedure: COMBINED ESOPHAGOSCOPY, GASTROSCOPY, DUODENOSCOPY (EGD);  Surgeon: Francis Vyas MD;  [facility]     ESOPHAGOSCOPY, GASTROSCOPY, DUODENOSCOPY (EGD), COMBINED N/A 2/20/2015    Procedure: COMBINED ESOPHAGOSCOPY, GASTROSCOPY, DUODENOSCOPY (EGD), BIOPSY SINGLE OR MULTIPLE;  Surgeon: Baron Scanlon MD;  [facility]     ESOPHAGOSCOPY, GASTROSCOPY, DUODENOSCOPY (EGD), COMBINED N/A 9/30/2015    Procedure: COMBINED ESOPHAGOSCOPY, GASTROSCOPY, DUODENOSCOPY (EGD);  Surgeon: Francis Vyas MD;  Location:  GI     ESOPHAGOSCOPY, GASTROSCOPY, DUODENOSCOPY (EGD), COMBINED N/A 10/3/2019    Procedure: Upper Endoscopy;  Surgeon: Clif Morrow MD;  [facility]     GASTRECTOMY  6/22/2012    Procedure: GASTRECTOMY;  Open Approach, Excise Ulcers,Partial Gastrectomy, Esophagojejunostomy, Hiatal Hernia Repair, Extensive Lysis of Adhesions and Esaphagogastrodudenoscopy.;  Surgeon: Francis Vyas MD;  [facility]     GASTROJEJUNOSTOMY  08/26/09    Extensice enterolysis, partial resect. jejunum, part. resect gastric pouch, gastrojejunostomy anastomosis     HC ESOPH/GAS REFLUX TEST W NASAL IMPED ELECTRODE  8/5/2013    Procedure: ESOPHAGEAL IMPEDENCE FUNCTION TEST 1 HOUR OR LESS;  Surgeon: Halie Lang MD;  [facility]     HEAD & NECK SURGERY  2/15/2017    C5-C6     HERNIA REPAIR  2006    Umbilical hernia     HERNIORRHAPHY HIATAL  6/22/2012    Procedure: HERNIORRHAPHY HIATAL;;  Surgeon: Francis Vyas MD;  [facility]     HERNIORRHAPHY INGUINAL  11/22/2013    Procedure: HERNIORRHAPHY INGUINAL;;  Surgeon: Francis Vyas MD;  [facility]     INSERT PICC LINE Right 12/19/2019    Procedure: Picc Placement;  Surgeon: Per Dumont PA-C;  [facility]     INSERT PICC LINE Right 2/21/2020     Procedure: INSERTION, PICC;  Surgeon: Per Dumont PA-C;  [facility]     INSERT PORT VASCULAR ACCESS Right 12/19/2017    Procedure: INSERT PORT VASCULAR ACCESS;  Right Chest Port Placement ;  Surgeon: Lisandro Alejandro PA-C;  [facility]     INSERT PORT VASCULAR ACCESS Right 8/2/2018    Procedure: INSERT PORT VASCULAR ACCESS;  Place single lumen tunneled central venous access catheter;  Surgeon: Guy Jamil PA-C;  [facility]     IR CVC TUNNEL PLACEMENT > 5 YRS OF AGE  8/7/2019     IR CVC TUNNEL PLACEMENT > 5 YRS OF AGE  4/14/2020     IR CVC TUNNEL PLACEMENT > 5 YRS OF AGE  8/3/2020     IR CVC TUNNEL PLACEMENT > 5 YRS OF AGE  9/4/2020     IR CVC TUNNEL PLACEMENT > 5 YRS OF AGE  2/5/2021     IR CVC TUNNEL REMOVAL RIGHT  10/1/2019     IR CVC TUNNEL REMOVAL RIGHT  7/30/2020     IR CVC TUNNEL REMOVAL RIGHT  9/2/2020     IR CVC TUNNEL REMOVAL RIGHT  2/3/2021     IR FOLLOW UP VISIT OUTPATIENT  8/7/2019     IR PICC PLACEMENT > 5 YRS OF AGE  3/7/2019     IR PICC PLACEMENT > 5 YRS OF AGE  12/19/2019     IR PICC PLACEMENT > 5 YRS OF AGE  2/21/2020     LAPAROTOMY EXPLORATORY  11/22/2013    Procedure: LAPAROTOMY EXPLORATORY;  Exploratory Laparotomy, Upper Endoscopy, Left Inguinal Hernia Repair;  Surgeon: Francis Vyas MD;  [facility]     ORTHOPEDIC SURGERY       PICC INSERTION Right 03/16/2017    5fr DL BioFlo PICC, 42cm (3cm external) in the R medial brachial vein w/ tip in the SVC RA junction.     PICC INSERTION Left 09/23/2017    5fr DL BioFlo PICC, 45cm (1cm external) in the L basilic vein w/ tip in the SVC RA junction.     PICC INSERTION Right 05/16/2019    5Fr - 43cm, Medial brachial vein, low SVC     PICC INSERTION Right 10/02/2019    5Fr - 43cm (2cm external), basilic vein, low SVC     SHAYLEE EN Y BOWEL  2003     SOFT TISSUE SURGERY       THORACIC SURGERY       TONSILLECTOMY       TRANSESOPHAGEAL ECHOCARDIOGRAM INTRAOPERATIVE N/A 1/8/2019    Procedure: TRANSESOPHAGEAL  "ECHOCARDIOGRAM INTRAOPERATIVE;  Surgeon: GENERIC ANESTHESIA PROVIDER;  [facility]     Current Outpatient Medications   Medication Sig Dispense Refill     acetaminophen (TYLENOL) 32 mg/mL liquid Take 15.65 mLs (500 mg) by mouth every 4 hours as needed for fever or mild pain 455 mL 1     alteplase 2 mg/2 mL (in 10 mL syringe) Inject 1 mg into the vein as needed       amphetamine-dextroamphetamine (ADDERALL) 20 MG tablet TAKE ONE TABLET BY MOUTH ONCE DAILY 30 tablet 0     carvedilol (COREG) 6.25 MG tablet Take 6.25 mg by mouth 2 times daily (with meals)       cyanocobalamin (CYANOCOBALAMIN) 1000 MCG/ML injection Inject 1 mL (1,000 mcg) into the muscle every 30 days 1 mL 11     [facility] INFUSION MANAGED PATIENT Contact [facility] for patient specific medication information at [phone] on admission and discharge from the hospital.  Phones are answered 24 hours a day 7 days a week 365 days a year.    Providers - Choose \"CONTINUE HOME MED (no script)\" at discharge if patient treatment with home infusion will continue.       fentaNYL (DURAGESIC) 25 mcg/hr 72 hr patch Place 1 patch onto the skin every 72 hours remove old patch.  0     Lidocaine (LIDOCARE) 4 % Patch Place 1 patch onto the skin every 24 hours To prevent lidocaine toxicity, patient should be patch free for 12 hrs daily. 30 patch 0     lidocaine 7.5 mL, alum & mag hydroxide-simethicone 7.5 mL GI Cocktail Take 15 mLs by mouth once as needed for mouth, throat or stomach pain 1000 mL 1     LORazepam (ATIVAN) 1 MG tablet TAKE 1 TABLET (1MG) BY MOUTH AS NEEDED FOR ANXIETY WITH TPN AND MEDICATIONS . JUST ONCE A DAY (30 TO LAST 30 DAYS) 30 tablet 0     multivitamin (CENTRUM SILVER) tablet Take 1 tablet by mouth daily       nystatin (MYCOSTATIN) 910045 UNIT/GM external cream Apply topically 2 times daily 30 g 0     ondansetron (ZOFRAN-ODT) 8 MG ODT tab DISSOLVE ONE TABLET ON TONGUE EVERY 8 HOURS AS NEEDED FOR NAUSEA 90 tablet 1     oxyCODONE " (ROXICODONE) 5 MG/5ML solution Take 5-10 mLs (5-10 mg) by mouth every 4 hours as needed for severe pain  0     pantoprazole (PROTONIX) 40 MG EC tablet Take 1 tablet (40 mg) by mouth daily 30 tablet 5     parenteral nutrition - PTA/DISCHARGE ORDER Patient receives 2050 mL TPN every 14 hours through PICC at [facility].       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 Patient reported tolerating amoxicillin.     Doxycycline Rash     Vancomycin Rash     Rash after receiving vancomycin 3/28/16 ([facility]?). Tolerated with slower infusion and diphenhydramine premed.        Social History     Tobacco Use     Smoking status: Current Some Day Smoker     Types: Cigarettes     Smokeless tobacco: Never Used     Tobacco comment: 2/4/2021    smokes 3 cigarettes/day   Substance Use Topics     Alcohol use: No     Frequency:  Never     Drinks per session: Patient refused     Binge frequency: Never     Comment: quit 2002       History   Drug Use No         Objective     /72   Pulse 96   Temp 98.4  F (36.9  C) (Temporal)   Resp 16   Wt 84.4 kg (186 lb)   SpO2 99%   BMI 25.58 kg/m      Physical Exam    GENERAL APPEARANCE: healthy, alert and no distress     EYES: EOMI,  PERRL     HENT: ear canals and TM's normal and nose and mouth without ulcers or lesions     NECK: no adenopathy, no asymmetry, masses, or scars and thyroid normal to palpation     RESP: lungs clear to auscultation - no rales, rhonchi or wheezes  Hick cath on right chest anterior     CV: regular rates and rhythm, normal S1 S2, no S3 or S4 and no murmur, click or rub     MS: trace edema in his legs.      SKIN: no suspicious lesions or rashes     NEURO: Normal strength and tone, sensory exam grossly normal, mentation intact and speech normal     PSYCH: mentation appears normal. and affect normal/bright     LYMPHATICS: No cervical adenopathy    Recent Labs   Lab Test 02/15/21  1240 02/08/21  1445 01/29/21  2312 01/29/21  2312 09/04/20  0459 09/04/20  0459   HGB 11.0* 10.9*   < > 12.1*   < > 11.5*   * 151   < > 239   < > 182   INR  --   --   --  1.04  --  1.03    143   < > 140   < > 138   POTASSIUM 4.0 3.4   < > 3.8   < > 3.6   CR 0.77 0.60*   < > 0.67   < > 0.83    < > = values in this interval not displayed.        Diagnostics:  Recent Results (from the past 240 hour(s))   CBC with platelets differential    Collection Time: 02/15/21 12:40 PM   Result Value Ref Range    WBC 5.5 4.0 - 11.0 10e9/L    RBC Count 3.74 (L) 4.4 - 5.9 10e12/L    Hemoglobin 11.0 (L) 13.3 - 17.7 g/dL    Hematocrit 35.0 (L) 40.0 - 53.0 %    MCV 94 78 - 100 fl    MCH 29.4 26.5 - 33.0 pg    MCHC 31.4 (L) 31.5 - 36.5 g/dL    RDW 18.4 (H) 10.0 - 15.0 %    Platelet Count 143 (L) 150 - 450 10e9/L    Diff Method Automated Method     % Neutrophils 46.3 %    % Lymphocytes 32.6 %    %  Monocytes 14.7 %    % Eosinophils 5.3 %    % Basophils 1.1 %    % Immature Granulocytes 0.0 %    Nucleated RBCs 0 0 /100    Absolute Neutrophil 2.5 1.6 - 8.3 10e9/L    Absolute Lymphocytes 1.8 0.8 - 5.3 10e9/L    Absolute Monocytes 0.8 0.0 - 1.3 10e9/L    Absolute Basophils 0.1 0.0 - 0.2 10e9/L    Abs Immature Granulocytes 0.0 0 - 0.4 10e9/L    Absolute Nucleated RBC 0.0    Comprehensive metabolic panel    Collection Time: 02/15/21 12:40 PM   Result Value Ref Range    Sodium 144 133 - 144 mmol/L    Potassium 4.0 3.4 - 5.3 mmol/L    Chloride 111 (H) 94 - 109 mmol/L    Carbon Dioxide 30 20 - 32 mmol/L    Anion Gap 3 3 - 14 mmol/L    Glucose 87 70 - 99 mg/dL    Urea Nitrogen 16 7 - 30 mg/dL    Creatinine 0.77 0.66 - 1.25 mg/dL    GFR Estimate >90 >60 mL/min/[1.73_m2]    GFR Estimate If Black >90 >60 mL/min/[1.73_m2]    Calcium 8.3 (L) 8.5 - 10.1 mg/dL    Bilirubin Total 0.4 0.2 - 1.3 mg/dL    Albumin 3.5 3.4 - 5.0 g/dL    Protein Total 6.6 (L) 6.8 - 8.8 g/dL    Alkaline Phosphatase 67 40 - 150 U/L    ALT 23 0 - 70 U/L    AST 15 0 - 45 U/L   Bilirubin direct    Collection Time: 02/15/21 12:40 PM   Result Value Ref Range    Bilirubin Direct 0.1 0.0 - 0.2 mg/dL   Magnesium    Collection Time: 02/15/21 12:40 PM   Result Value Ref Range    Magnesium 2.0 1.6 - 2.3 mg/dL   Phosphorus    Collection Time: 02/15/21 12:40 PM   Result Value Ref Range    Phosphorus 4.2 2.5 - 4.5 mg/dL   Triglycerides    Collection Time: 02/15/21 12:40 PM   Result Value Ref Range    Triglycerides 68 <150 mg/dL   Asymptomatic COVID-19 Virus (Coronavirus) by PCR    Collection Time: 02/20/21 10:58 AM    Specimen: Nasopharyngeal   Result Value Ref Range    COVID-19 Virus PCR to [facility] - Source Nasopharyngeal     COVID-19 Virus PCR to [facility] - Result       Test received-See reflex to IDDL test SARS CoV2 (COVID-19) Virus RT-PCR   SARS-CoV-2 COVID-19 Virus (Coronavirus) by PCR    Collection Time: 02/20/21 10:58 AM    Specimen: Nasopharyngeal   Result  Value Ref Range    SARS-CoV-2 Virus Specimen Source Nasopharyngeal     SARS-CoV-2 PCR Result NEGATIVE     SARS-CoV-2 PCR Comment       Testing was performed using the [employer] SARS-CoV-2 Assay on the [employer] Instrument System.   Additional information about this Emergency Use Authorization (EUA) assay can be found via   the Lab Guide.        No EKG required, no history of coronary heart disease, significant arrhythmia, peripheral arterial disease or other structural heart disease.    Revised Cardiac Risk Index (RCRI):  The patient has the following serious cardiovascular risks for perioperative complications:   - No serious cardiac risks = 0 points     RCRI Interpretation: 1 point: Class II (low risk - 0.9% complication rate)             Signed Electronically by: Chuy Isaac MD  Copy of this evaluation report is provided to requesting physician.    [facility] Guidelines    Revised Cardiac Risk Index

## 2021-02-22 NOTE — PATIENT INSTRUCTIONS

## 2021-02-23 ENCOUNTER — ANESTHESIA EVENT (OUTPATIENT)
Dept: SURGERY | Facility: CLINIC | Age: 51
End: 2021-02-23
Payer: COMMERCIAL

## 2021-02-23 ASSESSMENT — LIFESTYLE VARIABLES: TOBACCO_USE: 1

## 2021-02-23 NOTE — ANESTHESIA PREPROCEDURE EVALUATION
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     Complication of anesthesia      Difficulty swallowing      Gastric ulcer, unspecified as acute or chronic, without mention of hemorrhage, perforation, or obstruction      Gastro-oesophageal reflux disease      Head injury      Hiatal hernia      Other bladder disorder      Other chronic pain      PONV (postoperative nausea and vomiting)      Severe malnutrition (H)     TPN     Short gut syndrome      Tobacco abuse       Past Surgical History:   Procedure Laterality Date     AMPUTATION       APPENDECTOMY       BACK SURGERY  11/3/2014    curve in the spine     BIOPSY LYMPH NODE CERVICAL N/A 2015    Procedure: BIOPSY LYMPH NODE CERVICAL;  Surgeon: Baron Scanlon MD;  Location: [facility]     C GASTRIC BYPASS,OBESE<100CM SHAYLEE-EN-Y  2002    lost 300 pounds     CHOLECYSTECTOMY       DISCECTOMY, FUSION CERVICAL ANTERIOR ONE LEVEL, COMBINED N/A 2/15/2017    Procedure: COMBINED DISCECTOMY, FUSION CERVICAL ANTERIOR ONE LEVEL;  Surgeon: Darren Campos MD;  [facility]     ENDOSCOPIC INSERTION TUBE GASTROSTOMY  2013    Procedure: ENDOSCOPIC INSERTION TUBE GASTROSTOMY;;  Surgeon: Francis Vyas MD;  [facility]     ENDOSCOPIC ULTRASOUND UPPER GASTROINTESTINAL TRACT (GI)  2011    Procedure:ENDOSCOPIC ULTRASOUND UPPER  GASTROINTESTINAL TRACT (GI); Both Procedures done Conjointly; Surgeon:NEREIDA HOUSER; [facility]     ENDOSCOPIC ULTRASOUND UPPER GASTROINTESTINAL TRACT (GI)  9/9/2013    Procedure: ENDOSCOPIC ULTRASOUND UPPER GASTROINTESTINAL TRACT (GI);  Endoscopic Ultrasound Guide Gastrostomy Tube Placement  C-arm;  Surgeon: Noe Lizarraga MD;  [facility]     ENDOSCOPY  03/25/11    EGD, [facility]     ENDOSCOPY  08/04/09    Upper Endoscopy, [facility]     ENDOSCOPY  01/05/09    Upper Endoscopy, [facility]     ESOPHAGOSCOPY, GASTROSCOPY, DUODENOSCOPY (EGD), COMBINED  4/20/2011    Procedure:COMBINED ESOPHAGOSCOPY, GASTROSCOPY, DUODENOSCOPY (EGD); Surgeon:FRANCIS VYAS; [facility]     ESOPHAGOSCOPY, GASTROSCOPY, DUODENOSCOPY (EGD), COMBINED  6/15/2011    Procedure:COMBINED ESOPHAGOSCOPY, GASTROSCOPY, DUODENOSCOPY (EGD); Surgeon:FRANCIS VYAS; [facility]     ESOPHAGOSCOPY, GASTROSCOPY, DUODENOSCOPY (EGD), COMBINED  6/12/2013    Procedure: COMBINED ESOPHAGOSCOPY, GASTROSCOPY, DUODENOSCOPY (EGD);;  Surgeon: Francis Vyas MD;  [facility]     ESOPHAGOSCOPY, GASTROSCOPY, DUODENOSCOPY (EGD), COMBINED  11/22/2013    Procedure: COMBINED ESOPHAGOSCOPY, GASTROSCOPY, DUODENOSCOPY (EGD);;  Surgeon: Francis Vyas MD;  [facility]     ESOPHAGOSCOPY, GASTROSCOPY, DUODENOSCOPY (EGD), COMBINED  4/30/2014    Procedure: COMBINED ESOPHAGOSCOPY, GASTROSCOPY, DUODENOSCOPY (EGD);  Surgeon: Francis Vyas MD;  [facility]     ESOPHAGOSCOPY, GASTROSCOPY, DUODENOSCOPY (EGD), COMBINED N/A 2/20/2015    Procedure: COMBINED ESOPHAGOSCOPY, GASTROSCOPY, DUODENOSCOPY (EGD), BIOPSY SINGLE OR MULTIPLE;  Surgeon: Baron Scanlon MD;  Location:  OR     ESOPHAGOSCOPY, GASTROSCOPY, DUODENOSCOPY (EGD), COMBINED N/A 9/30/2015    Procedure: COMBINED ESOPHAGOSCOPY, GASTROSCOPY, DUODENOSCOPY (EGD);  Surgeon: Francis Vyas MD;  Location:  GI     ESOPHAGOSCOPY,  GASTROSCOPY, DUODENOSCOPY (EGD), COMBINED N/A 10/3/2019    Procedure: Upper Endoscopy;  Surgeon: Clif Morrow MD;  [facility]     GASTRECTOMY  6/22/2012    Procedure: GASTRECTOMY;  Open Approach, Excise Ulcers,Partial Gastrectomy, Esophagojejunostomy, Hiatal Hernia Repair, Extensive Lysis of Adhesions and Esaphagogastrodudenoscopy.;  Surgeon: Francis Vyas MD;  [facility]     GASTROJEJUNOSTOMY  08/26/09    Extensice enterolysis, partial resect. jejunum, part. resect gastric pouch, gastrojejunostomy anastomosis     HC ESOPH/GAS REFLUX TEST W NASAL IMPED ELECTRODE  8/5/2013    Procedure: ESOPHAGEAL IMPEDENCE FUNCTION TEST 1 HOUR OR LESS;  Surgeon: Halie Lang MD;  [facility]     HEAD & NECK SURGERY  2/15/2017    C5-C6     HERNIA REPAIR  2006    Umbilical hernia     HERNIORRHAPHY HIATAL  6/22/2012    Procedure: HERNIORRHAPHY HIATAL;;  Surgeon: Francis Vyas MD;  [facility]     HERNIORRHAPHY INGUINAL  11/22/2013    Procedure: HERNIORRHAPHY INGUINAL;;  Surgeon: Francis Vyas MD;  [facility]     INSERT PICC LINE Right 12/19/2019    Procedure: Picc Placement;  Surgeon: Per Dumont PA-C;  [facility]     INSERT PICC LINE Right 2/21/2020    Procedure: INSERTION, PICC;  Surgeon: Per Dumont PA-C;  [facility]     INSERT PORT VASCULAR ACCESS Right 12/19/2017    Procedure: INSERT PORT VASCULAR ACCESS;  Right Chest Port Placement ;  Surgeon: Lisandro Alejandro PA-C;  [facility]     INSERT PORT VASCULAR ACCESS Right 8/2/2018    Procedure: INSERT PORT VASCULAR ACCESS;  Place single lumen tunneled central venous access catheter;  Surgeon: Guy Jamil PA-C;  [facility]     IR CVC TUNNEL PLACEMENT > 5 YRS OF AGE  8/7/2019     IR CVC TUNNEL PLACEMENT > 5 YRS OF AGE  4/14/2020     IR CVC TUNNEL PLACEMENT > 5 YRS OF AGE  8/3/2020     IR CVC TUNNEL PLACEMENT > 5 YRS OF AGE  9/4/2020     IR CVC TUNNEL PLACEMENT > 5 YRS OF  AGE  2/5/2021     IR CVC TUNNEL REMOVAL RIGHT  10/1/2019     IR CVC TUNNEL REMOVAL RIGHT  7/30/2020     IR CVC TUNNEL REMOVAL RIGHT  9/2/2020     IR CVC TUNNEL REMOVAL RIGHT  2/3/2021     IR FOLLOW UP VISIT OUTPATIENT  8/7/2019     IR PICC PLACEMENT > 5 YRS OF AGE  3/7/2019     IR PICC PLACEMENT > 5 YRS OF AGE  12/19/2019     IR PICC PLACEMENT > 5 YRS OF AGE  2/21/2020     LAPAROTOMY EXPLORATORY  11/22/2013    Procedure: LAPAROTOMY EXPLORATORY;  Exploratory Laparotomy, Upper Endoscopy, Left Inguinal Hernia Repair;  Surgeon: Francsi Vyas MD;  [facility]     ORTHOPEDIC SURGERY       PICC INSERTION Right 03/16/2017    5fr DL BioFlo PICC, 42cm (3cm external) in the R medial brachial vein w/ tip in the SVC RA junction.     PICC INSERTION Left 09/23/2017    5fr DL BioFlo PICC, 45cm (1cm external) in the L basilic vein w/ tip in the SVC RA junction.     PICC INSERTION Right 05/16/2019    5Fr - 43cm, Medial brachial vein, low SVC     PICC INSERTION Right 10/02/2019    5Fr - 43cm (2cm external), basilic vein, low SVC     SHAYLEE EN Y BOWEL  2003     SOFT TISSUE SURGERY       THORACIC SURGERY       TONSILLECTOMY       TRANSESOPHAGEAL ECHOCARDIOGRAM INTRAOPERATIVE N/A 1/8/2019    Procedure: TRANSESOPHAGEAL ECHOCARDIOGRAM INTRAOPERATIVE;  Surgeon: GENERIC ANESTHESIA PROVIDER;  [facility]      Allergies   Allergen Reactions     Bactrim [Sulfamethoxazole W/Trimethoprim] Rash     Penicillins Anaphylaxis     Please see Antimicrobial Management Team allergy assessment note 10/10/2018. Patient reported tolerating amoxicillin.     Doxycycline Rash     Vancomycin Rash     Rash after receiving vancomycin 3/28/16 (red man's?). Tolerated with slower infusion and diphenhydramine premed.      Social History     Tobacco Use     Smoking status: Current Some Day Smoker     Types: Cigarettes     Smokeless tobacco: Never Used     Tobacco comment: 2/4/2021    smokes 3 cigarettes/day   Substance Use Topics     Alcohol use: No      Frequency: Never     Drinks per session: Patient refused     Binge frequency: Never     Comment: quit 2002      Wt Readings from Last 1 Encounters:   02/22/21 84.4 kg (186 lb)        Anesthesia Evaluation   Pt has had prior anesthetic. Type: General.    History of anesthetic complications  - PONV.      ROS/MED HX  ENT/Pulmonary:     (+) tobacco use, Current use,     Neurologic:  - neg neurologic ROS     Cardiovascular:     (+) hypertension-----Previous cardiac testing   Echo: Date: 2/2/21 Results:  No evidence of vegetation on the aortic, tricuspid and mitral valve, the pulmonic valve was not well visualized. Global and regional left ventricular function is normal with an EF of 55-60%. The right ventricle is normal size. Global right ventricular function is normal.  Stress Test: Date: Results:    ECG Reviewed: Date: 10/2/19 Results:  NSR  Cath: Date: Results:      METS/Exercise Tolerance:  Comment: H/o cardiomyopathy in nutritional disease     Hematologic:     (+) anemia,     Musculoskeletal: Comment: Cervical arthrodesis s/p C5-C6 fusion      GI/Hepatic: Comment: S/p gastric bypass c/b short gut syndrome on lifelong TPN  Gastric outlet obstruction    (+) GERD, Symptomatic, hiatal hernia,     Renal/Genitourinary:       Endo:       Psychiatric/Substance Use:     (+) psychiatric history anxiety (ADHD)     Infectious Disease: Comment: Recent Bacteremia associated with central line      Malignancy:       Other:      (+) , H/O Chronic Pain,        Physical Exam    Airway        Mallampati: I   TM distance: > 3 FB   Neck ROM: full   Mouth opening: > 3 cm    Respiratory Devices and Support         Dental       (+) lower dentures and upper dentures      Cardiovascular          Rhythm and rate: regular and normal     Pulmonary   pulmonary exam normal        breath sounds clear to auscultation           OUTSIDE LABS:  CBC:   Lab Results   Component Value Date    WBC 5.5 02/15/2021    WBC 6.2 02/08/2021    HGB 11.0 (L)  02/15/2021    HGB 10.9 (L) 02/08/2021    HCT 35.0 (L) 02/15/2021    HCT 34.2 (L) 02/08/2021     (L) 02/15/2021     02/08/2021     BMP:   Lab Results   Component Value Date     02/15/2021     02/08/2021    POTASSIUM 4.0 02/15/2021    POTASSIUM 3.4 02/08/2021    CHLORIDE 111 (H) 02/15/2021    CHLORIDE 112 (H) 02/08/2021    CO2 30 02/15/2021    CO2 27 02/08/2021    BUN 16 02/15/2021    BUN 14 02/08/2021    CR 0.77 02/15/2021    CR 0.60 (L) 02/08/2021    GLC 87 02/15/2021    GLC 87 02/08/2021     COAGS:   Lab Results   Component Value Date    PTT 26 07/22/2019    INR 1.04 01/29/2021    FIBR 324 10/25/2016     POC:   Lab Results   Component Value Date     (H) 08/03/2020     HEPATIC:   Lab Results   Component Value Date    ALBUMIN 3.5 02/15/2021    PROTTOTAL 6.6 (L) 02/15/2021    ALT 23 02/15/2021    AST 15 02/15/2021    ALKPHOS 67 02/15/2021    BILITOTAL 0.4 02/15/2021     OTHER:   Lab Results   Component Value Date    LACT 1.4 08/31/2020    A1C 4.9 07/23/2013    SILAS 8.3 (L) 02/15/2021    PHOS 4.2 02/15/2021    MAG 2.0 02/15/2021    LIPASE 58 (L) 09/29/2019    AMYLASE 178 (H) 06/28/2012    TSH 1.16 05/12/2019    CRP <2.9 01/29/2021    SED 10 07/28/2020       Anesthesia Plan    ASA Status:  3      Anesthesia Type: General.     - Airway: ETT   Induction: Intravenous, RSI.   Maintenance: TIVA.        Consents    Anesthesia Plan(s) and associated risks, benefits, and realistic alternatives discussed. Questions answered and patient/representative(s) expressed understanding.     - Discussed with:  Patient         Postoperative Care    Pain management: IV analgesics, Multi-modal analgesia.   PONV prophylaxis: Ondansetron (or other 5HT-3), Dexamethasone or Solumedrol, Background Propofol Infusion     Comments:    ______________________________________________________________________  Pt with PONV with inhaled anesthestics - will plan for TIVA.  I discussed the risks and benefits of general  anesthesia with the patient.  Questions were sought and answered.      Hector Diggs MD  Attending Anesthesiologist              Jenny Arroyo MD

## 2021-02-24 ENCOUNTER — HOSPITAL ENCOUNTER (OUTPATIENT)
Facility: CLINIC | Age: 51
Discharge: HOME | End: 2021-02-24
Attending: INTERNAL MEDICINE | Admitting: INTERNAL MEDICINE
Payer: COMMERCIAL

## 2021-02-24 ENCOUNTER — ANESTHESIA (OUTPATIENT)
Dept: SURGERY | Facility: CLINIC | Age: 51
End: 2021-02-24
Payer: COMMERCIAL

## 2021-02-24 ENCOUNTER — APPOINTMENT (OUTPATIENT)
Dept: GENERAL RADIOLOGY | Facility: CLINIC | Age: 51
End: 2021-02-24
Attending: INTERNAL MEDICINE
Payer: COMMERCIAL

## 2021-02-24 VITALS
SYSTOLIC BLOOD PRESSURE: 126 MMHG | TEMPERATURE: 97 F | HEIGHT: 71 IN | RESPIRATION RATE: 16 BRPM | WEIGHT: 188.71 LBS | HEART RATE: 75 BPM | BODY MASS INDEX: 26.42 KG/M2 | DIASTOLIC BLOOD PRESSURE: 86 MMHG | OXYGEN SATURATION: 100 %

## 2021-02-24 DIAGNOSIS — Z98.1 STATUS POST CERVICAL SPINAL ARTHRODESIS: ICD-10-CM

## 2021-02-24 DIAGNOSIS — K31.1 GASTRIC OUTLET OBSTRUCTION: ICD-10-CM

## 2021-02-24 LAB
CREAT SERPL-MCNC: 0.7 MG/DL (ref 0.66–1.25)
ERYTHROCYTE [DISTWIDTH] IN BLOOD BY AUTOMATED COUNT: 17.6 % (ref 10–15)
GFR SERPL CREATININE-BSD FRML MDRD: >90 ML/MIN/{1.73_M2}
GLUCOSE BLDC GLUCOMTR-MCNC: 91 MG/DL (ref 70–99)
HCT VFR BLD AUTO: 39.1 % (ref 40–53)
HGB BLD-MCNC: 12.4 G/DL (ref 13.3–17.7)
INR PPP: 1.11 (ref 0.86–1.14)
MCH RBC QN AUTO: 30.4 PG (ref 26.5–33)
MCHC RBC AUTO-ENTMCNC: 31.7 G/DL (ref 31.5–36.5)
MCV RBC AUTO: 96 FL (ref 78–100)
PLATELET # BLD AUTO: 160 10E9/L (ref 150–450)
POTASSIUM SERPL-SCNC: 3.6 MMOL/L (ref 3.4–5.3)
RBC # BLD AUTO: 4.08 10E12/L (ref 4.4–5.9)
UPPER EUS: NORMAL
WBC # BLD AUTO: 8.4 10E9/L (ref 4–11)

## 2021-02-24 PROCEDURE — 82565 ASSAY OF CREATININE: CPT | Performed by: STUDENT IN AN ORGANIZED HEALTH CARE EDUCATION/TRAINING PROGRAM

## 2021-02-24 PROCEDURE — 710N000010 HC RECOVERY PHASE 1, LEVEL 2, PER MIN: Performed by: INTERNAL MEDICINE

## 2021-02-24 PROCEDURE — 370N000017 HC ANESTHESIA TECHNICAL FEE, PER MIN: Performed by: INTERNAL MEDICINE

## 2021-02-24 PROCEDURE — 250N000011 HC RX IP 250 OP 636: Performed by: STUDENT IN AN ORGANIZED HEALTH CARE EDUCATION/TRAINING PROGRAM

## 2021-02-24 PROCEDURE — 250N000011 HC RX IP 250 OP 636: Performed by: ANESTHESIOLOGY

## 2021-02-24 PROCEDURE — 82962 GLUCOSE BLOOD TEST: CPT

## 2021-02-24 PROCEDURE — 250N000009 HC RX 250: Performed by: STUDENT IN AN ORGANIZED HEALTH CARE EDUCATION/TRAINING PROGRAM

## 2021-02-24 PROCEDURE — 85610 PROTHROMBIN TIME: CPT | Performed by: STUDENT IN AN ORGANIZED HEALTH CARE EDUCATION/TRAINING PROGRAM

## 2021-02-24 PROCEDURE — 999N000179 XR SURGERY CARM FLUORO LESS THAN 5 MIN W STILLS: Mod: TC

## 2021-02-24 PROCEDURE — 84132 ASSAY OF SERUM POTASSIUM: CPT | Performed by: STUDENT IN AN ORGANIZED HEALTH CARE EDUCATION/TRAINING PROGRAM

## 2021-02-24 PROCEDURE — 258N000003 HC RX IP 258 OP 636: Performed by: STUDENT IN AN ORGANIZED HEALTH CARE EDUCATION/TRAINING PROGRAM

## 2021-02-24 PROCEDURE — C1769 GUIDE WIRE: HCPCS | Performed by: INTERNAL MEDICINE

## 2021-02-24 PROCEDURE — 360N000082 HC SURGERY LEVEL 2 W/ FLUORO, PER MIN: Performed by: INTERNAL MEDICINE

## 2021-02-24 PROCEDURE — C1894 INTRO/SHEATH, NON-LASER: HCPCS | Performed by: INTERNAL MEDICINE

## 2021-02-24 PROCEDURE — 36415 COLL VENOUS BLD VENIPUNCTURE: CPT | Performed by: STUDENT IN AN ORGANIZED HEALTH CARE EDUCATION/TRAINING PROGRAM

## 2021-02-24 PROCEDURE — 710N000012 HC RECOVERY PHASE 2, PER MINUTE: Performed by: INTERNAL MEDICINE

## 2021-02-24 PROCEDURE — 999N000141 HC STATISTIC PRE-PROCEDURE NURSING ASSESSMENT: Performed by: INTERNAL MEDICINE

## 2021-02-24 PROCEDURE — 250N000009 HC RX 250: Performed by: INTERNAL MEDICINE

## 2021-02-24 PROCEDURE — 272N000001 HC OR GENERAL SUPPLY STERILE: Performed by: INTERNAL MEDICINE

## 2021-02-24 PROCEDURE — 85027 COMPLETE CBC AUTOMATED: CPT | Performed by: STUDENT IN AN ORGANIZED HEALTH CARE EDUCATION/TRAINING PROGRAM

## 2021-02-24 RX ORDER — LIDOCAINE 40 MG/G
CREAM TOPICAL
Status: DISCONTINUED | OUTPATIENT
Start: 2021-02-24 | End: 2021-02-24 | Stop reason: HOSPADM

## 2021-02-24 RX ORDER — SODIUM CHLORIDE, SODIUM LACTATE, POTASSIUM CHLORIDE, CALCIUM CHLORIDE 600; 310; 30; 20 MG/100ML; MG/100ML; MG/100ML; MG/100ML
INJECTION, SOLUTION INTRAVENOUS CONTINUOUS
Status: DISCONTINUED | OUTPATIENT
Start: 2021-02-24 | End: 2021-02-24 | Stop reason: HOSPADM

## 2021-02-24 RX ORDER — HEPARIN SODIUM,PORCINE 10 UNIT/ML
5-10 VIAL (ML) INTRAVENOUS
Status: DISCONTINUED | OUTPATIENT
Start: 2021-02-24 | End: 2021-02-24 | Stop reason: HOSPADM

## 2021-02-24 RX ORDER — FLUMAZENIL 0.1 MG/ML
0.2 INJECTION, SOLUTION INTRAVENOUS
Status: DISCONTINUED | OUTPATIENT
Start: 2021-02-24 | End: 2021-02-24 | Stop reason: HOSPADM

## 2021-02-24 RX ORDER — HEPARIN SODIUM,PORCINE 10 UNIT/ML
5-10 VIAL (ML) INTRAVENOUS EVERY 24 HOURS
Status: DISCONTINUED | OUTPATIENT
Start: 2021-02-24 | End: 2021-02-24 | Stop reason: HOSPADM

## 2021-02-24 RX ORDER — NALOXONE HYDROCHLORIDE 0.4 MG/ML
0.2 INJECTION, SOLUTION INTRAMUSCULAR; INTRAVENOUS; SUBCUTANEOUS
Status: DISCONTINUED | OUTPATIENT
Start: 2021-02-24 | End: 2021-02-24 | Stop reason: HOSPADM

## 2021-02-24 RX ORDER — DEXAMETHASONE SODIUM PHOSPHATE 4 MG/ML
INJECTION, SOLUTION INTRA-ARTICULAR; INTRALESIONAL; INTRAMUSCULAR; INTRAVENOUS; SOFT TISSUE PRN
Status: DISCONTINUED | OUTPATIENT
Start: 2021-02-24 | End: 2021-02-24

## 2021-02-24 RX ORDER — EPINEPHRINE 0.3 MG/.3ML
INJECTION SUBCUTANEOUS
Status: ON HOLD | COMMUNITY
Start: 2021-01-15 | End: 2023-07-06

## 2021-02-24 RX ORDER — NALOXONE HYDROCHLORIDE 0.4 MG/ML
0.4 INJECTION, SOLUTION INTRAMUSCULAR; INTRAVENOUS; SUBCUTANEOUS
Status: DISCONTINUED | OUTPATIENT
Start: 2021-02-24 | End: 2021-02-24

## 2021-02-24 RX ORDER — ONDANSETRON 4 MG/1
4 TABLET, ORALLY DISINTEGRATING ORAL EVERY 30 MIN PRN
Status: DISCONTINUED | OUTPATIENT
Start: 2021-02-24 | End: 2021-02-24 | Stop reason: HOSPADM

## 2021-02-24 RX ORDER — HYDROMORPHONE HYDROCHLORIDE 1 MG/ML
.3-.5 INJECTION, SOLUTION INTRAMUSCULAR; INTRAVENOUS; SUBCUTANEOUS ONCE
Status: COMPLETED | OUTPATIENT
Start: 2021-02-24 | End: 2021-02-24

## 2021-02-24 RX ORDER — MEPERIDINE HYDROCHLORIDE 25 MG/ML
12.5 INJECTION INTRAMUSCULAR; INTRAVENOUS; SUBCUTANEOUS
Status: DISCONTINUED | OUTPATIENT
Start: 2021-02-24 | End: 2021-02-24 | Stop reason: HOSPADM

## 2021-02-24 RX ORDER — ONDANSETRON 2 MG/ML
INJECTION INTRAMUSCULAR; INTRAVENOUS PRN
Status: DISCONTINUED | OUTPATIENT
Start: 2021-02-24 | End: 2021-02-24

## 2021-02-24 RX ORDER — FENTANYL CITRATE 50 UG/ML
25-50 INJECTION, SOLUTION INTRAMUSCULAR; INTRAVENOUS
Status: DISCONTINUED | OUTPATIENT
Start: 2021-02-24 | End: 2021-02-24 | Stop reason: HOSPADM

## 2021-02-24 RX ORDER — NALOXONE HYDROCHLORIDE 0.4 MG/ML
0.2 INJECTION, SOLUTION INTRAMUSCULAR; INTRAVENOUS; SUBCUTANEOUS
Status: DISCONTINUED | OUTPATIENT
Start: 2021-02-24 | End: 2021-02-24

## 2021-02-24 RX ORDER — CLINDAMYCIN PHOSPHATE 900 MG/50ML
900 INJECTION, SOLUTION INTRAVENOUS SEE ADMIN INSTRUCTIONS
Status: DISCONTINUED | OUTPATIENT
Start: 2021-02-24 | End: 2021-02-24 | Stop reason: HOSPADM

## 2021-02-24 RX ORDER — OXYCODONE HCL 5 MG/5 ML
5-10 SOLUTION, ORAL ORAL EVERY 4 HOURS PRN
Qty: 473 ML | Refills: 0 | Status: SHIPPED | OUTPATIENT
Start: 2021-02-24 | End: 2021-03-19

## 2021-02-24 RX ORDER — FENTANYL CITRATE 50 UG/ML
INJECTION, SOLUTION INTRAMUSCULAR; INTRAVENOUS PRN
Status: DISCONTINUED | OUTPATIENT
Start: 2021-02-24 | End: 2021-02-24

## 2021-02-24 RX ORDER — CLINDAMYCIN PHOSPHATE 900 MG/50ML
900 INJECTION, SOLUTION INTRAVENOUS
Status: DISCONTINUED | OUTPATIENT
Start: 2021-02-24 | End: 2021-02-24 | Stop reason: HOSPADM

## 2021-02-24 RX ORDER — HYDROMORPHONE HYDROCHLORIDE 1 MG/ML
.3-.5 INJECTION, SOLUTION INTRAMUSCULAR; INTRAVENOUS; SUBCUTANEOUS EVERY 10 MIN PRN
Status: DISCONTINUED | OUTPATIENT
Start: 2021-02-24 | End: 2021-02-24 | Stop reason: HOSPADM

## 2021-02-24 RX ORDER — NALOXONE HYDROCHLORIDE 0.4 MG/ML
0.4 INJECTION, SOLUTION INTRAMUSCULAR; INTRAVENOUS; SUBCUTANEOUS
Status: DISCONTINUED | OUTPATIENT
Start: 2021-02-24 | End: 2021-02-24 | Stop reason: HOSPADM

## 2021-02-24 RX ORDER — PROPOFOL 10 MG/ML
INJECTION, EMULSION INTRAVENOUS CONTINUOUS PRN
Status: DISCONTINUED | OUTPATIENT
Start: 2021-02-24 | End: 2021-02-24

## 2021-02-24 RX ORDER — CEFAZOLIN SODIUM 1 G/3ML
INJECTION, POWDER, FOR SOLUTION INTRAMUSCULAR; INTRAVENOUS PRN
Status: DISCONTINUED | OUTPATIENT
Start: 2021-02-24 | End: 2021-02-24

## 2021-02-24 RX ORDER — ONDANSETRON 2 MG/ML
4 INJECTION INTRAMUSCULAR; INTRAVENOUS EVERY 30 MIN PRN
Status: DISCONTINUED | OUTPATIENT
Start: 2021-02-24 | End: 2021-02-24 | Stop reason: HOSPADM

## 2021-02-24 RX ORDER — SODIUM CHLORIDE, SODIUM LACTATE, POTASSIUM CHLORIDE, CALCIUM CHLORIDE 600; 310; 30; 20 MG/100ML; MG/100ML; MG/100ML; MG/100ML
INJECTION, SOLUTION INTRAVENOUS CONTINUOUS PRN
Status: DISCONTINUED | OUTPATIENT
Start: 2021-02-24 | End: 2021-02-24

## 2021-02-24 RX ORDER — LIDOCAINE HYDROCHLORIDE 20 MG/ML
INJECTION, SOLUTION INFILTRATION; PERINEURAL PRN
Status: DISCONTINUED | OUTPATIENT
Start: 2021-02-24 | End: 2021-02-24

## 2021-02-24 RX ORDER — PROPOFOL 10 MG/ML
INJECTION, EMULSION INTRAVENOUS PRN
Status: DISCONTINUED | OUTPATIENT
Start: 2021-02-24 | End: 2021-02-24

## 2021-02-24 RX ADMIN — Medication 100 MG: at 08:13

## 2021-02-24 RX ADMIN — ROCURONIUM BROMIDE 50 MG: 10 INJECTION INTRAVENOUS at 08:17

## 2021-02-24 RX ADMIN — ONDANSETRON 4 MG: 2 INJECTION INTRAMUSCULAR; INTRAVENOUS at 08:45

## 2021-02-24 RX ADMIN — PROPOFOL 70 MG: 10 INJECTION, EMULSION INTRAVENOUS at 08:18

## 2021-02-24 RX ADMIN — PROPOFOL 130 MG: 10 INJECTION, EMULSION INTRAVENOUS at 08:12

## 2021-02-24 RX ADMIN — FENTANYL CITRATE 50 MCG: 50 INJECTION, SOLUTION INTRAMUSCULAR; INTRAVENOUS at 08:29

## 2021-02-24 RX ADMIN — CEFAZOLIN 2 G: 1 INJECTION, POWDER, FOR SOLUTION INTRAMUSCULAR; INTRAVENOUS at 08:23

## 2021-02-24 RX ADMIN — FENTANYL CITRATE 50 MCG: 50 INJECTION, SOLUTION INTRAMUSCULAR; INTRAVENOUS at 08:06

## 2021-02-24 RX ADMIN — SUGAMMADEX 200 MG: 100 INJECTION, SOLUTION INTRAVENOUS at 08:50

## 2021-02-24 RX ADMIN — FENTANYL CITRATE 50 MCG: 50 INJECTION, SOLUTION INTRAMUSCULAR; INTRAVENOUS at 09:19

## 2021-02-24 RX ADMIN — PROPOFOL 150 MCG/KG/MIN: 10 INJECTION, EMULSION INTRAVENOUS at 08:14

## 2021-02-24 RX ADMIN — MIDAZOLAM 2 MG: 1 INJECTION INTRAMUSCULAR; INTRAVENOUS at 08:09

## 2021-02-24 RX ADMIN — Medication 10 ML: at 10:47

## 2021-02-24 RX ADMIN — DEXAMETHASONE SODIUM PHOSPHATE 4 MG: 4 INJECTION, SOLUTION INTRA-ARTICULAR; INTRALESIONAL; INTRAMUSCULAR; INTRAVENOUS; SOFT TISSUE at 08:34

## 2021-02-24 RX ADMIN — SODIUM CHLORIDE, POTASSIUM CHLORIDE, SODIUM LACTATE AND CALCIUM CHLORIDE: 600; 310; 30; 20 INJECTION, SOLUTION INTRAVENOUS at 07:52

## 2021-02-24 RX ADMIN — ONDANSETRON 4 MG: 2 INJECTION INTRAMUSCULAR; INTRAVENOUS at 09:28

## 2021-02-24 RX ADMIN — HYDROMORPHONE HYDROCHLORIDE 0.5 MG: 1 INJECTION, SOLUTION INTRAMUSCULAR; INTRAVENOUS; SUBCUTANEOUS at 09:45

## 2021-02-24 RX ADMIN — LIDOCAINE HYDROCHLORIDE 80 MG: 20 INJECTION, SOLUTION INFILTRATION; PERINEURAL at 08:13

## 2021-02-24 ASSESSMENT — MIFFLIN-ST. JEOR: SCORE: 1748.13

## 2021-02-24 NOTE — OP NOTE
Collected Lab    Upper EUS 02/24/2021  7:59 AM [facility], [address] [place] [address] [phone]     Endoscopy Department   _______________________________________________________________________________   Patient Name: Parker Acevedo         Procedure Date: 2/24/2021 7:59 AM   MRN: [id number]                       Account Number: [id number]   YOB: 1970              Admit Type: Outpatient   Age: 50                                Gender: Male   Note Status: Finalized                Attending MD: Berny Bach MD   Total Sedation Time:                     _______________________________________________________________________________       Procedure:           Upper EUS   Indications:         EUS guidance for gastrostomy   Providers:           Berny Bach MD, Koko Newman MD   Patient Profile:     Caren Acevedo is a 51yo gentleman with complex anatomy                        including RYGB who requires replacement of a                        decompressive gastrostomy tube to the remnant. He now                        proceeds to this procedure with Dr Vyas and me, with                        our component being EUS guidance for direct stick tube                        placement.   Referring MD:        Francis Vyas MD   Medicines:           General Anesthesia, Ancef 2000 mg IV   Complications:       No immediate complications.   _______________________________________________________________________________   Procedure:           Pre-Anesthesia Assessment:                        - Prior to the procedure, a History and Physical was                        performed, and patient medications and allergies were                        reviewed. The patient is competent. The risks and                        benefits of the procedure and the sedation options and                        risks were discussed with the patient. All questions                         were answered and informed consent was obtained. Patient                        identification and proposed procedure were verified by                        the nurse. Mental Status Examination: alert and                        oriented. Airway Examination: Mallampati Class III (part                        of the uvula and soft palate visualized). Respiratory                        Examination: clear to auscultation. CV Examination:                        normal. ASA Grade Assessment: III - A patient with                        severe systemic disease. After reviewing the risks and                        benefits, the patient was deemed in satisfactory                        condition to undergo the procedure. The anesthesia plan                        was to use general anesthesia. Immediately prior to                        administration of medications, the patient was                        re-assessed for adequacy to receive sedatives. The heart                        rate, respiratory rate, oxygen saturations, blood                        pressure, adequacy of pulmonary ventilation, and                        response to care were monitored throughout the                        procedure. The physical status of the patient was                        re-assessed after the procedure. After obtaining                        informed consent, the endoscope was passed under direct                        vision. Throughout the procedure, the patient's blood                        pressure, pulse, and oxygen saturations were monitored                        continuously. The echoendoscope was introduced through                        the mouth, and advanced to the jejunum. The upper EUS                        was accomplished without difficulty. The patient                        tolerated the procedure well.                                                                                      Findings:        White light and endosonographic findings :         films of the abdomen demonstrated surgical clips in various        locations. Limited white light imaging demonstrated a patent        esophagojejunal anastomosis and an unremarkable proximal jejunum. A        focused endosonographic exam was utilized to identify the remnant which        was grossly unermarkable. A window for access was then found across the        jejunum without intervening vessels. A 19g Flex needle was passed into        the remnant and air insufflation was performed to assist Dr Vyas with        his direct stick gastrostomy tube placement (see his associated note).        Upon insertion of the gastrostomy tube, the needle was withdrawn and the        endoscope removed.                                                                                     Impression:          - Uncomplicated EUS guidance with stomach for placement                        of a decompressive gatrostomy tube   Recommendation:      - General anesthesia recovery with probable discharge                        home this morning                        - Refert to Dr Vyas's note for related findings and                        recommendations (tube may be used immediately for                        decompression and is not intended to be used for                        nutrition)                        - The findings and recommendations were discussed with                        the patient and their family

## 2021-02-24 NOTE — ADDENDUM NOTE
Addendum  created 02/24/21 1127 by Jenny Arroyo MD    Clinical Note Signed, Intraprocedure Blocks edited, LDA created via procedure documentation

## 2021-02-24 NOTE — ANESTHESIA PROCEDURE NOTES
Airway   Date/Time: 2/24/2021 8:14 AM   Patient location during procedure: OR  Staff -   Anesthesiologist:  Hector Diggs MD  Resident/Fellow: Jenny Arroyo MD  Performed By: resident    Consent for Airway   Urgency: elective    Indications and Patient Condition  Indications for airway management: marisol-procedural  Induction type:RSIMask difficulty assessment: 0 - not attempted    Final Airway Details  Final airway type: endotracheal airway  Successful airway:ETT - single  Endotracheal Airway Details   ETT size (mm): 8.0  Cuffed: yes  Successful intubation technique: direct laryngoscopy  Grade View of Cords: 1 (epiglottis appears very short/near absent)  Measured from: gums/teeth  Secured at (cm): 22  Bite Block used: gi bite block.    Post intubation assessment   Placement verified by: capnometry, equal breath sounds and chest rise   Number of attempts at approach: 1  Ease of procedure: easy  Dentition: Intact and Unchanged

## 2021-02-24 NOTE — OR NURSING
Patient stated that he was not having an esophageal stent removed. MD Newman at bedside. MD Newman wrote new paper consent for entire procedure.

## 2021-02-24 NOTE — OR NURSING
Dr. Bach saw Parker right before he was discharged. Parker told Dr. Bach that he thought the G-tube was too long. Parker heard from the DrDaryl that his RN would be following up with him regarding the G-tube length.

## 2021-02-24 NOTE — DISCHARGE INSTRUCTIONS
[facility]  Same-Day Surgery   Adult Discharge Orders & Instructions     For 24 hours after surgery    1. Get plenty of rest.  A responsible adult must stay with you for at least 24 hours after you leave the hospital.   2. Do not drive or use heavy equipment.  If you have weakness or tingling, don't drive or use heavy equipment until this feeling goes away.  3. Do not drink alcohol.  4. Avoid strenuous or risky activities.  Ask for help when climbing stairs.   5. You may feel lightheaded.  IF so, sit for a few minutes before standing.  Have someone help you get up.   6. If you have nausea (feel sick to your stomach): Drink only clear liquids such as apple juice, ginger ale, broth or 7-Up.  Rest may also help.  Be sure to drink enough fluids.  Move to a regular diet as you feel able.  7. You may have a slight fever. Call the doctor if your fever is over 100 F (37.7 C) (taken under the tongue) or lasts longer than 24 hours.  8. You may have a dry mouth, a sore throat, muscle aches or trouble sleeping.  These should go away after 24 hours.  9. Do not make important or legal decisions.   Call your doctor for any of the followin.  Signs of infection (fever, growing tenderness at the surgery site, a large amount of drainage or bleeding, severe pain, foul-smelling drainage, redness, swelling).    2. It has been over 8 to 10 hours since surgery and you are still not able to urinate (pass water).    3.  Headache for over 24 hours.    4.  Numbness, tingling or weakness the day after surgery (if you had spinal anesthesia).  To contact a doctor, call Dr Bach at [phone] (clinic)   or:        [phone] and ask for the resident on call for   GI or gastroenterology (answered 24 hours a day)      Emergency Department:    [facility]: [phone]       (TTY for hearing impaired: [phone])    [facility]: [phone]       (TTY for hearing impaired:  [phone])

## 2021-02-24 NOTE — ANESTHESIA POSTPROCEDURE EVALUATION
Patient: Parker Acevedo    Procedure(s):  ENDOSCOPIC ULTRASOUND, ESOPHAGOSCOPY / UPPER GASTROINTESTINAL TRACT (GI), esophagastrogastroduodenoscopy  INSERTION, GASTROSTOMY TUBE, PERCUTANEOUS    Diagnosis:Gastric outlet obstruction [K31.1]  Diagnosis Additional Information: No value filed.    Anesthesia Type:  General    Note:  Disposition: Outpatient   Postop Pain Control: Uneventful            Sign Out: Well controlled pain   PONV: No   Neuro/Psych: Uneventful            Sign Out: Acceptable/Baseline neuro status   Airway/Respiratory: Uneventful            Sign Out: Acceptable/Baseline resp. status   CV/Hemodynamics: Uneventful            Sign Out: Acceptable CV status   Other NRE: NONE   DID A NON-ROUTINE EVENT OCCUR? No    Event details/Postop Comments:  No noted anesthetic complications.  Patient satisfied with anesthetic.           Last vitals:  Vitals:    02/24/21 0930 02/24/21 0945 02/24/21 0954   BP: (!) 137/105 128/85 132/89   Pulse: 70 60 59   Resp:  16    Temp: 36  C (96.8  F) 36  C (96.8  F) 36.4  C (97.6  F)   SpO2: 100% 100% 100%       Last vitals prior to Anesthesia Care Transfer:  CRNA VITALS  2/24/2021 0830 - 2/24/2021 0930      2/24/2021             NIBP:  107/84    Ht Rate:  71          Electronically Signed By: Hector Diggs MD  February 24, 2021  10:24 AM

## 2021-02-24 NOTE — ANESTHESIA CARE TRANSFER NOTE
Patient: Parker Acevedo    Procedure(s):  ENDOSCOPIC ULTRASOUND, ESOPHAGOSCOPY / UPPER GASTROINTESTINAL TRACT (GI), esophagastrogastroduodenoscopy  INSERTION, GASTROSTOMY TUBE, PERCUTANEOUS    Diagnosis: Gastric outlet obstruction [K31.1]  Diagnosis Additional Information: No value filed.    Anesthesia Type:   General     Note:    Oropharynx: spontaneously breathing  Level of Consciousness: awake  Oxygen Supplementation: nasal cannula  Level of Supplemental Oxygen (L/min / FiO2): 4  Independent Airway: airway patency satisfactory and stable  Dentition: dentition unchanged  Vital Signs Stable: post-procedure vital signs reviewed and stable  Report to RN Given: handoff report given  Patient transferred to: PACU    Handoff Report: Identifed the Patient, Identified the Reponsible Provider, Reviewed the pertinent medical history, Discussed the surgical course, Reviewed Intra-OP anesthesia mangement and issues during anesthesia, Set expectations for post-procedure period and Allowed opportunity for questions and acknowledgement of understanding      Vitals: (Last set prior to Anesthesia Care Transfer)  CRNA VITALS  2/24/2021 0830 - 2/24/2021 0907      2/24/2021             NIBP:  107/84    Ht Rate:  71        Electronically Signed By: Jenny Arroyo MD  February 24, 2021  9:07 AM

## 2021-02-25 ENCOUNTER — MYC MEDICAL ADVICE (OUTPATIENT)
Dept: PALLIATIVE MEDICINE | Facility: CLINIC | Age: 51
End: 2021-02-25

## 2021-02-25 DIAGNOSIS — R10.9 CHRONIC ABDOMINAL PAIN: ICD-10-CM

## 2021-02-25 DIAGNOSIS — G89.4 CHRONIC PAIN SYNDROME: ICD-10-CM

## 2021-02-25 DIAGNOSIS — Z98.1 STATUS POST CERVICAL SPINAL ARTHRODESIS: ICD-10-CM

## 2021-02-25 DIAGNOSIS — G89.29 CHRONIC ABDOMINAL PAIN: ICD-10-CM

## 2021-02-26 ENCOUNTER — PATIENT OUTREACH (OUTPATIENT)
Dept: FAMILY MEDICINE | Facility: CLINIC | Age: 51
End: 2021-02-26
Payer: COMMERCIAL

## 2021-02-26 ASSESSMENT — ACTIVITIES OF DAILY LIVING (ADL): DEPENDENT_IADLS:: MEDICATION MANAGEMENT;TRANSPORTATION;SHOPPING

## 2021-02-26 NOTE — TELEPHONE ENCOUNTER
From: Parker Acevedo      Created: 2/25/2021 11:52 AM        I just wanted to let you know that surgery went well yesterday just in a lot of pain from it but alwise doing good next month I would like to go up an extra 10 ml just for one month. Plus I wanted to let you know that the surgeon  wrote me out a prescription for 180 ml but I have not touched it at all I wanted to check with you first. Alwise I've been sticking with what you prescribed me at the 30ml a day. Thank you again so much. Just give me a call to let me know what I can do thank you        The prescription below was filled at discharge on 2/24/21. Routing patients request to Dr Milligan to review.    oxyCODONE (ROXICODONE) 5 MG/5ML solution 473 mL 0 2/24/2021  No   Sig - Route: Take 5-10 mLs (5-10 mg) by mouth every 4 hours as needed for severe pain     02/24/21 0952 Sign Koko Newman MD Jennifer Campbell, BSN, RN  Care Coordinator  [facility] [place]

## 2021-02-26 NOTE — TELEPHONE ENCOUNTER
Will ask nursing to call patient.    I am ok, with going up after surgery, but we would not do that for a month.      I recommend he do the 40mg/day x 2 weeks, and then drop back.  I think he will do this in four 10mg doses.  If at 2 weeks he has concerns about dropping back, he should call.    The amount they gave is an odd amount, and is a larger supply, so we will need to know what he had remaining and what he has now picked up.    Jessica Milligan MD  [facility]

## 2021-02-26 NOTE — LETTER
[facility]  Complex Care Plan  About Me:    Patient Name:  Parker Acevedo    YOB: 1970  Age:         50 year old   [facility] MRN:    [id number] Telephone Information:  Home Phone [phone]   Mobile Not on file.       Address:  [address]   [place] [address] Email address:  [email or web address]      Emergency Contact(s)    Name Relationship Lgl Grd Work Phone Home Phone Mobile Phone   1. BERTRAND RAMOS* Spouse No  [phone]    2. JEYSON CIAN * Relative No  [phone]   3. CHARLI ACEVEDO* Mother No  [phone]           Primary language:  English     needed? No   [facility]:  [phone] op. 1  Other communication barriers: Glasses, Physical impairment  Preferred Method of Communication:  MyChart  Current living arrangement: I live in a private home with spouse  Mobility Status/ Medical Equipment: Independent    Health Maintenance  Health Maintenance Reviewed: Due/Overdue     My Access Plan  Medical Emergency 911   Primary Clinic Line [facility] - [phone]   24 Hour Appointment Line [phone] or  [phone]) (toll-free)   24 Hour Nurse Line [phone] (toll-free)   Preferred Urgent Care Other   Preferred Hospital [facility]  [phone]   Preferred Pharmacy [facility] [place], [place] - [address] [place] [address]     Behavioral Health Crisis Line The National Suicide Prevention Lifeline at 1-191.426.5256 or 911     My Care Team Members  Patient Care Team       Relationship Specialty Notifications Start End    Chuy Isaac MD PCP - General Internal Medicine  10/30/18     Phone: [phone] Fax: [phone]         [address] [facility] [address]    Patti Milligan MD MD Pain Clinic  6/2/15     Phone: [phone] Fax: [phone] [address] [place] [address] [facility] [address]     Chuy Isaac MD Assigned PCP   11/11/18     Phone: [phone] Fax: [phone]         [address] [facility] [address]    Marlyn Jenkins MD MD Ophthalmology  1/29/19     Phone: [phone] Fax: [phone]         [address] [facility] [address] [facility] [address]    Marlyn Jenkins MD MD Ophthalmology  2/11/19     Phone: [phone] Fax: [phone]         [address] [facility] [address] [facility] [address]    Марина Buckner MD Assigned Infectious Disease Provider   10/23/20     Phone: [phone] Fax: [phone]         [address] [facility] [address] [facility] [address]    Francis Vyas MD Assigned Surgical Provider   10/23/20     Phone: [phone] Fax: [phone]         [address] [facility] [address] [facility] [address]    Skinny Friend DPM Assigned Musculoskeletal Provider   10/23/20     Phone: [phone] Fax: [phone]         [address] [facility] [address]    Stormy Rankin, RN Lead Care Coordinator Primary Care - [facility] Admissions 12/1/20     Phone: [phone]                 My Care Plans  Self Management and Treatment Plan  Goals and (Comments)  Goals        General    #2 Medical (pt-stated)     Goal Statement: I want to prevent hospital visits  Date Goal set: 9/9/2020   Barriers: on chronic TPN, IV line  Strengths: home infusion, care coordination  Date to Achieve By: 5/9/21  Patient expressed understanding of goal: yes  Action steps to achieve this goal:  1. I will complete IV antibiotics as prescribed. (completed)  2. I will follow up with infectious disease provider as scheduled 9/24/20. (completed)  3. I will stay at home during COVID-19 pandemic.   4. I will monitor my temperature daily as instructed and contact [facility] infusion for elevated temperature parameters.  5. I will have G-tube replaced as scheduled. (completed)             Action Plans on File:     Advance Care Plans/Directives Type:   Type Advanced Care Plans/Directives:  Advanced Directive - On File    My Medical and Care Information  Problem List   Patient Active Problem List   Diagnosis     Peptic ulcer disease     Gastric bypass status for obesity     Chronic abdominal pain     Vomiting     Anemia     ADHD (attention deficit hyperactivity disorder), inattentive type     Vitamin B12 deficiency without anemia     Thiamine deficiency     Dehydration     Dysphagia     Weight loss, non-intentional     Malnutrition (H)     Chronic anxiety     Constipation     Bile reflux esophagitis     Former smoker     Vitamin D deficiency     Iron deficiency     Health Care Home     Chronic pain     Insomnia     Positive blood culture     Fungemia     Chronic nausea     Gastrostomy tube in place (H)     Cardiomyopathy in nutritional diseases (H)     Short gut syndrome     Anxiety     Status post cervical spinal arthrodesis     Port or reservoir infection, initial encounter     Abnormal echocardiogram     Low serum iron     Anemia, iron deficiency     Catheter-related bloodstream infection (CRBSI)     Generalized weakness     S/P bariatric surgery     Hyperlipidemia LDL goal <130     Bacteremia     Infection by Candida species     PICC line infiltration, sequela     Gram-positive bacteremia     On total parenteral nutrition     Gastric outlet obstruction     Short bowel syndrome     Bloodstream infection associated with central venous catheter, initial encounter      Current Medications and Allergies:  See printed Medication Report.    Care Coordination Start Date: 1/10/2019   Frequency of Care Coordination: monthly   Form Last Updated: 02/26/2021

## 2021-02-26 NOTE — TELEPHONE ENCOUNTER
Spoke with Rose who sent the MyChart. Relayed the message below. Plan is to increase to 40 Ml per day for 1 week and provide an update next Friday. Nursing to calculate new fill date based on additional script filled and temporary increase. Will place information in med note.     YADIRA LazarN, RN  Care Coordinator  [facility] [place]

## 2021-02-26 NOTE — PROGRESS NOTES
"Clinic Care Coordination Contact    Clinic Care Coordination Contact  OUTREACH    Referral Information:  Referral Source: IP Report    Primary Diagnosis: SIRS/Sepsis    Chief Complaint   Patient presents with     Clinic Care Coordination - Follow-up     RNCC follow up call     Universal Utilization:   Clinic Utilization  Difficulty keeping appointments:: Yes  Compliance Concerns: Yes  No-Show Concerns: No  No PCP office visit in Past Year: No  Utilization    Last refreshed: 2/25/2021  9:46 AM: Hospital Admissions 5           Last refreshed: 2/25/2021  9:46 AM: ED Visits 3           Last refreshed: 2/25/2021  9:46 AM: No Show Count (past year) 3              Current as of: 2/25/2021  9:46 AM            Clinical Concerns:  Current Medical Concerns: Called and spoke with wife, patient just recently had surgery to have a G-tube reinserted.  Wife states he has had this out since last July.  Surgery went well however patient was expecting a \"Jim tube\" which is short instead of a long G-tube.  Wife states they discussed this with a surgeon and he will be able to have the Jim tube in 6 weeks this tube currently is just temporary.  Wife states patient is feeling a little better and that he is able to relieve some of the abdominal pressure via G-Tube.  Wife also reports patient did have a phone visit with cardiology hospital.  Patient will be following up with GI in 6 weeks and wife will call make appointment.    Current Behavioral Concerns: No current concerns  Education Provided to patient: Call with any questions or concerns  Pain  Pain (GOAL):: Yes  Type: Acute (<3mo)  Location of chronic pain:: chronic abdominal pain and lower back pain, generalized  Radiating: No  Progression: Waxing and Waning  Description of pain: Aching  Limitation of routine activities due to chronic pain:: Yes  Description: Unable to perform most daily activities (chores, hobbies, social activities, driving)  Alleviating Factors: Pain " Medication, Rest, Heat, Ice  Aggravating Factors: Eating, Positioning, Activity  Health Maintenance Reviewed: Due/Overdue    Clinical Pathway: None    Medication Management:  No questions or concerns    Functional Status:  Dependent ADLs:: Ambulation-cane, Bathing  Dependent IADLs:: Medication Management, Transportation, Shopping  Bed or wheelchair confined:: No  Mobility Status: Independent    Living Situation:  Current living arrangement:: I live in a private home with spouse  Type of residence:: Private home - stairs    Lifestyle & Psychosocial Needs:  Lifestyle     Physical activity     Days per week: 2 days     Minutes per session: 10 min     Stress: Only a little     Social Needs     Financial resource strain: Not hard at all     Food insecurity     Worry: Never true     Inability: Never true     Transportation needs     Medical: No     Non-medical: No     Diet:: Regular  Inadequate nutrition (GOAL):: No  Tube Feeding: No  Inadequate activity/exercise (GOAL):: No  Significant changes in sleep pattern (GOAL): No  Transportation means:: Regular car, Friend  Financial/Insurance concerns (GOAL):: No  Sikh or spiritual beliefs that impact treatment:: No  Mental health DX:: Yes  Mental health DX how managed:: Medication  Mental health management concern (GOAL):: No  Informal Support system:: Family, Spouse   Socioeconomic History     Marital status:      Spouse name: Rose     Number of children: 1     Years of education: Not on file     Highest education level: GED or equivalent   Relationships     Social connections     Talks on phone: Once a week     Gets together: Never     Attends Zoroastrian service: Never     Active member of club or organization: No     Attends meetings of clubs or organizations: Never     Relationship status:      Intimate partner violence     Fear of current or ex partner: No     Emotionally abused: No     Physically abused: No     Forced sexual activity: No     Tobacco  Use     Smoking status: Current Some Day Smoker     Types: Cigarettes     Smokeless tobacco: Never Used     Tobacco comment: 2/4/2021    smokes 3 cigarettes/day   Substance and Sexual Activity     Alcohol use: No     Frequency: Never     Drinks per session: Patient refused     Binge frequency: Never     Comment: quit 2002     Drug use: No     Sexual activity: Yes     Partners: Female     Birth control/protection: None     Comment: no protection        Resources and Interventions:  Current Resources:   Skilled Home Care Services: Skilled Nursing  Community Resources: Home Infusion, Home Care  Supplies used at home:: None  Equipment Currently Used at Home: cane, straight, shower chair    Advance Care Plan/Directive  Advanced Care Plans/Directives on file:: Yes  Type Advanced Care Plans/Directives: Advanced Directive - On File  Advanced Care Plan/Directive Status: Not Applicable    Referrals Placed: None     Goals       #2 Medical (pt-stated)      Goal Statement: I want to prevent hospital visits  Date Goal set: 9/9/2020   Barriers: on chronic TPN, IV line  Strengths: home infusion, care coordination  Date to Achieve By: 5/9/21  Patient expressed understanding of goal: yes  Action steps to achieve this goal:  1. I will complete IV antibiotics as prescribed. (completed)  2. I will follow up with infectious disease provider as scheduled 9/24/20. (completed)  3. I will stay at home during COVID-19 pandemic.   4. I will monitor my temperature daily as instructed and contact [facility] infusion for elevated temperature parameters.  5. I will have G-tube replaced as scheduled. (completed)           Patient/Caregiver understanding: Pt verbalizes understanding of follow up instructions and when scheduled appt date and times.     Outreach Frequency: monthly      Plan: RNCC follow-up in 1 month.       Dianelys MAYN, RN, PHN, CCM  Primary Clinic Care Coordination    [facility]  Primary Care  Clinics  [email or web address]   Office: [phone]  Employed by [facility]

## 2021-02-28 ENCOUNTER — MYC REFILL (OUTPATIENT)
Dept: FAMILY MEDICINE | Facility: CLINIC | Age: 51
End: 2021-02-28

## 2021-02-28 DIAGNOSIS — B37.2 YEAST INFECTION OF THE SKIN: ICD-10-CM

## 2021-03-01 ENCOUNTER — MYC MEDICAL ADVICE (OUTPATIENT)
Dept: INTERNAL MEDICINE | Facility: CLINIC | Age: 51
End: 2021-03-01

## 2021-03-01 DIAGNOSIS — F90.0 ADHD (ATTENTION DEFICIT HYPERACTIVITY DISORDER), INATTENTIVE TYPE: ICD-10-CM

## 2021-03-01 RX ORDER — NYSTATIN 100000 U/G
CREAM TOPICAL 2 TIMES DAILY
Qty: 30 G | Refills: 0 | Status: SHIPPED | OUTPATIENT
Start: 2021-03-01 | End: 2021-10-14

## 2021-03-01 RX ORDER — DEXTROAMPHETAMINE SACCHARATE, AMPHETAMINE ASPARTATE, DEXTROAMPHETAMINE SULFATE AND AMPHETAMINE SULFATE 5; 5; 5; 5 MG/1; MG/1; MG/1; MG/1
TABLET ORAL
Qty: 30 TABLET | Refills: 0 | Status: SHIPPED | OUTPATIENT
Start: 2021-03-01 | End: 2021-03-25

## 2021-03-02 ENCOUNTER — MEDICAL CORRESPONDENCE (OUTPATIENT)
Dept: HEALTH INFORMATION MANAGEMENT | Facility: CLINIC | Age: 51
End: 2021-03-02

## 2021-03-02 ENCOUNTER — MYC MEDICAL ADVICE (OUTPATIENT)
Dept: INTERNAL MEDICINE | Facility: CLINIC | Age: 51
End: 2021-03-02

## 2021-03-02 ENCOUNTER — HOSPITAL ENCOUNTER (OUTPATIENT)
Dept: LAB | Facility: CLINIC | Age: 51
Discharge: HOME | End: 2021-03-02
Attending: SURGERY | Admitting: SURGERY
Payer: COMMERCIAL

## 2021-03-02 LAB
ALBUMIN SERPL-MCNC: 3.5 G/DL (ref 3.4–5)
ALP SERPL-CCNC: 73 U/L (ref 40–150)
ALT SERPL W P-5'-P-CCNC: 27 U/L (ref 0–70)
ANION GAP SERPL CALCULATED.3IONS-SCNC: 7 MMOL/L (ref 3–14)
AST SERPL W P-5'-P-CCNC: 7 U/L (ref 0–45)
BASOPHILS # BLD AUTO: 0 10E9/L (ref 0–0.2)
BASOPHILS NFR BLD AUTO: 0.3 %
BILIRUB DIRECT SERPL-MCNC: <0.1 MG/DL (ref 0–0.2)
BILIRUB SERPL-MCNC: 0.3 MG/DL (ref 0.2–1.3)
BUN SERPL-MCNC: 9 MG/DL (ref 7–30)
CALCIUM SERPL-MCNC: 8.6 MG/DL (ref 8.5–10.1)
CHLORIDE SERPL-SCNC: 109 MMOL/L (ref 94–109)
CO2 SERPL-SCNC: 26 MMOL/L (ref 20–32)
CREAT SERPL-MCNC: 0.58 MG/DL (ref 0.66–1.25)
DIFFERENTIAL METHOD BLD: ABNORMAL
EOSINOPHIL NFR BLD AUTO: 1.3 %
ERYTHROCYTE [DISTWIDTH] IN BLOOD BY AUTOMATED COUNT: 17.1 % (ref 10–15)
GFR SERPL CREATININE-BSD FRML MDRD: >90 ML/MIN/{1.73_M2}
GLUCOSE SERPL-MCNC: 86 MG/DL (ref 70–99)
HCT VFR BLD AUTO: 39.5 % (ref 40–53)
HGB BLD-MCNC: 13.3 G/DL (ref 13.3–17.7)
IMM GRANULOCYTES # BLD: 0 10E9/L (ref 0–0.4)
IMM GRANULOCYTES NFR BLD: 0.2 %
LYMPHOCYTES # BLD AUTO: 1.6 10E9/L (ref 0.8–5.3)
LYMPHOCYTES NFR BLD AUTO: 17.8 %
MAGNESIUM SERPL-MCNC: 1.9 MG/DL (ref 1.6–2.3)
MCH RBC QN AUTO: 30.7 PG (ref 26.5–33)
MCHC RBC AUTO-ENTMCNC: 33.7 G/DL (ref 31.5–36.5)
MCV RBC AUTO: 91 FL (ref 78–100)
MONOCYTES # BLD AUTO: 0.7 10E9/L (ref 0–1.3)
MONOCYTES NFR BLD AUTO: 8.2 %
NEUTROPHILS # BLD AUTO: 6.3 10E9/L (ref 1.6–8.3)
NEUTROPHILS NFR BLD AUTO: 72.2 %
NRBC # BLD AUTO: 0 10*3/UL
NRBC BLD AUTO-RTO: 0 /100
PHOSPHATE SERPL-MCNC: 2.9 MG/DL (ref 2.5–4.5)
PLATELET # BLD AUTO: 186 10E9/L (ref 150–450)
POTASSIUM SERPL-SCNC: 3.4 MMOL/L (ref 3.4–5.3)
PROT SERPL-MCNC: 6.7 G/DL (ref 6.8–8.8)
RBC # BLD AUTO: 4.33 10E12/L (ref 4.4–5.9)
SODIUM SERPL-SCNC: 142 MMOL/L (ref 133–144)
TRIGL SERPL-MCNC: 141 MG/DL
WBC # BLD AUTO: 8.7 10E9/L (ref 4–11)

## 2021-03-02 PROCEDURE — 84478 ASSAY OF TRIGLYCERIDES: CPT | Performed by: SURGERY

## 2021-03-02 PROCEDURE — 84100 ASSAY OF PHOSPHORUS: CPT | Performed by: SURGERY

## 2021-03-02 PROCEDURE — 85025 COMPLETE CBC W/AUTO DIFF WBC: CPT | Mod: GZ | Performed by: SURGERY

## 2021-03-02 PROCEDURE — 80053 COMPREHEN METABOLIC PANEL: CPT | Performed by: SURGERY

## 2021-03-02 PROCEDURE — 83735 ASSAY OF MAGNESIUM: CPT | Performed by: SURGERY

## 2021-03-02 PROCEDURE — 82248 BILIRUBIN DIRECT: CPT | Performed by: SURGERY

## 2021-03-03 ENCOUNTER — HOME INFUSION (PRE-WILLOW HOME INFUSION) (OUTPATIENT)
Dept: PHARMACY | Facility: CLINIC | Age: 51
End: 2021-03-03

## 2021-03-04 ENCOUNTER — HOME INFUSION (PRE-WILLOW HOME INFUSION) (OUTPATIENT)
Dept: PHARMACY | Facility: CLINIC | Age: 51
End: 2021-03-04

## 2021-03-04 ENCOUNTER — TELEPHONE (OUTPATIENT)
Dept: RESPIRATORY THERAPY | Facility: CLINIC | Age: 51
End: 2021-03-04

## 2021-03-04 NOTE — TELEPHONE ENCOUNTER
Spoke with Parker today for ongoing smoking cessation counseling. He stated that he has continued to smoke. He acknowledges that he smokes 6 cig/day. This is up from last phone call when he was smoking 3/day. Inquired as to why he has increased his tobacco use. He stated that he has been more stressed. He is willing to begin using the patch again and will start with 7mg. I let him know that if this is not strong enough he can change to the 14 mg or place 2- 7 mg patches on.    He wishes to continue with phone calls for additional support.    Mildred Webb, YADIRA, RRT, CTTS  Chronic Pulmonary Disease Specialist  Office: [phone]   Pager: [phone]

## 2021-03-04 NOTE — PROGRESS NOTES
This is a recent snapshot of the patient's [facility] medical record.  For current drug dose and complete information and questions, call [phone] or In Basket pool, [facility]v home infusion ([id number])  CSN Number:  [id number]

## 2021-03-05 ENCOUNTER — MYC MEDICAL ADVICE (OUTPATIENT)
Dept: PALLIATIVE MEDICINE | Facility: CLINIC | Age: 51
End: 2021-03-05

## 2021-03-05 NOTE — TELEPHONE ENCOUNTER
Patient called 2X asking to speak to a nurse.      Surekha Mcdaniel    [facility]

## 2021-03-05 NOTE — PROGRESS NOTES
This is a recent snapshot of the patient's [facility] medical record.  For current drug dose and complete information and questions, call [phone] or In Basket pool, fv home infusion ([id number])  CSN Number:  [id number]

## 2021-03-05 NOTE — TELEPHONE ENCOUNTER
Patient filled 900 mL to start 2/9/21  Patient filled an additional 473 mL on 2/24/21 for a total of 1373 mL     He used 30 mL/day from 02/09/21- 02/25/21 for a total of 510 mL  He used 40 mL/day from 02/26/21- 03/05/21 for a total of 440 mL    1373-950 mL = Patient should have 423 mL at the end of day today.    Outreach X1. Left a  requesting call back. Provided call back number.    YADIRA LazarN, RN  Care Coordinator  [facility] [place]

## 2021-03-08 RX ORDER — OXYCODONE HCL 5 MG/5 ML
5-10 SOLUTION, ORAL ORAL 3 TIMES DAILY PRN
Qty: 900 ML | Refills: 0 | Status: SHIPPED | OUTPATIENT
Start: 2021-03-08 | End: 2021-03-25

## 2021-03-08 RX ORDER — FENTANYL 25 UG/1
1 PATCH TRANSDERMAL
Qty: 15 PATCH | Refills: 0 | Status: SHIPPED | OUTPATIENT
Start: 2021-03-08 | End: 2021-03-25

## 2021-03-08 NOTE — TELEPHONE ENCOUNTER
Spoke with pharmacy after getting a refill request and patient only received 180 mL at discharge not 473 mL listed on script.     He will need this filled to start 3/10/21. Added Fentanyl as that is due as well.    Jyothi Winchester, YADIRAN, RN  Care Coordinator  [facility] [place]

## 2021-03-08 NOTE — TELEPHONE ENCOUNTER
Script Eprescribed to pharmacy  [place] Prescription Monitoring Program checked    Will send this to MA team to notify patient.    Signed Prescriptions:                        Disp   Refills    oxyCODONE (ROXICODONE) 5 MG/5ML solution   900 mL 0        Sig: Take 5-10 mLs (5-10 mg) by mouth 3 times daily as           needed for pain Max of 30mg/day. Fill 03/08/21 to           start on/after 03/10/21. 30 day supply.  Authorizing Provider: BRANDYN JENKINS    fentaNYL (DURAGESIC) 25 mcg/hr 72 hr patch 15 pat*0        Sig: Place 1 patch onto the skin every 48 hours remove old           patch.   May fill 03/08/21 and start 03/10/21  Authorizing Provider: BRANDYN JENKINS MD  [facility]

## 2021-03-09 NOTE — TELEPHONE ENCOUNTER
Medication Reconciliation Received response from insurance:  PA Denied.  Your plan covers this drug when you (pateint) meet one of these conditions:   - you have hyperemesis gravidarum  - you are undergoing radiation therapy  - you are undergoing moderate to highly emetogenic chemotherapy    Provider, please advise.

## 2021-03-09 NOTE — TELEPHONE ENCOUNTER
This was managed in a separate encounter.    YADIRA LazarN, RN  Care Coordinator  [facility] [place]

## 2021-03-09 NOTE — TELEPHONE ENCOUNTER
[employer] message sent with Rx approvals from provider.  Stephon  Pain Clinic Management Team

## 2021-03-12 ENCOUNTER — HOME INFUSION (PRE-WILLOW HOME INFUSION) (OUTPATIENT)
Dept: PHARMACY | Facility: CLINIC | Age: 51
End: 2021-03-12

## 2021-03-14 NOTE — OP NOTE
[facility]    Operative Note    Pre-operative diagnosis: Gastric outlet obstruction [K31.1]   Post-operative diagnosis * No post-op diagnosis entered *   Procedure: Procedure(s):  ENDOSCOPIC ULTRASOUND, ESOPHAGOSCOPY / UPPER GASTROINTESTINAL TRACT (GI), esophagastrogastroduodenoscopy  INSERTION, GASTROSTOMY TUBE, PERCUTANEOUS     Surgeon: Surgeon(s) and Role:  Panel 1:     * Berny Bach MD - Primary     * Koko Newman MD  Panel 2:     * Francis Vyas MD - Primary   Anesthesia: General    Estimated blood loss: * No values recorded between 2/24/2021  8:30 AM and 2/24/2021  9:01 AM *   Drains: None   Specimens: * No specimens in log *   Findings: The gastric remnant was found using fluoroscopy and EUS-guidance; There was no evidence of free air or bowel perforation.  The gastrostomy tube was placed under direct guidance with fluoroscopy and placement was confirmed with contrast injected through the tube into the stomach.  The balloon was injected with dilute contrast.     Complications: None.   Implants: None.           COMORBIDITIES:   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     Complication of anesthesia      Difficulty swallowing      Gastric ulcer, unspecified as acute or chronic, without mention of hemorrhage, perforation, or obstruction      Gastro-oesophageal reflux disease      Head injury      Hiatal hernia      Other bladder disorder      Other chronic pain      PONV (postoperative nausea and vomiting)      Severe malnutrition (H)     TPN     Short gut syndrome      Tobacco abuse        INDICATIONS FOR PROCEDURE:  After understanding the risks and benefits of proceeding  with gastrostomy tube placement, he agreed to an operation.  Parker has had g-tube previously, but was not able to replace the previous g-tube in time; hence, the opening closed and he no longer has gastric remnant access, which is necessary for decompression purposes.    General risks related to abdominal surgery were reviewed with the patient.  Risk of need for conversion to laparoscopic or open gastrostomy tube placement were discussed.    These include, but are not limited to, death, myocardial infarction, pneumonia, urinary tract infection, deep venous thrombosis with or without pulmonary embolus, abdominal infection from bowel injury or abscess, bowel obstruction, wound infection, and bleeding.    Risks related to gastrostomy tube placement, including the pain associated with a G-tube were previously reviewed with the patient.    DESCRIPTION OF PROCEDURE:     The procedure was done in conjunction with Dr. Bach. He will document his portion of the case in a separate note.     The patient was brought to the OR and placed in supine position on the operating room table. After adequate general anesthesia was established, the patient's abdomen was prepped and draped in the standard sterile fashion. First, EUS-guidance (by the EUS team) was used to identify the gastric remnant.  A needle was passed into the gastric remnant and entry was confirmed by contrast injection and wire placement. The wire followed the greater curvature of the gastric remnant.    Air was injection through the EUS needle and the gastric remnant was insufflated and this was directly visualized on live fluoroscopy.     Next, I accessed the gastric remnant directly through the anterior abdominal wall at the left upper quadrant using an 18g introducer needle.  Confirmation of entry was by air aspiration through contrast which demonstrated bubbling followed by contrast injection.    Next, the gastrocutaneous tract was dilated using a 20Fr peelaway  sheath.    A 16 Fr G-tube was placed over the wire.    Placement was documented using fluoroscopy and 10cc was added to balloon of g-tube, 1:10 contrast.     I was present for all critical portions of the procedure.     All sponge and needle counts were correct x 2 at the end of the procedure.    Francis Vyas MD  Surgery  [phone] (hospital )  [phone] (clinic nurses)

## 2021-03-15 ENCOUNTER — MYC MEDICAL ADVICE (OUTPATIENT)
Dept: INFECTIOUS DISEASES | Facility: CLINIC | Age: 51
End: 2021-03-15

## 2021-03-15 DIAGNOSIS — E44.0 MODERATE PROTEIN-CALORIE MALNUTRITION (H): Primary | ICD-10-CM

## 2021-03-15 NOTE — PROGRESS NOTES
This is a recent snapshot of the patient's [facility] medical record.  For current drug dose and complete information and questions, call [phone] or In Basket pool, fv home infusion ([id number])  CSN Number:  [id number]

## 2021-03-16 ENCOUNTER — HOME INFUSION (PRE-WILLOW HOME INFUSION) (OUTPATIENT)
Dept: PHARMACY | Facility: CLINIC | Age: 51
End: 2021-03-16

## 2021-03-16 DIAGNOSIS — Z20.822 ENCOUNTER FOR LABORATORY TESTING FOR COVID-19 VIRUS: Primary | ICD-10-CM

## 2021-03-16 DIAGNOSIS — E44.0 MODERATE PROTEIN-CALORIE MALNUTRITION (H): Primary | ICD-10-CM

## 2021-03-16 NOTE — PROGRESS NOTES
Patient nicked Cm catheter at home and needs it replaced.  Discussed requirement with IR.  Order placed.

## 2021-03-17 ENCOUNTER — TELEPHONE (OUTPATIENT)
Dept: INTERVENTIONAL RADIOLOGY/VASCULAR | Facility: CLINIC | Age: 51
End: 2021-03-17

## 2021-03-17 DIAGNOSIS — Z20.822 ENCOUNTER FOR LABORATORY TESTING FOR COVID-19 VIRUS: ICD-10-CM

## 2021-03-17 LAB
SARS-COV-2 RNA RESP QL NAA+PROBE: NORMAL
SPECIMEN SOURCE: NORMAL

## 2021-03-17 PROCEDURE — U0003 INFECTIOUS AGENT DETECTION BY NUCLEIC ACID (DNA OR RNA); SEVERE ACUTE RESPIRATORY SYNDROME CORONAVIRUS 2 (SARS-COV-2) (CORONAVIRUS DISEASE [COVID-19]), AMPLIFIED PROBE TECHNIQUE, MAKING USE OF HIGH THROUGHPUT TECHNOLOGIES AS DESCRIBED BY CMS-2020-01-R: HCPCS | Performed by: NURSE PRACTITIONER

## 2021-03-17 PROCEDURE — U0005 INFEC AGEN DETEC AMPLI PROBE: HCPCS | Performed by: NURSE PRACTITIONER

## 2021-03-17 NOTE — PROGRESS NOTES
This is a recent snapshot of the patient's [facility] medical record.  For current drug dose and complete information and questions, call [phone] or In Basket pool, fv home infusion ([id number])  CSN Number:  [id number]

## 2021-03-19 ENCOUNTER — HOME INFUSION (PRE-WILLOW HOME INFUSION) (OUTPATIENT)
Dept: PHARMACY | Facility: CLINIC | Age: 51
End: 2021-03-19

## 2021-03-19 ENCOUNTER — APPOINTMENT (OUTPATIENT)
Dept: MEDSURG UNIT | Facility: CLINIC | Age: 51
DRG: 314 | End: 2021-03-19
Attending: RADIOLOGY
Payer: COMMERCIAL

## 2021-03-19 ENCOUNTER — APPOINTMENT (OUTPATIENT)
Dept: GENERAL RADIOLOGY | Facility: CLINIC | Age: 51
DRG: 314 | End: 2021-03-19
Attending: EMERGENCY MEDICINE
Payer: COMMERCIAL

## 2021-03-19 ENCOUNTER — HOSPITAL ENCOUNTER (OUTPATIENT)
Facility: CLINIC | Age: 51
Discharge: HOME | DRG: 314 | End: 2021-03-19
Attending: RADIOLOGY | Admitting: PHYSICIAN ASSISTANT
Payer: COMMERCIAL

## 2021-03-19 ENCOUNTER — APPOINTMENT (OUTPATIENT)
Dept: INTERVENTIONAL RADIOLOGY/VASCULAR | Facility: CLINIC | Age: 51
DRG: 314 | End: 2021-03-19
Attending: NURSE PRACTITIONER
Payer: COMMERCIAL

## 2021-03-19 ENCOUNTER — HOSPITAL ENCOUNTER (INPATIENT)
Facility: CLINIC | Age: 51
LOS: 4 days | Discharge: HOME | DRG: 314 | End: 2021-03-23
Attending: EMERGENCY MEDICINE | Admitting: INTERNAL MEDICINE
Payer: COMMERCIAL

## 2021-03-19 VITALS
RESPIRATION RATE: 16 BRPM | TEMPERATURE: 102.5 F | OXYGEN SATURATION: 98 % | SYSTOLIC BLOOD PRESSURE: 133 MMHG | DIASTOLIC BLOOD PRESSURE: 79 MMHG | HEART RATE: 78 BPM

## 2021-03-19 DIAGNOSIS — Z98.84 S/P BARIATRIC SURGERY: ICD-10-CM

## 2021-03-19 DIAGNOSIS — E86.0 DEHYDRATION: ICD-10-CM

## 2021-03-19 DIAGNOSIS — A41.9 BACTERIAL SEPSIS (H): ICD-10-CM

## 2021-03-19 DIAGNOSIS — G47.00 INSOMNIA, UNSPECIFIED TYPE: ICD-10-CM

## 2021-03-19 DIAGNOSIS — R50.9 FEVER, UNKNOWN ORIGIN: ICD-10-CM

## 2021-03-19 DIAGNOSIS — K90.829 SHORT BOWEL SYNDROME: Primary | ICD-10-CM

## 2021-03-19 DIAGNOSIS — B49 FUNGEMIA: ICD-10-CM

## 2021-03-19 DIAGNOSIS — Z98.84 GASTRIC BYPASS STATUS FOR OBESITY: ICD-10-CM

## 2021-03-19 DIAGNOSIS — E44.0 MODERATE PROTEIN-CALORIE MALNUTRITION (H): ICD-10-CM

## 2021-03-19 LAB
ALBUMIN SERPL-MCNC: 3.5 G/DL (ref 3.4–5)
ALBUMIN UR-MCNC: 20 MG/DL
ALP SERPL-CCNC: 73 U/L (ref 40–150)
ALT SERPL W P-5'-P-CCNC: 26 U/L (ref 0–70)
ANION GAP SERPL CALCULATED.3IONS-SCNC: 8 MMOL/L (ref 3–14)
APPEARANCE UR: CLEAR
AST SERPL W P-5'-P-CCNC: 12 U/L (ref 0–45)
BILIRUB SERPL-MCNC: 0.5 MG/DL (ref 0.2–1.3)
BILIRUB UR QL STRIP: NEGATIVE
BUN SERPL-MCNC: 8 MG/DL (ref 7–30)
CALCIUM SERPL-MCNC: 8.7 MG/DL (ref 8.5–10.1)
CHLORIDE SERPL-SCNC: 106 MMOL/L (ref 94–109)
CO2 SERPL-SCNC: 26 MMOL/L (ref 20–32)
COLOR UR AUTO: YELLOW
CREAT SERPL-MCNC: 0.68 MG/DL (ref 0.66–1.25)
DIFFERENTIAL METHOD BLD: ABNORMAL
ERYTHROCYTE [DISTWIDTH] IN BLOOD BY AUTOMATED COUNT: 15.8 % (ref 10–15)
GFR SERPL CREATININE-BSD FRML MDRD: >90 ML/MIN/{1.73_M2}
GLUCOSE SERPL-MCNC: 97 MG/DL (ref 70–99)
GLUCOSE UR STRIP-MCNC: NEGATIVE MG/DL
HCT VFR BLD AUTO: 41.8 % (ref 40–53)
HGB BLD-MCNC: 14 G/DL (ref 13.3–17.7)
HGB UR QL STRIP: ABNORMAL
KETONES UR STRIP-MCNC: 60 MG/DL
LACTATE BLD-SCNC: 0.9 MMOL/L (ref 0.7–2)
LEUKOCYTE ESTERASE UR QL STRIP: NEGATIVE
LYMPHOCYTES # BLD AUTO: 0.5 10E9/L (ref 0.8–5.3)
LYMPHOCYTES NFR BLD AUTO: 7 %
MCH RBC QN AUTO: 31.2 PG (ref 26.5–33)
MCHC RBC AUTO-ENTMCNC: 33.5 G/DL (ref 31.5–36.5)
MCV RBC AUTO: 93 FL (ref 78–100)
MONOCYTES # BLD AUTO: 0.7 10E9/L (ref 0–1.3)
MONOCYTES NFR BLD AUTO: 10 %
MUCOUS THREADS #/AREA URNS LPF: PRESENT /LPF
NEUTROPHILS # BLD AUTO: 5.4 10E9/L (ref 1.6–8.3)
NEUTROPHILS NFR BLD AUTO: 83 %
NITRATE UR QL: NEGATIVE
PH UR STRIP: 7.5 PH (ref 5–7)
PLATELET # BLD AUTO: 147 10E9/L (ref 150–450)
POTASSIUM SERPL-SCNC: 3.2 MMOL/L (ref 3.4–5.3)
PROCALCITONIN SERPL-MCNC: 0.06 NG/ML
PROT SERPL-MCNC: 7 G/DL (ref 6.8–8.8)
RBC # BLD AUTO: 4.49 10E12/L (ref 4.4–5.9)
RBC #/AREA URNS AUTO: 23 /HPF (ref 0–2)
SODIUM SERPL-SCNC: 140 MMOL/L (ref 133–144)
SOURCE: ABNORMAL
SP GR UR STRIP: 1.02 (ref 1–1.03)
SQUAMOUS #/AREA URNS AUTO: 0 /HPF (ref 0–1)
UROBILINOGEN UR STRIP-MCNC: NORMAL MG/DL (ref 0–2)
WBC # BLD AUTO: 6.6 10E9/L (ref 4–11)
WBC #/AREA URNS AUTO: 1 /HPF (ref 0–5)

## 2021-03-19 PROCEDURE — 87075 CULTR BACTERIA EXCEPT BLOOD: CPT | Performed by: STUDENT IN AN ORGANIZED HEALTH CARE EDUCATION/TRAINING PROGRAM

## 2021-03-19 PROCEDURE — 87070 CULTURE OTHR SPECIMN AEROBIC: CPT | Performed by: STUDENT IN AN ORGANIZED HEALTH CARE EDUCATION/TRAINING PROGRAM

## 2021-03-19 PROCEDURE — 250N000011 HC RX IP 250 OP 636: Performed by: EMERGENCY MEDICINE

## 2021-03-19 PROCEDURE — 87077 CULTURE AEROBIC IDENTIFY: CPT | Performed by: EMERGENCY MEDICINE

## 2021-03-19 PROCEDURE — 36589 REMOVAL TUNNELED CV CATH: CPT

## 2021-03-19 PROCEDURE — 214N000002 HC R&B CCU OVERFLOW UMMC

## 2021-03-19 PROCEDURE — 99285 EMERGENCY DEPT VISIT HI MDM: CPT | Mod: 25 | Performed by: EMERGENCY MEDICINE

## 2021-03-19 PROCEDURE — 87186 SC STD MICRODIL/AGAR DIL: CPT | Performed by: STUDENT IN AN ORGANIZED HEALTH CARE EDUCATION/TRAINING PROGRAM

## 2021-03-19 PROCEDURE — 71046 X-RAY EXAM CHEST 2 VIEWS: CPT

## 2021-03-19 PROCEDURE — 96366 THER/PROPH/DIAG IV INF ADDON: CPT | Performed by: EMERGENCY MEDICINE

## 2021-03-19 PROCEDURE — 250N000011 HC RX IP 250 OP 636: Performed by: PHYSICIAN ASSISTANT

## 2021-03-19 PROCEDURE — 250N000009 HC RX 250: Performed by: NURSE PRACTITIONER

## 2021-03-19 PROCEDURE — 258N000001 HC RX 258: Performed by: PHYSICIAN ASSISTANT

## 2021-03-19 PROCEDURE — 87186 SC STD MICRODIL/AGAR DIL: CPT | Performed by: EMERGENCY MEDICINE

## 2021-03-19 PROCEDURE — 250N000013 HC RX MED GY IP 250 OP 250 PS 637: Performed by: NURSE PRACTITIONER

## 2021-03-19 PROCEDURE — 85025 COMPLETE CBC W/AUTO DIFF WBC: CPT | Performed by: EMERGENCY MEDICINE

## 2021-03-19 PROCEDURE — 250N000013 HC RX MED GY IP 250 OP 250 PS 637: Performed by: EMERGENCY MEDICINE

## 2021-03-19 PROCEDURE — 250N000013 HC RX MED GY IP 250 OP 250 PS 637: Performed by: PHYSICIAN ASSISTANT

## 2021-03-19 PROCEDURE — 96365 THER/PROPH/DIAG IV INF INIT: CPT | Performed by: EMERGENCY MEDICINE

## 2021-03-19 PROCEDURE — 05PYX3Z REMOVAL OF INFUSION DEVICE FROM UPPER VEIN, EXTERNAL APPROACH: ICD-10-PCS | Performed by: EMERGENCY MEDICINE

## 2021-03-19 PROCEDURE — 87077 CULTURE AEROBIC IDENTIFY: CPT | Performed by: STUDENT IN AN ORGANIZED HEALTH CARE EDUCATION/TRAINING PROGRAM

## 2021-03-19 PROCEDURE — 87040 BLOOD CULTURE FOR BACTERIA: CPT | Performed by: EMERGENCY MEDICINE

## 2021-03-19 PROCEDURE — 99223 1ST HOSP IP/OBS HIGH 75: CPT | Mod: AI | Performed by: INTERNAL MEDICINE

## 2021-03-19 PROCEDURE — 36589 REMOVAL TUNNELED CV CATH: CPT | Mod: RT | Performed by: PHYSICIAN ASSISTANT

## 2021-03-19 PROCEDURE — 84145 PROCALCITONIN (PCT): CPT | Performed by: EMERGENCY MEDICINE

## 2021-03-19 PROCEDURE — 80053 COMPREHEN METABOLIC PANEL: CPT | Performed by: EMERGENCY MEDICINE

## 2021-03-19 PROCEDURE — 83605 ASSAY OF LACTIC ACID: CPT | Performed by: EMERGENCY MEDICINE

## 2021-03-19 PROCEDURE — 87800 DETECT AGNT MULT DNA DIREC: CPT | Performed by: EMERGENCY MEDICINE

## 2021-03-19 PROCEDURE — 99285 EMERGENCY DEPT VISIT HI MDM: CPT | Performed by: EMERGENCY MEDICINE

## 2021-03-19 PROCEDURE — 258N000003 HC RX IP 258 OP 636: Performed by: PHYSICIAN ASSISTANT

## 2021-03-19 PROCEDURE — 71046 X-RAY EXAM CHEST 2 VIEWS: CPT | Mod: 26 | Performed by: RADIOLOGY

## 2021-03-19 PROCEDURE — 81001 URINALYSIS AUTO W/SCOPE: CPT | Performed by: EMERGENCY MEDICINE

## 2021-03-19 PROCEDURE — 96367 TX/PROPH/DG ADDL SEQ IV INF: CPT | Performed by: EMERGENCY MEDICINE

## 2021-03-19 PROCEDURE — 99207 PR APP CREDIT; MD BILLING SHARED VISIT: CPT | Performed by: PHYSICIAN ASSISTANT

## 2021-03-19 PROCEDURE — 999N000142 HC STATISTIC PROCEDURE PREP ONLY

## 2021-03-19 PROCEDURE — 96361 HYDRATE IV INFUSION ADD-ON: CPT | Performed by: EMERGENCY MEDICINE

## 2021-03-19 RX ORDER — ONDANSETRON 4 MG/1
4 TABLET, ORALLY DISINTEGRATING ORAL EVERY 6 HOURS PRN
Status: DISCONTINUED | OUTPATIENT
Start: 2021-03-19 | End: 2021-03-23 | Stop reason: HOSPADM

## 2021-03-19 RX ORDER — HEPARIN SODIUM,PORCINE 10 UNIT/ML
5 VIAL (ML) INTRAVENOUS
Status: DISCONTINUED | OUTPATIENT
Start: 2021-03-19 | End: 2021-03-19 | Stop reason: HOSPADM

## 2021-03-19 RX ORDER — DEXTROSE MONOHYDRATE, SODIUM CHLORIDE, AND POTASSIUM CHLORIDE 50; 1.49; 9 G/1000ML; G/1000ML; G/1000ML
INJECTION, SOLUTION INTRAVENOUS CONTINUOUS
Status: DISCONTINUED | OUTPATIENT
Start: 2021-03-19 | End: 2021-03-20

## 2021-03-19 RX ORDER — OXYCODONE HCL 5 MG/5 ML
10 SOLUTION, ORAL ORAL EVERY 4 HOURS PRN
Status: COMPLETED | OUTPATIENT
Start: 2021-03-19 | End: 2021-03-19

## 2021-03-19 RX ORDER — QUETIAPINE FUMARATE 25 MG/1
25 TABLET, FILM COATED ORAL AT BEDTIME
Status: DISCONTINUED | OUTPATIENT
Start: 2021-03-19 | End: 2021-03-23 | Stop reason: HOSPADM

## 2021-03-19 RX ORDER — LORAZEPAM 2 MG/ML
1 CONCENTRATE ORAL
Status: DISCONTINUED | OUTPATIENT
Start: 2021-03-19 | End: 2021-03-23 | Stop reason: HOSPADM

## 2021-03-19 RX ORDER — LIDOCAINE 40 MG/G
CREAM TOPICAL
Status: DISCONTINUED | OUTPATIENT
Start: 2021-03-19 | End: 2021-03-19 | Stop reason: HOSPADM

## 2021-03-19 RX ORDER — DEXTROAMPHETAMINE SACCHARATE, AMPHETAMINE ASPARTATE, DEXTROAMPHETAMINE SULFATE AND AMPHETAMINE SULFATE 2.5; 2.5; 2.5; 2.5 MG/1; MG/1; MG/1; MG/1
20 TABLET ORAL DAILY
Status: DISCONTINUED | OUTPATIENT
Start: 2021-03-20 | End: 2021-03-23 | Stop reason: HOSPADM

## 2021-03-19 RX ORDER — ERGOCALCIFEROL 1.25 MG/1
50000 CAPSULE, LIQUID FILLED ORAL WEEKLY
Status: DISCONTINUED | OUTPATIENT
Start: 2021-03-21 | End: 2021-03-23 | Stop reason: HOSPADM

## 2021-03-19 RX ORDER — DIPHENHYDRAMINE HYDROCHLORIDE 50 MG/ML
25 INJECTION INTRAMUSCULAR; INTRAVENOUS ONCE
Status: COMPLETED | OUTPATIENT
Start: 2021-03-19 | End: 2021-03-19

## 2021-03-19 RX ORDER — OXYCODONE HCL 5 MG/5 ML
5-10 SOLUTION, ORAL ORAL 3 TIMES DAILY PRN
Status: DISCONTINUED | OUTPATIENT
Start: 2021-03-19 | End: 2021-03-20

## 2021-03-19 RX ORDER — PROCHLORPERAZINE MALEATE 10 MG
10 TABLET ORAL EVERY 6 HOURS PRN
Status: DISCONTINUED | OUTPATIENT
Start: 2021-03-19 | End: 2021-03-23 | Stop reason: HOSPADM

## 2021-03-19 RX ORDER — PROCHLORPERAZINE 25 MG
25 SUPPOSITORY, RECTAL RECTAL EVERY 12 HOURS PRN
Status: DISCONTINUED | OUTPATIENT
Start: 2021-03-19 | End: 2021-03-23 | Stop reason: HOSPADM

## 2021-03-19 RX ORDER — PANTOPRAZOLE SODIUM 40 MG/1
40 TABLET, DELAYED RELEASE ORAL DAILY
Status: DISCONTINUED | OUTPATIENT
Start: 2021-03-19 | End: 2021-03-23 | Stop reason: HOSPADM

## 2021-03-19 RX ORDER — POLYETHYLENE GLYCOL 3350 17 G/17G
17 POWDER, FOR SOLUTION ORAL 2 TIMES DAILY
Status: DISCONTINUED | OUTPATIENT
Start: 2021-03-19 | End: 2021-03-23 | Stop reason: HOSPADM

## 2021-03-19 RX ORDER — LIDOCAINE 40 MG/G
CREAM TOPICAL
Status: DISCONTINUED | OUTPATIENT
Start: 2021-03-19 | End: 2021-03-23 | Stop reason: HOSPADM

## 2021-03-19 RX ORDER — SUCRALFATE ORAL 1 G/10ML
1 SUSPENSION ORAL 4 TIMES DAILY PRN
Status: DISCONTINUED | OUTPATIENT
Start: 2021-03-19 | End: 2021-03-23 | Stop reason: HOSPADM

## 2021-03-19 RX ORDER — ALBUTEROL SULFATE 90 UG/1
1-2 AEROSOL, METERED RESPIRATORY (INHALATION) EVERY 4 HOURS PRN
Status: DISCONTINUED | OUTPATIENT
Start: 2021-03-19 | End: 2021-03-23 | Stop reason: HOSPADM

## 2021-03-19 RX ORDER — CARVEDILOL 6.25 MG/1
6.25 TABLET ORAL 2 TIMES DAILY WITH MEALS
Status: DISCONTINUED | OUTPATIENT
Start: 2021-03-19 | End: 2021-03-23 | Stop reason: HOSPADM

## 2021-03-19 RX ORDER — NICOTINE 21 MG/24HR
1 PATCH, TRANSDERMAL 24 HOURS TRANSDERMAL EVERY 24 HOURS
Status: DISCONTINUED | OUTPATIENT
Start: 2021-03-19 | End: 2021-03-23 | Stop reason: HOSPADM

## 2021-03-19 RX ORDER — LIDOCAINE 4 G/G
1 PATCH TOPICAL
Status: DISCONTINUED | OUTPATIENT
Start: 2021-03-19 | End: 2021-03-23 | Stop reason: HOSPADM

## 2021-03-19 RX ORDER — ONDANSETRON 2 MG/ML
4 INJECTION INTRAMUSCULAR; INTRAVENOUS EVERY 6 HOURS PRN
Status: DISCONTINUED | OUTPATIENT
Start: 2021-03-19 | End: 2021-03-23 | Stop reason: HOSPADM

## 2021-03-19 RX ORDER — POLYETHYLENE GLYCOL 3350 17 G/17G
1 POWDER, FOR SOLUTION ORAL 2 TIMES DAILY
COMMUNITY
End: 2021-08-11

## 2021-03-19 RX ORDER — FENTANYL 25 UG/1
25 PATCH TRANSDERMAL
Status: DISCONTINUED | OUTPATIENT
Start: 2021-03-19 | End: 2021-03-20

## 2021-03-19 RX ORDER — CLINDAMYCIN PHOSPHATE 900 MG/50ML
900 INJECTION, SOLUTION INTRAVENOUS
Status: COMPLETED | OUTPATIENT
Start: 2021-03-19 | End: 2021-03-19

## 2021-03-19 RX ADMIN — CEFEPIME 2 G: 2 INJECTION, POWDER, FOR SOLUTION INTRAVENOUS at 17:24

## 2021-03-19 RX ADMIN — ACETAMINOPHEN 500 MG: 325 SOLUTION ORAL at 22:54

## 2021-03-19 RX ADMIN — DIPHENHYDRAMINE HYDROCHLORIDE 25 MG: 50 INJECTION, SOLUTION INTRAMUSCULAR; INTRAVENOUS at 17:24

## 2021-03-19 RX ADMIN — CARVEDILOL 6.25 MG: 6.25 TABLET, FILM COATED ORAL at 19:37

## 2021-03-19 RX ADMIN — SODIUM CHLORIDE 1000 ML: 9 INJECTION, SOLUTION INTRAVENOUS at 18:42

## 2021-03-19 RX ADMIN — VANCOMYCIN HYDROCHLORIDE 1750 MG: 10 INJECTION, POWDER, LYOPHILIZED, FOR SOLUTION INTRAVENOUS at 18:19

## 2021-03-19 RX ADMIN — QUETIAPINE 25 MG: 25 TABLET ORAL at 23:44

## 2021-03-19 RX ADMIN — OXYCODONE HYDROCHLORIDE 10 MG: 5 SOLUTION ORAL at 17:50

## 2021-03-19 RX ADMIN — LIDOCAINE HYDROCHLORIDE 1 ML: 10 INJECTION, SOLUTION EPIDURAL; INFILTRATION; INTRACAUDAL; PERINEURAL at 14:28

## 2021-03-19 RX ADMIN — POTASSIUM CHLORIDE, DEXTROSE MONOHYDRATE AND SODIUM CHLORIDE: 150; 5; 900 INJECTION, SOLUTION INTRAVENOUS at 20:12

## 2021-03-19 RX ADMIN — CLINDAMYCIN IN 5 PERCENT DEXTROSE 900 MG: 18 INJECTION, SOLUTION INTRAVENOUS at 13:39

## 2021-03-19 RX ADMIN — PANTOPRAZOLE SODIUM 40 MG: 40 TABLET, DELAYED RELEASE ORAL at 19:37

## 2021-03-19 RX ADMIN — Medication 1 PATCH: at 19:35

## 2021-03-19 RX ADMIN — ONDANSETRON 4 MG: 2 INJECTION INTRAMUSCULAR; INTRAVENOUS at 23:04

## 2021-03-19 RX ADMIN — LORAZEPAM 1 MG: 2 LIQUID ORAL at 21:25

## 2021-03-19 RX ADMIN — OXYCODONE HYDROCHLORIDE 10 MG: 5 SOLUTION ORAL at 22:27

## 2021-03-19 ASSESSMENT — ACTIVITIES OF DAILY LIVING (ADL)
DRESSING/BATHING_DIFFICULTY: NO
DOING_ERRANDS_INDEPENDENTLY_DIFFICULTY: NO
FALL_HISTORY_WITHIN_LAST_SIX_MONTHS: NO
TOILETING_ISSUES: NO
EQUIPMENT_CURRENTLY_USED_AT_HOME: CANE, STRAIGHT
HEARING_DIFFICULTY_OR_DEAF: NO
WALKING_OR_CLIMBING_STAIRS_DIFFICULTY: NO
DIFFICULTY_EATING/SWALLOWING: NO
DIFFICULTY_COMMUNICATING: NO
CONCENTRATING,_REMEMBERING_OR_MAKING_DECISIONS_DIFFICULTY: NO
WEAR_GLASSES_OR_BLIND: NO

## 2021-03-19 NOTE — PHARMACY-ADMISSION MEDICATION HISTORY
Admission Medication History Completed by Pharmacy    See [facility] Admission Navigator for allergy information, preferred outpatient pharmacy, prior to admission medications and immunization status.     Medication History Sources:     Patient's wife Rose    Sure Scripts    Changes made to PTA medication list (reason):    Added: None    Deleted:   o Diphenhydramine 25 mg capsule - take 25 mg by mouth every 8 hours. 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 Remove old patch (duplicate entry, older order, per patient's wife patient uses every 48 hours)  o Multivitamin tablet - take 1 tablet by mouth daily (per patient's wife)  o Oxycodone 5 mg/5mL solution - take 5-10 mL (5-10 mg) by mouth every 4 hours as needed for severe pain (duplicate entry, older order)    Changed:    Miralax 17 g packet - take 1 packet by mouth daily --> take 1 packet by mouth two times daily (per patient's wife)   Additional Information:    Medication history was collected from the patient's wife Rose. She knew all of the patient's medication's names, strengths, frequencies, and the last time that each was taken.    The patient uses a fentanyl 25 mcg/hr patch. He currently has one placed on his back that was put there this morning, 3/19/2021. The patch gets removed and a new one placed every 48 hours.    The patient receives his TPN from [facility]. The patient's last TPN was infused this past Sunday, 3/14/2021.    The patient's wife reported his last cyanocobalamin injection was around a month ago and he is due for another one soon, exact date was unclear though.     The patient's wife denied that the patient was taking any other prescription or over the counter medications.     Prior to Admission medications    Medication Sig Last Dose Taking? Auth Provider   acetaminophen (TYLENOL) 32 mg/mL liquid  Take 15.65 mLs (500 mg) by mouth every 4 hours as needed for fever or mild pain Past Month Yes Chuy Isaac MD   alteplase 2 mg/2 mL (in 10 mL syringe) Inject 1 mg into the vein as needed 3/14/2021 Yes Reported, Patient   amphetamine-dextroamphetamine (ADDERALL) 20 MG tablet TAKE ONE TABLET BY MOUTH ONCE DAILY 3/18/2021 Yes Chuy Isaac MD   carvedilol (COREG) 6.25 MG tablet Take 6.25 mg by mouth 2 times daily (with meals) 3/19/2021 at AM Yes Unknown, Entered By History   cyanocobalamin (CYANOCOBALAMIN) 1000 MCG/ML injection Inject 1 mL (1,000 mcg) into the muscle every 30 days Past Month Yes Chuy Isaac MD   fentaNYL (DURAGESIC) 25 mcg/hr 72 hr patch Place 1 patch onto the skin every 48 hours remove old patch.   May fill 03/08/21 and start 03/10/21 3/19/2021 Yes Patti Milligan MD   Lidocaine (LIDOCARE) 4 % Patch Place 1 patch onto the skin every 24 hours To prevent lidocaine toxicity, patient should be patch free for 12 hrs daily. Past Month Yes Chuy Isaac MD   lidocaine 7.5 mL, alum & mag hydroxide-simethicone 7.5 mL GI Cocktail Take 15 mLs by mouth once as needed for mouth, throat or stomach pain Past Month Yes Chuy Isaac MD   LORazepam (ATIVAN) 2 MG/ML (HIGH CONC) solution Take 0.5 mLs (1 mg) by mouth nightly as needed for anxiety or sleep 3/18/2021 at PM Yes Chuy Isaac MD   nicotine (NICODERM CQ) 21 MG/24HR 24 hr patch Place 1 patch onto the skin every 24 hours 3/17/2021 Yes Chuy Isaac MD   nystatin (MYCOSTATIN) 743367 UNIT/GM external cream Apply topically 2 times daily 3/18/2021 Yes Carlos Doherty DO   ondansetron (ZOFRAN-ODT) 8 MG ODT tab DISSOLVE ONE TABLET ON TONGUE EVERY 8 HOURS AS NEEDED FOR NAUSEA 3/19/2021 at AM Yes Chuy Isaac MD   oxyCODONE (ROXICODONE) 5 MG/5ML solution Take 5-10 mLs (5-10 mg) by mouth 3 times daily as needed for pain Max of 30mg/day. Fill 03/08/21 to start on/after 03/10/21. 30 day supply. 3/18/2021 at PM Yes Patti Milligan  "MD Bree   pantoprazole (PROTONIX) 40 MG EC tablet Take 1 tablet (40 mg) by mouth daily 3/18/2021 Yes Chuy Isaac MD   parenteral nutrition - PTA/DISCHARGE ORDER Patient receives 2050 mL TPN every 14 hours through PICC at [facility]. 3/14/2021 Yes Unknown, Entered By History   polyethylene glycol (MIRALAX) 17 g packet Take 1 packet by mouth 2 times daily 3/18/2021 at PM Yes Unknown, Entered By History   sucralfate (CARAFATE) 1 GM/10ML suspension Take 10 mLs (1 g) by mouth 4 times daily as needed 3/18/2021 at PM Yes Chuy Isaac MD   VENTOLIN  (90 Base) MCG/ACT inhaler INHALE TWO PUFFS BY MOUTH EVERY 4 HOURS AS NEEDED FOR SHORTNESS OF BREATH /DYSPNEA OR WHEEZING 3/19/2021 at AM Yes Chuy Isaac MD   vitamin D2 (ERGOCALCIFEROL) 64791 units (1250 mcg) capsule Take 1 capsule (50,000 Units) by mouth once a week 3/14/2021 Yes Chuy Isaac MD   EPINEPHrine (ANY BX GENERIC EQUIV) 0.3 MG/0.3ML injection 2-pack    Reported, Patient   [facility] PATIENT Contact [facility] for patient specific medication information at [phone] on admission and discharge from the hospital.  Phones are answered 24 hours a day 7 days a week 365 days a year.    Providers - Choose \"CONTINUE HOME MED (no script)\" at discharge if patient treatment with home infusion will continue.   Ilsa Shine PA   naloxone (NARCAN) 4 MG/0.1ML nasal spray Spray 1 spray (4 mg) into one nostril alternating nostrils as needed for opioid reversal every 2-3 minutes until assistance arrives   Patti Milligan MD       Date completed: 03/19/21    Medication history completed by: Terry Winchester            "

## 2021-03-19 NOTE — ED TRIAGE NOTES
Patient brought by wheelchair from unit 2A after needing a cm catheter replaced. Cm was removed but it was unable to be placed since patient had a fever.

## 2021-03-19 NOTE — PROGRESS NOTES
This is a 50 year old male with PMH significant for Celestino-en-Y gastric bypass, esophagojejunostomy c/b short gut syndrome and chronic malnutrition on TPN, and history of recurrent line-related infections. IR last placed a DL silicone catheter on 2/5 for TPN/IV antibiotics. Patient called earlier this week and reported he cut his line and could not use it. He presented today for revision (removal and replacement). On pre-procedure unit he has a fever of 102.5F. He reports he recently took tylenol. COVID swab neg 3/17.    Discussed with Dr. Fitch from IR and ED triage provider. Pt will have line removed due to complete transection and nidus for infection and go to the ED for evaluation for infection. Team should work-up for bacteremia given history. ED provider/care team should update IR after evaluation so we can coordinate line replacement.    Patti Garvey DNP, APRN  Interventional Radiology   IR on-call pager: [phone]

## 2021-03-19 NOTE — PHARMACY-VANCOMYCIN DOSING SERVICE
Pharmacy Vancomycin Initial Note  Date of Service 2021  Patient's  1970  50 year old, male    Indication: Sepsis    Current estimated CrCl = Estimated Creatinine Clearance: 152.6 mL/min (based on SCr of 0.68 mg/dL).    Creatinine for last 3 days  3/19/2021:  4:43 PM Creatinine 0.68 mg/dL    Recent Vancomycin Level(s) for last 3 days  No results found for requested labs within last 72 hours.      Vancomycin IV Administrations (past 72 hours)                   vancomycin (VANCOCIN) 1,750 mg in sodium chloride 0.9 % 500 mL intermittent infusion (mg) 1,750 mg New Bag 21 1819              Nephrotoxins and other renal medications (From now, onward)    Start     Dose/Rate Route Frequency Ordered Stop    21 1705  vancomycin (VANCOCIN) 1,750 mg in sodium chloride 0.9 % 500 mL intermittent infusion      1,750 mg  over 3 Hours Intravenous EVERY 12 HOURS 21 1703          Contrast Orders - past 72 hours (72h ago, onward)    None           Plan:  1.  Start vancomycin  1750 mg IV q12h.   2.  Goal Trough Level: 15-20 mg/L   3.  Pharmacy will check trough levels as appropriate in 1-3 Days.    4. Serum creatinine levels will be ordered daily for the first week of therapy and at least twice weekly for subsequent weeks.    5. [place] method utilized to dose vancomycin therapy: Method 2    [place], PharmD, BCPS

## 2021-03-19 NOTE — PROGRESS NOTES
Pt with temperature of 102.5  Per IR plan is for tunneled line removal then will be sent directly to the Emergency Room.   Pt's spouse, Rose, notified and aware (her phone number, just for today, is [phone])

## 2021-03-19 NOTE — ED PROVIDER NOTES
ED Provider Note  [facility]      History     Chief Complaint   Patient presents with     Vascular Access Problem     Fever     HPI  Parker Acevedo is a 50 year old male who has a history of reflux, peptic ulcer disease, chronic abdominal pain, and severe malnutrition who has a Cm for TPN and hydration.  He was supposed to have a Cm revision today.  He was found to be febrile to 102, and the Cm catheter was removed and not replaced.  Patient feels fatigued and weak.  He also has a fever.  He denies nausea or vomiting.  He has no chest pain or shortness of breath.  He has no abdominal pain.  He has no rashes.  He denies cough.  He has no dysuria or urinary frequency.    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     Complication of anesthesia      Difficulty swallowing      Gastric ulcer, unspecified as acute or chronic, without mention of hemorrhage, perforation, or obstruction      Gastro-oesophageal reflux disease      Head injury      Hiatal hernia      Other bladder disorder      Other chronic pain      PONV (postoperative nausea and vomiting)      Severe malnutrition (H)     TPN     Short gut syndrome      Tobacco abuse      Past Surgical History:   Procedure Laterality Date     AMPUTATION       APPENDECTOMY       BACK SURGERY  11/3/2014    curve in the spine     BIOPSY LYMPH NODE CERVICAL N/A 2/20/2015    Procedure: BIOPSY LYMPH NODE CERVICAL;  Surgeon: Baron Scanlon MD;  Location: [facility]     C GASTRIC BYPASS,OBESE<100CM SHAYLEE-EN-Y  2002    lost 300 pounds     CHOLECYSTECTOMY       DISCECTOMY, FUSION CERVICAL ANTERIOR ONE LEVEL, COMBINED N/A 2/15/2017    Procedure: COMBINED  DISCECTOMY, FUSION CERVICAL ANTERIOR ONE LEVEL;  Surgeon: Darren Campos MD;  Location: [facility]     ENDOSCOPIC INSERTION TUBE GASTROSTOMY  9/9/2013    Procedure: ENDOSCOPIC INSERTION TUBE GASTROSTOMY;;  Surgeon: Francis Vyas MD;  [facility]     ENDOSCOPIC ULTRASOUND UPPER GASTROINTESTINAL TRACT (GI)  4/29/2011    Procedure:ENDOSCOPIC ULTRASOUND UPPER GASTROINTESTINAL TRACT (GI); Both Procedures done Conjointly; Surgeon:NEREIDA HOUSER; [facility]     ENDOSCOPIC ULTRASOUND UPPER GASTROINTESTINAL TRACT (GI)  9/9/2013    Procedure: ENDOSCOPIC ULTRASOUND UPPER GASTROINTESTINAL TRACT (GI);  Endoscopic Ultrasound Guide Gastrostomy Tube Placement  C-arm;  Surgeon: Noe Lizarraga MD;  [facility]     ENDOSCOPIC ULTRASOUND UPPER GASTROINTESTINAL TRACT (GI) N/A 2/24/2021    Procedure: ENDOSCOPIC ULTRASOUND, ESOPHAGOSCOPY / UPPER GASTROINTESTINAL TRACT (GI), esophagastrogastroduodenoscopy;  Surgeon: Berny Bach MD;  [facility]     ENDOSCOPY  03/25/11    EGD, [facility]     ENDOSCOPY  08/04/09    Upper Endoscopy, [facility]     ENDOSCOPY  01/05/09    Upper Endoscopy, [facility]     ESOPHAGOSCOPY, GASTROSCOPY, DUODENOSCOPY (EGD), COMBINED  4/20/2011    Procedure:COMBINED ESOPHAGOSCOPY, GASTROSCOPY, DUODENOSCOPY (EGD); Surgeon:FRANCIS VYAS; [facility]     ESOPHAGOSCOPY, GASTROSCOPY, DUODENOSCOPY (EGD), COMBINED  6/15/2011    Procedure:COMBINED ESOPHAGOSCOPY, GASTROSCOPY, DUODENOSCOPY (EGD); Surgeon:FRANCIS VYAS; [facility]     ESOPHAGOSCOPY, GASTROSCOPY, DUODENOSCOPY (EGD), COMBINED  6/12/2013    Procedure: COMBINED ESOPHAGOSCOPY, GASTROSCOPY, DUODENOSCOPY (EGD);;  Surgeon: Francis Vyas MD;  [facility]     ESOPHAGOSCOPY, GASTROSCOPY, DUODENOSCOPY (EGD), COMBINED  11/22/2013    Procedure: COMBINED ESOPHAGOSCOPY, GASTROSCOPY, DUODENOSCOPY (EGD);;  Surgeon: Francis Vyas MD;  [facility]     ESOPHAGOSCOPY,  GASTROSCOPY, DUODENOSCOPY (EGD), COMBINED  4/30/2014    Procedure: COMBINED ESOPHAGOSCOPY, GASTROSCOPY, DUODENOSCOPY (EGD);  Surgeon: Francis Vyas MD;  Location:  GI     ESOPHAGOSCOPY, GASTROSCOPY, DUODENOSCOPY (EGD), COMBINED N/A 2/20/2015    Procedure: COMBINED ESOPHAGOSCOPY, GASTROSCOPY, DUODENOSCOPY (EGD), BIOPSY SINGLE OR MULTIPLE;  Surgeon: Baron Scanlon MD;  [facility]     ESOPHAGOSCOPY, GASTROSCOPY, DUODENOSCOPY (EGD), COMBINED N/A 9/30/2015    Procedure: COMBINED ESOPHAGOSCOPY, GASTROSCOPY, DUODENOSCOPY (EGD);  Surgeon: Francis Vyas MD;  Location:  GI     ESOPHAGOSCOPY, GASTROSCOPY, DUODENOSCOPY (EGD), COMBINED N/A 10/3/2019    Procedure: Upper Endoscopy;  Surgeon: Clif Morrow MD;  [facility]     GASTRECTOMY  6/22/2012    Procedure: GASTRECTOMY;  Open Approach, Excise Ulcers,Partial Gastrectomy, Esophagojejunostomy, Hiatal Hernia Repair, Extensive Lysis of Adhesions and Esaphagogastrodudenoscopy.;  Surgeon: Francis Vyas MD;  [facility]     GASTROJEJUNOSTOMY  08/26/09    Extensice enterolysis, partial resect. jejunum, part. resect gastric pouch, gastrojejunostomy anastomosis     HC ESOPH/GAS REFLUX TEST W NASAL IMPED ELECTRODE  8/5/2013    Procedure: ESOPHAGEAL IMPEDENCE FUNCTION TEST 1 HOUR OR LESS;  Surgeon: Halie Lang MD;  Location:  GI     HEAD & NECK SURGERY  2/15/2017    C5-C6     HERNIA REPAIR  2006    Umbilical hernia     HERNIORRHAPHY HIATAL  6/22/2012    Procedure: HERNIORRHAPHY HIATAL;;  Surgeon: Francis Vyas MD;  [facility]     HERNIORRHAPHY INGUINAL  11/22/2013    Procedure: HERNIORRHAPHY INGUINAL;;  Surgeon: Francis Vyas MD;  [facility]     INSERT PICC LINE Right 12/19/2019    Procedure: Picc Placement;  Surgeon: Per Dumont PA-C;  [facility]     INSERT PICC LINE Right 2/21/2020    Procedure: INSERTION, PICC;  Surgeon: Per Dumont PA-C;  [facility]     INSERT PORT  VASCULAR ACCESS Right 12/19/2017    Procedure: INSERT PORT VASCULAR ACCESS;  Right Chest Port Placement ;  Surgeon: Lisandro Alejandro PA-C;  [facility]     INSERT PORT VASCULAR ACCESS Right 8/2/2018    Procedure: INSERT PORT VASCULAR ACCESS;  Place single lumen tunneled central venous access catheter;  Surgeon: Guy Jamil PA-C;  [facility]     IR CVC TUNNEL PLACEMENT > 5 YRS OF AGE  8/7/2019     IR CVC TUNNEL PLACEMENT > 5 YRS OF AGE  4/14/2020     IR CVC TUNNEL PLACEMENT > 5 YRS OF AGE  8/3/2020     IR CVC TUNNEL PLACEMENT > 5 YRS OF AGE  9/4/2020     IR CVC TUNNEL PLACEMENT > 5 YRS OF AGE  2/5/2021     IR CVC TUNNEL REMOVAL RIGHT  10/1/2019     IR CVC TUNNEL REMOVAL RIGHT  7/30/2020     IR CVC TUNNEL REMOVAL RIGHT  9/2/2020     IR CVC TUNNEL REMOVAL RIGHT  2/3/2021     IR CVC TUNNEL REMOVAL RIGHT  3/19/2021     IR FOLLOW UP VISIT OUTPATIENT  8/7/2019     IR PICC PLACEMENT > 5 YRS OF AGE  3/7/2019     IR PICC PLACEMENT > 5 YRS OF AGE  12/19/2019     IR PICC PLACEMENT > 5 YRS OF AGE  2/21/2020     LAPAROTOMY EXPLORATORY  11/22/2013    Procedure: LAPAROTOMY EXPLORATORY;  Exploratory Laparotomy, Upper Endoscopy, Left Inguinal Hernia Repair;  Surgeon: Francis Vyas MD;  [facility]     ORTHOPEDIC SURGERY       PICC INSERTION Right 03/16/2017    5fr DL BioFlo PICC, 42cm (3cm external) in the R medial brachial vein w/ tip in the SVC RA junction.     PICC INSERTION Left 09/23/2017    5fr DL BioFlo PICC, 45cm (1cm external) in the L basilic vein w/ tip in the SVC RA junction.     PICC INSERTION Right 05/16/2019    5Fr - 43cm, Medial brachial vein, low SVC     PICC INSERTION Right 10/02/2019    5Fr - 43cm (2cm external), basilic vein, low SVC     SHAYLEE EN Y BOWEL  2003     SOFT TISSUE SURGERY       THORACIC SURGERY       TONSILLECTOMY       TRANSESOPHAGEAL ECHOCARDIOGRAM INTRAOPERATIVE N/A 1/8/2019    Procedure: TRANSESOPHAGEAL ECHOCARDIOGRAM INTRAOPERATIVE;  Surgeon: JEREL  ANESTHESIA PROVIDER;  [facility]     acetaminophen (TYLENOL) 32 mg/mL liquid  alteplase 2 mg/2 mL (in 10 mL syringe)  amphetamine-dextroamphetamine (ADDERALL) 20 MG tablet  carvedilol (COREG) 6.25 MG tablet  cyanocobalamin (CYANOCOBALAMIN) 1000 MCG/ML injection  fentaNYL (DURAGESIC) 25 mcg/hr 72 hr patch  Lidocaine (LIDOCARE) 4 % Patch  lidocaine 7.5 mL, alum & mag hydroxide-simethicone 7.5 mL GI Cocktail  LORazepam (ATIVAN) 2 MG/ML (HIGH CONC) solution  nicotine (NICODERM CQ) 21 MG/24HR 24 hr patch  nystatin (MYCOSTATIN) 842927 UNIT/GM external cream  ondansetron (ZOFRAN-ODT) 8 MG ODT tab  oxyCODONE (ROXICODONE) 5 MG/5ML solution  pantoprazole (PROTONIX) 40 MG EC tablet  parenteral nutrition - PTA/DISCHARGE ORDER  polyethylene glycol (MIRALAX) 17 g packet  sucralfate (CARAFATE) 1 GM/10ML suspension  VENTOLIN  (90 Base) MCG/ACT inhaler  vitamin D2 (ERGOCALCIFEROL) 16804 units (1250 mcg) capsule  EPINEPHrine (ANY BX GENERIC EQUIV) 0.3 MG/0.3ML injection 2-pack  [place] [facility]NAGED PATIENT  naloxone (NARCAN) 4 MG/0.1ML nasal spray      Allergies   Allergen Reactions     Bactrim [Sulfamethoxazole W/Trimethoprim] Rash     Penicillins Anaphylaxis     Please see Antimicrobial Management Team allergy assessment note 10/10/2018. Patient reported tolerating amoxicillin.     Doxycycline Rash     Vancomycin Rash     Rash after receiving vancomycin 3/28/16 (red man's?). Tolerated with slower infusion and diphenhydramine premed.     Family History  Family History   Problem Relation Age of Onset     Gastrointestinal Disease Mother         Crohns disease     Anxiety Disorder Mother      Thyroid Disease Mother         Grave's disease     Cancer Father         ear cancer-skin cancer/melanoma     Breast Cancer Maternal Grandmother      Macular Degeneration Maternal Grandfather      Anxiety Disorder Sister      Diabetes Maternal Uncle      Breast Cancer Other      Hypertension No family hx of      Hyperlipidemia  No family hx of      Cerebrovascular Disease No family hx of      Prostate Cancer No family hx of      Depression No family hx of      Anesthesia Reaction No family hx of      Asthma No family hx of      Osteoporosis No family hx of      Genetic Disorder No family hx of      Obesity No family hx of      Mental Illness No family hx of      Substance Abuse No family hx of      Glaucoma No family hx of      Social History   Social History     Tobacco Use     Smoking status: Current Some Day Smoker     Types: Cigarettes     Smokeless tobacco: Never Used     Tobacco comment: 2/4/2021    smokes 3 cigarettes/day   Substance Use Topics     Alcohol use: No     Frequency: Never     Drinks per session: Patient refused     Binge frequency: Never     Comment: quit 2002     Drug use: No      Past medical history, past surgical history, medications, allergies, family history, and social history were reviewed with the patient. No additional pertinent items.       Review of Systems  A complete review of systems was performed with pertinent positives and negatives noted in the HPI, and all other systems negative.    Physical Exam   BP: 130/70  Pulse: 90  Temp: 102  F (38.9  C)  Resp: 16  Weight: 81.2 kg (179 lb)  SpO2: 96 %  Physical Exam  Vitals signs and nursing note reviewed.   Constitutional:       General: He is not in acute distress.     Appearance: He is well-developed. He is ill-appearing. He is not diaphoretic.   HENT:      Head: Normocephalic and atraumatic.   Eyes:      General: No scleral icterus.     Pupils: Pupils are equal, round, and reactive to light.   Neck:      Musculoskeletal: Normal range of motion and neck supple.   Cardiovascular:      Rate and Rhythm: Normal rate and regular rhythm.      Heart sounds: Normal heart sounds. No murmur.   Pulmonary:      Effort: No respiratory distress.      Breath sounds: No stridor. No wheezing or rales.   Abdominal:      General: Bowel sounds are normal.      Palpations:  Abdomen is soft.      Tenderness: There is no abdominal tenderness.   Musculoskeletal:         General: No tenderness.   Skin:     General: Skin is warm and dry.      Coloration: Skin is not pale.      Findings: No erythema or rash.   Neurological:      Mental Status: He is alert and oriented to person, place, and time.      Sensory: No sensory deficit.      Coordination: Coordination normal.         ED Course      Procedures           Results for orders placed or performed during the hospital encounter of 03/19/21   XR Chest 2 Views     Status: None (Preliminary result)    Narrative    PRELIMINARY REPORT - The following report is a preliminary  interpretation.      Impression    Impression: No acute airspace disease.   CBC with platelets differential     Status: Abnormal (In process)   Result Value Ref Range    WBC 6.6 4.0 - 11.0 10e9/L    RBC Count 4.49 4.4 - 5.9 10e12/L    Hemoglobin 14.0 13.3 - 17.7 g/dL    Hematocrit 41.8 40.0 - 53.0 %    MCV 93 78 - 100 fl    MCH 31.2 26.5 - 33.0 pg    MCHC 33.5 31.5 - 36.5 g/dL    RDW 15.8 (H) 10.0 - 15.0 %    Platelet Count 147 (L) 150 - 450 10e9/L    Diff Method PENDING    Lactic acid whole blood     Status: None   Result Value Ref Range    Lactic Acid 0.9 0.7 - 2.0 mmol/L   Comprehensive metabolic panel     Status: Abnormal (In process)   Result Value Ref Range    Sodium 140 133 - 144 mmol/L    Potassium 3.2 (L) 3.4 - 5.3 mmol/L    Chloride 106 94 - 109 mmol/L    Carbon Dioxide 26 20 - 32 mmol/L    Anion Gap PENDING 3 - 14 mmol/L    Glucose 97 70 - 99 mg/dL    Urea Nitrogen 8 7 - 30 mg/dL    Creatinine 0.68 0.66 - 1.25 mg/dL    GFR Estimate PENDING >60 mL/min/[1.73_m2]    GFR Estimate If Black PENDING >60 mL/min/[1.73_m2]    Calcium 8.7 8.5 - 10.1 mg/dL    Bilirubin Total 0.5 0.2 - 1.3 mg/dL    Albumin 3.5 3.4 - 5.0 g/dL    Protein Total 7.0 6.8 - 8.8 g/dL    Alkaline Phosphatase 73 40 - 150 U/L    ALT 26 0 - 70 U/L    AST 12 0 - 45 U/L   UA reflex to Microscopic and  Culture     Status: Abnormal    Specimen: Urine clean catch   Result Value Ref Range    Color Urine Yellow     Appearance Urine Clear     Glucose Urine Negative NEG^Negative mg/dL    Bilirubin Urine Negative NEG^Negative    Ketones Urine 60 (A) NEG^Negative mg/dL    Specific Gravity Urine 1.024 1.003 - 1.035    Blood Urine Small (A) NEG^Negative    pH Urine 7.5 (H) 5.0 - 7.0 pH    Protein Albumin Urine 20 (A) NEG^Negative mg/dL    Urobilinogen mg/dL Normal 0.0 - 2.0 mg/dL    Nitrite Urine Negative NEG^Negative    Leukocyte Esterase Urine Negative NEG^Negative    Source Urine     RBC Urine 23 (H) 0 - 2 /HPF    WBC Urine 1 0 - 5 /HPF    Squamous Epithelial /HPF Urine 0 0 - 1 /HPF    Mucous Urine Present (A) NEG^Negative /LPF   Results for orders placed or performed during the hospital encounter of 03/19/21   IR CVC Tunnel Removal Right     Status: None    Narrative    PRE-PROCEDURE DIAGNOSIS: Catheter transected    POST-PROCEDURE DIAGNOSIS: Same    PROCEDURE: Removal of tunneled central venous catheter    Impression    IMPRESSION: Completed removal of tunneled central venous catheter.  There were no complications. Patient was originally scheduled for  tunneled central venous catheter revision. This was deferred due to  high fever. Damage tunneled central venous catheter was removed.    ----------    CLINICAL HISTORY: The patient has a tunneled central venous catheter.  Patient is scheduled for tunneled central venous catheter revision due  to damage to existing tunneled central venous catheter. On  presentation to the hospital patient was found to have a fever 102  after taking Tylenol.    PERFORMED BY: Per Dumont PA-C    MEDICATIONS: 1% lidocaine without epinephrine was available for local  anesthesia. No intravenous medications for sedation were utilized.    DESCRIPTION: Physical examination demonstrated no erythema,  tenderness, fluctuance, or discharge at the catheter exit site or  along the tract. The catheter  and its entry site into the skin was  prepped and draped in usual sterile fashion.     Lidocaine mixture and blunt dissection were used around the catheter  and subcutaneous cuff.      Using steady gentle traction, the catheter and cuff were freed from  the subcutaneous tissue, and the entire catheter was removed without  difficulty. Compression was applied over the venipuncture site, as  well as along the tract, until good hemostasis was achieved. A sterile  dressing was applied to the skin at the exit site.    COMPLICATIONS:  No immediate concerns; the patient remained stable  throughout the procedure and tolerated it well.    ESTIMATED BLOOD LOSS:  Minimal    SPECIMENS:  Catheter removed per above    TIME:  A total of 15 minutes was spent with the patient. Greater than  50% of the time was spent in counseling, education, and coordination  of care.    --------    INSTRUCTIONS GIVEN TO PATIENT:  Keep site clean, dry, and covered for  72 hours.  Change the dressing if it becomes soiled, wet, or blood  soaked.  After 72 hours the dressing may be removed and the site may  be left uncovered and may get wet if the site has sealed.  If the site  has not sealed, keep it covered and dry with daily dressing changes  until sealed.  Monitor the neck over the collar bone for swelling and  the chest exit site for bleeding or infection as instructed.   Telephone Interventional Radiology daytime [phone], or [facility]   after hours [phone] (and ask for IR on-call), if  problems or concerns develop.  If the site bleeds, sit upright and  hold pressure on the site and above the collar bone for 10 minutes.   If it continues to bleed, continue holding pressure and telephone  Interventional Radiology at the number(s) above.    REECE CARRIZALES PA-C   Anaerobic catheter tip     Status: None (Preliminary result)    Specimen: Catheter tip   Result Value Ref Range    Specimen Description Catheter tip     Special Requests Received  in anaerobic tubes.     Culture Micro PENDING           Assessments & Plan (with Medical Decision Making)   Patient is a 50-year-old male with a history of chronic abdominal pain and severe malnutrition who had his Cm catheter removed today.  It was scheduled to be replaced, however he developed a fever and he was referred to the emergency department for further evaluation.  Patient denies any cough or sore throat.  There are no rashes.  He has no dysuria.  There is no obvious source of infection, which is concerning for the potential line infection from his Mc catheter.  IV was established, and bloods were obtained.    On arrival, patient blood pressure is 130/70.  His temperature was 102.  His pulse rate was 90.    Patient's white count was 6.6 with a hemoglobin of 14.0.  Potassium was slightly low at 3.2.  Urinalysis showed a specific gravity 1.024 was 60 ketones and 23 red cells.  A urine culture was obtained.  Blood cultures were also obtained, patient was started on IV antibiotics.  Chest x-ray showed no evidence of pneumothorax or airspace abnormality.  His Covid test from 2 days ago was negative.    Patient will be admitted to the hospital for IV antibiotics pending culture results and is at high risk for bacteremia.  He will be admitted to the hospitalist service at [facility].    I have reviewed the nursing notes. I have reviewed the findings, diagnosis, plan and need for follow up with the patient.    New Prescriptions    No medications on file       Final diagnoses:   Fever, unknown origin       --  Ab Barrett  [facility] EMERGENCY DEPARTMENT  3/19/2021     Ab Barrett MD  03/20/21 [id number]

## 2021-03-19 NOTE — PROCEDURES
Interventional Radiology Brief Post Procedure Note    Procedure: IR CVC TUNNEL REMOVAL RIGHT    Proceduralist: Per Dumont PA-C    Assistant: None    Time Out: Prior to the start of the procedure and with procedural staff participation, I verbally confirmed the patient s identity using two indicators, relevant allergies, that the procedure was appropriate and matched the consent or emergent situation, and that the correct equipment/implants were available. Immediately prior to starting the procedure I conducted the Time Out with the procedural staff and re-confirmed the patient s name, procedure, and site/side. (The Joint Commission universal protocol was followed.)  Yes    Medications   Medication Event Details Admin User Admin Time       Sedation: None. Local Anesthestic used    Findings: Completed removal of 10 [place] double lumen tunneled central venous catheter via right IJ. Catheter removed in its entirety. Patient tolerated procedure well. No immediate complication. Patient presented today for CVC revision but was found to be febrile at 102 degrees after tylenol. Damaged line was removed - new line placement deferred - patient sent to ER to determine source of infection.       Estimated Blood Loss: Minimal    Fluoroscopy Time: 0 minute(s)    SPECIMENS: Catheter tip for culture    Complications: 1. None     Condition: Stable    Plan: Follow-up per primary team.     Comments: See dictated procedure note for full details.    Per Dumont PA-C

## 2021-03-19 NOTE — ED NOTES
Bed: IN02  Expected date:   Expected time:   Means of arrival:   Comments:  Parker Acevedo MR#[id number] 50 yom TPN dependenct h/p multiple central lines with infection presents to IR with broken central line and fever 102 ETA 2 p from IR     Barak Case MD  03/19/21 0043

## 2021-03-19 NOTE — H&P
[facility]    History and Physical - Hospitalist Service, Gold Cross Cover       Date of Admission:  3/19/2021    Assessment & Plan   Parker Acevedo is a 50 year old male with a past medical history of Celestino-en-Y gastric bypass, esophagojejunostomy c/b short gut syndrome, chronic abdominal pain, severe malnutrition on TPN, recurrent line infections, HTN, PUD, and GERD who presents with fever during revision of his Cm CVC by IR. He is now admitted to Internal Medicine for further treatment and monitoring.    Fever of Unknown Origin  Hx of Recurrent Line Infections  Hx of Recurrent Polymicrobial Bacteremia - Patient presents with fever during pre-procedure evaluation for Cm line removal and revision. Catheter removed as below. Patient with history of recurrent line infections, with most recent admission 1/29/21 - 2/5/21. Blood cultures at that time positive for E. Faecalis, staph hominis, staph capitis, staph epidermidis, corynebacterium, and aerococcus.  Concerns for recurrent bacteremia 2/2 line infection vs other infectious etiology. COVID 19 negative 3/17.   -Continue empiric antibiotics with PTD vancomycin and cefepime 2g Q8H   -Cm catheter and blood cultures pending  -Obtain urinalysis with culture  -Add on chest x-ray to rule out pulmonary source of fever.   -Consider ID consultation pending culture results.     Hx of Celestino-en-Y Gastric Bypass  Esophagojejunostomy c/b Short Gut Syndrome  Severe Malnutrition on TPN   Chronic Abdominal Pain - Most recent catheter placed on 2/5 by IR.  Patient notified IR that earlier this week he cut his line and is unable to use it. Plans initially for revision however patient febrile on presentation as above.  His Cm has since been removed. Currently receives 2050mL TPN Q14H through [facility].  -Start D5 0.9% NS at 100cc/hr  -Will need eventual Cm CVC replacement once clear of  "fever/infection  -Continue PTA fentanyl 25mcg patch Q48H and oxycodone 5-10mLs TID PRN       HTN - Continue PTA carvedilol 6.25mg BID    ADHD - Continue PTA Adderall 20mg daily    GERD - Continue PTA pantoprazole 40mg daily    Asthma - Continue PTA albuterol inhaler Q4H PRN         Diet:  Regular Adult Diet  DVT Prophylaxis: Pneumatic Compression Devices  Sanchez Catheter: not present  Code Status:  Full Code Status         Disposition Plan   Expected discharge: 2 - 3 days, recommended to prior living arrangement once antibiotic plan established.  Entered: Teressa Sears PA-C 03/19/2021, 5:36 PM     The patient's care was discussed with the Attending Physician, Dr. Francis Torres.    Teressa Sears PA-C  [facility]  Contact information available via [facility] Paging/Directory  Please see sign in/sign out for up to date coverage information    ______________________________________________________________________    Chief Complaint   Fevers    History is obtained from the patient and wife at bedside.     History of Present Illness   Parker Acevedo is a 50 year old male with a PMH as listed above who presents to the ED with fevers. Patient states he accidentally cut his CVC catheter while helping his son with a project on Monday 3/15. He states as the days have progressed, he has felt fatigued and \"not good.\" He admits to a fever of 101F yesterday evening.  He was found to have a fever of 102F during his pre-procedure evaluation in IR.  Wife expressed concern her  may also have aspirated this week as well.   Patient denies chest pain, palpitations, SOB, cough, sputum production, nausea, vomiting, change in bowel or urinary habits.     Review of Systems    The 10 point Review of Systems is negative other than noted in the HPI or here.     Past Medical History    I have reviewed this patient's medical history and updated it with pertinent information if needed.   Past " Medical History:   Diagnosis Date     ADHD (attention deficit hyperactivity disorder)      Anxiety      Cardiomyopathy in nutritional diseases (H)     mild EF ~45% on rest 2/13/17, improves with stressing     Chronic abdominal pain      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     Complication of anesthesia      Difficulty swallowing      Gastric ulcer, unspecified as acute or chronic, without mention of hemorrhage, perforation, or obstruction      Gastro-oesophageal reflux disease      Head injury      Hiatal hernia      Other bladder disorder      Other chronic pain      PONV (postoperative nausea and vomiting)      Severe malnutrition (H)     TPN     Short gut syndrome      Tobacco abuse        Past Surgical History   I have reviewed this patient's surgical history and updated it with pertinent information if needed.  Past Surgical History:   Procedure Laterality Date     AMPUTATION       APPENDECTOMY       BACK SURGERY  11/3/2014    curve in the spine     BIOPSY LYMPH NODE CERVICAL N/A 2/20/2015    Procedure: BIOPSY LYMPH NODE CERVICAL;  Surgeon: Baron Scanlon MD;  Location: [facility] OR     C GASTRIC BYPASS,OBESE<100CM SHAYLEE-EN-Y  2002    lost 300 pounds     CHOLECYSTECTOMY       DISCECTOMY, FUSION CERVICAL ANTERIOR ONE LEVEL, COMBINED N/A 2/15/2017    Procedure: COMBINED DISCECTOMY, FUSION CERVICAL ANTERIOR ONE LEVEL;  Surgeon: Darren Campos MD;  Location: [facility] OR     ENDOSCOPIC INSERTION TUBE GASTROSTOMY  9/9/2013    Procedure: ENDOSCOPIC INSERTION TUBE GASTROSTOMY;;  Surgeon: Francis Vyas MD;  [facility]     ENDOSCOPIC ULTRASOUND UPPER GASTROINTESTINAL TRACT (GI)  4/29/2011    Procedure:ENDOSCOPIC ULTRASOUND UPPER GASTROINTESTINAL TRACT (GI); Both Procedures done Conjointly; Surgeon:NEREIDA HOUSER; [facility]     ENDOSCOPIC ULTRASOUND UPPER GASTROINTESTINAL TRACT (GI)  9/9/2013    Procedure: ENDOSCOPIC ULTRASOUND UPPER GASTROINTESTINAL TRACT (GI);   Endoscopic Ultrasound Guide Gastrostomy Tube Placement  C-arm;  Surgeon: Noe Lizarraga MD;  [facility]     ENDOSCOPIC ULTRASOUND UPPER GASTROINTESTINAL TRACT (GI) N/A 2/24/2021    Procedure: ENDOSCOPIC ULTRASOUND, ESOPHAGOSCOPY / UPPER GASTROINTESTINAL TRACT (GI), esophagastrogastroduodenoscopy;  Surgeon: Berny Bach MD;  [facility]     ENDOSCOPY  03/25/11    EGD, [facility]     ENDOSCOPY  08/04/09    Upper Endoscopy, [facility]     ENDOSCOPY  01/05/09    Upper Endoscopy, [facility]     ESOPHAGOSCOPY, GASTROSCOPY, DUODENOSCOPY (EGD), COMBINED  4/20/2011    Procedure:COMBINED ESOPHAGOSCOPY, GASTROSCOPY, DUODENOSCOPY (EGD); Surgeon:BLU VYAS; [facility]     ESOPHAGOSCOPY, GASTROSCOPY, DUODENOSCOPY (EGD), COMBINED  6/15/2011    Procedure:COMBINED ESOPHAGOSCOPY, GASTROSCOPY, DUODENOSCOPY (EGD); Surgeon:BLU VYAS; [facility]     ESOPHAGOSCOPY, GASTROSCOPY, DUODENOSCOPY (EGD), COMBINED  6/12/2013    Procedure: COMBINED ESOPHAGOSCOPY, GASTROSCOPY, DUODENOSCOPY (EGD);;  Surgeon: Blu Vyas MD;  [facility]     ESOPHAGOSCOPY, GASTROSCOPY, DUODENOSCOPY (EGD), COMBINED  11/22/2013    Procedure: COMBINED ESOPHAGOSCOPY, GASTROSCOPY, DUODENOSCOPY (EGD);;  Surgeon: Blu Vyas MD;  [facility]     ESOPHAGOSCOPY, GASTROSCOPY, DUODENOSCOPY (EGD), COMBINED  4/30/2014    Procedure: COMBINED ESOPHAGOSCOPY, GASTROSCOPY, DUODENOSCOPY (EGD);  Surgeon: Blu Vyas MD;  [facility]     ESOPHAGOSCOPY, GASTROSCOPY, DUODENOSCOPY (EGD), COMBINED N/A 2/20/2015    Procedure: COMBINED ESOPHAGOSCOPY, GASTROSCOPY, DUODENOSCOPY (EGD), BIOPSY SINGLE OR MULTIPLE;  Surgeon: Baron Scanlon MD;  Location:  OR     ESOPHAGOSCOPY, GASTROSCOPY, DUODENOSCOPY (EGD), COMBINED N/A 9/30/2015    Procedure: COMBINED ESOPHAGOSCOPY, GASTROSCOPY, DUODENOSCOPY (EGD);  Surgeon: Blu Vyas MD;  Location:  GI     ESOPHAGOSCOPY,  GASTROSCOPY, DUODENOSCOPY (EGD), COMBINED N/A 10/3/2019    Procedure: Upper Endoscopy;  Surgeon: Clif Morrow MD;  [facility]     GASTRECTOMY  6/22/2012    Procedure: GASTRECTOMY;  Open Approach, Excise Ulcers,Partial Gastrectomy, Esophagojejunostomy, Hiatal Hernia Repair, Extensive Lysis of Adhesions and Esaphagogastrodudenoscopy.;  Surgeon: Francis Vyas MD;  [facility]     GASTROJEJUNOSTOMY  08/26/09    Extensice enterolysis, partial resect. jejunum, part. resect gastric pouch, gastrojejunostomy anastomosis     HC ESOPH/GAS REFLUX TEST W NASAL IMPED ELECTRODE  8/5/2013    Procedure: ESOPHAGEAL IMPEDENCE FUNCTION TEST 1 HOUR OR LESS;  Surgeon: Halie Lang MD;  [facility]     HEAD & NECK SURGERY  2/15/2017    C5-C6     HERNIA REPAIR  2006    Umbilical hernia     HERNIORRHAPHY HIATAL  6/22/2012    Procedure: HERNIORRHAPHY HIATAL;;  Surgeon: Francis Vyas MD;  [facility]     HERNIORRHAPHY INGUINAL  11/22/2013    Procedure: HERNIORRHAPHY INGUINAL;;  Surgeon: Francis Vyas MD;  [facility]     INSERT PICC LINE Right 12/19/2019    Procedure: Picc Placement;  Surgeon: Per Dumont PA-C;  [facility]     INSERT PICC LINE Right 2/21/2020    Procedure: INSERTION, PICC;  Surgeon: Per Dumont PA-C;  [facility]     INSERT PORT VASCULAR ACCESS Right 12/19/2017    Procedure: INSERT PORT VASCULAR ACCESS;  Right Chest Port Placement ;  Surgeon: Lisandro Alejandro PA-C;  [facility]     INSERT PORT VASCULAR ACCESS Right 8/2/2018    Procedure: INSERT PORT VASCULAR ACCESS;  Place single lumen tunneled central venous access catheter;  Surgeon: Guy Jamil PA-C;  [facility]     IR CVC TUNNEL PLACEMENT > 5 YRS OF AGE  8/7/2019     IR CVC TUNNEL PLACEMENT > 5 YRS OF AGE  4/14/2020     IR CVC TUNNEL PLACEMENT > 5 YRS OF AGE  8/3/2020     IR CVC TUNNEL PLACEMENT > 5 YRS OF AGE  9/4/2020     IR CVC TUNNEL PLACEMENT > 5 YRS OF  AGE  2/5/2021     IR CVC TUNNEL REMOVAL RIGHT  10/1/2019     IR CVC TUNNEL REMOVAL RIGHT  7/30/2020     IR CVC TUNNEL REMOVAL RIGHT  9/2/2020     IR CVC TUNNEL REMOVAL RIGHT  2/3/2021     IR CVC TUNNEL REMOVAL RIGHT  3/19/2021     IR FOLLOW UP VISIT OUTPATIENT  8/7/2019     IR PICC PLACEMENT > 5 YRS OF AGE  3/7/2019     IR PICC PLACEMENT > 5 YRS OF AGE  12/19/2019     IR PICC PLACEMENT > 5 YRS OF AGE  2/21/2020     LAPAROTOMY EXPLORATORY  11/22/2013    Procedure: LAPAROTOMY EXPLORATORY;  Exploratory Laparotomy, Upper Endoscopy, Left Inguinal Hernia Repair;  Surgeon: Francis Vyas MD;  [facility]     ORTHOPEDIC SURGERY       PICC INSERTION Right 03/16/2017    5fr DL BioFlo PICC, 42cm (3cm external) in the R medial brachial vein w/ tip in the SVC RA junction.     PICC INSERTION Left 09/23/2017    5fr DL BioFlo PICC, 45cm (1cm external) in the L basilic vein w/ tip in the SVC RA junction.     PICC INSERTION Right 05/16/2019    5Fr - 43cm, Medial brachial vein, low SVC     PICC INSERTION Right 10/02/2019    5Fr - 43cm (2cm external), basilic vein, low SVC     SHAYLEE EN Y BOWEL  2003     SOFT TISSUE SURGERY       THORACIC SURGERY       TONSILLECTOMY       TRANSESOPHAGEAL ECHOCARDIOGRAM INTRAOPERATIVE N/A 1/8/2019    Procedure: TRANSESOPHAGEAL ECHOCARDIOGRAM INTRAOPERATIVE;  Surgeon: GENERIC ANESTHESIA PROVIDER;  [facility]       Social History   I have reviewed this patient's social history and updated it with pertinent information if needed.  Social History     Tobacco Use     Smoking status: Current Some Day Smoker     Types: Cigarettes     Smokeless tobacco: Never Used     Tobacco comment: 2/4/2021    smokes 3 cigarettes/day   Substance Use Topics     Alcohol use: No     Frequency: Never     Drinks per session: Patient refused     Binge frequency: Never     Comment: quit 2002     Drug use: No       Family History   I have reviewed this patient's family history and updated it with pertinent information  "if needed.  Family History   Problem Relation Age of Onset     Gastrointestinal Disease Mother         Crohns disease     Anxiety Disorder Mother      Thyroid Disease Mother         Grave's disease     Cancer Father         ear cancer-skin cancer/melanoma     Breast Cancer Maternal Grandmother      Macular Degeneration Maternal Grandfather      Anxiety Disorder Sister      Diabetes Maternal Uncle      Breast Cancer Other      Hypertension No family hx of      Hyperlipidemia No family hx of      Cerebrovascular Disease No family hx of      Prostate Cancer No family hx of      Depression No family hx of      Anesthesia Reaction No family hx of      Asthma No family hx of      Osteoporosis No family hx of      Genetic Disorder No family hx of      Obesity No family hx of      Mental Illness No family hx of      Substance Abuse No family hx of      Glaucoma No family hx of        Prior to Admission Medications   Prior to Admission Medications   Prescriptions Last Dose Informant Patient Reported? Taking?   EPINEPHrine (ANY BX GENERIC EQUIV) 0.3 MG/0.3ML injection 2-pack   Yes No   [facility]NAGED PATIENT  Self No No   Sig: Contact [facility] for patient specific medication information at [phone] on admission and discharge from the hospital.  Phones are answered 24 hours a day 7 days a week 365 days a year.    Providers - Choose \"CONTINUE HOME MED (no script)\" at discharge if patient treatment with home infusion will continue.   LORazepam (ATIVAN) 2 MG/ML (HIGH CONC) solution   No No   Sig: Take 0.5 mLs (1 mg) by mouth nightly as needed for anxiety or sleep   Lidocaine (LIDOCARE) 4 % Patch   Yes No   Sig: Place 1 patch onto the skin every 24 hours To prevent lidocaine toxicity, patient should be patch free for 12 hrs daily.   VENTOLIN  (90 Base) MCG/ACT inhaler   No No   Sig: INHALE TWO PUFFS BY MOUTH EVERY 4 HOURS AS NEEDED FOR SHORTNESS OF BREATH /DYSPNEA OR WHEEZING "   acetaminophen (TYLENOL) 32 mg/mL liquid   No No   Sig: Take 15.65 mLs (500 mg) by mouth every 4 hours as needed for fever or mild pain   alteplase 2 mg/2 mL (in 10 mL syringe)   Yes No   Sig: Inject 1 mg into the vein as needed   amphetamine-dextroamphetamine (ADDERALL) 20 MG tablet   No No   Sig: TAKE ONE TABLET BY MOUTH ONCE DAILY   carvedilol (COREG) 6.25 MG tablet  Self Yes No   Sig: Take 6.25 mg by mouth 2 times daily (with meals)   cyanocobalamin (CYANOCOBALAMIN) 1000 MCG/ML injection   No No   Sig: Inject 1 mL (1,000 mcg) into the muscle every 30 days   diphenhydrAMINE (BENADRYL) 25 MG capsule   Yes No   Sig: Take 1 capsule (25 mg) by mouth every 8 hours Give 30 minutes prior to 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   fentaNYL (DURAGESIC) 25 mcg/hr 72 hr patch   No No   Sig: Place 1 patch onto the skin every 48 hours remove old patch.   May fill 03/08/21 and start 03/10/21   lidocaine 7.5 mL, alum & mag hydroxide-simethicone 7.5 mL GI Cocktail   No No   Sig: Take 15 mLs by mouth once as needed for mouth, throat or stomach pain   multivitamin (CENTRUM SILVER) tablet   Yes No   Sig: Take 1 tablet by mouth daily   naloxone (NARCAN) 4 MG/0.1ML nasal spray   No No   Sig: Spray 1 spray (4 mg) into one nostril alternating nostrils as needed for opioid reversal every 2-3 minutes until assistance arrives   Patient not taking: Reported on 2/22/2021   nicotine (NICODERM CQ) 21 MG/24HR 24 hr patch   Yes No   Sig: Place 1 patch onto the skin every 24 hours   nystatin (MYCOSTATIN) 043574 UNIT/GM external cream   No No   Sig: Apply topically 2 times daily   ondansetron (ZOFRAN-ODT) 8 MG ODT tab   No No   Sig: DISSOLVE ONE TABLET ON TONGUE EVERY 8 HOURS AS NEEDED FOR NAUSEA   oxyCODONE (ROXICODONE) 5 MG/5ML solution   No No   Sig: Take 5-10 mLs (5-10 mg) by mouth every 4 hours as needed for severe  pain   oxyCODONE (ROXICODONE) 5 MG/5ML solution   No No   Sig: Take 5-10 mLs (5-10 mg) by mouth 3 times daily as needed for pain Max of 30mg/day. Fill 03/08/21 to start on/after 03/10/21. 30 day supply.   pantoprazole (PROTONIX) 40 MG EC tablet   No No   Sig: Take 1 tablet (40 mg) by mouth daily   parenteral nutrition - PTA/DISCHARGE ORDER   Yes No   Sig: Patient receives 2050 mL TPN every 14 hours through PICC at [facility].   polyethylene glycol (MIRALAX) 17 g packet   No No   Sig: Take 17 g by mouth daily   sucralfate (CARAFATE) 1 GM/10ML suspension   No No   Sig: Take 10 mLs (1 g) by mouth 4 times daily as needed   vitamin D2 (ERGOCALCIFEROL) 36437 units (1250 mcg) capsule   No No   Sig: Take 1 capsule (50,000 Units) by mouth once a week      Facility-Administered Medications: None     Allergies   Allergies   Allergen Reactions     Bactrim [Sulfamethoxazole W/Trimethoprim] Rash     Penicillins Anaphylaxis     Please see Antimicrobial Management Team allergy assessment note 10/10/2018. Patient reported tolerating amoxicillin.     Doxycycline Rash     Vancomycin Rash     Rash after receiving vancomycin 3/28/16 (red man's?). Tolerated with slower infusion and diphenhydramine premed.       Physical Exam   Vital Signs: Temp: 102  F (38.9  C) Temp src: Oral BP: 130/70 Pulse: 90   Resp: 16 SpO2: 96 % O2 Device: None (Room air)    Weight: 179 lbs 0 oz    GENERAL: Alert and oriented x 3. NAD. Ambulatory. Cooperative.   HEENT: Anicteric sclera. Mucous membranes moist. NC. AT.   CV: RRR. S1, S2. No murmurs appreciated.   RESPIRATORY: Effort normal on RA. Lungs CTAB with no wheezing, rales, rhonchi.   GI: Abdomen soft and non distended with normoactive bowel sounds present in all quadrants. No tenderness, rebound, guarding. No lesions.   NEUROLOGICAL: No focal deficits. Moves all extremities.  CN 2-12 grossly intact.  EXTREMITIES: No peripheral edema. Intact bilateral pedal pulses.   SKIN: No jaundice. No  juan m.      Data   Data reviewed today: I reviewed all medications, new labs and imaging results over the last 24 hours.   Results for SARA HASSAN (MRN [id number]) as of 3/19/2021 17:43   Ref. Range 3/19/2021 16:43   WBC Latest Ref Range: 4.0 - 11.0 10e9/L 6.6   Hemoglobin Latest Ref Range: 13.3 - 17.7 g/dL 14.0   Hematocrit Latest Ref Range: 40.0 - 53.0 % 41.8   Platelet Count Latest Ref Range: 150 - 450 10e9/L 147 (L)   RBC Count Latest Ref Range: 4.4 - 5.9 10e12/L 4.49   MCV Latest Ref Range: 78 - 100 fl 93   MCH Latest Ref Range: 26.5 - 33.0 pg 31.2   MCHC Latest Ref Range: 31.5 - 36.5 g/dL 33.5   RDW Latest Ref Range: 10.0 - 15.0 % 15.8 (H)   Results for SARA HASSAN (MRN [id number]) as of 3/19/2021 17:43   Ref. Range 3/19/2021 16:43   Sodium Latest Ref Range: 133 - 144 mmol/L 140   Potassium Latest Ref Range: 3.4 - 5.3 mmol/L 3.2 (L)   Chloride Latest Ref Range: 94 - 109 mmol/L 106   Carbon Dioxide Latest Ref Range: 20 - 32 mmol/L 26   Urea Nitrogen Latest Ref Range: 7 - 30 mg/dL 8   Creatinine Latest Ref Range: 0.66 - 1.25 mg/dL 0.68

## 2021-03-20 LAB
ANION GAP SERPL CALCULATED.3IONS-SCNC: 5 MMOL/L (ref 3–14)
BASOPHILS # BLD AUTO: 0 10E9/L (ref 0–0.2)
BASOPHILS NFR BLD AUTO: 0.3 %
BUN SERPL-MCNC: 7 MG/DL (ref 7–30)
C PNEUM DNA SPEC QL NAA+PROBE: NOT DETECTED
CALCIUM SERPL-MCNC: 8.1 MG/DL (ref 8.5–10.1)
CHLORIDE SERPL-SCNC: 111 MMOL/L (ref 94–109)
CO2 SERPL-SCNC: 26 MMOL/L (ref 20–32)
CREAT SERPL-MCNC: 0.71 MG/DL (ref 0.66–1.25)
DIFFERENTIAL METHOD BLD: ABNORMAL
EOSINOPHIL # BLD AUTO: 0 10E9/L (ref 0–0.7)
EOSINOPHIL NFR BLD AUTO: 0.3 %
ERYTHROCYTE [DISTWIDTH] IN BLOOD BY AUTOMATED COUNT: 16 % (ref 10–15)
FLUAV H1 2009 PAND RNA SPEC QL NAA+PROBE: NOT DETECTED
FLUAV H1 RNA SPEC QL NAA+PROBE: NOT DETECTED
FLUAV H3 RNA SPEC QL NAA+PROBE: NOT DETECTED
FLUAV RNA SPEC QL NAA+PROBE: NOT DETECTED
FLUBV RNA SPEC QL NAA+PROBE: NOT DETECTED
GFR SERPL CREATININE-BSD FRML MDRD: >90 ML/MIN/{1.73_M2}
GLUCOSE SERPL-MCNC: 123 MG/DL (ref 70–99)
HADV DNA SPEC QL NAA+PROBE: NOT DETECTED
HCOV PNL SPEC NAA+PROBE: NOT DETECTED
HCT VFR BLD AUTO: 40.3 % (ref 40–53)
HGB BLD-MCNC: 12.9 G/DL (ref 13.3–17.7)
HMPV RNA SPEC QL NAA+PROBE: NOT DETECTED
HPIV1 RNA SPEC QL NAA+PROBE: NOT DETECTED
HPIV2 RNA SPEC QL NAA+PROBE: NOT DETECTED
HPIV3 RNA SPEC QL NAA+PROBE: NOT DETECTED
HPIV4 RNA SPEC QL NAA+PROBE: NOT DETECTED
IMM GRANULOCYTES # BLD: 0 10E9/L (ref 0–0.4)
IMM GRANULOCYTES NFR BLD: 0.2 %
LYMPHOCYTES # BLD AUTO: 0.5 10E9/L (ref 0.8–5.3)
LYMPHOCYTES NFR BLD AUTO: 7.8 %
M PNEUMO DNA SPEC QL NAA+PROBE: NOT DETECTED
MCH RBC QN AUTO: 30.1 PG (ref 26.5–33)
MCHC RBC AUTO-ENTMCNC: 32 G/DL (ref 31.5–36.5)
MCV RBC AUTO: 94 FL (ref 78–100)
MICROBIOLOGIST REVIEW: NORMAL
MONOCYTES # BLD AUTO: 0.7 10E9/L (ref 0–1.3)
MONOCYTES NFR BLD AUTO: 11.9 %
NEUTROPHILS # BLD AUTO: 4.6 10E9/L (ref 1.6–8.3)
NEUTROPHILS NFR BLD AUTO: 79.5 %
NRBC # BLD AUTO: 0 10*3/UL
NRBC BLD AUTO-RTO: 0 /100
PLATELET # BLD AUTO: 133 10E9/L (ref 150–450)
POTASSIUM SERPL-SCNC: 3.6 MMOL/L (ref 3.4–5.3)
RBC # BLD AUTO: 4.29 10E12/L (ref 4.4–5.9)
RSV RNA SPEC QL NAA+PROBE: NOT DETECTED
RSV RNA SPEC QL NAA+PROBE: NOT DETECTED
RV+EV RNA SPEC QL NAA+PROBE: NOT DETECTED
SODIUM SERPL-SCNC: 141 MMOL/L (ref 133–144)
WBC # BLD AUTO: 5.8 10E9/L (ref 4–11)

## 2021-03-20 PROCEDURE — 214N000002 HC R&B CCU OVERFLOW UMMC

## 2021-03-20 PROCEDURE — 99233 SBSQ HOSP IP/OBS HIGH 50: CPT | Performed by: INTERNAL MEDICINE

## 2021-03-20 PROCEDURE — 250N000013 HC RX MED GY IP 250 OP 250 PS 637: Performed by: INTERNAL MEDICINE

## 2021-03-20 PROCEDURE — 36415 COLL VENOUS BLD VENIPUNCTURE: CPT | Performed by: INTERNAL MEDICINE

## 2021-03-20 PROCEDURE — 87581 M.PNEUMON DNA AMP PROBE: CPT | Performed by: INTERNAL MEDICINE

## 2021-03-20 PROCEDURE — 85025 COMPLETE CBC W/AUTO DIFF WBC: CPT | Performed by: PHYSICIAN ASSISTANT

## 2021-03-20 PROCEDURE — 999N000127 HC STATISTIC PERIPHERAL IV START W US GUIDANCE

## 2021-03-20 PROCEDURE — 258N000003 HC RX IP 258 OP 636: Performed by: PHYSICIAN ASSISTANT

## 2021-03-20 PROCEDURE — 250N000013 HC RX MED GY IP 250 OP 250 PS 637: Performed by: PHYSICIAN ASSISTANT

## 2021-03-20 PROCEDURE — 36415 COLL VENOUS BLD VENIPUNCTURE: CPT | Performed by: PHYSICIAN ASSISTANT

## 2021-03-20 PROCEDURE — 80048 BASIC METABOLIC PNL TOTAL CA: CPT | Performed by: PHYSICIAN ASSISTANT

## 2021-03-20 PROCEDURE — 87633 RESP VIRUS 12-25 TARGETS: CPT | Performed by: INTERNAL MEDICINE

## 2021-03-20 PROCEDURE — 87486 CHLMYD PNEUM DNA AMP PROBE: CPT | Performed by: INTERNAL MEDICINE

## 2021-03-20 PROCEDURE — 250N000011 HC RX IP 250 OP 636: Performed by: INTERNAL MEDICINE

## 2021-03-20 PROCEDURE — 250N000011 HC RX IP 250 OP 636: Performed by: PHYSICIAN ASSISTANT

## 2021-03-20 PROCEDURE — 87040 BLOOD CULTURE FOR BACTERIA: CPT | Performed by: INTERNAL MEDICINE

## 2021-03-20 PROCEDURE — 99222 1ST HOSP IP/OBS MODERATE 55: CPT | Mod: GC | Performed by: INTERNAL MEDICINE

## 2021-03-20 PROCEDURE — 250N000013 HC RX MED GY IP 250 OP 250 PS 637: Performed by: NURSE PRACTITIONER

## 2021-03-20 RX ORDER — DIPHENHYDRAMINE HCL 25 MG
25 CAPSULE ORAL EVERY 12 HOURS PRN
Status: DISCONTINUED | OUTPATIENT
Start: 2021-03-20 | End: 2021-03-20 | Stop reason: DRUGHIGH

## 2021-03-20 RX ORDER — OXYCODONE HCL 5 MG/5 ML
5-10 SOLUTION, ORAL ORAL
Status: DISCONTINUED | OUTPATIENT
Start: 2021-03-20 | End: 2021-03-23 | Stop reason: HOSPADM

## 2021-03-20 RX ORDER — DEXTROSE, SODIUM CHLORIDE, SODIUM LACTATE, POTASSIUM CHLORIDE, AND CALCIUM CHLORIDE 5; .6; .31; .03; .02 G/100ML; G/100ML; G/100ML; G/100ML; G/100ML
INJECTION, SOLUTION INTRAVENOUS CONTINUOUS
Status: DISCONTINUED | OUTPATIENT
Start: 2021-03-20 | End: 2021-03-23 | Stop reason: HOSPADM

## 2021-03-20 RX ORDER — SODIUM CHLORIDE, SODIUM LACTATE, POTASSIUM CHLORIDE, CALCIUM CHLORIDE 600; 310; 30; 20 MG/100ML; MG/100ML; MG/100ML; MG/100ML
INJECTION, SOLUTION INTRAVENOUS CONTINUOUS
Status: DISCONTINUED | OUTPATIENT
Start: 2021-03-20 | End: 2021-03-20

## 2021-03-20 RX ORDER — DIPHENHYDRAMINE HCL 12.5MG/5ML
25 LIQUID (ML) ORAL EVERY 12 HOURS PRN
Status: DISCONTINUED | OUTPATIENT
Start: 2021-03-20 | End: 2021-03-23 | Stop reason: HOSPADM

## 2021-03-20 RX ORDER — FENTANYL 25 UG/1
25 PATCH TRANSDERMAL
Status: DISCONTINUED | OUTPATIENT
Start: 2021-03-21 | End: 2021-03-23 | Stop reason: HOSPADM

## 2021-03-20 RX ADMIN — OXYCODONE HYDROCHLORIDE 10 MG: 5 SOLUTION ORAL at 23:53

## 2021-03-20 RX ADMIN — QUETIAPINE 25 MG: 25 TABLET ORAL at 22:19

## 2021-03-20 RX ADMIN — CEFEPIME HYDROCHLORIDE 2 G: 2 INJECTION, POWDER, FOR SOLUTION INTRAVENOUS at 16:42

## 2021-03-20 RX ADMIN — ACETAMINOPHEN 500 MG: 325 SOLUTION ORAL at 06:05

## 2021-03-20 RX ADMIN — SUCRALFATE 1 G: 1 SUSPENSION ORAL at 04:39

## 2021-03-20 RX ADMIN — OXYCODONE HYDROCHLORIDE 10 MG: 5 SOLUTION ORAL at 10:23

## 2021-03-20 RX ADMIN — Medication 1 PATCH: at 18:37

## 2021-03-20 RX ADMIN — CEFEPIME HYDROCHLORIDE 2 G: 2 INJECTION, POWDER, FOR SOLUTION INTRAVENOUS at 01:12

## 2021-03-20 RX ADMIN — VANCOMYCIN HYDROCHLORIDE 1750 MG: 10 INJECTION, POWDER, LYOPHILIZED, FOR SOLUTION INTRAVENOUS at 21:13

## 2021-03-20 RX ADMIN — OXYCODONE HYDROCHLORIDE 10 MG: 5 SOLUTION ORAL at 15:09

## 2021-03-20 RX ADMIN — PANTOPRAZOLE SODIUM 40 MG: 40 TABLET, DELAYED RELEASE ORAL at 08:16

## 2021-03-20 RX ADMIN — LORAZEPAM 1 MG: 2 LIQUID ORAL at 22:20

## 2021-03-20 RX ADMIN — DEXTROAMPHETAMINE SACCHARATE, AMPHETAMINE ASPARTATE, DEXTROAMPHETAMINE SULFATE AND AMPHETAMINE SULFATE 20 MG: 2.5; 2.5; 2.5; 2.5 TABLET ORAL at 11:11

## 2021-03-20 RX ADMIN — VANCOMYCIN HYDROCHLORIDE 1750 MG: 10 INJECTION, POWDER, LYOPHILIZED, FOR SOLUTION INTRAVENOUS at 05:54

## 2021-03-20 RX ADMIN — SODIUM CHLORIDE, SODIUM LACTATE, POTASSIUM CHLORIDE, CALCIUM CHLORIDE AND DEXTROSE MONOHYDRATE: 5; 600; 310; 30; 20 INJECTION, SOLUTION INTRAVENOUS at 20:33

## 2021-03-20 RX ADMIN — CEFEPIME HYDROCHLORIDE 2 G: 2 INJECTION, POWDER, FOR SOLUTION INTRAVENOUS at 10:25

## 2021-03-20 RX ADMIN — DIPHENHYDRAMINE HYDROCHLORIDE 25 MG: 25 CAPSULE ORAL at 05:54

## 2021-03-20 RX ADMIN — ACETAMINOPHEN 500 MG: 325 SOLUTION ORAL at 11:45

## 2021-03-20 RX ADMIN — CARVEDILOL 6.25 MG: 6.25 TABLET, FILM COATED ORAL at 18:28

## 2021-03-20 RX ADMIN — ACETAMINOPHEN 500 MG: 325 SOLUTION ORAL at 18:37

## 2021-03-20 RX ADMIN — ONDANSETRON 4 MG: 4 TABLET, ORALLY DISINTEGRATING ORAL at 21:28

## 2021-03-20 RX ADMIN — CARVEDILOL 6.25 MG: 6.25 TABLET, FILM COATED ORAL at 08:16

## 2021-03-20 RX ADMIN — OXYCODONE HYDROCHLORIDE 10 MG: 5 SOLUTION ORAL at 03:55

## 2021-03-20 RX ADMIN — DIPHENHYDRAMINE HYDROCHLORIDE 25 MG: 25 SOLUTION ORAL at 20:35

## 2021-03-20 RX ADMIN — ONDANSETRON 4 MG: 2 INJECTION INTRAMUSCULAR; INTRAVENOUS at 15:17

## 2021-03-20 ASSESSMENT — ACTIVITIES OF DAILY LIVING (ADL)
ADLS_ACUITY_SCORE: 11

## 2021-03-20 NOTE — PROVIDER NOTIFICATION
Provider notified RE: Obs 6, Parker Acevedo. Patient's potassium is 3.2. Would you like to replace it at this time?    Patient also requesting Seroquel. Pt states that it helps him sleep. [phone]

## 2021-03-20 NOTE — CONSULTS
[facility]SULT[facility]     Patient:  Parker Acevedo   YOB: 1970  Date of Visit:  03/20/2021  Date of Admission: 3/19/2021  Consult Requester:Kristie Tom MD          Assessment and Recommendations:   ID Problem List:  1. Sepsis, with concern for central line infection from Cm catheter:  - s/p removal on 03/19  - preliminary culture data thus far with >100 colonies of staph epidermidis (from 03/19 catheter tip culture)  - Hx of previous frequent polymicrobial line infections  2. Prior Celestino-en-Y gastric bypass, esophagojejunostomy:  - Complicated by short gut syndrome and chronic malnutrition requiring TPN.    Recommendations:  1. Continue with current broad spectrum empiric antimicrobials as ordered (vanocmycin/cefepime). Anticipate potential de-escalation pending clinical course and additional culture data.   2. Repeat BC x 2 again today (03/20) and tomorrow (03/21). [These have been ordered for you].  3. Would advise obtaining respiratory pathogen panel PCR [This has been ordered for you].    Discussion:  Parker Acevedo is a 50 year old male with a PMH of Celestino-en-Y gastric bypass and esophagojejunostomy complicated by short gut syndrome and chronic malnutrition requiring TPN, as well as several incidents of polymicrobial central line infection resulting in line replacements; admitted on 03/19 with suspected recurrent episode. Patient inadvertently cut his cm catheter at home and shortly thereafter developed multiple systemic complaints of fevers/chills/mylagias/weakness/headache. He underwent IR guided line removal on 03/19, at which point he was noted to be febrile and sent to ED. Certainly, this presentation once again does raise concern for potential recurrent line infection, especially in setting of him accidentally tampering with it at home. Preliminary BC thus far are positive for staph epidermidis. At this point, we do recommend continuation of  broad spectrum antimicrobials as ordered for now, while awaiting additional culture data, with plan to repeat BC x2 today and again tomorrow. Would add on respiratory pathogen panel as well to rule out alternative etiologies, including influenza. Otherwise, patient is overall generally well appearing at this time and has no other significant localizing symptoms/physical exam findings to suggest alternative sources of infection. Again, will plan to await additional culture data, follow patient's clinical course, and potentially de-escalate antibiotics from there.     Thank you for the consult. ID will continue to follow with you.     Elvira Greer DO, MPH  Infectious Disease Fellow, PGY-4   Pager: [phone]  2021     Plan discussed with Dr. Laureano and patient.   ________________________________________________________________    Consult Question: Concern for CLABSI  Admission Diagnosis: Fever, unknown origin [R50.9]         History of Present Illness:   History obtained from review of chart and discussion with patient.     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 Patient reports that he was at home cutting out pictures for a banner/poster for a family member's  a few days ago when he inadvertently cut his azevedo catheter. He notes that his wife capped it and dressed it immediately. However, shortly thereafter, patient began feeling generally unwell. He describes developing onset of subjective fevers/chills, as well as myalgias, generalized weakness, and headache. Patient was able to schedule an appointment yesterday,  for catheter replacement. However, while there, he was noted to be febrile and was  subsequently sent to ED for evaluation. Line was removed at that time but not replaced. Prior to that, his azevedo catheter had been replaced on 02/05 following an admission 01/29 - 02/05 for polymicrobial line infection. In review of EMR, patient has had multiple admissions with similar presentation and does have history of multiple previous line infections with a variety of organisms including: Streptococcus salivarius, Streptococcus mitis, Staphylococcus hominis, Stahylococcus capitis, Staphylococcus epidermidis, Enterococcus faecalis, Aerococcus viridans, Staphylococcus lugdunensis, Psychrobacter faecalis, Corynebacterium,  Finegoldia magna, Granulicatella adiacens, Streptococcus salivarius, Rothia mucilaginosa, Candida albicans. Patient notes that his current symptoms feel similar in nature to previous line infections. He otherwise denies any respiratory complaints, GI symptoms, urinary symptoms, or other issues at this time. Patient does have two dogs in the home, but reports that neither have been near his catheter site. No additional concerns or complaints at this time.    On arrival, patient was noted to be febrile up to 102.6, otherwise has remained HD stable. No leukocytosis and CMP, procalcitonin, and lactic acid have all been grossly normal.  CXR was obtained on admission was largely unremarkable; COVID-19 PCR negative. Patient received an intra-procedure dose of clindamycin and currently remains on cefepime/vancomycin.          Review of Systems:   10 point review of systems was negative except for noted as above in HPI.     ROS:  Constitutional: +no fever/chills/fatigue/weakness. No weight loss, no night sweats.   Neuro: +HA (resolved).  No focal weakness  HEENT: no acute blurry vision, no earache, no ear discharge, no sore throat, no lymphadenopathy.   CVS: no chest pain, heart palpitation, no syncope or presyncope  Lungs: no dyspnea, no chest pain, no cough.   GI: no N/V, no diarrhea, no  constipation, no abdominal pain  : no dysuria, no hesitancy, no incontinence  Skin: no rash, no Itching, no abscesses  Musculoskeletal: + myalgias. No joint pain, no joint swelling or limited ROM             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     Complication of anesthesia      Difficulty swallowing      Gastric ulcer, unspecified as acute or chronic, without mention of hemorrhage, perforation, or obstruction      Gastro-oesophageal reflux disease      Head injury      Hiatal hernia      Other bladder disorder      Other chronic pain      PONV (postoperative nausea and vomiting)      Severe malnutrition (H)     TPN     Short gut syndrome      Tobacco abuse             Past Surgical History:     Past Surgical History:   Procedure Laterality Date     AMPUTATION       APPENDECTOMY       BACK SURGERY  11/3/2014    curve in the spine     BIOPSY LYMPH NODE CERVICAL N/A 2/20/2015    Procedure: BIOPSY LYMPH NODE CERVICAL;  Surgeon: Baron Scanlon MD;  [facility]     C GASTRIC BYPASS,OBESE<100CM SHAYLEE-EN-Y  2002    lost 300 pounds     CHOLECYSTECTOMY       DISCECTOMY, FUSION CERVICAL ANTERIOR ONE LEVEL, COMBINED N/A 2/15/2017    Procedure: COMBINED DISCECTOMY, FUSION CERVICAL ANTERIOR ONE LEVEL;  Surgeon: Darren Campos MD;  [facility]     ENDOSCOPIC INSERTION TUBE GASTROSTOMY  9/9/2013    Procedure: ENDOSCOPIC INSERTION TUBE GASTROSTOMY;;  Surgeon: Francis Vyas MD;  [facility]     ENDOSCOPIC ULTRASOUND UPPER GASTROINTESTINAL TRACT (GI)  4/29/2011    Procedure:ENDOSCOPIC ULTRASOUND UPPER GASTROINTESTINAL TRACT (GI); Both Procedures done Conjointly; Surgeon:NEREIDA HOUSER; [facility]      ENDOSCOPIC ULTRASOUND UPPER GASTROINTESTINAL TRACT (GI)  9/9/2013    Procedure: ENDOSCOPIC ULTRASOUND UPPER GASTROINTESTINAL TRACT (GI);  Endoscopic Ultrasound Guide Gastrostomy Tube Placement  C-arm;  Surgeon: Noe Lizarraga MD;  [facility]     ENDOSCOPIC ULTRASOUND UPPER GASTROINTESTINAL TRACT (GI) N/A 2/24/2021    Procedure: ENDOSCOPIC ULTRASOUND, ESOPHAGOSCOPY / UPPER GASTROINTESTINAL TRACT (GI), esophagastrogastroduodenoscopy;  Surgeon: Berny Bach MD;  [facility]     ENDOSCOPY  03/25/11    EGD, [facility]     ENDOSCOPY  08/04/09    Upper Endoscopy, [facility]     ENDOSCOPY  01/05/09    Upper Endoscopy, [facility]     ESOPHAGOSCOPY, GASTROSCOPY, DUODENOSCOPY (EGD), COMBINED  4/20/2011    Procedure:COMBINED ESOPHAGOSCOPY, GASTROSCOPY, DUODENOSCOPY (EGD); Surgeon:BLU VYAS; [facility]     ESOPHAGOSCOPY, GASTROSCOPY, DUODENOSCOPY (EGD), COMBINED  6/15/2011    Procedure:COMBINED ESOPHAGOSCOPY, GASTROSCOPY, DUODENOSCOPY (EGD); Surgeon:BLU VYAS; [facility]     ESOPHAGOSCOPY, GASTROSCOPY, DUODENOSCOPY (EGD), COMBINED  6/12/2013    Procedure: COMBINED ESOPHAGOSCOPY, GASTROSCOPY, DUODENOSCOPY (EGD);;  Surgeon: Blu Vyas MD;  [facility]     ESOPHAGOSCOPY, GASTROSCOPY, DUODENOSCOPY (EGD), COMBINED  11/22/2013    Procedure: COMBINED ESOPHAGOSCOPY, GASTROSCOPY, DUODENOSCOPY (EGD);;  Surgeon: Blu Vyas MD;  [facility]     ESOPHAGOSCOPY, GASTROSCOPY, DUODENOSCOPY (EGD), COMBINED  4/30/2014    Procedure: COMBINED ESOPHAGOSCOPY, GASTROSCOPY, DUODENOSCOPY (EGD);  Surgeon: Blu Vyas MD;  [facility]     ESOPHAGOSCOPY, GASTROSCOPY, DUODENOSCOPY (EGD), COMBINED N/A 2/20/2015    Procedure: COMBINED ESOPHAGOSCOPY, GASTROSCOPY, DUODENOSCOPY (EGD), BIOPSY SINGLE OR MULTIPLE;  Surgeon: Baron Scanlon MD;  [facility]     ESOPHAGOSCOPY, GASTROSCOPY, DUODENOSCOPY (EGD), COMBINED N/A 9/30/2015     Procedure: COMBINED ESOPHAGOSCOPY, GASTROSCOPY, DUODENOSCOPY (EGD);  Surgeon: Francis Vyas MD;  Location:  GI     ESOPHAGOSCOPY, GASTROSCOPY, DUODENOSCOPY (EGD), COMBINED N/A 10/3/2019    Procedure: Upper Endoscopy;  Surgeon: Clif Morrow MD;  [facility]     GASTRECTOMY  6/22/2012    Procedure: GASTRECTOMY;  Open Approach, Excise Ulcers,Partial Gastrectomy, Esophagojejunostomy, Hiatal Hernia Repair, Extensive Lysis of Adhesions and Esaphagogastrodudenoscopy.;  Surgeon: Francis Vyas MD;  [facility]     GASTROJEJUNOSTOMY  08/26/09    Extensice enterolysis, partial resect. jejunum, part. resect gastric pouch, gastrojejunostomy anastomosis     HC ESOPH/GAS REFLUX TEST W NASAL IMPED ELECTRODE  8/5/2013    Procedure: ESOPHAGEAL IMPEDENCE FUNCTION TEST 1 HOUR OR LESS;  Surgeon: Halie Lang MD;  Location:  GI     HEAD & NECK SURGERY  2/15/2017    C5-C6     HERNIA REPAIR  2006    Umbilical hernia     HERNIORRHAPHY HIATAL  6/22/2012    Procedure: HERNIORRHAPHY HIATAL;;  Surgeon: Francis Vyas MD;  [facility]     HERNIORRHAPHY INGUINAL  11/22/2013    Procedure: HERNIORRHAPHY INGUINAL;;  Surgeon: Francis Vyas MD;  [facility]     INSERT PICC LINE Right 12/19/2019    Procedure: Picc Placement;  Surgeon: Per Dumont PA-C;  [facility]     INSERT PICC LINE Right 2/21/2020    Procedure: INSERTION, PICC;  Surgeon: Per Dumont PA-C;  [facility]     INSERT PORT VASCULAR ACCESS Right 12/19/2017    Procedure: INSERT PORT VASCULAR ACCESS;  Right Chest Port Placement ;  Surgeon: Lisandro Alejandro PA-C;  [facility]     INSERT PORT VASCULAR ACCESS Right 8/2/2018    Procedure: INSERT PORT VASCULAR ACCESS;  Place single lumen tunneled central venous access catheter;  Surgeon: Guy Jamil PA-C;  [facility]     IR CVC TUNNEL PLACEMENT > 5 YRS OF AGE  8/7/2019     IR CVC TUNNEL PLACEMENT > 5 YRS OF AGE  4/14/2020     IR  CVC TUNNEL PLACEMENT > 5 YRS OF AGE  8/3/2020     IR CVC TUNNEL PLACEMENT > 5 YRS OF AGE  9/4/2020     IR CVC TUNNEL PLACEMENT > 5 YRS OF AGE  2/5/2021     IR CVC TUNNEL REMOVAL RIGHT  10/1/2019     IR CVC TUNNEL REMOVAL RIGHT  7/30/2020     IR CVC TUNNEL REMOVAL RIGHT  9/2/2020     IR CVC TUNNEL REMOVAL RIGHT  2/3/2021     IR CVC TUNNEL REMOVAL RIGHT  3/19/2021     IR FOLLOW UP VISIT OUTPATIENT  8/7/2019     IR PICC PLACEMENT > 5 YRS OF AGE  3/7/2019     IR PICC PLACEMENT > 5 YRS OF AGE  12/19/2019     IR PICC PLACEMENT > 5 YRS OF AGE  2/21/2020     LAPAROTOMY EXPLORATORY  11/22/2013    Procedure: LAPAROTOMY EXPLORATORY;  Exploratory Laparotomy, Upper Endoscopy, Left Inguinal Hernia Repair;  Surgeon: Francis Vyas MD;  [facility]     ORTHOPEDIC SURGERY       PICC INSERTION Right 03/16/2017    5fr DL BioFlo PICC, 42cm (3cm external) in the R medial brachial vein w/ tip in the SVC RA junction.     PICC INSERTION Left 09/23/2017    5fr DL BioFlo PICC, 45cm (1cm external) in the L basilic vein w/ tip in the SVC RA junction.     PICC INSERTION Right 05/16/2019    5Fr - 43cm, Medial brachial vein, low SVC     PICC INSERTION Right 10/02/2019    5Fr - 43cm (2cm external), basilic vein, low SVC     SHAYLEE EN Y BOWEL  2003     SOFT TISSUE SURGERY       THORACIC SURGERY       TONSILLECTOMY       TRANSESOPHAGEAL ECHOCARDIOGRAM INTRAOPERATIVE N/A 1/8/2019    Procedure: TRANSESOPHAGEAL ECHOCARDIOGRAM INTRAOPERATIVE;  Surgeon: GENERIC ANESTHESIA PROVIDER;  [facility]            Family History:   Reviewed and non-contributory.   Family History   Problem Relation Age of Onset     Gastrointestinal Disease Mother         Crohns disease     Anxiety Disorder Mother      Thyroid Disease Mother         Grave's disease     Cancer Father         ear cancer-skin cancer/melanoma     Breast Cancer Maternal Grandmother      Macular Degeneration Maternal Grandfather      Anxiety Disorder Sister      Diabetes Maternal Uncle       Breast Cancer Other      Hypertension No family hx of      Hyperlipidemia No family hx of      Cerebrovascular Disease No family hx of      Prostate Cancer No family hx of      Depression No family hx of      Anesthesia Reaction No family hx of      Asthma No family hx of      Osteoporosis No family hx of      Genetic Disorder No family hx of      Obesity No family hx of      Mental Illness No family hx of      Substance Abuse No family hx of      Glaucoma No family hx of             Social History:     Social History     Tobacco Use     Smoking status: Current Some Day Smoker     Types: Cigarettes     Smokeless tobacco: Never Used     Tobacco comment: 2/4/2021    smokes 3 cigarettes/day   Substance Use Topics     Alcohol use: No     Frequency: Never     Drinks per session: Patient refused     Binge frequency: Never     Comment: quit 2002     History   Sexual Activity     Sexual activity: Yes     Partners: Female     Birth control/ protection: None     Comment: no protection            Current Medications:       amphetamine-dextroamphetamine  20 mg Oral Daily     carvedilol  6.25 mg Oral BID w/meals     ceFEPIme (MAXIPIME) IV  2 g Intravenous Q8H     fentaNYL  25 mcg Transdermal Q48H     fentaNYL   Transdermal Q8H     Lidocaine  1 patch Transdermal Q24h     lidocaine   Transdermal Q8H     nicotine  1 patch Transdermal Q24H     nicotine   Transdermal Q8H     pantoprazole  40 mg Oral Daily     polyethylene glycol  17 g Oral BID     QUEtiapine  25 mg Oral At Bedtime     vancomycin (VANCOCIN) IV  1,750 mg Intravenous Q12H     [START ON 3/21/2021] vitamin D2  50,000 Units Oral Weekly            Allergies:     Allergies   Allergen Reactions     Bactrim [Sulfamethoxazole W/Trimethoprim] Rash     Penicillins Anaphylaxis     Please see Antimicrobial Management Team allergy assessment note 10/10/2018. Patient reported tolerating amoxicillin.     Doxycycline Rash     Vancomycin Rash     Rash after receiving vancomycin 3/28/16  (red man's?). Tolerated with slower infusion and diphenhydramine premed.            Physical Exam:   Vitals were reviewed  Temp:  [98.3  F (36.8  C)-102.6  F (39.2  C)] 99.3  F (37.4  C)  Pulse:  [65-86] 70  Resp:  [16] 16  BP: (119-126)/(77-82) 126/82  SpO2:  [95 %-100 %] 100 %    Vitals:    03/19/21 1444   Weight: 81.2 kg (179 lb)       Constitutional: Adult male seen resting comfortably in bed. Overall non-toxic in appearance. Pleasant and interactive.   HEENT: NC/AT,sclera clear, conjunctiva normal.  Respiratory: No increased work of breathing, CTAB, no crackles or wheezing.  Cardiovascular: RRR, no murmur noted. No peripheral edema.  GI: Normal bowel sounds, soft, non-distended and non-tender.  Skin: Warm, dry, well-perfused. Scant area of drainage at site of previous azevedo catheter. Otherwise, no bruising, bleeding, rashes, or lesions on limited exam.  Musculoskeletal: Extremities grossly normal, non-tender, no edema. Good strength and ROM in bed.   Neurologic: A&O. Answers questions appropriately, speech normal. Moves all extremities spontaneously.  Neuropsychiatric: Calm. Affect appropriate to situation.  Vascular access: PIV in place are CDI, non-tender, no surrounding erythema.         Laboratory Data:     Microbiology:  Culture Micro   Date Value Ref Range Status   03/19/2021 No growth after 12 hours  Preliminary   03/19/2021 No growth after 12 hours  Preliminary   03/19/2021 >100 colonies  Staphylococcus epidermidis   (A)  Preliminary   03/19/2021 Culture in progress  Preliminary   03/19/2021 Culture negative monitoring continues  Preliminary   02/04/2021 No growth  Final   02/03/2021 No growth  Final   02/03/2021 No growth  Final   01/30/2021 No growth  Final   01/30/2021 No growth  Final   01/29/2021   Final    (Note)  POSITIVE for Staphylococci other than S.aureus, S.epidermidis and  S.lugdunensis, by [employer] multiplex nucleic acid test.  Coagulase-negative staphylococci are the most common  venipuncture or  collection associated skin CONTAMINANTS grown in blood cultures.  Final identification and antimicrobial susceptibility testing will be  verified by standard methods.    Specimen tested with [employer] multiplex, gram-positive blood culture  nucleic acid test for the following targets: Staph aureus, Staph  epidermidis, Staph lugdunensis, other Staph species, Enterococcus  faecalis, Enterococcus faecium, Streptococcus species, S. agalactiae,  S. anginosus grp., S. pneumoniae, S. pyogenes, Listeria sp., mecA  (methicillin resistance) and Melvin/B (vancomycin resistance).    Critical Value/Significant Value called to and read back by MARTHA PAULINO RN @ 1/30/21 2231      01/29/2021 (A)  Final    Cultured on the 1st day of incubation:  Staphylococcus hominis  Susceptibility testing done on previous specimen     01/29/2021 (A)  Final    Cultured on the 1st day of incubation:  Staphylococcus capitis     01/29/2021   Final    Critical Value/Significant Value, preliminary result only, called to and read back by  Martha Paulino RN 01/30/21 @1939 ANGEL     01/29/2021 (A)  Final    Cultured on the 1st day of incubation:  Staphylococcus epidermidis     01/29/2021 (A)  Final    Cultured on the 1st day of incubation:  Enterococcus faecalis  Susceptibility testing done on previous specimen     01/29/2021 (A)  Final    Cultured on the 1st day of incubation:  Aerococcus viridans     01/29/2021 (A)  Final    Cultured on the 1st day of incubation:  Corynebacterium jeikeium     01/29/2021   Final    Critical Value/Significant Value, preliminary result only, called to and read back by  Phillip Bowden RN [facility] at 1328 on 2.2.21 rd.     01/29/2021 (A)  Final    Cultured on the 1st day of incubation:  Streptococcus salivarius group     01/29/2021   Final    Critical Value/Significant Value, preliminary result only, called to and read back by  POLINA VAUGHN RN 01/30/21 1326 EH.     01/29/2021 (A)  Final    Cultured on the 1st day  of incubation:  Streptococcus mitis group     01/29/2021   Final    Critical Value/Significant Value, preliminary result only, called to and read back by  Phillip Bowden RN [facility] at 1328 on 2.2.21 rd.     01/28/2021 (A)  Final    Cultured on the 2nd day of incubation:  Gram positive bacilli resembling diphtheroids  No further identification     01/28/2021   Final    Critical Value/Significant Value, preliminary result only, called to and read back by  Jeet Paulino RN 01/30/21 @2007 ANGEL     01/28/2021 (A)  Final    Cultured on the 2nd day of incubation:  Corynebacterium species  Identification obtained by MALDI-TOF mass spectrometry research use only database. Test   characteristics determined and verified by the [facility]   ([facility]) [place], [place].     01/28/2021   Final    Critical Value/Significant Value, preliminary result only, called to and read back by  Phillip Bowden RN [facility] at 1328 on 2.2.21 rd.     01/28/2021 (A)  Final    Cultured on the 1st day of incubation:  Enterococcus faecalis     01/28/2021   Final    Critical Value/Significant Value, preliminary result only, called to and read back by   at 1325 1.29.21 KZ     01/28/2021 (A)  Final    Cultured on the 1st day of incubation:  Staphylococcus hominis     01/28/2021   Final    Critical Value/Significant Value, preliminary result only, called to and read back by  Dr. Buckner 1915 01.29.2021 NM     01/28/2021   Final    (Note)  POSITIVE for ENTEROCOCCUS FAECALIS and NEGATIVE for Melvin/vanB genes  by [employer] multiplex nucleic acid test. Final identification and  antimicrobial susceptibility testing will be verified by standard  methods.    Specimen tested with Verigene multiplex, gram-positive blood culture  nucleic acid test for the following targets: Staph aureus, Staph  epidermidis, Staph lugdunensis, other Staph species, Enterococcus  faecalis, Enterococcus faecium, Streptococcus species, S. agalactiae,  S. anginosus grp.,  S. pneumoniae, S. pyogenes, Listeria sp., mecA  (methicillin resistance) and Melvin/B (vancomycin resistance).    Critical Value/Significant Value called to and read back by Dr. Buckner, 1.29.21 @ 8086 pt.    POSITIVE for Staphylococci other than S.aureus, S.epidermidis and  S.lugdunensis, by [employer] multiplex nucleic acid test.  Coagulase-negative staphylococci are the most common venipuncture or  collection associated skin CONTAMINANTS grown in blood cultures.  Final identification and antimicrobial susceptibility testing will be  verified by standard methods.    Specimen tested with Verigene multiplex, gram-positive blood culture  nucleic acid test for the following targets: Staph aureus, Staph  epidermidis, Staph lugdunensis, other Staph species, Enterococcus  faecalis, Enterococcus faecium, Streptococcus species, S. agalactiae,  S. anginosus grp., S. pneumoniae, S. pyogenes, Listeria sp., mecA  (methicillin resistance) and Melvin/B (vancomycin resistance).    Critical Value/Significant Value called to and read back by Dr. Buckner  1917 01.29.2021 NM     11/17/2020 No growth  Final   11/17/2020 No growth  Final   11/17/2020 No growth  Final   10/29/2020 No growth  Final   10/29/2020 No growth  Final   09/03/2020 No growth  Final   09/03/2020 No growth  Final   09/02/2020 No growth  Final   09/02/2020 No growth  Final   09/02/2020 No growth  Final   09/02/2020 No growth  Final   09/02/2020 No growth  Final   09/01/2020 No growth  Final   09/01/2020 No growth  Final   08/31/2020 No growth  Final   08/31/2020 (A)  Final    Cultured on the 1st day of incubation:  Staphylococcus lugdunensis     08/31/2020   Final    Critical Value/Significant Value, preliminary result only, called to and read back by  Penny Alvarado RN @2200 09/01/2020 [facility]     08/29/2020 (A)  Final    Cultured on the 1st day of incubation:  Gram positive rods  Unable to further identify.     08/29/2020   Final    Critical Value/Significant Value, preliminary  result only, called to and read back by  Dr. Sara Warren 2038 8/30/20 AM     08/29/2020 (A)  Final    Cultured on the 1st day of incubation:  Psychrobacter faecalis  Previously reported as:  Gram positive cocci  CORRECTED ON:  9.2.2020 at 1432. KVO  CORRECTED ON 09/11 AT 1505: PREVIOUSLY REPORTED AS Cultured on the 1st day of incubation:   Psychrobacter species Further speciated as: Psychrobacter faecalis Previously reported as:   Gram positive cocci CORRECTED ON: 9.2.2020 at 1432. KVO     08/29/2020   Final    Critical Value/Significant Value, preliminary result only, called to and read back by  Sandie Burroughs RN at 1412 9.1.20.DK     08/29/2020   Final    Corrected report called to and read back by  Sandie Burroughs RN on 9.2.2020 at 1432. KVO     08/29/2020   Final    (Note)  NEGATIVE for the following: Staphylococcus spp., Staph aureus, Staph  epidermidis, Staph lugdunensis, Streptococcus spp., Strep pneumoniae,  Strep pyogenes, Strep agalactiae, Strep anginosus group, Enterococcus  faecalis, Enterococcus faecium, and Listeria spp. by Verigene  multiplex nucleic acid test. Final identification and antimicrobial  susceptibility testing will be verified by standard methods.    Specimen tested with Verigene multiplex, gram-positive blood culture  nucleic acid test for the following targets: Staph aureus, Staph  epidermidis, Staph lugdunensis, other Staph species, Enterococcus  faecalis, Enterococcus faecium, Streptococcus species, S. agalactiae,  S. anginosus grp., S. pneumoniae, S. pyogenes, Listeria sp., mecA  (methicillin resistance) and Melvin/B (vancomycin resistance).    Critical Value/Significant Value called to and read back by  Dr. Sara Warren 2038 8/30/20 AM       08/28/2020 No growth  Final   08/28/2020 No growth  Final   07/30/2020 (A)  Final    <15 colonies   Staphylococcus epidermidis  Susceptibility testing not routinely done     07/29/2020 No growth  Final   07/29/2020 No growth  Final   07/28/2020 No  growth  Final   07/28/2020 (A)  Final    Cultured on the 2nd day of incubation:  Gram positive cocci in clusters  Upon further review, there is not any gram positive cocci in clusters present in this   blood culture.     07/28/2020 (A)  Final    Cultured on the 2nd day of incubation:  Corynebacterium species  Identification obtained by MALDI-TOF mass spectrometry research use only database. Test   characteristics determined and verified by the [facility] Diagnostic Laboratory   ([facility]) [place], [place].     07/28/2020   Final    Critical Value/Significant Value, preliminary result only, called to and read back by  Richie Nugent RN 2223 7/30/20 AM     07/28/2020   Final    Updated report called to and read back by  Spring Lugo RN at 1300 on 8.2.20 ME     07/28/2020   Final    (Note)  NEGATIVE for the following: Staphylococcus spp., Staph aureus, Staph  epidermidis, Staph lugdunensis, Streptococcus spp., Strep pneumoniae,  Strep pyogenes, Strep agalactiae, Strep anginosus group, Enterococcus  faecalis, Enterococcus faecium, and Listeria spp. by [employer]  multiplex nucleic acid test. Final identification and antimicrobial  susceptibility testing will be verified by standard methods.    Critical Value/Significant Value called to and read back by LIZET BAUGH RN U5B 0119 07.31.20 CF     07/26/2020 (A)  Final    Cultured on the 1st day of incubation:  Staphylococcus epidermidis  Susceptibility testing done on previous specimen     07/26/2020   Final    Critical Value/Significant Value, preliminary result only, called to and read back by  Neha Quintana Pharmacist [facility] 7.27.20 1735. BRANDON     07/26/2020 (A)  Final    Cultured on the 1st day of incubation:  Strain 2  Staphylococcus epidermidis  Susceptibility testing done on previous specimen     07/26/2020 (A)  Final    Cultured on the 1st day of incubation:  Staphylococcus epidermidis     07/26/2020   Final    Critical Value/Significant Value, preliminary result only,  called to and read back by  Yaa Jarquin RN I 7.27.20 1616. BRANDON     07/26/2020 (A)  Final    Cultured on the 1st day of incubation:  Strain 2  Staphylococcus epidermidis     07/26/2020   Final    (Note)  POSITIVE for STAPHYLOCOCCUS EPIDERMIDIS and POSITIVE for the mecA  gene (resistant to methicillin) by [facility] multiplex nucleic acid  test. Final identification and antimicrobial susceptibility testing  will be verified by standard methods.    Specimen tested with Verigene multiplex, gram-positive blood culture  nucleic acid test for the following targets: Staph aureus, Staph  epidermidis, Staph lugdunensis, other Staph species, Enterococcus  faecalis, Enterococcus faecium, Streptococcus species, S. agalactiae,  S. anginosus grp., S. pneumoniae, S. pyogenes, Listeria sp., mecA  (methicillin resistance) and Melvin/B (vancomycin resistance).    Critical Value/Significant Value called to and read back by Chantale Jarrett RN. @2003. 7.27.20. BS.        04/03/2020 No growth  Final   04/03/2020 No growth  Final   11/02/2019 No growth  Final   11/02/2019 No growth  Final   10/30/2019 No growth  Final   10/30/2019 (A)  Final    Cultured on the 3rd day of incubation:  Corynebacterium species  Identification obtained by MALDI-TOF mass spectrometry research use only database. Test   characteristics determined and verified by the Infectious Diseases Diagnostic Laboratory   ([facility]) [place], [place].     10/30/2019   Final    Critical Value/Significant Value, preliminary result only, called to and read back by   DR VEGA @ 1745. 11/1/2019 AV     10/30/2019 (A)  Final    Cultured on the 4th day of incubation:  Finegoldia magna (Peptostreptococcus otto)     10/30/2019   Final    Critical Value/Significant Value, preliminary result only, called to and read back by  DANI SANCHES RN 0200 11.3.19 NDP     10/30/2019   Final    (Note)  NEGATIVE for the following: Staphylococcus spp., Staph aureus, Staph  epidermidis, Staph  lugdunensis, Streptococcus spp., Strep pneumoniae,  Strep pyogenes, Strep agalactiae, Strep anginosus group, Enterococcus  faecalis, Enterococcus faecium, and Listeria spp. by Verigene  multiplex nucleic acid test. Final identification and antimicrobial  susceptibility testing will be verified by standard methods.    Critical Value/Significant Value called to and read back by  Francis Vays MD @ 1745. 11/1/19. AV.           NEGATIVE for the following: Staphylococcus spp., Staph aureus, Staph  epidermidis, Staph lugdunensis, Streptococcus spp., Strep pneumoniae,  Strep pyogenes, Strep agalactiae, Strep anginosus group, Enterococcus  faecalis, Enterococcus faecium, and Listeria spp. by Verigene  multiplex nucleic acid test. Final identification and antimicrobial  susceptibility testing will be verified by standard methods.    Critical Value/Significant Value called to and read back by DANI SANCHES RN 0503 11.3.19 [facility]     10/29/2019 No growth  Final   10/17/2019 No growth  Final   10/17/2019 No growth  Final   10/01/2019 No growth  Final   10/01/2019 No growth  Final   09/30/2019 No growth  Final   09/29/2019 No growth  Final   09/29/2019 No growth  Final   09/27/2019 (A)  Final    Cultured on the 1st day of incubation:  Granulicatella adiacens     09/27/2019   Final    Critical Value/Significant Value, preliminary result only, called to and read back by  Dr Vyas on 9.28.19 at 1632 bw     09/27/2019 (A)  Final    Cultured on the 1st day of incubation:  Staphylococcus epidermidis     09/27/2019   Final    Critical Value/Significant Value, preliminary result only, called to and read back by  paged to [facility] on 9.28.19 at 2327 bw     09/27/2019 (A)  Final    Cultured on the 1st day of incubation:  Streptococcus salivarius group     09/27/2019   Final    (Note)  POSITIVE for STREPTOCOCCUS SPECIES OTHER THAN pneumococcus, anginosus  group, S. pyogenes and S. agalactiae. Performed using Verigene  multiplex nucleic  acid test. Final identification and antimicrobial  susceptibility testing will be verified by standard methods.    POSITIVE for STAPHYLOCOCCUS EPIDERMIDIS and POSITIVE for the mecA  gene (resistant to methicillin) by [employer] multiplex nucleic acid  test. Final identification and antimicrobial susceptibility testing  will be verified by standard methods.    Specimen tested with Verigene multiplex, gram-positive blood culture  nucleic acid test for the following targets: Staph aureus, Staph  epidermidis, Staph lugdunensis, other Staph species, Enterococcus  faecalis, Enterococcus faecium, Streptococcus species, S. agalactiae,  S. anginosus grp., S. pneumoniae, S. pyogenes, Listeria sp., mecA  (methicillin resistance) and Melvin/B (vancomycin resistance).    Critical Value/Significant Value called to and read back by Estelle Hampton RN 9.27.19 @ 0227 AV     09/27/2019 No growth  Final   07/10/2019 No growth  Final   07/10/2019 No growth  Final       Inflammatory Markers    Recent Labs   Lab Test 01/29/21  2312 12/14/20  1415 11/17/20  1445 07/28/20  1607 05/05/20  1218 04/28/20  1245 11/02/19  1853 10/30/19  1330 05/14/19  1145 05/14/19  1145 01/23/18  0845 01/23/18  0845 01/20/18  1030 09/24/17  1900 09/24/17  1900 06/28/17  2222 06/28/17  2222 03/12/17  0351 11/01/16  1530 11/01/16  1530   SED  --   --   --  10  --   --   --   --   --  13  --  10 11  --  31*  --  26* 17*  --  27*   CRP <2.9 <2.9 <2.9 <2.9 <2.9 <2.9 <2.9 <2.9   < >  --    < > <2.9 5.4   < > 26.6*   < > 53.0* 3.4   < > 8.4*    < > = values in this interval not displayed.       Metabolic Studies       Recent Labs   Lab Test 03/20/21  0555 03/19/21  1643 03/02/21  1225 02/24/21  0724 02/15/21  1240 02/08/21  1445 02/04/21  0540 08/31/20  1822 08/31/20  1822 06/01/18  1807 06/01/18  1807 06/12/17  1315 06/12/17  1315 07/23/13  1036 07/23/13  1036    140 142  --  144 143 142   < > 141   < > 145*   < > 142   < > 143   POTASSIUM 3.6 3.2* 3.4 3.6 4.0 3.4  3.7   < > 4.0   < > 3.5   < > 6.4*   < > 4.0   CHLORIDE 111* 106 109  --  111* 112* 109   < > 112*   < > 109   < > 110*   < > 105   CO2 26 26 26  --  30 27 28   < > 26   < > 27   < > 22   < > 29   ANIONGAP 5 8 7  --  3 4 5   < > 3   < > 10   < > 10   < > 9   BUN 7 8 9  --  16 14 16   < > 13   < > 5*   < > 15   < > 10   CR 0.71 0.68 0.58* 0.70 0.77 0.60* 0.81   < > 0.63*   < > 0.59*   < > 0.52*   < > 0.72   GFRESTIMATED >90 >90 >90 >90 >90 >90 >90   < > >90   < > >90   < > >90  Non  GFR Calc     < > >90   * 97 86  --  87 87 86   < > 82   < > 88   < > 90   < > 88   A1C  --   --   --   --   --   --   --   --   --   --   --   --   --   --  4.9   SILAS 8.1* 8.7 8.6  --  8.3* 8.3* 8.4*   < > 8.1*   < > 8.0*   < > 8.5   < > 8.8   PHOS  --   --  2.9  --  4.2 3.2  --    < >  --    < > 2.9   < > 3.1   < >  --    MAG  --   --  1.9  --  2.0 2.2  --    < >  --    < > 2.0   < > 2.1   < >  --    LACT  --  0.9  --   --   --   --   --   --  1.4   < > 0.9   < >  --    < >  --    CKT  --   --   --   --   --   --   --   --   --   --  52  --  Unsatisfactory specimen - hemolyzed  CALLED TO VALENTINA GUERRA [id number], LY     < >  --     < > = values in this interval not displayed.       Hepatic Studies    Recent Labs   Lab Test 03/19/21  1643 03/02/21  1225 02/15/21  1240 02/08/21  1445 02/03/21  0552 01/29/21  2312   BILITOTAL 0.5 0.3 0.4 0.3 0.4 1.0   ALKPHOS 73 73 67 65 63 69   ALBUMIN 3.5 3.5 3.5 3.5 3.2* 3.4   AST 12 7 15 16 12 19   ALT 26 27 23 23 27 40       Pancreatitis testing    Recent Labs   Lab Test 03/02/21  1225 02/15/21  1240 02/08/21  1445 01/26/21  1230 01/12/21  1300 12/29/20  1015 09/29/19  1559 09/29/19  1559 05/23/19  1606 05/23/19  1606 02/16/19  0224 02/16/19  0224 06/28/17  2222 06/28/17  2222 04/11/16  1842 04/11/16  1842 07/28/15  2309 07/28/15  2309 06/01/15  1928 06/01/15  1928 05/29/15  0034   LIPASE  --   --   --   --   --   --   --  58*  --  117  --  195  --  125  --  102  --  251  --  287  155   TRIG 141 68 49 155* 72 77   < >  --    < >  --    < >  --    < >  --    < >  --    < >  --    < >  --   --     < > = values in this interval not displayed.       Hematology Studies      Recent Labs   Lab Test 03/20/21  0555 03/19/21  1643 03/02/21  1225 02/24/21  0724 02/15/21  1240 02/08/21  1445 01/29/21  2312 01/29/21  2312 01/28/21  1730 11/17/20  1445 11/17/20  1445 09/01/20  0550 09/01/20  0550   WBC 5.8 6.6 8.7 8.4 5.5 6.2   < > 9.4 7.7   < > 4.5   < > 7.6   ANEU 4.6 5.4 6.3  --  2.5 3.3  --  5.7 4.4   < > 2.2   < > 4.0   ALYM 0.5* 0.5* 1.6  --  1.8 1.9  --  2.5 2.2   < > 1.5   < > 2.6   DACIA 0.7 0.7 0.7  --  0.8 0.8  --  1.1 0.9   < > 0.6   < > 0.8   AEOS 0.0  --   --   --   --  0.2  --  0.1 0.1  --  0.2  --  0.2   HGB 12.9* 14.0 13.3 12.4* 11.0* 10.9*   < > 12.1* 12.2*   < > 9.5*   < > 11.3*   HCT 40.3 41.8 39.5* 39.1* 35.0* 34.2*   < > 37.4* 38.6*   < > 31.7*   < > 36.2*   * 147* 186 160 143* 151   < > 239 244   < > 129*   < > 192    < > = values in this interval not displayed.       Arterial Blood Gas Testing    Recent Labs   Lab Test 09/19/17  1929 08/05/15  0957 08/05/15  0845   O2PER 21 RA Results questioned - new specimen has been requested   Unsatisfactory specimen - possible contamination          Urine Studies     Recent Labs   Lab Test 03/19/21  1644 07/28/20  1905 11/03/19  0025 10/04/19  1344 09/29/19  1625   URINEPH 7.5* 6.5 6.0 6.5 7.5*   NITRITE Negative Negative Negative Negative Negative   LEUKEST Negative Negative Negative Trace* Negative   WBCU 1 <1 <1 1 0       Vancomycin Levels     Recent Labs   Lab Test 02/04/21  1415 02/02/21  1306 01/31/21  1249 09/02/20  1049 08/02/20  1537 07/30/20  0430   VANCOMYCIN 19.2 15.9 13.0 24.3 15.8 9.7       Hepatitis B Testing   Recent Labs   Lab Test 09/20/17  0549   HEPBANG Nonreactive       Hepatitis C Testing     Hepatitis C Antibody   Date Value Ref Range Status   09/20/2017 Nonreactive NR^Nonreactive Final     Comment:     Assay  performance characteristics have not been established for newborns,   infants, and children         Respiratory Virus Testing    No results found for: RS, FLUAG    Last check of C difficile  C Diff Toxin B PCR   Date Value Ref Range Status   07/15/2012   Final    Negative: Clostridium difficile target DNA sequences NOT detected, presumed   negative for Clostridium difficile toxin B or the number of bacteria present   may be below the limit of detection for the test.   FDA approved assay performed using [employer] GeneXpert real-time PCR.   A negative result does not exclude actual disease due to Clostridium difficile   and may be due to improper collection, handling and storage of the specimen or   the number of organisms in the specimen is below the detection limit of the   assay.              Imaging:   CXR (03/19):   Impression: No acute airspace disease.

## 2021-03-20 NOTE — PROGRESS NOTES
[facility]    Medicine Progress Note - Hospitalist Service, Gold 11       Date of Admission:  3/19/2021  Assessment & Plan       Parker Acevedo is a 50 year old male with a past medical history of Celestino-en-Y gastric bypass, esophagojejunostomy c/b short gut syndrome, chronic abdominal pain, severe malnutrition on TPN, recurrent line infections, HTN, PUD, and GERD who presents with fever during revision of his Cm CVC by IR. He is now admitted to Internal Medicine for further treatment and monitoring.      Plan for today:  Infectious disease consulted   Cath tip growing staph epidermidis- will continue with vancomycin and cefepime  surveillance culture 3/20 and 3/21   RVP pending   UA not consistent with UTI and no urinary symptoms   Started on fluids with dextrose; will discuss with pharmacy regarding PPN tomorrow   Will need to discuss with IR when cleared by ID to discuss about access options        Concern for CLABSI  Sepsis likely 2/2 CLABSI (pending culture result)  Hx of Recurrent Line Infections  Hx of Recurrent Polymicrobial Bacteremia -   Patient presents with fever during pre-procedure evaluation for Cm line removal and revision. Had headache and not feeling well starting Monday that was when he accidentally cut his cm catheter and was found to be febrile during cm revision so the catheter was pulled out by IR on 3/19  Patient with history of recurrent line infections, with most recent admission 1/29/21 - 2/5/21. Blood cultures at that time positive for E. Faecalis, staph hominis, staph capitis, staph epidermidis, corynebacterium, and aerococcus.  Concerns for recurrent bacteremia 2/2 line infection vs other infectious etiology. COVID 19 negative 3/17.   Prelim culture from catheter tip is growing staph epidermidis   -Continue empiric antibiotics with PTD vancomycin and cefepime 2g Q8H   -Cm catheter and blood cultures pending  -Obtain  urinalysis with culture- no evidence of infection   -Add on chest x-ray to rule out pulmonary source of fever- no evidence of infiltrates   -ID consulted -appreciated recs       Hx of Celestino-en-Y Gastric Bypass  Esophagojejunostomy c/b Short Gut Syndrome  Severe Malnutrition on TPN   Chronic Abdominal Pain - Most recent catheter placed on 2/5 by IR.  Patient notified IR that earlier this week he cut his line and is unable to use it. Plans initially for revision however patient febrile on presentation as above.  His Cm has since been removed.  -Start D5 0.9% NS at 100cc/hr  Currently receives 2050mL TPN Q14H through [facility].  -Will need eventual Cm CVC replacement once clear of fever/infection  -Continue PTA fentanyl 25mcg patch Q48H and oxycodone 5-10mLs TID PRN     -will consult pharmacy to initiate PPN on 3/21 until catheter can be placed   -need to have IR consulted once cleared of infection         HTN - Continue PTA carvedilol 6.25mg BID     ADHD - Continue PTA Adderall 20mg daily     GERD - Continue PTA pantoprazole 40mg daily     Asthma - Continue PTA albuterol inhaler Q4H PRN              Diet:  Regular Adult Diet  DVT Prophylaxis: Pneumatic Compression Devices  Sanchez Catheter: not present  Code Status:  Full Code Status            Disposition Plan   Expected discharge: 2 - 3 days, recommended to prior living arrangement once ab plan has been established and plan for line by IR has been established   Entered: Kristie Tom MD 03/20/2021, 5:49 PM       The patient's care was discussed with the Bedside Nurse.    Kristie Tom MD  Hospitalist Service, [address]  Contact information available via [facility] Paging/Directory  Please see sign in/sign out for up to date coverage information  ______________________________________________________________________    Interval History   Denies any new symptoms   Ongoing headache, fatigue,  some cough; denies chest pain, shortness of breath, urinary symptoms or abdominal pain or diarrhea   Data reviewed today: I reviewed all medications, new labs and imaging results over the last 24 hours. I personally reviewed chest xray with no infiltrates     Physical Exam   Vital Signs: Temp: 99.3  F (37.4  C) Temp src: Oral BP: 126/82 Pulse: 70   Resp: 16 SpO2: 100 % O2 Device: None (Room air)    Weight: 179 lbs 0 oz  General Appearance: Patient in no acute distress   Respiratory: bilateral equal breath sounds with no added sounds   Cardiovascular: s1s2 with no murmur   GI: soft, non tender, bowel sounds noted; J tube in place for venting   Skin: no rash   Other: AAOX3 ,b/L UE and LE-5/5     Data   Recent Labs   Lab 03/20/21  0555 03/19/21  1643   WBC 5.8 6.6   HGB 12.9* 14.0   MCV 94 93   * 147*    140   POTASSIUM 3.6 3.2*   CHLORIDE 111* 106   CO2 26 26   BUN 7 8   CR 0.71 0.68   ANIONGAP 5 8   SILAS 8.1* 8.7   * 97   ALBUMIN  --  3.5   PROTTOTAL  --  7.0   BILITOTAL  --  0.5   ALKPHOS  --  73   ALT  --  26   AST  --  12

## 2021-03-20 NOTE — PROGRESS NOTES
Ky called from [facility] for update. Patient approved to give information regarding care.

## 2021-03-20 NOTE — PLAN OF CARE
Pain: Patient resting in bed, pain is managed by PRN oxy  Neuro: Patient A&O x4, neuros intact.  Cardiac: WDL.  Respiratory: WNL  GI/Diet/Appetite: Diminished appetite at baseline, pt states nausea relieved with zofran.  LDA's: PIV in right arm SL  Skin:  J tube intact, pt states he drains tube independently when feeling nauseous.  Activity: Independent

## 2021-03-20 NOTE — PLAN OF CARE
Shift: 5614-2823    Neuro: A&Ox4. Able to make needs known  Cardiac: febrile, temp normalized overnight, increased to 102.6 again this AM, tylenol given   Respiratory: WNL  GI/: continued abdominal pain/discomfort, voids without difficulty  Diet/appetite: poor appetite. Pt is usually on TPN. Drank 3 apple juices overnight  Activity: independent  Pain: pt reports consistent pain in abdomen radiating to back. Oxycodone given  Skin: no new deficits noted.   Lines: PIV R forearm infusing D5 in NS w/ potassium. PIV L forearm infusing vanco. J tube LUQ clean, dry, open to air.     Vitals: /77   Pulse 86   Temp 102.6  F (39.2  C) (Oral)   Resp 16   Wt 81.2 kg (179 lb)   SpO2 98%   BMI 24.97 kg/m      Pt was given oral benadryl prior to start of vanco infusion per pt request.

## 2021-03-20 NOTE — PROGRESS NOTES
Pain: Patient resting in bed and stated that his pain is tolerable  Neuro: Patient AO x4, neuros intact.  Cardiac: WDL.  Respiratory: WNL  GI/Diet/Appetite:  Lunch in the room and patient does not want to eat now, wants to wait till later.  LDA's: PIV in right arm SL  Skin:  J tube intact  Activity: Independent

## 2021-03-20 NOTE — PROGRESS NOTES
Shift: 15:00 - 23:30  VS: /80   Pulse 84   Temp 101.2  F (38.4  C) (Oral)   Resp 16   Wt 81.2 kg (179 lb)   SpO2 95%   BMI 24.97 kg/m    Pain: Patient c/o pain 6/10. Oxycodone and tylenol given as prescribed.  Neuro: Patient AO x4, neuros intact.  Cardiac: WDL, apical pulse.  Respiratory:  WDL, clear bilaterally.  GI/Diet/Appetite:  Patient stated he lost weight as does not have an appetite. He didn't eat anything during the time of his admission. Admitted 2215  LDA's: PIV in right arm running D5W w/ 0.9 nacl and potassium.  Skin: Jtube and some scarring on abdomen.  Activity: Independent  Tests/Procedures: Awaiting blood cultures

## 2021-03-21 LAB
ANION GAP SERPL CALCULATED.3IONS-SCNC: 6 MMOL/L (ref 3–14)
BUN SERPL-MCNC: 9 MG/DL (ref 7–30)
CALCIUM SERPL-MCNC: 8 MG/DL (ref 8.5–10.1)
CHLORIDE SERPL-SCNC: 111 MMOL/L (ref 94–109)
CO2 SERPL-SCNC: 26 MMOL/L (ref 20–32)
CREAT SERPL-MCNC: 0.66 MG/DL (ref 0.66–1.25)
CRP SERPL-MCNC: 45 MG/L (ref 0–8)
ERYTHROCYTE [DISTWIDTH] IN BLOOD BY AUTOMATED COUNT: 16 % (ref 10–15)
GFR SERPL CREATININE-BSD FRML MDRD: >90 ML/MIN/{1.73_M2}
GLUCOSE BLDC GLUCOMTR-MCNC: 105 MG/DL (ref 70–99)
GLUCOSE BLDC GLUCOMTR-MCNC: 122 MG/DL (ref 70–99)
GLUCOSE BLDC GLUCOMTR-MCNC: 96 MG/DL (ref 70–99)
GLUCOSE SERPL-MCNC: 99 MG/DL (ref 70–99)
HCT VFR BLD AUTO: 36.1 % (ref 40–53)
HGB BLD-MCNC: 11.4 G/DL (ref 13.3–17.7)
MCH RBC QN AUTO: 29.8 PG (ref 26.5–33)
MCHC RBC AUTO-ENTMCNC: 31.6 G/DL (ref 31.5–36.5)
MCV RBC AUTO: 95 FL (ref 78–100)
PLATELET # BLD AUTO: 117 10E9/L (ref 150–450)
POTASSIUM SERPL-SCNC: 3.2 MMOL/L (ref 3.4–5.3)
RBC # BLD AUTO: 3.82 10E12/L (ref 4.4–5.9)
SODIUM SERPL-SCNC: 142 MMOL/L (ref 133–144)
VANCOMYCIN SERPL-MCNC: 10.2 MG/L
WBC # BLD AUTO: 4.4 10E9/L (ref 4–11)

## 2021-03-21 PROCEDURE — 85027 COMPLETE CBC AUTOMATED: CPT | Performed by: INTERNAL MEDICINE

## 2021-03-21 PROCEDURE — 999N001017 HC STATISTIC GLUCOSE BY METER IP

## 2021-03-21 PROCEDURE — 99233 SBSQ HOSP IP/OBS HIGH 50: CPT | Performed by: INTERNAL MEDICINE

## 2021-03-21 PROCEDURE — 36415 COLL VENOUS BLD VENIPUNCTURE: CPT | Performed by: INTERNAL MEDICINE

## 2021-03-21 PROCEDURE — 86140 C-REACTIVE PROTEIN: CPT | Performed by: INTERNAL MEDICINE

## 2021-03-21 PROCEDURE — 258N000003 HC RX IP 258 OP 636: Performed by: INTERNAL MEDICINE

## 2021-03-21 PROCEDURE — 250N000011 HC RX IP 250 OP 636: Performed by: INTERNAL MEDICINE

## 2021-03-21 PROCEDURE — 80202 ASSAY OF VANCOMYCIN: CPT | Performed by: INTERNAL MEDICINE

## 2021-03-21 PROCEDURE — 120N000002 HC R&B MED SURG/OB UMMC

## 2021-03-21 PROCEDURE — 250N000013 HC RX MED GY IP 250 OP 250 PS 637: Performed by: INTERNAL MEDICINE

## 2021-03-21 PROCEDURE — 80048 BASIC METABOLIC PNL TOTAL CA: CPT | Performed by: INTERNAL MEDICINE

## 2021-03-21 PROCEDURE — 87040 BLOOD CULTURE FOR BACTERIA: CPT | Performed by: INTERNAL MEDICINE

## 2021-03-21 PROCEDURE — 250N000013 HC RX MED GY IP 250 OP 250 PS 637: Performed by: PHYSICIAN ASSISTANT

## 2021-03-21 PROCEDURE — 250N000013 HC RX MED GY IP 250 OP 250 PS 637: Performed by: NURSE PRACTITIONER

## 2021-03-21 PROCEDURE — 99233 SBSQ HOSP IP/OBS HIGH 50: CPT | Mod: GC | Performed by: INTERNAL MEDICINE

## 2021-03-21 PROCEDURE — 250N000011 HC RX IP 250 OP 636: Performed by: PHYSICIAN ASSISTANT

## 2021-03-21 RX ORDER — DEXTROSE MONOHYDRATE 100 MG/ML
INJECTION, SOLUTION INTRAVENOUS CONTINUOUS PRN
Status: DISCONTINUED | OUTPATIENT
Start: 2021-03-21 | End: 2021-03-21

## 2021-03-21 RX ADMIN — VANCOMYCIN HYDROCHLORIDE 2000 MG: 10 INJECTION, POWDER, LYOPHILIZED, FOR SOLUTION INTRAVENOUS at 21:27

## 2021-03-21 RX ADMIN — FENTANYL 1 PATCH: 25 PATCH, EXTENDED RELEASE TRANSDERMAL at 08:15

## 2021-03-21 RX ADMIN — LORAZEPAM 1 MG: 2 LIQUID ORAL at 18:04

## 2021-03-21 RX ADMIN — CARVEDILOL 6.25 MG: 6.25 TABLET, FILM COATED ORAL at 08:15

## 2021-03-21 RX ADMIN — PANTOPRAZOLE SODIUM 40 MG: 40 TABLET, DELAYED RELEASE ORAL at 08:15

## 2021-03-21 RX ADMIN — POLYETHYLENE GLYCOL 3350 17 G: 17 POWDER, FOR SOLUTION ORAL at 11:08

## 2021-03-21 RX ADMIN — CEFEPIME HYDROCHLORIDE 2 G: 2 INJECTION, POWDER, FOR SOLUTION INTRAVENOUS at 12:34

## 2021-03-21 RX ADMIN — DIPHENHYDRAMINE HYDROCHLORIDE 25 MG: 25 SOLUTION ORAL at 08:28

## 2021-03-21 RX ADMIN — DIPHENHYDRAMINE HYDROCHLORIDE 25 MG: 25 SOLUTION ORAL at 20:43

## 2021-03-21 RX ADMIN — OXYCODONE HYDROCHLORIDE 10 MG: 5 SOLUTION ORAL at 18:44

## 2021-03-21 RX ADMIN — CARVEDILOL 6.25 MG: 6.25 TABLET, FILM COATED ORAL at 17:09

## 2021-03-21 RX ADMIN — Medication 1 PATCH: at 17:19

## 2021-03-21 RX ADMIN — ONDANSETRON 4 MG: 4 TABLET, ORALLY DISINTEGRATING ORAL at 17:08

## 2021-03-21 RX ADMIN — SUCRALFATE 1 G: 1 SUSPENSION ORAL at 17:08

## 2021-03-21 RX ADMIN — OXYCODONE HYDROCHLORIDE 10 MG: 5 SOLUTION ORAL at 15:29

## 2021-03-21 RX ADMIN — ERGOCALCIFEROL 50000 UNITS: 1.25 CAPSULE, LIQUID FILLED ORAL at 08:28

## 2021-03-21 RX ADMIN — VANCOMYCIN HYDROCHLORIDE 2000 MG: 10 INJECTION, POWDER, LYOPHILIZED, FOR SOLUTION INTRAVENOUS at 09:21

## 2021-03-21 RX ADMIN — DEXTROAMPHETAMINE SACCHARATE, AMPHETAMINE ASPARTATE, DEXTROAMPHETAMINE SULFATE AND AMPHETAMINE SULFATE 20 MG: 2.5; 2.5; 2.5; 2.5 TABLET ORAL at 08:16

## 2021-03-21 RX ADMIN — SUCRALFATE 1 G: 1 SUSPENSION ORAL at 04:10

## 2021-03-21 RX ADMIN — OXYCODONE HYDROCHLORIDE 10 MG: 5 SOLUTION ORAL at 11:08

## 2021-03-21 RX ADMIN — OXYCODONE HYDROCHLORIDE 10 MG: 5 SOLUTION ORAL at 22:05

## 2021-03-21 RX ADMIN — OXYCODONE HYDROCHLORIDE 10 MG: 5 SOLUTION ORAL at 08:16

## 2021-03-21 RX ADMIN — QUETIAPINE 25 MG: 25 TABLET ORAL at 22:05

## 2021-03-21 RX ADMIN — ONDANSETRON 4 MG: 4 TABLET, ORALLY DISINTEGRATING ORAL at 11:08

## 2021-03-21 RX ADMIN — SODIUM CHLORIDE, SODIUM LACTATE, POTASSIUM CHLORIDE, CALCIUM CHLORIDE AND DEXTROSE MONOHYDRATE: 5; 600; 310; 30; 20 INJECTION, SOLUTION INTRAVENOUS at 15:28

## 2021-03-21 RX ADMIN — OXYCODONE HYDROCHLORIDE 10 MG: 5 SOLUTION ORAL at 04:12

## 2021-03-21 RX ADMIN — CEFEPIME HYDROCHLORIDE 2 G: 2 INJECTION, POWDER, FOR SOLUTION INTRAVENOUS at 03:52

## 2021-03-21 ASSESSMENT — ACTIVITIES OF DAILY LIVING (ADL)
ADLS_ACUITY_SCORE: 11

## 2021-03-21 NOTE — PROVIDER NOTIFICATION
Provider paged and notified of the following: Critical Result: Blood cultures on 3/19 1703 L) Arm coming back Gram + Cocci in clusters.         03/21/21 1300   Critical Test Results/Notification   Critical Lab Result (Lab Name and Value) Blood Cultures Gram + cocci clusters   What Time Did The Lab Notify You? 1307   What Provider Did You Notify? Kristie Tom MD   Was the Provider Notified Within 30 Min?  Yes

## 2021-03-21 NOTE — PROVIDER NOTIFICATION
Provider notified and paged of the following critical results: Lab called on update on Blood Culture drawn on 3/19 at 1703 from L) arm. Positive for Staphylococcus epidermidis.         03/21/21 1552   Critical Test Results/Notification   Critical Lab Result (Lab Name and Value) Blood Culture Positive for Staphylococcus epidermidis   What Time Did The Lab Notify You? 1552   What Provider Did You Notify? Kristie Tom MD   Was the Provider Notified Within 30 Min?  Yes

## 2021-03-21 NOTE — PROGRESS NOTES
[facility] PROGRESS NOTE     Patient:  Parker Acevedo   YOB: 1970, MRN: [id number]  Date of Visit: 03/21/2021  Date of Admission: 3/19/2021  Consult Requester: Kristie Tom MD          Assessment and Recommendations:   ID Problem List:  1. Sepsis, with concern for central line infection from Cm catheter:  - s/p removal on 03/19  - preliminary culture data thus far with >100 colonies of staph epidermidis (from 03/19 catheter tip culture) & 1 BC with GPC's in clusters (03/19 peripheral culture).  - Hx of previous frequent polymicrobial line infections  2. Prior Celestino-en-Y gastric bypass, esophagojejunostomy:  - Complicated by short gut syndrome and chronic malnutrition requiring TPN.    Recommendations:  1. Continue with vancomycin as ordered for now.  2. Discontinue cefepime.   3. Repeat one additional set of BC tomorrow, 03/22.   4. Will continue to follow pending BC/susceptibilities to help guide final antibiotic plan and timing of line re-placement.    Discussion:  Parker Acevedo is a 50 year old male with a PMH of Celestino-en-Y gastric bypass and esophagojejunostomy complicated by short gut syndrome and chronic malnutrition requiring TPN, as well as several incidents of polymicrobial central line infection resulting in line replacements; admitted on 03/19 with suspected recurrent episode. Patient inadvertently cut his cm catheter at home and shortly thereafter developed multiple systemic complaints of fevers/chills/mylagias/weakness/headache. He underwent IR guided line removal on 03/19, at which point he was noted to be febrile and sent to ED. Certainly, this presentation once again does raise concern for potential recurrent line infection, especially in setting of him accidentally tampering with it at home. Preliminary BC (1 from catheter tip & 1 from peripheral) thus far are positive for staph epidermidis. Patient has continued to spike fevers, but  otherwise is generally overall well appearing and clinically stable. At this point in time we are comfortable discontinuing the cefepime and continuing with monotherapy with vancomycin. We will await final susceptibilities and additional culture data to help guide antibiotic plan / timing for line re-placement.     Thank you for the consult. ID will continue to follow with you.      Elvira Greer DO, MPH  Infectious Disease Fellow, PGY-4   Pager: [phone]   2021         Interval History and Events:   Chart reviewed and patient seen and examined. Notes that last night he did have a fever and felt unwell doing that time, otherwise today is generally feeling better compared to yesterday. No complaints at time of evaluation. T max of 102.6 yesterday afternoon, fever of 101.7 last night, otherwise has remained HD stable. WBC count remains WNL, respiratory viral pathogen panel was negative. 2 BC from  (one from catheter tip and one from periphery) are positive for staph epidermidis / GPC's in clusters. Remainder with NGTD.          Antimicrobial Treatment:   - Clindamycin ()  - Cefepime ( - )  - Vancomycin ( - )         Review of Systems:   Targeted 4 point ROS was completed with pertinent positives and negatives are detailed above.         HPI:   Adopted from initial consult note on :    Parker Acevedo is a 50 year old male with a PMH of Celestino-en-Y gastric bypass and esophagojejunostomy complicated by short gut syndrome and chronic malnutriion requiring TPN, as well as several incidents of polymicrobial central line infection resulting in line replacements; admitted on  with suspected recurrent episode. Patient reports that he was at home cutting out pictures for a banner/poster for a family member's  a few days ago when he inadvertently cut his azevedo catheter. He notes that his wife capped it and dressed it immediately. However, shortly thereafter, patient began feeling  generally unwell. He describes developing onset of subjective fevers/chills, as well as myalgias, generalized weakness, and headache. Patient was able to schedule an appointment yesterday, 03/19 for catheter replacement. However, while there, he was noted to be febrile and was subsequently sent to ED for evaluation. Line was removed at that time but not replaced. Prior to that, his azevedo catheter had been replaced on 02/05 following an admission 01/29 - 02/05 for polymicrobial line infection. In review of EMR, patient has had multiple admissions with similar presentation and does have history of multiple previous line infections with a variety of organisms including: Streptococcus salivarius, Streptococcus mitis, Staphylococcus hominis, Stahylococcus capitis, Staphylococcus epidermidis, Enterococcus faecalis, Aerococcus viridans, Staphylococcus lugdunensis, Psychrobacter faecalis, Corynebacterium,  Finegoldia magna, Granulicatella adiacens, Streptococcus salivarius, Rothia mucilaginosa, Candida albicans. Patient notes that his current symptoms feel similar in nature to previous line infections. He otherwise denies any respiratory complaints, GI symptoms, urinary symptoms, or other issues at this time. Patient does have two dogs in the home, but reports that neither have been near his catheter site. No additional concerns or complaints at this time.     On arrival, patient was noted to be febrile up to 102.6, otherwise has remained HD stable. No leukocytosis and CMP, procalcitonin, and lactic acid have all been grossly normal.  CXR was obtained on admission was largely unremarkable; COVID-19 PCR negative. Patient received an intra-procedure dose of clindamycin and currently remains on cefepime/vancomycin.          Physical Examination:   Temp:  [98.6  F (37  C)-101.7  F (38.7  C)] 98.8  F (37.1  C)  Pulse:  [60-73] 60  Resp:  [16-18] 16  BP: (118-143)/(78-93) 126/91  SpO2:  [95 %-99 %] 99 %    No intake/output data  recorded.    Vitals:    03/19/21 1444   Weight: 81.2 kg (179 lb)       Constitutional: Adult male seen resting comfortably in bed. Overall non-toxic in appearance. Pleasant and interactive.   HEENT: NC/AT,sclera clear, conjunctiva normal.  Respiratory: No increased work of breathing, CTAB, no crackles or wheezing.  Cardiovascular: RRR, no murmur noted. No peripheral edema.  GI: Normal bowel sounds, soft, non-distended and non-tender.  Skin: Warm, dry, well-perfused. No active drainage at previous azevedo catheter. Otherwise, no bruising, bleeding, rashes, or lesions on limited exam.  Musculoskeletal: Extremities grossly normal, non-tender, no edema. Good strength and ROM in bed.   Neurologic: A&O. Answers questions appropriately, speech normal. Moves all extremities spontaneously.  Neuropsychiatric: Calm. Affect appropriate to situation.  Vascular access: PIV in place are CDI, non-tender, no surrounding erythema.         Medications:       amphetamine-dextroamphetamine  20 mg Oral Daily     carvedilol  6.25 mg Oral BID w/meals     ceFEPIme (MAXIPIME) IV  2 g Intravenous Q8H     fentaNYL  25 mcg Transdermal Q72H     fentaNYL   Transdermal Q8H     Lidocaine  1 patch Transdermal Q24h     lidocaine   Transdermal Q8H     nicotine  1 patch Transdermal Q24H     nicotine   Transdermal Q8H     pantoprazole  40 mg Oral Daily     polyethylene glycol  17 g Oral BID     QUEtiapine  25 mg Oral At Bedtime     vancomycin (VANCOCIN) IV  2,000 mg Intravenous Q12H     vitamin D2  50,000 Units Oral Weekly       Antiinfectives:  Anti-infectives (From now, onward)    Start     Dose/Rate Route Frequency Ordered Stop    03/21/21 0930  vancomycin (VANCOCIN) 2,000 mg in sodium chloride 0.9 % 500 mL intermittent infusion      2,000 mg  over 3 Hours Intravenous EVERY 12 HOURS 03/21/21 0858      03/20/21 0100  ceFEPIme (MAXIPIME) 2 g vial to attach to  ml bag for ADULTS or 50 ml bag for PEDS      2 g  over 30 Minutes Intravenous EVERY 8  HOURS 03/19/21 1822            Infusions/Drips:    dextrose 5% lactated ringers 100 mL/hr at 03/21/21 1528            Laboratory Data:   No results found for: ACD4    Microbiology:  Culture Micro   Date Value Ref Range Status   03/21/2021 No growth after 7 hours  Preliminary   03/21/2021 No growth after 7 hours  Preliminary   03/20/2021 No growth after 22 hours  Preliminary   03/20/2021 No growth after 22 hours  Preliminary   03/19/2021 (A)  Preliminary    Cultured on the 2nd day of incubation:  Gram positive cocci in clusters     03/19/2021   Preliminary    Critical Value/Significant Value, preliminary result only, called to and read back by  Jerad Miranda RN at 13:05pm 3/21/21      03/19/2021   Preliminary    (Note)  POSITIVE for STAPHYLOCOCCUS EPIDERMIDIS and NEGATIVE for the mecA  gene (not resistant to methicillin) by Verigene nucleic acid test.  The mecA gene was not detected. Final identification and  antimicrobial susceptibility testing will be verified by standard  methods.    Specimen tested with Verigene multiplex, gram-positive blood culture  nucleic acid test for the following targets: Staph aureus, Staph  epidermidis, Staph lugdunensis, other Staph species, Enterococcus  faecalis, Enterococcus faecium, Streptococcus species, S. agalactiae,  S. anginosus grp., S. pneumoniae, S. pyogenes, Listeria sp., mecA  (methicillin resistance) and Melvin/B (vancomycin resistance).    Critical Value/Significant Value called to and read back by Jerad Miranda RN at 1552 on 3.21.21 CW     03/19/2021 No growth after 2 days  Preliminary   03/19/2021 (A)  Preliminary    >100 colonies  Staphylococcus epidermidis  Susceptibility testing in progress     03/19/2021 Culture negative monitoring continues  Preliminary       Inflammatory Markers    Recent Labs   Lab Test 03/21/21  0734 01/29/21  2312 12/14/20  1415 07/28/20  1607 07/28/20  1607 05/14/19  1145 05/14/19  1145 01/23/18  0845 01/23/18  0845   SED  --   --   --   --   10  --  13  --  10   CRP 45.0* <2.9 <2.9   < > <2.9   < >  --    < > <2.9    < > = values in this interval not displayed.       Metabolic Studies     Recent Labs   Lab Test 03/21/21  0734 03/20/21  0555 03/19/21  1643 03/02/21  1225 06/01/18  1807 06/01/18  1807 07/23/13  1036 07/23/13  1036    141 140 142   < > 145*   < > 143   POTASSIUM 3.2* 3.6 3.2* 3.4   < > 3.5   < > 4.0   CHLORIDE 111* 111* 106 109   < > 109   < > 105   CO2 26 26 26 26   < > 27   < > 29   ANIONGAP 6 5 8 7   < > 10   < > 9   BUN 9 7 8 9   < > 5*   < > 10   CR 0.66 0.71 0.68 0.58*   < > 0.59*   < > 0.72   GFRESTIMATED >90 >90 >90 >90   < > >90   < > >90   GLC 99 123* 97 86   < > 88   < > 88   A1C  --   --   --   --   --   --   --  4.9   SILAS 8.0* 8.1* 8.7 8.6   < > 8.0*   < > 8.8   PHOS  --   --   --  2.9   < > 2.9   < >  --    MAG  --   --   --  1.9   < > 2.0   < >  --    LACT  --   --  0.9  --    < > 0.9   < >  --    CKT  --   --   --   --   --  52   < >  --     < > = values in this interval not displayed.       Hepatic Studies    Recent Labs   Lab Test 03/19/21  1643 03/02/21  1225 02/15/21  1240   BILITOTAL 0.5 0.3 0.4   ALKPHOS 73 73 67   ALBUMIN 3.5 3.5 3.5   AST 12 7 15   ALT 26 27 23       Pancreatitis testing    Recent Labs   Lab Test 03/02/21  1225 09/29/19  1559 09/29/19  1559 05/23/19  1606 05/23/19  1606   LIPASE  --   --  58*  --  117   TRIG 141   < >  --    < >  --     < > = values in this interval not displayed.       Hematology Studies      Recent Labs   Lab Test 03/21/21  0734 03/20/21  0555 03/19/21  1643   WBC 4.4 5.8 6.6   ANEU  --  4.6 5.4   ALYM  --  0.5* 0.5*   DACIA  --  0.7 0.7   AEOS  --  0.0  --    HGB 11.4* 12.9* 14.0   HCT 36.1* 40.3 41.8   * 133* 147*       Arterial Blood Gas Testing    Recent Labs   Lab Test 09/19/17  1929   O2PER 21        Urine Studies     Recent Labs   Lab Test 03/19/21  1644 07/28/20  1905 11/03/19  0025   URINEPH 7.5* 6.5 6.0   NITRITE Negative Negative Negative   LEUKEST Negative  Negative Negative   WBCU 1 <1 <1       Vancomycin Levels     Recent Labs   Lab Test 03/21/21  0734 02/04/21  1415 02/02/21  1306   VANCOMYCIN 10.2 19.2 15.9       Last check of C difficile  C Diff Toxin B PCR   Date Value Ref Range Status   07/15/2012   Final    Negative: Clostridium difficile target DNA sequences NOT detected, presumed   negative for Clostridium difficile toxin B or the number of bacteria present   may be below the limit of detection for the test.   FDA approved assay performed using [employer] real-time PCR.   A negative result does not exclude actual disease due to Clostridium difficile   and may be due to improper collection, handling and storage of the specimen or   the number of organisms in the specimen is below the detection limit of the   assay.            Imaging:   CXR (03/19):   Impression: No acute airspace disease.

## 2021-03-21 NOTE — PROGRESS NOTES
CLINICAL NUTRITION SERVICES - ASSESSMENT NOTE     Nutrition Prescription    RECOMMENDATIONS FOR MDs/PROVIDERS TO ORDER:  If unable to resume TPN within 3 days, recommend re-discussing peripheral PN (will have been 10 days without nutrition support/adequate nutrition).     Malnutrition Status:    Severe malnutrition in the context of acute on chronic illness    Recommendations already ordered by Registered Dietitian (RD):  Boost Plus + Powerade BID between meals    Future/Additional Recommendations:  PPN recommendations if needed: Start peripheral PN with Clinimix peripheral formula @ 100 ml/hr + 250 ml intralipid daily   -120 grams dex (408 kcal), 102 grams protein (408 kcal) + 250 ml IV lipids daily  -provides 1316 kcal (17 kcal/kg), 1.3g/kg protein    Due to long term PN reliance, recommend transitioning to SMOF lipid in outpatient setting to allow for more calories from fat with less concern for liver function/TG abnormality.       REASON FOR ASSESSMENT  Parker Acevedo is a/an 50 year old male assessed by the dietitian for Pharmacy/Nutrition to Start and Manage PN (requires peripheral formula), short gut    NUTRITION HISTORY  PMH includes Celestino-en-Y gastric bypass, esophagojejunostomy c/b short gut syndrome on TPN with hx of recurrent line infections.      Home TPN regimen: 875-1000 ml daily (14 hr cycle), Dex 150 grams,  grams + 125 ml IV lipids 3x/week -> provides an average of 1017 kcal (13 kcal/kg), 1.3g/kg protein, 11% kcal from IV lipid  -TPN regimen meets ~50% kcal needs, 100% pro needs  -Contains 20 mg famotidine  He accidentally cut his catheter, and has not received TPN over the last ~7 days.     Patient reports having reduced PO intake from November 2020 to February 2021 d/t not having a G-tube for drainage and therefore having nausea/abdominal discomfort with eating.  He also had COVID in November which affected his taste/smell and ability to eat.  He notes that sometimes his TPN causes him to  "feel nauseated or have reflux.      His struggles with reflux, nausea/vomiting and dumping syndrome associated with eating.  He reports that there isn't a single food he can always tolerate, noting some days a certain food will make him vomit and then randomly he will be able to tolerate it.  Liquids generally are better tolerated than solid foods.  He sometimes sips on Boost and Gatorade.  Overall, it seems like he tolerates very little nutrition orally at baseline.      CURRENT NUTRITION ORDERS  Diet: Regular  D5 @ 100 ml/hr   IV lipids 250 ml daily  *Per updated MD note, patient refusing PPN d/t needing IV access for antibiotics.  Patient reports to writer that he is worried that he will lose his IV access and be delayed in his recovery if he cannot receive antibiotics.  He notes has had too many peripheral lines and getting access is difficult for him.     Intake/Tolerance: Not hungry and doesn't feel like he would tolerate food well if he pushed it.      LABS  K+ 3.2 (low)  CRP 45 (elevated slightly)    MEDICATIONS  Vitamin D 50,000 international unit(s) weekly   Miralax BID  Carafate QID PRN  Protonix daily    ANTHROPOMETRICS  Height: 5'11\"  Most Recent Weight: 81.2 kg (179 lb)    IBW: 78.2 kg  BMI: Normal BMI  Weight History: TPN DW = 76 kg.  Weight loss of 26# (12.6%) within the last ~6 months.    Wt Readings from Last 15 Encounters:   03/19/21 81.2 kg (179 lb)   02/24/21 85.6 kg (188 lb 11.4 oz)   02/22/21 84.4 kg (186 lb)   02/05/21 85.6 kg (188 lb 12.8 oz)   11/09/20 87.1 kg (192 lb)   09/03/20 90.4 kg (199 lb 3.2 oz)   08/27/20 92.9 kg (204 lb 14.4 oz)   08/02/20 93.8 kg (206 lb 14.4 oz)   04/14/20 82.1 kg (181 lb)   04/07/20 97.3 kg (214 lb 9.6 oz)   03/26/20 90.3 kg (199 lb)   02/21/20 78.9 kg (174 lb)   12/19/19 86.6 kg (191 lb)   12/08/19 78.9 kg (174 lb)   11/25/19 90.7 kg (200 lb)   Dosing Weight: 76 kg (TPN dosing weight)    ASSESSED NUTRITION NEEDS  Estimated Energy Needs: 5615-7101 kcals/day (25 " - 30 kcals/kg)+  Justification: Maintenance on TPN, increased needs via EN d/t malabsorption  Estimated Protein Needs: 65-81 grams protein/day (0.8 - 1 grams of pro/kg)+  Justification: Maintenance on TPN, increased needs via EN d/t malabsorption  Estimated Fluid Needs: 1 mL/kcal  Justification: Maintenance    PHYSICAL FINDINGS  See malnutrition section below.    MALNUTRITION  % Intake: </= 50% for >/= 5 days (severe)  % Weight Loss: > 10% in 6 months (severe)  Subcutaneous Fat Loss: Facial region:  moderate and Thoracic/intercostal: moderate  Muscle Loss: Temporal:  moderate, Facial & jaw region: moderate, Thoracic region (clavicle, acromium bone, deltoid, trapezius, pectoral): mild and Upper arm (bicep, tricep): moderate  Fluid Accumulation/Edema: None noted  Malnutrition Diagnosis: Severe malnutrition in the context of acute on chronic illness    NUTRITION DIAGNOSIS  Inadequate protein-energy intake related to reduced PO intake/tolerance and no PN x 7 days as evidenced by lack of PN access and recent issues with G-tube, weight loss of 12% in 6 months.     INTERVENTIONS  Implementation  Nutrition Education: Provided education on RD role, recommendation for avoiding going >7-10 days without nutrition to avoid lean body mass wasting.    Collaboration with other providers - Obtained PN formula from [facility]  Medical food supplement therapy - see above    Goals  Resume PN within 3 days.     Monitoring/Evaluation  Progress toward goals will be monitored and evaluated per protocol.    Nicolette Esqueda MS, RD, LD  Pager [phone]

## 2021-03-21 NOTE — PROVIDER NOTIFICATION
Provider MD Vaishali paged of the following: Pt is wondering if he can have is ativan now instead of at bedtime?        Provider returned call and stated it was okay to give ativan now.

## 2021-03-21 NOTE — PROGRESS NOTES
[facility] [place]    Medicine Progress Note - Hospitalist Service, Gold 11       Date of Admission:  3/19/2021  Assessment & Plan       Parker Acevedo is a 50 year old male with a past medical history of Celestino-en-Y gastric bypass, esophagojejunostomy c/b short gut syndrome, chronic abdominal pain, severe malnutrition on TPN, recurrent line infections, HTN, PUD, and GERD who presents with fever during revision of his Cm CVC by IR. He is now admitted to Internal Medicine for further treatment and monitoring.      Plan for today:  Infectious disease consulted   Cath tip growing staph epidermidis- will continue with vancomycin and cefepime for now   surveillance culture 3/20 and 3/19 with no growth so far   RVP -negative   UA not consistent with UTI and no urinary symptoms   Started on fluids with dextrose; patient refused PPN for now given need for ab and IV access, will hold off for now   Will need to discuss with IR when cleared by ID to discuss about access options        Concern for CLABSI  Sepsis likely 2/2 CLABSI (pending culture result)  Hx of Recurrent Line Infections  Hx of Recurrent Polymicrobial Bacteremia -   Patient presents with fever during pre-procedure evaluation for Cm line removal and revision. Had headache and not feeling well starting Monday that was when he accidentally cut his cm catheter and was found to be febrile during cm revision so the catheter was pulled out by IR on 3/19  Patient with history of recurrent line infections, with most recent admission 1/29/21 - 2/5/21. Blood cultures at that time positive for E. Faecalis, staph hominis, staph capitis, staph epidermidis, corynebacterium, and aerococcus.  Concerns for recurrent bacteremia 2/2 line infection vs other infectious etiology. COVID 19 negative 3/17.   Prelim culture from catheter tip is growing staph epidermidis -pending susceptibility   -Continue empiric antibiotics with  PTD vancomycin and cefepime 2g Q8H   -Cm catheter and blood cultures negative so far   -Obtain urinalysis with culture- no evidence of infection   -Add on chest x-ray to rule out pulmonary source of fever- no evidence of infiltrates   -ID consulted -appreciated recs       Hx of Celestino-en-Y Gastric Bypass  Esophagojejunostomy c/b Short Gut Syndrome  Severe Malnutrition on TPN   Chronic Abdominal Pain - Most recent catheter placed on 2/5 by IR.  Patient notified IR that earlier this week he cut his line and is unable to use it. Plans initially for revision however patient febrile on presentation as above.  His Cm has since been removed.  -Start D5 0.9% NS at 100cc/hr  Currently receives 2050mL TPN Q14H through [facility].  -Will need eventual Cm CVC replacement once clear of fever/infection  -Continue PTA fentanyl 25mcg patch Q48H and oxycodone 5-10mLs TID PRN     -patient refused PPN for now   -need to have IR consulted once cleared of infection         HTN - Continue PTA carvedilol 6.25mg BID     ADHD - Continue PTA Adderall 20mg daily     GERD - Continue PTA pantoprazole 40mg daily     Asthma - Continue PTA albuterol inhaler Q4H PRN              Diet:  Regular Adult Diet  DVT Prophylaxis: Pneumatic Compression Devices  Sanchez Catheter: not present  Code Status:  Full Code Status            Disposition Plan   Expected discharge: 2 - 3 days, recommended to prior living arrangement once ab plan has been established and plan for line by IR has been established   Entered: Kristie Tom MD 03/21/2021, 9:07 AM       The patient's care was discussed with the Bedside Nurse.    Kristie Tom MD  Hospitalist Service, [address]  Contact information available via [facility] Paging/Directory  Please see sign in/sign out for up to date coverage information  ______________________________________________________________________    Interval History    Denies any new symptoms   Ongoing headache, fatigue; denies chest pain, shortness of breath, urinary symptoms or abdominal pain or diarrhea   Felt his temp going up to 101 when ab was off for few hours yesterday   Data reviewed today: I reviewed all medications, new labs and imaging results over the last 24 hours. I personally reviewed chest xray with no infiltrates     Physical Exam   Vital Signs: Temp: 98.7  F (37.1  C) Temp src: Oral BP: 118/85 Pulse: 60   Resp: 16 SpO2: 98 % O2 Device: None (Room air)    Weight: 179 lbs 0 oz  General Appearance: Patient in no acute distress   Respiratory: bilateral equal breath sounds with no added sounds   Cardiovascular: s1s2 with no murmur   GI: soft, non tender, bowel sounds noted; J tube in place for venting   Skin: no rash   Other: AAOX3 ,b/L UE and LE-5/5     Data   Recent Labs   Lab 03/21/21  0734 03/20/21  0555 03/19/21  1643   WBC 4.4 5.8 6.6   HGB 11.4* 12.9* 14.0   MCV 95 94 93   * 133* 147*    141 140   POTASSIUM 3.2* 3.6 3.2*   CHLORIDE 111* 111* 106   CO2 26 26 26   BUN 9 7 8   CR 0.66 0.71 0.68   ANIONGAP 6 5 8   SILAS 8.0* 8.1* 8.7   GLC 99 123* 97   ALBUMIN  --   --  3.5   PROTTOTAL  --   --  7.0   BILITOTAL  --   --  0.5   ALKPHOS  --   --  73   ALT  --   --  26   AST  --   --  12

## 2021-03-21 NOTE — PHARMACY-VANCOMYCIN DOSING SERVICE
Pharmacy Vancomycin Note  Date of Service 2021  Patient's  1970   50 year old, male    Indication: Sepsis  Goal Trough Level: 15-20 mg/L  Day of Therapy: 3  Current Vancomycin regimen:  1750 mg IV q12h    Current estimated CrCl = Estimated Creatinine Clearance: 157.2 mL/min (based on SCr of 0.66 mg/dL).    Creatinine for last 3 days  3/19/2021:  4:43 PM Creatinine 0.68 mg/dL  3/20/2021:  5:55 AM Creatinine 0.71 mg/dL  3/21/2021:  7:34 AM Creatinine 0.66 mg/dL    Recent Vancomycin Levels (past 3 days)  3/21/2021:  7:34 AM Vancomycin Level 10.2 mg/L, 10.5hr trough    Vancomycin IV Administrations (past 72 hours)                   vancomycin (VANCOCIN) 1,750 mg in sodium chloride 0.9 % 500 mL intermittent infusion (mg) 1,750 mg New Bag 21 2113     1,750 mg New Bag  0554     1,750 mg New Bag 21 1819                Nephrotoxins and other renal medications (From now, onward)    Start     Dose/Rate Route Frequency Ordered Stop    21 1705  vancomycin (VANCOCIN) 1,750 mg in sodium chloride 0.9 % 500 mL intermittent infusion      1,750 mg  over 3 Hours Intravenous EVERY 12 HOURS 21 1703               Contrast Orders - past 72 hours (72h ago, onward)    None          Interpretation of levels and current regimen:  Trough level is  Subtherapeutic    Has serum creatinine changed > 50% in last 72 hours: No    Urine output:  unable to determine    Renal Function: Stable    Plan:  1.  Increase Dose to 2000 mg IV q12h  2.  Pharmacy will check trough levels as appropriate in 1-3 Days.    3. Serum creatinine levels will be ordered daily for the first week of therapy and at least twice weekly for subsequent weeks.      Jaylen Ornelas [facility]        .

## 2021-03-21 NOTE — PLAN OF CARE
/78 (BP Location: Left arm)   Pulse 63   Temp 98.6  F (37  C) (Oral)   Resp 16   Wt 81.2 kg (179 lb)   SpO2 99%   BMI 24.97 kg/m      Reason for admission: fever    Vitals:   /78 (BP Location: Left arm)   Pulse 63   Temp 98.6  F (37  C) (Oral)   Resp 16   Wt 81.2 kg (179 lb)   SpO2 99%   BMI 24.97 kg/m      Activity: up independently    Pain: 6/10 left upper quadrant abdominal pain that radiates to left back. No aggravating factors. Pain is intermittent. Relieved with pain meds and carafate PRN      Neuro: Alert and oriented x4, PERRL, No focal deficits    Cardiac: S1S2, irregular rate. VSS. No edema noted    Respiratory: clear to auscultation bilaterally    GI/: Last BM Thursday. Denies feeling constipated. No nausea. Tells me he can effectively empty his bladder     Diet: Ate 100% of dinner although it took him several hours.     Lines: right lower arm peripheral IV infusing.     Wounds/Incisions: No wounds noted    Labs/imaging/Consults: morning labs pending    Discharge Plan: Patient lives at home with wife.

## 2021-03-22 ENCOUNTER — MYC MEDICAL ADVICE (OUTPATIENT)
Dept: INFECTIOUS DISEASES | Facility: CLINIC | Age: 51
End: 2021-03-22

## 2021-03-22 ENCOUNTER — PATIENT OUTREACH (OUTPATIENT)
Dept: CARE COORDINATION | Facility: CLINIC | Age: 51
End: 2021-03-22

## 2021-03-22 LAB
ALBUMIN SERPL-MCNC: 2.7 G/DL (ref 3.4–5)
ALP SERPL-CCNC: 50 U/L (ref 40–150)
ALT SERPL W P-5'-P-CCNC: 20 U/L (ref 0–70)
ANION GAP SERPL CALCULATED.3IONS-SCNC: 4 MMOL/L (ref 3–14)
AST SERPL W P-5'-P-CCNC: 12 U/L (ref 0–45)
BACTERIA SPEC CULT: ABNORMAL
BILIRUB SERPL-MCNC: 0.2 MG/DL (ref 0.2–1.3)
BUN SERPL-MCNC: 8 MG/DL (ref 7–30)
CALCIUM SERPL-MCNC: 8 MG/DL (ref 8.5–10.1)
CHLORIDE SERPL-SCNC: 111 MMOL/L (ref 94–109)
CO2 SERPL-SCNC: 26 MMOL/L (ref 20–32)
CREAT SERPL-MCNC: 0.68 MG/DL (ref 0.66–1.25)
CRP SERPL-MCNC: 38 MG/L (ref 0–8)
ERYTHROCYTE [DISTWIDTH] IN BLOOD BY AUTOMATED COUNT: 15.9 % (ref 10–15)
GFR SERPL CREATININE-BSD FRML MDRD: >90 ML/MIN/{1.73_M2}
GLUCOSE BLDC GLUCOMTR-MCNC: 102 MG/DL (ref 70–99)
GLUCOSE BLDC GLUCOMTR-MCNC: 92 MG/DL (ref 70–99)
GLUCOSE BLDC GLUCOMTR-MCNC: 92 MG/DL (ref 70–99)
GLUCOSE SERPL-MCNC: 82 MG/DL (ref 70–99)
HCT VFR BLD AUTO: 34.9 % (ref 40–53)
HGB BLD-MCNC: 11.2 G/DL (ref 13.3–17.7)
INR PPP: 1.1 (ref 0.86–1.14)
MAGNESIUM SERPL-MCNC: 2.1 MG/DL (ref 1.6–2.3)
MCH RBC QN AUTO: 29.9 PG (ref 26.5–33)
MCHC RBC AUTO-ENTMCNC: 32.1 G/DL (ref 31.5–36.5)
MCV RBC AUTO: 93 FL (ref 78–100)
PHOSPHATE SERPL-MCNC: 3.9 MG/DL (ref 2.5–4.5)
PLATELET # BLD AUTO: 120 10E9/L (ref 150–450)
POTASSIUM SERPL-SCNC: 3.5 MMOL/L (ref 3.4–5.3)
PREALB SERPL IA-MCNC: 11 MG/DL (ref 15–45)
PROT SERPL-MCNC: 5.8 G/DL (ref 6.8–8.8)
RBC # BLD AUTO: 3.75 10E12/L (ref 4.4–5.9)
SODIUM SERPL-SCNC: 142 MMOL/L (ref 133–144)
SPECIMEN SOURCE: ABNORMAL
VANCOMYCIN SERPL-MCNC: 10.6 MG/L
WBC # BLD AUTO: 3.9 10E9/L (ref 4–11)

## 2021-03-22 PROCEDURE — 83735 ASSAY OF MAGNESIUM: CPT | Performed by: INTERNAL MEDICINE

## 2021-03-22 PROCEDURE — 87040 BLOOD CULTURE FOR BACTERIA: CPT | Performed by: INTERNAL MEDICINE

## 2021-03-22 PROCEDURE — 258N000003 HC RX IP 258 OP 636: Performed by: INTERNAL MEDICINE

## 2021-03-22 PROCEDURE — 250N000013 HC RX MED GY IP 250 OP 250 PS 637: Performed by: PHYSICIAN ASSISTANT

## 2021-03-22 PROCEDURE — 80202 ASSAY OF VANCOMYCIN: CPT | Performed by: INTERNAL MEDICINE

## 2021-03-22 PROCEDURE — 999N000127 HC STATISTIC PERIPHERAL IV START W US GUIDANCE

## 2021-03-22 PROCEDURE — 250N000013 HC RX MED GY IP 250 OP 250 PS 637: Performed by: INTERNAL MEDICINE

## 2021-03-22 PROCEDURE — 36415 COLL VENOUS BLD VENIPUNCTURE: CPT | Performed by: INTERNAL MEDICINE

## 2021-03-22 PROCEDURE — 120N000002 HC R&B MED SURG/OB UMMC

## 2021-03-22 PROCEDURE — 85610 PROTHROMBIN TIME: CPT | Performed by: INTERNAL MEDICINE

## 2021-03-22 PROCEDURE — 99233 SBSQ HOSP IP/OBS HIGH 50: CPT | Performed by: INTERNAL MEDICINE

## 2021-03-22 PROCEDURE — 250N000013 HC RX MED GY IP 250 OP 250 PS 637: Performed by: NURSE PRACTITIONER

## 2021-03-22 PROCEDURE — 85027 COMPLETE CBC AUTOMATED: CPT | Performed by: INTERNAL MEDICINE

## 2021-03-22 PROCEDURE — 80053 COMPREHEN METABOLIC PANEL: CPT | Performed by: INTERNAL MEDICINE

## 2021-03-22 PROCEDURE — 250N000011 HC RX IP 250 OP 636: Performed by: PHYSICIAN ASSISTANT

## 2021-03-22 PROCEDURE — 84100 ASSAY OF PHOSPHORUS: CPT | Performed by: INTERNAL MEDICINE

## 2021-03-22 PROCEDURE — 86140 C-REACTIVE PROTEIN: CPT | Performed by: INTERNAL MEDICINE

## 2021-03-22 PROCEDURE — 250N000011 HC RX IP 250 OP 636: Performed by: INTERNAL MEDICINE

## 2021-03-22 PROCEDURE — 999N001017 HC STATISTIC GLUCOSE BY METER IP

## 2021-03-22 PROCEDURE — 84134 ASSAY OF PREALBUMIN: CPT | Performed by: INTERNAL MEDICINE

## 2021-03-22 PROCEDURE — 99232 SBSQ HOSP IP/OBS MODERATE 35: CPT | Mod: GC | Performed by: STUDENT IN AN ORGANIZED HEALTH CARE EDUCATION/TRAINING PROGRAM

## 2021-03-22 RX ADMIN — Medication 1 PATCH: at 19:59

## 2021-03-22 RX ADMIN — DIPHENHYDRAMINE HYDROCHLORIDE 25 MG: 25 SOLUTION ORAL at 11:24

## 2021-03-22 RX ADMIN — CARVEDILOL 6.25 MG: 6.25 TABLET, FILM COATED ORAL at 09:57

## 2021-03-22 RX ADMIN — DEXTROAMPHETAMINE SACCHARATE, AMPHETAMINE ASPARTATE, DEXTROAMPHETAMINE SULFATE AND AMPHETAMINE SULFATE 20 MG: 2.5; 2.5; 2.5; 2.5 TABLET ORAL at 09:56

## 2021-03-22 RX ADMIN — ONDANSETRON 4 MG: 4 TABLET, ORALLY DISINTEGRATING ORAL at 13:07

## 2021-03-22 RX ADMIN — OXYCODONE HYDROCHLORIDE 10 MG: 5 SOLUTION ORAL at 09:53

## 2021-03-22 RX ADMIN — OXYCODONE HYDROCHLORIDE 10 MG: 5 SOLUTION ORAL at 22:54

## 2021-03-22 RX ADMIN — OXYCODONE HYDROCHLORIDE 10 MG: 5 SOLUTION ORAL at 01:24

## 2021-03-22 RX ADMIN — VANCOMYCIN HYDROCHLORIDE 2000 MG: 10 INJECTION, POWDER, LYOPHILIZED, FOR SOLUTION INTRAVENOUS at 20:16

## 2021-03-22 RX ADMIN — POLYETHYLENE GLYCOL 3350 17 G: 17 POWDER, FOR SOLUTION ORAL at 09:58

## 2021-03-22 RX ADMIN — VANCOMYCIN HYDROCHLORIDE 2000 MG: 10 INJECTION, POWDER, LYOPHILIZED, FOR SOLUTION INTRAVENOUS at 21:07

## 2021-03-22 RX ADMIN — QUETIAPINE 25 MG: 25 TABLET ORAL at 22:52

## 2021-03-22 RX ADMIN — OXYCODONE HYDROCHLORIDE 10 MG: 5 SOLUTION ORAL at 06:11

## 2021-03-22 RX ADMIN — LORAZEPAM 1 MG: 2 LIQUID ORAL at 22:53

## 2021-03-22 RX ADMIN — OXYCODONE HYDROCHLORIDE 10 MG: 5 SOLUTION ORAL at 17:34

## 2021-03-22 RX ADMIN — SUCRALFATE 1 G: 1 SUSPENSION ORAL at 01:24

## 2021-03-22 RX ADMIN — OXYCODONE HYDROCHLORIDE 10 MG: 5 SOLUTION ORAL at 13:07

## 2021-03-22 RX ADMIN — ACETAMINOPHEN 500 MG: 325 SOLUTION ORAL at 06:53

## 2021-03-22 RX ADMIN — SODIUM CHLORIDE, SODIUM LACTATE, POTASSIUM CHLORIDE, CALCIUM CHLORIDE AND DEXTROSE MONOHYDRATE: 5; 600; 310; 30; 20 INJECTION, SOLUTION INTRAVENOUS at 10:00

## 2021-03-22 RX ADMIN — ONDANSETRON 4 MG: 4 TABLET, ORALLY DISINTEGRATING ORAL at 22:52

## 2021-03-22 RX ADMIN — CARVEDILOL 6.25 MG: 6.25 TABLET, FILM COATED ORAL at 17:34

## 2021-03-22 RX ADMIN — ONDANSETRON 4 MG: 2 INJECTION INTRAMUSCULAR; INTRAVENOUS at 06:05

## 2021-03-22 RX ADMIN — DIPHENHYDRAMINE HYDROCHLORIDE 25 MG: 25 SOLUTION ORAL at 19:50

## 2021-03-22 RX ADMIN — PANTOPRAZOLE SODIUM 40 MG: 40 TABLET, DELAYED RELEASE ORAL at 09:57

## 2021-03-22 ASSESSMENT — ACTIVITIES OF DAILY LIVING (ADL)
ADLS_ACUITY_SCORE: 11

## 2021-03-22 NOTE — PLAN OF CARE
"Shift: 2300 - 0700    Neuro: A&Ox4, able to make needs known  Cardiac: WNL, afebrile, PCDs on overnight, denies chest pain  Respiratory: lung sounds clear, equal bilaterally, no SOB reported  GI/: voids spontaneously, without difficulty.   Diet/appetite: pt previously refused TPN, did not eat this shift. zofran PRN given for nausea  Activity: up ad vitor, steady. Uses cane at home  Pain: pt reports 6-7/10 abdominal pain, radiates to back. Pain improved with PRN oxycodone and carafate for ulcers  Skin: no new deficits noted  Lines: PIV R forearm infusing D5 in LR. Pt requested fluids to be paused for ~2hrs overnight, stated he felt \"overloaded\". J tube LUQ, clean & dry.     Vitals: BP (!) 132/92 (BP Location: Left arm)   Pulse 62   Temp 97.8  F (36.6  C) (Oral)   Resp 16   Wt 81.2 kg (179 lb)   SpO2 96%   BMI 24.97 kg/m      Pt tolerated vancomycin IV dose overnight with prophylactic benadryl dose. No notable changes. Will continue current plan of care. Encourage oral intake next shift.   "

## 2021-03-22 NOTE — PROGRESS NOTES
Clinic Care Coordination Contact  Ambulatory Care Coordination to Inpatient Care Management   Hand-In Communication    Date:  March 22, 2021  Name: Parker Acevedo is enrolled in Ambulatory Care Coordination program and I am the Lead Care Coordinator.  CC Contact Information: Epic InBasket + phone  Payor Source: Payor: [facility] / Plan: [facility] MEDICARE ADVANTAGE / Product Type: Medicare /   Current services in place:     Please see the CC Snaphot and Care Management Flowsheets for specific  details of this Parker Acevedo care plan.   Additional details/specific concerns r/t this admission:    Goals of Care :   Goals       #2 Medical (pt-stated)      Goal Statement: I want to prevent hospital visits  Date Goal set: 9/9/2020   Barriers: on chronic TPN, IV line  Strengths: home infusion, care coordination  Date to Achieve By: 5/9/21  Patient expressed understanding of goal: yes  Action steps to achieve this goal:  1. I will complete IV antibiotics as prescribed. (completed)  2. I will follow up with infectious disease provider as scheduled 9/24/20. (completed)  3. I will stay at home during COVID-19 pandemic.   4. I will monitor my temperature daily as instructed and contact [facility] for elevated temperature parameters.  5. I will have G-tube replaced as scheduled. (completed)            I will follow this admission in Epic. Please feel free to contact me with questions or for further collaboration in discharge planning.      Dianelys MAYN, RN, PHN, CCM  Primary Clinic Care Coordination    [facility]  Primary Care Clinics  [email or web address]   Office: [phone]  Employed by [facility]

## 2021-03-22 NOTE — PROGRESS NOTES
Activity: up independently     Pain: 6/10 left upper quadrant abdominal pain that radiates to left back. No aggravating factors. Pain is intermittent. Relieved with pain meds and carafate PRN        Neuro: Alert and oriented x4, PERRL, No focal deficits     Cardiac: S1S2, regular rate. VSS. No edema noted     Respiratory: clear to auscultation bilaterally     GI/: Last BM Thursday. Denies feeling constipated. No nausea. Tells me he can effectively empty his bladder     Diet: Didn't eat dinner. Tells me he didn't have an appetite. Did ask for boxed lunch/snacks though.      Lines: right lower arm peripheral IV infusing.      Wounds/Incisions: No wounds noted     Labs/imaging/Consults: morning labs pending     Discharge Plan: Patient lives at home with wife.    *Patient was given Oxycodone solution in a syringe  by this writer. After he was handed the pain medicine, he asked for milk. When this writer turned back around, patient had an empty syringe in his hand and the syringe with Oxycodone was noted to be laying in the bed between patients legs. This writer pointed out the Oxycodone syringe then patient proceded to take the medicine. Next RN given update to monitor all pain meds given as patient was witnessed to be diverting.     to drink water to drink water

## 2021-03-22 NOTE — PROVIDER NOTIFICATION
Text paged [facility] Provider to clarify if Vanco level is needed today or tomorrow. IV Vanco now, but delayed d/t patient needing Benadryl prior. If Vanco level is needed today, requested STAT Vanco Level. Awaiting response and will update Lab.     STAT order for Vanco level entered, called Lab with update.

## 2021-03-22 NOTE — CONSULTS
Care Management Initial Consult    General Information  Assessment completed with: Patient  Type of CM/SW Visit: Initial Assessment  Primary Care Provider verified and updated as needed: Yes   Readmission within the last 30 days: previous discharge plan unsuccessful   Return Category: New Diagnosis    Reason for Consult: discharge planning  Advance Care Planning: Has ACP documents on file.      Communication Assessment  Patient's communication style: spoken language (English or Bilingual)    Hearing Difficulty or Deaf: no   Wear Glasses or Blind: no    Cognitive  Cognitive/Neuro/Behavioral: WDL                      Living Environment:   People in home: spouse, Rose  Current living Arrangements: house      Able to return to prior arrangements: yes     Family/Social Support:  Care provided by: self  Provides care for: no one  Marital Status:   Support system: Wife, Rose       Description of Support System: Supportive, Involved       Current Resources:   Patient receiving home care services: Yes  Skilled Home Care Services: Skilled Nursing  Community Resources: Home Infusion  Equipment currently used at home: cane, straight  Supplies currently used at home: None    Employment/Financial:  Financial Concerns: No concerns identified      Lifestyle & Psychosocial Needs:  Lifestyle     Physical activity     Days per week: 2 days     Minutes per session: 10 min     Stress: Only a little     Social Needs     Financial resource strain: Not hard at all     Food insecurity     Worry: Never true     Inability: Never true     Transportation needs     Medical: No     Non-medical: No     Socioeconomic History     Marital status:      Spouse name: Rose     Number of children: 1     Years of education: Not on file     Highest education level: GED or equivalent   Relationships     Social connections     Talks on phone: Once a week     Gets together: Never     Attends Congregation service: Never     Active member of  club or organization: No     Attends meetings of clubs or organizations: Never     Relationship status:      Intimate partner violence     Fear of current or ex partner: No     Emotionally abused: No     Physically abused: No     Forced sexual activity: No     Tobacco Use     Smoking status: Current Some Day Smoker     Types: Cigarettes     Smokeless tobacco: Never Used     Tobacco comment: 2/4/2021    smokes 3 cigarettes/day   Substance and Sexual Activity     Alcohol use: No     Frequency: Never     Drinks per session: Patient refused     Binge frequency: Never     Comment: quit 2002     Drug use: No     Sexual activity: Yes     Partners: Female     Birth control/protection: None     Comment: no protection     Functional Status:  Prior to admission patient needed assistance: Independent.      Mental Health Status: Not discussed at this time.     Chemical Dependency Status: Not discussed at this time.     Values/Beliefs:  Spiritual, Cultural Beliefs, Moravian Practices, Values that affect care: no             Additional Information:  Care management assessment completed via telephone. Patient is well known to care management services, declines any changes to his in home services. Patient is open to [facility] for chronic TPN, they are aware of his admission. Patient is currently on IV vanco, unclear if this will be needed at discharge. Patient has skilled nursing visits through [facility]. Patient notes that when is is ready to discharge, his wife will transport. Per chart review, patient's port has been removed, he will need line placement prior to discharge for continued TPN. Primary provider paged at this time for discharge planning update. RNCC will continue to follow for discharge planning.     [facility] (chronic TPN)  Phone  [phone]  Fax  [phone]    [facility] (RN)  Phone: [phone]  Fax: [phone] 1523 Addendum:  Per update from the primary  provider, patient may be potentially medically ready for discharge tomorrow vs Wednesday pending cultures, clearance for PICC line placement and discharge planning. [facility] updated on discharge planning, benefit check requested for IV vanco.      1530 Addendum:   Patient's wife, Rose requesting update, provided discharge planning update. Rose reiterated that when a line is placed, patient should have a silicone line placed d/t needing alcohol locks. [facility] liaison updated, will need to be confirmed w/IR for line placement.     1615 Addendum:  Per [facility], patient has insurance coverage for IV abx at home as long as it is on a CADD pump.     Miri Cleary, RNCC, BSN    [facility]    Medicine Group  [address] [place] [id number]    [email or web address]    Office: [phone] Pager: [phone]  To contact the weekend RNCC, page [phone].

## 2021-03-22 NOTE — PROGRESS NOTES
This is a recent snapshot of the patient's [facility] medical record.  For current drug dose and complete information and questions, call [phone] or In Basket pool, [facility] ([id number])  CSN Number:  [id number]

## 2021-03-22 NOTE — PROGRESS NOTES
[facility] SIGN OFF NOTE     Patient:  Parker Acevedo   YOB: 1970, MRN: [id number]  Date of Visit: 03/22/2021  Date of Admission: 3/19/2021  Consult Requester: Kristie Tom MD          Assessment and Recommendations:   ID Problem List:  1. Sepsis, with concern for intravascular non-hemodialysis catheter-related infection from (Cm catheter):  - s/p removal on 03/19  - preliminary culture data thus far with:  + >100 colonies of staph epidermidis (from 03/19 catheter tip culture)   + staph epidermidis (03/19 peripheral culture)  +strep bovis (03/19 from catheter tip culture)  - Hx of previous frequent polymicrobial line infections    2. Prior Celestino-en-Y gastric bypass, esophagojejunostomy:  - Complicated by short gut syndrome and chronic malnutrition requiring TPN.    Recommendations:  1. Plan to complete total seven day course of vancomycin for line related bacteremia (from date of line removal), with stop date of 03/26.    2. Assuming blood cultures from 03/20 onwards remain negative by the end of tomorrow, would be okay from ID standpoint to proceed with Cm catheter replacement.     3. ID will continue to f/u BC, with additional recommendations to follow.    4. Given new result of strep bovis, would recommend patient obtain out patient colonoscopy.     Discussion:  Parker Acevedo is a 50 year old male with a PMH of Celestino-en-Y gastric bypass and esophagojejunostomy complicated by short gut syndrome and chronic malnutrition requiring TPN, as well as several incidents of polymicrobial central line infection resulting in line replacements; admitted on 03/19 with suspected recurrent episode. Patient inadvertently cut his cm catheter at home and shortly thereafter developed multiple systemic complaints of fevers/chills/mylagias/weakness/headache. He underwent IR guided line removal on 03/19, at which point he was noted to be febrile and sent to ED.  Certainly, this presentation once again does raise concern for potential recurrent line infection, especially in setting of him accidentally tampering with it at home. BC (1 from catheter tip & 1 from peripheral) thus far are positive for staph epidermidis and 1 BC from 03/19 (catheter tip) is positive for strep bovis. Patient has remained afebrile since then and has had clinical improvement while on vancomycin. In review of previous ID notes, it appears there have been logistical issues with arranging home infusions for patient with alternative antibiotics (other than vancomycin) and patient does have an anaphylactic allergy documented to PCN. As such, and in the setting of current staph epidermidis (and now strep bovis) bacteremia, we would recommend patient discharging home with plan to complete a seven week course of vancomycin (this should be more than adequate for bacteremia with these pathogens in setting of line removal that occurred on 03/19). Assuming BC from 03/20 onwards remain negative as of tomorrow, it would be reasonable for patient to have line replaced at that time. Somewhat odd that strep bovis has been isolated from catheter tip only, will await any additional BC growth and would recommend patient proceed with out patient colonoscopy given this new finding. Unclear clinical significance as it was not isolated from blood cultures. It is reassuring that line has been removed and that subsequent BC have remained negative thus far.      Thank you for the consult. ID will will continue to follow.     Elvira Greer DO, MPH  Infectious Disease Fellow, PGY-4   Pager: [phone]   03/22/2021         Interval History and Events:   Chart reviewed; no significant overnight events noted. Has remained afebrile approx. last 36 hours or so and otherwise HD stable. 2 BC from 03/19 (one from catheter tip and one from periphery) are positive for staph epidermidis, one anaerobic culture from 03/19 is now positive for  strep bovis as well. Subsequent BC from  on remain with NGTD.          Antimicrobial Treatment:   - Clindamycin ()  - Cefepime ( - )  - Vancomycin ( - )         Review of Systems:   Targeted 4 point ROS was completed with pertinent positives and negatives are detailed above.         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 Patient reports that he was at home cutting out pictures for a banner/poster for a family member's  a few days ago when he inadvertently cut his azevedo catheter. He notes that his wife capped it and dressed it immediately. However, shortly thereafter, patient began feeling generally unwell. He describes developing onset of subjective fevers/chills, as well as myalgias, generalized weakness, and headache. Patient was able to schedule an appointment yesterday,  for catheter replacement. However, while there, he was noted to be febrile and was subsequently sent to ED for evaluation. Line was removed at that time but not replaced. Prior to that, his azevedo catheter had been replaced on  following an admission  -  for polymicrobial line infection. In review of EMR, patient has had multiple admissions with similar presentation and does have history of multiple previous line infections with a variety of organisms including: Streptococcus salivarius, Streptococcus mitis, Staphylococcus hominis, Stahylococcus capitis, Staphylococcus epidermidis, Enterococcus faecalis, Aerococcus viridans, Staphylococcus lugdunensis, Psychrobacter faecalis, Corynebacterium,  Finegoldia magna, Granulicatella adiacens, Streptococcus  salivarius, Rothia mucilaginosa, Candida albicans. Patient notes that his current symptoms feel similar in nature to previous line infections. He otherwise denies any respiratory complaints, GI symptoms, urinary symptoms, or other issues at this time. Patient does have two dogs in the home, but reports that neither have been near his catheter site. No additional concerns or complaints at this time.     On arrival, patient was noted to be febrile up to 102.6, otherwise has remained HD stable. No leukocytosis and CMP, procalcitonin, and lactic acid have all been grossly normal.  CXR was obtained on admission was largely unremarkable; COVID-19 PCR negative. Patient received an intra-procedure dose of clindamycin and currently remains on cefepime/vancomycin.          Physical Examination:   Temp:  [97.8  F (36.6  C)-98.8  F (37.1  C)] 98.6  F (37  C)  Pulse:  [56-66] 56  Resp:  [16-18] 18  BP: (104-132)/(73-92) 122/78  SpO2:  [95 %-100 %] 96 %    No intake/output data recorded.    Vitals:    03/19/21 1444   Weight: 81.2 kg (179 lb)       Constitutional: Adult male seen resting comfortably in bed. Overall non-toxic in appearance. Pleasant and interactive.   HEENT: NC/AT,sclera clear, conjunctiva normal.  Respiratory: No increased work of breathing, CTAB, no crackles or wheezing.  Cardiovascular: RRR, no murmur noted. No peripheral edema.  GI: Normal bowel sounds, soft, non-distended and non-tender.  Skin: Warm, dry, well-perfused. No active drainage at previous azevedo catheter. Otherwise, no bruising, bleeding, rashes, or lesions on limited exam.  Musculoskeletal: Extremities grossly normal, non-tender, no edema. Good strength and ROM in bed.   Neurologic: A&O. Answers questions appropriately, speech normal. Moves all extremities spontaneously.  Neuropsychiatric: Calm. Affect appropriate to situation.  Vascular access: PIV in place are CDI, non-tender, no surrounding erythema.         Medications:       amphetamine-dextroamphetamine  20 mg Oral Daily    carvedilol  6.25 mg Oral BID w/meals    fentaNYL  25 mcg Transdermal Q72H    fentaNYL   Transdermal Q8H    Lidocaine  1 patch Transdermal Q24h    lidocaine   Transdermal Q8H    nicotine  1 patch Transdermal Q24H    nicotine   Transdermal Q8H    pantoprazole  40 mg Oral Daily    polyethylene glycol  17 g Oral BID    QUEtiapine  25 mg Oral At Bedtime    vancomycin (VANCOCIN) IV  2,000 mg Intravenous Q12H    vitamin D2  50,000 Units Oral Weekly       Antiinfectives:  Anti-infectives (From now, onward)      Start     Dose/Rate Route Frequency Ordered Stop    03/21/21 0930  vancomycin (VANCOCIN) 2,000 mg in sodium chloride 0.9 % 500 mL intermittent infusion      2,000 mg  over 3 Hours Intravenous EVERY 12 HOURS 03/21/21 0858              Infusions/Drips:   dextrose 5% lactated ringers 100 mL/hr at 03/22/21 1000            Laboratory Data:   No results found for: ACD4    Microbiology:  Culture Micro   Date Value Ref Range Status   03/22/2021 No growth after 3 hours  Preliminary   03/22/2021 No growth after 3 hours  Preliminary   03/21/2021 No growth after 1 day  Preliminary   03/21/2021 No growth after 1 day  Preliminary   03/20/2021 No growth after 2 days  Preliminary   03/20/2021 No growth after 2 days  Preliminary   03/19/2021 (A)  Preliminary    Cultured on the 2nd day of incubation:  Staphylococcus epidermidis     03/19/2021   Preliminary    Critical Value/Significant Value, preliminary result only, called to and read back by  Jerad Miranda RN at 13:05pm 3/21/21      03/19/2021 Susceptibility testing in progress  Preliminary   03/19/2021   Preliminary    (Note)  POSITIVE for STAPHYLOCOCCUS EPIDERMIDIS and NEGATIVE for the mecA  gene (not resistant to methicillin) by [facility] [employer] nucleic acid test.  The mecA gene was not detected. Final identification and  antimicrobial susceptibility testing will be verified by standard  methods.    Specimen tested with [facility]  multiplex, gram-positive blood culture  nucleic acid test for the following targets: Staph aureus, Staph  epidermidis, Staph lugdunensis, other Staph species, Enterococcus  faecalis, Enterococcus faecium, Streptococcus species, S. agalactiae,  S. anginosus grp., S. pneumoniae, S. pyogenes, Listeria sp., mecA  (methicillin resistance) and Melvin/B (vancomycin resistance).    Critical Value/Significant Value called to and read back by Jerad Miranda RN at 1552 on 3.21.21 CW         Inflammatory Markers    Recent Labs   Lab Test 03/22/21  0541 03/21/21  0734 01/29/21  2312 07/28/20  1607 07/28/20  1607 05/14/19  1145 05/14/19  1145 01/23/18  0845 01/23/18  0845   SED  --   --   --   --  10  --  13  --  10   CRP 38.0* 45.0* <2.9   < > <2.9   < >  --    < > <2.9    < > = values in this interval not displayed.       Metabolic Studies     Recent Labs   Lab Test 03/22/21  0541 03/21/21  0734 03/20/21  0555 03/19/21  1643 06/01/18  1807 06/01/18  1807 07/23/13  1036 07/23/13  1036    142 141 140   < > 145*   < > 143   POTASSIUM 3.5 3.2* 3.6 3.2*   < > 3.5   < > 4.0   CHLORIDE 111* 111* 111* 106   < > 109   < > 105   CO2 26 26 26 26   < > 27   < > 29   ANIONGAP 4 6 5 8   < > 10   < > 9   BUN 8 9 7 8   < > 5*   < > 10   CR 0.68 0.66 0.71 0.68   < > 0.59*   < > 0.72   GFRESTIMATED >90 >90 >90 >90   < > >90   < > >90   GLC 82 99 123* 97   < > 88   < > 88   A1C  --   --   --   --   --   --   --  4.9   SILAS 8.0* 8.0* 8.1* 8.7   < > 8.0*   < > 8.8   PHOS 3.9  --   --   --    < > 2.9   < >  --    MAG 2.1  --   --   --    < > 2.0   < >  --    LACT  --   --   --  0.9   < > 0.9   < >  --    CKT  --   --   --   --   --  52   < >  --     < > = values in this interval not displayed.       Hepatic Studies    Recent Labs   Lab Test 03/22/21  0541 03/19/21  1643 03/02/21  1225   BILITOTAL 0.2 0.5 0.3   ALKPHOS 50 73 73   ALBUMIN 2.7* 3.5 3.5   AST 12 12 7   ALT 20 26 27       Pancreatitis testing    Recent Labs   Lab Test 03/02/21  1225  09/29/19  1559 09/29/19  1559 05/23/19  1606 05/23/19  1606   LIPASE  --   --  58*  --  117   TRIG 141   < >  --    < >  --     < > = values in this interval not displayed.       Hematology Studies      Recent Labs   Lab Test 03/22/21  0541 03/21/21  0734 03/20/21  0555   WBC 3.9* 4.4 5.8   ANEU  --   --  4.6   ALYM  --   --  0.5*   DACIA  --   --  0.7   AEOS  --   --  0.0   HGB 11.2* 11.4* 12.9*   HCT 34.9* 36.1* 40.3   * 117* 133*       Arterial Blood Gas Testing    Recent Labs   Lab Test 09/19/17  1929   O2PER 21        Urine Studies     Recent Labs   Lab Test 03/19/21  1644 07/28/20  1905 11/03/19  0025   URINEPH 7.5* 6.5 6.0   NITRITE Negative Negative Negative   LEUKEST Negative Negative Negative   WBCU 1 <1 <1       Vancomycin Levels     Recent Labs   Lab Test 03/21/21  0734 02/04/21  1415 02/02/21  1306   VANCOMYCIN 10.2 19.2 15.9       Last check of C difficile  C Diff Toxin B PCR   Date Value Ref Range Status   07/15/2012   Final    Negative: Clostridium difficile target DNA sequences NOT detected, presumed   negative for Clostridium difficile toxin B or the number of bacteria present   may be below the limit of detection for the test.   FDA approved assay performed using [employer] GeneXpert real-time PCR.   A negative result does not exclude actual disease due to Clostridium difficile   and may be due to improper collection, handling and storage of the specimen or   the number of organisms in the specimen is below the detection limit of the   assay.            Imaging:   CXR (03/19):   Impression: No acute airspace disease.

## 2021-03-22 NOTE — PROGRESS NOTES
Patient's wife, Rose [phone] called for update. Care Coordinator offered to call her with update.

## 2021-03-23 ENCOUNTER — APPOINTMENT (OUTPATIENT)
Dept: INTERVENTIONAL RADIOLOGY/VASCULAR | Facility: CLINIC | Age: 51
DRG: 314 | End: 2021-03-23
Attending: NURSE PRACTITIONER
Payer: COMMERCIAL

## 2021-03-23 ENCOUNTER — HOME INFUSION (PRE-WILLOW HOME INFUSION) (OUTPATIENT)
Dept: PHARMACY | Facility: CLINIC | Age: 51
End: 2021-03-23

## 2021-03-23 ENCOUNTER — PATIENT OUTREACH (OUTPATIENT)
Dept: CARE COORDINATION | Facility: CLINIC | Age: 51
End: 2021-03-23

## 2021-03-23 VITALS
DIASTOLIC BLOOD PRESSURE: 96 MMHG | SYSTOLIC BLOOD PRESSURE: 131 MMHG | WEIGHT: 192 LBS | OXYGEN SATURATION: 96 % | BODY MASS INDEX: 26.78 KG/M2 | RESPIRATION RATE: 14 BRPM | HEART RATE: 56 BPM | TEMPERATURE: 97.9 F

## 2021-03-23 LAB
ANION GAP SERPL CALCULATED.3IONS-SCNC: 3 MMOL/L (ref 3–14)
BUN SERPL-MCNC: 10 MG/DL (ref 7–30)
CALCIUM SERPL-MCNC: 8.5 MG/DL (ref 8.5–10.1)
CHLORIDE SERPL-SCNC: 110 MMOL/L (ref 94–109)
CO2 SERPL-SCNC: 30 MMOL/L (ref 20–32)
CREAT SERPL-MCNC: 0.7 MG/DL (ref 0.66–1.25)
ERYTHROCYTE [DISTWIDTH] IN BLOOD BY AUTOMATED COUNT: 15.6 % (ref 10–15)
GFR SERPL CREATININE-BSD FRML MDRD: >90 ML/MIN/{1.73_M2}
GLUCOSE BLDC GLUCOMTR-MCNC: 100 MG/DL (ref 70–99)
GLUCOSE BLDC GLUCOMTR-MCNC: 107 MG/DL (ref 70–99)
GLUCOSE BLDC GLUCOMTR-MCNC: 83 MG/DL (ref 70–99)
GLUCOSE SERPL-MCNC: 94 MG/DL (ref 70–99)
HCT VFR BLD AUTO: 35.6 % (ref 40–53)
HGB BLD-MCNC: 11.4 G/DL (ref 13.3–17.7)
MCH RBC QN AUTO: 30 PG (ref 26.5–33)
MCHC RBC AUTO-ENTMCNC: 32 G/DL (ref 31.5–36.5)
MCV RBC AUTO: 94 FL (ref 78–100)
PLATELET # BLD AUTO: 138 10E9/L (ref 150–450)
POTASSIUM SERPL-SCNC: 3.4 MMOL/L (ref 3.4–5.3)
RBC # BLD AUTO: 3.8 10E12/L (ref 4.4–5.9)
SODIUM SERPL-SCNC: 143 MMOL/L (ref 133–144)
VANCOMYCIN SERPL-MCNC: 13.3 MG/L
WBC # BLD AUTO: 4.8 10E9/L (ref 4–11)

## 2021-03-23 PROCEDURE — 258N000003 HC RX IP 258 OP 636: Performed by: INTERNAL MEDICINE

## 2021-03-23 PROCEDURE — 99232 SBSQ HOSP IP/OBS MODERATE 35: CPT | Mod: GC | Performed by: STUDENT IN AN ORGANIZED HEALTH CARE EDUCATION/TRAINING PROGRAM

## 2021-03-23 PROCEDURE — 250N000011 HC RX IP 250 OP 636: Performed by: INTERNAL MEDICINE

## 2021-03-23 PROCEDURE — 250N000013 HC RX MED GY IP 250 OP 250 PS 637: Performed by: INTERNAL MEDICINE

## 2021-03-23 PROCEDURE — 999N001017 HC STATISTIC GLUCOSE BY METER IP

## 2021-03-23 PROCEDURE — 250N000013 HC RX MED GY IP 250 OP 250 PS 637: Performed by: PHYSICIAN ASSISTANT

## 2021-03-23 PROCEDURE — 76937 US GUIDE VASCULAR ACCESS: CPT

## 2021-03-23 PROCEDURE — 36558 INSERT TUNNELED CV CATH: CPT | Mod: 78 | Performed by: PHYSICIAN ASSISTANT

## 2021-03-23 PROCEDURE — 3E0436Z INTRODUCTION OF NUTRITIONAL SUBSTANCE INTO CENTRAL VEIN, PERCUTANEOUS APPROACH: ICD-10-PCS | Performed by: INTERNAL MEDICINE

## 2021-03-23 PROCEDURE — 36415 COLL VENOUS BLD VENIPUNCTURE: CPT | Performed by: INTERNAL MEDICINE

## 2021-03-23 PROCEDURE — C1769 GUIDE WIRE: HCPCS

## 2021-03-23 PROCEDURE — 76937 US GUIDE VASCULAR ACCESS: CPT | Mod: 26 | Performed by: PHYSICIAN ASSISTANT

## 2021-03-23 PROCEDURE — 80202 ASSAY OF VANCOMYCIN: CPT | Performed by: INTERNAL MEDICINE

## 2021-03-23 PROCEDURE — 80048 BASIC METABOLIC PNL TOTAL CA: CPT | Performed by: INTERNAL MEDICINE

## 2021-03-23 PROCEDURE — 250N000009 HC RX 250: Performed by: PHYSICIAN ASSISTANT

## 2021-03-23 PROCEDURE — 999N000127 HC STATISTIC PERIPHERAL IV START W US GUIDANCE

## 2021-03-23 PROCEDURE — C1751 CATH, INF, PER/CENT/MIDLINE: HCPCS

## 2021-03-23 PROCEDURE — 99153 MOD SED SAME PHYS/QHP EA: CPT

## 2021-03-23 PROCEDURE — 0JH60XZ INSERTION OF TUNNELED VASCULAR ACCESS DEVICE INTO CHEST SUBCUTANEOUS TISSUE AND FASCIA, OPEN APPROACH: ICD-10-PCS | Performed by: PHYSICIAN ASSISTANT

## 2021-03-23 PROCEDURE — 250N000011 HC RX IP 250 OP 636: Performed by: PHYSICIAN ASSISTANT

## 2021-03-23 PROCEDURE — 77001 FLUOROGUIDE FOR VEIN DEVICE: CPT | Mod: 26 | Performed by: PHYSICIAN ASSISTANT

## 2021-03-23 PROCEDURE — 77001 FLUOROGUIDE FOR VEIN DEVICE: CPT

## 2021-03-23 PROCEDURE — 99152 MOD SED SAME PHYS/QHP 5/>YRS: CPT

## 2021-03-23 PROCEDURE — 99239 HOSP IP/OBS DSCHRG MGMT >30: CPT | Performed by: INTERNAL MEDICINE

## 2021-03-23 PROCEDURE — 272N000504 HC NEEDLE CR4

## 2021-03-23 PROCEDURE — 85027 COMPLETE CBC AUTOMATED: CPT | Performed by: INTERNAL MEDICINE

## 2021-03-23 PROCEDURE — 02HV33Z INSERTION OF INFUSION DEVICE INTO SUPERIOR VENA CAVA, PERCUTANEOUS APPROACH: ICD-10-PCS | Performed by: PHYSICIAN ASSISTANT

## 2021-03-23 RX ORDER — HEPARIN SODIUM (PORCINE) LOCK FLUSH IV SOLN 100 UNIT/ML 100 UNIT/ML
3 SOLUTION INTRAVENOUS
Status: DISCONTINUED | OUTPATIENT
Start: 2021-03-23 | End: 2021-03-23 | Stop reason: HOSPADM

## 2021-03-23 RX ORDER — HEPARIN SODIUM,PORCINE 10 UNIT/ML
5 VIAL (ML) INTRAVENOUS EVERY 24 HOURS
Status: CANCELLED | OUTPATIENT
Start: 2021-03-23

## 2021-03-23 RX ORDER — NALOXONE HYDROCHLORIDE 0.4 MG/ML
0.4 INJECTION, SOLUTION INTRAMUSCULAR; INTRAVENOUS; SUBCUTANEOUS
Status: DISCONTINUED | OUTPATIENT
Start: 2021-03-23 | End: 2021-03-23 | Stop reason: HOSPADM

## 2021-03-23 RX ORDER — QUETIAPINE FUMARATE 25 MG/1
25 TABLET, FILM COATED ORAL AT BEDTIME
Qty: 30 TABLET | Refills: 0 | Status: ON HOLD | OUTPATIENT
Start: 2021-03-23 | End: 2021-05-07

## 2021-03-23 RX ORDER — NALOXONE HYDROCHLORIDE 0.4 MG/ML
0.2 INJECTION, SOLUTION INTRAMUSCULAR; INTRAVENOUS; SUBCUTANEOUS
Status: DISCONTINUED | OUTPATIENT
Start: 2021-03-23 | End: 2021-03-23 | Stop reason: HOSPADM

## 2021-03-23 RX ORDER — FENTANYL CITRATE 50 UG/ML
25-50 INJECTION, SOLUTION INTRAMUSCULAR; INTRAVENOUS EVERY 5 MIN PRN
Status: DISCONTINUED | OUTPATIENT
Start: 2021-03-23 | End: 2021-03-23 | Stop reason: HOSPADM

## 2021-03-23 RX ORDER — HEPARIN SODIUM,PORCINE 10 UNIT/ML
5-10 VIAL (ML) INTRAVENOUS
Status: DISCONTINUED | OUTPATIENT
Start: 2021-03-23 | End: 2021-03-23 | Stop reason: HOSPADM

## 2021-03-23 RX ORDER — FLUMAZENIL 0.1 MG/ML
0.2 INJECTION, SOLUTION INTRAVENOUS
Status: DISCONTINUED | OUTPATIENT
Start: 2021-03-23 | End: 2021-03-23 | Stop reason: HOSPADM

## 2021-03-23 RX ORDER — HEPARIN SODIUM (PORCINE) LOCK FLUSH IV SOLN 100 UNIT/ML 100 UNIT/ML
3 SOLUTION INTRAVENOUS EVERY 24 HOURS
Status: DISCONTINUED | OUTPATIENT
Start: 2021-03-23 | End: 2021-03-23 | Stop reason: HOSPADM

## 2021-03-23 RX ORDER — DIPHENHYDRAMINE HCL 12.5MG/5ML
25 LIQUID (ML) ORAL EVERY 12 HOURS PRN
Qty: 80 ML | Refills: 0 | Status: SHIPPED | OUTPATIENT
Start: 2021-03-23 | End: 2021-03-27

## 2021-03-23 RX ADMIN — ONDANSETRON 4 MG: 4 TABLET, ORALLY DISINTEGRATING ORAL at 10:53

## 2021-03-23 RX ADMIN — CARVEDILOL 6.25 MG: 6.25 TABLET, FILM COATED ORAL at 18:05

## 2021-03-23 RX ADMIN — FENTANYL CITRATE 50 MCG: 50 INJECTION, SOLUTION INTRAMUSCULAR; INTRAVENOUS at 11:35

## 2021-03-23 RX ADMIN — PANTOPRAZOLE SODIUM 40 MG: 40 TABLET, DELAYED RELEASE ORAL at 08:23

## 2021-03-23 RX ADMIN — FENTANYL CITRATE 50 MCG: 50 INJECTION, SOLUTION INTRAMUSCULAR; INTRAVENOUS at 11:29

## 2021-03-23 RX ADMIN — Medication 4 ML: at 12:05

## 2021-03-23 RX ADMIN — VANCOMYCIN HYDROCHLORIDE 2000 MG: 10 INJECTION, POWDER, LYOPHILIZED, FOR SOLUTION INTRAVENOUS at 11:12

## 2021-03-23 RX ADMIN — SUCRALFATE 1 G: 1 SUSPENSION ORAL at 04:07

## 2021-03-23 RX ADMIN — Medication 1 PATCH: at 18:06

## 2021-03-23 RX ADMIN — OXYCODONE HYDROCHLORIDE 10 MG: 5 SOLUTION ORAL at 18:05

## 2021-03-23 RX ADMIN — LIDOCAINE HYDROCHLORIDE 10 ML: 10 INJECTION, SOLUTION EPIDURAL; INFILTRATION; INTRACAUDAL; PERINEURAL at 11:55

## 2021-03-23 RX ADMIN — MIDAZOLAM 1 MG: 1 INJECTION INTRAMUSCULAR; INTRAVENOUS at 11:29

## 2021-03-23 RX ADMIN — DEXTROAMPHETAMINE SACCHARATE, AMPHETAMINE ASPARTATE, DEXTROAMPHETAMINE SULFATE AND AMPHETAMINE SULFATE 20 MG: 2.5; 2.5; 2.5; 2.5 TABLET ORAL at 08:23

## 2021-03-23 RX ADMIN — SODIUM CHLORIDE, SODIUM LACTATE, POTASSIUM CHLORIDE, CALCIUM CHLORIDE AND DEXTROSE MONOHYDRATE: 5; 600; 310; 30; 20 INJECTION, SOLUTION INTRAVENOUS at 02:25

## 2021-03-23 RX ADMIN — OXYCODONE HYDROCHLORIDE 10 MG: 5 SOLUTION ORAL at 04:08

## 2021-03-23 RX ADMIN — MIDAZOLAM 1 MG: 1 INJECTION INTRAMUSCULAR; INTRAVENOUS at 11:45

## 2021-03-23 RX ADMIN — FENTANYL CITRATE 50 MCG: 50 INJECTION, SOLUTION INTRAMUSCULAR; INTRAVENOUS at 11:52

## 2021-03-23 RX ADMIN — OXYCODONE HYDROCHLORIDE 10 MG: 5 SOLUTION ORAL at 08:28

## 2021-03-23 RX ADMIN — CARVEDILOL 6.25 MG: 6.25 TABLET, FILM COATED ORAL at 08:23

## 2021-03-23 RX ADMIN — MIDAZOLAM 1 MG: 1 INJECTION INTRAMUSCULAR; INTRAVENOUS at 11:35

## 2021-03-23 RX ADMIN — DIPHENHYDRAMINE HYDROCHLORIDE 25 MG: 25 SOLUTION ORAL at 10:27

## 2021-03-23 RX ADMIN — FENTANYL CITRATE 50 MCG: 50 INJECTION, SOLUTION INTRAMUSCULAR; INTRAVENOUS at 11:44

## 2021-03-23 RX ADMIN — MIDAZOLAM 1 MG: 1 INJECTION INTRAMUSCULAR; INTRAVENOUS at 11:52

## 2021-03-23 RX ADMIN — OXYCODONE HYDROCHLORIDE 10 MG: 5 SOLUTION ORAL at 13:58

## 2021-03-23 RX ADMIN — Medication 4 ML: at 12:06

## 2021-03-23 ASSESSMENT — ACTIVITIES OF DAILY LIVING (ADL)
ADLS_ACUITY_SCORE: 11

## 2021-03-23 NOTE — IR NOTE
Patient Name: Parker Acevedo  Medical Record Number: [id number]  Today's Date: 3/23/2021    Procedure: Tunneled central venous catheter  Proceduralist: Per Dumont PA-C, Karla Jeter PA-C    Procedure Start: 1127  Procedure end: 1205  Sedation medications administered: 200 mcg fentanyl and 4 mg versed    Report given to: Observation RN  : YOAV    Other Notes: Pt arrived to IR room 2 from Observation. Consent reviewed. Pt denies any questions or concerns regarding procedure. Pt positioned supine and monitored per protocol. Pt tolerated procedure without any noted complications. Pt transferred back to Observation.

## 2021-03-23 NOTE — PROGRESS NOTES
Care Management Follow Up    Length of Stay (days): 4    Expected Discharge Date:  3/23/2021  Concerns to be Addressed:  All concerns addressed.      Patient plan of care discussed at interdisciplinary rounds: Yes    Anticipated Discharge Disposition:  Home  Anticipated Discharge Services: Home infusion  Anticipated Discharge DME:  None.     Education Provided on the Discharge Plan: Yes   Patient/Family in Agreement with the Plan:  Yes    Referrals Placed by CM/SW:  Home Infusion.     Additional Information:  Patient is medically ready for discharge. Tunneled CVC line placed. Patient will discharge to home w/home infusion services, will resume home TPN and continue IV vanco as ordered. [facility] updated on discharge plan. Patient aware of discharge plan, IMM completed via telephone, patient declined copy, agreeable to discharge plan. Spouse will provide discharge transportation. Bedside nurse updated.     [facility] (chronic TPN)  Phone  [phone]  Fax  [phone]     [facility] (RN)  Phone: [phone]  Fax: [phone] Addendum:  Voicemail received from unit charge nurse inquiring if [facility] has been updated on discharge plan. [facility] typically updates home care agency w/[facility] is involved, discharge orders and H&P sent at this time.     Miri Cleary, RNCC, BSN    [facility]    Medicine Group  [address] [place] [id number]    [email or web address]    Office: [phone] Pager: [phone]  To contact the weekend RNCC, page [phone].

## 2021-03-23 NOTE — PROGRESS NOTES
Patient up to bathroom, gown on. PIV to Left lower forearm occluded. Order placed for STAT Vascular Access /c US. Vascular arrived at bedside, new PIV placed to Right lower forearm. IR Transport at bedside, they did not want to reconnect IVF or start Vanco yet, will be done in IR. Zofran ODT given prior to pt leaving unit d/t slight nausea. Patient off unit via litter to IR for PICC Placement. IV Pole with IVF and new bag of Vanco went with patient.

## 2021-03-23 NOTE — PROGRESS NOTES
Discharge instruction ready @ 1430, pt informed RN that wife wanted to be present. Wife has not yet arrived. Reviewed instructions with patient at 1715.  Patient verbalized understanding. PIV removed, Wife arrive and at bedside, updated her on message from [facility]. Patient given 1800 medications and pain medication prior to discharge. Patient able to ambulate to the ER lobby. Patient discharged with medications from discharge pharmacy and all belongings.

## 2021-03-23 NOTE — PROCEDURES
[facility]    Procedure: IR Procedure Note    Date/Time: 3/23/2021 12:14 PM  Performed by: Per Dumont PA-C  Authorized by: Per Dumont PA-C   IR Fellow Physician:  Other(s) attending procedure: Karla Jeter PA-C    UNIVERSAL PROTOCOL   Site Marked: NA  Prior Images Obtained and Reviewed:  Yes  Required items: Required blood products, implants, devices and special equipment available    Patient identity confirmed:  Verbally with patient, arm band, provided demographic data and hospital-assigned identification number  Patient was reevaluated immediately before administering moderate or deep sedation or anesthesia  Confirmation Checklist:  Patient's identity using two indicators, relevant allergies, procedure was appropriate and matched the consent or emergent situation and correct equipment/implants were available  Time out: Immediately prior to the procedure a time out was called    Universal Protocol: the Joint Commission Universal Protocol was followed    Preparation: Patient was prepped and draped in usual sterile fashion           ANESTHESIA    Anesthesia: Local infiltration  Local Anesthetic:  Lidocaine 1% without epinephrine  Anesthetic Total (mL):  10      SEDATION    Patient Sedated: Yes    Sedation Type:  Moderate (conscious) sedation  Sedation:  Fentanyl and midazolam  Vital signs: Vital signs monitored during sedation    Fluoroscopy Time: 2 minute(s)  See dictated procedure note for full details.  Findings: Moderate sedation for TCVC placement - 4 mg of midazolam and 200 mcg of fentanyl. Patient tolerated the procedure well. No immediate complication.     Specimens: none    Complications: None    Condition: Stable    Plan: 1 hour bedrest.    PROCEDURE   Patient Tolerance:  Patient tolerated the procedure well with no immediate complications  Describe Procedure: Completed image-guided placement of 10 Maltese tien silicone double lumen tunneled  central venous catheter via right IJ. Aspirates and flushes freely, heparin locked and ready for immediate use. Tip in high right atrium.    Length of time physician/provider present for 1:1 monitoring during sedation: 30

## 2021-03-23 NOTE — DISCHARGE SUMMARY
[facility]  Hospitalist Discharge Summary      Date of Admission:  3/19/2021  Date of Discharge:  3/23/2021  Discharging Provider: Kristie Tom MD  Discharge Team: Hospitalist Service, Gold 11    Discharge Diagnoses   Sepsis 2/2 CLABSI secondary to staph epidermidis  Accidental tampering of Cm line leading to line infection with staph epidermidis  Staph epidermidis bacteremia  History of Celestino-en-Y gastric bypass  Esophagojejunostomy complicated by short gut syndrome  Severe malnutrition secondary to chronic illness on TPN  Chronic abdominal pain  Hypertension  ADHD  GERD  Asthma  Follow-ups Needed After Discharge   Follow-up Appointments     Adult [facility] Follow-up and recommended labs and tests      Follow up with primary care provider, Chuy Isaac, within 7 days for   hospital follow- up.  No follow up labs or test are needed.      Appointments on [place] and/or [facility] (with [facility]   provider or service). Call [phone] if you haven't heard regarding   these appointments within 7 days of discharge.         Follow Up and recommended labs and tests      No major follow up labs necessary unless you have symptoms             Unresulted Labs Ordered in the Past 30 Days of this Admission     Date and Time Order Name Status Description    3/22/2021 0001 Blood culture Preliminary     3/22/2021 0001 Blood culture Preliminary     3/21/2021 0000 Blood culture Preliminary     3/21/2021 0000 Blood culture Preliminary     3/20/2021 1621 Blood culture Preliminary     3/20/2021 1621 Blood culture Preliminary     3/19/2021 1607 Blood culture Preliminary     3/19/2021 1607 Blood culture Preliminary     3/19/2021 1359 Anaerobic catheter tip Preliminary       T    Discharge Disposition   Discharged to home  Condition at discharge: Stable      Hospital Course   In summary Mr. Acevedo is a 50-year-old male with past medical history of Celestino-en-Y gastric  bypass, esophagojejunostomy complicated by short gut syndrome, chronic abdominal pain, severe malnutrition TPN, history of recurrent line infections, hypertension, peptic ulcer disease, GERD who has a Cm line CVC in place for the purpose of continuing TPN.  He presented with fever, fatigue in the setting of accidental tampering of his Cm line by cutting the catheter accidentally.  He was scheduled for the revision of his Cm CVC by IR on 3/19, however was found to be febrile preprocedure so revision was held and catheter was removed by IR.  Patient was sent to emergency after that.  He had catheter culture growing staph epidermidis and was initially started on vancomycin and cefepime.  Infectious disease was consulted and  TPN was on hold due to lack of central access.  Subsequently his blood culture from 3/19 grew staph epidermidis so cefepime was discontinued and IV vancomycin was continued.  Surveillance blood culture from 3/20, 3/21 and 3/22 continue to be negative.  Infectious disease cleared patient to place PICC line given 72 hours of negative blood culture.  IR was consulted to place PICC line which was placed on 3/23.  Overall plan from infectious disease to complete 7 days vancomycin from 3/20.  He also grew strep bovis from anaerobic cath tip culture from 3/19 the significance of this is unclear, although there has been some association with colon cancer.  Patient would likely need outpatient colonoscopy and referral to GI for this was provided      Once PICC line is placed patient can continue his TPN through home [facility] home infusion  For chronic abdominal pain continue with PTA fentanyl 25 mcg patch every 48 hours and oxycodone 3 times a day as needed    For hypertension continue PTA carvedilol 6.25 twice daily    For ADHD continue PTA Adderall 20 mg daily    For GERD continue pantoprazole 40 mg daily    For asthma continue PTA albuterol inhaler 4 times a day as needed    Consultations  This Hospital Stay   PHARMACY TO DOSE VANCO  INFECTIOUS DISEASE GENERAL ADULT IP CONSULT  VASCULAR ACCESS CARE ADULT IP CONSULT  VASCULAR ACCESS CARE ADULT IP CONSULT  PHARMACY/NUTRITION TO START AND MANAGE TPN  CARE MANAGEMENT / SOCIAL WORK IP CONSULT  CARE MANAGEMENT / SOCIAL WORK IP CONSULT  INTERVENTIONAL RADIOLOGY ADULT/PEDS IP CONSULT  VASCULAR ACCESS CARE ADULT IP CONSULT  VASCULAR ACCESS CARE ADULT IP CONSULT    Code Status   Full Code    Time Spent on this Encounter   I, Baron Quintanilla MD, personally saw the patient today and spent greater than 30 minutes discharging this patient.       Kristie Tom MD  [facility] [address] [address]  Phone: [phone]  Fax: [phone]  ______________________________________________________________________    Physical Exam   Vital Signs: Temp: 98.1  F (36.7  C) Temp src: Oral BP: 120/81 Pulse: 55   Resp: 16 SpO2: 94 % O2 Device: None (Room air)    Weight: 179 lbs 0 oz  Physical Exam  Constitutional:       Appearance: Normal appearance.   HENT:      Head: Normocephalic.      Nose: Nose normal.      Mouth/Throat:      Mouth: Mucous membranes are moist.   Cardiovascular:      Rate and Rhythm: Normal rate and regular rhythm.      Heart sounds: No murmur. No gallop.       Comments: Right catheter CDI  Pulmonary:      Effort: Pulmonary effort is normal. No respiratory distress.      Breath sounds: No stridor. No wheezing.   Abdominal:      General: Abdomen is flat. There is no distension.      Palpations: There is no mass.      Tenderness: There is no abdominal tenderness.   Musculoskeletal:         General: No swelling, tenderness or deformity.   Skin:     Coloration: Skin is not jaundiced.   Neurological:      General: No focal deficit present.      Mental Status: He is alert and oriented to person, place, and time.      Cranial Nerves: No cranial nerve deficit.      Sensory: No sensory deficit.       Motor: No weakness.   Psychiatric:         Mood and Affect: Mood normal.         Behavior: Behavior normal.              Primary Care Physician   Chuy Isaac    Discharge Orders      Home care nursing referral      Home infusion referral      Reason for your hospital stay    Dear Parker Acevedo    Your were hospitalized at [facility] with catheter associated bacterial infection and treated with antibiotics and catheter removal.  Over your hospitalization your symptoms improved and today you are ready to be discharged to home .  If you continue current therapy you should continue to improve but if you develop fever, shortness of breath, light headedness, chest pain or picc line associated pain or redness please seek medical attention.    We are suggesting the following medication changes:  Continue vancomycin until 3/26     Please get the following tests done:  none    Please set up an appointment with:  Primary care in 1 week     It was a pleasure meeting with you today. Thank you for allowing me and my team the privilege of caring for you today. You are the reason we are here, and I truly hope we provided you with the excellent service you deserve. Please let us know if there is anything else we can do for you so that we can be sure you are leaving completely satisfied with your care experience.    Your hospital unit at the time of discharge is observation unit so if you have any questions please call the hospital at [phone] and ask to talk to a nurse on observation unit .    Take care!  Kristie Tom MD  Hospitalist Service  Pager [phone]     Adult [facility] Follow-up and recommended labs and tests    Follow up with primary care provider, Chuy Isaac, within 7 days for hospital follow- up.  No follow up labs or test are needed.      Appointments on [place] and/or [facility] (with [facility] or [facility] provider or service). Call [phone] if you haven't heard  regarding these appointments within 7 days of discharge.     Follow Up and recommended labs and tests    No major follow up labs necessary unless you have symptoms     Activity    Your activity upon discharge: activity as tolerated     IV access    **Ordering Provider MUST call/page Care Coordinator/ to discuss arranging this service**    You are going home with the following vascular access device: PICC.     Discharge Instructions    If worsening fever, chills, redness around PICC line site or discharge please contact your care team     Full Code     Diet    Follow this diet upon discharge: Orders Placed This Encounter      Snacks/Supplements Adult: Other; Boost Plus (amie/strawberry) BID.  Please also send Powerade (8 oz) at each of these snack times as well.; Between Meals      NPO per Anesthesia Guidelines for Procedure/Surgery Except for: Meds       Significant Results and Procedures   Results for orders placed or performed during the hospital encounter of 03/19/21   XR Chest 2 Views    Narrative    Exam: XR CHEST 2 VW, 3/19/2021 6:04 PM    Indication: fever    Comparison: 8/31/2020    Findings:   Portable AP and lateral radiographs of the chest. Previous right IJ  central venous catheter has been removed. The trachea is midline.  Cardiac silhouette is within normal limits. No pneumothorax or pleural  effusion. No focal airspace opacity. The visualized upper abdomen is  unremarkable. No acute osseous lesion.      Impression    Impression: No acute airspace disease.    I have personally reviewed the examination and initial interpretation  and I agree with the findings.    THALIA CABRALES MD   IR CVC Tunnel Placement > 5 Yrs of Age    Narrative    PRE-PROCEDURE DIAGNOSIS: Malnutrition    POST-PROCEDURE DIAGNOSIS: Same    PROCEDURE: Tunneled central venous catheter placement    Impression    IMPRESSION: Completed image-guided placement of 10 Bulgarian, Amck  silicone double lumen tunneled central  venous catheter via right  internal jugular vein. Catheter tip in right atrium. Aspirates and  flushes freely, heparin locked and ready for immediate use. No  complication.     ----------    CLINICAL HISTORY: Patient requires central venous access for therapy.  Tunneled central venous catheter placement requested.    PERFORMED BY: Per Dumont PA-C with Karla Jeter PA-C    CONSENT: Written informed consent was obtained and is documented in  the patient record.    SEDATION CONSENT: It was explained to the patient that medications  used for sedation and pain relief may be administered, if needed, to  reduce anxiety and discomfort that may be associated with the  procedure. Serious side effects from the medications are rare but may  include prolonged drowsiness and difficulty breathing, possibly  requiring placement of a breathing tube to ventilate the lungs.    MEDICATIONS: Intravenous sedation was administered with 4 mg midazolam  and 200 mcg fentanyl. A 10:1 volume mixture of 1% lidocaine without  epinephrine buffered with 8.4% bicarbonate solution was available for  local anesthesia. The catheter was heparin locked upon completion of  placement.    NURSING: The patient was placed on continuous vital signs monitoring.  Vital signs and sedation were monitored by nursing staff under IR  physician staff supervision.      SEDATION TIME: 30 minutes face-to-face    FLUOROSCOPY TIME: 2.0 minutes    DESCRIPTION: The right neck and upper chest were prepped and draped in  the usual sterile fashion.      Under ultrasound guidance, the right internal jugular vein was  identified and the overlying skin was anesthetized and skin  dermatotomy was made. Under ultrasound guidance, right internal  jugular venipuncture was made with needle. Image saved documenting  venipuncture and patency.    Needle was exchanged over guidewire for a dilator under fluoroscopic  guidance. Length to right atrium was measured with guidewire.  Guidewire  and inner dilator were removed. Wire was advanced into  inferior vena cava under fluoroscopic guidance and secured.     The anterior chest skin was anesthetized and incision was made after  measurements were taken. A cuffed catheter was subcutaneously tunneled  from the anterior chest incision to the internal jugular venipuncture  site after path of tunnel was anesthetized. The dilator was exchanged  over guidewire for a peel-away sheath. Guidewire was removed. Under  fluoroscopic guidance, the catheter was placed through the peel-away  sheath. Peel-away sheath was removed.      Final catheter position saved. Both catheter lumens adequately  aspirated and flushed. Each lumen was heparin locked. A catheter  retaining suture and sterile dressing were applied. The skin  dermatotomy site overlying the internal jugular venipuncture was  closed with topical adhesive.    COMPLICATIONS: No immediate concerns, the patient remained stable  throughout the procedure and tolerated it well.    ESTIMATED BLOOD LOSS: Minimal    SPECIMENS: None    REECE CARRIZALES PA-C     *Note: Due to a large number of results and/or encounters for the requested time period, some results have not been displayed. A complete set of results can be found in Results Review.       Discharge Medications   Current Discharge Medication List      START taking these medications    Details   diphenhydrAMINE (BENADRYL) 12.5 MG/5ML solution Take 10 mLs (25 mg) by mouth every 12 hours as needed (prior to vancomycin IV)  Qty: 80 mL, Refills: 0    Associated Diagnoses: Bacterial sepsis (H)      vancomycin 2,000 mg Inject 2,000 mg into the vein every 12 hours for 3 days    Associated Diagnoses: Bacterial sepsis (H)         CONTINUE these medications which have NOT CHANGED    Details   acetaminophen (TYLENOL) 32 mg/mL liquid Take 15.65 mLs (500 mg) by mouth every 4 hours as needed for fever or mild pain  Qty: 455 mL, Refills: 1    Associated Diagnoses: S/P bariatric  surgery      alteplase 2 mg/2 mL (in 10 mL syringe) Inject 1 mg into the vein as needed      amphetamine-dextroamphetamine (ADDERALL) 20 MG tablet TAKE ONE TABLET BY MOUTH ONCE DAILY  Qty: 30 tablet, Refills: 0    Associated Diagnoses: ADHD (attention deficit hyperactivity disorder), inattentive type      carvedilol (COREG) 6.25 MG tablet Take 6.25 mg by mouth 2 times daily (with meals)      cyanocobalamin (CYANOCOBALAMIN) 1000 MCG/ML injection Inject 1 mL (1,000 mcg) into the muscle every 30 days  Qty: 1 mL, Refills: 11    Associated Diagnoses: Vitamin B12 deficiency (non anemic)      fentaNYL (DURAGESIC) 25 mcg/hr 72 hr patch Place 1 patch onto the skin every 48 hours remove old patch.   May fill 03/08/21 and start 03/10/21  Qty: 15 patch, Refills: 0    Associated Diagnoses: Chronic abdominal pain; Chronic pain syndrome      Lidocaine (LIDOCARE) 4 % Patch Place 1 patch onto the skin every 24 hours To prevent lidocaine toxicity, patient should be patch free for 12 hrs daily.  Qty: 30 patch, Refills: 0    Associated Diagnoses: Chronic pain syndrome      lidocaine 7.5 mL, alum & mag hydroxide-simethicone 7.5 mL GI Cocktail Take 15 mLs by mouth once as needed for mouth, throat or stomach pain  Qty: 1000 mL, Refills: 1    Associated Diagnoses: Gastric bypass status for obesity      LORazepam (ATIVAN) 2 MG/ML (HIGH CONC) solution Take 0.5 mLs (1 mg) by mouth nightly as needed for anxiety or sleep  Qty: 30 mL, Refills: 0    Associated Diagnoses: Chronic anxiety      nicotine (NICODERM CQ) 21 MG/24HR 24 hr patch Place 1 patch onto the skin every 24 hours  Qty: 30 patch, Refills: 0    Associated Diagnoses: Former smoker      nystatin (MYCOSTATIN) 023573 UNIT/GM external cream Apply topically 2 times daily  Qty: 30 g, Refills: 0    Associated Diagnoses: Yeast infection of the skin      ondansetron (ZOFRAN-ODT) 8 MG ODT tab DISSOLVE ONE TABLET ON TONGUE EVERY 8 HOURS AS NEEDED FOR NAUSEA  Qty: 90 tablet, Refills: 1     "Associated Diagnoses: Nausea      oxyCODONE (ROXICODONE) 5 MG/5ML solution Take 5-10 mLs (5-10 mg) by mouth 3 times daily as needed for pain Max of 30mg/day. Fill 03/08/21 to start on/after 03/10/21. 30 day supply.  Qty: 900 mL, Refills: 0    Associated Diagnoses: Chronic abdominal pain      pantoprazole (PROTONIX) 40 MG EC tablet Take 1 tablet (40 mg) by mouth daily  Qty: 30 tablet, Refills: 5    Associated Diagnoses: S/P bariatric surgery      !! parenteral nutrition - PTA/DISCHARGE ORDER Patient receives 2050 mL TPN every 14 hours through PICC at [facility].      polyethylene glycol (MIRALAX) 17 g packet Take 1 packet by mouth 2 times daily      sucralfate (CARAFATE) 1 GM/10ML suspension Take 10 mLs (1 g) by mouth 4 times daily as needed  Qty: 420 mL, Refills: 1    Associated Diagnoses: Gastric bypass status for obesity      VENTOLIN  (90 Base) MCG/ACT inhaler INHALE TWO PUFFS BY MOUTH EVERY 4 HOURS AS NEEDED FOR SHORTNESS OF BREATH /DYSPNEA OR WHEEZING  Qty: 1 Inhaler, Refills: 1    Associated Diagnoses: Productive cough      vitamin D2 (ERGOCALCIFEROL) 13624 units (1250 mcg) capsule Take 1 capsule (50,000 Units) by mouth once a week  Qty: 12 capsule, Refills: 3    Associated Diagnoses: Vitamin D deficiency      EPINEPHrine (ANY BX GENERIC EQUIV) 0.3 MG/0.3ML injection 2-pack       !! [facility] INFUSION MANAGED PATIENT Contact [facility] for patient specific medication information at [phone] on admission and discharge from the hospital.  Phones are answered 24 hours a day 7 days a week 365 days a year.    Providers - Choose \"CONTINUE HOME MED (no script)\" at discharge if patient treatment with home infusion will continue.    Comments: Resume prior to admission TPN/Lipids/saline  Associated Diagnoses: S/P bariatric surgery      naloxone (NARCAN) 4 MG/0.1ML nasal spray Spray 1 spray (4 mg) into one nostril alternating nostrils as needed for opioid reversal every 2-3 " minutes until assistance arrives  Qty: 0.2 mL, Refills: 0    Associated Diagnoses: Chronic pain syndrome       !! - Potential duplicate medications found. Please discuss with provider.        Allergies   Allergies   Allergen Reactions     Bactrim [Sulfamethoxazole W/Trimethoprim] Rash     Penicillins Anaphylaxis     Please see Antimicrobial Management Team allergy assessment note 10/10/2018. Patient reported tolerating amoxicillin.     Doxycycline Rash     Vancomycin Rash     Rash after receiving vancomycin 3/28/16 (red man's?). Tolerated with slower infusion and diphenhydramine premed.

## 2021-03-23 NOTE — PROGRESS NOTES
[facility] SIGN OFF NOTE     Patient:  Parker Acevedo   YOB: 1970, MRN: [id number]  Date of Visit: 03/23/2021  Date of Admission: 3/19/2021  Consult Requester: Kristie Tom MD          Assessment and Recommendations:   ID Problem List:  1. Sepsis, with concern for intravascular non-hemodialysis catheter-related infection from (Cm catheter):  - s/p removal on 03/19  - preliminary culture data thus far with:  + >100 colonies of staph epidermidis (from 03/19 catheter tip culture)   + staph epidermidis (03/19 peripheral culture)  +strep bovis (03/19 from catheter tip culture)  - Hx of previous frequent polymicrobial line infections    2. Prior Celestino-en-Y gastric bypass, esophagojejunostomy:  - Complicated by short gut syndrome and chronic malnutrition requiring TPN.    Recommendations:  1. Plan to complete total seven day course of vancomycin for line related bacteremia (from date of line removal), with stop date of 03/26.    2. Given blood cultures from 03/20 onwards have remained negative, reasonable from ID standpoint to proceed with Cm catheter replacement.     3. Given new result of strep bovis, would recommend patient obtain out patient screening colonoscopy - this was relayed to patient.    Discussion:  Parker Acevedo is a 50 year old male with a PMH of Celestino-en-Y gastric bypass and esophagojejunostomy complicated by short gut syndrome and chronic malnutrition requiring TPN, as well as several incidents of polymicrobial central line infection resulting in line replacements; admitted on 03/19 with suspected recurrent episode. Patient inadvertently cut his cm catheter at home and shortly thereafter developed multiple systemic complaints of fevers/chills/mylagias/weakness/headache. He underwent IR guided line removal on 03/19, at which point he was noted to be febrile and sent to ED. Certainly, this presentation once again does raise concern for  potential recurrent line infection, especially in setting of him accidentally tampering with it at home. BC (1 from catheter tip & 1 from peripheral) thus far are positive for staph epidermidis and 1 BC from 03/19 (catheter tip) is positive for strep bovis. Patient has remained afebrile since then and has had clinical improvement while on vancomycin. In review of previous ID notes, it appears there have been logistical issues with arranging home infusions for patient with alternative antibiotics (other than vancomycin) and patient does have an anaphylactic allergy documented to PCN. As such, and in the setting of current staph epidermidis (and now strep bovis) bacteremia, we would recommend patient discharging home with plan to complete a seven week course of vancomycin (this should be more than adequate for bacteremia with these pathogens in setting of line removal that occurred on 03/19). BC from 03/20 onwards continue to remain negative, therefore it is reasonable for patient to have line replaced from ID perspective. Somewhat odd that strep bovis has been isolated from catheter tip only, unclear what true significance is of this or not. Regardless, would not alter plan outlined above, with continuation of vancomycin in the out patient setting. Given the association of strep bovis with colon cancer, did recommend patient that he proceed with out patient screening colonoscopy, which can be coordinated with his PCP. Overall patient is clinically well appearing, has remained HD stable, with repeat negative BC, which is all very reassuring.     Thank you for the consult. ID will sign off at this time. Please don't hesitate to call with any additional questions or concerns should they arise.      Elvira Greer DO, MPH  Infectious Disease Fellow, PGY-4   Pager: [phone]   03/23/2021     Discussed with Dr. Laws.         Interval History and Events:   Chart reviewed; no significant overnight events noted. Has  remained afebrile approx. last 48+ hours and otherwise HD stable. 2 BC from  (one from catheter tip and one from periphery) are positive for staph epidermidis, one anaerobic culture from catheter tip  is now positive for strep bovis as well. Subsequent BC from  on remain with NGTD. On evaluation presently, patient is well appearing and denies any new complaints. He reports that he is feeling well and back to baseline and is looking forward to going home soon.         Antimicrobial Treatment:   - Clindamycin ()  - Cefepime ( - )  - Vancomycin ( - )         Review of Systems:   Targeted 4 point ROS was completed with pertinent positives and negatives are detailed above.         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 Patient reports that he was at home cutting out pictures for a banner/poster for a family member's  a few days ago when he inadvertently cut his azevedo catheter. He notes that his wife capped it and dressed it immediately. However, shortly thereafter, patient began feeling generally unwell. He describes developing onset of subjective fevers/chills, as well as myalgias, generalized weakness, and headache. Patient was able to schedule an appointment yesterday,  for catheter replacement. However, while there, he was noted to be febrile and was subsequently sent to ED for evaluation. Line was removed at that time but not replaced. Prior to that, his azevedo catheter had been replaced on  following an admission  -  for polymicrobial line infection. In review of EMR, patient has had multiple admissions with similar  presentation and does have history of multiple previous line infections with a variety of organisms including: Streptococcus salivarius, Streptococcus mitis, Staphylococcus hominis, Stahylococcus capitis, Staphylococcus epidermidis, Enterococcus faecalis, Aerococcus viridans, Staphylococcus lugdunensis, Psychrobacter faecalis, Corynebacterium,  Finegoldia magna, Granulicatella adiacens, Streptococcus salivarius, Rothia mucilaginosa, Candida albicans. Patient notes that his current symptoms feel similar in nature to previous line infections. He otherwise denies any respiratory complaints, GI symptoms, urinary symptoms, or other issues at this time. Patient does have two dogs in the home, but reports that neither have been near his catheter site. No additional concerns or complaints at this time.     On arrival, patient was noted to be febrile up to 102.6, otherwise has remained HD stable. No leukocytosis and CMP, procalcitonin, and lactic acid have all been grossly normal.  CXR was obtained on admission was largely unremarkable; COVID-19 PCR negative. Patient received an intra-procedure dose of clindamycin and currently remains on cefepime/vancomycin.          Physical Examination:   Temp:  [97.9  F (36.6  C)-99.2  F (37.3  C)] 97.9  F (36.6  C)  Pulse:  [54-67] 56  Resp:  [10-18] 14  BP: (114-149)/() 131/96  SpO2:  [94 %-100 %] 96 %    No intake/output data recorded.    Vitals:    03/19/21 1444   Weight: 81.2 kg (179 lb)       Constitutional: Adult male seen resting comfortably in bed. Overall non-toxic in appearance. Pleasant and interactive.   HEENT: NC/AT,sclera clear, conjunctiva normal.  Respiratory: No increased work of breathing, CTAB, no crackles or wheezing.  Cardiovascular: RRR, no murmur noted. No peripheral edema.  Skin: Warm, dry, well-perfused. No active drainage at previous azevedo catheter. Otherwise, no bruising, bleeding, rashes, or lesions on limited exam.  Musculoskeletal: Extremities  grossly normal, non-tender, no edema. Good strength and ROM in bed.   Neurologic: A&O. Answers questions appropriately, speech normal. Moves all extremities spontaneously.  Neuropsychiatric: Calm. Affect appropriate to situation.  Vascular access: PIV in place are CDI, non-tender, no surrounding erythema.         Medications:       amphetamine-dextroamphetamine  20 mg Oral Daily     carvedilol  6.25 mg Oral BID w/meals     fentaNYL  25 mcg Transdermal Q72H     fentaNYL   Transdermal Q8H     Lidocaine  1 patch Transdermal Q24h     lidocaine   Transdermal Q8H     nicotine  1 patch Transdermal Q24H     nicotine   Transdermal Q8H     pantoprazole  40 mg Oral Daily     polyethylene glycol  17 g Oral BID     QUEtiapine  25 mg Oral At Bedtime     vancomycin (VANCOCIN) IV  2,000 mg Intravenous Q12H     vitamin D2  50,000 Units Oral Weekly       Antiinfectives:  Anti-infectives (From now, onward)    Start     Dose/Rate Route Frequency Ordered Stop    03/23/21 0000  vancomycin 2,000 mg      2,000 mg Intravenous EVERY 12 HOURS 03/23/21 0915 03/26/21 2359    03/21/21 0930  vancomycin (VANCOCIN) 2,000 mg in sodium chloride 0.9 % 500 mL intermittent infusion      2,000 mg  over 3 Hours Intravenous EVERY 12 HOURS 03/21/21 0858            Infusions/Drips:    dextrose 5% lactated ringers 100 mL/hr at 03/23/21 0225            Laboratory Data:   No results found for: ACD4    Microbiology:  Culture Micro   Date Value Ref Range Status   03/22/2021 No growth after 1 day  Preliminary   03/22/2021 No growth after 1 day  Preliminary   03/21/2021 No growth after 2 days  Preliminary   03/21/2021 No growth after 2 days  Preliminary   03/20/2021 No growth after 3 days  Preliminary   03/20/2021 No growth after 3 days  Preliminary   03/19/2021 (A)  Preliminary    Cultured on the 2nd day of incubation:  Staphylococcus epidermidis     03/19/2021   Preliminary    Critical Value/Significant Value, preliminary result only, called to and read back  by  Jerad Miranda RN at 13:05pm 3/21/21      03/19/2021   Preliminary    (Note)  POSITIVE for STAPHYLOCOCCUS EPIDERMIDIS and NEGATIVE for the mecA  gene (not resistant to methicillin) by Verigene nucleic acid test.  The mecA gene was not detected. Final identification and  antimicrobial susceptibility testing will be verified by standard  methods.    Specimen tested with Marfeeligene multiplex, gram-positive blood culture  nucleic acid test for the following targets: Staph aureus, Staph  epidermidis, Staph lugdunensis, other Staph species, Enterococcus  faecalis, Enterococcus faecium, Streptococcus species, S. agalactiae,  S. anginosus grp., S. pneumoniae, S. pyogenes, Listeria sp., mecA  (methicillin resistance) and Melvin/B (vancomycin resistance).    Critical Value/Significant Value called to and read back by Jerad Miranda RN at 1552 on 3.21.21      03/19/2021 No growth after 4 days  Preliminary       Inflammatory Markers    Recent Labs   Lab Test 03/22/21  0541 03/21/21  0734 01/29/21  2312 07/28/20  1607 07/28/20  1607 05/14/19  1145 05/14/19  1145 01/23/18  0845 01/23/18  0845   SED  --   --   --   --  10  --  13  --  10   CRP 38.0* 45.0* <2.9   < > <2.9   < >  --    < > <2.9    < > = values in this interval not displayed.       Metabolic Studies     Recent Labs   Lab Test 03/23/21  0756 03/22/21  0541 03/21/21  0734 03/19/21  1643 03/19/21  1643 06/01/18  1807 06/01/18  1807 07/23/13  1036 07/23/13  1036    142 142   < > 140   < > 145*   < > 143   POTASSIUM 3.4 3.5 3.2*   < > 3.2*   < > 3.5   < > 4.0   CHLORIDE 110* 111* 111*   < > 106   < > 109   < > 105   CO2 30 26 26   < > 26   < > 27   < > 29   ANIONGAP 3 4 6   < > 8   < > 10   < > 9   BUN 10 8 9   < > 8   < > 5*   < > 10   CR 0.70 0.68 0.66   < > 0.68   < > 0.59*   < > 0.72   GFRESTIMATED >90 >90 >90   < > >90   < > >90   < > >90   GLC 94 82 99   < > 97   < > 88   < > 88   A1C  --   --   --   --   --   --   --   --  4.9   SILAS 8.5 8.0* 8.0*   < > 8.7    < > 8.0*   < > 8.8   PHOS  --  3.9  --   --   --    < > 2.9   < >  --    MAG  --  2.1  --   --   --    < > 2.0   < >  --    LACT  --   --   --   --  0.9   < > 0.9   < >  --    CKT  --   --   --   --   --   --  52   < >  --     < > = values in this interval not displayed.       Hepatic Studies    Recent Labs   Lab Test 03/22/21  0541 03/19/21  1643 03/02/21  1225   BILITOTAL 0.2 0.5 0.3   ALKPHOS 50 73 73   ALBUMIN 2.7* 3.5 3.5   AST 12 12 7   ALT 20 26 27       Pancreatitis testing    Recent Labs   Lab Test 03/02/21  1225 09/29/19  1559 09/29/19  1559 05/23/19  1606 05/23/19  1606   LIPASE  --   --  58*  --  117   TRIG 141   < >  --    < >  --     < > = values in this interval not displayed.       Hematology Studies      Recent Labs   Lab Test 03/23/21  0756 03/22/21  0541 03/21/21  0734 03/20/21  0555   WBC 4.8 3.9* 4.4 5.8   ANEU  --   --   --  4.6   ALYM  --   --   --  0.5*   DACIA  --   --   --  0.7   AEOS  --   --   --  0.0   HGB 11.4* 11.2* 11.4* 12.9*   HCT 35.6* 34.9* 36.1* 40.3   * 120* 117* 133*       Arterial Blood Gas Testing    Recent Labs   Lab Test 09/19/17  1929   O2PER 21        Urine Studies     Recent Labs   Lab Test 03/19/21  1644 07/28/20  1905 11/03/19  0025   URINEPH 7.5* 6.5 6.0   NITRITE Negative Negative Negative   LEUKEST Negative Negative Negative   WBCU 1 <1 <1       Vancomycin Levels     Recent Labs   Lab Test 03/23/21  0756 03/22/21  1139 03/21/21  0734   VANCOMYCIN 13.3 10.6 10.2       Last check of C difficile  C Diff Toxin B PCR   Date Value Ref Range Status   07/15/2012   Final    Negative: Clostridium difficile target DNA sequences NOT detected, presumed   negative for Clostridium difficile toxin B or the number of bacteria present   may be below the limit of detection for the test.   FDA approved assay performed using [employer] real-time PCR.   A negative result does not exclude actual disease due to Clostridium difficile   and may be due to improper collection,  handling and storage of the specimen or   the number of organisms in the specimen is below the detection limit of the   assay.            Imaging:   CXR (03/19):   Impression: No acute airspace disease.

## 2021-03-23 NOTE — PROGRESS NOTES
[facility]    Medicine Progress Note - Hospitalist Service, Gold 11       Date of Admission:  3/19/2021  Assessment & Plan       Parker Acevedo is a 50 year old male with a past medical history of Celestino-en-Y gastric bypass, esophagojejunostomy c/b short gut syndrome, chronic abdominal pain, severe malnutrition on TPN, recurrent line infections, HTN, PUD, and GERD who presents with fever during revision of his Cm CVC by IR. He is now admitted to Internal Medicine for further treatment and monitoring.      Plan for today:  Infectious disease consulted   Currently on vancomycin  If blood culture from 3/20, 3/21, 3/22 continues to be negative then can place PICC line on 3/23 for TPN and ab- IR consulted for that  Need to complete 7 days therapy      CLABSI 2/2 staph epidermidis   Sepsis likely 2/2 CLABSI  And staph epidermidis bacteremia (pending culture result)    Patient presents with fever during pre-procedure evaluation for Cm line removal and revision. Had headache and not feeling well starting Monday that was when he accidentally cut his cm catheter and was found to be febrile during cm revision so the catheter was pulled out by IR on 3/19  Patient with history of recurrent line infections, with most recent admission 1/29/21 - 2/5/21. Blood cultures at that time positive for E. Faecalis, staph hominis, staph capitis, staph epidermidis, corynebacterium, and aerococcus.  Concerns for recurrent bacteremia 2/2 line infection vs other infectious etiology. COVID 19 negative 3/17.   Prelim culture from catheter tip is growing staph epidermidis -pending susceptibility   -Continue empiric antibiotics with PTD vancomycin -Cm catheter and blood cultures negative so far   -Obtain urinalysis with culture- no evidence of infection   -Add on chest x-ray to rule out pulmonary source of fever- no evidence of infiltrates   -ID consulted -appreciated  recs       Hx of Celestino-en-Y Gastric Bypass  Esophagojejunostomy c/b Short Gut Syndrome  Severe Malnutrition on TPN   Chronic Abdominal Pain - Most recent catheter placed on 2/5 by IR.  Patient notified IR that earlier this week he cut his line and is unable to use it. Plans initially for revision however patient febrile on presentation as above.  His Cm has since been removed.  -Start D5 0.9% NS at 100cc/hr  Currently receives 2050mL TPN Q14H through [facility].  -Will need eventual Cm CVC replacement once clear of fever/infection  -Continue PTA fentanyl 25mcg patch Q48H and oxycodone 5-10mLs TID PRN     -patient refused PPN for now   -need to have IR consulted once cleared of infection         HTN - Continue PTA carvedilol 6.25mg BID     ADHD - Continue PTA Adderall 20mg daily     GERD - Continue PTA pantoprazole 40mg daily     Asthma - Continue PTA albuterol inhaler Q4H PRN              Diet:  Regular Adult Diet  DVT Prophylaxis: Pneumatic Compression Devices  Sanchez Catheter: not present  Code Status:  Full Code Status            Disposition Plan   Expected discharge: 2 - 3 days, recommended to prior living arrangement once ab plan has been established and plan for line by IR has been established   Entered: Kristie Tom MD 03/22/2021, 9:02 PM       The patient's care was discussed with the Bedside Nurse.    Kristie Tom MD  Hospitalist Service, [address]  Contact information available via [facility] Paging/Directory  Please see sign in/sign out for up to date coverage information  ______________________________________________________________________    Interval History   Denies any new symptoms   Ongoing headache, fatigue; denies chest pain, shortness of breath, urinary symptoms or abdominal pain or diarrhea   Felt his temp going up to 101 when ab was off for few hours yesterday   Data reviewed today: I reviewed all medications, new  labs and imaging results over the last 24 hours. I personally reviewed chest xray with no infiltrates     Physical Exam   Vital Signs: Temp: 99.2  F (37.3  C) Temp src: Oral BP: (!) 134/90 Pulse: 63   Resp: 18 SpO2: 97 % O2 Device: None (Room air)    Weight: 179 lbs 0 oz  General Appearance: Patient in no acute distress   Respiratory: bilateral equal breath sounds with no added sounds   Cardiovascular: s1s2 with no murmur   GI: soft, non tender, bowel sounds noted; J tube in place for venting   Skin: no rash   Other: AAOX3 ,b/L UE and LE-5/5     Data   Recent Labs   Lab 03/22/21  0541 03/21/21  0734 03/20/21  0555 03/19/21  1643   WBC 3.9* 4.4 5.8 6.6   HGB 11.2* 11.4* 12.9* 14.0   MCV 93 95 94 93   * 117* 133* 147*   INR 1.10  --   --   --     142 141 140   POTASSIUM 3.5 3.2* 3.6 3.2*   CHLORIDE 111* 111* 111* 106   CO2 26 26 26 26   BUN 8 9 7 8   CR 0.68 0.66 0.71 0.68   ANIONGAP 4 6 5 8   SILAS 8.0* 8.0* 8.1* 8.7   GLC 82 99 123* 97   ALBUMIN 2.7*  --   --  3.5   PROTTOTAL 5.8*  --   --  7.0   BILITOTAL 0.2  --   --  0.5   ALKPHOS 50  --   --  73   ALT 20  --   --  26   AST 12  --   --  12

## 2021-03-23 NOTE — PHARMACY-VANCOMYCIN DOSING SERVICE
Pharmacy Vancomycin Note  Date of Service 2021  Patient's  1970   50 year old, male    Indication: Bacteremia  Goal Trough Level: 15-20 mg/L  Day of Therapy: 5  Current Vancomycin regimen:  2000 mg IV q12h    Current estimated CrCl = Estimated Creatinine Clearance: 148.2 mL/min (based on SCr of 0.7 mg/dL).    Creatinine for last 3 days  3/21/2021:  7:34 AM Creatinine 0.66 mg/dL  3/22/2021:  5:41 AM Creatinine 0.68 mg/dL  3/23/2021:  7:56 AM Creatinine 0.70 mg/dL    Recent Vancomycin Levels (past 3 days)  3/21/2021:  7:34 AM Vancomycin Level 10.2 mg/L  3/22/2021: 11:39 AM Vancomycin Level 10.6 mg/L  3/23/2021:  7:56 AM Vancomycin Level 13.3 mg/L    Vancomycin IV Administrations (past 72 hours)                   vancomycin (VANCOCIN) 2,000 mg in sodium chloride 0.9 % 500 mL intermittent infusion (mg) 2,000 mg Started 21     2,000 mg New Bag       2,000 mg New Bag 21     2,000 mg New Bag  21    vancomycin (VANCOCIN) 1,750 mg in sodium chloride 0.9 % 500 mL intermittent infusion (mg) 1,750 mg New Bag 21                Nephrotoxins and other renal medications (From now, onward)    Start     Dose/Rate Route Frequency Ordered Stop    21 0000  vancomycin 2,000 mg      2,000 mg Intravenous EVERY 12 HOURS 21 0915 21 2359    21 0930  vancomycin (VANCOCIN) 2,000 mg in sodium chloride 0.9 % 500 mL intermittent infusion      2,000 mg  over 3 Hours Intravenous EVERY 12 HOURS 21 0858               Contrast Orders - past 72 hours (72h ago, onward)    None          Interpretation of levels and current regimen:  Trough level is  Subtherapeutic however it appears patient was not given AM dose of vancomycin yesterday so expect true trough to fall between goal trough 15-20 mg/L.    Has serum creatinine changed > 50% in last 72 hours: No    Urine output:  good urine output    Renal Function: Stable    Plan:  1.  Continue Current Dose  2.  Pharmacy will  check trough levels as appropriate in 1-3 Days.    3. Serum creatinine levels will be ordered daily for the first week of therapy and at least twice weekly for subsequent weeks.      Eryn Morin RPH        .

## 2021-03-23 NOTE — CONSULTS
Interventional Radiology Consult Service Note    Patient is on IR schedule 3/23 for a DL TCVC placement, silicone line requested.   Labs WNL for procedure. COVID neg.   Orders for NPO have been entered. Pt is currently receiving IV antibiotics for bacteremia.  Consent will be done prior to procedure.     Please contact the IR charge RN at [phone] for estimated time of procedure.     Case discussed with Dr. Fitch from IR and Dr. Quintanilla. This is a 50 year old male with PMH significant for Celestino-en-Y gastric bypass, esophagojejunostomy c/b short gut syndrome and chronic malnutrition on TPN, and history of recurrent line-related infections. IR last placed a DL silicone catheter on 2/5 for TPN/IV antibiotics. Pt called IR as an outpatient last week and reported he had inadvertently cut his catheter. He presented 3/19 for line replacement and was found to be febrile. The line was removed and the patient was sent to the ED for evaluation. Blood Cx from 3/19 and Catheter culture from 3/19 both with Staph epidermidis. Now with negative blood cultures 3/20-3/23. ID has approved line replacement. They again request a silicone catheter to allow for ethanol locks.    Expected date of discharge: TBD    Vitals:   /81 (BP Location: Left arm)   Pulse 55   Temp 98.1  F (36.7  C) (Oral)   Resp 16   Wt 81.2 kg (179 lb)   SpO2 94%   BMI 24.97 kg/m      Pertinent Labs:     Lab Results   Component Value Date    WBC 4.8 03/23/2021    WBC 3.9 (L) 03/22/2021    WBC 4.4 03/21/2021       Lab Results   Component Value Date    HGB 11.4 03/23/2021    HGB 11.2 03/22/2021    HGB 11.4 03/21/2021       Lab Results   Component Value Date     03/23/2021     03/22/2021     03/21/2021       Lab Results   Component Value Date    INR 1.10 03/22/2021    PTT 26 07/22/2019       Lab Results   Component Value Date    POTASSIUM 3.4 03/23/2021        Patti Garvey, APRN CNP  Interventional Radiology  Pager:  [phone]

## 2021-03-23 NOTE — PLAN OF CARE
Shift:  0700 - 1430  Reason for admission: Fever, Staph Infection, PICC Line Replacement  Vitals: AVSS  BP (!) 131/96 (BP Location: Left arm)   Pulse 56   Temp 97.9  F (36.6  C) (Oral)   Resp 14   Wt 87.1 kg (192 lb)   SpO2 96%   BMI 26.78 kg/m    Activity: Independent/SBA, pt uses cane at baseline while at home  Pain: pain to abdomen and new PICC location, prn oxy given with some relief  Neuro: A&O, makes needs know  Cardiac: WNL  Respiratory: WNL  GI/: JTube to LUQ, clean & dry; BS active; Voiding spontaneously without difficulty  Diet: NPO  Lines: PIV x2: left piv occuded and removed, new piv placed to right lower forearm Infusing D5LR @ 100/hr and Vanco; new DL PICC placed to right chest, hep locked.   Wounds/Incisions: n/a  Labs/imaging/Consults: Vanco level 13.3, IR completed placement of new PICC.  Discharge Plan: Care Coordinator completed arrangements for home infusion and vanco. Patient's wife is coming from [place], pt is ready for discharge.     Marnie Escobar RN

## 2021-03-23 NOTE — PRE-PROCEDURE
GENERAL PRE-PROCEDURE:   Procedure:  TCVC placement  Date/Time:  3/23/2021 11:13 AM    Verbal consent obtained?: Yes    Written consent obtained?: Yes    Risks and benefits: Risks, benefits and alternatives were discussed    Consent given by:  Patient  Patient states understanding of procedure being performed: Yes    Patient's understanding of procedure matches consent: Yes    Procedure consent matches procedure scheduled: Yes    Expected level of sedation:  Moderate  Appropriately NPO:  Yes  ASA Class:  Class 2- mild systemic disease, no acute problems, no functional limitations  Mallampati  :  Grade 2- soft palate, base of uvula, tonsillar pillars, and portion of posterior pharyngeal wall visible  Lungs:  Lungs clear with good breath sounds bilaterally  Heart:  Normal heart sounds and rate  History & Physical reviewed:  History and physical reviewed and no updates needed  Statement of review:  I have reviewed the lab findings, diagnostic data, medications, and the plan for sedation

## 2021-03-23 NOTE — PROGRESS NOTES
Shift: 2722-9290     Neuro:  A&OX4, able to make needs known    Cardiac: WDL         Respiratory: WDL    GI/: voids spontaneously, without difficulty. J tube LUQ, clean & dry.     Diet/appetite: NPO for PICC placement in AM    Activity: up ad vitor, steady and independent     Pain: reports abdominal pain 8/10. Pain improved with PRN oxycodone. Patient is sleeping comfortably.     Skin: WDL    Lines: PIV left arm infusing D5 in LR @ 100mL/hr    /77 (BP Location: Left arm)   Pulse 65   Temp 98.2  F (36.8  C) (Oral)   Resp 14   Wt 81.2 kg (179 lb)   SpO2 96%   BMI 24.97 kg/m       Pt tolerated vancomycin IV dose overnight with prophylactic benadryl dose. No notable changes. Will continue current plan of care.

## 2021-03-24 ENCOUNTER — HOME INFUSION (PRE-WILLOW HOME INFUSION) (OUTPATIENT)
Dept: PHARMACY | Facility: CLINIC | Age: 51
End: 2021-03-24

## 2021-03-24 ENCOUNTER — PATIENT OUTREACH (OUTPATIENT)
Dept: CARE COORDINATION | Facility: CLINIC | Age: 51
End: 2021-03-24

## 2021-03-24 LAB
BACTERIA SPEC CULT: ABNORMAL
SPECIMEN SOURCE: ABNORMAL

## 2021-03-24 NOTE — PROGRESS NOTES
The patient was contacted by another RN for post-hospital follow up. Will close encounter at this time.    Samantha Mazariegos CMA, [facility]  Post Hospital Discharge Team

## 2021-03-24 NOTE — PROGRESS NOTES
This is a recent snapshot of the patient's [facility] medical record.  For current drug dose and complete information and questions, call [phone] or In Basket pool, fv home infusion ([id number])  CSN Number:  [id number]

## 2021-03-24 NOTE — PROGRESS NOTES
Clinic Care Coordination Contact  Care Team Conversations    The RN CC nurse care coordinator did a chart review of the patient's chart.  The patient had an accidental cut of his azevedo catheter allowing for contamination of the catheter by staph epidermidis which was removed by IR.  The patient then had a PICC placed in order to receive the remaining doses of vancomycin as well as the TPN through [facility].  All other PTA medications remain unchanged.  [facility] will resume cares with the patient.      Follow up needed  Chuy Isaac MD  PCP    GI referral for a colonoscopy     The patient was hospitalized for 4 days 3/19-3/23.    The writer made a call to the home of the patient and spoke with the spouse.  All homecare and infusion services resumed without any difficulties.  Currently there are no questions or concerns regarding medications or treatments.  The writer gave her direct dial number for the interim when the lead care coordinator is out of the office.      The writer will forward the information to the lead care coordinator to contact the patient when she returns to the office.      Sesar Novak MSN, RN, PHN, CCM   Primary Care Clinical RN Care Coordinator  [facility]  3/24/2021   11:01 AM  [email or web address]  Office: [phone]

## 2021-03-25 ENCOUNTER — HOSPITAL ENCOUNTER (OUTPATIENT)
Dept: LAB | Facility: CLINIC | Age: 51
Discharge: HOME | End: 2021-03-25
Attending: SURGERY | Admitting: SURGERY
Payer: COMMERCIAL

## 2021-03-25 ENCOUNTER — MEDICAL CORRESPONDENCE (OUTPATIENT)
Dept: HEALTH INFORMATION MANAGEMENT | Facility: CLINIC | Age: 51
End: 2021-03-25

## 2021-03-25 ENCOUNTER — MYC REFILL (OUTPATIENT)
Dept: INTERNAL MEDICINE | Facility: CLINIC | Age: 51
End: 2021-03-25

## 2021-03-25 ENCOUNTER — MYC REFILL (OUTPATIENT)
Dept: PALLIATIVE MEDICINE | Facility: CLINIC | Age: 51
End: 2021-03-25

## 2021-03-25 DIAGNOSIS — G89.4 CHRONIC PAIN SYNDROME: ICD-10-CM

## 2021-03-25 DIAGNOSIS — R10.9 CHRONIC ABDOMINAL PAIN: ICD-10-CM

## 2021-03-25 DIAGNOSIS — G89.29 CHRONIC ABDOMINAL PAIN: ICD-10-CM

## 2021-03-25 DIAGNOSIS — F41.9 CHRONIC ANXIETY: ICD-10-CM

## 2021-03-25 DIAGNOSIS — F90.0 ADHD (ATTENTION DEFICIT HYPERACTIVITY DISORDER), INATTENTIVE TYPE: ICD-10-CM

## 2021-03-25 LAB
ALBUMIN SERPL-MCNC: 3.1 G/DL (ref 3.4–5)
ALP SERPL-CCNC: 65 U/L (ref 40–150)
ALT SERPL W P-5'-P-CCNC: 22 U/L (ref 0–70)
ANION GAP SERPL CALCULATED.3IONS-SCNC: 3 MMOL/L (ref 3–14)
AST SERPL W P-5'-P-CCNC: 11 U/L (ref 0–45)
BACTERIA SPEC CULT: NO GROWTH
BASOPHILS # BLD AUTO: 0.1 10E9/L (ref 0–0.2)
BASOPHILS NFR BLD AUTO: 0.7 %
BILIRUB DIRECT SERPL-MCNC: <0.1 MG/DL (ref 0–0.2)
BILIRUB SERPL-MCNC: 0.4 MG/DL (ref 0.2–1.3)
BUN SERPL-MCNC: 10 MG/DL (ref 7–30)
CALCIUM SERPL-MCNC: 8.6 MG/DL (ref 8.5–10.1)
CHLORIDE SERPL-SCNC: 111 MMOL/L (ref 94–109)
CO2 SERPL-SCNC: 29 MMOL/L (ref 20–32)
CREAT SERPL-MCNC: 0.58 MG/DL (ref 0.66–1.25)
CRP SERPL-MCNC: 9.5 MG/L (ref 0–8)
DIFFERENTIAL METHOD BLD: ABNORMAL
EOSINOPHIL # BLD AUTO: 0.3 10E9/L (ref 0–0.7)
EOSINOPHIL NFR BLD AUTO: 4.1 %
ERYTHROCYTE [DISTWIDTH] IN BLOOD BY AUTOMATED COUNT: 15.6 % (ref 10–15)
FERRITIN SERPL-MCNC: 54 NG/ML (ref 26–388)
GFR SERPL CREATININE-BSD FRML MDRD: >90 ML/MIN/{1.73_M2}
GLUCOSE SERPL-MCNC: 109 MG/DL (ref 70–99)
HCT VFR BLD AUTO: 36.4 % (ref 40–53)
HGB BLD-MCNC: 11.9 G/DL (ref 13.3–17.7)
IMM GRANULOCYTES # BLD: 0 10E9/L (ref 0–0.4)
IMM GRANULOCYTES NFR BLD: 0.3 %
LYMPHOCYTES # BLD AUTO: 1.8 10E9/L (ref 0.8–5.3)
LYMPHOCYTES NFR BLD AUTO: 26.9 %
MAGNESIUM SERPL-MCNC: 1.9 MG/DL (ref 1.6–2.3)
MCH RBC QN AUTO: 30.4 PG (ref 26.5–33)
MCHC RBC AUTO-ENTMCNC: 32.7 G/DL (ref 31.5–36.5)
MCV RBC AUTO: 93 FL (ref 78–100)
MONOCYTES # BLD AUTO: 0.6 10E9/L (ref 0–1.3)
MONOCYTES NFR BLD AUTO: 9.3 %
NEUTROPHILS # BLD AUTO: 4 10E9/L (ref 1.6–8.3)
NEUTROPHILS NFR BLD AUTO: 58.7 %
NRBC # BLD AUTO: 0 10*3/UL
NRBC BLD AUTO-RTO: 0 /100
PHOSPHATE SERPL-MCNC: 3.2 MG/DL (ref 2.5–4.5)
PLATELET # BLD AUTO: 198 10E9/L (ref 150–450)
POTASSIUM SERPL-SCNC: 3.7 MMOL/L (ref 3.4–5.3)
PROT SERPL-MCNC: 6.3 G/DL (ref 6.8–8.8)
RBC # BLD AUTO: 3.92 10E12/L (ref 4.4–5.9)
SODIUM SERPL-SCNC: 143 MMOL/L (ref 133–144)
SPECIMEN SOURCE: NORMAL
TRIGL SERPL-MCNC: 70 MG/DL
WBC # BLD AUTO: 6.8 10E9/L (ref 4–11)

## 2021-03-25 PROCEDURE — 86140 C-REACTIVE PROTEIN: CPT | Performed by: SURGERY

## 2021-03-25 PROCEDURE — 84478 ASSAY OF TRIGLYCERIDES: CPT | Mod: GZ | Performed by: SURGERY

## 2021-03-25 PROCEDURE — 82248 BILIRUBIN DIRECT: CPT | Performed by: SURGERY

## 2021-03-25 PROCEDURE — 84100 ASSAY OF PHOSPHORUS: CPT | Performed by: SURGERY

## 2021-03-25 PROCEDURE — 82728 ASSAY OF FERRITIN: CPT | Performed by: SURGERY

## 2021-03-25 PROCEDURE — 85025 COMPLETE CBC W/AUTO DIFF WBC: CPT | Mod: GZ | Performed by: SURGERY

## 2021-03-25 PROCEDURE — 80053 COMPREHEN METABOLIC PANEL: CPT | Performed by: SURGERY

## 2021-03-25 PROCEDURE — 83735 ASSAY OF MAGNESIUM: CPT | Performed by: SURGERY

## 2021-03-25 NOTE — PROGRESS NOTES
This is a recent snapshot of the patient's [facility] medical record.  For current drug dose and complete information and questions, call [phone] or In Basket pool, fv home infusion ([id number])  CSN Number:  [id number]

## 2021-03-26 LAB
BACTERIA SPEC CULT: ABNORMAL
BACTERIA SPEC CULT: ABNORMAL
BACTERIA SPEC CULT: NO GROWTH
BACTERIA SPEC CULT: NO GROWTH
Lab: ABNORMAL
SPECIMEN SOURCE: ABNORMAL
SPECIMEN SOURCE: NORMAL
SPECIMEN SOURCE: NORMAL

## 2021-03-26 RX ORDER — LORAZEPAM 2 MG/ML
1 CONCENTRATE ORAL
Qty: 30 ML | Refills: 0 | Status: SHIPPED | OUTPATIENT
Start: 2021-03-26 | End: 2021-04-28 | Stop reason: SINTOL

## 2021-03-26 RX ORDER — OXYCODONE HCL 5 MG/5 ML
5-10 SOLUTION, ORAL ORAL 3 TIMES DAILY PRN
Qty: 900 ML | Refills: 0 | Status: SHIPPED | OUTPATIENT
Start: 2021-03-26 | End: 2021-04-23

## 2021-03-26 RX ORDER — DEXTROAMPHETAMINE SACCHARATE, AMPHETAMINE ASPARTATE, DEXTROAMPHETAMINE SULFATE AND AMPHETAMINE SULFATE 5; 5; 5; 5 MG/1; MG/1; MG/1; MG/1
TABLET ORAL
Qty: 30 TABLET | Refills: 0 | Status: SHIPPED | OUTPATIENT
Start: 2021-03-26 | End: 2021-04-22

## 2021-03-26 RX ORDER — FENTANYL 25 UG/1
1 PATCH TRANSDERMAL
Qty: 15 PATCH | Refills: 0 | Status: SHIPPED | OUTPATIENT
Start: 2021-03-26 | End: 2021-04-23

## 2021-03-26 NOTE — TELEPHONE ENCOUNTER
Received call from patient requesting refill(s) of      oxyCODONE (ROXICODONE) 5 MG/5ML solution   Last dispensed from pharmacy on 03/09/21    fentaNYL (DURAGESIC) 25 mcg/hr 72 hr patch  Last dispensed from pharmacy on 03/09/21    Patient's last office/virtual visit by prescribing provider on 01/29/21  Next office/virtual appointment scheduled for None    Last urine drug screen date 01/31/21  Current opioid agreement on file (completed within the last year) No Date of opioid agreement: 01/05/21    E-prescribe to   [facility] [place], [place] - [address] [place] [address]  Phone: [phone] Fax: [phone]    Will route to nursing [place] for review and preparation of prescription(s).       Deidra Banks MA  [facility]

## 2021-03-26 NOTE — TELEPHONE ENCOUNTER
Medication refill information reviewed.     Due date for  oxyCODONE (ROXICODONE) 5 MG/5ML solution      fentaNYL (DURAGESIC) 25 mcg/hr 72 hr patch is 04/09/21     Prescriptions prepped for review.     Will route to provider.

## 2021-03-26 NOTE — TELEPHONE ENCOUNTER
Requested Prescriptions   Pending Prescriptions Disp Refills     LORazepam (ATIVAN) 2 MG/ML (HIGH CONC) solution 30 mL 0     Sig: Take 0.5 mLs (1 mg) by mouth nightly as needed for anxiety or sleep     Last Written Prescription Date:  02/22/2021  Last Fill Quantity: 30ml,   # refills: 0  Last Office Visit: 02/22/2021  Future Office visit:       Routing refill request to provider for review/approval because:  Drug not on the [facility], P or  Health refill protocol or controlled substance              amphetamine-dextroamphetamine (ADDERALL) 20 MG tablet 30 tablet 0     Sig: TAKE ONE TABLET BY MOUTH ONCE DAILY     Last Written Prescription Date:  03/01/2021  Last Fill Quantity: 30,   # refills: 0  Last Office Visit: 02/22/2021  Future Office visit:       Routing refill request to provider for review/approval because:  Drug not on the G, P or  Health refill protocol or controlled substance    Nicolette Rothman MA

## 2021-03-26 NOTE — TELEPHONE ENCOUNTER
Script Eprescribed to pharmacy  [place] Prescription Monitoring [place] checked    Will send this to MA team to notify patient.    Signed Prescriptions:                        Disp   Refills    oxyCODONE (ROXICODONE) 5 MG/5ML solution   900 mL 0        Sig: Take 5-10 mLs (5-10 mg) by mouth 3 times daily as           needed for pain Max of 30mg/day. Fill 04/07/21 to           start on/after 04/09/21. 30 day supply.  Authorizing Provider: BRANDYN JENKINS    fentaNYL (DURAGESIC) 25 mcg/hr 72 hr patch 15 pat*0        Sig: Place 1 patch onto the skin every 48 hours remove old           patch.  May fill 04/07/21 and start 04/09/21  Authorizing Provider: BRANDYN JENKINS MD  [facility]

## 2021-03-26 NOTE — PROGRESS NOTES
This is a recent snapshot of the patient's [facility] medical record.  For current drug dose and complete information and questions, call [phone] or In Basket pool, fv home infusion ([id number])  CSN Number:  [id number]

## 2021-03-26 NOTE — TELEPHONE ENCOUNTER
Refills have been requested for the following medications:         oxyCODONE (ROXICODONE) 5 MG/5ML solution [Patti Milligan MD]         fentaNYL (DURAGESIC) 25 mcg/hr 72 hr patch [Patti Milligan MD]     Preferred pharmacy: [address]        Medication renewals requested in this message routed separately:         LORazepam (ATIVAN) 2 MG/ML (HIGH CONC) solution [Chuy Isaac MD]         amphetamine-dextroamphetamine (ADDERALL) 20 MG tablet [Chuy Isaac MD]

## 2021-03-27 LAB
BACTERIA SPEC CULT: NO GROWTH
BACTERIA SPEC CULT: NO GROWTH
Lab: NORMAL
SPECIMEN SOURCE: NORMAL
SPECIMEN SOURCE: NORMAL

## 2021-03-29 ENCOUNTER — HOME INFUSION (PRE-WILLOW HOME INFUSION) (OUTPATIENT)
Dept: PHARMACY | Facility: CLINIC | Age: 51
End: 2021-03-29

## 2021-03-29 ENCOUNTER — HOSPITAL ENCOUNTER (OUTPATIENT)
Dept: LAB | Facility: CLINIC | Age: 51
Discharge: HOME | End: 2021-03-29
Attending: SURGERY | Admitting: SURGERY
Payer: COMMERCIAL

## 2021-03-29 LAB
ALBUMIN SERPL-MCNC: 3.3 G/DL (ref 3.4–5)
ALP SERPL-CCNC: 71 U/L (ref 40–150)
ALT SERPL W P-5'-P-CCNC: 36 U/L (ref 0–70)
ANION GAP SERPL CALCULATED.3IONS-SCNC: 8 MMOL/L (ref 3–14)
AST SERPL W P-5'-P-CCNC: 22 U/L (ref 0–45)
BASOPHILS # BLD AUTO: 0 10E9/L (ref 0–0.2)
BASOPHILS NFR BLD AUTO: 0.5 %
BILIRUB DIRECT SERPL-MCNC: <0.1 MG/DL (ref 0–0.2)
BILIRUB SERPL-MCNC: 0.2 MG/DL (ref 0.2–1.3)
BUN SERPL-MCNC: 11 MG/DL (ref 7–30)
CALCIUM SERPL-MCNC: 8.3 MG/DL (ref 8.5–10.1)
CHLORIDE SERPL-SCNC: 109 MMOL/L (ref 94–109)
CO2 SERPL-SCNC: 26 MMOL/L (ref 20–32)
CREAT SERPL-MCNC: 0.63 MG/DL (ref 0.66–1.25)
DIFFERENTIAL METHOD BLD: ABNORMAL
EOSINOPHIL # BLD AUTO: 0.2 10E9/L (ref 0–0.7)
EOSINOPHIL NFR BLD AUTO: 2.8 %
ERYTHROCYTE [DISTWIDTH] IN BLOOD BY AUTOMATED COUNT: 15.5 % (ref 10–15)
GFR SERPL CREATININE-BSD FRML MDRD: >90 ML/MIN/{1.73_M2}
GLUCOSE SERPL-MCNC: 94 MG/DL (ref 70–99)
HCT VFR BLD AUTO: 39.2 % (ref 40–53)
HGB BLD-MCNC: 12.9 G/DL (ref 13.3–17.7)
IMM GRANULOCYTES # BLD: 0 10E9/L (ref 0–0.4)
IMM GRANULOCYTES NFR BLD: 0.4 %
LYMPHOCYTES # BLD AUTO: 2.1 10E9/L (ref 0.8–5.3)
LYMPHOCYTES NFR BLD AUTO: 26.8 %
MAGNESIUM SERPL-MCNC: 1.8 MG/DL (ref 1.6–2.3)
MCH RBC QN AUTO: 30.5 PG (ref 26.5–33)
MCHC RBC AUTO-ENTMCNC: 32.9 G/DL (ref 31.5–36.5)
MCV RBC AUTO: 93 FL (ref 78–100)
MONOCYTES # BLD AUTO: 0.7 10E9/L (ref 0–1.3)
MONOCYTES NFR BLD AUTO: 9.1 %
NEUTROPHILS # BLD AUTO: 4.7 10E9/L (ref 1.6–8.3)
NEUTROPHILS NFR BLD AUTO: 60.4 %
NRBC # BLD AUTO: 0 10*3/UL
NRBC BLD AUTO-RTO: 0 /100
PHOSPHATE SERPL-MCNC: 3.1 MG/DL (ref 2.5–4.5)
PLATELET # BLD AUTO: 251 10E9/L (ref 150–450)
POTASSIUM SERPL-SCNC: 3.3 MMOL/L (ref 3.4–5.3)
PROT SERPL-MCNC: 6.7 G/DL (ref 6.8–8.8)
RBC # BLD AUTO: 4.23 10E12/L (ref 4.4–5.9)
SODIUM SERPL-SCNC: 143 MMOL/L (ref 133–144)
TRIGL SERPL-MCNC: 129 MG/DL
WBC # BLD AUTO: 7.8 10E9/L (ref 4–11)

## 2021-03-29 PROCEDURE — 84100 ASSAY OF PHOSPHORUS: CPT | Performed by: SURGERY

## 2021-03-29 PROCEDURE — 82248 BILIRUBIN DIRECT: CPT | Performed by: SURGERY

## 2021-03-29 PROCEDURE — 85025 COMPLETE CBC W/AUTO DIFF WBC: CPT | Performed by: SURGERY

## 2021-03-29 PROCEDURE — 84478 ASSAY OF TRIGLYCERIDES: CPT | Performed by: SURGERY

## 2021-03-29 PROCEDURE — 80053 COMPREHEN METABOLIC PANEL: CPT | Performed by: SURGERY

## 2021-03-29 PROCEDURE — 83735 ASSAY OF MAGNESIUM: CPT | Performed by: SURGERY

## 2021-03-30 DIAGNOSIS — F90.0 ADHD (ATTENTION DEFICIT HYPERACTIVITY DISORDER), INATTENTIVE TYPE: ICD-10-CM

## 2021-03-30 RX ORDER — LORAZEPAM 1 MG/1
TABLET ORAL
Qty: 30 TABLET | Refills: 0 | Status: SHIPPED | OUTPATIENT
Start: 2021-03-30 | End: 2021-04-26

## 2021-03-30 NOTE — TELEPHONE ENCOUNTER
Requested Prescriptions   Pending Prescriptions Disp Refills     LORazepam (ATIVAN) 1 MG tablet [Pharmacy Med Name: LORAZEPAM 1MG TABS] 30 tablet 0     Sig: TAKE 1 TABLET (1MG) BY MOUTH AS NEEDED FOR ANXIETY WITH TPN AND MEDICATIONS . JUST ONCE A DAY (30 TO LAST 30 DAYS)     Last Written Prescription Date:  N/A  Last Fill Quantity: N/A,   # refills: 0  Last Office Visit: 02/22/2021  Future Office visit:       Routing refill request to provider for review/approval because:  Drug not on the [facility], P or [facility] refill protocol or controlled substance    Nicolette Rothman MA

## 2021-03-31 ENCOUNTER — HOME INFUSION (PRE-WILLOW HOME INFUSION) (OUTPATIENT)
Dept: PHARMACY | Facility: CLINIC | Age: 51
End: 2021-03-31

## 2021-04-01 ENCOUNTER — HOME INFUSION (PRE-WILLOW HOME INFUSION) (OUTPATIENT)
Dept: PHARMACY | Facility: CLINIC | Age: 51
End: 2021-04-01

## 2021-04-01 NOTE — PROGRESS NOTES
This is a recent snapshot of the patient's [facility] medical record.  For current drug dose and complete information and questions, call [phone] or In Basket pool, fv home infusion ([id number])  CSN Number:  [id number]

## 2021-04-02 ENCOUNTER — HOME INFUSION (PRE-WILLOW HOME INFUSION) (OUTPATIENT)
Dept: PHARMACY | Facility: CLINIC | Age: 51
End: 2021-04-02

## 2021-04-02 LAB
BASOPHILS # BLD AUTO: 0 10E9/L (ref 0–0.2)
BASOPHILS NFR BLD AUTO: 0.4 %
DIFFERENTIAL METHOD BLD: ABNORMAL
EOSINOPHIL # BLD AUTO: 0.1 10E9/L (ref 0–0.7)
EOSINOPHIL NFR BLD AUTO: 0.7 %
ERYTHROCYTE [DISTWIDTH] IN BLOOD BY AUTOMATED COUNT: 15.8 % (ref 10–15)
HCT VFR BLD AUTO: 38.9 % (ref 40–53)
HGB BLD-MCNC: 12.4 G/DL (ref 13.3–17.7)
IMM GRANULOCYTES # BLD: 0 10E9/L (ref 0–0.4)
IMM GRANULOCYTES NFR BLD: 0.3 %
LYMPHOCYTES # BLD AUTO: 2.2 10E9/L (ref 0.8–5.3)
LYMPHOCYTES NFR BLD AUTO: 22.1 %
MCH RBC QN AUTO: 29.7 PG (ref 26.5–33)
MCHC RBC AUTO-ENTMCNC: 31.9 G/DL (ref 31.5–36.5)
MCV RBC AUTO: 93 FL (ref 78–100)
MONOCYTES # BLD AUTO: 1.1 10E9/L (ref 0–1.3)
MONOCYTES NFR BLD AUTO: 11.3 %
NEUTROPHILS # BLD AUTO: 6.5 10E9/L (ref 1.6–8.3)
NEUTROPHILS NFR BLD AUTO: 65.2 %
NRBC # BLD AUTO: 0 10*3/UL
NRBC BLD AUTO-RTO: 0 /100
PLATELET # BLD AUTO: 249 10E9/L (ref 150–450)
RBC # BLD AUTO: 4.18 10E12/L (ref 4.4–5.9)
WBC # BLD AUTO: 10 10E9/L (ref 4–11)

## 2021-04-02 PROCEDURE — 87040 BLOOD CULTURE FOR BACTERIA: CPT | Performed by: INTERNAL MEDICINE

## 2021-04-02 PROCEDURE — 85025 COMPLETE CBC W/AUTO DIFF WBC: CPT | Performed by: INTERNAL MEDICINE

## 2021-04-02 NOTE — PROGRESS NOTES
This is a recent snapshot of the patient's [facility] medical record.  For current drug dose and complete information and questions, call [phone] or In Basket pool, fv home infusion ([id number])  CSN Number:  [id number]

## 2021-04-05 NOTE — PROGRESS NOTES
This is a recent snapshot of the patient's [facility] medical record.  For current drug dose and complete information and questions, call [phone] or In Basket pool, fv home infusion ([id number])  CSN Number:  [id number]

## 2021-04-08 ENCOUNTER — HOME INFUSION (PRE-WILLOW HOME INFUSION) (OUTPATIENT)
Dept: PHARMACY | Facility: CLINIC | Age: 51
End: 2021-04-08

## 2021-04-08 LAB
BACTERIA SPEC CULT: NO GROWTH
SPECIMEN SOURCE: NORMAL

## 2021-04-09 ENCOUNTER — MYC MEDICAL ADVICE (OUTPATIENT)
Dept: SURGERY | Facility: CLINIC | Age: 51
End: 2021-04-09

## 2021-04-09 NOTE — TELEPHONE ENCOUNTER
Returned call to triage report of black drainage coming from Gtube. Talked to wife, who sent the MyChart. Parker was heard in the background.     Per wife, georgey drains green or yellow, last night was black drainage. Still dark green now. Less drainage than before. No coffee ground looking material noted in gtube. Has had fevers but that's been worked up, cultures. Does take some PO, did not eat anything that would cause color change. No change to stool color, last bm 2 days ago. No other new symptoms.     Encouraged them to monitor output and color change for now. If bright red noted from gtube or from rectum to let us know right way, and to call if there is persistent black drainage coming for gtube. Encouraged them to take photo and attach to message if able to we can assess.    ML

## 2021-04-09 NOTE — PROGRESS NOTES
This is a recent snapshot of the patient's [facility] medical record.  For current drug dose and complete information and questions, call [phone] or In Basket pool, fv home infusion ([id number])  CSN Number:  [id number]

## 2021-04-11 ENCOUNTER — HEALTH MAINTENANCE LETTER (OUTPATIENT)
Age: 51
End: 2021-04-11

## 2021-04-12 ENCOUNTER — MYC MEDICAL ADVICE (OUTPATIENT)
Dept: INFECTIOUS DISEASES | Facility: CLINIC | Age: 51
End: 2021-04-12

## 2021-04-12 NOTE — TELEPHONE ENCOUNTER
Called to follow up on patient dark drainage from gtube bag, photo sent. Per wife drainage back to orange and dark green. Advised to continue to watch and let us know.     ML

## 2021-04-13 ENCOUNTER — HOSPITAL ENCOUNTER (OUTPATIENT)
Dept: LAB | Facility: CLINIC | Age: 51
Discharge: HOME | End: 2021-04-13
Attending: SURGERY | Admitting: SURGERY
Payer: COMMERCIAL

## 2021-04-13 LAB
ALBUMIN SERPL-MCNC: 3.5 G/DL (ref 3.4–5)
ALP SERPL-CCNC: 73 U/L (ref 40–150)
ALT SERPL W P-5'-P-CCNC: 50 U/L (ref 0–70)
ANION GAP SERPL CALCULATED.3IONS-SCNC: 5 MMOL/L (ref 3–14)
AST SERPL W P-5'-P-CCNC: 12 U/L (ref 0–45)
BASOPHILS # BLD AUTO: 0.1 10E9/L (ref 0–0.2)
BASOPHILS NFR BLD AUTO: 0.6 %
BILIRUB DIRECT SERPL-MCNC: 0.1 MG/DL (ref 0–0.2)
BILIRUB SERPL-MCNC: 0.4 MG/DL (ref 0.2–1.3)
BUN SERPL-MCNC: 14 MG/DL (ref 7–30)
CALCIUM SERPL-MCNC: 8 MG/DL (ref 8.5–10.1)
CHLORIDE SERPL-SCNC: 108 MMOL/L (ref 94–109)
CO2 SERPL-SCNC: 28 MMOL/L (ref 20–32)
CREAT SERPL-MCNC: 0.68 MG/DL (ref 0.66–1.25)
DIFFERENTIAL METHOD BLD: ABNORMAL
EOSINOPHIL # BLD AUTO: 0.3 10E9/L (ref 0–0.7)
EOSINOPHIL NFR BLD AUTO: 3.1 %
ERYTHROCYTE [DISTWIDTH] IN BLOOD BY AUTOMATED COUNT: 15.1 % (ref 10–15)
GFR SERPL CREATININE-BSD FRML MDRD: >90 ML/MIN/{1.73_M2}
GLUCOSE SERPL-MCNC: 89 MG/DL (ref 70–99)
HCT VFR BLD AUTO: 36.6 % (ref 40–53)
HGB BLD-MCNC: 12 G/DL (ref 13.3–17.7)
IMM GRANULOCYTES # BLD: 0 10E9/L (ref 0–0.4)
IMM GRANULOCYTES NFR BLD: 0.2 %
LYMPHOCYTES # BLD AUTO: 2.3 10E9/L (ref 0.8–5.3)
LYMPHOCYTES NFR BLD AUTO: 26.3 %
MAGNESIUM SERPL-MCNC: 2 MG/DL (ref 1.6–2.3)
MCH RBC QN AUTO: 31.4 PG (ref 26.5–33)
MCHC RBC AUTO-ENTMCNC: 32.8 G/DL (ref 31.5–36.5)
MCV RBC AUTO: 96 FL (ref 78–100)
MONOCYTES # BLD AUTO: 1.2 10E9/L (ref 0–1.3)
MONOCYTES NFR BLD AUTO: 13.2 %
NEUTROPHILS # BLD AUTO: 4.9 10E9/L (ref 1.6–8.3)
NEUTROPHILS NFR BLD AUTO: 56.6 %
NRBC # BLD AUTO: 0 10*3/UL
NRBC BLD AUTO-RTO: 0 /100
PHOSPHATE SERPL-MCNC: 3.8 MG/DL (ref 2.5–4.5)
PLATELET # BLD AUTO: 154 10E9/L (ref 150–450)
POTASSIUM SERPL-SCNC: 4 MMOL/L (ref 3.4–5.3)
PROT SERPL-MCNC: 6.6 G/DL (ref 6.8–8.8)
RBC # BLD AUTO: 3.82 10E12/L (ref 4.4–5.9)
SODIUM SERPL-SCNC: 141 MMOL/L (ref 133–144)
TRIGL SERPL-MCNC: 71 MG/DL
WBC # BLD AUTO: 8.7 10E9/L (ref 4–11)

## 2021-04-13 PROCEDURE — 80053 COMPREHEN METABOLIC PANEL: CPT | Performed by: SURGERY

## 2021-04-13 PROCEDURE — 84478 ASSAY OF TRIGLYCERIDES: CPT | Performed by: SURGERY

## 2021-04-13 PROCEDURE — 83735 ASSAY OF MAGNESIUM: CPT | Performed by: SURGERY

## 2021-04-13 PROCEDURE — 84100 ASSAY OF PHOSPHORUS: CPT | Performed by: SURGERY

## 2021-04-13 PROCEDURE — 82248 BILIRUBIN DIRECT: CPT | Performed by: SURGERY

## 2021-04-13 PROCEDURE — 85025 COMPLETE CBC W/AUTO DIFF WBC: CPT | Performed by: SURGERY

## 2021-04-13 NOTE — TELEPHONE ENCOUNTER
PRADEEPM on pt's home phone. Relayed to pt and spouse that blood cultures finalized and were negative. Asked him to give us a call in clinic or reply to Mychart msg if he is still having fevers.  Astrid Saucedo RN

## 2021-04-14 ENCOUNTER — HOME INFUSION (PRE-WILLOW HOME INFUSION) (OUTPATIENT)
Dept: PHARMACY | Facility: CLINIC | Age: 51
End: 2021-04-14

## 2021-04-15 ENCOUNTER — TELEPHONE (OUTPATIENT)
Dept: INTERNAL MEDICINE | Facility: CLINIC | Age: 51
End: 2021-04-15

## 2021-04-15 DIAGNOSIS — Z98.84 GASTRIC BYPASS STATUS FOR OBESITY: ICD-10-CM

## 2021-04-15 NOTE — TELEPHONE ENCOUNTER
Reason for Call:  Form, our goal is to have forms completed with 72 hours, however, some forms may require a visit or additional information.    Type of letter, form or note:  Home Health Certification    Who is the form from?: Home care    Where did the form come from: form was faxed in    What clinic location was the form placed at?: [facility]    Where the form was placed: Given to MA/RN    What number is listed as a contact on the form?:        Additional comments:     Call taken on 4/15/2021 at 10:48 AM by Teetee Parada

## 2021-04-15 NOTE — TELEPHONE ENCOUNTER
Reason for call:  Other   Patient called regarding (reason for call): Nurse Care Coordinator, Jael, is calling from [facility]. Stated trupti had to reschedule their dr's office because of the weather and are now have a hard time getting out of the house to the infusion center. The clinic is calling wondering if Dr Vyas would put an order/referal for the patient learning center to teach the spouse how to do dressing changes on the patient's line.  Additional comments: Please call back    Phone number to reach patient:  Other phone number:  [phone]    Best Time:  n/a    Can we leave a detailed message on this number?  Not Applicable     yes

## 2021-04-15 NOTE — PROGRESS NOTES
This is a recent snapshot of the patient's [facility] medical record.  For current drug dose and complete information and questions, call [phone] or In Basket pool, fv home infusion ([id number])  CSN Number:  [id number]

## 2021-04-16 ENCOUNTER — PATIENT OUTREACH (OUTPATIENT)
Dept: FAMILY MEDICINE | Facility: CLINIC | Age: 51
End: 2021-04-16
Payer: COMMERCIAL

## 2021-04-16 RX ORDER — DEXTROAMPHETAMINE SACCHARATE, AMPHETAMINE ASPARTATE, DEXTROAMPHETAMINE SULFATE AND AMPHETAMINE SULFATE 5; 5; 5; 5 MG/1; MG/1; MG/1; MG/1
20 TABLET ORAL DAILY
COMMUNITY
End: 2021-06-18

## 2021-04-16 NOTE — PROGRESS NOTES
"Clinic Care Coordination Contact    Follow Up Progress Note      Assessment: Called and spoke with wife, states he is doing pretty well.  He did have an episode out of his bed that was dark in color and was not running a low-grade fever however, all have seemed to resolve.  States they are still on hold for getting patient's \"Jim\" placed.  They are concerned about the activity to [place] right now I can await and see how after the liver doctors read how [place] responds.  Otherwise no other questions or concerns.    Goals addressed this encounter:         #2 Medical (pt-stated)      Goal Statement: I want to prevent hospital visits  Date Goal set: 9/9/2020   Barriers: on chronic TPN, IV line  Strengths: home infusion, care coordination  Date to Achieve By: 5/9/21  Patient expressed understanding of goal: yes  Action steps to achieve this goal:  1. I will complete IV antibiotics as prescribed. (completed)  2. I will follow up with infectious disease provider as scheduled 9/24/20. (completed)  3. I will stay at home during COVID-19 pandemic.   4. I will monitor my temperature daily as instructed and contact [facility] for elevated temperature parameters.  5. I will have G-tube replaced as scheduled. (completed)        Intervention/Education provided during outreach: Follow-up with any questions or concerns     Outreach Frequency: monthly    Plan:   Care Coordinator will follow up in 1 month      Dianelys KHAN, RN, PHN, CCM  Primary Clinic Care Coordination    [facility]  Primary Care Clinics  [email or web address]   Office: [phone]  Employed by [employer]        "

## 2021-04-19 RX ORDER — SUCRALFATE ORAL 1 G/10ML
SUSPENSION ORAL
Qty: 420 ML | Refills: 1 | Status: SHIPPED | OUTPATIENT
Start: 2021-04-19 | End: 2021-11-15

## 2021-04-20 DIAGNOSIS — Z53.9 DIAGNOSIS NOT YET DEFINED: Primary | ICD-10-CM

## 2021-04-20 PROCEDURE — G0179 MD RECERTIFICATION HHA PT: HCPCS | Performed by: INTERNAL MEDICINE

## 2021-04-21 ENCOUNTER — TELEPHONE (OUTPATIENT)
Dept: INTERNAL MEDICINE | Facility: CLINIC | Age: 51
End: 2021-04-21

## 2021-04-21 NOTE — TELEPHONE ENCOUNTER
Try for pa for Lorazepam liquid 2mg/ml . Ins says not on formulary  PA needed for: Lorazepam Liquid  Insurance: [employer] Part d  Insur phone:[phone]  Patient ID: [id number]  Please let us know when PA is granted/denied. Thank you!  Sharon Fair, Pharmacy Tech, [facility] [place] [phone]

## 2021-04-22 ENCOUNTER — MYC REFILL (OUTPATIENT)
Dept: INTERNAL MEDICINE | Facility: CLINIC | Age: 51
End: 2021-04-22

## 2021-04-22 DIAGNOSIS — F90.0 ADHD (ATTENTION DEFICIT HYPERACTIVITY DISORDER), INATTENTIVE TYPE: ICD-10-CM

## 2021-04-22 NOTE — TELEPHONE ENCOUNTER
Adderall        Last Written Prescription Date:  3/26/2021  Last Fill Quantity: 30,   # refills: 0  Last Office Visit: 2/22/2021  Future Office visit:    Next 5 appointments (look out 90 days)    May 12, 2021 12:45 PM  Return Visit with Patti Milligan MD  [facility] [place] ([facility] [place] ) [address]  [place] [address]  [phone]           Routing refill request to provider for review/approval because:  Drug not on the [facility], [facility] or [facility] refill protocol or controlled substance

## 2021-04-22 NOTE — TELEPHONE ENCOUNTER
Central Prior Authorization Team   Phone: [phone]      PA Initiation    Medication: Lorazepam Liquid  Insurance Company: [facility] - Phone [phone] Fax [phone]  Pharmacy Filling the Rx: [facility] - [place], [place] - [address] [place] [address]  Filling Pharmacy Phone: [phone]  Filling Pharmacy Fax:    Start Date: 4/22/2021

## 2021-04-23 ENCOUNTER — MYC MEDICAL ADVICE (OUTPATIENT)
Dept: INFECTIOUS DISEASES | Facility: CLINIC | Age: 51
End: 2021-04-23

## 2021-04-23 ENCOUNTER — MYC MEDICAL ADVICE (OUTPATIENT)
Dept: SURGERY | Facility: CLINIC | Age: 51
End: 2021-04-23

## 2021-04-23 ENCOUNTER — MYC MEDICAL ADVICE (OUTPATIENT)
Dept: INTERNAL MEDICINE | Facility: CLINIC | Age: 51
End: 2021-04-23

## 2021-04-23 ENCOUNTER — MYC REFILL (OUTPATIENT)
Dept: PALLIATIVE MEDICINE | Facility: CLINIC | Age: 51
End: 2021-04-23

## 2021-04-23 DIAGNOSIS — G89.29 CHRONIC ABDOMINAL PAIN: ICD-10-CM

## 2021-04-23 DIAGNOSIS — R10.9 CHRONIC ABDOMINAL PAIN: ICD-10-CM

## 2021-04-23 DIAGNOSIS — E44.0 MODERATE PROTEIN-CALORIE MALNUTRITION (H): Primary | ICD-10-CM

## 2021-04-23 DIAGNOSIS — F90.0 ADHD (ATTENTION DEFICIT HYPERACTIVITY DISORDER), INATTENTIVE TYPE: ICD-10-CM

## 2021-04-23 DIAGNOSIS — G89.4 CHRONIC PAIN SYNDROME: ICD-10-CM

## 2021-04-23 RX ORDER — DEXTROAMPHETAMINE SACCHARATE, AMPHETAMINE ASPARTATE, DEXTROAMPHETAMINE SULFATE AND AMPHETAMINE SULFATE 5; 5; 5; 5 MG/1; MG/1; MG/1; MG/1
TABLET ORAL
Qty: 30 TABLET | Refills: 0 | Status: SHIPPED | OUTPATIENT
Start: 2021-04-23 | End: 2021-05-24

## 2021-04-23 RX ORDER — LORAZEPAM 1 MG/1
TABLET ORAL
Qty: 30 TABLET | Refills: 0 | Status: CANCELLED | OUTPATIENT
Start: 2021-04-23

## 2021-04-24 ENCOUNTER — TELEPHONE (OUTPATIENT)
Dept: INTERNAL MEDICINE | Facility: CLINIC | Age: 51
End: 2021-04-24

## 2021-04-24 DIAGNOSIS — F90.0 ADHD (ATTENTION DEFICIT HYPERACTIVITY DISORDER), INATTENTIVE TYPE: ICD-10-CM

## 2021-04-24 NOTE — TELEPHONE ENCOUNTER
Reason for call:  Other   Patient called regarding (reason for call): prescription  Additional comments: Patient is calling because he was prescribed the liquid version of Ativan and would like to change to the pill form. Patient states insurance does not cover the liquid version, and it doesn't agree with his stomach.    Phone number to reach patient:  Home number on file [phone] (home)    Best Time:  Any time    Can we leave a detailed message on this number?  YES    Travel screening: Not Applicable

## 2021-04-26 ENCOUNTER — TELEPHONE (OUTPATIENT)
Dept: INTERVENTIONAL RADIOLOGY/VASCULAR | Facility: CLINIC | Age: 51
End: 2021-04-26

## 2021-04-26 ENCOUNTER — TELEPHONE (OUTPATIENT)
Dept: SURGERY | Facility: CLINIC | Age: 51
End: 2021-04-26

## 2021-04-26 ENCOUNTER — HOME INFUSION (PRE-WILLOW HOME INFUSION) (OUTPATIENT)
Dept: PHARMACY | Facility: CLINIC | Age: 51
End: 2021-04-26

## 2021-04-26 DIAGNOSIS — Z11.59 ENCOUNTER FOR SCREENING FOR OTHER VIRAL DISEASES: ICD-10-CM

## 2021-04-26 DIAGNOSIS — E44.0 MODERATE PROTEIN-CALORIE MALNUTRITION (H): Primary | ICD-10-CM

## 2021-04-26 RX ORDER — LORAZEPAM 1 MG/1
TABLET ORAL
Qty: 30 TABLET | Refills: 0 | Status: SHIPPED | OUTPATIENT
Start: 2021-04-26 | End: 2021-05-21

## 2021-04-26 NOTE — TELEPHONE ENCOUNTER
Prior Authorization Approval    Authorization Effective Date:    Authorization Expiration Date:    Medication: Lorazepam Liquid  Approved Dose/Quantity:    Reference #:     Insurance Company: [employer] - Phone [phone] Fax [phone]  Expected CoPay:       CoPay Card Available:      Foundation Assistance Needed:    Which Pharmacy is filling the prescription (Not needed for infusion/clinic administered): [facility] [place] [address] [place] [address]  Pharmacy Notified: Yes  Patient Notified: Yes  **Instructed pharmacy to notify patient when script is ready to /ship.**

## 2021-04-26 NOTE — TELEPHONE ENCOUNTER
Patient requesting refill(s) of oxyCODONE (ROXICODONE) 5 MG/5ML solution   Last dispensed from pharmacy on 4/7/2021    fentaNYL (DURAGESIC) 25 mcg/hr 72 hr patch  Last dispensed from pharmacy on 4/7/2021    Patient's last office/virtual visit by prescribing provider on 1/29/2021   Next office/virtual appointment scheduled for 5/12/2021    Last urine drug screen date 11/06/2020  Current opioid agreement on file (completed within the last year) Yes Date of opioid agreement: 1/5/2021    E-prescribe to [facility] [place], [place] pharmacy    Will route to nursing [place] for review and preparation of prescription(s).

## 2021-04-26 NOTE — TELEPHONE ENCOUNTER
Medication refill information reviewed.     Due date for     oxyCODONE (ROXICODONE) 5 MG/5ML solution   fentaNYL (DURAGESIC) 25 mcg/hr 72 hr patch is  05/09/21     Prescriptions prepped for review.     Will route to provider.

## 2021-04-26 NOTE — TELEPHONE ENCOUNTER
Wife states he flushed his tube and turned to change the dressings and states the line looks like it blew.  States she did not have the line clamped and states the line flushed fine.  She clamped the line and covered the area with a bandage.    Patient has a Cm catheter currently. Patient prefers the the polyurethane catheter and not silicone.    Order placed.

## 2021-04-26 NOTE — TELEPHONE ENCOUNTER
[facility] called and would like a call back regarding pts central line needing to be removed    [phone]

## 2021-04-26 NOTE — TELEPHONE ENCOUNTER
[facility] Call Center    Phone Message    May a detailed message be left on voicemail: no     Reason for Call: Other: `    Rika from the Bariatric department called for assistance in sending a Referral for a Cm Catheter for this Pt.    No reply at backline at time of call.    Please return call to Rika so she can submit Pt's IR referral.    Action Taken: Message routed to:  [facility] Intervent. Radiology    Travel Screening: Not Applicable

## 2021-04-26 NOTE — TELEPHONE ENCOUNTER
Refills have been requested for the following medications:         oxyCODONE (ROXICODONE) 5 MG/5ML solution [Patti Milligan MD]         fentaNYL (DURAGESIC) 25 mcg/hr 72 hr patch [Patti Milligan MD]     Preferred pharmacy: [address]

## 2021-04-26 NOTE — TELEPHONE ENCOUNTER
Returned call to Daniel JOHNSON    Informed her that I do see the procedure scheduled so therefore a referral may not be necessary anymore.     However if there are any questions, then she can call me back.     Diane RODARTE RN, BSN  Interventional Radiology/Vascular  Nurse Coordinator   Phone: [phone]  Fax: [phone]

## 2021-04-27 ENCOUNTER — HOME INFUSION (PRE-WILLOW HOME INFUSION) (OUTPATIENT)
Dept: PHARMACY | Facility: CLINIC | Age: 51
End: 2021-04-27

## 2021-04-27 RX ORDER — OXYCODONE HCL 5 MG/5 ML
5-10 SOLUTION, ORAL ORAL 3 TIMES DAILY PRN
Qty: 900 ML | Refills: 0 | Status: SHIPPED | OUTPATIENT
Start: 2021-04-27 | End: 2021-05-27

## 2021-04-27 RX ORDER — FENTANYL 25 UG/1
1 PATCH TRANSDERMAL
Qty: 15 PATCH | Refills: 0 | Status: SHIPPED | OUTPATIENT
Start: 2021-04-27 | End: 2021-05-27

## 2021-04-27 NOTE — PROGRESS NOTES
This is a recent snapshot of the patient's [facility] medical record.  For current drug dose and complete information and questions, call [phone] or In Basket pool, fv home infusion ([id number])  CSN Number:  [id number]

## 2021-04-27 NOTE — TELEPHONE ENCOUNTER
Script Eprescribed to pharmacy  [place] Prescription Monitoring [place] checked    Will ask nursing to call patient.  I see his lorazepam dosing has gone up recently, and this is something we weaned down on the past due to interaction with his opioids.  I will be reaching out to PCP, but also wondering if you can find out from patient why his dose has gone up, and if he feels this needs to remain at this higher dose, because we may need to adjust pain meds.  Are other treatments being tried for anxiety?    Signed Prescriptions:                        Disp   Refills    oxyCODONE (ROXICODONE) 5 MG/5ML solution   900 mL 0        Sig: Take 5-10 mLs (5-10 mg) by mouth 3 times daily as           needed for pain Max of 30mg/day. Put at least 4           hours between doses. Fill 05/07/21 to start           on/after 05/09/21. 30 day supply.  Authorizing Provider: BRANDYN JENKINS    fentaNYL (DURAGESIC) 25 mcg/hr 72 hr patch 15 pat*0        Sig: Place 1 patch onto the skin every 48 hours remove old           patch.  May fill 05/07/21 and start 05/09/21  Authorizing Provider: BRANDYN JENKINS MD  [facility]

## 2021-04-27 NOTE — TELEPHONE ENCOUNTER
Communicated with PCP, who said it was decreased for nausea, but they will work on getting it down.    I think we still need to remind Parker to communicate with his PCP and the goal should be to wean down on that medicine (not stop cold turkey)    Jessica Milligan MD  [facility]

## 2021-04-27 NOTE — TELEPHONE ENCOUNTER
"IR referral placed 4/26/21 for patient to have line replaced.  Attempted to call the number listed and was given a message of \"this number has not been assigned.\" Unable to leave a message.  "

## 2021-04-28 ENCOUNTER — TELEPHONE (OUTPATIENT)
Dept: NURSING | Facility: CLINIC | Age: 51
End: 2021-04-28

## 2021-04-28 ENCOUNTER — PATIENT OUTREACH (OUTPATIENT)
Dept: GASTROENTEROLOGY | Facility: CLINIC | Age: 51
End: 2021-04-28

## 2021-04-28 ENCOUNTER — HOME INFUSION (PRE-WILLOW HOME INFUSION) (OUTPATIENT)
Dept: PHARMACY | Facility: CLINIC | Age: 51
End: 2021-04-28

## 2021-04-28 NOTE — PROGRESS NOTES
This is a recent snapshot of the patient's [facility] medical record.  For current drug dose and complete information and questions, call [phone] or In Basket pool, fv home infusion ([id number])  CSN Number:  [id number]

## 2021-04-28 NOTE — TELEPHONE ENCOUNTER
The most recent script was the same, but the one before that on 3/26 was a higher concentration.    Sounds good. Thanks for update.    Jessica Milligan MD  [facility]

## 2021-04-28 NOTE — TELEPHONE ENCOUNTER
Spoke with patients wife and patient and they report he was prescribed some liquid ativan that he never used. He tried it once and it made him very sick. They are going to return that to the doctor for disposal. He is back on the Ativan 1 MG #1 tab nightly with TPN. They report this has always been the dose he has been using and nothing has changed with that. Please review.    YADIRA LazarN, RN  Care Coordinator  [facility] [place]

## 2021-04-28 NOTE — TELEPHONE ENCOUNTER
Per home infusion RN, Toi is ripped, unable to use for for TPN managed by Dr Vyas. [facility] triage forwarded them to me as member of GI clinic.     Fifi from [facility] was transferred from [facility] triage. Line is ruptured on the end, send to ED to have it replaced/removed. Patient has been non adherent to POC, stopping bag before done, she's wondering about safety and POC. Pt has had multiple line infections, torn/cut lines. Would like call back at [phone] to discuss, also asking that Rika also tell him to go in for his line removal, pt wants to wait until 5/7 to remove, which is not safe.     Message sent to Rika Lowe, GERRI    ML

## 2021-04-28 NOTE — TELEPHONE ENCOUNTER
.  [facility]: Nurse Triage Note  SITUATION/BACKGROUND                                                      Parker Acevedo is a 50 year old male whose Home Infusion nurse, Estelle calls to report patient's Cm catheter is ripped in half unable to use for TPN and/or hydration. Nurse would like to speak with Care team to discuss Plan of Care. Her contact number [phone].     This nurse contacted Gastroenterology and warm transfer given to Astrid Wlaler (RN) for further assistance reaching Rika, the RN for Dr. Vyas.

## 2021-04-29 ENCOUNTER — HOME INFUSION (PRE-WILLOW HOME INFUSION) (OUTPATIENT)
Dept: PHARMACY | Facility: CLINIC | Age: 51
End: 2021-04-29

## 2021-04-29 ENCOUNTER — TELEPHONE (OUTPATIENT)
Dept: ENDOCRINOLOGY | Facility: CLINIC | Age: 51
End: 2021-04-29

## 2021-04-29 ENCOUNTER — MYC MEDICAL ADVICE (OUTPATIENT)
Dept: INTERVENTIONAL RADIOLOGY/VASCULAR | Facility: CLINIC | Age: 51
End: 2021-04-29

## 2021-04-29 ENCOUNTER — DOCUMENTATION ONLY (OUTPATIENT)
Facility: CLINIC | Age: 51
End: 2021-04-29

## 2021-04-29 ENCOUNTER — CARE COORDINATION (OUTPATIENT)
Dept: ENDOCRINOLOGY | Facility: CLINIC | Age: 51
End: 2021-04-29

## 2021-04-29 ENCOUNTER — TELEPHONE (OUTPATIENT)
Dept: INTERNAL MEDICINE | Facility: CLINIC | Age: 51
End: 2021-04-29

## 2021-04-29 NOTE — PROGRESS NOTES
This is a recent snapshot of the patient's [facility] medical record.  For current drug dose and complete information and questions, call [phone] or In Basket pool, fv home infusion ([id number])  CSN Number:  [id number]

## 2021-04-29 NOTE — PROGRESS NOTES
Phone call to discuss patient status.    Patient's hickmann is currently ripped and not able to use.  Concerned that patient is okay with waiting until May 7 to have it replaced.  Outlined several concerns regarding patient noncompliance with ordered care.  Notifying office that they will be setting up a contract with the patient for adherence and will notify office if patient continues to be noncompliant.  Dr. Vyas notified.

## 2021-04-29 NOTE — TELEPHONE ENCOUNTER
Talked with wife. Patient is supposed to go for a Covid test today and procedure can be done tomorrow.  Patient argumentative in the background stating his last one took 3 weeks to get and he wants to wait until May 7th when it was originally scheduled.      Wife encouraged for patient to have procedure tomorrow as the current state of the catheter poses a risk to the patient even if it is clamped.  She will update the office if they are able to have the procedure tomorrow.

## 2021-04-29 NOTE — TELEPHONE ENCOUNTER
Taylor from [facility] called and wanted to give you an FYI..  His midline broke and he is getting a new one 5/7 unless they can work him in sooner.  You don't have to call unless you have questions.

## 2021-04-29 NOTE — PROGRESS NOTES
IR follow-up note.    We were updated by pt 4/26 that TCVC had been cut and needed to be exchanged. Urgent appt for this week was offered and patient refused. Scheduled for 5/7 at his request. On-call provider received a call from service that provides TPN for the patient last evening and stated the patient needed to be seen sooner for his safety (infection, etc). They asked us to reach out to the patient.    IR called pt this AM. Offered 4/30 appt for TCVC exchange and COVID test near home today. Patient refused to come. Endorses understanding of risks and refuses to be see sooner.    See RN note from GI with similar discussion.    Patti Garvey, DNP, APRN  Interventional Radiology

## 2021-04-30 ENCOUNTER — TELEPHONE (OUTPATIENT)
Dept: INTERNAL MEDICINE | Facility: CLINIC | Age: 51
End: 2021-04-30

## 2021-04-30 ENCOUNTER — MYC MEDICAL ADVICE (OUTPATIENT)
Dept: PALLIATIVE MEDICINE | Facility: CLINIC | Age: 51
End: 2021-04-30

## 2021-04-30 ENCOUNTER — MYC MEDICAL ADVICE (OUTPATIENT)
Dept: INTERNAL MEDICINE | Facility: CLINIC | Age: 51
End: 2021-04-30

## 2021-04-30 NOTE — TELEPHONE ENCOUNTER
Left message for call back. Please relay message below from Dr. Isaac.  Taylor Bustos [facility]

## 2021-04-30 NOTE — TELEPHONE ENCOUNTER
Please let him know we heard about his midline hopefully can review replaced next week.    Also let him know we need to cut his Ativan back to 0.5 mg as the pain clinic was concerned about that increase with his other medication use.  He can reduce the amount of liquid used and will reduce it next prescription.

## 2021-04-30 NOTE — TELEPHONE ENCOUNTER
"Genesis cohen RN from [facility] is calling and requesting an order for a peripheral IV line to be placed for patient prior to replacement of Hickmann that \"blew out\" and will be surgically replaced May 7, 2021.  She stated patient's wife will be giving fluids through this IV as needed.    Huddled with Dr. Bessy RICHARD, who stated he will sign an order for peripheral IV to be placed by home care RN at this time.    Will phone Genesis, the RN from [facility] regarding this information.      Ella Hammond, RN      "

## 2021-04-30 NOTE — TELEPHONE ENCOUNTER
Please consider this an order for the home health care nurse to place an IV at this time.    I do not believe that I have provisions for automated order for out of system home health care provider.      Thank you.    Bessy

## 2021-04-30 NOTE — TELEPHONE ENCOUNTER
Called and spoke with Rose.  He was going between the pill and liquid version of lorazepam due to diarrhea, but she states the dose was not changed from 1mg/day.    Reviewed [place] Prescription Monitoring Program with her, Feb appeared to be enough for 2mg/day, but more recent are 1mg/day.    I am ok with them sticking that this stable dose of 1mg/day and they are aware or risks as we have discussed this many times in the past.    I let them know I would send this to Dr. Isaac to he knows my concern about the increased dose was around that Feb script, but I am ok with him being on stable dose of lorazepam 1mg/day.    advised they don't need to cut down to 0.5mg/day and apologized for confusion.    Jessica Milligan MD  [facility]

## 2021-04-30 NOTE — TELEPHONE ENCOUNTER
From: Parker Acevedo      Created: 4/30/2021 1:55 PM        Just checking to see why I need to cut my ativant pills in half when I've been at the same dose the .05 mg in liquid = 1mg so I've been taking the 1mg for the last 2 years  but if you have any further questions feel free to call me thank you Parker Acevedo

## 2021-05-01 ENCOUNTER — APPOINTMENT (OUTPATIENT)
Dept: LAB | Facility: CLINIC | Age: 51
End: 2021-05-01
Attending: INTERNAL MEDICINE
Payer: COMMERCIAL

## 2021-05-03 ENCOUNTER — HOME INFUSION (PRE-WILLOW HOME INFUSION) (OUTPATIENT)
Dept: PHARMACY | Facility: CLINIC | Age: 51
End: 2021-05-03

## 2021-05-03 DIAGNOSIS — Z98.84 BARIATRIC SURGERY STATUS: ICD-10-CM

## 2021-05-03 DIAGNOSIS — R11.0 NAUSEA: ICD-10-CM

## 2021-05-03 NOTE — TELEPHONE ENCOUNTER
Called and LM for CHRISTY Hurtado to call back. Please relay note below from Dr. Doherty.     Nicolette Rothman MA

## 2021-05-04 NOTE — PROGRESS NOTES
This is a recent snapshot of the patient's [facility] medical record.  For current drug dose and complete information and questions, call [phone] or In Basket pool, fv home infusion ([id number])  CSN Number:  [id number]

## 2021-05-04 NOTE — TELEPHONE ENCOUNTER
Routing refill request to provider for review/approval because:  Drug not active on patient's medication list (Syringe)    YADIRA PortilloN, RN  [facility]

## 2021-05-05 DIAGNOSIS — Z11.59 ENCOUNTER FOR SCREENING FOR OTHER VIRAL DISEASES: ICD-10-CM

## 2021-05-05 LAB
LABORATORY COMMENT REPORT: NORMAL
SARS-COV-2 RNA RESP QL NAA+PROBE: NEGATIVE
SARS-COV-2 RNA RESP QL NAA+PROBE: NORMAL
SPECIMEN SOURCE: NORMAL
SPECIMEN SOURCE: NORMAL

## 2021-05-05 PROCEDURE — U0003 INFECTIOUS AGENT DETECTION BY NUCLEIC ACID (DNA OR RNA); SEVERE ACUTE RESPIRATORY SYNDROME CORONAVIRUS 2 (SARS-COV-2) (CORONAVIRUS DISEASE [COVID-19]), AMPLIFIED PROBE TECHNIQUE, MAKING USE OF HIGH THROUGHPUT TECHNOLOGIES AS DESCRIBED BY CMS-2020-01-R: HCPCS | Performed by: PHYSICIAN ASSISTANT

## 2021-05-05 PROCEDURE — U0005 INFEC AGEN DETEC AMPLI PROBE: HCPCS | Performed by: PHYSICIAN ASSISTANT

## 2021-05-05 RX ORDER — ONDANSETRON 8 MG/1
TABLET, ORALLY DISINTEGRATING ORAL
Qty: 90 TABLET | Refills: 1 | Status: SHIPPED | OUTPATIENT
Start: 2021-05-05 | End: 2021-10-26

## 2021-05-05 RX ORDER — IBUPROFEN 200 MG
TABLET ORAL
Qty: 3 EACH | Refills: 4 | Status: SHIPPED | OUTPATIENT
Start: 2021-05-05 | End: 2023-05-25

## 2021-05-05 NOTE — TELEPHONE ENCOUNTER
Genesis was notified.   She stated as of now there is nothing placed as he is refusing care and was told to go to the ER for dehydration, but he refused, they now have [facility] as he is refusing the medical care he needs.  Aissatou Smith MA 5/5/2021

## 2021-05-07 ENCOUNTER — HOSPITAL ENCOUNTER (OUTPATIENT)
Facility: CLINIC | Age: 51
Discharge: HOME | End: 2021-05-07
Attending: RADIOLOGY | Admitting: PHYSICIAN ASSISTANT
Payer: COMMERCIAL

## 2021-05-07 ENCOUNTER — APPOINTMENT (OUTPATIENT)
Dept: INTERVENTIONAL RADIOLOGY/VASCULAR | Facility: CLINIC | Age: 51
End: 2021-05-07
Attending: PHYSICIAN ASSISTANT
Payer: COMMERCIAL

## 2021-05-07 ENCOUNTER — HOME INFUSION (PRE-WILLOW HOME INFUSION) (OUTPATIENT)
Dept: PHARMACY | Facility: CLINIC | Age: 51
End: 2021-05-07

## 2021-05-07 ENCOUNTER — APPOINTMENT (OUTPATIENT)
Dept: MEDSURG UNIT | Facility: CLINIC | Age: 51
End: 2021-05-07
Attending: RADIOLOGY
Payer: COMMERCIAL

## 2021-05-07 VITALS
HEART RATE: 62 BPM | SYSTOLIC BLOOD PRESSURE: 129 MMHG | WEIGHT: 175 LBS | RESPIRATION RATE: 18 BRPM | TEMPERATURE: 98.1 F | DIASTOLIC BLOOD PRESSURE: 75 MMHG | HEIGHT: 71 IN | OXYGEN SATURATION: 100 % | BODY MASS INDEX: 24.5 KG/M2

## 2021-05-07 DIAGNOSIS — E44.0 MODERATE PROTEIN-CALORIE MALNUTRITION (H): ICD-10-CM

## 2021-05-07 LAB — INR PPP: 1.07 (ref 0.86–1.14)

## 2021-05-07 PROCEDURE — 250N000011 HC RX IP 250 OP 636: Performed by: STUDENT IN AN ORGANIZED HEALTH CARE EDUCATION/TRAINING PROGRAM

## 2021-05-07 PROCEDURE — C1751 CATH, INF, PER/CENT/MIDLINE: HCPCS

## 2021-05-07 PROCEDURE — 77001 FLUOROGUIDE FOR VEIN DEVICE: CPT

## 2021-05-07 PROCEDURE — 250N000011 HC RX IP 250 OP 636: Performed by: RADIOLOGY

## 2021-05-07 PROCEDURE — 36581 REPLACE TUNNELED CV CATH: CPT | Mod: RT | Performed by: PHYSICIAN ASSISTANT

## 2021-05-07 PROCEDURE — 999N000132 HC STATISTIC PP CARE STAGE 1

## 2021-05-07 PROCEDURE — 76937 US GUIDE VASCULAR ACCESS: CPT

## 2021-05-07 PROCEDURE — 250N000009 HC RX 250: Performed by: STUDENT IN AN ORGANIZED HEALTH CARE EDUCATION/TRAINING PROGRAM

## 2021-05-07 PROCEDURE — C1887 CATHETER, GUIDING: HCPCS

## 2021-05-07 PROCEDURE — 99153 MOD SED SAME PHYS/QHP EA: CPT

## 2021-05-07 PROCEDURE — 85610 PROTHROMBIN TIME: CPT | Performed by: NURSE PRACTITIONER

## 2021-05-07 PROCEDURE — 999N000127 HC STATISTIC PERIPHERAL IV START W US GUIDANCE

## 2021-05-07 PROCEDURE — 272N000192 HC ACCESSORY CR2

## 2021-05-07 PROCEDURE — 77001 FLUOROGUIDE FOR VEIN DEVICE: CPT | Mod: 26 | Performed by: PHYSICIAN ASSISTANT

## 2021-05-07 PROCEDURE — 250N000009 HC RX 250: Performed by: NURSE PRACTITIONER

## 2021-05-07 PROCEDURE — 76937 US GUIDE VASCULAR ACCESS: CPT | Mod: 26 | Performed by: PHYSICIAN ASSISTANT

## 2021-05-07 PROCEDURE — 99152 MOD SED SAME PHYS/QHP 5/>YRS: CPT

## 2021-05-07 PROCEDURE — 258N000003 HC RX IP 258 OP 636: Performed by: NURSE PRACTITIONER

## 2021-05-07 PROCEDURE — C1769 GUIDE WIRE: HCPCS

## 2021-05-07 PROCEDURE — 272N000504 HC NEEDLE CR4

## 2021-05-07 PROCEDURE — 272N000602 HC WOUND GLUE CR1

## 2021-05-07 PROCEDURE — 99152 MOD SED SAME PHYS/QHP 5/>YRS: CPT | Performed by: PHYSICIAN ASSISTANT

## 2021-05-07 PROCEDURE — 36589 REMOVAL TUNNELED CV CATH: CPT | Mod: XS

## 2021-05-07 RX ORDER — CLINDAMYCIN PHOSPHATE 900 MG/50ML
900 INJECTION, SOLUTION INTRAVENOUS
Status: COMPLETED | OUTPATIENT
Start: 2021-05-07 | End: 2021-05-07

## 2021-05-07 RX ORDER — NALOXONE HYDROCHLORIDE 0.4 MG/ML
0.2 INJECTION, SOLUTION INTRAMUSCULAR; INTRAVENOUS; SUBCUTANEOUS
Status: DISCONTINUED | OUTPATIENT
Start: 2021-05-07 | End: 2021-05-07 | Stop reason: HOSPADM

## 2021-05-07 RX ORDER — HEPARIN SODIUM,PORCINE 10 UNIT/ML
5 VIAL (ML) INTRAVENOUS EVERY 24 HOURS
Status: DISCONTINUED | OUTPATIENT
Start: 2021-05-07 | End: 2021-05-07 | Stop reason: HOSPADM

## 2021-05-07 RX ORDER — HEPARIN SODIUM (PORCINE) LOCK FLUSH IV SOLN 100 UNIT/ML 100 UNIT/ML
5 SOLUTION INTRAVENOUS
Status: COMPLETED | OUTPATIENT
Start: 2021-05-07 | End: 2021-05-07

## 2021-05-07 RX ORDER — NALOXONE HYDROCHLORIDE 0.4 MG/ML
0.4 INJECTION, SOLUTION INTRAMUSCULAR; INTRAVENOUS; SUBCUTANEOUS
Status: DISCONTINUED | OUTPATIENT
Start: 2021-05-07 | End: 2021-05-07 | Stop reason: HOSPADM

## 2021-05-07 RX ORDER — LIDOCAINE 40 MG/G
CREAM TOPICAL
Status: DISCONTINUED | OUTPATIENT
Start: 2021-05-07 | End: 2021-05-07 | Stop reason: HOSPADM

## 2021-05-07 RX ORDER — FLUMAZENIL 0.1 MG/ML
0.2 INJECTION, SOLUTION INTRAVENOUS
Status: DISCONTINUED | OUTPATIENT
Start: 2021-05-07 | End: 2021-05-07 | Stop reason: HOSPADM

## 2021-05-07 RX ORDER — HEPARIN SODIUM,PORCINE 10 UNIT/ML
5-10 VIAL (ML) INTRAVENOUS
Status: DISCONTINUED | OUTPATIENT
Start: 2021-05-07 | End: 2021-05-07 | Stop reason: HOSPADM

## 2021-05-07 RX ORDER — FENTANYL CITRATE 50 UG/ML
25-50 INJECTION, SOLUTION INTRAMUSCULAR; INTRAVENOUS EVERY 5 MIN PRN
Status: DISCONTINUED | OUTPATIENT
Start: 2021-05-07 | End: 2021-05-07 | Stop reason: HOSPADM

## 2021-05-07 RX ORDER — SODIUM CHLORIDE 9 MG/ML
INJECTION, SOLUTION INTRAVENOUS CONTINUOUS
Status: DISCONTINUED | OUTPATIENT
Start: 2021-05-07 | End: 2021-05-07 | Stop reason: HOSPADM

## 2021-05-07 RX ADMIN — FENTANYL CITRATE 50 MCG: 50 INJECTION, SOLUTION INTRAMUSCULAR; INTRAVENOUS at 13:15

## 2021-05-07 RX ADMIN — FENTANYL CITRATE 50 MCG: 50 INJECTION, SOLUTION INTRAMUSCULAR; INTRAVENOUS at 13:06

## 2021-05-07 RX ADMIN — FENTANYL CITRATE 50 MCG: 50 INJECTION, SOLUTION INTRAMUSCULAR; INTRAVENOUS at 12:58

## 2021-05-07 RX ADMIN — SODIUM CHLORIDE: 9 INJECTION, SOLUTION INTRAVENOUS at 11:30

## 2021-05-07 RX ADMIN — MIDAZOLAM 1 MG: 1 INJECTION INTRAMUSCULAR; INTRAVENOUS at 12:48

## 2021-05-07 RX ADMIN — FENTANYL CITRATE 50 MCG: 50 INJECTION, SOLUTION INTRAMUSCULAR; INTRAVENOUS at 12:48

## 2021-05-07 RX ADMIN — LIDOCAINE HYDROCHLORIDE 15 ML: 10 INJECTION, SOLUTION EPIDURAL; INFILTRATION; INTRACAUDAL; PERINEURAL at 13:00

## 2021-05-07 RX ADMIN — CLINDAMYCIN PHOSPHATE 900 MG: 900 INJECTION, SOLUTION INTRAVENOUS at 10:49

## 2021-05-07 RX ADMIN — MIDAZOLAM 1 MG: 1 INJECTION INTRAMUSCULAR; INTRAVENOUS at 12:58

## 2021-05-07 RX ADMIN — Medication 5 ML: at 13:19

## 2021-05-07 RX ADMIN — SODIUM CHLORIDE 1000 ML: 9 INJECTION, SOLUTION INTRAVENOUS at 13:02

## 2021-05-07 RX ADMIN — MIDAZOLAM 1 MG: 1 INJECTION INTRAMUSCULAR; INTRAVENOUS at 13:06

## 2021-05-07 RX ADMIN — MIDAZOLAM 1 MG: 1 INJECTION INTRAMUSCULAR; INTRAVENOUS at 13:14

## 2021-05-07 ASSESSMENT — MIFFLIN-ST. JEOR: SCORE: 1685.92

## 2021-05-07 NOTE — PROGRESS NOTES
Patient Name: Parker Acevedo  Medical Record Number: [id number]  Today's Date: 5/7/2021    Procedure: Tunneled Central Venous Catheter Replacement  Proceduralist: Karla Jeter PA-C    Procedure Start: 1240  Procedure end: 1330  Sedation medications administered: Fentanyl 200 mcg, Versed 4 mg    Report given to: CHRISTY Singh 2A  : n/a    Other Notes: Pt arrived to IR room #2 from Unit 2A. Consent reviewed. Pt denies any questions or concerns regarding procedure. Pt positioned supine and monitored per protocol. 9.5 fr Power Cm placed in right internal jugular vein, confirmed placement of tip in right atrium. Pt tolerated procedure without any noted complications. Pt transferred back to Unit 2A.

## 2021-05-07 NOTE — PRE-PROCEDURE
GENERAL PRE-PROCEDURE:   Procedure:  Tunneled right CVC Replacement  Date/Time:  5/7/2021 10:30 AM    Verbal consent obtained?: Yes    Written consent obtained?: Yes    Risks and benefits: Risks, benefits and alternatives were discussed    Consent given by:  Patient  Patient states understanding of procedure being performed: Yes    Patient's understanding of procedure matches consent: Yes    Procedure consent matches procedure scheduled: Yes    Expected level of sedation:  Moderate  Appropriately NPO:  Yes  ASA Class:  Class 2- mild systemic disease, no acute problems, no functional limitations  Mallampati  :  Grade 1- soft palate, uvula, tonsillar pillars, and posterior pharyngeal wall visible  Lungs:  Lungs clear with good breath sounds bilaterally  Heart:  Normal heart sounds and rate  History & Physical reviewed:  History and physical reviewed and no updates needed  Statement of review:  I have reviewed the lab findings, diagnostic data, medications, and the plan for sedation

## 2021-05-07 NOTE — PROGRESS NOTES
Arrived from home for a tunneled line replacement.  VSS.  Denies pain.  PIV placed by vascular access.  INR sent.  H&P current.  Consent obtained.  Will be ready for procedure when INR is resulted.

## 2021-05-07 NOTE — PROGRESS NOTES
1355 Pt tolerated oral intake. Discharge instructions reviewed, copy given to pt. PIV infiltrated during procedure, pt given warm packs to apply to area and encouraged pt to monitor site closely. PIV dc'd. Pt tolerated ambulation. Pt dc'd home accompanied by wife.

## 2021-05-07 NOTE — PROGRESS NOTES
This is a recent snapshot of the patient's [facility] medical record.  For current drug dose and complete information and questions, call [phone] or In Basket pool, fv home infusion ([id number])  CSN Number:  [id number]

## 2021-05-07 NOTE — IP AVS SNAPSHOT
After Visit Summary Template Not Found    This Print Group is only intended to be used in the After Visit Summary and can only be used in a report that uses a released After Visit Summary Template.                       MRN:[id number]                      After Visit Summary   5/7/2021    Parker Acevedo    MRN: [id number]           Visit Information        Department      5/7/2021  9:39 AM [facility] Interventional Radiology          Review of your medicines      UNREVIEWED medicines. Ask your doctor about these medicines       Dose / Directions   acetaminophen 32 mg/mL liquid  Commonly known as: TYLENOL  Used for: S/P bariatric surgery      Dose: 500 mg  Take 15.65 mLs (500 mg) by mouth every 4 hours as needed for fever or mild pain  Quantity: 455 mL  Refills: 1     alteplase 2 mg/2 mL Soln injection  Commonly known as: ACTIVASE      Dose: 1 mg  Inject 1 mg into the vein as needed  Refills: 0     * amphetamine-dextroamphetamine 20 MG tablet  Commonly known as: ADDERALL      Dose: 20 mg  Take 20 mg by mouth daily  Refills: 0     * amphetamine-dextroamphetamine 20 MG tablet  Commonly known as: Adderall  Used for: ADHD (attention deficit hyperactivity disorder), inattentive type      TAKE ONE TABLET BY MOUTH ONCE DAILY  Quantity: 30 tablet  Refills: 0     carvedilol 6.25 MG tablet  Commonly known as: COREG      Dose: 6.25 mg  Take 6.25 mg by mouth 2 times daily (with meals)  Refills: 0     cyanocobalamin 1000 MCG/ML injection  Commonly known as: CYANOCOBALAMIN  Used for: Vitamin B12 deficiency (non anemic)      Dose: 1 mL  Inject 1 mL (1,000 mcg) into the muscle every 30 days  Quantity: 1 mL  Refills: 11     diphenhydrAMINE 12.5 MG/5ML syrup  Commonly known as: BENADRYL  Used for: Insomnia, unspecified type      Dose: 25 mg  Take 25 mg by mouth every 4 hours as needed for itching, allergies or sleep  Quantity: 237 mL  Refills: 0     EPINEPHrine 0.3 MG/0.3ML injection 2-pack  Commonly known as: ANY BX  "GENERIC EQUIV      Refills: 0     [facility] PATIENT  Used for: S/P bariatric surgery      Contact [facility] for patient specific medication information at [phone] on admission and discharge from the hospital.  Phones are answered 24 hours a day 7 days a week 365 days a year.    Providers - Choose \"CONTINUE HOME MED (no script)\" at discharge if patient treatment with home infusion will continue.  Refills: 0     fentaNYL 25 mcg/hr 72 hr patch  Commonly known as: DURAGESIC  Used for: Chronic abdominal pain, Chronic pain syndrome      Dose: 1 patch  Place 1 patch onto the skin every 48 hours remove old patch.  May fill 05/07/21 and start 05/09/21  Quantity: 15 patch  Refills: 0     Lidocaine 4 % Patch  Commonly known as: LIDOCARE  Used for: Chronic pain syndrome      Dose: 1 patch  Place 1 patch onto the skin every 24 hours To prevent lidocaine toxicity, patient should be patch free for 12 hrs daily.  Quantity: 30 patch  Refills: 0     lidocaine 7.5 mL, alum & mag hydroxide-simethicone 7.5 mL GI Cocktail  Used for: Gastric bypass status for obesity      Dose: 15 mL  Take 15 mLs by mouth once as needed for mouth, throat or stomach pain  Quantity: 1000 mL  Refills: 1     LORazepam 1 MG tablet  Commonly known as: ATIVAN  Used for: ADHD (attention deficit hyperactivity disorder), inattentive type      TAKE 1 TABLET (1MG) BY MOUTH AS NEEDED FOR ANXIETY WITH TPN AND MEDICATIONS . JUST ONCE A DAY (30 TO LAST 30 DAYS)  Quantity: 30 tablet  Refills: 0     naloxone 4 MG/0.1ML nasal spray  Commonly known as: NARCAN  Used for: Chronic pain syndrome      Dose: 4 mg  Spray 1 spray (4 mg) into one nostril alternating nostrils as needed for opioid reversal every 2-3 minutes until assistance arrives  Quantity: 0.2 mL  Refills: 0     nicotine 21 MG/24HR 24 hr patch  Commonly known as: NICODERM CQ  Used for: Former smoker      Dose: 1 patch  Place 1 patch onto the skin every 24 hours  Quantity: 30 " patch  Refills: 0     nystatin 419853 UNIT/GM external cream  Commonly known as: MYCOSTATIN  Used for: Yeast infection of the skin      Apply topically 2 times daily  Quantity: 30 g  Refills: 0     ondansetron 8 MG ODT tab  Commonly known as: ZOFRAN-ODT  Used for: Nausea      DISSOLVE ONE TABLET ON TONGUE EVERY 8 HOURS AS NEEDED FOR NAUSEA  Quantity: 90 tablet  Refills: 1     oxyCODONE 5 MG/5ML solution  Commonly known as: ROXICODONE  Used for: Chronic abdominal pain      Dose: 5-10 mg  Take 5-10 mLs (5-10 mg) by mouth 3 times daily as needed for pain Max of 30mg/day. Put at least 4 hours between doses. Fill 05/07/21 to start on/after 05/09/21. 30 day supply.  Quantity: 900 mL  Refills: 0     pantoprazole 40 MG EC tablet  Commonly known as: PROTONIX  Used for: S/P bariatric surgery      Dose: 40 mg  Take 1 tablet (40 mg) by mouth daily  Quantity: 30 tablet  Refills: 5     parenteral nutrition - PTA/DISCHARGE ORDER      Patient receives 2050 mL TPN every 14 hours through PICC at [facility].  Refills: 0     polyethylene glycol 17 g packet  Commonly known as: MIRALAX      Dose: 1 packet  Take 1 packet by mouth 2 times daily  Refills: 0     sodium chloride 0.45% Soln BOLUS  Used for: Gastric bypass status for obesity, Dehydration, Moderate protein-calorie malnutrition (H)      Dose: 2,000 mL  Inject 2,000 mLs into the vein daily as needed for other (dehydration)  Quantity: 2000 mL  Refills: 0     sucralfate 1 GM/10ML suspension  Commonly known as: CARAFATE  Used for: Gastric bypass status for obesity      TAKE 10MLS  BY MOUTH FOUR TIMES A DAY AS NEEDED  Quantity: 420 mL  Refills: 1     Ventolin  (90 Base) MCG/ACT inhaler  Used for: Productive cough  Generic drug: albuterol      INHALE TWO PUFFS BY MOUTH EVERY 4 HOURS AS NEEDED FOR SHORTNESS OF BREATH /DYSPNEA OR WHEEZING  Quantity: 1 Inhaler  Refills: 1     vitamin D2 25961 units (1250 mcg) capsule  Commonly known as: ERGOCALCIFEROL  Used for: Vitamin  "D deficiency      Dose: 50,000 Units  Take 1 capsule (50,000 Units) by mouth once a week  Quantity: 12 capsule  Refills: 3         * This list has 2 medication(s) that are the same as other medications prescribed for you. Read the directions carefully, and ask your doctor or other care provider to review them with you.            CONTINUE these medicines which have NOT CHANGED       Dose / Directions   insulin syringe-needle U-100 29G X 1/2\" 1 ML miscellaneous  Commonly known as: 29G X 1/2\" 1 ML  Used for: Bariatric surgery status      Use to inject b12 every 30 days  Quantity: 3 each  Refills: 4              Protect others around you: Learn how to safely use, store and throw away your medicines at www.disposemymeds.org.       Follow-ups after your visit       Your next 10 appointments already scheduled    May 12, 2021 12:45 PM  Return Visit with Patti Milligan MD  [facility] ([facility] ) [id number] [address] [id number] [phone]         Care Instructions       Further instructions from your care team                          Interventional Radiology                     Discharge Instructions                           Following Tunneled Catheter Placement    Your site(s) has been closed with:     The Catheter is sutured  to skin at  insertion site    Derma Peña (Skin Glue) on neck incision    Do not apply any ointments over site    This thin layer will slough off in 7-10 days               May gently remove Derma Peña in 10 days if still present         Tunneled catheter is always covered with a Sterile Transparent dressing    Keep site clean and dry     Cover with an occlusive dressing for showering    Weekly transparent dressing changes or when it becomes wet or dirty     If there is any oozing or bleeding from the site, apply direct pressure for 5-10 minutes with a gauze pad.  If bleeding continues after 10 minutes call your " primary doctor.  If bleeding cannot be controlled with direct pressure, call [phone].    Call your Doctor if:    Bleeding as above    Swelling in your neck or arm    Sudden onset of shortness of breath, lightheadedness, or heart palpitations..    Fever greater than 100.5  F    Other signs of infection such as, redness, tenderness, or drainage from the wound.    If you were given sedation:    We recommend an adult stay with you for the first 24 hours.    No driving or alcoholic beverages for 24 hours.    ADDITIONAL INSTRUCTIONS:         No heavy lifting greater than 10 lbs for 3 days.           May use ice pack for pain or minor swelling for 15 min 3-4 times per day.    [facility] Interventional Radiology Department    Physician: Linda COLLINS  Date:May 7, 2021  Telephone Numbers:  [phone].....8 am to 4:30 pm   Monday-Friday  Hospital : [phone]....After hours, Weekends, & Holidays.  Ask for the Interventional Radiologist on call.  Someone is on call 24 hours a day.   Emergency Department:  [phone]                                                                                                                                                              Additional Information About Your Visit       [email or web address] Information    [employer] gives you secure access to your electronic health record. If you see a primary care provider, you can also send messages to your care team and make appointments. If you have questions, please call your primary care clinic.  If you do not have a primary care provider, please call [phone] and they will assist you.       Care EveryWhere ID    This is your Care EveryWhere ID. This could be used by other organizations to access your [facility] medical records  [id number]       Your Vitals Were  Most recent update: 5/7/2021  1:33 PM    Blood Pressure   111/79          Pulse   60          Temperature   98.1  F (36.7  C) (Oral)          Respirations   14          Height   1.803 m (5'  "11\")             Weight   79.4 kg (175 lb)    Pulse Oximetry   99%    BMI (Body Mass Index)   24.41 kg/m           Primary Care Provider Office Phone # Fax #    Chuy Isaac [phone]      Equal Access to Services    KATHRYN GUZMAN : Hadii aad ku hadasho Soomaali, waaxda luqadaha, qaybta kaalmada adeegyada, waxay idiin hayaan adeeg kharash la'yosefn ah. So [facility] [phone].    ATENCIÓN: Si habla español, tiene a hicks disposición servicios gratuitos de asistencia lingüística. Llame al [phone].    We comply with applicable federal and state civil rights laws, including the [place] Human Rights Act. We do not discriminate on the basis of race, color, creed, Moravian, national origin, marital status, age, disability, sex, sexual orientation, or gender identity.    If you would like an itemization of your charges they will now be available in etaskr 30 days after discharge. To access the itemized statements in etaskr go to billing/billing summary. From there select view account. There will be multiple tabs showing an overview of your account, detail, payments, and communications. From the communications tab you can see your monthly statements, your itemized statements, and any billing letters generated for your account. If you do not have a etaskr account and need help getting access please contact etaskr support at [phone].  If you would prefer to have your itemized statements mailed please contact our automated itemized bill request line at [phone] option  2.       Thank you!    Thank you for choosing [facility] for your care. Our goal is always to provide you with excellent care. Hearing back from our patients is one way we can continue to improve our services. Please take a few minutes to complete the written survey that you may receive in the mail after you visit with us. Thank you!            Medication List      Medications          Morning Afternoon Evening Bedtime As Needed    " "insulin syringe-needle U-100 29G X 1/2\" 1 ML miscellaneous  Also known as: 29G X 1/2\" 1 ML  INSTRUCTIONS: Use to inject b12 every 30 days                       ASK your doctor about these medications          Morning Afternoon Evening Bedtime As Needed    acetaminophen 32 mg/mL liquid  Also known as: TYLENOL  INSTRUCTIONS: Take 15.65 mLs (500 mg) by mouth every 4 hours as needed for fever or mild pain                     alteplase 2 mg/2 mL Soln injection  Also known as: ACTIVASE  INSTRUCTIONS: Inject 1 mg into the vein as needed                     * amphetamine-dextroamphetamine 20 MG tablet  Also known as: ADDERALL  INSTRUCTIONS: Take 20 mg by mouth daily                     * amphetamine-dextroamphetamine 20 MG tablet  Also known as: Adderall  INSTRUCTIONS: TAKE ONE TABLET BY MOUTH ONCE DAILY                     carvedilol 6.25 MG tablet  Also known as: COREG  INSTRUCTIONS: Take 6.25 mg by mouth 2 times daily (with meals)                     cyanocobalamin 1000 MCG/ML injection  Also known as: CYANOCOBALAMIN  INSTRUCTIONS: Inject 1 mL (1,000 mcg) into the muscle every 30 days                     diphenhydrAMINE 12.5 MG/5ML syrup  Also known as: BENADRYL  INSTRUCTIONS: Take 25 mg by mouth every 4 hours as needed for itching, allergies or sleep                     EPINEPHrine 0.3 MG/0.3ML injection 2-pack  Also known as: ANY BX GENERIC EQUIV                     [facility]ED PATIENT  INSTRUCTIONS: Contact [facility] for patient specific medication information at [phone] on admission and discharge from the hospital.  Phones are answered 24 hours a day 7 days a week 365 days a year.    Providers - Choose \"CONTINUE HOME MED (no script)\" at discharge if patient treatment with home infusion will continue.  Doctor's comments: Resume prior to admission TPN/Lipids/saline                     fentaNYL 25 mcg/hr 72 hr patch  Also known as: DURAGESIC  INSTRUCTIONS: Place 1 patch onto the " skin every 48 hours remove old patch.  May fill 05/07/21 and start 05/09/21                     Lidocaine 4 % Patch  Also known as: LIDOCARE  INSTRUCTIONS: Place 1 patch onto the skin every 24 hours To prevent lidocaine toxicity, patient should be patch free for 12 hrs daily.  LAST TAKEN: Ask your nurse or doctor                     lidocaine 7.5 mL, alum & mag hydroxide-simethicone 7.5 mL GI Cocktail  INSTRUCTIONS: Take 15 mLs by mouth once as needed for mouth, throat or stomach pain                     LORazepam 1 MG tablet  Also known as: ATIVAN  INSTRUCTIONS: TAKE 1 TABLET (1MG) BY MOUTH AS NEEDED FOR ANXIETY WITH TPN AND MEDICATIONS . JUST ONCE A DAY (30 TO LAST 30 DAYS)                     naloxone 4 MG/0.1ML nasal spray  Also known as: NARCAN  INSTRUCTIONS: Spray 1 spray (4 mg) into one nostril alternating nostrils as needed for opioid reversal every 2-3 minutes until assistance arrives                     nicotine 21 MG/24HR 24 hr patch  Also known as: NICODERM CQ  INSTRUCTIONS: Place 1 patch onto the skin every 24 hours                     nystatin 471405 UNIT/GM external cream  Also known as: MYCOSTATIN  INSTRUCTIONS: Apply topically 2 times daily                     ondansetron 8 MG ODT tab  Also known as: ZOFRAN-ODT  INSTRUCTIONS: DISSOLVE ONE TABLET ON TONGUE EVERY 8 HOURS AS NEEDED FOR NAUSEA                     oxyCODONE 5 MG/5ML solution  Also known as: ROXICODONE  INSTRUCTIONS: Take 5-10 mLs (5-10 mg) by mouth 3 times daily as needed for pain Max of 30mg/day. Put at least 4 hours between doses. Fill 05/07/21 to start on/after 05/09/21. 30 day supply.                     pantoprazole 40 MG EC tablet  Also known as: PROTONIX  INSTRUCTIONS: Take 1 tablet (40 mg) by mouth daily                     parenteral nutrition - PTA/DISCHARGE ORDER  INSTRUCTIONS: Patient receives 2050 mL TPN every 14 hours through PICC at [facility].                     polyethylene glycol 17 g packet  Also known as:  MIRALAX  INSTRUCTIONS: Take 1 packet by mouth 2 times daily                     sodium chloride 0.45% Soln BOLUS  INSTRUCTIONS: Inject 2,000 mLs into the vein daily as needed for other (dehydration)  LAST TAKEN: Ask your nurse or doctor                     sucralfate 1 GM/10ML suspension  Also known as: CARAFATE  INSTRUCTIONS: TAKE 10MLS  BY MOUTH FOUR TIMES A DAY AS NEEDED                     Ventolin  (90 Base) MCG/ACT inhaler  INSTRUCTIONS: INHALE TWO PUFFS BY MOUTH EVERY 4 HOURS AS NEEDED FOR SHORTNESS OF BREATH /DYSPNEA OR WHEEZING  Generic drug: albuterol                     vitamin D2 65436 units (1250 mcg) capsule  Also known as: ERGOCALCIFEROL  INSTRUCTIONS: Take 1 capsule (50,000 Units) by mouth once a week                        * This list has 2 medication(s) that are the same as other medications prescribed for you. Read the directions carefully, and ask your doctor or other care provider to review them with you.

## 2021-05-07 NOTE — IP AVS SNAPSHOT
[facility] Interventional Radiology  [address] [address]  Phone: [phone]                                    After Visit Summary   5/7/2021    Parker Acevedo    MRN: [id number]           After Visit Summary Signature Page    I have received my discharge instructions, and my questions have been answered. I have discussed any challenges I see with this plan with the nurse or doctor.    ..........................................................................................................................................  Patient/Patient Representative Signature      ..........................................................................................................................................  Patient Representative Print Name and Relationship to Patient    ..................................................               ................................................  Date                                   Time    ..........................................................................................................................................  Reviewed by Signature/Title    ...................................................              ..............................................  Date                                               Time          22EPIC Rev 08/18

## 2021-05-07 NOTE — PROCEDURES
[facility]    Procedure: IR Procedure Note    Date/Time: 5/7/2021 1:26 PM  Performed by: Karla Jeter PA-C  Authorized by: Karla Jeter PA-C   IR Fellow Physician:  Other(s) attending procedure: Linda Queen PA-C    UNIVERSAL PROTOCOL   Site Marked: NA  Prior Images Obtained and Reviewed:  Yes  Required items: Required blood products, implants, devices and special equipment available    Patient identity confirmed:  Verbally with patient, arm band, provided demographic data and hospital-assigned identification number  Patient was reevaluated immediately before administering moderate or deep sedation or anesthesia  Confirmation Checklist:  Patient's identity using two indicators, relevant allergies, procedure was appropriate and matched the consent or emergent situation and correct equipment/implants were available  Time out: Immediately prior to the procedure a time out was called    Universal Protocol: the Joint Commission Universal Protocol was followed    Preparation: Patient was prepped and draped in usual sterile fashion           ANESTHESIA    Anesthesia: Local infiltration  Local Anesthetic:  Lidocaine 1% without epinephrine      SEDATION    Patient Sedated: Yes    Sedation:  Fentanyl and midazolam  Vital signs: Vital signs monitored during sedation    See dictated procedure note for full details.  Findings: 4 mg of versed, 200 mcg of fentanyl.    Specimens: none    Complications: None    Condition: Stable    Plan: Transport to  for recovery.    PROCEDURE   Patient Tolerance:  Patient tolerated the procedure well with no immediate complications  Describe Procedure: Completed placement of 9.5FR tunneled central venous catheter placement via RIJ.  Length is 25 cm with tip in right atrium.  Flushes and aspirates well.  Heplocked.  Okay to use immediately.  No immediate complications.      Length of time physician/provider present for 1:1 monitoring during sedation:  45

## 2021-05-07 NOTE — PROGRESS NOTES
Pt arrived on 2a post tunneled line exchange. VSS RA. Dressing c/d/i. No pain. Family at bedside. Pt sipping on juice.

## 2021-05-10 ENCOUNTER — MYC MEDICAL ADVICE (OUTPATIENT)
Dept: SURGERY | Facility: CLINIC | Age: 51
End: 2021-05-10

## 2021-05-10 ENCOUNTER — HOME INFUSION (PRE-WILLOW HOME INFUSION) (OUTPATIENT)
Dept: PHARMACY | Facility: CLINIC | Age: 51
End: 2021-05-10

## 2021-05-10 DIAGNOSIS — E44.0 MODERATE PROTEIN-CALORIE MALNUTRITION (H): Primary | ICD-10-CM

## 2021-05-10 NOTE — TELEPHONE ENCOUNTER
Order entered to restart patient on his previous TPN and hydration plan.  Left message for [place] pharmacist as well.

## 2021-05-10 NOTE — TELEPHONE ENCOUNTER
Informed patient that orders have not been reinstated yet.  Waiting for [facility] to call back.  Will send patient a [facility] message when orders are in.

## 2021-05-11 ENCOUNTER — MEDICAL CORRESPONDENCE (OUTPATIENT)
Dept: HEALTH INFORMATION MANAGEMENT | Facility: CLINIC | Age: 51
End: 2021-05-11

## 2021-05-11 ENCOUNTER — HOSPITAL ENCOUNTER (OUTPATIENT)
Dept: LAB | Facility: CLINIC | Age: 51
Discharge: HOME | End: 2021-05-11
Attending: SURGERY | Admitting: SURGERY
Payer: COMMERCIAL

## 2021-05-11 LAB
ALBUMIN SERPL-MCNC: 3.6 G/DL (ref 3.4–5)
ALP SERPL-CCNC: 73 U/L (ref 40–150)
ALT SERPL W P-5'-P-CCNC: 22 U/L (ref 0–70)
ANION GAP SERPL CALCULATED.3IONS-SCNC: 2 MMOL/L (ref 3–14)
AST SERPL W P-5'-P-CCNC: 15 U/L (ref 0–45)
BASOPHILS # BLD AUTO: 0.1 10E9/L (ref 0–0.2)
BASOPHILS NFR BLD AUTO: 0.7 %
BILIRUB SERPL-MCNC: 0.6 MG/DL (ref 0.2–1.3)
BUN SERPL-MCNC: 12 MG/DL (ref 7–30)
CALCIUM SERPL-MCNC: 8.6 MG/DL (ref 8.5–10.1)
CHLORIDE SERPL-SCNC: 106 MMOL/L (ref 94–109)
CO2 SERPL-SCNC: 34 MMOL/L (ref 20–32)
CREAT SERPL-MCNC: 0.85 MG/DL (ref 0.66–1.25)
DIFFERENTIAL METHOD BLD: ABNORMAL
EOSINOPHIL # BLD AUTO: 0.2 10E9/L (ref 0–0.7)
EOSINOPHIL NFR BLD AUTO: 2.7 %
ERYTHROCYTE [DISTWIDTH] IN BLOOD BY AUTOMATED COUNT: 13.9 % (ref 10–15)
GFR SERPL CREATININE-BSD FRML MDRD: >90 ML/MIN/{1.73_M2}
GLUCOSE SERPL-MCNC: 94 MG/DL (ref 70–99)
HCT VFR BLD AUTO: 37 % (ref 40–53)
HGB BLD-MCNC: 12.1 G/DL (ref 13.3–17.7)
IMM GRANULOCYTES # BLD: 0 10E9/L (ref 0–0.4)
IMM GRANULOCYTES NFR BLD: 0.1 %
LYMPHOCYTES # BLD AUTO: 1.6 10E9/L (ref 0.8–5.3)
LYMPHOCYTES NFR BLD AUTO: 24 %
MAGNESIUM SERPL-MCNC: 2.1 MG/DL (ref 1.6–2.3)
MCH RBC QN AUTO: 31.8 PG (ref 26.5–33)
MCHC RBC AUTO-ENTMCNC: 32.7 G/DL (ref 31.5–36.5)
MCV RBC AUTO: 97 FL (ref 78–100)
MONOCYTES # BLD AUTO: 1.1 10E9/L (ref 0–1.3)
MONOCYTES NFR BLD AUTO: 15.6 %
NEUTROPHILS # BLD AUTO: 3.8 10E9/L (ref 1.6–8.3)
NEUTROPHILS NFR BLD AUTO: 56.9 %
NRBC # BLD AUTO: 0 10*3/UL
NRBC BLD AUTO-RTO: 0 /100
PHOSPHATE SERPL-MCNC: 4.2 MG/DL (ref 2.5–4.5)
PLATELET # BLD AUTO: 151 10E9/L (ref 150–450)
POTASSIUM SERPL-SCNC: 3.7 MMOL/L (ref 3.4–5.3)
PROT SERPL-MCNC: 6.6 G/DL (ref 6.8–8.8)
RBC # BLD AUTO: 3.8 10E12/L (ref 4.4–5.9)
SODIUM SERPL-SCNC: 142 MMOL/L (ref 133–144)
WBC # BLD AUTO: 6.7 10E9/L (ref 4–11)

## 2021-05-11 PROCEDURE — 84100 ASSAY OF PHOSPHORUS: CPT | Performed by: SURGERY

## 2021-05-11 PROCEDURE — 80053 COMPREHEN METABOLIC PANEL: CPT | Performed by: SURGERY

## 2021-05-11 PROCEDURE — 85025 COMPLETE CBC W/AUTO DIFF WBC: CPT | Mod: GZ | Performed by: SURGERY

## 2021-05-11 PROCEDURE — 83735 ASSAY OF MAGNESIUM: CPT | Performed by: SURGERY

## 2021-05-11 NOTE — PROGRESS NOTES
[facility]    Date of visit: 5/12/21    Chief complaint:    Chief Complaint   Patient presents with     Pain       Interval history:  Parker Acevedo was last seen by me on 1/29/21    Recommendations/plan at the last visit included:  1. Physical therapy- not at this time with COVID  2. Medications: agreed to increase to 30mg/day of the oxycodone.  3. Follow up in 3 months-  in person      Since his last visit, Parker Acevedo reports:  -has been doing poorly over the last few weeks. More fatigue. Had issues with his lines, as he was nortriptyline getting the nutrition he needs.  -has had some intermittent abdominal pain that is different. He was told to get a colonoscopy due to a bacteria found.  -otherwise stable. Oxycodone and fentanyl working well      Pain scores:  Extreme Pain (8)     Current pain treatments:   Fentanyl 25mcg/hr patch q48 hours -  Previously was at 50mcg/hr  Lidocaine patches- as needed  Naloxone- has from previous hospitalization.  Oxycodone max of 30mg a day.-    Previous medication treatments included:   Valium 5 mg/day, 1 tab in AM and PM-stopped in 11/2017  Tylenol #3   Vicodin   Tramadol   Diazepam- for sleep/anxiety  Gabapentin- bad headaches, acid reflux  Oxycodone max of 45mg/day.        Side Effects: no side effects    Medications:  Current Outpatient Medications   Medication Sig Dispense Refill     acetaminophen (TYLENOL) 32 mg/mL liquid Take 15.65 mLs (500 mg) by mouth every 4 hours as needed for fever or mild pain 455 mL 1     alteplase 2 mg/2 mL (in 10 mL syringe) Inject 1 mg into the vein as needed       amphetamine-dextroamphetamine (ADDERALL) 20 MG tablet TAKE ONE TABLET BY MOUTH ONCE DAILY 30 tablet 0     amphetamine-dextroamphetamine (ADDERALL) 20 MG tablet Take 20 mg by mouth daily       carvedilol (COREG) 6.25 MG tablet Take 6.25 mg by mouth 2 times daily (with meals)       cyanocobalamin (CYANOCOBALAMIN) 1000  "MCG/ML injection Inject 1 mL (1,000 mcg) into the muscle every 30 days 1 mL 11     diphenhydrAMINE (BENADRYL) 12.5 MG/5ML syrup Take 25 mg by mouth every 4 hours as needed for itching, allergies or sleep 237 mL 0     EPINEPHrine (ANY BX GENERIC EQUIV) 0.3 MG/0.3ML injection 2-pack        [facility] MANAGED PATIENT Contact [facility] for patient specific medication information at [phone] on admission and discharge from the hospital.  Phones are answered 24 hours a day 7 days a week 365 days a year.    Providers - Choose \"CONTINUE HOME MED (no script)\" at discharge if patient treatment with home infusion will continue.       fentaNYL (DURAGESIC) 25 mcg/hr 72 hr patch Place 1 patch onto the skin every 48 hours remove old patch.  May fill 05/07/21 and start 05/09/21 15 patch 0     insulin syringe-needle U-100 (29G X 1/2\" 1 ML) 29G X 1/2\" 1 ML miscellaneous Use to inject b12 every 30 days 3 each 4     Lidocaine (LIDOCARE) 4 % Patch Place 1 patch onto the skin every 24 hours To prevent lidocaine toxicity, patient should be patch free for 12 hrs daily. 30 patch 0     lidocaine 7.5 mL, alum & mag hydroxide-simethicone 7.5 mL GI Cocktail Take 15 mLs by mouth once as needed for mouth, throat or stomach pain 1000 mL 1     LORazepam (ATIVAN) 1 MG tablet TAKE 1 TABLET (1MG) BY MOUTH AS NEEDED FOR ANXIETY WITH TPN AND MEDICATIONS . JUST ONCE A DAY (30 TO LAST 30 DAYS) 30 tablet 0     naloxone (NARCAN) 4 MG/0.1ML nasal spray Spray 1 spray (4 mg) into one nostril alternating nostrils as needed for opioid reversal every 2-3 minutes until assistance arrives 0.2 mL 0     nicotine (NICODERM CQ) 21 MG/24HR 24 hr patch Place 1 patch onto the skin every 24 hours 30 patch 0     nystatin (MYCOSTATIN) 238257 UNIT/GM external cream Apply topically 2 times daily 30 g 0     ondansetron (ZOFRAN-ODT) 8 MG ODT tab DISSOLVE ONE TABLET ON TONGUE EVERY 8 HOURS AS NEEDED FOR NAUSEA 90 tablet 1     oxyCODONE (ROXICODONE) " 5 MG/5ML solution Take 5-10 mLs (5-10 mg) by mouth 3 times daily as needed for pain Max of 30mg/day. Put at least 4 hours between doses. Fill 05/07/21 to start on/after 05/09/21. 30 day supply. 900 mL 0     pantoprazole (PROTONIX) 40 MG EC tablet Take 1 tablet (40 mg) by mouth daily 30 tablet 5     parenteral nutrition - PTA/DISCHARGE ORDER Patient receives 2050 mL TPN every 14 hours through PICC at [facility].       polyethylene glycol (MIRALAX) 17 g packet Take 1 packet by mouth 2 times daily       sodium chloride 0.45% SOLN BOLUS Inject 2,000 mLs into the vein daily as needed for other (dehydration) 2000 mL 0     sucralfate (CARAFATE) 1 GM/10ML suspension TAKE 10MLS  BY MOUTH FOUR TIMES A DAY AS NEEDED 420 mL 1     VENTOLIN  (90 Base) MCG/ACT inhaler INHALE TWO PUFFS BY MOUTH EVERY 4 HOURS AS NEEDED FOR SHORTNESS OF BREATH /DYSPNEA OR WHEEZING 1 Inhaler 1     vitamin D2 (ERGOCALCIFEROL) 78593 units (1250 mcg) capsule Take 1 capsule (50,000 Units) by mouth once a week 12 capsule 3       Medical History: any changes in medical history since they were last seen? As above    Physical exam:  /80   Pulse 70   Wt 79.4 kg (175 lb 1.6 oz)   SpO2 96%   BMI 24.42 kg/m    Gen: awake, alert   Gait: forward flexed  MSK exam: poor posture.  Full range of motion of neck/shoulders.  nutristion currently running     THE 4 A's OF OPIOID MAINTENANCE ANALGESIA     Analgesia: adequate    Activity: on disability    Adverse effects: none    Adherence to Rx protocol: good- [place] Prescription Monitoring Program checked 5/12/2021 appropriate    Last UDS and opioid agreement - 1/5/21      Assessment:   1. Chronic abdominal pain, with history of gastric bypass and later peptic ulcer   2. G tube placement- only used for venting  3. Myofascial abdominal pain, with significant guarding and poor posture  4. Opioid tolerance  5. Anxiety  6. Foot pain with tendonous injury- healed  7. Left arm weakness, consistent with  radial nerve injury- improving  8. Port placement- h/o recurrent infections, getting TPN    Plan:   1. Physical therapy- not at this time   2. Medications: no changes  3. Follow up in 3 months-  in person    20 minutes spent on the date of encounter doing chart review, history, and exam documentation and further activities as noted above.     Jessica Milligan MD  [facility]

## 2021-05-12 ENCOUNTER — OFFICE VISIT (OUTPATIENT)
Dept: PALLIATIVE MEDICINE | Facility: CLINIC | Age: 51
End: 2021-05-12
Payer: COMMERCIAL

## 2021-05-12 VITALS
WEIGHT: 175.1 LBS | OXYGEN SATURATION: 96 % | DIASTOLIC BLOOD PRESSURE: 80 MMHG | BODY MASS INDEX: 24.42 KG/M2 | SYSTOLIC BLOOD PRESSURE: 133 MMHG | HEART RATE: 70 BPM

## 2021-05-12 DIAGNOSIS — G89.29 CHRONIC ABDOMINAL PAIN: Primary | ICD-10-CM

## 2021-05-12 DIAGNOSIS — G89.4 CHRONIC PAIN SYNDROME: ICD-10-CM

## 2021-05-12 DIAGNOSIS — M54.2 CERVICALGIA: ICD-10-CM

## 2021-05-12 DIAGNOSIS — E43 SEVERE MALNUTRITION (H): ICD-10-CM

## 2021-05-12 DIAGNOSIS — R10.9 CHRONIC ABDOMINAL PAIN: Primary | ICD-10-CM

## 2021-05-12 PROCEDURE — 99213 OFFICE O/P EST LOW 20 MIN: CPT | Performed by: PSYCHIATRY & NEUROLOGY

## 2021-05-12 ASSESSMENT — PAIN SCALES - GENERAL: PAINLEVEL: EXTREME PAIN (8)

## 2021-05-12 NOTE — PATIENT INSTRUCTIONS
If you fit into one of these categories, please log in to [facility]OT[facility] using this link to see if we have an open appointment and schedule an appointment.  Most new appointments are released on Tuesdays at 8 a.m. This is when appointment availability is best. Additional appointments may open up during the week as appointments are canceled or we receive additional vaccine.    If there are no appointments left, you will be unable to schedule.   If you have technical difficulty using OOTU, call [phone] for assistance.      You can learn more about the state's phased approach to administering the vaccine, with details on each phase,?[email or web address].     As vaccine supply increases and we are able to open appointments to more groups, we will share that information on our website:  [email or web address]. Check this website to stay up to date on COVID-19 vaccination information.                 ----------------------------------------------------------------  Clinic Number:  [phone]     Call with any questions about your care and for scheduling assistance.     Calls are returned Monday through Friday between 8 AM and 4:30 PM. We usually get back to you within 2 business days depending on the issue/request.    If we are prescribing your medications:    For opioid medication refills, call the clinic or send a OOTU message 7 days in advance.  Please include:    Name of requested medication    Name of the pharmacy.    For non-opioid medications, call your pharmacy directly to request a refill. Please allow 3-4 days to be processed.     Per [place] State Law:    All controlled substance prescriptions must be filled within 30 days of being written.      For those controlled substances allowing refills, pickup must occur within 30 days of last fill.      We believe regular attendance is key to your success in our program!      Any time you  are unable to keep your appointment we ask that you call us at least 24 hours in advance to cancel.This will allow us to offer the appointment time to another patient.     Multiple missed appointments may lead to dismissal from the clinic.

## 2021-05-13 ENCOUNTER — CARE COORDINATION (OUTPATIENT)
Dept: ENDOCRINOLOGY | Facility: CLINIC | Age: 51
End: 2021-05-13

## 2021-05-13 NOTE — PROGRESS NOTES
This is a recent snapshot of the patient's [facility] medical record.  For current drug dose and complete information and questions, call [phone] or In Basket pool, fv home infusion ([id number])  CSN Number:  [id number]

## 2021-05-13 NOTE — PROGRESS NOTES
Home care RN, Nathaly, checking in regarding visit on Tuesday with patient.  States visit went well.  Wife present at visit.  Encouraged home care nurse to have wife demonstrated dressing changes should she need to perform one.  This will allow them to check her technique.  Concerns were discussed regarding patient's past history regarding Cm catheter getting cut and also breaking apart.

## 2021-05-14 ENCOUNTER — HOME INFUSION (PRE-WILLOW HOME INFUSION) (OUTPATIENT)
Dept: PHARMACY | Facility: CLINIC | Age: 51
End: 2021-05-14

## 2021-05-17 NOTE — PROGRESS NOTES
This is a recent snapshot of the patient's [facility] medical record.  For current drug dose and complete information and questions, call [phone] or In Basket pool, fv home infusion ([id number])  CSN Number:  [id number]

## 2021-05-17 NOTE — PROGRESS NOTES
This is a recent snapshot of the patient's [facility] medical record.  For current drug dose and complete information and questions, call [phone] or In Basket pool, fv home infusion ([id number])  CSN Number:  [id number]       [No Acute Distress] : no acute distress [Well Nourished] : well nourished [Well Developed] : well developed [No Lymphadenopathy] : no lymphadenopathy [No Respiratory Distress] : no respiratory distress  [No Accessory Muscle Use] : no accessory muscle use [Clear to Auscultation] : lungs were clear to auscultation bilaterally [No Edema] : there was no peripheral edema [Soft] : abdomen soft [Non Tender] : non-tender [Non-distended] : non-distended [Normal Posterior Cervical Nodes] : no posterior cervical lymphadenopathy [Normal Anterior Cervical Nodes] : no anterior cervical lymphadenopathy [No Rash] : no rash [Coordination Grossly Intact] : coordination grossly intact [Normal Gait] : normal gait [Normal Mental Status] : the patient's orientation, memory, attention, language and fund of knowledge were normal [Appropriate] : appropriate [Normal Rate] : a normal rate [Normal Rhythm] : a normal rhythm [Normal Tone] : normal tone [Normal Volume] : normal volume [Normal] : normal throught processes [Impaired judgment] : intact judgment [Impaired Insight] : intact insight [Loose Associations] : no loosening of associations [Delusions] : exhibited no delusions [Hallucinations] : exhibited no hallucinations [Obsessions] : denied obsessions [Preoccupation with Violence] : denied preoccupation with violent thoughts [Suicidal Ideation] : denied suicidal ideation [Suicidal Intent] : denied suicidal intent [Suicidal Plan] : denied suicidal plans [Homicidal Ideation] : denied homicidal ideation [Homicidal Intent] : denied homicidal intention [Homicidal Plan] : denied homicidal plans [de-identified] : mild sinus congestion, PND improved

## 2021-05-18 ENCOUNTER — HOME INFUSION (PRE-WILLOW HOME INFUSION) (OUTPATIENT)
Dept: PHARMACY | Facility: CLINIC | Age: 51
End: 2021-05-18

## 2021-05-18 ENCOUNTER — MEDICAL CORRESPONDENCE (OUTPATIENT)
Dept: HEALTH INFORMATION MANAGEMENT | Facility: CLINIC | Age: 51
End: 2021-05-18

## 2021-05-18 ENCOUNTER — HOSPITAL ENCOUNTER (OUTPATIENT)
Dept: LAB | Facility: CLINIC | Age: 51
Discharge: HOME | End: 2021-05-18
Attending: INTERNAL MEDICINE | Admitting: SURGERY
Payer: COMMERCIAL

## 2021-05-18 LAB
BASOPHILS # BLD AUTO: 0 10E9/L (ref 0–0.2)
BASOPHILS NFR BLD AUTO: 0.6 %
DIFFERENTIAL METHOD BLD: ABNORMAL
EOSINOPHIL # BLD AUTO: 0.2 10E9/L (ref 0–0.7)
EOSINOPHIL NFR BLD AUTO: 3.4 %
ERYTHROCYTE [DISTWIDTH] IN BLOOD BY AUTOMATED COUNT: 13.6 % (ref 10–15)
HCT VFR BLD AUTO: 38.8 % (ref 40–53)
HGB BLD-MCNC: 12.6 G/DL (ref 13.3–17.7)
IMM GRANULOCYTES # BLD: 0 10E9/L (ref 0–0.4)
IMM GRANULOCYTES NFR BLD: 0.3 %
LYMPHOCYTES # BLD AUTO: 1.8 10E9/L (ref 0.8–5.3)
LYMPHOCYTES NFR BLD AUTO: 28.8 %
MAGNESIUM SERPL-MCNC: 1.1 MG/DL (ref 1.6–2.3)
MCH RBC QN AUTO: 31.7 PG (ref 26.5–33)
MCHC RBC AUTO-ENTMCNC: 32.5 G/DL (ref 31.5–36.5)
MCV RBC AUTO: 98 FL (ref 78–100)
MONOCYTES # BLD AUTO: 0.5 10E9/L (ref 0–1.3)
MONOCYTES NFR BLD AUTO: 7.9 %
NEUTROPHILS # BLD AUTO: 3.7 10E9/L (ref 1.6–8.3)
NEUTROPHILS NFR BLD AUTO: 59 %
NRBC # BLD AUTO: 0 10*3/UL
NRBC BLD AUTO-RTO: 0 /100
PHOSPHATE SERPL-MCNC: 1.5 MG/DL (ref 2.5–4.5)
PLATELET # BLD AUTO: 145 10E9/L (ref 150–450)
RBC # BLD AUTO: 3.97 10E12/L (ref 4.4–5.9)
WBC # BLD AUTO: 6.2 10E9/L (ref 4–11)

## 2021-05-18 PROCEDURE — 84100 ASSAY OF PHOSPHORUS: CPT | Performed by: SURGERY

## 2021-05-18 PROCEDURE — 83735 ASSAY OF MAGNESIUM: CPT | Performed by: SURGERY

## 2021-05-18 PROCEDURE — 85025 COMPLETE CBC W/AUTO DIFF WBC: CPT | Performed by: SURGERY

## 2021-05-19 ENCOUNTER — MEDICAL CORRESPONDENCE (OUTPATIENT)
Dept: HEALTH INFORMATION MANAGEMENT | Facility: CLINIC | Age: 51
End: 2021-05-19

## 2021-05-19 ENCOUNTER — HOSPITAL ENCOUNTER (OUTPATIENT)
Dept: LAB | Facility: CLINIC | Age: 51
Discharge: HOME | End: 2021-05-19
Attending: SURGERY | Admitting: SURGERY
Payer: COMMERCIAL

## 2021-05-19 LAB
ALBUMIN SERPL-MCNC: 3.4 G/DL (ref 3.4–5)
ALP SERPL-CCNC: 62 U/L (ref 40–150)
ALT SERPL W P-5'-P-CCNC: 21 U/L (ref 0–70)
ANION GAP SERPL CALCULATED.3IONS-SCNC: 3 MMOL/L (ref 3–14)
AST SERPL W P-5'-P-CCNC: 14 U/L (ref 0–45)
BASOPHILS # BLD AUTO: 0.1 10E9/L (ref 0–0.2)
BASOPHILS NFR BLD AUTO: 0.8 %
BILIRUB SERPL-MCNC: 0.3 MG/DL (ref 0.2–1.3)
BUN SERPL-MCNC: 14 MG/DL (ref 7–30)
CALCIUM SERPL-MCNC: 8.4 MG/DL (ref 8.5–10.1)
CHLORIDE SERPL-SCNC: 110 MMOL/L (ref 94–109)
CO2 SERPL-SCNC: 30 MMOL/L (ref 20–32)
CREAT SERPL-MCNC: 0.72 MG/DL (ref 0.66–1.25)
DIFFERENTIAL METHOD BLD: ABNORMAL
EOSINOPHIL # BLD AUTO: 0.2 10E9/L (ref 0–0.7)
EOSINOPHIL NFR BLD AUTO: 3.4 %
ERYTHROCYTE [DISTWIDTH] IN BLOOD BY AUTOMATED COUNT: 13.8 % (ref 10–15)
GFR SERPL CREATININE-BSD FRML MDRD: >90 ML/MIN/{1.73_M2}
GLUCOSE SERPL-MCNC: 88 MG/DL (ref 70–99)
HCT VFR BLD AUTO: 38.5 % (ref 40–53)
HGB BLD-MCNC: 12.5 G/DL (ref 13.3–17.7)
IMM GRANULOCYTES # BLD: 0 10E9/L (ref 0–0.4)
IMM GRANULOCYTES NFR BLD: 0.2 %
LYMPHOCYTES # BLD AUTO: 1.7 10E9/L (ref 0.8–5.3)
LYMPHOCYTES NFR BLD AUTO: 28.2 %
MAGNESIUM SERPL-MCNC: 2 MG/DL (ref 1.6–2.3)
MCH RBC QN AUTO: 31.3 PG (ref 26.5–33)
MCHC RBC AUTO-ENTMCNC: 32.5 G/DL (ref 31.5–36.5)
MCV RBC AUTO: 97 FL (ref 78–100)
MONOCYTES # BLD AUTO: 0.6 10E9/L (ref 0–1.3)
MONOCYTES NFR BLD AUTO: 9.6 %
NEUTROPHILS # BLD AUTO: 3.5 10E9/L (ref 1.6–8.3)
NEUTROPHILS NFR BLD AUTO: 57.8 %
NRBC # BLD AUTO: 0 10*3/UL
NRBC BLD AUTO-RTO: 0 /100
PHOSPHATE SERPL-MCNC: 3.5 MG/DL (ref 2.5–4.5)
PLATELET # BLD AUTO: 159 10E9/L (ref 150–450)
POTASSIUM SERPL-SCNC: 3.6 MMOL/L (ref 3.4–5.3)
PROT SERPL-MCNC: 6.6 G/DL (ref 6.8–8.8)
RBC # BLD AUTO: 3.99 10E12/L (ref 4.4–5.9)
SODIUM SERPL-SCNC: 143 MMOL/L (ref 133–144)
WBC # BLD AUTO: 6.1 10E9/L (ref 4–11)

## 2021-05-19 PROCEDURE — 84100 ASSAY OF PHOSPHORUS: CPT | Performed by: SURGERY

## 2021-05-19 PROCEDURE — 83735 ASSAY OF MAGNESIUM: CPT | Performed by: SURGERY

## 2021-05-19 PROCEDURE — 85025 COMPLETE CBC W/AUTO DIFF WBC: CPT | Mod: GZ | Performed by: SURGERY

## 2021-05-19 PROCEDURE — 80053 COMPREHEN METABOLIC PANEL: CPT | Performed by: SURGERY

## 2021-05-20 ENCOUNTER — HOME INFUSION (PRE-WILLOW HOME INFUSION) (OUTPATIENT)
Dept: PHARMACY | Facility: CLINIC | Age: 51
End: 2021-05-20

## 2021-05-21 ENCOUNTER — MYC REFILL (OUTPATIENT)
Dept: INTERNAL MEDICINE | Facility: CLINIC | Age: 51
End: 2021-05-21

## 2021-05-21 DIAGNOSIS — F90.0 ADHD (ATTENTION DEFICIT HYPERACTIVITY DISORDER), INATTENTIVE TYPE: ICD-10-CM

## 2021-05-21 RX ORDER — LORAZEPAM 1 MG/1
TABLET ORAL
Qty: 30 TABLET | Refills: 0 | OUTPATIENT
Start: 2021-05-21

## 2021-05-21 RX ORDER — LORAZEPAM 1 MG/1
TABLET ORAL
Qty: 30 TABLET | Refills: 0 | Status: SHIPPED | OUTPATIENT
Start: 2021-05-21 | End: 2021-06-20

## 2021-05-21 NOTE — TELEPHONE ENCOUNTER
Duplicate request.  Pharmacy notified via E-Prescribe refusal.     Kinsey Muhammad, YADIRAN, RN  [facility]

## 2021-05-21 NOTE — TELEPHONE ENCOUNTER
Ativan      Last Written Prescription Date:  4/26/2021  Last Fill Quantity: 30,   # refills: 0  Last Office Visit: 2/22/2021  Future Office visit:    Next 5 appointments (look out 90 days)    Aug 11, 2021 12:45 PM  Return Visit with Patti Milligan MD  [facility] [place] ([facility] [place] ) [address]  [place] [address]  [phone]           Routing refill request to provider for review/approval because:  Drug not on the [facility], [facility] or [facility] refill protocol or controlled substance

## 2021-05-24 ENCOUNTER — MYC REFILL (OUTPATIENT)
Dept: INTERNAL MEDICINE | Facility: CLINIC | Age: 51
End: 2021-05-24

## 2021-05-24 DIAGNOSIS — F90.0 ADHD (ATTENTION DEFICIT HYPERACTIVITY DISORDER), INATTENTIVE TYPE: ICD-10-CM

## 2021-05-24 RX ORDER — DEXTROAMPHETAMINE SACCHARATE, AMPHETAMINE ASPARTATE, DEXTROAMPHETAMINE SULFATE AND AMPHETAMINE SULFATE 5; 5; 5; 5 MG/1; MG/1; MG/1; MG/1
TABLET ORAL
Qty: 30 TABLET | Refills: 0 | Status: SHIPPED | OUTPATIENT
Start: 2021-05-24 | End: 2021-06-20

## 2021-05-24 NOTE — TELEPHONE ENCOUNTER
Adderall 20mg       Last Written Prescription Date:  04/23/2021  Last Fill Quantity: 30,   # refills: 0  Last Office Visit: 05/12/2021 w/ pain management  Future Office visit:    Next 5 appointments (look out 90 days)    Aug 11, 2021 12:45 PM  Return Visit with Patti Milligan MD  [facility] [place] ([facility] [place] ) [address]  [place] [address]  [phone]           Routing refill request to provider for review/approval because:  Drug not on the [facility], [facility] or [facility] refill protocol or controlled substance

## 2021-05-25 ENCOUNTER — TRANSFERRED RECORDS (OUTPATIENT)
Dept: HEALTH INFORMATION MANAGEMENT | Facility: CLINIC | Age: 51
End: 2021-05-25

## 2021-05-25 ENCOUNTER — HOME INFUSION (PRE-WILLOW HOME INFUSION) (OUTPATIENT)
Dept: PHARMACY | Facility: CLINIC | Age: 51
End: 2021-05-25

## 2021-05-25 ENCOUNTER — MEDICAL CORRESPONDENCE (OUTPATIENT)
Dept: HEALTH INFORMATION MANAGEMENT | Facility: CLINIC | Age: 51
End: 2021-05-25

## 2021-05-25 ENCOUNTER — HOSPITAL ENCOUNTER (OUTPATIENT)
Dept: LAB | Facility: CLINIC | Age: 51
Discharge: HOME | End: 2021-05-25
Admitting: SURGERY
Payer: COMMERCIAL

## 2021-05-25 LAB
ALBUMIN SERPL-MCNC: 3.5 G/DL (ref 3.4–5)
ALP SERPL-CCNC: 65 U/L (ref 40–150)
ALT SERPL W P-5'-P-CCNC: 26 U/L (ref 0–70)
ANION GAP SERPL CALCULATED.3IONS-SCNC: 6 MMOL/L (ref 3–14)
AST SERPL W P-5'-P-CCNC: 14 U/L (ref 0–45)
BILIRUB SERPL-MCNC: 0.3 MG/DL (ref 0.2–1.3)
BUN SERPL-MCNC: 16 MG/DL (ref 7–30)
CALCIUM SERPL-MCNC: 8.3 MG/DL (ref 8.5–10.1)
CHLORIDE SERPL-SCNC: 109 MMOL/L (ref 94–109)
CO2 SERPL-SCNC: 28 MMOL/L (ref 20–32)
CREAT SERPL-MCNC: 0.81 MG/DL (ref 0.66–1.25)
ERYTHROCYTE [DISTWIDTH] IN BLOOD BY AUTOMATED COUNT: 13.7 % (ref 10–15)
GFR SERPL CREATININE-BSD FRML MDRD: >90 ML/MIN/{1.73_M2}
GLUCOSE SERPL-MCNC: 108 MG/DL (ref 70–99)
HCT VFR BLD AUTO: 38.6 % (ref 40–53)
HGB BLD-MCNC: 12.8 G/DL (ref 13.3–17.7)
MAGNESIUM SERPL-MCNC: 2 MG/DL (ref 1.6–2.3)
MCH RBC QN AUTO: 31.8 PG (ref 26.5–33)
MCHC RBC AUTO-ENTMCNC: 33.2 G/DL (ref 31.5–36.5)
MCV RBC AUTO: 96 FL (ref 78–100)
PHOSPHATE SERPL-MCNC: 3.4 MG/DL (ref 2.5–4.5)
PLATELET # BLD AUTO: 165 10E9/L (ref 150–450)
POTASSIUM SERPL-SCNC: 3.6 MMOL/L (ref 3.4–5.3)
PROT SERPL-MCNC: 6.8 G/DL (ref 6.8–8.8)
RBC # BLD AUTO: 4.02 10E12/L (ref 4.4–5.9)
SODIUM SERPL-SCNC: 143 MMOL/L (ref 133–144)
WBC # BLD AUTO: 6.1 10E9/L (ref 4–11)

## 2021-05-25 PROCEDURE — 83735 ASSAY OF MAGNESIUM: CPT | Performed by: SURGERY

## 2021-05-25 PROCEDURE — 85027 COMPLETE CBC AUTOMATED: CPT | Performed by: SURGERY

## 2021-05-25 PROCEDURE — 80053 COMPREHEN METABOLIC PANEL: CPT | Performed by: SURGERY

## 2021-05-25 PROCEDURE — 84100 ASSAY OF PHOSPHORUS: CPT | Performed by: SURGERY

## 2021-05-26 ENCOUNTER — HOME INFUSION (PRE-WILLOW HOME INFUSION) (OUTPATIENT)
Dept: PHARMACY | Facility: CLINIC | Age: 51
End: 2021-05-26

## 2021-05-27 ENCOUNTER — HOME INFUSION (PRE-WILLOW HOME INFUSION) (OUTPATIENT)
Dept: PHARMACY | Facility: CLINIC | Age: 51
End: 2021-05-27

## 2021-05-27 ENCOUNTER — MYC REFILL (OUTPATIENT)
Dept: PALLIATIVE MEDICINE | Facility: CLINIC | Age: 51
End: 2021-05-27

## 2021-05-27 ENCOUNTER — PATIENT OUTREACH (OUTPATIENT)
Dept: FAMILY MEDICINE | Facility: CLINIC | Age: 51
End: 2021-05-27
Payer: COMMERCIAL

## 2021-05-27 DIAGNOSIS — G89.29 CHRONIC ABDOMINAL PAIN: ICD-10-CM

## 2021-05-27 DIAGNOSIS — G89.4 CHRONIC PAIN SYNDROME: ICD-10-CM

## 2021-05-27 DIAGNOSIS — R10.9 CHRONIC ABDOMINAL PAIN: ICD-10-CM

## 2021-05-27 NOTE — PROGRESS NOTES
"Clinic Care Coordination Contact    Follow Up Progress Note      Assessment: Called and spoke with Dino cazares's wife, states pt is feeling much better after getting his new azevedo. States the port he had \"blew out\" and they had to wait to get the new one inserted. Unfortunately pt was having to drink as much fluids as possible during this time. Once they did get the azevedo in the infusion/TPN orders were cancelled and not reordered.  However, pt now has everything he needs to keep hydrated.  Wife is asking for another referral to GI as she called to schedule consult and they told her they do not have an order.  Wife is hoping if he needs a colonoscopy they can do that and place his carlos at the same time. Advised PCP is out of clinic until next week and he will review at that time.  She was ok with plan.      Goals addressed this encounter:      #2 Medical (pt-stated)      Goal Statement: I want to prevent hospital visits  Date Goal set: 9/9/2020   Barriers: on chronic TPN, IV line  Strengths: home infusion, care coordination  Date to Achieve By: 12/9/21  Patient expressed understanding of goal: yes  Action steps to achieve this goal:  1. I will complete IV antibiotics as prescribed. (completed)  2. I will follow up with infectious disease provider as scheduled 9/24/20. (completed)  3. I will stay at home during COVID-19 pandemic.   4. I will monitor my temperature daily as instructed and contact [facility] for elevated temperature parameters.  5. I will have G-tube replaced as scheduled. (completed)          Intervention/Education provided during outreach: someone from GI will contact them to schedule once they received the orders could take up to two weeks.      Outreach Frequency: monthly    Plan:   Care Coordinator will follow up in one month.     Dianelys KHAN, RN, PHN, CCM  Primary Clinic Care Coordination    [facility]  Primary Care Clinics  [email or web address] "   Office: [phone]  Employed by [facility] [place] [facility]

## 2021-05-27 NOTE — LETTER
[facility] CARE COORDINATION  [address]   [address]  Tel. [phone] / Fax [phone]    May 27, 2021        Parker Acevedo  [address] [place] [address]          Dear Miguel Canales is an updated Complex Care Plan for your continued enrollment in Care Coordination. Please let us know if you have additional questions, concerns, or goals that we can assist with.    Sincerely,    Dianelys MAYN, RN, PHN, [facility]  Primary Clinic Care Coordination    [facility]  Primary Care Clinics  [email or web address]   Office: [phone]  Employed by [employer] [employer]  Complex Care Plan  About Me:    Patient Name:  Parker Acevedo    YOB: 1970  Age:         50 year old   [place] MRN:    [id number] Telephone Information:  Home Phone [phone]   Mobile [phone]       Address:  [address] [place] [address] Email address:  [email or web address]      Emergency Contact(s)    Name Relationship Lgl Grd Work Phone Home Phone Mobile Phone   1. RACHEL, BERTRAND* Spouse No  [phone]    2. JEYSON CAIN * Relative No  [phone]   3. CHARLI ACEVEDO* Mother No  [phone]           Primary language:  English     needed? No   [place] [facility]:  [phone] op. 1  Other communication barriers: Glasses, Physical impairment  Preferred Method of Communication:  MyChart  Current living arrangement:    Mobility Status/ Medical Equipment:      Health Maintenance  Health Maintenance Reviewed:      My Access Plan  Medical Emergency 911   Primary Clinic Line [facility] - [phone]   24 Hour Appointment Line [phone] or  [phone]) (toll-free)   24 Hour Nurse Line [phone] (toll-free)   Preferred Urgent Care      Preferred Hospital     Preferred Pharmacy [facility] [place] [address]     Behavioral Health Crisis Line The National Suicide Prevention Lifeline at 1-901.775.4897 or 911       My Care Team Members  Patient Care Team       Relationship Specialty Notifications Start End    Chuy Isaac MD PCP - General Internal Medicine  10/30/18     Phone: [phone] Fax: [phone]         [address] [facility] [address]    Patti Milligan MD MD Pain Clinic  6/2/15     Phone: [phone] Fax: [phone]         [address] [place] [address] [facility] [address]    Chuy Isaac MD Assigned PCP   11/11/18     Phone: [phone] Fax: [phone]         [address] [place] [address]    Marlyn Jenkins MD MD Ophthalmology  1/29/19     Phone: [phone] Fax: [phone]         [address] [facility] [address] [facility] [address]    Marlyn Jenkins MD MD Ophthalmology  2/11/19     Phone: [phone] Fax: [phone]         [address] [facility] [address] [facility] [address]    Марина Buckner MD Assigned Infectious Disease Provider   10/23/20     Phone: [phone] Fax: [phone]         [address] [facility] [address] [facility] [address]    Francis Vyas MD Assigned Surgical Provider   10/23/20     Phone: [phone] Fax: [phone]         [address] [facility] [address] [facility] [address]    Skinny Friend DPM Assigned Musculoskeletal Provider   10/23/20     Phone: [phone] Fax: [phone]         [address] [facility] [place] [address]    Stormy Rankin, RN Lead Care Coordinator Primary Care - CC Admissions 12/1/20     Phone: [phone]                 My Care Plans  Self Management and Treatment Plan  Goals and (Comments)  Goals        General    #2 Medical (pt-stated)     Goal Statement: I want to prevent hospital visits  Date Goal set: 9/9/2020   Barriers: on chronic TPN, IV line  Strengths: home infusion, care  coordination  Date to Achieve By: 12/9/21  Patient expressed understanding of goal: yes  Action steps to achieve this goal:  1. I will complete IV antibiotics as prescribed. (completed)  2. I will follow up with infectious disease provider as scheduled 9/24/20. (completed)  3. I will stay at home during COVID-19 pandemic.   4. I will monitor my temperature daily as instructed and contact [facility] infusion for elevated temperature parameters.  5. I will have G-tube replaced as scheduled. (completed)             Action Plans on File:     Advance Care Plans/Directives Type:        My Medical and Care Information  Problem List   Patient Active Problem List   Diagnosis     Peptic ulcer disease     Gastric bypass status for obesity     Chronic abdominal pain     Vomiting     Anemia     ADHD (attention deficit hyperactivity disorder), inattentive type     Vitamin B12 deficiency without anemia     Thiamine deficiency     Dehydration     Dysphagia     Weight loss, non-intentional     Malnutrition (H)     Chronic anxiety     Constipation     Bile reflux esophagitis     Former smoker     Vitamin D deficiency     Iron deficiency     Health Care Home     Chronic pain     Insomnia     Positive blood culture     Fungemia     Chronic nausea     Gastrostomy tube in place (H)     Cardiomyopathy in nutritional diseases (H)     Short gut syndrome     Anxiety     Status post cervical spinal arthrodesis     Port or reservoir infection, initial encounter     Abnormal echocardiogram     Low serum iron     Anemia, iron deficiency     Catheter-related bloodstream infection (CRBSI)     Generalized weakness     S/P bariatric surgery     Hyperlipidemia LDL goal <130     Bacteremia     Infection by Candida species     PICC line infiltration, sequela     Gram-positive bacteremia     On total parenteral nutrition     Gastric outlet obstruction     Short bowel syndrome     Bloodstream infection associated with central venous catheter, initial  encounter     Fever, unknown origin      Current Medications and Allergies:  See printed Medication Report.    Care Coordination Start Date: 1/10/2019   Frequency of Care Coordination: monthly   Form Last Updated: 05/27/2021

## 2021-05-27 NOTE — PROGRESS NOTES
Clinic Care Coordination Contact  Care Team Conversations    Wife is requesting another referral to GI for possible colonoscopy.  She tried to schedule but said they do not have order.   Ok to wait until  returns.       Please review and advise.     Dianelys KHAN, RN, PHN, CCM  Primary Clinic Care Coordination    [facility]  Primary Care Clinics  [email or web address]   Office: [phone]  Employed by [employer]

## 2021-05-28 ENCOUNTER — HOME INFUSION (PRE-WILLOW HOME INFUSION) (OUTPATIENT)
Dept: PHARMACY | Facility: CLINIC | Age: 51
End: 2021-05-28

## 2021-05-28 DIAGNOSIS — E53.8 VITAMIN B12 DEFICIENCY (NON ANEMIC): ICD-10-CM

## 2021-05-28 PROCEDURE — 87040 BLOOD CULTURE FOR BACTERIA: CPT | Performed by: INTERNAL MEDICINE

## 2021-05-28 NOTE — TELEPHONE ENCOUNTER
Received call from patient requesting refill(s) of fentaNYL (DURAGESIC) 25 mcg/hr 72 hr patch and oxyCODONE (ROXICODONE) 5 MG/5ML solution    Last dispensed from pharmacy on 05/07/21 for both    Patient's last office/virtual visit by prescribing provider on 05/12/21  Next office/virtual appointment scheduled for 08/11/21    Last urine drug screen date 01/31/21  Current opioid agreement on file (completed within the last year) Yes Date of opioid agreement: 01/05/21    E-prescribe to pharmacy-[facility] [place] - [place], [place] - [address]     Will route to nursing pool for review and preparation of prescription(s).

## 2021-05-28 NOTE — TELEPHONE ENCOUNTER
Routed to MA pool to gather required information for opioid refill.    Below Mychart from pt  Refills have been requested for the following medications:         oxyCODONE (ROXICODONE) 5 MG/5ML solution [Patti Milligan MD]         fentaNYL (DURAGESIC) 25 mcg/hr 72 hr patch [Patti Milligan MD]     Preferred pharmacy: [place], [place] - [address] [place] [address]

## 2021-05-28 NOTE — TELEPHONE ENCOUNTER
Medication refill information reviewed.     Due date for  fentaNYL (DURAGESIC) 25 mcg/hr 72 hr patch and oxyCODONE (ROXICODONE) 5 MG/5ML solution is 06/08/21     Prescriptions prepped for review.     Will route to provider.

## 2021-06-01 ENCOUNTER — HOME INFUSION (PRE-WILLOW HOME INFUSION) (OUTPATIENT)
Dept: PHARMACY | Facility: CLINIC | Age: 51
End: 2021-06-01

## 2021-06-01 ENCOUNTER — MEDICAL CORRESPONDENCE (OUTPATIENT)
Dept: HEALTH INFORMATION MANAGEMENT | Facility: CLINIC | Age: 51
End: 2021-06-01

## 2021-06-01 ENCOUNTER — HOSPITAL ENCOUNTER (OUTPATIENT)
Dept: LAB | Facility: CLINIC | Age: 51
Discharge: HOME | End: 2021-06-01
Attending: SURGERY | Admitting: SURGERY
Payer: COMMERCIAL

## 2021-06-01 LAB
ALBUMIN SERPL-MCNC: 3.7 G/DL (ref 3.4–5)
ALP SERPL-CCNC: 63 U/L (ref 40–150)
ALT SERPL W P-5'-P-CCNC: 23 U/L (ref 0–70)
ANION GAP SERPL CALCULATED.3IONS-SCNC: 6 MMOL/L (ref 3–14)
AST SERPL W P-5'-P-CCNC: 15 U/L (ref 0–45)
BASOPHILS # BLD AUTO: 0 10E9/L (ref 0–0.2)
BASOPHILS NFR BLD AUTO: 0.5 %
BILIRUB SERPL-MCNC: 0.7 MG/DL (ref 0.2–1.3)
BUN SERPL-MCNC: 17 MG/DL (ref 7–30)
CALCIUM SERPL-MCNC: 8.2 MG/DL (ref 8.5–10.1)
CHLORIDE SERPL-SCNC: 110 MMOL/L (ref 94–109)
CO2 SERPL-SCNC: 26 MMOL/L (ref 20–32)
CREAT SERPL-MCNC: 0.6 MG/DL (ref 0.66–1.25)
DIFFERENTIAL METHOD BLD: ABNORMAL
EOSINOPHIL # BLD AUTO: 0.1 10E9/L (ref 0–0.7)
EOSINOPHIL NFR BLD AUTO: 1.6 %
ERYTHROCYTE [DISTWIDTH] IN BLOOD BY AUTOMATED COUNT: 13.6 % (ref 10–15)
GFR SERPL CREATININE-BSD FRML MDRD: >90 ML/MIN/{1.73_M2}
GLUCOSE SERPL-MCNC: 82 MG/DL (ref 70–99)
HCT VFR BLD AUTO: 36.3 % (ref 40–53)
HGB BLD-MCNC: 12.1 G/DL (ref 13.3–17.7)
IMM GRANULOCYTES # BLD: 0 10E9/L (ref 0–0.4)
IMM GRANULOCYTES NFR BLD: 0.3 %
LYMPHOCYTES # BLD AUTO: 1.9 10E9/L (ref 0.8–5.3)
LYMPHOCYTES NFR BLD AUTO: 26.1 %
MAGNESIUM SERPL-MCNC: 2.3 MG/DL (ref 1.6–2.3)
MCH RBC QN AUTO: 31.4 PG (ref 26.5–33)
MCHC RBC AUTO-ENTMCNC: 33.3 G/DL (ref 31.5–36.5)
MCV RBC AUTO: 94 FL (ref 78–100)
MONOCYTES # BLD AUTO: 0.9 10E9/L (ref 0–1.3)
MONOCYTES NFR BLD AUTO: 12 %
NEUTROPHILS # BLD AUTO: 4.3 10E9/L (ref 1.6–8.3)
NEUTROPHILS NFR BLD AUTO: 59.5 %
NRBC # BLD AUTO: 0 10*3/UL
NRBC BLD AUTO-RTO: 0 /100
PHOSPHATE SERPL-MCNC: 2.8 MG/DL (ref 2.5–4.5)
PLATELET # BLD AUTO: 174 10E9/L (ref 150–450)
POTASSIUM SERPL-SCNC: 3.5 MMOL/L (ref 3.4–5.3)
PROT SERPL-MCNC: 6.8 G/DL (ref 6.8–8.8)
RBC # BLD AUTO: 3.85 10E12/L (ref 4.4–5.9)
SODIUM SERPL-SCNC: 142 MMOL/L (ref 133–144)
WBC # BLD AUTO: 7.3 10E9/L (ref 4–11)

## 2021-06-01 PROCEDURE — 83735 ASSAY OF MAGNESIUM: CPT | Performed by: SURGERY

## 2021-06-01 PROCEDURE — 85025 COMPLETE CBC W/AUTO DIFF WBC: CPT | Mod: GZ | Performed by: SURGERY

## 2021-06-01 PROCEDURE — 84100 ASSAY OF PHOSPHORUS: CPT | Performed by: SURGERY

## 2021-06-01 PROCEDURE — 80053 COMPREHEN METABOLIC PANEL: CPT | Performed by: SURGERY

## 2021-06-01 RX ORDER — FENTANYL 25 UG/1
1 PATCH TRANSDERMAL
Qty: 15 PATCH | Refills: 0 | Status: SHIPPED | OUTPATIENT
Start: 2021-06-01 | End: 2021-06-28

## 2021-06-01 RX ORDER — OXYCODONE HCL 5 MG/5 ML
5-10 SOLUTION, ORAL ORAL 3 TIMES DAILY PRN
Qty: 900 ML | Refills: 0 | Status: SHIPPED | OUTPATIENT
Start: 2021-06-01 | End: 2021-06-28

## 2021-06-01 NOTE — TELEPHONE ENCOUNTER
Script Eprescribed to pharmacy  [place] Prescription Monitoring Program checked    Will send this to MA team to notify patient.    Signed Prescriptions:                        Disp   Refills    oxyCODONE (ROXICODONE) 5 MG/5ML solution   900 mL 0        Sig: Take 5-10 mLs (5-10 mg) by mouth 3 times daily as           needed for pain Max of 30mg/day. Put at least 4           hours between doses. Fill 06/06/21 to start           on/after 06/08/21. 30 day supply.  Authorizing Provider: BRANDYN JENKINS    fentaNYL (DURAGESIC) 25 mcg/hr 72 hr patch 15 pat*0        Sig: Place 1 patch onto the skin every 48 hours remove old           patch.  May fill 06/06/21 and start 06/08/21  Authorizing Provider: BRANDYN JENKINS MD  [facility]

## 2021-06-01 NOTE — TELEPHONE ENCOUNTER
Extend [employer] message sent with Rx approval from provider.  Stephon  Pain Clinic Management Team

## 2021-06-02 ENCOUNTER — HOME INFUSION (PRE-WILLOW HOME INFUSION) (OUTPATIENT)
Dept: PHARMACY | Facility: CLINIC | Age: 51
End: 2021-06-02

## 2021-06-02 RX ORDER — CYANOCOBALAMIN 1000 UG/ML
INJECTION, SOLUTION INTRAMUSCULAR; SUBCUTANEOUS
Qty: 1 ML | Refills: 3 | Status: SHIPPED | OUTPATIENT
Start: 2021-06-02 | End: 2022-11-01

## 2021-06-02 NOTE — PROGRESS NOTES
This is a recent snapshot of the patient's [facility] medical record.  For current drug dose and complete information and questions, call [phone] or In Basket pool, fv home infusion ([id number])  CSN Number:  [id number]

## 2021-06-02 NOTE — TELEPHONE ENCOUNTER
Prescription approved per [facility] Refill Protocol.    YADIRA PortilloN, RN  [facility]

## 2021-06-03 LAB
BACTERIA SPEC CULT: NO GROWTH
Lab: NORMAL
Lab: NORMAL
SPECIMEN SOURCE: NORMAL

## 2021-06-03 NOTE — PROGRESS NOTES
This is a recent snapshot of the patient's [facility] medical record.  For current drug dose and complete information and questions, call [phone] or In Basket pool, fv home infusion ([id number])  CSN Number:  [id number]

## 2021-06-07 NOTE — PROGRESS NOTES
Spoke with mother and she is okay with using the adapalene 0.3% gel and mixing with OTC benzoyl peroxide 2.5% gel. Sent script over for adapalene 0.3% gel to preferred pharmacy. Mother will call back if any problems obtaining medication.    This is a recent snapshot of the patient's [facility] medical record.  For current drug dose and complete information and questions, call [phone] or In Basket pool, fv home infusion ([id number])  CSN Number:  [id number]

## 2021-06-07 NOTE — PROGRESS NOTES
Minimally Invasive Surgery Progress Note  1/15/2018    24 hour events/Subjective:   - Afebrile overnight    - Eats few bites of food at a time, feels fullness and abdominal discomfort often   - States that he has not tolerated tube feeds in the past, reports that he had prior GJ   - Uses G tube (in remnant) for venting     O:  Temp:  [96.3  F (35.7  C)-97.4  F (36.3  C)] 96.3  F (35.7  C)  Pulse:  [56-60] 60  Resp:  [16] 16  BP: (104-130)/(65-82) 130/82  SpO2:  [97 %-100 %] 100 %  I/O last 3 completed shifts:  In: 560 [P.O.:60; I.V.:500]  Out: -     Gen: Alert & conversant.  Resp: Breathing non-labored on room air  CV: Regular rate; regular rhythm.  Abd: Soft, non-distended, non-tender, well healed surgical scars, G tube site CDI without erythema.  Ext: Warm and well perfused    BMP  Recent Labs  Lab 01/15/18  0801 01/14/18  0643 01/13/18  0923 01/12/18  2323   * 146* 143 140   POTASSIUM 3.4 3.5 3.2* 3.0*   CHLORIDE 112* 112* 110* 108   SILAS 8.2* 7.8* 8.0* 7.9*   CO2 27 28 25 25   BUN 8 11 17 21   CR 0.62* 0.71 0.84 1.05   * 83 78 88     CBC  Recent Labs  Lab 01/15/18  0801 01/14/18  0643 01/13/18  0923 01/12/18  2323   WBC 6.9 6.3 11.3* 12.4*   RBC 3.96* 3.71* 4.03* 4.05*   HGB 12.2* 11.4* 12.7* 12.8*   HCT 37.7* 35.4* 39.1* 38.6*   MCV 95 95 97 95   MCH 30.8 30.7 31.5 31.6   MCHC 32.4 32.2 32.5 33.2   RDW 14.2 14.4 14.5 14.4   * 77* 79* 76*     INR  Recent Labs  Lab 01/15/18  0801 01/14/18  0643 01/12/18  2323   INR 1.08 1.12 1.21*        A/P: Parker Richflorentino Sr is a 47 year old male with hx of RYGB s/p multiple revisions, G tube in remnant for venting, and 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 This is his 5th port since 3/2016 due to prior line infections. IR planning to remove port today.     - Would continue to encourage PO intake, however if not receiving enough nutritional needs independently, will need support either via  enteral or parenteral route once safe. Would prefer enteral route if possible, and discussed considering conversion of G to GJ tube. Patient would like time to consider this option.   - If patient is agreeable to GJ tube, would obtain upper GI study with small bowel follow through to rule out distal obstruction. If negative, would switch to GJ tube and start tube feedings via J limb.   - Agree with nutrition consult and calorie counts.    Discussed & seen with chief resident, who will discuss with staff.  --    Gillian Dominguez MD  General Surgery PGY-2  [id number]           English

## 2021-06-08 ENCOUNTER — HOME INFUSION (PRE-WILLOW HOME INFUSION) (OUTPATIENT)
Dept: PHARMACY | Facility: CLINIC | Age: 51
End: 2021-06-08

## 2021-06-08 ENCOUNTER — VIRTUAL VISIT (OUTPATIENT)
Dept: INTERNAL MEDICINE | Facility: CLINIC | Age: 51
End: 2021-06-08
Payer: COMMERCIAL

## 2021-06-08 DIAGNOSIS — Z78.9 ON TOTAL PARENTERAL NUTRITION: ICD-10-CM

## 2021-06-08 DIAGNOSIS — Z98.84 BARIATRIC SURGERY STATUS: Primary | ICD-10-CM

## 2021-06-08 DIAGNOSIS — F90.0 ADHD (ATTENTION DEFICIT HYPERACTIVITY DISORDER), INATTENTIVE TYPE: ICD-10-CM

## 2021-06-08 DIAGNOSIS — Z93.4 GASTROJEJUNOSTOMY TUBE STATUS (H): ICD-10-CM

## 2021-06-08 DIAGNOSIS — Z98.84 GASTRIC BYPASS STATUS FOR OBESITY: ICD-10-CM

## 2021-06-08 DIAGNOSIS — A49.1 STREPTOCOCCUS BOVIS INFECTION: ICD-10-CM

## 2021-06-08 PROCEDURE — 99214 OFFICE O/P EST MOD 30 MIN: CPT | Mod: 95 | Performed by: INTERNAL MEDICINE

## 2021-06-08 NOTE — PROGRESS NOTES
Parker is a 50 year old who is being evaluated via a billable telephone visit.      What phone number would you like to be contacted at? [phone]  How would you like to obtain your AVS? MyChart    Assessment & Plan     Bariatric surgery status  Patient is status post bariatric surgery unable to eat now on TPN, stable.  - GASTROENTEROLOGY ADULT REF PROCEDURE ONLY; Future    Gastric bypass status for obesity  Gastric bypass status is stable.  - GASTROENTEROLOGY ADULT REF PROCEDURE ONLY; Future    Streptococcus bovis infection  She was in the hospital and had a strep bovis infection.  Infectious disease recommended he get a colonoscopy he has no colon cancer in his family but he is 50.  We will get him set up for this he had a little bit of abdominal pain and changes in bowel movements.  She does not really eat all of his nutrition is by TPN.  Therefore does not much coming through.  He will try to do the prep but gets a lot of bloating when anything goes and orally.  - GASTROENTEROLOGY ADULT REF PROCEDURE ONLY; Future    On total parenteral nutrition  Chronic TPN stable    ADHD (attention deficit hyperactivity disorder), inattentive type  ADHD patient is on Adderall doing well we will continue to fill he is negative no sign of abuse.  He has chronic narcotics from the pain clinic.      Review of prior external note(s) from - infectious disease         Tobacco Cessation:   reports that he has been smoking cigarettes. He has never used smokeless tobacco.  Tobacco Cessation Action Plan: Information offered: Patient not interested at this time    recommended to him to get Covid vaccination for the patient and he agrees to get it, we will call him with the appointment time.    No follow-ups on file.    Chuy Isaac MD  [facility]   Parker is a 50 year old who presents for the following health issues     HPI     Chief Complaint   Patient presents with     Telephone     recheck  "medications and discuss colonoscopy     He is doing ok,     Had a visit with cardiology and they increased her coreg to 12.5 mg bid.  BP was going up.     Having a few BM, liquid and odor is different. More lower abd pain.  Grew out Strep bovis so concern for colon cancer.  Has some inguinal area lymph node swelling.  Never has had a colonoscopy, no colon cancer in his family.      Otherwise doing ok,    Still smoking 3 cig a day. Wants to quit.     Narcotics are from pain clinic.  Back to ativan 1mg at night, stable dose.      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     Complication of anesthesia      Difficulty swallowing      Gastric ulcer, unspecified as acute or chronic, without mention of hemorrhage, perforation, or obstruction      Gastro-oesophageal reflux disease      Head injury      Hiatal hernia      Other bladder disorder      Other chronic pain      PONV (postoperative nausea and vomiting)      Severe malnutrition (H)     TPN     Short gut syndrome      Tobacco abuse      Current Outpatient Medications   Medication     acetaminophen (TYLENOL) 32 mg/mL liquid     alteplase 2 mg/2 mL (in 10 mL syringe)     amphetamine-dextroamphetamine (ADDERALL) 20 MG tablet     amphetamine-dextroamphetamine (ADDERALL) 20 MG tablet     carvedilol (COREG) 6.25 MG tablet     cyanocobalamin (CYANOCOBALAMIN) 1000 MCG/ML injection     [facility] INFUSION MANAGED PATIENT     fentaNYL (DURAGESIC) 25 mcg/hr 72 hr patch     insulin syringe-needle U-100 (29G X 1/2\" 1 ML) 29G X 1/2\" 1 ML miscellaneous     LORazepam (ATIVAN) 1 MG tablet     nystatin (MYCOSTATIN) 371553 UNIT/GM external cream     ondansetron (ZOFRAN-ODT) 8 MG ODT tab     oxyCODONE (ROXICODONE) 5 " MG/5ML solution     pantoprazole (PROTONIX) 40 MG EC tablet     parenteral nutrition - PTA/DISCHARGE ORDER     sodium chloride 0.45% SOLN BOLUS     sucralfate (CARAFATE) 1 GM/10ML suspension     VENTOLIN  (90 Base) MCG/ACT inhaler     vitamin D2 (ERGOCALCIFEROL) 67547 units (1250 mcg) capsule     diphenhydrAMINE (BENADRYL) 12.5 MG/5ML syrup     EPINEPHrine (ANY BX GENERIC EQUIV) 0.3 MG/0.3ML injection 2-pack     Lidocaine (LIDOCARE) 4 % Patch     lidocaine 7.5 mL, alum & mag hydroxide-simethicone 7.5 mL GI Cocktail     naloxone (NARCAN) 4 MG/0.1ML nasal spray     nicotine (NICODERM CQ) 21 MG/24HR 24 hr patch     polyethylene glycol (MIRALAX) 17 g packet     No current facility-administered medications for this visit.      Social History     Tobacco Use     Smoking status: Current Some Day Smoker     Types: Cigarettes     Smokeless tobacco: Never Used     Tobacco comment: 2/4/2021    smokes 3 cigarettes/day   Substance Use Topics     Alcohol use: No     Frequency: Never     Drinks per session: Patient refused     Binge frequency: Never     Comment: quit 2002     Drug use: No         Review of Systems         Objective           Vitals:  No vitals were obtained today due to virtual visit.    Physical Exam   healthy, alert and no distress  PSYCH: Alert and oriented times 3; coherent speech, normal   rate and volume, able to articulate logical thoughts, able   to abstract reason, no tangential thoughts, no hallucinations   or delusions  His affect is normal  RESP: No cough, no audible wheezing, able to talk in full sentences  Remainder of exam unable to be completed due to telephone visits                Phone call duration: 12 minutes

## 2021-06-09 ENCOUNTER — HOSPITAL ENCOUNTER (OUTPATIENT)
Dept: LAB | Facility: CLINIC | Age: 51
Discharge: HOME | End: 2021-06-09
Attending: SURGERY | Admitting: SURGERY
Payer: COMMERCIAL

## 2021-06-09 ENCOUNTER — MEDICAL CORRESPONDENCE (OUTPATIENT)
Dept: HEALTH INFORMATION MANAGEMENT | Facility: CLINIC | Age: 51
End: 2021-06-09

## 2021-06-09 LAB
ALBUMIN SERPL-MCNC: 3.7 G/DL (ref 3.4–5)
ALP SERPL-CCNC: 73 U/L (ref 40–150)
ALT SERPL W P-5'-P-CCNC: 40 U/L (ref 0–70)
ANION GAP SERPL CALCULATED.3IONS-SCNC: 3 MMOL/L (ref 3–14)
AST SERPL W P-5'-P-CCNC: 21 U/L (ref 0–45)
BASOPHILS # BLD AUTO: 0.1 10E9/L (ref 0–0.2)
BASOPHILS NFR BLD AUTO: 0.7 %
BILIRUB SERPL-MCNC: 0.5 MG/DL (ref 0.2–1.3)
BUN SERPL-MCNC: 15 MG/DL (ref 7–30)
CALCIUM SERPL-MCNC: 8.6 MG/DL (ref 8.5–10.1)
CHLORIDE SERPL-SCNC: 107 MMOL/L (ref 94–109)
CO2 SERPL-SCNC: 31 MMOL/L (ref 20–32)
CREAT SERPL-MCNC: 0.76 MG/DL (ref 0.66–1.25)
CRP SERPL-MCNC: <2.9 MG/L (ref 0–8)
DIFFERENTIAL METHOD BLD: ABNORMAL
EOSINOPHIL # BLD AUTO: 0.3 10E9/L (ref 0–0.7)
EOSINOPHIL NFR BLD AUTO: 3.5 %
ERYTHROCYTE [DISTWIDTH] IN BLOOD BY AUTOMATED COUNT: 13.9 % (ref 10–15)
FERRITIN SERPL-MCNC: 17 NG/ML (ref 26–388)
GFR SERPL CREATININE-BSD FRML MDRD: >90 ML/MIN/{1.73_M2}
GLUCOSE SERPL-MCNC: 93 MG/DL (ref 70–99)
HCT VFR BLD AUTO: 40.6 % (ref 40–53)
HGB BLD-MCNC: 13.3 G/DL (ref 13.3–17.7)
IMM GRANULOCYTES # BLD: 0 10E9/L (ref 0–0.4)
IMM GRANULOCYTES NFR BLD: 0.3 %
LYMPHOCYTES # BLD AUTO: 1.7 10E9/L (ref 0.8–5.3)
LYMPHOCYTES NFR BLD AUTO: 23.2 %
MAGNESIUM SERPL-MCNC: 2.3 MG/DL (ref 1.6–2.3)
MCH RBC QN AUTO: 31.5 PG (ref 26.5–33)
MCHC RBC AUTO-ENTMCNC: 32.8 G/DL (ref 31.5–36.5)
MCV RBC AUTO: 96 FL (ref 78–100)
MONOCYTES # BLD AUTO: 0.7 10E9/L (ref 0–1.3)
MONOCYTES NFR BLD AUTO: 9.6 %
NEUTROPHILS # BLD AUTO: 4.7 10E9/L (ref 1.6–8.3)
NEUTROPHILS NFR BLD AUTO: 62.7 %
NRBC # BLD AUTO: 0 10*3/UL
NRBC BLD AUTO-RTO: 0 /100
PHOSPHATE SERPL-MCNC: 3.8 MG/DL (ref 2.5–4.5)
PLATELET # BLD AUTO: 169 10E9/L (ref 150–450)
POTASSIUM SERPL-SCNC: 3.9 MMOL/L (ref 3.4–5.3)
PROT SERPL-MCNC: 7.1 G/DL (ref 6.8–8.8)
RBC # BLD AUTO: 4.22 10E12/L (ref 4.4–5.9)
SODIUM SERPL-SCNC: 141 MMOL/L (ref 133–144)
WBC # BLD AUTO: 7.5 10E9/L (ref 4–11)

## 2021-06-09 PROCEDURE — 85025 COMPLETE CBC W/AUTO DIFF WBC: CPT | Mod: GZ | Performed by: SURGERY

## 2021-06-09 PROCEDURE — 84255 ASSAY OF SELENIUM: CPT | Mod: GZ | Performed by: SURGERY

## 2021-06-09 PROCEDURE — 83735 ASSAY OF MAGNESIUM: CPT | Performed by: SURGERY

## 2021-06-09 PROCEDURE — 84630 ASSAY OF ZINC: CPT | Mod: GZ | Performed by: SURGERY

## 2021-06-09 PROCEDURE — 86140 C-REACTIVE PROTEIN: CPT | Performed by: SURGERY

## 2021-06-09 PROCEDURE — 82525 ASSAY OF COPPER: CPT | Mod: GZ | Performed by: SURGERY

## 2021-06-09 PROCEDURE — 84100 ASSAY OF PHOSPHORUS: CPT | Performed by: SURGERY

## 2021-06-09 PROCEDURE — 82728 ASSAY OF FERRITIN: CPT | Mod: GZ | Performed by: SURGERY

## 2021-06-09 PROCEDURE — 80053 COMPREHEN METABOLIC PANEL: CPT | Performed by: SURGERY

## 2021-06-09 NOTE — PROGRESS NOTES
This is a recent snapshot of the patient's [facility] medical record.  For current drug dose and complete information and questions, call [phone] or In Basket pool, fv home infusion ([id number])  CSN Number:  [id number]

## 2021-06-10 ENCOUNTER — HOME INFUSION (PRE-WILLOW HOME INFUSION) (OUTPATIENT)
Dept: PHARMACY | Facility: CLINIC | Age: 51
End: 2021-06-10

## 2021-06-13 LAB
COPPER SERPL-MCNC: 107.7 UG/DL (ref 70–140)
SELENIUM SERPL-MCNC: 118.3 UG/L (ref 23–190)
ZINC SERPL-MCNC: 77.7 UG/DL (ref 60–120)

## 2021-06-14 ENCOUNTER — HOSPITAL ENCOUNTER (OUTPATIENT)
Facility: AMBULATORY SURGERY CENTER | Age: 51
End: 2021-06-14
Attending: STUDENT IN AN ORGANIZED HEALTH CARE EDUCATION/TRAINING PROGRAM
Payer: COMMERCIAL

## 2021-06-14 ENCOUNTER — TELEPHONE (OUTPATIENT)
Dept: GASTROENTEROLOGY | Facility: OUTPATIENT CENTER | Age: 51
End: 2021-06-14

## 2021-06-14 ENCOUNTER — HOSPITAL ENCOUNTER (OUTPATIENT)
Dept: LAB | Facility: CLINIC | Age: 51
Discharge: HOME | End: 2021-06-14
Attending: SURGERY | Admitting: SURGERY
Payer: COMMERCIAL

## 2021-06-14 ENCOUNTER — MEDICAL CORRESPONDENCE (OUTPATIENT)
Dept: HEALTH INFORMATION MANAGEMENT | Facility: CLINIC | Age: 51
End: 2021-06-14

## 2021-06-14 DIAGNOSIS — R05.8 PRODUCTIVE COUGH: ICD-10-CM

## 2021-06-14 DIAGNOSIS — Z11.59 ENCOUNTER FOR SCREENING FOR OTHER VIRAL DISEASES: ICD-10-CM

## 2021-06-14 LAB
ALBUMIN SERPL-MCNC: 3.6 G/DL (ref 3.4–5)
ALP SERPL-CCNC: 66 U/L (ref 40–150)
ALT SERPL W P-5'-P-CCNC: 23 U/L (ref 0–70)
ANION GAP SERPL CALCULATED.3IONS-SCNC: 3 MMOL/L (ref 3–14)
AST SERPL W P-5'-P-CCNC: 18 U/L (ref 0–45)
BASOPHILS # BLD AUTO: 0.1 10E9/L (ref 0–0.2)
BASOPHILS NFR BLD AUTO: 0.7 %
BILIRUB SERPL-MCNC: 0.5 MG/DL (ref 0.2–1.3)
BUN SERPL-MCNC: 17 MG/DL (ref 7–30)
CALCIUM SERPL-MCNC: 8.5 MG/DL (ref 8.5–10.1)
CHLORIDE SERPL-SCNC: 106 MMOL/L (ref 94–109)
CO2 SERPL-SCNC: 31 MMOL/L (ref 20–32)
CREAT SERPL-MCNC: 0.82 MG/DL (ref 0.66–1.25)
CRP SERPL-MCNC: <2.9 MG/L (ref 0–8)
DIFFERENTIAL METHOD BLD: ABNORMAL
EOSINOPHIL # BLD AUTO: 0.2 10E9/L (ref 0–0.7)
EOSINOPHIL NFR BLD AUTO: 3 %
ERYTHROCYTE [DISTWIDTH] IN BLOOD BY AUTOMATED COUNT: 13.7 % (ref 10–15)
FERRITIN SERPL-MCNC: 13 NG/ML (ref 26–388)
GFR SERPL CREATININE-BSD FRML MDRD: >90 ML/MIN/{1.73_M2}
GLUCOSE SERPL-MCNC: 80 MG/DL (ref 70–99)
HCT VFR BLD AUTO: 37 % (ref 40–53)
HGB BLD-MCNC: 12 G/DL (ref 13.3–17.7)
IMM GRANULOCYTES # BLD: 0 10E9/L (ref 0–0.4)
IMM GRANULOCYTES NFR BLD: 0.1 %
LYMPHOCYTES # BLD AUTO: 2.5 10E9/L (ref 0.8–5.3)
LYMPHOCYTES NFR BLD AUTO: 31.1 %
MAGNESIUM SERPL-MCNC: 2.1 MG/DL (ref 1.6–2.3)
MCH RBC QN AUTO: 31.5 PG (ref 26.5–33)
MCHC RBC AUTO-ENTMCNC: 32.4 G/DL (ref 31.5–36.5)
MCV RBC AUTO: 97 FL (ref 78–100)
MONOCYTES # BLD AUTO: 1.2 10E9/L (ref 0–1.3)
MONOCYTES NFR BLD AUTO: 14.4 %
NEUTROPHILS # BLD AUTO: 4.1 10E9/L (ref 1.6–8.3)
NEUTROPHILS NFR BLD AUTO: 50.7 %
NRBC # BLD AUTO: 0 10*3/UL
NRBC BLD AUTO-RTO: 0 /100
PHOSPHATE SERPL-MCNC: 4.1 MG/DL (ref 2.5–4.5)
PLATELET # BLD AUTO: 158 10E9/L (ref 150–450)
POTASSIUM SERPL-SCNC: 4 MMOL/L (ref 3.4–5.3)
PROT SERPL-MCNC: 6.7 G/DL (ref 6.8–8.8)
RBC # BLD AUTO: 3.81 10E12/L (ref 4.4–5.9)
SODIUM SERPL-SCNC: 140 MMOL/L (ref 133–144)
WBC # BLD AUTO: 8.1 10E9/L (ref 4–11)

## 2021-06-14 PROCEDURE — 84255 ASSAY OF SELENIUM: CPT | Performed by: SURGERY

## 2021-06-14 PROCEDURE — 82525 ASSAY OF COPPER: CPT | Mod: GZ | Performed by: SURGERY

## 2021-06-14 PROCEDURE — 84630 ASSAY OF ZINC: CPT | Performed by: SURGERY

## 2021-06-14 PROCEDURE — 86140 C-REACTIVE PROTEIN: CPT | Performed by: SURGERY

## 2021-06-14 PROCEDURE — 85025 COMPLETE CBC W/AUTO DIFF WBC: CPT | Mod: GZ | Performed by: SURGERY

## 2021-06-14 PROCEDURE — 80053 COMPREHEN METABOLIC PANEL: CPT | Performed by: SURGERY

## 2021-06-14 PROCEDURE — 83785 ASSAY OF MANGANESE: CPT | Mod: GZ | Performed by: SURGERY

## 2021-06-14 PROCEDURE — 82728 ASSAY OF FERRITIN: CPT | Performed by: SURGERY

## 2021-06-14 PROCEDURE — 83735 ASSAY OF MAGNESIUM: CPT | Performed by: SURGERY

## 2021-06-14 PROCEDURE — 84100 ASSAY OF PHOSPHORUS: CPT | Performed by: SURGERY

## 2021-06-14 NOTE — TELEPHONE ENCOUNTER
Screening Questions  1. What insurance is in the chart? BCBS    2.  Ordering/Referring Provider: PCP    3. BMI 27.3    4. Are you on daily home oxygen? NO    5. Do you have a history of difficult airway? NO    6. Have you had a heart, lung, or liver transplant? NO    7. Are you currently on dialysis? NO    8. Have you had a stroke or Transient ischemic atttack (TIA) within 6 months? NO    9. In the past 6 months, have you had any heart related issues including cardiomyopathy or heart attack?         If yes, did it require cardiac stenting or other implantable device?NO    10. Do you have any implantable devices in your body (pacemaker, defib, LVAD)? NO    11. Do you take nitroglycerin? If yes, how often? NO    12. Are you currently taking any blood thinners?NO    13. Are you a diabetic? NO    14. (Females) Are you currently pregnant? NO  If yes, how many weeks?    15. Have you had a procedure in the past that was difficult to tolerate with conscious sedation? Any allergies to Fentanyl or Versed NO    16. Are you taking any scheduled prescription narcotics more than once daily? YES, OXYCODONE AND FENTANYL PATCH    17. Do you have any chemical dependencies such as alcohol, street drugs, or methadone? NO    18. Do you have any history of post-traumatic stress syndrome or mental health issues? NO    19. Do you transfer independently? YES    20.  Do you have any issues with constipation? NO    21. Preferred Pharmacy for Pre Prescription XX    Scheduling Details    Procedure Scheduled: COLONOCSOPY  Provider/Surgeon: SILVIA  Date of Procedure: 7/8  Location: [facility]  Caller (Please ask for phone number if not scheduled by patient): WIFE      Sedation Type: CS  Conscious Sedation- Needs  for 6 hours after the procedure  MAC/General-Needs  for 24 hours after procedure    Pre-op Required at [facility] and OR for  MAC sedation:   (if yes advise patient they will need a pre-op prior to procedure)      Is patient on  blood thinners? -NO (If yes- inform patient to follow up with PCP or provider for follow up instructions)     Informed patient they will need an adult  YES  Cannot take any type of public or medical transportation alone    Informed Patient of COVID Test Requirement YES    Confirmed Nurse will call to complete assessment YES    Additional comments: NONE

## 2021-06-14 NOTE — PROGRESS NOTES
This is a recent snapshot of the patient's [facility] medical record.  For current drug dose and complete information and questions, call [phone] or In Basket pool, fv home infusion ([id number])  CSN Number:  [id number]

## 2021-06-15 ENCOUNTER — TELEPHONE (OUTPATIENT)
Dept: ENDOCRINOLOGY | Facility: CLINIC | Age: 51
End: 2021-06-15

## 2021-06-15 NOTE — TELEPHONE ENCOUNTER
Wife Rose called to say she would needs to have Parker's G-tube replaced with a [place] tube. Has appointment scheduled to see Dr. Vyas on 7/8. She hopes this can be done in the clinic setting. I sent a message to Rika MOYER RN who states she will discuss with Dr. Vyas on Thursday when he is in clinic. Left message today on patient's home phone of above.

## 2021-06-15 NOTE — PROGRESS NOTES
This is a recent snapshot of the patient's [facility] medical record.  For current drug dose and complete information and questions, call [phone] or In Basket pool, fv home infusion ([id number])  CSN Number:  [id number]

## 2021-06-16 ENCOUNTER — HOME INFUSION (PRE-WILLOW HOME INFUSION) (OUTPATIENT)
Dept: PHARMACY | Facility: CLINIC | Age: 51
End: 2021-06-16

## 2021-06-16 LAB — MANGANESE BLD-MCNC: 7.4 UG/L (ref 4.2–16.5)

## 2021-06-16 RX ORDER — ALBUTEROL SULFATE 90 UG/1
AEROSOL, METERED RESPIRATORY (INHALATION)
Qty: 18 G | Refills: 1 | Status: SHIPPED | OUTPATIENT
Start: 2021-06-16 | End: 2021-12-15

## 2021-06-16 NOTE — TELEPHONE ENCOUNTER
Patient has been seen in the past 30 days, 6/8/21, Dr. Isaac  Routing to PCP to advise.  CHRISTY Hurst

## 2021-06-17 LAB
COPPER SERPL-MCNC: 119.2 UG/DL (ref 70–140)
SELENIUM SERPL-MCNC: 115.3 UG/L (ref 23–190)
ZINC SERPL-MCNC: 72.7 UG/DL (ref 60–120)

## 2021-06-17 NOTE — PROGRESS NOTES
This is a recent snapshot of the patient's [facility] medical record.  For current drug dose and complete information and questions, call [phone] or In Basket pool, fv home infusion ([id number])  CSN Number:  [id number]

## 2021-06-18 ENCOUNTER — TELEPHONE (OUTPATIENT)
Dept: INTERNAL MEDICINE | Facility: CLINIC | Age: 51
End: 2021-06-18

## 2021-06-18 RX ORDER — QUETIAPINE FUMARATE 25 MG/1
25 TABLET, FILM COATED ORAL AT BEDTIME
COMMUNITY
End: 2021-11-26

## 2021-06-18 RX ORDER — POLYETHYLENE GLYCOL 3350 17 G
2 POWDER IN PACKET (EA) ORAL
COMMUNITY
End: 2021-11-26

## 2021-06-18 RX ORDER — ALBUTEROL SULFATE 90 UG/1
2 AEROSOL, METERED RESPIRATORY (INHALATION) EVERY 6 HOURS PRN
COMMUNITY
End: 2022-06-21

## 2021-06-18 NOTE — PROGRESS NOTES
This is a recent snapshot of the patient's [facility] medical record.  For current drug dose and complete information and questions, call [phone] or In [facility] pool, fv home infusion ([id number])  CSN Number:  [id number]

## 2021-06-18 NOTE — PROGRESS NOTES
This is a recent snapshot of the patient's [facility] medical record.  For current drug dose and complete information and questions, call [phone] or In Basket pool, fv home infusion ([id number])  CSN Number:  [id number]

## 2021-06-18 NOTE — TELEPHONE ENCOUNTER
Reason for Call:  Form, our goal is to have forms completed with 72 hours, however, some forms may require a visit or additional information.    Type of letter, form or note:  Home Health Certification    Who is the form from?: Home care    Where did the form come from: form was faxed in    What clinic location was the form placed at?: [facility]     Where the form was placed: Given to MA/RN    What number is listed as a contact on the form?:        Additional comments:     Call taken on 6/18/2021 at 1:25 PM by Teetee Parada

## 2021-06-20 ENCOUNTER — MYC REFILL (OUTPATIENT)
Dept: INTERNAL MEDICINE | Facility: CLINIC | Age: 51
End: 2021-06-20

## 2021-06-20 DIAGNOSIS — F90.0 ADHD (ATTENTION DEFICIT HYPERACTIVITY DISORDER), INATTENTIVE TYPE: ICD-10-CM

## 2021-06-21 ENCOUNTER — MYC MEDICAL ADVICE (OUTPATIENT)
Dept: INTERNAL MEDICINE | Facility: CLINIC | Age: 51
End: 2021-06-21

## 2021-06-21 DIAGNOSIS — Z53.9 DIAGNOSIS NOT YET DEFINED: Primary | ICD-10-CM

## 2021-06-21 PROCEDURE — G0179 MD RECERTIFICATION HHA PT: HCPCS | Performed by: INTERNAL MEDICINE

## 2021-06-21 RX ORDER — DEXTROAMPHETAMINE SACCHARATE, AMPHETAMINE ASPARTATE, DEXTROAMPHETAMINE SULFATE AND AMPHETAMINE SULFATE 5; 5; 5; 5 MG/1; MG/1; MG/1; MG/1
TABLET ORAL
Qty: 30 TABLET | Refills: 0 | Status: SHIPPED | OUTPATIENT
Start: 2021-06-21 | End: 2021-07-19

## 2021-06-21 RX ORDER — LORAZEPAM 1 MG/1
TABLET ORAL
Qty: 30 TABLET | Refills: 0 | Status: SHIPPED | OUTPATIENT
Start: 2021-06-21 | End: 2021-07-19

## 2021-06-21 NOTE — TELEPHONE ENCOUNTER
Requested Prescriptions   Pending Prescriptions Disp Refills     LORazepam (ATIVAN) 1 MG tablet 30 tablet 0     Sig: TAKE 1 TABLET (1MG) BY MOUTH AS NEEDED FOR ANXIETY WITH TPN AND MEDICATIONS . JUST ONCE A DAY (30 TO LAST 30 DAYS)     Last Written Prescription Date:  05/21/2021  Last Fill Quantity: 30,   # refills: 0  Last Office Visit: 06/08/2021  Future Office visit:    Next 5 appointments (look out 90 days)    Aug 11, 2021 12:45 PM  Return Visit with Patti Milligan MD  [facility] ([facility] ) [address] [address]  [phone]           Routing refill request to provider for review/approval because:  Drug not on the [facility] or  Health refill protocol or controlled substance              amphetamine-dextroamphetamine (ADDERALL) 20 MG tablet 30 tablet 0     Sig: TAKE ONE TABLET BY MOUTH ONCE DAILY     Last Written Prescription Date:  05/24/2021  Last Fill Quantity: 30,   # refills: 0  Last Office Visit: 06/08/2021  Future Office visit:    Next 5 appointments (look out 90 days)    Aug 11, 2021 12:45 PM  Return Visit with Patti Milligan MD  [facility] ([facility] ) [address] [address]  [phone]           Routing refill request to provider for review/approval because:  Drug not on the [facility] or [facility] refill protocol or controlled substance

## 2021-06-24 NOTE — PROGRESS NOTES
This is a recent snapshot of the patient's [facility] medical record.  For current drug dose and complete information and questions, call [phone] or In Basket pool, fv home infusion ([id number])  CSN Number:  [id number]

## 2021-06-28 ENCOUNTER — MYC REFILL (OUTPATIENT)
Dept: PALLIATIVE MEDICINE | Facility: CLINIC | Age: 51
End: 2021-06-28

## 2021-06-28 DIAGNOSIS — G89.29 CHRONIC ABDOMINAL PAIN: ICD-10-CM

## 2021-06-28 DIAGNOSIS — G89.4 CHRONIC PAIN SYNDROME: ICD-10-CM

## 2021-06-28 DIAGNOSIS — R10.9 CHRONIC ABDOMINAL PAIN: ICD-10-CM

## 2021-06-29 ENCOUNTER — HOSPITAL ENCOUNTER (OUTPATIENT)
Dept: LAB | Facility: CLINIC | Age: 51
Discharge: HOME | End: 2021-06-29
Attending: SURGERY | Admitting: SURGERY
Payer: COMMERCIAL

## 2021-06-29 ENCOUNTER — MEDICAL CORRESPONDENCE (OUTPATIENT)
Dept: HEALTH INFORMATION MANAGEMENT | Facility: CLINIC | Age: 51
End: 2021-06-29

## 2021-06-29 LAB
ALBUMIN SERPL-MCNC: 3.6 G/DL (ref 3.4–5)
ALP SERPL-CCNC: 61 U/L (ref 40–150)
ALT SERPL W P-5'-P-CCNC: 24 U/L (ref 0–70)
ANION GAP SERPL CALCULATED.3IONS-SCNC: 4 MMOL/L (ref 3–14)
AST SERPL W P-5'-P-CCNC: 17 U/L (ref 0–45)
BASOPHILS # BLD AUTO: 0.1 10E9/L (ref 0–0.2)
BASOPHILS NFR BLD AUTO: 1 %
BILIRUB SERPL-MCNC: 0.7 MG/DL (ref 0.2–1.3)
BUN SERPL-MCNC: 11 MG/DL (ref 7–30)
CALCIUM SERPL-MCNC: 8.3 MG/DL (ref 8.5–10.1)
CHLORIDE SERPL-SCNC: 111 MMOL/L (ref 94–109)
CO2 SERPL-SCNC: 28 MMOL/L (ref 20–32)
CREAT SERPL-MCNC: 0.69 MG/DL (ref 0.66–1.25)
CRP SERPL-MCNC: <2.9 MG/L (ref 0–8)
DIFFERENTIAL METHOD BLD: ABNORMAL
EOSINOPHIL # BLD AUTO: 0.2 10E9/L (ref 0–0.7)
EOSINOPHIL NFR BLD AUTO: 2.9 %
ERYTHROCYTE [DISTWIDTH] IN BLOOD BY AUTOMATED COUNT: 13.6 % (ref 10–15)
FERRITIN SERPL-MCNC: 26 NG/ML (ref 26–388)
GFR SERPL CREATININE-BSD FRML MDRD: >90 ML/MIN/{1.73_M2}
GLUCOSE SERPL-MCNC: 96 MG/DL (ref 70–99)
HCT VFR BLD AUTO: 37.4 % (ref 40–53)
HGB BLD-MCNC: 12.1 G/DL (ref 13.3–17.7)
IMM GRANULOCYTES # BLD: 0 10E9/L (ref 0–0.4)
IMM GRANULOCYTES NFR BLD: 0.1 %
LYMPHOCYTES # BLD AUTO: 2.7 10E9/L (ref 0.8–5.3)
LYMPHOCYTES NFR BLD AUTO: 38.5 %
MAGNESIUM SERPL-MCNC: 2.2 MG/DL (ref 1.6–2.3)
MCH RBC QN AUTO: 31 PG (ref 26.5–33)
MCHC RBC AUTO-ENTMCNC: 32.4 G/DL (ref 31.5–36.5)
MCV RBC AUTO: 96 FL (ref 78–100)
MONOCYTES # BLD AUTO: 0.9 10E9/L (ref 0–1.3)
MONOCYTES NFR BLD AUTO: 12.7 %
NEUTROPHILS # BLD AUTO: 3.1 10E9/L (ref 1.6–8.3)
NEUTROPHILS NFR BLD AUTO: 44.8 %
NRBC # BLD AUTO: 0 10*3/UL
NRBC BLD AUTO-RTO: 0 /100
PHOSPHATE SERPL-MCNC: 3.2 MG/DL (ref 2.5–4.5)
PLATELET # BLD AUTO: 178 10E9/L (ref 150–450)
POTASSIUM SERPL-SCNC: 3.5 MMOL/L (ref 3.4–5.3)
PROT SERPL-MCNC: 6.7 G/DL (ref 6.8–8.8)
RBC # BLD AUTO: 3.9 10E12/L (ref 4.4–5.9)
SODIUM SERPL-SCNC: 143 MMOL/L (ref 133–144)
WBC # BLD AUTO: 6.9 10E9/L (ref 4–11)

## 2021-06-29 PROCEDURE — 83735 ASSAY OF MAGNESIUM: CPT | Performed by: SURGERY

## 2021-06-29 PROCEDURE — 82728 ASSAY OF FERRITIN: CPT | Mod: GZ | Performed by: SURGERY

## 2021-06-29 PROCEDURE — 82525 ASSAY OF COPPER: CPT | Mod: GZ | Performed by: SURGERY

## 2021-06-29 PROCEDURE — 80053 COMPREHEN METABOLIC PANEL: CPT | Performed by: SURGERY

## 2021-06-29 PROCEDURE — 84255 ASSAY OF SELENIUM: CPT | Performed by: SURGERY

## 2021-06-29 PROCEDURE — 84630 ASSAY OF ZINC: CPT | Performed by: SURGERY

## 2021-06-29 PROCEDURE — 86140 C-REACTIVE PROTEIN: CPT | Performed by: SURGERY

## 2021-06-29 PROCEDURE — 85025 COMPLETE CBC W/AUTO DIFF WBC: CPT | Performed by: SURGERY

## 2021-06-29 PROCEDURE — 84100 ASSAY OF PHOSPHORUS: CPT | Performed by: SURGERY

## 2021-06-29 RX ORDER — OXYCODONE HCL 5 MG/5 ML
5-10 SOLUTION, ORAL ORAL 3 TIMES DAILY PRN
Qty: 900 ML | Refills: 0 | Status: SHIPPED | OUTPATIENT
Start: 2021-06-29 | End: 2021-07-27

## 2021-06-29 RX ORDER — FENTANYL 25 UG/1
1 PATCH TRANSDERMAL
Qty: 15 PATCH | Refills: 0 | Status: SHIPPED | OUTPATIENT
Start: 2021-06-29 | End: 2021-07-27

## 2021-06-29 NOTE — TELEPHONE ENCOUNTER
Received call from patient requesting refill(s) of     oxyCODONE (ROXICODONE) 5 MG/5ML solution        fentaNYL (DURAGESIC) 25 mcg/hr 72 hr patch     Last dispensed from pharmacy on both on 6/7/21    Patient's last office/virtual visit by prescribing provider on 5/12/21  Next office/virtual appointment scheduled for 8/11/21    Last urine drug screen date 1/31/21  Current opioid agreement on file (completed within the last year) Yes Date of opioid agreement: 2/23/21    [place] [facility] [place] [address] [facility]    Will route to nursing Turner for review and preparation of prescription(s).

## 2021-06-29 NOTE — PROGRESS NOTES
This is a recent snapshot of the patient's [facility] medical record.  For current drug dose and complete information and questions, call [phone] or In Basket pool, fv home infusion ([id number])  CSN Number:  [id number]

## 2021-06-29 NOTE — TELEPHONE ENCOUNTER
Medication refill information reviewed.     Due date for oxyCODONE (ROXICODONE) 5 MG/5ML solution and fentaNYL (DURAGESIC) 25 mcg/hr 72 hr patch is 07/08/21     Prescriptions prepped for review.     Will route to provider.

## 2021-06-29 NOTE — TELEPHONE ENCOUNTER
Script Eprescribed to pharmacy  [place] Prescription Monitoring Program checked    Will send this to MA team to notify patient.    Signed Prescriptions:                        Disp   Refills    oxyCODONE (ROXICODONE) 5 MG/5ML solution   900 mL 0        Sig: Take 5-10 mLs (5-10 mg) by mouth 3 times daily as           needed for pain Max of 30mg/day. Put at least 4           hours between doses. Fill 07/06/21 to start           on/after 07/08/21. 30 day supply.  Authorizing Provider: BRANDYN JENKINS    fentaNYL (DURAGESIC) 25 mcg/hr 72 hr patch 15 pat*0        Sig: Place 1 patch onto the skin every 48 hours remove old           patch.  May fill 07/06/21 and start 07/08/21  Authorizing Provider: BRANDYN JENKINS    Narcan has previously been provided.  Jessica Jenkins MD  [facility]

## 2021-06-29 NOTE — TELEPHONE ENCOUNTER
[employer] message sent with Rx approval from provider.  Stephon  Pain Clinic Management Team

## 2021-06-29 NOTE — TELEPHONE ENCOUNTER
Refills have been requested for the following medications:         oxyCODONE (ROXICODONE) 5 MG/5ML solution [Patti Milligan MD]         fentaNYL (DURAGESIC) 25 mcg/hr 72 hr patch [Patti Milligan MD]     Preferred pharmacy: [address]

## 2021-07-01 LAB
COPPER SERPL-MCNC: 110.4 UG/DL (ref 70–140)
SELENIUM SERPL-MCNC: 121.6 UG/L (ref 23–190)
ZINC SERPL-MCNC: 66.5 UG/DL (ref 60–120)

## 2021-07-06 ENCOUNTER — HOME INFUSION (PRE-WILLOW HOME INFUSION) (OUTPATIENT)
Dept: PHARMACY | Facility: CLINIC | Age: 51
End: 2021-07-06

## 2021-07-07 ENCOUNTER — TELEPHONE (OUTPATIENT)
Dept: GASTROENTEROLOGY | Facility: CLINIC | Age: 51
End: 2021-07-07

## 2021-07-07 DIAGNOSIS — Z12.11 SPECIAL SCREENING FOR MALIGNANT NEOPLASMS, COLON: Primary | ICD-10-CM

## 2021-07-07 RX ORDER — BISACODYL 5 MG
TABLET, DELAYED RELEASE (ENTERIC COATED) ORAL
Qty: 4 TABLET | Refills: 0 | Status: SHIPPED | OUTPATIENT
Start: 2021-07-07 | End: 2021-08-16

## 2021-07-07 NOTE — TELEPHONE ENCOUNTER
Dr. Vyas - Please review and advise.     Patient scheduled for colonoscopy on 7/14/21 for screening.    Patient with azevedo catheter which wife states will be removed and switch to a Jim. Patient is on TPN.     Golytely prep would be recommended bowel prep for patient.     What would be the recommendations for administering bowel prep? Patient would need to be on clear liquids day prior. How would patient go about that with being on TPN? Extra fluid bolus needed?    Will include Dr. Estrada as well in case there are any other recommendations.    Thank you,   Jyothi Holloway RN  Pre Assessment Endoscopy RN

## 2021-07-07 NOTE — TELEPHONE ENCOUNTER
Pre assessment questions completed for upcoming colonoscopy procedure scheduled on 7/14/21 with patient's wife.     COVID test scheduled 7/12/21    Golytely prep script sent to [facility] pharmacy. Writer is unsure of gtube and bowel prep. Patient is on TPNs.   Will consult with Dr. Vyas who wife states manages patient's azevedo catheter.     Procedural arrival time and facility location reviewed.    Designated  policy reviewed.     Patient has a scheduled appointment with Dr. Vyas tomorrow regarding changing gtube to a kassandra. Advised wife to consult with Dr. Vyas regarding bowel prep but writer will also reach out as well. Wife agreed to plan.    Jyothi Holloway RN

## 2021-07-08 ENCOUNTER — OFFICE VISIT (OUTPATIENT)
Dept: ENDOCRINOLOGY | Facility: CLINIC | Age: 51
End: 2021-07-08
Payer: COMMERCIAL

## 2021-07-08 VITALS
HEIGHT: 71 IN | TEMPERATURE: 98.9 F | BODY MASS INDEX: 28.4 KG/M2 | SYSTOLIC BLOOD PRESSURE: 121 MMHG | OXYGEN SATURATION: 96 % | HEART RATE: 62 BPM | DIASTOLIC BLOOD PRESSURE: 75 MMHG | WEIGHT: 202.9 LBS

## 2021-07-08 DIAGNOSIS — E44.0 MODERATE PROTEIN-CALORIE MALNUTRITION (H): Primary | ICD-10-CM

## 2021-07-08 PROCEDURE — 99213 OFFICE O/P EST LOW 20 MIN: CPT | Performed by: SURGERY

## 2021-07-08 ASSESSMENT — MIFFLIN-ST. JEOR: SCORE: 1812.48

## 2021-07-08 ASSESSMENT — PAIN SCALES - GENERAL: PAINLEVEL: MILD PAIN (3)

## 2021-07-08 NOTE — PROGRESS NOTES
Return Bariatric Surgery Note    RE: Parker Acevedo  MR#: [id number]  : 1970  VISIT DATE: 2021    Dear Dr. Isaac,    I had the pleasure of seeing your patient, Parker Acevedo, in my post-bariatric surgery assessment clinic.    I worked with Dr. Bach about 2 months ago to replace g-tube in gastric remnant for Parker.    In the meantime he is well-hydrated and overall feeling well.    Has upcoming colonoscopy.    Came to clinic for g-tube removal/replacement.        CHIEF COMPLAINT: Post-bariatric surgery follow-up    HISTORY OF PRESENT ILLNESS:  Questions Regarding Prior Weight Loss Surgery Reviewed With Patient 2021   I had the following weight loss procedure: Celestino-en-y Gastric Bypass   What year was your surgery?    How has your weight changed since your last visit? I have lost weight   Are you currently taking any weight loss medications? No   Do you currently have any of the following: Heartburn, acid reflux, or GERD (acid reflux disease)?   Have you been to the Emergency room since your last visit with us? Yes   Were you in the hospital since your last visit with us? Yes   Do you have any concerns today? No       Weight History:     2021   What is your highest lifetime weight? 500   What is your lowest weight since surgery? (In pounds) 141     Initial Weight (lbs): 495 lbs  Weight: 92 kg (202 lb 14.4 oz)  Last Visits Weight: 91.7 kg (202 lb 3.2 oz)  Cumulative weight loss (lbs): 292.1  Weight Loss Percentage: 59.01%  Waist Circumference (cm): 106 cm    Questions Regarding Co-Morbidities and Health Concerns Reviewed With Patient 2021   Pre-diabetes: Never   Diabetes II: Never   High Blood Pressure: Never   High cholesterol: Never   Heartburn/Reflux: Stayed the same   Are you taking daily medication for heartburn, acid reflux, or GERD (acid reflux disease)? Yes   Sleep apnea: Stayed the same   PCOS: Never   Back pain: Stayed the same   Joint pain: Stayed the same   Lower  "leg swelling: Stayed the same       Eating Habits 7/7/2021   How many meals do you eat per day? 1   Do you snack between meals? Sometimes   How much food are you eating at each meal? Less than 1/2 cup   Are you able to separate your meals and liquids by at least 30 minutes? Sometimes   Are you able to avoid liquid calories? Yes       Exercise Questions Reviewed With Patient 7/7/2021   How often do you exercise? Less than 1 time per week   What is the duration of your exercise (in minutes)? 10 Minutes   What types of exercise do you do? walking   What keeps you from being more active?  Pain       Social History:      7/7/2021   Are you smoking? No   Are you drinking alcohol? No       Medications:  Current Outpatient Medications   Medication     acetaminophen (TYLENOL) 32 mg/mL liquid     albuterol (PROAIR HFA/PROVENTIL HFA/VENTOLIN HFA) 108 (90 Base) MCG/ACT inhaler     amphetamine-dextroamphetamine (ADDERALL) 20 MG tablet     bisacodyl (DULCOLAX) 5 MG EC tablet     carvedilol (COREG) 6.25 MG tablet     cyanocobalamin (CYANOCOBALAMIN) 1000 MCG/ML injection     diphenhydrAMINE (BENADRYL) 12.5 MG/5ML syrup     EPINEPHrine (ANY BX GENERIC EQUIV) 0.3 MG/0.3ML injection 2-pack     [facility] INFUSION MANAGED PATIENT     fentaNYL (DURAGESIC) 25 mcg/hr 72 hr patch     insulin syringe-needle U-100 (29G X 1/2\" 1 ML) 29G X 1/2\" 1 ML miscellaneous     Lidocaine (LIDOCARE) 4 % Patch     LORazepam (ATIVAN) 1 MG tablet     naloxone (NARCAN) 4 MG/0.1ML nasal spray     nicotine (COMMIT) 2 MG lozenge     nicotine (NICODERM CQ) 7 MG/24HR 24 hr patch     nystatin (MYCOSTATIN) 155904 UNIT/GM external cream     ondansetron (ZOFRAN-ODT) 8 MG ODT tab     oxyCODONE (ROXICODONE) 5 MG/5ML solution     pantoprazole (PROTONIX) 40 MG EC tablet     parenteral nutrition - PTA/DISCHARGE ORDER     polyethylene glycol (GOLYTELY) 236 g suspension     polyethylene glycol (MIRALAX) 17 g packet     QUEtiapine (SEROQUEL) 25 MG tablet     sodium " "chloride 0.45% SOLN BOLUS     sucralfate (CARAFATE) 1 GM/10ML suspension     VENTOLIN  (90 Base) MCG/ACT inhaler     vitamin D2 (ERGOCALCIFEROL) 69006 units (1250 mcg) capsule     No current facility-administered medications for this visit.          7/7/2021   Do you avoid NSAIDs such as (Ibuprofen, Aleve, Naproxen, Advil)?   No       ROS:  GI:      7/7/2021   Vomiting: Yes   Diarrhea: Yes   Constipation: No   Swallowing trouble: Yes   Abdominal pain: Yes   Heartburn: Yes   Rash in skin folds: No   Depression: No   Stress urinary incontinence No     Skin:   BAR RBS ROS - SKIN 7/7/2021   Rash in skin folds: No     Psych:      7/7/2021   Depression: No   Anxiety: Yes     Female Only: No flowsheet data found.    LABS/IMAGING/MEDICAL RECORDS REVIEW:     n/a    PHYSICAL EXAMINATION:  /75 (BP Location: Left arm, Patient Position: Sitting, Cuff Size: Adult Regular)   Pulse 62   Temp 98.9  F (37.2  C) (Oral)   Ht 1.803 m (5' 11\")   Wt 92 kg (202 lb 14.4 oz)   SpO2 96%   BMI 28.30 kg/m     NAD  Breathing unlabored    ASSESSMENT AND PLAN:      1. Many years status laparoscopic gastric bypass with revisions and malnutrition requiring tpn from bowel dysmotility.  2. Morbid Obesity historical current BMI: Body mass index is 28.3 kg/m .  3. Post surgical malabsorption:   Labs ordered per protocol.   Follow food plan per dietitian recommendations.   Continue taking recommended post-op vitamins.  4. Return to clinic when g-tube available.  We will require 2.0cm 16Fr low profile CLOIN g-tube for replacement.    Sincerely,    Francis Vyas MD    I spent a total of 20 minutes face to face with Parker during today's office visit. Over 50% of this time was spent counseling the patient and/or coordinating care.  "

## 2021-07-08 NOTE — LETTER
2021       RE: Parker Acevedo  [address]  [facility] [address]     Dear Colleague,    Thank you for referring your patient, Parker Acevedo, to the [facility]. Please see a copy of my visit note below.        Return Bariatric Surgery Note    RE: Parker Acevedo  MR#: [id number]  : 1970  VISIT DATE: 2021    Dear Dr. Isaac,    I had the pleasure of seeing your patient, Parker Acevedo, in my post-bariatric surgery assessment clinic.    I worked with Dr. Bach about 2 months ago to replace g-tube in gastric remnant for Parker.    In the meantime he is well-hydrated and overall feeling well.    Has upcoming colonoscopy.    Came to clinic for g-tube removal/replacement.        CHIEF COMPLAINT: Post-bariatric surgery follow-up    HISTORY OF PRESENT ILLNESS:  Questions Regarding Prior Weight Loss Surgery Reviewed With Patient 2021   I had the following weight loss procedure: Celestino-en-y Gastric Bypass   What year was your surgery?    How has your weight changed since your last visit? I have lost weight   Are you currently taking any weight loss medications? No   Do you currently have any of the following: Heartburn, acid reflux, or GERD (acid reflux disease)?   Have you been to the Emergency room since your last visit with us? Yes   Were you in the hospital since your last visit with us? Yes   Do you have any concerns today? No       Weight History:     2021   What is your highest lifetime weight? 500   What is your lowest weight since surgery? (In pounds) 141     Initial Weight (lbs): 495 lbs  Weight: 92 kg (202 lb 14.4 oz)  Last Visits Weight: 91.7 kg (202 lb 3.2 oz)  Cumulative weight loss (lbs): 292.1  Weight Loss Percentage: 59.01%  Waist Circumference (cm): 106 cm    Questions Regarding Co-Morbidities and Health Concerns Reviewed With Patient 2021   Pre-diabetes:  "Never   Diabetes II: Never   High Blood Pressure: Never   High cholesterol: Never   Heartburn/Reflux: Stayed the same   Are you taking daily medication for heartburn, acid reflux, or GERD (acid reflux disease)? Yes   Sleep apnea: Stayed the same   PCOS: Never   Back pain: Stayed the same   Joint pain: Stayed the same   Lower leg swelling: Stayed the same       Eating Habits 7/7/2021   How many meals do you eat per day? 1   Do you snack between meals? Sometimes   How much food are you eating at each meal? Less than 1/2 cup   Are you able to separate your meals and liquids by at least 30 minutes? Sometimes   Are you able to avoid liquid calories? Yes       Exercise Questions Reviewed With Patient 7/7/2021   How often do you exercise? Less than 1 time per week   What is the duration of your exercise (in minutes)? 10 Minutes   What types of exercise do you do? walking   What keeps you from being more active?  Pain       Social History:      7/7/2021   Are you smoking? No   Are you drinking alcohol? No       Medications:  Current Outpatient Medications   Medication     acetaminophen (TYLENOL) 32 mg/mL liquid     albuterol (PROAIR HFA/PROVENTIL HFA/VENTOLIN HFA) 108 (90 Base) MCG/ACT inhaler     amphetamine-dextroamphetamine (ADDERALL) 20 MG tablet     bisacodyl (DULCOLAX) 5 MG EC tablet     carvedilol (COREG) 6.25 MG tablet     cyanocobalamin (CYANOCOBALAMIN) 1000 MCG/ML injection     diphenhydrAMINE (BENADRYL) 12.5 MG/5ML syrup     EPINEPHrine (ANY BX GENERIC EQUIV) 0.3 MG/0.3ML injection 2-pack     [facility] INFUSION MANAGED PATIENT     fentaNYL (DURAGESIC) 25 mcg/hr 72 hr patch     insulin syringe-needle U-100 (29G X 1/2\" 1 ML) 29G X 1/2\" 1 ML miscellaneous     Lidocaine (LIDOCARE) 4 % Patch     LORazepam (ATIVAN) 1 MG tablet     naloxone (NARCAN) 4 MG/0.1ML nasal spray     nicotine (COMMIT) 2 MG lozenge     nicotine (NICODERM CQ) 7 MG/24HR 24 hr patch     nystatin (MYCOSTATIN) 199768 UNIT/GM external cream     " "ondansetron (ZOFRAN-ODT) 8 MG ODT tab     oxyCODONE (ROXICODONE) 5 MG/5ML solution     pantoprazole (PROTONIX) 40 MG EC tablet     parenteral nutrition - PTA/DISCHARGE ORDER     polyethylene glycol (GOLYTELY) 236 g suspension     polyethylene glycol (MIRALAX) 17 g packet     QUEtiapine (SEROQUEL) 25 MG tablet     sodium chloride 0.45% SOLN BOLUS     sucralfate (CARAFATE) 1 GM/10ML suspension     VENTOLIN  (90 Base) MCG/ACT inhaler     vitamin D2 (ERGOCALCIFEROL) 90301 units (1250 mcg) capsule     No current facility-administered medications for this visit.          7/7/2021   Do you avoid NSAIDs such as (Ibuprofen, Aleve, Naproxen, Advil)?   No       ROS:  GI:      7/7/2021   Vomiting: Yes   Diarrhea: Yes   Constipation: No   Swallowing trouble: Yes   Abdominal pain: Yes   Heartburn: Yes   Rash in skin folds: No   Depression: No   Stress urinary incontinence No     Skin:   BAR RBS ROS - SKIN 7/7/2021   Rash in skin folds: No     Psych:      7/7/2021   Depression: No   Anxiety: Yes     Female Only: No flowsheet data found.    LABS/IMAGING/MEDICAL RECORDS REVIEW:     n/a    PHYSICAL EXAMINATION:  /75 (BP Location: Left arm, Patient Position: Sitting, Cuff Size: Adult Regular)   Pulse 62   Temp 98.9  F (37.2  C) (Oral)   Ht 1.803 m (5' 11\")   Wt 92 kg (202 lb 14.4 oz)   SpO2 96%   BMI 28.30 kg/m     NAD  Breathing unlabored    ASSESSMENT AND PLAN:      1. Many years status laparoscopic gastric bypass with revisions and malnutrition requiring tpn from bowel dysmotility.  2. Morbid Obesity historical current BMI: Body mass index is 28.3 kg/m .  3. Post surgical malabsorption:   Labs ordered per protocol.   Follow food plan per dietitian recommendations.   Continue taking recommended post-op vitamins.  4. Return to clinic when g-tube available.  We will require 2.0cm 16Fr low profile COLIN g-tube for replacement.    Sincerely,    Francis Vyas MD    I spent a total of 20 minutes face to face with " Parker during today's office visit. Over 50% of this time was spent counseling the patient and/or coordinating care.

## 2021-07-08 NOTE — NURSING NOTE
"(   Chief Complaint   Patient presents with     Follow Up     Bariatric follow up, tube replacement.    )    ( Weight: 92 kg (202 lb 14.4 oz) )  ( Height: 180.3 cm (5' 11\") )  ( BMI (Calculated): 28.3 )  (   )  ( Cumulative weight loss (lbs): 292.1 )  ( Last Visits Weight: 91.7 kg (202 lb 3.2 oz) )  ( Wt change since last visit (lbs): 0.7 )  ( Waist Circumference (cm): 106 cm )  (   )    ( BP: 121/75 )  (   )  ( Temp: 98.9  F (37.2  C) )  ( Temp src: Oral )  ( Pulse: 62 )  (   )  ( SpO2: 96 % )    (   Patient Active Problem List   Diagnosis     Peptic ulcer disease     Gastric bypass status for obesity     Chronic abdominal pain     Vomiting     Anemia     ADHD (attention deficit hyperactivity disorder), inattentive type     Vitamin B12 deficiency without anemia     Thiamine deficiency     Dehydration     Dysphagia     Weight loss, non-intentional     Malnutrition (H)     Chronic anxiety     Constipation     Bile reflux esophagitis     Former smoker     Vitamin D deficiency     Iron deficiency     Health Care Home     Chronic pain     Insomnia     Positive blood culture     Fungemia     Chronic nausea     Gastrostomy tube in place (H)     Short gut syndrome     Anxiety     Status post cervical spinal arthrodesis     Port or reservoir infection, initial encounter     Abnormal echocardiogram     Low serum iron     Anemia, iron deficiency     Catheter-related bloodstream infection (CRBSI)     Generalized weakness     S/P bariatric surgery     Hyperlipidemia LDL goal <130     Bacteremia     Infection by Candida species     PICC line infiltration, sequela     Gram-positive bacteremia     On total parenteral nutrition     Gastric outlet obstruction     Short bowel syndrome     Bloodstream infection associated with central venous catheter, initial encounter     Fever, unknown origin    )  (   Current Outpatient Medications   Medication Sig Dispense Refill     acetaminophen (TYLENOL) 32 mg/mL liquid Take 15.65 mLs (500 mg) " "by mouth every 4 hours as needed for fever or mild pain 455 mL 1     albuterol (PROAIR HFA/PROVENTIL HFA/VENTOLIN HFA) 108 (90 Base) MCG/ACT inhaler Inhale 2 puffs into the lungs every 6 hours as needed       amphetamine-dextroamphetamine (ADDERALL) 20 MG tablet TAKE ONE TABLET BY MOUTH ONCE DAILY 30 tablet 0     bisacodyl (DULCOLAX) 5 MG EC tablet Take as directed. Two days before the exam take 2 tablets at bedtime. One day before the exam take 2 tablets at 3:00pm. 4 tablet 0     carvedilol (COREG) 6.25 MG tablet Take 6.25 mg by mouth 2 times daily (with meals)       cyanocobalamin (CYANOCOBALAMIN) 1000 MCG/ML injection INJECT 1 ML INTO THE MUSCLE EVERY 30 DAYS 1 mL 3     diphenhydrAMINE (BENADRYL) 12.5 MG/5ML syrup Take 25 mg by mouth every 4 hours as needed for itching, allergies or sleep 237 mL 0     EPINEPHrine (ANY BX GENERIC EQUIV) 0.3 MG/0.3ML injection 2-pack        [facility] INFUSION MANAGED PATIENT Contact [facility] for patient specific medication information at [phone] on admission and discharge from the hospital.  Phones are answered 24 hours a day 7 days a week 365 days a year.    Providers - Choose \"CONTINUE HOME MED (no script)\" at discharge if patient treatment with home infusion will continue.       fentaNYL (DURAGESIC) 25 mcg/hr 72 hr patch Place 1 patch onto the skin every 48 hours remove old patch.  May fill 07/06/21 and start 07/08/21 15 patch 0     insulin syringe-needle U-100 (29G X 1/2\" 1 ML) 29G X 1/2\" 1 ML miscellaneous Use to inject b12 every 30 days 3 each 4     Lidocaine (LIDOCARE) 4 % Patch Place 1 patch onto the skin every 24 hours To prevent lidocaine toxicity, patient should be patch free for 12 hrs daily. 30 patch 0     LORazepam (ATIVAN) 1 MG tablet TAKE 1 TABLET (1MG) BY MOUTH AS NEEDED FOR ANXIETY WITH TPN AND MEDICATIONS . JUST ONCE A DAY (30 TO LAST 30 DAYS) 30 tablet 0     naloxone (NARCAN) 4 MG/0.1ML nasal spray Spray 1 spray (4 mg) into one nostril " alternating nostrils as needed for opioid reversal every 2-3 minutes until assistance arrives 0.2 mL 0     nicotine (COMMIT) 2 MG lozenge Place 2 mg inside cheek every hour as needed       nicotine (NICODERM CQ) 7 MG/24HR 24 hr patch Place 1 patch onto the skin every 24 hours       nystatin (MYCOSTATIN) 878392 UNIT/GM external cream Apply topically 2 times daily 30 g 0     ondansetron (ZOFRAN-ODT) 8 MG ODT tab DISSOLVE ONE TABLET ON TONGUE EVERY 8 HOURS AS NEEDED FOR NAUSEA 90 tablet 1     oxyCODONE (ROXICODONE) 5 MG/5ML solution Take 5-10 mLs (5-10 mg) by mouth 3 times daily as needed for pain Max of 30mg/day. Put at least 4 hours between doses. Fill 07/06/21 to start on/after 07/08/21. 30 day supply. 900 mL 0     pantoprazole (PROTONIX) 40 MG EC tablet Take 1 tablet (40 mg) by mouth daily (Patient taking differently: Take 40 mg by mouth as needed ) 30 tablet 5     parenteral nutrition - PTA/DISCHARGE ORDER Patient receives 2050 mL TPN every 14 hours through PICC at [facility].       polyethylene glycol (GOLYTELY) 236 g suspension Take as directed. One day before exam fill the jug with water. Cover and shake until well mixed. At 6:00pm start drinking an 8oz glass of mixture every 15 minutes until jug is 1/2 empty. Store the rest in the refrigerator. Day of exam 6 hours before exam drink the other 1/2 of jug until it is gone 4000 mL 0     polyethylene glycol (MIRALAX) 17 g packet Take 1 packet by mouth 2 times daily       QUEtiapine (SEROQUEL) 25 MG tablet Take 25 mg by mouth At Bedtime       sodium chloride 0.45% SOLN BOLUS Inject 2,000 mLs into the vein daily as needed for other (dehydration) 2000 mL 0     sucralfate (CARAFATE) 1 GM/10ML suspension TAKE 10MLS  BY MOUTH FOUR TIMES A DAY AS NEEDED 420 mL 1     VENTOLIN  (90 Base) MCG/ACT inhaler INHALE TWO PUFFS BY MOUTH EVERY 4 HOURS AS NEEDED FOR SHORTNESS OF BREATH /DYSPNEA OR WHEEZING 18 g 1     vitamin D2 (ERGOCALCIFEROL) 32140 units (1250  mcg) capsule Take 1 capsule (50,000 Units) by mouth once a week 12 capsule 3    )  ( Diabetes Eval:    )    ( Pain Eval:  Mild Pain (3) )    ( Wound Eval:       )    (   History   Smoking Status     Current Some Day Smoker     Types: Cigarettes   Smokeless Tobacco     Never Used     Comment: 2/4/2021    smokes 3 cigarettes/day    )    ( Signed By:  Carol Middleton CMA; July 8, 2021; 7:38 AM )

## 2021-07-09 NOTE — TELEPHONE ENCOUNTER
Dr. Vyas - Please review and advise note below. Patient with upcoming colonoscopy procedure scheduled on 7/14/21    Writer seeking guidance regarding how patient to consume bowel prep while on TPN. Also, wife had questions regarding clear liquid diet and if extra fluid bolus needed while bowel prepping.     Thank you,   Jyothi Holloway RN

## 2021-07-12 ENCOUNTER — IMMUNIZATION (OUTPATIENT)
Dept: FAMILY MEDICINE | Facility: CLINIC | Age: 51
End: 2021-07-12
Payer: COMMERCIAL

## 2021-07-12 DIAGNOSIS — Z11.59 ENCOUNTER FOR SCREENING FOR OTHER VIRAL DISEASES: ICD-10-CM

## 2021-07-12 DIAGNOSIS — R78.81 RECURRENT BACTEREMIA: Primary | ICD-10-CM

## 2021-07-12 LAB — SARS-COV-2 RNA RESP QL NAA+PROBE: NEGATIVE

## 2021-07-12 PROCEDURE — U0003 INFECTIOUS AGENT DETECTION BY NUCLEIC ACID (DNA OR RNA); SEVERE ACUTE RESPIRATORY SYNDROME CORONAVIRUS 2 (SARS-COV-2) (CORONAVIRUS DISEASE [COVID-19]), AMPLIFIED PROBE TECHNIQUE, MAKING USE OF HIGH THROUGHPUT TECHNOLOGIES AS DESCRIBED BY CMS-2020-01-R: HCPCS

## 2021-07-12 PROCEDURE — 91301 PR COVID VAC MODERNA 100 MCG/0.5 ML IM: CPT

## 2021-07-12 PROCEDURE — 0011A PR COVID VAC MODERNA 100 MCG/0.5 ML IM: CPT

## 2021-07-12 PROCEDURE — U0005 INFEC AGEN DETEC AMPLI PROBE: HCPCS

## 2021-07-12 NOTE — PROGRESS NOTES
This is a recent snapshot of the patient's [facility] medical record.  For current drug dose and complete information and questions, call [phone] or In Basket pool, [facility] home infusion ([id number])  CSN Number:  [id number]

## 2021-07-12 NOTE — TELEPHONE ENCOUNTER
Called and spoke to wife Rose regarding note below.     Per Rose they were already advised by Dr. Vyas regarding bowel prep and clear liquid diet.    Wife was inquiring about pain medication that patient is on that is red in color liquid. Per wife patient needs to take this as the car ride is long.     Per GI recommendations no red or purple fluids should be consumed during bowel prep. Advised Rose to consult with provider to see if this medication can be held or if ok to take during bowel prepping. Rose agreed to plan.     Wife had no further questions or concerns at this time.     Jyothi Holloway RN

## 2021-07-13 PROCEDURE — 84100 ASSAY OF PHOSPHORUS: CPT | Mod: ORL | Performed by: SURGERY

## 2021-07-13 PROCEDURE — 83735 ASSAY OF MAGNESIUM: CPT | Mod: ORL | Performed by: SURGERY

## 2021-07-13 PROCEDURE — 84155 ASSAY OF PROTEIN SERUM: CPT | Performed by: SURGERY

## 2021-07-13 PROCEDURE — 84478 ASSAY OF TRIGLYCERIDES: CPT | Mod: GZ | Performed by: SURGERY

## 2021-07-13 PROCEDURE — 86140 C-REACTIVE PROTEIN: CPT | Performed by: SURGERY

## 2021-07-13 PROCEDURE — 80053 COMPREHEN METABOLIC PANEL: CPT | Performed by: SURGERY

## 2021-07-13 PROCEDURE — 83735 ASSAY OF MAGNESIUM: CPT | Performed by: SURGERY

## 2021-07-13 PROCEDURE — 86140 C-REACTIVE PROTEIN: CPT | Mod: ORL | Performed by: SURGERY

## 2021-07-13 PROCEDURE — 85025 COMPLETE CBC W/AUTO DIFF WBC: CPT | Mod: ORL | Performed by: SURGERY

## 2021-07-13 PROCEDURE — 85025 COMPLETE CBC W/AUTO DIFF WBC: CPT | Performed by: SURGERY

## 2021-07-13 PROCEDURE — 82728 ASSAY OF FERRITIN: CPT | Mod: ORL | Performed by: SURGERY

## 2021-07-13 PROCEDURE — 82728 ASSAY OF FERRITIN: CPT | Mod: GZ | Performed by: SURGERY

## 2021-07-13 PROCEDURE — 80053 COMPREHEN METABOLIC PANEL: CPT | Mod: ORL | Performed by: SURGERY

## 2021-07-13 NOTE — PROGRESS NOTES
This is a recent snapshot of the patient's [facility] medical record.  For current drug dose and complete information and questions, call [phone] or In Basket pool, [facility] home infusion ([id number])  CSN Number:  [id number]

## 2021-07-14 ENCOUNTER — PATIENT OUTREACH (OUTPATIENT)
Dept: CARE COORDINATION | Facility: CLINIC | Age: 51
End: 2021-07-14

## 2021-07-14 ENCOUNTER — ANESTHESIA EVENT (OUTPATIENT)
Dept: SURGERY | Facility: AMBULATORY SURGERY CENTER | Age: 51
End: 2021-07-14
Payer: COMMERCIAL

## 2021-07-14 ENCOUNTER — ANESTHESIA (OUTPATIENT)
Dept: SURGERY | Facility: AMBULATORY SURGERY CENTER | Age: 51
End: 2021-07-14
Payer: COMMERCIAL

## 2021-07-14 ENCOUNTER — HOSPITAL ENCOUNTER (OUTPATIENT)
Facility: AMBULATORY SURGERY CENTER | Age: 51
End: 2021-07-14
Attending: INTERNAL MEDICINE
Payer: COMMERCIAL

## 2021-07-14 ENCOUNTER — HOME INFUSION (PRE-WILLOW HOME INFUSION) (OUTPATIENT)
Dept: PHARMACY | Facility: CLINIC | Age: 51
End: 2021-07-14

## 2021-07-14 ENCOUNTER — TELEPHONE (OUTPATIENT)
Dept: GASTROENTEROLOGY | Facility: CLINIC | Age: 51
End: 2021-07-14

## 2021-07-14 VITALS
HEART RATE: 65 BPM | WEIGHT: 202 LBS | RESPIRATION RATE: 16 BRPM | BODY MASS INDEX: 28.28 KG/M2 | DIASTOLIC BLOOD PRESSURE: 80 MMHG | SYSTOLIC BLOOD PRESSURE: 126 MMHG | OXYGEN SATURATION: 100 % | HEIGHT: 71 IN | TEMPERATURE: 97.9 F

## 2021-07-14 VITALS — HEART RATE: 76 BPM

## 2021-07-14 DIAGNOSIS — R78.81 BACTEREMIA: Primary | ICD-10-CM

## 2021-07-14 LAB — COLONOSCOPY: NORMAL

## 2021-07-14 PROCEDURE — G0105 COLORECTAL SCRN; HI RISK IND: HCPCS | Mod: 74

## 2021-07-14 RX ORDER — NALOXONE HYDROCHLORIDE 0.4 MG/ML
0.2 INJECTION, SOLUTION INTRAMUSCULAR; INTRAVENOUS; SUBCUTANEOUS
Status: DISCONTINUED | OUTPATIENT
Start: 2021-07-14 | End: 2021-07-15 | Stop reason: HOSPADM

## 2021-07-14 RX ORDER — NALOXONE HYDROCHLORIDE 0.4 MG/ML
0.4 INJECTION, SOLUTION INTRAMUSCULAR; INTRAVENOUS; SUBCUTANEOUS
Status: DISCONTINUED | OUTPATIENT
Start: 2021-07-14 | End: 2021-07-15 | Stop reason: HOSPADM

## 2021-07-14 RX ORDER — PROCHLORPERAZINE MALEATE 10 MG
10 TABLET ORAL EVERY 6 HOURS PRN
Status: DISCONTINUED | OUTPATIENT
Start: 2021-07-14 | End: 2021-07-15 | Stop reason: HOSPADM

## 2021-07-14 RX ORDER — PROPOFOL 10 MG/ML
INJECTION, EMULSION INTRAVENOUS PRN
Status: DISCONTINUED | OUTPATIENT
Start: 2021-07-14 | End: 2021-07-14

## 2021-07-14 RX ORDER — SODIUM CHLORIDE, SODIUM LACTATE, POTASSIUM CHLORIDE, CALCIUM CHLORIDE 600; 310; 30; 20 MG/100ML; MG/100ML; MG/100ML; MG/100ML
500 INJECTION, SOLUTION INTRAVENOUS CONTINUOUS
Status: DISCONTINUED | OUTPATIENT
Start: 2021-07-14 | End: 2021-07-14 | Stop reason: HOSPADM

## 2021-07-14 RX ORDER — FLUMAZENIL 0.1 MG/ML
0.2 INJECTION, SOLUTION INTRAVENOUS
Status: ACTIVE | OUTPATIENT
Start: 2021-07-14 | End: 2021-07-14

## 2021-07-14 RX ORDER — ONDANSETRON 2 MG/ML
4 INJECTION INTRAMUSCULAR; INTRAVENOUS EVERY 6 HOURS PRN
Status: DISCONTINUED | OUTPATIENT
Start: 2021-07-14 | End: 2021-07-15 | Stop reason: HOSPADM

## 2021-07-14 RX ORDER — LIDOCAINE 40 MG/G
CREAM TOPICAL
Status: DISCONTINUED | OUTPATIENT
Start: 2021-07-14 | End: 2021-07-14 | Stop reason: HOSPADM

## 2021-07-14 RX ORDER — PROPOFOL 10 MG/ML
INJECTION, EMULSION INTRAVENOUS CONTINUOUS PRN
Status: DISCONTINUED | OUTPATIENT
Start: 2021-07-14 | End: 2021-07-14

## 2021-07-14 RX ORDER — SIMETHICONE
LIQUID (ML) MISCELLANEOUS PRN
Status: DISCONTINUED | OUTPATIENT
Start: 2021-07-14 | End: 2021-07-14 | Stop reason: HOSPADM

## 2021-07-14 RX ORDER — ONDANSETRON 4 MG/1
4 TABLET, ORALLY DISINTEGRATING ORAL EVERY 6 HOURS PRN
Status: DISCONTINUED | OUTPATIENT
Start: 2021-07-14 | End: 2021-07-15 | Stop reason: HOSPADM

## 2021-07-14 RX ORDER — LIDOCAINE HYDROCHLORIDE 10 MG/ML
INJECTION, SOLUTION INFILTRATION; PERINEURAL PRN
Status: DISCONTINUED | OUTPATIENT
Start: 2021-07-14 | End: 2021-07-14

## 2021-07-14 RX ORDER — ONDANSETRON 2 MG/ML
4 INJECTION INTRAMUSCULAR; INTRAVENOUS
Status: DISCONTINUED | OUTPATIENT
Start: 2021-07-14 | End: 2021-07-14 | Stop reason: HOSPADM

## 2021-07-14 RX ADMIN — SODIUM CHLORIDE, SODIUM LACTATE, POTASSIUM CHLORIDE, CALCIUM CHLORIDE 500 ML: 600; 310; 30; 20 INJECTION, SOLUTION INTRAVENOUS at 10:47

## 2021-07-14 RX ADMIN — LIDOCAINE HYDROCHLORIDE 50 MG: 10 INJECTION, SOLUTION INFILTRATION; PERINEURAL at 11:06

## 2021-07-14 RX ADMIN — PROPOFOL 150 MCG/KG/MIN: 10 INJECTION, EMULSION INTRAVENOUS at 11:12

## 2021-07-14 RX ADMIN — PROPOFOL 50 MG: 10 INJECTION, EMULSION INTRAVENOUS at 11:13

## 2021-07-14 RX ADMIN — PROPOFOL 30 MG: 10 INJECTION, EMULSION INTRAVENOUS at 11:26

## 2021-07-14 ASSESSMENT — LIFESTYLE VARIABLES: TOBACCO_USE: 1

## 2021-07-14 ASSESSMENT — MIFFLIN-ST. JEOR: SCORE: 1808.4

## 2021-07-14 NOTE — ANESTHESIA POSTPROCEDURE EVALUATION
Patient: Parker Acevedo    Procedure(s):  COLONOSCOPY    Diagnosis:Bariatric surgery status [Z98.84]  Diagnosis Additional Information: No value filed.    Anesthesia Type:  MAC    Note:  Disposition: Outpatient   Postop Pain Control: Uneventful            Sign Out: Well controlled pain   PONV: No   Neuro/Psych: Uneventful            Sign Out: Acceptable/Baseline neuro status   Airway/Respiratory: Uneventful            Sign Out: Acceptable/Baseline resp. status   CV/Hemodynamics: Uneventful            Sign Out: Acceptable CV status; No obvious hypovolemia; No obvious fluid overload   Other NRE: NONE   DID A NON-ROUTINE EVENT OCCUR? No           Last vitals:  Vitals:    07/14/21 0958 07/14/21 1143 07/14/21 1148   BP: (!) 134/91 120/77 126/80   Pulse: 52 70 65   Resp: 16 14 16   Temp: 36.4  C (97.5  F) 36.3  C (97.4  F) 36.6  C (97.9  F)   SpO2: 100% 99% 100%       Last vitals prior to Anesthesia Care Transfer:  CRNA VITALS  7/14/2021 1109 - 7/14/2021 1209      7/14/2021             Pulse:  86    SpO2:  99 %          Electronically Signed By: LESLYE ZHAO MD  July 14, 2021  12:48 PM   7

## 2021-07-14 NOTE — ANESTHESIA CARE TRANSFER NOTE
Patient: Parker Acevedo    Procedure(s):  COLONOSCOPY    Diagnosis: Bariatric surgery status [Z98.84]  Diagnosis Additional Information: No value filed.    Anesthesia Type:   MAC     Note:    Oropharynx: spontaneously breathing and oropharynx clear of all foreign objects  Level of Consciousness: awake  Oxygen Supplementation: room air    Independent Airway: airway patency satisfactory and stable  Dentition: dentition unchanged  Vital Signs Stable: post-procedure vital signs reviewed and stable  Report to RN Given: handoff report given  Patient transferred to: Phase II  Comments: 97.1-  120/71- 72   100%  Handoff Report: Identifed the Patient, Identified the Reponsible Provider, Reviewed the pertinent medical history, Discussed the surgical course, Reviewed Intra-OP anesthesia mangement and issues during anesthesia, Set expectations for post-procedure period and Allowed opportunity for questions and acknowledgement of understanding      Vitals: (Last set prior to Anesthesia Care Transfer)  CRNA VITALS  7/14/2021 1109 - 7/14/2021 1144      7/14/2021             Pulse:  86    SpO2:  99 %        Electronically Signed By: SAILAJA Strong CRNA  July 14, 2021  11:44 AM

## 2021-07-14 NOTE — TELEPHONE ENCOUNTER
"1st schedule attempt - per Dr. Estrada:    \"Pt needs repeat colonoscopy with MAC with double goyltely prep. Any GI provider ok\"    Procedure: Lower Endoscopy    Lower Endoscopy Type: Colonoscopy    Purpose of Colonoscopy Procedure: Diagnostic + strep bovus bacteremia   Colonoscopy reason for referral: + strep bovus bacteremia. Needs repeat with 2 day extended prep (golytely)    Colonoscopy Sedation: Deep/MAC    Timeframe Requested: Routine: Next available opening    Priority: Routine    Preferred Location: [facility]    Scheduling Instructions: If you have not heard from the scheduling office within 2 business days, please call [phone].           Associated Diagnoses    Bacteremia [R78.81]  - Primary           "

## 2021-07-14 NOTE — ANESTHESIA PREPROCEDURE EVALUATION
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     Complication of anesthesia      Difficulty swallowing      Gastric ulcer, unspecified as acute or chronic, without mention of hemorrhage, perforation, or obstruction      Gastro-oesophageal reflux disease      Head injury      Hiatal hernia      Other bladder disorder      Other chronic pain      PONV (postoperative nausea and vomiting)      Severe malnutrition (H)     TPN     Short gut syndrome      Tobacco abuse       Past Surgical History:   Procedure Laterality Date     AMPUTATION       APPENDECTOMY       BACK SURGERY  11/3/2014    curve in the spine     BIOPSY LYMPH NODE CERVICAL N/A 2015    Procedure: BIOPSY LYMPH NODE CERVICAL;  Surgeon: Baron Scanlon MD;  Location: [facility]     C GASTRIC BYPASS,OBESE<100CM SHAYLEE-EN-Y  2002    lost 300 pounds     CHOLECYSTECTOMY       DISCECTOMY, FUSION CERVICAL ANTERIOR ONE LEVEL, COMBINED N/A 2/15/2017    Procedure: COMBINED DISCECTOMY, FUSION CERVICAL ANTERIOR ONE LEVEL;  Surgeon: Darren Campos MD;  [facility]     ENDOSCOPIC INSERTION TUBE GASTROSTOMY  2013    Procedure: ENDOSCOPIC INSERTION TUBE GASTROSTOMY;;  Surgeon: Francis Vyas MD;  [facility]     ENDOSCOPIC ULTRASOUND UPPER GASTROINTESTINAL TRACT (GI)  2011    Procedure:ENDOSCOPIC ULTRASOUND UPPER GASTROINTESTINAL TRACT (GI); Both Procedures done Conjointly; Surgeon:NEREIDA HOUSER; [facility]     ENDOSCOPIC ULTRASOUND UPPER GASTROINTESTINAL TRACT (GI)   9/9/2013    Procedure: ENDOSCOPIC ULTRASOUND UPPER GASTROINTESTINAL TRACT (GI);  Endoscopic Ultrasound Guide Gastrostomy Tube Placement  C-arm;  Surgeon: Noe Lizarraga MD;  [facility]     ENDOSCOPIC ULTRASOUND UPPER GASTROINTESTINAL TRACT (GI) N/A 2/24/2021    Procedure: ENDOSCOPIC ULTRASOUND, ESOPHAGOSCOPY / UPPER GASTROINTESTINAL TRACT (GI), esophagastrogastroduodenoscopy;  Surgeon: Berny Bach MD;  [facility]     ENDOSCOPY  03/25/11    EGD, [facility]     ENDOSCOPY  08/04/09    Upper Endoscopy, [facility]     ENDOSCOPY  01/05/09    Upper Endoscopy, [facility]     ESOPHAGOSCOPY, GASTROSCOPY, DUODENOSCOPY (EGD), COMBINED  4/20/2011    Procedure:COMBINED ESOPHAGOSCOPY, GASTROSCOPY, DUODENOSCOPY (EGD); Surgeon:BLU VYAS; [facility]     ESOPHAGOSCOPY, GASTROSCOPY, DUODENOSCOPY (EGD), COMBINED  6/15/2011    Procedure:COMBINED ESOPHAGOSCOPY, GASTROSCOPY, DUODENOSCOPY (EGD); Surgeon:BLU VYAS; [facility]     ESOPHAGOSCOPY, GASTROSCOPY, DUODENOSCOPY (EGD), COMBINED  6/12/2013    Procedure: COMBINED ESOPHAGOSCOPY, GASTROSCOPY, DUODENOSCOPY (EGD);;  Surgeon: Blu Vyas MD;  [facility]     ESOPHAGOSCOPY, GASTROSCOPY, DUODENOSCOPY (EGD), COMBINED  11/22/2013    Procedure: COMBINED ESOPHAGOSCOPY, GASTROSCOPY, DUODENOSCOPY (EGD);;  Surgeon: Blu Vyas MD;  [facility]     ESOPHAGOSCOPY, GASTROSCOPY, DUODENOSCOPY (EGD), COMBINED  4/30/2014    Procedure: COMBINED ESOPHAGOSCOPY, GASTROSCOPY, DUODENOSCOPY (EGD);  Surgeon: Blu Vyas MD;  [facility]     ESOPHAGOSCOPY, GASTROSCOPY, DUODENOSCOPY (EGD), COMBINED N/A 2/20/2015    Procedure: COMBINED ESOPHAGOSCOPY, GASTROSCOPY, DUODENOSCOPY (EGD), BIOPSY SINGLE OR MULTIPLE;  Surgeon: Baron Scanlon MD;  Location:  OR     ESOPHAGOSCOPY, GASTROSCOPY, DUODENOSCOPY (EGD), COMBINED N/A 9/30/2015    Procedure: COMBINED ESOPHAGOSCOPY, GASTROSCOPY, DUODENOSCOPY  (EGD);  Surgeon: Francis Vyas MD;  Location:  GI     ESOPHAGOSCOPY, GASTROSCOPY, DUODENOSCOPY (EGD), COMBINED N/A 10/3/2019    Procedure: Upper Endoscopy;  Surgeon: Clif Morrow MD;  [facility]     GASTRECTOMY  6/22/2012    Procedure: GASTRECTOMY;  Open Approach, Excise Ulcers,Partial Gastrectomy, Esophagojejunostomy, Hiatal Hernia Repair, Extensive Lysis of Adhesions and Esaphagogastrodudenoscopy.;  Surgeon: Francis Vyas MD;  [facility]     GASTROJEJUNOSTOMY  08/26/09    Extensice enterolysis, partial resect. jejunum, part. resect gastric pouch, gastrojejunostomy anastomosis     HC ESOPH/GAS REFLUX TEST W NASAL IMPED ELECTRODE  8/5/2013    Procedure: ESOPHAGEAL IMPEDENCE FUNCTION TEST 1 HOUR OR LESS;  Surgeon: Halie Lang MD;  Location:  GI     HEAD & NECK SURGERY  2/15/2017    C5-C6     HERNIA REPAIR  2006    Umbilical hernia     HERNIORRHAPHY HIATAL  6/22/2012    Procedure: HERNIORRHAPHY HIATAL;;  Surgeon: Francis Vyas MD;  [facility]     HERNIORRHAPHY INGUINAL  11/22/2013    Procedure: HERNIORRHAPHY INGUINAL;;  Surgeon: Francis Vyas MD;  [facility]     INSERT PICC LINE Right 12/19/2019    Procedure: Picc Placement;  Surgeon: Per Dumont PA-C;  [facility]     INSERT PICC LINE Right 2/21/2020    Procedure: INSERTION, PICC;  Surgeon: Per Dumont PA-C;  [facility]     INSERT PORT VASCULAR ACCESS Right 12/19/2017    Procedure: INSERT PORT VASCULAR ACCESS;  Right Chest Port Placement ;  Surgeon: Lisandro Alejandro PA-C;  [facility]     INSERT PORT VASCULAR ACCESS Right 8/2/2018    Procedure: INSERT PORT VASCULAR ACCESS;  Place single lumen tunneled central venous access catheter;  Surgeon: Guy Jamil PA-C;  [facility]     IR CVC TUNNEL PLACEMENT > 5 YRS OF AGE  8/7/2019     IR CVC TUNNEL PLACEMENT > 5 YRS OF AGE  4/14/2020     IR CVC TUNNEL PLACEMENT > 5 YRS OF AGE  8/3/2020     IR CVC  TUNNEL PLACEMENT > 5 YRS OF AGE  9/4/2020     IR CVC TUNNEL PLACEMENT > 5 YRS OF AGE  2/5/2021     IR CVC TUNNEL PLACEMENT > 5 YRS OF AGE  3/23/2021     IR CVC TUNNEL REMOVAL RIGHT  10/1/2019     IR CVC TUNNEL REMOVAL RIGHT  7/30/2020     IR CVC TUNNEL REMOVAL RIGHT  9/2/2020     IR CVC TUNNEL REMOVAL RIGHT  2/3/2021     IR CVC TUNNEL REMOVAL RIGHT  3/19/2021     IR CVC TUNNEL REVISION RIGHT  5/7/2021     IR FOLLOW UP VISIT OUTPATIENT  8/7/2019     IR PICC PLACEMENT > 5 YRS OF AGE  3/7/2019     IR PICC PLACEMENT > 5 YRS OF AGE  12/19/2019     IR PICC PLACEMENT > 5 YRS OF AGE  2/21/2020     LAPAROTOMY EXPLORATORY  11/22/2013    Procedure: LAPAROTOMY EXPLORATORY;  Exploratory Laparotomy, Upper Endoscopy, Left Inguinal Hernia Repair;  Surgeon: Francis Vyas MD;  [facility]     ORTHOPEDIC SURGERY       PICC INSERTION Right 03/16/2017    5fr DL BioFlo PICC, 42cm (3cm external) in the R medial brachial vein w/ tip in the SVC RA junction.     PICC INSERTION Left 09/23/2017    5fr DL BioFlo PICC, 45cm (1cm external) in the L basilic vein w/ tip in the SVC RA junction.     PICC INSERTION Right 05/16/2019    5Fr - 43cm, Medial brachial vein, low SVC     PICC INSERTION Right 10/02/2019    5Fr - 43cm (2cm external), basilic vein, low SVC     SHAYLEE EN Y BOWEL  2003     SOFT TISSUE SURGERY       THORACIC SURGERY       TONSILLECTOMY       TRANSESOPHAGEAL ECHOCARDIOGRAM INTRAOPERATIVE N/A 1/8/2019    Procedure: TRANSESOPHAGEAL ECHOCARDIOGRAM INTRAOPERATIVE;  Surgeon: GENERIC ANESTHESIA PROVIDER;  [facility]      Allergies   Allergen Reactions     Bactrim [Sulfamethoxazole W/Trimethoprim] Rash     Penicillins Anaphylaxis     Please see Antimicrobial Management Team allergy assessment note 10/10/2018. Patient reported tolerating amoxicillin.     Doxycycline Rash     Vancomycin Rash     Rash after receiving vancomycin 3/28/16 (red man's?). Tolerated with slower infusion and diphenhydramine premed.      Social History      Tobacco Use     Smoking status: Current Some Day Smoker     Types: Cigarettes     Smokeless tobacco: Never Used     Tobacco comment: 2/4/2021    smokes 3 cigarettes/day   Substance Use Topics     Alcohol use: No     Comment: quit 2002      Wt Readings from Last 1 Encounters:   07/14/21 91.6 kg (202 lb)        Anesthesia Evaluation            ROS/MED HX  ENT/Pulmonary:     (+) tobacco use,     Neurologic:       Cardiovascular:     (+) Dyslipidemia -----Previous cardiac testing   Echo: Date: 2/2/21 Results:  No evidence of vegetation on the aortic, tricuspid and mitral valve, the  pulmonic valve was not well visualized. If clinically indicated, consider RONEL  to evaluate for endocarditis.  Global and regional left ventricular function is normal with an EF of 55-60%.  The right ventricle is normal size. Global right ventricular function is  normal.  Stress Test: Date: Results:    ECG Reviewed: Date: Results:    Cath: Date: Results:      METS/Exercise Tolerance:     Hematologic:       Musculoskeletal:       GI/Hepatic: Comment: Dysphagia, short gut syndrome    (+) GERD, hiatal hernia,     Renal/Genitourinary:       Endo:       Psychiatric/Substance Use: Comment: ADHD    (+) psychiatric history anxiety and other (comment)     Infectious Disease:       Malignancy:       Other:            Physical Exam    Airway        Mallampati: I   TM distance: > 3 FB   Neck ROM: full   Mouth opening: > 3 cm    Respiratory Devices and Support         Dental     Comment: adentulous        Cardiovascular   cardiovascular exam normal          Pulmonary   pulmonary exam normal                OUTSIDE LABS:  CBC:   Lab Results   Component Value Date    WBC 10.2 07/13/2021    WBC 6.9 06/29/2021    HGB 11.4 (L) 07/13/2021    HGB 12.1 (L) 06/29/2021    HCT 34.9 (L) 07/13/2021    HCT 37.4 (L) 06/29/2021     07/13/2021     06/29/2021     BMP:   Lab Results   Component Value Date     07/13/2021     06/29/2021     POTASSIUM 3.7 07/13/2021    POTASSIUM 3.5 06/29/2021    CHLORIDE 109 07/13/2021    CHLORIDE 111 (H) 06/29/2021    CO2 32 07/13/2021    CO2 28 06/29/2021    BUN 14 07/13/2021    BUN 11 06/29/2021    CR 0.82 07/13/2021    CR 0.69 06/29/2021     (H) 07/13/2021    GLC 96 06/29/2021     COAGS:   Lab Results   Component Value Date    PTT 26 07/22/2019    INR 1.07 05/07/2021    FIBR 324 10/25/2016     POC:   Lab Results   Component Value Date    BGM 83 03/23/2021     HEPATIC:   Lab Results   Component Value Date    ALBUMIN 3.5 07/13/2021    PROTTOTAL 6.7 (L) 07/13/2021    ALT 33 07/13/2021    AST 18 07/13/2021    ALKPHOS 71 07/13/2021    BILITOTAL 0.4 07/13/2021     OTHER:   Lab Results   Component Value Date    LACT 0.9 03/19/2021    A1C 4.9 07/23/2013    SILAS 8.1 (L) 07/13/2021    PHOS 3.8 07/13/2021    MAG 2.3 07/13/2021    LIPASE 58 (L) 09/29/2019    AMYLASE 178 (H) 06/28/2012    TSH 1.16 05/12/2019    CRP 7.8 07/13/2021    SED 10 07/28/2020       Anesthesia Plan    ASA Status:  2   NPO Status:  NPO Appropriate    Anesthesia Type: MAC.     - Reason for MAC: straight local not clinically adequate   Induction: Intravenous.   Maintenance: TIVA.        Consents    Anesthesia Plan(s) and associated risks, benefits, and realistic alternatives discussed. Questions answered and patient/representative(s) expressed understanding.     - Discussed with:  Patient         Postoperative Care    Pain management: Multi-modal analgesia.   PONV prophylaxis: Background Propofol Infusion     Comments:                LESLYE ZHAO MD

## 2021-07-14 NOTE — PROGRESS NOTES
Clinic Care Coordination Contact    Situation: Patient chart reviewed by care coordinator.    Background: Pt is due for routine [facility] follow up call    Assessment: Pt was in to have a colonoscopy today.     Plan/Recommendations: [facility] will outreach in 2-3 business days for follow up.       Dianelys MAYN, RN, PHN, CCM  Primary Clinic Care Coordination    [facility]  Primary Care Clinics  [email or web address]   Office: [phone]  Employed by [facility]

## 2021-07-14 NOTE — H&P
Parker Acevedo  [id number]  male  50 year old      Reason for procedure/surgery: colon cancer screening    Patient Active Problem List   Diagnosis     Peptic ulcer disease     Gastric bypass status for obesity     Chronic abdominal pain     Vomiting     Anemia     ADHD (attention deficit hyperactivity disorder), inattentive type     Vitamin B12 deficiency without anemia     Thiamine deficiency     Dehydration     Dysphagia     Weight loss, non-intentional     Malnutrition (H)     Chronic anxiety     Constipation     Bile reflux esophagitis     Former smoker     Vitamin D deficiency     Iron deficiency     Health Care Home     Chronic pain     Insomnia     Positive blood culture     Fungemia     Chronic nausea     Gastrostomy tube in place (H)     Short gut syndrome     Anxiety     Status post cervical spinal arthrodesis     Port or reservoir infection, initial encounter     Abnormal echocardiogram     Low serum iron     Anemia, iron deficiency     Catheter-related bloodstream infection (CRBSI)     Generalized weakness     S/P bariatric surgery     Hyperlipidemia LDL goal <130     Bacteremia     Infection by Candida species     PICC line infiltration, sequela     Gram-positive bacteremia     On total parenteral nutrition     Gastric outlet obstruction     Short bowel syndrome     Bloodstream infection associated with central venous catheter, initial encounter     Fever, unknown origin       Past Surgical History:    Past Surgical History:   Procedure Laterality Date     AMPUTATION       APPENDECTOMY       BACK SURGERY  11/3/2014    curve in the spine     BIOPSY LYMPH NODE CERVICAL N/A 2/20/2015    Procedure: BIOPSY LYMPH NODE CERVICAL;  Surgeon: Baron Scanlon MD;  [facility]: [facility]     C GASTRIC BYPASS,OBESE<100CM SHAYLEE-EN-Y  2002    lost 300 pounds     CHOLECYSTECTOMY       DISCECTOMY, FUSION CERVICAL ANTERIOR ONE LEVEL, COMBINED N/A 2/15/2017    Procedure: COMBINED DISCECTOMY, FUSION CERVICAL ANTERIOR ONE  LEVEL;  Surgeon: Darren Campos MD;  [facility]     ENDOSCOPIC INSERTION TUBE GASTROSTOMY  9/9/2013    Procedure: ENDOSCOPIC INSERTION TUBE GASTROSTOMY;;  Surgeon: Francis Vyas MD;  [facility]     ENDOSCOPIC ULTRASOUND UPPER GASTROINTESTINAL TRACT (GI)  4/29/2011    Procedure:ENDOSCOPIC ULTRASOUND UPPER GASTROINTESTINAL TRACT (GI); Both Procedures done Conjointly; Surgeon:NEREIDA HOUSER; [facility]     ENDOSCOPIC ULTRASOUND UPPER GASTROINTESTINAL TRACT (GI)  9/9/2013    Procedure: ENDOSCOPIC ULTRASOUND UPPER GASTROINTESTINAL TRACT (GI);  Endoscopic Ultrasound Guide Gastrostomy Tube Placement  C-arm;  Surgeon: Noe Lizarraga MD;  [facility]     ENDOSCOPIC ULTRASOUND UPPER GASTROINTESTINAL TRACT (GI) N/A 2/24/2021    Procedure: ENDOSCOPIC ULTRASOUND, ESOPHAGOSCOPY / UPPER GASTROINTESTINAL TRACT (GI), esophagastrogastroduodenoscopy;  Surgeon: Berny Bach MD;  [facility]     ENDOSCOPY  03/25/11    EGD, [facility]     ENDOSCOPY  08/04/09    Upper Endoscopy, [facility]     ENDOSCOPY  01/05/09    Upper Endoscopy, [facility]     ESOPHAGOSCOPY, GASTROSCOPY, DUODENOSCOPY (EGD), COMBINED  4/20/2011    Procedure:COMBINED ESOPHAGOSCOPY, GASTROSCOPY, DUODENOSCOPY (EGD); Surgeon:FRANCIS VYAS; [facility]     ESOPHAGOSCOPY, GASTROSCOPY, DUODENOSCOPY (EGD), COMBINED  6/15/2011    Procedure:COMBINED ESOPHAGOSCOPY, GASTROSCOPY, DUODENOSCOPY (EGD); Surgeon:FRANCIS VYAS; [facility]     ESOPHAGOSCOPY, GASTROSCOPY, DUODENOSCOPY (EGD), COMBINED  6/12/2013    Procedure: COMBINED ESOPHAGOSCOPY, GASTROSCOPY, DUODENOSCOPY (EGD);;  Surgeon: Francis Vyas MD;  [facility]     ESOPHAGOSCOPY, GASTROSCOPY, DUODENOSCOPY (EGD), COMBINED  11/22/2013    Procedure: COMBINED ESOPHAGOSCOPY, GASTROSCOPY, DUODENOSCOPY (EGD);;  Surgeon: Francis Vyas MD;  [facility]     ESOPHAGOSCOPY, GASTROSCOPY, DUODENOSCOPY (EGD), COMBINED   4/30/2014    Procedure: COMBINED ESOPHAGOSCOPY, GASTROSCOPY, DUODENOSCOPY (EGD);  Surgeon: Francis Vyas MD;  [facility]     ESOPHAGOSCOPY, GASTROSCOPY, DUODENOSCOPY (EGD), COMBINED N/A 2/20/2015    Procedure: COMBINED ESOPHAGOSCOPY, GASTROSCOPY, DUODENOSCOPY (EGD), BIOPSY SINGLE OR MULTIPLE;  Surgeon: Baron Scanlon MD;  [facility]     ESOPHAGOSCOPY, GASTROSCOPY, DUODENOSCOPY (EGD), COMBINED N/A 9/30/2015    Procedure: COMBINED ESOPHAGOSCOPY, GASTROSCOPY, DUODENOSCOPY (EGD);  Surgeon: Francis Vyas MD;  [facility]     ESOPHAGOSCOPY, GASTROSCOPY, DUODENOSCOPY (EGD), COMBINED N/A 10/3/2019    Procedure: Upper Endoscopy;  Surgeon: Clif Morrow MD;  [facility]     GASTRECTOMY  6/22/2012    Procedure: GASTRECTOMY;  Open Approach, Excise Ulcers,Partial Gastrectomy, Esophagojejunostomy, Hiatal Hernia Repair, Extensive Lysis of Adhesions and Esaphagogastrodudenoscopy.;  Surgeon: Francis Vyas MD;  [facility]     GASTROJEJUNOSTOMY  08/26/09    Extensice enterolysis, partial resect. jejunum, part. resect gastric pouch, gastrojejunostomy anastomosis     HC ESOPH/GAS REFLUX TEST W NASAL IMPED ELECTRODE  8/5/2013    Procedure: ESOPHAGEAL IMPEDENCE FUNCTION TEST 1 HOUR OR LESS;  Surgeon: Halie Lang MD;  Location:  GI     HEAD & NECK SURGERY  2/15/2017    C5-C6     HERNIA REPAIR  2006    Umbilical hernia     HERNIORRHAPHY HIATAL  6/22/2012    Procedure: HERNIORRHAPHY HIATAL;;  Surgeon: Francis Vyas MD;  [facility]     HERNIORRHAPHY INGUINAL  11/22/2013    Procedure: HERNIORRHAPHY INGUINAL;;  Surgeon: Francis Vyas MD;  [facility]     INSERT PICC LINE Right 12/19/2019    Procedure: Picc Placement;  Surgeon: Per Dumont PA-C;  [facility]     INSERT PICC LINE Right 2/21/2020    Procedure: INSERTION, PICC;  Surgeon: Per Dumont PA-C;  [facility]     INSERT PORT VASCULAR ACCESS Right 12/19/2017    Procedure:  INSERT PORT VASCULAR ACCESS;  Right Chest Port Placement ;  Surgeon: Lisandro Alejandro PA-C;  [facility]     INSERT PORT VASCULAR ACCESS Right 8/2/2018    Procedure: INSERT PORT VASCULAR ACCESS;  Place single lumen tunneled central venous access catheter;  Surgeon: Guy Jamil PA-C;  [facility]     IR CVC TUNNEL PLACEMENT > 5 YRS OF AGE  8/7/2019     IR CVC TUNNEL PLACEMENT > 5 YRS OF AGE  4/14/2020     IR CVC TUNNEL PLACEMENT > 5 YRS OF AGE  8/3/2020     IR CVC TUNNEL PLACEMENT > 5 YRS OF AGE  9/4/2020     IR CVC TUNNEL PLACEMENT > 5 YRS OF AGE  2/5/2021     IR CVC TUNNEL PLACEMENT > 5 YRS OF AGE  3/23/2021     IR CVC TUNNEL REMOVAL RIGHT  10/1/2019     IR CVC TUNNEL REMOVAL RIGHT  7/30/2020     IR CVC TUNNEL REMOVAL RIGHT  9/2/2020     IR CVC TUNNEL REMOVAL RIGHT  2/3/2021     IR CVC TUNNEL REMOVAL RIGHT  3/19/2021     IR CVC TUNNEL REVISION RIGHT  5/7/2021     IR FOLLOW UP VISIT OUTPATIENT  8/7/2019     IR PICC PLACEMENT > 5 YRS OF AGE  3/7/2019     IR PICC PLACEMENT > 5 YRS OF AGE  12/19/2019     IR PICC PLACEMENT > 5 YRS OF AGE  2/21/2020     LAPAROTOMY EXPLORATORY  11/22/2013    Procedure: LAPAROTOMY EXPLORATORY;  Exploratory Laparotomy, Upper Endoscopy, Left Inguinal Hernia Repair;  Surgeon: Francis Vyas MD;  [facility]     ORTHOPEDIC SURGERY       PICC INSERTION Right 03/16/2017    5fr DL BioFlo PICC, 42cm (3cm external) in the R medial brachial vein w/ tip in the SVC RA junction.     PICC INSERTION Left 09/23/2017    5fr DL BioFlo PICC, 45cm (1cm external) in the L basilic vein w/ tip in the SVC RA junction.     PICC INSERTION Right 05/16/2019    5Fr - 43cm, Medial brachial vein, low SVC     PICC INSERTION Right 10/02/2019    5Fr - 43cm (2cm external), basilic vein, low SVC     SHAYLEE EN Y BOWEL  2003     SOFT TISSUE SURGERY       THORACIC SURGERY       TONSILLECTOMY       TRANSESOPHAGEAL ECHOCARDIOGRAM INTRAOPERATIVE N/A 1/8/2019    Procedure: TRANSESOPHAGEAL  ECHOCARDIOGRAM INTRAOPERATIVE;  Surgeon: GENERIC ANESTHESIA PROVIDER;  [facility]       Past Medical History:   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     Complication of anesthesia      Difficulty swallowing      Gastric ulcer, unspecified as acute or chronic, without mention of hemorrhage, perforation, or obstruction      Gastro-oesophageal reflux disease      Head injury      Hiatal hernia      Other bladder disorder      Other chronic pain      PONV (postoperative nausea and vomiting)      Severe malnutrition (H)     TPN     Short gut syndrome      Tobacco abuse        Social History:   Social History     Tobacco Use     Smoking status: Current Some Day Smoker     Types: Cigarettes     Smokeless tobacco: Never Used     Tobacco comment: 2/4/2021    smokes 3 cigarettes/day   Substance Use Topics     Alcohol use: No     Comment: quit 2002       Family History:   Family History   Problem Relation Age of Onset     Gastrointestinal Disease Mother         Crohns disease     Anxiety Disorder Mother      Thyroid Disease Mother         Grave's disease     Cancer Father         ear cancer-skin cancer/melanoma     Breast Cancer Maternal Grandmother      Macular Degeneration Maternal Grandfather      Anxiety Disorder Sister      Diabetes Maternal Uncle      Breast Cancer Other      Hypertension No family hx of      Hyperlipidemia No family hx of      Cerebrovascular Disease No family hx of      Prostate Cancer No family hx of      Depression No family hx of      Anesthesia Reaction No family hx of      Asthma No family hx of      Osteoporosis No family hx of      Genetic Disorder No family hx of      Obesity No family hx of      Mental Illness No family  "hx of      Substance Abuse No family hx of      Glaucoma No family hx of        Allergies:   Allergies   Allergen Reactions     Bactrim [Sulfamethoxazole W/Trimethoprim] Rash     Penicillins Anaphylaxis     Please see Antimicrobial Management Team allergy assessment note 10/10/2018. Patient reported tolerating amoxicillin.     Doxycycline Rash     Vancomycin Rash     Rash after receiving vancomycin 3/28/16 (red man's?). Tolerated with slower infusion and diphenhydramine premed.       Active Medications:   Current Outpatient Medications   Medication Sig Dispense Refill     acetaminophen (TYLENOL) 32 mg/mL liquid Take 15.65 mLs (500 mg) by mouth every 4 hours as needed for fever or mild pain 455 mL 1     albuterol (PROAIR HFA/PROVENTIL HFA/VENTOLIN HFA) 108 (90 Base) MCG/ACT inhaler Inhale 2 puffs into the lungs every 6 hours as needed       amphetamine-dextroamphetamine (ADDERALL) 20 MG tablet TAKE ONE TABLET BY MOUTH ONCE DAILY 30 tablet 0     bisacodyl (DULCOLAX) 5 MG EC tablet Take as directed. Two days before the exam take 2 tablets at bedtime. One day before the exam take 2 tablets at 3:00pm. 4 tablet 0     carvedilol (COREG) 6.25 MG tablet Take 6.25 mg by mouth 2 times daily (with meals)       cyanocobalamin (CYANOCOBALAMIN) 1000 MCG/ML injection INJECT 1 ML INTO THE MUSCLE EVERY 30 DAYS 1 mL 3     diphenhydrAMINE (BENADRYL) 12.5 MG/5ML syrup Take 25 mg by mouth every 4 hours as needed for itching, allergies or sleep 237 mL 0     EPINEPHrine (ANY BX GENERIC EQUIV) 0.3 MG/0.3ML injection 2-pack        [facility]NA[facility]ED PATIENT Contact [facility] for patient specific medication information at [phone] on admission and discharge from the hospital.  Phones are answered 24 hours a day 7 days a week 365 days a year.    Providers - Choose \"CONTINUE HOME MED (no script)\" at discharge if patient treatment with home infusion will continue.       fentaNYL (DURAGESIC) 25 mcg/hr 72 hr patch " "Place 1 patch onto the skin every 48 hours remove old patch.  May fill 07/06/21 and start 07/08/21 15 patch 0     insulin syringe-needle U-100 (29G X 1/2\" 1 ML) 29G X 1/2\" 1 ML miscellaneous Use to inject b12 every 30 days 3 each 4     Lidocaine (LIDOCARE) 4 % Patch Place 1 patch onto the skin every 24 hours To prevent lidocaine toxicity, patient should be patch free for 12 hrs daily. 30 patch 0     LORazepam (ATIVAN) 1 MG tablet TAKE 1 TABLET (1MG) BY MOUTH AS NEEDED FOR ANXIETY WITH TPN AND MEDICATIONS . JUST ONCE A DAY (30 TO LAST 30 DAYS) 30 tablet 0     naloxone (NARCAN) 4 MG/0.1ML nasal spray Spray 1 spray (4 mg) into one nostril alternating nostrils as needed for opioid reversal every 2-3 minutes until assistance arrives 0.2 mL 0     nicotine (COMMIT) 2 MG lozenge Place 2 mg inside cheek every hour as needed       nicotine (NICODERM CQ) 7 MG/24HR 24 hr patch Place 1 patch onto the skin every 24 hours       nystatin (MYCOSTATIN) 541987 UNIT/GM external cream Apply topically 2 times daily 30 g 0     ondansetron (ZOFRAN-ODT) 8 MG ODT tab DISSOLVE ONE TABLET ON TONGUE EVERY 8 HOURS AS NEEDED FOR NAUSEA 90 tablet 1     oxyCODONE (ROXICODONE) 5 MG/5ML solution Take 5-10 mLs (5-10 mg) by mouth 3 times daily as needed for pain Max of 30mg/day. Put at least 4 hours between doses. Fill 07/06/21 to start on/after 07/08/21. 30 day supply. 900 mL 0     pantoprazole (PROTONIX) 40 MG EC tablet Take 1 tablet (40 mg) by mouth daily (Patient taking differently: Take 40 mg by mouth as needed ) 30 tablet 5     parenteral nutrition - PTA/DISCHARGE ORDER Patient receives 2050 mL TPN every 14 hours through PICC at [facility].       polyethylene glycol (GOLYTELY) 236 g suspension Take as directed. One day before exam fill the jug with water. Cover and shake until well mixed. At 6:00pm start drinking an 8oz glass of mixture every 15 minutes until jug is 1/2 empty. Store the rest in the refrigerator. Day of exam 6 hours " "before exam drink the other 1/2 of jug until it is gone 4000 mL 0     polyethylene glycol (MIRALAX) 17 g packet Take 1 packet by mouth 2 times daily       QUEtiapine (SEROQUEL) 25 MG tablet Take 25 mg by mouth At Bedtime       sodium chloride 0.45% SOLN BOLUS Inject 2,000 mLs into the vein daily as needed for other (dehydration) 2000 mL 0     sucralfate (CARAFATE) 1 GM/10ML suspension TAKE 10MLS  BY MOUTH FOUR TIMES A DAY AS NEEDED 420 mL 1     VENTOLIN  (90 Base) MCG/ACT inhaler INHALE TWO PUFFS BY MOUTH EVERY 4 HOURS AS NEEDED FOR SHORTNESS OF BREATH /DYSPNEA OR WHEEZING 18 g 1     vitamin D2 (ERGOCALCIFEROL) 43131 units (1250 mcg) capsule Take 1 capsule (50,000 Units) by mouth once a week 12 capsule 3       Systemic Review:   CONSTITUTIONAL: NEGATIVE for fever, chills, change in weight  ENT/MOUTH: NEGATIVE for ear, mouth and throat problems  RESP: NEGATIVE for significant cough or SOB  CV: NEGATIVE for chest pain, palpitations or peripheral edema    Physical Examination:   Vital Signs: BP (!) 134/91   Pulse 52   Temp 97.5  F (36.4  C) (Temporal)   Resp 16   Ht 1.803 m (5' 11\")   Wt 91.6 kg (202 lb)   SpO2 100%   BMI 28.17 kg/m    GENERAL: healthy, alert and no distress  NECK: no adenopathy, no asymmetry, masses, or scars  RESP: lungs clear to auscultation - no rales, rhonchi or wheezes  CV: regular rate and rhythm, normal S1 S2, no S3 or S4, no murmur, click or rub, no peripheral edema and peripheral pulses strong  ABDOMEN: soft, nontender, no hepatosplenomegaly, no masses and bowel sounds normal  MS: no gross musculoskeletal defects noted, no edema    Plan: Appropriate to proceed as scheduled.      Jimbo Estrada MD  7/14/2021    PCP:  Chuy Isaac    "

## 2021-07-15 ENCOUNTER — TELEPHONE (OUTPATIENT)
Dept: GASTROENTEROLOGY | Facility: CLINIC | Age: 51
End: 2021-07-15

## 2021-07-15 ENCOUNTER — MYC MEDICAL ADVICE (OUTPATIENT)
Dept: INTERNAL MEDICINE | Facility: CLINIC | Age: 51
End: 2021-07-15

## 2021-07-15 NOTE — TELEPHONE ENCOUNTER
"2nd schedule attempt - per Dr. Estrada:     \"Pt needs repeat colonoscopy with MAC with double goyltely prep. Any GI provider ok\"     Procedure: Lower Endoscopy   Lower Endoscopy Type: Colonoscopy   Purpose of Colonoscopy Procedure: Diagnostic + strep bovus bacteremia   Colonoscopy reason for referral: + strep bovus bacteremia. Needs repeat with 2 day extended prep (golytely)   Colonoscopy Sedation: Deep/MAC   Timeframe Requested: Routine: Next available opening   Priority: Routine   Preferred Location: [facility]   Scheduling Instructions: If you have not heard from the scheduling office within 2 business days, please call [phone].         Associated Diagnoses     Bacteremia [R78.81]  - Primary     "

## 2021-07-16 ENCOUNTER — TELEPHONE (OUTPATIENT)
Dept: GASTROENTEROLOGY | Facility: CLINIC | Age: 51
End: 2021-07-16

## 2021-07-16 NOTE — TELEPHONE ENCOUNTER
"3rd schedule attempt - per Dr. Estrada: - letter sent    \"Pt needs repeat colonoscopy with MAC with double goyltely prep. Any GI provider ok\"     Procedure: Lower Endoscopy   Lower Endoscopy Type: Colonoscopy   Purpose of Colonoscopy Procedure: Diagnostic + strep bovus bacteremia   Colonoscopy reason for referral: + strep bovus bacteremia. Needs repeat with 2 day extended prep (golytely)   Colonoscopy Sedation: Deep/MAC   Timeframe Requested: Routine: Next available opening   Priority: Routine   Preferred Location: [facility]   Scheduling Instructions: If you have not heard from the scheduling office within 2 business days, please call [phone].         Associated Diagnoses     Bacteremia [R78.81]  - Primary     "

## 2021-07-19 ENCOUNTER — MYC REFILL (OUTPATIENT)
Dept: INTERNAL MEDICINE | Facility: CLINIC | Age: 51
End: 2021-07-19

## 2021-07-19 DIAGNOSIS — F90.0 ADHD (ATTENTION DEFICIT HYPERACTIVITY DISORDER), INATTENTIVE TYPE: ICD-10-CM

## 2021-07-19 RX ORDER — LORAZEPAM 1 MG/1
TABLET ORAL
Qty: 30 TABLET | Refills: 0 | Status: SHIPPED | OUTPATIENT
Start: 2021-07-19 | End: 2021-08-15

## 2021-07-19 RX ORDER — DEXTROAMPHETAMINE SACCHARATE, AMPHETAMINE ASPARTATE, DEXTROAMPHETAMINE SULFATE AND AMPHETAMINE SULFATE 5; 5; 5; 5 MG/1; MG/1; MG/1; MG/1
TABLET ORAL
Qty: 30 TABLET | Refills: 0 | Status: SHIPPED | OUTPATIENT
Start: 2021-07-19 | End: 2021-08-15

## 2021-07-19 NOTE — TELEPHONE ENCOUNTER
Requested Prescriptions   Pending Prescriptions Disp Refills     LORazepam (ATIVAN) 1 MG tablet 30 tablet 0     Sig: TAKE 1 TABLET (1MG) BY MOUTH AS NEEDED FOR ANXIETY WITH TPN AND MEDICATIONS . JUST ONCE A DAY (30 TO LAST 30 DAYS)      Last Written Prescription Date:  06/21/2021  Last Fill Quantity: 30,   # refills: 0  Last Office Visit: 06/08/2021  Future Office visit:    Next 5 appointments (look out 90 days)    Aug 11, 2021 12:45 PM  Return Visit with Patti Milligan MD  [facility] ([facility] ) [address] [address]  [phone]           Routing refill request to provider for review/approval because:  Drug not on the [facility] or  [facility] refill protocol or controlled substance              amphetamine-dextroamphetamine (ADDERALL) 20 MG tablet 30 tablet 0     Sig: TAKE ONE TABLET BY MOUTH ONCE DAILY     Last Written Prescription Date:  06/21/2021  Last Fill Quantity: 30,   # refills: 0  Last Office Visit: 06/08/2021  Future Office visit:    Next 5 appointments (look out 90 days)    Aug 11, 2021 12:45 PM  Return Visit with Patti Milligan MD  [facility] ([facility] ) [address] [address]  [phone]           Routing refill request to provider for review/approval because:  Drug not on the [facility] or [facility] refill protocol or controlled substance

## 2021-07-20 ENCOUNTER — HOME INFUSION (PRE-WILLOW HOME INFUSION) (OUTPATIENT)
Dept: PHARMACY | Facility: CLINIC | Age: 51
End: 2021-07-20

## 2021-07-20 ENCOUNTER — HOSPITAL ENCOUNTER (OUTPATIENT)
Facility: AMBULATORY SURGERY CENTER | Age: 51
End: 2021-07-20
Attending: INTERNAL MEDICINE
Payer: COMMERCIAL

## 2021-07-20 ENCOUNTER — TELEPHONE (OUTPATIENT)
Dept: GASTROENTEROLOGY | Facility: OUTPATIENT CENTER | Age: 51
End: 2021-07-20

## 2021-07-20 DIAGNOSIS — Z11.59 ENCOUNTER FOR SCREENING FOR OTHER VIRAL DISEASES: ICD-10-CM

## 2021-07-20 NOTE — TELEPHONE ENCOUNTER
Screening Questions  1. Are you active on mychart? yes    2. What insurance is in the chart? bcbs    2.  Ordering/Referring Provider: emmy frazier    3. BMI 28.17    4. Are you on daily home oxygen? no    5. Do you have a history of difficult airway? no    6. Have you had a heart, lung, or liver transplant? no    7. Are you currently on dialysis? no    8. Have you had a stroke or Transient ischemic atttack (TIA) within 6 months? no    9. In the past 6 months, have you had any heart related issues including cardiomyopathy or heart attack?         If yes, did it require cardiac stenting or other implantable device?no    10. Do you have any implantable devices in your body (pacemaker, defib, LVAD)? no    11. Do you take nitroglycerin? If yes, how often? no    12. Are you currently taking any blood thinners?no    13. Are you a diabetic? no    14. (Females) Are you currently pregnant?   If yes, how many weeks?    15. Have you had a procedure in the past that was difficult to tolerate with conscious sedation? Any allergies to Fentanyl or Versed no    16. Are you taking any scheduled prescription narcotics more than once daily? Oxycodone and fentanyl    17. Do you have any chemical dependencies such as alcohol, street drugs, or methadone? no    18. Do you have any history of post-traumatic stress syndrome or mental health issues? no    19. Do you transfer independently? yes    20.  Do you have any issues with constipation? Chronic opiod usage    21. Preferred Pharmacy for Pre Prescription [facility] [place]    Scheduling Details    Colonoscopy Prep Sent?: extended  Procedure Scheduled: colonoscopy  Provider/Surgeon: karin  Date of Procedure: 09/01/21  Location: [facility]  Caller (Please ask for phone number if not scheduled by patient): stan duffy wife      Sedation Type: mac  Conscious Sedation- Needs  for 6 hours after the procedure  MAC/General-Needs  for 24 hours after procedure    Pre-op Required  at [facility] and OR for MAC sedation:   (if yes advise patient they will need a pre-op prior to procedure)      Is patient on blood thinners? -no (If yes- inform patient to follow up with PCP or provider for follow up instructions)     Informed patient they will need an adult  yes  Cannot take any type of public or medical transportation alone    Informed Patient of COVID Test Requirement yes    Confirmed Nurse will call to complete assessment yes    Additional comments:

## 2021-07-22 ENCOUNTER — HOME INFUSION (PRE-WILLOW HOME INFUSION) (OUTPATIENT)
Dept: PHARMACY | Facility: CLINIC | Age: 51
End: 2021-07-22

## 2021-07-22 VITALS — WEIGHT: 179 LBS | BODY MASS INDEX: 25.06 KG/M2 | HEIGHT: 71 IN

## 2021-07-22 NOTE — PRE-PROCEDURE
Procedure Name: PICC Placement  Date/Time: 3/27/2020 8:44 AM  Written consent obtained?: Yes  Risks and benefits: Risks, benefits and alternatives were discussed  Consent given by: patient  Expected level of sedation: moderate  ASA Class: Class 2- mild systemic disease, no acute problems, no functional limitations  Mallampati: Grade 1- soft palate, uvula, tonsillar pillars, and posterior pharyngeal wall visible and Grade 2- soft palate, base of uvula, tonsillar pillars, and portion of posterior pharyngeal wall visible  Patient states understanding of procedure being performed: Yes  Patient's understanding of procedure matches consent: Yes  Procedure consent matches procedure scheduled: Yes  Appropriately NPO: yes  Lungs: lungs clear with good breath sounds bilaterally  Heart: normal heart sounds and rate  History & Physical reviewed: History and physical reviewed and no updates needed  Statement of review: I have reviewed the lab findings, diagnostic data, medications, and the plan for sedation

## 2021-07-22 NOTE — PROGRESS NOTES
"Progress Notes by Astrid Bennett CNP at 3/27/2020  8:00 AM     Author: Donald, Astrid, CNP Service: Interventional Radiology Author Type: Nurse Practitioner    Filed: 3/27/2020  8:09 AM Date of Service: 3/27/2020  8:00 AM Status: Signed    : Astrid Bennett CNP (Nurse Practitioner)         Interventional Radiology -Pre-procedure Note  3/27/2020    Procedure Requested: PICC placement  Requesting Provider: Anita Méndez PA-C    HPI: Parker Acevedo is a 49 y.o. male with short gut syndrome and malnutrition; now with reports of a PICC that fell out. Request for IR to place a new double lumen PICC to allow access for TPN and blood; sedation for the procedure requested. Per the patient PICC placements can be painful due to the scar tissue he has in his veins.     Imaging:   Has had prior hickmans, ports, and piccs.     NPO Status: midnight  Anticoagulation/Antiplatelets/Bleeding tendencies: none  Antibiotics: none for IR    Review of Systems: Denies fever, chills, shortness of breath, cough, N/V.     PMH:  No past medical history on file.   See scanned H&P    PSH:  No past surgical history on file.  See scanned H&P    ALLERGIES  Penicillins; Bactrim [sulfamethoxazole-trimethoprim]; Doxycycline; and Vancomycin    MEDICATIONS:  No current outpatient medications on file prior to encounter.     No current facility-administered medications on file prior to encounter.        LABS:  No labs required.      EXAM:  /65   Pulse 72   Temp 98  F (36.7  C) (Oral)   Resp 16   Ht 5' 11\" (1.803 m)   Wt 179 lb (81.2 kg)   SpO2 98%   BMI 24.97 kg/m    General: Stable. In no acute distress  Neuro: A&O x3.  Moves all extremities equally.  Resp: Lungs CTA bilaterally.  Cardio: S1S2 and reg, without murmur, clicks or rubs.  Skin:  Without excoriations, ecchymosis, erythema, lesions or open sores on BUE.     Pre-Sedation Assessment:  Mallampati Airway Classification: Class 1: entire tonsil clearly visble  Previous " reaction to anesthesia/sedation: no  Sedation plan based on assessment: Moderate  Sleep Apnea: no  Dentures: no  COPD: no  ASA Classification: ASA 3 - Patient with moderate systemic disease with functional limitations    ASSESSMENT:  49 y.o male in need of a double lumen PICC.     PLAN:    Double lumen PICC placement with sedation.     The procedure, risks and moderate sedation were discussed with patient, all questions answered and patient agrees to proceed with the procedure.  Written consent obtained.      Astrid Bennett  Interventional Radiology  [phone]

## 2021-07-22 NOTE — PRE-PROCEDURE
Risks and benefits: Risks, benefits and alternatives were discussed  Consent given by: patient  Expected level of sedation: moderate  ASA Class: Class 3- Severe systemic disease, definite functional limitations  Mallampati: Grade 1- soft palate, uvula, tonsillar pillars, and posterior pharyngeal wall visible  Patient states understanding of procedure being performed: Yes  Patient's understanding of procedure matches consent: Yes  Procedure consent matches procedure scheduled: Yes  Appropriately NPO: yes  Lungs: lungs clear with good breath sounds bilaterally  Heart: normal heart sounds and rate  History & Physical reviewed: History and physical reviewed and no updates needed  Statement of review: I have reviewed the lab findings, diagnostic data, medications, and the plan for sedation

## 2021-07-22 NOTE — PROCEDURES
[facility]    Procedure: PICC placement    Date/Time: 3/27/2020 8:45 AM  Performed by: Oliver Cline MD  Authorized by: Oliver Cline MD       Universal Protocol    Site marked: Yes    Prior images obtained and reviewed: Yes    Required items: required blood products, implants, devices, and special equipment available    Patient identity confirmed: verbally with patient    Reevaluation: Patient was reevaluated immediately before administering moderate or deep sedation or anesthesia    Confirmation checklist: patient's identity using two indicators, relevant allergies, procedure was appropriate and matched the consent or emergent situation and correct equipment/implants were available    Time out: Immediately prior to procedure a time out was called to verify the correct patient, procedure, equipment, support staff and site/side marked as required    Universal Protocol: Joint Commission Universal Protocol was followed    Preparation: Patient was prepped and draped in the usual sterile fashion    ESBL (mL): 5    Anesthesia    Local anesthesia used?: Yes    Anesthesia: local infiltration    Local anesthetic: lidocaine 1% without epinephrine    Anesthetic total (mL): 8    Sedation    Patient sedation: Yes    Sedation type: moderate (conscious) sedation    Sedation: fentanyl, midazolam and see MAR for details    Vital signs: Vital signs monitored during sedation    Post-procedure    Description of procedure: Successful placement DL PICC via right basilic vein.  49 cm.  Tip is at the cavoatrial junction.  Flushed and ready to use.    Patient tolerance: Patient tolerated the procedure well with no immediate complications   Length of time physician present for 1:1 monitoring during sedation: 20

## 2021-07-22 NOTE — PROGRESS NOTES
This is a recent snapshot of the patient's [facility] medical record.  For current drug dose and complete information and questions, call [phone] or In Basket pool, fv home infusion ([id number])  CSN Number:  [id number]

## 2021-07-23 NOTE — PROGRESS NOTES
This is a recent snapshot of the patient's [facility] medical record.  For current drug dose and complete information and questions, call [phone] or In Basket pool, fv home infusion ([id number])  CSN Number:  [id number]

## 2021-07-26 ENCOUNTER — PATIENT OUTREACH (OUTPATIENT)
Dept: CARE COORDINATION | Facility: CLINIC | Age: 51
End: 2021-07-26

## 2021-07-26 NOTE — PROGRESS NOTES
Clinic Care Coordination Contact  [facility]/Voicemail       Clinical Data: Care Coordinator Outreach  Outreach attempted x 2.  Left message on patient's voicemail with call back information and requested return call.  Plan:  Care Coordinator will try to reach patient again in 10 business days.      Dianelys KHAN, RN, PHN, [facility]  Primary Clinic Care Coordination    [facility]  Primary Care Clinics  [email or web address]   Office: [phone]  Employed by [facility]

## 2021-07-27 ENCOUNTER — MYC REFILL (OUTPATIENT)
Dept: PALLIATIVE MEDICINE | Facility: CLINIC | Age: 51
End: 2021-07-27

## 2021-07-27 ENCOUNTER — LAB REQUISITION (OUTPATIENT)
Dept: LAB | Facility: CLINIC | Age: 51
End: 2021-07-27
Payer: COMMERCIAL

## 2021-07-27 DIAGNOSIS — G89.4 CHRONIC PAIN SYNDROME: ICD-10-CM

## 2021-07-27 DIAGNOSIS — T80.29XS: ICD-10-CM

## 2021-07-27 DIAGNOSIS — G89.29 CHRONIC ABDOMINAL PAIN: ICD-10-CM

## 2021-07-27 DIAGNOSIS — R10.9 CHRONIC ABDOMINAL PAIN: ICD-10-CM

## 2021-07-27 LAB
ALBUMIN SERPL-MCNC: 3.3 G/DL (ref 3.4–5)
ALP SERPL-CCNC: 64 U/L (ref 40–150)
ALT SERPL W P-5'-P-CCNC: 23 U/L (ref 0–70)
ANION GAP SERPL CALCULATED.3IONS-SCNC: 5 MMOL/L (ref 3–14)
AST SERPL W P-5'-P-CCNC: 13 U/L (ref 0–45)
BILIRUB SERPL-MCNC: 0.9 MG/DL (ref 0.2–1.3)
BUN SERPL-MCNC: 12 MG/DL (ref 7–30)
CALCIUM SERPL-MCNC: 7.9 MG/DL (ref 8.5–10.1)
CHLORIDE BLD-SCNC: 112 MMOL/L (ref 94–109)
CO2 SERPL-SCNC: 27 MMOL/L (ref 20–32)
CREAT SERPL-MCNC: 0.64 MG/DL (ref 0.66–1.25)
ERYTHROCYTE [DISTWIDTH] IN BLOOD BY AUTOMATED COUNT: 13.7 % (ref 10–15)
GFR SERPL CREATININE-BSD FRML MDRD: >90 ML/MIN/1.73M2
GLUCOSE BLD-MCNC: 62 MG/DL (ref 70–99)
HCT VFR BLD AUTO: 36.7 % (ref 40–53)
HGB BLD-MCNC: 11.9 G/DL (ref 13.3–17.7)
IRON SERPL-MCNC: 106 UG/DL (ref 35–180)
MAGNESIUM SERPL-MCNC: 2.1 MG/DL (ref 1.6–2.3)
MCH RBC QN AUTO: 31.1 PG (ref 26.5–33)
MCHC RBC AUTO-ENTMCNC: 32.4 G/DL (ref 31.5–36.5)
MCV RBC AUTO: 96 FL (ref 78–100)
PHOSPHATE SERPL-MCNC: 2.1 MG/DL (ref 2.5–4.5)
PLATELET # BLD AUTO: 210 10E3/UL (ref 150–450)
POTASSIUM BLD-SCNC: 3.6 MMOL/L (ref 3.4–5.3)
PROT SERPL-MCNC: 6.2 G/DL (ref 6.8–8.8)
RBC # BLD AUTO: 3.83 10E6/UL (ref 4.4–5.9)
SODIUM SERPL-SCNC: 144 MMOL/L (ref 133–144)
WBC # BLD AUTO: 6.6 10E3/UL (ref 4–11)

## 2021-07-27 PROCEDURE — 83540 ASSAY OF IRON: CPT | Performed by: SURGERY

## 2021-07-27 PROCEDURE — 84100 ASSAY OF PHOSPHORUS: CPT | Performed by: SURGERY

## 2021-07-27 PROCEDURE — 36415 COLL VENOUS BLD VENIPUNCTURE: CPT | Mod: ORL | Performed by: SURGERY

## 2021-07-27 PROCEDURE — 85027 COMPLETE CBC AUTOMATED: CPT | Mod: ORL | Performed by: SURGERY

## 2021-07-27 PROCEDURE — 80053 COMPREHEN METABOLIC PANEL: CPT | Performed by: SURGERY

## 2021-07-27 PROCEDURE — 83735 ASSAY OF MAGNESIUM: CPT | Performed by: SURGERY

## 2021-07-28 ENCOUNTER — HOME INFUSION (PRE-WILLOW HOME INFUSION) (OUTPATIENT)
Dept: PHARMACY | Facility: CLINIC | Age: 51
End: 2021-07-28

## 2021-07-28 RX ORDER — OXYCODONE HCL 5 MG/5 ML
5-10 SOLUTION, ORAL ORAL 3 TIMES DAILY PRN
Qty: 900 ML | Refills: 0 | Status: SHIPPED | OUTPATIENT
Start: 2021-07-28 | End: 2021-08-25

## 2021-07-28 RX ORDER — FENTANYL 25 UG/1
1 PATCH TRANSDERMAL
Qty: 15 PATCH | Refills: 0 | Status: SHIPPED | OUTPATIENT
Start: 2021-07-28 | End: 2021-08-25

## 2021-07-28 NOTE — TELEPHONE ENCOUNTER
Medication refill information reviewed.     Due date for fentaNYL (DURAGESIC) 25 mcg/hr 72 hr patch and oxyCODONE (ROXICODONE) 5 MG/5ML solution is 08/07/21     Prescriptions prepped for review.     Will route to provider.

## 2021-07-28 NOTE — TELEPHONE ENCOUNTER
Script Eprescribed to pharmacy  [place] Prescription Monitoring Program checked    Will send this to MA team to notify patient.    Signed Prescriptions:                        Disp   Refills    oxyCODONE (ROXICODONE) 5 MG/5ML solution   900 mL 0        Sig: Take 5-10 mLs (5-10 mg) by mouth 3 times daily as           needed for pain Max of 30mg/day. Put at least 4           hours between doses. Fill 08/05/21 and start           08/07/2. 30 day supply.  Authorizing Provider: BRANDYN JENKINS    fentaNYL (DURAGESIC) 25 mcg/hr 72 hr patch 15 pat*0        Sig: Place 1 patch onto the skin every 48 hours remove old           patch.  May fill 08/05/21 and start 08/07/21  Authorizing Provider: BRANDYN JENKINS MD  [facility]

## 2021-07-28 NOTE — TELEPHONE ENCOUNTER
Received call from patient requesting refill(s) of fentaNYL (DURAGESIC) 25 mcg/hr 72 hr patch and oxyCODONE (ROXICODONE) 5 MG/5ML solution    Last dispensed from pharmacy on 07/06/21    Patient's last office/virtual visit by prescribing provider on 05/12/21  Next office/virtual appointment scheduled for 08/11/21    Last urine drug screen date 01/13/21  Current opioid agreement on file (completed within the last year) Yes Date of opioid agreement: 01/05/21    E-prescribe to pharmacy-[facility] - [place], [place] - [address]     Will route to nursing pool for review and preparation of prescription(s).

## 2021-07-28 NOTE — TELEPHONE ENCOUNTER
Refills have been requested for the following medications:         oxyCODONE (ROXICODONE) 5 MG/5ML solution [Patti Milligan MD]         fentaNYL (DURAGESIC) 25 mcg/hr 72 hr patch [Patti Milligan MD]     Preferred pharmacy: [address]

## 2021-07-28 NOTE — PROGRESS NOTES
This is a recent snapshot of the patient's [facility] medical record.  For current drug dose and complete information and questions, call [phone] or In Basket pool, fv home infusion ([id number])  CSN Number:  [id number]

## 2021-07-29 NOTE — TELEPHONE ENCOUNTER
[employer] message sent with Rx approval from provider.  Stephon  Pain Clinic Management Team

## 2021-08-06 NOTE — PROGRESS NOTES
This is a recent snapshot of the patient's [facility] medical record.  For current drug dose and complete information and questions, call [phone] or In Basket pool, fv home infusion ([id number])  CSN Number:  [id number]

## 2021-08-09 ENCOUNTER — MYC MEDICAL ADVICE (OUTPATIENT)
Dept: INTERNAL MEDICINE | Facility: CLINIC | Age: 51
End: 2021-08-09

## 2021-08-09 ENCOUNTER — IMMUNIZATION (OUTPATIENT)
Dept: FAMILY MEDICINE | Facility: CLINIC | Age: 51
End: 2021-08-09
Attending: FAMILY MEDICINE
Payer: COMMERCIAL

## 2021-08-09 ENCOUNTER — TELEPHONE (OUTPATIENT)
Dept: INTERNAL MEDICINE | Facility: CLINIC | Age: 51
End: 2021-08-09

## 2021-08-09 DIAGNOSIS — Z98.84 S/P BARIATRIC SURGERY: ICD-10-CM

## 2021-08-09 PROCEDURE — 91301 PR COVID VAC MODERNA 100 MCG/0.5 ML IM: CPT

## 2021-08-09 PROCEDURE — 0012A PR COVID VAC MODERNA 100 MCG/0.5 ML IM: CPT

## 2021-08-09 RX ORDER — PANTOPRAZOLE SODIUM 40 MG/1
40 TABLET, DELAYED RELEASE ORAL DAILY
Qty: 30 TABLET | Refills: 5 | Status: SHIPPED | OUTPATIENT
Start: 2021-08-09 | End: 2022-08-22

## 2021-08-09 RX ORDER — PANTOPRAZOLE SODIUM 40 MG/1
40 TABLET, DELAYED RELEASE ORAL DAILY
Qty: 30 TABLET | Refills: 5 | OUTPATIENT
Start: 2021-08-09

## 2021-08-09 NOTE — TELEPHONE ENCOUNTER
Reason for Call:  Form, our goal is to have forms completed with 72 hours, however, some forms may require a visit or additional information.    Type of letter, form or note:  Home Health Certification    Who is the form from?: Home care    Where did the form come from: form was faxed in    What clinic location was the form placed at?: [facility]     Where the form was placed: Given to MA/RN on Zhaopin's desk    What number is listed as a contact on the form?:        Additional comments:     Call taken on 8/9/2021 at 12:28 PM by Teetee Parada

## 2021-08-09 NOTE — TELEPHONE ENCOUNTER
"  Requested Prescriptions   Pending Prescriptions Disp Refills     pantoprazole (PROTONIX) 40 MG EC tablet 30 tablet 5     Sig: Take 1 tablet (40 mg) by mouth daily       PPI Protocol Passed - 8/9/2021  5:19 PM        Passed - Not on Clopidogrel (unless Pantoprazole ordered)        Passed - No diagnosis of osteoporosis on record        Passed - Recent (12 mo) or future (30 days) visit within the authorizing provider's specialty     Patient has had an office visit with the authorizing provider or a provider within the authorizing providers department within the previous 12 mos or has a future within next 30 days. See \"Patient Info\" tab in inbasket, or \"Choose Columns\" in Meds & Orders section of the refill encounter.              Passed - Medication is active on med list        Passed - Patient is age 18 or older           Prescription approved per [facility] Refill Protocol.  Ella Hammond RN    "

## 2021-08-10 ENCOUNTER — LAB REQUISITION (OUTPATIENT)
Dept: LAB | Facility: CLINIC | Age: 51
End: 2021-08-10
Payer: COMMERCIAL

## 2021-08-10 ENCOUNTER — HOME INFUSION (PRE-WILLOW HOME INFUSION) (OUTPATIENT)
Dept: PHARMACY | Facility: CLINIC | Age: 51
End: 2021-08-10

## 2021-08-10 DIAGNOSIS — R13.10 DYSPHAGIA, UNSPECIFIED: ICD-10-CM

## 2021-08-10 LAB
ALBUMIN SERPL-MCNC: 3.4 G/DL (ref 3.4–5)
ALP SERPL-CCNC: 111 U/L (ref 40–150)
ALT SERPL W P-5'-P-CCNC: 89 U/L (ref 0–70)
ANION GAP SERPL CALCULATED.3IONS-SCNC: 5 MMOL/L (ref 3–14)
AST SERPL W P-5'-P-CCNC: 48 U/L (ref 0–45)
BASOPHILS # BLD AUTO: 0 10E3/UL (ref 0–0.2)
BASOPHILS NFR BLD AUTO: 0 %
BILIRUB SERPL-MCNC: 0.9 MG/DL (ref 0.2–1.3)
BUN SERPL-MCNC: 14 MG/DL (ref 7–30)
CALCIUM SERPL-MCNC: 8.5 MG/DL (ref 8.5–10.1)
CHLORIDE BLD-SCNC: 107 MMOL/L (ref 94–109)
CO2 SERPL-SCNC: 29 MMOL/L (ref 20–32)
CREAT SERPL-MCNC: 0.78 MG/DL (ref 0.66–1.25)
EOSINOPHIL # BLD AUTO: 0.1 10E3/UL (ref 0–0.7)
EOSINOPHIL NFR BLD AUTO: 1 %
ERYTHROCYTE [DISTWIDTH] IN BLOOD BY AUTOMATED COUNT: 15.1 % (ref 10–15)
GFR SERPL CREATININE-BSD FRML MDRD: >90 ML/MIN/1.73M2
GLUCOSE BLD-MCNC: 89 MG/DL (ref 70–99)
HCT VFR BLD AUTO: 36.4 % (ref 40–53)
HGB BLD-MCNC: 11.6 G/DL (ref 13.3–17.7)
IMM GRANULOCYTES # BLD: 0.1 10E3/UL
IMM GRANULOCYTES NFR BLD: 0 %
LYMPHOCYTES # BLD AUTO: 0.9 10E3/UL (ref 0.8–5.3)
LYMPHOCYTES NFR BLD AUTO: 6 %
MAGNESIUM SERPL-MCNC: 2 MG/DL (ref 1.6–2.3)
MCH RBC QN AUTO: 30.9 PG (ref 26.5–33)
MCHC RBC AUTO-ENTMCNC: 31.9 G/DL (ref 31.5–36.5)
MCV RBC AUTO: 97 FL (ref 78–100)
MONOCYTES # BLD AUTO: 1.5 10E3/UL (ref 0–1.3)
MONOCYTES NFR BLD AUTO: 10 %
NEUTROPHILS # BLD AUTO: 12.6 10E3/UL (ref 1.6–8.3)
NEUTROPHILS NFR BLD AUTO: 83 %
NRBC # BLD AUTO: 0 10E3/UL
NRBC BLD AUTO-RTO: 0 /100
PHOSPHATE SERPL-MCNC: 3.1 MG/DL (ref 2.5–4.5)
PLATELET # BLD AUTO: 154 10E3/UL (ref 150–450)
POTASSIUM BLD-SCNC: 3.4 MMOL/L (ref 3.4–5.3)
PROT SERPL-MCNC: 6.6 G/DL (ref 6.8–8.8)
RBC # BLD AUTO: 3.75 10E6/UL (ref 4.4–5.9)
SODIUM SERPL-SCNC: 141 MMOL/L (ref 133–144)
WBC # BLD AUTO: 15.1 10E3/UL (ref 4–11)

## 2021-08-10 PROCEDURE — 80053 COMPREHEN METABOLIC PANEL: CPT | Mod: ORL | Performed by: SURGERY

## 2021-08-10 PROCEDURE — 87186 SC STD MICRODIL/AGAR DIL: CPT | Performed by: INTERNAL MEDICINE

## 2021-08-10 PROCEDURE — 87149 DNA/RNA DIRECT PROBE: CPT | Mod: ORL | Performed by: INTERNAL MEDICINE

## 2021-08-10 PROCEDURE — 83735 ASSAY OF MAGNESIUM: CPT | Performed by: SURGERY

## 2021-08-10 PROCEDURE — 85025 COMPLETE CBC W/AUTO DIFF WBC: CPT | Mod: ORL | Performed by: SURGERY

## 2021-08-10 PROCEDURE — 84100 ASSAY OF PHOSPHORUS: CPT | Mod: ORL | Performed by: SURGERY

## 2021-08-10 PROCEDURE — 36415 COLL VENOUS BLD VENIPUNCTURE: CPT | Performed by: SURGERY

## 2021-08-11 ENCOUNTER — VIRTUAL VISIT (OUTPATIENT)
Dept: PALLIATIVE MEDICINE | Facility: CLINIC | Age: 51
End: 2021-08-11
Payer: COMMERCIAL

## 2021-08-11 ENCOUNTER — HOME INFUSION (PRE-WILLOW HOME INFUSION) (OUTPATIENT)
Dept: PHARMACY | Facility: CLINIC | Age: 51
End: 2021-08-11

## 2021-08-11 ENCOUNTER — TELEPHONE (OUTPATIENT)
Dept: PALLIATIVE MEDICINE | Facility: CLINIC | Age: 51
End: 2021-08-11

## 2021-08-11 DIAGNOSIS — G89.29 CHRONIC BILATERAL LOW BACK PAIN WITHOUT SCIATICA: ICD-10-CM

## 2021-08-11 DIAGNOSIS — R10.9 CHRONIC ABDOMINAL PAIN: ICD-10-CM

## 2021-08-11 DIAGNOSIS — G89.4 CHRONIC PAIN SYNDROME: Primary | ICD-10-CM

## 2021-08-11 DIAGNOSIS — M79.18 MYOFASCIAL PAIN: ICD-10-CM

## 2021-08-11 DIAGNOSIS — M54.2 CERVICALGIA: ICD-10-CM

## 2021-08-11 DIAGNOSIS — M54.50 CHRONIC BILATERAL LOW BACK PAIN WITHOUT SCIATICA: ICD-10-CM

## 2021-08-11 DIAGNOSIS — G89.29 CHRONIC ABDOMINAL PAIN: ICD-10-CM

## 2021-08-11 PROCEDURE — 99212 OFFICE O/P EST SF 10 MIN: CPT | Mod: 95 | Performed by: PSYCHIATRY & NEUROLOGY

## 2021-08-11 RX ORDER — LIDOCAINE 50 MG/G
1-2 PATCH TOPICAL EVERY 24 HOURS
Qty: 60 PATCH | Refills: 6 | Status: SHIPPED | OUTPATIENT
Start: 2021-08-11 | End: 2023-02-06

## 2021-08-11 RX ORDER — POLYETHYLENE GLYCOL 3350 17 G/17G
17 POWDER, FOR SOLUTION ORAL DAILY
Qty: 1020 G | Refills: 3 | Status: SHIPPED | OUTPATIENT
Start: 2021-08-11 | End: 2023-02-06

## 2021-08-11 ASSESSMENT — PAIN SCALES - GENERAL: PAINLEVEL: MODERATE PAIN (5)

## 2021-08-11 NOTE — TELEPHONE ENCOUNTER
Central Prior Authorization Team  Phone: [phone]    PA Initiation    Medication: Lidocaine 5% patch  Insurance Company: [employer] [place] - Phone [phone] Fax [phone]  Pharmacy [place]illing the Rx[place] [facility] [place] [address] [place] [address]  Filling Pharmacy Phone: [phone]  Filling Pharmacy Fax:    Start Date: 8/11/2021

## 2021-08-11 NOTE — PROGRESS NOTES
Parker is a 50 year old who is being evaluated via a billable video visit.      How would you like to obtain your AVS? MyChart  If the video visit is dropped, the invitation should be resent by: Text to cell phone: [phone]  Will anyone else be joining your video visit? No   Is Pt currently in [place]? Yes    Lyndsay Solo MA          Video Start Time: 1246  Video-Visit Details    Type of service:  Video Visit    Video End Time:1256    Originating Location (pt. Location): Home    Distant Location (provider location):  [facility] clypd     Platform used for Video Visit: [facility]    Date of visit: 8/11/2021     Chief complaint:    Chief Complaint   Patient presents with     Pain     Video visit due to COVID-19        Interval history:  Parker Acevedo was last seen by me on 5/12/21    Recommendations/plan at the last visit included:  1. Physical therapy- not at this time   2. Medications: no changes  3. Follow up in 3 months-  in person    Since his last visit, Parker Acevedo reports:  -he has had COVID injection #2 MOnday and has had a fever since.  Also got bloodwork done to make sure no issue.  -prior to this, has been doing ok.  He is set up to do another colonoscopy as he wasn't fully prepped last time.  -no recent infections. He will be getting a replacement for a GI tube.      Pain scores:  Moderate Pain (5)     Current pain treatments:  Fentanyl 25mcg/hr patch q48 hours -  Previously was at 50mcg/hr  Lidocaine patches- as needed  Naloxone- has from previous hospitalization.  Oxycodone max of 30mg a day.-  miralax    Previous medication treatments included:   Valium 5 mg/day, 1 tab in AM and PM-stopped in 11/2017  Tylenol #3   Vicodin   Tramadol   Diazepam- for sleep/anxiety  Gabapentin- bad headaches, acid reflux  Oxycodone max of 45mg/day.        Side Effects: no side effects    Medications:  Current Outpatient Medications  "  Medication Sig Dispense Refill     acetaminophen (TYLENOL) 32 mg/mL liquid Take 15.65 mLs (500 mg) by mouth every 4 hours as needed for fever or mild pain 455 mL 1     albuterol (PROAIR HFA/PROVENTIL HFA/VENTOLIN HFA) 108 (90 Base) MCG/ACT inhaler Inhale 2 puffs into the lungs every 6 hours as needed       amphetamine-dextroamphetamine (ADDERALL) 20 MG tablet TAKE ONE TABLET BY MOUTH ONCE DAILY 30 tablet 0     bisacodyl (DULCOLAX) 5 MG EC tablet Take as directed. Two days before the exam take 2 tablets at bedtime. One day before the exam take 2 tablets at 3:00pm. 4 tablet 0     carvedilol (COREG) 6.25 MG tablet Take 6.25 mg by mouth 2 times daily (with meals)       cyanocobalamin (CYANOCOBALAMIN) 1000 MCG/ML injection INJECT 1 ML INTO THE MUSCLE EVERY 30 DAYS 1 mL 3     diphenhydrAMINE (BENADRYL) 12.5 MG/5ML syrup Take 25 mg by mouth every 4 hours as needed for itching, allergies or sleep 237 mL 0     EPINEPHrine (ANY BX GENERIC EQUIV) 0.3 MG/0.3ML injection 2-pack        [facility] MANAGED PATIENT Contact [facility] for patient specific medication information at [phone] on admission and discharge from the hospital.  Phones are answered 24 hours a day 7 days a week 365 days a year.    Providers - Choose \"CONTINUE HOME MED (no script)\" at discharge if patient treatment with home infusion will continue.       fentaNYL (DURAGESIC) 25 mcg/hr 72 hr patch Place 1 patch onto the skin every 48 hours remove old patch.  May fill 08/05/21 and start 08/07/21 15 patch 0     insulin syringe-needle U-100 (29G X 1/2\" 1 ML) 29G X 1/2\" 1 ML miscellaneous Use to inject b12 every 30 days 3 each 4     Lidocaine (LIDOCARE) 4 % Patch Place 1 patch onto the skin every 24 hours To prevent lidocaine toxicity, patient should be patch free for 12 hrs daily. 30 patch 0     LORazepam (ATIVAN) 1 MG tablet TAKE 1 TABLET (1MG) BY MOUTH AS NEEDED FOR ANXIETY WITH TPN AND MEDICATIONS . JUST ONCE A DAY (30 TO LAST 30 " DAYS) 30 tablet 0     naloxone (NARCAN) 4 MG/0.1ML nasal spray Spray 1 spray (4 mg) into one nostril alternating nostrils as needed for opioid reversal every 2-3 minutes until assistance arrives 0.2 mL 0     nystatin (MYCOSTATIN) 247065 UNIT/GM external cream Apply topically 2 times daily 30 g 0     ondansetron (ZOFRAN-ODT) 8 MG ODT tab DISSOLVE ONE TABLET ON TONGUE EVERY 8 HOURS AS NEEDED FOR NAUSEA 90 tablet 1     oxyCODONE (ROXICODONE) 5 MG/5ML solution Take 5-10 mLs (5-10 mg) by mouth 3 times daily as needed for pain Max of 30mg/day. Put at least 4 hours between doses. Fill 08/05/21 and start 08/07/2. 30 day supply. 900 mL 0     pantoprazole (PROTONIX) 40 MG EC tablet Take 1 tablet (40 mg) by mouth daily 30 tablet 5     parenteral nutrition - PTA/DISCHARGE ORDER Patient receives 2050 mL TPN every 14 hours through PICC at [facility].       polyethylene glycol (GOLYTELY) 236 g suspension Take as directed. One day before exam fill the jug with water. Cover and shake until well mixed. At 6:00pm start drinking an 8oz glass of mixture every 15 minutes until jug is 1/2 empty. Store the rest in the refrigerator. Day of exam 6 hours before exam drink the other 1/2 of jug until it is gone 4000 mL 0     polyethylene glycol (MIRALAX) 17 g packet Take 1 packet by mouth 2 times daily       QUEtiapine (SEROQUEL) 25 MG tablet Take 25 mg by mouth At Bedtime       sodium chloride 0.45% SOLN BOLUS Inject 2,000 mLs into the vein daily as needed for other (dehydration) 2000 mL 0     sucralfate (CARAFATE) 1 GM/10ML suspension TAKE 10MLS  BY MOUTH FOUR TIMES A DAY AS NEEDED 420 mL 1     VENTOLIN  (90 Base) MCG/ACT inhaler INHALE TWO PUFFS BY MOUTH EVERY 4 HOURS AS NEEDED FOR SHORTNESS OF BREATH /DYSPNEA OR WHEEZING 18 g 1     vitamin D2 (ERGOCALCIFEROL) 31720 units (1250 mcg) capsule Take 1 capsule (50,000 Units) by mouth once a week 12 capsule 3     nicotine (COMMIT) 2 MG lozenge Place 2 mg inside cheek every hour  as needed (Patient not taking: Reported on 8/11/2021)       nicotine (NICODERM CQ) 7 MG/24HR 24 hr patch Place 1 patch onto the skin every 24 hours (Patient not taking: Reported on 8/11/2021)         Medical History: any changes in medical history since they were last seen? As above    Physical exam:  There were no vitals taken for this visit.  Gen: awake, alert   Gait: forward flexed  MSK exam: poor posture.     THE 4 A's OF OPIOID MAINTENANCE ANALGESIA     Analgesia: adequate    Activity: on disability    Adverse effects: none    Adherence to Rx protocol: good- [place] Prescription Monitoring Program checked 8/11/2021  appropriate    Last UDS and opioid agreement - 1/5/21      Assessment:   1. Chronic abdominal pain, with history of gastric bypass and later peptic ulcer   2. G tube placement- only used for venting  3. Myofascial abdominal pain, with significant guarding and poor posture  4. Opioid tolerance  5. Anxiety  6. Foot pain with tendonous injury- healed  7. Left arm weakness, consistent with radial nerve injury- improving  8. Port placement- h/o recurrent infections, getting TPN    Plan:   1. Physical therapy- not at this time   2. Medications: no changes. Next visit, new Narcan. Refills provided for miralax and lidocaine  3. Follow up in 3 months-  in person      12 minutes spent on the date of encounter doing chart review, history, and exam documentation and further activities as noted above.     Jessica Milligan MD  [facility]

## 2021-08-11 NOTE — TELEPHONE ENCOUNTER
PA needed for: Lidocaine 5% patch  Insurance: [employer] part D  Insur phone: [phone]  Patient ID: [id number]  Please let us know when PA is granted/denied. Thank you!  Sharon Fair, Pharmacy Tech, [facility] [phone]

## 2021-08-12 ENCOUNTER — HOME INFUSION (PRE-WILLOW HOME INFUSION) (OUTPATIENT)
Dept: PHARMACY | Facility: CLINIC | Age: 51
End: 2021-08-12

## 2021-08-12 LAB
ENTEROCOCCUS FAECALIS: NOT DETECTED
ENTEROCOCCUS FAECIUM: NOT DETECTED
LISTERIA SPECIES (DETECTED/NOT DETECTED): NOT DETECTED
STAPHYLOCOCCUS AUREUS: NOT DETECTED
STAPHYLOCOCCUS EPIDERMIDIS: DETECTED
STAPHYLOCOCCUS LUGDUNENSIS: NOT DETECTED
STREPTOCOCCUS AGALACTIAE: NOT DETECTED
STREPTOCOCCUS ANGINOSUS GROUP: NOT DETECTED
STREPTOCOCCUS PNEUMONIAE: NOT DETECTED
STREPTOCOCCUS PYOGENES: NOT DETECTED
STREPTOCOCCUS SPECIES: NOT DETECTED

## 2021-08-12 NOTE — TELEPHONE ENCOUNTER
Prior Authorization Approval    Authorization Effective Date: 5/13/2021  Authorization Expiration Date: 8/11/2022  Medication: Lidocaine 5% patch- APPROVED   Approved Dose/Quantity:   Reference #:     Insurance Company: [employer] [place] - Phone [phone] Fax [phone]  Expected CoPay:       CoPay Card Available:      Foundation Assistance Needed:    Which Pharmacy is filling the prescription (Not needed for infusion/clinic administered): [facility] [place] [address] [place] [address]  Pharmacy Notified: Yes  Patient Notified:  **Instructed pharmacy to notify patient when script is ready to /ship.**

## 2021-08-13 ENCOUNTER — TELEPHONE (OUTPATIENT)
Dept: INFECTIOUS DISEASES | Facility: CLINIC | Age: 51
End: 2021-08-13

## 2021-08-13 ENCOUNTER — TELEPHONE (OUTPATIENT)
Dept: INTERNAL MEDICINE | Facility: CLINIC | Age: 51
End: 2021-08-13

## 2021-08-13 ENCOUNTER — HOME INFUSION (PRE-WILLOW HOME INFUSION) (OUTPATIENT)
Dept: PHARMACY | Facility: CLINIC | Age: 51
End: 2021-08-13

## 2021-08-13 LAB
BACTERIA BLD CULT: ABNORMAL
BACTERIA BLD CULT: ABNORMAL

## 2021-08-13 NOTE — TELEPHONE ENCOUNTER
Medication reconciliation completed by RN. Form and chart forwarded to PCP for signatures.  For MD Ella Mariscal RN

## 2021-08-13 NOTE — TELEPHONE ENCOUNTER
Infectious Diseases Telephone Note:   8/13/2021      Parker received his second Moderna vaccine on Monday. On Tuesday he developed a fever to 100.8. He is TPN dependent and has a Cm in place. He has a lengthy history of line-related infections, most frequently with coagulase negative staph. He had a CBC done as well as 3 blood cultures, one from each Cm port and a peripheral. His WBC returned at 15 and early on the morning on 8/12 one of the cultures from his line became positive for S epidermidis.     We were unable to reach Parker or Rose by phone or [employer] on 8/12. I was able to talk with Rose this afternoon. She reports that Parker's fever has resolved and he is actually feeling pretty good.     It is certainly possible that the fevers was secondary to to his vaccine and that the single positive culture with CoNS is a contaminant. It is harder to explain his leukocytosis which is not a common reaction to the vaccine, though possible.     I talked with his home care agency to see if we could get repeat labs today but the earliest they could get them is Monday. Given his history of bacteremias, I don't think we can safely wait until then for repeat labs.Therefore, I asked Rose and Parker to go to [facility] ED for a repeat CBC and blood cultures (from both ports and a peripheral). If he still has significant leukocytosis, he should likely be admitted for vancomycin while awaiting repeat blood cultures. If he is looking well clinically and his leukocytosis has improved, we can follow-up his blood cultures on the outpatient side.     Марина Buckner MD  Infectious Diseases    Pager [phone]

## 2021-08-14 ENCOUNTER — HOME INFUSION (PRE-WILLOW HOME INFUSION) (OUTPATIENT)
Dept: PHARMACY | Facility: CLINIC | Age: 51
End: 2021-08-14

## 2021-08-14 ENCOUNTER — HOSPITAL ENCOUNTER (EMERGENCY)
Facility: CLINIC | Age: 51
Discharge: HOME | End: 2021-08-14
Attending: FAMILY MEDICINE | Admitting: FAMILY MEDICINE
Payer: COMMERCIAL

## 2021-08-14 VITALS
HEART RATE: 66 BPM | RESPIRATION RATE: 18 BRPM | OXYGEN SATURATION: 100 % | SYSTOLIC BLOOD PRESSURE: 134 MMHG | TEMPERATURE: 97.7 F | DIASTOLIC BLOOD PRESSURE: 90 MMHG

## 2021-08-14 DIAGNOSIS — T50.Z95A VACCINATION REACTION, INITIAL ENCOUNTER: ICD-10-CM

## 2021-08-14 LAB
BASOPHILS # BLD AUTO: 0.1 10E3/UL (ref 0–0.2)
BASOPHILS NFR BLD AUTO: 1 %
EOSINOPHIL # BLD AUTO: 0.2 10E3/UL (ref 0–0.7)
EOSINOPHIL NFR BLD AUTO: 3 %
ERYTHROCYTE [DISTWIDTH] IN BLOOD BY AUTOMATED COUNT: 14.3 % (ref 10–15)
HCT VFR BLD AUTO: 37.3 % (ref 40–53)
HGB BLD-MCNC: 12.1 G/DL (ref 13.3–17.7)
HOLD SPECIMEN: NORMAL
HOLD SPECIMEN: NORMAL
IMM GRANULOCYTES # BLD: 0 10E3/UL
IMM GRANULOCYTES NFR BLD: 0 %
LYMPHOCYTES # BLD AUTO: 2.1 10E3/UL (ref 0.8–5.3)
LYMPHOCYTES NFR BLD AUTO: 31 %
MCH RBC QN AUTO: 31.2 PG (ref 26.5–33)
MCHC RBC AUTO-ENTMCNC: 32.4 G/DL (ref 31.5–36.5)
MCV RBC AUTO: 96 FL (ref 78–100)
MONOCYTES # BLD AUTO: 0.9 10E3/UL (ref 0–1.3)
MONOCYTES NFR BLD AUTO: 13 %
NEUTROPHILS # BLD AUTO: 3.6 10E3/UL (ref 1.6–8.3)
NEUTROPHILS NFR BLD AUTO: 52 %
NRBC # BLD AUTO: 0 10E3/UL
NRBC BLD AUTO-RTO: 0 /100
PLATELET # BLD AUTO: 189 10E3/UL (ref 150–450)
RBC # BLD AUTO: 3.88 10E6/UL (ref 4.4–5.9)
WBC # BLD AUTO: 6.8 10E3/UL (ref 4–11)

## 2021-08-14 PROCEDURE — 85004 AUTOMATED DIFF WBC COUNT: CPT | Performed by: FAMILY MEDICINE

## 2021-08-14 PROCEDURE — 99283 EMERGENCY DEPT VISIT LOW MDM: CPT

## 2021-08-14 PROCEDURE — 250N000011 HC RX IP 250 OP 636: Performed by: FAMILY MEDICINE

## 2021-08-14 PROCEDURE — 36415 COLL VENOUS BLD VENIPUNCTURE: CPT | Performed by: FAMILY MEDICINE

## 2021-08-14 PROCEDURE — 87149 DNA/RNA DIRECT PROBE: CPT | Performed by: FAMILY MEDICINE

## 2021-08-14 PROCEDURE — 36592 COLLECT BLOOD FROM PICC: CPT | Performed by: FAMILY MEDICINE

## 2021-08-14 PROCEDURE — 99282 EMERGENCY DEPT VISIT SF MDM: CPT | Performed by: FAMILY MEDICINE

## 2021-08-14 PROCEDURE — 87186 SC STD MICRODIL/AGAR DIL: CPT | Performed by: FAMILY MEDICINE

## 2021-08-14 RX ORDER — HEPARIN SODIUM,PORCINE 10 UNIT/ML
2-4 VIAL (ML) INTRAVENOUS EVERY 24 HOURS
Status: DISCONTINUED | OUTPATIENT
Start: 2021-08-14 | End: 2021-08-14 | Stop reason: HOSPADM

## 2021-08-14 RX ORDER — LIDOCAINE 40 MG/G
CREAM TOPICAL
Status: DISCONTINUED | OUTPATIENT
Start: 2021-08-14 | End: 2021-08-14 | Stop reason: HOSPADM

## 2021-08-14 RX ORDER — HEPARIN SODIUM,PORCINE 10 UNIT/ML
5-20 VIAL (ML) INTRAVENOUS EVERY 24 HOURS
Status: DISCONTINUED | OUTPATIENT
Start: 2021-08-14 | End: 2021-08-14 | Stop reason: HOSPADM

## 2021-08-14 RX ADMIN — HEPARIN, PORCINE (PF) 10 UNIT/ML INTRAVENOUS SYRINGE 5 ML: at 15:49

## 2021-08-14 NOTE — ED PROVIDER NOTES
History     Chief Complaint   Patient presents with     Abnormal Labs     HPI  Parker Acevedo is a 50 year old male who presents to the emergency department after being told by his infectious disease doctor to come in for repeat labs.  Patient was sent here because he had labs drawn yesterday that showed an elevated white count in 1 blood culture was positive which they thought might be a contaminant.  He had a fever a couple days ago but this was right after he received his COVID-19 vaccination.  He states yesterday he was fever free and feeling great and today he continues to feel great and have no fever either.  Patient has friends at home and is really anxious to get this blood and he wants to leave.      Summary of ID physician note from yesterday:  Infectious Diseases Telephone Note:   8/13/2021      Parker received his second Moderna vaccine on Monday. On Tuesday he developed a fever to 100.8. He is TPN dependent and has a Cm in place. He has a lengthy history of line-related infections, most frequently with coagulase negative staph. He had a CBC done as well as 3 blood cultures, one from each Cm port and a peripheral. His WBC returned at 15 and early on the morning on 8/12 one of the cultures from his line became positive for S epidermidis.      We were unable to reach Parker or Rose by phone or "[employer]"hart on 8/12. I was able to talk with Rose this afternoon. She reports that Parker's fever has resolved and he is actually feeling pretty good.      It is certainly possible that the fevers was secondary to to his vaccine and that the single positive culture with CoNS is a contaminant. It is harder to explain his leukocytosis which is not a common reaction to the vaccine, though possible.      I talked with his home care agency to see if we could get repeat labs today but the earliest they could get them is Monday. Given his history of bacteremias, I don't think we can safely wait until then for  repeat labs.Therefore, I asked Rose and Parker to go to the [facility] ED for a repeat CBC and blood cultures (from both ports and a peripheral). If he still has significant leukocytosis, he should likely be admitted for vancomycin while awaiting repeat blood cultures. If he is looking well clinically and his leukocytosis has improved, we can follow-up his blood cultures on the outpatient side.      Марина Buckner MD  Infectious Diseases    Pager [phone]    Allergies:  Allergies   Allergen Reactions     Bactrim [Sulfamethoxazole W/Trimethoprim] Rash     Penicillins Anaphylaxis     Please see Antimicrobial Management Team allergy assessment note 10/10/2018. Patient reported tolerating amoxicillin.     Doxycycline Rash     Vancomycin Rash     Rash after receiving vancomycin 3/28/16 (red man's?). Tolerated with slower infusion and diphenhydramine premed.       Problem List:    Patient Active Problem List    Diagnosis Date Noted     Fever, unknown origin 03/19/2021     Priority: Medium     Short bowel syndrome 01/30/2021     Priority: Medium     Bloodstream infection associated with central venous catheter, initial encounter 01/30/2021     Priority: Medium     Gastric outlet obstruction 10/07/2020     Priority: Medium     Added automatically from request for surgery [id number]       Gram-positive bacteremia 09/29/2019     Priority: Medium     On total parenteral nutrition 09/29/2019     Priority: Medium     Added automatically from request for surgery [id number]       PICC line infiltration, sequela 07/22/2019     Priority: Medium     Infection by Candida species 01/04/2019     Priority: Medium     Bacteremia 10/10/2018     Priority: Medium     Hyperlipidemia LDL goal <130 08/07/2018     Priority: Medium     S/P bariatric surgery 07/30/2018     Priority: Medium     Generalized weakness 01/30/2018     Priority: Medium     Catheter-related bloodstream infection (CRBSI) 09/23/2017     Priority: Medium     Low serum iron  09/08/2017     Priority: Medium     Anemia, iron deficiency 09/08/2017     Priority: Medium     Abnormal echocardiogram 04/11/2017     Priority: Medium     Port or reservoir infection, initial encounter 03/16/2017     Priority: Medium     Status post cervical spinal arthrodesis 02/15/2017     Priority: Medium     Short gut syndrome      Priority: Medium     Anxiety      Priority: Medium     Gastrostomy tube in place (H) 08/09/2016     Priority: Medium     Chronic nausea 05/10/2016     Priority: Medium     Fungemia 04/11/2016     Priority: Medium     Positive blood culture 03/29/2016     Priority: Medium     Insomnia 08/13/2015     Priority: Medium     Chronic pain 07/07/2015     Priority: Medium     Patient is followed by Dr. Milligan for ongoing prescription of pain medication.  All refills should be approved by this provider, or covering partner.    Medication(s): Fentanyl 50 mcg patches every three days/ Liquid oxycodone 5 mg/5ml up to 50 mg daily.   Maximum quantity per month: as per Dr. Milligan [facility]  Clinic visit frequency required: Q 3 months at [facility]    Controlled substance agreement on file: Yes       Date(s): Through Pain Management    Pain Clinic evaluation in the past: Yes       Date(s):  Ongoing every three months       Location(s):  Per pain management    DIRE Total Score(s):  No flowsheet data found.    Last [facility] website verification:  Through [facility]   [email or web address]    Esteban Daly MD             Health Care Home 02/24/2015     Priority: Medium              Iron deficiency 05/23/2014     Priority: Medium     Vitamin D deficiency 05/22/2014     Priority: Medium     Former smoker 02/24/2014     Priority: Medium     Bile reflux esophagitis 10/16/2013     Priority: Medium     Constipation 10/01/2013     Priority: Medium     Chronic anxiety 09/25/2013     Priority: Medium     Dysphagia 09/17/2013     Priority: Medium     Weight loss,  non-intentional 09/17/2013     Priority: Medium     Malnutrition (H) 09/17/2013     Priority: Medium     Dehydration 08/28/2013     Priority: Medium     Vitamin B12 deficiency without anemia 07/31/2013     Priority: Medium     Diagnosis updated by automated process. Provider to review and confirm.       Thiamine deficiency 07/31/2013     Priority: Medium     ADHD (attention deficit hyperactivity disorder), inattentive type 07/02/2013     Priority: Medium     Patient is followed by RICCO SHARP for ongoing prescription of stimulants.  All refills should be approved by this provider, or covering partner.    Medication(s): Adderall.   Maximum quantity per month: 30  Clinic visit frequency required:      Controlled substance agreement on file: No  Neuropsych evaluation for ADD completed:  No    Last [facility] website verification:  done on 5/6/19  [email or web address]         Anemia 09/20/2012     Priority: Medium     Vomiting 06/28/2012     Priority: Medium     Chronic abdominal pain 06/01/2012     Priority: Medium     Patient is followed by ALEXANDRIA WHITLEY for ongoing prescription of narcotic pain medicine.  Med: liquid oxycodone up to 60 mg per day.   Maximum use per month:   Expected duration: ongoing, hope per surgery that will resolve with upcoming surgery  Narcotic agreement on file: YES  Clinic visit recommended: Q 3 months         Peptic ulcer disease 04/08/2010     Priority: Medium     Gastric bypass status for obesity 04/08/2010     Priority: Medium     Top weight of 492.          Past Medical History:    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)      Chronic abdominal pain      CLABSI (central line-associated bloodstream infection)      Complication of anesthesia      Difficulty swallowing      Gastric ulcer, unspecified as acute or chronic, without mention of hemorrhage,  perforation, or obstruction      Gastro-oesophageal reflux disease      Head injury      Hiatal hernia      Other bladder disorder      Other chronic pain      PONV (postoperative nausea and vomiting)      Severe malnutrition (H)      Short gut syndrome      Tobacco abuse        Past Surgical History:    Past Surgical History:   Procedure Laterality Date     AMPUTATION       APPENDECTOMY       BACK SURGERY  11/3/2014    curve in the spine     BIOPSY LYMPH NODE CERVICAL N/A 2/20/2015    Procedure: BIOPSY LYMPH NODE CERVICAL;  Surgeon: Baron Scanlon MD;  [facility]: [facility]     C GASTRIC BYPASS,OBESE<100CM SHAYLEE-EN-Y  2002    lost 300 pounds     CHOLECYSTECTOMY       COLONOSCOPY N/A 7/14/2021    Procedure: COLONOSCOPY;  Surgeon: Jimbo Estrada MD;  Location: [facility] OR     DISCECTOMY, FUSION CERVICAL ANTERIOR ONE LEVEL, COMBINED N/A 2/15/2017    Procedure: COMBINED DISCECTOMY, FUSION CERVICAL ANTERIOR ONE LEVEL;  Surgeon: Darren Campos MD;  [facility]     ENDOSCOPIC INSERTION TUBE GASTROSTOMY  9/9/2013    Procedure: ENDOSCOPIC INSERTION TUBE GASTROSTOMY;;  Surgeon: Francis Vyas MD;  [facility]     ENDOSCOPIC ULTRASOUND UPPER GASTROINTESTINAL TRACT (GI)  4/29/2011    Procedure:ENDOSCOPIC ULTRASOUND UPPER GASTROINTESTINAL TRACT (GI); Both Procedures done Conjointly; Surgeon:NEREIDA HOUSER; [facility]     ENDOSCOPIC ULTRASOUND UPPER GASTROINTESTINAL TRACT (GI)  9/9/2013    Procedure: ENDOSCOPIC ULTRASOUND UPPER GASTROINTESTINAL TRACT (GI);  Endoscopic Ultrasound Guide Gastrostomy Tube Placement  C-arm;  Surgeon: Noe Lizarraga MD;  [facility]     ENDOSCOPIC ULTRASOUND UPPER GASTROINTESTINAL TRACT (GI) N/A 2/24/2021    Procedure: ENDOSCOPIC ULTRASOUND, ESOPHAGOSCOPY / UPPER GASTROINTESTINAL TRACT (GI), esophagastrogastroduodenoscopy;  Surgeon: Berny Bach MD;  [facility]     ENDOSCOPY  03/25/11    EGD, [facility]     ENDOSCOPY  08/04/09    Upper  Endoscopy, [facility]     ENDOSCOPY  01/05/09    Upper Endoscopy, [facility]     ESOPHAGOSCOPY, GASTROSCOPY, DUODENOSCOPY (EGD), COMBINED  4/20/2011    Procedure:COMBINED ESOPHAGOSCOPY, GASTROSCOPY, DUODENOSCOPY (EGD); Surgeon:BLU VYAS; [facility]     ESOPHAGOSCOPY, GASTROSCOPY, DUODENOSCOPY (EGD), COMBINED  6/15/2011    Procedure:COMBINED ESOPHAGOSCOPY, GASTROSCOPY, DUODENOSCOPY (EGD); Surgeon:BLU VYAS; [facility]     ESOPHAGOSCOPY, GASTROSCOPY, DUODENOSCOPY (EGD), COMBINED  6/12/2013    Procedure: COMBINED ESOPHAGOSCOPY, GASTROSCOPY, DUODENOSCOPY (EGD);;  Surgeon: Blu Vyas MD;  [facility]     ESOPHAGOSCOPY, GASTROSCOPY, DUODENOSCOPY (EGD), COMBINED  11/22/2013    Procedure: COMBINED ESOPHAGOSCOPY, GASTROSCOPY, DUODENOSCOPY (EGD);;  Surgeon: Blu Vyas MD;  [facility]     ESOPHAGOSCOPY, GASTROSCOPY, DUODENOSCOPY (EGD), COMBINED  4/30/2014    Procedure: COMBINED ESOPHAGOSCOPY, GASTROSCOPY, DUODENOSCOPY (EGD);  Surgeon: Blu Vyas MD;  [facility]     ESOPHAGOSCOPY, GASTROSCOPY, DUODENOSCOPY (EGD), COMBINED N/A 2/20/2015    Procedure: COMBINED ESOPHAGOSCOPY, GASTROSCOPY, DUODENOSCOPY (EGD), BIOPSY SINGLE OR MULTIPLE;  Surgeon: Baron Scanlon MD;  Location:  OR     ESOPHAGOSCOPY, GASTROSCOPY, DUODENOSCOPY (EGD), COMBINED N/A 9/30/2015    Procedure: COMBINED ESOPHAGOSCOPY, GASTROSCOPY, DUODENOSCOPY (EGD);  Surgeon: Blu Vyas MD;  [facility]     ESOPHAGOSCOPY, GASTROSCOPY, DUODENOSCOPY (EGD), COMBINED N/A 10/3/2019    Procedure: Upper Endoscopy;  Surgeon: Clif Morrow MD;  [facility]     GASTRECTOMY  6/22/2012    Procedure: GASTRECTOMY;  Open Approach, Excise Ulcers,Partial Gastrectomy, Esophagojejunostomy, Hiatal Hernia Repair, Extensive Lysis of Adhesions and Esaphagogastrodudenoscopy.;  Surgeon: Blu Vyas MD;  [facility]     GASTROJEJUNOSTOMY  08/26/09    Extensice  enterolysis, partial resect. jejunum, part. resect gastric pouch, gastrojejunostomy anastomosis     HC ESOPH/GAS REFLUX TEST W NASAL IMPED ELECTRODE  8/5/2013    Procedure: ESOPHAGEAL IMPEDENCE FUNCTION TEST 1 HOUR OR LESS;  Surgeon: Halie Lang MD;  [facility]     HEAD & NECK SURGERY  2/15/2017    C5-C6     HERNIA REPAIR  2006    Umbilical hernia     HERNIORRHAPHY HIATAL  6/22/2012    Procedure: HERNIORRHAPHY HIATAL;;  Surgeon: Francis Vyas MD;  [facility]     HERNIORRHAPHY INGUINAL  11/22/2013    Procedure: HERNIORRHAPHY INGUINAL;;  Surgeon: Francis Vyas MD;  [facility]     INSERT PICC LINE Right 12/19/2019    Procedure: Picc Placement;  Surgeon: Per Dumont PA-C;  [facility]     INSERT PICC LINE Right 2/21/2020    Procedure: INSERTION, PICC;  Surgeon: Per Dumont PA-C;  [facility]     INSERT PORT VASCULAR ACCESS Right 12/19/2017    Procedure: INSERT PORT VASCULAR ACCESS;  Right Chest Port Placement ;  Surgeon: Lisandro Alejandro PA-C;  [facility]     INSERT PORT VASCULAR ACCESS Right 8/2/2018    Procedure: INSERT PORT VASCULAR ACCESS;  Place single lumen tunneled central venous access catheter;  Surgeon: Guy Jamil PA-C;  [facility]     IR CVC TUNNEL PLACEMENT > 5 YRS OF AGE  8/7/2019     IR CVC TUNNEL PLACEMENT > 5 YRS OF AGE  4/14/2020     IR CVC TUNNEL PLACEMENT > 5 YRS OF AGE  8/3/2020     IR CVC TUNNEL PLACEMENT > 5 YRS OF AGE  9/4/2020     IR CVC TUNNEL PLACEMENT > 5 YRS OF AGE  2/5/2021     IR CVC TUNNEL PLACEMENT > 5 YRS OF AGE  3/23/2021     IR CVC TUNNEL REMOVAL RIGHT  10/1/2019     IR CVC TUNNEL REMOVAL RIGHT  7/30/2020     IR CVC TUNNEL REMOVAL RIGHT  9/2/2020     IR CVC TUNNEL REMOVAL RIGHT  2/3/2021     IR CVC TUNNEL REMOVAL RIGHT  3/19/2021     IR CVC TUNNEL REVISION RIGHT  5/7/2021     IR FOLLOW UP VISIT OUTPATIENT  8/7/2019     IR PICC PLACEMENT > 5 YRS OF AGE  3/7/2019     IR PICC PLACEMENT > 5 YRS  OF AGE  12/19/2019     IR PICC PLACEMENT > 5 YRS OF AGE  2/21/2020     LAPAROTOMY EXPLORATORY  11/22/2013    Procedure: LAPAROTOMY EXPLORATORY;  Exploratory Laparotomy, Upper Endoscopy, Left Inguinal Hernia Repair;  Surgeon: Francis Vyas MD;  [facility]     ORTHOPEDIC SURGERY       PICC INSERTION Right 03/16/2017    5fr DL BioFlo PICC, 42cm (3cm external) in the R medial brachial vein w/ tip in the SVC RA junction.     PICC INSERTION Left 09/23/2017    5fr DL BioFlo PICC, 45cm (1cm external) in the L basilic vein w/ tip in the SVC RA junction.     PICC INSERTION Right 05/16/2019    5Fr - 43cm, Medial brachial vein, low SVC     PICC INSERTION Right 10/02/2019    5Fr - 43cm (2cm external), basilic vein, low SVC     SHAYLEE EN Y BOWEL  2003     SOFT TISSUE SURGERY       THORACIC SURGERY       TONSILLECTOMY       TRANSESOPHAGEAL ECHOCARDIOGRAM INTRAOPERATIVE N/A 1/8/2019    Procedure: TRANSESOPHAGEAL ECHOCARDIOGRAM INTRAOPERATIVE;  Surgeon: GENERIC ANESTHESIA PROVIDER;  [facility]       Family History:    Family History   Problem Relation Age of Onset     Gastrointestinal Disease Mother         Crohns disease     Anxiety Disorder Mother      Thyroid Disease Mother         Grave's disease     Cancer Father         ear cancer-skin cancer/melanoma     Breast Cancer Maternal Grandmother      Macular Degeneration Maternal Grandfather      Anxiety Disorder Sister      Diabetes Maternal Uncle      Breast Cancer Other      Hypertension No family hx of      Hyperlipidemia No family hx of      Cerebrovascular Disease No family hx of      Prostate Cancer No family hx of      Depression No family hx of      Anesthesia Reaction No family hx of      Asthma No family hx of      Osteoporosis No family hx of      Genetic Disorder No family hx of      Obesity No family hx of      Mental Illness No family hx of      Substance Abuse No family hx of      Glaucoma No family hx of        Social History:  Marital Status:   " [2]  Social History     Tobacco Use     Smoking status: Current Some Day Smoker     Types: Cigarettes     Smokeless tobacco: Never Used     Tobacco comment: 2/4/2021    smokes 3 cigarettes/day   Substance Use Topics     Alcohol use: No     Comment: quit 2002     Drug use: No        Medications:    acetaminophen (TYLENOL) 32 mg/mL liquid  albuterol (PROAIR HFA/PROVENTIL HFA/VENTOLIN HFA) 108 (90 Base) MCG/ACT inhaler  amphetamine-dextroamphetamine (ADDERALL) 20 MG tablet  bisacodyl (DULCOLAX) 5 MG EC tablet  carvedilol (COREG) 6.25 MG tablet  cyanocobalamin (CYANOCOBALAMIN) 1000 MCG/ML injection  diphenhydrAMINE (BENADRYL) 12.5 MG/5ML syrup  EPINEPHrine (ANY BX GENERIC EQUIV) 0.3 MG/0.3ML injection 2-pack  [facility] INFUSION MANAGED PATIENT  fentaNYL (DURAGESIC) 25 mcg/hr 72 hr patch  insulin syringe-needle U-100 (29G X 1/2\" 1 ML) 29G X 1/2\" 1 ML miscellaneous  Lidocaine (LIDOCARE) 4 % Patch  lidocaine (LIDODERM) 5 % patch  LORazepam (ATIVAN) 1 MG tablet  naloxone (NARCAN) 4 MG/0.1ML nasal spray  nicotine (COMMIT) 2 MG lozenge  nicotine (NICODERM CQ) 7 MG/24HR 24 hr patch  nystatin (MYCOSTATIN) 045024 UNIT/GM external cream  ondansetron (ZOFRAN-ODT) 8 MG ODT tab  oxyCODONE (ROXICODONE) 5 MG/5ML solution  pantoprazole (PROTONIX) 40 MG EC tablet  parenteral nutrition - PTA/DISCHARGE ORDER  polyethylene glycol (GOLYTELY) 236 g suspension  polyethylene glycol (MIRALAX) 17 GM/Dose powder  QUEtiapine (SEROQUEL) 25 MG tablet  sodium chloride 0.45% SOLN BOLUS  sucralfate (CARAFATE) 1 GM/10ML suspension  VENTOLIN  (90 Base) MCG/ACT inhaler  vitamin D2 (ERGOCALCIFEROL) 17296 units (1250 mcg) capsule          Review of Systems   All other systems reviewed and are negative.      Physical Exam   BP: 133/79  Pulse: 63  Temp: 97.7  F (36.5  C)  Resp: 18  SpO2: 99 %      Physical Exam  Vitals and nursing note reviewed.   Constitutional:       General: He is not in acute distress.     Appearance: Normal appearance. " He is not ill-appearing.   HENT:      Nose: Nose normal.   Cardiovascular:      Rate and Rhythm: Normal rate and regular rhythm.   Musculoskeletal:         General: Normal range of motion.      Cervical back: Normal range of motion.   Skin:     General: Skin is warm and dry.      Capillary Refill: Capillary refill takes less than 2 seconds.      Findings: No rash.   Neurological:      Mental Status: He is alert.         ED Course        Procedures      Results for orders placed or performed during the hospital encounter of 08/14/21 (from the past 24 hour(s))   CBC with platelets differential    Narrative    The following orders were created for panel order CBC with platelets differential.  Procedure                               Abnormality         Status                     ---------                               -----------         ------                     CBC with platelets and d...[[id number]]  Abnormal            Final result                 Please view results for these tests on the individual orders.   CBC with platelets and differential   Result Value Ref Range    WBC Count 6.8 4.0 - 11.0 10e3/uL    RBC Count 3.88 (L) 4.40 - 5.90 10e6/uL    Hemoglobin 12.1 (L) 13.3 - 17.7 g/dL    Hematocrit 37.3 (L) 40.0 - 53.0 %    MCV 96 78 - 100 fL    MCH 31.2 26.5 - 33.0 pg    MCHC 32.4 31.5 - 36.5 g/dL    RDW 14.3 10.0 - 15.0 %    Platelet Count 189 150 - 450 10e3/uL    % Neutrophils 52 %    % Lymphocytes 31 %    % Monocytes 13 %    % Eosinophils 3 %    % Basophils 1 %    % Immature Granulocytes 0 %    NRBCs per 100 WBC 0 <1 /100    Absolute Neutrophils 3.6 1.6 - 8.3 10e3/uL    Absolute Lymphocytes 2.1 0.8 - 5.3 10e3/uL    Absolute Monocytes 0.9 0.0 - 1.3 10e3/uL    Absolute Eosinophils 0.2 0.0 - 0.7 10e3/uL    Absolute Basophils 0.1 0.0 - 0.2 10e3/uL    Absolute Immature Granulocytes 0.0 <=0.0 10e3/uL    Absolute NRBCs 0.0 10e3/uL   Extra Tube    Narrative    The following orders were created for panel order Extra  Tube.  Procedure                               Abnormality         Status                     ---------                               -----------         ------                     Extra Red Top Tube[[id number]]                               In process                 Extra Green Top (Lithium...[[id number]]                      In process                   Please view results for these tests on the individual orders.     *Note: Due to a large number of results and/or encounters for the requested time period, some results have not been displayed. A complete set of results can be found in Results Review.       Medications - No data to display     Patient's white count did come back normal.  Per the infectious disease recommendations we will go ahead and discharge the patient at this time.  I did get blood cultures as requested.  I think patient's recent fever was most likely from his COVID-19 vaccination as long as he remains fever free should continue to not have any problems.  Patient will be discharged at this time.    Assessments & Plan (with Medical Decision Making)  Vaccination reaction     I have reviewed the nursing notes.    I have reviewed the findings, diagnosis, plan and need for follow up with the patient.              8/14/2021   [facility] EMERGENCY DEPT     Danny, Oseas Downey MD  08/14/21 [id number]

## 2021-08-14 NOTE — DISCHARGE INSTRUCTIONS
I think that your recent fever was most likely from your vaccination.  As long as you remain without a fever I think things should continue to be fine.  Please contact your infectious disease doctor on Monday for follow-up.

## 2021-08-15 ENCOUNTER — MYC REFILL (OUTPATIENT)
Dept: INTERNAL MEDICINE | Facility: CLINIC | Age: 51
End: 2021-08-15

## 2021-08-15 ENCOUNTER — TELEPHONE (OUTPATIENT)
Dept: EMERGENCY MEDICINE | Facility: CLINIC | Age: 51
End: 2021-08-15

## 2021-08-15 ENCOUNTER — HOME INFUSION (PRE-WILLOW HOME INFUSION) (OUTPATIENT)
Dept: PHARMACY | Facility: CLINIC | Age: 51
End: 2021-08-15

## 2021-08-15 DIAGNOSIS — F90.0 ADHD (ATTENTION DEFICIT HYPERACTIVITY DISORDER), INATTENTIVE TYPE: ICD-10-CM

## 2021-08-16 ENCOUNTER — LAB REQUISITION (OUTPATIENT)
Dept: LAB | Facility: CLINIC | Age: 51
End: 2021-08-16
Payer: COMMERCIAL

## 2021-08-16 ENCOUNTER — PATIENT OUTREACH (OUTPATIENT)
Dept: FAMILY MEDICINE | Facility: CLINIC | Age: 51
End: 2021-08-16
Payer: COMMERCIAL

## 2021-08-16 ENCOUNTER — NURSE TRIAGE (OUTPATIENT)
Dept: NURSING | Facility: CLINIC | Age: 51
End: 2021-08-16

## 2021-08-16 ENCOUNTER — HOME INFUSION (PRE-WILLOW HOME INFUSION) (OUTPATIENT)
Dept: PHARMACY | Facility: CLINIC | Age: 51
End: 2021-08-16

## 2021-08-16 DIAGNOSIS — B95.7 OTHER STAPHYLOCOCCUS AS THE CAUSE OF DISEASES CLASSIFIED ELSEWHERE: ICD-10-CM

## 2021-08-16 DIAGNOSIS — T80.211D BLOODSTREAM INFECTION DUE TO CENTRAL VENOUS CATHETER, SUBSEQUENT ENCOUNTER: ICD-10-CM

## 2021-08-16 DIAGNOSIS — R78.81 BACTEREMIA: ICD-10-CM

## 2021-08-16 DIAGNOSIS — Z53.9 DIAGNOSIS NOT YET DEFINED: Primary | ICD-10-CM

## 2021-08-16 LAB
ACINETOBACTER SPECIES: NOT DETECTED
BACTERIA BLD CULT: NO GROWTH
BACTERIA BLD CULT: NO GROWTH
BASOPHILS # BLD AUTO: 0 10E3/UL (ref 0–0.2)
BASOPHILS NFR BLD AUTO: 1 %
CITROBACTER SPECIES: NOT DETECTED
CTX-M: NORMAL
ENTEROBACTER SPECIES: NOT DETECTED
EOSINOPHIL # BLD AUTO: 0.2 10E3/UL (ref 0–0.7)
EOSINOPHIL NFR BLD AUTO: 3 %
ERYTHROCYTE [DISTWIDTH] IN BLOOD BY AUTOMATED COUNT: 14.2 % (ref 10–15)
ESCHERICHIA COLI: NOT DETECTED
HCT VFR BLD AUTO: 36.8 % (ref 40–53)
HGB BLD-MCNC: 11.7 G/DL (ref 13.3–17.7)
IMM GRANULOCYTES # BLD: 0 10E3/UL
IMM GRANULOCYTES NFR BLD: 0 %
IMP: NORMAL
KLEBSIELLA OXYTOCA: NOT DETECTED
KLEBSIELLA PNEUMONIAE: NOT DETECTED
KPC: NORMAL
LYMPHOCYTES # BLD AUTO: 2.1 10E3/UL (ref 0.8–5.3)
LYMPHOCYTES NFR BLD AUTO: 31 %
MCH RBC QN AUTO: 31 PG (ref 26.5–33)
MCHC RBC AUTO-ENTMCNC: 31.8 G/DL (ref 31.5–36.5)
MCV RBC AUTO: 98 FL (ref 78–100)
MONOCYTES # BLD AUTO: 0.8 10E3/UL (ref 0–1.3)
MONOCYTES NFR BLD AUTO: 12 %
NDM: NORMAL
NEUTROPHILS # BLD AUTO: 3.5 10E3/UL (ref 1.6–8.3)
NEUTROPHILS NFR BLD AUTO: 53 %
NRBC # BLD AUTO: 0 10E3/UL
NRBC BLD AUTO-RTO: 0 /100
OXA (DETECTED/NOT DETECTED): NORMAL
PLATELET # BLD AUTO: 182 10E3/UL (ref 150–450)
PROTEUS SPECIES: NOT DETECTED
PSEUDOMONAS AERUGINOSA: NOT DETECTED
RBC # BLD AUTO: 3.77 10E6/UL (ref 4.4–5.9)
VIM: NORMAL
WBC # BLD AUTO: 6.8 10E3/UL (ref 4–11)

## 2021-08-16 PROCEDURE — 85025 COMPLETE CBC W/AUTO DIFF WBC: CPT | Performed by: INTERNAL MEDICINE

## 2021-08-16 PROCEDURE — G0179 MD RECERTIFICATION HHA PT: HCPCS | Performed by: INTERNAL MEDICINE

## 2021-08-16 PROCEDURE — 87040 BLOOD CULTURE FOR BACTERIA: CPT | Mod: ORL | Performed by: INTERNAL MEDICINE

## 2021-08-16 RX ORDER — LORAZEPAM 1 MG/1
TABLET ORAL
Qty: 30 TABLET | Refills: 0 | Status: SHIPPED | OUTPATIENT
Start: 2021-08-16 | End: 2021-09-13

## 2021-08-16 RX ORDER — DEXTROAMPHETAMINE SACCHARATE, AMPHETAMINE ASPARTATE, DEXTROAMPHETAMINE SULFATE AND AMPHETAMINE SULFATE 5; 5; 5; 5 MG/1; MG/1; MG/1; MG/1
TABLET ORAL
Qty: 30 TABLET | Refills: 0 | Status: SHIPPED | OUTPATIENT
Start: 2021-08-16 | End: 2021-09-13

## 2021-08-16 ASSESSMENT — ACTIVITIES OF DAILY LIVING (ADL): DEPENDENT_IADLS:: MEDICATION MANAGEMENT;TRANSPORTATION;SHOPPING

## 2021-08-16 NOTE — PROGRESS NOTES
"Clinic Care Coordination Contact    Clinic Care Coordination Contact  OUTREACH    Referral Information:  Referral Source: IP Report    Primary Diagnosis: SIRS/Sepsis    Chief Complaint   Patient presents with     Clinic Care Coordination - Post Hospital     RNCC follow up call-ED     Universal Utilization:   Clinic Utilization  Difficulty keeping appointments:: Yes  Compliance Concerns: Yes  No-Show Concerns: No  No PCP office visit in Past Year: No  Utilization    Hospital Admissions  6             ED Visits  4             No Show Count (past year)  3                Current as of: 8/16/2021  2:00 PM            Clinical Concerns:  Current Medical Concerns: Called and spoke with states Rose pt went to the ED because his blood culture came back \"abnormal\".  States they were told to go back to the ED and have the blood cultures redrawn.  Wife states she was frustrated because they had to wait a long time as the ED was full.  Due to the time of the day infusion was closed and that was not an option.  Pt was seen and had the cultures redrawn however, wife states the kelli who tata the blood only tata two tubes and not the usual three tubes (two ports and one arm draw).  In addition, wife states she was watching the nurse draw the cultures and he put the needle and things that should be sterile on a contaminated counter and reused it. He also set the needle on the pts arm before drawing the blood. She is concerned it is contaminated specimen. They were in touch with infectious disease and they want his cultures drawn again. They are suppose to be sending the bottles to the home and the home care RN will be drawing the cultures. Pt will follow up with infectious disease.   Current Behavioral Concerns: no current concerns    Education Provided to patient: continue to monitor for fevers   Pain  Pain (GOAL):: Yes  Health Maintenance Reviewed: Not assessed  Clinical Pathway: None    Medication Management:  Medication review " status: Not reviewed managed by home care.   Functional Status:  Dependent ADLs:: Ambulation-cane, Bathing  Dependent IADLs:: Medication Management, Transportation, Shopping  Bed or wheelchair confined:: No  Mobility Status: Independent    Living Situation:  Current living arrangement:: I live in a private home with spouse  Type of residence:: Private home - stairs    Lifestyle & Psychosocial Needs:    Social Determinants of Health     Tobacco Use: High Risk     Smoking Tobacco Use: Current Some Day Smoker     Smokeless Tobacco Use: Never Used   Alcohol Use: Not At Risk     Frequency of Alcohol Consumption: Never     Average Number of Drinks: Patient refused     Frequency of Binge Drinking: Never   Financial Resource Strain: Low Risk      Difficulty of Paying Living Expenses: Not hard at all   Food Insecurity: No Food Insecurity     Worried About Running Out of Food in the Last Year: Never true     Ran Out of Food in the Last Year: Never true   Transportation Needs: No Transportation Needs     Lack of Transportation (Medical): No     Lack of Transportation (Non-Medical): No   Physical Activity:      Days of Exercise per Week:      Minutes of Exercise per Session:    Stress:      Feeling of Stress :    Social Connections:      Frequency of Communication with Friends and Family:      Frequency of Social Gatherings with Friends and Family:      Attends [employer] Services:      Active Member of Clubs or Organizations:      Attends Club or Organization Meetings:      Marital Status:    Intimate Partner Violence: Not At Risk     Fear of Current or Ex-Partner: No     Emotionally Abused: No     Physically Abused: No     Sexually Abused: No   Depression: Not at risk     PHQ-2 Score: 0   Housing Stability:      Unable to Pay for Housing in the Last Year:      Number of Places Lived in the Last Year:      Unstable Housing in the Last Year:      Diet:: Regular  Inadequate nutrition (GOAL):: No  Tube Feeding: No  Inadequate  activity/exercise (GOAL):: No  Significant changes in sleep pattern (GOAL): No  Transportation means:: Regular car, Friend     Worship or spiritual beliefs that impact treatment:: No  Mental health DX:: Yes  Mental health management concern (GOAL):: No  Informal Support system:: Family, Spouse      Resources and Interventions:  Current Resources:      Community Resources: Home Infusion  Supplies used at home:: None     Advance Care Plan/Directive  Advanced Care Plans/Directives on file:: Yes  Type Advanced Care Plans/Directives: Advanced Directive - On File  Advanced Care Plan/Directive Status: Not Applicable    Referrals Placed: None     Goals        General     #2 Medical (pt-stated)      Goal Statement: I want to prevent hospital visits  Date Goal set: 9/9/2020   Barriers: on chronic TPN, IV line  Strengths: home infusion, care coordination  Date to Achieve By: 12/9/21  Patient expressed understanding of goal: yes  Action steps to achieve this goal:  1. I will complete IV antibiotics as prescribed. (completed)  2. I will follow up with infectious disease provider as scheduled 9/24/20. (completed)  3. I will stay at home during COVID-19 pandemic.   4. I will monitor my temperature daily as instructed and contact [facility] for elevated temperature parameters.  5. I will have G-tube replaced as scheduled. (completed)        Patient/Caregiver understanding: wife will contact home care if she has not heard from the by 3pm.     Outreach Frequency: monthly  Future Appointments              In 1 week  COVID LAB [facility]    In 2 weeks Francis Vyas MD [facility]          Plan:   RNCC will file a compass report  RNCC will follow up in 2-4 weeks.     Dianelys KHAN, RN, PHN, [facility]  Primary Clinic Care Coordination    [facility]  Primary Care Clinics  [email or web address]    Office: [phone]  Employed by [facility] [employer]

## 2021-08-16 NOTE — RESULT ENCOUNTER NOTE
Critical value note regarding Positive Blood culture    Information below copied and pasted below from ED Triage Notes   [facility] Emergency Provider/Nurse:Ab Loera,   Date and Time of call:  08/15/21 1400  Response/comment:  11:10 PM;  We received a call from the [facility] lab stating that this patient had a positive blood culture result.  They reported a gram-negative bacillus in his left arm culture.  I was not familiar with this patient but did look him up in the epic EMR.  I reviewed a emergency room visit note from 8/14/2021 in which the patient was seen and evaluated because of a prior positive blood culture.  He apparently had a normal white count and was feeling well and was discharged home to follow-up with his infectious disease physician.  I contacted the on-call infectious disease group for the [facility] and spoke with Koko Gustafson, infectious disease specialist at the [facility].  He stated that he did not know the patient but stated that typically they consider gram negative blood cultures significant and he felt that the patient should be reevaluated and have additional blood cultures drawn.     I called the patient's home and spoke to the patient's wife, Rose.  She stated that the patient was feeling well but did have a low-grade fever this afternoon.  It was recommended that he be reexamined in the ER and also they were encouraged to contact his infectious disease specialist, Dr. Марина Buckner.

## 2021-08-16 NOTE — PROGRESS NOTES
This is a recent snapshot of the patient's [facility] medical record.  For current drug dose and complete information and questions, call [phone] or In Basket pool, fv home infusion ([id number])  CSN Number:  [id number]

## 2021-08-16 NOTE — ED PROVIDER NOTES
11:10 PM  We received a call from the [facility] lab stating that this patient had a positive blood culture result.  They reported a gram-negative bacillus in his left arm culture.  I was not familiar with this patient but did look him up in the epic EMR.  I reviewed a emergency room visit note from 8/14/2021 in which the patient was seen and evaluated because of a prior positive blood culture.  He apparently had a normal white count and was feeling well and was discharged home to follow-up with his infectious disease physician.  I contacted the on-call infectious disease group for the [facility] and spoke with Koko Gustafson, infectious disease specialist at the [facility].  He stated that he did not know the patient but stated that typically they consider gram negative blood cultures significant and he felt that the patient should be reevaluated and have additional blood cultures drawn.    I called the patient's home and spoke to the patient's wife, Rose.  She stated that the patient was feeling well but did have a low-grade fever this afternoon.  It was recommended that he be reexamined in the ER and also they were encouraged to contact his infectious disease specialist, Dr. Марина Buckner.     Ab Devine,   08/15/21 7810

## 2021-08-16 NOTE — TELEPHONE ENCOUNTER
Addeall and Ativan       Last Written Prescription Date:  7/19/2021  Last Fill Quantity: 30,   # refills: 0  Last Office Visit:   2/21/2021  Future Office visit:  Next 5 appointments (look out 90 days)    Aug 29, 2021  1:00 PM  Pre-procedure Covid with [facility] COVID LAB  [facility] ([facility] ) [address] [place] [address] [id number] [phone]           Routing refill request to provider for review/approval because:  Drug not on the [facility], [facility] or [facility] refill protocol or controlled substance

## 2021-08-17 NOTE — TELEPHONE ENCOUNTER
Pt wife called in ask for the lab result.  The result is notified.  Care advice given per protocol.  Patient agrees with care advice given.   Agreed to call back if he has additional symptoms or questions.      Sharath Haskins Nurse Advisor 8/16/2021 11:01 PM

## 2021-08-17 NOTE — PROGRESS NOTES
This is a recent snapshot of the patient's [facility] medical record.  For current drug dose and complete information and questions, call [phone] or In Basket pool, fv home infusion ([id number])  CSN Number:  [id number]

## 2021-08-17 NOTE — PROGRESS NOTES
This is a recent snapshot of the patient's [facility] medical record.  For current drug dose and complete information and questions, call [phone] or In Basket pool, fv home infusion ([id number])  CSN Number:  [id number]

## 2021-08-18 ENCOUNTER — HOME INFUSION (PRE-WILLOW HOME INFUSION) (OUTPATIENT)
Dept: PHARMACY | Facility: CLINIC | Age: 51
End: 2021-08-18

## 2021-08-18 NOTE — PROGRESS NOTES
This is a recent snapshot of the patient's [facility] medical record.  For current drug dose and complete information and questions, call [phone] or In Basket pool, fv home infusion ([id number])  CSN Number:  [id number]

## 2021-08-19 ENCOUNTER — TELEPHONE (OUTPATIENT)
Dept: INFECTIOUS DISEASES | Facility: CLINIC | Age: 51
End: 2021-08-19

## 2021-08-19 LAB — BACTERIA BLD CULT: NO GROWTH

## 2021-08-19 NOTE — CONFIDENTIAL NOTE
8/19/2021   Infectious Diseases Telephone Note:     Parker continues to have low-grade fevers when his line is accessed that resolve when it is not accessed. Doing well otherwise. Most recent blood cultures have been negative at 48 hours. Prior to that he had one line culture positive for S epi and one peripheral culture on a different day with a very unusual species of Pseudomonas. These may have been contaminants but it's hard to say.     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 Discussed with Rose and with [facility]. We will try gent antibiotic locks x 1 week to see if symptoms improve. If they don't, more workup and likely line replacement will be necessary. If they do, we may consider longer duration of locks to see if we can salvage the line.     Марина Buckner MD  Division of Infectious Diseases and International Medicine  [facility]: [phone]

## 2021-08-20 ENCOUNTER — MYC MEDICAL ADVICE (OUTPATIENT)
Dept: INFECTIOUS DISEASES | Facility: CLINIC | Age: 51
End: 2021-08-20

## 2021-08-20 ENCOUNTER — HOME INFUSION (PRE-WILLOW HOME INFUSION) (OUTPATIENT)
Dept: PHARMACY | Facility: CLINIC | Age: 51
End: 2021-08-20

## 2021-08-20 ENCOUNTER — TELEPHONE (OUTPATIENT)
Dept: INFECTIOUS DISEASES | Facility: CLINIC | Age: 51
End: 2021-08-20

## 2021-08-20 ENCOUNTER — TELEPHONE (OUTPATIENT)
Dept: EMERGENCY MEDICINE | Facility: CLINIC | Age: 51
End: 2021-08-20

## 2021-08-20 LAB
BACTERIA BLD CULT: ABNORMAL

## 2021-08-20 NOTE — PROGRESS NOTES
This is a recent snapshot of the patient's [facility] medical record.  For current drug dose and complete information and questions, call [phone] or In Basket pool, fv home infusion ([id number])  CSN Number:  [id number]

## 2021-08-20 NOTE — TELEPHONE ENCOUNTER
[facility] Emergency Department/Urgent Care Lab result notification     Patient/parent Name  Rose    RN Assessment (Patient s current Symptoms), include time called.  [Insert Left message here if message left]  10:15 Spoke with wife Rose (consent to communicate noted in chart), she reports low grade fevers off and on only when something is being done with his port and this is not new. Reviewed blood culture results, patient is at his baseline doing well.  Patient has been followed up with his Infectious disease provider.  Will route to Dr. Марина Buckner for review.  Rose is comfortable with this.  RN Recommendations/Instructions per [facility] ED lab result protocol  Patient notified of lab result and treatment recommendations.   Please Contact your PCP clinic or return to the Emergency department if your:    Symptoms return.    Symptoms do not improve after 3 days on antibiotic.    Symptoms do not resolve after completing antibiotic.    Symptoms worsen or other concerning symptom's.        Collette Bell, CHRISTY  [facility] [employer] [place]  Emergency Dept Lab Result RN  Ph# [phone]

## 2021-08-20 NOTE — TELEPHONE ENCOUNTER
Will change to 1 week IV vancomycin instead which is covered by insurance. We will have them alternate lumens used to try to bathe both of them in the vancomycin. This will not cover the Pseudomonas which is most likely a contaminant since it grew from just one peripheral culture. The majority of Parker's previous pathogens would respond to vancomycin. Not as ideal as the gentamicin lock, but worth a try. Discussed with I. He does not need a first dose as he has had it in the past.     Марина Buckner MD  Division of Infectious Diseases and International Medicine  [facility]: [phone]

## 2021-08-20 NOTE — TELEPHONE ENCOUNTER
Parker Santana does not have coverage for abx locks at home.  Patient's spouse provided self-pay estimate of $47.41 per day, which is not possible for them to pay.    Please advise on plan to move forward.     Thank you!    Jyothi Peraza, PharmD [facility]   Ph: [phone]  Fax: [phone]

## 2021-08-20 NOTE — PROGRESS NOTES
This is a recent snapshot of the patient's [facility] medical record.  For current drug dose and complete information and questions, call [phone] or In Basket pool, fv home infusion ([id number])  CSN Number:  [id number]

## 2021-08-20 NOTE — TELEPHONE ENCOUNTER
[facility] Emergency [facility] Lab result notification:    [place] ED lab result protocol used  Blood Culture    Reason for call  Notify of lab results, assess symptoms,  review ED providers recommendations/discharge instructions (if necessary) and advise per ED lab result f/u protocol    Lab Result   Final BLOOD culture on 8/20/21 shows the presence of bacteria(s): Pseudomonas oryzihabitans (2) and Pseudomonas oryzihabitans (3)  [facility] Emergency Dept discharge antibiotic prescribed: None  Recommendations in Treatment per [facility] ED lab result Blood culture protocol     Information table from Emergency Dept Provider visit on 8/14/21  Symptoms reported at ED visit (Chief complaint, HPI)  Abnormal Labs      HPI  Parker Acevedo is a 50 year old male who presents to the emergency department after being told by his infectious disease doctor to come in for repeat labs.  Patient was sent here because he had labs drawn yesterday that showed an elevated white count in 1 blood culture was positive which they thought might be a contaminant.  He had a fever a couple days ago but this was right after he received his COVID-19 vaccination.  He states yesterday he was fever free and feeling great and today he continues to feel great and have no fever either.  Patient has friends at home and is really anxious to get this blood and he wants to leave.     ED providers Impression and Plan (applicable information) Patient's white count did come back normal.  Per the infectious disease recommendations we will go ahead and discharge the patient at this time.  I did get blood cultures as requested.  I think patient's recent fever was most likely from his COVID-19 vaccination as long as he remains fever free should continue to not have any problems.  Patient will be discharged at this time.   Miscellaneous information Patient has been follow up with his infectious disease provider     RN  "Assessment (Patient s current Symptoms), include time called.  [Insert Left message here if message left]  10:02  Left voicemail message requesting a call back to [facility] ED Lab Result RN at [phone].  RN is available every day between 9 a.m. and 5:30 p.m.          Collette Bell RN  [facility]  Emergency Dept Lab Result RN  Ph# [phone]     Copy of Lab result   Blood Culture Arm, Left  Order: [id number]  Status:  Final result   Visible to patient:  Yes (MyChart) Next appt:  08/29/2021 at 01:00 PM in Lab ([facility] COVID LAB)  Specimen Information: Arm, Left; Blood         2 Result Notes  Culture Positive on the 1st day of incubationAbnormal        Pseudomonas oryzihabitansPanic        Pseudomonas oryzihabitansPanic     1 of 2 bottles   Strain 2      Resulting Agency: [facility]   Susceptibility     Pseudomonas oryzihabitans (2) Pseudomonas oryzihabitans (3)     ELISA ELISA     Amikacin <=16 ug/mL Susceptible <=16 ug/mL Susceptible     Cefepime <=2 ug/mL Susceptible <=2 ug/mL Susceptible     Ceftazidime <=1 ug/mL Susceptible 2 ug/mL Susceptible     Ciprofloxacin <=1 ug/mL Susceptible <=1 ug/mL Susceptible     Gentamicin <=1 ug/mL Susceptible <=1 ug/mL Susceptible     Levofloxacin <=0.25 ug/mL Susceptible <=0.25 ug/mL Susceptible     Meropenem <=1 ug/mL Susceptible <=1 ug/mL Susceptible     Piperacillin/Tazobactam <=4 ug/mL Susceptible <=4 ug/mL Susceptible     Tobramycin <=1 ug/mL Susceptible <=1 ug/mL Susceptible             Susceptibility Comments    Pseudomonas oryzihabitans (2)   Antibiotics listed as \"No Interpretation\" have no regulatory guidelines for susceptibility/resistance available.         Specimen Collected: 08/14/21  3:42 PM Last Resulted: 08/20/21  9:44 AM               "

## 2021-08-21 LAB
BACTERIA BLD CULT: NO GROWTH

## 2021-08-23 NOTE — PROGRESS NOTES
This is a recent snapshot of the patient's [facility] medical record.  For current drug dose and complete information and questions, call [phone] or In Basket pool, fv home infusion ([id number])  CSN Number:  [id number]

## 2021-08-24 ENCOUNTER — TELEPHONE (OUTPATIENT)
Dept: GASTROENTEROLOGY | Facility: CLINIC | Age: 51
End: 2021-08-24

## 2021-08-24 ENCOUNTER — LAB REQUISITION (OUTPATIENT)
Dept: LAB | Facility: CLINIC | Age: 51
End: 2021-08-24
Payer: COMMERCIAL

## 2021-08-24 ENCOUNTER — HOME INFUSION (PRE-WILLOW HOME INFUSION) (OUTPATIENT)
Dept: PHARMACY | Facility: CLINIC | Age: 51
End: 2021-08-24

## 2021-08-24 DIAGNOSIS — Z12.11 SPECIAL SCREENING FOR MALIGNANT NEOPLASMS, COLON: Primary | ICD-10-CM

## 2021-08-24 DIAGNOSIS — R13.10 DYSPHAGIA, UNSPECIFIED: ICD-10-CM

## 2021-08-24 LAB
ALBUMIN SERPL-MCNC: 3.2 G/DL (ref 3.4–5)
ALP SERPL-CCNC: 59 U/L (ref 40–150)
ALT SERPL W P-5'-P-CCNC: 24 U/L (ref 0–70)
ANION GAP SERPL CALCULATED.3IONS-SCNC: 2 MMOL/L (ref 3–14)
AST SERPL W P-5'-P-CCNC: 12 U/L (ref 0–45)
BASOPHILS # BLD AUTO: 0.1 10E3/UL (ref 0–0.2)
BASOPHILS NFR BLD AUTO: 1 %
BILIRUB SERPL-MCNC: 0.3 MG/DL (ref 0.2–1.3)
BUN SERPL-MCNC: 18 MG/DL (ref 7–30)
CALCIUM SERPL-MCNC: 7.6 MG/DL (ref 8.5–10.1)
CHLORIDE BLD-SCNC: 115 MMOL/L (ref 94–109)
CO2 SERPL-SCNC: 27 MMOL/L (ref 20–32)
CREAT SERPL-MCNC: 0.68 MG/DL (ref 0.66–1.25)
CRP SERPL-MCNC: <2.9 MG/L (ref 0–8)
EOSINOPHIL # BLD AUTO: 0.3 10E3/UL (ref 0–0.7)
EOSINOPHIL NFR BLD AUTO: 5 %
ERYTHROCYTE [DISTWIDTH] IN BLOOD BY AUTOMATED COUNT: 14.3 % (ref 10–15)
FERRITIN SERPL-MCNC: 12 NG/ML (ref 26–388)
GFR SERPL CREATININE-BSD FRML MDRD: >90 ML/MIN/1.73M2
GLUCOSE BLD-MCNC: 86 MG/DL (ref 70–99)
HCT VFR BLD AUTO: 34 % (ref 40–53)
HGB BLD-MCNC: 10.8 G/DL (ref 13.3–17.7)
IMM GRANULOCYTES # BLD: 0 10E3/UL
IMM GRANULOCYTES NFR BLD: 0 %
IRON SATN MFR SERPL: 20 % (ref 15–46)
IRON SERPL-MCNC: 58 UG/DL (ref 35–180)
LYMPHOCYTES # BLD AUTO: 2.2 10E3/UL (ref 0.8–5.3)
LYMPHOCYTES NFR BLD AUTO: 34 %
MAGNESIUM SERPL-MCNC: 2.2 MG/DL (ref 1.6–2.3)
MCH RBC QN AUTO: 30.4 PG (ref 26.5–33)
MCHC RBC AUTO-ENTMCNC: 31.8 G/DL (ref 31.5–36.5)
MCV RBC AUTO: 96 FL (ref 78–100)
MONOCYTES # BLD AUTO: 0.8 10E3/UL (ref 0–1.3)
MONOCYTES NFR BLD AUTO: 12 %
NEUTROPHILS # BLD AUTO: 3.2 10E3/UL (ref 1.6–8.3)
NEUTROPHILS NFR BLD AUTO: 48 %
NRBC # BLD AUTO: 0 10E3/UL
NRBC BLD AUTO-RTO: 0 /100
PHOSPHATE SERPL-MCNC: 3.2 MG/DL (ref 2.5–4.5)
PLATELET # BLD AUTO: 178 10E3/UL (ref 150–450)
POTASSIUM BLD-SCNC: 3.5 MMOL/L (ref 3.4–5.3)
PROT SERPL-MCNC: 6.1 G/DL (ref 6.8–8.8)
RBC # BLD AUTO: 3.55 10E6/UL (ref 4.4–5.9)
SODIUM SERPL-SCNC: 144 MMOL/L (ref 133–144)
TIBC SERPL-MCNC: 290 UG/DL (ref 240–430)
VANCOMYCIN SERPL-MCNC: 7.2 MG/L
WBC # BLD AUTO: 6.6 10E3/UL (ref 4–11)

## 2021-08-24 PROCEDURE — 82728 ASSAY OF FERRITIN: CPT | Mod: ORL | Performed by: SURGERY

## 2021-08-24 PROCEDURE — 84100 ASSAY OF PHOSPHORUS: CPT | Mod: ORL | Performed by: SURGERY

## 2021-08-24 PROCEDURE — 80202 ASSAY OF VANCOMYCIN: CPT | Mod: ORL | Performed by: SURGERY

## 2021-08-24 PROCEDURE — 83735 ASSAY OF MAGNESIUM: CPT | Mod: ORL | Performed by: SURGERY

## 2021-08-24 PROCEDURE — 80053 COMPREHEN METABOLIC PANEL: CPT | Mod: ORL | Performed by: SURGERY

## 2021-08-24 PROCEDURE — 86140 C-REACTIVE PROTEIN: CPT | Mod: ORL | Performed by: SURGERY

## 2021-08-24 PROCEDURE — 83550 IRON BINDING TEST: CPT | Mod: ORL,GZ | Performed by: SURGERY

## 2021-08-24 PROCEDURE — 36592 COLLECT BLOOD FROM PICC: CPT | Mod: ORL | Performed by: SURGERY

## 2021-08-24 PROCEDURE — 84238 ASSAY NONENDOCRINE RECEPTOR: CPT | Mod: ORL | Performed by: SURGERY

## 2021-08-24 PROCEDURE — 85025 COMPLETE CBC W/AUTO DIFF WBC: CPT | Mod: ORL | Performed by: SURGERY

## 2021-08-24 RX ORDER — BISACODYL 5 MG
TABLET, DELAYED RELEASE (ENTERIC COATED) ORAL
Qty: 2 TABLET | Refills: 0 | Status: SHIPPED | OUTPATIENT
Start: 2021-08-24 | End: 2021-09-28

## 2021-08-24 NOTE — TELEPHONE ENCOUNTER
Pre assessment questions completed for upcoming colonoscopy procedure scheduled on 9/1/21 with wife Rose WALDROP test scheduled 8/29/21    Procedural arrival time and facility location reviewed.    Designated  policy reviewed.    Extended prep script sent to [facility] - [place], [place] - [address] [place] [address] pharmacy. Prep instructions sent via [employer].    Reviewed ext prep instructions with patient's wife.     Anticoagulation/blood thinners? no    Electronic implanted devices? no    Rose verbalized understanding and had no questions or concerns at this time.    Jyothi Holloway RN

## 2021-08-25 ENCOUNTER — MYC REFILL (OUTPATIENT)
Dept: PALLIATIVE MEDICINE | Facility: CLINIC | Age: 51
End: 2021-08-25

## 2021-08-25 ENCOUNTER — MYC MEDICAL ADVICE (OUTPATIENT)
Dept: PALLIATIVE MEDICINE | Facility: CLINIC | Age: 51
End: 2021-08-25

## 2021-08-25 DIAGNOSIS — G89.29 CHRONIC ABDOMINAL PAIN: ICD-10-CM

## 2021-08-25 DIAGNOSIS — G89.4 CHRONIC PAIN SYNDROME: ICD-10-CM

## 2021-08-25 DIAGNOSIS — R10.9 CHRONIC ABDOMINAL PAIN: ICD-10-CM

## 2021-08-25 NOTE — TELEPHONE ENCOUNTER
Routed to the nursing pool and to the MA pool to process refill(s).    Demetrice Yeh, RN-BSN  [facility]

## 2021-08-25 NOTE — TELEPHONE ENCOUNTER
Refills have been requested for the following medications:         oxyCODONE (ROXICODONE) 5 MG/5ML solution [Patti Milligan MD]         fentaNYL (DURAGESIC) 25 mcg/hr 72 hr patch [Patti Milligan MD]     Preferred pharmacy: [address]

## 2021-08-25 NOTE — TELEPHONE ENCOUNTER
Received call from patient requesting refill(s) of fentaNYL (DURAGESIC) 25 mcg/hr 72 hr patch and oxyCODONE (ROXICODONE) 5 MG/5ML solution     Last dispensed from pharmacy on 08/05/21 for both    Patient's last office/virtual visit by prescribing provider on 08/11/21  Next office/virtual appointment scheduled for : none    Last urine drug screen date 01/13/21  Current opioid agreement on file (completed within the last year) Yes Date of opioid agreement: 01/05/21    E-prescribe to pharmacy-[facility] - [place], [place] - [address]     Will route to nursing [place] for review and preparation of prescription(s).

## 2021-08-25 NOTE — PROGRESS NOTES
This is a recent snapshot of the patient's [facility] medical record.  For current drug dose and complete information and questions, call [phone] or In Basket pool, fv home infusion ([id number])  CSN Number:  [id number]

## 2021-08-25 NOTE — TELEPHONE ENCOUNTER
Patient requesting refill(s) of  oxyCODONE (ROXICODONE) 5 MG/5ML solution    Last dispensed from pharmacy on 8/5/2021    fentaNYL (DURAGESIC) 25 mcg/hr 72 hr patch   Last dispensed from pharmacy on 8/5/2021    Patient's last office/virtual visit by prescribing provider on 8/11/2021  Next office/virtual appointment scheduled for None     Last urine drug screen date 1/13/2021  Current opioid agreement on file (completed within the last year) Yes Date of opioid agreement: 1/5/2021    E-prescribe to [facility] [place] - [address]     Will route to nursing [place] for review and preparation of prescription(s).

## 2021-08-26 ENCOUNTER — TELEPHONE (OUTPATIENT)
Dept: PALLIATIVE MEDICINE | Facility: CLINIC | Age: 51
End: 2021-08-26

## 2021-08-26 DIAGNOSIS — R10.9 CHRONIC ABDOMINAL PAIN: ICD-10-CM

## 2021-08-26 DIAGNOSIS — G89.4 CHRONIC PAIN SYNDROME: ICD-10-CM

## 2021-08-26 DIAGNOSIS — G89.29 CHRONIC ABDOMINAL PAIN: ICD-10-CM

## 2021-08-26 LAB — STFR SERPL-MCNC: 4.5 MG/L

## 2021-08-26 RX ORDER — FENTANYL 25 UG/1
1 PATCH TRANSDERMAL
Qty: 15 PATCH | Refills: 0 | Status: SHIPPED | OUTPATIENT
Start: 2021-08-26 | End: 2021-08-26

## 2021-08-26 RX ORDER — FENTANYL 25 UG/1
1 PATCH TRANSDERMAL
Qty: 15 PATCH | Refills: 0 | Status: SHIPPED | OUTPATIENT
Start: 2021-08-26 | End: 2021-09-23

## 2021-08-26 RX ORDER — OXYCODONE HCL 5 MG/5 ML
5-10 SOLUTION, ORAL ORAL 3 TIMES DAILY PRN
Qty: 900 ML | Refills: 0 | Status: SHIPPED | OUTPATIENT
Start: 2021-08-26 | End: 2021-08-26

## 2021-08-26 RX ORDER — OXYCODONE HCL 5 MG/5 ML
5-10 SOLUTION, ORAL ORAL 3 TIMES DAILY PRN
Qty: 900 ML | Refills: 0 | Status: SHIPPED | OUTPATIENT
Start: 2021-08-26 | End: 2021-09-23

## 2021-08-26 NOTE — TELEPHONE ENCOUNTER
New scripts sent.  Thank you for information.      Signed Prescriptions:                        Disp   Refills    fentaNYL (DURAGESIC) 25 mcg/hr 72 hr patch 15 pat*0        Sig: Place 1 patch onto the skin every 48 hours remove old           patch.  May fill 9/3/21 and start 9/6/21  Authorizing Provider: BRANDYN JENKINS    oxyCODONE (ROXICODONE) 5 MG/5ML solution   900 mL 0        Sig: Take 5-10 mLs (5-10 mg) by mouth 3 times daily as           needed for pain Max of 30mg/day. Put at least 4           hours between doses. Fill 9/3/21 and start 9/6/2.           30 day supply.  Authorizing Provider: BRANDYN JENKINS MD  [facility]

## 2021-08-26 NOTE — TELEPHONE ENCOUNTER
Script Eprescribed to pharmacy  [place] Prescription Monitoring Program checked    Will send this to MA team to notify patient.    Signed Prescriptions:                        Disp   Refills    oxyCODONE (ROXICODONE) 5 MG/5ML solution   900 mL 0        Sig: Take 5-10 mLs (5-10 mg) by mouth 3 times daily as           needed for pain Max of 30mg/day. Put at least 4           hours between doses. Fill 9/4/21 and start 9/6/2.           30 day supply.  Authorizing Provider: BRANDYN JENKINS    fentaNYL (DURAGESIC) 25 mcg/hr 72 hr patch 15 pat*0        Sig: Place 1 patch onto the skin every 48 hours remove old           patch.  May fill 9/4/21 and start 9/6/21  Authorizing Provider: BRANDYN JENKINS MD  [facility]

## 2021-08-26 NOTE — TELEPHONE ENCOUNTER
Need 2 new hard copies sent for Oxycodone Solution and Fentanyl Patches dated Ok to fill on 09-03-21 and ok to start 09-06-21 on both and resend. We are closed on 09-04 and 09-06 and closed also.  Thanks!  Sharon Fair, Pharmacy [facility] [phone]

## 2021-08-26 NOTE — TELEPHONE ENCOUNTER
Medication refill information reviewed.     Due date for oxyCODONE (ROXICODONE) 5 MG/5ML solution is 9/6/21     Due date for fentaNYL (DURAGESIC) 25 mcg/hr 72 hr patch is 9/6/21     Prescriptions prepped for review.     Will route to provider.     Skinny Kim RN  Patient Care Supervisor   [facility] [place]

## 2021-08-27 NOTE — TELEPHONE ENCOUNTER
This was processed in a duplicate refill encounter.     YADIRA LazarN, RN  Care Coordinator  [facility] [place]

## 2021-08-27 NOTE — PROGRESS NOTES
This is a recent snapshot of the patient's [facility] medical record.  For current drug dose and complete information and questions, call [phone] or In Basket pool, fv home infusion ([id number])  CSN Number:  [id number]

## 2021-08-29 ENCOUNTER — LAB (OUTPATIENT)
Dept: LAB | Facility: CLINIC | Age: 51
End: 2021-08-29
Payer: COMMERCIAL

## 2021-08-29 DIAGNOSIS — Z11.59 ENCOUNTER FOR SCREENING FOR OTHER VIRAL DISEASES: ICD-10-CM

## 2021-08-30 ENCOUNTER — TELEPHONE (OUTPATIENT)
Dept: GASTROENTEROLOGY | Facility: CLINIC | Age: 51
End: 2021-08-30

## 2021-08-30 NOTE — TELEPHONE ENCOUNTER
Caller: Rose     Procedure: Colonoscopy     Date of Procedure Cancelled: 09/01/2021    Ordering Provider:Jimbo Estrada MD    Reason for cancel: wanting to postpone     Rescheduled: yes      If rescheduled:    Date: 10/6/2021   Location: [facility]   Note any change or update to original order/sedation:

## 2021-08-31 ENCOUNTER — HOME INFUSION (PRE-WILLOW HOME INFUSION) (OUTPATIENT)
Dept: PHARMACY | Facility: CLINIC | Age: 51
End: 2021-08-31

## 2021-09-01 ENCOUNTER — LAB REQUISITION (OUTPATIENT)
Dept: LAB | Facility: CLINIC | Age: 51
End: 2021-09-01
Payer: COMMERCIAL

## 2021-09-01 ENCOUNTER — HOME INFUSION (PRE-WILLOW HOME INFUSION) (OUTPATIENT)
Dept: PHARMACY | Facility: CLINIC | Age: 51
End: 2021-09-01

## 2021-09-01 LAB
BASOPHILS # BLD AUTO: 0.1 10E3/UL (ref 0–0.2)
BASOPHILS NFR BLD AUTO: 1 %
EOSINOPHIL # BLD AUTO: 0.1 10E3/UL (ref 0–0.7)
EOSINOPHIL NFR BLD AUTO: 1 %
ERYTHROCYTE [DISTWIDTH] IN BLOOD BY AUTOMATED COUNT: 14.6 % (ref 10–15)
HCT VFR BLD AUTO: 40.9 % (ref 40–53)
HGB BLD-MCNC: 13.2 G/DL (ref 13.3–17.7)
IMM GRANULOCYTES # BLD: 0 10E3/UL
IMM GRANULOCYTES NFR BLD: 0 %
LYMPHOCYTES # BLD AUTO: 2.4 10E3/UL (ref 0.8–5.3)
LYMPHOCYTES NFR BLD AUTO: 29 %
MCH RBC QN AUTO: 30.4 PG (ref 26.5–33)
MCHC RBC AUTO-ENTMCNC: 32.3 G/DL (ref 31.5–36.5)
MCV RBC AUTO: 94 FL (ref 78–100)
MONOCYTES # BLD AUTO: 1 10E3/UL (ref 0–1.3)
MONOCYTES NFR BLD AUTO: 12 %
NEUTROPHILS # BLD AUTO: 4.8 10E3/UL (ref 1.6–8.3)
NEUTROPHILS NFR BLD AUTO: 57 %
NRBC # BLD AUTO: 0 10E3/UL
NRBC BLD AUTO-RTO: 0 /100
PLATELET # BLD AUTO: 190 10E3/UL (ref 150–450)
RBC # BLD AUTO: 4.34 10E6/UL (ref 4.4–5.9)
WBC # BLD AUTO: 8.4 10E3/UL (ref 4–11)

## 2021-09-01 PROCEDURE — 87040 BLOOD CULTURE FOR BACTERIA: CPT | Performed by: INTERNAL MEDICINE

## 2021-09-01 PROCEDURE — 85025 COMPLETE CBC W/AUTO DIFF WBC: CPT | Performed by: INTERNAL MEDICINE

## 2021-09-02 ENCOUNTER — HOME INFUSION (PRE-WILLOW HOME INFUSION) (OUTPATIENT)
Dept: PHARMACY | Facility: CLINIC | Age: 51
End: 2021-09-02

## 2021-09-02 NOTE — PROGRESS NOTES
This is a recent snapshot of the patient's [facility] medical record.  For current drug dose and complete information and questions, call [phone] or In Basket pool, fv home infusion ([id number])  CSN Number:  [id number]

## 2021-09-03 ENCOUNTER — TELEPHONE (OUTPATIENT)
Dept: INFECTIOUS DISEASES | Facility: CLINIC | Age: 51
End: 2021-09-03

## 2021-09-03 NOTE — TELEPHONE ENCOUNTER
9/3/2021   Infectious Diseases Telephone Note:     Parker has been having ongoing fevers and body aches any time his line is accessed or manipulated, despite recent negative blood cultures. During his workup he has had one positive culture for CoNS and one for an unusual Pseudomonas species. All repeats done off antibiotics were negative. I do still suspect he has a line infection since symptoms are closely correlated with line use. Vancomycin did not really improve his symptoms so there's a chance that it is a Gram negative organism, perhaps the unusual Pseudomonas species that grew from a single blood culture, even though it has not grown again despite lack of treatment. We discussed a trial of a fluoroquinolone to see if his fevers improved. However, he does not really tolerate or absorb oral medications.  We had also tried to arrange for home antibiotic locks but his insurance wouldn't cover it. The only other option would be home IV cefepime, but I'm worried about continuing home therapies without really knowing the pathogen, and with the line still in place and worsening symptoms.     At this point since we don't know what we are treating, I think his line needs to be removed and the tip cultured. We can't achieve this as an outpatient due to hisTPN needs. He should have repeat blood cultures as well. It would be reasonable to start vancomycin and cefepime while awaiting culture results.     I did try to arrange for direct admission for him but the [facility] is full with 7 boarding in the ED and, upon discussion with hospitalists on the [facility], it's unclear whether we could meet his line removal/replacement needs there. I also discussed possibly having him go to [facility] since it is close to him, but Pakrer and Rose report that [place] [facility] has not been able to accommodate his line/TPN needs in the past. Therefore, I asked them to come to the [facility] ED when able to repeat blood cultures, start IV  cefepime and vancomycin, and arrange for central line removal with tip culture.     Марина Bucnker MD  Infectious Diseases

## 2021-09-06 DIAGNOSIS — Z11.59 ENCOUNTER FOR SCREENING FOR OTHER VIRAL DISEASES: ICD-10-CM

## 2021-09-06 LAB
BACTERIA BLD CULT: NO GROWTH

## 2021-09-07 ENCOUNTER — LAB REQUISITION (OUTPATIENT)
Dept: LAB | Facility: CLINIC | Age: 51
End: 2021-09-07
Payer: COMMERCIAL

## 2021-09-07 DIAGNOSIS — R13.10 DYSPHAGIA, UNSPECIFIED: ICD-10-CM

## 2021-09-07 DIAGNOSIS — R11.11 VOMITING WITHOUT NAUSEA: ICD-10-CM

## 2021-09-07 DIAGNOSIS — Z98.84 BARIATRIC SURGERY STATUS: ICD-10-CM

## 2021-09-07 LAB
ALBUMIN SERPL-MCNC: 3.4 G/DL (ref 3.4–5)
ALP SERPL-CCNC: 70 U/L (ref 40–150)
ALT SERPL W P-5'-P-CCNC: 38 U/L (ref 0–70)
ANION GAP SERPL CALCULATED.3IONS-SCNC: 6 MMOL/L (ref 3–14)
AST SERPL W P-5'-P-CCNC: 20 U/L (ref 0–45)
BASOPHILS # BLD AUTO: 0 10E3/UL (ref 0–0.2)
BASOPHILS NFR BLD AUTO: 1 %
BILIRUB SERPL-MCNC: 0.4 MG/DL (ref 0.2–1.3)
BUN SERPL-MCNC: 15 MG/DL (ref 7–30)
CALCIUM SERPL-MCNC: 8.3 MG/DL (ref 8.5–10.1)
CHLORIDE BLD-SCNC: 106 MMOL/L (ref 94–109)
CO2 SERPL-SCNC: 30 MMOL/L (ref 20–32)
CREAT SERPL-MCNC: 0.9 MG/DL (ref 0.66–1.25)
EOSINOPHIL # BLD AUTO: 0.3 10E3/UL (ref 0–0.7)
EOSINOPHIL NFR BLD AUTO: 4 %
ERYTHROCYTE [DISTWIDTH] IN BLOOD BY AUTOMATED COUNT: 14.6 % (ref 10–15)
GFR SERPL CREATININE-BSD FRML MDRD: >90 ML/MIN/1.73M2
GLUCOSE BLD-MCNC: 108 MG/DL (ref 70–99)
HCT VFR BLD AUTO: 33.9 % (ref 40–53)
HGB BLD-MCNC: 10.8 G/DL (ref 13.3–17.7)
IMM GRANULOCYTES # BLD: 0.1 10E3/UL
IMM GRANULOCYTES NFR BLD: 1 %
LYMPHOCYTES # BLD AUTO: 2 10E3/UL (ref 0.8–5.3)
LYMPHOCYTES NFR BLD AUTO: 27 %
MAGNESIUM SERPL-MCNC: 2.1 MG/DL (ref 1.6–2.3)
MCH RBC QN AUTO: 30.9 PG (ref 26.5–33)
MCHC RBC AUTO-ENTMCNC: 31.9 G/DL (ref 31.5–36.5)
MCV RBC AUTO: 97 FL (ref 78–100)
MONOCYTES # BLD AUTO: 1.2 10E3/UL (ref 0–1.3)
MONOCYTES NFR BLD AUTO: 17 %
NEUTROPHILS # BLD AUTO: 3.7 10E3/UL (ref 1.6–8.3)
NEUTROPHILS NFR BLD AUTO: 50 %
NRBC # BLD AUTO: 0 10E3/UL
NRBC BLD AUTO-RTO: 0 /100
PHOSPHATE SERPL-MCNC: 2.9 MG/DL (ref 2.5–4.5)
PLATELET # BLD AUTO: 150 10E3/UL (ref 150–450)
POTASSIUM BLD-SCNC: 3.6 MMOL/L (ref 3.4–5.3)
PROT SERPL-MCNC: 6.3 G/DL (ref 6.8–8.8)
RBC # BLD AUTO: 3.5 10E6/UL (ref 4.4–5.9)
SODIUM SERPL-SCNC: 142 MMOL/L (ref 133–144)
WBC # BLD AUTO: 7.4 10E3/UL (ref 4–11)

## 2021-09-07 PROCEDURE — 36592 COLLECT BLOOD FROM PICC: CPT | Performed by: SURGERY

## 2021-09-07 PROCEDURE — 83735 ASSAY OF MAGNESIUM: CPT | Performed by: SURGERY

## 2021-09-07 PROCEDURE — 84100 ASSAY OF PHOSPHORUS: CPT | Performed by: SURGERY

## 2021-09-07 PROCEDURE — 80053 COMPREHEN METABOLIC PANEL: CPT | Performed by: SURGERY

## 2021-09-07 PROCEDURE — 85025 COMPLETE CBC W/AUTO DIFF WBC: CPT | Performed by: SURGERY

## 2021-09-08 ENCOUNTER — HOME INFUSION (PRE-WILLOW HOME INFUSION) (OUTPATIENT)
Dept: PHARMACY | Facility: CLINIC | Age: 51
End: 2021-09-08

## 2021-09-08 NOTE — PROGRESS NOTES
This is a recent snapshot of the patient's [facility] medical record.  For current drug dose and complete information and questions, call [phone] or In Basket pool, fv home infusion ([id number])  CSN Number:  [id number]

## 2021-09-10 ENCOUNTER — HOME INFUSION (PRE-WILLOW HOME INFUSION) (OUTPATIENT)
Dept: PHARMACY | Facility: CLINIC | Age: 51
End: 2021-09-10

## 2021-09-10 NOTE — PROGRESS NOTES
This is a recent snapshot of the patient's [facility] medical record.  For current drug dose and complete information and questions, call [phone] or In [facility] pool, fv home infusion ([id number])  CSN Number:  [id number]

## 2021-09-10 NOTE — ED AVS SNAPSHOT
[facility], Emergency Department    [address] [address]    Phone:  [phone]                                       Parker Acevedo Sr   MRN: [id number]    Department:  [facility], Emergency Department   Date of Visit:  3/12/2017           Patient Information     Date Of Birth          1970        Your diagnoses for this visit were:     Pain at injection site, subsequent encounter        You were seen by Fifi Hicks MD.        Discharge Instructions       TODAY'S VISIT:  You were seen today for pain at your Port site.  - Your evaluation so far today has been grossly reassuring and reviewed.  Recent laboratory testing/blood cultures have also been reassuring.We do have new cultures pending from today that your regular provider can follow-up.     FOLLOW-UP:  Please make an appointment to follow up with Your Primary Care Provider as soon as possible.    PRESCRIPTIONS / MEDICATIONS:  - No new changes recommended.     OTHER INSTRUCTIONS:  - Good site cares.     RETURN TO THE EMERGENCY DEPARTMENT  Return to the Emergency Department at any time for new/worsening symptoms.       Discharge Instructions: Caring for Your Central Line  You are going home with a central line. It s also called a central venous access device (CVAD) or central venous catheter (CVC). A small, soft tube (catheter) has been put in a vein that leads to your heart. This provides medication during treatment, and is taken out when you no longer need it. At home, you need to take care of your central line to keep it working. And since a central line has a high infection risk, you must take extra care washing your hands and preventing the spread of germs. This sheet will help you remember what to do at home.    Understanding your role    A nurse or other health care provider will teach you and your caregivers how to care for the central line. Before leaving the hospital, make sure you understand what to do at  home, how long you may need the central line, and when to have a follow-up visit.    You will likely be told to flush the central line with saline or heparin solution. You may also be told to change the catheter s injection caps and change the dressing (bandage). Or, a nurse may do this for you during a follow-up visit. Only do these things if you re told to, following the instructions you were given.  Protecting the central line  If the central line gets damaged, it won t work right and could raise your chance of infection. Call your health care team right away if any damage occurs. To protect the central line at home:    Prevent infection. Use good hand hygiene by following the guidelines on this sheet. Don t touch the catheter or dressing unless you need to. And always clean your hands before and after you come in contact with any part of the central line. Your caregivers, family members, and any visitors should use good hand hygiene, too.    Keep the central line dry. The catheter and dressing must stay dry. Don t take baths, go swimming, use a hot tub, or do other activities that could get the central line wet. Take a sponge bath to avoid getting the central line wet, unless your healthcare provider tells you otherwise. Ask your provider about the best way to keep the line dry when bathing or showering. If the dressing does get wet, change it only if you have been shown how. Otherwise, call your health care team right away for help.    Avoid damage. Don t use any sharp or pointy objects around the catheter. This includes scissors, pins, knives, razors, or anything else that could puncture or cut it. Also, don t let anything pull or rub on the catheter, such as clothing.    Watch for signs of problems. Pay attention to how much of the catheter sticks out from your skin. If this changes at all, let your health care provider know. Also watch for cracks, leaks, or other damage. And if the dressing becomes dirty,  loose, or wet, change it (if you have been instructed to) or call your healthcare team right away.    Avoid lowering your chest below your waist. This includes bending at the waist for actions like tying your shoes. When your chest is positioned below your waist, especially for a long time, the catheter s internal tip could slip out of place in the vein.    Tell your health care team if you vomit or have severe coughing. This can also make the catheter slip out of place.  Risk of blood clot  If a blood clot forms it can block blood flow through the vein where the catheter is placed. Signs of a blood clot include pain or swelling in the neck, face, chest or arm. If you have any of these symptoms, call your healthcare provider right away. You may need an ultrasound exam to locate the blood clot and receive treatment with a blood thinner.    Prevent infection with good hand hygiene  A central line can let germs into your body. This can lead to serious and sometimes deadly infections. To prevent infection, it s very important that you, your caregivers, and others around you use good hand hygiene. This means washing your hands well with soap and water, and cleaning them with alcohol-based hand gel as directed. Never touch the central line or dressing without first using one of these methods.  To wash your hands with soap and water:  1. Wet your hands with warm water. (Avoid hot water, which can cause skin irritation when you wash your hands often.)  2. Apply enough soap to cover the entire surface of your hands, including your fingers.  3. Rub your hands together vigorously for at least 15 seconds. Make sure to rub the front and back of each hand up to the wrist, your fingers and fingernails, between the fingers, and each thumb.  4. Rinse your hands with warm water.  5. Dry your hands completely with a new, unused paper towel. Don t use a cloth towel or other reusable towel. These can harbor germs.  6. Use the paper  towel to turn off the faucet, then throw it away. If you re in a bathroom, also use a paper towel to open the door instead of touching the handle.  When you don t have access to soap and water: Use alcohol-based hand gel to clean your hands. The gel should have at least 60% alcohol. Follow the instructions on the package. Your healthcare team can answer any questions you have about when to use hand gel, or when it s better to wash with soap and water.   When to seek medical care  Call your provider right away if you have any of the following:    Pain or burning in your shoulder, chest, back, arm, or leg    Fever of 100.4 F (38.0 C) or higher    Chills    Signs of infection at the catheter site (pain, redness, drainage, burning, or stinging)    Coughing, wheezing, or shortness of breath    A racing or irregular heartbeat    Muscle stiffness or trouble moving    Gurgling noises coming from the catheter    The catheter falls out, breaks, cracks, leaks, or has other damage     0626-0605 [employer]. [address] [place] [address]. All rights reserved. This information is not intended as a substitute for professional medical care. Always follow your healthcare professional's instructions.            Future Appointments        Provider Department Dept Phone Center    3/30/2017 1:00 PM Darren Campos MD [facility] [phone] [facility]    4/5/2017 2:30 PM Patti Milligan MD [facility] [phone] [facility]    4/17/2017 11:00 AM Esteban Daly MD [facility] [phone] JOSIE TILLMAN      24 Hour Appointment Hotline       To make an appointment at any [facility], call [phone] ([phone]). If you don't have a family doctor or clinic, we will help you find one. [facility] are conveniently located to serve the needs of you and your family.             Review of your medicines      Our records show that you are taking the  "medicines listed below. If these are incorrect, please call your family doctor or clinic.        Dose / Directions Last dose taken    * amphetamine-dextroamphetamine 20 MG per tablet   Commonly known as:  ADDERALL   Dose:  20 mg   Quantity:  60 tablet        Take 1 tablet (20 mg) by mouth 2 times daily   Refills:  0        * amphetamine-dextroamphetamine 20 MG per tablet   Commonly known as:  ADDERALL   Dose:  20 mg   Quantity:  60 tablet        Take 1 tablet (20 mg) by mouth 2 times daily   Refills:  0        * amphetamine-dextroamphetamine 20 MG per tablet   Commonly known as:  ADDERALL   Dose:  20 mg   Quantity:  60 tablet        Take 1 tablet (20 mg) by mouth 2 times daily   Refills:  0        cyanocobalamin 1000 MCG/ML injection   Commonly known as:  VITAMIN B12   Dose:  1 mL   Quantity:  1 mL        Inject 1 mL (1,000 mcg) into the muscle every 30 days   Refills:  11        dextrose 1,000 mL with sodium chloride 76.92 mEq solution   Dose:  110 mL/hr   Quantity:  2640 mL        Inject 110 mL/hr into the vein continuous   Refills:  10        diazepam 5 MG tablet   Commonly known as:  VALIUM   Quantity:  60 tablet        5 mg each morning and 5 mg at bedtime   Refills:  2        * [facility] PATIENT        Contact [facility] for patient specific medication information at [phone] on admission and discharge from the hospital.  Phones are answered 24 hours a day 7 days a week 365 days a year.  Providers - Choose \"CONTINUE HOME MED (no script)\" at discharge if patient treatment with home infusion will continue.   Refills:  0        * [facility] PATIENT        Contact [facility] for patient specific medication information at [phone] on admission and discharge from the hospital.  Phones are answered 24 hours a day 7 days a week 365 days a year.  Providers - Choose \"CONTINUE HOME MED (no script)\" at discharge if patient treatment with home " "infusion will continue.   Refills:  0        fentaNYL 50 mcg/hr 72 hr patch   Commonly known as:  DURAGESIC   Dose:  1 patch   Quantity:  15 patch        Place 1 patch onto the skin every 48 hours MYLAN BRAND ONLY. Fill on/after 3/24/17 to start on/after 3/27/17   Refills:  0        lidocaine-prilocaine cream   Commonly known as:  EMLA   Quantity:  30 g        Apply topically as needed for moderate pain   Refills:  3        morphine 0.1% in intrasite topical gel   Quantity:  100 g        Apply as needed prior to accessing the port site.   Refills:  0        mupirocin 2 % ointment   Commonly known as:  BACTROBAN   Quantity:  30 g        Apply topically 3 times daily   Refills:  0        Needle (Disp) 27G X 1/2\" Misc   Commonly known as:  BD DISP NEEDLES   Dose:  1 Device   Quantity:  3 each        1 Device every 30 days Use for cyanocobalamin injection once q 30 days.   Refills:  4        nystatin-triamcinolone cream   Commonly known as:  MYCOLOG II   Quantity:  60 g        Apply topically 2 times daily as needed   Refills:  5        ondansetron 8 MG ODT tab   Commonly known as:  ZOFRAN-ODT   Dose:  8 mg   Quantity:  90 tablet        Take 1 tablet (8 mg) by mouth every 8 hours as needed for nausea   Refills:  3        * order for DME   Quantity:  12 each        Injection Supplies for Vitamin B12: 3cc syringes w/ 27 gauge needles, 1/2 inch length   Refills:  0        * order for DME   Quantity:  4 each        Equipment being ordered: Bilateral knee high chronic venous insufficiency stockings--  mild-moderate pressures.   Refills:  5        oxyCODONE 5 MG/5ML solution   Commonly known as:  ROXICODONE   Dose:  10-15 mg   Quantity:  1350 mL        Take 10-15 mLs (10-15 mg) by mouth every 4 hours as needed for moderate to severe pain Max of 45mg per day. Fill on/after 3/24/17 not to start till 3/26/17.   Refills:  0        polyethylene glycol powder   Commonly known as:  MIRALAX   Dose:  1 capful   Quantity:  510 g        " Take 17 g (1 capful) by mouth daily as needed   Refills:  11        senna-docusate 8.6-50 MG per tablet   Commonly known as:  SENOKOT-S;PERICOLACE   Dose:  1-2 tablet   Quantity:  120 tablet        Take 1-2 tablets by mouth 2 times daily as needed for constipation   Refills:  11        sucralfate 1 GM/10ML suspension   Commonly known as:  CARAFATE   Dose:  1 g   Quantity:  1200 mL        Take 10 mLs (1 g) by mouth 4 times daily   Refills:  11        vitamin D 58766 UNIT capsule   Commonly known as:  ERGOCALCIFEROL   Dose:  95421 Units   Quantity:  12 capsule        Take 1 capsule (50,000 Units) by mouth every 7 days   Refills:  3        * Notice:  This list has 7 medication(s) that are the same as other medications prescribed for you. Read the directions carefully, and ask your doctor or other care provider to review them with you.            Procedures and tests performed during your visit     Procedure/Test Number of Times Performed    Basic metabolic panel 1    Blood culture 2    CBC with platelets differential 1    CRP inflammation 1    Erythrocyte sedimentation rate auto 1    INR 1    ISTAT CG4 gases lactate ruben nursing POCT 1    ISTAT gases lactate ruben POCT 1    XR Chest 2 Views 1      Orders Needing Specimen Collection     None      Pending Results     Date and Time Order Name Status Description    3/12/2017 0304 XR Chest 2 Views In process     3/12/2017 0112 Blood culture In process     3/12/2017 0112 Erythrocyte sedimentation rate auto In process     3/12/2017 0112 CRP inflammation In process     3/12/2017 0112 Basic metabolic panel In process             Pending Culture Results     Date and Time Order Name Status Description    3/12/2017 0112 Blood culture In process             Thank you for choosing [facility]       Thank you for choosing [facility] for your care. Our goal is always to provide you with excellent care. Hearing back from our patients is one way we can continue to improve our services. Please  take a few minutes to complete the written survey that you may receive in the mail after you visit with us. Thank you!        [employer] Information     [employer] gives you secure access to your electronic health record. If you see a primary care provider, you can also send messages to your care team and make appointments. If you have questions, please call your primary care clinic.  If you do not have a primary care provider, please call [phone] and they will assist you.        Care EveryWhere ID     This is your Care EveryWhere ID. This could be used by other organizations to access your [facility] medical records  [id number]        After Visit Summary       This is your record. Keep this with you and show to your community pharmacist(s) and doctor(s) at your next visit.                   Secondary Intention Text (Leave Blank If You Do Not Want): The defect will heal with secondary intention.

## 2021-09-13 ENCOUNTER — MYC REFILL (OUTPATIENT)
Dept: INTERNAL MEDICINE | Facility: CLINIC | Age: 51
End: 2021-09-13

## 2021-09-13 DIAGNOSIS — F90.0 ADHD (ATTENTION DEFICIT HYPERACTIVITY DISORDER), INATTENTIVE TYPE: ICD-10-CM

## 2021-09-13 NOTE — PROGRESS NOTES
This is a recent snapshot of the patient's [facility] medical record.  For current drug dose and complete information and questions, call [phone] or In Basket pool, fv home infusion ([id number])  CSN Number:  [id number]

## 2021-09-14 ENCOUNTER — PATIENT OUTREACH (OUTPATIENT)
Dept: FAMILY MEDICINE | Facility: CLINIC | Age: 51
End: 2021-09-14
Payer: COMMERCIAL

## 2021-09-14 RX ORDER — DEXTROAMPHETAMINE SACCHARATE, AMPHETAMINE ASPARTATE, DEXTROAMPHETAMINE SULFATE AND AMPHETAMINE SULFATE 5; 5; 5; 5 MG/1; MG/1; MG/1; MG/1
TABLET ORAL
Qty: 30 TABLET | Refills: 0 | Status: SHIPPED | OUTPATIENT
Start: 2021-09-14 | End: 2021-10-12

## 2021-09-14 RX ORDER — LORAZEPAM 1 MG/1
TABLET ORAL
Qty: 30 TABLET | Refills: 0 | Status: SHIPPED | OUTPATIENT
Start: 2021-09-14 | End: 2021-10-12

## 2021-09-14 NOTE — LETTER
[facility] C[facility] COORDINATION  [address]   [address]  Tel. [phone] / Fax [phone]  September 14, 2021        Parker Acevedo  [address] [place] [address]          Dear Parker,     Miguel is an updated Patient Centered Plan of Care for your continued enrollment in Care Coordination. Please let us know if you have additional questions, concerns, or goals that we can assist with.    Sincerely,    Dianelys MAYN, RN, PHN, [facility]  Primary Clinic Care Coordination    [facility]  Primary Care Clinics  [email or web address]   Office: [phone]  Employed by [facility]  Patient Centered Plan of Care  About Me:        Patient Name:  Parker Acevedo    YOB: 1970  Age:         50 year old   [facility] MRN:    [id number] Telephone Information:  Home Phone [phone]   Mobile Not on file.       Address:  [address] [place] [address] [place] [address] Email address:  [email or web address]      Emergency Contact(s)    Name Relationship Lgl Grd Work Phone Home Phone Mobile Phone   1. RACHEL, BERTRAND* Spouse No  [phone]    2. JEYSON CAIN * Relative No  [phone]   3. CHARLI ACEVEDO* Mother No  [phone]           Primary language:  English     needed? No   [employer]:  [phone] op. 1  Other communication barriers: Glasses, Physical impairment  Preferred Method of Communication:  MyChart  Current living arrangement:    Mobility Status/ Medical Equipment:      Health Maintenance  Health Maintenance Reviewed: Due/Overdue     My Access Plan  Medical Emergency 911   Primary Clinic Line [facility] - [phone]   24 Hour Appointment Line [phone] or  [phone]) (toll-free)   24 Hour Nurse Line [phone] (toll-free)   Preferred  Urgent Care     Preferred Hospital     Pref[place] [facility] [place] [address] [facility]     Behavioral Health Crisis Line The National Suicide Prevention Lifeline at 1-681.386.8007 or 911     My Care Team Members  Patient Care Team       Relationship Specialty Notifications Start End    Chuy Isaac MD PCP - General Internal Medicine  10/30/18     Phone: [phone] Fax: [phone] [address] [facility] [address]    Patti Milligan MD MD Pain Clinic  6/2/15     Phone: [phone] Fax: [phone]         [address] [place] [address] [facility] [address]    Chuy Isaac MD Assigned PCP   11/11/18     Phone: [phone] Fax: [phone]         [address] [facility] [address]    Marlyn Jenkins MD MD Ophthalmology  1/29/19     Phone: [phone] Fax: [phone]         [address] [facility] [address] [facility] [address]    Marlyn Jenkins MD MD Ophthalmology  2/11/19     Phone: [phone] Fax: [phone]         [address] [facility] [address] [facility] [address]    Марина Buckner MD Assigned Infectious Disease Provider   10/23/20     Phone: [phone] Fax: [phone]         [address] [facility] [address] [facility] [address]    Francis Vyas MD Assigned Surgical Provider   10/23/20     Phone: [phone] Fax: [phone]         [address] [facility] [address] [facility] [address]    Stormy Rankin RN Lead Care Coordinator Primary Care - CC Admissions 12/1/20     Phone: [phone]                 My Care Plans  Self Management and Treatment Plan  Goals        General     #2 Medical (pt-stated)      Goal Statement: I want to prevent hospital visits  Date Goal set: 9/9/2020   Barriers: on chronic TPN, IV line  Strengths: home infusion, care coordination  Date to Achieve By: 12/9/21  Patient expressed understanding of goal: yes  Action steps to achieve this goal:  1. I will complete IV antibiotics as prescribed.  (completed)  2. I will follow up with infectious disease provider as scheduled 9/24/20. (completed)  3. I will stay at home during COVID-19 pandemic.   4. I will monitor my temperature daily as instructed and contact [facility] for elevated temperature parameters.  5. I will have G-tube replaced as scheduled. (completed)             Action Plans on File:      Advance Care Plans/Directives Type:        My Medical and Care Information  Problem List   Patient Active Problem List   Diagnosis     Peptic ulcer disease     Gastric bypass status for obesity     Chronic abdominal pain     Vomiting     Anemia     ADHD (attention deficit hyperactivity disorder), inattentive type     Vitamin B12 deficiency without anemia     Thiamine deficiency     Dehydration     Dysphagia     Weight loss, non-intentional     Malnutrition (H)     Chronic anxiety     Constipation     Bile reflux esophagitis     Former smoker     Vitamin D deficiency     Iron deficiency     Health Care Home     Chronic pain     Insomnia     Positive blood culture     Fungemia     Chronic nausea     Gastrostomy tube in place (H)     Short gut syndrome     Anxiety     Status post cervical spinal arthrodesis     Port or reservoir infection, initial encounter     Abnormal echocardiogram     Low serum iron     Anemia, iron deficiency     Catheter-related bloodstream infection (CRBSI)     Generalized weakness     S/P bariatric surgery     Hyperlipidemia LDL goal <130     Bacteremia     Infection by Candida species     PICC line infiltration, sequela     Gram-positive bacteremia     On total parenteral nutrition     Gastric outlet obstruction     Short bowel syndrome     Bloodstream infection associated with central venous catheter, initial encounter     Fever, unknown origin      Current Medications and Allergies:  See printed Medication Report.    Care Coordination Start Date: 1/10/2019   Frequency of Care Coordination: monthly   Form Last Updated:  09/14/2021

## 2021-09-14 NOTE — PROGRESS NOTES
Clinic Care Coordination Contact    Follow Up Progress Note      Assessment: Called and spoke with wife Rose, states things are kind of on a wait-and-see basis right now.  States patient has been running intermittent fevers and unsure why.  Blood culture came back negative the doctor is wondering if the tip of the catheter is infected but unsure if they want to pull it to culture.  Patient's temperature has not gone above 100.9, wife was instructed to bring him to the ED if his temperature is 101 or above.  Wife states he has not had any fevers for the last couple days and hoping this will be the trend.  Patient is scheduled for a Jim catheter in October hoping things go well so he can have this placed.    Care Gaps:    Health Maintenance Due   Topic Date Due     MEDICARE ANNUAL WELLNESS VISIT  06/21/2017     INFLUENZA VACCINE (1) 09/01/2021       Goals addressed this encounter:   Goals        #2 Medical (pt-stated)       Goal Statement: I want to prevent hospital visits  Date Goal set: 9/9/2020   Barriers: on chronic TPN, IV line  Strengths: home infusion, care coordination  Date to Achieve By: 12/9/21  Patient expressed understanding of goal: yes  Action steps to achieve this goal:  1. I will complete IV antibiotics as prescribed  2. I will follow up with infectious disease provider as scheduled 9/24/20. (completed)  3. I will stay at home during COVID-19 pandemic.   4. I will monitor my temperature daily as instructed and contact [facility] for elevated temperature parameters.  5. I will have Arya tube placed          Intervention/Education provided during outreach: Patient and wife are continue to monitor for fevers.     Outreach Frequency: monthly    Plan:   Care Coordinator will follow up in 1 month      Dianelys KHAN, RN, PHN, [facility]  Primary Clinic Care Coordination    [facility]  Primary Care Clinics  [email or web address]   Office: [phone]  Employed by  [facility] [facility]

## 2021-09-21 ENCOUNTER — LAB REQUISITION (OUTPATIENT)
Dept: LAB | Facility: CLINIC | Age: 51
End: 2021-09-21
Payer: COMMERCIAL

## 2021-09-21 ENCOUNTER — HOME INFUSION (PRE-WILLOW HOME INFUSION) (OUTPATIENT)
Dept: PHARMACY | Facility: CLINIC | Age: 51
End: 2021-09-21

## 2021-09-21 DIAGNOSIS — R13.10 DYSPHAGIA, UNSPECIFIED: ICD-10-CM

## 2021-09-21 LAB
ALBUMIN SERPL-MCNC: 3.7 G/DL (ref 3.4–5)
ALP SERPL-CCNC: 74 U/L (ref 40–150)
ALT SERPL W P-5'-P-CCNC: 32 U/L (ref 0–70)
ANION GAP SERPL CALCULATED.3IONS-SCNC: 8 MMOL/L (ref 3–14)
AST SERPL W P-5'-P-CCNC: 16 U/L (ref 0–45)
BASOPHILS # BLD AUTO: 0 10E3/UL (ref 0–0.2)
BASOPHILS NFR BLD AUTO: 0 %
BILIRUB SERPL-MCNC: 0.6 MG/DL (ref 0.2–1.3)
BUN SERPL-MCNC: 25 MG/DL (ref 7–30)
CALCIUM SERPL-MCNC: 8.7 MG/DL (ref 8.5–10.1)
CHLORIDE BLD-SCNC: 108 MMOL/L (ref 94–109)
CO2 SERPL-SCNC: 25 MMOL/L (ref 20–32)
CREAT SERPL-MCNC: 1.26 MG/DL (ref 0.66–1.25)
EOSINOPHIL # BLD AUTO: 0.1 10E3/UL (ref 0–0.7)
EOSINOPHIL NFR BLD AUTO: 1 %
ERYTHROCYTE [DISTWIDTH] IN BLOOD BY AUTOMATED COUNT: 14.4 % (ref 10–15)
GFR SERPL CREATININE-BSD FRML MDRD: 67 ML/MIN/1.73M2
GLUCOSE BLD-MCNC: 103 MG/DL (ref 70–99)
HCT VFR BLD AUTO: 37.9 % (ref 40–53)
HGB BLD-MCNC: 12.6 G/DL (ref 13.3–17.7)
IMM GRANULOCYTES # BLD: 0 10E3/UL
IMM GRANULOCYTES NFR BLD: 0 %
LYMPHOCYTES # BLD AUTO: 2.4 10E3/UL (ref 0.8–5.3)
LYMPHOCYTES NFR BLD AUTO: 25 %
MAGNESIUM SERPL-MCNC: 1.9 MG/DL (ref 1.6–2.3)
MCH RBC QN AUTO: 30.6 PG (ref 26.5–33)
MCHC RBC AUTO-ENTMCNC: 33.2 G/DL (ref 31.5–36.5)
MCV RBC AUTO: 92 FL (ref 78–100)
MONOCYTES # BLD AUTO: 1.2 10E3/UL (ref 0–1.3)
MONOCYTES NFR BLD AUTO: 12 %
NEUTROPHILS # BLD AUTO: 5.6 10E3/UL (ref 1.6–8.3)
NEUTROPHILS NFR BLD AUTO: 62 %
NRBC # BLD AUTO: 0 10E3/UL
NRBC BLD AUTO-RTO: 0 /100
PHOSPHATE SERPL-MCNC: 4.5 MG/DL (ref 2.5–4.5)
PLATELET # BLD AUTO: 208 10E3/UL (ref 150–450)
POTASSIUM BLD-SCNC: 3.4 MMOL/L (ref 3.4–5.3)
PROT SERPL-MCNC: 7 G/DL (ref 6.8–8.8)
RBC # BLD AUTO: 4.12 10E6/UL (ref 4.4–5.9)
SODIUM SERPL-SCNC: 141 MMOL/L (ref 133–144)
WBC # BLD AUTO: 9.3 10E3/UL (ref 4–11)

## 2021-09-21 PROCEDURE — 36592 COLLECT BLOOD FROM PICC: CPT | Performed by: SURGERY

## 2021-09-21 PROCEDURE — 80053 COMPREHEN METABOLIC PANEL: CPT | Performed by: SURGERY

## 2021-09-21 PROCEDURE — 84100 ASSAY OF PHOSPHORUS: CPT | Performed by: SURGERY

## 2021-09-21 PROCEDURE — 85025 COMPLETE CBC W/AUTO DIFF WBC: CPT | Mod: GZ | Performed by: SURGERY

## 2021-09-21 PROCEDURE — 83735 ASSAY OF MAGNESIUM: CPT | Performed by: SURGERY

## 2021-09-21 NOTE — PROGRESS NOTES
This is a recent snapshot of the patient's [facility] medical record.  For current drug dose and complete information and questions, call [phone] or In Basket pool, fv home infusion ([id number])  CSN Number:  [id number]

## 2021-09-23 ENCOUNTER — MYC REFILL (OUTPATIENT)
Dept: PALLIATIVE MEDICINE | Facility: CLINIC | Age: 51
End: 2021-09-23

## 2021-09-23 DIAGNOSIS — G89.4 CHRONIC PAIN SYNDROME: ICD-10-CM

## 2021-09-23 DIAGNOSIS — R10.9 CHRONIC ABDOMINAL PAIN: ICD-10-CM

## 2021-09-23 DIAGNOSIS — G89.29 CHRONIC ABDOMINAL PAIN: ICD-10-CM

## 2021-09-23 NOTE — PROGRESS NOTES
This is a recent snapshot of the patient's [facility] medical record.  For current drug dose and complete information and questions, call [phone] or In [facility] pool, fv home infusion ([id number])  CSN Number:  [id number]

## 2021-09-24 RX ORDER — FENTANYL 25 UG/1
1 PATCH TRANSDERMAL
Qty: 15 PATCH | Refills: 0 | Status: SHIPPED | OUTPATIENT
Start: 2021-09-24 | End: 2021-10-15

## 2021-09-24 RX ORDER — OXYCODONE HCL 5 MG/5 ML
5-10 SOLUTION, ORAL ORAL 3 TIMES DAILY PRN
Qty: 900 ML | Refills: 0 | Status: SHIPPED | OUTPATIENT
Start: 2021-09-24 | End: 2021-10-15

## 2021-09-24 NOTE — TELEPHONE ENCOUNTER
Received call from patient requesting refill(s) of fentaNYL (DURAGESIC) 25 mcg/hr 72 hr patch   oxyCODONE (ROXICODONE) 5 MG/5ML solution    Last dispensed from pharmacy on both on  9/3/21    Patient's last office/virtual visit by prescribing provider on 8/11/21  Next office/virtual appointment scheduled for none    Last urine drug screen date 1/13/21  Current opioid agreement on file (completed within the last year) Yes Date of opioid agreement: 2/23/21    [place] [facility] [place] [facility]    Will route to nursing [place] for review and preparation of prescription(s).

## 2021-09-24 NOTE — TELEPHONE ENCOUNTER
Medication refill information reviewed.     Due date for fentaNYL (DURAGESIC) 25 mcg/hr 72 hr patch  and oxyCODONE (ROXICODONE) 5 MG/5ML solution is 10/06/21     Prescriptions prepped for review.     Will route to provider.

## 2021-09-24 NOTE — TELEPHONE ENCOUNTER
LVM for patient that prescriptions were sent to pharmacy. Confirmed pharmacy.      Gave fill date 10/04/21    Gave start date 10/06/21 on both medications.    Shaina Lam [facility]

## 2021-09-24 NOTE — TELEPHONE ENCOUNTER
Script Eprescribed to pharmacy  [place] Prescription Monitoring Program checked    Will send this to MA team to notify patient.    Signed Prescriptions:                        Disp   Refills    fentaNYL (DURAGESIC) 25 mcg/hr 72 hr patch 15 pat*0        Sig: Place 1 patch onto the skin every 48 hours remove old           patch.  May fill 10/04/21 and start 10/06/21  Authorizing Provider: BRANDYN JENKINS    oxyCODONE (ROXICODONE) 5 MG/5ML solution   900 mL 0        Sig: Take 5-10 mLs (5-10 mg) by mouth 3 times daily as           needed for pain Max of 30mg/day. Put at least 4           hours between doses. Fill 10/04/21 and start           10/06/21. 30 day supply.  Authorizing Provider: BRANDYN JENKINS MD  [facility]

## 2021-09-24 NOTE — TELEPHONE ENCOUNTER
Refills have been requested for the following medications:         fentaNYL (DURAGESIC) 25 mcg/hr 72 hr patch [Patti Milligan MD]         oxyCODONE (ROXICODONE) 5 MG/5ML solution [Patti Milligan MD]     Preferred pharmacy: [address]

## 2021-09-26 ENCOUNTER — HEALTH MAINTENANCE LETTER (OUTPATIENT)
Age: 51
End: 2021-09-26

## 2021-09-28 ENCOUNTER — TELEPHONE (OUTPATIENT)
Dept: GASTROENTEROLOGY | Facility: CLINIC | Age: 51
End: 2021-09-28

## 2021-09-28 DIAGNOSIS — Z12.11 SPECIAL SCREENING FOR MALIGNANT NEOPLASMS, COLON: ICD-10-CM

## 2021-09-28 RX ORDER — BISACODYL 5 MG
TABLET, DELAYED RELEASE (ENTERIC COATED) ORAL
Qty: 2 TABLET | Refills: 0 | Status: SHIPPED | OUTPATIENT
Start: 2021-09-28 | End: 2021-11-24

## 2021-09-28 NOTE — TELEPHONE ENCOUNTER
Attempted to contact patient regarding upcoming colonoscopy procedure on 10/6/21 for pre assessment questions. No answer.     Left message to return call to [phone] #2    Covid test scheduled: 10/2/21    Arrival time: 0735    Facility location: [facility]    Sedation type: MAC    Bowel prep recommendation: Extended prep. Prep sent to [facility] - [place], [place] - [address] [facility]. Prep instructions sent via [employer].     Jyothi Holloway RN

## 2021-09-29 ENCOUNTER — TELEPHONE (OUTPATIENT)
Dept: INTERNAL MEDICINE | Facility: CLINIC | Age: 51
End: 2021-09-29

## 2021-09-29 ENCOUNTER — HOME INFUSION (PRE-WILLOW HOME INFUSION) (OUTPATIENT)
Dept: PHARMACY | Facility: CLINIC | Age: 51
End: 2021-09-29

## 2021-09-29 NOTE — TELEPHONE ENCOUNTER
Reason for Call:  Form, our goal is to have forms completed with 72 hours, however, some forms may require a visit or additional information.    Type of letter, form or note:  Home Health Certification    Who is the form from?: Home care    Where did the form come from: form was faxed in    What clinic location was the form placed at?: [facility]     Where the form was placed: Given to MA/CHRISTY Givens    What number is listed as a contact on the form?: [phone]       Additional comments:

## 2021-09-30 NOTE — TELEPHONE ENCOUNTER
Second attempt for pre-assessment prior to upcoming colonoscopy.    No answer.  Left message to return call [phone] #2    Alicia Benton RN

## 2021-10-01 RX ORDER — ASCORBIC ACID, VITAMIN A PALMITATE, CHOLECALCIFEROL, THIAMINE HYDROCHLORIDE, RIBOFLAVIN 5-PHOSPHATE SODIUM, PYRIDOXINE HYDROCHLORIDE, NIACINAMIDE, DEXPANTHENOL, ALPHA-TOCOPHEROL ACETATE, VITAMIN K1, FOLIC ACID, BIOTIN, CYANOCOBALAMIN 80; 2300; 400; 1.2; 1.4; 1; 17; 5; 7; .2; 140; 20; 1 MG/5ML; [IU]/5ML; [IU]/5ML; MG/5ML; MG/5ML; MG/5ML; MG/5ML; MG/5ML; [IU]/5ML; MG/5ML; UG/5ML; UG/5ML; UG/5ML
10 INJECTION, SOLUTION INTRAVENOUS
COMMUNITY
End: 2021-11-23

## 2021-10-02 ENCOUNTER — LAB (OUTPATIENT)
Dept: LAB | Facility: CLINIC | Age: 51
End: 2021-10-02
Payer: COMMERCIAL

## 2021-10-02 DIAGNOSIS — Z11.59 ENCOUNTER FOR SCREENING FOR OTHER VIRAL DISEASES: ICD-10-CM

## 2021-10-04 ENCOUNTER — TELEPHONE (OUTPATIENT)
Dept: GASTROENTEROLOGY | Facility: CLINIC | Age: 51
End: 2021-10-04

## 2021-10-04 ENCOUNTER — EXTERNAL ORDER RESULTS (OUTPATIENT)
Dept: INTERNAL MEDICINE | Facility: CLINIC | Age: 51
End: 2021-10-04

## 2021-10-04 DIAGNOSIS — Z53.9 DIAGNOSIS NOT YET DEFINED: Primary | ICD-10-CM

## 2021-10-04 NOTE — TELEPHONE ENCOUNTER
Caller: Wife/ Rose     Procedure: Colonoscopy    Date of Procedure Cancelled: 10/06/2021    Ordering Provider:Jibmo Estrada MD    Reason for cancel: change date/missed covid test     Any Staff messages sent regarding case?:                   Recipient and Sender:  & *                  Message Details:       Rescheduled: No -- awaiting MAC time in DEC/2021      If rescheduled:    Date:    Location:    Note any change or update to original order/sedation:

## 2021-10-04 NOTE — TELEPHONE ENCOUNTER
"Third attempt.     Attempted to contact patient regarding upcoming colonoscopy procedure on 10/6/21 for pre assessment questions. No answer.     Left message to return call to [phone] #2    Covid test scheduled: 10/2/21 - states \"need to be collected\" however it appears that patient kept appointment.     Will send mychart message to return call.    Jyothi Holloway RN      "

## 2021-10-05 ENCOUNTER — LAB REQUISITION (OUTPATIENT)
Dept: LAB | Facility: CLINIC | Age: 51
End: 2021-10-05
Payer: COMMERCIAL

## 2021-10-05 DIAGNOSIS — R13.10 DYSPHAGIA, UNSPECIFIED: ICD-10-CM

## 2021-10-05 LAB
ALBUMIN SERPL-MCNC: 3.6 G/DL (ref 3.4–5)
ALP SERPL-CCNC: 77 U/L (ref 40–150)
ALT SERPL W P-5'-P-CCNC: 29 U/L (ref 0–70)
ANION GAP SERPL CALCULATED.3IONS-SCNC: 6 MMOL/L (ref 3–14)
AST SERPL W P-5'-P-CCNC: 19 U/L (ref 0–45)
BASOPHILS # BLD AUTO: 0.1 10E3/UL (ref 0–0.2)
BASOPHILS NFR BLD AUTO: 0 %
BILIRUB DIRECT SERPL-MCNC: 0.2 MG/DL (ref 0–0.2)
BILIRUB SERPL-MCNC: 1.2 MG/DL (ref 0.2–1.3)
BUN SERPL-MCNC: 11 MG/DL (ref 7–30)
CALCIUM SERPL-MCNC: 8 MG/DL (ref 8.5–10.1)
CHLORIDE BLD-SCNC: 105 MMOL/L (ref 94–109)
CO2 SERPL-SCNC: 28 MMOL/L (ref 20–32)
CREAT SERPL-MCNC: 0.78 MG/DL (ref 0.66–1.25)
EOSINOPHIL # BLD AUTO: 0.1 10E3/UL (ref 0–0.7)
EOSINOPHIL NFR BLD AUTO: 1 %
ERYTHROCYTE [DISTWIDTH] IN BLOOD BY AUTOMATED COUNT: 14.6 % (ref 10–15)
FASTING STATUS PATIENT QL REPORTED: NORMAL
GFR SERPL CREATININE-BSD FRML MDRD: >90 ML/MIN/1.73M2
GLUCOSE BLD-MCNC: 93 MG/DL (ref 70–99)
HCT VFR BLD AUTO: 38.7 % (ref 40–53)
HGB BLD-MCNC: 12.6 G/DL (ref 13.3–17.7)
IMM GRANULOCYTES # BLD: 0 10E3/UL
IMM GRANULOCYTES NFR BLD: 0 %
LYMPHOCYTES # BLD AUTO: 1.5 10E3/UL (ref 0.8–5.3)
LYMPHOCYTES NFR BLD AUTO: 10 %
MAGNESIUM SERPL-MCNC: 2 MG/DL (ref 1.6–2.3)
MCH RBC QN AUTO: 30.6 PG (ref 26.5–33)
MCHC RBC AUTO-ENTMCNC: 32.6 G/DL (ref 31.5–36.5)
MCV RBC AUTO: 94 FL (ref 78–100)
MONOCYTES # BLD AUTO: 1.4 10E3/UL (ref 0–1.3)
MONOCYTES NFR BLD AUTO: 9 %
NEUTROPHILS # BLD AUTO: 11.4 10E3/UL (ref 1.6–8.3)
NEUTROPHILS NFR BLD AUTO: 80 %
NRBC # BLD AUTO: 0 10E3/UL
NRBC BLD AUTO-RTO: 0 /100
PHOSPHATE SERPL-MCNC: 3.5 MG/DL (ref 2.5–4.5)
PLATELET # BLD AUTO: 160 10E3/UL (ref 150–450)
POTASSIUM BLD-SCNC: 3.6 MMOL/L (ref 3.4–5.3)
PROT SERPL-MCNC: 6.8 G/DL (ref 6.8–8.8)
RBC # BLD AUTO: 4.12 10E6/UL (ref 4.4–5.9)
SODIUM SERPL-SCNC: 139 MMOL/L (ref 133–144)
TRIGL SERPL-MCNC: 126 MG/DL
WBC # BLD AUTO: 14.5 10E3/UL (ref 4–11)

## 2021-10-05 PROCEDURE — 84100 ASSAY OF PHOSPHORUS: CPT | Performed by: SURGERY

## 2021-10-05 PROCEDURE — 84478 ASSAY OF TRIGLYCERIDES: CPT | Mod: GZ | Performed by: SURGERY

## 2021-10-05 PROCEDURE — 83735 ASSAY OF MAGNESIUM: CPT | Performed by: SURGERY

## 2021-10-05 PROCEDURE — 80053 COMPREHEN METABOLIC PANEL: CPT | Performed by: SURGERY

## 2021-10-05 PROCEDURE — 36591 DRAW BLOOD OFF VENOUS DEVICE: CPT | Performed by: SURGERY

## 2021-10-05 PROCEDURE — 82248 BILIRUBIN DIRECT: CPT | Performed by: SURGERY

## 2021-10-05 PROCEDURE — 85025 COMPLETE CBC W/AUTO DIFF WBC: CPT | Mod: GZ | Performed by: SURGERY

## 2021-10-06 ENCOUNTER — HOSPITAL ENCOUNTER (EMERGENCY)
Facility: CLINIC | Age: 51
Discharge: HOME | End: 2021-10-07
Attending: EMERGENCY MEDICINE | Admitting: EMERGENCY MEDICINE
Payer: COMMERCIAL

## 2021-10-06 DIAGNOSIS — K94.23 GASTROSTOMY TUBE DYSFUNCTION (H): ICD-10-CM

## 2021-10-06 PROCEDURE — 43999 UNLISTED PROCEDURE STOMACH: CPT | Performed by: EMERGENCY MEDICINE

## 2021-10-06 PROCEDURE — 99282 EMERGENCY DEPT VISIT SF MDM: CPT | Mod: 25 | Performed by: EMERGENCY MEDICINE

## 2021-10-06 PROCEDURE — 99283 EMERGENCY DEPT VISIT LOW MDM: CPT | Mod: 25 | Performed by: EMERGENCY MEDICINE

## 2021-10-06 ASSESSMENT — ENCOUNTER SYMPTOMS
DIFFICULTY URINATING: 0
COUGH: 0
SORE THROAT: 0
DYSURIA: 0
CONFUSION: 0
CHEST TIGHTNESS: 0
NECK PAIN: 0
BACK PAIN: 0
ARTHRALGIAS: 0
CHILLS: 0
CONSTIPATION: 0
MYALGIAS: 0
FEVER: 0
ABDOMINAL DISTENTION: 0
PALPITATIONS: 0
DIARRHEA: 0
COLOR CHANGE: 0
FATIGUE: 0
FREQUENCY: 0
DIZZINESS: 0
HEADACHES: 0
VOMITING: 1
NAUSEA: 1
ABDOMINAL PAIN: 0
EYE PAIN: 0
SHORTNESS OF BREATH: 0
WEAKNESS: 0

## 2021-10-07 ENCOUNTER — HOSPITAL ENCOUNTER (EMERGENCY)
Facility: CLINIC | Age: 51
Discharge: LEFT BEFORE DISCHARGE | End: 2021-10-07
Payer: COMMERCIAL

## 2021-10-07 ENCOUNTER — HOME INFUSION (PRE-WILLOW HOME INFUSION) (OUTPATIENT)
Dept: PHARMACY | Facility: CLINIC | Age: 51
End: 2021-10-07

## 2021-10-07 VITALS
TEMPERATURE: 97 F | SYSTOLIC BLOOD PRESSURE: 130 MMHG | HEART RATE: 81 BPM | OXYGEN SATURATION: 99 % | RESPIRATION RATE: 18 BRPM | DIASTOLIC BLOOD PRESSURE: 77 MMHG

## 2021-10-07 NOTE — DISCHARGE INSTRUCTIONS
We were unable to replace your G-tube.  I have placed a smaller catheter to help keep the entrance hole open.  I recommended that you stay in the hospital to be evaluated by the GI team in the morning.  You have elected to leave AGAINST MEDICAL ADVICE.  It is extremely important that you call your gastroenterologist first thing tomorrow morning to be seen as soon as possible.  It is possible that you may need to undergo an additional procedure if your tube does not get replaced soon.  Return to the ED if you change your mind about admission or with any new or worsening medical symptoms.

## 2021-10-07 NOTE — ED PROVIDER NOTES
ED Provider Note  [facility]      History     Chief Complaint   Patient presents with     Post-op Problem     The history is provided by the patient and medical records.     Parker Acevedo is a 50 year old male with history of Celestino-en-Y gastric bypass complicated by severe malnutrition on TPN and G-tube who presents with displaced G tube.  He states that his G-tube is for drainage.  He states that he gets nutrition from a separate port.  Patient states that he does eat some p.o. Patient states that he has had this G-tube in for about 5 months.  Patient states he is supposed to get a Jim next week.  He states that after his G-tube fell out tonight, he called his doctor who told him to present to the ED for replacement.  Patient states that his G-tube fell out around 2 PM this afternoon (~7.5 hours PTA).  Patient states that he has been vomiting for the past 4 to 5 days.  He states that this is not unusual as he experiences nausea quite often.    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     Complication of anesthesia      Difficulty swallowing      Gastric ulcer, unspecified as acute or chronic, without mention of hemorrhage, perforation, or obstruction      Gastro-oesophageal reflux disease      Head injury      Hiatal hernia      Other bladder disorder      Other chronic pain      PONV (postoperative nausea and vomiting)      Severe malnutrition (H)     TPN     Short gut syndrome      Tobacco abuse      Past Surgical History:   Procedure Laterality Date     AMPUTATION       APPENDECTOMY       BACK SURGERY  11/3/2014    curve in the spine     BIOPSY LYMPH NODE CERVICAL N/A 2/20/2015    Procedure:  BIOPSY LYMPH NODE CERVICAL;  Surgeon: Baron Scanlon MD;  Location: [facility]     C GASTRIC BYPASS,OBESE<100CM SHAYLEE-EN-Y  2002    lost 300 pounds     CHOLECYSTECTOMY       COLONOSCOPY N/A 7/14/2021    Procedure: COLONOSCOPY;  Surgeon: Jimbo Estrada MD;  Location: [facility] OR     DISCECTOMY, FUSION CERVICAL ANTERIOR ONE LEVEL, COMBINED N/A 2/15/2017    Procedure: COMBINED DISCECTOMY, FUSION CERVICAL ANTERIOR ONE LEVEL;  Surgeon: Darren Campos MD;  [facility]     ENDOSCOPIC INSERTION TUBE GASTROSTOMY  9/9/2013    Procedure: ENDOSCOPIC INSERTION TUBE GASTROSTOMY;;  Surgeon: Francis Vyas MD;  [facility]     ENDOSCOPIC ULTRASOUND UPPER GASTROINTESTINAL TRACT (GI)  4/29/2011    Procedure:ENDOSCOPIC ULTRASOUND UPPER GASTROINTESTINAL TRACT (GI); Both Procedures done Conjointly; Surgeon:NEREIDA HOUSER; [facility]     ENDOSCOPIC ULTRASOUND UPPER GASTROINTESTINAL TRACT (GI)  9/9/2013    Procedure: ENDOSCOPIC ULTRASOUND UPPER GASTROINTESTINAL TRACT (GI);  Endoscopic Ultrasound Guide Gastrostomy Tube Placement  C-arm;  Surgeon: Noe Lizarraga MD;  [facility]     ENDOSCOPIC ULTRASOUND UPPER GASTROINTESTINAL TRACT (GI) N/A 2/24/2021    Procedure: ENDOSCOPIC ULTRASOUND, ESOPHAGOSCOPY / UPPER GASTROINTESTINAL TRACT (GI), esophagastrogastroduodenoscopy;  Surgeon: Berny Bach MD;  [facility]     ENDOSCOPY  03/25/11    EGD, [facility]     ENDOSCOPY  08/04/09    Upper Endoscopy, [facility]     ENDOSCOPY  01/05/09    Upper Endoscopy, [facility]     ESOPHAGOSCOPY, GASTROSCOPY, DUODENOSCOPY (EGD), COMBINED  4/20/2011    Procedure:COMBINED ESOPHAGOSCOPY, GASTROSCOPY, DUODENOSCOPY (EGD); Surgeon:FRANCIS VYAS; [facility]     ESOPHAGOSCOPY, GASTROSCOPY, DUODENOSCOPY (EGD), COMBINED  6/15/2011    Procedure:COMBINED ESOPHAGOSCOPY, GASTROSCOPY, DUODENOSCOPY (EGD); Surgeon:FRANCIS VYAS; [facility]     ESOPHAGOSCOPY, GASTROSCOPY,  DUODENOSCOPY (EGD), COMBINED  6/12/2013    Procedure: COMBINED ESOPHAGOSCOPY, GASTROSCOPY, DUODENOSCOPY (EGD);;  Surgeon: Francis Vyas MD;  Location:  GI     ESOPHAGOSCOPY, GASTROSCOPY, DUODENOSCOPY (EGD), COMBINED  11/22/2013    Procedure: COMBINED ESOPHAGOSCOPY, GASTROSCOPY, DUODENOSCOPY (EGD);;  Surgeon: Francis Vyas MD;  [facility]     ESOPHAGOSCOPY, GASTROSCOPY, DUODENOSCOPY (EGD), COMBINED  4/30/2014    Procedure: COMBINED ESOPHAGOSCOPY, GASTROSCOPY, DUODENOSCOPY (EGD);  Surgeon: Francis Vyas MD;  Location:  GI     ESOPHAGOSCOPY, GASTROSCOPY, DUODENOSCOPY (EGD), COMBINED N/A 2/20/2015    Procedure: COMBINED ESOPHAGOSCOPY, GASTROSCOPY, DUODENOSCOPY (EGD), BIOPSY SINGLE OR MULTIPLE;  Surgeon: Baron Scanlon MD;  Location:  OR     ESOPHAGOSCOPY, GASTROSCOPY, DUODENOSCOPY (EGD), COMBINED N/A 9/30/2015    Procedure: COMBINED ESOPHAGOSCOPY, GASTROSCOPY, DUODENOSCOPY (EGD);  Surgeon: Francis Vyas MD;  Location:  GI     ESOPHAGOSCOPY, GASTROSCOPY, DUODENOSCOPY (EGD), COMBINED N/A 10/3/2019    Procedure: Upper Endoscopy;  Surgeon: Clif Morrow MD;  [facility]     GASTRECTOMY  6/22/2012    Procedure: GASTRECTOMY;  Open Approach, Excise Ulcers,Partial Gastrectomy, Esophagojejunostomy, Hiatal Hernia Repair, Extensive Lysis of Adhesions and Esaphagogastrodudenoscopy.;  Surgeon: Francis Vyas MD;  [facility]     GASTROJEJUNOSTOMY  08/26/09    Extensice enterolysis, partial resect. jejunum, part. resect gastric pouch, gastrojejunostomy anastomosis     HC ESOPH/GAS REFLUX TEST W NASAL IMPED ELECTRODE  8/5/2013    Procedure: ESOPHAGEAL IMPEDENCE FUNCTION TEST 1 HOUR OR LESS;  Surgeon: Halie Lang MD;  Location:  GI     HEAD & NECK SURGERY  2/15/2017    C5-C6     HERNIA REPAIR  2006    Umbilical hernia     HERNIORRHAPHY HIATAL  6/22/2012    Procedure: HERNIORRHAPHY HIATAL;;  Surgeon: Francis Vyas MD;  [facility]      HERNIORRHAPHY INGUINAL  11/22/2013    Procedure: HERNIORRHAPHY INGUINAL;;  Surgeon: Francis Vyas MD;  [facility]     INSERT PICC LINE Right 12/19/2019    Procedure: Picc Placement;  Surgeon: Per Dumont PA-C;  [facility]     INSERT PICC LINE Right 2/21/2020    Procedure: INSERTION, PICC;  Surgeon: Per Dumont PA-C;  [facility]     INSERT PORT VASCULAR ACCESS Right 12/19/2017    Procedure: INSERT PORT VASCULAR ACCESS;  Right Chest Port Placement ;  Surgeon: Lisandro Alejandro PA-C;  [facility]     INSERT PORT VASCULAR ACCESS Right 8/2/2018    Procedure: INSERT PORT VASCULAR ACCESS;  Place single lumen tunneled central venous access catheter;  Surgeon: Guy Jamil PA-C;  [facility]     IR CVC TUNNEL PLACEMENT > 5 YRS OF AGE  8/7/2019     IR CVC TUNNEL PLACEMENT > 5 YRS OF AGE  4/14/2020     IR CVC TUNNEL PLACEMENT > 5 YRS OF AGE  8/3/2020     IR CVC TUNNEL PLACEMENT > 5 YRS OF AGE  9/4/2020     IR CVC TUNNEL PLACEMENT > 5 YRS OF AGE  2/5/2021     IR CVC TUNNEL PLACEMENT > 5 YRS OF AGE  3/23/2021     IR CVC TUNNEL REMOVAL RIGHT  10/1/2019     IR CVC TUNNEL REMOVAL RIGHT  7/30/2020     IR CVC TUNNEL REMOVAL RIGHT  9/2/2020     IR CVC TUNNEL REMOVAL RIGHT  2/3/2021     IR CVC TUNNEL REMOVAL RIGHT  3/19/2021     IR CVC TUNNEL REVISION RIGHT  5/7/2021     IR FOLLOW UP VISIT OUTPATIENT  8/7/2019     IR PICC PLACEMENT > 5 YRS OF AGE  3/7/2019     IR PICC PLACEMENT > 5 YRS OF AGE  12/19/2019     IR PICC PLACEMENT > 5 YRS OF AGE  2/21/2020     LAPAROTOMY EXPLORATORY  11/22/2013    Procedure: LAPAROTOMY EXPLORATORY;  Exploratory Laparotomy, Upper Endoscopy, Left Inguinal Hernia Repair;  Surgeon: Francis Vyas MD;  [facility]     ORTHOPEDIC SURGERY       PICC INSERTION Right 03/16/2017    5fr DL BioFlo PICC, 42cm (3cm external) in the R medial brachial vein w/ tip in the SVC RA junction.     PICC INSERTION Left 09/23/2017    5fr DL BioFlo PICC, 45cm  "(1cm external) in the L basilic vein w/ tip in the SVC RA junction.     PICC INSERTION Right 05/16/2019    5Fr - 43cm, Medial brachial vein, low SVC     PICC INSERTION Right 10/02/2019    5Fr - 43cm (2cm external), basilic vein, low SVC     SHAYLEE EN Y BOWEL  2003     SOFT TISSUE SURGERY       THORACIC SURGERY       TONSILLECTOMY       TRANSESOPHAGEAL ECHOCARDIOGRAM INTRAOPERATIVE N/A 1/8/2019    Procedure: TRANSESOPHAGEAL ECHOCARDIOGRAM INTRAOPERATIVE;  Surgeon: GENERIC ANESTHESIA PROVIDER;  [facility]     acetaminophen (TYLENOL) 32 mg/mL liquid  albuterol (PROAIR HFA/PROVENTIL HFA/VENTOLIN HFA) 108 (90 Base) MCG/ACT inhaler  amphetamine-dextroamphetamine (ADDERALL) 20 MG tablet  bisacodyl (DULCOLAX) 5 MG EC tablet  carvedilol (COREG) 6.25 MG tablet  cyanocobalamin (CYANOCOBALAMIN) 1000 MCG/ML injection  diphenhydrAMINE (BENADRYL) 12.5 MG/5ML syrup  EPINEPHrine (ANY BX GENERIC EQUIV) 0.3 MG/0.3ML injection 2-pack  [facility] INFUSION MANAGED PATIENT  fentaNYL (DURAGESIC) 25 mcg/hr 72 hr patch  insulin syringe-needle U-100 (29G X 1/2\" 1 ML) 29G X 1/2\" 1 ML miscellaneous  lidocaine (LIDODERM) 5 % patch  LORazepam (ATIVAN) 1 MG tablet  magnesium citrate solution  naloxone (NARCAN) 4 MG/0.1ML nasal spray  nicotine (COMMIT) 2 MG lozenge  nicotine (NICODERM CQ) 7 MG/24HR 24 hr patch  nystatin (MYCOSTATIN) 369204 UNIT/GM external cream  ondansetron (ZOFRAN-ODT) 8 MG ODT tab  oxyCODONE (ROXICODONE) 5 MG/5ML solution  pantoprazole (PROTONIX) 40 MG EC tablet  parenteral nutrition - PTA/DISCHARGE ORDER  Pediatric Multiple Vitamins (INFUVITE PEDIATRIC) SOLN injection  polyethylene glycol (GOLYTELY) 236 g suspension  polyethylene glycol (MIRALAX) 17 GM/Dose powder  QUEtiapine (SEROQUEL) 25 MG tablet  sodium chloride 0.45% SOLN BOLUS  sucralfate (CARAFATE) 1 GM/10ML suspension  VENTOLIN  (90 Base) MCG/ACT inhaler  vitamin D2 (ERGOCALCIFEROL) 58309 units (1250 mcg) capsule      Allergies   Allergen Reactions     " Bactrim [Sulfamethoxazole W/Trimethoprim] Rash     Penicillins Anaphylaxis     Please see Antimicrobial Management Team allergy assessment note 10/10/2018. Patient reported tolerating amoxicillin.     Doxycycline Rash     Vancomycin Rash     Rash after receiving vancomycin 3/28/16 (red man's?). Tolerated with slower infusion and diphenhydramine premed.     Family History  Family History   Problem Relation Age of Onset     Gastrointestinal Disease Mother         Crohns disease     Anxiety Disorder Mother      Thyroid Disease Mother         Grave's disease     Cancer Father         ear cancer-skin cancer/melanoma     Breast Cancer Maternal Grandmother      Macular Degeneration Maternal Grandfather      Anxiety Disorder Sister      Diabetes Maternal Uncle      Breast Cancer Other      Hypertension No family hx of      Hyperlipidemia No family hx of      Cerebrovascular Disease No family hx of      Prostate Cancer No family hx of      Depression No family hx of      Anesthesia Reaction No family hx of      Asthma No family hx of      Osteoporosis No family hx of      Genetic Disorder No family hx of      Obesity No family hx of      Mental Illness No family hx of      Substance Abuse No family hx of      Glaucoma No family hx of      Social History   Social History     Tobacco Use     Smoking status: Current Some Day Smoker     Types: Cigarettes     Smokeless tobacco: Never Used     Tobacco comment: 2/4/2021    smokes 3 cigarettes/day   Substance Use Topics     Alcohol use: No     Comment: quit 2002     Drug use: No      Past medical history, past surgical history, medications, allergies, family history, and social history were reviewed with the patient. No additional pertinent items.       Review of Systems   Constitutional: Negative for chills, fatigue and fever.   HENT: Negative for congestion and sore throat.    Eyes: Negative for pain and visual disturbance.   Respiratory: Negative for cough, chest tightness and  shortness of breath.    Cardiovascular: Negative for chest pain and palpitations.   Gastrointestinal: Positive for nausea and vomiting. Negative for abdominal distention, abdominal pain, constipation and diarrhea.   Genitourinary: Negative for difficulty urinating, dysuria, frequency and urgency.        Pos for displaced G-tube   Musculoskeletal: Negative for arthralgias, back pain, myalgias and neck pain.   Skin: Negative for color change and rash.   Neurological: Negative for dizziness, weakness and headaches.   Psychiatric/Behavioral: Negative for confusion.       Physical Exam   BP: (!) 142/95  Pulse: 81  Temp: 99.1  F (37.3  C)  Resp: 18  SpO2: 98 %  Physical Exam  Vitals and nursing note reviewed.   Constitutional:       General: He is not in acute distress.     Appearance: Normal appearance. He is not ill-appearing or toxic-appearing.   HENT:      Head: Normocephalic and atraumatic.      Nose: Nose normal.      Mouth/Throat:      Mouth: Mucous membranes are moist.   Eyes:      Pupils: Pupils are equal, round, and reactive to light.   Cardiovascular:      Rate and Rhythm: Normal rate.      Pulses: Normal pulses.      Heart sounds: Normal heart sounds.   Pulmonary:      Effort: Pulmonary effort is normal. No respiratory distress.      Breath sounds: Normal breath sounds.   Abdominal:      General: Abdomen is flat. There is no distension.       Musculoskeletal:         General: No swelling or deformity. Normal range of motion.      Cervical back: Normal range of motion. No rigidity.   Skin:     General: Skin is warm.      Capillary Refill: Capillary refill takes less than 2 seconds.   Neurological:      Mental Status: He is alert and oriented to person, place, and time.   Psychiatric:         Mood and Affect: Mood normal.         ED Course     11:37 PM  The patient was seen and examined by Laurent Abad DO in Room ED18.     [facility]    Feeding Tube  Replacement    Date/Time: 10/8/2021 10:06 PM  Performed by: Laurent Abad DO  Authorized by: Laurent Abad DO       PRE-PROCEDURE DETAILS     Old tube size:  16 Fr      PROCEDURE DETAILS     Patient position:  Recumbent    Procedure type:  Replacement    Tube type:  Gastrostomy    Tube size:  12 Fr    Bulb inflation volume:  10cc    Bulb inflation fluid:  Normal saline    POST PROCEDURE DETAILS      Placement difficulty:  Moderate    Bleeding:  None    PROCEDURE Describe Procedure: Attempted replacement with original 16 [place]amanian G-tube but was unable.  Subsequently able to place a 12 Panamanian coudé catheter         The medical record was reviewed and interpreted.       No results found for any visits on 10/06/21.  Medications - No data to display     Assessments & Plan (with Medical Decision Making)   Patient presents for G-tube dislodgment.  Normally has a 16 [place]amanian tube.  Has appointment next week to replace with Jim button.  He does not have any additional complaints.  He uses this to for aspiration of gastric contents.  Does not have any feedings or medications through the tube.    Attempted replacement with a 16 [place]amanian G-tube, but was unable.  Was able to pass a 12 Panamanian coudé catheter to keep tract open.  Discussed with gastroenterology team.  They recommend admit patient to obs so the GI team can see him in the morning.  No reason for xray confirmation given that tube is solely a placeholder. I discussed this recommendation with the patient.  He states that he does not want to stay overnight.  He says he will call his GI doctors tomorrow and is confident he will be able to to get in to get this replaced.  I informed him that this is not a good plan.  He could risk further closing of the tract and increase risk of repeat endoscopic replacment.  Patient reiterated his decision to leave AGAINST MEDICAL ADVICE.  Invited to return to the ED if he changes mind.    I have reviewed the nursing  notes. I have reviewed the findings, diagnosis, plan and need for follow up with the patient.    Discharge Medication List as of 10/7/2021 12:58 AM          Final diagnoses:   Gastrostomy tube dysfunction (H)   IVic, am serving as a trained medical scribe to document services personally performed by Laurent Abad DO, based on the provider's statements to me.  ILaurent DO, was physically present and have reviewed and verified the accuracy of this note documented by Vic Rodriges.    --  [facility] EMERGENCY DEPARTMENT  10/6/2021     Laurent Abad DO  10/08/21 4553

## 2021-10-12 ENCOUNTER — MYC REFILL (OUTPATIENT)
Dept: INTERNAL MEDICINE | Facility: CLINIC | Age: 51
End: 2021-10-12

## 2021-10-12 DIAGNOSIS — F90.0 ADHD (ATTENTION DEFICIT HYPERACTIVITY DISORDER), INATTENTIVE TYPE: ICD-10-CM

## 2021-10-12 RX ORDER — DEXTROAMPHETAMINE SACCHARATE, AMPHETAMINE ASPARTATE, DEXTROAMPHETAMINE SULFATE AND AMPHETAMINE SULFATE 5; 5; 5; 5 MG/1; MG/1; MG/1; MG/1
TABLET ORAL
Qty: 30 TABLET | Refills: 0 | Status: SHIPPED | OUTPATIENT
Start: 2021-10-12 | End: 2021-11-09

## 2021-10-12 RX ORDER — LORAZEPAM 1 MG/1
TABLET ORAL
Qty: 30 TABLET | Refills: 0 | Status: SHIPPED | OUTPATIENT
Start: 2021-10-12 | End: 2021-11-09

## 2021-10-12 NOTE — TELEPHONE ENCOUNTER
Pending Prescriptions:                       Disp   Refills    LORazepam (ATIVAN) 1 MG tablet             30 tab*0        Sig: TAKE 1 TABLET (1MG) BY MOUTH AS NEEDED FOR ANXIETY           WITH TPN AND MEDICATIONS . JUST ONCE A DAY (30 TO           LAST 30 DAYS)    amphetamine-dextroamphetamine (ADDERALL) 2*30 tab*0        Sig: TAKE ONE TABLET BY MOUTH ONCE DAILY    Routing refill request to provider for review/approval because:  Drug not on the [facility] refill protocol

## 2021-10-13 DIAGNOSIS — B37.2 YEAST INFECTION OF THE SKIN: ICD-10-CM

## 2021-10-14 ENCOUNTER — OFFICE VISIT (OUTPATIENT)
Dept: ENDOCRINOLOGY | Facility: CLINIC | Age: 51
End: 2021-10-14
Payer: COMMERCIAL

## 2021-10-14 ENCOUNTER — MYC MEDICAL ADVICE (OUTPATIENT)
Dept: PALLIATIVE MEDICINE | Facility: CLINIC | Age: 51
End: 2021-10-14

## 2021-10-14 VITALS
WEIGHT: 191.8 LBS | HEART RATE: 74 BPM | OXYGEN SATURATION: 99 % | HEIGHT: 71 IN | BODY MASS INDEX: 26.85 KG/M2 | SYSTOLIC BLOOD PRESSURE: 139 MMHG | DIASTOLIC BLOOD PRESSURE: 94 MMHG

## 2021-10-14 DIAGNOSIS — R10.9 CHRONIC ABDOMINAL PAIN: ICD-10-CM

## 2021-10-14 DIAGNOSIS — G89.4 CHRONIC PAIN SYNDROME: ICD-10-CM

## 2021-10-14 DIAGNOSIS — E44.0 MODERATE PROTEIN-CALORIE MALNUTRITION (H): Primary | ICD-10-CM

## 2021-10-14 DIAGNOSIS — G89.29 CHRONIC ABDOMINAL PAIN: ICD-10-CM

## 2021-10-14 DIAGNOSIS — K31.1 GASTRIC OUTLET OBSTRUCTION: ICD-10-CM

## 2021-10-14 PROCEDURE — 43246 EGD PLACE GASTROSTOMY TUBE: CPT | Performed by: SURGERY

## 2021-10-14 RX ORDER — NYSTATIN 100000 U/G
CREAM TOPICAL 2 TIMES DAILY
Qty: 30 G | Refills: 0 | Status: SHIPPED | OUTPATIENT
Start: 2021-10-14 | End: 2022-07-20

## 2021-10-14 ASSESSMENT — MIFFLIN-ST. JEOR: SCORE: 1762.13

## 2021-10-14 ASSESSMENT — PAIN SCALES - GENERAL: PAINLEVEL: NO PAIN (0)

## 2021-10-14 NOTE — PROGRESS NOTES
Return Bariatric Surgery Note    RE: Parker Acevedo  MR#: [id number]  : 1970  VISIT DATE: Oct 14, 2021    Dear Dr. Isaac,    I had the pleasure of seeing your patient, Parker Acevedo, in my post-bariatric surgery assessment clinic.    CHIEF COMPLAINT: G tube replacement    Last seen in clinic 2021 for G tube replacement, but tube was not available. G tube is in the gastric remnant for decompression for nausea; was placed 2021 by Dr. Vyas with Dr. Bach. His G tube did fall out on 10/6/2021 and was replaced in the ED with a 12F coudé catheter marina to keep the tract patent. The patient has been doing otherwise well, no dehydration. Uses his G tube for decompression a few times a week and feels like it's working well.    Interested in marina replacement to COLIN g-tube.    HISTORY OF PRESENT ILLNESS:  Questions Regarding Prior Weight Loss Surgery Reviewed With Patient 10/13/2021   I had the following weight loss procedure: Celestino-en-y Gastric Bypass   What year was your surgery?    How has your weight changed since your last visit? I have stayed about the same   Are you currently taking any weight loss medications? No   Do you currently have any of the following: Heartburn, acid reflux, or GERD (acid reflux disease)?   Have you been to the Emergency room since your last visit with us? Yes   Were you in the hospital since your last visit with us? No   Do you have any concerns today? Trouble swallowing and what's the plan       Weight History:     10/13/2021   What is your highest lifetime weight? 500   What is your lowest weight since surgery? (In pounds) 141        Weight: 87 kg (191 lb 12.8 oz)             Questions Regarding Co-Morbidities and Health Concerns Reviewed With Patient 10/13/2021   Pre-diabetes: Never   Diabetes II: Never   High Blood Pressure: Gone Away   High cholesterol: Never   Heartburn/Reflux: Stayed the same   Are you taking daily medication for heartburn, acid reflux,  "or GERD (acid reflux disease)? Yes   Sleep apnea: Never   PCOS: Never   Back pain: Stayed the same   Joint pain: Stayed the same   Lower leg swelling: Stayed the same       Eating Habits 10/13/2021   How many meals do you eat per day? 1   Do you snack between meals? Sometimes   How much food are you eating at each meal? Less than 1/2 cup   Are you able to separate your meals and liquids by at least 30 minutes? Yes   Are you able to avoid liquid calories? Sometimes       Exercise Questions Reviewed With Patient 10/13/2021   How often do you exercise? Less than 1 time per week   What is the duration of your exercise (in minutes)? 10 Minutes   What types of exercise do you do? walking   What keeps you from being more active?  I am as active as I can possbily be       Social History:      10/13/2021   Are you smoking? Yes   How much are you smoking? 6 cigarettes a day   Are you drinking alcohol? No       Medications:  Current Outpatient Medications   Medication     acetaminophen (TYLENOL) 32 mg/mL liquid     albuterol (PROAIR HFA/PROVENTIL HFA/VENTOLIN HFA) 108 (90 Base) MCG/ACT inhaler     amphetamine-dextroamphetamine (ADDERALL) 20 MG tablet     bisacodyl (DULCOLAX) 5 MG EC tablet     carvedilol (COREG) 6.25 MG tablet     cyanocobalamin (CYANOCOBALAMIN) 1000 MCG/ML injection     diphenhydrAMINE (BENADRYL) 12.5 MG/5ML syrup     EPINEPHrine (ANY BX GENERIC EQUIV) 0.3 MG/0.3ML injection 2-pack     [facility] INFUSION MANAGED PATIENT     fentaNYL (DURAGESIC) 25 mcg/hr 72 hr patch     insulin syringe-needle U-100 (29G X 1/2\" 1 ML) 29G X 1/2\" 1 ML miscellaneous     lidocaine (LIDODERM) 5 % patch     LORazepam (ATIVAN) 1 MG tablet     magnesium citrate solution     naloxone (NARCAN) 4 MG/0.1ML nasal spray     nicotine (COMMIT) 2 MG lozenge     nicotine (NICODERM CQ) 7 MG/24HR 24 hr patch     nystatin (MYCOSTATIN) 128419 UNIT/GM external cream     ondansetron (ZOFRAN-ODT) 8 MG ODT tab     oxyCODONE (ROXICODONE) 5 MG/5ML " "solution     pantoprazole (PROTONIX) 40 MG EC tablet     parenteral nutrition - PTA/DISCHARGE ORDER     Pediatric Multiple Vitamins (INFUVITE PEDIATRIC) SOLN injection     polyethylene glycol (GOLYTELY) 236 g suspension     polyethylene glycol (MIRALAX) 17 GM/Dose powder     QUEtiapine (SEROQUEL) 25 MG tablet     sodium chloride 0.45% SOLN BOLUS     sucralfate (CARAFATE) 1 GM/10ML suspension     VENTOLIN  (90 Base) MCG/ACT inhaler     vitamin D2 (ERGOCALCIFEROL) 58937 units (1250 mcg) capsule     No current facility-administered medications for this visit.         10/13/2021   Do you avoid NSAIDs such as (Ibuprofen, Aleve, Naproxen, Advil)?   Yes       ROS:  GI:      10/13/2021   Vomiting: Yes   Diarrhea: Yes   Constipation: No   Swallowing trouble: Yes   Abdominal pain: Yes   Heartburn: No   Rash in skin folds: No   Depression: No   Stress urinary incontinence No     Skin:   BAR RBS ROS - SKIN 10/13/2021   Rash in skin folds: No     Psych:      10/13/2021   Depression: No   Anxiety: Yes     Female Only: No flowsheet data found.    LABS/IMAGING/MEDICAL RECORDS REVIEW: Reviewed ER note from 10/6/2021. No new imaging or labs.    PHYSICAL EXAMINATION:  BP (!) 139/94 (BP Location: Left arm, Patient Position: Chair, Cuff Size: Adult Regular)   Pulse 74   Ht 1.803 m (5' 11\")   Wt 87 kg (191 lb 12.8 oz)   SpO2 99%   BMI 26.75 kg/m     Well-appearing man in NAD sitting comfortably  Sanchez catheter in gastrostomy site in LUQ removed and tract patent, replaced with a 16F 2-cm COLIN G tube with ease    ASSESSMENT AND PLAN:    1. Many years status laparoscopic gastric bypass (in 2003) with revisions and malnutrition requiring TPN from bowel dysmotility.  2. Morbid Obesity historical current BMI: Body mass index is 28.3 kg/m .  3. Post surgical malabsorption - on TPN as above  4. Return to clinic PRN    G tube replaced in clinic with a 16F 2-cm COLIN G tube (in gastric remnant).  I was present for all critical " components of this procedure.    Physician Attestation  I, Francis Vyas, saw and evaluated this patient as part of a shared visit.  I have reviewed and discussed with the advanced practice provider and/or resident their history, physical and plan.    I personally reviewed the vital signs, medications, labs and imaging.    My key history or physical exam findings: has g-tube tract patent; skin healthy; marina in place; this was removed.    Key management decisions made by me: replace marina with COLIN g-tube, 2cm, 16Fr    Francis Vyas MD  Date of Service (when I saw the patient): 10/14/21        Note initially prepared by:    Sara Griffin MD    Seen and discussed with Dr. Vyas.    I spent a total of 15 minutes face to face with Parker during today's office visit. Over 50% of this time was spent counseling the patient and/or coordinating care.

## 2021-10-14 NOTE — NURSING NOTE
"Chief Complaint   Patient presents with     Follow Up     Follow-up S/P Bariatric surgery here for G-Tube Exchange       Vitals:    10/14/21 1109   BP: (!) 139/94   BP Location: Left arm   Patient Position: Chair   Cuff Size: Adult Regular   Pulse: 74   SpO2: 99%   Weight: 87 kg (191 lb 12.8 oz)   Height: 1.803 m (5' 11\")       Body mass index is 26.75 kg/m .                          "

## 2021-10-14 NOTE — TELEPHONE ENCOUNTER
Parker,  Thanks for checking in.  I am ok with an increase up to 40mg/day.  I would like to do that for 6 days, as it make it so that your script will be due 2 days earlier.  Is that reasonable?     Let me know, as I can begin the refills for those, as it looks like it is coming up soon.     Jesisca Milligan MD  [facility]       I should of course clarify that I am talking about the oxycodone!     MD MYRIAM Alex [facility]

## 2021-10-14 NOTE — LETTER
10/14/2021       RE: Parker Acevedo  [address]  [facility] [address]     Dear Colleague,    Thank you for referring your patient, Parker Acevedo, to the [facility] [place] at [facility]. Please see a copy of my visit note below.        Return Bariatric Surgery Note    RE: Parker Acevedo  MR#: [id number]  : 1970  VISIT DATE: Oct 14, 2021    Dear Dr. Isaac,    I had the pleasure of seeing your patient, Parker Acevedo, in my post-bariatric surgery assessment clinic.    CHIEF COMPLAINT: G tube replacement    Last seen in clinic 2021 for G tube replacement, but tube was not available. G tube is in the gastric remnant for decompression for nausea; was placed 2021 by Dr. Vyas with Dr. Bach. His G tube did fall out on 10/6/2021 and was replaced in the ED with a 12F coudé catheter marina to keep the tract patent. The patient has been doing otherwise well, no dehydration. Uses his G tube for decompression a few times a week and feels like it's working well.    Interested in marina replacement to COLIN g-tube.    HISTORY OF PRESENT ILLNESS:  Questions Regarding Prior Weight Loss Surgery Reviewed With Patient 10/13/2021   I had the following weight loss procedure: Celestino-en-y Gastric Bypass   What year was your surgery?    How has your weight changed since your last visit? I have stayed about the same   Are you currently taking any weight loss medications? No   Do you currently have any of the following: Heartburn, acid reflux, or GERD (acid reflux disease)?   Have you been to the Emergency room since your last visit with us? Yes   Were you in the hospital since your last visit with us? No   Do you have any concerns today? Trouble swallowing and what's the plan       Weight History:     10/13/2021   What is your highest lifetime weight? 500   What is your lowest weight since surgery? (In pounds) 141       "  Weight: 87 kg (191 lb 12.8 oz)             Questions Regarding Co-Morbidities and Health Concerns Reviewed With Patient 10/13/2021   Pre-diabetes: Never   Diabetes II: Never   High Blood Pressure: Gone Away   High cholesterol: Never   Heartburn/Reflux: Stayed the same   Are you taking daily medication for heartburn, acid reflux, or GERD (acid reflux disease)? Yes   Sleep apnea: Never   PCOS: Never   Back pain: Stayed the same   Joint pain: Stayed the same   Lower leg swelling: Stayed the same       Eating Habits 10/13/2021   How many meals do you eat per day? 1   Do you snack between meals? Sometimes   How much food are you eating at each meal? Less than 1/2 cup   Are you able to separate your meals and liquids by at least 30 minutes? Yes   Are you able to avoid liquid calories? Sometimes       Exercise Questions Reviewed With Patient 10/13/2021   How often do you exercise? Less than 1 time per week   What is the duration of your exercise (in minutes)? 10 Minutes   What types of exercise do you do? walking   What keeps you from being more active?  I am as active as I can possbily be       Social History:      10/13/2021   Are you smoking? Yes   How much are you smoking? 6 cigarettes a day   Are you drinking alcohol? No       Medications:  Current Outpatient Medications   Medication     acetaminophen (TYLENOL) 32 mg/mL liquid     albuterol (PROAIR HFA/PROVENTIL HFA/VENTOLIN HFA) 108 (90 Base) MCG/ACT inhaler     amphetamine-dextroamphetamine (ADDERALL) 20 MG tablet     bisacodyl (DULCOLAX) 5 MG EC tablet     carvedilol (COREG) 6.25 MG tablet     cyanocobalamin (CYANOCOBALAMIN) 1000 MCG/ML injection     diphenhydrAMINE (BENADRYL) 12.5 MG/5ML syrup     EPINEPHrine (ANY BX GENERIC EQUIV) 0.3 MG/0.3ML injection 2-pack     [facility]     fentaNYL (DURAGESIC) 25 mcg/hr 72 hr patch     insulin syringe-needle U-100 (29G X 1/2\" 1 ML) 29G X 1/2\" 1 ML miscellaneous     lidocaine (LIDODERM) 5 % " "patch     LORazepam (ATIVAN) 1 MG tablet     magnesium citrate solution     naloxone (NARCAN) 4 MG/0.1ML nasal spray     nicotine (COMMIT) 2 MG lozenge     nicotine (NICODERM CQ) 7 MG/24HR 24 hr patch     nystatin (MYCOSTATIN) 904803 UNIT/GM external cream     ondansetron (ZOFRAN-ODT) 8 MG ODT tab     oxyCODONE (ROXICODONE) 5 MG/5ML solution     pantoprazole (PROTONIX) 40 MG EC tablet     parenteral nutrition - PTA/DISCHARGE ORDER     Pediatric Multiple Vitamins (INFUVITE PEDIATRIC) SOLN injection     polyethylene glycol (GOLYTELY) 236 g suspension     polyethylene glycol (MIRALAX) 17 GM/Dose powder     QUEtiapine (SEROQUEL) 25 MG tablet     sodium chloride 0.45% SOLN BOLUS     sucralfate (CARAFATE) 1 GM/10ML suspension     VENTOLIN  (90 Base) MCG/ACT inhaler     vitamin D2 (ERGOCALCIFEROL) 74274 units (1250 mcg) capsule     No current facility-administered medications for this visit.         10/13/2021   Do you avoid NSAIDs such as (Ibuprofen, Aleve, Naproxen, Advil)?   Yes       ROS:  GI:      10/13/2021   Vomiting: Yes   Diarrhea: Yes   Constipation: No   Swallowing trouble: Yes   Abdominal pain: Yes   Heartburn: No   Rash in skin folds: No   Depression: No   Stress urinary incontinence No     Skin:   BAR RBS ROS - SKIN 10/13/2021   Rash in skin folds: No     Psych:      10/13/2021   Depression: No   Anxiety: Yes     Female Only: No flowsheet data found.    LABS/IMAGING/MEDICAL RECORDS REVIEW: Reviewed ER note from 10/6/2021. No new imaging or labs.    PHYSICAL EXAMINATION:  BP (!) 139/94 (BP Location: Left arm, Patient Position: Chair, Cuff Size: Adult Regular)   Pulse 74   Ht 1.803 m (5' 11\")   Wt 87 kg (191 lb 12.8 oz)   SpO2 99%   BMI 26.75 kg/m     Well-appearing man in NAD sitting comfortably  Sanchez catheter in gastrostomy site in LUQ removed and tract patent, replaced with a 16F 2-cm COLIN G tube with ease    ASSESSMENT AND PLAN:    1. Many years status laparoscopic gastric bypass (in 2003) " with revisions and malnutrition requiring TPN from bowel dysmotility.  2. Morbid Obesity historical current BMI: Body mass index is 28.3 kg/m .  3. Post surgical malabsorption - on TPN as above  4. Return to clinic PRN    G tube replaced in clinic with a 16F 2-cm COLIN G tube (in gastric remnant).  I was present for all critical components of this procedure.    Physician Attestation  I, Francis Vyas, saw and evaluated this patient as part of a shared visit.  I have reviewed and discussed with the advanced practice provider and/or resident their history, physical and plan.    I personally reviewed the vital signs, medications, labs and imaging.    My key history or physical exam findings: has g-tube tract patent; skin healthy; marina in place; this was removed.    Key management decisions made by me: replace marina with COLIN g-tube, 2cm, 16Fr    Francis Vyas MD  Date of Service (when I saw the patient): 10/14/21        Note initially prepared by:    Sara Griffin MD    Seen and discussed with Dr. Vyas.    I spent a total of 15 minutes face to face with Parker during today's office visit. Over 50% of this time was spent counseling the patient and/or coordinating care.

## 2021-10-14 NOTE — TELEPHONE ENCOUNTER
From: Parker Acevedo      Created: 10/14/2021 2:23 PM        Hi I'm writing to you to see if I could go up an extra 10 mg for a week I just had my G tube replaced to a Jim tube and it hurts so bad please let me know or if you have questions feel free to call me at 7  thank you

## 2021-10-15 ENCOUNTER — HOME INFUSION (PRE-WILLOW HOME INFUSION) (OUTPATIENT)
Dept: PHARMACY | Facility: CLINIC | Age: 51
End: 2021-10-15

## 2021-10-15 RX ORDER — OXYCODONE HCL 5 MG/5 ML
5-10 SOLUTION, ORAL ORAL 3 TIMES DAILY PRN
Qty: 900 ML | Refills: 0 | Status: SHIPPED | OUTPATIENT
Start: 2021-10-15 | End: 2021-11-15

## 2021-10-15 RX ORDER — FENTANYL 25 UG/1
1 PATCH TRANSDERMAL
Qty: 15 PATCH | Refills: 0 | Status: SHIPPED | OUTPATIENT
Start: 2021-10-15 | End: 2021-11-15

## 2021-10-15 NOTE — TELEPHONE ENCOUNTER
From: Parker Acevedo      Created: 10/14/2021 3:32 PM        Yes that would be great 6 days is plenty thank you so much and yes I should have clarified it to for the oxycodone and as long as both can be filled on the same day thank you so much          Med note placed on oxycodone.    >> MABEL VERDIN   Fri Oct 15, 2021  1:05 PM  Approved for 40 MG/day for 6 days. Next script starts 2 days early. To start 11/03/21      Scripts pended for review.     YADIRA LazarN, RN  Care Coordinator  [facility] [place]

## 2021-10-15 NOTE — TELEPHONE ENCOUNTER
Parker,  We put that both can be filled 11/1. Insurance may have concerns about that, but you can try.  For best change, 11/2 may be better to .  You can ask the pharmacist if both can be picked up 11/1 without problem.     Jessica Jenkins MD  [facility]                      Signed Prescriptions:                        Disp   Refills    fentaNYL (DURAGESIC) 25 mcg/hr 72 hr patch 15 pat*0        Sig: Place 1 patch onto the skin every 48 hours remove old           patch.  May fill 11/01/21 and start 11/05/21  Authorizing Provider: BRANDYN JENKINS    oxyCODONE (ROXICODONE) 5 MG/5ML solution   900 mL 0        Sig: Take 5-10 mLs (5-10 mg) by mouth 3 times daily as           needed for pain Max of 30mg/day. Put at least 4           hours between doses. Fill 11/01/21 and start           11/03/21. 30 day supply.  Authorizing Provider: BRANDYN JENKINS

## 2021-10-15 NOTE — PROGRESS NOTES
This is a recent snapshot of the patient's [facility] medical record.  For current drug dose and complete information and questions, call [phone] or In Basket pool, fv home infusion ([id number])  CSN Number:  [id number]

## 2021-10-18 NOTE — PROGRESS NOTES
This is a recent snapshot of the patient's [facility] medical record.  For current drug dose and complete information and questions, call [phone] or In Basket pool, fv home infusion ([id number])  CSN Number:  [id number]

## 2021-10-19 ENCOUNTER — LAB REQUISITION (OUTPATIENT)
Dept: LAB | Facility: CLINIC | Age: 51
End: 2021-10-19
Payer: COMMERCIAL

## 2021-10-19 DIAGNOSIS — R13.10 DYSPHAGIA, UNSPECIFIED: ICD-10-CM

## 2021-10-19 LAB
ALBUMIN SERPL-MCNC: 3.5 G/DL (ref 3.4–5)
ALP SERPL-CCNC: 67 U/L (ref 40–150)
ALT SERPL W P-5'-P-CCNC: 33 U/L (ref 0–70)
ANION GAP SERPL CALCULATED.3IONS-SCNC: 5 MMOL/L (ref 3–14)
AST SERPL W P-5'-P-CCNC: 21 U/L (ref 0–45)
BASOPHILS # BLD AUTO: 0.1 10E3/UL (ref 0–0.2)
BASOPHILS NFR BLD AUTO: 1 %
BILIRUB DIRECT SERPL-MCNC: 0.1 MG/DL (ref 0–0.2)
BILIRUB SERPL-MCNC: 0.5 MG/DL (ref 0.2–1.3)
BILIRUB SERPL-MCNC: 0.5 MG/DL (ref 0.2–1.3)
BUN SERPL-MCNC: 19 MG/DL (ref 7–30)
CALCIUM SERPL-MCNC: 8.3 MG/DL (ref 8.5–10.1)
CHLORIDE BLD-SCNC: 111 MMOL/L (ref 94–109)
CO2 SERPL-SCNC: 26 MMOL/L (ref 20–32)
CREAT SERPL-MCNC: 0.91 MG/DL (ref 0.66–1.25)
EOSINOPHIL # BLD AUTO: 0.1 10E3/UL (ref 0–0.7)
EOSINOPHIL NFR BLD AUTO: 1 %
ERYTHROCYTE [DISTWIDTH] IN BLOOD BY AUTOMATED COUNT: 14.9 % (ref 10–15)
FASTING STATUS PATIENT QL REPORTED: ABNORMAL
GFR SERPL CREATININE-BSD FRML MDRD: >90 ML/MIN/1.73M2
GLUCOSE BLD-MCNC: 106 MG/DL (ref 70–99)
HCT VFR BLD AUTO: 37.3 % (ref 40–53)
HGB BLD-MCNC: 12.1 G/DL (ref 13.3–17.7)
IMM GRANULOCYTES # BLD: 0 10E3/UL
IMM GRANULOCYTES NFR BLD: 0 %
LYMPHOCYTES # BLD AUTO: 2 10E3/UL (ref 0.8–5.3)
LYMPHOCYTES NFR BLD AUTO: 25 %
MAGNESIUM SERPL-MCNC: 2.2 MG/DL (ref 1.6–2.3)
MCH RBC QN AUTO: 30.3 PG (ref 26.5–33)
MCHC RBC AUTO-ENTMCNC: 32.4 G/DL (ref 31.5–36.5)
MCV RBC AUTO: 94 FL (ref 78–100)
MONOCYTES # BLD AUTO: 0.9 10E3/UL (ref 0–1.3)
MONOCYTES NFR BLD AUTO: 12 %
NEUTROPHILS # BLD AUTO: 4.9 10E3/UL (ref 1.6–8.3)
NEUTROPHILS NFR BLD AUTO: 61 %
NRBC # BLD AUTO: 0 10E3/UL
NRBC BLD AUTO-RTO: 0 /100
PHOSPHATE SERPL-MCNC: 4.1 MG/DL (ref 2.5–4.5)
PLATELET # BLD AUTO: 192 10E3/UL (ref 150–450)
POTASSIUM BLD-SCNC: 3.5 MMOL/L (ref 3.4–5.3)
PROT SERPL-MCNC: 6.8 G/DL (ref 6.8–8.8)
RBC # BLD AUTO: 3.99 10E6/UL (ref 4.4–5.9)
SODIUM SERPL-SCNC: 142 MMOL/L (ref 133–144)
TRIGL SERPL-MCNC: 164 MG/DL
WBC # BLD AUTO: 7.9 10E3/UL (ref 4–11)

## 2021-10-19 PROCEDURE — 84478 ASSAY OF TRIGLYCERIDES: CPT | Mod: GZ | Performed by: SURGERY

## 2021-10-19 PROCEDURE — 84100 ASSAY OF PHOSPHORUS: CPT | Performed by: SURGERY

## 2021-10-19 PROCEDURE — 80053 COMPREHEN METABOLIC PANEL: CPT | Performed by: SURGERY

## 2021-10-19 PROCEDURE — 83735 ASSAY OF MAGNESIUM: CPT | Performed by: SURGERY

## 2021-10-19 PROCEDURE — 85025 COMPLETE CBC W/AUTO DIFF WBC: CPT | Mod: GZ | Performed by: SURGERY

## 2021-10-19 PROCEDURE — 82248 BILIRUBIN DIRECT: CPT | Performed by: SURGERY

## 2021-10-20 ENCOUNTER — HOME INFUSION (PRE-WILLOW HOME INFUSION) (OUTPATIENT)
Dept: PHARMACY | Facility: CLINIC | Age: 51
End: 2021-10-20

## 2021-10-21 NOTE — PROGRESS NOTES
This is a recent snapshot of the patient's [facility] medical record.  For current drug dose and complete information and questions, call [phone] or In Basket pool, fv home infusion ([id number])  CSN Number:  [id number]

## 2021-10-22 ENCOUNTER — PATIENT OUTREACH (OUTPATIENT)
Dept: FAMILY MEDICINE | Facility: CLINIC | Age: 51
End: 2021-10-22
Payer: COMMERCIAL

## 2021-10-22 ASSESSMENT — ACTIVITIES OF DAILY LIVING (ADL): DEPENDENT_IADLS:: MEDICATION MANAGEMENT;TRANSPORTATION;SHOPPING

## 2021-10-22 NOTE — PROGRESS NOTES
Clinic Care Coordination Contact    Follow Up Progress Note      Assessment: Called and spoke with wife Rose, states pt is doing ok. He was in the ED on 10/7 because his G-tube fell out again. Wife states they were in the ED for four hours and the ostomy hole almost closed up. They didn't have the size he uses so they put in a smaller one.  Pt was able to get the correct size on the 14th with endo.  States it is currently working well.  No current concerns.    Care Gaps:    Health Maintenance Due   Topic Date Due     MEDICARE ANNUAL WELLNESS VISIT  06/21/2017     INFLUENZA VACCINE (1) 09/01/2021     Care Gaps Last addressed on : 10/22/2021    Goals addressed this encounter:   Goals Addressed                    This Visit's Progress       #2 Medical (pt-stated)   90%      Goal Statement: I want to prevent hospital visits  Date Goal set: 9/9/2020   Barriers: on chronic TPN, IV line  Strengths: home infusion, care coordination  Date to Achieve By: 12/9/21  Patient expressed understanding of goal: yes  Action steps to achieve this goal:  1. I will complete IV antibiotics as prescribed. (completed)  2. I will follow up with infectious disease provider as scheduled 9/24/20. (completed)  3. I will stay at home during COVID-19 pandemic.   4. I will monitor my temperature daily as instructed and contact [facility] for elevated temperature parameters.  5. I will have G-tube replaced as scheduled. (completed)     Intervention/Education provided during outreach: none today call with any questions or concerns.     Plan:   Care Coordinator will follow up in two months.      Dianelys KHAN, RN, PHN, CCM  Primary Clinic Care Coordination    [facility]  Primary Care Clinics  [email or web address]   Office: [phone]  Employed by [facility] [employer]

## 2021-10-25 DIAGNOSIS — R11.0 NAUSEA: ICD-10-CM

## 2021-10-26 RX ORDER — ONDANSETRON 8 MG/1
TABLET, ORALLY DISINTEGRATING ORAL
Qty: 90 TABLET | Refills: 1 | Status: SHIPPED | OUTPATIENT
Start: 2021-10-26 | End: 2022-04-21

## 2021-10-27 NOTE — PROGRESS NOTES
This is a recent snapshot of the patient's [facility] medical record.  For current drug dose and complete information and questions, call [phone] or In Basket pool, fv home infusion ([id number])  CSN Number:  [id number]

## 2021-10-28 ENCOUNTER — HOME INFUSION (PRE-WILLOW HOME INFUSION) (OUTPATIENT)
Dept: PHARMACY | Facility: CLINIC | Age: 51
End: 2021-10-28

## 2021-10-29 ENCOUNTER — HOME INFUSION (PRE-WILLOW HOME INFUSION) (OUTPATIENT)
Dept: PHARMACY | Facility: CLINIC | Age: 51
End: 2021-10-29
Payer: COMMERCIAL

## 2021-10-30 NOTE — PROGRESS NOTES
This is a recent snapshot of the patient's [facility] medical record.  For current drug dose and complete information and questions, call [phone] or In Basket pool, fv home infusion ([id number])  CSN Number:  [id number]

## 2021-11-02 ENCOUNTER — LAB REQUISITION (OUTPATIENT)
Dept: LAB | Facility: CLINIC | Age: 51
End: 2021-11-02
Payer: COMMERCIAL

## 2021-11-02 ENCOUNTER — HOME INFUSION (PRE-WILLOW HOME INFUSION) (OUTPATIENT)
Dept: PHARMACY | Facility: CLINIC | Age: 51
End: 2021-11-02

## 2021-11-02 DIAGNOSIS — R13.10 DYSPHAGIA, UNSPECIFIED: ICD-10-CM

## 2021-11-02 LAB
ALBUMIN SERPL-MCNC: 3.2 G/DL (ref 3.4–5)
ALP SERPL-CCNC: 66 U/L (ref 40–150)
ALT SERPL W P-5'-P-CCNC: 26 U/L (ref 0–70)
ANION GAP SERPL CALCULATED.3IONS-SCNC: 4 MMOL/L (ref 3–14)
AST SERPL W P-5'-P-CCNC: 18 U/L (ref 0–45)
BILIRUB DIRECT SERPL-MCNC: 0.2 MG/DL (ref 0–0.2)
BILIRUB SERPL-MCNC: 0.4 MG/DL (ref 0.2–1.3)
BUN SERPL-MCNC: 18 MG/DL (ref 7–30)
CALCIUM SERPL-MCNC: 7.6 MG/DL (ref 8.5–10.1)
CHLORIDE BLD-SCNC: 115 MMOL/L (ref 94–109)
CO2 SERPL-SCNC: 26 MMOL/L (ref 20–32)
CREAT SERPL-MCNC: 0.76 MG/DL (ref 0.66–1.25)
FASTING STATUS PATIENT QL REPORTED: NORMAL
GFR SERPL CREATININE-BSD FRML MDRD: >90 ML/MIN/1.73M2
GLUCOSE BLD-MCNC: 74 MG/DL (ref 70–99)
MAGNESIUM SERPL-MCNC: 2.4 MG/DL (ref 1.6–2.3)
PHOSPHATE SERPL-MCNC: 4 MG/DL (ref 2.5–4.5)
POTASSIUM BLD-SCNC: 4.4 MMOL/L (ref 3.4–5.3)
PROT SERPL-MCNC: 6.1 G/DL (ref 6.8–8.8)
SODIUM SERPL-SCNC: 145 MMOL/L (ref 133–144)
TRIGL SERPL-MCNC: 102 MG/DL

## 2021-11-02 PROCEDURE — 83735 ASSAY OF MAGNESIUM: CPT | Performed by: SURGERY

## 2021-11-02 PROCEDURE — 80053 COMPREHEN METABOLIC PANEL: CPT | Performed by: SURGERY

## 2021-11-02 PROCEDURE — 84134 ASSAY OF PREALBUMIN: CPT | Performed by: SURGERY

## 2021-11-02 PROCEDURE — 82248 BILIRUBIN DIRECT: CPT | Performed by: SURGERY

## 2021-11-02 PROCEDURE — 84100 ASSAY OF PHOSPHORUS: CPT | Performed by: SURGERY

## 2021-11-02 PROCEDURE — 84478 ASSAY OF TRIGLYCERIDES: CPT | Performed by: SURGERY

## 2021-11-02 PROCEDURE — 36592 COLLECT BLOOD FROM PICC: CPT | Performed by: SURGERY

## 2021-11-03 ENCOUNTER — HOME INFUSION (PRE-WILLOW HOME INFUSION) (OUTPATIENT)
Dept: PHARMACY | Facility: CLINIC | Age: 51
End: 2021-11-03
Payer: COMMERCIAL

## 2021-11-03 LAB — PREALB SERPL IA-MCNC: 31 MG/DL (ref 15–45)

## 2021-11-04 NOTE — PROGRESS NOTES
This is a recent snapshot of the patient's [facility] medical record.  For current drug dose and complete information and questions, call [phone] or In Basket pool, fv home infusion ([id number])  CSN Number:  [id number]

## 2021-11-09 ENCOUNTER — MYC REFILL (OUTPATIENT)
Dept: INTERNAL MEDICINE | Facility: CLINIC | Age: 51
End: 2021-11-09
Payer: COMMERCIAL

## 2021-11-09 DIAGNOSIS — F90.0 ADHD (ATTENTION DEFICIT HYPERACTIVITY DISORDER), INATTENTIVE TYPE: ICD-10-CM

## 2021-11-09 RX ORDER — DEXTROAMPHETAMINE SACCHARATE, AMPHETAMINE ASPARTATE, DEXTROAMPHETAMINE SULFATE AND AMPHETAMINE SULFATE 5; 5; 5; 5 MG/1; MG/1; MG/1; MG/1
TABLET ORAL
Qty: 30 TABLET | Refills: 0 | Status: SHIPPED | OUTPATIENT
Start: 2021-11-09 | End: 2021-12-09

## 2021-11-09 RX ORDER — LORAZEPAM 1 MG/1
TABLET ORAL
Qty: 30 TABLET | Refills: 0 | Status: SHIPPED | OUTPATIENT
Start: 2021-11-09 | End: 2021-12-09

## 2021-11-09 NOTE — TELEPHONE ENCOUNTER
Pending Prescriptions:                       Disp   Refills    LORazepam (ATIVAN) 1 MG tablet             30 tab*0        Sig: TAKE 1 TABLET (1MG) BY MOUTH AS NEEDED FOR ANXIETY           WITH TPN AND MEDICATIONS . JUST ONCE A DAY (30 TO           LAST 30 DAYS)    amphetamine-dextroamphetamine (ADDERALL) 2*30 tab*0        Sig: TAKE ONE TABLET BY MOUTH ONCE DAILY    Routing refill request to provider for review/approval because:  Drug not active on patient's medication list

## 2021-11-11 ENCOUNTER — HOSPITAL ENCOUNTER (OUTPATIENT)
Facility: AMBULATORY SURGERY CENTER | Age: 51
End: 2021-11-11
Attending: INTERNAL MEDICINE
Payer: COMMERCIAL

## 2021-11-11 ENCOUNTER — HOME INFUSION (PRE-WILLOW HOME INFUSION) (OUTPATIENT)
Dept: PHARMACY | Facility: CLINIC | Age: 51
End: 2021-11-11
Payer: COMMERCIAL

## 2021-11-11 ENCOUNTER — TELEPHONE (OUTPATIENT)
Dept: GASTROENTEROLOGY | Facility: CLINIC | Age: 51
End: 2021-11-11
Payer: COMMERCIAL

## 2021-11-11 NOTE — TELEPHONE ENCOUNTER
Screening Questions  1. Are you active on mychart? Y    2. What insurance is in the chart? [employer]    2.  Ordering/Referring Provider: Sean    3. BMI 27.3    4. Do you have any Lung issues?  N If yes continue:   Do you use daily home oxygen? NA   Do you have Pulmonary Hypertension? NA   Do you have SEVERE asthma? NA    5. Have you had a heart, lung, or liver transplant? N    6. Are you currently on dialysis or have chronic kidney disease? N    7. Have you had a stroke or Transient ischemic attack (TIA) within 6 months? N    8. In the past 6 months, have you had any heart related issues including cardiomyopathy or heart attack? N      If yes, did it require cardiac stenting or other implantable device?N      9. Do you have any implantable devices in your body (pacemaker, defib, LVAD)? N    10. Do you take nitroglycerin? If yes, how often? N    11. Are you currently taking any blood thinners?N    12. Are you a diabetic? N    13. (Females) Are you currently pregnant? NA  If yes, how many weeks?      15. Are you taking any prescription pain medications on a routine schedule? Yes, Oxycodone and Fentanyl   If yes, MAC sedation.    16. Do you have any chemical dependencies such as alcohol, street drugs, or methadone? N If yes, MAC sedation.    17. Do you have any history of post-traumatic stress syndrome, severe anxiety or history of psychosis? Yes-anxiety and ADHD    18. Do you transfer independently? Yes    19.  Do you have any issues with constipation? N    20. Preferred Pharmacy for Pre Prescription [facility] [place], [place] - [address] [facility]    Scheduling Details    Which Colonoscopy Prep was Sent?: Extended prep per order  Procedure Scheduled: Colon  Provider/Surgeon: Sean  Date of Procedure: 12/6/21  [place] [facility]  Caller (Please ask for phone number if not scheduled by patient): Rose      Sedation Type: MAC  Conscious Sedation- Needs  for 6 hours after the  procedure  MAC/General-Needs  for 24 hours after procedure    Pre-op Required at [facility], [place], [place] and OR for MAC sedation:   (if yes advise patient they will need a pre-op prior to procedure)      Is patient on blood thinners? -N (If yes- inform patient to follow up with PCP or provider for follow up instructions)     Informed patient they will need an adult  Yes  Cannot take any type of public or medical transportation alone    Pre-Procedure Covid test to be completed at [facility] or Externally: [facility] 12/2/21    Confirmed Nurse will call to complete assessment Yes    Additional comments:

## 2021-11-12 DIAGNOSIS — Z98.84 GASTRIC BYPASS STATUS FOR OBESITY: ICD-10-CM

## 2021-11-12 DIAGNOSIS — Z11.59 ENCOUNTER FOR SCREENING FOR OTHER VIRAL DISEASES: ICD-10-CM

## 2021-11-12 DIAGNOSIS — E55.9 VITAMIN D DEFICIENCY: ICD-10-CM

## 2021-11-12 NOTE — PROGRESS NOTES
This is a recent snapshot of the patient's [facility] medical record.  For current drug dose and complete information and questions, call [phone] or In Basket pool, fv home infusion ([id number])  CSN Number:  [id number]

## 2021-11-15 ENCOUNTER — MYC REFILL (OUTPATIENT)
Dept: PALLIATIVE MEDICINE | Facility: CLINIC | Age: 51
End: 2021-11-15
Payer: COMMERCIAL

## 2021-11-15 DIAGNOSIS — G89.4 CHRONIC PAIN SYNDROME: ICD-10-CM

## 2021-11-15 DIAGNOSIS — R10.9 CHRONIC ABDOMINAL PAIN: ICD-10-CM

## 2021-11-15 DIAGNOSIS — G89.29 CHRONIC ABDOMINAL PAIN: ICD-10-CM

## 2021-11-15 RX ORDER — ERGOCALCIFEROL 1.25 MG/1
50000 CAPSULE, LIQUID FILLED ORAL WEEKLY
Qty: 12 CAPSULE | Refills: 3 | Status: SHIPPED | OUTPATIENT
Start: 2021-11-15 | End: 2023-03-30

## 2021-11-15 RX ORDER — SUCRALFATE ORAL 1 G/10ML
SUSPENSION ORAL
Qty: 420 ML | Refills: 1 | Status: SHIPPED | OUTPATIENT
Start: 2021-11-15 | End: 2022-08-02

## 2021-11-15 NOTE — TELEPHONE ENCOUNTER
Vit D 50,000 units. Routing refill request to provider for review/approval because:  If Vit D 50,000 units then must forward to PCP for approval  CHRISTY Hurst

## 2021-11-15 NOTE — TELEPHONE ENCOUNTER
Refills have been requested for the following medications:         fentaNYL (DURAGESIC) 25 mcg/hr 72 hr patch [Patti Milligan MD]         oxyCODONE (ROXICODONE) 5 MG/5ML solution [Patti Milligan MD]     Preferred pharmacy: [address]

## 2021-11-16 ENCOUNTER — HOME INFUSION (PRE-WILLOW HOME INFUSION) (OUTPATIENT)
Dept: PHARMACY | Facility: CLINIC | Age: 51
End: 2021-11-16
Payer: COMMERCIAL

## 2021-11-16 ENCOUNTER — LAB REQUISITION (OUTPATIENT)
Dept: LAB | Facility: CLINIC | Age: 51
End: 2021-11-16
Payer: COMMERCIAL

## 2021-11-16 DIAGNOSIS — R13.10 DYSPHAGIA, UNSPECIFIED: ICD-10-CM

## 2021-11-16 LAB
ALBUMIN SERPL-MCNC: 3.8 G/DL (ref 3.4–5)
ALP SERPL-CCNC: 66 U/L (ref 40–150)
ALT SERPL W P-5'-P-CCNC: 18 U/L (ref 0–70)
ANION GAP SERPL CALCULATED.3IONS-SCNC: 6 MMOL/L (ref 3–14)
AST SERPL W P-5'-P-CCNC: 11 U/L (ref 0–45)
BASOPHILS # BLD AUTO: 0.1 10E3/UL (ref 0–0.2)
BASOPHILS NFR BLD AUTO: 1 %
BILIRUB SERPL-MCNC: 0.5 MG/DL (ref 0.2–1.3)
BUN SERPL-MCNC: 25 MG/DL (ref 7–30)
CALCIUM SERPL-MCNC: 8.6 MG/DL (ref 8.5–10.1)
CHLORIDE BLD-SCNC: 113 MMOL/L (ref 94–109)
CO2 SERPL-SCNC: 25 MMOL/L (ref 20–32)
CREAT SERPL-MCNC: 0.73 MG/DL (ref 0.66–1.25)
CRP SERPL-MCNC: <2.9 MG/L (ref 0–8)
EOSINOPHIL # BLD AUTO: 0.1 10E3/UL (ref 0–0.7)
EOSINOPHIL NFR BLD AUTO: 1 %
ERYTHROCYTE [DISTWIDTH] IN BLOOD BY AUTOMATED COUNT: 15 % (ref 10–15)
FERRITIN SERPL-MCNC: 14 NG/ML (ref 26–388)
GFR SERPL CREATININE-BSD FRML MDRD: >90 ML/MIN/1.73M2
GLUCOSE BLD-MCNC: 90 MG/DL (ref 70–99)
HCT VFR BLD AUTO: 37.1 % (ref 40–53)
HGB BLD-MCNC: 11.9 G/DL (ref 13.3–17.7)
IMM GRANULOCYTES # BLD: 0 10E3/UL
IMM GRANULOCYTES NFR BLD: 0 %
LYMPHOCYTES # BLD AUTO: 1.8 10E3/UL (ref 0.8–5.3)
LYMPHOCYTES NFR BLD AUTO: 21 %
MAGNESIUM SERPL-MCNC: 2.2 MG/DL (ref 1.6–2.3)
MCH RBC QN AUTO: 30 PG (ref 26.5–33)
MCHC RBC AUTO-ENTMCNC: 32.1 G/DL (ref 31.5–36.5)
MCV RBC AUTO: 94 FL (ref 78–100)
MONOCYTES # BLD AUTO: 0.9 10E3/UL (ref 0–1.3)
MONOCYTES NFR BLD AUTO: 11 %
NEUTROPHILS # BLD AUTO: 5.7 10E3/UL (ref 1.6–8.3)
NEUTROPHILS NFR BLD AUTO: 66 %
NRBC # BLD AUTO: 0 10E3/UL
NRBC BLD AUTO-RTO: 0 /100
PHOSPHATE SERPL-MCNC: 3.7 MG/DL (ref 2.5–4.5)
PLATELET # BLD AUTO: 188 10E3/UL (ref 150–450)
POTASSIUM BLD-SCNC: 3.7 MMOL/L (ref 3.4–5.3)
PROT SERPL-MCNC: 7.1 G/DL (ref 6.8–8.8)
RBC # BLD AUTO: 3.97 10E6/UL (ref 4.4–5.9)
SODIUM SERPL-SCNC: 144 MMOL/L (ref 133–144)
WBC # BLD AUTO: 8.5 10E3/UL (ref 4–11)

## 2021-11-16 PROCEDURE — 82728 ASSAY OF FERRITIN: CPT | Mod: GZ | Performed by: SURGERY

## 2021-11-16 PROCEDURE — 85025 COMPLETE CBC W/AUTO DIFF WBC: CPT | Mod: GZ | Performed by: SURGERY

## 2021-11-16 PROCEDURE — 83735 ASSAY OF MAGNESIUM: CPT | Performed by: SURGERY

## 2021-11-16 PROCEDURE — 84100 ASSAY OF PHOSPHORUS: CPT | Performed by: SURGERY

## 2021-11-16 PROCEDURE — 80053 COMPREHEN METABOLIC PANEL: CPT | Performed by: SURGERY

## 2021-11-16 PROCEDURE — 86140 C-REACTIVE PROTEIN: CPT | Performed by: SURGERY

## 2021-11-16 PROCEDURE — 36592 COLLECT BLOOD FROM PICC: CPT | Performed by: SURGERY

## 2021-11-16 RX ORDER — FENTANYL 25 UG/1
1 PATCH TRANSDERMAL
Qty: 15 PATCH | Refills: 0 | Status: SHIPPED | OUTPATIENT
Start: 2021-11-16 | End: 2021-12-20

## 2021-11-16 RX ORDER — OXYCODONE HCL 5 MG/5 ML
5-10 SOLUTION, ORAL ORAL 3 TIMES DAILY PRN
Qty: 900 ML | Refills: 0 | Status: SHIPPED | OUTPATIENT
Start: 2021-11-16 | End: 2021-12-20

## 2021-11-16 NOTE — TELEPHONE ENCOUNTER
Script Eprescribed to pharmacy  [place] Prescription Monitoring Program checked    Will send this to MA team to notify patient.    Signed Prescriptions:                        Disp   Refills    fentaNYL (DURAGESIC) 25 mcg/hr 72 hr patch 15 pat*0        Sig: Place 1 patch onto the skin every 48 hours remove old           patch.  May fill 12/01/21 and start 12/03/21  Authorizing Provider: BRANDYN JENKINS    oxyCODONE (ROXICODONE) 5 MG/5ML solution   900 mL 0        Sig: Take 5-10 mLs (5-10 mg) by mouth 3 times daily as           needed for pain Max of 30mg/day. Put at least 4           hours between doses. Fill 12/01/21 and start           12/03/21. 30 day supply.  Authorizing Provider: BRANDYN JENKINS MD  [facility]

## 2021-11-16 NOTE — TELEPHONE ENCOUNTER
Received call from patient requesting refill(s) of      fentaNYL (DURAGESIC) 25 mcg/hr 72 hr patch  Last dispensed from pharmacy on 11/01/21    oxyCODONE (ROXICODONE) 5 MG/5ML solution   Last dispensed from pharmacy on 11/01/21    Patient's last office/virtual visit by prescribing provider on 08/11/21  Next office/virtual appointment scheduled for 12/08/21    Last urine drug screen date 01/13/21  Current opioid agreement on file (completed within the last year) Yes Date of opioid agreement: 01/05/21    E-prescribe to   [facility] [place], [place] - [address] [place] [address]  [address] [facility] [address]  Phone: [phone] Fax: [phone]    Will route to nursing [place] for review and preparation of prescription(s).       Deidra Banks MA  [facility]

## 2021-11-16 NOTE — TELEPHONE ENCOUNTER
Medication refill information reviewed.     Due date for     fentaNYL (DURAGESIC) 25 mcg/hr 72 hr patch is 12/03/21  oxyCODONE (ROXICODONE) 5 MG/5ML solution is 12/03/21     Prescriptions prepped for review.     Will route to provider.

## 2021-11-17 ENCOUNTER — HOME INFUSION (PRE-WILLOW HOME INFUSION) (OUTPATIENT)
Dept: PHARMACY | Facility: CLINIC | Age: 51
End: 2021-11-17
Payer: COMMERCIAL

## 2021-11-17 NOTE — TELEPHONE ENCOUNTER
[employer] message sent with Rx approval from provider.  Stephon  Pain Clinic Management Team

## 2021-11-20 ENCOUNTER — HOME INFUSION (PRE-WILLOW HOME INFUSION) (OUTPATIENT)
Dept: PHARMACY | Facility: CLINIC | Age: 51
End: 2021-11-20
Payer: COMMERCIAL

## 2021-11-23 ENCOUNTER — TELEPHONE (OUTPATIENT)
Dept: INTERNAL MEDICINE | Facility: CLINIC | Age: 51
End: 2021-11-23
Payer: COMMERCIAL

## 2021-11-23 DIAGNOSIS — Z98.84 GASTRIC BYPASS STATUS FOR OBESITY: Primary | ICD-10-CM

## 2021-11-23 RX ORDER — SODIUM CHLORIDE, SODIUM LACTATE, POTASSIUM CHLORIDE, CALCIUM CHLORIDE 600; 310; 30; 20 MG/100ML; MG/100ML; MG/100ML; MG/100ML
INJECTION, SOLUTION INTRAVENOUS DAILY PRN
Start: 2021-11-23 | End: 2023-05-25

## 2021-11-24 ENCOUNTER — TELEPHONE (OUTPATIENT)
Dept: GASTROENTEROLOGY | Facility: CLINIC | Age: 51
End: 2021-11-24
Payer: COMMERCIAL

## 2021-11-24 ENCOUNTER — HOME INFUSION (PRE-WILLOW HOME INFUSION) (OUTPATIENT)
Dept: PHARMACY | Facility: CLINIC | Age: 51
End: 2021-11-24
Payer: COMMERCIAL

## 2021-11-24 DIAGNOSIS — Z12.11 SPECIAL SCREENING FOR MALIGNANT NEOPLASMS, COLON: ICD-10-CM

## 2021-11-24 RX ORDER — BISACODYL 5 MG
TABLET, DELAYED RELEASE (ENTERIC COATED) ORAL
Qty: 2 TABLET | Refills: 0 | Status: SHIPPED | OUTPATIENT
Start: 2021-11-24 | End: 2022-01-24

## 2021-11-24 NOTE — TELEPHONE ENCOUNTER
Pre assessment questions completed for upcoming colonsocopy procedure scheduled on 12/6/21 with wife Rose WALDROP test scheduled 12/2/21    Reviewed procedural arrival time 0920 and facility location [facility].    Designated  policy reviewed. Instructed to have someone stay 24 hours post procedure.     Bowel prep recommendation: Extended prep    Extended prep [place] [facility] [place] [facility] [place] [address] [place] [address] pharmacy. Prep instructions sent via [employer] .    Reviewed Extended prep instructions with patient's wife. No fiber/iron supplements or foods that contain nuts/seeds 7 days prior to procedure.     Anticoagulation/blood thinners? no    Electronic implanted devices? no    Rose verbalized understanding and had no questions or concerns at this time.    Jyothi Holloway RN

## 2021-11-26 ENCOUNTER — TELEPHONE (OUTPATIENT)
Dept: INTERNAL MEDICINE | Facility: CLINIC | Age: 51
End: 2021-11-26
Payer: COMMERCIAL

## 2021-11-26 NOTE — TELEPHONE ENCOUNTER
Reason for Call:  Form, our goal is to have forms completed with 72 hours, however, some forms may require a visit or additional information.    Type of letter, form or note:  Home Health Certification    Who is the form from?: Home care    Where did the form come from: form was faxed in    What clinic location was the form placed at?: [facility]     Where the form was placed: Given to MA/RN    What number is listed as a contact on the form?: [phone]       Additional comments:     Call taken on 11/26/2021 at 6:45 AM by Gay Mcgregor

## 2021-11-26 NOTE — TELEPHONE ENCOUNTER
Medication reconciliation completed by RN. Form and chart forwarded to PCP for signatures.    Ella Hmamond RN

## 2021-11-30 DIAGNOSIS — Z53.9 DIAGNOSIS NOT YET DEFINED: Primary | ICD-10-CM

## 2021-11-30 PROCEDURE — 83735 ASSAY OF MAGNESIUM: CPT | Performed by: SURGERY

## 2021-11-30 PROCEDURE — G0179 MD RECERTIFICATION HHA PT: HCPCS | Performed by: INTERNAL MEDICINE

## 2021-11-30 PROCEDURE — 84100 ASSAY OF PHOSPHORUS: CPT | Performed by: SURGERY

## 2021-11-30 PROCEDURE — 80053 COMPREHEN METABOLIC PANEL: CPT | Performed by: SURGERY

## 2021-11-30 PROCEDURE — 85025 COMPLETE CBC W/AUTO DIFF WBC: CPT | Performed by: SURGERY

## 2021-12-03 ENCOUNTER — HOSPITAL ENCOUNTER (OUTPATIENT)
Facility: AMBULATORY SURGERY CENTER | Age: 51
End: 2021-12-03
Attending: INTERNAL MEDICINE
Payer: COMMERCIAL

## 2021-12-03 ENCOUNTER — TELEPHONE (OUTPATIENT)
Dept: GASTROENTEROLOGY | Facility: CLINIC | Age: 51
End: 2021-12-03
Payer: COMMERCIAL

## 2021-12-03 NOTE — TELEPHONE ENCOUNTER
Caller: Rose Herrera    Procedure: Colon    Date, Location, and Surgeon of Procedure Cancelled: 12/6/2021, Sean PONCE    Ordering Provider:Sean    Reason for cancel (please be detailed, any staff messages or encounters to note?): pt needed a new date        Rescheduled: YeS     If rescheduled:    Date: 1/12/2022   Location: [facility]   Note any change or update to original order/sedation: no      Pt already has the prep and does not need anything else

## 2021-12-05 NOTE — PROGRESS NOTES
This is a recent snapshot of the patient's [facility] medical record.  For current drug dose and complete information and questions, call [phone] or In Basket pool, fv home infusion ([id number])  CSN Number:  [id number]

## 2021-12-08 ENCOUNTER — HOME INFUSION (PRE-WILLOW HOME INFUSION) (OUTPATIENT)
Dept: PHARMACY | Facility: CLINIC | Age: 51
End: 2021-12-08

## 2021-12-09 ENCOUNTER — HOME INFUSION (PRE-WILLOW HOME INFUSION) (OUTPATIENT)
Dept: PHARMACY | Facility: CLINIC | Age: 51
End: 2021-12-09
Payer: COMMERCIAL

## 2021-12-09 ENCOUNTER — MYC REFILL (OUTPATIENT)
Dept: INTERNAL MEDICINE | Facility: CLINIC | Age: 51
End: 2021-12-09
Payer: COMMERCIAL

## 2021-12-09 DIAGNOSIS — F90.0 ADHD (ATTENTION DEFICIT HYPERACTIVITY DISORDER), INATTENTIVE TYPE: ICD-10-CM

## 2021-12-10 RX ORDER — LORAZEPAM 1 MG/1
TABLET ORAL
Qty: 30 TABLET | Refills: 0 | Status: SHIPPED | OUTPATIENT
Start: 2021-12-10 | End: 2022-01-06

## 2021-12-10 RX ORDER — DEXTROAMPHETAMINE SACCHARATE, AMPHETAMINE ASPARTATE, DEXTROAMPHETAMINE SULFATE AND AMPHETAMINE SULFATE 5; 5; 5; 5 MG/1; MG/1; MG/1; MG/1
TABLET ORAL
Qty: 30 TABLET | Refills: 0 | Status: SHIPPED | OUTPATIENT
Start: 2021-12-10 | End: 2022-01-06

## 2021-12-10 NOTE — TELEPHONE ENCOUNTER
Ativan      Last Written Prescription Date:  11/9/2021  Last Fill Quantity: 30,   # refills: 0  Last Office Visit: 6/8/2021  Future Office visit:    Next 5 appointments (look out 90 days)      Jan 05, 2022 12:45 PM  Return Visit with Patti Milligan MD  [facility] [place] ([facility] ) [address]  [place] [address]  [phone]     Jan 08, 2022 10:50 AM  Pre-procedure Covid with PH COVID LAB  [facility] ([facility] ) [address] [place] [id number] [phone]             Routing refill request to provider for review/approval because:  Drug not on the [facility], [facility] or [facility] refill protocol or controlled substance    Adderall      Last Written Prescription Date:  11/9/2021  Last Fill Quantity: 30,   # refills: 0  Last Office Visit: 6/8/2021  Future Office visit:    Next 5 appointments (look out 90 days)      Jan 05, 2022 12:45 PM  Return Visit with Patti Milligan MD  [facility] [place] ([facility] [place] ) [address] [address] [facility]  [place] [address]  [phone]     Jan 08, 2022 10:50 AM  Pre-procedure Covid with PH COVID LAB  [facility] ([facility] ) [address] [place] [id number] [phone]             Routing refill request to provider for review/approval because:  Drug not on the [facility], [facility] or [facility] refill protocol or controlled substance

## 2021-12-13 DIAGNOSIS — R05.8 PRODUCTIVE COUGH: ICD-10-CM

## 2021-12-14 ENCOUNTER — LAB REQUISITION (OUTPATIENT)
Dept: LAB | Facility: CLINIC | Age: 51
End: 2021-12-14
Payer: COMMERCIAL

## 2021-12-14 DIAGNOSIS — R13.10 DYSPHAGIA, UNSPECIFIED: ICD-10-CM

## 2021-12-14 LAB
ALBUMIN SERPL-MCNC: 2.8 G/DL (ref 3.4–5)
ALP SERPL-CCNC: 64 U/L (ref 40–150)
ALT SERPL W P-5'-P-CCNC: 19 U/L (ref 0–70)
ANION GAP SERPL CALCULATED.3IONS-SCNC: 4 MMOL/L (ref 3–14)
AST SERPL W P-5'-P-CCNC: 13 U/L (ref 0–45)
BASOPHILS # BLD AUTO: 0 10E3/UL (ref 0–0.2)
BASOPHILS NFR BLD AUTO: 1 %
BILIRUB SERPL-MCNC: 0.3 MG/DL (ref 0.2–1.3)
BUN SERPL-MCNC: 16 MG/DL (ref 7–30)
CALCIUM SERPL-MCNC: 8.5 MG/DL (ref 8.5–10.1)
CHLORIDE BLD-SCNC: 113 MMOL/L (ref 94–109)
CO2 SERPL-SCNC: 28 MMOL/L (ref 20–32)
CREAT SERPL-MCNC: 0.63 MG/DL (ref 0.66–1.25)
EOSINOPHIL # BLD AUTO: 0.2 10E3/UL (ref 0–0.7)
EOSINOPHIL NFR BLD AUTO: 3 %
ERYTHROCYTE [DISTWIDTH] IN BLOOD BY AUTOMATED COUNT: 15.3 % (ref 10–15)
GFR SERPL CREATININE-BSD FRML MDRD: >90 ML/MIN/1.73M2
GLUCOSE BLD-MCNC: 105 MG/DL (ref 70–99)
HCT VFR BLD AUTO: 35.3 % (ref 40–53)
HGB BLD-MCNC: 11.4 G/DL (ref 13.3–17.7)
IMM GRANULOCYTES # BLD: 0 10E3/UL
IMM GRANULOCYTES NFR BLD: 0 %
LYMPHOCYTES # BLD AUTO: 1.2 10E3/UL (ref 0.8–5.3)
LYMPHOCYTES NFR BLD AUTO: 19 %
MAGNESIUM SERPL-MCNC: 2.2 MG/DL (ref 1.6–2.3)
MCH RBC QN AUTO: 30.7 PG (ref 26.5–33)
MCHC RBC AUTO-ENTMCNC: 32.3 G/DL (ref 31.5–36.5)
MCV RBC AUTO: 95 FL (ref 78–100)
MONOCYTES # BLD AUTO: 0.6 10E3/UL (ref 0–1.3)
MONOCYTES NFR BLD AUTO: 10 %
NEUTROPHILS # BLD AUTO: 4.3 10E3/UL (ref 1.6–8.3)
NEUTROPHILS NFR BLD AUTO: 67 %
NRBC # BLD AUTO: 0 10E3/UL
NRBC BLD AUTO-RTO: 0 /100
PHOSPHATE SERPL-MCNC: 3.5 MG/DL (ref 2.5–4.5)
PLATELET # BLD AUTO: 161 10E3/UL (ref 150–450)
POTASSIUM BLD-SCNC: 3.6 MMOL/L (ref 3.4–5.3)
PROT SERPL-MCNC: 6 G/DL (ref 6.8–8.8)
RBC # BLD AUTO: 3.71 10E6/UL (ref 4.4–5.9)
SODIUM SERPL-SCNC: 145 MMOL/L (ref 133–144)
WBC # BLD AUTO: 6.2 10E3/UL (ref 4–11)

## 2021-12-14 PROCEDURE — 36592 COLLECT BLOOD FROM PICC: CPT | Performed by: SURGERY

## 2021-12-14 PROCEDURE — 84100 ASSAY OF PHOSPHORUS: CPT | Performed by: SURGERY

## 2021-12-14 PROCEDURE — 83735 ASSAY OF MAGNESIUM: CPT | Performed by: SURGERY

## 2021-12-14 PROCEDURE — 85025 COMPLETE CBC W/AUTO DIFF WBC: CPT | Performed by: SURGERY

## 2021-12-14 PROCEDURE — 80053 COMPREHEN METABOLIC PANEL: CPT | Performed by: SURGERY

## 2021-12-15 ENCOUNTER — HOME INFUSION (PRE-WILLOW HOME INFUSION) (OUTPATIENT)
Dept: PHARMACY | Facility: CLINIC | Age: 51
End: 2021-12-15
Payer: COMMERCIAL

## 2021-12-15 RX ORDER — ALBUTEROL SULFATE 90 UG/1
AEROSOL, METERED RESPIRATORY (INHALATION)
Qty: 18 G | Refills: 0 | Status: SHIPPED | OUTPATIENT
Start: 2021-12-15 | End: 2022-04-06

## 2021-12-18 DIAGNOSIS — Z11.59 ENCOUNTER FOR SCREENING FOR OTHER VIRAL DISEASES: ICD-10-CM

## 2021-12-20 ENCOUNTER — MYC REFILL (OUTPATIENT)
Dept: PALLIATIVE MEDICINE | Facility: CLINIC | Age: 51
End: 2021-12-20
Payer: COMMERCIAL

## 2021-12-20 DIAGNOSIS — G89.29 CHRONIC ABDOMINAL PAIN: ICD-10-CM

## 2021-12-20 DIAGNOSIS — R10.9 CHRONIC ABDOMINAL PAIN: ICD-10-CM

## 2021-12-20 DIAGNOSIS — G89.4 CHRONIC PAIN SYNDROME: ICD-10-CM

## 2021-12-21 RX ORDER — FENTANYL 25 UG/1
1 PATCH TRANSDERMAL
Qty: 15 PATCH | Refills: 0 | Status: SHIPPED | OUTPATIENT
Start: 2021-12-21 | End: 2022-01-25

## 2021-12-21 RX ORDER — OXYCODONE HCL 5 MG/5 ML
5-10 SOLUTION, ORAL ORAL 3 TIMES DAILY PRN
Qty: 900 ML | Refills: 0 | Status: SHIPPED | OUTPATIENT
Start: 2021-12-21 | End: 2022-01-25

## 2021-12-21 NOTE — TELEPHONE ENCOUNTER
Received call from patient requesting refill(s) of oxyCODONE (ROXICODONE) 5 MG/5ML solution   fentaNYL (DURAGESIC) 25 mcg/hr 72 hr patch    Last dispensed from pharmacy on both on 12/1/21    Patient's last office/virtual visit by prescribing provider on  08/11/21 JCRN  Next office/virtual appointment scheduled for  01/05/22 JCRN    Last urine drug screen date 1/31/21  Current opioid agreement on file (completed within the last year) Yes Date of opioid agreement: 2/23/21    E-prescribe to [facility] pharmacy    Will route to nursing [place] for review and preparation of prescription(s).

## 2021-12-21 NOTE — TELEPHONE ENCOUNTER
Medication refill information reviewed.     Due date for oxyCODONE (ROXICODONE) 5 MG/5ML solution and fentaNYL (DURAGESIC) 25 mcg/hr 72 hr patch is 01/02/21     Prescriptions prepped for review.     Will route to provider.

## 2021-12-21 NOTE — TELEPHONE ENCOUNTER
[employer] message sent with Rx approval from provider.  Stephon  Pain Clinic Management Team

## 2021-12-21 NOTE — TELEPHONE ENCOUNTER
Refills have been requested for the following medications:         fentaNYL (DURAGESIC) 25 mcg/hr 72 hr patch [Patti Milligan MD]         oxyCODONE (ROXICODONE) 5 MG/5ML solution [Patti Milligan MD]     Preferred pharmacy: [address]

## 2021-12-21 NOTE — TELEPHONE ENCOUNTER
Script Eprescribed to pharmacy  [place] Prescription Monitoring Program checked    Will send this to MA team to notify patient.    Signed Prescriptions:                        Disp   Refills    fentaNYL (DURAGESIC) 25 mcg/hr 72 hr patch 15 pat*0        Sig: Place 1 patch onto the skin every 48 hours remove old           patch. Fill 12/31/21 and start 01/02/21.  Authorizing Provider: BRANDYN JENKINS    oxyCODONE (ROXICODONE) 5 MG/5ML solution   900 mL 0        Sig: Take 5-10 mLs (5-10 mg) by mouth 3 times daily as           needed for pain Max of 30mg/day. Put at least 4           hours between doses. Fill 12/31/21 and start           01/02/21. 30 day supply.  Authorizing Provider: BRANDYN JENKINS MD  [facility]

## 2021-12-23 ENCOUNTER — PATIENT OUTREACH (OUTPATIENT)
Dept: CARE COORDINATION | Facility: CLINIC | Age: 51
End: 2021-12-23
Payer: COMMERCIAL

## 2021-12-23 NOTE — PROGRESS NOTES
Clinic Care Coordination Contact  [facility]/Voicemail       Clinical Data: Care Coordinator Outreach  Outreach attempted x 1.  Left message on patient's voicemail with call back information and requested return call.  Plan:  Care Coordinator will try to reach patient again in 10 business days.      Dianelys KHAN, RN, PHN, [facility]  Primary Clinic Care Coordination    [facility]  Primary Care Clinics  [email or web address]   Office: [phone]  Employed by [facility]

## 2021-12-28 NOTE — PROGRESS NOTES
"Paged team per pt request for pill form of oxycodone.  Pt states he does not like the taste of the liquid form and \"will not take it again\".  " Med refill request:  metFORMIN (GLUCOPHAGE-XR) 500 MG 24 hr tablet 270 tablet 1         Last refill:7/21/21    Last Seen:7/21/21    Next Appointment:1/10/22    30 dAY

## 2021-12-29 ENCOUNTER — LAB REQUISITION (OUTPATIENT)
Dept: LAB | Facility: CLINIC | Age: 51
End: 2021-12-29
Payer: COMMERCIAL

## 2021-12-29 DIAGNOSIS — T80.211D BLOODSTREAM INFECTION DUE TO CENTRAL VENOUS CATHETER, SUBSEQUENT ENCOUNTER: ICD-10-CM

## 2021-12-29 DIAGNOSIS — R13.10 DYSPHAGIA, UNSPECIFIED: ICD-10-CM

## 2021-12-29 LAB
BASOPHILS # BLD AUTO: 0.1 10E3/UL (ref 0–0.2)
BASOPHILS NFR BLD AUTO: 1 %
BILIRUB DIRECT SERPL-MCNC: 0.2 MG/DL (ref 0–0.2)
BILIRUB SERPL-MCNC: 0.7 MG/DL (ref 0.2–1.3)
EOSINOPHIL # BLD AUTO: 0.2 10E3/UL (ref 0–0.7)
EOSINOPHIL NFR BLD AUTO: 2 %
ERYTHROCYTE [DISTWIDTH] IN BLOOD BY AUTOMATED COUNT: 15.9 % (ref 10–15)
FASTING STATUS PATIENT QL REPORTED: YES
HCT VFR BLD AUTO: 38.5 % (ref 40–53)
HGB BLD-MCNC: 12.6 G/DL (ref 13.3–17.7)
IMM GRANULOCYTES # BLD: 0.1 10E3/UL
IMM GRANULOCYTES NFR BLD: 1 %
LYMPHOCYTES # BLD AUTO: 2.2 10E3/UL (ref 0.8–5.3)
LYMPHOCYTES NFR BLD AUTO: 22 %
MAGNESIUM SERPL-MCNC: 2.5 MG/DL (ref 1.6–2.3)
MCH RBC QN AUTO: 30.7 PG (ref 26.5–33)
MCHC RBC AUTO-ENTMCNC: 32.7 G/DL (ref 31.5–36.5)
MCV RBC AUTO: 94 FL (ref 78–100)
MONOCYTES # BLD AUTO: 1 10E3/UL (ref 0–1.3)
MONOCYTES NFR BLD AUTO: 10 %
NEUTROPHILS # BLD AUTO: 6.4 10E3/UL (ref 1.6–8.3)
NEUTROPHILS NFR BLD AUTO: 64 %
NRBC # BLD AUTO: 0 10E3/UL
NRBC BLD AUTO-RTO: 0 /100
PHOSPHATE SERPL-MCNC: 4.4 MG/DL (ref 2.5–4.5)
PLATELET # BLD AUTO: 202 10E3/UL (ref 150–450)
RBC # BLD AUTO: 4.11 10E6/UL (ref 4.4–5.9)
TRIGL SERPL-MCNC: 101 MG/DL
WBC # BLD AUTO: 10 10E3/UL (ref 4–11)

## 2021-12-29 PROCEDURE — 82248 BILIRUBIN DIRECT: CPT | Performed by: SURGERY

## 2021-12-29 PROCEDURE — 85025 COMPLETE CBC W/AUTO DIFF WBC: CPT | Performed by: SURGERY

## 2021-12-29 PROCEDURE — 83735 ASSAY OF MAGNESIUM: CPT | Performed by: SURGERY

## 2021-12-29 PROCEDURE — 80053 COMPREHEN METABOLIC PANEL: CPT | Performed by: SURGERY

## 2021-12-29 PROCEDURE — 36591 DRAW BLOOD OFF VENOUS DEVICE: CPT | Performed by: SURGERY

## 2021-12-29 PROCEDURE — 84100 ASSAY OF PHOSPHORUS: CPT | Performed by: SURGERY

## 2021-12-29 PROCEDURE — 84478 ASSAY OF TRIGLYCERIDES: CPT | Mod: GZ | Performed by: SURGERY

## 2021-12-30 ENCOUNTER — HOME INFUSION (PRE-WILLOW HOME INFUSION) (OUTPATIENT)
Dept: PHARMACY | Facility: CLINIC | Age: 51
End: 2021-12-30
Payer: COMMERCIAL

## 2021-12-30 LAB
ALBUMIN SERPL-MCNC: 3 G/DL (ref 3.4–5)
ALP SERPL-CCNC: 67 U/L (ref 40–150)
ALT SERPL W P-5'-P-CCNC: 39 U/L (ref 0–70)
ANION GAP SERPL CALCULATED.3IONS-SCNC: 5 MMOL/L (ref 3–14)
AST SERPL W P-5'-P-CCNC: 20 U/L (ref 0–45)
BILIRUB SERPL-MCNC: 0.7 MG/DL (ref 0.2–1.3)
BUN SERPL-MCNC: 19 MG/DL (ref 7–30)
CALCIUM SERPL-MCNC: 8.4 MG/DL (ref 8.5–10.1)
CHLORIDE BLD-SCNC: 108 MMOL/L (ref 94–109)
CO2 SERPL-SCNC: 29 MMOL/L (ref 20–32)
CREAT SERPL-MCNC: 0.62 MG/DL (ref 0.66–1.25)
GFR SERPL CREATININE-BSD FRML MDRD: >90 ML/MIN/1.73M2
GLUCOSE BLD-MCNC: 75 MG/DL (ref 70–99)
POTASSIUM BLD-SCNC: 4.4 MMOL/L (ref 3.4–5.3)
PROT SERPL-MCNC: 6.1 G/DL (ref 6.8–8.8)
SODIUM SERPL-SCNC: 142 MMOL/L (ref 133–144)

## 2022-01-04 ENCOUNTER — LAB REQUISITION (OUTPATIENT)
Dept: LAB | Facility: CLINIC | Age: 52
End: 2022-01-04
Payer: COMMERCIAL

## 2022-01-04 DIAGNOSIS — R13.10 DYSPHAGIA, UNSPECIFIED: ICD-10-CM

## 2022-01-04 LAB
ALBUMIN SERPL-MCNC: 3 G/DL (ref 3.4–5)
ALP SERPL-CCNC: 81 U/L (ref 40–150)
ALT SERPL W P-5'-P-CCNC: 50 U/L (ref 0–70)
ANION GAP SERPL CALCULATED.3IONS-SCNC: 2 MMOL/L (ref 3–14)
AST SERPL W P-5'-P-CCNC: 21 U/L (ref 0–45)
BASOPHILS # BLD AUTO: 0 10E3/UL (ref 0–0.2)
BASOPHILS NFR BLD AUTO: 1 %
BILIRUB DIRECT SERPL-MCNC: <0.1 MG/DL (ref 0–0.2)
BILIRUB SERPL-MCNC: 0.3 MG/DL (ref 0.2–1.3)
BILIRUB SERPL-MCNC: NORMAL MG/DL
BUN SERPL-MCNC: 20 MG/DL (ref 7–30)
CALCIUM SERPL-MCNC: 8 MG/DL (ref 8.5–10.1)
CHLORIDE BLD-SCNC: 111 MMOL/L (ref 94–109)
CO2 SERPL-SCNC: 28 MMOL/L (ref 20–32)
CREAT SERPL-MCNC: 0.76 MG/DL (ref 0.66–1.25)
EOSINOPHIL # BLD AUTO: 0.2 10E3/UL (ref 0–0.7)
EOSINOPHIL NFR BLD AUTO: 3 %
ERYTHROCYTE [DISTWIDTH] IN BLOOD BY AUTOMATED COUNT: 16.3 % (ref 10–15)
FASTING STATUS PATIENT QL REPORTED: YES
GFR SERPL CREATININE-BSD FRML MDRD: >90 ML/MIN/1.73M2
GLUCOSE BLD-MCNC: 90 MG/DL (ref 70–99)
HCT VFR BLD AUTO: 34.9 % (ref 40–53)
HGB BLD-MCNC: 11 G/DL (ref 13.3–17.7)
IMM GRANULOCYTES # BLD: 0 10E3/UL
IMM GRANULOCYTES NFR BLD: 0 %
LYMPHOCYTES # BLD AUTO: 1 10E3/UL (ref 0.8–5.3)
LYMPHOCYTES NFR BLD AUTO: 13 %
MAGNESIUM SERPL-MCNC: 2.3 MG/DL (ref 1.6–2.3)
MCH RBC QN AUTO: 30.7 PG (ref 26.5–33)
MCHC RBC AUTO-ENTMCNC: 31.5 G/DL (ref 31.5–36.5)
MCV RBC AUTO: 98 FL (ref 78–100)
MONOCYTES # BLD AUTO: 1.4 10E3/UL (ref 0–1.3)
MONOCYTES NFR BLD AUTO: 17 %
NEUTROPHILS # BLD AUTO: 5.5 10E3/UL (ref 1.6–8.3)
NEUTROPHILS NFR BLD AUTO: 66 %
NRBC # BLD AUTO: 0 10E3/UL
NRBC BLD AUTO-RTO: 0 /100
PHOSPHATE SERPL-MCNC: 3.6 MG/DL (ref 2.5–4.5)
PLATELET # BLD AUTO: 156 10E3/UL (ref 150–450)
POTASSIUM BLD-SCNC: 4 MMOL/L (ref 3.4–5.3)
PROT SERPL-MCNC: 6.3 G/DL (ref 6.8–8.8)
RBC # BLD AUTO: 3.58 10E6/UL (ref 4.4–5.9)
SODIUM SERPL-SCNC: 141 MMOL/L (ref 133–144)
TRIGL SERPL-MCNC: 69 MG/DL
WBC # BLD AUTO: 8.2 10E3/UL (ref 4–11)

## 2022-01-04 PROCEDURE — 84100 ASSAY OF PHOSPHORUS: CPT | Performed by: SURGERY

## 2022-01-04 PROCEDURE — 36592 COLLECT BLOOD FROM PICC: CPT | Performed by: SURGERY

## 2022-01-04 PROCEDURE — 82248 BILIRUBIN DIRECT: CPT | Performed by: SURGERY

## 2022-01-04 PROCEDURE — 85025 COMPLETE CBC W/AUTO DIFF WBC: CPT | Performed by: SURGERY

## 2022-01-04 PROCEDURE — 83735 ASSAY OF MAGNESIUM: CPT | Performed by: SURGERY

## 2022-01-04 PROCEDURE — 84478 ASSAY OF TRIGLYCERIDES: CPT | Mod: GZ | Performed by: SURGERY

## 2022-01-04 PROCEDURE — 80053 COMPREHEN METABOLIC PANEL: CPT | Performed by: SURGERY

## 2022-01-04 NOTE — PROGRESS NOTES
This is a recent snapshot of the patient's [facility] medical record.  For current drug dose and complete information and questions, call [phone] or In Basket pool, [facility] home infusion ([id number])  CSN Number:  [id number]

## 2022-01-05 ENCOUNTER — LAB (OUTPATIENT)
Dept: LAB | Facility: CLINIC | Age: 52
End: 2022-01-05
Payer: COMMERCIAL

## 2022-01-05 ENCOUNTER — TELEPHONE (OUTPATIENT)
Dept: INFECTIOUS DISEASES | Facility: CLINIC | Age: 52
End: 2022-01-05

## 2022-01-05 ENCOUNTER — MYC MEDICAL ADVICE (OUTPATIENT)
Dept: INFECTIOUS DISEASES | Facility: CLINIC | Age: 52
End: 2022-01-05

## 2022-01-05 ENCOUNTER — HOME INFUSION (PRE-WILLOW HOME INFUSION) (OUTPATIENT)
Dept: PHARMACY | Facility: CLINIC | Age: 52
End: 2022-01-05

## 2022-01-05 DIAGNOSIS — R78.81 RECURRENT BACTEREMIA: Primary | ICD-10-CM

## 2022-01-05 DIAGNOSIS — Z79.891 ENCOUNTER FOR LONG-TERM OPIATE ANALGESIC USE: ICD-10-CM

## 2022-01-05 LAB
CANNABINOIDS UR QL SCN: NORMAL
CREAT UR-MCNC: 64 MG/DL

## 2022-01-05 PROCEDURE — 80307 DRUG TEST PRSMV CHEM ANLYZR: CPT

## 2022-01-05 PROCEDURE — 80307 DRUG TEST PRSMV CHEM ANLYZR: CPT | Mod: 59

## 2022-01-05 NOTE — TELEPHONE ENCOUNTER
Tried calling x2 regarding mychart message. Did route to Dr. Buckner but wanting to touch base, left clinic call back number in VM.

## 2022-01-05 NOTE — PROGRESS NOTES
Ordered blood cultures - let Vandana know [facility] will send someone out. Vandana expressed concern that so many different nurses have been out to the house and all change the PICC line with different techniques - worried about infection. Vandana will monitor for new/worsening symptoms and take Parker to ER if needed.

## 2022-01-06 ENCOUNTER — TELEPHONE (OUTPATIENT)
Dept: GASTROENTEROLOGY | Facility: CLINIC | Age: 52
End: 2022-01-06

## 2022-01-06 ENCOUNTER — MYC MEDICAL ADVICE (OUTPATIENT)
Dept: INTERNAL MEDICINE | Facility: CLINIC | Age: 52
End: 2022-01-06
Payer: COMMERCIAL

## 2022-01-06 ENCOUNTER — MYC REFILL (OUTPATIENT)
Dept: INTERNAL MEDICINE | Facility: CLINIC | Age: 52
End: 2022-01-06

## 2022-01-06 ENCOUNTER — LAB REQUISITION (OUTPATIENT)
Dept: LAB | Facility: CLINIC | Age: 52
DRG: 314 | End: 2022-01-06
Payer: COMMERCIAL

## 2022-01-06 DIAGNOSIS — R13.10 DYSPHAGIA, UNSPECIFIED: ICD-10-CM

## 2022-01-06 DIAGNOSIS — F90.0 ADHD (ATTENTION DEFICIT HYPERACTIVITY DISORDER), INATTENTIVE TYPE: ICD-10-CM

## 2022-01-06 DIAGNOSIS — B95.7 OTHER STAPHYLOCOCCUS AS THE CAUSE OF DISEASES CLASSIFIED ELSEWHERE: ICD-10-CM

## 2022-01-06 PROCEDURE — 87149 DNA/RNA DIRECT PROBE: CPT | Performed by: INTERNAL MEDICINE

## 2022-01-06 PROCEDURE — 87077 CULTURE AEROBIC IDENTIFY: CPT | Performed by: INTERNAL MEDICINE

## 2022-01-06 NOTE — TELEPHONE ENCOUNTER
Adderall 20 MG      Last Written Prescription Date:  12/10/2021  Last Fill Quantity: 30,   # refills: 0  Last Office Visit: 06/08/2021  Future Office visit:    Next 5 appointments (look out 90 days)    Jan 08, 2022 10:50 AM  Pre-procedure Covid with PH COVID LAB  [facility] ([facility] ) [address] [place] [id number]  [phone]           Routing refill request to provider for review/approval because:  Drug not on the [facility], [facility] or [facility] refill protocol or controlled substance      Lorazepam      Last Written Prescription Date:  12/10/2021  Last Fill Quantity: 30,   # refills: 0  Last Office Visit: 06/08/2021  Future Office visit:    Next 5 appointments (look out 90 days)    Jan 08, 2022 10:50 AM  Pre-procedure Covid with PH COVID LAB  [facility] ([facility] ) [address] [place] [id number]  [phone]           Routing refill request to provider for review/approval because:  Drug not on the [facility], [facility] or [facility] refill protocol or controlled substance

## 2022-01-06 NOTE — TELEPHONE ENCOUNTER
Procedure rescheduled.     Pre assessment questions completed for upcoming colonoscopy procedure scheduled on 1/12/22 with wife Rose    COVID test scheduled 1/8/22    Reviewed procedural arrival time 0740 and facility location [facility].    Designated  policy reviewed. Instructed to have someone stay 24 hours post procedure.     Bowel prep recommendation: Extended prep.     Per patient's wife they have bowel prep and instructions    Reviewed Extended  prep instructions with patient's wife. No fiber/iron supplements or foods that contain nuts/seeds 7 days prior to procedure.     Patient has been running a fever. Procedure may need to be rescheduled. Wife is aware that Covid test is needed and fever free for 24 hours prior to procedure.     Anticoagulation/blood thinners? no    Electronic implanted devices? no    Patient's wife verbalized understanding and had no questions or concerns at this time.    Jyothi Holloway RN

## 2022-01-06 NOTE — TELEPHONE ENCOUNTER
Called Rose, ok with orders as is and reiterated if fevers persist, which they seem to be, and/or symptoms worsen report to ER, verbalized understanding.

## 2022-01-07 ENCOUNTER — TELEPHONE (OUTPATIENT)
Dept: INTERNAL MEDICINE | Facility: CLINIC | Age: 52
End: 2022-01-07

## 2022-01-07 ENCOUNTER — HOME INFUSION (PRE-WILLOW HOME INFUSION) (OUTPATIENT)
Dept: PHARMACY | Facility: CLINIC | Age: 52
End: 2022-01-07
Payer: COMMERCIAL

## 2022-01-07 ENCOUNTER — TELEPHONE (OUTPATIENT)
Dept: INTERNAL MEDICINE | Facility: CLINIC | Age: 52
End: 2022-01-07
Payer: COMMERCIAL

## 2022-01-07 ENCOUNTER — TELEPHONE (OUTPATIENT)
Dept: INFECTIOUS DISEASES | Facility: CLINIC | Age: 52
End: 2022-01-07
Payer: COMMERCIAL

## 2022-01-07 RX ORDER — DEXTROAMPHETAMINE SACCHARATE, AMPHETAMINE ASPARTATE, DEXTROAMPHETAMINE SULFATE AND AMPHETAMINE SULFATE 5; 5; 5; 5 MG/1; MG/1; MG/1; MG/1
TABLET ORAL
Qty: 30 TABLET | Refills: 0 | Status: SHIPPED | OUTPATIENT
Start: 2022-01-07 | End: 2022-02-03

## 2022-01-07 RX ORDER — LORAZEPAM 1 MG/1
TABLET ORAL
Qty: 30 TABLET | Refills: 0 | Status: SHIPPED | OUTPATIENT
Start: 2022-01-07 | End: 2022-02-03

## 2022-01-07 NOTE — TELEPHONE ENCOUNTER
Central Prior Authorization Team   Phone: [phone]      [facility] Prior Authorization Request:

## 2022-01-07 NOTE — TELEPHONE ENCOUNTER
1/7/2022   Infectious Diseases Telephone Note:     I called Rose today because Parker's culture from his Cm is positive for S epi. Peripheral culture is negative so far.     Parker is still having fevers but otherwise stable. Parker and Rose would strongly prefer not to come to the ED if possible given the long wait times. 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 Parker and Rose know that standard of care for a positive blood culture is hospital admission, but given Parker's extensive history with line infections and both of their sophistication managing antibiotics at home, we made the following plan:     1. \[facility]\"" kindly agreed to attempt get a nurse to the [facility]'s today for repeat blood cultures from both Cm lumens and peripheral. We will also get a repeat CBC with differential (normal on 1/4 just before fevers started).   2. We will restart vancomycin at home which he has been on many times before while awaiting repeat blood cultures. First dose to be after blood cultures.   3. He is already scheduled for a COVID test tomorrow (1/8)   4. If further cultures are positive for S epidermidis, he will need to come to the ED for further evaluation and line management.   5. If he develops any symptoms other than fevers, such as somnolence or confusion, he needs to come to the ED immediately.    I will also ask for Parker to be scheduled for virtual follow-up with me in clinic.     Марина Buckner MD  Division of Infectious Diseases and International Medicine  [facility]: [phone]

## 2022-01-07 NOTE — TELEPHONE ENCOUNTER
Pt peripheral culture +, updated Dr. Buckner, [facility] and Rose. Rose acknowledged she will take Parker to ER for evaluation.

## 2022-01-07 NOTE — TELEPHONE ENCOUNTER
Central Prior Authorization Team   Phone: [phone]      [facility] Prior Authorization Request:

## 2022-01-08 ENCOUNTER — HOSPITAL ENCOUNTER (INPATIENT)
Facility: CLINIC | Age: 52
LOS: 5 days | Discharge: HOME HEALTH | DRG: 314 | End: 2022-01-13
Attending: EMERGENCY MEDICINE | Admitting: INTERNAL MEDICINE
Payer: COMMERCIAL

## 2022-01-08 ENCOUNTER — APPOINTMENT (OUTPATIENT)
Dept: GENERAL RADIOLOGY | Facility: CLINIC | Age: 52
DRG: 314 | End: 2022-01-08
Attending: EMERGENCY MEDICINE
Payer: COMMERCIAL

## 2022-01-08 DIAGNOSIS — R78.81 GRAM-POSITIVE BACTEREMIA: ICD-10-CM

## 2022-01-08 DIAGNOSIS — T80.211A BLOODSTREAM INFECTION ASSOCIATED WITH CENTRAL VENOUS CATHETER, INITIAL ENCOUNTER: ICD-10-CM

## 2022-01-08 DIAGNOSIS — R10.9 CHRONIC ABDOMINAL PAIN: Primary | ICD-10-CM

## 2022-01-08 DIAGNOSIS — K90.829 SHORT BOWEL SYNDROME: ICD-10-CM

## 2022-01-08 DIAGNOSIS — G47.00 INSOMNIA, UNSPECIFIED TYPE: ICD-10-CM

## 2022-01-08 DIAGNOSIS — Z11.52 ENCOUNTER FOR SCREENING LABORATORY TESTING FOR SEVERE ACUTE RESPIRATORY SYNDROME CORONAVIRUS 2 (SARS-COV-2): ICD-10-CM

## 2022-01-08 DIAGNOSIS — Z78.9 ON TOTAL PARENTERAL NUTRITION: ICD-10-CM

## 2022-01-08 DIAGNOSIS — G89.29 CHRONIC ABDOMINAL PAIN: Primary | ICD-10-CM

## 2022-01-08 DIAGNOSIS — B96.89 OTHER SPECIFIED BACTERIAL AGENTS AS THE CAUSE OF DISEASES CLASSIFIED ELSEWHERE: ICD-10-CM

## 2022-01-08 LAB
ALBUMIN SERPL-MCNC: 2.7 G/DL (ref 3.4–5)
ALBUMIN SERPL-MCNC: 3.2 G/DL (ref 3.4–5)
ALP SERPL-CCNC: 68 U/L (ref 40–150)
ALP SERPL-CCNC: 76 U/L (ref 40–150)
ALT SERPL W P-5'-P-CCNC: 31 U/L (ref 0–70)
ALT SERPL W P-5'-P-CCNC: 37 U/L (ref 0–70)
ANION GAP SERPL CALCULATED.3IONS-SCNC: 4 MMOL/L (ref 3–14)
ANION GAP SERPL CALCULATED.3IONS-SCNC: 6 MMOL/L (ref 3–14)
AST SERPL W P-5'-P-CCNC: 16 U/L (ref 0–45)
AST SERPL W P-5'-P-CCNC: 21 U/L (ref 0–45)
BASOPHILS # BLD AUTO: 0 10E3/UL (ref 0–0.2)
BASOPHILS # BLD AUTO: 0 10E3/UL (ref 0–0.2)
BASOPHILS NFR BLD AUTO: 0 %
BASOPHILS NFR BLD AUTO: 0 %
BILIRUB SERPL-MCNC: 0.5 MG/DL (ref 0.2–1.3)
BILIRUB SERPL-MCNC: 0.6 MG/DL (ref 0.2–1.3)
BUN SERPL-MCNC: 10 MG/DL (ref 7–30)
BUN SERPL-MCNC: 9 MG/DL (ref 7–30)
C PNEUM DNA SPEC QL NAA+PROBE: NOT DETECTED
CALCIUM SERPL-MCNC: 8.2 MG/DL (ref 8.5–10.1)
CALCIUM SERPL-MCNC: 8.6 MG/DL (ref 8.5–10.1)
CHLORIDE BLD-SCNC: 106 MMOL/L (ref 94–109)
CHLORIDE BLD-SCNC: 108 MMOL/L (ref 94–109)
CO2 SERPL-SCNC: 27 MMOL/L (ref 20–32)
CO2 SERPL-SCNC: 29 MMOL/L (ref 20–32)
CREAT SERPL-MCNC: 0.69 MG/DL (ref 0.66–1.25)
CREAT SERPL-MCNC: 0.69 MG/DL (ref 0.66–1.25)
CRP SERPL-MCNC: 52 MG/L (ref 0–8)
EOSINOPHIL # BLD AUTO: 0 10E3/UL (ref 0–0.7)
EOSINOPHIL # BLD AUTO: 0.1 10E3/UL (ref 0–0.7)
EOSINOPHIL NFR BLD AUTO: 0 %
EOSINOPHIL NFR BLD AUTO: 2 %
ERYTHROCYTE [DISTWIDTH] IN BLOOD BY AUTOMATED COUNT: 15.8 % (ref 10–15)
ERYTHROCYTE [DISTWIDTH] IN BLOOD BY AUTOMATED COUNT: 15.8 % (ref 10–15)
FLUAV H1 2009 PAND RNA SPEC QL NAA+PROBE: NOT DETECTED
FLUAV H1 RNA SPEC QL NAA+PROBE: NOT DETECTED
FLUAV H3 RNA SPEC QL NAA+PROBE: NOT DETECTED
FLUAV RNA SPEC QL NAA+PROBE: NOT DETECTED
FLUBV RNA SPEC QL NAA+PROBE: NOT DETECTED
GFR SERPL CREATININE-BSD FRML MDRD: >90 ML/MIN/1.73M2
GFR SERPL CREATININE-BSD FRML MDRD: >90 ML/MIN/1.73M2
GLUCOSE BLD-MCNC: 115 MG/DL (ref 70–99)
GLUCOSE BLD-MCNC: 96 MG/DL (ref 70–99)
HADV DNA SPEC QL NAA+PROBE: NOT DETECTED
HCOV PNL SPEC NAA+PROBE: NOT DETECTED
HCT VFR BLD AUTO: 32.2 % (ref 40–53)
HCT VFR BLD AUTO: 36 % (ref 40–53)
HGB BLD-MCNC: 10.5 G/DL (ref 13.3–17.7)
HGB BLD-MCNC: 11.3 G/DL (ref 13.3–17.7)
HMPV RNA SPEC QL NAA+PROBE: NOT DETECTED
HPIV1 RNA SPEC QL NAA+PROBE: NOT DETECTED
HPIV2 RNA SPEC QL NAA+PROBE: NOT DETECTED
HPIV3 RNA SPEC QL NAA+PROBE: NOT DETECTED
HPIV4 RNA SPEC QL NAA+PROBE: NOT DETECTED
IMM GRANULOCYTES # BLD: 0 10E3/UL
IMM GRANULOCYTES # BLD: 0.1 10E3/UL
IMM GRANULOCYTES NFR BLD: 0 %
IMM GRANULOCYTES NFR BLD: 0 %
INR PPP: 1.07 (ref 0.85–1.15)
LACTATE SERPL-SCNC: 0.9 MMOL/L (ref 0.7–2)
LYMPHOCYTES # BLD AUTO: 0.3 10E3/UL (ref 0.8–5.3)
LYMPHOCYTES # BLD AUTO: 0.4 10E3/UL (ref 0.8–5.3)
LYMPHOCYTES NFR BLD AUTO: 3 %
LYMPHOCYTES NFR BLD AUTO: 8 %
M PNEUMO DNA SPEC QL NAA+PROBE: NOT DETECTED
MCH RBC QN AUTO: 30.5 PG (ref 26.5–33)
MCH RBC QN AUTO: 30.5 PG (ref 26.5–33)
MCHC RBC AUTO-ENTMCNC: 31.4 G/DL (ref 31.5–36.5)
MCHC RBC AUTO-ENTMCNC: 32.6 G/DL (ref 31.5–36.5)
MCV RBC AUTO: 94 FL (ref 78–100)
MCV RBC AUTO: 97 FL (ref 78–100)
MONOCYTES # BLD AUTO: 0.1 10E3/UL (ref 0–1.3)
MONOCYTES # BLD AUTO: 1.5 10E3/UL (ref 0–1.3)
MONOCYTES NFR BLD AUTO: 13 %
MONOCYTES NFR BLD AUTO: 2 %
NEUTROPHILS # BLD AUTO: 4 10E3/UL (ref 1.6–8.3)
NEUTROPHILS # BLD AUTO: 9.6 10E3/UL (ref 1.6–8.3)
NEUTROPHILS NFR BLD AUTO: 84 %
NEUTROPHILS NFR BLD AUTO: 88 %
NRBC # BLD AUTO: 0 10E3/UL
NRBC # BLD AUTO: 0 10E3/UL
NRBC BLD AUTO-RTO: 0 /100
NRBC BLD AUTO-RTO: 0 /100
PLATELET # BLD AUTO: 127 10E3/UL (ref 150–450)
PLATELET # BLD AUTO: 145 10E3/UL (ref 150–450)
POTASSIUM BLD-SCNC: 3 MMOL/L (ref 3.4–5.3)
POTASSIUM BLD-SCNC: 3.9 MMOL/L (ref 3.4–5.3)
PROT SERPL-MCNC: 6.1 G/DL (ref 6.8–8.8)
PROT SERPL-MCNC: 7 G/DL (ref 6.8–8.8)
RBC # BLD AUTO: 3.44 10E6/UL (ref 4.4–5.9)
RBC # BLD AUTO: 3.71 10E6/UL (ref 4.4–5.9)
RSV RNA SPEC QL NAA+PROBE: NOT DETECTED
RSV RNA SPEC QL NAA+PROBE: NOT DETECTED
RV+EV RNA SPEC QL NAA+PROBE: NOT DETECTED
SARS-COV-2 RNA RESP QL NAA+PROBE: NEGATIVE
SODIUM SERPL-SCNC: 139 MMOL/L (ref 133–144)
SODIUM SERPL-SCNC: 141 MMOL/L (ref 133–144)
WBC # BLD AUTO: 11.6 10E3/UL (ref 4–11)
WBC # BLD AUTO: 4.6 10E3/UL (ref 4–11)

## 2022-01-08 PROCEDURE — 80053 COMPREHEN METABOLIC PANEL: CPT | Performed by: EMERGENCY MEDICINE

## 2022-01-08 PROCEDURE — 71046 X-RAY EXAM CHEST 2 VIEWS: CPT

## 2022-01-08 PROCEDURE — 80307 DRUG TEST PRSMV CHEM ANLYZR: CPT | Performed by: INTERNAL MEDICINE

## 2022-01-08 PROCEDURE — 250N000013 HC RX MED GY IP 250 OP 250 PS 637: Performed by: STUDENT IN AN ORGANIZED HEALTH CARE EDUCATION/TRAINING PROGRAM

## 2022-01-08 PROCEDURE — 87633 RESP VIRUS 12-25 TARGETS: CPT | Performed by: EMERGENCY MEDICINE

## 2022-01-08 PROCEDURE — 250N000013 HC RX MED GY IP 250 OP 250 PS 637: Performed by: EMERGENCY MEDICINE

## 2022-01-08 PROCEDURE — 96374 THER/PROPH/DIAG INJ IV PUSH: CPT | Performed by: EMERGENCY MEDICINE

## 2022-01-08 PROCEDURE — 84450 TRANSFERASE (AST) (SGOT): CPT | Performed by: INTERNAL MEDICINE

## 2022-01-08 PROCEDURE — 250N000011 HC RX IP 250 OP 636: Performed by: INTERNAL MEDICINE

## 2022-01-08 PROCEDURE — 3E0436Z INTRODUCTION OF NUTRITIONAL SUBSTANCE INTO CENTRAL VEIN, PERCUTANEOUS APPROACH: ICD-10-PCS | Performed by: INTERNAL MEDICINE

## 2022-01-08 PROCEDURE — 86140 C-REACTIVE PROTEIN: CPT | Performed by: EMERGENCY MEDICINE

## 2022-01-08 PROCEDURE — 999N000127 HC STATISTIC PERIPHERAL IV START W US GUIDANCE

## 2022-01-08 PROCEDURE — 250N000013 HC RX MED GY IP 250 OP 250 PS 637: Performed by: INTERNAL MEDICINE

## 2022-01-08 PROCEDURE — C9803 HOPD COVID-19 SPEC COLLECT: HCPCS | Performed by: EMERGENCY MEDICINE

## 2022-01-08 PROCEDURE — 85025 COMPLETE CBC W/AUTO DIFF WBC: CPT | Performed by: EMERGENCY MEDICINE

## 2022-01-08 PROCEDURE — U0003 INFECTIOUS AGENT DETECTION BY NUCLEIC ACID (DNA OR RNA); SEVERE ACUTE RESPIRATORY SYNDROME CORONAVIRUS 2 (SARS-COV-2) (CORONAVIRUS DISEASE [COVID-19]), AMPLIFIED PROBE TECHNIQUE, MAKING USE OF HIGH THROUGHPUT TECHNOLOGIES AS DESCRIBED BY CMS-2020-01-R: HCPCS | Performed by: EMERGENCY MEDICINE

## 2022-01-08 PROCEDURE — 96361 HYDRATE IV INFUSION ADD-ON: CPT | Performed by: EMERGENCY MEDICINE

## 2022-01-08 PROCEDURE — 83605 ASSAY OF LACTIC ACID: CPT | Performed by: INTERNAL MEDICINE

## 2022-01-08 PROCEDURE — 85610 PROTHROMBIN TIME: CPT | Performed by: EMERGENCY MEDICINE

## 2022-01-08 PROCEDURE — 99223 1ST HOSP IP/OBS HIGH 75: CPT | Mod: AI | Performed by: INTERNAL MEDICINE

## 2022-01-08 PROCEDURE — 71046 X-RAY EXAM CHEST 2 VIEWS: CPT | Mod: 26 | Performed by: RADIOLOGY

## 2022-01-08 PROCEDURE — 85025 COMPLETE CBC W/AUTO DIFF WBC: CPT | Performed by: INTERNAL MEDICINE

## 2022-01-08 PROCEDURE — 99285 EMERGENCY DEPT VISIT HI MDM: CPT | Mod: 25 | Performed by: EMERGENCY MEDICINE

## 2022-01-08 PROCEDURE — 36415 COLL VENOUS BLD VENIPUNCTURE: CPT | Performed by: INTERNAL MEDICINE

## 2022-01-08 PROCEDURE — 99285 EMERGENCY DEPT VISIT HI MDM: CPT | Performed by: EMERGENCY MEDICINE

## 2022-01-08 PROCEDURE — 82040 ASSAY OF SERUM ALBUMIN: CPT | Performed by: EMERGENCY MEDICINE

## 2022-01-08 PROCEDURE — 258N000003 HC RX IP 258 OP 636: Performed by: EMERGENCY MEDICINE

## 2022-01-08 PROCEDURE — 250N000009 HC RX 250: Performed by: INTERNAL MEDICINE

## 2022-01-08 PROCEDURE — 87077 CULTURE AEROBIC IDENTIFY: CPT | Performed by: EMERGENCY MEDICINE

## 2022-01-08 PROCEDURE — 36415 COLL VENOUS BLD VENIPUNCTURE: CPT | Performed by: EMERGENCY MEDICINE

## 2022-01-08 PROCEDURE — 120N000002 HC R&B MED SURG/OB UMMC

## 2022-01-08 PROCEDURE — 250N000011 HC RX IP 250 OP 636: Performed by: EMERGENCY MEDICINE

## 2022-01-08 PROCEDURE — 96375 TX/PRO/DX INJ NEW DRUG ADDON: CPT | Performed by: EMERGENCY MEDICINE

## 2022-01-08 PROCEDURE — 84155 ASSAY OF PROTEIN SERUM: CPT | Performed by: INTERNAL MEDICINE

## 2022-01-08 RX ORDER — NALOXONE HYDROCHLORIDE 0.4 MG/ML
0.2 INJECTION, SOLUTION INTRAMUSCULAR; INTRAVENOUS; SUBCUTANEOUS
Status: DISCONTINUED | OUTPATIENT
Start: 2022-01-08 | End: 2022-01-13 | Stop reason: HOSPADM

## 2022-01-08 RX ORDER — POLYETHYLENE GLYCOL 3350 17 G/17G
17 POWDER, FOR SOLUTION ORAL DAILY PRN
Status: DISCONTINUED | OUTPATIENT
Start: 2022-01-08 | End: 2022-01-13 | Stop reason: HOSPADM

## 2022-01-08 RX ORDER — OXYCODONE HCL 5 MG/5 ML
5-10 SOLUTION, ORAL ORAL 3 TIMES DAILY PRN
Status: DISCONTINUED | OUTPATIENT
Start: 2022-01-08 | End: 2022-01-09

## 2022-01-08 RX ORDER — CARVEDILOL 6.25 MG/1
6.25 TABLET ORAL 2 TIMES DAILY WITH MEALS
Status: DISCONTINUED | OUTPATIENT
Start: 2022-01-08 | End: 2022-01-13 | Stop reason: HOSPADM

## 2022-01-08 RX ORDER — PANTOPRAZOLE SODIUM 40 MG/1
40 TABLET, DELAYED RELEASE ORAL DAILY
Status: DISCONTINUED | OUTPATIENT
Start: 2022-01-09 | End: 2022-01-13 | Stop reason: HOSPADM

## 2022-01-08 RX ORDER — DEXTROAMPHETAMINE SACCHARATE, AMPHETAMINE ASPARTATE, DEXTROAMPHETAMINE SULFATE AND AMPHETAMINE SULFATE 2.5; 2.5; 2.5; 2.5 MG/1; MG/1; MG/1; MG/1
20 TABLET ORAL DAILY
Status: DISCONTINUED | OUTPATIENT
Start: 2022-01-09 | End: 2022-01-13 | Stop reason: HOSPADM

## 2022-01-08 RX ORDER — AMOXICILLIN 250 MG
1 CAPSULE ORAL 2 TIMES DAILY PRN
Status: DISCONTINUED | OUTPATIENT
Start: 2022-01-08 | End: 2022-01-13 | Stop reason: HOSPADM

## 2022-01-08 RX ORDER — ONDANSETRON 2 MG/ML
4 INJECTION INTRAMUSCULAR; INTRAVENOUS ONCE
Status: COMPLETED | OUTPATIENT
Start: 2022-01-08 | End: 2022-01-08

## 2022-01-08 RX ORDER — SUCRALFATE ORAL 1 G/10ML
1 SUSPENSION ORAL
Status: DISCONTINUED | OUTPATIENT
Start: 2022-01-08 | End: 2022-01-13 | Stop reason: HOSPADM

## 2022-01-08 RX ORDER — CEFAZOLIN SODIUM 2 G/100ML
2 INJECTION, SOLUTION INTRAVENOUS ONCE
Status: COMPLETED | OUTPATIENT
Start: 2022-01-08 | End: 2022-01-08

## 2022-01-08 RX ORDER — DEXTROSE MONOHYDRATE 100 MG/ML
INJECTION, SOLUTION INTRAVENOUS CONTINUOUS PRN
Status: DISCONTINUED | OUTPATIENT
Start: 2022-01-08 | End: 2022-01-13 | Stop reason: HOSPADM

## 2022-01-08 RX ORDER — AMOXICILLIN 250 MG
2 CAPSULE ORAL 2 TIMES DAILY PRN
Status: DISCONTINUED | OUTPATIENT
Start: 2022-01-08 | End: 2022-01-13 | Stop reason: HOSPADM

## 2022-01-08 RX ORDER — NALOXONE HYDROCHLORIDE 0.4 MG/ML
0.4 INJECTION, SOLUTION INTRAMUSCULAR; INTRAVENOUS; SUBCUTANEOUS
Status: DISCONTINUED | OUTPATIENT
Start: 2022-01-08 | End: 2022-01-13 | Stop reason: HOSPADM

## 2022-01-08 RX ORDER — ACETAMINOPHEN 325 MG/10.15ML
1000 LIQUID ORAL ONCE
Status: COMPLETED | OUTPATIENT
Start: 2022-01-08 | End: 2022-01-08

## 2022-01-08 RX ORDER — ALBUTEROL SULFATE 90 UG/1
2 AEROSOL, METERED RESPIRATORY (INHALATION) EVERY 4 HOURS PRN
Status: DISCONTINUED | OUTPATIENT
Start: 2022-01-08 | End: 2022-01-13 | Stop reason: HOSPADM

## 2022-01-08 RX ORDER — LORAZEPAM 1 MG/1
1 TABLET ORAL DAILY PRN
Status: DISCONTINUED | OUTPATIENT
Start: 2022-01-08 | End: 2022-01-13 | Stop reason: HOSPADM

## 2022-01-08 RX ORDER — ALBUTEROL SULFATE 90 UG/1
2 AEROSOL, METERED RESPIRATORY (INHALATION) EVERY 6 HOURS PRN
Status: DISCONTINUED | OUTPATIENT
Start: 2022-01-08 | End: 2022-01-09

## 2022-01-08 RX ORDER — FENTANYL 25 UG/1
25 PATCH TRANSDERMAL
Status: DISCONTINUED | OUTPATIENT
Start: 2022-01-08 | End: 2022-01-13 | Stop reason: HOSPADM

## 2022-01-08 RX ORDER — ONDANSETRON 4 MG/1
8 TABLET, ORALLY DISINTEGRATING ORAL EVERY 8 HOURS PRN
Status: DISCONTINUED | OUTPATIENT
Start: 2022-01-08 | End: 2022-01-09

## 2022-01-08 RX ORDER — CARBOXYMETHYLCELLULOSE SODIUM 5 MG/ML
1 SOLUTION/ DROPS OPHTHALMIC
Status: DISCONTINUED | OUTPATIENT
Start: 2022-01-08 | End: 2022-01-13 | Stop reason: HOSPADM

## 2022-01-08 RX ORDER — LIDOCAINE 40 MG/G
CREAM TOPICAL
Status: DISCONTINUED | OUTPATIENT
Start: 2022-01-08 | End: 2022-01-13 | Stop reason: HOSPADM

## 2022-01-08 RX ORDER — NYSTATIN 100000 U/G
CREAM TOPICAL 2 TIMES DAILY
Status: DISCONTINUED | OUTPATIENT
Start: 2022-01-08 | End: 2022-01-13 | Stop reason: HOSPADM

## 2022-01-08 RX ORDER — CEFAZOLIN SODIUM 2 G/100ML
2 INJECTION, SOLUTION INTRAVENOUS EVERY 8 HOURS
Status: DISCONTINUED | OUTPATIENT
Start: 2022-01-08 | End: 2022-01-13 | Stop reason: HOSPADM

## 2022-01-08 RX ADMIN — Medication 1 DROP: at 22:19

## 2022-01-08 RX ADMIN — SODIUM CHLORIDE, POTASSIUM CHLORIDE, SODIUM LACTATE AND CALCIUM CHLORIDE 1000 ML: 600; 310; 30; 20 INJECTION, SOLUTION INTRAVENOUS at 17:57

## 2022-01-08 RX ADMIN — LORAZEPAM 1 MG: 1 TABLET ORAL at 22:19

## 2022-01-08 RX ADMIN — CEFAZOLIN SODIUM 2 G: 2 INJECTION, SOLUTION INTRAVENOUS at 23:07

## 2022-01-08 RX ADMIN — ONDANSETRON 4 MG: 2 INJECTION INTRAMUSCULAR; INTRAVENOUS at 17:20

## 2022-01-08 RX ADMIN — Medication: at 20:37

## 2022-01-08 RX ADMIN — CEFAZOLIN SODIUM 2 G: 2 INJECTION, SOLUTION INTRAVENOUS at 15:51

## 2022-01-08 RX ADMIN — CARVEDILOL 6.25 MG: 3.12 TABLET, FILM COATED ORAL at 20:51

## 2022-01-08 RX ADMIN — SUCRALFATE 1 G: 1 SUSPENSION ORAL at 20:38

## 2022-01-08 RX ADMIN — OXYCODONE HYDROCHLORIDE 5 MG: 5 SOLUTION ORAL at 20:51

## 2022-01-08 RX ADMIN — ACETAMINOPHEN 1000 MG: 325 SOLUTION ORAL at 17:20

## 2022-01-08 RX ADMIN — OXYCODONE HYDROCHLORIDE 5 MG: 5 SOLUTION ORAL at 20:59

## 2022-01-08 ASSESSMENT — ACTIVITIES OF DAILY LIVING (ADL)
ADLS_ACUITY_SCORE: 9
DIFFICULTY_COMMUNICATING: NO
FALL_HISTORY_WITHIN_LAST_SIX_MONTHS: NO
WERE_AUXILIARY_AIDS_OFFERED?: NO
ADLS_ACUITY_SCORE: 9
WEAR_GLASSES_OR_BLIND: NO
DRESSING/BATHING_DIFFICULTY: NO
ADLS_ACUITY_SCORE: 9
ADLS_ACUITY_SCORE: 9
DESCRIBE_HEARING_LOSS: RINGING IN EARS
ADLS_ACUITY_SCORE: 9
ADLS_ACUITY_SCORE: 9
WALKING_OR_CLIMBING_STAIRS_DIFFICULTY: NO
ADLS_ACUITY_SCORE: 9
PATIENT'S_PREFERRED_MEANS_OF_COMMUNICATION: VERBAL
THE_FOLLOWING_AIDS_WERE_PROVIDED;: PATIENT DECLINED OFFER OF AUXILIARY AIDS
HEARING_DIFFICULTY_OR_DEAF: YES
ADLS_ACUITY_SCORE: 9
DOING_ERRANDS_INDEPENDENTLY_DIFFICULTY: NO
EATING/SWALLOWING: EATING;SWALLOWING SOLID FOOD
ADLS_ACUITY_SCORE: 9
CONCENTRATING,_REMEMBERING_OR_MAKING_DECISIONS_DIFFICULTY: NO
DIFFICULTY_EATING/SWALLOWING: YES
TOILETING_ISSUES: NO

## 2022-01-08 ASSESSMENT — ENCOUNTER SYMPTOMS
FATIGUE: 1
ABDOMINAL PAIN: 0
WEAKNESS: 1
SHORTNESS OF BREATH: 0

## 2022-01-08 ASSESSMENT — MIFFLIN-ST. JEOR
SCORE: 1792.96
SCORE: 1801.12

## 2022-01-08 NOTE — ED PROVIDER NOTES
[facility] EMERGENCY DEPARTMENT ([facility])  1/08/22  History     Chief Complaint   Patient presents with     Blood Infection     The history is provided by the patient and medical records.     Parker Acevedo is a 51-year-old male presents to the ER due to a positive blood culture that was drawn 2 days prior.  Patient says that he was called yesterday and told to come to the hospital due to a positive blood culture.  Patient says that he has been feeling tired, weak, and unwell.  Says that he has had a T-max of 103.3 at home and his temperature has been about 99 when he is not febrile.  Patient denies any abdominal pain, chest pain, or shortness of breath.  He states he has been feeling very weak.  Says that he gets TPN from a Cm catheter that is in his right chest.  He is a G port that he has uses for suctioning.  Patient says he takes minimal in orally.  He gives himself about 1 to 2 L of IV fluids through his Cm daily.  Patient denies any new belly pain.  States he is vaccinated from COVID-19 at states that he has had Covid in the past.  Patient lives at home with his wife and family members.    ---  This part of the medical record was transcribed by Asad Oliva, Medical Scribe, from a dictation done by Justina Lockett MD.       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     Complication of anesthesia      Difficulty swallowing      Gastric ulcer, unspecified as acute or chronic, without mention of hemorrhage, perforation, or obstruction      Gastro-oesophageal reflux disease      Head injury      Hiatal hernia      Other bladder disorder      Other chronic pain      PONV (postoperative nausea and  vomiting)      Severe malnutrition (H)     TPN     Short gut syndrome      Tobacco abuse      Past Surgical History:   Procedure Laterality Date     AMPUTATION       APPENDECTOMY       BACK SURGERY  11/3/2014    curve in the spine     BIOPSY LYMPH NODE CERVICAL N/A 2/20/2015    Procedure: BIOPSY LYMPH NODE CERVICAL;  Surgeon: Baron Scanlon MD;  Location: [facility]     CHOLECYSTECTOMY       COLONOSCOPY N/A 7/14/2021    Procedure: COLONOSCOPY;  Surgeon: Jimbo Estrada MD;  Location: [facility] OR     DISCECTOMY, FUSION CERVICAL ANTERIOR ONE LEVEL, COMBINED N/A 2/15/2017    Procedure: COMBINED DISCECTOMY, FUSION CERVICAL ANTERIOR ONE LEVEL;  Surgeon: Darren Campos MD;  Location: [facility]     ENDOSCOPIC INSERTION TUBE GASTROSTOMY  9/9/2013    Procedure: ENDOSCOPIC INSERTION TUBE GASTROSTOMY;;  Surgeon: Francis Vyas MD;  [facility]     ENDOSCOPIC ULTRASOUND UPPER GASTROINTESTINAL TRACT (GI)  4/29/2011    Procedure:ENDOSCOPIC ULTRASOUND UPPER GASTROINTESTINAL TRACT (GI); Both Procedures done Conjointly; Surgeon:NEREIDA HOUSER; [facility]     ENDOSCOPIC ULTRASOUND UPPER GASTROINTESTINAL TRACT (GI)  9/9/2013    Procedure: ENDOSCOPIC ULTRASOUND UPPER GASTROINTESTINAL TRACT (GI);  Endoscopic Ultrasound Guide Gastrostomy Tube Placement  C-arm;  Surgeon: Noe Lizarraga MD;  [facility]     ENDOSCOPIC ULTRASOUND UPPER GASTROINTESTINAL TRACT (GI) N/A 2/24/2021    Procedure: ENDOSCOPIC ULTRASOUND, ESOPHAGOSCOPY / UPPER GASTROINTESTINAL TRACT (GI), esophagastrogastroduodenoscopy;  Surgeon: Berny Bach MD;  [facility]     ENDOSCOPY  03/25/11    EGD, [facility]     ENDOSCOPY  08/04/09    Upper Endoscopy, [facility]     ENDOSCOPY  01/05/09    Upper Endoscopy, [facility]     ESOPHAGOSCOPY, GASTROSCOPY, DUODENOSCOPY (EGD), COMBINED  4/20/2011    Procedure:COMBINED ESOPHAGOSCOPY, GASTROSCOPY, DUODENOSCOPY (EGD); Surgeon:FRANCIS VYAS; [facility]      ESOPHAGOSCOPY, GASTROSCOPY, DUODENOSCOPY (EGD), COMBINED  6/15/2011    Procedure:COMBINED ESOPHAGOSCOPY, GASTROSCOPY, DUODENOSCOPY (EGD); Surgeon:FRANCIS VYAS; [facility]     ESOPHAGOSCOPY, GASTROSCOPY, DUODENOSCOPY (EGD), COMBINED  6/12/2013    Procedure: COMBINED ESOPHAGOSCOPY, GASTROSCOPY, DUODENOSCOPY (EGD);;  Surgeon: Francis Vyas MD;  [facility]     ESOPHAGOSCOPY, GASTROSCOPY, DUODENOSCOPY (EGD), COMBINED  11/22/2013    Procedure: COMBINED ESOPHAGOSCOPY, GASTROSCOPY, DUODENOSCOPY (EGD);;  Surgeon: Francis Vyas MD;  [facility]     ESOPHAGOSCOPY, GASTROSCOPY, DUODENOSCOPY (EGD), COMBINED  4/30/2014    Procedure: COMBINED ESOPHAGOSCOPY, GASTROSCOPY, DUODENOSCOPY (EGD);  Surgeon: Francis Vyas MD;  [facility]     ESOPHAGOSCOPY, GASTROSCOPY, DUODENOSCOPY (EGD), COMBINED N/A 2/20/2015    Procedure: COMBINED ESOPHAGOSCOPY, GASTROSCOPY, DUODENOSCOPY (EGD), BIOPSY SINGLE OR MULTIPLE;  Surgeon: Baron Scanlon MD;  Location:  OR     ESOPHAGOSCOPY, GASTROSCOPY, DUODENOSCOPY (EGD), COMBINED N/A 9/30/2015    Procedure: COMBINED ESOPHAGOSCOPY, GASTROSCOPY, DUODENOSCOPY (EGD);  Surgeon: Francis Vyas MD;  [facility]     ESOPHAGOSCOPY, GASTROSCOPY, DUODENOSCOPY (EGD), COMBINED N/A 10/3/2019    Procedure: Upper Endoscopy;  Surgeon: Clif Morrow MD;  [facility]     GASTRECTOMY  6/22/2012    Procedure: GASTRECTOMY;  Open Approach, Excise Ulcers,Partial Gastrectomy, Esophagojejunostomy, Hiatal Hernia Repair, Extensive Lysis of Adhesions and Esaphagogastrodudenoscopy.;  Surgeon: Francis Vyas MD;  [facility]     GASTROJEJUNOSTOMY  08/26/09    Extensice enterolysis, partial resect. jejunum, part. resect gastric pouch, gastrojejunostomy anastomosis     HC ESOPH/GAS REFLUX TEST W NASAL IMPED ELECTRODE  8/5/2013    Procedure: ESOPHAGEAL IMPEDENCE FUNCTION TEST 1 HOUR OR LESS;  Surgeon: Halie Lang MD;  Location:  GI     HEAD &  NECK SURGERY  2/15/2017    C5-C6     HERNIA REPAIR  2006    Umbilical hernia     HERNIORRHAPHY HIATAL  6/22/2012    Procedure: HERNIORRHAPHY HIATAL;;  Surgeon: Francis Vyas MD;  [facility]     HERNIORRHAPHY INGUINAL  11/22/2013    Procedure: HERNIORRHAPHY INGUINAL;;  Surgeon: Francis Vyas MD;  [facility]     INSERT PICC LINE Right 12/19/2019    Procedure: Picc Placement;  Surgeon: Per Dumont PA-C;  [facility]     INSERT PICC LINE Right 2/21/2020    Procedure: INSERTION, PICC;  Surgeon: Per Dumont PA-C;  [facility]     INSERT PORT VASCULAR ACCESS Right 12/19/2017    Procedure: INSERT PORT VASCULAR ACCESS;  Right Chest Port Placement ;  Surgeon: Lisandro Alejandro PA-C;  [facility]     INSERT PORT VASCULAR ACCESS Right 8/2/2018    Procedure: INSERT PORT VASCULAR ACCESS;  Place single lumen tunneled central venous access catheter;  Surgeon: Guy Jamil PA-C;  [facility]     IR CVC TUNNEL PLACEMENT > 5 YRS OF AGE  8/7/2019     IR CVC TUNNEL PLACEMENT > 5 YRS OF AGE  4/14/2020     IR CVC TUNNEL PLACEMENT > 5 YRS OF AGE  8/3/2020     IR CVC TUNNEL PLACEMENT > 5 YRS OF AGE  9/4/2020     IR CVC TUNNEL PLACEMENT > 5 YRS OF AGE  2/5/2021     IR CVC TUNNEL PLACEMENT > 5 YRS OF AGE  3/23/2021     IR CVC TUNNEL REMOVAL RIGHT  10/1/2019     IR CVC TUNNEL REMOVAL RIGHT  7/30/2020     IR CVC TUNNEL REMOVAL RIGHT  9/2/2020     IR CVC TUNNEL REMOVAL RIGHT  2/3/2021     IR CVC TUNNEL REMOVAL RIGHT  3/19/2021     IR CVC TUNNEL REVISION RIGHT  5/7/2021     IR FOLLOW UP VISIT OUTPATIENT  8/7/2019     IR PICC PLACEMENT > 5 YRS OF AGE  3/7/2019     IR PICC PLACEMENT > 5 YRS OF AGE  12/19/2019     IR PICC PLACEMENT > 5 YRS OF AGE  2/21/2020     LAPAROTOMY EXPLORATORY  11/22/2013    Procedure: LAPAROTOMY EXPLORATORY;  Exploratory Laparotomy, Upper Endoscopy, Left Inguinal Hernia Repair;  Surgeon: Francis Vyas MD;  [facility]     ORTHOPEDIC  "SURGERY       PICC INSERTION Right 03/16/2017    5fr DL BioFlo PICC, 42cm (3cm external) in the R medial brachial vein w/ tip in the SVC RA junction.     PICC INSERTION Left 09/23/2017    5fr DL BioFlo PICC, 45cm (1cm external) in the L basilic vein w/ tip in the SVC RA junction.     PICC INSERTION Right 05/16/2019    5Fr - 43cm, Medial brachial vein, low SVC     PICC INSERTION Right 10/02/2019    5Fr - 43cm (2cm external), basilic vein, low SVC     SHAYLEE EN Y BOWEL  2003     SOFT TISSUE SURGERY       THORACIC SURGERY       TONSILLECTOMY       TRANSESOPHAGEAL ECHOCARDIOGRAM INTRAOPERATIVE N/A 1/8/2019    Procedure: TRANSESOPHAGEAL ECHOCARDIOGRAM INTRAOPERATIVE;  Surgeon: GENERIC ANESTHESIA PROVIDER;  [facility]     ZC GASTRIC BYPASS,OBESE<100CM SHAYLEE-EN-Y  2002    lost 300 pounds     acetaminophen (TYLENOL) 32 mg/mL liquid  albuterol (PROAIR HFA/PROVENTIL HFA/VENTOLIN HFA) 108 (90 Base) MCG/ACT inhaler  amphetamine-dextroamphetamine (ADDERALL) 20 MG tablet  bisacodyl (DULCOLAX) 5 MG EC tablet  carvedilol (COREG) 6.25 MG tablet  cyanocobalamin (CYANOCOBALAMIN) 1000 MCG/ML injection  diphenhydrAMINE (BENADRYL) 12.5 MG/5ML syrup  EPINEPHrine (ANY BX GENERIC EQUIV) 0.3 MG/0.3ML injection 2-pack  [place] [facility] MANAGED PATIENT  fentaNYL (DURAGESIC) 25 mcg/hr 72 hr patch  insulin syringe-needle U-100 (29G X 1/2\" 1 ML) 29G X 1/2\" 1 ML miscellaneous  lactated ringers infusion  lidocaine (LIDODERM) 5 % patch  LORazepam (ATIVAN) 1 MG tablet  magnesium citrate solution  naloxone (NARCAN) 4 MG/0.1ML nasal spray  nystatin (MYCOSTATIN) 691605 UNIT/GM external cream  ondansetron (ZOFRAN-ODT) 8 MG ODT tab  oxyCODONE (ROXICODONE) 5 MG/5ML solution  pantoprazole (PROTONIX) 40 MG EC tablet  parenteral nutrition - PTA/DISCHARGE ORDER  polyethylene glycol (GOLYTELY) 236 g suspension  polyethylene glycol (MIRALAX) 17 GM/Dose powder  sucralfate (CARAFATE) 1 GM/10ML suspension  VENTOLIN  (90 Base) MCG/ACT " inhaler  vitamin D2 (ERGOCALCIFEROL) 60150 units (1250 mcg) capsule      Allergies   Allergen Reactions     Bactrim [Sulfamethoxazole W/Trimethoprim] Rash     Penicillins Anaphylaxis     Please see Antimicrobial Management Team allergy assessment note 10/10/2018. Patient reported tolerating amoxicillin.     Doxycycline Rash     Vancomycin Rash     Rash after receiving vancomycin 3/28/16 (red man's?). Tolerated with slower infusion and diphenhydramine premed.     Family History  Family History   Problem Relation Age of Onset     Gastrointestinal Disease Mother         Crohns disease     Anxiety Disorder Mother      Thyroid Disease Mother         Grave's disease     Cancer Father         ear cancer-skin cancer/melanoma     Breast Cancer Maternal Grandmother      Macular Degeneration Maternal Grandfather      Anxiety Disorder Sister      Diabetes Maternal Uncle      Breast Cancer Other      Hypertension No family hx of      Hyperlipidemia No family hx of      Cerebrovascular Disease No family hx of      Prostate Cancer No family hx of      Depression No family hx of      Anesthesia Reaction No family hx of      Asthma No family hx of      Osteoporosis No family hx of      Genetic Disorder No family hx of      Obesity No family hx of      Mental Illness No family hx of      Substance Abuse No family hx of      Glaucoma No family hx of      Social History   Social History     Tobacco Use     Smoking status: Current Some Day Smoker     Types: Cigarettes     Smokeless tobacco: Never Used     Tobacco comment: 2/4/2021    smokes 3 cigarettes/day   Substance Use Topics     Alcohol use: No     Comment: quit 2002     Drug use: No      Past medical history, past surgical history, medications, allergies, family history, and social history were reviewed with the patient. No additional pertinent items.     I have reviewed the Medications, Allergies, Past Medical and Surgical History, and Social History in the Epic system.    Review  of Systems   Constitutional: Positive for fatigue.   Respiratory: Negative for shortness of breath.    Cardiovascular: Negative for chest pain.   Gastrointestinal: Negative for abdominal pain.   Neurological: Positive for weakness.   All other systems reviewed and are negative.      Physical Exam   BP: (!) 143/92  Pulse: 77  Temp: 98.3  F (36.8  C)  Resp: 16  Height: 182.9 cm (6')  Weight: 89.8 kg (198 lb)  SpO2: 98 %      Physical Exam  Constitutional:       Appearance: He is well-developed.   HENT:      Head: Normocephalic and atraumatic.   Cardiovascular:      Rate and Rhythm: Normal rate and regular rhythm.      Heart sounds: Normal heart sounds.      Comments: Right sided chest wall tunneled catheter  Pulmonary:      Effort: Pulmonary effort is normal. No respiratory distress.      Breath sounds: No wheezing.   Abdominal:      General: There is no distension.      Palpations: Abdomen is soft.      Tenderness: There is no abdominal tenderness. There is no rebound.   Musculoskeletal:      Cervical back: Normal range of motion and neck supple.   Skin:     General: Skin is warm.   Neurological:      Mental Status: He is alert and oriented to person, place, and time.   Psychiatric:         Behavior: Behavior normal.         Thought Content: Thought content normal.         ED Course     Procedures       Results for orders placed or performed during the hospital encounter of 01/08/22   XR Chest 2 Views     Status: None    Narrative    EXAM: XR CHEST 2 VW  1/8/2022 1:45 PM     HISTORY:  fever       COMPARISON:  Chest x-ray 3/19/2021.    FINDINGS:   Right internal jugular central catheter tip terminates over the low  SVC.    Trachea is midline. Cardiac silhouette is within normal limits.  Pulmonary vasculature is distinct. Trace bilateral pleural effusions.  Bibasilar hazy opacities    Bony structures appear intact. Degenerative changes of the thoracic  spine.      Impression    IMPRESSION:   1.  Hazy right basilar  opacities, atelectasis versus infection.  2.  Trace bilateral pleural effusions.    I have personally reviewed the examination and initial interpretation  and I agree with the findings.    JACOB MATHIS MD         SYSTEM ID:  [id number]   Comprehensive metabolic panel     Status: Abnormal   Result Value Ref Range    Sodium 141 133 - 144 mmol/L    Potassium 3.9 3.4 - 5.3 mmol/L    Chloride 108 94 - 109 mmol/L    Carbon Dioxide (CO2) 29 20 - 32 mmol/L    Anion Gap 4 3 - 14 mmol/L    Urea Nitrogen 10 7 - 30 mg/dL    Creatinine 0.69 0.66 - 1.25 mg/dL    Calcium 8.6 8.5 - 10.1 mg/dL    Glucose 96 70 - 99 mg/dL    Alkaline Phosphatase 76 40 - 150 U/L    AST 21 0 - 45 U/L    ALT 37 0 - 70 U/L    Protein Total 7.0 6.8 - 8.8 g/dL    Albumin 3.2 (L) 3.4 - 5.0 g/dL    Bilirubin Total 0.5 0.2 - 1.3 mg/dL    GFR Estimate >90 >60 mL/min/1.73m2   INR     Status: Normal   Result Value Ref Range    INR 1.07 0.85 - 1.15   CRP inflammation     Status: Abnormal   Result Value Ref Range    CRP Inflammation 52.0 (H) 0.0 - 8.0 mg/L   Asymptomatic COVID-19 Virus (Coronavirus) by PCR Nasopharyngeal     Status: Normal    Specimen: Nasopharyngeal; Swab   Result Value Ref Range    SARS CoV2 PCR Negative Negative, Testing sent to reference lab. Results will be returned via unsolicited result    Narrative    Testing was performed using the Xpert Xpress SARS-CoV-2 Assay on the  Cepheid Gene-Xpert Instrument Systems. Additional information about  this Emergency Use Authorization (EUA) assay can be found via the Lab  Guide. This test should be ordered for the detection of SARS-CoV-2 in  individuals who meet SARS-CoV-2 clinical and/or epidemiological  criteria. Test performance is unknown in asymptomatic patients. This  test is for in vitro diagnostic use under the FDA EUA for  laboratories certified under CLIA to perform high complexity testing.  This test has not been FDA cleared or approved. A negative result  does not rule out the presence of PCR  inhibitors in the specimen or  target RNA in concentration below the limit of detection for the  assay. The possibility of a false negative should be considered if  the patient's recent exposure or clinical presentation suggests  COVID-19. This test was validated by the [facility]. This laboratory is certified under  the Clinical Laboratory Improvement Amendments of 1988 (CLIA-88) as  qualified to perform high complexity laboratory testing.     CBC with platelets and differential     Status: Abnormal   Result Value Ref Range    WBC Count 4.6 4.0 - 11.0 10e3/uL    RBC Count 3.71 (L) 4.40 - 5.90 10e6/uL    Hemoglobin 11.3 (L) 13.3 - 17.7 g/dL    Hematocrit 36.0 (L) 40.0 - 53.0 %    MCV 97 78 - 100 fL    MCH 30.5 26.5 - 33.0 pg    MCHC 31.4 (L) 31.5 - 36.5 g/dL    RDW 15.8 (H) 10.0 - 15.0 %    Platelet Count 145 (L) 150 - 450 10e3/uL    % Neutrophils 88 %    % Lymphocytes 8 %    % Monocytes 2 %    % Eosinophils 2 %    % Basophils 0 %    % Immature Granulocytes 0 %    NRBCs per 100 WBC 0 <1 /100    Absolute Neutrophils 4.0 1.6 - 8.3 10e3/uL    Absolute Lymphocytes 0.4 (L) 0.8 - 5.3 10e3/uL    Absolute Monocytes 0.1 0.0 - 1.3 10e3/uL    Absolute Eosinophils 0.1 0.0 - 0.7 10e3/uL    Absolute Basophils 0.0 0.0 - 0.2 10e3/uL    Absolute Immature Granulocytes 0.0 <=0.4 10e3/uL    Absolute NRBCs 0.0 10e3/uL   CBC with platelets differential     Status: Abnormal    Narrative    The following orders were created for panel order CBC with platelets differential.  Procedure                               Abnormality         Status                     ---------                               -----------         ------                     CBC with platelets and d...[[id number]]  Abnormal            Final result                 Please view results for these tests on the individual orders.     Medications   pharmacy alert - intermittent dosing (has no administration in time range)    ondansetron (ZOFRAN) injection 4 mg (has no administration in time range)   acetaminophen (TYLENOL) solution 1,000 mg (has no administration in time range)   ceFAZolin (ANCEF) intermittent infusion 2 g in 100 mL dextrose PRE-MIX (2 g Intravenous New Bag 1/8/22 5262)          The medical record was reviewed and interpreted.  Current labs reviewed and interpreted.  Previous labs reviewed and interpreted.     No results found. However, due to the size of the patient record, not all encounters were searched. Please check Results Review for a complete set of results.  Medications - No data to display       Assessments & Plan (with Medical Decision Making)   Patient is a very nice 51-year-old male who presents to the ER due to a positive blood culture.  Patient has a history of having a right-sided Cm.Patient  had past positive staph epidermis from his Cm as well as from his peripheral line.  Patient has sepsis from staph epidermidis in the past.  Patient's medical chart was reviewed and his past discharge summary was reviewed.  Plan will be to start the patient on IV antibiotics and admit for further care.  Plan of care was discussed with the triage doctor on-call.  Laboratory evaluation was done here in the emergency department.  Patient has been having fevers which is consistent with his bacteremia.  Patient's electrolytes were stable.  Patient CRP is elevated to 52.  Patient has a normal WBC.  Report was given for admission.  ---  This part of the medical record was transcribed by Asad Oliva, Medical Scribe, from a dictation done by Justina Lockett MD.       I have reviewed the nursing notes.    I have reviewed the findings, diagnosis, plan and need for follow up with the patient.    New Prescriptions    No medications on file       Final diagnoses:   Gram-positive bacteremia       I, Asad Oliva am serving as a trained medical scribe to document services personally performed by Justina  Flakito Lockett MD, based on the provider's statements to me.      I, Justina Lockett MD, was physically present and have reviewed and verified the accuracy of this note documented by Asad Oliva.     Justina Lockett MD  1/8/2022   [facility] EMERGENCY DEPARTMENT     Justina Lockett MD  01/08/22 1702

## 2022-01-08 NOTE — H&P
[facility]    History and Physical - Dez 2 Service        Date of Admission:  1/8/2022    Assessment & Plan      Parker Acevedo is a 51 year old male admitted on 1/8/2022. He has a history of recurrent bacteremia/line infections, history of Celestino-en-Y gastric bypass, esophagojejunostomy complicated by short gut syndrome, severe malnutrition on TPN, HTN, ADHD, GERD, asthma. He is admitted for Staphylococcus epidermidis bacteremia, concerning for line infection.     #Staphylococcus epidermidis bacteremia in the setting of recurrent line infections   #Fevers  Presented with feeling generally unwell with fevers, symptoms started ~1/4/2022. Had blood cultures from Cm and peripheral that both grew S. epi. Verigene negative for mecA gene. Likely source is Cm catheter which will likely have to be removed. As for other etiologies, he does not have a history of endocarditis/mechanical valve/pacemaker, no history of joint replacement. He does have titanium in his neck, but no neck pain and normal range of motion of neck. As for other etiologies of fever, he is COVID negative, does not have urinary or respiratory symptoms. CRP elevated to 52. He is AOx4 and hemodynamically stable, does not meet sepsis criteria. Will consult ID as they have been following him outpatient and continue to treat with antibiotics.   - ID consulted  - cefazolin 2g q8h (1/8 - P) (disucssed antibiotic choice with pharmacy)  - continue pta acetaminophen 500mg q4h prn  - TTE (ordered)  - urine drug screen ordered   - 1L LR bolus ordered   - cultures:   - 1/6/2022 Cm blood culture- Staphylococcus epidermidis    - 1/6/2022 peripheral blood culture- Staphylococcus epidermidis    - 1/8/2022 Cm blood culture- in process   - 1/8/2022 peripheral blood culture- in process  - ordered Cm and peripheral culture for 1/9/2022    #Complex GI surgery history   #Right Cm catheter for  TPN  #G tube  #Nausea, chronic  Will likely need to have Cm catheter removed during this admission for source control of Staphylococcus epidermidis bacteremia, as above. He normally gets TPN as well as fluids (~1-2L LR daily) through the Cm.   - nutrition consulted for home TPN  - continue pta ondansetron 8mg q8h prn  - continue pta sucralfate 1g qid prn  - continue pta nystatin cream (for G tube site)      Chronic/stable    #Chronic normocytic anemia, stable  Baseline hgb ~10-12.  - continue to monitor    #Pain  - continue pta fentanyl patch q48 hours  - continue pta oxycodone 5-10mg tid prn    #Anxiety  - continue pta lorazepam 1mg daily prn    #ADHD  - continue pta Adderall 20mg daily    #HTN  - continue pta carvedilol 6.25mg bid      #Asthma  - continue pta albuterol prn  - continue pta     #GERD  - continue pta pantoprazole 40mg daily     Diet:  regular diet  DVT Prophylaxis: Pneumatic Compression Devices  Sanchez Catheter: Not present  Fluids: 1L LR bolus  Central Lines: PRESENT     Code Status:  full code     Clinically Significant Risk Factors Present on Admission                # Overweight: last Body mass index is 26.85 kg/m .      Disposition Plan   Expected Discharge: TBD  Anticipated discharge location:  Awaiting care coordination huddle  Delays: pending management of bacteremia, workup       The patient's care was discussed with the Attending Physician, Dr. Dias.    John Wren MD  [address]  Securely message with the Vocera Web Console (learn more here)  Text page via [employer] Paging/Directory    Please see sign in/sign out for up to date coverage information  ______________________________________________________________________    Chief Complaint   bacteremia    History is obtained from the patient    History of Present Illness   Parker Acevedo is a 51 year old male who has a history of recurrent bacteremia/line  "infections, complicate GI surgical history now on TPN through Cm catheter and has G tube, HTN, asthma, ADHD, and is admitted for Staph epi bacteremia. Presents with his spouse.    Tuesday 1/4 around 10pm he was feeling dizzy and hot. Also was feeling sore all over including headaches and sore throat. Checked temp and it was 103F. He was febrile whenever he would check. He had phone visits on Wednesday 1/5 and Thursday 1/6. Home nurse tata labs for him. He has a home nurse come once per week (blood draws, changes bandages, take care of catheter, vitals). He developed a rash around the port on 1/7/2022.     Currently has a headache (8 or 9 out of 10). No head trauma. No new breathing difficulty. No new cough. No new chest pains. Has abdominal pain, which is not new for him. No changes with urination. No pain with urinating. No changes in bowel movements. Has nausea which is normal for him, has been a little more nauseous now; has Zofran at home which is helpful. No recent changes in appetite.    Of note, he has a complicated abdominal surgery history. He has been on TPN for a long time. Also has a G tube for \"venting.\"     No heart attack, stroke, cancer, or blood clot history. No history of endocarditis. No joint replacement. Has titanium in his neck 5 or 6 years ago (after a fall).     Below is 1/7/2022 ID note for reference:     1/7/2022   Infectious Diseases Telephone Note:      I called Rose today because Parker's culture from his Cm is positive for S epi. Peripheral culture is negative so far.      Parker is still having fevers but otherwise stable. Parker and Rose would strongly prefer not to come to the ED if possible given the long wait times. 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 Parker and Rose know that standard of care for a positive blood culture is hospital admission, " but given Parker's extensive history with line infections and both of their sophistication managing antibiotics at home, we made the following plan:      1. [facility] kindly agreed to [facility] repeat blood cultures from both Cm lumens and peripheral. We will also get a repeat CBC with differential (normal on 1/4 just before fevers started).   2. We will restart vancomycin at home which he has been on many times before while awaiting repeat blood cultures. First dose to be after blood cultures.   3. He is already scheduled for a COVID test tomorrow (1/8)   4. If further cultures are positive for S epidermidis, he will need to come to the ED for further evaluation and line management.   5. If he develops any symptoms other than fevers, such as somnolence or confusion, he needs to come to the ED immediately.        Review of Systems    The 10 point Review of Systems is negative other than noted in the HPI or here.     Past Medical History    I have reviewed this patient's medical history and updated it with pertinent information if needed.   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     Complication of anesthesia      Difficulty swallowing      Gastric ulcer, unspecified as acute or chronic, without mention of hemorrhage, perforation, or obstruction      Gastro-oesophageal reflux disease      Head injury      Hiatal hernia      Other bladder disorder      Other chronic pain      PONV (postoperative nausea and vomiting)      Severe malnutrition (H)     TPN     Short gut syndrome      Tobacco abuse         Past Surgical History   I have reviewed this patient's surgical history and updated it with pertinent information  if needed.  Past Surgical History:   Procedure Laterality Date     AMPUTATION       APPENDECTOMY       BACK SURGERY  11/3/2014    curve in the spine     BIOPSY LYMPH NODE CERVICAL N/A 2/20/2015    Procedure: BIOPSY LYMPH NODE CERVICAL;  Surgeon: Baron Scanlon MD;  [facility]     CHOLECYSTECTOMY       COLONOSCOPY N/A 7/14/2021    Procedure: COLONOSCOPY;  Surgeon: Jimbo Estrada MD;  Location: [facility] OR     DISCECTOMY, FUSION CERVICAL ANTERIOR ONE LEVEL, COMBINED N/A 2/15/2017    Procedure: COMBINED DISCECTOMY, FUSION CERVICAL ANTERIOR ONE LEVEL;  Surgeon: Darren Campos MD;  [facility]     ENDOSCOPIC INSERTION TUBE GASTROSTOMY  9/9/2013    Procedure: ENDOSCOPIC INSERTION TUBE GASTROSTOMY;;  Surgeon: Francis Vyas MD;  [facility]     ENDOSCOPIC ULTRASOUND UPPER GASTROINTESTINAL TRACT (GI)  4/29/2011    Procedure:ENDOSCOPIC ULTRASOUND UPPER GASTROINTESTINAL TRACT (GI); Both Procedures done Conjointly; Surgeon:NEREIDA HOUSER; [facility]     ENDOSCOPIC ULTRASOUND UPPER GASTROINTESTINAL TRACT (GI)  9/9/2013    Procedure: ENDOSCOPIC ULTRASOUND UPPER GASTROINTESTINAL TRACT (GI);  Endoscopic Ultrasound Guide Gastrostomy Tube Placement  C-arm;  Surgeon: Noe Lizarraga MD;  [facility]     ENDOSCOPIC ULTRASOUND UPPER GASTROINTESTINAL TRACT (GI) N/A 2/24/2021    Procedure: ENDOSCOPIC ULTRASOUND, ESOPHAGOSCOPY / UPPER GASTROINTESTINAL TRACT (GI), esophagastrogastroduodenoscopy;  Surgeon: Berny Bach MD;  [facility]     ENDOSCOPY  03/25/11    EGD, [facility]     ENDOSCOPY  08/04/09    Upper Endoscopy, [facility]     ENDOSCOPY  01/05/09    Upper Endoscopy, [facility]     ESOPHAGOSCOPY, GASTROSCOPY, DUODENOSCOPY (EGD), COMBINED  4/20/2011    Procedure:COMBINED ESOPHAGOSCOPY, GASTROSCOPY, DUODENOSCOPY (EGD); Surgeon:FRANCIS VYAS; [facility]     ESOPHAGOSCOPY, GASTROSCOPY, DUODENOSCOPY (EGD), COMBINED  6/15/2011     Procedure:COMBINED ESOPHAGOSCOPY, GASTROSCOPY, DUODENOSCOPY (EGD); Surgeon:FRANCIS VYAS; [facility]     ESOPHAGOSCOPY, GASTROSCOPY, DUODENOSCOPY (EGD), COMBINED  6/12/2013    Procedure: COMBINED ESOPHAGOSCOPY, GASTROSCOPY, DUODENOSCOPY (EGD);;  Surgeon: Francis Vyas MD;  [facility]     ESOPHAGOSCOPY, GASTROSCOPY, DUODENOSCOPY (EGD), COMBINED  11/22/2013    Procedure: COMBINED ESOPHAGOSCOPY, GASTROSCOPY, DUODENOSCOPY (EGD);;  Surgeon: Francis Vyas MD;  [facility]     ESOPHAGOSCOPY, GASTROSCOPY, DUODENOSCOPY (EGD), COMBINED  4/30/2014    Procedure: COMBINED ESOPHAGOSCOPY, GASTROSCOPY, DUODENOSCOPY (EGD);  Surgeon: Francis Vyas MD;  [facility]     ESOPHAGOSCOPY, GASTROSCOPY, DUODENOSCOPY (EGD), COMBINED N/A 2/20/2015    Procedure: COMBINED ESOPHAGOSCOPY, GASTROSCOPY, DUODENOSCOPY (EGD), BIOPSY SINGLE OR MULTIPLE;  Surgeon: Baron Scanlon MD;  Location:  OR     ESOPHAGOSCOPY, GASTROSCOPY, DUODENOSCOPY (EGD), COMBINED N/A 9/30/2015    Procedure: COMBINED ESOPHAGOSCOPY, GASTROSCOPY, DUODENOSCOPY (EGD);  Surgeon: Francis Vyas MD;  [facility]     ESOPHAGOSCOPY, GASTROSCOPY, DUODENOSCOPY (EGD), COMBINED N/A 10/3/2019    Procedure: Upper Endoscopy;  Surgeon: Clif Morrow MD;  [facility]     GASTRECTOMY  6/22/2012    Procedure: GASTRECTOMY;  Open Approach, Excise Ulcers,Partial Gastrectomy, Esophagojejunostomy, Hiatal Hernia Repair, Extensive Lysis of Adhesions and Esaphagogastrodudenoscopy.;  Surgeon: Francis Vyas MD;  [facility]     GASTROJEJUNOSTOMY  08/26/09    Extensice enterolysis, partial resect. jejunum, part. resect gastric pouch, gastrojejunostomy anastomosis     HC ESOPH/GAS REFLUX TEST W NASAL IMPED ELECTRODE  8/5/2013    Procedure: ESOPHAGEAL IMPEDENCE FUNCTION TEST 1 HOUR OR LESS;  Surgeon: Halie Lang MD;  Location:  GI     HEAD & NECK SURGERY  2/15/2017    C5-C6     HERNIA REPAIR  2006    Umbilical  hernia     HERNIORRHAPHY HIATAL  6/22/2012    Procedure: HERNIORRHAPHY HIATAL;;  Surgeon: Francis Vyas MD;  [facility]     HERNIORRHAPHY INGUINAL  11/22/2013    Procedure: HERNIORRHAPHY INGUINAL;;  Surgeon: Francis Vyas MD;  [facility]     INSERT PICC LINE Right 12/19/2019    Procedure: Picc Placement;  Surgeon: Per Dumont PA-C;  [facility]     INSERT PICC LINE Right 2/21/2020    Procedure: INSERTION, PICC;  Surgeon: Per Dumont PA-C;  [facility]     INSERT PORT VASCULAR ACCESS Right 12/19/2017    Procedure: INSERT PORT VASCULAR ACCESS;  Right Chest Port Placement ;  Surgeon: Lisandro Alejandro PA-C;  [facility]     INSERT PORT VASCULAR ACCESS Right 8/2/2018    Procedure: INSERT PORT VASCULAR ACCESS;  Place single lumen tunneled central venous access catheter;  Surgeon: Guy Jamil PA-C;  [facility]     IR CVC TUNNEL PLACEMENT > 5 YRS OF AGE  8/7/2019     IR CVC TUNNEL PLACEMENT > 5 YRS OF AGE  4/14/2020     IR CVC TUNNEL PLACEMENT > 5 YRS OF AGE  8/3/2020     IR CVC TUNNEL PLACEMENT > 5 YRS OF AGE  9/4/2020     IR CVC TUNNEL PLACEMENT > 5 YRS OF AGE  2/5/2021     IR CVC TUNNEL PLACEMENT > 5 YRS OF AGE  3/23/2021     IR CVC TUNNEL REMOVAL RIGHT  10/1/2019     IR CVC TUNNEL REMOVAL RIGHT  7/30/2020     IR CVC TUNNEL REMOVAL RIGHT  9/2/2020     IR CVC TUNNEL REMOVAL RIGHT  2/3/2021     IR CVC TUNNEL REMOVAL RIGHT  3/19/2021     IR CVC TUNNEL REVISION RIGHT  5/7/2021     IR FOLLOW UP VISIT OUTPATIENT  8/7/2019     IR PICC PLACEMENT > 5 YRS OF AGE  3/7/2019     IR PICC PLACEMENT > 5 YRS OF AGE  12/19/2019     IR PICC PLACEMENT > 5 YRS OF AGE  2/21/2020     LAPAROTOMY EXPLORATORY  11/22/2013    Procedure: LAPAROTOMY EXPLORATORY;  Exploratory Laparotomy, Upper Endoscopy, Left Inguinal Hernia Repair;  Surgeon: Francis Vyas MD;  [facility]     ORTHOPEDIC SURGERY       PICC INSERTION Right 03/16/2017    5fr DL BioFlo PICC, 42cm  (3cm external) in the R medial brachial vein w/ tip in the SVC RA junction.     PICC INSERTION Left 09/23/2017    5fr DL BioFlo PICC, 45cm (1cm external) in the L basilic vein w/ tip in the SVC RA junction.     PICC INSERTION Right 05/16/2019    5Fr - 43cm, Medial brachial vein, low SVC     PICC INSERTION Right 10/02/2019    5Fr - 43cm (2cm external), basilic vein, low SVC     SHAYLEE EN Y BOWEL  2003     SOFT TISSUE SURGERY       THORACIC SURGERY       TONSILLECTOMY       TRANSESOPHAGEAL ECHOCARDIOGRAM INTRAOPERATIVE N/A 1/8/2019    Procedure: TRANSESOPHAGEAL ECHOCARDIOGRAM INTRAOPERATIVE;  Surgeon: GENERIC ANESTHESIA PROVIDER;  [facility]     ZC GASTRIC BYPASS,OBESE<100CM SHAYLEE-EN-Y  2002    lost 300 pounds      Social History   - tobacco- 5-7 cigarettes per day  - alcohol- no  - drugs- no iv drug use.  - living- lives in a house with his wife wife and kids and dogs (Yorkies)  - code status- full code   - surrogate decision maker- Spouse Rose (number in chat)    Family History   I have reviewed this patient's family history and updated it with pertinent information if needed.  Family History   Problem Relation Age of Onset     Gastrointestinal Disease Mother         Crohns disease     Anxiety Disorder Mother      Thyroid Disease Mother         Grave's disease     Cancer Father         ear cancer-skin cancer/melanoma     Breast Cancer Maternal Grandmother      Macular Degeneration Maternal Grandfather      Anxiety Disorder Sister      Diabetes Maternal Uncle      Breast Cancer Other      Hypertension No family hx of      Hyperlipidemia No family hx of      Cerebrovascular Disease No family hx of      Prostate Cancer No family hx of      Depression No family hx of      Anesthesia Reaction No family hx of      Asthma No family hx of      Osteoporosis No family hx of      Genetic Disorder No family hx of      Obesity No family hx of      Mental Illness No family hx of      Substance Abuse No family hx of       "Glaucoma No family hx of        Prior to Admission Medications   Prior to Admission Medications   Prescriptions Last Dose Informant Patient Reported? Taking?   EPINEPHrine (ANY BX GENERIC EQUIV) 0.3 MG/0.3ML injection 2-pack   Yes No   [facility]NA[facility]ED PATIENT  Self No No   Sig: Contact [facility] for patient specific medication information at [phone] on admission and discharge from the hospital.  Phones are answered 24 hours a day 7 days a week 365 days a year.    Providers - Choose \"CONTINUE HOME MED (no script)\" at discharge if patient treatment with home infusion will continue.   LORazepam (ATIVAN) 1 MG tablet   No No   Sig: TAKE 1 TABLET (1MG) BY MOUTH AS NEEDED FOR ANXIETY WITH TPN AND MEDICATIONS . JUST ONCE A DAY (30 TO LAST 30 DAYS)   VENTOLIN  (90 Base) MCG/ACT inhaler   No No   Sig: INHALE TWO PUFFS BY MOUTH EVERY 4 HOURS AS NEEDED FOR SHORTNESS OF BREATH /DYSPNEA OR WHEEZING   acetaminophen (TYLENOL) 32 mg/mL liquid   No No   Sig: Take 15.65 mLs (500 mg) by mouth every 4 hours as needed for fever or mild pain   albuterol (PROAIR HFA/PROVENTIL HFA/VENTOLIN HFA) 108 (90 Base) MCG/ACT inhaler   Yes No   Sig: Inhale 2 puffs into the lungs every 6 hours as needed   amphetamine-dextroamphetamine (ADDERALL) 20 MG tablet   No No   Sig: TAKE ONE TABLET BY MOUTH ONCE DAILY   bisacodyl (DULCOLAX) 5 MG EC tablet   No No   Sig: Take as directed. One day prior to exam at 10:00am take 2 tablets   carvedilol (COREG) 6.25 MG tablet  Self Yes No   Sig: Take 6.25 mg by mouth 2 times daily (with meals)   cyanocobalamin (CYANOCOBALAMIN) 1000 MCG/ML injection   No No   Sig: INJECT 1 ML INTO THE MUSCLE EVERY 30 DAYS   diphenhydrAMINE (BENADRYL) 12.5 MG/5ML syrup   No No   Sig: Take 25 mg by mouth every 4 hours as needed for itching, allergies or sleep   fentaNYL (DURAGESIC) 25 mcg/hr 72 hr patch   No No   Sig: Place 1 patch onto the skin every 48 hours remove old patch. Fill 12/31/21 and " "start 01/02/21.   insulin syringe-needle U-100 (29G X 1/2\" 1 ML) 29G X 1/2\" 1 ML miscellaneous   No No   Sig: Use to inject b12 every 30 days   lactated ringers infusion   No No   Sig: Inject 1,000-2,000 mLs into the vein daily as needed   lidocaine (LIDODERM) 5 % patch   No No   Sig: Place 1-2 patches onto the skin every 24 hours Wear for 12 hours, remove for 12 hours.  OK to cut to better fit to size.   magnesium citrate solution   No No   Sig: Take as directed. Two days prior to exam drink 10oz bottle of magnesium citrate at 4:00pm   naloxone (NARCAN) 4 MG/0.1ML nasal spray   No No   Sig: Spray 1 spray (4 mg) into one nostril alternating nostrils once as needed for opioid reversal every 2-3 minutes until assistance arrives   nystatin (MYCOSTATIN) 915798 UNIT/GM external cream   No No   Sig: APPLY TOPICALLY 2 TIMES DAILY   ondansetron (ZOFRAN-ODT) 8 MG ODT tab   No No   Sig: DISSOLVE ONE TABLET ON TONGUE EVERY 8 HOURS AS NEEDED FOR NAUSEA   oxyCODONE (ROXICODONE) 5 MG/5ML solution   No No   Sig: Take 5-10 mLs (5-10 mg) by mouth 3 times daily as needed for pain Max of 30mg/day. Put at least 4 hours between doses. Fill 12/31/21 and start 01/02/21. 30 day supply.   pantoprazole (PROTONIX) 40 MG EC tablet   No No   Sig: Take 1 tablet (40 mg) by mouth daily   parenteral nutrition - PTA/DISCHARGE ORDER   Yes No   Sig: Patient receives 2050 mL TPN every 14 hours through PICC at [facility].   polyethylene glycol (GOLYTELY) 236 g suspension   No No   Sig: Take as directed. One day before your exam fill the first container with water. Cover and shake until mixed well. At 3:00pm drink one 8oz glass every 10-15 minutes until half of the first container is empty. Store the remainder in the refrigerator. At 8:00pm drink the second half of the first container until it is gone. Before you go to bed mix the second container with water and put in refrigerator. Six hours before your check in time drink one 8oz glass " every 10-15 minutes until half of container is empty. Discard the remainder of solution.   polyethylene glycol (MIRALAX) 17 GM/Dose powder   No No   Sig: Take 17 g by mouth daily   sucralfate (CARAFATE) 1 GM/10ML suspension   No No   Sig: TAKE 10MLS  BY MOUTH FOUR TIMES A DAY AS NEEDED   vitamin D2 (ERGOCALCIFEROL) 10541 units (1250 mcg) capsule   No No   Sig: TAKE 1 CAPSULE (50,000 UNITS) BY MOUTH ONCE A WEEK      Facility-Administered Medications: None     Allergies   Allergies   Allergen Reactions     Bactrim [Sulfamethoxazole W/Trimethoprim] Rash     Penicillins Anaphylaxis     Please see Antimicrobial Management Team allergy assessment note 10/10/2018. Patient reported tolerating amoxicillin.     Doxycycline Rash     Vancomycin Rash     Rash after receiving vancomycin 3/28/16 (red man's?). Tolerated with slower infusion and diphenhydramine premed.       Physical Exam   Vital Signs: Temp: (S) (!) 102.9  F (39.4  C) Temp src: Oral BP: 136/78 Pulse: 100   Resp: 19 SpO2: 94 % O2 Device: None (Room air)    Weight: 198 lbs 0 oz    General: sitting at side of bed, NAD  HEENT: no scleral icterus or conjunctival injection, oropharynx clear. Normal range of motion of neck.   Resp: clear to auscultation bilaterally, no crackles or wheezes  Cardiac: regular rate and rhythm, no murmurs  Chest: right Cm catheter without surrounding purulence, tenderness, erythema  Abdomen: soft, non-tender, non-distended. Left G tube without purulence.   Extremities: warm, no lower extremity edema  MSK: normal muscle bulk. No spinal tenderness.  Skin: no lesions on exposed skin  Neuro: alert, oriented x4. CN II-XII intact. 5/5 strength in upper and lower extremities bilaterally.   Psych: answers questions appropriately, telling jokes    Data   Data reviewed today: I reviewed all medications, new labs and imaging results over the last 24 hours.

## 2022-01-08 NOTE — ED TRIAGE NOTES
Triage Assessment & Note:    Patient presents with: PT reports he was sent here for positive blood cultures. PT reports that he does not feel well today.     Home Treatments/Remedies: None    Febrile / Afebrile?    Duration of C/o:  Unknown     Manjeet Landa RN  January 8, 2022

## 2022-01-09 ENCOUNTER — HOME INFUSION (PRE-WILLOW HOME INFUSION) (OUTPATIENT)
Dept: PHARMACY | Facility: CLINIC | Age: 52
End: 2022-01-09

## 2022-01-09 ENCOUNTER — APPOINTMENT (OUTPATIENT)
Dept: CARDIOLOGY | Facility: CLINIC | Age: 52
DRG: 314 | End: 2022-01-09
Attending: EMERGENCY MEDICINE
Payer: COMMERCIAL

## 2022-01-09 LAB
ALBUMIN SERPL-MCNC: 2.5 G/DL (ref 3.4–5)
ALP SERPL-CCNC: 60 U/L (ref 40–150)
ALT SERPL W P-5'-P-CCNC: 28 U/L (ref 0–70)
AMPHETAMINES UR QL SCN: ABNORMAL
ANION GAP SERPL CALCULATED.3IONS-SCNC: 4 MMOL/L (ref 3–14)
AST SERPL W P-5'-P-CCNC: 11 U/L (ref 0–45)
BARBITURATES UR QL: ABNORMAL
BASOPHILS # BLD AUTO: 0 10E3/UL (ref 0–0.2)
BASOPHILS NFR BLD AUTO: 0 %
BENZODIAZ UR QL: ABNORMAL
BILIRUB DIRECT SERPL-MCNC: 0.2 MG/DL (ref 0–0.2)
BILIRUB SERPL-MCNC: 0.5 MG/DL (ref 0.2–1.3)
BUN SERPL-MCNC: 14 MG/DL (ref 7–30)
CALCIUM SERPL-MCNC: 8.8 MG/DL (ref 8.5–10.1)
CANNABINOIDS UR QL SCN: ABNORMAL
CHLORIDE BLD-SCNC: 110 MMOL/L (ref 94–109)
CO2 SERPL-SCNC: 26 MMOL/L (ref 20–32)
COCAINE UR QL: ABNORMAL
CREAT SERPL-MCNC: 0.66 MG/DL (ref 0.66–1.25)
EOSINOPHIL # BLD AUTO: 0.1 10E3/UL (ref 0–0.7)
EOSINOPHIL NFR BLD AUTO: 1 %
ERYTHROCYTE [DISTWIDTH] IN BLOOD BY AUTOMATED COUNT: 15.9 % (ref 10–15)
FENTANYL UR CFM-MCNC: 6 NG/ML
FENTANYL/CREAT UR: 9 NG/MG {CREAT}
GFR SERPL CREATININE-BSD FRML MDRD: >90 ML/MIN/1.73M2
GLUCOSE BLD-MCNC: 114 MG/DL (ref 70–99)
GLUCOSE BLDC GLUCOMTR-MCNC: 108 MG/DL (ref 70–99)
GLUCOSE BLDC GLUCOMTR-MCNC: 109 MG/DL (ref 70–99)
GLUCOSE BLDC GLUCOMTR-MCNC: 74 MG/DL (ref 70–99)
GLUCOSE BLDC GLUCOMTR-MCNC: 95 MG/DL (ref 70–99)
HCT VFR BLD AUTO: 31.8 % (ref 40–53)
HGB BLD-MCNC: 9.8 G/DL (ref 13.3–17.7)
IMM GRANULOCYTES # BLD: 0.1 10E3/UL
IMM GRANULOCYTES NFR BLD: 1 %
INR PPP: 1.27 (ref 0.85–1.15)
LVEF ECHO: NORMAL
LYMPHOCYTES # BLD AUTO: 1.1 10E3/UL (ref 0.8–5.3)
LYMPHOCYTES NFR BLD AUTO: 9 %
MAGNESIUM SERPL-MCNC: 2.2 MG/DL (ref 1.6–2.3)
MCH RBC QN AUTO: 30 PG (ref 26.5–33)
MCHC RBC AUTO-ENTMCNC: 30.8 G/DL (ref 31.5–36.5)
MCV RBC AUTO: 97 FL (ref 78–100)
MONOCYTES # BLD AUTO: 1.2 10E3/UL (ref 0–1.3)
MONOCYTES NFR BLD AUTO: 10 %
NEUTROPHILS # BLD AUTO: 10 10E3/UL (ref 1.6–8.3)
NEUTROPHILS NFR BLD AUTO: 79 %
NORFENTANYL UR CFM-MCNC: 62 NG/ML
NORFENTANYL/CREAT UR: 97 NG/MG {CREAT}
NRBC # BLD AUTO: 0 10E3/UL
NRBC BLD AUTO-RTO: 0 /100
OPIATES UR QL SCN: ABNORMAL
OXYCODONE UR CFM-MCNC: 1600 NG/ML
OXYCODONE/CREAT UR: 2500 NG/MG {CREAT}
OXYMORPHONE UR CFM-MCNC: 1600 NG/ML
OXYMORPHONE/CREAT UR: 2500 NG/MG {CREAT}
PCP UR QL SCN: ABNORMAL
PHOSPHATE SERPL-MCNC: 3 MG/DL (ref 2.5–4.5)
PLATELET # BLD AUTO: 130 10E3/UL (ref 150–450)
POTASSIUM BLD-SCNC: 3.9 MMOL/L (ref 3.4–5.3)
PROT SERPL-MCNC: 6 G/DL (ref 6.8–8.8)
RBC # BLD AUTO: 3.27 10E6/UL (ref 4.4–5.9)
SODIUM SERPL-SCNC: 140 MMOL/L (ref 133–144)
WBC # BLD AUTO: 12.5 10E3/UL (ref 4–11)

## 2022-01-09 PROCEDURE — 87077 CULTURE AEROBIC IDENTIFY: CPT | Performed by: INTERNAL MEDICINE

## 2022-01-09 PROCEDURE — 36592 COLLECT BLOOD FROM PICC: CPT | Performed by: INTERNAL MEDICINE

## 2022-01-09 PROCEDURE — 85025 COMPLETE CBC W/AUTO DIFF WBC: CPT | Performed by: EMERGENCY MEDICINE

## 2022-01-09 PROCEDURE — 99223 1ST HOSP IP/OBS HIGH 75: CPT | Performed by: INTERNAL MEDICINE

## 2022-01-09 PROCEDURE — 250N000013 HC RX MED GY IP 250 OP 250 PS 637

## 2022-01-09 PROCEDURE — 93308 TTE F-UP OR LMTD: CPT | Mod: 26 | Performed by: INTERNAL MEDICINE

## 2022-01-09 PROCEDURE — 93308 TTE F-UP OR LMTD: CPT

## 2022-01-09 PROCEDURE — 93325 DOPPLER ECHO COLOR FLOW MAPG: CPT | Mod: 26 | Performed by: INTERNAL MEDICINE

## 2022-01-09 PROCEDURE — 85610 PROTHROMBIN TIME: CPT | Performed by: INTERNAL MEDICINE

## 2022-01-09 PROCEDURE — 250N000009 HC RX 250: Performed by: INTERNAL MEDICINE

## 2022-01-09 PROCEDURE — 99233 SBSQ HOSP IP/OBS HIGH 50: CPT | Mod: GC | Performed by: INTERNAL MEDICINE

## 2022-01-09 PROCEDURE — 80053 COMPREHEN METABOLIC PANEL: CPT | Performed by: EMERGENCY MEDICINE

## 2022-01-09 PROCEDURE — 250N000013 HC RX MED GY IP 250 OP 250 PS 637: Performed by: EMERGENCY MEDICINE

## 2022-01-09 PROCEDURE — 82248 BILIRUBIN DIRECT: CPT | Performed by: INTERNAL MEDICINE

## 2022-01-09 PROCEDURE — 258N000003 HC RX IP 258 OP 636

## 2022-01-09 PROCEDURE — 93325 DOPPLER ECHO COLOR FLOW MAPG: CPT

## 2022-01-09 PROCEDURE — 120N000002 HC R&B MED SURG/OB UMMC

## 2022-01-09 PROCEDURE — 84134 ASSAY OF PREALBUMIN: CPT | Performed by: INTERNAL MEDICINE

## 2022-01-09 PROCEDURE — 87040 BLOOD CULTURE FOR BACTERIA: CPT | Performed by: EMERGENCY MEDICINE

## 2022-01-09 PROCEDURE — 93321 DOPPLER ECHO F-UP/LMTD STD: CPT | Mod: 26 | Performed by: INTERNAL MEDICINE

## 2022-01-09 PROCEDURE — 258N000003 HC RX IP 258 OP 636: Performed by: INTERNAL MEDICINE

## 2022-01-09 PROCEDURE — 250N000011 HC RX IP 250 OP 636: Performed by: STUDENT IN AN ORGANIZED HEALTH CARE EDUCATION/TRAINING PROGRAM

## 2022-01-09 PROCEDURE — 83735 ASSAY OF MAGNESIUM: CPT | Performed by: INTERNAL MEDICINE

## 2022-01-09 PROCEDURE — 250N000013 HC RX MED GY IP 250 OP 250 PS 637: Performed by: STUDENT IN AN ORGANIZED HEALTH CARE EDUCATION/TRAINING PROGRAM

## 2022-01-09 PROCEDURE — 250N000011 HC RX IP 250 OP 636: Performed by: INTERNAL MEDICINE

## 2022-01-09 PROCEDURE — 84100 ASSAY OF PHOSPHORUS: CPT | Performed by: INTERNAL MEDICINE

## 2022-01-09 PROCEDURE — 250N000011 HC RX IP 250 OP 636: Performed by: EMERGENCY MEDICINE

## 2022-01-09 RX ORDER — ACETAMINOPHEN 325 MG/10.15ML
500 LIQUID ORAL EVERY 6 HOURS
Status: DISCONTINUED | OUTPATIENT
Start: 2022-01-10 | End: 2022-01-13 | Stop reason: HOSPADM

## 2022-01-09 RX ORDER — DIPHENHYDRAMINE HCL 25 MG
25 CAPSULE ORAL EVERY 12 HOURS
Status: COMPLETED | OUTPATIENT
Start: 2022-01-09 | End: 2022-01-09

## 2022-01-09 RX ORDER — NICOTINE 21 MG/24HR
1 PATCH, TRANSDERMAL 24 HOURS TRANSDERMAL DAILY
Status: DISCONTINUED | OUTPATIENT
Start: 2022-01-09 | End: 2022-01-13 | Stop reason: HOSPADM

## 2022-01-09 RX ORDER — ONDANSETRON 2 MG/ML
8 INJECTION INTRAMUSCULAR; INTRAVENOUS EVERY 8 HOURS PRN
Status: DISCONTINUED | OUTPATIENT
Start: 2022-01-09 | End: 2022-01-13 | Stop reason: HOSPADM

## 2022-01-09 RX ORDER — ONDANSETRON 4 MG/1
8 TABLET, ORALLY DISINTEGRATING ORAL EVERY 8 HOURS PRN
Status: DISCONTINUED | OUTPATIENT
Start: 2022-01-09 | End: 2022-01-13 | Stop reason: HOSPADM

## 2022-01-09 RX ORDER — OXYCODONE HCL 5 MG/5 ML
5-10 SOLUTION, ORAL ORAL 4 TIMES DAILY PRN
Status: DISCONTINUED | OUTPATIENT
Start: 2022-01-09 | End: 2022-01-10

## 2022-01-09 RX ADMIN — CEFAZOLIN SODIUM 2 G: 2 INJECTION, SOLUTION INTRAVENOUS at 06:31

## 2022-01-09 RX ADMIN — DEXTROAMPHETAMINE SACCHARATE, AMPHETAMINE ASPARTATE, DEXTROAMPHETAMINE SULFATE AND AMPHETAMINE SULFATE 20 MG: 2.5; 2.5; 2.5; 2.5 TABLET ORAL at 08:24

## 2022-01-09 RX ADMIN — SODIUM CHLORIDE 1000 ML: 9 INJECTION, SOLUTION INTRAVENOUS at 16:23

## 2022-01-09 RX ADMIN — Medication 1 DROP: at 16:04

## 2022-01-09 RX ADMIN — Medication 1 DROP: at 21:02

## 2022-01-09 RX ADMIN — SUCRALFATE 1 G: 1 SUSPENSION ORAL at 16:20

## 2022-01-09 RX ADMIN — LORAZEPAM 1 MG: 1 TABLET ORAL at 21:02

## 2022-01-09 RX ADMIN — ONDANSETRON 8 MG: 4 TABLET, ORALLY DISINTEGRATING ORAL at 22:03

## 2022-01-09 RX ADMIN — SUCRALFATE 1 G: 1 SUSPENSION ORAL at 11:44

## 2022-01-09 RX ADMIN — Medication 1 DROP: at 04:51

## 2022-01-09 RX ADMIN — CARVEDILOL 6.25 MG: 3.12 TABLET, FILM COATED ORAL at 08:24

## 2022-01-09 RX ADMIN — OXYCODONE HYDROCHLORIDE 10 MG: 5 SOLUTION ORAL at 22:02

## 2022-01-09 RX ADMIN — DIPHENHYDRAMINE HYDROCHLORIDE 25 MG: 25 CAPSULE ORAL at 20:32

## 2022-01-09 RX ADMIN — OXYCODONE HYDROCHLORIDE 10 MG: 5 SOLUTION ORAL at 04:45

## 2022-01-09 RX ADMIN — SUCRALFATE 1 G: 1 SUSPENSION ORAL at 22:02

## 2022-01-09 RX ADMIN — Medication 1 DROP: at 00:46

## 2022-01-09 RX ADMIN — OXYCODONE HYDROCHLORIDE 10 MG: 5 SOLUTION ORAL at 17:17

## 2022-01-09 RX ADMIN — CEFAZOLIN SODIUM 2 G: 2 INJECTION, SOLUTION INTRAVENOUS at 23:17

## 2022-01-09 RX ADMIN — Medication: at 20:37

## 2022-01-09 RX ADMIN — CARVEDILOL 6.25 MG: 3.12 TABLET, FILM COATED ORAL at 17:17

## 2022-01-09 RX ADMIN — CEFAZOLIN SODIUM 2 G: 2 INJECTION, SOLUTION INTRAVENOUS at 14:13

## 2022-01-09 RX ADMIN — VANCOMYCIN HYDROCHLORIDE 2000 MG: 1 INJECTION, POWDER, LYOPHILIZED, FOR SOLUTION INTRAVENOUS at 21:02

## 2022-01-09 RX ADMIN — ONDANSETRON 8 MG: 4 TABLET, ORALLY DISINTEGRATING ORAL at 00:38

## 2022-01-09 RX ADMIN — OXYCODONE HYDROCHLORIDE 10 MG: 5 SOLUTION ORAL at 13:12

## 2022-01-09 RX ADMIN — ONDANSETRON 8 MG: 4 TABLET, ORALLY DISINTEGRATING ORAL at 13:12

## 2022-01-09 RX ADMIN — ACETAMINOPHEN 500 MG: 160 SUSPENSION ORAL at 17:21

## 2022-01-09 RX ADMIN — SUCRALFATE 1 G: 1 SUSPENSION ORAL at 06:30

## 2022-01-09 RX ADMIN — NICOTINE 1 PATCH: 14 PATCH, EXTENDED RELEASE TRANSDERMAL at 16:20

## 2022-01-09 RX ADMIN — Medication 1 DROP: at 06:30

## 2022-01-09 ASSESSMENT — ACTIVITIES OF DAILY LIVING (ADL)
ADLS_ACUITY_SCORE: 9

## 2022-01-09 ASSESSMENT — MIFFLIN-ST. JEOR: SCORE: 1784.13

## 2022-01-09 NOTE — CONSULTS
[facility] ID Service: Consult Note     Patient:  Parker Acevedo, Date of birth 1970, Medical record number [id number]  Date of Visit:  January 9, 2022  Reason for consult: bacteremia         Assessment and Recommendations:     ID Problem list:  1. CLABSI   2. Staph epidermidis bacteremia (oxacilin-susceptible)  3. Staph hominis bacteremia (susceptibilities pending)  4. Short gut syndrome, on TPN  5. History of prior CLABSI    Recs:  1. Could continue IV cefazolin; would suggest also restarting IV vancomycin (dosed by pharmacy) until Staph hominis susceptibilities result  2. Follow up pending blood cultures; would continue to check blood cultures daily until negative x48-72 hrs  3. Would remove CVC as soon as able for source control  4. Follow up TTE        Discussion:  Parker is a 51M with PMH including Celestino-en-Y gastric bypass and esophagojejunostomy complicated by short gut syndrome and chronic malnutrition requiring TPN, as well as several incidents of polymicrobial central line infection resulting in line replacements (previously followed in ID clinic with Dr. Buckner for this), admitted 1/8 under the instruction of Dr. Buckner due to positive blood cultures. Currently all blood cultures (1/6-1/8) positive for Staph epidermidis (MSSE) and Staph hominis (susceptibilites pending) and blood cultures from 1/9 pending. He was initially started on IV vancomycin 1/8 while at home by Dr. Buckner, and then on admission was changed to IV cefazolin (1/8-); would suggest adding back IV vancomycin while Staph hominis susceptibilities pending given his prior Staph hominis bacteremia  (1/2021) which was oxacillin-resistant; follow up blood cultures/susceptibilites to further guide therapy. Otherwise would recommend CVC removal as soon as logistically feasible for source control.        Recs paged to primary team. Thank you for allowing us to participate in the care of this patient. ID will continue to  follow.      Koko Gustafson MD    Infectious Diseases   01/09/2022           History of Present Illness:     Parker is a 51M with PMH including Celestino-en-Y gastric bypass and esophagojejunostomy complicated by short gut syndrome and chronic malnutrition requiring TPN, as well as several incidents of polymicrobial central line infection resulting in line replacements (previously followed in ID clinic with Dr. Buckner for this), admitted 1/8 under the instruction of Dr. Buckner due to positive blood cultures.    Per patient, he had been experiencing ~2 days of fever/chills and sweats while at home. He also noted malaise, headaches, and pain/prurutis associated with erythema around his right chest CVC insertion site. He otherwise reported chronic productive cough, no nausea/vomiting, no diarrhea, and baseline abdominal aches. He said his wife called Dr. Bucknre in ID clinic who recommended to start IV vancomycin and draw blood cultures; then the day he was starting the vancomycin he says he was contacted by the ID clinic and was told to go in to the hospital because the blood cultures had already returned positive.    On admission he was noted to be febrile to 102.9. His cultures have all returned positive for Staph epidermidis (MSSE) and Staph hominis (susceptibilites pending) and he was started on IV cefazolin (1/8-). TTE ordered and pending. ID consulted for further antibiotic recommendations.            Review of Systems:   Full 10 point ROS obtained, pertinent positives and negatives as above.       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     Complication of anesthesia      Difficulty swallowing      Gastric ulcer, unspecified as  acute or chronic, without mention of hemorrhage, perforation, or obstruction      Gastro-oesophageal reflux disease      Head injury      Hiatal hernia      Other bladder disorder      Other chronic pain      PONV (postoperative nausea and vomiting)      Severe malnutrition (H)     TPN     Short gut syndrome      Tobacco abuse      Past Surgical History:   Procedure Laterality Date     AMPUTATION       APPENDECTOMY       BACK SURGERY  11/3/2014    curve in the spine     BIOPSY LYMPH NODE CERVICAL N/A 2/20/2015    Procedure: BIOPSY LYMPH NODE CERVICAL;  Surgeon: Baron Scanlon MD;  Location: [facility]     CHOLECYSTECTOMY       COLONOSCOPY N/A 7/14/2021    Procedure: COLONOSCOPY;  Surgeon: Jimbo Estrada MD;  Location: [facility] OR     DISCECTOMY, FUSION CERVICAL ANTERIOR ONE LEVEL, COMBINED N/A 2/15/2017    Procedure: COMBINED DISCECTOMY, FUSION CERVICAL ANTERIOR ONE LEVEL;  Surgeon: Darren Campos MD;  [facility]     ENDOSCOPIC INSERTION TUBE GASTROSTOMY  9/9/2013    Procedure: ENDOSCOPIC INSERTION TUBE GASTROSTOMY;;  Surgeon: Francis Vyas MD;  [facility]     ENDOSCOPIC ULTRASOUND UPPER GASTROINTESTINAL TRACT (GI)  4/29/2011    Procedure:ENDOSCOPIC ULTRASOUND UPPER GASTROINTESTINAL TRACT (GI); Both Procedures done Conjointly; Surgeon:NEREIDA HOUSER; [facility]     ENDOSCOPIC ULTRASOUND UPPER GASTROINTESTINAL TRACT (GI)  9/9/2013    Procedure: ENDOSCOPIC ULTRASOUND UPPER GASTROINTESTINAL TRACT (GI);  Endoscopic Ultrasound Guide Gastrostomy Tube Placement  C-arm;  Surgeon: Noe Lizarraga MD;  [facility]     ENDOSCOPIC ULTRASOUND UPPER GASTROINTESTINAL TRACT (GI) N/A 2/24/2021    Procedure: ENDOSCOPIC ULTRASOUND, ESOPHAGOSCOPY / UPPER GASTROINTESTINAL TRACT (GI), esophagastrogastroduodenoscopy;  Surgeon: Berny Bach MD;  [facility]     ENDOSCOPY  03/25/11    EGD, [facility]     ENDOSCOPY  08/04/09    Upper Endoscopy, [facility]     ENDOSCOPY   01/05/09    Upper Endoscopy, [facility]     ESOPHAGOSCOPY, GASTROSCOPY, DUODENOSCOPY (EGD), COMBINED  4/20/2011    Procedure:COMBINED ESOPHAGOSCOPY, GASTROSCOPY, DUODENOSCOPY (EGD); Surgeon:BLU VYAS; [facility]     ESOPHAGOSCOPY, GASTROSCOPY, DUODENOSCOPY (EGD), COMBINED  6/15/2011    Procedure:COMBINED ESOPHAGOSCOPY, GASTROSCOPY, DUODENOSCOPY (EGD); Surgeon:BLU VYAS; [facility]     ESOPHAGOSCOPY, GASTROSCOPY, DUODENOSCOPY (EGD), COMBINED  6/12/2013    Procedure: COMBINED ESOPHAGOSCOPY, GASTROSCOPY, DUODENOSCOPY (EGD);;  Surgeon: Blu Vyas MD;  [facility]     ESOPHAGOSCOPY, GASTROSCOPY, DUODENOSCOPY (EGD), COMBINED  11/22/2013    Procedure: COMBINED ESOPHAGOSCOPY, GASTROSCOPY, DUODENOSCOPY (EGD);;  Surgeon: Blu Vyas MD;  [facility]     ESOPHAGOSCOPY, GASTROSCOPY, DUODENOSCOPY (EGD), COMBINED  4/30/2014    Procedure: COMBINED ESOPHAGOSCOPY, GASTROSCOPY, DUODENOSCOPY (EGD);  Surgeon: Bul Vyas MD;  [facility]     ESOPHAGOSCOPY, GASTROSCOPY, DUODENOSCOPY (EGD), COMBINED N/A 2/20/2015    Procedure: COMBINED ESOPHAGOSCOPY, GASTROSCOPY, DUODENOSCOPY (EGD), BIOPSY SINGLE OR MULTIPLE;  Surgeon: Baron Scanlon MD;  Location:  OR     ESOPHAGOSCOPY, GASTROSCOPY, DUODENOSCOPY (EGD), COMBINED N/A 9/30/2015    Procedure: COMBINED ESOPHAGOSCOPY, GASTROSCOPY, DUODENOSCOPY (EGD);  Surgeon: Blu Vyas MD;  [facility]     ESOPHAGOSCOPY, GASTROSCOPY, DUODENOSCOPY (EGD), COMBINED N/A 10/3/2019    Procedure: Upper Endoscopy;  Surgeon: Clif Morrow MD;  [facility]     GASTRECTOMY  6/22/2012    Procedure: GASTRECTOMY;  Open Approach, Excise Ulcers,Partial Gastrectomy, Esophagojejunostomy, Hiatal Hernia Repair, Extensive Lysis of Adhesions and Esaphagogastrodudenoscopy.;  Surgeon: Blu Vyas MD;  [facility]     GASTROJEJUNOSTOMY  08/26/09    Extensice enterolysis, partial resect. jejunum, part.  resect gastric pouch, gastrojejunostomy anastomosis     HC ESOPH/GAS REFLUX TEST W NASAL IMPED ELECTRODE  8/5/2013    Procedure: ESOPHAGEAL IMPEDENCE FUNCTION TEST 1 HOUR OR LESS;  Surgeon: Halie Lang MD;  [facility]     HEAD & NECK SURGERY  2/15/2017    C5-C6     HERNIA REPAIR  2006    Umbilical hernia     HERNIORRHAPHY HIATAL  6/22/2012    Procedure: HERNIORRHAPHY HIATAL;;  Surgeon: Francis Vyas MD;  [facility]     HERNIORRHAPHY INGUINAL  11/22/2013    Procedure: HERNIORRHAPHY INGUINAL;;  Surgeon: Francis Vyas MD;  [facility]     INSERT PICC LINE Right 12/19/2019    Procedure: Picc Placement;  Surgeon: Per Dumont PA-C;  [facility]     INSERT PICC LINE Right 2/21/2020    Procedure: INSERTION, PICC;  Surgeon: Per Dumont PA-C;  [facility]     INSERT PORT VASCULAR ACCESS Right 12/19/2017    Procedure: INSERT PORT VASCULAR ACCESS;  Right Chest Port Placement ;  Surgeon: Lisandro Alejandro PA-C;  [facility]     INSERT PORT VASCULAR ACCESS Right 8/2/2018    Procedure: INSERT PORT VASCULAR ACCESS;  Place single lumen tunneled central venous access catheter;  Surgeon: Guy Jamil PA-C;  [facility]     IR CVC TUNNEL PLACEMENT > 5 YRS OF AGE  8/7/2019     IR CVC TUNNEL PLACEMENT > 5 YRS OF AGE  4/14/2020     IR CVC TUNNEL PLACEMENT > 5 YRS OF AGE  8/3/2020     IR CVC TUNNEL PLACEMENT > 5 YRS OF AGE  9/4/2020     IR CVC TUNNEL PLACEMENT > 5 YRS OF AGE  2/5/2021     IR CVC TUNNEL PLACEMENT > 5 YRS OF AGE  3/23/2021     IR CVC TUNNEL REMOVAL RIGHT  10/1/2019     IR CVC TUNNEL REMOVAL RIGHT  7/30/2020     IR CVC TUNNEL REMOVAL RIGHT  9/2/2020     IR CVC TUNNEL REMOVAL RIGHT  2/3/2021     IR CVC TUNNEL REMOVAL RIGHT  3/19/2021     IR CVC TUNNEL REVISION RIGHT  5/7/2021     IR FOLLOW UP VISIT OUTPATIENT  8/7/2019     IR PICC PLACEMENT > 5 YRS OF AGE  3/7/2019     IR PICC PLACEMENT > 5 YRS OF AGE  12/19/2019     IR PICC PLACEMENT > 5  YRS OF AGE  2/21/2020     LAPAROTOMY EXPLORATORY  11/22/2013    Procedure: LAPAROTOMY EXPLORATORY;  Exploratory Laparotomy, Upper Endoscopy, Left Inguinal Hernia Repair;  Surgeon: Francis Vyas MD;  [facility]     ORTHOPEDIC SURGERY       PICC INSERTION Right 03/16/2017    5fr DL BioFlo PICC, 42cm (3cm external) in the R medial brachial vein w/ tip in the SVC RA junction.     PICC INSERTION Left 09/23/2017    5fr DL BioFlo PICC, 45cm (1cm external) in the L basilic vein w/ tip in the SVC RA junction.     PICC INSERTION Right 05/16/2019    5Fr - 43cm, Medial brachial vein, low SVC     PICC INSERTION Right 10/02/2019    5Fr - 43cm (2cm external), basilic vein, low SVC     SHAYLEE EN Y BOWEL  2003     SOFT TISSUE SURGERY       THORACIC SURGERY       TONSILLECTOMY       TRANSESOPHAGEAL ECHOCARDIOGRAM INTRAOPERATIVE N/A 1/8/2019    Procedure: TRANSESOPHAGEAL ECHOCARDIOGRAM INTRAOPERATIVE;  Surgeon: GENERIC ANESTHESIA PROVIDER;  [facility]     ZZC GASTRIC BYPASS,OBESE<100CM SHAYLEE-EN-Y  2002    lost 300 pounds     Otherwise as per HPI      Allergies:      Allergies   Allergen Reactions     Bactrim [Sulfamethoxazole W/Trimethoprim] Rash     Penicillins Anaphylaxis     Please see Antimicrobial Management Team allergy assessment note 10/10/2018. Patient reported tolerating amoxicillin.     Doxycycline Rash     Vancomycin Rash     Rash after receiving vancomycin 3/28/16 (infusion reaction?). Tolerated with slower infusion and diphenhydramine premed.            Current Antimicrobials:     IV cefazolin 2g q8hr       Family History:   Reviewed and noncontributory       Social History:     Social History     Tobacco Use     Smoking status: Current Some Day Smoker     Types: Cigarettes     Smokeless tobacco: Never Used     Tobacco comment: 2/4/2021    smokes 3 cigarettes/day   Substance Use Topics     Alcohol use: No     Comment: quit 2002     Drug use: No     Otherwise as per HPI         Physical Exam:   Ranges  forvital signs:  Temp:  [99.2  F (37.3  C)-102.9  F (39.4  C)] 99.2  F (37.3  C)  Pulse:  [] 87  Resp:  [17-19] 17  BP: (103-143)/() 120/71  SpO2:  [94 %-99 %] 97 %  GENERAL:  sitting in bed in no acute distress.   ENT:  Head is normocephalic, atraumatic. Oropharynx is moist.  EYES:  Eyes have anicteric sclerae.   LUNGS:  Unlabored breathing on room air.  CARDIOVASCULAR:  Regular rate and rhythm.  ABDOMEN:  Non-distended, soft.  EXT: Extremities warm.  MSK/SKIN:  +right chest CVC with surrounding erythema, mild tenderness; scattered excoriations and scabs on extremities   NEUROLOGIC:  Awake, alert, interactive         Laboratory Data:     Inflammatory Markers    Recent Labs   Lab Test 01/08/22  1430 11/16/21  1300 08/24/21  0855 07/13/21  1110 06/29/21  1016 06/14/21  1017 06/09/21  1044 03/25/21  1355 11/17/20  1445 07/28/20  1607 06/28/19  1345 05/14/19  1145 02/12/18  1400 01/23/18  0845 01/20/18  1030 10/02/17  1030 09/24/17  1900 06/29/17  1009 06/28/17  2222 03/12/17  0351 02/10/17  1520 11/01/16  1530   SED  --   --   --   --   --   --   --   --   --  10  --  13  --  10 11  --  31*  --  26* 17*  --  27*   CRP 52.0* <2.9 <2.9 7.8 <2.9 <2.9 <2.9 9.5*   < > <2.9   < >  --    < > <2.9 5.4   < > 26.6*   < > 53.0* 3.4   < > 8.4*    < > = values in this interval not displayed.       Hematology Studies    Recent Labs   Lab Test 01/09/22  0558 01/08/22  1828 01/08/22  1430 01/04/22  1150 12/29/21  1115 12/14/21  1250 07/13/21  1110 06/29/21  1016 06/14/21  1017 06/09/21  1044 06/01/21  1117 05/25/21  1147 05/19/21  1342 05/18/21  1015   WBC 12.5* 11.6* 4.6 8.2 10.0 6.2   < > 6.9 8.1 7.5 7.3   < > 6.1 6.2   ANEU  --   --   --   --   --   --   --  3.1 4.1 4.7 4.3  --  3.5 3.7   AEOS  --   --   --   --   --   --   --  0.2 0.2 0.3 0.1  --  0.2 0.2   HGB 9.8* 10.5* 11.3* 11.0* 12.6* 11.4*   < > 12.1* 12.0* 13.3 12.1*   < > 12.5* 12.6*   MCV 97 94 97 98 94 95   < > 96 97 96 94   < > 97 98   * 127* 145*  156 202 161   < > 178 158 169 174   < > 159 145*    < > = values in this interval not displayed.       Metabolic Studies     Recent Labs   Lab Test 01/09/22  0558 01/08/22  1828 01/08/22  1430 01/04/22  1150 12/29/21  1115    139 141 141 142   POTASSIUM 3.9 3.0* 3.9 4.0 4.4   CHLORIDE 110* 106 108 111* 108   CO2 26 27 29 28 29   BUN 14 9 10 20 19   CR 0.66 0.69 0.69 0.76 0.62*   GFRESTIMATED >90 >90 >90 >90 >90       Hepatic Studies    Recent Labs   Lab Test 01/09/22  0558 01/08/22  1828 01/08/22  1430 01/04/22  1150 12/29/21  1115 12/14/21  1250   BILITOTAL 0.5 0.6 0.5 0.3 0.7  0.7 0.3   ALKPHOS 60 68 76 81 67 64   ALBUMIN 2.5* 2.7* 3.2* 3.0* 3.0* 2.8*   AST 11 16 21 21 20 13   ALT 28 31 37 50 39 19       Microbiology:  Culture   Date Value Ref Range Status   01/08/2022 Positive on the 1st day of incubation (A)  Preliminary   01/08/2022 Gram positive cocci in clusters (AA)  Preliminary     Comment:     1 of 2 bottles   01/08/2022 Positive on the 1st day of incubation (A)  Preliminary   01/08/2022 Gram positive cocci in clusters (AA)  Preliminary     Comment:     2 of 2 bottles   01/06/2022 Positive on the 1st day of incubation (A)  Preliminary   01/06/2022 Staphylococcus epidermidis (AA)  Preliminary     Comment:     2 of 2 bottles   01/06/2022 Staphylococcus hominis (AA)  Preliminary     Comment:     1 of 2 bottles   01/06/2022 Positive on the 1st day of incubation (A)  Preliminary   01/06/2022 Staphylococcus hominis (AA)  Preliminary     Comment:     2 of 2 bottles  Susceptibilities done on previous cultures   01/06/2022 Staphylococcus epidermidis (AA)  Preliminary     Comment:     2 of 2 bottles  Susceptibilities done on previous cultures   09/01/2021 No Growth  Final   09/01/2021 No Growth  Final   09/01/2021 No Growth  Final   08/16/2021 No Growth  Final   08/16/2021 No Growth  Final   08/16/2021 No Growth  Final   08/14/2021 No Growth  Final   08/14/2021 Positive on the 1st day of incubation (A)  Final    2021 Pseudomonas oryzihabitans (AA)  Final   2021 Pseudomonas oryzihabitans (AA)  Final     Comment:     1 of 2 bottles  Strain 2   08/10/2021 No Growth  Final   08/10/2021 No Growth  Final   08/10/2021 Positive on the 1st day of incubation (A)  Final   08/10/2021 Staphylococcus epidermidis (AA)  Final     Comment:     1 of 2 bottles       Urine Studies    Recent Labs   Lab Test 21  1644 20  1905 19  0025 10/04/19  1344 19  1625   LEUKEST Negative Negative Negative Trace* Negative   WBCU 1 <1 <1 1 0              Imagin/8/21 CXR:   IMPRESSION:   1.  Hazy right basilar opacities, atelectasis versus infection.  2.  Trace bilateral pleural effusions

## 2022-01-09 NOTE — PLAN OF CARE
Pt arrived to unit 7D at 1830 from ED accompanied by his wife Rose. ED nurse informed writer after pt arrived to 7D that she suspected the patient might be using recreational drugs. She also stated the pt was aggressive with her while in the ED. Urine tox screen collected. Pt has been A/Ox4, non-aggressive. Pt slurs words and has word-finding difficulty at times. T-max 100.1. 1L bolus LR given. Cycled TPN started through Cm line. G-tube port used by pt for venting only. Voiding adequately. RVP sample collected, results pending. Fentanyl patch on L arm. Oxycodone given x1 for abdominal pain. PO ativan given x1 for anxiety. Continue with POC.

## 2022-01-09 NOTE — PROGRESS NOTES
"CLINICAL NUTRITION SERVICES - ASSESSMENT NOTE     Nutrition Prescription    RECOMMENDATIONS FOR MDs/PROVIDERS TO ORDER:  Order additional IVFs for maintenance, as needed.     Malnutrition Status:    Unable to determine due to pt was not in his room when attempting to see    Recommendations already ordered by Registered Dietitian (RD):  Oral supplement with Gatorade    Future/Additional Recommendations:  1. Diet order as per team.   2. Continue parenteral nutrition regimen. Monitor weight trends, status of potential malnutrition,, and potential need to adjust regimen.   3. Peripheral parenteral nutrition (PPN) recommendations if needed: Start PPN with Clinimix peripheral formula @ 100 ml/hr + 250 ml intralipid daily: 120 grams dex (408 kcal), 102 grams protein (408 kcal) + 250 ml IV lipids daily which provides 1316 kcals, 1.3g/kg protein  4. Due to long term parenteral nutrition (PN) reliance, recommend transitioning to SMOF lipid in outpatient setting to allow for more calories from fat with less concern for liver function/triglyceride abnormality.   5. Monitor lytes and replace as needed.    6. Monitor BG control. BG was 114 on 1/9/2022.   7. Continue to manage nausea. Monitor GI sx, including stooling.  8. Consider checking the following labs: Thiamine (145, WNL on 1/6/19), iron (ferritin of 14, low on 11/16/21), vitamin D (19, low on 1/6/19), vitamin B12 (517, WNL on 1/6/19). Check vitamin A (0.20, low on 1/6/19). Consider checking folate.      REASON FOR ASSESSMENT  Parker Richbriannavignesh is a/an 51 year old male assessed by the dietitian for Pharmacy/Nutrition to Start and Manage PN  (Continuation of home TPN) and Provider Order - \"assistn in management of home tpn and diet\"    NUTRITION/ADDITIONAL HISTORY  Per RD note 3/21/21: \"Home TPN regimen: 875-1000 ml daily (14 hr cycle), Dex 150 grams,  grams + 125 ml IV lipids 3x/week -> provides an average of 1017 kcal (13 kcal/kg), 1.3g/kg protein, 11% kcal from IV " "lipid. TPN regimen meets ~50% kcal needs, 100% pro needs. Contains 20 mg famotidine. He accidentally cut his catheter, and has not received TPN over the last ~7 days.Patient reports having reduced PO intake from November 2020 to February 2021 d/t not having a G-tube for drainage and therefore having nausea/abdominal discomfort with eating.  He also had COVID in November which affected his taste/smell and ability to eat.  He notes that sometimes his TPN causes him to feel nauseated or have reflux. His struggles with reflux, nausea/vomiting and dumping syndrome associated with eating.  He reports that there isn't a single food he can always tolerate, noting some days a certain food will make him vomit and then randomly he will be able to tolerate it.  Liquids generally are better tolerated than solid foods.  He sometimes sips on Boost and Gatorade. Overall, it seems like he tolerates very little nutrition orally at baseline. He notes has had too many peripheral lines and getting access is difficult for him. \"    Per H & P, \"History of recurrent bacteremia/line infections, history of Celestino-en-Y gastric bypass [per chart, top wt of 492], esophagojejunostomy complicated by short gut syndrome, severe malnutrition on TPN, HTN, ADHD, GERD, asthma. He is admitted for Staphylococcus epidermidis bacteremia, concerning for line infection. Right Cm catheter for TPN. G tube. Nausea, chronic. Of note, he has a complicated abdominal surgery history. He has been on TPN for a long time. Also has a G tube for 'venting.' Will likely need to have Cm catheter removed during this admission for source control of Staphylococcus epidermidis bacteremia, as above. He normally gets TPN as well as fluids (~1-2L LR daily) through the Cm. Chronic normocytic anemia, stable. No changes in bowel movements. Has nausea which is normal for him, has been a little more nauseous now; has Zofran at home which is helpful. No recent changes in " "appetite.\" G-tube replaced 10/2021 (at that time, pt reported having his G-tube for about five months). H/o vitamin D, thiamine,  iron, and vitamin B12 deficiencies.Lives with his family. Pt was ordered to take, noting, dulcolax, magnesium citrate, zofran, protonix, miralax, carafate, miralax, and vitamin D PTA.     Home PN regimen per Pharmacy (1/9/2022) as follows:  -Plain bag 4x/week TuThSaSun: Wt 81 kg, Prosol 20% 100 gm/d, Dextrose 175 gm/d, Volume 1250 ml/d, Intralipid 20% zero; Lytes per DAY:  Na 50 meq/d, K 60 meq/d, Ca 5.5 meq/d, Mag 6 meq/d, Phos 8 mmol/d, 33% chloride, Infuvite 10 ml/day, Tralement 1 ml/day, famotidine 20 mg/d; (12 hours; 100 ml OF)  -Lipid bag MWF: Wt 81 kg, Prosol 20% 100 gm/d , Dextrose 175 gm/d, Volume 1000 ml/d,  Intralipid 20% 250ml; Lytes per DAY:  Na 50 meq/d, K 60 meq/d, Ca 5.5 meq/d, Mag 6 meq/d, Phos 8 mmol/d, 33% chloride, Infuvite 10 ml/day, Tralement1 ml/day, Famotidine 20 mg/d  (12hrs; 100 ml OF)    Unable to obtain nutrition hx from pt as pt was not in his room when attempting to see.     CURRENT NUTRITION ORDERS  Diet: Regular since 1/8  Intake/Tolerance: Pt was just recently admitted, no data so far. No meals ordered so far this admission, per [facility].     Nutrition Support: Initiated this admission on 1/8/2022, 1254 mL daily (cycled over 12 hours) central parenteral nutrition (CPN) of 174 g dextrose, 100 g AA and 250 mL of 20% IV lipids three times weekly which provides 1206 kcals (13 kcals/kg), 100 g AA (1.1 g protein/kg), 174 g CHO, GIR of 3.3 (at bulk rate), and 18% of kcals from fat daily on average. Ordered to receive 1 mL trace minerals and 10 mL infuvite. CPN regimen meets 100% of protein needs and about 54% of kcals needs. Reliant on parenteral nutrition to help meet 100% of nutrition needs.     LABS  Labs reviewed    MEDICATIONS  Medications reviewed    ANTHROPOMETRICS  Height: 180.3 cm (5' 11\")   Admit wt: 89.8 kg (1/8/2022)  Most Recent Weight: 90.6 kg " (199 lb 11.2 oz)    IBW: 78.2 kg   BMI: Overweight BMI 25-29.9  Weight History: 73.8 kg (9/3/15), 87.1 kg (11/9/20), 87.1 kg (3/23/21), 92 kg (7/8/21), 87 kg (10/14/21) - No significant/severe wt loss although variability in weight.   Dosing Weight: 90 kg (based on lowest wt so far this admission of 89.7 kg on 1/9/2022)    ASSESSED NUTRITION NEEDS (for inpatient hospital stay)  Estimated Energy Needs: 9393-6029 kcals/day (25 - 30 kcals/kg)  Justification: Maintenance needs (low end rec with parenteral nutrition support)  Estimated Protein Needs: + grams protein/day (1 - 1.2+ grams of pro/kg)  Justification: Increased needs  Estimated Fluid Needs: 7402-2290 mL/day (25 - 30 mL/kg)   Justification: Maintenance needs or per team, pending fluid status    PHYSICAL FINDINGS/OTHER FINDINGS  See malnutrition section below.  GI: Constipation noted per RN 1/9. Nausea    MALNUTRITION  % Intake: Unable to assess as pt was not in his room when attempting to see  % Weight Loss: Weight loss does not meet criteria.  Subcutaneous Fat Loss: Unable to assess as pt was not in his room when attempting to see  Muscle Loss: Unable to assess as pt was not in his room when attempting to see  Fluid Accumulation/Edema: Not meeting this criteria.     Malnutrition Diagnosis: Unable to determine due to pt was not in his room when attempting to see    NUTRITION DIAGNOSIS  Altered GI function related to short gut syndrome as evidenced by reliant on parenteral nutrition to help meet 100% of nutrition needs.     INTERVENTIONS  Implementation  Nutrition Education: Per provider order if indicated.  Medical food supplement therapy: Ordered Ensure Enlive (amie/strawberry) at 10:00 and 14:00 similar to a prior admit with a Powerade/Gatorade (8 oz) at each of these snack times as well.  Collaboration with other providers - Obtained CPN formula via Pharmacy.    Goals  Total avg nutritional intake to meet a minimum of 25 kcal/kg and 1 g PRO/kg daily  (per dosing wt 90 kg).     Monitoring/Evaluation  Progress toward goals will be monitored and evaluated per protocol.       Nutrition will continue to follow.      Zully Prasad MS, RD, LD, CNSC   Saturday/Sunday Pgr: [phone]      7D RD pager: [phone]

## 2022-01-09 NOTE — PROVIDER NOTIFICATION
Provider notification:    Dr. Willett notified at 1450 Via web paging system.    Reason for notification:  1. Pt asking for nicotine patch. 2. Also pt wondering if LR bolus can be switched to NS? 3. Would he benefit from lymphedema/PT consult for BLE edema? Thanks!

## 2022-01-09 NOTE — PLAN OF CARE
[id number]    VSS on RA. Afebrile, tmax 100. Pt refused continuous pulse ox monitoring overnight. Stating 95% and above when spot checked. Denies SOB. Nausea managed with prn zofran x1. C/o pain, gave oxycodone x1 with some relief. C/o dry eyes, refresh eye drops given x3. Tube feeds infusing. No acute events overnight. Continue with plan of care.

## 2022-01-09 NOTE — CONSULTS
Care Management Initial Consult    General Information  Assessment completed with: VM-chart review (unable to reach pt via phone),    Type of CM/SW Visit: Initial Assessment    Primary Care Provider verified and updated as needed: Yes   Readmission within the last 30 days:        Reason for Consult: care coordination/care conference,discharge planning  Advance Care Planning:            Communication Assessment  Patient's communication style: spoken language (English or Bilingual)    Hearing Difficulty or Deaf: yes   Wear Glasses or Blind: no    Cognitive  Cognitive/Neuro/Behavioral: WDL  Level of Consciousness: alert  Arousal Level: opens eyes spontaneously  Orientation: oriented x 4  Mood/Behavior: calm,cooperative  Best Language: 0 - No aphasia  Speech: clear,logical    Living Environment:   People in home: spouse     Current living Arrangements:        Able to return to prior arrangements:         Family/Social Support:  Care provided by:    Provides care for:                  Description of Support System:           Current Resources:   Patient receiving home care services: Yes  Skilled Home Care Services: Skilled Nursing  Community Resources: Home Care,Home Infusion  Equipment currently used at home: none  Supplies currently used at home: Other    Employment/Financial:  Employment Status:          Financial Concerns:             Lifestyle & Psychosocial Needs:  Social Determinants of Health     Tobacco Use: High Risk     Smoking Tobacco Use: Current Some Day Smoker     Smokeless Tobacco Use: Never Used   Alcohol Use: Not At Risk     Frequency of Alcohol Consumption: Never     Average Number of Drinks: Patient refused     Frequency of Binge Drinking: Never   Financial Resource Strain: Low Risk      Difficulty of Paying Living Expenses: Not hard at all   Food Insecurity: No Food Insecurity     Worried About Running Out of Food in the Last Year: Never true     Ran Out of Food in the Last Year: Never true    Transportation Needs: No Transportation Needs     Lack of Transportation (Medical): No     Lack of Transportation (Non-Medical): No   Physical Activity: Not on file   Stress: Not on file   Social Connections: Not on file   Intimate Partner Violence: Not At Risk     Fear of Current or Ex-Partner: No     Emotionally Abused: No     Physically Abused: No     Sexually Abused: No   Depression: Not at risk     PHQ-2 Score: 0   Housing Stability: Not on file                 Additional Information:  Pt admitted with Staphylococcus epidermidis bacteremia, concerning for line infection.   Pt with medical history significant for recurrent bacteremia/line infections, history of Celestino-en-Y gastric bypass, esophagojejunostomy complicated by short gut syndrome, severe malnutrition on TPN, HTN, ADHD, GERD, asthma.  ID and nutrition consulted.  Per chart review pt is open to [facility] for home RN and [facility] for TPN as well as IVF's.  Pt has g-tube for venting. Unable to reach pt via phone to complete initial assessment so completed via chart review.  CMA d/t elevated readmission risk score.  Will defer to primary RNCC/SW for on going management. Resumption of home care and home infusion orders placed.     Outpatient Services:    [facility] (chronic TPN)  Phone  [phone]  Fax  [phone]     [facility] (RN)  Phone: [phone]  Fax: [phone]     Genesis Rivas, RN BSN, PHN, ACM-RN    To contact the weekend RNCC, Page: [phone]    1/9/2022 2:42 PM

## 2022-01-09 NOTE — PROVIDER NOTIFICATION
Provider notification:    Dr. Willett notified at 1205 Via web paging system.    Reason for notification:  Positive Blood culture collected 1/8/2022 from right hand, gram + cocci in clusters.

## 2022-01-09 NOTE — PROVIDER NOTIFICATION
"Provider Notification      Ab Medeiros ([id number]) paged at 0150:  \"EUSEBIA RN was notified by lab that patient had a positive blood culture for gram positive cocci in clusters that was drawn 1/8/22 at 1430. Thanks.\"   "

## 2022-01-09 NOTE — PROVIDER NOTIFICATION
Dr. Wren paged:     ED nurse informed me just now she suspects patient might be using drugs through central line. Pt's behavior is odd. Wondering if you want to do room search/urine tox screen?    Urine tox screen collected and in process.

## 2022-01-09 NOTE — PROGRESS NOTES
[facility]    Progress Note - [place] [facility] Service        Date of Admission:  1/8/2022    Assessment & Plan   Parker Acevedo is a 51 year old male admitted on 1/8/2022. He has a history of recurrent bacteremia/line infections, history of Celestino-en-Y gastric bypass, esophagojejunostomy complicated by short gut syndrome, severe malnutrition on TPN, HTN, ADHD, GERD, asthma. He is admitted for Staphylococcus epidermidis bacteremia, concerning for line infection.      Changes Today:  -- ID consult  -- IR consult for tomorrow for Cm line removal  -- TTE to screen for possible vegetations/endocarditis  -- Administer 1L IVF bolus  -- Schedule tylenol 500mg q4h  -- Increase frequency of oxycodone from 5-10mg TID PRN (PTA dosing) to QID PRN     Staphylococcus epidermidis bacteremia in the setting of recurrent line infections   Recent history of fevers/chills, malaise, headaches, and myalgias since 1/4/22, for which blood cultures were obtained (1/6/22) from Cm and peripheral that both grew S. Epi; Verigene negative for mecA gene. Repeat blood cultures from admission (1/8/22) also now positive for GPC in clusters. Likely source is Cm catheter. As for other etiologies, he does not have a history of endocarditis/mechanical valve/pacemaker, no history of joint replacement. Patient currently being managed on cefazolin with initial improvement in symptoms, currently awaiting ID consult, Cm line removal for definitive source control, and TTE to screen for endocarditis  - ID consulted  - cefazolin 2g q8h (1/8 - P) (disucssed antibiotic choice with pharmacy)  - continue pta acetaminophen 500mg q4h prn  - TTE (ordered)   - Obtain daily blood cultures from Cm/peripheral  - IR consult for 1/10/22 for Cm line removal     Complex GI surgery history   Right Cm catheter for TPN  G tube  Nausea, chronic  Likely plan to have Cm catheter removed during this  admission for source control of Staphylococcus epidermidis bacteremia, as above. He normally gets TPN as well as fluids (~1-2L LR daily) through the Cm.   - TPN, per RD  - continue pta ondansetron 8mg q8h prn  - continue pta sucralfate 1g qid prn  - continue pta nystatin cream (for G tube site)  - Administer 1L NS bolus per day PRN    Headaches + myalgias amidst underlying bacteremia  H/o chronic pain  Patient endorsing persistent presence of poorly controlled headache and myalgias since admission  - Schedule tylenol 500mg q4h  - continue pta fentanyl patch q48 hours  - Increase frequency of oxycodone from 5-10mg TID PRN (PTA dosing) to QID PRN    Chronic/stable  #Chronic normocytic anemia, stable  Baseline hgb ~10-12.  - continue to monitor    #Anxiety  - continue pta lorazepam 1mg daily prn     #ADHD  - continue pta Adderall 20mg daily     #HTN  - continue pta carvedilol 6.25mg bid      #Asthma  - continue pta albuterol prn     #GERD  - continue pta pantoprazole 40mg daily     Diet: Combination Diet Regular Diet Adult  parenteral nutrition - ADULT compounded formula CYCLE  Snacks/Supplements Adult: Other; Ensure Enlive (amie/strawberry) @ 10 and 2.  Please also send Powerade/Gatorade (8 oz) at each of these snack times as well.; Between Meals  parenteral nutrition - ADULT compounded formula CYCLE    DVT Prophylaxis: Pneumatic Compression Devices  Sanchez Catheter: Not present  Fluids: 1L NS bolus, per above  Central Lines: PRESENT  CVC Double Lumen Right Internal jugular Tunneled-Site Assessment: WDL  Code Status: Full Code      Disposition Plan   Expected Discharge: >2 days, pending ID consult, removal of Cm, and coordination of abx plan  Anticipated discharge location:  Awaiting care coordination huddle     The patient's care was discussed with the Attending Physician, Dr. Dias.    Hector Willett MD  [address]  Securely message with  the [employer] Web Console (learn more here)  Text page via [facility] Paging/Directory    Please see sign in/sign out for up to date coverage information    Clinically Significant Risk Factors Present on Admission             # Overweight: last Body mass index is 27.58 kg/m .      ______________________________________________________________________    Interval History   Nursing notes reviewed. No acute events overnight. Patient endorsing that he feels somewhat better today, including some initial improvement in fevers, energy level, and muscle aches. Patient continues to notice diffuse headaches; no associated presence of new confusion, N/V, focal sensorimotor deficits, or neck stiffness. Patient endorses that he feels somewhat dehydrated today.     ROS is negative for new abdominal pain, CP, or SOB.    Data reviewed today: I reviewed all medications, new labs and imaging results over the last 24 hours.    Physical Exam   Vital Signs: Temp: 99.2  F (37.3  C) Temp src: Oral BP: 120/71 Pulse: 87   Resp: 17 SpO2: 97 % O2 Device: None (Room air)    Weight: 197 lbs 12.04 oz  General Appearance: Resting comfortably in bed, NAD  Respiratory: Breathing comfortably on RA, lungs are relatively CTAB  Cardiovascular: RRR, normal S1 and S2, no M/R/G  Chest: Cm site appears clean/dry; no surrounding erythema or areas of fluctuance.  GI: Soft, NT, ND. G-tube site appears clean/dry.  Extremities: Presence of trace-1+ edema in BLE    Data   Recent Labs   Lab 01/09/22  1307 01/09/22  0558 01/09/22  0546 01/09/22  0034 01/08/22  1828 01/08/22  1430   WBC  --  12.5*  --   --  11.6* 4.6   HGB  --  9.8*  --   --  10.5* 11.3*   MCV  --  97  --   --  94 97   PLT  --  130*  --   --  127* 145*   INR  --  1.27*  --   --   --  1.07   NA  --  140  --   --  139 141   POTASSIUM  --  3.9  --   --  3.0* 3.9   CHLORIDE  --  110*  --   --  106 108   CO2  --  26  --   --  27 29   BUN  --  14  --   --  9 10   CR  --  0.66  --   --  0.69 0.69   ANIONGAP   --  4  --   --  6 4   SILAS  --  8.8  --   --  8.2* 8.6   GLC 95 114* 74   < > 115* 96   ALBUMIN  --  2.5*  --   --  2.7* 3.2*   PROTTOTAL  --  6.0*  --   --  6.1* 7.0   BILITOTAL  --  0.5  --   --  0.6 0.5   ALKPHOS  --  60  --   --  68 76   ALT  --  28  --   --  31 37   AST  --  11  --   --  16 21    < > = values in this interval not displayed.

## 2022-01-09 NOTE — PLAN OF CARE
"[id number]    Blood pressure 120/71, pulse 87, temperature 99.2  F (37.3  C), temperature source Oral, resp. rate 17, height 1.803 m (5' 11\"), weight 89.7 kg (197 lb 12 oz), SpO2 97 %.    VSS on RA. Tmax 99.2F. PRN oxycodone x1 for abdominal pain. Zofran x1 for nausea, effective. Cyclic TPN, currently off. Needs post-void bladder scan to r/o retention. Fentanyl patch on L arm. Double lumen Cm patent, red lumen is saline locked and purple lumen infusing.    Continue with POC.  "

## 2022-01-09 NOTE — PROVIDER NOTIFICATION
"Provider Notification     Ab Medeiros ([id number]) paged at 0100:  \"Hi, pt is stating upper 90s on RA and requesting that his continuous pulse ox be discontinued. Are we able to discontinue this? Also, pt is requesting IV zofran and only has the ODT ordered. Thanks.\"       Response: Team still wants patient on continuous pulse ox monitoring due to bactermia. IV zofran ordered.      Will continue to monitor.   "

## 2022-01-10 ENCOUNTER — PATIENT OUTREACH (OUTPATIENT)
Dept: CARE COORDINATION | Facility: CLINIC | Age: 52
End: 2022-01-10
Payer: COMMERCIAL

## 2022-01-10 ENCOUNTER — APPOINTMENT (OUTPATIENT)
Dept: INTERVENTIONAL RADIOLOGY/VASCULAR | Facility: CLINIC | Age: 52
DRG: 314 | End: 2022-01-10
Attending: PHYSICIAN ASSISTANT
Payer: COMMERCIAL

## 2022-01-10 LAB
ALBUMIN SERPL-MCNC: 2.6 G/DL (ref 3.4–5)
ALP SERPL-CCNC: 58 U/L (ref 40–150)
ALT SERPL W P-5'-P-CCNC: 22 U/L (ref 0–70)
ANION GAP SERPL CALCULATED.3IONS-SCNC: 2 MMOL/L (ref 3–14)
ANION GAP SERPL CALCULATED.3IONS-SCNC: 5 MMOL/L (ref 3–14)
AST SERPL W P-5'-P-CCNC: 11 U/L (ref 0–45)
BACTERIA BLD CULT: ABNORMAL
BASOPHILS # BLD AUTO: 0 10E3/UL (ref 0–0.2)
BASOPHILS NFR BLD AUTO: 1 %
BILIRUB SERPL-MCNC: 0.2 MG/DL (ref 0.2–1.3)
BUN SERPL-MCNC: 12 MG/DL (ref 7–30)
BUN SERPL-MCNC: 18 MG/DL (ref 7–30)
CALCIUM SERPL-MCNC: 8.2 MG/DL (ref 8.5–10.1)
CALCIUM SERPL-MCNC: 8.4 MG/DL (ref 8.5–10.1)
CHLORIDE BLD-SCNC: 110 MMOL/L (ref 94–109)
CHLORIDE BLD-SCNC: 111 MMOL/L (ref 94–109)
CO2 SERPL-SCNC: 28 MMOL/L (ref 20–32)
CO2 SERPL-SCNC: 28 MMOL/L (ref 20–32)
CREAT SERPL-MCNC: 0.69 MG/DL (ref 0.66–1.25)
CREAT SERPL-MCNC: 0.72 MG/DL (ref 0.66–1.25)
EOSINOPHIL # BLD AUTO: 0.2 10E3/UL (ref 0–0.7)
EOSINOPHIL NFR BLD AUTO: 3 %
ERYTHROCYTE [DISTWIDTH] IN BLOOD BY AUTOMATED COUNT: 15.4 % (ref 10–15)
ERYTHROCYTE [DISTWIDTH] IN BLOOD BY AUTOMATED COUNT: 15.6 % (ref 10–15)
GFR SERPL CREATININE-BSD FRML MDRD: >90 ML/MIN/1.73M2
GFR SERPL CREATININE-BSD FRML MDRD: >90 ML/MIN/1.73M2
GLUCOSE BLD-MCNC: 102 MG/DL (ref 70–99)
GLUCOSE BLD-MCNC: 98 MG/DL (ref 70–99)
GLUCOSE BLDC GLUCOMTR-MCNC: 109 MG/DL (ref 70–99)
GLUCOSE BLDC GLUCOMTR-MCNC: 110 MG/DL (ref 70–99)
GLUCOSE BLDC GLUCOMTR-MCNC: 111 MG/DL (ref 70–99)
GLUCOSE BLDC GLUCOMTR-MCNC: 118 MG/DL (ref 70–99)
HCT VFR BLD AUTO: 31.7 % (ref 40–53)
HCT VFR BLD AUTO: 34.8 % (ref 40–53)
HGB BLD-MCNC: 10.9 G/DL (ref 13.3–17.7)
HGB BLD-MCNC: 9.7 G/DL (ref 13.3–17.7)
IMM GRANULOCYTES # BLD: 0 10E3/UL
IMM GRANULOCYTES NFR BLD: 0 %
INR PPP: 1.15 (ref 0.85–1.15)
LYMPHOCYTES # BLD AUTO: 1.6 10E3/UL (ref 0.8–5.3)
LYMPHOCYTES NFR BLD AUTO: 20 %
MAGNESIUM SERPL-MCNC: 2.2 MG/DL (ref 1.6–2.3)
MCH RBC QN AUTO: 30.4 PG (ref 26.5–33)
MCH RBC QN AUTO: 30.6 PG (ref 26.5–33)
MCHC RBC AUTO-ENTMCNC: 30.6 G/DL (ref 31.5–36.5)
MCHC RBC AUTO-ENTMCNC: 31.3 G/DL (ref 31.5–36.5)
MCV RBC AUTO: 98 FL (ref 78–100)
MCV RBC AUTO: 99 FL (ref 78–100)
MONOCYTES # BLD AUTO: 1.1 10E3/UL (ref 0–1.3)
MONOCYTES NFR BLD AUTO: 14 %
NEUTROPHILS # BLD AUTO: 5 10E3/UL (ref 1.6–8.3)
NEUTROPHILS NFR BLD AUTO: 62 %
NRBC # BLD AUTO: 0 10E3/UL
NRBC BLD AUTO-RTO: 0 /100
PHOSPHATE SERPL-MCNC: 3.3 MG/DL (ref 2.5–4.5)
PLATELET # BLD AUTO: 145 10E3/UL (ref 150–450)
PLATELET # BLD AUTO: 173 10E3/UL (ref 150–450)
POTASSIUM BLD-SCNC: 3.8 MMOL/L (ref 3.4–5.3)
POTASSIUM BLD-SCNC: 4 MMOL/L (ref 3.4–5.3)
PREALB SERPL IA-MCNC: 12 MG/DL (ref 15–45)
PREALB SERPL IA-MCNC: 13 MG/DL (ref 15–45)
PROT SERPL-MCNC: 6.2 G/DL (ref 6.8–8.8)
RBC # BLD AUTO: 3.19 10E6/UL (ref 4.4–5.9)
RBC # BLD AUTO: 3.56 10E6/UL (ref 4.4–5.9)
SODIUM SERPL-SCNC: 141 MMOL/L (ref 133–144)
SODIUM SERPL-SCNC: 143 MMOL/L (ref 133–144)
WBC # BLD AUTO: 7.6 10E3/UL (ref 4–11)
WBC # BLD AUTO: 8 10E3/UL (ref 4–11)

## 2022-01-10 PROCEDURE — 120N000002 HC R&B MED SURG/OB UMMC

## 2022-01-10 PROCEDURE — 87077 CULTURE AEROBIC IDENTIFY: CPT

## 2022-01-10 PROCEDURE — 250N000009 HC RX 250: Performed by: RADIOLOGY

## 2022-01-10 PROCEDURE — 250N000013 HC RX MED GY IP 250 OP 250 PS 637

## 2022-01-10 PROCEDURE — 250N000013 HC RX MED GY IP 250 OP 250 PS 637: Performed by: INTERNAL MEDICINE

## 2022-01-10 PROCEDURE — 87070 CULTURE OTHR SPECIMN AEROBIC: CPT | Performed by: RADIOLOGY

## 2022-01-10 PROCEDURE — 0JPT3XZ REMOVAL OF TUNNELED VASCULAR ACCESS DEVICE FROM TRUNK SUBCUTANEOUS TISSUE AND FASCIA, PERCUTANEOUS APPROACH: ICD-10-PCS | Performed by: RADIOLOGY

## 2022-01-10 PROCEDURE — 85025 COMPLETE CBC W/AUTO DIFF WBC: CPT

## 2022-01-10 PROCEDURE — 80053 COMPREHEN METABOLIC PANEL: CPT | Performed by: INTERNAL MEDICINE

## 2022-01-10 PROCEDURE — 84100 ASSAY OF PHOSPHORUS: CPT | Performed by: INTERNAL MEDICINE

## 2022-01-10 PROCEDURE — 84134 ASSAY OF PREALBUMIN: CPT | Performed by: INTERNAL MEDICINE

## 2022-01-10 PROCEDURE — 999N000248 HC STATISTIC IV INSERT WITH US BY RN

## 2022-01-10 PROCEDURE — 36589 REMOVAL TUNNELED CV CATH: CPT | Mod: GC | Performed by: RADIOLOGY

## 2022-01-10 PROCEDURE — 83735 ASSAY OF MAGNESIUM: CPT | Performed by: INTERNAL MEDICINE

## 2022-01-10 PROCEDURE — 250N000013 HC RX MED GY IP 250 OP 250 PS 637: Performed by: STUDENT IN AN ORGANIZED HEALTH CARE EDUCATION/TRAINING PROGRAM

## 2022-01-10 PROCEDURE — 250N000011 HC RX IP 250 OP 636: Performed by: STUDENT IN AN ORGANIZED HEALTH CARE EDUCATION/TRAINING PROGRAM

## 2022-01-10 PROCEDURE — 85027 COMPLETE CBC AUTOMATED: CPT | Performed by: STUDENT IN AN ORGANIZED HEALTH CARE EDUCATION/TRAINING PROGRAM

## 2022-01-10 PROCEDURE — 250N000009 HC RX 250: Performed by: INTERNAL MEDICINE

## 2022-01-10 PROCEDURE — 82310 ASSAY OF CALCIUM: CPT | Performed by: STUDENT IN AN ORGANIZED HEALTH CARE EDUCATION/TRAINING PROGRAM

## 2022-01-10 PROCEDURE — 99232 SBSQ HOSP IP/OBS MODERATE 35: CPT | Mod: GC | Performed by: INTERNAL MEDICINE

## 2022-01-10 PROCEDURE — 87149 DNA/RNA DIRECT PROBE: CPT

## 2022-01-10 PROCEDURE — 36589 REMOVAL TUNNELED CV CATH: CPT

## 2022-01-10 PROCEDURE — 36415 COLL VENOUS BLD VENIPUNCTURE: CPT | Performed by: STUDENT IN AN ORGANIZED HEALTH CARE EDUCATION/TRAINING PROGRAM

## 2022-01-10 PROCEDURE — 250N000011 HC RX IP 250 OP 636: Performed by: EMERGENCY MEDICINE

## 2022-01-10 PROCEDURE — 99207 PR CDG-MDM COMPONENT: MEETS MODERATE - DOWN CODED: CPT | Performed by: INTERNAL MEDICINE

## 2022-01-10 PROCEDURE — 36592 COLLECT BLOOD FROM PICC: CPT | Performed by: INTERNAL MEDICINE

## 2022-01-10 PROCEDURE — 85610 PROTHROMBIN TIME: CPT | Performed by: INTERNAL MEDICINE

## 2022-01-10 PROCEDURE — 02PYX3Z REMOVAL OF INFUSION DEVICE FROM GREAT VESSEL, EXTERNAL APPROACH: ICD-10-PCS | Performed by: RADIOLOGY

## 2022-01-10 PROCEDURE — 250N000013 HC RX MED GY IP 250 OP 250 PS 637: Performed by: EMERGENCY MEDICINE

## 2022-01-10 PROCEDURE — 87077 CULTURE AEROBIC IDENTIFY: CPT | Performed by: RADIOLOGY

## 2022-01-10 RX ORDER — OXYCODONE HCL 5 MG/5 ML
5-10 SOLUTION, ORAL ORAL 3 TIMES DAILY PRN
Status: DISCONTINUED | OUTPATIENT
Start: 2022-01-10 | End: 2022-01-11

## 2022-01-10 RX ORDER — LIDOCAINE HYDROCHLORIDE 10 MG/ML
1-30 INJECTION, SOLUTION EPIDURAL; INFILTRATION; INTRACAUDAL; PERINEURAL
Status: COMPLETED | OUTPATIENT
Start: 2022-01-10 | End: 2022-01-10

## 2022-01-10 RX ADMIN — OXYCODONE HYDROCHLORIDE 10 MG: 5 SOLUTION ORAL at 06:34

## 2022-01-10 RX ADMIN — OXYCODONE HYDROCHLORIDE 10 MG: 5 SOLUTION ORAL at 21:40

## 2022-01-10 RX ADMIN — LORAZEPAM 1 MG: 1 TABLET ORAL at 20:04

## 2022-01-10 RX ADMIN — OXYCODONE HYDROCHLORIDE 10 MG: 5 SOLUTION ORAL at 14:53

## 2022-01-10 RX ADMIN — CARVEDILOL 6.25 MG: 3.12 TABLET, FILM COATED ORAL at 07:48

## 2022-01-10 RX ADMIN — CEFAZOLIN SODIUM 2 G: 2 INJECTION, SOLUTION INTRAVENOUS at 06:18

## 2022-01-10 RX ADMIN — Medication 1 DROP: at 00:11

## 2022-01-10 RX ADMIN — SUCRALFATE 1 G: 1 SUSPENSION ORAL at 17:14

## 2022-01-10 RX ADMIN — ONDANSETRON 8 MG: 4 TABLET, ORALLY DISINTEGRATING ORAL at 06:21

## 2022-01-10 RX ADMIN — FENTANYL 1 PATCH: 25 PATCH, EXTENDED RELEASE TRANSDERMAL at 21:38

## 2022-01-10 RX ADMIN — Medication: at 22:09

## 2022-01-10 RX ADMIN — NYSTATIN: 100000 CREAM TOPICAL at 07:48

## 2022-01-10 RX ADMIN — CARVEDILOL 6.25 MG: 3.12 TABLET, FILM COATED ORAL at 17:14

## 2022-01-10 RX ADMIN — SUCRALFATE 1 G: 1 SUSPENSION ORAL at 07:47

## 2022-01-10 RX ADMIN — SUCRALFATE 1 G: 1 SUSPENSION ORAL at 11:05

## 2022-01-10 RX ADMIN — SUCRALFATE 1 G: 1 SUSPENSION ORAL at 21:40

## 2022-01-10 RX ADMIN — CEFAZOLIN SODIUM 2 G: 2 INJECTION, SOLUTION INTRAVENOUS at 18:16

## 2022-01-10 RX ADMIN — Medication 1 DROP: at 06:34

## 2022-01-10 RX ADMIN — ACETAMINOPHEN 500 MG: 325 SOLUTION ORAL at 06:22

## 2022-01-10 RX ADMIN — ACETAMINOPHEN 500 MG: 325 SOLUTION ORAL at 17:14

## 2022-01-10 RX ADMIN — DEXTROAMPHETAMINE SACCHARATE, AMPHETAMINE ASPARTATE, DEXTROAMPHETAMINE SULFATE AND AMPHETAMINE SULFATE 20 MG: 2.5; 2.5; 2.5; 2.5 TABLET ORAL at 07:47

## 2022-01-10 RX ADMIN — NICOTINE 1 PATCH: 14 PATCH, EXTENDED RELEASE TRANSDERMAL at 00:12

## 2022-01-10 RX ADMIN — ACETAMINOPHEN 500 MG: 325 SOLUTION ORAL at 11:05

## 2022-01-10 RX ADMIN — LIDOCAINE HYDROCHLORIDE 10 ML: 10 INJECTION, SOLUTION EPIDURAL; INFILTRATION; INTRACAUDAL; PERINEURAL at 14:26

## 2022-01-10 RX ADMIN — ONDANSETRON 8 MG: 4 TABLET, ORALLY DISINTEGRATING ORAL at 14:53

## 2022-01-10 RX ADMIN — NYSTATIN: 100000 CREAM TOPICAL at 21:31

## 2022-01-10 RX ADMIN — I.V. FAT EMULSION 250 ML: 20 EMULSION INTRAVENOUS at 22:09

## 2022-01-10 RX ADMIN — OXYCODONE HYDROCHLORIDE 10 MG: 5 SOLUTION ORAL at 02:09

## 2022-01-10 ASSESSMENT — ACTIVITIES OF DAILY LIVING (ADL)
ADLS_ACUITY_SCORE: 9

## 2022-01-10 ASSESSMENT — MIFFLIN-ST. JEOR: SCORE: 1798.13

## 2022-01-10 NOTE — PLAN OF CARE
[id number]  No acute event, Afebrile, VSS.  Cm catheter removed without complication.   Pt back to floor at 1530, C/O pain, PRN oxycodone given.  Afebrile, VSS.     NEURO: Alert and oriented x4.      RESPIRATORY: Room Air, denies dizziness, and SOB. Lungs sound, clear, equal bilateral.     CARDIO: VSS, denies dizziness, and extremity pain.      GI/:  Denies N/V, BS active, No BM for a while (per pt.) , void urine spontaneously without saving.       SKIN: Intact.      ACTIVITY: Independent     PAIN: Yes, Abdominal    DLA: PIV, NS locked.    BG: Yes.      PLAN: TPN   Continue monitoring.

## 2022-01-10 NOTE — TELEPHONE ENCOUNTER
PA Initiation    Medication: amphetamine-dextroamphetamine (ADDERALL) 20 MG tablet  Insurance Company:    Pharmacy Filling the Rx: [facility] - [place], [place] - [address] [place] [address]  Filling Pharmacy Phone: [phone]  Filling Pharmacy Fax:    Start Date: 1/10/2022

## 2022-01-10 NOTE — PROVIDER NOTIFICATION
#[id number]  Parker Acevedo; Pt don't want TPN infusion tonight.   Pharmacist notified, but wait to provider order (verbal or written) to not prepare TPN tonight.     Thank You   RN [id number]

## 2022-01-10 NOTE — IR NOTE
Patient Name: Parker Acevedo  Medical Record Number: [id number]  Today's Date: 1/10/2022    Procedure: tunneled central line removal  Proceduralist: Dr. Richardson    Procedure Start: 14:23  Procedure end: 14:39  Sedation medications administered: none, local    Report given to: 7D CHRISTY Monzon  : none    Other Notes: Pt arrived to IR room 5 from 7D. Consent reviewed. Pt denies any questions or concerns regarding procedure. Pt positioned supine and monitored per protocol. Pt tolerated procedure without any noted complications. Pt transferred back to 7D.

## 2022-01-10 NOTE — PLAN OF CARE
A/Ox4. VSS on RA. Afebrile. C/o abdominal pain, given PRN oxycodone x2 and tylenol x1. Intermittent nausea, given zofran x1. Cycled TPN infusing at 114 mL/hr. Started vanco, given benadryl as premed. Ativan given x1 for anxiety. BG checks Q6. Fentanyl patch on L arm. Plan to have Cm line removed in IR tomorrow.

## 2022-01-10 NOTE — TELEPHONE ENCOUNTER
PA Initiation    Medication: LORazepam (ATIVAN) 1 MG tablet  Insurance Company:    Pharmacy Filling the Rx: [facility] [place] - [address] [facility]  Filling Pharmacy Phone: [phone]  Filling Pharmacy Fax:    Start Date: 1/10/2022

## 2022-01-10 NOTE — CONSULTS
INTERVENTIONAL RADIOLOGY CONSULT    Parker Acevedo is a 51 year old male with short gut syndrome, TPN dependent s/p multiple tunneled central venous catheters and ports, most recently had tunneled 9.5 Fr double lumen polyurethane line placed 5/7/21. Now with staph epidermidis bacteremia and staph hominis bacteremia. IR consulted for line removal. Patient has never received sedation for a line removal in the past so does not need to be NPO.     Jhonatan Alejandro PA-C  Interventional Radiology  [phone] (IR pager)

## 2022-01-10 NOTE — TELEPHONE ENCOUNTER
Prior Authorization Approval    Authorization Effective Date: 1/1/2022  Authorization Expiration Date: 1/10/2023  Medication: LORazepam (ATIVAN) 1 MG tablet  Approved Dose/Quantity: 30/30 DAYS   Reference #:     Insurance Company:    Expected CoPay:       CoPay Card Available:      Foundation Assistance Needed:    Which Pharmacy is filling the prescription (Not needed for infusion/clinic administered): [facility] [place] [address] [place] [address]  Pharmacy Notified: Yes  Patient Notified: Yes-PHARMACY STATED THEY WILL NOTIFY PATIENT ONCE FILLED.

## 2022-01-10 NOTE — PROCEDURES
"[facility]    Procedure: IR Procedure Note    Date/Time: 1/10/2022 2:44 PM  Performed by: Francis Arizmendi  Authorized by: Shanae Richardson MD   IR Fellow Physician: Francis Arizmendi MD      UNIVERSAL PROTOCOL   Site Marked: NA  Prior Images Obtained and Reviewed:  Yes  Required items: Required blood products, implants, devices and special equipment available    Patient identity confirmed:  Verbally with patient, arm band, provided demographic data and hospital-assigned identification number  Patient was reevaluated immediately before administering moderate or deep sedation or anesthesia  Confirmation Checklist:  Patient's identity using two indicators, relevant allergies, procedure was appropriate and matched the consent or emergent situation and correct equipment/implants were available  Time out: Immediately prior to the procedure a time out was called    Universal Protocol: the Joint Commission Universal Protocol was followed    Preparation: Patient was prepped and draped in usual sterile fashion       ANESTHESIA    Anesthesia: Local infiltration  Local Anesthetic:  Lidocaine 1% without epinephrine  Anesthetic Total (mL):  10    See dictated procedure note for full details.  Findings: Right internal jugular Port-A-Cath removed. Catheter tip sent for culture    Specimens: other (see comment) (catheter tip sent for culture)    Complications: None    Condition: Stable      PROCEDURE    Patient Tolerance:  Patient tolerated the procedure well with no immediate complications  Length of time physician/provider present for 1:1 monitoring during sedation: 15    Addendum: Original note mistakenly said Port-a-cath removal. The note should read \"Right internal jugular tunneled central venous catheter removal.\"    "

## 2022-01-10 NOTE — PROGRESS NOTES
[facility]    Progress Note - Dez 2 Service        Date of Admission:  1/8/2022    Assessment & Plan   Parker Acevedo is a 51 year old male admitted on 1/8/2022. He has a history of recurrent bacteremia/line infections, history of Celestino-en-Y gastric bypass, esophagojejunostomy complicated by short gut syndrome, severe malnutrition on TPN, HTN, ADHD, GERD, asthma. He is admitted for Staphylococcus epidermidis and Staphylococcus hominis bacteremia, concerning for line infection. Plan for right Cm line removal 1/10/2022.    Changes Today:  - IR consult for Cm catheter removal- plan for 1/10/2022  - will reach out to ID about access following Cm line removal  - was briefly on vancomycin, discontinued as Staph hominis sensitive to oxacillin  - decreased frequency of oxycodone back to pta dose from 5-10mg QID PRN to TID PRN     Staphylococcus epidermidis and hominis bacteremia in the setting of recurrent line infections   Recent history of fevers/chills, malaise, headaches, and myalgias since 1/4/22, for which blood cultures were obtained (1/6/22) from Cm and peripheral that both grew S. Epi and S. Hominis (both oxacillin sensitive). Repeat blood cultures from admission (1/8/22) also positive for GPC in clusters. Likely source is Cm catheter. As for other etiologies, he does not have a history of endocarditis/mechanical valve/pacemaker, TTE this admission not suggestive of vegetations, no history of joint replacement. Patient currently being managed on cefazolin with initial improvement in symptoms, Cm line removal for definitive source control planned for 1/10/2022.  - ID consulted  - cefazolin 2g q8h (1/8 - P)   - was briefly on vancomycin, discontinued as Staph hominis sensitive to oxacillin  - continue pta acetaminophen 500mg q4h prn  - Obtain daily blood cultures from Cm/peripheral  - IR consult for Cm catheter removal- plan  for 1/10/2022  - will reach out to ID about access following Cm line removal     Complex GI surgery history   Right Cm catheter for TPN  G tube  Nausea, chronic  Likely plan to have Cm catheter removed during this admission for source control of Staphylococcus epidermidis bacteremia, as above. He normally gets TPN as well as fluids (~1-2L LR daily) through the Cm.   - TPN, per RD  - continue pta ondansetron 8mg q8h prn  - continue pta sucralfate 1g qid prn  - continue pta nystatin cream (for G tube site)  - Administer 1L NS bolus per day PRN    Headaches + myalgias amidst underlying bacteremia  H/o chronic pain  Patient endorsing persistent presence of poorly controlled headache and myalgias since admission  - Scheduled tylenol 500mg q4h  - continue pta fentanyl patch q48 hours  - decreased frequency of oxycodone back to pta dose from 5-10mg QID PRN to TID PRN (because headache has improved)    Chronic/stable  #Chronic normocytic anemia, stable  Baseline hgb ~10-12.  - continue to monitor    #Anxiety  - continue pta lorazepam 1mg daily prn     #ADHD  - continue pta Adderall 20mg daily     #HTN  - continue pta carvedilol 6.25mg bid      #Asthma  - continue pta albuterol prn     #GERD  - continue pta pantoprazole 40mg daily     Diet: Snacks/Supplements Adult: Other; Ensure Enlive (amie/strawberry) @ 10 and 2.  Please also send Powerade/Gatorade (8 oz) at each of these snack times as well.; Between Meals  parenteral nutrition - ADULT compounded formula CYCLE  Regular Diet Adult  parenteral nutrition - ADULT compounded formula CYCLE    DVT Prophylaxis: Pneumatic Compression Devices  Sanchez Catheter: Not present  Fluids: no continuous fluids  Central Lines: PRESENT  CVC Double Lumen Right Internal jugular Tunneled-Site Assessment: WDL  Code Status: Full Code      Disposition Plan   Expected Discharge: >2 days, pending ID consult, removal of Cm, and coordination of abx plan  Anticipated discharge  location:  Awaiting care coordination huddle     The patient's care was discussed with the Attending Physician, Dr. Dias.    John Wren MD  [address]  Securely message with the Vocera Web Console (learn more here)  Text page via [facility] Paging/Directory    Please see sign in/sign out for up to date coverage information    Clinically Significant Risk Factors Present on Admission             # Overweight: last Body mass index is 28.01 kg/m .      ______________________________________________________________________    Interval History   No acute events overnight. Reports that headaches have improved. Still has some nausea but has gone down some. No fevers, chills, shortness of breath, chest pain, abdominal pain, vomiting. He is eager to have his Cm catheter removed.     Data reviewed today: I reviewed all medications, new labs and imaging results over the last 24 hours.    Physical Exam   Vital Signs: Temp: 98.7  F (37.1  C) Temp src: Oral BP: 130/85 Pulse: 66   Resp: 18 SpO2: 98 % O2 Device: None (Room air)    Weight: 200 lbs 13.42 oz  General: sitting up in bed, NAD  HEENT: no scleral icterus or conjunctival injection, oropharynx clear  Resp: clear to auscultation bilaterally, no crackles or wheezes  Cardiac: regular rate and rhythm, no murmurs  Chest: right Cm catheter without surrounding purulence, tenderness, erythema  Abdomen: soft, non-tender, non-distended. Left G tube without purulence.   Extremities: warm, no lower extremity edema  MSK: normal muscle bulk.  Skin: no lesions on exposed skin  Neuro: alert, oriented x4. No focal neuro deficits  Psych: appropriate mood and affect, answers questions appropriately    Data   Recent Labs   Lab 01/10/22  1223 01/10/22  0745 01/10/22  0629 01/09/22  1307 01/09/22  0558 01/09/22  0034 01/08/22  1828 01/08/22  1430   WBC  --  8.0  --   --  12.5*  --  11.6* 4.6   HGB  --  9.7*  --   --  9.8*   --  10.5* 11.3*   MCV  --  99  --   --  97  --  94 97   PLT  --  145*  --   --  130*  --  127* 145*   INR  --  1.15  --   --  1.27*  --   --  1.07   NA  --  143  --   --  140  --  139 141   POTASSIUM  --  4.0  --   --  3.9  --  3.0* 3.9   CHLORIDE  --  110*  --   --  110*  --  106 108   CO2  --  28  --   --  26  --  27 29   BUN  --  18  --   --  14  --  9 10   CR  --  0.72  --   --  0.66  --  0.69 0.69   ANIONGAP  --  5  --   --  4  --  6 4   SILAS  --  8.2*  --   --  8.8  --  8.2* 8.6   * 98 110*   < > 114*   < > 115* 96   ALBUMIN  --  2.6*  --   --  2.5*  --  2.7* 3.2*   PROTTOTAL  --  6.2*  --   --  6.0*  --  6.1* 7.0   BILITOTAL  --  0.2  --   --  0.5  --  0.6 0.5   ALKPHOS  --  58  --   --  60  --  68 76   ALT  --  22  --   --  28  --  31 37   AST  --  11  --   --  11  --  16 21    < > = values in this interval not displayed.

## 2022-01-10 NOTE — PLAN OF CARE
[id number]    A&Ox4. VSS on RA. Afebrile. Pt refused continuous pulse ox monitoring overnight. Nausea managed with prn zofran x1. C/o pain, given oxycodone x2 and scheduled tylenol with some relief. C/o dry eyes, refresh eye drops given x2. Tube feeds infusing, Q6 blood sugar checks completed. Pt has been NPO since midnight for IR today. Continue with plan of care.

## 2022-01-10 NOTE — PHARMACY-VANCOMYCIN DOSING SERVICE
Pharmacy Vancomycin Initial Note  Date of Service 2022  Patient's  1970  51 year old, male    Indication: Bacteremia    Current estimated CrCl = Estimated Creatinine Clearance: 171.8 mL/min (based on SCr of 0.66 mg/dL).    Creatinine for last 3 days  2022:  2:30 PM Creatinine 0.69 mg/dL;  6:28 PM Creatinine 0.69 mg/dL  2022:  5:58 AM Creatinine 0.66 mg/dL    Recent Vancomycin Level(s) for last 3 days  No results found for requested labs within last 72 hours.      Vancomycin IV Administrations (past 72 hours)      No vancomycin orders with administrations in past 72 hours.                Nephrotoxins and other renal medications (From now, onward)    Start     Dose/Rate Route Frequency Ordered Stop    22 1930  vancomycin (VANCOCIN) 2,000 mg in sodium chloride 0.9 % 500 mL intermittent infusion         2,000 mg  over 120 Minutes Intravenous ONCE 22 1830            Contrast Orders - past 72 hours (72h ago, onward)            None          InsightRX Prediction of Planned Initial Vancomycin Regimen  Loading dose: 2000 mg IV once.  Regimen: 1250 mg IV every 12 hours.  Start time: 19:30 on 2022  Exposure target: AUC24 (range)400-600 mg/L.hr   AUC24,ss: 462 mg/L.hr  Probability of AUC24 > 400: 65 %  Ctrough,ss: 13.7 mg/L  Probability of Ctrough,ss > 20: 23 %  Probability of nephrotoxicity (Lodise CARMELA ): 9 %        Plan:  1. Give vancomycin 2000 mg IV once. Then start vancomycin  1250 mg IV q12h.   2. Vancomycin monitoring method: AUC  3. Vancomycin therapeutic monitoring goal: 400-600 mg*h/L  4. Pharmacy will check vancomycin levels as appropriate in 1-3 Days.    5. Serum creatinine levels will be ordered daily for the first week of therapy and at least twice weekly for subsequent weeks.      Tia Simpson, PharmD - PGY1 Pharmacy Practice Resident

## 2022-01-10 NOTE — PROGRESS NOTES
Clinic Care Coordination Contact  Ambulatory Care Coordination to Inpatient Care Management   Hand-In Communication    Date:  January 10, 2022  Name: Parker Acevedo is enrolled in Ambulatory Care Coordination program and I am the Lead Care Coordinator.  CC Contact Information: Epic InBasket + phone  Payor Source: Payor: [facility] / Plan: [facility] MEDICARE ADVANTAGE / Product Type: Medicare /   Current services in place:     Please see the CC Snaphot and Care Management Flowsheets for specific  details of this Parker Acevedo care plan.   Additional details/specific concerns r/t this admission:    Goals of Care :   Goals        #2 Medical (pt-stated)       Goal Statement: I want to prevent hospital visits  Date Goal set: 9/9/2020   Barriers: on chronic TPN, IV line  Strengths: home infusion, care coordination  Date to Achieve By: 2/1/2022  Patient expressed understanding of goal: yes  Action steps to achieve this goal:  1. I will complete IV antibiotics as prescribed. (completed)  2. I will follow up with infectious disease provider as scheduled 9/24/20. (completed)  3. I will stay at home during COVID-19 pandemic.   4. I will monitor my temperature daily as instructed and contact [facility] for elevated temperature parameters.  5. I will have G-tube replaced as scheduled. (completed)            I will follow this admission in Epic. Please feel free to contact me with questions or for further collaboration in discharge planning.

## 2022-01-10 NOTE — PROGRESS NOTES
Physician Attestation:  This patient has been seen, examined and evaluated by me, Koko Gustafson MD.  Discussed with the medical student and agree with the findings, assessment and plan in this note. The medical student is acting as a scribe.    Koko Gustafson MD  ID Staff Physician  01/10/2022          [facility] ID Service: Follow-up Note      Patient:  Parker Acevedo, Date of birth 1970, Medical record number [id number]  Date of Visit:  January 10, 2022  Reason for consult: Bacteremia         Assessment and Recommendations:     ID Problem list:  1. CLABSI  2. Staph epidermidis bacteremia (oxacillin-susceptible)  3. Staph hominis bacteremia (oxacillin-susceptible)  4. Short gut syndrome, on TPN  5. Hx of prior CLABSI    Recs:  1. Discontinue vancomycin given both staph hominis and staph epi are oxacillin-susceptible  2.  Continue IV cefazolin given reported hx of anaphylaxis with penicillins  3.   Follow-up pending blood cultures; would continue to check blood cultures daily until negative [id number]  4. Monitor clinical status following CVC removal with IR today  5. Would not recommend replacing CVC until blood cultures are negative [id number] (currently cultures from 1/9 are 24hrs NGTD)    Discussion:  Parker is a 51M with PMH including Celestino-en-Y gastric bypass and esophagojejunostomy complicated by short gut syndrome and chronic malnutrition requiring TPN, as well as several incidents of polymicrobial central line infection resulting in line replacements (previously followed in ID clinic with Dr. Buckner for this), admitted 1/8 under the instruction of Dr. Buckner due to positive blood cultures. Currently, blood cultures from 1/6-1/8 positive for Staph epidermidis (MSSE) and Staph hominis (oxacillin-susceptible) and blood cultures from 1/9 with no growth to date. He was initially started on IV vancomycin 1/8 while at home by Dr. Buckner, and on admission was changed to IV cefazolin (1/8-current); IV  vancomycin was restarted 1/9 given pending susceptibilities and history of staph hominis bacteremia in 1/2021 which was oxacillin-resistant, however susceptibilities returned with oxacillin-susceptible staph hominis on 1/10 and it would be reasonable at this time to discontinue vancomycin. He is scheduled 1/10/2022 for CVC removal with IR for source control.        Recs given to primary team. Thank you for allowing us to participate in the care of this patient. Can call with questions. ID will continue to follow.    This patient's care was discussed with Dr. Koko Gustafson.    Sabina Foster, MS4  01/10/2022             Interval History:       Patient with subjective improvement in fevers. Also has had improvement in central line site discomfort. Reports no productive cough, CP, dyspnea, nausea, vomiting, diarrhea, abdominal pain.          Review of Systems:   Full 8 point ROS obtained, pertinent positives and negatives as above.            Current Antimicrobials   Cefazolin IV  Vancomycin IV         Physical Exam:   Ranges for vital signs:  Temp:  [98.1  F (36.7  C)-99.2  F (37.3  C)] 98.8  F (37.1  C)  Pulse:  [61-87] 61  Resp:  [16-18] 16  BP: (109-128)/(56-82) 118/76  SpO2:  [97 %-99 %] 98 %    Intake/Output Summary (Last 24 hours) at 1/10/2022 0931  Last data filed at 1/10/2022 0659  Gross per 24 hour   Intake 1324.8 ml   Output --   Net 1324.8 ml     Exam:  GENERAL:  well-developed, sitting in bed in no acute distress. Wearing sunglasses.  ENT:  Head is normocephalic, atraumatic. Oropharynx is moist without exudates or ulcers.  LUNGS:  Unlabored breathing on room air.  CARDIOVASCULAR:  Regular rate.  ABDOMEN:  Non-distended, soft, nontender.  EXT: Extremities warm and well perfused. No edema.  MSK/SKIN:  No acute rashes.  Right chest CVC site bandaged without tenderness or surrounding erythema.  NEURO: awake, alert, interactive           Laboratory Data:   Reviewed.  Pertinent for:   downtrending WBC, 12.5 -->  8.0    Recent culture data:  1/10: blood cultures from CVC x2 pending  1/9: blood cultures R arm NGTD; blood cultures L hand pending  1/8: blood cultures CVC 2/2 bottles positive for gram+ cocci clusters; blood cultures R hand 2/2 bottles positive for staph epidermidis   1/6: blood cultures R hand and CVC positive for staph epidermidis and staph hominis (both oxacillin susceptible)      Inflammatory Markers    Recent Labs   Lab Test 01/08/22  1430 11/16/21  1300 08/24/21  0855 07/13/21  1110 06/29/21  1016 06/14/21  1017 06/09/21  1044 03/25/21  1355 11/17/20  1445 07/28/20  1607 06/28/19  1345 05/14/19  1145 02/12/18  1400 01/23/18  0845 01/20/18  1030 10/02/17  1030 09/24/17  1900 06/29/17  1009 06/28/17  2222 03/12/17  0351 02/10/17  1520 11/01/16  1530   SED  --   --   --   --   --   --   --   --   --  10  --  13  --  10 11  --  31*  --  26* 17*  --  27*   CRP 52.0* <2.9 <2.9 7.8 <2.9 <2.9 <2.9 9.5*   < > <2.9   < >  --    < > <2.9 5.4   < > 26.6*   < > 53.0* 3.4   < > 8.4*    < > = values in this interval not displayed.       Hematology Studies    Recent Labs   Lab Test 01/10/22  0745 01/09/22  0558 01/08/22  1828 01/08/22  1430 01/04/22  1150 12/29/21  1115 07/13/21  1110 06/29/21  1016 06/14/21  1017 06/09/21  1044 06/01/21  1117 05/25/21  1147 05/19/21  1342 05/18/21  1015   WBC 8.0 12.5* 11.6* 4.6 8.2 10.0   < > 6.9 8.1 7.5 7.3   < > 6.1 6.2   ANEU  --   --   --   --   --   --   --  3.1 4.1 4.7 4.3  --  3.5 3.7   AEOS  --   --   --   --   --   --   --  0.2 0.2 0.3 0.1  --  0.2 0.2   HGB 9.7* 9.8* 10.5* 11.3* 11.0* 12.6*   < > 12.1* 12.0* 13.3 12.1*   < > 12.5* 12.6*   MCV 99 97 94 97 98 94   < > 96 97 96 94   < > 97 98   * 130* 127* 145* 156 202   < > 178 158 169 174   < > 159 145*    < > = values in this interval not displayed.       Metabolic Studies     Recent Labs   Lab Test 01/10/22  0745 01/09/22  0558 01/08/22  1828 01/08/22  1430 01/04/22  1150    140 139 141 141   POTASSIUM 4.0 3.9  3.0* 3.9 4.0   CHLORIDE 110* 110* 106 108 111*   CO2 28 26 27 29 28   BUN 18 14 9 10 20   CR 0.72 0.66 0.69 0.69 0.76   GFRESTIMATED >90 >90 >90 >90 >90       Hepatic Studies    Recent Labs   Lab Test 01/10/22  0745 01/09/22  0558 01/08/22  1828 01/08/22  1430 01/04/22  1150 12/29/21  1115   BILITOTAL 0.2 0.5 0.6 0.5 0.3 0.7  0.7   ALKPHOS 58 60 68 76 81 67   ALBUMIN 2.6* 2.5* 2.7* 3.2* 3.0* 3.0*   AST 11 11 16 21 21 20   ALT 22 28 31 37 50 39       Microbiology:  Culture   Date Value Ref Range Status   01/09/2022 No growth after 1 day  Preliminary   01/08/2022 Positive on the 1st day of incubation (A)  Preliminary   01/08/2022 Gram positive cocci in clusters (AA)  Preliminary     Comment:     2 of 2 bottles   01/08/2022 Positive on the 1st day of incubation (A)  Preliminary   01/08/2022 Gram positive cocci in clusters (AA)  Preliminary     Comment:     2 of 2 bottles   01/06/2022 Positive on the 1st day of incubation (A)  Final   01/06/2022 Staphylococcus epidermidis (AA)  Final     Comment:     2 of 2 bottles   01/06/2022 Staphylococcus hominis (AA)  Final     Comment:     1 of 2 bottles   01/06/2022 Positive on the 1st day of incubation (A)  Final   01/06/2022 Staphylococcus hominis (AA)  Final     Comment:     2 of 2 bottles  Susceptibilities done on previous cultures   01/06/2022 Staphylococcus epidermidis (AA)  Final     Comment:     2 of 2 bottles  Susceptibilities done on previous cultures   09/01/2021 No Growth  Final   09/01/2021 No Growth  Final   09/01/2021 No Growth  Final   08/16/2021 No Growth  Final   08/16/2021 No Growth  Final   08/16/2021 No Growth  Final   08/14/2021 No Growth  Final   08/14/2021 Positive on the 1st day of incubation (A)  Final   08/14/2021 Pseudomonas oryzihabitans (AA)  Final   08/14/2021 Pseudomonas oryzihabitans (AA)  Final     Comment:     1 of 2 bottles  Strain 2   08/10/2021 No Growth  Final   08/10/2021 No Growth  Final   08/10/2021 Positive on the 1st day of incubation  (A)  Final   08/10/2021 Staphylococcus epidermidis (AA)  Final     Comment:     1 of 2 bottles       Urine Studies    Recent Labs   Lab Test 21  1644 20  1905 19  0025 10/04/19  1344 19  1625   LEUKEST Negative Negative Negative Trace* Negative   WBCU 1 <1 <1 1 0       Vancomycin Levels    Recent Labs   Lab Test 21  0855 21  0756 21  1139   VANCOMYCIN 7.2 13.3 10.6       Hepatitis B Testing   Recent Labs   Lab Test 17  0549   HEPBANG Nonreactive     Hepatitis C Testing     Hepatitis C Antibody   Date Value Ref Range Status   2017 Nonreactive NR^Nonreactive Final     Comment:     Assay performance characteristics have not been established for newborns,   infants, and children       Respiratory Virus Testing    No results found for: RS, FLUAG         Imaging:     EXAM: XR CHEST 2 VW  2022 1:45 PM      HISTORY:  fever        COMPARISON:  Chest x-ray 3/19/2021.                                                                   IMPRESSION:   1.  Hazy right basilar opacities, atelectasis versus infection.  2.  Trace bilateral pleural effusions.      Recent Results (from the past 4320 hour(s))   Echo Limited   Result Value    LVEF  55-60%    Narrative    [id number]  [id number]  [id number]  [id number]^MARJ^LIZZIE^SOWMYA     [facility]  Echocardiography Laboratory  [address] [place] [id number]     Name: SARA HASSAN  MRN: [id number]  : 1970  Study Date: 2022 09:30 AM  Age: 51 yrs  Gender: Male  Patient Location: [facility]  Reason For Study: Endocarditis  Ordering Physician: LIZZIE MCMAHAN  Performed By: Iglesia Chaney RDCS     BSA: 2.1 m2  Height: 72 in  Weight: 198 lb  HR: 69  BP: 135/118 mmHg  ______________________________________________________________________________  Procedure  Limited Portable Echo  Adult.  ______________________________________________________________________________  Interpretation Summary  No significant valvular abnormalities were noted.  No vegetation identified.  ______________________________________________________________________________  Left Ventricle  Left ventricular size is normal. Global and regional left ventricular function  is normal with an EF of 55-60%.     Right Ventricle  The right ventricle is normal size. Global right ventricular function is  normal.     Mitral Valve  The mitral valve is normal.     Aortic Valve  Aortic valve is normal in structure and function.     Tricuspid Valve  The tricuspid valve is normal.     Pulmonic Valve  The valve leaflets are not well visualized. On Doppler interrogation, there is  no significant stenosis or regurgitation.     Vessels  The aorta root is normal. The inferior vena cava was normal in size with  preserved respiratory variability. IVC diameter <2.1 cm collapsing >50% with  sniff suggests a normal RA pressure of 3 mmHg.     Pericardium  No pericardial effusion is present.     ______________________________________________________________________________  Doppler Measurements & Calculations  PA acc time: 0.13 sec     ______________________________________________________________________________  Report approved by: Shawanda COHEN 01/09/2022 11:27 AM

## 2022-01-11 LAB
ANION GAP SERPL CALCULATED.3IONS-SCNC: 7 MMOL/L (ref 3–14)
BUN SERPL-MCNC: 14 MG/DL (ref 7–30)
CALCIUM SERPL-MCNC: 8.6 MG/DL (ref 8.5–10.1)
CHLORIDE BLD-SCNC: 109 MMOL/L (ref 94–109)
CO2 SERPL-SCNC: 25 MMOL/L (ref 20–32)
CREAT SERPL-MCNC: 0.68 MG/DL (ref 0.66–1.25)
GFR SERPL CREATININE-BSD FRML MDRD: >90 ML/MIN/1.73M2
GLUCOSE BLD-MCNC: 100 MG/DL (ref 70–99)
GLUCOSE BLDC GLUCOMTR-MCNC: 102 MG/DL (ref 70–99)
GLUCOSE BLDC GLUCOMTR-MCNC: 73 MG/DL (ref 70–99)
MAGNESIUM SERPL-MCNC: 2.2 MG/DL (ref 1.6–2.3)
PHOSPHATE SERPL-MCNC: 3.8 MG/DL (ref 2.5–4.5)
POTASSIUM BLD-SCNC: 3.5 MMOL/L (ref 3.4–5.3)
SODIUM SERPL-SCNC: 141 MMOL/L (ref 133–144)

## 2022-01-11 PROCEDURE — 999N000007 HC SITE CHECK

## 2022-01-11 PROCEDURE — 99232 SBSQ HOSP IP/OBS MODERATE 35: CPT | Mod: GC | Performed by: INTERNAL MEDICINE

## 2022-01-11 PROCEDURE — 83735 ASSAY OF MAGNESIUM: CPT | Performed by: INTERNAL MEDICINE

## 2022-01-11 PROCEDURE — 36415 COLL VENOUS BLD VENIPUNCTURE: CPT

## 2022-01-11 PROCEDURE — 99207 PR CDG-MDM COMPONENT: MEETS MODERATE - DOWN CODED: CPT | Performed by: INTERNAL MEDICINE

## 2022-01-11 PROCEDURE — 250N000011 HC RX IP 250 OP 636: Performed by: EMERGENCY MEDICINE

## 2022-01-11 PROCEDURE — 250N000009 HC RX 250: Performed by: INTERNAL MEDICINE

## 2022-01-11 PROCEDURE — 87040 BLOOD CULTURE FOR BACTERIA: CPT

## 2022-01-11 PROCEDURE — 250N000013 HC RX MED GY IP 250 OP 250 PS 637

## 2022-01-11 PROCEDURE — 84100 ASSAY OF PHOSPHORUS: CPT | Performed by: INTERNAL MEDICINE

## 2022-01-11 PROCEDURE — 80048 BASIC METABOLIC PNL TOTAL CA: CPT | Performed by: INTERNAL MEDICINE

## 2022-01-11 PROCEDURE — 120N000002 HC R&B MED SURG/OB UMMC

## 2022-01-11 PROCEDURE — 87040 BLOOD CULTURE FOR BACTERIA: CPT | Performed by: INTERNAL MEDICINE

## 2022-01-11 PROCEDURE — 250N000013 HC RX MED GY IP 250 OP 250 PS 637: Performed by: STUDENT IN AN ORGANIZED HEALTH CARE EDUCATION/TRAINING PROGRAM

## 2022-01-11 PROCEDURE — 250N000013 HC RX MED GY IP 250 OP 250 PS 637: Performed by: EMERGENCY MEDICINE

## 2022-01-11 PROCEDURE — 999N000127 HC STATISTIC PERIPHERAL IV START W US GUIDANCE

## 2022-01-11 PROCEDURE — 250N000011 HC RX IP 250 OP 636: Performed by: STUDENT IN AN ORGANIZED HEALTH CARE EDUCATION/TRAINING PROGRAM

## 2022-01-11 PROCEDURE — 36415 COLL VENOUS BLD VENIPUNCTURE: CPT | Performed by: INTERNAL MEDICINE

## 2022-01-11 PROCEDURE — 250N000013 HC RX MED GY IP 250 OP 250 PS 637: Performed by: INTERNAL MEDICINE

## 2022-01-11 RX ORDER — SODIUM CHLORIDE 9 MG/ML
INJECTION, SOLUTION INTRAVENOUS CONTINUOUS
Status: DISCONTINUED | OUTPATIENT
Start: 2022-01-11 | End: 2022-01-11 | Stop reason: CLARIF

## 2022-01-11 RX ORDER — OXYCODONE HCL 5 MG/5 ML
5-10 SOLUTION, ORAL ORAL 4 TIMES DAILY PRN
Status: DISCONTINUED | OUTPATIENT
Start: 2022-01-11 | End: 2022-01-13 | Stop reason: HOSPADM

## 2022-01-11 RX ADMIN — CEFAZOLIN SODIUM 2 G: 2 INJECTION, SOLUTION INTRAVENOUS at 15:50

## 2022-01-11 RX ADMIN — ASCORBIC ACID, VITAMIN A PALMITATE, CHOLECALCIFEROL, THIAMINE HYDROCHLORIDE, RIBOFLAVIN-5 PHOSPHATE SODIUM, PYRIDOXINE HYDROCHLORIDE, NIACINAMIDE, DEXPANTHENOL, ALPHA-TOCOPHEROL ACETATE, VITAMIN K1, FOLIC ACID, BIOTIN, CYANOCOBALAMIN: 200; 3300; 200; 6; 3.6; 6; 40; 15; 10; 150; 600; 60; 5 INJECTION, SOLUTION INTRAVENOUS at 20:26

## 2022-01-11 RX ADMIN — SUCRALFATE 1 G: 1 SUSPENSION ORAL at 08:53

## 2022-01-11 RX ADMIN — CARVEDILOL 6.25 MG: 3.12 TABLET, FILM COATED ORAL at 08:54

## 2022-01-11 RX ADMIN — NYSTATIN: 100000 CREAM TOPICAL at 20:29

## 2022-01-11 RX ADMIN — ONDANSETRON 8 MG: 4 TABLET, ORALLY DISINTEGRATING ORAL at 15:49

## 2022-01-11 RX ADMIN — ONDANSETRON 8 MG: 4 TABLET, ORALLY DISINTEGRATING ORAL at 21:59

## 2022-01-11 RX ADMIN — ACETAMINOPHEN 500 MG: 325 SOLUTION ORAL at 02:55

## 2022-01-11 RX ADMIN — LORAZEPAM 1 MG: 1 TABLET ORAL at 21:00

## 2022-01-11 RX ADMIN — NICOTINE 1 PATCH: 14 PATCH, EXTENDED RELEASE TRANSDERMAL at 23:58

## 2022-01-11 RX ADMIN — SUCRALFATE 1 G: 1 SUSPENSION ORAL at 21:59

## 2022-01-11 RX ADMIN — OXYCODONE HYDROCHLORIDE 10 MG: 5 SOLUTION ORAL at 21:59

## 2022-01-11 RX ADMIN — NYSTATIN: 100000 CREAM TOPICAL at 08:55

## 2022-01-11 RX ADMIN — NICOTINE 1 PATCH: 14 PATCH, EXTENDED RELEASE TRANSDERMAL at 00:00

## 2022-01-11 RX ADMIN — ACETAMINOPHEN 500 MG: 325 SOLUTION ORAL at 07:11

## 2022-01-11 RX ADMIN — DEXTROAMPHETAMINE SACCHARATE, AMPHETAMINE ASPARTATE, DEXTROAMPHETAMINE SULFATE AND AMPHETAMINE SULFATE 20 MG: 2.5; 2.5; 2.5; 2.5 TABLET ORAL at 08:54

## 2022-01-11 RX ADMIN — OXYCODONE HYDROCHLORIDE 10 MG: 5 SOLUTION ORAL at 15:49

## 2022-01-11 RX ADMIN — ACETAMINOPHEN 500 MG: 325 SOLUTION ORAL at 13:06

## 2022-01-11 RX ADMIN — ACETAMINOPHEN 500 MG: 325 SOLUTION ORAL at 20:25

## 2022-01-11 RX ADMIN — CARVEDILOL 6.25 MG: 3.12 TABLET, FILM COATED ORAL at 20:25

## 2022-01-11 RX ADMIN — CEFAZOLIN SODIUM 2 G: 2 INJECTION, SOLUTION INTRAVENOUS at 08:54

## 2022-01-11 RX ADMIN — OXYCODONE HYDROCHLORIDE 5 MG: 5 SOLUTION ORAL at 02:55

## 2022-01-11 RX ADMIN — CEFAZOLIN SODIUM 2 G: 2 INJECTION, SOLUTION INTRAVENOUS at 02:54

## 2022-01-11 RX ADMIN — OXYCODONE HYDROCHLORIDE 10 MG: 5 SOLUTION ORAL at 08:53

## 2022-01-11 ASSESSMENT — ACTIVITIES OF DAILY LIVING (ADL)
ADLS_ACUITY_SCORE: 9

## 2022-01-11 ASSESSMENT — MIFFLIN-ST. JEOR: SCORE: 1765.29

## 2022-01-11 NOTE — PLAN OF CARE
Afebrile. Given Oxycodone for abdominal pain with some relief. He had a positive blood culture and doctors are aware. Up independently. Denied nausea.Given Tylenol for a headache with relief.

## 2022-01-11 NOTE — PLAN OF CARE
Time 6084-9058    Reason for admission: He is admitted for Staphylococcus epidermidis and Staphylococcus hominis bacteremia, concerning for line infection. Plan for right Cm line removal 1/10/2022.  Vitals: VSS on RA  Activity: Up ad vitor  Pain: C/O abd pain - PRN Oxycodone given X 2, scheduled Tylenol  Neuro: A&OX4, uncooperative with care plan at times  Cardiac:  WDL  Respiratory:  Denies cough/SOB, LS clear  GI/: Voids spontaneously, LBM unknown  Diet: Regular, poor appetite, PPN & Lipids  Lines: R PIV SL  Skin/Wounds: CDI  Labs/imaging: Reviewed,  & 73, Refused AM labs    New changes this shift: Pt refused Lipids. Pt upset about Oxycodone TID PRN order, requesting to be QID PRN - team aware.     Approximately 0200 pt shut off PPN and refused to turn back on - notified team.    Pt adamant about discharging on 1/12 for son's birthday.         Pt leaving unit for long periods of time to calm down.    Plan: Continue IV antibiotics, Discharge home w/home care when medically stable    Continue to monitor and follow POC

## 2022-01-11 NOTE — PROVIDER NOTIFICATION
Paged [phone]: EUSEBIA pt turned off PPN at approximately 0200. Refusing to turn back on. Any interventions?

## 2022-01-11 NOTE — PROGRESS NOTES
Physician Attestation:  This patient has been seen, examined and evaluated by me, Koko Gustafson MD.  Discussed with the medical student and agree with the findings, assessment and plan in this note. The medical student is acting as a scribe.    Key points/comments: agree with IV cefazolin and discontinuation of IV vancomycin based on susceptibilities; follow up repeat blood cultures to ensure clearance s/p line removal.    Koko Gustafson MD  ID Staff Physician  01/11/2022          [facility] ID Service: Follow-up Note      Patient:  Parker Acevedo, Date of birth 1970, Medical record number [id number]  Date of Visit:  January 11, 2022  Reason for consult: Bacteremia         Assessment and Recommendations:     ID Problem list:  1. CLABSI  2. Staph epidermidis bacteremia (oxacillin-susceptible)  3. Staph hominis bacteremia (oxacillin-susceptible)  4. Short gut syndrome, on TPN  5. Hx of prior CLABSI    Recs:  1. Continue IV cefazolin given reported hx of anaphylaxis with penicillins  2. Follow up repeat pending blood cultures to assess for clearance   3.  Would suggest waiting to replace CVC until blood cultures are negative [id number]     Discussion:  Parker is a 51M with PMH including Celestino-en-Y gastric bypass and esophagojejunostomy complicated by short gut syndrome and chronic malnutrition requiring TPN, as well as several incidents of polymicrobial central line infection resulting in line replacements (previously followed in ID clinic with Dr. Buckner for this), admitted 1/8 under the instruction of Dr. Buckner due to positive blood cultures. Currently, blood cultures from 1/6-1/8 positive for Staph epidermidis (MSSE) and Staph hominis (oxacillin-susceptible) and blood cultures from 1/9 x1 positive for gram positive cocci clusters after 2 days of growth. Blood cultures 1/10 taken from the CVC prior to removal are NGTD. He was initially started on IV vancomycin 1/8 while at home by Dr. Buckner, and on  admission was changed to IV cefazolin (1/8-current); IV vancomycin was restarted 1/9 given pending susceptibilities and history of staph hominis bacteremia in 1/2021 which was oxacillin-resistant, however susceptibilities returned with oxacillin-susceptible staph hominis on 1/10 and it would be reasonable at this time to discontinue vancomycin. CVC was removed with IR on 1/10 and culture from the catheter tip is pending.         Recs given to primary team. Thank you for allowing us to participate in the care of this patient. ID will continue to follow.    This patient's care was discussed with Dr. Gustafson.    Sabina Foster, MS4  01/11/2022             Interval History:     Patient doing well after CVC removal yesterday. No fever symptoms or line site erythema. Line site feels a little tighter today than yesterday, but pt attributes this to the line removal yesterday. Reports no productive cough, CP, dyspnea, nausea, vomiting, diarrhea, abdominal pain.          Review of Systems:   Full 8 point ROS obtained, pertinent positives and negatives as above.            Current Antimicrobials   Cefazolin IV         Physical Exam:   Ranges for vital signs:  Temp:  [97.4  F (36.3  C)-98.3  F (36.8  C)] 98.3  F (36.8  C)  Pulse:  [52-71] 71  Resp:  [12-18] 18  BP: (114-144)/(70-97) 114/70  SpO2:  [97 %-100 %] 97 %    Intake/Output Summary (Last 24 hours) at 1/10/2022 0931  Last data filed at 1/10/2022 0659  Gross per 24 hour   Intake 1324.8 ml   Output --   Net 1324.8 ml     Exam:  GENERAL:  well-developed, standing at bedside and organizing personal belongings in no acute distress. Wearing sunglasses.  ENT:  Head is normocephalic, atraumatic. Oropharynx is moist.  LUNGS:  Unlabored breathing on room air.  CARDIOVASCULAR:  Regular rate.  ABDOMEN:  Non-distended.  EXT: Extremities warm and well perfused. No edema.  MSK/SKIN:  No acute rashes.  Right chest CVC site bandaged without tenderness or surrounding erythema.  NEURO: awake,  alert, interactive           Laboratory Data:   Reviewed.    Recent culture data:  1/10: blood cultures from CVC x2 NGTD; culture taken from catheter tip pending  1/9: blood cultures R arm (+) for gram+ cocci in clusters on day 2 of incubation; blood cultures L hand NGTD  1/8: blood cultures CVC 2/2 bottles positive for gram+ cocci clusters; blood cultures R hand 2/2 bottles positive for staph epidermidis   1/6: blood cultures R hand and CVC positive for staph epidermidis and staph hominis (both oxacillin susceptible)      Inflammatory Markers    Recent Labs   Lab Test 01/08/22  1430 11/16/21  1300 08/24/21  0855 07/13/21  1110 06/29/21  1016 06/14/21  1017 06/09/21  1044 03/25/21  1355 11/17/20  1445 07/28/20  1607 06/28/19  1345 05/14/19  1145 02/12/18  1400 01/23/18  0845 01/20/18  1030 10/02/17  1030 09/24/17  1900 06/29/17  1009 06/28/17  2222 03/12/17  0351 02/10/17  1520 11/01/16  1530   SED  --   --   --   --   --   --   --   --   --  10  --  13  --  10 11  --  31*  --  26* 17*  --  27*   CRP 52.0* <2.9 <2.9 7.8 <2.9 <2.9 <2.9 9.5*   < > <2.9   < >  --    < > <2.9 5.4   < > 26.6*   < > 53.0* 3.4   < > 8.4*    < > = values in this interval not displayed.       Hematology Studies    Recent Labs   Lab Test 01/10/22  1631 01/10/22  0745 01/09/22  0558 01/08/22  1828 01/08/22  1430 01/04/22  1150 07/13/21  1110 06/29/21  1016 06/14/21  1017 06/09/21  1044 06/01/21  1117 05/25/21  1147 05/19/21  1342 05/18/21  1015   WBC 7.6 8.0 12.5* 11.6* 4.6 8.2   < > 6.9 8.1 7.5 7.3   < > 6.1 6.2   ANEU  --   --   --   --   --   --   --  3.1 4.1 4.7 4.3  --  3.5 3.7   AEOS  --   --   --   --   --   --   --  0.2 0.2 0.3 0.1  --  0.2 0.2   HGB 10.9* 9.7* 9.8* 10.5* 11.3* 11.0*   < > 12.1* 12.0* 13.3 12.1*   < > 12.5* 12.6*   MCV 98 99 97 94 97 98   < > 96 97 96 94   < > 97 98    145* 130* 127* 145* 156   < > 178 158 169 174   < > 159 145*    < > = values in this interval not displayed.       Metabolic Studies     Recent  Labs   Lab Test 01/11/22  0956 01/10/22  1631 01/10/22  0745 01/09/22  0558 01/08/22  1828    141 143 140 139   POTASSIUM 3.5 3.8 4.0 3.9 3.0*   CHLORIDE 109 111* 110* 110* 106   CO2 25 28 28 26 27   BUN 14 12 18 14 9   CR 0.68 0.69 0.72 0.66 0.69   GFRESTIMATED >90 >90 >90 >90 >90       Hepatic Studies    Recent Labs   Lab Test 01/10/22  0745 01/09/22  0558 01/08/22  1828 01/08/22  1430 01/04/22  1150 12/29/21  1115   BILITOTAL 0.2 0.5 0.6 0.5 0.3 0.7  0.7   ALKPHOS 58 60 68 76 81 67   ALBUMIN 2.6* 2.5* 2.7* 3.2* 3.0* 3.0*   AST 11 11 16 21 21 20   ALT 22 28 31 37 50 39       Microbiology:  Culture   Date Value Ref Range Status   01/10/2022 No growth, less than 1 day  Preliminary   01/10/2022 No growth after 12 hours  Preliminary   01/10/2022 No growth after 12 hours  Preliminary   01/09/2022 Positive on the 2nd day of incubation (A)  Preliminary   01/09/2022 Gram positive cocci in clusters (AA)  Preliminary   01/09/2022 No growth after 12 hours  Preliminary   01/08/2022 Positive on the 1st day of incubation (A)  Preliminary   01/08/2022 Staphylococcus epidermidis (AA)  Preliminary     Comment:     2 of 2 bottles  Susceptibilities done on previous cultures   01/08/2022 Positive on the 1st day of incubation (A)  Preliminary   01/08/2022 Staphylococcus epidermidis (AA)  Preliminary     Comment:     2 of 2 bottles  Susceptibilities done on previous cultures   01/08/2022 Staphylococcus hominis (AA)  Preliminary     Comment:     2 of 2 bottles  Susceptibilities done on previous cultures   01/06/2022 Positive on the 1st day of incubation (A)  Final   01/06/2022 Staphylococcus epidermidis (AA)  Final     Comment:     2 of 2 bottles   01/06/2022 Staphylococcus hominis (AA)  Final     Comment:     1 of 2 bottles   01/06/2022 Positive on the 1st day of incubation (A)  Final   01/06/2022 Staphylococcus hominis (AA)  Final     Comment:     2 of 2 bottles  Susceptibilities done on previous cultures   01/06/2022  Staphylococcus epidermidis (AA)  Final     Comment:     2 of 2 bottles  Susceptibilities done on previous cultures   09/01/2021 No Growth  Final   09/01/2021 No Growth  Final   09/01/2021 No Growth  Final   08/16/2021 No Growth  Final   08/16/2021 No Growth  Final   08/16/2021 No Growth  Final   08/14/2021 No Growth  Final   08/14/2021 Positive on the 1st day of incubation (A)  Final   08/14/2021 Pseudomonas oryzihabitans (AA)  Final   08/14/2021 Pseudomonas oryzihabitans (AA)  Final     Comment:     1 of 2 bottles  Strain 2   08/10/2021 No Growth  Final   08/10/2021 No Growth  Final   08/10/2021 Positive on the 1st day of incubation (A)  Final   08/10/2021 Staphylococcus epidermidis (AA)  Final     Comment:     1 of 2 bottles       Urine Studies    Recent Labs   Lab Test 03/19/21  1644 07/28/20  1905 11/03/19  0025 10/04/19  1344 09/29/19  1625   LEUKEST Negative Negative Negative Trace* Negative   WBCU 1 <1 <1 1 0       Vancomycin Levels    Recent Labs   Lab Test 08/24/21  0855 03/23/21  0756 03/22/21  1139   VANCOMYCIN 7.2 13.3 10.6       Hepatitis B Testing   Recent Labs   Lab Test 09/20/17  0549   HEPBANG Nonreactive     Hepatitis C Testing     Hepatitis C Antibody   Date Value Ref Range Status   09/20/2017 Nonreactive NR^Nonreactive Final     Comment:     Assay performance characteristics have not been established for newborns,   infants, and children       Respiratory Virus Testing    No results found for: RS, FLUAG         Imaging:     EXAM: XR CHEST 2 VW  1/8/2022 1:45 PM      HISTORY:  fever        COMPARISON:  Chest x-ray 3/19/2021.                                                                   IMPRESSION:   1.  Hazy right basilar opacities, atelectasis versus infection.  2.  Trace bilateral pleural effusions.      Recent Results (from the past 4320 hour(s))   Echo Limited   Result Value    LVEF  55-60%    Narrative    [id number]  [id number]  [id number]  [id number]^MARJ^LIZZIE^[facility]  [facility]  Echocardiography Laboratory  [address] [place] [address]     Name: SARA HASSAN  MRN: [id number]  : 1970  Study Date: 2022 09:30 AM  Age: 51 yrs  Gender: Male  Patient Location: [id number]  Reason For Study: Endocarditis  Ordering Physician: LIZZIE MCMAHAN SOWMYA  Performed By: Iglesia Chaney RDCS     BSA: 2.1 m2  Height: 72 in  Weight: 198 lb  HR: 69  BP: 135/118 mmHg  ______________________________________________________________________________  Procedure  Limited Portable Echo Adult.  ______________________________________________________________________________  Interpretation Summary  No significant valvular abnormalities were noted.  No vegetation identified.  ______________________________________________________________________________  Left Ventricle  Left ventricular size is normal. Global and regional left ventricular function  is normal with an EF of 55-60%.     Right Ventricle  The right ventricle is normal size. Global right ventricular function is  normal.     Mitral Valve  The mitral valve is normal.     Aortic Valve  Aortic valve is normal in structure and function.     Tricuspid Valve  The tricuspid valve is normal.     Pulmonic Valve  The valve leaflets are not well visualized. On Doppler interrogation, there is  no significant stenosis or regurgitation.     Vessels  The aorta root is normal. The inferior vena cava was normal in size with  preserved respiratory variability. IVC diameter <2.1 cm collapsing >50% with  sniff suggests a normal RA pressure of 3 mmHg.     Pericardium  No pericardial effusion is present.     ______________________________________________________________________________  Doppler Measurements & Calculations  PA acc time: 0.13 sec     ______________________________________________________________________________  Report approved by: Shawanda COHEN 2022 11:27 AM

## 2022-01-11 NOTE — PROVIDER NOTIFICATION
Lab called with a positive blood culture and the [facility] 2 team notified.

## 2022-01-11 NOTE — PROGRESS NOTES
[facility]    Progress Note - [place] 2 Service        Date of Admission:  1/8/2022    Assessment & Plan   Parker Acevedo is a 51 year old male admitted on 1/8/2022. He has a history of recurrent bacteremia/line infections, history of Celestino-en-Y gastric bypass, esophagojejunostomy complicated by short gut syndrome, severe malnutrition on TPN, HTN, ADHD, GERD, asthma. He is admitted for Staphylococcus epidermidis and Staphylococcus hominis bacteremia, concerning for line infection. Right Cm line removed without any complications by IR on 1/10/2022.    Changes Today:  -- BC on 01/09 positive for GPC.   -- Per ID, not recommend replacing CVC until BC are negative x 48-72hrs (currently BC from 1/9 are 24hrs positive for GPC)  -- Continue with oxycodone 5-10mg TID PRN     #. CLABSI  #. Staph epidermidis and Staph hominis bacteremia (oxacillin-susceptible) in the setting of recurrent line infections     Recent history of fevers/chills, malaise, headaches, and myalgias since 1/4/22, for which blood cultures were obtained (1/6/22) from Cm and peripheral that both grew S. Epi and S. Hominis (both oxacillin sensitive). Repeat blood cultures from admission (1/8/22) also positive for GPC in clusters. Likely source is Cm catheter. As for other etiologies, he does not have a history of endocarditis/mechanical valve/pacemaker, TTE this admission not suggestive of vegetations, no history of joint replacement. Patient currently being managed on cefazolin with initial improvement in symptoms, Cm line removed for definitive source control on 1/10/2022. BC positive for GPC on 01/09. Pending final results.  -- ID consulted    - PO Vancomycin discontinued    - Continue with IV cefazolin (Hx of penicillin allergies)   - Continue to check BC every day until negative [id number]   - Not recommend replacing CVC until BC are negative x 48-72hrs (currently BC from 1/9 are 24hrs  positive for GPC)  --  Continue pta acetaminophen 500mg q4h prn  --  Due to BC (+) on 01/09, 2 BC ordered today  --  F/u Cm/peripheral BC from 01/10    #. Complex GI surgery history (Celestino-en-Y gastric bypass/esophagojejunostomy c/b short gut syndrome and malnutrition)  #. G tube placed  #. On PPN    Cm catheter removed on 1/10 for source control of Staphylococcus epidermidis bacteremia, as above. He normally got TPN as well as fluids (~1-2L LR daily) through the Cm. Refusing PPN due to high amounts of lipids.   -- Continue pta ondansetron 8mg q8h prn  -- Continue pta sucralfate 1g qid prn  -- Continue pta nystatin cream (for G tube site)  -- Patient agreeable with Clinimix PPN, but not any lipid-containing PPN diet. Coordinated with Pharmacist and Nutritionist  -- Hold 1L NS bolus per day PRN since patient     #. Headaches + myalgias amidst underlying bacteremia  #. H/o chronic pain    Patient endorsing persistent presence of poorly controlled headache and myalgias since admission  - Scheduled tylenol 500mg q4h  - Continue pta fentanyl patch q48 hours  - Continue with PTA home oxycodone 5-10mg TID     Chronic/stable  #Chronic normocytic anemia, stable  Baseline Hgb ~10-12.  - continue to monitor    #Anxiety  - continue pta lorazepam 1mg daily prn     #ADHD  - continue pta Adderall 20mg daily     #HTN  - continue pta carvedilol 6.25mg bid      #Asthma  - continue pta albuterol prn     #GERD  - continue pta pantoprazole 40mg daily     Diet: Snacks/Supplements Adult: Other; Ensure Enlive (amie/strawberry) @ 10 and 2.  Please also send Powerade/Gatorade (8 oz) at each of these snack times as well.; Between Meals  Regular Diet Adult  parenteral nutrition - ADULT compounded formula CYCLE    DVT Prophylaxis: Pneumatic Compression Devices  Sanchez Catheter: Not present  Fluids: no continuous fluids  Central Lines: None  Code Status: Full Code      Disposition Plan   Expected Discharge: >2 days, pending ID  consult, removal of Cm, and coordination of abx plan  Anticipated discharge location:  Awaiting care coordination huddle     The patient's care was discussed with the Dr. Castro.    Daniela Burrows MD  Internal Medicine PGY-1    [address]  Securely message with the Vocera Web Console (learn more here)  Text page via [employer] Paging/Directory    Please see sign in/sign out for up to date coverage information    Clinically Significant Risk Factors Present on Admission             # Overweight: last Body mass index is 27 kg/m .      ______________________________________________________________________    Interval History   No acute events overnight. Reports that headaches have improved. Still has some nausea but has gone down some. No fevers, chills, shortness of breath, chest pain, abdominal pain, vomiting. He is eager to have his Cm catheter removed.     Data reviewed today: I reviewed all medications, new labs and imaging results over the last 24 hours.    Physical Exam   Vital Signs: Temp: 98.3  F (36.8  C) Temp src: Oral BP: 114/70 Pulse: 71   Resp: 18 SpO2: 97 % O2 Device: None (Room air)    Weight: 193 lbs 9.6 oz  General: sitting up in bed, NAD  HEENT: no scleral icterus or conjunctival injection, oropharynx clear  Resp: clear to auscultation bilaterally, no crackles or wheezes  Cardiac: regular rate and rhythm, no murmurs  Chest: right Cm catheter without surrounding purulence, tenderness, erythema  Abdomen: soft, non-tender, non-distended. Left G tube without purulence.   Extremities: warm, no lower extremity edema  MSK: normal muscle bulk.  Skin: no lesions on exposed skin  Neuro: alert, oriented x4. No focal neuro deficits  Psych: appropriate mood and affect, answers questions appropriately    Data   Recent Labs   Lab 01/11/22  0956 01/11/22  0702 01/11/22  0154 01/10/22  1812 01/10/22  1631 01/10/22  1223 01/10/22  0745  01/09/22  1307 01/09/22  0558 01/08/22  1828 01/08/22  1430   WBC  --   --   --   --  7.6  --  8.0  --  12.5*   < > 4.6   HGB  --   --   --   --  10.9*  --  9.7*  --  9.8*   < > 11.3*   MCV  --   --   --   --  98  --  99  --  97   < > 97   PLT  --   --   --   --  173  --  145*  --  130*   < > 145*   INR  --   --   --   --   --   --  1.15  --  1.27*  --  1.07     --   --   --  141  --  143  --  140   < > 141   POTASSIUM 3.5  --   --   --  3.8  --  4.0  --  3.9   < > 3.9   CHLORIDE 109  --   --   --  111*  --  110*  --  110*   < > 108   CO2 25  --   --   --  28  --  28  --  26   < > 29   BUN 14  --   --   --  12  --  18  --  14   < > 10   CR 0.68  --   --   --  0.69  --  0.72  --  0.66   < > 0.69   ANIONGAP 7  --   --   --  2*  --  5  --  4   < > 4   SILAS 8.6  --   --   --  8.4*  --  8.2*  --  8.8   < > 8.6   * 73 102*   < > 102*   < > 98   < > 114*   < > 96   ALBUMIN  --   --   --   --   --   --  2.6*  --  2.5*   < > 3.2*   PROTTOTAL  --   --   --   --   --   --  6.2*  --  6.0*   < > 7.0   BILITOTAL  --   --   --   --   --   --  0.2  --  0.5   < > 0.5   ALKPHOS  --   --   --   --   --   --  58  --  60   < > 76   ALT  --   --   --   --   --   --  22  --  28   < > 37   AST  --   --   --   --   --   --  11  --  11   < > 21    < > = values in this interval not displayed.

## 2022-01-12 ENCOUNTER — TELEPHONE (OUTPATIENT)
Dept: GASTROENTEROLOGY | Facility: CLINIC | Age: 52
End: 2022-01-12
Payer: COMMERCIAL

## 2022-01-12 ENCOUNTER — HOSPITAL ENCOUNTER (OUTPATIENT)
Facility: AMBULATORY SURGERY CENTER | Age: 52
End: 2022-01-12
Attending: INTERNAL MEDICINE
Payer: COMMERCIAL

## 2022-01-12 LAB
ANION GAP SERPL CALCULATED.3IONS-SCNC: 4 MMOL/L (ref 3–14)
BACTERIA BLD CULT: ABNORMAL
BACTERIA SPEC CULT: ABNORMAL
BUN SERPL-MCNC: 15 MG/DL (ref 7–30)
CALCIUM SERPL-MCNC: 8.4 MG/DL (ref 8.5–10.1)
CHLORIDE BLD-SCNC: 110 MMOL/L (ref 94–109)
CO2 SERPL-SCNC: 28 MMOL/L (ref 20–32)
CREAT SERPL-MCNC: 0.65 MG/DL (ref 0.66–1.25)
GFR SERPL CREATININE-BSD FRML MDRD: >90 ML/MIN/1.73M2
GLUCOSE BLD-MCNC: 86 MG/DL (ref 70–99)
GLUCOSE BLDC GLUCOMTR-MCNC: 85 MG/DL (ref 70–99)
GLUCOSE BLDC GLUCOMTR-MCNC: 94 MG/DL (ref 70–99)
GLUCOSE BLDC GLUCOMTR-MCNC: 94 MG/DL (ref 70–99)
HOLD SPECIMEN: NORMAL
MAGNESIUM SERPL-MCNC: 2.2 MG/DL (ref 1.6–2.3)
PHOSPHATE SERPL-MCNC: 3 MG/DL (ref 2.5–4.5)
POTASSIUM BLD-SCNC: 3.4 MMOL/L (ref 3.4–5.3)
SODIUM SERPL-SCNC: 142 MMOL/L (ref 133–144)

## 2022-01-12 PROCEDURE — 120N000002 HC R&B MED SURG/OB UMMC

## 2022-01-12 PROCEDURE — 82310 ASSAY OF CALCIUM: CPT | Performed by: INTERNAL MEDICINE

## 2022-01-12 PROCEDURE — 250N000013 HC RX MED GY IP 250 OP 250 PS 637

## 2022-01-12 PROCEDURE — 250N000013 HC RX MED GY IP 250 OP 250 PS 637: Performed by: STUDENT IN AN ORGANIZED HEALTH CARE EDUCATION/TRAINING PROGRAM

## 2022-01-12 PROCEDURE — 250N000013 HC RX MED GY IP 250 OP 250 PS 637: Performed by: EMERGENCY MEDICINE

## 2022-01-12 PROCEDURE — 99232 SBSQ HOSP IP/OBS MODERATE 35: CPT | Mod: GC | Performed by: INTERNAL MEDICINE

## 2022-01-12 PROCEDURE — 250N000011 HC RX IP 250 OP 636: Performed by: EMERGENCY MEDICINE

## 2022-01-12 PROCEDURE — 250N000011 HC RX IP 250 OP 636: Performed by: STUDENT IN AN ORGANIZED HEALTH CARE EDUCATION/TRAINING PROGRAM

## 2022-01-12 PROCEDURE — 36415 COLL VENOUS BLD VENIPUNCTURE: CPT | Performed by: INTERNAL MEDICINE

## 2022-01-12 PROCEDURE — 250N000011 HC RX IP 250 OP 636

## 2022-01-12 RX ADMIN — ACETAMINOPHEN 500 MG: 325 SOLUTION ORAL at 17:15

## 2022-01-12 RX ADMIN — Medication 1 DROP: at 22:46

## 2022-01-12 RX ADMIN — SUCRALFATE 1 G: 1 SUSPENSION ORAL at 09:28

## 2022-01-12 RX ADMIN — CARVEDILOL 6.25 MG: 3.12 TABLET, FILM COATED ORAL at 21:05

## 2022-01-12 RX ADMIN — ONDANSETRON 8 MG: 4 TABLET, ORALLY DISINTEGRATING ORAL at 22:45

## 2022-01-12 RX ADMIN — OXYCODONE HYDROCHLORIDE 10 MG: 5 SOLUTION ORAL at 02:29

## 2022-01-12 RX ADMIN — LORAZEPAM 1 MG: 1 TABLET ORAL at 21:05

## 2022-01-12 RX ADMIN — Medication 1 DROP: at 21:13

## 2022-01-12 RX ADMIN — CEFAZOLIN SODIUM 2 G: 2 INJECTION, SOLUTION INTRAVENOUS at 23:24

## 2022-01-12 RX ADMIN — OXYCODONE HYDROCHLORIDE 10 MG: 5 SOLUTION ORAL at 22:00

## 2022-01-12 RX ADMIN — Medication 1 DROP: at 14:47

## 2022-01-12 RX ADMIN — OXYCODONE HYDROCHLORIDE 10 MG: 5 SOLUTION ORAL at 14:47

## 2022-01-12 RX ADMIN — ACETAMINOPHEN 500 MG: 325 SOLUTION ORAL at 09:28

## 2022-01-12 RX ADMIN — DEXTROAMPHETAMINE SACCHARATE, AMPHETAMINE ASPARTATE, DEXTROAMPHETAMINE SULFATE AND AMPHETAMINE SULFATE 20 MG: 2.5; 2.5; 2.5; 2.5 TABLET ORAL at 09:29

## 2022-01-12 RX ADMIN — CEFAZOLIN SODIUM 2 G: 2 INJECTION, SOLUTION INTRAVENOUS at 09:36

## 2022-01-12 RX ADMIN — CEFAZOLIN SODIUM 2 G: 2 INJECTION, SOLUTION INTRAVENOUS at 17:15

## 2022-01-12 RX ADMIN — OXYCODONE HYDROCHLORIDE 10 MG: 5 SOLUTION ORAL at 09:28

## 2022-01-12 RX ADMIN — NYSTATIN: 100000 CREAM TOPICAL at 21:05

## 2022-01-12 RX ADMIN — SUCRALFATE 1 G: 1 SUSPENSION ORAL at 21:05

## 2022-01-12 RX ADMIN — ONDANSETRON 8 MG: 4 TABLET, ORALLY DISINTEGRATING ORAL at 14:47

## 2022-01-12 RX ADMIN — ACETAMINOPHEN 500 MG: 325 SOLUTION ORAL at 23:03

## 2022-01-12 RX ADMIN — CEFAZOLIN SODIUM 2 G: 2 INJECTION, SOLUTION INTRAVENOUS at 00:17

## 2022-01-12 RX ADMIN — SUCRALFATE 1 G: 1 SUSPENSION ORAL at 13:05

## 2022-01-12 RX ADMIN — CARVEDILOL 6.25 MG: 3.12 TABLET, FILM COATED ORAL at 09:29

## 2022-01-12 RX ADMIN — ACETAMINOPHEN 500 MG: 325 SOLUTION ORAL at 02:29

## 2022-01-12 RX ADMIN — FENTANYL 1 PATCH: 25 PATCH, EXTENDED RELEASE TRANSDERMAL at 21:13

## 2022-01-12 ASSESSMENT — ACTIVITIES OF DAILY LIVING (ADL)
ADLS_ACUITY_SCORE: 9

## 2022-01-12 NOTE — PROGRESS NOTES
Admitted/transferred from: 7D  2 RN full   skin assessment completed by Karine Rankin, RN and COMFORT Lyon RN.  Skin assessment finding: issues found azevedo removal site CDI, G tube, 2 PIV, scrapes on bilateral forearms   Interventions/actions: skin interventions nutrition (TPN), hydration, activity OOB, pt education

## 2022-01-12 NOTE — TELEPHONE ENCOUNTER
Caller: Writer called Parker HALI Curtis     Procedure: Lower Endoscopy [Colonoscopy]    Ordering Provider: SEAN    RESOLUTION: RESCHEDULED     03/09/22 [facility] SEAN  03/05/22 PH 1030AM COVID TEST  Any Staff messages sent regarding case?: Yes    ----- Message from Rika Beatty RN sent at 1/11/2022  9:12 AM CST -----  Regarding: Cancel/Reschedule Sean George    Good Morning,    This patient is in the hospital and will need to be rescheduled. I have removed him from the schedule.    Thank you,    Rika Beatty, RN  [facility]

## 2022-01-12 NOTE — PLAN OF CARE
"/77 (BP Location: Right arm)   Pulse 74   Temp 97.5  F (36.4  C) (Oral)   Resp 20   Ht 1.803 m (5' 11\")   Wt 87.8 kg (193 lb 9.6 oz)   SpO2 99%   BMI 27.00 kg/m    Patient slept okay through the night  Requested pain meds to be given around 2:30,  TPN running, ATB given.   His G-tube site is pink/reddened and moist. Drain gauze applied. Patient takes care of the site himself.   Continue with current POC  Problem: Adult Inpatient Plan of Care  Goal: Plan of Care Review  Outcome: No Change  Flowsheets (Taken 1/12/2022 0219)  Plan of Care Reviewed With: patient     Problem: Adult Inpatient Plan of Care  Goal: Absence of Hospital-Acquired Illness or Injury  Outcome: No Change     Problem: Adult Inpatient Plan of Care  Goal: Optimal Comfort and Wellbeing  Outcome: No Change     Problem: Adult Inpatient Plan of Care  Goal: Readiness for Transition of Care  Outcome: No Change     Problem: Infection  Goal: Absence of Infection Signs and Symptoms  Outcome: No Change     Problem: Discharge Planning  Goal: Discharge Planning (Adult, OB, Behavioral, Peds)  Outcome: No Change     "

## 2022-01-12 NOTE — PROGRESS NOTES
Pt  has been very active, ambulated outside of hospital multiple times this shift. VSS on RA, afebrile. Pt normally just came back when needed to be hooked up to IV antibx or needing pain med. Alert and oriented, pain med given for abd pain per pt's request. Pt has been refusing to have TPN infusion, stated that he wanted to wait for the central line and wanted to attempt on eating. Primary team and pharmacy are aware. Pt declined breakfast, wife ordered a tray to room but pt has not touch food on tray yet. BG 86 and 94 for day shift. Zofran given for nausea, no emesis reported.

## 2022-01-12 NOTE — PROGRESS NOTES
CLINICAL NUTRITION SERVICES - BRIEF NOTE      Notified by Pharmacy that pt's central line was pulled on 1/11 and IV nutrition was switched from CPN to PPN with Clinimix formula consisting of 5% Dex/4.25% AA, plus 250 ml lipids TID (Mon/Wed/Fri)    Regimen consist of goal vol of 2000 ml/day with 100 g Dex daily (340 kcal), 85 g AA (340 kcal) and 250 mL of 20% IV lipids TID  which provides 894 kcals/day (10 kcal/kg/day),0.9 g PRO/kg/day, GIR 0.7 with 24% kcals from fat.      No further intervention at this time. RD to continue to follow.    Araseli Eden RD,LD  7D pager [phone]  7B pager [phone]

## 2022-01-12 NOTE — PLAN OF CARE
Transfer  D: Reason for transfer to was, off service patient. Condition of patient prior to transfer was stable..  I: Report called to . Transferred to  at 2100 via walking. Chart and medications sent with patient. Belongings remain with patient. Teaching as documented in electronic flowsheet.  A: Patient stable, requesting oxy once he is settled on 7B  P:

## 2022-01-12 NOTE — PLAN OF CARE
"/84 (BP Location: Right arm)   Pulse 66   Temp 97.5  F (36.4  C) (Oral)   Resp 20   Ht 1.803 m (5' 11\")   Wt 87.8 kg (193 lb 9.6 oz)   SpO2 100%   BMI 27.00 kg/m       Pt transferred to floor around 2125 from 7D  Reason: bacteremia- line associated  Status: VSS  Pain/Nausea: C/O headache and abd pain- PRN oxycodone given. Continuous nausea- oral Zofran x1.   Mobility: Up ad vitor  Diet: Regular- poor appetite. TPN continuous @100mL/hr in PIV  Labs: Reviewed. WDL  LDAs: L PIV- TPN, L PIV-SL  Skin/incisions: Intact ex G tube, PIV, scrapes on bilateral forearms- see full skin note  Neuro: A&Ox4, denies N/T. Calls appropriately  Respiratory: LS clear, RA sating mid-high 90s. Denies SOB  Cardiac: WDL, denies chest pain  GI/: Voiding spontaneously without difficulty. LBM 1/9 per pt report, +BS  New Changes: No acute changes this shift  Plan: Continue with POC  "

## 2022-01-12 NOTE — PROGRESS NOTES
Physician Attestation:  This patient has been seen, examined and evaluated by me, Koko Gustafson MD.  Discussed with the medical student and agree with the findings, assessment and plan in this note. The medical student is acting as a scribe.    Key points/comments: agree with 7-day course of cefazolin from the time of CVC removal; reasonable to place new line from ID perspective give cultures negative 1/10 onward thus far.    Koko Gustafson MD  ID Staff Physician  01/12/2022          [facility] ID Service: Follow-up Note      Patient:  Parker Acevedo, Date of birth 1970, Medical record number [id number]  Date of Visit:  January 12, 2022  Reason for consult: Bacteremia         Assessment and Recommendations:     ID Problem list:  1. CLABSI c/b insertion site infection, s/p CVC removal 1/10  2. Staph epidermidis bacteremia (oxacillin-susceptible)  3. Staph hominis bacteremia (oxacillin-susceptible)  4. Short gut syndrome, on TPN  5. Hx of prior CLABSI    Recs:  1. Continue IV cefazolin - plan to discharge on IV cefazolin to complete a 7 day course from the time of line removal (1/10-1/16)  2. From ID perspective, ok to replace CVC today 1/12 given 48hrs of no growth on cultures from 1/10. Would suggest re-siting CVC to allow right chest to heal as there were signs of mild cellulitis at the right chest insertion site on his initial presentation.  3. Continue to follow cultures to ensure clearance, could call back ID if new positive cultures  4. Recommend at least weekly monitoring CBC, CMP while on IV antibiotics  5. Can continue to follow with his outpatient ID provider (Dr. Buckner) for further management       Discussion:  Parker is a 51M with PMH including Celestino-en-Y gastric bypass and esophagojejunostomy complicated by short gut syndrome and chronic malnutrition requiring TPN, as well as several incidents of polymicrobial central line infection resulting in line replacements (previously followed in  ID clinic with Dr. Buckner for this), admitted 1/8 under the instruction of Dr. Buckner due to positive blood cultures. Currently, blood cultures from 1/6-1/8 positive for Staph epidermidis (MSSE) and Staph hominis (oxacillin-susceptible) and blood cultures from 1/9 x1 positive for gram positive cocci clusters after 2 days of growth. Blood cultures 1/10 are NGTD. He was initially started on IV vancomycin on 1/8 while at home by Dr. Buckner, and on admission was changed to IV cefazolin (1/8-current); IV vancomycin was restarted 1/9 given pending susceptibilities and history of staph hominis bacteremia in 1/2021 which was oxacillin-resistant, however susceptibilities returned with oxacillin-susceptible staph hominis on 1/10 so vancomycin was discontinued at that time. CVC was removed with IR on 1/10 and aerobic culture from the catheter tip has grown <15 CFU staph epi. Currently seems as though source control has been achieved with blood cultures tentatively negative from time of CVC removal 1/10 onward; thus could plan for 7-day course of IV cefazolin from the time of CVC removal.         Recs given to primary team. Thank you for allowing us to participate in the care of this patient. ID will sign off at this time.    This patient's care was discussed with Dr. Gustafson.    Sabina Foster, MS4  01/12/2022             Interval History:     Patient doing well and expresses frustration with still being in the hospital despite feeling he has improved significantly. No fever symptoms or line site erythema. Line site discomfort which he experienced yesterday following CVC removal has now resolved today. Reports no productive cough, CP, dyspnea, nausea, vomiting, diarrhea, abdominal pain.          Review of Systems:   Full 8 point ROS obtained, pertinent positives and negatives as above.            Current Antimicrobials   Cefazolin IV         Physical Exam:   Ranges for vital signs:  Temp:  [97.5  F (36.4  C)-98.8  F (37.1  C)] 97.6   F (36.4  C)  Pulse:  [66-74] 73  Resp:  [18-20] 18  BP: (114-131)/(70-85) 130/78  SpO2:  [97 %-100 %] 98 %    Intake/Output Summary (Last 24 hours) at 1/10/2022 0931  Last data filed at 1/10/2022 0659  Gross per 24 hour   Intake 1324.8 ml   Output --   Net 1324.8 ml     Exam:  GENERAL:  well-developed, standing at bedside and organizing personal belongings in no acute distress.    ENT:  Head is normocephalic, atraumatic. Oropharynx is moist.  LUNGS:  Unlabored breathing on room air.  CARDIOVASCULAR:  Regular rate.  ABDOMEN:  Non-distended.  EXT: Extremities warm and well perfused. No edema.  MSK/SKIN:  No acute rashes.  Right chest CVC site bandaged without tenderness or surrounding erythema.  NEURO: awake, alert, interactive           Laboratory Data:   Reviewed.    Recent culture data:  1/11: blood cultures x2 peripheral NGTD  1/10: blood cultures from CVC x2 NGTD; aerobic culture taken from catheter tip grew <15 CFU staph epi  1/9: blood cultures R arm (+) for gram+ cocci in clusters on day 2 of incubation; blood cultures L hand NGTD  1/8: blood cultures CVC 2/2 bottles positive for gram+ cocci clusters; blood cultures R hand 2/2 bottles positive for staph epidermidis   1/6: blood cultures R hand and CVC positive for staph epidermidis and staph hominis (both oxacillin susceptible)      Inflammatory Markers    Recent Labs   Lab Test 01/08/22  1430 11/16/21  1300 08/24/21  0855 07/13/21  1110 06/29/21  1016 06/14/21  1017 06/09/21  1044 03/25/21  1355 11/17/20  1445 07/28/20  1607 06/28/19  1345 05/14/19  1145 02/12/18  1400 01/23/18  0845 01/20/18  1030 10/02/17  1030 09/24/17  1900 06/29/17  1009 06/28/17  2222 03/12/17  0351 02/10/17  1520 11/01/16  1530   SED  --   --   --   --   --   --   --   --   --  10  --  13  --  10 11  --  31*  --  26* 17*  --  27*   CRP 52.0* <2.9 <2.9 7.8 <2.9 <2.9 <2.9 9.5*   < > <2.9   < >  --    < > <2.9 5.4   < > 26.6*   < > 53.0* 3.4   < > 8.4*    < > = values in this interval  not displayed.       Hematology Studies    Recent Labs   Lab Test 01/10/22  1631 01/10/22  0745 01/09/22  0558 01/08/22  1828 01/08/22  1430 01/04/22  1150 07/13/21  1110 06/29/21  1016 06/14/21  1017 06/09/21  1044 06/01/21  1117 05/25/21  1147 05/19/21  1342 05/18/21  1015   WBC 7.6 8.0 12.5* 11.6* 4.6 8.2   < > 6.9 8.1 7.5 7.3   < > 6.1 6.2   ANEU  --   --   --   --   --   --   --  3.1 4.1 4.7 4.3  --  3.5 3.7   AEOS  --   --   --   --   --   --   --  0.2 0.2 0.3 0.1  --  0.2 0.2   HGB 10.9* 9.7* 9.8* 10.5* 11.3* 11.0*   < > 12.1* 12.0* 13.3 12.1*   < > 12.5* 12.6*   MCV 98 99 97 94 97 98   < > 96 97 96 94   < > 97 98    145* 130* 127* 145* 156   < > 178 158 169 174   < > 159 145*    < > = values in this interval not displayed.       Metabolic Studies     Recent Labs   Lab Test 01/12/22  0718 01/11/22  0956 01/10/22  1631 01/10/22  0745 01/09/22  0558    141 141 143 140   POTASSIUM 3.4 3.5 3.8 4.0 3.9   CHLORIDE 110* 109 111* 110* 110*   CO2 28 25 28 28 26   BUN 15 14 12 18 14   CR 0.65* 0.68 0.69 0.72 0.66   GFRESTIMATED >90 >90 >90 >90 >90       Hepatic Studies    Recent Labs   Lab Test 01/10/22  0745 01/09/22  0558 01/08/22  1828 01/08/22  1430 01/04/22  1150 12/29/21  1115   BILITOTAL 0.2 0.5 0.6 0.5 0.3 0.7  0.7   ALKPHOS 58 60 68 76 81 67   ALBUMIN 2.6* 2.5* 2.7* 3.2* 3.0* 3.0*   AST 11 11 16 21 21 20   ALT 22 28 31 37 50 39       Microbiology:  Culture   Date Value Ref Range Status   01/11/2022 No growth after 12 hours  Preliminary   01/11/2022 No growth after 12 hours  Preliminary   01/10/2022 No growth, less than 1 day  Preliminary   01/10/2022 No growth after 1 day  Preliminary   01/10/2022 No growth after 1 day  Preliminary   01/09/2022 Positive on the 2nd day of incubation (A)  Preliminary   01/09/2022 Gram positive cocci in clusters (AA)  Preliminary   01/09/2022 No growth after 1 day  Preliminary   01/08/2022 Positive on the 1st day of incubation (A)  Preliminary   01/08/2022  Staphylococcus epidermidis (AA)  Preliminary     Comment:     2 of 2 bottles  Susceptibilities done on previous cultures   01/08/2022 Positive on the 1st day of incubation (A)  Final   01/08/2022 Staphylococcus epidermidis (AA)  Final     Comment:     2 of 2 bottles  Susceptibilities done on previous cultures   01/08/2022 Staphylococcus hominis (AA)  Final     Comment:     2 of 2 bottles  Susceptibilities done on previous cultures   01/06/2022 Positive on the 1st day of incubation (A)  Final   01/06/2022 Staphylococcus epidermidis (AA)  Final     Comment:     2 of 2 bottles   01/06/2022 Staphylococcus hominis (AA)  Final     Comment:     1 of 2 bottles   01/06/2022 Positive on the 1st day of incubation (A)  Final   01/06/2022 Staphylococcus hominis (AA)  Final     Comment:     2 of 2 bottles  Susceptibilities done on previous cultures   01/06/2022 Staphylococcus epidermidis (AA)  Final     Comment:     2 of 2 bottles  Susceptibilities done on previous cultures   09/01/2021 No Growth  Final   09/01/2021 No Growth  Final   09/01/2021 No Growth  Final   08/16/2021 No Growth  Final   08/16/2021 No Growth  Final   08/16/2021 No Growth  Final   08/14/2021 No Growth  Final   08/14/2021 Positive on the 1st day of incubation (A)  Final   08/14/2021 Pseudomonas oryzihabitans (AA)  Final   08/14/2021 Pseudomonas oryzihabitans (AA)  Final     Comment:     1 of 2 bottles  Strain 2   08/10/2021 No Growth  Final   08/10/2021 No Growth  Final   08/10/2021 Positive on the 1st day of incubation (A)  Final   08/10/2021 Staphylococcus epidermidis (AA)  Final     Comment:     1 of 2 bottles       Urine Studies    Recent Labs   Lab Test 03/19/21  1644 07/28/20  1905 11/03/19  0025 10/04/19  1344 09/29/19  1625   LEUKEST Negative Negative Negative Trace* Negative   WBCU 1 <1 <1 1 0       Vancomycin Levels    Recent Labs   Lab Test 08/24/21  0855 03/23/21  0756 03/22/21  1139   VANCOMYCIN 7.2 13.3 10.6       Hepatitis B Testing   Recent  Labs   Lab Test 17  0549   HEPBANG Nonreactive     Hepatitis C Testing     Hepatitis C Antibody   Date Value Ref Range Status   2017 Nonreactive NR^Nonreactive Final     Comment:     Assay performance characteristics have not been established for newborns,   infants, and children       Respiratory Virus Testing    No results found for: RS, FLUAG         Imaging:     EXAM: XR CHEST 2 VW  2022 1:45 PM      HISTORY:  fever        COMPARISON:  Chest x-ray 3/19/2021.                                                                   IMPRESSION:   1.  Hazy right basilar opacities, atelectasis versus infection.  2.  Trace bilateral pleural effusions.      Recent Results (from the past 4320 hour(s))   Echo Limited   Result Value    LVEF  55-60%    Narrative    [id number]  [id number]  [id number]  [id number]^MARJ^LIZZIE^SOWMYA     [facility],[place]  Echocardiography Laboratory  [address] [place] [id number]     Name: SARA HASSAN  MRN: [id number]  : 1970  Study Date: 2022 09:30 AM  Age: 51 yrs  Gender: Male  Patient Location: [facility]  Reason For Study: Endocarditis  Ordering Physician: LIZZIE MCMAHAN  Performed By: Iglesia Chaney RDCS     BSA: 2.1 m2  Height: 72 in  Weight: 198 lb  HR: 69  BP: 135/118 mmHg  ______________________________________________________________________________  Procedure  Limited Portable Echo Adult.  ______________________________________________________________________________  Interpretation Summary  No significant valvular abnormalities were noted.  No vegetation identified.  ______________________________________________________________________________  Left Ventricle  Left ventricular size is normal. Global and regional left ventricular function  is normal with an EF of 55-60%.     Right Ventricle  The right ventricle is normal size. Global right ventricular function is  normal.     Mitral Valve  The mitral valve is  normal.     Aortic Valve  Aortic valve is normal in structure and function.     Tricuspid Valve  The tricuspid valve is normal.     Pulmonic Valve  The valve leaflets are not well visualized. On Doppler interrogation, there is  no significant stenosis or regurgitation.     Vessels  The aorta root is normal. The inferior vena cava was normal in size with  preserved respiratory variability. IVC diameter <2.1 cm collapsing >50% with  sniff suggests a normal RA pressure of 3 mmHg.     Pericardium  No pericardial effusion is present.     ______________________________________________________________________________  Doppler Measurements & Calculations  PA acc time: 0.13 sec     ______________________________________________________________________________  Report approved by: Shawanda COHEN 01/09/2022 11:27 AM

## 2022-01-13 ENCOUNTER — HOME INFUSION (PRE-WILLOW HOME INFUSION) (OUTPATIENT)
Dept: PHARMACY | Facility: CLINIC | Age: 52
End: 2022-01-13

## 2022-01-13 ENCOUNTER — APPOINTMENT (OUTPATIENT)
Dept: INTERVENTIONAL RADIOLOGY/VASCULAR | Facility: CLINIC | Age: 52
DRG: 314 | End: 2022-01-13
Attending: PHYSICIAN ASSISTANT
Payer: COMMERCIAL

## 2022-01-13 VITALS
RESPIRATION RATE: 18 BRPM | SYSTOLIC BLOOD PRESSURE: 146 MMHG | BODY MASS INDEX: 27.1 KG/M2 | OXYGEN SATURATION: 99 % | WEIGHT: 193.6 LBS | HEART RATE: 69 BPM | HEIGHT: 71 IN | DIASTOLIC BLOOD PRESSURE: 96 MMHG | TEMPERATURE: 98.8 F

## 2022-01-13 LAB
ENTEROCOCCUS FAECALIS: NOT DETECTED
ENTEROCOCCUS FAECIUM: NOT DETECTED
GLUCOSE BLDC GLUCOMTR-MCNC: 107 MG/DL (ref 70–99)
GLUCOSE BLDC GLUCOMTR-MCNC: 77 MG/DL (ref 70–99)
LISTERIA SPECIES (DETECTED/NOT DETECTED): NOT DETECTED
STAPHYLOCOCCUS AUREUS: NOT DETECTED
STAPHYLOCOCCUS EPIDERMIDIS: NOT DETECTED
STAPHYLOCOCCUS LUGDUNENSIS: NOT DETECTED
STAPHYLOCOCCUS SPECIES: DETECTED
STREPTOCOCCUS AGALACTIAE: NOT DETECTED
STREPTOCOCCUS ANGINOSUS GROUP: NOT DETECTED
STREPTOCOCCUS PNEUMONIAE: NOT DETECTED
STREPTOCOCCUS PYOGENES: NOT DETECTED
STREPTOCOCCUS SPECIES: NOT DETECTED

## 2022-01-13 PROCEDURE — 272N000504 HC NEEDLE CR4

## 2022-01-13 PROCEDURE — 250N000011 HC RX IP 250 OP 636

## 2022-01-13 PROCEDURE — 250N000013 HC RX MED GY IP 250 OP 250 PS 637: Performed by: STUDENT IN AN ORGANIZED HEALTH CARE EDUCATION/TRAINING PROGRAM

## 2022-01-13 PROCEDURE — 36415 COLL VENOUS BLD VENIPUNCTURE: CPT

## 2022-01-13 PROCEDURE — 99152 MOD SED SAME PHYS/QHP 5/>YRS: CPT

## 2022-01-13 PROCEDURE — 36558 INSERT TUNNELED CV CATH: CPT | Mod: RT | Performed by: RADIOLOGY

## 2022-01-13 PROCEDURE — 250N000011 HC RX IP 250 OP 636: Performed by: STUDENT IN AN ORGANIZED HEALTH CARE EDUCATION/TRAINING PROGRAM

## 2022-01-13 PROCEDURE — 272N000602 HC WOUND GLUE CR1

## 2022-01-13 PROCEDURE — C1751 CATH, INF, PER/CENT/MIDLINE: HCPCS

## 2022-01-13 PROCEDURE — 02H633Z INSERTION OF INFUSION DEVICE INTO RIGHT ATRIUM, PERCUTANEOUS APPROACH: ICD-10-PCS | Performed by: RADIOLOGY

## 2022-01-13 PROCEDURE — 77001 FLUOROGUIDE FOR VEIN DEVICE: CPT | Mod: 26 | Performed by: RADIOLOGY

## 2022-01-13 PROCEDURE — 250N000011 HC RX IP 250 OP 636: Performed by: PHYSICIAN ASSISTANT

## 2022-01-13 PROCEDURE — C1769 GUIDE WIRE: HCPCS

## 2022-01-13 PROCEDURE — 99239 HOSP IP/OBS DSCHRG MGMT >30: CPT | Mod: GC | Performed by: INTERNAL MEDICINE

## 2022-01-13 PROCEDURE — 250N000013 HC RX MED GY IP 250 OP 250 PS 637: Performed by: EMERGENCY MEDICINE

## 2022-01-13 PROCEDURE — 250N000013 HC RX MED GY IP 250 OP 250 PS 637

## 2022-01-13 PROCEDURE — 99152 MOD SED SAME PHYS/QHP 5/>YRS: CPT | Performed by: RADIOLOGY

## 2022-01-13 PROCEDURE — 0JH63XZ INSERTION OF TUNNELED VASCULAR ACCESS DEVICE INTO CHEST SUBCUTANEOUS TISSUE AND FASCIA, PERCUTANEOUS APPROACH: ICD-10-PCS | Performed by: RADIOLOGY

## 2022-01-13 PROCEDURE — 250N000011 HC RX IP 250 OP 636: Performed by: RADIOLOGY

## 2022-01-13 PROCEDURE — 87040 BLOOD CULTURE FOR BACTERIA: CPT

## 2022-01-13 PROCEDURE — 76937 US GUIDE VASCULAR ACCESS: CPT | Mod: 26 | Performed by: RADIOLOGY

## 2022-01-13 PROCEDURE — 76937 US GUIDE VASCULAR ACCESS: CPT

## 2022-01-13 PROCEDURE — 36558 INSERT TUNNELED CV CATH: CPT

## 2022-01-13 PROCEDURE — 250N000009 HC RX 250: Performed by: PHYSICIAN ASSISTANT

## 2022-01-13 RX ORDER — HEPARIN SODIUM,PORCINE 10 UNIT/ML
5 VIAL (ML) INTRAVENOUS
Status: COMPLETED | OUTPATIENT
Start: 2022-01-13 | End: 2022-01-13

## 2022-01-13 RX ORDER — CEFAZOLIN SODIUM 2 G/100ML
2 INJECTION, SOLUTION INTRAVENOUS EVERY 8 HOURS
Qty: 900 ML | Refills: 0 | OUTPATIENT
Start: 2022-01-13 | End: 2022-01-16

## 2022-01-13 RX ORDER — FLUMAZENIL 0.1 MG/ML
0.2 INJECTION, SOLUTION INTRAVENOUS
Status: DISCONTINUED | OUTPATIENT
Start: 2022-01-13 | End: 2022-01-13 | Stop reason: HOSPADM

## 2022-01-13 RX ORDER — HEPARIN SODIUM,PORCINE 10 UNIT/ML
5-20 VIAL (ML) INTRAVENOUS
Status: DISCONTINUED | OUTPATIENT
Start: 2022-01-13 | End: 2022-01-13 | Stop reason: HOSPADM

## 2022-01-13 RX ORDER — NALOXONE HYDROCHLORIDE 0.4 MG/ML
0.4 INJECTION, SOLUTION INTRAMUSCULAR; INTRAVENOUS; SUBCUTANEOUS
Status: DISCONTINUED | OUTPATIENT
Start: 2022-01-13 | End: 2022-01-13 | Stop reason: HOSPADM

## 2022-01-13 RX ORDER — FENTANYL CITRATE 50 UG/ML
25-50 INJECTION, SOLUTION INTRAMUSCULAR; INTRAVENOUS EVERY 5 MIN PRN
Status: DISCONTINUED | OUTPATIENT
Start: 2022-01-13 | End: 2022-01-13 | Stop reason: HOSPADM

## 2022-01-13 RX ORDER — NALOXONE HYDROCHLORIDE 0.4 MG/ML
0.2 INJECTION, SOLUTION INTRAMUSCULAR; INTRAVENOUS; SUBCUTANEOUS
Status: DISCONTINUED | OUTPATIENT
Start: 2022-01-13 | End: 2022-01-13 | Stop reason: HOSPADM

## 2022-01-13 RX ORDER — HEPARIN SODIUM,PORCINE 10 UNIT/ML
5-20 VIAL (ML) INTRAVENOUS EVERY 24 HOURS
Status: DISCONTINUED | OUTPATIENT
Start: 2022-01-13 | End: 2022-01-13 | Stop reason: HOSPADM

## 2022-01-13 RX ADMIN — ACETAMINOPHEN 500 MG: 325 SOLUTION ORAL at 04:10

## 2022-01-13 RX ADMIN — CEFAZOLIN SODIUM 2 G: 2 INJECTION, SOLUTION INTRAVENOUS at 08:45

## 2022-01-13 RX ADMIN — OXYCODONE HYDROCHLORIDE 5 MG: 5 SOLUTION ORAL at 08:43

## 2022-01-13 RX ADMIN — OXYCODONE HYDROCHLORIDE 5 MG: 5 SOLUTION ORAL at 02:41

## 2022-01-13 RX ADMIN — SUCRALFATE 1 G: 1 SUSPENSION ORAL at 08:43

## 2022-01-13 RX ADMIN — LIDOCAINE HYDROCHLORIDE 10 ML: 10 INJECTION, SOLUTION EPIDURAL; INFILTRATION; INTRACAUDAL; PERINEURAL at 10:15

## 2022-01-13 RX ADMIN — Medication 5 ML: at 14:58

## 2022-01-13 RX ADMIN — ONDANSETRON 8 MG: 4 TABLET, ORALLY DISINTEGRATING ORAL at 02:24

## 2022-01-13 RX ADMIN — ACETAMINOPHEN 500 MG: 325 SOLUTION ORAL at 11:45

## 2022-01-13 RX ADMIN — FENTANYL CITRATE 50 MCG: 50 INJECTION INTRAMUSCULAR; INTRAVENOUS at 10:27

## 2022-01-13 RX ADMIN — CEFAZOLIN SODIUM 2 G: 2 INJECTION, SOLUTION INTRAVENOUS at 14:23

## 2022-01-13 RX ADMIN — FENTANYL CITRATE 50 MCG: 50 INJECTION INTRAMUSCULAR; INTRAVENOUS at 10:15

## 2022-01-13 RX ADMIN — SODIUM CHLORIDE, PRESERVATIVE FREE 2 ML: 5 INJECTION INTRAVENOUS at 10:38

## 2022-01-13 RX ADMIN — OXYCODONE HYDROCHLORIDE 5 MG: 5 SOLUTION ORAL at 08:55

## 2022-01-13 RX ADMIN — FENTANYL CITRATE 50 MCG: 50 INJECTION INTRAMUSCULAR; INTRAVENOUS at 10:05

## 2022-01-13 RX ADMIN — MIDAZOLAM 1 MG: 1 INJECTION INTRAMUSCULAR; INTRAVENOUS at 10:04

## 2022-01-13 RX ADMIN — DEXTROAMPHETAMINE SACCHARATE, AMPHETAMINE ASPARTATE, DEXTROAMPHETAMINE SULFATE AND AMPHETAMINE SULFATE 20 MG: 2.5; 2.5; 2.5; 2.5 TABLET ORAL at 08:43

## 2022-01-13 RX ADMIN — MIDAZOLAM 1 MG: 1 INJECTION INTRAMUSCULAR; INTRAVENOUS at 10:15

## 2022-01-13 RX ADMIN — CARVEDILOL 6.25 MG: 3.12 TABLET, FILM COATED ORAL at 08:43

## 2022-01-13 RX ADMIN — MIDAZOLAM 1 MG: 1 INJECTION INTRAMUSCULAR; INTRAVENOUS at 10:27

## 2022-01-13 RX ADMIN — OXYCODONE HYDROCHLORIDE 10 MG: 5 SOLUTION ORAL at 14:24

## 2022-01-13 RX ADMIN — NICOTINE 1 PATCH: 14 PATCH, EXTENDED RELEASE TRANSDERMAL at 02:26

## 2022-01-13 RX ADMIN — OXYCODONE HYDROCHLORIDE 5 MG: 5 SOLUTION ORAL at 02:25

## 2022-01-13 ASSESSMENT — ACTIVITIES OF DAILY LIVING (ADL)
ADLS_ACUITY_SCORE: 9

## 2022-01-13 NOTE — PROGRESS NOTES
[facility]    Progress Note - [place] 2 Service        Date of Admission:  1/8/2022    Assessment & Plan   Parker Acevedo is a 51 year old male admitted on 1/8/2022. He has a history of recurrent bacteremia/line infections, history of Celestino-en-Y gastric bypass, esophagojejunostomy complicated by short gut syndrome, severe malnutrition on TPN, HTN, ADHD, GERD, asthma. He is admitted for Staphylococcus epidermidis and Staphylococcus hominis bacteremia, concerning for line infection. Right Cm line removed without any complications by IR on 1/10/2022.    Changes Today:    -- Cm catheter removed w/o any complications on 1/10/2022  -- BC positive for GPC on 01/09  -- Per ID, ok with CVC placement for TPN. IR consulted + 7-day course of cefazolin IV (start date 01/10 - 01/17)  -- Continue oxycodone 5-10mg TID PRN   -- If CVC placed tomorrow and parenteral nutrition established, safe to discharge tomorrow     #. CLABSI  #. Staph epidermidis and Staph hominis bacteremia (oxacillin-susceptible) in the setting of recurrent line infections     Recent history of fevers/chills, malaise, headaches, and myalgias since 1/4/22, for which blood cultures were obtained (1/6/22) from Cm and peripheral that both grew S. Epi and S. Hominis (both oxacillin sensitive). Repeat blood cultures from admission (1/8/22) also positive for GPC in clusters. Likely source is Cm catheter. As for other etiologies, he does not have a history of endocarditis/mechanical valve/pacemaker, TTE this admission not suggestive of vegetations, no history of joint replacement. Patient currently being managed on cefazolin with initial improvement in symptoms, Cm line removed for definitive source control on 1/10/2022. BC positive for GPC on 01/09. Pending final results.  -- ID consulted    - PO Vancomycin discontinued    - Continue with IV cefazolin (Hx of penicillin allergies)   - Per ID, ok  with CVC placement for TPN. IR consulted + 7-day course of cefazolin IV (start date 01/10 - 01/17)  --  Continue pta acetaminophen 500mg q4h prn  --  Due to BC (+) on 01/09, 2 BC ordered today  --  F/u Cm/peripheral BC from 01/10    #. Complex GI surgery history (Celestino-en-Y gastric bypass/esophagojejunostomy c/b short gut syndrome and malnutrition)  #. G tube placed  #. On PPN    Cm catheter removed on 1/10 for source control of Staphylococcus epidermidis bacteremia, as above. He normally got TPN as well as fluids (~1-2L LR daily) through the Cm. Refusing PPN due to high amounts of lipids.   -- Continue pta ondansetron 8mg q8h prn  -- Continue pta sucralfate 1g qid prn  -- Continue pta nystatin cream (for G tube site)  -- Patient agreeable with Clinimix PPN, but not any lipid-containing PPN diet. Coordinated with Pharmacist and Nutritionist  -- Hold 1L NS bolus per day PRN since patient     #. Headaches + myalgias amidst underlying bacteremia  #. H/o chronic pain    Patient endorsing persistent presence of poorly controlled headache and myalgias since admission  - Scheduled tylenol 500mg q4h  - Continue pta fentanyl patch q48 hours  - Continue with PTA home oxycodone 5-10mg TID     Chronic/stable  #Chronic normocytic anemia, stable  Baseline Hgb ~10-12.  - continue to monitor    #Anxiety  - continue pta lorazepam 1mg daily prn     #ADHD  - continue pta Adderall 20mg daily     #HTN  - continue pta carvedilol 6.25mg bid      #Asthma  - continue pta albuterol prn     #GERD  - continue pta pantoprazole 40mg daily     Diet: Snacks/Supplements Adult: Other; Ensure Enlive (amie/strawberry) @ 10 and 2.  Please also send Powerade/Gatorade (8 oz) at each of these snack times as well.; Between Meals  parenteral nutrition - Clinimix E  NPO for Medical/Clinical Reasons Except for: NPO but receiving PN    DVT Prophylaxis: Pneumatic Compression Devices  Sanchez Catheter: Not present  Fluids: no continuous  fluids  Central Lines: None  Code Status: Full Code      Disposition Plan   Expected Discharge: >2 days, pending ID consult, removal of Cm, and coordination of abx plan  Anticipated discharge location:  Awaiting care coordination huddle     The patient's care was discussed with the Dr. Castro.    Daniela Burrows MD  Internal Medicine PGY-1    [address]  Securely message with the Vocera Web Console (learn more here)  Text page via [employer] Paging/Directory    Please see sign in/sign out for up to date coverage information    Clinically Significant Risk Factors Present on Admission                    ______________________________________________________________________    Interval History   No acute events overnight. Reports that headaches have improved. Still has some nausea but has gone down some. No fevers, chills, shortness of breath, chest pain, abdominal pain, vomiting. He is eager to have his Cm catheter removed.     Data reviewed today: I reviewed all medications, new labs and imaging results over the last 24 hours.    Physical Exam   Vital Signs: Temp: 98.2  F (36.8  C) Temp src: Oral BP: 116/75 Pulse: 79   Resp: 18 SpO2: 97 % O2 Device: None (Room air)    Weight: 193 lbs 9.6 oz  General: sitting up in bed, NAD  HEENT: no scleral icterus or conjunctival injection, oropharynx clear  Resp: clear to auscultation bilaterally, no crackles or wheezes  Cardiac: regular rate and rhythm, no murmurs  Chest: right Cm catheter without surrounding purulence, tenderness, erythema  Abdomen: soft, non-tender, non-distended. Left G tube without purulence.   Extremities: warm, no lower extremity edema  MSK: normal muscle bulk.  Skin: no lesions on exposed skin  Neuro: alert, oriented x4. No focal neuro deficits  Psych: appropriate mood and affect, answers questions appropriately    Data   Recent Labs   Lab 01/12/22  1718 01/12/22  1312  01/12/22  0718 01/11/22  0956 01/10/22  1812 01/10/22  1631 01/10/22  1223 01/10/22  0745 01/09/22  1307 01/09/22  0558 01/08/22  1828 01/08/22  1430   WBC  --   --   --   --   --  7.6  --  8.0  --  12.5*   < > 4.6   HGB  --   --   --   --   --  10.9*  --  9.7*  --  9.8*   < > 11.3*   MCV  --   --   --   --   --  98  --  99  --  97   < > 97   PLT  --   --   --   --   --  173  --  145*  --  130*   < > 145*   INR  --   --   --   --   --   --   --  1.15  --  1.27*  --  1.07   NA  --   --  142 141  --  141  --  143  --  140   < > 141   POTASSIUM  --   --  3.4 3.5  --  3.8  --  4.0  --  3.9   < > 3.9   CHLORIDE  --   --  110* 109  --  111*  --  110*  --  110*   < > 108   CO2  --   --  28 25  --  28  --  28  --  26   < > 29   BUN  --   --  15 14  --  12  --  18  --  14   < > 10   CR  --   --  0.65* 0.68  --  0.69  --  0.72  --  0.66   < > 0.69   ANIONGAP  --   --  4 7  --  2*  --  5  --  4   < > 4   SILAS  --   --  8.4* 8.6  --  8.4*  --  8.2*  --  8.8   < > 8.6   GLC 94 94 86 100*   < > 102*   < > 98   < > 114*   < > 96   ALBUMIN  --   --   --   --   --   --   --  2.6*  --  2.5*   < > 3.2*   PROTTOTAL  --   --   --   --   --   --   --  6.2*  --  6.0*   < > 7.0   BILITOTAL  --   --   --   --   --   --   --  0.2  --  0.5   < > 0.5   ALKPHOS  --   --   --   --   --   --   --  58  --  60   < > 76   ALT  --   --   --   --   --   --   --  22  --  28   < > 37   AST  --   --   --   --   --   --   --  11  --  11   < > 21    < > = values in this interval not displayed.

## 2022-01-13 NOTE — IR NOTE
Patient Name: Parker Acevedo  Medical Record Number: [id number]  Today's Date: 1/13/2022    Procedure: tunneled CVC placement  Proceduralist: Dr. Henry    Procedure Start: 1010  Procedure end: 1030  Sedation medications administered: versed 3 mg., fentanyl 150 mcg.    Report given to: Roland GAVIRIA RN    Other Notes: Pt arrived to IR room 2  from  Consent reviewed. Pt denies any questions or concerns regarding procedure. Pt positioned supine  and monitored per protocol. Pt tolerated procedure without any noted complications. Pt transferred back to .

## 2022-01-13 NOTE — PLAN OF CARE
"/75 (BP Location: Right arm)   Pulse 79   Temp 98.2  F (36.8  C) (Oral)   Resp 18   Ht 1.803 m (5' 11\")   Wt 87.8 kg (193 lb 9.6 oz)   SpO2 97%   BMI 27.00 kg/m       Shift: 5446-3138  Reason: bacteremia- line associated  Status: VSS  Pain/Nausea: C/O headache and abd pain- PRN oxycodone given. Continuous nausea- oral Zofran x1. Ativan before bed  Mobility: Up ad vitor  Diet: NPO. TPN stopped by pt, team aware  Labs: Reviewed, BS 94 and 84  LDAs: L PIV-SL, L PIV-SL  Skin/incisions: Intact ex G tube, PIVs, scrapes on bilateral forearms  Neuro: A&Ox4, denies N/T. Calls appropriately  Respiratory: LS clear-diminished, RA sating mid-high 90s. Denies SOB  Cardiac: WDL, denies chest pain  GI/: Voiding spontaneously without difficulty. LBM 1/9 per pt report, +BS  New Changes: No acute changes this shift  Plan: Continue with POC, NPO for procedure to place [place] tomorrow morning  "

## 2022-01-13 NOTE — PROGRESS NOTES
Shift: 1900-0730  VSS: /71 p 67 T 98.5 02 sat 98%  Alert and orient: x3 neuros intact  Pain/Nausea: C/O abd pain- and nausea  PRN oxycodone 10 mg and 8 mg of Zofran was given  Mobility: independ with ambulate  Diet: NPO.  Labs:   LDAs: L PIV-SL, L PIV-SL  Skin/incisions: Intact ex G tube, PIVs, scrapes on bilateral forearms  Respiratory: LS clear-diminished, RA sating  98%. Denies SOB   Cardiac: /71 P 67  GI/: Pt denies  Voiding this night shift . LBM 1/9 per pt report, +BS

## 2022-01-13 NOTE — PROGRESS NOTES
"[id number] progress note. Care from: 07:00 - 15:00     /75   Pulse 58   Temp 97.8  F (36.6  C) (Oral)   Resp 17   Ht 1.803 m (5' 11\")   Wt 87.8 kg (193 lb 9.6 oz)   SpO2 99%   BMI 27.00 kg/m         Vitals: VSS     Neuro: WDL   o Pain: Pain currently well controlled on PO regimen   o Mood/Behavior: Calm, cooperative     Activity: Up ad vitor     Cardiovascular: WDL     Respiratory: WDL     GI & Nutrition: Pt taking Rx orally. New CVC placed today for ongoing TPN and IV abx   o Nausea: Denies  o Bowel Movement: WDL     : WDL     Skin, Wounds & LDAs: No deficits. New CVC dressing intact    Events & Plan Updates: New CVC placed today for expected TPN and antibiotics at home. Per team, unable to discharge today due to continued positive blood cultures. Goal is negative blood cultures for 48 hours before discharge. Pt stating he will leave AMA if not deemed medically ready for discharge.   "

## 2022-01-13 NOTE — CONSULTS
"    Interventional Radiology Consult Service Note  01/13/22     Patient is on IR schedule 01/13/22 for a TCVC-double lumen PINZON.   Labs WNL for procedure.   Orders for NPO, scrubs and antibiotics (already on IV ancef every 8 hours and IV Vancomycin) have been entered.   Medications to be held include: None   Consent will be done prior to procedure.     [place] the [place] [phone] for estimated time of procedure.     Case discussed with IR attending Dr. John Henry.     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     IR removed line on 1/10/2021 now ID has cleared for replacement today.  They also recommend re-siting CVC to allow right chest to heal as there were signs of mild cellulitis at the right chest insertion site on his initial presentation.    Expected date of discharge: TBD    Vitals:   /71 (BP Location: Right arm)   Pulse 67   Temp 98.5  F (36.9  C) (Oral)   Resp 18   Ht 1.803 m (5' 11\")   Wt 87.8 kg (193 lb 9.6 oz)   SpO2 98%   BMI 27.00 kg/m      Pertinent Labs:     Lab Results   Component Value Date    WBC 7.6 01/10/2022    WBC 8.0 01/10/2022    WBC 12.5 (H) 01/09/2022    WBC 6.9 06/29/2021    WBC 8.1 06/14/2021    WBC 7.5 06/09/2021     Lab Results   Component Value Date    HGB 10.9 01/10/2022    HGB 9.7 01/10/2022    HGB 9.8 01/09/2022    HGB 12.1 06/29/2021    HGB 12.0 06/14/2021    HGB 13.3 06/09/2021     Lab Results   Component Value Date     01/10/2022     01/10/2022     01/09/2022     06/29/2021     06/14/2021     06/09/2021     Lab Results   Component Value Date    INR 1.15 01/10/2022    INR 1.07 05/07/2021    PTT 26 07/22/2019     Lab Results   Component Value " Date    POTASSIUM 3.4 01/12/2022    POTASSIUM 3.5 06/29/2021        No results found for: HCGQUANT    COVID-19 Antibody Results, Testing for Immunity    COVID-19 Antibody Results, Testing for Immunity   No data to display.         COVID-19 PCR Results    COVID-19 PCR Results 7/28/20 8/31/20 11/9/20 1/30/21 2/20/21 2/20/21 3/17/21 3/17/21 5/5/21 5/5/21 7/12/21 1/8/22        1058 1058 1144 1144 0944 0944     COVID-19 Virus PCR to [facility] - Result Not Detected Not Detected Detected, Abnormal Result (A)  Test received-See reflex to IDDL test SARS CoV2 (COVID-19) Virus RT-PCR  Test received-See reflex to IDDL test SARS CoV2 (COVID-19) Virus RT-PCR  Test received-See reflex to IDDL test SARS CoV2 (COVID-19) Virus RT-PCR      COVID-19 Virus PCR to [facility] - Source Nasopharyngeal Nasopharyngeal Nasopharyngeal  Nasopharyngeal  Nasopharyngeal  Nasopharyngeal      SARS-CoV-2 Virus Specimen Source      Nasopharyngeal  Nasopharyngeal  Nasopharyngeal     Flu A/B & SARS-COV-2 PCR Source    Nasopharyngeal           SARS-CoV-2 PCR Result    NEGATIVE  NEGATIVE  NEGATIVE  NEGATIVE     SARS CoV2 PCR           Negative Negative   (A) Abnormal value       Comments are available for some flowsheets but are not being displayed.             Karla Jeter PA-C  Interventional Radiology  Pager: [phone]

## 2022-01-13 NOTE — PRE-PROCEDURE
GENERAL PRE-PROCEDURE:   Procedure:  Tunneled double lumen central venous catheter placement  Date/Time:  1/13/2022 9:52 AM    Written consent obtained?: Yes    Risks and benefits: Risks, benefits and alternatives were discussed    Consent given by:  Patient  Patient states understanding of procedure being performed: Yes    Patient's understanding of procedure matches consent: Yes    Procedure consent matches procedure scheduled: Yes    Expected level of sedation:  Moderate  Appropriately NPO:  Yes  ASA Class:  3  Mallampati  :  Grade 1- soft palate, uvula, tonsillar pillars, and posterior pharyngeal wall visible  Lungs:  Lungs clear with good breath sounds bilaterally  Heart:  Normal heart sounds and rate  History & Physical reviewed:  History and physical reviewed and no updates needed  Statement of review:  I have reviewed the lab findings, diagnostic data, medications, and the plan for sedation

## 2022-01-13 NOTE — PROGRESS NOTES
"BP (!) 146/96 (BP Location: Right arm)   Pulse 69   Temp 98.8  F (37.1  C) (Oral)   Resp 18   Ht 1.803 m (5' 11\")   Wt 87.8 kg (193 lb 9.6 oz)   SpO2 99%   BMI 27.00 kg/m      Ultimately, patient status deemed adequate for discharge by ID and primary team. Discharge details:     Discharge Instructions: Reviewed instructions and AVS reviewed with patient and family/caregivers. No further questions at this time    Patient Belongings: Patient acknowledges receipt of all personal belongings at time of discharge.     Prescriptions: None new. \[facility]\"" will be delivering doses of Ancef this evening     Disposition: Pt will return to home living situation     Transportation: Family to provide ride     Patient left 5B at 15:15  "

## 2022-01-13 NOTE — PROGRESS NOTES
Care Management Discharge Note    Discharge Date: 01/14/2022       Discharge Disposition: Home Care,Home Infusion,Home    Discharge Services: Resumption of home services    Discharge DME: None    Discharge Transportation: family or friend will provide    Private pay costs discussed: Not applicable    PAS Confirmation Code: NA   Patient/family educated on Medicare website which has current facility and service quality ratings: no    Education Provided on the Discharge Plan: yes   Persons Notified of Discharge Plans: patient, [facility]  Patient/Family in Agreement with the Plan:  yes    Handoff Referral Completed: Yes    Additional Information:  Patient is medically ready for discharge to home today with resumption of TPN and IV Abx to complete a 7 day course.  [facility] [place] [facility] Shaylee crockett, updated.  They will plan for delivery of supplies to his home.  Patient has done IV abx multiple times in the past and is comfortable with administration.  MD team updated to complete discharge orders.  Once completed it will take approximately 4 hours to get medications/supplies to be delivered.  RNCC will remain available if further needs arise.          [facility](TPN, IV abx)  Phone: [phone]  Fax: [phone]    ADDENDUM 1345: Received a call from provider that patients blood cultures are positive and will not discharge today.  Updated Shaylee Lawson.        ADDENDUM 1500: Received update that patient is ok for discharge.  Got ancef dose at 3pm.  Updated [facility] Shaylee crockett.  [facility] to deliver to patients home this evening.        Yolanda Mallory RNCC  Phone: [phone]  Pager: [phone]  To contact the weekend RNCC, Page: [phone]

## 2022-01-13 NOTE — PROCEDURES
[facility]    Procedure: IR Procedure Note    Date/Time: 1/13/2022 10:40 AM  Performed by: John Henry MD  Authorized by: John Henry MD       UNIVERSAL PROTOCOL   Site Marked: NA  Prior Images Obtained and Reviewed:  Yes  Required items: Required blood products, implants, devices and special equipment available    Patient identity confirmed:  Verbally with patient, arm band, provided demographic data and hospital-assigned identification number  Patient was reevaluated immediately before administering moderate or deep sedation or anesthesia  Confirmation Checklist:  Patient's identity using two indicators, relevant allergies, procedure was appropriate and matched the consent or emergent situation and correct equipment/implants were available  Time out: Immediately prior to the procedure a time out was called    Universal Protocol: the Joint Commission Universal Protocol was followed    Preparation: Patient was prepped and draped in usual sterile fashion    ESBL (mL):  2     ANESTHESIA    Anesthesia: Local infiltration  Local Anesthetic:  Lidocaine 1% without epinephrine  Anesthetic Total (mL):  10      SEDATION  Patient Sedated: Yes    Sedation Type:  Moderate (conscious) sedation  Sedation:  Fentanyl and midazolam  Vital signs: Vital signs monitored during sedation    See dictated procedure note for full details.  Findings: 25 cm 9.5 Frisian BD Bard PowerHickman placed via right internal jugular vein. Tip at the atriocaval junction. Heparin locked and ready for immediate use.    Specimens: none    Complications: None    Condition: Stable      PROCEDURE    Patient Tolerance:  Patient tolerated the procedure well with no immediate complications  Length of time physician/provider present for 1:1 monitoring during sedation: 20

## 2022-01-14 ENCOUNTER — HOME INFUSION (PRE-WILLOW HOME INFUSION) (OUTPATIENT)
Dept: PHARMACY | Facility: CLINIC | Age: 52
End: 2022-01-14
Payer: COMMERCIAL

## 2022-01-14 ENCOUNTER — PATIENT OUTREACH (OUTPATIENT)
Dept: CARE COORDINATION | Facility: CLINIC | Age: 52
End: 2022-01-14
Payer: COMMERCIAL

## 2022-01-14 NOTE — PROGRESS NOTES
Clinic Care Coordination Contact  [facility]/Voicemail       Clinical Data: Care Coordinator Outreach  Outreach attempted x 1.  Left message on patient's voicemail with call back information and requested return call.  Plan:  Care Coordinator will try to reach patient again in 1-2 business days.      Dianelys MAYN, RN, PHN, [facility]  Primary Clinic Care Coordination    [facility]  Primary Care Clinics  [email or web address]   Office: [phone]  Employed by [facility] [employer] [employer]

## 2022-01-15 LAB
BACTERIA BLD CULT: ABNORMAL
BACTERIA BLD CULT: ABNORMAL
BACTERIA BLD CULT: NO GROWTH

## 2022-01-16 LAB
BACTERIA BLD CULT: ABNORMAL
BACTERIA BLD CULT: NO GROWTH
BACTERIA BLD CULT: NO GROWTH

## 2022-01-17 LAB
BACTERIA BLD CULT: ABNORMAL

## 2022-01-17 NOTE — PROGRESS NOTES
Clinic Care Coordination Contact  [facility] [[email or web address]]/Voicemail       Clinical Data: Care Coordinator Outreach  Outreach attempted x 2.  Left message on patient's voicemail with call back information and requested return call.  Plan:  Care Coordinator will try to reach patient again in 3-5 business days.      \Dianelys MAYN, RN, PHN, [facility]  Primary Clinic Care Coordination    [facility]  Primary Care Clinics  [email or web address]   Office: [phone]  Employed by [facility]

## 2022-01-18 ENCOUNTER — HOME INFUSION (PRE-WILLOW HOME INFUSION) (OUTPATIENT)
Dept: PHARMACY | Facility: CLINIC | Age: 52
End: 2022-01-18

## 2022-01-18 DIAGNOSIS — R13.10 DYSPHAGIA, UNSPECIFIED: ICD-10-CM

## 2022-01-18 DIAGNOSIS — E46 MALNUTRITION (H): ICD-10-CM

## 2022-01-18 DIAGNOSIS — R11.11 VOMITING WITHOUT NAUSEA: ICD-10-CM

## 2022-01-18 DIAGNOSIS — Z98.84 S/P BARIATRIC SURGERY: ICD-10-CM

## 2022-01-18 DIAGNOSIS — Z20.822 ENCOUNTER FOR LABORATORY TESTING FOR COVID-19 VIRUS: ICD-10-CM

## 2022-01-18 DIAGNOSIS — Z79.891 ENCOUNTER FOR LONG-TERM OPIATE ANALGESIC USE: ICD-10-CM

## 2022-01-18 DIAGNOSIS — B95.7 OTHER STAPHYLOCOCCUS AS THE CAUSE OF DISEASES CLASSIFIED ELSEWHERE: ICD-10-CM

## 2022-01-18 DIAGNOSIS — Z11.59 ENCOUNTER FOR SCREENING FOR OTHER VIRAL DISEASES: Primary | ICD-10-CM

## 2022-01-18 DIAGNOSIS — T80.211D BLOODSTREAM INFECTION DUE TO CENTRAL VENOUS CATHETER, SUBSEQUENT ENCOUNTER: ICD-10-CM

## 2022-01-18 DIAGNOSIS — Z11.59 ENCOUNTER FOR SCREENING FOR OTHER VIRAL DISEASES: ICD-10-CM

## 2022-01-18 LAB
ALBUMIN SERPL-MCNC: 2.9 G/DL (ref 3.4–5)
ALP SERPL-CCNC: 59 U/L (ref 40–150)
ALT SERPL W P-5'-P-CCNC: 14 U/L (ref 0–70)
ANION GAP SERPL CALCULATED.3IONS-SCNC: 4 MMOL/L (ref 3–14)
AST SERPL W P-5'-P-CCNC: 10 U/L (ref 0–45)
BACTERIA BLD CULT: NO GROWTH
BACTERIA BLD CULT: NO GROWTH
BASOPHILS # BLD AUTO: 0 10E3/UL (ref 0–0.2)
BASOPHILS NFR BLD AUTO: 0 %
BILIRUB DIRECT SERPL-MCNC: 0.1 MG/DL (ref 0–0.2)
BILIRUB SERPL-MCNC: 0.3 MG/DL (ref 0.2–1.3)
BILIRUB SERPL-MCNC: 0.3 MG/DL (ref 0.2–1.3)
BUN SERPL-MCNC: 18 MG/DL (ref 7–30)
CALCIUM SERPL-MCNC: 8.2 MG/DL (ref 8.5–10.1)
CHLORIDE BLD-SCNC: 113 MMOL/L (ref 94–109)
CO2 SERPL-SCNC: 27 MMOL/L (ref 20–32)
CREAT SERPL-MCNC: 0.62 MG/DL (ref 0.66–1.25)
EOSINOPHIL # BLD AUTO: 0.2 10E3/UL (ref 0–0.7)
EOSINOPHIL NFR BLD AUTO: 2 %
ERYTHROCYTE [DISTWIDTH] IN BLOOD BY AUTOMATED COUNT: 15.1 % (ref 10–15)
FASTING STATUS PATIENT QL REPORTED: NORMAL
GFR SERPL CREATININE-BSD FRML MDRD: >90 ML/MIN/1.73M2
GLUCOSE BLD-MCNC: 76 MG/DL (ref 70–99)
HCT VFR BLD AUTO: 32.9 % (ref 40–53)
HGB BLD-MCNC: 10.5 G/DL (ref 13.3–17.7)
HOLD SPECIMEN: NORMAL
HOLD SPECIMEN: NORMAL
IMM GRANULOCYTES # BLD: 0.1 10E3/UL
IMM GRANULOCYTES NFR BLD: 1 %
LYMPHOCYTES # BLD AUTO: 1.7 10E3/UL (ref 0.8–5.3)
LYMPHOCYTES NFR BLD AUTO: 18 %
MAGNESIUM SERPL-MCNC: 1.9 MG/DL (ref 1.6–2.3)
MCH RBC QN AUTO: 30.3 PG (ref 26.5–33)
MCHC RBC AUTO-ENTMCNC: 31.9 G/DL (ref 31.5–36.5)
MCV RBC AUTO: 95 FL (ref 78–100)
MONOCYTES # BLD AUTO: 1.1 10E3/UL (ref 0–1.3)
MONOCYTES NFR BLD AUTO: 12 %
NEUTROPHILS # BLD AUTO: 6.3 10E3/UL (ref 1.6–8.3)
NEUTROPHILS NFR BLD AUTO: 67 %
NRBC # BLD AUTO: 0 10E3/UL
NRBC BLD AUTO-RTO: 0 /100
PHOSPHATE SERPL-MCNC: 2.8 MG/DL (ref 2.5–4.5)
PLATELET # BLD AUTO: 231 10E3/UL (ref 150–450)
POTASSIUM BLD-SCNC: 3.5 MMOL/L (ref 3.4–5.3)
PROT SERPL-MCNC: 6.4 G/DL (ref 6.8–8.8)
RBC # BLD AUTO: 3.47 10E6/UL (ref 4.4–5.9)
SODIUM SERPL-SCNC: 144 MMOL/L (ref 133–144)
TRIGL SERPL-MCNC: 52 MG/DL
WBC # BLD AUTO: 9.4 10E3/UL (ref 4–11)

## 2022-01-18 PROCEDURE — 82248 BILIRUBIN DIRECT: CPT

## 2022-01-18 PROCEDURE — 80053 COMPREHEN METABOLIC PANEL: CPT

## 2022-01-18 PROCEDURE — 84478 ASSAY OF TRIGLYCERIDES: CPT

## 2022-01-18 PROCEDURE — 85025 COMPLETE CBC W/AUTO DIFF WBC: CPT

## 2022-01-18 PROCEDURE — 83735 ASSAY OF MAGNESIUM: CPT

## 2022-01-18 PROCEDURE — 36592 COLLECT BLOOD FROM PICC: CPT

## 2022-01-19 ENCOUNTER — HOME INFUSION (PRE-WILLOW HOME INFUSION) (OUTPATIENT)
Dept: PHARMACY | Facility: CLINIC | Age: 52
End: 2022-01-19

## 2022-01-21 ENCOUNTER — TELEPHONE (OUTPATIENT)
Dept: INTERNAL MEDICINE | Facility: CLINIC | Age: 52
End: 2022-01-21
Payer: COMMERCIAL

## 2022-01-21 NOTE — PROGRESS NOTES
This is a recent snapshot of the patient's [facility] medical record.  For current drug dose and complete information and questions, call [phone] or In Basket pool, [facility]v home infusion ([id number])  CSN Number:  [id number]

## 2022-01-21 NOTE — PROGRESS NOTES
This is a recent snapshot of the patient's [facility] medical record.  For current drug dose and complete information and questions, call [phone] or In Basket pool, fv home infusion ([id number])  CSN Number:  [id number]

## 2022-01-21 NOTE — PROGRESS NOTES
This is a recent snapshot of the patient's [facility] medical record.  For current drug dose and complete information and questions, call [phone] or In Basket pool, fv home infusion ([id number])  CSN Number:  [id number]

## 2022-01-21 NOTE — TELEPHONE ENCOUNTER
Reason for Call:  Form, our goal is to have forms completed with 72 hours, however, some forms may require a visit or additional information.    Type of letter, form or note:  Home Health Certification    Who is the form from?: Home care    Where did the form come from: form was faxed in    What clinic location was the form placed at?: [facility]    Where the form was placed: Given to MA/CHRISTY Rivera    What number is listed as a contact on the form?: [phone]       Additional comments: [facility]    Call taken on 1/21/2022 at 3:41 PM by Kristin Jaimes

## 2022-01-21 NOTE — PROGRESS NOTES
This is a recent snapshot of the patient's [facility] medical record.  For current drug dose and complete information and questions, call [phone] or In Basket pool, fv home infusion ([id number])  CSN Number:  [id number]

## 2022-01-25 ENCOUNTER — HOME INFUSION (PRE-WILLOW HOME INFUSION) (OUTPATIENT)
Dept: PHARMACY | Facility: CLINIC | Age: 52
End: 2022-01-25
Payer: COMMERCIAL

## 2022-01-25 DIAGNOSIS — G89.4 CHRONIC PAIN SYNDROME: ICD-10-CM

## 2022-01-25 DIAGNOSIS — G89.29 CHRONIC ABDOMINAL PAIN: ICD-10-CM

## 2022-01-25 DIAGNOSIS — R10.9 CHRONIC ABDOMINAL PAIN: ICD-10-CM

## 2022-01-25 RX ORDER — OXYCODONE HCL 5 MG/5 ML
5-10 SOLUTION, ORAL ORAL 3 TIMES DAILY PRN
Qty: 900 ML | Refills: 0 | Status: SHIPPED | OUTPATIENT
Start: 2022-01-25 | End: 2022-02-21

## 2022-01-25 RX ORDER — FENTANYL 25 UG/1
1 PATCH TRANSDERMAL
Qty: 15 PATCH | Refills: 0 | Status: SHIPPED | OUTPATIENT
Start: 2022-01-25 | End: 2022-02-21

## 2022-01-25 NOTE — TELEPHONE ENCOUNTER
Script Eprescribed to pharmacy  [place] Prescription Monitoring Program checked    Will send this to MA team to notify patient.    Signed Prescriptions:                        Disp   Refills    oxyCODONE (ROXICODONE) 5 MG/5ML solution   900 mL 0        Sig: Take 5-10 mLs (5-10 mg) by mouth 3 times daily as           needed for pain Max of 30mg/day. Put at least 4           hours between doses. Fill 01/30/22 and start           02/01/22. 30 day supply.  Authorizing Provider: BRANDYN JENKINS    fentaNYL (DURAGESIC) 25 mcg/hr 72 hr patch 15 pat*0        Sig: Place 1 patch onto the skin every 48 hours remove old           patch. Fill 01/30/22 and start 02/01/22.  Authorizing Provider: BRANDYN JENKINS MD  [facility]

## 2022-01-25 NOTE — TELEPHONE ENCOUNTER
Received call from patient requesting refill(s) of     oxyCODONE (ROXICODONE) 5 MG/5ML solution     fentaNYL (DURAGESIC) 25 mcg/hr 72 hr patch    Last dispensed from pharmacy on 12/31/21    Patient's last office/virtual visit by prescribing provider on 01/05/22  Next office/virtual appointment scheduled for 02/09/20    Last urine drug screen date 01/05/22  Current opioid agreement on file (completed within the last year) Yes Date of opioid agreement: 01/05/22    E-prescribe to   [facility] [place], [place] - [address] [place] [address]  Phone: [phone] Fax: [phone]    Will route to nursing [place] for review and preparation of prescription(s).       Deidra Banks MA  [facility]

## 2022-01-25 NOTE — TELEPHONE ENCOUNTER
[employer] message sent with Rx approval from provider.  Stephon  Pain Clinic Management Team

## 2022-01-25 NOTE — TELEPHONE ENCOUNTER
Medication refill information reviewed.     Due date for    oxyCODONE (ROXICODONE) 5 MG/5ML solution      fentaNYL (DURAGESIC) 25 mcg/hr 72 hr patch is 02/01/22     Prescriptions prepped for review.     Will route to provider.

## 2022-01-27 ENCOUNTER — PATIENT OUTREACH (OUTPATIENT)
Dept: CARE COORDINATION | Facility: CLINIC | Age: 52
End: 2022-01-27
Payer: COMMERCIAL

## 2022-01-27 NOTE — PROGRESS NOTES
Clinic Care Coordination Contact  [facility]/Voicemail       Clinical Data: Care Coordinator Outreach  Outreach attempted x 3.  Left message on patient's voicemail with call back information and requested return call.  Plan:  Care Coordinator will do no further outreaches at this time.      Dianelys MAYN, RN, PHN, CCM  Primary Clinic Care Coordination    [facility]  Primary Care Clinics  [email or web address]   Office: [phone]  Employed by [facility]

## 2022-01-29 NOTE — DISCHARGE SUMMARY
[facility]  Discharge Summary - Medicine & Pediatrics       Date of Admission:  1/8/2022  Date of Discharge:  1/13/2022  3:32 PM  Discharging Provider: Daniela Burrows   Discharge Service: Medicine ServiceLINA TEAM 2    Discharge Diagnoses   1. Bacteremia    Follow-ups Needed After Discharge   Follow-up Appointments     Follow Up and recommended labs and tests      Follow up with primary care provider, Chuy Isaac, within 7 days to   evaluate treatment change.  No follow up labs or test are needed.         {Additional important follow-up instructions/to-do's for PCP Follow up blood cultures    Unresulted Labs Ordered in the Past 30 Days of this Admission     No orders found from 12/9/2021 to 1/9/2022.      These results will be followed up by PCP    Discharge Disposition   Discharged to home  Condition at discharge: Good      Hospital Course   Parker Acevedo is a 51 year old male admitted on 1/8/2022. He has a history of recurrent bacteremia/line infections, history of Celestino-en-Y gastric bypass, esophagojejunostomy complicated by short gut syndrome, severe malnutrition on TPN, HTN, ADHD, GERD, asthma. He is admitted for Staphylococcus epidermidis and Staphylococcus hominis bacteremia, concerning for line infection. Right Cm line removed without any complications by IR on 1/10/2022.  The following problems were addressed during his hospitalization:     #. CLABSI  #. Staph epidermidis and Staph hominis bacteremia (oxacillin-susceptible) in the setting of recurrent line infections      Recent history of fevers/chills, malaise, headaches, and myalgias since 1/4/22, for which blood cultures were obtained (1/6/22) from Cm and peripheral that both grew S. Epi and S. Hominis (both oxacillin sensitive). Repeat blood cultures from admission (1/8/22) also positive for GPC in clusters. Likely source is Cm catheter. As for other etiologies, he does not  have a history of endocarditis/mechanical valve/pacemaker, TTE this admission not suggestive of vegetations, no history of joint replacement. Patient currently being managed on cefazolin with initial improvement in symptoms, Cm line removed for definitive source control on 1/10/2022. BC positive for GPC on 01/09. Patient endorsing persistent presence of poorly controlled headache and myalgias since admission  -- ID consulted               - PO Vancomycin discontinued               - Continue with IV cefazolin (Hx of penicillin allergies)              - Per ID, ok with CVC placement for TPN. IR consulted + 7-day course of cefazolin IV (start date 01/10 - 01/17)  --  Pain management   > Continue pta acetaminophen 500mg q4h prn   > Continue pta fentanyl patch q48 hours   > Continue with PTA home oxycodone 5-10mg TID   --  Due to BC (+) on 01/09,   --  F/u Cm/peripheral BC from 01/10     #. Complex GI surgery history (Celestino-en-Y gastric bypass/esophagojejunostomy c/b short gut syndrome and malnutrition)  #. G tube placed  #. On PPN     Cm catheter removed on 1/10 for source control of Staphylococcus epidermidis bacteremia, as above. He normally got TPN as well as fluids (~1-2L LR daily) through the Cm. Refusing PPN due to high amounts of lipids. Patient agreeable with Clinimix PPN, but not any lipid-containing PPN diet. Coordinated with Pharmacist and Nutritionist  -- Continue pta ondansetron   -- Continue pta sucralfate   -- Continue pta nystatin cream (for G tube site)       Consultations This Hospital Stay   PHARMACY TO DOSE VANCO  PHARMACY/NUTRITION TO START AND MANAGE TPN  NUTRITION SERVICES ADULT IP CONSULT  INFECTIOUS DISEASE GENERAL ADULT IP CONSULT  VASCULAR ACCESS CARE ADULT IP CONSULT  CARE MANAGEMENT / SOCIAL WORK IP CONSULT  PHARMACY TO DOSE VANCO  INTERVENTIONAL RADIOLOGY ADULT/PEDS IP CONSULT  VASCULAR ACCESS CARE ADULT IP CONSULT  VASCULAR ACCESS CARE ADULT IP CONSULT  VASCULAR ACCESS  CARE ADULT IP CONSULT  VASCULAR ACCESS CARE ADULT IP CONSULT  INTERVENTIONAL RADIOLOGY ADULT/PEDS IP CONSULT  INTERVENTIONAL RADIOLOGY ADULT/PEDS IP CONSULT    Code Status   Prior       The patient was discussed with [facility]  [facility] [address] [address]  Phone: [phone]  ______________________________________________________________________    Physical Exam   Vital Signs:                   Weight: 193 lbs 9.6 oz  Constitutional: awake, alert, cooperative, no apparent distress, and appears stated age  Respiratory: No increased work of breathing, good air exchange, clear to auscultation bilaterally, no crackles or wheezing  Cardiovascular: Normal apical impulse, regular rate and rhythm, normal S1 and S2, no S3 or S4, and no murmur noted  GI: No scars, normal bowel sounds, soft, non-distended, non-tender, no masses palpated, no hepatosplenomegally  Musculoskeletal: There is no redness, warmth, or swelling of the joints.  Full range of motion noted.  Motor strength is 5 out of 5 all extremities bilaterally.  Tone is normal.  Neurologic: Awake, alert, oriented to name, place and time.  Cranial nerves II-XII are grossly intact.  Motor is 5 out of 5 bilaterally.  Cerebellar finger to nose, heel to shin intact.  Sensory is intact.  Babinski down going, Romberg negative, and gait is normal.      Primary Care Physician   Chuy Isaac    Discharge Orders      Home care nursing referral      Home infusion referral      Care Coordination Referral      Reason for your hospital stay    You were hospitalized for an bloodstream infection, the source was likely 2/2 Port line     Activity    Your activity upon discharge: activity as tolerated     Follow Up and recommended labs and tests    Follow up with primary care provider, Chuy Isaac, within 7 days to evaluate treatment change.  No follow up labs or test are needed.     Diet     Follow this diet upon discharge: Orders Placed This Encounter      Snacks/Supplements Adult: Other; Ensure Enlive (amie/strawberry) @ 10 and 2.  Please also send Powerade/Gatorade (8 oz) at each of these snack times as well.; Between Meals      Regular Diet Adult       Significant Results and Procedures   Results for orders placed or performed during the hospital encounter of 01/08/22   XR Chest 2 Views    Narrative    EXAM: XR CHEST 2 VW  1/8/2022 1:45 PM     HISTORY:  fever       COMPARISON:  Chest x-ray 3/19/2021.    FINDINGS:   Right internal jugular central catheter tip terminates over the low  SVC.    Trachea is midline. Cardiac silhouette is within normal limits.  Pulmonary vasculature is distinct. Trace bilateral pleural effusions.  Bibasilar hazy opacities    Bony structures appear intact. Degenerative changes of the thoracic  spine.      Impression    IMPRESSION:   1.  Hazy right basilar opacities, atelectasis versus infection.  2.  Trace bilateral pleural effusions.    I have personally reviewed the examination and initial interpretation  and I agree with the findings.    JACOB MATHIS MD         SYSTEM ID:  [id number]   IR CVC Tunnel Placement > 5 Yrs of Age    Narrative    PROCEDURES 1/13/2022:  1. Ultrasound guidance for venous access  2. Placement centrally inserted catheter (age > 5 yrs.) tunneled, no  port, no pump  3. Fluoroscopic guidance placement    Clinical History: Central venous access requested for access and  nutrition. Double-lumen tunneled central venous catheter placement  requested.    Comparisons: Chest x-ray 1/8/2022    Staff Radiologist: DAVON Alvarado MD    I, STEVEN ALVRAADO MD, attest that I was present in the procedure room for  the entire procedure.    Medications: The patient was placed on continuous monitoring.  Intravenous sedation was administered. 3 mg Versed and 150 mcg  Fentanyl IV. Vital signs and sedation monitored by nursing staff under  Interventional Radiologists  supervision. The patient remained stable  throughout the procedure.    ATTENDING FACE-TO-FACE SEDATION TIME: 20 minutes.    Fluoroscopy time: 47.9 seconds.    Dose: 6.28 mg    PROCEDURE: The patient understood the limitations, alternatives, and  risks of the procedure and requested the procedure be performed. Both  written and oral consent were obtained.    Limited jugular ultrasound documented right jugular vein patency.    The right neck and upper chest were prepped and draped in the usual  sterile fashion. 1% lidocaine without epinephrine was used for local  anesthesia.     Under ultrasound guidance, right internal jugular venotomy was made  with a micropuncture needle. Ultrasound image documenting internal  jugular patency and needle venotomy was saved in the patient's record.    Under fluoroscopic guidance, the micropuncture needle was exchanged  over guidewire for the micropuncture sheath. Catheter length was  measured with a 0.018 guidewire. Micropuncture sheath saline locked. A  9.5 Grenadian double-lumen Bard PowerHickman catheter was subcutaneously  tunneled from the right anterior chest to the right internal jugular  venotomy site. Micropuncture sheath exchanged for the peel-away  sheath. The catheter was cut to length and placed through the  peel-away sheath. The catheter tip was placed at the high right atrium  under fluoroscopic guidance. Peel-away sheath removed. Both lumens  aspirated and flushed adequately. Each lumen heparin locked. 2-0 nylon  catheter retaining suture and sterile dressing were applied. Right  internal jugular venotomy site closed with Dermabond. No immediate  complication.      Impression    IMPRESSION:   1. Ultrasound guided right internal jugular venotomy.    2. 25 cm 9.5 Grenadian Bard PowerHickman double-lumen tunneled central  venous catheter placed with its tip in the high right atrium. Both  lumens heparin locked and ready for use.    STEVEN ALVARADO MD         SYSTEM ID:  [id number]    Echo Limited     Value    LVEF  55-60%    [facility]    [id number]  [id number]  [id number]  [id number]^MARJ^LIZZIE^SOWMYA     [facility]  Echocardiography Laboratory  [address] [place] [address]     Name: SARA HASSAN  MRN: [id number]  : 1970  Study Date: 2022 09:30 AM  Age: 51 yrs  Gender: Male  Patient Location: [facility]  Reason For Study: Endocarditis  Ordering Physician: LIZZIE MCMAHANATHI  Performed By: Iglesia Chaney RDCS     BSA: 2.1 m2  Height: 72 in  Weight: 198 lb  HR: 69  BP: 135/118 mmHg  ______________________________________________________________________________  Procedure  Limited Portable Echo Adult.  ______________________________________________________________________________  Interpretation Summary  No significant valvular abnormalities were noted.  No vegetation identified.  ______________________________________________________________________________  Left Ventricle  Left ventricular size is normal. Global and regional left ventricular function  is normal with an EF of 55-60%.     Right Ventricle  The right ventricle is normal size. Global right ventricular function is  normal.     Mitral Valve  The mitral valve is normal.     Aortic Valve  Aortic valve is normal in structure and function.     Tricuspid Valve  The tricuspid valve is normal.     Pulmonic Valve  The valve leaflets are not well visualized. On Doppler interrogation, there is  no significant stenosis or regurgitation.     Vessels  The aorta root is normal. The inferior vena cava was normal in size with  preserved respiratory variability. IVC diameter <2.1 cm collapsing >50% with  sniff suggests a normal RA pressure of 3 mmHg.     Pericardium  No pericardial effusion is present.     ______________________________________________________________________________  Doppler Measurements & Calculations  PA acc time: 0.13 sec      ______________________________________________________________________________  Report approved by: Shawanda COHEN 01/09/2022 11:27 AM           *Note: Due to a large number of results and/or encounters for the requested time period, some results have not been displayed. A complete set of results can be found in Results Review.       Discharge Medications   Discharge Medication List as of 1/13/2022  3:03 PM      START taking these medications    Details   ceFAZolin (ANCEF) intermittent infusion 2 g in 100 mL dextrose PRE-MIX Inject 100 mLs (2 g) into the vein every 8 hours for 3 days, Disp-900 mL, R-0, Injection         CONTINUE these medications which have NOT CHANGED    Details   acetaminophen (TYLENOL) 32 mg/mL liquid Take 15.65 mLs (500 mg) by mouth every 4 hours as needed for fever or mild pain, Disp-455 mL,R-1, E-Prescribe      !! albuterol (PROAIR HFA/PROVENTIL HFA/VENTOLIN HFA) 108 (90 Base) MCG/ACT inhaler Inhale 2 puffs into the lungs every 6 hours as needed, HistoricalPharmacy may dispense brand covered by insurance (Proair, or proventil or ventolin or generic albuterol inhaler)      amphetamine-dextroamphetamine (ADDERALL) 20 MG tablet TAKE ONE TABLET BY MOUTH ONCE DAILY, Disp-30 tablet, R-0, E-Prescribe      carvedilol (COREG) 6.25 MG tablet Take 6.25 mg by mouth 2 times daily (with meals), Historical      cyanocobalamin (CYANOCOBALAMIN) 1000 MCG/ML injection INJECT 1 ML INTO THE MUSCLE EVERY 30 DAYS, Disp-1 mL, R-3, E-Prescribe      diphenhydrAMINE (BENADRYL) 12.5 MG/5ML syrup Take 25 mg by mouth every 4 hours as needed for itching, allergies or sleep, Disp-237 mL, R-0, E-Prescribe      EPINEPHrine (ANY BX GENERIC EQUIV) 0.3 MG/0.3ML injection 2-pack Historical      lactated ringers infusion Inject 1,000-2,000 mLs into the vein daily as needed, No Print Out      lidocaine (LIDODERM) 5 % patch Place 1-2 patches onto the skin every 24 hours Wear for 12 hours, remove for 12 hours.  OK to cut to better  fit to size.Disp-60 patch, [id number]      LORazepam (ATIVAN) 1 MG tablet TAKE 1 TABLET (1MG) BY MOUTH AS NEEDED FOR ANXIETY WITH TPN AND MEDICATIONS . JUST ONCE A DAY (30 TO LAST 30 DAYS), Disp-30 tablet, R-0, E-Prescribe      naloxone (NARCAN) 4 MG/0.1ML nasal spray Spray 1 spray (4 mg) into one nostril alternating nostrils once as needed for opioid reversal every 2-3 minutes until assistance arrives, Disp-0.2 mL, R-0, E-Prescribe      nystatin (MYCOSTATIN) 405460 UNIT/GM external cream APPLY TOPICALLY 2 TIMES DAILYDisp-30 g, [id number]      ondansetron (ZOFRAN-ODT) 8 MG ODT tab DISSOLVE ONE TABLET ON TONGUE EVERY 8 HOURS AS NEEDED FOR NAUSEA, Disp-90 tablet, R-1, E-Prescribe      pantoprazole (PROTONIX) 40 MG EC tablet Take 1 tablet (40 mg) by mouth daily, Disp-30 tablet, R-5, E-Prescribe      !! parenteral nutrition - PTA/DISCHARGE ORDER Patient receives 2050 mL TPN every 14 hours through PICC at [facility] Historical      polyethylene glycol (MIRALAX) 17 GM/Dose powder Take 17 g by mouth daily, Disp-1020 g, R-3, E-Prescribe(2 bottles)      sucralfate (CARAFATE) 1 GM/10ML suspension TAKE 10MLS  BY MOUTH FOUR TIMES A DAY AS NEEDED, Disp-420 mL, R-1, E-Prescribe      !! VENTOLIN  (90 Base) MCG/ACT inhaler INHALE TWO PUFFS BY MOUTH EVERY 4 HOURS AS NEEDED FOR SHORTNESS OF BREATH /DYSPNEA OR WHEEZING, Disp-18 g, R-0, E-Prescribe      vitamin D2 (ERGOCALCIFEROL) 45771 units (1250 mcg) capsule TAKE 1 CAPSULE (50,000 UNITS) BY MOUTH ONCE A WEEK, Disp-12 capsule, R-3, E-Prescribe      bisacodyl (DULCOLAX) 5 MG EC tablet Take as directed. One day prior to exam at 10:00am take 2 tablets, Disp-2 tablet, R-0, E-Prescribe      fentaNYL (DURAGESIC) 25 mcg/hr 72 hr patch Place 1 patch onto the skin every 48 hours remove old patch. Fill 12/31/21 and start 01/02/21., Disp-15 patch, R-0, E-Prescribe      magnesium citrate solution Take as directed. Two days prior to exam drink 10oz bottle of magnesium  "citrate at 4:00pm, Disp-296 mL, R-0, E-Prescribe      oxyCODONE (ROXICODONE) 5 MG/5ML solution Take 5-10 mLs (5-10 mg) by mouth 3 times daily as needed for pain Max of 30mg/day. Put at least 4 hours between doses. Fill 12/31/21 and start 01/02/21. 30 day supply., Disp-900 mL, R-0, E-Prescribe      polyethylene glycol (GOLYTELY) 236 g suspension Take as directed. One day before your exam fill the first container with water. Cover and shake until mixed well. At 3:00pm drink one 8oz glass every 10-15 minutes until half of the first container is empty. Store the remainder in the refrigerator. At 8: 00pm drink the second half of the first container until it is gone. Before you go to bed mix the second container with water and put in refrigerator. Six hours before your check in time drink one 8oz glass every 10-15 minutes until half of container is e mpty. Discard the remainder of solution., Disp-8000 mL, R-0, E-PrescribePharmacy may substitute for equivalent. Not a duplicate. Needs two 4,000ml jugs for extended colon prep      !! [facility] MANAGED PATIENT Contact [facility] for patient specific medication information at [phone] on admission and discharge from the hospital.  Phones are answered 24 hours a day 7 days a week 365 days a year.    Providers - Choose \"CONTINUE HOME MED (no scr ipt)\" at discharge if patient treatment with home infusion will continue., No Print OutResume prior to admission TPN/Lipids/saline      insulin syringe-needle U-100 (29G X 1/2\" 1 ML) 29G X 1/2\" 1 ML miscellaneous Use to inject b12 every 30 days, Disp-3 each, R-4, E-Prescribe       !! - Potential duplicate medications found. Please discuss with provider.        Allergies   Allergies   Allergen Reactions     Bactrim [Sulfamethoxazole W/Trimethoprim] Rash     Penicillins Anaphylaxis     Please see Antimicrobial Management Team allergy assessment note 10/10/2018. Patient reported tolerating amoxicillin.     " Doxycycline Rash     Vancomycin Rash     Rash after receiving vancomycin 3/28/16 (infusion reaction?). Tolerated with slower infusion and diphenhydramine premed.

## 2022-02-01 ENCOUNTER — LAB REQUISITION (OUTPATIENT)
Dept: LAB | Facility: CLINIC | Age: 52
End: 2022-02-01
Payer: COMMERCIAL

## 2022-02-01 ENCOUNTER — HOME INFUSION (PRE-WILLOW HOME INFUSION) (OUTPATIENT)
Dept: PHARMACY | Facility: CLINIC | Age: 52
End: 2022-02-01
Payer: COMMERCIAL

## 2022-02-01 DIAGNOSIS — R13.10 DYSPHAGIA, UNSPECIFIED: ICD-10-CM

## 2022-02-01 LAB
ALBUMIN SERPL-MCNC: 3.1 G/DL (ref 3.4–5)
ALP SERPL-CCNC: 66 U/L (ref 40–150)
ALT SERPL W P-5'-P-CCNC: 24 U/L (ref 0–70)
ANION GAP SERPL CALCULATED.3IONS-SCNC: 6 MMOL/L (ref 3–14)
AST SERPL W P-5'-P-CCNC: 12 U/L (ref 0–45)
BASOPHILS # BLD AUTO: 0.1 10E3/UL (ref 0–0.2)
BASOPHILS NFR BLD AUTO: 1 %
BILIRUB SERPL-MCNC: 0.6 MG/DL (ref 0.2–1.3)
BILIRUB SERPL-MCNC: 0.6 MG/DL (ref 0.2–1.3)
BUN SERPL-MCNC: 13 MG/DL (ref 7–30)
CALCIUM SERPL-MCNC: 8.4 MG/DL (ref 8.5–10.1)
CHLORIDE BLD-SCNC: 108 MMOL/L (ref 94–109)
CO2 SERPL-SCNC: 29 MMOL/L (ref 20–32)
CREAT SERPL-MCNC: 0.84 MG/DL (ref 0.66–1.25)
EOSINOPHIL # BLD AUTO: 0.2 10E3/UL (ref 0–0.7)
EOSINOPHIL NFR BLD AUTO: 3 %
ERYTHROCYTE [DISTWIDTH] IN BLOOD BY AUTOMATED COUNT: 14.9 % (ref 10–15)
FASTING STATUS PATIENT QL REPORTED: NORMAL
GFR SERPL CREATININE-BSD FRML MDRD: >90 ML/MIN/1.73M2
GLUCOSE BLD-MCNC: 101 MG/DL (ref 70–99)
HCT VFR BLD AUTO: 35.4 % (ref 40–53)
HGB BLD-MCNC: 11.6 G/DL (ref 13.3–17.7)
IMM GRANULOCYTES # BLD: 0 10E3/UL
IMM GRANULOCYTES NFR BLD: 0 %
LYMPHOCYTES # BLD AUTO: 2.4 10E3/UL (ref 0.8–5.3)
LYMPHOCYTES NFR BLD AUTO: 33 %
MAGNESIUM SERPL-MCNC: 1.8 MG/DL (ref 1.6–2.3)
MCH RBC QN AUTO: 30.4 PG (ref 26.5–33)
MCHC RBC AUTO-ENTMCNC: 32.8 G/DL (ref 31.5–36.5)
MCV RBC AUTO: 93 FL (ref 78–100)
MONOCYTES # BLD AUTO: 0.9 10E3/UL (ref 0–1.3)
MONOCYTES NFR BLD AUTO: 12 %
NEUTROPHILS # BLD AUTO: 3.5 10E3/UL (ref 1.6–8.3)
NEUTROPHILS NFR BLD AUTO: 51 %
NRBC # BLD AUTO: 0 10E3/UL
NRBC BLD AUTO-RTO: 0 /100
PHOSPHATE SERPL-MCNC: 4.4 MG/DL (ref 2.5–4.5)
PLATELET # BLD AUTO: 173 10E3/UL (ref 150–450)
POTASSIUM BLD-SCNC: 3.8 MMOL/L (ref 3.4–5.3)
PROT SERPL-MCNC: 6.3 G/DL (ref 6.8–8.8)
RBC # BLD AUTO: 3.81 10E6/UL (ref 4.4–5.9)
SODIUM SERPL-SCNC: 143 MMOL/L (ref 133–144)
TRIGL SERPL-MCNC: 83 MG/DL
WBC # BLD AUTO: 7 10E3/UL (ref 4–11)

## 2022-02-01 PROCEDURE — 85025 COMPLETE CBC W/AUTO DIFF WBC: CPT | Performed by: SURGERY

## 2022-02-01 PROCEDURE — 80053 COMPREHEN METABOLIC PANEL: CPT | Performed by: SURGERY

## 2022-02-01 PROCEDURE — 83735 ASSAY OF MAGNESIUM: CPT | Performed by: SURGERY

## 2022-02-01 PROCEDURE — 84478 ASSAY OF TRIGLYCERIDES: CPT | Mod: GZ | Performed by: SURGERY

## 2022-02-01 PROCEDURE — 84100 ASSAY OF PHOSPHORUS: CPT | Performed by: SURGERY

## 2022-02-02 ENCOUNTER — HOME INFUSION (PRE-WILLOW HOME INFUSION) (OUTPATIENT)
Dept: PHARMACY | Facility: CLINIC | Age: 52
End: 2022-02-02

## 2022-02-02 DIAGNOSIS — F90.0 ADHD (ATTENTION DEFICIT HYPERACTIVITY DISORDER), INATTENTIVE TYPE: ICD-10-CM

## 2022-02-02 NOTE — TELEPHONE ENCOUNTER
Pending Prescriptions:                       Disp   Refills    LORazepam (ATIVAN) 1 MG tablet [Pharmacy M*30 tab*0        Sig: TAKE 1 TABLET (1MG) BY MOUTH AS NEEDED FOR ANXIETY           WITH TPN AND MEDICATIONS . JUST ONCE A DAY (30 TO           LAST 30 DAYS)    ADDERALL 20 MG tablet [Pharmacy Med Name: *30 tab*0        Sig: TAKE ONE TABLET BY MOUTH ONCE DAILY    Routing refill request to provider for review/approval because:  Drug not on the [facility] refill protocol , Med due on 2/5/22

## 2022-02-03 RX ORDER — LORAZEPAM 1 MG/1
TABLET ORAL
Qty: 30 TABLET | Refills: 0 | Status: SHIPPED | OUTPATIENT
Start: 2022-02-03 | End: 2022-03-02

## 2022-02-03 RX ORDER — DEXTROAMPHETAMINE SACCHARATE, AMPHETAMINE ASPARTATE, DEXTROAMPHETAMINE SULFATE AND AMPHETAMINE SULFATE 5; 5; 5; 5 MG/1; MG/1; MG/1; MG/1
TABLET ORAL
Qty: 30 TABLET | Refills: 0 | Status: SHIPPED | OUTPATIENT
Start: 2022-02-03 | End: 2022-03-02

## 2022-02-07 ENCOUNTER — TELEPHONE (OUTPATIENT)
Dept: INTERNAL MEDICINE | Facility: CLINIC | Age: 52
End: 2022-02-07
Payer: COMMERCIAL

## 2022-02-07 NOTE — TELEPHONE ENCOUNTER
Reason for Call:  Form, our goal is to have forms completed with 72 hours, however, some forms may require a visit or additional information.    Type of letter, form or note:  Home Health Certification    Who is the form from?: Home care    Where did the form come from: form was faxed in    What clinic location was the form placed at?: [facility]     Where the form was placed: Given to MA/[place] [place]    What number is listed as a contact on the form?: [phone]       Additional comments    Call taken on 2/7/2022 at 8:57 AM by Nafisa Chen

## 2022-02-08 ENCOUNTER — HOME INFUSION (PRE-WILLOW HOME INFUSION) (OUTPATIENT)
Dept: PHARMACY | Facility: CLINIC | Age: 52
End: 2022-02-08
Payer: COMMERCIAL

## 2022-02-08 NOTE — PROGRESS NOTES
This is a recent snapshot of the patient's [facility] medical record.  For current drug dose and complete information and questions, call [phone] or In Basket pool, [facility] home infusion ([id number])  CSN Number:  [id number]

## 2022-02-09 ENCOUNTER — OFFICE VISIT (OUTPATIENT)
Dept: PALLIATIVE MEDICINE | Facility: CLINIC | Age: 52
End: 2022-02-09
Payer: COMMERCIAL

## 2022-02-09 ENCOUNTER — TELEPHONE (OUTPATIENT)
Dept: PALLIATIVE MEDICINE | Facility: CLINIC | Age: 52
End: 2022-02-09

## 2022-02-09 VITALS — DIASTOLIC BLOOD PRESSURE: 88 MMHG | SYSTOLIC BLOOD PRESSURE: 132 MMHG | HEART RATE: 76 BPM

## 2022-02-09 DIAGNOSIS — E43 SEVERE MALNUTRITION (H): ICD-10-CM

## 2022-02-09 DIAGNOSIS — Z79.891 ENCOUNTER FOR LONG-TERM OPIATE ANALGESIC USE: ICD-10-CM

## 2022-02-09 DIAGNOSIS — R10.9 CHRONIC ABDOMINAL PAIN: Primary | ICD-10-CM

## 2022-02-09 DIAGNOSIS — G89.29 CHRONIC ABDOMINAL PAIN: Primary | ICD-10-CM

## 2022-02-09 PROCEDURE — 99214 OFFICE O/P EST MOD 30 MIN: CPT | Performed by: PSYCHIATRY & NEUROLOGY

## 2022-02-09 ASSESSMENT — PAIN SCALES - GENERAL: PAINLEVEL: MODERATE PAIN (4)

## 2022-02-09 NOTE — TELEPHONE ENCOUNTER
Parker brought into office visit today a notice that his insurance is not wanting to cover liquid oxycodone.    Letter provided to me to request a coverage determination, including exception.    Letter written, given to MA at [place].  Will have the notice scanned in for future reference.    Jessica Milligan MD  [facility]

## 2022-02-09 NOTE — PROGRESS NOTES
[facility]    Date of visit: 2/9/2022      Chief complaint:    Chief Complaint   Patient presents with     Pain       Interval history:  Parker Acevedo was last seen by me on 8/11/21    Recommendations/plan at the last visit included:  1. Physical therapy- not at this time   2. Medications: no changes. Next visit, new Narcan. Refills provided for miralax and lidocaine  3. Follow up in 3 months-  in person  1.     Since his last visit, Parker Acevedo reports:  -overall he has been doing well  -he was in the hospital for a blood infection.  -he was cleaning and found the previous missing med from years ago- buprenorphine.  -ok with current plan.  -his lorazepam use has decreased some- he doesn't take it regular.    Pain scores:  Moderate Pain (4)     Current pain treatments:  Fentanyl 25mcg/hr patch q48 hours -  Previously was at 50mcg/hr  Lidocaine patches- as needed  Naloxone- has from previous hospitalization.  Oxycodone max of 30mg a day.-  miralax    Previous medication treatments included:   Valium 5 mg/day, 1 tab in AM and PM-stopped in 11/2017  Tylenol #3   Vicodin   Tramadol   Diazepam- for sleep/anxiety  Gabapentin- bad headaches, acid reflux  Oxycodone max of 45mg/day.        Side Effects: no side effects    Medications:  Current Outpatient Medications   Medication Sig Dispense Refill     acetaminophen (TYLENOL) 32 mg/mL liquid Take 15.65 mLs (500 mg) by mouth every 4 hours as needed for fever or mild pain 455 mL 1     albuterol (PROAIR HFA/PROVENTIL HFA/VENTOLIN HFA) 108 (90 Base) MCG/ACT inhaler Inhale 2 puffs into the lungs every 6 hours as needed       amphetamine-dextroamphetamine (ADDERALL) 20 MG tablet TAKE ONE TABLET BY MOUTH ONCE DAILY 30 tablet 0     carvedilol (COREG) 6.25 MG tablet Take 6.25 mg by mouth 2 times daily (with meals)       [facility] PAT[facility] Contact [facility] for patient specific  "medication information at [phone] on admission and discharge from the hospital.  Phones are answered 24 hours a day 7 days a week 365 days a year.    Providers - Choose \"CONTINUE HOME MED (no script)\" at discharge if patient treatment with home infusion will continue.       fentaNYL (DURAGESIC) 25 mcg/hr 72 hr patch Place 1 patch onto the skin every 48 hours remove old patch. Fill 01/30/22 and start 02/01/22. 15 patch 0     insulin syringe-needle U-100 (29G X 1/2\" 1 ML) 29G X 1/2\" 1 ML miscellaneous Use to inject b12 every 30 days 3 each 4     lactated ringers infusion Inject 1,000-2,000 mLs into the vein daily as needed       lidocaine (LIDODERM) 5 % patch Place 1-2 patches onto the skin every 24 hours Wear for 12 hours, remove for 12 hours.  OK to cut to better fit to size. 60 patch 6     LORazepam (ATIVAN) 1 MG tablet TAKE 1 TABLET (1MG) BY MOUTH AS NEEDED FOR ANXIETY WITH TPN AND MEDICATIONS . JUST ONCE A DAY (30 TO LAST 30 DAYS) 30 tablet 0     nystatin (MYCOSTATIN) 721181 UNIT/GM external cream APPLY TOPICALLY 2 TIMES DAILY 30 g 0     ondansetron (ZOFRAN-ODT) 8 MG ODT tab DISSOLVE ONE TABLET ON TONGUE EVERY 8 HOURS AS NEEDED FOR NAUSEA 90 tablet 1     oxyCODONE (ROXICODONE) 5 MG/5ML solution Take 5-10 mLs (5-10 mg) by mouth 3 times daily as needed for pain Max of 30mg/day. Put at least 4 hours between doses. Fill 01/30/22 and start 02/01/22. 30 day supply. 900 mL 0     pantoprazole (PROTONIX) 40 MG EC tablet Take 1 tablet (40 mg) by mouth daily 30 tablet 5     parenteral nutrition - PTA/DISCHARGE ORDER Patient receives 2050 mL TPN every 14 hours through PICC at [facility].       polyethylene glycol (MIRALAX) 17 GM/Dose powder Take 17 g by mouth daily 1020 g 3     sucralfate (CARAFATE) 1 GM/10ML suspension TAKE 10MLS  BY MOUTH FOUR TIMES A DAY AS NEEDED 420 mL 1     VENTOLIN  (90 Base) MCG/ACT inhaler INHALE TWO PUFFS BY MOUTH EVERY 4 HOURS AS NEEDED FOR SHORTNESS OF BREATH /DYSPNEA OR " WHEEZING 18 g 0     vitamin D2 (ERGOCALCIFEROL) 22881 units (1250 mcg) capsule TAKE 1 CAPSULE (50,000 UNITS) BY MOUTH ONCE A WEEK 12 capsule 3     cyanocobalamin (CYANOCOBALAMIN) 1000 MCG/ML injection INJECT 1 ML INTO THE MUSCLE EVERY 30 DAYS (Patient not taking: Reported on 2/9/2022) 1 mL 3     diphenhydrAMINE (BENADRYL) 12.5 MG/5ML syrup Take 25 mg by mouth every 4 hours as needed for itching, allergies or sleep (Patient not taking: Reported on 2/9/2022) 237 mL 0     EPINEPHrine (ANY BX GENERIC EQUIV) 0.3 MG/0.3ML injection 2-pack  (Patient not taking: Reported on 2/9/2022)       naloxone (NARCAN) 4 MG/0.1ML nasal spray Spray 1 spray (4 mg) into one nostril alternating nostrils once as needed for opioid reversal every 2-3 minutes until assistance arrives (Patient not taking: Reported on 2/9/2022) 0.2 mL 0       Medical History: any changes in medical history since they were last seen? Blood infection    Physical exam:  /88   Pulse 76   Gen: awake, alert   Gait: forward flexed  MSK exam: poor posture.   Tubing from G tube dislodged. Mild amoutn of swelling, but no redness/drainage at abdomen.    THE 4 A's OF OPIOID MAINTENANCE ANALGESIA     Analgesia: adequate    Activity: on disability    Adverse effects: none    Adherence to Rx protocol: good- [place] Prescription Monitoring Program checked 2/9/2022   appropriate    Last UDS and opioid agreement - 1/5/22      Assessment:   1. Chronic abdominal pain, with history of gastric bypass and later peptic ulcer   2. G tube placement- only used for venting  3. Myofascial abdominal pain, with significant guarding and poor posture  4. Opioid tolerance  5. Anxiety  6. Foot pain with tendonous injury- healed  7. Left arm weakness, consistent with radial nerve injury- improving  8. Port placement- h/o recurrent infections, getting TPN    Plan:   1. Physical therapy- not at this time   2. Medications: no changes. Letter written to get exception for oxycodone liquid- in  separate encounter.  3. Follow up in 3 months-  Virtual ok.      36 minutes spent on the date of encounter doing chart review, history, and exam documentation and further activities as noted above.     Jessica Milligan MD  [facility]

## 2022-02-09 NOTE — PATIENT INSTRUCTIONS
Follow up in 3 months.    ----------------------------------------------------------------  Clinic Number:  [phone]     Call with any questions about your care and for scheduling assistance.     Calls are returned Monday through Friday between 8 AM and 4:30 PM. We usually get back to you within 2 business days depending on the issue/request.    If we are prescribing your medications:    For opioid medication refills, call the clinic or send a [employer] message 7 days in advance.  Please include:    Name of requested medication    Name of the pharmacy.    For non-opioid medications, call your pharmacy directly to request a refill. Please allow 3-4 days to be processed.     Per [place] State Law:    All controlled substance prescriptions must be filled within 30 days of being written.      For those controlled substances allowing refills, pickup must occur within 30 days of last fill.      We believe regular attendance is key to your success in our program!      Any time you are unable to keep your appointment we ask that you call us at least 24 hours in advance to cancel.This will allow us to offer the appointment time to another patient.     Multiple missed appointments may lead to dismissal from the clinic.

## 2022-02-11 DIAGNOSIS — Z53.9 DIAGNOSIS NOT YET DEFINED: Primary | ICD-10-CM

## 2022-02-11 DIAGNOSIS — Z11.59 ENCOUNTER FOR SCREENING FOR OTHER VIRAL DISEASES: Primary | ICD-10-CM

## 2022-02-11 PROCEDURE — G0179 MD RECERTIFICATION HHA PT: HCPCS | Performed by: INTERNAL MEDICINE

## 2022-02-15 ENCOUNTER — HOME INFUSION (PRE-WILLOW HOME INFUSION) (OUTPATIENT)
Dept: PHARMACY | Facility: CLINIC | Age: 52
End: 2022-02-15

## 2022-02-15 ENCOUNTER — LAB REQUISITION (OUTPATIENT)
Dept: LAB | Facility: CLINIC | Age: 52
End: 2022-02-15
Payer: COMMERCIAL

## 2022-02-15 DIAGNOSIS — R13.10 DYSPHAGIA, UNSPECIFIED: ICD-10-CM

## 2022-02-15 LAB
ALBUMIN SERPL-MCNC: 2.8 G/DL (ref 3.4–5)
ALP SERPL-CCNC: 57 U/L (ref 40–150)
ALT SERPL W P-5'-P-CCNC: 19 U/L (ref 0–70)
ANION GAP SERPL CALCULATED.3IONS-SCNC: 2 MMOL/L (ref 3–14)
AST SERPL W P-5'-P-CCNC: 9 U/L (ref 0–45)
BASOPHILS # BLD AUTO: 0 10E3/UL (ref 0–0.2)
BASOPHILS NFR BLD AUTO: 1 %
BILIRUB DIRECT SERPL-MCNC: 0.1 MG/DL (ref 0–0.2)
BILIRUB SERPL-MCNC: 0.4 MG/DL (ref 0.2–1.3)
BUN SERPL-MCNC: 15 MG/DL (ref 7–30)
CALCIUM SERPL-MCNC: 7.8 MG/DL (ref 8.5–10.1)
CHLORIDE BLD-SCNC: 112 MMOL/L (ref 94–109)
CO2 SERPL-SCNC: 30 MMOL/L (ref 20–32)
CREAT SERPL-MCNC: 0.52 MG/DL (ref 0.66–1.25)
EOSINOPHIL # BLD AUTO: 0.2 10E3/UL (ref 0–0.7)
EOSINOPHIL NFR BLD AUTO: 4 %
ERYTHROCYTE [DISTWIDTH] IN BLOOD BY AUTOMATED COUNT: 14.3 % (ref 10–15)
FASTING STATUS PATIENT QL REPORTED: YES
GFR SERPL CREATININE-BSD FRML MDRD: >90 ML/MIN/1.73M2
GLUCOSE BLD-MCNC: 78 MG/DL (ref 70–99)
HCT VFR BLD AUTO: 32.3 % (ref 40–53)
HGB BLD-MCNC: 10.5 G/DL (ref 13.3–17.7)
IMM GRANULOCYTES # BLD: 0 10E3/UL
IMM GRANULOCYTES NFR BLD: 0 %
LYMPHOCYTES # BLD AUTO: 1.8 10E3/UL (ref 0.8–5.3)
LYMPHOCYTES NFR BLD AUTO: 34 %
MAGNESIUM SERPL-MCNC: 2.1 MG/DL (ref 1.6–2.3)
MCH RBC QN AUTO: 30.2 PG (ref 26.5–33)
MCHC RBC AUTO-ENTMCNC: 32.5 G/DL (ref 31.5–36.5)
MCV RBC AUTO: 93 FL (ref 78–100)
MONOCYTES # BLD AUTO: 0.8 10E3/UL (ref 0–1.3)
MONOCYTES NFR BLD AUTO: 15 %
NEUTROPHILS # BLD AUTO: 2.6 10E3/UL (ref 1.6–8.3)
NEUTROPHILS NFR BLD AUTO: 46 %
NRBC # BLD AUTO: 0 10E3/UL
NRBC BLD AUTO-RTO: 0 /100
PHOSPHATE SERPL-MCNC: 3.8 MG/DL (ref 2.5–4.5)
PLATELET # BLD AUTO: 138 10E3/UL (ref 150–450)
POTASSIUM BLD-SCNC: 3.5 MMOL/L (ref 3.4–5.3)
PROT SERPL-MCNC: 5.8 G/DL (ref 6.8–8.8)
RBC # BLD AUTO: 3.48 10E6/UL (ref 4.4–5.9)
SODIUM SERPL-SCNC: 144 MMOL/L (ref 133–144)
TRIGL SERPL-MCNC: 50 MG/DL
WBC # BLD AUTO: 5.5 10E3/UL (ref 4–11)

## 2022-02-15 PROCEDURE — 80053 COMPREHEN METABOLIC PANEL: CPT | Performed by: SURGERY

## 2022-02-15 PROCEDURE — 36592 COLLECT BLOOD FROM PICC: CPT | Performed by: SURGERY

## 2022-02-15 PROCEDURE — 84100 ASSAY OF PHOSPHORUS: CPT | Performed by: SURGERY

## 2022-02-15 PROCEDURE — 85025 COMPLETE CBC W/AUTO DIFF WBC: CPT | Performed by: SURGERY

## 2022-02-15 PROCEDURE — 83735 ASSAY OF MAGNESIUM: CPT | Performed by: SURGERY

## 2022-02-15 PROCEDURE — 84478 ASSAY OF TRIGLYCERIDES: CPT | Performed by: SURGERY

## 2022-02-15 PROCEDURE — 82248 BILIRUBIN DIRECT: CPT | Performed by: SURGERY

## 2022-02-16 ENCOUNTER — HOME INFUSION (PRE-WILLOW HOME INFUSION) (OUTPATIENT)
Dept: PHARMACY | Facility: CLINIC | Age: 52
End: 2022-02-16

## 2022-02-17 ENCOUNTER — HOME INFUSION (PRE-WILLOW HOME INFUSION) (OUTPATIENT)
Dept: PHARMACY | Facility: CLINIC | Age: 52
End: 2022-02-17

## 2022-02-21 ENCOUNTER — MYC REFILL (OUTPATIENT)
Dept: PALLIATIVE MEDICINE | Facility: CLINIC | Age: 52
End: 2022-02-21
Payer: COMMERCIAL

## 2022-02-21 DIAGNOSIS — G89.4 CHRONIC PAIN SYNDROME: ICD-10-CM

## 2022-02-21 DIAGNOSIS — G89.29 CHRONIC ABDOMINAL PAIN: ICD-10-CM

## 2022-02-21 DIAGNOSIS — R10.9 CHRONIC ABDOMINAL PAIN: ICD-10-CM

## 2022-02-21 NOTE — TELEPHONE ENCOUNTER
Received call from patient requesting refill(s) of oxyCODONE (ROXICODONE) 5 MG/5ML solution   fentaNYL (DURAGESIC) 25 mcg/hr 72 hr patch    Both last dispensed from pharmacy on 01/31/22    Patient's last office/virtual visit by prescribing provider on 02/09/22    Next office/virtual appointment scheduled for None    Last urine drug screen date 01/05/22  Current opioid agreement on file (completed within the last year) Yes Date of opioid agreement: 01/05/22    E-prescribe to   [facility] [address] [address]  Phone: [phone] Fax: [phone]    Will route to nursing pool for review and preparation of prescription(s).     Shaina Lam [facility]

## 2022-02-21 NOTE — TELEPHONE ENCOUNTER
Refills have been requested for the following medications:         oxyCODONE (ROXICODONE) 5 MG/5ML solution [Patti Milligan MD]         fentaNYL (DURAGESIC) 25 mcg/hr 72 hr patch [Patti Milligan MD]     Preferred pharmacy: [address]

## 2022-02-22 RX ORDER — OXYCODONE HCL 5 MG/5 ML
5-10 SOLUTION, ORAL ORAL 3 TIMES DAILY PRN
Qty: 900 ML | Refills: 0 | Status: SHIPPED | OUTPATIENT
Start: 2022-02-22 | End: 2022-03-21

## 2022-02-22 RX ORDER — FENTANYL 25 UG/1
1 PATCH TRANSDERMAL
Qty: 15 PATCH | Refills: 0 | Status: SHIPPED | OUTPATIENT
Start: 2022-02-22 | End: 2022-03-21

## 2022-02-22 NOTE — TELEPHONE ENCOUNTER
Medication refill information reviewed.     Due date for oxyCODONE (ROXICODONE) 5 MG/5ML solution and fentaNYL (DURAGESIC) 25 mcg/hr 72 hr patch is 03/03/22     Prescriptions prepped for review.     Will route to provider.

## 2022-02-22 NOTE — TELEPHONE ENCOUNTER
Called pt, notified of Rx for oxyCODONE (ROXICODONE) 5 MG/5ML solution; and fentaNYL (DURAGESIC) 25 mcg/hr 72 hr patch was signed and sent to [facility] [place]. Fill 03/01/22 and start 03/03/22.    Lyndsay Solo MA

## 2022-02-22 NOTE — TELEPHONE ENCOUNTER
Script Eprescribed to pharmacy  [place] Prescription Monitoring [place]    Will send this to MA team to notify patient.    Signed Prescriptions:                        Disp   Refills    oxyCODONE (ROXICODONE) 5 MG/5ML solution   900 mL 0        Sig: Take 5-10 mLs (5-10 mg) by mouth 3 times daily as           needed for pain Max of 30mg/day. Put at least 4           hours between doses. Fill 03/01/22 and start           03/03/22. 30 day supply.  Authorizing Provider: BRANDYN JENKINS    fentaNYL (DURAGESIC) 25 mcg/hr 72 hr patch 15 pat*0        Sig: Place 1 patch onto the skin every 48 hours remove old           patch. Fill 03/01/22 and start 03/03/22.  Authorizing Provider: BRANDYN JENKINS MD  [facility]

## 2022-02-23 ENCOUNTER — OFFICE VISIT (OUTPATIENT)
Dept: INTERNAL MEDICINE | Facility: CLINIC | Age: 52
End: 2022-02-23
Payer: COMMERCIAL

## 2022-02-23 ENCOUNTER — MYC MEDICAL ADVICE (OUTPATIENT)
Dept: INTERNAL MEDICINE | Facility: CLINIC | Age: 52
End: 2022-02-23
Payer: COMMERCIAL

## 2022-02-23 ENCOUNTER — HOME INFUSION (PRE-WILLOW HOME INFUSION) (OUTPATIENT)
Dept: PHARMACY | Facility: CLINIC | Age: 52
End: 2022-02-23

## 2022-02-23 ENCOUNTER — HOSPITAL ENCOUNTER (OUTPATIENT)
Dept: ULTRASOUND IMAGING | Facility: CLINIC | Age: 52
Discharge: HOME | End: 2022-02-23
Attending: INTERNAL MEDICINE | Admitting: INTERNAL MEDICINE
Payer: COMMERCIAL

## 2022-02-23 ENCOUNTER — HOSPITAL ENCOUNTER (INPATIENT)
Facility: CLINIC | Age: 52
LOS: 5 days | Discharge: HOME HEALTH | DRG: 315 | End: 2022-02-28
Attending: EMERGENCY MEDICINE | Admitting: STUDENT IN AN ORGANIZED HEALTH CARE EDUCATION/TRAINING PROGRAM
Payer: COMMERCIAL

## 2022-02-23 VITALS
RESPIRATION RATE: 18 BRPM | TEMPERATURE: 98.6 F | DIASTOLIC BLOOD PRESSURE: 86 MMHG | HEIGHT: 71 IN | SYSTOLIC BLOOD PRESSURE: 124 MMHG | WEIGHT: 190.1 LBS | HEART RATE: 88 BPM | BODY MASS INDEX: 26.61 KG/M2 | OXYGEN SATURATION: 98 %

## 2022-02-23 DIAGNOSIS — M79.89 PAIN AND SWELLING OF RIGHT UPPER EXTREMITY: ICD-10-CM

## 2022-02-23 DIAGNOSIS — T82.514A HICKMAN CATHETER DYSFUNCTION, INITIAL ENCOUNTER (H): ICD-10-CM

## 2022-02-23 DIAGNOSIS — M79.89 PAIN AND SWELLING OF RIGHT UPPER EXTREMITY: Primary | ICD-10-CM

## 2022-02-23 DIAGNOSIS — I82.A11 ACUTE DEEP VEIN THROMBOSIS (DVT) OF AXILLARY VEIN OF RIGHT UPPER EXTREMITY (H): Primary | ICD-10-CM

## 2022-02-23 DIAGNOSIS — Z78.9 ON TOTAL PARENTERAL NUTRITION: ICD-10-CM

## 2022-02-23 DIAGNOSIS — Z20.822 COVID-19 RULED OUT BY LABORATORY TESTING: ICD-10-CM

## 2022-02-23 DIAGNOSIS — Z98.84 S/P BARIATRIC SURGERY: ICD-10-CM

## 2022-02-23 DIAGNOSIS — I82.629 ACUTE DEEP VEIN THROMBOSIS (DVT) OF UPPER EXTREMITY, UNSPECIFIED LATERALITY, UNSPECIFIED VEIN (H): ICD-10-CM

## 2022-02-23 DIAGNOSIS — I82.621 ACUTE DEEP VEIN THROMBOSIS (DVT) OF RIGHT UPPER EXTREMITY, UNSPECIFIED VEIN (H): ICD-10-CM

## 2022-02-23 DIAGNOSIS — M79.601 PAIN AND SWELLING OF RIGHT UPPER EXTREMITY: Primary | ICD-10-CM

## 2022-02-23 DIAGNOSIS — M79.601 PAIN AND SWELLING OF RIGHT UPPER EXTREMITY: ICD-10-CM

## 2022-02-23 PROBLEM — O22.30 DVT (DEEP VEIN THROMBOSIS) IN PREGNANCY: Status: ACTIVE | Noted: 2022-02-23

## 2022-02-23 LAB
ALBUMIN SERPL-MCNC: 3.5 G/DL (ref 3.4–5)
ALP SERPL-CCNC: 85 U/L (ref 40–150)
ALT SERPL W P-5'-P-CCNC: 44 U/L (ref 0–70)
ANION GAP SERPL CALCULATED.3IONS-SCNC: 2 MMOL/L (ref 3–14)
ANION GAP SERPL CALCULATED.3IONS-SCNC: 8 MMOL/L (ref 3–14)
APTT PPP: 29 SECONDS (ref 22–38)
AST SERPL W P-5'-P-CCNC: 28 U/L (ref 0–45)
BASOPHILS # BLD AUTO: 0 10E3/UL (ref 0–0.2)
BASOPHILS # BLD AUTO: 0.1 10E3/UL (ref 0–0.2)
BASOPHILS NFR BLD AUTO: 0 %
BASOPHILS NFR BLD AUTO: 0 %
BILIRUB SERPL-MCNC: 0.7 MG/DL (ref 0.2–1.3)
BUN SERPL-MCNC: 12 MG/DL (ref 7–30)
BUN SERPL-MCNC: 14 MG/DL (ref 7–30)
CALCIUM SERPL-MCNC: 8.4 MG/DL (ref 8.5–10.1)
CALCIUM SERPL-MCNC: 8.9 MG/DL (ref 8.5–10.1)
CHLORIDE BLD-SCNC: 108 MMOL/L (ref 94–109)
CHLORIDE BLD-SCNC: 109 MMOL/L (ref 94–109)
CO2 SERPL-SCNC: 23 MMOL/L (ref 20–32)
CO2 SERPL-SCNC: 29 MMOL/L (ref 20–32)
CREAT SERPL-MCNC: 0.78 MG/DL (ref 0.66–1.25)
CREAT SERPL-MCNC: 0.81 MG/DL (ref 0.66–1.25)
CRP SERPL-MCNC: <2.9 MG/L (ref 0–8)
EOSINOPHIL # BLD AUTO: 0.1 10E3/UL (ref 0–0.7)
EOSINOPHIL # BLD AUTO: 0.2 10E3/UL (ref 0–0.7)
EOSINOPHIL NFR BLD AUTO: 1 %
EOSINOPHIL NFR BLD AUTO: 1 %
ERYTHROCYTE [DISTWIDTH] IN BLOOD BY AUTOMATED COUNT: 14.8 % (ref 10–15)
ERYTHROCYTE [DISTWIDTH] IN BLOOD BY AUTOMATED COUNT: 14.9 % (ref 10–15)
ERYTHROCYTE [SEDIMENTATION RATE] IN BLOOD BY WESTERGREN METHOD: 10 MM/HR (ref 0–15)
GFR SERPL CREATININE-BSD FRML MDRD: >90 ML/MIN/1.73M2
GFR SERPL CREATININE-BSD FRML MDRD: >90 ML/MIN/1.73M2
GLUCOSE BLD-MCNC: 59 MG/DL (ref 70–99)
GLUCOSE BLD-MCNC: 99 MG/DL (ref 70–99)
HCT VFR BLD AUTO: 40.6 % (ref 40–53)
HCT VFR BLD AUTO: 41.9 % (ref 40–53)
HGB BLD-MCNC: 13.1 G/DL (ref 13.3–17.7)
HGB BLD-MCNC: 13.7 G/DL (ref 13.3–17.7)
HOLD SPECIMEN: NORMAL
IMM GRANULOCYTES # BLD: 0.1 10E3/UL
IMM GRANULOCYTES # BLD: 0.1 10E3/UL
IMM GRANULOCYTES NFR BLD: 0 %
IMM GRANULOCYTES NFR BLD: 1 %
INR PPP: 1.05 (ref 0.85–1.15)
LACTATE SERPL-SCNC: 2.3 MMOL/L (ref 0.7–2)
LYMPHOCYTES # BLD AUTO: 2.1 10E3/UL (ref 0.8–5.3)
LYMPHOCYTES # BLD AUTO: 3.1 10E3/UL (ref 0.8–5.3)
LYMPHOCYTES NFR BLD AUTO: 20 %
LYMPHOCYTES NFR BLD AUTO: 22 %
MCH RBC QN AUTO: 29.4 PG (ref 26.5–33)
MCH RBC QN AUTO: 30 PG (ref 26.5–33)
MCHC RBC AUTO-ENTMCNC: 32.3 G/DL (ref 31.5–36.5)
MCHC RBC AUTO-ENTMCNC: 32.7 G/DL (ref 31.5–36.5)
MCV RBC AUTO: 91 FL (ref 78–100)
MCV RBC AUTO: 92 FL (ref 78–100)
MONOCYTES # BLD AUTO: 1.1 10E3/UL (ref 0–1.3)
MONOCYTES # BLD AUTO: 1.7 10E3/UL (ref 0–1.3)
MONOCYTES NFR BLD AUTO: 10 %
MONOCYTES NFR BLD AUTO: 12 %
NEUTROPHILS # BLD AUTO: 7 10E3/UL (ref 1.6–8.3)
NEUTROPHILS # BLD AUTO: 9.1 10E3/UL (ref 1.6–8.3)
NEUTROPHILS NFR BLD AUTO: 65 %
NEUTROPHILS NFR BLD AUTO: 68 %
NRBC # BLD AUTO: 0 10E3/UL
NRBC # BLD AUTO: 0 10E3/UL
NRBC BLD AUTO-RTO: 0 /100
NRBC BLD AUTO-RTO: 0 /100
NT-PROBNP SERPL-MCNC: 71 PG/ML (ref 0–450)
PLATELET # BLD AUTO: 198 10E3/UL (ref 150–450)
PLATELET # BLD AUTO: 209 10E3/UL (ref 150–450)
POTASSIUM BLD-SCNC: 3.5 MMOL/L (ref 3.4–5.3)
POTASSIUM BLD-SCNC: 4 MMOL/L (ref 3.4–5.3)
PROT SERPL-MCNC: 7.4 G/DL (ref 6.8–8.8)
RBC # BLD AUTO: 4.45 10E6/UL (ref 4.4–5.9)
RBC # BLD AUTO: 4.56 10E6/UL (ref 4.4–5.9)
SODIUM SERPL-SCNC: 139 MMOL/L (ref 133–144)
SODIUM SERPL-SCNC: 140 MMOL/L (ref 133–144)
TROPONIN I SERPL HS-MCNC: 3 NG/L
WBC # BLD AUTO: 10.4 10E3/UL (ref 4–11)
WBC # BLD AUTO: 14.2 10E3/UL (ref 4–11)

## 2022-02-23 PROCEDURE — 80053 COMPREHEN METABOLIC PANEL: CPT | Performed by: INTERNAL MEDICINE

## 2022-02-23 PROCEDURE — 99285 EMERGENCY DEPT VISIT HI MDM: CPT | Performed by: EMERGENCY MEDICINE

## 2022-02-23 PROCEDURE — 85610 PROTHROMBIN TIME: CPT | Performed by: EMERGENCY MEDICINE

## 2022-02-23 PROCEDURE — 250N000011 HC RX IP 250 OP 636: Performed by: EMERGENCY MEDICINE

## 2022-02-23 PROCEDURE — 250N000011 HC RX IP 250 OP 636

## 2022-02-23 PROCEDURE — 93971 EXTREMITY STUDY: CPT | Mod: RT

## 2022-02-23 PROCEDURE — 82310 ASSAY OF CALCIUM: CPT | Performed by: EMERGENCY MEDICINE

## 2022-02-23 PROCEDURE — 99207 PR INPT ADMISSION FROM CLINIC: CPT | Performed by: INTERNAL MEDICINE

## 2022-02-23 PROCEDURE — 36415 COLL VENOUS BLD VENIPUNCTURE: CPT | Performed by: INTERNAL MEDICINE

## 2022-02-23 PROCEDURE — 85025 COMPLETE CBC W/AUTO DIFF WBC: CPT | Performed by: INTERNAL MEDICINE

## 2022-02-23 PROCEDURE — 36415 COLL VENOUS BLD VENIPUNCTURE: CPT | Performed by: EMERGENCY MEDICINE

## 2022-02-23 PROCEDURE — 85025 COMPLETE CBC W/AUTO DIFF WBC: CPT | Performed by: EMERGENCY MEDICINE

## 2022-02-23 PROCEDURE — 84484 ASSAY OF TROPONIN QUANT: CPT | Performed by: EMERGENCY MEDICINE

## 2022-02-23 PROCEDURE — 120N000002 HC R&B MED SURG/OB UMMC

## 2022-02-23 PROCEDURE — 86140 C-REACTIVE PROTEIN: CPT | Performed by: INTERNAL MEDICINE

## 2022-02-23 PROCEDURE — 87040 BLOOD CULTURE FOR BACTERIA: CPT | Performed by: EMERGENCY MEDICINE

## 2022-02-23 PROCEDURE — 85652 RBC SED RATE AUTOMATED: CPT | Performed by: INTERNAL MEDICINE

## 2022-02-23 PROCEDURE — 83605 ASSAY OF LACTIC ACID: CPT | Performed by: EMERGENCY MEDICINE

## 2022-02-23 PROCEDURE — 83880 ASSAY OF NATRIURETIC PEPTIDE: CPT | Performed by: EMERGENCY MEDICINE

## 2022-02-23 PROCEDURE — 87040 BLOOD CULTURE FOR BACTERIA: CPT | Performed by: INTERNAL MEDICINE

## 2022-02-23 PROCEDURE — 85730 THROMBOPLASTIN TIME PARTIAL: CPT | Performed by: EMERGENCY MEDICINE

## 2022-02-23 PROCEDURE — 99285 EMERGENCY DEPT VISIT HI MDM: CPT | Mod: 25 | Performed by: EMERGENCY MEDICINE

## 2022-02-23 RX ORDER — APIXABAN 5 MG (74)
KIT ORAL
Qty: 74 EACH | Refills: 0 | Status: ON HOLD | OUTPATIENT
Start: 2022-02-23 | End: 2022-02-28

## 2022-02-23 RX ORDER — HYDROMORPHONE HYDROCHLORIDE 1 MG/ML
0.5 INJECTION, SOLUTION INTRAMUSCULAR; INTRAVENOUS; SUBCUTANEOUS EVERY 30 MIN PRN
Status: DISCONTINUED | OUTPATIENT
Start: 2022-02-23 | End: 2022-02-24

## 2022-02-23 RX ORDER — METOCLOPRAMIDE HYDROCHLORIDE 5 MG/ML
5 INJECTION INTRAMUSCULAR; INTRAVENOUS ONCE
Status: COMPLETED | OUTPATIENT
Start: 2022-02-23 | End: 2022-02-23

## 2022-02-23 RX ORDER — ONDANSETRON 2 MG/ML
INJECTION INTRAMUSCULAR; INTRAVENOUS
Status: COMPLETED
Start: 2022-02-23 | End: 2022-02-23

## 2022-02-23 RX ORDER — ONDANSETRON 2 MG/ML
4 INJECTION INTRAMUSCULAR; INTRAVENOUS ONCE
Status: COMPLETED | OUTPATIENT
Start: 2022-02-23 | End: 2022-02-23

## 2022-02-23 RX ORDER — HEPARIN SODIUM 10000 [USP'U]/100ML
0-5000 INJECTION, SOLUTION INTRAVENOUS CONTINUOUS
Status: DISCONTINUED | OUTPATIENT
Start: 2022-02-23 | End: 2022-02-25

## 2022-02-23 RX ORDER — DIPHENHYDRAMINE HYDROCHLORIDE 50 MG/ML
25 INJECTION INTRAMUSCULAR; INTRAVENOUS ONCE
Status: COMPLETED | OUTPATIENT
Start: 2022-02-23 | End: 2022-02-23

## 2022-02-23 RX ADMIN — METOCLOPRAMIDE HYDROCHLORIDE 5 MG: 5 INJECTION INTRAMUSCULAR; INTRAVENOUS at 23:43

## 2022-02-23 RX ADMIN — DIPHENHYDRAMINE HYDROCHLORIDE 25 MG: 50 INJECTION, SOLUTION INTRAMUSCULAR; INTRAVENOUS at 23:43

## 2022-02-23 RX ADMIN — HYDROMORPHONE HYDROCHLORIDE 0.5 MG: 1 INJECTION, SOLUTION INTRAMUSCULAR; INTRAVENOUS; SUBCUTANEOUS at 23:27

## 2022-02-23 RX ADMIN — ONDANSETRON 4 MG: 2 INJECTION INTRAMUSCULAR; INTRAVENOUS at 22:36

## 2022-02-23 ASSESSMENT — PAIN SCALES - GENERAL: PAINLEVEL: SEVERE PAIN (6)

## 2022-02-23 NOTE — PROGRESS NOTES
Assessment & Plan   Problem List Items Addressed This Visit     S/P bariatric surgery    On total parenteral nutrition    Relevant Orders    CBC with platelets and differential    Comprehensive metabolic panel (BMP + Alb, Alk Phos, ALT, AST, Total. Bili, TP)      Other Visit Diagnoses     Pain and swelling of right upper extremity    -  Primary    Relevant Orders    CBC with platelets and differential    Comprehensive metabolic panel (BMP + Alb, Alk Phos, ALT, AST, Total. Bili, TP)    CRP, inflammation    ESR: Erythrocyte sedimentation rate    Blood Culture Peripheral Blood    Blood Culture Line, Other    US Upper Extremity Venous Duplex Left    Cm catheter dysfunction, initial encounter (H)        Relevant Orders    CBC with platelets and differential    ESR: Erythrocyte sedimentation rate    Blood Culture Peripheral Blood    Blood Culture Line, Other         Is a 51-year-old gentleman with a history of bariatric surgery and unfortunately ended up with some gastric outlet obstruction.  He is on total parenteral nutrition.  Unfortunately has had lots of issues with infected lines and is in the hospital quite frequently.  He currently has a right Cm catheter.  He was feeling good until 3 to 4 days ago when his right arm swelled up he got some pain in the axillary region swelling of the neck and the right arm white impressive versus the left side.  No obvious signs of infection no erythema or warmth.  Concerned about a clot on his Cm line.  He is not on anticoagulation never has had a clot previously.  We will check an ultrasound today, check his labs for sed rate CRP CBC and electrolytes.  We will need to discuss with specialist on the proper treatment for granulated upper extremity clots.  Will likely need to be started on anticoagulant.  I worry about pulling the line in case it would cause the clots to go to his lungs and cause PEs.    Patient will also need to be on TPN and will need IV access.   "Therefore may need hospitalization.  We will also check with 2 blood cultures for possible bacteremia with his previous history.    Ultrasound shows clot in the right upper extremity axillary and cephalic veins into the subclavian and up the internal jugular to the neck  Will start on anticoagulation of eliquis and ask interventional radiology to review this.       Discussed with interventional radiology at the [facility]. They recommend inpatient treatment for possible IV heparin and serial ultrasounds.      When I called the patient and his wife back he is having increased neck pain. He has already taken his Eliquis today for the first dose so has some anticoagulation.  They want to go to the [facility] where he can get full treatment and they know him. They will drive to the ER now for evaluation.       65 minutes spent on the date of the encounter doing chart review, history and exam, documentation and further activities per the note and discussing with colleagues and the [place].        BMI:   Estimated body mass index is 26.51 kg/m  as calculated from the following:    Height as of this encounter: 1.803 m (5' 11\").    Weight as of this encounter: 86.2 kg (190 lb 1.6 oz).           No follow-ups on file.    Cuhy Isaac MD  [facility] MASHA Canales is a 51 year old who presents for the following health issues     Musculoskeletal Problem    History of Present Illness     Reason for visit:  Right arm pain and its swollen  Symptom onset:  Today  Symptoms include:  Pain  Symptom intensity:  Moderate  Symptom progression:  Staying the same  Had these symptoms before:  No    He eats 0-1 servings of fruits and vegetables daily.He consumes 0 sweetened beverage(s) daily.He exercises with enough effort to increase his heart rate 9 or less minutes per day.  He exercises with enough effort to increase his heart rate 3 or less days per week.   He is taking medications regularly.   " "  Right arm swelling and pain under the arm, goes into the chest.      Has a right side tunneled azevedo catheter, placed in January 2022.      No blood thinner.  Has had a clot on the line before when it was removed.      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     Complication of anesthesia      Difficulty swallowing      Gastric ulcer, unspecified as acute or chronic, without mention of hemorrhage, perforation, or obstruction      Gastro-oesophageal reflux disease      Head injury      Hiatal hernia      Other bladder disorder      Other chronic pain      PONV (postoperative nausea and vomiting)      Severe malnutrition (H)     TPN     Short gut syndrome      Tobacco abuse      Current Outpatient Medications   Medication     albuterol (PROAIR HFA/PROVENTIL HFA/VENTOLIN HFA) 108 (90 Base) MCG/ACT inhaler     amphetamine-dextroamphetamine (ADDERALL) 20 MG tablet     carvedilol (COREG) 6.25 MG tablet     cyanocobalamin (CYANOCOBALAMIN) 1000 MCG/ML injection     [facility] PATIENT     fentaNYL (DURAGESIC) 25 mcg/hr 72 hr patch     insulin syringe-needle U-100 (29G X 1/2\" 1 ML) 29G X 1/2\" 1 ML miscellaneous     lactated ringers infusion     lidocaine (LIDODERM) 5 % patch     LORazepam (ATIVAN) 1 MG tablet     nystatin (MYCOSTATIN) 497203 UNIT/GM external cream     ondansetron (ZOFRAN-ODT) 8 MG ODT tab     oxyCODONE (ROXICODONE) 5 MG/5ML solution     pantoprazole (PROTONIX) 40 MG EC tablet     parenteral nutrition - PTA/DISCHARGE ORDER     polyethylene glycol (MIRALAX) 17 GM/Dose powder     sucralfate (CARAFATE) 1 GM/10ML suspension     VENTOLIN  (90 Base) MCG/ACT inhaler     vitamin D2 (ERGOCALCIFEROL) 73764 units (1250 mcg) " "capsule     acetaminophen (TYLENOL) 32 mg/mL liquid     diphenhydrAMINE (BENADRYL) 12.5 MG/5ML syrup     EPINEPHrine (ANY BX GENERIC EQUIV) 0.3 MG/0.3ML injection 2-pack     naloxone (NARCAN) 4 MG/0.1ML nasal spray     No current facility-administered medications for this visit.       Social History     Tobacco Use     Smoking status: Light Tobacco Smoker     Packs/day: 0.10     Years: 3.00     Pack years: 0.30     Types: Cigarettes     Smokeless tobacco: Never Used     Tobacco comment: 2/4/2021    smokes 3 cigarettes/day   Vaping Use     Vaping Use: Never used   Substance Use Topics     Alcohol use: No     Comment: quit 2002     Drug use: No             Review of Systems         Objective    /86 (BP Location: Left arm, Patient Position: Sitting, Cuff Size: Adult Regular)   Pulse 88   Temp 98.6  F (37  C) (Temporal)   Resp 18   Ht 1.803 m (5' 11\")   Wt 86.2 kg (190 lb 1.6 oz)   SpO2 98%   BMI 26.51 kg/m    Body mass index is 26.51 kg/m .  Physical Exam   NAD  Heart is regular  Lungs clear  Right neck and arm are swollen, mildly tender in the neck and axillae   No sign of infection.                 "

## 2022-02-24 ENCOUNTER — APPOINTMENT (OUTPATIENT)
Dept: GENERAL RADIOLOGY | Facility: CLINIC | Age: 52
DRG: 315 | End: 2022-02-24
Payer: COMMERCIAL

## 2022-02-24 ENCOUNTER — HOME INFUSION (PRE-WILLOW HOME INFUSION) (OUTPATIENT)
Dept: PHARMACY | Facility: CLINIC | Age: 52
End: 2022-02-24

## 2022-02-24 LAB
APTT PPP: 39 SECONDS (ref 22–38)
APTT PPP: 46 SECONDS (ref 22–38)
APTT PPP: 82 SECONDS (ref 22–38)
ATRIAL RATE - MUSE: 55 BPM
DIASTOLIC BLOOD PRESSURE - MUSE: NORMAL MMHG
GLUCOSE BLDC GLUCOMTR-MCNC: 96 MG/DL (ref 70–99)
HOLD SPECIMEN: NORMAL
HOLD SPECIMEN: NORMAL
INTERPRETATION ECG - MUSE: NORMAL
LACTATE SERPL-SCNC: 1 MMOL/L (ref 0.7–2)
P AXIS - MUSE: 40 DEGREES
PR INTERVAL - MUSE: 140 MS
QRS DURATION - MUSE: 80 MS
QT - MUSE: 434 MS
QTC - MUSE: 415 MS
R AXIS - MUSE: 21 DEGREES
SARS-COV-2 RNA RESP QL NAA+PROBE: NEGATIVE
SYSTOLIC BLOOD PRESSURE - MUSE: NORMAL MMHG
T AXIS - MUSE: 17 DEGREES
VENTRICULAR RATE- MUSE: 55 BPM

## 2022-02-24 PROCEDURE — 999N000128 HC STATISTIC PERIPHERAL IV START W/O US GUIDANCE

## 2022-02-24 PROCEDURE — 36415 COLL VENOUS BLD VENIPUNCTURE: CPT | Performed by: NURSE PRACTITIONER

## 2022-02-24 PROCEDURE — 74018 RADEX ABDOMEN 1 VIEW: CPT | Mod: 26 | Performed by: RADIOLOGY

## 2022-02-24 PROCEDURE — 85730 THROMBOPLASTIN TIME PARTIAL: CPT | Performed by: NURSE PRACTITIONER

## 2022-02-24 PROCEDURE — 250N000013 HC RX MED GY IP 250 OP 250 PS 637: Performed by: STUDENT IN AN ORGANIZED HEALTH CARE EDUCATION/TRAINING PROGRAM

## 2022-02-24 PROCEDURE — 250N000011 HC RX IP 250 OP 636

## 2022-02-24 PROCEDURE — 99223 1ST HOSP IP/OBS HIGH 75: CPT | Mod: AI | Performed by: STUDENT IN AN ORGANIZED HEALTH CARE EDUCATION/TRAINING PROGRAM

## 2022-02-24 PROCEDURE — 36415 COLL VENOUS BLD VENIPUNCTURE: CPT | Performed by: EMERGENCY MEDICINE

## 2022-02-24 PROCEDURE — 250N000013 HC RX MED GY IP 250 OP 250 PS 637

## 2022-02-24 PROCEDURE — U0005 INFEC AGEN DETEC AMPLI PROBE: HCPCS | Performed by: EMERGENCY MEDICINE

## 2022-02-24 PROCEDURE — 258N000003 HC RX IP 258 OP 636: Performed by: EMERGENCY MEDICINE

## 2022-02-24 PROCEDURE — 250N000011 HC RX IP 250 OP 636: Performed by: EMERGENCY MEDICINE

## 2022-02-24 PROCEDURE — 120N000002 HC R&B MED SURG/OB UMMC

## 2022-02-24 PROCEDURE — 3E0436Z INTRODUCTION OF NUTRITIONAL SUBSTANCE INTO CENTRAL VEIN, PERCUTANEOUS APPROACH: ICD-10-PCS | Performed by: STUDENT IN AN ORGANIZED HEALTH CARE EDUCATION/TRAINING PROGRAM

## 2022-02-24 PROCEDURE — C9803 HOPD COVID-19 SPEC COLLECT: HCPCS | Performed by: EMERGENCY MEDICINE

## 2022-02-24 PROCEDURE — 93005 ELECTROCARDIOGRAM TRACING: CPT | Performed by: EMERGENCY MEDICINE

## 2022-02-24 PROCEDURE — 85730 THROMBOPLASTIN TIME PARTIAL: CPT | Performed by: EMERGENCY MEDICINE

## 2022-02-24 PROCEDURE — 250N000009 HC RX 250: Performed by: STUDENT IN AN ORGANIZED HEALTH CARE EDUCATION/TRAINING PROGRAM

## 2022-02-24 PROCEDURE — 83605 ASSAY OF LACTIC ACID: CPT | Performed by: EMERGENCY MEDICINE

## 2022-02-24 PROCEDURE — 74018 RADEX ABDOMEN 1 VIEW: CPT

## 2022-02-24 PROCEDURE — 250N000011 HC RX IP 250 OP 636: Performed by: STUDENT IN AN ORGANIZED HEALTH CARE EDUCATION/TRAINING PROGRAM

## 2022-02-24 PROCEDURE — 87040 BLOOD CULTURE FOR BACTERIA: CPT | Performed by: EMERGENCY MEDICINE

## 2022-02-24 RX ORDER — LORAZEPAM 1 MG/1
1 TABLET ORAL DAILY PRN
Status: DISCONTINUED | OUTPATIENT
Start: 2022-02-24 | End: 2022-02-28 | Stop reason: HOSPADM

## 2022-02-24 RX ORDER — POLYETHYLENE GLYCOL 3350 17 G/17G
17 POWDER, FOR SOLUTION ORAL DAILY
Status: DISCONTINUED | OUTPATIENT
Start: 2022-02-24 | End: 2022-02-28 | Stop reason: HOSPADM

## 2022-02-24 RX ORDER — ONDANSETRON 4 MG/1
4 TABLET, ORALLY DISINTEGRATING ORAL EVERY 6 HOURS PRN
Status: DISCONTINUED | OUTPATIENT
Start: 2022-02-24 | End: 2022-02-24

## 2022-02-24 RX ORDER — LORAZEPAM 2 MG/ML
1 INJECTION INTRAMUSCULAR ONCE
Status: COMPLETED | OUTPATIENT
Start: 2022-02-24 | End: 2022-02-24

## 2022-02-24 RX ORDER — NYSTATIN 100000 U/G
CREAM TOPICAL 2 TIMES DAILY PRN
Status: DISCONTINUED | OUTPATIENT
Start: 2022-02-24 | End: 2022-02-28 | Stop reason: HOSPADM

## 2022-02-24 RX ORDER — LIDOCAINE 4 G/G
2 PATCH TOPICAL AT BEDTIME
Status: DISCONTINUED | OUTPATIENT
Start: 2022-02-24 | End: 2022-02-28 | Stop reason: HOSPADM

## 2022-02-24 RX ORDER — SUCRALFATE ORAL 1 G/10ML
1 SUSPENSION ORAL 4 TIMES DAILY PRN
Status: DISCONTINUED | OUTPATIENT
Start: 2022-02-24 | End: 2022-02-28 | Stop reason: HOSPADM

## 2022-02-24 RX ORDER — ONDANSETRON 4 MG/1
8 TABLET, ORALLY DISINTEGRATING ORAL EVERY 6 HOURS PRN
Status: DISCONTINUED | OUTPATIENT
Start: 2022-02-24 | End: 2022-02-28 | Stop reason: HOSPADM

## 2022-02-24 RX ORDER — ALBUTEROL SULFATE 90 UG/1
2 AEROSOL, METERED RESPIRATORY (INHALATION) EVERY 6 HOURS PRN
Status: DISCONTINUED | OUTPATIENT
Start: 2022-02-24 | End: 2022-02-28 | Stop reason: HOSPADM

## 2022-02-24 RX ORDER — ONDANSETRON 2 MG/ML
4 INJECTION INTRAMUSCULAR; INTRAVENOUS
Status: DISCONTINUED | OUTPATIENT
Start: 2022-02-24 | End: 2022-02-28 | Stop reason: HOSPADM

## 2022-02-24 RX ORDER — PROCHLORPERAZINE 25 MG
25 SUPPOSITORY, RECTAL RECTAL EVERY 12 HOURS PRN
Status: DISCONTINUED | OUTPATIENT
Start: 2022-02-24 | End: 2022-02-28 | Stop reason: HOSPADM

## 2022-02-24 RX ORDER — ONDANSETRON 2 MG/ML
4 INJECTION INTRAMUSCULAR; INTRAVENOUS ONCE
Status: COMPLETED | OUTPATIENT
Start: 2022-02-24 | End: 2022-02-24

## 2022-02-24 RX ORDER — LIDOCAINE 40 MG/G
CREAM TOPICAL
Status: DISCONTINUED | OUTPATIENT
Start: 2022-02-24 | End: 2022-02-28 | Stop reason: HOSPADM

## 2022-02-24 RX ORDER — DEXTROAMPHETAMINE SACCHARATE, AMPHETAMINE ASPARTATE, DEXTROAMPHETAMINE SULFATE AND AMPHETAMINE SULFATE 2.5; 2.5; 2.5; 2.5 MG/1; MG/1; MG/1; MG/1
20 TABLET ORAL DAILY
Status: DISCONTINUED | OUTPATIENT
Start: 2022-02-24 | End: 2022-02-28 | Stop reason: HOSPADM

## 2022-02-24 RX ORDER — ACETAMINOPHEN 325 MG/10.15ML
650 LIQUID ORAL EVERY 6 HOURS PRN
Status: DISCONTINUED | OUTPATIENT
Start: 2022-02-24 | End: 2022-02-28 | Stop reason: HOSPADM

## 2022-02-24 RX ORDER — OXYCODONE HCL 5 MG/5 ML
5-10 SOLUTION, ORAL ORAL EVERY 6 HOURS PRN
Status: DISCONTINUED | OUTPATIENT
Start: 2022-02-24 | End: 2022-02-28 | Stop reason: HOSPADM

## 2022-02-24 RX ORDER — FENTANYL 25 UG/1
25 PATCH TRANSDERMAL
Status: DISCONTINUED | OUTPATIENT
Start: 2022-02-24 | End: 2022-02-24

## 2022-02-24 RX ORDER — ACETAMINOPHEN 325 MG/1
650 TABLET ORAL EVERY 6 HOURS PRN
Status: DISCONTINUED | OUTPATIENT
Start: 2022-02-24 | End: 2022-02-24

## 2022-02-24 RX ORDER — CARVEDILOL 6.25 MG/1
6.25 TABLET ORAL 2 TIMES DAILY WITH MEALS
Status: DISCONTINUED | OUTPATIENT
Start: 2022-02-24 | End: 2022-02-24

## 2022-02-24 RX ORDER — DEXTROSE MONOHYDRATE 100 MG/ML
INJECTION, SOLUTION INTRAVENOUS CONTINUOUS PRN
Status: DISCONTINUED | OUTPATIENT
Start: 2022-02-24 | End: 2022-02-28 | Stop reason: HOSPADM

## 2022-02-24 RX ORDER — ONDANSETRON 2 MG/ML
4 INJECTION INTRAMUSCULAR; INTRAVENOUS EVERY 6 HOURS PRN
Status: DISCONTINUED | OUTPATIENT
Start: 2022-02-24 | End: 2022-02-24

## 2022-02-24 RX ORDER — PROCHLORPERAZINE MALEATE 5 MG
10 TABLET ORAL EVERY 6 HOURS PRN
Status: DISCONTINUED | OUTPATIENT
Start: 2022-02-24 | End: 2022-02-28 | Stop reason: HOSPADM

## 2022-02-24 RX ORDER — HYDROMORPHONE HYDROCHLORIDE 1 MG/ML
0.5 INJECTION, SOLUTION INTRAMUSCULAR; INTRAVENOUS; SUBCUTANEOUS EVERY 30 MIN PRN
Status: DISCONTINUED | OUTPATIENT
Start: 2022-02-24 | End: 2022-02-24

## 2022-02-24 RX ORDER — CARVEDILOL 12.5 MG/1
12.5 TABLET ORAL 2 TIMES DAILY WITH MEALS
Status: DISCONTINUED | OUTPATIENT
Start: 2022-02-24 | End: 2022-02-28 | Stop reason: HOSPADM

## 2022-02-24 RX ORDER — POLYETHYLENE GLYCOL 3350 17 G/17G
17 POWDER, FOR SOLUTION ORAL DAILY PRN
Status: DISCONTINUED | OUTPATIENT
Start: 2022-02-24 | End: 2022-02-28 | Stop reason: HOSPADM

## 2022-02-24 RX ORDER — ONDANSETRON 2 MG/ML
8 INJECTION INTRAMUSCULAR; INTRAVENOUS EVERY 6 HOURS PRN
Status: DISCONTINUED | OUTPATIENT
Start: 2022-02-24 | End: 2022-02-28 | Stop reason: HOSPADM

## 2022-02-24 RX ORDER — PANTOPRAZOLE SODIUM 40 MG/1
40 TABLET, DELAYED RELEASE ORAL DAILY
Status: DISCONTINUED | OUTPATIENT
Start: 2022-02-24 | End: 2022-02-28 | Stop reason: HOSPADM

## 2022-02-24 RX ORDER — FENTANYL 25 UG/1
25 PATCH TRANSDERMAL
Status: DISCONTINUED | OUTPATIENT
Start: 2022-02-25 | End: 2022-02-28 | Stop reason: HOSPADM

## 2022-02-24 RX ADMIN — HEPARIN SODIUM 1800 UNITS/HR: 1000 INJECTION INTRAVENOUS; SUBCUTANEOUS at 07:36

## 2022-02-24 RX ADMIN — ONDANSETRON 4 MG: 4 TABLET, ORALLY DISINTEGRATING ORAL at 08:09

## 2022-02-24 RX ADMIN — LORAZEPAM 1 MG: 2 INJECTION INTRAMUSCULAR; INTRAVENOUS at 02:50

## 2022-02-24 RX ADMIN — DEXTROAMPHETAMINE SACCHARATE, AMPHETAMINE ASPARTATE, DEXTROAMPHETAMINE SULFATE AND AMPHETAMINE SULFATE 20 MG: 2.5; 2.5; 2.5; 2.5 TABLET ORAL at 10:49

## 2022-02-24 RX ADMIN — HYDROMORPHONE HYDROCHLORIDE 0.5 MG: 1 INJECTION, SOLUTION INTRAMUSCULAR; INTRAVENOUS; SUBCUTANEOUS at 01:04

## 2022-02-24 RX ADMIN — ONDANSETRON 8 MG: 4 TABLET, ORALLY DISINTEGRATING ORAL at 22:24

## 2022-02-24 RX ADMIN — PROCHLORPERAZINE EDISYLATE 10 MG: 5 INJECTION INTRAMUSCULAR; INTRAVENOUS at 12:27

## 2022-02-24 RX ADMIN — OXYCODONE HYDROCHLORIDE 10 MG: 5 SOLUTION ORAL at 10:36

## 2022-02-24 RX ADMIN — SODIUM CHLORIDE 1000 ML: 9 INJECTION, SOLUTION INTRAVENOUS at 00:15

## 2022-02-24 RX ADMIN — ONDANSETRON 8 MG: 2 INJECTION INTRAMUSCULAR; INTRAVENOUS at 16:30

## 2022-02-24 RX ADMIN — BENZOCAINE AND MENTHOL 1 LOZENGE: 15; 2.6 LOZENGE ORAL at 19:42

## 2022-02-24 RX ADMIN — PROCHLORPERAZINE EDISYLATE 10 MG: 5 INJECTION INTRAMUSCULAR; INTRAVENOUS at 19:43

## 2022-02-24 RX ADMIN — HYDROMORPHONE HYDROCHLORIDE 0.5 MG: 1 INJECTION, SOLUTION INTRAMUSCULAR; INTRAVENOUS; SUBCUTANEOUS at 02:27

## 2022-02-24 RX ADMIN — HEPARIN SODIUM 1500 UNITS/HR: 1000 INJECTION INTRAVENOUS; SUBCUTANEOUS at 00:08

## 2022-02-24 RX ADMIN — OXYCODONE HYDROCHLORIDE 10 MG: 5 SOLUTION ORAL at 16:27

## 2022-02-24 RX ADMIN — OXYCODONE HYDROCHLORIDE 10 MG: 5 SOLUTION ORAL at 22:24

## 2022-02-24 RX ADMIN — FENTANYL 1 PATCH: 25 PATCH, EXTENDED RELEASE TRANSDERMAL at 13:40

## 2022-02-24 RX ADMIN — OXYCODONE HYDROCHLORIDE 10 MG: 5 SOLUTION ORAL at 05:04

## 2022-02-24 RX ADMIN — MAGNESIUM SULFATE HEPTAHYDRATE: 500 INJECTION, SOLUTION INTRAMUSCULAR; INTRAVENOUS at 20:48

## 2022-02-24 RX ADMIN — PANTOPRAZOLE SODIUM 40 MG: 40 TABLET, DELAYED RELEASE ORAL at 08:03

## 2022-02-24 RX ADMIN — CARVEDILOL 12.5 MG: 12.5 TABLET, FILM COATED ORAL at 17:55

## 2022-02-24 RX ADMIN — CARVEDILOL 6.25 MG: 6.25 TABLET, FILM COATED ORAL at 08:03

## 2022-02-24 RX ADMIN — HEPARIN SODIUM 2200 UNITS/HR: 1000 INJECTION INTRAVENOUS; SUBCUTANEOUS at 16:26

## 2022-02-24 RX ADMIN — ONDANSETRON 4 MG: 2 INJECTION INTRAMUSCULAR; INTRAVENOUS at 01:04

## 2022-02-24 RX ADMIN — ACETAMINOPHEN 650 MG: 325 SOLUTION ORAL at 20:46

## 2022-02-24 RX ADMIN — HYDROMORPHONE HYDROCHLORIDE 0.5 MG: 1 INJECTION, SOLUTION INTRAMUSCULAR; INTRAVENOUS; SUBCUTANEOUS at 01:48

## 2022-02-24 RX ADMIN — NYSTATIN: 100000 CREAM TOPICAL at 20:46

## 2022-02-24 ASSESSMENT — ACTIVITIES OF DAILY LIVING (ADL)
ADLS_ACUITY_SCORE: 9
TOILETING_ISSUES: NO
ADLS_ACUITY_SCORE: 9
EATING: 0-->INDEPENDENT
ADLS_ACUITY_SCORE: 9
CHANGE_IN_FUNCTIONAL_STATUS_SINCE_ONSET_OF_CURRENT_ILLNESS/INJURY: NO
ADLS_ACUITY_SCORE: 5
ADLS_ACUITY_SCORE: 5
ADLS_ACUITY_SCORE: 9
SWALLOWING: 0-->SWALLOWS FOODS/LIQUIDS WITHOUT DIFFICULTY (DEVELOPMENTALLY APPROPRIATE)
ADLS_ACUITY_SCORE: 9
DOING_ERRANDS_INDEPENDENTLY_DIFFICULTY: NO
ADLS_ACUITY_SCORE: 9
ADLS_ACUITY_SCORE: 9
CONCENTRATING,_REMEMBERING_OR_MAKING_DECISIONS_DIFFICULTY: NO
ADLS_ACUITY_SCORE: 5
WEAR_GLASSES_OR_BLIND: NO
ADLS_ACUITY_SCORE: 9
ADLS_ACUITY_SCORE: 9
FALL_HISTORY_WITHIN_LAST_SIX_MONTHS: NO
SWALLOWING: 0-->SWALLOWS FOODS/LIQUIDS WITHOUT DIFFICULTY
DIFFICULTY_EATING/SWALLOWING: NO
ADLS_ACUITY_SCORE: 9
ADLS_ACUITY_SCORE: 9
DRESSING/BATHING_DIFFICULTY: NO
ADLS_ACUITY_SCORE: 9
ADLS_ACUITY_SCORE: 9
WALKING_OR_CLIMBING_STAIRS_DIFFICULTY: NO
ADLS_ACUITY_SCORE: 9
EATING: 0-->INDEPENDENT
ADLS_ACUITY_SCORE: 9

## 2022-02-24 NOTE — ED TRIAGE NOTES
Pt. Sent to ED from clinic, arrived by private vehicle, for further evaluation of extensive thrombus. Pt. Reports chest and neck pain.

## 2022-02-24 NOTE — CONSULTS
"    Interventional Radiology Consult Service Note    Consult Request: Evaluate tunneled CVC DVT    History: Parker is a 51 year old male with history of gm-en-y bypass, esophagojejunostomy c/b short gut, severe malnutrition on TPN, HTN, recurrent bacteremia/line infections. He presented to the ED on 2/24/22 with right arm pain, swelling, and discoloration. RUE US showed extensive deep venous thrombosis of the right upper extremity extending from the axillary to internal jugular vein and extending into he deep venous mediastinum. Patient has been seen in IR several time in the past for line changes due to bacteremia. His line is currently functional and needed for TPN.     Imaging Reviewed: RUE duplex US 2/23/22    Vitals:   /84   Pulse 65   Temp (!) 96.5  F (35.8  C) (Oral)   Resp 20   Ht 1.803 m (5' 11\")   Wt 83.9 kg (185 lb)   SpO2 96%   BMI 25.80 kg/m      Pertinent Labs:     Lab Results   Component Value Date    WBC 14.2 (H) 02/23/2022    WBC 10.4 02/23/2022    WBC 5.5 02/15/2022    WBC 6.9 06/29/2021    WBC 8.1 06/14/2021    WBC 7.5 06/09/2021       Lab Results   Component Value Date    HGB 13.1 02/23/2022    HGB 13.7 02/23/2022    HGB 10.5 02/15/2022    HGB 12.1 06/29/2021    HGB 12.0 06/14/2021    HGB 13.3 06/09/2021       Lab Results   Component Value Date     02/23/2022     02/23/2022     02/15/2022     06/29/2021     06/14/2021     06/09/2021       Lab Results   Component Value Date    INR 1.05 02/23/2022    INR 1.07 05/07/2021    PTT 46 (H) 02/24/2022    PTT 26 07/22/2019       Plan:    Plan discussed between requesting team and IR fellow Dr. Walker overnight. Plan to admit patient and start patient anticoagulation. Recommend serial US imaging tomorrow to assess clot burden. He is now on heparin ggt. Should try to preserve access if possible due to long term catheter needs.    Should symptoms worsen or patient appears infected please contact IR if " further concerns.    Reviewed plan with IR staff Dr. Henry.    Linda Queen PA-C  Interventional Radiology  Pager: [phone]

## 2022-02-24 NOTE — H&P
"  Internal Medicine History and Physical  Parker Acevedo MRN: [id number]  1970  Date of Admission:2/23/2022  Primary care provider: Chuy Isaac  ___________________________________     Chief Complaint     R arm pain     History of Present Illness   Parker Acevedo is a 51 year old PMH gm-en-y bypass, esophagojejunostomy c/b short gut, severe malnutrition on TPN, HTN, recurrent bacteremia/line infections presenting to ED for R arm pain and blood clot.     Pt was in his USOH until 2 day prior to admit when he began experiencing R arm pain and swelling. The pain and swelling became increasingly worse over time. On morning of presentation, noted significant R arm swelling, discoloration of arm (bluish-reddish), R neck swelling, R sided headache, and pain around his R picc line. He found it difficult to close his hand due to increased size. Described pain as a pressure, no numbess, tingling, burning.  He saw his outpatient PCP who got a RUE ultrasound which revealed an \"extensive deep venous thrombosis of the right upper extremity extending from the proximal aspect of the axillary and cephalic veins into the subclavian vein and up the internal jugular vein as high as  the internal jugular vein could be seen in the neck; also extended into the deep venous structures of the mediastinum, where seen.\" PCP discussed with IR at the  who recommended inpatient treatment with IV heparin and serial RUE ultrasounds so he presented to the ED for further management.    On ROS, notes increased vomiting and nausea. He has vomiting at baseline and takes zofran at home but has been having more frequent episodes today. Unable to keep PO intake down, though this is not entirely a change from baseline. He notes worse abdominal cramping compared to usual. Feels like he has been sweating. No SOB but feels some throat discomfort on R.     He denies fevers, chills, chest pain, shortness of breath, cough, lower extremity " swelling. No dysphagia. He is uncomfortable and has overall malaise and does not feel well.     Complete 10 point review of systems negative unless noted in HPI.     Past Medical History     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     Complication of anesthesia      Difficulty swallowing      Gastric ulcer, unspecified as acute or chronic, without mention of hemorrhage, perforation, or obstruction      Gastro-oesophageal reflux disease      Head injury      Hiatal hernia      Other bladder disorder      Other chronic pain      PONV (postoperative nausea and vomiting)      Severe malnutrition (H)     TPN     Short gut syndrome      Tobacco abuse        Past Surgical History:    Past Surgical History:   Procedure Laterality Date     AMPUTATION       APPENDECTOMY       BACK SURGERY  11/3/2014    curve in the spine     BIOPSY LYMPH NODE CERVICAL N/A 2/20/2015    Procedure: BIOPSY LYMPH NODE CERVICAL;  Surgeon: Baron Scanlon MD;  [facility]     CHOLECYSTECTOMY       COLONOSCOPY N/A 7/14/2021    Procedure: COLONOSCOPY;  Surgeon: Jimbo Estrada MD;  Location: [facility] OR     DISCECTOMY, FUSION CERVICAL ANTERIOR ONE LEVEL, COMBINED N/A 2/15/2017    Procedure: COMBINED DISCECTOMY, FUSION CERVICAL ANTERIOR ONE LEVEL;  Surgeon: Darren Campos MD;  [facility]     ENDOSCOPIC INSERTION TUBE GASTROSTOMY  9/9/2013    Procedure: ENDOSCOPIC INSERTION TUBE GASTROSTOMY;;  Surgeon: Francis Vyas MD;  [facility]     ENDOSCOPIC ULTRASOUND UPPER GASTROINTESTINAL TRACT (GI)  4/29/2011    Procedure:ENDOSCOPIC ULTRASOUND UPPER GASTROINTESTINAL TRACT (GI); Both Procedures done Conjointly; Surgeon:NEREIDA HOUSER; [facility]      ENDOSCOPIC ULTRASOUND UPPER GASTROINTESTINAL TRACT (GI)  9/9/2013    Procedure: ENDOSCOPIC ULTRASOUND UPPER GASTROINTESTINAL TRACT (GI);  Endoscopic Ultrasound Guide Gastrostomy Tube Placement  C-arm;  Surgeon: Noe Lizarraga MD;  [facility]     ENDOSCOPIC ULTRASOUND UPPER GASTROINTESTINAL TRACT (GI) N/A 2/24/2021    Procedure: ENDOSCOPIC ULTRASOUND, ESOPHAGOSCOPY / UPPER GASTROINTESTINAL TRACT (GI), esophagastrogastroduodenoscopy;  Surgeon: Berny Bach MD;  [facility]     ENDOSCOPY  03/25/11    EGD, [facility]     ENDOSCOPY  08/04/09    Upper Endoscopy, [facility]     ENDOSCOPY  01/05/09    Upper Endoscopy, [facility]     ESOPHAGOSCOPY, GASTROSCOPY, DUODENOSCOPY (EGD), COMBINED  4/20/2011    Procedure:COMBINED ESOPHAGOSCOPY, GASTROSCOPY, DUODENOSCOPY (EGD); Surgeon:BLU VYAS; [facility]     ESOPHAGOSCOPY, GASTROSCOPY, DUODENOSCOPY (EGD), COMBINED  6/15/2011    Procedure:COMBINED ESOPHAGOSCOPY, GASTROSCOPY, DUODENOSCOPY (EGD); Surgeon:BLU VYAS; [facility]     ESOPHAGOSCOPY, GASTROSCOPY, DUODENOSCOPY (EGD), COMBINED  6/12/2013    Procedure: COMBINED ESOPHAGOSCOPY, GASTROSCOPY, DUODENOSCOPY (EGD);;  Surgeon: Blu Vyas MD;  [facility]     ESOPHAGOSCOPY, GASTROSCOPY, DUODENOSCOPY (EGD), COMBINED  11/22/2013    Procedure: COMBINED ESOPHAGOSCOPY, GASTROSCOPY, DUODENOSCOPY (EGD);;  Surgeon: Blu Vyas MD;  [facility]     ESOPHAGOSCOPY, GASTROSCOPY, DUODENOSCOPY (EGD), COMBINED  4/30/2014    Procedure: COMBINED ESOPHAGOSCOPY, GASTROSCOPY, DUODENOSCOPY (EGD);  Surgeon: Blu Vyas MD;  [facility]     ESOPHAGOSCOPY, GASTROSCOPY, DUODENOSCOPY (EGD), COMBINED N/A 2/20/2015    Procedure: COMBINED ESOPHAGOSCOPY, GASTROSCOPY, DUODENOSCOPY (EGD), BIOPSY SINGLE OR MULTIPLE;  Surgeon: Baron Scanlon MD;  [facility]     ESOPHAGOSCOPY, GASTROSCOPY, DUODENOSCOPY (EGD), COMBINED N/A 9/30/2015     Procedure: COMBINED ESOPHAGOSCOPY, GASTROSCOPY, DUODENOSCOPY (EGD);  Surgeon: Francis Vyas MD;  Location:  GI     ESOPHAGOSCOPY, GASTROSCOPY, DUODENOSCOPY (EGD), COMBINED N/A 10/3/2019    Procedure: Upper Endoscopy;  Surgeon: Clif Morrow MD;  [facility]     GASTRECTOMY  6/22/2012    Procedure: GASTRECTOMY;  Open Approach, Excise Ulcers,Partial Gastrectomy, Esophagojejunostomy, Hiatal Hernia Repair, Extensive Lysis of Adhesions and Esaphagogastrodudenoscopy.;  Surgeon: Francis Vyas MD;  [facility]     GASTROJEJUNOSTOMY  08/26/09    Extensice enterolysis, partial resect. jejunum, part. resect gastric pouch, gastrojejunostomy anastomosis     HC ESOPH/GAS REFLUX TEST W NASAL IMPED ELECTRODE  8/5/2013    Procedure: ESOPHAGEAL IMPEDENCE FUNCTION TEST 1 HOUR OR LESS;  Surgeon: Halie Lang MD;  Location:  GI     HEAD & NECK SURGERY  2/15/2017    C5-C6     HERNIA REPAIR  2006    Umbilical hernia     HERNIORRHAPHY HIATAL  6/22/2012    Procedure: HERNIORRHAPHY HIATAL;;  Surgeon: Francis Vyas MD;  [facility]     HERNIORRHAPHY INGUINAL  11/22/2013    Procedure: HERNIORRHAPHY INGUINAL;;  Surgeon: Francis Vyas MD;  [facility]     INSERT PICC LINE Right 12/19/2019    Procedure: Picc Placement;  Surgeon: Per Dumont PA-C;  [facility]     INSERT PICC LINE Right 2/21/2020    Procedure: INSERTION, PICC;  Surgeon: Per Dumont PA-C;  [facility]     INSERT PORT VASCULAR ACCESS Right 12/19/2017    Procedure: INSERT PORT VASCULAR ACCESS;  Right Chest Port Placement ;  Surgeon: Lisandro Alejandro PA-C;  [facility]     INSERT PORT VASCULAR ACCESS Right 8/2/2018    Procedure: INSERT PORT VASCULAR ACCESS;  Place single lumen tunneled central venous access catheter;  Surgeon: Guy Jamil PA-C;  [facility]     IR CVC TUNNEL PLACEMENT > 5 YRS OF AGE  8/7/2019     IR CVC TUNNEL PLACEMENT > 5 YRS OF AGE  4/14/2020     IR  CVC TUNNEL PLACEMENT > 5 YRS OF AGE  8/3/2020     IR CVC TUNNEL PLACEMENT > 5 YRS OF AGE  9/4/2020     IR CVC TUNNEL PLACEMENT > 5 YRS OF AGE  2/5/2021     IR CVC TUNNEL PLACEMENT > 5 YRS OF AGE  3/23/2021     IR CVC TUNNEL PLACEMENT > 5 YRS OF AGE  1/13/2022     IR CVC TUNNEL REMOVAL RIGHT  10/1/2019     IR CVC TUNNEL REMOVAL RIGHT  7/30/2020     IR CVC TUNNEL REMOVAL RIGHT  9/2/2020     IR CVC TUNNEL REMOVAL RIGHT  2/3/2021     IR CVC TUNNEL REMOVAL RIGHT  3/19/2021     IR CVC TUNNEL REMOVAL RIGHT  1/10/2022     IR CVC TUNNEL REVISION RIGHT  5/7/2021     IR FOLLOW UP VISIT OUTPATIENT  8/7/2019     IR PICC PLACEMENT > 5 YRS OF AGE  3/7/2019     IR PICC PLACEMENT > 5 YRS OF AGE  12/19/2019     IR PICC PLACEMENT > 5 YRS OF AGE  2/21/2020     LAPAROTOMY EXPLORATORY  11/22/2013    Procedure: LAPAROTOMY EXPLORATORY;  Exploratory Laparotomy, Upper Endoscopy, Left Inguinal Hernia Repair;  Surgeon: Francis Vyas MD;  [facility]     ORTHOPEDIC SURGERY       PICC INSERTION Right 03/16/2017    5fr DL BioFlo PICC, 42cm (3cm external) in the R medial brachial vein w/ tip in the SVC RA junction.     PICC INSERTION Left 09/23/2017    5fr DL BioFlo PICC, 45cm (1cm external) in the L basilic vein w/ tip in the SVC RA junction.     PICC INSERTION Right 05/16/2019    5Fr - 43cm, Medial brachial vein, low SVC     PICC INSERTION Right 10/02/2019    5Fr - 43cm (2cm external), basilic vein, low SVC     SHAYLEE EN Y BOWEL  2003     SOFT TISSUE SURGERY       THORACIC SURGERY       TONSILLECTOMY       TRANSESOPHAGEAL ECHOCARDIOGRAM INTRAOPERATIVE N/A 1/8/2019    Procedure: TRANSESOPHAGEAL ECHOCARDIOGRAM INTRAOPERATIVE;  Surgeon: GENERIC ANESTHESIA PROVIDER;  [facility]     ZC GASTRIC BYPASS,OBESE<100CM SHAYLEE-EN-Y  2002    lost 300 pounds       Social History:     Social History     Tobacco Use     Smoking status: Light Tobacco Smoker     Packs/day: 0.10     Years: 3.00     Pack years: 0.30     Types: Cigarettes     Smokeless  tobacco: Never Used     Tobacco comment: 2/4/2021    smokes 3 cigarettes/day   Vaping Use     Vaping Use: Never used   Substance Use Topics     Alcohol use: No     Comment: quit 2002     Drug use: No       Family History:    Family History   Problem Relation Age of Onset     Gastrointestinal Disease Mother         Crohns disease     Anxiety Disorder Mother      Thyroid Disease Mother         Grave's disease     Cancer Father         ear cancer-skin cancer/melanoma     Breast Cancer Maternal Grandmother      Macular Degeneration Maternal Grandfather      Anxiety Disorder Sister      Diabetes Maternal Uncle      Breast Cancer Other      Hypertension No family hx of      Hyperlipidemia No family hx of      Cerebrovascular Disease No family hx of      Prostate Cancer No family hx of      Depression No family hx of      Anesthesia Reaction No family hx of      Asthma No family hx of      Osteoporosis No family hx of      Genetic Disorder No family hx of      Obesity No family hx of      Mental Illness No family hx of      Substance Abuse No family hx of      Glaucoma No family hx of        Allergies:  Allergies   Allergen Reactions     Bactrim [Sulfamethoxazole W/Trimethoprim] Rash     Penicillins Anaphylaxis     Please see Antimicrobial Management Team allergy assessment note 10/10/2018. Patient reported tolerating amoxicillin.     Doxycycline Rash     Vancomycin Rash     Rash after receiving vancomycin 3/28/16 (infusion reaction?). Tolerated with slower infusion and diphenhydramine premed.       Medications:  No current facility-administered medications on file prior to encounter.  acetaminophen (TYLENOL) 32 mg/mL liquid, Take 15.65 mLs (500 mg) by mouth every 4 hours as needed for fever or mild pain (Patient not taking: Reported on 2/23/2022)  albuterol (PROAIR HFA/PROVENTIL HFA/VENTOLIN HFA) 108 (90 Base) MCG/ACT inhaler, Inhale 2 puffs into the lungs every 6 hours as needed  amphetamine-dextroamphetamine (ADDERALL)  "20 MG tablet, TAKE ONE TABLET BY MOUTH ONCE DAILY  Apixaban Starter Pack (ELIQUIS DVT/PE STARTER PACK) 5 MG TBPK, Take 10 mg by mouth 2 times daily for 7 days, THEN 5 mg 2 times daily for 23 days.  carvedilol (COREG) 6.25 MG tablet, Take 6.25 mg by mouth 2 times daily (with meals)  cyanocobalamin (CYANOCOBALAMIN) 1000 MCG/ML injection, INJECT 1 ML INTO THE MUSCLE EVERY 30 DAYS  diphenhydrAMINE (BENADRYL) 12.5 MG/5ML syrup, Take 25 mg by mouth every 4 hours as needed for itching, allergies or sleep (Patient not taking: Reported on 2/9/2022)  EPINEPHrine (ANY BX GENERIC EQUIV) 0.3 MG/0.3ML injection 2-pack,   [facility]NAGED PATIENT, Contact [facility] for patient specific medication information at [phone] on admission and discharge from the hospital.  Phones are answered 24 hours a day 7 days a week 365 days a year.    Providers - Choose \"CONTINUE HOME MED (no script)\" at discharge if patient treatment with home infusion will continue.  fentaNYL (DURAGESIC) 25 mcg/hr 72 hr patch, Place 1 patch onto the skin every 48 hours remove old patch. Fill 03/01/22 and start 03/03/22.  insulin syringe-needle U-100 (29G X 1/2\" 1 ML) 29G X 1/2\" 1 ML miscellaneous, Use to inject b12 every 30 days  lactated ringers infusion, Inject 1,000-2,000 mLs into the vein daily as needed  lidocaine (LIDODERM) 5 % patch, Place 1-2 patches onto the skin every 24 hours Wear for 12 hours, remove for 12 hours.  OK to cut to better fit to size.  LORazepam (ATIVAN) 1 MG tablet, TAKE 1 TABLET (1MG) BY MOUTH AS NEEDED FOR ANXIETY WITH TPN AND MEDICATIONS . JUST ONCE A DAY (30 TO LAST 30 DAYS)  naloxone (NARCAN) 4 MG/0.1ML nasal spray, Spray 1 spray (4 mg) into one nostril alternating nostrils once as needed for opioid reversal every 2-3 minutes until assistance arrives (Patient not taking: Reported on 2/9/2022)  nystatin (MYCOSTATIN) 251311 UNIT/GM external cream, APPLY TOPICALLY 2 TIMES DAILY  ondansetron (ZOFRAN-ODT) " 8 MG ODT tab, DISSOLVE ONE TABLET ON TONGUE EVERY 8 HOURS AS NEEDED FOR NAUSEA  oxyCODONE (ROXICODONE) 5 MG/5ML solution, Take 5-10 mLs (5-10 mg) by mouth 3 times daily as needed for pain Max of 30mg/day. Put at least 4 hours between doses. Fill 03/01/22 and start 03/03/22. 30 day supply.  pantoprazole (PROTONIX) 40 MG EC tablet, Take 1 tablet (40 mg) by mouth daily  parenteral nutrition - PTA/DISCHARGE ORDER, Patient receives 2050 mL TPN every 14 hours through PICC at [facility].  polyethylene glycol (MIRALAX) 17 GM/Dose powder, Take 17 g by mouth daily  sucralfate (CARAFATE) 1 GM/10ML suspension, TAKE 10MLS  BY MOUTH FOUR TIMES A DAY AS NEEDED  VENTOLIN  (90 Base) MCG/ACT inhaler, INHALE TWO PUFFS BY MOUTH EVERY 4 HOURS AS NEEDED FOR SHORTNESS OF BREATH /DYSPNEA OR WHEEZING  vitamin D2 (ERGOCALCIFEROL) 35983 units (1250 mcg) capsule, TAKE 1 CAPSULE (50,000 UNITS) BY MOUTH ONCE A WEEK  [DISCONTINUED] NO ACTIVE MEDICATIONS, .        (Not in a hospital admission)      Physical Exam     Temp:  [96.5  F (35.8  C)-98.6  F (37  C)] 96.5  F (35.8  C)  Pulse:  [72-97] 72  Resp:  [18-20] 20  BP: (110-128)/(67-91) 128/83  SpO2:  [96 %-100 %] 100 %    GENERAL APPEARANCE: Alert and cooperative, uncomfortable appearing.  HEENT: NCAT, MMM. R face is swollen as compared to L side of face. R neck swollen as compared to L neck.  CARDIAC: RRR. Normal S1, S2. No M/R/G.   LUNGS: comfortable conversational breathing on room air, declined full respiratory exam  EXTREMITIES: RUE swollen, darkened in color as compared to L. Swelling of entire right arm from fingers up through R neck.   ABDOMEN: + BS. Soft, nondistended, nontender. No guarding or rebound. No masses.  NEUROLOGICAL: AxO x3. CN II-XII grossly intact.   SKIN: Darkened/reddened skin of R arm as comapred to L . Tenderness to palpation around picc line  PSYCH: Normal Affect.     Diagnostic Studies:  Recent Labs   Lab 02/23/22  2207 02/23/22  1621   WBC 14.2*  10.4   HGB 13.1* 13.7   MCV 91 92    198   INR 1.05  --     140   POTASSIUM 3.5 4.0   CHLORIDE 108 109   CO2 23 29   BUN 14 12   CR 0.78 0.81   ANIONGAP 8 2*   SILAS 8.4* 8.9   GLC 59* 99   ALBUMIN  --  3.5   PROTTOTAL  --  7.4   BILITOTAL  --  0.7   ALKPHOS  --  85   ALT  --  44   AST  --  28       Recent Results (from the past 24 hour(s))   US Upper Extremity Venous Duplex Right    Narrative    ULTRASOUND RIGHT UPPER EXTREMITY VENOUS DUPLEX   2/23/2022 3:35 PM     HISTORY: Pain and swelling of right upper extremity.    COMPARISON: Venous Doppler ultrasound of the right upper extremity  dated 7/22/2019.    FINDINGS: Gray-scale, color and Doppler spectral analysis ultrasound  was performed of the right upper extremity. Compression and  augmentation imaging was performed where anatomically possible.    Extensive occlusive venous thrombosis is seen in the right subclavian,  proximal axillary vein and extending in the visualized portions of the  right internal jugular vein as well brachycephalic vein, where seen.  Otherwise, the deep venous system of the right upper extremity is  fully compressible where compressibility is anatomically possible.   Spectral Doppler demonstrates normal phasicity and excellent  augmentation with forearm compression.  Visualized cephalic and deep  forearm veins are patent.      Impression    IMPRESSION:   1. Extensive deep venous thrombosis of the right upper extremity  extending from the proximal aspect of the axillary and cephalic veins  into the subclavian vein and up the internal jugular vein as high as  the internal jugular vein could be seen in the neck. This thrombus  also extended into the deep venous structures of the mediastinum,  where seen.  2. No evidence for deep venous thrombosis of the right upper extremity  from the axillary region distally.    I called the results of the extensive occlusive deep venous thrombosis  of the central right upper extremity vessels  and right internal  jugular vein to Dr. Isaac on 2/23/2022 at approximately 3:30 PM.  Patient was sent back to Dr. Isaac's office for appropriate clinical  treatment.    SERGEI BARRETT MD         SYSTEM ID:  [id number]       EKG/strip results:  NA     Assessment and Plan     Parker Acevedo is a 51 year old PMH gm-en-y bypass, esophagojejunostomy c/b short gut, severe malnutrition on TPN, HTN, recurrent bacteremia/line infections presenting to ED for R arm pain and extensive RUE DVT.       #RUE DVT   #R arm swelling  Pt has significant DVT from axillary and cephalic veins into subclavian and jugular veins, extending into deep veins in mediastinum. Likely associated with Cm catheter.   - heparin gtt for anticoagulation  - IR consult placed -- appreciate recs  - will await IR recs before restarting TPN   - NPO in case of procedure    #Leukocytosis  #Elevated lactate  Pt presented with mild leukocytosis 14.2 and mild lactate elevation to 2.3. Lactate normalized to 1.0 with administration of IVF. Leukocytosis likely in s/o infection vs inflammation in s/o known RUE DVT. His CRP <2.9 is overall reassuring, less concerning for bacteremia or other infection.   - repeat CBC in AM  - f/u blood cultures  - f/u UA reflex to culture    #Hx rou-en-y gastric bypass  #short gut syndrome  #chronic malnutrition on TPN    - nutrition consult for TPN not placed -- will await for IR recs before restarting TPN     #Acute on chronic nausea  #Acute on chronic abdominal pain  Pt noticing increased episodes of vomiting and abdominal cramping elevated from baseline on day of presentation. He is chronically on zofran for nausea and vomiting.  - AXR - no evidence of bowel dilation   - IV, PO zofran PRN (home med)  - IV, PO compazine PRN   - continue PTA sucralfate    #Chronic Pain  Home pain regimen appears to be fentanyl patch 25 mcg/hr every 48 hours and oxycodone 5-10 mg TID PRN.  - increase to oxycodone liquid 5-10 mg QID PRN while  inpatient  - cont fentanyl patch   - pharm med rec consult to confirm regimen  - cont PTA miralax daily     Chronic  #ADHD  - held PTA adderall     #HTN  - cont PTA carvedilol    Diet: NPO  Fluids: NA  Prophylaxis:   DVT: on heparin gtt  Code: Full code  Disposition: likely home    Patient to be staffed with Dr. Wu.    Kimberly Guerra MD  Internal Medicine, PGY1  [phone]

## 2022-02-24 NOTE — ED PROVIDER NOTES
ED Provider Note  [facility]      History     Chief Complaint   Patient presents with     Chest Pain     Neck Pain     HPI  Parker Acevedo is a 51 year old male with a PMH of recurrent bacteremia/line infections, Celestino-en-Y gastric bypass, esophagojejunostomy complicated by short gut syndrome, severe malnutrition on TPN, HTN, ADHD, GERD and asthma who presents to the ED today reporting a blood clot.  Patient was seen earlier today a in clinic where he had a right upper extremity venous ultrasound which showed extensive DVT.  Here in the ED patient states that his symptoms started yesterday with right arm swelling and pain.  He reports pain starting from the right side of his neck, down into his right chest/shoulder and down into his right bicep.  He reports the swelling and pain has been worsening.  He states this morning his right arm was blue in color, then turned red.  Patient is endorsing a right-sided sore throat, throat tightening sensation, right arm weakness, right hand tingling and headache.  He also endorses chronic abdominal pain that is unchanged.  Patient reports he was started on Eliquis today, with his first dose being taken around 4 PM.  He reports that Dr. Isaac at [facility] prescribed the Eliquis.  Patient also notes he was vomiting profusely 3 days ago, as well as having palpitations yesterday which have since ceased.  Patient states he has been vaccinated with 2 doses of Covid vaccine, but no booster.  He states he has been infected with Covid in the past.  He notes that Dilaudid and oxycodone have worked in the past for his pain.  Patient denies fever.    Patient was seen earlier today at an office visit where he had labs drawn as well as a RUE ultrasound due to pain and swelling.  This ultrasound showed clot in the right upper extremity axillary and cephalic veins into the subclavian and up into the jugular to the neck.  Patient was started on  anticoagulation with Eliquis.    ULTRASOUND RIGHT UPPER EXTREMITY VENOUS DUPLEX   2/23/2022 3:35 PM   HISTORY: Pain and swelling of right upper extremity.  COMPARISON: Venous Doppler ultrasound of the right upper extremity dated 7/22/2019.                                                     IMPRESSION:   1. Extensive deep venous thrombosis of the right upper extremity  extending from the proximal aspect of the axillary and cephalic veins  into the subclavian vein and up the internal jugular vein as high as  the internal jugular vein could be seen in the neck. This thrombus  also extended into the deep venous structures of the mediastinum,  where seen.  2. No evidence for deep venous thrombosis of the right upper extremity from the axillary region distally.     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     Complication of anesthesia      Difficulty swallowing      Gastric ulcer, unspecified as acute or chronic, without mention of hemorrhage, perforation, or obstruction      Gastro-oesophageal reflux disease      Head injury      Hiatal hernia      Other bladder disorder      Other chronic pain      PONV (postoperative nausea and vomiting)      Severe malnutrition (H)     TPN     Short gut syndrome      Tobacco abuse      Past Surgical History:   Procedure Laterality Date     AMPUTATION       APPENDECTOMY       BACK SURGERY  11/3/2014    curve in the spine     BIOPSY LYMPH NODE CERVICAL N/A 2/20/2015    Procedure: BIOPSY LYMPH NODE CERVICAL;  Surgeon: Baron Scanlon MD;  Location:  OR     CHOLECYSTECTOMY       COLONOSCOPY N/A 7/14/2021    Procedure: COLONOSCOPY;  Surgeon: Jimbo Estrada MD;  [place] [facility] [place]      DISCECTOMY, FUSION CERVICAL ANTERIOR ONE LEVEL, COMBINED N/A 2/15/2017    Procedure: COMBINED DISCECTOMY, FUSION CERVICAL ANTERIOR ONE LEVEL;  Surgeon: Darren Campos MD;  Location: [facility] OR     ENDOSCOPIC INSERTION TUBE GASTROSTOMY  9/9/2013    Procedure: ENDOSCOPIC INSERTION TUBE GASTROSTOMY;;  Surgeon: Francis Vyas MD;  [facility] OR     ENDOSCOPIC ULTRASOUND UPPER GASTROINTESTINAL TRACT (GI)  4/29/2011    Procedure:ENDOSCOPIC ULTRASOUND UPPER GASTROINTESTINAL TRACT (GI); Both Procedures done Conjointly; Surgeon:NEREIDA HOUSER; Location:[facility] OR     ENDOSCOPIC ULTRASOUND UPPER GASTROINTESTINAL TRACT (GI)  9/9/2013    Procedure: ENDOSCOPIC ULTRASOUND UPPER GASTROINTESTINAL TRACT (GI);  Endoscopic Ultrasound Guide Gastrostomy Tube Placement  C-arm;  Surgeon: Noe Lizarraga MD;  [facility] OR     ENDOSCOPIC ULTRASOUND UPPER GASTROINTESTINAL TRACT (GI) N/A 2/24/2021    Procedure: ENDOSCOPIC ULTRASOUND, ESOPHAGOSCOPY / UPPER GASTROINTESTINAL TRACT (GI), esophagastrogastroduodenoscopy;  Surgeon: Berny Bach MD;  [facility] OR     ENDOSCOPY  03/25/11    EGD, [facility]     ENDOSCOPY  08/04/09    Upper Endoscopy, [facility]     ENDOSCOPY  01/05/09    Upper Endoscopy, [facility]     ESOPHAGOSCOPY, GASTROSCOPY, DUODENOSCOPY (EGD), COMBINED  4/20/2011    Procedure:COMBINED ESOPHAGOSCOPY, GASTROSCOPY, DUODENOSCOPY (EGD); Surgeon:FRANCIS VYAS; [facility]     ESOPHAGOSCOPY, GASTROSCOPY, DUODENOSCOPY (EGD), COMBINED  6/15/2011    Procedure:COMBINED ESOPHAGOSCOPY, GASTROSCOPY, DUODENOSCOPY (EGD); Surgeon:FRANCIS VYAS; [facility]     ESOPHAGOSCOPY, GASTROSCOPY, DUODENOSCOPY (EGD), COMBINED  6/12/2013    Procedure: COMBINED ESOPHAGOSCOPY, GASTROSCOPY, DUODENOSCOPY (EGD);;  Surgeon: Francis Vyas MD;  [facility]     ESOPHAGOSCOPY, GASTROSCOPY, DUODENOSCOPY (EGD), COMBINED  11/22/2013    Procedure: COMBINED ESOPHAGOSCOPY, GASTROSCOPY,  DUODENOSCOPY (EGD);;  Surgeon: Francis Vyas MD;  [facility]     ESOPHAGOSCOPY, GASTROSCOPY, DUODENOSCOPY (EGD), COMBINED  4/30/2014    Procedure: COMBINED ESOPHAGOSCOPY, GASTROSCOPY, DUODENOSCOPY (EGD);  Surgeon: Francis Vyas MD;  [facility]     ESOPHAGOSCOPY, GASTROSCOPY, DUODENOSCOPY (EGD), COMBINED N/A 2/20/2015    Procedure: COMBINED ESOPHAGOSCOPY, GASTROSCOPY, DUODENOSCOPY (EGD), BIOPSY SINGLE OR MULTIPLE;  Surgeon: Baron Scanlon MD;  Location[facility]     ESOPHAGOSCOPY, GASTROSCOPY, DUODENOSCOPY (EGD), COMBINED N/A 9/30/2015    Procedure: COMBINED ESOPHAGOSCOPY, GASTROSCOPY, DUODENOSCOPY (EGD);  Surgeon: Francis yVas MD;  Location:  GI     ESOPHAGOSCOPY, GASTROSCOPY, DUODENOSCOPY (EGD), COMBINED N/A 10/3/2019    Procedure: Upper Endoscopy;  Surgeon: Clif Morrow MD;  [facility]     GASTRECTOMY  6/22/2012    Procedure: GASTRECTOMY;  Open Approach, Excise Ulcers,Partial Gastrectomy, Esophagojejunostomy, Hiatal Hernia Repair, Extensive Lysis of Adhesions and Esaphagogastrodudenoscopy.;  Surgeon: Francis Vyas MD;  [facility]     GASTROJEJUNOSTOMY  08/26/09    Extensice enterolysis, partial resect. jejunum, part. resect gastric pouch, gastrojejunostomy anastomosis     HC ESOPH/GAS REFLUX TEST W NASAL IMPED ELECTRODE  8/5/2013    Procedure: ESOPHAGEAL IMPEDENCE FUNCTION TEST 1 HOUR OR LESS;  Surgeon: Halie Lang MD;  [facility]     HEAD & NECK SURGERY  2/15/2017    C5-C6     HERNIA REPAIR  2006    Umbilical hernia     HERNIORRHAPHY HIATAL  6/22/2012    Procedure: HERNIORRHAPHY HIATAL;;  Surgeon: Francis Vyas MD;  [facility]     HERNIORRHAPHY INGUINAL  11/22/2013    Procedure: HERNIORRHAPHY INGUINAL;;  Surgeon: Francis Vyas MD;  [facility]     INSERT PICC LINE Right 12/19/2019    Procedure: Picc Placement;  Surgeon: Per Dumont PA-C;  [facility]     INSERT PICC LINE Right 2/21/2020     Procedure: INSERTION, PICC;  Surgeon: Per Dumont PA-C;  [facility]     INSERT PORT VASCULAR ACCESS Right 12/19/2017    Procedure: INSERT PORT VASCULAR ACCESS;  Right Chest Port Placement ;  Surgeon: Lisandro Alejandro PA-C;  [facility]     INSERT PORT VASCULAR ACCESS Right 8/2/2018    Procedure: INSERT PORT VASCULAR ACCESS;  Place single lumen tunneled central venous access catheter;  Surgeon: Guy Jamil PA-C;  [facility]     IR CVC TUNNEL PLACEMENT > 5 YRS OF AGE  8/7/2019     IR CVC TUNNEL PLACEMENT > 5 YRS OF AGE  4/14/2020     IR CVC TUNNEL PLACEMENT > 5 YRS OF AGE  8/3/2020     IR CVC TUNNEL PLACEMENT > 5 YRS OF AGE  9/4/2020     IR CVC TUNNEL PLACEMENT > 5 YRS OF AGE  2/5/2021     IR CVC TUNNEL PLACEMENT > 5 YRS OF AGE  3/23/2021     IR CVC TUNNEL PLACEMENT > 5 YRS OF AGE  1/13/2022     IR CVC TUNNEL REMOVAL RIGHT  10/1/2019     IR CVC TUNNEL REMOVAL RIGHT  7/30/2020     IR CVC TUNNEL REMOVAL RIGHT  9/2/2020     IR CVC TUNNEL REMOVAL RIGHT  2/3/2021     IR CVC TUNNEL REMOVAL RIGHT  3/19/2021     IR CVC TUNNEL REMOVAL RIGHT  1/10/2022     IR CVC TUNNEL REVISION RIGHT  5/7/2021     IR FOLLOW UP VISIT OUTPATIENT  8/7/2019     IR PICC PLACEMENT > 5 YRS OF AGE  3/7/2019     IR PICC PLACEMENT > 5 YRS OF AGE  12/19/2019     IR PICC PLACEMENT > 5 YRS OF AGE  2/21/2020     LAPAROTOMY EXPLORATORY  11/22/2013    Procedure: LAPAROTOMY EXPLORATORY;  Exploratory Laparotomy, Upper Endoscopy, Left Inguinal Hernia Repair;  Surgeon: Francis Vyas MD;  [facility]     ORTHOPEDIC SURGERY       PICC INSERTION Right 03/16/2017    5fr DL BioFlo PICC, 42cm (3cm external) in the R medial brachial vein w/ tip in the SVC RA junction.     PICC INSERTION Left 09/23/2017    5fr DL BioFlo PICC, 45cm (1cm external) in the L basilic vein w/ tip in the SVC RA junction.     PICC INSERTION Right 05/16/2019    5Fr - 43cm, Medial brachial vein, low SVC     PICC INSERTION Right 10/02/2019    5Fr  - 43cm (2cm external), basilic vein, low SVC     SHAYLEE EN Y BOWEL  2003     SOFT TISSUE SURGERY       THORACIC SURGERY       TONSILLECTOMY       TRANSESOPHAGEAL ECHOCARDIOGRAM INTRAOPERATIVE N/A 1/8/2019    Procedure: TRANSESOPHAGEAL ECHOCARDIOGRAM INTRAOPERATIVE;  Surgeon: GENERIC ANESTHESIA PROVIDER;  Location:  OR     [facility] GASTRIC BYPASS,OBESE<100CM SHAYLEE-EN-Y  2002    lost 300 pounds     No current outpatient medications on file.    Allergies   Allergen Reactions     Bactrim [Sulfamethoxazole W/Trimethoprim] Rash     Penicillins Anaphylaxis     Please see Antimicrobial Management Team allergy assessment note 10/10/2018. Patient reported tolerating amoxicillin.     Doxycycline Rash     Vancomycin Rash     Rash after receiving vancomycin 3/28/16 (infusion reaction?). Tolerated with slower infusion and diphenhydramine premed.     Family History  Family History   Problem Relation Age of Onset     Gastrointestinal Disease Mother         Crohns disease     Anxiety Disorder Mother      Thyroid Disease Mother         Grave's disease     Cancer Father         ear cancer-skin cancer/melanoma     Breast Cancer Maternal Grandmother      Macular Degeneration Maternal Grandfather      Anxiety Disorder Sister      Diabetes Maternal Uncle      Breast Cancer Other      Hypertension No family hx of      Hyperlipidemia No family hx of      Cerebrovascular Disease No family hx of      Prostate Cancer No family hx of      Depression No family hx of      Anesthesia Reaction No family hx of      Asthma No family hx of      Osteoporosis No family hx of      Genetic Disorder No family hx of      Obesity No family hx of      Mental Illness No family hx of      Substance Abuse No family hx of      Glaucoma No family hx of      Social History   Social History     Tobacco Use     Smoking status: Light Tobacco Smoker     Packs/day: 0.10     Years: 3.00     Pack years: 0.30     Types: Cigarettes     Smokeless tobacco: Never Used     Tobacco  "comment: 2/4/2021    smokes 3 cigarettes/day   Vaping Use     Vaping Use: Never used   Substance Use Topics     Alcohol use: No     Comment: quit 2002     Drug use: No      Past medical history, past surgical history, medications, allergies, family history, and social history were reviewed with the patient. No additional pertinent items.       Review of Systems  A complete review of systems was performed with pertinent positives and negatives noted in the HPI, and all other systems negative.    Physical Exam   BP: 122/69  Pulse: 97  Temp: (!) 96.5  F (35.8  C)  Resp: 20  Height: 180.3 cm (5' 11\")  Weight: 83.9 kg (185 lb)  SpO2: 96 %  Physical Exam  General: Alert sitting on the bed in NAD   Head:  atraumatic   Neck: no c-spine/neck tenderness   CV: RRR,  pulse in the affected limb with > 2 seconds cap refill to digits distally   Resp:  breathing comfortably   Skin: Mild erythema over the area, no open wounds   Upper Extremity: Strength 5/5 finger abduction/finger-thumb opposition/thumb extension/thumb abduction, 5/5 wrist flexion/extension, 5/5 bicep flexion/tricep extension. Sensation  intact to light touch in for median/radial/ulnar/musculocutaneous/axiallary nerves.       ED Course     11:00 PM  The patient was seen and examined by Lizbeth Davey MD in Room ED25.     Soon after arrival, patient was evaluated to ensure distal blood flow to the clot was present. Patient had strong radial and ulnar pulses and good capillary refill to the digits.  He was given IV analgesia for pain control.   Patient was neurologically intact. I consulted IR to see if they would plan for lytics at this time. However, they said IV heparin usually was sufficient to resolve the clot.  Discussed risks and benefits of IV heparin and it was ordered.  We did not order a loading dose since patient already received and oral anticoagulant.   Patient was stable in the ER.   Given history, exam, and workup in the emergency department, patient " is unsafe to be discharged to home at this time and will be admitted to the hospital for further evaluation and treatment.        Results for orders placed or performed during the hospital encounter of 02/23/22   INR     Status: Normal   Result Value Ref Range    INR 1.05 0.85 - 1.15   Partial thromboplastin time     Status: Normal   Result Value Ref Range    aPTT 29 22 - 38 Seconds   Troponin I     Status: Normal   Result Value Ref Range    Troponin I High Sensitivity 3 <79 ng/L   Nt probnp inpatient (BNP)     Status: Normal   Result Value Ref Range    N terminal Pro BNP Inpatient 71 0 - 450 pg/mL   Basic metabolic panel     Status: Abnormal   Result Value Ref Range    Sodium 139 133 - 144 mmol/L    Potassium 3.5 3.4 - 5.3 mmol/L    Chloride 108 94 - 109 mmol/L    Carbon Dioxide (CO2) 23 20 - 32 mmol/L    Anion Gap 8 3 - 14 mmol/L    Urea Nitrogen 14 7 - 30 mg/dL    Creatinine 0.78 0.66 - 1.25 mg/dL    Calcium 8.4 (L) 8.5 - 10.1 mg/dL    Glucose 59 (L) 70 - 99 mg/dL    GFR Estimate >90 >60 mL/min/1.73m2   Lactic acid whole blood     Status: Abnormal   Result Value Ref Range    Lactic Acid 2.3 (H) 0.7 - 2.0 mmol/L   CBC with platelets and differential     Status: Abnormal   Result Value Ref Range    WBC Count 14.2 (H) 4.0 - 11.0 10e3/uL    RBC Count 4.45 4.40 - 5.90 10e6/uL    Hemoglobin 13.1 (L) 13.3 - 17.7 g/dL    Hematocrit 40.6 40.0 - 53.0 %    MCV 91 78 - 100 fL    MCH 29.4 26.5 - 33.0 pg    MCHC 32.3 31.5 - 36.5 g/dL    RDW 14.8 10.0 - 15.0 %    Platelet Count 209 150 - 450 10e3/uL    % Neutrophils 65 %    % Lymphocytes 22 %    % Monocytes 12 %    % Eosinophils 1 %    % Basophils 0 %    % Immature Granulocytes 0 %    NRBCs per 100 WBC 0 <1 /100    Absolute Neutrophils 9.1 (H) 1.6 - 8.3 10e3/uL    Absolute Lymphocytes 3.1 0.8 - 5.3 10e3/uL    Absolute Monocytes 1.7 (H) 0.0 - 1.3 10e3/uL    Absolute Eosinophils 0.2 0.0 - 0.7 10e3/uL    Absolute Basophils 0.1 0.0 - 0.2 10e3/uL    Absolute Immature Granulocytes  0.1 <=0.4 10e3/uL    Absolute NRBCs 0.0 10e3/uL   CBC with platelets differential     Status: Abnormal    Narrative    The following orders were created for panel order CBC with platelets differential.  Procedure                               Abnormality         Status                     ---------                               -----------         ------                     CBC with platelets and d...[[id number]]  Abnormal            Final result                 Please view results for these tests on the individual orders.     Medications   HYDROmorphone (PF) (DILAUDID) injection 0.5 mg (0.5 mg Intravenous Given 2/24/22 0148)   heparin infusion 25,000 units in D5W 250 mL ANTICOAGULANT ( Intravenous Canceled Entry 2/24/22 0141)   HYDROmorphone (PF) (DILAUDID) injection 0.5 mg (has no administration in time range)   ondansetron (ZOFRAN) injection 4 mg (has no administration in time range)   ondansetron (ZOFRAN) injection 4 mg (4 mg Intravenous Given 2/23/22 2236)   diphenhydrAMINE (BENADRYL) injection 25 mg (25 mg Intravenous Given 2/23/22 2343)   metoclopramide (REGLAN) injection 5 mg (5 mg Intravenous Given 2/23/22 2343)   0.9% sodium chloride BOLUS (1,000 mLs Intravenous New Bag 2/24/22 0015)   ondansetron (ZOFRAN) injection 4 mg (4 mg Intravenous Given 2/24/22 0104)        Assessments & Plan (with Medical Decision Making)   See MDM/plan above    Assessment:  Upper extremity DVT    Plan:  Continue IV heparin  Admit to Medicine forfurther evaluation and treatment      I have reviewed the nursing notes. I have reviewed the findings, diagnosis, plan and need for follow up with the patient.    New Prescriptions    No medications on file       Final diagnoses:   None   I, Miah Vargas, am serving as a trained medical scribe to document services personally performed by Lizbeth Davey MD, based on the provider's statements to me.     ILizbeth MD, was physically present and have reviewed and verified the accuracy  of this note documented by Miah Vargas.      --  Lizbeth Davey MD  [facility] EMERGENCY DEPARTMENT  2/23/2022     Lizbeth Davey MD  02/28/22 1148

## 2022-02-24 NOTE — PROGRESS NOTES
"CLINICAL NUTRITION SERVICES - ASSESSMENT NOTE     Nutrition Prescription    RECOMMENDATIONS FOR MDs/PROVIDERS TO ORDER:  Monitor fluid and hydration status     Malnutrition Status:    Moderate malnutrition in the context of chronic illness     Recommendations already ordered by Registered Dietitian (RD):  Supplemental TPN per home regimen:   Dosing weight: 81 kg   Access: Central Line (ok to use per TPN consult comment \"Per IR, ok to use Cm even with clot burden\")    --Cyclic TPN x 12 hours/evening (2000 to 0800, or per pt preference)  --Run @ rate of 60 mL/hr for 1 hour, 120 mL/hr for 10 hours, and decrease rate to 60 mL/hr x 1 hour for total of 12-hour TPN cycle.     --Total volume ~1320 mL or per phamD with dextrose of 175 g (max GIR= 2.5 mg/kg/min), 100 g AA (1.2 g/kg) and NO lipids for first week of PN (pt has already infused his 3-bags of lipid for this week, per report).   --Start 250 mL 20% Intralipid per home regimen 3x/week if remains in hospital next week.   --TPN provides: 175 g dex (595 kcal), 100 g AA (1.2 g/kg) and 250 mL 20% intralipid 3 days per week = 1209 Kcals/day (15  Kcal/kg) and 18% kcal from fat   --Electrolytes/Additives per pharmD, recommend infuvite daily and MTE-4  --Monitor bili, lytes, glucose, LFTs, TG at the start and weekly thereafter while on TPN    Future/Additional Recommendations:  --Monitor PN tolerance, PO intake, weight trends, fluid status, lytes/BG/LFTs, POC     REASON FOR ASSESSMENT  Parkre Acevedo is a/an 51 year old male assessed by the dietitian for Pharmacy/Nutrition to Start and Manage PN    CLINICAL HISTORY  Hx of RNY GB, esophagojejunostomy c/b short gut, severe malnutrition on TPN, HTN, and recent bacteremia/line infection. Presented to ED for R arm pain and blood clot. Nutrition consult to resume TPN was delayed while assessing IR recs. Per TPN consult received later, IR has given ok to use existing central line despite clot.     NUTRITION HISTORY  Home TPN " "regimen per [facility]:   Dosing weight 81 kg. 0081-5050 mL total volume, 175 gm dextrose (595 Kcals), 100 gm AA (400 Kcals and 1.2 gm/kg), Intralipid 250 mL bag 3 times per week (avg 214 Kcals/day). Total calories 1209 Kcals, 15 Kcal/kg/day. Standard vitamins/trace elements.   Cycled PN x 12 hours each evening. Pt/wife unsure of usual rate, as the pump is pre-programmed.     Noted increased N/V recently. Does have vomiting at baseline, takes Zofran at home to manage. Eats small meals, bites of food. Reports fairly stable wt with supplemental PN. Has G-tube for venting.     Per RD note from 1/9/22: \"Patient reports having reduced PO intake from November 2020 to February 2021 d/t not having a G-tube for drainage and therefore having nausea/abdominal discomfort with eating.  He also had COVID in November which affected his taste/smell and ability to eat.  He notes that sometimes his TPN causes him to feel nauseated or have reflux. His struggles with reflux, nausea/vomiting and dumping syndrome associated with eating.  He reports that there isn't a single food he can always tolerate, noting some days a certain food will make him vomit and then randomly he will be able to tolerate it.  Liquids generally are better tolerated than solid foods.  He sometimes sips on Boost and Gatorade. Overall, it seems like he tolerates very little nutrition orally at baseline. He notes has had too many peripheral lines and getting access is difficult for him. \"    CURRENT NUTRITION ORDERS  Diet: Regular  Intake/Tolerance: Eating a small lunch. Still in ED.     LABS    Reviewed available labs    MEDICATIONS    Miralax    ANTHROPOMETRICS  Height: 180.3 cm (5' 11\")  Most Recent Weight: 83.9 kg (185 lb)  IBW: 78 kg  108%IBW   BMI: Overweight BMI 25-29.9    Weight History:   UBW range ~81-92 kg, pending fluid status per pt report.   Wt Readings from Last 15 Encounters:   02/23/22 83.9 kg (185 lb)   02/23/22 86.2 kg (190 lb 1.6 oz)   01/11/22 87.8 kg " (193 lb 9.6 oz)   10/14/21 87 kg (191 lb 12.8 oz)   07/14/21 91.6 kg (202 lb)   07/08/21 92 kg (202 lb 14.4 oz)   05/12/21 79.4 kg (175 lb 1.6 oz)   05/07/21 79.4 kg (175 lb)   03/23/21 87.1 kg (192 lb)   02/24/21 85.6 kg (188 lb 11.4 oz)   02/22/21 84.4 kg (186 lb)   02/05/21 85.6 kg (188 lb 12.8 oz)   11/09/20 87.1 kg (192 lb)   09/03/20 90.4 kg (199 lb 3.2 oz)   08/27/20 92.9 kg (204 lb 14.4 oz)       Dosing Weight: 81 kg per home TPN dosing wt and reported UBW     ASSESSED NUTRITION NEEDS  Estimated Energy Needs: 5486-3420 kcals/day (25 - 30 kcals/kg)  Justification: Maintenance  Estimated Protein Needs:  grams protein/day (1.2 - 1.5 grams of pro/kg)  Justification: LBM preservation  Estimated Fluid Needs: 5316-8482 mL/day (1 mL/kcal)   Justification: Maintenance    PHYSICAL FINDINGS  See malnutrition section below.    MALNUTRITION  % Intake: Decreased intake does not meet criteria, supplemental TPN dependent   % Weight Loss: None noted  Subcutaneous Fat Loss: Facial region:  Mild, Upper arm:  Mild and Thoracic/intercostal:  Mild --Stable per pt   Muscle Loss: Facial & jaw region:  Mild, Scapular bone:  Mild, Thoracic region (clavicle, acromium bone, deltoid, trapezius, pectoral):  Mild and Upper arm (bicep, tricep):  Mild - Stable per pt   Fluid Accumulation/Edema: None noted  Malnutrition Diagnosis: Moderate malnutrition in the context of chronic illness     NUTRITION DIAGNOSIS  Altered GI function related to short gut syndrome as evidenced by reliant on parenteral nutrition to help meet 100% of nutrition needs.      INTERVENTIONS  Implementation  Nutrition education for nutrition relationship to health/disease   Collaboration with other providers - PharmD  Parenteral Nutrition/IV Fluids - Initiate per home regimen    Goals  1. Avg nutritional intake from supplemental TPN to meet a minimum of 15 kcal/kg and 1.2 g PRO/kg daily (per dosing wt 81 kg).  2. Weight not to fall below 81 kg      Monitoring/Evaluation  Progress toward goals will be monitored and evaluated per protocol.  Ari Baca RDN, LD, CNSC  6B RD pager: [phone] [Preferred]  6B work-room RD phone: *[phone]

## 2022-02-24 NOTE — PHARMACY-ADMISSION MEDICATION HISTORY
Admission Medication History Completed by Pharmacy    See [facility] Admission Navigator for allergy information, preferred outpatient pharmacy, prior to admission medications and immunization status.     Medication History Sources:     Jose West, patient interview    Changes made to PTA medication list (reason):    Added: None    Deleted: None    Changed: carvedilol 6.25 BID to 12.5 mg BID    Additional Information:    Per patient, fentanyl patch changed q48 hours. Last changed 2/22/22 @2200    Confirmed oxycodone dose 10 mg TID prn    Eliquis filled 2/23/22. Per patient took 1 dose yesterday at ~1600    Ergocalciferol dosed on Sundays    TPN formula from [employer]   o Plain (4x/week on Tu/Th/Sat/Sun): Wt 81 kg, Prosol 20% 100 gm/d, Dextrose 175 gm/d, Volume 1250 ml/d, Intralipid 20% zero; Lytes per DAY:  Na 50 meq/d, K 60 meq/d, Ca 5.5 meq/d, Mag 6 meq/d, Phos 8 mmol/d, 33% chloride, Infuvite 10 ml/day, Tralement 1 ml/day, famotidine 20 mg/d (12 hours; 100 ml OF)  o With lipid (3x/week on M/W/F): Wt 81 kg, Prosol 20% 100 gm/d , Dextrose 175 gm/d, Volume 1000 ml/d,  Intralipid 20% 250ml; Lytes per DAY:  Na 50 meq/d, K 60 meq/d, Ca 5.5 meq/d, Mag 6 meq/d, Phos 8 mmol/d, 33% chloride, Infuvite 10 ml/day, Tralement1 ml/day, Famotidine 20 mg/d (12hrs; 100 ml OF)  o NOTE: Per patient already took 3 doses of lipids this week on Sun, Mon, Tues    Prior to Admission medications    Medication Sig Last Dose Taking? Auth Provider   acetaminophen (TYLENOL) 32 mg/mL liquid Take 15.65 mLs (500 mg) by mouth every 4 hours as needed for fever or mild pain  Patient not taking: Reported on 2/23/2022   Chuy Isaac MD   albuterol (PROAIR HFA/PROVENTIL HFA/VENTOLIN HFA) 108 (90 Base) MCG/ACT inhaler Inhale 2 puffs into the lungs every 6 hours as needed   Reported, Patient   amphetamine-dextroamphetamine (ADDERALL) 20 MG tablet TAKE ONE TABLET BY MOUTH ONCE DAILY   Chuy Isaac MD   Apixaban Starter Pack (ELIQUIS DVT/PE  "STARTER PACK) 5 MG TBPK Take 10 mg by mouth 2 times daily for 7 days, THEN 5 mg 2 times daily for 23 days.   Chuy Isaac MD   carvedilol (COREG) 12.5 MG tablet Take 12.5 mg by mouth 2 times daily (with meals)   Unknown, Entered By History   cyanocobalamin (CYANOCOBALAMIN) 1000 MCG/ML injection INJECT 1 ML INTO THE MUSCLE EVERY 30 DAYS   Chuy Isaac MD   diphenhydrAMINE (BENADRYL) 12.5 MG/5ML syrup Take 25 mg by mouth every 4 hours as needed for itching, allergies or sleep  Patient not taking: Reported on 2/9/2022   Baron Quintanilla MD   EPINEPHrine (ANY BX GENERIC EQUIV) 0.3 MG/0.3ML injection 2-pack    Reported, Patient   [place] [facility]NAGED PATIENT Contact [facility] for patient specific medication information at [phone] on admission and discharge from the hospital.  Phones are answered 24 hours a day 7 days a week 365 days a year.    Providers - Choose \"CONTINUE HOME MED (no script)\" at discharge if patient treatment with home infusion will continue.   Ilsa Shine, PA   fentaNYL (DURAGESIC) 25 mcg/hr 72 hr patch Place 1 patch onto the skin every 48 hours remove old patch. Fill 03/01/22 and start 03/03/22.   Patti Milligan MD   insulin syringe-needle U-100 (29G X 1/2\" 1 ML) 29G X 1/2\" 1 ML miscellaneous Use to inject b12 every 30 days   Chuy Isaac MD   lactated ringers infusion Inject 1,000-2,000 mLs into the vein daily as needed   Francis Vyas MD   lidocaine (LIDODERM) 5 % patch Place 1-2 patches onto the skin every 24 hours Wear for 12 hours, remove for 12 hours.  OK to cut to better fit to size.   Patti Milligan MD   LORazepam (ATIVAN) 1 MG tablet TAKE 1 TABLET (1MG) BY MOUTH AS NEEDED FOR ANXIETY WITH TPN AND MEDICATIONS . JUST ONCE A DAY (30 TO LAST 30 DAYS)   Chuy Isaac MD   naloxone (NARCAN) 4 MG/0.1ML nasal spray Spray 1 spray (4 mg) into one nostril alternating nostrils once as needed for opioid reversal every 2-3 minutes " until assistance arrives  Patient not taking: Reported on 2/9/2022   Patti Milligan MD   nystatin (MYCOSTATIN) 556407 UNIT/GM external cream APPLY TOPICALLY 2 TIMES DAILY   Carlos Doherty DO   ondansetron (ZOFRAN-ODT) 8 MG ODT tab DISSOLVE ONE TABLET ON TONGUE EVERY 8 HOURS AS NEEDED FOR NAUSEA   Chuy Isaac MD   oxyCODONE (ROXICODONE) 5 MG/5ML solution Take 5-10 mLs (5-10 mg) by mouth 3 times daily as needed for pain Max of 30mg/day. Put at least 4 hours between doses. Fill 03/01/22 and start 03/03/22. 30 day supply.   Patti Milligan MD   pantoprazole (PROTONIX) 40 MG EC tablet Take 1 tablet (40 mg) by mouth daily   Chuy Isaac MD   parenteral nutrition - PTA/DISCHARGE ORDER Patient receives 2050 mL TPN every 14 hours through PICC at [facility].   Unknown, Entered By History   polyethylene glycol (MIRALAX) 17 GM/Dose powder Take 17 g by mouth daily   Patti Milligan MD   sucralfate (CARAFATE) 1 GM/10ML suspension TAKE 10MLS  BY MOUTH FOUR TIMES A DAY AS NEEDED   Chuy Isaac MD   VENTOLIN  (90 Base) MCG/ACT inhaler INHALE TWO PUFFS BY MOUTH EVERY 4 HOURS AS NEEDED FOR SHORTNESS OF BREATH /DYSPNEA OR WHEEZING   Chuy Isaac MD   vitamin D2 (ERGOCALCIFEROL) 70045 units (1250 mcg) capsule TAKE 1 CAPSULE (50,000 UNITS) BY MOUTH ONCE A WEEK   Chuy Isaac MD   NO ACTIVE MEDICATIONS .   Rupesh Rothman MD       Date completed: 02/24/22    Medication history completed by: Margie Hurd

## 2022-02-24 NOTE — PROGRESS NOTES
Resident/Fellow Attestation   I, Xena Rubio, was present with the medical/JACQUI student who participated in the service and in the documentation of the note.  I have verified the history and personally performed the physical exam and medical decision making.  I agree with the assessment and plan of care as documented in the note.      52 yo with complex GI/surgical history on chronic TPN who presented with extensive provoked DVT in RUE (R Cm catheter). Remains on heparin gtt. Ultrasound today with improved distal appearance but somewhat more proximal appearance of clot. Will discuss with IR.     Xena Rubio MD  PGY3  Date of Service (when I saw the patient): 02/25/22    [facility]    Progress Note - Medicine Service, [facility] TEAM 5       Date of Admission:  2/23/2022    Assessment & Plan            Parker Acevedo is a 51 year old male with PMHx Celestino-en-Y gastric bypass surgery (2003) c/b short gut syndrome with bowel dysmotility and malabsorption requiring TPN, recurrent bacteremia/line infections, chronic nausea/vomitting, and chronic pain admitted on 2/23/2022 with extensive RUE DVT. Ultrasound showed venous thrombosis in right subclavian, extending from proximal axillary vein to R internal jugular and brachycephalic vein. On exam there is noticeable swelling in right arm, some tenderness to palpation, and some notable weakness in right hand. Mr. Acevedo was started on heparin drip and IR consulted to see if any invasive procedure would be beneficial. Plan to continue heparin and obtain serial US to assess clot burden.    Today  -1L LR bolus today for fluid rehydration per home regimen   - Heparin gtt  - US RUE today, if same/improved will consider transition to Eliquis this afternoon.    RUE DVT   Right arm swelling  Most likely that extensive DVT is associated with Cm catheter. IR consulted and okay to continue using  catheter for TPN. Will continue heparin gtt and perform serial US.  - heparin gtt  - IR consulted, appreciate recomendations  - Okay to use Cm catheter for TPN     Leukocytosis - Resolved  Elevated lactate - Resolved  Pt presented with mild leukocytosis 14.2 and mild lactate elevation to 2.3. Lactate normalized to 1.0 with administration of IVF and most recent WBC 9.3. Most likely due to inflammation/stress from acute DVT. Infection is less likely without fevers, chills, dysuria, open wounds or erythema, or other obvious source of infection. CRP <2.9. Blood cultures x2 and UA collected.  - f/u blood cultures  - f/u UA reflex to culture     Hx gm-en-y gastric bypass c/b short gut syndrome  chronic malnutrition on TPN   IR okay with using Cm catheter for TPN.   - nutrition consult placed for TPN     Acute on chronic nausea  Acute on chronic abdominal pain  Pt noticing increased episodes of vomiting and abdominal cramping elevated from baseline on day of presentation. He is chronically on zofran for nausea and vomiting. Abdominal x-ray without signs of bowel dilation. EKG with QTc of 415 and okay to give Compazine if needed. Nausea and vomiting have been under control while in hospital.  - IV, PO zofran PRN (home med)  - IV, PO compazine PRN   - PTA sucralfate     Chronic Pain  Home pain regimen appears to be fentanyl patch 25 mcg/hr every 48 hours and oxycodone 5-10 mg TID PRN. Has been controlled okay in hospital so far.  - Oxycodone liquid 5-10 mg QID PRN while inpatient  - Fentanyl patch q48H   - cont PTA miralax daily      ADHD  -PTA adderall 20mg daily     HTN  - PTA carvedilol 12.5mg BID       Diet: Regular Diet Adult    DVT Prophylaxis: On systemic heparin  Sanchez Catheter: Not present  Fluids: None  Central Lines: PRESENT     Cardiac Monitoring: None  Code Status: Full Code      Disposition Plan   Discharge in 1-3 days     Discharge pending US results and transition to [facility].       The patient's  care was discussed with the Attending Physician, Dr. Wu.    Koko Mathur, MS4  Medical Student  Medicine Service, [facility] TEAM 5  [facility]  Securely message with the [employer] Web Console (learn more here)  Text page via [facility] Paging/Directory   Please see signed in provider for up to date coverage information    ______________________________________________________________________    Interval History   No acute overnight events. No increased SOB, chest pain, abdominal pain. No current lightheadedness or dizziness, but does notice some lightheadedness upon standing over past week. The right arm continues to be painful and swollen with not much change overnight. He feels that his chronic nausea/vomitting and chronic pain are being well controlled in hospital.    Data reviewed today: I reviewed all medications, new labs and imaging results over the last 24 hours. I personally reviewed no images or EKG's today.    Physical Exam   Vital Signs: Temp: 98  F (36.7  C) Temp src: Oral BP: (!) 180/100 Pulse: 75   Resp: 20 SpO2: 99 % O2 Device: None (Room air)    Weight: 185 lbs 0 oz  Physical Exam  Constitutional:       General: He is not in acute distress.     Appearance: He is not toxic-appearing.   HENT:      Head: Normocephalic and atraumatic.      Right Ear: External ear normal.      Left Ear: External ear normal.      Nose: Nose normal.   Eyes:      Extraocular Movements: Extraocular movements intact.      Conjunctiva/sclera: Conjunctivae normal.   Cardiovascular:      Rate and Rhythm: Normal rate and regular rhythm.      Heart sounds: Normal heart sounds. No murmur heard.    No friction rub. No gallop.   Pulmonary:      Effort: Pulmonary effort is normal.      Breath sounds: Normal breath sounds. No wheezing, rhonchi or rales.   Abdominal:      Palpations: Abdomen is soft.      Tenderness: There is no abdominal tenderness. There is no guarding or rebound.    Musculoskeletal:         General: Swelling (RUE - unchanged from yesterday) and tenderness (RUE - mostly pain palpation of biceps muscle) present.      Cervical back: Rigidity (Right sided swelling and pain with movement) and tenderness (Right sided tenderness) present.   Skin:     General: Skin is warm.      Findings: No erythema or rash.   Neurological:      Mental Status: He is alert and oriented to person, place, and time.      Motor: Weakness (Right arm weakness. (4/5 hand strength, 5/5 Biceps strength)) present.   Psychiatric:         Mood and Affect: Mood normal.         Behavior: Behavior normal.         Thought Content: Thought content normal.         Judgment: Judgment normal.       Data   Recent Labs   Lab 02/25/22  1054 02/25/22  0437 02/24/22  1809 02/23/22  2207 02/23/22  1621   WBC 9.3  --   --  14.2* 10.4   HGB 11.6*  --   --  13.1* 13.7   MCV 94  --   --  91 92     --   --  209 198   INR 1.04  --   --  1.05  --      --   --  139 140   POTASSIUM 4.2  --   --  3.5 4.0   CHLORIDE 105  --   --  108 109   CO2 30  --   --  23 29   BUN 19  --   --  14 12   CR 0.83  --   --  0.78 0.81   ANIONGAP 3  --   --  8 2*   SILAS 8.6  --   --  8.4* 8.9   GLC 96 102* 96 59* 99   ALBUMIN 3.1*  --   --   --  3.5   PROTTOTAL 6.6*  --   --   --  7.4   BILITOTAL 0.9  --   --   --  0.7   ALKPHOS 76  --   --   --  85   ALT 32  --   --   --  44   AST 18  --   --   --  28     No results found for this or any previous visit (from the past 24 hour(s)).

## 2022-02-25 ENCOUNTER — APPOINTMENT (OUTPATIENT)
Dept: ULTRASOUND IMAGING | Facility: CLINIC | Age: 52
DRG: 315 | End: 2022-02-25
Payer: COMMERCIAL

## 2022-02-25 LAB
ALBUMIN SERPL-MCNC: 3.1 G/DL (ref 3.4–5)
ALBUMIN UR-MCNC: NEGATIVE MG/DL
ALP SERPL-CCNC: 76 U/L (ref 40–150)
ALT SERPL W P-5'-P-CCNC: 32 U/L (ref 0–70)
ANION GAP SERPL CALCULATED.3IONS-SCNC: 3 MMOL/L (ref 3–14)
APPEARANCE UR: CLEAR
APTT PPP: 142 SECONDS (ref 22–38)
APTT PPP: 158 SECONDS (ref 22–38)
APTT PPP: 71 SECONDS (ref 22–38)
AST SERPL W P-5'-P-CCNC: 18 U/L (ref 0–45)
BILIRUB DIRECT SERPL-MCNC: 0.2 MG/DL (ref 0–0.2)
BILIRUB SERPL-MCNC: 0.9 MG/DL (ref 0.2–1.3)
BILIRUB UR QL STRIP: NEGATIVE
BUN SERPL-MCNC: 19 MG/DL (ref 7–30)
CALCIUM SERPL-MCNC: 8.6 MG/DL (ref 8.5–10.1)
CHLORIDE BLD-SCNC: 105 MMOL/L (ref 94–109)
CO2 SERPL-SCNC: 30 MMOL/L (ref 20–32)
COLOR UR AUTO: YELLOW
CREAT SERPL-MCNC: 0.83 MG/DL (ref 0.66–1.25)
ERYTHROCYTE [DISTWIDTH] IN BLOOD BY AUTOMATED COUNT: 15 % (ref 10–15)
GFR SERPL CREATININE-BSD FRML MDRD: >90 ML/MIN/1.73M2
GLUCOSE BLD-MCNC: 96 MG/DL (ref 70–99)
GLUCOSE BLDC GLUCOMTR-MCNC: 102 MG/DL (ref 70–99)
GLUCOSE BLDC GLUCOMTR-MCNC: 113 MG/DL (ref 70–99)
GLUCOSE BLDC GLUCOMTR-MCNC: 83 MG/DL (ref 70–99)
GLUCOSE UR STRIP-MCNC: NEGATIVE MG/DL
HCT VFR BLD AUTO: 37.3 % (ref 40–53)
HGB BLD-MCNC: 11.6 G/DL (ref 13.3–17.7)
HGB UR QL STRIP: NEGATIVE
HOLD SPECIMEN: NORMAL
HOLD SPECIMEN: NORMAL
INR PPP: 1.04 (ref 0.85–1.15)
KETONES UR STRIP-MCNC: NEGATIVE MG/DL
LEUKOCYTE ESTERASE UR QL STRIP: NEGATIVE
MAGNESIUM SERPL-MCNC: 2.1 MG/DL (ref 1.6–2.3)
MCH RBC QN AUTO: 29.1 PG (ref 26.5–33)
MCHC RBC AUTO-ENTMCNC: 31.1 G/DL (ref 31.5–36.5)
MCV RBC AUTO: 94 FL (ref 78–100)
NITRATE UR QL: NEGATIVE
PH UR STRIP: 6.5 [PH] (ref 5–7)
PHOSPHATE SERPL-MCNC: 4 MG/DL (ref 2.5–4.5)
PLATELET # BLD AUTO: 196 10E3/UL (ref 150–450)
POTASSIUM BLD-SCNC: 4.2 MMOL/L (ref 3.4–5.3)
PROT SERPL-MCNC: 6.6 G/DL (ref 6.8–8.8)
RBC # BLD AUTO: 3.98 10E6/UL (ref 4.4–5.9)
SODIUM SERPL-SCNC: 138 MMOL/L (ref 133–144)
SP GR UR STRIP: 1.02 (ref 1–1.03)
UFH PPP CHRO-ACNC: 0.46 IU/ML
UFH PPP CHRO-ACNC: 0.57 IU/ML
UROBILINOGEN UR STRIP-MCNC: 3 MG/DL
WBC # BLD AUTO: 9.3 10E3/UL (ref 4–11)

## 2022-02-25 PROCEDURE — 99232 SBSQ HOSP IP/OBS MODERATE 35: CPT | Performed by: STUDENT IN AN ORGANIZED HEALTH CARE EDUCATION/TRAINING PROGRAM

## 2022-02-25 PROCEDURE — 93971 EXTREMITY STUDY: CPT | Mod: RT

## 2022-02-25 PROCEDURE — 80053 COMPREHEN METABOLIC PANEL: CPT | Performed by: STUDENT IN AN ORGANIZED HEALTH CARE EDUCATION/TRAINING PROGRAM

## 2022-02-25 PROCEDURE — 258N000003 HC RX IP 258 OP 636: Performed by: STUDENT IN AN ORGANIZED HEALTH CARE EDUCATION/TRAINING PROGRAM

## 2022-02-25 PROCEDURE — 84134 ASSAY OF PREALBUMIN: CPT | Performed by: STUDENT IN AN ORGANIZED HEALTH CARE EDUCATION/TRAINING PROGRAM

## 2022-02-25 PROCEDURE — 85610 PROTHROMBIN TIME: CPT | Performed by: STUDENT IN AN ORGANIZED HEALTH CARE EDUCATION/TRAINING PROGRAM

## 2022-02-25 PROCEDURE — 36592 COLLECT BLOOD FROM PICC: CPT | Performed by: STUDENT IN AN ORGANIZED HEALTH CARE EDUCATION/TRAINING PROGRAM

## 2022-02-25 PROCEDURE — 258N000003 HC RX IP 258 OP 636

## 2022-02-25 PROCEDURE — 85520 HEPARIN ASSAY: CPT | Performed by: STUDENT IN AN ORGANIZED HEALTH CARE EDUCATION/TRAINING PROGRAM

## 2022-02-25 PROCEDURE — 81003 URINALYSIS AUTO W/O SCOPE: CPT

## 2022-02-25 PROCEDURE — 84100 ASSAY OF PHOSPHORUS: CPT | Performed by: STUDENT IN AN ORGANIZED HEALTH CARE EDUCATION/TRAINING PROGRAM

## 2022-02-25 PROCEDURE — 85730 THROMBOPLASTIN TIME PARTIAL: CPT | Performed by: STUDENT IN AN ORGANIZED HEALTH CARE EDUCATION/TRAINING PROGRAM

## 2022-02-25 PROCEDURE — 250N000011 HC RX IP 250 OP 636

## 2022-02-25 PROCEDURE — 250N000013 HC RX MED GY IP 250 OP 250 PS 637

## 2022-02-25 PROCEDURE — 250N000011 HC RX IP 250 OP 636: Performed by: STUDENT IN AN ORGANIZED HEALTH CARE EDUCATION/TRAINING PROGRAM

## 2022-02-25 PROCEDURE — 85027 COMPLETE CBC AUTOMATED: CPT

## 2022-02-25 PROCEDURE — 83735 ASSAY OF MAGNESIUM: CPT | Performed by: STUDENT IN AN ORGANIZED HEALTH CARE EDUCATION/TRAINING PROGRAM

## 2022-02-25 PROCEDURE — 82248 BILIRUBIN DIRECT: CPT | Performed by: STUDENT IN AN ORGANIZED HEALTH CARE EDUCATION/TRAINING PROGRAM

## 2022-02-25 PROCEDURE — 250N000013 HC RX MED GY IP 250 OP 250 PS 637: Performed by: STUDENT IN AN ORGANIZED HEALTH CARE EDUCATION/TRAINING PROGRAM

## 2022-02-25 PROCEDURE — 120N000002 HC R&B MED SURG/OB UMMC

## 2022-02-25 PROCEDURE — 99207 PR CDG-MDM COMPONENT: MEETS MODERATE - DOWN CODED: CPT | Performed by: STUDENT IN AN ORGANIZED HEALTH CARE EDUCATION/TRAINING PROGRAM

## 2022-02-25 PROCEDURE — 93971 EXTREMITY STUDY: CPT | Mod: 26 | Performed by: STUDENT IN AN ORGANIZED HEALTH CARE EDUCATION/TRAINING PROGRAM

## 2022-02-25 RX ORDER — HEPARIN SODIUM 10000 [USP'U]/100ML
0-5000 INJECTION, SOLUTION INTRAVENOUS CONTINUOUS
Status: DISCONTINUED | OUTPATIENT
Start: 2022-02-25 | End: 2022-02-27

## 2022-02-25 RX ADMIN — BENZOCAINE AND MENTHOL 1 LOZENGE: 15; 2.6 LOZENGE ORAL at 17:48

## 2022-02-25 RX ADMIN — OXYCODONE HYDROCHLORIDE 10 MG: 5 SOLUTION ORAL at 10:38

## 2022-02-25 RX ADMIN — ONDANSETRON 8 MG: 4 TABLET, ORALLY DISINTEGRATING ORAL at 10:37

## 2022-02-25 RX ADMIN — PROCHLORPERAZINE MALEATE 10 MG: 5 TABLET ORAL at 14:45

## 2022-02-25 RX ADMIN — OXYCODONE HYDROCHLORIDE 10 MG: 5 SOLUTION ORAL at 04:32

## 2022-02-25 RX ADMIN — DEXTROAMPHETAMINE SACCHARATE, AMPHETAMINE ASPARTATE, DEXTROAMPHETAMINE SULFATE AND AMPHETAMINE SULFATE 20 MG: 2.5; 2.5; 2.5; 2.5 TABLET ORAL at 08:13

## 2022-02-25 RX ADMIN — OXYCODONE HYDROCHLORIDE 10 MG: 5 SOLUTION ORAL at 16:30

## 2022-02-25 RX ADMIN — ONDANSETRON 8 MG: 4 TABLET, ORALLY DISINTEGRATING ORAL at 04:32

## 2022-02-25 RX ADMIN — ACETAMINOPHEN 650 MG: 325 SOLUTION ORAL at 16:29

## 2022-02-25 RX ADMIN — ONDANSETRON 8 MG: 4 TABLET, ORALLY DISINTEGRATING ORAL at 22:30

## 2022-02-25 RX ADMIN — CARVEDILOL 12.5 MG: 12.5 TABLET, FILM COATED ORAL at 17:48

## 2022-02-25 RX ADMIN — LORAZEPAM 1 MG: 1 TABLET ORAL at 22:30

## 2022-02-25 RX ADMIN — HEPARIN SODIUM 2200 UNITS/HR: 1000 INJECTION INTRAVENOUS; SUBCUTANEOUS at 04:32

## 2022-02-25 RX ADMIN — LORAZEPAM 1 MG: 1 TABLET ORAL at 00:00

## 2022-02-25 RX ADMIN — FENTANYL 1 PATCH: 25 PATCH, EXTENDED RELEASE TRANSDERMAL at 22:41

## 2022-02-25 RX ADMIN — PANTOPRAZOLE SODIUM 40 MG: 40 TABLET, DELAYED RELEASE ORAL at 08:13

## 2022-02-25 RX ADMIN — SODIUM CHLORIDE, POTASSIUM CHLORIDE, SODIUM LACTATE AND CALCIUM CHLORIDE 1000 ML: 600; 310; 30; 20 INJECTION, SOLUTION INTRAVENOUS at 10:38

## 2022-02-25 RX ADMIN — CARVEDILOL 12.5 MG: 12.5 TABLET, FILM COATED ORAL at 08:13

## 2022-02-25 RX ADMIN — ONDANSETRON 8 MG: 4 TABLET, ORALLY DISINTEGRATING ORAL at 16:30

## 2022-02-25 RX ADMIN — ACETAMINOPHEN 650 MG: 325 SOLUTION ORAL at 22:30

## 2022-02-25 RX ADMIN — OXYCODONE HYDROCHLORIDE 10 MG: 5 SOLUTION ORAL at 22:30

## 2022-02-25 ASSESSMENT — ACTIVITIES OF DAILY LIVING (ADL)
ADLS_ACUITY_SCORE: 5

## 2022-02-25 NOTE — PLAN OF CARE
Goal Outcome Evaluation:    Plan of Care Reviewed With: patient     Overall Patient Progress: no change    Outcome Evaluation: C/o pain in back & R arm, tolerable w/ PRN oxy.  C/o nausea, tolerable w/ PRN zofran.  R arm reddened & swollen, pt reports it is numb/ tingly but better.  PTT level critically high, heparin gtt paused & decreased to 1900 units/hr w/ recheck @ 1230.    A&Ox4.  Slept most of the night.  Up ad vitor, walked halls x1.  VSS on RA, tele NSR.  Pt refusing continuous pulse ox this AM.  CVC infusing TPN & heparin gtt.  L PIV SL.  G-tube clamped, pt intermittently puts it to gravity (independently).  Small amount of UOP, pt's urine sample was old so unable to send to lab.  Still needing urine sample, pt aware.  Continue to monitor & follow POC.

## 2022-02-25 NOTE — PROVIDER NOTIFICATION
Provider notified- patient reporting continuous sore, tender and intermittently sharp pain to R arm near arm pit. Reports pain has been stable today but it feels more tender from time to time. BP, HR, and O2 sat stable. On tele, reading NSR. Will continue to monitor.

## 2022-02-25 NOTE — PROGRESS NOTES
Patient admitted to  at 1730. Belongings remain with patient and are accounted for. Wife, Rose, present at bedside.

## 2022-02-25 NOTE — PLAN OF CARE
Goal Outcome Evaluation:  Plan of Care Reviewed With: patient    Outcome Evaluation: Pt ambulating frequently around the unit, steady. Calm, coorperative, oriented x4. Cm line OK to use per IR, purple hub infusing cycled TPN for nutrition, red hub infusing heparin at 22 mL/hr. PTT at 2000 was therapeutic, recheck is at 0300 2/25. Pain controlled with prn oxycodone and tylenol. Patient also complains of continuous nausea, no emesis, utilizing zofran and compazine with some relief. Good appetite at dinner. Tele and continuous pulse ox to monitor given DVT. Tele reading sinus mary. Blood sugar checks q6h. Blood sugar check at 1800 96. R arm swollen with significant pain. Patient stated intermittent mild numbness/tingling to R arm as well. Educated provided on DVTs. Patient and wife verbalized understanding. Pending urine sample.    Overall Patient Progress: improving

## 2022-02-25 NOTE — PROGRESS NOTES
This is a recent snapshot of the patient's [facility] medical record.  For current drug dose and complete information and questions, call [phone] or In Basket pool, [facility] ([id number])  CSN Number:  [id number]

## 2022-02-25 NOTE — CONSULTS
Care Management Initial Consult    General Information  Assessment completed with: Patient, Spouse or significant other,  (Meir)  Type of CM/SW Visit: Initial Assessment    Primary Care Provider verified and updated as needed: Yes   Readmission within the last 30 days:        Reason for Consult: discharge planning  Advance Care Planning: Advance Care Planning Reviewed: present on chart          Communication Assessment  Patient's communication style: spoken language (English or Bilingual)    Hearing Difficulty or Deaf: no   Wear Glasses or Blind: no    Cognitive  Cognitive/Neuro/Behavioral: WDL                      Living Environment:   People in home: child(francheska), adult, spouse   (Rose )  Current living Arrangements: house      Able to return to prior arrangements: yes       Family/Social Support:  Care provided by: spouse/significant other, homecare agency  Provides care for: no one  Marital Status:   Wife, Children   (Rose - She is also his PCA)       Description of Support System: Supportive    Support Assessment: Adequate family and caregiver support    Current Resources:   Patient receiving home care services: Yes  Skilled Home Care Services: Skilled Nursing, Home Health Aid  Community Resources: Infusion Services  Equipment currently used at home: none  Supplies currently used at home: Enteral Nutrition & Supplies    Employment/Financial:  Employment Status: disabled (As of 2015)        Financial Concerns: No concerns identified   Referral to Financial Worker: No       Lifestyle & Psychosocial Needs:  Social Determinants of Health     Tobacco Use: High Risk     Smoking Tobacco Use: Light Tobacco Smoker     Smokeless Tobacco Use: Never Used   Alcohol Use: Not At Risk     Frequency of Alcohol Consumption: Never     Average Number of Drinks: Patient refused     Frequency of Binge Drinking: Never   Financial Resource Strain: Not on file   Food Insecurity: Not on file   Transportation Needs:  Not on file   Physical Activity: Not on file   Stress: Not on file   Social Connections: Not on file   Intimate Partner Violence: Not At Risk     Fear of Current or Ex-Partner: No     Emotionally Abused: No     Physically Abused: No     Sexually Abused: No   Depression: Not at risk     PHQ-2 Score: 0   Housing Stability: Not on file       Functional Status:  Prior to admission patient needed assistance:   Dependent ADLs:: Independent          Mental Health Status:  Mental Health Status: No Current Concerns       Chemical Dependency Status:  Chemical Dependency Status: No Current Concerns             Values/Beliefs:  Spiritual, Cultural Beliefs, Jain Practices, Values that affect care:           Additional Information:  This writer spoke with Meir. Parker and Rose denied any questions or concerns related to their hospital stay. They indicated that they have an immense support system at home and have been open with [facility] infusion for 7 years. They recently established care with [facility] care due to moving residence. Meir denied any further questions or concerns.     Amalia Vazquez, MSW, LICSW

## 2022-02-25 NOTE — PROGRESS NOTES
Admission/Transfer from: [facility] ED  2 RN skin assessment completed. Yes  Significant findings include: Blanchable redness to sacrum, dry skin throughout, mild rash to chest, redness around G-tube site, redness + swelling to R hallux, swelling and mild bruising to RUE.   [facility] Nurse Consult Ordered? No

## 2022-02-25 NOTE — PLAN OF CARE
Goal Outcome Evaluation:    Plan of Care Reviewed With: patient has been very active, up ambulating outside multiple times this shift. Still c/o pain in R arm, oxycodone given per request. R arm still swollen but not red or warm to touch. PTT 0.7 late morning, no change in Hep drip rate. Next PTT recheck scheduled for 1830, US of NAOMY this afternoon, result pending. Pt reported intermittent nausea, Zofran and compazine given. Declined breakfast, had 25% for lunch. Pt intermittently hooked G tube to gravity drainage per self when in room. Pt has been stable, able to make needs known.     Overall Patient Progress: no change    Outcome Evaluation: C/o pain in back & R arm, tolerable w/ PRN oxy.  C/o nausea, tolerable w/ PRN zofran.  R arm reddened & swollen.  PTT level critically high, heparin gtt paused & decreased to 1900 units/hr w/ recheck @ 1230.

## 2022-02-26 ENCOUNTER — HOME INFUSION (PRE-WILLOW HOME INFUSION) (OUTPATIENT)
Dept: PHARMACY | Facility: CLINIC | Age: 52
End: 2022-02-26

## 2022-02-26 ENCOUNTER — APPOINTMENT (OUTPATIENT)
Dept: CT IMAGING | Facility: CLINIC | Age: 52
DRG: 315 | End: 2022-02-26
Payer: COMMERCIAL

## 2022-02-26 LAB
ANION GAP SERPL CALCULATED.3IONS-SCNC: 4 MMOL/L (ref 3–14)
BUN SERPL-MCNC: 18 MG/DL (ref 7–30)
CALCIUM SERPL-MCNC: 8.4 MG/DL (ref 8.5–10.1)
CHLORIDE BLD-SCNC: 107 MMOL/L (ref 94–109)
CO2 SERPL-SCNC: 30 MMOL/L (ref 20–32)
CREAT SERPL-MCNC: 0.9 MG/DL (ref 0.66–1.25)
ERYTHROCYTE [DISTWIDTH] IN BLOOD BY AUTOMATED COUNT: 15.2 % (ref 10–15)
GFR SERPL CREATININE-BSD FRML MDRD: >90 ML/MIN/1.73M2
GLUCOSE BLD-MCNC: 105 MG/DL (ref 70–99)
GLUCOSE BLDC GLUCOMTR-MCNC: 124 MG/DL (ref 70–99)
GLUCOSE BLDC GLUCOMTR-MCNC: 133 MG/DL (ref 70–99)
GLUCOSE BLDC GLUCOMTR-MCNC: 81 MG/DL (ref 70–99)
GLUCOSE BLDC GLUCOMTR-MCNC: 94 MG/DL (ref 70–99)
HCT VFR BLD AUTO: 34.8 % (ref 40–53)
HGB BLD-MCNC: 10.7 G/DL (ref 13.3–17.7)
MCH RBC QN AUTO: 29.5 PG (ref 26.5–33)
MCHC RBC AUTO-ENTMCNC: 30.7 G/DL (ref 31.5–36.5)
MCV RBC AUTO: 96 FL (ref 78–100)
PHOSPHATE SERPL-MCNC: 4.2 MG/DL (ref 2.5–4.5)
PLATELET # BLD AUTO: 191 10E3/UL (ref 150–450)
POTASSIUM BLD-SCNC: 3.9 MMOL/L (ref 3.4–5.3)
RBC # BLD AUTO: 3.63 10E6/UL (ref 4.4–5.9)
SODIUM SERPL-SCNC: 141 MMOL/L (ref 133–144)
UFH PPP CHRO-ACNC: 0.45 IU/ML
UFH PPP CHRO-ACNC: 0.46 IU/ML
WBC # BLD AUTO: 6.4 10E3/UL (ref 4–11)

## 2022-02-26 PROCEDURE — 258N000003 HC RX IP 258 OP 636: Performed by: STUDENT IN AN ORGANIZED HEALTH CARE EDUCATION/TRAINING PROGRAM

## 2022-02-26 PROCEDURE — 82310 ASSAY OF CALCIUM: CPT | Performed by: STUDENT IN AN ORGANIZED HEALTH CARE EDUCATION/TRAINING PROGRAM

## 2022-02-26 PROCEDURE — 85520 HEPARIN ASSAY: CPT | Performed by: STUDENT IN AN ORGANIZED HEALTH CARE EDUCATION/TRAINING PROGRAM

## 2022-02-26 PROCEDURE — 250N000013 HC RX MED GY IP 250 OP 250 PS 637

## 2022-02-26 PROCEDURE — 250N000011 HC RX IP 250 OP 636: Performed by: STUDENT IN AN ORGANIZED HEALTH CARE EDUCATION/TRAINING PROGRAM

## 2022-02-26 PROCEDURE — 71275 CT ANGIOGRAPHY CHEST: CPT

## 2022-02-26 PROCEDURE — 36592 COLLECT BLOOD FROM PICC: CPT | Performed by: STUDENT IN AN ORGANIZED HEALTH CARE EDUCATION/TRAINING PROGRAM

## 2022-02-26 PROCEDURE — 84100 ASSAY OF PHOSPHORUS: CPT | Performed by: STUDENT IN AN ORGANIZED HEALTH CARE EDUCATION/TRAINING PROGRAM

## 2022-02-26 PROCEDURE — 250N000009 HC RX 250: Performed by: STUDENT IN AN ORGANIZED HEALTH CARE EDUCATION/TRAINING PROGRAM

## 2022-02-26 PROCEDURE — 999N000248 HC STATISTIC IV INSERT WITH US BY RN

## 2022-02-26 PROCEDURE — 99233 SBSQ HOSP IP/OBS HIGH 50: CPT | Mod: GC | Performed by: STUDENT IN AN ORGANIZED HEALTH CARE EDUCATION/TRAINING PROGRAM

## 2022-02-26 PROCEDURE — 36592 COLLECT BLOOD FROM PICC: CPT | Performed by: EMERGENCY MEDICINE

## 2022-02-26 PROCEDURE — 71275 CT ANGIOGRAPHY CHEST: CPT | Mod: 26 | Performed by: RADIOLOGY

## 2022-02-26 PROCEDURE — 250N000013 HC RX MED GY IP 250 OP 250 PS 637: Performed by: STUDENT IN AN ORGANIZED HEALTH CARE EDUCATION/TRAINING PROGRAM

## 2022-02-26 PROCEDURE — 120N000002 HC R&B MED SURG/OB UMMC

## 2022-02-26 PROCEDURE — 85014 HEMATOCRIT: CPT | Performed by: EMERGENCY MEDICINE

## 2022-02-26 RX ORDER — OXYCODONE HCL 5 MG/5 ML
10 SOLUTION, ORAL ORAL ONCE
Status: COMPLETED | OUTPATIENT
Start: 2022-02-26 | End: 2022-02-26

## 2022-02-26 RX ORDER — IOPAMIDOL 755 MG/ML
64 INJECTION, SOLUTION INTRAVASCULAR ONCE
Status: COMPLETED | OUTPATIENT
Start: 2022-02-26 | End: 2022-02-26

## 2022-02-26 RX ADMIN — OXYCODONE HYDROCHLORIDE 10 MG: 5 SOLUTION ORAL at 04:02

## 2022-02-26 RX ADMIN — DEXTROAMPHETAMINE SACCHARATE, AMPHETAMINE ASPARTATE, DEXTROAMPHETAMINE SULFATE AND AMPHETAMINE SULFATE 20 MG: 2.5; 2.5; 2.5; 2.5 TABLET ORAL at 08:13

## 2022-02-26 RX ADMIN — OXYCODONE HYDROCHLORIDE 10 MG: 5 SOLUTION ORAL at 08:42

## 2022-02-26 RX ADMIN — PANTOPRAZOLE SODIUM 40 MG: 40 TABLET, DELAYED RELEASE ORAL at 08:12

## 2022-02-26 RX ADMIN — BENZOCAINE AND MENTHOL 1 LOZENGE: 15; 2.6 LOZENGE ORAL at 16:39

## 2022-02-26 RX ADMIN — OXYCODONE HYDROCHLORIDE 10 MG: 5 SOLUTION ORAL at 22:17

## 2022-02-26 RX ADMIN — CARVEDILOL 12.5 MG: 12.5 TABLET, FILM COATED ORAL at 08:12

## 2022-02-26 RX ADMIN — ONDANSETRON 8 MG: 4 TABLET, ORALLY DISINTEGRATING ORAL at 22:22

## 2022-02-26 RX ADMIN — ACETAMINOPHEN 650 MG: 325 SOLUTION ORAL at 04:02

## 2022-02-26 RX ADMIN — IOPAMIDOL 64 ML: 755 INJECTION, SOLUTION INTRAVENOUS at 13:09

## 2022-02-26 RX ADMIN — ONDANSETRON 8 MG: 4 TABLET, ORALLY DISINTEGRATING ORAL at 04:08

## 2022-02-26 RX ADMIN — SODIUM CHLORIDE, POTASSIUM CHLORIDE, SODIUM LACTATE AND CALCIUM CHLORIDE 1000 ML: 600; 310; 30; 20 INJECTION, SOLUTION INTRAVENOUS at 16:10

## 2022-02-26 RX ADMIN — ONDANSETRON 8 MG: 4 TABLET, ORALLY DISINTEGRATING ORAL at 14:05

## 2022-02-26 RX ADMIN — CARVEDILOL 12.5 MG: 12.5 TABLET, FILM COATED ORAL at 17:47

## 2022-02-26 RX ADMIN — SODIUM CHLORIDE, POTASSIUM CHLORIDE, SODIUM LACTATE AND CALCIUM CHLORIDE 1000 ML: 600; 310; 30; 20 INJECTION, SOLUTION INTRAVENOUS at 08:37

## 2022-02-26 RX ADMIN — LORAZEPAM 1 MG: 1 TABLET ORAL at 22:17

## 2022-02-26 RX ADMIN — HEPARIN SODIUM 1900 UNITS/HR: 1000 INJECTION INTRAVENOUS; SUBCUTANEOUS at 08:18

## 2022-02-26 RX ADMIN — HEPARIN SODIUM 1900 UNITS/HR: 1000 INJECTION INTRAVENOUS; SUBCUTANEOUS at 22:11

## 2022-02-26 RX ADMIN — OXYCODONE HYDROCHLORIDE 10 MG: 5 SOLUTION ORAL at 14:05

## 2022-02-26 RX ADMIN — MAGNESIUM SULFATE HEPTAHYDRATE: 500 INJECTION, SOLUTION INTRAMUSCULAR; INTRAVENOUS at 22:13

## 2022-02-26 ASSESSMENT — ACTIVITIES OF DAILY LIVING (ADL)
ADLS_ACUITY_SCORE: 5

## 2022-02-26 NOTE — PLAN OF CARE
Goal Outcome Evaluation:    Plan of Care Reviewed With: patient     Overall Patient Progress: no change    Outcome Evaluation: Patient frequently ambulates in the room and hallway. Pain controlled with PRN pain meds with some relief.    Alert and oriented x 4, Calm and cooperative. PEG tube Clamped. Patient independently drains PEG tube. No change on heparin drip, Next heparin 10a level check is tomorrow. Patient went to CT via wheelchair at 13:05 and got back at 13:20. LR 1,000 Bolus given infusing 125ml/hr. Patient Left Lower PIV Removed Due to infiltration and patient request. Tele discontinued. Will continue to monitor and follow POC.

## 2022-02-26 NOTE — PROGRESS NOTES
[facility]    Progress Note - Medicine Service, MAROON TEAM 5       Date of Admission:  2/23/2022    Assessment & Plan            Parker Acevedo is a 51 year old male with PMHx Celestino-en-Y gastric bypass surgery (2003) c/b short gut syndrome with bowel dysmotility and malabsorption requiring TPN, recurrent bacteremia/line infections, chronic nausea/vomitting, and chronic pain admitted on 2/23/2022 with extensive RUE DVT. Ultrasound showed venous thrombosis in right subclavian, extending from proximal axillary vein to R internal jugular and brachycephalic vein. On exam there is noticeable swelling in right arm, some tenderness to palpation, and some notable weakness in right hand. Mr. Acevedo was started on heparin drip and IR consulted to see if any invasive procedure would be beneficial. Plan to continue heparin and obtain serial US to assess clot burden. CT PE today to assess increased dyspnea on exertion.    Today  - Two 1L LR boluses today for fluid rehydration  - Heparin gtt  - CT PE today  - Hematology consult to discuss appropriateness of transition to DOAC    RUE DVT   Right arm swelling  Increased Dyspnea on exertion  Most likely that extensive DVT is associated with Cm catheter. IR consulted and okay to continue using catheter for TPN. Will continue heparin gtt and perform serial US. CT PE today to assess for pulmonary embolism given increased dyspnea in setting of known DVT. Mr. Acevedo notes that he sometimes does not absorb oral medications as well given his short gut syndrome and this will be considered when deciding home anticoagulation.  - heparin gtt  - CT PE today  - IR consulted, appreciate recomendations  - Okay to use Cm catheter for TPN     Leukocytosis - Resolved  Elevated lactate - Resolved  Pt presented with mild leukocytosis 14.2 and mild lactate elevation to 2.3. Lactate normalized to 1.0 with administration of IVF and most  recent WBC 9.3. Most likely due to inflammation/stress from acute DVT. Infection is less likely without fevers, chills, dysuria, open wounds or erythema, or other obvious source of infection. CRP <2.9. UA unremarkable besides urobilinogen 3.  - f/u blood cultures     Hx gm-en-y gastric bypass c/b short gut syndrome  chronic malnutrition on TPN   IR okay with using Cm catheter for TPN.   - nutrition consult placed for TPN     Acute on chronic nausea  Acute on chronic abdominal pain  Pt noticing increased episodes of vomiting and abdominal cramping elevated from baseline on day of presentation. He is chronically on zofran for nausea and vomiting. Abdominal x-ray without signs of bowel dilation. EKG with QTc of 415 and okay to give Compazine if needed. Nausea and vomiting have been under control while in hospital.  - IV, PO zofran PRN (home med)  - IV, PO compazine PRN   - PTA sucralfate     Chronic Pain  Home pain regimen appears to be fentanyl patch 25 mcg/hr every 48 hours and oxycodone 5-10 mg TID PRN. Has been controlled okay in hospital so far.  - Oxycodone liquid 5-10 mg QID PRN while inpatient  - Fentanyl patch q48H   - cont PTA miralax daily      ADHD  -PTA adderall 20mg daily     HTN  - PTA carvedilol 12.5mg BID       Diet: Regular Diet Adult  parenteral nutrition - ADULT compounded formula CYCLE    DVT Prophylaxis: On systemic heparin  Sanchez Catheter: Not present  Fluids: None  Central Lines: PRESENT  CVC Double Lumen Right Internal jugular Valved;Tunneled-Site Assessment: WDL  Cardiac Monitoring: ACTIVE order. Indication: Large DVT on central line  Code Status: Full Code      Disposition Plan   Discharge in 1-3 days   IV Medication - consider oral or Home Infusion   Discharge pending CT PE results and plan for home anticoagulation.     The patient's care was discussed with the Attending Physician, Dr. Wu.    Koko Mathur, MS4  Medical Student  Medicine Service, [address]  [facility]  Securely message with the [employer] Web Console (learn more here)  Text page via [facility] Paging/Directory   Please see signed in provider for up to date coverage information    Resident/Fellow Attestation   I, Xena Rubio, was present with the medical/JACQUI student who participated in the service and in the documentation of the note.  I have verified the history and personally performed the physical exam and medical decision making.  I agree with the assessment and plan of care as documented in the note.        Xena Rubio MD  PGY3  Date of Service (when I saw the patient): 02/26/22      ______________________________________________________________________    Interval History   No acute overnight events. Has had some increased dyspnea while walking around the hospital. He has noticed it especially on stairs. He has some baseline orthopnea, PND, and dyspnea on exertion and has received outpatient management of heart failure in the past. The pain and swelling in RUE is the same as yesterday. Still has some weakness. He has also notcied some lightheadedness that has increased since being in the hospital. Nausea/vomiting and chronic pain have been controlled in hospital. He is wondering about getting pain medications every 4-6 hours.    Data reviewed today: I reviewed all medications, new labs and imaging results over the last 24 hours. I personally reviewed no images or EKG's today.    Physical Exam   Vital Signs: Temp: 98.7  F (37.1  C) Temp src: Oral BP: 106/63 Pulse: 79   Resp: 20 SpO2: 99 % O2 Device: None (Room air)    Weight: 187 lbs 3.2 oz  Physical Exam  Constitutional:       General: He is not in acute distress.     Appearance: He is not toxic-appearing.   HENT:      Head: Normocephalic and atraumatic.      Right Ear: External ear normal.      Left Ear: External ear normal.      Nose: Nose normal.   Eyes:      Extraocular Movements:  Extraocular movements intact.      Conjunctiva/sclera: Conjunctivae normal.   Cardiovascular:      Rate and Rhythm: Normal rate and regular rhythm.      Heart sounds: Normal heart sounds. No murmur heard.    No friction rub. No gallop.   Pulmonary:      Effort: Pulmonary effort is normal.      Breath sounds: Normal breath sounds. No wheezing, rhonchi or rales.   Abdominal:      Palpations: Abdomen is soft.      Tenderness: There is no abdominal tenderness. There is no guarding or rebound.   Musculoskeletal:         General: Swelling (RUE - unchanged) and tenderness (RUE - mostly pain palpation of biceps muscle) present.      Cervical back: Rigidity (Right sided swelling and pain with movement) and tenderness (Right sided tenderness) present.   Skin:     General: Skin is warm.      Findings: No erythema or rash.   Neurological:      Mental Status: He is alert and oriented to person, place, and time.      Motor: Weakness (Right arm weakness. (4/5 hand strength, 5/5 Biceps strength)) present.   Psychiatric:         Mood and Affect: Mood normal.         Behavior: Behavior normal.         Thought Content: Thought content normal.         Judgment: Judgment normal.       Data   Recent Labs   Lab 02/26/22  1031 02/26/22  0542 02/26/22  0412 02/25/22  1616 02/25/22  1054 02/24/22  1809 02/23/22  2207 02/23/22  1621   WBC  --  6.4  --   --  9.3  --  14.2* 10.4   HGB  --  10.7*  --   --  11.6*  --  13.1* 13.7   MCV  --  96  --   --  94  --  91 92   PLT  --  191  --   --  196  --  209 198   INR  --   --   --   --  1.04  --  1.05  --    NA  --  141  --   --  138  --  139 140   POTASSIUM  --  3.9  --   --  4.2  --  3.5 4.0   CHLORIDE  --  107  --   --  105  --  108 109   CO2  --  30  --   --  30  --  23 29   BUN  --  18  --   --  19  --  14 12   CR  --  0.90  --   --  0.83  --  0.78 0.81   ANIONGAP  --  4  --   --  3  --  8 2*   SILAS  --  8.4*  --   --  8.6  --  8.4* 8.9   GLC 94 105* 133*   < > 96   < > 59* 99   ALBUMIN  --   --    --   --  3.1*  --   --  3.5   PROTTOTAL  --   --   --   --  6.6*  --   --  7.4   BILITOTAL  --   --   --   --  0.9  --   --  0.7   ALKPHOS  --   --   --   --  76  --   --  85   ALT  --   --   --   --  32  --   --  44   AST  --   --   --   --  18  --   --  28    < > = values in this interval not displayed.     Recent Results (from the past 24 hour(s))   US Upper Extremity Venous Duplex Right    Narrative    EXAMINATION: US UPPER EXTREMITY VENOUS DUPLEX RIGHT  2/25/2022 12:30  PM      CLINICAL HISTORY: recheck RUE DVT    COMPARISON: US: 2/23/2022        PROCEDURE COMMENTS: Ultrasound was performed of the deep venous system  of the right upper extremity using grayscale, color, and spectral  Doppler.    FINDINGS:    The right internal jugular vein, medial subclavian vein are not  compressible with thrombus appreciated. Additionally, there is  extension into the innominate vein.     The axillary, basilic and cephalic veins are visualized and are  patent. Venous waveforms are normal. There is normal response to  compression.      Impression    IMPRESSION:    1. Occlusive thrombus in the right internal jugular vein and median  subclavian, with extension into the innominate vein.  2. Previously seen axillary and cephalic vein thrombus is not  visualized on today's exam.    I have personally reviewed the examination and initial interpretation  and I agree with the findings.    MANUEL CALDWELL MD         SYSTEM ID:  [id number]

## 2022-02-26 NOTE — PLAN OF CARE
Goal Outcome Evaluation:    Plan of Care Reviewed With: patient     Overall Patient Progress: improving    Outcome Evaluation: UA collected, unremarkable except for slightly elevated urobiligen. Heparin orders swtiched to monitor 10a instead of PTT. Second therapeutic level achieved, recheck tomorrow AM. Pt complaining of RUE pain and this jill c/o intermittent palpitations, team informed and added conditional EKG to release when patient next feels palpitations. RUE edematous, slightly more erythematous than LUE, intermittent numbness and tingling, pulses +2 and equal to both sides. Pending release. Wife present at bedside for support. Pt frequently ambulating in room and hallway. Pain controlled with prns, patient states tolerable for him. DVT education reinforced. Pt really wanting to be discharged before Monday. Seeming anxious to discharge. Also requested to have prn seroquel available for insomnia, MD newton paged. Plan to page again tonight if patient cannot sleep, not adding any prn seroquel at this time.

## 2022-02-26 NOTE — PLAN OF CARE
Goal Outcome Evaluation:    Plan of Care Reviewed With: patient     Overall Patient Progress: improving    Alert and oriented x 4. Up independently in the hallway. Ongoing Heparin drip at 1900 units /hr. Latest Heparin 10a is 0.45 within therapeutic range. Complained of nausea once this shift, ODT Zofran given with relief. Complained of abdominal pain with relief from prn pain meds. PEG tube clamped. Pt vented and drained PEG independently to help with nausea. On continuous tele monitor, NSR intermittently tachycardic. Vital signs stable on room air. Pt is hoping to discharge today. Will continue to monitor and follow plan of care

## 2022-02-27 LAB
ANION GAP SERPL CALCULATED.3IONS-SCNC: 3 MMOL/L (ref 3–14)
BUN SERPL-MCNC: 18 MG/DL (ref 7–30)
CALCIUM SERPL-MCNC: 8.3 MG/DL (ref 8.5–10.1)
CHLORIDE BLD-SCNC: 104 MMOL/L (ref 94–109)
CO2 SERPL-SCNC: 29 MMOL/L (ref 20–32)
CREAT SERPL-MCNC: 0.77 MG/DL (ref 0.66–1.25)
CREAT SERPL-MCNC: 0.78 MG/DL (ref 0.66–1.25)
ERYTHROCYTE [DISTWIDTH] IN BLOOD BY AUTOMATED COUNT: 15 % (ref 10–15)
FERRITIN SERPL-MCNC: 28 NG/ML (ref 26–388)
FOLATE SERPL-MCNC: 15.3 NG/ML
GFR SERPL CREATININE-BSD FRML MDRD: >90 ML/MIN/1.73M2
GFR SERPL CREATININE-BSD FRML MDRD: >90 ML/MIN/1.73M2
GLUCOSE BLD-MCNC: 444 MG/DL (ref 70–99)
GLUCOSE BLDC GLUCOMTR-MCNC: 106 MG/DL (ref 70–99)
GLUCOSE BLDC GLUCOMTR-MCNC: 117 MG/DL (ref 70–99)
GLUCOSE BLDC GLUCOMTR-MCNC: 86 MG/DL (ref 70–99)
GLUCOSE BLDC GLUCOMTR-MCNC: 96 MG/DL (ref 70–99)
HCT VFR BLD AUTO: 32 % (ref 40–53)
HGB BLD-MCNC: 10.6 G/DL (ref 13.3–17.7)
HGB BLD-MCNC: 9.8 G/DL (ref 13.3–17.7)
IRON SATN MFR SERPL: 27 % (ref 15–46)
IRON SERPL-MCNC: 88 UG/DL (ref 35–180)
MAGNESIUM SERPL-MCNC: 2.5 MG/DL (ref 1.6–2.3)
MCH RBC QN AUTO: 30 PG (ref 26.5–33)
MCHC RBC AUTO-ENTMCNC: 30.6 G/DL (ref 31.5–36.5)
MCV RBC AUTO: 98 FL (ref 78–100)
PHOSPHATE SERPL-MCNC: 4.5 MG/DL (ref 2.5–4.5)
PLATELET # BLD AUTO: 160 10E3/UL (ref 150–450)
POTASSIUM BLD-SCNC: 4.4 MMOL/L (ref 3.4–5.3)
POTASSIUM BLD-SCNC: 5.8 MMOL/L (ref 3.4–5.3)
RBC # BLD AUTO: 3.27 10E6/UL (ref 4.4–5.9)
SODIUM SERPL-SCNC: 136 MMOL/L (ref 133–144)
TIBC SERPL-MCNC: 325 UG/DL (ref 240–430)
TRIGL SERPL-MCNC: 46 MG/DL
UFH PPP CHRO-ACNC: 0.85 IU/ML
VIT B12 SERPL-MCNC: 319 PG/ML (ref 193–986)
WBC # BLD AUTO: 5 10E3/UL (ref 4–11)

## 2022-02-27 PROCEDURE — 99232 SBSQ HOSP IP/OBS MODERATE 35: CPT | Performed by: STUDENT IN AN ORGANIZED HEALTH CARE EDUCATION/TRAINING PROGRAM

## 2022-02-27 PROCEDURE — 250N000011 HC RX IP 250 OP 636: Performed by: STUDENT IN AN ORGANIZED HEALTH CARE EDUCATION/TRAINING PROGRAM

## 2022-02-27 PROCEDURE — 82607 VITAMIN B-12: CPT | Performed by: INTERNAL MEDICINE

## 2022-02-27 PROCEDURE — 84134 ASSAY OF PREALBUMIN: CPT | Performed by: STUDENT IN AN ORGANIZED HEALTH CARE EDUCATION/TRAINING PROGRAM

## 2022-02-27 PROCEDURE — 85027 COMPLETE CBC AUTOMATED: CPT | Performed by: STUDENT IN AN ORGANIZED HEALTH CARE EDUCATION/TRAINING PROGRAM

## 2022-02-27 PROCEDURE — 250N000013 HC RX MED GY IP 250 OP 250 PS 637

## 2022-02-27 PROCEDURE — 83550 IRON BINDING TEST: CPT | Performed by: INTERNAL MEDICINE

## 2022-02-27 PROCEDURE — 82728 ASSAY OF FERRITIN: CPT | Performed by: INTERNAL MEDICINE

## 2022-02-27 PROCEDURE — 250N000009 HC RX 250: Performed by: STUDENT IN AN ORGANIZED HEALTH CARE EDUCATION/TRAINING PROGRAM

## 2022-02-27 PROCEDURE — 84478 ASSAY OF TRIGLYCERIDES: CPT | Performed by: STUDENT IN AN ORGANIZED HEALTH CARE EDUCATION/TRAINING PROGRAM

## 2022-02-27 PROCEDURE — 84100 ASSAY OF PHOSPHORUS: CPT | Performed by: STUDENT IN AN ORGANIZED HEALTH CARE EDUCATION/TRAINING PROGRAM

## 2022-02-27 PROCEDURE — 99207 PR CDG-MDM COMPONENT: MEETS MODERATE - DOWN CODED: CPT | Performed by: STUDENT IN AN ORGANIZED HEALTH CARE EDUCATION/TRAINING PROGRAM

## 2022-02-27 PROCEDURE — 258N000003 HC RX IP 258 OP 636: Performed by: STUDENT IN AN ORGANIZED HEALTH CARE EDUCATION/TRAINING PROGRAM

## 2022-02-27 PROCEDURE — 250N000011 HC RX IP 250 OP 636

## 2022-02-27 PROCEDURE — 250N000013 HC RX MED GY IP 250 OP 250 PS 637: Performed by: STUDENT IN AN ORGANIZED HEALTH CARE EDUCATION/TRAINING PROGRAM

## 2022-02-27 PROCEDURE — 80053 COMPREHEN METABOLIC PANEL: CPT | Performed by: STUDENT IN AN ORGANIZED HEALTH CARE EDUCATION/TRAINING PROGRAM

## 2022-02-27 PROCEDURE — 84132 ASSAY OF SERUM POTASSIUM: CPT | Performed by: STUDENT IN AN ORGANIZED HEALTH CARE EDUCATION/TRAINING PROGRAM

## 2022-02-27 PROCEDURE — 85018 HEMOGLOBIN: CPT | Performed by: STUDENT IN AN ORGANIZED HEALTH CARE EDUCATION/TRAINING PROGRAM

## 2022-02-27 PROCEDURE — 120N000002 HC R&B MED SURG/OB UMMC

## 2022-02-27 PROCEDURE — 36592 COLLECT BLOOD FROM PICC: CPT | Performed by: STUDENT IN AN ORGANIZED HEALTH CARE EDUCATION/TRAINING PROGRAM

## 2022-02-27 PROCEDURE — 82746 ASSAY OF FOLIC ACID SERUM: CPT | Performed by: INTERNAL MEDICINE

## 2022-02-27 PROCEDURE — 83735 ASSAY OF MAGNESIUM: CPT | Performed by: STUDENT IN AN ORGANIZED HEALTH CARE EDUCATION/TRAINING PROGRAM

## 2022-02-27 PROCEDURE — 85520 HEPARIN ASSAY: CPT | Performed by: STUDENT IN AN ORGANIZED HEALTH CARE EDUCATION/TRAINING PROGRAM

## 2022-02-27 PROCEDURE — 99223 1ST HOSP IP/OBS HIGH 75: CPT | Mod: GC | Performed by: INTERNAL MEDICINE

## 2022-02-27 PROCEDURE — 82565 ASSAY OF CREATININE: CPT | Performed by: STUDENT IN AN ORGANIZED HEALTH CARE EDUCATION/TRAINING PROGRAM

## 2022-02-27 RX ORDER — METHYLPREDNISOLONE SODIUM SUCCINATE 125 MG/2ML
125 INJECTION, POWDER, LYOPHILIZED, FOR SOLUTION INTRAMUSCULAR; INTRAVENOUS
Status: DISCONTINUED | OUTPATIENT
Start: 2022-02-27 | End: 2022-02-28 | Stop reason: HOSPADM

## 2022-02-27 RX ORDER — DIPHENHYDRAMINE HYDROCHLORIDE 50 MG/ML
50 INJECTION INTRAMUSCULAR; INTRAVENOUS
Status: DISCONTINUED | OUTPATIENT
Start: 2022-02-27 | End: 2022-02-28 | Stop reason: HOSPADM

## 2022-02-27 RX ADMIN — ENOXAPARIN SODIUM 80 MG: 80 INJECTION SUBCUTANEOUS at 21:10

## 2022-02-27 RX ADMIN — OXYCODONE HYDROCHLORIDE 10 MG: 5 SOLUTION ORAL at 04:03

## 2022-02-27 RX ADMIN — SODIUM CHLORIDE 25 MG: 9 INJECTION, SOLUTION INTRAVENOUS at 16:35

## 2022-02-27 RX ADMIN — PANTOPRAZOLE SODIUM 40 MG: 40 TABLET, DELAYED RELEASE ORAL at 08:55

## 2022-02-27 RX ADMIN — LORAZEPAM 1 MG: 1 TABLET ORAL at 22:22

## 2022-02-27 RX ADMIN — ONDANSETRON 8 MG: 4 TABLET, ORALLY DISINTEGRATING ORAL at 10:25

## 2022-02-27 RX ADMIN — OXYCODONE HYDROCHLORIDE 10 MG: 5 SOLUTION ORAL at 10:25

## 2022-02-27 RX ADMIN — CARVEDILOL 12.5 MG: 12.5 TABLET, FILM COATED ORAL at 17:51

## 2022-02-27 RX ADMIN — ONDANSETRON 8 MG: 4 TABLET, ORALLY DISINTEGRATING ORAL at 22:22

## 2022-02-27 RX ADMIN — PROCHLORPERAZINE EDISYLATE 10 MG: 5 INJECTION INTRAMUSCULAR; INTRAVENOUS at 04:46

## 2022-02-27 RX ADMIN — FENTANYL 1 PATCH: 25 PATCH, EXTENDED RELEASE TRANSDERMAL at 22:20

## 2022-02-27 RX ADMIN — SODIUM CHLORIDE, POTASSIUM CHLORIDE, SODIUM LACTATE AND CALCIUM CHLORIDE 1000 ML: 600; 310; 30; 20 INJECTION, SOLUTION INTRAVENOUS at 10:29

## 2022-02-27 RX ADMIN — SODIUM CHLORIDE 1000 MG: 9 INJECTION, SOLUTION INTRAVENOUS at 16:53

## 2022-02-27 RX ADMIN — CARVEDILOL 12.5 MG: 12.5 TABLET, FILM COATED ORAL at 08:55

## 2022-02-27 RX ADMIN — ENOXAPARIN SODIUM 80 MG: 80 INJECTION SUBCUTANEOUS at 12:54

## 2022-02-27 RX ADMIN — BENZOCAINE AND MENTHOL 1 LOZENGE: 15; 2.6 LOZENGE ORAL at 21:10

## 2022-02-27 RX ADMIN — ACETAMINOPHEN 650 MG: 325 SOLUTION ORAL at 14:01

## 2022-02-27 RX ADMIN — OXYCODONE HYDROCHLORIDE 10 MG: 5 SOLUTION ORAL at 22:22

## 2022-02-27 RX ADMIN — MAGNESIUM SULFATE HEPTAHYDRATE: 500 INJECTION, SOLUTION INTRAMUSCULAR; INTRAVENOUS at 21:13

## 2022-02-27 RX ADMIN — ACETAMINOPHEN 650 MG: 325 SOLUTION ORAL at 04:03

## 2022-02-27 RX ADMIN — ONDANSETRON 8 MG: 4 TABLET, ORALLY DISINTEGRATING ORAL at 04:03

## 2022-02-27 RX ADMIN — DEXTROAMPHETAMINE SACCHARATE, AMPHETAMINE ASPARTATE, DEXTROAMPHETAMINE SULFATE AND AMPHETAMINE SULFATE 20 MG: 2.5; 2.5; 2.5; 2.5 TABLET ORAL at 08:55

## 2022-02-27 RX ADMIN — OXYCODONE HYDROCHLORIDE 10 MG: 5 SOLUTION ORAL at 16:39

## 2022-02-27 ASSESSMENT — ACTIVITIES OF DAILY LIVING (ADL)
ADLS_ACUITY_SCORE: 5

## 2022-02-27 NOTE — PLAN OF CARE
Goal Outcome Evaluation:    Plan of Care Reviewed With: patient     Overall Patient Progress: no change    Outcome Evaluation: A&Ox4. VSS on RA. Calm & cooperative. Pt is independent, ambulates frequently. Regular diet, poor appetite this evening. PEG tube is clamped, pt drains independently for decompressing. Cont heparin drip at 1900 units/hr. TPN running through CVC.  this evening, q6. Complaining of pain & nausea, PRN Oxy & Zofran given. Pt able to make needs known.

## 2022-02-27 NOTE — PLAN OF CARE
Goal Outcome Evaluation:    Plan of Care Reviewed With: patient     Overall Patient Progress: no change    Outcome Evaluation: Patient frequently walks to the bathroom and the hallway. Pain controlled with PRN oxy and tylenol given. Zofran given for nausea. Patient heparin drip discontinued and changed to Lovenox. Alert and oriented x 4, calm and cooperative. LR Bolus infusing at 125ml/hr. Will continue to monitor and follow POC.

## 2022-02-27 NOTE — PROGRESS NOTES
[facility]    Progress Note - Medicine Service, MAROON TEAM 5       Date of Admission:  2/23/2022    Assessment & Plan            Parker Acevedo is a 51 year old male with PMHx Celestino-en-Y gastric bypass surgery (2003) c/b short gut syndrome with bowel dysmotility and malabsorption requiring TPN, recurrent bacteremia/line infections, chronic nausea/vomitting, and chronic pain admitted on 2/23/2022 with extensive RUE DVT. Ultrasound showed venous thrombosis in right subclavian, extending from proximal axillary vein to R internal jugular and brachycephalic vein. On exam there is noticeable swelling in right arm, some tenderness to palpation, and some notable weakness in right hand. Mr. Acevedo was started on heparin drip and IR consulted to see if any invasive procedure would be beneficial. CT PE negative for PE.     Today  - Switch from heparin to enoxaparin, needs 3-6 months.  Likely needs ppx enox long-term as long he has the tunneled line.   - Outpatient heme follow-up in 3 months with repeat US  - measure arm circumference 15cm above and below olecranon today and tomorrow and with PCP outpatient within a week.   - Measure Xa level 4 hours post 3rd dose, tomorrow around 12pm (ordered) and if in range, will plan on discharging tomorrow afternoon with follow-up labs with PCP outpatient within a week.   - IV iron given iron deficiency.  Will need outpatient colonoscopy, potentially once done with therapeutic     RUE DVT   Right arm swelling  Increased Dyspnea on exertion  Most likely that extensive DVT is associated with Cm catheter. IR consulted and okay to continue using catheter for TPN. Will continue heparin gtt and perform serial US. CT PE today to assess for pulmonary embolism given increased dyspnea in setting of known DVT. Mr. Acevedo notes that he sometimes does not absorb oral medications as well given his short gut syndrome and this will be  considered when deciding home anticoagulation.  - heparin gtt  - CT PE today  - IR consulted, appreciate recomendations  - Okay to use Cm catheter for TPN     Leukocytosis - Resolved  Elevated lactate - Resolved  Pt presented with mild leukocytosis 14.2 and mild lactate elevation to 2.3. Lactate normalized to 1.0 with administration of IVF and most recent WBC 9.3. Most likely due to inflammation/stress from acute DVT. Infection is less likely without fevers, chills, dysuria, open wounds or erythema, or other obvious source of infection. CRP <2.9. UA unremarkable besides urobilinogen 3.  - f/u blood cultures     Hx gm-en-y gastric bypass c/b short gut syndrome  chronic malnutrition on TPN   IR okay with using Cm catheter for TPN.   - nutrition consult placed for TPN     Acute on chronic nausea  Acute on chronic abdominal pain  Pt noticing increased episodes of vomiting and abdominal cramping elevated from baseline on day of presentation. He is chronically on zofran for nausea and vomiting. Abdominal x-ray without signs of bowel dilation. EKG with QTc of 415 and okay to give Compazine if needed. Nausea and vomiting have been under control while in hospital.  - IV, PO zofran PRN (home med)  - IV, PO compazine PRN   - PTA sucralfate     Chronic Pain  Home pain regimen appears to be fentanyl patch 25 mcg/hr every 48 hours and oxycodone 5-10 mg TID PRN. Has been controlled okay in hospital so far.  - Oxycodone liquid 5-10 mg QID PRN while inpatient  - Fentanyl patch q48H   - cont PTA miralax daily      ADHD  -PTA adderall 20mg daily     HTN  - PTA carvedilol 12.5mg BID       Diet: Regular Diet Adult  parenteral nutrition - ADULT compounded formula CYCLE  parenteral nutrition - ADULT compounded formula CYCLE    DVT Prophylaxis: On systemic heparin  Sanchez Catheter: Not present  Fluids: None  Central Lines: PRESENT  CVC Double Lumen Right Internal jugular Valved;Tunneled-Site Assessment: WDL  Cardiac Monitoring:  None  Code Status: Full Code      Disposition Plan   Discharge in 1-3 days   IV Medication - consider oral or Home Infusion   Discharge pending CT PE results and plan for home anticoagulation.     The patient's care was discussed with patient, spouse, RN, and heme consultant.    Heber Wu DO, MS   of Medicine  General Internal Medicine  [facility]  Text Page (8am - 5pm)       ______________________________________________________________________    Interval History   No acute overnight events.     R arm still swollen but hand movement is better today.     Data reviewed today: I reviewed all medications, new labs and imaging results over the last 24 hours. I personally reviewed no images or EKG's today.    Physical Exam   Vital Signs: Temp: 99  F (37.2  C) Temp src: Oral BP: 105/68 Pulse: 74   Resp: 18 SpO2: 100 % O2 Device: None (Room air)    Weight: 187 lbs 6.4 oz  Physical Exam  Constitutional:       General: He is not in acute distress.     Appearance: He is not toxic-appearing.   HENT:      Head: Normocephalic and atraumatic.      Right Ear: External ear normal.      Left Ear: External ear normal.      Nose: Nose normal.   Eyes:      Extraocular Movements: Extraocular movements intact.      Conjunctiva/sclera: Conjunctivae normal.   Cardiovascular:      Rate and Rhythm: Normal rate and regular rhythm.      Heart sounds: Normal heart sounds. No murmur heard.    No friction rub. No gallop.   Pulmonary:      Effort: Pulmonary effort is normal.      Breath sounds: Normal breath sounds. No wheezing, rhonchi or rales.   Abdominal:      Palpations: Abdomen is soft.      Tenderness: There is no abdominal tenderness. There is no guarding or rebound.   Musculoskeletal:         General: Swelling (RUE - unchanged) and tenderness (RUE - mostly pain palpation of biceps muscle) present.      Cervical back: Rigidity (Right sided swelling and pain with movement) and tenderness  (Right sided tenderness) present.   Skin:     General: Skin is warm.      Findings: No erythema or rash.   Neurological:      Mental Status: He is alert and oriented to person, place, and time.      Motor: Weakness (Right arm weakness. (4/5 hand strength, 5/5 Biceps strength)) present.   Psychiatric:         Mood and Affect: Mood normal.         Behavior: Behavior normal.         Thought Content: Thought content normal.         Judgment: Judgment normal.       Data   Recent Labs   Lab 02/27/22  1202 02/27/22  1007 02/27/22  0844 02/27/22  0738 02/26/22  1031 02/26/22  0542 02/25/22  1616 02/25/22  1054 02/24/22  1809 02/23/22  2207 02/23/22  1621   WBC  --   --   --  5.0  --  6.4  --  9.3  --  14.2* 10.4   HGB  --  10.6*  --  9.8*  --  10.7*  --  11.6*  --  13.1* 13.7   MCV  --   --   --  98  --  96  --  94  --  91 92   PLT  --   --   --  160  --  191  --  196  --  209 198   INR  --   --   --   --   --   --   --  1.04  --  1.05  --    NA  --   --   --  136  --  141  --  138  --  139 140   POTASSIUM  --  4.4  --  5.8*  --  3.9  --  4.2  --  3.5 4.0   CHLORIDE  --   --   --  104  --  107  --  105  --  108 109   CO2  --   --   --  29  --  30  --  30  --  23 29   BUN  --   --   --  18  --  18  --  19  --  14 12   CR  --  0.78  --  0.77  --  0.90  --  0.83  --  0.78 0.81   ANIONGAP  --   --   --  3  --  4  --  3  --  8 2*   SILAS  --   --   --  8.3*  --  8.4*  --  8.6  --  8.4* 8.9   GLC 96  --  106* 444*   < > 105*   < > 96   < > 59* 99   ALBUMIN  --   --   --   --   --   --   --  3.1*  --   --  3.5   PROTTOTAL  --   --   --   --   --   --   --  6.6*  --   --  7.4   BILITOTAL  --   --   --   --   --   --   --  0.9  --   --  0.7   ALKPHOS  --   --   --   --   --   --   --  76  --   --  85   ALT  --   --   --   --   --   --   --  32  --   --  44   AST  --   --   --   --   --   --   --  18  --   --  28    < > = values in this interval not displayed.     No results found for this or any previous visit (from the past 24  hour(s)).

## 2022-02-27 NOTE — PLAN OF CARE
Goal Outcome Evaluation:    Plan of Care Reviewed With: patient     Overall Patient Progress: no change    Outcome Evaluation: Alert and oriented x 4. Ambulates independently in the hallway. Ongoing cycled TPN infusion. Heparin drip at 1900 units/hr. PEG tube in place, drained per gravity by pt for control of nausea. Complained of abdominal pain with relief from po prn pain med. Complained of nausea  With relief from prn Zofran ODT and IV Compazine. Vital signs stable on room air. Will continue to monitor and follow plan of care

## 2022-02-27 NOTE — CONSULTS
"Hematology Consult Note    Date of Service 02/27/2022  Admit Date 2/23/2022   Initial Consult 02/27/22   Hospital Day: 5     Reason for consult: Anticoagulation for extensive RUE DVT in setting of RYGB and short gut syndrome  Consult requested by: Dr. Wu    History of Present Illness  Parker Acevedo is a 51 year old man with a history of Celestino-en-Y gastric bypass, esophageojejunostomy complicated by short gut syndrome, resulting severe malnutrition and malabsorption reliant on TPN, who was admitted 2/23/2022 for an extensive RUE DVT. He initially presented to his PMD Dr. Isaac on 2/23 after 1-2 days of arm swelling. No recent injuries or surgeries, denies recent infection. Ultrasound showed DVT in the R subclavian vein, extending from proximal axillary vein to R internal jugular and brachiocephalic vein. Initially he was started on apixaban and received one dose; however, he was told later that day by his PMD to present to the ED for further evaluation. There was initially some weakness in the right hand. Heparin gtt was started, and confirmed with IR that no interventional procedure needed at this time. CT PE was negative for PE. Hematology consulted regarding anticoagulation plan given his complex medical history.    PMH: As above, also HTN, asthma, ADHD, GERD, recurrent bacteremia/line infections, chronic n/v, chronic pain  PSH: As above, Celestino-en-Y GBP  FamHx: reviewed and noncontributory  SocHx: Still smokes a few cigarettes a day, no etoh since 2002, Is  to wife Rose who is here with him.  Meds reviewed in Epic.  Allergies reviewed in Epic  Comprehensive review of systems performed and otherwise negative unless mentioned above.    Physical Exam  /71   Pulse 71   Temp 98.5  F (36.9  C) (Oral)   Resp 20   Ht 1.803 m (5' 11\")   Wt 84.9 kg (187 lb 3.2 oz)   SpO2 97%   BMI 26.11 kg/m       Constitutional: Awake, alert, cooperative, in NAD.  Eyes: PERRL, EOMI, sclera clear, conjunctiva " normal.  ENT: Normocephalic, without obvious abnormality, oral pharynx with moist mucus membranes  Respiratory: Non-labored breathing, good air exchange, clear to auscultation bilaterally, no crackles or wheezing.  Cardiovascular: RRR, no murmur noted.  GI: + bowel sounds, soft, non-distended, non-tender, no masses palpated, no hepatosplenomegaly.  Skin: No concerning lesions or rash on exposed areas.  Musculoskeletal: RUE with 2+ edema.  Neurologic: Awake, alert & oriented x3.    Psych: appropriate affect    Cm site c/d/i        Recent Labs   Lab 02/27/22  1007 02/27/22  0738   POTASSIUM 4.4 5.8*   CR  --  0.77   SILAS  --  8.3*   MAG  --  2.5*   PHOS  --  4.5     Recent Labs   Lab 02/25/22  1054 02/23/22  1621   AST 18 28   ALT 32 44   ALKPHOS 76 85   BILITOTAL 0.9 0.7       Recent Labs   Lab 02/27/22  1007 02/27/22  0738 02/26/22  0542 02/25/22  1054   WBC  --  5.0 6.4 9.3   HGB 10.6* 9.8* 10.7* 11.6*   HCT  --  32.0* 34.8* 37.3*   MCV  --  98 96 94   PLT  --  160 191 196     No results for input(s): IRON, IRONSAT, RETICABSCT, RETP, FEB, BROOKLYNN, B12, FOLIC, EPOE, MORPH in the last 168 hours.    RUE US 2/23/22  IMPRESSION:   1. Extensive deep venous thrombosis of the right upper extremity  extending from the proximal aspect of the axillary and cephalic veins  into the subclavian vein and up the internal jugular vein as high as  the internal jugular vein could be seen in the neck. This thrombus  also extended into the deep venous structures of the mediastinum,  where seen.  2. No evidence for deep venous thrombosis of the right upper extremity  from the axillary region distally.     RUE US 2/25/22  1. Occlusive thrombus in the right internal jugular vein and median  subclavian, with extension into the innominate vein.  2. Previously seen axillary and cephalic vein thrombus is not  visualized on today's exam.     CT PE 2/26/22  IMPRESSION:  1. No CT evidence of acute pulmonary embolus.  2. Hiatal hernia.  3.  Gastrostomy tube.  4. Cholecystectomy.    Impression:  1. RUE DVT, axillary, cephalic -> subclavian, RIJ, deep venous structures of mediastinum. 2/23/22. Central venous catheter associated, without other clear provoking factors. Stable on US 2/25/22, which is likely expected. Now on IV heparin since 2/23/22 with therapeutic Xa levels.  2. Short gut syndrome with malabsorption and bowel dysmotility on chronic TPN  3. Celestino-en-Y gastric bypass in 2002 with resultant weight loss of 300 lb  4. Mild chronic normocytic anemia, seems a little lower than usual this admission  5. History of iron deficiency (ferritin 14 in 11/16/21), B12 deficiency, and other micronutrient deficiencies secondary to #2 and #3. Last IV iron 1/13/21. Iron deficiency could contribute to thrombosis risk as well.    Given his known dysmotility and malabsorption, absorption of any of the direct oral anticoagulants would also likely to be low. Similarly warfarin may also be hard to manage in the short term because of his GBP and short gut syndrome. At this point would favor using subcutaneous enoxaparin for initial anticoagulation. Could potentially consider warfarin vs. continued enoxaparin in the future for secondary prophylaxis while line is in place.    Recommendations:  - Change from heparin infusion to subcutaneous enoxaparin 1mg/kg q12h  - Would check a LMWH anti-Xa activity level 4 hours after the 3rd or 4th dose, adjust dose as necessary per pharmacy protocol.  - Check right arm circumference, 15 cm above and below olecranon, daily to subjectively monitor  - Continue enoxaparin for minimum 3-6 months, with re-ultrasound at 3 months to see if he would be a candidate for discontinuation  - Secondary prophylaxis should be considered afterward, as his provoking factor (CVC) will likely still be in place. If oral medication is desired, he could be considered for warfarin, or else maintained on a prophylactic dose of enoxaparin.  - Likely should  follow up in hematology clinic after 3 month ultrasound is done.   - Check iron levels and ferritin and B12 and folate (added on), consider IV iron infusion.  - Agree that he needs outpatient colonoscopy for screening for etiologies of iron loss. We scheduled for this in 2 weeks but will likely need to hold off for a few extra months until he can safely hold his anticoagulation before and after procedure.     Thank you for allowing us to participate in this patient's care. Please do not hesitate to contact us if there are any further questions or concerns. We will continue to follow.    Discussed with staff Dr. Stuart.    Sabas Leiva MD  Hematology Fellow  Attending  The patient was seen and examined by me separate from the resident/fellow provider.The note above reflects my assessment and plan. I have personally reviewed today's labs,vital and radiology results. The points of care that were added by me are:    Pt has marked swelling of right arm associated with RUE DVT. Would not use DOAC or VKA with short gut and Celestino Veena. Would need enoxaparin to treat DVT as outlined above  Taiwo Stuart M.D.  [phone]

## 2022-02-28 ENCOUNTER — HOME INFUSION (PRE-WILLOW HOME INFUSION) (OUTPATIENT)
Dept: PHARMACY | Facility: CLINIC | Age: 52
End: 2022-02-28

## 2022-02-28 ENCOUNTER — DOCUMENTATION ONLY (OUTPATIENT)
Dept: ONCOLOGY | Facility: CLINIC | Age: 52
End: 2022-02-28
Payer: COMMERCIAL

## 2022-02-28 ENCOUNTER — HOME INFUSION (PRE-WILLOW HOME INFUSION) (OUTPATIENT)
Dept: PHARMACY | Facility: CLINIC | Age: 52
End: 2022-02-28
Payer: COMMERCIAL

## 2022-02-28 VITALS
WEIGHT: 187.4 LBS | HEIGHT: 71 IN | OXYGEN SATURATION: 99 % | SYSTOLIC BLOOD PRESSURE: 109 MMHG | RESPIRATION RATE: 16 BRPM | BODY MASS INDEX: 26.23 KG/M2 | HEART RATE: 67 BPM | TEMPERATURE: 98.8 F | DIASTOLIC BLOOD PRESSURE: 69 MMHG

## 2022-02-28 PROBLEM — I82.A11 ACUTE DEEP VEIN THROMBOSIS (DVT) OF AXILLARY VEIN OF RIGHT UPPER EXTREMITY (H): Status: ACTIVE | Noted: 2022-02-28

## 2022-02-28 LAB
ALBUMIN SERPL-MCNC: 3.1 G/DL (ref 3.4–5)
ALBUMIN SERPL-MCNC: 3.1 G/DL (ref 3.4–5)
ALP SERPL-CCNC: 63 U/L (ref 40–150)
ALP SERPL-CCNC: 63 U/L (ref 40–150)
ALT SERPL W P-5'-P-CCNC: 23 U/L (ref 0–70)
ALT SERPL W P-5'-P-CCNC: 24 U/L (ref 0–70)
ANION GAP SERPL CALCULATED.3IONS-SCNC: 3 MMOL/L (ref 3–14)
ANION GAP SERPL CALCULATED.3IONS-SCNC: 4 MMOL/L (ref 3–14)
AST SERPL W P-5'-P-CCNC: 16 U/L (ref 0–45)
AST SERPL W P-5'-P-CCNC: 21 U/L (ref 0–45)
BACTERIA BLD CULT: NO GROWTH
BACTERIA BLD CULT: NO GROWTH
BASOPHILS # BLD AUTO: 0 10E3/UL (ref 0–0.2)
BASOPHILS NFR BLD AUTO: 1 %
BILIRUB SERPL-MCNC: 0.4 MG/DL (ref 0.2–1.3)
BILIRUB SERPL-MCNC: 0.6 MG/DL (ref 0.2–1.3)
BUN SERPL-MCNC: 19 MG/DL (ref 7–30)
BUN SERPL-MCNC: 20 MG/DL (ref 7–30)
CALCIUM SERPL-MCNC: 8.4 MG/DL (ref 8.5–10.1)
CALCIUM SERPL-MCNC: 8.5 MG/DL (ref 8.5–10.1)
CHLORIDE BLD-SCNC: 108 MMOL/L (ref 94–109)
CHLORIDE BLD-SCNC: 108 MMOL/L (ref 94–109)
CO2 SERPL-SCNC: 28 MMOL/L (ref 20–32)
CO2 SERPL-SCNC: 29 MMOL/L (ref 20–32)
CREAT SERPL-MCNC: 0.76 MG/DL (ref 0.66–1.25)
CREAT SERPL-MCNC: 0.8 MG/DL (ref 0.66–1.25)
EOSINOPHIL # BLD AUTO: 0.3 10E3/UL (ref 0–0.7)
EOSINOPHIL NFR BLD AUTO: 7 %
ERYTHROCYTE [DISTWIDTH] IN BLOOD BY AUTOMATED COUNT: 15.1 % (ref 10–15)
GFR SERPL CREATININE-BSD FRML MDRD: >90 ML/MIN/1.73M2
GFR SERPL CREATININE-BSD FRML MDRD: >90 ML/MIN/1.73M2
GLUCOSE BLD-MCNC: 114 MG/DL (ref 70–99)
GLUCOSE BLD-MCNC: 93 MG/DL (ref 70–99)
GLUCOSE BLDC GLUCOMTR-MCNC: 106 MG/DL (ref 70–99)
GLUCOSE BLDC GLUCOMTR-MCNC: 87 MG/DL (ref 70–99)
HCT VFR BLD AUTO: 33.2 % (ref 40–53)
HGB BLD-MCNC: 10.3 G/DL (ref 13.3–17.7)
HOLD SPECIMEN: NORMAL
IMM GRANULOCYTES # BLD: 0 10E3/UL
IMM GRANULOCYTES NFR BLD: 0 %
LMWH PPP CHRO-ACNC: 0.79 IU/ML
LYMPHOCYTES # BLD AUTO: 1.7 10E3/UL (ref 0.8–5.3)
LYMPHOCYTES NFR BLD AUTO: 33 %
MAGNESIUM SERPL-MCNC: 2.3 MG/DL (ref 1.6–2.3)
MCH RBC QN AUTO: 29.7 PG (ref 26.5–33)
MCHC RBC AUTO-ENTMCNC: 31 G/DL (ref 31.5–36.5)
MCV RBC AUTO: 96 FL (ref 78–100)
MONOCYTES # BLD AUTO: 0.8 10E3/UL (ref 0–1.3)
MONOCYTES NFR BLD AUTO: 15 %
NEUTROPHILS # BLD AUTO: 2.3 10E3/UL (ref 1.6–8.3)
NEUTROPHILS NFR BLD AUTO: 44 %
NRBC # BLD AUTO: 0 10E3/UL
NRBC BLD AUTO-RTO: 0 /100
PHOSPHATE SERPL-MCNC: 3.6 MG/DL (ref 2.5–4.5)
PHOSPHATE SERPL-MCNC: 3.8 MG/DL (ref 2.5–4.5)
PLATELET # BLD AUTO: 182 10E3/UL (ref 150–450)
POTASSIUM BLD-SCNC: 4.2 MMOL/L (ref 3.4–5.3)
POTASSIUM BLD-SCNC: 4.5 MMOL/L (ref 3.4–5.3)
PREALB SERPL IA-MCNC: 23 MG/DL (ref 15–45)
PROT SERPL-MCNC: 6.4 G/DL (ref 6.8–8.8)
PROT SERPL-MCNC: 6.5 G/DL (ref 6.8–8.8)
RBC # BLD AUTO: 3.47 10E6/UL (ref 4.4–5.9)
SODIUM SERPL-SCNC: 140 MMOL/L (ref 133–144)
SODIUM SERPL-SCNC: 140 MMOL/L (ref 133–144)
WBC # BLD AUTO: 5.2 10E3/UL (ref 4–11)

## 2022-02-28 PROCEDURE — 99239 HOSP IP/OBS DSCHRG MGMT >30: CPT | Mod: GC | Performed by: STUDENT IN AN ORGANIZED HEALTH CARE EDUCATION/TRAINING PROGRAM

## 2022-02-28 PROCEDURE — 250N000013 HC RX MED GY IP 250 OP 250 PS 637: Performed by: STUDENT IN AN ORGANIZED HEALTH CARE EDUCATION/TRAINING PROGRAM

## 2022-02-28 PROCEDURE — 36415 COLL VENOUS BLD VENIPUNCTURE: CPT | Performed by: STUDENT IN AN ORGANIZED HEALTH CARE EDUCATION/TRAINING PROGRAM

## 2022-02-28 PROCEDURE — 258N000003 HC RX IP 258 OP 636: Performed by: STUDENT IN AN ORGANIZED HEALTH CARE EDUCATION/TRAINING PROGRAM

## 2022-02-28 PROCEDURE — 250N000011 HC RX IP 250 OP 636: Performed by: STUDENT IN AN ORGANIZED HEALTH CARE EDUCATION/TRAINING PROGRAM

## 2022-02-28 PROCEDURE — 84100 ASSAY OF PHOSPHORUS: CPT | Performed by: STUDENT IN AN ORGANIZED HEALTH CARE EDUCATION/TRAINING PROGRAM

## 2022-02-28 PROCEDURE — 85520 HEPARIN ASSAY: CPT | Performed by: STUDENT IN AN ORGANIZED HEALTH CARE EDUCATION/TRAINING PROGRAM

## 2022-02-28 PROCEDURE — 83735 ASSAY OF MAGNESIUM: CPT | Performed by: STUDENT IN AN ORGANIZED HEALTH CARE EDUCATION/TRAINING PROGRAM

## 2022-02-28 PROCEDURE — 80053 COMPREHEN METABOLIC PANEL: CPT | Performed by: STUDENT IN AN ORGANIZED HEALTH CARE EDUCATION/TRAINING PROGRAM

## 2022-02-28 PROCEDURE — 250N000013 HC RX MED GY IP 250 OP 250 PS 637

## 2022-02-28 PROCEDURE — 36592 COLLECT BLOOD FROM PICC: CPT | Performed by: STUDENT IN AN ORGANIZED HEALTH CARE EDUCATION/TRAINING PROGRAM

## 2022-02-28 PROCEDURE — 85025 COMPLETE CBC W/AUTO DIFF WBC: CPT | Performed by: STUDENT IN AN ORGANIZED HEALTH CARE EDUCATION/TRAINING PROGRAM

## 2022-02-28 PROCEDURE — 82040 ASSAY OF SERUM ALBUMIN: CPT | Performed by: STUDENT IN AN ORGANIZED HEALTH CARE EDUCATION/TRAINING PROGRAM

## 2022-02-28 PROCEDURE — 99233 SBSQ HOSP IP/OBS HIGH 50: CPT | Mod: GC | Performed by: INTERNAL MEDICINE

## 2022-02-28 RX ORDER — HEPARIN SODIUM,PORCINE 10 UNIT/ML
3 VIAL (ML) INTRAVENOUS ONCE
Status: COMPLETED | OUTPATIENT
Start: 2022-02-28 | End: 2022-02-28

## 2022-02-28 RX ADMIN — ACETAMINOPHEN 650 MG: 325 SOLUTION ORAL at 04:09

## 2022-02-28 RX ADMIN — OXYCODONE HYDROCHLORIDE 5 MG: 5 SOLUTION ORAL at 11:57

## 2022-02-28 RX ADMIN — SODIUM CHLORIDE, POTASSIUM CHLORIDE, SODIUM LACTATE AND CALCIUM CHLORIDE 1000 ML: 600; 310; 30; 20 INJECTION, SOLUTION INTRAVENOUS at 08:02

## 2022-02-28 RX ADMIN — DEXTROAMPHETAMINE SACCHARATE, AMPHETAMINE ASPARTATE, DEXTROAMPHETAMINE SULFATE AND AMPHETAMINE SULFATE 20 MG: 2.5; 2.5; 2.5; 2.5 TABLET ORAL at 08:03

## 2022-02-28 RX ADMIN — CARVEDILOL 12.5 MG: 12.5 TABLET, FILM COATED ORAL at 08:03

## 2022-02-28 RX ADMIN — OXYCODONE HYDROCHLORIDE 5 MG: 5 SOLUTION ORAL at 12:03

## 2022-02-28 RX ADMIN — OXYCODONE HYDROCHLORIDE 10 MG: 5 SOLUTION ORAL at 17:35

## 2022-02-28 RX ADMIN — OXYCODONE HYDROCHLORIDE 5 MG: 5 SOLUTION ORAL at 04:07

## 2022-02-28 RX ADMIN — ONDANSETRON 8 MG: 4 TABLET, ORALLY DISINTEGRATING ORAL at 04:06

## 2022-02-28 RX ADMIN — OXYCODONE HYDROCHLORIDE 5 MG: 5 SOLUTION ORAL at 04:44

## 2022-02-28 RX ADMIN — PANTOPRAZOLE SODIUM 40 MG: 40 TABLET, DELAYED RELEASE ORAL at 08:03

## 2022-02-28 RX ADMIN — Medication 3 ML: at 17:36

## 2022-02-28 RX ADMIN — ONDANSETRON 8 MG: 4 TABLET, ORALLY DISINTEGRATING ORAL at 11:57

## 2022-02-28 RX ADMIN — CARVEDILOL 12.5 MG: 12.5 TABLET, FILM COATED ORAL at 17:35

## 2022-02-28 RX ADMIN — ENOXAPARIN SODIUM 80 MG: 80 INJECTION SUBCUTANEOUS at 08:03

## 2022-02-28 ASSESSMENT — ACTIVITIES OF DAILY LIVING (ADL)
ADLS_ACUITY_SCORE: 5

## 2022-02-28 NOTE — PROGRESS NOTES
Prior Authorization Approval    enoxaparin 80mg/0.8ml soln  Date Initiated: 2/28/2022  Date Completed: 2/28/2022  Prior Auth Type: Quantity Limit                Status: Approved    Effective Date: 01/01/2022 - 02/28/2023  Copay: 194.17     Filling Pharmacy: [facility] - [address]    Insurance: [facility] - Phone [phone] Fax [phone]  ID: [id number]  Case Number: [id number]   Submitted Via: Claude Perea  [facility] Liaison  Ph: [phone] Pager: [phone]

## 2022-02-28 NOTE — PLAN OF CARE
Goal Outcome Evaluation:    Plan of Care Reviewed With: patient, spouse      Pt VSS on room air, Pt will continue Lovenox therapy 2x/daily. Written Enoxaparin Lovenox Booklet and demonstration with spouse. Plan for pt to  Enoxaparin at discharge pharmacy. All belongings gathered by pt. Friend will be picking pt up and transport to [facility]. Pain medication  given to pt prior to discharge for discomfort in transportation.  One more demonstration given right before discharge- spouse receptive to information.

## 2022-02-28 NOTE — PROGRESS NOTES
"Hematology Consult Note    Date of Service 02/28/2022  Admit Date 2/23/2022   Initial Consult 02/27/22   Hospital Day: 6     Reason for consult: Anticoagulation for extensive RUE DVT in setting of RYGB and short gut syndrome  Consult requested by: Dr. Wu    Interval History  Doing well. RUE swelling about the same. No bleeding.    Physical Exam  /69 (BP Location: Left arm)   Pulse 67   Temp 98.7  F (37.1  C) (Oral)   Resp 16   Ht 1.803 m (5' 11\")   Wt 85 kg (187 lb 6.4 oz)   SpO2 97%   BMI 26.14 kg/m       Constitutional: Awake, alert, cooperative, in NAD.  Eyes: PERRL, EOMI, sclera clear, conjunctiva normal.  ENT: Normocephalic, without obvious abnormality, oral pharynx with moist mucus membranes  Respiratory: Non-labored breathing, good air exchange, clear to auscultation bilaterally, no crackles or wheezing.  Cardiovascular: RRR, no murmur noted.  GI: + bowel sounds, soft, non-distended, non-tender, no masses palpated, no hepatosplenomegaly.  Skin: No concerning lesions or rash on exposed areas.  Musculoskeletal: RUE with 2+ edema.  Neurologic: Awake, alert & oriented x3.  Walking around unit with wife.  Psych: appropriate affect    Cm site c/d/i          Recent Labs   Lab 02/28/22  0646   POTASSIUM 4.2   CR 0.76   SILAS 8.5   MAG 2.3   PHOS 3.6     Recent Labs   Lab 02/28/22  0646 02/27/22  1007 02/25/22  1054 02/23/22  1621   AST 21 16 18 28   ALT 23 24 32 44   ALKPHOS 63 63 76 85   BILITOTAL 0.4 0.6 0.9 0.7       Recent Labs   Lab 02/28/22  0646 02/27/22  1007 02/27/22  0738 02/26/22  0542   WBC 5.2  --  5.0 6.4   HGB 10.3* 10.6* 9.8* 10.7*   HCT 33.2*  --  32.0* 34.8*   MCV 96  --  98 96     --  160 191     Recent Labs   Lab 02/27/22  1007   IRON 88   IRONSAT 27      BROOKLYNN 28   B12 319   FOLIC 15.3       RUE US 2/23/22  IMPRESSION:   1. Extensive deep venous thrombosis of the right upper extremity  extending from the proximal aspect of the axillary and cephalic veins  into the " subclavian vein and up the internal jugular vein as high as  the internal jugular vein could be seen in the neck. This thrombus  also extended into the deep venous structures of the mediastinum,  where seen.  2. No evidence for deep venous thrombosis of the right upper extremity  from the axillary region distally.     RUE US 2/25/22  1. Occlusive thrombus in the right internal jugular vein and median  subclavian, with extension into the innominate vein.  2. Previously seen axillary and cephalic vein thrombus is not  visualized on today's exam.     CT PE 2/26/22  IMPRESSION:  1. No CT evidence of acute pulmonary embolus.  2. Hiatal hernia.  3. Gastrostomy tube.  4. Cholecystectomy.    Impression:  Parker Acevedo is a 51 year old man with a history of Celestino-en-Y gastric bypass, esophageojejunostomy complicated by short gut syndrome, resulting severe malnutrition and malabsorption reliant on TPN, who was admitted 2/23/2022 for an extensive RUE DVT.     1. RUE DVT, axillary, cephalic -> subclavian, RIJ, deep venous structures of mediastinum. 2/23/22. Central venous catheter associated, without other clear provoking factors. Stable on US 2/25/22, which is likely expected. Now on IV heparin since 2/23/22 with therapeutic Xa levels.  2. Short gut syndrome with malabsorption and bowel dysmotility on chronic TPN  3. Celestino-en-Y gastric bypass in 2002 with resultant weight loss of 300 lb  4. Mild chronic normocytic anemia, seems a little lower than usual this admission  5. History of iron deficiency (ferritin 14 in 11/16/21), B12 deficiency, and other micronutrient deficiencies secondary to #2 and #3. Last IV iron 1/13/21. Iron deficiency could contribute to thrombosis risk as well. Still iron deficient (ferritin 28). B12 is borderline (319), folate is normal.    Given his known dysmotility and malabsorption, absorption of any of the direct oral anticoagulants would also likely to be low. Similarly warfarin may also be hard  to manage in the short term because of his GBP and short gut syndrome. At this point would favor using subcutaneous enoxaparin for initial anticoagulation. Could potentially consider warfarin vs. continued enoxaparin in the future for secondary prophylaxis while line is in place.    Recommendations:  - Continue subcutaneous enoxaparin 1mg/kg q12h  - LMWH anti-Xa activity level 4 hours after the 3rd or 4th dose, timed for 1230 today, adjust dose as necessary per pharmacy protocol. Likely discharge on that dose.  - Lymphedema eval for wraps for his RUE.  - Continue enoxaparin for minimum 3-6 months, with re-ultrasound at 3 months to see if he would be a candidate for discontinuation  - Secondary prophylaxis should be considered afterward, as his provoking factor (CVC) will likely still be in place. If oral medication is desired, he could be considered for warfarin, or else maintained on a prophylactic dose of enoxaparin. Can call hematology at that time to discuss.  - IV iron dextran infusion given yesterday  - Agree that he needs outpatient colonoscopy for screening for etiologies of iron loss. He is scheduled for this in 2 weeks but will likely need to hold off for a few extra months until he can safely hold his anticoagulation before and after procedure.     Thank you for allowing us to participate in this patient's care. Please do not hesitate to contact us if there are any further questions or concerns. We will sign off at this time.    Discussed with staff Dr. Stuart.    Sabas Leiva MD  Hematology Fellow  Attending  The patient was seen and examined by me separate from the resident/fellow provider.The note above reflects my assessment and plan. I have personally reviewed today's labs,vital and radiology results. The points of care that were added by me are:    Pt still swelling of right arm associated with RUE DVT. May benefit for arm wrap to decrease swelling.Enxoapainr 1mg/kg/12h for 3 to 6 months. Check anti  Xa Will sign off  Taiwo Stuart M.D.  [phone]

## 2022-02-28 NOTE — PROGRESS NOTES
Lab has been called a few times to get HepXa drawn- main lab states lab will be up after break..... Waiting

## 2022-02-28 NOTE — PLAN OF CARE
Goal Outcome Evaluation:    Plan of Care Reviewed With: patient     Overall Patient Progress: no change    Outcome Evaluation: A&Ox4. VSS on RA. Ambulates independently throughout halls/downstairs. Regular diet, poor appetite this evening. Iron infusion complete. PEG tube clamped, clean/dry/intact. Pt drains PEG tube independently when decompression is needed. Cycled TPN infusing through CVC. R arm measurement completed. Pt complaining of pain, PRN Oxy given. Pt complaining of nausea, PRN Zofran given. Fentanyl patch reapplied this evening.

## 2022-02-28 NOTE — DISCHARGE SUMMARY
[facility]  Discharge Summary - Medicine & Pediatrics       Date of Admission:  2/23/2022  Date of Discharge:  2/28/2022  Discharging Provider:     Discharge Service: Medicine Service, LINA TEAM 5    Discharge Diagnoses   Right Upper Extremity DVT  Hx Celestino-en-y Gastric Bypass c/b short gut syndrome and chronic malnutrition on TPN  Chronic Nausea and Vomiting  Chronic Pain  ADHD  Hx Non-ischemic cardiomyopathy with reduced EF - (Last Echocardiogram LVEF 55-60%)      Follow-ups Needed After Discharge   -Follow up with PCP in 2-3 days for monitoring of LMWH anti-Xa, CBC, and BMP to confirm appropriate anticoagulation dosing and no signs of bleeding.  -Follow-up with PCP in 3 months for repeat US and determine if Lovenox should be continued.  -Colonoscopy in 2-3 months (once anticoagulation can safely be held) to workup chronic anemia and per colon cancer screening guidelines.    Discharge Disposition   Discharged to home  Condition at discharge: Stable    Hospital Course   Parker Acevedo is a 51 year old male with PMHx Celestino-en-Y gastric bypass surgery (2003) c/b short gut syndrome with bowel dysmotility and malabsorption requiring TPN, recurrent bacteremia/line infections, chronic nausea/vomitting, and chronic pain admitted on 2/23/2022 with extensive RUE DVT seen on US. He was started on heparin drip and has had improvement in his right arm swelling, numbness, and weakness throughout hospitalization. IR consulted and felt there is no reason to take out Cm catheter. Hematology consulted and recommends subcutaneous lovenox for 3-6 months instead of DOAC due to patient's problems with malabsorption. He was transitioned to subcutaneous lovenox and tolerated it well before discharge. The following problems were addressed during his hospitalization:    RUE DVT  -Heparin gtt while in hospital and transitioned to subcutaneous Lovenox. Concern for appropriate absorption  of DOAC, per hematology team, thus opted for subcutaneous delivery of anticoagulation.   -Repeat US showed resolution of peripheral clot, but significant central vasculature burden still present.  -Will continue subcutaneous Lovenox for minimum 3 months with repeat US at that time. Per hematology, does not need to follow-up with them unless PCP has further questions.     Hx Celestino-en-y gastric bypass c/b short gut syndrome  Malnutrition  -Delivered TPN through Cm Catheter, per IR, safe to continue using despite clot burden. 1-2 L of LR daily to maintain hydration as per home plan.    Chronic Pain  -Oxycodone given Q6H PRN  And fentanyl patch q48H with adequate pain control    Chronic Nausea/vomiting  -Zofran and Compazine PRN for nausea with adequate control.    Consultations This Hospital Stay   PHARMACY IP CONSULT  PHARMACY IP CONSULT  INTERVENTIONAL RADIOLOGY ADULT/PEDS IP CONSULT  MEDICATION HISTORY IP PHARMACY CONSULT  PHARMACY/NUTRITION TO START AND MANAGE TPN  VASCULAR ACCESS CARE ADULT IP CONSULT  CARE MANAGEMENT / SOCIAL WORK IP CONSULT  VASCULAR ACCESS CARE ADULT IP CONSULT  VASCULAR ACCESS CARE ADULT IP CONSULT  HEMATOLOGY ADULT IP CONSULT  NUTRITION SERVICES ADULT IP CONSULT    Code Status   Full Code       The patient was discussed with Dr. Bryce Rubio MD,  [address] [address]  Phone: [phone]  ______________________________________________________________________    Physical Exam   Vital Signs: Temp: 98.8  F (37.1  C) Temp src: Oral BP: 109/69 Pulse: 67   Resp: 16 SpO2: 99 % O2 Device: None (Room air)    Weight: 187 lbs 6.4 oz  Physical Exam  Constitutional:       General: He is not in acute distress.     Appearance: He is not toxic-appearing.   HENT:      Head: Normocephalic and atraumatic.      Right Ear: External ear normal.      Left Ear: External ear normal.      Nose: Nose normal.   Eyes:       Extraocular Movements: Extraocular movements intact.      Conjunctiva/sclera: Conjunctivae normal.   Cardiovascular:      Rate and Rhythm: Normal rate and regular rhythm.      Heart sounds: Normal heart sounds. No murmur heard.    No friction rub. No gallop.   Pulmonary:      Effort: Pulmonary effort is normal.      Breath sounds: Normal breath sounds. No wheezing, rhonchi or rales.   Abdominal:      Palpations: Abdomen is soft.      Tenderness: There is no abdominal tenderness. There is no guarding or rebound.   Musculoskeletal:         General: Swelling (RUE (improved)) present. No tenderness (No tenderness to palpation today).      Cervical back: Tenderness: Mild right sided tenderness to palpation (improved)   Skin:     General: Skin is warm.      Findings: No erythema or rash.   Neurological:      General: No focal deficit present.      Mental Status: He is alert and oriented to person, place, and time.      Motor: Weakness (Improved Right hand flexion, extension, and  strength) present.   Psychiatric:         Mood and Affect: Mood normal.         Behavior: Behavior normal.         Thought Content: Thought content normal.         Judgment: Judgment normal.           Primary Care Physician   Chuy Isaac    Discharge Orders      Low Molecular Weight Heparin Anti Xa Level     CBC with platelets     Home Infusion Referral      Home Care Referral      Lymphedema Therapy Referral      Reason for your hospital stay    Parker was admitted to the hospital due to extensive blood clot in his right arm that was likely provoked by the presence of his catheter. His body will slowly resorb this clot but he will remain on lovenox injections to prevent further growth of the clot.     Activity    Your activity upon discharge: activity as tolerated     Follow Up and recommended labs and tests    Follow up with primary care provider, Chuy Isaac, within 3-5 days for hospital follow- up.  The following labs/tests are  recommended: LMWH anti Xa and CBC.    You will need a repeat ultrasound of your arm in three months, which your PCP can follow.     Resume Home Care Services     Diet    Follow this diet upon discharge: Orders Placed This Encounter      Regular Diet Adult, Continue home TPN       Significant Results and Procedures   Most Recent 3 CBC's:  Recent Labs   Lab Test 02/28/22  0646 02/27/22  1007 02/27/22  0738 02/26/22  0542   WBC 5.2  --  5.0 6.4   HGB 10.3* 10.6* 9.8* 10.7*   MCV 96  --  98 96     --  160 191     Most Recent 3 BMP's:  Recent Labs   Lab Test 02/28/22  1215 02/28/22  0646 02/28/22  0014 02/27/22  1202 02/27/22  1007 02/27/22  0844 02/27/22  0738   NA  --  140  --   --  140  --  136   POTASSIUM  --  4.2  --   --  4.5  4.4  --  5.8*   CHLORIDE  --  108  --   --  108  --  104   CO2  --  29  --   --  28  --  29   BUN  --  20  --   --  19  --  18   CR  --  0.76  --   --  0.80  0.78  --  0.77   ANIONGAP  --  3  --   --  4  --  3   SILAS  --  8.5  --   --  8.4*  --  8.3*   GLC 87 114* 106*   < > 93   < > 444*    < > = values in this interval not displayed.       Discharge Medications   Current Discharge Medication List      START taking these medications    Details   enoxaparin ANTICOAGULANT (LOVENOX) 80 MG/0.8ML syringe Inject 0.8 mLs (80 mg) Subcutaneous 2 times daily  Qty: 75 mL, Refills: 1    Associated Diagnoses: Acute deep vein thrombosis (DVT) of axillary vein of right upper extremity (H)         CONTINUE these medications which have NOT CHANGED    Details   acetaminophen (TYLENOL) 32 mg/mL liquid Take 15.65 mLs (500 mg) by mouth every 4 hours as needed for fever or mild pain  Qty: 455 mL, Refills: 1    Associated Diagnoses: S/P bariatric surgery      !! albuterol (PROAIR HFA/PROVENTIL HFA/VENTOLIN HFA) 108 (90 Base) MCG/ACT inhaler Inhale 2 puffs into the lungs every 6 hours as needed    Comments: Pharmacy may dispense brand covered by insurance (Proair, or proventil or ventolin or generic  "albuterol inhaler)      amphetamine-dextroamphetamine (ADDERALL) 20 MG tablet TAKE ONE TABLET BY MOUTH ONCE DAILY  Qty: 30 tablet, Refills: 0    Associated Diagnoses: ADHD (attention deficit hyperactivity disorder), inattentive type      carvedilol (COREG) 6.25 MG tablet Take 12.5 mg by mouth 2 times daily (with meals)       cyanocobalamin (CYANOCOBALAMIN) 1000 MCG/ML injection INJECT 1 ML INTO THE MUSCLE EVERY 30 DAYS  Qty: 1 mL, Refills: 3    Associated Diagnoses: Vitamin B12 deficiency (non anemic)      diphenhydrAMINE (BENADRYL) 12.5 MG/5ML syrup Take 25 mg by mouth every 4 hours as needed for itching, allergies or sleep  Qty: 237 mL, Refills: 0    Associated Diagnoses: Insomnia, unspecified type      EPINEPHrine (ANY BX GENERIC EQUIV) 0.3 MG/0.3ML injection 2-pack       !! [facility] MANAGED PATIENT Contact [facility] for patient specific medication information at [phone] on admission and discharge from the hospital.  Phones are answered 24 hours a day 7 days a week 365 days a year.    Providers - Choose \"CONTINUE HOME MED (no script)\" at discharge if patient treatment with home infusion will continue.    Comments: Resume prior to admission TPN/Lipids/saline  Associated Diagnoses: S/P bariatric surgery      fentaNYL (DURAGESIC) 25 mcg/hr 72 hr patch Place 1 patch onto the skin every 48 hours remove old patch. Fill 03/01/22 and start 03/03/22.  Qty: 15 patch, Refills: 0    Associated Diagnoses: Chronic abdominal pain; Chronic pain syndrome      insulin syringe-needle U-100 (29G X 1/2\" 1 ML) 29G X 1/2\" 1 ML miscellaneous Use to inject b12 every 30 days  Qty: 3 each, Refills: 4    Associated Diagnoses: Bariatric surgery status      lactated ringers infusion Inject 1,000-2,000 mLs into the vein daily as needed    Associated Diagnoses: Gastric bypass status for obesity      lidocaine (LIDODERM) 5 % patch Place 1-2 patches onto the skin every 24 hours Wear for 12 hours, remove for 12 " hours.  OK to cut to better fit to size.  Qty: 60 patch, Refills: 6    Associated Diagnoses: Chronic bilateral low back pain without sciatica; Chronic abdominal pain; Myofascial pain      LORazepam (ATIVAN) 1 MG tablet TAKE 1 TABLET (1MG) BY MOUTH AS NEEDED FOR ANXIETY WITH TPN AND MEDICATIONS . JUST ONCE A DAY (30 TO LAST 30 DAYS)  Qty: 30 tablet, Refills: 0    Associated Diagnoses: ADHD (attention deficit hyperactivity disorder), inattentive type      naloxone (NARCAN) 4 MG/0.1ML nasal spray Spray 1 spray (4 mg) into one nostril alternating nostrils once as needed for opioid reversal every 2-3 minutes until assistance arrives  Qty: 0.2 mL, Refills: 0    Associated Diagnoses: Chronic pain syndrome      nystatin (MYCOSTATIN) 049330 UNIT/GM external cream APPLY TOPICALLY 2 TIMES DAILY  Qty: 30 g, Refills: 0    Associated Diagnoses: Yeast infection of the skin      ondansetron (ZOFRAN-ODT) 8 MG ODT tab DISSOLVE ONE TABLET ON TONGUE EVERY 8 HOURS AS NEEDED FOR NAUSEA  Qty: 90 tablet, Refills: 1    Associated Diagnoses: Nausea      oxyCODONE (ROXICODONE) 5 MG/5ML solution Take 5-10 mLs (5-10 mg) by mouth 3 times daily as needed for pain Max of 30mg/day. Put at least 4 hours between doses. Fill 03/01/22 and start 03/03/22. 30 day supply.  Qty: 900 mL, Refills: 0    Associated Diagnoses: Chronic abdominal pain      pantoprazole (PROTONIX) 40 MG EC tablet Take 1 tablet (40 mg) by mouth daily  Qty: 30 tablet, Refills: 5    Associated Diagnoses: S/P bariatric surgery      !! parenteral nutrition - PTA/DISCHARGE ORDER Patient receives 2050 mL TPN every 14 hours through PICC at [facility].      polyethylene glycol (MIRALAX) 17 GM/Dose powder Take 17 g by mouth daily  Qty: 1020 g, Refills: 3    Comments: (2 bottles)  Associated Diagnoses: Myofascial pain; Cervicalgia      sucralfate (CARAFATE) 1 GM/10ML suspension TAKE 10MLS  BY MOUTH FOUR TIMES A DAY AS NEEDED  Qty: 420 mL, Refills: 1    Associated Diagnoses:  Gastric bypass status for obesity      !! VENTOLIN  (90 Base) MCG/ACT inhaler INHALE TWO PUFFS BY MOUTH EVERY 4 HOURS AS NEEDED FOR SHORTNESS OF BREATH /DYSPNEA OR WHEEZING  Qty: 18 g, Refills: 0    Associated Diagnoses: Productive cough      vitamin D2 (ERGOCALCIFEROL) 61186 units (1250 mcg) capsule TAKE 1 CAPSULE (50,000 UNITS) BY MOUTH ONCE A WEEK  Qty: 12 capsule, Refills: 3    Associated Diagnoses: Vitamin D deficiency       !! - Potential duplicate medications found. Please discuss with provider.      STOP taking these medications       Apixaban Starter Pack (ELIQUIS DVT/PE STARTER PACK) 5 MG TBPK Comments:   Reason for Stopping:             Allergies   Allergies   Allergen Reactions     Bactrim [Sulfamethoxazole W/Trimethoprim] Rash     Penicillins Anaphylaxis     Please see Antimicrobial Management Team allergy assessment note 10/10/2018. Patient reported tolerating amoxicillin.     Doxycycline Rash     Vancomycin Rash     Rash after receiving vancomycin 3/28/16 (infusion reaction?). Tolerated with slower infusion and diphenhydramine premed.

## 2022-02-28 NOTE — PLAN OF CARE
Goal Outcome Evaluation:    Plan of Care Reviewed With: patient     Overall Patient Progress: improving    Outcome Evaluation: Alert and oriented x 4. Up independently in the hallway. Ongoing cycled TPN infusion. PEG drained by pt as needed . Complained of nausea nd abdominal pain with relief from prn medications. BG moniotred every 6 hours, oin the 100's. Arm circumference measured daily. Vital signs stable on room air. Pt is hopeful to discharge today. Will continue to monitor and follow plan of care

## 2022-02-28 NOTE — PROGRESS NOTES
This is a recent snapshot of the patient's [facility] medical record.  For current drug dose and complete information and questions, call [phone] or In Basket pool, fv home infusion ([id number])  CSN Number:  [id number]

## 2022-03-01 ENCOUNTER — PATIENT OUTREACH (OUTPATIENT)
Dept: CARE COORDINATION | Facility: CLINIC | Age: 52
End: 2022-03-01
Payer: COMMERCIAL

## 2022-03-01 ENCOUNTER — MYC MEDICAL ADVICE (OUTPATIENT)
Dept: PALLIATIVE MEDICINE | Facility: CLINIC | Age: 52
End: 2022-03-01
Payer: COMMERCIAL

## 2022-03-01 DIAGNOSIS — Z71.89 OTHER SPECIFIED COUNSELING: ICD-10-CM

## 2022-03-01 LAB
BACTERIA BLD CULT: NO GROWTH
PREALB SERPL IA-MCNC: 21 MG/DL (ref 15–45)

## 2022-03-01 NOTE — PROGRESS NOTES
Clinic Care Coordination Contact  [facility]: Post-Discharge Note  SITUATION                                                      Admission:    Admission Date: 03/23/22   Reason for Admission: Right Upper Extremity DVT  Discharge:   Discharge Date: 03/28/22  Discharge Diagnosis: Right Upper Extremity DVT    BACKGROUND                                                      Per hospital discharge summary and inpatient provider notes:    Parker Acevedo is a 51 year old male with PMHx Celestino-en-Y gastric bypass surgery (2003) c/b short gut syndrome with bowel dysmotility and malabsorption requiring TPN, recurrent bacteremia/line infections, chronic nausea/vomitting, and chronic pain admitted on 2/23/2022 with extensive RUE DVT seen on US. He was started on heparin drip and has had improvement in his right arm swelling, numbness, and weakness throughout hospitalization. IR consulted and felt there is no reason to take out Cm catheter. Hematology consulted and recommends subcutaneous lovenox for 3-6 months instead of DOAC due to patient's problems with malabsorption. He was transitioned to subcutaneous lovenox and tolerated it well before discharge. The following problems were addressed during his hospitalization:       ASSESSMENT           Discharge Assessment  How are you doing now that you are home?: Patient's wife reports they are doing well.  No quesions  How are your symptoms? (Red Flag symptoms escalate to triage hotline per guidelines): Improved  Do you feel your condition is stable enough to be safe at home until your provider visit?: Yes  Does the patient have their discharge instructions? : Yes  Does the patient have questions regarding their discharge instructions? : No  Were you started on any new medications or were there changes to any of your previous medications? : Yes  Does the patient have all of their medications?: Yes  Do you have questions regarding any of your medications? : No  Discharge  follow-up appointment scheduled within 14 calendar days? : Yes  Discharge Follow Up Appointment Date: 03/11/22  Discharge Follow Up Appointment Scheduled with?: Primary Care Provider                  PLAN                                                      Outpatient Plan:      -Follow up with PCP in 2-3 days for monitoring of LMWH anti-Xa, CBC, and BMP to confirm appropriate anticoagulation dosing and no signs of bleeding.  -Follow-up with PCP in 3 months for repeat US and determine if Lovenox should be continued.  -Colonoscopy in 2-3 months (once anticoagulation can safely be held) to workup chronic anemia and per colon cancer screening guidelines.    Future Appointments   Date Time Provider Department Center   3/5/2022 10:30 AM PH [facility] LAB [facility]   3/11/2022  1:40 PM Chuy Isaac MD [facility]   4/14/2022 11:30 AM Марина Buckner MD [facility]         For any urgent concerns, please contact our 24 hour nurse triage line: [phone] ([phone])         SANDRO Davila  [phone]  [facility]

## 2022-03-01 NOTE — TELEPHONE ENCOUNTER
From: Parker HALI Curtis      Created: 3/1/2022 12:31 PM        Hey I just have a question I was just in the hospital from February 23,2022 to February 28th 2022 do to blood clots and now I'm on a blood thinner for the rest of my life and I was just wondering if I could go up on my medication or what do you think you can email or we can do a video chat or call me or Rose at 7  thank you so much           First available virtual visit is 03/17/22.      Spoke to Rose and Parker has a DVT of the RUE causing swelling in of the right arm from fingers to neck just below the ear. He is having additional pain with the arm especially at night and is requesting 1 additional 5 mL dose of oxyCODONE (ROXICODONE) 5 MG/5ML solution to be used at night.  Additionally we discussed that Parker should not increase his dose prior to Dr Corado response as the medication she prescribes is not for the pain caused by his arm swelling and she agreed.    Patient was placed on subcutaneous lovenox and will follow up with Dr Isaac scheduled on 03/11/22 to see if there are changes.    Routing to Dr Milligan to review.    Jyothi Winchester, YADIRAN, RN  Care Coordinator  [facility]

## 2022-03-02 ENCOUNTER — MYC REFILL (OUTPATIENT)
Dept: INTERNAL MEDICINE | Facility: CLINIC | Age: 52
End: 2022-03-02
Payer: COMMERCIAL

## 2022-03-02 ENCOUNTER — HOME INFUSION (PRE-WILLOW HOME INFUSION) (OUTPATIENT)
Dept: PHARMACY | Facility: CLINIC | Age: 52
End: 2022-03-02

## 2022-03-02 DIAGNOSIS — F90.0 ADHD (ATTENTION DEFICIT HYPERACTIVITY DISORDER), INATTENTIVE TYPE: ICD-10-CM

## 2022-03-02 NOTE — TELEPHONE ENCOUNTER
Ativan      Last Written Prescription Date:  2/3/2022  Last Fill Quantity: 30,   # refills: 0  Last Office Visit: 2/23/2022  Future Office visit:    Next 5 appointments (look out 90 days)      Mar 05, 2022 10:30 AM  Pre-procedure Covid with PH COVID LAB  [facility] ([facility] ) [address] [place] [id number]  [phone]             Routing refill request to provider for review/approval because:  Drug not on the [facility], [facility] or [facility] refill protocol or controlled substance    Adderall      Last Written Prescription Date:  2/3/2022  Last Fill Quantity: 30,   # refills: 0  Last Office Visit: 2/23/2022  Future Office visit:    Next 5 appointments (look out 90 days)      Mar 05, 2022 10:30 AM  Pre-procedure Covid with PH COVID LAB  [facility] ([facility] ) [address] [place] [id number]  [phone]             Routing refill request to provider for review/approval because:  Drug not on the [facility], [facility] or [facility] refill protocol or controlled substance

## 2022-03-02 NOTE — TELEPHONE ENCOUNTER
I am ok with increasing oxycodone by 5mg at night, x 2 weeks.  Med note placed    Discussed with Rose on the phone.    Jessica Milligan MD  [facility]

## 2022-03-03 ENCOUNTER — TELEPHONE (OUTPATIENT)
Dept: GASTROENTEROLOGY | Facility: CLINIC | Age: 52
End: 2022-03-03
Payer: COMMERCIAL

## 2022-03-03 ENCOUNTER — LAB REQUISITION (OUTPATIENT)
Dept: LAB | Facility: CLINIC | Age: 52
End: 2022-03-03
Payer: COMMERCIAL

## 2022-03-03 ENCOUNTER — TELEPHONE (OUTPATIENT)
Dept: GASTROENTEROLOGY | Facility: CLINIC | Age: 52
End: 2022-03-03

## 2022-03-03 ENCOUNTER — HOME INFUSION (PRE-WILLOW HOME INFUSION) (OUTPATIENT)
Dept: PHARMACY | Facility: CLINIC | Age: 52
End: 2022-03-03

## 2022-03-03 DIAGNOSIS — R78.81 GRAM-POSITIVE BACTEREMIA: Primary | ICD-10-CM

## 2022-03-03 DIAGNOSIS — T80.211D BLOODSTREAM INFECTION DUE TO CENTRAL VENOUS CATHETER, SUBSEQUENT ENCOUNTER: ICD-10-CM

## 2022-03-03 LAB
ALBUMIN SERPL-MCNC: 3.4 G/DL (ref 3.4–5)
ALP SERPL-CCNC: 58 U/L (ref 40–150)
ALT SERPL W P-5'-P-CCNC: 67 U/L (ref 0–70)
ANION GAP SERPL CALCULATED.3IONS-SCNC: 3 MMOL/L (ref 3–14)
AST SERPL W P-5'-P-CCNC: 58 U/L (ref 0–45)
BASOPHILS # BLD AUTO: 0 10E3/UL (ref 0–0.2)
BASOPHILS NFR BLD AUTO: 1 %
BILIRUB SERPL-MCNC: 0.4 MG/DL (ref 0.2–1.3)
BUN SERPL-MCNC: 13 MG/DL (ref 7–30)
CALCIUM SERPL-MCNC: 8.5 MG/DL (ref 8.5–10.1)
CHLORIDE BLD-SCNC: 107 MMOL/L (ref 94–109)
CO2 SERPL-SCNC: 31 MMOL/L (ref 20–32)
CREAT SERPL-MCNC: 0.73 MG/DL (ref 0.66–1.25)
EOSINOPHIL # BLD AUTO: 0.2 10E3/UL (ref 0–0.7)
EOSINOPHIL NFR BLD AUTO: 4 %
ERYTHROCYTE [DISTWIDTH] IN BLOOD BY AUTOMATED COUNT: 15.5 % (ref 10–15)
GFR SERPL CREATININE-BSD FRML MDRD: >90 ML/MIN/1.73M2
GLUCOSE BLD-MCNC: 93 MG/DL (ref 70–99)
HCT VFR BLD AUTO: 35.5 % (ref 40–53)
HGB BLD-MCNC: 11.4 G/DL (ref 13.3–17.7)
IMM GRANULOCYTES # BLD: 0 10E3/UL
IMM GRANULOCYTES NFR BLD: 0 %
LYMPHOCYTES # BLD AUTO: 1.5 10E3/UL (ref 0.8–5.3)
LYMPHOCYTES NFR BLD AUTO: 27 %
MCH RBC QN AUTO: 30.3 PG (ref 26.5–33)
MCHC RBC AUTO-ENTMCNC: 32.1 G/DL (ref 31.5–36.5)
MCV RBC AUTO: 94 FL (ref 78–100)
MONOCYTES # BLD AUTO: 0.7 10E3/UL (ref 0–1.3)
MONOCYTES NFR BLD AUTO: 13 %
NEUTROPHILS # BLD AUTO: 2.9 10E3/UL (ref 1.6–8.3)
NEUTROPHILS NFR BLD AUTO: 55 %
NRBC # BLD AUTO: 0 10E3/UL
NRBC BLD AUTO-RTO: 0 /100
PLATELET # BLD AUTO: 214 10E3/UL (ref 150–450)
POTASSIUM BLD-SCNC: 3.7 MMOL/L (ref 3.4–5.3)
PROT SERPL-MCNC: 6.8 G/DL (ref 6.8–8.8)
RBC # BLD AUTO: 3.76 10E6/UL (ref 4.4–5.9)
SODIUM SERPL-SCNC: 141 MMOL/L (ref 133–144)
UFH PPP CHRO-ACNC: 0.74 IU/ML
WBC # BLD AUTO: 5.3 10E3/UL (ref 4–11)

## 2022-03-03 PROCEDURE — 85520 HEPARIN ASSAY: CPT | Performed by: SURGERY

## 2022-03-03 PROCEDURE — 80053 COMPREHEN METABOLIC PANEL: CPT | Performed by: SURGERY

## 2022-03-03 PROCEDURE — 85025 COMPLETE CBC W/AUTO DIFF WBC: CPT | Performed by: SURGERY

## 2022-03-03 PROCEDURE — 36415 COLL VENOUS BLD VENIPUNCTURE: CPT | Performed by: SURGERY

## 2022-03-03 RX ORDER — LORAZEPAM 1 MG/1
TABLET ORAL
Qty: 30 TABLET | Refills: 0 | Status: SHIPPED | OUTPATIENT
Start: 2022-03-03 | End: 2022-03-21

## 2022-03-03 RX ORDER — BISACODYL 5 MG/1
TABLET, DELAYED RELEASE ORAL
Qty: 2 TABLET | Refills: 0 | Status: SHIPPED | OUTPATIENT
Start: 2022-03-03 | End: 2022-03-03

## 2022-03-03 RX ORDER — DEXTROAMPHETAMINE SACCHARATE, AMPHETAMINE ASPARTATE, DEXTROAMPHETAMINE SULFATE AND AMPHETAMINE SULFATE 5; 5; 5; 5 MG/1; MG/1; MG/1; MG/1
TABLET ORAL
Qty: 30 TABLET | Refills: 0 | Status: SHIPPED | OUTPATIENT
Start: 2022-03-03 | End: 2022-03-21

## 2022-03-03 NOTE — TELEPHONE ENCOUNTER
Upon review patient has cancelled upcoming procedure.    No further action needed at this time. Will discontinue prep script that was ordered.     Jyothi Holloway RN

## 2022-03-03 NOTE — TELEPHONE ENCOUNTER
Patient scheduled for colonoscopy on 3/9/22.     Covid test scheduled: 3/5/22    Arrival time: 0700    Facility location: [facility]    Sedation type: MAC    Indication for procedure: repeat d/t inadequate prep. +strep bovus bacteremia.     Anticoagulations? Lovenox. Recent hospital admit for DVT on 2/23/22. Staff message to Dr. Estrada and IM provider to clarify ok to proceed with procedure and if so, holding interval for lovenox.     Bowel prep recommendation: Extended prep d/t inadequate prep    Extended prep sent to [facility] [place] [place] - [address] [facility] pharmacy. Prep instructions sent via [facility]    Pre visit planning completed.    Jyothi Holloway RN

## 2022-03-03 NOTE — PROGRESS NOTES
This is a recent snapshot of the patient's [facility] medical record.  For current drug dose and complete information and questions, call [phone] or In Basket pool, fv home infusion ([id number])  CSN Number:  [id number]

## 2022-03-03 NOTE — TELEPHONE ENCOUNTER
Caller: VIBHA    Procedure: COLON    Date, Location, and Surgeon of Procedure Cancelled: 3/9/2022    Ordering Provider:    Reason for cancel (please be detailed, any staff messages or encounters to note?): PATIENT JUST GOT OUT OF HOSPITAL FROM DVT, WAS TOLD TO WAIT 3 MONTHS UNTIL THIS IS RESOLVED. PATIENT NEEDS MAC, NO MAC TIME YET FOR June         Rescheduled: NO     If rescheduled:    Date:    Location:    Note any change or update to original order/sedation:

## 2022-03-04 ENCOUNTER — PATIENT OUTREACH (OUTPATIENT)
Dept: GASTROENTEROLOGY | Facility: CLINIC | Age: 52
End: 2022-03-04
Payer: COMMERCIAL

## 2022-03-04 ENCOUNTER — TELEPHONE (OUTPATIENT)
Dept: GASTROENTEROLOGY | Facility: CLINIC | Age: 52
End: 2022-03-04
Payer: COMMERCIAL

## 2022-03-04 NOTE — TELEPHONE ENCOUNTER
Endo Screening Questions  BlueKIND OF PREP RedLOCATION [review exclusion criteria] GreenSEDATION TYPE  1. Are you active on mychart? Y    2. What insurance is in the chart? BCBS     3.  Ordering/Referring Provider: CHAD    4. BMI 26.14 [BMI OVER 40-EXTENDED PREP]  If greater than 40 review exclusion criteria [PAC APPT IF @ [facility]]      5.  Respiratory Screening :  [If yes to any of the following HOSPITAL setting only]     Do you use daily home oxygen? N    Do you have mod to severe Obstructive Sleep Apnea? N  [OKAY @ [facility] SH PH RI]   Do you have Pulmonary Hypertension? N     Do you have UNCONTROLLED asthma? NN      6. Have you had a heart or lung transplant? N      7. Are you currently on dialysis? N [ If yes, G-PREP & HOSPITAL setting only]     8. Do you have chronic kidney disease? N [ If yes, G-PREP ]    9. Have you had a stroke or Transient ischemic attack (TIA) within 6 months? N   (If yes, do not schedule at [facility])    10. In the past 6 months, have you had any heart related issues including cardiomyopathy or heart attack? N        If yes, did it require cardiac stenting or other implantable device? N      11. Do you have any implantable devices in your body (pacemaker, defib, LVAD)? N (If yes, schedule at U)    12. Do you take nitroglycerin? N   If yes, how often? N  (if yes, HOSPITAL setting ONLY)    13. Are you currently taking any blood thinners? YES   [IF YES, INFORM PATIENT TO FOLLOW UP W/ ORDERING PROVIDER FOR BRIDGING INSTRUCTIONS]     14. Are you a diabetic? N   [ If yes, G-PREP ]    15. [FEMALES] Are you currently pregnant? N    If yes, how many weeks? N    16. Are you taking any prescription pain medications on a routine schedule?  Y OXY FET  [ If yes, EXTENDED PREP.]    17. Do you have any chemical dependencies such as alcohol, street drugs, or methadone?  N     18. Do you have any history of post-traumatic stress syndrome, severe anxiety or history of psychosis?  N      19. Do you  transfer independently?  Y    20.  Do you have any issues with constipation?  N  [ If yes, EXTENDED PREP.]    21. Preferred LOCAL Pharmacy for Pre Prescription     [facility] [place] - [address] [facility]  Scheduling Details      Caller : SARA WIFE WAS TRANSFERRED TO WRITER FROM STEPHEN RN  (Please ask for phone number if not scheduled by patient)    Type of Procedure Scheduled: Lower Endoscopy [Colonoscopy]  Which Colonoscopy Prep was Sent?: EXTENDED  KHORUTS CF PATIENTS & GROEN'S PATIENTS NEEDS EXTENDED PREP  Surgeon: CHAD  Date of Procedure: 04/13  [place] [facility]      Sedation Type: MAC  Conscious Sedation- Needs  for 6 hours after the procedure  MAC/General-Needs  for 24 hours after procedure    Pre-op Required at [facility], [place], [place] and OR for MAC sedation: N  (advise patient they will need a pre-op prior to procedure -)      Informed patient they will need an adult  Y  Cannot take any type of public or medical transportation alone    Pre-Procedure Covid test to be completed at [facility] or Externally: PH    Confirmed Nurse will call to complete assessment Y    Additional comments:

## 2022-03-04 NOTE — TELEPHONE ENCOUNTER
Contacted patient and wife answered   Discussed the request for patient to be rescheduled for colonoscopy asap   Wife transferred to endoscopy scheduling.

## 2022-03-04 NOTE — PROGRESS NOTES
This is a recent snapshot of the patient's [facility] medical record.  For current drug dose and complete information and questions, call [phone] or In Basket pool, fv home infusion ([id number])  CSN Number:  [id number]

## 2022-03-05 DIAGNOSIS — Z11.59 ENCOUNTER FOR SCREENING FOR OTHER VIRAL DISEASES: Primary | ICD-10-CM

## 2022-03-07 NOTE — PROGRESS NOTES
This is a recent snapshot of the patient's [facility] medical record.  For current drug dose and complete information and questions, call [phone] or In Basket pool, fv home infusion ([id number])  CSN Number:  [id number]

## 2022-03-11 ENCOUNTER — OFFICE VISIT (OUTPATIENT)
Dept: INTERNAL MEDICINE | Facility: CLINIC | Age: 52
End: 2022-03-11
Payer: COMMERCIAL

## 2022-03-11 VITALS
RESPIRATION RATE: 16 BRPM | OXYGEN SATURATION: 98 % | WEIGHT: 184 LBS | TEMPERATURE: 98.5 F | DIASTOLIC BLOOD PRESSURE: 76 MMHG | BODY MASS INDEX: 24.92 KG/M2 | HEIGHT: 72 IN | SYSTOLIC BLOOD PRESSURE: 126 MMHG | HEART RATE: 76 BPM

## 2022-03-11 DIAGNOSIS — M79.601 PAIN AND SWELLING OF RIGHT UPPER EXTREMITY: ICD-10-CM

## 2022-03-11 DIAGNOSIS — A49.1 STREPTOCOCCUS BOVIS INFECTION: ICD-10-CM

## 2022-03-11 DIAGNOSIS — Z98.84 S/P BARIATRIC SURGERY: ICD-10-CM

## 2022-03-11 DIAGNOSIS — T82.514D HICKMAN CATHETER DYSFUNCTION, SUBSEQUENT ENCOUNTER: ICD-10-CM

## 2022-03-11 DIAGNOSIS — I82.621 ACUTE DEEP VEIN THROMBOSIS (DVT) OF RIGHT UPPER EXTREMITY, UNSPECIFIED VEIN (H): Primary | ICD-10-CM

## 2022-03-11 DIAGNOSIS — M79.89 PAIN AND SWELLING OF RIGHT UPPER EXTREMITY: ICD-10-CM

## 2022-03-11 LAB
ALBUMIN SERPL-MCNC: 3.6 G/DL (ref 3.4–5)
ALP SERPL-CCNC: 57 U/L (ref 40–150)
ALT SERPL W P-5'-P-CCNC: 40 U/L (ref 0–70)
ANION GAP SERPL CALCULATED.3IONS-SCNC: 3 MMOL/L (ref 3–14)
AST SERPL W P-5'-P-CCNC: 17 U/L (ref 0–45)
BILIRUB SERPL-MCNC: 0.3 MG/DL (ref 0.2–1.3)
BUN SERPL-MCNC: 13 MG/DL (ref 7–30)
CALCIUM SERPL-MCNC: 8.6 MG/DL (ref 8.5–10.1)
CHLORIDE BLD-SCNC: 109 MMOL/L (ref 94–109)
CO2 SERPL-SCNC: 31 MMOL/L (ref 20–32)
CREAT SERPL-MCNC: 0.71 MG/DL (ref 0.66–1.25)
ERYTHROCYTE [DISTWIDTH] IN BLOOD BY AUTOMATED COUNT: 15.7 % (ref 10–15)
GFR SERPL CREATININE-BSD FRML MDRD: >90 ML/MIN/1.73M2
GLUCOSE BLD-MCNC: 97 MG/DL (ref 70–99)
HCT VFR BLD AUTO: 37.3 % (ref 40–53)
HGB BLD-MCNC: 11.7 G/DL (ref 13.3–17.7)
MCH RBC QN AUTO: 30.3 PG (ref 26.5–33)
MCHC RBC AUTO-ENTMCNC: 31.4 G/DL (ref 31.5–36.5)
MCV RBC AUTO: 97 FL (ref 78–100)
PLATELET # BLD AUTO: 174 10E3/UL (ref 150–450)
POTASSIUM BLD-SCNC: 3.9 MMOL/L (ref 3.4–5.3)
PROT SERPL-MCNC: 6.9 G/DL (ref 6.8–8.8)
RBC # BLD AUTO: 3.86 10E6/UL (ref 4.4–5.9)
SODIUM SERPL-SCNC: 143 MMOL/L (ref 133–144)
WBC # BLD AUTO: 4.6 10E3/UL (ref 4–11)

## 2022-03-11 PROCEDURE — 85027 COMPLETE CBC AUTOMATED: CPT | Performed by: INTERNAL MEDICINE

## 2022-03-11 PROCEDURE — 99495 TRANSJ CARE MGMT MOD F2F 14D: CPT | Performed by: INTERNAL MEDICINE

## 2022-03-11 PROCEDURE — 80053 COMPREHEN METABOLIC PANEL: CPT | Performed by: INTERNAL MEDICINE

## 2022-03-11 PROCEDURE — 36415 COLL VENOUS BLD VENIPUNCTURE: CPT | Performed by: INTERNAL MEDICINE

## 2022-03-11 NOTE — PROGRESS NOTES
Assessment & Plan   Problem List Items Addressed This Visit     S/P bariatric surgery    Relevant Orders    Comprehensive metabolic panel (BMP + Alb, Alk Phos, ALT, AST, Total. Bili, TP)      Other Visit Diagnoses     Acute deep vein thrombosis (DVT) of right upper extremity, unspecified vein (H)    -  Primary    Relevant Orders    CBC with platelets    Comprehensive metabolic panel (BMP + Alb, Alk Phos, ALT, AST, Total. Bili, TP)    Pain and swelling of right upper extremity        Relevant Orders    CBC with platelets    Comprehensive metabolic panel (BMP + Alb, Alk Phos, ALT, AST, Total. Bili, TP)    Streptococcus bovis infection        Relevant Orders    Comprehensive metabolic panel (BMP + Alb, Alk Phos, ALT, AST, Total. Bili, TP)    Cm catheter dysfunction, subsequent encounter             This is a patient who had a history of bariatric surgery, it was complicated by too much problems in his stomach and he cannot eat food anymore he is completely TPN dependent independent IV fluids.  Has had multiple vascular accesses but currently has a right-sided Cm catheter.  Unfortunately this catheter Deltec clot which was obstructing the right arm and right side of the head, when I saw him previously he was having swelling into the head.  Discussion with interventional radiology he was admitted to [facility] for this.  He was started on anticoagulation of Lovenox injections because the new novel agents would be difficult in order absorption in his GI tract.  Therefore he is on Lovenox twice a day for the next 3 months and may change to once a day.    He also has a history of a strep bovis infection and needs an evaluation of his colon he will undergo colonoscopy at the end of the month but he will not go off his Lovenox.  Therefore the only view of screening and visualization, if he needs a biopsy he will have to come back later when he can go off his anticoagulation.  It is too soon to his  clot to stop that.                   No follow-ups on file.    Chuy Isaac MD  [facility] MASHA Canales is a 51 year old who presents for the following health issues  accompanied by his spouse.    HPI     Post Discharge Outreach 3/1/2022   Admission Date 3/23/2022   Reason for Admission Right Upper Extremity DVT   Discharge Date 3/28/2022   Discharge Diagnosis Right Upper Extremity DVT   How are you doing now that you are home? Patient's wife reports they are doing well.  No quesions   How are your symptoms? (Red Flag symptoms escalate to triage hotline per guidelines) Improved   Do you feel your condition is stable enough to be safe at home until your provider visit? Yes   Does the patient have their discharge instructions?  Yes   Does the patient have questions regarding their discharge instructions?  No   Were you started on any new medications or were there changes to any of your previous medications?  Yes   Does the patient have all of their medications? Yes   Do you have questions regarding any of your medications?  No   Discharge follow-up appointment scheduled within 14 calendar days?  Yes   Discharge Follow Up Appointment Date 3/11/2022   Discharge Follow Up Appointment Scheduled with? Primary Care Provider     Hospital Follow-up Visit:    Hospital/Nursing Home/IP Rehab Facility: [facility]  Date of Admission: 02/24/2022  Date of Discharge: 02/28/2022  Reason(s) for Admission: Acute deep vein thrombosis      Was your hospitalization related to COVID-19? No   Problems taking medications regularly:  None  Medication changes since discharge: None  Problems adhering to non-medication therapy:  None    Summary of hospitalization:  [facility] discharge summary reviewed  Diagnostic Tests/Treatments reviewed.  Follow up needed: none  Other Healthcare Providers Involved in Patient s Care:         None  Update since  "discharge: improved. Post Discharge Medication Reconciliation: discharge medications reconciled and changed, per note/orders.  Plan of care communicated with patient and family          He had clots on his line and he is on lovenox and taking them twice a day for 3 months. Then maybe go to once a day.   Slight bloody nose, no other issues.      Other medications are stable after hospitalization.    Would like adderall to be filled on same day of the month.      Had an iron infusion in the hospital.      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     Complication of anesthesia      Difficulty swallowing      Gastric ulcer, unspecified as acute or chronic, without mention of hemorrhage, perforation, or obstruction      Gastro-oesophageal reflux disease      Head injury      Hiatal hernia      Other bladder disorder      Other chronic pain      PONV (postoperative nausea and vomiting)      Severe malnutrition (H)     TPN     Short gut syndrome      Tobacco abuse      Current Outpatient Medications   Medication     acetaminophen (TYLENOL) 32 mg/mL liquid     albuterol (PROAIR HFA/PROVENTIL HFA/VENTOLIN HFA) 108 (90 Base) MCG/ACT inhaler     amphetamine-dextroamphetamine (ADDERALL) 20 MG tablet     carvedilol (COREG) 6.25 MG tablet     cyanocobalamin (CYANOCOBALAMIN) 1000 MCG/ML injection     enoxaparin ANTICOAGULANT (LOVENOX) 80 MG/0.8ML syringe     [facility]ION MANAGED PATIENT     fentaNYL (DURAGESIC) 25 mcg/hr 72 hr patch     insulin syringe-needle U-100 (29G X 1/2\" 1 ML) 29G X 1/2\" 1 ML miscellaneous     lactated ringers infusion     lidocaine (LIDODERM) 5 % patch     LORazepam (ATIVAN) 1 MG tablet     nystatin (MYCOSTATIN) 772163 UNIT/GM " external cream     ondansetron (ZOFRAN-ODT) 8 MG ODT tab     oxyCODONE (ROXICODONE) 5 MG/5ML solution     pantoprazole (PROTONIX) 40 MG EC tablet     parenteral nutrition - PTA/DISCHARGE ORDER     polyethylene glycol (MIRALAX) 17 GM/Dose powder     sucralfate (CARAFATE) 1 GM/10ML suspension     VENTOLIN  (90 Base) MCG/ACT inhaler     vitamin D2 (ERGOCALCIFEROL) 20559 units (1250 mcg) capsule     diphenhydrAMINE (BENADRYL) 12.5 MG/5ML syrup     EPINEPHrine (ANY BX GENERIC EQUIV) 0.3 MG/0.3ML injection 2-pack     naloxone (NARCAN) 4 MG/0.1ML nasal spray     No current facility-administered medications for this visit.     Social History     Tobacco Use     Smoking status: Light Tobacco Smoker     Packs/day: 0.10     Years: 3.00     Pack years: 0.30     Types: Cigarettes     Smokeless tobacco: Never Used     Tobacco comment: 2/4/2021    smokes 3 cigarettes/day   Vaping Use     Vaping Use: Never used   Substance Use Topics     Alcohol use: No     Comment: quit 2002     Drug use: No           Review of Systems         Objective    /76 (BP Location: Left arm)   Pulse 76   Temp 98.5  F (36.9  C) (Temporal)   Resp 16   Ht 1.829 m (6')   Wt 83.5 kg (184 lb)   SpO2 98%   BMI 24.95 kg/m    There is no height or weight on file to calculate BMI.  Physical Exam   No acute distress right sided Cm is in place  Abdomen has some bruising from his Lovenox shots  Heart is regular  Lungs are clear  Right arm has no swelling  Neck is normal with elbow swelling seen the last time in clinic.

## 2022-03-11 NOTE — PROGRESS NOTES
This is a recent snapshot of the patient's [facility] medical record.  For current drug dose and complete information and questions, call [phone] or In Basket pool, fv home infusion ([id number])  CSN Number:  [id number]

## 2022-03-15 PROCEDURE — 82248 BILIRUBIN DIRECT: CPT | Performed by: SURGERY

## 2022-03-15 PROCEDURE — 80053 COMPREHEN METABOLIC PANEL: CPT | Performed by: SURGERY

## 2022-03-15 PROCEDURE — 83735 ASSAY OF MAGNESIUM: CPT | Performed by: SURGERY

## 2022-03-15 PROCEDURE — 82728 ASSAY OF FERRITIN: CPT | Performed by: SURGERY

## 2022-03-15 PROCEDURE — 84478 ASSAY OF TRIGLYCERIDES: CPT | Performed by: SURGERY

## 2022-03-15 PROCEDURE — 86140 C-REACTIVE PROTEIN: CPT | Performed by: SURGERY

## 2022-03-15 PROCEDURE — 84100 ASSAY OF PHOSPHORUS: CPT | Performed by: SURGERY

## 2022-03-15 PROCEDURE — 85025 COMPLETE CBC W/AUTO DIFF WBC: CPT | Performed by: SURGERY

## 2022-03-16 ENCOUNTER — HOME INFUSION (PRE-WILLOW HOME INFUSION) (OUTPATIENT)
Dept: PHARMACY | Facility: CLINIC | Age: 52
End: 2022-03-16
Payer: COMMERCIAL

## 2022-03-16 NOTE — PROGRESS NOTES
This is a recent snapshot of the patient's [facility] medical record.  For current drug dose and complete information and questions, call [phone] or In [facility] pool, fv home infusion ([id number])  CSN Number:  [id number]     [address] [place]

## 2022-03-16 NOTE — LETTER
[facility] CARE COORDINATION  [address] [facility] [address]   April 24, 2020        Parker Acevedo  [address]   [place] [address]          Dear Alina,     It was so nice to talk to you today!Attached is an updated Complex Care Plan for your continued enrollment in Care Coordination. Please let us know if you have additional questions, concerns, or goals that we can assist with.    Sincerely,    Melissa Behl BSN, RN, PHN, CCM  Primary Care Clinical RN Care Coordinator  [facility]   [phone]                                                [facility]  Complex Care Plan  About Me:    Patient Name:  Parker Acevedo    YOB: 1970  Age:         49 year old   [facility] MRN:    [id number] Telephone Information:  Home Phone [phone]   Mobile [phone]       Address:  [address]   [place] [address] Email address:  [email or web address]      Emergency Contact(s)    Name Relationship Lgl Grd Work Phone Home Phone Mobile Phone   1. BERTRAND RAMOS* Spouse No  [phone]   2. JEYSON CAIN * Relative No  [phone]   3. CHARLI ACEVEDO* Mother No  [phone]           Primary language:  English     needed? No   [facility] [employer]:  [phone] op. 1  Other communication barriers: Glasses, Physical impairment  Preferred Method of Communication:  Fritzt  Current living arrangement: I live in a private home with spouse  Mobility Status/ Medical Equipment: Independent    Health Maintenance  Health Maintenance Reviewed:   Health Maintenance Due   Topic Date Due     MEDICARE ANNUAL WELLNESS VISIT  06/21/2017     PHQ-2  01/01/2020        My Access Plan  Medical Emergency 911   Primary Clinic Line [facility] - [phone]   24 Hour Appointment Line [phone] or  [phone]) (toll-free)    24 Hour Nurse Line [phone] (toll-free)   Preferred Urgent Care Other   Preferred Hospital [facility] [place]  [phone]   Preferred Pharmacy [facility] [place] [address] [place] [address]     Behavioral Health Crisis Line The National Suicide Prevention Lifeline at 1-896.654.4933 or 911             My Care Team Members  Patient Care Team       Relationship Specialty Notifications Start End    Chuy Isaac MD PCP - General Internal Medicine  10/30/18     Phone: [phone] Fax: [phone]         [address] [place] [address]    Francis Vyas MD Referring Physician Surgery  4/9/15     GI     Phone: [phone] Fax: [phone]         [address] [facility] [address] [facility] [address]    Bill Brumfield MD MD Gastroenterology  6/2/15     Phone: [phone] Fax: [phone]         [address] [facility] [address] [facility] [address]    Patti Milligan MD MD Pain Clinic  6/2/15     Phone: [phone] Fax: [phone]         [address] [place] [address] [facility] [address]    Behl, Melissa K, RN Lead Care Coordinator Primary Care - CC Admissions 3/28/18     Phone: [phone] Fax: [phone]        Chuy Isaac MD Assigned PCP   11/11/18     Phone: [phone] Fax: [phone]         [address] [place] [address]    Marlyn Jenkins MD MD Ophthalmology  1/29/19     Phone: [phone] Fax: [phone]         [address] [facility] [address] [facility] [address]    Marlyn Jenkins MD MD Ophthalmology  2/11/19     Phone: [phone] Fax: [phone]         [address] [facility] [address] [facility] [address]            My Care Plans  Self Management and Treatment Plan  Goals and (Comments)  Goals        General    #1 Medical (pt-stated)     Notes - Note edited  4/24/2020  2:08 PM by Behl, Melissa K, RN    Goal Statement: I will see cardiology.  Measure of Success: Appointment will be scheduled and attended,  will check with cardiology regarding scheduling a virtual visit.  Supportive Steps to Achieve: Review of hospital discharge instructions.  Barriers: multiple specialists and appointments, covid-19 precautions  Strengths: care coordination, supportive spouse  Date to Achieve By: 6/31/20  Patient expressed understanding of goal: yes         #2 Medical (pt-stated)     Notes - Note edited  4/24/2020  1:53 PM by Behl, Melissa K, RN    Goal Statement: I will see bariatric surgery as instructed.  Measure of Success: Appointment will be scheduled and attended, will try to send virtual visit request and attach pictures of G-tube bile content  Supportive Steps to Achieve: Review of hospital discharge instructions.  Barriers: multiple specialists, covid-19 precautions  Strengths: care coordination, supportive spouse  Date to Achieve By: 6/24/20  Patient expressed understanding of goal: yes                Action Plans on File:                       Advance Care Plans/Directives Type:   Type Advanced Care Plans/Directives: Advanced Directive - On File    My Medical and Care Information  Problem List   Patient Active Problem List   Diagnosis     Peptic ulcer disease     Gastric bypass status for obesity     Chronic abdominal pain     Vomiting     Anemia     ADHD (attention deficit hyperactivity disorder), inattentive type     Vitamin B12 deficiency without anemia     Thiamine deficiency     Dehydration     Dysphagia     Weight loss, non-intentional     Malnutrition (H)     Chronic anxiety     Constipation     Bile reflux esophagitis     Former smoker     Vitamin D deficiency     Iron deficiency     Health Care Home     Chronic pain     Insomnia     Positive blood culture     Fungemia     Chronic nausea     Gastrostomy tube in place (H)     Cardiomyopathy in nutritional diseases (H)     Short gut syndrome     Anxiety     Status post cervical spinal arthrodesis     Port or reservoir infection, initial encounter     Abnormal  16-Mar-2022 09:59 echocardiogram     Low serum iron     Anemia, iron deficiency     Catheter-related bloodstream infection (CRBSI)     Generalized weakness     S/P bariatric surgery     Hyperlipidemia LDL goal <130     Bacteremia     Infection by Candida species     PICC line infiltration, sequela     Gram-positive bacteremia     On total parenteral nutrition      Current Medications and Allergies:  See printed Medication Report.    Care Coordination Start Date: 1/10/2019   Frequency of Care Coordination: monthly   Form Last Updated: 04/24/2020        14-Mar-2022 16:13

## 2022-03-17 NOTE — PROGRESS NOTES
This is a recent snapshot of the patient's [facility] medical record.  For current drug dose and complete information and questions, call [phone] or In Basket pool, fv home infusion ([id number])  CSN Number:  [id number]

## 2022-03-21 ENCOUNTER — MYC REFILL (OUTPATIENT)
Dept: INTERNAL MEDICINE | Facility: CLINIC | Age: 52
End: 2022-03-21
Payer: COMMERCIAL

## 2022-03-21 DIAGNOSIS — F90.0 ADHD (ATTENTION DEFICIT HYPERACTIVITY DISORDER), INATTENTIVE TYPE: ICD-10-CM

## 2022-03-22 ENCOUNTER — TELEPHONE (OUTPATIENT)
Dept: INTERNAL MEDICINE | Facility: CLINIC | Age: 52
End: 2022-03-22
Payer: COMMERCIAL

## 2022-03-22 ENCOUNTER — TELEPHONE (OUTPATIENT)
Dept: PALLIATIVE MEDICINE | Facility: CLINIC | Age: 52
End: 2022-03-22

## 2022-03-22 NOTE — TELEPHONE ENCOUNTER
Can transfer to PCP. Patient is refills only. He is very stable on his regimen. He manages his medications and refill requests appropriately. He does not partake in comprehensive pain management. He just needs monthly refills. Asking for provider review for transfer plan due to high doses. Will make appointment to discuss with PCP prior to next refill.     YADIRA LazarN, RN  Care Coordinator  [facility] [place]

## 2022-03-22 NOTE — TELEPHONE ENCOUNTER
Pt's wife called wondering if you are willing to take over pain management for pt or if he needs to continue to go to pain management clinic in [place]. Pt was alerted that provider at pain clinic Dr. Milligan passed away at the beginning of the month. The clinic did fill prescriptions for April but will start needing refills on may 1st.     Call wife [phone] mary jo.

## 2022-03-22 NOTE — TELEPHONE ENCOUNTER
Requested Prescriptions   Pending Prescriptions Disp Refills     LORazepam (ATIVAN) 1 MG tablet 30 tablet 0     Sig: TAKE 1 TABLET (1MG) BY MOUTH AS NEEDED FOR ANXIETY WITH TPN AND MEDICATIONS . JUST ONCE A DAY (30 TO LAST 30 DAYS)     Last Written Prescription Date:  03/03/2022  Last Fill Quantity: 30,   # refills: 0  Last Office Visit: 03/11/2022  Future Office visit:    Next 5 appointments (look out 90 days)    Apr 09, 2022 11:00 AM  Pre-procedure Covid with PH COVID LAB  [facility] ([facility] ) [address] [place] [address]  [phone]           Routing refill request to provider for review/approval because:  Drug not on the [facility], P or  Health refill protocol or controlled substance              amphetamine-dextroamphetamine (ADDERALL) 20 MG tablet 30 tablet 0     Sig: TAKE ONE TABLET BY MOUTH ONCE DAILY     Last Written Prescription Date:  03/03/2022  Last Fill Quantity: 30,   # refills: 0  Last Office Visit: 03/11/2022  Future Office visit:    Next 5 appointments (look out 90 days)    Apr 09, 2022 11:00 AM  Pre-procedure Covid with PH COVID LAB  [facility] ([facility] ) [address] [place] [address]  [phone]           Routing refill request to provider for review/approval because:  Drug not on the G, P or  Health refill protocol or controlled substance

## 2022-03-23 ENCOUNTER — HOME INFUSION (PRE-WILLOW HOME INFUSION) (OUTPATIENT)
Dept: PHARMACY | Facility: CLINIC | Age: 52
End: 2022-03-23
Payer: COMMERCIAL

## 2022-03-23 ENCOUNTER — MEDICAL CORRESPONDENCE (OUTPATIENT)
Dept: HEALTH INFORMATION MANAGEMENT | Facility: CLINIC | Age: 52
End: 2022-03-23
Payer: COMMERCIAL

## 2022-03-23 NOTE — TELEPHONE ENCOUNTER
Called and talked to wife. She stated she thought pts pain has been increasing. They will go back to pain management and get set up with another provider.

## 2022-03-23 NOTE — TELEPHONE ENCOUNTER
Rose calling on behalf of patient. Would like a call back at [phone].    Estelle RAMOS    [facility]

## 2022-03-24 NOTE — TELEPHONE ENCOUNTER
Spoke to Rose and she reports Troys primary care provider would like him to stay in pain management due to the complexity of his pain. He will schedule a transfer appointment in [place].     YADIRA LazarN, RN  Care Coordinator  [facility] [place]

## 2022-03-29 ENCOUNTER — LAB REQUISITION (OUTPATIENT)
Dept: LAB | Facility: CLINIC | Age: 52
End: 2022-03-29
Payer: COMMERCIAL

## 2022-03-29 DIAGNOSIS — T80.211D BLOODSTREAM INFECTION DUE TO CENTRAL VENOUS CATHETER, SUBSEQUENT ENCOUNTER: ICD-10-CM

## 2022-03-29 LAB
ALBUMIN SERPL-MCNC: 3.5 G/DL (ref 3.4–5)
ALP SERPL-CCNC: 48 U/L (ref 40–150)
ALT SERPL W P-5'-P-CCNC: 24 U/L (ref 0–70)
ANION GAP SERPL CALCULATED.3IONS-SCNC: 3 MMOL/L (ref 3–14)
AST SERPL W P-5'-P-CCNC: 16 U/L (ref 0–45)
BASOPHILS # BLD AUTO: 0 10E3/UL (ref 0–0.2)
BASOPHILS NFR BLD AUTO: 1 %
BILIRUB DIRECT SERPL-MCNC: 0.2 MG/DL (ref 0–0.2)
BILIRUB SERPL-MCNC: 0.7 MG/DL (ref 0.2–1.3)
BILIRUB SERPL-MCNC: 0.7 MG/DL (ref 0.2–1.3)
BUN SERPL-MCNC: 12 MG/DL (ref 7–30)
CALCIUM SERPL-MCNC: 8.3 MG/DL (ref 8.5–10.1)
CHLORIDE BLD-SCNC: 110 MMOL/L (ref 94–109)
CO2 SERPL-SCNC: 29 MMOL/L (ref 20–32)
CREAT SERPL-MCNC: 0.68 MG/DL (ref 0.66–1.25)
EOSINOPHIL # BLD AUTO: 0.1 10E3/UL (ref 0–0.7)
EOSINOPHIL NFR BLD AUTO: 2 %
ERYTHROCYTE [DISTWIDTH] IN BLOOD BY AUTOMATED COUNT: 15.8 % (ref 10–15)
FASTING STATUS PATIENT QL REPORTED: NORMAL
GFR SERPL CREATININE-BSD FRML MDRD: >90 ML/MIN/1.73M2
GLUCOSE BLD-MCNC: 84 MG/DL (ref 70–99)
HCT VFR BLD AUTO: 40.9 % (ref 40–53)
HGB BLD-MCNC: 13.1 G/DL (ref 13.3–17.7)
IMM GRANULOCYTES # BLD: 0 10E3/UL
IMM GRANULOCYTES NFR BLD: 0 %
LYMPHOCYTES # BLD AUTO: 1.4 10E3/UL (ref 0.8–5.3)
LYMPHOCYTES NFR BLD AUTO: 23 %
MCH RBC QN AUTO: 30.4 PG (ref 26.5–33)
MCHC RBC AUTO-ENTMCNC: 32 G/DL (ref 31.5–36.5)
MCV RBC AUTO: 95 FL (ref 78–100)
MONOCYTES # BLD AUTO: 0.5 10E3/UL (ref 0–1.3)
MONOCYTES NFR BLD AUTO: 9 %
NEUTROPHILS # BLD AUTO: 4.1 10E3/UL (ref 1.6–8.3)
NEUTROPHILS NFR BLD AUTO: 65 %
NRBC # BLD AUTO: 0 10E3/UL
NRBC BLD AUTO-RTO: 0 /100
PLATELET # BLD AUTO: 200 10E3/UL (ref 150–450)
POTASSIUM BLD-SCNC: 3.9 MMOL/L (ref 3.4–5.3)
PROT SERPL-MCNC: 6.4 G/DL (ref 6.8–8.8)
RBC # BLD AUTO: 4.31 10E6/UL (ref 4.4–5.9)
SODIUM SERPL-SCNC: 142 MMOL/L (ref 133–144)
TRIGL SERPL-MCNC: 93 MG/DL
WBC # BLD AUTO: 6.2 10E3/UL (ref 4–11)

## 2022-03-29 PROCEDURE — 82248 BILIRUBIN DIRECT: CPT | Performed by: SURGERY

## 2022-03-29 PROCEDURE — 85025 COMPLETE CBC W/AUTO DIFF WBC: CPT | Performed by: SURGERY

## 2022-03-29 PROCEDURE — 80053 COMPREHEN METABOLIC PANEL: CPT | Performed by: SURGERY

## 2022-03-29 PROCEDURE — 84478 ASSAY OF TRIGLYCERIDES: CPT | Performed by: SURGERY

## 2022-03-30 ENCOUNTER — HOME INFUSION (PRE-WILLOW HOME INFUSION) (OUTPATIENT)
Dept: PHARMACY | Facility: CLINIC | Age: 52
End: 2022-03-30
Payer: COMMERCIAL

## 2022-04-01 RX ORDER — LORAZEPAM 1 MG/1
TABLET ORAL
Qty: 30 TABLET | Refills: 0 | Status: SHIPPED | OUTPATIENT
Start: 2022-04-01 | End: 2022-05-02

## 2022-04-01 RX ORDER — DEXTROAMPHETAMINE SACCHARATE, AMPHETAMINE ASPARTATE, DEXTROAMPHETAMINE SULFATE AND AMPHETAMINE SULFATE 5; 5; 5; 5 MG/1; MG/1; MG/1; MG/1
TABLET ORAL
Qty: 30 TABLET | Refills: 0 | Status: SHIPPED | OUTPATIENT
Start: 2022-04-01 | End: 2022-05-06

## 2022-04-04 ENCOUNTER — TELEPHONE (OUTPATIENT)
Dept: INTERNAL MEDICINE | Facility: CLINIC | Age: 52
End: 2022-04-04
Payer: COMMERCIAL

## 2022-04-04 DIAGNOSIS — R05.8 PRODUCTIVE COUGH: ICD-10-CM

## 2022-04-04 NOTE — TELEPHONE ENCOUNTER
Reason for Call:  Form, our goal is to have forms completed with 72 hours, however, some forms may require a visit or additional information.    Type of letter, form or note:  Home Health Certification    Who is the form from?: Home care    Where did the form come from: form was faxed in    What clinic location was the form placed at?: [facility]     Where the form was placed: Given to [facility] [place]    What number is listed as a contact on the form?: [phone]       Additional comments:     Call taken on 4/4/2022 at 9:27 AM by Nafisa Chen

## 2022-04-05 ENCOUNTER — HOME INFUSION (PRE-WILLOW HOME INFUSION) (OUTPATIENT)
Dept: PHARMACY | Facility: CLINIC | Age: 52
End: 2022-04-05

## 2022-04-05 ENCOUNTER — MYC MEDICAL ADVICE (OUTPATIENT)
Dept: INTERNAL MEDICINE | Facility: CLINIC | Age: 52
End: 2022-04-05
Payer: COMMERCIAL

## 2022-04-05 ENCOUNTER — DOCUMENTATION ONLY (OUTPATIENT)
Dept: INFECTIOUS DISEASES | Facility: CLINIC | Age: 52
End: 2022-04-05
Payer: COMMERCIAL

## 2022-04-05 DIAGNOSIS — I82.621 ACUTE DEEP VEIN THROMBOSIS (DVT) OF RIGHT UPPER EXTREMITY, UNSPECIFIED VEIN (H): Primary | ICD-10-CM

## 2022-04-06 ENCOUNTER — LAB REQUISITION (OUTPATIENT)
Dept: LAB | Facility: CLINIC | Age: 52
End: 2022-04-06
Payer: COMMERCIAL

## 2022-04-06 ENCOUNTER — TELEPHONE (OUTPATIENT)
Dept: PALLIATIVE MEDICINE | Facility: CLINIC | Age: 52
End: 2022-04-06

## 2022-04-06 DIAGNOSIS — T80.211D BLOODSTREAM INFECTION DUE TO CENTRAL VENOUS CATHETER, SUBSEQUENT ENCOUNTER: ICD-10-CM

## 2022-04-06 LAB
ALBUMIN SERPL-MCNC: 3.2 G/DL (ref 3.4–5)
ALP SERPL-CCNC: 48 U/L (ref 40–150)
ALT SERPL W P-5'-P-CCNC: 36 U/L (ref 0–70)
ANION GAP SERPL CALCULATED.3IONS-SCNC: 5 MMOL/L (ref 3–14)
AST SERPL W P-5'-P-CCNC: 14 U/L (ref 0–45)
BASOPHILS # BLD AUTO: 0 10E3/UL (ref 0–0.2)
BASOPHILS NFR BLD AUTO: 0 %
BILIRUB DIRECT SERPL-MCNC: 0.1 MG/DL (ref 0–0.2)
BILIRUB SERPL-MCNC: 0.5 MG/DL (ref 0.2–1.3)
BILIRUB SERPL-MCNC: 0.5 MG/DL (ref 0.2–1.3)
BUN SERPL-MCNC: 14 MG/DL (ref 7–30)
CALCIUM SERPL-MCNC: 8.4 MG/DL (ref 8.5–10.1)
CHLORIDE BLD-SCNC: 108 MMOL/L (ref 94–109)
CO2 SERPL-SCNC: 28 MMOL/L (ref 20–32)
CREAT SERPL-MCNC: 0.72 MG/DL (ref 0.66–1.25)
EOSINOPHIL # BLD AUTO: 0.1 10E3/UL (ref 0–0.7)
EOSINOPHIL NFR BLD AUTO: 2 %
ERYTHROCYTE [DISTWIDTH] IN BLOOD BY AUTOMATED COUNT: 15.6 % (ref 10–15)
FASTING STATUS PATIENT QL REPORTED: NORMAL
GFR SERPL CREATININE-BSD FRML MDRD: >90 ML/MIN/1.73M2
GLUCOSE BLD-MCNC: 82 MG/DL (ref 70–99)
HCT VFR BLD AUTO: 31.5 % (ref 40–53)
HGB BLD-MCNC: 10 G/DL (ref 13.3–17.7)
HOLD SPECIMEN: NORMAL
HOLD SPECIMEN: NORMAL
IMM GRANULOCYTES # BLD: 0 10E3/UL
IMM GRANULOCYTES NFR BLD: 0 %
LYMPHOCYTES # BLD AUTO: 1.5 10E3/UL (ref 0.8–5.3)
LYMPHOCYTES NFR BLD AUTO: 18 %
MAGNESIUM SERPL-MCNC: 1.9 MG/DL (ref 1.6–2.3)
MCH RBC QN AUTO: 30.8 PG (ref 26.5–33)
MCHC RBC AUTO-ENTMCNC: 31.7 G/DL (ref 31.5–36.5)
MCV RBC AUTO: 97 FL (ref 78–100)
MONOCYTES # BLD AUTO: 1.2 10E3/UL (ref 0–1.3)
MONOCYTES NFR BLD AUTO: 15 %
NEUTROPHILS # BLD AUTO: 5.3 10E3/UL (ref 1.6–8.3)
NEUTROPHILS NFR BLD AUTO: 65 %
NRBC # BLD AUTO: 0 10E3/UL
NRBC BLD AUTO-RTO: 0 /100
PHOSPHATE SERPL-MCNC: 3.6 MG/DL (ref 2.5–4.5)
PLATELET # BLD AUTO: 139 10E3/UL (ref 150–450)
POTASSIUM BLD-SCNC: 3.5 MMOL/L (ref 3.4–5.3)
PROT SERPL-MCNC: 6 G/DL (ref 6.8–8.8)
RBC # BLD AUTO: 3.25 10E6/UL (ref 4.4–5.9)
SODIUM SERPL-SCNC: 141 MMOL/L (ref 133–144)
TRIGL SERPL-MCNC: 54 MG/DL
WBC # BLD AUTO: 8.2 10E3/UL (ref 4–11)

## 2022-04-06 PROCEDURE — 87040 BLOOD CULTURE FOR BACTERIA: CPT | Performed by: INTERNAL MEDICINE

## 2022-04-06 PROCEDURE — 85025 COMPLETE CBC W/AUTO DIFF WBC: CPT | Performed by: INTERNAL MEDICINE

## 2022-04-06 PROCEDURE — 84478 ASSAY OF TRIGLYCERIDES: CPT | Performed by: INTERNAL MEDICINE

## 2022-04-06 PROCEDURE — 82248 BILIRUBIN DIRECT: CPT | Performed by: INTERNAL MEDICINE

## 2022-04-06 PROCEDURE — 84100 ASSAY OF PHOSPHORUS: CPT | Performed by: INTERNAL MEDICINE

## 2022-04-06 PROCEDURE — 83735 ASSAY OF MAGNESIUM: CPT | Performed by: INTERNAL MEDICINE

## 2022-04-06 PROCEDURE — 80053 COMPREHEN METABOLIC PANEL: CPT | Performed by: INTERNAL MEDICINE

## 2022-04-06 RX ORDER — ALBUTEROL SULFATE 90 UG/1
AEROSOL, METERED RESPIRATORY (INHALATION)
Qty: 18 G | Refills: 0 | Status: ON HOLD | OUTPATIENT
Start: 2022-04-06 | End: 2022-05-12

## 2022-04-06 NOTE — TELEPHONE ENCOUNTER
Pending Prescriptions:                       Disp   Refills    VENTOLIN  (90 Base) MCG/ACT inhaler*18 g   0        Sig: INHALE TWO PUFFS BY MOUTH EVERY 4 HOURS AS NEEDED FOR           SHORTNESS OF BREATH /DYSPNEA OR WHEEZING        Routing refill request to provider for review/approval because:  Medication is reported/historical  Aravind Grimm, YADIRAN, RN, PHN  Casual Clinic RN for [place]  April 6, 2022

## 2022-04-06 NOTE — TELEPHONE ENCOUNTER
Prior Authorization Specialty Medication Request    Medication/Dose: oxyCODONE (ROXICODONE) 5 MG/5ML solution  ICD code (if different than what is on RX):  Chronic abdominal pain [R10.9, G89.29]   Previously Tried and Failed:  ...    Important Lab Values: ...  Rationale: ...    Insurance Name:   Coverage information:     Subscriber: [id number] SARA HASSAN     Rel to sub: 01 - Self     Member ID: [id number]     Payor: 3-BCBS Ph: [phone]     Benefit plan: [id number] MEDICARE ADVANTAGE Ph: [phone]     Group number: [id number]     Member effective dates: from 01/01/19          Pharmacy Information (if different than what is on RX)  Name:  ...  Phone:  ...

## 2022-04-06 NOTE — TELEPHONE ENCOUNTER
Rescheduled procedure.     Pre assessment questions completed for upcoming colonoscopy procedure scheduled on 4/13/22 with patient's wife Rose.     COVID test scheduled 4/9/22    Reviewed procedural arrival time 0700 and facility location [facility].    Designated  policy reviewed. Instructed to have someone stay 24 hours post procedure.     Procedure indication: +strep bovus bacteremia    Bowel prep recommendation: Extended prep d/t inadequate prep.     Per wife patient has bowel prep and instructions.     Reviewed Extended prep instructions with patient's wife. No fiber/iron supplements or foods that contain nuts/seeds    Anticoagulation/blood thinners? Patient is not stopping Enoxaparin (Lovenox). Will still consult with provider to get plan for day of procedure. Will send FYI to provider.     Patient's wife verbalized understanding and had no questions or concerns at this time.    Jyothi Holloway RN

## 2022-04-06 NOTE — PROGRESS NOTES
Received page from patient's wife while on call for Gen ID. Pt spiked a fever to 101, spouse calling to request blood culture orders.    Reports primary ID doc Dr Buckner has provided instructions to get blood cultures if fevers >100.4. All evening pts temp has been going up starting at 99s. Parker also with some chills and sweats. No other symptoms reported at this time.    On review, looks like pt on TPN, several line associated polymicrobial bacteremias in the past, most recently 1/2022 with Petra epi, Staph hominis.    They usually have a home nurse with [facility] come out to collect labs.    Blood cultures if I order them tonight will likely not happen until tomorrow. They have a BP machine at home. Advised to bring Parker in to the ER if persistent fevers, SBP<100, HR >100, or if he gets any sicker. Wife understands and is constantly monitoring him, advised a low threshold to come in so he could get an evaluation and fluids etc as needed, she expressed understanding.     Paged [facility] and have ordered 2 sets of blood cultures.    Will let Dr Buckner know in am.    Sri Diez   ID Staff

## 2022-04-07 ENCOUNTER — HOME INFUSION (PRE-WILLOW HOME INFUSION) (OUTPATIENT)
Dept: PHARMACY | Facility: CLINIC | Age: 52
End: 2022-04-07

## 2022-04-07 NOTE — TELEPHONE ENCOUNTER
Central Prior Authorization Team   Phone: [phone]    PA Initiation    Medication: oxyCODONE (ROXICODONE) 5 MG/5ML solution  Insurance Company: [facility] - Phone [phone] Fax [phone]  Pharmacy [place] the [place] [facility] [place] [address] [place] [address]  Filling Pharmacy Phone: [phone]  Filling Pharmacy Fax:    Start Date: 4/7/2022

## 2022-04-08 RX ORDER — ALBUTEROL SULFATE 90 UG/1
2 AEROSOL, METERED RESPIRATORY (INHALATION) EVERY 6 HOURS
COMMUNITY
End: 2022-05-07

## 2022-04-09 ENCOUNTER — LAB (OUTPATIENT)
Dept: LAB | Facility: CLINIC | Age: 52
End: 2022-04-09
Payer: COMMERCIAL

## 2022-04-09 DIAGNOSIS — Z11.59 ENCOUNTER FOR SCREENING FOR OTHER VIRAL DISEASES: ICD-10-CM

## 2022-04-09 PROCEDURE — U0005 INFEC AGEN DETEC AMPLI PROBE: HCPCS

## 2022-04-09 PROCEDURE — U0003 INFECTIOUS AGENT DETECTION BY NUCLEIC ACID (DNA OR RNA); SEVERE ACUTE RESPIRATORY SYNDROME CORONAVIRUS 2 (SARS-COV-2) (CORONAVIRUS DISEASE [COVID-19]), AMPLIFIED PROBE TECHNIQUE, MAKING USE OF HIGH THROUGHPUT TECHNOLOGIES AS DESCRIBED BY CMS-2020-01-R: HCPCS

## 2022-04-10 LAB — SARS-COV-2 RNA RESP QL NAA+PROBE: NEGATIVE

## 2022-04-11 DIAGNOSIS — Z53.9 DIAGNOSIS NOT YET DEFINED: Primary | ICD-10-CM

## 2022-04-11 LAB
BACTERIA BLD CULT: NO GROWTH

## 2022-04-11 PROCEDURE — G0179 MD RECERTIFICATION HHA PT: HCPCS | Performed by: INTERNAL MEDICINE

## 2022-04-11 NOTE — TELEPHONE ENCOUNTER
Prior Authorization Approval    Authorization Effective Date: 1/8/2022  Authorization Expiration Date: 4/8/2023  Medication: oxyCODONE (ROXICODONE) 5 MG/5ML solution  Approved Dose/Quantity:    Reference #:     Insurance Company: [employer] [place] - Phone [phone] Fax [phone]  Expected CoPay:       CoPay Card Available:      Foundation Assistance Needed:    Which Pharmacy is filling the prescription (Not needed for infusion/clinic administered): [facility] [place] [address] [place] [address]  Pharmacy Notified: Yes  Patient Notified: Yes  **Instructed pharmacy to notify patient when script is ready to /ship.**

## 2022-04-12 ENCOUNTER — LAB REQUISITION (OUTPATIENT)
Dept: LAB | Facility: CLINIC | Age: 52
End: 2022-04-12
Payer: COMMERCIAL

## 2022-04-12 ENCOUNTER — HOME INFUSION (PRE-WILLOW HOME INFUSION) (OUTPATIENT)
Dept: PHARMACY | Facility: CLINIC | Age: 52
End: 2022-04-12

## 2022-04-12 ENCOUNTER — MYC MEDICAL ADVICE (OUTPATIENT)
Dept: INTERNAL MEDICINE | Facility: CLINIC | Age: 52
End: 2022-04-12
Payer: COMMERCIAL

## 2022-04-12 DIAGNOSIS — R13.10 DYSPHAGIA, UNSPECIFIED: ICD-10-CM

## 2022-04-12 LAB
ALBUMIN SERPL-MCNC: 3.1 G/DL (ref 3.4–5)
ALP SERPL-CCNC: 47 U/L (ref 40–150)
ALT SERPL W P-5'-P-CCNC: 23 U/L (ref 0–70)
ANION GAP SERPL CALCULATED.3IONS-SCNC: 3 MMOL/L (ref 3–14)
AST SERPL W P-5'-P-CCNC: 14 U/L (ref 0–45)
BASOPHILS # BLD AUTO: 0 10E3/UL (ref 0–0.2)
BASOPHILS NFR BLD AUTO: 1 %
BILIRUB DIRECT SERPL-MCNC: <0.1 MG/DL (ref 0–0.2)
BILIRUB SERPL-MCNC: 0.3 MG/DL (ref 0.2–1.3)
BILIRUB SERPL-MCNC: 0.3 MG/DL (ref 0.2–1.3)
BUN SERPL-MCNC: 19 MG/DL (ref 7–30)
CALCIUM SERPL-MCNC: 7.9 MG/DL (ref 8.5–10.1)
CHLORIDE BLD-SCNC: 113 MMOL/L (ref 94–109)
CO2 SERPL-SCNC: 28 MMOL/L (ref 20–32)
CREAT SERPL-MCNC: 0.64 MG/DL (ref 0.66–1.25)
EOSINOPHIL # BLD AUTO: 0.1 10E3/UL (ref 0–0.7)
EOSINOPHIL NFR BLD AUTO: 2 %
ERYTHROCYTE [DISTWIDTH] IN BLOOD BY AUTOMATED COUNT: 14.7 % (ref 10–15)
FASTING STATUS PATIENT QL REPORTED: NO
GFR SERPL CREATININE-BSD FRML MDRD: >90 ML/MIN/1.73M2
GLUCOSE BLD-MCNC: 82 MG/DL (ref 70–99)
HCT VFR BLD AUTO: 32.3 % (ref 40–53)
HGB BLD-MCNC: 10.3 G/DL (ref 13.3–17.7)
IMM GRANULOCYTES # BLD: 0 10E3/UL
IMM GRANULOCYTES NFR BLD: 0 %
LYMPHOCYTES # BLD AUTO: 1.2 10E3/UL (ref 0.8–5.3)
LYMPHOCYTES NFR BLD AUTO: 22 %
MAGNESIUM SERPL-MCNC: 2.1 MG/DL (ref 1.6–2.3)
MCH RBC QN AUTO: 30.7 PG (ref 26.5–33)
MCHC RBC AUTO-ENTMCNC: 31.9 G/DL (ref 31.5–36.5)
MCV RBC AUTO: 96 FL (ref 78–100)
MONOCYTES # BLD AUTO: 0.6 10E3/UL (ref 0–1.3)
MONOCYTES NFR BLD AUTO: 12 %
NEUTROPHILS # BLD AUTO: 3.3 10E3/UL (ref 1.6–8.3)
NEUTROPHILS NFR BLD AUTO: 63 %
NRBC # BLD AUTO: 0 10E3/UL
NRBC BLD AUTO-RTO: 0 /100
PHOSPHATE SERPL-MCNC: 2.6 MG/DL (ref 2.5–4.5)
PLATELET # BLD AUTO: 160 10E3/UL (ref 150–450)
POTASSIUM BLD-SCNC: 3.7 MMOL/L (ref 3.4–5.3)
PROT SERPL-MCNC: 6 G/DL (ref 6.8–8.8)
RBC # BLD AUTO: 3.35 10E6/UL (ref 4.4–5.9)
SODIUM SERPL-SCNC: 144 MMOL/L (ref 133–144)
TRIGL SERPL-MCNC: 77 MG/DL
WBC # BLD AUTO: 5.2 10E3/UL (ref 4–11)

## 2022-04-12 PROCEDURE — 36591 DRAW BLOOD OFF VENOUS DEVICE: CPT | Performed by: SURGERY

## 2022-04-12 PROCEDURE — 83735 ASSAY OF MAGNESIUM: CPT | Performed by: SURGERY

## 2022-04-12 PROCEDURE — 82248 BILIRUBIN DIRECT: CPT | Performed by: SURGERY

## 2022-04-12 PROCEDURE — 84478 ASSAY OF TRIGLYCERIDES: CPT | Performed by: SURGERY

## 2022-04-12 PROCEDURE — 84100 ASSAY OF PHOSPHORUS: CPT | Performed by: SURGERY

## 2022-04-12 PROCEDURE — 85025 COMPLETE CBC W/AUTO DIFF WBC: CPT | Performed by: SURGERY

## 2022-04-12 PROCEDURE — 80053 COMPREHEN METABOLIC PANEL: CPT | Performed by: SURGERY

## 2022-04-13 ENCOUNTER — ANESTHESIA EVENT (OUTPATIENT)
Dept: SURGERY | Facility: AMBULATORY SURGERY CENTER | Age: 52
End: 2022-04-13
Payer: COMMERCIAL

## 2022-04-13 ENCOUNTER — HOSPITAL ENCOUNTER (OUTPATIENT)
Facility: AMBULATORY SURGERY CENTER | Age: 52
Discharge: HOME | End: 2022-04-13
Attending: INTERNAL MEDICINE
Payer: COMMERCIAL

## 2022-04-13 ENCOUNTER — HOSPITAL ENCOUNTER (OUTPATIENT)
Facility: CLINIC | Age: 52
End: 2022-04-13
Attending: INTERNAL MEDICINE | Admitting: INTERNAL MEDICINE
Payer: COMMERCIAL

## 2022-04-13 ENCOUNTER — ANESTHESIA (OUTPATIENT)
Dept: SURGERY | Facility: AMBULATORY SURGERY CENTER | Age: 52
End: 2022-04-13
Payer: COMMERCIAL

## 2022-04-13 ENCOUNTER — PREP FOR PROCEDURE (OUTPATIENT)
Dept: SURGERY | Facility: AMBULATORY SURGERY CENTER | Age: 52
End: 2022-04-13
Payer: COMMERCIAL

## 2022-04-13 VITALS
OXYGEN SATURATION: 100 % | DIASTOLIC BLOOD PRESSURE: 66 MMHG | SYSTOLIC BLOOD PRESSURE: 120 MMHG | WEIGHT: 190 LBS | HEIGHT: 71 IN | RESPIRATION RATE: 14 BRPM | HEART RATE: 71 BPM | TEMPERATURE: 97 F | BODY MASS INDEX: 26.6 KG/M2

## 2022-04-13 VITALS — HEART RATE: 56 BPM

## 2022-04-13 DIAGNOSIS — R78.81 GRAM-POSITIVE BACTEREMIA: Primary | ICD-10-CM

## 2022-04-13 LAB — COLONOSCOPY: NORMAL

## 2022-04-13 PROCEDURE — 45378 DIAGNOSTIC COLONOSCOPY: CPT | Mod: 74

## 2022-04-13 RX ORDER — PROPOFOL 10 MG/ML
INJECTION, EMULSION INTRAVENOUS CONTINUOUS PRN
Status: DISCONTINUED | OUTPATIENT
Start: 2022-04-13 | End: 2022-04-13

## 2022-04-13 RX ORDER — SIMETHICONE
LIQUID (ML) MISCELLANEOUS PRN
Status: DISCONTINUED | OUTPATIENT
Start: 2022-04-13 | End: 2022-04-13 | Stop reason: HOSPADM

## 2022-04-13 RX ORDER — NALOXONE HYDROCHLORIDE 0.4 MG/ML
0.2 INJECTION, SOLUTION INTRAMUSCULAR; INTRAVENOUS; SUBCUTANEOUS
Status: DISCONTINUED | OUTPATIENT
Start: 2022-04-13 | End: 2022-04-14 | Stop reason: HOSPADM

## 2022-04-13 RX ORDER — HEPARIN SODIUM,PORCINE 10 UNIT/ML
5-20 VIAL (ML) INTRAVENOUS EVERY 24 HOURS
Status: DISCONTINUED | OUTPATIENT
Start: 2022-04-13 | End: 2022-04-14 | Stop reason: HOSPADM

## 2022-04-13 RX ORDER — FLUMAZENIL 0.1 MG/ML
0.2 INJECTION, SOLUTION INTRAVENOUS
Status: ACTIVE | OUTPATIENT
Start: 2022-04-13 | End: 2022-04-13

## 2022-04-13 RX ORDER — LIDOCAINE HYDROCHLORIDE 20 MG/ML
INJECTION, SOLUTION INFILTRATION; PERINEURAL PRN
Status: DISCONTINUED | OUTPATIENT
Start: 2022-04-13 | End: 2022-04-13

## 2022-04-13 RX ORDER — ONDANSETRON 2 MG/ML
4 INJECTION INTRAMUSCULAR; INTRAVENOUS EVERY 6 HOURS PRN
Status: DISCONTINUED | OUTPATIENT
Start: 2022-04-13 | End: 2022-04-14 | Stop reason: HOSPADM

## 2022-04-13 RX ORDER — ONDANSETRON 4 MG/1
4 TABLET, ORALLY DISINTEGRATING ORAL EVERY 6 HOURS PRN
Status: DISCONTINUED | OUTPATIENT
Start: 2022-04-13 | End: 2022-04-14 | Stop reason: HOSPADM

## 2022-04-13 RX ORDER — SODIUM CHLORIDE, SODIUM LACTATE, POTASSIUM CHLORIDE, CALCIUM CHLORIDE 600; 310; 30; 20 MG/100ML; MG/100ML; MG/100ML; MG/100ML
INJECTION, SOLUTION INTRAVENOUS CONTINUOUS
Status: DISCONTINUED | OUTPATIENT
Start: 2022-04-13 | End: 2022-04-13 | Stop reason: HOSPADM

## 2022-04-13 RX ORDER — ONDANSETRON 2 MG/ML
4 INJECTION INTRAMUSCULAR; INTRAVENOUS
Status: DISCONTINUED | OUTPATIENT
Start: 2022-04-13 | End: 2022-04-13 | Stop reason: HOSPADM

## 2022-04-13 RX ORDER — HEPARIN SODIUM,PORCINE 10 UNIT/ML
5-20 VIAL (ML) INTRAVENOUS
Status: DISCONTINUED | OUTPATIENT
Start: 2022-04-13 | End: 2022-04-14 | Stop reason: HOSPADM

## 2022-04-13 RX ORDER — NALOXONE HYDROCHLORIDE 0.4 MG/ML
0.4 INJECTION, SOLUTION INTRAMUSCULAR; INTRAVENOUS; SUBCUTANEOUS
Status: DISCONTINUED | OUTPATIENT
Start: 2022-04-13 | End: 2022-04-14 | Stop reason: HOSPADM

## 2022-04-13 RX ORDER — PROPOFOL 10 MG/ML
INJECTION, EMULSION INTRAVENOUS PRN
Status: DISCONTINUED | OUTPATIENT
Start: 2022-04-13 | End: 2022-04-13

## 2022-04-13 RX ORDER — LIDOCAINE 40 MG/G
CREAM TOPICAL
Status: DISCONTINUED | OUTPATIENT
Start: 2022-04-13 | End: 2022-04-13 | Stop reason: HOSPADM

## 2022-04-13 RX ORDER — PROCHLORPERAZINE MALEATE 10 MG
10 TABLET ORAL EVERY 6 HOURS PRN
Status: DISCONTINUED | OUTPATIENT
Start: 2022-04-13 | End: 2022-04-14 | Stop reason: HOSPADM

## 2022-04-13 RX ADMIN — PROPOFOL 50 MG: 10 INJECTION, EMULSION INTRAVENOUS at 08:35

## 2022-04-13 RX ADMIN — Medication 5 ML: at 09:14

## 2022-04-13 RX ADMIN — SODIUM CHLORIDE, SODIUM LACTATE, POTASSIUM CHLORIDE, CALCIUM CHLORIDE: 600; 310; 30; 20 INJECTION, SOLUTION INTRAVENOUS at 08:15

## 2022-04-13 RX ADMIN — PROPOFOL 200 MCG/KG/MIN: 10 INJECTION, EMULSION INTRAVENOUS at 08:35

## 2022-04-13 RX ADMIN — LIDOCAINE HYDROCHLORIDE 60 MG: 20 INJECTION, SOLUTION INFILTRATION; PERINEURAL at 08:33

## 2022-04-13 NOTE — OR NURSING
Pt pulled off monitors and got dressed. Pt agitated and wants to go home. Informed pt that doctor wants to speak to him before leaving. Pt's wife trying to calm pt and have him wait for doctor. Pt in room with door closed, waiting for doctor at this point.

## 2022-04-13 NOTE — ANESTHESIA CARE TRANSFER NOTE
Patient: Parker Acevedo    Procedure: Procedure(s):  COLONOSCOPY       Diagnosis: Bacteremia [R78.81]  Diagnosis Additional Information: No value filed.    Anesthesia Type:   MAC     Note:    Oropharynx: oropharynx clear of all foreign objects and spontaneously breathing  Level of Consciousness: drowsy  Oxygen Supplementation: room air    Independent Airway: airway patency satisfactory and stable  Dentition: dentition unchanged  Vital Signs Stable: post-procedure vital signs reviewed and stable  Report to RN Given: handoff report given  Patient transferred to: Phase II    Handoff Report: Identifed the Patient, Identified the Reponsible Provider, Reviewed the pertinent medical history, Discussed the surgical course, Reviewed Intra-OP anesthesia mangement and issues during anesthesia, Set expectations for post-procedure period and Allowed opportunity for questions and acknowledgement of understanding      Vitals:  Vitals Value Taken Time   BP     Temp     Pulse 56 04/13/22 0845   Resp     SpO2         Electronically Signed By: SAILAJA Mensah CRNA  April 13, 2022  8:53 AM

## 2022-04-13 NOTE — ANESTHESIA POSTPROCEDURE EVALUATION
Patient: Parker Acevedo    Procedure: Procedure(s):  COLONOSCOPY       Anesthesia Type:  MAC    Note:  Disposition: Outpatient   Postop Pain Control: Uneventful            Sign Out: Well controlled pain   PONV: No   Neuro/Psych: Uneventful            Sign Out: Acceptable/Baseline neuro status   Airway/Respiratory: Uneventful            Sign Out: Acceptable/Baseline resp. status   CV/Hemodynamics: Uneventful            Sign Out: Acceptable CV status   Other NRE: NONE   DID A NON-ROUTINE EVENT OCCUR? No           Last vitals:  Vitals Value Taken Time   /66 04/13/22 0910   Temp 36.1  C (97  F) 04/13/22 0855   Pulse     Resp 14 04/13/22 0910   SpO2 100 % 04/13/22 0910       Electronically Signed By: John Galvan MD  April 13, 2022  2:40 PM

## 2022-04-13 NOTE — OR NURSING
Pt rescheduled for colonoscopy at [facility] with Dr Laurent tomorrow. Reviewed instructions for another bowel prep this afternoon with pt and wife, including remaining on clear liquid diet. They are in agreement with this plan and will check in at hospital at 10:30am tomorrow.

## 2022-04-13 NOTE — PROGRESS NOTES
This is a recent snapshot of the patient's [facility] medical record.  For current drug dose and complete information and questions, call [phone] or In Basket pool, [facility] ([id number])  CSN Number:  [id number]

## 2022-04-13 NOTE — ANESTHESIA PREPROCEDURE EVALUATION
Anesthesia Pre-Procedure Evaluation    Patient: Parker Acevedo   MRN: [id number] : 1970        Procedure : Procedure(s):  COLONOSCOPY          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)     mild EF ~45% on rest 17, improves with stressing     Chronic abdominal pain      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     Complication of anesthesia      Difficulty swallowing      Gastric ulcer, unspecified as acute or chronic, without mention of hemorrhage, perforation, or obstruction      Gastro-oesophageal reflux disease      Head injury      Hiatal hernia      Other bladder disorder      Other chronic pain      PONV (postoperative nausea and vomiting)      Severe malnutrition (H)     TPN     Short gut syndrome      Tobacco abuse       Past Surgical History:   Procedure Laterality Date     AMPUTATION       APPENDECTOMY       BACK SURGERY  11/3/2014    curve in the spine     BIOPSY LYMPH NODE CERVICAL N/A 2015    Procedure: BIOPSY LYMPH NODE CERVICAL;  Surgeon: Baron Scanlon MD;  [facility]     CHOLECYSTECTOMY       COLONOSCOPY N/A 2021    Procedure: COLONOSCOPY;  Surgeon: Jimbo Estrada MD;  Location: [facility] OR     DISCECTOMY, FUSION CERVICAL ANTERIOR ONE LEVEL, COMBINED N/A 2/15/2017    Procedure: COMBINED DISCECTOMY, FUSION CERVICAL ANTERIOR ONE LEVEL;  Surgeon: Darren Campos MD;  [facility]     ENDOSCOPIC INSERTION TUBE GASTROSTOMY  2013    Procedure: ENDOSCOPIC INSERTION TUBE GASTROSTOMY;;  Surgeon: Francis Vyas MD;  [facility]     ENDOSCOPIC ULTRASOUND UPPER GASTROINTESTINAL TRACT (GI)  2011    Procedure:ENDOSCOPIC ULTRASOUND UPPER GASTROINTESTINAL TRACT (GI); Both Procedures done Conjointly; Surgeon:NEREIDA HOUSER; [facility]     ENDOSCOPIC ULTRASOUND UPPER GASTROINTESTINAL TRACT (GI)  2013     Procedure: ENDOSCOPIC ULTRASOUND UPPER GASTROINTESTINAL TRACT (GI);  Endoscopic Ultrasound Guide Gastrostomy Tube Placement  C-arm;  Surgeon: Noe Lizarraga MD;  [facility]     ENDOSCOPIC ULTRASOUND UPPER GASTROINTESTINAL TRACT (GI) N/A 2/24/2021    Procedure: ENDOSCOPIC ULTRASOUND, ESOPHAGOSCOPY / UPPER GASTROINTESTINAL TRACT (GI), esophagastrogastroduodenoscopy;  Surgeon: Berny Bach MD;  [facility]     ENDOSCOPY  03/25/11    EGD, [facility]     ENDOSCOPY  08/04/09    Upper Endoscopy, [facility]     ENDOSCOPY  01/05/09    Upper Endoscopy, [facility]     ESOPHAGOSCOPY, GASTROSCOPY, DUODENOSCOPY (EGD), COMBINED  4/20/2011    Procedure:COMBINED ESOPHAGOSCOPY, GASTROSCOPY, DUODENOSCOPY (EGD); Surgeon:BLU VYAS; [facility]     ESOPHAGOSCOPY, GASTROSCOPY, DUODENOSCOPY (EGD), COMBINED  6/15/2011    Procedure:COMBINED ESOPHAGOSCOPY, GASTROSCOPY, DUODENOSCOPY (EGD); Surgeon:BLU VYAS; [facility]     ESOPHAGOSCOPY, GASTROSCOPY, DUODENOSCOPY (EGD), COMBINED  6/12/2013    Procedure: COMBINED ESOPHAGOSCOPY, GASTROSCOPY, DUODENOSCOPY (EGD);;  Surgeon: Blu Vyas MD;  [facility]     ESOPHAGOSCOPY, GASTROSCOPY, DUODENOSCOPY (EGD), COMBINED  11/22/2013    Procedure: COMBINED ESOPHAGOSCOPY, GASTROSCOPY, DUODENOSCOPY (EGD);;  Surgeon: Blu Vyas MD;  [facility]     ESOPHAGOSCOPY, GASTROSCOPY, DUODENOSCOPY (EGD), COMBINED  4/30/2014    Procedure: COMBINED ESOPHAGOSCOPY, GASTROSCOPY, DUODENOSCOPY (EGD);  Surgeon: Blu Vyas MD;  [facility]     ESOPHAGOSCOPY, GASTROSCOPY, DUODENOSCOPY (EGD), COMBINED N/A 2/20/2015    Procedure: COMBINED ESOPHAGOSCOPY, GASTROSCOPY, DUODENOSCOPY (EGD), BIOPSY SINGLE OR MULTIPLE;  Surgeon: Baron Scanlon MD;  Location:  OR     ESOPHAGOSCOPY, GASTROSCOPY, DUODENOSCOPY (EGD), COMBINED N/A 9/30/2015    Procedure: COMBINED ESOPHAGOSCOPY, GASTROSCOPY, DUODENOSCOPY (EGD);   Surgeon: Francis Vyas MD;  Location:  GI     ESOPHAGOSCOPY, GASTROSCOPY, DUODENOSCOPY (EGD), COMBINED N/A 10/3/2019    Procedure: Upper Endoscopy;  Surgeon: Clif Morrow MD;  [facility]     GASTRECTOMY  6/22/2012    Procedure: GASTRECTOMY;  Open Approach, Excise Ulcers,Partial Gastrectomy, Esophagojejunostomy, Hiatal Hernia Repair, Extensive Lysis of Adhesions and Esaphagogastrodudenoscopy.;  Surgeon: Francis Vyas MD;  [facility]     GASTROJEJUNOSTOMY  08/26/09    Extensice enterolysis, partial resect. jejunum, part. resect gastric pouch, gastrojejunostomy anastomosis     HC ESOPH/GAS REFLUX TEST W NASAL IMPED ELECTRODE  8/5/2013    Procedure: ESOPHAGEAL IMPEDENCE FUNCTION TEST 1 HOUR OR LESS;  Surgeon: Halie Lang MD;  Location:  GI     HEAD & NECK SURGERY  2/15/2017    C5-C6     HERNIA REPAIR  2006    Umbilical hernia     HERNIORRHAPHY HIATAL  6/22/2012    Procedure: HERNIORRHAPHY HIATAL;;  Surgeon: Francis Vyas MD;  [facility]     HERNIORRHAPHY INGUINAL  11/22/2013    Procedure: HERNIORRHAPHY INGUINAL;;  Surgeon: Francis Vyas MD;  [facility]     INSERT PICC LINE Right 12/19/2019    Procedure: Picc Placement;  Surgeon: Per Dumont PA-C;  [facility]     INSERT PICC LINE Right 2/21/2020    Procedure: INSERTION, PICC;  Surgeon: Per Dumont PA-C;  [facility]     INSERT PORT VASCULAR ACCESS Right 12/19/2017    Procedure: INSERT PORT VASCULAR ACCESS;  Right Chest Port Placement ;  Surgeon: Lisandro Alejandro PA-C;  [facility]     INSERT PORT VASCULAR ACCESS Right 8/2/2018    Procedure: INSERT PORT VASCULAR ACCESS;  Place single lumen tunneled central venous access catheter;  Surgeon: Guy Jamil PA-C;  [facility]     IR CVC TUNNEL PLACEMENT > 5 YRS OF AGE  8/7/2019     IR CVC TUNNEL PLACEMENT > 5 YRS OF AGE  4/14/2020     IR CVC TUNNEL PLACEMENT > 5 YRS OF AGE  8/3/2020     IR CVC TUNNEL  PLACEMENT > 5 YRS OF AGE  9/4/2020     IR CVC TUNNEL PLACEMENT > 5 YRS OF AGE  2/5/2021     IR CVC TUNNEL PLACEMENT > 5 YRS OF AGE  3/23/2021     IR CVC TUNNEL PLACEMENT > 5 YRS OF AGE  1/13/2022     IR CVC TUNNEL REMOVAL RIGHT  10/1/2019     IR CVC TUNNEL REMOVAL RIGHT  7/30/2020     IR CVC TUNNEL REMOVAL RIGHT  9/2/2020     IR CVC TUNNEL REMOVAL RIGHT  2/3/2021     IR CVC TUNNEL REMOVAL RIGHT  3/19/2021     IR CVC TUNNEL REMOVAL RIGHT  1/10/2022     IR CVC TUNNEL REVISION RIGHT  5/7/2021     IR FOLLOW UP VISIT OUTPATIENT  8/7/2019     IR PICC PLACEMENT > 5 YRS OF AGE  3/7/2019     IR PICC PLACEMENT > 5 YRS OF AGE  12/19/2019     IR PICC PLACEMENT > 5 YRS OF AGE  2/21/2020     LAPAROTOMY EXPLORATORY  11/22/2013    Procedure: LAPAROTOMY EXPLORATORY;  Exploratory Laparotomy, Upper Endoscopy, Left Inguinal Hernia Repair;  Surgeon: Francis Vyas MD;  [facility]     ORTHOPEDIC SURGERY       PICC INSERTION Right 03/16/2017    5fr DL BioFlo PICC, 42cm (3cm external) in the R medial brachial vein w/ tip in the SVC RA junction.     PICC INSERTION Left 09/23/2017    5fr DL BioFlo PICC, 45cm (1cm external) in the L basilic vein w/ tip in the SVC RA junction.     PICC INSERTION Right 05/16/2019    5Fr - 43cm, Medial brachial vein, low SVC     PICC INSERTION Right 10/02/2019    5Fr - 43cm (2cm external), basilic vein, low SVC     SHAYLEE EN Y BOWEL  2003     SOFT TISSUE SURGERY       THORACIC SURGERY       TONSILLECTOMY       TRANSESOPHAGEAL ECHOCARDIOGRAM INTRAOPERATIVE N/A 1/8/2019    Procedure: TRANSESOPHAGEAL ECHOCARDIOGRAM INTRAOPERATIVE;  Surgeon: GENERIC ANESTHESIA PROVIDER;  [facility]     ZZC GASTRIC BYPASS,OBESE<100CM SHAYLEE-EN-Y  2002    lost 300 pounds      Allergies   Allergen Reactions     Bactrim [Sulfamethoxazole W/Trimethoprim] Rash     Penicillins Anaphylaxis     Please see Antimicrobial Management Team allergy assessment note 10/10/2018. Patient reported tolerating amoxicillin.     Doxycycline  Rash     Vancomycin Rash     Rash after receiving vancomycin 3/28/16 (infusion reaction?). Tolerated with slower infusion and diphenhydramine premed.      Social History     Tobacco Use     Smoking status: Light Tobacco Smoker     Packs/day: 0.10     Years: 3.00     Pack years: 0.30     Types: Cigarettes     Smokeless tobacco: Never Used     Tobacco comment: 2/4/2021    smokes 3 cigarettes/day   Substance Use Topics     Alcohol use: No     Comment: quit 2002      Wt Readings from Last 1 Encounters:   04/13/22 86.2 kg (190 lb)        Anesthesia Evaluation   Pt has had prior anesthetic.     History of anesthetic complications  - PONV.      ROS/MED HX  ENT/Pulmonary:       Neurologic:       Cardiovascular:       METS/Exercise Tolerance:     Hematologic:       Musculoskeletal:       GI/Hepatic:     (+) GERD, hiatal hernia,     Renal/Genitourinary:       Endo:     (+) Obesity,     Psychiatric/Substance Use:     (+) psychiatric history anxiety     Infectious Disease:       Malignancy:       Other:      (+) , H/O Chronic Pain,        Physical Exam    Airway  airway exam normal      Mallampati: II   TM distance: > 3 FB   Neck ROM: full   Mouth opening: > 3 cm    Respiratory Devices and Support         Dental  no notable dental history         Cardiovascular          Rhythm and rate: regular and normal     Pulmonary   pulmonary exam normal        breath sounds clear to auscultation           OUTSIDE LABS:  CBC:   Lab Results   Component Value Date    WBC 5.2 04/12/2022    WBC 8.2 04/06/2022    HGB 10.3 (L) 04/12/2022    HGB 10.0 (L) 04/06/2022    HCT 32.3 (L) 04/12/2022    HCT 31.5 (L) 04/06/2022     04/12/2022     (L) 04/06/2022     BMP:   Lab Results   Component Value Date     04/12/2022     04/06/2022    POTASSIUM 3.7 04/12/2022    POTASSIUM 3.5 04/06/2022    CHLORIDE 113 (H) 04/12/2022    CHLORIDE 108 04/06/2022    CO2 28 04/12/2022    CO2 28 04/06/2022    BUN 19 04/12/2022    BUN 14 04/06/2022     CR 0.64 (L) 04/12/2022    CR 0.72 04/06/2022    GLC 82 04/12/2022    GLC 82 04/06/2022     COAGS:   Lab Results   Component Value Date     (HH) 02/25/2022    INR 1.04 02/25/2022    FIBR 324 10/25/2016     POC:   Lab Results   Component Value Date    BGM 83 03/23/2021     HEPATIC:   Lab Results   Component Value Date    ALBUMIN 3.1 (L) 04/12/2022    PROTTOTAL 6.0 (L) 04/12/2022    ALT 23 04/12/2022    AST 14 04/12/2022    ALKPHOS 47 04/12/2022    BILITOTAL 0.3 04/12/2022    BILITOTAL 0.3 04/12/2022     OTHER:   Lab Results   Component Value Date    LACT 1.0 02/24/2022    A1C 4.9 07/23/2013    SILAS 7.9 (L) 04/12/2022    PHOS 2.6 04/12/2022    MAG 2.1 04/12/2022    LIPASE 58 (L) 09/29/2019    AMYLASE 178 (H) 06/28/2012    TSH 1.16 05/12/2019    CRP <2.9 03/15/2022    SED 10 02/23/2022       Anesthesia Plan    ASA Status:  3   NPO Status:  NPO Appropriate    Anesthesia Type: MAC.     - Reason for MAC: immobility needed, straight local not clinically adequate   Induction: Intravenous.   Maintenance: TIVA.        Consents    Anesthesia Plan(s) and associated risks, benefits, and realistic alternatives discussed. Questions answered and patient/representative(s) expressed understanding.    - Discussed:     - Discussed with:  Patient      - Extended Intubation/Ventilatory Support Discussed: No.      - Patient is DNR/DNI Status: No    Use of blood products discussed: No .     Postoperative Care    Pain management: IV analgesics, Oral pain medications, Multi-modal analgesia.   PONV prophylaxis: Ondansetron (or other 5HT-3), Background Propofol Infusion     Comments:                John Galvan MD

## 2022-04-14 ENCOUNTER — VIRTUAL VISIT (OUTPATIENT)
Dept: INFECTIOUS DISEASES | Facility: CLINIC | Age: 52
End: 2022-04-14
Attending: INTERNAL MEDICINE
Payer: COMMERCIAL

## 2022-04-14 DIAGNOSIS — Z86.19 HISTORY OF CENTRAL LINE-ASSOCIATED BLOODSTREAM INFECTION (CLABSI): Primary | ICD-10-CM

## 2022-04-14 PROCEDURE — 99213 OFFICE O/P EST LOW 20 MIN: CPT | Mod: 95 | Performed by: INTERNAL MEDICINE

## 2022-04-14 NOTE — PROGRESS NOTES
Parker is a 51 year old who is being evaluated via a billable video visit.      How would you like to obtain your AVS? MyChart  If the video visit is dropped, the invitation should be resent by: Text to cell phone: [phone]  Will anyone else be joining your video visit? No      Video-Visit Details    Type of service:  Video Visit    Video Start Time: 11:44  Video End Time:11:56 AM    Originating Location (pt. Location): Home    Distant Location (provider location):  [facility] [place]     Platform used for Video Visit: [facility] Follow-Up Visit - Video Visit  Date of Visit:  04/14/2022         History of Present Illness:   Patient is a 52 yo M with a history of bariatric surgery on chronic TPN, recurrent CLABSI, short-gut syndrome, and a hx of repeated line-related bloodstream infections with multiple different organisms usually requiring line removal and replacement. A partial list from the past few years is included below:      Summary of Recent Hospitalizations:  1/2022 - CoNS x 2 species  3/2021 - Streptococcus bovis  7/28-8/3/20 - treated for MRSE bactermia secondary to central venous catheter; repeat dual-lumen Cm replaced  July 2020 - treated for Corynebacterium infection with Vancomycin  11/4/19 - peripheral cultures negative; but initial cultures grew Corneybacterium and Peptostreptococcus which were deemed contaminants.  10/4/19 - Strep, Staph, and Granulicatella adiacens bactermia; Cm replaced on 10/1  5/16/19 - blood draw from PICC grew Rothia mucilaginosa  1/9/19 - Candidemia sepsis due to PICC line infection.    Earlier in April he did have one day of fevers and requested blood cultures, which were negative. His wife, Rose, is mostly the one in view during the video appointment today and answers most of the questions. She reports that he has been having some fatigue, especially prominent today after a failed attempt at  a colonoscopy yesterday with plans to repeat today. The colonoscopy is in response to an episode of S bovis bacteremia he had in March 2021. No further fevers. Possible small ring of erythema around Cm, no drainage. He is currently being treated for a line-related clot.     Objective:   GENERAL: Healthy, alert and no distress  RESP: No audible wheeze, cough.  Able to speak fully in complete sentences.  NEURO: Mentation intact and speech normal  PSYCH: Somewhat slow speech and muted affect, but difficult to fully ascertain due to poor video connection with long delay.     4/6 blood cultures negative  4/9 COVID negative    Assessment/Plan:   1. Hisory of intravascular non-hemodialysis catheter-related infection - currently being treated for clot but with recent negative blood cultures so no evidence of current infection  2. Continue low threshold for repeating blood cultures given history of infections  3. Await results from colonoscopy being done due to S bovis bacteremia identified 1 year ago    Return to ID clinic PRN.    Марина Buckner MD  Division of Infectious Diseases and International Medicine  Pager: [phone]          ________________________________________________________________

## 2022-04-14 NOTE — LETTER
4/14/2022       RE: Parker Acevedo  [address]  [facility] [address]     Dear Colleague,    Thank you for referring your patient, Parker Acevedo, to the [facility] [place] at [facility]. Please see a copy of my visit note below.    Parker is a 51 year old who is being evaluated via a billable video visit.      How would you like to obtain your AVS? MyChart  If the video visit is dropped, the invitation should be resent by: Text to cell phone: [phone]  Will anyone else be joining your video visit? No      Video-Visit Details    Type of service:  Video Visit    Video Start Time: 11:44  Video End Time:11:56 AM    Originating Location (pt. Location): Home    Distant Location (provider location):  [facility] [place]     Platform used for Video Visit: [facility] Follow-Up Visit - Video Visit  Date of Visit:  04/14/2022         History of Present Illness:   Patient is a 52 yo M with a history of bariatric surgery on chronic TPN, recurrent CLABSI, short-gut syndrome, and a hx of repeated line-related bloodstream infections with multiple different organisms usually requiring line removal and replacement. A partial list from the past few years is included below:      Summary of Recent Hospitalizations:  1/2022 - CoNS x 2 species  3/2021 - Streptococcus bovis  7/28-8/3/20 - treated for MRSE bactermia secondary to central venous catheter; repeat dual-lumen Cm replaced  July 2020 - treated for Corynebacterium infection with Vancomycin  11/4/19 - peripheral cultures negative; but initial cultures grew Corneybacterium and Peptostreptococcus which were deemed contaminants.  10/4/19 - Strep, Staph, and Granulicatella adiacens bactermia; Cm replaced on 10/1  5/16/19 - blood draw from PICC grew Rothia mucilaginosa  1/9/19 - Candidemia sepsis due to PICC line  infection.    Earlier in April he did have one day of fevers and requested blood cultures, which were negative. His wife, Rose, is mostly the one in view during the video appointment today and answers most of the questions. She reports that he has been having some fatigue, especially prominent today after a failed attempt at a colonoscopy yesterday with plans to repeat today. The colonoscopy is in response to an episode of S bovis bacteremia he had in March 2021. No further fevers. Possible small ring of erythema around Cm, no drainage. He is currently being treated for a line-related clot.     Objective:   GENERAL: Healthy, alert and no distress  RESP: No audible wheeze, cough.  Able to speak fully in complete sentences.  NEURO: Mentation intact and speech normal  PSYCH: Somewhat slow speech and muted affect, but difficult to fully ascertain due to poor video connection with long delay.     4/6 blood cultures negative  4/9 COVID negative    Assessment/Plan:   1. Hisory of intravascular non-hemodialysis catheter-related infection - currently being treated for clot but with recent negative blood cultures so no evidence of current infection  2. Continue low threshold for repeating blood cultures given history of infections  3. Await results from colonoscopy being done due to S bovis bacteremia identified 1 year ago    Return to ID clinic PRN.    Марина Buckner MD  Division of Infectious Diseases and International Medicine  Pager: [phone]          ________________________________________________________________

## 2022-04-15 NOTE — PROGRESS NOTES
This is a recent snapshot of the patient's [facility] medical record.  For current drug dose and complete information and questions, call [phone] or In Basket pool, fv home infusion ([id number])  CSN Number:  [id number]

## 2022-04-18 ENCOUNTER — MYC REFILL (OUTPATIENT)
Dept: PALLIATIVE MEDICINE | Facility: CLINIC | Age: 52
End: 2022-04-18

## 2022-04-18 ENCOUNTER — HOSPITAL ENCOUNTER (OUTPATIENT)
Facility: AMBULATORY SURGERY CENTER | Age: 52
End: 2022-04-18
Attending: INTERNAL MEDICINE | Admitting: INTERNAL MEDICINE
Payer: COMMERCIAL

## 2022-04-18 ENCOUNTER — TELEPHONE (OUTPATIENT)
Dept: GASTROENTEROLOGY | Facility: CLINIC | Age: 52
End: 2022-04-18

## 2022-04-18 DIAGNOSIS — Z11.59 ENCOUNTER FOR SCREENING FOR OTHER VIRAL DISEASES: Primary | ICD-10-CM

## 2022-04-18 DIAGNOSIS — G89.29 CHRONIC ABDOMINAL PAIN: ICD-10-CM

## 2022-04-18 DIAGNOSIS — R10.9 CHRONIC ABDOMINAL PAIN: ICD-10-CM

## 2022-04-18 DIAGNOSIS — G89.4 CHRONIC PAIN SYNDROME: ICD-10-CM

## 2022-04-18 NOTE — TELEPHONE ENCOUNTER
05-23   Sean   [facility]   145pm Arrival    Ph covid test      Staff message:    ----- Message from Kevin Fairchild RN sent at 4/15/2022  8:17 AM CDT -----  Regarding: RE: INCOMPLETE PROCEDURE 04/13  Thisa, probably should be with Dr Estrada as Dr Estrada did the first scope but had to be rescheduled next day with mayank due to poor prep  Make sure he has a two day prep   Thanks kevin   ----- Message -----  From: Lavelle Benítez  Sent: 4/13/2022   5:23 PM CDT  To: Kevin Fairchild RN  Subject: RE: INCOMPLETE PROCEDURE 04/13                   Patient is looking to reschedule it due to no ride.     Okay next avail with any provider?    T  ----- Message -----  From: Kevin Fairchild RN  Sent: 4/13/2022   5:04 PM CDT  To: Lavelle Benítez  Subject: RE: INCOMPLETE PROCEDURE 04/13                   Yes this patient is to be with Dr Laurent at the [facility] 4/14  ----- Message -----  From: Lavelle Benítez  Sent: 4/13/2022   4:00 PM CDT  To: Kevin Fairchild RN  Subject: INCOMPLETE PROCEDURE 04/13                         Hello,      They are looking to rescheduled sooner as they are unable to come to procedure as their ride has appointments all day. Looking for next avail with soonest avail.          Thanks.      Lavelle

## 2022-04-20 ENCOUNTER — HOME INFUSION (PRE-WILLOW HOME INFUSION) (OUTPATIENT)
Dept: PHARMACY | Facility: CLINIC | Age: 52
End: 2022-04-20
Payer: COMMERCIAL

## 2022-04-20 ENCOUNTER — PATIENT OUTREACH (OUTPATIENT)
Dept: GASTROENTEROLOGY | Facility: CLINIC | Age: 52
End: 2022-04-20
Payer: COMMERCIAL

## 2022-04-20 DIAGNOSIS — R11.0 NAUSEA: ICD-10-CM

## 2022-04-20 NOTE — TELEPHONE ENCOUNTER
Medication refill information reviewed.   Appointment 5/6/22 with SAILAJA Matson, AWAIS     Due date for   fentaNYL (DURAGESIC) 25 mcg/hr 72 hr patch & oxyCODONE (ROXICODONE) 5 MG/5ML solution     Both last dispensed from pharmacy on 3/31/22 is 5/2/22     Prescriptions prepped for review.     Will route to provider.       Dilma Wall RN, BSN, CMSRN  RN Care Coordinator  [facility]

## 2022-04-20 NOTE — TELEPHONE ENCOUNTER
Received [facility] message from patient requesting refill(s) for    fentaNYL (DURAGESIC) 25 mcg/hr 72 hr patch & oxyCODONE (ROXICODONE) 5 MG/5ML solution    Both last dispensed from pharmacy on 3/31/22    Patient's last office/virtual visit by prescribing provider on 2/9/22  Next office/virtual appointment scheduled for 5/6/22    Last urine drug screen date 1/5/22  Current opioid agreement on file? Yes Date of opioid agreement: 1/5/22    E-prescribe to:    [facility] [place] - [address]    Will route to nursing pool for review and preparation of prescription(s).

## 2022-04-20 NOTE — TELEPHONE ENCOUNTER
Routed to the nursing pool and to the MA pool to process refill(s).  Srini to Josie on 5/6/22.    Demetrice Yeh, RN-BSN  [facility]

## 2022-04-21 ENCOUNTER — TELEPHONE (OUTPATIENT)
Dept: PALLIATIVE MEDICINE | Facility: CLINIC | Age: 52
End: 2022-04-21

## 2022-04-21 DIAGNOSIS — G89.4 CHRONIC PAIN SYNDROME: ICD-10-CM

## 2022-04-21 DIAGNOSIS — R10.9 CHRONIC ABDOMINAL PAIN: ICD-10-CM

## 2022-04-21 DIAGNOSIS — G89.29 CHRONIC ABDOMINAL PAIN: ICD-10-CM

## 2022-04-21 RX ORDER — FENTANYL 25 UG/1
1 PATCH TRANSDERMAL
Qty: 15 PATCH | Refills: 0 | Status: SHIPPED | OUTPATIENT
Start: 2022-04-21 | End: 2022-04-21

## 2022-04-21 RX ORDER — ONDANSETRON 8 MG/1
TABLET, ORALLY DISINTEGRATING ORAL
Qty: 90 TABLET | Refills: 1 | Status: SHIPPED | OUTPATIENT
Start: 2022-04-21 | End: 2022-11-01

## 2022-04-21 RX ORDER — FENTANYL 25 UG/1
1 PATCH TRANSDERMAL
Qty: 15 PATCH | Refills: 0 | Status: SHIPPED | OUTPATIENT
Start: 2022-04-21 | End: 2022-05-27

## 2022-04-21 RX ORDER — OXYCODONE HCL 5 MG/5 ML
5-10 SOLUTION, ORAL ORAL 3 TIMES DAILY PRN
Qty: 900 ML | Refills: 0 | Status: SHIPPED | OUTPATIENT
Start: 2022-04-21 | End: 2022-04-21

## 2022-04-21 RX ORDER — OXYCODONE HCL 5 MG/5 ML
5-10 SOLUTION, ORAL ORAL 3 TIMES DAILY PRN
Qty: 900 ML | Refills: 0 | Status: SHIPPED | OUTPATIENT
Start: 2022-04-21 | End: 2022-05-27

## 2022-04-21 NOTE — TELEPHONE ENCOUNTER
Signed Prescriptions:                        Disp   Refills    oxyCODONE (ROXICODONE) 5 MG/5ML solution   900 mL 0        Sig: Take 5-10 mLs (5-10 mg) by mouth 3 times daily as           needed for pain Max of 30mg/day. Put at least 4           hours between doses. Fill 04/29/22 and start           05/02/22. 30 day supply. MUST BE SEEN IN CLINIC           PRIOR TO FUTURE REFILLS  Authorizing Provider: NATALIE MCDOWELL    fentaNYL (DURAGESIC) 25 mcg/hr 72 hr patch 15 pat*0        Sig: Place 1 patch onto the skin every 48 hours remove old           patch. Fill Fill 04/29/22 and start 05/02/22.           MUST BE SEEN IN CLINIC PRIOR TO FUTURE REFILLS  Authorizing Provider: NATALIE MCDOWELL    Reviewed [place]  April 21, 2022- no concerning fills.    Natalie MENARD, RN CNP, FNP  [facility]  [facility]

## 2022-04-21 NOTE — TELEPHONE ENCOUNTER
Discussed with wife of patient the importance of the patient completing the two day prep  Patient is rescheduled on May 23

## 2022-04-21 NOTE — PROGRESS NOTES
This is a recent snapshot of the patient's [facility] medical record.  For current drug dose and complete information and questions, call [phone] or In Basket pool, fv home infusion ([id number])  CSN Number:  [id number]

## 2022-04-21 NOTE — TELEPHONE ENCOUNTER
Need Oxycodone and Fentanyl resent to say ok to fill 04-29 because we are closed Sat 04-30 so please resend with correct fill date  Thanks!  Sharon Fair, Pharmacy [facility] [place] [phone]

## 2022-04-21 NOTE — TELEPHONE ENCOUNTER
Prescription approved per [facility] Refill Protocol.      Angel Thomas RN

## 2022-04-21 NOTE — TELEPHONE ENCOUNTER
Signed Prescriptions:                        Disp   Refills    oxyCODONE (ROXICODONE) 5 MG/5ML solution   900 mL 0        Sig: Take 5-10 mLs (5-10 mg) by mouth 3 times daily as           needed for pain Max of 30mg/day. Put at least 4           hours between doses. Fill 04/30/22 and           start05/02/22. 30 day supply. MUST BE SEEN IN           CLINIC PRIOR TO FUTURE REFILLS  Authorizing Provider: NATALIE MCDOWELL    fentaNYL (DURAGESIC) 25 mcg/hr 72 hr patch 15 pat*0        Sig: Place 1 patch onto the skin every 48 hours remove old           patch. Fill Fill 04/30/22 and start 05/02/22.           MUST BE SEEN IN CLINIC PRIOR TO FUTURE REFILLS  Authorizing Provider: NATALIE MCDOWELL    Reviewed [place]  April 21, 2022- no concerning fills.  Please remind patient that he must be seen in his clinic visit scheduled in May prior to future opiate refills. Thanks.   Natalie MENARD, RN CNP, FNP  [facility]  [facility]

## 2022-04-26 ENCOUNTER — LAB REQUISITION (OUTPATIENT)
Dept: LAB | Facility: CLINIC | Age: 52
End: 2022-04-26
Payer: COMMERCIAL

## 2022-04-26 DIAGNOSIS — T80.211D BLOODSTREAM INFECTION DUE TO CENTRAL VENOUS CATHETER, SUBSEQUENT ENCOUNTER: ICD-10-CM

## 2022-04-26 LAB
ALBUMIN SERPL-MCNC: 3.3 G/DL (ref 3.4–5)
ALP SERPL-CCNC: 62 U/L (ref 40–150)
ALT SERPL W P-5'-P-CCNC: 36 U/L (ref 0–70)
ANION GAP SERPL CALCULATED.3IONS-SCNC: 5 MMOL/L (ref 3–14)
AST SERPL W P-5'-P-CCNC: 14 U/L (ref 0–45)
BASOPHILS # BLD AUTO: 0 10E3/UL (ref 0–0.2)
BASOPHILS NFR BLD AUTO: 1 %
BILIRUB DIRECT SERPL-MCNC: <0.1 MG/DL (ref 0–0.2)
BILIRUB SERPL-MCNC: 0.4 MG/DL (ref 0.2–1.3)
BILIRUB SERPL-MCNC: 0.4 MG/DL (ref 0.2–1.3)
BUN SERPL-MCNC: 8 MG/DL (ref 7–30)
CALCIUM SERPL-MCNC: 8.3 MG/DL (ref 8.5–10.1)
CHLORIDE BLD-SCNC: 109 MMOL/L (ref 94–109)
CO2 SERPL-SCNC: 30 MMOL/L (ref 20–32)
CREAT SERPL-MCNC: 0.73 MG/DL (ref 0.66–1.25)
EOSINOPHIL # BLD AUTO: 0.1 10E3/UL (ref 0–0.7)
EOSINOPHIL NFR BLD AUTO: 1 %
ERYTHROCYTE [DISTWIDTH] IN BLOOD BY AUTOMATED COUNT: 14.9 % (ref 10–15)
FASTING STATUS PATIENT QL REPORTED: NORMAL
GFR SERPL CREATININE-BSD FRML MDRD: >90 ML/MIN/1.73M2
GLUCOSE BLD-MCNC: 79 MG/DL (ref 70–99)
HCT VFR BLD AUTO: 39.7 % (ref 40–53)
HGB BLD-MCNC: 13.1 G/DL (ref 13.3–17.7)
IMM GRANULOCYTES # BLD: 0 10E3/UL
IMM GRANULOCYTES NFR BLD: 0 %
LYMPHOCYTES # BLD AUTO: 2 10E3/UL (ref 0.8–5.3)
LYMPHOCYTES NFR BLD AUTO: 31 %
MAGNESIUM SERPL-MCNC: 2.2 MG/DL (ref 1.6–2.3)
MCH RBC QN AUTO: 30.8 PG (ref 26.5–33)
MCHC RBC AUTO-ENTMCNC: 33 G/DL (ref 31.5–36.5)
MCV RBC AUTO: 93 FL (ref 78–100)
MONOCYTES # BLD AUTO: 0.6 10E3/UL (ref 0–1.3)
MONOCYTES NFR BLD AUTO: 9 %
NEUTROPHILS # BLD AUTO: 3.9 10E3/UL (ref 1.6–8.3)
NEUTROPHILS NFR BLD AUTO: 58 %
NRBC # BLD AUTO: 0 10E3/UL
NRBC BLD AUTO-RTO: 0 /100
PHOSPHATE SERPL-MCNC: 3.6 MG/DL (ref 2.5–4.5)
PLATELET # BLD AUTO: 242 10E3/UL (ref 150–450)
POTASSIUM BLD-SCNC: 3.7 MMOL/L (ref 3.4–5.3)
PROT SERPL-MCNC: 6.5 G/DL (ref 6.8–8.8)
RBC # BLD AUTO: 4.25 10E6/UL (ref 4.4–5.9)
SODIUM SERPL-SCNC: 144 MMOL/L (ref 133–144)
TRIGL SERPL-MCNC: 144 MG/DL
WBC # BLD AUTO: 6.6 10E3/UL (ref 4–11)

## 2022-04-26 PROCEDURE — 80053 COMPREHEN METABOLIC PANEL: CPT | Performed by: SURGERY

## 2022-04-26 PROCEDURE — 85025 COMPLETE CBC W/AUTO DIFF WBC: CPT | Performed by: SURGERY

## 2022-04-26 PROCEDURE — 83735 ASSAY OF MAGNESIUM: CPT | Performed by: SURGERY

## 2022-04-26 PROCEDURE — 82248 BILIRUBIN DIRECT: CPT | Performed by: SURGERY

## 2022-04-26 PROCEDURE — 84478 ASSAY OF TRIGLYCERIDES: CPT | Performed by: SURGERY

## 2022-04-26 PROCEDURE — 84100 ASSAY OF PHOSPHORUS: CPT | Performed by: SURGERY

## 2022-04-27 ENCOUNTER — HOME INFUSION (PRE-WILLOW HOME INFUSION) (OUTPATIENT)
Dept: PHARMACY | Facility: CLINIC | Age: 52
End: 2022-04-27

## 2022-04-29 NOTE — PROGRESS NOTES
This is a recent snapshot of the patient's [facility] medical record.  For current drug dose and complete information and questions, call [phone] or In Basket pool, fv home infusion ([id number])  CSN Number:  [id number]

## 2022-05-02 ENCOUNTER — MYC MEDICAL ADVICE (OUTPATIENT)
Dept: INTERNAL MEDICINE | Facility: CLINIC | Age: 52
End: 2022-05-02
Payer: COMMERCIAL

## 2022-05-02 DIAGNOSIS — F90.0 ADHD (ATTENTION DEFICIT HYPERACTIVITY DISORDER), INATTENTIVE TYPE: ICD-10-CM

## 2022-05-02 NOTE — PROGRESS NOTES
This is a recent snapshot of the patient's [facility] medical record.  For current drug dose and complete information and questions, call [phone] or In Basket pool, fv home infusion ([id number])  CSN Number:  [id number]

## 2022-05-02 NOTE — PROGRESS NOTES
This is a recent snapshot of the patient's [facility] medical record.  For current drug dose and complete information and questions, call [phone] or In Basket pool, fv home infusion ([id number])  CSN Number:  [id number]

## 2022-05-02 NOTE — PROGRESS NOTES
This is a recent snapshot of the patient's [facility] medical record.  For current drug dose and complete information and questions, call [phone] or In Basket pool, fv home infusion ([id number])  CSN Number:  [id number]

## 2022-05-02 NOTE — PROGRESS NOTES
This is a recent snapshot of the patient's [facility] medical record.  For current drug dose and complete information and questions, call [phone] or In Basket pool, fv home infusion ([id number])  CSN Number:  [id number]

## 2022-05-03 ENCOUNTER — HOME INFUSION (PRE-WILLOW HOME INFUSION) (OUTPATIENT)
Dept: PHARMACY | Facility: CLINIC | Age: 52
End: 2022-05-03
Payer: COMMERCIAL

## 2022-05-04 ENCOUNTER — HOME INFUSION (PRE-WILLOW HOME INFUSION) (OUTPATIENT)
Dept: PHARMACY | Facility: CLINIC | Age: 52
End: 2022-05-04
Payer: COMMERCIAL

## 2022-05-04 DIAGNOSIS — R50.9 CHILLS WITH FEVER: Primary | ICD-10-CM

## 2022-05-04 NOTE — PROGRESS NOTES
This is a recent snapshot of the patient's [facility] medical record.  For current drug dose and complete information and questions, call [phone] or In Basket pool, fv home infusion ([id number])  CSN Number:  [id number]

## 2022-05-05 ENCOUNTER — HOME INFUSION (PRE-WILLOW HOME INFUSION) (OUTPATIENT)
Dept: PHARMACY | Facility: CLINIC | Age: 52
End: 2022-05-05

## 2022-05-05 ENCOUNTER — LAB REQUISITION (OUTPATIENT)
Dept: LAB | Facility: CLINIC | Age: 52
DRG: 315 | End: 2022-05-05
Payer: COMMERCIAL

## 2022-05-05 DIAGNOSIS — R13.10 DYSPHAGIA, UNSPECIFIED: ICD-10-CM

## 2022-05-05 LAB
BASOPHILS # BLD AUTO: 0 10E3/UL (ref 0–0.2)
BASOPHILS NFR BLD AUTO: 0 %
EOSINOPHIL # BLD AUTO: 0.1 10E3/UL (ref 0–0.7)
EOSINOPHIL NFR BLD AUTO: 2 %
ERYTHROCYTE [DISTWIDTH] IN BLOOD BY AUTOMATED COUNT: 14.7 % (ref 10–15)
HCT VFR BLD AUTO: 34.3 % (ref 40–53)
HGB BLD-MCNC: 11.2 G/DL (ref 13.3–17.7)
HOLD SPECIMEN: NORMAL
HOLD SPECIMEN: NORMAL
IMM GRANULOCYTES # BLD: 0 10E3/UL
IMM GRANULOCYTES NFR BLD: 0 %
LYMPHOCYTES # BLD AUTO: 1.5 10E3/UL (ref 0.8–5.3)
LYMPHOCYTES NFR BLD AUTO: 22 %
MCH RBC QN AUTO: 31.3 PG (ref 26.5–33)
MCHC RBC AUTO-ENTMCNC: 32.7 G/DL (ref 31.5–36.5)
MCV RBC AUTO: 96 FL (ref 78–100)
MONOCYTES # BLD AUTO: 1.2 10E3/UL (ref 0–1.3)
MONOCYTES NFR BLD AUTO: 18 %
NEUTROPHILS # BLD AUTO: 4.1 10E3/UL (ref 1.6–8.3)
NEUTROPHILS NFR BLD AUTO: 58 %
NRBC # BLD AUTO: 0 10E3/UL
NRBC BLD AUTO-RTO: 0 /100
PLATELET # BLD AUTO: 146 10E3/UL (ref 150–450)
RBC # BLD AUTO: 3.58 10E6/UL (ref 4.4–5.9)
WBC # BLD AUTO: 7 10E3/UL (ref 4–11)

## 2022-05-05 PROCEDURE — 85025 COMPLETE CBC W/AUTO DIFF WBC: CPT | Performed by: INTERNAL MEDICINE

## 2022-05-05 PROCEDURE — 87149 DNA/RNA DIRECT PROBE: CPT | Performed by: INTERNAL MEDICINE

## 2022-05-05 PROCEDURE — 87077 CULTURE AEROBIC IDENTIFY: CPT | Performed by: INTERNAL MEDICINE

## 2022-05-05 NOTE — PROGRESS NOTES
This is a recent snapshot of the patient's [facility] medical record.  For current drug dose and complete information and questions, call [phone] or In Basket pool, fv home infusion ([id number])  CSN Number:  [id number]

## 2022-05-05 NOTE — PROGRESS NOTES
Patient's wife Rose called in stating that patient is having fevers. For the last 2 days he's had fevers of 101F. This evening his fever is at 102. I asked about whether his heart rate is high or blood pressure low. Rose denies fast heart rate, and he is thinking clearly.   She requests that I order blood cultures so they can have them drawn in the morning. She understands that if his clinical status gets worse she should bring him to the ED for evaluation.

## 2022-05-06 ENCOUNTER — TELEPHONE (OUTPATIENT)
Dept: INTERNAL MEDICINE | Facility: CLINIC | Age: 52
End: 2022-05-06

## 2022-05-06 RX ORDER — LORAZEPAM 1 MG/1
TABLET ORAL
Qty: 20 TABLET | Refills: 0 | Status: SHIPPED | OUTPATIENT
Start: 2022-05-06 | End: 2022-05-24

## 2022-05-06 RX ORDER — DEXTROAMPHETAMINE SACCHARATE, AMPHETAMINE ASPARTATE, DEXTROAMPHETAMINE SULFATE AND AMPHETAMINE SULFATE 5; 5; 5; 5 MG/1; MG/1; MG/1; MG/1
TABLET ORAL
Qty: 20 TABLET | Refills: 0 | Status: SHIPPED | OUTPATIENT
Start: 2022-05-06 | End: 2022-05-24

## 2022-05-06 NOTE — TELEPHONE ENCOUNTER
Contacted by microbiology lab for positive blood cultures. 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 Has 2/3 blood culture sets positive for GPCs drawn off of central line. Cultures drawn due to several day history of fevers. Contacted wife Rose who says he is still having fevers but stable. She will bring him to ED for evaluation and like admission. ED contacted and updated. They live approximately 2 hours away and will need to arrange transportation, should arrive today.    Romel Willis MD

## 2022-05-07 ENCOUNTER — HEALTH MAINTENANCE LETTER (OUTPATIENT)
Age: 52
End: 2022-05-07

## 2022-05-07 ENCOUNTER — APPOINTMENT (OUTPATIENT)
Dept: ULTRASOUND IMAGING | Facility: CLINIC | Age: 52
DRG: 315 | End: 2022-05-07
Attending: FAMILY MEDICINE
Payer: COMMERCIAL

## 2022-05-07 ENCOUNTER — HOSPITAL ENCOUNTER (INPATIENT)
Facility: CLINIC | Age: 52
LOS: 5 days | Discharge: HOME HEALTH | DRG: 315 | End: 2022-05-12
Attending: FAMILY MEDICINE | Admitting: INTERNAL MEDICINE
Payer: COMMERCIAL

## 2022-05-07 ENCOUNTER — APPOINTMENT (OUTPATIENT)
Dept: GENERAL RADIOLOGY | Facility: CLINIC | Age: 52
DRG: 315 | End: 2022-05-07
Attending: FAMILY MEDICINE
Payer: COMMERCIAL

## 2022-05-07 DIAGNOSIS — Z20.822 LAB TEST NEGATIVE FOR COVID-19 VIRUS: ICD-10-CM

## 2022-05-07 DIAGNOSIS — R50.9 FEVER, UNKNOWN ORIGIN: Primary | ICD-10-CM

## 2022-05-07 DIAGNOSIS — I82.B11 THROMBOSIS OF RIGHT SUBCLAVIAN VEIN (H): ICD-10-CM

## 2022-05-07 DIAGNOSIS — K90.829 SHORT BOWEL SYNDROME: ICD-10-CM

## 2022-05-07 DIAGNOSIS — R78.81 BACTEREMIA: ICD-10-CM

## 2022-05-07 DIAGNOSIS — R78.81 BACTEREMIA ASSOCIATED WITH INTRAVASCULAR LINE, INITIAL ENCOUNTER (H): ICD-10-CM

## 2022-05-07 DIAGNOSIS — R11.2 NAUSEA AND VOMITING, INTRACTABILITY OF VOMITING NOT SPECIFIED, UNSPECIFIED VOMITING TYPE: ICD-10-CM

## 2022-05-07 DIAGNOSIS — T80.211A BLOODSTREAM INFECTION ASSOCIATED WITH CENTRAL VENOUS CATHETER, INITIAL ENCOUNTER: ICD-10-CM

## 2022-05-07 DIAGNOSIS — R78.81 GRAM-POSITIVE BACTEREMIA: ICD-10-CM

## 2022-05-07 DIAGNOSIS — M79.89 SWELLING OF RIGHT UPPER EXTREMITY: ICD-10-CM

## 2022-05-07 DIAGNOSIS — I82.A11 ACUTE DEEP VEIN THROMBOSIS (DVT) OF AXILLARY VEIN OF RIGHT UPPER EXTREMITY (H): ICD-10-CM

## 2022-05-07 DIAGNOSIS — T82.7XXA BACTEREMIA ASSOCIATED WITH INTRAVASCULAR LINE, INITIAL ENCOUNTER (H): ICD-10-CM

## 2022-05-07 DIAGNOSIS — E86.0 DEHYDRATION: ICD-10-CM

## 2022-05-07 LAB
ALBUMIN SERPL-MCNC: 3.5 G/DL (ref 3.4–5)
ALBUMIN UR-MCNC: NEGATIVE MG/DL
ALP SERPL-CCNC: 61 U/L (ref 40–150)
ALT SERPL W P-5'-P-CCNC: 23 U/L (ref 0–70)
ANION GAP SERPL CALCULATED.3IONS-SCNC: 10 MMOL/L (ref 3–14)
APPEARANCE UR: CLEAR
APTT PPP: 35 SECONDS (ref 22–38)
AST SERPL W P-5'-P-CCNC: 17 U/L (ref 0–45)
BASOPHILS # BLD AUTO: 0 10E3/UL (ref 0–0.2)
BASOPHILS NFR BLD AUTO: 0 %
BILIRUB SERPL-MCNC: 0.7 MG/DL (ref 0.2–1.3)
BILIRUB UR QL STRIP: NEGATIVE
BUN SERPL-MCNC: 12 MG/DL (ref 7–30)
CALCIUM SERPL-MCNC: 8.6 MG/DL (ref 8.5–10.1)
CHLORIDE BLD-SCNC: 106 MMOL/L (ref 94–109)
CO2 SERPL-SCNC: 27 MMOL/L (ref 20–32)
COLOR UR AUTO: YELLOW
CREAT SERPL-MCNC: 0.61 MG/DL (ref 0.66–1.25)
CREAT SERPL-MCNC: 0.61 MG/DL (ref 0.66–1.25)
CRP SERPL-MCNC: 34 MG/L (ref 0–8)
EOSINOPHIL # BLD AUTO: 0.1 10E3/UL (ref 0–0.7)
EOSINOPHIL NFR BLD AUTO: 1 %
ERYTHROCYTE [DISTWIDTH] IN BLOOD BY AUTOMATED COUNT: 14.4 % (ref 10–15)
GFR SERPL CREATININE-BSD FRML MDRD: >90 ML/MIN/1.73M2
GFR SERPL CREATININE-BSD FRML MDRD: >90 ML/MIN/1.73M2
GLUCOSE BLD-MCNC: 84 MG/DL (ref 70–99)
GLUCOSE UR STRIP-MCNC: NEGATIVE MG/DL
HCT VFR BLD AUTO: 35.4 % (ref 40–53)
HGB BLD-MCNC: 11.1 G/DL (ref 13.3–17.7)
HGB UR QL STRIP: ABNORMAL
IMM GRANULOCYTES # BLD: 0 10E3/UL
IMM GRANULOCYTES NFR BLD: 0 %
INR PPP: 1.11 (ref 0.85–1.15)
KETONES UR STRIP-MCNC: 60 MG/DL
LACTATE SERPL-SCNC: 0.7 MMOL/L (ref 0.7–2)
LEUKOCYTE ESTERASE UR QL STRIP: NEGATIVE
LIPASE SERPL-CCNC: 38 U/L (ref 73–393)
LYMPHOCYTES # BLD AUTO: 1.2 10E3/UL (ref 0.8–5.3)
LYMPHOCYTES NFR BLD AUTO: 16 %
MAGNESIUM SERPL-MCNC: 2 MG/DL (ref 1.6–2.3)
MCH RBC QN AUTO: 30.6 PG (ref 26.5–33)
MCHC RBC AUTO-ENTMCNC: 31.4 G/DL (ref 31.5–36.5)
MCV RBC AUTO: 98 FL (ref 78–100)
MONOCYTES # BLD AUTO: 1 10E3/UL (ref 0–1.3)
MONOCYTES NFR BLD AUTO: 13 %
MUCOUS THREADS #/AREA URNS LPF: PRESENT /LPF
NEUTROPHILS # BLD AUTO: 5.3 10E3/UL (ref 1.6–8.3)
NEUTROPHILS NFR BLD AUTO: 70 %
NITRATE UR QL: NEGATIVE
NRBC # BLD AUTO: 0 10E3/UL
NRBC BLD AUTO-RTO: 0 /100
NT-PROBNP SERPL-MCNC: 157 PG/ML (ref 0–450)
PH UR STRIP: 6.5 [PH] (ref 5–7)
PHOSPHATE SERPL-MCNC: 3 MG/DL (ref 2.5–4.5)
PLATELET # BLD AUTO: 138 10E3/UL (ref 150–450)
POTASSIUM BLD-SCNC: 3.6 MMOL/L (ref 3.4–5.3)
PROCALCITONIN SERPL-MCNC: 0.2 NG/ML
PROT SERPL-MCNC: 6.8 G/DL (ref 6.8–8.8)
RBC # BLD AUTO: 3.63 10E6/UL (ref 4.4–5.9)
RBC URINE: 5 /HPF
SARS-COV-2 RNA RESP QL NAA+PROBE: NEGATIVE
SODIUM SERPL-SCNC: 143 MMOL/L (ref 133–144)
SP GR UR STRIP: 1.02 (ref 1–1.03)
SQUAMOUS EPITHELIAL: <1 /HPF
TROPONIN I SERPL HS-MCNC: 4 NG/L
UROBILINOGEN UR STRIP-MCNC: 3 MG/DL
WBC # BLD AUTO: 7.6 10E3/UL (ref 4–11)
WBC URINE: 0 /HPF

## 2022-05-07 PROCEDURE — 83605 ASSAY OF LACTIC ACID: CPT | Performed by: FAMILY MEDICINE

## 2022-05-07 PROCEDURE — 87077 CULTURE AEROBIC IDENTIFY: CPT | Performed by: FAMILY MEDICINE

## 2022-05-07 PROCEDURE — 250N000011 HC RX IP 250 OP 636: Performed by: FAMILY MEDICINE

## 2022-05-07 PROCEDURE — 258N000003 HC RX IP 258 OP 636: Performed by: FAMILY MEDICINE

## 2022-05-07 PROCEDURE — 83880 ASSAY OF NATRIURETIC PEPTIDE: CPT | Performed by: FAMILY MEDICINE

## 2022-05-07 PROCEDURE — 99285 EMERGENCY DEPT VISIT HI MDM: CPT | Performed by: FAMILY MEDICINE

## 2022-05-07 PROCEDURE — U0003 INFECTIOUS AGENT DETECTION BY NUCLEIC ACID (DNA OR RNA); SEVERE ACUTE RESPIRATORY SYNDROME CORONAVIRUS 2 (SARS-COV-2) (CORONAVIRUS DISEASE [COVID-19]), AMPLIFIED PROBE TECHNIQUE, MAKING USE OF HIGH THROUGHPUT TECHNOLOGIES AS DESCRIBED BY CMS-2020-01-R: HCPCS | Performed by: FAMILY MEDICINE

## 2022-05-07 PROCEDURE — 250N000011 HC RX IP 250 OP 636

## 2022-05-07 PROCEDURE — 96376 TX/PRO/DX INJ SAME DRUG ADON: CPT | Performed by: FAMILY MEDICINE

## 2022-05-07 PROCEDURE — 120N000002 HC R&B MED SURG/OB UMMC

## 2022-05-07 PROCEDURE — 250N000013 HC RX MED GY IP 250 OP 250 PS 637: Performed by: INTERNAL MEDICINE

## 2022-05-07 PROCEDURE — 83690 ASSAY OF LIPASE: CPT | Performed by: FAMILY MEDICINE

## 2022-05-07 PROCEDURE — 85025 COMPLETE CBC W/AUTO DIFF WBC: CPT | Performed by: FAMILY MEDICINE

## 2022-05-07 PROCEDURE — 80053 COMPREHEN METABOLIC PANEL: CPT | Performed by: FAMILY MEDICINE

## 2022-05-07 PROCEDURE — C9803 HOPD COVID-19 SPEC COLLECT: HCPCS | Performed by: FAMILY MEDICINE

## 2022-05-07 PROCEDURE — 84100 ASSAY OF PHOSPHORUS: CPT

## 2022-05-07 PROCEDURE — 84145 PROCALCITONIN (PCT): CPT | Performed by: FAMILY MEDICINE

## 2022-05-07 PROCEDURE — 96375 TX/PRO/DX INJ NEW DRUG ADDON: CPT | Performed by: FAMILY MEDICINE

## 2022-05-07 PROCEDURE — 96365 THER/PROPH/DIAG IV INF INIT: CPT | Performed by: FAMILY MEDICINE

## 2022-05-07 PROCEDURE — 86140 C-REACTIVE PROTEIN: CPT | Performed by: FAMILY MEDICINE

## 2022-05-07 PROCEDURE — 81001 URINALYSIS AUTO W/SCOPE: CPT | Performed by: FAMILY MEDICINE

## 2022-05-07 PROCEDURE — 96366 THER/PROPH/DIAG IV INF ADDON: CPT | Performed by: FAMILY MEDICINE

## 2022-05-07 PROCEDURE — 93971 EXTREMITY STUDY: CPT | Mod: 26 | Performed by: STUDENT IN AN ORGANIZED HEALTH CARE EDUCATION/TRAINING PROGRAM

## 2022-05-07 PROCEDURE — 36415 COLL VENOUS BLD VENIPUNCTURE: CPT | Performed by: FAMILY MEDICINE

## 2022-05-07 PROCEDURE — 93971 EXTREMITY STUDY: CPT | Mod: RT

## 2022-05-07 PROCEDURE — 83735 ASSAY OF MAGNESIUM: CPT

## 2022-05-07 PROCEDURE — 85730 THROMBOPLASTIN TIME PARTIAL: CPT | Performed by: FAMILY MEDICINE

## 2022-05-07 PROCEDURE — 99285 EMERGENCY DEPT VISIT HI MDM: CPT | Mod: 25 | Performed by: FAMILY MEDICINE

## 2022-05-07 PROCEDURE — 250N000013 HC RX MED GY IP 250 OP 250 PS 637

## 2022-05-07 PROCEDURE — 84484 ASSAY OF TROPONIN QUANT: CPT | Performed by: FAMILY MEDICINE

## 2022-05-07 PROCEDURE — 71046 X-RAY EXAM CHEST 2 VIEWS: CPT

## 2022-05-07 PROCEDURE — 71046 X-RAY EXAM CHEST 2 VIEWS: CPT | Mod: 26 | Performed by: STUDENT IN AN ORGANIZED HEALTH CARE EDUCATION/TRAINING PROGRAM

## 2022-05-07 PROCEDURE — 99223 1ST HOSP IP/OBS HIGH 75: CPT | Mod: AI | Performed by: INTERNAL MEDICINE

## 2022-05-07 PROCEDURE — 85610 PROTHROMBIN TIME: CPT | Performed by: FAMILY MEDICINE

## 2022-05-07 RX ORDER — ACETAMINOPHEN 325 MG/10.15ML
650 LIQUID ORAL EVERY 6 HOURS PRN
Status: DISCONTINUED | OUTPATIENT
Start: 2022-05-07 | End: 2022-05-12 | Stop reason: HOSPADM

## 2022-05-07 RX ORDER — PANTOPRAZOLE SODIUM 40 MG/1
40 TABLET, DELAYED RELEASE ORAL DAILY
Status: DISCONTINUED | OUTPATIENT
Start: 2022-05-07 | End: 2022-05-12 | Stop reason: HOSPADM

## 2022-05-07 RX ORDER — ACETAMINOPHEN 650 MG/1
650 SUPPOSITORY RECTAL EVERY 6 HOURS PRN
Status: DISCONTINUED | OUTPATIENT
Start: 2022-05-07 | End: 2022-05-12 | Stop reason: HOSPADM

## 2022-05-07 RX ORDER — ENOXAPARIN SODIUM 150 MG/ML
120 INJECTION SUBCUTANEOUS DAILY
Status: DISCONTINUED | OUTPATIENT
Start: 2022-05-07 | End: 2022-05-12 | Stop reason: HOSPADM

## 2022-05-07 RX ORDER — LIDOCAINE 40 MG/G
CREAM TOPICAL
Status: DISCONTINUED | OUTPATIENT
Start: 2022-05-07 | End: 2022-05-12 | Stop reason: HOSPADM

## 2022-05-07 RX ORDER — CEFAZOLIN SODIUM 2 G/100ML
2 INJECTION, SOLUTION INTRAVENOUS EVERY 8 HOURS
Status: DISCONTINUED | OUTPATIENT
Start: 2022-05-07 | End: 2022-05-11

## 2022-05-07 RX ORDER — FENTANYL 25 UG/1
25 PATCH TRANSDERMAL
Status: DISCONTINUED | OUTPATIENT
Start: 2022-05-08 | End: 2022-05-07

## 2022-05-07 RX ORDER — CYANOCOBALAMIN 1000 UG/ML
1000 INJECTION, SOLUTION INTRAMUSCULAR; SUBCUTANEOUS
Status: DISCONTINUED | OUTPATIENT
Start: 2022-06-02 | End: 2022-05-12 | Stop reason: HOSPADM

## 2022-05-07 RX ORDER — OXYCODONE HCL 5 MG/5 ML
5-10 SOLUTION, ORAL ORAL 3 TIMES DAILY PRN
Status: DISCONTINUED | OUTPATIENT
Start: 2022-05-07 | End: 2022-05-07

## 2022-05-07 RX ORDER — SUCRALFATE ORAL 1 G/10ML
1 SUSPENSION ORAL 2 TIMES DAILY PRN
Status: DISCONTINUED | OUTPATIENT
Start: 2022-05-07 | End: 2022-05-12 | Stop reason: HOSPADM

## 2022-05-07 RX ORDER — CEFAZOLIN SODIUM 2 G/100ML
2 INJECTION, SOLUTION INTRAVENOUS ONCE
Status: COMPLETED | OUTPATIENT
Start: 2022-05-07 | End: 2022-05-07

## 2022-05-07 RX ORDER — ONDANSETRON 2 MG/ML
4 INJECTION INTRAMUSCULAR; INTRAVENOUS ONCE
Status: COMPLETED | OUTPATIENT
Start: 2022-05-07 | End: 2022-05-07

## 2022-05-07 RX ORDER — ENOXAPARIN SODIUM 100 MG/ML
40 INJECTION SUBCUTANEOUS EVERY 24 HOURS
Status: CANCELLED | OUTPATIENT
Start: 2022-05-07

## 2022-05-07 RX ORDER — ALBUTEROL SULFATE 90 UG/1
2 AEROSOL, METERED RESPIRATORY (INHALATION) EVERY 6 HOURS PRN
Status: DISCONTINUED | OUTPATIENT
Start: 2022-05-07 | End: 2022-05-12 | Stop reason: HOSPADM

## 2022-05-07 RX ORDER — CARVEDILOL 12.5 MG/1
12.5 TABLET ORAL 2 TIMES DAILY WITH MEALS
Status: DISCONTINUED | OUTPATIENT
Start: 2022-05-07 | End: 2022-05-12 | Stop reason: HOSPADM

## 2022-05-07 RX ORDER — DEXTROSE MONOHYDRATE 100 MG/ML
INJECTION, SOLUTION INTRAVENOUS CONTINUOUS PRN
Status: DISCONTINUED | OUTPATIENT
Start: 2022-05-07 | End: 2022-05-12 | Stop reason: HOSPADM

## 2022-05-07 RX ORDER — LORAZEPAM 1 MG/1
1 TABLET ORAL DAILY PRN
Status: DISCONTINUED | OUTPATIENT
Start: 2022-05-07 | End: 2022-05-12 | Stop reason: HOSPADM

## 2022-05-07 RX ORDER — ACETAMINOPHEN 650 MG/1
650 SUPPOSITORY RECTAL EVERY 6 HOURS PRN
Status: DISCONTINUED | OUTPATIENT
Start: 2022-05-07 | End: 2022-05-07 | Stop reason: DRUGHIGH

## 2022-05-07 RX ORDER — HYDROMORPHONE HYDROCHLORIDE 1 MG/ML
0.5 INJECTION, SOLUTION INTRAMUSCULAR; INTRAVENOUS; SUBCUTANEOUS ONCE
Status: COMPLETED | OUTPATIENT
Start: 2022-05-07 | End: 2022-05-07

## 2022-05-07 RX ORDER — ENOXAPARIN SODIUM 150 MG/ML
120 INJECTION SUBCUTANEOUS DAILY
Status: DISCONTINUED | OUTPATIENT
Start: 2022-05-08 | End: 2022-05-07

## 2022-05-07 RX ORDER — ERGOCALCIFEROL 1.25 MG/1
50000 CAPSULE, LIQUID FILLED ORAL WEEKLY
Status: DISCONTINUED | OUTPATIENT
Start: 2022-05-08 | End: 2022-05-12 | Stop reason: HOSPADM

## 2022-05-07 RX ORDER — ONDANSETRON 4 MG/1
4 TABLET, ORALLY DISINTEGRATING ORAL EVERY 6 HOURS PRN
Status: DISCONTINUED | OUTPATIENT
Start: 2022-05-07 | End: 2022-05-12 | Stop reason: HOSPADM

## 2022-05-07 RX ORDER — OXYCODONE HCL 5 MG/5 ML
5-10 SOLUTION, ORAL ORAL EVERY 6 HOURS PRN
Status: DISCONTINUED | OUTPATIENT
Start: 2022-05-07 | End: 2022-05-12 | Stop reason: HOSPADM

## 2022-05-07 RX ORDER — ACETAMINOPHEN 325 MG/1
650 TABLET ORAL EVERY 6 HOURS PRN
Status: DISCONTINUED | OUTPATIENT
Start: 2022-05-07 | End: 2022-05-07 | Stop reason: DRUGHIGH

## 2022-05-07 RX ADMIN — ONDANSETRON 4 MG: 4 TABLET, ORALLY DISINTEGRATING ORAL at 20:55

## 2022-05-07 RX ADMIN — CEFAZOLIN SODIUM 2 G: 2 INJECTION, SOLUTION INTRAVENOUS at 15:20

## 2022-05-07 RX ADMIN — PANTOPRAZOLE SODIUM 40 MG: 40 TABLET, DELAYED RELEASE ORAL at 19:51

## 2022-05-07 RX ADMIN — CEFAZOLIN SODIUM 2 G: 2 INJECTION, SOLUTION INTRAVENOUS at 22:58

## 2022-05-07 RX ADMIN — ENOXAPARIN SODIUM 120 MG: 120 INJECTION SUBCUTANEOUS at 21:25

## 2022-05-07 RX ADMIN — ONDANSETRON 4 MG: 2 INJECTION INTRAMUSCULAR; INTRAVENOUS at 14:49

## 2022-05-07 RX ADMIN — OXYCODONE HYDROCHLORIDE 10 MG: 5 SOLUTION ORAL at 19:50

## 2022-05-07 RX ADMIN — HYDROMORPHONE HYDROCHLORIDE 0.5 MG: 1 INJECTION, SOLUTION INTRAMUSCULAR; INTRAVENOUS; SUBCUTANEOUS at 16:42

## 2022-05-07 RX ADMIN — CARVEDILOL 12.5 MG: 12.5 TABLET, FILM COATED ORAL at 20:43

## 2022-05-07 RX ADMIN — SODIUM CHLORIDE 1000 ML: 9 INJECTION, SOLUTION INTRAVENOUS at 14:53

## 2022-05-07 RX ADMIN — LORAZEPAM 1 MG: 1 TABLET ORAL at 21:32

## 2022-05-07 RX ADMIN — HYDROMORPHONE HYDROCHLORIDE 0.5 MG: 1 INJECTION, SOLUTION INTRAMUSCULAR; INTRAVENOUS; SUBCUTANEOUS at 14:48

## 2022-05-07 RX ADMIN — ACETAMINOPHEN 650 MG: 325 SOLUTION ORAL at 23:32

## 2022-05-07 ASSESSMENT — ENCOUNTER SYMPTOMS
AGITATION: 0
ARTHRALGIAS: 0
WHEEZING: 0
SHORTNESS OF BREATH: 0
WEAKNESS: 1
DECREASED CONCENTRATION: 0
COLOR CHANGE: 0
DYSURIA: 0
EYE REDNESS: 0
FATIGUE: 1
ABDOMINAL PAIN: 1
APPETITE CHANGE: 1
CONFUSION: 0
BACK PAIN: 0
MYALGIAS: 1
HEADACHES: 0
DIFFICULTY URINATING: 0
VOMITING: 1
NECK STIFFNESS: 0
SORE THROAT: 0
TROUBLE SWALLOWING: 0
WOUND: 1
LIGHT-HEADEDNESS: 0
NAUSEA: 1
NERVOUS/ANXIOUS: 0
CHILLS: 1
UNEXPECTED WEIGHT CHANGE: 0
ACTIVITY CHANGE: 0
FLANK PAIN: 0
VOICE CHANGE: 0
BRUISES/BLEEDS EASILY: 1
COUGH: 1
DYSPHORIC MOOD: 0
FEVER: 1

## 2022-05-07 ASSESSMENT — ACTIVITIES OF DAILY LIVING (ADL)
ADLS_ACUITY_SCORE: 14

## 2022-05-07 NOTE — H&P
[facility]    History and Physical - Medicine Service, MAROON TEAM        Date of Admission:  5/7/2022    Assessment & Plan      Parker Acevedo is a 51 year old male admitted on 5/7/2022. He has a history of bariatric surgery (RYGB, esophagojejunostomy complicated by short gut syndrome) on chronic TPN, recurrent CLABSI, short-gut syndrome and hx of repeated line-releated bloody stream infections and is admitted for concern for staph epi bacteremia.     #staph epi bacteremia  #fevers  Patient was having multiple days of fever, instructed by ID to have blood cultures on 5/5 which resulted with staph epi in 2/3 blood cultures (drawn off of central line). Was contacted by ID that he should present to the emergency department for further evaluation and treatment. Here he was vitally stable, afebrile, only significant complaint is headache and tiredness. No focal infective source, Cm without drainage/purulence/bleeding/erythema though suspect for source as he has had positive BC prior while Cm was in place (S epi then). Exam otherwise unrevealing. At this time will treat with antibiotics but with patient being relatively stable and no overt signs that the line is the infectious source will defer immediate removal of line.   - cefazolin 2g Q8 (treated with cefazolin per ID last admission in January for staph bacteremia)  - if fevers can consider switching to vanc  - ID consult  - will defer on decision for line removal  - await repeat blood cultures    #R subclavian DVT  Patient admitted back 2/23 with R subclavian DVT (extended from proximal axillary vein to R internal jugular and brachiocephalic vein). Treated with heparin drip initially but heme/onc consulted and noted change from heparin infusion to subcu enoxaparin with plan to continue enoxaparin for 3-6 months, repeat US at 3 months. Still w/ DVT this presentation. Improved on imaging though still having  "swelling in R arm, pt reporting it is improved from when he first was diagnosed. Was initially discharged on 80mg BID but currently on enoxaparin 120 daily (unclear when this was changed, can't find in chart review). At this time will continue enoxaparin home dose and get hematology involved to see if any change is indicated as it seems as though there is significant clot burden   - enoxaparin 120mg daily injection   - heme consult    #s/p RYGB c/p short gut syndrome  Patient with history of bariatric surgery now with short gut syndrome. Reports regular diarrhea, nausea, abd pain, difficulty with eating occasionally and supplements nutrition with TPN per Toi. Reports that the symptoms he usually experiences from these are not too different, not concerned for new GI process or infectious process. Feeding tube in place as well.   - nutrition consult to assist with TPN  - regular diet  - will check phos and mag  - continue home pantoprazole 40mg daily  - zofran PRN  - continue home carafate PRN  - PTA B12, vit D, other vitamins    #anemia  Hgb 11.1 on admission, appears at baseline  - monitor  - transfuse if <7     Diet:   Full, TPN consult placed  DVT Prophylaxis: enoxaparin 120mg daily  Sanchez Catheter: Not present  Fluids: oral, s/p 1L in ED  Central Lines: PRESENT       Cardiac Monitoring: None  Code Status:   FULL    Clinically Significant Risk Factors Present on Admission               # Coagulation Defect: home medication list includes an anticoagulant medication    # Overweight: Estimated body mass index is 25.08 kg/m  as calculated from the following:    Height as of this encounter: 1.803 m (5' 11\").    Weight as of this encounter: 81.6 kg (179 lb 12.8 oz).      Disposition Plan   Expected Discharge:  2-3 days   Anticipated discharge location:  Awaiting care coordination huddle  Delays:   line decision        The patient's care was discussed with the Attending Physician, Dr. Cortes.    Eduardo Gonzalez, " MD  Medicine Service, [facility]  Securely message with the [employer] Web Console (learn more here)  Text page via [facility] Paging/Directory   Please see signed in provider for up to date coverage information    ______________________________________________________________________    Chief Complaint   Fevers, headache, fatigue    History is obtained from the patient    History of Present Illness   Parker Acevedo is a 51 year old male admitted on 5/7/2022. He has a history of bariatric surgery (RYGB, esophagojejunostomy complicated by short gut syndrome) on chronic TPN, recurrent CLABSI, short-gut syndrome and hx of repeated line-releated bloody stream infections and is admitted for concern for staph epi bacteremia.    Patient reports he has had fevers at home for the past 2-3 days. Reports temps up to 103 recorded at home. Discussed with his physician and was instructed to get blood cultures drawn on 5/5 which subsequently turned positive today for Staph epi and thus instructed to present to the emergency department for further evaluation. He notes that additionally he has felt fairly tired, fatigued, and has headache (generalized). He notes that he typically has baseline nausea/vomiting and this isn't worse than usual, no changes to his chronic abdominal pain, no significant chest pain. Has had a cough but unsure if coughing up phlegm. Diarrhea baseline. Patient also reports that he has had swelling in his R arm since being diagnosed with a DVT in R subclavian artery months ago, believes the swelling has improved but still present, no loss of strength or sensation.     In the ED patient was given ancef and labs drawn which were significant for  Elevated CRP, anemia. Repeat blood cultures drawn.     Review of Systems    The 10 point Review of Systems is negative other than noted in the HPI or here.     Past Medical History    I have reviewed this patient's  medical history and updated it with pertinent information if needed.   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     Complication of anesthesia      Difficulty swallowing      Gastric ulcer, unspecified as acute or chronic, without mention of hemorrhage, perforation, or obstruction      Gastro-oesophageal reflux disease      Head injury      Hiatal hernia      Other bladder disorder      Other chronic pain      PONV (postoperative nausea and vomiting)      Severe malnutrition (H)     TPN     Short gut syndrome      Tobacco abuse         Past Surgical History   I have reviewed this patient's surgical history and updated it with pertinent information if needed.  Past Surgical History:   Procedure Laterality Date     AMPUTATION       APPENDECTOMY       BACK SURGERY  11/3/2014    curve in the spine     BIOPSY LYMPH NODE CERVICAL N/A 2/20/2015    Procedure: BIOPSY LYMPH NODE CERVICAL;  Surgeon: Baron Scanlon MD;  [facility]     CHOLECYSTECTOMY       COLONOSCOPY N/A 7/14/2021    Procedure: COLONOSCOPY;  Surgeon: Jimbo Estrada MD;  Location: [facility] OR     COLONOSCOPY N/A 4/13/2022    Procedure: COLONOSCOPY;  Surgeon: Jimbo Estrada MD;  Location: [facility] OR     DISCECTOMY, FUSION CERVICAL ANTERIOR ONE LEVEL, COMBINED N/A 2/15/2017    Procedure: COMBINED DISCECTOMY, FUSION CERVICAL ANTERIOR ONE LEVEL;  Surgeon: Darren Campos MD;  [facility]     ENDOSCOPIC INSERTION TUBE GASTROSTOMY  9/9/2013    Procedure: ENDOSCOPIC INSERTION TUBE GASTROSTOMY;;  Surgeon: Francis Vyas MD;  [facility]     ENDOSCOPIC ULTRASOUND UPPER GASTROINTESTINAL TRACT (GI)  4/29/2011    Procedure:ENDOSCOPIC ULTRASOUND UPPER  GASTROINTESTINAL TRACT (GI); Both Procedures done Conjointly; Surgeon:NEREIDA HOUSER; [facility]     ENDOSCOPIC ULTRASOUND UPPER GASTROINTESTINAL TRACT (GI)  9/9/2013    Procedure: ENDOSCOPIC ULTRASOUND UPPER GASTROINTESTINAL TRACT (GI);  Endoscopic Ultrasound Guide Gastrostomy Tube Placement  C-arm;  Surgeon: Noe Lizarraga MD;  [facility]     ENDOSCOPIC ULTRASOUND UPPER GASTROINTESTINAL TRACT (GI) N/A 2/24/2021    Procedure: ENDOSCOPIC ULTRASOUND, ESOPHAGOSCOPY / UPPER GASTROINTESTINAL TRACT (GI), esophagastrogastroduodenoscopy;  Surgeon: Berny Bach MD;  [facility]     ENDOSCOPY  03/25/11    EGD, [facility]     ENDOSCOPY  08/04/09    Upper Endoscopy, [facility]     ENDOSCOPY  01/05/09    Upper Endoscopy, [facility]     ESOPHAGOSCOPY, GASTROSCOPY, DUODENOSCOPY (EGD), COMBINED  4/20/2011    Procedure:COMBINED ESOPHAGOSCOPY, GASTROSCOPY, DUODENOSCOPY (EGD); Surgeon:BLU VYAS; [facility]     ESOPHAGOSCOPY, GASTROSCOPY, DUODENOSCOPY (EGD), COMBINED  6/15/2011    Procedure:COMBINED ESOPHAGOSCOPY, GASTROSCOPY, DUODENOSCOPY (EGD); Surgeon:BLU VYAS; [facility]     ESOPHAGOSCOPY, GASTROSCOPY, DUODENOSCOPY (EGD), COMBINED  6/12/2013    Procedure: COMBINED ESOPHAGOSCOPY, GASTROSCOPY, DUODENOSCOPY (EGD);;  Surgeon: Blu Vyas MD;  [facility]     ESOPHAGOSCOPY, GASTROSCOPY, DUODENOSCOPY (EGD), COMBINED  11/22/2013    Procedure: COMBINED ESOPHAGOSCOPY, GASTROSCOPY, DUODENOSCOPY (EGD);;  Surgeon: Blu Vyas MD;  [facility]     ESOPHAGOSCOPY, GASTROSCOPY, DUODENOSCOPY (EGD), COMBINED  4/30/2014    Procedure: COMBINED ESOPHAGOSCOPY, GASTROSCOPY, DUODENOSCOPY (EGD);  Surgeon: Blu Vyas MD;  [facility]     ESOPHAGOSCOPY, GASTROSCOPY, DUODENOSCOPY (EGD), COMBINED N/A 2/20/2015    Procedure: COMBINED ESOPHAGOSCOPY, GASTROSCOPY, DUODENOSCOPY (EGD), BIOPSY SINGLE OR MULTIPLE;  Surgeon: Capo  MD Baron;  Location:  OR     ESOPHAGOSCOPY, GASTROSCOPY, DUODENOSCOPY (EGD), COMBINED N/A 9/30/2015    Procedure: COMBINED ESOPHAGOSCOPY, GASTROSCOPY, DUODENOSCOPY (EGD);  Surgeon: Francis Vyas MD;  Location:  GI     ESOPHAGOSCOPY, GASTROSCOPY, DUODENOSCOPY (EGD), COMBINED N/A 10/3/2019    Procedure: Upper Endoscopy;  Surgeon: Clif Morrow MD;  [facility]     GASTRECTOMY  6/22/2012    Procedure: GASTRECTOMY;  Open Approach, Excise Ulcers,Partial Gastrectomy, Esophagojejunostomy, Hiatal Hernia Repair, Extensive Lysis of Adhesions and Esaphagogastrodudenoscopy.;  Surgeon: Francis Vyas MD;  [facility]     GASTROJEJUNOSTOMY  08/26/09    Extensice enterolysis, partial resect. jejunum, part. resect gastric pouch, gastrojejunostomy anastomosis     HC ESOPH/GAS REFLUX TEST W NASAL IMPED ELECTRODE  8/5/2013    Procedure: ESOPHAGEAL IMPEDENCE FUNCTION TEST 1 HOUR OR LESS;  Surgeon: Halie Lang MD;  Location:  GI     HEAD & NECK SURGERY  2/15/2017    C5-C6     HERNIA REPAIR  2006    Umbilical hernia     HERNIORRHAPHY HIATAL  6/22/2012    Procedure: HERNIORRHAPHY HIATAL;;  Surgeon: Francis Vyas MD;  Location:  OR     HERNIORRHAPHY INGUINAL  11/22/2013    Procedure: HERNIORRHAPHY INGUINAL;;  Surgeon: Francis Vyas MD;  [facility]     INSERT PICC LINE Right 12/19/2019    Procedure: Picc Placement;  Surgeon: Per Dumont PA-C;  [facility]     INSERT PICC LINE Right 2/21/2020    Procedure: INSERTION, PICC;  Surgeon: Per Dumont PA-C;  [facility]     INSERT PORT VASCULAR ACCESS Right 12/19/2017    Procedure: INSERT PORT VASCULAR ACCESS;  Right Chest Port Placement ;  Surgeon: Lisandro Alejandro PA-C;  [facility]     INSERT PORT VASCULAR ACCESS Right 8/2/2018    Procedure: INSERT PORT VASCULAR ACCESS;  Place single lumen tunneled central venous access catheter;  Surgeon: Guy Jamil PA-C;  [facility]      IR CVC TUNNEL PLACEMENT > 5 YRS OF AGE  8/7/2019     IR CVC TUNNEL PLACEMENT > 5 YRS OF AGE  4/14/2020     IR CVC TUNNEL PLACEMENT > 5 YRS OF AGE  8/3/2020     IR CVC TUNNEL PLACEMENT > 5 YRS OF AGE  9/4/2020     IR CVC TUNNEL PLACEMENT > 5 YRS OF AGE  2/5/2021     IR CVC TUNNEL PLACEMENT > 5 YRS OF AGE  3/23/2021     IR CVC TUNNEL PLACEMENT > 5 YRS OF AGE  1/13/2022     IR CVC TUNNEL REMOVAL RIGHT  10/1/2019     IR CVC TUNNEL REMOVAL RIGHT  7/30/2020     IR CVC TUNNEL REMOVAL RIGHT  9/2/2020     IR CVC TUNNEL REMOVAL RIGHT  2/3/2021     IR CVC TUNNEL REMOVAL RIGHT  3/19/2021     IR CVC TUNNEL REMOVAL RIGHT  1/10/2022     IR CVC TUNNEL REVISION RIGHT  5/7/2021     IR FOLLOW UP VISIT OUTPATIENT  8/7/2019     IR PICC PLACEMENT > 5 YRS OF AGE  3/7/2019     IR PICC PLACEMENT > 5 YRS OF AGE  12/19/2019     IR PICC PLACEMENT > 5 YRS OF AGE  2/21/2020     LAPAROTOMY EXPLORATORY  11/22/2013    Procedure: LAPAROTOMY EXPLORATORY;  Exploratory Laparotomy, Upper Endoscopy, Left Inguinal Hernia Repair;  Surgeon: Francis Vyas MD;  [facility]     ORTHOPEDIC SURGERY       PICC INSERTION Right 03/16/2017    5fr DL BioFlo PICC, 42cm (3cm external) in the R medial brachial vein w/ tip in the SVC RA junction.     PICC INSERTION Left 09/23/2017    5fr DL BioFlo PICC, 45cm (1cm external) in the L basilic vein w/ tip in the SVC RA junction.     PICC INSERTION Right 05/16/2019    5Fr - 43cm, Medial brachial vein, low SVC     PICC INSERTION Right 10/02/2019    5Fr - 43cm (2cm external), basilic vein, low SVC     SHAYLEE EN Y BOWEL  2003     SOFT TISSUE SURGERY       THORACIC SURGERY       TONSILLECTOMY       TRANSESOPHAGEAL ECHOCARDIOGRAM INTRAOPERATIVE N/A 1/8/2019    Procedure: TRANSESOPHAGEAL ECHOCARDIOGRAM INTRAOPERATIVE;  Surgeon: GENERIC ANESTHESIA PROVIDER;  [facility]     [facility] GASTRIC BYPASS,OBESE<100CM SHAYLEE-EN-Y  2002    lost 300 pounds        Social History   I have reviewed this patient's social history and updated  "it with pertinent information if needed. Parker Acevedo  reports that he has been smoking cigarettes. He has a 0.30 pack-year smoking history. He has never used smokeless tobacco. He reports that he does not drink alcohol and does not use drugs.    Family History   I have reviewed this patient's family history and updated it with pertinent information if needed.  Family History   Problem Relation Age of Onset     Gastrointestinal Disease Mother         Crohns disease     Anxiety Disorder Mother      Thyroid Disease Mother         Grave's disease     Cancer Father         ear cancer-skin cancer/melanoma     Breast Cancer Maternal Grandmother      Macular Degeneration Maternal Grandfather      Anxiety Disorder Sister      Diabetes Maternal Uncle      Breast Cancer Other      Hypertension No family hx of      Hyperlipidemia No family hx of      Cerebrovascular Disease No family hx of      Prostate Cancer No family hx of      Depression No family hx of      Anesthesia Reaction No family hx of      Asthma No family hx of      Osteoporosis No family hx of      Genetic Disorder No family hx of      Obesity No family hx of      Mental Illness No family hx of      Substance Abuse No family hx of      Glaucoma No family hx of        Prior to Admission Medications   Prior to Admission Medications   Prescriptions Last Dose Informant Patient Reported? Taking?   EPINEPHrine (ANY BX GENERIC EQUIV) 0.3 MG/0.3ML injection 2-pack  Self Yes No   [facility]NAGED PATIENT  Self No No   Sig: Contact [facility] for patient specific medication information at [phone] on admission and discharge from the hospital.  Phones are answered 24 hours a day 7 days a week 365 days a year.    Providers - Choose \"CONTINUE HOME MED (no script)\" at discharge if patient treatment with home infusion will continue.   LORazepam (ATIVAN) 1 MG tablet  at prn Self No Yes   Sig: TAKE 1 TABLET (1MG) BY MOUTH AS NEEDED FOR " "ANXIETY WITH TPN AND MEDICATIONS . JUST ONCE A DAY (30 TO LAST 30 DAYS)   VENTOLIN  (90 Base) MCG/ACT inhaler  at prn Self No Yes   Sig: INHALE TWO PUFFS BY MOUTH EVERY 4 HOURS AS NEEDED FOR SHORTNESS OF BREATH /DYSPNEA OR WHEEZING   acetaminophen (TYLENOL) 32 mg/mL liquid  at prn Self No Yes   Sig: Take 15.65 mLs (500 mg) by mouth every 4 hours as needed for fever or mild pain   albuterol (PROAIR HFA/PROVENTIL HFA/VENTOLIN HFA) 108 (90 Base) MCG/ACT inhaler  at prn Self Yes Yes   Sig: Inhale 2 puffs into the lungs every 6 hours as needed   amphetamine-dextroamphetamine (ADDERALL) 20 MG tablet 5/6/2022 Self No Yes   Sig: TAKE ONE TABLET BY MOUTH ONCE DAILY   carvedilol (COREG) 6.25 MG tablet 5/6/2022 Self Yes Yes   Sig: Take 12.5 mg by mouth 2 times daily (with meals)    cyanocobalamin (CYANOCOBALAMIN) 1000 MCG/ML injection 5/2/2022 Self No Yes   Sig: INJECT 1 ML INTO THE MUSCLE EVERY 30 DAYS   diphenhydrAMINE (BENADRYL) 12.5 MG/5ML syrup  at prn Self No Yes   Sig: Take 25 mg by mouth every 4 hours as needed for itching, allergies or sleep   enoxaparin ANTICOAGULANT (LOVENOX) 120 MG/0.8ML syringe 5/6/2022 Self No Yes   Sig: Inject 0.8 mLs (120 mg) Subcutaneous daily   fentaNYL (DURAGESIC) 25 mcg/hr 72 hr patch 5/6/2022 Self No Yes   Sig: Place 1 patch onto the skin every 48 hours remove old patch. Fill Fill 04/29/22 and start 05/02/22. MUST BE SEEN IN CLINIC PRIOR TO FUTURE REFILLS   insulin syringe-needle U-100 (29G X 1/2\" 1 ML) 29G X 1/2\" 1 ML miscellaneous  Self No No   Sig: Use to inject b12 every 30 days   lactated ringers infusion  Self No No   Sig: Inject 1,000-2,000 mLs into the vein daily as needed   lidocaine (LIDODERM) 5 % patch  at prn Self No Yes   Sig: Place 1-2 patches onto the skin every 24 hours Wear for 12 hours, remove for 12 hours.  OK to cut to better fit to size.   naloxone (NARCAN) 4 MG/0.1ML nasal spray  Self No Yes   Sig: Spray 1 spray (4 mg) into one nostril alternating nostrils once " as needed for opioid reversal every 2-3 minutes until assistance arrives   nystatin (MYCOSTATIN) 090488 UNIT/GM external cream  at prn Self No Yes   Sig: APPLY TOPICALLY 2 TIMES DAILY   ondansetron (ZOFRAN-ODT) 8 MG ODT tab  at prn Self No Yes   Sig: DISSOLVE ONE TABLET ON TONGUE EVERY 8 HOURS AS NEEDED FOR NAUSEA   oxyCODONE (ROXICODONE) 5 MG/5ML solution 5/6/2022 Self No Yes   Sig: Take 5-10 mLs (5-10 mg) by mouth 3 times daily as needed for pain Max of 30mg/day. Put at least 4 hours between doses. Fill 04/29/22 and start 05/02/22. 30 day supply. MUST BE SEEN IN CLINIC PRIOR TO FUTURE REFILLS   pantoprazole (PROTONIX) 40 MG EC tablet 5/6/2022 Self No Yes   Sig: Take 1 tablet (40 mg) by mouth daily   parenteral nutrition - PTA/DISCHARGE ORDER  Self Yes No   Sig: Patient receives 2050 mL TPN every 14 hours through PICC at [facility].   polyethylene glycol (MIRALAX) 17 GM/Dose powder  at prn Self No Yes   Sig: Take 17 g by mouth daily   sucralfate (CARAFATE) 1 GM/10ML suspension  at prn Self No Yes   Sig: TAKE 10MLS  BY MOUTH FOUR TIMES A DAY AS NEEDED   vitamin D2 (ERGOCALCIFEROL) 78198 units (1250 mcg) capsule 5/7/2022 Self No Yes   Sig: TAKE 1 CAPSULE (50,000 UNITS) BY MOUTH ONCE A WEEK      Facility-Administered Medications: None     Allergies   Allergies   Allergen Reactions     Bactrim [Sulfamethoxazole W/Trimethoprim] Rash     Penicillins Anaphylaxis     Please see Antimicrobial Management Team allergy assessment note 10/10/2018. Patient reported tolerating amoxicillin.     Doxycycline Rash     Vancomycin Rash     Rash after receiving vancomycin 3/28/16 (infusion reaction?). Tolerated with slower infusion and diphenhydramine premed.       Physical Exam   Vital Signs: Temp: 99.7  F (37.6  C) Temp src: Oral BP: 120/80 Pulse: 85   Resp: 16 SpO2: 96 % O2 Device: None (Room air)    Weight: 179 lbs 12.8 oz    General Appearance: alert, conversant, no acute distress  Eyes: PERRLA, EOMI, no scleral icterus  or conjunctival injection  HEENT: MMM, no lymphadenopathy, neck supple  Respiratory: mild wheezing in upper lung fields, air movement appreciated throughout  Cardiovascular: RRR, no clicks rubs or murmurs, Cm line in place on R side with no erythema around opening or drainage  GI: PEG opening without concerning for infection/bleed, no distension, no tenderness to palpation  Skin: No open sores, erythema, or other concerning lesions  Musculoskeletal: RUE with significant swelling compared to left, mild color change in RUE, pitting edema in bilateral lower extremities  Neurologic: 5/5 strength in upper and lower extremities bilaterally, alert and oriented X3    Data   Data reviewed today:     Recent Labs   Lab 05/07/22  1423 05/05/22  1245   WBC 7.6 7.0   HGB 11.1* 11.2*   MCV 98 96   * 146*   INR 1.11  --      --    POTASSIUM 3.6  --    CHLORIDE 106  --    CO2 27  --    BUN 12  --    CR 0.61*  0.61*  --    ANIONGAP 10  --    SILAS 8.6  --    GLC 84  --    ALBUMIN 3.5  --    PROTTOTAL 6.8  --    BILITOTAL 0.7  --    ALKPHOS 61  --    ALT 23  --    AST 17  --    LIPASE 38*  --      US upper extremity  1. Partially occlusive thrombus in the right subclavian vein.  2. Previously seen thrombus in the right internal jugular and  innominate vein is not seen on today's exam, compared to prior  ultrasound 2/25/2022.    CXR  Mild right basilar streakiness may represent infection  versus atelectasis. Stable right IJ central venous catheter.

## 2022-05-07 NOTE — ED PROVIDER NOTES
[facility] ([facility])  5/07/22  History     Chief Complaint   Patient presents with     Blood Culture Positive     The history is provided by the patient and medical records.     Parker Acevedo is a 51 year old male with a past medical history significant for short gut syndrome, gastric ulcer, s/p bariatric surgery, GERD, tobacco use, anxiety, bacteremia and acute DVT who presents to the Emergency Department for evaluation of positive blood cultures from the last 2 days with ongoing fevers.  With complicated history has had history of central line bacteremia causes.  In January he was admitted which grew out staph epidermidis along with staph hominis which was treated with Ancef IV and patient did well with this.  Patient been doing well for the last several days has had fevers chills etc.  Patient also has chronic pain issues etc. abdominal pain he does have short gut syndrome etc. continues to have nausea also.  No chest pain reported but slight cough without productive sputum noted.  No dysuric symptoms.        Per chart review, patient had labs performed on 05/05/2022.  Patient was contacted by microbiology lab for positive blood cultures. 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 Has 2/3 blood culture sets positive for GPCs drawn off of central line. Cultures drawn due to several day history of fevers.  Patient was told to come to the ED for further evaluation and likely admission.      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     Complication of anesthesia      Difficulty swallowing      Gastric ulcer, unspecified as acute or chronic, without mention of hemorrhage,  perforation, or obstruction      Gastro-oesophageal reflux disease      Head injury      Hiatal hernia      Other bladder disorder      Other chronic pain      PONV (postoperative nausea and vomiting)      Severe malnutrition (H)     TPN     Short gut syndrome      Tobacco abuse        Past Surgical History:   Procedure Laterality Date     AMPUTATION       APPENDECTOMY       BACK SURGERY  11/3/2014    curve in the spine     BIOPSY LYMPH NODE CERVICAL N/A 2/20/2015    Procedure: BIOPSY LYMPH NODE CERVICAL;  Surgeon: Baron Scanlon MD;  [facility]     CHOLECYSTECTOMY       COLONOSCOPY N/A 7/14/2021    Procedure: COLONOSCOPY;  Surgeon: Jimbo Estrada MD;  Location: [facility] OR     COLONOSCOPY N/A 4/13/2022    Procedure: COLONOSCOPY;  Surgeon: Jimbo Estrada MD;  Location: [facility] OR     DISCECTOMY, FUSION CERVICAL ANTERIOR ONE LEVEL, COMBINED N/A 2/15/2017    Procedure: COMBINED DISCECTOMY, FUSION CERVICAL ANTERIOR ONE LEVEL;  Surgeon: Darren Campos MD;  [facility]     ENDOSCOPIC INSERTION TUBE GASTROSTOMY  9/9/2013    Procedure: ENDOSCOPIC INSERTION TUBE GASTROSTOMY;;  Surgeon: Francis Vyas MD;  [facility]     ENDOSCOPIC ULTRASOUND UPPER GASTROINTESTINAL TRACT (GI)  4/29/2011    Procedure:ENDOSCOPIC ULTRASOUND UPPER GASTROINTESTINAL TRACT (GI); Both Procedures done Conjointly; Surgeon:NEREIDA HOUSER; [facility]     ENDOSCOPIC ULTRASOUND UPPER GASTROINTESTINAL TRACT (GI)  9/9/2013    Procedure: ENDOSCOPIC ULTRASOUND UPPER GASTROINTESTINAL TRACT (GI);  Endoscopic Ultrasound Guide Gastrostomy Tube Placement  C-arm;  Surgeon: Noe Lizarraga MD;  [facility]     ENDOSCOPIC ULTRASOUND UPPER GASTROINTESTINAL TRACT (GI) N/A 2/24/2021    Procedure: ENDOSCOPIC ULTRASOUND, ESOPHAGOSCOPY / UPPER GASTROINTESTINAL TRACT (GI), esophagastrogastroduodenoscopy;  Surgeon: Berny Bach MD;  [facility]     ENDOSCOPY  03/25/11    EGD, [facility]      ENDOSCOPY  08/04/09    Upper Endoscopy, [facility]     ENDOSCOPY  01/05/09    Upper Endoscopy, [facility]     ESOPHAGOSCOPY, GASTROSCOPY, DUODENOSCOPY (EGD), COMBINED  4/20/2011    Procedure:COMBINED ESOPHAGOSCOPY, GASTROSCOPY, DUODENOSCOPY (EGD); Surgeon:BLU VYAS; [facility]     ESOPHAGOSCOPY, GASTROSCOPY, DUODENOSCOPY (EGD), COMBINED  6/15/2011    Procedure:COMBINED ESOPHAGOSCOPY, GASTROSCOPY, DUODENOSCOPY (EGD); Surgeon:BLU VYAS; [facility]     ESOPHAGOSCOPY, GASTROSCOPY, DUODENOSCOPY (EGD), COMBINED  6/12/2013    Procedure: COMBINED ESOPHAGOSCOPY, GASTROSCOPY, DUODENOSCOPY (EGD);;  Surgeon: Blu Vyas MD;  [facility]     ESOPHAGOSCOPY, GASTROSCOPY, DUODENOSCOPY (EGD), COMBINED  11/22/2013    Procedure: COMBINED ESOPHAGOSCOPY, GASTROSCOPY, DUODENOSCOPY (EGD);;  Surgeon: Blu Vyas MD;  [facility]     ESOPHAGOSCOPY, GASTROSCOPY, DUODENOSCOPY (EGD), COMBINED  4/30/2014    Procedure: COMBINED ESOPHAGOSCOPY, GASTROSCOPY, DUODENOSCOPY (EGD);  Surgeon: Blu Vyas MD;  [facility]     ESOPHAGOSCOPY, GASTROSCOPY, DUODENOSCOPY (EGD), COMBINED N/A 2/20/2015    Procedure: COMBINED ESOPHAGOSCOPY, GASTROSCOPY, DUODENOSCOPY (EGD), BIOPSY SINGLE OR MULTIPLE;  Surgeon: Baron Scanlon MD;  Location:  OR     ESOPHAGOSCOPY, GASTROSCOPY, DUODENOSCOPY (EGD), COMBINED N/A 9/30/2015    Procedure: COMBINED ESOPHAGOSCOPY, GASTROSCOPY, DUODENOSCOPY (EGD);  Surgeon: Blu Vyas MD;  [facility]     ESOPHAGOSCOPY, GASTROSCOPY, DUODENOSCOPY (EGD), COMBINED N/A 10/3/2019    Procedure: Upper Endoscopy;  Surgeon: Clif Morrow MD;  [facility]     GASTRECTOMY  6/22/2012    Procedure: GASTRECTOMY;  Open Approach, Excise Ulcers,Partial Gastrectomy, Esophagojejunostomy, Hiatal Hernia Repair, Extensive Lysis of Adhesions and Esaphagogastrodudenoscopy.;  Surgeon: Blu Vyas MD;  Location:  OR      GASTROJEJUNOSTOMY  08/26/09    Extensice enterolysis, partial resect. jejunum, part. resect gastric pouch, gastrojejunostomy anastomosis     HC ESOPH/GAS REFLUX TEST W NASAL IMPED ELECTRODE  8/5/2013    Procedure: ESOPHAGEAL IMPEDENCE FUNCTION TEST 1 HOUR OR LESS;  Surgeon: Halie Lang MD;  [facility]     HEAD & NECK SURGERY  2/15/2017    C5-C6     HERNIA REPAIR  2006    Umbilical hernia     HERNIORRHAPHY HIATAL  6/22/2012    Procedure: HERNIORRHAPHY HIATAL;;  Surgeon: Francis Vyas MD;  [facility]     HERNIORRHAPHY INGUINAL  11/22/2013    Procedure: HERNIORRHAPHY INGUINAL;;  Surgeon: Francis Vyas MD;  [facility]     INSERT PICC LINE Right 12/19/2019    Procedure: Picc Placement;  Surgeon: Per Dumont PA-C;  [facility]     INSERT PICC LINE Right 2/21/2020    Procedure: INSERTION, PICC;  Surgeon: Per Dumont PA-C;  [facility]     INSERT PORT VASCULAR ACCESS Right 12/19/2017    Procedure: INSERT PORT VASCULAR ACCESS;  Right Chest Port Placement ;  Surgeon: Lisandro Alejandro PA-C;  [facility]     INSERT PORT VASCULAR ACCESS Right 8/2/2018    Procedure: INSERT PORT VASCULAR ACCESS;  Place single lumen tunneled central venous access catheter;  Surgeon: Guy Jamil PA-C;  [facility]     IR CVC TUNNEL PLACEMENT > 5 YRS OF AGE  8/7/2019     IR CVC TUNNEL PLACEMENT > 5 YRS OF AGE  4/14/2020     IR CVC TUNNEL PLACEMENT > 5 YRS OF AGE  8/3/2020     IR CVC TUNNEL PLACEMENT > 5 YRS OF AGE  9/4/2020     IR CVC TUNNEL PLACEMENT > 5 YRS OF AGE  2/5/2021     IR CVC TUNNEL PLACEMENT > 5 YRS OF AGE  3/23/2021     IR CVC TUNNEL PLACEMENT > 5 YRS OF AGE  1/13/2022     IR CVC TUNNEL REMOVAL RIGHT  10/1/2019     IR CVC TUNNEL REMOVAL RIGHT  7/30/2020     IR CVC TUNNEL REMOVAL RIGHT  9/2/2020     IR CVC TUNNEL REMOVAL RIGHT  2/3/2021     IR CVC TUNNEL REMOVAL RIGHT  3/19/2021     IR CVC TUNNEL REMOVAL RIGHT  1/10/2022     IR CVC TUNNEL REVISION  RIGHT  5/7/2021     IR FOLLOW UP VISIT OUTPATIENT  8/7/2019     IR PICC PLACEMENT > 5 YRS OF AGE  3/7/2019     IR PICC PLACEMENT > 5 YRS OF AGE  12/19/2019     IR PICC PLACEMENT > 5 YRS OF AGE  2/21/2020     LAPAROTOMY EXPLORATORY  11/22/2013    Procedure: LAPAROTOMY EXPLORATORY;  Exploratory Laparotomy, Upper Endoscopy, Left Inguinal Hernia Repair;  Surgeon: Francis Vyas MD;  [facility]     ORTHOPEDIC SURGERY       PICC INSERTION Right 03/16/2017    5fr DL BioFlo PICC, 42cm (3cm external) in the R medial brachial vein w/ tip in the SVC RA junction.     PICC INSERTION Left 09/23/2017    5fr DL BioFlo PICC, 45cm (1cm external) in the L basilic vein w/ tip in the SVC RA junction.     PICC INSERTION Right 05/16/2019    5Fr - 43cm, Medial brachial vein, low SVC     PICC INSERTION Right 10/02/2019    5Fr - 43cm (2cm external), basilic vein, low SVC     SHAYLEE EN Y BOWEL  2003     SOFT TISSUE SURGERY       THORACIC SURGERY       TONSILLECTOMY       TRANSESOPHAGEAL ECHOCARDIOGRAM INTRAOPERATIVE N/A 1/8/2019    Procedure: TRANSESOPHAGEAL ECHOCARDIOGRAM INTRAOPERATIVE;  Surgeon: GENERIC ANESTHESIA PROVIDER;  [facility]     ZZC GASTRIC BYPASS,OBESE<100CM SHAYLEE-EN-Y  2002    lost 300 pounds       Family History   Problem Relation Age of Onset     Gastrointestinal Disease Mother         Crohns disease     Anxiety Disorder Mother      Thyroid Disease Mother         Grave's disease     Cancer Father         ear cancer-skin cancer/melanoma     Breast Cancer Maternal Grandmother      Macular Degeneration Maternal Grandfather      Anxiety Disorder Sister      Diabetes Maternal Uncle      Breast Cancer Other      Hypertension No family hx of      Hyperlipidemia No family hx of      Cerebrovascular Disease No family hx of      Prostate Cancer No family hx of      Depression No family hx of      Anesthesia Reaction No family hx of      Asthma No family hx of      Osteoporosis No family hx of      Genetic Disorder No  "family hx of      Obesity No family hx of      Mental Illness No family hx of      Substance Abuse No family hx of      Glaucoma No family hx of        Social History     Tobacco Use     Smoking status: Light Tobacco Smoker     Packs/day: 0.10     Years: 3.00     Pack years: 0.30     Types: Cigarettes     Smokeless tobacco: Never Used     Tobacco comment: 2/4/2021    smokes 3 cigarettes/day   Substance Use Topics     Alcohol use: No     Comment: quit 2002       Current Facility-Administered Medications   Medication     lidocaine (LMX4) cream     lidocaine 1 % 0.1-1 mL     pharmacy alert - intermittent dosing     sodium chloride (PF) 0.9% PF flush 3 mL     sodium chloride (PF) 0.9% PF flush 3 mL     Current Outpatient Medications   Medication     acetaminophen (TYLENOL) 32 mg/mL liquid     albuterol (PROAIR HFA/PROVENTIL HFA/VENTOLIN HFA) 108 (90 Base) MCG/ACT inhaler     amphetamine-dextroamphetamine (ADDERALL) 20 MG tablet     carvedilol (COREG) 6.25 MG tablet     cyanocobalamin (CYANOCOBALAMIN) 1000 MCG/ML injection     diphenhydrAMINE (BENADRYL) 12.5 MG/5ML syrup     enoxaparin ANTICOAGULANT (LOVENOX) 120 MG/0.8ML syringe     fentaNYL (DURAGESIC) 25 mcg/hr 72 hr patch     lidocaine (LIDODERM) 5 % patch     LORazepam (ATIVAN) 1 MG tablet     naloxone (NARCAN) 4 MG/0.1ML nasal spray     nystatin (MYCOSTATIN) 819895 UNIT/GM external cream     ondansetron (ZOFRAN-ODT) 8 MG ODT tab     oxyCODONE (ROXICODONE) 5 MG/5ML solution     pantoprazole (PROTONIX) 40 MG EC tablet     polyethylene glycol (MIRALAX) 17 GM/Dose powder     sucralfate (CARAFATE) 1 GM/10ML suspension     VENTOLIN  (90 Base) MCG/ACT inhaler     vitamin D2 (ERGOCALCIFEROL) 74478 units (1250 mcg) capsule     EPINEPHrine (ANY BX GENERIC EQUIV) 0.3 MG/0.3ML injection 2-pack     [facility] INFUSION MANAGED PATIENT     insulin syringe-needle U-100 (29G X 1/2\" 1 ML) 29G X 1/2\" 1 ML miscellaneous     lactated ringers infusion     parenteral nutrition " - PTA/DISCHARGE ORDER        Allergies   Allergen Reactions     Bactrim [Sulfamethoxazole W/Trimethoprim] Rash     Penicillins Anaphylaxis     Please see Antimicrobial Management Team allergy assessment note 10/10/2018. Patient reported tolerating amoxicillin.     Doxycycline Rash     Vancomycin Rash     Rash after receiving vancomycin 3/28/16 (infusion reaction?). Tolerated with slower infusion and diphenhydramine premed.        I have reviewed the Medications, Allergies, Past Medical and Surgical History, and Social History in the Epic system.    Review of Systems   Constitutional: Positive for appetite change, chills, fatigue and fever. Negative for activity change and unexpected weight change.   HENT: Negative for congestion, sore throat, trouble swallowing and voice change.    Eyes: Negative for redness and visual disturbance.   Respiratory: Positive for cough. Negative for shortness of breath and wheezing.    Cardiovascular: Negative for chest pain and leg swelling.   Gastrointestinal: Positive for abdominal pain, nausea and vomiting.   Genitourinary: Negative for difficulty urinating, dysuria and flank pain.   Musculoskeletal: Positive for myalgias. Negative for arthralgias, back pain, gait problem and neck stiffness.        Patient no swelling of the right arm has a known clot in the right arm is on Lovenox   Skin: Positive for wound. Negative for color change and rash.        Patient has a venting tube for gastric contents  Patient also with a Cm dual-lumen on the right without redness or drainage   Allergic/Immunologic: Negative for immunocompromised state.   Neurological: Positive for weakness. Negative for syncope, light-headedness and headaches.   Hematological: Bruises/bleeds easily (lovenx for allilary dvt right arm).   Psychiatric/Behavioral: Negative for agitation, confusion, decreased concentration and dysphoric mood. The patient is not nervous/anxious.    All other systems reviewed and are  "negative.    A complete review of systems was performed with pertinent positives and negatives noted in the HPI, and all other systems negative.    Physical Exam   BP: 120/80  Pulse: 83  Temp: 99.7  F (37.6  C)  Resp: 18  Height: 180.3 cm (5' 11\")  Weight: 81.6 kg (179 lb 12.8 oz)  SpO2: 95 %      Physical Exam  Vitals and nursing note reviewed.   Constitutional:       General: He is in acute distress.      Appearance: He is ill-appearing. He is not toxic-appearing or diaphoretic.      Comments: Patient chronically ill but nontoxic   HENT:      Head: Atraumatic.      Nose: Nose normal.      Mouth/Throat:      Mouth: Mucous membranes are dry.      Pharynx: Oropharynx is clear.   Eyes:      General: No scleral icterus.     Extraocular Movements: Extraocular movements intact.      Conjunctiva/sclera: Conjunctivae normal.      Pupils: Pupils are equal, round, and reactive to light.   Cardiovascular:      Rate and Rhythm: Normal rate and regular rhythm.      Heart sounds: Normal heart sounds.   Pulmonary:      Effort: No respiratory distress.      Breath sounds: Normal breath sounds.      Comments: Right tunneled Cm catheter without swelling  Abdominal:      General: Bowel sounds are normal. There is no distension.      Palpations: Abdomen is soft.      Tenderness: There is abdominal tenderness.      Comments: Patient with venting tube with nonbloody drainage noted   Musculoskeletal:         General: No swelling or tenderness.      Cervical back: Normal range of motion and neck supple.   Skin:     General: Skin is warm.      Capillary Refill: Capillary refill takes less than 2 seconds.      Coloration: Skin is not jaundiced or pale.      Findings: No rash.   Neurological:      General: No focal deficit present.      Mental Status: He is oriented to person, place, and time. Mental status is at baseline.   Psychiatric:      Comments: Patient referred here in the ER appropriate         ED Course     At 1:06 PM the " patient was seen and examined by Jeet Orozco MD in Room [facility].   Evaluated patient's labs etc. team also grew out staph epidermis.  Reviewed previous records in January when he is admitted also I consulted infectious disease also.  They reviewed records also recommended Ancef 2 g every 8 hours.  Patient here in the ER had a peripheral IV placed in the left arm had an ultrasound of the right upper extremity revealing some clot noted.  Patient currently on Lovenox.  Patient had blood cultures x3 ordered also 1 peripheral and 1 from each Cm line.  Vitally patient been stable in the ER.  Patient's urinalysis revealed 5 red cells no white cells.  COVID testing negative.  Lactic acid 0.7.  Pro-[place] was 0.20.  White count 7.6.  Hemoglobin 9.1.  Platelets are 138.  CRP is 34.  Sodium 143 potassium 3.6.  Bicarb 27 gap is 10 BUN is 12 creatinine 0.61.  Liver function test normal limits lipase 38 troponin 4  INR 1.11.    As are the ER patient had received IV fluids Zofran for nausea Dilaudid for pain control here is been cooperative his wife is present also.  Patient will be admitted to medicine service with infectious disease consultation most likely this is recurrent central line bacteremia.  Vitally stable otherwise admitted [facility] bed.  Patient agrees stable     Procedures                   Results for orders placed or performed during the hospital encounter of 05/07/22 (from the past 24 hour(s))   EKG 12-lead, tracing only   Result Value Ref Range    Systolic Blood Pressure  mmHg    Diastolic Blood Pressure  mmHg    Ventricular Rate 66 BPM    Atrial Rate 66 BPM    VT Interval 152 ms    QRS Duration 94 ms     ms    QTc 425 ms    P Axis 69 degrees    R AXIS 40 degrees    T Axis 43 degrees    Interpretation ECG Sinus rhythm  Normal ECG      XR Chest 2 Views    Narrative    EXAM: XR CHEST 2 VW 5/7/2022 1:44 PM    HISTORY: cough and fever.    COMPARISON: 2/26/2022.    TECHNIQUE: Upright frontal and  lateral views of the chest.    FINDINGS: Normal trachea, heart and pulmonary vasculature. Tunneled  right IJ double lumen central catheter tip projects over mid/lower  SVC. No pneumothorax, pleural effusion, or airspace opacities. Mild  right basilar streakiness. Unremarkable upper abdomen.      Impression    IMPRESSION: Mild right basilar streakiness may represent infection  versus atelectasis. Stable right IJ central venous catheter.    I have personally reviewed the examination and initial interpretation  and I agree with the findings.    MANUEL CALDWELL MD         SYSTEM ID:  [id number]   US Upper Extremity Venous Duplex Right    Narrative    EXAMINATION: DOPPLER VENOUS ULTRASOUND OF THE RIGHT UPPER EXTREMITY,  5/7/2022 2:01 PM     COMPARISON: Ultrasound 2/25/2022    HISTORY: swelling hx of dvt has right [place] also    TECHNIQUE:  Gray-scale evaluation with compression, spectral flow and  color Doppler assessment of the deep venous system of the right upper  extremity.    FINDINGS:  Right: Normal blood flow and waveforms are demonstrated in the  internal jugular, innominate and axillary veins. There is hypoechoic  thrombus visualized within the median subclavian vein. There is normal  compressibility of the brachial, basilic and cephalic veins.      Impression    IMPRESSION:    1. Partially occlusive thrombus in the right subclavian vein.  2. Previously seen thrombus in the right internal jugular and  innominate vein is not seen on today's exam, compared to prior  ultrasound 2/25/2022.    I have personally reviewed the examination and initial interpretation  and I agree with the findings.    MANEUL CALDWELL MD         SYSTEM ID:  [id number]   CBC with platelets differential    Narrative    The following orders were created for panel order CBC with platelets differential.  Procedure                               Abnormality         Status                     ---------                               -----------          ------                     CBC with platelets and d...[[id number]]  Abnormal            Final result                 Please view results for these tests on the individual orders.   CRP inflammation   Result Value Ref Range    CRP Inflammation 34.0 (H) 0.0 - 8.0 mg/L   INR   Result Value Ref Range    INR 1.11 0.85 - 1.15   Partial thromboplastin time   Result Value Ref Range    aPTT 35 22 - 38 Seconds   Comprehensive metabolic panel   Result Value Ref Range    Sodium 143 133 - 144 mmol/L    Potassium 3.6 3.4 - 5.3 mmol/L    Chloride 106 94 - 109 mmol/L    Carbon Dioxide (CO2) 27 20 - 32 mmol/L    Anion Gap 10 3 - 14 mmol/L    Urea Nitrogen 12 7 - 30 mg/dL    Creatinine 0.61 (L) 0.66 - 1.25 mg/dL    Calcium 8.6 8.5 - 10.1 mg/dL    Glucose 84 70 - 99 mg/dL    Alkaline Phosphatase 61 40 - 150 U/L    AST 17 0 - 45 U/L    ALT 23 0 - 70 U/L    Protein Total 6.8 6.8 - 8.8 g/dL    Albumin 3.5 3.4 - 5.0 g/dL    Bilirubin Total 0.7 0.2 - 1.3 mg/dL    GFR Estimate >90 >60 mL/min/1.73m2   Lipase   Result Value Ref Range    Lipase 38 (L) 73 - 393 U/L   Troponin I   Result Value Ref Range    Troponin I High Sensitivity 4 <79 ng/L   Nt probnp inpatient (BNP)   Result Value Ref Range    N terminal Pro BNP Inpatient 157 0 - 450 pg/mL   Procalcitonin   Result Value Ref Range    Procalcitonin 0.20 (H) <0.05 ng/mL   CBC with platelets and differential   Result Value Ref Range    WBC Count 7.6 4.0 - 11.0 10e3/uL    RBC Count 3.63 (L) 4.40 - 5.90 10e6/uL    Hemoglobin 11.1 (L) 13.3 - 17.7 g/dL    Hematocrit 35.4 (L) 40.0 - 53.0 %    MCV 98 78 - 100 fL    MCH 30.6 26.5 - 33.0 pg    MCHC 31.4 (L) 31.5 - 36.5 g/dL    RDW 14.4 10.0 - 15.0 %    Platelet Count 138 (L) 150 - 450 10e3/uL    % Neutrophils 70 %    % Lymphocytes 16 %    % Monocytes 13 %    % Eosinophils 1 %    % Basophils 0 %    % Immature Granulocytes 0 %    NRBCs per 100 WBC 0 <1 /100    Absolute Neutrophils 5.3 1.6 - 8.3 10e3/uL    Absolute Lymphocytes 1.2 0.8 - 5.3 10e3/uL     Absolute Monocytes 1.0 0.0 - 1.3 10e3/uL    Absolute Eosinophils 0.1 0.0 - 0.7 10e3/uL    Absolute Basophils 0.0 0.0 - 0.2 10e3/uL    Absolute Immature Granulocytes 0.0 <=0.4 10e3/uL    Absolute NRBCs 0.0 10e3/uL   Lactic acid whole blood   Result Value Ref Range    Lactic Acid 0.7 0.7 - 2.0 mmol/L   Symptomatic; Unknown COVID-19 Virus (Coronavirus) by PCR Nasopharyngeal    Specimen: Nasopharyngeal; Swab   Result Value Ref Range    SARS CoV2 PCR Negative Negative, Testing sent to reference lab. Results will be returned via unsolicited result    Narrative    Testing was performed using the [employer] Xpress SARS-CoV-2 Assay on the  [employer]. Additional information about  this Emergency Use Authorization (EUA) assay can be found via the Lab  Guide. This test should be ordered for the detection of SARS-CoV-2 in  individuals who meet SARS-CoV-2 clinical and/or epidemiological  criteria. Test performance is unknown in asymptomatic patients. This  test is for in vitro diagnostic use under the FDA EUA for  laboratories certified under CLIA to perform high complexity testing.  This test has not been FDA cleared or approved. A negative result  does not rule out the presence of PCR inhibitors in the specimen or  target RNA in concentration below the limit of detection for the  assay. The possibility of a false negative should be considered if  the patient's recent exposure or clinical presentation suggests  COVID-19. This test was validated by the [facility]. This laboratory is certified under  the Clinical Laboratory Improvement Amendments of 1988 (CLIA-88) as  qualified to perform high complexity laboratory testing.     UA with Microscopic reflex to Culture    Specimen: Urine, Midstream   Result Value Ref Range    Color Urine Yellow Colorless, Straw, Light Yellow, Yellow    Appearance Urine Clear Clear    Glucose Urine Negative Negative mg/dL     Bilirubin Urine Negative Negative    Ketones Urine 60  (A) Negative mg/dL    Specific Gravity Urine 1.020 1.003 - 1.035    Blood Urine Small (A) Negative    pH Urine 6.5 5.0 - 7.0    Protein Albumin Urine Negative Negative mg/dL    Urobilinogen Urine 3.0 (A) Normal, 2.0 mg/dL    Nitrite Urine Negative Negative    Leukocyte Esterase Urine Negative Negative    Mucus Urine Present (A) None Seen /LPF    RBC Urine 5 (H) <=2 /HPF    WBC Urine 0 <=5 /HPF    Squamous Epithelials Urine <1 <=1 /HPF    Narrative    Urine Culture not indicated     *Note: Due to a large number of results and/or encounters for the requested time period, some results have not been displayed. A complete set of results can be found in Results Review.     Medications   lidocaine 1 % 0.1-1 mL (has no administration in time range)   lidocaine (LMX4) cream (has no administration in time range)   sodium chloride (PF) 0.9% PF flush 3 mL (has no administration in time range)   sodium chloride (PF) 0.9% PF flush 3 mL (has no administration in time range)   pharmacy alert - intermittent dosing (has no administration in time range)   0.9% sodium chloride BOLUS (1,000 mLs Intravenous New Bag 5/7/22 1453)   ceFAZolin (ANCEF) intermittent infusion 2 g in 100 mL dextrose PRE-MIX (2 g Intravenous New Bag 5/7/22 1520)   ondansetron (ZOFRAN) injection 4 mg (4 mg Intravenous Given 5/7/22 1449)   HYDROmorphone (PF) (DILAUDID) injection 0.5 mg (0.5 mg Intravenous Given 5/7/22 1448)   HYDROmorphone (PF) (DILAUDID) injection 0.5 mg (0.5 mg Intravenous Given 5/7/22 1642)             Assessments & Plan (with Medical Decision Making)  51-year-old male history of short gut syndrome history of recurrent bacteremia associated with central line has a right Cm catheter in patient's had fever last several days had positive blood cultures done a couple days ago of staph epidermidis.  Patient mated for similar findings in January treated with Ancef at this point discussion  with infectious disease agrees will treat with Ancef 2 g IV after we repeated blood cultures from both port and peripherally.  IV fluids given Dilaudid for pain control Zofran for nausea other labs stable here in the ER without significant outliers.  As noted will be admitted to medicine for ongoing treatment with infectious disease consultation concerning for central line bacteremia.  Vitally stable without signs of any hypotension tachycardia etc.  COVID testing negative.       I have reviewed the nursing notes.    I have reviewed the findings, diagnosis, plan and need for follow up with the patient.    New Prescriptions    No medications on file       Final diagnoses:   Bacteremia associated with intravascular line, initial encounter (H)   Thrombosis of right subclavian vein (H)   Swelling of right upper extremity   Dehydration   Nausea and vomiting, intractability of vomiting not specified, unspecified vomiting type   Short bowel syndrome       I, Flory Carter am serving as a trained medical scribe to document services personally performed by Jeet Orozco MD, based on the provider's statements to me.      I, Jeet Orozco MD, was physically present and have reviewed and verified the accuracy of this note documented by Flory Carter.     Jeet Orozco MD  5/7/2022   [facility] EMERGENCY DEPARTMENT    This note was created at least in part by the use of dragon voice dictation system. Inadvertent typographical errors may still exist.  Jeet Orozco MD.    Patient evaluated in the emergency department during the COVID-19 pandemic period. Careful attention to patients safety was addressed throughout the evaluation. Evaluation and treatment management was initiated with disposition made efficiently and appropriate as possible to minimize any risk of potential exposure to patient during this evaluation.       Jeet Orozco MD  05/07/22 5400

## 2022-05-07 NOTE — ED TRIAGE NOTES
"Pt states he had blood cultures drawn yesterday, was told BCs are positive and needs antibiotics. Also states R hand is more swollen than L side and he has R chest wall Cm Port and has had a blood clot before with swollen arm. Pt c/o generalized weakness, fatigue, headaches, and chronic pain - has fentanyl patch R arm.      Triage Assessment     Row Name 05/07/22 1251       Triage Assessment (Adult)    Airway WDL WDL       Respiratory WDL    Respiratory WDL WDL       Skin Circulation/Temperature WDL    Skin Circulation/Temperature WDL all;X  \"lung congestion\"       Cardiac WDL    Cardiac WDL WDL       Peripheral/Neurovascular WDL    Peripheral Neurovascular WDL WDL       Cognitive/Neuro/Behavioral WDL    Cognitive/Neuro/Behavioral WDL WDL              "

## 2022-05-08 ENCOUNTER — HOME INFUSION (PRE-WILLOW HOME INFUSION) (OUTPATIENT)
Dept: PHARMACY | Facility: CLINIC | Age: 52
End: 2022-05-08

## 2022-05-08 LAB
ALBUMIN SERPL-MCNC: 3.3 G/DL (ref 3.4–5)
ALP SERPL-CCNC: 58 U/L (ref 40–150)
ALT SERPL W P-5'-P-CCNC: 20 U/L (ref 0–70)
ANION GAP SERPL CALCULATED.3IONS-SCNC: 5 MMOL/L (ref 3–14)
AST SERPL W P-5'-P-CCNC: 12 U/L (ref 0–45)
ATRIAL RATE - MUSE: 66 BPM
BACTERIA BLD CULT: ABNORMAL
BASOPHILS # BLD AUTO: 0 10E3/UL (ref 0–0.2)
BASOPHILS NFR BLD AUTO: 0 %
BILIRUB DIRECT SERPL-MCNC: <0.1 MG/DL (ref 0–0.2)
BILIRUB SERPL-MCNC: 0.6 MG/DL (ref 0.2–1.3)
BUN SERPL-MCNC: 8 MG/DL (ref 7–30)
CALCIUM SERPL-MCNC: 8.6 MG/DL (ref 8.5–10.1)
CHLORIDE BLD-SCNC: 109 MMOL/L (ref 94–109)
CO2 SERPL-SCNC: 29 MMOL/L (ref 20–32)
CREAT SERPL-MCNC: 0.64 MG/DL (ref 0.66–1.25)
DIASTOLIC BLOOD PRESSURE - MUSE: NORMAL MMHG
EOSINOPHIL # BLD AUTO: 0.1 10E3/UL (ref 0–0.7)
EOSINOPHIL NFR BLD AUTO: 3 %
ERYTHROCYTE [DISTWIDTH] IN BLOOD BY AUTOMATED COUNT: 14.1 % (ref 10–15)
GFR SERPL CREATININE-BSD FRML MDRD: >90 ML/MIN/1.73M2
GLUCOSE BLD-MCNC: 110 MG/DL (ref 70–99)
GLUCOSE BLDC GLUCOMTR-MCNC: 109 MG/DL (ref 70–99)
GLUCOSE BLDC GLUCOMTR-MCNC: 114 MG/DL (ref 70–99)
HCT VFR BLD AUTO: 34.7 % (ref 40–53)
HGB BLD-MCNC: 11.1 G/DL (ref 13.3–17.7)
HOLD SPECIMEN: NORMAL
IMM GRANULOCYTES # BLD: 0 10E3/UL
IMM GRANULOCYTES NFR BLD: 0 %
INR PPP: 1.1 (ref 0.85–1.15)
INTERPRETATION ECG - MUSE: NORMAL
LYMPHOCYTES # BLD AUTO: 1.1 10E3/UL (ref 0.8–5.3)
LYMPHOCYTES NFR BLD AUTO: 21 %
MAGNESIUM SERPL-MCNC: 2 MG/DL (ref 1.6–2.3)
MCH RBC QN AUTO: 30.5 PG (ref 26.5–33)
MCHC RBC AUTO-ENTMCNC: 32 G/DL (ref 31.5–36.5)
MCV RBC AUTO: 95 FL (ref 78–100)
MONOCYTES # BLD AUTO: 0.8 10E3/UL (ref 0–1.3)
MONOCYTES NFR BLD AUTO: 15 %
MRSA DNA SPEC QL NAA+PROBE: NEGATIVE
NEUTROPHILS # BLD AUTO: 3.1 10E3/UL (ref 1.6–8.3)
NEUTROPHILS NFR BLD AUTO: 61 %
NRBC # BLD AUTO: 0 10E3/UL
NRBC BLD AUTO-RTO: 0 /100
P AXIS - MUSE: 69 DEGREES
PHOSPHATE SERPL-MCNC: 3.5 MG/DL (ref 2.5–4.5)
PLATELET # BLD AUTO: 149 10E3/UL (ref 150–450)
POTASSIUM BLD-SCNC: 3.6 MMOL/L (ref 3.4–5.3)
PR INTERVAL - MUSE: 152 MS
PROT SERPL-MCNC: 6.7 G/DL (ref 6.8–8.8)
QRS DURATION - MUSE: 94 MS
QT - MUSE: 406 MS
QTC - MUSE: 425 MS
R AXIS - MUSE: 40 DEGREES
RBC # BLD AUTO: 3.64 10E6/UL (ref 4.4–5.9)
SA TARGET DNA: NEGATIVE
SODIUM SERPL-SCNC: 143 MMOL/L (ref 133–144)
SYSTOLIC BLOOD PRESSURE - MUSE: NORMAL MMHG
T AXIS - MUSE: 43 DEGREES
VENTRICULAR RATE- MUSE: 66 BPM
WBC # BLD AUTO: 5.1 10E3/UL (ref 4–11)

## 2022-05-08 PROCEDURE — 250N000013 HC RX MED GY IP 250 OP 250 PS 637

## 2022-05-08 PROCEDURE — 99207 PR CDG-CUT & PASTE-POTENTIAL IMPACT ON LEVEL: CPT | Performed by: STUDENT IN AN ORGANIZED HEALTH CARE EDUCATION/TRAINING PROGRAM

## 2022-05-08 PROCEDURE — 999N000248 HC STATISTIC IV INSERT WITH US BY RN

## 2022-05-08 PROCEDURE — 250N000011 HC RX IP 250 OP 636

## 2022-05-08 PROCEDURE — 36592 COLLECT BLOOD FROM PICC: CPT | Performed by: INTERNAL MEDICINE

## 2022-05-08 PROCEDURE — 87641 MR-STAPH DNA AMP PROBE: CPT

## 2022-05-08 PROCEDURE — 84100 ASSAY OF PHOSPHORUS: CPT | Performed by: INTERNAL MEDICINE

## 2022-05-08 PROCEDURE — 84134 ASSAY OF PREALBUMIN: CPT | Performed by: INTERNAL MEDICINE

## 2022-05-08 PROCEDURE — 250N000013 HC RX MED GY IP 250 OP 250 PS 637: Performed by: STUDENT IN AN ORGANIZED HEALTH CARE EDUCATION/TRAINING PROGRAM

## 2022-05-08 PROCEDURE — 83735 ASSAY OF MAGNESIUM: CPT | Performed by: INTERNAL MEDICINE

## 2022-05-08 PROCEDURE — 85025 COMPLETE CBC W/AUTO DIFF WBC: CPT

## 2022-05-08 PROCEDURE — 82248 BILIRUBIN DIRECT: CPT | Performed by: INTERNAL MEDICINE

## 2022-05-08 PROCEDURE — 250N000009 HC RX 250: Performed by: STUDENT IN AN ORGANIZED HEALTH CARE EDUCATION/TRAINING PROGRAM

## 2022-05-08 PROCEDURE — 99222 1ST HOSP IP/OBS MODERATE 55: CPT | Performed by: INTERNAL MEDICINE

## 2022-05-08 PROCEDURE — 99233 SBSQ HOSP IP/OBS HIGH 50: CPT | Performed by: STUDENT IN AN ORGANIZED HEALTH CARE EDUCATION/TRAINING PROGRAM

## 2022-05-08 PROCEDURE — 120N000002 HC R&B MED SURG/OB UMMC

## 2022-05-08 PROCEDURE — 85610 PROTHROMBIN TIME: CPT

## 2022-05-08 PROCEDURE — 36592 COLLECT BLOOD FROM PICC: CPT | Performed by: STUDENT IN AN ORGANIZED HEALTH CARE EDUCATION/TRAINING PROGRAM

## 2022-05-08 PROCEDURE — 36592 COLLECT BLOOD FROM PICC: CPT

## 2022-05-08 PROCEDURE — 99223 1ST HOSP IP/OBS HIGH 75: CPT | Mod: GC | Performed by: INTERNAL MEDICINE

## 2022-05-08 PROCEDURE — 87077 CULTURE AEROBIC IDENTIFY: CPT | Performed by: STUDENT IN AN ORGANIZED HEALTH CARE EDUCATION/TRAINING PROGRAM

## 2022-05-08 PROCEDURE — 80053 COMPREHEN METABOLIC PANEL: CPT | Performed by: INTERNAL MEDICINE

## 2022-05-08 PROCEDURE — 250N000011 HC RX IP 250 OP 636: Performed by: STUDENT IN AN ORGANIZED HEALTH CARE EDUCATION/TRAINING PROGRAM

## 2022-05-08 PROCEDURE — 36415 COLL VENOUS BLD VENIPUNCTURE: CPT | Performed by: STUDENT IN AN ORGANIZED HEALTH CARE EDUCATION/TRAINING PROGRAM

## 2022-05-08 RX ORDER — HEPARIN SODIUM (PORCINE) LOCK FLUSH IV SOLN 100 UNIT/ML 100 UNIT/ML
5-10 SOLUTION INTRAVENOUS
Status: DISCONTINUED | OUTPATIENT
Start: 2022-05-08 | End: 2022-05-12 | Stop reason: HOSPADM

## 2022-05-08 RX ORDER — DEXTROAMPHETAMINE SACCHARATE, AMPHETAMINE ASPARTATE, DEXTROAMPHETAMINE SULFATE AND AMPHETAMINE SULFATE 2.5; 2.5; 2.5; 2.5 MG/1; MG/1; MG/1; MG/1
20 TABLET ORAL DAILY
Status: DISCONTINUED | OUTPATIENT
Start: 2022-05-08 | End: 2022-05-12 | Stop reason: HOSPADM

## 2022-05-08 RX ORDER — HEPARIN SODIUM,PORCINE 10 UNIT/ML
5-10 VIAL (ML) INTRAVENOUS
Status: DISCONTINUED | OUTPATIENT
Start: 2022-05-08 | End: 2022-05-12 | Stop reason: HOSPADM

## 2022-05-08 RX ORDER — DEXTROSE MONOHYDRATE 100 MG/ML
INJECTION, SOLUTION INTRAVENOUS CONTINUOUS PRN
Status: DISCONTINUED | OUTPATIENT
Start: 2022-05-08 | End: 2022-05-12 | Stop reason: HOSPADM

## 2022-05-08 RX ORDER — FENTANYL 25 UG/1
25 PATCH TRANSDERMAL
Status: DISCONTINUED | OUTPATIENT
Start: 2022-05-08 | End: 2022-05-12 | Stop reason: HOSPADM

## 2022-05-08 RX ORDER — DEXTROAMPHETAMINE SACCHARATE, AMPHETAMINE ASPARTATE, DEXTROAMPHETAMINE SULFATE AND AMPHETAMINE SULFATE 2.5; 2.5; 2.5; 2.5 MG/1; MG/1; MG/1; MG/1
20 TABLET ORAL DAILY
Status: DISCONTINUED | OUTPATIENT
Start: 2022-05-08 | End: 2022-05-08

## 2022-05-08 RX ORDER — HEPARIN SODIUM,PORCINE 10 UNIT/ML
5-10 VIAL (ML) INTRAVENOUS EVERY 24 HOURS
Status: DISCONTINUED | OUTPATIENT
Start: 2022-05-08 | End: 2022-05-12 | Stop reason: HOSPADM

## 2022-05-08 RX ADMIN — LORAZEPAM 1 MG: 1 TABLET ORAL at 21:26

## 2022-05-08 RX ADMIN — ASCORBIC ACID, VITAMIN A PALMITATE, CHOLECALCIFEROL, THIAMINE HYDROCHLORIDE, RIBOFLAVIN-5 PHOSPHATE SODIUM, PYRIDOXINE HYDROCHLORIDE, NIACINAMIDE, DEXPANTHENOL, ALPHA-TOCOPHEROL ACETATE, VITAMIN K1, FOLIC ACID, BIOTIN, CYANOCOBALAMIN: 200; 3300; 200; 6; 3.6; 6; 40; 15; 10; 150; 600; 60; 5 INJECTION, SOLUTION INTRAVENOUS at 21:37

## 2022-05-08 RX ADMIN — CEFAZOLIN SODIUM 2 G: 2 INJECTION, SOLUTION INTRAVENOUS at 13:34

## 2022-05-08 RX ADMIN — OXYCODONE HYDROCHLORIDE 10 MG: 5 SOLUTION ORAL at 14:40

## 2022-05-08 RX ADMIN — CARVEDILOL 12.5 MG: 12.5 TABLET, FILM COATED ORAL at 08:22

## 2022-05-08 RX ADMIN — DEXTROAMPHETAMINE SACCHARATE, AMPHETAMINE ASPARTATE, DEXTROAMPHETAMINE SULFATE AND AMPHETAMINE SULFATE 20 MG: 2.5; 2.5; 2.5; 2.5 TABLET ORAL at 09:16

## 2022-05-08 RX ADMIN — ENOXAPARIN SODIUM 120 MG: 120 INJECTION SUBCUTANEOUS at 21:29

## 2022-05-08 RX ADMIN — SUCRALFATE 1 G: 1 SUSPENSION ORAL at 05:45

## 2022-05-08 RX ADMIN — CEFAZOLIN SODIUM 2 G: 2 INJECTION, SOLUTION INTRAVENOUS at 05:45

## 2022-05-08 RX ADMIN — CEFAZOLIN SODIUM 2 G: 2 INJECTION, SOLUTION INTRAVENOUS at 21:34

## 2022-05-08 RX ADMIN — PANTOPRAZOLE SODIUM 40 MG: 40 TABLET, DELAYED RELEASE ORAL at 08:22

## 2022-05-08 RX ADMIN — ONDANSETRON 4 MG: 4 TABLET, ORALLY DISINTEGRATING ORAL at 21:28

## 2022-05-08 RX ADMIN — Medication 5 ML: at 08:25

## 2022-05-08 RX ADMIN — ONDANSETRON 4 MG: 4 TABLET, ORALLY DISINTEGRATING ORAL at 05:01

## 2022-05-08 RX ADMIN — FENTANYL 1 PATCH: 25 PATCH, EXTENDED RELEASE TRANSDERMAL at 21:31

## 2022-05-08 RX ADMIN — ONDANSETRON 4 MG: 4 TABLET, ORALLY DISINTEGRATING ORAL at 14:39

## 2022-05-08 RX ADMIN — Medication 3 ML: at 09:59

## 2022-05-08 RX ADMIN — OXYCODONE HYDROCHLORIDE 10 MG: 5 SOLUTION ORAL at 05:01

## 2022-05-08 RX ADMIN — CARVEDILOL 12.5 MG: 12.5 TABLET, FILM COATED ORAL at 17:45

## 2022-05-08 RX ADMIN — OXYCODONE HYDROCHLORIDE 10 MG: 5 SOLUTION ORAL at 21:27

## 2022-05-08 RX ADMIN — Medication 5 ML: at 23:14

## 2022-05-08 ASSESSMENT — ACTIVITIES OF DAILY LIVING (ADL)
ADLS_ACUITY_SCORE: 5
FALL_HISTORY_WITHIN_LAST_SIX_MONTHS: NO
ADLS_ACUITY_SCORE: 5
ADLS_ACUITY_SCORE: 14
ADLS_ACUITY_SCORE: 5
EQUIPMENT_CURRENTLY_USED_AT_HOME: OTHER (SEE COMMENTS)
ADLS_ACUITY_SCORE: 14
ADLS_ACUITY_SCORE: 14
ADLS_ACUITY_SCORE: 5
DEPENDENT_IADLS:: INDEPENDENT
ADLS_ACUITY_SCORE: 5
ADLS_ACUITY_SCORE: 14
ADLS_ACUITY_SCORE: 5
ADLS_ACUITY_SCORE: 5
ADLS_ACUITY_SCORE: 14
ADLS_ACUITY_SCORE: 5

## 2022-05-08 NOTE — PROGRESS NOTES
[facility]    Medicine Progress Note - Hospitalist Service, GOLD TEAM 8    Date of Admission:  5/7/2022    Assessment & Plan        Parker Acevedo is a 51 year old male admitted on 5/7/2022. He has a history of bariatric surgery (RYGB, esophagojejunostomy complicated by short gut syndrome) on chronic TPN, recurrent CLABSI, short-gut syndrome and hx of repeated line-releated bloody stream infections and is admitted for concern for staph epi bacteremia; likely CLABSI.     #staph epi bacteremia  #fevers  Patient was having multiple days of fever, instructed by ID to have blood cultures on 5/5 which resulted with staph epi in 2/3 blood cultures (drawn off of central line). Was contacted by ID that he should present to the emergency department for further evaluation and treatment. Here he was vitally stable, afebrile, only significant complaint is headache and tiredness. No focal infective source, Cm without drainage/purulence/bleeding/erythema though suspect for source as he has had positive BC prior while Cm was in place (S epi then).   - cefazolin 2g Q8h  - if fevers can consider switching to vanc  - ID consult  - TTE today  - IR consult for cm removal; will need 72h line holiday prior to line replacement.   - daily bcx x 2 until NGTD for 48-72 consecutive hours     #R subclavian DVT  Patient admitted back 2/23 with R subclavian DVT (extended from proximal axillary vein to R internal jugular and brachiocephalic vein). Treated with heparin drip initially but heme/onc consulted and noted change from heparin infusion to subcu enoxaparin with plan to continue enoxaparin for 3-6 months, repeat US at 3 months. Still w/ DVT this presentation. Improved on imaging though still having swelling in R arm, pt reporting it is improved from when he first was diagnosed. Was initially discharged on 80mg BID but currently on enoxaparin 120 daily (unclear when this was  changed, can't find in chart review). At this time will continue enoxaparin home dose and get hematology involved to see if any change is indicated as it seems as though there is significant clot burden   - enoxaparin 120mg daily injection      #s/p RYGB c/p short gut syndrome  Patient with history of bariatric surgery now with short gut syndrome. Reports regular diarrhea, nausea, abd pain, difficulty with eating occasionally and supplements nutrition with TPN per Toi. Reports that the symptoms he usually experiences from these are not too different, not concerned for new GI process or infectious process. Feeding tube in place as well.   - Stop TPN, start PPN  - regular diet  - will check phos and mag  - continue home pantoprazole 40mg daily  - zofran PRN  - continue home carafate PRN  - PTA B12, vit D, other vitamins     #anemia  Hgb 11.1 on admission, appears at baseline  - monitor  - transfuse if <7     Diet:  Full, PPN  DVT Prophylaxis: enoxaparin 120mg daily  Sanchez Catheter: Not present  Central Lines: PRESENT    Disposition Plan   Expected Discharge: 4-5 days     The patient's care was discussed with the Patient.    Reji Nicole MD  Hospitalist Service, GOLD TEAM [address] [place] [address]  Securely message with the [employer] Web Console (learn more here)  Text page via [facility] Paging/Directory   Please see signed in provider for up to date coverage information      Clinically Significant Risk Factors Present on Admission                 ______________________________________________________________________    Interval History   Feeling ok. Appetite fair. No chest pain, or shortness of breath.     Data reviewed today: I reviewed all medications, new labs and imaging results over the last 24 hours. I personally reviewed no images or EKG's today.    Physical Exam   Vital Signs: Temp: 97.6  F (36.4  C) Temp src: Temporal BP: 134/88 Pulse: 74   Resp: 18 SpO2: 99 % O2 Device: None  (Room air)    Weight: 178 lbs 14.4 oz  Exam  Gen: awake and alert, appears comfortable, appears stated age  HEENT: NCAT, sclerae anicteric  CV: RRR, extremities warm and well perfused, no lower extremity edema  Pulm: normal work of breathing  GI: Nontender, nondistended  Skin: warm, no jaundice, right sided hickmann c/d/i, 1x1 cm bruise on tip of great toe  Neuro: Aox3, speech normal, moves all extremities symmetrically and equally  Psych: mood is good, affect is congruent      Data   Recent Labs   Lab 05/08/22  1321 05/08/22  1003 05/08/22  0643 05/07/22  1423 05/05/22  1245   WBC  --  5.1  --  7.6 7.0   HGB  --  11.1*  --  11.1* 11.2*   MCV  --  95  --  98 96   PLT  --  149*  --  138* 146*   INR  --   --  1.10 1.11  --    NA  --   --  143 143  --    POTASSIUM  --   --  3.6 3.6  --    CHLORIDE  --   --  109 106  --    CO2  --   --  29 27  --    BUN  --   --  8 12  --    CR  --   --  0.64* 0.61*  0.61*  --    ANIONGAP  --   --  5 10  --    SILAS  --   --  8.6 8.6  --    *  --  110* 84  --    ALBUMIN  --   --  3.3* 3.5  --    PROTTOTAL  --   --  6.7* 6.8  --    BILITOTAL  --   --  0.6 0.7  --    ALKPHOS  --   --  58 61  --    ALT  --   --  20 23  --    AST  --   --  12 17  --    LIPASE  --   --   --  38*  --

## 2022-05-08 NOTE — CONSULTS
"Hematology consult note    Reasons for Consult: -History of right subclavian, innominate, IJ DVT, need for ongoing anticoagulation    History of Present Illness: Mr. Acevedo is a 51-year-old man admitted for several days of fever and staph epi bacteremia.  He had a right subclavian, IJ, innominate deep vein thrombosis (DVT) on 2/23/2022.  He has been on enoxaparin because he has had a Celestino-en-Y gastric bypass surgery complicated by short gut syndrome.  He was originally on 80 mg twice daily, but then switched to 120 mg daily.  He reports that initially he had a lot of of neck swelling and arm swelling with the DVT, now it is mainly his forearm and hand.  It is not painful, but his rings are tight.  He gets some bruising from the enoxaparin injection sites.  He has not had any problems with nosebleeds, melena, hematochezia, hematuria.    He reports that the right subclavian is probably going to be pulled and after a few days a different line put in.    Past Medical History:  - Status post Celestino-en-Y gastric bypass surgery, with short bowel syndrome.  - History of right subclavian DVT 2/23/2022- central venous catheter related  -Repeated central venous catheter related infections, both Cm and PICC lines  -Chronic anemia  - Anxiety, ADHD  - History of gastric ulcer  - History of head injury    Medications:  - Reviewed, medications include enoxaparin 120 mg subcu daily    Family History: mother-Crohn's disease, Graves' disease, otherwise noncontributory.    Social History: smoking- current smoker, about 3 cigarettes a day.  Alcohol- no, quit drinking 2002.  .    Review of systems:  As in HPI.  He eats a little bit of food, but it often causes abdominal discomfort or diarrhea.  Mainly has the TPN.  The rest of the > 10 point review of systems was negative.        PHYSICAL EXAMINATION:  /83 (BP Location: Left arm)   Pulse 66   Temp 98.6  F (37  C) (Temporal)   Resp 16   Ht 1.803 m (5' 11\")   Wt 82 " kg (180 lb 11.2 oz)   SpO2 98%   BMI 25.20 kg/m      General appearance:  Patient is 51 year old man in no acute distress.     HEENT:  No pallor, icterus, or mucositis.  Neck does not appear to be swollen  Lungs:  Clear to auscultation bilaterally.   Heart:  Regular rate and rhythm; no S3 S4 or murmer.     Abdomen: Significant loose adipose tissue.  A few small lumps at injection sites.  Positive bowel sounds, soft and nontender, nondistended.  No hepatomegaly. No splenomegaly appreciated.    Extremities: Right forearm and hand has 1+ nonpitting edema compared to left.  Trace bilateral ankle edema.     Skin:  No rash, no petechiae or ecchymoses.      Labs:   Latest Reference Range & Units 05/08/22 10:03   WBC 4.0 - 11.0 10e3/uL 5.1   Hemoglobin 13.3 - 17.7 g/dL 11.1 (L)   Hematocrit 40.0 - 53.0 % 34.7 (L)   Platelet Count 150 - 450 10e3/uL 149 (L)   RBC Count 4.40 - 5.90 10e6/uL 3.64 (L)   MCV 78 - 100 fL 95   MCH 26.5 - 33.0 pg 30.5   MCHC 31.5 - 36.5 g/dL 32.0   RDW 10.0 - 15.0 % 14.1   % Neutrophils % 61   % Lymphocytes % 21   % Monocytes % 15   % Eosinophils % 3   % Basophils % 0   Absolute Basophils 0.0 - 0.2 10e3/uL 0.0   Absolute Eosinophils 0.0 - 0.7 10e3/uL 0.1   Absolute Immature Granulocytes <=0.4 10e3/uL 0.0   Absolute Lymphocytes 0.8 - 5.3 10e3/uL 1.1   Absolute Monocytes 0.0 - 1.3 10e3/uL 0.8   % Immature Granulocytes % 0   Absolute Neutrophils 1.6 - 8.3 10e3/uL 3.1   Absolute NRBCs 10e3/uL 0.0   NRBCs per 100 WBC <1 /100 0   (L): Data is abnormally low      Imaging:      EXAMINATION: DOPPLER VENOUS ULTRASOUND OF THE RIGHT UPPER EXTREMITY,  5/7/2022 2:01 PM      COMPARISON: Ultrasound 2/25/2022     HISTORY: swelling hx of dvt has right azevedo also     TECHNIQUE:  Gray-scale evaluation with compression, spectral flow and  color Doppler assessment of the deep venous system of the right upper  extremity.     FINDINGS:  Right: Normal blood flow and waveforms are demonstrated in the  internal jugular,  innominate and axillary veins. There is hypoechoic  thrombus visualized within the median subclavian vein. There is normal  compressibility of the brachial, basilic and cephalic veins.                                                                   IMPRESSION:     1. Partially occlusive thrombus in the right subclavian vein.  2. Previously seen thrombus in the right internal jugular and  innominate vein is not seen on today's exam, compared to prior  ultrasound 2/25/2022.     I have personally reviewed the examination and initial interpretation  and I agree with the findings.     MANUEL CALDWELL MD        Assessment and Recommendation: -  1. Line associated right subclavian DVT- he has been on anticoagulation for little over 2 months.  The amount of thrombosis has improved, but is not totally gone.  This is not too surprising, and he may end up with some chronic thrombus in spite of anticoagulation.  He is currently infected and has been fully anticoagulated for about 2-1/2 months.  Unfortunately, he has ongoing infection, which places him at risk for recurrence, and he will still probably be on antibiotics at the 3-month point.  It seems reasonable to treat this DVT for 6 months.  The enoxaparin at 120 mg subcu daily is standard dosing and adequate, and easier to manage than the twice daily dosing.    -Continue enoxaparin 120 mg subcutaneous daily to complete 6-month course, which would be~August 25, 2022    2.  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  I recommend having lymphedema see him to obtain a graduated compression sleeve.    3.  Mild anemia, thrombocytopenia- he is not iron deficient, he is on B12 supplementation.  This is likely due to suppression from infection and chronic inflammation.  No further evaluation or intervention needed at this time.    Paula Piper MD  Hematology

## 2022-05-08 NOTE — PLAN OF CARE
Goal Outcome Evaluation:    Plan of Care Reviewed With: patient     Overall Patient Progress: no change    Arrived from ED at 2100.  Alert, oriented, AVSS, wife present. Up ad vitor in room and halls.  Admitted with bacteremia and positive blood cultures from central azevedo line.  Dressing change done on that line, per MD ok to use it. + blood return. On IV abx.  Pt refused TPN, not wanting it to go into infected line, wants to talk to team this morning about it.  On reg diet, recent 30 lb weight loss, not much PO and states he doesn't absorb much that he eats. Prefers liquid meds and ones that dissolve easily.  Nausea managed with zofran and carafate. Gtube to gravity or clamped per pt preference, small amt green/brown output.  No void or BM overnight.  Lovenox for known subclavian DVT.  MRSA nares pending.

## 2022-05-08 NOTE — PLAN OF CARE
VSS. Up ad vitor, pt reported he went outside several times today. Pain tolerable with Fentanyl patch and PRN Oxycodone. Took PRN Zofran x1. Pt clamping/unclamping and emptying G tube independently. Reg diet, poor appetite. Plan to start PPN tonight. New PIV placed this afternoon. Getting IV Abx. IR consult placed for Cm removal. Echo ordered, not completed yet. Continue to monitor and with POC.

## 2022-05-08 NOTE — PROVIDER NOTIFICATION
Paged Dez on-call. Pt does not want TPN d/t infected central line. Ok with abx into central line. Wants to talk to primary team this morning about this.

## 2022-05-08 NOTE — PROVIDER NOTIFICATION
Jose Eduardo Garces on-call re: blood culture results.    #1- drawn 5/7 at 224 PM, central venous line, G+ cocci in clusters  #2- drawn 5/7 at 228 pm, central venous line, G+ cocci in clusters    Spoke with on-call MD.  No new orders received, will continue with [facility].

## 2022-05-08 NOTE — PROGRESS NOTES
CLINICAL NUTRITION SERVICES - ASSESSMENT NOTE     Nutrition Prescription    RECOMMENDATIONS FOR MDs/PROVIDERS TO ORDER:  Total fluids per MD     Malnutrition Status:    Patient does not meet two of the established criteria necessary for diagnosing malnutrition    Recommendations already ordered by Registered Dietitian (RD):  Peripheral parenteral nutrition to initiate:  -Recommend only using Peripheral PN for short-term nutrition support, ~1-2 weeks maximum     Use dosing weight 81 kg  -ClinimixE 4.25% AA, 5% dextrose concentration (peripheral line) -Provides 675 mOsm/L (appropriate within limitations of PPN)  -83 mL/hr (1992 mL/day) to provide 100g Dex daily (340 kcal, GIR 0.9 mg/kg/min), 85 g AA daily (340 kcal), and 250 ml 20% IV lipids 3-days per week. This provides 894 kcals (11 kcal/kg/day), 1 g PRO/kg/day, with 24% kcals from Fat.    Future/Additional Recommendations:   Once able to restart TPN via central line, see home regimen below:   Dosing weight: 81 kg   Access: Central Line (IJ)     --Cyclic TPN x 12 hours/evening (2000 to 0800, or per pt preference)  --Run @ rate of 60 mL/hr for 1 hour, 120 mL/hr for 10 hours, and decrease rate to 60 mL/hr x 1 hour for total of 12-hour TPN cycle.      --Total volume ~1320 mL or per phamD with dextrose of 175 g (max GIR= 2.5 mg/kg/min), 100 g AA (1.2 g/kg)   --250 mL 20% Intralipid per home regimen 3x/week  --TPN provides: 175 g dex (595 kcal), 100 g AA (1.2 g/kg) and 250 mL 20% intralipid 3 days per week = 1209 Kcals/day (15  Kcal/kg) and 18% kcal from fat      REASON FOR ASSESSMENT  Parker Acevedo is a/an 51 year old male assessed by the dietitian for Pharmacy/Nutrition to Start and Manage PN (home regimen) --Later modified to PPN consult     CLINICAL HISTORY  Pt with a hx of RNY GB, esophagojejunostomy complicated by short gut syndrome on chronic TPN (supplemental, does take PO but very little), recurrent CLABSI, short-gut syndrome and hx of repeated line-related  "bloody stream infections, admitted with bacteremia and positive blood cultures from central azevedo line.    NUTRITION HISTORY  Reviewed most recent PN regimen per FYI and PharmD.   Home TPN regimen per [facility]:   Dosing weight 81 kg. 3935-6178 mL total volume, 175 gm dextrose (595 Kcals), 100 gm AA (400 Kcals and 1.2 gm/kg), Intralipid 250 mL bag 3 times per week (avg 214 Kcals/day). Total calories 1209 Kcals, 15 Kcal/kg/day. Standard vitamins/trace elements + additional 5 mg Zinc per deficiency. Cycled PN x 12 hours each evening.    Per pt interview:   Stable PN regimen, however last PN infusion was Thursday evening. PO intake chronically low and estimated as \"about 400 calories per day\". Pt reports frequently being placed on PPN during hospital admits d/t recurrent line infections.     CURRENT NUTRITION ORDERS  Diet: Regular  Intake/Tolerance: No intake recorded to flowsheet yet, monitor trends     LABS    Ketones present per urinalysis 5/7; Monitor K/Mg/phos trends with PPN initiation    Zn last checked 3/15/22 (low)   Electrolytes  Potassium (mmol/L)   Date Value   05/08/2022 3.6   05/07/2022 3.6   04/26/2022 3.7   06/29/2021 3.5   06/14/2021 4.0   06/09/2021 3.9     Phosphorus (mg/dL)   Date Value   05/08/2022 3.5   05/07/2022 3.0   04/26/2022 3.6   04/12/2022 2.6   04/06/2022 3.6   06/29/2021 3.2   06/14/2021 4.1   06/09/2021 3.8   06/01/2021 2.8   05/25/2021 3.4    Blood Glucose  Glucose (mg/dL)   Date Value   05/08/2022 110 (H)   05/07/2022 84   04/26/2022 79   04/12/2022 82   04/06/2022 82   06/29/2021 96   06/14/2021 80   06/09/2021 93   06/01/2021 82   05/25/2021 108 (H)     Hemoglobin A1C POCT (%)   Date Value   07/23/2013 4.9   04/09/2013 5.3    Inflammatory Markers  CRP Inflammation (mg/L)   Date Value   05/07/2022 34.0 (H)   03/15/2022 <2.9   02/23/2022 <2.9   06/29/2021 <2.9   06/14/2021 <2.9   06/09/2021 <2.9     WBC (10e9/L)   Date Value   06/29/2021 6.9   06/14/2021 8.1   06/09/2021 7.5     WBC Count " "(10e3/uL)   Date Value   05/07/2022 7.6   05/05/2022 7.0   04/26/2022 6.6     Albumin (g/dL)   Date Value   05/08/2022 3.3 (L)   05/07/2022 3.5   04/26/2022 3.3 (L)   06/29/2021 3.6   06/14/2021 3.6   06/09/2021 3.7      Magnesium (mg/dL)   Date Value   05/08/2022 2.0   05/07/2022 2.0   04/26/2022 2.2   06/29/2021 2.2   06/14/2021 2.1   06/09/2021 2.3     Sodium (mmol/L)   Date Value   05/08/2022 143   05/07/2022 143   04/26/2022 144   06/29/2021 143   06/14/2021 140   06/09/2021 141    Renal  Urea Nitrogen (mg/dL)   Date Value   05/08/2022 8   05/07/2022 12   04/26/2022 8   06/29/2021 11   06/14/2021 17   06/09/2021 15     Creatinine (mg/dL)   Date Value   05/08/2022 0.64 (L)   05/07/2022 0.61 (L)   05/07/2022 0.61 (L)   06/29/2021 0.69   06/14/2021 0.82   06/09/2021 0.76     Additional  Triglycerides (mg/dL)   Date Value   04/26/2022 144   04/12/2022 77   04/06/2022 54   04/13/2021 71   03/29/2021 129   03/25/2021 70     Ketones Urine (mg/dL)   Date Value   05/07/2022 60  (A)   03/19/2021 60 (A)        MEDICATIONS    B12 injection, 1000 mg every 30 days (start date 6/2/22 but in MAR)     Vit D2, 50,000 units/week        ANTHROPOMETRICS  Height: 180.3 cm (5' 11\")  Admit wt 81.6 kg (179 lbs) 5/7/22  Most Recent Weight: 82 kg (180 lb 11.2 oz)  IBW: 78.2 kg  104%IBW   BMI: Overweight BMI 25-29.9    Weight History:   UBW range ~81-92 kg, pending fluid status per pt report.   Wt Readings from Last 15 Encounters:   05/07/22 82 kg (180 lb 11.2 oz)   04/13/22 86.2 kg (190 lb)   03/11/22 83.5 kg (184 lb)   02/27/22 85 kg (187 lb 6.4 oz)   02/23/22 86.2 kg (190 lb 1.6 oz)   01/11/22 87.8 kg (193 lb 9.6 oz)   10/14/21 87 kg (191 lb 12.8 oz)   07/14/21 91.6 kg (202 lb)   07/08/21 92 kg (202 lb 14.4 oz)   05/12/21 79.4 kg (175 lb 1.6 oz)   05/07/21 79.4 kg (175 lb)   03/23/21 87.1 kg (192 lb)   02/24/21 85.6 kg (188 lb 11.4 oz)   02/22/21 84.4 kg (186 lb)   02/05/21 85.6 kg (188 lb 12.8 oz)       Dosing Weight: 81 kg [reported " UBW and home PN dosing weight]     ASSESSED NUTRITION NEEDS  Estimated Energy Needs: 2121-1392 kcals/day (25 - 30 kcals/kg)  Justification: Maintenance  Estimated Protein Needs:  grams protein/day (1.2 - 1.5 grams of pro/kg)  Justification: LBM preservation  Estimated Fluid Needs: 8600-5352 mL/day (1 mL/kcal)   Justification: Maintenance    PHYSICAL FINDINGS  See malnutrition section below.    MALNUTRITION  % Intake: Decreased intake does not meet criteria -PN stable until 3-nights ago  % Weight Loss: Weight loss does not meet criteria  Subcutaneous Fat Loss: None observed  Muscle Loss: None observed  Fluid Accumulation/Edema: None noted  Malnutrition Diagnosis: Patient does not meet two of the established criteria necessary for diagnosing malnutrition    NUTRITION DIAGNOSIS  Inadequate oral intake related to alteration to GI fxn, short-bowel syndrome as evidenced by chronic SPN to meet majority of nutrition needs.      INTERVENTIONS  Implementation  Nutrition Education: Will be provided if education needs arise   Collaboration with other providers - MD, PharmD   Parenteral Nutrition/IV Fluids - Initiate peripheral    Goals  Total avg nutritional intake to meet a minimum of 11 kcal/kg and 1 g PRO/kg daily (per dosing wt 81 kg).     Monitoring/Evaluation  Progress toward goals will be monitored and evaluated per protocol.  Ari Baca RDN, LD, CNSC   Weekend/Holiday RD pager [phone]

## 2022-05-08 NOTE — PLAN OF CARE
Patient has been educated on potential risks of choosing to leave the unit and that the responsibility for patient well-being will belong to the patient. Pt has been informed that admission to hospital is due to need for medical treatment. Education given to the patient on some of the potential risks included but are not limited to:      - lack of access to nursing intervention      - possible missed appointments with MD, therapies, tests      - possible missed medications, antibiotics, management of IV's    Patient Response: verbalizes understanding    Patient notified staff prior to leaving unit: yes  Coban wrap placed over IV prior to pt leaving unit: pt does not have a PIV

## 2022-05-08 NOTE — CONSULTS
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]  Bacteremia associated with intravascular line, initial encounter (H) [T82.7XXA, R78.81]  Swelling of right upper extremity [M79.89]  Nausea and vomiting, intractability of vomiting not specified, unspecified vomiting type [R11.2]  Consult Question: \"patient with S epi bacteremia w/ azevedo in place, stable at the moment, was instructed to present to ED from outside ID doc, would appreciate assistance in guiding therapy and plan for line\"    ID Assessment & Plan     ASSESSMENT: 51-year-old gentleman with history of bariatric surgery complicated by short gut syndrome with chronic central line required for TPN here with new central line associated bloodstream infection.  Blood cultures from line and periphery are growing oxacillin susceptible staph epidermidis.  He has clinically improved since starting cefazolin yesterday.  Complicating this infection is his right subclavian DVT however seems unlikely that this is infected as well.  Would recommend removal of line and daily blood cultures until clearance before replacing line.  If we find that he is persistently positive we may need to consider this DVT as infected.    RECOMMENDATION:  1.  Continue cefazolin 2 g every 8 hours  2.  IR consultation for tunneled line removal  3.  Daily blood cultures until clearance    The patient's care was discussed with the Attending Physician, Dr. Mendez, Patient and Primary team.    Romel Willis MD  [facility]  Infectious Disease " Fellow  ______________________________________________________________________    Chief Complaint   Line infection    History is obtained from the patient    History of Present Illness   Parker Acevedo is a 51 year old male who well-known to the infectious disease service due to complications of multiple line infections.  He is followed by Dr. Buckner.  Past medical history is significant for bariatric surgery complicated by short gut syndrome.  He is on chronic TPN and has had multiple central line associated bloodstream infections.  He was last admitted in January with a staph epi and staph hominis bacteremia.  I was contacted for positive blood cultures on 5/6 and instructed him to come into the emergency department for evaluation.  He did not come into the emergency department until 5/7.  Reportedly, was having fevers for several days and blood cultures were ordered by his primary infectious disease team.  I was then contacted by the ED provider on 5/7 and he had 3 sets of positive blood cultures 2 from his central line that were all positive with oxacillin susceptible staph epidermidis.  He was then started on cefazolin and admitted.    Today, patient reports feeling better since starting antibiotics.  He has not had a fever since admission but reports ongoing fatigue and loss of energy for the last month.  He believes he had development of an infection around 4/4 when he was febrile for 3days.  His fever resolved but he continued to have fatigue until 5/4 when his fevers restarted.  He was recording 102-103 daily.  He did not recall any other symptoms aside from fatigue and was concerned he may have a bloodstream infection.  He contacted his primary infectious disease doctor and had blood cultures drawn.    ID History per Dr. Buckner's last note 4/14/22:    Summary of Recent Hospitalizations:  1/2022 - CoNS x 2 species  3/2021 - Streptococcus bovis  7/28-8/3/20 - treated for MRSE bactermia secondary to central  venous catheter; repeat dual-lumen Mc replaced  July 2020 - treated for Corynebacterium infection with Vancomycin  11/4/19 - peripheral cultures negative; but initial cultures grew Corneybacterium and Peptostreptococcus which were deemed contaminants.  10/4/19 - Strep, Staph, and Granulicatella adiacens bactermia; Cm replaced on 10/1  5/16/19 - blood draw from PICC grew Rothia mucilaginosa  1/9/19 - Candidemia sepsis due to PICC line infection.    Review of Systems   The 10 point Review of Systems is negative other than noted in the HPI or here.    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     Complication of anesthesia      Difficulty swallowing      Gastric ulcer, unspecified as acute or chronic, without mention of hemorrhage, perforation, or obstruction      Gastro-oesophageal reflux disease      Head injury      Hiatal hernia      Other bladder disorder      Other chronic pain      PONV (postoperative nausea and vomiting)      Severe malnutrition (H)     TPN     Short gut syndrome      Tobacco abuse        Past Surgical History   Past Surgical History:   Procedure Laterality Date     AMPUTATION       APPENDECTOMY       BACK SURGERY  11/3/2014    curve in the spine     BIOPSY LYMPH NODE CERVICAL N/A 2/20/2015    Procedure: BIOPSY LYMPH NODE CERVICAL;  Surgeon: Baron Scanlon MD;  Location:  OR     CHOLECYSTECTOMY       COLONOSCOPY N/A 7/14/2021    Procedure: COLONOSCOPY;  Surgeon: Jimbo Estrada MD;  Location: [facility] OR     COLONOSCOPY N/A 4/13/2022    Procedure: COLONOSCOPY;  Surgeon: Jimbo Estrada MD;  Location: [facility] OR     DISCECTOMY, FUSION CERVICAL ANTERIOR ONE LEVEL, COMBINED N/A  2/15/2017    Procedure: COMBINED DISCECTOMY, FUSION CERVICAL ANTERIOR ONE LEVEL;  Surgeon: Darren Campos MD;  Location: [facility] OR     ENDOSCOPIC INSERTION TUBE GASTROSTOMY  9/9/2013    Procedure: ENDOSCOPIC INSERTION TUBE GASTROSTOMY;;  Surgeon: Francis Vyas MD;  Location: [facility] OR     ENDOSCOPIC ULTRASOUND UPPER GASTROINTESTINAL TRACT (GI)  4/29/2011    Procedure:ENDOSCOPIC ULTRASOUND UPPER GASTROINTESTINAL TRACT (GI); Both Procedures done Conjointly; Surgeon:NEREIDA HOUSER; Location:[facility] OR     ENDOSCOPIC ULTRASOUND UPPER GASTROINTESTINAL TRACT (GI)  9/9/2013    Procedure: ENDOSCOPIC ULTRASOUND UPPER GASTROINTESTINAL TRACT (GI);  Endoscopic Ultrasound Guide Gastrostomy Tube Placement  C-arm;  Surgeon: Noe Lizarraga MD;  Location: [facility] OR     ENDOSCOPIC ULTRASOUND UPPER GASTROINTESTINAL TRACT (GI) N/A 2/24/2021    Procedure: ENDOSCOPIC ULTRASOUND, ESOPHAGOSCOPY / UPPER GASTROINTESTINAL TRACT (GI), esophagastrogastroduodenoscopy;  Surgeon: Berny Bach MD;  Location: [facility] OR     ENDOSCOPY  03/25/11    EGD, [facility]     ENDOSCOPY  08/04/09    Upper Endoscopy, [facility]     ENDOSCOPY  01/05/09    Upper Endoscopy, [facility]     ESOPHAGOSCOPY, GASTROSCOPY, DUODENOSCOPY (EGD), COMBINED  4/20/2011    Procedure:COMBINED ESOPHAGOSCOPY, GASTROSCOPY, DUODENOSCOPY (EGD); Surgeon:FRANCIS VYAS; Location: GI     ESOPHAGOSCOPY, GASTROSCOPY, DUODENOSCOPY (EGD), COMBINED  6/15/2011    Procedure:COMBINED ESOPHAGOSCOPY, GASTROSCOPY, DUODENOSCOPY (EGD); Surgeon:FRANCIS VYAS; Location[facility]     ESOPHAGOSCOPY, GASTROSCOPY, DUODENOSCOPY (EGD), COMBINED  6/12/2013    Procedure: COMBINED ESOPHAGOSCOPY, GASTROSCOPY, DUODENOSCOPY (EGD);;  Surgeon: Francis Vyas MD;  Location: [facility] GI     ESOPHAGOSCOPY, GASTROSCOPY, DUODENOSCOPY (EGD), COMBINED  11/22/2013    Procedure: COMBINED ESOPHAGOSCOPY, GASTROSCOPY, DUODENOSCOPY (EGD);;  Surgeon: Francis Vyas MD;  Location:  [facility]     ESOPHAGOSCOPY, GASTROSCOPY, DUODENOSCOPY (EGD), COMBINED  4/30/2014    Procedure: COMBINED ESOPHAGOSCOPY, GASTROSCOPY, DUODENOSCOPY (EGD);  Surgeon: Francis Vyas MD;  Location:  GI     ESOPHAGOSCOPY, GASTROSCOPY, DUODENOSCOPY (EGD), COMBINED N/A 2/20/2015    Procedure: COMBINED ESOPHAGOSCOPY, GASTROSCOPY, DUODENOSCOPY (EGD), BIOPSY SINGLE OR MULTIPLE;  Surgeon: Baron Scanlon MD;  Location: [facility]     ESOPHAGOSCOPY, GASTROSCOPY, DUODENOSCOPY (EGD), COMBINED N/A 9/30/2015    Procedure: COMBINED ESOPHAGOSCOPY, GASTROSCOPY, DUODENOSCOPY (EGD);  Surgeon: Francis Vyas MD;  Location:  GI     ESOPHAGOSCOPY, GASTROSCOPY, DUODENOSCOPY (EGD), COMBINED N/A 10/3/2019    Procedure: Upper Endoscopy;  Surgeon: Clif Morrow MD;  [facility]: [facility]     GASTRECTOMY  6/22/2012    Procedure: GASTRECTOMY;  Open Approach, Excise Ulcers,Partial Gastrectomy, Esophagojejunostomy, Hiatal Hernia Repair, Extensive Lysis of Adhesions and Esaphagogastrodudenoscopy.;  Surgeon: Francis Vyas MD;  Location: [facility]     GASTROJEJUNOSTOMY  08/26/09    Extensice enterolysis, partial resect. jejunum, part. resect gastric pouch, gastrojejunostomy anastomosis     HC ESOPH/GAS REFLUX TEST W NASAL IMPED ELECTRODE  8/5/2013    Procedure: ESOPHAGEAL IMPEDENCE FUNCTION TEST 1 HOUR OR LESS;  Surgeon: Halie Lang MD;  Location:  GI     HEAD & NECK SURGERY  2/15/2017    C5-C6     HERNIA REPAIR  2006    Umbilical hernia     HERNIORRHAPHY HIATAL  6/22/2012    Procedure: HERNIORRHAPHY HIATAL;;  Surgeon: Francis Vyas MD;  [facility]     HERNIORRHAPHY INGUINAL  11/22/2013    Procedure: HERNIORRHAPHY INGUINAL;;  Surgeon: Francis Vyas MD;  [facility]     INSERT PICC LINE Right 12/19/2019    Procedure: Picc Placement;  Surgeon: Oostra, Per Benton, PA-C;  [facility]     INSERT PICC LINE Right 2/21/2020    Procedure: INSERTION, PICC;  Surgeon: Per Dumont PA-C;  [facility]  OR     INSERT PORT VASCULAR ACCESS Right 12/19/2017    Procedure: INSERT PORT VASCULAR ACCESS;  Right Chest Port Placement ;  Surgeon: Lisandro Alejandro PA-C;  [facility]: [facility] OR     INSERT PORT VASCULAR ACCESS Right 8/2/2018    Procedure: INSERT PORT VASCULAR ACCESS;  Place single lumen tunneled central venous access catheter;  Surgeon: Guy Jamil PA-C;  [facility]     IR CVC TUNNEL PLACEMENT > 5 YRS OF AGE  8/7/2019     IR CVC TUNNEL PLACEMENT > 5 YRS OF AGE  4/14/2020     IR CVC TUNNEL PLACEMENT > 5 YRS OF AGE  8/3/2020     IR CVC TUNNEL PLACEMENT > 5 YRS OF AGE  9/4/2020     IR CVC TUNNEL PLACEMENT > 5 YRS OF AGE  2/5/2021     IR CVC TUNNEL PLACEMENT > 5 YRS OF AGE  3/23/2021     IR CVC TUNNEL PLACEMENT > 5 YRS OF AGE  1/13/2022     IR CVC TUNNEL REMOVAL RIGHT  10/1/2019     IR CVC TUNNEL REMOVAL RIGHT  7/30/2020     IR CVC TUNNEL REMOVAL RIGHT  9/2/2020     IR CVC TUNNEL REMOVAL RIGHT  2/3/2021     IR CVC TUNNEL REMOVAL RIGHT  3/19/2021     IR CVC TUNNEL REMOVAL RIGHT  1/10/2022     IR CVC TUNNEL REVISION RIGHT  5/7/2021     IR FOLLOW UP VISIT OUTPATIENT  8/7/2019     IR PICC PLACEMENT > 5 YRS OF AGE  3/7/2019     IR PICC PLACEMENT > 5 YRS OF AGE  12/19/2019     IR PICC PLACEMENT > 5 YRS OF AGE  2/21/2020     LAPAROTOMY EXPLORATORY  11/22/2013    Procedure: LAPAROTOMY EXPLORATORY;  Exploratory Laparotomy, Upper Endoscopy, Left Inguinal Hernia Repair;  Surgeon: Francis Vyas MD;  Location: [facility]     ORTHOPEDIC SURGERY       PICC INSERTION Right 03/16/2017    5fr DL BioFlo PICC, 42cm (3cm external) in the R medial brachial vein w/ tip in the SVC RA junction.     PICC INSERTION Left 09/23/2017    5fr DL BioFlo PICC, 45cm (1cm external) in the L basilic vein w/ tip in the SVC RA junction.     PICC INSERTION Right 05/16/2019    5Fr - 43cm, Medial brachial vein, low SVC     PICC INSERTION Right 10/02/2019    5Fr - 43cm (2cm external), basilic vein, low SVC     SHAYLEE EN Y BOWEL  2003      SOFT TISSUE SURGERY       THORACIC SURGERY       TONSILLECTOMY       TRANSESOPHAGEAL ECHOCARDIOGRAM INTRAOPERATIVE N/A 1/8/2019    Procedure: TRANSESOPHAGEAL ECHOCARDIOGRAM INTRAOPERATIVE;  Surgeon: GENERIC ANESTHESIA PROVIDER;  Location:  OR     [facility] GASTRIC BYPASS,OBESE<100CM SHAYLEE-EN-Y  2002    lost 300 pounds       Social History   Social History     Tobacco Use     Smoking status: Light Tobacco Smoker     Packs/day: 0.10     Years: 3.00     Pack years: 0.30     Types: Cigarettes     Smokeless tobacco: Never Used     Tobacco comment: 2/4/2021    smokes 3 cigarettes/day   Vaping Use     Vaping Use: Never used   Substance Use Topics     Alcohol use: No     Comment: quit 2002     Drug use: No       Family History   I have reviewed this patient's family history and updated it with pertinent information if needed.  Family History   Problem Relation Age of Onset     Gastrointestinal Disease Mother         Crohns disease     Anxiety Disorder Mother      Thyroid Disease Mother         Grave's disease     Cancer Father         ear cancer-skin cancer/melanoma     Breast Cancer Maternal Grandmother      Macular Degeneration Maternal Grandfather      Anxiety Disorder Sister      Diabetes Maternal Uncle      Breast Cancer Other      Hypertension No family hx of      Hyperlipidemia No family hx of      Cerebrovascular Disease No family hx of      Prostate Cancer No family hx of      Depression No family hx of      Anesthesia Reaction No family hx of      Asthma No family hx of      Osteoporosis No family hx of      Genetic Disorder No family hx of      Obesity No family hx of      Mental Illness No family hx of      Substance Abuse No family hx of      Glaucoma No family hx of        Medications   I have reviewed this patient's current medications  Current Facility-Administered Medications   Medication     acetaminophen (TYLENOL) solution 650 mg    Or     acetaminophen (TYLENOL) Suppository 650 mg     albuterol (PROVENTIL  HFA/VENTOLIN HFA) inhaler     amphetamine-dextroamphetamine (ADDERALL) per tablet 20 mg     carvedilol (COREG) tablet 12.5 mg     ceFAZolin (ANCEF) intermittent infusion 2 g in 100 mL dextrose PRE-MIX     [START ON 6/2/2022] cyanocobalamin injection 1,000 mcg     dextrose 10% infusion     dextrose 10% infusion     enoxaparin ANTICOAGULANT (LOVENOX) injection 120 mg     fentaNYL (DURAGESIC) 25 mcg/hr 72 hr patch 1 patch     fentaNYL (DURAGESIC) Patch in Place     heparin 100 UNIT/ML injection 5-10 mL     heparin lock flush 10 UNIT/ML injection 5-10 mL     heparin lock flush 10 UNIT/ML injection 5-10 mL     lidocaine (LMX4) cream     lidocaine (LMX4) cream     lidocaine 1 % 0.1-1 mL     lidocaine 1 % 0.1-1 mL     [START ON 5/9/2022] lipids (INTRALIPID) 20 % infusion 250 mL     LORazepam (ATIVAN) tablet 1 mg     melatonin tablet 1 mg     ondansetron (ZOFRAN ODT) ODT tab 4 mg     oxyCODONE (ROXICODONE) solution 5-10 mg     pantoprazole (PROTONIX) EC tablet 40 mg     parenteral nutrition - ADULT compounded formula CYCLE     parenteral nutrition - Clinimix E     sodium chloride (PF) 0.9% PF flush 10-20 mL     sodium chloride (PF) 0.9% PF flush 10-20 mL     sodium chloride (PF) 0.9% PF flush 10-20 mL     sodium chloride (PF) 0.9% PF flush 3 mL     sodium chloride (PF) 0.9% PF flush 3 mL     sodium chloride (PF) 0.9% PF flush 3 mL     sodium chloride (PF) 0.9% PF flush 3 mL     sucralfate (CARAFATE) suspension 1 g     vitamin D2 (ERGOCALCIFEROL) 63233 units (1250 mcg) capsule 50,000 Units       Allergies   Allergies   Allergen Reactions     Bactrim [Sulfamethoxazole W/Trimethoprim] Rash     Penicillins Anaphylaxis     Please see Antimicrobial Management Team allergy assessment note 10/10/2018. Patient reported tolerating amoxicillin.     Doxycycline Rash     Vancomycin Rash     Rash after receiving vancomycin 3/28/16 (infusion reaction?). Tolerated with slower infusion and diphenhydramine premed.       Physical Exam   Vital  Signs: Temp: 97.8  F (36.6  C) Temp src: Temporal BP: 120/76 Pulse: 68   Resp: 16 SpO2: 98 % O2 Device: None (Room air)    Weight: 180 lbs 11.2 oz    Constitutional: awake, alert, cooperative, no apparent distress, and appears stated age  Eyes: Lids and lashes normal, pupils equal, round and reactive to light, extra ocular muscles intact, sclera clear, conjunctiva normal  ENT: Normocephalic, without obvious abnormality, atraumatic, external ears without lesions, oral pharynx with moist mucous membranes, tonsils without erythema or exudates, gums normal.  Hematologic / Lymphatic: no cervical lymphadenopathy and no supraclavicular lymphadenopathy  Respiratory: No increased work of breathing, good air exchange, clear to auscultation bilaterally, no crackles or wheezing  Cardiovascular: Normal apical impulse, regular rate and rhythm, normal S1 and S2, no S3 or S4, and no murmur noted.  There is some faint erythema overlying the skin of the right chest wall tunneled line  GI: Soft, nontender, excessive skin, left sided feeding tube vented to a bag with green output  Genitounirinary: Deferred  Skin: no bruising or bleeding, normal skin color, texture, turgor, no redness, warmth, or swelling and no rashes  Musculoskeletal: There is no redness, warmth, or swelling of the joints.  Full range of motion noted.  Motor strength is 5 out of 5 all extremities bilaterally.  Tone is normal.  Neurologic: Awake, alert, oriented to name, place and time.  Cranial nerves II-XII are grossly intact.  Motor is 5 out of 5 bilaterally.    Neuropsychiatric: General: normal, calm and normal eye contact  Affect: normal  Orientation: oriented to self, place, time and situation      Data   Inflammatory Markers   Recent Labs   Lab Test 05/07/22  1423 03/15/22  1442 02/23/22  1621 01/08/22  1430 11/16/21  1300 08/24/21  0855 07/13/21  1110 06/29/21  1016 11/17/20  1445 07/28/20  1607 06/28/19  1345 05/14/19  1145 02/12/18  1400 01/23/18  0845  01/20/18  1030 10/02/17  1030 09/24/17  1900 06/29/17  1009 06/28/17  2222 03/12/17  0351   SED  --   --  10  --   --   --   --   --   --  10  --  13  --  10 11  --  31*  --  26* 17*   CRP 34.0* <2.9 <2.9 52.0* <2.9 <2.9 7.8 <2.9   < > <2.9   < >  --    < > <2.9 5.4   < > 26.6*   < > 53.0* 3.4    < > = values in this interval not displayed.        Hematology Studies   Recent Labs   Lab Test 05/07/22  1423 05/05/22  1245 04/26/22  1310 04/12/22  1030 04/06/22  1245 03/29/22  1120 07/13/21  1110 06/29/21  1016 06/14/21  1017 06/09/21  1044 06/01/21  1117 05/25/21  1147 05/19/21  1342 05/18/21  1015   WBC 7.6 7.0 6.6 5.2 8.2 6.2   < > 6.9 8.1 7.5 7.3   < > 6.1 6.2   ANEU  --   --   --   --   --   --   --  3.1 4.1 4.7 4.3  --  3.5 3.7   AEOS  --   --   --   --   --   --   --  0.2 0.2 0.3 0.1  --  0.2 0.2   HGB 11.1* 11.2* 13.1* 10.3* 10.0* 13.1*   < > 12.1* 12.0* 13.3 12.1*   < > 12.5* 12.6*   MCV 98 96 93 96 97 95   < > 96 97 96 94   < > 97 98   * 146* 242 160 139* 200   < > 178 158 169 174   < > 159 145*    < > = values in this interval not displayed.       Metabolic Studies   Recent Labs   Lab Test 05/08/22  0643 05/07/22  1423 04/26/22  1310 04/12/22  1030 04/06/22  1245    143 144 144 141   POTASSIUM 3.6 3.6 3.7 3.7 3.5   CHLORIDE 109 106 109 113* 108   CO2 29 27 30 28 28   BUN 8 12 8 19 14   CR 0.64* 0.61*  0.61* 0.73 0.64* 0.72   GFRESTIMATED >90 >90  >90 >90 >90 >90       Hepatic Studies    Recent Labs   Lab Test 05/08/22  0643 05/07/22  1423 04/26/22  1310 04/12/22  1030 04/06/22  1245 03/29/22  1120   BILITOTAL 0.6 0.7 0.4  0.4 0.3  0.3 0.5  0.5 0.7  0.7   ALKPHOS 58 61 62 47 48 48   ALBUMIN 3.3* 3.5 3.3* 3.1* 3.2* 3.5   AST 12 17 14 14 14 16   ALT 20 23 36 23 36 24       Most Recent 6 Bacteria Isolates From Any Culture (See EPIC Reports for Culture Details):  Recent Labs   Lab Test 05/28/21  1336 05/28/21  1334 05/28/21  1328 04/02/21  1500 03/22/21  0548 03/22/21  0540   CULT No growth No  growth No growth No growth  No growth  No growth No growth No growth       Urine Studies    Recent Labs   Lab Test 05/07/22  1520 02/25/22  1622 03/19/21  1644 07/28/20  1905 11/03/19  0025 10/04/19  1344   LEUKEST Negative Negative Negative Negative Negative Trace*   WBCU 0  --  1 <1 <1 1       Vancomycin Levels    Recent Labs   Lab Test 08/24/21  0855 03/23/21  0756 03/22/21  1139   VANCOMYCIN 7.2 13.3 10.6       Hepatitis B Testing   Recent Labs   Lab Test 09/20/17  0549   HEPBANG Nonreactive     Hepatitis C Testing     Hepatitis C Antibody   Date Value Ref Range Status   09/20/2017 Nonreactive NR^Nonreactive Final     Comment:     Assay performance characteristics have not been established for newborns,   infants, and children       HIVTesting   Recent Labs   Lab Test 10/30/18  0949   HIAGAB Nonreactive       Respiratory Virus Testing    No results found for: RS, FLUAG  COVID-19 Antibody Results, Testing for Immunity    COVID-19 Antibody Results, Testing for Immunity   No data to display.         COVID-19 PCR Results    COVID-19 PCR Results 11/9/20 1/30/21 2/20/21 2/20/21 3/17/21 3/17/21 5/5/21 5/5/21 7/12/21 1/8/22 2/24/22 4/9/22 5/7/22      1058 1058 1144 1144 0944 0944        COVID-19 Virus PCR to [facility] - Result Detected, Abnormal Result (A)  Test received-See reflex to IDDL test SARS CoV2 (COVID-19) Virus RT-PCR  Test received-See reflex to IDDL test SARS CoV2 (COVID-19) Virus RT-PCR  Test received-See reflex to IDDL test SARS CoV2 (COVID-19) Virus RT-PCR         COVID-19 Virus PCR to [facility] - Source Nasopharyngeal  Nasopharyngeal  Nasopharyngeal  Nasopharyngeal         SARS-CoV-2 Virus Specimen Source    Nasopharyngeal  Nasopharyngeal  Nasopharyngeal        Flu A/B & SARS-COV-2 PCR Source  Nasopharyngeal              SARS-CoV-2 PCR Result  NEGATIVE  NEGATIVE  NEGATIVE  NEGATIVE        SARS CoV2 PCR         Negative Negative Negative Negative Negative   (A) Abnormal value       Comments are available  for some flowsheets but are not being displayed.

## 2022-05-08 NOTE — CONSULTS
Care Management Initial Consult    General Information  Assessment completed with: VM-chart review(unable to reach patient via phone)   Type of CM/SW Visit: Initial Assessment    Primary Care Provider verified and updated as needed:     Readmission within the last 30 days: no previous admission in last 30 days      Reason for Consult: discharge planning  Advance Care Planning: Advance Care Planning Reviewed: no concerns identified          Communication Assessment  Patient's communication style: spoken language (English or Bilingual)    Hearing Difficulty or Deaf: no   Wear Glasses or Blind: no    Cognitive  Cognitive/Neuro/Behavioral: WDL                      Living Environment:   People in home: child(francheska), dependent, spouse     Current living Arrangements: house      Able to return to prior arrangements: yes       Family/Social Support:  Care provided by: self, homecare agency  Provides care for: no one  Marital Status:   Wife          Description of Support System: Supportive, Involved    Support Assessment: Adequate family and caregiver support, Adequate social supports    Current Resources:   Patient receiving home care services: Yes  Skilled Home Care Services: Skilled Nursing  Community Resources: Infusion Services  Equipment currently used at home: none  Supplies currently used at home: Other (TPN supplies)    Employment/Financial:  Employment Status: disabled        Financial Concerns: No concerns identified           Lifestyle & Psychosocial Needs:  Social Determinants of Health     Tobacco Use: High Risk     Smoking Tobacco Use: Light Tobacco Smoker     Smokeless Tobacco Use: Never Used   Alcohol Use: Not At Risk     Frequency of Alcohol Consumption: Never     Average Number of Drinks: Patient refused     Frequency of Binge Drinking: Never   Financial Resource Strain: Not on file   Food Insecurity: Not on file   Transportation Needs: Not on file   Physical Activity: Not on file   Stress: Not on  file   Social Connections: Not on file   Intimate Partner Violence: Not At Risk     Fear of Current or Ex-Partner: No     Emotionally Abused: No     Physically Abused: No     Sexually Abused: No   Depression: Not at risk     PHQ-2 Score: 0   Housing Stability: Not on file       Functional Status:  Prior to admission patient needed assistance:   Dependent ADLs:: Independent  Dependent IADLs:: Independent  Assesssment of Functional Status: At functional baseline    Mental Health Status:  Mental Health Status: No Current Concerns       Chemical Dependency Status:  Chemical Dependency Status: No Current Concerns             Values/Beliefs:  Spiritual, Cultural Beliefs, Oriental orthodox Practices, Values that affect care: no               Additional Information:  Patient is a 51 year old male with hx of bariatric surgery (RYGB, esophagojejunostomy complicated by short gut syndrome) on chronic TPN, recurrent CLABSI, short-gut syndrome and hx of repeated line-releated bloody stream infections and is admitted for concern for staph epi bacteremia. Per chart review pt is open to [facility] for home RN and [facility] for TPN as well as IVF's.  Pt has g-tube for venting. Unable to reach pt via phone to complete initial assessment so completed via chart review.  Updated [facility] regarding pt's admission and verified pt has [facility] for home nursing.  Resumption orders placed.  CM team will continue to follow and assist with discharge planning as needed.        [facility] (chronic TPN)  Phone  [phone]  Fax  [phone]     [facility] (RN)  Phone: [phone]  Fax: [phone]    [facility]  Phone: [phone]  Pager: [phone]  To contact the weekend RNCC  [place] (0800 - 1630) Saturday and Sunday    Units: 4A, 4C, 4E, 5A and 5B- Pager 1: [phone]    Units: 6A, 6B, 6C, 6D- Pager 2: [phone]    Units: 7A, 7B, 7C, 7D, and 5C-Pager 3: [phone]

## 2022-05-09 ENCOUNTER — APPOINTMENT (OUTPATIENT)
Dept: ULTRASOUND IMAGING | Facility: CLINIC | Age: 52
DRG: 315 | End: 2022-05-09
Attending: NURSE PRACTITIONER
Payer: COMMERCIAL

## 2022-05-09 ENCOUNTER — APPOINTMENT (OUTPATIENT)
Dept: INTERVENTIONAL RADIOLOGY/VASCULAR | Facility: CLINIC | Age: 52
DRG: 315 | End: 2022-05-09
Attending: NURSE PRACTITIONER
Payer: COMMERCIAL

## 2022-05-09 LAB
ALBUMIN SERPL-MCNC: 3.2 G/DL (ref 3.4–5)
ALP SERPL-CCNC: 58 U/L (ref 40–150)
ALT SERPL W P-5'-P-CCNC: 17 U/L (ref 0–70)
ANION GAP SERPL CALCULATED.3IONS-SCNC: 5 MMOL/L (ref 3–14)
AST SERPL W P-5'-P-CCNC: 10 U/L (ref 0–45)
BILIRUB DIRECT SERPL-MCNC: <0.1 MG/DL (ref 0–0.2)
BILIRUB SERPL-MCNC: 0.4 MG/DL (ref 0.2–1.3)
BUN SERPL-MCNC: 10 MG/DL (ref 7–30)
CALCIUM SERPL-MCNC: 8.7 MG/DL (ref 8.5–10.1)
CHLORIDE BLD-SCNC: 109 MMOL/L (ref 94–109)
CO2 SERPL-SCNC: 29 MMOL/L (ref 20–32)
CREAT SERPL-MCNC: 0.62 MG/DL (ref 0.66–1.25)
GFR SERPL CREATININE-BSD FRML MDRD: >90 ML/MIN/1.73M2
GLUCOSE BLD-MCNC: 92 MG/DL (ref 70–99)
GLUCOSE BLDC GLUCOMTR-MCNC: 105 MG/DL (ref 70–99)
GLUCOSE BLDC GLUCOMTR-MCNC: 105 MG/DL (ref 70–99)
GLUCOSE BLDC GLUCOMTR-MCNC: 124 MG/DL (ref 70–99)
GLUCOSE BLDC GLUCOMTR-MCNC: 86 MG/DL (ref 70–99)
INR PPP: 1.02 (ref 0.85–1.15)
MAGNESIUM SERPL-MCNC: 2.2 MG/DL (ref 1.6–2.3)
PHOSPHATE SERPL-MCNC: 3.6 MG/DL (ref 2.5–4.5)
POTASSIUM BLD-SCNC: 3.6 MMOL/L (ref 3.4–5.3)
PREALB SERPL IA-MCNC: 12 MG/DL (ref 15–45)
PREALB SERPL IA-MCNC: 12 MG/DL (ref 15–45)
PROT SERPL-MCNC: 6.4 G/DL (ref 6.8–8.8)
SODIUM SERPL-SCNC: 143 MMOL/L (ref 133–144)

## 2022-05-09 PROCEDURE — 87040 BLOOD CULTURE FOR BACTERIA: CPT | Performed by: STUDENT IN AN ORGANIZED HEALTH CARE EDUCATION/TRAINING PROGRAM

## 2022-05-09 PROCEDURE — 99233 SBSQ HOSP IP/OBS HIGH 50: CPT | Mod: 24 | Performed by: INTERNAL MEDICINE

## 2022-05-09 PROCEDURE — 99233 SBSQ HOSP IP/OBS HIGH 50: CPT | Mod: GC | Performed by: STUDENT IN AN ORGANIZED HEALTH CARE EDUCATION/TRAINING PROGRAM

## 2022-05-09 PROCEDURE — 120N000002 HC R&B MED SURG/OB UMMC

## 2022-05-09 PROCEDURE — 0JPTXXZ REMOVAL OF TUNNELED VASCULAR ACCESS DEVICE FROM TRUNK SUBCUTANEOUS TISSUE AND FASCIA, EXTERNAL APPROACH: ICD-10-PCS | Performed by: PHYSICIAN ASSISTANT

## 2022-05-09 PROCEDURE — 82248 BILIRUBIN DIRECT: CPT | Performed by: STUDENT IN AN ORGANIZED HEALTH CARE EDUCATION/TRAINING PROGRAM

## 2022-05-09 PROCEDURE — 250N000013 HC RX MED GY IP 250 OP 250 PS 637: Performed by: HOSPITALIST

## 2022-05-09 PROCEDURE — 84100 ASSAY OF PHOSPHORUS: CPT | Performed by: INTERNAL MEDICINE

## 2022-05-09 PROCEDURE — 250N000011 HC RX IP 250 OP 636: Performed by: STUDENT IN AN ORGANIZED HEALTH CARE EDUCATION/TRAINING PROGRAM

## 2022-05-09 PROCEDURE — 250N000013 HC RX MED GY IP 250 OP 250 PS 637

## 2022-05-09 PROCEDURE — 250N000011 HC RX IP 250 OP 636

## 2022-05-09 PROCEDURE — 36589 REMOVAL TUNNELED CV CATH: CPT | Performed by: PHYSICIAN ASSISTANT

## 2022-05-09 PROCEDURE — 36592 COLLECT BLOOD FROM PICC: CPT | Performed by: STUDENT IN AN ORGANIZED HEALTH CARE EDUCATION/TRAINING PROGRAM

## 2022-05-09 PROCEDURE — 250N000009 HC RX 250: Performed by: PHYSICIAN ASSISTANT

## 2022-05-09 PROCEDURE — 93971 EXTREMITY STUDY: CPT | Mod: LT,XS

## 2022-05-09 PROCEDURE — 02PYX3Z REMOVAL OF INFUSION DEVICE FROM GREAT VESSEL, EXTERNAL APPROACH: ICD-10-PCS | Performed by: PHYSICIAN ASSISTANT

## 2022-05-09 PROCEDURE — 83735 ASSAY OF MAGNESIUM: CPT | Performed by: INTERNAL MEDICINE

## 2022-05-09 PROCEDURE — 84134 ASSAY OF PREALBUMIN: CPT | Performed by: INTERNAL MEDICINE

## 2022-05-09 PROCEDURE — 36589 REMOVAL TUNNELED CV CATH: CPT

## 2022-05-09 PROCEDURE — 250N000013 HC RX MED GY IP 250 OP 250 PS 637: Performed by: STUDENT IN AN ORGANIZED HEALTH CARE EDUCATION/TRAINING PROGRAM

## 2022-05-09 PROCEDURE — 93971 EXTREMITY STUDY: CPT | Mod: 26 | Performed by: STUDENT IN AN ORGANIZED HEALTH CARE EDUCATION/TRAINING PROGRAM

## 2022-05-09 PROCEDURE — 80053 COMPREHEN METABOLIC PANEL: CPT | Performed by: STUDENT IN AN ORGANIZED HEALTH CARE EDUCATION/TRAINING PROGRAM

## 2022-05-09 PROCEDURE — 93971 EXTREMITY STUDY: CPT | Mod: RT

## 2022-05-09 PROCEDURE — 85610 PROTHROMBIN TIME: CPT | Performed by: INTERNAL MEDICINE

## 2022-05-09 PROCEDURE — 93971 EXTREMITY STUDY: CPT | Mod: LT

## 2022-05-09 PROCEDURE — 99207 PR CDG-CUT & PASTE-POTENTIAL IMPACT ON LEVEL: CPT | Performed by: STUDENT IN AN ORGANIZED HEALTH CARE EDUCATION/TRAINING PROGRAM

## 2022-05-09 PROCEDURE — 250N000009 HC RX 250: Performed by: STUDENT IN AN ORGANIZED HEALTH CARE EDUCATION/TRAINING PROGRAM

## 2022-05-09 RX ORDER — LIDOCAINE HYDROCHLORIDE 10 MG/ML
1-30 INJECTION, SOLUTION EPIDURAL; INFILTRATION; INTRACAUDAL; PERINEURAL
Status: COMPLETED | OUTPATIENT
Start: 2022-05-09 | End: 2022-05-09

## 2022-05-09 RX ORDER — NICOTINE 21 MG/24HR
1 PATCH, TRANSDERMAL 24 HOURS TRANSDERMAL DAILY
Status: DISCONTINUED | OUTPATIENT
Start: 2022-05-09 | End: 2022-05-12 | Stop reason: HOSPADM

## 2022-05-09 RX ADMIN — NICOTINE 1 PATCH: 21 PATCH, EXTENDED RELEASE TRANSDERMAL at 08:17

## 2022-05-09 RX ADMIN — CARVEDILOL 12.5 MG: 12.5 TABLET, FILM COATED ORAL at 18:23

## 2022-05-09 RX ADMIN — CEFAZOLIN SODIUM 2 G: 2 INJECTION, SOLUTION INTRAVENOUS at 06:45

## 2022-05-09 RX ADMIN — CEFAZOLIN SODIUM 2 G: 2 INJECTION, SOLUTION INTRAVENOUS at 14:51

## 2022-05-09 RX ADMIN — OXYCODONE HYDROCHLORIDE 10 MG: 5 SOLUTION ORAL at 03:33

## 2022-05-09 RX ADMIN — LIDOCAINE HYDROCHLORIDE 4 ML: 10 INJECTION, SOLUTION EPIDURAL; INFILTRATION; INTRACAUDAL; PERINEURAL at 14:06

## 2022-05-09 RX ADMIN — ONDANSETRON 4 MG: 4 TABLET, ORALLY DISINTEGRATING ORAL at 21:35

## 2022-05-09 RX ADMIN — ONDANSETRON 4 MG: 4 TABLET, ORALLY DISINTEGRATING ORAL at 11:16

## 2022-05-09 RX ADMIN — Medication 5 ML: at 08:21

## 2022-05-09 RX ADMIN — OXYCODONE HYDROCHLORIDE 10 MG: 5 SOLUTION ORAL at 11:16

## 2022-05-09 RX ADMIN — PANTOPRAZOLE SODIUM 40 MG: 40 TABLET, DELAYED RELEASE ORAL at 08:17

## 2022-05-09 RX ADMIN — ONDANSETRON 4 MG: 4 TABLET, ORALLY DISINTEGRATING ORAL at 03:33

## 2022-05-09 RX ADMIN — ENOXAPARIN SODIUM 120 MG: 120 INJECTION SUBCUTANEOUS at 21:36

## 2022-05-09 RX ADMIN — CARVEDILOL 12.5 MG: 12.5 TABLET, FILM COATED ORAL at 08:17

## 2022-05-09 RX ADMIN — DEXTROAMPHETAMINE SACCHARATE, AMPHETAMINE ASPARTATE, DEXTROAMPHETAMINE SULFATE AND AMPHETAMINE SULFATE 20 MG: 2.5; 2.5; 2.5; 2.5 TABLET ORAL at 08:16

## 2022-05-09 RX ADMIN — ASCORBIC ACID, VITAMIN A PALMITATE, CHOLECALCIFEROL, THIAMINE HYDROCHLORIDE, RIBOFLAVIN-5 PHOSPHATE SODIUM, PYRIDOXINE HYDROCHLORIDE, NIACINAMIDE, DEXPANTHENOL, ALPHA-TOCOPHEROL ACETATE, VITAMIN K1, FOLIC ACID, BIOTIN, CYANOCOBALAMIN: 200; 3300; 200; 6; 3.6; 6; 40; 15; 10; 150; 600; 60; 5 INJECTION, SOLUTION INTRAVENOUS at 22:09

## 2022-05-09 RX ADMIN — OXYCODONE HYDROCHLORIDE 10 MG: 5 SOLUTION ORAL at 21:35

## 2022-05-09 RX ADMIN — SUCRALFATE 1 G: 1 SUSPENSION ORAL at 06:45

## 2022-05-09 RX ADMIN — LORAZEPAM 1 MG: 1 TABLET ORAL at 21:36

## 2022-05-09 RX ADMIN — CEFAZOLIN SODIUM 2 G: 2 INJECTION, SOLUTION INTRAVENOUS at 21:36

## 2022-05-09 ASSESSMENT — ACTIVITIES OF DAILY LIVING (ADL)
ADLS_ACUITY_SCORE: 5
ADLS_ACUITY_SCORE: 20
ADLS_ACUITY_SCORE: 5
ADLS_ACUITY_SCORE: 20
ADLS_ACUITY_SCORE: 5
ADLS_ACUITY_SCORE: 20
ADLS_ACUITY_SCORE: 5
ADLS_ACUITY_SCORE: 5
ADLS_ACUITY_SCORE: 20
ADLS_ACUITY_SCORE: 5
ADLS_ACUITY_SCORE: 20
ADLS_ACUITY_SCORE: 5
ADLS_ACUITY_SCORE: 5
ADLS_ACUITY_SCORE: 20
ADLS_ACUITY_SCORE: 5
ADLS_ACUITY_SCORE: 5
ADLS_ACUITY_SCORE: 20
ADLS_ACUITY_SCORE: 20
ADLS_ACUITY_SCORE: 5
ADLS_ACUITY_SCORE: 5
ADLS_ACUITY_SCORE: 20

## 2022-05-09 NOTE — PROVIDER NOTIFICATION
Notified MD at 0228 AM regarding vital signs.      Spoke with: no one    Orders were not obtained.    Comments: Pt is refusing VS overnight, does not want to be woken up for them.    Gay Ann, RN on 5/9/2022 at 2:54 AM

## 2022-05-09 NOTE — IR NOTE
Patient Name: Parker Acevedo  Medical Record Number: [id number]  Today's Date: 5/9/2022    Procedure: Removal of tunneled central venous catheter  Proceduralist: Guy Jamil PA-C  Pathology present: NA    Procedure Start: 1405  Procedure end: 1417  Sedation medications administered: Local Lidocaine     Report given to: Kwabena HARRISON  : YOAV    Other Notes: Pt arrived to IR room 6 from . Consent reviewed. Pt denies any questions or concerns regarding procedure. Pt positioned supine and monitored per protocol. Pt tolerated procedure without any noted complications. Pt transferred back to .

## 2022-05-09 NOTE — CONSULTS
"    Interventional Radiology Consult Service Note    Patient is on IR schedule 5/9 for a RIJ TCVC removal.   Labs WNL for procedure. COVID neg.    No NPO required.  Consent will be done prior to procedure.     Please contact the IR charge RN at [phone] for estimated time of procedure.     Case discussed with Dr. Church from IR and Dr. Nicole. This is a 51 year old male with a history of bariatric surgery (RYGB, esophagojejunostomy complicated by short gut syndrome) on chronic TPN, with hx of repeated line-releated blood stream infections asked to present to the ED by outpatient ID provider due to positive blood cultures. Reportedly patient complained of malaise and fevers for multiple days and had blood cultures drawn 5/5 as an OP. 2/3 blood cultures were positive. Presented to the ED 5/7 for admission and management of line associated infection. IR is consulted for RIJ TCVC removal for line holiday. This particular catheter was placed 1/13/22 after a line holiday for infection. Pt subsequently had RUE DVT requiring AC. US from 5/9 shows no UE DVT. IR will proceed with RIJ TCVC removal.     Expected date of discharge: TBD    Vitals:   /72 (BP Location: Left arm)   Pulse 67   Temp 98.9  F (37.2  C) (Oral)   Resp 16   Ht 1.803 m (5' 11\")   Wt 81.1 kg (178 lb 14.4 oz)   SpO2 100%   BMI 24.95 kg/m      Pertinent Labs:     Lab Results   Component Value Date    WBC 5.1 05/08/2022    WBC 7.6 05/07/2022    WBC 7.0 05/05/2022    WBC 6.9 06/29/2021    WBC 8.1 06/14/2021    WBC 7.5 06/09/2021       Lab Results   Component Value Date    HGB 11.1 05/08/2022    HGB 11.1 05/07/2022    HGB 11.2 05/05/2022    HGB 12.1 06/29/2021    HGB 12.0 06/14/2021    HGB 13.3 06/09/2021       Lab Results   Component Value Date     05/08/2022     05/07/2022     05/05/2022     06/29/2021     06/14/2021     06/09/2021       Lab Results   Component Value Date    INR 1.02 05/09/2022    INR 1.07 " 05/07/2021    PTT 35 05/07/2022    PTT 26 07/22/2019       Lab Results   Component Value Date    POTASSIUM 3.6 05/09/2022    POTASSIUM 3.5 06/29/2021        SAILAJA Cordova CNP  Interventional Radiology  Pager: [phone]

## 2022-05-09 NOTE — PLAN OF CARE
Patient has been educated on potential risks of choosing to leave the unit and that the responsibility for patient well-being will belong to the patient. Pt has been informed that admission to hospital is due to need for medical treatment. Education given to the patient on some of the potential risks included but are not limited to:      - lack of access to nursing intervention      - possible missed appointments with MD, therapies, tests      - possible missed medications, antibiotics, management of IV's     Patient Response: verbalizes understanding     Patient notified staff prior to leaving unit: no  Coban wrap placed over IV prior to pt leaving unit: no, patient left the floor without telling nurse.    Gay Ann, RN on 5/8/2022 at 8:58 PM

## 2022-05-09 NOTE — PLAN OF CARE
"Pt Aox4, VSS, RA. Pain is chronic and pt states is 5-6/10 w/ regiment but doesn't complain of it, nor does it effect his ADLs, pain meds were available at 1716 but patient decided he would want until 2200. Fentanyl patch to Lt upper arm, Nicotine patch applied to Rt scapula. PPN running to LUE. Cm line dressing applied this AM, when found to be SRI upon first assessment in AM, removed in earlier afternoon by IR. Pt refused CHG bath. PO intake and appetite pretty poor, mostly given via PPN. Pt goes on walks to outside and around the hospital but tells nurses when going, or leaves a note. Pt have BUE US today, still needs Echocardiogram. /82 (BP Location: Left arm)   Pulse 68   Temp 99.1  F (37.3  C) (Temporal)   Resp 18   Ht 1.803 m (5' 11\")   Wt 81.1 kg (178 lb 14.4 oz)   SpO2 97%   BMI 24.95 kg/m      "

## 2022-05-09 NOTE — PROCEDURES
Parker Acevedo  [id number]    Completed removal of dual lumen tunneled central venous access catheter via RIGHT chest. Dx: infection. Omero.  LOCAL

## 2022-05-09 NOTE — PROGRESS NOTES
General ID [facility] Service: Follow-up Note      Patient:  Parker Acevedo, Date of birth 1970, Medical record number [id number]  Date of Visit:  May 9, 2022  Reason for consult: CLABSI         Assessment and Recommendations:     ID Problem list:  1. CLABSI with Staph epidermidis (MSSE) bacteremia  2. Fevers/chills  3. History of short gut syndrome, on TPN  4. History of prior CLABSI-related bloodstream infections  5. Right subclavian vein thrombus    Recs:  1. Agree with IV cefazolin 2g q8hr  2. Agree with CVC removal for line holiday  3. Repeat blood cultures until clearance; will follow up pending cultures (currently all positive through 5/8 with cultures from today pending)      Discussion:  Parker is a 51M with history of bariatric surgery c/b short gut syndrome requiring TPN, and history of prior CLABSI-related bloodstream infections (follows in ID clinic with Dr. Buckner), who was admitted 5/7 for fevers/chills at home and found to have MSSE bacteremia. To date, all blood cultures from 5/5-5/8 are positive for Staph epidermidis, and repeat blood cultures from today (5/9) currently pending. He is going for CVC removal today by IR. Would continue to check daily blood cultures until clearance. Would recommend line holiday until blood cultures clear.       Thank you for allowing us to participate in the care of this patient. ID will continue to follow.     Attestation:  Koko Gustafson MD  Infectious Diseases     05/09/2022            Interval History:     Patient reports mild improvement in his fevers/chills and fatigue. He says his abdominal pain, nausea bloating, and loose stool are chronic and unchanged. He says his right chest CVC did have erythema prior to arrival (as per his wife), and it was itchy with occasional transient stabbing pain prior to admission, but no purulence at the site.          Review of Systems:   8 point ROS obtained, pertinent positives and negatives as above.          Current  Antimicrobials   IV cefazolin         Physical Exam:   Ranges for vital signs:  Temp:  [97  F (36.1  C)-99.5  F (37.5  C)] 98.9  F (37.2  C)  Pulse:  [50-77] 75  Resp:  [16-18] 16  BP: (114-134)/(69-88) 121/76  SpO2:  [97 %-100 %] 99 %    Intake/Output Summary (Last 24 hours) at 5/9/2022 1411  Last data filed at 5/9/2022 1004  Gross per 24 hour   Intake 841.8 ml   Output 0 ml   Net 841.8 ml     Exam:  GENERAL:  Sitting up in bed in no acute distress.   ENT:  Head is normocephalic, atraumatic. Oropharynx is moist.  EYES:  Eyes have anicteric sclerae.    LUNGS:  Unlabored breathing. Clear to auscultation.  CARDIOVASCULAR:  Regular rate and rhythm, no murmur noted.   ABDOMEN:  Non-distended, soft, nontender.  EXT: Extremities warm and well perfused.  RUE swelling.  SKIN:  Right chest CVC with minimal surrounding erythema without tenderness on palpation.  NEUROLOGIC:  Awake, alert, interactive.         Laboratory Data:       Inflammatory Markers    Recent Labs   Lab Test 05/07/22  1423 03/15/22  1442 02/23/22  1621 01/08/22  1430 11/16/21  1300 08/24/21  0855 07/13/21  1110 06/29/21  1016 11/17/20  1445 07/28/20  1607 06/28/19  1345 05/14/19  1145 02/12/18  1400 01/23/18  0845 01/20/18  1030 10/02/17  1030 09/24/17  1900 06/29/17  1009 06/28/17  2222 03/12/17  0351   SED  --   --  10  --   --   --   --   --   --  10  --  13  --  10 11  --  31*  --  26* 17*   CRP 34.0* <2.9 <2.9 52.0* <2.9 <2.9 7.8 <2.9   < > <2.9   < >  --    < > <2.9 5.4   < > 26.6*   < > 53.0* 3.4    < > = values in this interval not displayed.       Hematology Studies    Recent Labs   Lab Test 05/08/22  1003 05/07/22  1423 05/05/22  1245 04/26/22  1310 04/12/22  1030 04/06/22  1245 07/13/21  1110 06/29/21  1016 06/14/21  1017 06/09/21  1044 06/01/21  1117 05/25/21  1147 05/19/21  1342 05/18/21  1015   WBC 5.1 7.6 7.0 6.6 5.2 8.2   < > 6.9 8.1 7.5 7.3   < > 6.1 6.2   ANEU  --   --   --   --   --   --   --  3.1 4.1 4.7 4.3  --  3.5 3.7   AEOS  --   --    --   --   --   --   --  0.2 0.2 0.3 0.1  --  0.2 0.2   HGB 11.1* 11.1* 11.2* 13.1* 10.3* 10.0*   < > 12.1* 12.0* 13.3 12.1*   < > 12.5* 12.6*   MCV 95 98 96 93 96 97   < > 96 97 96 94   < > 97 98   * 138* 146* 242 160 139*   < > 178 158 169 174   < > 159 145*    < > = values in this interval not displayed.       Metabolic Studies     Recent Labs   Lab Test 05/09/22  1008 05/08/22  0643 05/07/22  1423 04/26/22  1310 04/12/22  1030    143 143 144 144   POTASSIUM 3.6 3.6 3.6 3.7 3.7   CHLORIDE 109 109 106 109 113*   CO2 29 29 27 30 28   BUN 10 8 12 8 19   CR 0.62* 0.64* 0.61*  0.61* 0.73 0.64*   GFRESTIMATED >90 >90 >90  >90 >90 >90       Hepatic Studies    Recent Labs   Lab Test 05/09/22  1008 05/08/22  0643 05/07/22  1423 04/26/22  1310 04/12/22  1030 04/06/22  1245   BILITOTAL 0.4 0.6 0.7 0.4  0.4 0.3  0.3 0.5  0.5   ALKPHOS 58 58 61 62 47 48   ALBUMIN 3.2* 3.3* 3.5 3.3* 3.1* 3.2*   AST 10 12 17 14 14 14   ALT 17 20 23 36 23 36       Microbiology:  Culture   Date Value Ref Range Status   05/08/2022 Positive on the 1st day of incubation (A)  Preliminary   05/08/2022 Gram positive cocci in clusters (AA)  Preliminary     Comment:     1 of 2 bottles   05/08/2022 Positive on the 1st day of incubation (A)  Preliminary   05/08/2022 Gram positive cocci in clusters (AA)  Preliminary     Comment:     1 of 2 bottles   05/07/2022 Positive on the 1st day of incubation (A)  Preliminary   05/07/2022 Staphylococcus epidermidis (AA)  Preliminary     Comment:     2 of 2 bottles  Susceptibilities done on previous cultures   05/07/2022 Positive on the 1st day of incubation (A)  Preliminary   05/07/2022 Staphylococcus epidermidis (AA)  Preliminary     Comment:     2 of 2 bottles  Susceptibilities done on previous cultures   05/07/2022 Staphylococcus haemolyticus (AA)  Preliminary     Comment:     1 of 2 bottles   05/07/2022 No growth after 1 day  Preliminary   05/05/2022 Positive on the 1st day of incubation (A)   Final   05/05/2022 Staphylococcus epidermidis (AA)  Final     Comment:     Susceptibilities done on previous cultures   05/05/2022 Positive on the 1st day of incubation (A)  Final   05/05/2022 Staphylococcus epidermidis (AA)  Final     Comment:     2 of 2 bottles   05/05/2022 Positive on the 1st day of incubation (A)  Final   05/05/2022 Staphylococcus epidermidis (AA)  Final     Comment:     2 of 2 bottles  Susceptibilities done on previous cultures   04/06/2022 No Growth  Final   04/06/2022 No Growth  Final   04/06/2022 No Growth  Final   02/24/2022 No Growth  Final   02/23/2022 No Growth  Final   02/23/2022 No Growth  Final   01/13/2022 No Growth  Final   01/13/2022 No Growth  Final   01/11/2022 No Growth  Final   01/11/2022 No Growth  Final   01/10/2022 <15 CFU Staphylococcus epidermidis (A)  Final     Comment:     Susceptibilities not routinely done   01/10/2022 Positive on the 2nd day of incubation (A)  Final   01/10/2022 Staphylococcus hominis (AA)  Final     Comment:     1 of 2 bottles  Susceptibilities done on previous cultures   01/10/2022 Positive on the 3rd day of incubation (A)  Final   01/10/2022 Staphylococcus hominis (AA)  Final     Comment:     1 of 2 bottles  Susceptibilities done on previous cultures   01/09/2022 Positive on the 2nd day of incubation (A)  Final   01/09/2022 Staphylococcus epidermidis (AA)  Final     Comment:     1 of 2 bottles     01/09/2022 Staphylococcus epidermidis (AA)  Final     Comment:     1 of 2 bottles   01/09/2022 No Growth  Final   01/08/2022 Positive on the 1st day of incubation (A)  Final   01/08/2022 Staphylococcus epidermidis (AA)  Final     Comment:     2 of 2 bottles  Susceptibilities done on previous cultures   01/08/2022 Positive on the 1st day of incubation (A)  Final   01/08/2022 Staphylococcus epidermidis (AA)  Final     Comment:     2 of 2 bottles  Susceptibilities done on previous cultures   01/08/2022 Staphylococcus hominis (AA)  Final     Comment:     2 of  2 bottles  Susceptibilities done on previous cultures   2022 Positive on the 1st day of incubation (A)  Final   2022 Staphylococcus epidermidis (AA)  Final     Comment:     2 of 2 bottles   2022 Staphylococcus hominis (AA)  Final     Comment:     1 of 2 bottles   2022 Positive on the 1st day of incubation (A)  Final   2022 Staphylococcus hominis (AA)  Final     Comment:     2 of 2 bottles  Susceptibilities done on previous cultures   2022 Staphylococcus epidermidis (AA)  Final     Comment:     2 of 2 bottles  Susceptibilities done on previous cultures   2021 No Growth  Final   2021 No Growth  Final   2021 No Growth  Final   2021 No Growth  Final   2021 No Growth  Final   2021 No Growth  Final   2021 No Growth  Final   2021 Positive on the 1st day of incubation (A)  Final   2021 Pseudomonas oryzihabitans (AA)  Final   2021 Pseudomonas oryzihabitans (AA)  Final     Comment:     1 of 2 bottles  Strain 2   08/10/2021 No Growth  Final   08/10/2021 No Growth  Final   08/10/2021 Positive on the 1st day of incubation (A)  Final   08/10/2021 Staphylococcus epidermidis (AA)  Final     Comment:     1 of 2 bottles            Imagin/7/22 RUE ultrasound read:  IMPRESSION:  1. Partially occlusive thrombus in the right subclavian vein.  2. Previously seen thrombus in the right internal jugular and  innominate vein is not seen on today's exam, compared to prior  ultrasound 2022.

## 2022-05-09 NOTE — PROGRESS NOTES
Physician Attestation   I, Reji Nicole MD, was present with the medical/JACQUI student who participated in the service and in the documentation of the note.  I have verified the history and personally performed the physical exam and medical decision making.  I agree with the assessment and plan of care as documented in the note.      I personally reviewed vital signs, medications, labs and imaging.    Staph bacteremia - most likely CLABSI. Hickmann to be removed today. Daily bcx until negative x48-72h before replacing line. No murmurs on exam, pursuing TTE to eval for endocarditis. PPN via PIV while CVC out. Restart TPN once CVC placed again.     Reji Nicole MD  Date of Service (when I saw the patient): 05/09/22        [facility]    Progress Note - Hospitalist Service, GOLD TEAM 8       Date of Admission:  5/7/2022    Assessment & Plan            Parker Acevedo is a 51 year old male admitted on 5/7/2022. He has a history of bariatric surgery (RYGB, esophagojejunostomy complicated by short gut syndrome) on chronic TPN, recurrent CLABSI, short-gut syndrome and hx of repeated line-releated bloody stream infections and is admitted for concern for staph epi bacteremia; likely CLABSI.     #staph epi bacteremia  #fevers, resolved  Patient was having multiple days of fever, instructed by ID to have blood cultures on 5/5 which resulted with staph epi in 2/3 blood cultures (drawn off of central line). Was contacted by ID that he should present to the emergency department for further evaluation and treatment. Here he was vitally stable, afebrile, only significant complaint is headache and tiredness. No focal infective source, Cm without drainage/purulence/bleeding/erythema though suspect for source as he has had positive BC prior while Cm was in place (S epi then).   - cefazolin 2g Q8h  - if fevers can consider switching to vanc  - ID consult  - TTE today  - IR  consult for cm removal; will need 72h line holiday prior to line replacement.   - daily bcx x 2 until NGTD for 48-72 consecutive hours     #R subclavian DVT  Patient admitted back 2/23 with R subclavian DVT (extended from proximal axillary vein to R internal jugular and brachiocephalic vein). Treated with heparin drip initially but heme/onc consulted and noted change from heparin infusion to subcu enoxaparin with plan to continue enoxaparin for 3-6 months, repeat US at 3 months. Still w/ DVT this presentation. Improved on imaging though still having swelling in R arm, pt reporting it is improved from when he first was diagnosed. Per Hematology, continue Enoxaparin 120 daily for total of 6 months.  - enoxaparin 120mg daily injection   - L upper extremity US: no e/o DVT    #s/p RYGB c/p short gut syndrome  Patient with history of bariatric surgery now with short gut syndrome. Reports regular diarrhea, nausea, abd pain, difficulty with eating occasionally and supplements nutrition with TPN per Cm. Reports that the symptoms he usually experiences from these are not too different, not concerned for new GI process or infectious process. Feeding tube in place as well. Phos and Mag wnl.  - Continue PPN  - regular diet  - continue home pantoprazole 40mg daily  - zofran PRN  - continue home carafate PRN  - PTA B12, vit D, other vitamins     #anemia  Hgb 11.1 on admission, appears at baseline  - monitor  - transfuse if <7     Diet: Combination Diet Regular Diet Adult  parenteral nutrition - Clinimix E    DVT Prophylaxis: Enoxaparin 120mg daily  Sanchez Catheter: Not present  Central Lines: PRESENT  CVC Double Lumen Right Internal jugular Valved;Tunneled-Site Assessment: WDL  Cardiac Monitoring: None  Code Status: Full Code      Disposition Plan   Expected Discharge: 05/12/2022     Anticipated discharge location: home with help/services     Delays: Removal and replacement of Cm       The patient's care was  discussed with the Attending Physician, Dr. Nicole.    [facility]  Medical Student  Hospitalist Service, GOLD TEAM 8  [facility]  Securely message with the Vocera Web Console (learn more here)  Text page via [facility] Paging/Directory   Please see signed in provider for up to date coverage information      Clinically Significant Risk Factors Present on Admission     ______________________________________________________________________    Interval History   Pt had LUE US showing no e/o DVT. Blood cultures from 5/8 show gram positive cocci in clusters. Patient has not had a bowel movement since last Monday, but reports that this is not unusual for him.     Data reviewed today: I reviewed all medications, new labs and imaging results over the last 24 hours. I personally reviewed LUE US.    Physical Exam   Vital Signs: Temp: 98.9  F (37.2  C) Temp src: Oral BP: 114/72 Pulse: 67   Resp: 16 SpO2: 100 % O2 Device: None (Room air)    Weight: 178 lbs 14.4 oz  Constitutional: awake, alert, cooperative, no apparent distress, and appears stated age  Respiratory: No increased work of breathing, good air exchange, clear to auscultation bilaterally, no crackles or wheezing  Cardiovascular: Regular rate and rhythm, normal S1 and S2, no S3 or S4, and no murmur noted  GI: Feeding tube site clean/dry, normal bowel sounds, soft, non-distended, non-tender, no masses palpated  Skin: Right great toe has dark purple hematoma, slightly boggy to palpation, no drainage or erythema    Data   Recent Labs   Lab 05/09/22  1008 05/09/22  0826 05/09/22  0103 05/08/22  1321 05/08/22  1003 05/08/22  0643 05/07/22  1423 05/07/22  1423 05/05/22  1245   WBC  --   --   --   --  5.1  --   --  7.6 7.0   HGB  --   --   --   --  11.1*  --   --  11.1* 11.2*   MCV  --   --   --   --  95  --   --  98 96   PLT  --   --   --   --  149*  --   --  138* 146*   INR 1.02  --   --   --   --  1.10  --  1.11  --      --    --   --   --  143  --  143  --    POTASSIUM 3.6  --   --   --   --  3.6  --  3.6  --    CHLORIDE 109  --   --   --   --  109  --  106  --    CO2 29  --   --   --   --  29  --  27  --    BUN 10  --   --   --   --  8  --  12  --    CR 0.62*  --   --   --   --  0.64*  --  0.61*  0.61*  --    ANIONGAP 5  --   --   --   --  5  --  10  --    SILAS 8.7  --   --   --   --  8.6  --  8.6  --    GLC 92 86 105*   < >  --  110*   < > 84  --    ALBUMIN 3.2*  --   --   --   --  3.3*   < > 3.5  --    PROTTOTAL 6.4*  --   --   --   --  6.7*   < > 6.8  --    BILITOTAL 0.4  --   --   --   --  0.6   < > 0.7  --    ALKPHOS 58  --   --   --   --  58   < > 61  --    ALT 17  --   --   --   --  20   < > 23  --    AST 10  --   --   --   --  12   < > 17  --    LIPASE  --   --   --   --   --   --   --  38*  --     < > = values in this interval not displayed.     Recent Results (from the past 24 hour(s))   US Upper Extremity Venous Duplex Right    Narrative    EXAMINATION: DOPPLER VENOUS ULTRASOUND OF THE RIGHT UPPER EXTREMITY,  5/9/2022 9:36 AM     COMPARISON: 5/7/2022 right upper extremity ultrasound.    HISTORY: Evaluate for vessel patency to pull IJ line    TECHNIQUE:  Gray-scale evaluation with compression, spectral flow and  color Doppler assessment of the deep venous system of the right upper  extremity.    FINDINGS:  Right: Normal blood flow and waveforms are demonstrated in the  internal jugular, innominate, subclavian, and axillary veins. There is  normal compressibility of the brachial, basilic and cephalic veins.      Impression    IMPRESSION: No evidence of right upper extremity deep venous  thrombosis.    I have personally reviewed the examination and initial interpretation  and I agree with the findings.    MANUEL CALDWELL MD         SYSTEM ID:  [id number]   US Upper Extremity Venous Duplex Left    Narrative    EXAMINATION: DOPPLER VENOUS ULTRASOUND OF THE LEFT UPPER EXTREMITY,  5/9/2022 10:52 AM     COMPARISON: 5/15/2019 left  Upper extremity venous ultrasound    HISTORY: eval patency before pulling right IJ line     TECHNIQUE:  Gray-scale evaluation with compression, spectral flow and  color Doppler assessment of the deep venous system of the left upper  extremity.    FINDINGS:    Left: Normal blood flow and waveforms are demonstrated in the internal  jugular, innominate, subclavian, and axillary veins. There is normal  compressibility of the brachial, basilic and cephalic veins.      Impression    IMPRESSION:    No evidence of left upper extremity deep venous thrombosis.    I have personally reviewed the examination and initial interpretation  and I agree with the findings.    MANUEL CALDWELL MD         SYSTEM ID:  [id number]

## 2022-05-09 NOTE — PROVIDER NOTIFICATION
Notified MD at 0315 AM regarding medication management.      Spoke with: MD    Orders were obtained.    Comments: Pt requesting nicotine patch and something for heartburn.    Gay Ann, RN on 5/9/2022 at 3:30 AM

## 2022-05-10 ENCOUNTER — APPOINTMENT (OUTPATIENT)
Dept: CARDIOLOGY | Facility: CLINIC | Age: 52
DRG: 315 | End: 2022-05-10
Attending: STUDENT IN AN ORGANIZED HEALTH CARE EDUCATION/TRAINING PROGRAM
Payer: COMMERCIAL

## 2022-05-10 LAB
ANION GAP SERPL CALCULATED.3IONS-SCNC: 4 MMOL/L (ref 3–14)
BACTERIA BLD CULT: ABNORMAL
BACTERIA BLD CULT: ABNORMAL
BUN SERPL-MCNC: 14 MG/DL (ref 7–30)
CALCIUM SERPL-MCNC: 8.6 MG/DL (ref 8.5–10.1)
CHLORIDE BLD-SCNC: 109 MMOL/L (ref 94–109)
CO2 SERPL-SCNC: 30 MMOL/L (ref 20–32)
CREAT SERPL-MCNC: 0.59 MG/DL (ref 0.66–1.25)
GFR SERPL CREATININE-BSD FRML MDRD: >90 ML/MIN/1.73M2
GLUCOSE BLD-MCNC: 99 MG/DL (ref 70–99)
GLUCOSE BLDC GLUCOMTR-MCNC: 103 MG/DL (ref 70–99)
GLUCOSE BLDC GLUCOMTR-MCNC: 103 MG/DL (ref 70–99)
GLUCOSE BLDC GLUCOMTR-MCNC: 122 MG/DL (ref 70–99)
GLUCOSE BLDC GLUCOMTR-MCNC: 94 MG/DL (ref 70–99)
LVEF ECHO: NORMAL
MAGNESIUM SERPL-MCNC: 2.2 MG/DL (ref 1.6–2.3)
PHOSPHATE SERPL-MCNC: 4.2 MG/DL (ref 2.5–4.5)
PLATELET # BLD AUTO: 174 10E3/UL (ref 150–450)
POTASSIUM BLD-SCNC: 3.8 MMOL/L (ref 3.4–5.3)
SODIUM SERPL-SCNC: 143 MMOL/L (ref 133–144)

## 2022-05-10 PROCEDURE — 82310 ASSAY OF CALCIUM: CPT | Performed by: INTERNAL MEDICINE

## 2022-05-10 PROCEDURE — 87040 BLOOD CULTURE FOR BACTERIA: CPT | Performed by: STUDENT IN AN ORGANIZED HEALTH CARE EDUCATION/TRAINING PROGRAM

## 2022-05-10 PROCEDURE — 250N000013 HC RX MED GY IP 250 OP 250 PS 637: Performed by: STUDENT IN AN ORGANIZED HEALTH CARE EDUCATION/TRAINING PROGRAM

## 2022-05-10 PROCEDURE — 250N000013 HC RX MED GY IP 250 OP 250 PS 637: Performed by: HOSPITALIST

## 2022-05-10 PROCEDURE — 999N000248 HC STATISTIC IV INSERT WITH US BY RN

## 2022-05-10 PROCEDURE — 999N000285 HC STATISTIC VASC ACCESS LAB DRAW WITH PIV START

## 2022-05-10 PROCEDURE — 99233 SBSQ HOSP IP/OBS HIGH 50: CPT | Mod: 24 | Performed by: INTERNAL MEDICINE

## 2022-05-10 PROCEDURE — 250N000009 HC RX 250

## 2022-05-10 PROCEDURE — 93306 TTE W/DOPPLER COMPLETE: CPT

## 2022-05-10 PROCEDURE — 250N000011 HC RX IP 250 OP 636

## 2022-05-10 PROCEDURE — 93306 TTE W/DOPPLER COMPLETE: CPT | Mod: 26 | Performed by: INTERNAL MEDICINE

## 2022-05-10 PROCEDURE — 85049 AUTOMATED PLATELET COUNT: CPT

## 2022-05-10 PROCEDURE — 250N000009 HC RX 250: Performed by: STUDENT IN AN ORGANIZED HEALTH CARE EDUCATION/TRAINING PROGRAM

## 2022-05-10 PROCEDURE — 36415 COLL VENOUS BLD VENIPUNCTURE: CPT | Performed by: STUDENT IN AN ORGANIZED HEALTH CARE EDUCATION/TRAINING PROGRAM

## 2022-05-10 PROCEDURE — 250N000013 HC RX MED GY IP 250 OP 250 PS 637

## 2022-05-10 PROCEDURE — 83735 ASSAY OF MAGNESIUM: CPT | Performed by: INTERNAL MEDICINE

## 2022-05-10 PROCEDURE — 84100 ASSAY OF PHOSPHORUS: CPT | Performed by: INTERNAL MEDICINE

## 2022-05-10 PROCEDURE — 99232 SBSQ HOSP IP/OBS MODERATE 35: CPT | Performed by: INTERNAL MEDICINE

## 2022-05-10 PROCEDURE — 120N000002 HC R&B MED SURG/OB UMMC

## 2022-05-10 RX ADMIN — FENTANYL 1 PATCH: 25 PATCH, EXTENDED RELEASE TRANSDERMAL at 21:38

## 2022-05-10 RX ADMIN — OXYCODONE HYDROCHLORIDE 10 MG: 5 SOLUTION ORAL at 21:36

## 2022-05-10 RX ADMIN — LIDOCAINE HYDROCHLORIDE 0.5 ML: 10 INJECTION, SOLUTION EPIDURAL; INFILTRATION; INTRACAUDAL; PERINEURAL at 06:09

## 2022-05-10 RX ADMIN — OXYCODONE HYDROCHLORIDE 10 MG: 5 SOLUTION ORAL at 15:33

## 2022-05-10 RX ADMIN — ENOXAPARIN SODIUM 120 MG: 120 INJECTION SUBCUTANEOUS at 21:35

## 2022-05-10 RX ADMIN — CEFAZOLIN SODIUM 2 G: 2 INJECTION, SOLUTION INTRAVENOUS at 05:56

## 2022-05-10 RX ADMIN — OXYCODONE HYDROCHLORIDE 10 MG: 5 SOLUTION ORAL at 09:43

## 2022-05-10 RX ADMIN — OXYCODONE HYDROCHLORIDE 10 MG: 5 SOLUTION ORAL at 03:48

## 2022-05-10 RX ADMIN — PANTOPRAZOLE SODIUM 40 MG: 40 TABLET, DELAYED RELEASE ORAL at 07:59

## 2022-05-10 RX ADMIN — ONDANSETRON 4 MG: 4 TABLET, ORALLY DISINTEGRATING ORAL at 09:43

## 2022-05-10 RX ADMIN — NICOTINE 1 PATCH: 21 PATCH, EXTENDED RELEASE TRANSDERMAL at 08:03

## 2022-05-10 RX ADMIN — CEFAZOLIN SODIUM 2 G: 2 INJECTION, SOLUTION INTRAVENOUS at 21:36

## 2022-05-10 RX ADMIN — ONDANSETRON 4 MG: 4 TABLET, ORALLY DISINTEGRATING ORAL at 15:33

## 2022-05-10 RX ADMIN — SUCRALFATE 1 G: 1 SUSPENSION ORAL at 03:48

## 2022-05-10 RX ADMIN — ASCORBIC ACID, VITAMIN A PALMITATE, CHOLECALCIFEROL, THIAMINE HYDROCHLORIDE, RIBOFLAVIN-5 PHOSPHATE SODIUM, PYRIDOXINE HYDROCHLORIDE, NIACINAMIDE, DEXPANTHENOL, ALPHA-TOCOPHEROL ACETATE, VITAMIN K1, FOLIC ACID, BIOTIN, CYANOCOBALAMIN: 200; 3300; 200; 6; 3.6; 6; 40; 15; 10; 150; 600; 60; 5 INJECTION, SOLUTION INTRAVENOUS at 22:32

## 2022-05-10 RX ADMIN — LORAZEPAM 1 MG: 1 TABLET ORAL at 21:36

## 2022-05-10 RX ADMIN — CARVEDILOL 12.5 MG: 12.5 TABLET, FILM COATED ORAL at 18:18

## 2022-05-10 RX ADMIN — CEFAZOLIN SODIUM 2 G: 2 INJECTION, SOLUTION INTRAVENOUS at 13:35

## 2022-05-10 RX ADMIN — CARVEDILOL 12.5 MG: 12.5 TABLET, FILM COATED ORAL at 07:54

## 2022-05-10 RX ADMIN — DEXTROAMPHETAMINE SACCHARATE, AMPHETAMINE ASPARTATE, DEXTROAMPHETAMINE SULFATE AND AMPHETAMINE SULFATE 20 MG: 2.5; 2.5; 2.5; 2.5 TABLET ORAL at 07:54

## 2022-05-10 ASSESSMENT — ACTIVITIES OF DAILY LIVING (ADL)
ADLS_ACUITY_SCORE: 20

## 2022-05-10 NOTE — PROGRESS NOTES
[facility] Service: Follow-up Note      Patient:  Parker Acevedo, Date of birth 1970, Medical record number [id number]  Date of Visit:  May 9, 2022  Reason for consult: CLABSI         Assessment and Recommendations:     ID Problem list:  1. CLABSI with Staph epidermidis (MSSE) bacteremia and now also Staph haemolyticus bacteremia  2. Fevers/chills  3. History of short gut syndrome, on TPN  4. History of prior CLABSI-related bloodstream infections  5. Right subclavian vein thrombus    Recs:  1. Agree with IV cefazolin 2g q8hr  2. Follow up Staph haemolyticus susceptibilities to ensure also susceptible to cefazolin  3. Agree with line holiday; repeat blood cultures until clearance; will follow up pending cultures (currently all positive through 5/8)        Discussion:  Parker is a 51M with history of bariatric surgery c/b short gut syndrome requiring TPN, and history of prior CLABSI-related bloodstream infections (follows in ID clinic with Dr. Buckner), who was admitted 5/7 for fevers/chills at home and found to have MSSE bacteremia. To date, all blood cultures from 5/5-5/8 are positive for Staph epidermidis (MSSE) and now also growing Staph haemolyticus (susceptibility pending), and repeat blood cultures from 5/9-5/10 currently pending. He is now s/p IR CVC removal on 5/9. Would continue to check daily blood cultures until clearance. Would recommend line holiday until blood cultures clear. If blood cultures positive after line removal, then in that case may have to consider extended IV antibiotic course.      Thank you for allowing us to participate in the care of this patient. ID will continue to follow.     Attestation:  Koko Gustafson MD  Infectious Diseases     05/10/2022            Interval History:     Patient reports no fevers overnight but some chills. No pain at the right chest site where the CVC was removed. Denies any discharge from the site.         Review of Systems:   8 point ROS  obtained, pertinent positives and negatives as above.          Current Antimicrobials   IV cefazolin         Physical Exam:   Ranges for vital signs:  Temp:  [97.4  F (36.3  C)-99.1  F (37.3  C)] 97.4  F (36.3  C)  Pulse:  [63-75] 73  Resp:  [18] 18  BP: (119-126)/(59-82) 119/59  SpO2:  [96 %-99 %] 98 %    Intake/Output Summary (Last 24 hours) at 5/9/2022 1411  Last data filed at 5/9/2022 1004  Gross per 24 hour   Intake 841.8 ml   Output 0 ml   Net 841.8 ml     Exam:  GENERAL:  Sitting up in bed in no acute distress.   ENT:  Head is normocephalic, atraumatic. Oropharynx is moist.  EYES:  Eyes have anicteric sclerae.    LUNGS:  Unlabored breathing on room air  CARDIOVASCULAR:  Regular rate.   ABDOMEN:  Non-distended, soft, nontender.  EXT: Extremities warm and well perfused.  +RUE swelling.  SKIN:  Right chest prior CVC bandaged without surrounding erythema or tenderness on palpation.  NEUROLOGIC:  Awake, alert, interactive.         Laboratory Data:       Inflammatory Markers    Recent Labs   Lab Test 05/07/22  1423 03/15/22  1442 02/23/22  1621 01/08/22  1430 11/16/21  1300 08/24/21  0855 07/13/21  1110 06/29/21  1016 11/17/20  1445 07/28/20  1607 06/28/19  1345 05/14/19  1145 02/12/18  1400 01/23/18  0845 01/20/18  1030 10/02/17  1030 09/24/17  1900 06/29/17  1009 06/28/17  2222 03/12/17  0351   SED  --   --  10  --   --   --   --   --   --  10  --  13  --  10 11  --  31*  --  26* 17*   CRP 34.0* <2.9 <2.9 52.0* <2.9 <2.9 7.8 <2.9   < > <2.9   < >  --    < > <2.9 5.4   < > 26.6*   < > 53.0* 3.4    < > = values in this interval not displayed.       Hematology Studies    Recent Labs   Lab Test 05/10/22  0619 05/08/22  1003 05/07/22  1423 05/05/22  1245 04/26/22  1310 04/12/22  1030 04/06/22  1245 07/13/21  1110 06/29/21  1016 06/14/21  1017 06/09/21  1044 06/01/21  1117 05/25/21  1147 05/19/21  1342 05/18/21  1015   WBC  --  5.1 7.6 7.0 6.6 5.2 8.2   < > 6.9 8.1 7.5 7.3   < > 6.1 6.2   ANEU  --   --   --   --   --    --   --   --  3.1 4.1 4.7 4.3  --  3.5 3.7   AEOS  --   --   --   --   --   --   --   --  0.2 0.2 0.3 0.1  --  0.2 0.2   HGB  --  11.1* 11.1* 11.2* 13.1* 10.3* 10.0*   < > 12.1* 12.0* 13.3 12.1*   < > 12.5* 12.6*   MCV  --  95 98 96 93 96 97   < > 96 97 96 94   < > 97 98    149* 138* 146* 242 160 139*   < > 178 158 169 174   < > 159 145*    < > = values in this interval not displayed.       Metabolic Studies     Recent Labs   Lab Test 05/10/22  0619 05/09/22  1008 05/08/22  0643 05/07/22  1423 04/26/22  1310    143 143 143 144   POTASSIUM 3.8 3.6 3.6 3.6 3.7   CHLORIDE 109 109 109 106 109   CO2 30 29 29 27 30   BUN 14 10 8 12 8   CR 0.59* 0.62* 0.64* 0.61*  0.61* 0.73   GFRESTIMATED >90 >90 >90 >90  >90 >90       Hepatic Studies    Recent Labs   Lab Test 05/09/22  1008 05/08/22  0643 05/07/22  1423 04/26/22  1310 04/12/22  1030 04/06/22  1245   BILITOTAL 0.4 0.6 0.7 0.4  0.4 0.3  0.3 0.5  0.5   ALKPHOS 58 58 61 62 47 48   ALBUMIN 3.2* 3.3* 3.5 3.3* 3.1* 3.2*   AST 10 12 17 14 14 14   ALT 17 20 23 36 23 36       Microbiology:  Culture   Date Value Ref Range Status   05/09/2022 No growth after 12 hours  Preliminary   05/09/2022 No growth after 12 hours  Preliminary   05/08/2022 Positive on the 1st day of incubation (A)  Preliminary   05/08/2022 Gram positive cocci in clusters (AA)  Preliminary     Comment:     1 of 2 bottles   05/08/2022 Positive on the 1st day of incubation (A)  Preliminary   05/08/2022 Gram positive cocci in clusters (AA)  Preliminary     Comment:     2 of 2 bottles   05/07/2022 Positive on the 1st day of incubation (A)  Final   05/07/2022 Staphylococcus epidermidis (AA)  Final     Comment:     2 of 2 bottles  Susceptibilities done on previous cultures   05/07/2022 Positive on the 1st day of incubation (A)  Preliminary   05/07/2022 Staphylococcus epidermidis (AA)  Preliminary     Comment:     2 of 2 bottles  Susceptibilities done on previous cultures   05/07/2022 Staphylococcus  haemolyticus (AA)  Preliminary     Comment:     1 of 2 bottles   05/07/2022 No growth after 2 days  Preliminary   05/05/2022 Positive on the 1st day of incubation (A)  Final   05/05/2022 Staphylococcus epidermidis (AA)  Final     Comment:     Susceptibilities done on previous cultures   05/05/2022 Positive on the 1st day of incubation (A)  Final   05/05/2022 Staphylococcus epidermidis (AA)  Final     Comment:     2 of 2 bottles   05/05/2022 Positive on the 1st day of incubation (A)  Final   05/05/2022 Staphylococcus epidermidis (AA)  Final     Comment:     2 of 2 bottles  Susceptibilities done on previous cultures   04/06/2022 No Growth  Final   04/06/2022 No Growth  Final   04/06/2022 No Growth  Final   02/24/2022 No Growth  Final   02/23/2022 No Growth  Final   02/23/2022 No Growth  Final   01/13/2022 No Growth  Final   01/13/2022 No Growth  Final   01/11/2022 No Growth  Final   01/11/2022 No Growth  Final   01/10/2022 <15 CFU Staphylococcus epidermidis (A)  Final     Comment:     Susceptibilities not routinely done   01/10/2022 Positive on the 2nd day of incubation (A)  Final   01/10/2022 Staphylococcus hominis (AA)  Final     Comment:     1 of 2 bottles  Susceptibilities done on previous cultures   01/10/2022 Positive on the 3rd day of incubation (A)  Final   01/10/2022 Staphylococcus hominis (AA)  Final     Comment:     1 of 2 bottles  Susceptibilities done on previous cultures   01/09/2022 Positive on the 2nd day of incubation (A)  Final   01/09/2022 Staphylococcus epidermidis (AA)  Final     Comment:     1 of 2 bottles     01/09/2022 Staphylococcus epidermidis (AA)  Final     Comment:     1 of 2 bottles   01/09/2022 No Growth  Final   01/08/2022 Positive on the 1st day of incubation (A)  Final   01/08/2022 Staphylococcus epidermidis (AA)  Final     Comment:     2 of 2 bottles  Susceptibilities done on previous cultures   01/08/2022 Positive on the 1st day of incubation (A)  Final   01/08/2022 Staphylococcus  epidermidis (AA)  Final     Comment:     2 of 2 bottles  Susceptibilities done on previous cultures   2022 Staphylococcus hominis (AA)  Final     Comment:     2 of 2 bottles  Susceptibilities done on previous cultures   2022 Positive on the 1st day of incubation (A)  Final   2022 Staphylococcus epidermidis (AA)  Final     Comment:     2 of 2 bottles   2022 Staphylococcus hominis (AA)  Final     Comment:     1 of 2 bottles   2022 Positive on the 1st day of incubation (A)  Final   2022 Staphylococcus hominis (AA)  Final     Comment:     2 of 2 bottles  Susceptibilities done on previous cultures   2022 Staphylococcus epidermidis (AA)  Final     Comment:     2 of 2 bottles  Susceptibilities done on previous cultures   2021 No Growth  Final   2021 No Growth  Final   2021 No Growth  Final   2021 No Growth  Final   2021 No Growth  Final   2021 No Growth  Final   2021 No Growth  Final   2021 Positive on the 1st day of incubation (A)  Final   2021 Pseudomonas oryzihabitans (AA)  Final   2021 Pseudomonas oryzihabitans (AA)  Final     Comment:     1 of 2 bottles  Strain 2   08/10/2021 No Growth  Final   08/10/2021 No Growth  Final   08/10/2021 Positive on the 1st day of incubation (A)  Final   08/10/2021 Staphylococcus epidermidis (AA)  Final     Comment:     1 of 2 bottles            Imagin/7/22 RUE ultrasound read:  IMPRESSION:  1. Partially occlusive thrombus in the right subclavian vein.  2. Previously seen thrombus in the right internal jugular and  innominate vein is not seen on today's exam, compared to prior  ultrasound 2022.

## 2022-05-10 NOTE — PROGRESS NOTES
Patient has been educated on potential risks of choosing to leave the unit and that the responsibility for patient well-being will belong to the patient. Pt has been informed that admission to hospital is due to need for medical treatment. Education given to the patient on some of the potential risks included but are not limited to:      - lack of access to nursing intervention      - possible missed appointments with MD, therapies, tests      - possible missed medications, antibiotics, management of IV's    Patient Response: Understands & will let us know when he is going outside    Patient notified staff prior to leaving unit: Yes  Coban wrap placed over IV prior to pt leaving unit No

## 2022-05-10 NOTE — PROGRESS NOTES
Physician Attestation   I, Shannon Puga MD, was present with the medical/JACQUI student who participated in the service and in the documentation of the note.  I have verified the history and personally performed the physical exam and medical decision making.  I agree with the assessment and plan of care as documented in the note.  I have edited the note to reflect my own findings and decision making    I personally reviewed vital signs, medications, labs and imaging.    Key findings: Continuing to follow blood cultures, most recent positive blood culture 5/8/2022.  Cm removed yesterday 5/9.  Continuing antibiotics, appreciate ID recommendations.  TTE completed today without evidence of endocarditis.  Patient has not continued fevering, notes that he is feeling overall well and is hoping to go home soon.  We will discuss with ID earliest that line can be replaced and coordinate with IR, determine antibiotic duration.    Vitals reviewed  Gen: NAD, sitting up comfortably in bed, pleasant and cooperative with interview and exam  HEENT: normocephalic, no scleral icterus, no perioral lesions, oral mucosa moist  CV: RRR at time of exam  Pulm: good air movement bilaterally without wheezing  Abd: soft, +bs, nontender to palpation diffusely, nondistended  LE: no edema bilaterally  Skin: no rash or jaundice on limited exam; examined site of Cm removal and Steri-Strips intact with site without surrounding edema or erythema or drainage  Neuro: AOx3, no tremor, gait stable  Psych: mood-affect congruent      Shannon Puga MD  Date of Service (when I saw the patient): 05/10/22    [facility]    Progress Note - Hospitalist Service, GOLD TEAM 8       Date of Admission:  5/7/2022    Assessment & Plan          Parker Acevedo is a 51 year old male admitted on 5/7/2022. He has a history of bariatric surgery (RYGB, esophagojejunostomy complicated by short gut  syndrome) on chronic TPN, recurrent CLABSI, short-gut syndrome and hx of repeated line-releated bloody stream infections and is admitted for concern for staph epi bacteremia; likely CLABSI.     #staph epi bacteremia  #staph haemolyticus bacteremia  #fevers, resolved  Patient was having multiple days of fever, instructed by ID to have blood cultures on 5/5 which resulted with staph epi in 2/3 blood cultures (drawn off of central line). Was contacted by ID that he should present to the emergency department for further evaluation and treatment. Here he was vitally stable, afebrile, only significant complaint is headache and tiredness. No focal infective source, Cm without drainage/purulence/bleeding/erythema though suspect for source as he has had positive BC prior while Cm was in place (S epi then).   - cefazolin 2g Q8h  - follow up Staph haemolyticus susceptibilities to ensure also susceptible to cefazolin  - ID consult, appreciate recommendations  - TTE today  - Cm removed 5/9; will need line holiday prior to line replacement.   - daily bcx x 2 until NGTD for 48-72 consecutive hours, last positive culture 5/8/22     #R subclavian DVT  Patient admitted back 2/23 with R subclavian DVT (extended from proximal axillary vein to R internal jugular and brachiocephalic vein). Treated with heparin drip initially but heme/onc consulted and noted change from heparin infusion to subcu enoxaparin with plan to continue enoxaparin for 3-6 months, repeat US at 3 months. Still w/ DVT this presentation. Improved on imaging though still having swelling in R arm, pt reporting it is improved from when he first was diagnosed. L upper extremity US: no e/o DVT. Per Hematology, continue Enoxaparin 120 daily for total of 6 months.  - enoxaparin 120mg daily injection (likely chosen agent due to short gut syndrome and absorption changes associated with this)     #s/p RYGB c/p short gut syndrome  Patient with history of bariatric  "surgery now with short gut syndrome. Reports regular diarrhea, nausea, abd pain, difficulty with eating occasionally and supplements nutrition with TPN per Cm. Reports that the symptoms he usually experiences from these are not too different, not concerned for new GI process or infectious process. Feeding tube in place as well. Phos and Mag wnl.  - Continue PPN  - regular diet  - continue home pantoprazole 40mg daily  - zofran PRN  - continue home carafate PRN  - PTA B12, vit D, other vitamins     #anemia  Hgb 11.1 on admission, appears at baseline  - monitor  - transfuse if <7    Screen for urine retention  - bladder scan, consider straight cath if PVR >250cc     Diet: Combination Diet Regular Diet Adult  parenteral nutrition - Clinimix E    DVT Prophylaxis: Enoxaparin 120mg daily  Sanchez Catheter: Not present  Central Lines: None  Cardiac Monitoring: None  Code Status: Full Code      Disposition Plan   Expected Discharge: 05/12/2022     Anticipated discharge location: home with help/services     Delays: Removal and replacement of Cm       The patient's care was discussed with the Attending Physician, Dr. Puga.    [facility]  Medical Student  Hospitalist Service, [address]  Securely message with the Vocera Web Console (learn more here)  Text page via [facility] Paging/Directory   Please see signed in provider for up to date coverage information      Clinically Significant Risk Factors Present on Admission             # Overweight: Estimated body mass index is 25.05 kg/m  as calculated from the following:    Height as of this encounter: 1.803 m (5' 11\").    Weight as of this encounter: 81.5 kg (179 lb 9.6 oz).      ______________________________________________________________________    Interval History   No bowel movement since last Monday, which pt says is normal for him. No urine output since last evening. Blood cultures from 5/9 " NGTD.    Data reviewed today: I reviewed all medications, new labs and imaging results over the last 24 hours. No new imaging.    Physical Exam   Vital Signs: Temp: 97.4  F (36.3  C) Temp src: Axillary BP: 119/59 Pulse: 73   Resp: 18 SpO2: 98 % O2 Device: None (Room air)    Weight: 179 lbs 9.6 oz  Constitutional: awake, alert, cooperative, no apparent distress, and appears stated age  Respiratory: No increased work of breathing, good air exchange, clear to auscultation bilaterally, no crackles or wheezing  Cardiovascular: Regular rate and rhythm, normal S1 and S2, no S3 or S4, and no murmur noted  GI: Feeding tube site clean/dry, normal bowel sounds, soft, non-distended, non-tender, no masses palpated  Skin: Wound on right upper chest is clean, dry, intact. No surrounding erythema.    Data   Recent Labs   Lab 05/10/22  0829 05/10/22  0619 05/10/22  0025 05/09/22  1246 05/09/22  1008 05/08/22  1321 05/08/22  1003 05/08/22  0643 05/07/22  1423 05/05/22  1245 05/05/22  1245   WBC  --   --   --   --   --   --  5.1  --  7.6  --  7.0   HGB  --   --   --   --   --   --  11.1*  --  11.1*  --  11.2*   MCV  --   --   --   --   --   --  95  --  98  --  96   PLT  --  174  --   --   --   --  149*  --  138*  --  146*   INR  --   --   --   --  1.02  --   --  1.10 1.11  --   --    NA  --  143  --   --  143  --   --  143 143   < >  --    POTASSIUM  --  3.8  --   --  3.6  --   --  3.6 3.6   < >  --    CHLORIDE  --  109  --   --  109  --   --  109 106   < >  --    CO2  --  30  --   --  29  --   --  29 27   < >  --    BUN  --  14  --   --  10  --   --  8 12   < >  --    CR  --  0.59*  --   --  0.62*  --   --  0.64* 0.61*  0.61*   < >  --    ANIONGAP  --  4  --   --  5  --   --  5 10   < >  --    SILAS  --  8.6  --   --  8.7  --   --  8.6 8.6   < >  --    * 99 122*   < > 92   < >  --  110* 84   < >  --    ALBUMIN  --   --   --   --  3.2*  --   --  3.3* 3.5   < >  --    PROTTOTAL  --   --   --   --  6.4*  --   --  6.7* 6.8   < >   --    BILITOTAL  --   --   --   --  0.4  --   --  0.6 0.7   < >  --    ALKPHOS  --   --   --   --  58  --   --  58 61   < >  --    ALT  --   --   --   --  17  --   --  20 23   < >  --    AST  --   --   --   --  10  --   --  12 17   < >  --    LIPASE  --   --   --   --   --   --   --   --  38*  --   --     < > = values in this interval not displayed.     No results found for this or any previous visit (from the past 24 hour(s)).

## 2022-05-10 NOTE — PLAN OF CARE
Pt is vss and A&Ox4. Pain tolerated with Oxycodone. Up ad vitor- pt goes outside often. Has intermittent nausea that was relieved with Zofran- poor PO intake. Pt is voiding spontaneously- did not void until 1655 & team is aware. Not passing gas and no BM this shift. Pt is clamping/unclamping & emptying G-tube independently (not measuring). PPN infusing into PIV at 84 mL/hr. Fentanyl and nicotine patches in place. ECHO done today. Will continue plan of care.

## 2022-05-10 NOTE — PLAN OF CARE
"Patient is alert and oriented x4. VSS on RA. PPN infusing into L PIV at 84 mL/hr. Patient asked writer to start PPN later (started at 2209). Refused lipids, does not want it through PIV. A new PIV was placed this morning to prevent stopping PPN for abx. Patient stated he self manage G tube. Small output in the bag, not emptied. Chronic pain managed with oxycodone 2x. Fentanyl and nicotine patches in place. Ativan given for anxiety once. Slept between cares. Continue with plan of care.    /81 (BP Location: Left arm)   Pulse 63   Temp 97.9  F (36.6  C) (Axillary)   Resp 18   Ht 1.803 m (5' 11\")   Wt 81.1 kg (178 lb 14.4 oz)   SpO2 96%   BMI 24.95 kg/m      "

## 2022-05-10 NOTE — PROVIDER NOTIFICATION
Jose Eduardo Bonilla, Chasidy Sutherland MD at 0614.    FYI, Lab is supposed to draw BC from BUE but patient have a limb alert on RUE. Lab is drawing only 1 BC.    Lab later said they tata the BC from LUE from 2 different sites. Provider was updated.    Waiting for response.

## 2022-05-11 LAB
ANION GAP SERPL CALCULATED.3IONS-SCNC: 6 MMOL/L (ref 3–14)
BACTERIA BLD CULT: ABNORMAL
BASOPHILS # BLD AUTO: 0 10E3/UL (ref 0–0.2)
BASOPHILS NFR BLD AUTO: 1 %
BUN SERPL-MCNC: 18 MG/DL (ref 7–30)
CALCIUM SERPL-MCNC: 8.9 MG/DL (ref 8.5–10.1)
CHLORIDE BLD-SCNC: 107 MMOL/L (ref 94–109)
CO2 SERPL-SCNC: 29 MMOL/L (ref 20–32)
CREAT SERPL-MCNC: 0.61 MG/DL (ref 0.66–1.25)
EOSINOPHIL # BLD AUTO: 0.2 10E3/UL (ref 0–0.7)
EOSINOPHIL NFR BLD AUTO: 3 %
ERYTHROCYTE [DISTWIDTH] IN BLOOD BY AUTOMATED COUNT: 14.2 % (ref 10–15)
GFR SERPL CREATININE-BSD FRML MDRD: >90 ML/MIN/1.73M2
GLUCOSE BLD-MCNC: 90 MG/DL (ref 70–99)
GLUCOSE BLDC GLUCOMTR-MCNC: 100 MG/DL (ref 70–99)
GLUCOSE BLDC GLUCOMTR-MCNC: 96 MG/DL (ref 70–99)
HCT VFR BLD AUTO: 38 % (ref 40–53)
HGB BLD-MCNC: 12.1 G/DL (ref 13.3–17.7)
IMM GRANULOCYTES # BLD: 0 10E3/UL
IMM GRANULOCYTES NFR BLD: 1 %
LYMPHOCYTES # BLD AUTO: 2.1 10E3/UL (ref 0.8–5.3)
LYMPHOCYTES NFR BLD AUTO: 33 %
MAGNESIUM SERPL-MCNC: 2.2 MG/DL (ref 1.6–2.3)
MCH RBC QN AUTO: 30.7 PG (ref 26.5–33)
MCHC RBC AUTO-ENTMCNC: 31.8 G/DL (ref 31.5–36.5)
MCV RBC AUTO: 96 FL (ref 78–100)
MONOCYTES # BLD AUTO: 0.6 10E3/UL (ref 0–1.3)
MONOCYTES NFR BLD AUTO: 10 %
NEUTROPHILS # BLD AUTO: 3.4 10E3/UL (ref 1.6–8.3)
NEUTROPHILS NFR BLD AUTO: 52 %
NRBC # BLD AUTO: 0 10E3/UL
NRBC BLD AUTO-RTO: 0 /100
PHOSPHATE SERPL-MCNC: 4.4 MG/DL (ref 2.5–4.5)
PLATELET # BLD AUTO: 164 10E3/UL (ref 150–450)
POTASSIUM BLD-SCNC: 3.9 MMOL/L (ref 3.4–5.3)
RBC # BLD AUTO: 3.94 10E6/UL (ref 4.4–5.9)
SODIUM SERPL-SCNC: 142 MMOL/L (ref 133–144)
WBC # BLD AUTO: 6.5 10E3/UL (ref 4–11)

## 2022-05-11 PROCEDURE — 85014 HEMATOCRIT: CPT | Performed by: INTERNAL MEDICINE

## 2022-05-11 PROCEDURE — 250N000009 HC RX 250

## 2022-05-11 PROCEDURE — 250N000013 HC RX MED GY IP 250 OP 250 PS 637: Performed by: HOSPITALIST

## 2022-05-11 PROCEDURE — 250N000011 HC RX IP 250 OP 636: Performed by: INTERNAL MEDICINE

## 2022-05-11 PROCEDURE — 36415 COLL VENOUS BLD VENIPUNCTURE: CPT | Performed by: INTERNAL MEDICINE

## 2022-05-11 PROCEDURE — 250N000013 HC RX MED GY IP 250 OP 250 PS 637

## 2022-05-11 PROCEDURE — 83735 ASSAY OF MAGNESIUM: CPT | Performed by: INTERNAL MEDICINE

## 2022-05-11 PROCEDURE — 120N000002 HC R&B MED SURG/OB UMMC

## 2022-05-11 PROCEDURE — 84100 ASSAY OF PHOSPHORUS: CPT | Performed by: INTERNAL MEDICINE

## 2022-05-11 PROCEDURE — 36415 COLL VENOUS BLD VENIPUNCTURE: CPT | Performed by: STUDENT IN AN ORGANIZED HEALTH CARE EDUCATION/TRAINING PROGRAM

## 2022-05-11 PROCEDURE — 99232 SBSQ HOSP IP/OBS MODERATE 35: CPT | Performed by: INTERNAL MEDICINE

## 2022-05-11 PROCEDURE — 99232 SBSQ HOSP IP/OBS MODERATE 35: CPT | Mod: 24 | Performed by: INTERNAL MEDICINE

## 2022-05-11 PROCEDURE — 250N000011 HC RX IP 250 OP 636

## 2022-05-11 PROCEDURE — 87040 BLOOD CULTURE FOR BACTERIA: CPT | Performed by: STUDENT IN AN ORGANIZED HEALTH CARE EDUCATION/TRAINING PROGRAM

## 2022-05-11 PROCEDURE — 258N000003 HC RX IP 258 OP 636

## 2022-05-11 PROCEDURE — 80048 BASIC METABOLIC PNL TOTAL CA: CPT | Performed by: INTERNAL MEDICINE

## 2022-05-11 PROCEDURE — 250N000013 HC RX MED GY IP 250 OP 250 PS 637: Performed by: STUDENT IN AN ORGANIZED HEALTH CARE EDUCATION/TRAINING PROGRAM

## 2022-05-11 PROCEDURE — 258N000003 HC RX IP 258 OP 636: Performed by: INTERNAL MEDICINE

## 2022-05-11 RX ADMIN — OXYCODONE HYDROCHLORIDE 10 MG: 5 SOLUTION ORAL at 03:19

## 2022-05-11 RX ADMIN — OXYCODONE HYDROCHLORIDE 10 MG: 5 SOLUTION ORAL at 21:58

## 2022-05-11 RX ADMIN — ONDANSETRON 4 MG: 4 TABLET, ORALLY DISINTEGRATING ORAL at 15:32

## 2022-05-11 RX ADMIN — OXYCODONE HYDROCHLORIDE 10 MG: 5 SOLUTION ORAL at 15:32

## 2022-05-11 RX ADMIN — CARVEDILOL 12.5 MG: 12.5 TABLET, FILM COATED ORAL at 18:17

## 2022-05-11 RX ADMIN — LORAZEPAM 1 MG: 1 TABLET ORAL at 21:58

## 2022-05-11 RX ADMIN — CARVEDILOL 12.5 MG: 12.5 TABLET, FILM COATED ORAL at 09:45

## 2022-05-11 RX ADMIN — OXYCODONE HYDROCHLORIDE 10 MG: 5 SOLUTION ORAL at 09:45

## 2022-05-11 RX ADMIN — ONDANSETRON 4 MG: 4 TABLET, ORALLY DISINTEGRATING ORAL at 03:19

## 2022-05-11 RX ADMIN — ONDANSETRON 4 MG: 4 TABLET, ORALLY DISINTEGRATING ORAL at 21:58

## 2022-05-11 RX ADMIN — CEFAZOLIN SODIUM 2 G: 2 INJECTION, SOLUTION INTRAVENOUS at 06:22

## 2022-05-11 RX ADMIN — ASCORBIC ACID, VITAMIN A PALMITATE, CHOLECALCIFEROL, THIAMINE HYDROCHLORIDE, RIBOFLAVIN-5 PHOSPHATE SODIUM, PYRIDOXINE HYDROCHLORIDE, NIACINAMIDE, DEXPANTHENOL, ALPHA-TOCOPHEROL ACETATE, VITAMIN K1, FOLIC ACID, BIOTIN, CYANOCOBALAMIN: 200; 3300; 200; 6; 3.6; 6; 40; 15; 10; 150; 600; 60; 5 INJECTION, SOLUTION INTRAVENOUS at 19:35

## 2022-05-11 RX ADMIN — DIPHENHYDRAMINE HYDROCHLORIDE 25 MG: 50 INJECTION, SOLUTION INTRAMUSCULAR; INTRAVENOUS at 12:17

## 2022-05-11 RX ADMIN — NICOTINE 1 PATCH: 21 PATCH, EXTENDED RELEASE TRANSDERMAL at 09:46

## 2022-05-11 RX ADMIN — VANCOMYCIN HYDROCHLORIDE 1250 MG: 500 INJECTION, POWDER, LYOPHILIZED, FOR SOLUTION INTRAVENOUS at 12:36

## 2022-05-11 RX ADMIN — DEXTROAMPHETAMINE SACCHARATE, AMPHETAMINE ASPARTATE, DEXTROAMPHETAMINE SULFATE AND AMPHETAMINE SULFATE 20 MG: 2.5; 2.5; 2.5; 2.5 TABLET ORAL at 09:45

## 2022-05-11 RX ADMIN — PANTOPRAZOLE SODIUM 40 MG: 40 TABLET, DELAYED RELEASE ORAL at 09:45

## 2022-05-11 ASSESSMENT — ACTIVITIES OF DAILY LIVING (ADL)
ADLS_ACUITY_SCORE: 20

## 2022-05-11 NOTE — PLAN OF CARE
Assumed cares of pt [id number]. Pt is AOVSS on RA. Up independent. Pain managed with Oxycodone. Nausea intermittent managed with Zofran. Pt G-tube intact and draining, pt manages cares and emptying without measuring. Parenteral nutrition thru PIV, started late per pt request and also needing to give antibiotic. Pt down to one PIV. Pt has a ride on Thursday @ 5pm and is requesting to discharge by then. Pt disconnected TPN @ 0530 stating its making him feel full and bloated. Antibiotic started @ 0620 and then will restart PPN. Continue POC

## 2022-05-11 NOTE — PROGRESS NOTES
Physician Attestation   I, Shannon Puga MD, was present with the medical/JACQUI student who participated in the service and in the documentation of the note.  I have verified the history and personally performed the physical exam and medical decision making.  I agree with the assessment and plan of care as documented in the note.      I personally reviewed vital signs, medications, labs and imaging.    Blood cultures remain negative starting 5/9/2022.  Changed today from cefepime to vancomycin given oxacillin resistant staph hemolyticus in sample.  IR consult placed with tentative plan for line replacement tomorrow 5/12 if blood cultures remain negative (would be 72 hours clear cultures).  Patient anxious to discharge and frequently wandering around the halls.  Holding therapeutic enoxaparin dose in the morning in anticipation of line placement, will make n.p.o. at midnight if conscious sedation needed with IR tomorrow.    Vitals reviewed  Gen: NAD, ambulating frequently in halls, pleasant and cooperative with interview and exam  HEENT: normocephalic, no scleral icterus, no perioral lesions, oral mucosa moist  CV: RRR at time of exam  Pulm: good air movement bilaterally without wheezing  Abd: nondistended  LE: no edema bilaterally  Skin: no rash or jaundice on limited exam  Neuro: AOx3, no tremor, gait stable  Psych: mood-affect congruent    Shannon Puga MD  Date of Service (when I saw the patient): 05/11/22    [facility]    Progress Note - Hospitalist Service, GOLD TEAM 8       Date of Admission:  5/7/2022    Assessment & Plan            Parker Acevedo is a 51 year old male admitted on 5/7/2022. He has a history of bariatric surgery (RYGB, esophagojejunostomy complicated by short gut syndrome) on chronic TPN, recurrent CLABSI, short-gut syndrome and hx of repeated line-releated bloody stream infections and is admitted for concern for staph epi  bacteremia; likely CLABSI.     #staph epi bacteremia  #staph haemolyticus bacteremia  #fevers, resolved  Patient was having multiple days of fever, instructed by ID to have blood cultures on 5/5 which resulted with staph epi in 2/3 blood cultures (drawn off of central line). Was contacted by ID that he should present to the emergency department for further evaluation and treatment. Here he was vitally stable, afebrile, only significant complaint is headache and tiredness. No focal infective source, Cm without drainage/purulence/bleeding/erythema though suspect for source as he has had positive BC prior while Cm was in place (S epi then). TTE unremarkable.  - cefazolin changed to IV Vancomycin given oxacillin-resistant S. haemolyticus  - ID consult, appreciate recommendations  - Cm removed 5/9; plan for replacement 5/12  - daily bcx x 2 until NGTD for 48-72 consecutive hours, last positive culture 5/8/22  - NPO at midnight    #R subclavian DVT  Patient admitted back 2/23 with R subclavian DVT (extended from proximal axillary vein to R internal jugular and brachiocephalic vein). Treated with heparin drip initially but heme/onc consulted and noted change from heparin infusion to subcu enoxaparin with plan to continue enoxaparin for 3-6 months, repeat US at 3 months. Still w/ DVT this presentation. Improved on imaging though still having swelling in R arm, pt reporting it is improved from when he first was diagnosed. L upper extremity US: no e/o DVT. Per Hematology, continue Enoxaparin 120 daily for total of 6 months.  - enoxaparin 120mg daily injection (likely chosen agent due to short gut syndrome and absorption changes associated with this)- hold in AM for Cm replacement     #s/p RYGB c/p short gut syndrome  Patient with history of bariatric surgery now with short gut syndrome. Reports regular diarrhea, nausea, abd pain, difficulty with eating occasionally and supplements nutrition with TPN per  "Cm. Reports that the symptoms he usually experiences from these are not too different, not concerned for new GI process or infectious process. Feeding tube in place as well. Phos and Mag wnl.  - Continue PPN  - regular diet (NPO at midnight)  - continue home pantoprazole 40mg daily  - zofran PRN  - continue home carafate PRN  - PTA B12, vit D, other vitamins     #anemia  Hgb 11.1 on admission, appears at baseline  - monitor  - transfuse if <7     Diet: Combination Diet Regular Diet Adult  parenteral nutrition - Clinimix E  parenteral nutrition - Clinimix E    NPO at midnight  DVT Prophylaxis: Enoxaparin 120mg daily- hold in AM  Sanchez Catheter: Not present  Central Lines: None  Cardiac Monitoring: None  Code Status: Full Code      Disposition Plan   Expected Discharge: 5/12/22  Anticipated discharge location: home with help/services     Delays: Removal and replacement of Cm       The patient's care was discussed with the Attending Physician, Dr. Puga.    [facility]  Medical Student  Hospitalist Service, [address]  Securely message with the Vocera Web Console (learn more here)  Text page via [facility] Paging/Directory   Please see signed in provider for up to date coverage information      Clinically Significant Risk Factors Present on Admission             # Overweight: Estimated body mass index is 25.05 kg/m  as calculated from the following:    Height as of this encounter: 1.803 m (5' 11\").    Weight as of this encounter: 81.5 kg (179 lb 9.6 oz).      ______________________________________________________________________    Interval History   NAEO. Echo unremarkable. Blood cultures from 5/9 and 5/10 NGTD. Cefazolin changed to Vancomycin given Staph haemolyticus is oxacillin-resistant. Pt reports he normally urinates 2-3x/d.    Data reviewed today: I reviewed all medications, new labs and imaging results over the last 24 hours. No new " imaging.    Physical Exam   Vital Signs: Temp: 98.3  F (36.8  C) Temp src: Oral BP: (!) 179/117 (Notify to RN; Pt refused recheck) Pulse: 75   Resp: 18 SpO2: 99 % O2 Device: None (Room air)    Weight: 179 lbs 9.6 oz  Constitutional: awake, alert, cooperative, no apparent distress, and appears stated age  Respiratory: No increased work of breathing, good air exchange, clear to auscultation bilaterally, no crackles or wheezing  Cardiovascular: Regular rate and rhythm, normal S1 and S2, no S3 or S4, and no murmur noted  GI: Feeding tube site clean/dry, normal bowel sounds, soft, non-distended, non-tender, no masses palpated  Skin: Wound on right upper chest is clean, dry, intact. No surrounding erythema.    Data   Recent Labs   Lab 05/11/22  0747 05/10/22  2239 05/10/22  1615 05/10/22  0829 05/10/22  0619 05/09/22  1246 05/09/22  1008 05/08/22  1321 05/08/22  1003 05/08/22  0643 05/07/22  1423   WBC 6.5  --   --   --   --   --   --   --  5.1  --  7.6   HGB 12.1*  --   --   --   --   --   --   --  11.1*  --  11.1*   MCV 96  --   --   --   --   --   --   --  95  --  98     --   --   --  174  --   --   --  149*  --  138*   INR  --   --   --   --   --   --  1.02  --   --  1.10 1.11     --   --   --  143  --  143  --   --  143 143   POTASSIUM 3.9  --   --   --  3.8  --  3.6  --   --  3.6 3.6   CHLORIDE 107  --   --   --  109  --  109  --   --  109 106   CO2 29  --   --   --  30  --  29  --   --  29 27   BUN 18  --   --   --  14  --  10  --   --  8 12   CR 0.61*  --   --   --  0.59*  --  0.62*  --   --  0.64* 0.61*  0.61*   ANIONGAP 6  --   --   --  4  --  5  --   --  5 10   SILAS 8.9  --   --   --  8.6  --  8.7  --   --  8.6 8.6   GLC 90 94 103*   < > 99   < > 92   < >  --  110* 84   ALBUMIN  --   --   --   --   --   --  3.2*  --   --  3.3* 3.5   PROTTOTAL  --   --   --   --   --   --  6.4*  --   --  6.7* 6.8   BILITOTAL  --   --   --   --   --   --  0.4  --   --  0.6 0.7   ALKPHOS  --   --   --   --   --   --   58  --   --  58 61   ALT  --   --   --   --   --   --  17  --   --  20 23   AST  --   --   --   --   --   --  10  --   --  12 17   LIPASE  --   --   --   --   --   --   --   --   --   --  38*    < > = values in this interval not displayed.     Recent Results (from the past 24 hour(s))   Echo Complete   Result Value    LVEF  50-55% (borderline)    [facility]    [id number]  [id number]  [id number]  [id number]^ELLA^LUKE     [facility],[place]  Echocardiography Laboratory  [address] [place] [id number]     Name: SARA HASSAN  MRN: [id number]  : 1970  Study Date: 05/10/2022 12:43 PM  Age: 51 yrs  Gender: Male  [place] [facility] [place] Study: Endocarditis  Ordering Physician: GUZMAN JARAMILLO  Performed By: Rigo Rodas RDCS     BSA: 2.0 m2  Height: 71 in  Weight: 180 lb  BP: 120/76 mmHg  ______________________________________________________________________________  Procedure  Echocardiogram with two-dimensional, color and spectral Doppler performed.  ______________________________________________________________________________  Interpretation Summary  Left ventricular function is decreased. The ejection fraction is 50-55%  (borderline).  Global right ventricular function is normal.  No significant valvular abnoramlities present. No obvious valvular vegetations  present.  Dilation of the inferior vena cava is present with normal respiratory  variation in diameter.  No pericardial effusion is present.     ______________________________________________________________________________  Left Ventricle  Left ventricular size is normal. Left ventricular wall thickness is normal.  Left ventricular function is decreased. The ejection fraction is 50-55%  (borderline). Mild diffuse hypokinesis is present.     Right Ventricle  The right ventricle is normal size. Global right ventricular function is  normal.     Atria  Both atria appear normal.     Mitral Valve  The mitral valve is  normal.     Aortic Valve  Aortic valve is normal in structure and function.     Tricuspid Valve  The tricuspid valve is normal. The peak velocity of the tricuspid regurgitant  jet is not obtainable. Pulmonary artery systolic pressure cannot be assessed.     Pulmonic Valve  On Doppler interrogation, there is no significant stenosis or regurgitation.     Vessels  The aorta root is normal. Dilation of the inferior vena cava is present with  normal respiratory variation in diameter.     Pericardium  No pericardial effusion is present.     Compared to Previous Study  This study was compared with the study from 1.9.22 . No significant changes  noted.  ______________________________________________________________________________  MMode/2D Measurements & Calculations  IVSd: 0.70 cm  LVIDd: 5.1 cm  LVIDs: 4.2 cm  LVPWd: 0.77 cm  FS: 17.5 %  LV mass(C)d: 126.5 grams  LV mass(C)dI: 62.7 grams/m2  LVOT diam: 2.2 cm  LVOT area: 3.8 cm2  LA Volume (BP): 76.8 ml  LA Volume Index (BP): 38.0 ml/m2  RWT: 0.30     Doppler Measurements & Calculations  MV E max greta: 46.7 cm/sec  MV A max greta: 43.7 cm/sec  MV E/A: 1.1  MV dec slope: 143.0 cm/sec2  MV dec time: 0.33 sec  E/E' avg: 3.9  Lateral E/e': 3.3  Medial E/e': 4.6     ______________________________________________________________________________  Report approved by: Sudhir Valente 05/10/2022 03:40 PM

## 2022-05-11 NOTE — PROGRESS NOTES
Patient has been educated on potential risks of choosing to leave the unit and that the responsibility for patient well-being will belong to the patient. Pt has been informed that admission to hospital is due to need for medical treatment. Education given to the patient on some of the potential risks included but are not limited to:      - lack of access to nursing intervention      - possible missed appointments with MD, therapies, tests      - possible missed medications, antibiotics, management of IV's    Patient Response:Too nice to stay inside    Patient notified staff prior to leaving unit: yes  Coban wrap placed over IV prior to pt leaving unit yes

## 2022-05-11 NOTE — PROGRESS NOTES
This is a recent snapshot of the patient's [facility] medical record.  For current drug dose and complete information and questions, call [phone] or In Basket pool, [facility] home infusion ([id number])  CSN Number:  [id number]

## 2022-05-11 NOTE — PHARMACY-VANCOMYCIN DOSING SERVICE
Pharmacy Vancomycin Initial Note  Date of Service May 11, 2022  Patient's  1970  51 year old, male    Indication: Bacteremia    Current estimated CrCl = Estimated Creatinine Clearance: 168.9 mL/min (A) (based on SCr of 0.61 mg/dL (L)).    Creatinine for last 3 days  2022: 10:08 AM Creatinine 0.62 mg/dL  5/10/2022:  6:19 AM Creatinine 0.59 mg/dL  2022:  7:47 AM Creatinine 0.61 mg/dL    Recent Vancomycin Level(s) for last 3 days  No results found for requested labs within last 72 hours.      Vancomycin IV Administrations (past 72 hours)      No vancomycin orders with administrations in past 72 hours.                Nephrotoxins and other renal medications (From now, onward)    Start     Dose/Rate Route Frequency Ordered Stop    22 1030  vancomycin 1250 mg in 0.9% NaCl 250 mL intermittent infusion 1,250 mg         1,250 mg  over 120 Minutes Intravenous EVERY 12 HOURS 22 1015            Contrast Orders - past 72 hours (72h ago, onward)    None          [employer] Prediction of Planned Initial Vancomycin Regimen  Loading dose: N/A  Regimen: 1250 mg IV every 12 hours.  Start time: 07:30 on 2022  Exposure target: AUC24 (range)400-600 mg/L.hr   AUC24,ss: 464 mg/L.hr  Probability of AUC24 > 400: 65 %  Ctrough,ss: 14.2 mg/L  Probability of Ctrough,ss > 20: 25 %  Probability of nephrotoxicity (Lodise CARMELA ): 9 %          Plan:  1. Start vancomycin  1250 mg IV q12h infused over 2 hours.   2. Vancomycin monitoring method: AUC  3. Vancomycin therapeutic monitoring goal: 400-600 mg*h/L  4. Pharmacy will check vancomycin levels as appropriate in 1-3 Days.    5. Serum creatinine levels will be ordered daily for the first week of therapy and at least twice weekly for subsequent weeks.      Thank you,  María Murdock, [facility]

## 2022-05-11 NOTE — PLAN OF CARE
"Goal Outcome Evaluation:    Plan of Care Reviewed With: patient     Overall Patient Progress: improving     Reports pain is managed with po oxycodone and fentanyl patch. Urinal voiding and reporting output. Fair appetite on regular diet. Tolerating PPN. Refuses second PIV so PPN is being stopped for IV abx. Ambulating halls independently. Nicotine and fentanyl patch in place. Gtube venting to gravity with small green,brown output. Afebrile today, vitals stable. BP (!) 179/117 (BP Location: Right leg)   Pulse 75   Temp 98.3  F (36.8  C) (Oral)   Resp 18   Ht 1.803 m (5' 11\")   Wt 81.5 kg (179 lb 9.6 oz)   SpO2 99%   BMI 25.05 kg/m        Premeds with benadryl prior to vanco.  "

## 2022-05-11 NOTE — PROGRESS NOTES
General ID [facility] Service: Follow-up Note      Patient:  Parker Acevedo, Date of birth 1970, Medical record number [id number]  Date of Visit:  May 9, 2022  Reason for consult: CLABSI         Assessment and Recommendations:     ID Problem list:  1. CLABSI with Staph epidermidis (MSSE) bacteremia and now also Staph haemolyticus bacteremia  2. Fevers/chills  3. History of short gut syndrome, on TPN  4. History of prior CLABSI-related bloodstream infections  5. Right subclavian vein thrombus    Recs:  1. Would change to IV vancomycin given the Staph haemolyticus has returned oxacillin-resistant  2. Agree with line holiday (with ideally 72hrs clear cultures); follow up repeat blood cultures to ensure clearance (currently all positive through 5/8)        Discussion:  Parker is a 51M with history of bariatric surgery c/b short gut syndrome requiring TPN, and history of prior CLABSI-related bloodstream infections (follows in ID clinic with Dr. Buckner), who was admitted 5/7 for fevers/chills at home and found to have MSSE bacteremia. To date, all blood cultures from 5/5-5/8 are positive for Staph epidermidis (MSSE) and now also growing Staph haemolyticus (susceptibility is oxacillin-resistant, vancomycin-susceptible), and repeat blood cultures from 5/9-5/10 currently no growth. He is now s/p IR CVC removal on 5/9. Would follow up pending blood cultures to ensure clearance. Would recommend line holiday until blood cultures clear (with ideally 72hrs of negative cultures prior to CVC placement). If blood cultures positive after line removal, then in that case may have to consider extended IV antibiotic course.      Thank you for allowing us to participate in the care of this patient. ID will continue to follow.     Attestation:  Koko Gustafson MD  Infectious Diseases     05/11/2022            Interval History:     Patient with no fevers today, denies pain at the site of the prior CVC removal. GI symptoms stable. He  says vancomycin had been hard on his veins previously, but that he tolerates it if they run it in slow.         Review of Systems:   8 point ROS obtained, pertinent positives and negatives as above.          Current Antimicrobials   IV cefazolin         Physical Exam:   Ranges for vital signs:  Temp:  [97.8  F (36.6  C)-98.3  F (36.8  C)] 98.3  F (36.8  C)  Pulse:  [64-82] 75  Resp:  [18] 18  BP: (179)/(117) 179/117  SpO2:  [96 %-99 %] 99 %    Intake/Output Summary (Last 24 hours) at 5/9/2022 1411  Last data filed at 5/9/2022 1004  Gross per 24 hour   Intake 841.8 ml   Output 0 ml   Net 841.8 ml     Exam:  GENERAL:  Sitting up in bed in no acute distress.   ENT:  Head is normocephalic, atraumatic. Oropharynx is moist.  EYES:  Eyes have anicteric sclerae.    LUNGS:  Unlabored breathing on room air  CARDIOVASCULAR:  Regular rate.   ABDOMEN:  Non-distended, soft, nontender.  EXT: Extremities warm and well perfused.  +RUE swelling.  SKIN:  Right chest prior CVC bandaged without surrounding erythema or tenderness on palpation.  NEUROLOGIC:  Awake, alert, interactive.         Laboratory Data:       Inflammatory Markers    Recent Labs   Lab Test 05/07/22  1423 03/15/22  1442 02/23/22  1621 01/08/22  1430 11/16/21  1300 08/24/21  0855 07/13/21  1110 06/29/21  1016 11/17/20  1445 07/28/20  1607 06/28/19  1345 05/14/19  1145 02/12/18  1400 01/23/18  0845 01/20/18  1030 10/02/17  1030 09/24/17  1900 06/29/17  1009 06/28/17  2222 03/12/17  0351   SED  --   --  10  --   --   --   --   --   --  10  --  13  --  10 11  --  31*  --  26* 17*   CRP 34.0* <2.9 <2.9 52.0* <2.9 <2.9 7.8 <2.9   < > <2.9   < >  --    < > <2.9 5.4   < > 26.6*   < > 53.0* 3.4    < > = values in this interval not displayed.       Hematology Studies    Recent Labs   Lab Test 05/11/22  0747 05/10/22  0619 05/08/22  1003 05/07/22  1423 05/05/22  1245 04/26/22  1310 04/12/22  1030 07/13/21  1110 06/29/21  1016 06/14/21  1017 06/09/21  1044 06/01/21  1117  05/25/21  1147 05/19/21  1342 05/18/21  1015   WBC 6.5  --  5.1 7.6 7.0 6.6 5.2   < > 6.9 8.1 7.5 7.3   < > 6.1 6.2   ANEU  --   --   --   --   --   --   --   --  3.1 4.1 4.7 4.3  --  3.5 3.7   AEOS  --   --   --   --   --   --   --   --  0.2 0.2 0.3 0.1  --  0.2 0.2   HGB 12.1*  --  11.1* 11.1* 11.2* 13.1* 10.3*   < > 12.1* 12.0* 13.3 12.1*   < > 12.5* 12.6*   MCV 96  --  95 98 96 93 96   < > 96 97 96 94   < > 97 98    174 149* 138* 146* 242 160   < > 178 158 169 174   < > 159 145*    < > = values in this interval not displayed.       Metabolic Studies     Recent Labs   Lab Test 05/11/22  0747 05/10/22  0619 05/09/22  1008 05/08/22  0643 05/07/22  1423    143 143 143 143   POTASSIUM 3.9 3.8 3.6 3.6 3.6   CHLORIDE 107 109 109 109 106   CO2 29 30 29 29 27   BUN 18 14 10 8 12   CR 0.61* 0.59* 0.62* 0.64* 0.61*  0.61*   GFRESTIMATED >90 >90 >90 >90 >90  >90       Hepatic Studies    Recent Labs   Lab Test 05/09/22  1008 05/08/22  0643 05/07/22  1423 04/26/22  1310 04/12/22  1030 04/06/22  1245   BILITOTAL 0.4 0.6 0.7 0.4  0.4 0.3  0.3 0.5  0.5   ALKPHOS 58 58 61 62 47 48   ALBUMIN 3.2* 3.3* 3.5 3.3* 3.1* 3.2*   AST 10 12 17 14 14 14   ALT 17 20 23 36 23 36       Microbiology:  Culture   Date Value Ref Range Status   05/10/2022 No growth after 1 day  Preliminary   05/10/2022 No growth after 1 day  Preliminary   05/09/2022 No growth after 1 day  Preliminary   05/09/2022 No growth after 1 day  Preliminary   05/08/2022 Positive on the 1st day of incubation (A)  Preliminary   05/08/2022 Staphylococcus haemolyticus (AA)  Preliminary     Comment:     1 of 2 bottles  Susceptibilities done on previous cultures   05/08/2022 Positive on the 1st day of incubation (A)  Preliminary   05/08/2022 Staphylococcus haemolyticus (AA)  Preliminary     Comment:     Susceptibilities done on previous cultures   05/08/2022 Staphylococcus epidermidis (AA)  Preliminary     Comment:     Susceptibilities done on previous cultures    05/07/2022 Positive on the 1st day of incubation (A)  Final   05/07/2022 Staphylococcus epidermidis (AA)  Final     Comment:     2 of 2 bottles  Susceptibilities done on previous cultures   05/07/2022 Positive on the 1st day of incubation (A)  Final   05/07/2022 Staphylococcus epidermidis (AA)  Final     Comment:     2 of 2 bottles  Susceptibilities done on previous cultures   05/07/2022 Staphylococcus haemolyticus (AA)  Final     Comment:     1 of 2 bottles   05/07/2022 No growth after 3 days  Preliminary   05/05/2022 Positive on the 1st day of incubation (A)  Final   05/05/2022 Staphylococcus epidermidis (AA)  Final     Comment:     Susceptibilities done on previous cultures   05/05/2022 Positive on the 1st day of incubation (A)  Final   05/05/2022 Staphylococcus epidermidis (AA)  Final     Comment:     2 of 2 bottles   05/05/2022 Positive on the 1st day of incubation (A)  Final   05/05/2022 Staphylococcus epidermidis (AA)  Final     Comment:     2 of 2 bottles  Susceptibilities done on previous cultures   04/06/2022 No Growth  Final   04/06/2022 No Growth  Final   04/06/2022 No Growth  Final   02/24/2022 No Growth  Final   02/23/2022 No Growth  Final   02/23/2022 No Growth  Final   01/13/2022 No Growth  Final   01/13/2022 No Growth  Final   01/11/2022 No Growth  Final   01/11/2022 No Growth  Final   01/10/2022 <15 CFU Staphylococcus epidermidis (A)  Final     Comment:     Susceptibilities not routinely done   01/10/2022 Positive on the 2nd day of incubation (A)  Final   01/10/2022 Staphylococcus hominis (AA)  Final     Comment:     1 of 2 bottles  Susceptibilities done on previous cultures   01/10/2022 Positive on the 3rd day of incubation (A)  Final   01/10/2022 Staphylococcus hominis (AA)  Final     Comment:     1 of 2 bottles  Susceptibilities done on previous cultures   01/09/2022 Positive on the 2nd day of incubation (A)  Final   01/09/2022 Staphylococcus epidermidis (AA)  Final     Comment:     1 of 2  bottles     2022 Staphylococcus epidermidis (AA)  Final     Comment:     1 of 2 bottles   2022 No Growth  Final   2022 Positive on the 1st day of incubation (A)  Final   2022 Staphylococcus epidermidis (AA)  Final     Comment:     2 of 2 bottles  Susceptibilities done on previous cultures   2022 Positive on the 1st day of incubation (A)  Final   2022 Staphylococcus epidermidis (AA)  Final     Comment:     2 of 2 bottles  Susceptibilities done on previous cultures   2022 Staphylococcus hominis (AA)  Final     Comment:     2 of 2 bottles  Susceptibilities done on previous cultures   2022 Positive on the 1st day of incubation (A)  Final   2022 Staphylococcus epidermidis (AA)  Final     Comment:     2 of 2 bottles   2022 Staphylococcus hominis (AA)  Final     Comment:     1 of 2 bottles   2022 Positive on the 1st day of incubation (A)  Final   2022 Staphylococcus hominis (AA)  Final     Comment:     2 of 2 bottles  Susceptibilities done on previous cultures   2022 Staphylococcus epidermidis (AA)  Final     Comment:     2 of 2 bottles  Susceptibilities done on previous cultures   2021 No Growth  Final   2021 No Growth  Final   2021 No Growth  Final   2021 No Growth  Final   2021 No Growth  Final   2021 No Growth  Final   2021 No Growth  Final   2021 Positive on the 1st day of incubation (A)  Final   2021 Pseudomonas oryzihabitans (AA)  Final   2021 Pseudomonas oryzihabitans (AA)  Final     Comment:     1 of 2 bottles  Strain 2   08/10/2021 No Growth  Final   08/10/2021 No Growth  Final   08/10/2021 Positive on the 1st day of incubation (A)  Final   08/10/2021 Staphylococcus epidermidis (AA)  Final     Comment:     1 of 2 bottles            Imagin/7/22 RUE ultrasound read:  IMPRESSION:  1. Partially occlusive thrombus in the right subclavian vein.  2. Previously seen thrombus in  the right internal jugular and  innominate vein is not seen on today's exam, compared to prior  ultrasound 2/25/2022.

## 2022-05-12 ENCOUNTER — HOME INFUSION (PRE-WILLOW HOME INFUSION) (OUTPATIENT)
Dept: PHARMACY | Facility: CLINIC | Age: 52
End: 2022-05-12

## 2022-05-12 ENCOUNTER — APPOINTMENT (OUTPATIENT)
Dept: INTERVENTIONAL RADIOLOGY/VASCULAR | Facility: CLINIC | Age: 52
DRG: 315 | End: 2022-05-12
Attending: NURSE PRACTITIONER
Payer: COMMERCIAL

## 2022-05-12 VITALS
HEIGHT: 71 IN | HEART RATE: 75 BPM | DIASTOLIC BLOOD PRESSURE: 67 MMHG | RESPIRATION RATE: 16 BRPM | WEIGHT: 179.6 LBS | SYSTOLIC BLOOD PRESSURE: 122 MMHG | OXYGEN SATURATION: 100 % | TEMPERATURE: 99.1 F | BODY MASS INDEX: 25.15 KG/M2

## 2022-05-12 LAB
ANION GAP SERPL CALCULATED.3IONS-SCNC: 4 MMOL/L (ref 3–14)
BACTERIA BLD CULT: NO GROWTH
BUN SERPL-MCNC: 17 MG/DL (ref 7–30)
CALCIUM SERPL-MCNC: 8.4 MG/DL (ref 8.5–10.1)
CHLORIDE BLD-SCNC: 108 MMOL/L (ref 94–109)
CO2 SERPL-SCNC: 32 MMOL/L (ref 20–32)
CREAT SERPL-MCNC: 0.69 MG/DL (ref 0.66–1.25)
CREAT SERPL-MCNC: 0.69 MG/DL (ref 0.66–1.25)
ERYTHROCYTE [DISTWIDTH] IN BLOOD BY AUTOMATED COUNT: 14.3 % (ref 10–15)
GFR SERPL CREATININE-BSD FRML MDRD: >90 ML/MIN/1.73M2
GFR SERPL CREATININE-BSD FRML MDRD: >90 ML/MIN/1.73M2
GLUCOSE BLD-MCNC: 71 MG/DL (ref 70–99)
HCT VFR BLD AUTO: 37.3 % (ref 40–53)
HGB BLD-MCNC: 11.6 G/DL (ref 13.3–17.7)
MAGNESIUM SERPL-MCNC: 2.2 MG/DL (ref 1.6–2.3)
MCH RBC QN AUTO: 30.2 PG (ref 26.5–33)
MCHC RBC AUTO-ENTMCNC: 31.1 G/DL (ref 31.5–36.5)
MCV RBC AUTO: 97 FL (ref 78–100)
PHOSPHATE SERPL-MCNC: 3.8 MG/DL (ref 2.5–4.5)
PLATELET # BLD AUTO: 198 10E3/UL (ref 150–450)
POTASSIUM BLD-SCNC: 3.8 MMOL/L (ref 3.4–5.3)
RBC # BLD AUTO: 3.84 10E6/UL (ref 4.4–5.9)
SODIUM SERPL-SCNC: 144 MMOL/L (ref 133–144)
VANCOMYCIN SERPL-MCNC: 8.6 MG/L
WBC # BLD AUTO: 5.9 10E3/UL (ref 4–11)

## 2022-05-12 PROCEDURE — 99152 MOD SED SAME PHYS/QHP 5/>YRS: CPT | Performed by: RADIOLOGY

## 2022-05-12 PROCEDURE — 99239 HOSP IP/OBS DSCHRG MGMT >30: CPT | Performed by: INTERNAL MEDICINE

## 2022-05-12 PROCEDURE — 85041 AUTOMATED RBC COUNT: CPT | Performed by: INTERNAL MEDICINE

## 2022-05-12 PROCEDURE — 99152 MOD SED SAME PHYS/QHP 5/>YRS: CPT

## 2022-05-12 PROCEDURE — 76937 US GUIDE VASCULAR ACCESS: CPT | Mod: 26 | Performed by: RADIOLOGY

## 2022-05-12 PROCEDURE — 250N000011 HC RX IP 250 OP 636

## 2022-05-12 PROCEDURE — 76937 US GUIDE VASCULAR ACCESS: CPT

## 2022-05-12 PROCEDURE — 250N000013 HC RX MED GY IP 250 OP 250 PS 637: Performed by: HOSPITALIST

## 2022-05-12 PROCEDURE — 84100 ASSAY OF PHOSPHORUS: CPT | Performed by: STUDENT IN AN ORGANIZED HEALTH CARE EDUCATION/TRAINING PROGRAM

## 2022-05-12 PROCEDURE — 250N000011 HC RX IP 250 OP 636: Performed by: RADIOLOGY

## 2022-05-12 PROCEDURE — C1769 GUIDE WIRE: HCPCS

## 2022-05-12 PROCEDURE — 0JH63XZ INSERTION OF TUNNELED VASCULAR ACCESS DEVICE INTO CHEST SUBCUTANEOUS TISSUE AND FASCIA, PERCUTANEOUS APPROACH: ICD-10-PCS | Performed by: RADIOLOGY

## 2022-05-12 PROCEDURE — 82565 ASSAY OF CREATININE: CPT | Performed by: INTERNAL MEDICINE

## 2022-05-12 PROCEDURE — 250N000013 HC RX MED GY IP 250 OP 250 PS 637

## 2022-05-12 PROCEDURE — 36558 INSERT TUNNELED CV CATH: CPT | Performed by: RADIOLOGY

## 2022-05-12 PROCEDURE — 999N000128 HC STATISTIC PERIPHERAL IV START W/O US GUIDANCE

## 2022-05-12 PROCEDURE — 250N000011 HC RX IP 250 OP 636: Performed by: INTERNAL MEDICINE

## 2022-05-12 PROCEDURE — 82947 ASSAY GLUCOSE BLOOD QUANT: CPT | Performed by: STUDENT IN AN ORGANIZED HEALTH CARE EDUCATION/TRAINING PROGRAM

## 2022-05-12 PROCEDURE — 272N000504 HC NEEDLE CR4

## 2022-05-12 PROCEDURE — 80202 ASSAY OF VANCOMYCIN: CPT | Performed by: INTERNAL MEDICINE

## 2022-05-12 PROCEDURE — 36415 COLL VENOUS BLD VENIPUNCTURE: CPT | Performed by: INTERNAL MEDICINE

## 2022-05-12 PROCEDURE — 99232 SBSQ HOSP IP/OBS MODERATE 35: CPT | Mod: 24 | Performed by: INTERNAL MEDICINE

## 2022-05-12 PROCEDURE — 258N000003 HC RX IP 258 OP 636

## 2022-05-12 PROCEDURE — 02HV33Z INSERTION OF INFUSION DEVICE INTO SUPERIOR VENA CAVA, PERCUTANEOUS APPROACH: ICD-10-PCS | Performed by: RADIOLOGY

## 2022-05-12 PROCEDURE — 36558 INSERT TUNNELED CV CATH: CPT

## 2022-05-12 PROCEDURE — 250N000009 HC RX 250

## 2022-05-12 PROCEDURE — 77001 FLUOROGUIDE FOR VEIN DEVICE: CPT | Mod: 26 | Performed by: RADIOLOGY

## 2022-05-12 PROCEDURE — 87040 BLOOD CULTURE FOR BACTERIA: CPT | Performed by: STUDENT IN AN ORGANIZED HEALTH CARE EDUCATION/TRAINING PROGRAM

## 2022-05-12 PROCEDURE — 83735 ASSAY OF MAGNESIUM: CPT | Performed by: STUDENT IN AN ORGANIZED HEALTH CARE EDUCATION/TRAINING PROGRAM

## 2022-05-12 PROCEDURE — 250N000013 HC RX MED GY IP 250 OP 250 PS 637: Performed by: STUDENT IN AN ORGANIZED HEALTH CARE EDUCATION/TRAINING PROGRAM

## 2022-05-12 PROCEDURE — 272N000602 HC WOUND GLUE CR1

## 2022-05-12 PROCEDURE — 36415 COLL VENOUS BLD VENIPUNCTURE: CPT | Performed by: STUDENT IN AN ORGANIZED HEALTH CARE EDUCATION/TRAINING PROGRAM

## 2022-05-12 PROCEDURE — C1751 CATH, INF, PER/CENT/MIDLINE: HCPCS

## 2022-05-12 PROCEDURE — 258N000003 HC RX IP 258 OP 636: Performed by: INTERNAL MEDICINE

## 2022-05-12 RX ORDER — NALOXONE HYDROCHLORIDE 0.4 MG/ML
0.2 INJECTION, SOLUTION INTRAMUSCULAR; INTRAVENOUS; SUBCUTANEOUS
Status: DISCONTINUED | OUTPATIENT
Start: 2022-05-12 | End: 2022-05-12 | Stop reason: HOSPADM

## 2022-05-12 RX ORDER — ONDANSETRON 2 MG/ML
4 INJECTION INTRAMUSCULAR; INTRAVENOUS ONCE
Status: COMPLETED | OUTPATIENT
Start: 2022-05-12 | End: 2022-05-12

## 2022-05-12 RX ORDER — DIPHENHYDRAMINE HCL 25 MG
25 TABLET ORAL 2 TIMES DAILY
Qty: 22 TABLET | Refills: 0 | Status: SHIPPED | OUTPATIENT
Start: 2022-05-12 | End: 2022-05-23

## 2022-05-12 RX ORDER — FLUMAZENIL 0.1 MG/ML
0.2 INJECTION, SOLUTION INTRAVENOUS
Status: DISCONTINUED | OUTPATIENT
Start: 2022-05-12 | End: 2022-05-12 | Stop reason: HOSPADM

## 2022-05-12 RX ORDER — ENOXAPARIN SODIUM 150 MG/ML
120 INJECTION SUBCUTANEOUS DAILY
Qty: 24 ML | Refills: 0 | Status: SHIPPED | OUTPATIENT
Start: 2022-05-12 | End: 2022-06-01

## 2022-05-12 RX ORDER — NALOXONE HYDROCHLORIDE 0.4 MG/ML
0.4 INJECTION, SOLUTION INTRAMUSCULAR; INTRAVENOUS; SUBCUTANEOUS
Status: DISCONTINUED | OUTPATIENT
Start: 2022-05-12 | End: 2022-05-12 | Stop reason: HOSPADM

## 2022-05-12 RX ORDER — ENOXAPARIN SODIUM 150 MG/ML
120 INJECTION SUBCUTANEOUS DAILY
Qty: 24 ML | Refills: 0 | Status: CANCELLED | OUTPATIENT
Start: 2022-05-12 | End: 2022-06-11

## 2022-05-12 RX ORDER — CEFAZOLIN SODIUM 1 G/50ML
1250 SOLUTION INTRAVENOUS EVERY 12 HOURS
COMMUNITY
Start: 2022-05-12 | End: 2022-05-12

## 2022-05-12 RX ORDER — FENTANYL CITRATE 50 UG/ML
25-50 INJECTION, SOLUTION INTRAMUSCULAR; INTRAVENOUS EVERY 5 MIN PRN
Status: DISCONTINUED | OUTPATIENT
Start: 2022-05-12 | End: 2022-05-12 | Stop reason: HOSPADM

## 2022-05-12 RX ORDER — HEPARIN SODIUM,PORCINE 10 UNIT/ML
5 VIAL (ML) INTRAVENOUS
Status: COMPLETED | OUTPATIENT
Start: 2022-05-12 | End: 2022-05-12

## 2022-05-12 RX ADMIN — OXYCODONE HYDROCHLORIDE 10 MG: 5 SOLUTION ORAL at 16:15

## 2022-05-12 RX ADMIN — MIDAZOLAM 1 MG: 1 INJECTION INTRAMUSCULAR; INTRAVENOUS at 14:18

## 2022-05-12 RX ADMIN — PANTOPRAZOLE SODIUM 40 MG: 40 TABLET, DELAYED RELEASE ORAL at 08:17

## 2022-05-12 RX ADMIN — OXYCODONE HYDROCHLORIDE 10 MG: 5 SOLUTION ORAL at 10:06

## 2022-05-12 RX ADMIN — CARVEDILOL 12.5 MG: 12.5 TABLET, FILM COATED ORAL at 08:17

## 2022-05-12 RX ADMIN — ONDANSETRON 4 MG: 4 TABLET, ORALLY DISINTEGRATING ORAL at 04:14

## 2022-05-12 RX ADMIN — DIPHENHYDRAMINE HYDROCHLORIDE 25 MG: 50 INJECTION, SOLUTION INTRAMUSCULAR; INTRAVENOUS at 11:40

## 2022-05-12 RX ADMIN — ONDANSETRON 4 MG: 2 INJECTION INTRAMUSCULAR; INTRAVENOUS at 14:26

## 2022-05-12 RX ADMIN — DIPHENHYDRAMINE HYDROCHLORIDE 25 MG: 50 INJECTION, SOLUTION INTRAMUSCULAR; INTRAVENOUS at 00:32

## 2022-05-12 RX ADMIN — ENOXAPARIN SODIUM 120 MG: 120 INJECTION SUBCUTANEOUS at 16:15

## 2022-05-12 RX ADMIN — VANCOMYCIN HYDROCHLORIDE 1250 MG: 500 INJECTION, POWDER, LYOPHILIZED, FOR SOLUTION INTRAVENOUS at 12:42

## 2022-05-12 RX ADMIN — DEXTROAMPHETAMINE SACCHARATE, AMPHETAMINE ASPARTATE, DEXTROAMPHETAMINE SULFATE AND AMPHETAMINE SULFATE 20 MG: 2.5; 2.5; 2.5; 2.5 TABLET ORAL at 08:17

## 2022-05-12 RX ADMIN — ONDANSETRON 4 MG: 4 TABLET, ORALLY DISINTEGRATING ORAL at 10:06

## 2022-05-12 RX ADMIN — VANCOMYCIN HYDROCHLORIDE 1250 MG: 500 INJECTION, POWDER, LYOPHILIZED, FOR SOLUTION INTRAVENOUS at 01:11

## 2022-05-12 RX ADMIN — FENTANYL CITRATE 50 MCG: 50 INJECTION, SOLUTION INTRAMUSCULAR; INTRAVENOUS at 14:22

## 2022-05-12 RX ADMIN — Medication 5 ML: at 14:31

## 2022-05-12 RX ADMIN — OXYCODONE HYDROCHLORIDE 10 MG: 5 SOLUTION ORAL at 04:14

## 2022-05-12 RX ADMIN — FENTANYL CITRATE 50 MCG: 50 INJECTION, SOLUTION INTRAMUSCULAR; INTRAVENOUS at 14:18

## 2022-05-12 RX ADMIN — MIDAZOLAM 1 MG: 1 INJECTION INTRAMUSCULAR; INTRAVENOUS at 14:23

## 2022-05-12 RX ADMIN — LIDOCAINE HYDROCHLORIDE 5 ML: 10 INJECTION, SOLUTION EPIDURAL; INFILTRATION; INTRACAUDAL; PERINEURAL at 14:19

## 2022-05-12 ASSESSMENT — ACTIVITIES OF DAILY LIVING (ADL)
ADLS_ACUITY_SCORE: 20

## 2022-05-12 NOTE — PROCEDURES
[facility]    Procedure: RIJV tunneled CVC placement    Date/Time: 5/12/2022 2:42 PM  Performed by: Yaya Thomas MD  Authorized by: Yaya Thomas MD       UNIVERSAL PROTOCOL   Site Marked: NA  Prior Images Obtained and Reviewed:  Yes  Required items: Required blood products, implants, devices and special equipment available    Patient identity confirmed:  Verbally with patient, arm band, provided demographic data and hospital-assigned identification number  Patient was reevaluated immediately before administering moderate or deep sedation or anesthesia  Confirmation Checklist:  Patient's identity using two indicators, relevant allergies, procedure was appropriate and matched the consent or emergent situation and correct equipment/implants were available  Time out: Immediately prior to the procedure a time out was called    Universal Protocol: the Joint Commission Universal Protocol was followed    Preparation: Patient was prepped and draped in usual sterile fashion       ANESTHESIA    Anesthesia: Local infiltration  Local Anesthetic:  Lidocaine 1% without epinephrine    See dictated procedure note for full details.  Findings: 9.5F x 24 cm tip to cuff DL tunneled CVC via RIJV with tip in SVC. Each lumen hep locked with 1:10 heparin, 2.5 ml. Catheter can be used immediately.    Specimens: none    Complications: None    Condition: Stable      PROCEDURE    Patient Tolerance:  Patient tolerated the procedure well with no immediate complications  Length of time physician/provider present for 1:1 monitoring during sedation: 0

## 2022-05-12 NOTE — PLAN OF CARE
Assumed nursing care from [id number]. Patient alert, oriented x 4, and up independently. Vital signs stable. Chronic pain managed with PRN oxycodone and fentanyl patches. Intermittent nausea; PRN Zofran effective. PPN infusing continuously in (L) upper arm PIV; refused lipids this shift. G-tube to gravity drainage with moderate amounts of brownish-yellow fluid; patient self-manages. Voiding spontaneously. CVC to be replaced tomorrow; NPO after midnight tonight.

## 2022-05-12 NOTE — PROGRESS NOTES
General ID Orange Service: Sign-off Note      Patient:  Parker Acevedo, Date of birth 1970, Medical record number [id number]  Date of Visit:  May 9, 2022  Reason for consult: CLABSI         Assessment and Recommendations:     ID Problem list:  1. CLABSI with Staph epidermidis (MSSE) bacteremia and now also Staph haemolyticus bacteremia  2. Fevers/chills  3. History of short gut syndrome, on TPN  4. History of prior CLABSI-related bloodstream infections  5. Right subclavian vein thrombus    Recs:  1. Would continue on IV vancomycin (dosed by pharmacy) given the Staph haemolyticus has returned oxacillin-resistant  2. Given all blood cultures no growth since line removal 5/9, would be reasonable to place CVC (ideally in new site)  3. Continue to follow up repeat blood cultures to ensure clearance (currently all positive through 5/8, but no growth 5/9 onward)    4. See end of note for OPAT antibiotic recommendations  5. Follow up in ID clinic with Dr. Buckner in 2-4 weeks after discharge      Discussion:  Parker is a 51M with history of bariatric surgery c/b short gut syndrome requiring TPN, and history of prior CLABSI-related bloodstream infections (follows in ID clinic with Dr. Buckner), who was admitted 5/7 for fevers/chills at home and found to have MSSE bacteremia. To date, all blood cultures from 5/5-5/8 are positive for Staph epidermidis (MSSE) and now also growing Staph haemolyticus (susceptibility is oxacillin-resistant, vancomycin-susceptible), and repeat blood cultures from 5/9-5/10 currently no growth. He is now s/p IR CVC removal on 5/9. Would follow up pending blood cultures to ensure clearance. Would recommend line holiday until blood cultures clear (with ideally 72hrs of negative cultures prior to CVC placement). If blood cultures positive after line removal, then in that case may have to consider extended IV antibiotic course.      Thank you for allowing us to participate in the care of this  patient. ID will sign off at this time. Can call back if questions.     Attestation:  Koko Gustafson MD  Infectious Diseases     05/12/2022            Interval History:     Patient with no fevers today, denies pain at the site of the prior CVC removal, no discharge. GI symptoms stable. He says vancomycin had been hard on his veins previously, but that he tolerates it if they run it in slow. He hopes to go home today after getting line placed.         Review of Systems:   8 point ROS obtained, pertinent positives and negatives as above.          Current Antimicrobials   IV vancomycin         Physical Exam:   Ranges for vital signs:  Temp:  [98.3  F (36.8  C)-98.8  F (37.1  C)] 98.3  F (36.8  C)  Pulse:  [78-83] 83  Resp:  [16-18] 16  BP: (100-126)/(58-77) 126/77  SpO2:  [98 %-99 %] 99 %    Intake/Output Summary (Last 24 hours) at 5/9/2022 1411  Last data filed at 5/9/2022 1004  Gross per 24 hour   Intake 841.8 ml   Output 0 ml   Net 841.8 ml     Exam:  GENERAL:  Sitting up in bed in no acute distress.   ENT:  Head is normocephalic, atraumatic. Oropharynx is moist.  EYES:  Eyes have anicteric sclerae.    LUNGS:  Unlabored breathing on room air  CARDIOVASCULAR:  Regular rate.   ABDOMEN:  Non-distended, soft, nontender.  EXT: Extremities warm and well perfused.  +RUE swelling.  SKIN:  Right chest prior CVC site healing without surrounding erythema or tenderness on palpation.  NEUROLOGIC:  Awake, alert, interactive.         Laboratory Data:       Inflammatory Markers    Recent Labs   Lab Test 05/07/22  1423 03/15/22  1442 02/23/22  1621 01/08/22  1430 11/16/21  1300 08/24/21  0855 07/13/21  1110 06/29/21  1016 11/17/20  1445 07/28/20  1607 06/28/19  1345 05/14/19  1145 02/12/18  1400 01/23/18  0845 01/20/18  1030 10/02/17  1030 09/24/17  1900 06/29/17  1009 06/28/17  2222 03/12/17  0351   SED  --   --  10  --   --   --   --   --   --  10  --  13  --  10 11  --  31*  --  26* 17*   CRP 34.0* <2.9 <2.9 52.0* <2.9 <2.9  7.8 <2.9   < > <2.9   < >  --    < > <2.9 5.4   < > 26.6*   < > 53.0* 3.4    < > = values in this interval not displayed.       Hematology Studies    Recent Labs   Lab Test 05/12/22  0817 05/11/22  0747 05/10/22  0619 05/08/22  1003 05/07/22  1423 05/05/22  1245 04/26/22  1310 07/13/21  1110 06/29/21  1016 06/14/21  1017 06/09/21  1044 06/01/21  1117 05/25/21  1147 05/19/21  1342 05/18/21  1015   WBC 5.9 6.5  --  5.1 7.6 7.0 6.6   < > 6.9 8.1 7.5 7.3   < > 6.1 6.2   ANEU  --   --   --   --   --   --   --   --  3.1 4.1 4.7 4.3  --  3.5 3.7   AEOS  --   --   --   --   --   --   --   --  0.2 0.2 0.3 0.1  --  0.2 0.2   HGB 11.6* 12.1*  --  11.1* 11.1* 11.2* 13.1*   < > 12.1* 12.0* 13.3 12.1*   < > 12.5* 12.6*   MCV 97 96  --  95 98 96 93   < > 96 97 96 94   < > 97 98    164 174 149* 138* 146* 242   < > 178 158 169 174   < > 159 145*    < > = values in this interval not displayed.       Metabolic Studies     Recent Labs   Lab Test 05/12/22  0817 05/11/22  0747 05/10/22  0619 05/09/22  1008 05/08/22  0643    142 143 143 143   POTASSIUM 3.8 3.9 3.8 3.6 3.6   CHLORIDE 108 107 109 109 109   CO2 32 29 30 29 29   BUN 17 18 14 10 8   CR 0.69  0.69 0.61* 0.59* 0.62* 0.64*   GFRESTIMATED >90  >90 >90 >90 >90 >90       Hepatic Studies    Recent Labs   Lab Test 05/09/22  1008 05/08/22  0643 05/07/22  1423 04/26/22  1310 04/12/22  1030 04/06/22  1245   BILITOTAL 0.4 0.6 0.7 0.4  0.4 0.3  0.3 0.5  0.5   ALKPHOS 58 58 61 62 47 48   ALBUMIN 3.2* 3.3* 3.5 3.3* 3.1* 3.2*   AST 10 12 17 14 14 14   ALT 17 20 23 36 23 36       Microbiology:  Culture   Date Value Ref Range Status   05/11/2022 No growth after 12 hours  Preliminary   05/11/2022 No growth after 1 day  Preliminary   05/10/2022 No growth after 2 days  Preliminary   05/10/2022 No growth after 2 days  Preliminary   05/09/2022 No growth after 2 days  Preliminary   05/09/2022 No growth after 2 days  Preliminary   05/08/2022 Positive on the 1st day of incubation (A)   Preliminary   05/08/2022 Staphylococcus haemolyticus (AA)  Preliminary     Comment:     1 of 2 bottles  Susceptibilities done on previous cultures   05/08/2022 Positive on the 1st day of incubation (A)  Final   05/08/2022 Staphylococcus haemolyticus (AA)  Final     Comment:     Susceptibilities done on previous cultures  2 of 2 bottles   05/08/2022 Staphylococcus epidermidis (AA)  Final     Comment:     Susceptibilities done on previous cultures  2 of 2 bottles   05/07/2022 Positive on the 1st day of incubation (A)  Final   05/07/2022 Staphylococcus epidermidis (AA)  Final     Comment:     2 of 2 bottles  Susceptibilities done on previous cultures   05/07/2022 Positive on the 1st day of incubation (A)  Final   05/07/2022 Staphylococcus epidermidis (AA)  Final     Comment:     2 of 2 bottles  Susceptibilities done on previous cultures   05/07/2022 Staphylococcus haemolyticus (AA)  Final     Comment:     1 of 2 bottles   05/07/2022 No growth after 4 days  Preliminary   05/05/2022 Positive on the 1st day of incubation (A)  Final   05/05/2022 Staphylococcus epidermidis (AA)  Final     Comment:     Susceptibilities done on previous cultures   05/05/2022 Positive on the 1st day of incubation (A)  Final   05/05/2022 Staphylococcus epidermidis (AA)  Final     Comment:     2 of 2 bottles   05/05/2022 Positive on the 1st day of incubation (A)  Final   05/05/2022 Staphylococcus epidermidis (AA)  Final     Comment:     2 of 2 bottles  Susceptibilities done on previous cultures   04/06/2022 No Growth  Final   04/06/2022 No Growth  Final   04/06/2022 No Growth  Final   02/24/2022 No Growth  Final   02/23/2022 No Growth  Final   02/23/2022 No Growth  Final   01/13/2022 No Growth  Final   01/13/2022 No Growth  Final   01/11/2022 No Growth  Final   01/11/2022 No Growth  Final   01/10/2022 <15 CFU Staphylococcus epidermidis (A)  Final     Comment:     Susceptibilities not routinely done   01/10/2022 Positive on the 2nd day of  incubation (A)  Final   01/10/2022 Staphylococcus hominis (AA)  Final     Comment:     1 of 2 bottles  Susceptibilities done on previous cultures   01/10/2022 Positive on the 3rd day of incubation (A)  Final   01/10/2022 Staphylococcus hominis (AA)  Final     Comment:     1 of 2 bottles  Susceptibilities done on previous cultures   01/09/2022 Positive on the 2nd day of incubation (A)  Final   01/09/2022 Staphylococcus epidermidis (AA)  Final     Comment:     1 of 2 bottles     01/09/2022 Staphylococcus epidermidis (AA)  Final     Comment:     1 of 2 bottles   01/09/2022 No Growth  Final   01/08/2022 Positive on the 1st day of incubation (A)  Final   01/08/2022 Staphylococcus epidermidis (AA)  Final     Comment:     2 of 2 bottles  Susceptibilities done on previous cultures   01/08/2022 Positive on the 1st day of incubation (A)  Final   01/08/2022 Staphylococcus epidermidis (AA)  Final     Comment:     2 of 2 bottles  Susceptibilities done on previous cultures   01/08/2022 Staphylococcus hominis (AA)  Final     Comment:     2 of 2 bottles  Susceptibilities done on previous cultures   01/06/2022 Positive on the 1st day of incubation (A)  Final   01/06/2022 Staphylococcus epidermidis (AA)  Final     Comment:     2 of 2 bottles   01/06/2022 Staphylococcus hominis (AA)  Final     Comment:     1 of 2 bottles   01/06/2022 Positive on the 1st day of incubation (A)  Final   01/06/2022 Staphylococcus hominis (AA)  Final     Comment:     2 of 2 bottles  Susceptibilities done on previous cultures   01/06/2022 Staphylococcus epidermidis (AA)  Final     Comment:     2 of 2 bottles  Susceptibilities done on previous cultures   09/01/2021 No Growth  Final   09/01/2021 No Growth  Final   09/01/2021 No Growth  Final   08/16/2021 No Growth  Final   08/16/2021 No Growth  Final   08/16/2021 No Growth  Final   08/14/2021 No Growth  Final   08/14/2021 Positive on the 1st day of incubation (A)  Final   08/14/2021 Pseudomonas oryzihabitans  (AA)  Final   2021 Pseudomonas oryzihabitans (AA)  Final     Comment:     1 of 2 bottles  Strain 2   08/10/2021 No Growth  Final   08/10/2021 No Growth  Final   08/10/2021 Positive on the 1st day of incubation (A)  Final   08/10/2021 Staphylococcus epidermidis (AA)  Final     Comment:     1 of 2 bottles            Imagin/7/22 RUE ultrasound read:  IMPRESSION:  1. Partially occlusive thrombus in the right subclavian vein.  2. Previously seen thrombus in the right internal jugular and  innominate vein is not seen on today's exam, compared to prior  ultrasound 2022.          Prolonged Parenteral/Oral Antibiotic Recommendations and ID Follow up  This template provides final ID recommendations as of this date. If there are clinical changes or questions please call the ID team.     Infectious Diseases Diagnosis/es: CLABSI    IV antibiotics: Yes    Antibiotic Information  Name of Antibiotic Dose of Antibiotic1 Pharmacy to assist with dosing Y/N Anticipated duration Effective start date2 End date   IV vancomycin Dose by level (currently 1250mg q12 Y 14-days tentatively (tbd in ID clinic)  (final duration TBD in ID follow up)                   1.Dose of antibiotic will need to be renally adjusted if creatinine clearance changes  2.Effective start date is the date of therapy with appropriate spectrum    Method of antibiotic delivery:To be determined. At the end of therapy should the line be removed? No.     Tentative plans for disposition: Home    Weekly labs required: CBC with diff, CMP and Vancomycin level. Dr. Buckner will follow labs at discharge until ID follow up. Please have labs faxed to ID clinic.    Appointment to be scheduled: Within 2-4 weeks of discharge if able.  Appointment will be scheduled with: Dr. Buckner. Routine hospital follow up appointments are 30 minutes. If 60 minutes is necessary due to complexity of case indicate that a 60 min appointment is required. If the patient remains  in the hospital at this date please re-consult ID.     ID provider to route this note to the appropriate Epic pools: [facility] INFECTIOUS DISEASE ADULT [facility], PANDA, [facility]    [facility]/ID Clinic Information:  [address]  [place]  [address]  Phone: [phone]  Fax: [phone]

## 2022-05-12 NOTE — PLAN OF CARE
Patient discharged at 1630 with all personal belongings and after visit summary. Discharge instructions and medications reviewed, questions answered, and follow-up appointments noted. Patient's wife bedside and will provide transportation home and assist with care at home.

## 2022-05-12 NOTE — CONSULTS
"    Interventional Radiology Consult Service Note    Patient is on IR schedule 5/12 for a DL TCVC placement, jolly.   Labs WNL for procedure. COVID neg.    Orders for NPO have been entered. Pt is currently receiving IV antibiotics.  Consent will be done prior to procedure.     Please contact the IR charge RN at [phone] for estimated time of procedure.     Case discussed with Dr. Thomas from IR and page out to Dr. Puga. This is a 51 year old male with a history of bariatric surgery (RYGB, esophagojejunostomy complicated by short gut syndrome) on chronic TPN, recurrent CLABSI, short-gut syndrome and hx of repeated line-releated bloody stream infections and is admitted 5/7 for concern for staph epi bacteremia; likely CLABSI. IR removed the TCVC on 5/9. BCs have been negative since line removal. IR is now consulted for DL TCVC replacement for IV antibiotics and TPN.    Expected date of discharge: 5/12    Vitals:   /77 (BP Location: Left arm)   Pulse 83   Temp 98.3  F (36.8  C) (Oral)   Resp 16   Ht 1.803 m (5' 11\")   Wt 81.5 kg (179 lb 9.6 oz)   SpO2 99%   BMI 25.05 kg/m      Pertinent Labs:     Lab Results   Component Value Date    WBC 6.5 05/11/2022    WBC 5.1 05/08/2022    WBC 7.6 05/07/2022    WBC 6.9 06/29/2021    WBC 8.1 06/14/2021    WBC 7.5 06/09/2021       Lab Results   Component Value Date    HGB 12.1 05/11/2022    HGB 11.1 05/08/2022    HGB 11.1 05/07/2022    HGB 12.1 06/29/2021    HGB 12.0 06/14/2021    HGB 13.3 06/09/2021       Lab Results   Component Value Date     05/11/2022     05/10/2022     05/08/2022     06/29/2021     06/14/2021     06/09/2021       Lab Results   Component Value Date    INR 1.02 05/09/2022    INR 1.07 05/07/2021    PTT 35 05/07/2022    PTT 26 07/22/2019       Lab Results   Component Value Date    POTASSIUM 3.9 05/11/2022    POTASSIUM 3.5 06/29/2021        SAILAJA Cordova CNP  Interventional Radiology  Pager: " [phone]

## 2022-05-12 NOTE — PROGRESS NOTES
Care Management Discharge Note    Discharge Date: 05/12/2022       Discharge Disposition: Home, Home Infusion    Discharge Services: None (Home Infusion)    Discharge DME: None    Discharge Transportation: family or friend will provide    Private pay costs discussed: Not applicable    Patient/family educated on Medicare website which has current facility and service quality ratings:  Home care agency is being resumed.    Education Provided on the Discharge Plan: yes by MD Team.   Persons Notified of Discharge Plans: Patient.  Patient/Family in Agreement with the Plan:  yes    Handoff Referral Completed: Yes, to be sent    Additional Information:    Home with resumption of home TPN and IV antibiotics per MD orders.  Will follow up in clinic as designated in orders.    JEFRY Hancock.S.LATISHA., R.N., P.H.N..  Care Coordinator     Pager: [phone]/Phone: [phone]  [facility]

## 2022-05-12 NOTE — IR NOTE
Patient Name: Parker Acevedo  Medical Record Number: [id number]  Today's Date: 5/12/2022    Procedure: double lumen tunneled central venous catheter insertion  Proceduralist: Dr Thomas  Pathology present: n/a    Procedure Start:1418  Procedure end: 1440  Sedation medications administered: 2mg Veersed, 100mcg Fetanyl    Report given to: EVAN Stahl RN  : n/a    Other Notes: Pt arrived to IR room 1 from . Consent reviewed. Pt denies any questions or concerns regarding procedure. Pt positioned supine and monitored per protocol. Pt tolerated procedure without any noted complications. Pt transferred back to .

## 2022-05-12 NOTE — PLAN OF CARE
Assumed cares from [id number]. VSS on RA, A&Ox4, UAL. PIV CDI, switching between PPN and IV Vanco overnight. CHG scrubs completed for procedure today. Reporting abdominal pain and intermittent nausea managed with PRN Oxycodone x1 and PRN Zofran x1. Abdomen soft, tender, bowel sounds hypoactive. G-tube to gravity with clear brown/green output, emptied by patient, not saved. Voiding spontaneously without difficulty, not saving. Old Azevedo site CDI and SRI with steri strips, plan to place new azevedo today. Continue with POC.

## 2022-05-12 NOTE — PHARMACY-VANCOMYCIN DOSING SERVICE
Pharmacy Vancomycin Note  Date of Service May 12, 2022  Patient's  1970   51 year old, male    Indication: Bacteremia (MSSE)  Day of Therapy: 2  Current vancomycin regimen:  1250 mg IV q12h  Current vancomycin monitoring method: AUC  Current vancomycin therapeutic monitoring goal: 400-600 mg*h/L    [employer]ight[employer] Prediction of Current Vancomycin Regimen    Regimen: 1250 mg IV every 12 hours.  Start time: 12:42 on 2022  Exposure target: AUC24 (range)400-600 mg/L.hr   AUC24,ss: 430 mg/L.hr  Probability of AUC24 > 400: 63 %  Ctrough,ss: 12.0 mg/L  Probability of Ctrough,ss > 20: 6 %  Probability of nephrotoxicity (Lodise CARMELA ): 7 %      Current estimated CrCl = Estimated Creatinine Clearance: 149.3 mL/min (based on SCr of 0.69 mg/dL).    Creatinine for last 3 days  5/10/2022:  6:19 AM Creatinine 0.59 mg/dL  2022:  7:47 AM Creatinine 0.61 mg/dL  2022:  8:17 AM Creatinine 0.69 mg/dL;  8:17 AM Creatinine 0.69 mg/dL    Recent Vancomycin Levels (past 3 days)  2022: 11:16 AM Vancomycin 8.6 mg/L    Vancomycin IV Administrations (past 72 hours)                   vancomycin 1250 mg in 0.9% NaCl 250 mL intermittent infusion 1,250 mg (mg) 1,250 mg New Bag 22 1242     1,250 mg New Bag  0111     1,250 mg New Bag 22 1236                Nephrotoxins and other renal medications (From now, onward)    Start     Dose/Rate Route Frequency Ordered Stop    22 0030  vancomycin 1500 mg in 0.9% NaCl 250 ml intermittent infusion 1,500 mg         1,500 mg  over 90 Minutes Intravenous EVERY 12 HOURS 22 1307               Contrast Orders - past 72 hours (72h ago, onward)    None          Interpretation of levels and current regimen:  Vancomycin level is reflective of -600    Has serum creatinine changed greater than 50% in last 72 hours: No    Renal Function: Stable    [employer]ight[employer] Prediction of Planned New Vancomycin Regimen    Regimen: 1500 mg IV every 12 hours.  Start time: 12:42 on  05/12/2022  Exposure target: AUC24 (range)400-600 mg/L.hr   AUC24,ss: 515 mg/L.hr  Probability of AUC24 > 400: 88 %  Ctrough,ss: 14.6 mg/L  Probability of Ctrough,ss > 20: 18 %  Probability of nephrotoxicity (Lodise CARMELA 2009): 10 %      Plan:  1. Increase Dose to 1500mg IV vancomycin Q12H  2. Vancomycin monitoring method: AUC  3. Vancomycin therapeutic monitoring goal: 400-600 mg*h/L  4. Pharmacy will check vancomycin levels as appropriate in 1-3 Days.  5. Serum creatinine levels will be ordered daily for the first week of therapy and at least twice weekly for subsequent weeks.    Wilberto Jacinto PharmD, BCPS    [phone]  Pager [phone]

## 2022-05-12 NOTE — PRE-PROCEDURE
GENERAL PRE-PROCEDURE:   Procedure:  Image guided dual lumen central venous catheter placement  Date/Time:  5/12/2022 1:55 PM    Written consent obtained?: Yes    Risks and benefits: Risks, benefits and alternatives were discussed    Consent given by:  Patient  Patient states understanding of procedure being performed: Yes    Patient's understanding of procedure matches consent: Yes    Procedure consent matches procedure scheduled: Yes    Expected level of sedation:  Moderate  Appropriately NPO:  Yes  ASA Class:  2  Mallampati  :  Grade 1- soft palate, uvula, tonsillar pillars, and posterior pharyngeal wall visible  Lungs:  Lungs clear with good breath sounds bilaterally  Heart:  Normal heart sounds and rate  History & Physical reviewed:  History and physical reviewed and no updates needed  Statement of review:  I have reviewed the lab findings, diagnostic data, medications, and the plan for sedation

## 2022-05-13 ENCOUNTER — TELEPHONE (OUTPATIENT)
Dept: INFECTIOUS DISEASES | Facility: CLINIC | Age: 52
End: 2022-05-13
Payer: COMMERCIAL

## 2022-05-13 ENCOUNTER — PATIENT OUTREACH (OUTPATIENT)
Dept: CARE COORDINATION | Facility: CLINIC | Age: 52
End: 2022-05-13
Payer: COMMERCIAL

## 2022-05-13 NOTE — PROGRESS NOTES
Clinic Care Coordination Contact  [facility]: Post-Discharge Note  SITUATION                                                      Admission:    Admission Date: 05/07/22   Reason for Admission: Staph epi bacteremia  Staph haemolyticus bacteremia  R subclavian DVT  Discharge:   Discharge Date: 05/12/22  Discharge Diagnosis: Staph epi bacteremia  Staph haemolyticus bacteremia  R subclavian DVT    BACKGROUND                                                      Per hospital discharge summary and inpatient provider notes:    Parker Acevedo is a 51 year old male admitted on 5/7/2022. He has a history of bariatric surgery (RYGB, esophagojejunostomy complicated by short gut syndrome) on chronic TPN, recurrent CLABSI, short-gut syndrome and hx of repeated line-releated bloody stream infections and is admitted for staph epi and haemolyticus bacteremia; likely CLABSI.     #staph epi bacteremia  #staph haemolyticus bacteremia  #fevers, resolved  Patient was having multiple days of fever, instructed by ID to have blood cultures on 5/5 which resulted with staph epi in 2/3 blood cultures (drawn off of central line). Was contacted by ID that he should present to the emergency department for further evaluation and treatment. Here he was vitally stable, afebrile, only significant complaint is headache and tiredness. No focal infective source, Cm without drainage/purulence/bleeding/erythema though suspect for source as he has had positive blood cultures prior while Cm was in place (S epi then). Cm removed 5/9 and replaced 5/12 after 72h of negative blood cultures. TTE unremarkable. ID consulted and Cefazolin 5/7-5/11 changed to IV Vancomycin given oxacillin-resistant S. haemolyticus.     #R subclavian DVT  Patient admitted back 2/23 with R subclavian DVT (extended from proximal axillary vein to R internal jugular and brachiocephalic vein). Treated with heparin drip initially but heme/onc consulted and noted  change from heparin infusion to subcu enoxaparin with plan to continue enoxaparin for 3-6 months, repeat US at 3 months. Still w/ DVT this presentation. Improved on imaging though still having swelling in R arm, pt reporting it is improved from when he first was diagnosed. L upper extremity US: no e/o DVT. Per Hematology, continue Enoxaparin 120 daily for total of 6 months, which would be~August 25, 2022 given infection and antibiotic duration.     #s/p RYGB c/p short gut syndrome  Patient with history of bariatric surgery now with short gut syndrome. Reports regular diarrhea, nausea, abd pain, difficulty with eating occasionally and supplements nutrition with TPN per Toi. Reports that the symptoms he usually experiences from these are not too different, not concerned for new GI process or infectious process. Feeding tube in place as well. Phos and Mag wnl. Stopped TPN and started PPN due to line infection. Continued home pantoprazole 40mg daily, home carafate PRN, B12, vit D, other vitamins, and zofran PRN.    ASSESSMENT      Enrollment  Primary Care Care Coordination Status: Potential    Discharge Assessment  How are you doing now that you are home?: Writer spoke with patient's spouse, Rose (CTC on file). Spouse states that Parker is doing well since being discharged home yesterday late afternoon. Spouse states he was able to sleep well last night. Spouse states that the patient's central line dressing is looking good, no concerns. They have not heard from the home care team yet, however, the nurse comes out to their home every Tuesday. They have not heard from home infusion services yet, so Rose will be calling them. Spouse denied any medication questions or concerns. Spouse is waiting to hear back from the ID clinic to schedule patient's follow up appointment. Spouse has no questions or concerns at this time. Spouse is interested in re-enrolling Parker in to [facility].  How are your symptoms? (Red Flag  symptoms escalate to triage hotline per guidelines): Improved  Do you feel your condition is stable enough to be safe at home until your provider visit?: Yes  Does the patient have their discharge instructions? : Yes  Does the patient have questions regarding their discharge instructions? : No  Were you started on any new medications or were there changes to any of your previous medications? : Yes  Does the patient have all of their medications?: Yes  Do you have questions regarding any of your medications? : No  Do you have all of your needed medical supplies or equipment (DME)?  (i.e. oxygen tank, CPAP, cane, etc.): Yes  Discharge follow-up appointment scheduled within 14 calendar days? : No  Is patient agreeable to assistance with scheduling? : No (Spouse is waiting to hear back from ID clinic)              Care Management       Care Mgmt General Assessment  Referral  Referral Source: IP Handoff        Resources  Patient receiving home care services:: Yes  Skilled Home Care Services: Skilled Nursing             PLAN                                                      Outpatient Plan:  Patient's spouse will call home infusion services today. Patient's spouse is waiting to hear back from ID clinic to schedule follow up appointment. Spouse is interested in re-enrolling in Care Coordination.     Future Appointments   Date Time Provider Department Center   5/20/2022 12:20 PM PH COVID LAB [facility]   5/27/2022  2:00 PM Natalie Hull, APRN CNP [facility]         For any urgent concerns, please contact our 24 hour nurse triage line: [phone])         Eric Quiñones RN Care Coordinator  [facility] Care [facility]  (Covering for Lead RN Care Coordinator)

## 2022-05-13 NOTE — TELEPHONE ENCOUNTER
[facility] Call Center    Phone Message    May a detailed message be left on voicemail: yes     Reason for Call: Other: Pt   Spouse Rose called in stating she had a message from someone on Dr. Stephens team and is calling back. Not able to locate call that was placed. Rose is requesting call back at this time.     Action Taken: Other: ID    Travel Screening: Not Applicable

## 2022-05-14 LAB
BACTERIA BLD CULT: NO GROWTH
BACTERIA BLD CULT: NO GROWTH

## 2022-05-15 LAB
BACTERIA BLD CULT: ABNORMAL
BACTERIA BLD CULT: ABNORMAL
BACTERIA BLD CULT: NO GROWTH
BACTERIA BLD CULT: NO GROWTH

## 2022-05-15 NOTE — DISCHARGE SUMMARY
[facility]  Hospitalist Discharge Summary      Date of Admission:  5/7/2022  Date of Discharge:  5/12/2022  4:25 PM  Discharging Provider: Shannon Puga MD  Discharge Service: Hospitalist Service, GOLD TEAM 8    Discharge Diagnoses   Staph epi bacteremia  Staph haemolyticus bacteremia  R subclavian DVT    Follow-ups Needed After Discharge   Follow-up Appointments     Adult [facility] Follow-up and recommended labs and tests      Follow up with primary care provider, Chuy Isaac, within 7 days to   evaluate medication change and for hospital follow- up.  The following   labs/tests are recommended: CBC w diff; CMP, vancomycin level weekly.    Please follow up with Infectious Disease clinic at [facility] in 2-4 weeks.       Appointments on [place] and/or [facility] (with [facility] or [facility]   provider or service). Call [phone] if you haven't heard regarding   these appointments within 7 days of discharge.            Weekly labs required: CBC with diff, CMP and Vancomycin level. Dr. Buckner will follow labs at discharge until ID follow up. Please have labs faxed to ID clinic.     Appointment to be scheduled: Within 2-4 weeks of discharge if able.  Appointment will be scheduled with: Dr. Buckner.     ID provider to route this note to the appropriate Epic pools: [facility] INFECTIOUS DISEASE ADULT [facility]SC, PANDA, [facility]     [facility]/ID Clinic Information:  [facility], Clinic [address] [place] [address]  [address]  Phone: [phone]  Fax: [phone]      Unresulted Labs Ordered in the Past 30 Days of this Admission     Date and Time Order Name Status Description    5/12/2022  1:01 AM Blood Culture Arm, Left Preliminary     5/12/2022  1:01 AM Blood Culture Hand, Left Preliminary     5/11/2022  1:01 AM Blood Culture Hand, Right Preliminary     5/11/2022  1:01 AM Blood Culture Hand, Left Preliminary     5/10/2022  1:00 AM Blood Culture  Arm, Left Preliminary     5/10/2022  1:00 AM Blood Culture Peripheral Blood Preliminary     5/8/2022 12:48 PM Blood Culture Hand, Left Preliminary           Discharge Disposition   Discharged to home  Condition at discharge: Stable    Hospital Course   Parker Acevedo is a 51 year old male admitted on 5/7/2022. He has a history of bariatric surgery (RYGB, esophagojejunostomy complicated by short gut syndrome) on chronic TPN, recurrent CLABSI, short-gut syndrome and hx of repeated line-releated bloody stream infections and is admitted for staph epi and haemolyticus bacteremia; likely CLABSI.     #staph epi bacteremia  #staph haemolyticus bacteremia  #fevers, resolved  Patient was having multiple days of fever, instructed by ID to have blood cultures on 5/5 which resulted with staph epi in 2/3 blood cultures (drawn off of central line). Was contacted by ID that he should present to the emergency department for further evaluation and treatment. Here he was vitally stable, afebrile, only significant complaint is headache and tiredness. No additional focal infective source, Cm without drainage/purulence/bleeding/erythema, suspect for source as he has had positive blood cultures prior while Cm was in place (Staph epi). Cm removed 5/9 and replaced 5/12 after 72h of negative blood cultures. TTE without evidence of vegetations or endocarditis. ID consulted and Cefazolin 5/7-5/11 changed to IV Vancomycin given oxacillin-resistant S. Haemolyticus. Patient discharged on vancomycin for 14 day course (tentative, to be reviewed in ID clinic). Premedication with benadryl 25mg ordered to be given before each vancomycin dose (patient tolerated vancomycin inpatient without issue with premedication). Weekly labs and follow up in ID clinic per above.     #R subclavian DVT  Patient admitted 2/23 with R subclavian DVT (extended from proximal axillary vein to R internal jugular and brachiocephalic vein). Treated with  heparin drip initially but heme/onc consulted and noted change from heparin infusion to subcu enoxaparin with plan to continue enoxaparin for 3-6 months, repeat US at 3 months. Still w/ DVT this presentation. Improved on imaging though still having swelling in R arm, pt reporting it is improved from when he first was diagnosed. L upper extremity US: no e/o DVT. Per Hematology, continue Enoxaparin 120 daily for total of 6 months, which would be~August 25, 2022, given infection and antibiotic duration.     #s/p RYGB c/p short gut syndrome  Patient with history of bariatric surgery now with short gut syndrome. Reports regular diarrhea, nausea, abd pain, difficulty with eating and supplements nutrition with TPN per Toi. Reports stable symptoms here, low concern for new GI process or infectious process. G tube in place as well. Phos and Mag wnl. Stopped TPN and started temporary PPN due to line infection for line holiday, then returned to TPN once line replaced. Continued home pantoprazole 40mg daily, home carafate PRN, B12, vit D, other vitamins, and zofran PRN.       Consultations This Hospital Stay   INFECTIOUS DISEASE GENERAL ADULT IP CONSULT  HEMATOLOGY ADULT IP CONSULT  PHARMACY/NUTRITION TO START AND MANAGE TPN  CARE MANAGEMENT / SOCIAL WORK IP CONSULT  INTERVENTIONAL RADIOLOGY ADULT/PEDS IP CONSULT  PHARMACY/NUTRITION TO START AND MANAGE TPN  PHARMACY IP CONSULT  OCCUPATIONAL THERAPY ADULT IP CONSULT  NURSING TO CONSULT FOR VASCULAR ACCESS CARE IP CONSULT  NURSING TO CONSULT FOR VASCULAR ACCESS CARE IP CONSULT  CARE MANAGEMENT / SOCIAL WORK IP CONSULT  NURSING TO CONSULT FOR VASCULAR ACCESS CARE IP CONSULT  PHARMACY TO DOSE VANCO  INTERVENTIONAL RADIOLOGY ADULT/PEDS IP CONSULT  NURSING TO CONSULT FOR VASCULAR ACCESS CARE IP CONSULT    Code Status   Prior    Time Spent on this Encounter   I, Shannon Puga MD, personally saw the patient today and spent greater than 30 minutes discharging this patient.    "    Shannon Puga MD  [facility] [address]  [address] [place] [address]  Phone: [phone]  ______________________________________________________________________    Physical Exam   /67 (BP Location: Left arm)   Pulse 75   Temp 99.1  F (37.3  C) (Oral)   Resp 16   Ht 1.803 m (5' 11\")   Wt 81.5 kg (179 lb 9.6 oz)   SpO2 100%   BMI 25.05 kg/m    Gen: NAD, lying comfortably in bed, in good spirits, pleasant and cooperative with interview and exam  HEENT: normocephalic, no scleral icterus, no perioral lesions, oral mucosa moist  CV: RRR at time of exam  Pulm: good air movement bilaterally without wheezing  Abd: soft, +bs, nontender to palpation diffusely, nondistended  LE: no edema bilaterally  Skin: no rash or jaundice on limited exam  Neuro: AOx3, no tremor, gait stable  Psych: mood-affect congruent    Primary Care Physician   Chuy Isaac    Discharge Orders      Primary Care - Care Coordination Referral      Home Infusion Referral      Reason for your hospital stay    You were admitted with a blood infection and are being treated with IV antibiotics. Your line was replaced.     Activity    Your activity upon discharge: activity as tolerated     Adult [facility] Follow-up and recommended labs and tests    Follow up with primary care provider, Chuy Isaac, within 7 days to evaluate medication change and for hospital follow- up.  The following labs/tests are recommended: CBC w diff; CMP, vancomycin level weekly.    Please follow up with Infectious Disease clinic at [facility] ([facility] in 2-4 weeks.       Appointments on [place] and/or [facility] (with [facility] or [facility] provider or service). Call [phone] if you haven't heard regarding these appointments within 7 days of discharge.     Discharge Instructions    Please follow up with your primary care doctor in a week to make sure you are continuing to do well after your hospital stay.  You should be " called to schedule an appointment with Infectious Disease Clinic in 2-4 weeks.  You should have labs collected weekly while you are on IV antibiotic.  You are being sent with vancomycin IV twice a day. Please take one benadryl 25mg tab before each IV vancomycin dose.     Resume Home Care Services    Please fax discharge orders to [facility]    Ph:  [phone]    Fax: [phone]    SN for home TPN resumption and home Antibiotics per MD orders.  HC Agency for RIJV tunneled CVC line management per home care agency routine. TPN abs and medication labs per home care agency routine.  Please report labs to primary MD.    [facility]   Phone: [phone]  Fax: [phone]    Resume home care services.     Diet    Follow this diet upon discharge: Orders Placed This Encounter     TPN; prior to admission diet       Discharge Medications   Discharge Medication List as of 5/12/2022  4:17 PM      START taking these medications    Details   diphenhydrAMINE (BENADRYL) 25 MG tablet Take 1 tablet (25 mg) by mouth 2 times daily for 11 days Take one tablet by mouth twice a day before each dose of vancomycin, Disp-22 tablet, R-0, E-Prescribe      enoxaparin ANTICOAGULANT (LOVENOX) 120 MG/0.8ML syringe Inject 0.8 mLs (120 mg) Subcutaneous daily, Disp-24 mL, R-0, E-Prescribe         CONTINUE these medications which have NOT CHANGED    Details   acetaminophen (TYLENOL) 32 mg/mL liquid Take 15.65 mLs (500 mg) by mouth every 4 hours as needed for fever or mild pain, Disp-455 mL, R-1, E-Prescribe      albuterol (PROAIR HFA/PROVENTIL HFA/VENTOLIN HFA) 108 (90 Base) MCG/ACT inhaler Inhale 2 puffs into the lungs every 6 hours as needed, HistoricalPharmacy may dispense brand covered by insurance (Proair, or proventil or ventolin or generic albuterol inhaler)      amphetamine-dextroamphetamine (ADDERALL) 20 MG tablet TAKE ONE TABLET BY MOUTH ONCE DAILY, Disp-20 tablet, R-0, E-PrescribeOnly 20 so he can get them on a cycle  together in 20 days.      carvedilol (COREG) 6.25 MG tablet Take 12.5 mg by mouth 2 times daily (with meals) , Historical      cyanocobalamin (CYANOCOBALAMIN) 1000 MCG/ML injection INJECT 1 ML INTO THE MUSCLE EVERY 30 DAYS, Disp-1 mL, R-3, E-Prescribe      diphenhydrAMINE (BENADRYL) 12.5 MG/5ML syrup Take 25 mg by mouth every 4 hours as needed for itching, allergies or sleep, Disp-237 mL, R-0, E-Prescribe      fentaNYL (DURAGESIC) 25 mcg/hr 72 hr patch Place 1 patch onto the skin every 48 hours remove old patch. Fill Fill 04/29/22 and start 05/02/22. MUST BE SEEN IN CLINIC PRIOR TO FUTURE REFILLS, Disp-15 patch, R-0, E-Prescribe      lidocaine (LIDODERM) 5 % patch Place 1-2 patches onto the skin every 24 hours Wear for 12 hours, remove for 12 hours.  OK to cut to better fit to size.Disp-60 patch, [id number]      LORazepam (ATIVAN) 1 MG tablet TAKE 1 TABLET (1MG) BY MOUTH AS NEEDED FOR ANXIETY WITH TPN AND MEDICATIONS . JUST ONCE A DAY (30 TO LAST 30 DAYS), Disp-20 tablet, R-0, E-PrescribeOnly 20 so he can get earlier next month and get all on cycle      naloxone (NARCAN) 4 MG/0.1ML nasal spray Spray 1 spray (4 mg) into one nostril alternating nostrils once as needed for opioid reversal every 2-3 minutes until assistance arrives, Disp-0.2 mL, R-0, E-Prescribe      nystatin (MYCOSTATIN) 255929 UNIT/GM external cream APPLY TOPICALLY 2 TIMES DAILYDisp-30 g, [id number]      ondansetron (ZOFRAN-ODT) 8 MG ODT tab DISSOLVE ONE TABLET ON TONGUE EVERY 8 HOURS AS NEEDED FOR NAUSEA, Disp-90 tablet, R-1, E-Prescribe      oxyCODONE (ROXICODONE) 5 MG/5ML solution Take 5-10 mLs (5-10 mg) by mouth 3 times daily as needed for pain Max of 30mg/day. Put at least 4 hours between doses. Fill 04/29/22 and start 05/02/22. 30 day supply. MUST BE SEEN IN CLINIC PRIOR TO FUTURE REFILLS, Disp-900 mL, R-0, E-Prescribe      pantoprazole (PROTONIX) 40 MG EC tablet Take 1 tablet (40 mg) by mouth daily, Disp-30 tablet, R-5, E-Prescribe     "  polyethylene glycol (MIRALAX) 17 GM/Dose powder Take 17 g by mouth daily, Disp-1020 g, R-3, E-Prescribe(2 bottles)      sucralfate (CARAFATE) 1 GM/10ML suspension TAKE 10MLS  BY MOUTH FOUR TIMES A DAY AS NEEDED, Disp-420 mL, R-1, E-Prescribe      vancomycin 1,500 mg Inject 1,500 mg into the vein every 12 hours, HistoricalTake benadryl premedication before vancomycin infusion. Weekly labs: CBC with diff, CMP, vancomycin level.      vitamin D2 (ERGOCALCIFEROL) 78325 units (1250 mcg) capsule TAKE 1 CAPSULE (50,000 UNITS) BY MOUTH ONCE A WEEK, Disp-12 capsule, R-3, E-Prescribe      EPINEPHrine (ANY BX GENERIC EQUIV) 0.3 MG/0.3ML injection 2-pack Historical      !! [facility] INFUSION MANAGED PATIENT Contact [facility] for patient specific medication information at [phone] on admission and discharge from the hospital.  Phones are answered 24 hours a day 7 days a week 365 days a year.    Providers - Choose \"CONTINUE HOME MED (no scr ipt)\" at discharge if patient treatment with home infusion will continue., No Print OutResume prior to admission TPN/Lipids/saline      insulin syringe-needle U-100 (29G X 1/2\" 1 ML) 29G X 1/2\" 1 ML miscellaneous Use to inject b12 every 30 days, Disp-3 each, R-4, E-Prescribe      lactated ringers infusion Inject 1,000-2,000 mLs into the vein daily as needed, No Print Out      !! parenteral nutrition - PTA/DISCHARGE ORDER Patient receives 2050 mL TPN every 14 hours through PICC at [facility]., Historical       !! - Potential duplicate medications found. Please discuss with provider.      STOP taking these medications       enoxaparin ANTICOAGULANT (LOVENOX) 120 MG/0.8ML syringe Comments:   Reason for Stopping:         vancomycin 1,250 mg Comments:   Reason for Stopping:             Allergies   Allergies   Allergen Reactions     Bactrim [Sulfamethoxazole W/Trimethoprim] Rash     Penicillins Anaphylaxis     Please see Antimicrobial Management Team allergy " assessment note 10/10/2018. Patient reported tolerating amoxicillin.     Doxycycline Rash     Vancomycin Rash     Rash after receiving vancomycin 3/28/16 (infusion reaction?). Tolerated with slower infusion and diphenhydramine premed.

## 2022-05-16 ENCOUNTER — TELEPHONE (OUTPATIENT)
Dept: GASTROENTEROLOGY | Facility: CLINIC | Age: 52
End: 2022-05-16

## 2022-05-16 DIAGNOSIS — R78.81 GRAM-POSITIVE BACTEREMIA: Primary | ICD-10-CM

## 2022-05-16 LAB
BACTERIA BLD CULT: NO GROWTH
BACTERIA BLD CULT: NO GROWTH

## 2022-05-16 RX ORDER — BISACODYL 5 MG/1
TABLET, DELAYED RELEASE ORAL
Qty: 2 TABLET | Refills: 0 | Status: SHIPPED | OUTPATIENT
Start: 2022-05-16 | End: 2022-07-06

## 2022-05-16 NOTE — TELEPHONE ENCOUNTER
Attempted to contact patient regarding upcoming colonoscopy procedure on 5/23/22 for pre assessment questions. No answer.     Left message to return call to [phone] #2    Covid test scheduled: 5/20/22    Arrival time: 1345    Facility location: [facility]     Sedation type: MAC     Indication for procedure: Gram-positive bacteremia    Anticoagulants: Lovenox. Holding interval of 24 days. Staff message sent to primary regarding holding interval recommendations    Bowel prep recommendation: Extended prep d/t poor prep.     Extended script sent to [place], [place] - [address]. Prep instructions sent via [employer].    Jyothi Holloway RN

## 2022-05-17 LAB
BACTERIA BLD CULT: NO GROWTH
BACTERIA BLD CULT: NO GROWTH

## 2022-05-18 ENCOUNTER — HOME INFUSION (PRE-WILLOW HOME INFUSION) (OUTPATIENT)
Dept: PHARMACY | Facility: CLINIC | Age: 52
End: 2022-05-18

## 2022-05-18 ENCOUNTER — LAB REQUISITION (OUTPATIENT)
Dept: LAB | Facility: CLINIC | Age: 52
End: 2022-05-18
Payer: COMMERCIAL

## 2022-05-18 ENCOUNTER — TRANSFERRED RECORDS (OUTPATIENT)
Dept: HEALTH INFORMATION MANAGEMENT | Facility: CLINIC | Age: 52
End: 2022-05-18

## 2022-05-18 ENCOUNTER — TELEPHONE (OUTPATIENT)
Dept: INFECTIOUS DISEASES | Facility: CLINIC | Age: 52
End: 2022-05-18

## 2022-05-18 DIAGNOSIS — T80.211D BLOODSTREAM INFECTION DUE TO CENTRAL VENOUS CATHETER, SUBSEQUENT ENCOUNTER: ICD-10-CM

## 2022-05-18 DIAGNOSIS — R13.10 DYSPHAGIA, UNSPECIFIED: ICD-10-CM

## 2022-05-18 LAB
ALBUMIN SERPL-MCNC: 3.1 G/DL (ref 3.4–5)
ALP SERPL-CCNC: 55 U/L (ref 40–150)
ALT SERPL W P-5'-P-CCNC: 26 U/L (ref 0–70)
ANION GAP SERPL CALCULATED.3IONS-SCNC: 5 MMOL/L (ref 3–14)
AST SERPL W P-5'-P-CCNC: 17 U/L (ref 0–45)
BASOPHILS # BLD AUTO: 0 10E3/UL (ref 0–0.2)
BASOPHILS NFR BLD AUTO: 1 %
BILIRUB SERPL-MCNC: 0.5 MG/DL (ref 0.2–1.3)
BUN SERPL-MCNC: 8 MG/DL (ref 7–30)
CALCIUM SERPL-MCNC: 8.3 MG/DL (ref 8.5–10.1)
CHLORIDE BLD-SCNC: 110 MMOL/L (ref 94–109)
CO2 SERPL-SCNC: 30 MMOL/L (ref 20–32)
CREAT SERPL-MCNC: 0.68 MG/DL (ref 0.66–1.25)
EOSINOPHIL # BLD AUTO: 0.1 10E3/UL (ref 0–0.7)
EOSINOPHIL NFR BLD AUTO: 2 %
ERYTHROCYTE [DISTWIDTH] IN BLOOD BY AUTOMATED COUNT: 13.7 % (ref 10–15)
GFR SERPL CREATININE-BSD FRML MDRD: >90 ML/MIN/1.73M2
GLUCOSE BLD-MCNC: 94 MG/DL (ref 70–99)
HCT VFR BLD AUTO: 33.9 % (ref 40–53)
HGB BLD-MCNC: 11.3 G/DL (ref 13.3–17.7)
IMM GRANULOCYTES # BLD: 0 10E3/UL
IMM GRANULOCYTES NFR BLD: 0 %
LYMPHOCYTES # BLD AUTO: 1.5 10E3/UL (ref 0.8–5.3)
LYMPHOCYTES NFR BLD AUTO: 28 %
MAGNESIUM SERPL-MCNC: 1.8 MG/DL (ref 1.6–2.3)
MCH RBC QN AUTO: 31 PG (ref 26.5–33)
MCHC RBC AUTO-ENTMCNC: 33.3 G/DL (ref 31.5–36.5)
MCV RBC AUTO: 93 FL (ref 78–100)
MONOCYTES # BLD AUTO: 0.5 10E3/UL (ref 0–1.3)
MONOCYTES NFR BLD AUTO: 10 %
NEUTROPHILS # BLD AUTO: 3 10E3/UL (ref 1.6–8.3)
NEUTROPHILS NFR BLD AUTO: 59 %
NRBC # BLD AUTO: 0 10E3/UL
NRBC BLD AUTO-RTO: 0 /100
PHOSPHATE SERPL-MCNC: 3.2 MG/DL (ref 2.5–4.5)
PLATELET # BLD AUTO: 221 10E3/UL (ref 150–450)
POTASSIUM BLD-SCNC: 3.5 MMOL/L (ref 3.4–5.3)
PROT SERPL-MCNC: 5.8 G/DL (ref 6.8–8.8)
RBC # BLD AUTO: 3.64 10E6/UL (ref 4.4–5.9)
SODIUM SERPL-SCNC: 145 MMOL/L (ref 133–144)
VANCOMYCIN SERPL-MCNC: 13 MG/L
WBC # BLD AUTO: 5.2 10E3/UL (ref 4–11)

## 2022-05-18 PROCEDURE — 36591 DRAW BLOOD OFF VENOUS DEVICE: CPT | Performed by: SURGERY

## 2022-05-18 PROCEDURE — 85025 COMPLETE CBC W/AUTO DIFF WBC: CPT | Performed by: SURGERY

## 2022-05-18 PROCEDURE — 80053 COMPREHEN METABOLIC PANEL: CPT | Performed by: SURGERY

## 2022-05-18 PROCEDURE — 36415 COLL VENOUS BLD VENIPUNCTURE: CPT | Performed by: SURGERY

## 2022-05-18 PROCEDURE — 84100 ASSAY OF PHOSPHORUS: CPT | Performed by: SURGERY

## 2022-05-18 PROCEDURE — 83735 ASSAY OF MAGNESIUM: CPT | Performed by: SURGERY

## 2022-05-18 PROCEDURE — 80202 ASSAY OF VANCOMYCIN: CPT | Performed by: SURGERY

## 2022-05-18 NOTE — TELEPHONE ENCOUNTER
LM for Home care nurse manager requesting weekly lab draws w/faxed results and nurse visit date.   Pt has appt on 6/9, so need lab results weekly until then.

## 2022-05-18 NOTE — TELEPHONE ENCOUNTER
Second attempt.     Attempted to contact patient for pre assessment questions. No answer.     Left message to return call to [phone] #2    Anticoagulants: Enoxaparin (Lovenox). Holding interval of 24 days    Jyothi Holloway RN

## 2022-05-18 NOTE — PROGRESS NOTES
This is a recent snapshot of the patient's [facility] medical record.  For current drug dose and complete information and questions, call [phone] or In Basket pool, [facility] home infusion ([id number])  CSN Number:  [id number]

## 2022-05-19 ENCOUNTER — TELEPHONE (OUTPATIENT)
Dept: GASTROENTEROLOGY | Facility: CLINIC | Age: 52
End: 2022-05-19
Payer: COMMERCIAL

## 2022-05-19 NOTE — TELEPHONE ENCOUNTER
Caller: laney    Procedure: colon    Date, Location, and Surgeon of Procedure Cancelled: 5/23 [facility]    Ordering Provider:Jimbo Estrada MD    Reason for cancel (please be detailed, any staff messages or encounters to note?): just got out of hospital        Rescheduled: y     If rescheduled:    Date: 7/12   Location: [facility] [place] [place]   Prep Resent: n(changes to prep?)   Covid Test Rescheduled: y   Note any change or update to original order/sedation:

## 2022-05-19 NOTE — PROGRESS NOTES
This is a recent snapshot of the patient's [facility] medical record.  For current drug dose and complete information and questions, call [phone] or In Basket pool, [facility] home infusion ([id number])  CSN Number:  [id number]

## 2022-05-20 ENCOUNTER — PATIENT OUTREACH (OUTPATIENT)
Dept: NURSING | Facility: CLINIC | Age: 52
End: 2022-05-20
Payer: COMMERCIAL

## 2022-05-20 DIAGNOSIS — R78.81 GRAM-POSITIVE BACTEREMIA: ICD-10-CM

## 2022-05-20 SDOH — ECONOMIC STABILITY: FOOD INSECURITY: WITHIN THE PAST 12 MONTHS, THE FOOD YOU BOUGHT JUST DIDN'T LAST AND YOU DIDN'T HAVE MONEY TO GET MORE.: NEVER TRUE

## 2022-05-20 SDOH — ECONOMIC STABILITY: FOOD INSECURITY: WITHIN THE PAST 12 MONTHS, YOU WORRIED THAT YOUR FOOD WOULD RUN OUT BEFORE YOU GOT MONEY TO BUY MORE.: NEVER TRUE

## 2022-05-20 ASSESSMENT — ACTIVITIES OF DAILY LIVING (ADL): DEPENDENT_IADLS:: TRANSPORTATION

## 2022-05-20 ASSESSMENT — SOCIAL DETERMINANTS OF HEALTH (SDOH): HOW HARD IS IT FOR YOU TO PAY FOR THE VERY BASICS LIKE FOOD, HOUSING, MEDICAL CARE, AND HEATING?: SOMEWHAT HARD

## 2022-05-20 NOTE — PROGRESS NOTES
Clinic Care Coordination Contact    Clinic Care Coordination Contact  OUTREACH    Referral Information:  Referral Source: IP Handoff    Primary Diagnosis: SIRS/Sepsis    Chief Complaint   Patient presents with     Clinic Care Coordination - Initial        Universal Utilization: Clinic Utilization  Difficulty keeping appointments:: Yes  Compliance Concerns: Yes  No-Show Concerns: No  No PCP office visit in Past Year: No  Utilization    Hospital Admissions  3             ED Visits  6             No Show Count (past year)  4                Current as of: 5/20/2022  1:20 PM              Clinical Concerns:  Current Medical Concerns:    Patient Active Problem List   Diagnosis     Peptic ulcer disease     Gastric bypass status for obesity     Chronic abdominal pain     Vomiting     Anemia     ADHD (attention deficit hyperactivity disorder), inattentive type     Vitamin B12 deficiency without anemia     Thiamine deficiency     Dehydration     Dysphagia     Weight loss, non-intentional     Malnutrition (H)     Chronic anxiety     Constipation     Bile reflux esophagitis     Former smoker     Vitamin D deficiency     Iron deficiency     Health Care Home     Chronic pain     Insomnia     Positive blood culture     Fungemia     Chronic nausea     Short gut syndrome     Anxiety     Status post cervical spinal arthrodesis     Port or reservoir infection, initial encounter     Abnormal echocardiogram     Low serum iron     Anemia, iron deficiency     Catheter-related bloodstream infection (CRBSI)     Generalized weakness     S/P bariatric surgery     Hyperlipidemia LDL goal <130     Bacteremia     Infection by Candida species     PICC line infiltration, sequela     Gram-positive bacteremia     On total parenteral nutrition     Gastric outlet obstruction     Short bowel syndrome     Bloodstream infection associated with central venous catheter, initial encounter     Fever, unknown origin     Acute deep vein thrombosis (DVT) of  "axillary vein of right upper extremity (H)     Bacteremia associated with intravascular line, initial encounter (H)   Current Behavioral Concerns:none  Education Provided to patient: RN CC spoke with spouse Rose (consent to communicate on file) for CC enrollment. Discussed patients goals of care. CC RN asked open ended questions, provided support, resources, and encouragement as needed. We discussed logistics of how to contact clinic, assess care, triage, specialty and mychart.   Patient has chronic pain, and is established with pain clinic. Wife reports it all started with \"gastric by pass that went wrong in 2003, and 2012 did a re-do and another ulcer surgery that went bad.\" Eating little by mouth, majority of nutrition via TPN BID and hydration via azevedo. Has a Jim tube for venting only. Has support of [facility], and [facility] home care comes once per week for skilled nursing   All of patients care teams are through [facility] other than cardiology, which is with [facility] heart and vascular. On social security/disability. Not working. Not getting any [employer] benefits. Family helps provide food, CC RN offered economic assistance via [employer] and [employer], she declined both resources despite reporting finances are tight.  Patient recovering from recent hospitalization, wife reporting all follow up is squared away. No further questions.   Pain  Pain (GOAL):: Yes  Health Maintenance Reviewed: Due/Overdue   Health Maintenance Due   Topic Date Due     MEDICARE ANNUAL WELLNESS VISIT  06/21/2017     COVID-19 Vaccine (3 - Booster for Moderna series) 01/09/2022   Clinical Pathway: None    Medication Management:  Medication review status: Medications reviewed and no changes reported per patient.          Functional Status:  Dependent ADLs:: Independent  Dependent IADLs:: Transportation  Bed or wheelchair confined:: No  Mobility Status: Independent    Living Situation:  Current living arrangement:: I live in a " private home with spouse  Type of residence:: Private home - stairs    Lifestyle & Psychosocial Needs:    Social Determinants of Health     Tobacco Use: High Risk     Smoking Tobacco Use: Light Tobacco Smoker     Smokeless Tobacco Use: Never Used   Alcohol Use: Not At Risk     Frequency of Alcohol Consumption: Never     Average Number of Drinks: Patient refused     Frequency of Binge Drinking: Never   Financial Resource Strain: Medium Risk     Difficulty of Paying Living Expenses: Somewhat hard   Food Insecurity: No Food Insecurity     Worried About Running Out of Food in the Last Year: Never true     Ran Out of Food in the Last Year: Never true   Transportation Needs: No Transportation Needs     Lack of Transportation (Medical): No     Lack of Transportation (Non-Medical): No   Physical Activity: Not on file   Stress: Not on file   Social Connections: Not on file   Intimate Partner Violence: Not At Risk     Fear of Current or Ex-Partner: No     Emotionally Abused: No     Physically Abused: No     Sexually Abused: No   Depression: Not at risk     PHQ-2 Score: 0   Housing Stability: Not on file     Diet:: Regular  Inadequate nutrition (GOAL):: No  Tube Feeding: No  Inadequate activity/exercise (GOAL):: No  Significant changes in sleep pattern (GOAL): No  Transportation means:: Regular car, Friend     Latter-day or spiritual beliefs that impact treatment:: No  Mental health DX:: Yes  Mental health management concern (GOAL):: No  Chemical Dependency Status: No Current Concerns  Informal Support system:: Family, Spouse   Resources and Interventions:  Current Resources:   Skilled Home Care Services: Skilled Nursing  Community Resources: Infusion Services  Supplies Currently Used at Home: Other (TPN supplies)  Equipment Currently Used at Home: caneletty  Employment Status: disabled     Advance Care Plan/Directive  Advanced Care Plans/Directives on file:: Yes  Type Advanced Care Plans/Directives: Advanced Directive -  On File  Advanced Care Plan/Directive Status: Not Applicable    Referrals Placed: None    Goals:    Goals        General     Medical (pt-stated)      Notes - Note created  5/20/2022  1:52 PM by Jami Blank RN     Goal Statement: I will complete my annual wellness visit.  Date Goal set: 5/20/22  Barriers: complex care, and high volume of appointments at baseline  Strengths: wife is pts main caregiver, and organizes his appointments.   Date to Achieve By: 9/2022  Patient expressed understanding of goal: yes  Action steps to achieve this goal:  1. I will schedule my annual wellness visit; [id number] ([phone])  2. I will attend my annual wellness visit.  3. I will contact my Care Management or clinic team if I have barriers to attending my annual wellness visit.              Patient/Caregiver understanding: yes    Outreach Frequency: monthly  Future Appointments              In 1 week Natalie Hull APRN CNP [facility], [facility]    In 2 weeks Марина Buckner MD [facility] Disease [facility]    In 1 month PH COVID LAB [facility]        Plan: 1. Patient/wife verbalized good understanding of care plans and will follow recommendations.  2. Patient enrolled in Care Coordination, goal(s) identified at this time.   3. Patient centered care plan, and introduction letter sent to patient.  4. CC RN will reach out to patient/wife in 1 month, if additional need(s) arise patient encouraged to contact CC RN or care team directly sooner.     Jami Blank RN, BSN, PHN Care Coordinator  [facility]   Phone: [phone]

## 2022-05-20 NOTE — PROGRESS NOTES
This is a recent snapshot of the patient's [facility] medical record.  For current drug dose and complete information and questions, call [phone] or In Basket pool, [facility] home infusion ([id number])  CSN Number:  [id number]

## 2022-05-23 ENCOUNTER — TELEPHONE (OUTPATIENT)
Dept: PALLIATIVE MEDICINE | Facility: CLINIC | Age: 52
End: 2022-05-23
Payer: COMMERCIAL

## 2022-05-23 ENCOUNTER — MYC MEDICAL ADVICE (OUTPATIENT)
Dept: PALLIATIVE MEDICINE | Facility: CLINIC | Age: 52
End: 2022-05-23
Payer: COMMERCIAL

## 2022-05-23 ENCOUNTER — HOME INFUSION (PRE-WILLOW HOME INFUSION) (OUTPATIENT)
Dept: PHARMACY | Facility: CLINIC | Age: 52
End: 2022-05-23

## 2022-05-23 NOTE — TELEPHONE ENCOUNTER
Srini to [facility].  eval on 5/27/22    Fentanyl last due: Fill Fill 04/29/22 and start 05/02/22      This refill can be provided at his upcoming eval with SAILAJA Matson CNP   _________________________________    Mychart sent to pt:  From: Demetrice Yeh RN      Created: 5/23/2022 2:47 PM      Francisco Canales,  Your fentanyl refill can be processed at your upcoming eval appointment with SAILAJA Matson CNP on 5/27/22.     See ya then.        CHRISTY Suresh-BSN  [facility]

## 2022-05-23 NOTE — TELEPHONE ENCOUNTER
Received fax from pharmacy requesting refill(s) for fentaNYL (DURAGESIC) 25 mcg/hr 72 hr patch     Last dispensed from pharmacy on 4/29/22    Patient's last office/virtual visit by prescribing provider on 2/9/22  Next office/virtual appointment scheduled for 5/27/22    Last urine drug screen date 1/5/22  Current opioid agreement on file? Yes Date of opioid agreement: 1/5/22    E-prescribe to:    [facility] [place] - [address] [facility]    Will route to nursing [place] for review and preparation of prescription(s).

## 2022-05-24 ENCOUNTER — HOME INFUSION (PRE-WILLOW HOME INFUSION) (OUTPATIENT)
Dept: PHARMACY | Facility: CLINIC | Age: 52
End: 2022-05-24
Payer: COMMERCIAL

## 2022-05-24 ENCOUNTER — MEDICAL CORRESPONDENCE (OUTPATIENT)
Dept: HEALTH INFORMATION MANAGEMENT | Facility: CLINIC | Age: 52
End: 2022-05-24

## 2022-05-24 DIAGNOSIS — F90.0 ADHD (ATTENTION DEFICIT HYPERACTIVITY DISORDER), INATTENTIVE TYPE: ICD-10-CM

## 2022-05-24 RX ORDER — LORAZEPAM 1 MG/1
TABLET ORAL
Qty: 30 TABLET | Refills: 0 | Status: SHIPPED | OUTPATIENT
Start: 2022-05-24 | End: 2022-06-24

## 2022-05-24 RX ORDER — POLYETHYLENE GLYCOL 3350, SODIUM SULFATE ANHYDROUS, SODIUM BICARBONATE, SODIUM CHLORIDE, POTASSIUM CHLORIDE 236; 22.74; 6.74; 5.86; 2.97 G/4L; G/4L; G/4L; G/4L; G/4L
POWDER, FOR SOLUTION ORAL
Refills: 0 | OUTPATIENT
Start: 2022-05-24

## 2022-05-24 RX ORDER — DEXTROAMPHETAMINE SACCHARATE, AMPHETAMINE ASPARTATE, DEXTROAMPHETAMINE SULFATE AND AMPHETAMINE SULFATE 5; 5; 5; 5 MG/1; MG/1; MG/1; MG/1
TABLET ORAL
Qty: 30 TABLET | Refills: 0 | Status: SHIPPED | OUTPATIENT
Start: 2022-05-24 | End: 2022-06-24

## 2022-05-24 NOTE — TELEPHONE ENCOUNTER
Information noted. Agree with plan.     Natalie MENARD, RN CNP, FNP  [facility]

## 2022-05-24 NOTE — TELEPHONE ENCOUNTER
Both only filled for 20 days so will need refills  Sharon Fair, Pharmacy [facility] [place] [phone]

## 2022-05-25 ENCOUNTER — LAB REQUISITION (OUTPATIENT)
Dept: LAB | Facility: CLINIC | Age: 52
End: 2022-05-25
Payer: COMMERCIAL

## 2022-05-25 ENCOUNTER — NURSE TRIAGE (OUTPATIENT)
Dept: NURSING | Facility: CLINIC | Age: 52
End: 2022-05-25

## 2022-05-25 ENCOUNTER — MYC MEDICAL ADVICE (OUTPATIENT)
Dept: INTERNAL MEDICINE | Facility: CLINIC | Age: 52
End: 2022-05-25

## 2022-05-25 ENCOUNTER — TELEPHONE (OUTPATIENT)
Dept: PALLIATIVE MEDICINE | Facility: CLINIC | Age: 52
End: 2022-05-25

## 2022-05-25 ENCOUNTER — HOME INFUSION (PRE-WILLOW HOME INFUSION) (OUTPATIENT)
Dept: PHARMACY | Facility: CLINIC | Age: 52
End: 2022-05-25

## 2022-05-25 ENCOUNTER — MYC MEDICAL ADVICE (OUTPATIENT)
Dept: ENDOCRINOLOGY | Facility: CLINIC | Age: 52
End: 2022-05-25

## 2022-05-25 DIAGNOSIS — G89.29 CHRONIC ABDOMINAL PAIN: ICD-10-CM

## 2022-05-25 DIAGNOSIS — R13.10 DYSPHAGIA, UNSPECIFIED: ICD-10-CM

## 2022-05-25 DIAGNOSIS — R10.9 CHRONIC ABDOMINAL PAIN: ICD-10-CM

## 2022-05-25 LAB
ALBUMIN SERPL-MCNC: 3.1 G/DL (ref 3.4–5)
ALP SERPL-CCNC: 51 U/L (ref 40–150)
ALT SERPL W P-5'-P-CCNC: 21 U/L (ref 0–70)
ANION GAP SERPL CALCULATED.3IONS-SCNC: 7 MMOL/L (ref 3–14)
AST SERPL W P-5'-P-CCNC: 12 U/L (ref 0–45)
BASOPHILS # BLD AUTO: 0.1 10E3/UL (ref 0–0.2)
BASOPHILS NFR BLD AUTO: 1 %
BILIRUB SERPL-MCNC: 0.6 MG/DL (ref 0.2–1.3)
BILIRUB SERPL-MCNC: 0.6 MG/DL (ref 0.2–1.3)
BUN SERPL-MCNC: 11 MG/DL (ref 7–30)
CALCIUM SERPL-MCNC: 8.3 MG/DL (ref 8.5–10.1)
CHLORIDE BLD-SCNC: 111 MMOL/L (ref 94–109)
CO2 SERPL-SCNC: 28 MMOL/L (ref 20–32)
CREAT SERPL-MCNC: 0.69 MG/DL (ref 0.66–1.25)
EOSINOPHIL # BLD AUTO: 0.1 10E3/UL (ref 0–0.7)
EOSINOPHIL NFR BLD AUTO: 2 %
ERYTHROCYTE [DISTWIDTH] IN BLOOD BY AUTOMATED COUNT: 14.2 % (ref 10–15)
FASTING STATUS PATIENT QL REPORTED: NORMAL
GFR SERPL CREATININE-BSD FRML MDRD: >90 ML/MIN/1.73M2
GLUCOSE BLD-MCNC: 82 MG/DL (ref 70–99)
HCT VFR BLD AUTO: 35.9 % (ref 40–53)
HGB BLD-MCNC: 11.9 G/DL (ref 13.3–17.7)
IMM GRANULOCYTES # BLD: 0 10E3/UL
IMM GRANULOCYTES NFR BLD: 0 %
LYMPHOCYTES # BLD AUTO: 1.6 10E3/UL (ref 0.8–5.3)
LYMPHOCYTES NFR BLD AUTO: 31 %
MAGNESIUM SERPL-MCNC: 1.9 MG/DL (ref 1.6–2.3)
MCH RBC QN AUTO: 30.8 PG (ref 26.5–33)
MCHC RBC AUTO-ENTMCNC: 33.1 G/DL (ref 31.5–36.5)
MCV RBC AUTO: 93 FL (ref 78–100)
MONOCYTES # BLD AUTO: 0.6 10E3/UL (ref 0–1.3)
MONOCYTES NFR BLD AUTO: 12 %
NEUTROPHILS # BLD AUTO: 2.8 10E3/UL (ref 1.6–8.3)
NEUTROPHILS NFR BLD AUTO: 54 %
NRBC # BLD AUTO: 0 10E3/UL
NRBC BLD AUTO-RTO: 0 /100
PHOSPHATE SERPL-MCNC: 3.8 MG/DL (ref 2.5–4.5)
PLATELET # BLD AUTO: 190 10E3/UL (ref 150–450)
POTASSIUM BLD-SCNC: 3.3 MMOL/L (ref 3.4–5.3)
PROT SERPL-MCNC: 6.1 G/DL (ref 6.8–8.8)
RBC # BLD AUTO: 3.86 10E6/UL (ref 4.4–5.9)
SODIUM SERPL-SCNC: 146 MMOL/L (ref 133–144)
TRIGL SERPL-MCNC: 73 MG/DL
VANCOMYCIN SERPL-MCNC: 13.9 MG/L
WBC # BLD AUTO: 5.2 10E3/UL (ref 4–11)

## 2022-05-25 PROCEDURE — 83735 ASSAY OF MAGNESIUM: CPT | Performed by: SURGERY

## 2022-05-25 PROCEDURE — 80202 ASSAY OF VANCOMYCIN: CPT | Performed by: SURGERY

## 2022-05-25 PROCEDURE — 80053 COMPREHEN METABOLIC PANEL: CPT | Performed by: SURGERY

## 2022-05-25 PROCEDURE — 84478 ASSAY OF TRIGLYCERIDES: CPT | Performed by: SURGERY

## 2022-05-25 PROCEDURE — 85025 COMPLETE CBC W/AUTO DIFF WBC: CPT | Performed by: SURGERY

## 2022-05-25 PROCEDURE — 36592 COLLECT BLOOD FROM PICC: CPT | Performed by: SURGERY

## 2022-05-25 PROCEDURE — 82247 BILIRUBIN TOTAL: CPT | Performed by: SURGERY

## 2022-05-25 PROCEDURE — 84100 ASSAY OF PHOSPHORUS: CPT | Performed by: SURGERY

## 2022-05-25 RX ORDER — OXYCODONE HCL 5 MG/5 ML
5-10 SOLUTION, ORAL ORAL 3 TIMES DAILY PRN
Qty: 900 ML | Refills: 0 | Status: CANCELLED | OUTPATIENT
Start: 2022-05-25

## 2022-05-25 NOTE — TELEPHONE ENCOUNTER
Received call from patient requesting refill(s) of oxyCODONE (ROXICODONE) 5 MG/5ML solution     Last dispensed from pharmacy on 04/29/2022    Patient's last office/virtual visit by prescribing provider on 02/092022  Next office/virtual appointment scheduled for 05/247/2022    Last urine drug screen date 01/05/2022  Current opioid agreement on file (completed within the last year) Yes Date of opioid agreement: 01/05/2022    E-prescribe to [facility] [place] - [address] [facility]    Will route to nursing [place] for review and preparation of prescription(s).

## 2022-05-25 NOTE — TELEPHONE ENCOUNTER
Patient requesting refill oxyCODONE (ROXICODONE) 5 MG/5ML solution    Pharmacy [facility] [place] - [address] [facility]    Fax from pharmacy    Natalie Gao  [facility] Patient Facilitator

## 2022-05-25 NOTE — PROGRESS NOTES
This is a recent snapshot of the patient's [facility] medical record.  For current drug dose and complete information and questions, call [phone] or In Basket pool, [facility] home infusion ([id number])  CSN Number:  [id number]

## 2022-05-25 NOTE — TELEPHONE ENCOUNTER
"Triage Call:    Patient's home care nurse, Eva from [facility], called to update PCP.  Patient reported to her that he vomited \"coffee ground material\" X1 on 5/23/22.  He also reports 5/10 abdominal pain.  She report his vitals were stable.  Patient declined to go to ED or UD with nurses recommendation.    Eva tata labs from central line and wanted to report that the red lumen flushed well, but unable to aspirate blood.  Nurse is requesting a call to herself ([phone]) and patient ([phone]).    Routing note to PCP.    Mendy Gupta RN  05/25/22 6:29 PM  [facility] Nurse Advisor      Reason for Disposition    [1] Caller requests to speak ONLY to PCP AND [2] NON-URGENT question    Additional Information    Negative: Lab calling with strep throat test results and triager can call in prescription    Negative: Lab calling with urinalysis test results and triager can call in prescription    Negative: Medication questions    Negative: ED call to PCP    Negative: Physician call to PCP    Negative: Call about patient who is currently hospitalized    Negative: Lab or radiology calling with CRITICAL test results    Negative: [1] Prescription not at pharmacy AND [2] was prescribed today by PCP    Negative: [1] Follow-up call from patient regarding patient's clinical status AND [2] information urgent    Negative: [1] Caller requests to speak ONLY to PCP AND [2] urgent question    Negative: [1] Caller requests to speak to PCP now AND [2] won't tell us reason for call  (Exception: if 10 pm to 6 am, caller must first discuss reason for the call)    Negative: Notification of hospital admission    Negative: Notification of death    Negative: Caller requesting lab results    Negative: Lab or radiology calling with test results    Negative: [1] Follow-up call from patient regarding patient's clinical status AND [2] information NON-URGENT    Protocols used: PCP CALL - NO TRIAGE-A-      "

## 2022-05-26 ENCOUNTER — HOME INFUSION (PRE-WILLOW HOME INFUSION) (OUTPATIENT)
Dept: PHARMACY | Facility: CLINIC | Age: 52
End: 2022-05-26

## 2022-05-26 NOTE — TELEPHONE ENCOUNTER
Per chart patient has triage and messaging with Bariatric Surgery RN yesterday. Will direct patient to continue management with this team per PL note

## 2022-05-26 NOTE — TELEPHONE ENCOUNTER
Provider Response to 2nd Level Triage Request    I have reviewed the RN documentation. My recommendation is:  Needs his GI/stomach surgeon       Vomiting coffee grounds needs to go to his gastric bypass team at the [facility].

## 2022-05-26 NOTE — TELEPHONE ENCOUNTER
Message left with homecare nurse with the information below.     Patient was already notified via [employer] to contact his bariatric team.     Aparna Holman, RN  to [facility]      8:29 AM  Dr. Poncho Wright would like your Bariatric team to address your symptoms and concerns. I can see that an RN from their team messaged you yesterday. Please contact them and coordinate a plan for whats going on.        Thank you.

## 2022-05-26 NOTE — TELEPHONE ENCOUNTER
Ericat sent to pt:  From: Demetrice Yeh RN      Created: 5/26/2022 12:54 PM        Francisco Canales,   We received refill requests for fentanyl and oxycodone.    These refills will addressed tomorrow at your visit with SAILAJA Matson CNP.     Demetrice RN-BSN  [facility]

## 2022-05-26 NOTE — PROGRESS NOTES
This is a recent snapshot of the patient's [facility] medical record.  For current drug dose and complete information and questions, call [phone] or In Basket pool, [facility] home infusion ([id number])  CSN Number:  [id number]

## 2022-05-27 ENCOUNTER — OFFICE VISIT (OUTPATIENT)
Dept: PALLIATIVE MEDICINE | Facility: CLINIC | Age: 52
End: 2022-05-27
Payer: COMMERCIAL

## 2022-05-27 VITALS — HEART RATE: 69 BPM | DIASTOLIC BLOOD PRESSURE: 101 MMHG | SYSTOLIC BLOOD PRESSURE: 156 MMHG

## 2022-05-27 DIAGNOSIS — G89.29 CHRONIC ABDOMINAL PAIN: ICD-10-CM

## 2022-05-27 DIAGNOSIS — G89.4 CHRONIC PAIN SYNDROME: ICD-10-CM

## 2022-05-27 DIAGNOSIS — R19.8 VISCERAL HYPERALGESIA: Primary | ICD-10-CM

## 2022-05-27 DIAGNOSIS — R10.9 CHRONIC ABDOMINAL PAIN: ICD-10-CM

## 2022-05-27 PROCEDURE — 99215 OFFICE O/P EST HI 40 MIN: CPT | Performed by: NURSE PRACTITIONER

## 2022-05-27 RX ORDER — OXYCODONE HCL 5 MG/5 ML
5-10 SOLUTION, ORAL ORAL 4 TIMES DAILY PRN
Qty: 1200 ML | Refills: 0 | Status: SHIPPED | OUTPATIENT
Start: 2022-05-27 | End: 2022-06-21

## 2022-05-27 RX ORDER — FENTANYL 25 UG/1
1 PATCH TRANSDERMAL
Qty: 15 PATCH | Refills: 0 | Status: SHIPPED | OUTPATIENT
Start: 2022-05-27 | End: 2022-06-21

## 2022-05-27 ASSESSMENT — PAIN SCALES - GENERAL: PAINLEVEL: MODERATE PAIN (5)

## 2022-05-27 NOTE — PROGRESS NOTES
"[facility] Consultation    Date of visit: 5/27/2022    Reason for consultation:    Parker Acevedo is a 51 year old male who is seen in consultation today for transfer of care from Dr. Jessica Milligan who is no longer practicing. Dr. Jessica Milligan was managing patient for chronic abdominal pain, severe malnutrition and long term management of opiate analgesics. He was first seen by Dr. Jsesica Milligan on 2/18/2013 and last seen by Dr. Jessica Milligan on 2/9/2022.         Primary Care Provider is Chuy Isaac  Pain medications are being prescribed by Dr. Jessica Milligan  Or her associates        Chief Complaint:  Gut/belly pain that radiates into his back and into the anterior groin over the hip joints.   Chief Complaint   Patient presents with     Pain       Pain history:  Parker Acevedo is a 51 year old male who first started having problems with pain as follows:     Pain history collected at initial visit on 5/27/2022  He had gastric bypass in 2003 and about 5 years after that, he developed gastric ulcers. He ended up having surgery for gastric ulcer and hernias and such. He has had over 11 surgeries for his abdomen. He is malnourished due to short-gut syndrome. He has a J-tube that is open to vent bile from his stomach as he gets bloated.   Worst pain is inside the abdominal cavity. He will have pain so bad that he states he screams for his mother. He feels that this is a deep inside pain.   -he would like to increase his oxycodone dose by one dose per day. His activity is limited by not having medication to cover him for his daily activities.     -he has a DVT in his right arm and in his right jugular vein. He is on lovenox.           Pain rating: intensity ranges from 3/10 to 10+/10, and Averages 5-6 on a 0-10 scale.  Describes pain as \"sharp, burning, agonizing and debilitating.\"  Pain is constant.      Home self care includes: warm packs, hot bath or hot tub.     Aggravating " factors include: overactivity. Riding in the car.     Relieving factors include: Fentanyl patches and oxycodone    Any bowel or bladder incontinence: none      Current pain-related medication treatments include:  -tylenol 32mg/ml liquid take 15.65mls (500mg) Q 4 hours PRN fever/mild pain  (somewhat helpful for headaches)  --Duragesic 25mcg/hr Q 48 hours (helpful)  -lidoderm 5% patch PRN ()  -Narcan nasal spray for opiate reversal   -Oxycodone 5mg/5ml solution take 5-10mls (5-10mg) TID PRN max of 30mg/day with 4 hours between doses.     Other pertinent medications:  -Adderall 20mg every day  -LOVENOX 120mg Subcutaneously every day  -lorazepam 1mg for anxiety with TPN and meds once per day (uses most nights)  -Zofran 8mg Q 8 hours PRN    Previous medication treatments included:  OPIATES: Fentanyl (helpful), morphine (hallucinations), Dilaudid (not helpful, did not dissolve), Tylenol #3 (not helpful), hydrocodone (not helpful), Belbuca (not helpful)  NSAIDS: cannot take due to severe gastric ulcer disease  MUSCLE RELAXANTS: none  ANTI-MIGRAINE MEDS: none  ANTI-DEPRESSANTS: none  SLEEP AIDS: benadryl (helpful)  ANTI-CONVULSANTS: gabapentin (bad headaches and acid reflux),  TOPICALS: Lidocaine patches (helpful)  ANXIOLYTICS: valium (helpful 5mg dosage), lorazepam (helpful)  MEDICAL CANNABIS: not interested  Other meds: tylenol (helpful for headaches)      Other treatments have included:  Parker Acevedo has been seen at a pain clinic in the past.  Previously managed here by Dr. Jessica Milligan. Also seen by [facility] for possible implanted intrathecal pain pump, he is not candidate due to infection risk.   PT: tried, never helped his neck or abdominal surgery  Massage Therapy: helpful for a day or two  Chiropractic care: tried, helped some   Acupuncture: tried, not helpful  TENs Unit: tried, not helpful  Healing Touch: not helpful    Injections:   -previously had injections for his neck with Dr. Jessica Milligan        Past  Medical History:  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     Complication of anesthesia      Difficulty swallowing      Gastric ulcer, unspecified as acute or chronic, without mention of hemorrhage, perforation, or obstruction      Gastro-oesophageal reflux disease      Head injury      Hiatal hernia      Other bladder disorder      Other chronic pain      PONV (postoperative nausea and vomiting)      Severe malnutrition (H)     TPN     Short gut syndrome      Tobacco abuse      Past Surgical History:  Past Surgical History:   Procedure Laterality Date     AMPUTATION       APPENDECTOMY       BACK SURGERY  11/3/2014    curve in the spine     BIOPSY LYMPH NODE CERVICAL N/A 2/20/2015    Procedure: BIOPSY LYMPH NODE CERVICAL;  Surgeon: Baron Scanlon MD;  [facility]     CHOLECYSTECTOMY       COLONOSCOPY N/A 7/14/2021    Procedure: COLONOSCOPY;  Surgeon: Jimbo Estrada MD;  Location: [facility] OR     COLONOSCOPY N/A 4/13/2022    Procedure: COLONOSCOPY;  Surgeon: Jimbo Estrada MD;  Location: [facility] OR     DISCECTOMY, FUSION CERVICAL ANTERIOR ONE LEVEL, COMBINED N/A 2/15/2017    Procedure: COMBINED DISCECTOMY, FUSION CERVICAL ANTERIOR ONE LEVEL;  Surgeon: Darren Cmapos MD;  [facility]     ENDOSCOPIC INSERTION TUBE GASTROSTOMY  9/9/2013    Procedure: ENDOSCOPIC INSERTION TUBE GASTROSTOMY;;  Surgeon: Francis Vyas MD;  [facility]     ENDOSCOPIC ULTRASOUND UPPER GASTROINTESTINAL TRACT (GI)  4/29/2011    Procedure:ENDOSCOPIC ULTRASOUND UPPER GASTROINTESTINAL TRACT (GI); Both Procedures done Conjointly; Surgeon:NEREIDA HOUSER; [facility]     ENDOSCOPIC ULTRASOUND UPPER GASTROINTESTINAL TRACT (GI)   9/9/2013    Procedure: ENDOSCOPIC ULTRASOUND UPPER GASTROINTESTINAL TRACT (GI);  Endoscopic Ultrasound Guide Gastrostomy Tube Placement  C-arm;  Surgeon: Noe Lizarraga MD;  [facility]     ENDOSCOPIC ULTRASOUND UPPER GASTROINTESTINAL TRACT (GI) N/A 2/24/2021    Procedure: ENDOSCOPIC ULTRASOUND, ESOPHAGOSCOPY / UPPER GASTROINTESTINAL TRACT (GI), esophagastrogastroduodenoscopy;  Surgeon: Berny Bach MD;  [facility]     ENDOSCOPY  03/25/11    EGD, [facility]     ENDOSCOPY  08/04/09    Upper Endoscopy, [facility]     ENDOSCOPY  01/05/09    Upper Endoscopy, [facility]     ESOPHAGOSCOPY, GASTROSCOPY, DUODENOSCOPY (EGD), COMBINED  4/20/2011    Procedure:COMBINED ESOPHAGOSCOPY, GASTROSCOPY, DUODENOSCOPY (EGD); Surgeon:BLU VYAS; [facility]     ESOPHAGOSCOPY, GASTROSCOPY, DUODENOSCOPY (EGD), COMBINED  6/15/2011    Procedure:COMBINED ESOPHAGOSCOPY, GASTROSCOPY, DUODENOSCOPY (EGD); Surgeon:BLU VYAS; [facility]     ESOPHAGOSCOPY, GASTROSCOPY, DUODENOSCOPY (EGD), COMBINED  6/12/2013    Procedure: COMBINED ESOPHAGOSCOPY, GASTROSCOPY, DUODENOSCOPY (EGD);;  Surgeon: Blu Vyas MD;  [facility]     ESOPHAGOSCOPY, GASTROSCOPY, DUODENOSCOPY (EGD), COMBINED  11/22/2013    Procedure: COMBINED ESOPHAGOSCOPY, GASTROSCOPY, DUODENOSCOPY (EGD);;  Surgeon: Blu Vyas MD;  [facility]     ESOPHAGOSCOPY, GASTROSCOPY, DUODENOSCOPY (EGD), COMBINED  4/30/2014    Procedure: COMBINED ESOPHAGOSCOPY, GASTROSCOPY, DUODENOSCOPY (EGD);  Surgeon: Blu Vyas MD;  [facility]     ESOPHAGOSCOPY, GASTROSCOPY, DUODENOSCOPY (EGD), COMBINED N/A 2/20/2015    Procedure: COMBINED ESOPHAGOSCOPY, GASTROSCOPY, DUODENOSCOPY (EGD), BIOPSY SINGLE OR MULTIPLE;  Surgeon: Baron Scanlon MD;  Location:  OR     ESOPHAGOSCOPY, GASTROSCOPY, DUODENOSCOPY (EGD), COMBINED N/A 9/30/2015    Procedure: COMBINED ESOPHAGOSCOPY, GASTROSCOPY, DUODENOSCOPY  (EGD);  Surgeon: Francis Vyas MD;  Location:  GI     ESOPHAGOSCOPY, GASTROSCOPY, DUODENOSCOPY (EGD), COMBINED N/A 10/3/2019    Procedure: Upper Endoscopy;  Surgeon: Clif Morrow MD;  [facility]     GASTRECTOMY  6/22/2012    Procedure: GASTRECTOMY;  Open Approach, Excise Ulcers,Partial Gastrectomy, Esophagojejunostomy, Hiatal Hernia Repair, Extensive Lysis of Adhesions and Esaphagogastrodudenoscopy.;  Surgeon: Francis Vyas MD;  [facility]     GASTROJEJUNOSTOMY  08/26/09    Extensice enterolysis, partial resect. jejunum, part. resect gastric pouch, gastrojejunostomy anastomosis     HC ESOPH/GAS REFLUX TEST W NASAL IMPED ELECTRODE  8/5/2013    Procedure: ESOPHAGEAL IMPEDENCE FUNCTION TEST 1 HOUR OR LESS;  Surgeon: Halie Lang MD;  Location:  GI     HEAD & NECK SURGERY  2/15/2017    C5-C6     HERNIA REPAIR  2006    Umbilical hernia     HERNIORRHAPHY HIATAL  6/22/2012    Procedure: HERNIORRHAPHY HIATAL;;  Surgeon: Francis Vyas MD;  [facility]     HERNIORRHAPHY INGUINAL  11/22/2013    Procedure: HERNIORRHAPHY INGUINAL;;  Surgeon: Francis Vyas MD;  [facility]     INSERT PICC LINE Right 12/19/2019    Procedure: Picc Placement;  Surgeon: Per Dumont PA-C;  [facility]     INSERT PICC LINE Right 2/21/2020    Procedure: INSERTION, PICC;  Surgeon: Per Dumont PA-C;  [facility]     INSERT PORT VASCULAR ACCESS Right 12/19/2017    Procedure: INSERT PORT VASCULAR ACCESS;  Right Chest Port Placement ;  Surgeon: Lisandro Alejandro PA-C;  [facility]     INSERT PORT VASCULAR ACCESS Right 8/2/2018    Procedure: INSERT PORT VASCULAR ACCESS;  Place single lumen tunneled central venous access catheter;  Surgeon: Guy Jamil PA-C;  [facility]     IR CVC TUNNEL PLACEMENT > 5 YRS OF AGE  8/7/2019     IR CVC TUNNEL PLACEMENT > 5 YRS OF AGE  4/14/2020     IR CVC TUNNEL PLACEMENT > 5 YRS OF AGE  8/3/2020     IR CVC  TUNNEL PLACEMENT > 5 YRS OF AGE  9/4/2020     IR CVC TUNNEL PLACEMENT > 5 YRS OF AGE  2/5/2021     IR CVC TUNNEL PLACEMENT > 5 YRS OF AGE  3/23/2021     IR CVC TUNNEL PLACEMENT > 5 YRS OF AGE  1/13/2022     IR CVC TUNNEL PLACEMENT > 5 YRS OF AGE  5/12/2022     IR CVC TUNNEL REMOVAL RIGHT  10/1/2019     IR CVC TUNNEL REMOVAL RIGHT  7/30/2020     IR CVC TUNNEL REMOVAL RIGHT  9/2/2020     IR CVC TUNNEL REMOVAL RIGHT  2/3/2021     IR CVC TUNNEL REMOVAL RIGHT  3/19/2021     IR CVC TUNNEL REMOVAL RIGHT  1/10/2022     IR CVC TUNNEL REMOVAL RIGHT  5/9/2022     IR CVC TUNNEL REVISION RIGHT  5/7/2021     IR FOLLOW UP VISIT OUTPATIENT  8/7/2019     IR PICC PLACEMENT > 5 YRS OF AGE  3/7/2019     IR PICC PLACEMENT > 5 YRS OF AGE  12/19/2019     IR PICC PLACEMENT > 5 YRS OF AGE  2/21/2020     LAPAROTOMY EXPLORATORY  11/22/2013    Procedure: LAPAROTOMY EXPLORATORY;  Exploratory Laparotomy, Upper Endoscopy, Left Inguinal Hernia Repair;  Surgeon: Francis Vyas MD;  [facility]     ORTHOPEDIC SURGERY       PICC INSERTION Right 03/16/2017    5fr DL BioFlo PICC, 42cm (3cm external) in the R medial brachial vein w/ tip in the SVC RA junction.     PICC INSERTION Left 09/23/2017    5fr DL BioFlo PICC, 45cm (1cm external) in the L basilic vein w/ tip in the SVC RA junction.     PICC INSERTION Right 05/16/2019    5Fr - 43cm, Medial brachial vein, low SVC     PICC INSERTION Right 10/02/2019    5Fr - 43cm (2cm external), basilic vein, low SVC     SHAYLEE EN Y BOWEL  2003     SOFT TISSUE SURGERY       THORACIC SURGERY       TONSILLECTOMY       TRANSESOPHAGEAL ECHOCARDIOGRAM INTRAOPERATIVE N/A 1/8/2019    Procedure: TRANSESOPHAGEAL ECHOCARDIOGRAM INTRAOPERATIVE;  Surgeon: GENERIC ANESTHESIA PROVIDER;  [facility]     ZC GASTRIC BYPASS,OBESE<100CM SHAYLEE-EN-Y  2002    lost 300 pounds     Medications:  Current Outpatient Medications   Medication Sig Dispense Refill     acetaminophen (TYLENOL) 32 mg/mL liquid Take 15.65 mLs (500 mg) by  mouth every 4 hours as needed for fever or mild pain 455 mL 1     amphetamine-dextroamphetamine (ADDERALL) 20 MG tablet TAKE ONE TABLET BY MOUTH ONCE DAILY 30 tablet 0     bisacodyl (DULCOLAX) 5 MG EC tablet Take as directed. One day prior to exam at 10:00am take 2 tablets 2 tablet 0     carvedilol (COREG) 6.25 MG tablet Take 12.5 mg by mouth 2 times daily (with meals)        cyanocobalamin (CYANOCOBALAMIN) 1000 MCG/ML injection INJECT 1 ML INTO THE MUSCLE EVERY 30 DAYS 1 mL 3     fentaNYL (DURAGESIC) 25 mcg/hr 72 hr patch Place 1 patch onto the skin every 48 hours remove old patch. Fill 5/27/22 and start 6/1/22. 30 days for chronic pain 15 patch 0     lactated ringers infusion Inject 1,000-2,000 mLs into the vein daily as needed       lidocaine (LIDODERM) 5 % patch Place 1-2 patches onto the skin every 24 hours Wear for 12 hours, remove for 12 hours.  OK to cut to better fit to size. 60 patch 6     LORazepam (ATIVAN) 1 MG tablet TAKE 1 TABLET (1MG) BY MOUTH AS NEEDED FOR ANXIETY WITH TPN AND MEDICATIONS . JUST ONCE A DAY (30 TO LAST 30 DAYS) 30 tablet 0     nystatin (MYCOSTATIN) 887373 UNIT/GM external cream APPLY TOPICALLY 2 TIMES DAILY 30 g 0     ondansetron (ZOFRAN-ODT) 8 MG ODT tab DISSOLVE ONE TABLET ON TONGUE EVERY 8 HOURS AS NEEDED FOR NAUSEA 90 tablet 1     oxyCODONE (ROXICODONE) 5 MG/5ML solution Take 5-10 mLs (5-10 mg) by mouth 4 times daily as needed for pain Max of 40mg/day. Put at least 4 hours between doses. Fill 5/27/22 and start 06/1/22. 30 day supply for chronic pan 1200 mL 0     pantoprazole (PROTONIX) 40 MG EC tablet Take 1 tablet (40 mg) by mouth daily 30 tablet 5     parenteral nutrition - PTA/DISCHARGE ORDER Patient receives 2050 mL TPN every 14 hours through PICC at [facility].       albuterol (PROAIR HFA/PROVENTIL HFA/VENTOLIN HFA) 108 (90 Base) MCG/ACT inhaler Inhale 2 puffs into the lungs every 6 hours as needed       enoxaparin ANTICOAGULANT (LOVENOX) 120 MG/0.8ML syringe  "Inject 0.8 mLs (120 mg) Subcutaneous daily 15 mL 0     EPINEPHrine (ANY BX GENERIC EQUIV) 0.3 MG/0.3ML injection 2-pack        [place] [facility] MANAGED PATIENT Contact [facility] for patient specific medication information at [phone] on admission and discharge from the hospital.  Phones are answered 24 hours a day 7 days a week 365 days a year.    Providers - Choose \"CONTINUE HOME MED (no script)\" at discharge if patient treatment with home infusion will continue.       insulin syringe-needle U-100 (29G X 1/2\" 1 ML) 29G X 1/2\" 1 ML miscellaneous Use to inject b12 every 30 days 3 each 4     magnesium citrate solution Take as directed. Two days prior to exam drink 10oz bottle of magnesium citrate at 4:00pm 296 mL 0     naloxone (NARCAN) 4 MG/0.1ML nasal spray Spray 1 spray (4 mg) into one nostril alternating nostrils once as needed for opioid reversal every 2-3 minutes until assistance arrives 0.2 mL 0     polyethylene glycol (GOLYTELY) 236 g suspension Take as directed. One day before your exam fill the first container with water. Cover and shake until mixed well. At 3:00pm drink one 8oz glass every 10-15 minutes until half of the first container is empty. Store the remainder in the refrigerator. At 8:00pm drink the second half of the first container until it is gone. Before you go to bed mix the second container with water and put in refrigerator. Six hours before your check in time drink one 8oz glass every 10-15 minutes until half of container is empty. Discard the remainder of solution. 8000 mL 0     polyethylene glycol (MIRALAX) 17 GM/Dose powder Take 17 g by mouth daily 1020 g 3     sucralfate (CARAFATE) 1 GM/10ML suspension TAKE 10MLS  BY MOUTH FOUR TIMES A DAY AS NEEDED 420 mL 1     vitamin D2 (ERGOCALCIFEROL) 23246 units (1250 mcg) capsule TAKE 1 CAPSULE (50,000 UNITS) BY MOUTH ONCE A WEEK 12 capsule 3     Allergies:     Allergies   Allergen Reactions     Bactrim [Sulfamethoxazole " W/Trimethoprim] Rash     Penicillins Anaphylaxis     Please see Antimicrobial Management Team allergy assessment note 10/10/2018. Patient reported tolerating amoxicillin.     Doxycycline Rash     Vancomycin Rash     Rash after receiving vancomycin 3/28/16 (infusion reaction?). Tolerated with slower infusion and diphenhydramine premed.     Social History:  Home situation:  to Vandana, one son in late 20's   Occupation/Schooling: he used to work at [employer]. He is on disability, SSDI  Tobacco use: smokes 1/2 ppd, discussed smoking cessation today, he is not ready at present time  Alcohol use: none  Drug use: none  History of chemical dependency treatment: none    Family history:  Family History   Problem Relation Age of Onset     Gastrointestinal Disease Mother         Crohns disease     Anxiety Disorder Mother      Thyroid Disease Mother         Grave's disease     Cancer Father         ear cancer-skin cancer/melanoma     Breast Cancer Maternal Grandmother      Macular Degeneration Maternal Grandfather      Anxiety Disorder Sister      Diabetes Maternal Uncle      Breast Cancer Other      Hypertension No family hx of      Hyperlipidemia No family hx of      Cerebrovascular Disease No family hx of      Prostate Cancer No family hx of      Depression No family hx of      Anesthesia Reaction No family hx of      Asthma No family hx of      Osteoporosis No family hx of      Genetic Disorder No family hx of      Obesity No family hx of      Mental Illness No family hx of      Substance Abuse No family hx of      Glaucoma No family hx of          Review of Systems:  The 14 system ROS was reviewed with the patient and is positive for: positives are in bold  Constitutional: fever/chills, fatigue, weight gain, weight loss  Eyes/Head: headache, dizziness  ENT: ringing in ears  Allergy/Immune: allergies  Skin: itching, rash, hives  Hematologic: easy bruising  Respiratory: cough, wheezing, shortness of  breath  Cardiovascular: swelling in feet (known lymphedema in both legs right < Left), fainting, palpitations, chest pain  GI: abdominal pain, nausea, vomiting, diarrhea, constipation  Endocrine: steroid use  Musculoskeletal:  joint pain, arthritis, stiffness, gout, back pain, neck pain  Urinary: frequency, urgency, incontinence, hesitancy  Neurologic: weakness, numbness/tingling, seizure, stroke, memory loss  Mental health: depression, anxiety, stress, suicidal ideation      Physical Exam:  Vitals:    05/27/22 1404   BP: (!) 156/101   Pulse: 69     Exam:  Constitutional: healthy, alert and thin  Head: normocephalic. Atraumatic.   Eyes: no redness or jaundice noted   ENT: oropharnx normal.  MMM.   Cardiovascular: RRR no m/g/r   Respiratory: clear to auscultation A/P. Respirations easy and unlabored. Able to speak in full sentences without SOB or cough noted.    Gastrointestinal: soft, diffuse tenderness  : deferred  Skin: no suspicious lesions or rashes  Psychiatric: mentation appears normal and affect normal/bright    Musculoskeletal exam:  Gait/Station/Posture: forward head, rounded shoulders. Walks with torso hunched over due to abdominal pain. Able to rise onto toes and heels. Able to perform tandem gait    Cervical spine:    Flex:  20 degrees   Ext: 20 degrees   Rotation to right: 70 degrees   Rotation to left: 70 degrees   Ext/rotation to the right pain free   Ext/rotation to the left pain free    Thoracic spine:  Normal     Lumbar spine:    Flex:  50 degrees   Ext: 5 degrees   Rotation/ext to right: pain free   Rotation/ext to left: pain free   SI joints: Non-tender bilaterally   Piriformis: Non-tender bilaterally   Greater trochanters: Non-tender bilaterally    Myofascial tenderness:  abdomen  Straight leg exam: negative  Arsalan's maneuver: negative    Neurologic exam:  CN:  Cranial nerves 2-12 are  Grossly normal  Motor:  5/5 UE and LE strength  Reflexes:     Biceps:     R:  1/4 L: 1/4   Brachioradialis    R:  1/4 L: 1/4      Patella:  R:  1/4 L: 1/4   Achilles:  R:  1/4 L: 1/4  Other reflexes:  Toes downgoing   Johnson's negative  Sensory:  (upper and lower extremities):   Light touch: normal    Vibration: normal    Pin prick: normal    Allodynia: absent    Dysethesia: absent    Hyperalgesia: absent         Diagnostic tests:  CT ABDOMEN PELVIS W CONTRAST  2/16/2019 3:00 AM      HISTORY: Right-sided abdominal pain, status post gastric bypass.  Patient has G-tube with blood and history of ulcers.     TECHNIQUE: CT abdomen and pelvis with 85 mL Isovue-370 IV. Radiation  dose for this scan was reduced using automated exposure control,  adjustment of the mA and/or kV according to patient size, or iterative  reconstruction technique.     COMPARISON: 1/13/2015.     FINDINGS:  Abdomen: There is mild dependent atelectasis at the lung bases. The  heart size is normal. Small hiatal hernia. There is mild biliary  dilatation into the pancreas head which is probably normal post  cholecystectomy. The liver otherwise appears normal. The gallbladder  is absent. The spleen, pancreas, adrenal glands and kidneys are normal  in appearance. There is a G-tube in place. No abdominal or pelvic  lymph node enlargement.     Pelvis: The ascending colon and transverse colon are very distended  with gas, stool and fluid. The descending and rectosigmoid colon are  nondilated. Transition point is in the region of the splenic flexure,  but there is no convincing obstruction. Small bowel is normal in  caliber. The appendix is normal. There is no free intraperitoneal gas  or fluid.                                                                      IMPRESSION:  1. The ascending and transverse colon are distended with gas, stool  and fluid. Distal colon is nondilated. No focal obstruction is  evident.  2. Small hiatal hernia.     IMANI HU MD      MR CERVICAL SPINE WITHOUT CONTRAST  1/2/2017  2:44 PM     HISTORY: Injury to neck 2 weeks ago with  development of suspected  radicular pain to the left upper extremity with weakness of thumb and  index finger.     COMPARISON: Plain films 12/22/2016.     TECHNIQUE: Routine MR cervical spine extended through T2.     FINDINGS: Vertebral body bone marrow signal is normal and the  alignment is normal through T2. No malignant or destructive changes.  The cervical and upper thoracic spinal cord appear intrinsically  normal through T2. Craniocervical junction region is normal. No  paraspinous soft tissue abnormality.     Findings by level as follows:     C2-C3: Negative. No disc protrusion. No central or lateral stenosis.     C3-C4: Negative.     C4-C5: Very tiny central disc protrusion. No significant central or  lateral stenosis.     C5-C6: Moderate degenerative narrowing of the interspace. Mild  broad-based disc osteophyte complex and small uncinate spurs. Minimal  central stenosis and minimal bilateral foraminal stenosis.     C6-C7: Moderate degenerative narrowing of the interspace. No disc  protrusion. No central or lateral stenosis.     C7-T1: Negative.                                                                      IMPRESSION:  1. Degenerative changes as described, most marked at C5-C6 and C6-C7.  2. No intrinsic cord abnormality through T2.     MARTHA MCCARTY MD        MR LUMBAR SPINE W/O & W CONTRAST 1/6/2019 3:49 PM     Provided History: Back pain, > 6wks conservative tx, persistent sx;  pain in the left paraspinal region- now with fungemia, persistent  blood stream infections since Oct 2018.     Comparison: No similar studies     Technique: Sagittal T1-weighted and T2-weighted and axial T2-weighted  images of the lumbar spine were obtained without intravenous contrast.  Post intravenous contrast using gadolinium axial and sagittal  T1-weighted images were obtained with fat saturation.     Contrast: 8.9CC GADAVIST     Findings: Regarding numbering convention, there are 5 lumbar-type  vertebrae assumed  for the purposes of this dictation.  The tip of the  conus medullaris is at L1.  Regarding alignment, the lumbar vertebral  column appears normally aligned.  There is no significant disc height  narrowing at any level.  Regarding bone marrow signal intensity, no  abnormality is visualized on STIR images.     On a level by level basis:     L1-2: No significant spinal canal or foraminal narrowing.     L2-3: Minimal disc bulge. Mild thickening of the ligamentum flavum. No  significant spinal canal or foraminal narrowing.     L3-4: Mild disc bulge and thickening of the ligamentum flavum with  mild spinal canal narrowing. No neural foraminal narrowing.     L4-5: Mild disc bulge and thickening of the ligamentum flavum. No  central spinal canal narrowing or neuroforaminal stenosis.      L5-S1: No significant spinal canal or foraminal narrowing.     3 bandlike areas of high STIR signal and enhancement in the right  posterior paraspinous muscles.                                                                      Impression:  1. No abnormal signal within the spine to suggest fungal infection.  Mild nonspecific enhancement and STIR hyperintensity in the right  posterior paraspinous muscles, potentially myositis.  2. Multilevel lumbar spondylosis.     I have personally reviewed the examination and initial interpretation  and I agree with the findings.     YOLA TELLEZ MD        Other testing (labs, diagnostics):  5/25/2022  Cr. 0.69  Est GFR >90      Screening tools:     DIRE Score for ongoing opioid management is calculated as follows:    Diagnosis = 2    Intractability = 2    Risk: Psych = 3  Chem Hlth = 2  Reliability = 2  Social = 2    Efficacy = 2    Total DIRE Score = 15 (14 or higher predicts good candidate for ongoing opioid management; 13 or lower predicts poor candidate for opioid management)         Assessment:  1. Visceral hyperalgesia  2. Chronic abdominal pain  3. Chronic pain syndrome  4. PMHx includes: ADHD,  anxiety, cardiomyopathy and nutritional diseases, chronic abdominal pain, central line associated bloodstream infection recurrent, anesthesia complication, difficulty swallowing, gastric ulcer unspecified as acute or chronic without mention of hemorrhage perforation or obstruction, gastroesophageal reflux disease, head injury, hiatal hernia, other bladder disorder, other chronic pain, postop nausea and vomiting, severe malnutrition on TPN, short gut syndrome, tobacco abuse  5. PSHx includes: Appendectomy, back surgery (11/3/2014), biopsy of cervical lymph node (2/20/2015), cholecystectomy, colonoscopy (2021, 2022), cervical anterior 1 level discectomy and fusion (2/15/2017), endoscopic insertion tube gastrostomy (9/9/2013), endoscopic ultrasound upper gastrointestinal tract (4/29/2011), endoscopic ultrasound upper gastrointestinal tract (9/9/2013), endoscopic ultrasound upper gastrointestinal tract (2/24/2021), endoscopy (3/25/2011, 8/4/2009, 1/5/2009), multiple EGDs, gastrectomy (6/22/2012), gastrojejunostomy (8/26/2009), umbilical hernia repair (2006), hernia raphe hiatal (6/22/2012), herniorrhaphy inguinal (11/22/2013), insert PICC line on the right (12/19/2019), insert PICC line right (2/21/2020), insert vascular access port on the right (12/19/2017, 8/2/2018), multiple interventional radiology CVC tunnel placement and removals, exploratory laparotomy (11/22/2013), orthopedic surgery, Celestino-en-Y gastric bypass (2003), tonsillectomy.        Plan:  Diagnosis reviewed, treatment option addressed, and risk/benefits discussed.  Self-care instructions given.  I am recommending a multidisciplinary treatment plan to help this patient better manage his pain.      1. Physical Therapy: none  2. Clinical Health Psychologist to address issues of relaxation, behavioral change, coping style, and other factors important to improvement: none  3. Continue gentle movement at home  4. Diagnostic Studies: none  5. Medication  Management:   1. Continue fentanyl patch 25mcg/hr every 48 hours, fill 5/27 and start 6/1  2. Starting on June 1st, you may use 5-10mg (5-10mls) every 4 hours as needed, max of 40mg (40ml) per day  6. Further procedures recommended: nne  7. Acupuncture: I am fine with this  8. Urine toxicology screen today: none  9. Signed CSA with me   10. Recommendations/follow-up for PCP:  See above  11. Release of information: none  12. Follow up: 10-12 weeks. This can be in-person or video, your choice. Please call [phone] to make your follow-up appointment with me.         Face to face time: 63 minutes      Natalie MENARD, RN CNP, FNP  [facility]  [facility] [place]

## 2022-05-27 NOTE — NURSING NOTE
The opioid contract was reviewed and signed.  Pt was given a copy. The signed one was placed in bin to be scanned into epic.        Dilma Wall RN, BSN, CMSRN  RN Care Coordinator  [facility]

## 2022-05-27 NOTE — PATIENT INSTRUCTIONS
PLAN:  Physical Therapy: none  Clinical Health Psychologist to address issues of relaxation, behavioral change, coping style, and other factors important to improvement: none  Continue gentle movement at home  Diagnostic Studies: none  Medication Management:   Continue fentanyl patch 25mcg/hr every 48 hours, fill 5/27 and start 6/1  Starting on June 1st, you may use 5-10mg (5-10mls) every 4 hours as needed, max of 40mg (40ml) per day  Further procedures recommended: nne  Acupuncture: I am fine with this  Urine toxicology screen today: none  Signed CSA with me   Recommendations/follow-up for PCP:  See above  Release of information: none  Follow up: 10-12 weeks. This can be in-person or video, your choice. Please call [phone] to make your follow-up appointment with me.     ----------------------------------------------------------------  Clinic Number:  [phone]   Call with any questions about your care and for scheduling assistance.   Calls are returned Monday through Friday between 8 AM and 4:30 PM. We usually get back to you within 2 business days depending on the issue/request.    If we are prescribing your medications:  For opioid medication refills, call the clinic or send a Clout message 7 days in advance.  Please include:  Name of requested medication  Name of the pharmacy.  For non-opioid medications, call your pharmacy directly to request a refill. Please allow 3-4 days to be processed.   Per [place] State Law:  All controlled substance prescriptions must be filled within 30 days of being written.    For those controlled substances allowing refills, pickup must occur within 30 days of last fill.      We believe regular attendance is key to your success in our program!    Any time you are unable to keep your appointment we ask that you call us at least 24 hours in advance to cancel.This will allow us to offer the appointment time to another patient.   Multiple missed appointments may lead to dismissal  from the clinic.

## 2022-05-27 NOTE — LETTER
Opioid / Opioid Plus Controlled Substance Agreement    This is an agreement between you and your provider about the safe and appropriate use of controlled substance/opioids prescribed by your care team. Controlled substances are medicines that can cause physical and mental dependence (abuse).    There are strict laws about having and using these medicines. We here at [facility] are committing to working with you in your efforts to get better. To support you in this work, we ll help you schedule regular office appointments for medicine refills. If we must cancel or change your appointment for any reason, we ll make sure you have enough medicine to last until your next appointment.     As a Provider, I will:    Listen carefully to your concerns and treat you with respect.     Recommend a treatment plan that I believe is in your best interest. This plan may involve therapies other than opioid pain medication.     Talk with you often about the possible benefits, and the risk of harm of any medicine that we prescribe for you.     Provide a plan on how to taper (discontinue or go off) using this medicine if the decision is made to stop its use.    As a Patient, I understand that opioid(s):     Are a controlled substance prescribed by my care team to help me function or work and manage my condition(s).     Are strong medicines and can cause serious side effects such as:    Drowsiness, which can seriously affect my driving ability    A lower breathing rate, enough to cause death    Harm to my thinking ability     Depression     Abuse of and addiction to this medicine    Need to be taken exactly as prescribed. Combining opioids with certain medicines or chemicals (such as illegal drugs, sedatives, sleeping pills, and benzodiazepines) can be dangerous or even fatal. If I stop opioids suddenly, I may have severe withdrawal symptoms.    Do not work for all types of pain nor for all patients. If they re not helpful, I may  be asked to stop them.        The risks, benefits and side effects of these medicine(s) were explained to me. I agree that:  1. I will take part in other treatments as advised by my care team. This may be psychiatry or counseling, physical therapy, behavioral therapy, group treatment or a referral to a specialist.     2. I will keep all my appointments. I understand that this is part of the monitoring of opioids. My care team may require an office visit for EVERY opioid/controlled substance refill. If I miss appointments or don t follow instructions, my care team may stop my medicine.    3. I will take my medicines as prescribed. I will not change the dose or schedule unless my care team tells me to. There will be no refills if I run out early.     4. I may be asked to come to the clinic and complete a urine drug test or complete a pill count at any time. If I don t give a urine sample or participate in a pill count, the care team may stop my medicine.    5. I will only receive prescriptions from this clinic for chronic pain. If I am treated by another provider for acute pain issues, I will tell them that I am taking opioid pain medication for chronic pain and that I have a treatment agreement with this provider. I will inform my [facility] care team within one business day if I am given a prescription for any pain medication by another healthcare provider. My [facility] care team can contact other providers and pharmacists about my use of any medicines.    6. It is up to me to make sure that I don t run out of my medicines on weekends or holidays. If my care team is willing to refill my opioid prescription without a visit, I must request refills only during office hours. Refills may take up to 3 business days to process. I will use one pharmacy to fill all my opioid and other controlled substance prescriptions. I will notify the clinic about any changes to my insurance or medication  availability.    7. I am responsible for my prescriptions. If the medicine/prescription is lost, stolen or destroyed, it will not be replaced. I also agree not to share controlled substance medicines with anyone.    8. I am aware I should not use any illegal or recreational drugs. I agree not to drink alcohol unless my care team says I can.       9. If I enroll in the [place] [facility] Cannabis program, I will tell my care team prior to my next refill.     10. I will tell my care team right away if I become pregnant, have a new medical problem treated outside of my regular clinic, or have a change in my medications.    11. I understand that this medicine can affect my thinking, judgment and reaction time. Alcohol and drugs affect the brain and body, which can affect the safety of my driving. Being under the influence of alcohol or drugs can affect my decision-making, behaviors, personal safety, and the safety of others. Driving while impaired (DWI) can occur if a person is driving, operating, or in physical control of a car, motorcycle, boat, snowmobile, ATV, motorbike, off-road vehicle, or any other motor vehicle ([place] Statute 169A.20). I understand the risk if I choose to drive or operate any vehicle or machinery.    I understand that if I do not follow any of the conditions above, my prescriptions or treatment may be stopped or changed.          Opioids  What You Need to Know    What are opioids?   Opioids are pain medicines that must be prescribed by a doctor. They are also known as narcotics.     Examples are:   1. morphine (MS Contin, Do)  2. oxycodone (Oxycontin)  3. oxycodone and acetaminophen (Percocet)  4. hydrocodone and acetaminophen (Vicodin, Norco)   5. fentanyl patch (Duragesic)   6. hydromorphone (Dilaudid)   7. methadone  8. codeine (Tylenol #3)     What do opioids do well?   Opioids are best for severe short-term pain such as after a surgery or injury. They may work well for cancer pain. They may  help some people with long-lasting (chronic) pain.     What do opioids NOT do well?   Opioids never get rid of pain entirely, and they don t work well for most patients with chronic pain. Opioids don t reduce swelling, one of the causes of pain.                                    Other ways to manage chronic pain and improve function include:       Treat the health problem that may be causing pain    Anti-inflammation medicines, which reduce swelling and tenderness, such as ibuprofen (Advil, Motrin) or naproxen (Aleve)    Acetaminophen (Tylenol)    Antidepressants and anti-seizure medicines, especially for nerve pain    Topical treatments such as patches or creams    Injections or nerve blocks    Chiropractic or osteopathic treatment    Acupuncture, massage, deep breathing, meditation, visual imagery, aromatherapy    Use heat or ice at the pain site    Physical therapy     Exercise    Stop smoking    Take part in therapy       Risks and side effects     Talk to your doctor before you start or decide to keep taking opioids. Possible side effects include:      Lowering your breathing rate enough to cause death    Overdose, including death, especially if taking higher than prescribed doses    Worse depression symptoms; less pleasure in things you usually enjoy    Feeling tired or sluggish    Slower thoughts or cloudy thinking    Being more sensitive to pain over time; pain is harder to control    Trouble sleeping or restless sleep    Changes in hormone levels (for example, less testosterone)    Changes in sex drive or ability to have sex    Constipation    Unsafe driving    Itching and sweating    Dizziness    Nausea, throwing up and dry mouth    What else should I know about opioids?    Opioids may lead to dependence, tolerance, or addiction.      Dependence means that if you stop or reduce the medicine too quickly, you will have withdrawal symptoms. These include loose poop (diarrhea), jitters, flu-like symptoms,  nervousness and tremors. Dependence is not the same as addiction.                       Tolerance means needing higher doses over time to get the same effect. This may increase the chance of serious side effects.      Addiction is when people improperly use a substance that harms their body, their mind or their relations with others. Use of opiates can cause a relapse of addiction if you have a history of drug or alcohol abuse.      People who have used opioids for a long time may have a lower quality of life, worse depression, higher levels of pain and more visits to doctors.    You can overdose on opioids. Take these steps to lower your risk of overdose:    1. Recognize the signs:  Signs of overdose include decrease or loss of consciousness (blackout), slowed breathing, trouble waking up and blue lips. If someone is worried about overdose, they should call 911.    2. Talk to your doctor about Narcan (naloxone).   If you are at risk for overdose, you may be given a prescription for Narcan. This medicine very quickly reverses the effects of opioids.   If you overdose, a friend or family member can give you Narcan while waiting for the ambulance. They need to know the signs of overdose and how to give Narcan.     3. Don't use alcohol or street drugs.   Taking them with opioids can cause death.    4. Do not take any of these medicines unless your doctor says it s OK. Taking these with opioids can cause death:    Benzodiazepines, such as lorazepam (Ativan), alprazolam (Xanax) or diazepam (Valium)    Muscle relaxers, such as cyclobenzaprine (Flexeril)    Sleeping pills like zolpidem (Ambien)     Other opioids      How to keep you and other people safe while taking opioids:    1. Never share your opioids with others.  Opioid medicines are regulated by the Drug Enforcement Agency ([employer]). Selling or sharing medications is a criminal act.    2. Be sure to store opioids in a secure place, locked up if possible. Young children  can easily swallow them and overdose.    3. When you are traveling with your medicines, keep them in the original bottles. If you use a pill box, be sure you also carry a copy of your medicine list from your clinic or pharmacy.    4. Safe disposal of opioids    Most pharmacies have places to get rid of medicine, called disposal kiosks. Medicine disposal options are also available in every [facility]. Search your county and  medication disposal  to find more options. You can find more details at:  [email or web address]     I agree that my provider, clinic care team, and pharmacy may work with any city, state or federal law enforcement agency that investigates the misuse, sale, or other diversion of my controlled medicine. I will allow my provider to discuss my care with, or share a copy of, this agreement with any other treating provider, pharmacy or emergency room where I receive care.    I have read this agreement and have asked questions about anything I did not understand.    _______________________________________________________  Patient Signature - Parker Acevedo _____________________                   Date     _______________________________________________________  Provider Signature - SAILAJA Johnson CNP   _____________________                   Date     _______________________________________________________  Witness Signature (required if provider not present while patient signing)   _____________________                   Date

## 2022-05-31 ENCOUNTER — LAB REQUISITION (OUTPATIENT)
Dept: LAB | Facility: CLINIC | Age: 52
End: 2022-05-31
Payer: COMMERCIAL

## 2022-05-31 ENCOUNTER — MYC MEDICAL ADVICE (OUTPATIENT)
Dept: INTERNAL MEDICINE | Facility: CLINIC | Age: 52
End: 2022-05-31
Payer: COMMERCIAL

## 2022-05-31 DIAGNOSIS — R13.10 DYSPHAGIA, UNSPECIFIED: ICD-10-CM

## 2022-05-31 DIAGNOSIS — I82.B11 THROMBOSIS OF RIGHT SUBCLAVIAN VEIN (H): ICD-10-CM

## 2022-05-31 LAB
ALBUMIN SERPL-MCNC: 3.5 G/DL (ref 3.4–5)
ALP SERPL-CCNC: 52 U/L (ref 40–150)
ALT SERPL W P-5'-P-CCNC: 34 U/L (ref 0–70)
ANION GAP SERPL CALCULATED.3IONS-SCNC: 4 MMOL/L (ref 3–14)
AST SERPL W P-5'-P-CCNC: 15 U/L (ref 0–45)
BASOPHILS # BLD AUTO: 0.1 10E3/UL (ref 0–0.2)
BASOPHILS NFR BLD AUTO: 1 %
BILIRUB DIRECT SERPL-MCNC: <0.1 MG/DL (ref 0–0.2)
BILIRUB SERPL-MCNC: 0.3 MG/DL (ref 0.2–1.3)
BUN SERPL-MCNC: 21 MG/DL (ref 7–30)
CALCIUM SERPL-MCNC: 8.3 MG/DL (ref 8.5–10.1)
CHLORIDE BLD-SCNC: 111 MMOL/L (ref 94–109)
CO2 SERPL-SCNC: 29 MMOL/L (ref 20–32)
CREAT SERPL-MCNC: 0.68 MG/DL (ref 0.66–1.25)
EOSINOPHIL # BLD AUTO: 0.2 10E3/UL (ref 0–0.7)
EOSINOPHIL NFR BLD AUTO: 3 %
ERYTHROCYTE [DISTWIDTH] IN BLOOD BY AUTOMATED COUNT: 15.5 % (ref 10–15)
FASTING STATUS PATIENT QL REPORTED: ABNORMAL
GFR SERPL CREATININE-BSD FRML MDRD: >90 ML/MIN/1.73M2
GLUCOSE BLD-MCNC: 92 MG/DL (ref 70–99)
HCT VFR BLD AUTO: 40.2 % (ref 40–53)
HGB BLD-MCNC: 13.3 G/DL (ref 13.3–17.7)
IMM GRANULOCYTES # BLD: 0 10E3/UL
IMM GRANULOCYTES NFR BLD: 1 %
LYMPHOCYTES # BLD AUTO: 2.2 10E3/UL (ref 0.8–5.3)
LYMPHOCYTES NFR BLD AUTO: 29 %
MAGNESIUM SERPL-MCNC: 2.2 MG/DL (ref 1.6–2.3)
MCH RBC QN AUTO: 31.4 PG (ref 26.5–33)
MCHC RBC AUTO-ENTMCNC: 33.1 G/DL (ref 31.5–36.5)
MCV RBC AUTO: 95 FL (ref 78–100)
MONOCYTES # BLD AUTO: 1 10E3/UL (ref 0–1.3)
MONOCYTES NFR BLD AUTO: 13 %
NEUTROPHILS # BLD AUTO: 4.2 10E3/UL (ref 1.6–8.3)
NEUTROPHILS NFR BLD AUTO: 53 %
NRBC # BLD AUTO: 0 10E3/UL
NRBC BLD AUTO-RTO: 0 /100
PHOSPHATE SERPL-MCNC: 3.2 MG/DL (ref 2.5–4.5)
PLATELET # BLD AUTO: 198 10E3/UL (ref 150–450)
POTASSIUM BLD-SCNC: 3.9 MMOL/L (ref 3.4–5.3)
PROT SERPL-MCNC: 6.7 G/DL (ref 6.8–8.8)
RBC # BLD AUTO: 4.24 10E6/UL (ref 4.4–5.9)
SODIUM SERPL-SCNC: 144 MMOL/L (ref 133–144)
TRIGL SERPL-MCNC: 164 MG/DL
WBC # BLD AUTO: 7.7 10E3/UL (ref 4–11)

## 2022-05-31 PROCEDURE — 83735 ASSAY OF MAGNESIUM: CPT | Performed by: SURGERY

## 2022-05-31 PROCEDURE — 85025 COMPLETE CBC W/AUTO DIFF WBC: CPT | Performed by: SURGERY

## 2022-05-31 PROCEDURE — 84100 ASSAY OF PHOSPHORUS: CPT | Performed by: SURGERY

## 2022-05-31 PROCEDURE — 36592 COLLECT BLOOD FROM PICC: CPT | Performed by: SURGERY

## 2022-05-31 PROCEDURE — 84478 ASSAY OF TRIGLYCERIDES: CPT | Performed by: SURGERY

## 2022-05-31 PROCEDURE — 82248 BILIRUBIN DIRECT: CPT | Performed by: SURGERY

## 2022-05-31 PROCEDURE — 80053 COMPREHEN METABOLIC PANEL: CPT | Performed by: SURGERY

## 2022-06-01 RX ORDER — ENOXAPARIN SODIUM 150 MG/ML
INJECTION SUBCUTANEOUS
Qty: 24 ML | Refills: 0 | OUTPATIENT
Start: 2022-06-01

## 2022-06-01 RX ORDER — ENOXAPARIN SODIUM 150 MG/ML
120 INJECTION SUBCUTANEOUS DAILY
Qty: 15 ML | Refills: 0 | Status: SHIPPED | OUTPATIENT
Start: 2022-06-01 | End: 2022-06-24

## 2022-06-02 ENCOUNTER — HOME INFUSION (PRE-WILLOW HOME INFUSION) (OUTPATIENT)
Dept: PHARMACY | Facility: CLINIC | Age: 52
End: 2022-06-02
Payer: COMMERCIAL

## 2022-06-03 NOTE — PROGRESS NOTES
This is a recent snapshot of the patient's [facility] medical record.  For current drug dose and complete information and questions, call [phone] or In Basket pool, fv home infusion ([id number])  CSN Number:  [id number]

## 2022-06-08 ENCOUNTER — TELEPHONE (OUTPATIENT)
Dept: INTERNAL MEDICINE | Facility: CLINIC | Age: 52
End: 2022-06-08
Payer: COMMERCIAL

## 2022-06-08 NOTE — TELEPHONE ENCOUNTER
Reason for Call:  Form, our goal is to have forms completed with 72 hours, however, some forms may require a visit or additional information.    Type of letter, form or note:  Home Health Certification    Who is the form from?: Home care    Where did the form come from: form was faxed in    What clinic location was the form placed at?: [facility]     Where the form was placed: given to GLORIA HARRISON    What number is listed as a contact on the form?: [phone] fax [phone]       Additional comments: cert period 5/26/2022-7/24/2022    Call taken on 6/8/2022 at 11:11 AM by Rosamaria Yost MA

## 2022-06-14 ENCOUNTER — LAB REQUISITION (OUTPATIENT)
Dept: LAB | Facility: CLINIC | Age: 52
End: 2022-06-14
Payer: COMMERCIAL

## 2022-06-14 ENCOUNTER — HOME INFUSION (PRE-WILLOW HOME INFUSION) (OUTPATIENT)
Dept: PHARMACY | Facility: CLINIC | Age: 52
End: 2022-06-14

## 2022-06-14 DIAGNOSIS — R13.10 DYSPHAGIA, UNSPECIFIED: ICD-10-CM

## 2022-06-14 LAB
ALBUMIN SERPL-MCNC: 3.2 G/DL (ref 3.4–5)
ALP SERPL-CCNC: 50 U/L (ref 40–150)
ALT SERPL W P-5'-P-CCNC: 21 U/L (ref 0–70)
ANION GAP SERPL CALCULATED.3IONS-SCNC: 3 MMOL/L (ref 3–14)
AST SERPL W P-5'-P-CCNC: 11 U/L (ref 0–45)
BASOPHILS # BLD AUTO: 0 10E3/UL (ref 0–0.2)
BASOPHILS NFR BLD AUTO: 1 %
BILIRUB DIRECT SERPL-MCNC: <0.1 MG/DL (ref 0–0.2)
BILIRUB SERPL-MCNC: 0.3 MG/DL (ref 0.2–1.3)
BILIRUB SERPL-MCNC: 0.3 MG/DL (ref 0.2–1.3)
BUN SERPL-MCNC: 13 MG/DL (ref 7–30)
CALCIUM SERPL-MCNC: 8 MG/DL (ref 8.5–10.1)
CHLORIDE BLD-SCNC: 110 MMOL/L (ref 94–109)
CO2 SERPL-SCNC: 31 MMOL/L (ref 20–32)
CREAT SERPL-MCNC: 0.8 MG/DL (ref 0.66–1.25)
EOSINOPHIL # BLD AUTO: 0.2 10E3/UL (ref 0–0.7)
EOSINOPHIL NFR BLD AUTO: 5 %
ERYTHROCYTE [DISTWIDTH] IN BLOOD BY AUTOMATED COUNT: 14.8 % (ref 10–15)
GFR SERPL CREATININE-BSD FRML MDRD: >90 ML/MIN/1.73M2
GLUCOSE BLD-MCNC: 74 MG/DL (ref 70–99)
HCT VFR BLD AUTO: 36.5 % (ref 40–53)
HGB BLD-MCNC: 11.7 G/DL (ref 13.3–17.7)
IMM GRANULOCYTES # BLD: 0 10E3/UL
IMM GRANULOCYTES NFR BLD: 0 %
LYMPHOCYTES # BLD AUTO: 1.9 10E3/UL (ref 0.8–5.3)
LYMPHOCYTES NFR BLD AUTO: 39 %
MAGNESIUM SERPL-MCNC: 2.1 MG/DL (ref 1.6–2.3)
MCH RBC QN AUTO: 31.2 PG (ref 26.5–33)
MCHC RBC AUTO-ENTMCNC: 32.1 G/DL (ref 31.5–36.5)
MCV RBC AUTO: 97 FL (ref 78–100)
MONOCYTES # BLD AUTO: 0.7 10E3/UL (ref 0–1.3)
MONOCYTES NFR BLD AUTO: 14 %
NEUTROPHILS # BLD AUTO: 2.1 10E3/UL (ref 1.6–8.3)
NEUTROPHILS NFR BLD AUTO: 41 %
NRBC # BLD AUTO: 0 10E3/UL
NRBC BLD AUTO-RTO: 0 /100
PHOSPHATE SERPL-MCNC: 4 MG/DL (ref 2.5–4.5)
PLATELET # BLD AUTO: 149 10E3/UL (ref 150–450)
POTASSIUM BLD-SCNC: 3.4 MMOL/L (ref 3.4–5.3)
PROT SERPL-MCNC: 6.1 G/DL (ref 6.8–8.8)
RBC # BLD AUTO: 3.75 10E6/UL (ref 4.4–5.9)
SODIUM SERPL-SCNC: 144 MMOL/L (ref 133–144)
TRIGL SERPL-MCNC: 68 MG/DL
WBC # BLD AUTO: 4.9 10E3/UL (ref 4–11)

## 2022-06-14 PROCEDURE — 80053 COMPREHEN METABOLIC PANEL: CPT | Performed by: SURGERY

## 2022-06-14 PROCEDURE — 83735 ASSAY OF MAGNESIUM: CPT | Performed by: SURGERY

## 2022-06-14 PROCEDURE — 36592 COLLECT BLOOD FROM PICC: CPT | Performed by: SURGERY

## 2022-06-14 PROCEDURE — 82248 BILIRUBIN DIRECT: CPT | Performed by: SURGERY

## 2022-06-14 PROCEDURE — 85025 COMPLETE CBC W/AUTO DIFF WBC: CPT | Performed by: SURGERY

## 2022-06-14 PROCEDURE — 84100 ASSAY OF PHOSPHORUS: CPT | Performed by: SURGERY

## 2022-06-14 PROCEDURE — 84478 ASSAY OF TRIGLYCERIDES: CPT | Performed by: SURGERY

## 2022-06-15 ENCOUNTER — HOME INFUSION (PRE-WILLOW HOME INFUSION) (OUTPATIENT)
Dept: PHARMACY | Facility: CLINIC | Age: 52
End: 2022-06-15

## 2022-06-15 DIAGNOSIS — Z53.9 DIAGNOSIS NOT YET DEFINED: Primary | ICD-10-CM

## 2022-06-15 PROCEDURE — G0179 MD RECERTIFICATION HHA PT: HCPCS | Performed by: INTERNAL MEDICINE

## 2022-06-16 ENCOUNTER — HOME INFUSION (PRE-WILLOW HOME INFUSION) (OUTPATIENT)
Dept: PHARMACY | Facility: CLINIC | Age: 52
End: 2022-06-16

## 2022-06-17 NOTE — PROGRESS NOTES
This is a recent snapshot of the patient's [facility] medical record.  For current drug dose and complete information and questions, call [phone] or In Basket pool, fv home infusion ([id number])  CSN Number:  [id number]

## 2022-06-21 ENCOUNTER — PATIENT OUTREACH (OUTPATIENT)
Dept: CARE COORDINATION | Facility: CLINIC | Age: 52
End: 2022-06-21
Payer: COMMERCIAL

## 2022-06-21 DIAGNOSIS — G89.4 CHRONIC PAIN SYNDROME: ICD-10-CM

## 2022-06-21 DIAGNOSIS — R06.02 SOB (SHORTNESS OF BREATH): Primary | ICD-10-CM

## 2022-06-21 DIAGNOSIS — G89.29 CHRONIC ABDOMINAL PAIN: ICD-10-CM

## 2022-06-21 DIAGNOSIS — F90.0 ADHD (ATTENTION DEFICIT HYPERACTIVITY DISORDER), INATTENTIVE TYPE: ICD-10-CM

## 2022-06-21 DIAGNOSIS — R10.9 CHRONIC ABDOMINAL PAIN: ICD-10-CM

## 2022-06-21 RX ORDER — ALBUTEROL SULFATE 90 UG/1
2 AEROSOL, METERED RESPIRATORY (INHALATION) EVERY 6 HOURS PRN
Qty: 18 G | Refills: 1 | Status: SHIPPED | OUTPATIENT
Start: 2022-06-21 | End: 2023-06-01

## 2022-06-21 ASSESSMENT — ACTIVITIES OF DAILY LIVING (ADL): DEPENDENT_IADLS:: TRANSPORTATION

## 2022-06-21 NOTE — TELEPHONE ENCOUNTER
Received call from patient requesting refill(s) of      fentaNYL (DURAGESIC) 25 mcg/hr 72 hr patch   Last dispensed from pharmacy on 05/27/22     oxyCODONE (ROXICODONE) 5 MG/5ML solution  Last dispensed from pharmacy on 05/27/22    Patient's last office/virtual visit by prescribing provider on 05/27/22  Next office/virtual appointment scheduled for None    Last urine drug screen date 01/05/22  Current opioid agreement on file (completed within the last year) Yes Date of opioid agreement: 05/27/22    E-prescribe to   [facility] [place], [place] - [address] [place] [id number]  Phone: [phone] Fax: [phone]    Will route to nursing [facility] for review and preparation of prescription(s).       Deidra Banks MA  [facility]

## 2022-06-21 NOTE — PROGRESS NOTES
This is a recent snapshot of the patient's [facility] medical record.  For current drug dose and complete information and questions, call [phone] or In Basket pool, fv home infusion ([id number])  CSN Number:  [id number]

## 2022-06-21 NOTE — TELEPHONE ENCOUNTER
Medication refill information reviewed. Outreach X1. Left a VM requesting call back to schedule follow up Can be in clinic or video. Provided call back number.      Due date for fentaNYL (DURAGESIC) 25 mcg/hr 72 hr patch and oxyCODONE (ROXICODONE) 5 MG/5ML solution is 07/01/22     Prescriptions prepped for review.     Will route to provider.

## 2022-06-21 NOTE — PROGRESS NOTES
"Clinic Care Coordination Contact    Follow Up Progress Note      Assessment:   -RN CC spoke with spouse Rose (consent to communicate on file), she reports patient has been doing \"Pretty good.\" Only one appointment over the last 4 weeks has been a nice relief in the setting of many appointments to attend on a regular basis.   -5/27/22 had establish care visit with pain clinic. Patients Gut/belly pain that radiates into his back and into the anterior groin over the hip joints has decreased with the oxycodone dose increase from 5 mg to 10 mg TID PRN max of 30mg/day with 4 hours between doses.   -Oxycodone 5mg/5ml solution take 5-10mls (5-10mg) TID PRN max of 30mg/day with 4 hours between doses.   -patient/family feeling supported between home care and infusion services.     Care Gaps: discussed patients current AWE goal. This is something they are aware of.     Health Maintenance Due   Topic Date Due     MEDICARE ANNUAL WELLNESS VISIT  06/21/2017     COVID-19 Vaccine (3 - Booster for Moderna series) 01/09/2022       Goals addressed this encounter:    Goals Addressed                    This Visit's Progress       General       Medical (pt-stated)   0%      Goal Statement: I will complete my annual wellness visit.  Date Goal set: 5/20/22  Barriers: complex care, and high volume of appointments at baseline  Strengths: wife is pts main caregiver, and organizes his appointments.   Date to Achieve By: 9/2022  Patient expressed understanding of goal: yes  Action steps to achieve this goal:  1. I will schedule my annual wellness visit; [phone] ([phone])  2. I will attend my annual wellness visit.  3. I will contact my Care Management or clinic team if I have barriers to attending my annual wellness visit.              Intervention/Education provided during outreach: Discussed patients plan of care. CC RN asked open ended questions, provided support, resources, and encouragement as needed. Patient will reach out to " care team sooner than planned with new questions or concerns.    Outreach Frequency: monthly    Plan:   Patient and wife to schedule AWE via [facility] or by calling    Care Coordinator will follow up in 1 month.  Jami Blank RN, BSN, PHN Care Coordinator  [facility]   Phone: [phone]

## 2022-06-21 NOTE — LETTER
[facility]  Patient Centered Plan of Care  About Me:        Patient Name:  Parker Acevedo    YOB: 1970  Age:         51 year old   [facility] MRN:    [id number] Telephone Information:  Home Phone [phone]   Mobile none       Address:  [address] [place] [address] [address] [place] [address] Email address:  [email or web address]      Emergency Contact(s)    Name Relationship Lgl Grd Work Phone Home Phone Mobile Phone   1. RACHEL, BERTRAND* Spouse No  [phone]    2. JEYSON CAIN * Relative No  [phone]   3. [place]* Mother No  [phone]           Primary language:  English     needed? No   [facility] [employer] [facility]:  [phone] op. 1  Other communication barriers:Glasses; Physical impairment    Preferred Method of Communication:  MyChart  Current living arrangement: I live in a private home with spouse    Mobility Status/ Medical Equipment: Independent        Health Maintenance  Health Maintenance Reviewed: Due/Overdue   Health Maintenance Due   Topic Date Due     MEDICARE ANNUAL WELLNESS VISIT  06/21/2017     COVID-19 Vaccine (3 - Booster for Moderna series) 01/09/2022          My Access Plan  Medical Emergency 911   Primary Clinic Line [facility] - [phone]   24 Hour Appointment Line [phone] or  [phone]) (toll-free)   24 Hour Nurse Line [phone] (toll-free)   Preferred Urgent Care Other     Preferred Hospital [facility]  [phone]     Pref[place] [facility] [place] [address] [place] [address]     Behavioral Health Crisis Line The National Suicide Prevention Lifeline at 1-848.630.1130 or 911             My Care Team Members  Patient Care Team       Relationship Specialty Notifications Start End    Chuy Isaac MD PCP - General Internal Medicine  10/30/18     Phone: [phone] Fax: [phone] [address] [facility]  [facility] [address]    Patti Milligan MD (Inactive) MD Pain Clinic  6/2/15     Phone: [phone] Fax: [phone]         [address] [facility] [address] [facility] [address]    Chuy Isaac MD Assigned PCP   11/11/18     Phone: [phone] Fax: [phone]         [address] [facility] [address]    Marlyn Jenkins MD MD Ophthalmology  1/29/19     Phone: [phone] Fax: [phone]         [address] [facility] [address] [facility] [address]    Marlyn Jenkins MD MD Ophthalmology  2/11/19     Phone: [phone] Fax: [phone]         [address] [facility] [address] [facility] [address]    Francis Vyas MD Assigned Surgical Provider   10/23/20     Phone: [phone] Fax: [phone]         [address] [facility] [address] [facility] [address]    Марина Buckner MD Assigned Infectious Disease Provider   4/24/22     Phone: [phone] Fax: [phone]         [address] [facility] [address] [facility] [address]    Jami Blank, RN Lead Care Coordinator Primary Care - CC Admissions 5/13/22     Phone: [phone] Fax: [phone]        Carlos Sorensen RN Registered Nurse Infectious Diseases All results 5/16/22     OPAT Care Coordination RN  [facility]   Fax: [phone] Ph: [phone]            My Care Plans  Self Management and Treatment Plan  Goals and (Comments)   Goals        General     Medical (pt-stated)      Notes - Note created  5/20/2022  1:52 PM by Jami Blank, RN     Goal Statement: I will complete my annual wellness visit.  Date Goal set: 5/20/22  Barriers: complex care, and high volume of appointments at baseline  Strengths: wife is pts main caregiver, and organizes his appointments.   Date to Achieve By: 9/2022  Patient expressed understanding of goal: yes  Action steps to achieve this goal:  1. I will schedule my annual wellness visit; [phone])  2. I will attend my annual wellness visit.  3. I will contact  my Care Management or clinic team if I have barriers to attending my annual wellness visit.                 Action Plans on File:                       Advance Care Plans/Directives Type:   Advanced Directive - On File      My Medical and Care Information  Problem List   Patient Active Problem List   Diagnosis     Peptic ulcer disease     Gastric bypass status for obesity     Chronic abdominal pain     Vomiting     Anemia     ADHD (attention deficit hyperactivity disorder), inattentive type     Vitamin B12 deficiency without anemia     Thiamine deficiency     Dehydration     Dysphagia     Weight loss, non-intentional     Malnutrition (H)     Chronic anxiety     Constipation     Bile reflux esophagitis     Former smoker     Vitamin D deficiency     Iron deficiency     Health Care Home     Chronic pain     Insomnia     Positive blood culture     Fungemia     Chronic nausea     Short gut syndrome     Anxiety     Status post cervical spinal arthrodesis     Port or reservoir infection, initial encounter     Abnormal echocardiogram     Low serum iron     Anemia, iron deficiency     Catheter-related bloodstream infection (CRBSI)     Generalized weakness     S/P bariatric surgery     Hyperlipidemia LDL goal <130     Bacteremia     Infection by Candida species     PICC line infiltration, sequela     Gram-positive bacteremia     On total parenteral nutrition     Gastric outlet obstruction     Short bowel syndrome     Bloodstream infection associated with central venous catheter, initial encounter     Fever, unknown origin     Acute deep vein thrombosis (DVT) of axillary vein of right upper extremity (H)     Bacteremia associated with intravascular line, initial encounter (H)        Frequency of Care Coordination: monthly     Form Last Updated: 06/21/2022

## 2022-06-21 NOTE — TELEPHONE ENCOUNTER
Patient requesting refill     fentaNYL (DURAGESIC) 25 mcg/hr 72 hr patch (applied every 48hrs)    and     oxyCODONE (ROXICODONE) 5 MG/5ML solution    Pharm[place] [facility] [place] - [address] [facility]    Fax from pharmacy    Natalie Gao  [facility] Patient Facilitator

## 2022-06-22 RX ORDER — OXYCODONE HCL 5 MG/5 ML
5-10 SOLUTION, ORAL ORAL 4 TIMES DAILY PRN
Qty: 1200 ML | Refills: 0 | Status: SHIPPED | OUTPATIENT
Start: 2022-06-22 | End: 2022-07-20

## 2022-06-22 RX ORDER — FENTANYL 25 UG/1
1 PATCH TRANSDERMAL
Qty: 15 PATCH | Refills: 0 | Status: ON HOLD | OUTPATIENT
Start: 2022-06-22 | End: 2022-07-13

## 2022-06-22 NOTE — PROGRESS NOTES
This is a recent snapshot of the patient's [facility] medical record.  For current drug dose and complete information and questions, call [phone] or In Basket pool, fv home infusion ([id number])  CSN Number:  [id number]

## 2022-06-22 NOTE — TELEPHONE ENCOUNTER
Follow up scheduled for 08/23/22.    YADIRA LazarN, RN  Care Coordinator  [facility] [place]

## 2022-06-22 NOTE — TELEPHONE ENCOUNTER
Signed Prescriptions:                        Disp   Refills    fentaNYL (DURAGESIC) 25 mcg/hr 72 hr patch 15 pat*0        Sig: Place 1 patch onto the skin every 48 hours remove old           patch. Fill 06/29/22 and start 07/01/22. 30 days           for chronic pain  Authorizing Provider: NATALIE MCDOWELL    oxyCODONE (ROXICODONE) 5 MG/5ML solution   1200 mL0        Sig: Take 5-10 mLs (5-10 mg) by mouth 4 times daily as           needed for pain Max of 40mg/day. Put at least 4           hours between doses. Fill 06/29/22 and start           07/01/22. 30 day supply for chronic pan  Authorizing Provider: NATALIE MCDOWELL    Reviewed [place]  June 22, 2022- no concerning fills.    Natalie MENARD, RN CNP, FNP  [facility]  [facility]

## 2022-06-23 ENCOUNTER — HOME INFUSION (PRE-WILLOW HOME INFUSION) (OUTPATIENT)
Dept: PHARMACY | Facility: CLINIC | Age: 52
End: 2022-06-23

## 2022-06-23 ENCOUNTER — MYC MEDICAL ADVICE (OUTPATIENT)
Dept: INTERNAL MEDICINE | Facility: CLINIC | Age: 52
End: 2022-06-23

## 2022-06-23 NOTE — TELEPHONE ENCOUNTER
Responded to fuad.   Aravind Grimm, BSN, RN, PHN  [facility] RN for [place]  June 23, 2022

## 2022-06-23 NOTE — TELEPHONE ENCOUNTER
Pending Prescriptions:                       Disp   Refills    ADDERALL 20 MG tablet [Pharmacy Med Name: *30 tab*0        Sig: TAKE ONE TABLET BY MOUTH ONCE DAILY    LORazepam (ATIVAN) 1 MG tablet [Pharmacy M*30 tab*0        Sig: TAKE 1 TABLET (1MG) BY MOUTH ONCE A DAY AS NEEDED FOR           ANXIETY WITH TPN AND MEDICATIONS . 30 TO LAST 30           DAYS    Routing refill request to provider for review/approval because:  Drug not on the [facility] refill protocol

## 2022-06-23 NOTE — PROGRESS NOTES
This is a recent snapshot of the patient's [facility] medical record.  For current drug dose and complete information and questions, call [phone] or In Basket pool, fv home infusion ([id number])  CSN Number:  [id number]

## 2022-06-24 ENCOUNTER — HOME INFUSION (PRE-WILLOW HOME INFUSION) (OUTPATIENT)
Dept: PHARMACY | Facility: CLINIC | Age: 52
End: 2022-06-24

## 2022-06-24 DIAGNOSIS — I82.B11 THROMBOSIS OF RIGHT SUBCLAVIAN VEIN (H): ICD-10-CM

## 2022-06-24 RX ORDER — LORAZEPAM 1 MG/1
TABLET ORAL
Qty: 30 TABLET | Refills: 0 | Status: SHIPPED | OUTPATIENT
Start: 2022-06-24 | End: 2022-07-22

## 2022-06-24 RX ORDER — DEXTROAMPHETAMINE SACCHARATE, AMPHETAMINE ASPARTATE, DEXTROAMPHETAMINE SULFATE AND AMPHETAMINE SULFATE 5; 5; 5; 5 MG/1; MG/1; MG/1; MG/1
TABLET ORAL
Qty: 30 TABLET | Refills: 0 | Status: SHIPPED | OUTPATIENT
Start: 2022-06-24 | End: 2022-07-22

## 2022-06-24 RX ORDER — ENOXAPARIN SODIUM 150 MG/ML
INJECTION SUBCUTANEOUS
Qty: 12 ML | Refills: 0 | Status: ON HOLD | OUTPATIENT
Start: 2022-06-24 | End: 2022-07-13

## 2022-06-24 NOTE — PROGRESS NOTES
This is a recent snapshot of the patient's [facility] medical record.  For current drug dose and complete information and questions, call [phone] or In Basket pool, fv home infusion ([id number])  CSN Number:  [id number]

## 2022-06-24 NOTE — TELEPHONE ENCOUNTER
Pending Prescriptions:                       Disp   Refills    enoxaparin ANTICOAGULANT (LOVENOX) 120 MG/*12 mL  0        Sig: INJECT THE CONTENT OF ONE SYRINGE 0.8 MLS (120 MG)           UNDER THE SKIN ONCE DAILY    Routing refill request to provider for review/approval because:  Drug not on the [facility] refill protocol     Requested Prescriptions   Pending Prescriptions Disp Refills    enoxaparin ANTICOAGULANT (LOVENOX) 120 MG/0.8ML syringe [Pharmacy Med Name: ENOXAPARIN SODIUM 120MG/0.8ML SOSY] 12 mL 0     Sig: INJECT THE CONTENT OF ONE SYRINGE 0.8 MLS (120 MG) UNDER THE SKIN ONCE DAILY        There is no refill protocol information for this order

## 2022-06-28 ENCOUNTER — HOME INFUSION (PRE-WILLOW HOME INFUSION) (OUTPATIENT)
Dept: PHARMACY | Facility: CLINIC | Age: 52
End: 2022-06-28

## 2022-06-29 ENCOUNTER — LAB REQUISITION (OUTPATIENT)
Dept: LAB | Facility: CLINIC | Age: 52
End: 2022-06-29
Payer: COMMERCIAL

## 2022-06-29 ENCOUNTER — HOME INFUSION (PRE-WILLOW HOME INFUSION) (OUTPATIENT)
Dept: PHARMACY | Facility: CLINIC | Age: 52
End: 2022-06-29

## 2022-06-29 DIAGNOSIS — R13.0 APHAGIA: ICD-10-CM

## 2022-06-29 LAB
ALBUMIN SERPL-MCNC: 3.4 G/DL (ref 3.4–5)
ALP SERPL-CCNC: 55 U/L (ref 40–150)
ALT SERPL W P-5'-P-CCNC: 29 U/L (ref 0–70)
ANION GAP SERPL CALCULATED.3IONS-SCNC: 5 MMOL/L (ref 3–14)
AST SERPL W P-5'-P-CCNC: 14 U/L (ref 0–45)
BASOPHILS # BLD AUTO: 0.1 10E3/UL (ref 0–0.2)
BASOPHILS NFR BLD AUTO: 1 %
BILIRUB DIRECT SERPL-MCNC: <0.1 MG/DL (ref 0–0.2)
BILIRUB SERPL-MCNC: 0.3 MG/DL (ref 0.2–1.3)
BUN SERPL-MCNC: 17 MG/DL (ref 7–30)
CALCIUM SERPL-MCNC: 7.7 MG/DL (ref 8.5–10.1)
CHLORIDE BLD-SCNC: 111 MMOL/L (ref 94–109)
CO2 SERPL-SCNC: 28 MMOL/L (ref 20–32)
CREAT SERPL-MCNC: 0.75 MG/DL (ref 0.66–1.25)
EOSINOPHIL # BLD AUTO: 0.2 10E3/UL (ref 0–0.7)
EOSINOPHIL NFR BLD AUTO: 3 %
ERYTHROCYTE [DISTWIDTH] IN BLOOD BY AUTOMATED COUNT: 15.2 % (ref 10–15)
GFR SERPL CREATININE-BSD FRML MDRD: >90 ML/MIN/1.73M2
GLUCOSE BLD-MCNC: 74 MG/DL (ref 70–99)
HCT VFR BLD AUTO: 37.6 % (ref 40–53)
HGB BLD-MCNC: 12.6 G/DL (ref 13.3–17.7)
IMM GRANULOCYTES # BLD: 0 10E3/UL
IMM GRANULOCYTES NFR BLD: 0 %
LYMPHOCYTES # BLD AUTO: 2.9 10E3/UL (ref 0.8–5.3)
LYMPHOCYTES NFR BLD AUTO: 37 %
MCH RBC QN AUTO: 31.8 PG (ref 26.5–33)
MCHC RBC AUTO-ENTMCNC: 33.5 G/DL (ref 31.5–36.5)
MCV RBC AUTO: 95 FL (ref 78–100)
MONOCYTES # BLD AUTO: 1 10E3/UL (ref 0–1.3)
MONOCYTES NFR BLD AUTO: 13 %
NEUTROPHILS # BLD AUTO: 3.7 10E3/UL (ref 1.6–8.3)
NEUTROPHILS NFR BLD AUTO: 46 %
NRBC # BLD AUTO: 0 10E3/UL
NRBC BLD AUTO-RTO: 0 /100
PLATELET # BLD AUTO: 169 10E3/UL (ref 150–450)
POTASSIUM BLD-SCNC: 3.6 MMOL/L (ref 3.4–5.3)
PROT SERPL-MCNC: 6.5 G/DL (ref 6.8–8.8)
RBC # BLD AUTO: 3.96 10E6/UL (ref 4.4–5.9)
SODIUM SERPL-SCNC: 144 MMOL/L (ref 133–144)
WBC # BLD AUTO: 7.8 10E3/UL (ref 4–11)

## 2022-06-29 PROCEDURE — 85025 COMPLETE CBC W/AUTO DIFF WBC: CPT | Performed by: SURGERY

## 2022-06-29 PROCEDURE — 36592 COLLECT BLOOD FROM PICC: CPT | Performed by: SURGERY

## 2022-06-29 PROCEDURE — 84478 ASSAY OF TRIGLYCERIDES: CPT | Performed by: SURGERY

## 2022-06-29 PROCEDURE — 80053 COMPREHEN METABOLIC PANEL: CPT | Performed by: SURGERY

## 2022-06-29 PROCEDURE — 82248 BILIRUBIN DIRECT: CPT | Performed by: SURGERY

## 2022-06-29 PROCEDURE — 83735 ASSAY OF MAGNESIUM: CPT | Performed by: SURGERY

## 2022-06-29 PROCEDURE — 84100 ASSAY OF PHOSPHORUS: CPT | Performed by: SURGERY

## 2022-06-30 ENCOUNTER — HOME INFUSION (PRE-WILLOW HOME INFUSION) (OUTPATIENT)
Dept: PHARMACY | Facility: CLINIC | Age: 52
End: 2022-06-30

## 2022-06-30 NOTE — PROGRESS NOTES
This is a recent snapshot of the patient's [facility] medical record.  For current drug dose and complete information and questions, call [phone] or In Basket pool, fv home infusion ([id number])  CSN Number:  [id number]

## 2022-06-30 NOTE — PROGRESS NOTES
This is a recent snapshot of the patient's [facility] medical record.  For current drug dose and complete information and questions, call [phone] or In Basket pool, fv home infusion ([id number])  CSN Number:  [id number]

## 2022-07-01 LAB
FASTING STATUS PATIENT QL REPORTED: NORMAL
MAGNESIUM SERPL-MCNC: 2.3 MG/DL (ref 1.6–2.3)
PHOSPHATE SERPL-MCNC: 3.8 MG/DL (ref 2.5–4.5)
TRIGL SERPL-MCNC: 101 MG/DL

## 2022-07-01 NOTE — PROGRESS NOTES
This is a recent snapshot of the patient's [facility] medical record.  For current drug dose and complete information and questions, call [phone] or In Basket pool, fv home infusion ([id number])  CSN Number:  [id number]

## 2022-07-02 ENCOUNTER — APPOINTMENT (OUTPATIENT)
Dept: GENERAL RADIOLOGY | Facility: CLINIC | Age: 52
End: 2022-07-02
Attending: FAMILY MEDICINE
Payer: COMMERCIAL

## 2022-07-02 ENCOUNTER — HOSPITAL ENCOUNTER (EMERGENCY)
Facility: CLINIC | Age: 52
Discharge: HOME | End: 2022-07-02
Attending: PHYSICIAN ASSISTANT | Admitting: PHYSICIAN ASSISTANT
Payer: COMMERCIAL

## 2022-07-02 VITALS
TEMPERATURE: 97.8 F | HEART RATE: 91 BPM | BODY MASS INDEX: 24.41 KG/M2 | RESPIRATION RATE: 18 BRPM | DIASTOLIC BLOOD PRESSURE: 88 MMHG | OXYGEN SATURATION: 99 % | SYSTOLIC BLOOD PRESSURE: 129 MMHG | WEIGHT: 175 LBS

## 2022-07-02 DIAGNOSIS — S93.402A LEFT ANKLE SPRAIN: ICD-10-CM

## 2022-07-02 PROCEDURE — 99284 EMERGENCY DEPT VISIT MOD MDM: CPT | Performed by: PHYSICIAN ASSISTANT

## 2022-07-02 PROCEDURE — 99284 EMERGENCY DEPT VISIT MOD MDM: CPT | Mod: 25

## 2022-07-02 PROCEDURE — 73610 X-RAY EXAM OF ANKLE: CPT | Mod: LT

## 2022-07-02 PROCEDURE — 73630 X-RAY EXAM OF FOOT: CPT | Mod: LT

## 2022-07-02 PROCEDURE — 250N000013 HC RX MED GY IP 250 OP 250 PS 637: Performed by: PHYSICIAN ASSISTANT

## 2022-07-02 RX ORDER — HYDROCODONE BITARTRATE AND ACETAMINOPHEN 5; 325 MG/1; MG/1
2 TABLET ORAL ONCE
Status: COMPLETED | OUTPATIENT
Start: 2022-07-02 | End: 2022-07-02

## 2022-07-02 RX ADMIN — HYDROCODONE BITARTRATE AND ACETAMINOPHEN 2 TABLET: 5; 325 TABLET ORAL at 18:55

## 2022-07-02 NOTE — ED PROVIDER NOTES
"  History     Chief Complaint   Patient presents with     Foot Injury       HPI  Parker Acevedo is a 51 year old male who presents to the emergency department complaining of left ankle and foot pain.  Patient reports he was in his kitchen and the floor was wet, causing him to slip and roll his left ankle and foot \"all over the place\".  He has had pain throughout the foot and ankle since then.  He has not taken anything for pain.  He has a set of crutches here that he is using to get around.  He denies falling or hitting his head or sustaining any other injuries.        Allergies:  Allergies   Allergen Reactions     Bactrim [Sulfamethoxazole W/Trimethoprim] Rash     Penicillins Anaphylaxis     Please see Antimicrobial Management Team allergy assessment note 10/10/2018. Patient reported tolerating amoxicillin.     Doxycycline Rash     Vancomycin Rash     Rash after receiving vancomycin 3/28/16 (infusion reaction?). Tolerated with slower infusion and diphenhydramine premed.       Problem List:    Patient Active Problem List    Diagnosis Date Noted     Bacteremia associated with intravascular line, initial encounter (H) 05/07/2022     Priority: Medium     Acute deep vein thrombosis (DVT) of axillary vein of right upper extremity (H) 02/28/2022     Priority: Medium     Fever, unknown origin 03/19/2021     Priority: Medium     Short bowel syndrome 01/30/2021     Priority: Medium     Bloodstream infection associated with central venous catheter, initial encounter 01/30/2021     Priority: Medium     Gastric outlet obstruction 10/07/2020     Priority: Medium     Added automatically from request for surgery [id number]       Gram-positive bacteremia 09/29/2019     Priority: Medium     On total parenteral nutrition 09/29/2019     Priority: Medium     Added automatically from request for surgery [id number]       PICC line infiltration, sequela 07/22/2019     Priority: Medium     Infection by Candida species 01/04/2019     Priority: " Medium     Bacteremia 10/10/2018     Priority: Medium     Hyperlipidemia LDL goal <130 08/07/2018     Priority: Medium     S/P bariatric surgery 07/30/2018     Priority: Medium     Generalized weakness 01/30/2018     Priority: Medium     Catheter-related bloodstream infection (CRBSI) 09/23/2017     Priority: Medium     Low serum iron 09/08/2017     Priority: Medium     Anemia, iron deficiency 09/08/2017     Priority: Medium     Abnormal echocardiogram 04/11/2017     Priority: Medium     Port or reservoir infection, initial encounter 03/16/2017     Priority: Medium     Status post cervical spinal arthrodesis 02/15/2017     Priority: Medium     Short gut syndrome      Priority: Medium     Anxiety      Priority: Medium     Chronic nausea 05/10/2016     Priority: Medium     Fungemia 04/11/2016     Priority: Medium     Positive blood culture 03/29/2016     Priority: Medium     Insomnia 08/13/2015     Priority: Medium     Chronic pain 07/07/2015     Priority: Medium     Patient is followed by Dr. Milligan for ongoing prescription of pain medication.  All refills should be approved by this provider, or covering partner.    Medication(s): Fentanyl 50 mcg patches every three days/ Liquid oxycodone 5 mg/5ml up to 50 mg daily.   Maximum quantity per month: as per Dr. Milligan through [facility]  Clinic visit frequency required: Q 3 months at Pain Management    Controlled substance agreement on file: Yes       Date(s): Through Pain Management    Pain Clinic evaluation in the past: Yes       Date(s):  Ongoing every three months       Location(s):  Per pain management    DIRE Total Score(s):  No flowsheet data found.    Last [facility] website verification:  Through Pain Management   [email or web address]    Esteban Daly MD             Health Care Home 02/24/2015     Priority: Medium              Iron deficiency 05/23/2014     Priority: Medium     Vitamin D deficiency 05/22/2014     Priority: Medium     Former  smoker 02/24/2014     Priority: Medium     Bile reflux esophagitis 10/16/2013     Priority: Medium     Constipation 10/01/2013     Priority: Medium     Chronic anxiety 09/25/2013     Priority: Medium     Dysphagia 09/17/2013     Priority: Medium     Weight loss, non-intentional 09/17/2013     Priority: Medium     Malnutrition (H) 09/17/2013     Priority: Medium     Dehydration 08/28/2013     Priority: Medium     Vitamin B12 deficiency without anemia 07/31/2013     Priority: Medium     Diagnosis updated by automated process. Provider to review and confirm.       Thiamine deficiency 07/31/2013     Priority: Medium     ADHD (attention deficit hyperactivity disorder), inattentive type 07/02/2013     Priority: Medium     Patient is followed by [facility]ICCO SHARP for ongoing prescription of stimulants.  All refills should be approved by this provider, or covering partner.    Medication(s): Adderall.   Maximum quantity per month: 30  Clinic visit frequency required:      Controlled substance agreement on file: No  Neuropsych evaluation for ADD completed:  No    Last [facility] website verification:  done on 5/6/19  [email or web address]         Anemia 09/20/2012     Priority: Medium     Vomiting 06/28/2012     Priority: Medium     Chronic abdominal pain 06/01/2012     Priority: Medium     Patient is followed by ALEXANDRIA WHITLEY for ongoing prescription of narcotic pain medicine.  Med: liquid oxycodone up to 60 mg per day.   Maximum use per month:   Expected duration: ongoing, hope per surgery that will resolve with upcoming surgery  Narcotic agreement on file: YES  Clinic visit recommended: Q 3 months         Peptic ulcer disease 04/08/2010     Priority: Medium     Gastric bypass status for obesity 04/08/2010     Priority: Medium     Top weight of 492.          Past Medical History:    Past Medical History:   Diagnosis Date     ADHD (attention deficit hyperactivity disorder)      Anxiety      Cardiomyopathy in  nutritional diseases (H)      Chronic abdominal pain      CLABSI (central line-associated bloodstream infection)      Complication of anesthesia      Difficulty swallowing      Gastric ulcer, unspecified as acute or chronic, without mention of hemorrhage, perforation, or obstruction      Gastro-oesophageal reflux disease      Head injury      Hiatal hernia      Other bladder disorder      Other chronic pain      PONV (postoperative nausea and vomiting)      Severe malnutrition (H)      Short gut syndrome      Tobacco abuse        Past Surgical History:    Past Surgical History:   Procedure Laterality Date     AMPUTATION       APPENDECTOMY       BACK SURGERY  11/3/2014    curve in the spine     BIOPSY LYMPH NODE CERVICAL N/A 2/20/2015    Procedure: BIOPSY LYMPH NODE CERVICAL;  Surgeon: Baron Scanlon MD;  [facility]: [facility]     CHOLECYSTECTOMY       COLONOSCOPY N/A 7/14/2021    Procedure: COLONOSCOPY;  Surgeon: Jimbo Estrada MD;  Location: [facility] OR     COLONOSCOPY N/A 4/13/2022    Procedure: COLONOSCOPY;  Surgeon: Jimbo Estrada MD;  Location: [facility] OR     DISCECTOMY, FUSION CERVICAL ANTERIOR ONE LEVEL, COMBINED N/A 2/15/2017    Procedure: COMBINED DISCECTOMY, FUSION CERVICAL ANTERIOR ONE LEVEL;  Surgeon: Darren Campos MD;  [facility]     ENDOSCOPIC INSERTION TUBE GASTROSTOMY  9/9/2013    Procedure: ENDOSCOPIC INSERTION TUBE GASTROSTOMY;;  Surgeon: Francis Vyas MD;  [facility]     ENDOSCOPIC ULTRASOUND UPPER GASTROINTESTINAL TRACT (GI)  4/29/2011    Procedure:ENDOSCOPIC ULTRASOUND UPPER GASTROINTESTINAL TRACT (GI); Both Procedures done Conjointly; Surgeon:NEREIDA HOUSER; [facility]     ENDOSCOPIC ULTRASOUND UPPER GASTROINTESTINAL TRACT (GI)  9/9/2013    Procedure: ENDOSCOPIC ULTRASOUND UPPER GASTROINTESTINAL TRACT (GI);  Endoscopic Ultrasound Guide Gastrostomy Tube Placement  C-arm;  Surgeon: Noe Lizarraga MD;  [facility]     ENDOSCOPIC ULTRASOUND UPPER  GASTROINTESTINAL TRACT (GI) N/A 2/24/2021    Procedure: ENDOSCOPIC ULTRASOUND, ESOPHAGOSCOPY / UPPER GASTROINTESTINAL TRACT (GI), esophagastrogastroduodenoscopy;  Surgeon: Berny Bach MD;  Location:  OR     ENDOSCOPY  03/25/11    EGD, [facility]     ENDOSCOPY  08/04/09    Upper Endoscopy, [facility]     ENDOSCOPY  01/05/09    Upper Endoscopy, [facility]     ESOPHAGOSCOPY, GASTROSCOPY, DUODENOSCOPY (EGD), COMBINED  4/20/2011    Procedure:COMBINED ESOPHAGOSCOPY, GASTROSCOPY, DUODENOSCOPY (EGD); Surgeon:BLU VYAS; Location: GI     ESOPHAGOSCOPY, GASTROSCOPY, DUODENOSCOPY (EGD), COMBINED  6/15/2011    Procedure:COMBINED ESOPHAGOSCOPY, GASTROSCOPY, DUODENOSCOPY (EGD); Surgeon:BLU VYAS; Location: GI     ESOPHAGOSCOPY, GASTROSCOPY, DUODENOSCOPY (EGD), COMBINED  6/12/2013    Procedure: COMBINED ESOPHAGOSCOPY, GASTROSCOPY, DUODENOSCOPY (EGD);;  Surgeon: Blu Vyas MD;  Location:  GI     ESOPHAGOSCOPY, GASTROSCOPY, DUODENOSCOPY (EGD), COMBINED  11/22/2013    Procedure: COMBINED ESOPHAGOSCOPY, GASTROSCOPY, DUODENOSCOPY (EGD);;  Surgeon: Blu Vyas MD;  Location:  OR     ESOPHAGOSCOPY, GASTROSCOPY, DUODENOSCOPY (EGD), COMBINED  4/30/2014    Procedure: COMBINED ESOPHAGOSCOPY, GASTROSCOPY, DUODENOSCOPY (EGD);  Surgeon: Blu Vyas MD;  Location:  GI     ESOPHAGOSCOPY, GASTROSCOPY, DUODENOSCOPY (EGD), COMBINED N/A 2/20/2015    Procedure: COMBINED ESOPHAGOSCOPY, GASTROSCOPY, DUODENOSCOPY (EGD), BIOPSY SINGLE OR MULTIPLE;  Surgeon: Baron Scanlon MD;  Location:  OR     ESOPHAGOSCOPY, GASTROSCOPY, DUODENOSCOPY (EGD), COMBINED N/A 9/30/2015    Procedure: COMBINED ESOPHAGOSCOPY, GASTROSCOPY, DUODENOSCOPY (EGD);  Surgeon: Blu Vyas MD;  Location:  GI     ESOPHAGOSCOPY, GASTROSCOPY, DUODENOSCOPY (EGD), COMBINED N/A 10/3/2019    Procedure: Upper Endoscopy;  Surgeon: Clif Morrow MD;  Location:  OR      GASTRECTOMY  6/22/2012    Procedure: GASTRECTOMY;  Open Approach, Excise Ulcers,Partial Gastrectomy, Esophagojejunostomy, Hiatal Hernia Repair, Extensive Lysis of Adhesions and Esaphagogastrodudenoscopy.;  Surgeon: Francis Vyas MD;  [facility]     GASTROJEJUNOSTOMY  08/26/09    Extensice enterolysis, partial resect. jejunum, part. resect gastric pouch, gastrojejunostomy anastomosis     HC ESOPH/GAS REFLUX TEST W NASAL IMPED ELECTRODE  8/5/2013    Procedure: ESOPHAGEAL IMPEDENCE FUNCTION TEST 1 HOUR OR LESS;  Surgeon: Halie Lang MD;  [facility]     HEAD & NECK SURGERY  2/15/2017    C5-C6     HERNIA REPAIR  2006    Umbilical hernia     HERNIORRHAPHY HIATAL  6/22/2012    Procedure: HERNIORRHAPHY HIATAL;;  Surgeon: Francis Vyas MD;  [facility]     HERNIORRHAPHY INGUINAL  11/22/2013    Procedure: HERNIORRHAPHY INGUINAL;;  Surgeon: Francis Vyas MD;  [facility]     INSERT PICC LINE Right 12/19/2019    Procedure: Picc Placement;  Surgeon: Per Dumont PA-C;  [facility]     INSERT PICC LINE Right 2/21/2020    Procedure: INSERTION, PICC;  Surgeon: Per Dumont PA-C;  [facility]     INSERT PORT VASCULAR ACCESS Right 12/19/2017    Procedure: INSERT PORT VASCULAR ACCESS;  Right Chest Port Placement ;  Surgeon: Lisandro Alejandro PA-C;  [facility]     INSERT PORT VASCULAR ACCESS Right 8/2/2018    Procedure: INSERT PORT VASCULAR ACCESS;  Place single lumen tunneled central venous access catheter;  Surgeon: Guy Jamil PA-C;  [facility]     IR CVC TUNNEL PLACEMENT > 5 YRS OF AGE  8/7/2019     IR CVC TUNNEL PLACEMENT > 5 YRS OF AGE  4/14/2020     IR CVC TUNNEL PLACEMENT > 5 YRS OF AGE  8/3/2020     IR CVC TUNNEL PLACEMENT > 5 YRS OF AGE  9/4/2020     IR CVC TUNNEL PLACEMENT > 5 YRS OF AGE  2/5/2021     IR CVC TUNNEL PLACEMENT > 5 YRS OF AGE  3/23/2021     IR CVC TUNNEL PLACEMENT > 5 YRS OF AGE  1/13/2022     IR CVC TUNNEL  PLACEMENT > 5 YRS OF AGE  5/12/2022     IR CVC TUNNEL REMOVAL RIGHT  10/1/2019     IR CVC TUNNEL REMOVAL RIGHT  7/30/2020     IR CVC TUNNEL REMOVAL RIGHT  9/2/2020     IR CVC TUNNEL REMOVAL RIGHT  2/3/2021     IR CVC TUNNEL REMOVAL RIGHT  3/19/2021     IR CVC TUNNEL REMOVAL RIGHT  1/10/2022     IR CVC TUNNEL REMOVAL RIGHT  5/9/2022     IR CVC TUNNEL REVISION RIGHT  5/7/2021     IR FOLLOW UP VISIT OUTPATIENT  8/7/2019     IR PICC PLACEMENT > 5 YRS OF AGE  3/7/2019     IR PICC PLACEMENT > 5 YRS OF AGE  12/19/2019     IR PICC PLACEMENT > 5 YRS OF AGE  2/21/2020     LAPAROTOMY EXPLORATORY  11/22/2013    Procedure: LAPAROTOMY EXPLORATORY;  Exploratory Laparotomy, Upper Endoscopy, Left Inguinal Hernia Repair;  Surgeon: Francis Vyas MD;  [facility]     ORTHOPEDIC SURGERY       PICC INSERTION Right 03/16/2017    5fr DL BioFlo PICC, 42cm (3cm external) in the R medial brachial vein w/ tip in the SVC RA junction.     PICC INSERTION Left 09/23/2017    5fr DL BioFlo PICC, 45cm (1cm external) in the L basilic vein w/ tip in the SVC RA junction.     PICC INSERTION Right 05/16/2019    5Fr - 43cm, Medial brachial vein, low SVC     PICC INSERTION Right 10/02/2019    5Fr - 43cm (2cm external), basilic vein, low SVC     SHAYLEE EN Y BOWEL  2003     SOFT TISSUE SURGERY       THORACIC SURGERY       TONSILLECTOMY       TRANSESOPHAGEAL ECHOCARDIOGRAM INTRAOPERATIVE N/A 1/8/2019    Procedure: TRANSESOPHAGEAL ECHOCARDIOGRAM INTRAOPERATIVE;  Surgeon: GENERIC ANESTHESIA PROVIDER;  [facility]     [facility],OBESE<100CM SHAYLEE-EN-Y  2002    lost 300 pounds       Family History:    Family History   Problem Relation Age of Onset     Gastrointestinal Disease Mother         Crohns disease     Anxiety Disorder Mother      Thyroid Disease Mother         Grave's disease     Cancer Father         ear cancer-skin cancer/melanoma     Breast Cancer Maternal Grandmother      Macular Degeneration Maternal Grandfather      Anxiety  "Disorder Sister      Diabetes Maternal Uncle      Breast Cancer Other      Hypertension No family hx of      Hyperlipidemia No family hx of      Cerebrovascular Disease No family hx of      Prostate Cancer No family hx of      Depression No family hx of      Anesthesia Reaction No family hx of      Asthma No family hx of      Osteoporosis No family hx of      Genetic Disorder No family hx of      Obesity No family hx of      Mental Illness No family hx of      Substance Abuse No family hx of      Glaucoma No family hx of        Social History:  Marital Status:   [2]  Social History     Tobacco Use     Smoking status: Light Tobacco Smoker     Packs/day: 0.10     Years: 3.00     Pack years: 0.30     Types: Cigarettes     Smokeless tobacco: Never Used     Tobacco comment: 2/4/2021    smokes 3 cigarettes/day   Vaping Use     Vaping Use: Never used   Substance Use Topics     Alcohol use: No     Comment: quit 2002     Drug use: No        Medications:    acetaminophen (TYLENOL) 32 mg/mL liquid  albuterol (PROAIR HFA/PROVENTIL HFA/VENTOLIN HFA) 108 (90 Base) MCG/ACT inhaler  amphetamine-dextroamphetamine (ADDERALL) 20 MG tablet  bisacodyl (DULCOLAX) 5 MG EC tablet  carvedilol (COREG) 6.25 MG tablet  cyanocobalamin (CYANOCOBALAMIN) 1000 MCG/ML injection  enoxaparin ANTICOAGULANT (LOVENOX) 120 MG/0.8ML syringe  EPINEPHrine (ANY BX GENERIC EQUIV) 0.3 MG/0.3ML injection 2-pack  [facility] INFUSION MANAGED PATIENT  fentaNYL (DURAGESIC) 25 mcg/hr 72 hr patch  insulin syringe-needle U-100 (29G X 1/2\" 1 ML) 29G X 1/2\" 1 ML miscellaneous  lactated ringers infusion  lidocaine (LIDODERM) 5 % patch  LORazepam (ATIVAN) 1 MG tablet  magnesium citrate solution  naloxone (NARCAN) 4 MG/0.1ML nasal spray  nystatin (MYCOSTATIN) 151473 UNIT/GM external cream  ondansetron (ZOFRAN-ODT) 8 MG ODT tab  oxyCODONE (ROXICODONE) 5 MG/5ML solution  pantoprazole (PROTONIX) 40 MG EC tablet  parenteral nutrition - PTA/DISCHARGE " ORDER  polyethylene glycol (GOLYTELY) 236 g suspension  polyethylene glycol (MIRALAX) 17 GM/Dose powder  sucralfate (CARAFATE) 1 GM/10ML suspension  vitamin D2 (ERGOCALCIFEROL) 81180 units (1250 mcg) capsule          Review of Systems   All other systems reviewed and are negative.      Physical Exam   BP: 129/88  Pulse: 91  Temp: 97.8  F (36.6  C)  Resp: 18  Weight: 79.4 kg (175 lb)  SpO2: 99 %      Physical Exam  Vitals and nursing note reviewed.   Constitutional:       General: He is not in acute distress.     Appearance: He is not ill-appearing, toxic-appearing or diaphoretic.      Comments: Chronically ill-appearing male in no apparent distress.   HENT:      Head: Normocephalic and atraumatic.      Nose: Nose normal.   Eyes:      Extraocular Movements: Extraocular movements intact.      Conjunctiva/sclera: Conjunctivae normal.   Cardiovascular:      Pulses: Normal pulses.   Pulmonary:      Effort: Pulmonary effort is normal. No respiratory distress.   Musculoskeletal:      Cervical back: Neck supple.      Comments: Left ankle: Patient has mild swelling throughout the ankle with tenderness to light palpation throughout the ankle.  No palpable bony deformity.  Achilles tendon feels intact.  Left foot: Trace swelling to proximal aspect of foot.  Patient exhibits tenderness to light palpation anywhere in the foot.  No palpable bony deformities.  DP pulse is palpable.   Skin:     General: Skin is warm and dry.   Neurological:      General: No focal deficit present.      Mental Status: He is alert and oriented to person, place, and time. Mental status is at baseline.   Psychiatric:         Mood and Affect: Mood normal.         Behavior: Behavior normal.         ED Course        Procedures      Results for orders placed or performed during the hospital encounter of 07/02/22 (from the past 24 hour(s))   XR Foot Left G/E 3 Views    Narrative    EXAM: XR FOOT LEFT G/E 3 VIEWS, XR ANKLE LEFT G/E 3 VIEWS  LOCATION: [facility]  DATE/TIME: 7/2/2022 6:43 PM    INDICATION: Injury, pain  COMPARISON: None.      Impression    IMPRESSION:   Foot: No acute fracture or dislocation. Minimal degenerative arthritis of the first MTP joint.    Ankle: Normal joint spaces and alignment. Chronic ununited or heterotopic bone fragment adjacent to the lateral malleolus. No acute appearing fracture.   XR Ankle Left G/E 3 Views    Narrative    EXAM: XR FOOT LEFT G/E 3 VIEWS, XR ANKLE LEFT G/E 3 VIEWS  LOCATION: [facility]  DATE/TIME: 7/2/2022 6:43 PM    INDICATION: Injury, pain  COMPARISON: None.      Impression    IMPRESSION:   Foot: No acute fracture or dislocation. Minimal degenerative arthritis of the first MTP joint.    Ankle: Normal joint spaces and alignment. Chronic ununited or heterotopic bone fragment adjacent to the lateral malleolus. No acute appearing fracture.     *Note: Due to a large number of results and/or encounters for the requested time period, some results have not been displayed. A complete set of results can be found in Results Review.       Medications   HYDROcodone-acetaminophen (NORCO) 5-325 MG per tablet 2 tablet (2 tablets Oral Given 7/2/22 1855)          Assessments & Plan (with Medical Decision Making)  Parker Acevedo is a 51 year old male who presented to the ED for concerns of left ankle and foot pain after slipping and rolling the ankle earlier today.  Denies any other injuries.  On exam today he had diffuse mild swelling to the ankle and proximal foot with tenderness to light palpation throughout.  No palpable deformities however and he was neurovascularly intact.  Patient was given Norco here for pain control.  X-rays of the foot and ankle were obtained which were both fortunately negative for any acute injury.  I discussed with patient that clinically he may have a sprain of the ankle.  He was agreeable to treating this with a walking boot for comfort and  support.  He was encouraged to continue with his crutches to be weightbearing as tolerated.  Elevate and ice when not ambulating.  He can take Tylenol or his home pain medications for pain relief.  If no improvement in 1 to 2 weeks advised to follow-up with his clinic provider.  Return precautions were also provided.  All questions answered and patient discharged home in suitable condition.     I have reviewed the nursing notes.    I have reviewed the findings, diagnosis, plan and need for follow up with the patient.    New Prescriptions    No medications on file       Final diagnoses:   Left ankle sprain     Note: Chart documentation done in part with Dragon Voice Recognition software. Although reviewed after completion, some word and grammatical errors may remain.        7/2/2022   [facility] EMERGENCY DEPT     Pinky Anton PA-C  07/02/22 [id number]

## 2022-07-02 NOTE — ED TRIAGE NOTES
Pt slipped on water in the kitchen this am.  Left foot and ankle pain with associated swelling.      Triage Assessment     Row Name 07/02/22 [id number]       Triage Assessment (Adult)    Airway WDL WDL       Respiratory WDL    Respiratory WDL WDL       Skin Circulation/Temperature WDL    Skin Circulation/Temperature WDL WDL       Cardiac WDL    Cardiac WDL WDL       Peripheral/Neurovascular WDL    Peripheral Neurovascular WDL X       Cognitive/Neuro/Behavioral WDL    Cognitive/Neuro/Behavioral WDL WDL

## 2022-07-03 NOTE — DISCHARGE INSTRUCTIONS
Please wear the walking boot to help support the sprain and allow it to heal.  Use the crutches to be weightbearing as tolerated.  Try to elevate the ankle and ice when not ambulating.  Use Tylenol or your home pain medications as needed for pain.  Follow-up with your clinic provider in 1 to 2 weeks if no improvement.  Return to the emergency department for any worsening concerns.    Thank you for choosing [facility]'s Emergency Department. It was a pleasure taking care of you today. If you have any questions, please call [phone].    Pinky Anton PA-C

## 2022-07-04 ENCOUNTER — NURSE TRIAGE (OUTPATIENT)
Dept: NURSING | Facility: CLINIC | Age: 52
End: 2022-07-04

## 2022-07-04 ENCOUNTER — HOME INFUSION (PRE-WILLOW HOME INFUSION) (OUTPATIENT)
Dept: PHARMACY | Facility: CLINIC | Age: 52
End: 2022-07-04

## 2022-07-04 NOTE — TELEPHONE ENCOUNTER
Henny HARRISON [facility] [phone].  Parker Acevedo 11/6/2017.  110.7 fever and chills.  Has a double lumen azevedo for TPN.  Last azevedo was changed march 2022.  Wife wondering about getting blood culture orders.    Writer talking with ernestine Rose.  Today developed a fever 100.7 and chills. Has had Bacteremia associated with intravascular line.  Has a double lumen azevedo catheter in place for TPN.  In past had positive blood culture.      Uses [facility] for infusions.    Calling to request blood culture orders to have home care draw on Tuesday.    Writer paged on call Dr. Doherty at 6:40 pm.  He would like the patient to come into [place] ED to get blood cultures.       Writer called on spoke to Rose and gave her Dr. Doherty's advise.  She is not sure if Tray will come into the ED.  They will talk it over.  Writer encouraged patient to come into ED.    Henny Crawford RN, [facility] Triage Nurse Advisor    Reason for Disposition    [1] Fever > 100.0 F (37.8 C) AND [2] has port (portacath), central line, or PICC line    Additional Information    Negative: Shock suspected (e.g., cold/pale/clammy skin, too weak to stand, low BP, rapid pulse)    Negative: Difficult to awaken or acting confused (e.g., disoriented, slurred speech)    Negative: [1] Difficulty breathing AND [2] bluish lips, tongue or face    Negative: New onset rash with multiple purple (or blood-colored) spots or dots    Negative: Sounds like a life-threatening emergency to the triager    Negative: [1] Headache AND [2] stiff neck (can't touch chin to chest)    Negative: Difficulty breathing    Negative: IV drug abuse    Negative: [1] Drinking very little AND [2] dehydration suspected (e.g., no urine > 12 hours, very dry mouth, very lightheaded)    Negative: Patient sounds very sick or weak to the triager  (Exception: mild weakness and hasn't taken fever medicine)    Negative: [1] Fever > 100.0 F (37.8 C) AND [2] indwelling urinary catheter  (e.g., Laura, Mohinder)    Negative: [1] Fever > 100.0 F (37.8 C) AND [2] bedridden (e.g., nursing home patient, CVA, chronic illness, recovering from surgery)    Negative: [1] Fever > 101 F (38.3 C) AND [2] age > 60    Negative: Fever > 104 F (40 C)    Protocols used: FEVER-A-AH

## 2022-07-05 ENCOUNTER — HOME INFUSION (PRE-WILLOW HOME INFUSION) (OUTPATIENT)
Dept: PHARMACY | Facility: CLINIC | Age: 52
End: 2022-07-05

## 2022-07-05 ENCOUNTER — LAB REQUISITION (OUTPATIENT)
Dept: LAB | Facility: CLINIC | Age: 52
DRG: 314 | End: 2022-07-05
Payer: COMMERCIAL

## 2022-07-05 ENCOUNTER — LAB REQUISITION (OUTPATIENT)
Dept: LAB | Facility: CLINIC | Age: 52
End: 2022-07-05
Payer: COMMERCIAL

## 2022-07-05 DIAGNOSIS — R13.10 DYSPHAGIA, UNSPECIFIED: ICD-10-CM

## 2022-07-05 DIAGNOSIS — R50.9 CHILLS WITH FEVER: Primary | ICD-10-CM

## 2022-07-05 LAB
ALBUMIN SERPL BCG-MCNC: 3.5 G/DL (ref 3.5–5.2)
ALP SERPL-CCNC: 53 U/L (ref 40–129)
ALT SERPL W P-5'-P-CCNC: 27 U/L (ref 10–50)
ANION GAP SERPL CALCULATED.3IONS-SCNC: 9 MMOL/L (ref 7–15)
AST SERPL W P-5'-P-CCNC: 26 U/L (ref 10–50)
BASOPHILS # BLD AUTO: 0 10E3/UL (ref 0–0.2)
BASOPHILS NFR BLD AUTO: 0 %
BILIRUB DIRECT SERPL-MCNC: <0.2 MG/DL (ref 0–0.3)
BILIRUB SERPL-MCNC: 0.5 MG/DL
BILIRUB SERPL-MCNC: 0.5 MG/DL
BUN SERPL-MCNC: 9.1 MG/DL (ref 6–20)
CALCIUM SERPL-MCNC: 8.5 MG/DL (ref 8.6–10)
CHLORIDE SERPL-SCNC: 103 MMOL/L (ref 98–107)
CREAT SERPL-MCNC: 0.73 MG/DL (ref 0.67–1.17)
DEPRECATED HCO3 PLAS-SCNC: 26 MMOL/L (ref 22–29)
EOSINOPHIL # BLD AUTO: 0 10E3/UL (ref 0–0.7)
EOSINOPHIL NFR BLD AUTO: 1 %
ERYTHROCYTE [DISTWIDTH] IN BLOOD BY AUTOMATED COUNT: 14.8 % (ref 10–15)
FASTING STATUS PATIENT QL REPORTED: NORMAL
GFR SERPL CREATININE-BSD FRML MDRD: >90 ML/MIN/1.73M2
GLUCOSE SERPL-MCNC: 94 MG/DL (ref 70–99)
HCT VFR BLD AUTO: 31.7 % (ref 40–53)
HGB BLD-MCNC: 10.1 G/DL (ref 13.3–17.7)
HOLD SPECIMEN: NORMAL
IMM GRANULOCYTES # BLD: 0 10E3/UL
IMM GRANULOCYTES NFR BLD: 0 %
LYMPHOCYTES # BLD AUTO: 0.5 10E3/UL (ref 0.8–5.3)
LYMPHOCYTES NFR BLD AUTO: 8 %
MAGNESIUM SERPL-MCNC: 1.8 MG/DL (ref 1.7–2.3)
MCH RBC QN AUTO: 31.5 PG (ref 26.5–33)
MCHC RBC AUTO-ENTMCNC: 31.9 G/DL (ref 31.5–36.5)
MCV RBC AUTO: 99 FL (ref 78–100)
MONOCYTES # BLD AUTO: 0.5 10E3/UL (ref 0–1.3)
MONOCYTES NFR BLD AUTO: 8 %
NEUTROPHILS # BLD AUTO: 5.5 10E3/UL (ref 1.6–8.3)
NEUTROPHILS NFR BLD AUTO: 83 %
NRBC # BLD AUTO: 0 10E3/UL
NRBC BLD AUTO-RTO: 0 /100
PHOSPHATE SERPL-MCNC: 3 MG/DL (ref 2.5–4.5)
PLATELET # BLD AUTO: 105 10E3/UL (ref 150–450)
POTASSIUM SERPL-SCNC: 3.3 MMOL/L (ref 3.4–5.3)
PROT SERPL-MCNC: 5.8 G/DL (ref 6.4–8.3)
RBC # BLD AUTO: 3.21 10E6/UL (ref 4.4–5.9)
SODIUM SERPL-SCNC: 138 MMOL/L (ref 136–145)
TRIGL SERPL-MCNC: 63 MG/DL
WBC # BLD AUTO: 6.6 10E3/UL (ref 4–11)

## 2022-07-05 PROCEDURE — 87077 CULTURE AEROBIC IDENTIFY: CPT | Performed by: INTERNAL MEDICINE

## 2022-07-05 PROCEDURE — 87149 DNA/RNA DIRECT PROBE: CPT | Performed by: INTERNAL MEDICINE

## 2022-07-05 NOTE — PROGRESS NOTES
This is a recent snapshot of the patient's [facility] medical record.  For current drug dose and complete information and questions, call [phone] or In Basket pool, [facility] home infusion ([id number])  CSN Number:  [id number]

## 2022-07-06 ENCOUNTER — PATIENT OUTREACH (OUTPATIENT)
Dept: CARE COORDINATION | Facility: CLINIC | Age: 52
End: 2022-07-06

## 2022-07-06 ENCOUNTER — TELEPHONE (OUTPATIENT)
Dept: GASTROENTEROLOGY | Facility: CLINIC | Age: 52
End: 2022-07-06

## 2022-07-06 ENCOUNTER — HOME INFUSION (PRE-WILLOW HOME INFUSION) (OUTPATIENT)
Dept: PHARMACY | Facility: CLINIC | Age: 52
End: 2022-07-06

## 2022-07-06 DIAGNOSIS — M79.18 MYOFASCIAL PAIN: ICD-10-CM

## 2022-07-06 DIAGNOSIS — M54.2 CERVICALGIA: ICD-10-CM

## 2022-07-06 DIAGNOSIS — R78.81 GRAM-POSITIVE BACTEREMIA: ICD-10-CM

## 2022-07-06 LAB
ACINETOBACTER SPECIES: NOT DETECTED
CITROBACTER SPECIES: NOT DETECTED
CTX-M: NOT DETECTED
ENTEROBACTER SPECIES: DETECTED
ENTEROCOCCUS FAECALIS: NOT DETECTED
ENTEROCOCCUS FAECIUM: NOT DETECTED
ESCHERICHIA COLI: NOT DETECTED
IMP: NOT DETECTED
KLEBSIELLA OXYTOCA: NOT DETECTED
KLEBSIELLA PNEUMONIAE: NOT DETECTED
KPC: NOT DETECTED
LISTERIA SPECIES (DETECTED/NOT DETECTED): NOT DETECTED
NDM: NOT DETECTED
OXA (DETECTED/NOT DETECTED): NOT DETECTED
PROTEUS SPECIES: NOT DETECTED
PSEUDOMONAS AERUGINOSA: NOT DETECTED
STAPHYLOCOCCUS AUREUS: NOT DETECTED
STAPHYLOCOCCUS EPIDERMIDIS: NOT DETECTED
STAPHYLOCOCCUS LUGDUNENSIS: NOT DETECTED
STAPHYLOCOCCUS SPECIES: NOT DETECTED
STREPTOCOCCUS AGALACTIAE: NOT DETECTED
STREPTOCOCCUS ANGINOSUS GROUP: NOT DETECTED
STREPTOCOCCUS PNEUMONIAE: NOT DETECTED
STREPTOCOCCUS PYOGENES: NOT DETECTED
STREPTOCOCCUS SPECIES: DETECTED
VIM: NOT DETECTED

## 2022-07-06 RX ORDER — BISACODYL 5 MG/1
TABLET, DELAYED RELEASE ORAL
Qty: 2 TABLET | Refills: 0 | Status: ON HOLD | OUTPATIENT
Start: 2022-07-06 | End: 2022-07-13

## 2022-07-06 ASSESSMENT — ACTIVITIES OF DAILY LIVING (ADL): DEPENDENT_IADLS:: TRANSPORTATION

## 2022-07-06 NOTE — TELEPHONE ENCOUNTER
Attempted to contact patient regarding upcoming colonoscopy  procedure on 7/12/22 for pre assessment questions. No answer.     Left message to return call to [phone] #3    Covid test scheduled 7/8/22 Discuss at home rapid antigen COVID test 1-2 days prior to procedure.    Arrival time: 1020    Facility location: [facility]     Sedation type: MAC     Indication for procedure: repeat d/t poor prep, gram positive bacteremia     Anticoagulants: Enoxaparin (Lovenox) . Holding interval of 24 days    Bowel prep recommendation: Extended prep d/t poor prep and order.    Extended [place] [facility] [place] [place] - [address]. Prep instructions sent via [employer].    Jyothi Holloway RN

## 2022-07-06 NOTE — PROGRESS NOTES
Clinic Care Coordination Contact    Follow Up Progress Note      Assessment: CC RN called out to Rose lopez (consent to communicate on file) due to his 7/2 ER visit. They went to ER to see if left ankle was broken due to a slip on water. Patient and wife relieved to know imaging was negative for fracture and is diagnosed as a sprain. Swelling has come down and is less painful as days pass. This concern has taken less of a priority as there is new concern for infection of port. Home care nurse came out yesterday and tata blood. Awaiting for finalized result. Wife has been checking [facility] [employer] [facility] and has phone number for [facility] phone [phone]. She is going to either send a [facility] [employer] [facility] message to ordering provider, or will call ID care team. Today patient is sleepy and tired but alert. Have not taken his temperature. Patient is not feeling any worse today. Comfortable placing a call/Presentt message to ID for plan.        Care Gaps: AWE is patients health related goal. This appointment is not pending. Wife aware and will schedule via [facility] [employer] [facility].     Health Maintenance Due   Topic Date Due     MEDICARE ANNUAL WELLNESS VISIT  06/21/2017     COVID-19 Vaccine (3 - Booster for Moderna series) 01/09/2022     Goals addressed this encounter:    Goals Addressed                    This Visit's Progress       General       Medical (pt-stated)   0%      Goal Statement: I will complete my annual wellness visit.  Date Goal set: 5/20/22  Barriers: complex care, and high volume of appointments at baseline  Strengths: wife is pts main caregiver, and organizes his appointments.   Date to Achieve By: 9/2022  Patient expressed understanding of goal: yes  Action steps to achieve this goal:  1. I will schedule my annual wellness visit; [id number] ([phone])  2. I will attend my annual wellness visit.  3. I will contact my Care Management or clinic team if I have barriers to attending my annual wellness visit.               Intervention/Education provided during outreach: Discussed patients plan of care. CC RN asked open ended questions, provided support, resources, and encouragement as needed. Patient will reach out to care team sooner than planned with new questions or concerns.       Plan:   Care Coordinator will follow up in 1 month.  Jami Blank RN, BSN, PHN Care Coordinator  [place], [place], and [place]   Phone: [phone]

## 2022-07-07 ENCOUNTER — APPOINTMENT (OUTPATIENT)
Dept: CT IMAGING | Facility: CLINIC | Age: 52
DRG: 314 | End: 2022-07-07
Payer: COMMERCIAL

## 2022-07-07 ENCOUNTER — APPOINTMENT (OUTPATIENT)
Dept: GENERAL RADIOLOGY | Facility: CLINIC | Age: 52
DRG: 314 | End: 2022-07-07
Attending: EMERGENCY MEDICINE
Payer: COMMERCIAL

## 2022-07-07 ENCOUNTER — HOSPITAL ENCOUNTER (INPATIENT)
Facility: CLINIC | Age: 52
LOS: 6 days | Discharge: HOME HEALTH | DRG: 314 | End: 2022-07-13
Attending: EMERGENCY MEDICINE | Admitting: INTERNAL MEDICINE
Payer: COMMERCIAL

## 2022-07-07 ENCOUNTER — TELEPHONE (OUTPATIENT)
Dept: INFECTIOUS DISEASES | Facility: CLINIC | Age: 52
End: 2022-07-07

## 2022-07-07 ENCOUNTER — HOME INFUSION (PRE-WILLOW HOME INFUSION) (OUTPATIENT)
Dept: PHARMACY | Facility: CLINIC | Age: 52
End: 2022-07-07

## 2022-07-07 DIAGNOSIS — R78.81 BACTEREMIA: ICD-10-CM

## 2022-07-07 DIAGNOSIS — Z78.9 ON TOTAL PARENTERAL NUTRITION: ICD-10-CM

## 2022-07-07 DIAGNOSIS — I10 BENIGN ESSENTIAL HYPERTENSION: ICD-10-CM

## 2022-07-07 DIAGNOSIS — R78.81 GRAM-NEGATIVE BACTEREMIA: ICD-10-CM

## 2022-07-07 DIAGNOSIS — I82.A11 ACUTE DEEP VEIN THROMBOSIS (DVT) OF AXILLARY VEIN OF RIGHT UPPER EXTREMITY (H): ICD-10-CM

## 2022-07-07 DIAGNOSIS — T82.7XXA BACTEREMIA ASSOCIATED WITH INTRAVASCULAR LINE, INITIAL ENCOUNTER (H): Primary | ICD-10-CM

## 2022-07-07 DIAGNOSIS — I82.90 VTE (VENOUS THROMBOEMBOLISM): ICD-10-CM

## 2022-07-07 DIAGNOSIS — T80.211A BLOODSTREAM INFECTION ASSOCIATED WITH CENTRAL VENOUS CATHETER, INITIAL ENCOUNTER: ICD-10-CM

## 2022-07-07 DIAGNOSIS — Z20.822 LAB TEST NEGATIVE FOR COVID-19 VIRUS: ICD-10-CM

## 2022-07-07 DIAGNOSIS — R78.81 BACTEREMIA ASSOCIATED WITH INTRAVASCULAR LINE, INITIAL ENCOUNTER (H): Primary | ICD-10-CM

## 2022-07-07 DIAGNOSIS — G89.4 CHRONIC PAIN SYNDROME: ICD-10-CM

## 2022-07-07 DIAGNOSIS — R78.81 GRAM-POSITIVE BACTEREMIA: ICD-10-CM

## 2022-07-07 LAB
ALBUMIN SERPL BCG-MCNC: 3.5 G/DL (ref 3.5–5.2)
ALBUMIN UR-MCNC: 10 MG/DL
ALP SERPL-CCNC: 52 U/L (ref 40–129)
ALT SERPL W P-5'-P-CCNC: 34 U/L (ref 10–50)
ANION GAP SERPL CALCULATED.3IONS-SCNC: 7 MMOL/L (ref 7–15)
APPEARANCE UR: CLEAR
APTT PPP: 34 SECONDS (ref 22–38)
AST SERPL W P-5'-P-CCNC: 33 U/L (ref 10–50)
BASOPHILS # BLD AUTO: 0 10E3/UL (ref 0–0.2)
BASOPHILS NFR BLD AUTO: 0 %
BILIRUB SERPL-MCNC: 0.2 MG/DL
BILIRUB UR QL STRIP: NEGATIVE
BUN SERPL-MCNC: 11.9 MG/DL (ref 6–20)
CALCIUM SERPL-MCNC: 8.3 MG/DL (ref 8.6–10)
CHLORIDE SERPL-SCNC: 101 MMOL/L (ref 98–107)
COLOR UR AUTO: YELLOW
CREAT SERPL-MCNC: 0.77 MG/DL (ref 0.67–1.17)
CRP SERPL-MCNC: 47.7 MG/L
DEPRECATED HCO3 PLAS-SCNC: 26 MMOL/L (ref 22–29)
EOSINOPHIL # BLD AUTO: 0 10E3/UL (ref 0–0.7)
EOSINOPHIL # BLD AUTO: 0 10E3/UL (ref 0–0.7)
EOSINOPHIL # BLD AUTO: 0.2 10E3/UL (ref 0–0.7)
EOSINOPHIL NFR BLD AUTO: 0 %
EOSINOPHIL NFR BLD AUTO: 1 %
EOSINOPHIL NFR BLD AUTO: 3 %
ERYTHROCYTE [DISTWIDTH] IN BLOOD BY AUTOMATED COUNT: 14.6 % (ref 10–15)
FIBRINOGEN PPP-MCNC: 345 MG/DL (ref 170–490)
FLUAV RNA SPEC QL NAA+PROBE: NEGATIVE
FLUBV RNA RESP QL NAA+PROBE: NEGATIVE
GFR SERPL CREATININE-BSD FRML MDRD: >90 ML/MIN/1.73M2
GLUCOSE SERPL-MCNC: 83 MG/DL (ref 70–99)
GLUCOSE UR STRIP-MCNC: NEGATIVE MG/DL
HCT VFR BLD AUTO: 30.7 % (ref 40–53)
HCT VFR BLD AUTO: 31.2 % (ref 40–53)
HCT VFR BLD AUTO: 32 % (ref 40–53)
HGB BLD-MCNC: 10 G/DL (ref 13.3–17.7)
HGB BLD-MCNC: 10.5 G/DL (ref 13.3–17.7)
HGB BLD-MCNC: 9.9 G/DL (ref 13.3–17.7)
HGB UR QL STRIP: ABNORMAL
HOLD SPECIMEN: NORMAL
IMM GRANULOCYTES # BLD: 0 10E3/UL
IMM GRANULOCYTES # BLD: 0 10E3/UL
IMM GRANULOCYTES # BLD: 0.1 10E3/UL
IMM GRANULOCYTES NFR BLD: 0 %
IMM GRANULOCYTES NFR BLD: 0 %
IMM GRANULOCYTES NFR BLD: 1 %
INR PPP: 1.3 (ref 0.85–1.15)
KETONES UR STRIP-MCNC: 10 MG/DL
LACTATE SERPL-SCNC: 1.1 MMOL/L (ref 0.7–2)
LEUKOCYTE ESTERASE UR QL STRIP: NEGATIVE
LYMPHOCYTES # BLD AUTO: 0.1 10E3/UL (ref 0.8–5.3)
LYMPHOCYTES # BLD AUTO: 0.3 10E3/UL (ref 0.8–5.3)
LYMPHOCYTES # BLD AUTO: 1.2 10E3/UL (ref 0.8–5.3)
LYMPHOCYTES NFR BLD AUTO: 23 %
LYMPHOCYTES NFR BLD AUTO: 3 %
LYMPHOCYTES NFR BLD AUTO: 3 %
MAGNESIUM SERPL-MCNC: 1.8 MG/DL (ref 1.7–2.3)
MCH RBC QN AUTO: 31.2 PG (ref 26.5–33)
MCH RBC QN AUTO: 31.3 PG (ref 26.5–33)
MCH RBC QN AUTO: 31.6 PG (ref 26.5–33)
MCHC RBC AUTO-ENTMCNC: 32.1 G/DL (ref 31.5–36.5)
MCHC RBC AUTO-ENTMCNC: 32.2 G/DL (ref 31.5–36.5)
MCHC RBC AUTO-ENTMCNC: 32.8 G/DL (ref 31.5–36.5)
MCV RBC AUTO: 96 FL (ref 78–100)
MCV RBC AUTO: 97 FL (ref 78–100)
MCV RBC AUTO: 98 FL (ref 78–100)
MONOCYTES # BLD AUTO: 0.1 10E3/UL (ref 0–1.3)
MONOCYTES # BLD AUTO: 0.6 10E3/UL (ref 0–1.3)
MONOCYTES # BLD AUTO: 0.8 10E3/UL (ref 0–1.3)
MONOCYTES NFR BLD AUTO: 15 %
MONOCYTES NFR BLD AUTO: 3 %
MONOCYTES NFR BLD AUTO: 7 %
MUCOUS THREADS #/AREA URNS LPF: PRESENT /LPF
NEUTROPHILS # BLD AUTO: 3 10E3/UL (ref 1.6–8.3)
NEUTROPHILS # BLD AUTO: 3.1 10E3/UL (ref 1.6–8.3)
NEUTROPHILS # BLD AUTO: 7.4 10E3/UL (ref 1.6–8.3)
NEUTROPHILS NFR BLD AUTO: 59 %
NEUTROPHILS NFR BLD AUTO: 89 %
NEUTROPHILS NFR BLD AUTO: 93 %
NITRATE UR QL: NEGATIVE
NRBC # BLD AUTO: 0 10E3/UL
NRBC BLD AUTO-RTO: 0 /100
PH UR STRIP: 6 [PH] (ref 5–7)
PLATELET # BLD AUTO: 114 10E3/UL (ref 150–450)
PLATELET # BLD AUTO: 83 10E3/UL (ref 150–450)
PLATELET # BLD AUTO: 99 10E3/UL (ref 150–450)
POTASSIUM SERPL-SCNC: 3.4 MMOL/L (ref 3.4–5.3)
PROCALCITONIN SERPL IA-MCNC: 0.56 NG/ML
PROT SERPL-MCNC: 5.7 G/DL (ref 6.4–8.3)
RBC # BLD AUTO: 3.13 10E6/UL (ref 4.4–5.9)
RBC # BLD AUTO: 3.21 10E6/UL (ref 4.4–5.9)
RBC # BLD AUTO: 3.35 10E6/UL (ref 4.4–5.9)
RBC URINE: 4 /HPF
RETICS # AUTO: 0.03 10E6/UL (ref 0.03–0.1)
RETICS # AUTO: 0.04 10E6/UL (ref 0.03–0.1)
RETICS/RBC NFR AUTO: 1.1 % (ref 0.5–2)
RETICS/RBC NFR AUTO: 1.2 % (ref 0.5–2)
RSV RNA SPEC NAA+PROBE: NEGATIVE
SARS-COV-2 RNA RESP QL NAA+PROBE: NEGATIVE
SODIUM SERPL-SCNC: 134 MMOL/L (ref 136–145)
SP GR UR STRIP: 1.01 (ref 1–1.03)
TSH SERPL DL<=0.005 MIU/L-ACNC: 4.06 UIU/ML (ref 0.3–4.2)
UROBILINOGEN UR STRIP-MCNC: 4 MG/DL
WBC # BLD AUTO: 3.2 10E3/UL (ref 4–11)
WBC # BLD AUTO: 5.2 10E3/UL (ref 4–11)
WBC # BLD AUTO: 8.4 10E3/UL (ref 4–11)
WBC URINE: 2 /HPF

## 2022-07-07 PROCEDURE — 71046 X-RAY EXAM CHEST 2 VIEWS: CPT | Mod: 26 | Performed by: RADIOLOGY

## 2022-07-07 PROCEDURE — 83615 LACTATE (LD) (LDH) ENZYME: CPT | Performed by: STUDENT IN AN ORGANIZED HEALTH CARE EDUCATION/TRAINING PROGRAM

## 2022-07-07 PROCEDURE — 99285 EMERGENCY DEPT VISIT HI MDM: CPT | Mod: 25 | Performed by: EMERGENCY MEDICINE

## 2022-07-07 PROCEDURE — 250N000013 HC RX MED GY IP 250 OP 250 PS 637: Performed by: STUDENT IN AN ORGANIZED HEALTH CARE EDUCATION/TRAINING PROGRAM

## 2022-07-07 PROCEDURE — 71046 X-RAY EXAM CHEST 2 VIEWS: CPT

## 2022-07-07 PROCEDURE — 250N000013 HC RX MED GY IP 250 OP 250 PS 637: Performed by: EMERGENCY MEDICINE

## 2022-07-07 PROCEDURE — 85730 THROMBOPLASTIN TIME PARTIAL: CPT | Performed by: STUDENT IN AN ORGANIZED HEALTH CARE EDUCATION/TRAINING PROGRAM

## 2022-07-07 PROCEDURE — 96365 THER/PROPH/DIAG IV INF INIT: CPT | Performed by: EMERGENCY MEDICINE

## 2022-07-07 PROCEDURE — 81001 URINALYSIS AUTO W/SCOPE: CPT | Performed by: EMERGENCY MEDICINE

## 2022-07-07 PROCEDURE — 83735 ASSAY OF MAGNESIUM: CPT | Performed by: STUDENT IN AN ORGANIZED HEALTH CARE EDUCATION/TRAINING PROGRAM

## 2022-07-07 PROCEDURE — 85004 AUTOMATED DIFF WBC COUNT: CPT

## 2022-07-07 PROCEDURE — 85025 COMPLETE CBC W/AUTO DIFF WBC: CPT | Performed by: EMERGENCY MEDICINE

## 2022-07-07 PROCEDURE — 250N000011 HC RX IP 250 OP 636: Performed by: EMERGENCY MEDICINE

## 2022-07-07 PROCEDURE — 87077 CULTURE AEROBIC IDENTIFY: CPT | Performed by: EMERGENCY MEDICINE

## 2022-07-07 PROCEDURE — 36415 COLL VENOUS BLD VENIPUNCTURE: CPT | Performed by: STUDENT IN AN ORGANIZED HEALTH CARE EDUCATION/TRAINING PROGRAM

## 2022-07-07 PROCEDURE — 83605 ASSAY OF LACTIC ACID: CPT | Performed by: STUDENT IN AN ORGANIZED HEALTH CARE EDUCATION/TRAINING PROGRAM

## 2022-07-07 PROCEDURE — 36415 COLL VENOUS BLD VENIPUNCTURE: CPT | Performed by: EMERGENCY MEDICINE

## 2022-07-07 PROCEDURE — 99223 1ST HOSP IP/OBS HIGH 75: CPT | Mod: AI | Performed by: INTERNAL MEDICINE

## 2022-07-07 PROCEDURE — 80053 COMPREHEN METABOLIC PANEL: CPT | Performed by: EMERGENCY MEDICINE

## 2022-07-07 PROCEDURE — C9803 HOPD COVID-19 SPEC COLLECT: HCPCS | Performed by: EMERGENCY MEDICINE

## 2022-07-07 PROCEDURE — 83010 ASSAY OF HAPTOGLOBIN QUANT: CPT | Performed by: STUDENT IN AN ORGANIZED HEALTH CARE EDUCATION/TRAINING PROGRAM

## 2022-07-07 PROCEDURE — C9113 INJ PANTOPRAZOLE SODIUM, VIA: HCPCS

## 2022-07-07 PROCEDURE — 96376 TX/PRO/DX INJ SAME DRUG ADON: CPT | Performed by: EMERGENCY MEDICINE

## 2022-07-07 PROCEDURE — 85384 FIBRINOGEN ACTIVITY: CPT | Performed by: STUDENT IN AN ORGANIZED HEALTH CARE EDUCATION/TRAINING PROGRAM

## 2022-07-07 PROCEDURE — 96366 THER/PROPH/DIAG IV INF ADDON: CPT | Performed by: EMERGENCY MEDICINE

## 2022-07-07 PROCEDURE — 70470 CT HEAD/BRAIN W/O & W/DYE: CPT | Mod: 26 | Performed by: RADIOLOGY

## 2022-07-07 PROCEDURE — 85025 COMPLETE CBC W/AUTO DIFF WBC: CPT | Performed by: STUDENT IN AN ORGANIZED HEALTH CARE EDUCATION/TRAINING PROGRAM

## 2022-07-07 PROCEDURE — 84145 PROCALCITONIN (PCT): CPT | Performed by: EMERGENCY MEDICINE

## 2022-07-07 PROCEDURE — 84443 ASSAY THYROID STIM HORMONE: CPT | Performed by: STUDENT IN AN ORGANIZED HEALTH CARE EDUCATION/TRAINING PROGRAM

## 2022-07-07 PROCEDURE — 86140 C-REACTIVE PROTEIN: CPT | Performed by: EMERGENCY MEDICINE

## 2022-07-07 PROCEDURE — 96368 THER/DIAG CONCURRENT INF: CPT | Performed by: EMERGENCY MEDICINE

## 2022-07-07 PROCEDURE — 96375 TX/PRO/DX INJ NEW DRUG ADDON: CPT | Performed by: EMERGENCY MEDICINE

## 2022-07-07 PROCEDURE — 70470 CT HEAD/BRAIN W/O & W/DYE: CPT

## 2022-07-07 PROCEDURE — 36415 COLL VENOUS BLD VENIPUNCTURE: CPT

## 2022-07-07 PROCEDURE — 85045 AUTOMATED RETICULOCYTE COUNT: CPT | Performed by: STUDENT IN AN ORGANIZED HEALTH CARE EDUCATION/TRAINING PROGRAM

## 2022-07-07 PROCEDURE — 120N000002 HC R&B MED SURG/OB UMMC

## 2022-07-07 PROCEDURE — 85610 PROTHROMBIN TIME: CPT | Performed by: STUDENT IN AN ORGANIZED HEALTH CARE EDUCATION/TRAINING PROGRAM

## 2022-07-07 PROCEDURE — 99285 EMERGENCY DEPT VISIT HI MDM: CPT | Performed by: EMERGENCY MEDICINE

## 2022-07-07 PROCEDURE — 250N000011 HC RX IP 250 OP 636

## 2022-07-07 PROCEDURE — 250N000013 HC RX MED GY IP 250 OP 250 PS 637

## 2022-07-07 PROCEDURE — 250N000011 HC RX IP 250 OP 636: Performed by: INTERNAL MEDICINE

## 2022-07-07 PROCEDURE — 05PYX3Z REMOVAL OF INFUSION DEVICE FROM UPPER VEIN, EXTERNAL APPROACH: ICD-10-PCS | Performed by: RADIOLOGY

## 2022-07-07 PROCEDURE — 258N000003 HC RX IP 258 OP 636: Performed by: EMERGENCY MEDICINE

## 2022-07-07 PROCEDURE — 87637 SARSCOV2&INF A&B&RSV AMP PRB: CPT | Performed by: EMERGENCY MEDICINE

## 2022-07-07 RX ORDER — OXYCODONE HCL 5 MG/5 ML
5 SOLUTION, ORAL ORAL EVERY 4 HOURS PRN
Status: DISCONTINUED | OUTPATIENT
Start: 2022-07-07 | End: 2022-07-07

## 2022-07-07 RX ORDER — LORAZEPAM 0.5 MG/1
1 TABLET ORAL
Status: COMPLETED | OUTPATIENT
Start: 2022-07-07 | End: 2022-07-07

## 2022-07-07 RX ORDER — LIDOCAINE 4 G/G
1-2 PATCH TOPICAL EVERY 24 HOURS
Status: DISCONTINUED | OUTPATIENT
Start: 2022-07-07 | End: 2022-07-13 | Stop reason: HOSPADM

## 2022-07-07 RX ORDER — ALBUTEROL SULFATE 90 UG/1
2 AEROSOL, METERED RESPIRATORY (INHALATION) EVERY 6 HOURS PRN
Status: DISCONTINUED | OUTPATIENT
Start: 2022-07-07 | End: 2022-07-13 | Stop reason: HOSPADM

## 2022-07-07 RX ORDER — ERGOCALCIFEROL 1.25 MG/1
50000 CAPSULE, LIQUID FILLED ORAL WEEKLY
Status: DISCONTINUED | OUTPATIENT
Start: 2022-07-10 | End: 2022-07-13 | Stop reason: HOSPADM

## 2022-07-07 RX ORDER — LIDOCAINE 40 MG/G
CREAM TOPICAL
Status: DISCONTINUED | OUTPATIENT
Start: 2022-07-07 | End: 2022-07-13 | Stop reason: HOSPADM

## 2022-07-07 RX ORDER — ONDANSETRON 2 MG/ML
4 INJECTION INTRAMUSCULAR; INTRAVENOUS EVERY 30 MIN PRN
Status: DISCONTINUED | OUTPATIENT
Start: 2022-07-07 | End: 2022-07-10

## 2022-07-07 RX ORDER — OXYCODONE HCL 5 MG/5 ML
5-10 SOLUTION, ORAL ORAL EVERY 6 HOURS PRN
Status: DISCONTINUED | OUTPATIENT
Start: 2022-07-07 | End: 2022-07-13 | Stop reason: HOSPADM

## 2022-07-07 RX ORDER — DIPHENHYDRAMINE HCL 50 MG
50 CAPSULE ORAL EVERY 12 HOURS
Status: DISCONTINUED | OUTPATIENT
Start: 2022-07-08 | End: 2022-07-13 | Stop reason: HOSPADM

## 2022-07-07 RX ORDER — ASPIRIN 81 MG/1
324 TABLET, CHEWABLE ORAL ONCE
Status: DISCONTINUED | OUTPATIENT
Start: 2022-07-07 | End: 2022-07-07

## 2022-07-07 RX ORDER — IOPAMIDOL 755 MG/ML
75 INJECTION, SOLUTION INTRAVASCULAR ONCE
Status: COMPLETED | OUTPATIENT
Start: 2022-07-07 | End: 2022-07-07

## 2022-07-07 RX ORDER — SODIUM CHLORIDE, SODIUM LACTATE, POTASSIUM CHLORIDE, CALCIUM CHLORIDE 600; 310; 30; 20 MG/100ML; MG/100ML; MG/100ML; MG/100ML
INJECTION, SOLUTION INTRAVENOUS CONTINUOUS
Status: DISCONTINUED | OUTPATIENT
Start: 2022-07-07 | End: 2022-07-07

## 2022-07-07 RX ORDER — ONDANSETRON 4 MG/1
4 TABLET, ORALLY DISINTEGRATING ORAL EVERY 6 HOURS PRN
Status: DISCONTINUED | OUTPATIENT
Start: 2022-07-07 | End: 2022-07-13 | Stop reason: HOSPADM

## 2022-07-07 RX ORDER — ONDANSETRON 2 MG/ML
4 INJECTION INTRAMUSCULAR; INTRAVENOUS EVERY 6 HOURS PRN
Status: DISCONTINUED | OUTPATIENT
Start: 2022-07-07 | End: 2022-07-13 | Stop reason: HOSPADM

## 2022-07-07 RX ORDER — MEROPENEM 1 G/1
1 INJECTION, POWDER, FOR SOLUTION INTRAVENOUS ONCE
Status: COMPLETED | OUTPATIENT
Start: 2022-07-07 | End: 2022-07-07

## 2022-07-07 RX ORDER — POLYETHYLENE GLYCOL 3350 17 G/17G
17 POWDER, FOR SOLUTION ORAL DAILY
Status: DISCONTINUED | OUTPATIENT
Start: 2022-07-08 | End: 2022-07-13 | Stop reason: HOSPADM

## 2022-07-07 RX ORDER — ACETAMINOPHEN 325 MG/10.15ML
500 LIQUID ORAL EVERY 4 HOURS PRN
Status: DISCONTINUED | OUTPATIENT
Start: 2022-07-07 | End: 2022-07-13 | Stop reason: HOSPADM

## 2022-07-07 RX ORDER — FENTANYL 25 UG/1
25 PATCH TRANSDERMAL
Status: DISCONTINUED | OUTPATIENT
Start: 2022-07-08 | End: 2022-07-13 | Stop reason: HOSPADM

## 2022-07-07 RX ORDER — ENOXAPARIN SODIUM 100 MG/ML
40 INJECTION SUBCUTANEOUS EVERY 24 HOURS
Status: DISCONTINUED | OUTPATIENT
Start: 2022-07-07 | End: 2022-07-10

## 2022-07-07 RX ORDER — SODIUM CHLORIDE 9 MG/ML
INJECTION, SOLUTION INTRAVENOUS CONTINUOUS
Status: DISCONTINUED | OUTPATIENT
Start: 2022-07-07 | End: 2022-07-08

## 2022-07-07 RX ORDER — MEROPENEM 1 G/1
1 INJECTION, POWDER, FOR SOLUTION INTRAVENOUS EVERY 8 HOURS
Status: DISCONTINUED | OUTPATIENT
Start: 2022-07-07 | End: 2022-07-08

## 2022-07-07 RX ORDER — SUCRALFATE ORAL 1 G/10ML
1 SUSPENSION ORAL
Status: DISCONTINUED | OUTPATIENT
Start: 2022-07-07 | End: 2022-07-13 | Stop reason: HOSPADM

## 2022-07-07 RX ADMIN — MEROPENEM 1 G: 1 INJECTION, POWDER, FOR SOLUTION INTRAVENOUS at 22:19

## 2022-07-07 RX ADMIN — ACETAMINOPHEN 500 MG: 160 SOLUTION ORAL at 23:52

## 2022-07-07 RX ADMIN — PANTOPRAZOLE SODIUM 40 MG: 40 INJECTION, POWDER, FOR SOLUTION INTRAVENOUS at 20:42

## 2022-07-07 RX ADMIN — OXYCODONE HYDROCHLORIDE 10 MG: 5 SOLUTION ORAL at 20:51

## 2022-07-07 RX ADMIN — DIPHENHYDRAMINE HYDROCHLORIDE 50 MG: 50 INJECTION, SOLUTION INTRAMUSCULAR; INTRAVENOUS at 14:39

## 2022-07-07 RX ADMIN — VANCOMYCIN HYDROCHLORIDE 2000 MG: 1 INJECTION, POWDER, LYOPHILIZED, FOR SOLUTION INTRAVENOUS at 14:49

## 2022-07-07 RX ADMIN — ONDANSETRON 4 MG: 2 INJECTION INTRAMUSCULAR; INTRAVENOUS at 14:21

## 2022-07-07 RX ADMIN — SUCRALFATE 1 G: 1 SUSPENSION ORAL at 23:52

## 2022-07-07 RX ADMIN — OXYCODONE HYDROCHLORIDE 5 MG: 5 SOLUTION ORAL at 14:16

## 2022-07-07 RX ADMIN — SODIUM CHLORIDE 1000 ML: 9 INJECTION, SOLUTION INTRAVENOUS at 14:16

## 2022-07-07 RX ADMIN — SODIUM CHLORIDE: 9 INJECTION, SOLUTION INTRAVENOUS at 14:21

## 2022-07-07 RX ADMIN — LORAZEPAM 1 MG: 0.5 TABLET ORAL at 23:41

## 2022-07-07 RX ADMIN — OXYCODONE HYDROCHLORIDE 5 MG: 5 SOLUTION ORAL at 14:28

## 2022-07-07 RX ADMIN — MEROPENEM 1 G: 1 INJECTION, POWDER, FOR SOLUTION INTRAVENOUS at 14:16

## 2022-07-07 RX ADMIN — ONDANSETRON 4 MG: 2 INJECTION INTRAMUSCULAR; INTRAVENOUS at 20:50

## 2022-07-07 RX ADMIN — IOPAMIDOL 75 ML: 755 INJECTION, SOLUTION INTRAVENOUS at 20:00

## 2022-07-07 ASSESSMENT — ENCOUNTER SYMPTOMS
BACK PAIN: 1
TROUBLE SWALLOWING: 1
SHORTNESS OF BREATH: 0
CONFUSION: 1
VOMITING: 0
FATIGUE: 1
DIARRHEA: 0
COUGH: 1
CHILLS: 1
NAUSEA: 1
NECK STIFFNESS: 0
PHOTOPHOBIA: 1
FEVER: 1
AGITATION: 1
DYSURIA: 0

## 2022-07-07 ASSESSMENT — ACTIVITIES OF DAILY LIVING (ADL)
ADLS_ACUITY_SCORE: 37

## 2022-07-07 NOTE — PROGRESS NOTES
This is a recent snapshot of the patient's [facility] medical record.  For current drug dose and complete information and questions, call [phone] or In Basket pool, [facility] home infusion ([id number])  CSN Number:  [id number]

## 2022-07-07 NOTE — H&P
Resident/Fellow Attestation      I, Ab Medeiros MD, was present with the medical/JACQUI student who participated in the service and in the documentation of the note.  I have verified the history and personally performed the physical exam and medical decision making.  I agree with the assessment and plan of care as documented in the note.      I have reviewed and updated the medical student note as below to reflect the most recent changes in patient cares.     Ab Medeiros MD  PGY2  Date of Service (when I saw the patient): 07/07/22    [facility]    History and Physical - [facility] Service, [facility] TE[facility] 2       Date of Admission:  7/7/2022    Assessment & Plan      Parker Acevedo is a 51 year old male admitted on 7/7/2022. He presents with positive blood cultures, has a history of CLBSI, cardiomyopathy, chronic abdominal pain, GERD, RUE DVT, dysphagia, hyperlipidemia, malnutrition, TPN, short gut syndrome s/p baratric surgery with a chronic G tube vent, and is admitted for bacteremia and concern for encephalitis.     In the ED patient received 1L NS and was started on meropenem and vancomycin with 50 mg benadryl pretreatment due to history of vancomycin infusion reaction. CXR in ED was also concerning for bibasilar pneumonia.     Admitted to inpatient hospital service for ID workup and further treatment. Patient noted to be obtunded but vitally stable on evaluation.     # Polymicrobial bacteremia  There is high concern for bacteremia blood cultures positive for enterobacter, G+ cocci in pairs and chains, and bacillus cereus. Given history of CLBSI, wife's confirmation of similar episodes, and positive blood cultures, bacteremia secondary to Cm infection is most likely. Considering CXR consistent with atelectasis vs pneumonia, history of dysphagia, and increased inflammatory markers, pneumonia leading to bacteremia is also possible.  - ID consulted  - Repeat blood  cultures drawn in ED pending  - Second set of repeat cultures ordered for morning (peripheral venipuncture and cm)  - Vancomycin and meropenum started (7/7- )   - pretreat with benadryl for vanco infusion due to previous infusion reaction  - CMP pending  - CBC w/ diff pending  - [facility] pending    #Severe Sepsis (source, and end organ dysfunction (AMS)  Patient presented to ED with positive blood cultures as listed above. He has been afebrile in the hospital but wife reports several days of fever to 101.7 at home. Patient is tachycardic to 102 with BP ranging 106-135 systolic, 51-97 diastolic. On exam patient was obtunded and disoriented to time and self. He was minimally responsive to questions after physical stimulation. He is oriented to place but disoriented to self and time. Wife states increased fatigue and sleep needs are typical of previous bacteremia episodes but notes AMS has gotten significantly worse since yesterday.   - ID consulted  - Abx and infection workup as seen above  - If patient decompensates, remove Cm line  - IV NS maintenance fluids    # Encephalopathy, unspecified  Patient is disoriented with AMS as described above. There is large concern for encephalitis due to patient disorientation, lack of response to occular neural exam, photophobia, and general obtunded state with positive blood cultures.  - Head CT with and without contrast ordered  - TSH, B12, UA and infectious work-up as above    #Pneumonia  Patient presented with cough on exam that is new per wife. Denies shortness of breath. Crackles heard in lower lobes but respiratory exam was limited by shallow breaths. CXR consistent with atelectasis vs pneumonia. History of dysphagia and visible drooling on exam concerning for aspiration pneumonia. Currently sating  on room air. Procalcitonin 0.57, elevated.   - ID consulted  - Abx started as seen above  - NPO except for meds  - Continuous oxygen monitoring    # C/f GI  Bleed  Patient with hemoglobin baseline around 11-12, admitted with Hgb 9-10. Without recent use of NSAIDs or history of esophageal varices per review of EGD. 2017 US Abdomen without evidence of liver cirrhosis. Recent history of red colored emesis per wife.   - NPO  - 2 PIVs  - IV Pantoprazole  - PRN nausea medications  - Will discuss with GI regarding need for scope    # Pancytopenia  Patient with leukopenia, thrombocytopenia and anemia as above. Likely marrow suppression in setting of acute sepsis though superimposed viral infection could be considered. Labs and exam not consistent with DIC or TTP. HIT considered but less likely in setting of infection with intravascular devices.   - Reticulocyte count with hemolysis labs and smear  - May consider hematology consult if persistent     #TPN  History of short gut syndrome and several GI surgeries including bariatric surgery, appendectomy, and cholecystectomy. Patient receives TPN initially via G tube feed (failed, now only for venting) now via Cm. Additional history of dysphagia. Oral intake for pleasure per wife. TPN stopped 7/5 when patient became febrile. This is based on previous experience with central line infections.   - Nutrition consulted  - Planning for PPN during hospital course  - IV NS for hydration    #GERD  #Chronic abdominal pain  Patient has history of peptic ulcers and GERD. Wife denies any knowledge of recent blood in the stool or recent vomiting. Patient on Lovenox therapy for recent DVT increasing his bleeding risk.   - Pantoprazole as above  - GI Cocktail     Diet:  NPO  DVT Prophylaxis: Enoxaparin (Lovenox) SQ  Sanchez Catheter: Not present  Fluids: NS   Central Lines: PRESENT (Cm)   Cardiac Monitoring: None  Code Status:  Full Code    The patient's care was discussed with the [facility] 2 staff.    Marilou Mcgregor MS3  Medical Student  [facility]  [facility]  Securely  "message with the [employer] Web Console (learn more here)  Text page via [facility] Paging/Directory   Please see signed in provider for up to date coverage information    ______________________________________________________________________    Chief Complaint   Postive blood cultures and fever    History is obtained from patient's wife, Rose, and medical record.     History of Present Illness   Parker Acevedo is a 51 year old male who presents with positive blood cultures from an outside source. He has a history of multi drug resistant CLBSI, cardiomyopathy, chronic abdominal pain, GERD, RUE DVT, dysphagia, hyperlipidemia, malnutrition on TPN, and short gut syndrome s/p baratric surgery with a chronic G tube vent.     His wife reports that he slipped and fell on water 7/2 resulting in an ankle sprain that was treated in [place] ED. On 7/5 patient began \"spiking fevers\" with temperatures ranging from 99.6 to 101.7 at home. At this time, TPN was stopped and home infusion nurse tata two blood cultures due to CLBSI history. Cultures grew G+ cocci in pairs and chains, Enterobacter, and Bacillus cereus leading to ED presentation. She states behavior are symptoms are consistent with previous bacteremia infections, although this is more severe.     In the ED patient appeared severely obtunded and was only oriented to place. He received 1L NS and was started on 1 g meropenem and 2000 mg vancomycin with 50 mg benadryl pretreatment due to history of vancomycin infusion reaction. Patient was incoherent and non cooperative to exam.        Review of Systems  - provided by wife and patient cues. Patient was incoherent and unreliable.  Review of Systems   Constitutional: Positive for chills, fatigue and fever.        Reported fever from from home with Tmax 101.7. Afebrile in ED   HENT: Positive for drooling and trouble swallowing.    Eyes: Positive for photophobia.        Chronic photophobia. Not progressing with this infection per " wife   Respiratory: Positive for cough. Negative for shortness of breath.    Cardiovascular: Negative for leg swelling.   Gastrointestinal: Positive for nausea. Negative for diarrhea and vomiting.   Genitourinary: Negative for dysuria.   Musculoskeletal: Positive for back pain. Negative for neck stiffness.   Skin: Negative for rash.   Psychiatric/Behavioral: Positive for agitation and confusion.       Past Medical History    I have reviewed this patient's medical history and updated it with pertinent information if needed.   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     Complication of anesthesia      Difficulty swallowing      Gastric ulcer, unspecified as acute or chronic, without mention of hemorrhage, perforation, or obstruction      Gastro-oesophageal reflux disease      Head injury      Hiatal hernia      Other bladder disorder      Other chronic pain      PONV (postoperative nausea and vomiting)      Severe malnutrition (H)     TPN     Short gut syndrome      Tobacco abuse        Past Surgical History   I have reviewed this patient's surgical history and updated it with pertinent information if needed.  Past Surgical History:   Procedure Laterality Date     AMPUTATION       APPENDECTOMY       BACK SURGERY  11/3/2014    curve in the spine     BIOPSY LYMPH NODE CERVICAL N/A 2/20/2015    Procedure: BIOPSY LYMPH NODE CERVICAL;  Surgeon: Baron Scanlon MD;  Location:  OR     CHOLECYSTECTOMY       COLONOSCOPY N/A 7/14/2021    Procedure: COLONOSCOPY;  Surgeon: Jimbo Estrada MD;  Location: [facility] OR     COLONOSCOPY N/A 4/13/2022    Procedure: COLONOSCOPY;  Surgeon: Jimbo Estrada MD;  Location: [facility] OR     DISCECTOMY, FUSION  CERVICAL ANTERIOR ONE LEVEL, COMBINED N/A 2/15/2017    Procedure: COMBINED DISCECTOMY, FUSION CERVICAL ANTERIOR ONE LEVEL;  Surgeon: Darren Campos MD;  Location: [facility] OR     ENDOSCOPIC INSERTION TUBE GASTROSTOMY  9/9/2013    Procedure: ENDOSCOPIC INSERTION TUBE GASTROSTOMY;;  Surgeon: Francis Vyas MD;  [facility]     ENDOSCOPIC ULTRASOUND UPPER GASTROINTESTINAL TRACT (GI)  4/29/2011    Procedure:ENDOSCOPIC ULTRASOUND UPPER GASTROINTESTINAL TRACT (GI); Both Procedures done Conjointly; Surgeon:NEREIDA HOUSER; Location:[facility] OR     ENDOSCOPIC ULTRASOUND UPPER GASTROINTESTINAL TRACT (GI)  9/9/2013    Procedure: ENDOSCOPIC ULTRASOUND UPPER GASTROINTESTINAL TRACT (GI);  Endoscopic Ultrasound Guide Gastrostomy Tube Placement  C-arm;  Surgeon: Noe Lizarraga MD;  [facility] OR     ENDOSCOPIC ULTRASOUND UPPER GASTROINTESTINAL TRACT (GI) N/A 2/24/2021    Procedure: ENDOSCOPIC ULTRASOUND, ESOPHAGOSCOPY / UPPER GASTROINTESTINAL TRACT (GI), esophagastrogastroduodenoscopy;  Surgeon: Berny Bach MD;  [facility] OR     ENDOSCOPY  03/25/11    EGD, [place] [facility]     ENDOSCOPY  08/04/09    Upper Endoscopy, [facility]     ENDOSCOPY  01/05/09    Upper Endoscopy, [facility]     ESOPHAGOSCOPY, GASTROSCOPY, DUODENOSCOPY (EGD), COMBINED  4/20/2011    Procedure:COMBINED ESOPHAGOSCOPY, GASTROSCOPY, DUODENOSCOPY (EGD); Surgeon:FRANCIS VYAS; [facility] GI     ESOPHAGOSCOPY, GASTROSCOPY, DUODENOSCOPY (EGD), COMBINED  6/15/2011    Procedure:COMBINED ESOPHAGOSCOPY, GASTROSCOPY, DUODENOSCOPY (EGD); Surgeon:FRANCIS VYAS; [facility]     ESOPHAGOSCOPY, GASTROSCOPY, DUODENOSCOPY (EGD), COMBINED  6/12/2013    Procedure: COMBINED ESOPHAGOSCOPY, GASTROSCOPY, DUODENOSCOPY (EGD);;  Surgeon: Francis Vyas MD;  [facility]     ESOPHAGOSCOPY, GASTROSCOPY, DUODENOSCOPY (EGD), COMBINED  11/22/2013    Procedure: COMBINED ESOPHAGOSCOPY, GASTROSCOPY, DUODENOSCOPY (EGD);;   Surgeon: Francis Vyas MD;  [facility]     ESOPHAGOSCOPY, GASTROSCOPY, DUODENOSCOPY (EGD), COMBINED  4/30/2014    Procedure: COMBINED ESOPHAGOSCOPY, GASTROSCOPY, DUODENOSCOPY (EGD);  Surgeon: Francis Vyas MD;  Location:  GI     ESOPHAGOSCOPY, GASTROSCOPY, DUODENOSCOPY (EGD), COMBINED N/A 2/20/2015    Procedure: COMBINED ESOPHAGOSCOPY, GASTROSCOPY, DUODENOSCOPY (EGD), BIOPSY SINGLE OR MULTIPLE;  Surgeon: Baron Scanlon MD;  Location:  OR     ESOPHAGOSCOPY, GASTROSCOPY, DUODENOSCOPY (EGD), COMBINED N/A 9/30/2015    Procedure: COMBINED ESOPHAGOSCOPY, GASTROSCOPY, DUODENOSCOPY (EGD);  Surgeon: Frnacis Vyas MD;  Location:  GI     ESOPHAGOSCOPY, GASTROSCOPY, DUODENOSCOPY (EGD), COMBINED N/A 10/3/2019    Procedure: Upper Endoscopy;  Surgeon: Clif Morrow MD;  [facility] OR     GASTRECTOMY  6/22/2012    Procedure: GASTRECTOMY;  Open Approach, Excise Ulcers,Partial Gastrectomy, Esophagojejunostomy, Hiatal Hernia Repair, Extensive Lysis of Adhesions and Esaphagogastrodudenoscopy.;  Surgeon: Francis Vyas MD;  [facility]     GASTROJEJUNOSTOMY  08/26/09    Extensice enterolysis, partial resect. jejunum, part. resect gastric pouch, gastrojejunostomy anastomosis     HC ESOPH/GAS REFLUX TEST W NASAL IMPED ELECTRODE  8/5/2013    Procedure: ESOPHAGEAL IMPEDENCE FUNCTION TEST 1 HOUR OR LESS;  Surgeon: Halie Lang MD;  Location:  GI     HEAD & NECK SURGERY  2/15/2017    C5-C6     HERNIA REPAIR  2006    Umbilical hernia     HERNIORRHAPHY HIATAL  6/22/2012    Procedure: HERNIORRHAPHY HIATAL;;  Surgeon: Francis Vyas MD;  Location:  OR     HERNIORRHAPHY INGUINAL  11/22/2013    Procedure: HERNIORRHAPHY INGUINAL;;  Surgeon: Francis Vyas MD;  Location:  OR     INSERT PICC LINE Right 12/19/2019    Procedure: Picc Placement;  Surgeon: Per Dumont PA-C;  [facility]     INSERT PICC LINE Right 2/21/2020    Procedure: INSERTION, PICC;   Surgeon: Per Dumont PA-C;  [facility] OR     INSERT PORT VASCULAR ACCESS Right 12/19/2017    Procedure: INSERT PORT VASCULAR ACCESS;  Right Chest Port Placement ;  Surgeon: Lisandro Alejandro PA-C;  [facility] OR     INSERT PORT VASCULAR ACCESS Right 8/2/2018    Procedure: INSERT PORT VASCULAR ACCESS;  Place single lumen tunneled central venous access catheter;  Surgeon: Guy Jamil PA-C;  [facility]     IR CVC TUNNEL PLACEMENT > 5 YRS OF AGE  8/7/2019     IR CVC TUNNEL PLACEMENT > 5 YRS OF AGE  4/14/2020     IR CVC TUNNEL PLACEMENT > 5 YRS OF AGE  8/3/2020     IR CVC TUNNEL PLACEMENT > 5 YRS OF AGE  9/4/2020     IR CVC TUNNEL PLACEMENT > 5 YRS OF AGE  2/5/2021     IR CVC TUNNEL PLACEMENT > 5 YRS OF AGE  3/23/2021     IR CVC TUNNEL PLACEMENT > 5 YRS OF AGE  1/13/2022     IR CVC TUNNEL PLACEMENT > 5 YRS OF AGE  5/12/2022     IR CVC TUNNEL REMOVAL RIGHT  10/1/2019     IR CVC TUNNEL REMOVAL RIGHT  7/30/2020     IR CVC TUNNEL REMOVAL RIGHT  9/2/2020     IR CVC TUNNEL REMOVAL RIGHT  2/3/2021     IR CVC TUNNEL REMOVAL RIGHT  3/19/2021     IR CVC TUNNEL REMOVAL RIGHT  1/10/2022     IR CVC TUNNEL REMOVAL RIGHT  5/9/2022     IR CVC TUNNEL REVISION RIGHT  5/7/2021     IR FOLLOW UP VISIT OUTPATIENT  8/7/2019     IR PICC PLACEMENT > 5 YRS OF AGE  3/7/2019     IR PICC PLACEMENT > 5 YRS OF AGE  12/19/2019     IR PICC PLACEMENT > 5 YRS OF AGE  2/21/2020     LAPAROTOMY EXPLORATORY  11/22/2013    Procedure: LAPAROTOMY EXPLORATORY;  Exploratory Laparotomy, Upper Endoscopy, Left Inguinal Hernia Repair;  Surgeon: Francis Vyas MD;  [facility]     ORTHOPEDIC SURGERY       PICC INSERTION Right 03/16/2017    5fr DL BioFlo PICC, 42cm (3cm external) in the R medial brachial vein w/ tip in the SVC RA junction.     PICC INSERTION Left 09/23/2017    5fr DL BioFlo PICC, 45cm (1cm external) in the L basilic vein w/ tip in the SVC RA junction.     PICC INSERTION Right 05/16/2019    5Fr - 43cm,  Medial brachial vein, low SVC     PICC INSERTION Right 10/02/2019    5Fr - 43cm (2cm external), basilic vein, low SVC     SHAYLEE EN Y BOWEL  2003     SOFT TISSUE SURGERY       THORACIC SURGERY       TONSILLECTOMY       TRANSESOPHAGEAL ECHOCARDIOGRAM INTRAOPERATIVE N/A 1/8/2019    Procedure: TRANSESOPHAGEAL ECHOCARDIOGRAM INTRAOPERATIVE;  Surgeon: GENERIC ANESTHESIA PROVIDER;  Location:  OR     [facility] GASTRIC BYPASS,OBESE<100CM SHAYLEE-EN-Y  2002    lost 300 pounds        Social History   Patient lives at home in [place], [place] with his son and wife Rose. Wife reports he does not use alcohol or illicit drugs. Patient does smoke cigarettes per chart.     Family History   I have reviewed this patient's family history and updated it with pertinent information if needed.  Family History   Problem Relation Age of Onset     Gastrointestinal Disease Mother         Crohns disease     Anxiety Disorder Mother      Thyroid Disease Mother         Grave's disease     Cancer Father         ear cancer-skin cancer/melanoma     Breast Cancer Maternal Grandmother      Macular Degeneration Maternal Grandfather      Anxiety Disorder Sister      Diabetes Maternal Uncle      Breast Cancer Other      Hypertension No family hx of      Hyperlipidemia No family hx of      Cerebrovascular Disease No family hx of      Prostate Cancer No family hx of      Depression No family hx of      Anesthesia Reaction No family hx of      Asthma No family hx of      Osteoporosis No family hx of      Genetic Disorder No family hx of      Obesity No family hx of      Mental Illness No family hx of      Substance Abuse No family hx of      Glaucoma No family hx of        Prior to Admission Medications   Prior to Admission Medications   Prescriptions Last Dose Informant Patient Reported? Taking?   EPINEPHrine (ANY BX GENERIC EQUIV) 0.3 MG/0.3ML injection 2-pack  Self Yes No   [facility] MANAGED PATIENT  Self No No   Sig: Contact [facility] for  "patient specific medication information at [phone] on admission and discharge from the hospital.  Phones are answered 24 hours a day 7 days a week 365 days a year.    Providers - Choose \"CONTINUE HOME MED (no script)\" at discharge if patient treatment with home infusion will continue.   LORazepam (ATIVAN) 1 MG tablet   No No   Sig: TAKE 1 TABLET (1MG) BY MOUTH ONCE A DAY AS NEEDED FOR ANXIETY WITH TPN AND MEDICATIONS . 30 TO LAST 30 DAYS   acetaminophen (TYLENOL) 32 mg/mL liquid  Self No No   Sig: Take 15.65 mLs (500 mg) by mouth every 4 hours as needed for fever or mild pain   albuterol (PROAIR HFA/PROVENTIL HFA/VENTOLIN HFA) 108 (90 Base) MCG/ACT inhaler   No No   Sig: Inhale 2 puffs into the lungs every 6 hours as needed   amphetamine-dextroamphetamine (ADDERALL) 20 MG tablet   No No   Sig: TAKE ONE TABLET BY MOUTH ONCE DAILY   bisacodyl (DULCOLAX) 5 MG EC tablet   No No   Sig: Take as directed. One day prior to exam at 10:00am take 2 tablets   carvedilol (COREG) 6.25 MG tablet  Self Yes No   Sig: Take 12.5 mg by mouth 2 times daily (with meals)    cyanocobalamin (CYANOCOBALAMIN) 1000 MCG/ML injection  Self No No   Sig: INJECT 1 ML INTO THE MUSCLE EVERY 30 DAYS   enoxaparin ANTICOAGULANT (LOVENOX) 120 MG/0.8ML syringe   No No   Sig: INJECT THE CONTENT OF ONE SYRINGE 0.8 MLS (120 MG) UNDER THE SKIN ONCE DAILY   fentaNYL (DURAGESIC) 25 mcg/hr 72 hr patch   No No   Sig: Place 1 patch onto the skin every 48 hours remove old patch. Fill 06/29/22 and start 07/01/22. 30 days for chronic pain   insulin syringe-needle U-100 (29G X 1/2\" 1 ML) 29G X 1/2\" 1 ML miscellaneous  Self No No   Sig: Use to inject b12 every 30 days   lactated ringers infusion  Self No No   Sig: Inject 1,000-2,000 mLs into the vein daily as needed   lidocaine (LIDODERM) 5 % patch  Self No No   Sig: Place 1-2 patches onto the skin every 24 hours Wear for 12 hours, remove for 12 hours.  OK to cut to better fit to size.   magnesium citrate " solution   No No   Sig: Take as directed. Two days prior to exam drink 10oz bottle of magnesium citrate at 4:00pm   naloxone (NARCAN) 4 MG/0.1ML nasal spray  Self No No   Sig: Spray 1 spray (4 mg) into one nostril alternating nostrils once as needed for opioid reversal every 2-3 minutes until assistance arrives   nystatin (MYCOSTATIN) 785504 UNIT/GM external cream  Self No No   Sig: APPLY TOPICALLY 2 TIMES DAILY   ondansetron (ZOFRAN-ODT) 8 MG ODT tab  Self No No   Sig: DISSOLVE ONE TABLET ON TONGUE EVERY 8 HOURS AS NEEDED FOR NAUSEA   oxyCODONE (ROXICODONE) 5 MG/5ML solution   No No   Sig: Take 5-10 mLs (5-10 mg) by mouth 4 times daily as needed for pain Max of 40mg/day. Put at least 4 hours between doses. Fill 06/29/22 and start 07/01/22. 30 day supply for chronic pan   pantoprazole (PROTONIX) 40 MG EC tablet  Self No No   Sig: Take 1 tablet (40 mg) by mouth daily   parenteral nutrition - PTA/DISCHARGE ORDER  Self Yes No   Sig: Patient receives 2050 mL TPN every 14 hours through PICC at [facility].   polyethylene glycol (GOLYTELY) 236 g suspension   No No   Sig: Take as directed. One day before your exam fill the first container with water. Cover and shake until mixed well. At 3:00pm drink one 8oz glass every 10-15 minutes until half of the first container is empty. Store the remainder in the refrigerator. At 8:00pm drink the second half of the first container until it is gone. Before you go to bed mix the second container with water and put in refrigerator. Six hours before your check in time drink one 8oz glass every 10-15 minutes until half of container is empty. Discard the remainder of solution.   polyethylene glycol (MIRALAX) 17 GM/Dose powder  Self No No   Sig: Take 17 g by mouth daily   sucralfate (CARAFATE) 1 GM/10ML suspension  Self No No   Sig: TAKE 10MLS  BY MOUTH FOUR TIMES A DAY AS NEEDED   vitamin D2 (ERGOCALCIFEROL) 11332 units (1250 mcg) capsule  Self No No   Sig: TAKE 1 CAPSULE  (50,000 UNITS) BY MOUTH ONCE A WEEK      Facility-Administered Medications: None     Allergies   Allergies   Allergen Reactions     Bactrim [Sulfamethoxazole W/Trimethoprim] Rash     Penicillins Anaphylaxis     Please see Antimicrobial Management Team allergy assessment note 10/10/2018. Patient reported tolerating amoxicillin.     Doxycycline Rash     Vancomycin Rash     Rash after receiving vancomycin 3/28/16 (infusion reaction?). Tolerated with slower infusion and diphenhydramine premed.       Physical Exam   Vital Signs: Temp: 97.6  F (36.4  C) Temp src: Oral BP: 105/51 Pulse: 100   Resp: 18 SpO2: 98 % O2 Device: None (Room air)    Weight: 0 lbs 0 oz    Physical Exam  Constitutional:       General: He is not in acute distress.     Appearance: He is ill-appearing.      Comments: Obtunded and lethargic. Briefly alert when physically stimulated but does not respond to most questions.    HENT:      Head: Atraumatic.      Mouth/Throat:      Mouth: Mucous membranes are moist.      Comments: Orange drool on side of mouth. Not vomitus per wife.   Eyes:      Comments: Chronic photophobia. Not responsive to EOM commands. Bilateral miosis.   Cardiovascular:      Rate and Rhythm: Normal rate and regular rhythm.      Pulses: Normal pulses.      Heart sounds: Normal heart sounds.   Pulmonary:      Comments: Limited by shallow breathing. Some crackles heard.   Abdominal:      General: There is no distension.      Palpations: Abdomen is soft. There is no mass.      Tenderness: There is no abdominal tenderness.      Comments: G tube port in LUQ with no evidence of infection   Musculoskeletal:         General: Tenderness present.      Cervical back: No rigidity.      Right lower leg: Edema present.      Left lower leg: Edema present.      Comments: +1 pretibial edema, L ankle sprain (wears boot) with mild tenderness   Skin:     General: Skin is warm.      Coloration: Skin is not jaundiced.      Findings: No rash.   Neurological:       General: No focal deficit present.      Mental Status: He is disoriented.      Comments: Oriented only to place. Not following commands.          Data   Data reviewed today: I reviewed all medications, new labs and imaging results over the last 24 hours. I personally reviewed the chest x-ray image(s) showing atelectasis vs pneumonia.    Recent Labs   Lab 07/07/22  1231 07/05/22  1515   WBC 5.2 6.6   HGB 9.9* 10.1*   MCV 98 99   * 105*   * 138   POTASSIUM 3.4 3.3*   CHLORIDE 101 103   CO2 26 26   BUN 11.9 9.1   CR 0.77 0.73   ANIONGAP 7 9   SILAS 8.3* 8.5*   GLC 83 94   ALBUMIN 3.5 3.5   PROTTOTAL 5.7* 5.8*   BILITOTAL 0.2 0.5  0.5   ALKPHOS 52 53   ALT 34 27   AST 33 26     Internal Medicine Staff Addendum  Date of Service: 7/7/2022  I have seen and examined Parker Acevedo with the resident team, reviewed the data and discussed the plan of care with the patient and the care team on P&FC Rounds.  I agree with the above documentation     I discussed pt's care with bedside RN, case management/social work today.  I personally reviewed labs, medications and past 24 hr notes.  Assessment/Plan/Diagnoses: plan/dx as above, which contains my edits and reflects our joint medical decision-making.     Tino Chisholm MD  Internal Medicine/Pediatrics Hospitalist & Staff Physician   of Internal Medicine and Pediatrics  [facility]  Pager: [phone]

## 2022-07-07 NOTE — ED PROVIDER NOTES
[place] EMERGENCY DEPARTMENT ([facility])  7/07/22 ED 10      History     Chief Complaint   Patient presents with     Abnormal Labs     The history is provided by the patient and medical records.     Parker Acevedo is a 51 year old male with a PMH of severe malnutrition, cardiomyopathy, short gut syndrome (has G tube), peptic ulcer disease, GERD, bile reflux esophagitis, dysphagia, HLD, acute DVT of axillary vein of R upper extremity, double lumen CVC, CRBSI, anemia, fungemia, s/p bariatric surgery, bacteremia associated with intravascular line, and chronic abdominal pain who presents to the ED after receiving positive blood cultures. Blood cultures were drawn yesterday that became positive today for several organisms including a gram-negative bacillus but also gram-positive cocci in pairs and chains according to Epic results.  Patient states that he developed fever to 102  F 2 days ago associated with a slight cough.  Patient states that he gets ongoing infusions and the infusion people came out and tata blood cultures on him yesterday which became positive today.  The patient's past medical history is significant for chronic abdominal pain. He is status post gastric bypass with a chronic drain in for his short gut syndrome and is on TPN with a history of gastric outlet obstruction.  The patient is status post cholecystectomy and presents here to the ER for evaluation of his positive blood cultures.    This part of the document was transcribed by Jackie Stafford, Medical Scribe.      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     Complication of anesthesia      Difficulty swallowing      Gastric ulcer, unspecified as acute or chronic,  "without mention of hemorrhage, perforation, or obstruction      Gastro-oesophageal reflux disease      Head injury      Hiatal hernia      Other bladder disorder      Other chronic pain      PONV (postoperative nausea and vomiting)      Severe malnutrition (H)     TPN     Short gut syndrome      Tobacco abuse      Current Outpatient Medications   Medication Sig Dispense Refill     acetaminophen (TYLENOL) 32 mg/mL liquid Take 15.65 mLs (500 mg) by mouth every 4 hours as needed for fever or mild pain 455 mL 1     albuterol (PROAIR HFA/PROVENTIL HFA/VENTOLIN HFA) 108 (90 Base) MCG/ACT inhaler Inhale 2 puffs into the lungs every 6 hours as needed 18 g 1     amphetamine-dextroamphetamine (ADDERALL) 20 MG tablet TAKE ONE TABLET BY MOUTH ONCE DAILY 30 tablet 0     bisacodyl (DULCOLAX) 5 MG EC tablet Take as directed. One day prior to exam at 10:00am take 2 tablets 2 tablet 0     carvedilol (COREG) 6.25 MG tablet Take 12.5 mg by mouth 2 times daily (with meals)        cyanocobalamin (CYANOCOBALAMIN) 1000 MCG/ML injection INJECT 1 ML INTO THE MUSCLE EVERY 30 DAYS 1 mL 3     enoxaparin ANTICOAGULANT (LOVENOX) 120 MG/0.8ML syringe INJECT THE CONTENT OF ONE SYRINGE 0.8 MLS (120 MG) UNDER THE SKIN ONCE DAILY 12 mL 0     EPINEPHrine (ANY BX GENERIC EQUIV) 0.3 MG/0.3ML injection 2-pack        [place] [facility] MANAGED PATIENT Contact [facility] for patient specific medication information at [phone] on admission and discharge from the hospital.  Phones are answered 24 hours a day 7 days a week 365 days a year.    Providers - Choose \"CONTINUE HOME MED (no script)\" at discharge if patient treatment with home infusion will continue.       fentaNYL (DURAGESIC) 25 mcg/hr 72 hr patch Place 1 patch onto the skin every 48 hours remove old patch. Fill 06/29/22 and start 07/01/22. 30 days for chronic pain 15 patch 0     insulin syringe-needle U-100 (29G X 1/2\" 1 ML) 29G X 1/2\" 1 ML miscellaneous Use to inject b12 " every 30 days 3 each 4     lactated ringers infusion Inject 1,000-2,000 mLs into the vein daily as needed       lidocaine (LIDODERM) 5 % patch Place 1-2 patches onto the skin every 24 hours Wear for 12 hours, remove for 12 hours.  OK to cut to better fit to size. 60 patch 6     LORazepam (ATIVAN) 1 MG tablet TAKE 1 TABLET (1MG) BY MOUTH ONCE A DAY AS NEEDED FOR ANXIETY WITH TPN AND MEDICATIONS . 30 TO LAST 30 DAYS 30 tablet 0     magnesium citrate solution Take as directed. Two days prior to exam drink 10oz bottle of magnesium citrate at 4:00pm 296 mL 0     naloxone (NARCAN) 4 MG/0.1ML nasal spray Spray 1 spray (4 mg) into one nostril alternating nostrils once as needed for opioid reversal every 2-3 minutes until assistance arrives 0.2 mL 0     nystatin (MYCOSTATIN) 957957 UNIT/GM external cream APPLY TOPICALLY 2 TIMES DAILY 30 g 0     ondansetron (ZOFRAN-ODT) 8 MG ODT tab DISSOLVE ONE TABLET ON TONGUE EVERY 8 HOURS AS NEEDED FOR NAUSEA 90 tablet 1     oxyCODONE (ROXICODONE) 5 MG/5ML solution Take 5-10 mLs (5-10 mg) by mouth 4 times daily as needed for pain Max of 40mg/day. Put at least 4 hours between doses. Fill 06/29/22 and start 07/01/22. 30 day supply for chronic pan 1200 mL 0     pantoprazole (PROTONIX) 40 MG EC tablet Take 1 tablet (40 mg) by mouth daily 30 tablet 5     parenteral nutrition - PTA/DISCHARGE ORDER Patient receives 2050 mL TPN every 14 hours through PICC at [facility].       polyethylene glycol (GOLYTELY) 236 g suspension Take as directed. One day before your exam fill the first container with water. Cover and shake until mixed well. At 3:00pm drink one 8oz glass every 10-15 minutes until half of the first container is empty. Store the remainder in the refrigerator. At 8:00pm drink the second half of the first container until it is gone. Before you go to bed mix the second container with water and put in refrigerator. Six hours before your check in time drink one 8oz glass every  10-15 minutes until half of container is empty. Discard the remainder of solution. 8000 mL 0     polyethylene glycol (MIRALAX) 17 GM/Dose powder Take 17 g by mouth daily 1020 g 3     sucralfate (CARAFATE) 1 GM/10ML suspension TAKE 10MLS  BY MOUTH FOUR TIMES A DAY AS NEEDED 420 mL 1     vitamin D2 (ERGOCALCIFEROL) 02837 units (1250 mcg) capsule TAKE 1 CAPSULE (50,000 UNITS) BY MOUTH ONCE A WEEK 12 capsule 3     Allergies   Allergen Reactions     Bactrim [Sulfamethoxazole W/Trimethoprim] Rash     Penicillins Anaphylaxis     Please see Antimicrobial Management Team allergy assessment note 10/10/2018. Patient reported tolerating amoxicillin.     Doxycycline Rash     Vancomycin Rash     Rash after receiving vancomycin 3/28/16 (infusion reaction?). Tolerated with slower infusion and diphenhydramine premed.     Social History     Socioeconomic History     Marital status:      Spouse name: Rose     Number of children: 1     Years of education: Not on file     Highest education level: GED or equivalent   Occupational History     Not on file   Tobacco Use     Smoking status: Light Tobacco Smoker     Packs/day: 0.10     Years: 3.00     Pack years: 0.30     Types: Cigarettes     Smokeless tobacco: Never Used     Tobacco comment: 2/4/2021    smokes 3 cigarettes/day   Vaping Use     Vaping Use: Never used   Substance and Sexual Activity     Alcohol use: No     Comment: quit 2002     Drug use: No     Sexual activity: Yes     Partners: Female     Birth control/protection: None     Comment: no protection   Other Topics Concern     Parent/sibling w/ CABG, MI or angioplasty before 65F 55M? No   Social History Narrative     Not on file     Social Determinants of Health     Financial Resource Strain: Medium Risk     Difficulty of Paying Living Expenses: Somewhat hard   Food Insecurity: No Food Insecurity     Worried About Running Out of Food in the Last Year: Never true     Ran Out of Food in the Last Year: Never true    Transportation Needs: No Transportation Needs     Lack of Transportation (Medical): No     Lack of Transportation (Non-Medical): No   Physical Activity: Not on file   Stress: Not on file   Social Connections: Not on file   Intimate Partner Violence: Not At Risk     Fear of Current or Ex-Partner: No     Emotionally Abused: No     Physically Abused: No     Sexually Abused: No   Housing Stability: Not on file     Past Surgical History:   Procedure Laterality Date     AMPUTATION       APPENDECTOMY       BACK SURGERY  11/3/2014    curve in the spine     BIOPSY LYMPH NODE CERVICAL N/A 2/20/2015    Procedure: BIOPSY LYMPH NODE CERVICAL;  Surgeon: Baron Scanlon MD;  [facility]     CHOLECYSTECTOMY       COLONOSCOPY N/A 7/14/2021    Procedure: COLONOSCOPY;  Surgeon: Jimbo Estrada MD;  Location: [facility] OR     COLONOSCOPY N/A 4/13/2022    Procedure: COLONOSCOPY;  Surgeon: Jimbo Estrada MD;  Location: [facility] OR     DISCECTOMY, FUSION CERVICAL ANTERIOR ONE LEVEL, COMBINED N/A 2/15/2017    Procedure: COMBINED DISCECTOMY, FUSION CERVICAL ANTERIOR ONE LEVEL;  Surgeon: Darren Campos MD;  [facility]     ENDOSCOPIC INSERTION TUBE GASTROSTOMY  9/9/2013    Procedure: ENDOSCOPIC INSERTION TUBE GASTROSTOMY;;  Surgeon: Francis Vyas MD;  [facility]     ENDOSCOPIC ULTRASOUND UPPER GASTROINTESTINAL TRACT (GI)  4/29/2011    Procedure:ENDOSCOPIC ULTRASOUND UPPER GASTROINTESTINAL TRACT (GI); Both Procedures done Conjointly; Surgeon:NEREIDA HOUSER; [facility]     ENDOSCOPIC ULTRASOUND UPPER GASTROINTESTINAL TRACT (GI)  9/9/2013    Procedure: ENDOSCOPIC ULTRASOUND UPPER GASTROINTESTINAL TRACT (GI);  Endoscopic Ultrasound Guide Gastrostomy Tube Placement  C-arm;  Surgeon: Noe Lizarraga MD;  [facility]     ENDOSCOPIC ULTRASOUND UPPER GASTROINTESTINAL TRACT (GI) N/A 2/24/2021    Procedure: ENDOSCOPIC ULTRASOUND, ESOPHAGOSCOPY / UPPER GASTROINTESTINAL TRACT (GI),  esophagastrogastroduodenoscopy;  Surgeon: Berny Bach MD;  [facility]     ENDOSCOPY  03/25/11    EGD, [facility]     ENDOSCOPY  08/04/09    Upper Endoscopy, [facility]     ENDOSCOPY  01/05/09    Upper Endoscopy, [facility]     ESOPHAGOSCOPY, GASTROSCOPY, DUODENOSCOPY (EGD), COMBINED  4/20/2011    Procedure:COMBINED ESOPHAGOSCOPY, GASTROSCOPY, DUODENOSCOPY (EGD); Surgeon:BLU VYAS; [facility]     ESOPHAGOSCOPY, GASTROSCOPY, DUODENOSCOPY (EGD), COMBINED  6/15/2011    Procedure:COMBINED ESOPHAGOSCOPY, GASTROSCOPY, DUODENOSCOPY (EGD); Surgeon:BLU VYAS; [facility]     ESOPHAGOSCOPY, GASTROSCOPY, DUODENOSCOPY (EGD), COMBINED  6/12/2013    Procedure: COMBINED ESOPHAGOSCOPY, GASTROSCOPY, DUODENOSCOPY (EGD);;  Surgeon: Blu Vyas MD;  [facility]     ESOPHAGOSCOPY, GASTROSCOPY, DUODENOSCOPY (EGD), COMBINED  11/22/2013    Procedure: COMBINED ESOPHAGOSCOPY, GASTROSCOPY, DUODENOSCOPY (EGD);;  Surgeon: Blu Vyas MD;  Location:  OR     ESOPHAGOSCOPY, GASTROSCOPY, DUODENOSCOPY (EGD), COMBINED  4/30/2014    Procedure: COMBINED ESOPHAGOSCOPY, GASTROSCOPY, DUODENOSCOPY (EGD);  Surgeon: Blu Vyas MD;  [facility]     ESOPHAGOSCOPY, GASTROSCOPY, DUODENOSCOPY (EGD), COMBINED N/A 2/20/2015    Procedure: COMBINED ESOPHAGOSCOPY, GASTROSCOPY, DUODENOSCOPY (EGD), BIOPSY SINGLE OR MULTIPLE;  Surgeon: Baron Scanlon MD;  Location:  OR     ESOPHAGOSCOPY, GASTROSCOPY, DUODENOSCOPY (EGD), COMBINED N/A 9/30/2015    Procedure: COMBINED ESOPHAGOSCOPY, GASTROSCOPY, DUODENOSCOPY (EGD);  Surgeon: Blu Vyas MD;  [facility]     ESOPHAGOSCOPY, GASTROSCOPY, DUODENOSCOPY (EGD), COMBINED N/A 10/3/2019    Procedure: Upper Endoscopy;  Surgeon: Clif Morrow MD;  [facility]     GASTRECTOMY  6/22/2012    Procedure: GASTRECTOMY;  Open Approach, Excise Ulcers,Partial Gastrectomy, Esophagojejunostomy, Hiatal  Hernia Repair, Extensive Lysis of Adhesions and Esaphagogastrodudenoscopy.;  Surgeon: Francis Vyas MD;  [facility]     GASTROJEJUNOSTOMY  08/26/09    Extensice enterolysis, partial resect. jejunum, part. resect gastric pouch, gastrojejunostomy anastomosis     HC ESOPH/GAS REFLUX TEST W NASAL IMPED ELECTRODE  8/5/2013    Procedure: ESOPHAGEAL IMPEDENCE FUNCTION TEST 1 HOUR OR LESS;  Surgeon: Halie Lang MD;  [facility]     HEAD & NECK SURGERY  2/15/2017    C5-C6     HERNIA REPAIR  2006    Umbilical hernia     HERNIORRHAPHY HIATAL  6/22/2012    Procedure: HERNIORRHAPHY HIATAL;;  Surgeon: Francis Vyas MD;  [facility]     HERNIORRHAPHY INGUINAL  11/22/2013    Procedure: HERNIORRHAPHY INGUINAL;;  Surgeon: Francis Vyas MD;  [facility]     INSERT PICC LINE Right 12/19/2019    Procedure: Picc Placement;  Surgeon: Per Dumont PA-C;  [facility]     INSERT PICC LINE Right 2/21/2020    Procedure: INSERTION, PICC;  Surgeon: Per Dumont PA-C;  [facility]     INSERT PORT VASCULAR ACCESS Right 12/19/2017    Procedure: INSERT PORT VASCULAR ACCESS;  Right Chest Port Placement ;  Surgeon: Lisandro Alejandro PA-C;  [facility]     INSERT PORT VASCULAR ACCESS Right 8/2/2018    Procedure: INSERT PORT VASCULAR ACCESS;  Place single lumen tunneled central venous access catheter;  Surgeon: Guy Jamil PA-C;  [facility]     IR CVC TUNNEL PLACEMENT > 5 YRS OF AGE  8/7/2019     IR CVC TUNNEL PLACEMENT > 5 YRS OF AGE  4/14/2020     IR CVC TUNNEL PLACEMENT > 5 YRS OF AGE  8/3/2020     IR CVC TUNNEL PLACEMENT > 5 YRS OF AGE  9/4/2020     IR CVC TUNNEL PLACEMENT > 5 YRS OF AGE  2/5/2021     IR CVC TUNNEL PLACEMENT > 5 YRS OF AGE  3/23/2021     IR CVC TUNNEL PLACEMENT > 5 YRS OF AGE  1/13/2022     IR CVC TUNNEL PLACEMENT > 5 YRS OF AGE  5/12/2022     IR CVC TUNNEL REMOVAL RIGHT  10/1/2019     IR CVC TUNNEL REMOVAL RIGHT  7/30/2020     IR CVC  TUNNEL REMOVAL RIGHT  9/2/2020     IR CVC TUNNEL REMOVAL RIGHT  2/3/2021     IR CVC TUNNEL REMOVAL RIGHT  3/19/2021     IR CVC TUNNEL REMOVAL RIGHT  1/10/2022     IR CVC TUNNEL REMOVAL RIGHT  5/9/2022     IR CVC TUNNEL REVISION RIGHT  5/7/2021     IR FOLLOW UP VISIT OUTPATIENT  8/7/2019     IR PICC PLACEMENT > 5 YRS OF AGE  3/7/2019     IR PICC PLACEMENT > 5 YRS OF AGE  12/19/2019     IR PICC PLACEMENT > 5 YRS OF AGE  2/21/2020     LAPAROTOMY EXPLORATORY  11/22/2013    Procedure: LAPAROTOMY EXPLORATORY;  Exploratory Laparotomy, Upper Endoscopy, Left Inguinal Hernia Repair;  Surgeon: Francis Vyas MD;  [facility]     ORTHOPEDIC SURGERY       PICC INSERTION Right 03/16/2017    5fr DL BioFlo PICC, 42cm (3cm external) in the R medial brachial vein w/ tip in the SVC RA junction.     PICC INSERTION Left 09/23/2017    5fr DL BioFlo PICC, 45cm (1cm external) in the L basilic vein w/ tip in the SVC RA junction.     PICC INSERTION Right 05/16/2019    5Fr - 43cm, Medial brachial vein, low SVC     PICC INSERTION Right 10/02/2019    5Fr - 43cm (2cm external), basilic vein, low SVC     SHAYLEE EN Y BOWEL  2003     SOFT TISSUE SURGERY       THORACIC SURGERY       TONSILLECTOMY       TRANSESOPHAGEAL ECHOCARDIOGRAM INTRAOPERATIVE N/A 1/8/2019    Procedure: TRANSESOPHAGEAL ECHOCARDIOGRAM INTRAOPERATIVE;  Surgeon: GENERIC ANESTHESIA PROVIDER;  [facility]     ZC GASTRIC BYPASS,OBESE<100CM SHAYLEE-EN-Y  2002    lost 300 pounds     Family History   Problem Relation Age of Onset     Gastrointestinal Disease Mother         Crohns disease     Anxiety Disorder Mother      Thyroid Disease Mother         Grave's disease     Cancer Father         ear cancer-skin cancer/melanoma     Breast Cancer Maternal Grandmother      Macular Degeneration Maternal Grandfather      Anxiety Disorder Sister      Diabetes Maternal Uncle      Breast Cancer Other      Hypertension No family hx of      Hyperlipidemia No family hx of       Cerebrovascular Disease No family hx of      Prostate Cancer No family hx of      Depression No family hx of      Anesthesia Reaction No family hx of      Asthma No family hx of      Osteoporosis No family hx of      Genetic Disorder No family hx of      Obesity No family hx of      Mental Illness No family hx of      Substance Abuse No family hx of      Glaucoma No family hx of             Review of Systems   Constitutional: Positive for fever.   All other systems reviewed and are negative.    A complete review of systems was performed with pertinent positives and negatives noted in the HPI, and all other systems negative.    Physical Exam   BP: 117/73  Pulse: 71  Temp: 98.7  F (37.1  C)  Resp: 16  SpO2: 100 %  Physical Exam  Vitals and nursing note reviewed.   Constitutional:       Appearance: He is ill-appearing. He is not diaphoretic.   HENT:      Head: Atraumatic.   Eyes:      Extraocular Movements: Extraocular movements intact.      Pupils: Pupils are equal, round, and reactive to light.   Pulmonary:      Comments: Crackles in the bases bilaterally but good aeration    Has double-lumen line in the right superior anterior chest  Abdominal:      Palpations: Abdomen is soft.      Comments: Has epigastric drain of bilious material   Musculoskeletal:         General: No deformity.      Cervical back: Neck supple.   Neurological:      General: No focal deficit present.      Mental Status: He is alert and oriented to person, place, and time.   Psychiatric:         Mood and Affect: Mood normal.         ED Course      Procedures       Orders Placed This Encounter   Procedures     XR Chest 2 Views     [place] Draw     Extra Blood Culture Bottle     Extra Blue Top Tube     Extra Red Top Tube     Extra Green Top (Lithium Heparin) Tube     Extra Purple Top Tube     Procalcitonin     Comprehensive metabolic panel     CRP inflammation     CBC with platelets and differential     UA with Microscopic reflex to Culture      Influenza A/B & SARS-CoV2 (COVID-19) Virus PCR Multiplex     Peripheral IV catheter     Pharmacy to dose vancomycin     Admit to Inpatient     CBC with platelets differential     Results for orders placed or performed during the hospital encounter of 07/07/22 (from the past 24 hour(s))   [place] Draw    Narrative    The following orders were created for panel order [place] Draw.  Procedure                               Abnormality         Status                     ---------                               -----------         ------                     Extra Blood Culture Bottle[[id number]]                       In process                 Extra Blue Top Tube[[id number]]                              Final result               Extra Red Top Tube[[id number]]                               Final result               Extra Green Top (Lithium...[[id number]]                      Final result               Extra Purple Top Tube[[id number]]                            Final result                 Please view results for these tests on the individual orders.   Extra Blue Top Tube   Result Value Ref Range    Hold Specimen JIC    Extra Red Top Tube   Result Value Ref Range    Hold Specimen JIC    Extra Green Top (Lithium Heparin) Tube   Result Value Ref Range    Hold Specimen JIC    Extra Purple Top Tube   Result Value Ref Range    Hold Specimen JIC    Procalcitonin   Result Value Ref Range    Procalcitonin 0.56 (H) <0.05 ng/mL   CBC with platelets differential    Narrative    The following orders were created for panel order CBC with platelets differential.  Procedure                               Abnormality         Status                     ---------                               -----------         ------                     CBC with platelets and d...[[id number]]  Abnormal            Final result                 Please view results for these tests on the individual orders.   Comprehensive metabolic panel   Result Value Ref Range     Sodium 134 (L) 136 - 145 mmol/L    Potassium 3.4 3.4 - 5.3 mmol/L    Creatinine 0.77 0.67 - 1.17 mg/dL    Urea Nitrogen 11.9 6.0 - 20.0 mg/dL    Chloride 101 98 - 107 mmol/L    Carbon Dioxide (CO2) 26 22 - 29 mmol/L    Anion Gap 7 7 - 15 mmol/L    Glucose 83 70 - 99 mg/dL    Calcium 8.3 (L) 8.6 - 10.0 mg/dL    Protein Total 5.7 (L) 6.4 - 8.3 g/dL    Albumin 3.5 3.5 - 5.2 g/dL    Bilirubin Total 0.2 <=1.2 mg/dL    Alkaline Phosphatase 52 40 - 129 U/L    AST 33 10 - 50 U/L    ALT 34 10 - 50 U/L    GFR Estimate >90 >60 mL/min/1.73m2   CRP inflammation   Result Value Ref Range    CRP Inflammation 47.70 (H) <5.00 mg/L   CBC with platelets and differential   Result Value Ref Range    WBC Count 5.2 4.0 - 11.0 10e3/uL    RBC Count 3.13 (L) 4.40 - 5.90 10e6/uL    Hemoglobin 9.9 (L) 13.3 - 17.7 g/dL    Hematocrit 30.7 (L) 40.0 - 53.0 %    MCV 98 78 - 100 fL    MCH 31.6 26.5 - 33.0 pg    MCHC 32.2 31.5 - 36.5 g/dL    RDW 14.6 10.0 - 15.0 %    Platelet Count 114 (L) 150 - 450 10e3/uL    % Neutrophils 59 %    % Lymphocytes 23 %    % Monocytes 15 %    % Eosinophils 3 %    % Basophils 0 %    % Immature Granulocytes 0 %    NRBCs per 100 WBC 0 <1 /100    Absolute Neutrophils 3.1 1.6 - 8.3 10e3/uL    Absolute Lymphocytes 1.2 0.8 - 5.3 10e3/uL    Absolute Monocytes 0.8 0.0 - 1.3 10e3/uL    Absolute Eosinophils 0.2 0.0 - 0.7 10e3/uL    Absolute Basophils 0.0 0.0 - 0.2 10e3/uL    Absolute Immature Granulocytes 0.0 <=0.4 10e3/uL    Absolute NRBCs 0.0 10e3/uL   XR Chest 2 Views    Narrative    Chest PA and lateral    HISTORY: Positive blood culture    COMPARISON STUDY: 5/7/2022    FINDINGS: Cardiac silhouette is nonenlarged. Right IJ central line tip  in the mid SVC. Small hiatal hernia. There is bibasilar atelectasis  and or consolidation. Lower cervical spinal fusion changes.      Impression    IMPRESSION: Bibasilar atelectasis and/or pneumonia.    MEGAN CANALES MD         SYSTEM ID:  [id number]     *Note: Due to a large number of  results and/or encounters for the requested time period, some results have not been displayed. A complete set of results can be found in Results Review.     Medications   meropenem (MERREM) 1 g vial to attach to  mL bag (1 g Intravenous New Bag 7/7/22 1416)   ondansetron (ZOFRAN) injection 4 mg (4 mg Intravenous Given 7/7/22 1421)   0.9% sodium chloride BOLUS (1,000 mLs Intravenous New Bag 7/7/22 1416)   sodium chloride 0.9% infusion ( Intravenous New Bag 7/7/22 1421)   vancomycin (VANCOCIN) 2,000 mg in sodium chloride 0.9 % 500 mL intermittent infusion (has no administration in time range)   diphenhydrAMINE (BENADRYL) 50 mg in sodium chloride 0.9 % intermittent infusion (has no administration in time range)   pharmacy alert - intermittent dosing (has no administration in time range)   oxyCODONE (ROXICODONE) solution 5-10 mg (5 mg Oral Given 7/7/22 1428)   diphenhydrAMINE (BENADRYL) capsule 50 mg (has no administration in time range)   vancomycin 1250 mg in 0.9% NaCl 250 mL intermittent infusion 1,250 mg (has no administration in time range)          Assessments & Plan (with Medical Decision Making)     I have reviewed the nursing notes.    Based on the patient's blood cultures turning positive yesterday, along with his elevated inflammatory marker and procalcitonin today, patient was placed on IV antibiotics after repeat cultures were drawn.  The patient's initial asymptomatic COVID test was communicated to the lab as actually being symptomatic and so the influenza will be added as well.    At this time the patient will be admitted to the medicine service    I have reviewed the findings, diagnosis, and plan with the patient.      Final diagnoses:   Gram-negative bacteremia       OSCAR RIVERO MD       [facility] EMERGENCY DEPARTMENT  7/7/2022     Oscar Rivero MD  07/07/22 [id number]

## 2022-07-07 NOTE — ED TRIAGE NOTES
Pt was told to present to ER for positive blood cultures  Has had fevers recently, has not had any covid testing  Takes lovenox  Has double lumen CVC    Denies cough, SOB    Hx short gut syndrome, has G tube, severe malnutrition

## 2022-07-07 NOTE — PROGRESS NOTES
This is a recent snapshot of the patient's [facility] medical record.  For current drug dose and complete information and questions, call [phone] or In Basket pool, fv home infusion ([id number])  CSN Number:  [id number]

## 2022-07-07 NOTE — TELEPHONE ENCOUNTER
Spoke with patients wife on phone, she states they will be driving down to [facility] around 9am and will be to the ED around 11/11:30. Reiterated importance of going and asked her to call me if anything comes up.

## 2022-07-07 NOTE — PHARMACY-VANCOMYCIN DOSING SERVICE
Pharmacy Vancomycin Initial Note  Date of Service 2022  Patient's  1970  51 year old, male    Indication: Bacteremia and Sepsis    Current estimated CrCl = Estimated Creatinine Clearance: 130.3 mL/min (based on SCr of 0.77 mg/dL).    Creatinine for last 3 days  2022:  3:15 PM Creatinine 0.73 mg/dL  2022: 12:31 PM Creatinine 0.77 mg/dL    Recent Vancomycin Level(s) for last 3 days  No results found for requested labs within last 72 hours.      Vancomycin IV Administrations (past 72 hours)      No vancomycin orders with administrations in past 72 hours.                Nephrotoxins and other renal medications (From now, onward)    Start     Dose/Rate Route Frequency Ordered Stop    22 0300  vancomycin 1250 mg in 0.9% NaCl 250 mL intermittent infusion 1,250 mg         1,250 mg  over 90 Minutes Intravenous EVERY 12 HOURS 22 1430      22 1410  vancomycin (VANCOCIN) 2,000 mg in sodium chloride 0.9 % 500 mL intermittent infusion         2,000 mg  over 120 Minutes Intravenous ONCE 22 1408            Contrast Orders - past 72 hours (72h ago, onward)    None          InsightRX Prediction of Planned Initial Vancomycin Regimen  Loading dose: 2000 mg at 15:00 2022.  Regimen: 1250 mg IV every 12 hours.  Start time: 14:27 on 2022  Exposure target: AUC24 (range)400-600 mg/L.hr   AUC24,ss: 520 mg/L.hr  Probability of AUC24 > 400: 75 %  Ctrough,ss: 15.5 mg/L  Probability of Ctrough,ss > 20: 31 %  Probability of nephrotoxicity (Lodise CARMELA ): 11 %        Plan:  1. Start vancomycin  2000 mg (25 mg/kg) IV once then 1250 mg Q12H.   2. Vancomycin monitoring method: AUC  3. Vancomycin therapeutic monitoring goal: 400-600 mg*h/L  4. Pharmacy will check vancomycin levels as appropriate in 1-3 Days.    5. Serum creatinine levels will be ordered daily for the first week of therapy and at least twice weekly for subsequent weeks.      Romeo Jones [facility]

## 2022-07-07 NOTE — PROGRESS NOTES
This is a recent snapshot of the patient's [facility] medical record.  For current drug dose and complete information and questions, call [phone] or In Basket pool, fv home infusion ([id number])  CSN Number:  [id number]

## 2022-07-08 ENCOUNTER — HOME INFUSION (PRE-WILLOW HOME INFUSION) (OUTPATIENT)
Dept: PHARMACY | Facility: CLINIC | Age: 52
End: 2022-07-08

## 2022-07-08 ENCOUNTER — APPOINTMENT (OUTPATIENT)
Dept: ULTRASOUND IMAGING | Facility: CLINIC | Age: 52
DRG: 314 | End: 2022-07-08
Payer: COMMERCIAL

## 2022-07-08 ENCOUNTER — APPOINTMENT (OUTPATIENT)
Dept: INTERVENTIONAL RADIOLOGY/VASCULAR | Facility: CLINIC | Age: 52
DRG: 314 | End: 2022-07-08
Attending: NURSE PRACTITIONER
Payer: COMMERCIAL

## 2022-07-08 ENCOUNTER — APPOINTMENT (OUTPATIENT)
Dept: CARDIOLOGY | Facility: CLINIC | Age: 52
DRG: 314 | End: 2022-07-08
Payer: COMMERCIAL

## 2022-07-08 ENCOUNTER — PATIENT OUTREACH (OUTPATIENT)
Dept: CARE COORDINATION | Facility: CLINIC | Age: 52
End: 2022-07-08

## 2022-07-08 LAB
ALBUMIN SERPL BCG-MCNC: 2.9 G/DL (ref 3.5–5.2)
ALBUMIN SERPL BCG-MCNC: 3 G/DL (ref 3.5–5.2)
ALP SERPL-CCNC: 164 U/L (ref 40–129)
ALP SERPL-CCNC: 180 U/L (ref 40–129)
ALT SERPL W P-5'-P-CCNC: 203 U/L (ref 10–50)
ALT SERPL W P-5'-P-CCNC: 228 U/L (ref 10–50)
ANION GAP SERPL CALCULATED.3IONS-SCNC: 6 MMOL/L (ref 7–15)
ANION GAP SERPL CALCULATED.3IONS-SCNC: 9 MMOL/L (ref 7–15)
APAP SERPL-MCNC: <5 UG/ML (ref 10–30)
APTT PPP: 39 SECONDS (ref 22–38)
AST SERPL W P-5'-P-CCNC: 158 U/L (ref 10–50)
AST SERPL W P-5'-P-CCNC: 227 U/L (ref 10–50)
ATRIAL RATE - MUSE: 96 BPM
BACTERIA BLD CULT: ABNORMAL
BACTERIA BLD CULT: ABNORMAL
BASOPHILS # BLD AUTO: 0 10E3/UL (ref 0–0.2)
BASOPHILS NFR BLD AUTO: 0 %
BILIRUB DIRECT SERPL-MCNC: <0.2 MG/DL (ref 0–0.3)
BILIRUB SERPL-MCNC: 0.3 MG/DL
BILIRUB SERPL-MCNC: 0.3 MG/DL
BUN SERPL-MCNC: 10.5 MG/DL (ref 6–20)
BUN SERPL-MCNC: 9.8 MG/DL (ref 6–20)
CALCIUM SERPL-MCNC: 7.7 MG/DL (ref 8.6–10)
CALCIUM SERPL-MCNC: 7.9 MG/DL (ref 8.6–10)
CHLORIDE SERPL-SCNC: 107 MMOL/L (ref 98–107)
CHLORIDE SERPL-SCNC: 109 MMOL/L (ref 98–107)
CREAT SERPL-MCNC: 0.73 MG/DL (ref 0.67–1.17)
CREAT SERPL-MCNC: 0.79 MG/DL (ref 0.67–1.17)
DEPRECATED HCO3 PLAS-SCNC: 25 MMOL/L (ref 22–29)
DEPRECATED HCO3 PLAS-SCNC: 27 MMOL/L (ref 22–29)
DIASTOLIC BLOOD PRESSURE - MUSE: NORMAL MMHG
EOSINOPHIL # BLD AUTO: 0 10E3/UL (ref 0–0.7)
EOSINOPHIL NFR BLD AUTO: 0 %
ERYTHROCYTE [DISTWIDTH] IN BLOOD BY AUTOMATED COUNT: 15 % (ref 10–15)
ERYTHROCYTE [DISTWIDTH] IN BLOOD BY AUTOMATED COUNT: 15.1 % (ref 10–15)
GFR SERPL CREATININE-BSD FRML MDRD: >90 ML/MIN/1.73M2
GFR SERPL CREATININE-BSD FRML MDRD: >90 ML/MIN/1.73M2
GLUCOSE SERPL-MCNC: 100 MG/DL (ref 70–99)
GLUCOSE SERPL-MCNC: 95 MG/DL (ref 70–99)
HAPTOGLOB SERPL-MCNC: 149 MG/DL (ref 32–197)
HBV SURFACE AB SERPL IA-ACNC: 0.75 M[IU]/ML
HCT VFR BLD AUTO: 31.4 % (ref 40–53)
HCT VFR BLD AUTO: 33.7 % (ref 40–53)
HCV AB SERPL QL IA: NONREACTIVE
HGB BLD-MCNC: 10.1 G/DL (ref 13.3–17.7)
HGB BLD-MCNC: 10.6 G/DL (ref 13.3–17.7)
HOLD SPECIMEN: NORMAL
HOLD SPECIMEN: NORMAL
IMM GRANULOCYTES # BLD: 0.1 10E3/UL
IMM GRANULOCYTES NFR BLD: 1 %
INR PPP: 1.42 (ref 0.85–1.15)
INTERPRETATION ECG - MUSE: NORMAL
LACTATE SERPL-SCNC: 1 MMOL/L (ref 0.7–2)
LDH SERPL L TO P-CCNC: 214 U/L (ref 0–250)
LVEF ECHO: NORMAL
LYMPHOCYTES # BLD AUTO: 0.7 10E3/UL (ref 0.8–5.3)
LYMPHOCYTES NFR BLD AUTO: 5 %
MAGNESIUM SERPL-MCNC: 1.8 MG/DL (ref 1.7–2.3)
MCH RBC QN AUTO: 31.1 PG (ref 26.5–33)
MCH RBC QN AUTO: 31.8 PG (ref 26.5–33)
MCHC RBC AUTO-ENTMCNC: 31.5 G/DL (ref 31.5–36.5)
MCHC RBC AUTO-ENTMCNC: 32.2 G/DL (ref 31.5–36.5)
MCV RBC AUTO: 99 FL (ref 78–100)
MCV RBC AUTO: 99 FL (ref 78–100)
MONOCYTES # BLD AUTO: 1.5 10E3/UL (ref 0–1.3)
MONOCYTES NFR BLD AUTO: 10 %
NEUTROPHILS # BLD AUTO: 12.3 10E3/UL (ref 1.6–8.3)
NEUTROPHILS NFR BLD AUTO: 84 %
NRBC # BLD AUTO: 0 10E3/UL
NRBC BLD AUTO-RTO: 0 /100
P AXIS - MUSE: 34 DEGREES
PATH REPORT.COMMENTS IMP SPEC: NORMAL
PATH REPORT.COMMENTS IMP SPEC: NORMAL
PATH REPORT.FINAL DX SPEC: NORMAL
PATH REPORT.MICROSCOPIC SPEC OTHER STN: NORMAL
PATH REPORT.MICROSCOPIC SPEC OTHER STN: NORMAL
PATH REPORT.RELEVANT HX SPEC: NORMAL
PHOSPHATE SERPL-MCNC: 3.5 MG/DL (ref 2.5–4.5)
PLATELET # BLD AUTO: 106 10E3/UL (ref 150–450)
PLATELET # BLD AUTO: 96 10E3/UL (ref 150–450)
POTASSIUM SERPL-SCNC: 3.2 MMOL/L (ref 3.4–5.3)
POTASSIUM SERPL-SCNC: 3.2 MMOL/L (ref 3.4–5.3)
PR INTERVAL - MUSE: 140 MS
PROT SERPL-MCNC: 5.1 G/DL (ref 6.4–8.3)
PROT SERPL-MCNC: 5.2 G/DL (ref 6.4–8.3)
QRS DURATION - MUSE: 92 MS
QT - MUSE: 342 MS
QTC - MUSE: 432 MS
R AXIS - MUSE: -9 DEGREES
RBC # BLD AUTO: 3.18 10E6/UL (ref 4.4–5.9)
RBC # BLD AUTO: 3.41 10E6/UL (ref 4.4–5.9)
SODIUM SERPL-SCNC: 141 MMOL/L (ref 136–145)
SODIUM SERPL-SCNC: 142 MMOL/L (ref 136–145)
SYSTOLIC BLOOD PRESSURE - MUSE: NORMAL MMHG
T AXIS - MUSE: 9 DEGREES
VENTRICULAR RATE- MUSE: 96 BPM
VIT B12 SERPL-MCNC: 481 PG/ML (ref 232–1245)
WBC # BLD AUTO: 12.7 10E3/UL (ref 4–11)
WBC # BLD AUTO: 14.5 10E3/UL (ref 4–11)

## 2022-07-08 PROCEDURE — 36415 COLL VENOUS BLD VENIPUNCTURE: CPT | Performed by: INTERNAL MEDICINE

## 2022-07-08 PROCEDURE — 83735 ASSAY OF MAGNESIUM: CPT | Performed by: STUDENT IN AN ORGANIZED HEALTH CARE EDUCATION/TRAINING PROGRAM

## 2022-07-08 PROCEDURE — 83605 ASSAY OF LACTIC ACID: CPT | Performed by: STUDENT IN AN ORGANIZED HEALTH CARE EDUCATION/TRAINING PROGRAM

## 2022-07-08 PROCEDURE — 93975 VASCULAR STUDY: CPT | Mod: 26 | Performed by: STUDENT IN AN ORGANIZED HEALTH CARE EDUCATION/TRAINING PROGRAM

## 2022-07-08 PROCEDURE — 85027 COMPLETE CBC AUTOMATED: CPT | Performed by: STUDENT IN AN ORGANIZED HEALTH CARE EDUCATION/TRAINING PROGRAM

## 2022-07-08 PROCEDURE — 250N000013 HC RX MED GY IP 250 OP 250 PS 637: Performed by: EMERGENCY MEDICINE

## 2022-07-08 PROCEDURE — 999N000128 HC STATISTIC PERIPHERAL IV START W/O US GUIDANCE

## 2022-07-08 PROCEDURE — 87070 CULTURE OTHR SPECIMN AEROBIC: CPT | Performed by: STUDENT IN AN ORGANIZED HEALTH CARE EDUCATION/TRAINING PROGRAM

## 2022-07-08 PROCEDURE — 999N000127 HC STATISTIC PERIPHERAL IV START W US GUIDANCE

## 2022-07-08 PROCEDURE — 99233 SBSQ HOSP IP/OBS HIGH 50: CPT | Performed by: INTERNAL MEDICINE

## 2022-07-08 PROCEDURE — 87040 BLOOD CULTURE FOR BACTERIA: CPT | Performed by: INTERNAL MEDICINE

## 2022-07-08 PROCEDURE — 36415 COLL VENOUS BLD VENIPUNCTURE: CPT | Performed by: STUDENT IN AN ORGANIZED HEALTH CARE EDUCATION/TRAINING PROGRAM

## 2022-07-08 PROCEDURE — 250N000011 HC RX IP 250 OP 636: Performed by: STUDENT IN AN ORGANIZED HEALTH CARE EDUCATION/TRAINING PROGRAM

## 2022-07-08 PROCEDURE — 85610 PROTHROMBIN TIME: CPT | Performed by: STUDENT IN AN ORGANIZED HEALTH CARE EDUCATION/TRAINING PROGRAM

## 2022-07-08 PROCEDURE — 258N000003 HC RX IP 258 OP 636: Performed by: EMERGENCY MEDICINE

## 2022-07-08 PROCEDURE — 86706 HEP B SURFACE ANTIBODY: CPT | Performed by: STUDENT IN AN ORGANIZED HEALTH CARE EDUCATION/TRAINING PROGRAM

## 2022-07-08 PROCEDURE — 93306 TTE W/DOPPLER COMPLETE: CPT

## 2022-07-08 PROCEDURE — 36589 REMOVAL TUNNELED CV CATH: CPT

## 2022-07-08 PROCEDURE — 250N000011 HC RX IP 250 OP 636

## 2022-07-08 PROCEDURE — 250N000013 HC RX MED GY IP 250 OP 250 PS 637: Performed by: STUDENT IN AN ORGANIZED HEALTH CARE EDUCATION/TRAINING PROGRAM

## 2022-07-08 PROCEDURE — 86803 HEPATITIS C AB TEST: CPT | Performed by: STUDENT IN AN ORGANIZED HEALTH CARE EDUCATION/TRAINING PROGRAM

## 2022-07-08 PROCEDURE — 87040 BLOOD CULTURE FOR BACTERIA: CPT

## 2022-07-08 PROCEDURE — 82248 BILIRUBIN DIRECT: CPT

## 2022-07-08 PROCEDURE — 80143 DRUG ASSAY ACETAMINOPHEN: CPT | Performed by: INTERNAL MEDICINE

## 2022-07-08 PROCEDURE — 76700 US EXAM ABDOM COMPLETE: CPT

## 2022-07-08 PROCEDURE — 99223 1ST HOSP IP/OBS HIGH 75: CPT | Performed by: INTERNAL MEDICINE

## 2022-07-08 PROCEDURE — 87340 HEPATITIS B SURFACE AG IA: CPT | Performed by: STUDENT IN AN ORGANIZED HEALTH CARE EDUCATION/TRAINING PROGRAM

## 2022-07-08 PROCEDURE — 85025 COMPLETE CBC W/AUTO DIFF WBC: CPT | Performed by: STUDENT IN AN ORGANIZED HEALTH CARE EDUCATION/TRAINING PROGRAM

## 2022-07-08 PROCEDURE — 84155 ASSAY OF PROTEIN SERUM: CPT | Performed by: STUDENT IN AN ORGANIZED HEALTH CARE EDUCATION/TRAINING PROGRAM

## 2022-07-08 PROCEDURE — 250N000011 HC RX IP 250 OP 636: Performed by: EMERGENCY MEDICINE

## 2022-07-08 PROCEDURE — 250N000009 HC RX 250: Performed by: RADIOLOGY

## 2022-07-08 PROCEDURE — 85060 BLOOD SMEAR INTERPRETATION: CPT | Performed by: STUDENT IN AN ORGANIZED HEALTH CARE EDUCATION/TRAINING PROGRAM

## 2022-07-08 PROCEDURE — 250N000013 HC RX MED GY IP 250 OP 250 PS 637: Performed by: INTERNAL MEDICINE

## 2022-07-08 PROCEDURE — 36589 REMOVAL TUNNELED CV CATH: CPT | Mod: GC | Performed by: RADIOLOGY

## 2022-07-08 PROCEDURE — 120N000002 HC R&B MED SURG/OB UMMC

## 2022-07-08 PROCEDURE — 36415 COLL VENOUS BLD VENIPUNCTURE: CPT

## 2022-07-08 PROCEDURE — 82607 VITAMIN B-12: CPT | Performed by: STUDENT IN AN ORGANIZED HEALTH CARE EDUCATION/TRAINING PROGRAM

## 2022-07-08 PROCEDURE — 96366 THER/PROPH/DIAG IV INF ADDON: CPT | Performed by: EMERGENCY MEDICINE

## 2022-07-08 PROCEDURE — 82310 ASSAY OF CALCIUM: CPT | Performed by: STUDENT IN AN ORGANIZED HEALTH CARE EDUCATION/TRAINING PROGRAM

## 2022-07-08 PROCEDURE — 85730 THROMBOPLASTIN TIME PARTIAL: CPT | Performed by: STUDENT IN AN ORGANIZED HEALTH CARE EDUCATION/TRAINING PROGRAM

## 2022-07-08 PROCEDURE — 258N000003 HC RX IP 258 OP 636

## 2022-07-08 PROCEDURE — 96376 TX/PRO/DX INJ SAME DRUG ADON: CPT | Performed by: EMERGENCY MEDICINE

## 2022-07-08 PROCEDURE — C9113 INJ PANTOPRAZOLE SODIUM, VIA: HCPCS

## 2022-07-08 PROCEDURE — 93306 TTE W/DOPPLER COMPLETE: CPT | Mod: 26 | Performed by: INTERNAL MEDICINE

## 2022-07-08 PROCEDURE — 84100 ASSAY OF PHOSPHORUS: CPT | Performed by: STUDENT IN AN ORGANIZED HEALTH CARE EDUCATION/TRAINING PROGRAM

## 2022-07-08 RX ORDER — POTASSIUM CHLORIDE 7.45 MG/ML
10 INJECTION INTRAVENOUS
Status: DISPENSED | OUTPATIENT
Start: 2022-07-08 | End: 2022-07-09

## 2022-07-08 RX ORDER — NICOTINE POLACRILEX 4 MG
15-30 LOZENGE BUCCAL
Status: DISCONTINUED | OUTPATIENT
Start: 2022-07-08 | End: 2022-07-13 | Stop reason: HOSPADM

## 2022-07-08 RX ORDER — ENOXAPARIN SODIUM 100 MG/ML
40 INJECTION SUBCUTANEOUS EVERY 24 HOURS
Status: CANCELLED | OUTPATIENT
Start: 2022-07-08

## 2022-07-08 RX ORDER — NICOTINE POLACRILEX 4 MG
15-30 LOZENGE BUCCAL
Status: DISCONTINUED | OUTPATIENT
Start: 2022-07-08 | End: 2022-07-10

## 2022-07-08 RX ORDER — POTASSIUM CHLORIDE 750 MG/1
40 TABLET, EXTENDED RELEASE ORAL ONCE
Status: DISCONTINUED | OUTPATIENT
Start: 2022-07-08 | End: 2022-07-08

## 2022-07-08 RX ORDER — DEXTROSE MONOHYDRATE 25 G/50ML
25-50 INJECTION, SOLUTION INTRAVENOUS
Status: DISCONTINUED | OUTPATIENT
Start: 2022-07-08 | End: 2022-07-13 | Stop reason: HOSPADM

## 2022-07-08 RX ORDER — LIDOCAINE HYDROCHLORIDE 10 MG/ML
1-30 INJECTION, SOLUTION EPIDURAL; INFILTRATION; INTRACAUDAL; PERINEURAL
Status: COMPLETED | OUTPATIENT
Start: 2022-07-08 | End: 2022-07-08

## 2022-07-08 RX ORDER — DEXTROAMPHETAMINE SACCHARATE, AMPHETAMINE ASPARTATE, DEXTROAMPHETAMINE SULFATE AND AMPHETAMINE SULFATE 2.5; 2.5; 2.5; 2.5 MG/1; MG/1; MG/1; MG/1
20 TABLET ORAL DAILY
Status: DISCONTINUED | OUTPATIENT
Start: 2022-07-08 | End: 2022-07-13 | Stop reason: HOSPADM

## 2022-07-08 RX ORDER — POTASSIUM CHLORIDE 7.45 MG/ML
10 INJECTION INTRAVENOUS
Status: DISCONTINUED | OUTPATIENT
Start: 2022-07-08 | End: 2022-07-08

## 2022-07-08 RX ORDER — DEXTROSE MONOHYDRATE 25 G/50ML
25-50 INJECTION, SOLUTION INTRAVENOUS
Status: DISCONTINUED | OUTPATIENT
Start: 2022-07-08 | End: 2022-07-10

## 2022-07-08 RX ORDER — LORAZEPAM 1 MG/1
1 TABLET ORAL DAILY PRN
Status: DISCONTINUED | OUTPATIENT
Start: 2022-07-08 | End: 2022-07-13 | Stop reason: HOSPADM

## 2022-07-08 RX ORDER — DEXTROSE MONOHYDRATE 100 MG/ML
INJECTION, SOLUTION INTRAVENOUS CONTINUOUS PRN
Status: DISCONTINUED | OUTPATIENT
Start: 2022-07-08 | End: 2022-07-13 | Stop reason: HOSPADM

## 2022-07-08 RX ADMIN — VANCOMYCIN HYDROCHLORIDE 1250 MG: 10 INJECTION, POWDER, LYOPHILIZED, FOR SOLUTION INTRAVENOUS at 15:29

## 2022-07-08 RX ADMIN — OXYCODONE HYDROCHLORIDE 10 MG: 5 SOLUTION ORAL at 10:19

## 2022-07-08 RX ADMIN — ACETAMINOPHEN 500 MG: 160 SOLUTION ORAL at 14:09

## 2022-07-08 RX ADMIN — CEFEPIME HYDROCHLORIDE 2 G: 2 INJECTION, POWDER, FOR SOLUTION INTRAVENOUS at 21:07

## 2022-07-08 RX ADMIN — DIPHENHYDRAMINE HYDROCHLORIDE 50 MG: 50 CAPSULE ORAL at 14:53

## 2022-07-08 RX ADMIN — SUCRALFATE 1 G: 1 SUSPENSION ORAL at 10:18

## 2022-07-08 RX ADMIN — CEFEPIME HYDROCHLORIDE 2 G: 2 INJECTION, POWDER, FOR SOLUTION INTRAVENOUS at 14:11

## 2022-07-08 RX ADMIN — OXYCODONE HYDROCHLORIDE 10 MG: 5 SOLUTION ORAL at 17:06

## 2022-07-08 RX ADMIN — DEXTROAMPHETAMINE SACCHARATE, AMPHETAMINE ASPARTATE, DEXTROAMPHETAMINE SULFATE AND AMPHETAMINE SULFATE 20 MG: 2.5; 2.5; 2.5; 2.5 TABLET ORAL at 12:33

## 2022-07-08 RX ADMIN — PANTOPRAZOLE SODIUM 40 MG: 40 INJECTION, POWDER, FOR SOLUTION INTRAVENOUS at 10:18

## 2022-07-08 RX ADMIN — VANCOMYCIN HYDROCHLORIDE 1250 MG: 10 INJECTION, POWDER, LYOPHILIZED, FOR SOLUTION INTRAVENOUS at 03:01

## 2022-07-08 RX ADMIN — LORAZEPAM 1 MG: 0.5 TABLET ORAL at 22:16

## 2022-07-08 RX ADMIN — SODIUM CHLORIDE, POTASSIUM CHLORIDE, SODIUM LACTATE AND CALCIUM CHLORIDE 1000 ML: 600; 310; 30; 20 INJECTION, SOLUTION INTRAVENOUS at 21:40

## 2022-07-08 RX ADMIN — SUCRALFATE 1 G: 1 SUSPENSION ORAL at 21:23

## 2022-07-08 RX ADMIN — OXYCODONE HYDROCHLORIDE 10 MG: 5 SOLUTION ORAL at 23:43

## 2022-07-08 RX ADMIN — ACETAMINOPHEN 500 MG: 160 SOLUTION ORAL at 22:16

## 2022-07-08 RX ADMIN — DIPHENHYDRAMINE HYDROCHLORIDE 50 MG: 50 CAPSULE ORAL at 01:57

## 2022-07-08 RX ADMIN — LIDOCAINE HYDROCHLORIDE 5 ML: 10 INJECTION, SOLUTION EPIDURAL; INFILTRATION; INTRACAUDAL; PERINEURAL at 17:27

## 2022-07-08 RX ADMIN — MEROPENEM 1 G: 1 INJECTION, POWDER, FOR SOLUTION INTRAVENOUS at 05:53

## 2022-07-08 RX ADMIN — FENTANYL TRANSDERMAL 1 PATCH: 25 PATCH, EXTENDED RELEASE TRANSDERMAL at 10:19

## 2022-07-08 RX ADMIN — POTASSIUM CHLORIDE 10 MEQ: 7.46 INJECTION, SOLUTION INTRAVENOUS at 21:40

## 2022-07-08 RX ADMIN — ONDANSETRON 4 MG: 2 INJECTION INTRAMUSCULAR; INTRAVENOUS at 17:06

## 2022-07-08 ASSESSMENT — ACTIVITIES OF DAILY LIVING (ADL)
ADLS_ACUITY_SCORE: 37
ADLS_ACUITY_SCORE: 39
ADLS_ACUITY_SCORE: 37
ADLS_ACUITY_SCORE: 39
ADLS_ACUITY_SCORE: 39
ADLS_ACUITY_SCORE: 37
ADLS_ACUITY_SCORE: 39
ADLS_ACUITY_SCORE: 37
ADLS_ACUITY_SCORE: 39
ADLS_ACUITY_SCORE: 39

## 2022-07-08 NOTE — PROVIDER NOTIFICATION
Paged the team , Critical lab result from Red lumen done yesterday at 1323 1st day of incubation was Gram (-) vacilli and Gram (+) vacilli.

## 2022-07-08 NOTE — PROGRESS NOTES
Dez 2 resident text paged: Pt refused IV potassium replacement (complaints of burning), refused slower rate & saline administration. Pt also refused oral replacement

## 2022-07-08 NOTE — TELEPHONE ENCOUNTER
The Pt is currently in the hospital with Sepsis. Will continue to monitor chart.     Pinky Carbajal RN   [facility]

## 2022-07-08 NOTE — PLAN OF CARE
Problem: Parenteral Nutrition  Goal: Effective Intravenous Nutrition Therapy Delivery  Intervention: Optimize Intravenous Nutrition Delivery  Flowsheets (Taken 7/8/2022 1614)  Nutrition Support Management:   parenteral nutrition composition adjusted   parenteral nutrition initiated  Intervention: Optimize Nutrition, Fluid and Electrolyte Intake  Flowsheets (Taken 7/8/2022 1614)  Fluid/Electrolyte Management: other (see comments)   Goal Outcome Evaluation: ongoing

## 2022-07-08 NOTE — PROGRESS NOTES
Dez 2 resident text paged: Lab reported growth of lactococcus lactis from blood cultures drawn yesterday

## 2022-07-08 NOTE — PROGRESS NOTES
PIV not able to flush, painful for pt. IV pulled by writer. Pt refusing to have IV replaced until able to go outside to smoke a cigarette.

## 2022-07-08 NOTE — PROGRESS NOTES
Patient Name: Parker Acevedo  Medical Record Number: [id number]  Today's Date: 7/8/2022    Procedure: Tunneled Central Line Removal  Proceduralist: Dr. Shell & Dr. Baldwin  Pathology present: N/A    Procedure Start: 1725  Procedure end: 1737  Sedation medications administered: N/A     Report given to: ED RN  : N/A    Other Notes: Pt arrived to IR room 2 from ED. Consent reviewed. Pt denies any questions or concerns regarding procedure. Pt positioned supine and monitored per protocol. Pt tolerated procedure without any noted complications. Pt transferred back to ED.

## 2022-07-08 NOTE — PLAN OF CARE
Assumed cares at [phone]     Status: Gram (-) Bacteremia  Neuro:  AOX4  GI/: G-tube for venting  Resp: Minimal crackles, diminished lung sounds: XR done bibasilar pneumonia  Mobility: SBA, else Independent  Cardiac: WNL  Lines/Drains: G-tube for venting, PIV line at lt arm saline locked, Cm double lumen  Pain: 5-6 pain headache, Tylenol oral given PRN, Oxycodone PRN  Skin: Scattered bruises on abdomen     VS:/65 (BP Location: Right arm, Patient Position: Left side)   Pulse 76   Temp 100.1  F (37.8  C) (Oral)   Resp 18   SpO2 95%        Plan of Care:   -Fall Prec  -Up with Assist PRN  -NPO  -ID Consulted

## 2022-07-08 NOTE — PROGRESS NOTES
[facility]    Progress Note - Medicine Service, [facility] TEAM 2       Date of Admission:  7/7/2022    Assessment & Plan            Parker Acevedo is a 51 year old male admitted on 7/7/2022. He presents with positive blood cultures, has a history of CLBSI, cardiomyopathy, chronic abdominal pain, GERD, RUE DVT, dysphagia, hyperlipidemia, malnutrition, TPN, short gut syndrome s/p baratric surgery with a chronic G tube vent, and is admitted for bacteremia and concern for encephalitis. S/p 3L NS, meropenem x1 day and vancomycin x1 daywith 50 mg benadryl pretreatment since admission. CXR in ED was concerning for bibasilar pneumonia.      Admitted to inpatient hospital service for ID workup and further treatment.    Changes today:  - Replace peripheral IV  - ID following   - continue vancomycin   - start cefepime 2g Q8hr  - monitoring cultures, diet/nutrition  - speech consult     # Severe sepsis, improving  # Polymicrobial bacteremia  Likely secondary to recurrent CLBSI (Cm).    - ID following  - Vancomycin and meropenum started (7/7- )              - pretreat with benadryl for vanco infusion due to previous infusion reaction  - meropenem changed to cefepime 2g Q8 hrs (7/8- )  - 7/8 TTE shows no obvious valvular vegetations or abnormalities. Reduced EF 45-50%.   - PTA adderall and ativan reordered per patient request  - daily CMP  - daily CBC w/ diff  - Cm line removed with IR  - Continue IV NS maintenance fluids       Cultures:  - 7/8, 79  cultures pending  - 7/7 UA shows mucous urine with ketones (10), albumin (10), urobilinogen (4), and small amount of blood  - 7/7 cultures positive for G+ bacilli, G+ cocci in pair and chains, G- bacilli, awaiting speciation  - 7/5 outside cultures positive for G+ bacilli, G+ cocci in pair and chains, G- bacilli     # Encephalopathy, secondary to sepsis  Secondary to sepsis, resolved  - Infectious work-up as above     #  Pancytopenia  Improving with sepsis treatment     #Sevre malnutrition secondary to chronic disease, short gut, gastroparesis  TPN dependent  History of short gut syndrome and several GI surgeries including bariatric surgery, appendectomy, and cholecystectomy. Patient receives TPN initially via G tube feed (failed, now only for venting) now via Cm. Additional history of dysphagia. Oral intake for pleasure per wife. TPN stopped 7/5 when patient became febrile. This is based on previous experience with central line infections.   - Nutrition consulted  - Planning for PPN during hospital course  - IV NS for hydration     #GERD  #Chronic abdominal pain  Patient has history of peptic ulcers and GERD. Wife denies any knowledge of recent blood in the stool or recent vomiting. Patient on Lovenox therapy for recent DVT increasing his bleeding risk. Elevated LFTs and alk phos (7/8) concerning for hepatic ischemia but this is unlikely due to appropriate doppler on US and stable LFT trend on repeat CMP.     - Reintroduce PTA Lovenox dosing  - Trend LFTs and alk phos  - Continue pantoprazole as above  - Continue GI Cocktail     Imaging:  - 7/8 Abd US shows hepatomegaly and nonspecific periportal echogenicity consistent with inflammation vs congestion.        Diet: NPO for Medical/Clinical Reasons Except for: Meds  parenteral nutrition - ADULT compounded formula    DVT Prophylaxis: Enoxaparin (Lovenox) SQ  Sanchez Catheter: Not present  Fluids: IV NS  Central Lines: None  Cardiac Monitoring: None  Code Status: Full Code      Disposition Plan     Expected Discharge Date: 07/15/2022        Discharge Comments: Pending resolution of sepsis/bacteremia, multiple prior episodes with likely discharge home vs TCU pending PT/OT evaluation when stable        The patient's care was discussed with the patient, nurse, and care coordinator    Tion Chisholm MD  Medicine Service, [facility] TEAM [address] [place] [address]  Center  Securely message with the Vocera Web Console (learn more here)  Text page via [facility] Paging/Directory   Please see signed in provider for up to date coverage information        ______________________________________________________________________    Interval History   No acute events.  Feeling better.     4 point ROS otherwise negative    Data reviewed today: I reviewed all medications, new labs and imaging results over the last 24 hours. I personally reviewed the head CT image(s) showing no acute findings or concerning enhancement.    Physical Exam   Vital Signs: Temp: 97.9  F (36.6  C) Temp src: Oral BP: 114/78 Pulse: 80   Resp: 18 SpO2: 99 % O2 Device: None (Room air)    Weight: 0 lbs 0 oz  General: AAOx3, NAD  HEENT: NC/AT, MMM, oropharynx clear, anicteric sclera, conjunctiva normal  CV: RRR, normal S1S2, no murmur, clicks, rubs appreciated  Resp: Non-labored respirations, good air movement, no wheezes, rhonchi  Abd: Soft, non-distended, slightly tender to palpation in RUQ  Extremities: wwp,  no pedal edema  Skin: Warm and damp, no obvious rashes or lesions aside from baseline abdominal bruising from Lovenox injection  Neuro: A/Ox3, No lateralizing symptoms or focal neurologic deficits  Psych: Appropriate mood    Data   Recent Labs   Lab 07/09/22  0601 07/08/22  1858 07/08/22  1049 07/08/22  0552 07/08/22  0459 07/07/22  2213   WBC 11.7*  --  12.7* 14.5*  --  8.4   HGB 10.2*  --  10.1* 10.6*  --  10.5*   MCV 97  --  99 99  --  96   PLT 97*  --  96* 106*  --  99*   INR 1.18*  --  1.42*  --   --  1.30*    142  --   --  141  --    POTASSIUM 3.5 3.2*  --   --  3.2*  --    CHLORIDE 110* 109*  --   --  107  --    CO2 25 27  --   --  25  --    BUN 10.1 9.8  --   --  10.5  --    CR 0.64* 0.73  --   --  0.79  --    ANIONGAP 7 6*  --   --  9  --    SILAS 7.9* 7.9*  --   --  7.7*  --    GLC 85 100*  --   --  95  --    ALBUMIN 2.7*  --  3.0*  --  2.9*  --    PROTTOTAL 5.1*  --  5.2*  --  5.1*  --    BILITOTAL  0.3  --  0.3  --  0.3  --    ALKPHOS 133*  --  164*  --  180*  --    *  --  203*  --  228*  --    AST 62*  --  158*  --  227*  --

## 2022-07-08 NOTE — PROGRESS NOTES
Physician Attestation   I, Tino Chisholm MD, was present with the medical/JACQUI student who participated in the service and in the documentation of the note.  I have verified the history and personally performed the physical exam and medical decision making.  I agree with the assessment and plan of care as documented in the note.      I personally reviewed vital signs, medications, labs and imaging.    I discussed pt's care with bedside RN, case management/social work today.  I personally reviewed labs, medications and past 24 hr notes.  Assessment/Plan/Diagnoses: plan/dx as above, which contains my edits and reflects our joint medical decision-making.       Tino Chisholm MD  Date of Service (when I saw the patient): 07/08/22    [facility]    Progress Note - Medicine Service, [facility] TEAM 2       Date of Admission:  7/7/2022    Assessment & Plan            Parker Acevedo is a 51 year old male admitted on 7/7/2022. He presents with positive blood cultures, has a history of CLBSI, cardiomyopathy, chronic abdominal pain, GERD, RUE DVT, dysphagia, hyperlipidemia, malnutrition, TPN, short gut syndrome s/p baratric surgery with a chronic G tube vent, and is admitted for bacteremia and concern for encephalitis. S/p 3L NS, meropenem x1 day and vancomycin x1 daywith 50 mg benadryl pretreatment since admission. CXR in ED was concerning for bibasilar pneumonia.      Admitted to inpatient hospital service for ID workup and further treatment.    Changes today:  - ID following   - continue vancomycin   - discontinue meropenem   - start cefepime 2g Q8hr  - IR consulted for Cm line removal scheduled 7/8  - Repeat blood cultures positive for G- bacilli, G+ bacilli, G+ cocci in pairs and chains, awaiting speciation  - 7/7 head CT shows no acute findings or concerning enhancement  - 7/8 Abd US shows hepatomegaly and nonspecific periportal echogenicity consistent with inflammation vs  congestion.   - 7/8 TTE shows no obvious valvular vegetations or abnormalities and reduced EF 45-50%.   - Trend LFTs and alk phos  - Reintroduced PTA Lovenox dose       # Polymicrobial bacteremia  There is high concern for bacteremia blood cultures positive for enterobacter, G+ cocci in pairs and chains, and bacillus cereus. Given history of CLBSI, wife's confirmation of similar episodes, and positive blood cultures, bacteremia secondary to Cm infection is most likely. Considering CXR consistent with atelectasis vs pneumonia, history of dysphagia, and increased inflammatory markers, pneumonia leading to bacteremia is also possible. Repeat cultures on admission consistent with outside culture growth.   - ID following  - Vancomycin and meropenum started (7/7- )              - pretreat with benadryl for vanco infusion due to previous infusion reaction  - discontinue meropenem  - start cefepime 2g Q8 hrs (7/8- )  - WBC since admit 5.2 > 3.2 > 8.4  - 7/8 TTE shows no obvious valvular vegetations or abnormalities. Reduced EF 45-50%.   - PTA adderall and ativan reordered per patient request  - daily CMP  - daily CBC w/ diff    Cultures:  - 7/8 cultures pending  - 7/7 UA shows mucous urine with ketones (10), albumin (10), urobilinogen (4), and small amount of blood  - 7/7 cultures positive for G+ bacilli, G+ cocci in pair and chains, G- bacilli, awaiting speciation  - 7/5 outside cultures positive for G+ bacilli, G+ cocci in pair and chains, G- bacilli     #Sepsis  Patient presented to ED with positive blood cultures as listed above. He has been afebrile in the hospital but wife reports several days of fever to 101.7 at home. Patient is tachycardic to 102 with BP ranging 106-135 systolic, 51-97 diastolic. On exam patient was obtunded and disoriented to time and self. He was minimally responsive to questions after physical stimulation. He is oriented to place but disoriented to self and time. Wife states increased  fatigue and sleep needs are typical of previous bacteremia episodes but notes AMS has gotten significantly worse since yesterday. Patient's temperature is increased to 100 today (7/8) but is otherwise vitally stable. Mental status is significantly improved from admission.   - ID following  - Continue Abx with changes and infection workup as seen above  - Cm line removal scheduled with IR  - Continue IV NS maintenance fluids     # Encephalopathy, unspecified  Patient is disoriented with AMS as described above. There is concern for encephalitis due to patient disorientation, lack of response to occular neural exam, photophobia, and general obtunded state with positive blood cultures. Negative head CT makes this less likely. More likely AMS secondary to increased work from septic bacteremic infection. 7/8 mental status is significantly improved. Now alert and oriented x3.   - 7/7 Head CT with and without contrast shows no acute findings or concerning enhancement  - 7/7 TSH and B12 wnl  - Infectious work-up as above     #Pneumonia  Patient presented with cough on exam that is new per wife. Denies shortness of breath. Crackles heard in lower lobes but respiratory exam was limited by shallow breaths. CXR consistent with atelectasis vs pneumonia. History of dysphagia and visible drooling on exam concerning for aspiration pneumonia. Currently sating  on room air. Procalcitonin 0.57, elevated. Patient denies SOB.   - ID following  - Abx changes as above  - NPO except for meds  - Continuous oxygen monitoring      # Pancytopenia  Patient with leukopenia, thrombocytopenia and anemia as above. Likely marrow suppression in setting of acute sepsis though superimposed viral infection could be considered. Labs and exam not consistent with DIC or TTP. HIT considered but less likely in setting of infection with intravascular devices.   - Hematology labs since admit stable  - Reticulocyte count wnl  - Blood smear pending  -  May consider hematology consult if persistent      #TPN  History of short gut syndrome and several GI surgeries including bariatric surgery, appendectomy, and cholecystectomy. Patient receives TPN initially via G tube feed (failed, now only for venting) now via Cm. Additional history of dysphagia. Oral intake for pleasure per wife. TPN stopped 7/5 when patient became febrile. This is based on previous experience with central line infections.   - Nutrition consulted  - Planning for PPN during hospital course  - IV NS for hydration     #GERD  #Chronic abdominal pain  Patient has history of peptic ulcers and GERD. Wife denies any knowledge of recent blood in the stool or recent vomiting. Patient on Lovenox therapy for recent DVT increasing his bleeding risk. Elevated LFTs and alk phos (7/8) concerning for hepatic ischemia but this is unlikely due to appropriate doppler on US and stable LFT trend on repeat CMP.     - Reintroduce PTA Lovenox dosing  - Trend LFTs and alk phos  - Continue pantoprazole as above  - Continue GI Cocktail     Imaging:  - 7/8 Abd US shows hepatomegaly and nonspecific periportal echogenicity consistent with inflammation vs congestion.     # C/f GI Bleed   Patient with hemoglobin baseline around 11-12, admitted with Hgb 9-10. Without recent use of NSAIDs or history of esophageal varices per review of EGD. 2017 US Abdomen without evidence of liver cirrhosis. Recent history of red colored emesis per wife. Hematology labs stable since admit. No evidence of occult blood loss. 7/8 Abd US consistent with congestive changes.  - NPO  - 2 PIVs  - Continue IV Pantoprazole  - Continue PRN nausea medications  - Continue daily CBC       Diet: NPO for Medical/Clinical Reasons Except for: Meds    DVT Prophylaxis: Enoxaparin (Lovenox) SQ  Sanchez Catheter: Not present  Fluids: IV NS  Central Lines: PRESENT     Cardiac Monitoring: None  Code Status: Full Code      Disposition Plan      Expected Discharge Date:  07/15/2022        Discharge Comments: Pending resolution of sepsis/bacteremia, multiple prior episodes with likely discharge home vs TCU pending PT/OT evaluation when stable        The patient's care was discussed with the Attending Physician, Dr. Tino Chisholm.    Mairlou Mcgregor MS3  Medical Student  Medicine Service, [facility] TEAM 2  [facility]  Securely message with the Vocera Web Console (learn more here)  Text page via [facility] Paging/Directory   Please see signed in provider for up to date coverage information        ______________________________________________________________________    Interval History   Plan of care discussed with patient and wife, Rose. Patient is significantly improved from yesterday. Now alert and oriented x3, answering questions, and following commands. Acknowledges nausea and chills. Denies chest pain, SOB, diarrhea, constipation, or vomiting. Patient requested order for PTA adderall and ativan from attending physician.     4 point ROS otherwise negative    Data reviewed today: I reviewed all medications, new labs and imaging results over the last 24 hours. I personally reviewed the head CT image(s) showing no acute findings or concerning enhancement.    Physical Exam   Vital Signs: Temp: 100.1  F (37.8  C) Temp src: Oral BP: 109/65 Pulse: 76   Resp: 18 SpO2: 95 % O2 Device: None (Room air)    Weight: 0 lbs 0 oz  General: AAOx3, NAD  HEENT: NC/AT, MMM, oropharynx clear, anicteric sclera, conjunctiva normal  CV: RRR, normal S1S2, no murmur, clicks, rubs appreciated  Resp: Non-labored respirations, good air movement, no wheezes, rhonchi  Abd: Soft, non-distended, slightly tender to palpation in RUQ  Extremities: wwp,  no pedal edema  Skin: Warm and damp, no obvious rashes or lesions aside from baseline abdominal bruising from Lovenox injection  Neuro: A/Ox3, No lateralizing symptoms or focal neurologic deficits  Psych: Appropriate mood    Data    Recent Labs   Lab 07/08/22  1049 07/08/22  0552 07/08/22  0459 07/07/22  2213 07/07/22  1852 07/07/22  1231 07/05/22  1515   WBC 12.7* 14.5*  --  8.4   < > 5.2 6.6   HGB 10.1* 10.6*  --  10.5*   < > 9.9* 10.1*   MCV 99 99  --  96   < > 98 99   PLT 96* 106*  --  99*   < > 114* 105*   INR 1.42*  --   --  1.30*  --   --   --    NA  --   --  141  --   --  134* 138   POTASSIUM  --   --  3.2*  --   --  3.4 3.3*   CHLORIDE  --   --  107  --   --  101 103   CO2  --   --  25  --   --  26 26   BUN  --   --  10.5  --   --  11.9 9.1   CR  --   --  0.79  --   --  0.77 0.73   ANIONGAP  --   --  9  --   --  7 9   SILAS  --   --  7.7*  --   --  8.3* 8.5*   GLC  --   --  95  --   --  83 94   ALBUMIN 3.0*  --  2.9*  --   --  3.5 3.5   PROTTOTAL 5.2*  --  5.1*  --   --  5.7* 5.8*   BILITOTAL 0.3  --  0.3  --   --  0.2 0.5  0.5   ALKPHOS 164*  --  180*  --   --  52 53   *  --  228*  --   --  34 27   *  --  227*  --   --  33 26    < > = values in this interval not displayed.

## 2022-07-08 NOTE — PROCEDURES
[facility]    Procedure: IR Procedure Note    Date/Time: 7/8/2022 5:45 PM  Performed by: Demetrius Shell MD  Authorized by: Deo Baldwin MD   IR Fellow Physician: Deo Baldwin      UNIVERSAL PROTOCOL   Site Marked: NA  Prior Images Obtained and Reviewed:  Yes  Required items: Required blood products, implants, devices and special equipment available    Patient identity confirmed:  Verbally with patient, arm band, provided demographic data and hospital-assigned identification number  Patient was reevaluated immediately before administering moderate or deep sedation or anesthesia  Confirmation Checklist:  Patient's identity using two indicators, relevant allergies, procedure was appropriate and matched the consent or emergent situation and correct equipment/implants were available  Time out: Immediately prior to the procedure a time out was called    Universal Protocol: the Joint Commission Universal Protocol was followed    Preparation: Patient was prepped and draped in usual sterile fashion       ANESTHESIA    Anesthesia: Local infiltration  Local Anesthetic:  Lidocaine 1% without epinephrine      SEDATION    Patient Sedated: No    See dictated procedure note for full details.  Findings: RIJ tunnelled line removal.     Specimens: none    Complications: None    Condition: Stable    Plan: RIJ tunnelled line removal. F/u tip cx results.       PROCEDURE  Describe Procedure: RIJ tunnelled line removal.   Patient Tolerance:  Patient tolerated the procedure well with no immediate complications  Length of time physician/provider present for 1:1 monitoring during sedation: 0

## 2022-07-08 NOTE — PROGRESS NOTES
This is a recent snapshot of the patient's [facility] medical record.  For current drug dose and complete information and questions, call [phone] or In [facility] pool, [facility] home infusion ([id number])  CSN Number:  [id number]

## 2022-07-08 NOTE — CONSULTS
[facility] INITIAL CONSULT     Patient: Parker Acevedo   Date of birth 1970, Medical record number [id number]  Date of Visit: 07/07/2022  Date of Admission: 7/7/2022  Consult Requester: Tino Chisholm MD          Assessment and Recommendations:     ASSESSMENT:  1. CLABSI, polymicrobial with Enterobacter, Strep species, Bacillus so far  2. Sepsis, encephalopathy  3. Possible aspiration pneumonia, high risk given AMS on presentation  4. 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 h/o recurrent and frequent CLABSI  6. h/o R subcalvian vein thrombus  7. Allergies listed to penicillins, TMP-SMX, doxycycline, vancomycin (tolerates slow infusion and premedication)    RECOMMENDATIONS:  1. Change meropenem to cefepime, polymicrobial bacteremia (covers Enterobacter and Strep)  2. Continue vancomycin, polymicrobial bacteremia (covers Bacillus)  3. Follow up blood cultures, will adjust antibiotics as appropriate  4. IR consult for line removal  5. Daily surveillance blood cultures  6. Do not replace central access until negative blood cultures X72 hours  7. TTE      DISCUSSION: 50 yo M with a h/o prior bariatric surgery on chronic TPN c/b short-gut syndrome and frequent hospitalizations for recurrent CLABSI that presented to ED on 7/7/22 with fevers and concern for a new line (R Cm) infection. Blood cultures were repeated on arrival to ED. Patient arrived incoherent and disoriented though afebrile and hemodynamically stable. No leukocytosis, CRP elevated. He was started on empiric antibiotics, including vancomycin and meropenem. Blood cultures from 7/5 and 7/7 polymicrobial; identification and susceptibilities still pending, but with a predominance of Enterobacter, Strep species and Bacillus. Source of bacteremia likely new CLABSI. Continue broad-spectrum antibiotics while we await for cultures to mature (further identification of organisms and susceptibilities), though  "would change meropenem to cefepime for more targeted coverage of Enterobacter. Vancomycin will cover Bacilllus and other organisms that have implicated in recent line infections. Prior lines infections have included CoNS, S bovis, anaerobes, Rothia, Candida. Most recent CLABSI have included Staph epidermidis and CoNS. He should have his line removed as soon as able, and should not replace central access until blood cultures have cleared (await 72 hours from first negative culture). Recommend obtaining TTE though may not need to progress to RONEL assuming cultures clear with line removal. Concern for aspiration given chest findings, new cough. Suspect current antibiotic regimen would cover well, but if respiratory complaints progress would add anerobic coverage; monitor for now.        ID will continue to follow. Dr. Gustafson ([phone]) will be covering the weekend, and Dr. Horne ([phone]) will be taking over the service starting on Monday.  Call anytime with questions or concerns.    Prakash Alanis MD  [phone]  ________________________________________________________________    Consult Question: \"Patient with multiple admissions for multidrug resistant bacteremia admitted again for positive culture. Please advise infection management.\"  Admission Diagnosis: Gram-negative bacteremia [R78.81]         History of Present Illness:     Parker Acevedo is a 50 yo M with a h/o prior bariatric surgery on chronic TPN c/b short-gut syndrome and frequent hospitalizations for recurrent CLABSI (see summary below) that presented to hospital on 7/7/22 with fevers and concern for a new line (R Cm) infection. Began spiking fevers up to 102 on 7/5, and blood cultures were drawn by home infusion nurse. He has noted a little redness around his Cm insertion site. His wife has been in contact with ID clinic via [employer], who advised him to come to ED for further evaluation. Blood cultures were repeated on arrival to ED. Patient " arrived incoherent and disoriented though afebrile and hemodynamically stable. No leukocytosis, CRP elevated. He was started on IV fluids and empiric antibiotics, including vancomycin and meropenem. Blood cultures from 7/5 and 7/7 polymicrobial; identification and susceptibilities still pending, but with a predominance of Enterobacter, Strep species and Bacillus (see below). ID consulted for assistance managing CLABSI.    Summary of Recent Hospitalizations:  5/2022- MSSE and CoNS  1/2022 - CoNS x 2 species  3/2021 - Streptococcus bovis  7/2020 - MRSE bactermia secondary to central venous catheter; repeat dual-lumen Cm replaced  7/2020 - treated for Corynebacterium infection with Vancomycin  11/4/19 - Corneybacterium and Peptostreptococcus which were deemed contaminants.  10/4/19 - Strep, Staph, and Granulicatella adiacens bactermia; Cm replaced on 10/1  5/16/19 - blood draw from PICC grew Rothia mucilaginosa  1/9/19 - Candidemia sepsis due to PICC line infection.    Recent Microbiology:  7/7 Blood   GNR, GPC p/c  7/5 Blood line  Enterobacter cloacae, Bacillus cereus, GPC (Strep species by verigene)   Blood peripheral GPC     Current antibiotics:  Vancomycin: 7/7-  Meropenem: 7/7-         Review of Systems:     CONSTITUTIONAL:  Fevers, as above  EYES: negative for icterus  ENT:  negative for hearing loss, tinnitus and sore throat  RESPIRATORY:  + cough  CARDIOVASCULAR:  negative for chest pain, dyspnea  GASTROINTESTINAL:  + nausea, negative, diarrhea and constipation  GENITOURINARY:  negative for dysuria  HEME:  No easy bruising  INTEGUMENT:  negative for rash and pruritus  NEURO:  Negative for headache         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)     mild EF ~45% on rest 2/13/17, improves with stressing     Chronic abdominal pain      CLABSI (central line-associated  bloodstream infection)     recurrent     Complication of anesthesia      Difficulty swallowing      Gastric ulcer, unspecified as acute or chronic, without mention of hemorrhage, perforation, or obstruction      Gastro-oesophageal reflux disease      Head injury      Hiatal hernia      Other bladder disorder      Other chronic pain      PONV (postoperative nausea and vomiting)      Severe malnutrition (H)     TPN     Short gut syndrome      Tobacco abuse           Past Surgical History:     Past Surgical History:   Procedure Laterality Date     AMPUTATION       APPENDECTOMY       BACK SURGERY  11/3/2014    curve in the spine     BIOPSY LYMPH NODE CERVICAL N/A 2/20/2015    Procedure: BIOPSY LYMPH NODE CERVICAL;  Surgeon: Baron Scanlon MD;  [facility]     CHOLECYSTECTOMY       COLONOSCOPY N/A 7/14/2021    Procedure: COLONOSCOPY;  Surgeon: Jimbo Estrada MD;  Location: [facility] OR     COLONOSCOPY N/A 4/13/2022    Procedure: COLONOSCOPY;  Surgeon: Jimbo Estrada MD;  Location: [facility] OR     DISCECTOMY, FUSION CERVICAL ANTERIOR ONE LEVEL, COMBINED N/A 2/15/2017    Procedure: COMBINED DISCECTOMY, FUSION CERVICAL ANTERIOR ONE LEVEL;  Surgeon: Darren Campos MD;  [facility]     ENDOSCOPIC INSERTION TUBE GASTROSTOMY  9/9/2013    Procedure: ENDOSCOPIC INSERTION TUBE GASTROSTOMY;;  Surgeon: Francis Vyas MD;  [facility]     ENDOSCOPIC ULTRASOUND UPPER GASTROINTESTINAL TRACT (GI)  4/29/2011    Procedure:ENDOSCOPIC ULTRASOUND UPPER GASTROINTESTINAL TRACT (GI); Both Procedures done Conjointly; Surgeon:NEREIDA HOUSER; [facility]     ENDOSCOPIC ULTRASOUND UPPER GASTROINTESTINAL TRACT (GI)  9/9/2013    Procedure: ENDOSCOPIC ULTRASOUND UPPER GASTROINTESTINAL TRACT (GI);  Endoscopic Ultrasound Guide Gastrostomy Tube Placement  C-arm;  Surgeon: Noe Lizarraga MD;  [facility]     ENDOSCOPIC ULTRASOUND UPPER GASTROINTESTINAL TRACT (GI) N/A 2/24/2021    Procedure: ENDOSCOPIC  ULTRASOUND, ESOPHAGOSCOPY / UPPER GASTROINTESTINAL TRACT (GI), esophagastrogastroduodenoscopy;  Surgeon: Berny Bach MD;  Location:  OR     ENDOSCOPY  03/25/11    EGD, [facility]     ENDOSCOPY  08/04/09    Upper Endoscopy, [facility]     ENDOSCOPY  01/05/09    Upper Endoscopy, [facility]     ESOPHAGOSCOPY, GASTROSCOPY, DUODENOSCOPY (EGD), COMBINED  4/20/2011    Procedure:COMBINED ESOPHAGOSCOPY, GASTROSCOPY, DUODENOSCOPY (EGD); Surgeon:BLU VYAS; [facility]     ESOPHAGOSCOPY, GASTROSCOPY, DUODENOSCOPY (EGD), COMBINED  6/15/2011    Procedure:COMBINED ESOPHAGOSCOPY, GASTROSCOPY, DUODENOSCOPY (EGD); Surgeon:BLU VYAS; [facility]     ESOPHAGOSCOPY, GASTROSCOPY, DUODENOSCOPY (EGD), COMBINED  6/12/2013    Procedure: COMBINED ESOPHAGOSCOPY, GASTROSCOPY, DUODENOSCOPY (EGD);;  Surgeon: Blu Vyas MD;  [facility]     ESOPHAGOSCOPY, GASTROSCOPY, DUODENOSCOPY (EGD), COMBINED  11/22/2013    Procedure: COMBINED ESOPHAGOSCOPY, GASTROSCOPY, DUODENOSCOPY (EGD);;  Surgeon: Blu Vyas MD;  Location:  OR     ESOPHAGOSCOPY, GASTROSCOPY, DUODENOSCOPY (EGD), COMBINED  4/30/2014    Procedure: COMBINED ESOPHAGOSCOPY, GASTROSCOPY, DUODENOSCOPY (EGD);  Surgeon: Blu Vyas MD;  [facility]     ESOPHAGOSCOPY, GASTROSCOPY, DUODENOSCOPY (EGD), COMBINED N/A 2/20/2015    Procedure: COMBINED ESOPHAGOSCOPY, GASTROSCOPY, DUODENOSCOPY (EGD), BIOPSY SINGLE OR MULTIPLE;  Surgeon: Baron Scanlon MD;  Location:  OR     ESOPHAGOSCOPY, GASTROSCOPY, DUODENOSCOPY (EGD), COMBINED N/A 9/30/2015    Procedure: COMBINED ESOPHAGOSCOPY, GASTROSCOPY, DUODENOSCOPY (EGD);  Surgeon: Blu Vyas MD;  [facility]     ESOPHAGOSCOPY, GASTROSCOPY, DUODENOSCOPY (EGD), COMBINED N/A 10/3/2019    Procedure: Upper Endoscopy;  Surgeon: Clif Morrow MD;  [facility]     GASTRECTOMY  6/22/2012    Procedure: GASTRECTOMY;  Open Approach,  Excise Ulcers,Partial Gastrectomy, Esophagojejunostomy, Hiatal Hernia Repair, Extensive Lysis of Adhesions and Esaphagogastrodudenoscopy.;  Surgeon: Francis Vyas MD;  [facility]     GASTROJEJUNOSTOMY  08/26/09    Extensice enterolysis, partial resect. jejunum, part. resect gastric pouch, gastrojejunostomy anastomosis     HC ESOPH/GAS REFLUX TEST W NASAL IMPED ELECTRODE  8/5/2013    Procedure: ESOPHAGEAL IMPEDENCE FUNCTION TEST 1 HOUR OR LESS;  Surgeon: Halie Lang MD;  [facility]     HEAD & NECK SURGERY  2/15/2017    C5-C6     HERNIA REPAIR  2006    Umbilical hernia     HERNIORRHAPHY HIATAL  6/22/2012    Procedure: HERNIORRHAPHY HIATAL;;  Surgeon: Francis Vyas MD;  [facility]     HERNIORRHAPHY INGUINAL  11/22/2013    Procedure: HERNIORRHAPHY INGUINAL;;  Surgeon: Francis Vyas MD;  [facility]     INSERT PICC LINE Right 12/19/2019    Procedure: Picc Placement;  Surgeon: Per Dumont PA-C;  [facility]     INSERT PICC LINE Right 2/21/2020    Procedure: INSERTION, PICC;  Surgeon: Per Dumont PA-C;  [facility]     INSERT PORT VASCULAR ACCESS Right 12/19/2017    Procedure: INSERT PORT VASCULAR ACCESS;  Right Chest Port Placement ;  Surgeon: Lisandro Alejandro PA-C;  [facility]     INSERT PORT VASCULAR ACCESS Right 8/2/2018    Procedure: INSERT PORT VASCULAR ACCESS;  Place single lumen tunneled central venous access catheter;  Surgeon: Guy Jamil PA-C;  [facility]     IR CVC TUNNEL PLACEMENT > 5 YRS OF AGE  8/7/2019     IR CVC TUNNEL PLACEMENT > 5 YRS OF AGE  4/14/2020     IR CVC TUNNEL PLACEMENT > 5 YRS OF AGE  8/3/2020     IR CVC TUNNEL PLACEMENT > 5 YRS OF AGE  9/4/2020     IR CVC TUNNEL PLACEMENT > 5 YRS OF AGE  2/5/2021     IR CVC TUNNEL PLACEMENT > 5 YRS OF AGE  3/23/2021     IR CVC TUNNEL PLACEMENT > 5 YRS OF AGE  1/13/2022     IR CVC TUNNEL PLACEMENT > 5 YRS OF AGE  5/12/2022     IR CVC TUNNEL REMOVAL RIGHT   10/1/2019     IR CVC TUNNEL REMOVAL RIGHT  7/30/2020     IR CVC TUNNEL REMOVAL RIGHT  9/2/2020     IR CVC TUNNEL REMOVAL RIGHT  2/3/2021     IR CVC TUNNEL REMOVAL RIGHT  3/19/2021     IR CVC TUNNEL REMOVAL RIGHT  1/10/2022     IR CVC TUNNEL REMOVAL RIGHT  5/9/2022     IR CVC TUNNEL REVISION RIGHT  5/7/2021     IR FOLLOW UP VISIT OUTPATIENT  8/7/2019     IR PICC PLACEMENT > 5 YRS OF AGE  3/7/2019     IR PICC PLACEMENT > 5 YRS OF AGE  12/19/2019     IR PICC PLACEMENT > 5 YRS OF AGE  2/21/2020     LAPAROTOMY EXPLORATORY  11/22/2013    Procedure: LAPAROTOMY EXPLORATORY;  Exploratory Laparotomy, Upper Endoscopy, Left Inguinal Hernia Repair;  Surgeon: Francis Vyas MD;  [facility]     ORTHOPEDIC SURGERY       PICC INSERTION Right 03/16/2017    5fr DL BioFlo PICC, 42cm (3cm external) in the R medial brachial vein w/ tip in the SVC RA junction.     PICC INSERTION Left 09/23/2017    5fr DL BioFlo PICC, 45cm (1cm external) in the L basilic vein w/ tip in the SVC RA junction.     PICC INSERTION Right 05/16/2019    5Fr - 43cm, Medial brachial vein, low SVC     PICC INSERTION Right 10/02/2019    5Fr - 43cm (2cm external), basilic vein, low SVC     SHAYLEE EN Y BOWEL  2003     SOFT TISSUE SURGERY       THORACIC SURGERY       TONSILLECTOMY       TRANSESOPHAGEAL ECHOCARDIOGRAM INTRAOPERATIVE N/A 1/8/2019    Procedure: TRANSESOPHAGEAL ECHOCARDIOGRAM INTRAOPERATIVE;  Surgeon: GENERIC ANESTHESIA PROVIDER;  [facility]     C GASTRIC BYPASS,OBESE<100CM SHAYLEE-EN-Y  2002    lost 300 pounds          Family History:   Reviewed and non-contributory.   Family History   Problem Relation Age of Onset     Gastrointestinal Disease Mother         Crohns disease     Anxiety Disorder Mother      Thyroid Disease Mother         Grave's disease     Cancer Father         ear cancer-skin cancer/melanoma     Breast Cancer Maternal Grandmother      Macular Degeneration Maternal Grandfather      Anxiety Disorder Sister      Diabetes Maternal  Uncle      Breast Cancer Other      Hypertension No family hx of      Hyperlipidemia No family hx of      Cerebrovascular Disease No family hx of      Prostate Cancer No family hx of      Depression No family hx of      Anesthesia Reaction No family hx of      Asthma No family hx of      Osteoporosis No family hx of      Genetic Disorder No family hx of      Obesity No family hx of      Mental Illness No family hx of      Substance Abuse No family hx of      Glaucoma No family hx of           Social History:     Social History     Tobacco Use     Smoking status: Light Tobacco Smoker     Packs/day: 0.10     Years: 3.00     Pack years: 0.30     Types: Cigarettes     Smokeless tobacco: Never Used     Tobacco comment: 2/4/2021    smokes 3 cigarettes/day   Substance Use Topics     Alcohol use: No     Comment: quit 2002     History   Sexual Activity     Sexual activity: Yes     Partners: Female     Birth control/ protection: None     Comment: no protection          Current Medications:       [START ON 7/8/2022] diphenhydrAMINE  50 mg Oral Q12H     [Held by provider] enoxaparin ANTICOAGULANT  40 mg Subcutaneous Q24H     [START ON 7/8/2022] fentaNYL  25 mcg Transdermal Q48H     fentaNYL   Transdermal Q8H CECILE     Lidocaine  1-2 patch Transdermal Q24H     lidocaine   Transdermal Q8H CECILE     meropenem  1 g Intravenous Q8H     pantoprazole (PROTONIX) IV  40 mg Intravenous Daily     [START ON 7/8/2022] polyethylene glycol  17 g Oral Daily     sodium chloride (PF)  3 mL Intracatheter Q8H     sodium chloride (PF)  3 mL Intracatheter Q8H     sucralfate  1 g Oral 4x Daily AC & HS     [START ON 7/8/2022] vancomycin  1,250 mg Intravenous Q12H     [START ON 7/10/2022] vitamin D2  50,000 Units Oral Weekly          Allergies:     Allergies   Allergen Reactions     Bactrim [Sulfamethoxazole W/Trimethoprim] Rash     Penicillins Anaphylaxis     Please see Antimicrobial Management Team allergy assessment note 10/10/2018. Patient reported  tolerating amoxicillin.     Doxycycline Rash     Vancomycin Rash     Rash after receiving vancomycin 3/28/16 (infusion reaction?). Tolerated with slower infusion and diphenhydramine premed.          Physical Exam:   Vitals were reviewed  Patient Vitals for the past 24 hrs:   BP Temp Temp src Pulse Resp SpO2   07/07/22 1900 107/65 -- -- 102 -- --   07/07/22 1645 105/51 -- -- -- -- --   07/07/22 1600 134/81 -- -- 100 -- --   07/07/22 1433 -- 97.6  F (36.4  C) Oral -- 18 98 %   07/07/22 1429 -- 98.1  F (36.7  C) Oral -- -- --   07/07/22 1315 111/67 -- -- 50 -- --   07/07/22 1313 111/67 98  F (36.7  C) Oral 55 18 97 %   07/07/22 1218 117/73 98.7  F (37.1  C) Oral 71 16 100 %     Physical Examination:  GENERAL: Remains somewhat sleepy, but much improved per patient and wife  HEENT:  Head is normocephalic, atraumatic   EYES:  Eyes have anicteric sclerae without conjunctival injection or stigmata of endocarditis.    ENT:  Oropharynx is moist without exudates or ulcers. Tongue is midline  NECK:  Supple. No cervical lymphadenopathy  LUNGS:  Course at bases  CARDIOVASCULAR:  Regular rate and rhythm with no murmurs, gallops or rubs.  ABDOMEN:  Normal bowel sounds, soft, nontender. No appreciable hepatosplenomegaly  SKIN:  Line(s) are in place with mild surrounding erythema, no exudate noted. No stigmata of endocarditis.  NEUROLOGIC:  Grossly nonfocal. Active x4 extremities         Laboratory Data:     Inflammatory Markers    Recent Labs   Lab Test 07/07/22  1231 05/07/22  1423 03/15/22  1442 02/23/22  1621 01/08/22  1430 11/16/21  1300 08/24/21  0855 07/13/21  1110 11/17/20  1445 07/28/20  1607 06/28/19  1345 05/14/19  1145 02/12/18  1400 01/23/18  0845 01/20/18  1030 10/02/17  1030 09/24/17  1900 06/29/17  1009 06/28/17  2222 03/12/17  0351   SED  --   --   --  10  --   --   --   --   --  10  --  13  --  10 11  --  31*  --  26* 17*   CRP 47.70* 34.0* <2.9 <2.9 52.0* <2.9 <2.9 7.8   < > <2.9   < >  --    < > <2.9 5.4   < >  26.6*   < > 53.0* 3.4    < > = values in this interval not displayed.     Hematology Studies    Recent Labs   Lab Test 07/07/22  2213 07/07/22  1852 07/07/22  1231 07/05/22  1515 06/29/22  1530 06/14/22  1230 07/13/21  1110 06/29/21  1016 06/14/21  1017 06/09/21  1044 06/01/21  1117 05/25/21  1147 05/19/21  1342 05/18/21  1015   WBC 8.4 3.2* 5.2 6.6 7.8 4.9   < > 6.9 8.1 7.5 7.3   < > 6.1 6.2   ANEU  --   --   --   --   --   --   --  3.1 4.1 4.7 4.3  --  3.5 3.7   AEOS  --   --   --   --   --   --   --  0.2 0.2 0.3 0.1  --  0.2 0.2   HGB 10.5* 10.0* 9.9* 10.1* 12.6* 11.7*   < > 12.1* 12.0* 13.3 12.1*   < > 12.5* 12.6*   MCV 96 97 98 99 95 97   < > 96 97 96 94   < > 97 98   PLT 99* 83* 114* 105* 169 149*   < > 178 158 169 174   < > 159 145*    < > = values in this interval not displayed.     Metabolic Studies     Recent Labs   Lab Test 07/07/22  1231 07/05/22  1515 06/29/22  1530 06/14/22  1230 05/31/22  1535   * 138 144 144 144   POTASSIUM 3.4 3.3* 3.6 3.4 3.9   CHLORIDE 101 103 111* 110* 111*   CO2 26 26 28 31 29   BUN 11.9 9.1 17 13 21   CR 0.77 0.73 0.75 0.80 0.68   GFRESTIMATED >90 >90 >90 >90 >90     Hepatic Studies    Recent Labs   Lab Test 07/07/22  1231 07/05/22  1515 06/29/22  1530 06/14/22  1230 05/31/22  1535 05/25/22  1040   BILITOTAL 0.2 0.5  0.5 0.3 0.3  0.3 0.3 0.6  0.6   ALKPHOS 52 53 55 50 52 51   ALBUMIN 3.5 3.5 3.4 3.2* 3.5 3.1*   AST 33 26 14 11 15 12   ALT 34 27 29 21 34 21       Historical microbiology:  Culture Micro   Date Value Ref Range Status   05/28/2021 No growth  Final   05/28/2021 No growth  Final   05/28/2021 No growth  Final   04/02/2021 No growth  Final   04/02/2021 No growth  Final   04/02/2021 No growth  Final   03/22/2021 No growth  Final   03/22/2021 No growth  Final   03/21/2021 No growth  Final   03/21/2021 No growth  Final   03/20/2021 No growth  Final   03/20/2021 No growth  Final   03/19/2021 (A)  Final    Cultured on the 2nd day of incubation:  Staphylococcus  epidermidis     03/19/2021   Final    Critical Value/Significant Value, preliminary result only, called to and read back by  Jerad Miranda RN at 13:05pm 3/21/21      03/19/2021   Final    (Note)  POSITIVE for STAPHYLOCOCCUS EPIDERMIDIS and NEGATIVE for the mecA  gene (not resistant to methicillin) by Verigene nucleic acid test.  The mecA gene was not detected. Final identification and  antimicrobial susceptibility testing will be verified by standard  methods.    Specimen tested with Verigene multiplex, gram-positive blood culture  nucleic acid test for the following targets: Staph aureus, Staph  epidermidis, Staph lugdunensis, other Staph species, Enterococcus  faecalis, Enterococcus faecium, Streptococcus species, S. agalactiae,  S. anginosus grp., S. pneumoniae, S. pyogenes, Listeria sp., mecA  (methicillin resistance) and Melvin/B (vancomycin resistance).    Critical Value/Significant Value called to and read back by Jerad Miranda RN at 1552 on 3.21.21 CW     03/19/2021 No growth  Final   03/19/2021 >100 colonies  Staphylococcus epidermidis   (A)  Final   03/19/2021 (A)  Final    15 to 50 colonies  Streptococcus bovis group  not isolated or reported on routine culture     03/19/2021 No anaerobes isolated  Final   02/04/2021 No growth  Final   02/03/2021 No growth  Final   02/03/2021 No growth  Final   01/30/2021 No growth  Final   01/30/2021 No growth  Final   01/29/2021   Final    (Note)  POSITIVE for Staphylococci other than S.aureus, S.epidermidis and  S.lugdunensis, by Verigene multiplex nucleic acid test.  Coagulase-negative staphylococci are the most common venipuncture or  collection associated skin CONTAMINANTS grown in blood cultures.  Final identification and antimicrobial susceptibility testing will be  verified by standard methods.    Specimen tested with Verigene multiplex, gram-positive blood culture  nucleic acid test for the following targets: Staph aureus, Staph  epidermidis, Staph lugdunensis,  other Staph species, Enterococcus  faecalis, Enterococcus faecium, Streptococcus species, S. agalactiae,  S. anginosus grp., S. pneumoniae, S. pyogenes, Listeria sp., mecA  (methicillin resistance) and Melvin/B (vancomycin resistance).    Critical Value/Significant Value called to and read back by MARTHA PAULINO RN @ 1/30/21 2231      01/29/2021 (A)  Final    Cultured on the 1st day of incubation:  Staphylococcus hominis  Susceptibility testing done on previous specimen     01/29/2021 (A)  Final    Cultured on the 1st day of incubation:  Staphylococcus capitis     01/29/2021   Final    Critical Value/Significant Value, preliminary result only, called to and read back by  Martha Paulino RN 01/30/21 @1939 ANGEL     01/29/2021 (A)  Final    Cultured on the 1st day of incubation:  Staphylococcus epidermidis     01/29/2021 (A)  Final    Cultured on the 1st day of incubation:  Enterococcus faecalis  Susceptibility testing done on previous specimen     01/29/2021 (A)  Final    Cultured on the 1st day of incubation:  Aerococcus viridans     01/29/2021 (A)  Final    Cultured on the 1st day of incubation:  Corynebacterium jeikeium     01/29/2021   Final    Critical Value/Significant Value, preliminary result only, called to and read back by  Phillip Bowden RN [facility] at 1328 on 2.2.21 rd.     01/29/2021 (A)  Final    Cultured on the 1st day of incubation:  Streptococcus salivarius group     01/29/2021   Final    Critical Value/Significant Value, preliminary result only, called to and read back by  POLINA VAUGNH RN 01/30/21 1326 EH.     01/29/2021 (A)  Final    Cultured on the 1st day of incubation:  Streptococcus mitis group     01/29/2021   Final    Critical Value/Significant Value, preliminary result only, called to and read back by  Phillip Bowden RN [facility] at 1328 on 2.2.21 rd.     01/28/2021 (A)  Final    Cultured on the 2nd day of incubation:  Gram positive bacilli resembling diphtheroids  No further identification     01/28/2021    Final    Critical Value/Significant Value, preliminary result only, called to and read back by  Jeet Paulino RN 01/30/21 @2007 ANGEL     01/28/2021 (A)  Final    Cultured on the 2nd day of incubation:  Corynebacterium species  Identification obtained by MALDI-TOF mass spectrometry research use only database. Test   characteristics determined and verified by the [facility]   ([facility]) [place], [place].     01/28/2021   Final    Critical Value/Significant Value, preliminary result only, called to and read back by  Phillip Bowden RN [facility] at 1328 on 2.2.21 rd.     01/28/2021 (A)  Final    Cultured on the 1st day of incubation:  Enterococcus faecalis     01/28/2021   Final    Critical Value/Significant Value, preliminary result only, called to and read back by   at 1325 1.29.21 KZ     01/28/2021 (A)  Final    Cultured on the 1st day of incubation:  Staphylococcus hominis     01/28/2021   Final    Critical Value/Significant Value, preliminary result only, called to and read back by  Dr. Buckner 1915 01.29.2021 NM     01/28/2021   Final    (Note)  POSITIVE for ENTEROCOCCUS FAECALIS and NEGATIVE for Melvin/vanB genes  by Verigene multiplex nucleic acid test. Final identification and  antimicrobial susceptibility testing will be verified by standard  methods.    Specimen tested with Verigene multiplex, gram-positive blood culture  nucleic acid test for the following targets: Staph aureus, Staph  epidermidis, Staph lugdunensis, other Staph species, Enterococcus  faecalis, Enterococcus faecium, Streptococcus species, S. agalactiae,  S. anginosus grp., S. pneumoniae, S. pyogenes, Listeria sp., mecA  (methicillin resistance) and Melvin/B (vancomycin resistance).    Critical Value/Significant Value called to and read back by Dr. Buckner, 1.29.21 @ 1555 pt.    POSITIVE for Staphylococci other than S.aureus, S.epidermidis and  S.lugdunensis, by Verigene multiplex nucleic acid test.  Coagulase-negative  staphylococci are the most common venipuncture or  collection associated skin CONTAMINANTS grown in blood cultures.  Final identification and antimicrobial susceptibility testing will be  verified by standard methods.    Specimen tested with Dexmoigene multiplex, gram-positive blood culture  nucleic acid test for the following targets: Staph aureus, Staph  epidermidis, Staph lugdunensis, other Staph species, Enterococcus  faecalis, Enterococcus faecium, Streptococcus species, S. agalactiae,  S. anginosus grp., S. pneumoniae, S. pyogenes, Listeria sp., mecA  (methicillin resistance) and Melvin/B (vancomycin resistance).    Critical Value/Significant Value called to and read back by Dr. Buckner  1917 01.29.2021 NM     11/17/2020 No growth  Final   11/17/2020 No growth  Final   11/17/2020 No growth  Final   10/29/2020 No growth  Final   10/29/2020 No growth  Final   09/03/2020 No growth  Final   09/03/2020 No growth  Final   09/02/2020 No growth  Final   09/02/2020 No growth  Final   09/02/2020 No growth  Final   09/02/2020 No growth  Final   09/02/2020 No growth  Final   09/01/2020 No growth  Final   09/01/2020 No growth  Final   08/31/2020 No growth  Final   08/31/2020 (A)  Final    Cultured on the 1st day of incubation:  Staphylococcus lugdunensis     08/31/2020   Final    Critical Value/Significant Value, preliminary result only, called to and read back by  Penny Alvarado RN @2200 09/01/2020 [facility]     08/29/2020 (A)  Final    Cultured on the 1st day of incubation:  Gram positive rods  Unable to further identify.     08/29/2020   Final    Critical Value/Significant Value, preliminary result only, called to and read back by  Dr. Sara Warren 2038 8/30/20 AM     08/29/2020 (A)  Final    Cultured on the 1st day of incubation:  Psychrobacter faecalis  Previously reported as:  Gram positive cocci  CORRECTED ON:  9.2.2020 at 1432. KVO  CORRECTED ON 09/11 AT 1505: PREVIOUSLY REPORTED AS Cultured on the 1st day of incubation:    Psychrobacter species Further speciated as: Psychrobacter faecalis Previously reported as:   Gram positive cocci CORRECTED ON: 9.2.2020 at 1432. KVO     08/29/2020   Final    Critical Value/Significant Value, preliminary result only, called to and read back by  Sandie Burroughs RN at 1412 9.1.20.DK     08/29/2020   Final    Corrected report called to and read back by  Sandie Burroughs RN on 9.2.2020 at 1432. KVO     08/29/2020   Final    (Note)  NEGATIVE for the following: Staphylococcus spp., Staph aureus, Staph  epidermidis, Staph lugdunensis, Streptococcus spp., Strep pneumoniae,  Strep pyogenes, Strep agalactiae, Strep anginosus group, Enterococcus  faecalis, Enterococcus faecium, and Listeria spp. by Critique^It  multiplex nucleic acid test. Final identification and antimicrobial  susceptibility testing will be verified by standard methods.    Specimen tested with Verigene multiplex, gram-positive blood culture  nucleic acid test for the following targets: Staph aureus, Staph  epidermidis, Staph lugdunensis, other Staph species, Enterococcus  faecalis, Enterococcus faecium, Streptococcus species, S. agalactiae,  S. anginosus grp., S. pneumoniae, S. pyogenes, Listeria sp., mecA  (methicillin resistance) and Melvin/B (vancomycin resistance).    Critical Value/Significant Value called to and read back by  Dr. Sara Warren 2038 8/30/20 AM       08/28/2020 No growth  Final   08/28/2020 No growth  Final   07/30/2020 (A)  Final    <15 colonies   Staphylococcus epidermidis  Susceptibility testing not routinely done     07/29/2020 No growth  Final   07/29/2020 No growth  Final   07/28/2020 No growth  Final   07/28/2020 (A)  Final    Cultured on the 2nd day of incubation:  Gram positive cocci in clusters  Upon further review, there is not any gram positive cocci in clusters present in this   blood culture.     07/28/2020 (A)  Final    Cultured on the 2nd day of incubation:  Corynebacterium species  Identification obtained by  MALDI-TOF mass spectrometry research use only database. Test   characteristics determined and verified by the Infectious Diseases Diagnostic Laboratory   ([facility]) [place], [place].     07/28/2020   Final    Critical Value/Significant Value, preliminary result only, called to and read back by  Richie Nugent RN 2223 7/30/20 AM     07/28/2020   Final    Updated report called to and read back by  Spring Lugo RN at 1300 on 8.2.20 [place]     07/28/2020   Final    (Note)  NEGATIVE for the following: Staphylococcus spp., Staph aureus, Staph  epidermidis, Staph lugdunensis, Streptococcus spp., Strep pneumoniae,  Strep pyogenes, Strep agalactiae, Strep anginosus group, Enterococcus  faecalis, Enterococcus faecium, and Listeria spp. by TweetDeck  multiplex nucleic acid test. Final identification and antimicrobial  susceptibility testing will be verified by standard methods.    Critical Value/Significant Value called to and read back by LIZET BAUGH RN U5B 0119 07.31.20 CF     07/26/2020 (A)  Final    Cultured on the 1st day of incubation:  Staphylococcus epidermidis  Susceptibility testing done on previous specimen     07/26/2020   Final    Critical Value/Significant Value, preliminary result only, called to and read back by  Neha Quintana Pharmacist [facility] 7.27.20 1735. BRANDON     07/26/2020 (A)  Final    Cultured on the 1st day of incubation:  Strain 2  Staphylococcus epidermidis  Susceptibility testing done on previous specimen     07/26/2020 (A)  Final    Cultured on the 1st day of incubation:  Staphylococcus epidermidis     07/26/2020   Final    Critical Value/Significant Value, preliminary result only, called to and read back by  Yaa Jarquin RN [facility] 7.27.20 1616. BRANDNO     07/26/2020 (A)  Final    Cultured on the 1st day of incubation:  Strain 2  Staphylococcus epidermidis     07/26/2020   Final    (Note)  POSITIVE for STAPHYLOCOCCUS EPIDERMIDIS and POSITIVE for the mecA  gene (resistant to methicillin) by [email or web address]  nucleic acid  test. Final identification and antimicrobial susceptibility testing  will be verified by standard methods.    Specimen tested with Verigene multiplex, gram-positive blood culture  nucleic acid test for the following targets: Staph aureus, Staph  epidermidis, Staph lugdunensis, other Staph species, Enterococcus  faecalis, Enterococcus faecium, Streptococcus species, S. agalactiae,  S. anginosus grp., S. pneumoniae, S. pyogenes, Listeria sp., mecA  (methicillin resistance) and Melvin/B (vancomycin resistance).    Critical Value/Significant Value called to and read back by Chantale Jarrett RN. @2003. 7.27.20. BS.        04/03/2020 No growth  Final   04/03/2020 No growth  Final   11/02/2019 No growth  Final   11/02/2019 No growth  Final   10/30/2019 No growth  Final   10/30/2019 (A)  Final    Cultured on the 3rd day of incubation:  Corynebacterium species  Identification obtained by MALDI-TOF mass spectrometry research use only database. Test   characteristics determined and verified by the Infectious Diseases Diagnostic Laboratory   ([facility]) [place], [place].     10/30/2019   Final    Critical Value/Significant Value, preliminary result only, called to and read back by   DR VEGA @ 1745. 11/1/2019 AV     10/30/2019 (A)  Final    Cultured on the 4th day of incubation:  Finegoldia magna (Peptostreptococcus otto)     10/30/2019   Final    Critical Value/Significant Value, preliminary result only, called to and read back by  DANI SANCHES RN 0200 11.3.19 NDP     10/30/2019   Final    (Note)  NEGATIVE for the following: Staphylococcus spp., Staph aureus, Staph  epidermidis, Staph lugdunensis, Streptococcus spp., Strep pneumoniae,  Strep pyogenes, Strep agalactiae, Strep anginosus group, Enterococcus  faecalis, Enterococcus faecium, and Listeria spp. by Verigene  multiplex nucleic acid test. Final identification and antimicrobial  susceptibility testing will be verified by standard methods.    Critical  Value/Significant Value called to and read back by  Francis Vyas MD @ 1745. 19. AV.           NEGATIVE for the following: Staphylococcus spp., Staph aureus, Staph  epidermidis, Staph lugdunensis, Streptococcus spp., Strep pneumoniae,  Strep pyogenes, Strep agalactiae, Strep anginosus group, Enterococcus  faecalis, Enterococcus faecium, and Listeria spp. by Paxer  multiplex nucleic acid test. Final identification and antimicrobial  susceptibility testing will be verified by standard methods.    Critical Value/Significant Value called to and read back by DANI SANCHES RN 0503 11.3.19 ND     10/29/2019 No growth  Final   10/17/2019 No growth  Final   10/17/2019 No growth  Final     Urine Studies    Recent Labs   Lab Test 22  2223 22  1520 22  1622 21  1644 20  1905 19  0025   LEUKEST Negative Negative Negative Negative Negative Negative   WBCU 2 0  --  1 <1 <1     Vancomycin Levels    Recent Labs   Lab Test 22  1040 22  1020 22  1116   VANCOMYCIN 13.9 13.0 8.6     Hepatitis B Testing   Recent Labs   Lab Test 17  0549   HEPBANG Nonreactive     Hepatitis C Testing     Hepatitis C Antibody   Date Value Ref Range Status   2017 Nonreactive NR^Nonreactive Final     Comment:     Assay performance characteristics have not been established for newborns,   infants, and children       Respiratory Virus Testing    No results found for: RS, FLUAG         Imagin/7/22 CXR  Bibasilar atelectasis and/or pneumonia.    22 Head CT  No acute intracranial findings or definite suspicious enhancement.

## 2022-07-08 NOTE — PROGRESS NOTES
CLINICAL NUTRITION SERVICES - ASSESSMENT NOTE     Nutrition Prescription    RECOMMENDATIONS FOR MDs/PROVIDERS TO ORDER:  Encourage acceptance of lipids   -if patient misses lipid for >1 week, he may be at risk for essential fatty acid deficiency.     Malnutrition Status:    Patient does not meet two criteria at this time     Recommendations already ordered by Registered Dietitian (RD):  Ordered Po4 and Magnesium (add on)   Initially recommend PPN formulation (see future recommendation). Patient reported he does not want lipids during hospitalization  TPN ordered: 2000 mL/day, 100 g dex, 100 g AA= 740 kcal (9 kcal/kg) and 1.2 g/kg protein  -Electrolytes/additives per PharmD     Future/Additional Recommendations:  If acceptance of lipids: 2000 mL/day, 100 g Dex, 100 g AA and 250 mL intralipid 7 days per week = 1240 kcal (15 kcal/kg)  Monitor ability to resume TPN      REASON FOR ASSESSMENT  Parker Acevedo is a/an 51 year old male assessed by the dietitian for Provider Order - Patient on TPN with hx of CLABSI admitted for bacteremia. TPN stopped by wife 7/5. Please advise on PPN for hospital course    NUTRITION HISTORY  Home TPN formula per [facility]:     TPN with lipid infused 3 days per week (MWF)  Wt 81 kg, Prosol 20% 100 gm/d , Dextrose 175 gm/d, Volume 1000 ml/d,  Intralipid 20% 250ml; Lytes per DAY:  Na 50 meq/d, K 70 meq/d, Ca 12  meq/d, Mag 6 meq/d, Phos15 mmol/d, 29% chloride, Infuvite 10 ml/day, Tralement1 ml/day, Famotidine 20 mg/d, zinc 5 mg/day     TPN without lipid infused 4 days per week  Wt 81 kg, Prosol 20% 100 gm/d, Dextrose 175 gm/d, Volume 1250 ml/d, Intralipid 20% zero; Lytes per DAY:  Na 50 meq/d, K 70 meq/d, Ca 12  meq/d, Mag 6 meq/d, Phos 15 mmol/d, 29% chloride, Infuvite 10 ml/day, Tralement 1 ml/day, famotidine 20 mg/d, zinc 5 mg/day;    12 hr cycle time with 1 hr taper up and down  Patient also uses IV hydration fluid: LR 1-2L IV daily PRN    Patient and wife in room at time of visit. Parker  "reported he eats about 300 kcal per day for pleasure. Reported he was not happy about current NPO status. He also does not want to receive lipid during hospitalization.     CURRENT NUTRITION ORDERS  Diet: NPO  Intake/Tolerance: N/A     LABS  Na 141 (WNL), K+ 3.2 (L), B12 481 (WNL)   Triglycerides 63 (7/5/22)     MEDICATIONS  Ergocalciferol 46898 units weekly     ANTHROPOMETRICS  Height: 180.3 cm (5' 11\")   Most Recent Weight:   79.5 kg (175 lb)   IBW: 78.2 kg  BMI: Normal BMI  Weight History: Patient reported IBW around 180 lbs   Wt Readings from Last 15 Encounters:   07/02/22 79.4 kg (175 lb)   05/10/22 81.5 kg (179 lb 9.6 oz)   04/13/22 86.2 kg (190 lb)   03/11/22 83.5 kg (184 lb)   02/27/22 85 kg (187 lb 6.4 oz)   02/23/22 86.2 kg (190 lb 1.6 oz)   01/11/22 87.8 kg (193 lb 9.6 oz)   10/14/21 87 kg (191 lb 12.8 oz)   07/14/21 91.6 kg (202 lb)   07/08/21 92 kg (202 lb 14.4 oz)   05/12/21 79.4 kg (175 lb 1.6 oz)   05/07/21 79.4 kg (175 lb)   03/23/21 87.1 kg (192 lb)   02/24/21 85.6 kg (188 lb 11.4 oz)   02/22/21 84.4 kg (186 lb)     Dosing Weight: 79.4 kg    ASSESSED NUTRITION NEEDS  Estimated Energy Needs: [id number] kcals/day (20 - 25- 30 kcals/kg)  Justification: Maintenance, lower/mid range with PPN   Estimated Protein Needs:  grams protein/day (1.2 - 1.5 grams of pro/kg)  Justification: Increased needs  Estimated Fluid Needs: 2000+ mL/day (1 mL/kcal)   Justification: Maintenance    PHYSICAL FINDINGS  See malnutrition section below.    MALNUTRITION  % Intake: Decreased intake does not meet criteria  (TPN last received day prior)   % Weight Loss: Unable to assess  Subcutaneous Fat Loss: Unable to assess  Muscle Loss: Thoracic region (clavicle, acromium bone, deltoid, trapezius, pectoral): mild  and Upper arm (bicep, tricep):  mild  Fluid Accumulation/Edema: None noted  Malnutrition Diagnosis: Patient does not meet two of the established criteria necessary for diagnosing malnutrition    NUTRITION " DIAGNOSIS  Inadequate parenteral nutrition infusion related to lack of central access as evidenced by need for peripheral parenteral nutrition and patient refusal of IV lipid      INTERVENTIONS  Implementation  Nutrition Education: RD role in care   Collaboration with other providers  Parenteral Nutrition/IV Fluids - start     Goals  Total avg nutritional intake to meet a minimum of 9 kcal/kg and 1.2 g PRO/kg daily (per dosing wt 79 kg).     Monitoring/Evaluation  Progress toward goals will be monitored and evaluated per protocol.    Sonya Estevez RD, LD  5C/BMT pager: [phone]

## 2022-07-08 NOTE — CONSULTS
Interventional Radiology Consult Service Note    Patient is on IR schedule 7/8 for a RIJ TCVC removal.   Labs WNL for procedure. COVID neg.    No NPO required.  Consent will be done prior to procedure.     Please contact the IR [place] RN at [phone] for estimated time of procedure.     Case discussed with Dr. Medeiros. This is a 51 year old male with a history of cardiomyopathy, chronic abdominal pain, GERD, RUE DVT, dysphagia, hyperlipidemia, malnutrition, short gut syndrome s/p baratric surgery with a chronic G tube vent and TPN dependence via CVC with frequent hospitalizations for recurrent CLABSI who presented to the ED on 7/7/2022 with fevers and AMS with concern for infection. Reportedly BCs were drawn 7/6 at home and returned positive 7/7 for several organisms. Pt was referred to the ED. IR is consulted for RIJ TCVC removal. This line is a DL 9.5F Cm and was placed 5/12/22 by IR at [facility] after a line holiday for bacteremia.     Dx lab for tip culture in Epic.    Expected date of discharge: TBD    Vitals:   /65 (BP Location: Right arm, Patient Position: Left side)   Pulse 76   Temp 100.1  F (37.8  C) (Oral)   Resp 18   SpO2 95%     Pertinent Labs:     Lab Results   Component Value Date    WBC 14.5 (H) 07/08/2022    WBC 8.4 07/07/2022    WBC 3.2 (L) 07/07/2022    WBC 6.9 06/29/2021    WBC 8.1 06/14/2021    WBC 7.5 06/09/2021       Lab Results   Component Value Date    HGB 10.6 07/08/2022    HGB 10.5 07/07/2022    HGB 10.0 07/07/2022    HGB 12.1 06/29/2021    HGB 12.0 06/14/2021    HGB 13.3 06/09/2021       Lab Results   Component Value Date     07/08/2022    PLT 99 07/07/2022    PLT 83 07/07/2022     06/29/2021     06/14/2021     06/09/2021       Lab Results   Component Value Date    INR 1.30 (H) 07/07/2022    INR 1.07 05/07/2021    PTT 34 07/07/2022    PTT 26 07/22/2019       Lab Results   Component Value Date    POTASSIUM 3.2 (L) 07/08/2022    POTASSIUM 3.6 06/29/2022     POTASSIUM 3.5 06/29/2021        SAILAJA Cordova CNP  Interventional Radiology  Pager: [phone]

## 2022-07-08 NOTE — PROGRESS NOTES
Clinic Care Coordination Contact  Ambulatory Care Coordination to Inpatient Care Management   Hand-In Communication    Date:  July 8, 2022  Name: Parker Acevedo is enrolled in Ambulatory Care Coordination program and I am the Lead Care Coordinator.  CC Contact Information: Epic InBasket + phone  Payor Source: Payor: [facility] / Plan: [facility] MEDICARE ADVANTAGE / Product Type: Medicare /   Current services in place:     Please see the CC Snaphot and Care Management Flowsheets for specific  details of this Parker Acevedo care plan.   Additional details/specific concerns r/t this admission:    Goals of Care .    Goals        General       Medical (pt-stated)       Goal Statement: I will complete my annual wellness visit.  Date Goal set: 5/20/22  Barriers: complex care, and high volume of appointments at baseline  Strengths: wife is pts main caregiver, and organizes his appointments.   Date to Achieve By: 9/2022  Patient expressed understanding of goal: yes  Action steps to achieve this goal:  1. I will schedule my annual wellness visit; [id number] ([phone])  2. I will attend my annual wellness visit.  3. I will contact my Care Management or clinic team if I have barriers to attending my annual wellness visit.              I will follow this admission in Epic. Please feel free to contact me with questions or for further collaboration in discharge planning.    Jami Blank RN, BSN, PHN Care Coordinator  [facility]   Phone: [phone]

## 2022-07-09 LAB
ALBUMIN SERPL BCG-MCNC: 2.7 G/DL (ref 3.5–5.2)
ALP SERPL-CCNC: 133 U/L (ref 40–129)
ALT SERPL W P-5'-P-CCNC: 134 U/L (ref 10–50)
ANION GAP SERPL CALCULATED.3IONS-SCNC: 7 MMOL/L (ref 7–15)
AST SERPL W P-5'-P-CCNC: 62 U/L (ref 10–50)
BACTERIA BLD CULT: ABNORMAL
BASOPHILS # BLD AUTO: 0 10E3/UL (ref 0–0.2)
BASOPHILS NFR BLD AUTO: 0 %
BILIRUB DIRECT SERPL-MCNC: <0.2 MG/DL (ref 0–0.3)
BILIRUB SERPL-MCNC: 0.3 MG/DL
BUN SERPL-MCNC: 10.1 MG/DL (ref 6–20)
CALCIUM SERPL-MCNC: 7.9 MG/DL (ref 8.6–10)
CHLORIDE SERPL-SCNC: 110 MMOL/L (ref 98–107)
CREAT SERPL-MCNC: 0.64 MG/DL (ref 0.67–1.17)
DEPRECATED HCO3 PLAS-SCNC: 25 MMOL/L (ref 22–29)
EOSINOPHIL # BLD AUTO: 0.1 10E3/UL (ref 0–0.7)
EOSINOPHIL NFR BLD AUTO: 1 %
ERYTHROCYTE [DISTWIDTH] IN BLOOD BY AUTOMATED COUNT: 14.9 % (ref 10–15)
GFR SERPL CREATININE-BSD FRML MDRD: >90 ML/MIN/1.73M2
GLUCOSE SERPL-MCNC: 85 MG/DL (ref 70–99)
HCT VFR BLD AUTO: 31.8 % (ref 40–53)
HGB BLD-MCNC: 10.2 G/DL (ref 13.3–17.7)
HOLD SPECIMEN: NORMAL
IMM GRANULOCYTES # BLD: 0 10E3/UL
IMM GRANULOCYTES NFR BLD: 0 %
INR PPP: 1.18 (ref 0.85–1.15)
LYMPHOCYTES # BLD AUTO: 1.6 10E3/UL (ref 0.8–5.3)
LYMPHOCYTES NFR BLD AUTO: 14 %
MAGNESIUM SERPL-MCNC: 2.1 MG/DL (ref 1.7–2.3)
MCH RBC QN AUTO: 31.2 PG (ref 26.5–33)
MCHC RBC AUTO-ENTMCNC: 32.1 G/DL (ref 31.5–36.5)
MCV RBC AUTO: 97 FL (ref 78–100)
MONOCYTES # BLD AUTO: 1.2 10E3/UL (ref 0–1.3)
MONOCYTES NFR BLD AUTO: 10 %
NEUTROPHILS # BLD AUTO: 8.7 10E3/UL (ref 1.6–8.3)
NEUTROPHILS NFR BLD AUTO: 75 %
NRBC # BLD AUTO: 0 10E3/UL
NRBC BLD AUTO-RTO: 0 /100
PHOSPHATE SERPL-MCNC: 2.4 MG/DL (ref 2.5–4.5)
PLATELET # BLD AUTO: 97 10E3/UL (ref 150–450)
POTASSIUM SERPL-SCNC: 3.5 MMOL/L (ref 3.4–5.3)
PROT SERPL-MCNC: 5.1 G/DL (ref 6.4–8.3)
RBC # BLD AUTO: 3.27 10E6/UL (ref 4.4–5.9)
SODIUM SERPL-SCNC: 142 MMOL/L (ref 136–145)
VANCOMYCIN SERPL-MCNC: 10.1 UG/ML
WBC # BLD AUTO: 11.7 10E3/UL (ref 4–11)

## 2022-07-09 PROCEDURE — 96376 TX/PRO/DX INJ SAME DRUG ADON: CPT | Performed by: EMERGENCY MEDICINE

## 2022-07-09 PROCEDURE — 250N000013 HC RX MED GY IP 250 OP 250 PS 637: Performed by: STUDENT IN AN ORGANIZED HEALTH CARE EDUCATION/TRAINING PROGRAM

## 2022-07-09 PROCEDURE — 36415 COLL VENOUS BLD VENIPUNCTURE: CPT | Performed by: INTERNAL MEDICINE

## 2022-07-09 PROCEDURE — 258N000003 HC RX IP 258 OP 636: Performed by: INTERNAL MEDICINE

## 2022-07-09 PROCEDURE — 250N000009 HC RX 250: Performed by: INTERNAL MEDICINE

## 2022-07-09 PROCEDURE — 250N000011 HC RX IP 250 OP 636: Performed by: INTERNAL MEDICINE

## 2022-07-09 PROCEDURE — 250N000013 HC RX MED GY IP 250 OP 250 PS 637: Performed by: EMERGENCY MEDICINE

## 2022-07-09 PROCEDURE — 84134 ASSAY OF PREALBUMIN: CPT | Performed by: INTERNAL MEDICINE

## 2022-07-09 PROCEDURE — 96366 THER/PROPH/DIAG IV INF ADDON: CPT | Performed by: EMERGENCY MEDICINE

## 2022-07-09 PROCEDURE — 85004 AUTOMATED DIFF WBC COUNT: CPT

## 2022-07-09 PROCEDURE — 87040 BLOOD CULTURE FOR BACTERIA: CPT | Performed by: INTERNAL MEDICINE

## 2022-07-09 PROCEDURE — 258N000003 HC RX IP 258 OP 636: Performed by: EMERGENCY MEDICINE

## 2022-07-09 PROCEDURE — 84100 ASSAY OF PHOSPHORUS: CPT | Performed by: INTERNAL MEDICINE

## 2022-07-09 PROCEDURE — 85610 PROTHROMBIN TIME: CPT | Performed by: INTERNAL MEDICINE

## 2022-07-09 PROCEDURE — 80202 ASSAY OF VANCOMYCIN: CPT | Performed by: INTERNAL MEDICINE

## 2022-07-09 PROCEDURE — 82248 BILIRUBIN DIRECT: CPT | Performed by: INTERNAL MEDICINE

## 2022-07-09 PROCEDURE — 80053 COMPREHEN METABOLIC PANEL: CPT

## 2022-07-09 PROCEDURE — C9113 INJ PANTOPRAZOLE SODIUM, VIA: HCPCS

## 2022-07-09 PROCEDURE — 250N000011 HC RX IP 250 OP 636

## 2022-07-09 PROCEDURE — 250N000011 HC RX IP 250 OP 636: Performed by: STUDENT IN AN ORGANIZED HEALTH CARE EDUCATION/TRAINING PROGRAM

## 2022-07-09 PROCEDURE — 120N000002 HC R&B MED SURG/OB UMMC

## 2022-07-09 PROCEDURE — 250N000013 HC RX MED GY IP 250 OP 250 PS 637: Performed by: INTERNAL MEDICINE

## 2022-07-09 PROCEDURE — 83735 ASSAY OF MAGNESIUM: CPT | Performed by: INTERNAL MEDICINE

## 2022-07-09 PROCEDURE — 99233 SBSQ HOSP IP/OBS HIGH 50: CPT | Performed by: INTERNAL MEDICINE

## 2022-07-09 PROCEDURE — 250N000011 HC RX IP 250 OP 636: Performed by: EMERGENCY MEDICINE

## 2022-07-09 RX ORDER — POTASSIUM CHLORIDE 750 MG/1
40 TABLET, EXTENDED RELEASE ORAL ONCE
Status: COMPLETED | OUTPATIENT
Start: 2022-07-09 | End: 2022-07-09

## 2022-07-09 RX ADMIN — OXYCODONE HYDROCHLORIDE 10 MG: 5 SOLUTION ORAL at 17:51

## 2022-07-09 RX ADMIN — DIPHENHYDRAMINE HYDROCHLORIDE 50 MG: 50 CAPSULE ORAL at 17:51

## 2022-07-09 RX ADMIN — DIPHENHYDRAMINE HYDROCHLORIDE 50 MG: 50 CAPSULE ORAL at 13:03

## 2022-07-09 RX ADMIN — DIPHENHYDRAMINE HYDROCHLORIDE 50 MG: 50 CAPSULE ORAL at 05:50

## 2022-07-09 RX ADMIN — CEFEPIME HYDROCHLORIDE 2 G: 2 INJECTION, POWDER, FOR SOLUTION INTRAVENOUS at 08:26

## 2022-07-09 RX ADMIN — SUCRALFATE 1 G: 1 SUSPENSION ORAL at 08:27

## 2022-07-09 RX ADMIN — CEFEPIME HYDROCHLORIDE 2 G: 2 INJECTION, POWDER, FOR SOLUTION INTRAVENOUS at 15:47

## 2022-07-09 RX ADMIN — VANCOMYCIN HYDROCHLORIDE 1250 MG: 10 INJECTION, POWDER, LYOPHILIZED, FOR SOLUTION INTRAVENOUS at 05:50

## 2022-07-09 RX ADMIN — VANCOMYCIN HYDROCHLORIDE 1500 MG: 10 INJECTION, POWDER, LYOPHILIZED, FOR SOLUTION INTRAVENOUS at 17:23

## 2022-07-09 RX ADMIN — ONDANSETRON 4 MG: 4 TABLET, ORALLY DISINTEGRATING ORAL at 11:57

## 2022-07-09 RX ADMIN — OXYCODONE HYDROCHLORIDE 10 MG: 5 SOLUTION ORAL at 05:50

## 2022-07-09 RX ADMIN — MAGNESIUM SULFATE HEPTAHYDRATE: 500 INJECTION, SOLUTION INTRAMUSCULAR; INTRAVENOUS at 20:42

## 2022-07-09 RX ADMIN — SUCRALFATE 1 G: 1 SUSPENSION ORAL at 13:07

## 2022-07-09 RX ADMIN — OXYCODONE HYDROCHLORIDE 10 MG: 5 SOLUTION ORAL at 11:55

## 2022-07-09 RX ADMIN — ENOXAPARIN SODIUM 40 MG: 40 INJECTION SUBCUTANEOUS at 18:59

## 2022-07-09 RX ADMIN — DEXTROAMPHETAMINE SACCHARATE, AMPHETAMINE ASPARTATE, DEXTROAMPHETAMINE SULFATE AND AMPHETAMINE SULFATE 20 MG: 2.5; 2.5; 2.5; 2.5 TABLET ORAL at 08:26

## 2022-07-09 RX ADMIN — POTASSIUM CHLORIDE 40 MEQ: 750 TABLET, EXTENDED RELEASE ORAL at 10:43

## 2022-07-09 RX ADMIN — PANTOPRAZOLE SODIUM 40 MG: 40 INJECTION, POWDER, FOR SOLUTION INTRAVENOUS at 13:03

## 2022-07-09 RX ADMIN — LORAZEPAM 1 MG: 0.5 TABLET ORAL at 20:50

## 2022-07-09 ASSESSMENT — ACTIVITIES OF DAILY LIVING (ADL)
SWALLOWING: 0-->SWALLOWS FOODS/LIQUIDS WITHOUT DIFFICULTY
ADLS_ACUITY_SCORE: 39
ADLS_ACUITY_SCORE: 39
ADLS_ACUITY_SCORE: 24
DIFFICULTY_EATING/SWALLOWING: YES
DOING_ERRANDS_INDEPENDENTLY_DIFFICULTY: NO
ADLS_ACUITY_SCORE: 24
EATING: 0-->INDEPENDENT
CHANGE_IN_FUNCTIONAL_STATUS_SINCE_ONSET_OF_CURRENT_ILLNESS/INJURY: NO
ADLS_ACUITY_SCORE: 39
EATING: 0-->INDEPENDENT
SWALLOWING: 0-->SWALLOWS FOODS/LIQUIDS WITHOUT DIFFICULTY (DEVELOPMENTALLY APPROPRIATE)
ADLS_ACUITY_SCORE: 24
ADLS_ACUITY_SCORE: 24
WEAR_GLASSES_OR_BLIND: NO
ADLS_ACUITY_SCORE: 39
WALKING_OR_CLIMBING_STAIRS_DIFFICULTY: NO
ADLS_ACUITY_SCORE: 24
EQUIPMENT_CURRENTLY_USED_AT_HOME: CANE, STRAIGHT
ADLS_ACUITY_SCORE: 39
FALL_HISTORY_WITHIN_LAST_SIX_MONTHS: NO
DRESSING/BATHING_DIFFICULTY: NO
ADLS_ACUITY_SCORE: 24
EATING/SWALLOWING: OTHER (SEE COMMENTS)
CONCENTRATING,_REMEMBERING_OR_MAKING_DECISIONS_DIFFICULTY: NO
ADLS_ACUITY_SCORE: 39
TOILETING_ISSUES: NO

## 2022-07-09 NOTE — PROGRESS NOTES
Patient was persistent about wanting to smoke and declined nicotine patch. Provider was at bedside explaining the risk involved while he's out for smoke. With provider's consent and explanation, and wife must go and assist the patient - patient agreed and understood the risk involved stated by the provider. Patient was out for about 15 minutes for smoke.

## 2022-07-09 NOTE — PLAN OF CARE
Admitted/transferred from:   2 RN  skin assessment completed by Bijan Daly RN and Khalida CAPPS  Skin assessment finding: right abdominal bruised, G-tube clamped, right arm swollen from IV infiltrated down in ED prior arrival to 7B. ICE pack given and elivated  Interventions/actions: G-tube site cleaned and dressing changed     Bedside Emergency Equipment Present:  Suction Regulator: yes  Suction Canister: yes   Tubing between Regulator and Canister:   O2 Regulator with Tree: yes  Ambu Bag: yes.

## 2022-07-09 NOTE — PHARMACY-VANCOMYCIN DOSING SERVICE
Pharmacy Vancomycin Note  Date of Service 2022  Patient's  1970   51 year old, male    Indication: Bacteremia and Sepsis  Day of Therapy: 3  Current vancomycin regimen: 1250 mg IV q12h  Current vancomycin monitoring method: AUC  Current vancomycin therapeutic monitoring goal: 400-600 mg*h/L      InsightRX Prediction of Current Vancomycin Regimen    Regimen: 1250 mg IV every 12 hours  Exposure target: AUC24 (range)400-600 mg/L.hr   AUC24,ss: 376 mg/L.hr   Probability of AUC24 > 400: 37 %  Ctrough,ss: 9.1 mg/L  Probability of Ctrough,ss > 20: 0 %  Probability of nephrotoxicity (Lodise CARMELA ): 5 %      Current estimated CrCl = Estimated Creatinine Clearance: 156.8 mL/min (A) (based on SCr of 0.64 mg/dL (L)).    Creatinine for last 3 days  2022: 12:31 PM Creatinine 0.77 mg/dL  2022:  4:59 AM Creatinine 0.79 mg/dL;  6:58 PM Creatinine 0.73 mg/dL  2022:  6:01 AM Creatinine 0.64 mg/dL    Recent Vancomycin Levels (past 3 days)  2022:  2:34 PM Vancomycin 10.1 ug/mL    Vancomycin IV Administrations (past 72 hours)                   vancomycin 1250 mg in 0.9% NaCl 250 mL intermittent infusion 1,250 mg (mg) 1,250 mg New Bag 22 0550     1,250 mg New Bag 22 1529     1,250 mg New Bag  0301    vancomycin (VANCOCIN) 2,000 mg in sodium chloride 0.9 % 500 mL intermittent infusion (mg) 2,000 mg New Bag 22 1449                Nephrotoxins and other renal medications (From now, onward)    Start     Dose/Rate Route Frequency Ordered Stop    22 1700  vancomycin 1500 mg in 0.9% NaCl 250 ml intermittent infusion 1,500 mg         1,500 mg  over 90 Minutes Intravenous EVERY 12 HOURS 22 1637               Contrast Orders - past 72 hours (72h ago, onward)    Start     Dose/Rate Route Frequency Stop    22 194  iopamidol (ISOVUE-370) solution 75 mL         75 mL Intravenous ONCE 22          Interpretation of levels and current regimen:  Vancomycin level is  reflective of AUC less than 400.    Has serum creatinine changed greater than 50% in last 72 hours: No    Urine output: unable to determine    Renal Function: Stable      InsightRX Prediction of Planned New Vancomycin Regimen    Regimen: 1500 mg IV every 12 hours  Exposure target: AUC24 (range)400-600 mg/L.hr   AUC24,ss: 451 mg/L.hr  Probability of AUC24 > 400: 74 %  Ctrough,ss: 11.2 mg/L  Probability of Ctrough,ss > 20: 1 %  Probability of nephrotoxicity (Lodise CARMELA 2009): 7 %      Plan:  1. Increase Dose to vancomycin 1500 mg IV every 12 hours.  2. Vancomycin monitoring method: AUC  3. Vancomycin therapeutic monitoring goal: 400-600 mg*h/L  4. Pharmacy will check vancomycin levels as appropriate in 1-3 Days.  5. Serum creatinine levels will be ordered daily for the first week of therapy and at least twice weekly for subsequent weeks.      Wai HerndonD, BCPS  July 9, 2022

## 2022-07-09 NOTE — PLAN OF CARE
Vitals:    07/09/22 0600 07/09/22 0845 07/09/22 1110 07/09/22 1207   BP: 116/79 114/78 116/76 122/82   BP Location:    Left arm   Pulse:  80 82 64   Resp:  18 18 18   Temp:  97.9  F (36.6  C)  98.1  F (36.7  C)   TempSrc:  Oral  Oral   SpO2: 96% 99% 99% 98%    Pt arrived from ED, vitally stable, sating 98% on room air, none labor breathing,  Belly soft, audible BS, G-tube clamped , bruising on left side of abdomin, right arm swollen from IV infiltration in ED,  voided  adequate, up independently, Ortho Boot on right leg, right anterior chest central line removed, dressing intact,   Pt remain NPO, wants to drink, MD paged,  pt does go out side to smoke,   Continue with plan of care.

## 2022-07-09 NOTE — PROGRESS NOTES
Pt has one PIV on the left arm - currently infusing antibiotics. He will start parenteral nutrition via the current PIV. [facility] consult was ordered for a second piv placement but pt refused to have a second PIV. His right arm is swollen with no redness from an infiltrated PIV on the right AC from the ED last night. He reports discomfort with ROM. See media for records. Pt can not remember if it was vancomycin or KCl that infiltrated. Will continue to monitor RUE swelling.

## 2022-07-10 ENCOUNTER — APPOINTMENT (OUTPATIENT)
Dept: ULTRASOUND IMAGING | Facility: CLINIC | Age: 52
DRG: 314 | End: 2022-07-10
Payer: COMMERCIAL

## 2022-07-10 ENCOUNTER — APPOINTMENT (OUTPATIENT)
Dept: SPEECH THERAPY | Facility: CLINIC | Age: 52
DRG: 314 | End: 2022-07-10
Attending: INTERNAL MEDICINE
Payer: COMMERCIAL

## 2022-07-10 LAB
ALBUMIN SERPL BCG-MCNC: 2.9 G/DL (ref 3.5–5.2)
ALP SERPL-CCNC: 126 U/L (ref 40–129)
ALT SERPL W P-5'-P-CCNC: 102 U/L (ref 10–50)
ANION GAP SERPL CALCULATED.3IONS-SCNC: 8 MMOL/L (ref 7–15)
AST SERPL W P-5'-P-CCNC: 25 U/L (ref 10–50)
BACTERIA BLD CULT: ABNORMAL
BACTERIA SPEC CULT: NO GROWTH
BASOPHILS # BLD AUTO: 0 10E3/UL (ref 0–0.2)
BASOPHILS NFR BLD AUTO: 0 %
BILIRUB SERPL-MCNC: 0.3 MG/DL
BUN SERPL-MCNC: 8.1 MG/DL (ref 6–20)
CALCIUM SERPL-MCNC: 8.1 MG/DL (ref 8.6–10)
CHLORIDE SERPL-SCNC: 107 MMOL/L (ref 98–107)
CREAT SERPL-MCNC: 0.62 MG/DL (ref 0.67–1.17)
DEPRECATED HCO3 PLAS-SCNC: 25 MMOL/L (ref 22–29)
EOSINOPHIL # BLD AUTO: 0.2 10E3/UL (ref 0–0.7)
EOSINOPHIL NFR BLD AUTO: 3 %
ERYTHROCYTE [DISTWIDTH] IN BLOOD BY AUTOMATED COUNT: 14.8 % (ref 10–15)
GFR SERPL CREATININE-BSD FRML MDRD: >90 ML/MIN/1.73M2
GLUCOSE SERPL-MCNC: 109 MG/DL (ref 70–99)
HCT VFR BLD AUTO: 33.8 % (ref 40–53)
HGB BLD-MCNC: 10.9 G/DL (ref 13.3–17.7)
IMM GRANULOCYTES # BLD: 0.1 10E3/UL
IMM GRANULOCYTES NFR BLD: 1 %
LYMPHOCYTES # BLD AUTO: 2.2 10E3/UL (ref 0.8–5.3)
LYMPHOCYTES NFR BLD AUTO: 25 %
MAGNESIUM SERPL-MCNC: 2 MG/DL (ref 1.7–2.3)
MCH RBC QN AUTO: 31.1 PG (ref 26.5–33)
MCHC RBC AUTO-ENTMCNC: 32.2 G/DL (ref 31.5–36.5)
MCV RBC AUTO: 97 FL (ref 78–100)
MONOCYTES # BLD AUTO: 0.9 10E3/UL (ref 0–1.3)
MONOCYTES NFR BLD AUTO: 10 %
NEUTROPHILS # BLD AUTO: 5.5 10E3/UL (ref 1.6–8.3)
NEUTROPHILS NFR BLD AUTO: 61 %
NRBC # BLD AUTO: 0 10E3/UL
NRBC BLD AUTO-RTO: 0 /100
PHOSPHATE SERPL-MCNC: 2.5 MG/DL (ref 2.5–4.5)
PLATELET # BLD AUTO: 124 10E3/UL (ref 150–450)
POTASSIUM SERPL-SCNC: 3.6 MMOL/L (ref 3.4–5.3)
PROT SERPL-MCNC: 5.4 G/DL (ref 6.4–8.3)
RADIOLOGIST FLAGS: ABNORMAL
RBC # BLD AUTO: 3.5 10E6/UL (ref 4.4–5.9)
SODIUM SERPL-SCNC: 140 MMOL/L (ref 136–145)
WBC # BLD AUTO: 8.8 10E3/UL (ref 4–11)

## 2022-07-10 PROCEDURE — 92610 EVALUATE SWALLOWING FUNCTION: CPT | Mod: GN

## 2022-07-10 PROCEDURE — 250N000013 HC RX MED GY IP 250 OP 250 PS 637: Performed by: EMERGENCY MEDICINE

## 2022-07-10 PROCEDURE — C9113 INJ PANTOPRAZOLE SODIUM, VIA: HCPCS

## 2022-07-10 PROCEDURE — 120N000002 HC R&B MED SURG/OB UMMC

## 2022-07-10 PROCEDURE — 93970 EXTREMITY STUDY: CPT | Mod: 26 | Performed by: RADIOLOGY

## 2022-07-10 PROCEDURE — 250N000011 HC RX IP 250 OP 636: Performed by: STUDENT IN AN ORGANIZED HEALTH CARE EDUCATION/TRAINING PROGRAM

## 2022-07-10 PROCEDURE — 84100 ASSAY OF PHOSPHORUS: CPT | Performed by: INTERNAL MEDICINE

## 2022-07-10 PROCEDURE — 85025 COMPLETE CBC W/AUTO DIFF WBC: CPT

## 2022-07-10 PROCEDURE — 93970 EXTREMITY STUDY: CPT

## 2022-07-10 PROCEDURE — 82040 ASSAY OF SERUM ALBUMIN: CPT

## 2022-07-10 PROCEDURE — 80053 COMPREHEN METABOLIC PANEL: CPT

## 2022-07-10 PROCEDURE — 250N000013 HC RX MED GY IP 250 OP 250 PS 637: Performed by: STUDENT IN AN ORGANIZED HEALTH CARE EDUCATION/TRAINING PROGRAM

## 2022-07-10 PROCEDURE — 999N000248 HC STATISTIC IV INSERT WITH US BY RN

## 2022-07-10 PROCEDURE — 250N000011 HC RX IP 250 OP 636

## 2022-07-10 PROCEDURE — 250N000009 HC RX 250

## 2022-07-10 PROCEDURE — 99233 SBSQ HOSP IP/OBS HIGH 50: CPT | Performed by: INTERNAL MEDICINE

## 2022-07-10 PROCEDURE — 258N000003 HC RX IP 258 OP 636: Performed by: INTERNAL MEDICINE

## 2022-07-10 PROCEDURE — 36415 COLL VENOUS BLD VENIPUNCTURE: CPT

## 2022-07-10 PROCEDURE — 250N000013 HC RX MED GY IP 250 OP 250 PS 637: Performed by: INTERNAL MEDICINE

## 2022-07-10 PROCEDURE — 83735 ASSAY OF MAGNESIUM: CPT | Performed by: INTERNAL MEDICINE

## 2022-07-10 PROCEDURE — 250N000009 HC RX 250: Performed by: INTERNAL MEDICINE

## 2022-07-10 PROCEDURE — 250N000011 HC RX IP 250 OP 636: Performed by: INTERNAL MEDICINE

## 2022-07-10 RX ORDER — ENOXAPARIN SODIUM 100 MG/ML
80 INJECTION SUBCUTANEOUS 2 TIMES DAILY
Status: DISCONTINUED | OUTPATIENT
Start: 2022-07-11 | End: 2022-07-13 | Stop reason: HOSPADM

## 2022-07-10 RX ORDER — ENOXAPARIN SODIUM 100 MG/ML
40 INJECTION SUBCUTANEOUS ONCE
Status: COMPLETED | OUTPATIENT
Start: 2022-07-10 | End: 2022-07-10

## 2022-07-10 RX ORDER — ENOXAPARIN SODIUM 150 MG/ML
120 INJECTION SUBCUTANEOUS DAILY
Status: DISCONTINUED | OUTPATIENT
Start: 2022-07-10 | End: 2022-07-10

## 2022-07-10 RX ADMIN — DEXTROAMPHETAMINE SACCHARATE, AMPHETAMINE ASPARTATE, DEXTROAMPHETAMINE SULFATE AND AMPHETAMINE SULFATE 20 MG: 2.5; 2.5; 2.5; 2.5 TABLET ORAL at 07:57

## 2022-07-10 RX ADMIN — ACETAMINOPHEN 500 MG: 160 SOLUTION ORAL at 05:09

## 2022-07-10 RX ADMIN — CEFEPIME HYDROCHLORIDE 2 G: 2 INJECTION, POWDER, FOR SOLUTION INTRAVENOUS at 08:03

## 2022-07-10 RX ADMIN — FENTANYL TRANSDERMAL 1 PATCH: 25 PATCH, EXTENDED RELEASE TRANSDERMAL at 07:55

## 2022-07-10 RX ADMIN — PANTOPRAZOLE SODIUM 40 MG: 40 INJECTION, POWDER, FOR SOLUTION INTRAVENOUS at 07:58

## 2022-07-10 RX ADMIN — OXYCODONE HYDROCHLORIDE 10 MG: 5 SOLUTION ORAL at 00:07

## 2022-07-10 RX ADMIN — ENOXAPARIN SODIUM 40 MG: 40 INJECTION SUBCUTANEOUS at 18:51

## 2022-07-10 RX ADMIN — MAGNESIUM SULFATE HEPTAHYDRATE: 500 INJECTION, SOLUTION INTRAMUSCULAR; INTRAVENOUS at 22:08

## 2022-07-10 RX ADMIN — LIDOCAINE HYDROCHLORIDE 0.5 ML: 10 INJECTION, SOLUTION EPIDURAL; INFILTRATION; INTRACAUDAL; PERINEURAL at 00:22

## 2022-07-10 RX ADMIN — LORAZEPAM 1 MG: 0.5 TABLET ORAL at 16:10

## 2022-07-10 RX ADMIN — SUCRALFATE 1 G: 1 SUSPENSION ORAL at 22:04

## 2022-07-10 RX ADMIN — OXYCODONE HYDROCHLORIDE 10 MG: 5 SOLUTION ORAL at 12:52

## 2022-07-10 RX ADMIN — ENOXAPARIN SODIUM 120 MG: 120 INJECTION SUBCUTANEOUS at 10:41

## 2022-07-10 RX ADMIN — ERGOCALCIFEROL 50000 UNITS: 1.25 CAPSULE, LIQUID FILLED ORAL at 07:58

## 2022-07-10 RX ADMIN — ONDANSETRON 4 MG: 4 TABLET, ORALLY DISINTEGRATING ORAL at 06:48

## 2022-07-10 RX ADMIN — DIPHENHYDRAMINE HYDROCHLORIDE 50 MG: 50 CAPSULE ORAL at 05:09

## 2022-07-10 RX ADMIN — ONDANSETRON 4 MG: 4 TABLET, ORALLY DISINTEGRATING ORAL at 21:05

## 2022-07-10 RX ADMIN — CEFEPIME HYDROCHLORIDE 2 G: 2 INJECTION, POWDER, FOR SOLUTION INTRAVENOUS at 18:39

## 2022-07-10 RX ADMIN — OXYCODONE HYDROCHLORIDE 10 MG: 5 SOLUTION ORAL at 06:48

## 2022-07-10 RX ADMIN — VANCOMYCIN HYDROCHLORIDE 1500 MG: 10 INJECTION, POWDER, LYOPHILIZED, FOR SOLUTION INTRAVENOUS at 05:50

## 2022-07-10 RX ADMIN — VANCOMYCIN HYDROCHLORIDE 1500 MG: 10 INJECTION, POWDER, LYOPHILIZED, FOR SOLUTION INTRAVENOUS at 19:09

## 2022-07-10 RX ADMIN — CEFEPIME HYDROCHLORIDE 2 G: 2 INJECTION, POWDER, FOR SOLUTION INTRAVENOUS at 00:07

## 2022-07-10 RX ADMIN — OXYCODONE HYDROCHLORIDE 10 MG: 5 SOLUTION ORAL at 21:06

## 2022-07-10 RX ADMIN — DIPHENHYDRAMINE HYDROCHLORIDE 50 MG: 50 CAPSULE ORAL at 18:50

## 2022-07-10 ASSESSMENT — ACTIVITIES OF DAILY LIVING (ADL)
ADLS_ACUITY_SCORE: 24
DEPENDENT_IADLS:: TRANSPORTATION
ADLS_ACUITY_SCORE: 24

## 2022-07-10 NOTE — PROGRESS NOTES
I, Ab Medeiros, confirm that I saw the patient alongside the medical student. The plan below has been updated to reflect the most up to date care plan. All cares were discussed with the [facility] staff, Tino Chisholm.     Ab Medeiros MD PGY-2    [facility]    Progress Note - Medicine Service, [facility] TEAM 2       Date of Admission:  7/7/2022    Assessment & Plan            Parker Acevedo is a 51 year old male admitted on 7/7/2022. He presents with positive blood cultures, has a history of CLBSI, cardiomyopathy, chronic abdominal pain, GERD, RUE DVT, dysphagia, hyperlipidemia, malnutrition, TPN, short gut syndrome s/p baratric surgery with a chronic G tube vent, and is admitted for bacteremia and concern for encephalitis. S/p 3L NS, meropenem x1 day and vancomycin x1 daywith 50 mg benadryl pretreatment since admission. CXR in ED was concerning for bibasilar pneumonia.      Admitted to inpatient hospital service for ID workup and further treatment.    Changes today:  - monitoring 7/8, 7/9 cultures (NGTD)  - speech eval completed. Recommend regular diet with thin liquids. No skilled intervention required.      # Severe sepsis, improving  # Polymicrobial bacteremia  Likely secondary to recurrent CLBSI (Cm).    - ID following  - Vancomycin and meropenum started (7/7- )              - pretreat with benadryl for vanco infusion due to previous infusion reaction  - meropenem changed to cefepime 2g Q8 hrs (7/8- )  - 7/8 TTE shows no obvious valvular vegetations or abnormalities. Reduced EF 45-50%.   - daily CMP  - daily CBC w/ diff  - Cm line removed with IR 7/8  - Continue IV NS maintenance fluids       Cultures:  - 7/8, 79  cultures NGTD  - 7/7 UA shows mucous urine with ketones (10), albumin (10), urobilinogen (4), and small amount of blood  - 7/7 cultures positive for G+ bacilli, G+ cocci in pair and chains, G- bacilli, awaiting speciation  - 7/5 outside cultures  positive for G+ bacilli, G+ cocci in pair and chains, G- bacilli     #Bilateral upper extremity DVTs  Patient was admitted for a RUE DVT in February 2022 and treated with 120 mg Lovenox daily. On admission (7/7) Lovenox reduced to 40 mg secondary to concerns of pancytopenia. Exam on 7/10 showed bilateral upper extremity edema without redness concerning for DVT. Ultrasound showed non- occlusive DVTs of the R internal jugular, R subclavian, R axillary, and L medial subclavian veins.   - 1 mg/kg Lovenox BID  - monitor closely      # Encephalopathy, secondary to sepsis - resolved  - Infectious work-up as above     # Pancytopenia  Improving with sepsis treatment  - monitor closely due to Lovenox changes above.      #Sevre malnutrition secondary to chronic disease, short gut, gastroparesis  TPN dependent  History of short gut syndrome and several GI surgeries including bariatric surgery, appendectomy, and cholecystectomy. Patient receives TPN initially via G tube feed (failed, now only for venting) now via Azevedo. Additional history of dysphagia. Oral intake for pleasure per wife. TPN stopped 7/5 when patient became febrile. This is based on previous experience with central line infections.   - Nutrition following  - PPN during hospital course until central venous access replaced for TPN resumption   - IV NS for hydration  - Hopeful for azevedo replacement in the next few days pending negative blood cx     #GERD  #Chronic abdominal pain  Patient has history of peptic ulcers and GERD. Wife denies any knowledge of recent blood in the stool or recent vomiting. Patient on Lovenox therapy for recent DVT increasing his bleeding risk. Elevated LFTs and alk phos (7/8) concerning for hepatic ischemia but this is unlikely due to appropriate doppler on US and stable LFT trend on repeat CMP. LFTs and alk phos resolving 7/10.   - Trend LFTs and alk phos  - Continue pantoprazole as above  - Continue GI Cocktail     Imaging:  - 7/8 Abd  US shows hepatomegaly and nonspecific periportal echogenicity consistent with inflammation vs congestion.        Diet: parenteral nutrition - ADULT compounded formula  Regular Diet Adult Thin Liquids (level 0)  parenteral nutrition - ADULT compounded formula    DVT Prophylaxis: Enoxaparin (Lovenox) SQ  Sanchez Catheter: Not present  Fluids: IV NS  Central Lines: None  Cardiac Monitoring: None  Code Status: Full Code      Disposition Plan   Likely in several days once IV Abx duration known        The patient's care was discussed with the patient, nurse, and care coordinator    Marilou Mcgregor, MS3  Medicine Service, [facility] TEAM [address] [place] [address]  Securely message with the Vocera Web Console (learn more here)  Text page via [facility] Paging/Directory   Please see signed in provider for up to date coverage information        ______________________________________________________________________    Interval History   No acute overnight events. Patient is feeling much better today and complains only of minimal headaches and extremity swelling. Swelling further worked up and found to be the result of several upper extremity DVTs. Treatment with Lovenox underway. Discussed potential dispo pending IV abx and azevedo replacement with patient.      4 point ROS otherwise negative    Data reviewed today: I reviewed all medications, new labs and imaging results over the last 24 hours. I personally reviewed the no new imaging today.    Physical Exam   Vital Signs: Temp: 98  F (36.7  C) Temp src: Oral BP: 122/81 Pulse: 58   Resp: 16 SpO2: 98 % O2 Device: None (Room air)    Weight: 0 lbs 0 oz     General: AAOx3, NAD  HEENT: NC/AT, MMM, oropharynx clear, anicteric sclera, conjunctiva normal  CV: RRR, normal S1S2, no murmur, clicks, rubs appreciated  Resp: Non-labored respirations, good air movement, no wheezes, rhonchi  Abd: Soft, non-distended, slightly tender to palpation in RUQ  Extremities:  wwp,  Significant edema in bilateral upper extremities. Slight edema in lower extremities  Skin: Warm and dry, no obvious rashes or lesions aside from baseline abdominal bruising from Lovenox injection  Neuro: A/Ox3, No lateralizing symptoms or focal neurologic deficits, EOM intact  Psych: Appropriate mood    Data   Recent Labs   Lab 07/10/22  0519 07/09/22  0601 07/08/22  1858 07/08/22  1049 07/08/22  0459 07/07/22  2213   WBC 8.8 11.7*  --  12.7*   < > 8.4   HGB 10.9* 10.2*  --  10.1*   < > 10.5*   MCV 97 97  --  99   < > 96   * 97*  --  96*   < > 99*   INR  --  1.18*  --  1.42*  --  1.30*    142 142  --    < >  --    POTASSIUM 3.6 3.5 3.2*  --    < >  --    CHLORIDE 107 110* 109*  --    < >  --    CO2 25 25 27  --    < >  --    BUN 8.1 10.1 9.8  --    < >  --    CR 0.62* 0.64* 0.73  --    < >  --    ANIONGAP 8 7 6*  --    < >  --    SILAS 8.1* 7.9* 7.9*  --    < >  --    * 85 100*  --    < >  --    ALBUMIN 2.9* 2.7*  --  3.0*   < >  --    PROTTOTAL 5.4* 5.1*  --  5.2*   < >  --    BILITOTAL 0.3 0.3  --  0.3   < >  --    ALKPHOS 126 133*  --  164*   < >  --    * 134*  --  203*   < >  --    AST 25 62*  --  158*   < >  --     < > = values in this interval not displayed.     Internal Medicine Staff Addendum  Date of Service: 7/10/2022  I have seen and examined Parker Acevedo with the resident team, reviewed the data and discussed the plan of care with the patient and the care team on P&FC Rounds.  I agree with the above documentation     I discussed pt's care with bedside RN, case management/social work today.  I personally reviewed labs, medications and past 24 hr notes.  Assessment/Plan/Diagnoses: plan/dx as above, which contains my edits and reflects our joint medical decision-making.     Tino Chisholm MD  Internal Medicine/Pediatrics Hospitalist & Staff Physician   of Internal Medicine and Pediatrics  [facility]  Pager: [phone]

## 2022-07-10 NOTE — PROGRESS NOTES
07/10/22 0950   General Information   Onset of Illness/Injury or Date of Surgery 07/07/22   Referring Physician Tino Chisholm MD   Patient/Family Therapy Goal Statement (SLP) Pt wants to go home   Pertinent History of Current Problem Pt is a 51 year old male admitted on 7/7/2022. He presents with positive blood cultures, has a history of CLBSI, cardiomyopathy, chronic abdominal pain, GERD, RUE DVT, dysphagia, hyperlipidemia, malnutrition, TPN, short gut syndrome s/p baratric surgery with a chronic G tube vent, and is admitted for bacteremia and concern for encephalitis. S/p 3L NS, meropenem x1 day and vancomycin x1 daywith 50 mg benadryl pretreatment since admission. CXR in ED was concerning for bibasilar pneumonia. Clinical swallow eval completed per MD order.   General Observations Alert and pleasant, up IND in room   Past History of Dysphagia   Pt has no hx of oropharyngeal dysphagia, but endorses noctural reflux/aspiration. He reports he must sleep upright otherwise he wakes up coughing/choking.     Type of Evaluation   Type of Evaluation Swallow Evaluation   Oral Motor   Oral Musculature generally intact   Structural Abnormalities none present   Mucosal Quality good   Dentition (Oral Motor)   Dentition (Oral Motor) edentulous  (dentures not present at hospital)   Cough/Swallow/Gag Reflex (Oral Motor)   Comment, Cough/Swallow/Gag Reflex (Oral Motor) adequate   Vocal Quality/Secretion Management (Oral Motor)   Vocal Quality (Oral Motor) WNL   General Swallowing Observations   Respiratory Support (General Swallowing Observations) none   Current Diet/Method of Nutritional Intake (General Swallowing Observations, NIS) clear liquid diet   Swallowing Evaluation Clinical swallow evaluation   Clinical Swallow Evaluation   Feeding Assistance no assistance needed   Clinical Swallow Evaluation Textures Trialed thin liquids;solid foods   Clinical Swallow Eval: Thin Liquid Texture Trial   Mode of Presentation, Thin Liquids  cup;self-fed   Volume of Liquid or Food Presented 6oz thin water   Oral Phase of Swallow WFL   Pharyngeal Phase of Swallow intact   Diagnostic Statement No overt s/sx of aspiration   Clinical Swallow Evaluation: Solid Food Texture Trial   Mode of Presentation self-fed   Volume Presented 1 cracker   Oral Phase   (prolonged but functional mastication)   Pharyngeal Phase intact   Diagnostic Statement Prolonged but functional mastication 2/2 lack of dentition. No overt s/sx of aspiration.   Esophageal Phase of Swallow   Patient reports or presents with symptoms of esophageal dysphagia Yes   Esophageal comments Hx of GERD, nocturnal reflux   Swallowing Recommendations   Diet Consistency Recommendations regular diet;thin liquids (level 0)   Supervision Level for Intake patient independent   Recommended Feeding/Eating Techniques (Swallow Eval) maintain upright posture during/after eating for 30 minutes   Medication Administration Recommendations, Swallowing (SLP) as tolerated   Instrumental Assessment Recommendations instrumental evaluation not recommended at this time   Clinical Impression   Criteria for Skilled Therapeutic Interventions Met (SLP Eval) No problems identified which require skilled intervention   SLP Diagnosis Functional oropharyngeal swallow mechanism   Risks & Benefits of therapy have been explained evaluation/treatment results reviewed;care plan/treatment goals reviewed;risks/benefits reviewed;current/potential barriers reviewed;participants voiced agreement with care plan;participants included;patient   Clinical Impression Comments   Clinical swallow eval completed per MD order. Pt presents with a functional oropharyngeal swallow mechanism. Oral mech exam remarkable for lack of dentition; pt's dentures are at home. Assessed with thin liquids and cracker. Oral phase prolonged but functional for cracker d/t lack of teeth. Pharyngeal phase WFL, no overt s/sx of aspiration. Recommend regular diet/thin  liquids. Pt should remain upright for 30-45 minutes after meals d/t significant reflux. No additional SLP services indicated.     SLP Discharge Planning   SLP Discharge Recommendation home   SLP Rationale for DC Rec Functional swallow mechanism   SLP Brief overview of current status  Recommend regular diet/thin liquids. Pt should remain upright for 30-45 minutes after meals d/t significant reflux. No additional SLP services indicated.    Total Evaluation Time   Total Evaluation Time (Minutes) 17

## 2022-07-10 NOTE — PLAN OF CARE
Goal Outcome Evaluation:    Plan of Care Reviewed With: patient     Overall Patient Progress: no change      Neuro: Alert and oriented x4.     GI/: WDL. LBM 7/9    Diet: Clears -taking sips slowly. No coughing noted when drinking water.     Incisions/Drains: GT clamped. Pt unclamps GT when needed for venting.     IV Access: New L PIV. Pt declined a second PIV. Parenteral nutrition started at 84 ml/hr continuous.     Activity: Up independently. Pt goes out to smoke.     Pain: Oxycodone and tylenol with relief. Fentanyl patch in place.     New changes this shift: None.     Plan: Pain management, IV ABX, TPN. Continue with current POC.        unknown

## 2022-07-10 NOTE — PLAN OF CARE
Goal Outcome Evaluation: no change    Temp: 99.2  F (37.3  C) Temp src: Oral BP: 120/77 Pulse: 55   Resp: 16 SpO2: 98 % O2 Device: None (Room air)      Neuro: A&Ox4.   Cardiac: AVSS.   Respiratory: Sating 98% on RA.  GI/: Voiding, not saving. No BM   Diet/appetite: Tolerating regular diet with thin liquids. Eating fair  Activity: Up in dependently   Pain: Back and abdomen pain managed with oxycodone. Ativan given x1 for anxiety   Skin: Right arm swollen 3+ edema. Left foot in Cam boot   LDA's: PIV     Plan: Continue with POC. Notify primary team with changes.

## 2022-07-10 NOTE — PROGRESS NOTES
Care Management Initial Consult    General Information  Assessment completed with: PatientParker  Type of CM/SW Visit: Initial Assessment    Primary Care Provider verified and updated as needed: Yes   Readmission within the last 30 days: no previous admission in last 30 days      Reason for Consult: discharge planning  Advance Care Planning:         Communication Assessment  Patient's communication style: spoken language (English or Bilingual)    Hearing Difficulty or Deaf: no   Wear Glasses or Blind: no    Cognitive  Cognitive/Neuro/Behavioral: WDL                      Living Environment:   People in home: child(francheska), dependent, spouse  (20 year old child)  Current living Arrangements: house      Able to return to prior arrangements: yes     Family/Social Support:  Care provided by: self, homecare agency  Provides care for: no one  Marital Status:   Wife  Rose       Description of Support System: Supportive, Involved       Current Resources:   Patient receiving home care services: Yes  Skilled Home Care Services: Skilled Nursing  Community Resources: Infusion Services, Home Care  Equipment currently used at home: cane, straight  Supplies currently used at home: Other (TPN)    Employment/Financial:  Employment Status: disabled        Financial Concerns:    Lifestyle & Psychosocial Needs:  Social Determinants of Health     Tobacco Use: High Risk     Smoking Tobacco Use: Light Tobacco Smoker     Smokeless Tobacco Use: Never Used   Alcohol Use: Not At Risk     Frequency of Alcohol Consumption: Never     Average Number of Drinks: Patient refused     Frequency of Binge Drinking: Never   Financial Resource Strain: Medium Risk     Difficulty of Paying Living Expenses: Somewhat hard   Food Insecurity: No Food Insecurity     Worried About Running Out of Food in the Last Year: Never true     Ran Out of Food in the Last Year: Never true   Transportation Needs: No Transportation Needs     Lack of Transportation  (Medical): No     Lack of Transportation (Non-Medical): No   Physical Activity: Not on file   Stress: Not on file   Social Connections: Not on file   Intimate Partner Violence: Not At Risk     Fear of Current or Ex-Partner: No     Emotionally Abused: No     Physically Abused: No     Sexually Abused: No   Depression: Not at risk     PHQ-2 Score: 0   Housing Stability: Not on file       Functional Status:  Prior to admission patient needed assistance:   Dependent ADLs:: Independent  Dependent IADLs:: Transportation    Mental Health Status:  Mental Health Status: No Current Concerns       Chemical Dependency Status:  Chemical Dependency Status: No Current Concerns           Values/Beliefs:  Spiritual, Cultural Beliefs, Latter day Practices, Values that affect care: no             Additional Information:  CM assessment being completed due to elevated unplanned readmission risk score. RNCC spoke to patient via phone call. Introduced RNCC role and verified PCP, address and insurance info.     Parker Acevedo is a 51 year old male admitted on 7/7/2022. He presents with positive blood cultures, has a history of CLBSI, cardiomyopathy, chronic abdominal pain, GERD, RUE DVT, dysphagia, hyperlipidemia, malnutrition, TPN, short gut syndrome s/p baratric surgery with a chronic G tube vent, and is admitted for bacteremia and concern for encephalitis.     He lives in a house with his wife, Rose and 20 year old. He is independent with his ADLs and uses assistance from his wife as needed. He is currently open to [facility] and [facility] care agency for skilled nursing services. Writer placed resumption orders in AVS.    [facility]   Ph:  [phone]  Fax: [phone]     [facility]  Ph: [phone]    Skilled home care RN for initial home safety evaluation and 1-3 times a week to evaluate medication management, nutrition and hydration evaluation, endurance evaluation, and general status  evaluation after discharge from the acute care hospital setting.   Skilled home care RN to assist with home IV antibiotics per MD orders via picc line.  Medication labs and picc line cares per home care agency routine.   Skilled home care RN to assist with management and education reinforcement with home TPN via picc line.  TPN labs and picc line cares per home care agency routine.     Please fax discharge summary to [facility] at time of discharge.    No other needs anticipated at this time. RNCC will continue to follow.    Vicky Marroquin RN, MSN  Casual RN Care Coordinator  [facility]  Phone: [phone]

## 2022-07-10 NOTE — PROGRESS NOTES
[facility]    Progress Note - Medicine Service, [facility] TEAM 2       Date of Admission:  7/7/2022    Assessment & Plan            Parker Acevedo is a 51 year old male admitted on 7/7/2022. He presents with positive blood cultures, has a history of CLBSI, cardiomyopathy, chronic abdominal pain, GERD, RUE DVT, dysphagia, hyperlipidemia, malnutrition, TPN, short gut syndrome s/p baratric surgery with a chronic G tube vent, and is admitted for bacteremia and concern for encephalitis. S/p 3L NS, meropenem x1 day and vancomycin x1 daywith 50 mg benadryl pretreatment since admission. CXR in ED was concerning for bibasilar pneumonia.      Admitted to inpatient hospital service for ID workup and further treatment.    Changes today:  - monitoring cultures, speech eval     # Severe sepsis, improving  # Polymicrobial bacteremia  Likely secondary to recurrent CLBSI (Cm).    - ID following  - Vancomycin and meropenum started (7/7- )              - pretreat with benadryl for vanco infusion due to previous infusion reaction  - meropenem changed to cefepime 2g Q8 hrs (7/8- )  - 7/8 TTE shows no obvious valvular vegetations or abnormalities. Reduced EF 45-50%.   - daily CMP  - daily CBC w/ diff  - Cm line removed with IR 7/8  - Continue IV NS maintenance fluids       Cultures:  - 7/8, 79  cultures NGTD  - 7/7 UA shows mucous urine with ketones (10), albumin (10), urobilinogen (4), and small amount of blood  - 7/7 cultures positive for G+ bacilli, G+ cocci in pair and chains, G- bacilli, awaiting speciation  - 7/5 outside cultures positive for G+ bacilli, G+ cocci in pair and chains, G- bacilli     # Encephalopathy, secondary to sepsis - resolved  - Infectious work-up as above     # Pancytopenia  Improving with sepsis treatment     #Sevre malnutrition secondary to chronic disease, short gut, gastroparesis  TPN dependent  History of short gut syndrome and several GI surgeries  including bariatric surgery, appendectomy, and cholecystectomy. Patient receives TPN initially via G tube feed (failed, now only for venting) now via Cm. Additional history of dysphagia. Oral intake for pleasure per wife. TPN stopped 7/5 when patient became febrile. This is based on previous experience with central line infections.   - Nutrition following  - PPN during hospital course until central venous access replaced for TPN resumption   - IV NS for hydration     #GERD  #Chronic abdominal pain  Patient has history of peptic ulcers and GERD. Wife denies any knowledge of recent blood in the stool or recent vomiting. Patient on Lovenox therapy for recent DVT increasing his bleeding risk. Elevated LFTs and alk phos (7/8) concerning for hepatic ischemia but this is unlikely due to appropriate doppler on US and stable LFT trend on repeat CMP.     -   - Trend LFTs and alk phos  - Continue pantoprazole as above  - Continue GI Cocktail     Imaging:  - 7/8 Abd US shows hepatomegaly and nonspecific periportal echogenicity consistent with inflammation vs congestion.        Diet: parenteral nutrition - ADULT compounded formula  Advance Diet as Tolerated: Clear Liquid Diet    DVT Prophylaxis: Enoxaparin (Lovenox) SQ  Sanchez Catheter: Not present  Fluids: IV NS  Central Lines: None  Cardiac Monitoring: None  Code Status: Full Code      Disposition Plan   Likely in several days once IV Abx duration known        The patient's care was discussed with the patient, nurse, and care coordinator    Tino Chisholm MD  Medicine Service, [address]  Securely message with the Vocera Web Console (learn more here)  Text page via [facility] Paging/Directory   Please see signed in provider for up to date coverage information        ______________________________________________________________________    Interval History   No acute events.  Feeling better.     4 point ROS otherwise  negative    Data reviewed today: I reviewed all medications, new labs and imaging results over the last 24 hours. I personally reviewed the head CT image(s) showing no acute findings or concerning enhancement.    Physical Exam   Vital Signs: Temp: 98  F (36.7  C) Temp src: Oral BP: 122/81 Pulse: 58   Resp: 16 SpO2: 98 % O2 Device: None (Room air)    Weight: 0 lbs 0 oz  General: AAOx3, NAD  HEENT: NC/AT, MMM, oropharynx clear, anicteric sclera, conjunctiva normal  CV: RRR, normal S1S2, no murmur, clicks, rubs appreciated  Resp: Non-labored respirations, good air movement, no wheezes, rhonchi  Abd: Soft, non-distended, slightly tender to palpation in RUQ  Extremities: wwp,  no pedal edema  Skin: Warm and damp, no obvious rashes or lesions aside from baseline abdominal bruising from Lovenox injection  Neuro: A/Ox3, No lateralizing symptoms or focal neurologic deficits  Psych: Appropriate mood    Data   Recent Labs   Lab 07/10/22  0519 07/09/22  0601 07/08/22  1858 07/08/22  1049 07/08/22  0459 07/07/22  2213   WBC 8.8 11.7*  --  12.7*   < > 8.4   HGB 10.9* 10.2*  --  10.1*   < > 10.5*   MCV 97 97  --  99   < > 96   * 97*  --  96*   < > 99*   INR  --  1.18*  --  1.42*  --  1.30*    142 142  --    < >  --    POTASSIUM 3.6 3.5 3.2*  --    < >  --    CHLORIDE 107 110* 109*  --    < >  --    CO2 25 25 27  --    < >  --    BUN 8.1 10.1 9.8  --    < >  --    CR 0.62* 0.64* 0.73  --    < >  --    ANIONGAP 8 7 6*  --    < >  --    SILAS 8.1* 7.9* 7.9*  --    < >  --    * 85 100*  --    < >  --    ALBUMIN 2.9* 2.7*  --  3.0*   < >  --    PROTTOTAL 5.4* 5.1*  --  5.2*   < >  --    BILITOTAL 0.3 0.3  --  0.3   < >  --    ALKPHOS 126 133*  --  164*   < >  --    * 134*  --  203*   < >  --    AST 25 62*  --  158*   < >  --     < > = values in this interval not displayed.

## 2022-07-10 NOTE — PLAN OF CARE
Goal Outcome Evaluation:  Plan of Care Reviewed With: patient   Overall Patient Progress: no change.  /70 (BP Location: Right arm)   Pulse 58   Temp 98.3  F (36.8  C) (Oral)   Resp 16   SpO2 97%      Neuro: Alert and oriented x4.     GI/: WDL.     Diet: Clears late afternoon. Pt taking sips of water only. No coughing noted when drinking water.     Incisions/Drains: GT clamped most of shift. Pt unclamps GT when needed for venting.     IV Access: PIV x1. Pt declined a second PIV. Parenteral nutrition started at 84cc continuous.     Activity: Up independently. Pt goes out to smoke.     Pain: Oxycodone x1. Ativan for anxiety around 2100.     New changes this shift: Started clears - taking sips slowly.     Plan: Continue with current POC.

## 2022-07-11 LAB
ALBUMIN SERPL BCG-MCNC: 3 G/DL (ref 3.5–5.2)
ALP SERPL-CCNC: 104 U/L (ref 40–129)
ALT SERPL W P-5'-P-CCNC: 69 U/L (ref 10–50)
ANION GAP SERPL CALCULATED.3IONS-SCNC: 7 MMOL/L (ref 7–15)
AST SERPL W P-5'-P-CCNC: 15 U/L (ref 10–50)
BASOPHILS # BLD AUTO: 0 10E3/UL (ref 0–0.2)
BASOPHILS NFR BLD AUTO: 0 %
BILIRUB SERPL-MCNC: 0.2 MG/DL
BUN SERPL-MCNC: 11.3 MG/DL (ref 6–20)
CALCIUM SERPL-MCNC: 8.2 MG/DL (ref 8.6–10)
CHLORIDE SERPL-SCNC: 110 MMOL/L (ref 98–107)
CREAT SERPL-MCNC: 0.61 MG/DL (ref 0.67–1.17)
DEPRECATED HCO3 PLAS-SCNC: 27 MMOL/L (ref 22–29)
EOSINOPHIL # BLD AUTO: 0.2 10E3/UL (ref 0–0.7)
EOSINOPHIL NFR BLD AUTO: 3 %
ERYTHROCYTE [DISTWIDTH] IN BLOOD BY AUTOMATED COUNT: 14.7 % (ref 10–15)
GFR SERPL CREATININE-BSD FRML MDRD: >90 ML/MIN/1.73M2
GLUCOSE SERPL-MCNC: 104 MG/DL (ref 70–99)
HBV SURFACE AG SERPL QL IA: REACTIVE
HCT VFR BLD AUTO: 34 % (ref 40–53)
HGB BLD-MCNC: 10.9 G/DL (ref 13.3–17.7)
IMM GRANULOCYTES # BLD: 0.1 10E3/UL
IMM GRANULOCYTES NFR BLD: 1 %
INR PPP: 1.1 (ref 0.85–1.15)
LYMPHOCYTES # BLD AUTO: 2.2 10E3/UL (ref 0.8–5.3)
LYMPHOCYTES NFR BLD AUTO: 40 %
MAGNESIUM SERPL-MCNC: 2.1 MG/DL (ref 1.7–2.3)
MCH RBC QN AUTO: 30.8 PG (ref 26.5–33)
MCHC RBC AUTO-ENTMCNC: 32.1 G/DL (ref 31.5–36.5)
MCV RBC AUTO: 96 FL (ref 78–100)
MONOCYTES # BLD AUTO: 0.5 10E3/UL (ref 0–1.3)
MONOCYTES NFR BLD AUTO: 8 %
NEUTROPHILS # BLD AUTO: 2.6 10E3/UL (ref 1.6–8.3)
NEUTROPHILS NFR BLD AUTO: 48 %
NRBC # BLD AUTO: 0 10E3/UL
NRBC BLD AUTO-RTO: 0 /100
PHOSPHATE SERPL-MCNC: 3.7 MG/DL (ref 2.5–4.5)
PLATELET # BLD AUTO: 133 10E3/UL (ref 150–450)
POTASSIUM SERPL-SCNC: 3.7 MMOL/L (ref 3.4–5.3)
PREALB SERPL IA-MCNC: 11 MG/DL (ref 15–45)
PREALB SERPL IA-MCNC: 7 MG/DL (ref 15–45)
PROT SERPL-MCNC: 5.4 G/DL (ref 6.4–8.3)
RBC # BLD AUTO: 3.54 10E6/UL (ref 4.4–5.9)
SODIUM SERPL-SCNC: 144 MMOL/L (ref 136–145)
VANCOMYCIN SERPL-MCNC: 13.7 UG/ML
WBC # BLD AUTO: 5.5 10E3/UL (ref 4–11)

## 2022-07-11 PROCEDURE — 85004 AUTOMATED DIFF WBC COUNT: CPT

## 2022-07-11 PROCEDURE — 99222 1ST HOSP IP/OBS MODERATE 55: CPT | Mod: GC | Performed by: INTERNAL MEDICINE

## 2022-07-11 PROCEDURE — C9113 INJ PANTOPRAZOLE SODIUM, VIA: HCPCS

## 2022-07-11 PROCEDURE — 80202 ASSAY OF VANCOMYCIN: CPT | Performed by: INTERNAL MEDICINE

## 2022-07-11 PROCEDURE — 250N000011 HC RX IP 250 OP 636

## 2022-07-11 PROCEDURE — 36415 COLL VENOUS BLD VENIPUNCTURE: CPT | Performed by: INTERNAL MEDICINE

## 2022-07-11 PROCEDURE — 258N000003 HC RX IP 258 OP 636: Performed by: INTERNAL MEDICINE

## 2022-07-11 PROCEDURE — 250N000013 HC RX MED GY IP 250 OP 250 PS 637: Performed by: EMERGENCY MEDICINE

## 2022-07-11 PROCEDURE — 80053 COMPREHEN METABOLIC PANEL: CPT

## 2022-07-11 PROCEDURE — 99233 SBSQ HOSP IP/OBS HIGH 50: CPT | Performed by: INTERNAL MEDICINE

## 2022-07-11 PROCEDURE — 250N000013 HC RX MED GY IP 250 OP 250 PS 637: Performed by: STUDENT IN AN ORGANIZED HEALTH CARE EDUCATION/TRAINING PROGRAM

## 2022-07-11 PROCEDURE — 120N000002 HC R&B MED SURG/OB UMMC

## 2022-07-11 PROCEDURE — 84100 ASSAY OF PHOSPHORUS: CPT | Performed by: INTERNAL MEDICINE

## 2022-07-11 PROCEDURE — 83735 ASSAY OF MAGNESIUM: CPT | Performed by: INTERNAL MEDICINE

## 2022-07-11 PROCEDURE — 999N000248 HC STATISTIC IV INSERT WITH US BY RN

## 2022-07-11 PROCEDURE — 250N000011 HC RX IP 250 OP 636: Performed by: INTERNAL MEDICINE

## 2022-07-11 PROCEDURE — 250N000011 HC RX IP 250 OP 636: Performed by: STUDENT IN AN ORGANIZED HEALTH CARE EDUCATION/TRAINING PROGRAM

## 2022-07-11 PROCEDURE — 85610 PROTHROMBIN TIME: CPT | Performed by: INTERNAL MEDICINE

## 2022-07-11 PROCEDURE — 250N000013 HC RX MED GY IP 250 OP 250 PS 637: Performed by: INTERNAL MEDICINE

## 2022-07-11 PROCEDURE — 250N000009 HC RX 250: Performed by: INTERNAL MEDICINE

## 2022-07-11 PROCEDURE — 84134 ASSAY OF PREALBUMIN: CPT | Performed by: INTERNAL MEDICINE

## 2022-07-11 PROCEDURE — 99232 SBSQ HOSP IP/OBS MODERATE 35: CPT | Performed by: INTERNAL MEDICINE

## 2022-07-11 RX ADMIN — MAGNESIUM SULFATE HEPTAHYDRATE: 500 INJECTION, SOLUTION INTRAMUSCULAR; INTRAVENOUS at 22:15

## 2022-07-11 RX ADMIN — ONDANSETRON 4 MG: 4 TABLET, ORALLY DISINTEGRATING ORAL at 22:16

## 2022-07-11 RX ADMIN — ACETAMINOPHEN 500 MG: 160 SOLUTION ORAL at 09:12

## 2022-07-11 RX ADMIN — CEFEPIME HYDROCHLORIDE 2 G: 2 INJECTION, POWDER, FOR SOLUTION INTRAVENOUS at 01:16

## 2022-07-11 RX ADMIN — SUCRALFATE 1 G: 1 SUSPENSION ORAL at 08:49

## 2022-07-11 RX ADMIN — OXYCODONE HYDROCHLORIDE 10 MG: 5 SOLUTION ORAL at 15:22

## 2022-07-11 RX ADMIN — DIPHENHYDRAMINE HYDROCHLORIDE 50 MG: 50 CAPSULE ORAL at 17:41

## 2022-07-11 RX ADMIN — OXYCODONE HYDROCHLORIDE 10 MG: 5 SOLUTION ORAL at 03:17

## 2022-07-11 RX ADMIN — CEFEPIME HYDROCHLORIDE 2 G: 2 INJECTION, POWDER, FOR SOLUTION INTRAVENOUS at 16:19

## 2022-07-11 RX ADMIN — VANCOMYCIN HYDROCHLORIDE 1500 MG: 10 INJECTION, POWDER, LYOPHILIZED, FOR SOLUTION INTRAVENOUS at 18:22

## 2022-07-11 RX ADMIN — DEXTROAMPHETAMINE SACCHARATE, AMPHETAMINE ASPARTATE, DEXTROAMPHETAMINE SULFATE AND AMPHETAMINE SULFATE 20 MG: 2.5; 2.5; 2.5; 2.5 TABLET ORAL at 08:49

## 2022-07-11 RX ADMIN — DIPHENHYDRAMINE HYDROCHLORIDE 50 MG: 50 CAPSULE ORAL at 05:12

## 2022-07-11 RX ADMIN — ACETAMINOPHEN 500 MG: 160 SOLUTION ORAL at 15:22

## 2022-07-11 RX ADMIN — SUCRALFATE 1 G: 1 SUSPENSION ORAL at 12:26

## 2022-07-11 RX ADMIN — PANTOPRAZOLE SODIUM 40 MG: 40 INJECTION, POWDER, FOR SOLUTION INTRAVENOUS at 08:50

## 2022-07-11 RX ADMIN — VANCOMYCIN HYDROCHLORIDE 1500 MG: 10 INJECTION, POWDER, LYOPHILIZED, FOR SOLUTION INTRAVENOUS at 06:07

## 2022-07-11 RX ADMIN — OXYCODONE HYDROCHLORIDE 10 MG: 5 SOLUTION ORAL at 22:15

## 2022-07-11 RX ADMIN — OXYCODONE HYDROCHLORIDE 10 MG: 5 SOLUTION ORAL at 09:13

## 2022-07-11 RX ADMIN — SUCRALFATE 1 G: 1 SUSPENSION ORAL at 16:19

## 2022-07-11 RX ADMIN — ACETAMINOPHEN 500 MG: 160 SOLUTION ORAL at 22:16

## 2022-07-11 RX ADMIN — ONDANSETRON 4 MG: 4 TABLET, ORALLY DISINTEGRATING ORAL at 03:17

## 2022-07-11 RX ADMIN — LORAZEPAM 1 MG: 0.5 TABLET ORAL at 18:22

## 2022-07-11 RX ADMIN — ACETAMINOPHEN 500 MG: 160 SOLUTION ORAL at 03:24

## 2022-07-11 RX ADMIN — CEFEPIME HYDROCHLORIDE 2 G: 2 INJECTION, POWDER, FOR SOLUTION INTRAVENOUS at 08:49

## 2022-07-11 RX ADMIN — ENOXAPARIN SODIUM 80 MG: 80 INJECTION SUBCUTANEOUS at 08:49

## 2022-07-11 RX ADMIN — ENOXAPARIN SODIUM 80 MG: 80 INJECTION SUBCUTANEOUS at 22:16

## 2022-07-11 ASSESSMENT — ACTIVITIES OF DAILY LIVING (ADL)
ADLS_ACUITY_SCORE: 24

## 2022-07-11 NOTE — CONSULTS
Hematology Consult Note   Date of Service: 07/11/2022    Patient: Parker Acevedo  MRN: [id number]  Admission Date: 7/7/2022  Hospital Day # Hospital Day: 5  Primary Outpatient Hematologist: Dr. Paula Piper    Reason for Consult: Bilateral upper extremity DVTs    Assessment & Plan:   Parker Acevedo is a 51 year old male with a PMH of MDRO CLABSI, cardiomyopathy, h/o bariatric surgery c/b short gut syndrome and malnutrition on TPN, RUE provoked DVT, chronic abdominal pain, dysphagia, and GERD who was admitted on 7/7 for sepsis 2/2 polymicrobial bacteremia. Hematology was consulted for bilateral upper extremity DVTs.     Bilateral upper extremity provoked DVTs  His extension of his previous RUE DVT and new LUE DVT are likely provoked 2/2 sepsis, presence of a CVC, and gap in anticoagulation.  - Continue enoxaparin 80mg subcutaneous BID while inpatient  - Transition to enoxaparin 120mg subcutaneous once daily at time of discharge  - Lymphedema consult for RUE swelling  - Follow up in Hematology Clinic in 1 month as previously scheduled      Patient was seen and plan of care was discussed with attending physician Dr. Paula Piper.    We will sign off at this time.Please don't hesitate to contact the Fellow On-Call with questions.    Tereso Dove MD  Internal Medicine and Pediatrics, PGY2  [phone]    Attending Note:  I have reviewed the patient chart, and interviewed and examined the patient.  I agree with the assessment and plan. 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 Continue the enoxaparin 80 mg bid. Once he is ready for discharge, can change to enoxaparin 1.5 mg/kg  Daily for 6 months. He has chronic lymphedema of the Right arm, would benefit from lymphedema consult, possibly compression sleeve.   Palua Piper MD  Hematology      History of Present Illness:    Parker Acevedo is a 51 year old male with a PMH of MDRO CLABSI, cardiomyopathy, h/o bariatric surgery c/b short gut syndrome and  malnutrition on TPN, RUE provoked DVT, chronic abdominal pain, dysphagia, and GERD who was admitted on 7/7 for sepsis 2/2 polymicrobial bacteremia. Hematology was consulted for bilateral upper extremity DVTs.    He was initially diagnosed with provoked DVTs of his right axillary, cephalic, and internal jugular veins on 2/27/2022 2/2 his Cm catheter through which he was receiving TPN. Due to his history of short gut syndrome it was unclear how well he would absorb oral anticoagulants so he was started on enoxaparin 120mg daily. He was seen in Hematology clinic on 5/8/22 and bilateral upper extremity US at that time showed right partial occlusive subclavian DVT which was improved compared to 2/2022 US and the plan was for him to continue enoxaparin through at least 8/25/22, although he would likely continue to need prophylaxis given his ongoing need for central venous access for TPN.     His anticoagulation was initially held on admission, he was started on prophylactic dosing enoxaparin 7/9. On 7/10 he was noted to have RUE edema so bilateral upper extremity US on 7/10 which showed nonocclusive DVTs in the Right internal jugular/subclavian/axillar veins and left subclavian vein and he was started on therapeutic enoxaparin at 80mg subcutaneous BID.     Review of Systems: Pertinent positive and negative systems described in HPI; the remainder of the 14 systems are negative    Past Medical History:  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     Complication of anesthesia      Difficulty swallowing      Gastric ulcer, unspecified as acute or chronic, without mention of hemorrhage, perforation, or obstruction      Gastro-oesophageal reflux disease       Head injury      Hiatal hernia      Other bladder disorder      Other chronic pain      PONV (postoperative nausea and vomiting)      Severe malnutrition (H)     TPN     Short gut syndrome      Tobacco abuse        Past Surgical History:  Past Surgical History:   Procedure Laterality Date     AMPUTATION       APPENDECTOMY       BACK SURGERY  11/3/2014    curve in the spine     BIOPSY LYMPH NODE CERVICAL N/A 2/20/2015    Procedure: BIOPSY LYMPH NODE CERVICAL;  Surgeon: Baron Scanlon MD;  [facility]     CHOLECYSTECTOMY       COLONOSCOPY N/A 7/14/2021    Procedure: COLONOSCOPY;  Surgeon: Jimbo Estrada MD;  Location: [facility] OR     COLONOSCOPY N/A 4/13/2022    Procedure: COLONOSCOPY;  Surgeon: Jimbo Estrada MD;  Location: [facility] OR     DISCECTOMY, FUSION CERVICAL ANTERIOR ONE LEVEL, COMBINED N/A 2/15/2017    Procedure: COMBINED DISCECTOMY, FUSION CERVICAL ANTERIOR ONE LEVEL;  Surgeon: Darren Campos MD;  [facility]     ENDOSCOPIC INSERTION TUBE GASTROSTOMY  9/9/2013    Procedure: ENDOSCOPIC INSERTION TUBE GASTROSTOMY;;  Surgeon: Francis Vyas MD;  [facility]     ENDOSCOPIC ULTRASOUND UPPER GASTROINTESTINAL TRACT (GI)  4/29/2011    Procedure:ENDOSCOPIC ULTRASOUND UPPER GASTROINTESTINAL TRACT (GI); Both Procedures done Conjointly; Surgeon:NEREIDA HOUSER; [facility]     ENDOSCOPIC ULTRASOUND UPPER GASTROINTESTINAL TRACT (GI)  9/9/2013    Procedure: ENDOSCOPIC ULTRASOUND UPPER GASTROINTESTINAL TRACT (GI);  Endoscopic Ultrasound Guide Gastrostomy Tube Placement  C-arm;  Surgeon: Noe Lizarraga MD;  [facility]     ENDOSCOPIC ULTRASOUND UPPER GASTROINTESTINAL TRACT (GI) N/A 2/24/2021    Procedure: ENDOSCOPIC ULTRASOUND, ESOPHAGOSCOPY / UPPER GASTROINTESTINAL TRACT (GI), esophagastrogastroduodenoscopy;  Surgeon: Berny Bach MD;  [facility]     ENDOSCOPY  03/25/11    [facility]     ENDOSCOPY  08/04/09    [facility] [place]  [facility]     ENDOSCOPY  01/05/09    [facility]     ESOPHAGOSCOPY, GASTROSCOPY, DUODENOSCOPY (EGD), COMBINED  4/20/2011    Procedure:COMBINED ESOPHAGOSCOPY, GASTROSCOPY, DUODENOSCOPY (EGD); Surgeon:BLU VYAS; [facility]     ESOPHAGOSCOPY, GASTROSCOPY, DUODENOSCOPY (EGD), COMBINED  6/15/2011    Procedure:COMBINED ESOPHAGOSCOPY, GASTROSCOPY, DUODENOSCOPY (EGD); Surgeon:BLU VYAS; [facility]     ESOPHAGOSCOPY, GASTROSCOPY, DUODENOSCOPY (EGD), COMBINED  6/12/2013    Procedure: COMBINED ESOPHAGOSCOPY, GASTROSCOPY, DUODENOSCOPY (EGD);;  Surgeon: Blu yVas MD;  [facility]     ESOPHAGOSCOPY, GASTROSCOPY, DUODENOSCOPY (EGD), COMBINED  11/22/2013    Procedure: COMBINED ESOPHAGOSCOPY, GASTROSCOPY, DUODENOSCOPY (EGD);;  Surgeon: Blu Vyas MD;  [facility]     ESOPHAGOSCOPY, GASTROSCOPY, DUODENOSCOPY (EGD), COMBINED  4/30/2014    Procedure: COMBINED ESOPHAGOSCOPY, GASTROSCOPY, DUODENOSCOPY (EGD);  Surgeon: Blu Vyas MD;  [facility]     ESOPHAGOSCOPY, GASTROSCOPY, DUODENOSCOPY (EGD), COMBINED N/A 2/20/2015    Procedure: COMBINED ESOPHAGOSCOPY, GASTROSCOPY, DUODENOSCOPY (EGD), BIOPSY SINGLE OR MULTIPLE;  Surgeon: Baron Scanlon MD;  Location:  OR     ESOPHAGOSCOPY, GASTROSCOPY, DUODENOSCOPY (EGD), COMBINED N/A 9/30/2015    Procedure: COMBINED ESOPHAGOSCOPY, GASTROSCOPY, DUODENOSCOPY (EGD);  Surgeon: Blu Vyas MD;  [facility]     ESOPHAGOSCOPY, GASTROSCOPY, DUODENOSCOPY (EGD), COMBINED N/A 10/3/2019    Procedure: Upper Endoscopy;  Surgeon: Clif Morrow MD;  [facility]     GASTRECTOMY  6/22/2012    Procedure: GASTRECTOMY;  Open Approach, Excise Ulcers,Partial Gastrectomy, Esophagojejunostomy, Hiatal Hernia Repair, Extensive Lysis of Adhesions and Esaphagogastrodudenoscopy.;  Surgeon: Blu Vyas MD;  [facility]     GASTROJEJUNOSTOMY  08/26/09    Extensice enterolysis,  partial resect. jejunum, part. resect gastric pouch, gastrojejunostomy anastomosis     HC ESOPH/GAS REFLUX TEST W NASAL IMPED ELECTRODE  8/5/2013    Procedure: ESOPHAGEAL IMPEDENCE FUNCTION TEST 1 HOUR OR LESS;  Surgeon: Halie Lang MD;  [facility]     HEAD & NECK SURGERY  2/15/2017    C5-C6     HERNIA REPAIR  2006    Umbilical hernia     HERNIORRHAPHY HIATAL  6/22/2012    Procedure: HERNIORRHAPHY HIATAL;;  Surgeon: Francis Vyas MD;  [facility]     HERNIORRHAPHY INGUINAL  11/22/2013    Procedure: HERNIORRHAPHY INGUINAL;;  Surgeon: Francis Vyas MD;  [facility]     INSERT PICC LINE Right 12/19/2019    Procedure: Picc Placement;  Surgeon: Per Dumont PA-C;  [facility]     INSERT PICC LINE Right 2/21/2020    Procedure: INSERTION, PICC;  Surgeon: Per Dumont PA-C;  [facility]     INSERT PORT VASCULAR ACCESS Right 12/19/2017    Procedure: INSERT PORT VASCULAR ACCESS;  Right Chest Port Placement ;  Surgeon: Lisandro Alejandro PA-C;  [facility]     INSERT PORT VASCULAR ACCESS Right 8/2/2018    Procedure: INSERT PORT VASCULAR ACCESS;  Place single lumen tunneled central venous access catheter;  Surgeon: Guy Jamil PA-C;  [facility]     IR CVC TUNNEL PLACEMENT > 5 YRS OF AGE  8/7/2019     IR CVC TUNNEL PLACEMENT > 5 YRS OF AGE  4/14/2020     IR CVC TUNNEL PLACEMENT > 5 YRS OF AGE  8/3/2020     IR CVC TUNNEL PLACEMENT > 5 YRS OF AGE  9/4/2020     IR CVC TUNNEL PLACEMENT > 5 YRS OF AGE  2/5/2021     IR CVC TUNNEL PLACEMENT > 5 YRS OF AGE  3/23/2021     IR CVC TUNNEL PLACEMENT > 5 YRS OF AGE  1/13/2022     IR CVC TUNNEL PLACEMENT > 5 YRS OF AGE  5/12/2022     IR CVC TUNNEL REMOVAL RIGHT  10/1/2019     IR CVC TUNNEL REMOVAL RIGHT  7/30/2020     IR CVC TUNNEL REMOVAL RIGHT  9/2/2020     IR CVC TUNNEL REMOVAL RIGHT  2/3/2021     IR CVC TUNNEL REMOVAL RIGHT  3/19/2021     IR CVC TUNNEL REMOVAL RIGHT  1/10/2022     IR CVC TUNNEL REMOVAL  RIGHT  5/9/2022     IR CVC TUNNEL REVISION RIGHT  5/7/2021     IR FOLLOW UP VISIT OUTPATIENT  8/7/2019     IR PICC PLACEMENT > 5 YRS OF AGE  3/7/2019     IR PICC PLACEMENT > 5 YRS OF AGE  12/19/2019     IR PICC PLACEMENT > 5 YRS OF AGE  2/21/2020     LAPAROTOMY EXPLORATORY  11/22/2013    Procedure: LAPAROTOMY EXPLORATORY;  Exploratory Laparotomy, Upper Endoscopy, Left Inguinal Hernia Repair;  Surgeon: Francis Vyas MD;  [facility]     ORTHOPEDIC SURGERY       PICC INSERTION Right 03/16/2017    5fr DL BioFlo PICC, 42cm (3cm external) in the R medial brachial vein w/ tip in the SVC RA junction.     PICC INSERTION Left 09/23/2017    5fr DL BioFlo PICC, 45cm (1cm external) in the L basilic vein w/ tip in the SVC RA junction.     PICC INSERTION Right 05/16/2019    5Fr - 43cm, Medial brachial vein, low SVC     PICC INSERTION Right 10/02/2019    5Fr - 43cm (2cm external), basilic vein, low SVC     SHAYLEE EN Y BOWEL  2003     SOFT TISSUE SURGERY       THORACIC SURGERY       TONSILLECTOMY       TRANSESOPHAGEAL ECHOCARDIOGRAM INTRAOPERATIVE N/A 1/8/2019    Procedure: TRANSESOPHAGEAL ECHOCARDIOGRAM INTRAOPERATIVE;  Surgeon: GENERIC ANESTHESIA PROVIDER;  [facility]     ZZC GASTRIC BYPASS,OBESE<100CM SHAYLEE-EN-Y  2002    lost 300 pounds       Social History:  Social History     Socioeconomic History     Marital status:      Spouse name: Rose     Number of children: 1     Highest education level: GED or equivalent   Tobacco Use     Smoking status: Light Tobacco Smoker     Packs/day: 0.10     Years: 3.00     Pack years: 0.30     Types: Cigarettes     Smokeless tobacco: Never Used     Tobacco comment: 2/4/2021    smokes 3 cigarettes/day   Vaping Use     Vaping Use: Never used   Substance and Sexual Activity     Alcohol use: No     Comment: quit 2002     Drug use: No     Sexual activity: Yes     Partners: Female     Birth control/protection: None     Comment: no protection   Other Topics Concern      Parent/sibling w/ CABG, MI or angioplasty before 65F 55M? No     Social Determinants of Health     Financial Resource Strain: Medium Risk     Difficulty of Paying Living Expenses: Somewhat hard   Food Insecurity: No Food Insecurity     Worried About Running Out of Food in the Last Year: Never true     Ran Out of Food in the Last Year: Never true   Transportation Needs: No Transportation Needs     Lack of Transportation (Medical): No     Lack of Transportation (Non-Medical): No   Intimate Partner Violence: Not At Risk     Fear of Current or Ex-Partner: No     Emotionally Abused: No     Physically Abused: No     Sexually Abused: No        Family History  Family History   Problem Relation Age of Onset     Gastrointestinal Disease Mother         Crohns disease     Anxiety Disorder Mother      Thyroid Disease Mother         Grave's disease     Cancer Father         ear cancer-skin cancer/melanoma     Breast Cancer Maternal Grandmother      Macular Degeneration Maternal Grandfather      Anxiety Disorder Sister      Diabetes Maternal Uncle      Breast Cancer Other      Hypertension No family hx of      Hyperlipidemia No family hx of      Cerebrovascular Disease No family hx of      Prostate Cancer No family hx of      Depression No family hx of      Anesthesia Reaction No family hx of      Asthma No family hx of      Osteoporosis No family hx of      Genetic Disorder No family hx of      Obesity No family hx of      Mental Illness No family hx of      Substance Abuse No family hx of      Glaucoma No family hx of        Outpatient Medications:  No current facility-administered medications on file prior to encounter.  acetaminophen (TYLENOL) 32 mg/mL liquid, Take 15.65 mLs (500 mg) by mouth every 4 hours as needed for fever or mild pain  albuterol (PROAIR HFA/PROVENTIL HFA/VENTOLIN HFA) 108 (90 Base) MCG/ACT inhaler, Inhale 2 puffs into the lungs every 6 hours as needed  amphetamine-dextroamphetamine (ADDERALL) 20 MG tablet,  "TAKE ONE TABLET BY MOUTH ONCE DAILY  carvedilol (COREG) 6.25 MG tablet, Take 12.5 mg by mouth 2 times daily (with meals)   cyanocobalamin (CYANOCOBALAMIN) 1000 MCG/ML injection, INJECT 1 ML INTO THE MUSCLE EVERY 30 DAYS  enoxaparin ANTICOAGULANT (LOVENOX) 120 MG/0.8ML syringe, INJECT THE CONTENT OF ONE SYRINGE 0.8 MLS (120 MG) UNDER THE SKIN ONCE DAILY  EPINEPHrine (ANY BX GENERIC EQUIV) 0.3 MG/0.3ML injection 2-pack,   [place] [facility]NA[facility]ED PATIENT, Contact [facility] for patient specific medication information at [phone] on admission and discharge from the hospital.  Phones are answered 24 hours a day 7 days a week 365 days a year.    Providers - Choose \"CONTINUE HOME MED (no script)\" at discharge if patient treatment with home infusion will continue.  fentaNYL (DURAGESIC) 25 mcg/hr 72 hr patch, Place 1 patch onto the skin every 48 hours remove old patch. Fill 06/29/22 and start 07/01/22. 30 days for chronic pain  insulin syringe-needle U-100 (29G X 1/2\" 1 ML) 29G X 1/2\" 1 ML miscellaneous, Use to inject b12 every 30 days  lactated ringers infusion, Inject 1,000-2,000 mLs into the vein daily as needed  lidocaine (LIDODERM) 5 % patch, Place 1-2 patches onto the skin every 24 hours Wear for 12 hours, remove for 12 hours.  OK to cut to better fit to size.  LORazepam (ATIVAN) 1 MG tablet, TAKE 1 TABLET (1MG) BY MOUTH ONCE A DAY AS NEEDED FOR ANXIETY WITH TPN AND MEDICATIONS . 30 TO LAST 30 DAYS  naloxone (NARCAN) 4 MG/0.1ML nasal spray, Spray 1 spray (4 mg) into one nostril alternating nostrils once as needed for opioid reversal every 2-3 minutes until assistance arrives  nystatin (MYCOSTATIN) 875946 UNIT/GM external cream, APPLY TOPICALLY 2 TIMES DAILY  ondansetron (ZOFRAN-ODT) 8 MG ODT tab, DISSOLVE ONE TABLET ON TONGUE EVERY 8 HOURS AS NEEDED FOR NAUSEA  oxyCODONE (ROXICODONE) 5 MG/5ML solution, Take 5-10 mLs (5-10 mg) by mouth 4 times daily as needed for pain Max of 40mg/day. Put at " least 4 hours between doses. Fill 06/29/22 and start 07/01/22. 30 day supply for chronic pan  parenteral nutrition - PTA/DISCHARGE ORDER, Patient receives 2050 mL TPN every 14 hours through PICC at [facility].  polyethylene glycol (MIRALAX) 17 GM/Dose powder, Take 17 g by mouth daily  sucralfate (CARAFATE) 1 GM/10ML suspension, TAKE 10MLS  BY MOUTH FOUR TIMES A DAY AS NEEDED  vitamin D2 (ERGOCALCIFEROL) 97975 units (1250 mcg) capsule, TAKE 1 CAPSULE (50,000 UNITS) BY MOUTH ONCE A WEEK  bisacodyl (DULCOLAX) 5 MG EC tablet, Take as directed. One day prior to exam at 10:00am take 2 tablets  magnesium citrate solution, Take as directed. Two days prior to exam drink 10oz bottle of magnesium citrate at 4:00pm  pantoprazole (PROTONIX) 40 MG EC tablet, Take 1 tablet (40 mg) by mouth daily  polyethylene glycol (GOLYTELY) 236 g suspension, Take as directed. One day before your exam fill the first container with water. Cover and shake until mixed well. At 3:00pm drink one 8oz glass every 10-15 minutes until half of the first container is empty. Store the remainder in the refrigerator. At 8:00pm drink the second half of the first container until it is gone. Before you go to bed mix the second container with water and put in refrigerator. Six hours before your check in time drink one 8oz glass every 10-15 minutes until half of container is empty. Discard the remainder of solution.  [DISCONTINUED] NO ACTIVE MEDICATIONS, .         Physical Exam:    /81 (BP Location: Left arm)   Pulse 55   Temp 98  F (36.7  C) (Oral)   Resp 16   SpO2 99%   Gen: Well appearing, in NAD  HEENT: EOMI, PERRL, mmm, oropharynx clear  CV: Normal rate, regular rhythm. No m/r/g  Pulm: CTAB, no wheezing, normal work of breathing  Abd: Soft, nt/nd, no rebound/guarding  Ext: Warm and well perfused. RUE edema without skin changes  Skin: No rash, cyanosis or petechial lesion  Neuro: Alert and answering questions appropriately. Moving all  extremities without issue or focal neurologic deficits.     Labs & Studies: I personally reviewed the following studies:  ROUTINE LABS (Last four results):  CMP  Recent Labs   Lab 07/11/22  0744 07/10/22  0519 07/09/22  0601 07/08/22  1858 07/08/22  1049    140 142 142  --    POTASSIUM 3.7 3.6 3.5 3.2*  --    CHLORIDE 110* 107 110* 109*  --    CO2 27 25 25 27  --    ANIONGAP 7 8 7 6*  --    * 109* 85 100*  --    BUN 11.3 8.1 10.1 9.8  --    CR 0.61* 0.62* 0.64* 0.73  --    GFRESTIMATED >90 >90 >90 >90  --    SILAS 8.2* 8.1* 7.9* 7.9*  --    MAG 2.1 2.0 2.1  --  1.8   PHOS 3.7 2.5 2.4*  --  3.5   PROTTOTAL 5.4* 5.4* 5.1*  --  5.2*   ALBUMIN 3.0* 2.9* 2.7*  --  3.0*   BILITOTAL 0.2 0.3 0.3  --  0.3   ALKPHOS 104 126 133*  --  164*   AST 15 25 62*  --  158*   ALT 69* 102* 134*  --  203*     CBC  Recent Labs   Lab 07/11/22  0744 07/10/22  0519 07/09/22  0601 07/08/22  1049   WBC 5.5 8.8 11.7* 12.7*   RBC 3.54* 3.50* 3.27* 3.18*   HGB 10.9* 10.9* 10.2* 10.1*   HCT 34.0* 33.8* 31.8* 31.4*   MCV 96 97 97 99   MCH 30.8 31.1 31.2 31.8   MCHC 32.1 32.2 32.1 32.2   RDW 14.7 14.8 14.9 15.0   * 124* 97* 96*     INR  Recent Labs   Lab 07/11/22 0744 07/09/22  0601 07/08/22  1049 07/07/22  2213   INR 1.10 1.18* 1.42* 1.30*

## 2022-07-11 NOTE — PROGRESS NOTES
[facility]    Progress Note - Medicine Service, MAROON TEAM 2       Date of Admission:  7/7/2022    Assessment & Plan          Parker Acevedo is a 51 year old male with history of CLBSI, cardiomyopathy, chronic abdominal pain, GERD, RUE DVT, dysphagia, hyperlipidemia, malnutrition, TPN, short gut syndrome s/p baratric surgery with a chronic G tube vent admitted for sepsis from acute line infection. Patient is now s/p Cm line removal by IR (7/8) with clinical and vital improvement on IV cefepime and vancomycin.      Patient remains inpatient while awaiting Cm line replacement on 7/12 with plans for continued IV antibiotics until 7/22 per [facility] ID.     Changes today:  - Cultures clear  x3 days  - IR consult for Cm placement  - Antibiotic duration until 7/22  - continue DVT treatment  - TPN for severe malnutrition     # Severe sepsis, improving  # Polymicrobial bacteremia  Likely secondary to recurrent CLBSI (Cm), s/p Cm removal by IR on 7/8.   - 7/12 Cm replacement given 72 hours of clear cultures, antibiotics as below  - 7/8 TTE shows no obvious valvular vegetations or abnormalities. Reduced EF 45-50%.     Antibiotics  - Cefepime (7/8-7/22)  - Vancomycin (7/8-7/22)  - Meropenem (7/7-7/8), empiric      Cultures:  - 7/8, 7/9  cultures NGTD  - 7/7 UA shows mucous urine with ketones (10), albumin (10), urobilinogen (4), and small amount of blood  - 7/7 cultures positive for enterobacter cloacae, bacillus cereus, lactococcus  - 7/5 outside cultures positive for enterobacter cloacae, bacillus cereus, lactococcus    - resistant to Ampicillin, Unasyn and Penicillin     # Bilateral upper extremity DVTs  Patient was admitted for a RUE DVT in February 2022 and treated with 120 mg Lovenox daily. On admission (7/7) Lovenox reduced to 40 mg in setting of thrombocytopenia. Exam on 7/10 showed bilateral upper extremity edema without redness concerning for  DVT. Ultrasound showed non- occlusive DVTs of the R internal jugular, R subclavian, R axillary, and L medial subclavian veins.   - 1 mg/kg Lovenox BID per hematology, continue for 3 months on discharge    # Encephalopathy, secondary to sepsis - resolved  - Infectious work-up as above      #Severe malnutrition secondary to chronic disease, short gut, gastroparesis  TPN dependent  History of short gut syndrome and several GI surgeries including bariatric surgery, appendectomy, and cholecystectomy. Patient receives TPN initially via G tube feed (failed, now only for venting) now via Cm. Additional history of dysphagia. Oral intake for pleasure per wife. TPN stopped 7/5 when patient became febrile. This is based on previous experience with central line infections.   - Nutrition following  - PPN during hospital course until central venous access replaced for TPN resumption   - IV NS for hydration     #GERD  #Chronic abdominal pain  Patient has history of peptic ulcers and GERD. Wife denies any knowledge of recent blood in the stool or recent vomiting. Patient on Lovenox therapy for recent DVT increasing his bleeding risk. Elevated LFTs and alk phos (7/8) concerning for hepatic ischemia but this is unlikely due to appropriate doppler on US and stable LFT trend on repeat CMP. LFTs and alk phos resolving 7/10.   - Trend LFTs and alk phos  - Continue pantoprazole as above  - Continue GI Cocktail     Imaging    - 7/8 Abd US w/ hepatomegaly and nonspecific periportal echogenicity c/w  inflammation vs congestion.     # Pancytopenia, improved       Diet: Regular Diet Adult Thin Liquids (level 0)  parenteral nutrition - ADULT compounded formula  NPO per Anesthesia Guidelines for Procedure/Surgery Except for: Meds  parenteral nutrition - ADULT compounded formula  NPO per Anesthesia Guidelines for Procedure/Surgery Except for: Meds    DVT Prophylaxis: Enoxaparin (Lovenox) SQ  Sanchez Catheter: Not present  Fluids: IV NS  Central  Lines: None  Cardiac Monitoring: None  Code Status: Full Code      Disposition Plan     The patient's care was discussed with the patient, nurse, and care coordinator    Ab Medeiros MD PGY2  Medicine Service, [facility] TEAM [address] [place] [address]  Securely message with the Vocera Web Console (learn more here)  Text page via [facility] Paging/Directory   Please see signed in provider for up to date coverage information        ______________________________________________________________________    Interval History   NAEON. Pain well-controlled. Appropriate oral intake with bowel movements and urine output. Agreeable to care plan. Family updated by phone.     4 point ROS otherwise negative    Data reviewed today: I reviewed all medications, new labs and imaging results over the last 24 hours. I personally reviewed the no new imaging today.    Physical Exam   Vital Signs: Temp: 98.8  F (37.1  C) Temp src: Oral BP: 122/70 Pulse: 67   Resp: 16 SpO2: 95 % O2 Device: None (Room air)    Weight: 178 lbs 12.69 oz     General: AAOx3, NAD  HEENT: NC/AT, MMM, oropharynx clear, anicteric sclera, conjunctiva normal  CV: RRR, normal S1S2, no murmur, clicks, rubs appreciated  Resp: Non-labored respirations, good air movement, no wheezes, rhonchi  Abd: Soft, non-distended, slightly tender to palpation in RUQ  Extremities: wwp,  Significant edema in bilateral upper extremities. Slight edema in lower extremities  Skin: Warm and dry, no obvious rashes or lesions aside from baseline abdominal bruising from Lovenox injection  Neuro: A/Ox3, No lateralizing symptoms or focal neurologic deficits, EOM intact  Psych: Appropriate mood    Data   Recent Labs   Lab 07/11/22  0744 07/10/22  0519 07/09/22  0601 07/08/22  1858 07/08/22  1049   WBC 5.5 8.8 11.7*  --  12.7*   HGB 10.9* 10.9* 10.2*  --  10.1*   MCV 96 97 97  --  99   * 124* 97*  --  96*   INR 1.10  --  1.18*  --  1.42*    140 142   < >  --     POTASSIUM 3.7 3.6 3.5   < >  --    CHLORIDE 110* 107 110*   < >  --    CO2 27 25 25   < >  --    BUN 11.3 8.1 10.1   < >  --    CR 0.61* 0.62* 0.64*   < >  --    ANIONGAP 7 8 7   < >  --    SILAS 8.2* 8.1* 7.9*   < >  --    * 109* 85   < >  --    ALBUMIN 3.0* 2.9* 2.7*  --  3.0*   PROTTOTAL 5.4* 5.4* 5.1*  --  5.2*   BILITOTAL 0.2 0.3 0.3  --  0.3   ALKPHOS 104 126 133*  --  164*   ALT 69* 102* 134*  --  203*   AST 15 25 62*  --  158*    < > = values in this interval not displayed.       Internal Medicine Staff Addendum  Date of Service: 7/11/2022  I have seen and examined Parker Acevedo with the resident team, reviewed the data and discussed the plan of care with the patient and the care team on P&FC Rounds.  I agree with the above documentation     I discussed pt's care with bedside RN, case management/social work today.  I personally reviewed labs, medications and past 24 hr notes.  Assessment/Plan/Diagnoses: plan/dx as above, which contains my edits and reflects our joint medical decision-making.     Tino Chisholm MD  Internal Medicine/Pediatrics Hospitalist & Staff Physician   of Internal Medicine and Pediatrics  [facility]  Pager: [phone]

## 2022-07-11 NOTE — PROGRESS NOTES
General Infectious Disease Service - Yellow Team - Sign Off Note    Patient:  Parker Acevedo, Date of birth 1970, Medical record number [id number]  Date of Admission: 7/7/2022  Date of Visit:  7/11/2022         Assessment and Recommendations:   Problem List:    1. CLABSI, polymicrobial with Enterobacter, Strep species, Bacillus so far  2. Sepsis, encephalopathy  3. Possible aspiration pneumonia, high risk given AMS on presentation  4. 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 h/o recurrent and frequent CLABSI  6. h/o R subcalvian vein thrombus  7. Allergies listed to penicillins, TMP-SMX, doxycycline, vancomycin (tolerates slow infusion and premedication)       Discussion:    Mr. Parker Acevedo51 yo M with a h/o prior bariatric surgery on chronic TPN c/b short-gut syndrome and frequent hospitalizations for recurrent CLABSI that presented to ED on 7/7/22 with fevers and concern for a new line (R Cm) infection. Blood cultures were repeated on arrival to ED. Patient arrived incoherent and disoriented though afebrile and hemodynamically stable. No leukocytosis, CRP elevated. He was started on empiric antibiotics, including vancomycin and meropenem. Blood cultures from 7/5 and 7/7 polymicrobial; with  Enterobacter (sensitive to cefepime), Lactococcus lactis (sensitive to penicillin and vancomycin) and Bacillus cereus sensitive to vancomycin. Source of bacteremia likely new CLABSI so the Cm catheter was removed on 7/8/22. Blood cultures are negative since 7/8. He is afebrile since 7/9. Meropenem was narrowed to cefepime on 7/8/22 (covering the Enterobacter) and he continues to be on vancomycin (coverine the Bacillus cereus and Lactococcus). TTE was negative for vegetations.           Recommendations:    1. Please continue cefepime and vancomycin for Enterobacter, Bacillus cereus and Lactococcus bacteremia (line related - Cm catheter removed on 7/8/22). Duration will  be 14 days from first negative blood culture -> 7/8/22-7/22/22. This is a hard stop.    2. New central line can be placed since the patient has cleared blood cultures for 72h                3.  While on antibiotics, please obtain at least weekly CBC,  CMP and CRP (daily CBC and BMP while in hospital). Patient will also need vancomycin trough level monitoring according to pharmacy protocol and pharmacy support for the duration of antimicrobial treatment. The results can be followed by primary care provider or primary team. We can be contacted if abnormal results.      4. I do not anticipate need for follow up in ID clinic if continued improvement     5. I will sign off at this moment. Please see sign off recommendations above. Please call us back if any question or need. Please call us back if any new positivity from blood cultures      Sandra Horne MD  Date of Service: 07/11/22  Pager: [phone]           Physical Exam:   /81 (BP Location: Left arm)   Pulse 55   Temp 98  F (36.7  C) (Oral)   Resp 16   Wt 81.1 kg (178 lb 12.7 oz)   SpO2 99%   BMI 24.94 kg/m         Exam:  GENERAL:  Well-developed, well-nourished, not in acute distress.   HEAD: Normocephalic and atraumatic  ENT:  No hearing impairment, oral mucous membranes moist  EYES:  Eyes grossly normal to inspection, PERRL and conjunctivae and sclerae normal   LUNGS:  Clear to auscultation - no rales, rhonchi or wheezes  CARDIOVASCULAR:  Regular rate and rhythm, normal S1 S2  ABDOMEN:  Soft, nontender  EXT: Extremities warm and without edema.  MS: No gross musculoskeletal defects noted, no edema  SKIN:  No acute rashes or suspicious lesions  NEUROLOGIC:  Grossly nonfocal. Normal strength and tone, mentation intact and speech normal  PSYCHIATRIC: Mood stable, mentation appears normal, affect normal           Laboratory Data:     Creatinine   Date Value Ref Range Status   07/11/2022 0.61 (L) 0.67 - 1.17 mg/dL Final   07/10/2022 0.62 (L) 0.67 - 1.17  mg/dL Final   07/09/2022 0.64 (L) 0.67 - 1.17 mg/dL Final   07/08/2022 0.73 0.67 - 1.17 mg/dL Final   07/08/2022 0.79 0.67 - 1.17 mg/dL Final   06/29/2021 0.69 0.66 - 1.25 mg/dL Final   06/14/2021 0.82 0.66 - 1.25 mg/dL Final   06/09/2021 0.76 0.66 - 1.25 mg/dL Final   06/01/2021 0.60 (L) 0.66 - 1.25 mg/dL Final   05/25/2021 0.81 0.66 - 1.25 mg/dL Final     WBC   Date Value Ref Range Status   06/29/2021 6.9 4.0 - 11.0 10e9/L Final   06/14/2021 8.1 4.0 - 11.0 10e9/L Final   06/09/2021 7.5 4.0 - 11.0 10e9/L Final   06/01/2021 7.3 4.0 - 11.0 10e9/L Final   05/25/2021 6.1 4.0 - 11.0 10e9/L Final     WBC Count   Date Value Ref Range Status   07/11/2022 5.5 4.0 - 11.0 10e3/uL Final   07/10/2022 8.8 4.0 - 11.0 10e3/uL Final   07/09/2022 11.7 (H) 4.0 - 11.0 10e3/uL Final   07/08/2022 12.7 (H) 4.0 - 11.0 10e3/uL Final   07/08/2022 14.5 (H) 4.0 - 11.0 10e3/uL Final     Hemoglobin   Date Value Ref Range Status   07/11/2022 10.9 (L) 13.3 - 17.7 g/dL Final   06/29/2021 12.1 (L) 13.3 - 17.7 g/dL Final     Platelet Count   Date Value Ref Range Status   07/11/2022 133 (L) 150 - 450 10e3/uL Final   06/29/2021 178 150 - 450 10e9/L Final     Lab Results   Component Value Date     07/11/2022    BUN 11.3 07/11/2022    CO2 27 07/11/2022     CRP Inflammation   Date Value Ref Range Status   07/07/2022 47.70 (H) <5.00 mg/L Final   05/07/2022 34.0 (H) 0.0 - 8.0 mg/L Final   03/15/2022 <2.9 0.0 - 8.0 mg/L Final   02/23/2022 <2.9 0.0 - 8.0 mg/L Final   01/08/2022 52.0 (H) 0.0 - 8.0 mg/L Final   11/16/2021 <2.9 0.0 - 8.0 mg/L Final   06/29/2021 <2.9 0.0 - 8.0 mg/L Final   06/14/2021 <2.9 0.0 - 8.0 mg/L Final   06/09/2021 <2.9 0.0 - 8.0 mg/L Final   03/25/2021 9.5 (H) 0.0 - 8.0 mg/L Final   03/22/2021 38.0 (H) 0.0 - 8.0 mg/L Final           Pertinent Recent Microbiology Data:   No results for input(s): CULT, SDES in the last 168 hours.         Imaging:     Recent Results (from the past 48 hour(s))   US Upper Extremity Venous Duplex Bilat    Result Value    Radiologist flags DVT (Urgent)    Narrative    EXAMINATION: DOPPLER VENOUS ULTRASOUND OF BILATERAL UPPER EXTREMITIES,  7/10/2022 9:15 AM     COMPARISON: Left upper terminate venous ultrasound 5/9/2022    HISTORY: Increased arm swelling, recent DVT    TECHNIQUE:  Gray-scale evaluation with compression, spectral flow, and  color Doppler assessment of the deep venous system of both upper  extremities.    FINDINGS:  Nonocclusive thrombus in the right internal jugular vein with patent  right to left collateral. Nonocclusive thrombus in the right  subclavian and right axillary veins. The right brachial, cephalic,  basilic veins are fully compressible. Scattered right upper extremity  edema.    The left internal jugular vein is fully compressible with normal color  Doppler flow. Nonocclusive thrombus in the left medial subclavian  vein. The left innominate, mid and lateral subclavian, and axillary  veins are patent. The left brachial, basilic, and cephalic veins in  the forearm are fully compressible. The cephalic vein in the upper arm  is not seen.      Impression    IMPRESSION:  1. Nonocclusive deep venous thrombosis in the right internal jugular,  right subclavian, and right axillary veins.  2. Nonocclusive deep venous thrombosis in the left medial subclavian  vein.      [Urgent Result: DVT]    Finding was identified on 7/10/2022 10:11 AM.     Dr. Medeiros was contacted by Dr. Garcia at 7/10/2022 10:41 AM and  verbalized understanding of the urgent finding.      I have personally reviewed the examination and initial interpretation  and I agree with the findings.    MEGAN CANALES MD         SYSTEM ID:  [id number]

## 2022-07-11 NOTE — PLAN OF CARE
Blood pressure 122/70, pulse 67, temperature 98.8  F (37.1  C), temperature source Oral, resp. rate 16, weight 81.1 kg (178 lb 12.7 oz), SpO2 95 %.    ORIENTATION: Alert and oriented by 4, able to make needs known.    PAIN: Mild complaints of pain-- controlled well with PRN oxy and tylenol.    SKIN: WDL ex; bruising in lower abdomen r/t blood thinner    LINES/DRAINS: Pt has G tube site-- used to vent occasionally, pt takes care of when it needs to be vented. L PIV, intermittently infusing IV antibiotics and TKO.    TRANSFERS: IND    CARDIAC: WDL    RESPIRATORY: WDL    LABS/IMAGING: WDL            SUMMARY OF SHIFT: No medical changes from 9934-6501. RUE blood clot remains, extremity is +2 edema but remained the same throughout shift, team aware. Cm line replacement to take place tomorrow in IR. Pt to discharge 7/12 or 7/13, but is based on how tomorrows placement goes.     PRN ativan administered x 1      Pt and significant other aware of the plan for tomorrow and discharge.

## 2022-07-11 NOTE — CONSULTS
Interventional Radiology Consult Service Note    Patient is on IR schedule 7/12 for a DL TCVC placement, jolly.   Labs WNL for procedure. COVID neg.    Orders for NPO have been entered.   Consent will be done prior to procedure.     Please contact the IR [place] RN at [phone] for estimated time of procedure.     Case discussed with Dr. Fitch from IR and Dr. Medeiros. This is a 51 year old male with a history of cardiomyopathy, chronic abdominal pain, GERD, RUE DVT, dysphagia, hyperlipidemia, malnutrition, short gut syndrome s/p baratric surgery with a chronic G tube vent and TPN dependence via CVC with frequent hospitalizations for recurrent CLABSI who presented to the ED on 7/7/2022 with fevers and AMS with concern for infection. Reportedly BCs were drawn 7/6 at home and returned positive 7/7 for several organisms. IR removed the RIJ TCVC 7/8. Pt is now s/p 72 hr line holiday and IR is consulted for DL TCVC placement.    Expected date of discharge: TBD    Vitals:   /81 (BP Location: Left arm)   Pulse 55   Temp 98  F (36.7  C) (Oral)   Resp 16   SpO2 99%     Pertinent Labs:     Lab Results   Component Value Date    WBC 5.5 07/11/2022    WBC 8.8 07/10/2022    WBC 11.7 (H) 07/09/2022    WBC 6.9 06/29/2021    WBC 8.1 06/14/2021    WBC 7.5 06/09/2021       Lab Results   Component Value Date    HGB 10.9 07/11/2022    HGB 10.9 07/10/2022    HGB 10.2 07/09/2022    HGB 12.1 06/29/2021    HGB 12.0 06/14/2021    HGB 13.3 06/09/2021       Lab Results   Component Value Date     07/11/2022     07/10/2022    PLT 97 07/09/2022     06/29/2021     06/14/2021     06/09/2021       Lab Results   Component Value Date    INR 1.10 07/11/2022    INR 1.07 05/07/2021    PTT 39 (H) 07/08/2022    PTT 26 07/22/2019       Lab Results   Component Value Date    POTASSIUM 3.7 07/11/2022    POTASSIUM 3.6 06/29/2022    POTASSIUM 3.5 06/29/2021        SAILAJA Cordova CNP  Interventional  Radiology  Pager: [phone]

## 2022-07-11 NOTE — PHARMACY-VANCOMYCIN DOSING SERVICE
Pharmacy Vancomycin Note  Date of Service 2022  Patient's  1970   51 year old, male    Indication: Bacteremia (CLABSI), polymicrobial (Enterobacter, Bacillus cereus and Lactococcus)  Day of Therapy: 5  Current vancomycin regimen:  1500 mg IV q12h  Current vancomycin monitoring method: AUC  Current vancomycin therapeutic monitoring goal: 400-600 mg*h/L    InsightRX Prediction of Current Vancomycin Regimen  Regimen: 1500 mg IV every 12 hours.  Exposure target: AUC24 (range)400-600 mg/L.hr   AUC24,ss: 460 mg/L.hr  Probability of AUC24 > 400: 82 %  Ctrough,ss: 11.6 mg/L  Probability of Ctrough,ss > 20: 0 %  Probability of nephrotoxicity (Lodise CARMELA ): 7 %    Creatinine for last 3 days  2022:  6:58 PM Creatinine 0.73 mg/dL  2022:  6:01 AM Creatinine 0.64 mg/dL  7/10/2022:  5:19 AM Creatinine 0.62 mg/dL  2022:  7:44 AM Creatinine 0.61 mg/dL  Current estimated CrCl = Estimated Creatinine Clearance: 168 mL/min (A) (based on SCr of 0.61 mg/dL (L)).    Recent Vancomycin Levels (past 3 days)  2022:  2:34 PM Vancomycin 10.1 ug/mL  2022:  4:29 PM Vancomycin 13.7 ug/mL (10.5hr post dose)    Vancomycin IV Administrations (past 72 hours)                   vancomycin 1500 mg in 0.9% NaCl 250 ml intermittent infusion 1,500 mg (mg) 1,500 mg New Bag 22 0607     1,500 mg New Bag 07/10/22 1909     1,500 mg New Bag  0550     1,500 mg New Bag 22 1723    vancomycin 1250 mg in 0.9% NaCl 250 mL intermittent infusion 1,250 mg (mg) 1,250 mg New Bag 22 0550                Nephrotoxins and other renal medications (From now, onward)    Start     Dose/Rate Route Frequency Ordered Stop    22 1700  vancomycin 1500 mg in 0.9% NaCl 250 ml intermittent infusion 1,500 mg         1,500 mg  over 90 Minutes Intravenous EVERY 12 HOURS 22 1637               Contrast Orders - past 72 hours (72h ago, onward)    None          Interpretation of levels and current regimen:  Vancomycin level  is reflective of -600    Has serum creatinine changed greater than 50% in last 72 hours: No    Urine output: several occurrences each day (not quantifying)    Renal Function: Stable      Plan:  1. Continue Current Dose  2. Vancomycin monitoring method: AUC  3. Vancomycin therapeutic monitoring goal: 400-600 mg*h/L  4. Pharmacy will check vancomycin levels as appropriate in 3-5 Days.  5. Serum creatinine levels will be ordered daily for the first week of therapy and at least twice weekly for subsequent weeks.    Perla Davila, Pharm.D., [facility]  Pager [phone]

## 2022-07-11 NOTE — PLAN OF CARE
Temp: 99.2  F (37.3  C) Temp src: Oral BP: 120/77 Pulse: 55   Resp: 16 SpO2: 98 % O2 Device: None (Room air)       NEURO: A&Ox4  RESPIRATORY: LSC, denies SOB. sats 98% on RA.   CARDIAC: vss, denies cardiac chest pain   GI/: voiding not saving. No BM this shift  DIET: regular. PPN @ 84ml/hr.   PAIN/NAUSEA: pain controlled w/ prn oxycodone x2 and prn tylenol x1. Prn zofran x2.   INCISION/DRAINS/SKIN: R arm edema, elevated. using LLE cam boot for ambulation. No new deficits. Pt refused assessment of abdomen, coccyx/sacrum, and buttocks.   IV ACCESS: L PIV w/ PPN, y-sited w/ abx; ok per pharmacist. Pre-medicating w/ benadryl before vancomycin.   ACTIVITY: independent. Ambulating in halls.   LAB: reviewed.   CHANGES: no acute major changes.   PLAN: continue w/ POC

## 2022-07-12 ENCOUNTER — MYC MEDICAL ADVICE (OUTPATIENT)
Dept: INTERNAL MEDICINE | Facility: CLINIC | Age: 52
End: 2022-07-12

## 2022-07-12 ENCOUNTER — HOME INFUSION (PRE-WILLOW HOME INFUSION) (OUTPATIENT)
Dept: PHARMACY | Facility: CLINIC | Age: 52
End: 2022-07-12

## 2022-07-12 DIAGNOSIS — R93.1 ABNORMAL ECHOCARDIOGRAM: Primary | ICD-10-CM

## 2022-07-12 LAB
ALBUMIN SERPL BCG-MCNC: 3.3 G/DL (ref 3.5–5.2)
ALP SERPL-CCNC: 102 U/L (ref 40–129)
ALT SERPL W P-5'-P-CCNC: 56 U/L (ref 10–50)
ANION GAP SERPL CALCULATED.3IONS-SCNC: 10 MMOL/L (ref 7–15)
AST SERPL W P-5'-P-CCNC: 20 U/L (ref 10–50)
BASOPHILS # BLD AUTO: 0.1 10E3/UL (ref 0–0.2)
BASOPHILS NFR BLD AUTO: 1 %
BILIRUB SERPL-MCNC: 0.2 MG/DL
BUN SERPL-MCNC: 12 MG/DL (ref 6–20)
CALCIUM SERPL-MCNC: 8.7 MG/DL (ref 8.6–10)
CHLORIDE SERPL-SCNC: 108 MMOL/L (ref 98–107)
CREAT SERPL-MCNC: 0.63 MG/DL (ref 0.67–1.17)
DEPRECATED HCO3 PLAS-SCNC: 26 MMOL/L (ref 22–29)
EOSINOPHIL # BLD AUTO: 0.2 10E3/UL (ref 0–0.7)
EOSINOPHIL NFR BLD AUTO: 3 %
ERYTHROCYTE [DISTWIDTH] IN BLOOD BY AUTOMATED COUNT: 14.6 % (ref 10–15)
GFR SERPL CREATININE-BSD FRML MDRD: >90 ML/MIN/1.73M2
GLUCOSE SERPL-MCNC: 79 MG/DL (ref 70–99)
HCT VFR BLD AUTO: 38.7 % (ref 40–53)
HGB BLD-MCNC: 12.2 G/DL (ref 13.3–17.7)
IMM GRANULOCYTES # BLD: 0.3 10E3/UL
IMM GRANULOCYTES NFR BLD: 4 %
LYMPHOCYTES # BLD AUTO: 2.7 10E3/UL (ref 0.8–5.3)
LYMPHOCYTES NFR BLD AUTO: 41 %
MCH RBC QN AUTO: 31 PG (ref 26.5–33)
MCHC RBC AUTO-ENTMCNC: 31.5 G/DL (ref 31.5–36.5)
MCV RBC AUTO: 99 FL (ref 78–100)
MONOCYTES # BLD AUTO: 0.6 10E3/UL (ref 0–1.3)
MONOCYTES NFR BLD AUTO: 10 %
NEUTROPHILS # BLD AUTO: 2.7 10E3/UL (ref 1.6–8.3)
NEUTROPHILS NFR BLD AUTO: 41 %
NRBC # BLD AUTO: 0 10E3/UL
NRBC BLD AUTO-RTO: 0 /100
PLATELET # BLD AUTO: 178 10E3/UL (ref 150–450)
POTASSIUM SERPL-SCNC: 3.9 MMOL/L (ref 3.4–5.3)
PROT SERPL-MCNC: 6.1 G/DL (ref 6.4–8.3)
RBC # BLD AUTO: 3.93 10E6/UL (ref 4.4–5.9)
SODIUM SERPL-SCNC: 144 MMOL/L (ref 136–145)
WBC # BLD AUTO: 6.6 10E3/UL (ref 4–11)

## 2022-07-12 PROCEDURE — 250N000013 HC RX MED GY IP 250 OP 250 PS 637: Performed by: STUDENT IN AN ORGANIZED HEALTH CARE EDUCATION/TRAINING PROGRAM

## 2022-07-12 PROCEDURE — 250N000009 HC RX 250: Performed by: INTERNAL MEDICINE

## 2022-07-12 PROCEDURE — 250N000011 HC RX IP 250 OP 636: Performed by: STUDENT IN AN ORGANIZED HEALTH CARE EDUCATION/TRAINING PROGRAM

## 2022-07-12 PROCEDURE — 87517 HEPATITIS B DNA QUANT: CPT | Performed by: INTERNAL MEDICINE

## 2022-07-12 PROCEDURE — C9113 INJ PANTOPRAZOLE SODIUM, VIA: HCPCS

## 2022-07-12 PROCEDURE — 36415 COLL VENOUS BLD VENIPUNCTURE: CPT | Performed by: INTERNAL MEDICINE

## 2022-07-12 PROCEDURE — 250N000011 HC RX IP 250 OP 636

## 2022-07-12 PROCEDURE — 87350 HEPATITIS BE AG IA: CPT | Performed by: INTERNAL MEDICINE

## 2022-07-12 PROCEDURE — 120N000002 HC R&B MED SURG/OB UMMC

## 2022-07-12 PROCEDURE — 80053 COMPREHEN METABOLIC PANEL: CPT

## 2022-07-12 PROCEDURE — 87389 HIV-1 AG W/HIV-1&-2 AB AG IA: CPT | Performed by: INTERNAL MEDICINE

## 2022-07-12 PROCEDURE — 85025 COMPLETE CBC W/AUTO DIFF WBC: CPT

## 2022-07-12 PROCEDURE — 99233 SBSQ HOSP IP/OBS HIGH 50: CPT | Performed by: INTERNAL MEDICINE

## 2022-07-12 PROCEDURE — 250N000011 HC RX IP 250 OP 636: Performed by: INTERNAL MEDICINE

## 2022-07-12 PROCEDURE — 86707 HEPATITIS BE ANTIBODY: CPT | Performed by: INTERNAL MEDICINE

## 2022-07-12 PROCEDURE — 250N000013 HC RX MED GY IP 250 OP 250 PS 637: Performed by: EMERGENCY MEDICINE

## 2022-07-12 PROCEDURE — 250N000013 HC RX MED GY IP 250 OP 250 PS 637: Performed by: INTERNAL MEDICINE

## 2022-07-12 PROCEDURE — C1769 GUIDE WIRE: HCPCS

## 2022-07-12 PROCEDURE — 36415 COLL VENOUS BLD VENIPUNCTURE: CPT

## 2022-07-12 PROCEDURE — 258N000003 HC RX IP 258 OP 636: Performed by: INTERNAL MEDICINE

## 2022-07-12 PROCEDURE — 99207 PR NO CHARGE SIGN-OFF PS: CPT | Performed by: INTERNAL MEDICINE

## 2022-07-12 PROCEDURE — 86704 HEP B CORE ANTIBODY TOTAL: CPT | Performed by: INTERNAL MEDICINE

## 2022-07-12 RX ORDER — BISACODYL 5 MG
5 TABLET, DELAYED RELEASE (ENTERIC COATED) ORAL ONCE
Status: DISCONTINUED | OUTPATIENT
Start: 2022-07-12 | End: 2022-07-12

## 2022-07-12 RX ORDER — ERTAPENEM 1 G/1
1 INJECTION, POWDER, LYOPHILIZED, FOR SOLUTION INTRAMUSCULAR; INTRAVENOUS EVERY 24 HOURS
Status: DISCONTINUED | OUTPATIENT
Start: 2022-07-12 | End: 2022-07-13 | Stop reason: HOSPADM

## 2022-07-12 RX ADMIN — OXYCODONE HYDROCHLORIDE 10 MG: 5 SOLUTION ORAL at 23:04

## 2022-07-12 RX ADMIN — LORAZEPAM 1 MG: 0.5 TABLET ORAL at 21:13

## 2022-07-12 RX ADMIN — OXYCODONE HYDROCHLORIDE 10 MG: 5 SOLUTION ORAL at 16:54

## 2022-07-12 RX ADMIN — SUCRALFATE 1 G: 1 SUSPENSION ORAL at 08:35

## 2022-07-12 RX ADMIN — FENTANYL TRANSDERMAL 1 PATCH: 25 PATCH, EXTENDED RELEASE TRANSDERMAL at 08:38

## 2022-07-12 RX ADMIN — SUCRALFATE 1 G: 1 SUSPENSION ORAL at 16:25

## 2022-07-12 RX ADMIN — ONDANSETRON 4 MG: 4 TABLET, ORALLY DISINTEGRATING ORAL at 11:05

## 2022-07-12 RX ADMIN — SUCRALFATE 1 G: 1 SUSPENSION ORAL at 23:03

## 2022-07-12 RX ADMIN — DIPHENHYDRAMINE HYDROCHLORIDE 50 MG: 50 CAPSULE ORAL at 04:59

## 2022-07-12 RX ADMIN — VANCOMYCIN HYDROCHLORIDE 1500 MG: 10 INJECTION, POWDER, LYOPHILIZED, FOR SOLUTION INTRAVENOUS at 21:05

## 2022-07-12 RX ADMIN — DEXTROAMPHETAMINE SACCHARATE, AMPHETAMINE ASPARTATE, DEXTROAMPHETAMINE SULFATE AND AMPHETAMINE SULFATE 20 MG: 2.5; 2.5; 2.5; 2.5 TABLET ORAL at 08:35

## 2022-07-12 RX ADMIN — ENOXAPARIN SODIUM 80 MG: 80 INJECTION SUBCUTANEOUS at 08:35

## 2022-07-12 RX ADMIN — SUCRALFATE 1 G: 1 SUSPENSION ORAL at 11:05

## 2022-07-12 RX ADMIN — CEFEPIME HYDROCHLORIDE 2 G: 2 INJECTION, POWDER, FOR SOLUTION INTRAVENOUS at 08:38

## 2022-07-12 RX ADMIN — ERTAPENEM SODIUM 1 G: 1 INJECTION, POWDER, LYOPHILIZED, FOR SOLUTION INTRAMUSCULAR; INTRAVENOUS at 16:25

## 2022-07-12 RX ADMIN — ACETAMINOPHEN 500 MG: 160 SOLUTION ORAL at 05:03

## 2022-07-12 RX ADMIN — ACETAMINOPHEN 500 MG: 160 SOLUTION ORAL at 23:03

## 2022-07-12 RX ADMIN — MAGNESIUM SULFATE HEPTAHYDRATE: 500 INJECTION, SOLUTION INTRAMUSCULAR; INTRAVENOUS at 20:58

## 2022-07-12 RX ADMIN — ENOXAPARIN SODIUM 80 MG: 80 INJECTION SUBCUTANEOUS at 23:04

## 2022-07-12 RX ADMIN — OXYCODONE HYDROCHLORIDE 10 MG: 5 SOLUTION ORAL at 11:05

## 2022-07-12 RX ADMIN — DIPHENHYDRAMINE HYDROCHLORIDE 50 MG: 50 CAPSULE ORAL at 16:54

## 2022-07-12 RX ADMIN — VANCOMYCIN HYDROCHLORIDE 1500 MG: 10 INJECTION, POWDER, LYOPHILIZED, FOR SOLUTION INTRAVENOUS at 06:21

## 2022-07-12 RX ADMIN — OXYCODONE HYDROCHLORIDE 10 MG: 5 SOLUTION ORAL at 04:59

## 2022-07-12 RX ADMIN — PANTOPRAZOLE SODIUM 40 MG: 40 INJECTION, POWDER, FOR SOLUTION INTRAVENOUS at 08:35

## 2022-07-12 RX ADMIN — CEFEPIME HYDROCHLORIDE 2 G: 2 INJECTION, POWDER, FOR SOLUTION INTRAVENOUS at 00:12

## 2022-07-12 ASSESSMENT — ACTIVITIES OF DAILY LIVING (ADL)
ADLS_ACUITY_SCORE: 24

## 2022-07-12 NOTE — PROGRESS NOTES
CLINICAL NUTRITION SERVICES - BRIEF NOTE     Nutrition Prescription    RECOMMENDATIONS FOR MDs/PROVIDERS TO ORDER:  -None additionally at this time.    Recommendations already ordered by Registered Dietitian (RD):  -Entered TPN change request for once pt has central line replaced, to restart pts home TPN regimen:  --Dosing weight: 81 kg   --Access: TBD (plan for Cm line replacement today, 7/12)  --Cyclic TPN x 12 hours/evening (2000 to 0800, or per pt preference)  --Run @ rate of 60 mL/hr for 1 hour, 120 mL/hr for 10 hours, and decrease rate to 60 mL/hr x 1 hour for total of 12-hour TPN cycle.   --Total volume ~1320 mL or per phamD with dextrose of 175 g (max GIR= 2.5 mg/kg/min), 100 g AA (1.2 g/kg)   --250 mL 20% Intralipid per home regimen 3x/week  --TPN provides: 175 g dex (595 kcal), 100 g AA (1.2 g/kg) and 250 mL 20% intralipid 3 days per week = 1209 Kcals/day (15  Kcal/kg) and 18% kcal from fat   --Lytes per DAY:  Na 50 meq/d, K 70 meq/d, Ca 12  meq/d, Mag 6 meq/d, Phos15 mmol/d, 29% chloride, Infuvite 10 ml/day, Tralement1 ml/day, Famotidine 20 mg/d, zinc 5 mg/day      Future/Additional Recommendations:  -Continue to monitor TPN for tolerance.      Findings  -Pt is due to have Cm line replaced today.  -Discussed with pt, he had refused IV lipids in the past d/t not having central access, but is OK with receiving IV lipids once he has his central line replaced.  -Pt had no questions or concerns at this time, pt is excited to have line replaced and to discharge.     INTERVENTIONS  Implementation  Nutrition education for recommended modifications   Parenteral Nutrition/IV Fluids - Modify composition, rate, schedule and volume      Follow up/Monitoring  RD will continue to follow pt per protocol.     Harrison Mera RD, LD  [address] RD pager: [phone]  Weekend/Holiday RD pager: [phone]

## 2022-07-12 NOTE — PROGRESS NOTES
ADDENDUM NOTE:    On additional review of patient's labs I noted that he had a hepatitis B surface antigen positive from 7/8/22. The HCV serology from the same date was negative.  The hepatitis B surface antibody was negative. For completeness, I placed an add on order for HBV viral load (PCR) as well as Hepatitis B e antigen and antibody and hepatitis B core antibody. For completeness I also added HIV serology.     In my sign off note today I mentioned that the patient would not need ID follow up from the bacteremia standpoint since it was already cleared and antibiotic plan is finalized. However given the hepatitis B surface  antigen positive, I will plan to schedule a follow up in ID clinic to follow up the hepatitis B tests added today as well as HIV serology.     Remaining recommendations are in my sign off note from today. I am still signing off today.     Sandra Horne    07/12/22

## 2022-07-12 NOTE — PROGRESS NOTES
Care Management Follow Up    Length of Stay (days): 5    Expected Discharge Date: 07/13/2022     Patient plan of care discussed at interdisciplinary rounds: Yes    Anticipated Discharge Disposition: Home, Home Infusion, Home Care     Anticipated Discharge Services:  Home care  Anticipated Discharge DME:  TPN    Education Provided on the Discharge Plan: yes   Patient/Family in Agreement with the Plan: yes    Additional Information:  Parker Acevedo is a 51 year old male admitted on 7/7/2022. He presents with positive blood cultures, has a history of CLBSI, cardiomyopathy, chronic abdominal pain, GERD, RUE DVT, dysphagia, hyperlipidemia, malnutrition, TPN, short gut syndrome s/p baratric surgery with a chronic G tube vent, and is admitted for bacteremia and concern for encephalitis.   He lives in a house with his wife, Rose and 20 year old. He is independent with his ADLs and uses assistance from his wife as needed.    Writer spoke with provider who stated patient will be discharging tomorrow. Writer sent update to [facility] liaison, that patient will be discharging with TPN, ertapenem, and needs port care and labs from home nurse. Per provider, writer updated [facility] that patient will need his afternoon ertapenem at home tomorrow.    Writer spoke with patient to discuss discharge plan. Patient said his wife will be here to discharge him at 11am tomorrow. Discussed discharge plan and patient stated he didn't anticipate any other needs.       Samantha Avery RN, BSN  Care Coordinator 7D  Office: [phone]  Pager: [phone]

## 2022-07-12 NOTE — PLAN OF CARE
B/P: 120/84, T: 99.4, P: 68, R: 16 C/O pain, oxycodone given along with tylenol. NPO for Port placement today in IR. PPN given via peripheral IV. Stopped PPN while antibiotics run. Benadryl given pre vanco for history of reaction. Limb alert placed on R arm for DVT. G tube clamped, states vents when needed. Appeared to sleep in between cares. Continue to monitor and notify MD with any significant changes.

## 2022-07-12 NOTE — PROGRESS NOTES
General Infectious Disease Service - Yellow Team - Chart Review Sign Off Note    Patient:  Parker Acevedo, Date of birth 1970, Medical record number [id number]  Date of Admission: 7/7/2022  Date of Visit:  7/12/2022         Assessment and Recommendations:   Problem List:    1. CLABSI, polymicrobial with Enterobacter, Lactococcus, Bacillus - Cm removed on 7/8 - blood cultures cleared on 7/8  2. Sepsis, encephalopathy - resolved  3. H/o bariatric surgery, c/b short-gut syndrome on chronic TPN  4. H/o recurrent and frequent CLABSI  5. H/o R subcalvian vein thrombus  6. Allergies listed to penicillins, TMP-SMX, doxycycline, vancomycin (tolerates slow infusion and premedication)        Discussion:     Mr. Parker Acevedo51 yo M with a h/o prior bariatric surgery on chronic TPN c/b short-gut syndrome and frequent hospitalizations for recurrent CLABSI that presented to ED on 7/7/22 with fevers and concern for a new line (R Cm) infection. Blood cultures were repeated on arrival to ED. Patient arrived incoherent and disoriented though afebrile and hemodynamically stable. No leukocytosis, CRP elevated. He was started on empiric antibiotics, including vancomycin and meropenem. Blood cultures from 7/5 and 7/7 polymicrobial; with  Enterobacter (sensitive to cefepime), Lactococcus lactis (sensitive to penicillin and vancomycin) and Bacillus cereus sensitive to vancomycin. Source of bacteremia likely new CLABSI so the Cm catheter was removed on 7/8/22. Blood cultures are negative since 7/8. He is afebrile since 7/9. Meropenem was narrowed to cefepime on 7/8/22 (covering the Enterobacter) and he continues to be on vancomycin (coverine the Bacillus cereus and Lactococcus). TTE was negative for vegetations.     PS.: I have signed off on 7/11/22 recommending 14 days of treatment with cefepime and vancomycin, however I was contacted by OPAT/Home infusion team and they asked if there is an alternative to  cefepime with lower number of infusions to facilitate the administration when patient is dismissed. Ertapenem would be a good alternative since it will still cover the Enterobacter and just requires once a day infusion. Vancomycin will need to be continued.           Recommendations:     1. Please stop cefepime and start ertapenem 1 gram IV q 24h.  Please continue IV vancomycin (pharmacy to dose).  Duration will be 14 days from first negative blood culture -> 7/8/22-7/22/22. This is a hard stop.     2. New central line can be placed since the patient has cleared blood cultures for >72h                3.  While on antibiotics, please obtain at least weekly CBC,  CMP and CRP (daily CBC and BMP while in hospital). Patient will also need vancomycin trough level monitoring according to pharmacy protocol and pharmacy support for the duration of antimicrobial treatment. The results can be followed by primary care provider or primary team. We can be contacted if abnormal results.      4. I do not anticipate need for follow up in ID clinic if continued improvement     5. I will sign off at this moment. Please see sign off recommendations above. Please call us back if any question or need. Please call us back if any new positivity from blood cultures        Sandra Horne MD  Date of Service: 07/12/22  Pager: [phone]

## 2022-07-12 NOTE — PLAN OF CARE
Vitals:    07/11/22 1634 07/11/22 2211 07/12/22 0750 07/12/22 1537   BP: 122/70 120/84 110/75 113/72   BP Location: Left arm Left arm Left arm Left arm   Pulse: 67 68 57 70   Resp: 16 16 17 18   Temp: 98.8  F (37.1  C) 99.4  F (37.4  C) 98.6  F (37  C) 98.5  F (36.9  C)   TempSrc: Oral Oral Oral Oral   SpO2: 95% 100% 99% 98%   Weight:   84.1 kg (185 lb 6.5 oz)        Neuro: alert and oriented,     VS:afebrile vital stable, sating 98% room air,    Cardiac: 70    Pain/Nausea:dnies any nausea. Oxy for pain,     Lines/Drains: G-tube clamp, left PIV antibiotic and TPN infusing    GI/: voiding adequate,  Audible BS,     Diet/Appetite: was NPO for azevedo placement,     Activity:up independently, out to smoke,     Plan:  NPO after midnight for 0830 port placement, continue with plan of care.

## 2022-07-12 NOTE — PROGRESS NOTES
Physician Attestation   I, Julisa Gan DO, was present with the medical/JACQUI student who participated in the service and in the documentation of the note.  I have verified the history and personally performed the physical exam and medical decision making.  I agree with the assessment and plan of care as documented in the note.      I personally reviewed vital signs, medications, labs and imaging.      Julisa Gan DO  Date of Service (when I saw the patient): 07/12/22    [facility]    Progress Note - Medicine Service, [facility] TEAM 2       Date of Admission:  7/7/2022    Assessment & Plan          Parker Acevedo is a 51 year old male with history of CLBSI, cardiomyopathy, chronic abdominal pain, GERD, RUE DVT, dysphagia, hyperlipidemia, malnutrition, TPN, short gut syndrome s/p baratric surgery with a chronic G tube vent admitted for sepsis from acute line infection. Patient is now s/p Cm line removal by IR (7/8) with clinical and vital improvement on IV cefepime and vancomycin.     Patient remains inpatient while awaiting Cm line replacement on 7/12 with plans for continued IV antibiotics until 7/22 per [facility] ID.     Changes today:  - Cultures clear x4 days  - Cm replacement today with IR  - TPN for severe malnutrition scheduled once Cm is in  - Antibiotic duration until 7/22 per ID   - Stop cefepime   - Start ertapenem 1g IV   - Continue DVT treatment per heme  - Lymphedema consult for upper extremeties     # Severe sepsis, improving  # Polymicrobial bacteremia  Likely secondary to recurrent CLBSI (Cm), s/p Cm removal by IR on 7/8.   - stop cefepime per ID  - start ertapenem 1 g IV (EOT 7/22)  - 7/12 Cm replacement given 4 days of clear cultures  - 7/8 TTE shows no obvious valvular vegetations or abnormalities. Reduced EF 45-50%.     Antibiotics  - Ertapenem (7/12-7/22)  - Cefepime (7/8-7/12)  - Vancomycin (7/8-7/22)  - Meropenem  (7/7-7/8), empiric      Cultures:  - 7/8, 7/9  cultures NGTD  - 7/7 UA shows mucous urine with ketones (10), albumin (10), urobilinogen (4), and small amount of blood  - 7/7 cultures positive for enterobacter cloacae, bacillus cereus, lactococcus  - 7/5 outside cultures positive for enterobacter cloacae, bacillus cereus, lactococcus    - resistant to Ampicillin, Unasyn and Penicillin     # Bilateral upper extremity DVTs  Patient was admitted for a RUE DVT in February 2022 and treated with 120 mg Lovenox daily. On admission (7/7) Lovenox reduced to 40 mg in setting of thrombocytopenia. Exam on 7/10 showed bilateral upper extremity edema without redness concerning for DVT. Ultrasound showed non- occlusive DVTs of the R internal jugular, R subclavian, R axillary, and L medial subclavian veins.   - 80 mg Lovenox BID while admitted  - 120 mg Lovenox daily on discharge per [facility]  - Lymphedema consult in for RUE edema, likely chronic lymphedema of R arm   - follow up at [facility] clinic in 1 month as previously scheduled    # Encephalopathy, secondary to sepsis - resolved  - Infectious work-up as above      #Severe malnutrition secondary to chronic disease, short gut, gastroparesis  TPN dependent  History of short gut syndrome and several GI surgeries including bariatric surgery, appendectomy, and cholecystectomy. Patient receives TPN initially via G tube feed (failed, now only for venting) now via Cm. Additional history of dysphagia. Oral intake for pleasure per wife. TPN stopped 7/5 when patient became febrile. This is based on previous experience with central line infections.   - Nutrition following  - PPN during hospital course   - TPN resumption after Cm replaced. Orders in per nutrition     #GERD  #Chronic abdominal pain  Patient has history of peptic ulcers and GERD. Wife denies any knowledge of recent blood in the stool or recent vomiting. Patient on Lovenox therapy for recent DVT increasing his bleeding risk.  Elevated LFTs and alk phos (7/8) concerning for hepatic ischemia but this is unlikely due to appropriate doppler on US and stable LFT trend on repeat CMP. LFTs and alk phos resolving 7/10.   - Continue pantoprazole as above  - Continue GI Cocktail     Imaging  - 7/8 Abd US w/ hepatomegaly and nonspecific periportal echogenicity c/w  inflammation vs congestion.     # Pancytopenia, likely related to sepsis, improved    # Positive Hep B Surface Antigen   Hep B surface antibody negative. Hep C serology negative.   -HBV viral load (PCR) and HBV e antigen and antibody, and core antibody pending, HIV pending  -Outpatient ID follow-up for Hep B labs        Diet: parenteral nutrition - ADULT compounded formula  NPO per Anesthesia Guidelines for Procedure/Surgery Except for: Meds    DVT Prophylaxis: Enoxaparin (Lovenox) SQ  Sanchez Catheter: Not present  Fluids: none  Central Lines: Cm  Cardiac Monitoring: None  Code Status: Full Code      Disposition Plan     The patient's care was discussed with the primary care team.    Marilou Mcgregor, MS3  Medicine Service, [facility] TEAM 2  [facility]  Securely message with the Vocera Web Console (learn more here)  Text page via [facility] Paging/Directory   Please see signed in provider for up to date coverage information        ______________________________________________________________________    Interval History   No overnight events. Plan of care discussed with patient. Patient is in good spirits. He reports no pain outside of baseline. Complains of RUE swelling, but notes it is reduced from 7/11. Patient is aware of plan to restart TPN once Cm is replaced and to continue antibiotics at home.     4 point ROS otherwise negative    Data reviewed today: I reviewed all medications, new labs and imaging results over the last 24 hours. I personally reviewed the no new imaging today.    Physical Exam   Vital Signs: Temp: 98.6  F (37  C) Temp  src: Oral BP: 110/75 Pulse: 57   Resp: 17 SpO2: 99 % O2 Device: None (Room air)    Weight: 185 lbs 6.51 oz     General: Alert and oriented x3  HEENT: MMM, non-icteric sclera  CV: RRR, normal S1S2, no murmur, clicks, rubs appreciated  Resp: Non-labored respirations, good air movement, no wheezes, rhonchi  Abd: Soft, non-distended, non-tender to baseline chronic pain  Extremities: wwp, bilateral upper extremity edema worse on right. Notably less swelling than days prior.   Skin: Warm and dry, no obvious rashes or lesions aside from baseline abdominal bruising from Lovenox injection  Neuro: A/Ox3, No lateralizing symptoms or gross neurologic deficits  Psych: Appropriate mood    Data   Recent Labs   Lab 07/12/22  0817 07/11/22  0744 07/10/22  0519 07/09/22  0601 07/08/22  1858 07/08/22  1049   WBC 6.6 5.5 8.8 11.7*  --  12.7*   HGB 12.2* 10.9* 10.9* 10.2*  --  10.1*   MCV 99 96 97 97  --  99    133* 124* 97*  --  96*   INR  --  1.10  --  1.18*  --  1.42*    144 140 142   < >  --    POTASSIUM 3.9 3.7 3.6 3.5   < >  --    CHLORIDE 108* 110* 107 110*   < >  --    CO2 26 27 25 25   < >  --    BUN 12.0 11.3 8.1 10.1   < >  --    CR 0.63* 0.61* 0.62* 0.64*   < >  --    ANIONGAP 10 7 8 7   < >  --    SILAS 8.7 8.2* 8.1* 7.9*   < >  --    GLC 79 104* 109* 85   < >  --    ALBUMIN 3.3* 3.0* 2.9* 2.7*  --  3.0*   PROTTOTAL 6.1* 5.4* 5.4* 5.1*  --  5.2*   BILITOTAL 0.2 0.2 0.3 0.3  --  0.3   ALKPHOS 102 104 126 133*  --  164*   ALT 56* 69* 102* 134*  --  203*   AST 20 15 25 62*  --  158*    < > = values in this interval not displayed.

## 2022-07-13 ENCOUNTER — HOME INFUSION (PRE-WILLOW HOME INFUSION) (OUTPATIENT)
Dept: PHARMACY | Facility: CLINIC | Age: 52
End: 2022-07-13

## 2022-07-13 ENCOUNTER — APPOINTMENT (OUTPATIENT)
Dept: INTERVENTIONAL RADIOLOGY/VASCULAR | Facility: CLINIC | Age: 52
DRG: 314 | End: 2022-07-13
Attending: NURSE PRACTITIONER
Payer: COMMERCIAL

## 2022-07-13 VITALS
RESPIRATION RATE: 14 BRPM | TEMPERATURE: 98.4 F | DIASTOLIC BLOOD PRESSURE: 69 MMHG | BODY MASS INDEX: 25.86 KG/M2 | HEART RATE: 53 BPM | OXYGEN SATURATION: 97 % | WEIGHT: 185.41 LBS | SYSTOLIC BLOOD PRESSURE: 123 MMHG

## 2022-07-13 LAB
ALBUMIN SERPL BCG-MCNC: 3.5 G/DL (ref 3.5–5.2)
ALP SERPL-CCNC: 109 U/L (ref 40–129)
ALT SERPL W P-5'-P-CCNC: 59 U/L (ref 10–50)
ANION GAP SERPL CALCULATED.3IONS-SCNC: 13 MMOL/L (ref 7–15)
AST SERPL W P-5'-P-CCNC: 24 U/L (ref 10–50)
BACTERIA BLD CULT: NO GROWTH
BASOPHILS # BLD AUTO: 0.1 10E3/UL (ref 0–0.2)
BASOPHILS NFR BLD AUTO: 1 %
BILIRUB SERPL-MCNC: 0.3 MG/DL
BUN SERPL-MCNC: 11.3 MG/DL (ref 6–20)
CALCIUM SERPL-MCNC: 8.6 MG/DL (ref 8.6–10)
CHLORIDE SERPL-SCNC: 107 MMOL/L (ref 98–107)
CREAT SERPL-MCNC: 0.62 MG/DL (ref 0.67–1.17)
DEPRECATED HCO3 PLAS-SCNC: 21 MMOL/L (ref 22–29)
EOSINOPHIL # BLD AUTO: 0.2 10E3/UL (ref 0–0.7)
EOSINOPHIL NFR BLD AUTO: 2 %
ERYTHROCYTE [DISTWIDTH] IN BLOOD BY AUTOMATED COUNT: 14.7 % (ref 10–15)
GFR SERPL CREATININE-BSD FRML MDRD: >90 ML/MIN/1.73M2
GLUCOSE SERPL-MCNC: 85 MG/DL (ref 70–99)
HBV CORE AB SERPL QL IA: NONREACTIVE
HCT VFR BLD AUTO: 42.3 % (ref 40–53)
HGB BLD-MCNC: 13.3 G/DL (ref 13.3–17.7)
HIV 1+2 AB+HIV1 P24 AG SERPL QL IA: NONREACTIVE
IMM GRANULOCYTES # BLD: 0.3 10E3/UL
IMM GRANULOCYTES NFR BLD: 3 %
LYMPHOCYTES # BLD AUTO: 3.3 10E3/UL (ref 0.8–5.3)
LYMPHOCYTES NFR BLD AUTO: 33 %
MCH RBC QN AUTO: 31.4 PG (ref 26.5–33)
MCHC RBC AUTO-ENTMCNC: 31.4 G/DL (ref 31.5–36.5)
MCV RBC AUTO: 100 FL (ref 78–100)
MONOCYTES # BLD AUTO: 0.9 10E3/UL (ref 0–1.3)
MONOCYTES NFR BLD AUTO: 9 %
NEUTROPHILS # BLD AUTO: 5.2 10E3/UL (ref 1.6–8.3)
NEUTROPHILS NFR BLD AUTO: 52 %
NRBC # BLD AUTO: 0 10E3/UL
NRBC BLD AUTO-RTO: 0 /100
PHOSPHATE SERPL-MCNC: 3.7 MG/DL (ref 2.5–4.5)
PLATELET # BLD AUTO: 203 10E3/UL (ref 150–450)
POTASSIUM SERPL-SCNC: 4.3 MMOL/L (ref 3.4–5.3)
PROT SERPL-MCNC: 6.2 G/DL (ref 6.4–8.3)
RBC # BLD AUTO: 4.23 10E6/UL (ref 4.4–5.9)
SODIUM SERPL-SCNC: 141 MMOL/L (ref 136–145)
VANCOMYCIN SERPL-MCNC: 11.8 UG/ML
WBC # BLD AUTO: 10 10E3/UL (ref 4–11)

## 2022-07-13 PROCEDURE — 250N000013 HC RX MED GY IP 250 OP 250 PS 637: Performed by: STUDENT IN AN ORGANIZED HEALTH CARE EDUCATION/TRAINING PROGRAM

## 2022-07-13 PROCEDURE — C1769 GUIDE WIRE: HCPCS

## 2022-07-13 PROCEDURE — 80053 COMPREHEN METABOLIC PANEL: CPT

## 2022-07-13 PROCEDURE — 250N000011 HC RX IP 250 OP 636: Performed by: STUDENT IN AN ORGANIZED HEALTH CARE EDUCATION/TRAINING PROGRAM

## 2022-07-13 PROCEDURE — 99152 MOD SED SAME PHYS/QHP 5/>YRS: CPT | Mod: GC | Performed by: RADIOLOGY

## 2022-07-13 PROCEDURE — 36415 COLL VENOUS BLD VENIPUNCTURE: CPT

## 2022-07-13 PROCEDURE — 76937 US GUIDE VASCULAR ACCESS: CPT

## 2022-07-13 PROCEDURE — 250N000013 HC RX MED GY IP 250 OP 250 PS 637: Performed by: INTERNAL MEDICINE

## 2022-07-13 PROCEDURE — 84100 ASSAY OF PHOSPHORUS: CPT | Performed by: INTERNAL MEDICINE

## 2022-07-13 PROCEDURE — 250N000011 HC RX IP 250 OP 636

## 2022-07-13 PROCEDURE — 36558 INSERT TUNNELED CV CATH: CPT | Mod: 78 | Performed by: RADIOLOGY

## 2022-07-13 PROCEDURE — 36558 INSERT TUNNELED CV CATH: CPT

## 2022-07-13 PROCEDURE — 99152 MOD SED SAME PHYS/QHP 5/>YRS: CPT

## 2022-07-13 PROCEDURE — 76937 US GUIDE VASCULAR ACCESS: CPT | Mod: 26 | Performed by: RADIOLOGY

## 2022-07-13 PROCEDURE — 99153 MOD SED SAME PHYS/QHP EA: CPT

## 2022-07-13 PROCEDURE — C9113 INJ PANTOPRAZOLE SODIUM, VIA: HCPCS

## 2022-07-13 PROCEDURE — 02H633Z INSERTION OF INFUSION DEVICE INTO RIGHT ATRIUM, PERCUTANEOUS APPROACH: ICD-10-PCS | Performed by: RADIOLOGY

## 2022-07-13 PROCEDURE — 250N000009 HC RX 250

## 2022-07-13 PROCEDURE — 85004 AUTOMATED DIFF WBC COUNT: CPT

## 2022-07-13 PROCEDURE — 250N000011 HC RX IP 250 OP 636: Performed by: INTERNAL MEDICINE

## 2022-07-13 PROCEDURE — 0JH63XZ INSERTION OF TUNNELED VASCULAR ACCESS DEVICE INTO CHEST SUBCUTANEOUS TISSUE AND FASCIA, PERCUTANEOUS APPROACH: ICD-10-PCS | Performed by: RADIOLOGY

## 2022-07-13 PROCEDURE — 272N000504 HC NEEDLE CR4

## 2022-07-13 PROCEDURE — 999N000128 HC STATISTIC PERIPHERAL IV START W/O US GUIDANCE

## 2022-07-13 PROCEDURE — 80202 ASSAY OF VANCOMYCIN: CPT | Performed by: INTERNAL MEDICINE

## 2022-07-13 PROCEDURE — 250N000013 HC RX MED GY IP 250 OP 250 PS 637: Performed by: EMERGENCY MEDICINE

## 2022-07-13 PROCEDURE — 258N000003 HC RX IP 258 OP 636: Performed by: INTERNAL MEDICINE

## 2022-07-13 PROCEDURE — 99238 HOSP IP/OBS DSCHRG MGMT 30/<: CPT | Mod: GC | Performed by: INTERNAL MEDICINE

## 2022-07-13 PROCEDURE — C1751 CATH, INF, PER/CENT/MIDLINE: HCPCS

## 2022-07-13 PROCEDURE — 77001 FLUOROGUIDE FOR VEIN DEVICE: CPT | Mod: 26 | Performed by: RADIOLOGY

## 2022-07-13 RX ORDER — DIPHENHYDRAMINE HCL 50 MG
50 CAPSULE ORAL EVERY 12 HOURS
Qty: 14 CAPSULE | Refills: 0 | Status: SHIPPED | OUTPATIENT
Start: 2022-07-13 | End: 2022-08-17

## 2022-07-13 RX ORDER — FLUMAZENIL 0.1 MG/ML
0.2 INJECTION, SOLUTION INTRAVENOUS
Status: DISCONTINUED | OUTPATIENT
Start: 2022-07-13 | End: 2022-07-13 | Stop reason: HOSPADM

## 2022-07-13 RX ORDER — NALOXONE HYDROCHLORIDE 0.4 MG/ML
0.4 INJECTION, SOLUTION INTRAMUSCULAR; INTRAVENOUS; SUBCUTANEOUS
Status: DISCONTINUED | OUTPATIENT
Start: 2022-07-13 | End: 2022-07-13 | Stop reason: HOSPADM

## 2022-07-13 RX ORDER — FENTANYL 25 UG/1
1 PATCH TRANSDERMAL
Qty: 1 PATCH | Refills: 0 | Status: CANCELLED | OUTPATIENT
Start: 2022-07-14

## 2022-07-13 RX ORDER — ENOXAPARIN SODIUM 100 MG/ML
80 INJECTION SUBCUTANEOUS 2 TIMES DAILY
Qty: 48 ML | Refills: 0 | Status: SHIPPED | OUTPATIENT
Start: 2022-07-13 | End: 2022-08-02

## 2022-07-13 RX ORDER — ENOXAPARIN SODIUM 100 MG/ML
80 INJECTION SUBCUTANEOUS 2 TIMES DAILY
Qty: 48 ML | Refills: 0 | OUTPATIENT
Start: 2022-07-13 | End: 2024-08-08

## 2022-07-13 RX ORDER — ERTAPENEM 1 G/1
1 INJECTION, POWDER, LYOPHILIZED, FOR SOLUTION INTRAMUSCULAR; INTRAVENOUS EVERY 24 HOURS
Qty: 90 ML | Refills: 0 | Status: SHIPPED | OUTPATIENT
Start: 2022-07-13 | End: 2022-07-22

## 2022-07-13 RX ORDER — NALOXONE HYDROCHLORIDE 0.4 MG/ML
0.2 INJECTION, SOLUTION INTRAMUSCULAR; INTRAVENOUS; SUBCUTANEOUS
Status: DISCONTINUED | OUTPATIENT
Start: 2022-07-13 | End: 2022-07-13 | Stop reason: HOSPADM

## 2022-07-13 RX ORDER — CARVEDILOL 12.5 MG/1
12.5 TABLET ORAL 2 TIMES DAILY WITH MEALS
Qty: 60 TABLET | Refills: 0 | Status: SHIPPED | OUTPATIENT
Start: 2022-07-13 | End: 2022-07-25

## 2022-07-13 RX ORDER — ERTAPENEM 1 G/1
1 INJECTION, POWDER, LYOPHILIZED, FOR SOLUTION INTRAMUSCULAR; INTRAVENOUS EVERY 24 HOURS
Qty: 90 ML | Refills: 0
Start: 2022-07-13 | End: 2024-08-08

## 2022-07-13 RX ORDER — HEPARIN SODIUM 200 [USP'U]/100ML
1 INJECTION, SOLUTION INTRAVENOUS CONTINUOUS PRN
Status: DISCONTINUED | OUTPATIENT
Start: 2022-07-13 | End: 2022-07-13 | Stop reason: HOSPADM

## 2022-07-13 RX ORDER — HEPARIN SODIUM,PORCINE 10 UNIT/ML
5 VIAL (ML) INTRAVENOUS
Status: COMPLETED | OUTPATIENT
Start: 2022-07-13 | End: 2022-07-13

## 2022-07-13 RX ORDER — FENTANYL CITRATE 0.05 MG/ML
25-50 INJECTION, SOLUTION INTRAMUSCULAR; INTRAVENOUS EVERY 5 MIN PRN
Status: DISCONTINUED | OUTPATIENT
Start: 2022-07-13 | End: 2022-07-13 | Stop reason: HOSPADM

## 2022-07-13 RX ORDER — DIPHENHYDRAMINE HCL 50 MG
50 CAPSULE ORAL EVERY 12 HOURS
Qty: 14 CAPSULE | Refills: 0 | OUTPATIENT
Start: 2022-07-13 | End: 2024-08-08

## 2022-07-13 RX ORDER — FENTANYL 25 UG/1
1 PATCH TRANSDERMAL
Qty: 1 PATCH | Refills: 0 | OUTPATIENT
Start: 2022-07-14 | End: 2024-08-08

## 2022-07-13 RX ADMIN — FENTANYL CITRATE 50 MCG: 50 INJECTION, SOLUTION INTRAMUSCULAR; INTRAVENOUS at 09:49

## 2022-07-13 RX ADMIN — FENTANYL CITRATE 50 MCG: 50 INJECTION, SOLUTION INTRAMUSCULAR; INTRAVENOUS at 10:01

## 2022-07-13 RX ADMIN — Medication 5 ML: at 10:13

## 2022-07-13 RX ADMIN — LIDOCAINE HYDROCHLORIDE 20 ML: 10 INJECTION, SOLUTION EPIDURAL; INFILTRATION; INTRACAUDAL; PERINEURAL at 10:13

## 2022-07-13 RX ADMIN — OXYCODONE HYDROCHLORIDE 10 MG: 5 SOLUTION ORAL at 06:29

## 2022-07-13 RX ADMIN — DIPHENHYDRAMINE HYDROCHLORIDE 50 MG: 50 CAPSULE ORAL at 08:09

## 2022-07-13 RX ADMIN — FENTANYL CITRATE 50 MCG: 50 INJECTION, SOLUTION INTRAMUSCULAR; INTRAVENOUS at 09:42

## 2022-07-13 RX ADMIN — VANCOMYCIN HYDROCHLORIDE 1500 MG: 10 INJECTION, POWDER, LYOPHILIZED, FOR SOLUTION INTRAVENOUS at 08:19

## 2022-07-13 RX ADMIN — ENOXAPARIN SODIUM 80 MG: 80 INJECTION SUBCUTANEOUS at 10:51

## 2022-07-13 RX ADMIN — MIDAZOLAM HYDROCHLORIDE 1 MG: 1 INJECTION, SOLUTION INTRAMUSCULAR; INTRAVENOUS at 09:49

## 2022-07-13 RX ADMIN — DEXTROAMPHETAMINE SACCHARATE, AMPHETAMINE ASPARTATE, DEXTROAMPHETAMINE SULFATE AND AMPHETAMINE SULFATE 20 MG: 2.5; 2.5; 2.5; 2.5 TABLET ORAL at 08:09

## 2022-07-13 RX ADMIN — FENTANYL CITRATE 50 MCG: 50 INJECTION, SOLUTION INTRAMUSCULAR; INTRAVENOUS at 09:59

## 2022-07-13 RX ADMIN — ONDANSETRON 4 MG: 4 TABLET, ORALLY DISINTEGRATING ORAL at 06:30

## 2022-07-13 RX ADMIN — SUCRALFATE 1 G: 1 SUSPENSION ORAL at 10:53

## 2022-07-13 RX ADMIN — ERTAPENEM SODIUM 1 G: 1 INJECTION, POWDER, LYOPHILIZED, FOR SOLUTION INTRAMUSCULAR; INTRAVENOUS at 10:53

## 2022-07-13 RX ADMIN — PANTOPRAZOLE SODIUM 40 MG: 40 INJECTION, POWDER, FOR SOLUTION INTRAVENOUS at 08:10

## 2022-07-13 RX ADMIN — SUCRALFATE 1 G: 1 SUSPENSION ORAL at 08:09

## 2022-07-13 RX ADMIN — MIDAZOLAM HYDROCHLORIDE 1 MG: 1 INJECTION, SOLUTION INTRAMUSCULAR; INTRAVENOUS at 10:00

## 2022-07-13 RX ADMIN — MIDAZOLAM HYDROCHLORIDE 1 MG: 1 INJECTION, SOLUTION INTRAMUSCULAR; INTRAVENOUS at 10:02

## 2022-07-13 RX ADMIN — MIDAZOLAM HYDROCHLORIDE 1 MG: 1 INJECTION, SOLUTION INTRAMUSCULAR; INTRAVENOUS at 09:42

## 2022-07-13 ASSESSMENT — ACTIVITIES OF DAILY LIVING (ADL)
ADLS_ACUITY_SCORE: 24

## 2022-07-13 NOTE — PHARMACY-VANCOMYCIN DOSING SERVICE
Pharmacy Vancomycin Note  Date of Service 2022  Patient's  1970   51 year old, male    Indication: Bacteremia and polymicrobial (Enterobacter, Bacillus cereus, and Lactococcus)  Day of Therapy: 7  Current vancomycin regimen:  1500 mg IV q12h  Current vancomycin monitoring method: AUC  Current vancomycin therapeutic monitoring goal: 400-600 mg*h/L    Insight[employer] Prediction of Current Vancomycin Regimen    Regimen: 1500 mg IV every 12 hours.  Exposure target: AUC24 (range)400-600 mg/L.hr   AUC24,ss: 465 mg/L.hr  Probability of AUC24 > 400: 84 %  Ctrough,ss: 11.9 mg/L  Probability of Ctrough,ss > 20: 0 %  Probability of nephrotoxicity (Lodise CARMELA ): 7 %    Current estimated CrCl = Estimated Creatinine Clearance: 171.4 mL/min (A) (based on SCr of 0.62 mg/dL (L)).    Creatinine for last 3 days  2022:  7:44 AM Creatinine 0.61 mg/dL  2022:  8:17 AM Creatinine 0.63 mg/dL  2022:  8:03 AM Creatinine 0.62 mg/dL    Recent Vancomycin Levels (past 3 days)  2022:  4:29 PM Vancomycin 13.7 ug/mL  2022:  8:03 AM Vancomycin 11.8 ug/mL    Vancomycin IV Administrations (past 72 hours)                   vancomycin 1500 mg in 0.9% NaCl 250 ml intermittent infusion 1,500 mg (mg) 1,500 mg New Bag 22 0819     1,500 mg New Bag 22 2105     1,500 mg New Bag  0621     1,500 mg New Bag 22 1822     1,500 mg New Bag  0607     1,500 mg New Bag 07/10/22 1909                Nephrotoxins and other renal medications (From now, onward)    Start     Dose/Rate Route Frequency Ordered Stop    22 0000  vancomycin        Note to Pharmacy: [facility] to jerardo       22 1041      22 1700  vancomycin 1500 mg in 0.9% NaCl 250 ml intermittent infusion 1,500 mg         1,500 mg  over 90 Minutes Intravenous EVERY 12 HOURS 22 1637               Contrast Orders - past 72 hours (72h ago, onward)    None          Interpretation of levels and current regimen:  Vancomycin  level is reflective of -600    Has serum creatinine changed greater than 50% in last 72 hours: No     Urine output: Unable to determine    Renal Function: Stable      Plan:  1. Continue Current Dose  2. Vancomycin monitoring method: AUC  3. Vancomycin therapeutic monitoring goal: 400-600 mg*h/L  4. Pharmacy will check vancomycin levels as appropriate in 3-5 Days.  5. Serum creatinine levels will be ordered daily for the first week of therapy and at least twice weekly for subsequent weeks.    Aarti Bernard, PharmD

## 2022-07-13 NOTE — PROGRESS NOTES
Patient Name: Parker Acevedo  Medical Record Number: [id number]  Today's Date: 7/13/2022    Procedure: Image guided tunneled CVC placement  Proceduralist: Dr. Richardson and Dr. Monzon  Pathology present: NA    Procedure Start: 0943  Procedure end: 1019  Sedation medications administered: Versed 4mg and Fentanyl 200mcg     Report given to: EVERETTE RN  : YOAV    Other Notes: Pt arrived to IR room 1 from [facility]. Consent reviewed. Pt denies any questions or concerns regarding procedure. Pt positioned supine and monitored per protocol. Pt tolerated procedure without any noted complications. Pt transferred back to [facility].

## 2022-07-13 NOTE — PLAN OF CARE
Vital signs:  Temp: 98.4  F (36.9  C) Temp src: Oral BP: 113/74 Pulse: 64   Resp: 18 SpO2: 100 % O2 Device: None (Room air)     Weight: 84.1 kg (185 lb 6.5 oz)    [id number]:    Activity: Up independently in halls and outside.   Neuros: A & O x4. Neuro intact.   Cardiac: WDL. Asymptomatic.   Respiratory: LS diminished in bases. Unlabored. O2 sats high 90s on RA. Denies SOB.   GI/: BS+, passing flatus, no BM this shift. Voiding, not saving.   Diet: NPO at midnight, patient aware.  Skin: Bruised abdomen.  Lines: Continuous TPN at 84 mL/hr via left PIV.   Drains: G-tube clamped.   Labs: None.   Pain/nausea: PRN oxycodone 10mg x1 and PRN oral Tylenol 500 mg x1 effective for left and mid abdominal pain that radiates to back. Denies nausea.   New changes this shift: None.   Plan: NPO at midnight for port placement today.

## 2022-07-13 NOTE — PROGRESS NOTES
Care Management Discharge Note    Discharge Date: 07/13/2022       Discharge Disposition: Home, Home Infusion, Home Care    Discharge Services: Home Inufsion     Discharge DME:NA      Discharge Transportation: family or friend will provide    Private pay costs discussed: Not applicable    PAS Confirmation Code: NA   Patient/family educated on Medicare website which has current facility and service quality ratings:yes      Education Provided on the Discharge Plan: yes   Persons Notified of Discharge Plans: patient, spouse  Patient/Family in Agreement with the Plan: yes    Handoff Referral Completed: Yes    Additional Information:  Per MD team patient is medically ready for discharge to home today.  TPN formula faxed to [facility].  Patient will discharge on IV abx(vanco and ertapenem).  Patient received dose of IV ertapenem this morning.  Will get delivery of TPN and IV abx this afternoon.  Patient has done IV abx at home in the past which his wife administers.  Pt's cousin will provide a ride home this afternoon.  RN[facility] will remain available if further needs arise.        [facility](TPN, IV Abx)   Ph:  [phone]  Fax: [phone]      [facility]  Ph: [phone]      GER[facility] Andrea  Phone: [phone]  Pager: [phone]  To contact the weekend RN[facility]  [place] (0800 - 1630) Saturday and Sunday    Units: 4A, 4C, 4E, 5A and 5B- Pager 1: [phone]    Units: 6A, 6B, 6C, 6D- Pager 2: [phone]    Units: 7A, 7B, 7C, 7D, and 5C-Pager 3: [phone]           Encounter Summary
  Created on: 2020
 
 Isak Hilario
 External Reference #: [id number]
 : 32
 Sex: Male
 
 Demographics
 
 
+-----------------------+----------------------+
| Address               | [address]         |
|                       | [place]  [address] |
+-----------------------+----------------------+
| Home Phone            | [phone]      |
+-----------------------+----------------------+
| Preferred Language    | Unknown              |
+-----------------------+----------------------+
| Marital Status        |               |
+-----------------------+----------------------+
| Mandaeism Affiliation | Unknown              |
+-----------------------+----------------------+
| Race                  | Unknown              |
+-----------------------+----------------------+
| Ethnic Group          | Unknown              |
+-----------------------+----------------------+
 
 
 Author
 
 
+--------------+--------------------------------------------+
| Author       | [facility]  |
|              | and [place]                                |
+--------------+--------------------------------------------+
| Organization | [facility]  |
|              | and [place]                                |
+--------------+--------------------------------------------+
| Address      | Unknown                                    |
+--------------+--------------------------------------------+
| Phone        | Unavailable                                |
+--------------+--------------------------------------------+
 
 
 
 Support
 
 
+-------------+--------------+---------+-----------------+
| Name        | Relationship | Address | Phone           |
+-------------+--------------+---------+-----------------+
| Alessandra Saxena | ECON         | Unknown | [phone] |
+-------------+--------------+---------+-----------------+
 
 
 
 Care Team Providers
 
 
 
+-----------------------+------+-----------------+
| Care Team Member Name | Role | Phone           |
+-----------------------+------+-----------------+
| Bari Ha MD | PCP  | [phone] |
+-----------------------+------+-----------------+
 
 
 
 Encounter Details
 
 
+--------+-------------+----------------------+--------------------+----------------------+
| Date   | Type        | Department           | Care Team          | Description          |
+--------+-------------+----------------------+--------------------+----------------------+
| / | Lab         |   [facility] |   K[facility]   | Acute on chronic     |
| 2020   | Requisition |  [facility]  | [address]   | diastolic            |
|        |             |  [address]  [place] | [address]   | (congestive) heart   |
|        |             |  [place]           | [address]  [place]     | failure (HCC);       |
|        |             | [address]           | [place] [address]    | Metabolic            |
|        |             | [phone]         | [phone]       | encephalopathy;      |
|        |             |                      | [phone] (Fax) | Essential (primary)  |
|        |             |                      |                    | hypertension         |
+--------+-------------+----------------------+--------------------+----------------------+
 
 
 
 Social History
 
 
+--------------+-------+-----------+--------+------+
| Tobacco Use  | Types | Packs/Day | Years  | Date |
|              |       |           | Used   |      |
+--------------+-------+-----------+--------+------+
| Never Smoker |       |           |        |      |
+--------------+-------+-----------+--------+------+
 
 
 
+---------------------+------+---+----------+
| Smokeless Tobacco:  | Chew |   | Quit:    |
| Former User         |      |   | 19 |
|                     |      |   | 87       |
+---------------------+------+---+----------+
 
 
 
+-------------+-------------+---------+----------+
| Alcohol Use | Drinks/Week | oz/Week | Comments |
+-------------+-------------+---------+----------+
| Not Asked   |             |         |          |
+-------------+-------------+---------+----------+
 
 
 
+------------------+---------------+
| Sex Assigned at  | Date Recorded |
| Birth            |               |
+------------------+---------------+
 
| Not on file      |               |
+------------------+---------------+
 
 
 
+----------------+-------------+-------------+
| Job Start Date | Occupation  | Industry    |
+----------------+-------------+-------------+
| Not on file    | Not on file | Not on file |
+----------------+-------------+-------------+
 
 
 
+----------------+--------------+------------+
| Travel History | Travel Start | Travel End |
+----------------+--------------+------------+
 
 
 
+-------------------------------------+
| No recent travel history available. |
+-------------------------------------+
 documented as of this encounter
 
 Plan of Treatment
 Not on filedocumented as of this encounter
 
 Procedures
 
 
+------------------+--------+-------------+----------------------+----------------------+
| Procedure Name   | Priori | Date/Time   | Associated Diagnosis | Comments             |
|                  | ty     |             |                      |                      |
+------------------+--------+-------------+----------------------+----------------------+
| BASIC METABOLIC  | Routin | 2020  |   Acute on chronic   |   Results for this   |
| PANEL            | e      |  1:30 PM    | diastolic            | procedure are in the |
|                  |        | PDT         | (congestive) heart   |  results section.    |
|                  |        |             | failure (HCC)        |                      |
|                  |        |             | Metabolic            |                      |
|                  |        |             | encephalopathy       |                      |
|                  |        |             | Essential (primary)  |                      |
|                  |        |             | hypertension         |                      |
+------------------+--------+-------------+----------------------+----------------------+
 documented in this encounter
 
 Results
 Basic Metabolic Panel (2020  1:30 PM PDT)
 
+-------------+--------------------------+-----------------+-------------+--------------+
| Component   | Value                    | Ref Range       | Performed   | Pathologist  |
|             |                          |                 | At          | Signature    |
+-------------+--------------------------+-----------------+-------------+--------------+
| Na          | 137                      | 136 - 145       | [facility]  |              |
|             |                          | mmol/L          | [facility]    |              |
|             |                          |                 | [facility]     |              |
|             |                          |                 | [facility]ENTER -    |              |
|             |                          |                 | LABORATORY  |              |
+-------------+--------------------------+-----------------+-------------+--------------+
| K           | 3.1 (L)                  | 3.4 - 5.1       | [facility]  |              |
|             |                          | mmol/L          | [facility]    |              |
 
|             |                          |                 | MED[facility]     |              |
|             |                          |                 | CENTER -    |              |
|             |                          |                 | LABORATORY  |              |
+-------------+--------------------------+-----------------+-------------+--------------+
| Cl          | 94 (L)                   | 98 - 107 mmol/L | [facility]  |              |
|             |                          |                 | [facility]    |              |
|             |                          |                 | [facility]     |              |
|             |                          |                 | CENTER -    |              |
|             |                          |                 | LABORATORY  |              |
+-------------+--------------------------+-----------------+-------------+--------------+
| CO2         | 34 (H)                   | 20 - 31 mmol/L  | [facility]  |              |
|             |                          |                 | [facility]    |              |
|             |                          |                 | MEDICAL     |              |
|             |                          |                 | CENTER -    |              |
|             |                          |                 | LABORATORY  |              |
+-------------+--------------------------+-----------------+-------------+--------------+
| Anion Gap   | 9                        | 3 - 16 mmol/L   | [facility]  |              |
|             |                          |                 | [facility]    |              |
|             |                          |                 | [facility]     |              |
|             |                          |                 | CENTER -    |              |
|             |                          |                 | LABORATORY  |              |
+-------------+--------------------------+-----------------+-------------+--------------+
| Glucose     | 159 (H)                  | 60 - 106 mg/dL  | [facility]  |              |
|             |                          |                 | [facility]    |              |
|             |                          |                 | MED[facility]     |              |
|             |                          |                 | CENTER -    |              |
|             |                          |                 | LABORATORY  |              |
+-------------+--------------------------+-----------------+-------------+--------------+
| BUN         | 37 (H)                   | 9 - 23 mg/dL    | [facility]  |              |
|             |                          |                 | [facility]    |              |
|             |                          |                 | [facility]     |              |
|             |                          |                 | CENTER -    |              |
|             |                          |                 | LABORATORY  |              |
+-------------+--------------------------+-----------------+-------------+--------------+
| Creatinine  | 1.30                     | 0.70 - 1.30     | [facility]  |              |
|             |                          | mg/dL           | [facility]    |              |
|             |                          |                 | MED[facility]     |              |
|             |                          |                 | CENTER -    |              |
|             |                          |                 | LABORATORY  |              |
+-------------+--------------------------+-----------------+-------------+--------------+
| eGFR if not | 52 (L)Comment:           | >=60            | [facility]  |              |
|      | GLOMER[facility]TR[facility]    | mL/min/1.73m2   | [facility]    |              |
| [employer]    | RATE,ESTIMATED           |                 | MEDICAL     |              |
|             |   mL/min/1.94y7Wzlx than |                 | CENTER -    |              |
|             |  60    Chronic kidney    |                 | LABORATORY  |              |
|             | disease,if found over a  |                 |             |              |
|             | 3-month period.Less than |                 |             |              |
|             |  15    Kidney failureFor |                 |             |              |
|             |                   |                 |             |              |
|             | Americans,multiply the   |                 |             |              |
|             | calculated GFR by 1.21.  |                 |             |              |
|             |                          |                 |             |              |
+-------------+--------------------------+-----------------+-------------+--------------+
| Calcium     | 9.6                      | 8.7 - 10.4      | [facility]  |              |
|             |                          | mg/dL           | [facility]    |              |
|             |                          |                 | MEDICAL     |              |
|             |                          |                 | CENTER -    |              |
|             |                          |                 | LABORATORY  |              |
+-------------+--------------------------+-----------------+-------------+--------------+
| BUN/Creatin | 28.5                     |                 | [facility]  |              |
 
| ine Ratio   |                          |                 | [facility]    |              |
|             |                          |                 | MEDICAL     |              |
|             |                          |                 | CENTER -    |              |
|             |                          |                 | LABORATORY  |              |
+-------------+--------------------------+-----------------+-------------+--------------+
 
 
 
+----------+
| Specimen |
+----------+
| Blood    |
+----------+
 
 
 
+----------------------+--------------------+--------------------+----------------+
| Performing           | Address            | City/State/Zipcode | Phone Number   |
| Organization         |                    |                    |                |
+----------------------+--------------------+--------------------+----------------+
|   [facility]     |   [address] |   [place]  |   [phone] |
| [facility]  |                    | [address]              |                |
| - LABORATORY         |                    |                    |                |
+----------------------+--------------------+--------------------+----------------+
 documented in this encounter
 
 Visit Diagnoses
 
 
+------------------------------------------------------------------------+
| Diagnosis                                                              |
+------------------------------------------------------------------------+
|   Acute on chronic diastolic (congestive) heart failure (HCC)          |
+------------------------------------------------------------------------+
|   Metabolic encephalopathy                                             |
+------------------------------------------------------------------------+
|   Essential (primary) hypertension  Unspecified essential hypertension |
+------------------------------------------------------------------------+
 documented in this encounter

## 2022-07-13 NOTE — PROCEDURES
[facility]    Procedure: IR Procedure Note    Date/Time: 7/13/2022 10:36 AM  Performed by: Mayco Monzon MD  Authorized by: Shanae Richardson MD   IR Fellow Physician: Mayco ACOSTA      UNIVERSAL PROTOCOL   Site Marked: Yes  Prior Images Obtained and Reviewed:  Yes  Required items: Required blood products, implants, devices and special equipment available    Patient identity confirmed:  Verbally with patient, arm band, provided demographic data and hospital-assigned identification number  Patient was reevaluated immediately before administering moderate or deep sedation or anesthesia  Confirmation Checklist:  Patient's identity using two indicators, relevant allergies, procedure was appropriate and matched the consent or emergent situation and correct equipment/implants were available  Time out: Immediately prior to the procedure a time out was called    Universal Protocol: the Joint Commission Universal Protocol was followed    Preparation: Patient was prepped and draped in usual sterile fashion       ANESTHESIA    Anesthesia: Local infiltration  Local Anesthetic:  Lidocaine 1% without epinephrine  Anesthetic Total (mL):  20      SEDATION  Patient Sedated: Yes    Sedation Type:  Moderate (conscious) sedation  Sedation:  Midazolam and fentanyl  Vital signs: Vital signs monitored during sedation    See dictated procedure note for full details.  Findings: 6F Cm tunneled catheter placed.    Specimens: none    Complications: None    Condition: Stable      PROCEDURE    Patient Tolerance:  Patient tolerated the procedure well with no immediate complications  Length of time physician/provider present for 1:1 monitoring during sedation: 30

## 2022-07-13 NOTE — PRE-PROCEDURE
GENERAL PRE-PROCEDURE:   Procedure:  Image-guided tunneled central venous catheter, possible venogram, possible translumbar  Date/Time:  7/13/2022 9:22 AM    Verbal consent obtained?: Yes    Written consent obtained?: Yes    Risks and benefits: Risks, benefits and alternatives were discussed    Consent given by:  Patient  Patient states understanding of procedure being performed: Yes    Patient's understanding of procedure matches consent: Yes    Procedure consent matches procedure scheduled: Yes    Expected level of sedation:  Moderate  Appropriately NPO:  Yes  ASA Class:  2  Mallampati  :  Grade 1- soft palate, uvula, tonsillar pillars, and posterior pharyngeal wall visible  Lungs:  Lungs clear with good breath sounds bilaterally  Heart:  Normal heart sounds and rate  History & Physical reviewed:  History and physical reviewed and no updates needed  Statement of review:  I have reviewed the lab findings, diagnostic data, medications, and the plan for sedation

## 2022-07-13 NOTE — PHARMACY
[facility]  Parenteral ANtibiotic Review at Departure from Acute [facility]     Antimicrobial Stewardship Program - A joint venture between [facility] and  Physicians to optimize antibiotic management.  NOT a formal consult - Restricted Antimicrobial Review     Patient: Parker Acevedo  MRN: [id number]  Allergies: Bactrim [sulfamethoxazole w/trimethoprim], Penicillins, Doxycycline, and Vancomycin    Brief Summary: Parker Acevedo is a 51 year old gentleman with PMHx of prior bariatric surgery c/b short gut syndrome (on chronic TPN) and frequent hospitalizations for recurrent CLABSI who was admitted on 7/7/2022 with fevers and concern for a new line (R Cm) infection. Patient was incoherent and disoriented on presentation, though was afebrile and hemodynamically stable. Blood cultures from 7/5 and 7/7 showed polymicrobial growth with Enterobacter cloacae complex, Lactococcus lactis, and Bacillus cereus group. Source of patient's bacteremia thought to be from Cm now s/p removal on 7/8. Blood cultures from 7/8 onward have remained without growth. TTE was negative for vegetations.     Of note, patient's home infusion insurance coverage requires medication administation via CADD pump (also requires this for TPN administration). To simplify outpatient antibiotic administrations, patient was transitioned from cefepime to ertapenem with vancomycin IV remaining on board. Plans in place for patient to complete parenteral antibiotic course as an outpatient.      Antimicrobial Dose/Route/Frequency Duration/Indication Start Date End Date   Ertapenem 1 g/IV/every 24 hours 14 days/bacteremia (polymicrobial) 7/8/2022 (date of line removal, first negative BCx) 7/22/202   Vancomycin IV 1500 mg/IV/every 12 hours 14 days/bacteremia (polymicrobial) 7/8/2022 (date of line removal, first negative BCx) 7/22/2022     Laboratory Tests: CBC with Diff,  CMP, CRP   Lab Monitoring Frequency: 1x week   Therapeutic Drug Monitoring:  (Vancomycin level - goal -600, pharmacy may dose)   Therapeutic Drug Monitoring Frequency: 1x week (May increase serum vancomycin level monitoring frequency with regimen changes, labile renal function, and/or addition of nephrotoxic medications)   Miscellaneous Drug Monitoring: none       Line Type: Cm (Double lumen, placed 7/13/2022)    Reassess Line/Pull Line Date: N/A (chronic TPN)     First Dose Received in Controlled Setting: Yes    Designated Provider: Dr. Chuy Isaac (PCP); if abnormal laboratory results arise, may contact Dr. Horne (ID provider)    Follow-up: Patient does not have outpatient follow-up as not warranted at this time.       Jyothi Burr, PharmD, BCIDP  Pager: [phone]    Vital Signs/Clinical Features:  Vitals  Report        07/11 0700 07/12 0659 07/12 0700  07/13 0659 07/13 0700  07/13 1300   Most Recent      Temp ( F) 98 -  99.4    98.4 -  98.6       98.4 (36.9) 07/12 2228    Pulse 55 -  68    57 -  70    51 -  61     53 07/13 1015    Resp   16    17 -  18    0 -  29     14 07/13 1015    /81 -  122/70    110/75 -  113/74    116/74 -  125/83     123/69 07/13 1015    SpO2 (%) 95 -  100    98 -  100    91 -  100     97 07/13 1015            Labs  Estimated Creatinine Clearance: 171.4 mL/min (A) (based on SCr of 0.62 mg/dL (L)).  Recent Labs   Lab Test 07/08/22  1858 07/09/22  0601 07/10/22  0519 07/11/22  0744 07/12/22  0817 07/13/22  0803   CR 0.73 0.64* 0.62* 0.61* 0.63* 0.62*       Recent Labs   Lab Test 05/18/21  1015 05/19/21  1342 05/25/21  1147 06/01/21  1117 06/09/21  1044 06/14/21  1017 06/29/21  1016 07/13/21  1110 07/08/22  1049 07/09/22  0601 07/10/22  0519 07/11/22  0744 07/12/22  0817 07/13/22  0803   WBC 6.2 6.1   < > 7.3 7.5 8.1 6.9   < > 12.7* 11.7* 8.8 5.5 6.6 10.0   ANEU 3.7 3.5  --  4.3 4.7 4.1 3.1  --   --   --   --   --   --   --    ALYM 1.8 1.7  --  1.9 1.7 2.5 2.7  --   --    --   --   --   --   --    DACIA 0.5 0.6  --  0.9 0.7 1.2 0.9  --   --   --   --   --   --   --    AEOS 0.2 0.2  --  0.1 0.3 0.2 0.2  --   --   --   --   --   --   --    HGB 12.6* 12.5*   < > 12.1* 13.3 12.0* 12.1*   < > 10.1* 10.2* 10.9* 10.9* 12.2* 13.3   HCT 38.8* 38.5*   < > 36.3* 40.6 37.0* 37.4*   < > 31.4* 31.8* 33.8* 34.0* 38.7* 42.3   MCV 98 97   < > 94 96 97 96   < > 99 97 97 96 99 100   * 159   < > 174 169 158 178   < > 96* 97* 124* 133* 178 203    < > = values in this interval not displayed.       Recent Labs   Lab Test 07/08/22  1049 07/09/22  0601 07/10/22  0519 07/11/22  0744 07/12/22  0817 07/13/22  0803   BILITOTAL 0.3 0.3 0.3 0.2 0.2 0.3   ALKPHOS 164* 133* 126 104 102 109   ALBUMIN 3.0* 2.7* 2.9* 3.0* 3.3* 3.5   * 62* 25 15 20 24   * 134* 102* 69* 56* 59*       Recent Labs   Lab Test 09/19/17  1929 09/20/17  0549 09/21/17  0653 09/22/17  0821 09/24/17  1900 10/02/17  1030 01/20/18  1030 01/23/18  0845 02/12/18  1400 06/01/18  1807 06/02/18  0110 05/14/19  1145 05/23/19  1606 07/28/20  1607 07/28/20  1723 03/19/21  1643 03/21/21  0734 11/16/21  1300 01/08/22  1430 01/08/22  1828 02/23/22  1621 02/23/22  2222 02/24/22  0151 03/15/22  1442 05/07/22  1423 05/07/22  1424 07/07/22  1231 07/07/22  1852 07/08/22  1049   PCAL 0.17  --  0.08  --   --   --   --   --   --  0.09  --   --   --   --   --  0.06  --   --   --   --   --   --   --   --  0.20*  --  0.56*  --   --    LACT 1.1  --   --   --   --    < >  --   --   --  0.9   < >  --    < > 1.6   < > 0.9  --   --   --  0.9  --  2.3* 1.0  --   --  0.7  --  1.1 1.0   CRP 57.0*   < > 88.0*   < > 26.6*   < > 5.4 <2.9   < > 26.0*   < >  --    < > <2.9   < >  --    < > <2.9 52.0*  --  <2.9  --   --  <2.9 34.0*  --  47.70*  --   --    SED  --   --   --   --  31*  --  11 10  --   --   --  13  --  10  --   --   --   --   --   --  10  --   --   --   --   --   --   --   --     < > = values in this interval not displayed.       Recent Labs   Lab  Test 07/13/22  0803   VANCOMYCIN 11.8       Culture Results:  7-Day Micro Results       Procedure Component Value Units Date/Time    Blood Culture Hand, Right [[id number]]  (Normal) Collected: 07/09/22 0607    Order Status: Completed Lab Status: Preliminary result Updated: 07/13/22 0704    Specimen: Blood from Hand, Right      Culture No growth after 4 days    Blood Culture Hand, Left [[id number]]  (Normal) Collected: 07/09/22 0601    Order Status: Completed Lab Status: Preliminary result Updated: 07/13/22 0704    Specimen: Blood from Hand, Left      Culture No growth after 4 days    Blood Culture Peripheral Blood     Order Status: Canceled Lab Status: No result     Specimen: Peripheral Blood     Blood Culture Peripheral Blood     Order Status: Canceled Lab Status: No result     Specimen: Peripheral Blood     Foreign Body Aerobic Bacterial Culture Routine [[id number]] Collected: 07/08/22 1725    Order Status: Completed Lab Status: Final result Updated: 07/10/22 1050    Specimen: Foreign Body from Catheter tip      Culture No Growth    Blood Culture Arm, Left [[id number]]  (Normal) Collected: 07/08/22 0646    Order Status: Completed Lab Status: Final result Updated: 07/13/22 0804    Specimen: Blood from Arm, Left      Culture No Growth    Blood Culture Hand, Right [[id number]]  (Normal) Collected: 07/08/22 0640    Order Status: Completed Lab Status: Final result Updated: 07/13/22 0804    Specimen: Blood from Hand, Right      Culture No Growth    Blood Culture Central Venous Line [[id number]]  (Normal) Collected: 07/08/22 0602    Order Status: Completed Lab Status: Final result Updated: 07/13/22 0804    Specimen: Blood from Central Venous Line      Culture No Growth    Blood Culture Peripheral Blood     Order Status: Canceled Lab Status: No result     Specimen: Peripheral Blood     Blood Culture Peripheral Blood     Order Status: Canceled Lab Status: No result     Specimen: Peripheral Blood     Blood  Culture Red Lumen [[id number]]  (Abnormal) Collected: 07/07/22 1323    Order Status: Completed Lab Status: Final result Updated: 07/09/22 0822    Specimen: Blood from Red Lumen      Culture Positive on the 1st day of incubation      Enterobacter cloacae complex     Comment: 2 of 2 bottles  Susceptibilities done on previous cultures           Bacillus cereus group, not anthracis     Comment: 2 of 2 bottles  Identification obtained by MALDI-TOF mass spectrometry research use only database. Test characteristics determined and verified by the [facility].  Susceptibilities done on previous cultures         Lactococcus lactis     Comment: 1 of 2 bottles  Susceptibilities done on previous cultures         Blood Culture Line, Other [[id number]]  (Abnormal) Collected: 07/07/22 1323    Order Status: Completed Lab Status: Final result Updated: 07/09/22 1124    Specimen: Blood from Line, Other      Culture Positive on the 1st day of incubation      Enterobacter cloacae complex     Comment: 2 of 2 bottles  Susceptibilities done on previous cultures         Bacillus cereus group, not anthracis     Comment: 2 of 2 bottles  Identification obtained by MALDI-TOF mass spectrometry research use only database. Test characteristics determined and verified by the [facility].  Susceptibilities done on previous cultures         Lactococcus lactis     Comment: 2 of 2 bottles  Susceptibilities done on previous cultures               Recent Labs   Lab Test 11/03/19  0025 07/28/20  1905 03/19/21  1644 02/25/22  1622 05/07/22  1520 07/07/22  2223   URINEPH 6.0 6.5 7.5* 6.5 6.5 6.0   NITRITE Negative Negative Negative Negative Negative Negative   LEUKEST Negative Negative Negative Negative Negative Negative   WBCU <1 <1 1  --  0 2             Recent Labs   Lab Test 01/08/22  1858   IFLUA Not Detected   FLUAH1 Not Detected   FLUAH3 Not Detected   ER5224 Not Detected   IFLUB Not  Detected   RSVA Not Detected   RSVB Not Detected   PIV1 Not Detected   PIV2 Not Detected   PIV3 Not Detected   HMPV Not Detected             Imaging: XR Chest 2 Views    Result Date: 2022  Chest PA and lateral HISTORY: Positive blood culture COMPARISON STUDY: 2022 FINDINGS: Cardiac silhouette is nonenlarged. Right IJ central line tip in the mid SVC. Small hiatal hernia. There is bibasilar atelectasis and or consolidation. Lower cervical spinal fusion changes.     IMPRESSION: Bibasilar atelectasis and/or pneumonia. MEGAN CANALES MD   SYSTEM ID:  [id number]    Echo Complete    Result Date: 2022  [id number]^SIRI^YOLA  [facility] [address] [place] [id number]  Name: SARA HASSAN MRN: [id number] : 1970 Study Date: 2022 09:25 AM Age: 51 yrs Gender: Male Patient Location: [facility] Reason For Study: Endocarditis Ordering Physician: YOLA HORNER Performed By: Yolanda Velez  BSA: 2.0 m2 Height: 71 in Weight: 175 lb HR: 57 BP: 109/65 mmHg ______________________________________________________________________________ Procedure Complete Portable Echo Adult. Echocardiogram with two-dimensional, color and spectral Doppler performed. ______________________________________________________________________________ Interpretation Summary Left ventricular function is decreased. The ejection fraction is 45-50% (mildly reduced). Global right ventricular function is borderline reduced. No significant valvular abnormalities present. No obvious valvular vegetations seen. Dilation of the inferior vena cava is present with abnormal respiratory variation in diameter. No pericardial effusion is present. ______________________________________________________________________________ Left Ventricle Left ventricular size is normal. Left ventricular wall thickness is normal. Left ventricular function is decreased. The  ejection fraction is 45-50% (mildly reduced). No regional wall motion abnormalities are seen.  Right Ventricle The right ventricle is normal size. Global right ventricular function is borderline reduced.  Atria Both atria appear normal.  Mitral Valve The mitral valve is normal.  Aortic Valve Aortic valve is normal in structure and function.  Tricuspid Valve Trace to mild tricuspid insufficiency is present. The right ventricular systolic pressure is approximated at 17.3 mmHg plus the right atrial pressure. Pulmonary artery systolic pressure is normal.  Pulmonic Valve The pulmonic valve is normal.  Vessels The aorta root is normal. Dilation of the inferior vena cava is present with abnormal respiratory variation in diameter.  Pericardium No pericardial effusion is present.  Compared to Previous Study This study was compared with the study from 5.10.22 . Biventricular function has declined. ______________________________________________________________________________ MMode/2D Measurements & Calculations  IVSd: 0.84 cm LVIDd: 5.3 cm LVIDs: 3.9 cm LVPWd: 0.85 cm FS: 27.4 % LV mass(C)d: 161.9 grams LV mass(C)dI: 81.3 grams/m2 RWT: 0.32 TAPSE: 1.7 cm  Doppler Measurements & Calculations MV E max greta: 73.3 cm/sec MV A max greta: 52.3 cm/sec MV E/A: 1.4 MV dec slope: 578.0 cm/sec2 MV dec time: 0.13 sec TR max greta: 208.0 cm/sec TR max P.3 mmHg E/E' av.7 Lateral E/e': 5.9 Medial E/e': 7.5  ______________________________________________________________________________ Report approved by: Sudhir Valente 2022 10:27 AM       US Abdomen Complete w Doppler Complete    Result Date: 2022  EXAMINATION: US ABDOMEN COMPLETE WITH DOPPLER COMPLETE 2022 8:38 AM COMPARISON: Abdomen and pelvis CT 2019; ultrasound abdomen 2017 HISTORY: Patient with recent history of clot and elevated LFTs, rule out liver obstruction. TECHNIQUE: The abdomen was scanned in standard fashion with specialized ultrasound  transducer(s) using both gray-scale, color Doppler, and spectral flow techniques. Findings: Liver: The liver is enlarged and measures 23.3 cm in greatest craniocaudal dimension. Demonstrates increased periportal echogenicity. No evidence of a focal hepatic mass. Extrahepatic portal vein flow is antegrade at 17.8 cm/s. Right portal vein flow is antegrade, measuring 14.6 cm/s. Left portal vein flow is antegrade, measuring 8.7 cm/s. Flow in the hepatic artery is towards the liver and: 75.8 cm/s peak systolic 0.68 resistive index. The splenic vein is patent and flow is towards the liver.  The left, middle, and right hepatic veins are patent with flow towards the IVC. The IVC is patent with flow towards the heart.   IVC appears prominent The visualized aorta is not dilated. Gallbladder: Cholecystectomy. Bile Ducts: Both the intra- and extrahepatic biliary system are of normal caliber.  The common bile duct measures 9 mm, previously measured approximately 11 mm on the comparison abdomen pelvis CT. Pancreas: Visualized portions of the head and body of the pancreas are unremarkable. Kidneys: Both kidneys are of normal echotexture, without mass or hydronephrosis.   Renal lengths: right- 12.7 cm, left- 12.6 cm. Spleen: The spleen measures 13 cm in length. Fluid: No evidence of ascites or pleural effusions.     Impression: 1.  Hepatomegaly measuring up to 23.3 cm and borderline splenomegaly measuring 13 cm. 2.  Nonspecific increased hepatic periportal echogenicity which may be seen with congestive or inflammatory etiology. 3. Persistentprominent common bile duct measuring up to 9 mm likely represent postcholecystectomy reservoir effect. 4. Patent hepatic vasculature by Doppler evaluation. 5. Prominent appearing IVC, may represent congestive changes. I have personally reviewed the examination and initial interpretation and I agree with the findings. MANUEL CALDWELL MD   SYSTEM ID:  [id number]    US Upper Extremity Venous Duplex  Bilat    Result Date: 7/10/2022  EXAMINATION: DOPPLER VENOUS ULTRASOUND OF BILATERAL UPPER EXTREMITIES, 7/10/2022 9:15 AM COMPARISON: Left upper terminate venous ultrasound 5/9/2022 HISTORY: Increased arm swelling, recent DVT TECHNIQUE:  Gray-scale evaluation with compression, spectral flow, and color Doppler assessment of the deep venous system of both upper extremities. FINDINGS: Nonocclusive thrombus in the right internal jugular vein with patent right to left collateral. Nonocclusive thrombus in the right subclavian and right axillary veins. The right brachial, cephalic, basilic veins are fully compressible. Scattered right upper extremity edema. The left internal jugular vein is fully compressible with normal color Doppler flow. Nonocclusive thrombus in the left medial subclavian vein. The left innominate, mid and lateral subclavian, and axillary veins are patent. The left brachial, basilic, and cephalic veins in the forearm are fully compressible. The cephalic vein in the upper arm is not seen.     IMPRESSION: 1. Nonocclusive deep venous thrombosis in the right internal jugular, right subclavian, and right axillary veins. 2. Nonocclusive deep venous thrombosis in the left medial subclavian vein. [Urgent Result: DVT] Finding was identified on 7/10/2022 10:11 AM. Dr. Medeiros was contacted by Dr. Garcia at 7/10/2022 10:41 AM and verbalized understanding of the urgent finding.  I have personally reviewed the examination and initial interpretation and I agree with the findings. MEGAN CANALES MD   SYSTEM ID:  [id number]    IR CVC Tunnel Placement > 5 Yrs of Age    Result Date: 7/13/2022  CLINICAL HISTORY: Patient requires central venous access for therapy. Tunneled central venous catheter placement requested. ATTENDING : Shanae Fong MD. FELLOW: Mayco ACOSTA CONSENT: Written informed consent was obtained and is documented in the patient record. MEDICATIONS: 1. 4 mg midazolam IV 2. 200 mcg fentanyl IV 3. 20 mL  1% lidocaine for local anesthesia 4. 100 units heparin per lumen NURSING: Patient continuously monitored by trained, independent observer. SEDATION TIME: 30 minutes face-to-face FLUOROSCOPY TIME: 0.4 minutes Dose: 6.0 mGy DESCRIPTION: The left neck and upper chest were prepped and draped in the usual sterile fashion.  Under ultrasound guidance, the left internal jugular vein was identified and the overlying skin was anesthetized and skin dermatotomy was made. Under ultrasound guidance, left internal jugular venipuncture was made with needle. Image saved documenting venipuncture and patency. Needle was exchanged over guidewire for a dilator under fluoroscopic guidance was subsequently placed a peel-away sheath over the wire into the right atrium.  Length to right atrium was measured with guidewire. Guidewire was removed. In the peel-away sheath was covered. The anterior chest skin was anesthetized and incision was made after measurements were taken. A cuffed catheter was subcutaneously tunneled from the anterior chest incision to the internal jugular venipuncture site after path of tunnel was anesthetized. The dilator was exchanged over guidewire for a peel-away sheath. Guidewire was removed. Under fluoroscopic guidance, the catheter was placed through the peel-away sheath. Peel-away sheath was removed.  Final catheter position saved. Both catheter lumens adequately aspirated and flushed. Each lumen was heparin locked. A catheter retaining suture and sterile dressing were applied. The skin dermatotomy site overlying the internal jugular venipuncture was closed with topical adhesive. COMPLICATIONS: No immediate concerns, the patient remained stable throughout the procedure and tolerated it well. ESTIMATED BLOOD LOSS: Minimal     IMPRESSION: Completed image-guided placement of 6 [employer], 26 cm double lumen tunneled central venous catheter via left internal jugular vein. Catheter tip in high right atrium. Aspirates and  flushes freely, heparin locked and ready for immediate use. No complication.     IR CVC Tunnel Removal Right    Result Date: 7/8/2022  Procedures : 1. Right internal jugular tunneled central venous catheter removal. History: Right TCVC removal Comparison: 7/7/2022 Staff: Yaya Thomas MD Fellow/Resident: Deo Baldwin M.D. Monitoring: Patient not sedated Medications: 10  cc 1% lidocaine Complications: None Procedure: The patient understood the limitations, alternatives, and risks of the procedure and requested the procedure be performed. Both written and oral consent were obtained. Physical examination demonstrated no erythema, tenderness, fluctuance, or discharge at the catheter exit site or along the tract. The catheter entry site was prepped and draped in usual sterile fashion. Following infiltration around the catheter with 1% lidocaine catheter was attempted to be pulled however significant resistance encountered. Blunt dissection was used to free the cuff from the subcutaneous tissues. The catheter was then removed intact with maneuvering. Compression was applied over the venotomy site as well as along the tract until hemostasis was achieved. The skin at the exit site was approximated with Steri-Strips. A sterile dressing was applied at the site. The procedure was well tolerated, with no immediate complications. No medications for sedation were administered. No images obtained nor saved. Estimated blood loss: Less than 1 cc. Specimen: None     Impression: right sided tunneled central venous catheter removed intact and without difficulty. The catheter tip sent for culture.     CT Head w/o & w Contrast    Result Date: 7/7/2022  CT HEAD W/O & W CONTRAST 7/7/2022 8:18 PM Provided History: Encephalopathic in the background of sepsis. R/o brain parenchymal process. Not able to tolerate MRI. Comparison: Head CT 7/22/2019. Technique: Using multidetector thin collimation helical acquisition technique, axial,  coronal and sagittal CT images from the skull base to the vertex were obtained without and with intravenous contrast. Contrast: 75 cc Isovue-370 intravenously Findings:  No intracranial hemorrhage. No mass effect. No midline shift. No extra-axial fluid collection. The gray to white matter differentiation of the cerebral hemispheres is preserved. Ventricles are proportionate to the sulci. No sulcal effacement. The basal cisterns are patent. Scattered mucosal thickening in the ethmoid air cells. The mastoid air cells are clear. Orbits appear unremarkable. No acute fracture. Postcontrast images demonstrate no definite suspicious intracranial enhancement. The major intracranial vessels are patent and normal in caliber. Arachnoid granulations in the left transverse sinus.     Impression: No acute intracranial findings or definite suspicious enhancement. I have personally reviewed the examination and initial interpretation and I agree with the findings. REYNOLD GIMENEZ MD   SYSTEM ID:  [id number]

## 2022-07-13 NOTE — PLAN OF CARE
Pt discharged to home with family from [facility] after being treated for a blood infection. Pt is discharging home with new azevedo catheter. Discharge instructions were given, all materials needed were given to patient.

## 2022-07-14 ENCOUNTER — HOME INFUSION (PRE-WILLOW HOME INFUSION) (OUTPATIENT)
Dept: PHARMACY | Facility: CLINIC | Age: 52
End: 2022-07-14

## 2022-07-14 ENCOUNTER — NURSE TRIAGE (OUTPATIENT)
Dept: NURSING | Facility: CLINIC | Age: 52
End: 2022-07-14

## 2022-07-14 ENCOUNTER — MYC MEDICAL ADVICE (OUTPATIENT)
Dept: INTERNAL MEDICINE | Facility: CLINIC | Age: 52
End: 2022-07-14

## 2022-07-14 ENCOUNTER — PATIENT OUTREACH (OUTPATIENT)
Dept: CARE COORDINATION | Facility: CLINIC | Age: 52
End: 2022-07-14

## 2022-07-14 LAB
BACTERIA BLD CULT: NO GROWTH
BACTERIA BLD CULT: NO GROWTH
HBV E AB SERPL QL IA: NEGATIVE
HBV E AG SERPL QL IA: NEGATIVE

## 2022-07-14 ASSESSMENT — ACTIVITIES OF DAILY LIVING (ADL): DEPENDENT_IADLS:: TRANSPORTATION

## 2022-07-14 NOTE — PROGRESS NOTES
Clinic Care Coordination Contact    Clinic Care Coordination Contact  OUTREACH with Post Discharge Assessment    Referral Information:  Referral Source: IP Handoff    Primary Diagnosis: SIRS/Sepsis    Chief Complaint   Patient presents with     Clinic Care Coordination - Post Hospital      [facility] Utilization:   Clinic Utilization  Difficulty keeping appointments:: No  Compliance Concerns: No  No-Show Concerns: No  No PCP office visit in Past Year: No  Utilization    Hospital Admissions  4             ED Visits  8             No Show Count (past year)  5                Current as of: 7/14/2022  7:29 AM            Clinical Concerns:  Current Medical Concerns:  none  Current Behavioral Concerns: none    Education Provided to patient: Discussed patients IP AVS. CC RN asked open ended questions, provided support, resources, and encouragement as needed. Patient/wife will reach out to care team sooner than planned with new questions or concerns.    Health Maintenance Reviewed:    Health Maintenance Due   Topic Date Due     MEDICARE ANNUAL WELLNESS VISIT  06/21/2017     COVID-19 Vaccine (3 - Booster for Moderna series) 01/09/2022     Clinical Pathway: None    Admission:    Admission Date: 07/07/22   Reason for Admission: abnormal labs  Discharge:   Discharge Date: 07/13/22  Discharge Diagnosis: blood stream infection    Enrollment  Primary Care Care Coordination Status: Enrolled  Clinical Pathway Name: None  Outreach Frequency: monthly    Discharge Assessment  How are you doing now that you are home?: CC RN called patients wife Rose whom is on patients C2C. Overall patient is improved. He is resting, and taking it easy. No new concerns since discharge. Home care is coming out today for resumption of care. Writer has been covering for  nurse care coordinator. Writer provided warm hand off to Rose, she is agreeable to working with [facility].  How are your symptoms? (Red Flag symptoms escalate to triage hotline per  guidelines): Improved  Do you feel your condition is stable enough to be safe at home until your provider visit?: Yes  Does the patient have questions regarding their discharge instructions? : Yes (see comment) (writer will send PCP the question)  Were you started on any new medications or were there changes to any of your previous medications? : Yes  Does the patient have all of their medications?: Yes  Do you have questions regarding any of your medications? : No  Do you have all of your needed medical supplies or equipment (DME)?  (i.e. oxygen tank, CPAP, cane, etc.): Yes  Discharge follow-up appointment scheduled within 14 calendar days? : No (PCP follow up appt was not included in IP AVS, CC RN offered to warm transfer call to post hospital scheduling line. Wife declined, she will call at a later time.)  Is patient agreeable to assistance with scheduling? : No    Post-op ([facility] Only)  If the patient had a surgery or procedure, do they have any questions for a nurse?: No    Post-op (Clinicians Only)  Did the patient have surgery or a procedure: Yes  Incision: healing  Drainage: No  Bleeding: none  Fever: No  Chills: No  Redness: No  Warmth: No  Swelling: No  Incision site pain: No  Closure: other (Glue)  Eating & Drinking: eating and drinking without complaints/concerns  PO Intake: regular diet  Bowel Function: normal  Urinary Status: voiding without complaint/concerns    Medication Management:  Medication review status: Medications reviewed and no changes reported per patient/wife.        Functional Status:  Dependent ADLs:: Independent  Dependent IADLs:: Transportation  Bed or wheelchair confined:: No  Mobility Status: Independent    Living Situation:  Current living arrangement:: I live in a private home with spouse  Type of residence:: Private home - stairs    Lifestyle & Psychosocial Needs:    Social Determinants of Health     Tobacco Use: High Risk     Smoking Tobacco Use: Light Tobacco Smoker      Smokeless Tobacco Use: Never Used   Alcohol Use: Not At Risk     Frequency of Alcohol Consumption: Never     Average Number of Drinks: Patient refused     Frequency of Binge Drinking: Never   Financial Resource Strain: Medium Risk     Difficulty of Paying Living Expenses: Somewhat hard   Food Insecurity: No Food Insecurity     Worried About Running Out of Food in the Last Year: Never true     Ran Out of Food in the Last Year: Never true   Transportation Needs: No Transportation Needs     Lack of Transportation (Medical): No     Lack of Transportation (Non-Medical): No   Physical Activity: Not on file   Stress: Not on file   Social Connections: Not on file   Intimate Partner Violence: Not At Risk     Fear of Current or Ex-Partner: No     Emotionally Abused: No     Physically Abused: No     Sexually Abused: No   Depression: Not at risk     PHQ-2 Score: 0   Housing Stability: Not on file     Diet:: Regular  Inadequate nutrition (GOAL):: No  Tube Feeding: No (TPN)  Inadequate activity/exercise (GOAL):: No  Significant changes in sleep pattern (GOAL): No  Transportation means:: Regular car, Friend     Mandaen or spiritual beliefs that impact treatment:: No  Mental health DX:: Yes  Mental health management concern (GOAL):: No  Informal Support system:: Family, Spouse     Resources and Interventions:  Current Resources:   Skilled Home Care Services: Skilled Nursing  Community Resources: Infusion Services, Home Care  Supplies Currently Used at Home: Other (TPN supplies)  Equipment Currently Used at Home: cane, straight  Employment Status: disabled     Advance Care Plan/Directive  Advanced Care Plans/Directives on file:: Yes  Type Advanced Care Plans/Directives: Advanced Directive - On File  Advanced Care Plan/Directive Status: Not Applicable    Referrals Placed: None     Goals:    Goals        General     Medical (pt-stated)      Notes - Note created  5/20/2022  1:52 PM by Jami Blank RN     Goal Statement: I will  complete my annual wellness visit.  Date Goal set: 5/20/22  Barriers: complex care, and high volume of appointments at baseline  Strengths: wife is pts main caregiver, and organizes his appointments.   Date to Achieve By: 9/2022  Patient expressed understanding of goal: yes  Action steps to achieve this goal:  1. I will schedule my annual wellness visit; [phone] ([phone])  2. I will attend my annual wellness visit.  3. I will contact my Care Management or clinic team if I have barriers to attending my annual wellness visit.              Patient/Caregiver understanding: yes    Outreach Frequency: monthly  Future Appointments              In 1 month Natalie Hull APRN [facility] [place], [facility]          Plan: 1. Patient/wife verbalized good understanding of care plans and will follow recommendations.  2. Patient enrolled in Care Coordination, goal(s) identified at this time.   3. New Lead CC RNKinsey will reach out to patient in 1 month, if additional need(s) arise patient encouraged to contact CC RN or care team directly sooner.     Jami Blank RN, BSN, PHN Care Coordinator  [facility]   Phone: [phone]

## 2022-07-14 NOTE — PROGRESS NOTES
Clinic Care Coordination Contact  Care Team Conversations    Dear Dr. Isaac,    Wife reports she was verbally advised to follow up with hematology in 1-3 months by IP team. Patients IP AVS does not reflect this advice. Would need a referral if PCP feels patient needs hematology follow up on blood clots/anticoagulation treatment.     Routing to PCP for further comment.    Thank you,   Jami Blank RN, BSN, PHN Care Coordinator  [place], [place], [facility]   Phone: [phone]

## 2022-07-14 NOTE — TELEPHONE ENCOUNTER
"Discharged yesterday afternoon from hospital.   He took his antibiotics, same as ones given in the hospital, but now they are making him nauseated.   He has not vomited yet. 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 Very little p.o. intake.   DX with pneumonia and blood infection. One of his antibiotics ran in about 10 min--it was supposed to last an hour. Ertapenem 1mg IV every 24 hrs for 9 days. Diluted in an IV bag: small bag. 11am dose. He began feeling nauseated right afterwards. He took Zofran, twice and it is not helping. He ate 1/4 sandwhich and it did not help. His gut hurts and his back aches. This began after the infusion, Vancomycin and Lovenox. Pain=\"8\". He has taken Oxycodone and it is not helping. T98.9 orally.   PCP Dr RODRIGUEZ Isaac @ [facility].   DR Omar Wren on call, paged via am com @ 7:12pm. Patient needs to be evaluated in the ER.   Contacted wife with this information, she will bring him into the ER now.     Arabella Mcneill RN Triage Nurse Advisor 7:17 PM 7/14/2022     Reason for Disposition    Taking prescription medication that could cause nausea (e.g., narcotics/opiates, antibiotics, OCPs, many others)    Additional Information    Negative: Shock suspected (e.g., cold/pale/clammy skin, too weak to stand, low BP, rapid pulse)    Negative: Sounds like a life-threatening emergency to the triager    Negative: [1] Nausea or vomiting AND [2] pregnancy < 20 weeks    Negative: Menstrual Period - Missed or Late (i.e., pregnancy suspected)    Negative: Heat exhaustion suspected (i.e., dehydration from heat exposure)    Negative: Motion sickness suspected (i.e., nausea with car, plane, boat, or train travel)    Negative: Anxiety or stress suspected (i.e., nausea with anxiety attacks or stressful situations)    Negative: Traumatic Brain Injury (TBI) suspected    Negative: Nausea (or Vomiting) in a cancer patient who is currently (or " recently) receiving chemotherapy or radiation therapy, or cancer patient who has metastatic or end-stage cancer and is receiving palliative care    Negative: Vomiting occurs    Negative: Other symptom is present, see that guideline.  (e.g., chest pain, headache, dizziness, abdominal pain, colds, sore throat, etc.).    Negative: Unable to walk, or can only walk with assistance (e.g., requires support)    Negative: Difficulty breathing    Negative: [1] Insulin-dependent diabetes (Type I) AND [2] glucose > 400 mg/dl (22 mmol/l)    Negative: [1] Drinking very little AND [2] dehydration suspected (e.g., no urine > 12 hours, very dry mouth, very lightheaded)    Negative: Patient sounds very sick or weak to the triager    Negative: Fever > 104 F (40 C)    Negative: [1] Fever > 101 F (38.3 C) AND [2] age > 60    Negative: [1] Fever > 100.0 F (37.8 C) AND [2] bedridden (e.g., nursing home patient, CVA, chronic illness, recovering from surgery)    Negative: [1] Fever > 100.0 F (37.8 C) AND [2] diabetes mellitus or weak immune system (e.g., HIV positive, cancer chemo, splenectomy, organ transplant, chronic steroids)    Negative: Taking any of the following medications: digoxin (Lanoxin), lithium, theophylline, phenytoin (Dilantin)    Negative: Yellowish color of the skin or white of the eye (i.e., jaundice)    Negative: Fever present > 3 days (72 hours)    Negative: Receiving cancer chemotherapy medication    Protocols used: [facility]  COVID 19 Nurse Triage Plan/Patient Instructions    Please be aware that novel coronavirus (COVID-19) may be circulating in the community. If you develop symptoms such as fever, cough, or SOB or if you have concerns about the presence of another infection including coronavirus (COVID-19), please contact your health care provider or visit [email or web address].     Disposition/Instructions    ED Visit recommended. Follow protocol based instructions.     Bring Your Own Device:  Please also  bring your smart device(s) (smart phones, tablets, laptops) and their charging cables for your personal use and to communicate with your care team during your visit.    Thank you for taking steps to prevent the spread of this virus.  o Limit your contact with others.  o Wear a simple mask to cover your cough.  o Wash your hands well and often.    Resources    [facility]: About COVID-19: [email or web address]    CDC: What to Do If You're Sick: www.cdc.gov/coronavirus/2019-ncov/about/steps-when-sick.html    CDC: Ending Home Isolation: www.cdc.gov/coronavirus/2019-ncov/hcp/disposition-in-home-patients.html     CDC: Caring for Someone: www.cdc.gov/coronavirus/2019-ncov/if-you-are-sick/care-for-someone.html     [facility]: Interim Guidance for Hospital Discharge to Home: www.health.[facility].[place]/diseases/coronavirus/hcp/hospdischarge.pdf    [facility] clinical trials (COVID-19 research studies): [email or web address].[email or web address]-clinical-trials     Below are the COVID-19 hotlines at the [place] Department of Health ([facility]). Interpreters are available.   o For health questions: Call 712-386-0896 or 1-943.668.5392 (7 a.m. to 7 p.m.)  o For questions about schools and childcare: Call 250-580-3175 or 1-926.462.1687 (7 a.m. to 7 p.m.)

## 2022-07-15 ENCOUNTER — TELEPHONE (OUTPATIENT)
Dept: INTERNAL MEDICINE | Facility: CLINIC | Age: 52
End: 2022-07-15

## 2022-07-15 RX ORDER — CARVEDILOL 6.25 MG/1
12.5 TABLET ORAL 2 TIMES DAILY WITH MEALS
Qty: 60 TABLET | Refills: 3 | Status: SHIPPED | OUTPATIENT
Start: 2022-07-15 | End: 2023-01-30

## 2022-07-15 NOTE — TELEPHONE ENCOUNTER
I am not sure why he needs this consult.  The discharge summary is not completed.  He can have a follow-up visit with me at the next available appointment and we can discuss it

## 2022-07-15 NOTE — TELEPHONE ENCOUNTER
Reason for Call:  Other prescription Clarification    Detailed comments:   Patient's medication  carvedilol (COREG) 6.25 MG tablet    On  07/13/2022 was 12.5 BID    Today 07/15/2022 this medication was called in with new dosage    Needing Dr. Isaac to call into [facility] and speak to Aubrie    Phone#  [phone] 60 tablet 3 7/15/2022       Best Time:   Anytime    Can we leave a detailed message on this number?   YES    Call taken on 7/15/2022 at 9:19 AM by Chiquis Fishman     [facility]

## 2022-07-15 NOTE — TELEPHONE ENCOUNTER
Coreg  Prescription approved per [facility] Refill Protocol.    Ella Hammond RN

## 2022-07-16 ENCOUNTER — DOCUMENTATION ONLY (OUTPATIENT)
Dept: INFECTIOUS DISEASES | Facility: CLINIC | Age: 52
End: 2022-07-16

## 2022-07-16 ENCOUNTER — HOME INFUSION (PRE-WILLOW HOME INFUSION) (OUTPATIENT)
Dept: PHARMACY | Facility: CLINIC | Age: 52
End: 2022-07-16

## 2022-07-16 ENCOUNTER — HOSPITAL ENCOUNTER (EMERGENCY)
Facility: CLINIC | Age: 52
Discharge: HOME | End: 2022-07-17
Attending: FAMILY MEDICINE | Admitting: FAMILY MEDICINE
Payer: COMMERCIAL

## 2022-07-16 DIAGNOSIS — R78.81 BACTEREMIA: ICD-10-CM

## 2022-07-16 DIAGNOSIS — E87.6 HYPOKALEMIA: ICD-10-CM

## 2022-07-16 DIAGNOSIS — R50.9 FEVER IN ADULT: ICD-10-CM

## 2022-07-16 LAB
ALBUMIN SERPL-MCNC: 2.9 G/DL (ref 3.4–5)
ALBUMIN UR-MCNC: NEGATIVE MG/DL
ALP SERPL-CCNC: 91 U/L (ref 40–150)
ALT SERPL W P-5'-P-CCNC: 35 U/L (ref 0–70)
ANION GAP SERPL CALCULATED.3IONS-SCNC: 3 MMOL/L (ref 3–14)
APPEARANCE UR: CLEAR
AST SERPL W P-5'-P-CCNC: 11 U/L (ref 0–45)
BASOPHILS # BLD AUTO: 0 10E3/UL (ref 0–0.2)
BASOPHILS NFR BLD AUTO: 0 %
BILIRUB SERPL-MCNC: 0.3 MG/DL (ref 0.2–1.3)
BILIRUB UR QL STRIP: NEGATIVE
BUN SERPL-MCNC: 6 MG/DL (ref 7–30)
CALCIUM SERPL-MCNC: 8.5 MG/DL (ref 8.5–10.1)
CHLORIDE BLD-SCNC: 107 MMOL/L (ref 94–109)
CO2 SERPL-SCNC: 30 MMOL/L (ref 20–32)
COLOR UR AUTO: YELLOW
CREAT SERPL-MCNC: 0.66 MG/DL (ref 0.66–1.25)
CRP SERPL-MCNC: 4.4 MG/L (ref 0–8)
EOSINOPHIL # BLD AUTO: 0.1 10E3/UL (ref 0–0.7)
EOSINOPHIL NFR BLD AUTO: 2 %
ERYTHROCYTE [DISTWIDTH] IN BLOOD BY AUTOMATED COUNT: 14.5 % (ref 10–15)
ERYTHROCYTE [SEDIMENTATION RATE] IN BLOOD BY WESTERGREN METHOD: 18 MM/HR (ref 0–15)
FLUAV RNA SPEC QL NAA+PROBE: NEGATIVE
FLUBV RNA RESP QL NAA+PROBE: NEGATIVE
GFR SERPL CREATININE-BSD FRML MDRD: >90 ML/MIN/1.73M2
GLUCOSE BLD-MCNC: 90 MG/DL (ref 70–99)
GLUCOSE UR STRIP-MCNC: NEGATIVE MG/DL
HBV DNA SERPL NAA+PROBE-ACNC: NOT DETECTED IU/ML
HCT VFR BLD AUTO: 33.5 % (ref 40–53)
HGB BLD-MCNC: 11.3 G/DL (ref 13.3–17.7)
HGB UR QL STRIP: NEGATIVE
HOLD SPECIMEN: NORMAL
IMM GRANULOCYTES # BLD: 0 10E3/UL
IMM GRANULOCYTES NFR BLD: 0 %
KETONES UR STRIP-MCNC: 5 MG/DL
LACTATE SERPL-SCNC: 0.6 MMOL/L (ref 0.7–2)
LEUKOCYTE ESTERASE UR QL STRIP: NEGATIVE
LYMPHOCYTES # BLD AUTO: 1.9 10E3/UL (ref 0.8–5.3)
LYMPHOCYTES NFR BLD AUTO: 28 %
MAGNESIUM SERPL-MCNC: 1.7 MG/DL (ref 1.6–2.3)
MCH RBC QN AUTO: 31.8 PG (ref 26.5–33)
MCHC RBC AUTO-ENTMCNC: 33.7 G/DL (ref 31.5–36.5)
MCV RBC AUTO: 94 FL (ref 78–100)
MONOCYTES # BLD AUTO: 0.8 10E3/UL (ref 0–1.3)
MONOCYTES NFR BLD AUTO: 11 %
MUCOUS THREADS #/AREA URNS LPF: PRESENT /LPF
NEUTROPHILS # BLD AUTO: 4 10E3/UL (ref 1.6–8.3)
NEUTROPHILS NFR BLD AUTO: 59 %
NITRATE UR QL: NEGATIVE
NRBC # BLD AUTO: 0 10E3/UL
NRBC BLD AUTO-RTO: 0 /100
PH UR STRIP: 8 [PH] (ref 5–7)
PLATELET # BLD AUTO: 252 10E3/UL (ref 150–450)
POTASSIUM BLD-SCNC: 3.1 MMOL/L (ref 3.4–5.3)
PROT SERPL-MCNC: 6.2 G/DL (ref 6.8–8.8)
RBC # BLD AUTO: 3.55 10E6/UL (ref 4.4–5.9)
RBC URINE: <1 /HPF
SARS-COV-2 RNA RESP QL NAA+PROBE: NEGATIVE
SODIUM SERPL-SCNC: 140 MMOL/L (ref 133–144)
SP GR UR STRIP: 1.01 (ref 1–1.03)
UROBILINOGEN UR STRIP-MCNC: NORMAL MG/DL
WBC # BLD AUTO: 6.8 10E3/UL (ref 4–11)
WBC URINE: 1 /HPF

## 2022-07-16 PROCEDURE — 80053 COMPREHEN METABOLIC PANEL: CPT | Performed by: FAMILY MEDICINE

## 2022-07-16 PROCEDURE — 81001 URINALYSIS AUTO W/SCOPE: CPT | Performed by: FAMILY MEDICINE

## 2022-07-16 PROCEDURE — 85014 HEMATOCRIT: CPT | Performed by: FAMILY MEDICINE

## 2022-07-16 PROCEDURE — 82040 ASSAY OF SERUM ALBUMIN: CPT | Performed by: FAMILY MEDICINE

## 2022-07-16 PROCEDURE — 250N000011 HC RX IP 250 OP 636: Performed by: FAMILY MEDICINE

## 2022-07-16 PROCEDURE — 99285 EMERGENCY DEPT VISIT HI MDM: CPT | Mod: 25 | Performed by: FAMILY MEDICINE

## 2022-07-16 PROCEDURE — 86140 C-REACTIVE PROTEIN: CPT | Performed by: FAMILY MEDICINE

## 2022-07-16 PROCEDURE — 99285 EMERGENCY DEPT VISIT HI MDM: CPT | Performed by: FAMILY MEDICINE

## 2022-07-16 PROCEDURE — 85652 RBC SED RATE AUTOMATED: CPT | Performed by: FAMILY MEDICINE

## 2022-07-16 PROCEDURE — 87636 SARSCOV2 & INF A&B AMP PRB: CPT | Performed by: FAMILY MEDICINE

## 2022-07-16 PROCEDURE — 96367 TX/PROPH/DG ADDL SEQ IV INF: CPT | Performed by: FAMILY MEDICINE

## 2022-07-16 PROCEDURE — 258N000003 HC RX IP 258 OP 636: Performed by: FAMILY MEDICINE

## 2022-07-16 PROCEDURE — 87040 BLOOD CULTURE FOR BACTERIA: CPT | Performed by: FAMILY MEDICINE

## 2022-07-16 PROCEDURE — 83605 ASSAY OF LACTIC ACID: CPT | Performed by: FAMILY MEDICINE

## 2022-07-16 PROCEDURE — 83735 ASSAY OF MAGNESIUM: CPT | Performed by: FAMILY MEDICINE

## 2022-07-16 PROCEDURE — 36415 COLL VENOUS BLD VENIPUNCTURE: CPT | Performed by: FAMILY MEDICINE

## 2022-07-16 PROCEDURE — 96376 TX/PRO/DX INJ SAME DRUG ADON: CPT | Performed by: FAMILY MEDICINE

## 2022-07-16 PROCEDURE — 96375 TX/PRO/DX INJ NEW DRUG ADDON: CPT | Performed by: FAMILY MEDICINE

## 2022-07-16 PROCEDURE — C9803 HOPD COVID-19 SPEC COLLECT: HCPCS | Performed by: FAMILY MEDICINE

## 2022-07-16 RX ORDER — DIPHENHYDRAMINE HYDROCHLORIDE 50 MG/ML
50 INJECTION INTRAMUSCULAR; INTRAVENOUS ONCE
Status: COMPLETED | OUTPATIENT
Start: 2022-07-16 | End: 2022-07-16

## 2022-07-16 RX ORDER — ONDANSETRON 2 MG/ML
4 INJECTION INTRAMUSCULAR; INTRAVENOUS EVERY 30 MIN PRN
Status: DISCONTINUED | OUTPATIENT
Start: 2022-07-16 | End: 2022-07-17 | Stop reason: HOSPADM

## 2022-07-16 RX ORDER — HEPARIN SODIUM (PORCINE) LOCK FLUSH IV SOLN 100 UNIT/ML 100 UNIT/ML
5-10 SOLUTION INTRAVENOUS
Status: COMPLETED | OUTPATIENT
Start: 2022-07-16 | End: 2022-07-17

## 2022-07-16 RX ORDER — HYDROMORPHONE HYDROCHLORIDE 1 MG/ML
0.5 INJECTION, SOLUTION INTRAMUSCULAR; INTRAVENOUS; SUBCUTANEOUS
Status: DISCONTINUED | OUTPATIENT
Start: 2022-07-16 | End: 2022-07-17 | Stop reason: HOSPADM

## 2022-07-16 RX ORDER — SODIUM CHLORIDE 9 MG/ML
INJECTION, SOLUTION INTRAVENOUS CONTINUOUS
Status: DISCONTINUED | OUTPATIENT
Start: 2022-07-16 | End: 2022-07-17 | Stop reason: HOSPADM

## 2022-07-16 RX ORDER — ENOXAPARIN SODIUM 100 MG/ML
80 INJECTION SUBCUTANEOUS ONCE
Status: COMPLETED | OUTPATIENT
Start: 2022-07-16 | End: 2022-07-17

## 2022-07-16 RX ADMIN — ONDANSETRON 4 MG: 2 INJECTION INTRAMUSCULAR; INTRAVENOUS at 22:22

## 2022-07-16 RX ADMIN — Medication 5 ML: at 22:04

## 2022-07-16 RX ADMIN — HYDROMORPHONE HYDROCHLORIDE 0.5 MG: 1 INJECTION, SOLUTION INTRAMUSCULAR; INTRAVENOUS; SUBCUTANEOUS at 22:54

## 2022-07-16 RX ADMIN — SODIUM CHLORIDE 1000 ML: 9 INJECTION, SOLUTION INTRAVENOUS at 22:19

## 2022-07-16 RX ADMIN — HYDROMORPHONE HYDROCHLORIDE 0.5 MG: 1 INJECTION, SOLUTION INTRAMUSCULAR; INTRAVENOUS; SUBCUTANEOUS at 22:19

## 2022-07-16 RX ADMIN — CEFEPIME HYDROCHLORIDE 2 G: 2 INJECTION, POWDER, FOR SOLUTION INTRAVENOUS at 23:39

## 2022-07-16 RX ADMIN — DIPHENHYDRAMINE HYDROCHLORIDE 50 MG: 50 INJECTION, SOLUTION INTRAMUSCULAR; INTRAVENOUS at 23:37

## 2022-07-16 NOTE — PROGRESS NOTES
Miscellaneous note:    I received a call from home infusion team informing me that the patient is not tolerating ertapenem and he states he does not want that antibiotic anymore. He is currently on ertapenem and vancomycin.    Given the above, I recommended the followin. Stop ertapanem  2. Start cefepime 2 grams IV q 8h  3. Continue vancomycin    End date of treatment continues to be 22    [facility]  22

## 2022-07-17 ENCOUNTER — HOME INFUSION (PRE-WILLOW HOME INFUSION) (OUTPATIENT)
Dept: PHARMACY | Facility: CLINIC | Age: 52
End: 2022-07-17

## 2022-07-17 VITALS
SYSTOLIC BLOOD PRESSURE: 134 MMHG | OXYGEN SATURATION: 99 % | RESPIRATION RATE: 20 BRPM | WEIGHT: 170 LBS | HEART RATE: 48 BPM | BODY MASS INDEX: 23.71 KG/M2 | DIASTOLIC BLOOD PRESSURE: 84 MMHG | TEMPERATURE: 98.7 F

## 2022-07-17 PROCEDURE — 96365 THER/PROPH/DIAG IV INF INIT: CPT | Performed by: FAMILY MEDICINE

## 2022-07-17 PROCEDURE — 96372 THER/PROPH/DIAG INJ SC/IM: CPT | Performed by: FAMILY MEDICINE

## 2022-07-17 PROCEDURE — 96366 THER/PROPH/DIAG IV INF ADDON: CPT | Performed by: FAMILY MEDICINE

## 2022-07-17 PROCEDURE — 258N000003 HC RX IP 258 OP 636: Performed by: FAMILY MEDICINE

## 2022-07-17 PROCEDURE — 250N000011 HC RX IP 250 OP 636: Performed by: FAMILY MEDICINE

## 2022-07-17 PROCEDURE — 250N000013 HC RX MED GY IP 250 OP 250 PS 637: Performed by: FAMILY MEDICINE

## 2022-07-17 RX ORDER — LORAZEPAM 1 MG/1
1 TABLET ORAL ONCE
Status: COMPLETED | OUTPATIENT
Start: 2022-07-17 | End: 2022-07-17

## 2022-07-17 RX ADMIN — VANCOMYCIN HYDROCHLORIDE 1500 MG: 1 INJECTION, POWDER, LYOPHILIZED, FOR SOLUTION INTRAVENOUS at 00:16

## 2022-07-17 RX ADMIN — ENOXAPARIN SODIUM 80 MG: 80 INJECTION SUBCUTANEOUS at 00:08

## 2022-07-17 RX ADMIN — Medication 10 ML: at 01:51

## 2022-07-17 RX ADMIN — PROCHLORPERAZINE EDISYLATE 10 MG: 5 INJECTION INTRAMUSCULAR; INTRAVENOUS at 00:58

## 2022-07-17 RX ADMIN — LORAZEPAM 1 MG: 1 TABLET ORAL at 00:57

## 2022-07-17 NOTE — ED TRIAGE NOTES
Patient states he was discharged from [facility] 3 days ago. C/o fever, low back pain and mid epigastric pain today.     Triage Assessment     Row Name 07/16/22 8125       Triage Assessment (Adult)    Airway WDL WDL       Respiratory WDL    Respiratory WDL WDL

## 2022-07-17 NOTE — DISCHARGE INSTRUCTIONS
Continue with your IV vancomycin and cefepime at home as ordered.  Touch base with your infectious disease specialist on Monday.  We tata another set of cultures.  They can follow those.  Return to the ED if you worsen or have any concerns.  It was nice visiting with you this evening.  I hope you feel better soon.    Thank you for choosing [facility]. We appreciate the opportunity to meet your urgent medical needs. Please let us know if we could have done anything to make your stay more satisfying.    After discharge, please closely monitor for any new or worsening symptoms. Return to the Emergency Department if you develop any acute worsening signs or symptoms.    If you had lab work, cultures or imaging studies done during your stay, the final results may still be pending. We will call you if your plan of care needs to change. However, if you are not improving as expected, please follow up with your primary care provider or clinic.     Start any prescription medications that were prescribed to you and take them as directed.     Please see additional handouts that may be pertinent to your condition.

## 2022-07-17 NOTE — ED PROVIDER NOTES
History     Chief Complaint   Patient presents with     Fever     HPI  Parker Acevedo is a 51 year old male who presents to the ED tonight with recurrent fever.  He was just hospitalized at the [facility] from July 9 to 13 with gram-negative bacteremia related to a central line infection.  The central line was pulled and he had a  Replaced before discharge.  Blood cultures grew out Enterobacter, strep species and bacillus.  He was discharged on the ertapenem every 24 hours and vancomycin every 12 hours.  The ertapenem was causing severe nausea so they were then a switch that to cefepime 2 g IV every 8 hours and his first dose was due tonight.  Plan was to treat him until July 22.    His temp was over 103 when he was originally hospitalized and was trending downwards.  Was afebrile at home and now had a temperature up to 100.7 tonight.  He has been more nauseous and has not able to keep any liquids down.  He is on TPN.    Here with his wife, Rose.      Allergies:  Allergies   Allergen Reactions     Bactrim [Sulfamethoxazole W/Trimethoprim] Rash     Penicillins Anaphylaxis     Please see Antimicrobial Management Team allergy assessment note 10/10/2018. Patient reported tolerating amoxicillin.     Doxycycline Rash     Vancomycin Rash     Rash after receiving vancomycin 3/28/16 (infusion reaction?). Tolerated with slower infusion and diphenhydramine premed.       Problem List:    Patient Active Problem List    Diagnosis Date Noted     Gram-negative bacteremia 07/07/2022     Priority: Medium     Bacteremia associated with intravascular line, initial encounter (H) 05/07/2022     Priority: Medium     Acute deep vein thrombosis (DVT) of axillary vein of right upper extremity (H) 02/28/2022     Priority: Medium     Fever, unknown origin 03/19/2021     Priority: Medium     Short bowel syndrome 01/30/2021     Priority: Medium     Bloodstream infection associated with central venous catheter, initial  encounter 01/30/2021     Priority: Medium     Gastric outlet obstruction 10/07/2020     Priority: Medium     Added automatically from request for surgery [id number]       Gram-positive bacteremia 09/29/2019     Priority: Medium     On total parenteral nutrition 09/29/2019     Priority: Medium     Added automatically from request for surgery [id number]       PICC line infiltration, sequela 07/22/2019     Priority: Medium     Infection by Candida species 01/04/2019     Priority: Medium     Bacteremia 10/10/2018     Priority: Medium     Hyperlipidemia LDL goal <130 08/07/2018     Priority: Medium     S/P bariatric surgery 07/30/2018     Priority: Medium     Generalized weakness 01/30/2018     Priority: Medium     Catheter-related bloodstream infection (CRBSI) 09/23/2017     Priority: Medium     Low serum iron 09/08/2017     Priority: Medium     Anemia, iron deficiency 09/08/2017     Priority: Medium     Abnormal echocardiogram 04/11/2017     Priority: Medium     Port or reservoir infection, initial encounter 03/16/2017     Priority: Medium     Status post cervical spinal arthrodesis 02/15/2017     Priority: Medium     Short gut syndrome      Priority: Medium     Anxiety      Priority: Medium     Chronic nausea 05/10/2016     Priority: Medium     Fungemia 04/11/2016     Priority: Medium     Positive blood culture 03/29/2016     Priority: Medium     Insomnia 08/13/2015     Priority: Medium     Chronic pain 07/07/2015     Priority: Medium     Patient is followed by Dr. Milligan for ongoing prescription of pain medication.  All refills should be approved by this provider, or covering partner.    Medication(s): Fentanyl 50 mcg patches every three days/ Liquid oxycodone 5 mg/5ml up to 50 mg daily.   Maximum quantity per month: as per Dr. Milligan through [facility]  Clinic visit frequency required: Q 3 months at Pain Management    Controlled substance agreement on file: Yes       Date(s): Through Pain Management    Pain  Clinic evaluation in the past: Yes       Date(s):  Ongoing every three months       Location(s):  Per pain management    DIRE Total Score(s):  No flowsheet data found.    Last [facility] website verification:  Through Pain Management   [email or web address]    Esteban Daly MD             Health Care Home 02/24/2015     Priority: Medium              Iron deficiency 05/23/2014     Priority: Medium     Vitamin D deficiency 05/22/2014     Priority: Medium     Former smoker 02/24/2014     Priority: Medium     Bile reflux esophagitis 10/16/2013     Priority: Medium     Constipation 10/01/2013     Priority: Medium     Chronic anxiety 09/25/2013     Priority: Medium     Dysphagia 09/17/2013     Priority: Medium     Weight loss, non-intentional 09/17/2013     Priority: Medium     Malnutrition (H) 09/17/2013     Priority: Medium     Dehydration 08/28/2013     Priority: Medium     Vitamin B12 deficiency without anemia 07/31/2013     Priority: Medium     Diagnosis updated by automated process. Provider to review and confirm.       Thiamine deficiency 07/31/2013     Priority: Medium     ADHD (attention deficit hyperactivity disorder), inattentive type 07/02/2013     Priority: Medium     Patient is followed by RICCO SHARP for ongoing prescription of stimulants.  All refills should be approved by this provider, or covering partner.    Medication(s): Adderall.   Maximum quantity per month: 30  Clinic visit frequency required:      Controlled substance agreement on file: No  Neuropsych evaluation for ADD completed:  No    Last [facility] website verification:  done on 5/6/19  [email or web address]         Anemia 09/20/2012     Priority: Medium     Vomiting 06/28/2012     Priority: Medium     Chronic abdominal pain 06/01/2012     Priority: Medium     Patient is followed by ALEXANDRIA WHITLEY for ongoing prescription of narcotic pain medicine.  Med: liquid oxycodone up to 60 mg per day.   Maximum use per month:    Expected duration: ongoing, hope per surgery that will resolve with upcoming surgery  Narcotic agreement on file: YES  Clinic visit recommended: Q 3 months         Peptic ulcer disease 04/08/2010     Priority: Medium     Gastric bypass status for obesity 04/08/2010     Priority: Medium     Top weight of 492.          Past Medical History:    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)      Chronic abdominal pain      CLABSI (central line-associated bloodstream infection)      Complication of anesthesia      Difficulty swallowing      Gastric ulcer, unspecified as acute or chronic, without mention of hemorrhage, perforation, or obstruction      Gastro-oesophageal reflux disease      Head injury      Hiatal hernia      Other bladder disorder      Other chronic pain      PONV (postoperative nausea and vomiting)      Severe malnutrition (H)      Short gut syndrome      Tobacco abuse        Past Surgical History:    Past Surgical History:   Procedure Laterality Date     AMPUTATION       APPENDECTOMY       BACK SURGERY  11/3/2014    curve in the spine     BIOPSY LYMPH NODE CERVICAL N/A 2/20/2015    Procedure: BIOPSY LYMPH NODE CERVICAL;  Surgeon: Baron Scanlon MD;  [facility]     CHOLECYSTECTOMY       COLONOSCOPY N/A 7/14/2021    Procedure: COLONOSCOPY;  Surgeon: Jimbo Estrada MD;  Location: [facility] OR     COLONOSCOPY N/A 4/13/2022    Procedure: COLONOSCOPY;  Surgeon: Jimbo Estrada MD;  Location: [facility] OR     DISCECTOMY, FUSION CERVICAL ANTERIOR ONE LEVEL, COMBINED N/A 2/15/2017    Procedure: COMBINED DISCECTOMY, FUSION CERVICAL ANTERIOR ONE LEVEL;  Surgeon: Darren Campos MD;  [facility]     ENDOSCOPIC INSERTION TUBE GASTROSTOMY  9/9/2013    Procedure: ENDOSCOPIC INSERTION TUBE GASTROSTOMY;;  Surgeon: Francis Vyas MD;  [facility]     ENDOSCOPIC ULTRASOUND  UPPER GASTROINTESTINAL TRACT (GI)  4/29/2011    Procedure:ENDOSCOPIC ULTRASOUND UPPER GASTROINTESTINAL TRACT (GI); Both Procedures done Conjointly; Surgeon:NEREIDA HOUSER; [facility]     ENDOSCOPIC ULTRASOUND UPPER GASTROINTESTINAL TRACT (GI)  9/9/2013    Procedure: ENDOSCOPIC ULTRASOUND UPPER GASTROINTESTINAL TRACT (GI);  Endoscopic Ultrasound Guide Gastrostomy Tube Placement  C-arm;  Surgeon: Noe Lizarraga MD;  [facility]     ENDOSCOPIC ULTRASOUND UPPER GASTROINTESTINAL TRACT (GI) N/A 2/24/2021    Procedure: ENDOSCOPIC ULTRASOUND, ESOPHAGOSCOPY / UPPER GASTROINTESTINAL TRACT (GI), esophagastrogastroduodenoscopy;  Surgeon: Berny Bach MD;  [facility]     ENDOSCOPY  03/25/11    EGD, [facility]     ENDOSCOPY  08/04/09    Upper Endoscopy, [facility]     ENDOSCOPY  01/05/09    Upper Endoscopy, [facility]     ESOPHAGOSCOPY, GASTROSCOPY, DUODENOSCOPY (EGD), COMBINED  4/20/2011    Procedure:COMBINED ESOPHAGOSCOPY, GASTROSCOPY, DUODENOSCOPY (EGD); Surgeon:BUL VYAS; [facility]     ESOPHAGOSCOPY, GASTROSCOPY, DUODENOSCOPY (EGD), COMBINED  6/15/2011    Procedure:COMBINED ESOPHAGOSCOPY, GASTROSCOPY, DUODENOSCOPY (EGD); Surgeon:BLU VYAS; [facility]     ESOPHAGOSCOPY, GASTROSCOPY, DUODENOSCOPY (EGD), COMBINED  6/12/2013    Procedure: COMBINED ESOPHAGOSCOPY, GASTROSCOPY, DUODENOSCOPY (EGD);;  Surgeon: Blu Vyas MD;  [facility]     ESOPHAGOSCOPY, GASTROSCOPY, DUODENOSCOPY (EGD), COMBINED  11/22/2013    Procedure: COMBINED ESOPHAGOSCOPY, GASTROSCOPY, DUODENOSCOPY (EGD);;  Surgeon: Blu Vyas MD;  [facility]     ESOPHAGOSCOPY, GASTROSCOPY, DUODENOSCOPY (EGD), COMBINED  4/30/2014    Procedure: COMBINED ESOPHAGOSCOPY, GASTROSCOPY, DUODENOSCOPY (EGD);  Surgeon: Blu Vyas MD;  [facility]     ESOPHAGOSCOPY, GASTROSCOPY, DUODENOSCOPY (EGD), COMBINED N/A 2/20/2015    Procedure: COMBINED ESOPHAGOSCOPY,  GASTROSCOPY, DUODENOSCOPY (EGD), BIOPSY SINGLE OR MULTIPLE;  Surgeon: Baron Scanlon MD;  [facility]     ESOPHAGOSCOPY, GASTROSCOPY, DUODENOSCOPY (EGD), COMBINED N/A 9/30/2015    Procedure: COMBINED ESOPHAGOSCOPY, GASTROSCOPY, DUODENOSCOPY (EGD);  Surgeon: Francis Vyas MD;  Location:  GI     ESOPHAGOSCOPY, GASTROSCOPY, DUODENOSCOPY (EGD), COMBINED N/A 10/3/2019    Procedure: Upper Endoscopy;  Surgeon: Clif Morrow MD;  [facility]     GASTRECTOMY  6/22/2012    Procedure: GASTRECTOMY;  Open Approach, Excise Ulcers,Partial Gastrectomy, Esophagojejunostomy, Hiatal Hernia Repair, Extensive Lysis of Adhesions and Esaphagogastrodudenoscopy.;  Surgeon: Francis Vyas MD;  [facility]     GASTROJEJUNOSTOMY  08/26/09    Extensice enterolysis, partial resect. jejunum, part. resect gastric pouch, gastrojejunostomy anastomosis     HC ESOPH/GAS REFLUX TEST W NASAL IMPED ELECTRODE  8/5/2013    Procedure: ESOPHAGEAL IMPEDENCE FUNCTION TEST 1 HOUR OR LESS;  Surgeon: Halie Lang MD;  Location:  GI     HEAD & NECK SURGERY  2/15/2017    C5-C6     HERNIA REPAIR  2006    Umbilical hernia     HERNIORRHAPHY HIATAL  6/22/2012    Procedure: HERNIORRHAPHY HIATAL;;  Surgeon: Francis Vyas MD;  [facility]     HERNIORRHAPHY INGUINAL  11/22/2013    Procedure: HERNIORRHAPHY INGUINAL;;  Surgeon: Francis Vyas MD;  [facility]     INSERT PICC LINE Right 12/19/2019    Procedure: Picc Placement;  Surgeon: Per Dumont PA-C;  [facility]     INSERT PICC LINE Right 2/21/2020    Procedure: INSERTION, PICC;  Surgeon: Per Dumont PA-C;  [facility]     INSERT PORT VASCULAR ACCESS Right 12/19/2017    Procedure: INSERT PORT VASCULAR ACCESS;  Right Chest Port Placement ;  Surgeon: Lisandro Alejandro PA-C;  [facility]     INSERT PORT VASCULAR ACCESS Right 8/2/2018    Procedure: INSERT PORT VASCULAR ACCESS;  Place single lumen tunneled central venous  access catheter;  Surgeon: Guy Jamil PA-C;  [facility]     IR CVC TUNNEL PLACEMENT > 5 YRS OF AGE  8/7/2019     IR CVC TUNNEL PLACEMENT > 5 YRS OF AGE  4/14/2020     IR CVC TUNNEL PLACEMENT > 5 YRS OF AGE  8/3/2020     IR CVC TUNNEL PLACEMENT > 5 YRS OF AGE  9/4/2020     IR CVC TUNNEL PLACEMENT > 5 YRS OF AGE  2/5/2021     IR CVC TUNNEL PLACEMENT > 5 YRS OF AGE  3/23/2021     IR CVC TUNNEL PLACEMENT > 5 YRS OF AGE  1/13/2022     IR CVC TUNNEL PLACEMENT > 5 YRS OF AGE  5/12/2022     IR CVC TUNNEL REMOVAL RIGHT  10/1/2019     IR CVC TUNNEL REMOVAL RIGHT  7/30/2020     IR CVC TUNNEL REMOVAL RIGHT  9/2/2020     IR CVC TUNNEL REMOVAL RIGHT  2/3/2021     IR CVC TUNNEL REMOVAL RIGHT  3/19/2021     IR CVC TUNNEL REMOVAL RIGHT  1/10/2022     IR CVC TUNNEL REMOVAL RIGHT  5/9/2022     IR CVC TUNNEL REVISION RIGHT  5/7/2021     IR FOLLOW UP VISIT OUTPATIENT  8/7/2019     IR PICC PLACEMENT > 5 YRS OF AGE  3/7/2019     IR PICC PLACEMENT > 5 YRS OF AGE  12/19/2019     IR PICC PLACEMENT > 5 YRS OF AGE  2/21/2020     LAPAROTOMY EXPLORATORY  11/22/2013    Procedure: LAPAROTOMY EXPLORATORY;  Exploratory Laparotomy, Upper Endoscopy, Left Inguinal Hernia Repair;  Surgeon: Francis Vyas MD;  [facility]     ORTHOPEDIC SURGERY       PICC INSERTION Right 03/16/2017    5fr DL BioFlo PICC, 42cm (3cm external) in the R medial brachial vein w/ tip in the SVC RA junction.     PICC INSERTION Left 09/23/2017    5fr DL BioFlo PICC, 45cm (1cm external) in the L basilic vein w/ tip in the SVC RA junction.     PICC INSERTION Right 05/16/2019    5Fr - 43cm, Medial brachial vein, low SVC     PICC INSERTION Right 10/02/2019    5Fr - 43cm (2cm external), basilic vein, low SVC     SHAYLEE EN Y BOWEL  2003     SOFT TISSUE SURGERY       THORACIC SURGERY       TONSILLECTOMY       TRANSESOPHAGEAL ECHOCARDIOGRAM INTRAOPERATIVE N/A 1/8/2019    Procedure: TRANSESOPHAGEAL ECHOCARDIOGRAM INTRAOPERATIVE;  Surgeon: GENERIC ANESTHESIA  PROVIDER;  Location:  OR     [facility] GASTRIC BYPASS,OBESE<100CM SHAYLEE-EN-Y  2002    lost 300 pounds       Family History:    Family History   Problem Relation Age of Onset     Gastrointestinal Disease Mother         Crohns disease     Anxiety Disorder Mother      Thyroid Disease Mother         Grave's disease     Cancer Father         ear cancer-skin cancer/melanoma     Breast Cancer Maternal Grandmother      Macular Degeneration Maternal Grandfather      Anxiety Disorder Sister      Diabetes Maternal Uncle      Breast Cancer Other      Hypertension No family hx of      Hyperlipidemia No family hx of      Cerebrovascular Disease No family hx of      Prostate Cancer No family hx of      Depression No family hx of      Anesthesia Reaction No family hx of      Asthma No family hx of      Osteoporosis No family hx of      Genetic Disorder No family hx of      Obesity No family hx of      Mental Illness No family hx of      Substance Abuse No family hx of      Glaucoma No family hx of        Social History:  Marital Status:   [2]  Social History     Tobacco Use     Smoking status: Light Tobacco Smoker     Packs/day: 0.10     Years: 3.00     Pack years: 0.30     Types: Cigarettes     Smokeless tobacco: Never Used     Tobacco comment: 2/4/2021    smokes 3 cigarettes/day   Vaping Use     Vaping Use: Never used   Substance Use Topics     Alcohol use: No     Comment: quit 2002     Drug use: No        Medications:    acetaminophen (TYLENOL) 32 mg/mL liquid  albuterol (PROAIR HFA/PROVENTIL HFA/VENTOLIN HFA) 108 (90 Base) MCG/ACT inhaler  amphetamine-dextroamphetamine (ADDERALL) 20 MG tablet  carvedilol (COREG) 12.5 MG tablet  carvedilol (COREG) 6.25 MG tablet  cyanocobalamin (CYANOCOBALAMIN) 1000 MCG/ML injection  diphenhydrAMINE (BENADRYL) 50 MG capsule  enoxaparin ANTICOAGULANT (LOVENOX) 80 MG/0.8ML syringe  EPINEPHrine (ANY BX GENERIC EQUIV) 0.3 MG/0.3ML injection 2-pack  ertapenem (INVANZ) 1 GM vial  [facility]  "INFUSION MANAGED PATIENT  insulin syringe-needle U-100 (29G X 1/2\" 1 ML) 29G X 1/2\" 1 ML miscellaneous  lactated ringers infusion  lidocaine (LIDODERM) 5 % patch  LORazepam (ATIVAN) 1 MG tablet  naloxone (NARCAN) 4 MG/0.1ML nasal spray  nystatin (MYCOSTATIN) 855990 UNIT/GM external cream  ondansetron (ZOFRAN-ODT) 8 MG ODT tab  oxyCODONE (ROXICODONE) 5 MG/5ML solution  pantoprazole (PROTONIX) 40 MG EC tablet  parenteral nutrition - PTA/DISCHARGE ORDER  polyethylene glycol (MIRALAX) 17 GM/Dose powder  sucralfate (CARAFATE) 1 GM/10ML suspension  vancomycin  vitamin D2 (ERGOCALCIFEROL) 43944 units (1250 mcg) capsule          Review of Systems   All other systems reviewed and are negative.      Physical Exam   BP: (!) 138/92  Pulse: 60  Temp: 99.5  F (37.5  C)  Resp: 20  Weight: 77.1 kg (170 lb)  SpO2: 97 %      Physical Exam  Constitutional:       General: He is in acute distress ( mild).      Appearance: Normal appearance.   HENT:      Mouth/Throat:      Mouth: Mucous membranes are moist.      Pharynx: Oropharynx is clear.   Cardiovascular:      Rate and Rhythm: Normal rate and regular rhythm.      Comments: Left upper chest central line site looks clear.  Pulmonary:      Effort: Pulmonary effort is normal.      Breath sounds: Normal breath sounds.   Abdominal:      Comments: G-tube site looks clear.  It is now just used as a gastric vent tube   Musculoskeletal:         General: Normal range of motion.   Skin:     General: Skin is warm and dry.   Neurological:      General: No focal deficit present.      Mental Status: He is alert and oriented to person, place, and time.         ED Course                 Procedures              Critical Care time:  none               Results for orders placed or performed during the hospital encounter of 07/16/22 (from the past 24 hour(s))   UA with Microscopic reflex to Culture    Specimen: Urine, Midstream   Result Value Ref Range    Color Urine Yellow Colorless, Straw, Light Yellow, " Yellow    Appearance Urine Clear Clear    Glucose Urine Negative Negative mg/dL    Bilirubin Urine Negative Negative    Ketones Urine 5  (A) Negative mg/dL    Specific Gravity Urine 1.012 1.003 - 1.035    Blood Urine Negative Negative    pH Urine 8.0 (H) 5.0 - 7.0    Protein Albumin Urine Negative Negative mg/dL    Urobilinogen Urine Normal Normal, 2.0 mg/dL    Nitrite Urine Negative Negative    Leukocyte Esterase Urine Negative Negative    Mucus Urine Present (A) None Seen /LPF    RBC Urine <1 <=2 /HPF    WBC Urine 1 <=5 /HPF    Narrative    Urine Culture not indicated   CBC with platelets differential    Narrative    The following orders were created for panel order CBC with platelets differential.  Procedure                               Abnormality         Status                     ---------                               -----------         ------                     CBC with platelets and d...[[id number]]  Abnormal            Final result                 Please view results for these tests on the individual orders.   Comprehensive metabolic panel   Result Value Ref Range    Sodium 140 133 - 144 mmol/L    Potassium 3.1 (L) 3.4 - 5.3 mmol/L    Chloride 107 94 - 109 mmol/L    Carbon Dioxide (CO2) 30 20 - 32 mmol/L    Anion Gap 3 3 - 14 mmol/L    Urea Nitrogen 6 (L) 7 - 30 mg/dL    Creatinine 0.66 0.66 - 1.25 mg/dL    Calcium 8.5 8.5 - 10.1 mg/dL    Glucose 90 70 - 99 mg/dL    Alkaline Phosphatase 91 40 - 150 U/L    AST 11 0 - 45 U/L    ALT 35 0 - 70 U/L    Protein Total 6.2 (L) 6.8 - 8.8 g/dL    Albumin 2.9 (L) 3.4 - 5.0 g/dL    Bilirubin Total 0.3 0.2 - 1.3 mg/dL    GFR Estimate >90 >60 mL/min/1.73m2   CRP inflammation   Result Value Ref Range    CRP Inflammation 4.4 0.0 - 8.0 mg/L   Erythrocyte sedimentation rate auto   Result Value Ref Range    Erythrocyte Sedimentation Rate 18 (H) 0 - 15 mm/hr   Lactic acid whole blood   Result Value Ref Range    Lactic Acid 0.6 (L) 0.7 - 2.0 mmol/L   Magnesium   Result Value  Ref Range    Magnesium 1.7 1.6 - 2.3 mg/dL   [place] Draw    Narrative    The following orders were created for panel order [place] Draw.  Procedure                               Abnormality         Status                     ---------                               -----------         ------                     Extra Blue Top Tube[[id number]]                              Final result                 Please view results for these tests on the individual orders.   CBC with platelets and differential   Result Value Ref Range    WBC Count 6.8 4.0 - 11.0 10e3/uL    RBC Count 3.55 (L) 4.40 - 5.90 10e6/uL    Hemoglobin 11.3 (L) 13.3 - 17.7 g/dL    Hematocrit 33.5 (L) 40.0 - 53.0 %    MCV 94 78 - 100 fL    MCH 31.8 26.5 - 33.0 pg    MCHC 33.7 31.5 - 36.5 g/dL    RDW 14.5 10.0 - 15.0 %    Platelet Count 252 150 - 450 10e3/uL    % Neutrophils 59 %    % Lymphocytes 28 %    % Monocytes 11 %    % Eosinophils 2 %    % Basophils 0 %    % Immature Granulocytes 0 %    NRBCs per 100 WBC 0 <1 /100    Absolute Neutrophils 4.0 1.6 - 8.3 10e3/uL    Absolute Lymphocytes 1.9 0.8 - 5.3 10e3/uL    Absolute Monocytes 0.8 0.0 - 1.3 10e3/uL    Absolute Eosinophils 0.1 0.0 - 0.7 10e3/uL    Absolute Basophils 0.0 0.0 - 0.2 10e3/uL    Absolute Immature Granulocytes 0.0 <=0.4 10e3/uL    Absolute NRBCs 0.0 10e3/uL   Extra Blue Top Tube   Result Value Ref Range    Hold Specimen JI    Symptomatic; Unknown Influenza A/B & SARS-CoV2 (COVID-19) Virus PCR Multiplex Nose    Specimen: Nose; Swab   Result Value Ref Range    Influenza A PCR Negative Negative    Influenza B PCR Negative Negative    SARS CoV2 PCR Negative Negative    Narrative    Testing was performed using the humberto SARS-CoV-2 & Influenza A/B Assay on the humberto Bibi System. This test should be ordered for the detection of SARS-CoV-2 and influenza viruses in individuals who meet clinical and/or epidemiological criteria. Test performance is unknown in asymptomatic patients. This test is for in  vitro diagnostic use under the FDA EUA for laboratories certified under CLIA to perform moderate and/or high complexity testing. This test has not been FDA cleared or approved. A negative result does not rule out the presence of PCR inhibitors in the specimen or target RNA in concentration below the limit of detection for the assay. If only one viral target is positive but coinfection with multiple targets is suspected, the sample should be re-tested with another FDA cleared, approved or authorized test, if coinfection would change clinical management. [facility] are certified under the Clinical Laboratory Improvement Amendments of 1988 (CLIA-88) as  qualified to perform moderate and/or high complexity laboratory testing.     *Note: Due to a large number of results and/or encounters for the requested time period, some results have not been displayed. A complete set of results can be found in Results Review.       Medications   ondansetron (ZOFRAN) injection 4 mg (4 mg Intravenous Given 7/16/22 2222)   0.9% sodium chloride BOLUS (0 mLs Intravenous Stopped 7/16/22 2320)     Followed by   sodium chloride 0.9% infusion (has no administration in time range)   HYDROmorphone (PF) (DILAUDID) injection 0.5 mg (0.5 mg Intravenous Given 7/16/22 2254)   sodium chloride (PF) 0.9% PF flush 10-20 mL (10 mLs Intracatheter Given 7/16/22 2203)   sodium chloride (PF) 0.9% PF flush 10-20 mL (has no administration in time range)   sodium chloride (PF) 0.9% PF flush 10-20 mL (10 mLs Intracatheter Given 7/17/22 0150)   ceFEPIme (MAXIPIME) 2 g vial to attach to  ml bag for ADULTS or 50 ml bag for PEDS (0 g Intravenous Stopped 7/17/22 0009)   vancomycin (VANCOCIN) 1,500 mg in sodium chloride 0.9 % 250 mL intermittent infusion (0 mg Intravenous Stopped 7/17/22 0149)   heparin 100 UNIT/ML injection 5-10 mL (10 mLs Intracatheter Given 7/17/22 0151)   diphenhydrAMINE (BENADRYL) injection 50 mg (50 mg Intravenous Given  7/16/22 2337)   enoxaparin ANTICOAGULANT (LOVENOX) injection 80 mg (80 mg Subcutaneous Given 7/17/22 0008)   prochlorperazine (COMPAZINE) injection 10 mg (10 mg Intravenous Given 7/17/22 0058)   LORazepam (ATIVAN) tablet 1 mg (1 mg Oral Given 7/17/22 0057)       Assessments & Plan (with Medical Decision Making)  51-year-old with an extensive past medical history was recently hospitalized with gram-negative bacteremia and throughout multiple organisms and was discharged on IV ertapenem and vancomycin.  Third troponin was causing severe nausea so he was then switched to cefepime and then first doses to be given tonight.  After defervesced seen in the hospital, has now developed a fever of 100.7 tonight at home which prompted his return to the ED.  Also having significant nausea and has been able to keep anything down including his pain or nausea medication.  Will culture both ports on his central line as well as peripheral blood culture.  1 L normal saline, Zofran for nausea.  Dilaudid for pain.  We will plan on giving him his IV antibiotics in the ED since they are due this evening.  White count and lactic acid are normal.  His potassium is slightly low at 3.1 which he states has been an issue for him.  Magnesium was normal.  He gets potassium in his TPN.  Does not absorb well through his got so we elected to hold off on enteral replacement.    He is feeling better after the IV fluids.  He got both his vancomycin and cefepime doses along with his evening Lovenox here in the ED before going home.  I sent a note to Dr Horne, his infectious disease specialist, updating her and asking her to follow-up on his culture results.  He is not septic and clinically looks good.  [place] and he is already set up for home IV antibiotics so keeping him in the hospital really is not going to add anything right now, at least until his cultures come back.  Verbal and written discharge instructions  given.  He is comfortable with this plan.     I have reviewed the nursing notes.    I have reviewed the findings, diagnosis, plan and need for follow up with the patient.       New Prescriptions    No medications on file       Final diagnoses:   Fever in adult   Bacteremia - recently hospitalized for gram negative bacteremia from infected central line   Hypokalemia - mild 3.1       7/16/2022   [facility] EMERGENCY DEPT     Collin Chahal MD  07/17/22 0208

## 2022-07-18 ENCOUNTER — HOME INFUSION (PRE-WILLOW HOME INFUSION) (OUTPATIENT)
Dept: PHARMACY | Facility: CLINIC | Age: 52
End: 2022-07-18

## 2022-07-18 ENCOUNTER — PATIENT OUTREACH (OUTPATIENT)
Dept: CARE COORDINATION | Facility: CLINIC | Age: 52
End: 2022-07-18

## 2022-07-18 ASSESSMENT — ACTIVITIES OF DAILY LIVING (ADL): DEPENDENT_IADLS:: TRANSPORTATION

## 2022-07-18 NOTE — PROGRESS NOTES
This is a recent snapshot of the patient's [facility] medical record.  For current drug dose and complete information and questions, call [phone] or In Basket pool, fv home infusion ([id number])  CSN Number:  [id number]

## 2022-07-18 NOTE — PROGRESS NOTES
Clinic Care Coordination Contact    Follow Up Progress Note      Assessment: CC RN contacted Rose regarding the 7/16 ER visit. Rose (consent to communicate on file). She reports patient does not have a fever today. He is eating and drinking, and [facility] will be out tomorrow. She has the ER AVS, and is following recommendations. No change was made to patients medications. She will follow up with ID today via a phone call. Monitoring patient continually. Patient is resting now.      Care Gaps: goal for AWV is current. CC RN offered to warm transfer call to scheduling line. Wife declined, she will use [employer] to schedule appointment.     Health Maintenance Due   Topic Date Due     MEDICARE ANNUAL WELLNESS VISIT  06/21/2017     COVID-19 Vaccine (3 - Booster for Moderna series) 01/09/2022     Goals addressed this encounter:    Goals Addressed                    This Visit's Progress       General       Medical (pt-stated)   On track      Goal Statement: I will complete my annual wellness visit.  Date Goal set: 5/20/22  Barriers: complex care, and high volume of appointments at baseline  Strengths: wife is pts main caregiver, and organizes his appointments.   Date to Achieve By: 9/2022  Patient expressed understanding of goal: yes  Action steps to achieve this goal:  1. I will schedule my annual wellness visit; [phone] ([phone])  2. I will attend my annual wellness visit.  3. I will contact my Care Management or clinic team if I have barriers to attending my annual wellness visit.              Intervention/Education provided during outreach: Discussed patients plan of care. CC RN asked open ended questions, provided support, resources, and encouragement as needed. Patient will reach out to care team sooner than planned with new questions or concerns.      Plan:   Wife to follow ER AVS.   Lead Care Coordinator will follow up in 1 month.  Jami Blank RN, BSN, PHN Care Coordinator  [place], [place],  and [place]   Phone: [phone]

## 2022-07-18 NOTE — PROGRESS NOTES
This is a recent snapshot of the patient's [facility] medical record.  For current drug dose and complete information and questions, call [phone] or In [facility] pool, fv home infusion ([id number])  CSN Number:  [id number]

## 2022-07-18 NOTE — PROGRESS NOTES
Clinic Care Coordination Contact    CC RN contacted Rose (consent to communicate on file) and communicated Dr. Yeh message below. CC RN offered to warm transfer the call to scheduling. She declined and will schedule via [employer].     Jami Blank RN, BSN, PHN Care Coordinator  [place], [place], and [place]   Phone: [phone]

## 2022-07-19 ENCOUNTER — MYC MEDICAL ADVICE (OUTPATIENT)
Dept: INTERNAL MEDICINE | Facility: CLINIC | Age: 52
End: 2022-07-19

## 2022-07-19 NOTE — PROGRESS NOTES
This is a recent snapshot of the patient's [facility] medical record.  For current drug dose and complete information and questions, call [phone] or In Basket pool, fv home infusion ([id number])  CSN Number:  [id number]

## 2022-07-19 NOTE — PROGRESS NOTES
This is a recent snapshot of the patient's [facility] medical record.  For current drug dose and complete information and questions, call [phone] or In Basket pool, fv home infusion ([id number])  CSN Number:  [id number]

## 2022-07-20 ENCOUNTER — MYC MEDICAL ADVICE (OUTPATIENT)
Dept: INTERNAL MEDICINE | Facility: CLINIC | Age: 52
End: 2022-07-20

## 2022-07-20 ENCOUNTER — HOSPITAL ENCOUNTER (EMERGENCY)
Facility: CLINIC | Age: 52
Discharge: STILL ADMITTED OR ADMINISTRATIVE | End: 2022-07-20
Payer: COMMERCIAL

## 2022-07-20 ENCOUNTER — MYC MEDICAL ADVICE (OUTPATIENT)
Dept: ENDOCRINOLOGY | Facility: CLINIC | Age: 52
End: 2022-07-20

## 2022-07-20 ENCOUNTER — TELEPHONE (OUTPATIENT)
Dept: PALLIATIVE MEDICINE | Facility: CLINIC | Age: 52
End: 2022-07-20

## 2022-07-20 ENCOUNTER — MYC REFILL (OUTPATIENT)
Dept: INTERNAL MEDICINE | Facility: CLINIC | Age: 52
End: 2022-07-20

## 2022-07-20 ENCOUNTER — HOME INFUSION (PRE-WILLOW HOME INFUSION) (OUTPATIENT)
Dept: PHARMACY | Facility: CLINIC | Age: 52
End: 2022-07-20

## 2022-07-20 ENCOUNTER — TELEPHONE (OUTPATIENT)
Dept: INTERNAL MEDICINE | Facility: CLINIC | Age: 52
End: 2022-07-20

## 2022-07-20 DIAGNOSIS — F90.0 ADHD (ATTENTION DEFICIT HYPERACTIVITY DISORDER), INATTENTIVE TYPE: ICD-10-CM

## 2022-07-20 DIAGNOSIS — G89.29 CHRONIC ABDOMINAL PAIN: ICD-10-CM

## 2022-07-20 DIAGNOSIS — G89.4 CHRONIC PAIN SYNDROME: ICD-10-CM

## 2022-07-20 DIAGNOSIS — R10.9 CHRONIC ABDOMINAL PAIN: ICD-10-CM

## 2022-07-20 RX ORDER — FENTANYL 25 UG/1
1 PATCH TRANSDERMAL
Qty: 15 PATCH | Refills: 0 | Status: SHIPPED | OUTPATIENT
Start: 2022-07-20 | End: 2022-08-23

## 2022-07-20 RX ORDER — LORAZEPAM 1 MG/1
TABLET ORAL
Qty: 30 TABLET | Refills: 0 | Status: CANCELLED | OUTPATIENT
Start: 2022-07-20

## 2022-07-20 RX ORDER — OXYCODONE HCL 5 MG/5 ML
5-10 SOLUTION, ORAL ORAL 4 TIMES DAILY PRN
Qty: 1200 ML | Refills: 0 | Status: SHIPPED | OUTPATIENT
Start: 2022-07-20 | End: 2022-08-23

## 2022-07-20 RX ORDER — DEXTROAMPHETAMINE SACCHARATE, AMPHETAMINE ASPARTATE, DEXTROAMPHETAMINE SULFATE AND AMPHETAMINE SULFATE 5; 5; 5; 5 MG/1; MG/1; MG/1; MG/1
TABLET ORAL
Qty: 30 TABLET | Refills: 0 | Status: CANCELLED | OUTPATIENT
Start: 2022-07-20

## 2022-07-20 NOTE — TELEPHONE ENCOUNTER
Signed Prescriptions:                        Disp   Refills    oxyCODONE (ROXICODONE) 5 MG/5ML solution   1200 mL0        Sig: Take 5-10 mLs (5-10 mg) by mouth 4 times daily as           needed for pain Max of 40mg/day. Put at least 4           hours between doses. Fill 08/05/22 and start           08/07/22. 30 day supply for chronic pan  Authorizing Provider: NATALIE MCDOWELL    fentaNYL (DURAGESIC) 25 mcg/hr 72 hr patch 15 pat*0        Sig: Place 1 patch onto the skin every 48 hours remove old           patch. Fill 08/05/22 and start 08/07/22. 30 days           for chronic pain  Authorizing Provider: NATALIE MCDOWELL    Reviewed [place]  July 20, 2022- no concerning fills.    Natalie MENARD, RN CNP, FNP  [facility]  [facility]

## 2022-07-20 NOTE — TELEPHONE ENCOUNTER
Reason for Call:  Form, our goal is to have forms completed with 72 hours, however, some forms may require a visit or additional information.    Type of letter, form or note:  Home Health Certification    Who is the form from?: Home care    Where did the form come from: form was faxed in    What clinic location was the form placed at?: [facility]    Where the form was placed: Given to MA/CHRISTY Rivera  What number is listed as a contact on the form?: [phone]       Additional comments: [facility]     Call taken on 7/20/2022 at 12:50 PM by Kristin Jaimes

## 2022-07-20 NOTE — TELEPHONE ENCOUNTER
Requested Prescriptions   Pending Prescriptions Disp Refills     LORazepam (ATIVAN) 1 MG tablet [Pharmacy Med Name: LORAZEPAM 1MG TABS] 30 tablet 0     Sig: TAKE 1 TABLET (1MG) BY MOUTH ONCE A DAY AS NEEDED FOR ANXIETY WITH TPN AND MEDICATIONS . 30 TO LAST 30 DAYS       There is no refill protocol information for this order        Last Written Prescription Date:  06/24/2022  Last Fill Quantity: 30,   # refills: 0  Last Office Visit: 3/11/2022  Future Office visit:    Next 5 appointments (look out 90 days)    Aug 02, 2022  3:00 PM  (Arrive by 2:40 PM)  Provider Visit with Chuy Isaac MD  [facility] ([facility] ) [address] [place] [id number] [phone]   Aug 23, 2022 10:00 AM  Return Visit with SAILAJA Johnson CNP  [facility] ([facility] ) [address] [place] [id number] [phone]           Routing refill request to provider for review/approval because:  Drug not on the G, P or [facility] refill protocol or controlled substance      Requested Prescriptions   Pending Prescriptions Disp Refills        amphetamine-dextroamphetamine (ADDERALL) 20 MG tablet [Pharmacy Med Name: ADDERALL 20MG TABS] 30 tablet 0     Sig: TAKE ONE TABLET BY MOUTH ONCE DAILY       There is no refill protocol information for this order            Last Written Prescription Date:  06/24/2022  Last Fill Quantity: 30,   # refills: 0  Last Office Visit: 3/11/2022  Future Office visit:    Next 5 appointments (look out 90 days)    Aug 02, 2022  3:00 PM  (Arrive by 2:40 PM)  Provider Visit with Chuy Isaac MD  [facility] ([facility] ) [address] [place] [id number] [phone]   Aug 23, 2022 10:00 AM  Return Visit with SAILAJA Johnson CNP  [facility] ([facility]  [place] ) [address] [facility]  [place] [address]  [phone]           Routing refill request to provider for review/approval because:  Drug not on the [facility], [facility] or [facility] refill protocol or controlled substance

## 2022-07-20 NOTE — TELEPHONE ENCOUNTER
Nursing, please investigate and prep as appropriate.     Natalie MENARD, RN CNP, FNP  [facility]

## 2022-07-20 NOTE — TELEPHONE ENCOUNTER
Medication refill information reviewed. Patient needs Fentanyl 25 mcg patch as well. Last filled 06/29/22 to start 07/01/22 7/7/2022 - 7/13/2022 (6 days) [facility]    Due date for oxyCODONE (ROXICODONE) 5 MG/5ML solution and fentaNYL (DURAGESIC) 25 mcg/hr 72 hr patch is 08/07/22 (start date adjusted 6 days to account for 6 day inpatient stay)     Prescriptions prepped for review.     Will route to provider.

## 2022-07-20 NOTE — TELEPHONE ENCOUNTER
Received call from patient requesting refill(s) of oxyCODONE (ROXICODONE) 5 MG/5ML solution     Last dispensed from pharmacy on 06/29/2022    Patient's last office/virtual visit by prescribing provider on 05/27/2022  Next office/virtual appointment scheduled for 08/23/2022    Last urine drug screen date 01/05/2022  Current opioid agreement on file (completed within the last year) Yes Date of opioid agreement: 06/05/2022    [place] [facility] [place] [address]  pharmacy    Will route to nursing [place] for review and preparation of prescription(s).     Anastasiia Scott MA  [facility]

## 2022-07-20 NOTE — TELEPHONE ENCOUNTER
Refill Request for Fentanyl 25mcg/hr patches    Unable to select this med on Epic med list.  Can't find on his active or inactive med lists.     Has been on:  Fentanyl 25mcg/hr patches - sig:  Place 1 patch onto the skin q48hrs.  Remove old patch.      Last filled/Last picked up on 6/29/22 for #15 patches (30 days supply).    Please send new rx.    Thank you     -Fany John, Pharm.D., [facility], [phone]

## 2022-07-21 ENCOUNTER — HOME INFUSION (PRE-WILLOW HOME INFUSION) (OUTPATIENT)
Dept: PHARMACY | Facility: CLINIC | Age: 52
End: 2022-07-21

## 2022-07-21 DIAGNOSIS — Z78.9 PROBLEM WITH VASCULAR ACCESS: Primary | ICD-10-CM

## 2022-07-21 PROCEDURE — 85048 AUTOMATED LEUKOCYTE COUNT: CPT | Performed by: SURGERY

## 2022-07-21 PROCEDURE — 80202 ASSAY OF VANCOMYCIN: CPT | Performed by: SURGERY

## 2022-07-21 PROCEDURE — 84478 ASSAY OF TRIGLYCERIDES: CPT | Performed by: SURGERY

## 2022-07-21 PROCEDURE — 82248 BILIRUBIN DIRECT: CPT | Performed by: SURGERY

## 2022-07-21 PROCEDURE — 84100 ASSAY OF PHOSPHORUS: CPT | Performed by: SURGERY

## 2022-07-21 PROCEDURE — 83735 ASSAY OF MAGNESIUM: CPT | Performed by: SURGERY

## 2022-07-21 PROCEDURE — 80053 COMPREHEN METABOLIC PANEL: CPT | Performed by: SURGERY

## 2022-07-21 NOTE — PROGRESS NOTES
This is a recent snapshot of the patient's [facility] medical record.  For current drug dose and complete information and questions, call [phone] or In [facility] pool, fv home infusion ([id number])  CSN Number:  [id number]

## 2022-07-21 NOTE — PROGRESS NOTES
Outpatient IR Referral    Patient is a 51 year old male with a history of cardiomyopathy, chronic abdominal pain, GERD, RUE DVT, dysphagia, hyperlipidemia, malnutrition, short gut syndrome s/p baratric surgery with a chronic G tube vent and TPN dependence via CVC with frequent hospitalizations for recurrent CLABSI who was most recently admitted 7/7/2022-7/13/22 with fevers, AMS and recurrent bacteremia. IR removed the RIJ TCVC 7/8 and replaced 7/13/22 on the left, DL. Line is used for TPN and IV medications. Pt's wife reports the line flushes well and the pump for TPN does not alarm but they are unable to aspirate back. [facility] RNs come into the home and experienced the same issue. They tried having Parker reposition and this did not work. Unclear reason the line will not aspirate back.    Recommend line check and possible revision vs TPA instillation/infusion. Ok to use the line for now.     Procedure order placed and IR scheduling updated to call SAILAJA Lebron CNP  Interventional Radiology   IR on-call pager: [phone]

## 2022-07-21 NOTE — TELEPHONE ENCOUNTER
Left message for [facility] Clinical team.    Patient needs [employer] extension sets #BALO [id number] - #15 per month.

## 2022-07-22 LAB
BACTERIA BLD CULT: NO GROWTH

## 2022-07-22 RX ORDER — DEXTROAMPHETAMINE SACCHARATE, AMPHETAMINE ASPARTATE, DEXTROAMPHETAMINE SULFATE AND AMPHETAMINE SULFATE 5; 5; 5; 5 MG/1; MG/1; MG/1; MG/1
TABLET ORAL
Qty: 30 TABLET | Refills: 0 | Status: SHIPPED | OUTPATIENT
Start: 2022-07-22 | End: 2022-08-22

## 2022-07-22 RX ORDER — LORAZEPAM 1 MG/1
TABLET ORAL
Qty: 30 TABLET | Refills: 0 | Status: SHIPPED | OUTPATIENT
Start: 2022-07-22 | End: 2022-08-22

## 2022-07-22 NOTE — PROGRESS NOTES
This is a recent snapshot of the patient's [facility] medical record.  For current drug dose and complete information and questions, call [phone] or In Basket pool, fv home infusion ([id number])  CSN Number:  [id number]

## 2022-07-25 ENCOUNTER — MEDICAL CORRESPONDENCE (OUTPATIENT)
Dept: HEALTH INFORMATION MANAGEMENT | Facility: CLINIC | Age: 52
End: 2022-07-25

## 2022-07-25 RX ORDER — MULTIVITAMIN,THERAPEUTIC
1 TABLET ORAL DAILY
COMMUNITY
End: 2023-06-05

## 2022-07-26 ENCOUNTER — HOME INFUSION (PRE-WILLOW HOME INFUSION) (OUTPATIENT)
Dept: PHARMACY | Facility: CLINIC | Age: 52
End: 2022-07-26

## 2022-07-26 ENCOUNTER — MEDICAL CORRESPONDENCE (OUTPATIENT)
Dept: HEALTH INFORMATION MANAGEMENT | Facility: CLINIC | Age: 52
End: 2022-07-26

## 2022-07-26 NOTE — PROGRESS NOTES
This is a recent snapshot of the patient's [facility] medical record.  For current drug dose and complete information and questions, call [phone] or In Basket pool, fv home infusion ([id number])  CSN Number:  [id number]

## 2022-07-26 NOTE — DISCHARGE SUMMARY
[facility]  Hospitalist Discharge Summary      Date of Admission:  7/7/2022  Date of Discharge:  7/13/2022 12:17 PM  Discharging Provider: Ab Medeiros MD  Discharge Service: Medicine Service, [facility]INA TEAM 2    Discharge Diagnoses   Sepsis  Acute Encephalopathy  Polymicrobial Bacteremia  Bilateral Upper Extremity DVTs  Severe malnutrition  GERD  Pancytopenia  Positive Hepatitis B Surface Antigen    Follow-ups Needed After Discharge   Follow-up Appointments     Adult [facility] Follow-up and recommended labs and tests      ID Follow up in 1 month    While on antibiotics weekly CBC,  CMP and CRP by home infusion. Results to   be sent to Dr. Vyas, fax [phone] Patient will also need vancomycin   trough level monitoring according to pharmacy protocol and pharmacy   support for the duration of antimicrobial treatment.     Appointments on [place] and/or [facility] (with [facility] or [facility]   provider or service). Call [phone] if you haven't heard regarding   these appointments within 7 days of discharge.             Discharge Disposition   Discharged to home  Condition at discharge: Stable    Hospital Course      51 year old male with history of CLBSI, cardiomyopathy, chronic abdominal pain, GERD, RUE DVT, dysphagia, hyperlipidemia, malnutrition and short gut syndrome and bariatric surgery with a chronic G tube vent as well as TPN dependence admitted for sepsis from acute line infection. Patient is now s/p Cm line removal by IR on 7/8 with new Cm placement after blood culture clearance on 7/13. Patient clinically and vitally improved prior to discharge with plans to complete inpatient IV antibiotics as outpatient.     Sepsis  Acute Encephalopathy  Polymicrobial Bacteremia  Acute encephalopathy in setting of sepsis secondary to polymicrobial bacteremia per inpatient blood cultures, likely seeded from prior to admission Cm catheter. Suspect that inadequate  line cares precipitated bacteremia. Prior to admission Cm catheter removed with IR on 7/8 with IV antibiotics started per inpatient ID. Further work-up with 7/8 TTE revealed no valve vegitations or abnormalities. Subsequent placement of new Cm catheter on 7/13 following blood culture clearance. Full culture and treatment course detailed below. Patient back to cognitive baseline prior to discharge.  - Continue IV Ertapenem and vancomycin on discharge  - Resume prior to admission Home Care services for infusions, including IV antibiotics  - Advised to increase outpatient line care frequency to 2-3 times each week  - Follow-up with Infectious Disease service one month following discharge    Antibiotics  - Ertapenem (7/12-7/22)  - Cefepime (7/8-7/12)  - Vancomycin (7/8-7/22)  - Meropenem (7/7-7/8), empiric      Cultures:  - 7/8 and 7/9  cultures Negative  - 7/7 cultures positive for enterobacter cloacae, bacillus cereus, lactococcus lactis  - 7/5 outside cultures positive for enterobacter cloacae, bacillus cereus, lactococcus    - resistant to Ampicillin, Unasyn and Penicillin    Bilateral Upper Extremity DVTs  Patient with bilateral upper extremity lymphedema noted during hospital stay with bilateral upper extremity ultrasound revealing non-occlusive DVTs of right internal jugular, right subclavian, right axillary and left medial subclavian veins. Of note, patient previous found to have right upper extremity DVT in February 2022 and treated with 120mg Lovenox daily with transition to 80mg as outpatient. Per discussion with inpatient hematology, patient started on Lovenox 80mg BID while inpatient with transition to 120mg daily as outpatient.   - Continue Lovenox 120mg daily on discharge  - No indication to repeat ultrasound unless symptomatic  - Can follow-up with hematology clinic as needed following discharge    Severe malnutrition  Patient receives TPN initially via G tube feed (failed, now only for  venting) now via Cm. Additional history of dysphagia. Oral intake for pleasure per wife. TPN stopped in setting of sepsis with patient transitioned to PPN following prior to admission Cm removal. Resumed TPN following new Cm catheter placement.  - Resume prior to admission Home Care services for infusions    GERD  - Continue PPI on discharge    Pancytopenia  Likely secondary to marrow suppression in setting of sepsis, resolved prior to discharge.    Positive Hepatitis B Surface Antigen  Positive Hepatitis B surface antigen with negative follow-up testing, suspect false positive result.    Consultations This Hospital Stay   PHARMACY TO DOSE VANCO  INFECTIOUS DISEASE GENERAL ADULT IP CONSULT  NUTRITION SERVICES ADULT IP CONSULT  GI LUMINAL ADULT IP CONSULT  INTERVENTIONAL RADIOLOGY ADULT/PEDS IP CONSULT  NURSING TO CONSULT FOR VASCULAR ACCESS CARE IP CONSULT  PHARMACY/NUTRITION TO START AND MANAGE TPN  PHARMACY IP CONSULT  PHARMACY IP CONSULT  NURSING TO CONSULT FOR VASCULAR ACCESS CARE IP CONSULT  SPEECH LANGUAGE PATH ADULT IP CONSULT  NURSING TO CONSULT FOR VASCULAR ACCESS CARE IP CONSULT  NURSING TO CONSULT FOR VASCULAR ACCESS CARE IP CONSULT  NURSING TO CONSULT FOR VASCULAR ACCESS CARE IP CONSULT  HEMATOLOGY ADULT IP CONSULT  INTERVENTIONAL RADIOLOGY ADULT/PEDS IP CONSULT  NURSING TO CONSULT FOR VASCULAR ACCESS CARE IP CONSULT  LYMPHEDEMA THERAPY IP CONSULT  PHARMACY IP CONSULT  NURSING TO CONSULT FOR VASCULAR ACCESS CARE IP CONSULT    Code Status   Prior    Time Spent on this Encounter   I, Ab Medeiros MD, personally saw the patient today and spent greater than 30 minutes discharging this patient.       Patient care discussed with [place] [address] staff, Dr. Julisa Gan.    Ab Medeiros MD  [facility] [address] [place] [id number]  Phone: [phone]  ______________________________________________________________________    Physical Exam   Vital Signs: 123/69 BP; 53  HR; 14 RR; 97% on room air  Weight: 185 lbs 6.51 oz    Constitutional: cooperative, no apparent distress  HENT: anicteric sclera, conjugate gaze   Respiratory: non-labored respirations on room air, CTAB, no crackles or wheezing, no cough  Cardiovascular: RRR, no murmur, trace edema   GI: soft, non-distended, non-tender  Skin: warm and dry, no rashes or lesions  Neurologic: Cranial nerves II-XII are grossly intact, moving all extremities equally and independently      Primary Care Physician   Chuy Isaac    Discharge Orders      Home Care Referral      Home infusion referral      Primary Care - Care Coordination Referral      Activity    Your activity upon discharge: Activity as tolerated     Resume Home Care Services     Adult [facility] Follow-up and recommended labs and tests    ID Follow up in 1 month    While on antibiotics weekly CBC,  CMP and CRP by home infusion. Results to be sent to Dr. Vyas, fax [phone] Patient will also need vancomycin trough level monitoring according to pharmacy protocol and pharmacy support for the duration of antimicrobial treatment.     Appointments on [place] and/or [facility] (with [facility] or [facility] provider or service). Call [phone] if you haven't heard regarding these appointments within 7 days of discharge.     Reason for your hospital stay    Dear Parker Acevedo    Your were hospitalized at [facility] with a blood stream infection that has since resolved with IV antibiotics. You are now ready for discharge home after your Cm catheter placement.     We are suggesting the following medication changes:  - Continue Vancomycin and Ertapenem through 7/22  - Continue Enoxaparin 80 twice a day for blood clots    Please get the following tests done:  - Vancomycin levels with [place] home infusion, results to be sent to Dr. Vyas, fax [phone]    Please follow-up with the following providers:  - Infectious Disease clinic    It was a  pleasure meeting with you today. Thank you for allowing me and my team the privilege of caring for you today. You are the reason we are here, and I truly hope we provided you with the excellent service you deserve. Please let us know if there is anything else we can do for you so that we can be sure you are leaving completely satisfied with your care experience.    Take care!  Ab Medeiros MD  [facility]     Diet    Resume TPN on discharge, Inpatient regimen as below:    -Cyclic TPN x 12 hours/evening (2000 to 0800, or per pt preference)  --Run @ rate of 60 mL/hr for 1 hour, 120 mL/hr for 10 hours, and decrease rate to 60 mL/hr x 1 hour for total of 12-hour TPN cycle.   --Total volume ~1320 mL or per phamD with dextrose of 175 g (max GIR= 2.5 mg/kg/min), 100 g AA (1.2 g/kg)   --250 mL 20% Intralipid per home regimen 3x/week       Significant Results and Procedures     Results for orders placed or performed during the hospital encounter of 07/07/22   XR Chest 2 Views    Narrative    Chest PA and lateral    HISTORY: Positive blood culture    COMPARISON STUDY: 5/7/2022    FINDINGS: Cardiac silhouette is nonenlarged. Right IJ central line tip  in the mid SVC. Small hiatal hernia. There is bibasilar atelectasis  and or consolidation. Lower cervical spinal fusion changes.      Impression    IMPRESSION: Bibasilar atelectasis and/or pneumonia.    MEGAN CANALES MD         SYSTEM ID:  [id number]   CT Head w/o & w Contrast    Narrative    CT HEAD W/O & W CONTRAST 7/7/2022 8:18 PM    Provided History: Encephalopathic in the background of sepsis. R/o  brain parenchymal process. Not able to tolerate MRI.    Comparison: Head CT 7/22/2019.    Technique: Using multidetector thin collimation helical acquisition  technique, axial, coronal and sagittal CT images from the skull base  to the vertex were obtained without and with intravenous contrast.     Contrast: 75 cc Isovue-370 intravenously    Findings:    No  intracranial hemorrhage. No mass effect. No midline shift. No  extra-axial fluid collection. The gray to white matter differentiation  of the cerebral hemispheres is preserved. Ventricles are proportionate  to the sulci. No sulcal effacement. The basal cisterns are patent.    Scattered mucosal thickening in the ethmoid air cells. The mastoid air  cells are clear. Orbits appear unremarkable. No acute fracture.    Postcontrast images demonstrate no definite suspicious intracranial  enhancement. The major intracranial vessels are patent and normal in  caliber. Arachnoid granulations in the left transverse sinus.      Impression    Impression:   No acute intracranial findings or definite suspicious enhancement.    I have personally reviewed the examination and initial interpretation  and I agree with the findings.    REYNOLD GIMENEZ MD         SYSTEM ID:  [id number]   US Abdomen Complete w Doppler Complete    Narrative    EXAMINATION: US ABDOMEN COMPLETE WITH DOPPLER COMPLETE 7/8/2022 8:38  AM     COMPARISON: Abdomen and pelvis CT 2/16/2019; ultrasound abdomen  9/20/2017    HISTORY: Patient with recent history of clot and elevated LFTs, rule  out liver obstruction.    TECHNIQUE: The abdomen was scanned in standard fashion with  specialized ultrasound transducer(s) using both gray-scale, color  Doppler, and spectral flow techniques.    Findings:  Liver: The liver is enlarged and measures 23.3 cm in greatest  craniocaudal dimension. Demonstrates increased periportal  echogenicity. No evidence of a focal hepatic mass.     Extrahepatic portal vein flow is antegrade at 17.8 cm/s.  Right portal vein flow is antegrade, measuring 14.6 cm/s.  Left portal vein flow is antegrade, measuring 8.7 cm/s.    Flow in the hepatic artery is towards the liver and:  75.8 cm/s peak systolic  0.68 resistive index.     The splenic vein is patent and flow is towards the liver.  The left,  middle, and right hepatic veins are patent with flow towards  the IVC.  The IVC is patent with flow towards the heart.   IVC appears prominent  The visualized aorta is not dilated.    Gallbladder: Cholecystectomy.    Bile Ducts: Both the intra- and extrahepatic biliary system are of  normal caliber.  The common bile duct measures 9 mm, previously  measured approximately 11 mm on the comparison abdomen pelvis CT.    Pancreas: Visualized portions of the head and body of the pancreas are  unremarkable.     Kidneys: Both kidneys are of normal echotexture, without mass or  hydronephrosis.   Renal lengths: right- 12.7 cm, left- 12.6 cm.    Spleen: The spleen measures 13 cm in length.    Fluid: No evidence of ascites or pleural effusions.      Impression    Impression:     1.  Hepatomegaly measuring up to 23.3 cm and borderline splenomegaly  measuring 13 cm.  2.  Nonspecific increased hepatic periportal echogenicity which may be  seen with congestive or inflammatory etiology.  3. Persistentprominent common bile duct measuring up to 9 mm likely  represent postcholecystectomy reservoir effect.  4. Patent hepatic vasculature by Doppler evaluation.  5. Prominent appearing IVC, may represent congestive changes.    I have personally reviewed the examination and initial interpretation  and I agree with the findings.    MANUEL CALDWELL MD         SYSTEM ID:  [id number]   IR CVC Tunnel Removal Right    Narrative    Procedures 7/8/2022:  1. Right internal jugular tunneled central venous catheter removal.     History: Right TCVC removal    Comparison: 7/7/2022    Staff: Yaya Thomas MD    Fellow/Resident: Deo Baldwin M.D.    I, Dr. Yaya Thomas, was present for the critical part of the  procedure and immediately available for the entire procedure.    Monitoring: Patient not sedated    Medications:  10  cc 1% lidocaine    Complications: None    Procedure: The patient understood the limitations, alternatives, and  risks of the procedure and requested the procedure be performed.  Both  written and oral consent were obtained.    Physical examination demonstrated no erythema, tenderness, fluctuance,  or discharge at the catheter exit site or along the tract. The  catheter entry site was prepped and draped in usual sterile fashion.  Following infiltration around the catheter with 1% lidocaine catheter  was attempted to be pulled however significant resistance encountered.  Blunt dissection was used to free the cuff from the subcutaneous  tissues. The catheter was then removed intact with maneuvering.  Compression was applied over the venotomy site as well as along the  tract until hemostasis was achieved. The skin at the exit site was  approximated with Steri-Strips. A sterile dressing was applied at the  site. The procedure was well tolerated, with no immediate  complications.    No medications for sedation were administered. No images obtained nor  saved.    Estimated blood loss: Less than 1 cc.    Specimen: None      Impression    Impression: right sided tunneled central venous catheter removed  intact and without difficulty. The catheter tip sent for culture.    I have personally reviewed the examination and initial interpretation  and I agree with the findings.    JUAN MIGUEL ZAVALETA         SYSTEM ID:  [id number]   US Upper Extremity Venous Duplex Bilat     Value    Radiologist flags DVT (Urgent)    Narrative    EXAMINATION: DOPPLER VENOUS ULTRASOUND OF BILATERAL UPPER EXTREMITIES,  7/10/2022 9:15 AM     COMPARISON: Left upper terminate venous ultrasound 5/9/2022    HISTORY: Increased arm swelling, recent DVT    TECHNIQUE:  Gray-scale evaluation with compression, spectral flow, and  color Doppler assessment of the deep venous system of both upper  extremities.    FINDINGS:  Nonocclusive thrombus in the right internal jugular vein with patent  right to left collateral. Nonocclusive thrombus in the right  subclavian and right axillary veins. The right brachial, cephalic,  basilic veins are  fully compressible. Scattered right upper extremity  edema.    The left internal jugular vein is fully compressible with normal color  Doppler flow. Nonocclusive thrombus in the left medial subclavian  vein. The left innominate, mid and lateral subclavian, and axillary  veins are patent. The left brachial, basilic, and cephalic veins in  the forearm are fully compressible. The cephalic vein in the upper arm  is not seen.      Impression    IMPRESSION:  1. Nonocclusive deep venous thrombosis in the right internal jugular,  right subclavian, and right axillary veins.  2. Nonocclusive deep venous thrombosis in the left medial subclavian  vein.      [Urgent Result: DVT]    Finding was identified on 7/10/2022 10:11 AM.     Dr. Medeiros was contacted by Dr. Garcia at 7/10/2022 10:41 AM and  verbalized understanding of the urgent finding.      I have personally reviewed the examination and initial interpretation  and I agree with the findings.    MEGAN CANALES MD         SYSTEM ID:  [id number]   IR CVC Tunnel Placement > 5 Yrs of Age    Narrative    CLINICAL HISTORY: Patient requires central venous access for therapy.  Tunneled central venous catheter placement requested.    ATTENDING : Shanae Fong MD.    FELLOW: Mayco ACOSTA    CONSENT: Written informed consent was obtained and is documented in  the patient record.    MEDICATIONS:   1. 4 mg midazolam IV  2. 200 mcg fentanyl IV  3. 20 mL 1% lidocaine for local anesthesia  4. 100 units heparin per lumen    NURSING: Patient continuously monitored by trained, independent  observer.    SEDATION TIME: 30 minutes face-to-face    FLUOROSCOPY TIME: 0.4 minutes    Dose: 6.0 mGy    DESCRIPTION: The left neck and upper chest were prepped and draped in  the usual sterile fashion.      Under ultrasound guidance, the left internal jugular vein was  identified and the overlying skin was anesthetized and skin  dermatotomy was made. Under ultrasound guidance, left internal  jugular  venipuncture was made with needle. Image saved documenting  venipuncture and patency.    Needle was exchanged over guidewire for a dilator under fluoroscopic  guidance was subsequently placed a peel-away sheath over the wire into  the right atrium.  Length to right atrium was measured with guidewire.  Guidewire was removed. In the peel-away sheath was covered.     The anterior chest skin was anesthetized and incision was made after  measurements were taken. A cuffed catheter was subcutaneously tunneled  from the anterior chest incision to the internal jugular venipuncture  site after path of tunnel was anesthetized. The dilator was exchanged  over guidewire for a peel-away sheath. Guidewire was removed. Under  fluoroscopic guidance, the catheter was placed through the peel-away  sheath. Peel-away sheath was removed.      Final catheter position saved. Both catheter lumens adequately  aspirated and flushed. Each lumen was heparin locked. A catheter  retaining suture and sterile dressing were applied. The skin  dermatotomy site overlying the internal jugular venipuncture was  closed with topical adhesive.    COMPLICATIONS: No immediate concerns, the patient remained stable  throughout the procedure and tolerated it well.    ESTIMATED BLOOD LOSS: Minimal      Impression    IMPRESSION: Completed image-guided placement of 6 [place], 26 cm double  lumen tunneled central venous catheter via left internal jugular vein.  Catheter tip in high right atrium. Aspirates and flushes freely,  heparin locked and ready for immediate use. No complication.    Echo Complete     Value    LVEF  45-50% (mildly reduced)    [facility]    [id number]  [id number]  [id number]  [id number]^SIRI^YOLA     [facility]  Echocardiography Laboratory  [address] [place] [id number]     Name: SARA HASSAN  MRN: [id number]  : 1970  Study Date: 2022 09:25 AM  Age: 51 yrs  Gender: Male  Patient  Location: [facility]  Reason For Study: Endocarditis  Ordering Physician: YOLA HORNER  Performed By: Yolanda Velez     BSA: 2.0 m2  Height: 71 in  Weight: 175 lb  HR: 57  BP: 109/65 mmHg  ______________________________________________________________________________  Procedure  Complete Portable Echo Adult. Echocardiogram with two-dimensional, color and  spectral Doppler performed.  ______________________________________________________________________________  Interpretation Summary  Left ventricular function is decreased. The ejection fraction is 45-50%  (mildly reduced).  Global right ventricular function is borderline reduced.  No significant valvular abnormalities present. No obvious valvular vegetations  seen.  Dilation of the inferior vena cava is present with abnormal respiratory  variation in diameter.  No pericardial effusion is present.  ______________________________________________________________________________  Left Ventricle  Left ventricular size is normal. Left ventricular wall thickness is normal.  Left ventricular function is decreased. The ejection fraction is 45-50%  (mildly reduced). No regional wall motion abnormalities are seen.     Right Ventricle  The right ventricle is normal size. Global right ventricular function is  borderline reduced.     Atria  Both atria appear normal.     Mitral Valve  The mitral valve is normal.     Aortic Valve  Aortic valve is normal in structure and function.     Tricuspid Valve  Trace to mild tricuspid insufficiency is present. The right ventricular  systolic pressure is approximated at 17.3 mmHg plus the right atrial pressure.  Pulmonary artery systolic pressure is normal.     Pulmonic Valve  The pulmonic valve is normal.     Vessels  The aorta root is normal. Dilation of the inferior vena cava is present with  abnormal respiratory variation in diameter.     Pericardium  No pericardial effusion is present.     Compared to Previous Study  This study was  compared with the study from 5.10.22 . Biventricular function  has declined.  ______________________________________________________________________________  MMode/2D Measurements & Calculations     IVSd: 0.84 cm  LVIDd: 5.3 cm  LVIDs: 3.9 cm  LVPWd: 0.85 cm  FS: 27.4 %  LV mass(C)d: 161.9 grams  LV mass(C)dI: 81.3 grams/m2  RWT: 0.32  TAPSE: 1.7 cm     Doppler Measurements & Calculations  MV E max greta: 73.3 cm/sec  MV A max greta: 52.3 cm/sec  MV E/A: 1.4  MV dec slope: 578.0 cm/sec2  MV dec time: 0.13 sec  TR max greta: 208.0 cm/sec  TR max P.3 mmHg  E/E' av.7  Lateral E/e': 5.9  Medial E/e': 7.5     ______________________________________________________________________________  Report approved by: Sudhir Valente 2022 10:27 AM           *Note: Due to a large number of results and/or encounters for the requested time period, some results have not been displayed. A complete set of results can be found in Results Review.       Discharge Medications   Discharge Medication List as of 2022 11:48 AM      START taking these medications    Details   diphenhydrAMINE (BENADRYL) 50 MG capsule Take 1 capsule (50 mg) by mouth every 12 hours, Disp-14 capsule, R-0, E-Prescribe      ertapenem (INVANZ) 1 GM vial Inject 1 g into the vein every 24 hours for 9 days, Disp-90 mL, R-0, E-Prescribe      vancomycin 1500 mg twice daily with [facility], Disp-1500 mg, R-0, No [facility]         CONTINUE these medications which have CHANGED    Details   enoxaparin ANTICOAGULANT (LOVENOX) 80 MG/0.8ML syringe Inject 0.8 mLs (80 mg) Subcutaneous 2 times daily, Disp-48 mL, R-0, E-PrescribePatient to receive 0.8mL twice daily, 30 day supply on discharge      carvedilol (COREG) 12.5 MG tablet Take 1 tablet (12.5 mg) by mouth 2 times daily (with meals), Disp-60 tablet, R-0, E-Prescribe         CONTINUE these medications which have NOT CHANGED    Details   acetaminophen (TYLENOL) 32 mg/mL  "liquid Take 15.65 mLs (500 mg) by mouth every 4 hours as needed for fever or mild pain, Disp-455 mL, R-1, E-Prescribe      albuterol (PROAIR HFA/PROVENTIL HFA/VENTOLIN HFA) 108 (90 Base) MCG/ACT inhaler Inhale 2 puffs into the lungs every 6 hours as needed, Disp-18 g, R-1, E-PrescribePharmacy may dispense brand covered by insurance (Proair, or proventil or ventolin or generic albuterol inhaler)      cyanocobalamin (CYANOCOBALAMIN) 1000 MCG/ML injection INJECT 1 ML INTO THE MUSCLE EVERY 30 DAYS, Disp-1 mL, R-3, E-Prescribe      EPINEPHrine (ANY BX GENERIC EQUIV) 0.3 MG/0.3ML injection 2-pack Historical      !! [facility] INFUSION MANAGED PATIENT Contact [facility] for patient specific medication information at [phone] on admission and discharge from the hospital.  Phones are answered 24 hours a day 7 days a week 365 days a year.    Providers - Choose \"CONTINUE HOME MED (no scr ipt)\" at discharge if patient treatment with home infusion will continue., No Print OutResume prior to admission TPN/Lipids/saline      insulin syringe-needle U-100 (29G X 1/2\" 1 ML) 29G X 1/2\" 1 ML miscellaneous Use to inject b12 every 30 days, Disp-3 each, R-4, E-Prescribe      lactated ringers infusion Inject 1,000-2,000 mLs into the vein daily as needed, No Print Out      lidocaine (LIDODERM) 5 % patch Place 1-2 patches onto the skin every 24 hours Wear for 12 hours, remove for 12 hours.  OK to cut to better fit to size.Disp-60 patch, [id number]      naloxone (NARCAN) 4 MG/0.1ML nasal spray Spray 1 spray (4 mg) into one nostril alternating nostrils once as needed for opioid reversal every 2-3 minutes until assistance arrives, Disp-0.2 mL, R-0, E-Prescribe      ondansetron (ZOFRAN-ODT) 8 MG ODT tab DISSOLVE ONE TABLET ON TONGUE EVERY 8 HOURS AS NEEDED FOR NAUSEA, Disp-90 tablet, R-1, E-Prescribe      !! parenteral nutrition - PTA/DISCHARGE ORDER Patient receives 2050 mL TPN every 14 hours through PICC at [facility] Home Infusion., Historical      polyethylene glycol (MIRALAX) 17 GM/Dose powder Take 17 g by mouth daily, Disp-1020 g, R-3, E-Prescribe(2 bottles)      sucralfate (CARAFATE) 1 GM/10ML suspension TAKE 10MLS  BY MOUTH FOUR TIMES A DAY AS NEEDED, Disp-420 mL, R-1, E-Prescribe      vitamin D2 (ERGOCALCIFEROL) 74966 units (1250 mcg) capsule TAKE 1 CAPSULE (50,000 UNITS) BY MOUTH ONCE A WEEK, Disp-12 capsule, R-3, E-Prescribe      amphetamine-dextroamphetamine (ADDERALL) 20 MG tablet TAKE ONE TABLET BY MOUTH ONCE DAILY, Disp-30 tablet, R-0, E-Prescribe      LORazepam (ATIVAN) 1 MG tablet TAKE 1 TABLET (1MG) BY MOUTH ONCE A DAY AS NEEDED FOR ANXIETY WITH TPN AND MEDICATIONS . 30 TO LAST 30 DAYS, Disp-30 tablet, R-0, E-Prescribe      nystatin (MYCOSTATIN) 513937 UNIT/GM external cream APPLY TOPICALLY 2 TIMES DAILYDisp-30 g, [id number]      oxyCODONE (ROXICODONE) 5 MG/5ML solution Take 5-10 mLs (5-10 mg) by mouth 4 times daily as needed for pain Max of 40mg/day. Put at least 4 hours between doses. Fill 06/29/22 and start 07/01/22. 30 day supply for chronic pan, Disp-1200 mL, R-0, E-Prescribe      pantoprazole (PROTONIX) 40 MG EC tablet Take 1 tablet (40 mg) by mouth daily, Disp-30 tablet, R-5, E-Prescribe       !! - Potential duplicate medications found. Please discuss with provider.      STOP taking these medications       bisacodyl (DULCOLAX) 5 MG EC tablet Comments:   Reason for Stopping:         enoxaparin ANTICOAGULANT (LOVENOX) 120 MG/0.8ML syringe Comments:   Reason for Stopping:         fentaNYL (DURAGESIC) 25 mcg/hr 72 hr patch Comments:   Reason for Stopping:         magnesium citrate solution Comments:   Reason for Stopping:         polyethylene glycol (GOLYTELY) 236 g suspension Comments:   Reason for Stopping:             Allergies   Allergies   Allergen Reactions     Bactrim [Sulfamethoxazole W/Trimethoprim] Rash     Penicillins Anaphylaxis     Please see Antimicrobial Management Team allergy assessment  note 10/10/2018. Patient reported tolerating amoxicillin.     Ertapenem Nausea and Vomiting     Doxycycline Rash     Vancomycin Rash     Rash after receiving vancomycin 3/28/16 (infusion reaction?). Tolerated with slower infusion and diphenhydramine premed.

## 2022-07-27 ENCOUNTER — HOME INFUSION (PRE-WILLOW HOME INFUSION) (OUTPATIENT)
Dept: PHARMACY | Facility: CLINIC | Age: 52
End: 2022-07-27

## 2022-07-27 NOTE — TELEPHONE ENCOUNTER
This was completed in a separate encounter.     Natalie MENARD RN CNP, FNP  [facility]

## 2022-07-27 NOTE — PROGRESS NOTES
This is a recent snapshot of the patient's [facility] medical record.  For current drug dose and complete information and questions, call [phone] or In Basket pool, fv home infusion ([id number])  CSN Number:  [id number]

## 2022-07-27 NOTE — PROGRESS NOTES
This is a recent snapshot of the patient's [facility] medical record.  For current drug dose and complete information and questions, call [phone] or In Basket pool, fv home infusion ([id number])  CSN Number:  [id number]

## 2022-07-28 ENCOUNTER — HOME INFUSION (PRE-WILLOW HOME INFUSION) (OUTPATIENT)
Dept: PHARMACY | Facility: CLINIC | Age: 52
End: 2022-07-28

## 2022-07-29 NOTE — PROGRESS NOTES
This is a recent snapshot of the patient's [facility] medical record.  For current drug dose and complete information and questions, call [phone] or In Basket pool, fv home infusion ([id number])  CSN Number:  [id number]

## 2022-08-02 ENCOUNTER — VIRTUAL VISIT (OUTPATIENT)
Dept: INTERNAL MEDICINE | Facility: CLINIC | Age: 52
End: 2022-08-02
Payer: COMMERCIAL

## 2022-08-02 DIAGNOSIS — Z98.84 GASTRIC BYPASS STATUS FOR OBESITY: ICD-10-CM

## 2022-08-02 DIAGNOSIS — T82.514D HICKMAN CATHETER DYSFUNCTION, SUBSEQUENT ENCOUNTER: ICD-10-CM

## 2022-08-02 DIAGNOSIS — R78.81 GRAM-POSITIVE BACTEREMIA: ICD-10-CM

## 2022-08-02 DIAGNOSIS — I82.90 VTE (VENOUS THROMBOEMBOLISM): Primary | ICD-10-CM

## 2022-08-02 DIAGNOSIS — Z98.84 S/P BARIATRIC SURGERY: ICD-10-CM

## 2022-08-02 PROBLEM — B18.1 HEPATITIS B, CHRONIC (H): Status: ACTIVE | Noted: 2022-08-02

## 2022-08-02 PROCEDURE — 99214 OFFICE O/P EST MOD 30 MIN: CPT | Performed by: INTERNAL MEDICINE

## 2022-08-02 RX ORDER — SUCRALFATE ORAL 1 G/10ML
SUSPENSION ORAL
Qty: 420 ML | Refills: 1 | Status: SHIPPED | OUTPATIENT
Start: 2022-08-02 | End: 2022-11-18

## 2022-08-02 RX ORDER — ENOXAPARIN SODIUM 100 MG/ML
80 INJECTION SUBCUTANEOUS 2 TIMES DAILY
Qty: 48 ML | Refills: 0 | Status: SHIPPED | OUTPATIENT
Start: 2022-08-02 | End: 2022-08-24

## 2022-08-02 NOTE — PROGRESS NOTES
Parker is a 51 year old who is being evaluated via a billable video visit.      How would you like to obtain your AVS? MyChart  If the video visit is dropped, the invitation should be resent by: Text to cell phone: [phone]  Will anyone else be joining your video visit? No        Assessment & Plan   Problem List Items Addressed This Visit     Gastric bypass status for obesity    Relevant Medications    sucralfate (CARAFATE) 1 GM/10ML suspension    S/P bariatric surgery    Gram-positive bacteremia      Other Visit Diagnoses     VTE (venous thromboembolism)    -  Primary    Relevant Medications    enoxaparin ANTICOAGULANT (LOVENOX) 80 MG/0.8ML syringe    Cm catheter dysfunction, subsequent encounter               Patient who has had a bariatric surgery that went bad he cannot eat he has a G-tube that drains out his gastric juices but he is totally TPN dependent.  Unfortunately he gets frequent bacteremic infections with his lines.  He was treated with antibiotics in the hospital and had a new Cm placed on the left arm.  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  He was discharged on ertapenem and vancomycin which worked well he has completed those and is feeling good.    For the DVTs bilaterally he was started on Lovenox injections with the plan to continue that for 6 months I will refill his Lovenox 80 mg twice a day.    He goes back for his Cm which is not functioning well he sees them and September.    Patient has leakage from his G-tube every night therefore he uses underpads Chux pads on his bed at night 2 pads per night to catch drainage.  Please continue to provide him with 14 Tucks pads for the nights each week and 2 extra for dressing changes for a total of 16/week or 64 for the 4 weeks, 70 for the month;         Nicotine/Tobacco Cessation:  He reports that he has been smoking cigarettes. He has a 0.30 pack-year smoking history. He has never used smokeless  "tobacco.  Nicotine/Tobacco Cessation Plan:         BMI:   Estimated body mass index is 23.71 kg/m  as calculated from the following:    Height as of 5/7/22: 1.803 m (5' 11\").    Weight as of 7/16/22: 77.1 kg (170 lb).           No follow-ups on file.    Chuy Isaac MD  [facility] MASHA Canales is a 51 year old, presenting for the following health issues:  Follow Up (Sepsis )    History of Present Illness       Reason for visit:  Follow up from hospital stay    He eats 0-1 servings of fruits and vegetables daily.He consumes 0 sweetened beverage(s) daily.He exercises with enough effort to increase his heart rate 9 or less minutes per day.  He exercises with enough effort to increase his heart rate 3 or less days per week.   He is taking medications regularly.     Hospital Follow-up Visit:    Hospital/Nursing Home/IP Rehab Facility: [facility]  Date of Admission: 7/7/22  Date of Discharge: 7/13/22  Reason(s) for Admission: Sepsis, blood clots    Was your hospitalization related to COVID-19? No   Problems taking medications regularly:  None  Medication changes since discharge: None, completed antibiotic   Problems adhering to non-medication therapy:  None    Summary of hospitalization:  [facility] hospital discharge summary reviewed  Diagnostic Tests/Treatments reviewed.  Follow up needed: none  Other Healthcare Providers Involved in Patient s Care:         Homecare  Update since discharge: improved. Post Medication Reconciliation Status:        Plan of care communicated with patient     Feeling better then two weeks ago when infected.  Antibiotics are done, ertapenem made his sick, changed and did better over 24 hour run so always hooked up.      Cm line in place but won't draw blood, Has a follow up on this,  Had some numbness in the left arm.  Complicated by clots seen in hospital. Has lifelong TPN.      Lovenox 80mg shots " "twice a day for clots.  6 months of shots. Had clots in his internal jugular, needs a refill of the lovenox.      Chux. Underpad 2 pads per bed every night for drainage from gastric tube.  2 per week for sterile change with nursing dressing change. Will need these forever with known G tube and drainage.       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     Complication of anesthesia      Difficulty swallowing      Gastric ulcer, unspecified as acute or chronic, without mention of hemorrhage, perforation, or obstruction      Gastro-oesophageal reflux disease      Head injury      Hiatal hernia      Other bladder disorder      Other chronic pain      PONV (postoperative nausea and vomiting)      Severe malnutrition (H)     TPN     Short gut syndrome      Tobacco abuse      Current Outpatient Medications   Medication     acetaminophen (TYLENOL) 32 mg/mL liquid     albuterol (PROAIR HFA/PROVENTIL HFA/VENTOLIN HFA) 108 (90 Base) MCG/ACT inhaler     amphetamine-dextroamphetamine (ADDERALL) 20 MG tablet     carvedilol (COREG) 6.25 MG tablet     cyanocobalamin (CYANOCOBALAMIN) 1000 MCG/ML injection     enoxaparin ANTICOAGULANT (LOVENOX) 80 MG/0.8ML syringe     EPINEPHrine (ANY BX GENERIC EQUIV) 0.3 MG/0.3ML injection 2-pack     [facility] INFUSION MANAGED PATIENT     fentaNYL (DURAGESIC) 25 mcg/hr 72 hr patch     insulin syringe-needle U-100 (29G X 1/2\" 1 ML) 29G X 1/2\" 1 ML miscellaneous     lactated ringers infusion     lidocaine (LIDODERM) 5 % patch     LORazepam (ATIVAN) 1 MG tablet     nystatin (MYCOSTATIN) 071644 UNIT/GM external cream     ondansetron (ZOFRAN-ODT) 8 MG ODT tab     oxyCODONE (ROXICODONE) 5 MG/5ML solution     pantoprazole (PROTONIX) 40 " MG EC tablet     polyethylene glycol (MIRALAX) 17 GM/Dose powder     sucralfate (CARAFATE) 1 GM/10ML suspension     diphenhydrAMINE (BENADRYL) 50 MG capsule     multivitamin, therapeutic (THERA-VIT) TABS tablet     naloxone (NARCAN) 4 MG/0.1ML nasal spray     parenteral nutrition - PTA/DISCHARGE ORDER     vitamin D2 (ERGOCALCIFEROL) 61035 units (1250 mcg) capsule     No current facility-administered medications for this visit.     Social History     Tobacco Use     Smoking status: Light Tobacco Smoker     Packs/day: 0.10     Years: 3.00     Pack years: 0.30     Types: Cigarettes     Smokeless tobacco: Never Used     Tobacco comment: 2/4/2021    smokes 3 cigarettes/day   Vaping Use     Vaping Use: Never used   Substance Use Topics     Alcohol use: No     Comment: quit 2002     Drug use: No       Review of Systems         Objective         Vitals:  No vitals were obtained today due to virtual visit.    Physical Exam   GENERAL: Healthy, alert and no distress  EYES: Eyes grossly normal to inspection.  No discharge or erythema, or obvious scleral/conjunctival abnormalities.  RESP: No audible wheeze, cough, or visible cyanosis.  No visible retractions or increased work of breathing.    PSYCH: Mentation appears normal, affect normal/bright, judgement and insight intact, normal speech and appearance well-groomed.          Video-Visit Details    Video Start Time: 3:48    Type of service:  Video Visit    Video End Time:4:02 PM    Originating Location (pt. Location): Home    Distant Location (provider location):  [facility]     Platform used for Video Visit: Unable to complete video visit    .  ..

## 2022-08-02 NOTE — PROGRESS NOTES
This is a recent snapshot of the patient's [facility] medical record.  For current drug dose and complete information and questions, call [phone] or In Basket pool, fv home infusion ([id number])  CSN Number:  [id number]

## 2022-08-04 ENCOUNTER — HOME INFUSION (PRE-WILLOW HOME INFUSION) (OUTPATIENT)
Dept: PHARMACY | Facility: CLINIC | Age: 52
End: 2022-08-04

## 2022-08-04 ENCOUNTER — TELEPHONE (OUTPATIENT)
Dept: INTERVENTIONAL RADIOLOGY/VASCULAR | Facility: CLINIC | Age: 52
End: 2022-08-04

## 2022-08-05 NOTE — PROGRESS NOTES
This is a recent snapshot of the patient's [facility] medical record.  For current drug dose and complete information and questions, call [phone] or In Basket pool, fv home infusion ([id number])  CSN Number:  [id number]

## 2022-08-08 NOTE — PROGRESS NOTES
[facility]           Assessment and Recommendations:   Problem List:    1. Hepatitis B surface antigen positive, but negative HBV viral load (PCR), Hepatitis B e antigen and antibody and hepatitis B core antibody  2. CLABSI, polymicrobial with Enterobacter, Lactococcus, Bacillus in July 2022 - Cm removed on 7/8 - blood cultures cleared on 7/8 - completed 2 weeks of IV antibiotic treatment on 7/22/22  4. H/o recurrent and frequent CLABSI  5. H/o R subcalvian vein thrombus  6. Allergies listed to penicillins, TMP-SMX, doxycycline, vancomycin (tolerates slow infusion and premedication)        Discussion:    Mr. Parker Acevedo51 yo M with a h/o prior bariatric surgery on chronic TPN c/b short-gut syndrome and frequent hospitalizations for recurrent CLABSI who ws hospitalized from 7/7/22-7/13/22, presenting with  with fevers and concern for a new line (R Cm) infection. Blood cultures were repeated on arrival to ED. Patient arrived incoherent and disoriented though afebrile and hemodynamically stable. No leukocytosis, CRP elevated. He was started on empiric antibiotics, including vancomycin and meropenem. Blood cultures from 7/5 and 7/7 polymicrobial; with  Enterobacter (sensitive to cefepime), Lactococcus lactis (sensitive to penicillin and vancomycin) and Bacillus cereus sensitive to vancomycin. Source of bacteremia likely new CLABSI so the Cm catheter was removed on 7/8/22. Blood cultures became negative on 7/8. He is afebrile since 7/9. Meropenem was narrowed to cefepime on 7/8/22 (covering the Enterobacter) and he continued to be on vancomycin (covering the Bacillus cereus and Lactococcus). TTE was negative for vegetations. Inpatient ID team signed off on 7/11/22 recommending 14 days of treatment with cefepime and vancomycin, however [facility]/Home infusion team contacted us on 7/11/22 and they asked if there is an alternative to cefepime with lower number of infusions to facilitate the  "administration when patient is dismissed. Cefepime was then replaced with ertapenem. Vancomycin will need to be continued and the plan was to complete 14 days of IV antibiotics (end date 7/22/22). On 7/16/22, I  received a call from home infusion team informing me that the patient was not tolerating ertapenem and he stated he does not want that antibiotic anymore. I then recommended to stop ertapanem and start cefepime (continueing vancomycin) with the same end date on 7/22/22. Patient completed treatment on that date.     On additional review of patient's labs, I noted that he had a hepatitis B surface antigen positive from 7/8/22. The HCV serology from the same date was negative.  The hepatitis B surface antibody was negative. For completeness, I placed an add on order for HBV viral load (PCR) as well as Hepatitis B e antigen and antibody and hepatitis B core antibody - they all came back negative. For completeness I also added HIV serology which was also negative. The initial hepatitis B test was obtained because his LFTs were abnormal on his admission. They are now normalized. Also an abdominal US was performed on 7/8/22 and showed: \"Hepatomegaly measuring up to 23.3 cm and borderline splenomegaly measuring 13 cm. 2.  Nonspecific increased hepatic periportal echogenicity which may be seen with congestive or inflammatory etiology. 3. Persistent prominent common bile duct measuring up to 9 mm likely represent postcholecystectomy reservoir effect. 4. Patent hepatic vasculature by Doppler evaluation. 5. Prominent appearing IVC, may represent congestive changes\". Patient does not have a previous Us abdomen and the image above was compared with the Ct abdomen/pelvis from Feb 2019.    On today's visit, the patient feels overall well and denies fever, chills or night sweats. He denies new symptoms. The visit was converted to telephone visit by our facilitator likely due to technical difficulties. "         Recommendations:     1. Patient already completed IV antibiotic treatment for bacteremia on 7/22/22     2. In regards of isolated positive hepatitis B surface antigen with negative HBV viral load (PCR), negative Hepatitis B e antigen and antibody and negative hepatitis B core antibody, I am suspicious that is is a false positive test. I will plan to repeat the hepatitis B surface antigen. I will determine the next steps depending on the result of the repeat hepatitis B surface antigen.                 3. I will plan to repeat the abdominal US since I am not entirely sure if the hepatomegaly seen on US from 7/8 was a transient process in the setting of bacteremia or if this is a separate process. If abnormality persists I will refer hi to hepatology/GI team.      4. I will determine the need for follow up depending on results from the repeat hepatitis B surface antigen and US abdomen       Recommendations discussed with the patient    Sandra Horne MD  Date of Service: 08/11/22    I spent a total of 45 min with chart review, patient visit, documentation and coordination of care.          History of Present Illness:   Mr. Parker [id number] yo M with a h/o prior bariatric surgery on chronic TPN c/b short-gut syndrome and frequent hospitalizations for recurrent CLABSI who ws hospitalized from 7/7/22-7/13/22, presenting with  with fevers and concern for a new line (R Cm) infection. Blood cultures were repeated on arrival to ED. Patient arrived incoherent and disoriented though afebrile and hemodynamically stable. No leukocytosis, CRP elevated. He was started on empiric antibiotics, including vancomycin and meropenem. Blood cultures from 7/5 and 7/7 polymicrobial; with  Enterobacter (sensitive to cefepime), Lactococcus lactis (sensitive to penicillin and vancomycin) and Bacillus cereus sensitive to vancomycin. Source of bacteremia likely new CLABSI so the Cm catheter was removed on 7/8/22. Blood  "cultures became negative on 7/8. He is afebrile since 7/9. Meropenem was narrowed to cefepime on 7/8/22 (covering the Enterobacter) and he continued to be on vancomycin (covering the Bacillus cereus and Lactococcus). TTE was negative for vegetations. Inpatient ID team signed off on 7/11/22 recommending 14 days of treatment with cefepime and vancomycin, however [facility]/Home infusion team contacted us on 7/11/22 and they asked if there is an alternative to cefepime with lower number of infusions to facilitate the administration when patient is dismissed. Cefepime was then replaced with ertapenem. Vancomycin will need to be continued and the plan was to complete 14 days of IV antibiotics (end date 7/22/22). On 7/16/22, I  received a call from home infusion team informing me that the patient was not tolerating ertapenem and he stated he does not want that antibiotic anymore. I then recommended to stop ertapanem and start cefepime (continueing vancomycin) with the same end date on 7/22/22. Patient completed treatment on that date.     On additional review of patient's labs, I noted that he had a hepatitis B surface antigen positive from 7/8/22. The HCV serology from the same date was negative.  The hepatitis B surface antibody was negative. For completeness, I placed an add on order for HBV viral load (PCR) as well as Hepatitis B e antigen and antibody and hepatitis B core antibody - they all came back negative. For completeness I also added HIV serology which was also negative. The initial hepatitis B test was obtained because his LFTs were abnormal on his admission. They are now normalized. Also an abdominal US was performed on 7/8/22 and showed: \"Hepatomegaly measuring up to 23.3 cm and borderline splenomegaly measuring 13 cm. 2.  Nonspecific increased hepatic periportal echogenicity which may be seen with congestive or inflammatory etiology. 3. Persistent prominent common bile duct measuring up to 9 mm likely represent " "postcholecystectomy reservoir effect. 4. Patent hepatic vasculature by Doppler evaluation. 5. Prominent appearing IVC, may represent congestive changes\". Patient does not have a previous Us abdomen and the image above was compared with the Ct abdomen/pelvis from Feb 2019.    Today's visit:    Patient feels overall well and denies fever, chills or night sweats. He denies new symptoms. The visit was converted to telephone visit by our facilitator likely due to technical difficulties.               Review of Systems:     CONSTITUTIONAL:  No fevers or chills  INTEGUMENTARY/SKIN: NEGATIVE for worrisome rashes, moles or lesions  EYES: Negative for icterus, vision changes or irritation  ENT/MOUTH:  Negative for oral lesions and sore throat  RESPIRATORY:  Negative for cough and dyspnea  CARDIOVASCULAR:  Negative for chest pain, palpitations and  shortness of breath  GASTROINTESTINAL:  Negative for abdominal pain, nausea, vomiting, diarrhea and constipation  GENITOURINARY:  Negative for dysuria, hematuria, frequency and urgency  MUSCULOSKELETAL: Negative for joint pain, swelling, motion restriction, negative for musculoskeletal pain  NEURO:  Negative for headache, altered mental status, numbness or weakness  PSYCHIATRIC: Negative for changes in mood or affect  HEMATOLOGIC/LYMPHATIC: negative for lymphadenopathy or bleeding  ALLERGIC/IMMUNOLOGIC: Negative for allergic reaction   ENDOCRINE: Negative for temperature intolerance, skin/hair changes         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     Complication of anesthesia      Difficulty swallowing      Gastric ulcer, unspecified as acute or chronic, without mention of hemorrhage, " perforation, or obstruction      Gastro-oesophageal reflux disease      Head injury      Hiatal hernia      Other bladder disorder      Other chronic pain      PONV (postoperative nausea and vomiting)      Severe malnutrition (H)     TPN     Short gut syndrome      Tobacco abuse             Allergies:         Allergies   Allergen Reactions     Bactrim [Sulfamethoxazole W/Trimethoprim] Rash     Penicillins Anaphylaxis     Please see Antimicrobial Management Team allergy assessment note 10/10/2018. Patient reported tolerating amoxicillin.     Ertapenem Nausea and Vomiting     Doxycycline Rash     Vancomycin Rash     Rash after receiving vancomycin 3/28/16 (infusion reaction?). Tolerated with slower infusion and diphenhydramine premed.             Family History:     Family History   Problem Relation Age of Onset     Gastrointestinal Disease Mother         Crohns disease     Anxiety Disorder Mother      Thyroid Disease Mother         Grave's disease     Cancer Father         ear cancer-skin cancer/melanoma     Breast Cancer Maternal Grandmother      Macular Degeneration Maternal Grandfather      Anxiety Disorder Sister      Diabetes Maternal Uncle      Breast Cancer Other      Hypertension No family hx of      Hyperlipidemia No family hx of      Cerebrovascular Disease No family hx of      Prostate Cancer No family hx of      Depression No family hx of      Anesthesia Reaction No family hx of      Asthma No family hx of      Osteoporosis No family hx of      Genetic Disorder No family hx of      Obesity No family hx of      Mental Illness No family hx of      Substance Abuse No family hx of      Glaucoma No family hx of              Social History:     Social History     Socioeconomic History     Marital status:      Spouse name: Rose     Number of children: 1     Years of education: Not on file     Highest education level: GED or equivalent   Occupational History     Not on file   Tobacco Use     Smoking  status: Light Tobacco Smoker     Packs/day: 0.10     Years: 3.00     Pack years: 0.30     Types: Cigarettes     Smokeless tobacco: Never Used     Tobacco comment: 2/4/2021    smokes 3 cigarettes/day   Vaping Use     Vaping Use: Never used   Substance and Sexual Activity     Alcohol use: No     Comment: quit 2002     Drug use: No     Sexual activity: Yes     Partners: Female     Birth control/protection: None     Comment: no protection   Other Topics Concern     Parent/sibling w/ CABG, MI or angioplasty before 65F 55M? No   Social History Narrative     Not on file     Social Determinants of Health     Financial Resource Strain: Medium Risk     Difficulty of Paying Living Expenses: Somewhat hard   Food Insecurity: No Food Insecurity     Worried About Running Out of Food in the Last Year: Never true     Ran Out of Food in the Last Year: Never true   Transportation Needs: No Transportation Needs     Lack of Transportation (Medical): No     Lack of Transportation (Non-Medical): No   Physical Activity: Not on file   Stress: Not on file   Social Connections: Not on file   Intimate Partner Violence: Not on file   Housing Stability: Not on file

## 2022-08-09 ENCOUNTER — APPOINTMENT (OUTPATIENT)
Dept: GENERAL RADIOLOGY | Facility: CLINIC | Age: 52
End: 2022-08-09
Attending: EMERGENCY MEDICINE
Payer: COMMERCIAL

## 2022-08-09 ENCOUNTER — HOSPITAL ENCOUNTER (EMERGENCY)
Facility: CLINIC | Age: 52
Discharge: HOME | End: 2022-08-09
Attending: EMERGENCY MEDICINE | Admitting: EMERGENCY MEDICINE
Payer: COMMERCIAL

## 2022-08-09 ENCOUNTER — APPOINTMENT (OUTPATIENT)
Dept: MRI IMAGING | Facility: CLINIC | Age: 52
End: 2022-08-09
Attending: EMERGENCY MEDICINE
Payer: COMMERCIAL

## 2022-08-09 VITALS
DIASTOLIC BLOOD PRESSURE: 84 MMHG | RESPIRATION RATE: 18 BRPM | TEMPERATURE: 98 F | HEART RATE: 88 BPM | OXYGEN SATURATION: 97 % | WEIGHT: 169 LBS | SYSTOLIC BLOOD PRESSURE: 122 MMHG | BODY MASS INDEX: 23.57 KG/M2

## 2022-08-09 DIAGNOSIS — S83.8X2A SPRAIN OF OTHER LIGAMENT OF LEFT KNEE, INITIAL ENCOUNTER: ICD-10-CM

## 2022-08-09 LAB — SARS-COV-2 RNA RESP QL NAA+PROBE: NEGATIVE

## 2022-08-09 PROCEDURE — 99285 EMERGENCY DEPT VISIT HI MDM: CPT | Mod: 25 | Performed by: EMERGENCY MEDICINE

## 2022-08-09 PROCEDURE — 99284 EMERGENCY DEPT VISIT MOD MDM: CPT | Mod: CS | Performed by: EMERGENCY MEDICINE

## 2022-08-09 PROCEDURE — 87635 SARS-COV-2 COVID-19 AMP PRB: CPT | Performed by: EMERGENCY MEDICINE

## 2022-08-09 PROCEDURE — 73721 MRI JNT OF LWR EXTRE W/O DYE: CPT | Mod: LT

## 2022-08-09 PROCEDURE — 250N000013 HC RX MED GY IP 250 OP 250 PS 637: Performed by: EMERGENCY MEDICINE

## 2022-08-09 PROCEDURE — 73562 X-RAY EXAM OF KNEE 3: CPT | Mod: LT

## 2022-08-09 PROCEDURE — 29505 APPLICATION LONG LEG SPLINT: CPT | Mod: LT | Performed by: EMERGENCY MEDICINE

## 2022-08-09 PROCEDURE — C9803 HOPD COVID-19 SPEC COLLECT: HCPCS | Performed by: EMERGENCY MEDICINE

## 2022-08-09 PROCEDURE — 250N000011 HC RX IP 250 OP 636: Performed by: EMERGENCY MEDICINE

## 2022-08-09 RX ORDER — ONDANSETRON 4 MG/1
4 TABLET, ORALLY DISINTEGRATING ORAL ONCE
Status: COMPLETED | OUTPATIENT
Start: 2022-08-09 | End: 2022-08-09

## 2022-08-09 RX ORDER — OXYCODONE HYDROCHLORIDE 5 MG/1
5 TABLET ORAL ONCE
Status: COMPLETED | OUTPATIENT
Start: 2022-08-09 | End: 2022-08-09

## 2022-08-09 RX ADMIN — OXYCODONE HYDROCHLORIDE 5 MG: 5 TABLET ORAL at 17:10

## 2022-08-09 RX ADMIN — ONDANSETRON 4 MG: 4 TABLET, ORALLY DISINTEGRATING ORAL at 17:10

## 2022-08-09 ASSESSMENT — ACTIVITIES OF DAILY LIVING (ADL)
ADLS_ACUITY_SCORE: 37
ADLS_ACUITY_SCORE: 37

## 2022-08-09 NOTE — DISCHARGE INSTRUCTIONS
Ice, rest the knee.  Fortunately MRI reveals no serious damage.  Use the knee immobilizer as needed.

## 2022-08-09 NOTE — ED PROVIDER NOTES
History     Chief Complaint   Patient presents with     Head Injury     Knee Injury     HPI  Parker Acevedo is a 51 year old male who presents for evaluation of a knee injury.  He fell 2 days ago in his kitchen.  He states his left knee was bent backwards awkwardly.  He continues with swelling and severe pain.  He states it hurts to ambulate or move in any way.  No history of severe knee injury otherwise.    Also smacked his head when he fell down.  There was no loss of consciousness.  No vomiting.  He has been clear per him and his wife.  He actually does not have any headache.  He states his headaches actually better since he fell.  There is some swelling to the right frontal area.    Allergies:  Allergies   Allergen Reactions     Bactrim [Sulfamethoxazole W/Trimethoprim] Rash     Penicillins Anaphylaxis     Please see Antimicrobial Management Team allergy assessment note 10/10/2018. Patient reported tolerating amoxicillin.     Ertapenem Nausea and Vomiting     Doxycycline Rash     Vancomycin Rash     Rash after receiving vancomycin 3/28/16 (infusion reaction?). Tolerated with slower infusion and diphenhydramine premed.       Problem List:    Patient Active Problem List    Diagnosis Date Noted     Hepatitis B, chronic (H) 08/02/2022     Priority: Medium     Gram-negative bacteremia 07/07/2022     Priority: Medium     Bacteremia associated with intravascular line, initial encounter (H) 05/07/2022     Priority: Medium     Acute deep vein thrombosis (DVT) of axillary vein of right upper extremity (H) 02/28/2022     Priority: Medium     Fever, unknown origin 03/19/2021     Priority: Medium     Short bowel syndrome 01/30/2021     Priority: Medium     Bloodstream infection associated with central venous catheter, initial encounter 01/30/2021     Priority: Medium     Gastric outlet obstruction 10/07/2020     Priority: Medium     Added automatically from request for surgery [id number]       Gram-positive bacteremia  09/29/2019     Priority: Medium     On total parenteral nutrition 09/29/2019     Priority: Medium     Added automatically from request for surgery [id number]       PICC line infiltration, sequela 07/22/2019     Priority: Medium     Infection by Candida species 01/04/2019     Priority: Medium     Bacteremia 10/10/2018     Priority: Medium     Hyperlipidemia LDL goal <130 08/07/2018     Priority: Medium     S/P bariatric surgery 07/30/2018     Priority: Medium     Generalized weakness 01/30/2018     Priority: Medium     Catheter-related bloodstream infection (CRBSI) 09/23/2017     Priority: Medium     Low serum iron 09/08/2017     Priority: Medium     Anemia, iron deficiency 09/08/2017     Priority: Medium     Abnormal echocardiogram 04/11/2017     Priority: Medium     Port or reservoir infection, initial encounter 03/16/2017     Priority: Medium     Status post cervical spinal arthrodesis 02/15/2017     Priority: Medium     Short gut syndrome      Priority: Medium     Anxiety      Priority: Medium     Chronic nausea 05/10/2016     Priority: Medium     Fungemia 04/11/2016     Priority: Medium     Positive blood culture 03/29/2016     Priority: Medium     Insomnia 08/13/2015     Priority: Medium     Chronic pain 07/07/2015     Priority: Medium     Patient is followed by Dr. Milligan for ongoing prescription of pain medication.  All refills should be approved by this provider, or covering partner.    Medication(s): Fentanyl 50 mcg patches every three days/ Liquid oxycodone 5 mg/5ml up to 50 mg daily.   Maximum quantity per month: as per Dr. Milligan through [facility]  Clinic visit frequency required: Q 3 months at Pain Management    Controlled substance agreement on file: Yes       Date(s): Through Pain Management    Pain Clinic evaluation in the past: Yes       Date(s):  Ongoing every three months       Location(s):  Per pain management    DIRE Total Score(s):  No flowsheet data found.    Last [facility] website  verification:  Through Pain Management   [email or web address]    Esteban Daly MD             Health Care Home 02/24/2015     Priority: Medium              Iron deficiency 05/23/2014     Priority: Medium     Vitamin D deficiency 05/22/2014     Priority: Medium     Former smoker 02/24/2014     Priority: Medium     Bile reflux esophagitis 10/16/2013     Priority: Medium     Constipation 10/01/2013     Priority: Medium     Chronic anxiety 09/25/2013     Priority: Medium     Dysphagia 09/17/2013     Priority: Medium     Weight loss, non-intentional 09/17/2013     Priority: Medium     Malnutrition (H) 09/17/2013     Priority: Medium     Dehydration 08/28/2013     Priority: Medium     Vitamin B12 deficiency without anemia 07/31/2013     Priority: Medium     Diagnosis updated by automated process. Provider to review and confirm.       Thiamine deficiency 07/31/2013     Priority: Medium     ADHD (attention deficit hyperactivity disorder), inattentive type 07/02/2013     Priority: Medium     Patient is followed by [facility] for ongoing prescription of stimulants.  All refills should be approved by this provider, or covering partner.    Medication(s): Adderall.   Maximum quantity per month: 30  Clinic visit frequency required:      Controlled substance agreement on file: No  Neuropsych evaluation for ADD completed:  No    Last [facility] website verification:  done on 5/6/19  [email or web address]         Anemia 09/20/2012     Priority: Medium     Vomiting 06/28/2012     Priority: Medium     Chronic abdominal pain 06/01/2012     Priority: Medium     Patient is followed by ALEXANDRIA WHITLEY for ongoing prescription of narcotic pain medicine.  Med: liquid oxycodone up to 60 mg per day.   Maximum use per month:   Expected duration: ongoing, hope per surgery that will resolve with upcoming surgery  Narcotic agreement on file: YES  Clinic visit recommended: Q 3 months         Peptic ulcer disease  04/08/2010     Priority: Medium     Gastric bypass status for obesity 04/08/2010     Priority: Medium     Top weight of 492.          Past Medical History:    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)      Chronic abdominal pain      CLABSI (central line-associated bloodstream infection)      Complication of anesthesia      Difficulty swallowing      Gastric ulcer, unspecified as acute or chronic, without mention of hemorrhage, perforation, or obstruction      Gastro-oesophageal reflux disease      Head injury      Hiatal hernia      Other bladder disorder      Other chronic pain      PONV (postoperative nausea and vomiting)      Severe malnutrition (H)      Short gut syndrome      Tobacco abuse        Past Surgical History:    Past Surgical History:   Procedure Laterality Date     AMPUTATION       APPENDECTOMY       BACK SURGERY  11/3/2014    curve in the spine     BIOPSY LYMPH NODE CERVICAL N/A 2/20/2015    Procedure: BIOPSY LYMPH NODE CERVICAL;  Surgeon: Baron Scanlon MD;  [facility]     CHOLECYSTECTOMY       COLONOSCOPY N/A 7/14/2021    Procedure: COLONOSCOPY;  Surgeon: Jimbo Estrada MD;  Location: [facility] OR     COLONOSCOPY N/A 4/13/2022    Procedure: COLONOSCOPY;  Surgeon: Jimbo Estrada MD;  Location: [facility] OR     DISCECTOMY, FUSION CERVICAL ANTERIOR ONE LEVEL, COMBINED N/A 2/15/2017    Procedure: COMBINED DISCECTOMY, FUSION CERVICAL ANTERIOR ONE LEVEL;  Surgeon: Darren Campos MD;  [facility]     ENDOSCOPIC INSERTION TUBE GASTROSTOMY  9/9/2013    Procedure: ENDOSCOPIC INSERTION TUBE GASTROSTOMY;;  Surgeon: Francis Vyas MD;  [facility]     ENDOSCOPIC ULTRASOUND UPPER GASTROINTESTINAL TRACT (GI)  4/29/2011    Procedure:ENDOSCOPIC ULTRASOUND UPPER GASTROINTESTINAL TRACT (GI); Both Procedures done Conjointly; Surgeon:NEREIDA HOUSER; [facility]      ENDOSCOPIC ULTRASOUND UPPER GASTROINTESTINAL TRACT (GI)  9/9/2013    Procedure: ENDOSCOPIC ULTRASOUND UPPER GASTROINTESTINAL TRACT (GI);  Endoscopic Ultrasound Guide Gastrostomy Tube Placement  C-arm;  Surgeon: Noe Lizarraga MD;  [facility]     ENDOSCOPIC ULTRASOUND UPPER GASTROINTESTINAL TRACT (GI) N/A 2/24/2021    Procedure: ENDOSCOPIC ULTRASOUND, ESOPHAGOSCOPY / UPPER GASTROINTESTINAL TRACT (GI), esophagastrogastroduodenoscopy;  Surgeon: Berny Bach MD;  [facility]     ENDOSCOPY  03/25/11    EGD, [facility]     ENDOSCOPY  08/04/09    Upper Endoscopy, [facility]     ENDOSCOPY  01/05/09    Upper Endoscopy, [facility]     ESOPHAGOSCOPY, GASTROSCOPY, DUODENOSCOPY (EGD), COMBINED  4/20/2011    Procedure:COMBINED ESOPHAGOSCOPY, GASTROSCOPY, DUODENOSCOPY (EGD); Surgeon:BLU VYAS; [facility]     ESOPHAGOSCOPY, GASTROSCOPY, DUODENOSCOPY (EGD), COMBINED  6/15/2011    Procedure:COMBINED ESOPHAGOSCOPY, GASTROSCOPY, DUODENOSCOPY (EGD); Surgeon:BLU VYAS; [facility]     ESOPHAGOSCOPY, GASTROSCOPY, DUODENOSCOPY (EGD), COMBINED  6/12/2013    Procedure: COMBINED ESOPHAGOSCOPY, GASTROSCOPY, DUODENOSCOPY (EGD);;  Surgeon: Blu Vyas MD;  [facility]     ESOPHAGOSCOPY, GASTROSCOPY, DUODENOSCOPY (EGD), COMBINED  11/22/2013    Procedure: COMBINED ESOPHAGOSCOPY, GASTROSCOPY, DUODENOSCOPY (EGD);;  Surgeon: Blu Vyas MD;  [facility]     ESOPHAGOSCOPY, GASTROSCOPY, DUODENOSCOPY (EGD), COMBINED  4/30/2014    Procedure: COMBINED ESOPHAGOSCOPY, GASTROSCOPY, DUODENOSCOPY (EGD);  Surgeon: Blu Vyas MD;  [facility]     ESOPHAGOSCOPY, GASTROSCOPY, DUODENOSCOPY (EGD), COMBINED N/A 2/20/2015    Procedure: COMBINED ESOPHAGOSCOPY, GASTROSCOPY, DUODENOSCOPY (EGD), BIOPSY SINGLE OR MULTIPLE;  Surgeon: Baron Scanlon MD;  [facility]     ESOPHAGOSCOPY, GASTROSCOPY, DUODENOSCOPY (EGD), COMBINED N/A 9/30/2015     Procedure: COMBINED ESOPHAGOSCOPY, GASTROSCOPY, DUODENOSCOPY (EGD);  Surgeon: Francis Vyas MD;  Location:  GI     ESOPHAGOSCOPY, GASTROSCOPY, DUODENOSCOPY (EGD), COMBINED N/A 10/3/2019    Procedure: Upper Endoscopy;  Surgeon: Clif Morrow MD;  [facility]     GASTRECTOMY  6/22/2012    Procedure: GASTRECTOMY;  Open Approach, Excise Ulcers,Partial Gastrectomy, Esophagojejunostomy, Hiatal Hernia Repair, Extensive Lysis of Adhesions and Esaphagogastrodudenoscopy.;  Surgeon: Francis Vyas MD;  [facility]     GASTROJEJUNOSTOMY  08/26/09    Extensice enterolysis, partial resect. jejunum, part. resect gastric pouch, gastrojejunostomy anastomosis     HC ESOPH/GAS REFLUX TEST W NASAL IMPED ELECTRODE  8/5/2013    Procedure: ESOPHAGEAL IMPEDENCE FUNCTION TEST 1 HOUR OR LESS;  Surgeon: Halie Lang MD;  Location:  GI     HEAD & NECK SURGERY  2/15/2017    C5-C6     HERNIA REPAIR  2006    Umbilical hernia     HERNIORRHAPHY HIATAL  6/22/2012    Procedure: HERNIORRHAPHY HIATAL;;  Surgeon: Francis Vysa MD;  [facility]     HERNIORRHAPHY INGUINAL  11/22/2013    Procedure: HERNIORRHAPHY INGUINAL;;  Surgeon: Francis Vyas MD;  [facility]     INSERT PICC LINE Right 12/19/2019    Procedure: Picc Placement;  Surgeon: Per Dumont PA-C;  [facility]     INSERT PICC LINE Right 2/21/2020    Procedure: INSERTION, PICC;  Surgeon: Per Dumont PA-C;  [facility]     INSERT PORT VASCULAR ACCESS Right 12/19/2017    Procedure: INSERT PORT VASCULAR ACCESS;  Right Chest Port Placement ;  Surgeon: Lisandro Alejandro PA-C;  [facility]     INSERT PORT VASCULAR ACCESS Right 8/2/2018    Procedure: INSERT PORT VASCULAR ACCESS;  Place single lumen tunneled central venous access catheter;  Surgeon: Guy Jamil PA-C;  [facility]     IR CVC TUNNEL PLACEMENT > 5 YRS OF AGE  8/7/2019     IR CVC TUNNEL PLACEMENT > 5 YRS OF AGE  4/14/2020     IR  CVC TUNNEL PLACEMENT > 5 YRS OF AGE  8/3/2020     IR CVC TUNNEL PLACEMENT > 5 YRS OF AGE  9/4/2020     IR CVC TUNNEL PLACEMENT > 5 YRS OF AGE  2/5/2021     IR CVC TUNNEL PLACEMENT > 5 YRS OF AGE  3/23/2021     IR CVC TUNNEL PLACEMENT > 5 YRS OF AGE  1/13/2022     IR CVC TUNNEL PLACEMENT > 5 YRS OF AGE  5/12/2022     IR CVC TUNNEL PLACEMENT > 5 YRS OF AGE  7/13/2022     IR CVC TUNNEL REMOVAL RIGHT  10/1/2019     IR CVC TUNNEL REMOVAL RIGHT  7/30/2020     IR CVC TUNNEL REMOVAL RIGHT  9/2/2020     IR CVC TUNNEL REMOVAL RIGHT  2/3/2021     IR CVC TUNNEL REMOVAL RIGHT  3/19/2021     IR CVC TUNNEL REMOVAL RIGHT  1/10/2022     IR CVC TUNNEL REMOVAL RIGHT  5/9/2022     IR CVC TUNNEL REMOVAL RIGHT  7/8/2022     IR CVC TUNNEL REVISION RIGHT  5/7/2021     IR FOLLOW UP VISIT OUTPATIENT  8/7/2019     IR PICC PLACEMENT > 5 YRS OF AGE  3/7/2019     IR PICC PLACEMENT > 5 YRS OF AGE  12/19/2019     IR PICC PLACEMENT > 5 YRS OF AGE  2/21/2020     LAPAROTOMY EXPLORATORY  11/22/2013    Procedure: LAPAROTOMY EXPLORATORY;  Exploratory Laparotomy, Upper Endoscopy, Left Inguinal Hernia Repair;  Surgeon: Francis Vyas MD;  [facility]     ORTHOPEDIC SURGERY       PICC INSERTION Right 03/16/2017    5fr DL BioFlo PICC, 42cm (3cm external) in the R medial brachial vein w/ tip in the SVC RA junction.     PICC INSERTION Left 09/23/2017    5fr DL BioFlo PICC, 45cm (1cm external) in the L basilic vein w/ tip in the SVC RA junction.     PICC INSERTION Right 05/16/2019    5Fr - 43cm, Medial brachial vein, low SVC     PICC INSERTION Right 10/02/2019    5Fr - 43cm (2cm external), basilic vein, low SVC     SHAYLEE EN Y BOWEL  2003     SOFT TISSUE SURGERY       THORACIC SURGERY       TONSILLECTOMY       TRANSESOPHAGEAL ECHOCARDIOGRAM INTRAOPERATIVE N/A 1/8/2019    Procedure: TRANSESOPHAGEAL ECHOCARDIOGRAM INTRAOPERATIVE;  Surgeon: GENERIC ANESTHESIA PROVIDER;  [facility]     ZZC GASTRIC BYPASS,OBESE<100CM SHAYLEE-EN-Y  2002    lost 300 pounds  "      Family History:    Family History   Problem Relation Age of Onset     Gastrointestinal Disease Mother         Crohns disease     Anxiety Disorder Mother      Thyroid Disease Mother         Grave's disease     Cancer Father         ear cancer-skin cancer/melanoma     Breast Cancer Maternal Grandmother      Macular Degeneration Maternal Grandfather      Anxiety Disorder Sister      Diabetes Maternal Uncle      Breast Cancer Other      Hypertension No family hx of      Hyperlipidemia No family hx of      Cerebrovascular Disease No family hx of      Prostate Cancer No family hx of      Depression No family hx of      Anesthesia Reaction No family hx of      Asthma No family hx of      Osteoporosis No family hx of      Genetic Disorder No family hx of      Obesity No family hx of      Mental Illness No family hx of      Substance Abuse No family hx of      Glaucoma No family hx of        Social History:  Marital Status:   [2]  Social History     Tobacco Use     Smoking status: Light Tobacco Smoker     Packs/day: 0.10     Years: 3.00     Pack years: 0.30     Types: Cigarettes     Smokeless tobacco: Never Used     Tobacco comment: 2/4/2021    smokes 3 cigarettes/day   Vaping Use     Vaping Use: Never used   Substance Use Topics     Alcohol use: No     Comment: quit 2002     Drug use: No        Medications:    acetaminophen (TYLENOL) 32 mg/mL liquid  albuterol (PROAIR HFA/PROVENTIL HFA/VENTOLIN HFA) 108 (90 Base) MCG/ACT inhaler  amphetamine-dextroamphetamine (ADDERALL) 20 MG tablet  carvedilol (COREG) 6.25 MG tablet  cyanocobalamin (CYANOCOBALAMIN) 1000 MCG/ML injection  diphenhydrAMINE (BENADRYL) 50 MG capsule  enoxaparin ANTICOAGULANT (LOVENOX) 80 MG/0.8ML syringe  EPINEPHrine (ANY BX GENERIC EQUIV) 0.3 MG/0.3ML injection 2-pack  [facility] INFUSION MANAGED PATIENT  fentaNYL (DURAGESIC) 25 mcg/hr 72 hr patch  insulin syringe-needle U-100 (29G X 1/2\" 1 ML) 29G X 1/2\" 1 ML miscellaneous  lactated ringers " infusion  lidocaine (LIDODERM) 5 % patch  LORazepam (ATIVAN) 1 MG tablet  multivitamin, therapeutic (THERA-VIT) TABS tablet  naloxone (NARCAN) 4 MG/0.1ML nasal spray  nystatin (MYCOSTATIN) 493004 UNIT/GM external cream  ondansetron (ZOFRAN-ODT) 8 MG ODT tab  oxyCODONE (ROXICODONE) 5 MG/5ML solution  pantoprazole (PROTONIX) 40 MG EC tablet  parenteral nutrition - PTA/DISCHARGE ORDER  polyethylene glycol (MIRALAX) 17 GM/Dose powder  sucralfate (CARAFATE) 1 GM/10ML suspension  vitamin D2 (ERGOCALCIFEROL) 76794 units (1250 mcg) capsule          Review of Systems  All other systems are reviewed and are negative    Physical Exam   BP: 126/80  Pulse: 88  Temp: 98  F (36.7  C)  Resp: 18  Weight: 76.7 kg (169 lb)      Physical Exam  Vitals and nursing note reviewed.   Constitutional:       General: He is not in acute distress.     Appearance: He is well-developed. He is not diaphoretic.   HENT:      Head:      Comments: Soft tissue swelling and ecchymosis to the right frontal region.  No bony step-off or crepitus     Right Ear: No hemotympanum.      Left Ear: No hemotympanum.   Eyes:      General: No scleral icterus.        Right eye: No discharge.         Left eye: No discharge.      Conjunctiva/sclera: Conjunctivae normal.   Cardiovascular:      Rate and Rhythm: Normal rate and regular rhythm.      Heart sounds: Normal heart sounds. No murmur heard.  Pulmonary:      Effort: Pulmonary effort is normal. No respiratory distress.      Breath sounds: Normal breath sounds. No stridor.   Abdominal:      Palpations: Abdomen is soft.      Tenderness: There is no abdominal tenderness.   Musculoskeletal:         General: Normal range of motion.      Cervical back: Normal range of motion and neck supple.      Comments: Left knee reveals significant effusion and decreased range of motion.  No obvious bony deformity.  Distal CMS intact   Skin:     General: Skin is warm and dry.      Coloration: Skin is not pale.      Findings: No  erythema or rash.   Neurological:      Mental Status: He is alert.      Cranial Nerves: No cranial nerve deficit.      Motor: No abnormal muscle tone.         ED Course                 Procedures              Critical Care time:  none               Results for orders placed or performed during the hospital encounter of 08/09/22 (from the past 24 hour(s))   XR Knee Left 3 Views    Narrative    KNEE THREE VIEWS LEFT  8/9/2022 3:25 PM     HISTORY: trauma, pain  COMPARISON: 2/7/2015.      Impression    IMPRESSION: No acute fracture or malalignment. Moderate to large knee  joint effusion. There is normal joint spacing.    STEPH GUY MD         SYSTEM ID:  [id number]   MR Knee Left w/o Contrast    Narrative    EXAM: MR KNEE LEFT W/O CONTRAST  LOCATION: [facility]  DATE/TIME: 8/9/2022 5:16 PM    INDICATION: Pain following injury 2 days prior.  COMPARISON: 08/09/2022 radiographs.  TECHNIQUE: Unenhanced.    FINDINGS:    MEDIAL COMPARTMENT:   -Meniscus: Normal.  -Cartilage: Normal.    LATERAL COMPARTMENT:  -Meniscus: Normal.   -Cartilage: Normal.    PATELLOFEMORAL COMPARTMENT:   -Alignment: Patella midline. No subluxation or tilting.   -Cartilage: Normal.    CRUCIATE LIGAMENTS:   -ACL: Normal.  -PCL: Normal.    COLLATERAL LIGAMENTS:   -Medial collateral ligament: Superficial and deep fibers are normal.  -Lateral collateral ligament: Normal.    POSTEROMEDIAL CORNER:  -Distal semimembranosus tendon is normal.   -Pes anserine tendons are normal. Posteromedial corner complex ligaments are intact.    POSTEROLATERAL CORNER:   -Popliteal tendon is intact. No tendinopathy.  -Biceps femoris tendon and posterolateral corner complex ligaments are intact.    EXTENSOR MECHANISM:   -Quadriceps tendon: Normal.  -Patellar tendon: Normal.  -Patellofemoral ligaments and retinacula: Intact.    JOINT:   -Moderate size knee joint effusion. No synovitis or loose body. No popliteal cyst.    BONES:  -No fracture  or concerning marrow replacing lesion.    SOFT TISSUES:   -Prominent diffuse subcutaneous edema both deep and superficial. No subcutaneous hematoma. No muscle strain. No muscular atrophy.       Impression    IMPRESSION:  1.  Knee joint effusion and diffuse subcutaneous edema.  2.  No evidence for internal derangement.     *Note: Due to a large number of results and/or encounters for the requested time period, some results have not been displayed. A complete set of results can be found in Results Review.       Medications   oxyCODONE (ROXICODONE) tablet 5 mg (5 mg Oral Given 8/9/22 1710)   ondansetron (ZOFRAN ODT) ODT tab 4 mg (4 mg Oral Given 8/9/22 1710)       Assessments & Plan (with Medical Decision Making)  51-year-old male with left knee sprain.  Fortunately MRI reveals no serious internal derangement.  Given knee immobilizer he can use as needed.  Referred to orthopedic if not improving.  Also with right forehead contusion.  No evidence for serious internal injury.     I have reviewed the nursing notes.    I have reviewed the findings, diagnosis, plan and need for follow up with the patient.       New Prescriptions    No medications on file       Final diagnoses:   Sprain of other ligament of left knee, initial encounter       8/9/2022   [facility] EMERGENCY DEPT     Oliver Rosario MD  08/09/22 [id number]

## 2022-08-09 NOTE — TELEPHONE ENCOUNTER
Yes that is ok from old notes.     Cosentyx Counseling:  I discussed with the patient the risks of Cosentyx including but not limited to worsening of Crohn's disease, immunosuppression, allergic reactions and infections.  The patient understands that monitoring is required including a PPD at baseline and must alert us or the primary physician if symptoms of infection or other concerning signs are noted.

## 2022-08-09 NOTE — ED TRIAGE NOTES
Pt presents with concern after a fall 2 days ago. Pt hit his head, no LOC and has left knee swelling. Pt has no neurologic changes.      Triage Assessment     Row Name 08/09/22 1458       Triage Assessment (Adult)    Airway WDL WDL       Respiratory WDL    Respiratory WDL WDL       Cardiac WDL    Cardiac WDL WDL

## 2022-08-10 ENCOUNTER — HOSPITAL ENCOUNTER (OUTPATIENT)
Facility: CLINIC | Age: 52
Discharge: HOME | End: 2022-08-10
Attending: INTERNAL MEDICINE | Admitting: RADIOLOGY
Payer: COMMERCIAL

## 2022-08-10 ENCOUNTER — VIRTUAL VISIT (OUTPATIENT)
Dept: INFECTIOUS DISEASES | Facility: CLINIC | Age: 52
End: 2022-08-10
Attending: INTERNAL MEDICINE
Payer: COMMERCIAL

## 2022-08-10 ENCOUNTER — APPOINTMENT (OUTPATIENT)
Dept: INTERVENTIONAL RADIOLOGY/VASCULAR | Facility: CLINIC | Age: 52
End: 2022-08-10
Attending: NURSE PRACTITIONER
Payer: COMMERCIAL

## 2022-08-10 ENCOUNTER — APPOINTMENT (OUTPATIENT)
Dept: MEDSURG UNIT | Facility: CLINIC | Age: 52
End: 2022-08-10
Attending: INTERNAL MEDICINE
Payer: COMMERCIAL

## 2022-08-10 VITALS
HEIGHT: 71 IN | WEIGHT: 169.2 LBS | OXYGEN SATURATION: 100 % | HEART RATE: 76 BPM | BODY MASS INDEX: 23.69 KG/M2 | RESPIRATION RATE: 12 BRPM | DIASTOLIC BLOOD PRESSURE: 77 MMHG | TEMPERATURE: 98.8 F | SYSTOLIC BLOOD PRESSURE: 129 MMHG

## 2022-08-10 DIAGNOSIS — Z78.9 PROBLEM WITH VASCULAR ACCESS: ICD-10-CM

## 2022-08-10 DIAGNOSIS — R16.0 HEPATOMEGALY: Primary | ICD-10-CM

## 2022-08-10 PROCEDURE — 99153 MOD SED SAME PHYS/QHP EA: CPT

## 2022-08-10 PROCEDURE — 77001 FLUOROGUIDE FOR VEIN DEVICE: CPT | Mod: 26 | Performed by: RADIOLOGY

## 2022-08-10 PROCEDURE — 36581 REPLACE TUNNELED CV CATH: CPT

## 2022-08-10 PROCEDURE — 250N000009 HC RX 250

## 2022-08-10 PROCEDURE — 999N000132 HC STATISTIC PP CARE STAGE 1

## 2022-08-10 PROCEDURE — 99152 MOD SED SAME PHYS/QHP 5/>YRS: CPT

## 2022-08-10 PROCEDURE — 999N000142 HC STATISTIC PROCEDURE PREP ONLY

## 2022-08-10 PROCEDURE — 99215 OFFICE O/P EST HI 40 MIN: CPT | Mod: 95 | Performed by: INTERNAL MEDICINE

## 2022-08-10 PROCEDURE — 99152 MOD SED SAME PHYS/QHP 5/>YRS: CPT | Mod: GC | Performed by: RADIOLOGY

## 2022-08-10 PROCEDURE — C1769 GUIDE WIRE: HCPCS

## 2022-08-10 PROCEDURE — 250N000011 HC RX IP 250 OP 636

## 2022-08-10 PROCEDURE — 77001 FLUOROGUIDE FOR VEIN DEVICE: CPT

## 2022-08-10 PROCEDURE — 250N000011 HC RX IP 250 OP 636: Performed by: RADIOLOGY

## 2022-08-10 PROCEDURE — 258N000003 HC RX IP 258 OP 636: Performed by: NURSE PRACTITIONER

## 2022-08-10 PROCEDURE — C1751 CATH, INF, PER/CENT/MIDLINE: HCPCS

## 2022-08-10 PROCEDURE — 36581 REPLACE TUNNELED CV CATH: CPT | Mod: GC | Performed by: RADIOLOGY

## 2022-08-10 RX ORDER — FLUMAZENIL 0.1 MG/ML
0.2 INJECTION, SOLUTION INTRAVENOUS
Status: DISCONTINUED | OUTPATIENT
Start: 2022-08-10 | End: 2022-08-10 | Stop reason: HOSPADM

## 2022-08-10 RX ORDER — NALOXONE HYDROCHLORIDE 0.4 MG/ML
0.2 INJECTION, SOLUTION INTRAMUSCULAR; INTRAVENOUS; SUBCUTANEOUS
Status: DISCONTINUED | OUTPATIENT
Start: 2022-08-10 | End: 2022-08-10 | Stop reason: HOSPADM

## 2022-08-10 RX ORDER — NALOXONE HYDROCHLORIDE 0.4 MG/ML
0.4 INJECTION, SOLUTION INTRAMUSCULAR; INTRAVENOUS; SUBCUTANEOUS
Status: DISCONTINUED | OUTPATIENT
Start: 2022-08-10 | End: 2022-08-10 | Stop reason: HOSPADM

## 2022-08-10 RX ORDER — FENTANYL CITRATE 50 UG/ML
25-50 INJECTION, SOLUTION INTRAMUSCULAR; INTRAVENOUS EVERY 5 MIN PRN
Status: DISCONTINUED | OUTPATIENT
Start: 2022-08-10 | End: 2022-08-10 | Stop reason: HOSPADM

## 2022-08-10 RX ORDER — LIDOCAINE 40 MG/G
CREAM TOPICAL
Status: DISCONTINUED | OUTPATIENT
Start: 2022-08-10 | End: 2022-08-10 | Stop reason: HOSPADM

## 2022-08-10 RX ORDER — HEPARIN SODIUM,PORCINE 10 UNIT/ML
5 VIAL (ML) INTRAVENOUS
Status: COMPLETED | OUTPATIENT
Start: 2022-08-10 | End: 2022-08-10

## 2022-08-10 RX ORDER — ACETAMINOPHEN 325 MG/1
650 TABLET ORAL
Status: DISCONTINUED | OUTPATIENT
Start: 2022-08-10 | End: 2022-08-10 | Stop reason: HOSPADM

## 2022-08-10 RX ORDER — SODIUM CHLORIDE 9 MG/ML
INJECTION, SOLUTION INTRAVENOUS CONTINUOUS
Status: DISCONTINUED | OUTPATIENT
Start: 2022-08-10 | End: 2022-08-10 | Stop reason: HOSPADM

## 2022-08-10 RX ADMIN — FENTANYL CITRATE 50 MCG: 50 INJECTION, SOLUTION INTRAMUSCULAR; INTRAVENOUS at 16:05

## 2022-08-10 RX ADMIN — SODIUM CHLORIDE: 9 INJECTION, SOLUTION INTRAVENOUS at 14:03

## 2022-08-10 RX ADMIN — FENTANYL CITRATE 50 MCG: 50 INJECTION, SOLUTION INTRAMUSCULAR; INTRAVENOUS at 16:42

## 2022-08-10 RX ADMIN — MIDAZOLAM HYDROCHLORIDE 1 MG: 1 INJECTION, SOLUTION INTRAMUSCULAR; INTRAVENOUS at 16:05

## 2022-08-10 RX ADMIN — Medication 4 ML: at 16:41

## 2022-08-10 RX ADMIN — LIDOCAINE HYDROCHLORIDE 10 ML: 10 INJECTION, SOLUTION EPIDURAL; INFILTRATION; INTRACAUDAL; PERINEURAL at 16:10

## 2022-08-10 RX ADMIN — MIDAZOLAM HYDROCHLORIDE 1 MG: 1 INJECTION, SOLUTION INTRAMUSCULAR; INTRAVENOUS at 16:42

## 2022-08-10 ASSESSMENT — ACTIVITIES OF DAILY LIVING (ADL)
ADLS_ACUITY_SCORE: 37

## 2022-08-10 NOTE — PROCEDURES
[facility]    Procedure: Tunneled CVC revision    Date/Time: 8/10/2022 4:53 PM  Performed by: Baron Walker DO  Authorized by: Baron Walker DO       UNIVERSAL PROTOCOL   Site Marked: NA  Prior Images Obtained and Reviewed:  Yes  Required items: Required blood products, implants, devices and special equipment available    Patient identity confirmed:  Verbally with patient, arm band, provided demographic data and hospital-assigned identification number  Patient was reevaluated immediately before administering moderate or deep sedation or anesthesia  Confirmation Checklist:  Patient's identity using two indicators, relevant allergies, procedure was appropriate and matched the consent or emergent situation and correct equipment/implants were available  Time out: Immediately prior to the procedure a time out was called    Universal Protocol: the Joint Commission Universal Protocol was followed    Preparation: Patient was prepped and draped in usual sterile fashion       ANESTHESIA    Anesthesia: Local infiltration  Local Anesthetic:  Lidocaine 1% without epinephrine      SEDATION  Patient Sedated: Yes    Vital signs: Vital signs monitored during sedation    See dictated procedure note for full details.  Findings: Existing 6 Fr DL tunneled Cm CVC TIP flipped into the azygous vein. Over the wire exchange through the existing tunnel for a new catheter of the same variety, cut one cm longer (27cm cuff to tip). Both lumens aspirated and flushed easily.    Specimens: none    Complications: None    Condition: Stable    Plan: Routine aftercare. Line ready for immediate use.      PROCEDURE    Patient Tolerance:  Patient tolerated the procedure well with no immediate complications  Length of time physician/provider present for 1:1 monitoring during sedation: 45

## 2022-08-10 NOTE — PROGRESS NOTES
Pt agreed to stay until 1730. Ok to discharge at this time per MD. Other discharge  Criteria met. Reviewed discharge instructions with pt. Pt verbalized understanding of discharge instructions and signed papers. PIV removed. Pushed to front door in wheelchair. Wife will provide transportation.

## 2022-08-10 NOTE — LETTER
8/10/2022       RE: Pakrer Acevedo  [address]  [facility] [address]     Dear Colleague,    Thank you for referring your patient, Parker Acevedo, to the [facility] INFECTIOUS DISEASE CLINIC [place] at [facility]. Please see a copy of my visit note below.       GENERAL ID CLINIC           Assessment and Recommendations:   Problem List:    1. Hepatitis B surface antigen positive, but negative HBV viral load (PCR), Hepatitis B e antigen and antibody and hepatitis B core antibody  2. CLABSI, polymicrobial with Enterobacter, Lactococcus, Bacillus in July 2022 - Cm removed on 7/8 - blood cultures cleared on 7/8 - completed 2 weeks of IV antibiotic treatment on 7/22/22  4. H/o recurrent and frequent CLABSI  5. H/o R subcalvian vein thrombus  6. Allergies listed to penicillins, TMP-SMX, doxycycline, vancomycin (tolerates slow infusion and premedication)        Discussion:    Mr. Parker Acevedo51 yo M with a h/o prior bariatric surgery on chronic TPN c/b short-gut syndrome and frequent hospitalizations for recurrent CLABSI who ws hospitalized from 7/7/22-7/13/22, presenting with  with fevers and concern for a new line (R Cm) infection. Blood cultures were repeated on arrival to ED. Patient arrived incoherent and disoriented though afebrile and hemodynamically stable. No leukocytosis, CRP elevated. He was started on empiric antibiotics, including vancomycin and meropenem. Blood cultures from 7/5 and 7/7 polymicrobial; with  Enterobacter (sensitive to cefepime), Lactococcus lactis (sensitive to penicillin and vancomycin) and Bacillus cereus sensitive to vancomycin. Source of bacteremia likely new CLABSI so the Cm catheter was removed on 7/8/22. Blood cultures became negative on 7/8. He is afebrile since 7/9. Meropenem was narrowed to cefepime on 7/8/22 (covering the Enterobacter) and he continued to be on vancomycin (covering the Bacillus  "cereus and Lactococcus). TTE was negative for vegetations. Inpatient ID team signed off on 7/11/22 recommending 14 days of treatment with cefepime and vancomycin, however OPAT/Home infusion team contacted us on 7/11/22 and they asked if there is an alternative to cefepime with lower number of infusions to facilitate the administration when patient is dismissed. Cefepime was then replaced with ertapenem. Vancomycin will need to be continued and the plan was to complete 14 days of IV antibiotics (end date 7/22/22). On 7/16/22, I  received a call from home infusion team informing me that the patient was not tolerating ertapenem and he stated he does not want that antibiotic anymore. I then recommended to stop ertapanem and start cefepime (continueing vancomycin) with the same end date on 7/22/22. Patient completed treatment on that date.     On additional review of patient's labs, I noted that he had a hepatitis B surface antigen positive from 7/8/22. The HCV serology from the same date was negative.  The hepatitis B surface antibody was negative. For completeness, I placed an add on order for HBV viral load (PCR) as well as Hepatitis B e antigen and antibody and hepatitis B core antibody - they all came back negative. For completeness I also added HIV serology which was also negative. The initial hepatitis B test was obtained because his LFTs were abnormal on his admission. They are now normalized. Also an abdominal US was performed on 7/8/22 and showed: \"Hepatomegaly measuring up to 23.3 cm and borderline splenomegaly measuring 13 cm. 2.  Nonspecific increased hepatic periportal echogenicity which may be seen with congestive or inflammatory etiology. 3. Persistent prominent common bile duct measuring up to 9 mm likely represent postcholecystectomy reservoir effect. 4. Patent hepatic vasculature by Doppler evaluation. 5. Prominent appearing IVC, may represent congestive changes\". Patient does not have a previous Us " abdomen and the image above was compared with the Ct abdomen/pelvis from Feb 2019.    On today's visit, the patient feels overall well and denies fever, chills or night sweats. He denies new symptoms. The visit was converted to telephone visit by our facilitator likely due to technical difficulties.         Recommendations:     1. Patient already completed IV antibiotic treatment for bacteremia on 7/22/22     2. In regards of isolated positive hepatitis B surface antigen with negative HBV viral load (PCR), negative Hepatitis B e antigen and antibody and negative hepatitis B core antibody, I am suspicious that is is a false positive test. I will plan to repeat the hepatitis B surface antigen. I will determine the next steps depending on the result of the repeat hepatitis B surface antigen.                 3. I will plan to repeat the abdominal US since I am not entirely sure if the hepatomegaly seen on US from 7/8 was a transient process in the setting of bacteremia or if this is a separate process. If abnormality persists I will refer hi to hepatology/GI team.      4. I will determine the need for follow up depending on results from the repeat hepatitis B surface antigen and US abdomen       Recommendations discussed with the patient    Sandra Horne MD  Date of Service: 08/11/22    I spent a total of 45 min with chart review, patient visit, documentation and coordination of care.          History of Present Illness:   Mr. Parker Acevedo51 yo M with a h/o prior bariatric surgery on chronic TPN c/b short-gut syndrome and frequent hospitalizations for recurrent CLABSI who ws hospitalized from 7/7/22-7/13/22, presenting with  with fevers and concern for a new line (R Cm) infection. Blood cultures were repeated on arrival to ED. Patient arrived incoherent and disoriented though afebrile and hemodynamically stable. No leukocytosis, CRP elevated. He was started on empiric antibiotics, including vancomycin and  meropenem. Blood cultures from 7/5 and 7/7 polymicrobial; with  Enterobacter (sensitive to cefepime), Lactococcus lactis (sensitive to penicillin and vancomycin) and Bacillus cereus sensitive to vancomycin. Source of bacteremia likely new CLABSI so the Cm catheter was removed on 7/8/22. Blood cultures became negative on 7/8. He is afebrile since 7/9. Meropenem was narrowed to cefepime on 7/8/22 (covering the Enterobacter) and he continued to be on vancomycin (covering the Bacillus cereus and Lactococcus). TTE was negative for vegetations. Inpatient ID team signed off on 7/11/22 recommending 14 days of treatment with cefepime and vancomycin, however OPAT/Home infusion team contacted us on 7/11/22 and they asked if there is an alternative to cefepime with lower number of infusions to facilitate the administration when patient is dismissed. Cefepime was then replaced with ertapenem. Vancomycin will need to be continued and the plan was to complete 14 days of IV antibiotics (end date 7/22/22). On 7/16/22, I  received a call from home infusion team informing me that the patient was not tolerating ertapenem and he stated he does not want that antibiotic anymore. I then recommended to stop ertapanem and start cefepime (continueing vancomycin) with the same end date on 7/22/22. Patient completed treatment on that date.     On additional review of patient's labs, I noted that he had a hepatitis B surface antigen positive from 7/8/22. The HCV serology from the same date was negative.  The hepatitis B surface antibody was negative. For completeness, I placed an add on order for HBV viral load (PCR) as well as Hepatitis B e antigen and antibody and hepatitis B core antibody - they all came back negative. For completeness I also added HIV serology which was also negative. The initial hepatitis B test was obtained because his LFTs were abnormal on his admission. They are now normalized. Also an abdominal US was performed on  "7/8/22 and showed: \"Hepatomegaly measuring up to 23.3 cm and borderline splenomegaly measuring 13 cm. 2.  Nonspecific increased hepatic periportal echogenicity which may be seen with congestive or inflammatory etiology. 3. Persistent prominent common bile duct measuring up to 9 mm likely represent postcholecystectomy reservoir effect. 4. Patent hepatic vasculature by Doppler evaluation. 5. Prominent appearing IVC, may represent congestive changes\". Patient does not have a previous Us abdomen and the image above was compared with the Ct abdomen/pelvis from Feb 2019.    Today's visit:    Patient feels overall well and denies fever, chills or night sweats. He denies new symptoms. The visit was converted to telephone visit by our facilitator likely due to technical difficulties.               Review of Systems:     CONSTITUTIONAL:  No fevers or chills  INTEGUMENTARY/SKIN: NEGATIVE for worrisome rashes, moles or lesions  EYES: Negative for icterus, vision changes or irritation  ENT/MOUTH:  Negative for oral lesions and sore throat  RESPIRATORY:  Negative for cough and dyspnea  CARDIOVASCULAR:  Negative for chest pain, palpitations and  shortness of breath  GASTROINTESTINAL:  Negative for abdominal pain, nausea, vomiting, diarrhea and constipation  GENITOURINARY:  Negative for dysuria, hematuria, frequency and urgency  MUSCULOSKELETAL: Negative for joint pain, swelling, motion restriction, negative for musculoskeletal pain  NEURO:  Negative for headache, altered mental status, numbness or weakness  PSYCHIATRIC: Negative for changes in mood or affect  HEMATOLOGIC/LYMPHATIC: negative for lymphadenopathy or bleeding  ALLERGIC/IMMUNOLOGIC: Negative for allergic reaction   ENDOCRINE: Negative for temperature intolerance, skin/hair changes         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)     " mild EF ~45% on rest 2/13/17, improves with stressing     Chronic abdominal pain      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     Complication of anesthesia      Difficulty swallowing      Gastric ulcer, unspecified as acute or chronic, without mention of hemorrhage, perforation, or obstruction      Gastro-oesophageal reflux disease      Head injury      Hiatal hernia      Other bladder disorder      Other chronic pain      PONV (postoperative nausea and vomiting)      Severe malnutrition (H)     TPN     Short gut syndrome      Tobacco abuse             Allergies:         Allergies   Allergen Reactions     Bactrim [Sulfamethoxazole W/Trimethoprim] Rash     Penicillins Anaphylaxis     Please see Antimicrobial Management Team allergy assessment note 10/10/2018. Patient reported tolerating amoxicillin.     Ertapenem Nausea and Vomiting     Doxycycline Rash     Vancomycin Rash     Rash after receiving vancomycin 3/28/16 (infusion reaction?). Tolerated with slower infusion and diphenhydramine premed.             Family History:     Family History   Problem Relation Age of Onset     Gastrointestinal Disease Mother         Crohns disease     Anxiety Disorder Mother      Thyroid Disease Mother         Grave's disease     Cancer Father         ear cancer-skin cancer/melanoma     Breast Cancer Maternal Grandmother      Macular Degeneration Maternal Grandfather      Anxiety Disorder Sister      Diabetes Maternal Uncle      Breast Cancer Other      Hypertension No family hx of      Hyperlipidemia No family hx of      Cerebrovascular Disease No family hx of      Prostate Cancer No family hx of      Depression No family hx of      Anesthesia Reaction No family hx of      Asthma No family hx of      Osteoporosis No family hx of      Genetic Disorder No family hx of      Obesity No family hx of      Mental Illness No family hx of      Substance Abuse No family hx of      Glaucoma No family hx of               Social History:     Social History     Socioeconomic History     Marital status:      Spouse name: Rose     Number of children: 1     Years of education: Not on file     Highest education level: GED or equivalent   Occupational History     Not on file   Tobacco Use     Smoking status: Light Tobacco Smoker     Packs/day: 0.10     Years: 3.00     Pack years: 0.30     Types: Cigarettes     Smokeless tobacco: Never Used     Tobacco comment: 2/4/2021    smokes 3 cigarettes/day   Vaping Use     Vaping Use: Never used   Substance and Sexual Activity     Alcohol use: No     Comment: quit 2002     Drug use: No     Sexual activity: Yes     Partners: Female     Birth control/protection: None     Comment: no protection   Other Topics Concern     Parent/sibling w/ CABG, MI or angioplasty before 65F 55M? No   Social History Narrative     Not on file     Social Determinants of Health     Financial Resource Strain: Medium Risk     Difficulty of Paying Living Expenses: Somewhat hard   Food Insecurity: No Food Insecurity     Worried About Running Out of Food in the Last Year: Never true     Ran Out of Food in the Last Year: Never true   Transportation Needs: No Transportation Needs     Lack of Transportation (Medical): No     Lack of Transportation (Non-Medical): No   Physical Activity: Not on file   Stress: Not on file   Social Connections: Not on file   Intimate Partner Violence: Not on file   Housing Stability: Not on file        Parker is a 51 year old who is being evaluated via a billable telephone visit.      What phone number would you like to be contacted at? [phone]  How would you like to obtain your AVS? Chris    Telephone visit duration: 20 min    I spent a total of 45 min with chart review, patient visit, documentation and coordination of care.

## 2022-08-10 NOTE — PROGRESS NOTES
Parker is a 51 year old who is being evaluated via a billable telephone visit.      What phone number would you like to be contacted at? [phone]  How would you like to obtain your AVS? Chris    Telephone visit duration: 20 min      I spent a total of 45 min with chart review, patient visit, documentation and coordination of care.

## 2022-08-10 NOTE — PRE-PROCEDURE
GENERAL PRE-PROCEDURE:   Procedure:  IR left tunneled CVC check with or without revision under imaging guidance  Date/Time:  8/10/2022 2:03 PM    Verbal consent obtained?: Yes    Written consent obtained?: Yes    Risks and benefits: Risks, benefits and alternatives were discussed    DC Plan: Appropriate discharge home plan in place for patients who are going home after procedure   Consent given by:  Patient  Patient states understanding of procedure being performed: Yes    Patient's understanding of procedure matches consent: Yes    Procedure consent matches procedure scheduled: Yes    Expected level of sedation:  Moderate  Appropriately NPO:  Yes  ASA Class:  3  Mallampati  :  Grade 1- soft palate, uvula, tonsillar pillars, and posterior pharyngeal wall visible  Heart:  Normal heart sounds and rate  History & Physical reviewed:  History and physical reviewed and no updates needed  Statement of review:  I have reviewed the lab findings, diagnostic data, medications, and the plan for sedation

## 2022-08-10 NOTE — PROGRESS NOTES
S/p left chest tunneled line replacement. Left chest site intact. VSS. Pt alert and oriented. Pt is stating he wants to leave and is ready to go home. Has bedrest ordered x 1 hour. Notified Dr. Walker. Will attempt to have pt stay the full hour (1750)

## 2022-08-10 NOTE — IR NOTE
Patient Name: Parker Acevedo  Medical Record Number: [id number]  Today's Date: 8/10/2022    Procedure: tunneled CVC replacement  Proceduralist: Poly Colindres and Aaron    Procedure Start: 1600  Procedure end: 1645  Sedation medications administered: versed 2 Mg., fentanyl  100 mcg.    Report given to: 2A RN    Other Notes: Pt arrived to IR room 1 from . Consent reviewed. Pt denies any questions or concerns regarding procedure. Pt positioned supine  and monitored per protocol. Pt tolerated procedure without any noted complications. Pt transferred back to . CVC flushed with heparin, ready to use.

## 2022-08-10 NOTE — DISCHARGE INSTRUCTIONS
Interventional Radiology                                                                   Discharge Instructions                                                                Following Tunneled Catheter Placement      Your site(s) has been closed with:     The Catheter is sutured  to skin at  insertion site    Derma Peña (Skin Glue) on neck incision  Do not apply any ointments over site  This thin layer will slough off in 7-10 days               May gently remove Derma Peña in 10 days if still present         Tunneled catheter is always covered with a Sterile Transparent dressing  Keep site clean and dry   Cover with an occlusive dressing for showering  Weekly transparent dressing changes or when it becomes wet or dirty     If there is any oozing or bleeding from the site, apply direct pressure for 5-10 minutes with a gauze pad.  If bleeding continues after 10 minutes call your primary doctor.  If bleeding cannot be controlled with direct pressure, call 911.    Call your Doctor if:  Bleeding as above  Swelling in your neck or arm  Sudden onset of shortness of breath, lightheadedness, or heart palpitations..  Fever greater than 100.5  F  Other signs of infection such as, redness, tenderness, or drainage from the wound.    If you were given sedation:  We recommend an adult stay with you for the first 24 hours.  No driving or alcoholic beverages for 24 hours.    ADDITIONAL INSTRUCTIONS:           No heavy lifting greater than 10 lbs for 3 days.           May use ice pack for pain or minor swelling for 15 min 3-4 times per day.    [facility] Interventional Radiology Department    Physician:  Dr. Colindres and Dr. Walker       Date:August 10, 2022  Telephone Numbers:  [phone]      Monday-Friday 7:30 am to 4:00 pm  Hospital : [phone]....After hours, Weekends, & Holidays.  Ask for the Interventional Radiologist on call.  Someone is on call 24  hours a day.   Emergency Department:  [phone]

## 2022-08-10 NOTE — PROGRESS NOTES
2A prep for Left chest tunneled line check. Pt alert, oriented and aware of planned procedure. VSS. Appropriately NPO. Lovenox at 0900 am. Left PIV placed. No labs ordered.

## 2022-08-11 ENCOUNTER — PATIENT OUTREACH (OUTPATIENT)
Dept: CARE COORDINATION | Facility: CLINIC | Age: 52
End: 2022-08-11

## 2022-08-11 ENCOUNTER — HOME INFUSION (PRE-WILLOW HOME INFUSION) (OUTPATIENT)
Dept: PHARMACY | Facility: CLINIC | Age: 52
End: 2022-08-11

## 2022-08-11 ENCOUNTER — TELEPHONE (OUTPATIENT)
Dept: INFECTIOUS DISEASES | Facility: CLINIC | Age: 52
End: 2022-08-11

## 2022-08-11 ASSESSMENT — ACTIVITIES OF DAILY LIVING (ADL): DEPENDENT_IADLS:: TRANSPORTATION

## 2022-08-11 NOTE — TELEPHONE ENCOUNTER
----- Message from Sandra Horne MD sent at 8/11/2022 11:10 AM CDT -----  Dear all,    Can you please schedule the blood labs (Hep B surface antigen) and the US of abdomen I ordered for this patient? It can be scheduled for sometime within the next 2-3 weeks.    Thank you    Sandra

## 2022-08-11 NOTE — PROGRESS NOTES
Clinic Care Coordination Contact  [facility]/Voicemail    Referral Source: IP Handoff  Clinical Data: Care Coordinator Outreach  Outreach attempted x 1.  Left message on patient's voicemail with call back information and requested return call.  Plan: Care Coordinator will try to reach patient again in 10 business days.    Kinsey Muhammad RN Care Coordination   [facility]  Email: [email or web address]  Phone: [phone]

## 2022-08-12 ENCOUNTER — TELEPHONE (OUTPATIENT)
Dept: INFECTIOUS DISEASES | Facility: CLINIC | Age: 52
End: 2022-08-12

## 2022-08-14 ENCOUNTER — MYC MEDICAL ADVICE (OUTPATIENT)
Dept: INTERNAL MEDICINE | Facility: CLINIC | Age: 52
End: 2022-08-14

## 2022-08-15 ENCOUNTER — TELEPHONE (OUTPATIENT)
Dept: INTERNAL MEDICINE | Facility: CLINIC | Age: 52
End: 2022-08-15

## 2022-08-15 NOTE — PLAN OF CARE
"[id number]    /61 (BP Location: Right arm)   Pulse 73   Temp 96.9  F (36.1  C) (Oral)   Resp 20   Ht 1.803 m (5' 11\")   Wt 87.6 kg (193 lb 1.6 oz)   SpO2 96%   BMI 26.93 kg/m      VSS. Afebrile. LS clear, on room air. Chronic abdominal pain, Oxycodone given x1. New Fentanyl patch on his right upper shoulder. Patient independently manages his GJ tube, will open to gravity as needed. TPN restarted later per request, patient states he tends to start, and stop it based on how he feels. Nausea intermittent. UpAdLib.     [id number]    Patient felt nauseated, requested TPN to be stopped, GI cocktail given. Benadryl provided 30 minutes prior to Vanco infusion over 2 hours (pt requested a longer infusion time). Unfortunately, Vanco was started before trough could be obtained. Spoke with Pharmacist, and was given the OK to have the trough drawn peripherally since it was only infusing for 20 minutes. TPN restarted, Zofran given, now tolerating. Oxy given around mid-shift. Patient is upadlib, awake for most of the night. Will continue to monitor.       Per Mobility Level Guideline;  Bed/chair alarm No.  Patient may ambulate independently  Patient requires the following assistive equipment: N/A  PT/OT consult orders in place No    " Iud removal    Date/Time: 8/15/2022 10:50 AM  Performed by: Norman Chance DO  Authorized by: [address], DO   [place] Protocol:  Consent: Verbal consent obtained  Risks and benefits: risks, benefits and alternatives were discussed  Consent given by: patient  Time out: Immediately prior to procedure a "time out" was called to verify the correct patient, procedure, equipment, support staff and site/side marked as required  Patient understanding: patient states understanding of the procedure being performed  Patient identity confirmed: verbally with patient      Procedure:     Removed with no complications: yes    Comments:      Patient is interested in trying to conceive  She is taking a prenatal vitamin  She had a vaginal delivery in 2019, and was diagnosed with pre-eclampsia at that time  We discussed ASA 81mg, two tablets daily starting around 12 weeks gestational age  Patient has had some bleeding with IUD  Discussed ovulation may occur within two weeks, may take 2-3 cycles for her period to regulate  First period may seem very heavy  Discussed dating US done in office, followed by OB intake visit with RN  Reviewed practice providers with patient  All questions answered

## 2022-08-15 NOTE — TELEPHONE ENCOUNTER
Please see several [employer] encounters/messages and attached photos from 08/14/22.    Would you like patient to set up an appointment?  If so, please advise on date/time.

## 2022-08-16 ENCOUNTER — LAB REQUISITION (OUTPATIENT)
Dept: LAB | Facility: CLINIC | Age: 52
End: 2022-08-16
Payer: COMMERCIAL

## 2022-08-16 ENCOUNTER — TELEPHONE (OUTPATIENT)
Dept: INTERVENTIONAL RADIOLOGY/VASCULAR | Facility: CLINIC | Age: 52
End: 2022-08-16

## 2022-08-16 DIAGNOSIS — R13.10 DYSPHAGIA, UNSPECIFIED: ICD-10-CM

## 2022-08-16 LAB
ALBUMIN SERPL-MCNC: 3 G/DL (ref 3.4–5)
ALP SERPL-CCNC: 68 U/L (ref 40–150)
ALT SERPL W P-5'-P-CCNC: 25 U/L (ref 0–70)
ANION GAP SERPL CALCULATED.3IONS-SCNC: 3 MMOL/L (ref 3–14)
AST SERPL W P-5'-P-CCNC: 13 U/L (ref 0–45)
BASOPHILS # BLD AUTO: 0 10E3/UL (ref 0–0.2)
BASOPHILS NFR BLD AUTO: 1 %
BILIRUB DIRECT SERPL-MCNC: <0.1 MG/DL (ref 0–0.2)
BILIRUB SERPL-MCNC: 0.4 MG/DL (ref 0.2–1.3)
BUN SERPL-MCNC: 18 MG/DL (ref 7–30)
CALCIUM SERPL-MCNC: 8.2 MG/DL (ref 8.5–10.1)
CHLORIDE BLD-SCNC: 109 MMOL/L (ref 94–109)
CO2 SERPL-SCNC: 31 MMOL/L (ref 20–32)
CREAT SERPL-MCNC: 0.63 MG/DL (ref 0.66–1.25)
EOSINOPHIL # BLD AUTO: 0.2 10E3/UL (ref 0–0.7)
EOSINOPHIL NFR BLD AUTO: 4 %
ERYTHROCYTE [DISTWIDTH] IN BLOOD BY AUTOMATED COUNT: 14.3 % (ref 10–15)
FASTING STATUS PATIENT QL REPORTED: YES
GFR SERPL CREATININE-BSD FRML MDRD: >90 ML/MIN/1.73M2
GLUCOSE BLD-MCNC: 79 MG/DL (ref 70–99)
HCT VFR BLD AUTO: 30.7 % (ref 40–53)
HGB BLD-MCNC: 9.9 G/DL (ref 13.3–17.7)
HOLD SPECIMEN: NORMAL
HOLD SPECIMEN: NORMAL
IMM GRANULOCYTES # BLD: 0 10E3/UL
IMM GRANULOCYTES NFR BLD: 0 %
LYMPHOCYTES # BLD AUTO: 1.5 10E3/UL (ref 0.8–5.3)
LYMPHOCYTES NFR BLD AUTO: 28 %
MAGNESIUM SERPL-MCNC: 2.1 MG/DL (ref 1.6–2.3)
MCH RBC QN AUTO: 31.5 PG (ref 26.5–33)
MCHC RBC AUTO-ENTMCNC: 32.2 G/DL (ref 31.5–36.5)
MCV RBC AUTO: 98 FL (ref 78–100)
MONOCYTES # BLD AUTO: 0.7 10E3/UL (ref 0–1.3)
MONOCYTES NFR BLD AUTO: 13 %
NEUTROPHILS # BLD AUTO: 2.9 10E3/UL (ref 1.6–8.3)
NEUTROPHILS NFR BLD AUTO: 54 %
NRBC # BLD AUTO: 0 10E3/UL
NRBC BLD AUTO-RTO: 0 /100
PHOSPHATE SERPL-MCNC: 3.7 MG/DL (ref 2.5–4.5)
PLATELET # BLD AUTO: 183 10E3/UL (ref 150–450)
POTASSIUM BLD-SCNC: 3.7 MMOL/L (ref 3.4–5.3)
PROT SERPL-MCNC: 6.2 G/DL (ref 6.8–8.8)
RBC # BLD AUTO: 3.14 10E6/UL (ref 4.4–5.9)
SODIUM SERPL-SCNC: 143 MMOL/L (ref 133–144)
TRIGL SERPL-MCNC: 61 MG/DL
WBC # BLD AUTO: 5.3 10E3/UL (ref 4–11)

## 2022-08-16 PROCEDURE — 82248 BILIRUBIN DIRECT: CPT | Performed by: SURGERY

## 2022-08-16 PROCEDURE — 84100 ASSAY OF PHOSPHORUS: CPT | Performed by: SURGERY

## 2022-08-16 PROCEDURE — 84478 ASSAY OF TRIGLYCERIDES: CPT | Performed by: SURGERY

## 2022-08-16 PROCEDURE — 80053 COMPREHEN METABOLIC PANEL: CPT | Performed by: SURGERY

## 2022-08-16 PROCEDURE — 36592 COLLECT BLOOD FROM PICC: CPT | Performed by: SURGERY

## 2022-08-16 PROCEDURE — 85025 COMPLETE CBC W/AUTO DIFF WBC: CPT | Performed by: SURGERY

## 2022-08-16 PROCEDURE — 83735 ASSAY OF MAGNESIUM: CPT | Performed by: SURGERY

## 2022-08-16 NOTE — TELEPHONE ENCOUNTER
Spoke with: Wife (mary jo)  Call attempt: 1  Message left: No  Any fever: No  Any redness/swelling/ abnormal drainage around puncture site: No  Were you instructed well enough to take care of yourself at home: Yes  Are you satisfied with the care you received: Yes  Any additional concerns or questions: No    Post call completed.   August 16, 2022 1:37 PM  Myesha Thornton RN

## 2022-08-17 ENCOUNTER — HOME INFUSION (PRE-WILLOW HOME INFUSION) (OUTPATIENT)
Dept: PHARMACY | Facility: CLINIC | Age: 52
End: 2022-08-17

## 2022-08-18 ENCOUNTER — DOCUMENTATION ONLY (OUTPATIENT)
Dept: EMERGENCY MEDICINE | Facility: CLINIC | Age: 52
End: 2022-08-18

## 2022-08-18 DIAGNOSIS — S83.8X2A SPRAIN OF OTHER SPECIFIED PARTS OF LEFT KNEE, INITIAL ENCOUNTER: Primary | ICD-10-CM

## 2022-08-18 NOTE — PROGRESS NOTES
This is a recent snapshot of the patient's [facility] medical record.  For current drug dose and complete information and questions, call [phone] or In Basket pool, fv home infusion ([id number])  CSN Number:  [id number]

## 2022-08-19 NOTE — PROGRESS NOTES
This is a recent snapshot of the patient's [facility] medical record.  For current drug dose and complete information and questions, call [phone] or In [facility] pool, fv home infusion ([id number])  CSN Number:  [id number]

## 2022-08-22 ENCOUNTER — TELEPHONE (OUTPATIENT)
Dept: INFECTIOUS DISEASES | Facility: CLINIC | Age: 52
End: 2022-08-22

## 2022-08-22 ENCOUNTER — MYC MEDICAL ADVICE (OUTPATIENT)
Dept: INTERNAL MEDICINE | Facility: CLINIC | Age: 52
End: 2022-08-22

## 2022-08-22 ENCOUNTER — HOME INFUSION (PRE-WILLOW HOME INFUSION) (OUTPATIENT)
Dept: PHARMACY | Facility: CLINIC | Age: 52
End: 2022-08-22

## 2022-08-22 DIAGNOSIS — Z98.84 S/P BARIATRIC SURGERY: ICD-10-CM

## 2022-08-22 DIAGNOSIS — I82.90 VTE (VENOUS THROMBOEMBOLISM): ICD-10-CM

## 2022-08-22 DIAGNOSIS — F90.0 ADHD (ATTENTION DEFICIT HYPERACTIVITY DISORDER), INATTENTIVE TYPE: ICD-10-CM

## 2022-08-22 DIAGNOSIS — R11.0 NAUSEA: ICD-10-CM

## 2022-08-22 RX ORDER — DEXTROAMPHETAMINE SACCHARATE, AMPHETAMINE ASPARTATE, DEXTROAMPHETAMINE SULFATE AND AMPHETAMINE SULFATE 5; 5; 5; 5 MG/1; MG/1; MG/1; MG/1
TABLET ORAL
Qty: 30 TABLET | Refills: 0 | Status: SHIPPED | OUTPATIENT
Start: 2022-08-22 | End: 2022-09-26

## 2022-08-22 RX ORDER — PANTOPRAZOLE SODIUM 40 MG/1
40 TABLET, DELAYED RELEASE ORAL DAILY
Qty: 30 TABLET | Refills: 5 | Status: ON HOLD | OUTPATIENT
Start: 2022-08-22 | End: 2023-08-06

## 2022-08-22 RX ORDER — LORAZEPAM 1 MG/1
TABLET ORAL
Qty: 30 TABLET | Refills: 0 | Status: SHIPPED | OUTPATIENT
Start: 2022-08-22 | End: 2022-09-26

## 2022-08-22 RX ORDER — PANTOPRAZOLE SODIUM 40 MG/1
40 TABLET, DELAYED RELEASE ORAL DAILY
Qty: 30 TABLET | Refills: 5 | OUTPATIENT
Start: 2022-08-22

## 2022-08-22 NOTE — TELEPHONE ENCOUNTER
"Requested Prescriptions   Pending Prescriptions Disp Refills    LORazepam (ATIVAN) 1 MG tablet [Pharmacy Med Name: LORAZEPAM 1MG TABS] 30 tablet 0     Sig: TAKE 1 TABLET BY MOUTH ONCE A DAY AS NEEDED FOR ANXIETY WITH TPN AND MEDICATIONS . 30 TO LAST 30 DAYS        There is no refill protocol information for this order        amphetamine-dextroamphetamine (ADDERALL) 20 MG tablet 30 tablet 0     Sig: TAKE ONE TABLET BY MOUTH ONCE DAILY        There is no refill protocol information for this order       Refused Prescriptions Disp Refills    pantoprazole (PROTONIX) 40 MG EC tablet [Pharmacy Med Name: PANTOPRAZOLE SODIUM 40MG TBEC] 30 tablet 5     Sig: TAKE 1 TABLET (40 MG) BY MOUTH DAILY        PPI Protocol Passed - 8/22/2022  1:36 PM        Passed - Not on Clopidogrel (unless Pantoprazole ordered)        Passed - No diagnosis of osteoporosis on record        Passed - Recent (12 mo) or future (30 days) visit within the authorizing provider's specialty     Patient has had an office visit with the authorizing provider or a provider within the authorizing providers department within the previous 12 mos or has a future within next 30 days. See \"Patient Info\" tab in inbasket, or \"Choose Columns\" in Meds & Orders section of the refill encounter.              Passed - Medication is active on med list        Passed - Patient is age 18 or older               Aparna Sr RN  "

## 2022-08-22 NOTE — TELEPHONE ENCOUNTER
Rx just  on 2022 so needs refills here  Sharon Fair, Pharmacy [facility], [facility] [place] [phone]

## 2022-08-23 ENCOUNTER — VIRTUAL VISIT (OUTPATIENT)
Dept: PALLIATIVE MEDICINE | Facility: CLINIC | Age: 52
End: 2022-08-23
Payer: COMMERCIAL

## 2022-08-23 ENCOUNTER — PATIENT OUTREACH (OUTPATIENT)
Dept: CARE COORDINATION | Facility: CLINIC | Age: 52
End: 2022-08-23

## 2022-08-23 DIAGNOSIS — R10.9 CHRONIC ABDOMINAL PAIN: ICD-10-CM

## 2022-08-23 DIAGNOSIS — G89.4 CHRONIC PAIN SYNDROME: ICD-10-CM

## 2022-08-23 DIAGNOSIS — G89.29 CHRONIC ABDOMINAL PAIN: ICD-10-CM

## 2022-08-23 PROCEDURE — 99213 OFFICE O/P EST LOW 20 MIN: CPT | Mod: 95 | Performed by: NURSE PRACTITIONER

## 2022-08-23 RX ORDER — OXYCODONE HCL 5 MG/5 ML
5-10 SOLUTION, ORAL ORAL 4 TIMES DAILY PRN
Qty: 1200 ML | Refills: 0 | Status: SHIPPED | OUTPATIENT
Start: 2022-08-23 | End: 2022-09-26

## 2022-08-23 RX ORDER — FENTANYL 25 UG/1
1 PATCH TRANSDERMAL
Qty: 15 PATCH | Refills: 0 | Status: SHIPPED | OUTPATIENT
Start: 2022-08-23 | End: 2022-09-23

## 2022-08-23 ASSESSMENT — PAIN SCALES - GENERAL: PAINLEVEL: MODERATE PAIN (4)

## 2022-08-23 NOTE — PROGRESS NOTES
Clinic Care Coordination Contact  Care Team Conversations    Rose, patients wife called to report that [facility] home care nurse was out today to change patients bandage, and mentioned that she was in contact with a covid positive patient therefore was wearing a mask per [employer] policy while visiting other patients. There is no other information on nurses exposure. Wife feels the nurse shouldn't be caring for Parker based on this information.   She is comfortable to do watchful waiting and report any questions or concerns to care team.   Otherwise, wants Dr. Isaac to be aware of this second hand exposure today.   She has a call out to [facility] to talk with management about their policy and to express her concerns.   Lastly, CC RN provided patient with resources such as the [facility] COVID-19 Public Hotline: [phone] Mon.-Fri.: 9 a.m. to 7 p.m. for any further questions.     Thank you,   Jami Blank RN, BSN, PHN Care Coordinator  [place], [place], and [place]   Phone: [phone]

## 2022-08-23 NOTE — PATIENT INSTRUCTIONS
Plan:   Physical Therapy: none  Clinical Health Psychologist to address issues of relaxation, behavioral change, coping style, and other factors important to improvement: none  Continue gentle movement at home  Diagnostic Studies: none  Medication Management:   Continue fentanyl patch 25mcg/hr every 48 hours, fill 9/2 and start 9/6, early fill due to out of town travel  Oxycodone liquid 5mg/5ml,  use 5-10mg (5-10mls) every 4 hours as needed, max of 40mg (40ml) per day. Fill 9/2 and start 9/6. Early fill due to out of town travel  Further procedures recommended: nne  Acupuncture: I am fine with this  Urine toxicology screen today: none  Recommendations/follow-up for PCP:  See above  Release of information: none  Follow up: 10-12 weeks for in-person visit. Please call [phone] to make your follow-up appointment with me.     ----------------------------------------------------------------  Clinic Number:  [phone]   Call with any questions about your care and for scheduling assistance.   Calls are returned Monday through Friday between 8 AM and 4:30 PM. We usually get back to you within 2 business days depending on the issue/request.    If we are prescribing your medications:  For opioid medication refills, call the clinic or send a [employer] message 7 days in advance.  Please include:  Name of requested medication  Name of the pharmacy.  For non-opioid medications, call your pharmacy directly to request a refill. Please allow 3-4 days to be processed.   Per [place] State Law:  All controlled substance prescriptions must be filled within 30 days of being written.    For those controlled substances allowing refills, pickup must occur within 30 days of last fill.      We believe regular attendance is key to your success in our program!    Any time you are unable to keep your appointment we ask that you call us at least 24 hours in advance to cancel.This will allow us to offer the appointment time to another patient.    Multiple missed appointments may lead to dismissal from the clinic.

## 2022-08-23 NOTE — PROGRESS NOTES
Is Pt currently in [place]? Yes    NOTE:  If Pt is not in [place], Appointment needs to be canceled and rescheduled.  [phone]    Parker is a 51 year old who is being evaluated via a billable telephone visit.      What phone number would you like to be contacted at? [phone]  How would you like to obtain your AVS? Chris Calvert CMA ([facility]    8/23/2022    Chief complaint:   In ER recently for left knee after a slip and fall, in immobilizer  Ongoing abdominal pain, will be re-checked for Hep B by infectious disease      Interval history:  Parker Acevedo is a 51 year old male is known to me for:  Visceral hyperalgesia  Chronic abdominal pain  Chronic pain syndrome  PMHx includes: ADHD, anxiety, cardiomyopathy and nutritional diseases, chronic abdominal pain, central line associated bloodstream infection recurrent, anesthesia complication, difficulty swallowing, gastric ulcer unspecified as acute or chronic without mention of hemorrhage perforation or obstruction, gastroesophageal reflux disease, head injury, hiatal hernia, other bladder disorder, other chronic pain, postop nausea and vomiting, severe malnutrition on TPN, short gut syndrome, tobacco abuse  PSHx includes: Appendectomy, back surgery (11/3/2014), biopsy of cervical lymph node (2/20/2015), cholecystectomy, colonoscopy (2021, 2022), cervical anterior 1 level discectomy and fusion (2/15/2017), endoscopic insertion tube gastrostomy (9/9/2013), endoscopic ultrasound upper gastrointestinal tract (4/29/2011), endoscopic ultrasound upper gastrointestinal tract (9/9/2013), endoscopic ultrasound upper gastrointestinal tract (2/24/2021), endoscopy (3/25/2011, 8/4/2009, 1/5/2009), multiple EGDs, gastrectomy (6/22/2012), gastrojejunostomy (8/26/2009), umbilical hernia repair (2006), hernia raphe hiatal (6/22/2012), herniorrhaphy inguinal (11/22/2013), insert PICC line on the right (12/19/2019),  insert PICC line right (2/21/2020), insert vascular access port on the right (12/19/2017, 8/2/2018), multiple interventional radiology CVC tunnel placement and removals, exploratory laparotomy (11/22/2013), orthopedic surgery, Celestino-en-Y gastric bypass (2003), tonsillectomy.        Recommendations/plan at the last visit on 5/27/2022 included:  1. Physical Therapy: none  2. Clinical Health Psychologist to address issues of relaxation, behavioral change, coping style, and other factors important to improvement: none  3. Continue gentle movement at home  4. Diagnostic Studies: none  5. Medication Management:   1. Continue fentanyl patch 25mcg/hr every 48 hours, fill 5/27 and start 6/1  2. Starting on June 1st, you may use 5-10mg (5-10mls) every 4 hours as needed, max of 40mg (40ml) per day  6. Further procedures recommended: nne  7. Acupuncture: I am fine with this  8. Urine toxicology screen today: none  9. Signed CSA with me   10. Recommendations/follow-up for PCP:  See above  11. Release of information: none  12. Follow up: 10-12 weeks. This can be in-person or video, your choice. Please call [phone] to make your follow-up appointment with me.       Since his last visit, Parker Acevedo reports:    Interval history August 23, 2022  -abdominal pain, pretty bad today, ID will recheck him for Hep B and are doing some updated imaging.   -insurance adjusters at his home now for floyd issues  -left knee pain after slip and fall, seen in ER on 8/9/2022 and in immobilizer, will follow up with ortho.       Pain history collected at initial visit on 5/27/2022  He had gastric bypass in 2003 and about 5 years after that, he developed gastric ulcers. He ended up having surgery for gastric ulcer and hernias and such. He has had over 11 surgeries for his abdomen. He is malnourished due to short-gut syndrome. He has a J-tube that is open to vent bile from his stomach as he gets bloated.   Worst pain is inside the abdominal  "cavity. He will have pain so bad that he states he screams for his mother. He feels that this is a deep inside pain.   -he would like to increase his oxycodone dose by one dose per day. His activity is limited by not having medication to cover him for his daily activities.      -he has a DVT in his right arm and in his right jugular vein. He is on lovenox.         At this point, the patient's participation with our multidisciplinary team includes:  The patient has been compliant with the program.  PT - none  Health Psych - none      Pain scores:  Pain intensity on average is 4-5 on a scale of 0-10.    Range is 2-10+/10. He will get to a 10+ about 6 to 7 times per week. He thinks this may be about eating stuff he probably shouldn't per patient report.   Pain right now is 4-5/10.   Pain is described as \"sharp, burning, agonizing and like on fire or like pirahnas gnawing me inside out.\"    Pain is constant in nature    Current pain relevant medications:   -tylenol 32mg/ml liquid take 15.65mls (500mg) Q 4 hours PRN fever/mild pain  (somewhat helpful for headaches)  --Duragesic 25mcg/hr Q 48 hours (helpful)  -lidoderm 5% patch PRN ()  -Narcan nasal spray for opiate reversal   -Oxycodone 5mg/5ml solution take 5-10mls (5-10mg) TID PRN max of 30mg/day with 4 hours between doses.      Other pertinent medications:  -Adderall 20mg every day  -LOVENOX 120mg Subcutaneously every day  -lorazepam 1mg for anxiety with TPN and meds once per day (uses most nights)  -Zofran 8mg Q 8 hours PRN     Previous medication treatments included:  OPIATES: Fentanyl (helpful), morphine (hallucinations), Dilaudid (not helpful, did not dissolve), Tylenol #3 (not helpful), hydrocodone (not helpful), Belbuca (not helpful)  NSAIDS: cannot take due to severe gastric ulcer disease  MUSCLE RELAXANTS: none  ANTI-MIGRAINE MEDS: none  ANTI-DEPRESSANTS: none  SLEEP AIDS: benadryl (helpful)  ANTI-CONVULSANTS: gabapentin (bad headaches and acid " reflux),  TOPICALS: Lidocaine patches (helpful)  ANXIOLYTICS: valium (helpful 5mg dosage), lorazepam (helpful)  MEDICAL CANNABIS: not interested  Other meds: tylenol (helpful for headaches)        Other treatments have included:  Parker Acevedo has been seen at a pain clinic in the past.  Previously managed here by Dr. Jessica Milligan. Also seen by [facility] [place] for possible implanted intrathecal pain pump, he is not candidate due to infection risk.   PT: tried, never helped his neck or abdominal surgery  Massage Therapy: helpful for a day or two  Chiropractic care: tried, helped some   Acupuncture: tried, not helpful  TENs Unit: tried, not helpful  Healing Touch: not helpful     Injections:   -previously had injections for his neck with Dr. Jessica Milligan        THE 4 A's OF OPIOID MAINTENANCE ANALGESIA    Analgesia: keeps pain essentially manageable    Activity: he walks daily and does 1/2 of the mowing, light housekeeping    Adverse effects: none    Adherence to Rx protocol: yes      Side Effects: no side effect  Patient is using the medication as prescribed: YES    Medications:  Current Outpatient Medications   Medication Sig Dispense Refill     acetaminophen (TYLENOL) 32 mg/mL liquid Take 15.65 mLs (500 mg) by mouth every 4 hours as needed for fever or mild pain 455 mL 1     albuterol (PROAIR HFA/PROVENTIL HFA/VENTOLIN HFA) 108 (90 Base) MCG/ACT inhaler Inhale 2 puffs into the lungs every 6 hours as needed 18 g 1     amphetamine-dextroamphetamine (ADDERALL) 20 MG tablet TAKE ONE TABLET BY MOUTH ONCE DAILY 30 tablet 0     carvedilol (COREG) 6.25 MG tablet Take 2 tablets (12.5 mg) by mouth 2 times daily (with meals) 60 tablet 3     cyanocobalamin (CYANOCOBALAMIN) 1000 MCG/ML injection INJECT 1 ML INTO THE MUSCLE EVERY 30 DAYS 1 mL 3     enoxaparin ANTICOAGULANT (LOVENOX) 80 MG/0.8ML syringe Inject 0.8 mLs (80 mg) Subcutaneous 2 times daily 48 mL 0     EPINEPHrine (ANY BX GENERIC EQUIV) 0.3 MG/0.3ML injection 2-pack         "[place]NA[facility]ED PATIENT Contact [facility] for patient specific medication information at [phone] on admission and discharge from the hospital.  Phones are answered 24 hours a day 7 days a week 365 days a year.    Providers - Choose \"CONTINUE HOME MED (no script)\" at discharge if patient treatment with home infusion will continue.       fentaNYL (DURAGESIC) 25 mcg/hr 72 hr patch Place 1 patch onto the skin every 48 hours remove old patch. Fill 08/05/22 and start 08/07/22. 30 days for chronic pain 15 patch 0     insulin syringe-needle U-100 (29G X 1/2\" 1 ML) 29G X 1/2\" 1 ML miscellaneous Use to inject b12 every 30 days 3 each 4     lactated ringers infusion Inject 1,000-2,000 mLs into the vein daily as needed       lidocaine (LIDODERM) 5 % patch Place 1-2 patches onto the skin every 24 hours Wear for 12 hours, remove for 12 hours.  OK to cut to better fit to size. 60 patch 6     LORazepam (ATIVAN) 1 MG tablet TAKE 1 TABLET BY MOUTH ONCE A DAY AS NEEDED FOR ANXIETY WITH TPN AND MEDICATIONS . 30 TO LAST 30 DAYS 30 tablet 0     multivitamin, therapeutic (THERA-VIT) TABS tablet Take 1 tablet by mouth daily       naloxone (NARCAN) 4 MG/0.1ML nasal spray Spray 1 spray (4 mg) into one nostril alternating nostrils once as needed for opioid reversal every 2-3 minutes until assistance arrives 0.2 mL 0     nystatin (MYCOSTATIN) 967128 UNIT/GM external cream Apply topically 2 times daily 30 g 0     ondansetron (ZOFRAN-ODT) 8 MG ODT tab DISSOLVE ONE TABLET ON TONGUE EVERY 8 HOURS AS NEEDED FOR NAUSEA 90 tablet 1     oxyCODONE (ROXICODONE) 5 MG/5ML solution Take 5-10 mLs (5-10 mg) by mouth 4 times daily as needed for pain Max of 40mg/day. Put at least 4 hours between doses. Fill 08/05/22 and start 08/07/22. 30 day supply for chronic pan 1200 mL 0     pantoprazole (PROTONIX) 40 MG EC tablet Take 1 tablet (40 mg) by mouth daily 30 tablet 5     parenteral nutrition - PTA/DISCHARGE ORDER Patient " receives 2050 mL TPN every 14 hours through PICC at [facility].       polyethylene glycol (MIRALAX) 17 GM/Dose powder Take 17 g by mouth daily 1020 g 3     sucralfate (CARAFATE) 1 GM/10ML suspension TAKE 10MLS  BY MOUTH FOUR TIMES A DAY AS NEEDED 420 mL 1     vitamin D2 (ERGOCALCIFEROL) 45596 units (1250 mcg) capsule TAKE 1 CAPSULE (50,000 UNITS) BY MOUTH ONCE A WEEK 12 capsule 3       Medical History: any changes in medical history since they were last seen? No    Social History:   Home situation:  to Vand[place], one son in late 20's   Occupation/Schooling: he used to work at [employer]. He is on disability, SSDI  Tobacco use: smokes 1/2 ppd, discussed smoking cessation today, he is not ready at present time  Alcohol use: none  Drug use: none  History of chemical dependency treatment: none    Is patient a current smoker or tobacco user?  1/2 ppd  If yes, was cessation counseling offered?  yes          Physical Exam:  Vital signs: There were no vitals taken for this visit.    Behavioral observations:  Awake, alert. Cooperative.   Pulm: respirations easy and unlabored. Able to speak in full sentences without SOB or cough noted.            Diagnostic tests:  CT ABDOMEN PELVIS W CONTRAST  2/16/2019 3:00 AM      HISTORY: Right-sided abdominal pain, status post gastric bypass.  Patient has G-tube with blood and history of ulcers.     TECHNIQUE: CT abdomen and pelvis with 85 mL Isovue-370 IV. Radiation  dose for this scan was reduced using automated exposure control,  adjustment of the mA and/or kV according to patient size, or iterative  reconstruction technique.     COMPARISON: 1/13/2015.     FINDINGS:  Abdomen: There is mild dependent atelectasis at the lung bases. The  heart size is normal. Small hiatal hernia. There is mild biliary  dilatation into the pancreas head which is probably normal post  cholecystectomy. The liver otherwise appears normal. The gallbladder  is absent. The spleen,  pancreas, adrenal glands and kidneys are normal  in appearance. There is a G-tube in place. No abdominal or pelvic  lymph node enlargement.     Pelvis: The ascending colon and transverse colon are very distended  with gas, stool and fluid. The descending and rectosigmoid colon are  nondilated. Transition point is in the region of the splenic flexure,  but there is no convincing obstruction. Small bowel is normal in  caliber. The appendix is normal. There is no free intraperitoneal gas  or fluid.                                                                      IMPRESSION:  1. The ascending and transverse colon are distended with gas, stool  and fluid. Distal colon is nondilated. No focal obstruction is  evident.  2. Small hiatal hernia.     IMANI HU MD        MR CERVICAL SPINE WITHOUT CONTRAST  1/2/2017  2:44 PM     HISTORY: Injury to neck 2 weeks ago with development of suspected  radicular pain to the left upper extremity with weakness of thumb and  index finger.     COMPARISON: Plain films 12/22/2016.     TECHNIQUE: Routine MR cervical spine extended through T2.     FINDINGS: Vertebral body bone marrow signal is normal and the  alignment is normal through T2. No malignant or destructive changes.  The cervical and upper thoracic spinal cord appear intrinsically  normal through T2. Craniocervical junction region is normal. No  paraspinous soft tissue abnormality.     Findings by level as follows:     C2-C3: Negative. No disc protrusion. No central or lateral stenosis.     C3-C4: Negative.     C4-C5: Very tiny central disc protrusion. No significant central or  lateral stenosis.     C5-C6: Moderate degenerative narrowing of the interspace. Mild  broad-based disc osteophyte complex and small uncinate spurs. Minimal  central stenosis and minimal bilateral foraminal stenosis.     C6-C7: Moderate degenerative narrowing of the interspace. No disc  protrusion. No central or lateral stenosis.     C7-T1:  Negative.                                                                      IMPRESSION:  1. Degenerative changes as described, most marked at C5-C6 and C6-C7.  2. No intrinsic cord abnormality through T2.     MARTHA MCCARTY MD           MR LUMBAR SPINE W/O & W CONTRAST 1/6/2019 3:49 PM     Provided History: Back pain, > 6wks conservative tx, persistent sx;  pain in the left paraspinal region- now with fungemia, persistent  blood stream infections since Oct 2018.     Comparison: No similar studies     Technique: Sagittal T1-weighted and T2-weighted and axial T2-weighted  images of the lumbar spine were obtained without intravenous contrast.  Post intravenous contrast using gadolinium axial and sagittal  T1-weighted images were obtained with fat saturation.     Contrast: 8.9CC GADAVIST     Findings: Regarding numbering convention, there are 5 lumbar-type  vertebrae assumed for the purposes of this dictation.  The tip of the  conus medullaris is at L1.  Regarding alignment, the lumbar vertebral  column appears normally aligned.  There is no significant disc height  narrowing at any level.  Regarding bone marrow signal intensity, no  abnormality is visualized on STIR images.     On a level by level basis:     L1-2: No significant spinal canal or foraminal narrowing.     L2-3: Minimal disc bulge. Mild thickening of the ligamentum flavum. No  significant spinal canal or foraminal narrowing.     L3-4: Mild disc bulge and thickening of the ligamentum flavum with  mild spinal canal narrowing. No neural foraminal narrowing.     L4-5: Mild disc bulge and thickening of the ligamentum flavum. No  central spinal canal narrowing or neuroforaminal stenosis.      L5-S1: No significant spinal canal or foraminal narrowing.     3 bandlike areas of high STIR signal and enhancement in the right  posterior paraspinous muscles.                                                                      Impression:  1. No abnormal signal  within the spine to suggest fungal infection.  Mild nonspecific enhancement and STIR hyperintensity in the right  posterior paraspinous muscles, potentially myositis.  2. Multilevel lumbar spondylosis.     I have personally reviewed the examination and initial interpretation  and I agree with the findings.     YOLA TELLEZ MD           Other testing (labs, diagnostics):  5/25/2022  Cr. 0.69  Est GFR >90        Screening tools:      DIRE Score for ongoing opioid management is calculated as follows:     Diagnosis = 2     Intractability = 2     Risk: Psych = 3  Chem Hlth = 2  Reliability = 2  Social = 2     Efficacy = 2     Total DIRE Score = 15 (14 or higher predicts good candidate for ongoing opioid management; 13 or lower predicts poor candidate for opioid management)         [place] Prescription Monitoring Program:  Reviewed [place]  August 23, 2022- no concerning fills.  Natalie MENARD, RN CNP, FNP  [facility]  [place] location          Assessment:   1. Visceral hyperalgesia  2. Chronic abdominal pain  3. Chronic pain syndrome  4. PMHx includes: ADHD, anxiety, cardiomyopathy and nutritional diseases, chronic abdominal pain, central line associated bloodstream infection recurrent, anesthesia complication, difficulty swallowing, gastric ulcer unspecified as acute or chronic without mention of hemorrhage perforation or obstruction, gastroesophageal reflux disease, head injury, hiatal hernia, other bladder disorder, other chronic pain, postop nausea and vomiting, severe malnutrition on TPN, short gut syndrome, tobacco abuse  5. PSHx includes: Appendectomy, back surgery (11/3/2014), biopsy of cervical lymph node (2/20/2015), cholecystectomy, colonoscopy (2021, 2022), cervical anterior 1 level discectomy and fusion (2/15/2017), endoscopic insertion tube gastrostomy (9/9/2013), endoscopic ultrasound upper gastrointestinal tract (4/29/2011), endoscopic ultrasound upper gastrointestinal  tract (9/9/2013), endoscopic ultrasound upper gastrointestinal tract (2/24/2021), endoscopy (3/25/2011, 8/4/2009, 1/5/2009), multiple EGDs, gastrectomy (6/22/2012), gastrojejunostomy (8/26/2009), umbilical hernia repair (2006), hernia raphe hiatal (6/22/2012), herniorrhaphy inguinal (11/22/2013), insert PICC line on the right (12/19/2019), insert PICC line right (2/21/2020), insert vascular access port on the right (12/19/2017, 8/2/2018), multiple interventional radiology CVC tunnel placement and removals, exploratory laparotomy (11/22/2013), orthopedic surgery, Celestino-en-Y gastric bypass (2003), tonsillectomy.        Plan:   1. Physical Therapy: none  2. Clinical Health Psychologist to address issues of relaxation, behavioral change, coping style, and other factors important to improvement: none  3. Continue gentle movement at home  4. Diagnostic Studies: none  5. Medication Management:   1. Continue fentanyl patch 25mcg/hr every 48 hours, fill 9/2 and start 9/6, early fill due to out of town travel  2. Oxycodone liquid 5mg/5ml,  use 5-10mg (5-10mls) every 4 hours as needed, max of 40mg (40ml) per day. Fill 9/2 and start 9/6. Early fill due to out of town travel  6. Further procedures recommended: nne  7. Acupuncture: I am fine with this  8. Urine toxicology screen today: none  9. Recommendations/follow-up for PCP:  See above  10. Release of information: none  11. Follow up: 10-12 weeks for in-person visit. Please call [phone] to make your follow-up appointment with me.       Phone call duration: 13 minutes    BILLING TIME DOCUMENTATION:   TOTAL TIME includes:   Time spent preparing to see the patient: 2 minutes (reviewing records and tests)  Time spend face to face with the patient: 13 minutes  Time spent ordering tests, medications, procedures and referrals: 0 minutes  Time spent Referring and communicating with other healthcare professionals: 0 minutes  Documenting clinical information in Epic: 2  minutes    The total TIME spent on this patient on the day of the appointment was 17 minutes.        Natalie MENARD RN CNP, FNP  [facility]

## 2022-08-24 ENCOUNTER — PATIENT OUTREACH (OUTPATIENT)
Dept: CARE COORDINATION | Facility: CLINIC | Age: 52
End: 2022-08-24

## 2022-08-24 RX ORDER — ENOXAPARIN SODIUM 100 MG/ML
80 INJECTION SUBCUTANEOUS 2 TIMES DAILY
Qty: 48 ML | Refills: 0 | Status: SHIPPED | OUTPATIENT
Start: 2022-08-24 | End: 2022-09-26

## 2022-08-24 ASSESSMENT — ACTIVITIES OF DAILY LIVING (ADL): DEPENDENT_IADLS:: TRANSPORTATION

## 2022-08-24 NOTE — PROGRESS NOTES
Clinic Care Coordination Contact    Follow Up Progress Note      Assessment: Called and spoke to the patient's wife Rose (consent to communicate on file). Rose reports patient is doing well. He is still dealing with a sprained knee. Things are going OK with the knee. Slowly improving.     Rose reports patient's home care nurse was over to see Parker yesterday. Once she was there, Rose reports the home care nurse reported she was exposed to COVID. This concerns Rose and Parker. Rose called [employer] and the company advised it was fine to have the RN out as long as she was wearing her mask (which she was per Rose). This still makes Rose nervous but they are OK just watching for symptoms and waiting.     No new concerns at this time.     Care Gaps:    Health Maintenance Due   Topic Date Due     SPIROMETRY  Never done     COPD ACTION PLAN  Never done     MEDICARE ANNUAL WELLNESS VISIT  06/21/2017     COVID-19 Vaccine (3 - Booster for Moderna series) 01/09/2022       Postponed to next PCP visit     Care Plans  Care Plan: Annual Wellness     Problem: Appointment     Goal: I will complete my annual wellness visit.     Start Date: 5/20/2022 Expected End Date: 10/2/2022    This Visit's Progress: 10%    Note:     Barriers: complex care, and high volume of appointments at baseline  Strengths: wife is pts main caregiver, and organizes his appointments.   Patient expressed understanding of goal: yes  Action steps to achieve this goal:  1. I will schedule my annual wellness visit; [phone] ([phone])  2. I will attend my annual wellness visit.  3. I will contact my Care Management or clinic team if I have barriers to attending my annual wellness visit.                          Intervention/Education provided during outreach: As above. CC role, goal(s), clinic after hours, appointments, discussed/reviewed. Support provided.     Outreach Frequency: monthly    Plan:   1. Watch for COVID symptoms.   2. Call Primary Care  Provider's office with any concerning symptoms.   3. Schedule annual wellness visit.   4. Patient/caregiver will call RNCC with questions, concerns, support needs. RNCC will be available as needed.     Care Coordinator will follow up in 1 month.     Kinsey Muhammad RN Care Coordination   [facility]  Email: [email or web address]  Phone: [phone]

## 2022-08-24 NOTE — PROGRESS NOTES
Clinic Care Coordination - Chart Review Only    Situation/Background: Patient chart reviewed by care coordinator related to [facility] conversion.    Assessment: Patient continues to be followed by Clinic Care Coordination.    Plan: Patient's chart updated to align with [facility] program for ongoing patient management.    Kinsey Muhammad RN Care Coordination   [facility]  Email: [email or web address]  Phone: [phone]

## 2022-08-24 NOTE — TELEPHONE ENCOUNTER
Requested Prescriptions   Pending Prescriptions Disp Refills    enoxaparin ANTICOAGULANT (LOVENOX) 80 MG/0.8ML syringe 48 mL 0     Sig: Inject 0.8 mLs (80 mg) Subcutaneous 2 times daily        There is no refill protocol information for this order           Jyothi Robertson, YADIRAN, RN

## 2022-08-24 NOTE — PROGRESS NOTES
This is a recent snapshot of the patient's [facility] medical record.  For current drug dose and complete information and questions, call [phone] or In Basket pool, fv home infusion ([id number])  CSN Number:  [id number]

## 2022-08-25 ENCOUNTER — HOSPITAL ENCOUNTER (OUTPATIENT)
Facility: CLINIC | Age: 52
End: 2022-08-25
Attending: INTERNAL MEDICINE | Admitting: INTERNAL MEDICINE
Payer: COMMERCIAL

## 2022-08-25 ENCOUNTER — TELEPHONE (OUTPATIENT)
Dept: GASTROENTEROLOGY | Facility: CLINIC | Age: 52
End: 2022-08-25

## 2022-08-25 DIAGNOSIS — Z11.52 ENCOUNTER FOR PREPROCEDURE SCREENING LABORATORY TESTING FOR COVID-19: Primary | ICD-10-CM

## 2022-08-25 DIAGNOSIS — Z01.812 ENCOUNTER FOR PREPROCEDURE SCREENING LABORATORY TESTING FOR COVID-19: Primary | ICD-10-CM

## 2022-08-25 NOTE — TELEPHONE ENCOUNTER
Screening Questions    BlueKIND OF PREP RedLOCATION [review exclusion criteria] GreenSEDATION TYPE      1. Are you active on mychart? Y    2. What insurance is in the chart? [employer]     3.   Ordering/Referring Provider: Jimbo Estrada MD    4. BMI   (If greater than 40 review exclusion criteria [PAC APPT IF [MAC] @ [facility])  23.6  [If yes, BMI OVER 40-EXTENDED PREP]      **(Sedation review/consideration needed)**  Do you have a legal guardian or Medical Power of    and/or are you able to give consent for your medical care?     Y    5. Have you had a positive covid test in the last 90 days?   N -     6.  Are you currently on dialysis?   N [ If yes, G-PREP & HOSPITAL setting ONLY]     7.  Do you have chronic kidney disease?  N [ If yes, G-PREP ]    8.   Do you have a diagnosis of diabetes?   N   [ If yes, G-PREP ]    9.  On a regular basis do you go 3-5 days between bowel movements?   N   [ If yes, EXTENDED PREP]    10.  Are you taking any prescription pain medications on a routine schedule?    Y -  [ If yes, EXTENDED PREP] [If yes, MAC]      11.   Do you have any chemical dependencies such as alcohol, street drugs, or methadone?    N [If yes, MAC]    12.   Do you have any history of post-traumatic stress syndrome, severe anxiety or history of psychosis?    N  [If yes, MAC]    13.  [FEMALES] Are you currently pregnant? NA    If yes, how many weeks?       Respiratory/Heart Screening:  [If yes to any of the following HOSPITAL setting only]     14. Do you have Pulmonary Hypertension [Lungs]?   N       15. Do you have UNCONTROLLED asthma?   N     16.  Do you use daily home oxygen?  N      17. Do you have mod to severe Obstructive Sleep Apnea?         (OKAY @ [facility]  [facility]  SH  PH  RI  MG - if pt is not on OXYGEN)  N      18.   Have you had a heart or lung transplant?   N      19.   Have you had a stroke or Transient ischemic attack (TIA - aka  mini stroke ) within 6 months?  (If yes, please review exclusion  criteria)  N     20.   In the past 6 months, have you had any heart related issues including cardiomyopathy or heart attack?   N           If yes, did it require cardiac stenting or other implantable device?   NA      21.   Do you have any implantable devices in your body (pacemaker, defib, LVAD)? (If yes, please review exclusion criteria)  N (PINZON CATHETER, G-TUBE)     22.  Do you take the medication Phentermine?  NO        23. Do you take nitroglycerin?   N           If yes, how often? NA  (if yes, HOSPITAL setting ONLY)    24.  Are you currently taking any blood thinners?    [If yes, INFORM patient to follw up w/ ORDERING PROVIDER FOR BRIDGING INSTRUCTIONS]     Y, LOVENOX SHOTS     25.   Do you transfer independently?                (If NO, please HOSPITAL setting ONLY)  Y    26.   Preferred LOCAL Pharmacy for Pre Prescription:      [facility] - [place], [place] - [address] [facility]    Scheduling Details  (Please ask for phone number if not scheduled by patient)      Caller : EVE RAMOS   Date of Procedure: 11/22  Surgeon: CHAD   [facility]        Sedation Type: MAC  l PER ORDER  Conscious Sedation- Needs  for 6 hours after the procedure  MAC/General-Needs  for 24 hours after procedure    N :[Pre-op Required] at [facility]  SH  MG and OR for MAC sedation   (advise patient they will need a pre-op WITH IN 30 DAYS of procedure date)     Type of Procedure Scheduled:   Lower Endoscopy [Colonoscopy]    Which Colonoscopy Prep was Sent?:   EXTENDED - PER ORDER    KHORUTS CF PATIENTS & GROEN'S PATIENTS NEEDS EXTENDED PREP       Informed patient they will need an adult  Y  Cannot take any type of public or medical transportation alone    Pre-Procedure Covid test to be completed at [facility] or Externally: Y  **INFORMED OF HOME TESTING & LAB OPTION**        Confirmed Nurse will call to complete assessment Y    Additional comments:

## 2022-08-29 ENCOUNTER — LAB REQUISITION (OUTPATIENT)
Dept: LAB | Facility: CLINIC | Age: 52
End: 2022-08-29
Payer: COMMERCIAL

## 2022-08-29 DIAGNOSIS — R13.10 DYSPHAGIA, UNSPECIFIED: ICD-10-CM

## 2022-08-29 LAB
ALBUMIN SERPL-MCNC: 3.2 G/DL (ref 3.4–5)
ALP SERPL-CCNC: 75 U/L (ref 40–150)
ALT SERPL W P-5'-P-CCNC: 23 U/L (ref 0–70)
ANION GAP SERPL CALCULATED.3IONS-SCNC: 4 MMOL/L (ref 3–14)
AST SERPL W P-5'-P-CCNC: 13 U/L (ref 0–45)
BASOPHILS # BLD AUTO: 0 10E3/UL (ref 0–0.2)
BASOPHILS NFR BLD AUTO: 1 %
BILIRUB DIRECT SERPL-MCNC: <0.1 MG/DL (ref 0–0.2)
BILIRUB SERPL-MCNC: 0.3 MG/DL (ref 0.2–1.3)
BILIRUB SERPL-MCNC: 0.3 MG/DL (ref 0.2–1.3)
BUN SERPL-MCNC: 14 MG/DL (ref 7–30)
CALCIUM SERPL-MCNC: 7.9 MG/DL (ref 8.5–10.1)
CHLORIDE BLD-SCNC: 111 MMOL/L (ref 94–109)
CO2 SERPL-SCNC: 28 MMOL/L (ref 20–32)
CREAT SERPL-MCNC: 0.67 MG/DL (ref 0.66–1.25)
EOSINOPHIL # BLD AUTO: 0.2 10E3/UL (ref 0–0.7)
EOSINOPHIL NFR BLD AUTO: 3 %
ERYTHROCYTE [DISTWIDTH] IN BLOOD BY AUTOMATED COUNT: 13.8 % (ref 10–15)
GFR SERPL CREATININE-BSD FRML MDRD: >90 ML/MIN/1.73M2
GLUCOSE BLD-MCNC: 90 MG/DL (ref 70–99)
HCT VFR BLD AUTO: 36.4 % (ref 40–53)
HGB BLD-MCNC: 12.1 G/DL (ref 13.3–17.7)
IMM GRANULOCYTES # BLD: 0 10E3/UL
IMM GRANULOCYTES NFR BLD: 0 %
LYMPHOCYTES # BLD AUTO: 1.7 10E3/UL (ref 0.8–5.3)
LYMPHOCYTES NFR BLD AUTO: 31 %
MAGNESIUM SERPL-MCNC: 2 MG/DL (ref 1.6–2.3)
MCH RBC QN AUTO: 31.6 PG (ref 26.5–33)
MCHC RBC AUTO-ENTMCNC: 33.2 G/DL (ref 31.5–36.5)
MCV RBC AUTO: 95 FL (ref 78–100)
MONOCYTES # BLD AUTO: 0.5 10E3/UL (ref 0–1.3)
MONOCYTES NFR BLD AUTO: 9 %
NEUTROPHILS # BLD AUTO: 3.2 10E3/UL (ref 1.6–8.3)
NEUTROPHILS NFR BLD AUTO: 56 %
NRBC # BLD AUTO: 0 10E3/UL
NRBC BLD AUTO-RTO: 0 /100
PHOSPHATE SERPL-MCNC: 3 MG/DL (ref 2.5–4.5)
PLATELET # BLD AUTO: 221 10E3/UL (ref 150–450)
POTASSIUM BLD-SCNC: 3.5 MMOL/L (ref 3.4–5.3)
PROT SERPL-MCNC: 6.4 G/DL (ref 6.8–8.8)
RBC # BLD AUTO: 3.83 10E6/UL (ref 4.4–5.9)
SODIUM SERPL-SCNC: 143 MMOL/L (ref 133–144)
TRIGL SERPL-MCNC: 59 MG/DL
WBC # BLD AUTO: 5.6 10E3/UL (ref 4–11)

## 2022-08-29 PROCEDURE — 85025 COMPLETE CBC W/AUTO DIFF WBC: CPT | Performed by: SURGERY

## 2022-08-29 PROCEDURE — 83735 ASSAY OF MAGNESIUM: CPT | Performed by: SURGERY

## 2022-08-29 PROCEDURE — 82248 BILIRUBIN DIRECT: CPT | Performed by: SURGERY

## 2022-08-29 PROCEDURE — 87340 HEPATITIS B SURFACE AG IA: CPT | Performed by: SURGERY

## 2022-08-29 PROCEDURE — 80053 COMPREHEN METABOLIC PANEL: CPT | Performed by: SURGERY

## 2022-08-29 PROCEDURE — 84478 ASSAY OF TRIGLYCERIDES: CPT | Mod: GZ | Performed by: SURGERY

## 2022-08-29 PROCEDURE — 36592 COLLECT BLOOD FROM PICC: CPT | Performed by: SURGERY

## 2022-08-29 PROCEDURE — 84100 ASSAY OF PHOSPHORUS: CPT | Performed by: SURGERY

## 2022-08-29 NOTE — TELEPHONE ENCOUNTER
Spouse is calling on this stating [employer] needs a PA, update on when patient was last seen, and the need for the underpads. RN is unsure if this was done or not.     YADIRA HodgesN, RN

## 2022-08-30 LAB — HBV SURFACE AG SERPL QL IA: NONREACTIVE

## 2022-08-31 ENCOUNTER — MYC MEDICAL ADVICE (OUTPATIENT)
Dept: INTERNAL MEDICINE | Facility: CLINIC | Age: 52
End: 2022-08-31

## 2022-08-31 ENCOUNTER — HOME INFUSION (PRE-WILLOW HOME INFUSION) (OUTPATIENT)
Dept: PHARMACY | Facility: CLINIC | Age: 52
End: 2022-08-31

## 2022-08-31 DIAGNOSIS — M25.562 ACUTE PAIN OF LEFT KNEE: Primary | ICD-10-CM

## 2022-09-01 NOTE — TELEPHONE ENCOUNTER
Spoke with [employer] - all documentation has been given to [employer], she has sent the PA as a high priority.    Kristin Jaimes XRO/

## 2022-09-01 NOTE — TELEPHONE ENCOUNTER
Please send [employer] my note from August 2nd addressing the need for pads      Left message with representative at [facility] for help in contacting correct person/team at [facility].  - need fax number for faxing of records    Kristin Jaimes XRO/   none

## 2022-09-02 NOTE — PROGRESS NOTES
This is a recent snapshot of the patient's [facility] medical record.  For current drug dose and complete information and questions, call [phone] or In Basket pool, fv home infusion ([id number])  CSN Number:  [id number]

## 2022-09-09 ENCOUNTER — CARE COORDINATION (OUTPATIENT)
Dept: ENDOCRINOLOGY | Facility: CLINIC | Age: 52
End: 2022-09-09

## 2022-09-09 NOTE — PROGRESS NOTES
This is a recent snapshot of the patient's [facility] medical record.  For current drug dose and complete information and questions, call [phone] or In Basket pool, fv home infusion ([id number])  CSN Number:  [id number]

## 2022-09-09 NOTE — PROGRESS NOTES
This is a recent snapshot of the patient's [facility] medical record.  For current drug dose and complete information and questions, call [phone] or In Basket pool, fv home infusion ([id number])  CSN Number:  [id number]

## 2022-09-09 NOTE — PROGRESS NOTES
This is a recent snapshot of the patient's [facility] medical record.  For current drug dose and complete information and questions, call [phone] or In Basket pool, fv home infusion ([id number])  CSN Number:  [id number]

## 2022-09-09 NOTE — PROGRESS NOTES
This is a recent snapshot of the patient's [facility] medical record.  For current drug dose and complete information and questions, call [phone] or In Basket pool, fv home infusion ([id number])  CSN Number:  [id number]

## 2022-09-09 NOTE — PROGRESS NOTES
Patient needing a PA for extension tubing.  Unable to reach a person at [facility] to discuss what is needed for patient to receive PA for tubing.

## 2022-09-10 ENCOUNTER — HOME INFUSION (PRE-WILLOW HOME INFUSION) (OUTPATIENT)
Dept: PHARMACY | Facility: CLINIC | Age: 52
End: 2022-09-10

## 2022-09-12 NOTE — PROGRESS NOTES
This is a recent snapshot of the patient's [facility] medical record.  For current drug dose and complete information and questions, call [phone] or In Basket pool, fv home infusion ([id number])  CSN Number:  [id number]

## 2022-09-13 ENCOUNTER — LAB REQUISITION (OUTPATIENT)
Dept: LAB | Facility: CLINIC | Age: 52
End: 2022-09-13
Payer: COMMERCIAL

## 2022-09-13 DIAGNOSIS — R13.10 DYSPHAGIA, UNSPECIFIED: ICD-10-CM

## 2022-09-13 LAB
ALBUMIN SERPL-MCNC: 2.8 G/DL (ref 3.4–5)
ALP SERPL-CCNC: 55 U/L (ref 40–150)
ALT SERPL W P-5'-P-CCNC: 25 U/L (ref 0–70)
ANION GAP SERPL CALCULATED.3IONS-SCNC: 4 MMOL/L (ref 3–14)
AST SERPL W P-5'-P-CCNC: 16 U/L (ref 0–45)
BASOPHILS # BLD AUTO: 0 10E3/UL (ref 0–0.2)
BASOPHILS NFR BLD AUTO: 1 %
BILIRUB DIRECT SERPL-MCNC: <0.1 MG/DL (ref 0–0.2)
BILIRUB SERPL-MCNC: 0.4 MG/DL (ref 0.2–1.3)
BILIRUB SERPL-MCNC: 0.4 MG/DL (ref 0.2–1.3)
BUN SERPL-MCNC: 9 MG/DL (ref 7–30)
CALCIUM SERPL-MCNC: 8 MG/DL (ref 8.5–10.1)
CHLORIDE BLD-SCNC: 110 MMOL/L (ref 94–109)
CO2 SERPL-SCNC: 29 MMOL/L (ref 20–32)
CREAT SERPL-MCNC: 0.7 MG/DL (ref 0.66–1.25)
EOSINOPHIL # BLD AUTO: 0.2 10E3/UL (ref 0–0.7)
EOSINOPHIL NFR BLD AUTO: 5 %
ERYTHROCYTE [DISTWIDTH] IN BLOOD BY AUTOMATED COUNT: 13.2 % (ref 10–15)
FASTING STATUS PATIENT QL REPORTED: NORMAL
GFR SERPL CREATININE-BSD FRML MDRD: >90 ML/MIN/1.73M2
GLUCOSE BLD-MCNC: 84 MG/DL (ref 70–99)
HCT VFR BLD AUTO: 29.7 % (ref 40–53)
HGB BLD-MCNC: 9.9 G/DL (ref 13.3–17.7)
IMM GRANULOCYTES # BLD: 0 10E3/UL
IMM GRANULOCYTES NFR BLD: 0 %
LYMPHOCYTES # BLD AUTO: 1.5 10E3/UL (ref 0.8–5.3)
LYMPHOCYTES NFR BLD AUTO: 37 %
MAGNESIUM SERPL-MCNC: 1.9 MG/DL (ref 1.6–2.3)
MCH RBC QN AUTO: 31.8 PG (ref 26.5–33)
MCHC RBC AUTO-ENTMCNC: 33.3 G/DL (ref 31.5–36.5)
MCV RBC AUTO: 96 FL (ref 78–100)
MONOCYTES # BLD AUTO: 0.7 10E3/UL (ref 0–1.3)
MONOCYTES NFR BLD AUTO: 17 %
NEUTROPHILS # BLD AUTO: 1.6 10E3/UL (ref 1.6–8.3)
NEUTROPHILS NFR BLD AUTO: 40 %
NRBC # BLD AUTO: 0 10E3/UL
NRBC BLD AUTO-RTO: 0 /100
PHOSPHATE SERPL-MCNC: 3.4 MG/DL (ref 2.5–4.5)
PLATELET # BLD AUTO: 138 10E3/UL (ref 150–450)
POTASSIUM BLD-SCNC: 3.4 MMOL/L (ref 3.4–5.3)
PROT SERPL-MCNC: 5.6 G/DL (ref 6.8–8.8)
RBC # BLD AUTO: 3.11 10E6/UL (ref 4.4–5.9)
SODIUM SERPL-SCNC: 143 MMOL/L (ref 133–144)
TRIGL SERPL-MCNC: 68 MG/DL
WBC # BLD AUTO: 4 10E3/UL (ref 4–11)

## 2022-09-13 PROCEDURE — 85025 COMPLETE CBC W/AUTO DIFF WBC: CPT | Performed by: SURGERY

## 2022-09-13 PROCEDURE — 82248 BILIRUBIN DIRECT: CPT | Performed by: SURGERY

## 2022-09-13 PROCEDURE — 80053 COMPREHEN METABOLIC PANEL: CPT | Performed by: SURGERY

## 2022-09-13 PROCEDURE — 84478 ASSAY OF TRIGLYCERIDES: CPT | Performed by: SURGERY

## 2022-09-13 PROCEDURE — 83735 ASSAY OF MAGNESIUM: CPT | Performed by: SURGERY

## 2022-09-13 PROCEDURE — 84100 ASSAY OF PHOSPHORUS: CPT | Performed by: SURGERY

## 2022-09-14 ENCOUNTER — HOME INFUSION (PRE-WILLOW HOME INFUSION) (OUTPATIENT)
Dept: PHARMACY | Facility: CLINIC | Age: 52
End: 2022-09-14

## 2022-09-15 NOTE — PROGRESS NOTES
This is a recent snapshot of the patient's [facility] medical record.  For current drug dose and complete information and questions, call [phone] or In [facility] pool, fv home infusion ([id number])  CSN Number:  [id number]

## 2022-09-15 NOTE — PROGRESS NOTES
This is a recent snapshot of the patient's [facility] medical record.  For current drug dose and complete information and questions, call [phone] or In Basket pool, fv home infusion ([id number])  CSN Number:  [id number]

## 2022-09-15 NOTE — PROGRESS NOTES
This is a recent snapshot of the patient's [facility] medical record.  For current drug dose and complete information and questions, call [phone] or In Basket pool, fv home infusion ([id number])  CSN Number:  [id number]

## 2022-09-15 NOTE — PROGRESS NOTES
This is a recent snapshot of the patient's [facility] medical record.  For current drug dose and complete information and questions, call [phone] or In [facility] pool, fv home infusion ([id number])  CSN Number:  [id number]

## 2022-09-22 DIAGNOSIS — I82.90 VTE (VENOUS THROMBOEMBOLISM): ICD-10-CM

## 2022-09-22 DIAGNOSIS — G89.29 CHRONIC ABDOMINAL PAIN: ICD-10-CM

## 2022-09-22 DIAGNOSIS — G89.4 CHRONIC PAIN SYNDROME: ICD-10-CM

## 2022-09-22 DIAGNOSIS — R10.9 CHRONIC ABDOMINAL PAIN: ICD-10-CM

## 2022-09-22 DIAGNOSIS — F90.0 ADHD (ATTENTION DEFICIT HYPERACTIVITY DISORDER), INATTENTIVE TYPE: ICD-10-CM

## 2022-09-22 NOTE — PROGRESS NOTES
This is a recent snapshot of the patient's [facility] medical record.  For current drug dose and complete information and questions, call [phone] or In Basket pool, fv home infusion ([id number])  CSN Number:  [id number]     
26.31

## 2022-09-22 NOTE — TELEPHONE ENCOUNTER
Received call from patient requesting refill(s) of fentaNYL (DURAGESIC) 25 mcg/hr 72 hr patch     Last dispensed from pharmacy on 09/02/22    Patient's last office/virtual visit by prescribing provider on 08/23/22  Next office/virtual appointment scheduled for 11/09/22    Last urine drug screen date 01/05/22- Srini  Current opioid agreement on file (completed within the last year) Yes Date of opioid agreement: 05/27/22- Kurtis    E-prescribe to   [place], [place] - [address] [place] [address]  Phone: [phone] Fax: [phone]    Will route to nursing pool for review and preparation of prescription(s).       Deidra Banks MA  [facility]

## 2022-09-23 ENCOUNTER — HOME INFUSION (PRE-WILLOW HOME INFUSION) (OUTPATIENT)
Dept: PHARMACY | Facility: CLINIC | Age: 52
End: 2022-09-23

## 2022-09-23 ENCOUNTER — TELEPHONE (OUTPATIENT)
Dept: INTERNAL MEDICINE | Facility: CLINIC | Age: 52
End: 2022-09-23

## 2022-09-23 RX ORDER — FENTANYL 25 UG/1
1 PATCH TRANSDERMAL
Qty: 15 PATCH | Refills: 0 | Status: SHIPPED | OUTPATIENT
Start: 2022-09-23 | End: 2022-10-27

## 2022-09-23 NOTE — TELEPHONE ENCOUNTER
Requested Prescriptions   Pending Prescriptions Disp Refills     enoxaparin ANTICOAGULANT (LOVENOX) 80 MG/0.8ML syringe [Pharmacy Med Name: ENOXAPARIN SODIUM 80MG/0.8ML SOSY] 48 mL 0     Sig: INJECT 0.8 MLS SUBCUTANEOUS 2 TIMES DAILY     Next 5 appointments (look out 90 days)    Nov 18, 2022  2:30 PM  Pre-procedure Covid with PH COVID LAB  [facility] ([facility] ) [address] [place] [id number] [phone]           Routing refill request to provider for review/approval because:  Drug not on the G, P or [facility] refill protocol or controlled substance

## 2022-09-23 NOTE — TELEPHONE ENCOUNTER
Signed Prescriptions:                        Disp   Refills    fentaNYL (DURAGESIC) 25 mcg/hr 72 hr patch 15 pat*0        Sig: Place 1 patch onto the skin every 48 hours remove old           patch. Fill 10/4/22 and start 10/6/22. 30 days           for chronic pain.  Authorizing Provider: NATALIE MCDOWELL    Reviewed [place]  September 23, 2022- no concerning fills.    Natalie MENARD, RN CNP, FNP  [facility]

## 2022-09-23 NOTE — TELEPHONE ENCOUNTER
Medication refill information reviewed.     Due date for fentaNYL (DURAGESIC) 25 mcg/hr 72 hr patch  is 10/6/22     Prescriptions prepped for review.     Will route to provider.     Skinny Kim RN  Patient Care Supervisor   [facility] [place]

## 2022-09-23 NOTE — TELEPHONE ENCOUNTER
Requested Prescriptions   Pending Prescriptions Disp Refills     LORazepam (ATIVAN) 1 MG tablet [Pharmacy Med Name: LORAZEPAM 1MG TABS] 20 tablet 0     Sig: TAKE ONE TABLET BY MOUTH ONCE DAILY AS NEEDED FOR ANXIETY WITH TPN AND MEDICATIONS (#20 TO LAST 20 DAYS)     Next 5 appointments (look out 90 days)    Nov 18, 2022  2:30 PM  Pre-procedure Covid with PH COVID LAB  [facility] ([facility] ) [address] [place] [id number]  [phone]           Routing refill request to provider for review/approval because:  Drug not on the G, P or  [facility] refill protocol or controlled substance              amphetamine-dextroamphetamine (ADDERALL) 20 MG tablet [Pharmacy Med Name: ADDERALL 20MG TABS] 30 tablet 0     Sig: TAKE ONE TABLET BY MOUTH ONCE DAILY     Next 5 appointments (look out 90 days)    Nov 18, 2022  2:30 PM  Pre-procedure Covid with PH COVID LAB  [facility] ([address] [address]  [phone]           Routing refill request to provider for review/approval because:  Drug not on the G, P or  [facility] refill protocol or controlled substance

## 2022-09-23 NOTE — TELEPHONE ENCOUNTER
Reason for Call:  Form, our goal is to have forms completed with 72 hours, however, some forms may require a visit or additional information.    Type of letter, form or note:  Home Health Certification    Who is the form from?: Home care    Where did the form come from: form was faxed in    What clinic location was the form placed at?: [facility]    Where the form was placed: Given to MA/CHRISTY Rivera    What number is listed as a contact on the form?: [phone]       Additional comments: [facility]    Call taken on 9/23/2022 at 10:41 AM by Kristin Jaimes

## 2022-09-26 ENCOUNTER — PATIENT OUTREACH (OUTPATIENT)
Dept: CARE COORDINATION | Facility: CLINIC | Age: 52
End: 2022-09-26

## 2022-09-26 DIAGNOSIS — G89.29 CHRONIC ABDOMINAL PAIN: ICD-10-CM

## 2022-09-26 DIAGNOSIS — R10.9 CHRONIC ABDOMINAL PAIN: ICD-10-CM

## 2022-09-26 RX ORDER — ALBUTEROL SULFATE 90 UG/1
2 AEROSOL, METERED RESPIRATORY (INHALATION) EVERY 4 HOURS
COMMUNITY
End: 2023-01-26

## 2022-09-26 RX ORDER — DEXTROAMPHETAMINE SACCHARATE, AMPHETAMINE ASPARTATE, DEXTROAMPHETAMINE SULFATE AND AMPHETAMINE SULFATE 5; 5; 5; 5 MG/1; MG/1; MG/1; MG/1
TABLET ORAL
Qty: 30 TABLET | Refills: 0 | Status: SHIPPED | OUTPATIENT
Start: 2022-09-26 | End: 2022-11-01

## 2022-09-26 RX ORDER — DIPHENHYDRAMINE HCL 50 MG
50 CAPSULE ORAL 2 TIMES DAILY
COMMUNITY
End: 2023-05-23

## 2022-09-26 RX ORDER — ENOXAPARIN SODIUM 100 MG/ML
INJECTION SUBCUTANEOUS
Qty: 48 ML | Refills: 0 | Status: SHIPPED | OUTPATIENT
Start: 2022-09-26 | End: 2022-11-21

## 2022-09-26 RX ORDER — OXYCODONE HCL 5 MG/5 ML
5-10 SOLUTION, ORAL ORAL 4 TIMES DAILY PRN
Qty: 1200 ML | Refills: 0 | Status: SHIPPED | OUTPATIENT
Start: 2022-09-26 | End: 2022-10-27

## 2022-09-26 RX ORDER — LORAZEPAM 1 MG/1
TABLET ORAL
Qty: 20 TABLET | Refills: 0 | Status: SHIPPED | OUTPATIENT
Start: 2022-09-26 | End: 2022-10-05

## 2022-09-26 ASSESSMENT — ACTIVITIES OF DAILY LIVING (ADL): DEPENDENT_IADLS:: TRANSPORTATION

## 2022-09-26 NOTE — TELEPHONE ENCOUNTER
Received fax from pharmacy requesting refill(s) for oxyCODONE (ROXICODONE) 5 MG/5ML solution     Last dispensed from pharmacy on 9/2/22    Patient's last office/virtual visit by prescribing provider on 8/23/22  Next office/virtual appointment scheduled for 11/9/22    Last urine drug screen date 1/5/22  Current opioid agreement on file? Yes Date of opioid agreement: 5/27/22    E-prescribe to:    [facility] [place] - [address] [facility]    Will route to nursing [place] for review and preparation of prescription(s).

## 2022-09-26 NOTE — TELEPHONE ENCOUNTER
Medication refill information reviewed.     Due date for oxyCODONE (ROXICODONE) 5 MG/5ML solution is 10/06/22     Prescriptions prepped for review.     Will route to provider.

## 2022-09-26 NOTE — TELEPHONE ENCOUNTER
Signed Prescriptions:                        Disp   Refills    oxyCODONE (ROXICODONE) 5 MG/5ML solution   1200 mL0        Sig: Take 5-10 mLs (5-10 mg) by mouth 4 times daily as           needed for pain Max of 40mg/day. Put at least 4           hours between doses. Fill 10/04/22 and start           10/06/22. 30 day supply for chronic pain.  Authorizing Provider: NATALIE MCDOWELL    Reviewed [place]  September 26, 2022- no concerning fills.    Natalie MENARD, RN CNP, FNP  [facility]  [facility]

## 2022-09-26 NOTE — PROGRESS NOTES
Clinic Care Coordination Contact    Follow Up Progress Note      Assessment: Called and spoke to patient's wife (consent to communicate on file). She reports patient is doing well right now. He has an appointment tomorrow for his knee. Wife reports his knee has still been an issue. It seems to be giving out more now. Patient also has an appointment tomorrow to get his abdomen checked out. He is scheduled for a colonoscopy in November.     Wife reports they have been having issues getting some G tube supplies. Medical store is no longer able to provider some of the supplies patient needs. Wife is going to reach out to Dr. Vyas's team to see if that can help her at all.     Care Gaps:    Health Maintenance Due   Topic Date Due     SPIROMETRY  Never done     COPD ACTION PLAN  Never done     MEDICARE ANNUAL WELLNESS VISIT  06/21/2017     COVID-19 Vaccine (3 - Booster for Moderna series) 10/04/2021     INFLUENZA VACCINE (1) 09/01/2022       Postponed to next PCP visit     Care Plans  Care Plan: Annual Wellness     Problem: Appointment     Goal: I will complete my annual wellness visit.     Start Date: 5/20/2022 Expected End Date: 10/2/2022    This Visit's Progress: 10% Recent Progress: 10%    Note:     Barriers: complex care, and high volume of appointments at baseline  Strengths: wife is pts main caregiver, and organizes his appointments.   Patient expressed understanding of goal: yes  Action steps to achieve this goal:  1. I will schedule my annual wellness visit; [phone] ([phone])  2. I will attend my annual wellness visit.  3. I will contact my Care Management or clinic team if I have barriers to attending my annual wellness visit.                          Intervention/Education provided during outreach: As above. CC role, goal(s), clinic after hours, appointments, discussed/reviewed. Support provided.     Outreach Frequency: monthly    Plan:   1. Patient will call to schedule AWV.   2. Wife will call   [facility]'s office to see if they can help with G tube supplies.   3. Patient will attend knee appointment and abdomen appointment tomorrow.   4. Patient/caregiver will call RN CC with questions, concerns, support needs. RN CC will be available as needed.     Care Coordinator will follow up in 1 month.     Kinsey Muhammad RN Care Coordination   [facility]  Email: [email or web address]  Phone: [phone]

## 2022-09-27 ENCOUNTER — OFFICE VISIT (OUTPATIENT)
Dept: ORTHOPEDICS | Facility: CLINIC | Age: 52
End: 2022-09-27
Attending: INTERNAL MEDICINE
Payer: COMMERCIAL

## 2022-09-27 ENCOUNTER — MYC MEDICAL ADVICE (OUTPATIENT)
Dept: RHEUMATOLOGY | Facility: CLINIC | Age: 52
End: 2022-09-27

## 2022-09-27 ENCOUNTER — ANCILLARY PROCEDURE (OUTPATIENT)
Dept: GENERAL RADIOLOGY | Facility: CLINIC | Age: 52
End: 2022-09-27
Attending: PHYSICIAN ASSISTANT
Payer: COMMERCIAL

## 2022-09-27 ENCOUNTER — HOSPITAL ENCOUNTER (OUTPATIENT)
Dept: ULTRASOUND IMAGING | Facility: CLINIC | Age: 52
Discharge: HOME | End: 2022-09-27
Attending: INTERNAL MEDICINE
Payer: COMMERCIAL

## 2022-09-27 VITALS
SYSTOLIC BLOOD PRESSURE: 110 MMHG | HEIGHT: 71 IN | BODY MASS INDEX: 24.71 KG/M2 | WEIGHT: 176.5 LBS | DIASTOLIC BLOOD PRESSURE: 82 MMHG

## 2022-09-27 DIAGNOSIS — R16.0 HEPATOMEGALY: ICD-10-CM

## 2022-09-27 DIAGNOSIS — Z53.9 DIAGNOSIS NOT YET DEFINED: Primary | ICD-10-CM

## 2022-09-27 DIAGNOSIS — S83.512A RUPTURE OF ANTERIOR CRUCIATE LIGAMENT OF LEFT KNEE, INITIAL ENCOUNTER: Primary | ICD-10-CM

## 2022-09-27 DIAGNOSIS — M25.562 LEFT KNEE PAIN: ICD-10-CM

## 2022-09-27 PROCEDURE — G0179 MD RECERTIFICATION HHA PT: HCPCS | Performed by: INTERNAL MEDICINE

## 2022-09-27 PROCEDURE — 73560 X-RAY EXAM OF KNEE 1 OR 2: CPT | Mod: TC | Performed by: RADIOLOGY

## 2022-09-27 PROCEDURE — 76700 US EXAM ABDOM COMPLETE: CPT

## 2022-09-27 PROCEDURE — 99204 OFFICE O/P NEW MOD 45 MIN: CPT | Performed by: PHYSICIAN ASSISTANT

## 2022-09-27 RX ORDER — HYDROXYZINE PAMOATE 25 MG/1
CAPSULE ORAL
Qty: 30 CAPSULE | Refills: 1 | Status: SHIPPED | OUTPATIENT
Start: 2022-09-27 | End: 2023-04-05

## 2022-09-27 ASSESSMENT — PAIN SCALES - GENERAL: PAINLEVEL: EXTREME PAIN (8)

## 2022-09-27 NOTE — PROGRESS NOTES
This is a recent snapshot of the patient's [facility] medical record.  For current drug dose and complete information and questions, call [phone] or In Basket pool, fv home infusion ([id number])  CSN Number:  [id number]

## 2022-09-27 NOTE — LETTER
9/27/2022         RE: Parker Acevedo  [address]  [place] [address]        Dear Colleague,    Thank you for referring your patient, Parker Acevedo, to the [facility]. Please see a copy of my visit note below.    ORTHOPEDIC CONSULT      Chief Complaint: Parker Acevedo is a 51 year old male who is disabled but used to work for [employer].  He is disabled because of his gastric bypass and other medical issues.  He does enjoy hunting.  Wife's name is Rose.      He is being seen for   Chief Complaints and History of Present Illnesses   Patient presents with     Knee Pain     Left knee pain     Consult     Ref: Dr. Isaac         History of Present Illness:   Mechanism of Injury: Buckled his knee about 3 weeks ago.  He heard a very loud pop and then had immediate, very significant swelling.  Location: Left knee  Duration of Pain: Since 9/3/2022.  So 24 days ago  Rating of Pain: 8 out of 10  Pain Quality: Achy and throbbing  Pain is better with: Rest and elevation  Pain is worse with: Ambulation  Treatment so far consists of: Patient use the knee immobilizer that he got previously in the emergency department.  He has tried his Coppertone brace.  He thought both of these have helped him.  Tylenol has helped slightly and heat he thought helped slightly.  He has tried elevation that has helped.  After his injury on 9/3/2022 he has not seen any medical provider till just now.  He cannot take NSAIDs because of an ulcers.  Associated Features: Patient denies any shooting burning electric pain or any numbness or tingling.  Prior history of related problems: No previous major surgery or trauma or cyst or fracture to the left knee.  Pain is Limiting: Range of motion and a lot of ambulation  Here to: Orthopedic consultation  The Pain Has: Been about the same  Additional History: Patient has had difficulty bending his knees feels to be stiff.  Also swelling has neglected  his banding.  Patient has had difficulty sleeping and has had problems with instability of the knee.      Patient's past medical, surgical, social and family histories reviewed.     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     Complication of anesthesia      Difficulty swallowing      Gastric ulcer, unspecified as acute or chronic, without mention of hemorrhage, perforation, or obstruction      Gastro-oesophageal reflux disease      Head injury      Hiatal hernia      Other bladder disorder      Other chronic pain      PONV (postoperative nausea and vomiting)      Severe malnutrition (H)     TPN     Short gut syndrome      Tobacco abuse         Past Surgical History:   Procedure Laterality Date     AMPUTATION       APPENDECTOMY       BACK SURGERY  11/3/2014    curve in the spine     BIOPSY LYMPH NODE CERVICAL N/A 2/20/2015    Procedure: BIOPSY LYMPH NODE CERVICAL;  Surgeon: Baron Scanlon MD;  [facility]     CHOLECYSTECTOMY       COLONOSCOPY N/A 7/14/2021    Procedure: COLONOSCOPY;  Surgeon: Jimbo Estrada MD;  Location: [facility] OR     COLONOSCOPY N/A 4/13/2022    Procedure: COLONOSCOPY;  Surgeon: Jimbo Estrada MD;  Location: [facility] OR     DISCECTOMY, FUSION CERVICAL ANTERIOR ONE LEVEL, COMBINED N/A 2/15/2017    Procedure: COMBINED DISCECTOMY, FUSION CERVICAL ANTERIOR ONE LEVEL;  Surgeon: Darren Campos MD;  [facility]     ENDOSCOPIC INSERTION TUBE GASTROSTOMY  9/9/2013    Procedure: ENDOSCOPIC INSERTION TUBE GASTROSTOMY;;  Surgeon: Francis Vyas MD;  [facility]     ENDOSCOPIC ULTRASOUND UPPER GASTROINTESTINAL TRACT (GI)  4/29/2011    Procedure:ENDOSCOPIC ULTRASOUND UPPER GASTROINTESTINAL TRACT (GI);  Both Procedures done Conjointly; Surgeon:NEREIDA HOUSER; [facility]     ENDOSCOPIC ULTRASOUND UPPER GASTROINTESTINAL TRACT (GI)  9/9/2013    Procedure: ENDOSCOPIC ULTRASOUND UPPER GASTROINTESTINAL TRACT (GI);  Endoscopic Ultrasound Guide Gastrostomy Tube Placement  C-arm;  Surgeon: Noe Lizarraga MD;  [facility]     ENDOSCOPIC ULTRASOUND UPPER GASTROINTESTINAL TRACT (GI) N/A 2/24/2021    Procedure: ENDOSCOPIC ULTRASOUND, ESOPHAGOSCOPY / UPPER GASTROINTESTINAL TRACT (GI), esophagastrogastroduodenoscopy;  Surgeon: Berny Bach MD;  [facility]     ENDOSCOPY  03/25/11    EGD, [facility]     ENDOSCOPY  08/04/09    Upper Endoscopy, [facility]     ENDOSCOPY  01/05/09    Upper Endoscopy, [facility]     ESOPHAGOSCOPY, GASTROSCOPY, DUODENOSCOPY (EGD), COMBINED  4/20/2011    Procedure:COMBINED ESOPHAGOSCOPY, GASTROSCOPY, DUODENOSCOPY (EGD); Surgeon:BLU VYAS; [facility]     ESOPHAGOSCOPY, GASTROSCOPY, DUODENOSCOPY (EGD), COMBINED  6/15/2011    Procedure:COMBINED ESOPHAGOSCOPY, GASTROSCOPY, DUODENOSCOPY (EGD); Surgeon:BLU VYAS; [facility]     ESOPHAGOSCOPY, GASTROSCOPY, DUODENOSCOPY (EGD), COMBINED  6/12/2013    Procedure: COMBINED ESOPHAGOSCOPY, GASTROSCOPY, DUODENOSCOPY (EGD);;  Surgeon: Blu Vyas MD;  [facility]     ESOPHAGOSCOPY, GASTROSCOPY, DUODENOSCOPY (EGD), COMBINED  11/22/2013    Procedure: COMBINED ESOPHAGOSCOPY, GASTROSCOPY, DUODENOSCOPY (EGD);;  Surgeon: Blu Vyas MD;  [facility]     ESOPHAGOSCOPY, GASTROSCOPY, DUODENOSCOPY (EGD), COMBINED  4/30/2014    Procedure: COMBINED ESOPHAGOSCOPY, GASTROSCOPY, DUODENOSCOPY (EGD);  Surgeon: Blu Vyas MD;  [facility]     ESOPHAGOSCOPY, GASTROSCOPY, DUODENOSCOPY (EGD), COMBINED N/A 2/20/2015    Procedure: COMBINED ESOPHAGOSCOPY, GASTROSCOPY, DUODENOSCOPY (EGD), BIOPSY SINGLE OR MULTIPLE;  Surgeon: Baron Scanlon MD;  Location:  OR      ESOPHAGOSCOPY, GASTROSCOPY, DUODENOSCOPY (EGD), COMBINED N/A 9/30/2015    Procedure: COMBINED ESOPHAGOSCOPY, GASTROSCOPY, DUODENOSCOPY (EGD);  Surgeon: Francis Vyas MD;  Location:  GI     ESOPHAGOSCOPY, GASTROSCOPY, DUODENOSCOPY (EGD), COMBINED N/A 10/3/2019    Procedure: Upper Endoscopy;  Surgeon: Clif Morrow MD;  [facility]     GASTRECTOMY  6/22/2012    Procedure: GASTRECTOMY;  Open Approach, Excise Ulcers,Partial Gastrectomy, Esophagojejunostomy, Hiatal Hernia Repair, Extensive Lysis of Adhesions and Esaphagogastrodudenoscopy.;  Surgeon: Francis Vyas MD;  [facility]     GASTROJEJUNOSTOMY  08/26/09    Extensice enterolysis, partial resect. jejunum, part. resect gastric pouch, gastrojejunostomy anastomosis     HC ESOPH/GAS REFLUX TEST W NASAL IMPED ELECTRODE  8/5/2013    Procedure: ESOPHAGEAL IMPEDENCE FUNCTION TEST 1 HOUR OR LESS;  Surgeon: Halie Lang MD;  Location:  GI     HEAD & NECK SURGERY  2/15/2017    C5-C6     HERNIA REPAIR  2006    Umbilical hernia     HERNIORRHAPHY HIATAL  6/22/2012    Procedure: HERNIORRHAPHY HIATAL;;  Surgeon: Francis Vyas MD;  Location:  OR     HERNIORRHAPHY INGUINAL  11/22/2013    Procedure: HERNIORRHAPHY INGUINAL;;  Surgeon: Francis Vyas MD;  [facility]     INSERT PICC LINE Right 12/19/2019    Procedure: Picc Placement;  Surgeon: Per Dumont PA-C;  [facility]     INSERT PICC LINE Right 2/21/2020    Procedure: INSERTION, PICC;  Surgeon: Per Dumont PA-C;  [facility]     INSERT PORT VASCULAR ACCESS Right 12/19/2017    Procedure: INSERT PORT VASCULAR ACCESS;  Right Chest Port Placement ;  Surgeon: Lisandro Alejandro PA-C;  [facility]     INSERT PORT VASCULAR ACCESS Right 8/2/2018    Procedure: INSERT PORT VASCULAR ACCESS;  Place single lumen tunneled central venous access catheter;  Surgeon: Guy Jamil PA-C;  [facility]     IR CVC TUNNEL PLACEMENT > 5 YRS OF  AGE  8/7/2019     IR CVC TUNNEL PLACEMENT > 5 YRS OF AGE  4/14/2020     IR CVC TUNNEL PLACEMENT > 5 YRS OF AGE  8/3/2020     IR CVC TUNNEL PLACEMENT > 5 YRS OF AGE  9/4/2020     IR CVC TUNNEL PLACEMENT > 5 YRS OF AGE  2/5/2021     IR CVC TUNNEL PLACEMENT > 5 YRS OF AGE  3/23/2021     IR CVC TUNNEL PLACEMENT > 5 YRS OF AGE  1/13/2022     IR CVC TUNNEL PLACEMENT > 5 YRS OF AGE  5/12/2022     IR CVC TUNNEL PLACEMENT > 5 YRS OF AGE  7/13/2022     IR CVC TUNNEL REMOVAL RIGHT  10/1/2019     IR CVC TUNNEL REMOVAL RIGHT  7/30/2020     IR CVC TUNNEL REMOVAL RIGHT  9/2/2020     IR CVC TUNNEL REMOVAL RIGHT  2/3/2021     IR CVC TUNNEL REMOVAL RIGHT  3/19/2021     IR CVC TUNNEL REMOVAL RIGHT  1/10/2022     IR CVC TUNNEL REMOVAL RIGHT  5/9/2022     IR CVC TUNNEL REMOVAL RIGHT  7/8/2022     IR CVC TUNNEL REVISION LEFT  8/10/2022     IR CVC TUNNEL REVISION RIGHT  5/7/2021     IR FOLLOW UP VISIT OUTPATIENT  8/7/2019     IR PICC PLACEMENT > 5 YRS OF AGE  3/7/2019     IR PICC PLACEMENT > 5 YRS OF AGE  12/19/2019     IR PICC PLACEMENT > 5 YRS OF AGE  2/21/2020     LAPAROTOMY EXPLORATORY  11/22/2013    Procedure: LAPAROTOMY EXPLORATORY;  Exploratory Laparotomy, Upper Endoscopy, Left Inguinal Hernia Repair;  Surgeon: Francis Vyas MD;  [facility]     ORTHOPEDIC SURGERY       PICC INSERTION Right 03/16/2017    5fr DL BioFlo PICC, 42cm (3cm external) in the R medial brachial vein w/ tip in the SVC RA junction.     PICC INSERTION Left 09/23/2017    5fr DL BioFlo PICC, 45cm (1cm external) in the L basilic vein w/ tip in the SVC RA junction.     PICC INSERTION Right 05/16/2019    5Fr - 43cm, Medial brachial vein, low SVC     PICC INSERTION Right 10/02/2019    5Fr - 43cm (2cm external), basilic vein, low SVC     SHAYLEE EN Y BOWEL  2003     SOFT TISSUE SURGERY       THORACIC SURGERY       TONSILLECTOMY       TRANSESOPHAGEAL ECHOCARDIOGRAM INTRAOPERATIVE N/A 1/8/2019    Procedure: TRANSESOPHAGEAL ECHOCARDIOGRAM INTRAOPERATIVE;   "Surgeon: GENERIC ANESTHESIA PROVIDER;  Location:  OR     [facility] GASTRIC BYPASS,OBESE<100CM SHAYLEE-EN-Y  2002    lost 300 pounds       Medications:  albuterol (PROAIR HFA/PROVENTIL HFA/VENTOLIN HFA) 108 (90 Base) MCG/ACT inhaler, Inhale 2 puffs into the lungs every 4 hours  albuterol (PROAIR HFA/PROVENTIL HFA/VENTOLIN HFA) 108 (90 Base) MCG/ACT inhaler, Inhale 2 puffs into the lungs every 6 hours as needed  amphetamine-dextroamphetamine (ADDERALL) 20 MG tablet, TAKE ONE TABLET BY MOUTH ONCE DAILY  carvedilol (COREG) 6.25 MG tablet, Take 2 tablets (12.5 mg) by mouth 2 times daily (with meals)  cyanocobalamin (CYANOCOBALAMIN) 1000 MCG/ML injection, INJECT 1 ML INTO THE MUSCLE EVERY 30 DAYS  diphenhydrAMINE (BENADRYL) 50 MG capsule, Take 50 mg by mouth 2 times daily  enoxaparin ANTICOAGULANT (LOVENOX) 80 MG/0.8ML syringe, INJECT 0.8 MLS SUBCUTANEOUS 2 TIMES DAILY  EPINEPHrine (ANY BX GENERIC EQUIV) 0.3 MG/0.3ML injection 2-pack,   [place] [facility] INF[facility]ION MANAGED PATIENT, Contact [facility] for patient specific medication information at [phone] on admission and discharge from the hospital.  Phones are answered 24 hours a day 7 days a week 365 days a year.    Providers - Choose \"CONTINUE HOME MED (no script)\" at discharge if patient treatment with home infusion will continue.  fentaNYL (DURAGESIC) 25 mcg/hr 72 hr patch, Place 1 patch onto the skin every 48 hours remove old patch. Fill 10/4/22 and start 10/6/22. 30 days for chronic pain.  insulin syringe-needle U-100 (29G X 1/2\" 1 ML) 29G X 1/2\" 1 ML miscellaneous, Use to inject b12 every 30 days  lactated ringers infusion, Inject 1,000-2,000 mLs into the vein daily as needed  lidocaine (LIDODERM) 5 % patch, Place 1-2 patches onto the skin every 24 hours Wear for 12 hours, remove for 12 hours.  OK to cut to better fit to size.  LORazepam (ATIVAN) 1 MG tablet, TAKE ONE TABLET BY MOUTH ONCE DAILY AS NEEDED FOR ANXIETY WITH TPN AND MEDICATIONS (#20 TO LAST 20 " DAYS)  multivitamin, therapeutic (THERA-VIT) TABS tablet, Take 1 tablet by mouth daily  naloxone (NARCAN) 4 MG/0.1ML nasal spray, Spray 1 spray (4 mg) into one nostril alternating nostrils once as needed for opioid reversal every 2-3 minutes until assistance arrives  nystatin (MYCOSTATIN) 203084 UNIT/GM external cream, Apply topically 2 times daily  ondansetron (ZOFRAN-ODT) 8 MG ODT tab, DISSOLVE ONE TABLET ON TONGUE EVERY 8 HOURS AS NEEDED FOR NAUSEA  oxyCODONE (ROXICODONE) 5 MG/5ML solution, Take 5-10 mLs (5-10 mg) by mouth 4 times daily as needed for pain Max of 40mg/day. Put at least 4 hours between doses. Fill 10/04/22 and start 10/06/22. 30 day supply for chronic pain.  pantoprazole (PROTONIX) 40 MG EC tablet, Take 1 tablet (40 mg) by mouth daily  parenteral nutrition - PTA/DISCHARGE ORDER, Patient receives 2050 mL TPN every 14 hours through PICC at [facility].  polyethylene glycol (MIRALAX) 17 GM/Dose powder, Take 17 g by mouth daily  sucralfate (CARAFATE) 1 GM/10ML suspension, TAKE 10MLS  BY MOUTH FOUR TIMES A DAY AS NEEDED  vitamin D2 (ERGOCALCIFEROL) 24946 units (1250 mcg) capsule, TAKE 1 CAPSULE (50,000 UNITS) BY MOUTH ONCE A WEEK  [DISCONTINUED] NO ACTIVE MEDICATIONS, .    No current facility-administered medications on file prior to visit.      Allergies   Allergen Reactions     Bactrim [Sulfamethoxazole W/Trimethoprim] Rash     Penicillins Anaphylaxis     Please see Antimicrobial Management Team allergy assessment note 10/10/2018. Patient reported tolerating amoxicillin.     Ertapenem Nausea and Vomiting     Doxycycline Rash     Vancomycin Rash     Rash after receiving vancomycin 3/28/16 (infusion reaction?). Tolerated with slower infusion and diphenhydramine premed.       Social History     Occupational History     Not on file   Tobacco Use     Smoking status: Light Tobacco Smoker     Packs/day: 0.10     Years: 3.00     Pack years: 0.30     Types: Cigarettes     Smokeless tobacco: Never  "Used     Tobacco comment: 2/4/2021    smokes 3 cigarettes/day   Vaping Use     Vaping Use: Never used   Substance and Sexual Activity     Alcohol use: No     Comment: quit 2002     Drug use: No     Sexual activity: Yes     Partners: Female     Birth control/protection: None     Comment: no protection       Family History   Problem Relation Age of Onset     Gastrointestinal Disease Mother         Crohns disease     Anxiety Disorder Mother      Thyroid Disease Mother         Grave's disease     Cancer Father         ear cancer-skin cancer/melanoma     Breast Cancer Maternal Grandmother      Macular Degeneration Maternal Grandfather      Anxiety Disorder Sister      Diabetes Maternal Uncle      Breast Cancer Other      Hypertension No family hx of      Hyperlipidemia No family hx of      Cerebrovascular Disease No family hx of      Prostate Cancer No family hx of      Depression No family hx of      Anesthesia Reaction No family hx of      Asthma No family hx of      Osteoporosis No family hx of      Genetic Disorder No family hx of      Obesity No family hx of      Mental Illness No family hx of      Substance Abuse No family hx of      Glaucoma No family hx of        REVIEW OF SYSTEMS  10 point review systems performed otherwise negative as noted as per history of present illness.    Physical Exam:  Vitals: /82   Ht 1.803 m (5' 11\")   Wt 80.1 kg (176 lb 8 oz)   BMI 24.62 kg/m    BMI= Body mass index is 24.62 kg/m .    Constitutional: healthy, alert and no acute distress   Psychiatric: mentation appears normal and affect normal/bright  NEURO: no focal deficits, CMS intact left knee  RESP: Normal with easy respirations and no use of accessory muscles to breathe, no audible wheezing or retractions  CV: Calf soft and nontender to palpation, leg warm   SKIN: No erythema, rashes, excoriation, or breakdown. No evidence of infection.   MUSCULOSKELETAL:    INSPECTION of left knee: Visible swelling noted when compared " to the contralateral side.  Otherwise no gross deformities, erythema, edema, ecchymosis, atrophy or fasciculations.     PALPATION: No tenderness medial, lateral, anterior and posterior portion of the knee. No specific joint line tenderness. No increased warmth.  Mild effusion.     ROM: Extension 10 degrees short of full, flexion to 100 degrees. All range of motion without catching, locking but there is pain at maximal extension and flexion.     STRENGTH: 4 out of 5 quad and hamstring.     SPECIAL TEST: Patient has a positive Lachman's and this Lachman's is compared to the contralateral side which is very stable.  Lachman's on the left knee seems to not have a endpoint.  Negative drawer sign. Patient's knee is stable to varus and valgus stress at 30  of flexion. Patient has a negative Flako's.   GAIT: Not observed  Lymph: no palpable lymph nodes    Diagnostic Modalities:  Recent Results (from the past 744 hour(s))   XR Knee Left 1/2 Views    Narrative    XR KNEE LEFT 1/2 VIEWS 9/27/2022 12:48 PM    HISTORY: Left knee pain    COMPARISON: None.      Impression    IMPRESSION: Sunrise view only of the left knee shows no evidence of a  fracture or degenerative changes. The patella is centrally located.    TALIB MYERS MD         SYSTEM ID:  [id number]     I agree with the above reading.    I reviewed the x-rays that were done 8/9/2022 from the emergency department that show no fracture no dislocation no tumor and good joint spacing that is equal and congruent and good alignment.    I also reviewed the MRI that was done on 8/9/2022 and the impression is below which I agree with:                                                                   IMPRESSION:  1.  Knee joint effusion and diffuse subcutaneous edema.  2.  No evidence for internal derangement.    Independent visualization of the images was performed.    Impression: 1.  Left knee ACL injury.    Plan:  All of the above pertinent physical exam and imaging  modalities findings was reviewed with Parker and his wife Rose.    FOCUSED PLAN:  51-year-old male with injury on 9/3/2022 where he heard a pop and had significant swelling of the left knee.  Not been seen since for this.  Patient has laxity and not a good endpoint on his Lachman's test today when compared to the contralateral side.  Patient complains of instability.  Patient also has some stiffness secondary to wearing a knee immobilizer here and there.  Patient's MRI done 8/9/2022 was prior to the injury thus we are ordering another MRI to assess if the ACL is intact.  If it is not I will reach out to Dr. Orona to see if he would want to surgically repair this patient's knee as he has good joint spacing and cartilage.  I explained the surgery to the patient today.  I did give the patient some Vistaril to help sleep at night, 30 tablets 1 refill 1 to 2 tablets at night.  I will follow-up with a phone call after the MRI.    Re-x-ray on return: No      This note was dictated with Sjapper.    Eduardo Butler PA-C        Again, thank you for allowing me to participate in the care of your patient.        Sincerely,        Eduardo Butler PA-C

## 2022-09-27 NOTE — PROGRESS NOTES
ORTHOPEDIC CONSULT      Chief Complaint: Parker Acevedo is a 51 year old male who is disabled but used to work for [employer].  He is disabled because of his gastric bypass and other medical issues.  He does enjoy hunting.  Wife's name is Rose.      He is being seen for   Chief Complaints and History of Present Illnesses   Patient presents with     Knee Pain     Left knee pain     Consult     Ref: Dr. Isaac         History of Present Illness:   Mechanism of Injury: Buckled his knee about 3 weeks ago.  He heard a very loud pop and then had immediate, very significant swelling.  Location: Left knee  Duration of Pain: Since 9/3/2022.  So 24 days ago  Rating of Pain: 8 out of 10  Pain Quality: Achy and throbbing  Pain is better with: Rest and elevation  Pain is worse with: Ambulation  Treatment so far consists of: Patient use the knee immobilizer that he got previously in the emergency department.  He has tried his Coppertone brace.  He thought both of these have helped him.  Tylenol has helped slightly and heat he thought helped slightly.  He has tried elevation that has helped.  After his injury on 9/3/2022 he has not seen any medical provider till just now.  He cannot take NSAIDs because of an ulcers.  Associated Features: Patient denies any shooting burning electric pain or any numbness or tingling.  Prior history of related problems: No previous major surgery or trauma or cyst or fracture to the left knee.  Pain is Limiting: Range of motion and a lot of ambulation  Here to: Orthopedic consultation  The Pain Has: Been about the same  Additional History: Patient has had difficulty bending his knees feels to be stiff.  Also swelling has neglected his banding.  Patient has had difficulty sleeping and has had problems with instability of the knee.      Patient's past medical, surgical, social and family histories reviewed.     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)     mild EF ~45% on rest 2/13/17, improves with stressing     Chronic abdominal pain      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     Complication of anesthesia      Difficulty swallowing      Gastric ulcer, unspecified as acute or chronic, without mention of hemorrhage, perforation, or obstruction      Gastro-oesophageal reflux disease      Head injury      Hiatal hernia      Other bladder disorder      Other chronic pain      PONV (postoperative nausea and vomiting)      Severe malnutrition (H)     TPN     Short gut syndrome      Tobacco abuse         Past Surgical History:   Procedure Laterality Date     AMPUTATION       APPENDECTOMY       BACK SURGERY  11/3/2014    curve in the spine     BIOPSY LYMPH NODE CERVICAL N/A 2/20/2015    Procedure: BIOPSY LYMPH NODE CERVICAL;  Surgeon: Baron Scanlon MD;  [facility]     CHOLECYSTECTOMY       COLONOSCOPY N/A 7/14/2021    Procedure: COLONOSCOPY;  Surgeon: Jimbo Estrada MD;  Location: [facility] OR     COLONOSCOPY N/A 4/13/2022    Procedure: COLONOSCOPY;  Surgeon: Jimbo Estrada MD;  Location: [facility] OR     DISCECTOMY, FUSION CERVICAL ANTERIOR ONE LEVEL, COMBINED N/A 2/15/2017    Procedure: COMBINED DISCECTOMY, FUSION CERVICAL ANTERIOR ONE LEVEL;  Surgeon: Darren Campos MD;  [facility]     ENDOSCOPIC INSERTION TUBE GASTROSTOMY  9/9/2013    Procedure: ENDOSCOPIC INSERTION TUBE GASTROSTOMY;;  Surgeon: Francis Vyas MD;  [facility]     ENDOSCOPIC ULTRASOUND UPPER GASTROINTESTINAL TRACT (GI)  4/29/2011    Procedure:ENDOSCOPIC ULTRASOUND UPPER GASTROINTESTINAL TRACT (GI); Both Procedures done Conjointly; Surgeon:NEREIDA HOUSER; [facility]     ENDOSCOPIC ULTRASOUND UPPER GASTROINTESTINAL TRACT (GI)  9/9/2013    Procedure: ENDOSCOPIC ULTRASOUND UPPER GASTROINTESTINAL TRACT (GI);  Endoscopic Ultrasound Guide  Gastrostomy Tube Placement  C-arm;  Surgeon: Noe Lizarraga MD;  [facility]     ENDOSCOPIC ULTRASOUND UPPER GASTROINTESTINAL TRACT (GI) N/A 2/24/2021    Procedure: ENDOSCOPIC ULTRASOUND, ESOPHAGOSCOPY / UPPER GASTROINTESTINAL TRACT (GI), esophagastrogastroduodenoscopy;  Surgeon: Berny Bach MD;  [facility]     ENDOSCOPY  03/25/11    EGD, [facility]     ENDOSCOPY  08/04/09    Upper Endoscopy, [facility]     ENDOSCOPY  01/05/09    Upper Endoscopy, [facility]     ESOPHAGOSCOPY, GASTROSCOPY, DUODENOSCOPY (EGD), COMBINED  4/20/2011    Procedure:COMBINED ESOPHAGOSCOPY, GASTROSCOPY, DUODENOSCOPY (EGD); Surgeon:BLU VYAS; [facility]     ESOPHAGOSCOPY, GASTROSCOPY, DUODENOSCOPY (EGD), COMBINED  6/15/2011    Procedure:COMBINED ESOPHAGOSCOPY, GASTROSCOPY, DUODENOSCOPY (EGD); Surgeon:BLU VYAS; [facility]     ESOPHAGOSCOPY, GASTROSCOPY, DUODENOSCOPY (EGD), COMBINED  6/12/2013    Procedure: COMBINED ESOPHAGOSCOPY, GASTROSCOPY, DUODENOSCOPY (EGD);;  Surgeon: Blu Vyas MD;  [facility]     ESOPHAGOSCOPY, GASTROSCOPY, DUODENOSCOPY (EGD), COMBINED  11/22/2013    Procedure: COMBINED ESOPHAGOSCOPY, GASTROSCOPY, DUODENOSCOPY (EGD);;  Surgeon: Blu Vyas MD;  [facility]     ESOPHAGOSCOPY, GASTROSCOPY, DUODENOSCOPY (EGD), COMBINED  4/30/2014    Procedure: COMBINED ESOPHAGOSCOPY, GASTROSCOPY, DUODENOSCOPY (EGD);  Surgeon: Blu Vyas MD;  [facility]     ESOPHAGOSCOPY, GASTROSCOPY, DUODENOSCOPY (EGD), COMBINED N/A 2/20/2015    Procedure: COMBINED ESOPHAGOSCOPY, GASTROSCOPY, DUODENOSCOPY (EGD), BIOPSY SINGLE OR MULTIPLE;  Surgeon: Baron Scanlon MD;  Location:  OR     ESOPHAGOSCOPY, GASTROSCOPY, DUODENOSCOPY (EGD), COMBINED N/A 9/30/2015    Procedure: COMBINED ESOPHAGOSCOPY, GASTROSCOPY, DUODENOSCOPY (EGD);  Surgeon: Blu Vyas MD;  Location:  GI     ESOPHAGOSCOPY, GASTROSCOPY, DUODENOSCOPY (EGD),  COMBINED N/A 10/3/2019    Procedure: Upper Endoscopy;  Surgeon: Clif Morrow MD;  [facility]     GASTRECTOMY  6/22/2012    Procedure: GASTRECTOMY;  Open Approach, Excise Ulcers,Partial Gastrectomy, Esophagojejunostomy, Hiatal Hernia Repair, Extensive Lysis of Adhesions and Esaphagogastrodudenoscopy.;  Surgeon: Francis Vyas MD;  [facility]     GASTROJEJUNOSTOMY  08/26/09    Extensice enterolysis, partial resect. jejunum, part. resect gastric pouch, gastrojejunostomy anastomosis     HC ESOPH/GAS REFLUX TEST W NASAL IMPED ELECTRODE  8/5/2013    Procedure: ESOPHAGEAL IMPEDENCE FUNCTION TEST 1 HOUR OR LESS;  Surgeon: Halie Lang MD;  [facility]     HEAD & NECK SURGERY  2/15/2017    C5-C6     HERNIA REPAIR  2006    Umbilical hernia     HERNIORRHAPHY HIATAL  6/22/2012    Procedure: HERNIORRHAPHY HIATAL;;  Surgeon: Francis Vyas MD;  [facility]     HERNIORRHAPHY INGUINAL  11/22/2013    Procedure: HERNIORRHAPHY INGUINAL;;  Surgeon: Francis Vyas MD;  [facility]     INSERT PICC LINE Right 12/19/2019    Procedure: Picc Placement;  Surgeon: Per Dumont PA-C;  [facility]     INSERT PICC LINE Right 2/21/2020    Procedure: INSERTION, PICC;  Surgeon: Per Dumont PA-C;  [facility]     INSERT PORT VASCULAR ACCESS Right 12/19/2017    Procedure: INSERT PORT VASCULAR ACCESS;  Right Chest Port Placement ;  Surgeon: Lisandro Alejandro PA-C;  [facility]     INSERT PORT VASCULAR ACCESS Right 8/2/2018    Procedure: INSERT PORT VASCULAR ACCESS;  Place single lumen tunneled central venous access catheter;  Surgeon: Guy Jamil PA-C;  [facility]     IR CVC TUNNEL PLACEMENT > 5 YRS OF AGE  8/7/2019     IR CVC TUNNEL PLACEMENT > 5 YRS OF AGE  4/14/2020     IR CVC TUNNEL PLACEMENT > 5 YRS OF AGE  8/3/2020     IR CVC TUNNEL PLACEMENT > 5 YRS OF AGE  9/4/2020     IR CVC TUNNEL PLACEMENT > 5 YRS OF AGE  2/5/2021     IR CVC TUNNEL  PLACEMENT > 5 YRS OF AGE  3/23/2021     IR CVC TUNNEL PLACEMENT > 5 YRS OF AGE  1/13/2022     IR CVC TUNNEL PLACEMENT > 5 YRS OF AGE  5/12/2022     IR CVC TUNNEL PLACEMENT > 5 YRS OF AGE  7/13/2022     IR CVC TUNNEL REMOVAL RIGHT  10/1/2019     IR CVC TUNNEL REMOVAL RIGHT  7/30/2020     IR CVC TUNNEL REMOVAL RIGHT  9/2/2020     IR CVC TUNNEL REMOVAL RIGHT  2/3/2021     IR CVC TUNNEL REMOVAL RIGHT  3/19/2021     IR CVC TUNNEL REMOVAL RIGHT  1/10/2022     IR CVC TUNNEL REMOVAL RIGHT  5/9/2022     IR CVC TUNNEL REMOVAL RIGHT  7/8/2022     IR CVC TUNNEL REVISION LEFT  8/10/2022     IR CVC TUNNEL REVISION RIGHT  5/7/2021     IR FOLLOW UP VISIT OUTPATIENT  8/7/2019     IR PICC PLACEMENT > 5 YRS OF AGE  3/7/2019     IR PICC PLACEMENT > 5 YRS OF AGE  12/19/2019     IR PICC PLACEMENT > 5 YRS OF AGE  2/21/2020     LAPAROTOMY EXPLORATORY  11/22/2013    Procedure: LAPAROTOMY EXPLORATORY;  Exploratory Laparotomy, Upper Endoscopy, Left Inguinal Hernia Repair;  Surgeon: Francis Vyas MD;  [facility]     ORTHOPEDIC SURGERY       PICC INSERTION Right 03/16/2017    5fr DL BioFlo PICC, 42cm (3cm external) in the R medial brachial vein w/ tip in the SVC RA junction.     PICC INSERTION Left 09/23/2017    5fr DL BioFlo PICC, 45cm (1cm external) in the L basilic vein w/ tip in the SVC RA junction.     PICC INSERTION Right 05/16/2019    5Fr - 43cm, Medial brachial vein, low SVC     PICC INSERTION Right 10/02/2019    5Fr - 43cm (2cm external), basilic vein, low SVC     SHAYLEE EN Y BOWEL  2003     SOFT TISSUE SURGERY       THORACIC SURGERY       TONSILLECTOMY       TRANSESOPHAGEAL ECHOCARDIOGRAM INTRAOPERATIVE N/A 1/8/2019    Procedure: TRANSESOPHAGEAL ECHOCARDIOGRAM INTRAOPERATIVE;  Surgeon: GENERIC ANESTHESIA PROVIDER;  [facility]     [facility],OBESE<100CM SHAYLEE-EN-Y  2002    lost 300 pounds       Medications:  albuterol (PROAIR HFA/PROVENTIL HFA/VENTOLIN HFA) 108 (90 Base) MCG/ACT inhaler, Inhale 2 puffs into the  "lungs every 4 hours  albuterol (PROAIR HFA/PROVENTIL HFA/VENTOLIN HFA) 108 (90 Base) MCG/ACT inhaler, Inhale 2 puffs into the lungs every 6 hours as needed  amphetamine-dextroamphetamine (ADDERALL) 20 MG tablet, TAKE ONE TABLET BY MOUTH ONCE DAILY  carvedilol (COREG) 6.25 MG tablet, Take 2 tablets (12.5 mg) by mouth 2 times daily (with meals)  cyanocobalamin (CYANOCOBALAMIN) 1000 MCG/ML injection, INJECT 1 ML INTO THE MUSCLE EVERY 30 DAYS  diphenhydrAMINE (BENADRYL) 50 MG capsule, Take 50 mg by mouth 2 times daily  enoxaparin ANTICOAGULANT (LOVENOX) 80 MG/0.8ML syringe, INJECT 0.8 MLS SUBCUTANEOUS 2 TIMES DAILY  EPINEPHrine (ANY BX GENERIC EQUIV) 0.3 MG/0.3ML injection 2-pack,   [place] [facility] MANAGED PATIENT, Contact [facility] for patient specific medication information at [phone] on admission and discharge from the hospital.  Phones are answered 24 hours a day 7 days a week 365 days a year.    Providers - Choose \"CONTINUE HOME MED (no script)\" at discharge if patient treatment with home infusion will continue.  fentaNYL (DURAGESIC) 25 mcg/hr 72 hr patch, Place 1 patch onto the skin every 48 hours remove old patch. Fill 10/4/22 and start 10/6/22. 30 days for chronic pain.  insulin syringe-needle U-100 (29G X 1/2\" 1 ML) 29G X 1/2\" 1 ML miscellaneous, Use to inject b12 every 30 days  lactated ringers infusion, Inject 1,000-2,000 mLs into the vein daily as needed  lidocaine (LIDODERM) 5 % patch, Place 1-2 patches onto the skin every 24 hours Wear for 12 hours, remove for 12 hours.  OK to cut to better fit to size.  LORazepam (ATIVAN) 1 MG tablet, TAKE ONE TABLET BY MOUTH ONCE DAILY AS NEEDED FOR ANXIETY WITH TPN AND MEDICATIONS (#20 TO LAST 20 DAYS)  multivitamin, therapeutic (THERA-VIT) TABS tablet, Take 1 tablet by mouth daily  naloxone (NARCAN) 4 MG/0.1ML nasal spray, Spray 1 spray (4 mg) into one nostril alternating nostrils once as needed for opioid reversal every 2-3 minutes until " assistance arrives  nystatin (MYCOSTATIN) 676856 UNIT/GM external cream, Apply topically 2 times daily  ondansetron (ZOFRAN-ODT) 8 MG ODT tab, DISSOLVE ONE TABLET ON TONGUE EVERY 8 HOURS AS NEEDED FOR NAUSEA  oxyCODONE (ROXICODONE) 5 MG/5ML solution, Take 5-10 mLs (5-10 mg) by mouth 4 times daily as needed for pain Max of 40mg/day. Put at least 4 hours between doses. Fill 10/04/22 and start 10/06/22. 30 day supply for chronic pain.  pantoprazole (PROTONIX) 40 MG EC tablet, Take 1 tablet (40 mg) by mouth daily  parenteral nutrition - PTA/DISCHARGE ORDER, Patient receives 2050 mL TPN every 14 hours through PICC at [facility].  polyethylene glycol (MIRALAX) 17 GM/Dose powder, Take 17 g by mouth daily  sucralfate (CARAFATE) 1 GM/10ML suspension, TAKE 10MLS  BY MOUTH FOUR TIMES A DAY AS NEEDED  vitamin D2 (ERGOCALCIFEROL) 19208 units (1250 mcg) capsule, TAKE 1 CAPSULE (50,000 UNITS) BY MOUTH ONCE A WEEK  [DISCONTINUED] NO ACTIVE MEDICATIONS, .    No current facility-administered medications on file prior to visit.      Allergies   Allergen Reactions     Bactrim [Sulfamethoxazole W/Trimethoprim] Rash     Penicillins Anaphylaxis     Please see Antimicrobial Management Team allergy assessment note 10/10/2018. Patient reported tolerating amoxicillin.     Ertapenem Nausea and Vomiting     Doxycycline Rash     Vancomycin Rash     Rash after receiving vancomycin 3/28/16 (infusion reaction?). Tolerated with slower infusion and diphenhydramine premed.       Social History     Occupational History     Not on file   Tobacco Use     Smoking status: Light Tobacco Smoker     Packs/day: 0.10     Years: 3.00     Pack years: 0.30     Types: Cigarettes     Smokeless tobacco: Never Used     Tobacco comment: 2/4/2021    smokes 3 cigarettes/day   Vaping Use     Vaping Use: Never used   Substance and Sexual Activity     Alcohol use: No     Comment: quit 2002     Drug use: No     Sexual activity: Yes     Partners: Female     Birth  "control/protection: None     Comment: no protection       Family History   Problem Relation Age of Onset     Gastrointestinal Disease Mother         Crohns disease     Anxiety Disorder Mother      Thyroid Disease Mother         Grave's disease     Cancer Father         ear cancer-skin cancer/melanoma     Breast Cancer Maternal Grandmother      Macular Degeneration Maternal Grandfather      Anxiety Disorder Sister      Diabetes Maternal Uncle      Breast Cancer Other      Hypertension No family hx of      Hyperlipidemia No family hx of      Cerebrovascular Disease No family hx of      Prostate Cancer No family hx of      Depression No family hx of      Anesthesia Reaction No family hx of      Asthma No family hx of      Osteoporosis No family hx of      Genetic Disorder No family hx of      Obesity No family hx of      Mental Illness No family hx of      Substance Abuse No family hx of      Glaucoma No family hx of        REVIEW OF SYSTEMS  10 point review systems performed otherwise negative as noted as per history of present illness.    Physical Exam:  Vitals: /82   Ht 1.803 m (5' 11\")   Wt 80.1 kg (176 lb 8 oz)   BMI 24.62 kg/m    BMI= Body mass index is 24.62 kg/m .    Constitutional: healthy, alert and no acute distress   Psychiatric: mentation appears normal and affect normal/bright  NEURO: no focal deficits, CMS intact left knee  RESP: Normal with easy respirations and no use of accessory muscles to breathe, no audible wheezing or retractions  CV: Calf soft and nontender to palpation, leg warm   SKIN: No erythema, rashes, excoriation, or breakdown. No evidence of infection.   MUSCULOSKELETAL:    INSPECTION of left knee: Visible swelling noted when compared to the contralateral side.  Otherwise no gross deformities, erythema, edema, ecchymosis, atrophy or fasciculations.     PALPATION: No tenderness medial, lateral, anterior and posterior portion of the knee. No specific joint line tenderness. No " increased warmth.  Mild effusion.     ROM: Extension 10 degrees short of full, flexion to 100 degrees. All range of motion without catching, locking but there is pain at maximal extension and flexion.     STRENGTH: 4 out of 5 quad and hamstring.     SPECIAL TEST: Patient has a positive Lachman's and this Lachman's is compared to the contralateral side which is very stable.  Lachman's on the left knee seems to not have a endpoint.  Negative drawer sign. Patient's knee is stable to varus and valgus stress at 30  of flexion. Patient has a negative Flako's.   GAIT: Not observed  Lymph: no palpable lymph nodes    Diagnostic Modalities:  Recent Results (from the past 744 hour(s))   XR Knee Left 1/2 Views    Narrative    XR KNEE LEFT 1/2 VIEWS 9/27/2022 12:48 PM    HISTORY: Left knee pain    COMPARISON: None.      Impression    IMPRESSION: Sunrise view only of the left knee shows no evidence of a  fracture or degenerative changes. The patella is centrally located.    TALIB MYERS MD         SYSTEM ID:  [id number]     I agree with the above reading.    I reviewed the x-rays that were done 8/9/2022 from the emergency department that show no fracture no dislocation no tumor and good joint spacing that is equal and congruent and good alignment.    I also reviewed the MRI that was done on 8/9/2022 and the impression is below which I agree with:                                                                   IMPRESSION:  1.  Knee joint effusion and diffuse subcutaneous edema.  2.  No evidence for internal derangement.    Independent visualization of the images was performed.    Impression: 1.  Left knee ACL injury.    Plan:  All of the above pertinent physical exam and imaging modalities findings was reviewed with Parker and his wife Rose.    FOCUSED PLAN:  51-year-old male with injury on 9/3/2022 where he heard a pop and had significant swelling of the left knee.  Not been seen since for this.  Patient has laxity and  not a good endpoint on his Lachman's test today when compared to the contralateral side.  Patient complains of instability.  Patient also has some stiffness secondary to wearing a knee immobilizer here and there.  Patient's MRI done 8/9/2022 was prior to the injury thus we are ordering another MRI to assess if the ACL is intact.  If it is not I will reach out to Dr. Orona to see if he would want to surgically repair this patient's knee as he has good joint spacing and cartilage.  I explained the surgery to the patient today.  I did give the patient some Vistaril to help sleep at night, 30 tablets 1 refill 1 to 2 tablets at night.  I will follow-up with a phone call after the MRI.    Re-x-ray on return: No      This note was dictated with [employer].    Eduardo Butler PA-C

## 2022-09-29 ENCOUNTER — HOME INFUSION (PRE-WILLOW HOME INFUSION) (OUTPATIENT)
Dept: PHARMACY | Facility: CLINIC | Age: 52
End: 2022-09-29

## 2022-09-30 ENCOUNTER — DOCUMENTATION ONLY (OUTPATIENT)
Dept: MULTI SPECIALTY CLINIC | Facility: CLINIC | Age: 52
End: 2022-09-30

## 2022-09-30 NOTE — PROGRESS NOTES
Miscellaneous note:    Repeat Hepatitis B surface antigen from 8/29/22 was negative. Given negative HBV viral load (PCR), negative Hepatitis B e antigen and antibody and negative hepatitis B core antibody, I am suspicious that the isolated previous positive hepatitis B surface antigen from 7/8/22 is a false positive test.    US of abdomen:  1.  Liver remains mildly enlarged at 18.9 cm in length. This has  decreased in size from 23.2 cm in length on the prior study. Liver is grossly of normal echogenicity.  2.  Portal vein is prominent at 1.8 cm in diameter but flows in the  hepatopedal direction.  3.  Common bile duct is mildly dilated at 0.8 cm. This could be  compensatory in etiology status post cholecystectomy.  4.  No other significant abnormalities are identified.      I will inform the test results to the patient. No need for additional follow ups in the ID clinic. Continued follow up with primary care provider.

## 2022-10-03 NOTE — PROGRESS NOTES
This is a recent snapshot of the patient's [facility] medical record.  For current drug dose and complete information and questions, call [phone] or In Basket pool, [facility] home infusion ([id number])  CSN Number:  [id number]

## 2022-10-04 ENCOUNTER — MYC MEDICAL ADVICE (OUTPATIENT)
Dept: INTERNAL MEDICINE | Facility: CLINIC | Age: 52
End: 2022-10-04

## 2022-10-04 ENCOUNTER — TELEPHONE (OUTPATIENT)
Dept: GASTROENTEROLOGY | Facility: CLINIC | Age: 52
End: 2022-10-04

## 2022-10-04 ENCOUNTER — LAB REQUISITION (OUTPATIENT)
Dept: LAB | Facility: CLINIC | Age: 52
End: 2022-10-04
Payer: COMMERCIAL

## 2022-10-04 ENCOUNTER — HOSPITAL ENCOUNTER (OUTPATIENT)
Facility: CLINIC | Age: 52
End: 2022-10-04
Attending: INTERNAL MEDICINE | Admitting: INTERNAL MEDICINE
Payer: COMMERCIAL

## 2022-10-04 ENCOUNTER — HOME INFUSION (PRE-WILLOW HOME INFUSION) (OUTPATIENT)
Dept: PHARMACY | Facility: CLINIC | Age: 52
End: 2022-10-04

## 2022-10-04 ENCOUNTER — HOSPITAL ENCOUNTER (OUTPATIENT)
Facility: AMBULATORY SURGERY CENTER | Age: 52
End: 2022-10-04
Attending: INTERNAL MEDICINE | Admitting: INTERNAL MEDICINE
Payer: COMMERCIAL

## 2022-10-04 DIAGNOSIS — F90.0 ADHD (ATTENTION DEFICIT HYPERACTIVITY DISORDER), INATTENTIVE TYPE: ICD-10-CM

## 2022-10-04 DIAGNOSIS — R13.10 DYSPHAGIA, UNSPECIFIED: ICD-10-CM

## 2022-10-04 DIAGNOSIS — Z20.822 SUSPECTED 2019 NOVEL CORONAVIRUS INFECTION: Primary | ICD-10-CM

## 2022-10-04 LAB
CRP SERPL-MCNC: <2.9 MG/L (ref 0–8)
FERRITIN SERPL-MCNC: 32 NG/ML (ref 26–388)

## 2022-10-04 PROCEDURE — 83785 ASSAY OF MANGANESE: CPT | Performed by: SURGERY

## 2022-10-04 PROCEDURE — 82525 ASSAY OF COPPER: CPT | Performed by: SURGERY

## 2022-10-04 PROCEDURE — 84630 ASSAY OF ZINC: CPT | Performed by: SURGERY

## 2022-10-04 PROCEDURE — 82728 ASSAY OF FERRITIN: CPT | Performed by: SURGERY

## 2022-10-04 PROCEDURE — 84255 ASSAY OF SELENIUM: CPT | Mod: GZ | Performed by: SURGERY

## 2022-10-04 PROCEDURE — 86140 C-REACTIVE PROTEIN: CPT | Performed by: SURGERY

## 2022-10-04 NOTE — TELEPHONE ENCOUNTER
Caller: Martin    Procedure: colon    Date, Location, and Surgeon of Procedure Cancelled: 11/22/22 [facility]    Ordering Provider:sean    Reason for cancel (please be detailed, any staff messages or encounters to note?): md out of office    I left message for them to let them know moved to  with  as his wife Rose I talked to on 9/30 and she wanted him to have before the end of the year. Asked them to call back to verify they got the new information.     Rose his wife called back and Parker dos not want to go to  with  on 11/16 so we rescheduled him to 1/9/23 at [facility] with  and covid test on 1/6/23 at     Rescheduled: y     If rescheduled:    Date: 11/16/22   Location:  [facility]   Prep Resent: y(changes to prep?)   Covid Test Rescheduled: ph in clinic   Note any change or update to original order/sedation: same mac

## 2022-10-05 ENCOUNTER — HOSPITAL ENCOUNTER (OUTPATIENT)
Dept: MRI IMAGING | Facility: CLINIC | Age: 52
Discharge: HOME | End: 2022-10-05
Attending: PHYSICIAN ASSISTANT | Admitting: PHYSICIAN ASSISTANT
Payer: COMMERCIAL

## 2022-10-05 DIAGNOSIS — S83.512A RUPTURE OF ANTERIOR CRUCIATE LIGAMENT OF LEFT KNEE, INITIAL ENCOUNTER: ICD-10-CM

## 2022-10-05 PROCEDURE — 73721 MRI JNT OF LWR EXTRE W/O DYE: CPT | Mod: LT

## 2022-10-05 RX ORDER — LORAZEPAM 1 MG/1
TABLET ORAL
Qty: 30 TABLET | Refills: 0 | Status: SHIPPED | OUTPATIENT
Start: 2022-10-05 | End: 2022-11-01

## 2022-10-05 RX ORDER — LORAZEPAM 1 MG/1
TABLET ORAL
Qty: 30 TABLET | Refills: 0 | Status: SHIPPED | OUTPATIENT
Start: 2022-10-05 | End: 2022-10-05

## 2022-10-06 NOTE — PROGRESS NOTES
This is a recent snapshot of the patient's [facility] medical record.  For current drug dose and complete information and questions, call [phone] or In Basket pool, [facility] home infusion ([id number])  CSN Number:  [id number]

## 2022-10-07 ENCOUNTER — TELEPHONE (OUTPATIENT)
Dept: ORTHOPEDICS | Facility: CLINIC | Age: 52
End: 2022-10-07

## 2022-10-07 LAB
COPPER SERPL-MCNC: 116.3 UG/DL
MANGANESE BLD-MCNC: 8.6 UG/L
SELENIUM SERPL-MCNC: 115.3 UG/L
ZINC SERPL-MCNC: 82.7 UG/DL

## 2022-10-07 NOTE — TELEPHONE ENCOUNTER
I called the patient to go over his MRI results.  His ACL is actually intact but he as a lateral tibial stress fracture. I called and was able to get a hold of his wife.  He is to be NWB LLE x 6 weeks.  She will let him know.  He is not diabetic, but he does smoke off and on.  I told his wife to have him hold off on the smoking and make an apt to see me in 6 weeks. At that point we should be able to let him weight bear and he might need PT then also.

## 2022-10-11 ENCOUNTER — LAB REQUISITION (OUTPATIENT)
Dept: LAB | Facility: CLINIC | Age: 52
End: 2022-10-11
Payer: COMMERCIAL

## 2022-10-11 DIAGNOSIS — R13.10 DYSPHAGIA, UNSPECIFIED: ICD-10-CM

## 2022-10-11 LAB
ALBUMIN SERPL-MCNC: 3.4 G/DL (ref 3.4–5)
ALP SERPL-CCNC: 70 U/L (ref 40–150)
ALT SERPL W P-5'-P-CCNC: 35 U/L (ref 0–70)
ANION GAP SERPL CALCULATED.3IONS-SCNC: 5 MMOL/L (ref 3–14)
AST SERPL W P-5'-P-CCNC: 15 U/L (ref 0–45)
BASOPHILS # BLD AUTO: 0 10E3/UL (ref 0–0.2)
BASOPHILS NFR BLD AUTO: 1 %
BILIRUB DIRECT SERPL-MCNC: <0.1 MG/DL (ref 0–0.2)
BILIRUB SERPL-MCNC: 0.3 MG/DL (ref 0.2–1.3)
BILIRUB SERPL-MCNC: 0.3 MG/DL (ref 0.2–1.3)
BUN SERPL-MCNC: 10 MG/DL (ref 7–30)
CALCIUM SERPL-MCNC: 8.3 MG/DL (ref 8.5–10.1)
CHLORIDE BLD-SCNC: 110 MMOL/L (ref 94–109)
CO2 SERPL-SCNC: 28 MMOL/L (ref 20–32)
CREAT SERPL-MCNC: 0.67 MG/DL (ref 0.66–1.25)
EOSINOPHIL # BLD AUTO: 0.2 10E3/UL (ref 0–0.7)
EOSINOPHIL NFR BLD AUTO: 3 %
ERYTHROCYTE [DISTWIDTH] IN BLOOD BY AUTOMATED COUNT: 13.9 % (ref 10–15)
GFR SERPL CREATININE-BSD FRML MDRD: >90 ML/MIN/1.73M2
GLUCOSE BLD-MCNC: 92 MG/DL (ref 70–99)
HCT VFR BLD AUTO: 34.2 % (ref 40–53)
HGB BLD-MCNC: 11.2 G/DL (ref 13.3–17.7)
IMM GRANULOCYTES # BLD: 0 10E3/UL
IMM GRANULOCYTES NFR BLD: 0 %
LYMPHOCYTES # BLD AUTO: 1.6 10E3/UL (ref 0.8–5.3)
LYMPHOCYTES NFR BLD AUTO: 23 %
MAGNESIUM SERPL-MCNC: 1.9 MG/DL (ref 1.6–2.3)
MCH RBC QN AUTO: 31.2 PG (ref 26.5–33)
MCHC RBC AUTO-ENTMCNC: 32.7 G/DL (ref 31.5–36.5)
MCV RBC AUTO: 95 FL (ref 78–100)
MONOCYTES # BLD AUTO: 0.8 10E3/UL (ref 0–1.3)
MONOCYTES NFR BLD AUTO: 12 %
NEUTROPHILS # BLD AUTO: 4.3 10E3/UL (ref 1.6–8.3)
NEUTROPHILS NFR BLD AUTO: 61 %
NRBC # BLD AUTO: 0 10E3/UL
NRBC BLD AUTO-RTO: 0 /100
PHOSPHATE SERPL-MCNC: 3.1 MG/DL (ref 2.5–4.5)
PLATELET # BLD AUTO: 182 10E3/UL (ref 150–450)
POTASSIUM BLD-SCNC: 3.5 MMOL/L (ref 3.4–5.3)
PROT SERPL-MCNC: 6.8 G/DL (ref 6.8–8.8)
RBC # BLD AUTO: 3.59 10E6/UL (ref 4.4–5.9)
SODIUM SERPL-SCNC: 143 MMOL/L (ref 133–144)
TRIGL SERPL-MCNC: 62 MG/DL
WBC # BLD AUTO: 7.1 10E3/UL (ref 4–11)

## 2022-10-11 PROCEDURE — 80053 COMPREHEN METABOLIC PANEL: CPT | Performed by: SURGERY

## 2022-10-11 PROCEDURE — 85014 HEMATOCRIT: CPT | Performed by: SURGERY

## 2022-10-11 PROCEDURE — 82248 BILIRUBIN DIRECT: CPT | Performed by: SURGERY

## 2022-10-11 PROCEDURE — 84100 ASSAY OF PHOSPHORUS: CPT | Performed by: SURGERY

## 2022-10-11 PROCEDURE — 84478 ASSAY OF TRIGLYCERIDES: CPT | Performed by: SURGERY

## 2022-10-11 PROCEDURE — 83735 ASSAY OF MAGNESIUM: CPT | Performed by: SURGERY

## 2022-10-24 ENCOUNTER — PATIENT OUTREACH (OUTPATIENT)
Dept: CARE COORDINATION | Facility: CLINIC | Age: 52
End: 2022-10-24

## 2022-10-24 ASSESSMENT — ACTIVITIES OF DAILY LIVING (ADL): DEPENDENT_IADLS:: TRANSPORTATION

## 2022-10-24 NOTE — PROGRESS NOTES
Clinic Care Coordination Contact    Follow Up Progress Note      Assessment: RN CC connected with patient's spouse, Rose (CTC on file), for monthly outreach and goal progression. Rose states patient is doing okay. Rose states patient was seen by orthopedics and was told the patient would take 6-8 months to heal. Rose states they obtained a second opinion from an [facility] provider as well, but plan to follow up with [facility]. RN CC provided Rose the phone number to schedule that follow up appointment. Rose states patient has been staying off his leg and trying to avoid bearing weight on that leg, as advised. Rose states the swelling continues to decrease.     Rose states they continue to have issues obtaining certain G-tube supplies. Rose states she has been in touch with Dr. Lilliam Meléndez's nurse, and will reach out to her again with the information she obtained from [employer].    RN CC reviewed upcoming Pain Clinic appointment, which Rose was aware of. Rose states they were pleased with the transition to the new provider from Dr. Milligan.    Care Gaps:    Health Maintenance Due   Topic Date Due     SPIROMETRY  Never done     COPD ACTION PLAN  Never done     MEDICARE ANNUAL WELLNESS VISIT  06/21/2017     COVID-19 Vaccine (3 - Booster for Moderna series) 10/04/2021     INFLUENZA VACCINE (1) 09/01/2022     ZOSTER IMMUNIZATION (1 of 2) 11/06/2022       Not addressed at this time.    Care Plans  Care Plan: Annual Wellness     Problem: Appointment     Goal: I will complete my annual wellness visit.     Start Date: 5/20/2022 Expected End Date: 10/2/2022    This Visit's Progress: 10% Recent Progress: 10%    Note:     Barriers: complex care, and high volume of appointments at baseline  Strengths: wife is pts main caregiver, and organizes his appointments.   Patient expressed understanding of goal: yes  Action steps to achieve this goal:  1. I will schedule my annual wellness visit;  [phone] ([phone])  2. I will attend my annual wellness visit.  3. I will contact my Care Management or clinic team if I have barriers to attending my annual wellness visit.                          Intervention/Education provided during outreach: Patient reports understanding and denies any additional questions or concerns at this time. RN CC engaged in [employer] communication during encounter.      Outreach Frequency: monthly    Plan:   Patient's spouse to schedule follow up appointment with Orthopedics. Patient's spouse to follow up with Dr. Vyas's office regarding ongoing G-tube supply issues.  Care Coordinator will follow up in one month.    Eric Quiñones RN Care Coordinator  [facility]  (Covering for Lead RN Care Coordinator)

## 2022-10-26 DIAGNOSIS — F90.0 ADHD (ATTENTION DEFICIT HYPERACTIVITY DISORDER), INATTENTIVE TYPE: ICD-10-CM

## 2022-10-26 DIAGNOSIS — E53.8 VITAMIN B12 DEFICIENCY (NON ANEMIC): ICD-10-CM

## 2022-10-26 DIAGNOSIS — R11.0 NAUSEA: ICD-10-CM

## 2022-10-26 DIAGNOSIS — G89.29 CHRONIC ABDOMINAL PAIN: ICD-10-CM

## 2022-10-26 DIAGNOSIS — G89.4 CHRONIC PAIN SYNDROME: ICD-10-CM

## 2022-10-26 DIAGNOSIS — R10.9 CHRONIC ABDOMINAL PAIN: ICD-10-CM

## 2022-10-26 NOTE — TELEPHONE ENCOUNTER
Received call from patient requesting refill(s) of fentaNYL (DURAGESIC) 25 mcg/hr 72 hr patch   Last dispensed from pharmacy on 10/04/2022    Received call from patient requesting refill(s) of oxyCODONE (ROXICODONE) 5 MG/5ML solution   Last dispensed from pharmacy on 10/04/2022    Patient's last office/virtual visit by prescribing provider on 08/23/2022  Next office/virtual appointment scheduled for 11/09/2022    Last urine drug screen date 01/05/2022  Current opioid agreement on file (completed within the last year) Yes Date of opioid agreement: 05/27/2022    [place] [facility] [place] [address] [facility] [address]    Will route to nursing [place] for review and preparation of prescription(s).     Anastasiia Scott MA  [facility]

## 2022-10-27 NOTE — TELEPHONE ENCOUNTER
Medication refill information reviewed.     Due date for fentaNYL (DURAGESIC) 25 mcg/hr 72 hr patch and oxyCODONE (ROXICODONE) 5 MG/5ML solution  is 11/05/22     Prescriptions prepped for review.     Will route to provider.

## 2022-10-27 NOTE — PROGRESS NOTES
This is a recent snapshot of the patient's [facility] medical record.  For current drug dose and complete information and questions, call [phone] or In Basket pool, fv home infusion ([id number])  CSN Number:  [id number]

## 2022-10-28 RX ORDER — FENTANYL 25 UG/1
1 PATCH TRANSDERMAL
Qty: 15 PATCH | Refills: 0 | Status: SHIPPED | OUTPATIENT
Start: 2022-10-28 | End: 2022-11-09

## 2022-10-28 RX ORDER — OXYCODONE HCL 5 MG/5 ML
5-10 SOLUTION, ORAL ORAL 4 TIMES DAILY PRN
Qty: 1200 ML | Refills: 0 | Status: SHIPPED | OUTPATIENT
Start: 2022-10-28 | End: 2022-11-09

## 2022-11-01 RX ORDER — CYANOCOBALAMIN 1000 UG/ML
INJECTION, SOLUTION INTRAMUSCULAR; SUBCUTANEOUS
Qty: 1 ML | Refills: 3 | Status: SHIPPED | OUTPATIENT
Start: 2022-11-01 | End: 2024-02-06

## 2022-11-01 RX ORDER — LORAZEPAM 1 MG/1
TABLET ORAL
Qty: 30 TABLET | Refills: 0 | Status: SHIPPED | OUTPATIENT
Start: 2022-11-01 | End: 2022-12-01

## 2022-11-01 RX ORDER — DEXTROAMPHETAMINE SACCHARATE, AMPHETAMINE ASPARTATE, DEXTROAMPHETAMINE SULFATE AND AMPHETAMINE SULFATE 5; 5; 5; 5 MG/1; MG/1; MG/1; MG/1
TABLET ORAL
Qty: 30 TABLET | Refills: 0 | Status: SHIPPED | OUTPATIENT
Start: 2022-11-01 | End: 2022-11-18

## 2022-11-01 RX ORDER — ONDANSETRON 8 MG/1
TABLET, ORALLY DISINTEGRATING ORAL
Qty: 90 TABLET | Refills: 1 | Status: SHIPPED | OUTPATIENT
Start: 2022-11-01 | End: 2023-01-25

## 2022-11-01 NOTE — TELEPHONE ENCOUNTER
Called pt and LVM the following Prescriptions will be filled at [facility] [place] - [place], [place]     oxyCODONE (ROXICODONE) 5 MG/5ML solution   1200 mL   Fill 11/03/22 and start 11/05/22. 30 day supply for chronic pain.    fentaNYL (DURAGESIC) 25 mcg/hr 72 hr patch 15 pat*0        Fill 11/03/22 and start 11/05/22. 30 days    Lyndsay Solo MA

## 2022-11-01 NOTE — TELEPHONE ENCOUNTER
Cyanocabalmin  Routing refill request to provider for review/approval because:  A break in medication, last refill 6/2/2021 for 1 ML and 3 refills    Zofran  Adderall  Ativan  Routing refill request to provider for review/approval because:  Drug not on the G refill protocol     Ella Hammond RN

## 2022-11-08 ENCOUNTER — LAB REQUISITION (OUTPATIENT)
Dept: LAB | Facility: CLINIC | Age: 52
End: 2022-11-08
Payer: COMMERCIAL

## 2022-11-08 DIAGNOSIS — R13.10 DYSPHAGIA, UNSPECIFIED: ICD-10-CM

## 2022-11-08 LAB
ALBUMIN SERPL-MCNC: 3.6 G/DL (ref 3.4–5)
ALP SERPL-CCNC: 63 U/L (ref 40–150)
ALT SERPL W P-5'-P-CCNC: 22 U/L (ref 0–70)
ANION GAP SERPL CALCULATED.3IONS-SCNC: 3 MMOL/L (ref 3–14)
AST SERPL W P-5'-P-CCNC: 13 U/L (ref 0–45)
BASOPHILS # BLD AUTO: 0.1 10E3/UL (ref 0–0.2)
BASOPHILS NFR BLD AUTO: 1 %
BILIRUB DIRECT SERPL-MCNC: 0.1 MG/DL (ref 0–0.2)
BILIRUB SERPL-MCNC: 0.5 MG/DL (ref 0.2–1.3)
BUN SERPL-MCNC: 19 MG/DL (ref 7–30)
CALCIUM SERPL-MCNC: 8.4 MG/DL (ref 8.5–10.1)
CHLORIDE BLD-SCNC: 109 MMOL/L (ref 94–109)
CO2 SERPL-SCNC: 29 MMOL/L (ref 20–32)
CREAT SERPL-MCNC: 0.66 MG/DL (ref 0.66–1.25)
EOSINOPHIL # BLD AUTO: 0.2 10E3/UL (ref 0–0.7)
EOSINOPHIL NFR BLD AUTO: 3 %
ERYTHROCYTE [DISTWIDTH] IN BLOOD BY AUTOMATED COUNT: 13.8 % (ref 10–15)
GFR SERPL CREATININE-BSD FRML MDRD: >90 ML/MIN/1.73M2
GLUCOSE BLD-MCNC: 88 MG/DL (ref 70–99)
HCT VFR BLD AUTO: 34.8 % (ref 40–53)
HGB BLD-MCNC: 11.3 G/DL (ref 13.3–17.7)
IMM GRANULOCYTES # BLD: 0 10E3/UL
IMM GRANULOCYTES NFR BLD: 0 %
LYMPHOCYTES # BLD AUTO: 2.5 10E3/UL (ref 0.8–5.3)
LYMPHOCYTES NFR BLD AUTO: 36 %
MAGNESIUM SERPL-MCNC: 2.1 MG/DL (ref 1.6–2.3)
MCH RBC QN AUTO: 30.5 PG (ref 26.5–33)
MCHC RBC AUTO-ENTMCNC: 32.5 G/DL (ref 31.5–36.5)
MCV RBC AUTO: 94 FL (ref 78–100)
MONOCYTES # BLD AUTO: 1 10E3/UL (ref 0–1.3)
MONOCYTES NFR BLD AUTO: 15 %
NEUTROPHILS # BLD AUTO: 3.1 10E3/UL (ref 1.6–8.3)
NEUTROPHILS NFR BLD AUTO: 45 %
NRBC # BLD AUTO: 0 10E3/UL
NRBC BLD AUTO-RTO: 0 /100
PHOSPHATE SERPL-MCNC: 3.3 MG/DL (ref 2.5–4.5)
PLATELET # BLD AUTO: 189 10E3/UL (ref 150–450)
POTASSIUM BLD-SCNC: 3.6 MMOL/L (ref 3.4–5.3)
PROT SERPL-MCNC: 6.9 G/DL (ref 6.8–8.8)
RBC # BLD AUTO: 3.71 10E6/UL (ref 4.4–5.9)
SODIUM SERPL-SCNC: 141 MMOL/L (ref 133–144)
TRIGL SERPL-MCNC: 70 MG/DL
WBC # BLD AUTO: 6.9 10E3/UL (ref 4–11)

## 2022-11-08 PROCEDURE — 85025 COMPLETE CBC W/AUTO DIFF WBC: CPT | Performed by: SURGERY

## 2022-11-08 PROCEDURE — 82248 BILIRUBIN DIRECT: CPT | Performed by: SURGERY

## 2022-11-08 PROCEDURE — 84478 ASSAY OF TRIGLYCERIDES: CPT | Performed by: SURGERY

## 2022-11-08 PROCEDURE — 80053 COMPREHEN METABOLIC PANEL: CPT | Performed by: SURGERY

## 2022-11-08 PROCEDURE — 83735 ASSAY OF MAGNESIUM: CPT | Performed by: SURGERY

## 2022-11-08 PROCEDURE — 84100 ASSAY OF PHOSPHORUS: CPT | Performed by: SURGERY

## 2022-11-09 ENCOUNTER — LAB (OUTPATIENT)
Dept: LAB | Facility: CLINIC | Age: 52
End: 2022-11-09
Payer: COMMERCIAL

## 2022-11-09 ENCOUNTER — OFFICE VISIT (OUTPATIENT)
Dept: PALLIATIVE MEDICINE | Facility: CLINIC | Age: 52
End: 2022-11-09
Payer: COMMERCIAL

## 2022-11-09 ENCOUNTER — TELEPHONE (OUTPATIENT)
Dept: PALLIATIVE MEDICINE | Facility: CLINIC | Age: 52
End: 2022-11-09

## 2022-11-09 VITALS — DIASTOLIC BLOOD PRESSURE: 73 MMHG | SYSTOLIC BLOOD PRESSURE: 107 MMHG | HEART RATE: 73 BPM

## 2022-11-09 DIAGNOSIS — Z79.891 LONG TERM (CURRENT) USE OF OPIATE ANALGESIC: ICD-10-CM

## 2022-11-09 DIAGNOSIS — G89.4 CHRONIC PAIN SYNDROME: ICD-10-CM

## 2022-11-09 DIAGNOSIS — R19.8 VISCERAL HYPERALGESIA: Primary | ICD-10-CM

## 2022-11-09 DIAGNOSIS — R10.9 CHRONIC ABDOMINAL PAIN: ICD-10-CM

## 2022-11-09 DIAGNOSIS — G89.29 CHRONIC ABDOMINAL PAIN: ICD-10-CM

## 2022-11-09 LAB
CANNABINOIDS UR QL SCN: NORMAL
ETHANOL UR QL SCN: NORMAL

## 2022-11-09 PROCEDURE — 99000 SPECIMEN HANDLING OFFICE-LAB: CPT

## 2022-11-09 PROCEDURE — 80307 DRUG TEST PRSMV CHEM ANLYZR: CPT | Mod: 90

## 2022-11-09 PROCEDURE — 2894A URINE DRUG CONFIRMATION PANEL: CPT | Mod: 59

## 2022-11-09 PROCEDURE — 2894A ETHANOL URINE: CPT

## 2022-11-09 PROCEDURE — 99213 OFFICE O/P EST LOW 20 MIN: CPT | Performed by: NURSE PRACTITIONER

## 2022-11-09 PROCEDURE — 2894A CANNABINOIDS QUALITATIVE URINE: CPT | Mod: 59

## 2022-11-09 RX ORDER — FENTANYL 25 UG/1
1 PATCH TRANSDERMAL
Qty: 15 PATCH | Refills: 0 | Status: SHIPPED | OUTPATIENT
Start: 2022-11-09 | End: 2022-12-23

## 2022-11-09 RX ORDER — OXYCODONE HCL 5 MG/5 ML
5-10 SOLUTION, ORAL ORAL 4 TIMES DAILY PRN
Qty: 1200 ML | Refills: 0 | Status: SHIPPED | OUTPATIENT
Start: 2022-11-09 | End: 2022-12-22

## 2022-11-09 ASSESSMENT — PAIN SCALES - GENERAL: PAINLEVEL: SEVERE PAIN (6)

## 2022-11-09 NOTE — PATIENT INSTRUCTIONS
Plan:   Physical Therapy: none  Clinical Health Psychologist to address issues of relaxation, behavioral change, coping style, and other factors important to improvement: none  Continue gentle movement at home  Diagnostic Studies: none  Medication Management:   Continue fentanyl patch 25mcg/hr every 48 hours, fill 12/1 and start 12/4  Oxycodone liquid 5mg/5ml,  use 5-10mg (5-10mls) every 4 hours as needed, max of 40mg (40ml) per day. Fill 12/1 and start 12/4.   Sent in refill for naloxone for opiate reversal  Further procedures recommended: none  Acupuncture: I am fine with this  Urine toxicology screen today: today, he is wearing the Fentanyl patch and took oxycodone this morning at 9AM  Signed CSA with me today  Recommendations/follow-up for PCP:  See above  Release of information: none  Follow up: 10-12 weeks for in-person visit. Please call [phone] to make your follow-up appointment with me.     ----------------------------------------------------------------  Clinic Number:  [phone]   Call with any questions about your care and for scheduling assistance.   Calls are returned Monday through Friday between 8 AM and 4:30 PM. We usually get back to you within 2 business days depending on the issue/request.    If we are prescribing your medications:  For opioid medication refills, call the clinic or send a [employer] message 7 days in advance.  Please include:  Name of requested medication  Name of the pharmacy.  For non-opioid medications, call your pharmacy directly to request a refill. Please allow 3-4 days to be processed.   Per [place] State Law:  All controlled substance prescriptions must be filled within 30 days of being written.    For those controlled substances allowing refills, pickup must occur within 30 days of last fill.      We believe regular attendance is key to your success in our program!    Any time you are unable to keep your appointment we ask that you call us at least 24 hours in advance to  cancel.This will allow us to offer the appointment time to another patient.   Multiple missed appointments may lead to dismissal from the clinic.

## 2022-11-09 NOTE — TELEPHONE ENCOUNTER
Patient declined to  Narcan prescription.  Copay was $25.91.  I just wanted to make you aware of this since he is on larger opioid doses and I did not know if having the Narcan on hand was part of your pain control contract with [facility].    Thank you,  Nicolette Pardo [facility] Pharmacist  [facility]

## 2022-11-09 NOTE — LETTER
Opioid / Opioid Plus Controlled Substance Agreement    This is an agreement between you and your provider about the safe and appropriate use of controlled substance/opioids prescribed by your care team. Controlled substances are medicines that can cause physical and mental dependence (abuse).    There are strict laws about having and using these medicines. We here at [facility] are committing to working with you in your efforts to get better. To support you in this work, we ll help you schedule regular office appointments for medicine refills. If we must cancel or change your appointment for any reason, we ll make sure you have enough medicine to last until your next appointment.     As a Provider, I will:    Listen carefully to your concerns and treat you with respect.     Recommend a treatment plan that I believe is in your best interest. This plan may involve therapies other than opioid pain medication.     Talk with you often about the possible benefits, and the risk of harm of any medicine that we prescribe for you.     Provide a plan on how to taper (discontinue or go off) using this medicine if the decision is made to stop its use.    As a Patient, I understand that opioid(s):     Are a controlled substance prescribed by my care team to help me function or work and manage my condition(s).     Are strong medicines and can cause serious side effects such as:    Drowsiness, which can seriously affect my driving ability    A lower breathing rate, enough to cause death    Harm to my thinking ability     Depression     Abuse of and addiction to this medicine    Need to be taken exactly as prescribed. Combining opioids with certain medicines or chemicals (such as illegal drugs, sedatives, sleeping pills, and benzodiazepines) can be dangerous or even fatal. If I stop opioids suddenly, I may have severe withdrawal symptoms.    Do not work for all types of pain nor for all patients. If they re not helpful, I may  be asked to stop them.        The risks, benefits and side effects of these medicine(s) were explained to me. I agree that:  1. I will take part in other treatments as advised by my care team. This may be psychiatry or counseling, physical therapy, behavioral therapy, group treatment or a referral to a specialist.     2. I will keep all my appointments. I understand that this is part of the monitoring of opioids. My care team may require an office visit for EVERY opioid/controlled substance refill. If I miss appointments or don t follow instructions, my care team may stop my medicine.    3. I will take my medicines as prescribed. I will not change the dose or schedule unless my care team tells me to. There will be no refills if I run out early.     4. I may be asked to come to the clinic and complete a urine drug test or complete a pill count at any time. If I don t give a urine sample or participate in a pill count, the care team may stop my medicine.    5. I will only receive prescriptions from this clinic for chronic pain. If I am treated by another provider for acute pain issues, I will tell them that I am taking opioid pain medication for chronic pain and that I have a treatment agreement with this provider. I will inform my [facility] care team within one business day if I am given a prescription for any pain medication by another healthcare provider. My [facility] care team can contact other providers and pharmacists about my use of any medicines.    6. It is up to me to make sure that I don t run out of my medicines on weekends or holidays. If my care team is willing to refill my opioid prescription without a visit, I must request refills only during office hours. Refills may take up to 3 business days to process. I will use one pharmacy to fill all my opioid and other controlled substance prescriptions. I will notify the clinic about any changes to my insurance or medication  availability.    7. I am responsible for my prescriptions. If the medicine/prescription is lost, stolen or destroyed, it will not be replaced. I also agree not to share controlled substance medicines with anyone.    8. I am aware I should not use any illegal or recreational drugs. I agree not to drink alcohol unless my care team says I can.       9. If I enroll in the [place] [facility] Cannabis program, I will tell my care team prior to my next refill.     10. I will tell my care team right away if I become pregnant, have a new medical problem treated outside of my regular clinic, or have a change in my medications.    11. I understand that this medicine can affect my thinking, judgment and reaction time. Alcohol and drugs affect the brain and body, which can affect the safety of my driving. Being under the influence of alcohol or drugs can affect my decision-making, behaviors, personal safety, and the safety of others. Driving while impaired (DWI) can occur if a person is driving, operating, or in physical control of a car, motorcycle, boat, snowmobile, ATV, motorbike, off-road vehicle, or any other motor vehicle ([place] Statute 169A.20). I understand the risk if I choose to drive or operate any vehicle or machinery.    I understand that if I do not follow any of the conditions above, my prescriptions or treatment may be stopped or changed.          Opioids  What You Need to Know    What are opioids?   Opioids are pain medicines that must be prescribed by a doctor. They are also known as narcotics.     Examples are:   1. morphine (MS Contin, Do)  2. oxycodone (Oxycontin)  3. oxycodone and acetaminophen (Percocet)  4. hydrocodone and acetaminophen (Vicodin, Norco)   5. fentanyl patch (Duragesic)   6. hydromorphone (Dilaudid)   7. methadone  8. codeine (Tylenol #3)     What do opioids do well?   Opioids are best for severe short-term pain such as after a surgery or injury. They may work well for cancer pain. They may  help some people with long-lasting (chronic) pain.     What do opioids NOT do well?   Opioids never get rid of pain entirely, and they don t work well for most patients with chronic pain. Opioids don t reduce swelling, one of the causes of pain.                                    Other ways to manage chronic pain and improve function include:       Treat the health problem that may be causing pain    Anti-inflammation medicines, which reduce swelling and tenderness, such as ibuprofen (Advil, Motrin) or naproxen (Aleve)    Acetaminophen (Tylenol)    Antidepressants and anti-seizure medicines, especially for nerve pain    Topical treatments such as patches or creams    Injections or nerve blocks    Chiropractic or osteopathic treatment    Acupuncture, massage, deep breathing, meditation, visual imagery, aromatherapy    Use heat or ice at the pain site    Physical therapy     Exercise    Stop smoking    Take part in therapy       Risks and side effects     Talk to your doctor before you start or decide to keep taking opioids. Possible side effects include:      Lowering your breathing rate enough to cause death    Overdose, including death, especially if taking higher than prescribed doses    Worse depression symptoms; less pleasure in things you usually enjoy    Feeling tired or sluggish    Slower thoughts or cloudy thinking    Being more sensitive to pain over time; pain is harder to control    Trouble sleeping or restless sleep    Changes in hormone levels (for example, less testosterone)    Changes in sex drive or ability to have sex    Constipation    Unsafe driving    Itching and sweating    Dizziness    Nausea, throwing up and dry mouth    What else should I know about opioids?    Opioids may lead to dependence, tolerance, or addiction.      Dependence means that if you stop or reduce the medicine too quickly, you will have withdrawal symptoms. These include loose poop (diarrhea), jitters, flu-like symptoms,  nervousness and tremors. Dependence is not the same as addiction.                       Tolerance means needing higher doses over time to get the same effect. This may increase the chance of serious side effects.      Addiction is when people improperly use a substance that harms their body, their mind or their relations with others. Use of opiates can cause a relapse of addiction if you have a history of drug or alcohol abuse.      People who have used opioids for a long time may have a lower quality of life, worse depression, higher levels of pain and more visits to doctors.    You can overdose on opioids. Take these steps to lower your risk of overdose:    1. Recognize the signs:  Signs of overdose include decrease or loss of consciousness (blackout), slowed breathing, trouble waking up and blue lips. If someone is worried about overdose, they should call 911.    2. Talk to your doctor about Narcan (naloxone).   If you are at risk for overdose, you may be given a prescription for Narcan. This medicine very quickly reverses the effects of opioids.   If you overdose, a friend or family member can give you Narcan while waiting for the ambulance. They need to know the signs of overdose and how to give Narcan.     3. Don't use alcohol or street drugs.   Taking them with opioids can cause death.    4. Do not take any of these medicines unless your doctor says it s OK. Taking these with opioids can cause death:    Benzodiazepines, such as lorazepam (Ativan), alprazolam (Xanax) or diazepam (Valium)    Muscle relaxers, such as cyclobenzaprine (Flexeril)    Sleeping pills like zolpidem (Ambien)     Other opioids      How to keep you and other people safe while taking opioids:    1. Never share your opioids with others.  Opioid medicines are regulated by the Drug Enforcement Agency ([employer]). Selling or sharing medications is a criminal act.    2. Be sure to store opioids in a secure place, locked up if possible. Young children  can easily swallow them and overdose.    3. When you are traveling with your medicines, keep them in the original bottles. If you use a pill box, be sure you also carry a copy of your medicine list from your clinic or pharmacy.    4. Safe disposal of opioids    Most pharmacies have places to get rid of medicine, called disposal kiosks. Medicine disposal options are also available in every [facility]. Search your county and  medication disposal  to find more options. You can find more details at:  [email or web address]     I agree that my provider, clinic care team, and pharmacy may work with any city, state or federal law enforcement agency that investigates the misuse, sale, or other diversion of my controlled medicine. I will allow my provider to discuss my care with, or share a copy of, this agreement with any other treating provider, pharmacy or emergency room where I receive care.    I have read this agreement and have asked questions about anything I did not understand.    _______________________________________________________  Patient Signature - [facility] Acevedo _____________________                   Date     _______________________________________________________  Provider Signature - SAILAJA Johnson CNP   _____________________                   Date     _______________________________________________________  Witness Signature (required if provider not present while patient signing)   _____________________                   Date

## 2022-11-09 NOTE — PROGRESS NOTES
[facility]    11/9/2022    Chief complaint:  Ongoing abdominal pain  His recheck on Hep B was negative.       Interval history:  Parker Acevedo is a 52 year old male is known to me for:  Visceral hyperalgesia  Chronic abdominal pain  Chronic pain syndrome  PMHx includes: ADHD, anxiety, cardiomyopathy and nutritional diseases, chronic abdominal pain, central line associated bloodstream infection recurrent, anesthesia complication, difficulty swallowing, gastric ulcer unspecified as acute or chronic without mention of hemorrhage perforation or obstruction, gastroesophageal reflux disease, head injury, hiatal hernia, other bladder disorder, other chronic pain, postop nausea and vomiting, severe malnutrition on TPN, short gut syndrome, tobacco abuse  PSHx includes: Appendectomy, back surgery (11/3/2014), biopsy of cervical lymph node (2/20/2015), cholecystectomy, colonoscopy (2021, 2022), cervical anterior 1 level discectomy and fusion (2/15/2017), endoscopic insertion tube gastrostomy (9/9/2013), endoscopic ultrasound upper gastrointestinal tract (4/29/2011), endoscopic ultrasound upper gastrointestinal tract (9/9/2013), endoscopic ultrasound upper gastrointestinal tract (2/24/2021), endoscopy (3/25/2011, 8/4/2009, 1/5/2009), multiple EGDs, gastrectomy (6/22/2012), gastrojejunostomy (8/26/2009), umbilical hernia repair (2006), hernia raphe hiatal (6/22/2012), herniorrhaphy inguinal (11/22/2013), insert PICC line on the right (12/19/2019), insert PICC line right (2/21/2020), insert vascular access port on the right (12/19/2017, 8/2/2018), multiple interventional radiology CVC tunnel placement and removals, exploratory laparotomy (11/22/2013), orthopedic surgery, Celestino-en-Y gastric bypass (2003), tonsillectomy.        Recommendations/plan at the last visit on 8/23/2022 included:  1.  Physical Therapy: none  2. Clinical Health Psychologist to address issues of relaxation, behavioral  change, coping style, and other factors important to improvement: none  3. Continue gentle movement at home  4. Diagnostic Studies: none  5. Medication Management:   1. Continue fentanyl patch 25mcg/hr every 48 hours, fill 9/2 and start 9/6, early fill due to out of town travel  2. Oxycodone liquid 5mg/5ml,  use 5-10mg (5-10mls) every 4 hours as needed, max of 40mg (40ml) per day. Fill 9/2 and start 9/6. Early fill due to out of town travel  6. Further procedures recommended: nne  7. Acupuncture: I am fine with this  8. Urine toxicology screen today: none  9. Recommendations/follow-up for PCP:  See above  10. Release of information: none  11. Follow up: 10-12 weeks for in-person visit. Please call [phone] to make your follow-up appointment with me.         Since his last visit, Parker Acevedo reports:    Interval history November 9, 2022  -Parker has ongoing abdominal pain  -he relates that the recheck for Hep B was negative  -he is stable on his current regimen of fentanyl patch and oxycodone liquid      Interval history August 23, 2022  -abdominal pain, pretty bad today, ID will recheck him for Hep B and are doing some updated imaging.   -insurance adjusters at his home now for floyd issues  -left knee pain after slip and fall, seen in ER on 8/9/2022 and in immobilizer, will follow up with ortho.     Pain history collected at initial visit on 5/27/2022  He had gastric bypass in 2003 and about 5 years after that, he developed gastric ulcers. He ended up having surgery for gastric ulcer and hernias and such. He has had over 11 surgeries for his abdomen. He is malnourished due to short-gut syndrome. He has a J-tube that is open to vent bile from his stomach as he gets bloated.   Worst pain is inside the abdominal cavity. He will have pain so bad that he states he screams for his mother. He feels that this is a deep inside pain.   -he would like to increase his oxycodone dose by one dose per day. His activity  "is limited by not having medication to cover him for his daily activities.      -he has a DVT in his right arm and in his right jugular vein. He is on lovenox.         At this point, the patient's participation with our multidisciplinary team includes:  The patient has been compliant with the program.  PT - none  Health Psych - none      Pain scores:  Pain intensity on average is 4-5 on a scale of 0-10.    Range is 2-10+/10. He will get to a 10+ about 6 to 7 times per week. He thinks this may be about eating stuff he probably shouldn't per patient report.   Pain right now is 6/10.   Pain is described as \"sharp, burning, agonizing and like on fire or like pirahnas gnawing me inside out.\"    Pain is constant in nature    Current pain relevant medications:   -tylenol 32mg/ml liquid take 15.65mls (500mg) Q 4 hours PRN fever/mild pain  (somewhat helpful for headaches)  --Duragesic 25mcg/hr Q 48 hours (helpful)  -lidoderm 5% patch PRN ()  -Narcan nasal spray for opiate reversal   -Oxycodone 5mg/5ml solution take 5-10mls (5-10mg) TID PRN max of 30mg/day with 4 hours between doses.      Other pertinent medications:  -Adderall 20mg every day  -LOVENOX 120mg Subcutaneously every day  -lorazepam 1mg for anxiety with TPN and meds once per day (uses most nights)  -Zofran 8mg Q 8 hours PRN     Previous medication treatments included:  OPIATES: Fentanyl (helpful), morphine (hallucinations), Dilaudid (not helpful, did not dissolve), Tylenol #3 (not helpful), hydrocodone (not helpful), Belbuca (not helpful--he had a really heavy chest feeling)  NSAIDS: cannot take due to severe gastric ulcer disease  MUSCLE RELAXANTS: none  ANTI-MIGRAINE MEDS: none  ANTI-DEPRESSANTS: none  SLEEP AIDS: benadryl (helpful)  ANTI-CONVULSANTS: gabapentin (bad headaches and acid reflux),  TOPICALS: Lidocaine patches (helpful)  ANXIOLYTICS: valium (helpful 5mg dosage), lorazepam (helpful)  MEDICAL CANNABIS: not interested  Other meds: tylenol (helpful for " headaches)        Other treatments have included:  Parker Acevedo has been seen at a pain clinic in the past.  Previously managed here by Dr. Jessica Milligan. Also [facility] by [facility] for possible implanted intrathecal pain pump, he is not candidate due to infection risk.   PT: tried, never helped his neck or abdominal surgery  Massage Therapy: helpful for a day or two  Chiropractic care: tried, helped some   Acupuncture: tried, not helpful  TENs Unit: tried, not helpful  Healing Touch: not helpful     Injections:   -previously had injections for his neck with Dr. Jessica Milligan        THE 4 A's OF OPIOID MAINTENANCE ANALGESIA    Analgesia: keeps pain pretty managable    Activity: he walks daily, light housekeeping    Adverse effects: none    Adherence to Rx protocol: yes      Side Effects: no side effect  Patient is using the medication as prescribed: YES    Medications:  Current Outpatient Medications   Medication Sig Dispense Refill     albuterol (PROAIR HFA/PROVENTIL HFA/VENTOLIN HFA) 108 (90 Base) MCG/ACT inhaler Inhale 2 puffs into the lungs every 4 hours       albuterol (PROAIR HFA/PROVENTIL HFA/VENTOLIN HFA) 108 (90 Base) MCG/ACT inhaler Inhale 2 puffs into the lungs every 6 hours as needed 18 g 1     amphetamine-dextroamphetamine (ADDERALL) 20 MG tablet TAKE ONE TABLET BY MOUTH ONCE DAILY 30 tablet 0     carvedilol (COREG) 6.25 MG tablet Take 2 tablets (12.5 mg) by mouth 2 times daily (with meals) 60 tablet 3     cyanocobalamin (CYANOCOBALAMIN) 1000 MCG/ML injection INJECT 1 ML INTO THE MUSCLE EVERY 30 DAYS 1 mL 3     enoxaparin ANTICOAGULANT (LOVENOX) 80 MG/0.8ML syringe INJECT 0.8 MLS SUBCUTANEOUS 2 TIMES DAILY 48 mL 0     EPINEPHrine (ANY BX GENERIC EQUIV) 0.3 MG/0.3ML injection 2-pack        fentaNYL (DURAGESIC) 25 mcg/hr 72 hr patch Place 1 patch onto the skin every 48 hours remove old patch. Fill 11/03/22 and start 11/05/22. 30 days for chronic pain. 15 patch 0     hydrOXYzine (VISTARIL) 25 MG capsule  "25-50mg at night 30 capsule 1     insulin syringe-needle U-100 (29G X 1/2\" 1 ML) 29G X 1/2\" 1 ML miscellaneous Use to inject b12 every 30 days 3 each 4     lactated ringers infusion Inject 1,000-2,000 mLs into the vein daily as needed       lidocaine (LIDODERM) 5 % patch Place 1-2 patches onto the skin every 24 hours Wear for 12 hours, remove for 12 hours.  OK to cut to better fit to size. 60 patch 6     LORazepam (ATIVAN) 1 MG tablet TAKE ONE TABLET BY MOUTH ONCE DAILY AS NEEDED FOR ANXIETY WITH TPN AND MEDICATIONS (#30 TO LAST 30 DAYS) 30 tablet 0     multivitamin, therapeutic (THERA-VIT) TABS tablet Take 1 tablet by mouth daily       naloxone (NARCAN) 4 MG/0.1ML nasal spray Spray 1 spray (4 mg) into one nostril alternating nostrils once as needed for opioid reversal every 2-3 minutes until assistance arrives 0.2 mL 0     nystatin (MYCOSTATIN) 720244 UNIT/GM external cream Apply topically 2 times daily 30 g 0     ondansetron (ZOFRAN ODT) 8 MG ODT tab DISSOLVE ONE TABLET ON TONGUE EVERY 8 HOURS AS NEEDED FOR NAUSEA 90 tablet 1     oxyCODONE (ROXICODONE) 5 MG/5ML solution Take 5-10 mLs (5-10 mg) by mouth 4 times daily as needed for pain Max of 40mg/day. Put at least 4 hours between doses. Fill 11/03/22 and start 11/05/22. 30 day supply for chronic pain. 1200 mL 0     pantoprazole (PROTONIX) 40 MG EC tablet Take 1 tablet (40 mg) by mouth daily 30 tablet 5     parenteral nutrition - PTA/DISCHARGE ORDER Patient receives 2050 mL TPN every 14 hours through PICC at [facility].       polyethylene glycol (MIRALAX) 17 GM/Dose powder Take 17 g by mouth daily 1020 g 3     sucralfate (CARAFATE) 1 GM/10ML suspension TAKE 10MLS  BY MOUTH FOUR TIMES A DAY AS NEEDED 420 mL 1     vitamin D2 (ERGOCALCIFEROL) 93533 units (1250 mcg) capsule TAKE 1 CAPSULE (50,000 UNITS) BY MOUTH ONCE A WEEK 12 capsule 3     diphenhydrAMINE (BENADRYL) 50 MG capsule Take 50 mg by mouth 2 times daily (Patient not taking: Reported on 11/9/2022) " "      [place] [facility] PATIENT Contact [facility] for patient specific medication information at [phone] on admission and discharge from the hospital.  Phones are answered 24 hours a day 7 days a week 365 days a year.    Providers - Choose \"CONTINUE HOME MED (no script)\" at discharge if patient treatment with home infusion will continue.         Medical History: any changes in medical history since they were last seen? No    Social History:   Home situation:  to Vandana, one son in late 20's   Occupation/Schooling: he used to work at [employer]. He is on disability, SSDI  Tobacco use: smokes 1/2 ppd, discussed smoking cessation today, he is not ready at present time  Alcohol use: none  Drug use: none  History of chemical dependency treatment: none    Is patient a current smoker or tobacco user?  1/2 ppd  If yes, was cessation counseling offered?  yes          Physical Exam:  Vital signs: Blood pressure 107/73, pulse 73.    Behavioral observations:  Awake, alert and cooperative    Gait:  normal    Musculoskeletal exam:  Strength grossly equal throughout    Neuro exam:  deferred    Skin/vascular/autonomic:  No suspicious lesions on exposed skin.     Other:  na    Is the patient hypertensive today? no  Hypertensive on recheck of BP?   na  If yes, was patient recommended to see Primary Care Provider in follow up for management of HTN?  na             Diagnostic tests:  CT ABDOMEN PELVIS W CONTRAST  2/16/2019 3:00 AM      HISTORY: Right-sided abdominal pain, status post gastric bypass.  Patient has G-tube with blood and history of ulcers.     TECHNIQUE: CT abdomen and pelvis with 85 mL Isovue-370 IV. Radiation  dose for this scan was reduced using automated exposure control,  adjustment of the mA and/or kV according to patient size, or iterative  reconstruction technique.     COMPARISON: 1/13/2015.     FINDINGS:  Abdomen: There is mild dependent atelectasis at the lung bases. " The  heart size is normal. Small hiatal hernia. There is mild biliary  dilatation into the pancreas head which is probably normal post  cholecystectomy. The liver otherwise appears normal. The gallbladder  is absent. The spleen, pancreas, adrenal glands and kidneys are normal  in appearance. There is a G-tube in place. No abdominal or pelvic  lymph node enlargement.     Pelvis: The ascending colon and transverse colon are very distended  with gas, stool and fluid. The descending and rectosigmoid colon are  nondilated. Transition point is in the region of the splenic flexure,  but there is no convincing obstruction. Small bowel is normal in  caliber. The appendix is normal. There is no free intraperitoneal gas  or fluid.                                                                      IMPRESSION:  1. The ascending and transverse colon are distended with gas, stool  and fluid. Distal colon is nondilated. No focal obstruction is  evident.  2. Small hiatal hernia.     IMANI HU MD        MR CERVICAL SPINE WITHOUT CONTRAST  1/2/2017  2:44 PM     HISTORY: Injury to neck 2 weeks ago with development of suspected  radicular pain to the left upper extremity with weakness of thumb and  index finger.     COMPARISON: Plain films 12/22/2016.     TECHNIQUE: Routine MR cervical spine extended through T2.     FINDINGS: Vertebral body bone marrow signal is normal and the  alignment is normal through T2. No malignant or destructive changes.  The cervical and upper thoracic spinal cord appear intrinsically  normal through T2. Craniocervical junction region is normal. No  paraspinous soft tissue abnormality.     Findings by level as follows:     C2-C3: Negative. No disc protrusion. No central or lateral stenosis.     C3-C4: Negative.     C4-C5: Very tiny central disc protrusion. No significant central or  lateral stenosis.     C5-C6: Moderate degenerative narrowing of the interspace. Mild  broad-based disc osteophyte complex  and small uncinate spurs. Minimal  central stenosis and minimal bilateral foraminal stenosis.     C6-C7: Moderate degenerative narrowing of the interspace. No disc  protrusion. No central or lateral stenosis.     C7-T1: Negative.                                                                      IMPRESSION:  1. Degenerative changes as described, most marked at C5-C6 and C6-C7.  2. No intrinsic cord abnormality through T2.     MARTHA MCCARTY MD           MR LUMBAR SPINE W/O & W CONTRAST 1/6/2019 3:49 PM     Provided History: Back pain, > 6wks conservative tx, persistent sx;  pain in the left paraspinal region- now with fungemia, persistent  blood stream infections since Oct 2018.     Comparison: No similar studies     Technique: Sagittal T1-weighted and T2-weighted and axial T2-weighted  images of the lumbar spine were obtained without intravenous contrast.  Post intravenous contrast using gadolinium axial and sagittal  T1-weighted images were obtained with fat saturation.     Contrast: 8.9CC GADAVIST     Findings: Regarding numbering convention, there are 5 lumbar-type  vertebrae assumed for the purposes of this dictation.  The tip of the  conus medullaris is at L1.  Regarding alignment, the lumbar vertebral  column appears normally aligned.  There is no significant disc height  narrowing at any level.  Regarding bone marrow signal intensity, no  abnormality is visualized on STIR images.     On a level by level basis:     L1-2: No significant spinal canal or foraminal narrowing.     L2-3: Minimal disc bulge. Mild thickening of the ligamentum flavum. No  significant spinal canal or foraminal narrowing.     L3-4: Mild disc bulge and thickening of the ligamentum flavum with  mild spinal canal narrowing. No neural foraminal narrowing.     L4-5: Mild disc bulge and thickening of the ligamentum flavum. No  central spinal canal narrowing or neuroforaminal stenosis.      L5-S1: No significant spinal canal or foraminal  narrowing.     3 bandlike areas of high STIR signal and enhancement in the right  posterior paraspinous muscles.                                                                      Impression:  1. No abnormal signal within the spine to suggest fungal infection.  Mild nonspecific enhancement and STIR hyperintensity in the right  posterior paraspinous muscles, potentially myositis.  2. Multilevel lumbar spondylosis.     I have personally reviewed the examination and initial interpretation  and I agree with the findings.     YOLA TELLEZ MD           Other testing (labs, diagnostics):  5/25/2022  Cr. 0.69  Est GFR >90        Screening tools:      DIRE Score for ongoing opioid management is calculated as follows:     Diagnosis = 2     Intractability = 2     Risk: Psych = 3  Chem Hlth = 2  Reliability = 2  Social = 2     Efficacy = 2     Total DIRE Score = 15 (14 or higher predicts good candidate for ongoing opioid management; 13 or lower predicts poor candidate for opioid management)         [place] Pres[place] Monitoring [place]:  [place]ed [place]  11/9/2022- no concerning fills.  Natalie MENARD RN CNP, FNP  [facility]  [place] location          Assessment:   1. Visceral hyperalgesia  2. Chronic abdominal pain  3. Chronic pain syndrome  4. Long germ current use of opiate analgesic  5. PMHx includes: ADHD, anxiety, cardiomyopathy and nutritional diseases, chronic abdominal pain, central line associated bloodstream infection recurrent, anesthesia complication, difficulty swallowing, gastric ulcer unspecified as acute or chronic without mention of hemorrhage perforation or obstruction, gastroesophageal reflux disease, head injury, hiatal hernia, other bladder disorder, other chronic pain, postop nausea and vomiting, severe malnutrition on TPN, short gut syndrome, tobacco abuse  6. PSHx includes: Appendectomy, back surgery (11/3/2014), biopsy of cervical lymph node (2/20/2015),  cholecystectomy, colonoscopy (2021, 2022), cervical anterior 1 level discectomy and fusion (2/15/2017), endoscopic insertion tube gastrostomy (9/9/2013), endoscopic ultrasound upper gastrointestinal tract (4/29/2011), endoscopic ultrasound upper gastrointestinal tract (9/9/2013), endoscopic ultrasound upper gastrointestinal tract (2/24/2021), endoscopy (3/25/2011, 8/4/2009, 1/5/2009), multiple EGDs, gastrectomy (6/22/2012), gastrojejunostomy (8/26/2009), umbilical hernia repair (2006), hernia raphe hiatal (6/22/2012), herniorrhaphy inguinal (11/22/2013), insert PICC line on the right (12/19/2019), insert PICC line right (2/21/2020), insert vascular access port on the right (12/19/2017, 8/2/2018), multiple interventional radiology CVC tunnel placement and removals, exploratory laparotomy (11/22/2013), orthopedic surgery, Celestino-en-Y gastric bypass (2003), tonsillectomy.        Plan:   1. Physical Therapy: none  2. Clinical Health Psychologist to address issues of relaxation, behavioral change, coping style, and other factors important to improvement: none  3. Continue gentle movement at home  4. Diagnostic Studies: none  5. Medication Management:   1. Continue fentanyl patch 25mcg/hr every 48 hours, fill 12/1 and start 12/4  2. Oxycodone liquid 5mg/5ml,  use 5-10mg (5-10mls) every 4 hours as needed, max of 40mg (40ml) per day. Fill 12/1 and start 12/4.   3. Sent in refill for naloxone for opiate reversal  6. Further procedures recommended: none  7. Acupuncture: I am fine with this  8. Urine toxicology screen today: today, he is wearing the Fentanyl patch and took oxycodone this morning at 9AM  9. Signed CSA with me today  10. Recommendations/follow-up for PCP:  See above  11. Release of information: none  12. Follow up: 10-12 weeks for in-person visit. Please call [phone] to make your follow-up appointment with me.     Face to face time: 23 minutes            Natalie MENARD, RN CNP, FNP  [facility]  [facility] [place]

## 2022-11-10 LAB
CREAT UR-MCNC: 98 MG/DL
ETHYL GLUCURONIDE UR QL SCN: NEGATIVE NG/ML

## 2022-11-15 LAB
AMPHET UR CFM-MCNC: ABNORMAL NG/ML
AMPHET/CREAT UR: ABNORMAL
FENTANYL UR CFM-MCNC: 10 NG/ML
FENTANYL/CREAT UR: 10 NG/MG {CREAT}
LORAZEPAM UR QL CFM: PRESENT
NORFENTANYL UR CFM-MCNC: 41 NG/ML
NORFENTANYL/CREAT UR: 42 NG/MG {CREAT}
OXYCODONE UR CFM-MCNC: 1600 NG/ML
OXYCODONE/CREAT UR: 1633 NG/MG {CREAT}
OXYMORPHONE UR CFM-MCNC: 2100 NG/ML
OXYMORPHONE/CREAT UR: 2143 NG/MG {CREAT}

## 2022-11-18 ENCOUNTER — MYC MEDICAL ADVICE (OUTPATIENT)
Dept: INTERNAL MEDICINE | Facility: CLINIC | Age: 52
End: 2022-11-18

## 2022-11-18 DIAGNOSIS — F90.0 ADHD (ATTENTION DEFICIT HYPERACTIVITY DISORDER), INATTENTIVE TYPE: ICD-10-CM

## 2022-11-18 DIAGNOSIS — Z98.84 GASTRIC BYPASS STATUS FOR OBESITY: ICD-10-CM

## 2022-11-18 RX ORDER — DEXTROAMPHETAMINE SACCHARATE, AMPHETAMINE ASPARTATE, DEXTROAMPHETAMINE SULFATE AND AMPHETAMINE SULFATE 5; 5; 5; 5 MG/1; MG/1; MG/1; MG/1
TABLET ORAL
Qty: 30 TABLET | Refills: 0 | Status: SHIPPED | OUTPATIENT
Start: 2022-11-18 | End: 2022-12-22

## 2022-11-18 RX ORDER — SUCRALFATE ORAL 1 G/10ML
SUSPENSION ORAL
Qty: 420 ML | Refills: 1 | Status: SHIPPED | OUTPATIENT
Start: 2022-11-18 | End: 2023-04-21

## 2022-11-21 DIAGNOSIS — I82.90 VTE (VENOUS THROMBOEMBOLISM): ICD-10-CM

## 2022-11-21 RX ORDER — ENOXAPARIN SODIUM 100 MG/ML
INJECTION SUBCUTANEOUS
Qty: 67.2 ML | Refills: 0 | Status: SHIPPED | OUTPATIENT
Start: 2022-11-21 | End: 2023-02-22

## 2022-11-21 NOTE — TELEPHONE ENCOUNTER
Pending Prescriptions:                       Disp   Refills    enoxaparin ANTICOAGULANT (LOVENOX) 80 MG/0*67.2 mL0        Sig: INJECT THE CONTENTS OF ONE SYRINGE (80MG) UNDER THE           SKIN TWO TIMES A DAY    Routing refill request to provider for review/approval because:  Drug not on the [facility] refill protocol

## 2022-11-22 NOTE — PROGRESS NOTES
This is a recent snapshot of the patient's [facility] medical record.  For current drug dose and complete information and questions, call [phone] or In Basket pool, [facility] home infusion ([id number])  CSN Number:  [id number]

## 2022-11-23 NOTE — PROGRESS NOTES
This is a recent snapshot of the patient's [facility] medical record.  For current drug dose and complete information and questions, call [phone] or In Basket pool, [facility] home infusion ([id number])  CSN Number:  [id number]

## 2022-11-24 NOTE — PROGRESS NOTES
[facility], [facility] Progress Note - Hospitalist Service, Gold 8       Date of Admission:  7/28/2020  Assessment & Plan    Mr. Acevedo is a 50yo M with PMHx notable for s/p bariatric surgery on chronic TPN, recurrent CLABSI, gastric ulcer, short gut syndrome, here for 1 week of fever, chills, and night sweats, concerning for central line infection and bacteremia.      Changes Today:  - Blood cultures 7/26 growing MRSE from Cm, repeat blood culture 7/28 on admission with GPCs, speciation pending  - Cm port removed IR today 7/30, port tip sent for culture, continue to follow  - Pt having difficulty with PIVs, has required frequent replacement; unfortunately can only ran IV Vanc via central line or PIV, and is on central line holiday, so needs to continue having PIVs replaced for now until central access attained again  - Discussed with IR if they could try to replace port on Saturday afternoon to expedite discharge, but not able to be done until Monday 8/3  - RNCC confirmed patient does have coverage for IV antibiotics; can theoretically discharge following port replacement Monday 8/3 with plan to complete 7days total IV Vanco from date of port reval (7/30-8/6)      # MRSE Bacteremia  # Hx recurrent polymicrobial bacteremia  - Blood cultures 7/26 growing MRSE from Cm, repeat blood culture 7/28 on admission with GPCs, speciation pending  - TTE without vegetations on 7/29   - Cm port removed IR  7/30, port tip sent for culture, continue to follow  - Pt having difficulty with PIVs, has required frequent replacement; unfortunately can only ran IV Vanc via central line or PIV, and is on central line holiday, so needs to continue having PIVs replaced for now until central access attained again  - Discussed with IR if they could try to replace port on Saturday afternoon to expedite discharge, but not able to be done until Monday 8/3  - RNCC confirmed patient does  have coverage for IV antibiotics; can theoretically discharge following port replacement Monday 8/3 with plan to complete 7days total IV Vanco from date of port reval (7/30-8/6)     # Hx Celestino-en-Y gastric bypass, esophagojejunostomy c/b short gut syndrome and chronic malnutrition  # Chronic Abdominal Pain:  Patient on TPN, typically through Cm. RD consulted, TPN orders initiated, transitioned to PPN when central access removed.   - RD consulted, appreciate recs, RD transitioned TPN to PPN while no central access on 7/30  - PTA GI cocktail TID prn ordered  - PPI daily  - Continue PTA Carafate  - Continue PTA bowel regimen   - Increase prn Oxycodone to QID (home dose is BID) given acute pain while hospitalized, will discharge on PTA home opioid regimen  - Continue PTA Fentanyl patch     Chronic medical problems  #COPD  -Continue home albuterol PRN     #ADHD  -Continue home adderall 20mg Daily     #GERD  -Continue home pantoprazole 40mg daily     #Cardiomyopathy with reduced EF  -Coreg 6.25mg BID       Diet: NPO for Medical/Clinical Reasons Except for: Meds, Ice Chips  parenteral nutrition - Clinimix E    DVT Prophylaxis: Low Risk/Ambulatory with no VTE prophylaxis indicated  Sanchez Catheter: not present  Code Status: Full Code           Disposition Plan   Expected discharge: 2 - 3 days, recommended to prior living arrangement once antibiotic plan established and SIRS/Sepsis treated.  Entered: Jael Zimmerman MD 07/31/2020, 8:55 AM       The patient's care was discussed with the Bedside Nurse and Patient.    Jael Zimmerman MD  Hospitalist Service, [address]  Pager: [phone]  Please see sticky note for cross cover information  ______________________________________________________________________    Interval History   No acute events overnight. Feeling much better. Biggest difficulty is that PIVs keep infiltrating or coming out and he has to have them replaced, and  he has poor access sites so this is quite difficult. Also feels that getting the IV vanc via a PIV is quite painful and he Is struggling to tolerate. Also requesting that we adjust his diet so he can drink coffee, etc which is what he does at home. Denies fevers, chills, sweats, pain at port site.     Data reviewed today: I reviewed all medications, new labs and imaging results over the last 24 hours.    Physical Exam   Vital Signs: Temp: 97.3  F (36.3  C) Temp src: Oral BP: 122/73 Pulse: 54   Resp: 16 SpO2: 95 % O2 Device: None (Room air)    Weight: 206 lbs 6.4 oz  GEN: pleasant, no acute distress, sitting up in bed  HEENT: no icterus, MMM  CV: RRR, normal s1,s2, no murmurs/rubs no heave. JVP not visible, azevedo removed  CHEST: clear to ausculation bilaterally, no rales or wheezing  ABD:  normal active bowel sounds, tTP epigastric region with voluntary guarding  EXTR: DP 2+ bilaterally . No clubbing, cyanosis or edema.   NEURO: alert oriented, speech fluent/appropriate, motor grossly nonfocal    Data    No

## 2022-11-25 ENCOUNTER — TELEPHONE (OUTPATIENT)
Dept: INTERNAL MEDICINE | Facility: CLINIC | Age: 52
End: 2022-11-25

## 2022-11-25 NOTE — TELEPHONE ENCOUNTER
Reason for Call:  Form, our goal is to have forms completed with 72 hours, however, some forms may require a visit or additional information.    Type of letter, form or note:  Home Health Certification    Who is the form from?: Home care    Where did the form come from: form was faxed in    What clinic location was the form placed at?: [facility]    Where the form was placed: Given to MA/CHRISTY Rivera    What number is listed as a contact on the form?: [phone]       Additional comments: [facility]     Call taken on 11/25/2022 at 7:48 AM by Kristin Jaimes

## 2022-11-30 ENCOUNTER — PATIENT OUTREACH (OUTPATIENT)
Dept: CARE COORDINATION | Facility: CLINIC | Age: 52
End: 2022-11-30

## 2022-11-30 NOTE — PROGRESS NOTES
Clinic Care Coordination Contact  [facility]/Voicemail       Clinical Data: Care Coordinator Outreach  Outreach attempted x 1.  Left message on patient's voicemail with call back information and requested return call.  Plan: Care Coordinator sent care coordination introduction letter on 5/20/2022 via [employer]. Care Coordinator will try to reach patient again in 10 business days.    Kinsey Muhammad RN Care Coordination   [facility]ove  Email: [email or web address]  Phone: [phone]

## 2022-12-01 ENCOUNTER — MYC MEDICAL ADVICE (OUTPATIENT)
Dept: INTERNAL MEDICINE | Facility: CLINIC | Age: 52
End: 2022-12-01

## 2022-12-01 DIAGNOSIS — I82.B23: ICD-10-CM

## 2022-12-01 DIAGNOSIS — I82.90 VTE (VENOUS THROMBOEMBOLISM): Primary | ICD-10-CM

## 2022-12-01 DIAGNOSIS — F90.0 ADHD (ATTENTION DEFICIT HYPERACTIVITY DISORDER), INATTENTIVE TYPE: ICD-10-CM

## 2022-12-01 RX ORDER — LORAZEPAM 1 MG/1
TABLET ORAL
Qty: 30 TABLET | Refills: 0 | Status: SHIPPED | OUTPATIENT
Start: 2022-12-01 | End: 2023-01-13

## 2022-12-01 NOTE — PROGRESS NOTES
This is a recent snapshot of the patient's [facility] medical record.  For current drug dose and complete information and questions, call [phone] or In Basket pool, [facility]v home infusion ([id number])  CSN Number:  [id number]

## 2022-12-02 DIAGNOSIS — Z53.9 DIAGNOSIS NOT YET DEFINED: Primary | ICD-10-CM

## 2022-12-02 PROCEDURE — G0179 MD RECERTIFICATION HHA PT: HCPCS | Performed by: INTERNAL MEDICINE

## 2022-12-06 ENCOUNTER — TELEPHONE (OUTPATIENT)
Dept: INTERNAL MEDICINE | Facility: CLINIC | Age: 52
End: 2022-12-06

## 2022-12-06 DIAGNOSIS — T82.514D HICKMAN CATHETER DYSFUNCTION, SUBSEQUENT ENCOUNTER: Primary | ICD-10-CM

## 2022-12-06 NOTE — TELEPHONE ENCOUNTER
As long as he is feeling okay and there is no bleeding he can follow-up with a chest x-ray to check the position tomorrow.

## 2022-12-06 NOTE — TELEPHONE ENCOUNTER
FYI - Status Update    Who is Calling: nurse, CHRISTY ERVIN    Update: He has a JEROMY, TPR . He stepped on the tubes and pulled it a bit. IT IS KARI 5CM FROM THE HUB, IT WAS BEFORE 3 CM FROM THE HUB. SUTUURES ARE IN TACKED. BOTH LUMENS FLUSH EASILY.  Does caller want a call/response back: Yes     Okay to leave a detailed message?: Yes at Other phone number:  AZIZA MEANS [phone]

## 2022-12-13 ENCOUNTER — LAB REQUISITION (OUTPATIENT)
Dept: LAB | Facility: CLINIC | Age: 52
End: 2022-12-13
Payer: COMMERCIAL

## 2022-12-13 DIAGNOSIS — R13.10 DYSPHAGIA, UNSPECIFIED: ICD-10-CM

## 2022-12-13 LAB
ALBUMIN SERPL-MCNC: 3.2 G/DL (ref 3.4–5)
ALP SERPL-CCNC: 52 U/L (ref 40–150)
ALT SERPL W P-5'-P-CCNC: 19 U/L (ref 0–70)
ANION GAP SERPL CALCULATED.3IONS-SCNC: 3 MMOL/L (ref 3–14)
AST SERPL W P-5'-P-CCNC: 11 U/L (ref 0–45)
BASOPHILS # BLD AUTO: 0 10E3/UL (ref 0–0.2)
BASOPHILS NFR BLD AUTO: 1 %
BILIRUB DIRECT SERPL-MCNC: 0.1 MG/DL (ref 0–0.2)
BILIRUB SERPL-MCNC: 0.3 MG/DL (ref 0.2–1.3)
BILIRUB SERPL-MCNC: 0.3 MG/DL (ref 0.2–1.3)
BUN SERPL-MCNC: 17 MG/DL (ref 7–30)
CALCIUM SERPL-MCNC: 8.3 MG/DL (ref 8.5–10.1)
CHLORIDE BLD-SCNC: 111 MMOL/L (ref 94–109)
CO2 SERPL-SCNC: 29 MMOL/L (ref 20–32)
CREAT SERPL-MCNC: 0.7 MG/DL (ref 0.66–1.25)
EOSINOPHIL # BLD AUTO: 0.2 10E3/UL (ref 0–0.7)
EOSINOPHIL NFR BLD AUTO: 3 %
ERYTHROCYTE [DISTWIDTH] IN BLOOD BY AUTOMATED COUNT: 14.6 % (ref 10–15)
FASTING STATUS PATIENT QL REPORTED: YES
GFR SERPL CREATININE-BSD FRML MDRD: >90 ML/MIN/1.73M2
GLUCOSE BLD-MCNC: 118 MG/DL (ref 70–99)
HCT VFR BLD AUTO: 35.5 % (ref 40–53)
HGB BLD-MCNC: 11.3 G/DL (ref 13.3–17.7)
HOLD SPECIMEN: NORMAL
HOLD SPECIMEN: NORMAL
IMM GRANULOCYTES # BLD: 0 10E3/UL
IMM GRANULOCYTES NFR BLD: 0 %
LYMPHOCYTES # BLD AUTO: 2.2 10E3/UL (ref 0.8–5.3)
LYMPHOCYTES NFR BLD AUTO: 35 %
MAGNESIUM SERPL-MCNC: 1.8 MG/DL (ref 1.6–2.3)
MCH RBC QN AUTO: 30.1 PG (ref 26.5–33)
MCHC RBC AUTO-ENTMCNC: 31.8 G/DL (ref 31.5–36.5)
MCV RBC AUTO: 94 FL (ref 78–100)
MONOCYTES # BLD AUTO: 0.8 10E3/UL (ref 0–1.3)
MONOCYTES NFR BLD AUTO: 12 %
NEUTROPHILS # BLD AUTO: 3.2 10E3/UL (ref 1.6–8.3)
NEUTROPHILS NFR BLD AUTO: 49 %
NRBC # BLD AUTO: 0 10E3/UL
NRBC BLD AUTO-RTO: 0 /100
PHOSPHATE SERPL-MCNC: 3 MG/DL (ref 2.5–4.5)
PLATELET # BLD AUTO: 168 10E3/UL (ref 150–450)
POTASSIUM BLD-SCNC: 3.8 MMOL/L (ref 3.4–5.3)
PROT SERPL-MCNC: 6.3 G/DL (ref 6.8–8.8)
RBC # BLD AUTO: 3.76 10E6/UL (ref 4.4–5.9)
SODIUM SERPL-SCNC: 143 MMOL/L (ref 133–144)
TRIGL SERPL-MCNC: 53 MG/DL
WBC # BLD AUTO: 6.4 10E3/UL (ref 4–11)

## 2022-12-13 PROCEDURE — 83735 ASSAY OF MAGNESIUM: CPT | Performed by: SURGERY

## 2022-12-13 PROCEDURE — 80053 COMPREHEN METABOLIC PANEL: CPT | Performed by: SURGERY

## 2022-12-13 PROCEDURE — 84478 ASSAY OF TRIGLYCERIDES: CPT | Performed by: SURGERY

## 2022-12-13 PROCEDURE — 84100 ASSAY OF PHOSPHORUS: CPT | Performed by: SURGERY

## 2022-12-13 PROCEDURE — 82248 BILIRUBIN DIRECT: CPT | Performed by: SURGERY

## 2022-12-13 PROCEDURE — 85014 HEMATOCRIT: CPT | Performed by: SURGERY

## 2022-12-15 ENCOUNTER — PATIENT OUTREACH (OUTPATIENT)
Dept: CARE COORDINATION | Facility: CLINIC | Age: 52
End: 2022-12-15

## 2022-12-15 NOTE — PROGRESS NOTES
Clinic Care Coordination Contact  [facility]/Voicemail    Clinical Data: Care Coordinator Outreach  Outreach attempted x 2.  Left message on patient's voicemail with call back information and requested return call.  Plan: Care Coordinator will defer to Lead RN CC, Kinsey Muhammad, on further outreach attempt decision.    Natalie Isaac, Casual RN Care Coordinator  [facility]  (Covering for Lead RN Care Coordinator)

## 2022-12-22 ENCOUNTER — CARE COORDINATION (OUTPATIENT)
Dept: ENDOCRINOLOGY | Facility: CLINIC | Age: 52
End: 2022-12-22

## 2022-12-22 DIAGNOSIS — F90.0 ADHD (ATTENTION DEFICIT HYPERACTIVITY DISORDER), INATTENTIVE TYPE: ICD-10-CM

## 2022-12-22 DIAGNOSIS — R10.9 CHRONIC ABDOMINAL PAIN: ICD-10-CM

## 2022-12-22 DIAGNOSIS — R11.0 NAUSEA: ICD-10-CM

## 2022-12-22 DIAGNOSIS — R19.8 VISCERAL HYPERALGESIA: ICD-10-CM

## 2022-12-22 DIAGNOSIS — G89.4 CHRONIC PAIN SYNDROME: ICD-10-CM

## 2022-12-22 DIAGNOSIS — G89.29 CHRONIC ABDOMINAL PAIN: ICD-10-CM

## 2022-12-22 RX ORDER — ONDANSETRON 8 MG/1
TABLET, ORALLY DISINTEGRATING ORAL
Qty: 90 TABLET | Refills: 1 | OUTPATIENT
Start: 2022-12-22

## 2022-12-22 RX ORDER — DEXTROAMPHETAMINE SACCHARATE, AMPHETAMINE ASPARTATE, DEXTROAMPHETAMINE SULFATE AND AMPHETAMINE SULFATE 5; 5; 5; 5 MG/1; MG/1; MG/1; MG/1
TABLET ORAL
Qty: 30 TABLET | Refills: 0 | Status: SHIPPED | OUTPATIENT
Start: 2022-12-22 | End: 2023-01-25

## 2022-12-22 RX ORDER — OXYCODONE HCL 5 MG/5 ML
5-10 SOLUTION, ORAL ORAL 4 TIMES DAILY PRN
Qty: 1200 ML | Refills: 0 | Status: SHIPPED | OUTPATIENT
Start: 2022-12-22 | End: 2023-01-25

## 2022-12-22 NOTE — TELEPHONE ENCOUNTER
Received call from patient requesting refill(s) of oxyCODONE (ROXICODONE) 5 MG/5ML solution     Last dispensed from pharmacy on 12/01/2022    Patient's last office/virtual visit by prescribing provider on 11/109/2022  Next office/virtual appointment scheduled for 02/06/2023    Last urine drug screen date 11/09/2022  Current opioid agreement on file (completed within the last year) Yes Date of opioid agreement: 11/09/2022    [place] [facility] [place] [address]  [place]acy    Will route to nursing K[place] for review and preparation of prescription(s).     Anastasiia Scott MA  [facility]

## 2022-12-22 NOTE — TELEPHONE ENCOUNTER
"Requested Prescriptions   Pending Prescriptions Disp Refills    amphetamine-dextroamphetamine (ADDERALL) 20 MG tablet 30 tablet 0     Sig: TAKE ONE TABLET BY MOUTH ONCE DAILY       There is no refill protocol information for this order      Refused Prescriptions Disp Refills    ondansetron (ZOFRAN ODT) 8 MG ODT tab [Pharmacy Med Name: ONDANSETRON 8MG TBDP] 90 tablet 1     Sig: DISSOLVE ONE TABLET ON TONGUE EVERY 8 HOURS AS NEEDED FOR NAUSEA        Antivertigo/Antiemetic Agents Passed - 12/22/2022 10:54 AM        Passed - Recent (12 mo) or future (30 days) visit within the authorizing provider's specialty     Patient has had an office visit with the authorizing provider or a provider within the authorizing providers department within the previous 12 mos or has a future within next 30 days. See \"Patient Info\" tab in inbasket, or \"Choose Columns\" in Meds & Orders section of the refill encounter.              Passed - Medication is active on med list        Passed - Patient is 18 years of age or older              Aparna Sr RN  "

## 2022-12-22 NOTE — TELEPHONE ENCOUNTER
Signed Prescriptions:                        Disp   Refills    oxyCODONE (ROXICODONE) 5 MG/5ML solution   1200 mL0        Sig: Take 5-10 mLs (5-10 mg) by mouth 4 times daily as           needed for pain Max of 40mg/day. Put at least 4           hours between doses. Fill 12/31/22 and start           01/03/22. 30 day supply for chronic pain.  Authorizing Provider: NATALIE MCDOWELL    Reviewed [place]  December 22, 2022- no concerning fills.    Natalie MENARD, RN CNP, FNP  [facility]  [facility]

## 2022-12-22 NOTE — TELEPHONE ENCOUNTER
Routing to provider to review medication prepped per below    Oxycodone 5mg/5mL, #1200mL, Refill:no  Sig:Max of 40mg/day. Put at least 4 hours between doses. Fill 12/31/22 and start 01/03/22. 30 day supply for chronic pain.  Last picked up 12/01/22 with start on 12/04/22  Due01/03/23    Per last OV note 11/09/22  1. Oxycodone liquid 5mg/5ml,  use 5-10mg (5-10mls) every 4 hours as needed, max of 40mg (40ml) per day. Fill 12/1 and start 12/4.

## 2022-12-22 NOTE — PROGRESS NOTES
Phone call to [facility] to order Enfit drainage bags and Enfit continuous feed extension sets for patient.      Contact Information:    Phone: [phone]  Fax: [phone]    Will need to fax over most current provider note and signed Rx for supplies.

## 2022-12-23 ENCOUNTER — TELEPHONE (OUTPATIENT)
Dept: GASTROENTEROLOGY | Facility: CLINIC | Age: 52
End: 2022-12-23

## 2022-12-23 DIAGNOSIS — G89.29 CHRONIC ABDOMINAL PAIN: ICD-10-CM

## 2022-12-23 DIAGNOSIS — G89.4 CHRONIC PAIN SYNDROME: ICD-10-CM

## 2022-12-23 DIAGNOSIS — R19.8 VISCERAL HYPERALGESIA: ICD-10-CM

## 2022-12-23 DIAGNOSIS — R78.81 GRAM-POSITIVE BACTEREMIA: Primary | ICD-10-CM

## 2022-12-23 DIAGNOSIS — R10.9 CHRONIC ABDOMINAL PAIN: ICD-10-CM

## 2022-12-23 RX ORDER — BISACODYL 5 MG
TABLET, DELAYED RELEASE (ENTERIC COATED) ORAL
Qty: 4 TABLET | Refills: 0 | Status: SHIPPED | OUTPATIENT
Start: 2022-12-23 | End: 2023-01-26

## 2022-12-23 RX ORDER — POLYETHYLENE GLYCOL 3350 17 G/17G
1 POWDER, FOR SOLUTION ORAL 2 TIMES DAILY
Qty: 238 G | Refills: 0 | Status: SHIPPED | OUTPATIENT
Start: 2022-12-23 | End: 2023-04-05

## 2022-12-23 RX ORDER — FENTANYL 25 UG/1
1 PATCH TRANSDERMAL
Qty: 15 PATCH | Refills: 0 | Status: SHIPPED | OUTPATIENT
Start: 2022-12-23 | End: 2023-01-25

## 2022-12-23 NOTE — TELEPHONE ENCOUNTER
Multiple reschedules and poor prep colonoscopies.    Attempted to contact patient regarding upcoming colonoscopy  procedure on 1/9/23 for pre assessment questions. No answer.     Left message to return call to [phone] #[phone]    Discuss Covid policy.     Pre op exam scheduled: N/A    Arrival time: 1135    [facility] [address] [place] [address], 5th Floor, [place] [address]    Sedation type: MAC    Anticoagulants: Yes Enoxaparin (Lovenox). Holding interval of 24 hours     Electronic implanted devices? No    Diabetic? No    Indication for procedure: Strep bovus bacteremia     Bowel prep recommendation: Extended prep Golytely PLUS Miralax twice daily start 7 days prior (failed colonoscopy x 2 with solid stool in colon even after 2 day prep recommended) Per chart review pt also on oxycodone  & fentanyl patches    Prep instructions sent via [facility]. Bowel prep script [place] [facility] [place] - [address]      MOODY ARANA RN

## 2022-12-23 NOTE — TELEPHONE ENCOUNTER
Patient requesting refill(s) of fentaNYL (DURAGESIC) 25 mcg/hr 72 hr patch    Last dispensed from pharmacy on 12/01/22    Patient's last office/virtual visit by prescribing provider on 11/09/22  Next office/virtual appointment scheduled for 02/06/23    Last urine drug screen date 11/09/22  Current opioid agreement on file (completed within the last year) Yes Date of opioid agreement: 11/11/22    E-prescribe to [facility] [place] - [place], [place]     Will route to nursing [place] for review and preparation of prescription(s).

## 2022-12-23 NOTE — TELEPHONE ENCOUNTER
Medication refill information reviewed.     Fentanyl last due:  Fill 12/1/22 and start 12/4/22. 30 days for chronic pain  Due date:  1/3/23      Prescriptions prepped for review.   SAILAJA Matson CNP out of office.   Routed to provider pool.    Demetrice RN-BSN  [facility]

## 2022-12-23 NOTE — TELEPHONE ENCOUNTER
Chart reviewed - request appears appropriate. Patient of Natalie Kurtis, refilled on her behalf.     Gay Godoy, WARD, APRN, AGNP-C  [facility]

## 2022-12-23 NOTE — TELEPHONE ENCOUNTER
Notified pt the following Prescriptions will be filled at [facility] [place] - [place], [place]   oxyCODONE (ROXICODONE) 5 MG/5ML solution   1200 mL0        Sig: Take 5-10 mLs (5-10 mg) by mouth 4 times daily as           needed for pain Max of 40mg/day. Put at least 4           hours between doses. Fill 12/31/22 and start           01/03/22.     Lyndsay Solo MA

## 2022-12-26 ENCOUNTER — MYC MEDICAL ADVICE (OUTPATIENT)
Dept: PALLIATIVE MEDICINE | Facility: CLINIC | Age: 52
End: 2022-12-26

## 2022-12-30 ENCOUNTER — PATIENT OUTREACH (OUTPATIENT)
Dept: CARE COORDINATION | Facility: CLINIC | Age: 52
End: 2022-12-30

## 2022-12-30 NOTE — TELEPHONE ENCOUNTER
2nd attempt    Attempted to contact patient regarding upcoming colonoscopy  procedure on 1.9.23 for pre assessment questions. No answer.     Left message to return call to [phone] #[phone]    MyChart message sent    Beth Angeles RN  Endoscopy Procedure Pre Assessment RN

## 2022-12-30 NOTE — LETTER
[facility] CARE COORDINATION  [address] [address]  Phone: [phone]      January 12, 2023      Parker Acevedo  [address] [address]    Dear Parker,    We have been trying to reach you to introduce you to [facility] s Care Coordination program.  The goal of care coordination is to help you manage your health and improve access to the [facility] system in the most efficient manner.  The Care Coordinator is a nurse who understands the healthcare system and will assist you in improving your access to care.     As your Physician and Care Coordinator we partner to help you achieve your health care goals.     We will continue to reach out; however, if you are able to call your Care Coordinator at [phone], that would be appreciated.  We at [facility] are focused on providing you with the highest-quality healthcare experience possible.      It is a pleasure to partner with you as we work towards achieving your optimal state of wellness.        Sincerely,    CHRISTY Abdul Paul D None   [address] [address]

## 2022-12-30 NOTE — PROGRESS NOTES
Clinic Care Coordination Contact  [facility]/Voicemail       Clinical Data: Care Coordinator Outreach  Outreach attempted x 1.  Left message on patient's voicemail with call back information and requested return call.  Plan: Care Coordinator sent care coordination introduction letter on 5/20/2022 via [employer]. Care Coordinator will try to reach patient again in 10 business days.    Kinsey Muhammad RN Care Coordination   [facility]  Email: [email or web address]  Phone: [phone]

## 2023-01-03 NOTE — PROGRESS NOTES
This is a recent snapshot of the patient's [facility] medical record.  For current drug dose and complete information and questions, call [phone] or In Basket pool, fv home infusion ([id number])  CSN Number:  [id number]

## 2023-01-04 RX ORDER — HEPARIN SODIUM,PORCINE 10 UNIT/ML
5 VIAL (ML) INTRAVENOUS
Status: CANCELLED | OUTPATIENT
Start: 2023-01-04

## 2023-01-04 RX ORDER — HEPARIN SODIUM (PORCINE) LOCK FLUSH IV SOLN 100 UNIT/ML 100 UNIT/ML
5 SOLUTION INTRAVENOUS
Status: CANCELLED | OUTPATIENT
Start: 2023-01-04

## 2023-01-04 NOTE — PROGRESS NOTES
Attestation:  This patient has been seen and evaluated by me, Antoine Bonner on 9/21/2017.  I saw and discussed the case with the primary resident and the care team. I agree with the findings and plan in this note. I have reviewed today's vital signs, medications, laboratory results and imaging results.    Antoine Bonner MD  [facility] - Inpatient daily progress note    Date of admission: 9/19/2017  Date of service: 9/21/2017.           Assessment and Plan:   Assessment:   Parker Acevedo Sr is a 46 year old  male with significant past medical history of gastric bypass (2002) with subsequent development of short gut syndrome now dependent on TPN (2 years) and history of bacteremia secondary to line infections, who presents with fevers, chills and generalized malaise found to have positive Strep salivarius, admitted for management of bacteremia.   Patient Active Problem List   Diagnosis     Peptic ulcer disease     Gastric bypass status for obesity     CARDIOVASCULAR SCREENING; LDL GOAL LESS THAN 160     Chronic abdominal pain     Ulcer (H)     Vomiting     Anemia     ADHD (attention deficit hyperactivity disorder), inattentive type     Vitamin B12 deficiency without anemia     Thiamine deficiency     Dehydration     Dysphagia     Weight loss, non-intentional     Malnutrition (H)     Chronic anxiety     Constipation     Bile reflux esophagitis     Former smoker     Vitamin D deficiency     Iron deficiency     Health Care Home     Chronic pain     Insomnia     Positive blood culture     Fungemia     Chronic nausea     Gastrostomy tube in place (H)     Cardiomyopathy in nutritional diseases (H)     Severe malnutrition (H)     Short gut syndrome     Anxiety     Status post cervical spinal arthrodesis     Port or reservoir infection, initial encounter     Abnormal echocardiogram     Bacteremia     Low serum iron      Group Therapy Documentation     Was the patient seen in-person or via Telemedicine (if in-person skip to Group Note): Telemedicine    If Telemedicine: The patient's condition can be safely assessed and treated via synchronous audio and visual telemedicine encounter. ? ?      Reason for Telemedicine Visit: Patient has requested telehealth visit    Originating Site (Patient Location): Patient's home    Distant Site (Provider Location): Provider Remote Setting- Home Office    Consent: ?The patient/guardian has verbally consented to: the potential risks and benefits of telemedicine (video visit) versus in person care; bill my insurance or make self-payment for services provided; and responsibility for payment of non-covered services.       Mode of Communication:??Video Conference via Zoom      As the provider I attest to compliance with applicable laws and regulations related to telemedicine.        Patient attended a video group session on the following days: ?          GROUP NOTE:  DATE OF SERVICE:  January 3, 2023    START TIME:  5:30pm    END TIME:  7:20pm    Group Length:   110 minutes    FACILITATOR(S):    Nirali CEDILLO CGC    TOPIC: BEH Group Therapy, Problem Gambling Group     Number of patients attending the group: 9    Group Therapy Type:  Problem Gambling Group    Group Attendance:  Client attended group     Summary of Group / Topics Discussed:   Group started with each person taking turns for check in and identifying two feelings. The group completed last week s online handouts on topics of Triggers and Relapse, with sharing personal stories and discussion. Patient's watched a short video: Understand and Manage Your Monkey Mind - Anxiety, Anger, Depression explained, follow by discussion. Group ended with a mindful reading/task: Listen to a Song.      Patient's response to the group topic/interactions:    Patient appeared to identify triggers and paths to relapse.       Client specific details:     Patient  Anemia, iron deficiency      Plan:   ##fever, chills  ##bacteremia  Fever and chills secondary to bacteremia with likely source being portacath. He remains hemodynamically stable and afebrile. Blood cultures drawn 9/19 positive for Strep salivarius from the right arm and G+ cocci in the portacath. Patient has a history of multiple episodes of bacteremia secondary to infected PICC/port.   - ID consult, appreciate recs  - continue vancomycin with pretreatment with slow infusion and benadryl    - await final blood culture results  - trend CRP, CBC  - continue daily blood cultures  - hold off on port removal until further culture results  - Await TTE results for concerns for endocarditis   - continue mIVF in setting of insensible losses secondary to fever and poor PO hydration     ##cough  ##Possible CAP  Parker reports a history of cough, however this is a chronic intermittent issue likely secondary to history of smoking. CXR revealed an opacity of the left posterior costophrenic angle which may represent infection or atelectasis. Procalcitonin in low risk range.   -will hold off on broadening antibiotics to cover for CAP  -continuous pulse ox  -if continuing to experience fever while receiving treatment with vancomycin, will consider broadening Abx coverage     ##Elevated LFT's  AST and ALT both elevated but trending down. Etiology unclear. No prior history of elevated LFTs per chart review. Denies alcohol use. Normal abdominal exam. RUQ ultrasound does not show evidence of steatosis or cirrhosis.   - repeat CMP in AM  - pending hepatitis B/C viral tests     ##h/o gastric bypass  ##short gut syndrome  ##malnutrition  -continue to hold TPN and will follow up with ID regarding when to re-start  -continue PTA Vit B12 and Vit D supplements     ##chronic pain syndrome  ##chronic back pain  Follows with [facility]. Last visit 9/18/2017.   -continue PTA oxycodone 10-15mg q4h prn, max 55mg per  shared with group his struggle with tiredness in recovery. Other group members and writer discussed with patient this could be gambling withdrawal symptoms. Patient shared he continues to see his mental health therapist.       Nirali CEDILLO, [facility]    day  -continue PTA fentanyl 50mcg patch q48hr. Next due change 9/20     ##h/o palpitations, intermittent chest pain  ##h/o decreased LVEF  Echo done 1/16/2016 showed midly reduced LV systolic function (LVEF 45-50%) and mild diffuse hypokinesis. Dobutamine stress echo done 2/13/2017 showed mild cardiomyopathy with good contractile reserve and no inducible ischemia, mild global hypokinesia of left ventricle with LVEF 45-50%. Seen by cardiolology on 7/24/2017 where he was started on Coreg 3.125mg BID. Then later seen by Cardiology at [facility] on 8/31/2017 for history of ongoing palpitations. At that time Coreg was increased from 3.125mg BID to 6.25mg BID and a cardiac event monitor was placed for a 30 day monitoring period.  - continue PTA coreg 6.25mg BID  - continue PTA cardiac event monitor  - Follow up with Cardiology outpatient      ##PUD  - continue PTA carafate      ##Iron Deficiency Anemia  Started outpatient IV iron infusion 9/18. Next due 9/20. Hb stable at 11.3.  -continue to monitor hemoglobin with daily CBC  -follow up outpatient for continued IV iron infusion therapy     ##ADHD  -continue PTA adderall     Daily cares -   F:NS at 100cc/hr  E:stable  N: Regular diet, TPN on hold  Lines: midline  Activity:-Up as tolerated  CODE:Full Code  Prophylaxis: mechanical  PCP communication:  - Esteban Daly  - Contacted at admission: No  - Concerns or updates: none  Dispo: Expected discharge date pending clinical improvement and negative blood cultures             Interval History:   Parker Acevedo  had no acute overnight events. He remains afebrile with stable vital signs. He reports no concerns. Improved malaise and night sweats. He is feeling better. Would like to discharge home from the hospital as soon as possible.             Review of Systems:   CONSTITUTIONAL: no fatigue, no unexpected change in weight  SKIN: no worrisome rashes or lesions  EYES: no acute vision problems or changes  RESP: no  significant cough, no shortness of breath  CV: no chest pain, no new or worsening peripheral edema  NEURO: no weakness, no headaches  PSYCHIATRIC: +anxiety       Physical Exam (Resident / Clinician):   Vitals were reviewed  Temp: 96.6  F (35.9  C) Temp src: Oral BP: 121/79 Pulse: 70   Resp: 16 SpO2: 99 % O2 Device: None (Room air)      Constitutional:   awake, alert, cooperative, no apparent distress, and appears stated age     Eyes:   Lids and lashes normal, pupils equal, sclera clear, conjunctiva normal     Lungs:   No increased work of breathing, good air exchange, clear to auscultation bilaterally, no crackles or wheezing     Cardiovascular:   Regular rate and rhythm, normal S1 and S2, no S3 or S4, and no murmur noted     Abdomen:   Normal bowel sounds, soft, non-distended, non-tender, no masses palpated     Musculoskeletal:   no lower extremity pitting edema present     Neuropsychiatric:   General: normal, calm and normal eye contact  Level of consciousness: alert / normal  Affect: normal     Skin:   no redness, warmth, or swelling     Intake/Output Summary (Last 24 hours) at 09/21/17 1255  Last data filed at 09/21/17 1248   Gross per 24 hour   Intake          2128.33 ml   Output                0 ml   Net          2128.33 ml           Data:   ROUTINE LABS (Last four results)  [facility]  Recent Labs  Lab 09/21/17  0653 09/20/17  0549 09/19/17  1929 09/19/17  0037 09/15/17  1900    139 140 140 144   POTASSIUM 3.7 3.4 3.7 3.5 3.0*   CHLORIDE 110* 106 107 107 112*   CO2 26 27 28 27 24   ANIONGAP 4 6 5 6 8   GLC 96 100* 98 91 156*   BUN 6* 8 10 13 15   CR 0.72 0.67 0.68 0.72 0.66   GFRESTIMATED >90 >90 >90 >90 >90   GFRESTBLACK >90 >90 >90 >90 >90   SILAS 7.8* 7.6* 8.2* 7.9* 7.9*   MAG  --   --   --   --  1.8   PHOS  --   --   --   --  2.2*   PROTTOTAL 6.3* 6.4* 6.9  --  7.1   ALBUMIN 2.9* 3.0* 3.2*  --  3.3*   BILITOTAL 0.4 0.5 0.4  --  0.4   ALKPHOS 94 115 122  --  72   AST 56* 135* 222*  --  15   * 192* 220*   --  23     CBC  Recent Labs  Lab 09/21/17  0653 09/20/17  0549 09/19/17 1929 09/19/17 0037   WBC 5.2 5.3 4.8 8.5   RBC 3.92* 4.11* 4.20* 4.13*   HGB 11.3* 11.9* 12.3* 12.2*   HCT 36.1* 37.6* 38.4* 37.9*   MCV 92 92 91 92   MCH 28.8 29.0 29.3 29.5   MCHC 31.3* 31.6 32.0 32.2   RDW 16.8* 16.6* 16.6* 16.5*   * 104* 112* 128*     INR  Recent Labs  Lab 09/19/17 1929   INR 1.13     CRP  Recent Labs  Lab 09/21/17  0653 09/20/17  0549 09/19/17 1929   CRP 88.0* 82.0* 57.0*       Blood culture:  Invalid input(s): BC   Urine culture:  No results for input(s): URC in the last 168 hours.  All cultures:    Recent Labs  Lab 09/21/17  0656 09/21/17  0653 09/20/17  0556 09/20/17  0549 09/19/17 2010 09/19/17 1929 09/19/17 0038 09/19/17 0037   CULT PENDING PENDING No growth after 12 hours No growth after 12 hours No growth after 20 hours Cultured on the 2nd day of incubation:Gram positive cocci in pairs and chains*  Critical Value/Significant Value, preliminary result only, called to and read back byAnnel Hirshc RN at 0814 on 9.20.17.KD  Cultured on the 1st day of incubation:Streptococcus salivarius groupSusceptibility testing in progress*  Critical Value/Significant Value, preliminary result only, called to and read back byDr. Jimenez, from [facility]. 09.19.17 at 1357. GR. Cultured on the 1st day of incubation:Gram positive cocci*  Critical Value/Significant Value, preliminary result only, called to and read back byDr. Jimenez from [facility]. 09.19.17 at 1041. GR.  (Note)NEGATIVE for the following: Staphylococcus spp., Staph aureus, Staphepidermidis, Staph lugdunensis, Streptococcus spp., Strep pneumoniae,Strep pyogenes, Strep agalactiae, Strep anginosus group, Enterococcusfaecalis, Enterococcus faecium, and Listeria spp. by Verigenemultiplex nucleic acid test. Final identification and antimicrobialsusceptibility testing will be verified by standard methods.Critical Value/Significant Value called to and read back by   from [facility]. 09.19.17 at 1357. GR.         Medications:     Current Facility-Administered Medications   Medication     hydrOXYzine (ATARAX) tablet 25 mg     melatonin tablet 3 mg     vancomycin (VANCOCIN) 1,750 mg in NaCl 0.9 % 500 mL intermittent infusion     diphenhydrAMINE (BENADRYL) injection 50 mg     dextrose 50 % injection     cyanocobalamin (VITAMIN B12) injection 1,000 mcg     amphetamine-dextroamphetamine (ADDERALL) per tablet 20 mg     anticoagulant citrate flush 5 mL     albuterol (PROAIR HFA/PROVENTIL HFA/VENTOLIN HFA) Inhaler 2 puff     carvedilol (COREG) tablet 6.25 mg     fentaNYL (DURAGESIC) 50 mcg/hr 72 hr patch 1 patch     oxyCODONE (ROXICODONE) solution 10-15 mg     senna-docusate (SENOKOT-S;PERICOLACE) 8.6-50 MG per tablet 1-2 tablet     sucralfate (CARAFATE) suspension 1 g     [START ON 9/24/2017] vitamin D (ERGOCALCIFEROL) capsule 50,000 Units     naloxone (NARCAN) injection 0.1-0.4 mg     lidocaine 1 % 1 mL     lidocaine (LMX4) kit     sodium chloride (PF) 0.9% PF flush 3 mL     sodium chloride (PF) 0.9% PF flush 3 mL     acetaminophen (TYLENOL) tablet 650 mg     ondansetron (ZOFRAN-ODT) ODT tab 4 mg    Or     ondansetron (ZOFRAN) injection 4 mg     0.9% sodium chloride infusion     fentaNYL (DURAGESIC) patch REMOVAL     fentaNYL (DURAGESIC) Patch in Place     Facility-Administered Medications Ordered in Other Encounters   Medication     DOBUTamine 500 mg in dextrose 5% 250 mL (adult std)       Caring Physician: Anastasiia Mattson MD  [facility] [place]  Pager Contact: see Physician sticky note

## 2023-01-06 NOTE — TELEPHONE ENCOUNTER
RN attempted to contact pt to relay Dr. Estrada's message below.     No answer.  Left message to return call [phone] #4    Alicia Benton RN  Endoscopy Procedure Pre Assessment RN

## 2023-01-06 NOTE — TELEPHONE ENCOUNTER
"Pre assessment questions completed for upcoming colonoscopy  procedure scheduled on 1.9.2023    COVID policy reviewed.     Reviewed procedural arrival time 1130 and facility location [facility]; [address] [place] [address], [address], [place] [address]    Designated  policy reviewed. Instructed to have someone stay 24 hours post procedure.     Anticoagulation/blood thinners? Yes Enoxaparin (Lovenox). Holding interval of 24 hours  Patient takes at 10am and 10pm daily.  Per patient's wife he does not need to hold as they are \"just taking a look.\"  RN sent a staff message to Dr. Estrada to confirm.     Electronic implanted devices? No    Diabetic? No    Reviewed procedure prep instructions.     Patient's wife Rose verbalized understanding and had no questions or concerns at this time.    Alicia Benton RN  Endoscopy Procedure Pre Assessment RN    "

## 2023-01-06 NOTE — TELEPHONE ENCOUNTER
"Per Dr. Estrada \"Ok to continue lovenox for diagnostic colonoscopy. We can just take a look. And even do small biopsies.     If he has very large polyps we would need to bring him back off therapy.     Ok to proceed.\"      "

## 2023-01-09 ENCOUNTER — TELEPHONE (OUTPATIENT)
Dept: GASTROENTEROLOGY | Facility: CLINIC | Age: 53
End: 2023-01-09

## 2023-01-09 RX ORDER — LIDOCAINE 40 MG/G
CREAM TOPICAL
Status: CANCELLED | OUTPATIENT
Start: 2023-01-09

## 2023-01-09 RX ORDER — ONDANSETRON 2 MG/ML
4 INJECTION INTRAMUSCULAR; INTRAVENOUS
Status: CANCELLED | OUTPATIENT
Start: 2023-01-09

## 2023-01-09 NOTE — TELEPHONE ENCOUNTER
Caller: Parker Acevedo  Reason for Reschedule/Cancellation (please be detailed, any staff messages or encounters to note?): unsuccessful prep      Prior to reschedule please review:    Ordering Provider:Sean    Sedation per order:mac    Does patient have any ASC Exclusions, please identify?:       Notes on Cancelled Procedure:    Procedure:Lower Endoscopy [Colonoscopy]     Date: 1/9/23    Location:[facility] [place]; [address] [place] [address], [address] [place] [address]    Surgeon: Sean        Rescheduled: Yes    Procedure: Lower Endoscopy [Colonoscopy]     Date: 3/20/23    Location:[facility] [place]; [address] [place] [address], [place] [address]    Surgeon: Sean    Sedation Level Scheduled  mac,  Reason for Sedation Level     Prep/Instructions updated and sent: breanna

## 2023-01-12 NOTE — PROGRESS NOTES
This is a recent snapshot of the patient's [facility] medical record.  For current drug dose and complete information and questions, call [phone] or In Basket pool, fv home infusion ([id number])  CSN Number:  [id number]

## 2023-01-12 NOTE — PROGRESS NOTES
Clinic Care Coordination Contact  [facility]/Voicemail       Clinical Data: Care Coordinator Outreach  Outreach attempted x 2.  Left message on patient's voicemail with call back information and requested return call.  Plan: Care Coordinator will send unable to contact letter with care coordinator contact information via [employer]. Care Coordinator will try to reach patient again in 10 business days.    Kinsey Muhammad RN Care Coordination   [facility] GarnerYeyo Goldman  Email: [email or web address]  Phone: [phone]

## 2023-01-12 NOTE — PROGRESS NOTES
This is a recent snapshot of the patient's [facility] medical record.  For current drug dose and complete information and questions, call [phone] or In Basket pool, fv home infusion ([id number])  CSN Number:  [id number]

## 2023-01-13 ENCOUNTER — HOSPITAL ENCOUNTER (OUTPATIENT)
Dept: GENERAL RADIOLOGY | Facility: CLINIC | Age: 53
Discharge: HOME | End: 2023-01-13
Attending: INTERNAL MEDICINE
Payer: COMMERCIAL

## 2023-01-13 ENCOUNTER — OFFICE VISIT (OUTPATIENT)
Dept: INTERNAL MEDICINE | Facility: CLINIC | Age: 53
End: 2023-01-13
Payer: COMMERCIAL

## 2023-01-13 VITALS
OXYGEN SATURATION: 99 % | TEMPERATURE: 98.4 F | WEIGHT: 188 LBS | BODY MASS INDEX: 26.22 KG/M2 | RESPIRATION RATE: 16 BRPM | DIASTOLIC BLOOD PRESSURE: 76 MMHG | SYSTOLIC BLOOD PRESSURE: 128 MMHG | HEART RATE: 68 BPM

## 2023-01-13 DIAGNOSIS — Z98.84 GASTRIC BYPASS STATUS FOR OBESITY: ICD-10-CM

## 2023-01-13 DIAGNOSIS — I82.B23: ICD-10-CM

## 2023-01-13 DIAGNOSIS — F90.0 ADHD (ATTENTION DEFICIT HYPERACTIVITY DISORDER), INATTENTIVE TYPE: Primary | ICD-10-CM

## 2023-01-13 DIAGNOSIS — T82.514D HICKMAN CATHETER DYSFUNCTION, SUBSEQUENT ENCOUNTER: ICD-10-CM

## 2023-01-13 DIAGNOSIS — I82.621 ACUTE DEEP VEIN THROMBOSIS (DVT) OF RIGHT UPPER EXTREMITY, UNSPECIFIED VEIN (H): ICD-10-CM

## 2023-01-13 DIAGNOSIS — E43 UNSPECIFIED SEVERE PROTEIN-CALORIE MALNUTRITION (H): ICD-10-CM

## 2023-01-13 DIAGNOSIS — Z78.9 ON TOTAL PARENTERAL NUTRITION: ICD-10-CM

## 2023-01-13 PROBLEM — B18.1 HEPATITIS B, CHRONIC (H): Status: RESOLVED | Noted: 2022-08-02 | Resolved: 2023-01-13

## 2023-01-13 PROCEDURE — 90682 RIV4 VACC RECOMBINANT DNA IM: CPT | Performed by: INTERNAL MEDICINE

## 2023-01-13 PROCEDURE — G0008 ADMIN INFLUENZA VIRUS VAC: HCPCS | Performed by: INTERNAL MEDICINE

## 2023-01-13 PROCEDURE — 71046 X-RAY EXAM CHEST 2 VIEWS: CPT

## 2023-01-13 PROCEDURE — 99214 OFFICE O/P EST MOD 30 MIN: CPT | Mod: 25 | Performed by: INTERNAL MEDICINE

## 2023-01-13 RX ORDER — LORAZEPAM 1 MG/1
TABLET ORAL
Qty: 15 TABLET | Refills: 0 | Status: SHIPPED | OUTPATIENT
Start: 2023-01-13 | End: 2023-01-27

## 2023-01-13 NOTE — PROGRESS NOTES
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  He has history of gastric bypass surgery with has issues with his stomach.  He has chronic pain, chronic nausea he is TPN dependent.  He has a G-tube to drain it.  Really has minimal calories going into his stomach.  Due to his chronic TPN he has had multiple catheters he has a Cm catheter in but he does develop clots on there.  He has been on Lovenox shots for these clots due to problems with other blood thinners.  We will repeat his ultrasound today and see if he can go down to once a day shots of the Lovenox.    ADHD he is on Adderall which we will continue to monthly.    Difficulties with sleep and his TPN with anxiety he continues Ativan at night I will give him an a prescription of 15 pills today so he gets on track with his other pills on the first of the month and then do 30 on 1 February.               Return in about 6 months (around 7/13/2023) for Routine Visit.    Chuy Isaac MD  [facility] MASHA Canales is a 52 year old accompanied by his spouse, presenting for the following health issues:  RECHECK    History of Present Illness       Reason for visit:  Blood clots check up    He eats 0-1 servings of fruits and vegetables daily.He consumes 1 sweetened beverage(s) daily.He exercises with enough effort to increase his heart rate 9 or less minutes per day.  He exercises with enough effort to increase his heart rate 3 or less  days per week.   He is taking medications regularly.       Ultrasound today for his clots, taking lovenox twice a day.  Clots were 9 months ago.  Question if could go to 1.5 mg/kg once a day.      Cm on the left side. TPN and hydration every night.      Weight is going up, maybe clothes with winter coat.      Trying to get meds together on the 1st, will do a short ativan script.     Adderall is stable for once a day.    Colonoscopy in March, will need a better clean up . Will do more days in advance.      Labs have been stable.     Review of Systems         Objective    /76   Pulse 68   Temp 98.4  F (36.9  C) (Temporal)   Resp 16   Wt 85.3 kg (188 lb)   SpO2 99%   BMI 26.22 kg/m    There is no height or weight on file to calculate BMI.  Physical Exam   No acute distress, accompanied by his wife  Heart is regular  Lungs are clear with a slight wheeze.  Patient continues to smoke  Extremities are without edema.

## 2023-01-18 ENCOUNTER — LAB REQUISITION (OUTPATIENT)
Dept: LAB | Facility: CLINIC | Age: 53
End: 2023-01-18
Payer: COMMERCIAL

## 2023-01-18 DIAGNOSIS — R13.10 DYSPHAGIA, UNSPECIFIED: ICD-10-CM

## 2023-01-18 LAB
ALBUMIN SERPL BCG-MCNC: 3.6 G/DL (ref 3.5–5.2)
ALP SERPL-CCNC: 62 U/L (ref 40–129)
ALT SERPL W P-5'-P-CCNC: 16 U/L (ref 10–50)
ANION GAP SERPL CALCULATED.3IONS-SCNC: 8 MMOL/L (ref 7–15)
AST SERPL W P-5'-P-CCNC: 15 U/L (ref 10–50)
BASOPHILS # BLD AUTO: 0 10E3/UL (ref 0–0.2)
BASOPHILS NFR BLD AUTO: 1 %
BILIRUB DIRECT SERPL-MCNC: <0.2 MG/DL (ref 0–0.3)
BILIRUB SERPL-MCNC: 0.4 MG/DL
BUN SERPL-MCNC: 10.8 MG/DL (ref 6–20)
CALCIUM SERPL-MCNC: 8.5 MG/DL (ref 8.6–10)
CHLORIDE SERPL-SCNC: 106 MMOL/L (ref 98–107)
CREAT SERPL-MCNC: 0.69 MG/DL (ref 0.67–1.17)
DEPRECATED HCO3 PLAS-SCNC: 27 MMOL/L (ref 22–29)
EOSINOPHIL # BLD AUTO: 0.2 10E3/UL (ref 0–0.7)
EOSINOPHIL NFR BLD AUTO: 3 %
ERYTHROCYTE [DISTWIDTH] IN BLOOD BY AUTOMATED COUNT: 14.4 % (ref 10–15)
FASTING STATUS PATIENT QL REPORTED: NORMAL
GFR SERPL CREATININE-BSD FRML MDRD: >90 ML/MIN/1.73M2
GLUCOSE SERPL-MCNC: 89 MG/DL (ref 70–99)
HCT VFR BLD AUTO: 37.1 % (ref 40–53)
HGB BLD-MCNC: 12.1 G/DL (ref 13.3–17.7)
HOLD SPECIMEN: NORMAL
IMM GRANULOCYTES # BLD: 0 10E3/UL
IMM GRANULOCYTES NFR BLD: 0 %
LYMPHOCYTES # BLD AUTO: 1.9 10E3/UL (ref 0.8–5.3)
LYMPHOCYTES NFR BLD AUTO: 29 %
MAGNESIUM SERPL-MCNC: 1.7 MG/DL (ref 1.7–2.3)
MCH RBC QN AUTO: 30.4 PG (ref 26.5–33)
MCHC RBC AUTO-ENTMCNC: 32.6 G/DL (ref 31.5–36.5)
MCV RBC AUTO: 93 FL (ref 78–100)
MONOCYTES # BLD AUTO: 0.6 10E3/UL (ref 0–1.3)
MONOCYTES NFR BLD AUTO: 9 %
NEUTROPHILS # BLD AUTO: 3.7 10E3/UL (ref 1.6–8.3)
NEUTROPHILS NFR BLD AUTO: 58 %
PHOSPHATE SERPL-MCNC: 3.5 MG/DL (ref 2.5–4.5)
PLATELET # BLD AUTO: 198 10E3/UL (ref 150–450)
POTASSIUM SERPL-SCNC: 3.6 MMOL/L (ref 3.4–5.3)
PROT SERPL-MCNC: 6.2 G/DL (ref 6.4–8.3)
RBC # BLD AUTO: 3.98 10E6/UL (ref 4.4–5.9)
SODIUM SERPL-SCNC: 141 MMOL/L (ref 136–145)
TRIGL SERPL-MCNC: 64 MG/DL
WBC # BLD AUTO: 6.5 10E3/UL (ref 4–11)

## 2023-01-18 PROCEDURE — 83735 ASSAY OF MAGNESIUM: CPT | Performed by: SURGERY

## 2023-01-18 PROCEDURE — 84478 ASSAY OF TRIGLYCERIDES: CPT | Performed by: SURGERY

## 2023-01-18 PROCEDURE — 84100 ASSAY OF PHOSPHORUS: CPT | Performed by: SURGERY

## 2023-01-18 PROCEDURE — 82248 BILIRUBIN DIRECT: CPT | Performed by: SURGERY

## 2023-01-18 PROCEDURE — 80053 COMPREHEN METABOLIC PANEL: CPT | Performed by: SURGERY

## 2023-01-18 PROCEDURE — 85004 AUTOMATED DIFF WBC COUNT: CPT | Performed by: SURGERY

## 2023-01-18 NOTE — TELEPHONE ENCOUNTER
Signed Prescriptions:                        Disp   Refills    oxyCODONE (ROXICODONE) 5 MG/5ML solution   1650 mL0        Sig: Take 10-15 mLs (10-15 mg) by mouth every 4 hours as           needed for moderate to severe pain Max of 55 mg           per day. Fill on/after 11/15/17 not to start           untill 11/17/17.  Authorizing Provider: BRANDYN JENKINS    fentaNYL (DURAGESIC) 50 mcg/hr 72 hr patch 15 pat*0        Sig: Place 1 patch onto the skin every 48 hours MYLAN           BRAND ONLY. Fill on/after 11/15/17 to start           on/after 11/17/17  Authorizing Provider: BRANDYN JENKINS MD  [facility]     nausea and vomiting

## 2023-01-21 ENCOUNTER — APPOINTMENT (OUTPATIENT)
Dept: GENERAL RADIOLOGY | Facility: CLINIC | Age: 53
End: 2023-01-21
Attending: EMERGENCY MEDICINE
Payer: COMMERCIAL

## 2023-01-21 ENCOUNTER — HOSPITAL ENCOUNTER (EMERGENCY)
Facility: CLINIC | Age: 53
Discharge: HOME | End: 2023-01-21
Attending: EMERGENCY MEDICINE | Admitting: EMERGENCY MEDICINE
Payer: COMMERCIAL

## 2023-01-21 VITALS
RESPIRATION RATE: 17 BRPM | WEIGHT: 182 LBS | TEMPERATURE: 97.5 F | BODY MASS INDEX: 25.38 KG/M2 | OXYGEN SATURATION: 97 % | HEART RATE: 63 BPM | DIASTOLIC BLOOD PRESSURE: 87 MMHG | SYSTOLIC BLOOD PRESSURE: 119 MMHG

## 2023-01-21 DIAGNOSIS — Z98.84 GASTRIC BYPASS STATUS FOR OBESITY: ICD-10-CM

## 2023-01-21 DIAGNOSIS — K90.829 SHORT GUT SYNDROME: ICD-10-CM

## 2023-01-21 DIAGNOSIS — G89.4 CHRONIC PAIN SYNDROME: ICD-10-CM

## 2023-01-21 DIAGNOSIS — R10.9 CHRONIC ABDOMINAL PAIN: ICD-10-CM

## 2023-01-21 DIAGNOSIS — G89.29 CHRONIC ABDOMINAL PAIN: ICD-10-CM

## 2023-01-21 DIAGNOSIS — J69.0 ASPIRATION PNEUMONIA OF RIGHT LOWER LOBE DUE TO VOMIT (H): ICD-10-CM

## 2023-01-21 DIAGNOSIS — Z78.9 ON TOTAL PARENTERAL NUTRITION: ICD-10-CM

## 2023-01-21 LAB
ALBUMIN SERPL BCG-MCNC: 4 G/DL (ref 3.5–5.2)
ALBUMIN UR-MCNC: NEGATIVE MG/DL
ALP SERPL-CCNC: 64 U/L (ref 40–129)
ALT SERPL W P-5'-P-CCNC: 19 U/L (ref 10–50)
ANION GAP SERPL CALCULATED.3IONS-SCNC: 10 MMOL/L (ref 7–15)
APPEARANCE UR: CLEAR
APTT PPP: 28 SECONDS (ref 22–38)
AST SERPL W P-5'-P-CCNC: 16 U/L (ref 10–50)
BASOPHILS # BLD AUTO: 0.1 10E3/UL (ref 0–0.2)
BASOPHILS NFR BLD AUTO: 1 %
BILIRUB SERPL-MCNC: 0.3 MG/DL
BILIRUB UR QL STRIP: NEGATIVE
BUN SERPL-MCNC: 16.1 MG/DL (ref 6–20)
CALCIUM SERPL-MCNC: 8.4 MG/DL (ref 8.6–10)
CHLORIDE SERPL-SCNC: 104 MMOL/L (ref 98–107)
COLOR UR AUTO: YELLOW
CREAT SERPL-MCNC: 0.67 MG/DL (ref 0.67–1.17)
DEPRECATED HCO3 PLAS-SCNC: 25 MMOL/L (ref 22–29)
EOSINOPHIL # BLD AUTO: 0.2 10E3/UL (ref 0–0.7)
EOSINOPHIL NFR BLD AUTO: 2 %
ERYTHROCYTE [DISTWIDTH] IN BLOOD BY AUTOMATED COUNT: 14.6 % (ref 10–15)
GFR SERPL CREATININE-BSD FRML MDRD: >90 ML/MIN/1.73M2
GLUCOSE SERPL-MCNC: 99 MG/DL (ref 70–99)
GLUCOSE UR STRIP-MCNC: NEGATIVE MG/DL
HCT VFR BLD AUTO: 37.8 % (ref 40–53)
HGB BLD-MCNC: 12.3 G/DL (ref 13.3–17.7)
HGB UR QL STRIP: ABNORMAL
IMM GRANULOCYTES # BLD: 0 10E3/UL
IMM GRANULOCYTES NFR BLD: 0 %
INR PPP: 1.09 (ref 0.85–1.15)
KETONES UR STRIP-MCNC: NEGATIVE MG/DL
LACTATE SERPL-SCNC: 0.8 MMOL/L (ref 0.7–2)
LEUKOCYTE ESTERASE UR QL STRIP: NEGATIVE
LIPASE SERPL-CCNC: 24 U/L (ref 13–60)
LYMPHOCYTES # BLD AUTO: 1.7 10E3/UL (ref 0.8–5.3)
LYMPHOCYTES NFR BLD AUTO: 13 %
MCH RBC QN AUTO: 30.5 PG (ref 26.5–33)
MCHC RBC AUTO-ENTMCNC: 32.5 G/DL (ref 31.5–36.5)
MCV RBC AUTO: 94 FL (ref 78–100)
MONOCYTES # BLD AUTO: 0.9 10E3/UL (ref 0–1.3)
MONOCYTES NFR BLD AUTO: 7 %
MUCOUS THREADS #/AREA URNS LPF: PRESENT /LPF
NEUTROPHILS # BLD AUTO: 9.9 10E3/UL (ref 1.6–8.3)
NEUTROPHILS NFR BLD AUTO: 77 %
NITRATE UR QL: NEGATIVE
NRBC # BLD AUTO: 0 10E3/UL
NRBC BLD AUTO-RTO: 0 /100
PH UR STRIP: 6.5 [PH] (ref 5–7)
PLATELET # BLD AUTO: 183 10E3/UL (ref 150–450)
POTASSIUM SERPL-SCNC: 3.7 MMOL/L (ref 3.4–5.3)
PROT SERPL-MCNC: 6.5 G/DL (ref 6.4–8.3)
RBC # BLD AUTO: 4.03 10E6/UL (ref 4.4–5.9)
RBC URINE: 5 /HPF
SODIUM SERPL-SCNC: 139 MMOL/L (ref 136–145)
SP GR UR STRIP: 1.02 (ref 1–1.03)
UROBILINOGEN UR STRIP-MCNC: NORMAL MG/DL
WBC # BLD AUTO: 12.8 10E3/UL (ref 4–11)
WBC URINE: 0 /HPF

## 2023-01-21 PROCEDURE — 85025 COMPLETE CBC W/AUTO DIFF WBC: CPT | Performed by: EMERGENCY MEDICINE

## 2023-01-21 PROCEDURE — 81001 URINALYSIS AUTO W/SCOPE: CPT | Performed by: EMERGENCY MEDICINE

## 2023-01-21 PROCEDURE — 36415 COLL VENOUS BLD VENIPUNCTURE: CPT | Performed by: EMERGENCY MEDICINE

## 2023-01-21 PROCEDURE — 85730 THROMBOPLASTIN TIME PARTIAL: CPT | Performed by: EMERGENCY MEDICINE

## 2023-01-21 PROCEDURE — 83690 ASSAY OF LIPASE: CPT | Performed by: EMERGENCY MEDICINE

## 2023-01-21 PROCEDURE — 85610 PROTHROMBIN TIME: CPT | Performed by: EMERGENCY MEDICINE

## 2023-01-21 PROCEDURE — 99285 EMERGENCY DEPT VISIT HI MDM: CPT | Mod: 25 | Performed by: EMERGENCY MEDICINE

## 2023-01-21 PROCEDURE — 87040 BLOOD CULTURE FOR BACTERIA: CPT | Performed by: EMERGENCY MEDICINE

## 2023-01-21 PROCEDURE — 99285 EMERGENCY DEPT VISIT HI MDM: CPT | Performed by: EMERGENCY MEDICINE

## 2023-01-21 PROCEDURE — 96374 THER/PROPH/DIAG INJ IV PUSH: CPT | Performed by: EMERGENCY MEDICINE

## 2023-01-21 PROCEDURE — 250N000011 HC RX IP 250 OP 636: Performed by: EMERGENCY MEDICINE

## 2023-01-21 PROCEDURE — 96375 TX/PRO/DX INJ NEW DRUG ADDON: CPT | Performed by: EMERGENCY MEDICINE

## 2023-01-21 PROCEDURE — 250N000013 HC RX MED GY IP 250 OP 250 PS 637: Performed by: EMERGENCY MEDICINE

## 2023-01-21 PROCEDURE — 96361 HYDRATE IV INFUSION ADD-ON: CPT | Performed by: EMERGENCY MEDICINE

## 2023-01-21 PROCEDURE — 80053 COMPREHEN METABOLIC PANEL: CPT | Performed by: EMERGENCY MEDICINE

## 2023-01-21 PROCEDURE — 71046 X-RAY EXAM CHEST 2 VIEWS: CPT

## 2023-01-21 PROCEDURE — 83605 ASSAY OF LACTIC ACID: CPT | Performed by: EMERGENCY MEDICINE

## 2023-01-21 PROCEDURE — 258N000003 HC RX IP 258 OP 636: Performed by: EMERGENCY MEDICINE

## 2023-01-21 RX ORDER — HEPARIN SODIUM (PORCINE) LOCK FLUSH IV SOLN 100 UNIT/ML 100 UNIT/ML
5-10 SOLUTION INTRAVENOUS
Status: DISCONTINUED | OUTPATIENT
Start: 2023-01-21 | End: 2023-01-21 | Stop reason: HOSPADM

## 2023-01-21 RX ORDER — OXYCODONE HCL 5 MG/5 ML
10 SOLUTION, ORAL ORAL ONCE
Status: COMPLETED | OUTPATIENT
Start: 2023-01-21 | End: 2023-01-21

## 2023-01-21 RX ORDER — ONDANSETRON 2 MG/ML
4 INJECTION INTRAMUSCULAR; INTRAVENOUS EVERY 30 MIN PRN
Status: DISCONTINUED | OUTPATIENT
Start: 2023-01-21 | End: 2023-01-21 | Stop reason: HOSPADM

## 2023-01-21 RX ORDER — CLINDAMYCIN PALMITATE HYDROCHLORIDE 75 MG/5ML
300 SOLUTION ORAL 3 TIMES DAILY
Qty: 300 ML | Refills: 0 | Status: SHIPPED | OUTPATIENT
Start: 2023-01-21 | End: 2023-01-26

## 2023-01-21 RX ORDER — FENTANYL CITRATE 50 UG/ML
100 INJECTION, SOLUTION INTRAMUSCULAR; INTRAVENOUS ONCE
Status: COMPLETED | OUTPATIENT
Start: 2023-01-21 | End: 2023-01-21

## 2023-01-21 RX ORDER — SODIUM CHLORIDE 9 MG/ML
INJECTION, SOLUTION INTRAVENOUS CONTINUOUS
Status: DISCONTINUED | OUTPATIENT
Start: 2023-01-21 | End: 2023-01-21 | Stop reason: HOSPADM

## 2023-01-21 RX ORDER — ONDANSETRON 4 MG/1
8 TABLET, ORALLY DISINTEGRATING ORAL ONCE
Status: DISCONTINUED | OUTPATIENT
Start: 2023-01-21 | End: 2023-01-21 | Stop reason: HOSPADM

## 2023-01-21 RX ORDER — HEPARIN SODIUM,PORCINE 10 UNIT/ML
5-10 VIAL (ML) INTRAVENOUS
Status: DISCONTINUED | OUTPATIENT
Start: 2023-01-21 | End: 2023-01-21 | Stop reason: HOSPADM

## 2023-01-21 RX ORDER — CALCIUM CARBONATE 500 MG/1
1000 TABLET, CHEWABLE ORAL ONCE
Status: COMPLETED | OUTPATIENT
Start: 2023-01-21 | End: 2023-01-21

## 2023-01-21 RX ORDER — ONDANSETRON 4 MG/1
4 TABLET, ORALLY DISINTEGRATING ORAL ONCE
Status: COMPLETED | OUTPATIENT
Start: 2023-01-21 | End: 2023-01-21

## 2023-01-21 RX ORDER — DIPHENHYDRAMINE HYDROCHLORIDE 50 MG/ML
25 INJECTION INTRAMUSCULAR; INTRAVENOUS ONCE
Status: COMPLETED | OUTPATIENT
Start: 2023-01-21 | End: 2023-01-21

## 2023-01-21 RX ORDER — HEPARIN SODIUM,PORCINE 10 UNIT/ML
5-10 VIAL (ML) INTRAVENOUS EVERY 24 HOURS
Status: DISCONTINUED | OUTPATIENT
Start: 2023-01-21 | End: 2023-01-21 | Stop reason: HOSPADM

## 2023-01-21 RX ADMIN — DIPHENHYDRAMINE HYDROCHLORIDE 25 MG: 50 INJECTION, SOLUTION INTRAMUSCULAR; INTRAVENOUS at 08:27

## 2023-01-21 RX ADMIN — ONDANSETRON 4 MG: 2 INJECTION INTRAMUSCULAR; INTRAVENOUS at 08:20

## 2023-01-21 RX ADMIN — CALCIUM CARBONATE (ANTACID) CHEW TAB 500 MG 1000 MG: 500 CHEW TAB at 08:30

## 2023-01-21 RX ADMIN — SODIUM CHLORIDE: 9 INJECTION, SOLUTION INTRAVENOUS at 08:32

## 2023-01-21 RX ADMIN — FENTANYL CITRATE 100 MCG: 50 INJECTION, SOLUTION INTRAMUSCULAR; INTRAVENOUS at 07:48

## 2023-01-21 RX ADMIN — ONDANSETRON 4 MG: 2 INJECTION INTRAMUSCULAR; INTRAVENOUS at 07:44

## 2023-01-21 RX ADMIN — PROCHLORPERAZINE EDISYLATE 10 MG: 5 INJECTION INTRAMUSCULAR; INTRAVENOUS at 08:30

## 2023-01-21 RX ADMIN — SODIUM CHLORIDE 1000 ML: 9 INJECTION, SOLUTION INTRAVENOUS at 07:48

## 2023-01-21 RX ADMIN — HYDROMORPHONE HYDROCHLORIDE 1 MG: 1 INJECTION, SOLUTION INTRAMUSCULAR; INTRAVENOUS; SUBCUTANEOUS at 08:20

## 2023-01-21 RX ADMIN — ONDANSETRON 4 MG: 4 TABLET, ORALLY DISINTEGRATING ORAL at 07:11

## 2023-01-21 RX ADMIN — OXYCODONE HYDROCHLORIDE 10 MG: 5 SOLUTION ORAL at 09:49

## 2023-01-21 ASSESSMENT — ACTIVITIES OF DAILY LIVING (ADL)
ADLS_ACUITY_SCORE: 37

## 2023-01-21 NOTE — DISCHARGE INSTRUCTIONS
Chest x-ray showed possible signs of aspiration pneumonia in your right lung.  This may have been from your recurrent vomiting.  You should start the antibiotic today and continue for a total of 5 days    Hopefully the fluids and medication we gave you first through the IV and then orally in the emergency room will get your symptoms under better control so you can continue to take your home medications    Follow-up with your primary provider and other specialist in clinic as needed    Return to the emergency room if any new or worsening symptoms develop

## 2023-01-21 NOTE — ED TRIAGE NOTES
Pt presents with persistent and worsening nausea and vomiting x2 days, Chronic back pain unrelieved with inability to keep anything down. Emesis x2 during triage. Denies fevers or cough, flu shot weeks ago, abdominal cramping present     Triage Assessment     Row Name 01/21/23 0707       Triage Assessment (Adult)    Airway WDL WDL       Respiratory WDL    Respiratory WDL WDL       Skin Circulation/Temperature WDL    Skin Circulation/Temperature WDL WDL       Cardiac WDL    Cardiac WDL WDL       Peripheral/Neurovascular WDL    Peripheral Neurovascular WDL WDL       Cognitive/Neuro/Behavioral WDL    Cognitive/Neuro/Behavioral WDL WDL

## 2023-01-21 NOTE — ED PROVIDER NOTES
History     Chief Complaint   Patient presents with     Flu Symptoms            Back Pain     HPI  Parker Acevedo is a 52 year old male who presents to the emergency room with persistent vomiting and worsening chronic pain.  Past medical history is significant for gastrojejunostomy, gastrectomy for severe ulcer disease, dysphagia, chronic pain syndrome, short gut syndrome, visceral hyperalgesia, dysphagia.  He has an indwelling catheter to receive TPN and hydration, and a J-tube to vent bile.  Is followed by pain management for his chronic pain, receives fentanyl patches and liquid oxycodone.  He says that overnight he started vomiting and cannot keep anything down including sips of water or any of his medications.  He is having pain in his abdomen and pain in his back.  Emesis has occasionally been bloody, but he says that he has a history of ulcers and this is not uncommon.  Has not been running a fever.  No cough or shortness of breath.  His central line was changed recently, and has been using it to provide TPN.  Denies any constipation or diarrhea.    Allergies:  Allergies   Allergen Reactions     Bactrim [Sulfamethoxazole W/Trimethoprim] Rash     Penicillins Anaphylaxis     Please see Antimicrobial Management Team allergy assessment note 10/10/2018. Patient reported tolerating amoxicillin.     Ertapenem Nausea and Vomiting     Doxycycline Rash     Vancomycin Rash     Rash after receiving vancomycin 3/28/16 (infusion reaction?). Tolerated with slower infusion and diphenhydramine premed.       Problem List:    Patient Active Problem List    Diagnosis Date Noted     Gram-negative bacteremia 07/07/2022     Priority: Medium     Bacteremia associated with intravascular line, initial encounter (H) 05/07/2022     Priority: Medium     Acute deep vein thrombosis (DVT) of axillary vein of right upper extremity (H) 02/28/2022     Priority: Medium     Fever, unknown origin 03/19/2021     Priority: Medium     Short  bowel syndrome 01/30/2021     Priority: Medium     Bloodstream infection associated with central venous catheter, initial encounter 01/30/2021     Priority: Medium     Gastric outlet obstruction 10/07/2020     Priority: Medium     Added automatically from request for surgery [id number]       Gram-positive bacteremia 09/29/2019     Priority: Medium     On total parenteral nutrition 09/29/2019     Priority: Medium     Added automatically from request for surgery [id number]       PICC line infiltration, sequela 07/22/2019     Priority: Medium     Infection by Candida species 01/04/2019     Priority: Medium     Bacteremia 10/10/2018     Priority: Medium     Hyperlipidemia LDL goal <130 08/07/2018     Priority: Medium     S/P bariatric surgery 07/30/2018     Priority: Medium     Generalized weakness 01/30/2018     Priority: Medium     Catheter-related bloodstream infection (CRBSI) 09/23/2017     Priority: Medium     Low serum iron 09/08/2017     Priority: Medium     Anemia, iron deficiency 09/08/2017     Priority: Medium     Abnormal echocardiogram 04/11/2017     Priority: Medium     Port or reservoir infection, initial encounter 03/16/2017     Priority: Medium     Status post cervical spinal arthrodesis 02/15/2017     Priority: Medium     Short gut syndrome      Priority: Medium     Anxiety      Priority: Medium     Chronic nausea 05/10/2016     Priority: Medium     Fungemia 04/11/2016     Priority: Medium     Positive blood culture 03/29/2016     Priority: Medium     Insomnia 08/13/2015     Priority: Medium     Chronic pain 07/07/2015     Priority: Medium     Patient is followed by Dr. Milligan for ongoing prescription of pain medication.  All refills should be approved by this provider, or covering partner.    Medication(s): Fentanyl 50 mcg patches every three days/ Liquid oxycodone 5 mg/5ml up to 50 mg daily.   Maximum quantity per month: as per Dr. Milligan through [facility]  Clinic visit frequency required: Q  3 months at Pain Management    Controlled substance agreement on file: Yes       Date(s): Through Pain Management    Pain Clinic evaluation in the past: Yes       Date(s):  Ongoing every three months       Location(s):  Per pain management    DIRE Total Score(s):  No flowsheet data found.    Last [facility] website verification:  Through Pain Management   [email or web address]    Esteban Daly MD             Health Care Home 02/24/2015     Priority: Medium              Iron deficiency 05/23/2014     Priority: Medium     Vitamin D deficiency 05/22/2014     Priority: Medium     Former smoker 02/24/2014     Priority: Medium     Bile reflux esophagitis 10/16/2013     Priority: Medium     Constipation 10/01/2013     Priority: Medium     Chronic anxiety 09/25/2013     Priority: Medium     Dysphagia 09/17/2013     Priority: Medium     Weight loss, non-intentional 09/17/2013     Priority: Medium     Malnutrition (H) 09/17/2013     Priority: Medium     Dehydration 08/28/2013     Priority: Medium     Vitamin B12 deficiency without anemia 07/31/2013     Priority: Medium     Diagnosis updated by automated process. Provider to review and confirm.       Thiamine deficiency 07/31/2013     Priority: Medium     ADHD (attention deficit hyperactivity disorder), inattentive type 07/02/2013     Priority: Medium     Patient is followed by [facility] for ongoing prescription of stimulants.  All refills should be approved by this provider, or covering partner.    Medication(s): Adderall.   Maximum quantity per month: 30  Clinic visit frequency required:      Controlled substance agreement on file: No  Neuropsych evaluation for ADD completed:  No    Last [facility] website verification:  done on 5/6/19  [email or web address]         Anemia 09/20/2012     Priority: Medium     Vomiting 06/28/2012     Priority: Medium     Chronic abdominal pain 06/01/2012     Priority: Medium     Patient is followed by ALEXANDRIA WHITLEY  for ongoing prescription of narcotic pain medicine.  Med: liquid oxycodone up to 60 mg per day.   Maximum use per month:   Expected duration: ongoing, hope per surgery that will resolve with upcoming surgery  Narcotic agreement on file: YES  Clinic visit recommended: Q 3 months         Peptic ulcer disease 04/08/2010     Priority: Medium     Gastric bypass status for obesity 04/08/2010     Priority: Medium     Top weight of 492.          Past Medical History:    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)      Chronic abdominal pain      CLABSI (central line-associated bloodstream infection)      Complication of anesthesia      Difficulty swallowing      Gastric ulcer, unspecified as acute or chronic, without mention of hemorrhage, perforation, or obstruction      Gastro-oesophageal reflux disease      Head injury      Hiatal hernia      Other bladder disorder      Other chronic pain      PONV (postoperative nausea and vomiting)      Severe malnutrition (H)      Short gut syndrome      Tobacco abuse        Past Surgical History:    Past Surgical History:   Procedure Laterality Date     AMPUTATION       APPENDECTOMY       BACK SURGERY  11/3/2014    curve in the spine     BIOPSY LYMPH NODE CERVICAL N/A 2/20/2015    Procedure: BIOPSY LYMPH NODE CERVICAL;  Surgeon: Baron Scanlon MD;  [facility]     CHOLECYSTECTOMY       COLONOSCOPY N/A 7/14/2021    Procedure: COLONOSCOPY;  Surgeon: Jimbo Estrada MD;  Location: [facility] OR     COLONOSCOPY N/A 4/13/2022    Procedure: COLONOSCOPY;  Surgeon: Jimbo Estrada MD;  Location: [facility] OR     DISCECTOMY, FUSION CERVICAL ANTERIOR ONE LEVEL, COMBINED N/A 2/15/2017    Procedure: COMBINED DISCECTOMY, FUSION CERVICAL ANTERIOR ONE LEVEL;  Surgeon: Darren Campos MD;  [facility]     ENDOSCOPIC INSERTION TUBE GASTROSTOMY  9/9/2013    Procedure:  ENDOSCOPIC INSERTION TUBE GASTROSTOMY;;  Surgeon: Francis Vyas MD;  [facility]     ENDOSCOPIC ULTRASOUND UPPER GASTROINTESTINAL TRACT (GI)  4/29/2011    Procedure:ENDOSCOPIC ULTRASOUND UPPER GASTROINTESTINAL TRACT (GI); Both Procedures done Conjointly; Surgeon:NEREIDA HOUSER; [facility]     ENDOSCOPIC ULTRASOUND UPPER GASTROINTESTINAL TRACT (GI)  9/9/2013    Procedure: ENDOSCOPIC ULTRASOUND UPPER GASTROINTESTINAL TRACT (GI);  Endoscopic Ultrasound Guide Gastrostomy Tube Placement  C-arm;  Surgeon: Noe Lizarraga MD;  [facility]     ENDOSCOPIC ULTRASOUND UPPER GASTROINTESTINAL TRACT (GI) N/A 2/24/2021    Procedure: ENDOSCOPIC ULTRASOUND, ESOPHAGOSCOPY / UPPER GASTROINTESTINAL TRACT (GI), esophagastrogastroduodenoscopy;  Surgeon: Berny Bach MD;  [facility]     ENDOSCOPY  03/25/11    EGD, [facility]     ENDOSCOPY  08/04/09    Upper Endoscopy, [facility]     ENDOSCOPY  01/05/09    Upper Endoscopy, [facility]     ESOPHAGOSCOPY, GASTROSCOPY, DUODENOSCOPY (EGD), COMBINED  4/20/2011    Procedure:COMBINED ESOPHAGOSCOPY, GASTROSCOPY, DUODENOSCOPY (EGD); Surgeon:FRANCIS VYAS; [facility]     ESOPHAGOSCOPY, GASTROSCOPY, DUODENOSCOPY (EGD), COMBINED  6/15/2011    Procedure:COMBINED ESOPHAGOSCOPY, GASTROSCOPY, DUODENOSCOPY (EGD); Surgeon:FRANCIS VYAS; [facility]     ESOPHAGOSCOPY, GASTROSCOPY, DUODENOSCOPY (EGD), COMBINED  6/12/2013    Procedure: COMBINED ESOPHAGOSCOPY, GASTROSCOPY, DUODENOSCOPY (EGD);;  Surgeon: Francis Vyas MD;  [facility]     ESOPHAGOSCOPY, GASTROSCOPY, DUODENOSCOPY (EGD), COMBINED  11/22/2013    Procedure: COMBINED ESOPHAGOSCOPY, GASTROSCOPY, DUODENOSCOPY (EGD);;  Surgeon: Francis Vyas MD;  [facility]     ESOPHAGOSCOPY, GASTROSCOPY, DUODENOSCOPY (EGD), COMBINED  4/30/2014    Procedure: COMBINED ESOPHAGOSCOPY, GASTROSCOPY, DUODENOSCOPY (EGD);  Surgeon: Francis Vyas MD;  [facility]     ESOPHAGOSCOPY, GASTROSCOPY, DUODENOSCOPY (EGD), COMBINED N/A 2/20/2015    Procedure: COMBINED ESOPHAGOSCOPY, GASTROSCOPY, DUODENOSCOPY (EGD), BIOPSY SINGLE OR MULTIPLE;  Surgeon: Baron Scanlon MD;  [facility]     ESOPHAGOSCOPY, GASTROSCOPY, DUODENOSCOPY (EGD), COMBINED N/A 9/30/2015    Procedure: COMBINED ESOPHAGOSCOPY, GASTROSCOPY, DUODENOSCOPY (EGD);  Surgeon: Francis Vyas MD;  Location:  GI     ESOPHAGOSCOPY, GASTROSCOPY, DUODENOSCOPY (EGD), COMBINED N/A 10/3/2019    Procedure: Upper Endoscopy;  Surgeon: Clif Morrow MD;  [facility]     GASTRECTOMY  6/22/2012    Procedure: GASTRECTOMY;  Open Approach, Excise Ulcers,Partial Gastrectomy, Esophagojejunostomy, Hiatal Hernia Repair, Extensive Lysis of Adhesions and Esaphagogastrodudenoscopy.;  Surgeon: Francis Vyas MD;  [facility]     GASTROJEJUNOSTOMY  08/26/09    Extensice enterolysis, partial resect. jejunum, part. resect gastric pouch, gastrojejunostomy anastomosis     HC ESOPH/GAS REFLUX TEST W NASAL IMPED ELECTRODE  8/5/2013    Procedure: ESOPHAGEAL IMPEDENCE FUNCTION TEST 1 HOUR OR LESS;  Surgeon: Halie Lang MD;  Location:  GI     HEAD & NECK SURGERY  2/15/2017    C5-C6     HERNIA REPAIR  2006    Umbilical hernia     HERNIORRHAPHY HIATAL  6/22/2012    Procedure: HERNIORRHAPHY HIATAL;;  Surgeon: Francis Vyas MD;  [facility]     HERNIORRHAPHY INGUINAL  11/22/2013    Procedure: HERNIORRHAPHY INGUINAL;;  Surgeon: Francis Vyas MD;  [facility]     INSERT PICC LINE Right 12/19/2019    Procedure: Picc Placement;  Surgeon: Per Dumont PA-C;  [facility]     INSERT PICC LINE Right 2/21/2020    Procedure: INSERTION, PICC;  Surgeon: Per Dumont PA-C;  [facility]     INSERT PORT VASCULAR ACCESS Right 12/19/2017    Procedure: INSERT PORT VASCULAR ACCESS;  Right Chest Port Placement ;  Surgeon: Lisandro Alejandro PA-C;  [facility]     INSERT PORT  VASCULAR ACCESS Right 8/2/2018    Procedure: INSERT PORT VASCULAR ACCESS;  Place single lumen tunneled central venous access catheter;  Surgeon: Guy Jamil PA-C;  [facility]     IR CVC TUNNEL PLACEMENT > 5 YRS OF AGE  8/7/2019     IR CVC TUNNEL PLACEMENT > 5 YRS OF AGE  4/14/2020     IR CVC TUNNEL PLACEMENT > 5 YRS OF AGE  8/3/2020     IR CVC TUNNEL PLACEMENT > 5 YRS OF AGE  9/4/2020     IR CVC TUNNEL PLACEMENT > 5 YRS OF AGE  2/5/2021     IR CVC TUNNEL PLACEMENT > 5 YRS OF AGE  3/23/2021     IR CVC TUNNEL PLACEMENT > 5 YRS OF AGE  1/13/2022     IR CVC TUNNEL PLACEMENT > 5 YRS OF AGE  5/12/2022     IR CVC TUNNEL PLACEMENT > 5 YRS OF AGE  7/13/2022     IR CVC TUNNEL REMOVAL RIGHT  10/1/2019     IR CVC TUNNEL REMOVAL RIGHT  7/30/2020     IR CVC TUNNEL REMOVAL RIGHT  9/2/2020     IR CVC TUNNEL REMOVAL RIGHT  2/3/2021     IR CVC TUNNEL REMOVAL RIGHT  3/19/2021     IR CVC TUNNEL REMOVAL RIGHT  1/10/2022     IR CVC TUNNEL REMOVAL RIGHT  5/9/2022     IR CVC TUNNEL REMOVAL RIGHT  7/8/2022     IR CVC TUNNEL REVISION LEFT  8/10/2022     IR CVC TUNNEL REVISION RIGHT  5/7/2021     IR FOLLOW UP VISIT OUTPATIENT  8/7/2019     IR PICC PLACEMENT > 5 YRS OF AGE  3/7/2019     IR PICC PLACEMENT > 5 YRS OF AGE  12/19/2019     IR PICC PLACEMENT > 5 YRS OF AGE  2/21/2020     LAPAROTOMY EXPLORATORY  11/22/2013    Procedure: LAPAROTOMY EXPLORATORY;  Exploratory Laparotomy, Upper Endoscopy, Left Inguinal Hernia Repair;  Surgeon: Francis Vyas MD;  [facility]     ORTHOPEDIC SURGERY       PICC INSERTION Right 03/16/2017    5fr DL BioFlo PICC, 42cm (3cm external) in the R medial brachial vein w/ tip in the SVC RA junction.     PICC INSERTION Left 09/23/2017    5fr DL BioFlo PICC, 45cm (1cm external) in the L basilic vein w/ tip in the SVC RA junction.     PICC INSERTION Right 05/16/2019    5Fr - 43cm, Medial brachial vein, low SVC     PICC INSERTION Right 10/02/2019    5Fr - 43cm (2cm external), basilic vein,  low SVC     SHAYLEE EN Y BOWEL  2003     SOFT TISSUE SURGERY       THORACIC SURGERY       TONSILLECTOMY       TRANSESOPHAGEAL ECHOCARDIOGRAM INTRAOPERATIVE N/A 1/8/2019    Procedure: TRANSESOPHAGEAL ECHOCARDIOGRAM INTRAOPERATIVE;  Surgeon: GENERIC ANESTHESIA PROVIDER;  Location:  OR     [facility] GASTRIC BYPASS,OBESE<100CM SHAYLEE-EN-Y  2002    lost 300 pounds       Family History:    Family History   Problem Relation Age of Onset     Gastrointestinal Disease Mother         Crohns disease     Anxiety Disorder Mother      Thyroid Disease Mother         Grave's disease     Cancer Father         ear cancer-skin cancer/melanoma     Breast Cancer Maternal Grandmother      Macular Degeneration Maternal Grandfather      Anxiety Disorder Sister      Diabetes Maternal Uncle      Breast Cancer Other      Hypertension No family hx of      Hyperlipidemia No family hx of      Cerebrovascular Disease No family hx of      Prostate Cancer No family hx of      Depression No family hx of      Anesthesia Reaction No family hx of      Asthma No family hx of      Osteoporosis No family hx of      Genetic Disorder No family hx of      Obesity No family hx of      Mental Illness No family hx of      Substance Abuse No family hx of      Glaucoma No family hx of        Social History:  Marital Status:   [2]  Social History     Tobacco Use     Smoking status: Light Smoker     Packs/day: 0.10     Years: 3.00     Pack years: 0.30     Types: Cigarettes     Smokeless tobacco: Never     Tobacco comments:     2/4/2021    smokes 3 cigarettes/day   Vaping Use     Vaping Use: Never used   Substance Use Topics     Alcohol use: No     Comment: quit 2002     Drug use: No        Medications:    clindamycin (CLEOCIN) 75 MG/5ML solution  albuterol (PROAIR HFA/PROVENTIL HFA/VENTOLIN HFA) 108 (90 Base) MCG/ACT inhaler  albuterol (PROAIR HFA/PROVENTIL HFA/VENTOLIN HFA) 108 (90 Base) MCG/ACT inhaler  amphetamine-dextroamphetamine (ADDERALL) 20 MG  "tablet  bisacodyl (DULCOLAX) 5 MG EC tablet  carvedilol (COREG) 6.25 MG tablet  cyanocobalamin (CYANOCOBALAMIN) 1000 MCG/ML injection  diphenhydrAMINE (BENADRYL) 50 MG capsule  enoxaparin ANTICOAGULANT (LOVENOX) 80 MG/0.8ML syringe  EPINEPHrine (ANY BX GENERIC EQUIV) 0.3 MG/0.3ML injection 2-pack  [facility] PATIENT  fentaNYL (DURAGESIC) 25 mcg/hr 72 hr patch  hydrOXYzine (VISTARIL) 25 MG capsule  insulin syringe-needle U-100 (29G X 1/2\" 1 ML) 29G X 1/2\" 1 ML miscellaneous  lactated ringers infusion  lidocaine (LIDODERM) 5 % patch  LORazepam (ATIVAN) 1 MG tablet  multivitamin, therapeutic (THERA-VIT) TABS tablet  naloxone (NARCAN) 4 MG/0.1ML nasal spray  nystatin (MYCOSTATIN) 321731 UNIT/GM external cream  ondansetron (ZOFRAN ODT) 8 MG ODT tab  oxyCODONE (ROXICODONE) 5 MG/5ML solution  pantoprazole (PROTONIX) 40 MG EC tablet  parenteral nutrition - PTA/DISCHARGE ORDER  polyethylene glycol (GOLYTELY) 236 g suspension  polyethylene glycol (MIRALAX) 17 GM/Dose powder  polyethylene glycol (MIRALAX) 17 GM/Dose powder  sucralfate (CARAFATE) 1 GM/10ML suspension  vitamin D2 (ERGOCALCIFEROL) 36943 units (1250 mcg) capsule          Review of Systems   All other systems reviewed and are negative.      Physical Exam   BP: 132/86  Pulse: 62  Temp: 97.5  F (36.4  C)  Resp: 17  Weight: 82.6 kg (182 lb)  SpO2: 98 %      Physical Exam  Vitals and nursing note reviewed.   Constitutional:       Appearance: He is ill-appearing. He is not diaphoretic.   HENT:      Head: Atraumatic.   Eyes:      General: No scleral icterus.     Pupils: Pupils are equal, round, and reactive to light.   Cardiovascular:      Rate and Rhythm: Normal rate and regular rhythm.      Heart sounds: Normal heart sounds.   Pulmonary:      Effort: Pulmonary effort is normal. No respiratory distress.      Breath sounds: Normal breath sounds.   Chest:       Abdominal:      General: Bowel sounds are normal.      Palpations: Abdomen is soft. There is " no mass.      Tenderness: There is generalized abdominal tenderness.       Musculoskeletal:         General: No tenderness.   Skin:     General: Skin is warm.      Findings: No rash.   Neurological:      Mental Status: He is alert.         ED Course                 Procedures              Critical Care time:  none               Results for orders placed or performed during the hospital encounter of 01/21/23 (from the past 24 hour(s))   CBC with platelets differential    Narrative    The following orders were created for panel order CBC with platelets differential.  Procedure                               Abnormality         Status                     ---------                               -----------         ------                     CBC with platelets and d...[[id number]]  Abnormal            Final result                 Please view results for these tests on the individual orders.   INR   Result Value Ref Range    INR 1.09 0.85 - 1.15   Partial thromboplastin time   Result Value Ref Range    aPTT 28 22 - 38 Seconds   Comprehensive metabolic panel   Result Value Ref Range    Sodium 139 136 - 145 mmol/L    Potassium 3.7 3.4 - 5.3 mmol/L    Chloride 104 98 - 107 mmol/L    Carbon Dioxide (CO2) 25 22 - 29 mmol/L    Anion Gap 10 7 - 15 mmol/L    Urea Nitrogen 16.1 6.0 - 20.0 mg/dL    Creatinine 0.67 0.67 - 1.17 mg/dL    Calcium 8.4 (L) 8.6 - 10.0 mg/dL    Glucose 99 70 - 99 mg/dL    Alkaline Phosphatase 64 40 - 129 U/L    AST 16 10 - 50 U/L    ALT 19 10 - 50 U/L    Protein Total 6.5 6.4 - 8.3 g/dL    Albumin 4.0 3.5 - 5.2 g/dL    Bilirubin Total 0.3 <=1.2 mg/dL    GFR Estimate >90 >60 mL/min/1.73m2   Lactic acid whole blood   Result Value Ref Range    Lactic Acid 0.8 0.7 - 2.0 mmol/L   Lipase   Result Value Ref Range    Lipase 24 13 - 60 U/L   CBC with platelets and differential   Result Value Ref Range    WBC Count 12.8 (H) 4.0 - 11.0 10e3/uL    RBC Count 4.03 (L) 4.40 - 5.90 10e6/uL    Hemoglobin 12.3 (L) 13.3 - 17.7  g/dL    Hematocrit 37.8 (L) 40.0 - 53.0 %    MCV 94 78 - 100 fL    MCH 30.5 26.5 - 33.0 pg    MCHC 32.5 31.5 - 36.5 g/dL    RDW 14.6 10.0 - 15.0 %    Platelet Count 183 150 - 450 10e3/uL    % Neutrophils 77 %    % Lymphocytes 13 %    % Monocytes 7 %    % Eosinophils 2 %    % Basophils 1 %    % Immature Granulocytes 0 %    NRBCs per 100 WBC 0 <1 /100    Absolute Neutrophils 9.9 (H) 1.6 - 8.3 10e3/uL    Absolute Lymphocytes 1.7 0.8 - 5.3 10e3/uL    Absolute Monocytes 0.9 0.0 - 1.3 10e3/uL    Absolute Eosinophils 0.2 0.0 - 0.7 10e3/uL    Absolute Basophils 0.1 0.0 - 0.2 10e3/uL    Absolute Immature Granulocytes 0.0 <=0.4 10e3/uL    Absolute NRBCs 0.0 10e3/uL   UA with Microscopic reflex to Culture    Specimen: Urine, Midstream   Result Value Ref Range    Color Urine Yellow Colorless, Straw, Light Yellow, Yellow    Appearance Urine Clear Clear    Glucose Urine Negative Negative mg/dL    Bilirubin Urine Negative Negative    Ketones Urine Negative Negative mg/dL    Specific Gravity Urine 1.020 1.003 - 1.035    Blood Urine Moderate (A) Negative    pH Urine 6.5 5.0 - 7.0    Protein Albumin Urine Negative Negative mg/dL    Urobilinogen Urine Normal Normal, 2.0 mg/dL    Nitrite Urine Negative Negative    Leukocyte Esterase Urine Negative Negative    Mucus Urine Present (A) None Seen /LPF    RBC Urine 5 (H) <=2 /HPF    WBC Urine 0 <=5 /HPF    Narrative    Urine Culture not indicated   XR Chest 2 Views    Narrative    EXAM: XR CHEST 2 VIEWS  LOCATION: [facility]  DATE/TIME: 1/21/2023 10:00 AM    INDICATION: Central line, vomiting, history of CLABSI  COMPARISON: 01/13/2023      Impression    IMPRESSION: Increased patchy right lower lobe airspace opacities concerning for pneumonia and/or aspiration. No pleural effusion or pneumothorax.    Normal cardiomediastinal silhouette. Left chest port catheter terminates at the SVC-RA junction.     *Note: Due to a large number of results and/or encounters for  the requested time period, some results have not been displayed. A complete set of results can be found in Results Review.       Medications   ondansetron (ZOFRAN ODT) ODT tab 4 mg (4 mg Oral Given 1/21/23 0711)   0.9% sodium chloride BOLUS (0 mLs Intravenous Stopped 1/21/23 0912)   fentaNYL (PF) (SUBLIMAZE) injection 100 mcg (100 mcg Intravenous Given 1/21/23 0748)   calcium carbonate (TUMS) chewable tablet 1,000 mg (1,000 mg Oral Given 1/21/23 0830)   HYDROmorphone (DILAUDID) injection 1 mg (1 mg Intravenous Given 1/21/23 0820)   prochlorperazine (COMPAZINE) injection 10 mg (10 mg Intravenous Given 1/21/23 0830)   diphenhydrAMINE (BENADRYL) injection 25 mg (25 mg Intravenous Given 1/21/23 0827)   oxyCODONE (ROXICODONE) solution 10 mg (10 mg Oral Given 1/21/23 0949)       Assessments & Plan (with Medical Decision Making)  Parker is a 52-year-old male with complex past medical history including numerous abdominal surgeries and chronic pain, presenting to the emergency room for persistent vomiting and inability to tolerate p.o., with escalating severity of his chronic pain.  See history of focused physical exam as  Chronically ill-appearing 52-year-old male in no acute respiratory distress, is vitally stable and afebrile.  He had episodes of emesis x2 in triage, is currently holding a blue emesis bag but nothing is in it.  Abdomen is soft, no masses appreciated with normal bowel sounds throughout, but he expresses tenderness to palpation in all quadrants.  He is moving back and forth on the bed and is extremely uncomfortable.  Will give IV fluids, antiemetics, and pain medication.  Suspect that because of the patient's persistent vomiting that his pain has gotten out of control since he has been unable to take his medication.  Do not see an indication for higher level imaging at this time  Patient improved with IV fluids, oral Tums, Compazine, Benadryl, Dilaudid.  He was willing to try his oral medications, so ordered  for 8 mg of ODT Zofran followed by 10 mg of oxycodone solution.  Patient tolerated this well and felt improved enough to go home.  Chest x-ray reveals right basilar infiltrate, concern for aspiration.  Through the patient's complex history, would like to cover him for an aspiration pneumonia.  He is unable to tolerate penicillins due to an anaphylactic reaction, despite having documentation in the chart reporting tolerance to amoxicillin.  Other options could include oral moxifloxacin versus clindamycin.  After consulting with pharmacy, moxifloxacin is available and tablets only, and patient does not feel like he would be able to tolerate a crushed tablet suspended in water.  Would also have concern that this bitter taste would cause him to further vomit and aspirate due to his complex history with dysphagia and other gastric issues.  Since does come as a solution, and is an alternative, will treat with clindamycin solution.  Prescription was sent to the pharmacy.  Patient has his home medications to continue for nausea, vomiting, and pain.  Advise close follow-up with his specialist and primary provider outside in clinic, but if worsening symptoms develop return promptly to the ER for evaluation.  He expresses understanding, significant other also expresses understanding, all questions were answered and discharged in no distress     I have reviewed the nursing notes.    I have reviewed the findings, diagnosis, plan and need for follow up with the patient.       Medical Decision Making  The patient presented with a problem that is a chronic illness severe exacerbation, progression, or side effect of treatment.    The patient's evaluation involved:  review of 1 prior external note(s) (see separate area of note for details)  ordering and review of 3+ test(s) (see separate area of note for details)    The patient's management involved prescription drug management.        Discharge Medication List as of 1/21/2023 11:38  AM      START taking these medications    Details   clindamycin (CLEOCIN) 75 MG/5ML solution Take 20 mLs (300 mg) by mouth 3 times daily for 5 days, Disp-300 mL, R-0, E-Prescribe             Final diagnoses:   Chronic abdominal pain   Short gut syndrome   On total parenteral nutrition   Gastric bypass status for obesity   Chronic pain syndrome   Aspiration pneumonia of right lower lobe due to vomit (H)       1/21/2023   [facility] EMERGENCY DEPT     Vikki Wren,   01/21/23 [id number]

## 2023-01-23 ENCOUNTER — PATIENT OUTREACH (OUTPATIENT)
Dept: CARE COORDINATION | Facility: CLINIC | Age: 53
End: 2023-01-23
Payer: COMMERCIAL

## 2023-01-23 ASSESSMENT — ACTIVITIES OF DAILY LIVING (ADL): DEPENDENT_IADLS:: TRANSPORTATION

## 2023-01-23 NOTE — PROGRESS NOTES
Clinic Care Coordination Contact    Follow Up Progress Note      Assessment: RN CC called and spoke to patient's wife Rose (consent to communicate on file). Patient is due for monthly outreach and goal check in. He was also in the emergency room over the weekend. Rose reports the patient is doing OK since his emergency room visit. Rose said the patient was vomiting uncontrollably. Rose said because patient was vomiting so much, none of his normal pain medications were working. Rose said the patient got 2 bags of fluids in the ER. She said he also got different pain medications and some vomiting medications. Rose said once they got the vomiting under control, the patient's pain medications started to work much better. Rose said the patient does not have extreme pain today and seems to be doing much better. Rose said the patient needs a follow up appointment with his Primary Care Provider so she needs to call the clinic and set that up. She said the patient has a follow up appointment with the pain doctor on 2/6/2023.       Care Gaps:    Health Maintenance Due   Topic Date Due     SPIROMETRY  Never done     COPD ACTION PLAN  Never done     HEPATITIS B IMMUNIZATION (1 of 3 - 3-dose series) Never done     MEDICARE ANNUAL WELLNESS VISIT  06/21/2017     COVID-19 Vaccine (3 - Booster for Moderna series) 10/04/2021     PHQ-2 (once per calendar year)  01/01/2023     ZOSTER IMMUNIZATION (1 of 2) 11/06/2022       Postponed to next PCP visit     Care Plans  Care Plan: Annual Wellness     Problem: Appointment     Goal: I will complete my annual wellness visit.     Start Date: 5/20/2022 Expected End Date: 10/2/2022    This Visit's Progress: 10% Recent Progress: 10%    Note:     Barriers: complex care, and high volume of appointments at baseline  Strengths: wife is pts main caregiver, and organizes his appointments.   Patient expressed understanding of goal: yes  Action steps to achieve this goal:  1. I will  schedule my annual wellness visit; [id number] ([phone])  2. I will attend my annual wellness visit.  3. I will contact my Care Management or clinic team if I have barriers to attending my annual wellness visit.                          Intervention/Education provided during outreach: As above. CC role, goal(s), clinic after hours, appointments, discussed/reviewed. Support provided.     Outreach Frequency: monthly    Plan:   1. Rose will call the clinic and schedule an ER follow up visit.   2. Patient will take all medications as prescribed.   3. Patient will see the pain doctor on 2/6/2023.   4. Patient/caregiver will call RN CC with questions, concerns, support needs. RN CC will be available as needed.     Care Coordinator will follow up in 1 month.     Kinsey Muhammad RN Care Coordination   [facility]  Email: [email or web address]  Phone: [phone]

## 2023-01-24 ENCOUNTER — TELEPHONE (OUTPATIENT)
Dept: INTERNAL MEDICINE | Facility: CLINIC | Age: 53
End: 2023-01-24
Payer: COMMERCIAL

## 2023-01-24 DIAGNOSIS — R19.8 VISCERAL HYPERALGESIA: ICD-10-CM

## 2023-01-24 DIAGNOSIS — G89.4 CHRONIC PAIN SYNDROME: ICD-10-CM

## 2023-01-24 DIAGNOSIS — R10.9 CHRONIC ABDOMINAL PAIN: ICD-10-CM

## 2023-01-24 DIAGNOSIS — G89.29 CHRONIC ABDOMINAL PAIN: ICD-10-CM

## 2023-01-24 DIAGNOSIS — F90.0 ADHD (ATTENTION DEFICIT HYPERACTIVITY DISORDER), INATTENTIVE TYPE: ICD-10-CM

## 2023-01-24 DIAGNOSIS — R11.0 NAUSEA: ICD-10-CM

## 2023-01-24 NOTE — TELEPHONE ENCOUNTER
Reason for Call:  Form, our goal is to have forms completed with 72 hours, however, some forms may require a visit or additional information.    Type of letter, form or note:  Home Health Certification    Who is the form from?: Home care    Where did the form come from: form was faxed in    What clinic location was the form placed at?: [facility]    Where the form was placed: Given to MA/CHRISTY Rivera    What number is listed as a contact on the form?: [phone]       Additional comments: [facility]    Call taken on 1/24/2023 at 4:14 PM by Kristin Jaimes

## 2023-01-25 DIAGNOSIS — R10.9 CHRONIC ABDOMINAL PAIN: ICD-10-CM

## 2023-01-25 DIAGNOSIS — G89.29 CHRONIC ABDOMINAL PAIN: ICD-10-CM

## 2023-01-25 DIAGNOSIS — G89.4 CHRONIC PAIN SYNDROME: ICD-10-CM

## 2023-01-25 DIAGNOSIS — R19.8 VISCERAL HYPERALGESIA: ICD-10-CM

## 2023-01-25 RX ORDER — OXYCODONE HCL 5 MG/5 ML
5-10 SOLUTION, ORAL ORAL 4 TIMES DAILY PRN
Qty: 1200 ML | Refills: 0 | Status: SHIPPED | OUTPATIENT
Start: 2023-01-25 | End: 2023-04-05

## 2023-01-25 RX ORDER — FENTANYL 25 UG/1
1 PATCH TRANSDERMAL
Qty: 15 PATCH | Refills: 0 | Status: SHIPPED | OUTPATIENT
Start: 2023-01-25 | End: 2023-03-20

## 2023-01-25 RX ORDER — DEXTROAMPHETAMINE SACCHARATE, AMPHETAMINE ASPARTATE, DEXTROAMPHETAMINE SULFATE AND AMPHETAMINE SULFATE 5; 5; 5; 5 MG/1; MG/1; MG/1; MG/1
TABLET ORAL
Qty: 30 TABLET | Refills: 0 | Status: SHIPPED | OUTPATIENT
Start: 2023-01-25 | End: 2023-02-22

## 2023-01-25 RX ORDER — ONDANSETRON 8 MG/1
TABLET, ORALLY DISINTEGRATING ORAL
Qty: 90 TABLET | Refills: 1 | Status: SHIPPED | OUTPATIENT
Start: 2023-01-25 | End: 2023-04-21

## 2023-01-25 RX ORDER — LORAZEPAM 1 MG/1
TABLET ORAL
Qty: 30 TABLET | Refills: 0 | OUTPATIENT
Start: 2023-01-25

## 2023-01-25 NOTE — TELEPHONE ENCOUNTER
"Requested Prescriptions   Pending Prescriptions Disp Refills    amphetamine-dextroamphetamine (ADDERALL) 20 MG tablet 30 tablet 0     Sig: TAKE ONE TABLET BY MOUTH ONCE DAILY       There is no refill protocol information for this order      Signed Prescriptions Disp Refills    ondansetron (ZOFRAN ODT) 8 MG ODT tab 90 tablet 1     Sig: DISSOLVE ONE TABLET ON TONGUE EVERY 8 HOURS AS NEEDED FOR NAUSEA        Antivertigo/Antiemetic Agents Passed - 1/24/2023  2:06 PM        Passed - Recent (12 mo) or future (30 days) visit within the authorizing provider's specialty     Patient has had an office visit with the authorizing provider or a provider within the authorizing providers department within the previous 12 mos or has a future within next 30 days. See \"Patient Info\" tab in inbasket, or \"Choose Columns\" in Meds & Orders section of the refill encounter.              Passed - Medication is active on med list        Passed - Patient is 18 years of age or older         Refused Prescriptions Disp Refills    LORazepam (ATIVAN) 1 MG tablet [Pharmacy Med Name: LORAZEPAM 1MG TABS] 30 tablet 0     Sig: TAKE 1 TABLET BY MOUTH ONCE DAILY AS NEEDED FOR ANXIETY WITH TPN AND MEDICATIONS. PRESCRIBER WILL FILL INCREASED QUANTITY WITH NEXT FILL.       There is no refill protocol information for this order            "

## 2023-01-25 NOTE — TELEPHONE ENCOUNTER
Signed Prescriptions:                        Disp   Refills    fentaNYL (DURAGESIC) 25 mcg/hr 72 hr patch 15 pat*0        Sig: Place 1 patch onto the skin every 48 hours remove old           patch. Fill 01/31/23 and start 02/02/23. 30 days           for chronic pain.  Authorizing Provider: NATALIE MCDOWELL    oxyCODONE (ROXICODONE) 5 MG/5ML solution   1200 mL0        Sig: Take 5-10 mLs (5-10 mg) by mouth 4 times daily as           needed for pain Max of 40mg/day. Put at least 4           hours between doses.  Fill 01/31/23 and start           02/02/23. 30 days for chronic pain.  Authorizing Provider: NATALIE MCDOWELL        Reviewed [place]  January 25, 2023- no concerning fills.    Natalie MENARD, RN CNP, FNP  [facility]  [facility] [place] [facility]

## 2023-01-25 NOTE — TELEPHONE ENCOUNTER
Prescription approved per [facility] Refill Protocol.    Angel Thomas,BSN, RN

## 2023-01-25 NOTE — TELEPHONE ENCOUNTER
Received call from patient requesting refill(s) of oxyCODONE (ROXICODONE) 5 MG/5ML solution    Last dispensed from pharmacy on 12/30/22    Patient's last office/virtual visit by prescribing provider on 11/09/22  Next office/virtual appointment scheduled for 02/06/23    Last urine drug screen date 11/09/22  Current opioid agreement on file (completed within the last year) Yes Date of opioid agreement: 11/09/22    E-prescribe to pharmacy-[facility] - [place], [place] - [address]     Will route to nursing [place] for review and preparation of prescription(s).

## 2023-01-25 NOTE — TELEPHONE ENCOUNTER
Medication refill information reviewed.     Due date for oxyCODONE (ROXICODONE) 5 MG/5ML solution 02/02/23 and fentaNYL (DURAGESIC) 25 mcg/hr 72 hr patch is 02/02/23     Prescriptions prepped for review.     Will route to provider.

## 2023-01-26 ENCOUNTER — TELEPHONE (OUTPATIENT)
Dept: ENDOCRINOLOGY | Facility: CLINIC | Age: 53
End: 2023-01-26
Payer: COMMERCIAL

## 2023-01-26 DIAGNOSIS — F90.0 ADHD (ATTENTION DEFICIT HYPERACTIVITY DISORDER), INATTENTIVE TYPE: ICD-10-CM

## 2023-01-26 LAB
BACTERIA BLD CULT: NO GROWTH
BACTERIA BLD CULT: NO GROWTH

## 2023-01-26 NOTE — TELEPHONE ENCOUNTER
Patient's medication reconciliation is completed by RN today, 01/26/23.  Forms placed in Dr. Isaac's basket for signature.  Aparna Sr RN    Certification period: 01/21/23 to 03/21/23.

## 2023-01-26 NOTE — TELEPHONE ENCOUNTER
Sonali with [facility] calling regarding patients azevedo dressing change. They have been attempting to get in contact with the patient to change his dressing since Tuesday. Visit was refused by patient, then patients wife stated that they had a family emergency. They have attempted again today & patient has not gotten back to them.     [phone] Sonali with [facility]

## 2023-01-26 NOTE — TELEPHONE ENCOUNTER
Spoke to patient's spouse (Rose) , notified that prescriptions were sent to preferred pharmacy.    Able to fill 01/31/23 and start 02/02/23      Deidra Banks MA  [facility] [place]

## 2023-01-27 RX ORDER — LORAZEPAM 1 MG/1
TABLET ORAL
Qty: 30 TABLET | Refills: 0 | Status: SHIPPED | OUTPATIENT
Start: 2023-01-27 | End: 2023-02-22

## 2023-01-27 NOTE — TELEPHONE ENCOUNTER
Home health agency has been trying to get a hold of patient for dressing changes.  States wife has declined visits on Tuesday and Wednesday.  Had told home care they had a family emergency.  Wanted provider to know what was going on.    I attempted to contact patient and left a message.

## 2023-01-29 ENCOUNTER — MYC MEDICAL ADVICE (OUTPATIENT)
Dept: INTERNAL MEDICINE | Facility: CLINIC | Age: 53
End: 2023-01-29
Payer: COMMERCIAL

## 2023-01-29 DIAGNOSIS — R93.1 ABNORMAL ECHOCARDIOGRAM: ICD-10-CM

## 2023-01-30 RX ORDER — CARVEDILOL 6.25 MG/1
12.5 TABLET ORAL 2 TIMES DAILY WITH MEALS
Qty: 60 TABLET | Refills: 3 | Status: SHIPPED | OUTPATIENT
Start: 2023-01-30 | End: 2023-04-21

## 2023-02-06 ENCOUNTER — MYC MEDICAL ADVICE (OUTPATIENT)
Dept: INTERNAL MEDICINE | Facility: CLINIC | Age: 53
End: 2023-02-06

## 2023-02-06 ENCOUNTER — VIRTUAL VISIT (OUTPATIENT)
Dept: PALLIATIVE MEDICINE | Facility: CLINIC | Age: 53
End: 2023-02-06
Payer: COMMERCIAL

## 2023-02-06 ENCOUNTER — TELEPHONE (OUTPATIENT)
Dept: PALLIATIVE MEDICINE | Facility: CLINIC | Age: 53
End: 2023-02-06

## 2023-02-06 DIAGNOSIS — R10.9 CHRONIC ABDOMINAL PAIN: ICD-10-CM

## 2023-02-06 DIAGNOSIS — R19.8 VISCERAL HYPERALGESIA: Primary | ICD-10-CM

## 2023-02-06 DIAGNOSIS — M79.18 MYOFASCIAL PAIN: ICD-10-CM

## 2023-02-06 DIAGNOSIS — G89.29 CHRONIC BILATERAL LOW BACK PAIN WITHOUT SCIATICA: ICD-10-CM

## 2023-02-06 DIAGNOSIS — G89.4 CHRONIC PAIN SYNDROME: ICD-10-CM

## 2023-02-06 DIAGNOSIS — M54.2 CERVICALGIA: ICD-10-CM

## 2023-02-06 DIAGNOSIS — F11.90 CHRONIC, CONTINUOUS USE OF OPIOIDS: ICD-10-CM

## 2023-02-06 DIAGNOSIS — G89.29 CHRONIC ABDOMINAL PAIN: ICD-10-CM

## 2023-02-06 DIAGNOSIS — M54.50 CHRONIC BILATERAL LOW BACK PAIN WITHOUT SCIATICA: ICD-10-CM

## 2023-02-06 PROCEDURE — 99214 OFFICE O/P EST MOD 30 MIN: CPT | Mod: 95 | Performed by: NURSE PRACTITIONER

## 2023-02-06 RX ORDER — OXYCODONE HCL 5 MG/5 ML
5-10 SOLUTION, ORAL ORAL EVERY 6 HOURS PRN
Qty: 1200 ML | Refills: 0 | Status: SHIPPED | OUTPATIENT
Start: 2023-02-06 | End: 2023-03-20

## 2023-02-06 RX ORDER — FENTANYL 25 UG/1
1 PATCH TRANSDERMAL
Qty: 15 PATCH | Refills: 0 | Status: SHIPPED | OUTPATIENT
Start: 2023-02-06 | End: 2023-03-20

## 2023-02-06 RX ORDER — LIDOCAINE 50 MG/G
1-2 PATCH TOPICAL EVERY 24 HOURS
Qty: 60 PATCH | Refills: 6 | Status: SHIPPED | OUTPATIENT
Start: 2023-02-06 | End: 2023-05-23

## 2023-02-06 RX ORDER — POLYETHYLENE GLYCOL 3350 17 G/17G
17 POWDER, FOR SOLUTION ORAL DAILY
Qty: 1020 G | Refills: 3 | Status: SHIPPED | OUTPATIENT
Start: 2023-02-06 | End: 2023-11-07

## 2023-02-06 ASSESSMENT — PAIN SCALES - GENERAL: PAINLEVEL: SEVERE PAIN (7)

## 2023-02-06 NOTE — PROGRESS NOTES
Parker is a 52 year old who is being evaluated via a billable telephone visit.      What phone number would you like to be contacted at? [phone]  How would you like to obtain your AVS? Ellyhart   Is Pt currently in [place]? Yes     Lyndsay Solo MA      NOTE:  If Pt is not in [place], Appointment needs to be canceled and rescheduled.      Distant Location (provider location):  On-site    [facility]    2/6/2023    Chief complaint:   Ongoing abdominal pain        Interval history:  Parker Acevedo is a 52 year old male is known to me for:  Visceral hyperalgesia  Chronic abdominal pain  Chronic pain syndrome  PMHx includes: ADHD, anxiety, cardiomyopathy and nutritional diseases, chronic abdominal pain, central line associated bloodstream infection recurrent, anesthesia complication, difficulty swallowing, gastric ulcer unspecified as acute or chronic without mention of hemorrhage perforation or obstruction, gastroesophageal reflux disease, head injury, hiatal hernia, other bladder disorder, other chronic pain, postop nausea and vomiting, severe malnutrition on TPN, short gut syndrome, tobacco abuse  PSHx includes: Appendectomy, back surgery (11/3/2014), biopsy of cervical lymph node (2/20/2015), cholecystectomy, colonoscopy (2021, 2022), cervical anterior 1 level discectomy and fusion (2/15/2017), endoscopic insertion tube gastrostomy (9/9/2013), endoscopic ultrasound upper gastrointestinal tract (4/29/2011), endoscopic ultrasound upper gastrointestinal tract (9/9/2013), endoscopic ultrasound upper gastrointestinal tract (2/24/2021), endoscopy (3/25/2011, 8/4/2009, 1/5/2009), multiple EGDs, gastrectomy (6/22/2012), gastrojejunostomy (8/26/2009), umbilical hernia repair (2006), hernia raphe hiatal (6/22/2012), herniorrhaphy inguinal (11/22/2013), insert PICC line on the right (12/19/2019), insert PICC line right (2/21/2020), insert vascular access port on the right (12/19/2017, 8/2/2018),  multiple interventional radiology CVC tunnel placement and removals, exploratory laparotomy (11/22/2013), orthopedic surgery, Celestino-en-Y gastric bypass (2003), tonsillectomy.        Recommendations/plan at the last visit on 11/9/2022 included:  1. Physical Therapy: none  2. Clinical Health Psychologist to address issues of relaxation, behavioral change, coping style, and other factors important to improvement: none  3. Continue gentle movement at home  4. Diagnostic Studies: none  5. Medication Management:   1. Continue fentanyl patch 25mcg/hr every 48 hours, fill 12/1 and start 12/4  2. Oxycodone liquid 5mg/5ml,  use 5-10mg (5-10mls) every 4 hours as needed, max of 40mg (40ml) per day. Fill 12/1 and start 12/4.   3. Sent in refill for naloxone for opiate reversal  6. Further procedures recommended: none  7. Acupuncture: I am fine with this  8. Urine toxicology screen today: today, he is wearing the Fentanyl patch and took oxycodone this morning at 9AM  9. Signed CSA with me today  10. Recommendations/follow-up for PCP:  See above  11. Release of information: none  12. Follow up: 10-12 weeks for in-person visit. Please call [phone] to make your follow-up appointment with me.         Since his last visit, Parker Acevedo reports:    Interval history February 6, 2023  -struck his head on a cupboard reaching for a glass in the kitchen and cracked his head, had to get stiches above his eye in the forehead.   -he states he didn't have a work up for a concussion.   -he has had more headaches since striking his head.   -encouraged him to see his Primary Care Provider   -he has had nausea prior to the head injury.   -he has had nausea and vomiting that comes and goes. It is not worse now after the head injury.   -he has a lot of swelling around the laceration per patient report, I encouraged him to be seen in urgent care for evaluation.       Interval history November 9, 2022  -Parker has ongoing abdominal pain  -he  "relates that the recheck for Hep B was negative  -he is stable on his current regimen of fentanyl patch and oxycodone liquid    Interval history August 23, 2022  -abdominal pain, pretty bad today, ID will recheck him for Hep B and are doing some updated imaging.   -insurance adjusters at his home now for floyd issues  -left knee pain after slip and fall, seen in ER on 8/9/2022 and in immobilizer, will follow up with ortho.     Pain history collected at initial visit on 5/27/2022  He had gastric bypass in 2003 and about 5 years after that, he developed gastric ulcers. He ended up having surgery for gastric ulcer and hernias and such. He has had over 11 surgeries for his abdomen. He is malnourished due to short-gut syndrome. He has a J-tube that is open to vent bile from his stomach as he gets bloated.   Worst pain is inside the abdominal cavity. He will have pain so bad that he states he screams for his mother. He feels that this is a deep inside pain.   -he would like to increase his oxycodone dose by one dose per day. His activity is limited by not having medication to cover him for his daily activities.      -he has a DVT in his right arm and in his right jugular vein. He is on lovenox.         At this point, the patient's participation with our multidisciplinary team includes:  The patient has been compliant with the program.  PT - none  Health Psych - none      Pain scores:  Pain intensity on average is 6-7 on a scale of 0-10.    Range is 4-10+/10. He will get to a 10+ if he misses his medication  Pain right now is 5-6/10.   Pain is described as \"sharp, burning, agonizing and like on fire or like pirahnas gnawing me inside out,  Can bee a really deep, hard ache.\"    Pain is constant in nature    Current pain relevant medications:   -tylenol 32mg/ml liquid take 15.65mls (500mg) Q 4 hours PRN fever/mild pain  (somewhat helpful for headaches)  --Duragesic 25mcg/hr Q 48 hours (helpful)  -lidoderm 5% patch PRN " ()  -Narcan nasal spray for opiate reversal   -Oxycodone 5mg/5ml solution take 5-10mls (5-10mg) TID PRN max of 30mg/day with 4 hours between doses.      Other pertinent medications:  -Adderall 20mg every day  -LOVENOX 120mg Subcutaneously every day  -lorazepam 1mg for anxiety with TPN and meds once per day (uses most nights)  -Zofran 8mg Q 8 hours PRN     Previous medication treatments included:  OPIATES: Fentanyl (helpful), morphine (hallucinations), Dilaudid (not helpful, did not dissolve), Tylenol #3 (not helpful), hydrocodone (not helpful), Belbuca (not helpful--he had a really heavy chest feeling)  NSAIDS: cannot take due to severe gastric ulcer disease  MUSCLE RELAXANTS: none  ANTI-MIGRAINE MEDS: none  ANTI-DEPRESSANTS: none  SLEEP AIDS: benadryl (helpful)  ANTI-CONVULSANTS: gabapentin (bad headaches and acid reflux),  TOPICALS: Lidocaine patches (helpful)  ANXIOLYTICS: valium (helpful 5mg dosage), lorazepam (helpful)  MEDICAL CANNABIS: not interested  Other meds: tylenol (helpful for headaches)        Other treatments have included:  Parker Acevedo has been seen at a pain clinic in the past.  Previously managed here by Dr. Jessica Milligan. Also seen by [facility] for possible implanted intrathecal pain pump, he is not candidate due to infection risk.   PT: tried, never helped his neck or abdominal surgery  Massage Therapy: helpful for a day or two  Chiropractic care: tried, helped some   Acupuncture: tried, not helpful  TENs Unit: tried, not helpful  Healing Touch: not helpful     Injections:   -previously had injections for his neck with Dr. Jessica Milligan        THE 4 A's OF OPIOID MAINTENANCE ANALGESIA    Analgesia: keeps pain pretty managable    Activity: he walks daily, light housekeeping    Adverse effects: none    Adherence to Rx protocol: yes      Side Effects: no side effect  Patient is using the medication as prescribed: YES    Medications:  Current Outpatient Medications   Medication Sig Dispense Refill  "    albuterol (PROAIR HFA/PROVENTIL HFA/VENTOLIN HFA) 108 (90 Base) MCG/ACT inhaler Inhale 2 puffs into the lungs every 6 hours as needed 18 g 1     amphetamine-dextroamphetamine (ADDERALL) 20 MG tablet TAKE ONE TABLET BY MOUTH ONCE DAILY 30 tablet 0     carvedilol (COREG) 6.25 MG tablet Take 2 tablets (12.5 mg) by mouth 2 times daily (with meals) 60 tablet 3     cyanocobalamin (CYANOCOBALAMIN) 1000 MCG/ML injection INJECT 1 ML INTO THE MUSCLE EVERY 30 DAYS 1 mL 3     diphenhydrAMINE (BENADRYL) 50 MG capsule Take 50 mg by mouth 2 times daily (Patient not taking: Reported on 1/13/2023)       enoxaparin ANTICOAGULANT (LOVENOX) 80 MG/0.8ML syringe INJECT THE CONTENTS OF ONE SYRINGE (80MG) UNDER THE SKIN TWO TIMES A DAY 67.2 mL 0     EPINEPHrine (ANY BX GENERIC EQUIV) 0.3 MG/0.3ML injection 2-pack        [facility] PATIENT Contact [facility] for patient specific medication information at [phone] on admission and discharge from the hospital.  Phones are answered 24 hours a day 7 days a week 365 days a year.    Providers - Choose \"CONTINUE HOME MED (no script)\" at discharge if patient treatment with home infusion will continue.       fentaNYL (DURAGESIC) 25 mcg/hr 72 hr patch Place 1 patch onto the skin every 48 hours remove old patch. Fill 01/31/23 and start 02/02/23. 30 days for chronic pain. 15 patch 0     hydrOXYzine (VISTARIL) 25 MG capsule 25-50mg at night 30 capsule 1     insulin syringe-needle U-100 (29G X 1/2\" 1 ML) 29G X 1/2\" 1 ML miscellaneous Use to inject b12 every 30 days 3 each 4     lactated ringers infusion Inject 1,000-2,000 mLs into the vein daily as needed       lidocaine (LIDODERM) 5 % patch Place 1-2 patches onto the skin every 24 hours Wear for 12 hours, remove for 12 hours.  OK to cut to better fit to size. 60 patch 6     LORazepam (ATIVAN) 1 MG tablet TAKE ONE TABLET BY MOUTH ONCE DAILY AS NEEDED FOR ANXIETY WITH TPN AND MEDICATIONS (#30 TO LAST 30 DAYS) 30 " "tablet 0     multivitamin, therapeutic (THERA-VIT) TABS tablet Take 1 tablet by mouth daily       naloxone (NARCAN) 4 MG/0.1ML nasal spray Spray 1 spray (4 mg) into one nostril alternating nostrils once as needed for opioid reversal every 2-3 minutes until assistance arrives (Patient not taking: Reported on 1/13/2023) 0.2 mL 0     nystatin (MYCOSTATIN) 000780 UNIT/GM external cream Apply topically 2 times daily 30 g 0     ondansetron (ZOFRAN ODT) 8 MG ODT tab DISSOLVE ONE TABLET ON TONGUE EVERY 8 HOURS AS NEEDED FOR NAUSEA 90 tablet 1     oxyCODONE (ROXICODONE) 5 MG/5ML solution Take 5-10 mLs (5-10 mg) by mouth 4 times daily as needed for pain Max of 40mg/day. Put at least 4 hours between doses.  Fill 01/31/23 and start 02/02/23. 30 days for chronic pain. 1200 mL 0     pantoprazole (PROTONIX) 40 MG EC tablet Take 1 tablet (40 mg) by mouth daily 30 tablet 5     parenteral nutrition - PTA/DISCHARGE ORDER Patient receives 2050 mL TPN every 14 hours through PICC at [facility].       polyethylene glycol (GOLYTELY) 236 g suspension 2 days prior at 5pm, mix and drink half of a jug of Golytely. Drink an 8 oz. glass of Golytely every 15 minutes until half of the jug is gone. Place remainder of Golytely in the refrigerator. 1 day prior at 5 pm, drink the 2nd half of a jug of Golytely bowel prep. 6 hours before your check-in time, drink an 8 oz. glass of Golytely every 15 minutes until half of the 2nd jug of Golytely is gone. Discard remainder of second jug. (Patient not taking: Reported on 1/13/2023) 8000 mL 0     polyethylene glycol (MIRALAX) 17 GM/Dose powder Take 17 g (1 capful) by mouth 2 times daily starting 7 days prior to colonoscopy-Refer to \"Extended Colonoscopy Prep\" handout. 238 g 0     polyethylene glycol (MIRALAX) 17 GM/Dose powder Take 17 g by mouth daily 1020 g 3     sucralfate (CARAFATE) 1 GM/10ML suspension TAKE 10MLS  BY MOUTH FOUR TIMES A DAY AS NEEDED 420 mL 1     vitamin D2 (ERGOCALCIFEROL) " 57079 units (1250 mcg) capsule TAKE 1 CAPSULE (50,000 UNITS) BY MOUTH ONCE A WEEK 12 capsule 3       Medical History: any changes in medical history since they were last seen? No    Social History:   Home situation:  to Vandana, one son in late 20's   Occupation/Schooling: he used to work at [employer]. He is on disability, SSDI  Tobacco use: smokes 1/2 ppd, discussed smoking cessation today, he is not ready at present time  Alcohol use: none  Drug use: none  History of chemical dependency treatment: none    Is patient a current smoker or tobacco user?  Down to 3-4 cigarettes per day  If yes, was cessation counseling offered?  Yes          Physical Exam:  Vital signs: There were no vitals taken for this visit.    Behavioral observations:  Awake, alert. Cooperative.   Pulm: respirations easy and unlabored. Able to speak in full sentences without SOB or cough noted.                 Diagnostic tests:  CT ABDOMEN PELVIS W CONTRAST  2/16/2019 3:00 AM      HISTORY: Right-sided abdominal pain, status post gastric bypass.  Patient has G-tube with blood and history of ulcers.     TECHNIQUE: CT abdomen and pelvis with 85 mL Isovue-370 IV. Radiation  dose for this scan was reduced using automated exposure control,  adjustment of the mA and/or kV according to patient size, or iterative  reconstruction technique.     COMPARISON: 1/13/2015.     FINDINGS:  Abdomen: There is mild dependent atelectasis at the lung bases. The  heart size is normal. Small hiatal hernia. There is mild biliary  dilatation into the pancreas head which is probably normal post  cholecystectomy. The liver otherwise appears normal. The gallbladder  is absent. The spleen, pancreas, adrenal glands and kidneys are normal  in appearance. There is a G-tube in place. No abdominal or pelvic  lymph node enlargement.     Pelvis: The ascending colon and transverse colon are very distended  with gas, stool and fluid. The descending and rectosigmoid colon  are  nondilated. Transition point is in the region of the splenic flexure,  but there is no convincing obstruction. Small bowel is normal in  caliber. The appendix is normal. There is no free intraperitoneal gas  or fluid.                                                                      IMPRESSION:  1. The ascending and transverse colon are distended with gas, stool  and fluid. Distal colon is nondilated. No focal obstruction is  evident.  2. Small hiatal hernia.     IMANI HU MD        MR CERVICAL SPINE WITHOUT CONTRAST  1/2/2017  2:44 PM     HISTORY: Injury to neck 2 weeks ago with development of suspected  radicular pain to the left upper extremity with weakness of thumb and  index finger.     COMPARISON: Plain films 12/22/2016.     TECHNIQUE: Routine MR cervical spine extended through T2.     FINDINGS: Vertebral body bone marrow signal is normal and the  alignment is normal through T2. No malignant or destructive changes.  The cervical and upper thoracic spinal cord appear intrinsically  normal through T2. Craniocervical junction region is normal. No  paraspinous soft tissue abnormality.     Findings by level as follows:     C2-C3: Negative. No disc protrusion. No central or lateral stenosis.     C3-C4: Negative.     C4-C5: Very tiny central disc protrusion. No significant central or  lateral stenosis.     C5-C6: Moderate degenerative narrowing of the interspace. Mild  broad-based disc osteophyte complex and small uncinate spurs. Minimal  central stenosis and minimal bilateral foraminal stenosis.     C6-C7: Moderate degenerative narrowing of the interspace. No disc  protrusion. No central or lateral stenosis.     C7-T1: Negative.                                                                      IMPRESSION:  1. Degenerative changes as described, most marked at C5-C6 and C6-C7.  2. No intrinsic cord abnormality through T2.     MARTHA MCCARTY MD           MR LUMBAR SPINE W/O & W CONTRAST 1/6/2019  3:49 PM     Provided History: Back pain, > 6wks conservative tx, persistent sx;  pain in the left paraspinal region- now with fungemia, persistent  blood stream infections since Oct 2018.     Comparison: No similar studies     Technique: Sagittal T1-weighted and T2-weighted and axial T2-weighted  images of the lumbar spine were obtained without intravenous contrast.  Post intravenous contrast using gadolinium axial and sagittal  T1-weighted images were obtained with fat saturation.     Contrast: 8.9CC GADAVIST     Findings: Regarding numbering convention, there are 5 lumbar-type  vertebrae assumed for the purposes of this dictation.  The tip of the  conus medullaris is at L1.  Regarding alignment, the lumbar vertebral  column appears normally aligned.  There is no significant disc height  narrowing at any level.  Regarding bone marrow signal intensity, no  abnormality is visualized on STIR images.     On a level by level basis:     L1-2: No significant spinal canal or foraminal narrowing.     L2-3: Minimal disc bulge. Mild thickening of the ligamentum flavum. No  significant spinal canal or foraminal narrowing.     L3-4: Mild disc bulge and thickening of the ligamentum flavum with  mild spinal canal narrowing. No neural foraminal narrowing.     L4-5: Mild disc bulge and thickening of the ligamentum flavum. No  central spinal canal narrowing or neuroforaminal stenosis.      L5-S1: No significant spinal canal or foraminal narrowing.     3 bandlike areas of high STIR signal and enhancement in the right  posterior paraspinous muscles.                                                                      Impression:  1. No abnormal signal within the spine to suggest fungal infection.  Mild nonspecific enhancement and STIR hyperintensity in the right  posterior paraspinous muscles, potentially myositis.  2. Multilevel lumbar spondylosis.     I have personally reviewed the examination and initial interpretation  and I agree  with the findings.     YOLA TELLEZ MD           Other testing (labs, diagnostics):  5/25/2022  Cr. 0.69  Est GFR >90        Screening tools:      DIRE Score for ongoing opioid management is calculated as follows:     Diagnosis = 2     Intractability = 2     Risk: Psych = 3  Chem Hlth = 2  Reliability = 2  Social = 2     Efficacy = 2     Total DIRE Score = 15 (14 or higher predicts good candidate for ongoing opioid management; 13 or lower predicts poor candidate for opioid management)         [place] Prescription Monitoring Program:  [place]ed [place]  2/6/2023- no concerning fills.  Natalie MENARD, RN CNP, FNP  [facility]  [place] location          Assessment:   1. Visceral hyperalgesia  2. Chronic abdominal pain  3. Chronic pain syndrome  4. Myofascial pain   5. Cervicalgia  6. Chronic bilateral low back pain without sciatica  7. Chronic continuous use of opioids    8. Long term current use of opiate analgesic  9. PMHx includes: ADHD, anxiety, cardiomyopathy and nutritional diseases, chronic abdominal pain, central line associated bloodstream infection recurrent, anesthesia complication, difficulty swallowing, gastric ulcer unspecified as acute or chronic without mention of hemorrhage perforation or obstruction, gastroesophageal reflux disease, head injury, hiatal hernia, other bladder disorder, other chronic pain, postop nausea and vomiting, severe malnutrition on TPN, short gut syndrome, tobacco abuse  10. PSHx includes: Appendectomy, back surgery (11/3/2014), biopsy of cervical lymph node (2/20/2015), cholecystectomy, colonoscopy (2021, 2022), cervical anterior 1 level discectomy and fusion (2/15/2017), endoscopic insertion tube gastrostomy (9/9/2013), endoscopic ultrasound upper gastrointestinal tract (4/29/2011), endoscopic ultrasound upper gastrointestinal tract (9/9/2013), endoscopic ultrasound upper gastrointestinal tract (2/24/2021), endoscopy (3/25/2011, 8/4/2009,  1/5/2009), multiple EGDs, gastrectomy (6/22/2012), gastrojejunostomy (8/26/2009), umbilical hernia repair (2006), hernia raphe hiatal (6/22/2012), herniorrhaphy inguinal (11/22/2013), insert PICC line on the right (12/19/2019), insert PICC line right (2/21/2020), insert vascular access port on the right (12/19/2017, 8/2/2018), multiple interventional radiology CVC tunnel placement and removals, exploratory laparotomy (11/22/2013), orthopedic surgery, Celestino-en-Y gastric bypass (2003), tonsillectomy.        Plan:   1. Physical Therapy: none  2. Clinical Health Psychologist to address issues of relaxation, behavioral change, coping style, and other factors important to improvement: none  3. Continue gentle movement at home  4. Diagnostic Studies: none  5. Medication Management:   1. Continue fentanyl patch 25mcg/hr every 48 hours, fill 3/2 and start 3/4  2. Oxycodone liquid 5mg/5ml,  use 5-10mg (5-10mls) every 4 hours as needed, max of 40mg (40ml) per day. Fill 3/2 and start 3/4  6. Further procedures recommended: none  7. Acupuncture: I am fine with this  8. Recommendations/follow-up for PCP:  See above  9. Release of information: none  10. Follow up: 10-12 weeks for in-person visit. Please call [phone] to make your follow-up appointment with me.       Phone call duration: 22 minutes      BILLING TIME DOCUMENTATION:   TOTAL TIME includes:   Time spent preparing to see the patient: 1 minutes (reviewing records and tests)  Time spend face to face with the patient: 23 minutes  Time spent ordering tests, medications, procedures and referrals: 0 minutes  Time spent Referring and communicating with other healthcare professionals: 0 minutes  Documenting clinical information in Epic: 6 minutes    The total TIME spent on this patient on the day of the appointment was 30 minutes.       Natalie MENARD, RN CNP, FNP  [facility]  [facility] [place]

## 2023-02-06 NOTE — PATIENT INSTRUCTIONS
Plan:   Physical Therapy: none  Clinical Health Psychologist to address issues of relaxation, behavioral change, coping style, and other factors important to improvement: none  Continue gentle movement at home  Diagnostic Studies: none  Medication Management:   Continue fentanyl patch 25mcg/hr every 48 hours, fill 3/2 and start 3/4  Oxycodone liquid 5mg/5ml,  use 5-10mg (5-10mls) every 4 hours as needed, max of 40mg (40ml) per day. Fill 3/2 and start 3/4  Further procedures recommended: none  Acupuncture: I am fine with this  Recommendations/follow-up for PCP:  See above  Release of information: none  Follow up: 10-12 weeks for in-person visit. Please call [phone] to make your follow-up appointment with me.    ----------------------------------------------------------------  Clinic Number:  [phone]   Call with any questions about your care and for scheduling assistance.   Calls are returned Monday through Friday between 8 AM and 4:30 PM. We usually get back to you within 2 business days depending on the issue/request.    If we are prescribing your medications:  For opioid medication refills, call the clinic or send a [employer] message 7 days in advance.  Please include:  Name of requested medication  Name of the pharmacy.  For non-opioid medications, call your pharmacy directly to request a refill. Please allow 3-4 days to be processed.   Per [place] State Law:  All controlled substance prescriptions must be filled within 30 days of being written.    For those controlled substances allowing refills, pickup must occur within 30 days of last fill.      We believe regular attendance is key to your success in our program!    Any time you are unable to keep your appointment we ask that you call us at least 24 hours in advance to cancel.This will allow us to offer the appointment time to another patient.   Multiple missed appointments may lead to dismissal from the clinic.

## 2023-02-06 NOTE — TELEPHONE ENCOUNTER
Prior Authorization Retail Medication Request    Medication/Dose: Lidocaine 5% patch  ICD code (if different than what is on RX):  n/a  Previously Tried and Failed:  N/a  Rationale:  Coverage determination required for verification of a medicare part D medically accepted indication.    Insurance Name:  [facility] Part D  Insurance ID:  [id number]      Pharmacy Information (if different than what is on RX)  Name:  n/a  Phone:  N/a    On behalf of  Pharmacy    Thank You~  Aleksandra Araujo CPhT  [facility]

## 2023-02-07 NOTE — TELEPHONE ENCOUNTER
Appointment scheduled, message sent via [employer].     Message has been reviewed, closing encounter.    Kristin Jaimes XRO/

## 2023-02-09 NOTE — TELEPHONE ENCOUNTER
Central Prior Authorization Team   Phone: [phone]      PA Initiation    Medication: lidocaine (LIDODERM) 5 % patch - PA INITIATED  Insurance Company: [facility] - Phone [phone] Fax [phone]  Pharmacy [place]illing the Rx: [facility] [place] [address] [place] [address]  Filling Pharmacy Phone: [phone]  Filling Pharmacy Fax:    Start Date: 2/9/2023

## 2023-02-10 NOTE — TELEPHONE ENCOUNTER
Prior Authorization Approval    Authorization Effective Date: 1/1/2023  Authorization Expiration Date: 2/9/2024  Medication: lidocaine (LIDODERM) 5 % patch - [place] APPROVED  Insurance Company: [facility] - Phone [phone] Fax [phone]  Which Pharmacy is filling the prescription (Not needed for infusion/clinic administered): [facility] [place] - [address] [place] [address]  Pharmacy Notified: Yes  Patient Notified: Yes (pharmacy will notify patient when ready)

## 2023-02-20 DIAGNOSIS — F90.0 ADHD (ATTENTION DEFICIT HYPERACTIVITY DISORDER), INATTENTIVE TYPE: ICD-10-CM

## 2023-02-20 DIAGNOSIS — I82.90 VTE (VENOUS THROMBOEMBOLISM): ICD-10-CM

## 2023-02-20 DIAGNOSIS — B37.2 YEAST INFECTION OF THE SKIN: ICD-10-CM

## 2023-02-21 ENCOUNTER — LAB REQUISITION (OUTPATIENT)
Dept: LAB | Facility: CLINIC | Age: 53
End: 2023-02-21
Payer: COMMERCIAL

## 2023-02-21 ENCOUNTER — TELEPHONE (OUTPATIENT)
Dept: INTERNAL MEDICINE | Facility: CLINIC | Age: 53
End: 2023-02-21

## 2023-02-21 LAB
ALBUMIN SERPL BCG-MCNC: 3.7 G/DL (ref 3.5–5.2)
ALP SERPL-CCNC: 67 U/L (ref 40–129)
ALT SERPL W P-5'-P-CCNC: 17 U/L (ref 10–50)
ANION GAP SERPL CALCULATED.3IONS-SCNC: 9 MMOL/L (ref 7–15)
AST SERPL W P-5'-P-CCNC: 18 U/L (ref 10–50)
BASOPHILS # BLD AUTO: 0.1 10E3/UL (ref 0–0.2)
BASOPHILS NFR BLD AUTO: 1 %
BILIRUB DIRECT SERPL-MCNC: <0.2 MG/DL (ref 0–0.3)
BILIRUB SERPL-MCNC: 0.3 MG/DL
BUN SERPL-MCNC: 11.6 MG/DL (ref 6–20)
CALCIUM SERPL-MCNC: 8.4 MG/DL (ref 8.6–10)
CHLORIDE SERPL-SCNC: 105 MMOL/L (ref 98–107)
CREAT SERPL-MCNC: 0.66 MG/DL (ref 0.67–1.17)
DEPRECATED HCO3 PLAS-SCNC: 29 MMOL/L (ref 22–29)
EOSINOPHIL # BLD AUTO: 0.2 10E3/UL (ref 0–0.7)
EOSINOPHIL NFR BLD AUTO: 3 %
ERYTHROCYTE [DISTWIDTH] IN BLOOD BY AUTOMATED COUNT: 14 % (ref 10–15)
FASTING STATUS PATIENT QL REPORTED: NORMAL
GFR SERPL CREATININE-BSD FRML MDRD: >90 ML/MIN/1.73M2
GLUCOSE SERPL-MCNC: 109 MG/DL (ref 70–99)
HCT VFR BLD AUTO: 38.8 % (ref 40–53)
HGB BLD-MCNC: 12.5 G/DL (ref 13.3–17.7)
IMM GRANULOCYTES # BLD: 0 10E3/UL
IMM GRANULOCYTES NFR BLD: 0 %
LYMPHOCYTES # BLD AUTO: 2.1 10E3/UL (ref 0.8–5.3)
LYMPHOCYTES NFR BLD AUTO: 33 %
MAGNESIUM SERPL-MCNC: 1.9 MG/DL (ref 1.7–2.3)
MCH RBC QN AUTO: 30.4 PG (ref 26.5–33)
MCHC RBC AUTO-ENTMCNC: 32.2 G/DL (ref 31.5–36.5)
MCV RBC AUTO: 94 FL (ref 78–100)
MONOCYTES # BLD AUTO: 0.7 10E3/UL (ref 0–1.3)
MONOCYTES NFR BLD AUTO: 12 %
NEUTROPHILS # BLD AUTO: 3.1 10E3/UL (ref 1.6–8.3)
NEUTROPHILS NFR BLD AUTO: 51 %
NRBC # BLD AUTO: 0 10E3/UL
NRBC BLD AUTO-RTO: 0 /100
PHOSPHATE SERPL-MCNC: 3.5 MG/DL (ref 2.5–4.5)
PLATELET # BLD AUTO: 214 10E3/UL (ref 150–450)
POTASSIUM SERPL-SCNC: 3.8 MMOL/L (ref 3.4–5.3)
PROT SERPL-MCNC: 6.4 G/DL (ref 6.4–8.3)
RBC # BLD AUTO: 4.11 10E6/UL (ref 4.4–5.9)
SODIUM SERPL-SCNC: 143 MMOL/L (ref 136–145)
TRIGL SERPL-MCNC: 123 MG/DL
WBC # BLD AUTO: 6.3 10E3/UL (ref 4–11)

## 2023-02-21 PROCEDURE — 84100 ASSAY OF PHOSPHORUS: CPT | Performed by: SURGERY

## 2023-02-21 PROCEDURE — 83735 ASSAY OF MAGNESIUM: CPT | Performed by: SURGERY

## 2023-02-21 PROCEDURE — 84478 ASSAY OF TRIGLYCERIDES: CPT | Performed by: SURGERY

## 2023-02-21 PROCEDURE — 85025 COMPLETE CBC W/AUTO DIFF WBC: CPT | Performed by: SURGERY

## 2023-02-21 PROCEDURE — 82248 BILIRUBIN DIRECT: CPT | Performed by: SURGERY

## 2023-02-21 PROCEDURE — 80053 COMPREHEN METABOLIC PANEL: CPT | Performed by: SURGERY

## 2023-02-21 RX ORDER — NYSTATIN 100000 U/G
CREAM TOPICAL 2 TIMES DAILY
Qty: 30 G | Refills: 1 | Status: SHIPPED | OUTPATIENT
Start: 2023-02-21 | End: 2023-11-07

## 2023-02-21 NOTE — TELEPHONE ENCOUNTER
[facility] RN calling needing an order to remove patient's sutures from his forehead after he had a fall. They were placed at [facility] and have been in for 10 days but he has not gone back there to have removed.   Please give orders to have sutures removed by [facility].     Please call CHRISTY Velasquez [phone] [facility] order.      YADIRA JudgeN, RN

## 2023-02-22 RX ORDER — DEXTROAMPHETAMINE SACCHARATE, AMPHETAMINE ASPARTATE, DEXTROAMPHETAMINE SULFATE AND AMPHETAMINE SULFATE 5; 5; 5; 5 MG/1; MG/1; MG/1; MG/1
TABLET ORAL
Qty: 30 TABLET | Refills: 0 | Status: SHIPPED | OUTPATIENT
Start: 2023-02-22 | End: 2023-03-22

## 2023-02-22 RX ORDER — LORAZEPAM 1 MG/1
TABLET ORAL
Qty: 30 TABLET | Refills: 0 | Status: SHIPPED | OUTPATIENT
Start: 2023-02-22 | End: 2023-03-22

## 2023-02-22 RX ORDER — ENOXAPARIN SODIUM 100 MG/ML
INJECTION SUBCUTANEOUS
Qty: 67.2 ML | Refills: 0 | Status: SHIPPED | OUTPATIENT
Start: 2023-02-22 | End: 2023-04-21

## 2023-02-23 ENCOUNTER — PATIENT OUTREACH (OUTPATIENT)
Dept: CARE COORDINATION | Facility: CLINIC | Age: 53
End: 2023-02-23
Payer: COMMERCIAL

## 2023-02-23 ASSESSMENT — ACTIVITIES OF DAILY LIVING (ADL): DEPENDENT_IADLS:: TRANSPORTATION

## 2023-02-23 NOTE — PROGRESS NOTES
Clinic Care Coordination Contact    Follow Up Progress Note      Assessment: RN CC called and spoke to the patient's wife Rose (consent to communicate on file). Patient is due for 30 day outreach, goal check in, and updated complex care plan. Rose reports everything seems to be going well right now. She said they don't really need anything at this time. Patient is working on his care gaps. Rose said the patient is scheduled to see a cardiologist 3/21/2023 with [facility]. She said [facility] was getting too hard to get into so the patient decided to establish with one through [facility]. Rose said she will call if any needs come up.     Care Gaps:    Health Maintenance Due   Topic Date Due     SPIROMETRY  Never done     COPD ACTION PLAN  Never done     HEPATITIS B IMMUNIZATION (1 of 3 - 3-dose series) Never done     MEDICARE ANNUAL WELLNESS VISIT  06/21/2017     COVID-19 Vaccine (3 - Booster for Moderna series) 10/04/2021     ZOSTER IMMUNIZATION (1 of 2) 11/06/2022       Postponed to next PCP visit     Care Plans  Care Plan: Annual Wellness     Problem: Appointment     Goal: I will complete my annual wellness visit.     Start Date: 5/20/2022 Expected End Date: 10/2/2022    This Visit's Progress: 10% Recent Progress: 10%    Note:     Barriers: complex care, and high volume of appointments at baseline  Strengths: wife is pts main caregiver, and organizes his appointments.   Patient expressed understanding of goal: yes  Action steps to achieve this goal:  1. I will schedule my annual wellness visit; [phone] ([phone])  2. I will attend my annual wellness visit.  3. I will contact my Care Management or clinic team if I have barriers to attending my annual wellness visit.                          Intervention/Education provided during outreach: As above. CC role, goal(s), clinic after hours, appointments, discussed/reviewed. Support provided.     Outreach Frequency: monthly    Plan:   1. Patient will  take all medications as prescribed.   2. Patient will continue to work on completing care gaps.   3. Patient will see the cardiologist on 3/21/2023.   4. Complex care plan sent via Conject.   5. Patient/caregiver will call RN CC with questions, concerns, support needs. RN CC will be available as needed.     Care Coordinator will follow up in 1 month.     Kinsey Muhammad RN Care Coordination   [facility]  Email: [email or web address]  Phone: [phone]

## 2023-02-23 NOTE — LETTER
[facility]  Patient Centered Plan of Care  About Me:        Patient Name:  Parker Acevedo    YOB: 1970  Age:         52 year old   [place] MRN:    [id number] Telephone Information:  Home Phone [phone]   Mobile none       Address:  [address] [place] [address] [address] [place] [address] Email address:  [email or web address]      Emergency Contact(s)    Name Relationship Lgl Grd Work Phone Home Phone Mobile Phone   1. BERTRAND RAMOS* Spouse No  [phone]    2. JEYSON CAIN * Relative No  [phone]   3. [place]* Mother No  [phone]           Primary language:  English     needed? No   [facility] [facility]:  [phone] op. 1  Other communication barriers:Glasses; Physical impairment    Preferred Method of Communication:  MyChart  Current living arrangement: I live in a private home with spouse    Mobility Status/ Medical Equipment: Independent        Health Maintenance  Health Maintenance Reviewed: Not assessed      My Access Plan  Medical Emergency 911   Primary Clinic Line [facility] - [phone]   24 Hour Appointment Line [phone] or  [phone] ([phone]) (toll-free)   24 Hour Nurse Line [phone] (toll-free)   Preferred Urgent Care Other     Preferred Hospital [facility]  [phone]     Preferred Pharmacy [facility] [address]     Behavioral Health Crisis Line The National Suicide Prevention Lifeline at 1-358.428.4333 or Text/Call 398             My Care Team Members  Patient Care Team       Relationship Specialty Notifications Start End    Chuy Isaac MD PCP - General Internal Medicine  10/30/18     Phone: [phone] Fax: [phone]         [address] [facility] [address]    Patti Milligan MD (Inactive) MD Pain Clinic  6/2/15     Phone: [phone] Fax: [phone]         [address]  [facility] [address] [facility] [address]    Chuy Isaac MD Assigned PCP   11/11/18     Phone: [phone] Fax: [phone]         [address] [facility] [address]    Marlyn Jenkins MD MD Ophthalmology  1/29/19     Phone: [phone] Fax: [phone]         [address] [facility] [address] [facility] [address]    Marlyn Jenkins MD MD Ophthalmology  2/11/19     Phone: [phone] Fax: [phone]         [address] [facility] [address] [facility] [address]    Francis Vyas MD Assigned Surgical Provider   10/23/20     Phone: [phone] Fax: [phone]         [address] [facility] [address] [facility] [address]    Kinsey Muhammad RN Lead Care Coordinator  Admissions 5/20/22     Natalie Hull APRN CNP Assigned Neuroscience Provider   6/11/22     Phone: [phone] Fax: [phone] [address] [address]    Beatriz Hancock, RN Registered Nurse   7/16/22     Eduardo Butler PA-C Assigned Musculoskeletal Provider   10/1/22     Phone: [phone] Fax: [phone]         [address] [facility] [address]    Sandra Serrano MD Assigned Infectious Disease Provider   12/31/22     Phone: [phone] Fax: [phone]         [address] [facility] [address]    Natalie Hull APRN CNP Assigned Pain Medication Provider   1/9/23     Phone: [phone] Fax: [phone] [address] [address] [address]    Eduardo Maynard MD MD Cardiovascular Disease  1/31/23     Phone: [phone] Fax: [phone] [address] [facility] [address]            My Care Plans  Self Management and Treatment Plan  Care Plan  Care Plan: Annual Wellness     Problem: Appointment     Goal: I will complete my annual wellness visit.     Start Date: 5/20/2022 Expected End Date: 10/2/2022    This Visit's Progress: 10% Recent Progress: 10%    Note:     Barriers: complex care, and high volume of appointments at  baseline  Strengths: wife is pts main caregiver, and organizes his appointments.   Patient expressed understanding of goal: yes  Action steps to achieve this goal:  1. I will schedule my annual wellness visit; [phone] ([phone])  2. I will attend my annual wellness visit.  3. I will contact my Care Management or clinic team if I have barriers to attending my annual wellness visit.                           Action Plans on File:                       Advance Care Plans/Directives Type:   Advanced Directive - On File      My Medical and Care Information  Problem List   Patient Active Problem List   Diagnosis     Peptic ulcer disease     Gastric bypass status for obesity     Chronic abdominal pain     Vomiting     Anemia     ADHD (attention deficit hyperactivity disorder), inattentive type     Vitamin B12 deficiency without anemia     Thiamine deficiency     Dehydration     Dysphagia     Weight loss, non-intentional     Malnutrition (H)     Chronic anxiety     Constipation     Bile reflux esophagitis     Former smoker     Vitamin D deficiency     Iron deficiency     Health Care Home     Chronic pain     Insomnia     Positive blood culture     Fungemia     Chronic nausea     Short gut syndrome     Anxiety     Status post cervical spinal arthrodesis     Port or reservoir infection, initial encounter     Abnormal echocardiogram     Low serum iron     Anemia, iron deficiency     Catheter-related bloodstream infection (CRBSI)     Generalized weakness     S/P bariatric surgery     Hyperlipidemia LDL goal <130     Bacteremia     Infection by Candida species     PICC line infiltration, sequela     Gram-positive bacteremia     On total parenteral nutrition     Gastric outlet obstruction     Short bowel syndrome     Bloodstream infection associated with central venous catheter, initial encounter     Fever, unknown origin     Acute deep vein thrombosis (DVT) of axillary vein of right upper extremity (H)     Bacteremia  "associated with intravascular line, initial encounter (H)     Gram-negative bacteremia      Current Medications and Allergies:    Allergies   Allergen Reactions     Bactrim [Sulfamethoxazole W/Trimethoprim] Rash     Penicillins Anaphylaxis     Please see Antimicrobial Management Team allergy assessment note 10/10/2018. Patient reported tolerating amoxicillin.     Ertapenem Nausea and Vomiting     Doxycycline Rash     Vancomycin Rash     Rash after receiving vancomycin 3/28/16 (infusion reaction?). Tolerated with slower infusion and diphenhydramine premed.     Current Outpatient Medications   Medication     albuterol (PROAIR HFA/PROVENTIL HFA/VENTOLIN HFA) 108 (90 Base) MCG/ACT inhaler     amphetamine-dextroamphetamine (ADDERALL) 20 MG tablet     carvedilol (COREG) 6.25 MG tablet     cyanocobalamin (CYANOCOBALAMIN) 1000 MCG/ML injection     diphenhydrAMINE (BENADRYL) 50 MG capsule     enoxaparin ANTICOAGULANT (LOVENOX) 80 MG/0.8ML syringe     EPINEPHrine (ANY BX GENERIC EQUIV) 0.3 MG/0.3ML injection 2-pack     [facility] INFUSION MANAGED PATIENT     fentaNYL (DURAGESIC) 25 mcg/hr 72 hr patch     fentaNYL (DURAGESIC) 25 mcg/hr 72 hr patch     hydrOXYzine (VISTARIL) 25 MG capsule     insulin syringe-needle U-100 (29G X 1/2\" 1 ML) 29G X 1/2\" 1 ML miscellaneous     lactated ringers infusion     lidocaine (LIDODERM) 5 % patch     LORazepam (ATIVAN) 1 MG tablet     multivitamin, therapeutic (THERA-VIT) TABS tablet     naloxone (NARCAN) 4 MG/0.1ML nasal spray     nystatin (MYCOSTATIN) 480771 UNIT/GM external cream     ondansetron (ZOFRAN ODT) 8 MG ODT tab     oxyCODONE (ROXICODONE) 5 MG/5ML solution     oxyCODONE (ROXICODONE) 5 MG/5ML solution     pantoprazole (PROTONIX) 40 MG EC tablet     parenteral nutrition - PTA/DISCHARGE ORDER     polyethylene glycol (GOLYTELY) 236 g suspension     polyethylene glycol (MIRALAX) 17 GM/Dose powder     polyethylene glycol (MIRALAX) 17 GM/Dose powder     sucralfate (CARAFATE) 1 GM/10ML " suspension     vitamin D2 (ERGOCALCIFEROL) 09375 units (1250 mcg) capsule     No current facility-administered medications for this visit.         Care Coordination Start Date: 5/20/2022   Frequency of Care Coordination: monthly     Form Last Updated: 02/23/2023

## 2023-03-07 ENCOUNTER — TELEPHONE (OUTPATIENT)
Dept: GASTROENTEROLOGY | Facility: CLINIC | Age: 53
End: 2023-03-07
Payer: COMMERCIAL

## 2023-03-07 RX ORDER — BISACODYL 5 MG
TABLET, DELAYED RELEASE (ENTERIC COATED) ORAL
Qty: 4 TABLET | Refills: 0 | Status: SHIPPED | OUTPATIENT
Start: 2023-03-07 | End: 2023-04-05

## 2023-03-07 NOTE — TELEPHONE ENCOUNTER
Pre assessment questions completed for upcoming Colonoscopy  procedure scheduled on 3/20/23 - Spoke with Pt's wife Rose - there is a consent to communicate.    COVID policy reviewed.     Pre-op exam? N/A    Reviewed procedural arrival time 1330, procedure time 1430 and facility location [facility]; [address] [place] [address], [address] Floor, [place] [address]    Designated  policy reviewed. Instructed to have someone stay 24 hours post procedure.     Anticoagulation/blood thinners? Yes Enoxaparin (Lovenox). Holding interval of 24 hours - see note below - Dr. Estrada ok's him to continue    Electronic implanted devices? No    Diabetic? No    Procedure indication: Strep bovus bacteremia    Bowel prep recommendation: Extended prep Golytely d/t poor prep in past    Reviewed procedure prep instructions.     Prep instructions sent via [employer].  Bowel prep script sent to     [facility] [place] - [address]     Patient verbalized understanding and had no questions or concerns at this time.    Jyothi Amato RN  Endoscopy Procedure Pre Assessment RN

## 2023-03-09 ENCOUNTER — HOSPITAL ENCOUNTER (OUTPATIENT)
Dept: ULTRASOUND IMAGING | Facility: CLINIC | Age: 53
Discharge: HOME | End: 2023-03-09
Attending: INTERNAL MEDICINE | Admitting: INTERNAL MEDICINE
Payer: COMMERCIAL

## 2023-03-09 PROCEDURE — 93970 EXTREMITY STUDY: CPT

## 2023-03-14 ENCOUNTER — LAB REQUISITION (OUTPATIENT)
Dept: LAB | Facility: CLINIC | Age: 53
End: 2023-03-14
Payer: COMMERCIAL

## 2023-03-14 DIAGNOSIS — R13.10 DYSPHAGIA, UNSPECIFIED: ICD-10-CM

## 2023-03-14 LAB
ALBUMIN SERPL BCG-MCNC: 3.6 G/DL (ref 3.5–5.2)
ALP SERPL-CCNC: 75 U/L (ref 40–129)
ALT SERPL W P-5'-P-CCNC: 23 U/L (ref 10–50)
ANION GAP SERPL CALCULATED.3IONS-SCNC: 8 MMOL/L (ref 7–15)
AST SERPL W P-5'-P-CCNC: 17 U/L (ref 10–50)
BASOPHILS # BLD AUTO: 0 10E3/UL (ref 0–0.2)
BASOPHILS NFR BLD AUTO: 1 %
BILIRUB DIRECT SERPL-MCNC: <0.2 MG/DL (ref 0–0.3)
BILIRUB SERPL-MCNC: 0.3 MG/DL
BUN SERPL-MCNC: 8.8 MG/DL (ref 6–20)
CALCIUM SERPL-MCNC: 8.5 MG/DL (ref 8.6–10)
CHLORIDE SERPL-SCNC: 104 MMOL/L (ref 98–107)
CREAT SERPL-MCNC: 0.67 MG/DL (ref 0.67–1.17)
DEPRECATED HCO3 PLAS-SCNC: 29 MMOL/L (ref 22–29)
EOSINOPHIL # BLD AUTO: 0.3 10E3/UL (ref 0–0.7)
EOSINOPHIL NFR BLD AUTO: 5 %
ERYTHROCYTE [DISTWIDTH] IN BLOOD BY AUTOMATED COUNT: 14.4 % (ref 10–15)
FASTING STATUS PATIENT QL REPORTED: NORMAL
GFR SERPL CREATININE-BSD FRML MDRD: >90 ML/MIN/1.73M2
GLUCOSE SERPL-MCNC: 81 MG/DL (ref 70–99)
HCT VFR BLD AUTO: 35.3 % (ref 40–53)
HGB BLD-MCNC: 11.3 G/DL (ref 13.3–17.7)
IMM GRANULOCYTES # BLD: 0 10E3/UL
IMM GRANULOCYTES NFR BLD: 0 %
LYMPHOCYTES # BLD AUTO: 1.7 10E3/UL (ref 0.8–5.3)
LYMPHOCYTES NFR BLD AUTO: 32 %
MAGNESIUM SERPL-MCNC: 1.7 MG/DL (ref 1.7–2.3)
MCH RBC QN AUTO: 30.6 PG (ref 26.5–33)
MCHC RBC AUTO-ENTMCNC: 32 G/DL (ref 31.5–36.5)
MCV RBC AUTO: 96 FL (ref 78–100)
MONOCYTES # BLD AUTO: 0.8 10E3/UL (ref 0–1.3)
MONOCYTES NFR BLD AUTO: 15 %
NEUTROPHILS # BLD AUTO: 2.5 10E3/UL (ref 1.6–8.3)
NEUTROPHILS NFR BLD AUTO: 47 %
NRBC # BLD AUTO: 0 10E3/UL
NRBC BLD AUTO-RTO: 0 /100
PHOSPHATE SERPL-MCNC: 3.4 MG/DL (ref 2.5–4.5)
PLATELET # BLD AUTO: 171 10E3/UL (ref 150–450)
POTASSIUM SERPL-SCNC: 3.6 MMOL/L (ref 3.4–5.3)
PROT SERPL-MCNC: 6.1 G/DL (ref 6.4–8.3)
RBC # BLD AUTO: 3.69 10E6/UL (ref 4.4–5.9)
SODIUM SERPL-SCNC: 141 MMOL/L (ref 136–145)
TRIGL SERPL-MCNC: 53 MG/DL
WBC # BLD AUTO: 5.2 10E3/UL (ref 4–11)

## 2023-03-14 PROCEDURE — 85025 COMPLETE CBC W/AUTO DIFF WBC: CPT | Performed by: SURGERY

## 2023-03-14 PROCEDURE — 84100 ASSAY OF PHOSPHORUS: CPT | Performed by: SURGERY

## 2023-03-14 PROCEDURE — 84478 ASSAY OF TRIGLYCERIDES: CPT | Performed by: SURGERY

## 2023-03-14 PROCEDURE — 82248 BILIRUBIN DIRECT: CPT | Performed by: SURGERY

## 2023-03-14 PROCEDURE — 80053 COMPREHEN METABOLIC PANEL: CPT | Performed by: SURGERY

## 2023-03-14 PROCEDURE — 83735 ASSAY OF MAGNESIUM: CPT | Performed by: SURGERY

## 2023-03-20 ENCOUNTER — HOSPITAL ENCOUNTER (OUTPATIENT)
Facility: AMBULATORY SURGERY CENTER | Age: 53
End: 2023-03-20
Attending: INTERNAL MEDICINE | Admitting: INTERNAL MEDICINE
Payer: COMMERCIAL

## 2023-03-20 ENCOUNTER — TELEPHONE (OUTPATIENT)
Dept: GASTROENTEROLOGY | Facility: CLINIC | Age: 53
End: 2023-03-20
Payer: COMMERCIAL

## 2023-03-20 DIAGNOSIS — G89.4 CHRONIC PAIN SYNDROME: ICD-10-CM

## 2023-03-20 DIAGNOSIS — G89.29 CHRONIC ABDOMINAL PAIN: ICD-10-CM

## 2023-03-20 DIAGNOSIS — F90.0 ADHD (ATTENTION DEFICIT HYPERACTIVITY DISORDER), INATTENTIVE TYPE: ICD-10-CM

## 2023-03-20 DIAGNOSIS — F11.90 CHRONIC, CONTINUOUS USE OF OPIOIDS: ICD-10-CM

## 2023-03-20 DIAGNOSIS — M79.18 MYOFASCIAL PAIN: ICD-10-CM

## 2023-03-20 DIAGNOSIS — M54.50 CHRONIC BILATERAL LOW BACK PAIN WITHOUT SCIATICA: ICD-10-CM

## 2023-03-20 DIAGNOSIS — M54.2 CERVICALGIA: ICD-10-CM

## 2023-03-20 DIAGNOSIS — G89.29 CHRONIC BILATERAL LOW BACK PAIN WITHOUT SCIATICA: ICD-10-CM

## 2023-03-20 DIAGNOSIS — R10.9 CHRONIC ABDOMINAL PAIN: ICD-10-CM

## 2023-03-20 DIAGNOSIS — R19.8 VISCERAL HYPERALGESIA: ICD-10-CM

## 2023-03-20 RX ORDER — FENTANYL 25 UG/1
1 PATCH TRANSDERMAL
Qty: 15 PATCH | Refills: 0 | Status: SHIPPED | OUTPATIENT
Start: 2023-03-20 | End: 2023-04-24

## 2023-03-20 RX ORDER — LIDOCAINE 40 MG/G
CREAM TOPICAL
Status: CANCELLED | OUTPATIENT
Start: 2023-03-20

## 2023-03-20 RX ORDER — ONDANSETRON 2 MG/ML
4 INJECTION INTRAMUSCULAR; INTRAVENOUS
Status: CANCELLED | OUTPATIENT
Start: 2023-03-20

## 2023-03-20 RX ORDER — OXYCODONE HCL 5 MG/5 ML
5-10 SOLUTION, ORAL ORAL EVERY 6 HOURS PRN
Qty: 1200 ML | Refills: 0 | Status: SHIPPED | OUTPATIENT
Start: 2023-03-20 | End: 2023-04-24

## 2023-03-20 NOTE — TELEPHONE ENCOUNTER
Caller: Rose Herrera    Reason for Reschedule/Cancellation (please be detailed, any staff messages or encounters to note?): Caller stated that patient was not able to be flushed out even after completing extended prep.      Prior to reschedule please review:    Ordering Provider:RYAN BONILLA    Sedation per order:MAC    Does patient have any ASC Exclusions, please identify?: NO      Notes on Cancelled Procedure:    Procedure:Lower Endoscopy [Colonoscopy]     Date: 3/20    [facility]; [address] [place] [address], [address] [place] [address]    Surgeon: CHAD        Rescheduled: Yes    Procedure: Lower Endoscopy [Colonoscopy]     Date: 6/12    Location:[facility] [place]; [address] [place] [address], [address] [place] [address]    Surgeon: CHAD    Sedation Level Scheduled  MAC,  Reason for Sedation Level PER ORDER    Prep/Instructions updated and sent:  NATI

## 2023-03-20 NOTE — TELEPHONE ENCOUNTER
Patient requesting refill(s) of fentaNYL (DURAGESIC) 25 mcg/hr 72 hr patch  Last dispensed from pharmacy on 3/2/23    oxyCODONE (ROXICODONE) 5 MG/5ML solution  Last dispensed from pharmacy on 3/2/23    Patient's last office/virtual visit by prescribing provider on 2/6/23  Next office/virtual appointment scheduled for 05/08/23    Last urine drug screen date 11/09/22  Current opioid agreement on file (completed within the last year) Yes Date of opioid agreement: 11/11/22    E-prescribe to [facility] [place]     Will route to nursing [place] for review and preparation of prescription(s).

## 2023-03-20 NOTE — TELEPHONE ENCOUNTER
Medication refill information reviewed.     Due date for  fentaNYL (DURAGESIC) 25 mcg/hr 72 hr patch and oxyCODONE (ROXICODONE) 5 MG/5ML solution is 04/03/23     Prescriptions prepped for review.     Will route to provider.

## 2023-03-21 NOTE — TELEPHONE ENCOUNTER
Signed Prescriptions:                        Disp   Refills    fentaNYL (DURAGESIC) 25 mcg/hr 72 hr patch 15 pat*0        Sig: Place 1 patch onto the skin every 48 hours remove old           patch. Fill 04/01/23 and start 04/03/23. 30 days           supply  Authorizing Provider: NATALIE MCDOWELL    oxyCODONE (ROXICODONE) 5 MG/5ML solution   1200 mL0        Sig: Take 5-10 mLs (5-10 mg) by mouth every 6 hours as           needed for severe pain (7-10) Take 4 times daily           as needed for pain. Max of 40mg per day           (40ml/day) at least 4 hours between dosing. Fill           04/01/23 and start 04/03/23. 30 days supply  Authorizing Provider: NATALIE MCDOWELL        Reviewed [place]  March 20, 2023- no concerning fills.    Natalie MENARD, RN CNP, FNP  [facility]  [facility]

## 2023-03-22 RX ORDER — DEXTROAMPHETAMINE SACCHARATE, AMPHETAMINE ASPARTATE, DEXTROAMPHETAMINE SULFATE AND AMPHETAMINE SULFATE 5; 5; 5; 5 MG/1; MG/1; MG/1; MG/1
TABLET ORAL
Qty: 30 TABLET | Refills: 0 | Status: SHIPPED | OUTPATIENT
Start: 2023-03-22 | End: 2023-04-21

## 2023-03-22 RX ORDER — LORAZEPAM 1 MG/1
TABLET ORAL
Qty: 30 TABLET | Refills: 0 | Status: SHIPPED | OUTPATIENT
Start: 2023-03-22 | End: 2023-04-21

## 2023-03-27 ENCOUNTER — PATIENT OUTREACH (OUTPATIENT)
Dept: CARE COORDINATION | Facility: CLINIC | Age: 53
End: 2023-03-27
Payer: COMMERCIAL

## 2023-03-27 DIAGNOSIS — E55.9 VITAMIN D DEFICIENCY: ICD-10-CM

## 2023-03-27 DIAGNOSIS — E53.8 VITAMIN B12 DEFICIENCY (NON ANEMIC): Primary | ICD-10-CM

## 2023-03-27 NOTE — TELEPHONE ENCOUNTER
No current refills here  Sharon Fair, Pharmacy [facility] [place] [phone]

## 2023-03-27 NOTE — PROGRESS NOTES
Clinic Care Coordination Contact  [facility]/Voicemail       Clinical Data: Care Coordinator Outreach  Outreach attempted x 1.  Left message on patient's voicemail with call back information and requested return call.  Plan: Care Coordinator sent care coordination introduction letter on 5/20/2022 via [email or web address]. Care Coordinator will try to reach patient again in 10 business days.    Kinsey Muhammad RN Care Coordination   [facility] Dove  Email: [email or web address]  Phone: [phone]

## 2023-03-27 NOTE — LETTER
[facility] CARE COORDINATION  [address] [facility] [address]  Phone: [phone]      April 10, 2023      Parker Acevedo  [address] [facility] [address]    Dear Parker,    We have been trying to reach you to introduce you to [facility] s Care Coordination program.  The goal of care coordination is to help you manage your health and improve access to the [facility] system in the most efficient manner.  The Care Coordinator is a nurse who understands the healthcare system and will assist you in improving your access to care.     As your Physician and Care Coordinator we partner to help you achieve your health care goals.     We will continue to reach out; however, if you are able to call your Care Coordinator at [phone], that would be appreciated.  We at [facility] are focused on providing you with the highest-quality healthcare experience possible.      It is a pleasure to partner with you as we work towards achieving your optimal state of wellness.        Sincerely,    CHRISTY Abdul Paul D None   [address] [facility] [address]

## 2023-03-28 RX ORDER — NEEDLES, SAFETY 18GX1 1/2"
1 NEEDLE, DISPOSABLE MISCELLANEOUS
Qty: 30 EACH | Refills: 1 | Status: SHIPPED | OUTPATIENT
Start: 2023-03-28 | End: 2024-02-06

## 2023-03-28 RX ORDER — NEEDLES, SAFETY 22GX1 1/2"
NEEDLE, DISPOSABLE MISCELLANEOUS
OUTPATIENT
Start: 2023-03-28

## 2023-03-29 NOTE — TELEPHONE ENCOUNTER
Routing refill request to provider for review/approval because:    Requested Prescriptions   Pending Prescriptions Disp Refills    vitamin D2 (ERGOCALCIFEROL) 32274 units (1250 mcg) capsule 12 capsule 3     Sig: Take 1 capsule (50,000 Units) by mouth once a week       There is no refill protocol information for this order

## 2023-03-30 RX ORDER — ERGOCALCIFEROL 1.25 MG/1
50000 CAPSULE, LIQUID FILLED ORAL WEEKLY
Qty: 12 CAPSULE | Refills: 3 | Status: SHIPPED | OUTPATIENT
Start: 2023-03-30 | End: 2024-01-05

## 2023-04-03 ENCOUNTER — TELEPHONE (OUTPATIENT)
Dept: INTERNAL MEDICINE | Facility: CLINIC | Age: 53
End: 2023-04-03
Payer: COMMERCIAL

## 2023-04-03 NOTE — TELEPHONE ENCOUNTER
Reason for Call:  Form, our goal is to have forms completed with 72 hours, however, some forms may require a visit or additional information.    Type of letter, form or note:  Home Health Certification    Who is the form from?: Home care    Where did the form come from: form was faxed in    What clinic location was the form placed at?: [facility]    Where the form was placed:  MA/RN folder - [facility]      What number is listed as a contact on the form?: [phone]       Additional comments: Gemma     Call taken on 4/3/2023 at 2:03 PM by Kristin Jaimes

## 2023-04-10 NOTE — PROGRESS NOTES
Clinic Care Coordination Contact  [facility]/Voicemail       Clinical Data: Care Coordinator Outreach  Outreach attempted x 2.  Left message on patient's voicemail with call back information and requested return call.  Plan: Care Coordinator will send unable to contact letter with care coordinator contact information via [employer]. Care Coordinator will try to reach patient again in 10 business days.    Kinsey Muhammad RN Care Coordination   [facility]  Email: [email or web address]  Phone: [phone]

## 2023-04-11 ENCOUNTER — LAB REQUISITION (OUTPATIENT)
Dept: LAB | Facility: CLINIC | Age: 53
End: 2023-04-11
Payer: COMMERCIAL

## 2023-04-11 DIAGNOSIS — R13.10 DYSPHAGIA, UNSPECIFIED: ICD-10-CM

## 2023-04-11 DIAGNOSIS — Z53.9 DIAGNOSIS NOT YET DEFINED: Primary | ICD-10-CM

## 2023-04-11 LAB
ALBUMIN SERPL BCG-MCNC: 3.9 G/DL (ref 3.5–5.2)
ALP SERPL-CCNC: 72 U/L (ref 40–129)
ALT SERPL W P-5'-P-CCNC: 25 U/L (ref 10–50)
ANION GAP SERPL CALCULATED.3IONS-SCNC: 8 MMOL/L (ref 7–15)
AST SERPL W P-5'-P-CCNC: 22 U/L (ref 10–50)
BASOPHILS # BLD AUTO: 0 10E3/UL (ref 0–0.2)
BASOPHILS NFR BLD AUTO: 1 %
BILIRUB DIRECT SERPL-MCNC: <0.2 MG/DL (ref 0–0.3)
BILIRUB SERPL-MCNC: 0.2 MG/DL
BUN SERPL-MCNC: 17 MG/DL (ref 6–20)
CALCIUM SERPL-MCNC: 8.5 MG/DL (ref 8.6–10)
CHLORIDE SERPL-SCNC: 104 MMOL/L (ref 98–107)
CREAT SERPL-MCNC: 0.77 MG/DL (ref 0.67–1.17)
DEPRECATED HCO3 PLAS-SCNC: 29 MMOL/L (ref 22–29)
EOSINOPHIL # BLD AUTO: 0.3 10E3/UL (ref 0–0.7)
EOSINOPHIL NFR BLD AUTO: 5 %
ERYTHROCYTE [DISTWIDTH] IN BLOOD BY AUTOMATED COUNT: 15.1 % (ref 10–15)
GFR SERPL CREATININE-BSD FRML MDRD: >90 ML/MIN/1.73M2
GLUCOSE SERPL-MCNC: 95 MG/DL (ref 70–99)
HCT VFR BLD AUTO: 35.3 % (ref 40–53)
HGB BLD-MCNC: 11.1 G/DL (ref 13.3–17.7)
IMM GRANULOCYTES # BLD: 0 10E3/UL
IMM GRANULOCYTES NFR BLD: 0 %
LYMPHOCYTES # BLD AUTO: 2.1 10E3/UL (ref 0.8–5.3)
LYMPHOCYTES NFR BLD AUTO: 32 %
MAGNESIUM SERPL-MCNC: 2 MG/DL (ref 1.7–2.3)
MCH RBC QN AUTO: 30.1 PG (ref 26.5–33)
MCHC RBC AUTO-ENTMCNC: 31.4 G/DL (ref 31.5–36.5)
MCV RBC AUTO: 96 FL (ref 78–100)
MONOCYTES # BLD AUTO: 0.9 10E3/UL (ref 0–1.3)
MONOCYTES NFR BLD AUTO: 14 %
NEUTROPHILS # BLD AUTO: 3.1 10E3/UL (ref 1.6–8.3)
NEUTROPHILS NFR BLD AUTO: 48 %
NRBC # BLD AUTO: 0 10E3/UL
NRBC BLD AUTO-RTO: 0 /100
PHOSPHATE SERPL-MCNC: 4.1 MG/DL (ref 2.5–4.5)
PLATELET # BLD AUTO: 199 10E3/UL (ref 150–450)
POTASSIUM SERPL-SCNC: 3.8 MMOL/L (ref 3.4–5.3)
PROT SERPL-MCNC: 6.7 G/DL (ref 6.4–8.3)
RBC # BLD AUTO: 3.69 10E6/UL (ref 4.4–5.9)
SODIUM SERPL-SCNC: 141 MMOL/L (ref 136–145)
TRIGL SERPL-MCNC: 64 MG/DL
WBC # BLD AUTO: 6.5 10E3/UL (ref 4–11)

## 2023-04-11 PROCEDURE — 85025 COMPLETE CBC W/AUTO DIFF WBC: CPT | Performed by: SURGERY

## 2023-04-11 PROCEDURE — 84100 ASSAY OF PHOSPHORUS: CPT | Performed by: SURGERY

## 2023-04-11 PROCEDURE — 82248 BILIRUBIN DIRECT: CPT | Performed by: SURGERY

## 2023-04-11 PROCEDURE — G0179 MD RECERTIFICATION HHA PT: HCPCS | Performed by: INTERNAL MEDICINE

## 2023-04-11 PROCEDURE — 83735 ASSAY OF MAGNESIUM: CPT | Performed by: SURGERY

## 2023-04-11 PROCEDURE — 84478 ASSAY OF TRIGLYCERIDES: CPT | Performed by: SURGERY

## 2023-04-11 PROCEDURE — 80053 COMPREHEN METABOLIC PANEL: CPT | Performed by: SURGERY

## 2023-04-14 NOTE — PROGRESS NOTES
This is a recent snapshot of the patient's [facility] medical record.  For current drug dose and complete information and questions, call [phone] or In Basket pool, fv home infusion ([id number])  CSN Number:  [id number]

## 2023-04-16 NOTE — PROGRESS NOTES
This is a recent snapshot of the patient's [facility] medical record.  For current drug dose and complete information and questions, call [phone] or In Basket pool, fv home infusion ([id number])  CSN Number:  [id number]       Headache

## 2023-04-20 DIAGNOSIS — R93.1 ABNORMAL ECHOCARDIOGRAM: ICD-10-CM

## 2023-04-20 DIAGNOSIS — F90.0 ADHD (ATTENTION DEFICIT HYPERACTIVITY DISORDER), INATTENTIVE TYPE: ICD-10-CM

## 2023-04-20 DIAGNOSIS — I82.90 VTE (VENOUS THROMBOEMBOLISM): ICD-10-CM

## 2023-04-20 DIAGNOSIS — Z98.84 GASTRIC BYPASS STATUS FOR OBESITY: ICD-10-CM

## 2023-04-20 DIAGNOSIS — R11.0 NAUSEA: ICD-10-CM

## 2023-04-20 NOTE — TELEPHONE ENCOUNTER
"Requested Prescriptions   Pending Prescriptions Disp Refills    ADDERALL 20 MG tablet [Pharmacy Med Name: ADDERALL 20MG TABS] 30 tablet 0     Sig: TAKE ONE TABLET BY MOUTH ONCE DAILY       There is no refill protocol information for this order       ondansetron (ZOFRAN ODT) 8 MG ODT tab [Pharmacy Med Name: ONDANSETRON 8MG TBDP] 90 tablet 1     Sig: DISSOLVE ONE TABLET ON TONGUE EVERY 8 HOURS AS NEEDED FOR NAUSEA        Antivertigo/Antiemetic Agents Passed - 4/20/2023 12:02 PM        Passed - Recent (12 mo) or future (30 days) visit within the authorizing provider's specialty     Patient has had an office visit with the authorizing provider or a provider within the authorizing providers department within the previous 12 mos or has a future within next 30 days. See \"Patient Info\" tab in inbasket, or \"Choose Columns\" in Meds & Orders section of the refill encounter.              Passed - Medication is active on med list        Passed - Patient is 18 years of age or older          sucralfate (CARAFATE) 1 GM/10ML suspension [Pharmacy Med Name: SUCRALFATE 1GM/10ML SUSP] 420 mL 1     Sig: TAKE 10MLS  BY MOUTH FOUR TIMES A DAY AS NEEDED       Miscellaneous Gastrointestinal Agents Passed - 4/20/2023 12:02 PM        Passed - Recent (12 mo) or future (30 days) visit within the authorizing provider's specialty     Patient has had an office visit with the authorizing provider or a provider within the authorizing providers department within the previous 12 mos or has a future within next 30 days. See \"Patient Info\" tab in inEasycausesket, or \"Choose Columns\" in Meds & Orders section of the refill encounter.              Passed - Medication is active on med list        Passed - Patient is 18 years of age or older          enoxaparin ANTICOAGULANT (LOVENOX) 80 MG/0.8ML syringe [Pharmacy Med Name: ENOXAPARIN SODIUM 80MG/0.8ML SOSY] 67.2 mL 0     Sig: INJECT THE CONTENTS OF ONE SYRINGE (80MG) UNDER THE SKIN TWO TIMES A DAY       There is no " "refill protocol information for this order       LORazepam (ATIVAN) 1 MG tablet [Pharmacy Med Name: LORAZEPAM 1MG TABS] 30 tablet 0     Sig: TAKE ONE TABLET BY MOUTH ONCE DAILY AS NEEDED FOR ANXIETY WITH TPN AND MEDICATIONS       There is no refill protocol information for this order       carvedilol (COREG) 6.25 MG tablet [Pharmacy Med Name: CARVEDILOL 6.25MG TABS] 60 tablet 3     Sig: Take 2 tablets (12.5 mg) by mouth 2 times daily (with meals)       Beta-Blockers Protocol Passed - 4/20/2023 12:02 PM        Passed - Blood pressure under 140/90 in past 12 months     BP Readings from Last 3 Encounters:   01/21/23 119/87   01/13/23 128/76   11/09/22 107/73                 Passed - Patient is age 6 or older        Passed - Recent (12 mo) or future (30 days) visit within the authorizing provider's specialty     Patient has had an office visit with the authorizing provider or a provider within the authorizing providers department within the previous 12 mos or has a future within next 30 days. See \"Patient Info\" tab in inbasket, or \"Choose Columns\" in Meds & Orders section of the refill encounter.              Passed - Medication is active on med list              Aparna Sr RN  "

## 2023-04-21 ENCOUNTER — TELEPHONE (OUTPATIENT)
Dept: INTERNAL MEDICINE | Facility: CLINIC | Age: 53
End: 2023-04-21
Payer: COMMERCIAL

## 2023-04-21 DIAGNOSIS — M79.18 MYOFASCIAL PAIN: ICD-10-CM

## 2023-04-21 DIAGNOSIS — F11.90 CHRONIC, CONTINUOUS USE OF OPIOIDS: ICD-10-CM

## 2023-04-21 DIAGNOSIS — G89.4 CHRONIC PAIN SYNDROME: ICD-10-CM

## 2023-04-21 DIAGNOSIS — G89.29 CHRONIC ABDOMINAL PAIN: ICD-10-CM

## 2023-04-21 DIAGNOSIS — G89.29 CHRONIC BILATERAL LOW BACK PAIN WITHOUT SCIATICA: ICD-10-CM

## 2023-04-21 DIAGNOSIS — M54.50 CHRONIC BILATERAL LOW BACK PAIN WITHOUT SCIATICA: ICD-10-CM

## 2023-04-21 DIAGNOSIS — M54.2 CERVICALGIA: ICD-10-CM

## 2023-04-21 DIAGNOSIS — R10.9 CHRONIC ABDOMINAL PAIN: ICD-10-CM

## 2023-04-21 DIAGNOSIS — R19.8 VISCERAL HYPERALGESIA: ICD-10-CM

## 2023-04-21 RX ORDER — DEXTROAMPHETAMINE SACCHARATE, AMPHETAMINE ASPARTATE, DEXTROAMPHETAMINE SULFATE AND AMPHETAMINE SULFATE 5; 5; 5; 5 MG/1; MG/1; MG/1; MG/1
TABLET ORAL
Qty: 30 TABLET | Refills: 0 | Status: SHIPPED | OUTPATIENT
Start: 2023-04-21 | End: 2023-05-24

## 2023-04-21 RX ORDER — OXYCODONE HCL 5 MG/5 ML
5-10 SOLUTION, ORAL ORAL EVERY 6 HOURS PRN
Qty: 1200 ML | Refills: 0 | Status: CANCELLED | OUTPATIENT
Start: 2023-04-21

## 2023-04-21 RX ORDER — ONDANSETRON 8 MG/1
TABLET, ORALLY DISINTEGRATING ORAL
Qty: 90 TABLET | Refills: 1 | Status: SHIPPED | OUTPATIENT
Start: 2023-04-21 | End: 2023-06-22

## 2023-04-21 RX ORDER — SUCRALFATE ORAL 1 G/10ML
SUSPENSION ORAL
Qty: 420 ML | Refills: 1 | Status: SHIPPED | OUTPATIENT
Start: 2023-04-21 | End: 2023-06-30

## 2023-04-21 RX ORDER — LORAZEPAM 1 MG/1
TABLET ORAL
Qty: 30 TABLET | Refills: 0 | Status: SHIPPED | OUTPATIENT
Start: 2023-04-21 | End: 2023-05-24

## 2023-04-21 RX ORDER — FENTANYL 25 UG/1
1 PATCH TRANSDERMAL
Qty: 15 PATCH | Refills: 0 | Status: CANCELLED | OUTPATIENT
Start: 2023-04-21

## 2023-04-21 RX ORDER — CARVEDILOL 6.25 MG/1
12.5 TABLET ORAL 2 TIMES DAILY WITH MEALS
Qty: 60 TABLET | Refills: 3 | Status: SHIPPED | OUTPATIENT
Start: 2023-04-21 | End: 2023-06-22

## 2023-04-21 RX ORDER — ENOXAPARIN SODIUM 100 MG/ML
INJECTION SUBCUTANEOUS
Qty: 67.2 ML | Refills: 0 | Status: SHIPPED | OUTPATIENT
Start: 2023-04-21 | End: 2023-05-24

## 2023-04-21 NOTE — TELEPHONE ENCOUNTER
This was in my box, please review and prep as appropriate.     Thanks  Natalie MENARD, RN CNP, FNP  [facility]      
This will be processed in a new encounter.    YADIRA LazarN, RN  Care Coordinator  [facility] [place]    
Yes

## 2023-04-21 NOTE — TELEPHONE ENCOUNTER
Prior Authorization Retail Medication Request    Medication/Dose: Enoxaparin 80mg/0.8ml Syringe  ICD code (if different than what is on RX):    Previously Tried and Failed:    Rationale:  PA needs renewal    Insurance Name:  [facility] Part D  Insurance ID:  [id number]      Thank You  Anisha Cope [facility]  [phone]

## 2023-04-21 NOTE — TELEPHONE ENCOUNTER
Please process a refill of fentaNYL (DURAGESIC) 25 mcg/hr 72 hr patch and oxyCODONE (ROXICODONE) 5 MG/5ML solution  to     [facility] [place], [place] - [address] [place] [address]  Phone: [phone] Fax: [phone]

## 2023-04-21 NOTE — TELEPHONE ENCOUNTER
Received call from patient requesting refill(s) oxyCODONE (ROXICODONE) 5 MG/5ML solution    Last dispensed from pharmacy on 3-31-23    Patient's last office/virtual visit by prescribing provider on 2/6/23  Next office/virtual appointment scheduled for     Last urine drug screen date 5/8/23  Current opioid agreement on file (completed within the last year) Yes Date of opioid agreement: 11/9/23    E-pres[place] to  [facility] [place] [address], pharmacy    Will route to nursing [place] for review and preparation of prescription(s).      Rika Dooley MA  [facility]

## 2023-04-24 ENCOUNTER — TELEPHONE (OUTPATIENT)
Dept: PALLIATIVE MEDICINE | Facility: CLINIC | Age: 53
End: 2023-04-24
Payer: COMMERCIAL

## 2023-04-24 RX ORDER — FENTANYL 25 UG/1
1 PATCH TRANSDERMAL
Qty: 15 PATCH | Refills: 0 | Status: SHIPPED | OUTPATIENT
Start: 2023-04-24 | End: 2023-05-23

## 2023-04-24 RX ORDER — OXYCODONE HCL 5 MG/5 ML
5-10 SOLUTION, ORAL ORAL EVERY 6 HOURS PRN
Qty: 1200 ML | Refills: 0 | Status: SHIPPED | OUTPATIENT
Start: 2023-04-24 | End: 2023-06-02

## 2023-04-24 RX ORDER — FENTANYL 25 UG/1
PATCH TRANSDERMAL
Qty: 15 PATCH | Refills: 0 | OUTPATIENT
Start: 2023-04-24

## 2023-04-24 NOTE — PROGRESS NOTES
This is a recent snapshot of the patient's [facility] medical record.  For current drug dose and complete information and questions, call [phone] or In Basket pool, fv home infusion ([id number])  CSN Number:  [id number]

## 2023-04-24 NOTE — TELEPHONE ENCOUNTER
PA needed for: Oxycodone Solution 5mg/5ml (PA renewal)  Insurance: [employer] Part d  Insur phone: [phone]  Patient ID: [id number]  Please let us know when PA is granted/denied. Thank you!  Sharon Fair, Pharmacy Tech, [facility] [place] [phone]

## 2023-04-26 ENCOUNTER — PATIENT OUTREACH (OUTPATIENT)
Dept: CARE COORDINATION | Facility: CLINIC | Age: 53
End: 2023-04-26
Payer: COMMERCIAL

## 2023-04-26 NOTE — TELEPHONE ENCOUNTER
Prior Authorization Approval    Authorization Effective Date: 1/26/2023  Authorization Expiration Date: 4/26/2024  Medication: enoxaparin ANTICOAGULANT (LOVENOX) 80 MG/0.8ML syringe - Approved  Approved Dose/Quantity:   Reference #: [id number]   Insurance Company: [employer] - Phone [phone] Fax [phone]  Which Pharmacy is filling the prescription (Not needed for infusion/clinic administered): [facility] [place] - [address] [place] [address]  Pharmacy Notified: Yes  Patient Notified: Yes

## 2023-04-26 NOTE — TELEPHONE ENCOUNTER
PA Initiation    Medication: enoxaparin ANTICOAGULANT (LOVENOX) 80 MG/0.8ML syringe - Initiated  Insurance Company: [employer] - Phone [phone] Fax [phone]  Pharmacy Filling the Rx: [facility] - [place], [place] - [address] [place] [address]  Filling Pharmacy Phone: [phone]  Filling Pharmacy Fax: [phone]  Start Date: 4/26/2023

## 2023-04-26 NOTE — LETTER
[facility] CARE COORDINATION  [address] [address]  Phone: [phone]      May 12, 2023      Parker Acevedo  [address] [facility] [address]    Dear Parker,    We have been trying to reach you to introduce you to [facility] s Care Coordination program.  The goal of care coordination is to help you manage your health and improve access to the [facility] system in the most efficient manner.  The Care Coordinator is a nurse who understands the healthcare system and will assist you in improving your access to care.     As your Physician and Care Coordinator we partner to help you achieve your health care goals.     We will continue to reach out; however, if you are able to call your Care Coordinator at [phone], that would be appreciated.  We at [facility] are focused on providing you with the highest-quality healthcare experience possible.      It is a pleasure to partner with you as we work towards achieving your optimal state of wellness.        Sincerely,    CHRISTY Abdul Paul D None   [address] [address]

## 2023-04-26 NOTE — PROGRESS NOTES
Clinic Care Coordination Contact  [facility]/Voicemail       Clinical Data: Care Coordinator Outreach  Outreach attempted x 1.  Left message on patient's voicemail with call back information and requested return call.  Plan: Care Coordinator sent care coordination introduction letter on 5/20/2022 via [employer]. Care Coordinator will try to reach patient again in 10 business days.    Kinsey Muhammad RN Care Coordination   [facility] Yeyo Dove  Email: [email or web address]  Phone: [phone]

## 2023-04-27 NOTE — TELEPHONE ENCOUNTER
Central Prior Authorization Team   Phone: [phone]      PA Initiation    Medication: oxyCODONE (ROXICODONE) 5 MG/5ML solution - PA INITIATED  Insurance Company: [employer] [place] - Phone [phone] Fax [phone]  Pharmacy Filling the Rx: [facility] [place], [place] - [address] [place] [address]  Filling Pharmacy Phone: [phone]  Filling Pharmacy Fax:    Start Date: 4/27/2023

## 2023-04-28 NOTE — TELEPHONE ENCOUNTER
Prior Authorization Approval    Authorization Effective Date: 1/27/2023  Authorization Expiration Date: 4/27/2024  Medication: oxyCODONE (ROXICODONE) 5 MG/5ML solution - PA APPROVED   Insurance Company: [facility] - Phone [phone] Fax [phone]  Which Pharmacy is filling the prescription (Not needed for infusion/clinic administered): [facility] [place] - [address] [place] [address]  Pharmacy Notified: Yes  Patient Notified: Yes (pharmacy will notify patient when ready)

## 2023-05-09 ENCOUNTER — TELEPHONE (OUTPATIENT)
Dept: INTERNAL MEDICINE | Facility: CLINIC | Age: 53
End: 2023-05-09

## 2023-05-09 ENCOUNTER — LAB REQUISITION (OUTPATIENT)
Dept: LAB | Facility: CLINIC | Age: 53
End: 2023-05-09
Payer: COMMERCIAL

## 2023-05-09 DIAGNOSIS — Z78.9 ON TOTAL PARENTERAL NUTRITION: Primary | ICD-10-CM

## 2023-05-09 DIAGNOSIS — R13.10 DYSPHAGIA, UNSPECIFIED: ICD-10-CM

## 2023-05-09 LAB
ALBUMIN SERPL BCG-MCNC: 3.9 G/DL (ref 3.5–5.2)
ALP SERPL-CCNC: 90 U/L (ref 40–129)
ALT SERPL W P-5'-P-CCNC: 31 U/L (ref 10–50)
ANION GAP SERPL CALCULATED.3IONS-SCNC: 10 MMOL/L (ref 7–15)
AST SERPL W P-5'-P-CCNC: 17 U/L (ref 10–50)
BASOPHILS # BLD AUTO: 0 10E3/UL (ref 0–0.2)
BASOPHILS NFR BLD AUTO: 0 %
BILIRUB DIRECT SERPL-MCNC: <0.2 MG/DL (ref 0–0.3)
BILIRUB SERPL-MCNC: 0.3 MG/DL
BUN SERPL-MCNC: 18 MG/DL (ref 6–20)
CALCIUM SERPL-MCNC: 9.1 MG/DL (ref 8.6–10)
CHLORIDE SERPL-SCNC: 105 MMOL/L (ref 98–107)
CREAT SERPL-MCNC: 0.64 MG/DL (ref 0.67–1.17)
CRP SERPL-MCNC: <3 MG/L
DEPRECATED HCO3 PLAS-SCNC: 28 MMOL/L (ref 22–29)
EOSINOPHIL # BLD AUTO: 0.2 10E3/UL (ref 0–0.7)
EOSINOPHIL NFR BLD AUTO: 2 %
ERYTHROCYTE [DISTWIDTH] IN BLOOD BY AUTOMATED COUNT: 16 % (ref 10–15)
FERRITIN SERPL-MCNC: 35 NG/ML (ref 31–409)
GFR SERPL CREATININE-BSD FRML MDRD: >90 ML/MIN/1.73M2
GLUCOSE SERPL-MCNC: 99 MG/DL (ref 70–99)
HCT VFR BLD AUTO: 39.2 % (ref 40–53)
HGB BLD-MCNC: 12.4 G/DL (ref 13.3–17.7)
IMM GRANULOCYTES # BLD: 0 10E3/UL
IMM GRANULOCYTES NFR BLD: 0 %
LYMPHOCYTES # BLD AUTO: 1.7 10E3/UL (ref 0.8–5.3)
LYMPHOCYTES NFR BLD AUTO: 23 %
MAGNESIUM SERPL-MCNC: 2 MG/DL (ref 1.7–2.3)
MCH RBC QN AUTO: 30.6 PG (ref 26.5–33)
MCHC RBC AUTO-ENTMCNC: 31.6 G/DL (ref 31.5–36.5)
MCV RBC AUTO: 97 FL (ref 78–100)
MONOCYTES # BLD AUTO: 0.8 10E3/UL (ref 0–1.3)
MONOCYTES NFR BLD AUTO: 11 %
NEUTROPHILS # BLD AUTO: 4.8 10E3/UL (ref 1.6–8.3)
NEUTROPHILS NFR BLD AUTO: 64 %
NRBC # BLD AUTO: 0 10E3/UL
NRBC BLD AUTO-RTO: 0 /100
PHOSPHATE SERPL-MCNC: 3.8 MG/DL (ref 2.5–4.5)
PLATELET # BLD AUTO: 188 10E3/UL (ref 150–450)
POTASSIUM SERPL-SCNC: 4.1 MMOL/L (ref 3.4–5.3)
PROT SERPL-MCNC: 6.7 G/DL (ref 6.4–8.3)
RBC # BLD AUTO: 4.05 10E6/UL (ref 4.4–5.9)
SODIUM SERPL-SCNC: 143 MMOL/L (ref 136–145)
TRIGL SERPL-MCNC: 50 MG/DL
WBC # BLD AUTO: 7.6 10E3/UL (ref 4–11)

## 2023-05-09 PROCEDURE — 84630 ASSAY OF ZINC: CPT | Performed by: SURGERY

## 2023-05-09 PROCEDURE — 83735 ASSAY OF MAGNESIUM: CPT | Performed by: SURGERY

## 2023-05-09 PROCEDURE — 82248 BILIRUBIN DIRECT: CPT | Performed by: SURGERY

## 2023-05-09 PROCEDURE — 84478 ASSAY OF TRIGLYCERIDES: CPT | Performed by: SURGERY

## 2023-05-09 PROCEDURE — 80053 COMPREHEN METABOLIC PANEL: CPT | Performed by: SURGERY

## 2023-05-09 PROCEDURE — 84100 ASSAY OF PHOSPHORUS: CPT | Performed by: SURGERY

## 2023-05-09 PROCEDURE — 83785 ASSAY OF MANGANESE: CPT | Performed by: SURGERY

## 2023-05-09 PROCEDURE — 82728 ASSAY OF FERRITIN: CPT | Performed by: SURGERY

## 2023-05-09 PROCEDURE — 85025 COMPLETE CBC W/AUTO DIFF WBC: CPT | Performed by: SURGERY

## 2023-05-09 PROCEDURE — 84255 ASSAY OF SELENIUM: CPT | Performed by: SURGERY

## 2023-05-09 PROCEDURE — 86140 C-REACTIVE PROTEIN: CPT | Performed by: SURGERY

## 2023-05-09 NOTE — TELEPHONE ENCOUNTER
Please triage him.  If he is having fevers up to 104 I think he needs to go into the emergency room.  He has a peripheral line, PICC line and has frequently had bacteremia.

## 2023-05-09 NOTE — TELEPHONE ENCOUNTER
"Home care RN called [facility] following visit today.     Parker has been having fevers on and off the \"past few days\".  Unlike previous episodes of infection, the fevers are resolving on their own after about an hour.      Tmax = 104F    Patient was afebrile at visit today.     I attempted to call pt/spouse x2 to follow up.  No answer.      Please let me know if any further orders at this time    Wai FlanaganD [facility]   Ph: [phone]  Fax: [phone]    "

## 2023-05-09 NOTE — TELEPHONE ENCOUNTER
Attempt #1    RN did attempt to reach patient. No answer. Message left for patient to call the clinic back and ask to speak to a triage nurse. Wanting to triage fevers per Dr. Isaac message below. May need to go to ED.

## 2023-05-10 NOTE — TELEPHONE ENCOUNTER
RN TRIAGE CALL:    Patient Contact    Attempt # 2    Was call answered?  No.  Unable to leave message.    Ella Hammond RN

## 2023-05-11 ENCOUNTER — TELEPHONE (OUTPATIENT)
Dept: INTERNAL MEDICINE | Facility: CLINIC | Age: 53
End: 2023-05-11
Payer: COMMERCIAL

## 2023-05-11 DIAGNOSIS — Z78.9 ON TOTAL PARENTERAL NUTRITION: Primary | ICD-10-CM

## 2023-05-11 NOTE — TELEPHONE ENCOUNTER
I spoke to his nurse.  She will discuss with Parker today when drawing the BCx today and I will send an extension to be placed.    Maybe could an IR referral be placed so he could make an appointment to have the clamp replaced?    Thank you!  -Yamilet

## 2023-05-11 NOTE — TELEPHONE ENCOUNTER
RN Triage    Patient Contact    Attempt # 3    Was call answered?  No.  Left message on voicemail with information to call me back.    Genesis Ardon RN on 5/11/2023 at 9:35 AM

## 2023-05-11 NOTE — TELEPHONE ENCOUNTER
Esther    [facility] RD spoke with pt's spouse, Rose, yesterday 5/10/23.  Rose again said Parker continues to have fevers. RD informed spouse that clinic was trying to call to assess and told Rose to call the clinic back.    Have you heard from them?    -Yamilet

## 2023-05-11 NOTE — TELEPHONE ENCOUNTER
Wife notified of providers advice.  She will have home care nurse draw the sample.    Genesis Ardon RN on 5/11/2023 at 10:59 AM

## 2023-05-11 NOTE — TELEPHONE ENCOUNTER
Hello,  I can see orders placed for blood cultures.    I will communicate to the home nurse and send supplies needed.      I am concerned to read though that Rose withdrew blood from the catheter.  The clamp on one of his lumens broke off a while ago.  We do not have replacements for these clamps to supply at home.  Patient was offered extension with a clamp, he declined.  He also was given the option to have a new clamp put on at the hospital, he declined.  His IV access is non-valved, so there is risk for blood backing up.  If blood is drawn through the connector cap our policy is the connector needs to be replaced (by an RN).      Please advise.    Thank you!    Jyothi Peraza, PharmD [facility]   Ph: [phone]  Fax: [phone]

## 2023-05-11 NOTE — TELEPHONE ENCOUNTER
Wife states he has been going up an down in temperature.    He is fine right now, but he has been up at 104.    Wife states they usually do blood cultures at home before sending him in to the ER.    He is feeling good today.  Eating and drinking fine.  No signs of infection around his picc line.    Wife states he had blood in his line and she was able to pull it out and re flush the line and it flushed well.    She is wondering if he can get a blood culture?    Genesis Ardon RN on 5/11/2023 at 9:59 AM

## 2023-05-12 ENCOUNTER — LAB REQUISITION (OUTPATIENT)
Dept: LAB | Facility: CLINIC | Age: 53
End: 2023-05-12
Payer: COMMERCIAL

## 2023-05-12 DIAGNOSIS — R13.10 DYSPHAGIA, UNSPECIFIED: ICD-10-CM

## 2023-05-12 LAB
MANGANESE BLD-MCNC: 11.7 UG/L
SELENIUM SERPL-MCNC: 126 UG/L

## 2023-05-12 PROCEDURE — 87040 BLOOD CULTURE FOR BACTERIA: CPT | Performed by: INTERNAL MEDICINE

## 2023-05-12 NOTE — PROGRESS NOTES
Clinic Care Coordination Contact  [facility]/Voicemail       Clinical Data: Care Coordinator Outreach  Outreach attempted x 2.  Left message on patient's voicemail with call back information and requested return call.  Plan: Care Coordinator will send unable to contact letter with care coordinator contact information via [employer]. Care Coordinator will try to reach patient again in 10 business days.    Kinsey Muhammad RN Care Coordination   [facility] JarrattYeyo Goldman  Email: [email or web address]  Phone: [phone]

## 2023-05-16 ENCOUNTER — MYC MEDICAL ADVICE (OUTPATIENT)
Dept: INTERNAL MEDICINE | Facility: CLINIC | Age: 53
End: 2023-05-16
Payer: COMMERCIAL

## 2023-05-16 ENCOUNTER — TELEPHONE (OUTPATIENT)
Dept: INTERNAL MEDICINE | Facility: CLINIC | Age: 53
End: 2023-05-16
Payer: COMMERCIAL

## 2023-05-16 DIAGNOSIS — Z78.9 ON TOTAL PARENTERAL NUTRITION: Primary | ICD-10-CM

## 2023-05-16 DIAGNOSIS — Z98.84 GASTRIC BYPASS STATUS FOR OBESITY: ICD-10-CM

## 2023-05-16 NOTE — TELEPHONE ENCOUNTER
RN attempted to reach patient in telephone encounter. No answer. Message left for patient to call clinic back. Will also send provider response back patient in Meet You message.

## 2023-05-16 NOTE — TELEPHONE ENCOUNTER
RN Triage    Patient Contact    Attempt # 1    RN did attempt to reach patient. No answer. Message left for patient to call the clinic back and ask to speak to a triage nurse. Wanting to review below message from provider. Mychart message sent to patient with same message from provider as patient had sent in mychart regarding issue as well.     Garrick Liu RN on 5/16/2023 at 1:19 PM

## 2023-05-16 NOTE — TELEPHONE ENCOUNTER
Per telephone encounter from today:     Chuy Isaac MD PL    5/16/23 12:42 PM  Note  Patient should go to [facility] ER for evaluation for fever and to have Interventional radiology thomas his PICC line.                   Jyothi Peraza, PharmD     SHILPA    5/16/23 12:13 PM  Note  Hello,     Patient's spouse, Rose, called [facility] to follow up on blood cultures results.      Still no growth to date.      Patient with ongoing fevers daily, up to 102F.     Per spouse, RN was unable to get a blood return from the lumen without a clamp on it for blood culture sample.  Only collected from the red lumen and peripherally.      Patient does not have coverage for TPA to be administered in the home.      Discussed with spouse that risk of clot on an unclamped line with no blood return was high. I encouraged her to take him to be evaluated.  Also requested she call clinic to discuss.     Thank you,     Jyothi Peraza, PharmD [facility]   Ph: [phone]  Fax: [phone]

## 2023-05-16 NOTE — TELEPHONE ENCOUNTER
Hello,    Patient's spouse, Rose, called [facility] ER to follow up on blood cultures results.     Still no growth to date.     Patient with ongoing fevers daily, up to 102F.    Per spouse, RN was unable to get a blood return from the lumen without a clamp on it for blood culture sample.  Only collected from the red lumen and peripherally.     Patient does not have coverage for TPA to be administered in the home.     Discussed with spouse that risk of clot on an unclamped line with no blood return was high. I encouraged her to take him to be evaluated.  Also requested she call clinic to discuss.    Thank you,    Jyothi Peraza, PharmD [facility]   Ph: [phone]  Fax: [phone]

## 2023-05-16 NOTE — TELEPHONE ENCOUNTER
Patient should go to [facility] ER for evaluation for fever and to have Interventional radiology thomas his PICC line.

## 2023-05-17 ENCOUNTER — TELEPHONE (OUTPATIENT)
Dept: INTERNAL MEDICINE | Facility: CLINIC | Age: 53
End: 2023-05-17

## 2023-05-17 LAB
BACTERIA BLD CULT: NO GROWTH
BACTERIA BLD CULT: NO GROWTH

## 2023-05-17 NOTE — TELEPHONE ENCOUNTER
Message from My Chart by RN below read by patient on 5/16/2023 at 10:30 pm.    RN TRIAGE CALL:    Patient Contact    Attempt # 2    Was call answered?  No.  Left message on voicemail with information that we had left a My Chart message and had noted documentation was read as per date and time above.    Left message to call any triage RN back for any further questions or concerns.    Will close this encounter at this time.    Ella Hammond RN

## 2023-05-17 NOTE — TELEPHONE ENCOUNTER
Order/Referral Request: NEW ORDER NEEDED FOR PINZON     Who is requesting: wife     Orders being requested: needing correction on IV line, patient does not have a PICC he has a PINZON     Reason service is needed/diagnosis:     When are orders needed by: as soon as possible    Has this been discussed with Provider: Yes    Does patient have a preference on a Group/Provider/Facility? [facility], Interventional Radiology, [place]    Does patient have an appointment scheduled?:     Where to send orders: Place orders within Epic    Could we send this information to you in [facility] or would you prefer to receive a phone call?:   Spouse would prefer a phone call   Okay to leave a detailed message?: Yes at Home number on file [phone] (home)

## 2023-05-18 ENCOUNTER — TELEPHONE (OUTPATIENT)
Dept: INTERNAL MEDICINE | Facility: CLINIC | Age: 53
End: 2023-05-18

## 2023-05-18 DIAGNOSIS — Z78.9 ON TOTAL PARENTERAL NUTRITION: Primary | ICD-10-CM

## 2023-05-18 DIAGNOSIS — Z98.84 GASTRIC BYPASS STATUS FOR OBESITY: ICD-10-CM

## 2023-05-18 NOTE — TELEPHONE ENCOUNTER
FYI - Status Update    Who is Calling: nurseNicki RN, [facility]    Update: They have made several attempts and calls to patient with no response. Unable to complete lab draws and dressing changes to [facility], and unable to complete 60 day visit for re-certification that is due by Saturday, 5/20/23.    If they do not here back or unable to contact patient by 5/24 they will be discharging patient from there services.     Does caller want a call/response back: No, not unless there are further questions, then Nicki can be reached at [phone]

## 2023-05-18 NOTE — CONSULTS
Outpatient IR Referral  05/18/23    Referral request: Left IJ double lumen tunneled line check, exchange possible new placement    Left IJ 6 French 27 cm double lumen tunneled PowerLine has had a broken clamp for awhile and recent difficulty with aspirating blood. Recent concern for infection prompted blood cultures from 5/12 which have no growth.  Well known to [facility] IR with several line placements, revisions and removals. Uses line for TPN. Has always had double lumen.    Recommend sedated IR CVC TUNNEL CHECK LEFT for overwire exchange for new catheter that is able to get blood return. As always, in this patient, we risk not being able to maintain access and obtain new access via neck. At some point, will likely require transhepatic or translumbar line for TPN. 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

## 2023-05-19 ENCOUNTER — LAB REQUISITION (OUTPATIENT)
Dept: LAB | Facility: CLINIC | Age: 53
End: 2023-05-19
Payer: COMMERCIAL

## 2023-05-19 DIAGNOSIS — R13.10 DYSPHAGIA, UNSPECIFIED: ICD-10-CM

## 2023-05-19 PROCEDURE — 82525 ASSAY OF COPPER: CPT | Performed by: SURGERY

## 2023-05-19 PROCEDURE — 84630 ASSAY OF ZINC: CPT | Performed by: SURGERY

## 2023-05-21 LAB
COPPER SERPL-MCNC: 122.5 UG/DL
ZINC SERPL-MCNC: 93.4 UG/DL

## 2023-05-22 NOTE — PROGRESS NOTES
[facility]    5/23/2023    Chief complaint:   Ongoing abdominal pain        Interval history:  Parker Acevedo is a 52 year old male is known to me for:  Visceral hyperalgesia  Chronic abdominal pain  Chronic pain syndrome  PMHx includes: ADHD, anxiety, cardiomyopathy and nutritional diseases, chronic abdominal pain, central line associated bloodstream infection recurrent, anesthesia complication, difficulty swallowing, gastric ulcer unspecified as acute or chronic without mention of hemorrhage perforation or obstruction, gastroesophageal reflux disease, head injury, hiatal hernia, other bladder disorder, other chronic pain, postop nausea and vomiting, severe malnutrition on TPN, short gut syndrome, tobacco abuse  PSHx includes: Appendectomy, back surgery (11/3/2014), biopsy of cervical lymph node (2/20/2015), cholecystectomy, colonoscopy (2021, 2022), cervical anterior 1 level discectomy and fusion (2/15/2017), endoscopic insertion tube gastrostomy (9/9/2013), endoscopic ultrasound upper gastrointestinal tract (4/29/2011), endoscopic ultrasound upper gastrointestinal tract (9/9/2013), endoscopic ultrasound upper gastrointestinal tract (2/24/2021), endoscopy (3/25/2011, 8/4/2009, 1/5/2009), multiple EGDs, gastrectomy (6/22/2012), gastrojejunostomy (8/26/2009), umbilical hernia repair (2006), hernia raphe hiatal (6/22/2012), herniorrhaphy inguinal (11/22/2013), insert PICC line on the right (12/19/2019), insert PICC line right (2/21/2020), insert vascular access port on the right (12/19/2017, 8/2/2018), multiple interventional radiology CVC tunnel placement and removals, exploratory laparotomy (11/22/2013), orthopedic surgery, Celestino-en-Y gastric bypass (2003), tonsillectomy.        Recommendations/plan at the last visit on 2/6/2023 included:  1. Physical Therapy: none  2. Clinical Health Psychologist to address issues of relaxation, behavioral change, coping style, and other factors  important to improvement: none  3. Continue gentle movement at home  4. Diagnostic Studies: none  5. Medication Management:   1. Continue fentanyl patch 25mcg/hr every 48 hours, fill 3/2 and start 3/4  2. Oxycodone liquid 5mg/5ml,  use 5-10mg (5-10mls) every 4 hours as needed, max of 40mg (40ml) per day. Fill 3/2 and start 3/4  6. Further procedures recommended: none  7. Acupuncture: I am fine with this  8. Recommendations/follow-up for PCP:  See above  9. Release of information: none  10. Follow up: 10-12 weeks for in-person visit. Please call [phone] to make your follow-up appointment with me.         Since his last visit, Parker Acevedo reports:    Interval history May 23, 2023  -he has ongoing abdominal pain. He will now feel very ill about 3-4 times per month. When he feels like this, he often needs to go to the ER and get his medications via IV.   -he was not able to complete his colonoscopy as he was not as cleaned out enough.   -discussed possible Lyrica or Cymbalta. He prefers to keep his medications the same.      Interval history February 6, 2023  -struck his head on a cupboard reaching for a glass in the kitchen and cracked his head, had to get stiches above his eye in the forehead.   -he states he didn't have a work up for a concussion.   -he has had more headaches since striking his head.   -encouraged him to see his Primary Care Provider   -he has had nausea prior to the head injury.   -he has had nausea and vomiting that comes and goes. It is not worse now after the head injury.   -he has a lot of swelling around the laceration per patient report, I encouraged him to be seen in urgent care for evaluation.       Interval history November 9, 2022  -Parker has ongoing abdominal pain  -he relates that the recheck for Hep B was negative  -he is stable on his current regimen of fentanyl patch and oxycodone liquid    Interval history August 23, 2022  -abdominal pain, pretty bad today, ID will recheck  "him for Hep B and are doing some updated imaging.   -insurance adjusters at his home now for floyd issues  -left knee pain after slip and fall, seen in ER on 8/9/2022 and in immobilizer, will follow up with ortho.     Pain history collected at initial visit on 5/27/2022  He had gastric bypass in 2003 and about 5 years after that, he developed gastric ulcers. He ended up having surgery for gastric ulcer and hernias and such. He has had over 11 surgeries for his abdomen. He is malnourished due to short-gut syndrome. He has a J-tube that is open to vent bile from his stomach as he gets bloated.   Worst pain is inside the abdominal cavity. He will have pain so bad that he states he screams for his mother. He feels that this is a deep inside pain.   -he would like to increase his oxycodone dose by one dose per day. His activity is limited by not having medication to cover him for his daily activities.      -he has a DVT in his right arm and in his right jugular vein. He is on lovenox.         At this point, the patient's participation with our multidisciplinary team includes:  The patient has been compliant with the program.  PT - none  Health Psych - none      Pain scores:  Pain intensity on average is 3-5 on a scale of 0-10.    Range is 3-10+/10. He will get to a 10+ if he misses his medication  Pain right now is 4/10.   Pain is described as \"sharp, burning, agonizing and like on fire or like pirahnas gnawing me inside out,  Can bee a really deep, hard ache.\"    Pain is constant in nature    Current pain relevant medications:   -tylenol 32mg/ml liquid take 15.65mls (500mg) Q 4 hours PRN fever/mild pain  (somewhat helpful for headaches)  --Duragesic 25mcg/hr Q 48 hours (helpful)  -lidoderm 5% patch PRN (helpful)  -Narcan nasal spray for opiate reversal   -Oxycodone 5mg/5ml solution take 5-10mls (5-10mg) TID PRN max of 40mg/day with 4 hours between doses.      Other pertinent medications:  -Adderall 20mg every " day  -LOVENOX 120mg Subcutaneously every day  -lorazepam 1mg for anxiety with TPN and meds once per day (uses most nights)  -Zofran 8mg Q 8 hours PRN     Previous medication treatments included:  OPIATES: Fentanyl (helpful), morphine (hallucinations), Dilaudid (not helpful, did not dissolve), Tylenol #3 (not helpful), hydrocodone (not helpful), Belbuca (not helpful--he had a really heavy chest feeling)  NSAIDS: cannot take due to severe gastric ulcer disease  MUSCLE RELAXANTS: none  ANTI-MIGRAINE MEDS: none  ANTI-DEPRESSANTS: none  SLEEP AIDS: benadryl (helpful)  ANTI-CONVULSANTS: gabapentin (bad headaches and acid reflux),  TOPICALS: Lidocaine patches (helpful)  ANXIOLYTICS: valium (helpful 5mg dosage), lorazepam (helpful)  MEDICAL CANNABIS: not interested  Other meds: tylenol (helpful for headaches)        Other treatments have included:  Parker Acevedo has been seen at a pain clinic in the past.  Previously managed here by Dr. Jessica Milligan. Also seen by [facility] for possible implanted intrathecal pain pump, he is not candidate due to infection risk.   PT: tried, never helped his neck or abdominal surgery  Massage Therapy: helpful for a day or two  Chiropractic care: tried, helped some   Acupuncture: tried, not helpful  TENs Unit: tried, not helpful  Healing Touch: not helpful     Injections:   -previously had injections for his neck with Dr. Jessica Milligan        THE 4 A's OF OPIOID MAINTENANCE ANALGESIA    Analgesia: keeps pain pretty manageable    Activity: he walks daily, light housekeeping    Adverse effects: none    Adherence to Rx protocol: yes      Side Effects: no side effect  Patient is using the medication as prescribed: YES    Medications:  Current Outpatient Medications   Medication Sig Dispense Refill     albuterol (PROAIR HFA/PROVENTIL HFA/VENTOLIN HFA) 108 (90 Base) MCG/ACT inhaler Inhale 2 puffs into the lungs every 6 hours as needed 18 g 1     amphetamine-dextroamphetamine (ADDERALL) 20 MG tablet  "TAKE ONE TABLET BY MOUTH ONCE DAILY 30 tablet 0     carvedilol (COREG) 6.25 MG tablet TAKE 2 TABLETS (12.5 MG) BY MOUTH 2 TIMES DAILY (WITH MEALS) 60 tablet 3     cyanocobalamin (CYANOCOBALAMIN) 1000 MCG/ML injection INJECT 1 ML INTO THE MUSCLE EVERY 30 DAYS 1 mL 3     diphenhydrAMINE (BENADRYL) 50 MG capsule Take 50 mg by mouth 2 times daily       enoxaparin ANTICOAGULANT (LOVENOX) 80 MG/0.8ML syringe INJECT THE CONTENTS OF ONE SYRINGE (80MG) UNDER THE SKIN TWO TIMES A DAY 67.2 mL 0     EPINEPHrine (ANY BX GENERIC EQUIV) 0.3 MG/0.3ML injection 2-pack        [place] [facility] MANAGED PATIENT Contact [facility] for patient specific medication information at [phone] on admission and discharge from the hospital.  Phones are answered 24 hours a day 7 days a week 365 days a year.    Providers - Choose \"CONTINUE HOME MED (no script)\" at discharge if patient treatment with home infusion will continue.       fentaNYL (DURAGESIC) 25 mcg/hr 72 hr patch Place 1 patch onto the skin every 48 hours remove old patch. Fill 05/01/23 and start 05/03/23. 30 days supply 15 patch 0     insulin syringe-needle U-100 (29G X 1/2\" 1 ML) 29G X 1/2\" 1 ML miscellaneous Use to inject b12 every 30 days 3 each 4     lactated ringers infusion Inject 1,000-2,000 mLs into the vein daily as needed       lidocaine (LIDODERM) 5 % patch Place 1-2 patches onto the skin every 24 hours Wear for 12 hours, remove for 12 hours.  OK to cut to better fit to size. 60 patch 6     LORazepam (ATIVAN) 1 MG tablet TAKE ONE TABLET BY MOUTH ONCE DAILY AS NEEDED FOR ANXIETY WITH TPN AND MEDICATIONS 30 tablet 0     multivitamin, therapeutic (THERA-VIT) TABS tablet Take 1 tablet by mouth daily       naloxone (NARCAN) 4 MG/0.1ML nasal spray Spray 1 spray (4 mg) into one nostril alternating nostrils once as needed for opioid reversal every 2-3 minutes until assistance arrives 0.2 mL 0     nystatin (MYCOSTATIN) 662229 UNIT/GM external cream APPLY " "TOPICALLY 2 TIMES DAILY 30 g 1     ondansetron (ZOFRAN ODT) 8 MG ODT tab DISSOLVE ONE TABLET ON TONGUE EVERY 8 HOURS AS NEEDED FOR NAUSEA 90 tablet 1     oxyCODONE (ROXICODONE) 5 MG/5ML solution Take 5-10 mLs (5-10 mg) by mouth every 6 hours as needed for severe pain Take 4 times daily as needed for pain. Max of 40mg per day (40ml/day) at least 4 hours between dosing. Fill 05/01/23 and start 05/03/23. 30 days supply 1200 mL 0     pantoprazole (PROTONIX) 40 MG EC tablet Take 1 tablet (40 mg) by mouth daily 30 tablet 5     parenteral nutrition - PTA/DISCHARGE ORDER Patient receives 2050 mL TPN every 14 hours through PICC at [facility].       polyethylene glycol (MIRALAX) 17 GM/Dose powder Take 17 g by mouth daily 1020 g 3     sucralfate (CARAFATE) 1 GM/10ML suspension TAKE 10MLS  BY MOUTH FOUR TIMES A DAY AS NEEDED 420 mL 1     Syringe/Needle, Disp, (B-D ECLIPSE SYRINGE) 27G X 1/2\" 1 ML MISC 1 Device every 30 days 30 each 1     vitamin D2 (ERGOCALCIFEROL) 07434 units (1250 mcg) capsule Take 1 capsule (50,000 Units) by mouth once a week 12 capsule 3       Medical History: any changes in medical history since they were last seen? No    Social History:   Home situation:  to [place], one son in late 20's   Occupation/Schooling: he used to work at [employer]. He is on disability, SSDI  Tobacco use: smokes 1/2 ppd, discussed smoking cessation today, he is not ready at present time  Alcohol use: none  Drug use: none  History of chemical dependency treatment: none    Is patient a current smoker or tobacco user?  Down to 3-4 cigarettes per day  If yes, was cessation counseling offered?  Yes          Physical Exam:  Vital signs: Blood pressure (!) 156/101, pulse 73.    Behavioral observations:  Awake, alert, conversant and cooperative    Gait:  normal    Musculoskeletal exam:  Strength grossly equal throughout    Neuro exam:  deferred    Skin/vascular/autonomic:  No suspicious lesions on exposed skin. " Many scars present on abdoment. Has feeding tube    Other:  na    Is the patient hypertensive today? yes  Hypertensive on recheck of BP?   yes  If yes, was patient recommended to see Primary Care Provider in follow up for management of HTN?  yes               Diagnostic tests:  CT ABDOMEN PELVIS W CONTRAST  2/16/2019 3:00 AM      HISTORY: Right-sided abdominal pain, status post gastric bypass.  Patient has G-tube with blood and history of ulcers.     TECHNIQUE: CT abdomen and pelvis with 85 mL Isovue-370 IV. Radiation  dose for this scan was reduced using automated exposure control,  adjustment of the mA and/or kV according to patient size, or iterative  reconstruction technique.     COMPARISON: 1/13/2015.     FINDINGS:  Abdomen: There is mild dependent atelectasis at the lung bases. The  heart size is normal. Small hiatal hernia. There is mild biliary  dilatation into the pancreas head which is probably normal post  cholecystectomy. The liver otherwise appears normal. The gallbladder  is absent. The spleen, pancreas, adrenal glands and kidneys are normal  in appearance. There is a G-tube in place. No abdominal or pelvic  lymph node enlargement.     Pelvis: The ascending colon and transverse colon are very distended  with gas, stool and fluid. The descending and rectosigmoid colon are  nondilated. Transition point is in the region of the splenic flexure,  but there is no convincing obstruction. Small bowel is normal in  caliber. The appendix is normal. There is no free intraperitoneal gas  or fluid.                                                                      IMPRESSION:  1. The ascending and transverse colon are distended with gas, stool  and fluid. Distal colon is nondilated. No focal obstruction is  evident.  2. Small hiatal hernia.     IMANI HU MD        MR CERVICAL SPINE WITHOUT CONTRAST  1/2/2017  2:44 PM     HISTORY: Injury to neck 2 weeks ago with development of suspected  radicular pain to  the left upper extremity with weakness of thumb and  index finger.     COMPARISON: Plain films 12/22/2016.     TECHNIQUE: Routine MR cervical spine extended through T2.     FINDINGS: Vertebral body bone marrow signal is normal and the  alignment is normal through T2. No malignant or destructive changes.  The cervical and upper thoracic spinal cord appear intrinsically  normal through T2. Craniocervical junction region is normal. No  paraspinous soft tissue abnormality.     Findings by level as follows:     C2-C3: Negative. No disc protrusion. No central or lateral stenosis.     C3-C4: Negative.     C4-C5: Very tiny central disc protrusion. No significant central or  lateral stenosis.     C5-C6: Moderate degenerative narrowing of the interspace. Mild  broad-based disc osteophyte complex and small uncinate spurs. Minimal  central stenosis and minimal bilateral foraminal stenosis.     C6-C7: Moderate degenerative narrowing of the interspace. No disc  protrusion. No central or lateral stenosis.     C7-T1: Negative.                                                                      IMPRESSION:  1. Degenerative changes as described, most marked at C5-C6 and C6-C7.  2. No intrinsic cord abnormality through T2.     MARTHA MCCARTY MD           MR LUMBAR SPINE W/O & W CONTRAST 1/6/2019 3:49 PM     Provided History: Back pain, > 6wks conservative tx, persistent sx;  pain in the left paraspinal region- now with fungemia, persistent  blood stream infections since Oct 2018.     Comparison: No similar studies     Technique: Sagittal T1-weighted and T2-weighted and axial T2-weighted  images of the lumbar spine were obtained without intravenous contrast.  Post intravenous contrast using gadolinium axial and sagittal  T1-weighted images were obtained with fat saturation.     Contrast: 8.9CC GADAVIST     Findings: Regarding numbering convention, there are 5 lumbar-type  vertebrae assumed for the purposes of this dictation.  The  tip of the  conus medullaris is at L1.  Regarding alignment, the lumbar vertebral  column appears normally aligned.  There is no significant disc height  narrowing at any level.  Regarding bone marrow signal intensity, no  abnormality is visualized on STIR images.     On a level by level basis:     L1-2: No significant spinal canal or foraminal narrowing.     L2-3: Minimal disc bulge. Mild thickening of the ligamentum flavum. No  significant spinal canal or foraminal narrowing.     L3-4: Mild disc bulge and thickening of the ligamentum flavum with  mild spinal canal narrowing. No neural foraminal narrowing.     L4-5: Mild disc bulge and thickening of the ligamentum flavum. No  central spinal canal narrowing or neuroforaminal stenosis.      L5-S1: No significant spinal canal or foraminal narrowing.     3 bandlike areas of high STIR signal and enhancement in the right  posterior paraspinous muscles.                                                                      Impression:  1. No abnormal signal within the spine to suggest fungal infection.  Mild nonspecific enhancement and STIR hyperintensity in the right  posterior paraspinous muscles, potentially myositis.  2. Multilevel lumbar spondylosis.     I have personally reviewed the examination and initial interpretation  and I agree with the findings.     YOLA TELLEZ MD           Other testing (labs, diagnostics):  5/25/2022  Cr. 0.69  Est GFR >90        Screening tools:      DIRE Score for ongoing opioid management is calculated as follows:     Diagnosis = 2     Intractability = 2     Risk: Psych = 3  Chem Hlth = 2  Reliability = 2  Social = 2     Efficacy = 2     Total DIRE Score = 15 (14 or higher predicts good candidate for ongoing opioid management; 13 or lower predicts poor candidate for opioid management)         [place] Prescription Monitoring [place]:  [place]ed [place]  5/23/2023- no concerning fills.  Natalie MENARD, RN CNP, FNP  [facility]  [facility] [place]          Assessment:   1. Visceral hyperalgesia  2. Chronic abdominal pain  3. Chronic pain syndrome  4. Myofascial pain   5. Cervicalgia  6. Chronic bilateral low back pain without sciatica  7. Chronic continuous use of opioids  8. CSA 11/11/2022  9. UDT 11/9/2022    10. Long term current use of opiate analgesic  11. PMHx includes: ADHD, anxiety, cardiomyopathy and nutritional diseases, chronic abdominal pain, central line associated bloodstream infection recurrent, anesthesia complication, difficulty swallowing, gastric ulcer unspecified as acute or chronic without mention of hemorrhage perforation or obstruction, gastroesophageal reflux disease, head injury, hiatal hernia, other bladder disorder, other chronic pain, postop nausea and vomiting, severe malnutrition on TPN, short gut syndrome, tobacco abuse  12. PSHx includes: Appendectomy, back surgery (11/3/2014), biopsy of cervical lymph node (2/20/2015), cholecystectomy, colonoscopy (2021, 2022), cervical anterior 1 level discectomy and fusion (2/15/2017), endoscopic insertion tube gastrostomy (9/9/2013), endoscopic ultrasound upper gastrointestinal tract (4/29/2011), endoscopic ultrasound upper gastrointestinal tract (9/9/2013), endoscopic ultrasound upper gastrointestinal tract (2/24/2021), endoscopy (3/25/2011, 8/4/2009, 1/5/2009), multiple EGDs, gastrectomy (6/22/2012), gastrojejunostomy (8/26/2009), umbilical hernia repair (2006), hernia raphe hiatal (6/22/2012), herniorrhaphy inguinal (11/22/2013), insert PICC line on the right (12/19/2019), insert PICC line right (2/21/2020), insert vascular access port on the right (12/19/2017, 8/2/2018), multiple interventional radiology CVC tunnel placement and removals, exploratory laparotomy (11/22/2013), orthopedic surgery, Celestino-en-Y gastric bypass (2003), tonsillectomy.        Plan:   1. Physical Therapy: none  2. Clinical Health Psychologist to address issues of relaxation,  behavioral change, coping style, and other factors important to improvement: none  3. Continue gentle movement at home  4. Diagnostic Studies: none  5. Medication Management:   6. Continue fentanyl patch 25mcg/hr every 48 hours, fill 5/31 and start 6/2  7. Oxycodone liquid 5mg/5ml,  use 5-10mg (5-10mls) every 4 hours as needed, max of 40mg (40ml) per day. Fill 5/31 and start 6/2  8. Further procedures recommended: none  9. Recommendations/follow-up for PCP:  See above  10. Release of information: none  11. Follow up: 12 weeks for in-person visit. Please call [phone] to make your follow-up appointment with me.       ASSESSMENT AND PLAN:  (R19.8) Visceral hyperalgesia  (primary encounter diagnosis)  Plan: fentaNYL (DURAGESIC) 25 mcg/hr 72 hr patch,         oxyCODONE (ROXICODONE) 5 MG/5ML solution            (R10.9,  G89.29) Chronic abdominal pain  Plan: fentaNYL (DURAGESIC) 25 mcg/hr 72 hr patch,         lidocaine (LIDODERM) 5 % patch, oxyCODONE         (ROXICODONE) 5 MG/5ML solution            (G89.4) Chronic pain syndrome  Plan: fentaNYL (DURAGESIC) 25 mcg/hr 72 hr patch,         lidocaine (LIDODERM) 5 % patch, oxyCODONE         (ROXICODONE) 5 MG/5ML solution           (M79.18) Myofascial pain  Plan: lidocaine (LIDODERM) 5 % patch            (M54.2) Cervicalgia  Plan: fentaNYL (DURAGESIC) 25 mcg/hr 72 hr patch,         oxyCODONE (ROXICODONE) 5 MG/5ML solution            (M54.50,  G89.29) Chronic bilateral low back pain without sciatica  Plan: fentaNYL (DURAGESIC) 25 mcg/hr 72 hr patch,         lidocaine (LIDODERM) 5 % patch, oxyCODONE         (ROXICODONE) 5 MG/5ML solution            (F11.90) Chronic, continuous use of opioids  Plan: fentaNYL (DURAGESIC) 25 mcg/hr 72 hr patch,         oxyCODONE (ROXICODONE) 5 MG/5ML solution              BILLING TIME DOCUMENTATION:   TOTAL TIME includes:   Time spent preparing to see the patient: 1 minutes (reviewing records and tests)  Time spend face to face with the patient:  31 minutes  Time spent ordering tests, medications, procedures and referrals: 0 minutes  Time spent Referring and communicating with other healthcare professionals: 0 minutes  Documenting clinical information in Epic: 3 minutes    The total TIME spent on this patient on the day of the appointment was 35 minutes.              Natalie MENARD, RN CNP, FNP  [facility]  [facility] [place]

## 2023-05-22 NOTE — TELEPHONE ENCOUNTER
"Cyanocobalamin-Refused, has refills for one year.  Last Written Prescription Date:  8/5/2019  Last Fill Quantity:1 ,  # refills: 11   Last office visit: 10/5/2018 with prescribing provider:      Future Office Visit:   Next 5 appointments (look out 90 days)    Oct 04, 2019  3:00 PM CDT  Office Visit with Chuy Isaac MD  [address] [address] [phone]         Requested Prescriptions   Pending Prescriptions Disp Refills     cyanocobalamin (CYANOCOBALAMIN) 1000 MCG/ML injection 1 mL 11     Sig: Inject 1 mL (1,000 mcg) into the muscle every 30 days       Vitamin Supplements (Adult) Protocol Passed - 9/26/2019 10:54 AM        Passed - High dose Vitamin D not ordered        Passed - Recent (12 mo) or future (30 days) visit within the authorizing provider's specialty     Patient has had an office visit with the authorizing provider or a provider within the authorizing providers department within the previous 12 mos or has a future within next 30 days. See \"Patient Info\" tab in inbasket, or \"Choose Columns\" in Meds & Orders section of the refill encounter.              Passed - Medication is active on med list       Ondansetron  Last Written Prescription Date:  7/25/2019  Last Fill Quantity: 90,  # refills: 1   Last office visit: 10/5/2018 with prescribing provider:      Future Office Visit:   Next 5 appointments (look out 90 days)    Oct 04, 2019  3:00 PM CDT  Office Visit with Chuy Isaac MD  [facility] [place] ([address] [address] [phone]              ondansetron (ZOFRAN-ODT) 8 MG ODT tab 90 tablet 1     Sig: DISSOLVE ONE TABLET ON TONGUE EVERY 8 HOURS AS NEEDED FOR NAUSEA        Antivertigo/Antiemetic Agents Passed - 9/26/2019 10:54 AM        Passed - Recent (12 mo) or future (30 days) visit within the authorizing provider's specialty     Patient has had an office " --The patient presents on referral for colonoscopy.     --A copy of this document will be sent to the referring provider.    --The patient has not had colonoscopy before.    --The patient does not have any risk factors for colon cancer, but is over the recommended age for screening.  There is no recent history of rectal bleeding.  The patient has no pertinent additional complaints of abdominal pain, constipation, diarrhea, or weight loss. "visit with the authorizing provider or a provider within the authorizing providers department within the previous 12 mos or has a future within next 30 days. See \"Patient Info\" tab in inbasket, or \"Choose Columns\" in Meds & Orders section of the refill encounter.              Passed - Medication is active on med list        Passed - Patient is 18 years of age or older          Prescription approved per [facility] Refill Protocol.      [place] RN on 9/27/2019 at 3:51 PM    "

## 2023-05-23 ENCOUNTER — OFFICE VISIT (OUTPATIENT)
Dept: PALLIATIVE MEDICINE | Facility: CLINIC | Age: 53
End: 2023-05-23
Attending: NURSE PRACTITIONER
Payer: COMMERCIAL

## 2023-05-23 VITALS — SYSTOLIC BLOOD PRESSURE: 151 MMHG | HEART RATE: 67 BPM | DIASTOLIC BLOOD PRESSURE: 102 MMHG

## 2023-05-23 DIAGNOSIS — M79.18 MYOFASCIAL PAIN: ICD-10-CM

## 2023-05-23 DIAGNOSIS — G89.29 CHRONIC BILATERAL LOW BACK PAIN WITHOUT SCIATICA: ICD-10-CM

## 2023-05-23 DIAGNOSIS — G89.29 CHRONIC ABDOMINAL PAIN: ICD-10-CM

## 2023-05-23 DIAGNOSIS — R19.8 VISCERAL HYPERALGESIA: Primary | ICD-10-CM

## 2023-05-23 DIAGNOSIS — R10.9 CHRONIC ABDOMINAL PAIN: ICD-10-CM

## 2023-05-23 DIAGNOSIS — F11.90 CHRONIC, CONTINUOUS USE OF OPIOIDS: ICD-10-CM

## 2023-05-23 DIAGNOSIS — G89.4 CHRONIC PAIN SYNDROME: ICD-10-CM

## 2023-05-23 DIAGNOSIS — M54.50 CHRONIC BILATERAL LOW BACK PAIN WITHOUT SCIATICA: ICD-10-CM

## 2023-05-23 DIAGNOSIS — M54.2 CERVICALGIA: ICD-10-CM

## 2023-05-23 PROCEDURE — 99214 OFFICE O/P EST MOD 30 MIN: CPT | Performed by: NURSE PRACTITIONER

## 2023-05-23 RX ORDER — FENTANYL 25 UG/1
1 PATCH TRANSDERMAL
Qty: 15 PATCH | Refills: 0 | Status: SHIPPED | OUTPATIENT
Start: 2023-05-23 | End: 2023-06-22

## 2023-05-23 RX ORDER — LIDOCAINE 50 MG/G
1-2 PATCH TOPICAL EVERY 24 HOURS
Qty: 60 PATCH | Refills: 11 | Status: SHIPPED | OUTPATIENT
Start: 2023-05-23 | End: 2023-09-05

## 2023-05-23 RX ORDER — OXYCODONE HCL 5 MG/5 ML
5-10 SOLUTION, ORAL ORAL EVERY 4 HOURS PRN
Qty: 1200 ML | Refills: 0 | Status: SHIPPED | OUTPATIENT
Start: 2023-05-23 | End: 2023-06-22

## 2023-05-23 NOTE — PATIENT INSTRUCTIONS
Plan:   Physical Therapy: none  Clinical Health Psychologist to address issues of relaxation, behavioral change, coping style, and other factors important to improvement: none  Continue gentle movement at home  Diagnostic Studies: none  Medication Management:   Continue fentanyl patch 25mcg/hr every 48 hours, fill 5/31 and start 6/2  Oxycodone liquid 5mg/5ml,  use 5-10mg (5-10mls) every 4 hours as needed, max of 40mg (40ml) per day. Fill 5/31 and start 6/2  Further procedures recommended: none  Recommendations/follow-up for PCP:  See above  Release of information: none  Follow up: 12 weeks for in-person visit. Please call [phone] to make your follow-up appointment with me.   Contact Primary Care Provider regarding your high blood pressure.     ----------------------------------------------------------------  Clinic Number:  [phone]   Call with any questions about your care and for scheduling assistance.   Calls are returned Monday through Friday between 8 AM and 4:30 PM. We usually get back to you within 2 business days depending on the issue/request.    If we are prescribing your medications:  For opioid medication refills, call the clinic or send a [employer] message 7 days in advance.  Please include:  Name of requested medication  Name of the pharmacy.  For non-opioid medications, call your pharmacy directly to request a refill. Please allow 3-4 days to be processed.   Per [place] State Law:  All controlled substance prescriptions must be filled within 30 days of being written.    For those controlled substances allowing refills, pickup must occur within 30 days of last fill.      We believe regular attendance is key to your success in our program!    Any time you are unable to keep your appointment we ask that you call us at least 24 hours in advance to cancel.This will allow us to offer the appointment time to another patient.   Multiple missed appointments may lead to dismissal from the clinic.

## 2023-05-24 ENCOUNTER — TELEPHONE (OUTPATIENT)
Dept: SURGERY | Facility: CLINIC | Age: 53
End: 2023-05-24
Payer: COMMERCIAL

## 2023-05-24 DIAGNOSIS — F90.0 ADHD (ATTENTION DEFICIT HYPERACTIVITY DISORDER), INATTENTIVE TYPE: ICD-10-CM

## 2023-05-24 DIAGNOSIS — I82.90 VTE (VENOUS THROMBOEMBOLISM): ICD-10-CM

## 2023-05-24 RX ORDER — DEXTROAMPHETAMINE SACCHARATE, AMPHETAMINE ASPARTATE, DEXTROAMPHETAMINE SULFATE AND AMPHETAMINE SULFATE 5; 5; 5; 5 MG/1; MG/1; MG/1; MG/1
TABLET ORAL
Qty: 30 TABLET | Refills: 0 | Status: SHIPPED | OUTPATIENT
Start: 2023-05-24 | End: 2023-06-22

## 2023-05-24 RX ORDER — LORAZEPAM 1 MG/1
TABLET ORAL
Qty: 30 TABLET | Refills: 0 | Status: SHIPPED | OUTPATIENT
Start: 2023-05-24 | End: 2023-06-22

## 2023-05-24 RX ORDER — ENOXAPARIN SODIUM 100 MG/ML
INJECTION SUBCUTANEOUS
Qty: 67.2 ML | Refills: 0 | Status: SHIPPED | OUTPATIENT
Start: 2023-05-24 | End: 2023-06-22

## 2023-05-24 NOTE — PROGRESS NOTES
This is a recent snapshot of the patient's [facility] medical record.  For current drug dose and complete information and questions, call [phone] or In Basket pool, [facility]v home infusion ([id number])  CSN Number:  [id number]

## 2023-05-25 ENCOUNTER — TELEPHONE (OUTPATIENT)
Dept: INTERNAL MEDICINE | Facility: CLINIC | Age: 53
End: 2023-05-25

## 2023-05-25 ENCOUNTER — PATIENT OUTREACH (OUTPATIENT)
Dept: CARE COORDINATION | Facility: CLINIC | Age: 53
End: 2023-05-25

## 2023-05-25 NOTE — PROGRESS NOTES
Clinic Care Coordination Contact  [facility]/Voicemail       Clinical Data: Care Coordinator Outreach  Outreach attempted x 1.  Left message on patient's voicemail with call back information and requested return call.  Plan: Care Coordinator sent care coordination introduction letter on 5/20/2022 via [employer] Care Coordinator will try to reach patient again in 10 business days.    Kinsey Muhammad RN Care Coordination   [facility] Yeyo Dove  Email: [email or web address]  Phone: [phone]

## 2023-05-25 NOTE — TELEPHONE ENCOUNTER
Medication reconciliation completed by RN. Form and chart forwarded to PCP for signatures    Genesis Ardon RN on 5/25/2023 at 1:45 PM

## 2023-05-25 NOTE — TELEPHONE ENCOUNTER
Reason for Call:  Form, our goal is to have forms completed with 72 hours, however, some forms may require a visit or additional information.    Type of letter, form or note:  Home Health Certification    Who is the form from?: Home care    Where did the form come from: form was faxed in    What clinic location was the form placed at?: [facility]    Where the form was placed: Given to MA/CHRISTY Rivera    What number is listed as a contact on the form?: [phone]       Additional comments: AZIZA    Call taken on 5/25/2023 at 10:21 AM by Kristin Jaimes

## 2023-05-25 NOTE — LETTER
[facility]  Patient Centered Plan of Care  About Me:        Patient Name:  Parker Acevedo    YOB: 1970  Age:         52 year old   [place] MRN:    [id number] Telephone Information:  Home Phone [phone]   Mobile [phone]       Address:  [address] [place] [address] Email address:  [email or web address]      Emergency Contact(s)    Name Relationship Lgl Grd Work Phone Home Phone Mobile Phone   1. BERTRAND RAMOS* Spouse No  [phone]    2. JEYSON CAIN * Relative No  [phone]   3. [place]* Mother No  [phone]           Primary language:  English     needed? No   [place] [facility]:  [phone] op. 1  Other communication barriers:Glasses; Physical impairment    Preferred Method of Communication:  MyChart  Current living arrangement: I live in a private home with spouse    Mobility Status/ Medical Equipment: Independent        Health Maintenance  Health Maintenance Reviewed: Not assessed      My Access Plan  Medical Emergency 911   Primary Clinic Line [facility]* - [phone]   24 Hour Appointment Line [phone] or  [phone]) (toll-free)   24 Hour Nurse Line [phone] (toll-free)   Preferred Urgent Care Other       Preferred Hospital [facility]  [phone]       Preferred Pharmacy [facility] [address]     Behavioral Health Crisis Line The National Suicide Prevention Lifeline at 1-310.702.1285 or Text/Call 968             My Care Team Members  Patient Care Team         Relationship Specialty Notifications Start End    Chuy Isaac MD PCP - General Internal Medicine  10/30/18     Phone: [phone] Fax: [phone]         [address] [facility] [address]    Patti Milligan MD (Inactive) MD Pain Clinic  6/2/15     Phone: [phone] Fax: [phone]          [address] [facility] [address] [facility] [address]    Chuy Isaac MD Assigned PCP   11/11/18     Phone: [phone] Fax: [phone]         [address] [facility] [address]    Marlyn Jenkins MD MD Ophthalmology  1/29/19     Phone: [phone] Fax: [phone]         [address] [facility] [address] [facility] [address]    Marlyn Jenkins MD MD Ophthalmology  2/11/19     Phone: [phone] Fax: [phone]         [address] [facility] [address] [facility] [address]    Kinsey Muhammad, RN Lead Care Coordinator  Admissions 5/20/22     Natalie Hull APRN CNP Assigned Neuroscience Provider   6/11/22     Phone: [phone] Fax: [phone] [address] [place] [address]    Beatriz Hancock, RN Registered Nurse   7/16/22     Eduardo Butler PA-C Assigned Musculoskeletal Provider   10/1/22     Phone: [phone] Fax: [phone]         [address] [facility] [address]    Sandra Serrano MD Assigned Infectious Disease Provider   12/31/22     Phone: [phone] Fax: [phone]         [address] [facility] [address]    Natalie Hull APRN CNP Assigned Pain Medication Provider   1/9/23     Phone: [phone] Fax: [phone]         [address] [place] [address]    Eduardo Maynard MD MD Cardiovascular Disease  1/31/23     Phone: [phone] Fax: [phone]         [address] [facility] [address]    Arabella Vines MD MD Cardiovascular Disease  5/17/23     Phone: [phone] Pager: [phone] Fax: [phone] [address] [address]              My Care Plans  Self Management and Treatment Plan  Care Plan  Care Plan: Annual Wellness       Problem: Appointment       Goal: I will complete my annual wellness visit.       Start Date: 5/20/2022 Expected End Date: 10/2/2022    This Visit's Progress: 10% Recent Progress: 10%    Note:     Barriers: complex care, and high volume of  appointments at baseline  Strengths: wife is pts main caregiver, and organizes his appointments.   Patient expressed understanding of goal: yes  Action steps to achieve this goal:  1. I will schedule my annual wellness visit; [phone] ([phone])  2. I will attend my annual wellness visit.  3. I will contact my Care Management or clinic team if I have barriers to attending my annual wellness visit.                                 Action Plans on File:                       Advance Care Plans/Directives Type:   Advanced Directive - On File      My Medical and Care Information  Problem List   Patient Active Problem List   Diagnosis    Peptic ulcer disease    Gastric bypass status for obesity    Chronic abdominal pain    Vomiting    Anemia    ADHD (attention deficit hyperactivity disorder), inattentive type    Vitamin B12 deficiency without anemia    Thiamine deficiency    Dehydration    Dysphagia    Weight loss, non-intentional    Malnutrition (H)    Chronic anxiety    Constipation    Bile reflux esophagitis    Former smoker    Vitamin D deficiency    Iron deficiency    Health Care Home    Chronic pain    Insomnia    Positive blood culture    Fungemia    Chronic nausea    Short gut syndrome    Anxiety    Status post cervical spinal arthrodesis    Port or reservoir infection, initial encounter    Abnormal echocardiogram    Low serum iron    Anemia, iron deficiency    Catheter-related bloodstream infection (CRBSI)    Generalized weakness    S/P bariatric surgery    Hyperlipidemia LDL goal <130    Bacteremia    Infection by Candida species    PICC line infiltration, sequela    Gram-positive bacteremia    On total parenteral nutrition    Gastric outlet obstruction    Short bowel syndrome    Bloodstream infection associated with central venous catheter, initial encounter    Fever, unknown origin    Acute deep vein thrombosis (DVT) of axillary vein of right upper extremity (H)    Bacteremia associated with  "intravascular line, initial encounter (H)    Gram-negative bacteremia      Current Medications and Allergies:    Allergies   Allergen Reactions    Bactrim [Sulfamethoxazole-Trimethoprim] Rash    Penicillins Anaphylaxis     Please see Antimicrobial Management Team allergy assessment note 10/10/2018. Patient reported tolerating amoxicillin.    Ertapenem Nausea and Vomiting    Doxycycline Rash    Vancomycin Rash     Rash after receiving vancomycin 3/28/16 (infusion reaction?). Tolerated with slower infusion and diphenhydramine premed.     Current Outpatient Medications   Medication    albuterol (PROAIR HFA/PROVENTIL HFA/VENTOLIN HFA) 108 (90 Base) MCG/ACT inhaler    amphetamine-dextroamphetamine (ADDERALL) 20 MG tablet    carvedilol (COREG) 6.25 MG tablet    cyanocobalamin (CYANOCOBALAMIN) 1000 MCG/ML injection    enoxaparin ANTICOAGULANT (LOVENOX) 80 MG/0.8ML syringe    EPINEPHrine (ANY BX GENERIC EQUIV) 0.3 MG/0.3ML injection 2-pack    [facility] INFUSION MANAGED PATIENT    fentaNYL (DURAGESIC) 25 mcg/hr 72 hr patch    lidocaine (LIDODERM) 5 % patch    LORazepam (ATIVAN) 1 MG tablet    multivitamin, therapeutic (THERA-VIT) TABS tablet    naloxone (NARCAN) 4 MG/0.1ML nasal spray    nystatin (MYCOSTATIN) 366118 UNIT/GM external cream    ondansetron (ZOFRAN ODT) 8 MG ODT tab    oxyCODONE (ROXICODONE) 5 MG/5ML solution    oxyCODONE (ROXICODONE) 5 MG/5ML solution    pantoprazole (PROTONIX) 40 MG EC tablet    polyethylene glycol (MIRALAX) 17 GM/Dose powder    sucralfate (CARAFATE) 1 GM/10ML suspension    Syringe/Needle, Disp, (B-D ECLIPSE SYRINGE) 27G X 1/2\" 1 ML MISC    vitamin D2 (ERGOCALCIFEROL) 43302 units (1250 mcg) capsule     No current facility-administered medications for this visit.         Care Coordination Start Date: 5/20/2022   Frequency of Care Coordination: monthly       Form Last Updated: 05/25/2023       "

## 2023-05-26 DIAGNOSIS — Z53.9 DIAGNOSIS NOT YET DEFINED: Primary | ICD-10-CM

## 2023-05-26 PROCEDURE — G0179 MD RECERTIFICATION HHA PT: HCPCS | Performed by: INTERNAL MEDICINE

## 2023-05-26 NOTE — TELEPHONE ENCOUNTER
Rescheduled colonoscopy    Attempted to contact patient regarding upcoming Colonoscopy  procedure on 6.12.23 for pre assessment questions. No answer.     Left message to return call to [phone] #4    Discuss Covid policy and designated  policy.    Pre op exam? N/A    Arrival time: 1315. Procedure time: 1415    [facility] [address] [place] [address], 5[address] Floor, [place] [address]    Sedation type: MAC    NSAIDs? No NSAID medications per patient's medication list.  RN will verify with pre-assessment call.    Anticoagulants: No    Electronic implanted devices? No    Diabetic? No    Indication for procedure: Gram-positive bacteremia    Bowel prep recommendation: Extended prep Golytely      Prep instructions sent via [employer]. Bowel prep script previously sent to [facility] - [place], [place] - [address] -inquire if patient has this.    Beth Angeles RN  Endoscopy Procedure Pre Assessment RN

## 2023-05-26 NOTE — PLAN OF CARE
Problem: Patient Care Overview  Goal: Discharge Needs Assessment  Discharge  Pt is alert and oriented x4.T max 100.6 orally,Ronnie Dueñas 2 PA is aware and PS stated to give him tylenol.Abdominal and back pain controlled with oxycodone 15 mg tab po Q 4 hrs PRN. Pt had double lumen PICC placement this morning for home IV vancomycin.Discharge instruction was revised with pt and significant other, pt and significant other stated that they understood discharge instruction.Pt to  discharge med from discharge pharmacy.Pt to continue to take med and to continue to follow up with clinic appointments per discharge instruction.Significant other to give pt ride home.      
Problem: Patient Care Overview  Goal: Plan of Care/Patient Progress Review  5A PT: PT orders acknowledged and appreciated. Consulted with RN and patient in AM. Patient up independently in hallways with no concerns. Reviewed role of skilled PT in hospital with patient. Patient feels he can amb and do stairs necessary for home environment with no concerns. No acute care skilled PT needs identified. Deferred.      
Problem: Patient Care Overview  Goal: Plan of Care/Patient Progress Review  OT: eval orders received, per consult with PT, no needs for inpt OT. Pt is ind with ADLS, ambulating ind, and declines therapies during inpatient stay-feels he is at baseline and no concerns. Orders completed.      
Problem: Patient Care Overview  Goal: Plan of Care/Patient Progress Review  Outcome: Improving  AOx4. Able to make needs known. Ambulating frequently outside to smoke. Oxycodone prn given x2 for back pain. Zofran x1. On full liquid diet. Drinking some. G tube clamped. Fentanyl patch in place. Potassium replaced. Need echo.      
Problem: Patient Care Overview  Goal: Plan of Care/Patient Progress Review  Outcome: No Change   06/01/18 2729   OTHER   Plan Of Care Reviewed With patient;spouse   Plan of Care Review   Progress no change     Goal: Individualization & Mutuality  Outcome: No Change  Patient came to 5A from the ED. Patient has 2 PIV and right PIV is infusing. Patient received benadryl 25 mg po for premedication for vancomycin and patient is ok to infuse at the right PIV. Fentanyl patch(s) are at the left upper arm. Continue with plan of care and notify MD for status changes       
Problem: Patient Care Overview  Goal: Plan of Care/Patient Progress Review  Outcome: No Change   06/01/18 7820   OTHER   Plan Of Care Reviewed With patient;spouse   Plan of Care Review   Progress no change     Goal: Individualization & Mutuality  Outcome: No Change  Patient admitted to: 5A.  Admitted from: ED.  Arrived by: wheelchair.   Reason for admission: Blood infection.   Patient accompanied by: Wife.   Belongings: With the patient in the room.   Teaching: Done by RN and patient & wife verbalized understanding of the teaching.           
Problem: Patient Care Overview  Goal: Plan of Care/Patient Progress Review  Outcome: No Change  Alert and oriented x 4. Complained of back pain with relief from prn pain med. Up independently in the hallway. Cm port intact. Premedicated with Benadryl prior to Vancomycin infusion. No itchiness reported overnight. Wife ar bedside overnight. For echo. Vital signs stable. Will continue to monitor and follow plan of care      
Problem: Patient Care Overview  Goal: Plan of Care/Patient Progress Review  Outcome: No Change  Alert and oriented x 4. Up independently . Leaves the unit at times. No PIV access available, refused insertion of PIV access. Vancomycin IV not given, Dr. Garza informed. G tube in place. For PICC insertion today and possible discharge after. Woke up around 6 am feeling nauseated and requested 02 at 2 lpm via nasal cannula. Vital signs stable. Will continue to monitor and follow plan of care        
Problem: Patient Care Overview  Goal: Plan of Care/Patient Progress Review  Outcome: No Change  Alert and oriented x 4. Up independently . Leaves the unit at times. Seen by Ronnie Mckeon regarding L arm pain at PIV access site. Maintenance fluid and IV Vanco administered via Cm Port at R chest. Port accessed by Ruslan HARRISON. Pre medicated with Benadry prior to Vanco infusion. Complains of chronic back pain with relief from prn med. G tube in place. Vital signs stable. Will continue to monitor and follow plan of care        
Problem: Patient Care Overview  Goal: Plan of Care/Patient Progress Review  Outcome: No Change  Alert and oriented x 4. Up independently in the hallway. Complained of lower back pain with partial relief from prn pain med. Referred for itchiness while Vancomycin is running, extra dose of Benadryl given with relief. Dr. Garza informed of blood culture ( Hector ) taken on 6/1 is growing gram  + cocci, no new order. Vital signs stable. Will cotninue to monitor and follow plan of care       
Problem: Patient Care Overview  Goal: Plan of Care/Patient Progress Review  Outcome: No Change  Alert, orientated, up ind. and outside, pt daryn-key tube exchanged by bariatric surgery at bedside, c/o abd pain and generalized pain with relief from oxycodone, remains on IV Vanco via PIV, premedicate with benadryl 30 minutes prior, afebrile, VSS, good po intake, dressing changed to azevedo as was falling off      
Problem: Patient Care Overview  Goal: Plan of Care/Patient Progress Review  Outcome: No Change.  Blood pressure 118/76, pulse 65, temperature 97.3  F (36.3  C), temperature source Oral, resp. rate 16, weight 89.4 kg (197 lb 3.2 oz), SpO2 98 %.    Pt here for bacteremia, port infection. Pt is A&Ox4, able to make needs known. Chronic back pain managed with PRN Oxycodone. Pt is up independently, ad vitor. Leaves the unit at times to smoke. Pt has L PIV with good blood return. Pt concerned for loss of access. NONO on for protection.. Pre-medicated with Benadry prior to Vanco infusions. Pt is TPN dependent at baseline. Will need new access if possible prior to discharge. G tube in place, currently not in use. VSS. Will continue to monitor and follow plan of care        
Problem: Patient Care Overview  Goal: Plan of Care/Patient Progress Review  Pt A&Ox4. VSS on RA. L PIV infusing maintenance w/ ABX intermittently. L arm has new bruising w/ un-open sore, MD aware, dressing applied. Noted that Pt picks at skin w/ scabs. C/o pain , gave PRN oxy 1X. C/o dizziness w/ standing , MD aware. Up independently, No BM this shift. Plan for cath placement. BC pending. Will continue w/POC       
Problem: Patient Care Overview  Goal: Plan of Care/Patient Progress Review  Pt A&Ox4. VSS on RA. Up independently bathroom, 1 loose stool reported per Pt. New L PIV placed w/ NO NO per pt request. D5 0.45Nacl maintenance w/ intermittent IV Vanco. R azevedo removed in IR, tip test for cultures. R deltoid has fentanyl patches in place. G tube dressing intact. Will continue w/ POC       
Problem: Patient Care Overview  Goal: Plan of Care/Patient Progress Review  Pt AO. VSS on RA. Up IND. C/o abd pain, managed with Oxy. Some nausea, managed with Zofran. Tolerating PO. Voiding. G tube intact. Pt disconnecting self from IV, advised not to do so. New PIV placed this evening. Receiving IV abx. Plan for PICC placement tomorrow and poss d/c.        
within normal limits
within normal limits

## 2023-05-28 ENCOUNTER — HOSPITAL ENCOUNTER (EMERGENCY)
Facility: CLINIC | Age: 53
Discharge: HOME | End: 2023-05-28
Attending: EMERGENCY MEDICINE | Admitting: EMERGENCY MEDICINE
Payer: COMMERCIAL

## 2023-05-28 VITALS
HEART RATE: 70 BPM | RESPIRATION RATE: 16 BRPM | DIASTOLIC BLOOD PRESSURE: 89 MMHG | OXYGEN SATURATION: 98 % | TEMPERATURE: 97.9 F | WEIGHT: 187.1 LBS | BODY MASS INDEX: 26.1 KG/M2 | SYSTOLIC BLOOD PRESSURE: 146 MMHG

## 2023-05-28 DIAGNOSIS — R00.2 PALPITATIONS: ICD-10-CM

## 2023-05-28 DIAGNOSIS — R11.2 NAUSEA AND VOMITING, UNSPECIFIED VOMITING TYPE: ICD-10-CM

## 2023-05-28 DIAGNOSIS — F41.9 ANXIETY: ICD-10-CM

## 2023-05-28 LAB
ALBUMIN SERPL BCG-MCNC: 3.8 G/DL (ref 3.5–5.2)
ALP SERPL-CCNC: 64 U/L (ref 40–129)
ALT SERPL W P-5'-P-CCNC: 20 U/L (ref 10–50)
ANION GAP SERPL CALCULATED.3IONS-SCNC: 9 MMOL/L (ref 7–15)
AST SERPL W P-5'-P-CCNC: 19 U/L (ref 10–50)
BASOPHILS # BLD AUTO: 0.1 10E3/UL (ref 0–0.2)
BASOPHILS NFR BLD AUTO: 1 %
BILIRUB SERPL-MCNC: 0.4 MG/DL
BUN SERPL-MCNC: 10.3 MG/DL (ref 6–20)
CALCIUM SERPL-MCNC: 8.4 MG/DL (ref 8.6–10)
CHLORIDE SERPL-SCNC: 103 MMOL/L (ref 98–107)
CREAT SERPL-MCNC: 0.7 MG/DL (ref 0.67–1.17)
DEPRECATED HCO3 PLAS-SCNC: 27 MMOL/L (ref 22–29)
EOSINOPHIL # BLD AUTO: 0.1 10E3/UL (ref 0–0.7)
EOSINOPHIL NFR BLD AUTO: 1 %
ERYTHROCYTE [DISTWIDTH] IN BLOOD BY AUTOMATED COUNT: 15 % (ref 10–15)
GFR SERPL CREATININE-BSD FRML MDRD: >90 ML/MIN/1.73M2
GLUCOSE SERPL-MCNC: 98 MG/DL (ref 70–99)
HCT VFR BLD AUTO: 38.2 % (ref 40–53)
HGB BLD-MCNC: 12.4 G/DL (ref 13.3–17.7)
IMM GRANULOCYTES # BLD: 0 10E3/UL
IMM GRANULOCYTES NFR BLD: 0 %
LIPASE SERPL-CCNC: 20 U/L (ref 13–60)
LYMPHOCYTES # BLD AUTO: 2.2 10E3/UL (ref 0.8–5.3)
LYMPHOCYTES NFR BLD AUTO: 30 %
MCH RBC QN AUTO: 29.6 PG (ref 26.5–33)
MCHC RBC AUTO-ENTMCNC: 32.5 G/DL (ref 31.5–36.5)
MCV RBC AUTO: 91 FL (ref 78–100)
MONOCYTES # BLD AUTO: 0.8 10E3/UL (ref 0–1.3)
MONOCYTES NFR BLD AUTO: 11 %
NEUTROPHILS # BLD AUTO: 4.1 10E3/UL (ref 1.6–8.3)
NEUTROPHILS NFR BLD AUTO: 57 %
NRBC # BLD AUTO: 0 10E3/UL
NRBC BLD AUTO-RTO: 0 /100
PLATELET # BLD AUTO: 199 10E3/UL (ref 150–450)
POTASSIUM SERPL-SCNC: 3.9 MMOL/L (ref 3.4–5.3)
PROT SERPL-MCNC: 6.4 G/DL (ref 6.4–8.3)
RBC # BLD AUTO: 4.19 10E6/UL (ref 4.4–5.9)
SODIUM SERPL-SCNC: 139 MMOL/L (ref 136–145)
WBC # BLD AUTO: 7.2 10E3/UL (ref 4–11)

## 2023-05-28 PROCEDURE — 85025 COMPLETE CBC W/AUTO DIFF WBC: CPT | Performed by: EMERGENCY MEDICINE

## 2023-05-28 PROCEDURE — 99284 EMERGENCY DEPT VISIT MOD MDM: CPT | Mod: 25 | Performed by: EMERGENCY MEDICINE

## 2023-05-28 PROCEDURE — 80053 COMPREHEN METABOLIC PANEL: CPT | Performed by: EMERGENCY MEDICINE

## 2023-05-28 PROCEDURE — 96375 TX/PRO/DX INJ NEW DRUG ADDON: CPT | Performed by: EMERGENCY MEDICINE

## 2023-05-28 PROCEDURE — 83690 ASSAY OF LIPASE: CPT | Performed by: EMERGENCY MEDICINE

## 2023-05-28 PROCEDURE — 93005 ELECTROCARDIOGRAM TRACING: CPT | Performed by: EMERGENCY MEDICINE

## 2023-05-28 PROCEDURE — 93010 ELECTROCARDIOGRAM REPORT: CPT | Performed by: EMERGENCY MEDICINE

## 2023-05-28 PROCEDURE — 250N000011 HC RX IP 250 OP 636: Performed by: EMERGENCY MEDICINE

## 2023-05-28 PROCEDURE — 96361 HYDRATE IV INFUSION ADD-ON: CPT | Performed by: EMERGENCY MEDICINE

## 2023-05-28 PROCEDURE — 36415 COLL VENOUS BLD VENIPUNCTURE: CPT | Performed by: EMERGENCY MEDICINE

## 2023-05-28 PROCEDURE — 96374 THER/PROPH/DIAG INJ IV PUSH: CPT | Performed by: EMERGENCY MEDICINE

## 2023-05-28 PROCEDURE — 258N000003 HC RX IP 258 OP 636: Performed by: EMERGENCY MEDICINE

## 2023-05-28 RX ORDER — HEPARIN SODIUM,PORCINE 10 UNIT/ML
5-10 VIAL (ML) INTRAVENOUS
Status: DISCONTINUED | OUTPATIENT
Start: 2023-05-28 | End: 2023-05-28 | Stop reason: HOSPADM

## 2023-05-28 RX ORDER — LORAZEPAM 2 MG/ML
1 INJECTION INTRAMUSCULAR ONCE
Status: COMPLETED | OUTPATIENT
Start: 2023-05-28 | End: 2023-05-28

## 2023-05-28 RX ORDER — ONDANSETRON 2 MG/ML
4 INJECTION INTRAMUSCULAR; INTRAVENOUS ONCE
Status: COMPLETED | OUTPATIENT
Start: 2023-05-28 | End: 2023-05-28

## 2023-05-28 RX ADMIN — LORAZEPAM 1 MG: 2 INJECTION INTRAMUSCULAR; INTRAVENOUS at 18:46

## 2023-05-28 RX ADMIN — SODIUM CHLORIDE, PRESERVATIVE FREE 5 ML: 5 INJECTION INTRAVENOUS at 20:15

## 2023-05-28 RX ADMIN — SODIUM CHLORIDE 1000 ML: 9 INJECTION, SOLUTION INTRAVENOUS at 18:37

## 2023-05-28 RX ADMIN — PROCHLORPERAZINE EDISYLATE 10 MG: 5 INJECTION INTRAMUSCULAR; INTRAVENOUS at 19:42

## 2023-05-28 ASSESSMENT — ACTIVITIES OF DAILY LIVING (ADL): ADLS_ACUITY_SCORE: 37

## 2023-05-28 NOTE — ED PROVIDER NOTES
History     Chief Complaint   Patient presents with     Nausea & Vomiting     Palpitations     HPI  Parker Acevedo is a 52 year old male who presents with some nausea and vomiting.  This occurred today.  He has been unable to hold much down.  He is also had some loose stools which are chronic for him.  Diarrhea this morning.  He also felt some palpitations today when walking up the stairs.  Long history of gastric difficulties.  He had gastric bypass that went bad he states.  He has a Cm catheter in place for TPN.  Also with a J-tube in place for drainage.  He does not eat much orally normally.  He has significant baseline anxiety.  He states his anxiety is an 8 out of 10 today.    Allergies:  Allergies   Allergen Reactions     Bactrim [Sulfamethoxazole-Trimethoprim] Rash     Penicillins Anaphylaxis     Please see Antimicrobial Management Team allergy assessment note 10/10/2018. Patient reported tolerating amoxicillin.     Ertapenem Nausea and Vomiting     Doxycycline Rash     Vancomycin Rash     Rash after receiving vancomycin 3/28/16 (infusion reaction?). Tolerated with slower infusion and diphenhydramine premed.       Problem List:    Patient Active Problem List    Diagnosis Date Noted     Gram-negative bacteremia 07/07/2022     Priority: Medium     Bacteremia associated with intravascular line, initial encounter (H) 05/07/2022     Priority: Medium     Acute deep vein thrombosis (DVT) of axillary vein of right upper extremity (H) 02/28/2022     Priority: Medium     Fever, unknown origin 03/19/2021     Priority: Medium     Short bowel syndrome 01/30/2021     Priority: Medium     Bloodstream infection associated with central venous catheter, initial encounter 01/30/2021     Priority: Medium     Gastric outlet obstruction 10/07/2020     Priority: Medium     Added automatically from request for surgery [id number]       Gram-positive bacteremia 09/29/2019     Priority: Medium     On total parenteral nutrition  09/29/2019     Priority: Medium     Added automatically from request for surgery [id number]       PICC line infiltration, sequela 07/22/2019     Priority: Medium     Infection by Candida species 01/04/2019     Priority: Medium     Bacteremia 10/10/2018     Priority: Medium     Hyperlipidemia LDL goal <130 08/07/2018     Priority: Medium     S/P bariatric surgery 07/30/2018     Priority: Medium     Generalized weakness 01/30/2018     Priority: Medium     Catheter-related bloodstream infection (CRBSI) 09/23/2017     Priority: Medium     Low serum iron 09/08/2017     Priority: Medium     Anemia, iron deficiency 09/08/2017     Priority: Medium     Abnormal echocardiogram 04/11/2017     Priority: Medium     Port or reservoir infection, initial encounter 03/16/2017     Priority: Medium     Status post cervical spinal arthrodesis 02/15/2017     Priority: Medium     Short gut syndrome      Priority: Medium     Anxiety      Priority: Medium     Chronic nausea 05/10/2016     Priority: Medium     Fungemia 04/11/2016     Priority: Medium     Positive blood culture 03/29/2016     Priority: Medium     Insomnia 08/13/2015     Priority: Medium     Chronic pain 07/07/2015     Priority: Medium     Patient is followed by Dr. Milligan for ongoing prescription of pain medication.  All refills should be approved by this provider, or covering partner.    Medication(s): Fentanyl 50 mcg patches every three days/ Liquid oxycodone 5 mg/5ml up to 50 mg daily.   Maximum quantity per month: as per Dr. Milligan through [facility]  Clinic visit frequency required: Q 3 months at Pain Management    Controlled substance agreement on file: Yes       Date(s): Through Pain Management    Pain Clinic evaluation in the past: Yes       Date(s):  Ongoing every three months       Location(s):  Per pain management    DIRE Total Score(s):  No flowsheet data found.    Last [facility] website verification:  Through Pain Management    [email or web address]    Esteban Daly MD             Health Care Home 02/24/2015     Priority: Medium              Iron deficiency 05/23/2014     Priority: Medium     Vitamin D deficiency 05/22/2014     Priority: Medium     Former smoker 02/24/2014     Priority: Medium     Bile reflux esophagitis 10/16/2013     Priority: Medium     Constipation 10/01/2013     Priority: Medium     Chronic anxiety 09/25/2013     Priority: Medium     Dysphagia 09/17/2013     Priority: Medium     Weight loss, non-intentional 09/17/2013     Priority: Medium     Malnutrition (H) 09/17/2013     Priority: Medium     Dehydration 08/28/2013     Priority: Medium     Vitamin B12 deficiency without anemia 07/31/2013     Priority: Medium     Diagnosis updated by automated process. Provider to review and confirm.       Thiamine deficiency 07/31/2013     Priority: Medium     ADHD (attention deficit hyperactivity disorder), inattentive type 07/02/2013     Priority: Medium     Patient is followed by RICCO SHARP for ongoing prescription of stimulants.  All refills should be approved by this provider, or covering partner.    Medication(s): Adderall.   Maximum quantity per month: 30  Clinic visit frequency required:      Controlled substance agreement on file: No  Neuropsych evaluation for ADD completed:  No    Last [facility] website verification:  done on 5/6/19  [email or web address]         Anemia 09/20/2012     Priority: Medium     Vomiting 06/28/2012     Priority: Medium     Chronic abdominal pain 06/01/2012     Priority: Medium     Patient is followed by ALEXANDRIA WHITLEY for ongoing prescription of narcotic pain medicine.  Med: liquid oxycodone up to 60 mg per day.   Maximum use per month:   Expected duration: ongoing, hope per surgery that will resolve with upcoming surgery  Narcotic agreement on file: YES  Clinic visit recommended: Q 3 months         Peptic ulcer disease 04/08/2010     Priority: Medium     Gastric  bypass status for obesity 04/08/2010     Priority: Medium     Top weight of 492.          Past Medical History:    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)      Chronic abdominal pain      CLABSI (central line-associated bloodstream infection)      Complication of anesthesia      Difficulty swallowing      Gastric ulcer, unspecified as acute or chronic, without mention of hemorrhage, perforation, or obstruction      Gastro-oesophageal reflux disease      Head injury      Hiatal hernia      Other bladder disorder      Other chronic pain      PONV (postoperative nausea and vomiting)      Severe malnutrition (H)      Short gut syndrome      Tobacco abuse        Past Surgical History:    Past Surgical History:   Procedure Laterality Date     AMPUTATION       APPENDECTOMY       BACK SURGERY  11/3/2014    curve in the spine     BIOPSY LYMPH NODE CERVICAL N/A 2/20/2015    Procedure: BIOPSY LYMPH NODE CERVICAL;  Surgeon: Baron Scanlon MD;  [facility]     CHOLECYSTECTOMY       COLONOSCOPY N/A 7/14/2021    Procedure: COLONOSCOPY;  Surgeon: Jimbo Estrada MD;  [facility]     COLONOSCOPY N/A 4/13/2022    Procedure: COLONOSCOPY;  Surgeon: Jimbo Estrada MD;  Location: [facility] OR     DISCECTOMY, FUSION CERVICAL ANTERIOR ONE LEVEL, COMBINED N/A 2/15/2017    Procedure: COMBINED DISCECTOMY, FUSION CERVICAL ANTERIOR ONE LEVEL;  Surgeon: Darren Campos MD;  [facility]     ENDOSCOPIC INSERTION TUBE GASTROSTOMY  9/9/2013    Procedure: ENDOSCOPIC INSERTION TUBE GASTROSTOMY;;  Surgeon: Francis Vyas MD;  [facility]     ENDOSCOPIC ULTRASOUND UPPER GASTROINTESTINAL TRACT (GI)  4/29/2011    Procedure:ENDOSCOPIC ULTRASOUND UPPER GASTROINTESTINAL TRACT (GI); Both Procedures done Conjointly; Surgeon:NEREIDA HOUSER; [facility]     ENDOSCOPIC ULTRASOUND UPPER  GASTROINTESTINAL TRACT (GI)  9/9/2013    Procedure: ENDOSCOPIC ULTRASOUND UPPER GASTROINTESTINAL TRACT (GI);  Endoscopic Ultrasound Guide Gastrostomy Tube Placement  C-arm;  Surgeon: Noe Lizarraga MD;  [facility]     ENDOSCOPIC ULTRASOUND UPPER GASTROINTESTINAL TRACT (GI) N/A 2/24/2021    Procedure: ENDOSCOPIC ULTRASOUND, ESOPHAGOSCOPY / UPPER GASTROINTESTINAL TRACT (GI), esophagastrogastroduodenoscopy;  Surgeon: Berny Bach MD;  [facility]     ENDOSCOPY  03/25/11    EGD, [facility]     ENDOSCOPY  08/04/09    Upper Endoscopy, [facility]     ENDOSCOPY  01/05/09    Upper Endoscopy, [facility]     ESOPHAGOSCOPY, GASTROSCOPY, DUODENOSCOPY (EGD), COMBINED  4/20/2011    Procedure:COMBINED ESOPHAGOSCOPY, GASTROSCOPY, DUODENOSCOPY (EGD); Surgeon:BLU VYAS; [facility]     ESOPHAGOSCOPY, GASTROSCOPY, DUODENOSCOPY (EGD), COMBINED  6/15/2011    Procedure:COMBINED ESOPHAGOSCOPY, GASTROSCOPY, DUODENOSCOPY (EGD); Surgeon:BLU VYAS; [facility]     ESOPHAGOSCOPY, GASTROSCOPY, DUODENOSCOPY (EGD), COMBINED  6/12/2013    Procedure: COMBINED ESOPHAGOSCOPY, GASTROSCOPY, DUODENOSCOPY (EGD);;  Surgeon: Blu Vyas MD;  [facility]     ESOPHAGOSCOPY, GASTROSCOPY, DUODENOSCOPY (EGD), COMBINED  11/22/2013    Procedure: COMBINED ESOPHAGOSCOPY, GASTROSCOPY, DUODENOSCOPY (EGD);;  Surgeon: Blu Vyas MD;  [facility]     ESOPHAGOSCOPY, GASTROSCOPY, DUODENOSCOPY (EGD), COMBINED  4/30/2014    Procedure: COMBINED ESOPHAGOSCOPY, GASTROSCOPY, DUODENOSCOPY (EGD);  Surgeon: Blu Vyas MD;  [facility]     ESOPHAGOSCOPY, GASTROSCOPY, DUODENOSCOPY (EGD), COMBINED N/A 2/20/2015    Procedure: COMBINED ESOPHAGOSCOPY, GASTROSCOPY, DUODENOSCOPY (EGD), BIOPSY SINGLE OR MULTIPLE;  Surgeon: Baron Scanlon MD;  [facility]     ESOPHAGOSCOPY, GASTROSCOPY, DUODENOSCOPY (EGD), COMBINED N/A 9/30/2015    Procedure: COMBINED ESOPHAGOSCOPY,  GASTROSCOPY, DUODENOSCOPY (EGD);  Surgeon: Francis Vyas MD;  Location:  GI     ESOPHAGOSCOPY, GASTROSCOPY, DUODENOSCOPY (EGD), COMBINED N/A 10/3/2019    Procedure: Upper Endoscopy;  Surgeon: Clif Morrow MD;  [facility]     GASTRECTOMY  6/22/2012    Procedure: GASTRECTOMY;  Open Approach, Excise Ulcers,Partial Gastrectomy, Esophagojejunostomy, Hiatal Hernia Repair, Extensive Lysis of Adhesions and Esaphagogastrodudenoscopy.;  Surgeon: Francis Vyas MD;  [facility]     GASTROJEJUNOSTOMY  08/26/09    Extensice enterolysis, partial resect. jejunum, part. resect gastric pouch, gastrojejunostomy anastomosis     HC ESOPH/GAS REFLUX TEST W NASAL IMPED ELECTRODE  8/5/2013    Procedure: ESOPHAGEAL IMPEDENCE FUNCTION TEST 1 HOUR OR LESS;  Surgeon: Halie Lang MD;  Location:  GI     HEAD & NECK SURGERY  2/15/2017    C5-C6     HERNIA REPAIR  2006    Umbilical hernia     HERNIORRHAPHY HIATAL  6/22/2012    Procedure: HERNIORRHAPHY HIATAL;;  Surgeon: Francis Vyas MD;  [facility]     HERNIORRHAPHY INGUINAL  11/22/2013    Procedure: HERNIORRHAPHY INGUINAL;;  Surgeon: Francis Vyas MD;  [facility]     INSERT PICC LINE Right 12/19/2019    Procedure: Picc Placement;  Surgeon: Per Dumont PA-C;  [facility]     INSERT PICC LINE Right 2/21/2020    Procedure: INSERTION, PICC;  Surgeon: Per Dumont PA-C;  [facility]     INSERT PORT VASCULAR ACCESS Right 12/19/2017    Procedure: INSERT PORT VASCULAR ACCESS;  Right Chest Port Placement ;  Surgeon: Lisandro Alejandro PA-C;  [facility]     INSERT PORT VASCULAR ACCESS Right 8/2/2018    Procedure: INSERT PORT VASCULAR ACCESS;  Place single lumen tunneled central venous access catheter;  Surgeon: Guy Jamil PA-C;  [facility]     IR CVC TUNNEL PLACEMENT > 5 YRS OF AGE  8/7/2019     IR CVC TUNNEL PLACEMENT > 5 YRS OF AGE  4/14/2020     IR CVC TUNNEL PLACEMENT > 5 YRS OF  AGE  8/3/2020     IR CVC TUNNEL PLACEMENT > 5 YRS OF AGE  9/4/2020     IR CVC TUNNEL PLACEMENT > 5 YRS OF AGE  2/5/2021     IR CVC TUNNEL PLACEMENT > 5 YRS OF AGE  3/23/2021     IR CVC TUNNEL PLACEMENT > 5 YRS OF AGE  1/13/2022     IR CVC TUNNEL PLACEMENT > 5 YRS OF AGE  5/12/2022     IR CVC TUNNEL PLACEMENT > 5 YRS OF AGE  7/13/2022     IR CVC TUNNEL REMOVAL RIGHT  10/1/2019     IR CVC TUNNEL REMOVAL RIGHT  7/30/2020     IR CVC TUNNEL REMOVAL RIGHT  9/2/2020     IR CVC TUNNEL REMOVAL RIGHT  2/3/2021     IR CVC TUNNEL REMOVAL RIGHT  3/19/2021     IR CVC TUNNEL REMOVAL RIGHT  1/10/2022     IR CVC TUNNEL REMOVAL RIGHT  5/9/2022     IR CVC TUNNEL REMOVAL RIGHT  7/8/2022     IR CVC TUNNEL REVISION LEFT  8/10/2022     IR CVC TUNNEL REVISION RIGHT  5/7/2021     IR FOLLOW UP VISIT OUTPATIENT  8/7/2019     IR PICC PLACEMENT > 5 YRS OF AGE  3/7/2019     IR PICC PLACEMENT > 5 YRS OF AGE  12/19/2019     IR PICC PLACEMENT > 5 YRS OF AGE  2/21/2020     LAPAROTOMY EXPLORATORY  11/22/2013    Procedure: LAPAROTOMY EXPLORATORY;  Exploratory Laparotomy, Upper Endoscopy, Left Inguinal Hernia Repair;  Surgeon: Francis Vyas MD;  [facility]     ORTHOPEDIC SURGERY       PICC INSERTION Right 03/16/2017    5fr DL BioFlo PICC, 42cm (3cm external) in the R medial brachial vein w/ tip in the SVC RA junction.     PICC INSERTION Left 09/23/2017    5fr DL BioFlo PICC, 45cm (1cm external) in the L basilic vein w/ tip in the SVC RA junction.     PICC INSERTION Right 05/16/2019    5Fr - 43cm, Medial brachial vein, low SVC     PICC INSERTION Right 10/02/2019    5Fr - 43cm (2cm external), basilic vein, low SVC     SHAYLEE EN Y BOWEL  2003     SOFT TISSUE SURGERY       THORACIC SURGERY       TONSILLECTOMY       TRANSESOPHAGEAL ECHOCARDIOGRAM INTRAOPERATIVE N/A 1/8/2019    Procedure: TRANSESOPHAGEAL ECHOCARDIOGRAM INTRAOPERATIVE;  Surgeon: GENERIC ANESTHESIA PROVIDER;  [facility]     ZZC GASTRIC BYPASS,OBESE<100CM SHAYLEE-EN-Y  2002    lost  300 pounds       Family History:    Family History   Problem Relation Age of Onset     Gastrointestinal Disease Mother         Crohns disease     Anxiety Disorder Mother      Thyroid Disease Mother         Grave's disease     Cancer Father         ear cancer-skin cancer/melanoma     Breast Cancer Maternal Grandmother      Macular Degeneration Maternal Grandfather      Anxiety Disorder Sister      Diabetes Maternal Uncle      Breast Cancer Other      Hypertension No family hx of      Hyperlipidemia No family hx of      Cerebrovascular Disease No family hx of      Prostate Cancer No family hx of      Depression No family hx of      Anesthesia Reaction No family hx of      Asthma No family hx of      Osteoporosis No family hx of      Genetic Disorder No family hx of      Obesity No family hx of      Mental Illness No family hx of      Substance Abuse No family hx of      Glaucoma No family hx of        Social History:  Marital Status:   [2]  Social History     Tobacco Use     Smoking status: Light Smoker     Packs/day: 0.25     Years: 3.00     Pack years: 0.75     Types: Cigarettes     Smokeless tobacco: Never     Tobacco comments:     2/4/2021    smokes 3 cigarettes/day   Vaping Use     Vaping status: Never Used   Substance Use Topics     Alcohol use: No     Comment: quit 2002     Drug use: No        Medications:    albuterol (PROAIR HFA/PROVENTIL HFA/VENTOLIN HFA) 108 (90 Base) MCG/ACT inhaler  amphetamine-dextroamphetamine (ADDERALL) 20 MG tablet  carvedilol (COREG) 6.25 MG tablet  cyanocobalamin (CYANOCOBALAMIN) 1000 MCG/ML injection  enoxaparin ANTICOAGULANT (LOVENOX) 80 MG/0.8ML syringe  EPINEPHrine (ANY BX GENERIC EQUIV) 0.3 MG/0.3ML injection 2-pack  [place] [facility]  fentaNYL (DURAGESIC) 25 mcg/hr 72 hr patch  lidocaine (LIDODERM) 5 % patch  LORazepam (ATIVAN) 1 MG tablet  multivitamin, therapeutic (THERA-VIT) TABS tablet  naloxone (NARCAN) 4 MG/0.1ML nasal spray  nystatin  "(MYCOSTATIN) 522871 UNIT/GM external cream  ondansetron (ZOFRAN ODT) 8 MG ODT tab  oxyCODONE (ROXICODONE) 5 MG/5ML solution  oxyCODONE (ROXICODONE) 5 MG/5ML solution  pantoprazole (PROTONIX) 40 MG EC tablet  polyethylene glycol (MIRALAX) 17 GM/Dose powder  sucralfate (CARAFATE) 1 GM/10ML suspension  Syringe/Needle, Disp, (B-D ECLIPSE SYRINGE) 27G X 1/2\" 1 ML MISC  vitamin D2 (ERGOCALCIFEROL) 83510 units (1250 mcg) capsule          Review of Systems  All other systems are reviewed and are negative    Physical Exam   BP: (!) 146/89  Pulse: 71  Temp: 97.9  F (36.6  C)  Resp: 18  Weight: 84.9 kg (187 lb 1.6 oz)  SpO2: 98 %      Physical Exam  Vitals and nursing note reviewed.   Constitutional:       General: He is not in acute distress.     Appearance: He is well-developed. He is not diaphoretic.   HENT:      Head: Normocephalic and atraumatic.   Eyes:      General: No scleral icterus.        Right eye: No discharge.         Left eye: No discharge.      Conjunctiva/sclera: Conjunctivae normal.   Cardiovascular:      Rate and Rhythm: Normal rate and regular rhythm.      Heart sounds: Normal heart sounds. No murmur heard.  Pulmonary:      Effort: Pulmonary effort is normal. No respiratory distress.      Breath sounds: Normal breath sounds. No stridor.   Abdominal:      Palpations: Abdomen is soft.      Tenderness: There is no abdominal tenderness.   Musculoskeletal:         General: Normal range of motion.      Cervical back: Normal range of motion and neck supple.   Skin:     General: Skin is warm and dry.      Coloration: Skin is not pale.      Findings: No erythema or rash.   Neurological:      Mental Status: He is alert.      Cranial Nerves: No cranial nerve deficit.      Motor: No abnormal muscle tone.   Psychiatric:         Mood and Affect: Mood is anxious.         ED Course                 Procedures    EKG reveals normal sinus rhythm at 66 bpm.  No acute ST segment or T wave changes noted.  Interpreted by " myself           Results for orders placed or performed during the hospital encounter of 05/28/23 (from the past 24 hour(s))   CBC with platelets differential    Narrative    The following orders were created for panel order CBC with platelets differential.  Procedure                               Abnormality         Status                     ---------                               -----------         ------                     CBC with platelets and d...[[id number]]  Abnormal            Final result                 Please view results for these tests on the individual orders.   Comprehensive metabolic panel   Result Value Ref Range    Sodium 139 136 - 145 mmol/L    Potassium 3.9 3.4 - 5.3 mmol/L    Chloride 103 98 - 107 mmol/L    Carbon Dioxide (CO2) 27 22 - 29 mmol/L    Anion Gap 9 7 - 15 mmol/L    Urea Nitrogen 10.3 6.0 - 20.0 mg/dL    Creatinine 0.70 0.67 - 1.17 mg/dL    Calcium 8.4 (L) 8.6 - 10.0 mg/dL    Glucose 98 70 - 99 mg/dL    Alkaline Phosphatase 64 40 - 129 U/L    AST 19 10 - 50 U/L    ALT 20 10 - 50 U/L    Protein Total 6.4 6.4 - 8.3 g/dL    Albumin 3.8 3.5 - 5.2 g/dL    Bilirubin Total 0.4 <=1.2 mg/dL    GFR Estimate >90 >60 mL/min/1.73m2   Lipase   Result Value Ref Range    Lipase 20 13 - 60 U/L   CBC with platelets and differential   Result Value Ref Range    WBC Count 7.2 4.0 - 11.0 10e3/uL    RBC Count 4.19 (L) 4.40 - 5.90 10e6/uL    Hemoglobin 12.4 (L) 13.3 - 17.7 g/dL    Hematocrit 38.2 (L) 40.0 - 53.0 %    MCV 91 78 - 100 fL    MCH 29.6 26.5 - 33.0 pg    MCHC 32.5 31.5 - 36.5 g/dL    RDW 15.0 10.0 - 15.0 %    Platelet Count 199 150 - 450 10e3/uL    % Neutrophils 57 %    % Lymphocytes 30 %    % Monocytes 11 %    % Eosinophils 1 %    % Basophils 1 %    % Immature Granulocytes 0 %    NRBCs per 100 WBC 0 <1 /100    Absolute Neutrophils 4.1 1.6 - 8.3 10e3/uL    Absolute Lymphocytes 2.2 0.8 - 5.3 10e3/uL    Absolute Monocytes 0.8 0.0 - 1.3 10e3/uL    Absolute Eosinophils 0.1 0.0 - 0.7 10e3/uL     Absolute Basophils 0.1 0.0 - 0.2 10e3/uL    Absolute Immature Granulocytes 0.0 <=0.4 10e3/uL    Absolute NRBCs 0.0 10e3/uL     *Note: Due to a large number of results and/or encounters for the requested time period, some results have not been displayed. A complete set of results can be found in Results Review.       Medications   sodium chloride (PF) 0.9% PF flush 10-20 mL (has no administration in time range)   heparin lock flush 10 UNIT/ML injection 5-10 mL (has no administration in time range)   ondansetron (ZOFRAN) injection 4 mg (4 mg Intravenous Not Given 5/28/23 1917)   LORazepam (ATIVAN) injection 1 mg (1 mg Intravenous $Given 5/28/23 1846)   0.9% sodium chloride BOLUS (0 mLs Intravenous Stopped 5/28/23 1958)   prochlorperazine (COMPAZINE) injection 10 mg (10 mg Intravenous $Given 5/28/23 1942)       Assessments & Plan (with Medical Decision Making)  52-year-old male with complicated past medical history who presented with anxiety, palpitations and some vomiting at home.  He was given a single dose of IV Ativan, treated with IV fluids.  Exam was stable.  EKG revealed no arrhythmia.  Improved and wanted to return home after Ativan and Compazine.  Follow-up in clinic if not improving in 2 days.  No arrhythmias seen on cardiac monitor.  Return anytime sooner the emergency department condition worsens or any other concern     I have reviewed the nursing notes.    I have reviewed the findings, diagnosis, plan and need for follow up with the patient.        New Prescriptions    No medications on file       Final diagnoses:   Anxiety   Palpitations   Nausea and vomiting, unspecified vomiting type       5/28/2023   [facility] EMERGENCY DEPT     Oliver Rosario MD  05/28/23 2016

## 2023-05-28 NOTE — ED TRIAGE NOTES
Pt has been sick for a couple of days and is having nausea and vomiting and reports having some shortness of breath with his heart feeling like it is palpating at times

## 2023-05-29 NOTE — ED NOTES
Patient calling for nausea medication. States Zofran is not as effective for him, would like Compazine and Benadryl. Advised that writer will inform the provider and return with medications once ordered. Patient and visitor express understanding.

## 2023-05-30 ENCOUNTER — PATIENT OUTREACH (OUTPATIENT)
Dept: CARE COORDINATION | Facility: CLINIC | Age: 53
End: 2023-05-30
Payer: COMMERCIAL

## 2023-05-30 DIAGNOSIS — R06.02 SOB (SHORTNESS OF BREATH): ICD-10-CM

## 2023-05-30 NOTE — PROGRESS NOTES
Clinic Care Coordination Contact    Situation: Patient chart reviewed by care coordinator.    Background: Patient is actively enrolled in care coordination.     Assessment: Patient was in the emergency room on 5/28/2023 for nausea, vomiting and anxiety. Patient was treated with IV medications and fluids. He was feeling better at discharge.     Plan/Recommendations: RN CC will reach out to patient at next scheduled outreach.     Kinsey Muhammad RN Care Coordination   [facility]  Email: [email or web address]  Phone: [phone]

## 2023-05-31 ENCOUNTER — TELEPHONE (OUTPATIENT)
Dept: INTERVENTIONAL RADIOLOGY/VASCULAR | Facility: CLINIC | Age: 53
End: 2023-05-31
Payer: COMMERCIAL

## 2023-06-01 ENCOUNTER — HOSPITAL ENCOUNTER (EMERGENCY)
Facility: CLINIC | Age: 53
Discharge: HOME | End: 2023-06-01
Attending: FAMILY MEDICINE | Admitting: FAMILY MEDICINE
Payer: COMMERCIAL

## 2023-06-01 VITALS
SYSTOLIC BLOOD PRESSURE: 121 MMHG | WEIGHT: 189.4 LBS | BODY MASS INDEX: 26.42 KG/M2 | OXYGEN SATURATION: 98 % | RESPIRATION RATE: 20 BRPM | TEMPERATURE: 99 F | DIASTOLIC BLOOD PRESSURE: 78 MMHG | HEART RATE: 90 BPM

## 2023-06-01 DIAGNOSIS — S01.311A LACERATION OF RIGHT EAR CANAL, INITIAL ENCOUNTER: ICD-10-CM

## 2023-06-01 PROCEDURE — 99282 EMERGENCY DEPT VISIT SF MDM: CPT | Performed by: FAMILY MEDICINE

## 2023-06-01 PROCEDURE — 99283 EMERGENCY DEPT VISIT LOW MDM: CPT | Performed by: FAMILY MEDICINE

## 2023-06-01 RX ORDER — ALBUTEROL SULFATE 90 UG/1
AEROSOL, METERED RESPIRATORY (INHALATION)
Qty: 18 G | Refills: 1 | Status: SHIPPED | OUTPATIENT
Start: 2023-06-01 | End: 2023-06-02

## 2023-06-01 ASSESSMENT — ACTIVITIES OF DAILY LIVING (ADL): ADLS_ACUITY_SCORE: 35

## 2023-06-01 NOTE — TELEPHONE ENCOUNTER
Patients wife called back for pre assessment. Consent to communicate is on file.     Pre assessment questions completed for upcoming Colonoscopy  procedure scheduled on 6/12/23    COVID policy reviewed.     Pre-op exam? N/A    Reviewed procedural arrival time 1315, procedure time 1415 and facility location [facility]; [address] [place] [address], [address], [place] [address]    Designated  policy reviewed. Instructed to have someone stay 24 hours post procedure.     NSAIDs? No    Anticoagulation/blood thinners? Yes Enoxaparin (Lovenox). Holding interval of 24 hours See note from Dr. Estrada. Patient will not be holding lovenox. Will come back if they find anything that needs to be removed.    Electronic implanted devices? No    Diabetic? No    Procedure indication: strep bovus bacteremia    Bowel prep recommendation: Extended prep Golytely with BID miralax d/t previous poor prep     Patients wife states he did the extended prep last time and had no output. After discussing this with patients wife, patient started drinking golytley x 1 WEEK prior to procedure as he cannot drink large volumes. Patient did finish x 1.5 jugs of golytley, but it was over the span of a week.   Patient currently takes miralax daily with no output.     Message is being sent to scoping provider to discuss different plan for patient.     DID NOT review procedure prep instructions.     Patient verbalized understanding and had no questions or concerns at this time.    Radha Gutierrez RN  Endoscopy Procedure Pre Assessment RN

## 2023-06-01 NOTE — ED TRIAGE NOTES
Pt reports a pop sound and then started having blood come from his right ear, he is on blood thinners so was concerned

## 2023-06-01 NOTE — ED PROVIDER NOTES
History     Chief Complaint   Patient presents with     Ear Drainage     HPI  Parker Acevedo is a 52 year old male who presents with concerns of blood coming from his right ear.  Patient states that he just woke up and noticed the blood coming from there.  Says he felt a pop initially before this happened.  Denies any fevers or chills.  Was not having trouble hearing before this happened.  Patient is on a blood thinner so was concerned about that.    Allergies:  Allergies   Allergen Reactions     Bactrim [Sulfamethoxazole-Trimethoprim] Rash     Penicillins Anaphylaxis     Please see Antimicrobial Management Team allergy assessment note 10/10/2018. Patient reported tolerating amoxicillin.     Ertapenem Nausea and Vomiting     Doxycycline Rash     Vancomycin Rash     Rash after receiving vancomycin 3/28/16 (infusion reaction?). Tolerated with slower infusion and diphenhydramine premed.       Problem List:    Patient Active Problem List    Diagnosis Date Noted     Gram-negative bacteremia 07/07/2022     Priority: Medium     Bacteremia associated with intravascular line, initial encounter (H) 05/07/2022     Priority: Medium     Acute deep vein thrombosis (DVT) of axillary vein of right upper extremity (H) 02/28/2022     Priority: Medium     Fever, unknown origin 03/19/2021     Priority: Medium     Short bowel syndrome 01/30/2021     Priority: Medium     Bloodstream infection associated with central venous catheter, initial encounter 01/30/2021     Priority: Medium     Gastric outlet obstruction 10/07/2020     Priority: Medium     Added automatically from request for surgery [id number]       Gram-positive bacteremia 09/29/2019     Priority: Medium     On total parenteral nutrition 09/29/2019     Priority: Medium     Added automatically from request for surgery [id number]       PICC line infiltration, sequela 07/22/2019     Priority: Medium     Infection by Candida species 01/04/2019     Priority: Medium     Bacteremia  10/10/2018     Priority: Medium     Hyperlipidemia LDL goal <130 08/07/2018     Priority: Medium     S/P bariatric surgery 07/30/2018     Priority: Medium     Generalized weakness 01/30/2018     Priority: Medium     Catheter-related bloodstream infection (CRBSI) 09/23/2017     Priority: Medium     Low serum iron 09/08/2017     Priority: Medium     Anemia, iron deficiency 09/08/2017     Priority: Medium     Abnormal echocardiogram 04/11/2017     Priority: Medium     Port or reservoir infection, initial encounter 03/16/2017     Priority: Medium     Status post cervical spinal arthrodesis 02/15/2017     Priority: Medium     Short gut syndrome      Priority: Medium     Anxiety      Priority: Medium     Chronic nausea 05/10/2016     Priority: Medium     Fungemia 04/11/2016     Priority: Medium     Positive blood culture 03/29/2016     Priority: Medium     Insomnia 08/13/2015     Priority: Medium     Chronic pain 07/07/2015     Priority: Medium     Patient is followed by Dr. Milligan for ongoing prescription of pain medication.  All refills should be approved by this provider, or covering partner.    Medication(s): Fentanyl 50 mcg patches every three days/ Liquid oxycodone 5 mg/5ml up to 50 mg daily.   Maximum quantity per month: as per Dr. Milligan through [facility]  Clinic visit frequency required: Q 3 months at Pain Management    Controlled substance agreement on file: Yes       Date(s): Through Pain Management    Pain Clinic evaluation in the past: Yes       Date(s):  Ongoing every three months       Location(s):  Per pain management    DIRE Total Score(s):  No flowsheet data found.    Last [facility] website verification:  Through Pain Management   [email or web address]    Esteban Daly MD             Health Care Home 02/24/2015     Priority: Medium              Iron deficiency 05/23/2014     Priority: Medium     Vitamin D deficiency 05/22/2014     Priority: Medium     Former smoker 02/24/2014      Priority: Medium     Bile reflux esophagitis 10/16/2013     Priority: Medium     Constipation 10/01/2013     Priority: Medium     Chronic anxiety 09/25/2013     Priority: Medium     Dysphagia 09/17/2013     Priority: Medium     Weight loss, non-intentional 09/17/2013     Priority: Medium     Malnutrition (H) 09/17/2013     Priority: Medium     Dehydration 08/28/2013     Priority: Medium     Vitamin B12 deficiency without anemia 07/31/2013     Priority: Medium     Diagnosis updated by automated process. Provider to review and confirm.       Thiamine deficiency 07/31/2013     Priority: Medium     ADHD (attention deficit hyperactivity disorder), inattentive type 07/02/2013     Priority: Medium     Patient is followed by RICCO SHARP for ongoing prescription of stimulants.  All refills should be approved by this provider, or covering partner.    Medication(s): Adderall.   Maximum quantity per month: 30  Clinic visit frequency required:      Controlled substance agreement on file: No  Neuropsych evaluation for ADD completed:  No    Last [facility] website verification:  done on 5/6/19  [email or web address]         Anemia 09/20/2012     Priority: Medium     Vomiting 06/28/2012     Priority: Medium     Chronic abdominal pain 06/01/2012     Priority: Medium     Patient is followed by ALEXANDRIA WHITLEY for ongoing prescription of narcotic pain medicine.  Med: liquid oxycodone up to 60 mg per day.   Maximum use per month:   Expected duration: ongoing, hope per surgery that will resolve with upcoming surgery  Narcotic agreement on file: YES  Clinic visit recommended: Q 3 months         Peptic ulcer disease 04/08/2010     Priority: Medium     Gastric bypass status for obesity 04/08/2010     Priority: Medium     Top weight of 492.          Past Medical History:    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)       Chronic abdominal pain      CLABSI (central line-associated bloodstream infection)      Complication of anesthesia      Difficulty swallowing      Gastric ulcer, unspecified as acute or chronic, without mention of hemorrhage, perforation, or obstruction      Gastro-oesophageal reflux disease      Head injury      Hiatal hernia      Other bladder disorder      Other chronic pain      PONV (postoperative nausea and vomiting)      Severe malnutrition (H)      Short gut syndrome      Tobacco abuse        Past Surgical History:    Past Surgical History:   Procedure Laterality Date     AMPUTATION       APPENDECTOMY       BACK SURGERY  11/3/2014    curve in the spine     BIOPSY LYMPH NODE CERVICAL N/A 2/20/2015    Procedure: BIOPSY LYMPH NODE CERVICAL;  Surgeon: Baron Scanlon MD;  [facility]     CHOLECYSTECTOMY       COLONOSCOPY N/A 7/14/2021    Procedure: COLONOSCOPY;  Surgeon: Jimbo Estrada MD;  [facility]: [facility] OR     COLONOSCOPY N/A 4/13/2022    Procedure: COLONOSCOPY;  Surgeon: Jimbo Estrada MD;  Location: [facility] OR     DISCECTOMY, FUSION CERVICAL ANTERIOR ONE LEVEL, COMBINED N/A 2/15/2017    Procedure: COMBINED DISCECTOMY, FUSION CERVICAL ANTERIOR ONE LEVEL;  Surgeon: Darren Campos MD;  [facility]     ENDOSCOPIC INSERTION TUBE GASTROSTOMY  9/9/2013    Procedure: ENDOSCOPIC INSERTION TUBE GASTROSTOMY;;  Surgeon: Francis Vyas MD;  [facility]     ENDOSCOPIC ULTRASOUND UPPER GASTROINTESTINAL TRACT (GI)  4/29/2011    Procedure:ENDOSCOPIC ULTRASOUND UPPER GASTROINTESTINAL TRACT (GI); Both Procedures done Conjointly; Surgeon:NEREIDA HOUSER; [facility]     ENDOSCOPIC ULTRASOUND UPPER GASTROINTESTINAL TRACT (GI)  9/9/2013    Procedure: ENDOSCOPIC ULTRASOUND UPPER GASTROINTESTINAL TRACT (GI);  Endoscopic Ultrasound Guide Gastrostomy Tube Placement  C-arm;  Surgeon: Noe Lizarraga MD;  [facility]     ENDOSCOPIC ULTRASOUND UPPER GASTROINTESTINAL TRACT (GI)  N/A 2/24/2021    Procedure: ENDOSCOPIC ULTRASOUND, ESOPHAGOSCOPY / UPPER GASTROINTESTINAL TRACT (GI), esophagastrogastroduodenoscopy;  Surgeon: Berny Bach MD;  Location:  OR     ENDOSCOPY  03/25/11    EGD, [facility]     ENDOSCOPY  08/04/09    Upper Endoscopy, [facility]     ENDOSCOPY  01/05/09    Upper Endoscopy, [facility]     ESOPHAGOSCOPY, GASTROSCOPY, DUODENOSCOPY (EGD), COMBINED  4/20/2011    Procedure:COMBINED ESOPHAGOSCOPY, GASTROSCOPY, DUODENOSCOPY (EGD); Surgeon:BLU VYAS; Location: GI     ESOPHAGOSCOPY, GASTROSCOPY, DUODENOSCOPY (EGD), COMBINED  6/15/2011    Procedure:COMBINED ESOPHAGOSCOPY, GASTROSCOPY, DUODENOSCOPY (EGD); Surgeon:BLU VYAS; Location: GI     ESOPHAGOSCOPY, GASTROSCOPY, DUODENOSCOPY (EGD), COMBINED  6/12/2013    Procedure: COMBINED ESOPHAGOSCOPY, GASTROSCOPY, DUODENOSCOPY (EGD);;  Surgeon: Blu Vyas MD;  Location:  GI     ESOPHAGOSCOPY, GASTROSCOPY, DUODENOSCOPY (EGD), COMBINED  11/22/2013    Procedure: COMBINED ESOPHAGOSCOPY, GASTROSCOPY, DUODENOSCOPY (EGD);;  Surgeon: Blu Vyas MD;  Location:  OR     ESOPHAGOSCOPY, GASTROSCOPY, DUODENOSCOPY (EGD), COMBINED  4/30/2014    Procedure: COMBINED ESOPHAGOSCOPY, GASTROSCOPY, DUODENOSCOPY (EGD);  Surgeon: Blu Vyas MD;  Location:  GI     ESOPHAGOSCOPY, GASTROSCOPY, DUODENOSCOPY (EGD), COMBINED N/A 2/20/2015    Procedure: COMBINED ESOPHAGOSCOPY, GASTROSCOPY, DUODENOSCOPY (EGD), BIOPSY SINGLE OR MULTIPLE;  Surgeon: Baron Scanlon MD;  Location:  OR     ESOPHAGOSCOPY, GASTROSCOPY, DUODENOSCOPY (EGD), COMBINED N/A 9/30/2015    Procedure: COMBINED ESOPHAGOSCOPY, GASTROSCOPY, DUODENOSCOPY (EGD);  Surgeon: Blu Vyas MD;  [facility]     ESOPHAGOSCOPY, GASTROSCOPY, DUODENOSCOPY (EGD), COMBINED N/A 10/3/2019    Procedure: Upper Endoscopy;  Surgeon: Clif Morrow MD;  [facility]     GASTRECTOMY  6/22/2012     Procedure: GASTRECTOMY;  Open Approach, Excise Ulcers,Partial Gastrectomy, Esophagojejunostomy, Hiatal Hernia Repair, Extensive Lysis of Adhesions and Esaphagogastrodudenoscopy.;  Surgeon: Francis Vyas MD;  [facility]     GASTROJEJUNOSTOMY  08/26/09    Extensice enterolysis, partial resect. jejunum, part. resect gastric pouch, gastrojejunostomy anastomosis     HC ESOPH/GAS REFLUX TEST W NASAL IMPED ELECTRODE  8/5/2013    Procedure: ESOPHAGEAL IMPEDENCE FUNCTION TEST 1 HOUR OR LESS;  Surgeon: Halie Lang MD;  [facility]     HEAD & NECK SURGERY  2/15/2017    C5-C6     HERNIA REPAIR  2006    Umbilical hernia     HERNIORRHAPHY HIATAL  6/22/2012    Procedure: HERNIORRHAPHY HIATAL;;  Surgeon: Francis Vyas MD;  [facility]     HERNIORRHAPHY INGUINAL  11/22/2013    Procedure: HERNIORRHAPHY INGUINAL;;  Surgeon: Francis Vyas MD;  [facility]     INSERT PICC LINE Right 12/19/2019    Procedure: Picc Placement;  Surgeon: Per Dumont PA-C;  [facility]     INSERT PICC LINE Right 2/21/2020    Procedure: INSERTION, PICC;  Surgeon: Per Dumont PA-C;  [facility]     INSERT PORT VASCULAR ACCESS Right 12/19/2017    Procedure: INSERT PORT VASCULAR ACCESS;  Right Chest Port Placement ;  Surgeon: Lisandro Alejandro PA-C;  [facility]     INSERT PORT VASCULAR ACCESS Right 8/2/2018    Procedure: INSERT PORT VASCULAR ACCESS;  Place single lumen tunneled central venous access catheter;  Surgeon: Guy Jamil PA-C;  [facility]     IR CVC TUNNEL PLACEMENT > 5 YRS OF AGE  8/7/2019     IR CVC TUNNEL PLACEMENT > 5 YRS OF AGE  4/14/2020     IR CVC TUNNEL PLACEMENT > 5 YRS OF AGE  8/3/2020     IR CVC TUNNEL PLACEMENT > 5 YRS OF AGE  9/4/2020     IR CVC TUNNEL PLACEMENT > 5 YRS OF AGE  2/5/2021     IR CVC TUNNEL PLACEMENT > 5 YRS OF AGE  3/23/2021     IR CVC TUNNEL PLACEMENT > 5 YRS OF AGE  1/13/2022     IR CVC TUNNEL PLACEMENT > 5 YRS OF AGE   5/12/2022     IR CVC TUNNEL PLACEMENT > 5 YRS OF AGE  7/13/2022     IR CVC TUNNEL REMOVAL RIGHT  10/1/2019     IR CVC TUNNEL REMOVAL RIGHT  7/30/2020     IR CVC TUNNEL REMOVAL RIGHT  9/2/2020     IR CVC TUNNEL REMOVAL RIGHT  2/3/2021     IR CVC TUNNEL REMOVAL RIGHT  3/19/2021     IR CVC TUNNEL REMOVAL RIGHT  1/10/2022     IR CVC TUNNEL REMOVAL RIGHT  5/9/2022     IR CVC TUNNEL REMOVAL RIGHT  7/8/2022     IR CVC TUNNEL REVISION LEFT  8/10/2022     IR CVC TUNNEL REVISION RIGHT  5/7/2021     IR FOLLOW UP VISIT OUTPATIENT  8/7/2019     IR PICC PLACEMENT > 5 YRS OF AGE  3/7/2019     IR PICC PLACEMENT > 5 YRS OF AGE  12/19/2019     IR PICC PLACEMENT > 5 YRS OF AGE  2/21/2020     LAPAROTOMY EXPLORATORY  11/22/2013    Procedure: LAPAROTOMY EXPLORATORY;  Exploratory Laparotomy, Upper Endoscopy, Left Inguinal Hernia Repair;  Surgeon: Francis Vyas MD;  [facility]     ORTHOPEDIC SURGERY       PICC INSERTION Right 03/16/2017    5fr DL BioFlo PICC, 42cm (3cm external) in the R medial brachial vein w/ tip in the SVC RA junction.     PICC INSERTION Left 09/23/2017    5fr DL BioFlo PICC, 45cm (1cm external) in the L basilic vein w/ tip in the SVC RA junction.     PICC INSERTION Right 05/16/2019    5Fr - 43cm, Medial brachial vein, low SVC     PICC INSERTION Right 10/02/2019    5Fr - 43cm (2cm external), basilic vein, low SVC     SHAYLEE EN Y BOWEL  2003     SOFT TISSUE SURGERY       THORACIC SURGERY       TONSILLECTOMY       TRANSESOPHAGEAL ECHOCARDIOGRAM INTRAOPERATIVE N/A 1/8/2019    Procedure: TRANSESOPHAGEAL ECHOCARDIOGRAM INTRAOPERATIVE;  Surgeon: GENERIC ANESTHESIA PROVIDER;  [facility]     ZZC GASTRIC BYPASS,OBESE<100CM SHAYLEE-EN-Y  2002    lost 300 pounds       Family History:    Family History   Problem Relation Age of Onset     Gastrointestinal Disease Mother         Crohns disease     Anxiety Disorder Mother      Thyroid Disease Mother         Grave's disease     Cancer Father         ear cancer-skin  cancer/melanoma     Breast Cancer Maternal Grandmother      Macular Degeneration Maternal Grandfather      Anxiety Disorder Sister      Diabetes Maternal Uncle      Breast Cancer Other      Hypertension No family hx of      Hyperlipidemia No family hx of      Cerebrovascular Disease No family hx of      Prostate Cancer No family hx of      Depression No family hx of      Anesthesia Reaction No family hx of      Asthma No family hx of      Osteoporosis No family hx of      Genetic Disorder No family hx of      Obesity No family hx of      Mental Illness No family hx of      Substance Abuse No family hx of      Glaucoma No family hx of        Social History:  Marital Status:   [2]  Social History     Tobacco Use     Smoking status: Light Smoker     Packs/day: 0.50     Years: 3.00     Pack years: 1.50     Types: Cigarettes     Smokeless tobacco: Never     Tobacco comments:     2/4/2021    smokes 3 cigarettes/day   Vaping Use     Vaping status: Never Used   Substance Use Topics     Alcohol use: No     Comment: quit 2002     Drug use: No        Medications:    amphetamine-dextroamphetamine (ADDERALL) 20 MG tablet  carvedilol (COREG) 6.25 MG tablet  cyanocobalamin (CYANOCOBALAMIN) 1000 MCG/ML injection  enoxaparin ANTICOAGULANT (LOVENOX) 80 MG/0.8ML syringe  EPINEPHrine (ANY BX GENERIC EQUIV) 0.3 MG/0.3ML injection 2-pack  [facility] INFUSION MANAGED PATIENT  fentaNYL (DURAGESIC) 25 mcg/hr 72 hr patch  lidocaine (LIDODERM) 5 % patch  LORazepam (ATIVAN) 1 MG tablet  multivitamin, therapeutic (THERA-VIT) TABS tablet  naloxone (NARCAN) 4 MG/0.1ML nasal spray  nystatin (MYCOSTATIN) 821342 UNIT/GM external cream  ondansetron (ZOFRAN ODT) 8 MG ODT tab  oxyCODONE (ROXICODONE) 5 MG/5ML solution  oxyCODONE (ROXICODONE) 5 MG/5ML solution  pantoprazole (PROTONIX) 40 MG EC tablet  polyethylene glycol (MIRALAX) 17 GM/Dose powder  sucralfate (CARAFATE) 1 GM/10ML suspension  Syringe/Needle, Disp, (B-D ECLIPSE SYRINGE) 27G X  "1/2\" 1 ML MISC  VENTOLIN  (90 Base) MCG/ACT inhaler  vitamin D2 (ERGOCALCIFEROL) 80510 units (1250 mcg) capsule          Review of Systems   All other systems reviewed and are negative.      Physical Exam   BP: 121/78  Pulse: 90  Temp: 99  F (37.2  C)  Resp: 20  Weight: 85.9 kg (189 lb 6.4 oz)  SpO2: 98 %      Physical Exam  Vitals and nursing note reviewed.   Constitutional:       Appearance: Normal appearance.   HENT:      Right Ear: Laceration present. No hemotympanum. Tympanic membrane is not injected, scarred, perforated, erythematous, retracted or bulging.      Left Ear: Hearing, tympanic membrane, ear canal and external ear normal.      Ears:      Comments: There appears to be a laceration on the top part of the right ear canal where blood is present.  The eardrum is completely clear, no other lesions noted.  Skin:     General: Skin is warm and dry.   Neurological:      Mental Status: He is alert.         ED Course                 Procedures           No results found. However, due to the size of the patient record, not all encounters were searched. Please check Results Review for a complete set of results.    Medications - No data to display     Exam seems consistent with a laceration to the top part of the ear canal.  I think patient scratched it in his ear inadvertently.  I have put a small piece of Gelfoam adhered to the area to allow this to heal up and put some gauze over the ear to prevent him from scratching in the ear any further.  This should get it to heal up.  Patient was told if that form is still there after couple days he could gently irrigate his ear.  Patient will follow-up with his doctor if there is no improvement over the next few days    Assessments & Plan (with Medical Decision Making)  Ear canal laceration     I have reviewed the nursing notes.    I have reviewed the findings, diagnosis, plan and need for follow up with the patient.      New Prescriptions    No medications on " file       Final diagnoses:   Laceration of right ear canal, initial encounter       6/1/2023   [facility] EMERGENCY DEPT     Oseas Delgado MD  06/01/23 1136

## 2023-06-01 NOTE — DISCHARGE INSTRUCTIONS
1.  Leave the gauze and bandage in your ear for the rest of the day.  There is a piece of foam in your ear that will slowly dissolve and allow the area to heal up.  If it still there after couple days you could gently irrigate your ear to get that to come out.  Please see your doctor if there is any further concerns in the next 5 days.

## 2023-06-01 NOTE — PROGRESS NOTES
This is a recent snapshot of the patient's [facility] medical record.  For current drug dose and complete information and questions, call [phone] or In Basket pool, [facility] ([id number])  CSN Number:  [id number]

## 2023-06-02 ENCOUNTER — HEALTH MAINTENANCE LETTER (OUTPATIENT)
Age: 53
End: 2023-06-02

## 2023-06-02 ENCOUNTER — PATIENT OUTREACH (OUTPATIENT)
Dept: GASTROENTEROLOGY | Facility: CLINIC | Age: 53
End: 2023-06-02

## 2023-06-02 ENCOUNTER — OFFICE VISIT (OUTPATIENT)
Dept: INTERNAL MEDICINE | Facility: CLINIC | Age: 53
End: 2023-06-02
Payer: COMMERCIAL

## 2023-06-02 ENCOUNTER — TELEPHONE (OUTPATIENT)
Dept: INTERNAL MEDICINE | Facility: CLINIC | Age: 53
End: 2023-06-02

## 2023-06-02 ENCOUNTER — PATIENT OUTREACH (OUTPATIENT)
Dept: CARE COORDINATION | Facility: CLINIC | Age: 53
End: 2023-06-02

## 2023-06-02 VITALS
OXYGEN SATURATION: 98 % | BODY MASS INDEX: 25.79 KG/M2 | HEART RATE: 68 BPM | SYSTOLIC BLOOD PRESSURE: 118 MMHG | DIASTOLIC BLOOD PRESSURE: 76 MMHG | WEIGHT: 174.1 LBS | HEIGHT: 69 IN | RESPIRATION RATE: 18 BRPM | TEMPERATURE: 97.8 F

## 2023-06-02 DIAGNOSIS — R06.02 SOB (SHORTNESS OF BREATH): Primary | ICD-10-CM

## 2023-06-02 DIAGNOSIS — S01.311D COMPLEX LACERATION OF RIGHT EAR, SUBSEQUENT ENCOUNTER: ICD-10-CM

## 2023-06-02 DIAGNOSIS — R00.2 PALPITATIONS: ICD-10-CM

## 2023-06-02 PROCEDURE — 99214 OFFICE O/P EST MOD 30 MIN: CPT | Performed by: INTERNAL MEDICINE

## 2023-06-02 RX ORDER — ALBUTEROL SULFATE 90 UG/1
2 AEROSOL, METERED RESPIRATORY (INHALATION) EVERY 6 HOURS PRN
Qty: 18 G | Refills: 1 | Status: SHIPPED | OUTPATIENT
Start: 2023-06-02

## 2023-06-02 ASSESSMENT — PAIN SCALES - GENERAL: PAINLEVEL: MILD PAIN (3)

## 2023-06-02 NOTE — PATIENT INSTRUCTIONS
Consider blood thinners Xarelto or Eliquis instead of shots.  Could do coumadin but then needs testing.

## 2023-06-02 NOTE — PROGRESS NOTES
"  Assessment & Plan     SOB (shortness of breath)  Shortness of breath seems okay, better now he does have albuterol.  He is warned that the albuterol may give him some palpitations.  - albuterol (VENTOLIN HFA) 108 (90 Base) MCG/ACT inhaler; Inhale 2 puffs into the lungs every 6 hours as needed    Palpitations  Palpitations of the heart could be coming on with his Adderall but that is quite consistent.  Seems to be more with his smoking, caffeine or albuterol use.  Make sure he gets his TPN so his electrolytes are stable.  Consider heart monitor in the future.    Complex laceration of right ear, subsequent encounter  Complex laceration of the right ear is more of a scratch on the inner ear but complicated by her his Lovenox use.  It is continuing to bleed despite the Gelfoam from the emergency room.  Today I did use silver nitrate to cauterize the area and the bleeding was completely stopped in the ear canal prior to him leaving.             MED REC REQUIRED  Post Medication Reconciliation Status:   BMI:   Estimated body mass index is 25.71 kg/m  as calculated from the following:    Height as of this encounter: 1.753 m (5' 9\").    Weight as of this encounter: 79 kg (174 lb 1.6 oz).           Chuy Isaac MD  [facility] is a 52 year old, presenting for the following health issues:  ER F/U (Recheck right ear) and Results (Ultrasound results)        6/2/2023     9:09 AM   Additional Questions   Roomed by Jennie AUSTIN   Accompanied by Rose, wife     History of Present Illness       Reason for visit:  Blood clot and 3 month check up    He eats 0-1 servings of fruits and vegetables daily.He consumes 1 sweetened beverage(s) daily.He exercises with enough effort to increase his heart rate 9 or less minutes per day.  He exercises with enough effort to increase his heart rate 3 or less days per week.   He is taking medications regularly.       ED/UC Followup:    Facility:  [facility] Emergency room  Date of visit: 6-1-2023  Reason for visit: right ear bleeding  Current Status: no change    Right ear bleeding scratch and still bleeding some.    Taking enoxaparin shots.     ER for palpitations and nausea, some flutters. No chest pains.  Palpitations forever. ekg was ok, rate 66. Does smoke some and caffeine, will cut those back.     Anxiety as well.     Review of Systems       Objective    /76 (BP Location: Left arm, Patient Position: Chair)   Pulse 68   Temp 97.8  F (36.6  C) (Temporal)   Resp 18   Ht 1.753 m (5' 9\")   Wt 79 kg (174 lb 1.6 oz)   SpO2 98%   BMI 25.71 kg/m    Body mass index is 25.71 kg/m .  Physical Exam   Acute distress  Right ear has small scratch in the ear canal which is bleeding, this was controlled with silver nitrate cautery.  Eardrum appears okay.  Lungs are clear on examination  Heart is regular today                    "

## 2023-06-02 NOTE — PROGRESS NOTES
Clinic Care Coordination Contact    Situation: Patient chart reviewed by care coordinator.    Background: Patient is actively enrolled in care coordination.     Assessment: Patient was in the emergency room yesterday for a laceration of the ear. It was treated and he was discharged. Patient saw Primary Care Provider today.     Plan/Recommendations: RN CC will not reach out to patient since he saw Primary Care Provider today. RN CC will reach out at next scheduled outreach.     Kinsey Muhammad RN Care Coordination   [facility] Charlotte[facility]eyo Goldman  Email: [email or web address]  Phone: [phone]

## 2023-06-02 NOTE — TELEPHONE ENCOUNTER
"Response from Dr. Estrada:  Jimbo Estrada MD Eisenschenk, Kristen, RN; lAicia Dai RN    \"I recommend definitely using the G tube if he has it. If he feels nauseated we can send him zofran. He really just needs to do the FULL extended prep in the recommended time frame\"    -------------------------------------------------------    Attempted to call wife Rose to discuss recommendation above but no answer. Left message to return call [phone] #[phone]    Bowel prep recommendation of Golytely extended w/BID miralax 7 days prior (per notes below)    Upon review Golytely extended prep may need to be reordered and also zofran script if patient is agreeable.     Jyothi Holloway, RN  Endoscopy Procedure Pre Assessment RN  "

## 2023-06-02 NOTE — TELEPHONE ENCOUNTER
Hi Dr. Estrada,     This patient is scheduled for a colonoscopy with you on 6/12. He has had x 2 previous colonoscopies that have been incomplete due to solid stool in the colon.     I spoke with the patients wife today to go through pre assessment and when we started talking about the prep instructions she said he did the full extended prep last time and had zero output. After discussing this with her further she informed me he started drinking the golytley 1 week prior to his procedure to make sure he could drink it all.     Patients wife states he gets most of his nutrition through TPN due to his extremely slow moving bowels. She said he doesn't drink that much during the day. We did discuss potentially using his g tube to administer the prep since he doesn't drink very fast, but she still feels that may not work because he will still feel so full.     What other options do we have for him?     Radha Gutierrez, CHRISTY   Endoscopy Procedure Pre Assessment RN      Dr Estrada's response   Other than that I do not know of an option unless he is admitted for an inpatient prep. And that is almost never covered by insurance.     He really just needs to do the FULL extended prep in the recommended time frame.    Left a voice mail message and also my chart message   Patient has failed in the past.

## 2023-06-02 NOTE — TELEPHONE ENCOUNTER
Medication reconciliation completed by RN. Form and chart forwarded to PCP for signatures.    Medications noted on [facility] that are not on our list for provider review.    Ella Hammond RN

## 2023-06-04 ENCOUNTER — HOSPITAL ENCOUNTER (EMERGENCY)
Facility: CLINIC | Age: 53
Discharge: HOME | End: 2023-06-07
Attending: PHYSICIAN ASSISTANT | Admitting: PHYSICIAN ASSISTANT
Payer: COMMERCIAL

## 2023-06-04 ENCOUNTER — APPOINTMENT (OUTPATIENT)
Dept: GENERAL RADIOLOGY | Facility: CLINIC | Age: 53
End: 2023-06-04
Attending: PHYSICIAN ASSISTANT
Payer: COMMERCIAL

## 2023-06-04 DIAGNOSIS — D69.6 THROMBOCYTOPENIA (H): ICD-10-CM

## 2023-06-04 DIAGNOSIS — R50.9 FEVER, UNKNOWN ORIGIN: ICD-10-CM

## 2023-06-04 DIAGNOSIS — E87.6 HYPOKALEMIA: ICD-10-CM

## 2023-06-04 DIAGNOSIS — Z78.9 ON TOTAL PARENTERAL NUTRITION: ICD-10-CM

## 2023-06-04 DIAGNOSIS — A41.9 SEPSIS (H): ICD-10-CM

## 2023-06-04 DIAGNOSIS — Z87.898 HISTORY OF BACTEREMIA: ICD-10-CM

## 2023-06-04 DIAGNOSIS — T80.211A BLOODSTREAM INFECTION ASSOCIATED WITH CENTRAL VENOUS CATHETER, INITIAL ENCOUNTER: ICD-10-CM

## 2023-06-04 DIAGNOSIS — R78.81 GRAM-NEGATIVE BACTEREMIA: Primary | ICD-10-CM

## 2023-06-04 LAB
ALBUMIN SERPL BCG-MCNC: 3.1 G/DL (ref 3.5–5.2)
ALP SERPL-CCNC: 74 U/L (ref 40–129)
ALT SERPL W P-5'-P-CCNC: 25 U/L (ref 10–50)
ANION GAP SERPL CALCULATED.3IONS-SCNC: 11 MMOL/L (ref 7–15)
AST SERPL W P-5'-P-CCNC: 28 U/L (ref 10–50)
BASOPHILS # BLD AUTO: 0 10E3/UL (ref 0–0.2)
BASOPHILS NFR BLD AUTO: 0 %
BILIRUB SERPL-MCNC: 0.3 MG/DL
BUN SERPL-MCNC: 10.5 MG/DL (ref 6–20)
CALCIUM SERPL-MCNC: 8.3 MG/DL (ref 8.6–10)
CHLORIDE SERPL-SCNC: 102 MMOL/L (ref 98–107)
CREAT SERPL-MCNC: 0.75 MG/DL (ref 0.67–1.17)
DEPRECATED HCO3 PLAS-SCNC: 24 MMOL/L (ref 22–29)
EOSINOPHIL # BLD AUTO: 0.1 10E3/UL (ref 0–0.7)
EOSINOPHIL NFR BLD AUTO: 1 %
ERYTHROCYTE [DISTWIDTH] IN BLOOD BY AUTOMATED COUNT: 15.9 % (ref 10–15)
GFR SERPL CREATININE-BSD FRML MDRD: >90 ML/MIN/1.73M2
GLUCOSE SERPL-MCNC: 103 MG/DL (ref 70–99)
HCT VFR BLD AUTO: 32.3 % (ref 40–53)
HGB BLD-MCNC: 10.5 G/DL (ref 13.3–17.7)
IMM GRANULOCYTES # BLD: 0 10E3/UL
IMM GRANULOCYTES NFR BLD: 0 %
LACTATE SERPL-SCNC: 1 MMOL/L (ref 0.7–2)
LYMPHOCYTES # BLD AUTO: 0.2 10E3/UL (ref 0.8–5.3)
LYMPHOCYTES NFR BLD AUTO: 2 %
MCH RBC QN AUTO: 30.6 PG (ref 26.5–33)
MCHC RBC AUTO-ENTMCNC: 32.5 G/DL (ref 31.5–36.5)
MCV RBC AUTO: 94 FL (ref 78–100)
MONOCYTES # BLD AUTO: 0.3 10E3/UL (ref 0–1.3)
MONOCYTES NFR BLD AUTO: 5 %
NEUTROPHILS # BLD AUTO: 6.4 10E3/UL (ref 1.6–8.3)
NEUTROPHILS NFR BLD AUTO: 92 %
NRBC # BLD AUTO: 0 10E3/UL
NRBC BLD AUTO-RTO: 0 /100
PLATELET # BLD AUTO: 91 10E3/UL (ref 150–450)
POTASSIUM SERPL-SCNC: 3.3 MMOL/L (ref 3.4–5.3)
PROCALCITONIN SERPL IA-MCNC: 16.09 NG/ML
PROT SERPL-MCNC: 5.7 G/DL (ref 6.4–8.3)
RBC # BLD AUTO: 3.43 10E6/UL (ref 4.4–5.9)
SODIUM SERPL-SCNC: 137 MMOL/L (ref 136–145)
WBC # BLD AUTO: 6.9 10E3/UL (ref 4–11)

## 2023-06-04 PROCEDURE — 87149 DNA/RNA DIRECT PROBE: CPT | Performed by: PHYSICIAN ASSISTANT

## 2023-06-04 PROCEDURE — 87077 CULTURE AEROBIC IDENTIFY: CPT | Performed by: PHYSICIAN ASSISTANT

## 2023-06-04 PROCEDURE — 96365 THER/PROPH/DIAG IV INF INIT: CPT | Mod: 59 | Performed by: PHYSICIAN ASSISTANT

## 2023-06-04 PROCEDURE — 36415 COLL VENOUS BLD VENIPUNCTURE: CPT | Performed by: PHYSICIAN ASSISTANT

## 2023-06-04 PROCEDURE — 80053 COMPREHEN METABOLIC PANEL: CPT | Performed by: PHYSICIAN ASSISTANT

## 2023-06-04 PROCEDURE — 99285 EMERGENCY DEPT VISIT HI MDM: CPT | Performed by: PHYSICIAN ASSISTANT

## 2023-06-04 PROCEDURE — 85025 COMPLETE CBC W/AUTO DIFF WBC: CPT | Performed by: PHYSICIAN ASSISTANT

## 2023-06-04 PROCEDURE — 96361 HYDRATE IV INFUSION ADD-ON: CPT | Performed by: PHYSICIAN ASSISTANT

## 2023-06-04 PROCEDURE — 250N000013 HC RX MED GY IP 250 OP 250 PS 637: Performed by: PHYSICIAN ASSISTANT

## 2023-06-04 PROCEDURE — 250N000011 HC RX IP 250 OP 636: Performed by: PHYSICIAN ASSISTANT

## 2023-06-04 PROCEDURE — 71046 X-RAY EXAM CHEST 2 VIEWS: CPT

## 2023-06-04 PROCEDURE — 96367 TX/PROPH/DG ADDL SEQ IV INF: CPT | Mod: 59 | Performed by: PHYSICIAN ASSISTANT

## 2023-06-04 PROCEDURE — 84145 PROCALCITONIN (PCT): CPT | Performed by: PHYSICIAN ASSISTANT

## 2023-06-04 PROCEDURE — 258N000003 HC RX IP 258 OP 636: Performed by: PHYSICIAN ASSISTANT

## 2023-06-04 PROCEDURE — 96366 THER/PROPH/DIAG IV INF ADDON: CPT | Performed by: PHYSICIAN ASSISTANT

## 2023-06-04 PROCEDURE — 96376 TX/PRO/DX INJ SAME DRUG ADON: CPT | Mod: 59 | Performed by: PHYSICIAN ASSISTANT

## 2023-06-04 PROCEDURE — 99285 EMERGENCY DEPT VISIT HI MDM: CPT | Mod: 25 | Performed by: PHYSICIAN ASSISTANT

## 2023-06-04 PROCEDURE — 96375 TX/PRO/DX INJ NEW DRUG ADDON: CPT | Mod: 59 | Performed by: PHYSICIAN ASSISTANT

## 2023-06-04 PROCEDURE — 96360 HYDRATION IV INFUSION INIT: CPT | Mod: 59 | Performed by: PHYSICIAN ASSISTANT

## 2023-06-04 PROCEDURE — 83605 ASSAY OF LACTIC ACID: CPT | Performed by: PHYSICIAN ASSISTANT

## 2023-06-04 RX ORDER — DIPHENHYDRAMINE HYDROCHLORIDE 50 MG/ML
25 INJECTION INTRAMUSCULAR; INTRAVENOUS ONCE
Status: COMPLETED | OUTPATIENT
Start: 2023-06-04 | End: 2023-06-04

## 2023-06-04 RX ORDER — ENOXAPARIN SODIUM 100 MG/ML
80 INJECTION SUBCUTANEOUS 2 TIMES DAILY
Status: DISCONTINUED | OUTPATIENT
Start: 2023-06-05 | End: 2023-06-06

## 2023-06-04 RX ORDER — DEXTROAMPHETAMINE SACCHARATE, AMPHETAMINE ASPARTATE, DEXTROAMPHETAMINE SULFATE AND AMPHETAMINE SULFATE 5; 5; 5; 5 MG/1; MG/1; MG/1; MG/1
20 TABLET ORAL DAILY
Status: DISCONTINUED | OUTPATIENT
Start: 2023-06-05 | End: 2023-06-07 | Stop reason: HOSPADM

## 2023-06-04 RX ORDER — CARVEDILOL 6.25 MG/1
12.5 TABLET ORAL 2 TIMES DAILY WITH MEALS
Status: DISCONTINUED | OUTPATIENT
Start: 2023-06-05 | End: 2023-06-07 | Stop reason: HOSPADM

## 2023-06-04 RX ORDER — LORAZEPAM 2 MG/ML
1 INJECTION INTRAMUSCULAR ONCE
Status: COMPLETED | OUTPATIENT
Start: 2023-06-04 | End: 2023-06-04

## 2023-06-04 RX ORDER — PANTOPRAZOLE SODIUM 40 MG/1
40 TABLET, DELAYED RELEASE ORAL DAILY
Status: DISCONTINUED | OUTPATIENT
Start: 2023-06-05 | End: 2023-06-07 | Stop reason: HOSPADM

## 2023-06-04 RX ORDER — CEFAZOLIN SODIUM 1 G/50ML
2000 SOLUTION INTRAVENOUS
Status: DISCONTINUED | OUTPATIENT
Start: 2023-06-04 | End: 2023-06-06

## 2023-06-04 RX ORDER — CEFEPIME HYDROCHLORIDE 2 G/1
2 INJECTION, POWDER, FOR SOLUTION INTRAVENOUS ONCE
Status: COMPLETED | OUTPATIENT
Start: 2023-06-04 | End: 2023-06-04

## 2023-06-04 RX ORDER — OXYCODONE HCL 5 MG/5 ML
5-10 SOLUTION, ORAL ORAL EVERY 4 HOURS PRN
Status: DISCONTINUED | OUTPATIENT
Start: 2023-06-04 | End: 2023-06-07 | Stop reason: HOSPADM

## 2023-06-04 RX ADMIN — CEFEPIME HYDROCHLORIDE 2 G: 2 INJECTION, POWDER, FOR SOLUTION INTRAVENOUS at 22:10

## 2023-06-04 RX ADMIN — Medication 1000 MG: at 20:47

## 2023-06-04 RX ADMIN — LORAZEPAM 1 MG: 2 INJECTION INTRAMUSCULAR; INTRAVENOUS at 23:57

## 2023-06-04 RX ADMIN — VANCOMYCIN HYDROCHLORIDE 2000 MG: 1 INJECTION, POWDER, LYOPHILIZED, FOR SOLUTION INTRAVENOUS at 22:55

## 2023-06-04 RX ADMIN — DIPHENHYDRAMINE HYDROCHLORIDE 25 MG: 50 INJECTION, SOLUTION INTRAMUSCULAR; INTRAVENOUS at 22:06

## 2023-06-04 RX ADMIN — SODIUM CHLORIDE 1000 ML: 9 INJECTION, SOLUTION INTRAVENOUS at 21:07

## 2023-06-04 ASSESSMENT — ACTIVITIES OF DAILY LIVING (ADL)
ADLS_ACUITY_SCORE: 35
ADLS_ACUITY_SCORE: 37

## 2023-06-05 ENCOUNTER — ANESTHESIA (OUTPATIENT)
Dept: EMERGENCY MEDICINE | Facility: CLINIC | Age: 53
End: 2023-06-05
Payer: COMMERCIAL

## 2023-06-05 ENCOUNTER — ANESTHESIA EVENT (OUTPATIENT)
Dept: EMERGENCY MEDICINE | Facility: CLINIC | Age: 53
End: 2023-06-05
Payer: COMMERCIAL

## 2023-06-05 ENCOUNTER — TELEPHONE (OUTPATIENT)
Dept: INTERNAL MEDICINE | Facility: CLINIC | Age: 53
End: 2023-06-05
Payer: COMMERCIAL

## 2023-06-05 ENCOUNTER — TELEPHONE (OUTPATIENT)
Dept: INFECTIOUS DISEASES | Facility: CLINIC | Age: 53
End: 2023-06-05
Payer: COMMERCIAL

## 2023-06-05 LAB
ACINETOBACTER SPECIES: NOT DETECTED
ALBUMIN UR-MCNC: 30 MG/DL
ANION GAP SERPL CALCULATED.3IONS-SCNC: 9 MMOL/L (ref 7–15)
APPEARANCE UR: ABNORMAL
BACTERIA #/AREA URNS HPF: ABNORMAL /HPF
BASOPHILS # BLD AUTO: 0 10E3/UL (ref 0–0.2)
BASOPHILS NFR BLD AUTO: 0 %
BILIRUB UR QL STRIP: NEGATIVE
BUN SERPL-MCNC: 10.7 MG/DL (ref 6–20)
CALCIUM SERPL-MCNC: 8.4 MG/DL (ref 8.6–10)
CHLORIDE SERPL-SCNC: 103 MMOL/L (ref 98–107)
CITROBACTER SPECIES: NOT DETECTED
COLOR UR AUTO: YELLOW
CREAT SERPL-MCNC: 0.74 MG/DL (ref 0.67–1.17)
CTX-M: NOT DETECTED
DEPRECATED HCO3 PLAS-SCNC: 25 MMOL/L (ref 22–29)
ENTEROBACTER SPECIES: NOT DETECTED
ENTEROCOCCUS FAECALIS: NOT DETECTED
ENTEROCOCCUS FAECIUM: NOT DETECTED
EOSINOPHIL # BLD AUTO: 0.2 10E3/UL (ref 0–0.7)
EOSINOPHIL NFR BLD AUTO: 2 %
ERYTHROCYTE [DISTWIDTH] IN BLOOD BY AUTOMATED COUNT: 16.3 % (ref 10–15)
ESCHERICHIA COLI: NOT DETECTED
GFR SERPL CREATININE-BSD FRML MDRD: >90 ML/MIN/1.73M2
GLUCOSE SERPL-MCNC: 99 MG/DL (ref 70–99)
GLUCOSE UR STRIP-MCNC: NEGATIVE MG/DL
HCT VFR BLD AUTO: 35.8 % (ref 40–53)
HGB BLD-MCNC: 11.4 G/DL (ref 13.3–17.7)
HGB UR QL STRIP: ABNORMAL
IMM GRANULOCYTES # BLD: 0 10E3/UL
IMM GRANULOCYTES NFR BLD: 0 %
IMP: NOT DETECTED
KETONES UR STRIP-MCNC: NEGATIVE MG/DL
KLEBSIELLA OXYTOCA: NOT DETECTED
KLEBSIELLA PNEUMONIAE: DETECTED
KPC: NOT DETECTED
LEUKOCYTE ESTERASE UR QL STRIP: NEGATIVE
LISTERIA SPECIES (DETECTED/NOT DETECTED): NOT DETECTED
LYMPHOCYTES # BLD AUTO: 1.1 10E3/UL (ref 0.8–5.3)
LYMPHOCYTES NFR BLD AUTO: 10 %
MCH RBC QN AUTO: 30.3 PG (ref 26.5–33)
MCHC RBC AUTO-ENTMCNC: 31.8 G/DL (ref 31.5–36.5)
MCV RBC AUTO: 95 FL (ref 78–100)
MONOCYTES # BLD AUTO: 1.6 10E3/UL (ref 0–1.3)
MONOCYTES NFR BLD AUTO: 14 %
MUCOUS THREADS #/AREA URNS LPF: PRESENT /LPF
NDM: NOT DETECTED
NEUTROPHILS # BLD AUTO: 7.9 10E3/UL (ref 1.6–8.3)
NEUTROPHILS NFR BLD AUTO: 74 %
NITRATE UR QL: NEGATIVE
NRBC # BLD AUTO: 0 10E3/UL
NRBC BLD AUTO-RTO: 0 /100
OXA (DETECTED/NOT DETECTED): NOT DETECTED
PH UR STRIP: 5 [PH] (ref 5–7)
PLATELET # BLD AUTO: 111 10E3/UL (ref 150–450)
POTASSIUM SERPL-SCNC: 3.8 MMOL/L (ref 3.4–5.3)
PROTEUS SPECIES: NOT DETECTED
PSEUDOMONAS AERUGINOSA: NOT DETECTED
RBC # BLD AUTO: 3.76 10E6/UL (ref 4.4–5.9)
RBC URINE: 41 /HPF
SODIUM SERPL-SCNC: 137 MMOL/L (ref 136–145)
SP GR UR STRIP: 1.02 (ref 1–1.03)
SQUAMOUS EPITHELIAL: <1 /HPF
STAPHYLOCOCCUS AUREUS: NOT DETECTED
STAPHYLOCOCCUS EPIDERMIDIS: NOT DETECTED
STAPHYLOCOCCUS LUGDUNENSIS: NOT DETECTED
STAPHYLOCOCCUS SPECIES: NOT DETECTED
STREPTOCOCCUS AGALACTIAE: NOT DETECTED
STREPTOCOCCUS ANGINOSUS GROUP: NOT DETECTED
STREPTOCOCCUS PNEUMONIAE: NOT DETECTED
STREPTOCOCCUS PYOGENES: NOT DETECTED
STREPTOCOCCUS SPECIES: NOT DETECTED
UROBILINOGEN UR STRIP-MCNC: NORMAL MG/DL
VIM: NOT DETECTED
WBC # BLD AUTO: 10.8 10E3/UL (ref 4–11)
WBC URINE: 4 /HPF

## 2023-06-05 PROCEDURE — 85025 COMPLETE CBC W/AUTO DIFF WBC: CPT | Performed by: FAMILY MEDICINE

## 2023-06-05 PROCEDURE — 250N000011 HC RX IP 250 OP 636

## 2023-06-05 PROCEDURE — 87086 URINE CULTURE/COLONY COUNT: CPT | Performed by: PHYSICIAN ASSISTANT

## 2023-06-05 PROCEDURE — 82374 ASSAY BLOOD CARBON DIOXIDE: CPT | Performed by: FAMILY MEDICINE

## 2023-06-05 PROCEDURE — 250N000011 HC RX IP 250 OP 636: Performed by: PHYSICIAN ASSISTANT

## 2023-06-05 PROCEDURE — 370N000003 HC ANESTHESIA WARD SERVICE: Performed by: NURSE ANESTHETIST, CERTIFIED REGISTERED

## 2023-06-05 PROCEDURE — 96375 TX/PRO/DX INJ NEW DRUG ADDON: CPT | Mod: 59 | Performed by: PHYSICIAN ASSISTANT

## 2023-06-05 PROCEDURE — 258N000003 HC RX IP 258 OP 636: Performed by: PHYSICIAN ASSISTANT

## 2023-06-05 PROCEDURE — 250N000013 HC RX MED GY IP 250 OP 250 PS 637: Performed by: FAMILY MEDICINE

## 2023-06-05 PROCEDURE — 82310 ASSAY OF CALCIUM: CPT | Performed by: FAMILY MEDICINE

## 2023-06-05 PROCEDURE — 36415 COLL VENOUS BLD VENIPUNCTURE: CPT | Performed by: FAMILY MEDICINE

## 2023-06-05 PROCEDURE — 250N000013 HC RX MED GY IP 250 OP 250 PS 637: Performed by: PHYSICIAN ASSISTANT

## 2023-06-05 PROCEDURE — 250N000011 HC RX IP 250 OP 636: Performed by: FAMILY MEDICINE

## 2023-06-05 PROCEDURE — 81001 URINALYSIS AUTO W/SCOPE: CPT | Performed by: PHYSICIAN ASSISTANT

## 2023-06-05 RX ORDER — LORAZEPAM 2 MG/ML
1 INJECTION INTRAMUSCULAR ONCE
Status: COMPLETED | OUTPATIENT
Start: 2023-06-05 | End: 2023-06-05

## 2023-06-05 RX ORDER — DIPHENHYDRAMINE HYDROCHLORIDE 50 MG/ML
25 INJECTION INTRAMUSCULAR; INTRAVENOUS ONCE
Status: COMPLETED | OUTPATIENT
Start: 2023-06-05 | End: 2023-06-05

## 2023-06-05 RX ORDER — NALOXONE HYDROCHLORIDE 0.4 MG/ML
0.4 INJECTION, SOLUTION INTRAMUSCULAR; INTRAVENOUS; SUBCUTANEOUS
Status: DISCONTINUED | OUTPATIENT
Start: 2023-06-05 | End: 2023-06-07 | Stop reason: HOSPADM

## 2023-06-05 RX ORDER — DIPHENHYDRAMINE HYDROCHLORIDE 50 MG/ML
INJECTION INTRAMUSCULAR; INTRAVENOUS
Status: COMPLETED
Start: 2023-06-05 | End: 2023-06-05

## 2023-06-05 RX ORDER — SUCRALFATE ORAL 1 G/10ML
1 SUSPENSION ORAL
Status: DISCONTINUED | OUTPATIENT
Start: 2023-06-05 | End: 2023-06-05

## 2023-06-05 RX ORDER — NALOXONE HYDROCHLORIDE 0.4 MG/ML
0.2 INJECTION, SOLUTION INTRAMUSCULAR; INTRAVENOUS; SUBCUTANEOUS
Status: DISCONTINUED | OUTPATIENT
Start: 2023-06-05 | End: 2023-06-07 | Stop reason: HOSPADM

## 2023-06-05 RX ORDER — CEFEPIME HYDROCHLORIDE 2 G/1
2 INJECTION, POWDER, FOR SOLUTION INTRAVENOUS EVERY 8 HOURS SCHEDULED
Status: DISCONTINUED | OUTPATIENT
Start: 2023-06-05 | End: 2023-06-05

## 2023-06-05 RX ORDER — NYSTATIN 100000 U/G
CREAM TOPICAL 2 TIMES DAILY
Status: DISCONTINUED | OUTPATIENT
Start: 2023-06-05 | End: 2023-06-07 | Stop reason: HOSPADM

## 2023-06-05 RX ORDER — CEFTRIAXONE 2 G/1
2 INJECTION, POWDER, FOR SOLUTION INTRAMUSCULAR; INTRAVENOUS EVERY 24 HOURS
Status: DISCONTINUED | OUTPATIENT
Start: 2023-06-05 | End: 2023-06-07 | Stop reason: HOSPADM

## 2023-06-05 RX ORDER — SUCRALFATE ORAL 1 G/10ML
1 SUSPENSION ORAL 4 TIMES DAILY PRN
Status: DISCONTINUED | OUTPATIENT
Start: 2023-06-05 | End: 2023-06-07 | Stop reason: HOSPADM

## 2023-06-05 RX ADMIN — VANCOMYCIN HYDROCHLORIDE 2000 MG: 1 INJECTION, POWDER, LYOPHILIZED, FOR SOLUTION INTRAVENOUS at 16:17

## 2023-06-05 RX ADMIN — LORAZEPAM 1 MG: 2 INJECTION INTRAMUSCULAR; INTRAVENOUS at 22:31

## 2023-06-05 RX ADMIN — OXYCODONE HYDROCHLORIDE 10 MG: 5 SOLUTION ORAL at 22:31

## 2023-06-05 RX ADMIN — CEFTRIAXONE SODIUM 2 G: 2 INJECTION, POWDER, FOR SOLUTION INTRAMUSCULAR; INTRAVENOUS at 14:01

## 2023-06-05 RX ADMIN — OXYCODONE HYDROCHLORIDE 10 MG: 5 SOLUTION ORAL at 01:05

## 2023-06-05 RX ADMIN — CEFEPIME HYDROCHLORIDE 2 G: 2 INJECTION, POWDER, FOR SOLUTION INTRAVENOUS at 08:16

## 2023-06-05 RX ADMIN — DIPHENHYDRAMINE HYDROCHLORIDE 25 MG: 50 INJECTION INTRAMUSCULAR; INTRAVENOUS at 16:14

## 2023-06-05 RX ADMIN — SUCRALFATE 1 G: 1 SUSPENSION ORAL at 03:51

## 2023-06-05 RX ADMIN — NYSTATIN: 100000 CREAM TOPICAL at 22:32

## 2023-06-05 RX ADMIN — LORAZEPAM 1 MG: 2 INJECTION INTRAMUSCULAR; INTRAVENOUS at 03:51

## 2023-06-05 RX ADMIN — OXYCODONE HYDROCHLORIDE 10 MG: 5 SOLUTION ORAL at 17:22

## 2023-06-05 RX ADMIN — CARVEDILOL 12.5 MG: 6.25 TABLET, FILM COATED ORAL at 08:17

## 2023-06-05 RX ADMIN — DIPHENHYDRAMINE HYDROCHLORIDE 25 MG: 50 INJECTION, SOLUTION INTRAMUSCULAR; INTRAVENOUS at 16:14

## 2023-06-05 RX ADMIN — OXYCODONE HYDROCHLORIDE 10 MG: 5 SOLUTION ORAL at 05:30

## 2023-06-05 RX ADMIN — CARVEDILOL 12.5 MG: 6.25 TABLET, FILM COATED ORAL at 18:31

## 2023-06-05 RX ADMIN — SUCRALFATE 1 G: 1 SUSPENSION ORAL at 19:33

## 2023-06-05 ASSESSMENT — ACTIVITIES OF DAILY LIVING (ADL)
ADLS_ACUITY_SCORE: 37

## 2023-06-05 NOTE — ED NOTES
Patient states the extension tubing for G tube fell off, does not have an extra one on hand. Advised department does not have one here that can be used to replace it. Able to flush and lock G tube. Offered to have provider look at it and assist, patient declines at this time.

## 2023-06-05 NOTE — ANESTHESIA CARE TRANSFER NOTE
Patient: Parker Acevedo    Procedure: * No procedures listed *       Diagnosis: * No pre-op diagnosis entered *  Diagnosis Additional Information: No value filed.    Anesthesia Type:   No value filed.     Note:    Oropharynx: oropharynx clear of all foreign objects and spontaneously breathing  Level of Consciousness: awake      Independent Airway: airway patency satisfactory and stable  Dentition: dentition unchanged  Vital Signs Stable: post-procedure vital signs reviewed and stable  Report to RN Given: handoff report given  Patient transferred to: Emergency Department    Handoff Report: Identifed the Patient, Identified the Reponsible Provider, Reviewed the pertinent medical history, Discussed the surgical course, Reviewed Intra-OP anesthesia mangement and issues during anesthesia, Set expectations for post-procedure period and Allowed opportunity for questions and acknowledgement of understanding      Vitals:  Vitals Value Taken Time   BP     Temp     Pulse     Resp     SpO2         Electronically Signed By: SAILAJA Sherwood CRNA  June 5, 2023  4:05 PM

## 2023-06-05 NOTE — DISCHARGE INSTRUCTIONS
Go to the [facility] waiting room at the [facility].  Need to arrive before 1230.  Nothing to eat or drink between now and the procedure.  We will be seeing interventional radiology to have the catheter removed and a new PICC line placed.  Is important that you follow-up with your home care team to continue outpatient IV antibiotics for this infection.  Please contact them.    Need continue vancomycin 2000 mg IV every 12 hours for 7 days and Rocephin 2000 mg every 24 hours x7 days.  Continue through Home caring.

## 2023-06-05 NOTE — TELEPHONE ENCOUNTER
FYI: Homecare RN wanting to give update; Patient currently hospitalized with sepsis.  She states last week had attempted to see patient to change Cm dressing.  He always had reason why she couldn't schedule appointment and when had appointment RN would drive to his home and no one would answer door.  She states week before was the same thing;  She did manage to change it on Friday, May 26.  Last date she saw him.   Fabiana MORRISON RN

## 2023-06-05 NOTE — PHARMACY-VANCOMYCIN DOSING SERVICE
Pharmacy Vancomycin Initial Note  Date of Service 2023  Patient's  1970  52 year old, male    Indication: Sepsis    Current estimated CrCl = Estimated Creatinine Clearance: 145.2 mL/min (based on SCr of 0.75 mg/dL).    Creatinine for last 3 days  2023:  8:47 PM Creatinine 0.75 mg/dL    Recent Vancomycin Level(s) for last 3 days  No results found for requested labs within last 3 days.      Vancomycin IV Administrations (past 72 hours)      No vancomycin orders with administrations in past 72 hours.                Nephrotoxins and other renal medications (From now, onward)    Start     Dose/Rate Route Frequency Ordered Stop    23 214  vancomycin (VANCOCIN) 2,000 mg in sodium chloride 0.9 % 500 mL intermittent infusion         2,000 mg  over 2 Hours Intravenous EVERY 18 HOURS 23            Contrast Orders - past 72 hours (72h ago, onward)    None          [employer] Prediction of Planned Initial Vancomycin Regimen  Loading dose: N/A  Regimen: 2000 mg IV every 18 hours.  Start time: 21:39 on 2023  Exposure target: AUC24 (range)400-600 mg/L.hr   AUC24,ss: 487 mg/L.hr  Probability of AUC24 > 400: 71 %  Ctrough,ss: 12.2 mg/L  Probability of Ctrough,ss > 20: 17 %  Probability of nephrotoxicity (Lodise CARMELA ): 7 %          Plan:  1. Start vancomycin  2000 mg IV q18h.   2. Vancomycin monitoring method: AUC  3. Vancomycin therapeutic monitoring goal: 400-600 mg*h/L  4. Pharmacy will check vancomycin levels as appropriate in 1-3 Days.    5. Serum creatinine levels will be ordered daily for the first week of therapy and at least twice weekly for subsequent weeks.      Eduardo Galeano, PharmD

## 2023-06-05 NOTE — ED PROVIDER NOTES
Transfer of Care Note  This patient was signed out to me and I assumed care from Dr. Chambers at change of shift.  Parker Acevedo is a 52 year old male who presented to the emergency department with a chief complaint of Fever  .  Please see the original providers history and physical for complete details.    The following issues were signed out to me to follow up on:  Disposition      Pertant Lab/Imaging Findings During this Visit was     Results for orders placed or performed during the hospital encounter of 06/04/23 (from the past 24 hour(s))   CBC with platelets differential    Narrative    The following orders were created for panel order CBC with platelets differential.  Procedure                               Abnormality         Status                     ---------                               -----------         ------                     CBC with platelets and d...[[id number]]  Abnormal            Final result                 Please view results for these tests on the individual orders.   Comprehensive metabolic panel   Result Value Ref Range    Sodium 137 136 - 145 mmol/L    Potassium 3.3 (L) 3.4 - 5.3 mmol/L    Chloride 102 98 - 107 mmol/L    Carbon Dioxide (CO2) 24 22 - 29 mmol/L    Anion Gap 11 7 - 15 mmol/L    Urea Nitrogen 10.5 6.0 - 20.0 mg/dL    Creatinine 0.75 0.67 - 1.17 mg/dL    Calcium 8.3 (L) 8.6 - 10.0 mg/dL    Glucose 103 (H) 70 - 99 mg/dL    Alkaline Phosphatase 74 40 - 129 U/L    AST 28 10 - 50 U/L    ALT 25 10 - 50 U/L    Protein Total 5.7 (L) 6.4 - 8.3 g/dL    Albumin 3.1 (L) 3.5 - 5.2 g/dL    Bilirubin Total 0.3 <=1.2 mg/dL    GFR Estimate >90 >60 mL/min/1.73m2   Lactic acid whole blood   Result Value Ref Range    Lactic Acid 1.0 0.7 - 2.0 mmol/L   Procalcitonin   Result Value Ref Range    Procalcitonin 16.09 (HH) <0.05 ng/mL   Blood Culture Peripheral Blood    Specimen: Peripheral Blood   Result Value Ref Range    Culture Positive on the 1st day of incubation (A)     Culture Gram  negative bacilli (AA)     Culture Gram positive cocci in pairs and chains (AA)    CBC with platelets and differential   Result Value Ref Range    WBC Count 6.9 4.0 - 11.0 10e3/uL    RBC Count 3.43 (L) 4.40 - 5.90 10e6/uL    Hemoglobin 10.5 (L) 13.3 - 17.7 g/dL    Hematocrit 32.3 (L) 40.0 - 53.0 %    MCV 94 78 - 100 fL    MCH 30.6 26.5 - 33.0 pg    MCHC 32.5 31.5 - 36.5 g/dL    RDW 15.9 (H) 10.0 - 15.0 %    Platelet Count 91 (L) 150 - 450 10e3/uL    % Neutrophils 92 %    % Lymphocytes 2 %    % Monocytes 5 %    % Eosinophils 1 %    % Basophils 0 %    % Immature Granulocytes 0 %    NRBCs per 100 WBC 0 <1 /100    Absolute Neutrophils 6.4 1.6 - 8.3 10e3/uL    Absolute Lymphocytes 0.2 (L) 0.8 - 5.3 10e3/uL    Absolute Monocytes 0.3 0.0 - 1.3 10e3/uL    Absolute Eosinophils 0.1 0.0 - 0.7 10e3/uL    Absolute Basophils 0.0 0.0 - 0.2 10e3/uL    Absolute Immature Granulocytes 0.0 <=0.4 10e3/uL    Absolute NRBCs 0.0 10e3/uL   XR Chest 2 Views    Narrative    EXAM: XR CHEST 2 VIEWS  LOCATION: [facility]  DATE/TIME: 6/4/2023 9:25 PM CDT    INDICATION: Fever  COMPARISON: 01/21/2023      Impression    IMPRESSION: No obvious focal pneumonia. No pleural effusion. Normal heart size. Stable left central catheter tip at the distal SVC.       CBC with platelets differential    Narrative    The following orders were created for panel order CBC with platelets differential.  Procedure                               Abnormality         Status                     ---------                               -----------         ------                     CBC with platelets and d...[[id number]]  Abnormal            Final result                 Please view results for these tests on the individual orders.   Basic metabolic panel   Result Value Ref Range    Sodium 137 136 - 145 mmol/L    Potassium 3.8 3.4 - 5.3 mmol/L    Chloride 103 98 - 107 mmol/L    Carbon Dioxide (CO2) 25 22 - 29 mmol/L    Anion Gap 9 7 - 15 mmol/L     Urea Nitrogen 10.7 6.0 - 20.0 mg/dL    Creatinine 0.74 0.67 - 1.17 mg/dL    Calcium 8.4 (L) 8.6 - 10.0 mg/dL    Glucose 99 70 - 99 mg/dL    GFR Estimate >90 >60 mL/min/1.73m2   CBC with platelets and differential   Result Value Ref Range    WBC Count 10.8 4.0 - 11.0 10e3/uL    RBC Count 3.76 (L) 4.40 - 5.90 10e6/uL    Hemoglobin 11.4 (L) 13.3 - 17.7 g/dL    Hematocrit 35.8 (L) 40.0 - 53.0 %    MCV 95 78 - 100 fL    MCH 30.3 26.5 - 33.0 pg    MCHC 31.8 31.5 - 36.5 g/dL    RDW 16.3 (H) 10.0 - 15.0 %    Platelet Count 111 (L) 150 - 450 10e3/uL    % Neutrophils 74 %    % Lymphocytes 10 %    % Monocytes 14 %    % Eosinophils 2 %    % Basophils 0 %    % Immature Granulocytes 0 %    NRBCs per 100 WBC 0 <1 /100    Absolute Neutrophils 7.9 1.6 - 8.3 10e3/uL    Absolute Lymphocytes 1.1 0.8 - 5.3 10e3/uL    Absolute Monocytes 1.6 (H) 0.0 - 1.3 10e3/uL    Absolute Eosinophils 0.2 0.0 - 0.7 10e3/uL    Absolute Basophils 0.0 0.0 - 0.2 10e3/uL    Absolute Immature Granulocytes 0.0 <=0.4 10e3/uL    Absolute NRBCs 0.0 10e3/uL   UA with Microscopic   Result Value Ref Range    Color Urine Yellow Colorless, Straw, Light Yellow, Yellow    Appearance Urine Cloudy (A) Clear    Glucose Urine Negative Negative mg/dL    Bilirubin Urine Negative Negative    Ketones Urine Negative Negative mg/dL    Specific Gravity Urine 1.020 1.003 - 1.035    Blood Urine Large (A) Negative    pH Urine 5.0 5.0 - 7.0    Protein Albumin Urine 30 (A) Negative mg/dL    Urobilinogen Urine Normal Normal, 2.0 mg/dL    Nitrite Urine Negative Negative    Leukocyte Esterase Urine Negative Negative    Bacteria Urine Few (A) None Seen /HPF    Mucus Urine Present (A) None Seen /LPF    RBC Urine 41 (H) <=2 /HPF    WBC Urine 4 <=5 /HPF    Squamous Epithelials Urine <1 <=1 /HPF     *Note: Due to a large number of results and/or encounters for the requested time period, some results have not been displayed. A complete set of results can be found in Results Review.         My  focused follow up physical exam shows:   Vitals:  B/P: 115/80, T: 97.8, P: 78, R: 18  Gen:  Pt appears stable and no apparent distress    Procedure note:  [facility] Procedure Note      Limited Bedside ED Ultrasound for Peripheral Vein Cannulation:     PROCEDURE: PERFORMED BY: Dr. Oseas Delgado MD  INDICATIONS/SYMPTOM:  Difficult peripheral intravenous access  PATIENT: Parker Acevedo MRN: [id number]   DATE: 23 TIME: 3pm  PROBE: High frequency linear probe  BODY LOCATION: The ultrasound was performed on the left antecubital fossa.  FINDINGS: Artery and vein visualized.  Vein completely compressible (ie collapsible) and no thrombus visualized.  INTERPRETATION: Patent vein confirmed with US.  Peripheral line inserted into vein utilizing real-time ultrasonic guidance.   IMAGE DOCUMENTATION: Images were archived to hard drive.          ED Course & Medical Decision Makin:30 AM: I did [facility] Dr. Puga, who will put the patient on the wait list there.  One of the patient's blood cultures did come back positive.  We discussed current antibiotic coverage and recommend continuing on the cefepime and vancomycin for now.  Patient remained stable.  3 PM: Nursing asked me to put a ultrasound-guided IV in which was done as noted above.  Unfortunately about 40 minutes later the IV infiltrated and blue.  Patient is now stating that he wants to leave AGAINST MEDICAL ADVICE.  Had a long discussion with the patient about not leaving and how worried we are with what is growing in his blood.  Patient states he understands this but still wants to just leave.  I told him if he changes his mind about hospitalization he should come back here or go down to the [place] but get seen somewhere as soon as possible.  I explained to him how gravely ill he can be including death and he understood.  Patient has mental capacity to make these decisions and will  sign out AMA at this time.  5 PM: After patient was ready to leave, family was able to talk him into decided to stay.  We had anesthesia, and place another IV and the patient.  Patient is going to stay for the moment.  Patient will be signed out at change of shift while we are still waiting for bed placement at the [place].  There has been no change when I called to get an update here recently.         Impression and Disposition:  Bacteremia           This note was completed in part using Dragon voice recognition, and may contain word and grammatical errors.        Oseas Delgado MD  06/05/23 [id number]       Oseas Delgado MD  06/05/23 [id number]

## 2023-06-05 NOTE — TELEPHONE ENCOUNTER
Per chart review, patient is waiting for a bed at [facility]. As patient is being transferred for line infection, ID will likely be consulted.

## 2023-06-05 NOTE — MEDICATION SCRIBE - ADMISSION MEDICATION HISTORY
Medication Scribe Admission Medication History    Admission medication history is complete. The information provided in this note is only as accurate as the sources available at the time of the update.    Medication reconciliation/reorder completed by provider prior to medication history? Yes, some of them.    Information Source(s): Spouse via patient's cell phone    Pertinent Information: Patient takes Zofran at 11 AM every morning.  Taking Oxy every 6 hours while awake.  Gets a Liter of Hydration daily at noon.    Changes made to PTA medication list:    Added: None    Deleted: MVI as getting IV form in TPN.    Changed: None    Medication Affordability:  Not including over the counter (OTC) medications, was there a time in the past 3 months when you did not take your medications as prescribed because of cost?: No    Allergies reviewed with patient and updates made in EHR: yes, no changes.    Medication History Completed By: Miguelina Giles 6/5/2023 9:33 AM    Prior to Admission medications    Medication Sig Last Dose Taking? Auth Provider Long Term End Date   albuterol (VENTOLIN HFA) 108 (90 Base) MCG/ACT inhaler Inhale 2 puffs into the lungs every 6 hours as needed Past Week at unknown Yes Chuy Isaac MD Yes    amphetamine-dextroamphetamine (ADDERALL) 20 MG tablet TAKE ONE TABLET BY MOUTH ONCE DAILY 6/4/2023 at am Yes Chuy Isaac MD No    carvedilol (COREG) 6.25 MG tablet TAKE 2 TABLETS (12.5 MG) BY MOUTH 2 TIMES DAILY (WITH MEALS) 6/4/2023 at am Yes Chuy Isaac MD Yes    cyanocobalamin (CYANOCOBALAMIN) 1000 MCG/ML injection INJECT 1 ML INTO THE MUSCLE EVERY 30 DAYS 5/12/2023 at unknown Yes Chuy Isaac MD Yes    enoxaparin ANTICOAGULANT (LOVENOX) 80 MG/0.8ML syringe INJECT THE CONTENTS OF ONE SYRINGE (80MG) UNDER THE SKIN TWO TIMES A DAY 6/4/2023 at am Yes Chuy Isaac MD     [facility] INFUSION MANAGED PATIENT Contact [facility] for patient specific medication information at  "[phone] on admission and discharge from the hospital.  Phones are answered 24 hours a day 7 days a week 365 days a year.    Providers - Choose \"CONTINUE HOME MED (no script)\" at discharge if patient treatment with home infusion will continue. 6/4/2023 at unknown Yes Ilsa Shine PA     fentaNYL (DURAGESIC) 25 mcg/hr 72 hr patch Place 1 patch onto the skin every 48 hours remove old patch. Fill 05/31/23 and start 06/02/23. 30 days supply 6/3/2023 at 1000 Yes Natalie Hull APRN CNP     lidocaine (LIDODERM) 5 % patch Place 1-2 patches onto the skin every 24 hours Wear for 12 hours, remove for 12 hours.  OK to cut to better fit to size. More than a month at unknown Yes Natalie Hull APRN CNP     LORazepam (ATIVAN) 1 MG tablet TAKE ONE TABLET BY MOUTH ONCE DAILY AS NEEDED FOR ANXIETY WITH TPN AND MEDICATIONS.  Patient taking differently: Take 1 mg by mouth every evening TAKE ONE TABLET BY MOUTH ONCE DAILY AS NEEDED FOR ANXIETY WITH TPN AND MEDICATIONS 6/3/2023 at pm Yes Chuy Isaac MD     naloxone (NARCAN) 4 MG/0.1ML nasal spray Spray 1 spray (4 mg) into one nostril alternating nostrils once as needed for opioid reversal every 2-3 minutes until assistance arrives never used at on hand Yes Natalie Hull APRN CNP Yes    nystatin (MYCOSTATIN) 215125 UNIT/GM external cream APPLY TOPICALLY 2 TIMES DAILY 6/3/2023 at am Yes Carlos Doherty,      ondansetron (ZOFRAN ODT) 8 MG ODT tab DISSOLVE ONE TABLET ON TONGUE EVERY 8 HOURS AS NEEDED FOR NAUSEA 6/4/2023 at am Yes Chuy Isaac MD No    oxyCODONE (ROXICODONE) 5 MG/5ML solution Take 5-10 mLs (5-10 mg) by mouth every 4 hours as needed for moderate to severe pain Max of 40mg/day. Fill 5/31/2023 and start 6/2/2023. 30 day supply 6/4/2023 at afternoon, threw it up Yes Natalie Hull APRN CNP     pantoprazole (PROTONIX) 40 MG EC tablet Take 1 tablet (40 mg) by mouth daily 6/4/2023 at am Yes Chuy Isaac MD   " "  polyethylene glycol (MIRALAX) 17 GM/Dose powder Take 17 g by mouth daily  Patient taking differently: Take 17 g by mouth daily as needed Past Week at unknown Yes Natalie Hull APRN CNP No    sucralfate (CARAFATE) 1 GM/10ML suspension TAKE 10MLS  BY MOUTH FOUR TIMES A DAY AS NEEDED 6/4/2023 at afternoon Yes Chuy Isaac MD     Syringe/Needle, Disp, (B-D ECLIPSE SYRINGE) 27G X 1/2\" 1 ML MISC 1 Device every 30 days 5/12/2023 at unknown Yes Chuy Isaac MD     vitamin D2 (ERGOCALCIFEROL) 79597 units (1250 mcg) capsule Take 1 capsule (50,000 Units) by mouth once a week 6/2/2023 at 1000 Yes Chuy Isaac MD     EPINEPHrine (ANY BX GENERIC EQUIV) 0.3 MG/0.3ML injection 2-pack  never used at for [place] infusion  Reported, Patient     NO ACTIVE MEDICATIONS .   Rupesh Rothman MD Yes 10/10/08       "

## 2023-06-05 NOTE — TELEPHONE ENCOUNTER
[facility] Call Center    Phone Message    May a detailed message be left on voicemail: yes     Reason for Call: Other: patient's wife Rose calling with concerns that patient is currently inpatient at [facility] and they are discussing discharging him and sending him home with a current infection, please advise      Action Taken: Other: ID    Travel Screening: Not Applicable

## 2023-06-05 NOTE — TELEPHONE ENCOUNTER
Per chart review, patient currently in  ED pending admission for sepsis. Second pre assessment call not completed.     Beth Angeles RN

## 2023-06-05 NOTE — ANESTHESIA PREPROCEDURE EVALUATION
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     Complication of anesthesia      Difficulty swallowing      Gastric ulcer, unspecified as acute or chronic, without mention of hemorrhage, perforation, or obstruction      Gastro-oesophageal reflux disease      Head injury      Hiatal hernia      Other bladder disorder      Other chronic pain      PONV (postoperative nausea and vomiting)      Severe malnutrition (H)     TPN     Short gut syndrome      Tobacco abuse       Past Surgical History:   Procedure Laterality Date     AMPUTATION       APPENDECTOMY       BACK SURGERY  11/3/2014    curve in the spine     BIOPSY LYMPH NODE CERVICAL N/A 2015    Procedure: BIOPSY LYMPH NODE CERVICAL;  Surgeon: Baron Scanlon MD;  [facility]     CHOLECYSTECTOMY       COLONOSCOPY N/A 2021    Procedure: COLONOSCOPY;  Surgeon: Jimbo Estrada MD;  Location: [facility] OR     COLONOSCOPY N/A 2022    Procedure: COLONOSCOPY;  Surgeon: Jimbo Estrada MD;  Location: [facility] OR     DISCECTOMY, FUSION CERVICAL ANTERIOR ONE LEVEL, COMBINED N/A 2/15/2017    Procedure: COMBINED DISCECTOMY, FUSION CERVICAL ANTERIOR ONE LEVEL;  Surgeon: Darren Campos MD;  [facility]     ENDOSCOPIC INSERTION TUBE GASTROSTOMY  2013    Procedure: ENDOSCOPIC INSERTION TUBE GASTROSTOMY;;  Surgeon: Francis Vyas MD;  [facility]     ENDOSCOPIC ULTRASOUND UPPER GASTROINTESTINAL TRACT (GI)  2011    Procedure:ENDOSCOPIC ULTRASOUND UPPER GASTROINTESTINAL TRACT (GI); Both Procedures done Conjointly;  Surgeon:NEREIDA HOUSER; [facility]     ENDOSCOPIC ULTRASOUND UPPER GASTROINTESTINAL TRACT (GI)  9/9/2013    Procedure: ENDOSCOPIC ULTRASOUND UPPER GASTROINTESTINAL TRACT (GI);  Endoscopic Ultrasound Guide Gastrostomy Tube Placement  C-arm;  Surgeon: Noe Lizarraga MD;  [facility]     ENDOSCOPIC ULTRASOUND UPPER GASTROINTESTINAL TRACT (GI) N/A 2/24/2021    Procedure: ENDOSCOPIC ULTRASOUND, ESOPHAGOSCOPY / UPPER GASTROINTESTINAL TRACT (GI), esophagastrogastroduodenoscopy;  Surgeon: Berny Bach MD;  [facility]: [facility]     ENDOSCOPY  03/25/11    EGD, [facility]     ENDOSCOPY  08/04/09    Upper Endoscopy, [facility]     ENDOSCOPY  01/05/09    Upper Endoscopy, [facility]     ESOPHAGOSCOPY, GASTROSCOPY, DUODENOSCOPY (EGD), COMBINED  4/20/2011    Procedure:COMBINED ESOPHAGOSCOPY, GASTROSCOPY, DUODENOSCOPY (EGD); Surgeon:BLU VYAS; [facility]     ESOPHAGOSCOPY, GASTROSCOPY, DUODENOSCOPY (EGD), COMBINED  6/15/2011    Procedure:COMBINED ESOPHAGOSCOPY, GASTROSCOPY, DUODENOSCOPY (EGD); Surgeon:BLU VYAS; [facility]     ESOPHAGOSCOPY, GASTROSCOPY, DUODENOSCOPY (EGD), COMBINED  6/12/2013    Procedure: COMBINED ESOPHAGOSCOPY, GASTROSCOPY, DUODENOSCOPY (EGD);;  Surgeon: Blu Vyas MD;  [facility]     ESOPHAGOSCOPY, GASTROSCOPY, DUODENOSCOPY (EGD), COMBINED  11/22/2013    Procedure: COMBINED ESOPHAGOSCOPY, GASTROSCOPY, DUODENOSCOPY (EGD);;  Surgeon: Blu Vyas MD;  [facility]     ESOPHAGOSCOPY, GASTROSCOPY, DUODENOSCOPY (EGD), COMBINED  4/30/2014    Procedure: COMBINED ESOPHAGOSCOPY, GASTROSCOPY, DUODENOSCOPY (EGD);  Surgeon: Blu Vyas MD;  [facility]     ESOPHAGOSCOPY, GASTROSCOPY, DUODENOSCOPY (EGD), COMBINED N/A 2/20/2015    Procedure: COMBINED ESOPHAGOSCOPY, GASTROSCOPY, DUODENOSCOPY (EGD), BIOPSY SINGLE OR MULTIPLE;  Surgeon: Baron Scanlon MD;  [facility]     ESOPHAGOSCOPY, GASTROSCOPY,  DUODENOSCOPY (EGD), COMBINED N/A 9/30/2015    Procedure: COMBINED ESOPHAGOSCOPY, GASTROSCOPY, DUODENOSCOPY (EGD);  Surgeon: Francis Vyas MD;  Location:  GI     ESOPHAGOSCOPY, GASTROSCOPY, DUODENOSCOPY (EGD), COMBINED N/A 10/3/2019    Procedure: Upper Endoscopy;  Surgeon: Clif Morrow MD;  [facility]     GASTRECTOMY  6/22/2012    Procedure: GASTRECTOMY;  Open Approach, Excise Ulcers,Partial Gastrectomy, Esophagojejunostomy, Hiatal Hernia Repair, Extensive Lysis of Adhesions and Esaphagogastrodudenoscopy.;  Surgeon: Francis Vyas MD;  [facility]     GASTROJEJUNOSTOMY  08/26/09    Extensice enterolysis, partial resect. jejunum, part. resect gastric pouch, gastrojejunostomy anastomosis     HC ESOPH/GAS REFLUX TEST W NASAL IMPED ELECTRODE  8/5/2013    Procedure: ESOPHAGEAL IMPEDENCE FUNCTION TEST 1 HOUR OR LESS;  Surgeon: Halie Lang MD;  Location:  GI     HEAD & NECK SURGERY  2/15/2017    C5-C6     HERNIA REPAIR  2006    Umbilical hernia     HERNIORRHAPHY HIATAL  6/22/2012    Procedure: HERNIORRHAPHY HIATAL;;  Surgeon: Francis Vyas MD;  Location:  OR     HERNIORRHAPHY INGUINAL  11/22/2013    Procedure: HERNIORRHAPHY INGUINAL;;  Surgeon: Francis Vyas MD;  [facility]     INSERT PICC LINE Right 12/19/2019    Procedure: Picc Placement;  Surgeon: Per Dumont PA-C;  [facility]     INSERT PICC LINE Right 2/21/2020    Procedure: INSERTION, PICC;  Surgeon: Per Dumont PA-C;  [facility]     INSERT PORT VASCULAR ACCESS Right 12/19/2017    Procedure: INSERT PORT VASCULAR ACCESS;  Right Chest Port Placement ;  Surgeon: Lisandro Alejandro PA-C;  [facility]     INSERT PORT VASCULAR ACCESS Right 8/2/2018    Procedure: INSERT PORT VASCULAR ACCESS;  Place single lumen tunneled central venous access catheter;  Surgeon: Guy Jamil PA-C;  [facility]     IR CVC TUNNEL PLACEMENT > 5 YRS OF AGE  8/7/2019     IR CVC  TUNNEL PLACEMENT > 5 YRS OF AGE  4/14/2020     IR CVC TUNNEL PLACEMENT > 5 YRS OF AGE  8/3/2020     IR CVC TUNNEL PLACEMENT > 5 YRS OF AGE  9/4/2020     IR CVC TUNNEL PLACEMENT > 5 YRS OF AGE  2/5/2021     IR CVC TUNNEL PLACEMENT > 5 YRS OF AGE  3/23/2021     IR CVC TUNNEL PLACEMENT > 5 YRS OF AGE  1/13/2022     IR CVC TUNNEL PLACEMENT > 5 YRS OF AGE  5/12/2022     IR CVC TUNNEL PLACEMENT > 5 YRS OF AGE  7/13/2022     IR CVC TUNNEL REMOVAL RIGHT  10/1/2019     IR CVC TUNNEL REMOVAL RIGHT  7/30/2020     IR CVC TUNNEL REMOVAL RIGHT  9/2/2020     IR CVC TUNNEL REMOVAL RIGHT  2/3/2021     IR CVC TUNNEL REMOVAL RIGHT  3/19/2021     IR CVC TUNNEL REMOVAL RIGHT  1/10/2022     IR CVC TUNNEL REMOVAL RIGHT  5/9/2022     IR CVC TUNNEL REMOVAL RIGHT  7/8/2022     IR CVC TUNNEL REVISION LEFT  8/10/2022     IR CVC TUNNEL REVISION RIGHT  5/7/2021     IR FOLLOW UP VISIT OUTPATIENT  8/7/2019     IR PICC PLACEMENT > 5 YRS OF AGE  3/7/2019     IR PICC PLACEMENT > 5 YRS OF AGE  12/19/2019     IR PICC PLACEMENT > 5 YRS OF AGE  2/21/2020     LAPAROTOMY EXPLORATORY  11/22/2013    Procedure: LAPAROTOMY EXPLORATORY;  Exploratory Laparotomy, Upper Endoscopy, Left Inguinal Hernia Repair;  Surgeon: Francis Vyas MD;  [facility]     ORTHOPEDIC SURGERY       PICC INSERTION Right 03/16/2017    5fr DL BioFlo PICC, 42cm (3cm external) in the R medial brachial vein w/ tip in the SVC RA junction.     PICC INSERTION Left 09/23/2017    5fr DL BioFlo PICC, 45cm (1cm external) in the L basilic vein w/ tip in the SVC RA junction.     PICC INSERTION Right 05/16/2019    5Fr - 43cm, Medial brachial vein, low SVC     PICC INSERTION Right 10/02/2019    5Fr - 43cm (2cm external), basilic vein, low SVC     SHAYLEE EN Y BOWEL  2003     SOFT TISSUE SURGERY       THORACIC SURGERY       TONSILLECTOMY       TRANSESOPHAGEAL ECHOCARDIOGRAM INTRAOPERATIVE N/A 1/8/2019    Procedure: TRANSESOPHAGEAL ECHOCARDIOGRAM INTRAOPERATIVE;  Surgeon: GENERIC ANESTHESIA  PROVIDER;  [facility]     C GASTRIC BYPASS,OBESE<100CM SHAYLEE-EN-Y  2002    lost 300 pounds      Allergies   Allergen Reactions     Bactrim [Sulfamethoxazole-Trimethoprim] Rash     Penicillins Anaphylaxis     Please see Antimicrobial Management Team allergy assessment note 10/10/2018. Patient reported tolerating amoxicillin.     Ertapenem Nausea and Vomiting     Doxycycline Rash     Vancomycin Rash     Rash after receiving vancomycin 3/28/16 (infusion reaction?). Tolerated with slower infusion and diphenhydramine premed.      Social History     Tobacco Use     Smoking status: Light Smoker     Packs/day: 0.50     Years: 3.00     Pack years: 1.50     Types: Cigarettes     Smokeless tobacco: Never     Tobacco comments:     2/4/2021    smokes 3 cigarettes/day   Vaping Use     Vaping status: Never Used   Substance Use Topics     Alcohol use: No     Comment: quit 2002      Wt Readings from Last 1 Encounters:   06/04/23 87.1 kg (192 lb)              OUTSIDE LABS:  CBC:   Lab Results   Component Value Date    WBC 10.8 06/05/2023    WBC 6.9 06/04/2023    HGB 11.4 (L) 06/05/2023    HGB 10.5 (L) 06/04/2023    HCT 35.8 (L) 06/05/2023    HCT 32.3 (L) 06/04/2023     (L) 06/05/2023    PLT 91 (L) 06/04/2023     BMP:   Lab Results   Component Value Date     06/05/2023     06/04/2023    POTASSIUM 3.8 06/05/2023    POTASSIUM 3.3 (L) 06/04/2023    CHLORIDE 103 06/05/2023    CHLORIDE 102 06/04/2023    CO2 25 06/05/2023    CO2 24 06/04/2023    BUN 10.7 06/05/2023    BUN 10.5 06/04/2023    CR 0.74 06/05/2023    CR 0.75 06/04/2023    GLC 99 06/05/2023     (H) 06/04/2023     COAGS:   Lab Results   Component Value Date    PTT 28 01/21/2023    INR 1.09 01/21/2023    FIBR 345 07/07/2022     POC:   Lab Results   Component Value Date    BGM 83 03/23/2021     HEPATIC:   Lab Results   Component Value Date    ALBUMIN 3.1 (L) 06/04/2023    PROTTOTAL 5.7 (L) 06/04/2023    ALT 25 06/04/2023    AST 28 06/04/2023     ALKPHOS 74 06/04/2023    BILITOTAL 0.3 06/04/2023     OTHER:   Lab Results   Component Value Date    LACT 1.0 06/04/2023    A1C 4.9 07/23/2013    SILAS 8.4 (L) 06/05/2023    PHOS 3.8 05/09/2023    MAG 2.0 05/09/2023    LIPASE 20 05/28/2023    AMYLASE 178 (H) 06/28/2012    TSH 4.06 07/07/2022    CRP <2.9 10/04/2022    SED 18 (H) 07/16/2022       Anesthesia Plan       - Procedure: Procedure only, no anesthetic delivered                    Consents            Postoperative Care            Comments:                Ousmane Marquis, APRN CRNA

## 2023-06-05 NOTE — ED PROVIDER NOTES
History     Chief Complaint   Patient presents with     Fever     HPI  Parker Acevedo is a 52 year old male with a complex medical history to include short gut syndrome complicated by malnutrition requiring TPN, recurrent bacteremia, chronic abdominal pain, who presents to the emergency department for concerns of a fever.  The patient reports he has been running fevers on and off for couple weeks but it grew more constant over the last 2 days.  Prior to arrival was up to 104.9  F.  He took 500 mg Tylenol around 3:30 PM today.  He has had no significant cough or URI symptoms.  No urinary symptoms.  No increased abdominal pain from baseline.  He has had severe body aches with the fevers and chills.  He suspects fever related to an infected port.  He is on Lovenox due to chronic DVTs.  He has a J-tube for venting purposes.  He is able to take medication orally.        Allergies:  Allergies   Allergen Reactions     Bactrim [Sulfamethoxazole-Trimethoprim] Rash     Penicillins Anaphylaxis     Please see Antimicrobial Management Team allergy assessment note 10/10/2018. Patient reported tolerating amoxicillin.     Ertapenem Nausea and Vomiting     Doxycycline Rash     Vancomycin Rash     Rash after receiving vancomycin 3/28/16 (infusion reaction?). Tolerated with slower infusion and diphenhydramine premed.       Problem List:    Patient Active Problem List    Diagnosis Date Noted     Gram-negative bacteremia 07/07/2022     Priority: Medium     Bacteremia associated with intravascular line, initial encounter (H) 05/07/2022     Priority: Medium     Acute deep vein thrombosis (DVT) of axillary vein of right upper extremity (H) 02/28/2022     Priority: Medium     Fever, unknown origin 03/19/2021     Priority: Medium     Short bowel syndrome 01/30/2021     Priority: Medium     Bloodstream infection associated with central venous catheter, initial encounter 01/30/2021     Priority: Medium     Gastric outlet obstruction  10/07/2020     Priority: Medium     Added automatically from request for surgery [id number]       Gram-positive bacteremia 09/29/2019     Priority: Medium     On total parenteral nutrition 09/29/2019     Priority: Medium     Added automatically from request for surgery [id number]       PICC line infiltration, sequela 07/22/2019     Priority: Medium     Infection by Candida species 01/04/2019     Priority: Medium     Bacteremia 10/10/2018     Priority: Medium     Hyperlipidemia LDL goal <130 08/07/2018     Priority: Medium     S/P bariatric surgery 07/30/2018     Priority: Medium     Generalized weakness 01/30/2018     Priority: Medium     Catheter-related bloodstream infection (CRBSI) 09/23/2017     Priority: Medium     Low serum iron 09/08/2017     Priority: Medium     Anemia, iron deficiency 09/08/2017     Priority: Medium     Abnormal echocardiogram 04/11/2017     Priority: Medium     Port or reservoir infection, initial encounter 03/16/2017     Priority: Medium     Status post cervical spinal arthrodesis 02/15/2017     Priority: Medium     Short gut syndrome      Priority: Medium     Anxiety      Priority: Medium     Chronic nausea 05/10/2016     Priority: Medium     Fungemia 04/11/2016     Priority: Medium     Positive blood culture 03/29/2016     Priority: Medium     Insomnia 08/13/2015     Priority: Medium     Chronic pain 07/07/2015     Priority: Medium     Patient is followed by Dr. Milligan for ongoing prescription of pain medication.  All refills should be approved by this provider, or covering partner.    Medication(s): Fentanyl 50 mcg patches every three days/ Liquid oxycodone 5 mg/5ml up to 50 mg daily.   Maximum quantity per month: as per Dr. Milligan through [facility]  Clinic visit frequency required: Q 3 months at Pain Management    Controlled substance agreement on file: Yes       Date(s): Through Pain Management    Pain Clinic evaluation in the past: Yes       Date(s):  Ongoing every three  months       Location(s):  Per pain management    DIRE Total Score(s):  No flowsheet data found.    Last [facility] website verification:  Through [facility]   [email or web address]    Esteban Daly MD             Health Care Home 02/24/2015     Priority: Medium              Iron deficiency 05/23/2014     Priority: Medium     Vitamin D deficiency 05/22/2014     Priority: Medium     Former smoker 02/24/2014     Priority: Medium     Bile reflux esophagitis 10/16/2013     Priority: Medium     Constipation 10/01/2013     Priority: Medium     Chronic anxiety 09/25/2013     Priority: Medium     Dysphagia 09/17/2013     Priority: Medium     Weight loss, non-intentional 09/17/2013     Priority: Medium     Malnutrition (H) 09/17/2013     Priority: Medium     Dehydration 08/28/2013     Priority: Medium     Vitamin B12 deficiency without anemia 07/31/2013     Priority: Medium     Diagnosis updated by automated process. Provider to review and confirm.       Thiamine deficiency 07/31/2013     Priority: Medium     ADHD (attention deficit hyperactivity disorder), inattentive type 07/02/2013     Priority: Medium     Patient is followed by RICCO SHARP for ongoing prescription of stimulants.  All refills should be approved by this provider, or covering partner.    Medication(s): Adderall.   Maximum quantity per month: 30  Clinic visit frequency required:      Controlled substance agreement on file: No  Neuropsych evaluation for ADD completed:  No    Last [facility] website verification:  done on 5/6/19  [email or web address]         Anemia 09/20/2012     Priority: Medium     Vomiting 06/28/2012     Priority: Medium     Chronic abdominal pain 06/01/2012     Priority: Medium     Patient is followed by ALEXANDRIA WHITLEY for ongoing prescription of narcotic pain medicine.  Med: liquid oxycodone up to 60 mg per day.   Maximum use per month:   Expected duration: ongoing, hope per surgery that will resolve with  upcoming surgery  Narcotic agreement on file: YES  Clinic visit recommended: Q 3 months         Peptic ulcer disease 04/08/2010     Priority: Medium     Gastric bypass status for obesity 04/08/2010     Priority: Medium     Top weight of 492.          Past Medical History:    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)      Chronic abdominal pain      CLABSI (central line-associated bloodstream infection)      Complication of anesthesia      Difficulty swallowing      Gastric ulcer, unspecified as acute or chronic, without mention of hemorrhage, perforation, or obstruction      Gastro-oesophageal reflux disease      Head injury      Hiatal hernia      Other bladder disorder      Other chronic pain      PONV (postoperative nausea and vomiting)      Severe malnutrition (H)      Short gut syndrome      Tobacco abuse        Past Surgical History:    Past Surgical History:   Procedure Laterality Date     AMPUTATION       APPENDECTOMY       BACK SURGERY  11/3/2014    curve in the spine     BIOPSY LYMPH NODE CERVICAL N/A 2/20/2015    Procedure: BIOPSY LYMPH NODE CERVICAL;  Surgeon: Baron Scanlon MD;  [facility]     CHOLECYSTECTOMY       COLONOSCOPY N/A 7/14/2021    Procedure: COLONOSCOPY;  Surgeon: Jimbo Estrada MD;  Location: [facility] OR     COLONOSCOPY N/A 4/13/2022    Procedure: COLONOSCOPY;  Surgeon: Jimbo Estrada MD;  Location: [facility] OR     DISCECTOMY, FUSION CERVICAL ANTERIOR ONE LEVEL, COMBINED N/A 2/15/2017    Procedure: COMBINED DISCECTOMY, FUSION CERVICAL ANTERIOR ONE LEVEL;  Surgeon: Darren Campos MD;  [facility]     ENDOSCOPIC INSERTION TUBE GASTROSTOMY  9/9/2013    Procedure: ENDOSCOPIC INSERTION TUBE GASTROSTOMY;;  Surgeon: Francis Vyas MD;  [facility]     ENDOSCOPIC ULTRASOUND UPPER GASTROINTESTINAL TRACT (GI)  4/29/2011    Procedure:ENDOSCOPIC  ULTRASOUND UPPER GASTROINTESTINAL TRACT (GI); Both Procedures done Conjointly; Surgeon:NEREIDA HOUSER; [facility]     ENDOSCOPIC ULTRASOUND UPPER GASTROINTESTINAL TRACT (GI)  9/9/2013    Procedure: ENDOSCOPIC ULTRASOUND UPPER GASTROINTESTINAL TRACT (GI);  Endoscopic Ultrasound Guide Gastrostomy Tube Placement  C-arm;  Surgeon: Noe Lizarraga MD;  [facility]     ENDOSCOPIC ULTRASOUND UPPER GASTROINTESTINAL TRACT (GI) N/A 2/24/2021    Procedure: ENDOSCOPIC ULTRASOUND, ESOPHAGOSCOPY / UPPER GASTROINTESTINAL TRACT (GI), esophagastrogastroduodenoscopy;  Surgeon: Berny Bach MD;  [facility]     ENDOSCOPY  03/25/11    EGD, [facility]     ENDOSCOPY  08/04/09    Upper Endoscopy, [facility]     ENDOSCOPY  01/05/09    Upper Endoscopy, [facility]     ESOPHAGOSCOPY, GASTROSCOPY, DUODENOSCOPY (EGD), COMBINED  4/20/2011    Procedure:COMBINED ESOPHAGOSCOPY, GASTROSCOPY, DUODENOSCOPY (EGD); Surgeon:BLU VYAS; [facility]     ESOPHAGOSCOPY, GASTROSCOPY, DUODENOSCOPY (EGD), COMBINED  6/15/2011    Procedure:COMBINED ESOPHAGOSCOPY, GASTROSCOPY, DUODENOSCOPY (EGD); Surgeon:BLU VYAS; [facility]     ESOPHAGOSCOPY, GASTROSCOPY, DUODENOSCOPY (EGD), COMBINED  6/12/2013    Procedure: COMBINED ESOPHAGOSCOPY, GASTROSCOPY, DUODENOSCOPY (EGD);;  Surgeon: Blu Vyas MD;  [facility]     ESOPHAGOSCOPY, GASTROSCOPY, DUODENOSCOPY (EGD), COMBINED  11/22/2013    Procedure: COMBINED ESOPHAGOSCOPY, GASTROSCOPY, DUODENOSCOPY (EGD);;  Surgeon: Blu Vyas MD;  [facility]     ESOPHAGOSCOPY, GASTROSCOPY, DUODENOSCOPY (EGD), COMBINED  4/30/2014    Procedure: COMBINED ESOPHAGOSCOPY, GASTROSCOPY, DUODENOSCOPY (EGD);  Surgeon: Blu Vyas MD;  [facility]     ESOPHAGOSCOPY, GASTROSCOPY, DUODENOSCOPY (EGD), COMBINED N/A 2/20/2015    Procedure: COMBINED ESOPHAGOSCOPY, GASTROSCOPY, DUODENOSCOPY (EGD), BIOPSY SINGLE OR MULTIPLE;  Surgeon:  Baron Scanlon MD;  [facility]     ESOPHAGOSCOPY, GASTROSCOPY, DUODENOSCOPY (EGD), COMBINED N/A 9/30/2015    Procedure: COMBINED ESOPHAGOSCOPY, GASTROSCOPY, DUODENOSCOPY (EGD);  Surgeon: Francis Vyas MD;  Location:  GI     ESOPHAGOSCOPY, GASTROSCOPY, DUODENOSCOPY (EGD), COMBINED N/A 10/3/2019    Procedure: Upper Endoscopy;  Surgeon: Clif Morrow MD;  [facility]     GASTRECTOMY  6/22/2012    Procedure: GASTRECTOMY;  Open Approach, Excise Ulcers,Partial Gastrectomy, Esophagojejunostomy, Hiatal Hernia Repair, Extensive Lysis of Adhesions and Esaphagogastrodudenoscopy.;  Surgeon: Francis Vyas MD;  [facility]     GASTROJEJUNOSTOMY  08/26/09    Extensice enterolysis, partial resect. jejunum, part. resect gastric pouch, gastrojejunostomy anastomosis     HC ESOPH/GAS REFLUX TEST W NASAL IMPED ELECTRODE  8/5/2013    Procedure: ESOPHAGEAL IMPEDENCE FUNCTION TEST 1 HOUR OR LESS;  Surgeon: Halie Lang MD;  Location:  GI     HEAD & NECK SURGERY  2/15/2017    C5-C6     HERNIA REPAIR  2006    Umbilical hernia     HERNIORRHAPHY HIATAL  6/22/2012    Procedure: HERNIORRHAPHY HIATAL;;  Surgeon: Francis Vyas MD;  Location:  OR     HERNIORRHAPHY INGUINAL  11/22/2013    Procedure: HERNIORRHAPHY INGUINAL;;  Surgeon: Francis Vyas MD;  [facility]     INSERT PICC LINE Right 12/19/2019    Procedure: Picc Placement;  Surgeon: Per Dumont PA-C;  [facility]     INSERT PICC LINE Right 2/21/2020    Procedure: INSERTION, PICC;  Surgeon: Per Dumont PA-C;  [facility]     INSERT PORT VASCULAR ACCESS Right 12/19/2017    Procedure: INSERT PORT VASCULAR ACCESS;  Right Chest Port Placement ;  Surgeon: Lisandro Alejandro PA-C;  [facility]     INSERT PORT VASCULAR ACCESS Right 8/2/2018    Procedure: INSERT PORT VASCULAR ACCESS;  Place single lumen tunneled central venous access catheter;  Surgeon: Guy Jamil PA-C;  [facility]     IR CVC TUNNEL PLACEMENT > 5 YRS OF AGE  8/7/2019     IR CVC TUNNEL PLACEMENT > 5 YRS OF AGE  4/14/2020     IR CVC TUNNEL PLACEMENT > 5 YRS OF AGE  8/3/2020     IR CVC TUNNEL PLACEMENT > 5 YRS OF AGE  9/4/2020     IR CVC TUNNEL PLACEMENT > 5 YRS OF AGE  2/5/2021     IR CVC TUNNEL PLACEMENT > 5 YRS OF AGE  3/23/2021     IR CVC TUNNEL PLACEMENT > 5 YRS OF AGE  1/13/2022     IR CVC TUNNEL PLACEMENT > 5 YRS OF AGE  5/12/2022     IR CVC TUNNEL PLACEMENT > 5 YRS OF AGE  7/13/2022     IR CVC TUNNEL REMOVAL RIGHT  10/1/2019     IR CVC TUNNEL REMOVAL RIGHT  7/30/2020     IR CVC TUNNEL REMOVAL RIGHT  9/2/2020     IR CVC TUNNEL REMOVAL RIGHT  2/3/2021     IR CVC TUNNEL REMOVAL RIGHT  3/19/2021     IR CVC TUNNEL REMOVAL RIGHT  1/10/2022     IR CVC TUNNEL REMOVAL RIGHT  5/9/2022     IR CVC TUNNEL REMOVAL RIGHT  7/8/2022     IR CVC TUNNEL REVISION LEFT  8/10/2022     IR CVC TUNNEL REVISION RIGHT  5/7/2021     IR FOLLOW UP VISIT OUTPATIENT  8/7/2019     IR PICC PLACEMENT > 5 YRS OF AGE  3/7/2019     IR PICC PLACEMENT > 5 YRS OF AGE  12/19/2019     IR PICC PLACEMENT > 5 YRS OF AGE  2/21/2020     LAPAROTOMY EXPLORATORY  11/22/2013    Procedure: LAPAROTOMY EXPLORATORY;  Exploratory Laparotomy, Upper Endoscopy, Left Inguinal Hernia Repair;  Surgeon: Francis Vyas MD;  [facility]     ORTHOPEDIC SURGERY       PICC INSERTION Right 03/16/2017    5fr DL BioFlo PICC, 42cm (3cm external) in the R medial brachial vein w/ tip in the SVC RA junction.     PICC INSERTION Left 09/23/2017    5fr DL BioFlo PICC, 45cm (1cm external) in the L basilic vein w/ tip in the SVC RA junction.     PICC INSERTION Right 05/16/2019    5Fr - 43cm, Medial brachial vein, low SVC     PICC INSERTION Right 10/02/2019    5Fr - 43cm (2cm external), basilic vein, low SVC     SHAYLEE EN Y BOWEL  2003     SOFT TISSUE SURGERY       THORACIC SURGERY       TONSILLECTOMY       TRANSESOPHAGEAL ECHOCARDIOGRAM INTRAOPERATIVE N/A 1/8/2019    Procedure:  TRANSESOPHAGEAL ECHOCARDIOGRAM INTRAOPERATIVE;  Surgeon: GENERIC ANESTHESIA PROVIDER;  Location:  OR     [facility] GASTRIC BYPASS,OBESE<100CM SHAYLEE-EN-Y  2002    lost 300 pounds       Family History:    Family History   Problem Relation Age of Onset     Gastrointestinal Disease Mother         Crohns disease     Anxiety Disorder Mother      Thyroid Disease Mother         Grave's disease     Cancer Father         ear cancer-skin cancer/melanoma     Breast Cancer Maternal Grandmother      Macular Degeneration Maternal Grandfather      Anxiety Disorder Sister      Diabetes Maternal Uncle      Breast Cancer Other      Hypertension No family hx of      Hyperlipidemia No family hx of      Cerebrovascular Disease No family hx of      Prostate Cancer No family hx of      Depression No family hx of      Anesthesia Reaction No family hx of      Asthma No family hx of      Osteoporosis No family hx of      Genetic Disorder No family hx of      Obesity No family hx of      Mental Illness No family hx of      Substance Abuse No family hx of      Glaucoma No family hx of        Social History:  Marital Status:   [2]  Social History     Tobacco Use     Smoking status: Light Smoker     Packs/day: 0.50     Years: 3.00     Pack years: 1.50     Types: Cigarettes     Smokeless tobacco: Never     Tobacco comments:     2/4/2021    smokes 3 cigarettes/day   Vaping Use     Vaping status: Never Used   Substance Use Topics     Alcohol use: No     Comment: quit 2002     Drug use: No        Medications:    albuterol (VENTOLIN HFA) 108 (90 Base) MCG/ACT inhaler  amphetamine-dextroamphetamine (ADDERALL) 20 MG tablet  carvedilol (COREG) 6.25 MG tablet  cyanocobalamin (CYANOCOBALAMIN) 1000 MCG/ML injection  enoxaparin ANTICOAGULANT (LOVENOX) 80 MG/0.8ML syringe  EPINEPHrine (ANY BX GENERIC EQUIV) 0.3 MG/0.3ML injection 2-pack  [facility]  fentaNYL (DURAGESIC) 25 mcg/hr 72 hr patch  lidocaine (LIDODERM) 5 %  "patch  LORazepam (ATIVAN) 1 MG tablet  multivitamin, therapeutic (THERA-VIT) TABS tablet  naloxone (NARCAN) 4 MG/0.1ML nasal spray  nystatin (MYCOSTATIN) 513059 UNIT/GM external cream  ondansetron (ZOFRAN ODT) 8 MG ODT tab  oxyCODONE (ROXICODONE) 5 MG/5ML solution  pantoprazole (PROTONIX) 40 MG EC tablet  polyethylene glycol (MIRALAX) 17 GM/Dose powder  sucralfate (CARAFATE) 1 GM/10ML suspension  Syringe/Needle, Disp, (B-D ECLIPSE SYRINGE) 27G X 1/2\" 1 ML MISC  vitamin D2 (ERGOCALCIFEROL) 66659 units (1250 mcg) capsule          Review of Systems   All other systems reviewed and are negative.      Physical Exam   BP: 100/63  Pulse: 102  Temp: (!) 101.7  F (38.7  C)  Resp: 20  Height: 180.3 cm (5' 11\")  Weight: 87.1 kg (192 lb)  SpO2: 95 %      Physical Exam  Vitals and nursing note reviewed.   Constitutional:       General: He is not in acute distress.     Appearance: Normal appearance. He is well-developed. He is ill-appearing. He is not toxic-appearing or diaphoretic.   HENT:      Head: Normocephalic and atraumatic.      Nose: Nose normal.      Mouth/Throat:      Mouth: Mucous membranes are moist.      Pharynx: Oropharynx is clear.   Eyes:      Conjunctiva/sclera: Conjunctivae normal.      Pupils: Pupils are equal, round, and reactive to light.   Cardiovascular:      Rate and Rhythm: Regular rhythm. Tachycardia present.      Heart sounds: Normal heart sounds.   Pulmonary:      Effort: Pulmonary effort is normal. No respiratory distress.      Breath sounds: Normal breath sounds.      Comments: Catheter in left upper chest wall noted.  No surrounding erythema or warmth.  Abdominal:      General: Bowel sounds are normal. There is no distension.      Palpations: Abdomen is soft.      Tenderness: There is abdominal tenderness (mild generalized). There is no guarding or rebound.   Musculoskeletal:         General: No deformity.      Cervical back: Neck supple.   Skin:     General: Skin is warm and dry.   Neurological:    "   General: No focal deficit present.      Mental Status: He is alert and oriented to person, place, and time. Mental status is at baseline.      Coordination: Coordination normal.   Psychiatric:         Mood and Affect: Mood normal.         Behavior: Behavior normal.           ED Course             Procedures      Results for orders placed or performed during the hospital encounter of 06/04/23 (from the past 24 hour(s))   CBC with platelets differential    Narrative    The following orders were created for panel order CBC with platelets differential.  Procedure                               Abnormality         Status                     ---------                               -----------         ------                     CBC with platelets and d...[[id number]]  Abnormal            Final result                 Please view results for these tests on the individual orders.   Comprehensive metabolic panel   Result Value Ref Range    Sodium 137 136 - 145 mmol/L    Potassium 3.3 (L) 3.4 - 5.3 mmol/L    Chloride 102 98 - 107 mmol/L    Carbon Dioxide (CO2) 24 22 - 29 mmol/L    Anion Gap 11 7 - 15 mmol/L    Urea Nitrogen 10.5 6.0 - 20.0 mg/dL    Creatinine 0.75 0.67 - 1.17 mg/dL    Calcium 8.3 (L) 8.6 - 10.0 mg/dL    Glucose 103 (H) 70 - 99 mg/dL    Alkaline Phosphatase 74 40 - 129 U/L    AST 28 10 - 50 U/L    ALT 25 10 - 50 U/L    Protein Total 5.7 (L) 6.4 - 8.3 g/dL    Albumin 3.1 (L) 3.5 - 5.2 g/dL    Bilirubin Total 0.3 <=1.2 mg/dL    GFR Estimate >90 >60 mL/min/1.73m2   Lactic acid whole blood   Result Value Ref Range    Lactic Acid 1.0 0.7 - 2.0 mmol/L   Procalcitonin   Result Value Ref Range    Procalcitonin 16.09 (HH) <0.05 ng/mL   CBC with platelets and differential   Result Value Ref Range    WBC Count 6.9 4.0 - 11.0 10e3/uL    RBC Count 3.43 (L) 4.40 - 5.90 10e6/uL    Hemoglobin 10.5 (L) 13.3 - 17.7 g/dL    Hematocrit 32.3 (L) 40.0 - 53.0 %    MCV 94 78 - 100 fL    MCH 30.6 26.5 - 33.0 pg    MCHC 32.5 31.5 - 36.5  g/dL    RDW 15.9 (H) 10.0 - 15.0 %    Platelet Count 91 (L) 150 - 450 10e3/uL    % Neutrophils 92 %    % Lymphocytes 2 %    % Monocytes 5 %    % Eosinophils 1 %    % Basophils 0 %    % Immature Granulocytes 0 %    NRBCs per 100 WBC 0 <1 /100    Absolute Neutrophils 6.4 1.6 - 8.3 10e3/uL    Absolute Lymphocytes 0.2 (L) 0.8 - 5.3 10e3/uL    Absolute Monocytes 0.3 0.0 - 1.3 10e3/uL    Absolute Eosinophils 0.1 0.0 - 0.7 10e3/uL    Absolute Basophils 0.0 0.0 - 0.2 10e3/uL    Absolute Immature Granulocytes 0.0 <=0.4 10e3/uL    Absolute NRBCs 0.0 10e3/uL   XR Chest 2 Views    Narrative    EXAM: XR CHEST 2 VIEWS  LOCATION: [facility] [place]  DATE/TIME: 6/4/2023 9:25 PM CDT    INDICATION: Fever  COMPARISON: 01/21/2023      Impression    IMPRESSION: No obvious focal pneumonia. No pleural effusion. Normal heart size. Stable left central catheter tip at the distal SVC.         *Note: Due to a large number of results and/or encounters for the requested time period, some results have not been displayed. A complete set of results can be found in Results Review.       Medications   sodium chloride (PF) 0.9% PF flush 3 mL (has no administration in time range)   sodium chloride (PF) 0.9% PF flush 3 mL (3 mLs Intracatheter $Given 6/4/23 8355)   vancomycin (VANCOCIN) 2,000 mg in sodium chloride 0.9 % 500 mL intermittent infusion (2,000 mg Intravenous $New Bag 6/4/23 1395)   LORazepam (ATIVAN) injection 1 mg (has no administration in time range)   amphetamine-dextroamphetamine (ADDERALL) per tablet 20 mg (has no administration in time range)   carvedilol (COREG) tablet 12.5 mg (has no administration in time range)   enoxaparin ANTICOAGULANT (LOVENOX) injection 80 mg (has no administration in time range)   oxyCODONE (ROXICODONE) solution 5-10 mg (has no administration in time range)   pantoprazole (PROTONIX) EC tablet 40 mg (has no administration in time range)   0.9% sodium chloride BOLUS (1,000 mLs Intravenous  $New Bag 6/4/23 2107)   acetaminophen (TYLENOL) solution 1,000 mg (1,000 mg Oral $Given 6/4/23 2047)   ceFEPIme (MAXIPIME) 2 g vial to attach to  ml bag for ADULTS or 50 ml bag for PEDS (0 g Intravenous Stopped 6/4/23 2255)   diphenhydrAMINE (BENADRYL) injection 25 mg (25 mg Intravenous $Given 6/4/23 2206)          Assessments & Plan (with Medical Decision Making)  Parker Acevedo is a 52 year old male who presented to the ED complaining of a fever.  This has been on and off for the last couple weeks but it got more constant over the last couple days.  He has had significant body aches associated but no other symptoms associated.  He has a complex history to include recurrent catheter related bloodstream infections.  On arrival to the ED he had a temp of 101.7  F and was tachycardic.  Blood pressure within normal limits.  He appeared ill but nontoxic.  No significant abnormalities found on exam today.  Concern for recurrent bacteremia given his history and fever.  IV was established and labs drawn for further evaluation.  He was given bolus of fluids initially with Tylenol.  His lactic acid and white count were within normal limits however his procalcitonin came back critically high at 16.09, concerning for severe sepsis.  Because of this I ordered broad-spectrum antibiotics of cefepime and vancomycin.  He does have an allergy of Vanco but tolerates it with a slower infusion and premedicated Benadryl so this was performed and he did well.  Blood cultures are currently pending.  His chest x-ray did not show any infiltrates.  I do think he likely has recurrent bacteremia given his history and lab findings today.  He will require hospital admission for trending of cultures and continued IV antibiotics.  He reported that he would only go to the [facility] because that is where he can get his catheter replaced.  We called the [facility] for transfer and naturally they had no beds available at this time.  He will  board in the ED for continued antibiotic therapy and medical monitoring until a bed is made available.  During course of ED stay he requested to go out and have a cigarette.  I explained to him that it is a bit counterintuitive to have patient's leave to go smoke and if he were to leave it would be AMA because he is critically ill at this time.  He explained that he needs to smoke to help with his anxiety so I offered him Ativan and instead which he did agree to.  Patient will be signed out to Dr. Basurto at shift change.  Disposition pending bed placement at the [facility].     I have reviewed the nursing notes.    I have reviewed the findings, diagnosis, plan and need for follow up with the patient.      New Prescriptions    No medications on file       Final diagnoses:   Sepsis (H)   On total parenteral nutrition   History of bacteremia     Note: Chart documentation done in part with Dragon Voice Recognition software. Although reviewed after completion, some word and grammatical errors may remain.     6/4/2023   [facility] EMERGENCY DEPT     Pinky Anton PA-C  06/04/23 2165

## 2023-06-05 NOTE — ANESTHESIA POSTPROCEDURE EVALUATION
Patient: Parker Acevedo    Procedure: * No procedures listed *       Anesthesia Type:  No value filed.    Note:  Disposition: Inpatient   Postop Pain Control: Uneventful   PONV: No   Neuro/Psych: Uneventful            Sign Out: Acceptable/Baseline neuro status   Airway/Respiratory: Uneventful            Sign Out: Acceptable/Baseline resp. status   CV/Hemodynamics: Uneventful            Sign Out: Acceptable CV status   Other NRE: NONE   DID A NON-ROUTINE EVENT OCCUR? No    Event details/Postop Comments:  Pt was happy with anesthesia care.  No complications.  I will follow up with the pt if needed. Pt left his ED room immediately after IV placement.  He was advised to stay in his room until after his antibiotic therapy.  However, he insisted and walked out of the room.             Last vitals:  Vitals:    06/05/23 0400 06/05/23 0534 06/05/23 0817   BP: 96/72 108/73 115/80   Pulse: 70 74 78   Resp: 18 18    Temp:  97.8  F (36.6  C)    SpO2: 99% 99%        Electronically Signed By: SAILAJA Sherwood CRNA  June 5, 2023  4:08 PM

## 2023-06-05 NOTE — ED PROVIDER NOTES
I assumed patient's care at change of shift from Pinky Anton PA-C.  Parker is a 52-year-old gentleman with a complex medical history including short gut syndrome complicated by malnutrition requiring TPN given through a central line, history of recurrent bacteremia in today with a fever.    White count and lactic acid was normal but his procalcitonin is markedly elevated at 16.09.  He was started on cefepime and vancomycin and is able to tolerate that if the infusion is slowed down and he is premedicated with Benadryl.  Blood cultures were sent prior to antibiotics.  Chest x-ray was unremarkable.  He reports that he can only go to the [facility] because that is where he can get his catheter replaced as it may be infected.  He refuses to go anywhere else.  They have no beds available at this time so he will be boarding in the ED tonight for continued IV antibiotics and monitoring until a bed is available.  He does have underlying anxiety and treated with Ativan.    He asked for his Carafate which was given along with some more Ativan for anxiety.  Refused any more IV fluids.    Pharmacy called with concern about his thrombocytopenia.  Platelets are 91 K.  They suggested holding his Lovenox.  He typically runs in the mid to upper 100s.    We will recheck his CBC and basic profile in the morning.  Potassium just slightly low at 3.3.  That has been a chronic issue for him.    Dr. Delgado assumed his care at change of shift.  See his note for final disposition.     Collin Chahal MD  06/05/23 2045

## 2023-06-05 NOTE — ED TRIAGE NOTES
Reports fevers since yesterday.      Triage Assessment     Row Name 06/04/23 2001       Triage Assessment (Adult)    Airway WDL WDL       Respiratory WDL    Respiratory WDL WDL       Skin Circulation/Temperature WDL    Skin Circulation/Temperature WDL WDL       Cardiac WDL    Cardiac WDL WDL       Peripheral/Neurovascular WDL    Peripheral Neurovascular WDL WDL       Cognitive/Neuro/Behavioral WDL    Cognitive/Neuro/Behavioral WDL WDL

## 2023-06-05 NOTE — ED NOTES
Pt in a constant state of talking about his plan to leave AMA. Pt appears frustrated with the process of waiting for a bed, getting multiple pokes for failed IV's. Pt plans to leave AMA if the next IV poke fails. He is willing to let anesthesia try for an IV after the provider got an IV via ultrasound (this one also blew). Provider and primary nurse from AM and this afternoon as well as his significant other have had in depth conversations about why this would not be in his best interest. Anesthesia paged and they will be down to attempt IV insertion. Beatriz Hancock RN

## 2023-06-06 ENCOUNTER — TELEPHONE (OUTPATIENT)
Dept: INTERNAL MEDICINE | Facility: CLINIC | Age: 53
End: 2023-06-06
Payer: COMMERCIAL

## 2023-06-06 ENCOUNTER — TELEPHONE (OUTPATIENT)
Dept: INTERNAL MEDICINE | Facility: CLINIC | Age: 53
End: 2023-06-06

## 2023-06-06 ENCOUNTER — TELEPHONE (OUTPATIENT)
Dept: SURGERY | Facility: CLINIC | Age: 53
End: 2023-06-06
Payer: COMMERCIAL

## 2023-06-06 ENCOUNTER — DOCUMENTATION ONLY (OUTPATIENT)
Dept: PHARMACY | Facility: CLINIC | Age: 53
End: 2023-06-06
Payer: COMMERCIAL

## 2023-06-06 LAB
BACTERIA UR CULT: NO GROWTH
BASOPHILS # BLD AUTO: 0 10E3/UL (ref 0–0.2)
BASOPHILS NFR BLD AUTO: 1 %
CREAT SERPL-MCNC: 0.63 MG/DL (ref 0.67–1.17)
EOSINOPHIL # BLD AUTO: 0.4 10E3/UL (ref 0–0.7)
EOSINOPHIL NFR BLD AUTO: 4 %
ERYTHROCYTE [DISTWIDTH] IN BLOOD BY AUTOMATED COUNT: 16 % (ref 10–15)
GFR SERPL CREATININE-BSD FRML MDRD: >90 ML/MIN/1.73M2
HCT VFR BLD AUTO: 37.1 % (ref 40–53)
HGB BLD-MCNC: 11.8 G/DL (ref 13.3–17.7)
IMM GRANULOCYTES # BLD: 0 10E3/UL
IMM GRANULOCYTES NFR BLD: 1 %
LYMPHOCYTES # BLD AUTO: 1.5 10E3/UL (ref 0.8–5.3)
LYMPHOCYTES NFR BLD AUTO: 17 %
MCH RBC QN AUTO: 30.2 PG (ref 26.5–33)
MCHC RBC AUTO-ENTMCNC: 31.8 G/DL (ref 31.5–36.5)
MCV RBC AUTO: 95 FL (ref 78–100)
MONOCYTES # BLD AUTO: 1.6 10E3/UL (ref 0–1.3)
MONOCYTES NFR BLD AUTO: 18 %
NEUTROPHILS # BLD AUTO: 5.2 10E3/UL (ref 1.6–8.3)
NEUTROPHILS NFR BLD AUTO: 59 %
NRBC # BLD AUTO: 0 10E3/UL
NRBC BLD AUTO-RTO: 0 /100
PLATELET # BLD AUTO: 134 10E3/UL (ref 150–450)
RBC # BLD AUTO: 3.91 10E6/UL (ref 4.4–5.9)
VANCOMYCIN SERPL-MCNC: 6.7 UG/ML
WBC # BLD AUTO: 8.8 10E3/UL (ref 4–11)

## 2023-06-06 PROCEDURE — 250N000011 HC RX IP 250 OP 636: Performed by: PHYSICIAN ASSISTANT

## 2023-06-06 PROCEDURE — 250N000013 HC RX MED GY IP 250 OP 250 PS 637: Performed by: PHYSICIAN ASSISTANT

## 2023-06-06 PROCEDURE — 258N000003 HC RX IP 258 OP 636: Performed by: PHYSICIAN ASSISTANT

## 2023-06-06 PROCEDURE — 250N000011 HC RX IP 250 OP 636: Performed by: EMERGENCY MEDICINE

## 2023-06-06 PROCEDURE — 96376 TX/PRO/DX INJ SAME DRUG ADON: CPT | Mod: 59 | Performed by: PHYSICIAN ASSISTANT

## 2023-06-06 PROCEDURE — 36415 COLL VENOUS BLD VENIPUNCTURE: CPT | Performed by: FAMILY MEDICINE

## 2023-06-06 PROCEDURE — 250N000011 HC RX IP 250 OP 636: Performed by: FAMILY MEDICINE

## 2023-06-06 PROCEDURE — 96372 THER/PROPH/DIAG INJ SC/IM: CPT | Performed by: FAMILY MEDICINE

## 2023-06-06 PROCEDURE — 96367 TX/PROPH/DG ADDL SEQ IV INF: CPT | Mod: 59 | Performed by: PHYSICIAN ASSISTANT

## 2023-06-06 PROCEDURE — 85025 COMPLETE CBC W/AUTO DIFF WBC: CPT | Performed by: FAMILY MEDICINE

## 2023-06-06 PROCEDURE — 82565 ASSAY OF CREATININE: CPT | Performed by: FAMILY MEDICINE

## 2023-06-06 PROCEDURE — 250N000013 HC RX MED GY IP 250 OP 250 PS 637: Performed by: FAMILY MEDICINE

## 2023-06-06 PROCEDURE — 80202 ASSAY OF VANCOMYCIN: CPT | Performed by: FAMILY MEDICINE

## 2023-06-06 RX ORDER — ENOXAPARIN SODIUM 100 MG/ML
1 INJECTION SUBCUTANEOUS EVERY 12 HOURS
Status: DISCONTINUED | OUTPATIENT
Start: 2023-06-06 | End: 2023-06-07 | Stop reason: HOSPADM

## 2023-06-06 RX ORDER — ONDANSETRON 4 MG/1
4 TABLET, ORALLY DISINTEGRATING ORAL EVERY 6 HOURS PRN
Status: DISCONTINUED | OUTPATIENT
Start: 2023-06-06 | End: 2023-06-07 | Stop reason: HOSPADM

## 2023-06-06 RX ORDER — FENTANYL 25 UG/1
25 PATCH TRANSDERMAL
Status: DISCONTINUED | OUTPATIENT
Start: 2023-06-06 | End: 2023-06-07 | Stop reason: HOSPADM

## 2023-06-06 RX ORDER — LORAZEPAM 2 MG/ML
1 INJECTION INTRAMUSCULAR ONCE
Status: COMPLETED | OUTPATIENT
Start: 2023-06-06 | End: 2023-06-06

## 2023-06-06 RX ORDER — DIPHENHYDRAMINE HYDROCHLORIDE 50 MG/ML
25 INJECTION INTRAMUSCULAR; INTRAVENOUS 2 TIMES DAILY PRN
Status: DISCONTINUED | OUTPATIENT
Start: 2023-06-06 | End: 2023-06-07 | Stop reason: HOSPADM

## 2023-06-06 RX ORDER — CEFAZOLIN SODIUM 1 G/50ML
2000 SOLUTION INTRAVENOUS EVERY 12 HOURS
Status: DISCONTINUED | OUTPATIENT
Start: 2023-06-07 | End: 2023-06-07 | Stop reason: HOSPADM

## 2023-06-06 RX ADMIN — CARVEDILOL 12.5 MG: 6.25 TABLET, FILM COATED ORAL at 18:27

## 2023-06-06 RX ADMIN — FENTANYL 1 PATCH: 25 PATCH TRANSDERMAL at 08:46

## 2023-06-06 RX ADMIN — NYSTATIN: 100000 CREAM TOPICAL at 20:54

## 2023-06-06 RX ADMIN — CARVEDILOL 12.5 MG: 6.25 TABLET, FILM COATED ORAL at 08:43

## 2023-06-06 RX ADMIN — ENOXAPARIN SODIUM 80 MG: 80 INJECTION SUBCUTANEOUS at 08:44

## 2023-06-06 RX ADMIN — LORAZEPAM 1 MG: 2 INJECTION INTRAMUSCULAR; INTRAVENOUS at 22:12

## 2023-06-06 RX ADMIN — DIPHENHYDRAMINE HYDROCHLORIDE 25 MG: 50 INJECTION, SOLUTION INTRAMUSCULAR; INTRAVENOUS at 09:49

## 2023-06-06 RX ADMIN — OXYCODONE HYDROCHLORIDE 10 MG: 5 SOLUTION ORAL at 06:50

## 2023-06-06 RX ADMIN — VANCOMYCIN HYDROCHLORIDE 2000 MG: 1 INJECTION, POWDER, LYOPHILIZED, FOR SOLUTION INTRAVENOUS at 09:45

## 2023-06-06 RX ADMIN — CEFTRIAXONE SODIUM 2 G: 2 INJECTION, POWDER, FOR SOLUTION INTRAMUSCULAR; INTRAVENOUS at 14:46

## 2023-06-06 RX ADMIN — OXYCODONE HYDROCHLORIDE 10 MG: 5 SOLUTION ORAL at 21:28

## 2023-06-06 RX ADMIN — OXYCODONE HYDROCHLORIDE 10 MG: 5 SOLUTION ORAL at 02:54

## 2023-06-06 RX ADMIN — ONDANSETRON 4 MG: 4 TABLET, ORALLY DISINTEGRATING ORAL at 02:54

## 2023-06-06 RX ADMIN — ENOXAPARIN SODIUM 80 MG: 80 INJECTION SUBCUTANEOUS at 18:30

## 2023-06-06 RX ADMIN — NYSTATIN: 100000 CREAM TOPICAL at 08:35

## 2023-06-06 RX ADMIN — DEXTROAMPHETAMINE SACCHARATE, AMPHETAMINE ASPARTATE, DEXTROAMPHETAMINE SULFATE AND AMPHETAMINE SULFATE 20 MG: 5; 5; 5; 5 TABLET ORAL at 19:00

## 2023-06-06 ASSESSMENT — ACTIVITIES OF DAILY LIVING (ADL)
ADLS_ACUITY_SCORE: 37

## 2023-06-06 NOTE — ED NOTES
Rounded on pt-not room at this time. Spouse stated he was in the bathroom but was noted to be outside per registration

## 2023-06-06 NOTE — ED PROVIDER NOTES
I received this patient's care at shift change.  Dr. Delgado has acceptance at [facility], but there is no bed availability.  They are not expecting bed availability until potentially tomorrow.  Patient is currently on vancomycin and cefepime IV for initial blood culture positive for Klebsiella infection.  Has a history of port infection.  Was febrile upon presentation and had an elevated procalcitonin level.  Patient is comfortable.  Vital signs have remained stable.  No fever, tachycardia, or tachypnea.  Patient has had times where he has threatened to leave, but now is stating that he will stay overnight.       Shay Beasley PA-C  06/05/23 2100

## 2023-06-06 NOTE — TELEPHONE ENCOUNTER
[facility] Call Center    Phone Message    May a detailed message be left on voicemail: yes     Reason for Call: Other:     Pt's spouse is requesting a call back ASAP to discuss when the pt will be transferred from the [facility] to [place].       Action Taken: Message routed to:  [facility] Gen Surg    Travel Screening: Not Applicable

## 2023-06-06 NOTE — TELEPHONE ENCOUNTER
Patient's wife is wondering when he will transferred to the [facility]  Due to blood infection.    Wife advised to talk to the Nurses upstairs for clarification and to get any numbers needed.    She agrees with the plan.  Genesis Ardon RN on 6/6/2023 at 1:54 PM

## 2023-06-06 NOTE — ED NOTES
Scheduled Rocephin infusing when PIV infiltrated. PIV removed by this RN and patient agreed to have a new one placed. This RN had one attempt but PIV went bad after flushing when placed. Pt insistent on leaving even if AMA. This RN request pt to talk with MD. MD spoke with patient and patient will talk about decision with his wife (who would like him to remain in ED and plan for transfer hopefully later today yet) and will update this RN once he has made his decision of leaving AMA or continuing with medical care.

## 2023-06-06 NOTE — TELEPHONE ENCOUNTER
FYI - Status Update    Who is Calling: nurse, RN AZIZA    Update: Patient is in the hospital with sepsis. Do to not complying with change dressing. If patient is going to be noncompliant after hospital discharge [facility] home care is discontinue services.     Does caller want a call/response back: No

## 2023-06-06 NOTE — ED NOTES
Writer received a call from Genesis, Clinical Nurse Manager at [facility] [phone] to state her department and  Home Pharmacy have completed a meeting about this patient's serious non-compliance with his medications and appointments. She reports serious safety concerns. She stated that it was decided that if this patient does leave AMA their companies will no longer provide his meds, TPA or nursing care. If patient is transferred to [facility], they will continue to work closely with the providers down there. Dr. Delgado and Jessica NAVARRO RN notified. Fifi Sofia RN

## 2023-06-06 NOTE — ED PROVIDER NOTES
I reassumed patient's care at change of shift.  Since he presented yesterday with a fever, his blood cultures from both peripheral blood and his line are growing out gram-negative bacilli and gram-positive cocci in pairs and chains.  His antibiotics were switched to Rocephin while continuing the vancomycin.    Will recheck his CBC this morning.  If his platelets are closer to normal, could restart his Lovenox.    Dr. Delgado reassumed his care at change of shift.     Collin Chahal MD  06/06/23 0611     yes yes

## 2023-06-06 NOTE — ED NOTES
Patient not receiving TPN, Diet orders placed per MD. This RN clarified with patient the need to stay in the emergency department. Pt verbalized understanding and agreed.

## 2023-06-06 NOTE — PROGRESS NOTES
Antimicrobial Stewardship Team Note    Antimicrobial Stewardship Program - A joint venture between [facility] and  Physicians to optimize antibiotic management.  NOT a formal consult - Restricted Antimicrobial Review     Patient: Parker Acevedo  MRN: [id number]  Allergies: Bactrim [sulfamethoxazole-trimethoprim], Penicillins, Ertapenem, Doxycycline, and Vancomycin    Brief Summary: Parker Acevedo is a 52-year-old male with PMHx significant for short gut syndrome complicated by malnutrition requiring TPN, recurrent bacteremia, chronic abdominal pain, and chronic DVTs (Lovenox) who presents to the ED for concerns of a fever. He was found to have polymicrobial (GNR and gpc pairs and chains) bacteremia.      History of Present Illness:  Patient reports severe body aches with fevers and chills. He suspects fever related to an infected port. Patient has had no or URI/urinary symptoms. Patient does not report increased abdominal pain from baseline.  He has a J-tube for venting purposes. PICC line in place (EPIC says placed 1/26/22). Port a cath removed 05/28/23. Of note the patient has had recent ED visits on 6/1 (laceration of right ear canal) and 5/28 (N/V). The patient reports he has been running fevers on and off for couple weeks, but it grew more constant over the last 2 days (PTA was 104.9  F). On admission patient's labs/vitals were WBC 6.9, procalcitonin 16.09, temperature 101.7, pulse 90, RR 20, /78, SpO2 95 (no supplemental oxygen given). Chest XR found no obvious focal pneumonia or pleural effusion. Initial peripheral blood cultures positive 3/4 bottles for gram negative bacilli and 3/4 bottles gram positive cocci in pairs and chains. Verigene GN Panel detected Klebsiella pneumoniae (with no resistance markers detected). Verigene Gram Positive panel came back as no pathogen detected. Of note, Verigene sensitivity and specificity does decrease if multiple bacteria present therefore  awaiting final culture and susceptibilities. Urine aerobic culture and today's peripheral blood culture in progress. UA found few bacteria, WBC 4, negative for leukocyte esterase and nitrites. Patient has allergies for Bactrim, penicillins, ertapenem, doxycycline, and vanco noted on EPIC. 10/10/2018 allergy assessment note states patient has received ceftriaxone and cefazolin and tolerated both.  He has tolerated both cefepime and ceftriaxone thus far this admission.  Vancomycin allergy states the patient had a rash while receiving 03/28/16, but tolerated it with slower infusion and diphenhydramine premed. The patient was started this admission empirically with cefepime and vancomycin 6/4.       Active Anti-infective Medications   (From admission, onward)                 Start     Stop    06/05/23 1400  cefTRIAXone  2 g,   Intravenous,   EVERY 24 HOURS        Bacteremia, Sepsis        --    06/04/23 2145  vancomycin (VANCOCIN) injection  2,000 mg,   Intravenous,   EVERY 18 HOURS        Sepsis        --                  Assessment: Polymicrobial bacteremia with unknown source (possible line associated vs gut translocation)  Patient is clinically stable with vitals temperature 97.6, pulse 81, RR 18, /89, and SpO2 99. WBC 8.8 yesterday. Gram positive pairs and chains likely to be ither Strep. or Enterococcus. Patient does have a prior enterococcus bacteremia that had AmpC associated resistance about a year ago, so agree with initial cefepime empiric therapy. Pseudomonas was not detected on Verigene and Klebsiella pneumoniae matches the gram-negative bacilli detected in peripheral blood cultures. Agree with de-escalation of cefepime to ceftriaxone. According to antibiogram Klebsiella pneumoniae at [facility] is 97% susceptible. Similar rates at [facility], 92%, where patient has been recently. If GP results return positive for Strep. and not Enterococcus, we can further de-escalate therapy by removing vancomycin (cephs  do not have enterococcus coverage). Agree with continued vancomycin use until culture susceptibilities return. Recommend continuing to assess line/port sites for signs of infection (erythema, drainage) for continued effort at source control. Central line removal or at least replacement should be considered in setting of bacteremia infection, particularly when no other primary source of infection has been identified. We have updated allergy sections the patient has been tolerating cefepime and ceftriaxone.      Recommendations:   - Continue ceftriaxone and vancomycin until susceptibilities return.  - Repeat a set of blood cultures to ensure clearance of the blood stream  - Consider line removal    Pharmacy took the following actions: Electronic Note Created, Sticky note reminder created.    Discussed with ID Staff Luisa Dominique MD, and Kristin Tanner, PharmD, BCIDP  Alex Cason, 2024 PharmD Candidate    Vital Signs/Clinical Features:  Vitals         06/04 0700  06/05 0659 06/05 0700  06/06 0659 06/06 0700  06/06 1408   Most Recent      Temp ( F) 97.8 -  101.7      98.6      97.6     97.6 (36.4) 06/06 0854    Pulse 70 -  102    74 -  85      81     81 06/06 0854    Resp 16 -  20    16 -  18      18     18 06/06 0854    BP 93/61 -  108/73    103/68 -  115/80      123/89     123/89 06/06 0854    SpO2 (%) 94 -  99    97 -  98      99     99 06/06 0854            Labs  Estimated Creatinine Clearance: 147.1 mL/min (based on SCr of 0.74 mg/dL).  Recent Labs   Lab Test 03/14/23  1327 04/11/23  1235 05/09/23  1230 05/28/23  1833 06/04/23 2047 06/05/23  0655   CR 0.67 0.77 0.64* 0.70 0.75 0.74       Recent Labs   Lab Test 05/18/21  1015 05/19/21  1342 05/25/21  1147 06/01/21  1117 06/09/21  1044 06/14/21  1017 06/29/21  1016 07/13/21  1110 04/11/23  1235 05/09/23  1230 05/28/23  1833 06/04/23 2047 06/05/23  0655 06/06/23  0702   WBC 6.2 6.1   < > 7.3 7.5 8.1 6.9   < > 6.5 7.6 7.2 6.9 10.8 8.8   ANEU 3.7 3.5  --   4.3 4.7 4.1 3.1  --   --   --   --   --   --   --    ALYM 1.8 1.7  --  1.9 1.7 2.5 2.7  --   --   --   --   --   --   --    DACIA 0.5 0.6  --  0.9 0.7 1.2 0.9  --   --   --   --   --   --   --    AEOS 0.2 0.2  --  0.1 0.3 0.2 0.2  --   --   --   --   --   --   --    HGB 12.6* 12.5*   < > 12.1* 13.3 12.0* 12.1*   < > 11.1* 12.4* 12.4* 10.5* 11.4* 11.8*   HCT 38.8* 38.5*   < > 36.3* 40.6 37.0* 37.4*   < > 35.3* 39.2* 38.2* 32.3* 35.8* 37.1*   MCV 98 97   < > 94 96 97 96   < > 96 97 91 94 95 95   * 159   < > 174 169 158 178   < > 199 188 199 91* 111* 134*    < > = values in this interval not displayed.       Recent Labs   Lab Test 02/21/23  1045 03/14/23  1327 04/11/23  1235 05/09/23  1230 05/28/23  1833 06/04/23  2047   BILITOTAL 0.3 0.3 0.2 0.3 0.4 0.3   ALKPHOS 67 75 72 90 64 74   ALBUMIN 3.7 3.6 3.9 3.9 3.8 3.1*   AST 18 17 22 17 19 28   ALT 17 23 25 31 20 25       Recent Labs   Lab Test 09/21/17  0653 09/22/17  0821 01/20/18  1030 01/23/18  0845 02/12/18  1400 06/01/18  1807 06/02/18  0110 05/14/19  1145 05/23/19  1606 07/28/20  1607 07/28/20  1723 03/19/21  1643 03/21/21  0734 01/08/22  1430 01/08/22  1828 02/23/22  1621 02/23/22  2222 03/15/22  1442 05/07/22  1423 05/07/22  1424 07/07/22  1231 07/07/22  1852 07/08/22  1049 07/16/22  2216 10/04/22  1440 01/21/23  0749 05/09/23  1230 06/04/23 2047   PCAL 0.08  --   --   --   --  0.09  --   --   --   --   --  0.06  --   --   --   --   --   --  0.20*  --  0.56*  --   --   --   --   --   --  16.09*   LACT  --    < >  --   --   --  0.9   < >  --    < > 1.6   < > 0.9  --   --    < >  --    < >  --   --  0.7  --  1.1 1.0 0.6*  --  0.8  --  1.0   CRP 88.0*   < > 5.4 <2.9   < > 26.0*   < >  --    < > <2.9   < >  --    < > 52.0*  --  <2.9  --  <2.9 34.0*  --   --   --   --  4.4 <2.9  --   --   --    CRPI  --   --   --   --   --   --   --   --   --   --   --   --   --   --   --   --   --   --   --   --  47.70*  --   --   --   --   --  <3.00  --    SED  --    < > 11  10  --   --   --  13  --  10  --   --   --   --   --  10  --   --   --   --   --   --   --  18*  --   --   --   --     < > = values in this interval not displayed.       Recent Labs   Lab Test 07/21/22  1030   VANCOMYCIN 10.5       Culture Results:  7-Day Micro Results       Procedure Component Value Units Date/Time    Urine Culture Aerobic Bacterial [[id number]] Collected: 06/05/23 0819    Order Status: Completed Lab Status: Final result Updated: 06/06/23 1323    Specimen: Urine, Midstream      Culture No Growth    Blood Culture Line, venous [[id number]]  (Abnormal) Collected: 06/04/23 2105    Order Status: Completed Lab Status: Preliminary result Updated: 06/06/23 1315    Specimen: Blood from Line, venous      Culture Positive on the 1st day of incubation      Klebsiella pneumoniae     Comment: 1 of 2 bottlesSusceptibilities done on previous culturesIdentification is preliminary, confirmation in progress         Enterococcus faecalis     Comment: 1 of 2 bottlesSusceptibilities done on previous cultures         Klebsiella oxytoca     Comment: 1 of 2 bottlesSusceptibilities done on previous culturesIdentification is preliminary, confirmation in progress       Blood Culture Peripheral Blood [[id number]]  (Abnormal) Collected: 06/04/23 2047    Order Status: Completed Lab Status: Preliminary result Updated: 06/06/23 1313    Specimen: Peripheral Blood      Culture Positive on the 1st day of incubation      Gram negative bacilli     Comment: 2 of 2 bottles         Enterococcus faecalis     Comment: 2 of 2 bottles         Klebsiella oxytoca     Comment: 2 of 2 bottlesIdentification is preliminary, confirmation in progress       Verigene GN Panel [[id number]]  (Abnormal) Collected: 06/04/23 2047    Order Status: Completed Lab Status: Final result Updated: 06/05/23 1100    Specimen: Peripheral Blood      Acinetobacter species Not Detected     Citrobacter species Not Detected     Enterobacter species Not Detected      Proteus species Not Detected     Escherichia coli Not Detected     Klebsiella pneumoniae Detected     Comment: Positive for Klebsiella pneumoniae by Verigene multiplex nucleic acid test. Final identification and antimicrobial susceptibility testing will be verified by standard methods.        Klebsiella oxytoca Not Detected     Pseudomonas aeruginosa Not Detected     CTX-M Not Detected     KPC Not Detected     NDM Not Detected     VIM Not Detected     IMP Not Detected     OXA Not Detected    Narrative:      Specimen tested with Verigene multiplex, gram-negative blood culture nucleic acid test for the following targets: Acinetobacter species, Citrobacter species, Enterobacter species, Proteus species, Escherichia coli, Klebsiella pneumoniae, Klebsiella oxytoca, Pseudomonas aeruginosa, and the following resistance markers: CTX-M, KPC, NDM, VIM, IMP and OXA.    [employer] GP Panel [[id number]]  (Normal) Collected: 06/04/23 2047    Order Status: Completed Lab Status: Final result Updated: 06/05/23 1129    Specimen: Peripheral Blood      Staphylococcus species Not Detected     Staphylococcus aureus Not Detected     Staphylococcus epidermidis Not Detected     Staphylococcus lugdunensis Not Detected     Enterococcus faecalis Not Detected     Enterococcus faecium Not Detected     Streptococcus species Not Detected     Streptococcus agalactiae Not Detected     Streptococcus anginosus group Not Detected     Streptococcus pneumoniae Not Detected     Streptococcus pyogenes Not Detected     Listeria species Not Detected    Narrative:      Specimen tested with Verigene multiplex, gram-positive blood culture nucleic acid test for the following targets: Staphylococcus aureus, Staphylococcus epidermidis, Staphylococcus lugdunensis, other Staphylococcus species, Enterococcus faecalis, Enterococcus faecium, Streptococcus species, Streptococcus agalactiae, Streptococcus anginosus group, Streptococcus pneumoniae, Streptococcus pyogenes,  Listeria species, mecA (methicillin resistance), and Melvin/vanB (vancomycin resistance).  Final identification and antimicrobial susceptibility testing will be verified by standard methods.      Blood Culture Line, venous [[id number]]     Order Status: Canceled Lab Status: No result     Specimen: Blood from Line, venous             Recent Labs   Lab Test 05/07/22  1520 07/07/22  2223 07/16/22  2200 01/21/23  0916 06/05/23  0820   URINEPH 6.5 6.0 8.0* 6.5 5.0   NITRITE Negative Negative Negative Negative Negative   LEUKEST Negative Negative Negative Negative Negative   WBCU 0 2 1 0 4             Recent Labs   Lab Test 01/08/22  1858   IFLUA Not Detected   FLUAH1 Not Detected   FLUAH3 Not Detected   PV8973 Not Detected   IFLUB Not Detected   RSVA Not Detected   RSVB Not Detected   PIV1 Not Detected   PIV2 Not Detected   PIV3 Not Detected   HMPV Not Detected             Imaging: XR Chest 2 Views    Result Date: 6/4/2023  EXAM: XR CHEST 2 VIEWS LOCATION: [facility] DATE/TIME: 6/4/2023 9:25 PM CDT INDICATION: Fever COMPARISON: 01/21/2023     IMPRESSION: No obvious focal pneumonia. No pleural effusion. Normal heart size. Stable left central catheter tip at the distal SVC.

## 2023-06-06 NOTE — ED PROVIDER NOTES
52-year-old male signed out to me at change of shift from Dr. Delgado.  Patient has been here for almost 48 hours secondary to awaiting transfer to [facility] for bacteremia.  This is felt to be related to his port.  He gets TPN through his port and has had repetitive clots.  Apparently the port needs to be tunneled to be removed because of the clots.  Procalcitonin was markedly elevated.  Blood cultures have come back positive for Klebsiella and Enterococcus faecalis.  Initially was on cefepime and vancomycin but that was switched to ceftriaxone and vancomycin after pharmacy reviewed the case.  He has had an IV placed by ultrasound which was then later lost.  Anesthesia apparently is currently putting an IV in.  Vital signs have been reassuring and lactate was negative.  He has been accepted to [facility] but we have not been able to obtain an open bed as of yet.  He has been apparently threatening to leave AGAINST MEDICAL ADVICE.    Still no bed availability at the [facility].  I had some discussions with the nursing staff about options.  One option would be for him to go down to the ER if the emergency department get the procedure by IR with the Cm removal followed by placement of a new line that could be used for outpatient infusion.  At this point the patient is improved from what I can tell and vital signs are stable.  If he has a new line he could then be set up for outpatient infusions here.    Signed back over to Dr. Chahal at change of shift.      Francis Llamas MD  06/06/23 1653

## 2023-06-06 NOTE — ED PROVIDER NOTES
Patient was signed out to me at change of shift by Dr. Basurto, please see his note for any issues overnight.  During my shift today, patient remained stable but his IV did infiltrate towards the end of my shift.  We had anesthesia, and place a new IV.  Once again patient threatened to leave AMA again.  Home care infusion called as they have been following the patient's care while he has been boarding here.  They called to state that if patient does leave AGAINST MEDICAL ADVICE, they are not going to follow him anymore as an outpatient.  They had some concerns about noncompliance and other issues.  They are stating that if he does get transferred they will work with the [facility] on appropriate care as an outpatient.  I told this to the patient when he thought about leaving AMA and patient was able to be convinced to stay by family after this and will continue to wait for bed placement.  The hope was that placement would happen today but it looks like we are still waiting as they still do not have any beds.  Patient will be signed out at change of shift pending still disposition.     Oseas Delgado MD  06/06/23 3502

## 2023-06-06 NOTE — PROGRESS NOTES
[facility] patient presented to [facility] ER due to fever. Multidisciplinary Safety Huddle completed due to non adherence to signed care agreement.     Patient has not been seen for Cm repair/replacement. See Radiology note from 5/18/23.    I discussed plan if patient leaves AMA or is directed by staff to leave ER setting. [facility] team agreed that there is a significant safety risk  to continue providing infusion services and nursing for home therapy if this were to occur.    Patient needs to follow medical advice as outlined in his care agreement with [facility].    Genesis Mariee RN BSN LEONOR  Clinical Nurse Manager  [facility]  [facility], [facility].  [address] [place] [id number]  [email or web address]  [email or web address]   Office: [phone]  Cell: [phone]  Fax [phone]

## 2023-06-06 NOTE — CONSULTS
Called to the ED to replace a peripheral IV due to infiltration of the previous IV. Known difficult IV placement with multiple attempts in the past. Necessary for IV antibiotics.     20g placed in right hand and secured well with tape. Report to CHRISTY Lopez.     Jenna Mckenzie, SAILAJA CRNA on 6/6/2023 at 5:30 PM

## 2023-06-06 NOTE — TELEPHONE ENCOUNTER
Lm and advised wife to call [facility] and find out when patient will be transferred as we are located in clinic and do not have that information.

## 2023-06-07 ENCOUNTER — TELEPHONE (OUTPATIENT)
Dept: INTERNAL MEDICINE | Facility: CLINIC | Age: 53
End: 2023-06-07

## 2023-06-07 ENCOUNTER — APPOINTMENT (OUTPATIENT)
Dept: INTERVENTIONAL RADIOLOGY/VASCULAR | Facility: CLINIC | Age: 53
End: 2023-06-07
Attending: NURSE PRACTITIONER
Payer: COMMERCIAL

## 2023-06-07 ENCOUNTER — APPOINTMENT (OUTPATIENT)
Dept: MEDSURG UNIT | Facility: CLINIC | Age: 53
End: 2023-06-07
Attending: RADIOLOGY
Payer: COMMERCIAL

## 2023-06-07 ENCOUNTER — HOSPITAL ENCOUNTER (OUTPATIENT)
Facility: CLINIC | Age: 53
Discharge: HOME | End: 2023-06-07
Attending: RADIOLOGY | Admitting: PHYSICIAN ASSISTANT
Payer: COMMERCIAL

## 2023-06-07 VITALS
HEIGHT: 71 IN | OXYGEN SATURATION: 100 % | TEMPERATURE: 97.7 F | HEART RATE: 72 BPM | DIASTOLIC BLOOD PRESSURE: 91 MMHG | SYSTOLIC BLOOD PRESSURE: 122 MMHG | WEIGHT: 192 LBS | RESPIRATION RATE: 18 BRPM | BODY MASS INDEX: 26.88 KG/M2

## 2023-06-07 VITALS
RESPIRATION RATE: 16 BRPM | SYSTOLIC BLOOD PRESSURE: 115 MMHG | HEIGHT: 72 IN | BODY MASS INDEX: 26.19 KG/M2 | HEART RATE: 83 BPM | DIASTOLIC BLOOD PRESSURE: 85 MMHG | WEIGHT: 193.4 LBS | OXYGEN SATURATION: 100 % | TEMPERATURE: 98.2 F

## 2023-06-07 DIAGNOSIS — R78.81 GRAM-POSITIVE BACTEREMIA: Primary | ICD-10-CM

## 2023-06-07 DIAGNOSIS — T80.211A BLOODSTREAM INFECTION ASSOCIATED WITH CENTRAL VENOUS CATHETER, INITIAL ENCOUNTER: ICD-10-CM

## 2023-06-07 DIAGNOSIS — R50.9 FEVER, UNKNOWN ORIGIN: ICD-10-CM

## 2023-06-07 LAB
BACTERIA BLD CULT: ABNORMAL
INR BLD: 1.2 (ref 2–3)

## 2023-06-07 PROCEDURE — 96365 THER/PROPH/DIAG IV INF INIT: CPT | Mod: 59 | Performed by: PHYSICIAN ASSISTANT

## 2023-06-07 PROCEDURE — 36573 INSJ PICC RS&I 5 YR+: CPT | Mod: GC | Performed by: RADIOLOGY

## 2023-06-07 PROCEDURE — 272N000504 HC NEEDLE CR4

## 2023-06-07 PROCEDURE — C1751 CATH, INF, PER/CENT/MIDLINE: HCPCS

## 2023-06-07 PROCEDURE — 96372 THER/PROPH/DIAG INJ SC/IM: CPT | Performed by: FAMILY MEDICINE

## 2023-06-07 PROCEDURE — 250N000011 HC RX IP 250 OP 636: Performed by: EMERGENCY MEDICINE

## 2023-06-07 PROCEDURE — 258N000003 HC RX IP 258 OP 636: Performed by: NURSE PRACTITIONER

## 2023-06-07 PROCEDURE — 250N000011 HC RX IP 250 OP 636: Performed by: RADIOLOGY

## 2023-06-07 PROCEDURE — 85610 PROTHROMBIN TIME: CPT

## 2023-06-07 PROCEDURE — 250N000013 HC RX MED GY IP 250 OP 250 PS 637: Performed by: FAMILY MEDICINE

## 2023-06-07 PROCEDURE — 250N000011 HC RX IP 250 OP 636: Performed by: FAMILY MEDICINE

## 2023-06-07 PROCEDURE — 999N000128 HC STATISTIC PERIPHERAL IV START W/O US GUIDANCE

## 2023-06-07 PROCEDURE — 36589 REMOVAL TUNNELED CV CATH: CPT

## 2023-06-07 PROCEDURE — 258N000003 HC RX IP 258 OP 636: Performed by: EMERGENCY MEDICINE

## 2023-06-07 PROCEDURE — 36589 REMOVAL TUNNELED CV CATH: CPT | Mod: GC | Performed by: RADIOLOGY

## 2023-06-07 PROCEDURE — 250N000013 HC RX MED GY IP 250 OP 250 PS 637: Performed by: PHYSICIAN ASSISTANT

## 2023-06-07 PROCEDURE — 96366 THER/PROPH/DIAG IV INF ADDON: CPT | Mod: 59 | Performed by: PHYSICIAN ASSISTANT

## 2023-06-07 PROCEDURE — 999N000132 HC STATISTIC PP CARE STAGE 1

## 2023-06-07 PROCEDURE — 999N000142 HC STATISTIC PROCEDURE PREP ONLY

## 2023-06-07 PROCEDURE — 99152 MOD SED SAME PHYS/QHP 5/>YRS: CPT

## 2023-06-07 RX ORDER — NALOXONE HYDROCHLORIDE 0.4 MG/ML
0.4 INJECTION, SOLUTION INTRAMUSCULAR; INTRAVENOUS; SUBCUTANEOUS
Status: DISCONTINUED | OUTPATIENT
Start: 2023-06-07 | End: 2023-06-07 | Stop reason: HOSPADM

## 2023-06-07 RX ORDER — NALOXONE HYDROCHLORIDE 0.4 MG/ML
0.2 INJECTION, SOLUTION INTRAMUSCULAR; INTRAVENOUS; SUBCUTANEOUS
Status: DISCONTINUED | OUTPATIENT
Start: 2023-06-07 | End: 2023-06-07 | Stop reason: HOSPADM

## 2023-06-07 RX ORDER — FLUMAZENIL 0.1 MG/ML
0.2 INJECTION, SOLUTION INTRAVENOUS
Status: DISCONTINUED | OUTPATIENT
Start: 2023-06-07 | End: 2023-06-07 | Stop reason: HOSPADM

## 2023-06-07 RX ORDER — DIPHENHYDRAMINE HYDROCHLORIDE 50 MG/ML
25 INJECTION INTRAMUSCULAR; INTRAVENOUS EVERY 12 HOURS
Status: CANCELLED
Start: 2023-06-07

## 2023-06-07 RX ORDER — HEPARIN SODIUM,PORCINE 10 UNIT/ML
5-20 VIAL (ML) INTRAVENOUS EVERY 24 HOURS
Status: DISCONTINUED | OUTPATIENT
Start: 2023-06-07 | End: 2023-06-07 | Stop reason: HOSPADM

## 2023-06-07 RX ORDER — CEFTRIAXONE 2 G/1
2 INJECTION, POWDER, FOR SOLUTION INTRAMUSCULAR; INTRAVENOUS EVERY 24 HOURS
Status: CANCELLED
Start: 2023-06-07

## 2023-06-07 RX ORDER — SODIUM CHLORIDE 9 MG/ML
INJECTION, SOLUTION INTRAVENOUS CONTINUOUS
Status: DISCONTINUED | OUTPATIENT
Start: 2023-06-07 | End: 2023-06-07 | Stop reason: HOSPADM

## 2023-06-07 RX ORDER — NICOTINE 21 MG/24HR
1 PATCH, TRANSDERMAL 24 HOURS TRANSDERMAL DAILY
Status: DISCONTINUED | OUTPATIENT
Start: 2023-06-07 | End: 2023-06-07 | Stop reason: HOSPADM

## 2023-06-07 RX ORDER — FENTANYL CITRATE 50 UG/ML
25-50 INJECTION, SOLUTION INTRAMUSCULAR; INTRAVENOUS EVERY 5 MIN PRN
Status: DISCONTINUED | OUTPATIENT
Start: 2023-06-07 | End: 2023-06-07 | Stop reason: HOSPADM

## 2023-06-07 RX ORDER — HEPARIN SODIUM,PORCINE 10 UNIT/ML
5-20 VIAL (ML) INTRAVENOUS
Status: DISCONTINUED | OUTPATIENT
Start: 2023-06-07 | End: 2023-06-07 | Stop reason: HOSPADM

## 2023-06-07 RX ORDER — HEPARIN SODIUM,PORCINE 10 UNIT/ML
5 VIAL (ML) INTRAVENOUS
Status: COMPLETED | OUTPATIENT
Start: 2023-06-07 | End: 2023-06-07

## 2023-06-07 RX ORDER — CEFAZOLIN SODIUM 1 G/50ML
2000 SOLUTION INTRAVENOUS EVERY 12 HOURS
Status: CANCELLED
Start: 2023-06-07

## 2023-06-07 RX ORDER — LIDOCAINE 40 MG/G
CREAM TOPICAL
Status: DISCONTINUED | OUTPATIENT
Start: 2023-06-07 | End: 2023-06-07 | Stop reason: HOSPADM

## 2023-06-07 RX ADMIN — OXYCODONE HYDROCHLORIDE 10 MG: 5 SOLUTION ORAL at 09:08

## 2023-06-07 RX ADMIN — MIDAZOLAM 1 MG: 1 INJECTION INTRAMUSCULAR; INTRAVENOUS at 15:11

## 2023-06-07 RX ADMIN — CARVEDILOL 12.5 MG: 6.25 TABLET, FILM COATED ORAL at 09:05

## 2023-06-07 RX ADMIN — PANTOPRAZOLE SODIUM 40 MG: 40 TABLET, DELAYED RELEASE ORAL at 09:05

## 2023-06-07 RX ADMIN — Medication 5 ML: at 15:42

## 2023-06-07 RX ADMIN — FENTANYL CITRATE 50 MCG: 50 INJECTION, SOLUTION INTRAMUSCULAR; INTRAVENOUS at 15:11

## 2023-06-07 RX ADMIN — FENTANYL CITRATE 50 MCG: 50 INJECTION, SOLUTION INTRAMUSCULAR; INTRAVENOUS at 15:45

## 2023-06-07 RX ADMIN — FENTANYL CITRATE 50 MCG: 50 INJECTION, SOLUTION INTRAMUSCULAR; INTRAVENOUS at 15:35

## 2023-06-07 RX ADMIN — DIPHENHYDRAMINE HYDROCHLORIDE 25 MG: 50 INJECTION, SOLUTION INTRAMUSCULAR; INTRAVENOUS at 09:01

## 2023-06-07 RX ADMIN — VANCOMYCIN HYDROCHLORIDE 2000 MG: 1 INJECTION, POWDER, LYOPHILIZED, FOR SOLUTION INTRAVENOUS at 00:34

## 2023-06-07 RX ADMIN — MIDAZOLAM 1 MG: 1 INJECTION INTRAMUSCULAR; INTRAVENOUS at 15:24

## 2023-06-07 RX ADMIN — FENTANYL CITRATE 50 MCG: 50 INJECTION, SOLUTION INTRAMUSCULAR; INTRAVENOUS at 15:24

## 2023-06-07 RX ADMIN — SODIUM CHLORIDE: 9 INJECTION, SOLUTION INTRAVENOUS at 14:05

## 2023-06-07 RX ADMIN — NICOTINE 1 PATCH: 21 PATCH, EXTENDED RELEASE TRANSDERMAL at 01:21

## 2023-06-07 RX ADMIN — NYSTATIN: 100000 CREAM TOPICAL at 10:23

## 2023-06-07 RX ADMIN — ENOXAPARIN SODIUM 80 MG: 80 INJECTION SUBCUTANEOUS at 09:06

## 2023-06-07 RX ADMIN — VANCOMYCIN HYDROCHLORIDE 2000 MG: 1 INJECTION, POWDER, LYOPHILIZED, FOR SOLUTION INTRAVENOUS at 08:59

## 2023-06-07 RX ADMIN — DIPHENHYDRAMINE HYDROCHLORIDE 25 MG: 50 INJECTION, SOLUTION INTRAMUSCULAR; INTRAVENOUS at 00:34

## 2023-06-07 ASSESSMENT — ACTIVITIES OF DAILY LIVING (ADL)
ADLS_ACUITY_SCORE: 37

## 2023-06-07 NOTE — DISCHARGE INSTRUCTIONS
PICC Line Care  PICC stands for peripherally inserted central catheter. This is a short-term (temporary) tube that's used instead of a regular IV (intravenous) line.    Reasons for using a PICC line  A PICC line may be used because:   It reduces the discomfort of putting in a new IV every time one is needed.  Medicine or nutrition needs to be given over a period of weeks or even months.  A PICC can stay in place longer than an IV, so it reduces needle sticks.  It reduces damage to small veins, where an IV is normally inserted. This can allow some substances that damage small veins to be infused safely and comfortably.  A PICC may have more than one channel, so different fluids or medicines can be given at the same time.  A PICC line allows for home therapy.  Your PICC will need some care to keep it clean and working. This care includes:   Changing the bandage (dressing)  Flushing the catheter with fluids  Changing the cap on the end of the catheter  A nurse or other healthcare provider will teach you how to do each of these things. If you have any questions, contact your healthcare team.     Home care  The following are general care guidelines that will help you care for your PICC line at home:   You can use your arm. But avoid any activity that causes mild pain.  Don't pick at it or pull on the tubing.  Don t lift anything heavier than 10 pounds with the arm on the side of the PICC line.  The PICC line and dressing can't get wet. When you bathe or shower, tape plastic wrap over the site to keep it dry.  Don't put the PICC site under water. No swimming or hot tubs. If the dressing gets wet, change it right away if you've been trained to do so. If not, call your healthcare team.  Always wash your hands with soap and clean, running water before and after touching any part of your PICC.  Don't allow the tubing to hang freely. Make sure to keep the tubing covered and secured to your arm to prevent the PICC line from  being pulled out by accident.  The following tips will help you with dressing changes:   Change the dressing over the site as directed. This is usually once a week. Change it sooner if the dressing gets wet or soiled. Check the dressing daily.  You or a family member may be able to do the dressing change at home. Or you may be instructed to return to the office or clinic for dressing changes.  Sterile technique must be used for PICC dressing change. If your dressing is changed at home, be sure you or your family member knows sterile dressing technique. Call your healthcare provider for instructions if you need them.  Follow-up care  Follow up as advised by your healthcare provider.   When to get medical care  Call your healthcare provider right away if any of these occur:   Fever of 100.4 F (38 C) or higher, or as advised by your provider  Drainage from the PICC site  Swelling or bulging around the PICC site, or anywhere above the insertion site  Bleeding from the PICC site  Skin pulling away from the PICC site  Redness, warmth, or pus at the PICC site  Tubing breaks, splits, or leaks  More exposed tubing (tubing seems longer) or the tubing is pulled out completely  Medicine or fluids don't drain from the bag into your PICC  [employer] last reviewed this educational content on 5/1/2022 2000-2022 [employer]. All rights reserved. This information is not intended as a substitute for professional medical care. Always follow your healthcare professional's instructions.  [facility]  Going Home after Sedation    For 24 hours:  An adult should stay with you.  Relax and take it easy.  DO NOT make any important legal decisions.  DO NOT drive or operate machines at home or at work.  Resume your regular diet and drink plenty of fluids.    CALL THE PHYSICIAN IF:  You develop nausea or vomiting  You develop hives or a rash or any unexplained itching    [facility] INTERVENTIONAL RADIOLOGY DEPARTMENT         Procedure Physician:    Dr Baldwin      Date of procedure:   June 7, 2023        Telephone numbers:     [phone]      Monday-Friday 7:30 am to 4:00 pm                                              [phone]       After 4:00 pm Monday-Friday, Weekends & Holidays.                                         Ask for the Interventional Radiologist on call. Someone is  available 24 hours/day        [facility] toll free number: [phone]  Monday-Friday 8:00 am to 4:30 pm

## 2023-06-07 NOTE — ED PROVIDER NOTES
Patient was signed out at shift change from Dr. Jose Luis Basurto.  I did receive a phone call from interventional radiology at [facility].  They have coordinated an appointment today at 1230.  He will be discharged in the emergency department at [facility] with a diagnosis of sepsis requiring additional IV antibiotics.  He was scheduled to have the central line removed and a PICC line placed at 1230 today.  He was made aware that needs to go to the [facility] waiting room on the [address] at the [facility] to meet with IR.  His wife is coming in to drive him down.  He indicated that he would be compliant.  I contacted home caring to verify that he had orders for continued coverage of sepsis related to both Klebsiella and Enterococcus.  I was told that he already had orders completed.  Verify that he did not have orders completed and placed on outpatient therapy orders after contacting pharmacy.  Verify that he is on vancomycin 2000 mg IV every 12 hours the next dose due at 9 PM.  In addition he is on Rocephin 2 g every 24 hours.  Orders were placed.  I spoke with home caring who said they would call me back if they cannot find the orders to initiate.     Francis Mir, DO  06/07/23 0964

## 2023-06-07 NOTE — TELEPHONE ENCOUNTER
FYI:    Received a call from [facility] regarding patient needing Cm removed and having PICC placed.    Patient had a VA filed on him today from his home care agency due to being non-compliant with home care visits and refusing to go in to ED when needed and now he has sepsis.     Upon return from hospital, patient is going to have to sign a contract to continue his services through [facility] and will have to comply to having the dressing changes done as ordered or they will not be able to continue to see him.     Angel Thomas,YADIRAN, RN

## 2023-06-07 NOTE — TELEPHONE ENCOUNTER
Parker Haynes Dr. was discharged today from the ED at [facility] with the plan for tunneled catheter removal and new PICC line placement at [facility] today.     IV vancomycin and IV ceftriaxone were both ordered x7days, however no provider responsible for the ambulatory IV abx was indicated and no follow up plan for final length of therapy indicated.  The hospital team requested repeat blood cultures be collected today with PICC line placement, however usually final length of IV abx therapy is determined by date of clear cultures and central lines are usually held until BCx are clear for at least 48hrs.      Are willing to be the overseeing physician for his IV abx therapy?     Also, the hospital did not write orders to resume his TPN or IV fluid infusions.  Are you willing to approve this as well?    Thank you!    Jyothi Peraza, PharmD [facility]   Ph: [phone]  Fax: [phone]

## 2023-06-07 NOTE — ED NOTES
Patient has multiple supplies from the room in his personal belongings including the IV port caps and the pressure bag from the IV pole. These items were returned to their proper place.

## 2023-06-07 NOTE — ED NOTES
Talked with Yomi from Pharmacy. He wanted to make sure repeat blood cultures would be done. Dr. Mir confirmed they would culture he time of his Cm when removed at the

## 2023-06-07 NOTE — TELEPHONE ENCOUNTER
Yes I am ok to oversee the antibiotics.   Restart the TPN and IV fluids.  Do a blood culture on day 4 of the 7 to see if negative  Before stopping, that is ordered.

## 2023-06-07 NOTE — PROGRESS NOTES
Pt arrived on 2A, room 6 for left chest CVC tunnel line removal and picc placement. Vitals stable. Prep complete. Wife Rose in gold waiting will take pt home.

## 2023-06-07 NOTE — PRE-PROCEDURE
GENERAL PRE-PROCEDURE:   Procedure:  PICC placement  Date/Time:  6/7/2023 1:37 PM    Verbal consent obtained?: Yes    Written consent obtained?: Yes    Risks and benefits: Risks, benefits and alternatives were discussed    Consent given by:  Patient  Patient states understanding of procedure being performed: Yes    Patient's understanding of procedure matches consent: Yes    Procedure consent matches procedure scheduled: Yes    Expected level of sedation:  Moderate  Appropriately NPO:  Yes  Mallampati  :  Grade 3- soft palate visible, posterior pharyngeal wall not visible  Lungs:  Lungs clear with good breath sounds bilaterally  Heart:  Normal heart sounds and rate  History & Physical reviewed:  History and physical reviewed and no updates needed  Statement of review:  I have reviewed the lab findings, diagnostic data, medications, and the plan for sedation

## 2023-06-07 NOTE — ED PROVIDER NOTES
Patient signed out at shift change from Dr. Basurto.  I spoke with interventional radiology at the [facility].  They are planning to see him as an outpatient today at 1230 to remove his Cm catheter and place a PICC line.  His home care team has been notified to continue IV antibiotics for Klebsiella and Enterococcus.  Patient knows to go to the [facility] waiting room at the [facility].  His wife is going to drive him.  He states that he will follow-up for removal of the line by going down there today.  He also states he will contact the home care team to make sure that they have got the antibiotics set up for home infusion.     Francis Mir, DO  06/07/23 0812

## 2023-06-07 NOTE — PROGRESS NOTES
Per alex from Pharm D (Jyothi Peraza) pt was to have blood cultures and tip cultured with line change; per Dr LEIA Baldwin cultures were not drawn and tip was not cultured. Pt reports cultures were sent before he left [facility] those cultures not visible in Epic. Pt reports he asked Dr in [place] to culture tip. Per PATRICA Peraza she contacted DR Mir from [facility] ED; he was okay without cultures today; pt okay to discharge to home. (Cultures were not done in IR--no orders in epic for cultures; Old line already disposed of.)    Pt up walking, steady; Site remains dry and intact; IV discontinued, catheter intact. Tolerated PO, food and drink. Voided. Discharge teaching done, stated understanding, copy to pt.1700--discharge to self care with wife; pt reports has all belongings.

## 2023-06-07 NOTE — PROGRESS NOTES
IR at [facility] on-call pager    Call to IR pager regarding this patient. Reportedly there was pending transfer for this patient x60 hrs. Pt with [facility] TCVC for TPN found to have bacteremia. Needs line removed and PICC line placed for IV antibiotics and TPN.     Case discussed with Dr. Fong from IR and Dr. Mir from ED. Pt will be discharged from [facility] ED and present to IR as an outpatient for [facility] TCVC removal. He will need some access for IV antibiotics and ideally ongoing TPN. Pt should remain NPO and will need a  to be a candidate for sedation. PICC lines have historically been challenging in this patient and it may not be possible.     Orders were placed and IR scheduling will coordinate with [facility] ED regarding appt times. (6/7, 12:30 pm check in for 2 pm appt).    Patti Garvey DNP, APRN  Interventional Radiology   IR on-call pager: [phone]

## 2023-06-07 NOTE — PHARMACY-VANCOMYCIN DOSING SERVICE
Pharmacy Vancomycin Note  Date of Service 2023  Patient's  1970   52 year old, male    Indication: Bacteremia  Day of Therapy: 4  Current vancomycin regimen:  2000 mg IV q18h  Current vancomycin monitoring method: AUC  Current vancomycin therapeutic monitoring goal: 400-600 mg*h/L    [employer] Prediction of Current Vancomycin Regimen  Regimen: 2000 mg IV every 18 hours.  Start time: 03:45 on 2023  Exposure target: AUC24 (range)400-600 mg/L.hr   AUC24,ss: 359 mg/L.hr  Probability of AUC24 > 400: 28 %  Ctrough,ss: 6.3 mg/L  Probability of Ctrough,ss > 20: 0 %  Probability of nephrotoxicity (Lodise CARMELA ): 4 %    Current estimated CrCl = Estimated Creatinine Clearance: 172.8 mL/min (A) (based on SCr of 0.63 mg/dL (L)).    Creatinine for last 3 days  2023:  8:47 PM Creatinine 0.75 mg/dL  2023:  6:55 AM Creatinine 0.74 mg/dL  2023: 11:13 PM Creatinine 0.63 mg/dL    Recent Vancomycin Levels (past 3 days)  2023: 11:13 PM Vancomycin 6.7 ug/mL    Vancomycin IV Administrations (past 72 hours)                   vancomycin (VANCOCIN) 2,000 mg in sodium chloride 0.9 % 500 mL intermittent infusion (mg) 2,000 mg New Bag 23 0945     2,000 mg New Bag 23 1617     2,000 mg New Bag 23 2255                Nephrotoxins and other renal medications (From now, onward)    Start     Dose/Rate Route Frequency Ordered Stop    23 0000  vancomycin (VANCOCIN) 2,000 mg in sodium chloride 0.9 % 500 mL intermittent infusion         2,000 mg  over 2 Hours Intravenous EVERY 12 HOURS 23 2358               Contrast Orders - past 72 hours (72h ago, onward)    None          Interpretation of levels and current regimen:  Vancomycin level is reflective of AUC less than 400    Has serum creatinine changed greater than 50% in last 72 hours: No    Urine output:  good urine output    Renal Function: Stable    [employer] Prediction of Planned New Vancomycin Regimen  Regimen: 2000 mg IV every 12  hours.  Start time: 03:45 on 06/07/2023  Exposure target: AUC24 (range)400-600 mg/L.hr   AUC24,ss: 539 mg/L.hr  Probability of AUC24 > 400: 94 %  Ctrough,ss: 12.2 mg/L  Probability of Ctrough,ss > 20: 3 %  Probability of nephrotoxicity (Lodise CARMELA 2009): 7 %    Plan:  1. Increase Dose to 2000 mg IV every 12 hours  2. Vancomycin monitoring method: AUC  3. Vancomycin therapeutic monitoring goal: 400-600 mg*h/L  4. Pharmacy will check vancomycin levels as appropriate in 3-5 Days.  5. Serum creatinine levels will be ordered daily for the first week of therapy and at least twice weekly for subsequent weeks.    Dilip Rey, [facility]  June 7, 2023

## 2023-06-07 NOTE — ED PROVIDER NOTES
I reassumed patient's care at change of shift.  He remains on IV Rocephin and vancomycin for an infected Cm.  Still waiting for a bed to become available to transfer him down to [facility] for removal.    Nursing staff was in contact with the SOC and they suggested calling back in the morning after 8:30 or 9 AM to get a consult with interventional radiology.  It is hoped that he could go down there, have his Cm removed, have a PICC line placed and could be discharged home since his infection is being held at bay with his IV antibiotics which he could receive as an outpatient.   He needs to get that infected line out as it is almost certainly the source of his infection.  Dr. Mir will assume his care at change of shift.         Collin Chahal MD  06/07/23 0719

## 2023-06-07 NOTE — PROGRESS NOTES
Pt on 2A per litter with RN post tunneled line removal left chest and picc placed left upper arm; , pt awake and alert, reports soreness at picc site states is tolerable. Site at left chest  is flat, dry and intact; scant shadow marked. Family at bedside; family updated. Pt taking clears without problem. Will continue to monitor.

## 2023-06-07 NOTE — PROCEDURES
[facility]    Procedure: IR Procedure Note    Date/Time: 6/7/2023 3:58 PM    Performed by: Deo Baldwin MD  Authorized by: Shanae Richardson MD      UNIVERSAL PROTOCOL   Site Marked: NA  Prior Images Obtained and Reviewed:  Yes  Required items: Required blood products, implants, devices and special equipment available    Patient identity confirmed:  Verbally with patient, arm band, provided demographic data and hospital-assigned identification number  Patient was reevaluated immediately before administering moderate or deep sedation or anesthesia  Confirmation Checklist:  Patient's identity using two indicators, relevant allergies, procedure was appropriate and matched the consent or emergent situation and correct equipment/implants were available  Time out: Immediately prior to the procedure a time out was called    Universal Protocol: the Joint Commission Universal Protocol was followed    Preparation: Patient was prepped and draped in usual sterile fashion       ANESTHESIA    Anesthesia: Local infiltration  Local Anesthetic:  Lidocaine 1% without epinephrine      SEDATION    Patient Sedated: No    Vital signs: Vital signs monitored during sedation    See dictated procedure note for full details.  Findings: LUE PICC placement, cut to 51 cm.     Specimens: none    Complications: None    Condition: Stable    Plan: LUE PICC placement, cut to 51 cm.       PROCEDURE  Describe Procedure: LUE PICC placement, cut to 51 cm.   Patient Tolerance:  Patient tolerated the procedure well with no immediate complications  Length of time physician/provider present for 1:1 monitoring during sedation: 30

## 2023-06-07 NOTE — IR NOTE
Patient Name: Parker Acevedo  Medical Record Number: [id number]  Today's Date: 6/7/2023    Procedure: Peripheral inserted central catheter placement and central venous tunneled catheter removal  Proceduralist: Dr. Fong, Dr. Baldwin  Pathology present: na    Procedure Start: 1525  Procedure end: 1534  Sedation medications administered:    Fentanyl 200 mcg   Versed 2 mg   Sedation time; 48 minutes (1511 - 1534)     Report given to:  RN  : flo    Other Notes: Pt arrived to IR room 1 from . Consent reviewed. Pt denies any questions or concerns regarding procedure. Pt positioned supine and monitored per protocol.     Pt tolerated procedure without any noted complications.     Line placed under image guidance and ok for immediate use.   Both lumens flushed with 2.5 mls of 10 units/ml of Heparin.     Pt transferred back to .

## 2023-06-08 ENCOUNTER — APPOINTMENT (OUTPATIENT)
Dept: INTERVENTIONAL RADIOLOGY/VASCULAR | Facility: CLINIC | Age: 53
End: 2023-06-08
Attending: NURSE PRACTITIONER
Payer: COMMERCIAL

## 2023-06-08 ENCOUNTER — HOSPITAL ENCOUNTER (OUTPATIENT)
Facility: CLINIC | Age: 53
Discharge: HOME | End: 2023-06-08
Attending: RADIOLOGY | Admitting: PHYSICIAN ASSISTANT
Payer: COMMERCIAL

## 2023-06-08 ENCOUNTER — TELEPHONE (OUTPATIENT)
Dept: GASTROENTEROLOGY | Facility: CLINIC | Age: 53
End: 2023-06-08
Payer: COMMERCIAL

## 2023-06-08 VITALS
OXYGEN SATURATION: 98 % | HEART RATE: 58 BPM | RESPIRATION RATE: 18 BRPM | DIASTOLIC BLOOD PRESSURE: 87 MMHG | SYSTOLIC BLOOD PRESSURE: 135 MMHG

## 2023-06-08 DIAGNOSIS — Z78.9 PROBLEM WITH VASCULAR ACCESS: Primary | ICD-10-CM

## 2023-06-08 DIAGNOSIS — Z78.9 PROBLEM WITH VASCULAR ACCESS: ICD-10-CM

## 2023-06-08 LAB
BACTERIA BLD CULT: ABNORMAL

## 2023-06-08 PROCEDURE — C1769 GUIDE WIRE: HCPCS

## 2023-06-08 PROCEDURE — 250N000009 HC RX 250: Performed by: PHYSICIAN ASSISTANT

## 2023-06-08 PROCEDURE — C1751 CATH, INF, PER/CENT/MIDLINE: HCPCS

## 2023-06-08 PROCEDURE — 250N000011 HC RX IP 250 OP 636: Performed by: PHYSICIAN ASSISTANT

## 2023-06-08 PROCEDURE — 36584 COMPL RPLCMT PICC RS&I: CPT | Performed by: PHYSICIAN ASSISTANT

## 2023-06-08 PROCEDURE — 36584 COMPL RPLCMT PICC RS&I: CPT

## 2023-06-08 RX ORDER — LIDOCAINE HYDROCHLORIDE 10 MG/ML
1-30 INJECTION, SOLUTION EPIDURAL; INFILTRATION; INTRACAUDAL; PERINEURAL
Status: COMPLETED | OUTPATIENT
Start: 2023-06-08 | End: 2023-06-08

## 2023-06-08 RX ORDER — HEPARIN SODIUM,PORCINE 10 UNIT/ML
5 VIAL (ML) INTRAVENOUS
Status: COMPLETED | OUTPATIENT
Start: 2023-06-08 | End: 2023-06-08

## 2023-06-08 RX ADMIN — Medication 2.5 ML: at 14:59

## 2023-06-08 RX ADMIN — Medication 2.5 ML: at 14:57

## 2023-06-08 RX ADMIN — LIDOCAINE HYDROCHLORIDE 2 ML: 10 INJECTION, SOLUTION EPIDURAL; INFILTRATION; INTRACAUDAL; PERINEURAL at 14:46

## 2023-06-08 ASSESSMENT — ACTIVITIES OF DAILY LIVING (ADL): ADLS_ACUITY_SCORE: 37

## 2023-06-08 NOTE — TELEPHONE ENCOUNTER
Henny Fairchild RN Arola, Tracy, RN; Jimbo Estrada MD Tracy, please cancel   As part of the cancellation do you call him   Thanks       Send to endoscopy scheduling pool to cancel and contact patient.    Beth Angeles RN

## 2023-06-08 NOTE — IR NOTE
Patient Name: Parker Acevedo  Medical Record Number: [id number]  Today's Date: 6/8/2023    Procedure: Left peripherally inserted central catheter exchange   Proceduralist: Per Dumont PA-C  Pathology present: N/A    Procedure Start: 1444  Procedure end: 1459  Sedation medications administered: local only    Report given to: N/A; pt discharged   : N/A    Other Notes: Pt arrived to IR room 2 from [facility] Waiting Room. Consent signed & reviewed. Pt denies any questions or concerns regarding procedure. Pt positioned supine and monitored per protocol.    Catheter tip placement verified with imaging and ready for immediate use. Red & purple lumens each flushed with 2.5 mL of heparin (10 units/mL).     Pt tolerated procedure without any noted complications. Pt discharged with wife via [facility] Waiting Room.

## 2023-06-08 NOTE — PROCEDURES
Interventional Radiology Brief Post Procedure Note    Procedure: IR PICC EXCHANGE LEFT    Proceduralist: Per Dumont PA-C    Assistant: None    Time Out: Prior to the start of the procedure and with procedural staff participation, I verbally confirmed the patient s identity using two indicators, relevant allergies, that the procedure was appropriate and matched the consent or emergent situation, and that the correct equipment/implants were available. Immediately prior to starting the procedure I conducted the Time Out with the procedural staff and re-confirmed the patient s name, procedure, and site/side. (The Joint Commission universal protocol was followed.)  Yes    Medications   Medication Event Details Admin User Admin Time       Sedation: None. Local Anesthestic used    Findings: Completed image guided PICC exchange.  Patient's PICC line placed on 6/7/2023 was bleeding and he accidentally retracted the catheter when doing a dressing change.  PICC line trend and exchange.  New PICC line in place 5 Israeli 48.5 cm double-lumen power PICC ready for immediate use.  Heparin locked.  Sterile dressing applied.    Estimated Blood Loss: Minimal    Fluoroscopy Time: 0.5 minute(s)    SPECIMENS: None    Complications: 1. None     Condition: Stable    Plan: Follow-up per primary team.     Comments: See dictated procedure note for full details.    Per Dumont PA-C

## 2023-06-08 NOTE — TELEPHONE ENCOUNTER
Late entry:    Call placed to [place] on 6.7.23 regarding patient status. No answer.    Left message to call [phone] option #4.    Beth Angeles RN

## 2023-06-08 NOTE — TELEPHONE ENCOUNTER
Caller: RN tried to reach patient/wife multiple times.   Reason for Reschedule/Cancellation (please be detailed, any staff messages or encounters to note?):   Beth Angeles RN  P Endoscopy Scheduling Pool  Hello,     Please cancel this patient's colonoscopy for 6.12.23. Wife Rose is on consent to communicate however has not been returning our calls.     Thank you,     Beth    ----- Message -----   From: Henny Fairchild RN   Sent: 6/8/2023  12:26 PM CDT   To: Beth Angeles RN; Jimbo Estrada MD   Subject: RE: sepsis                                       Beth, please cancel   As part of the cancellation do you call him   Thanks   ----- Message -----   From: Beth Angeles RN   Sent: 6/8/2023  10:49 AM CDT   To: Beth Angeles RN; Jimbo Estrada MD; *   Subject: sepsis                                           Esther Inman and Dr Estrada,     This patient is scheduled for a colonoscopy with you 6.12.23 and has been at [facility] last few days then transferred today to [facility] for PICC placement for sepsis.     I have not been able to reach his wife to discuss reschedule. Can you give green light to cancel as I am not able to do so without your knowledge,     Thank you,     Beth      Prior to reschedule please review:    Ordering Provider:Sean    Sedation per order:MAC    Does patient have any ASC Exclusions, please identify?: NO      Notes on Cancelled Procedure:    Procedure:Lower Endoscopy [Colonoscopy]     Date: 6/12/2023    [facility] [address] [place] [address], 5th Floor, [place] [address]    Surgeon: Saen        Rescheduled: No , sent MyChart.

## 2023-06-08 NOTE — TELEPHONE ENCOUNTER
Attempted to contact patient regarding upcoming Colonoscopy  procedure on 6/12/23 for pre assessment questions. No answer.     Left message to return call to [phone] #4    Pt was just recently discharged from hospital he needs to do extended prep - might be a good idea to reschedule to let his body heal a bit from his infection. Was going to discuss with pt.    Jyothi Amato RN  Endoscopy Procedure Pre Assessment RN

## 2023-06-09 ENCOUNTER — PATIENT OUTREACH (OUTPATIENT)
Dept: CARE COORDINATION | Facility: CLINIC | Age: 53
End: 2023-06-09
Payer: COMMERCIAL

## 2023-06-09 ENCOUNTER — LAB REQUISITION (OUTPATIENT)
Dept: LAB | Facility: CLINIC | Age: 53
End: 2023-06-09
Payer: COMMERCIAL

## 2023-06-09 DIAGNOSIS — R13.10 DYSPHAGIA, UNSPECIFIED: ICD-10-CM

## 2023-06-09 NOTE — PROGRESS NOTES
Clinic Care Coordination Contact  [facility]/Voicemail       Clinical Data: Care Coordinator Outreach  Outreach attempted x 1.  Left message on patient's voicemail with call back information and requested return call.  Plan: Care Coordinator sent care coordination introduction letter on 5/20/2022 via [employer]. Care Coordinator will try to reach patient again in 1-2 business days.    Kinsey Muhammad RN Care Coordination   [facility]  Email: [email or web address]  Phone: [phone]

## 2023-06-10 ENCOUNTER — LAB REQUISITION (OUTPATIENT)
Dept: LAB | Facility: CLINIC | Age: 53
End: 2023-06-10
Payer: COMMERCIAL

## 2023-06-10 DIAGNOSIS — K27.9 PEPTIC ULCER, SITE UNSPECIFIED, UNSPECIFIED AS ACUTE OR CHRONIC, WITHOUT HEMORRHAGE OR PERFORATION: ICD-10-CM

## 2023-06-10 PROCEDURE — 87040 BLOOD CULTURE FOR BACTERIA: CPT | Performed by: INTERNAL MEDICINE

## 2023-06-12 ENCOUNTER — TELEPHONE (OUTPATIENT)
Dept: INFECTIOUS DISEASES | Facility: CLINIC | Age: 53
End: 2023-06-12
Payer: COMMERCIAL

## 2023-06-12 ENCOUNTER — TELEPHONE (OUTPATIENT)
Dept: INTERNAL MEDICINE | Facility: CLINIC | Age: 53
End: 2023-06-12
Payer: COMMERCIAL

## 2023-06-12 NOTE — TELEPHONE ENCOUNTER
[facility] Call Center    Phone Message    May a detailed message be left on voicemail: yes     Reason for Call: Other: pelase call [facility] at [phone] x and ask for a Pharmacist from the green team in regards to orders for antibiotics ending for tomorrow     Action Taken: Other: ID    Travel Screening: Not Applicable

## 2023-06-12 NOTE — TELEPHONE ENCOUNTER
Messi, from [facility] infusion is calling and stated that PCP had ordered 2 antibiotics for infusion until 6/13/2023.      She stated they will always call the day before ending infusion to confirm IV medication will be ending the next day.    Will forward to PCP for review.    Ella Hammond RN

## 2023-06-12 NOTE — TELEPHONE ENCOUNTER
Per Dr. Isaac, 6/7/2023:  Chuy Isaac MD         6/7/23  5:01 PM  Note  Yes I am ok to oversee the antibiotics.   Restart the TPN and IV fluids.  Do a blood culture on day 4 of the 7 to see if negative  Before stopping, that is ordered.          Phoned Messi with [facility] and left a message with Genesis to inform Messi of documentation as stated below per Dr. Poncho MD.  Informed Genesis of documentation stated above.  Genesis stated understanding.      Ella Hammond, RN

## 2023-06-12 NOTE — TELEPHONE ENCOUNTER
IV medications were not ordered by myself.  This is from interventional radiology who was supposed to replace his line.  I do not know which antibiotics he is on and information should go to the other prescriber.

## 2023-06-13 ENCOUNTER — LAB REQUISITION (OUTPATIENT)
Dept: LAB | Facility: CLINIC | Age: 53
End: 2023-06-13
Payer: COMMERCIAL

## 2023-06-13 DIAGNOSIS — R13.10 DYSPHAGIA, UNSPECIFIED: ICD-10-CM

## 2023-06-13 LAB
CREAT SERPL-MCNC: 0.73 MG/DL (ref 0.67–1.17)
GFR SERPL CREATININE-BSD FRML MDRD: >90 ML/MIN/1.73M2
VANCOMYCIN SERPL-MCNC: 11.9 UG/ML

## 2023-06-13 PROCEDURE — 82565 ASSAY OF CREATININE: CPT | Performed by: SURGERY

## 2023-06-13 PROCEDURE — 80202 ASSAY OF VANCOMYCIN: CPT | Performed by: SURGERY

## 2023-06-13 NOTE — TELEPHONE ENCOUNTER
Notified [facility] nurse that Pt has not been seen in ID clinic and needs follow up in clinic to determine therapy.     [facility] had contacted PCP 6/7-see telephone enc. PCP and would assist with orders and therapy, but now declined. [facility] will get in touch with PCP again to determine course.     Attempted to call Pt for follow up appt, had to LM.

## 2023-06-13 NOTE — TELEPHONE ENCOUNTER
Good cultures are now negative but I would treat the full 7 days with his culture positive from the 4th of June.     Lets just treat the full 7 days if possible

## 2023-06-14 ENCOUNTER — TELEPHONE (OUTPATIENT)
Dept: GASTROENTEROLOGY | Facility: CLINIC | Age: 53
End: 2023-06-14
Payer: COMMERCIAL

## 2023-06-14 NOTE — TELEPHONE ENCOUNTER
Caller: Rose  Reason for Reschedule/Cancellation (please be detailed, any staff messages or encounters to note?): Previously canceled      Prior to reschedule please review:    Ordering Provider:Sean    Sedation per order:MAC    Does patient have any ASC Exclusions, please identify?: N      Notes on Cancelled Procedure:    Procedure:Lower Endoscopy [Colonoscopy]     Date: 6/12/23    Location:[facility]; [address] [place] [address], [address] [place] [address]    Surgeon: Sean        Rescheduled: Yes    Procedure: Lower Endoscopy [Colonoscopy]     Date: 9/19/23    Location:[facility]; [address] [place] [address], [place] [address]    Surgeon: Sean    Sedation Level Scheduled  MAC,  Reason for Sedation Level Per order    Prep/Instructions updated and sent: Yes-needs double prep per order from 4/13/22-instructions for extended prep sent

## 2023-06-14 NOTE — PROGRESS NOTES
Clinic Care Coordination Contact  [facility]/Voicemail       Clinical Data: Care Coordinator Outreach  Outreach attempted x 2.  Left message on patient's voicemail with call back information and requested return call.  Plan: Care Coordinator sent care coordination introduction letter on 5/20/2022 via [employer]. Care Coordinator will try to reach patient again in 10 business days.    Kinsey Muhammad RN Care Coordination   [facility] Yeyo Dove  Email: [email or web address]  Phone: [phone]

## 2023-06-15 LAB
BACTERIA BLD CULT: NO GROWTH
BACTERIA BLD CULT: NO GROWTH

## 2023-06-21 DIAGNOSIS — G89.4 CHRONIC PAIN SYNDROME: ICD-10-CM

## 2023-06-21 DIAGNOSIS — I82.90 VTE (VENOUS THROMBOEMBOLISM): ICD-10-CM

## 2023-06-21 DIAGNOSIS — R93.1 ABNORMAL ECHOCARDIOGRAM: ICD-10-CM

## 2023-06-21 DIAGNOSIS — M54.50 CHRONIC BILATERAL LOW BACK PAIN WITHOUT SCIATICA: ICD-10-CM

## 2023-06-21 DIAGNOSIS — F90.0 ADHD (ATTENTION DEFICIT HYPERACTIVITY DISORDER), INATTENTIVE TYPE: ICD-10-CM

## 2023-06-21 DIAGNOSIS — R19.8 VISCERAL HYPERALGESIA: ICD-10-CM

## 2023-06-21 DIAGNOSIS — G89.29 CHRONIC BILATERAL LOW BACK PAIN WITHOUT SCIATICA: ICD-10-CM

## 2023-06-21 DIAGNOSIS — F11.90 CHRONIC, CONTINUOUS USE OF OPIOIDS: ICD-10-CM

## 2023-06-21 DIAGNOSIS — M54.2 CERVICALGIA: ICD-10-CM

## 2023-06-21 DIAGNOSIS — R11.0 NAUSEA: ICD-10-CM

## 2023-06-21 DIAGNOSIS — R10.9 CHRONIC ABDOMINAL PAIN: ICD-10-CM

## 2023-06-21 DIAGNOSIS — G89.29 CHRONIC ABDOMINAL PAIN: ICD-10-CM

## 2023-06-21 NOTE — TELEPHONE ENCOUNTER
Patient requesting refill(s) of fentaNYL (DURAGESIC) 25 mcg/hr 72 hr patch   Last dispensed from pharmacy on 5/31/23    oxyCODONE (ROXICODONE) 5 MG/5ML solution  Last dispensed from pharmacy on 5/31/23    Patient's last office/virtual visit by prescribing provider on 5/23/23  Next office/virtual appointment scheduled for 8/23/23    Last urine drug screen date 11/09/22  Current opioid agreement on file (completed within the last year) Yes Date of opioid agreement: 11/11/22    E-prescribe to [facility] [place], [place]     Will route to nursing Humphrey for review and preparation of prescription(s).

## 2023-06-21 NOTE — TELEPHONE ENCOUNTER
Pending Prescriptions:                       Disp   Refills    enoxaparin ANTICOAGULANT (LOVENOX) 80 MG/0*67.2 mL0        Sig: INJECT THE CONTENTS OF ONE SYRINGE (80MG) UNDER THE           SKIN TWO TIMES A DAY    ADDERALL 20 MG tablet [Pharmacy Med Name: *30 tab*0        Sig: TAKE ONE TABLET BY MOUTH ONCE DAILY    carvedilol (COREG) 6.25 MG tablet [Pharmac*60 tab*3        Sig: TAKE 2 TABLETS (12.5 MG) BY MOUTH 2 TIMES DAILY WITH           MEALS    LORazepam (ATIVAN) 1 MG tablet [Pharmacy M*30 tab*0        Sig: TAKE ONE TABLET BY MOUTH ONCE DAILY AS NEEDED FOR           ANXIETY WITH TPN AND MEDICATIONS.    ondansetron (ZOFRAN ODT) 8 MG ODT tab [Pha*90 tab*1        Sig: DISSOLVE ONE TABLET ON TONGUE EVERY 8 HOURS AS NEEDED           FOR NAUSEA      Routing refill request to provider for review/approval because:  Drug not on the [facility] refill protocol     Genesis Ardon RN on 6/21/2023 at 1:28 PM

## 2023-06-22 RX ORDER — LORAZEPAM 1 MG/1
1 TABLET ORAL EVERY EVENING
Qty: 30 TABLET | Refills: 0 | Status: SHIPPED | OUTPATIENT
Start: 2023-06-22 | End: 2023-07-21

## 2023-06-22 RX ORDER — CARVEDILOL 6.25 MG/1
TABLET ORAL
Qty: 60 TABLET | Refills: 3 | Status: SHIPPED | OUTPATIENT
Start: 2023-06-22 | End: 2024-01-05

## 2023-06-22 RX ORDER — ONDANSETRON 8 MG/1
TABLET, ORALLY DISINTEGRATING ORAL
Qty: 90 TABLET | Refills: 1 | Status: SHIPPED | OUTPATIENT
Start: 2023-06-22 | End: 2023-10-20

## 2023-06-22 RX ORDER — ENOXAPARIN SODIUM 100 MG/ML
INJECTION SUBCUTANEOUS
Qty: 67.2 ML | Refills: 0 | Status: SHIPPED | OUTPATIENT
Start: 2023-06-22 | End: 2023-09-21

## 2023-06-22 RX ORDER — DEXTROAMPHETAMINE SACCHARATE, AMPHETAMINE ASPARTATE, DEXTROAMPHETAMINE SULFATE AND AMPHETAMINE SULFATE 5; 5; 5; 5 MG/1; MG/1; MG/1; MG/1
TABLET ORAL
Qty: 30 TABLET | Refills: 0 | Status: SHIPPED | OUTPATIENT
Start: 2023-06-22 | End: 2023-07-21

## 2023-06-22 NOTE — TELEPHONE ENCOUNTER
Medication refill information reviewed.     Due date for fentaNYL (DURAGESIC) 25 mcg/hr 72 hr patch and oxyCODONE (ROXICODONE) 5 MG/5ML solution is 07/02/23     Prescriptions prepped for review.     Will route to provider.

## 2023-06-26 RX ORDER — OXYCODONE HCL 5 MG/5 ML
5-10 SOLUTION, ORAL ORAL EVERY 4 HOURS PRN
Qty: 1200 ML | Refills: 0 | Status: SHIPPED | OUTPATIENT
Start: 2023-06-26 | End: 2023-07-26

## 2023-06-26 RX ORDER — FENTANYL 25 UG/1
1 PATCH TRANSDERMAL
Qty: 15 PATCH | Refills: 0 | Status: SHIPPED | OUTPATIENT
Start: 2023-06-26 | End: 2023-07-26

## 2023-06-26 NOTE — TELEPHONE ENCOUNTER
Signed Prescriptions:                        Disp   Refills    fentaNYL (DURAGESIC) 25 mcg/hr 72 hr patch 15 pat*0        Sig: Place 1 patch onto the skin every 48 hours Fill           06/30/23 and start 07/2/23. 30 day supply for           chronic pain.  Authorizing Provider: NATALIE MCDOWELL    oxyCODONE (ROXICODONE) 5 MG/5ML solution   1200 mL0        Sig: Take 5-10 mLs (5-10 mg) by mouth every 4 hours as           needed for moderate to severe pain Max of           40mg/day. Fill 06/30/23 and start 07/2/23. 30 day           supply for chronic pain.  Authorizing Provider: NATALIE MCDOWELL        Reviewed [place]  June 26, 2023- no concerning fills.    Natalie Mcdowell APRN, RN CNP, FNP  [facility]  [facility]

## 2023-06-29 ENCOUNTER — PATIENT OUTREACH (OUTPATIENT)
Dept: CARE COORDINATION | Facility: CLINIC | Age: 53
End: 2023-06-29

## 2023-06-29 NOTE — PROGRESS NOTES
Clinic Care Coordination Contact  [facility]/Voicemail       Clinical Data: Care Coordinator Outreach  Outreach attempted x 3.  Left message on patient's voicemail with call back information and requested return call.  Plan: Care Coordinator will send unable to contact letter with care coordinator contact information via [employer]. Care Coordinator will chart review in 1 month. If no return call, RN CC will send disenrollment letter.    Kinsey Muhammad RN Care Coordination   [facility]  Email: [email or web address]  Phone: [phone]

## 2023-06-30 ENCOUNTER — TELEPHONE (OUTPATIENT)
Dept: INTERNAL MEDICINE | Facility: CLINIC | Age: 53
End: 2023-06-30
Payer: COMMERCIAL

## 2023-06-30 DIAGNOSIS — Z98.84 GASTRIC BYPASS STATUS FOR OBESITY: ICD-10-CM

## 2023-06-30 RX ORDER — SUCRALFATE ORAL 1 G/10ML
SUSPENSION ORAL
Qty: 420 ML | Refills: 1 | Status: SHIPPED | OUTPATIENT
Start: 2023-06-30 | End: 2023-10-20

## 2023-06-30 NOTE — TELEPHONE ENCOUNTER
I approve of requested home care orders.  Agree nothing we can do if they can't see him, needs the line for access    Chuy Isaac MD

## 2023-06-30 NOTE — TELEPHONE ENCOUNTER
Home Care is calling regarding an established patient with [facility].       Requesting orders from: Chuy Isaac  Provider is following patient: Yes  Is this a 60-day recertification request?  No    Orders Requested    Skilled Nursing  Request for delay in care, service is not able to be provided within same scheduled day.   Patient did not allow for dressing to be changed today. Nurse is unsure when patient will allow them to come in again.    They state they are not getting any response from patient or patients wife. Per RN patient is noncompliant.    Confirmed ok to leave a detailed message with call back.  Contact information confirmed and updated as needed.    Genesis Ardon RN

## 2023-07-02 ENCOUNTER — NURSE TRIAGE (OUTPATIENT)
Dept: NURSING | Facility: CLINIC | Age: 53
End: 2023-07-02
Payer: COMMERCIAL

## 2023-07-02 NOTE — TELEPHONE ENCOUNTER
Pt's wife calling. Consent on file. Pt has PICC line and his arm is swollen, red, and tender and warm to the touch. Pt denies SOB and chest pain.     Advised ED. They will go. They have decided to go to [facility] which is closer. Advised if pt develops any concerning SXs on the way to call 911. She verbalized understanding.    Wendy Carreno, RN, BSN  [facility] Nurse Advisor 3:20 PM 7/2/2023      Reason for Disposition    Pain, redness, or swelling intravenous (IV) site or along course of vein    [1] Arm or leg swelling AND [2] has a central or PICC line  (Exception: Mild puffiness or swelling just around IV site.)    Additional Information    Negative: SEVERE difficulty breathing (e.g., struggling for each breath, speaks in single words)    Negative: Sounds like a life-threatening emergency to the triager    Negative: Followed an arm injury    Negative: Chest pain    Negative: Small area of LOCALIZED swelling followed an insect bite to the area    Negative: Swelling of wrist is main symptom    Negative: Swelling of elbow is main symptom    Negative: SEVERE difficulty breathing (e.g., struggling for each breath, speaks in single words)    Negative: Shock suspected (e.g., cold/pale/clammy skin, too weak to stand, low BP, rapid pulse)    Negative: Cracked or broken central line or PICC Line    Negative: Sounds like a life-threatening emergency to the triager    Negative: IV not running or running slowly    Negative: [1] Difficulty breathing AND [2] not severe    Negative: Moderate bleeding around IV site  (Exception: Mild bleeding that stops quickly with direct pressure)    Negative: [1] Chest pain AND [2] has central or PICC line    Negative: [1] Chest or neck swelling AND [2] has a central or PICC line  (Exception: Mild puffiness or swelling just around IV site.)    Protocols used: ARM SWELLING AND EDEMA-A-AH, IV SITE AND OTHER SYMPTOMS-A-AH

## 2023-07-03 ENCOUNTER — APPOINTMENT (OUTPATIENT)
Dept: ULTRASOUND IMAGING | Facility: CLINIC | Age: 53
End: 2023-07-03
Attending: EMERGENCY MEDICINE
Payer: COMMERCIAL

## 2023-07-03 ENCOUNTER — HOSPITAL ENCOUNTER (EMERGENCY)
Facility: CLINIC | Age: 53
Discharge: HOME | End: 2023-07-03
Attending: EMERGENCY MEDICINE | Admitting: EMERGENCY MEDICINE
Payer: COMMERCIAL

## 2023-07-03 ENCOUNTER — TELEPHONE (OUTPATIENT)
Dept: ENDOCRINOLOGY | Facility: CLINIC | Age: 53
End: 2023-07-03

## 2023-07-03 ENCOUNTER — APPOINTMENT (OUTPATIENT)
Dept: GENERAL RADIOLOGY | Facility: CLINIC | Age: 53
End: 2023-07-03
Attending: EMERGENCY MEDICINE
Payer: COMMERCIAL

## 2023-07-03 VITALS
DIASTOLIC BLOOD PRESSURE: 86 MMHG | RESPIRATION RATE: 20 BRPM | SYSTOLIC BLOOD PRESSURE: 121 MMHG | OXYGEN SATURATION: 96 % | HEART RATE: 105 BPM | TEMPERATURE: 98.5 F

## 2023-07-03 DIAGNOSIS — M79.89 ARM SWELLING: ICD-10-CM

## 2023-07-03 LAB
ANION GAP SERPL CALCULATED.3IONS-SCNC: 14 MMOL/L (ref 7–15)
BASOPHILS # BLD AUTO: 0.1 10E3/UL (ref 0–0.2)
BASOPHILS NFR BLD AUTO: 0 %
BUN SERPL-MCNC: 23.3 MG/DL (ref 6–20)
CALCIUM SERPL-MCNC: 9 MG/DL (ref 8.6–10)
CHLORIDE SERPL-SCNC: 99 MMOL/L (ref 98–107)
CREAT SERPL-MCNC: 0.86 MG/DL (ref 0.67–1.17)
DEPRECATED HCO3 PLAS-SCNC: 24 MMOL/L (ref 22–29)
EOSINOPHIL # BLD AUTO: 0.2 10E3/UL (ref 0–0.7)
EOSINOPHIL NFR BLD AUTO: 1 %
ERYTHROCYTE [DISTWIDTH] IN BLOOD BY AUTOMATED COUNT: 17 % (ref 10–15)
GFR SERPL CREATININE-BSD FRML MDRD: >90 ML/MIN/1.73M2
GLUCOSE SERPL-MCNC: 96 MG/DL (ref 70–99)
HCT VFR BLD AUTO: 37.3 % (ref 40–53)
HGB BLD-MCNC: 11.7 G/DL (ref 13.3–17.7)
IMM GRANULOCYTES # BLD: 0 10E3/UL
IMM GRANULOCYTES NFR BLD: 0 %
LACTATE SERPL-SCNC: 0.7 MMOL/L (ref 0.7–2)
LYMPHOCYTES # BLD AUTO: 2.1 10E3/UL (ref 0.8–5.3)
LYMPHOCYTES NFR BLD AUTO: 17 %
MCH RBC QN AUTO: 29.8 PG (ref 26.5–33)
MCHC RBC AUTO-ENTMCNC: 31.4 G/DL (ref 31.5–36.5)
MCV RBC AUTO: 95 FL (ref 78–100)
MONOCYTES # BLD AUTO: 1.7 10E3/UL (ref 0–1.3)
MONOCYTES NFR BLD AUTO: 14 %
NEUTROPHILS # BLD AUTO: 8.3 10E3/UL (ref 1.6–8.3)
NEUTROPHILS NFR BLD AUTO: 68 %
NRBC # BLD AUTO: 0 10E3/UL
NRBC BLD AUTO-RTO: 0 /100
PLATELET # BLD AUTO: 189 10E3/UL (ref 150–450)
POTASSIUM SERPL-SCNC: 3.7 MMOL/L (ref 3.4–5.3)
RBC # BLD AUTO: 3.92 10E6/UL (ref 4.4–5.9)
SODIUM SERPL-SCNC: 137 MMOL/L (ref 136–145)
WBC # BLD AUTO: 12.3 10E3/UL (ref 4–11)

## 2023-07-03 PROCEDURE — 99285 EMERGENCY DEPT VISIT HI MDM: CPT | Mod: 25 | Performed by: EMERGENCY MEDICINE

## 2023-07-03 PROCEDURE — 250N000011 HC RX IP 250 OP 636: Mod: JZ | Performed by: EMERGENCY MEDICINE

## 2023-07-03 PROCEDURE — 36415 COLL VENOUS BLD VENIPUNCTURE: CPT | Performed by: EMERGENCY MEDICINE

## 2023-07-03 PROCEDURE — 73090 X-RAY EXAM OF FOREARM: CPT | Mod: LT

## 2023-07-03 PROCEDURE — 80048 BASIC METABOLIC PNL TOTAL CA: CPT | Performed by: EMERGENCY MEDICINE

## 2023-07-03 PROCEDURE — 96366 THER/PROPH/DIAG IV INF ADDON: CPT | Performed by: EMERGENCY MEDICINE

## 2023-07-03 PROCEDURE — 258N000003 HC RX IP 258 OP 636: Performed by: EMERGENCY MEDICINE

## 2023-07-03 PROCEDURE — 87077 CULTURE AEROBIC IDENTIFY: CPT | Performed by: EMERGENCY MEDICINE

## 2023-07-03 PROCEDURE — 87149 DNA/RNA DIRECT PROBE: CPT | Performed by: EMERGENCY MEDICINE

## 2023-07-03 PROCEDURE — 93971 EXTREMITY STUDY: CPT | Mod: LT

## 2023-07-03 PROCEDURE — 96365 THER/PROPH/DIAG IV INF INIT: CPT | Performed by: EMERGENCY MEDICINE

## 2023-07-03 PROCEDURE — 99284 EMERGENCY DEPT VISIT MOD MDM: CPT | Performed by: EMERGENCY MEDICINE

## 2023-07-03 PROCEDURE — 83605 ASSAY OF LACTIC ACID: CPT | Performed by: EMERGENCY MEDICINE

## 2023-07-03 PROCEDURE — 85025 COMPLETE CBC W/AUTO DIFF WBC: CPT | Performed by: EMERGENCY MEDICINE

## 2023-07-03 RX ORDER — CEFTRIAXONE 2 G/1
2 INJECTION, POWDER, FOR SOLUTION INTRAMUSCULAR; INTRAVENOUS ONCE
Status: COMPLETED | OUTPATIENT
Start: 2023-07-03 | End: 2023-07-03

## 2023-07-03 RX ORDER — HEPARIN SODIUM,PORCINE 10 UNIT/ML
5-10 VIAL (ML) INTRAVENOUS
Status: DISCONTINUED | OUTPATIENT
Start: 2023-07-03 | End: 2023-07-03 | Stop reason: HOSPADM

## 2023-07-03 RX ORDER — CLINDAMYCIN HCL 150 MG
450 CAPSULE ORAL 3 TIMES DAILY
Qty: 63 CAPSULE | Refills: 0 | Status: ON HOLD | OUTPATIENT
Start: 2023-07-03 | End: 2023-07-10

## 2023-07-03 RX ADMIN — CEFTRIAXONE SODIUM 2 G: 2 INJECTION, POWDER, FOR SOLUTION INTRAMUSCULAR; INTRAVENOUS at 02:30

## 2023-07-03 RX ADMIN — SODIUM CHLORIDE 1000 ML: 9 INJECTION, SOLUTION INTRAVENOUS at 02:30

## 2023-07-03 ASSESSMENT — ACTIVITIES OF DAILY LIVING (ADL): ADLS_ACUITY_SCORE: 37

## 2023-07-03 NOTE — ED PROVIDER NOTES
History     chief complaint  HPI  Patient is a 52-year-old gentleman with a history of DVT on Lovenox, short gut syndrome on TPN, recurrent bacteremia who is presenting with 1 day of a swollen, erythematous, and painful left forearm.  He has a PICC line in the left upper extremity.  This started this morning.  No systemic symptoms such as fevers, chills, lightheadedness.  His dressing was just changed today on his PICC line.  He gets TPN through this and fluids.  No chest pain, dyspnea, or other symptoms.    Review of Systems:  All organ systems below were reviewed and are negative unless indicated in the HPI.    Constitutional  Eyes  ENT  Respiratory  Cardiovascular  Gastrointestinal  Genitourinary  Musculoskeletal  Skin  Neuro    Allergies:  Allergies   Allergen Reactions     Bactrim [Sulfamethoxazole-Trimethoprim] Rash     Penicillins Anaphylaxis     Please see Antimicrobial Management Team allergy assessment note 10/10/2018. Patient reported tolerating amoxicillin.  Tolerating cefepime and ceftriaxone without reaction 6/23     Ertapenem Nausea and Vomiting     Doxycycline Rash     Vancomycin Rash     Rash after receiving vancomycin 3/28/16 (infusion reaction?). Tolerated with slower infusion and diphenhydramine premed.       Problem List:    Patient Active Problem List    Diagnosis Date Noted     Gram-negative bacteremia 07/07/2022     Priority: Medium     Bacteremia associated with intravascular line, initial encounter (H) 05/07/2022     Priority: Medium     Acute deep vein thrombosis (DVT) of axillary vein of right upper extremity (H) 02/28/2022     Priority: Medium     Fever, unknown origin 03/19/2021     Priority: Medium     Short bowel syndrome 01/30/2021     Priority: Medium     Bloodstream infection associated with central venous catheter, initial encounter 01/30/2021     Priority: Medium     Gastric outlet obstruction 10/07/2020     Priority: Medium     Added automatically from request for surgery  [id number]       Gram-positive bacteremia 09/29/2019     Priority: Medium     On total parenteral nutrition 09/29/2019     Priority: Medium     Added automatically from request for surgery [id number]       PICC line infiltration, sequela 07/22/2019     Priority: Medium     Infection by Candida species 01/04/2019     Priority: Medium     Bacteremia 10/10/2018     Priority: Medium     Hyperlipidemia LDL goal <130 08/07/2018     Priority: Medium     S/P bariatric surgery 07/30/2018     Priority: Medium     Generalized weakness 01/30/2018     Priority: Medium     Catheter-related bloodstream infection (CRBSI) 09/23/2017     Priority: Medium     Low serum iron 09/08/2017     Priority: Medium     Anemia, iron deficiency 09/08/2017     Priority: Medium     Abnormal echocardiogram 04/11/2017     Priority: Medium     Port or reservoir infection, initial encounter 03/16/2017     Priority: Medium     Status post cervical spinal arthrodesis 02/15/2017     Priority: Medium     Short gut syndrome      Priority: Medium     Anxiety      Priority: Medium     Chronic nausea 05/10/2016     Priority: Medium     Fungemia 04/11/2016     Priority: Medium     Positive blood culture 03/29/2016     Priority: Medium     Insomnia 08/13/2015     Priority: Medium     Chronic pain 07/07/2015     Priority: Medium     Patient is followed by Dr. Milligan for ongoing prescription of pain medication.  All refills should be approved by this provider, or covering partner.    Medication(s): Fentanyl 50 mcg patches every three days/ Liquid oxycodone 5 mg/5ml up to 50 mg daily.   Maximum quantity per month: as per Dr. Milligan through [facility]  Clinic visit frequency required: Q 3 months at Pain Management    Controlled substance agreement on file: Yes       Date(s): Through Pain Management    Pain Clinic evaluation in the past: Yes       Date(s):  Ongoing every three months       Location(s):  Per pain management    DIRE Total Score(s):  No  flowsheet data found.    Last [facility] website verification:  Through [facility]   [email or web address]    Esteban Daly MD             Health Care Home 02/24/2015     Priority: Medium              Iron deficiency 05/23/2014     Priority: Medium     Vitamin D deficiency 05/22/2014     Priority: Medium     Former smoker 02/24/2014     Priority: Medium     Bile reflux esophagitis 10/16/2013     Priority: Medium     Constipation 10/01/2013     Priority: Medium     Chronic anxiety 09/25/2013     Priority: Medium     Dysphagia 09/17/2013     Priority: Medium     Weight loss, non-intentional 09/17/2013     Priority: Medium     Malnutrition (H) 09/17/2013     Priority: Medium     Dehydration 08/28/2013     Priority: Medium     Vitamin B12 deficiency without anemia 07/31/2013     Priority: Medium     Diagnosis updated by automated process. Provider to review and confirm.       Thiamine deficiency 07/31/2013     Priority: Medium     ADHD (attention deficit hyperactivity disorder), inattentive type 07/02/2013     Priority: Medium     Patient is followed by [facility] for ongoing prescription of stimulants.  All refills should be approved by this provider, or covering partner.    Medication(s): Adderall.   Maximum quantity per month: 30  Clinic visit frequency required:      Controlled substance agreement on file: No  Neuropsych evaluation for ADD completed:  No    Last [place] website verification:  done on 5/6/19  [email or web address]         Anemia 09/20/2012     Priority: Medium     Vomiting 06/28/2012     Priority: Medium     Chronic abdominal pain 06/01/2012     Priority: Medium     Patient is followed by ALEXANDRIA WHITLEY for ongoing prescription of narcotic pain medicine.  Med: liquid oxycodone up to 60 mg per day.   Maximum use per month:   Expected duration: ongoing, hope per surgery that will resolve with upcoming surgery  Narcotic agreement on file: YES  Clinic visit recommended: Q  3 months         Peptic ulcer disease 04/08/2010     Priority: Medium     Gastric bypass status for obesity 04/08/2010     Priority: Medium     Top weight of 492.          Past Medical History:    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)      Chronic abdominal pain      CLABSI (central line-associated bloodstream infection)      Complication of anesthesia      Difficulty swallowing      Gastric ulcer, unspecified as acute or chronic, without mention of hemorrhage, perforation, or obstruction      Gastro-oesophageal reflux disease      Head injury      Hiatal hernia      Other bladder disorder      Other chronic pain      PONV (postoperative nausea and vomiting)      Severe malnutrition (H)      Short gut syndrome      Tobacco abuse        Past Surgical History:    Past Surgical History:   Procedure Laterality Date     AMPUTATION       APPENDECTOMY       BACK SURGERY  11/3/2014    curve in the spine     BIOPSY LYMPH NODE CERVICAL N/A 2/20/2015    Procedure: BIOPSY LYMPH NODE CERVICAL;  Surgeon: Baron Scanlon MD;  [facility]     CHOLECYSTECTOMY       COLONOSCOPY N/A 7/14/2021    Procedure: COLONOSCOPY;  Surgeon: Jimbo Estrada MD;  [facility]     COLONOSCOPY N/A 4/13/2022    Procedure: COLONOSCOPY;  Surgeon: Jimbo Estrada MD;  Location: [facility] OR     DISCECTOMY, FUSION CERVICAL ANTERIOR ONE LEVEL, COMBINED N/A 2/15/2017    Procedure: COMBINED DISCECTOMY, FUSION CERVICAL ANTERIOR ONE LEVEL;  Surgeon: Darren Campos MD;  [facility]     ENDOSCOPIC INSERTION TUBE GASTROSTOMY  9/9/2013    Procedure: ENDOSCOPIC INSERTION TUBE GASTROSTOMY;;  Surgeon: Francis Vyas MD;  [facility]     ENDOSCOPIC ULTRASOUND UPPER GASTROINTESTINAL TRACT (GI)  4/29/2011    Procedure:ENDOSCOPIC ULTRASOUND UPPER GASTROINTESTINAL TRACT (GI); Both Procedures done Conjointly;  Surgeon:NEREIDA HOUSER; [facility]     ENDOSCOPIC ULTRASOUND UPPER GASTROINTESTINAL TRACT (GI)  9/9/2013    Procedure: ENDOSCOPIC ULTRASOUND UPPER GASTROINTESTINAL TRACT (GI);  Endoscopic Ultrasound Guide Gastrostomy Tube Placement  C-arm;  Surgeon: Noe Lizarraga MD;  [facility]     ENDOSCOPIC ULTRASOUND UPPER GASTROINTESTINAL TRACT (GI) N/A 2/24/2021    Procedure: ENDOSCOPIC ULTRASOUND, ESOPHAGOSCOPY / UPPER GASTROINTESTINAL TRACT (GI), esophagastrogastroduodenoscopy;  Surgeon: Berny Bach MD;  [facility]     ENDOSCOPY  03/25/11    EGD, [facility]     ENDOSCOPY  08/04/09    Upper Endoscopy, [facility]     ENDOSCOPY  01/05/09    Upper Endoscopy, [facility]     ESOPHAGOSCOPY, GASTROSCOPY, DUODENOSCOPY (EGD), COMBINED  4/20/2011    Procedure:COMBINED ESOPHAGOSCOPY, GASTROSCOPY, DUODENOSCOPY (EGD); Surgeon:BLU VYAS; [facility]     ESOPHAGOSCOPY, GASTROSCOPY, DUODENOSCOPY (EGD), COMBINED  6/15/2011    Procedure:COMBINED ESOPHAGOSCOPY, GASTROSCOPY, DUODENOSCOPY (EGD); Surgeon:BLU VYAS; [facility]     ESOPHAGOSCOPY, GASTROSCOPY, DUODENOSCOPY (EGD), COMBINED  6/12/2013    Procedure: COMBINED ESOPHAGOSCOPY, GASTROSCOPY, DUODENOSCOPY (EGD);;  Surgeon: Blu Vyas MD;  [facility]     ESOPHAGOSCOPY, GASTROSCOPY, DUODENOSCOPY (EGD), COMBINED  11/22/2013    Procedure: COMBINED ESOPHAGOSCOPY, GASTROSCOPY, DUODENOSCOPY (EGD);;  Surgeon: Blu Vyas MD;  [facility]     ESOPHAGOSCOPY, GASTROSCOPY, DUODENOSCOPY (EGD), COMBINED  4/30/2014    Procedure: COMBINED ESOPHAGOSCOPY, GASTROSCOPY, DUODENOSCOPY (EGD);  Surgeon: Blu Vyas MD;  [facility]     ESOPHAGOSCOPY, GASTROSCOPY, DUODENOSCOPY (EGD), COMBINED N/A 2/20/2015    Procedure: COMBINED ESOPHAGOSCOPY, GASTROSCOPY, DUODENOSCOPY (EGD), BIOPSY SINGLE OR MULTIPLE;  Surgeon: Baron Scanlon MD;  [facility]     ESOPHAGOSCOPY, GASTROSCOPY,  DUODENOSCOPY (EGD), COMBINED N/A 9/30/2015    Procedure: COMBINED ESOPHAGOSCOPY, GASTROSCOPY, DUODENOSCOPY (EGD);  Surgeon: Francis Vyas MD;  Location:  GI     ESOPHAGOSCOPY, GASTROSCOPY, DUODENOSCOPY (EGD), COMBINED N/A 10/3/2019    Procedure: Upper Endoscopy;  Surgeon: Clif Morrow MD;  [facility]     GASTRECTOMY  6/22/2012    Procedure: GASTRECTOMY;  Open Approach, Excise Ulcers,Partial Gastrectomy, Esophagojejunostomy, Hiatal Hernia Repair, Extensive Lysis of Adhesions and Esaphagogastrodudenoscopy.;  Surgeon: Francis Vyas MD;  [facility]     GASTROJEJUNOSTOMY  08/26/09    Extensice enterolysis, partial resect. jejunum, part. resect gastric pouch, gastrojejunostomy anastomosis     HC ESOPH/GAS REFLUX TEST W NASAL IMPED ELECTRODE  8/5/2013    Procedure: ESOPHAGEAL IMPEDENCE FUNCTION TEST 1 HOUR OR LESS;  Surgeon: Halie Lang MD;  Location:  GI     HEAD & NECK SURGERY  2/15/2017    C5-C6     HERNIA REPAIR  2006    Umbilical hernia     HERNIORRHAPHY HIATAL  6/22/2012    Procedure: HERNIORRHAPHY HIATAL;;  Surgeon: Francis Vyas MD;  Location:  OR     HERNIORRHAPHY INGUINAL  11/22/2013    Procedure: HERNIORRHAPHY INGUINAL;;  Surgeon: Francis Vyas MD;  [facility]     INSERT PICC LINE Right 12/19/2019    Procedure: Picc Placement;  Surgeon: Per Dumont PA-C;  [facility]     INSERT PICC LINE Right 2/21/2020    Procedure: INSERTION, PICC;  Surgeon: Per Dumont PA-C;  [facility]     INSERT PORT VASCULAR ACCESS Right 12/19/2017    Procedure: INSERT PORT VASCULAR ACCESS;  Right Chest Port Placement ;  Surgeon: Lisandro Alejandro PA-C;  [facility]     INSERT PORT VASCULAR ACCESS Right 8/2/2018    Procedure: INSERT PORT VASCULAR ACCESS;  Place single lumen tunneled central venous access catheter;  Surgeon: Guy Jamil PA-C;  [facility]     IR CVC TUNNEL PLACEMENT > 5 YRS OF AGE  8/7/2019     IR CVC  TUNNEL PLACEMENT > 5 YRS OF AGE  4/14/2020     IR CVC TUNNEL PLACEMENT > 5 YRS OF AGE  8/3/2020     IR CVC TUNNEL PLACEMENT > 5 YRS OF AGE  9/4/2020     IR CVC TUNNEL PLACEMENT > 5 YRS OF AGE  2/5/2021     IR CVC TUNNEL PLACEMENT > 5 YRS OF AGE  3/23/2021     IR CVC TUNNEL PLACEMENT > 5 YRS OF AGE  1/13/2022     IR CVC TUNNEL PLACEMENT > 5 YRS OF AGE  5/12/2022     IR CVC TUNNEL PLACEMENT > 5 YRS OF AGE  7/13/2022     IR CVC TUNNEL REMOVAL LEFT  6/7/2023     IR CVC TUNNEL REMOVAL RIGHT  10/1/2019     IR CVC TUNNEL REMOVAL RIGHT  7/30/2020     IR CVC TUNNEL REMOVAL RIGHT  9/2/2020     IR CVC TUNNEL REMOVAL RIGHT  2/3/2021     IR CVC TUNNEL REMOVAL RIGHT  3/19/2021     IR CVC TUNNEL REMOVAL RIGHT  1/10/2022     IR CVC TUNNEL REMOVAL RIGHT  5/9/2022     IR CVC TUNNEL REMOVAL RIGHT  7/8/2022     IR CVC TUNNEL REVISION LEFT  8/10/2022     IR CVC TUNNEL REVISION RIGHT  5/7/2021     IR FOLLOW UP VISIT OUTPATIENT  8/7/2019     IR PICC EXCHANGE LEFT  6/8/2023     IR PICC PLACEMENT > 5 YRS OF AGE  3/7/2019     IR PICC PLACEMENT > 5 YRS OF AGE  12/19/2019     IR PICC PLACEMENT > 5 YRS OF AGE  2/21/2020     IR PICC PLACEMENT > 5 YRS OF AGE  6/7/2023     LAPAROTOMY EXPLORATORY  11/22/2013    Procedure: LAPAROTOMY EXPLORATORY;  Exploratory Laparotomy, Upper Endoscopy, Left Inguinal Hernia Repair;  Surgeon: Francis Vyas MD;  [facility]     ORTHOPEDIC SURGERY       PICC INSERTION Right 03/16/2017    5fr DL BioFlo PICC, 42cm (3cm external) in the R medial brachial vein w/ tip in the SVC RA junction.     PICC INSERTION Left 09/23/2017    5fr DL BioFlo PICC, 45cm (1cm external) in the L basilic vein w/ tip in the SVC RA junction.     PICC INSERTION Right 05/16/2019    5Fr - 43cm, Medial brachial vein, low SVC     PICC INSERTION Right 10/02/2019    5Fr - 43cm (2cm external), basilic vein, low SVC     SHAYLEE EN Y BOWEL  2003     SOFT TISSUE SURGERY       THORACIC SURGERY       TONSILLECTOMY       TRANSESOPHAGEAL  ECHOCARDIOGRAM INTRAOPERATIVE N/A 1/8/2019    Procedure: TRANSESOPHAGEAL ECHOCARDIOGRAM INTRAOPERATIVE;  Surgeon: GENERIC ANESTHESIA PROVIDER;  [facility]     C GASTRIC BYPASS,OBESE<100CM SHAYLEE-EN-Y  2002    lost 300 pounds       Family History:    Family History   Problem Relation Age of Onset     Gastrointestinal Disease Mother         Crohns disease     Anxiety Disorder Mother      Thyroid Disease Mother         Grave's disease     Cancer Father         ear cancer-skin cancer/melanoma     Breast Cancer Maternal Grandmother      Macular Degeneration Maternal Grandfather      Anxiety Disorder Sister      Diabetes Maternal Uncle      Breast Cancer Other      Hypertension No family hx of      Hyperlipidemia No family hx of      Cerebrovascular Disease No family hx of      Prostate Cancer No family hx of      Depression No family hx of      Anesthesia Reaction No family hx of      Asthma No family hx of      Osteoporosis No family hx of      Genetic Disorder No family hx of      Obesity No family hx of      Mental Illness No family hx of      Substance Abuse No family hx of      Glaucoma No family hx of        Medications:    clindamycin (CLEOCIN) 150 MG capsule  albuterol (VENTOLIN HFA) 108 (90 Base) MCG/ACT inhaler  amphetamine-dextroamphetamine (ADDERALL) 20 MG tablet  carvedilol (COREG) 6.25 MG tablet  cyanocobalamin (CYANOCOBALAMIN) 1000 MCG/ML injection  enoxaparin ANTICOAGULANT (LOVENOX) 80 MG/0.8ML syringe  EPINEPHrine (ANY BX GENERIC EQUIV) 0.3 MG/0.3ML injection 2-pack  [facility] INFUSION MANAGED PATIENT  fentaNYL (DURAGESIC) 25 mcg/hr 72 hr patch  lidocaine (LIDODERM) 5 % patch  LORazepam (ATIVAN) 1 MG tablet  naloxone (NARCAN) 4 MG/0.1ML nasal spray  nystatin (MYCOSTATIN) 231136 UNIT/GM external cream  ondansetron (ZOFRAN ODT) 8 MG ODT tab  oxyCODONE (ROXICODONE) 5 MG/5ML solution  pantoprazole (PROTONIX) 40 MG EC tablet  polyethylene glycol (MIRALAX) 17 GM/Dose powder  sucralfate (CARAFATE) 1  "GM/10ML suspension  Syringe/Needle, Disp, (B-D ECLIPSE SYRINGE) 27G X 1/2\" 1 ML MISC  vitamin D2 (ERGOCALCIFEROL) 00758 units (1250 mcg) capsule          Physical Exam   BP: 121/86  Pulse: 105  Temp: 98.5  F (36.9  C)  Resp: 20  SpO2: 96 %    Gen: Vital signs reviewed  Eyes: Sclera white, pupils round  ENT: External ears and nares normal  Card: Regular rate and rhythm  Resp: No respiratory distress. Lungs clear to auscultation bilaterally  Abd: Soft, non-distended, non-tender  Extremities: Symmetric distal pulses.  Volar aspect of his left forearm has a confluent patch of erythema, warmth, and tenderness across the entire length of the forearm.  His PICC line does not have any erythema or tenderness directly surrounding the insertion site. Slight swelling of his hand.  No crepitus.  Neuro: Alert, speech normal.     ED Course        Procedures           Results for orders placed or performed during the hospital encounter of 07/03/23 (from the past 24 hour(s))   CBC with platelets differential    Narrative    The following orders were created for panel order CBC with platelets differential.  Procedure                               Abnormality         Status                     ---------                               -----------         ------                     CBC with platelets and d...[[id number]]  Abnormal            Final result                 Please view results for these tests on the individual orders.   Basic metabolic panel   Result Value Ref Range    Sodium 137 136 - 145 mmol/L    Potassium 3.7 3.4 - 5.3 mmol/L    Chloride 99 98 - 107 mmol/L    Carbon Dioxide (CO2) 24 22 - 29 mmol/L    Anion Gap 14 7 - 15 mmol/L    Urea Nitrogen 23.3 (H) 6.0 - 20.0 mg/dL    Creatinine 0.86 0.67 - 1.17 mg/dL    Calcium 9.0 8.6 - 10.0 mg/dL    Glucose 96 70 - 99 mg/dL    GFR Estimate >90 >60 mL/min/1.73m2   Lactic acid whole blood   Result Value Ref Range    Lactic Acid 0.7 0.7 - 2.0 mmol/L   CBC with platelets and " differential   Result Value Ref Range    WBC Count 12.3 (H) 4.0 - 11.0 10e3/uL    RBC Count 3.92 (L) 4.40 - 5.90 10e6/uL    Hemoglobin 11.7 (L) 13.3 - 17.7 g/dL    Hematocrit 37.3 (L) 40.0 - 53.0 %    MCV 95 78 - 100 fL    MCH 29.8 26.5 - 33.0 pg    MCHC 31.4 (L) 31.5 - 36.5 g/dL    RDW 17.0 (H) 10.0 - 15.0 %    Platelet Count 189 150 - 450 10e3/uL    % Neutrophils 68 %    % Lymphocytes 17 %    % Monocytes 14 %    % Eosinophils 1 %    % Basophils 0 %    % Immature Granulocytes 0 %    NRBCs per 100 WBC 0 <1 /100    Absolute Neutrophils 8.3 1.6 - 8.3 10e3/uL    Absolute Lymphocytes 2.1 0.8 - 5.3 10e3/uL    Absolute Monocytes 1.7 (H) 0.0 - 1.3 10e3/uL    Absolute Eosinophils 0.2 0.0 - 0.7 10e3/uL    Absolute Basophils 0.1 0.0 - 0.2 10e3/uL    Absolute Immature Granulocytes 0.0 <=0.4 10e3/uL    Absolute NRBCs 0.0 10e3/uL   Radius/Ulna XR,  PA &LAT, left    Narrative    EXAM: XR FOREARM LEFT 2 VIEWS  LOCATION: [facility]  DATE: 7/3/2023    INDICATION: infection, eval for gas formation  COMPARISON: None.      Impression    IMPRESSION: Within normal limits. No fracture. No foreign body or evidence of free air.   US Upper Extremity Venous Duplex Left    Narrative    EXAM: US UPPER EXTREMITY VENOUS DUPLEX LEFT  LOCATION: [facility]  DATE: 7/3/2023    INDICATION: left upper extremity swelling, PICC line in place, missed his lovenox shots  COMPARISON: None.  TECHNIQUE: Venous Duplex ultrasound of the left upper extremity with (when possible) and without compression, augmentation, and duplex. Color flow and spectral Doppler with waveform analysis performed.    FINDINGS: Ultrasound includes evaluation of the internal jugular vein, innominate vein, subclavian vein, axillary vein, and brachial vein. The superficial cephalic and basilic veins were also evaluated where seen.     LEFT: Subclavian vein noncompressible absent flow. No superficial thrombophlebitis.      Right internal jugular vein portion compression patent flow.      Impression    IMPRESSION:   1.  Deep venous thrombosis in left subclavian vein.  2.  Deep venous thrombosis in right internal jugular vein.     *Note: Due to a large number of results and/or encounters for the requested time period, some results have not been displayed. A complete set of results can be found in Results Review.       Medications   heparin lock flush 10 UNIT/ML injection 5-10 mL (has no administration in time range)   0.9% sodium chloride BOLUS (0 mLs Intravenous Stopped 7/3/23 1482)   cefTRIAXone (ROCEPHIN) 2 g vial to attach to  ml bag for ADULTS or NS 50 ml bag for PEDS (0 g Intravenous Stopped 7/3/23 9881)         Consultations:  None    Social Determinants of Health:  Presents with wife    Assessments & Plan (with Medical Decision Making)       I have reviewed the nursing notes.    I have reviewed the findings, diagnosis, plan and need for follow up with the patient.      Medical Decision Making  On arrival, patient is afebrile but slightly tachycardic.  Differential his presentation includes cellulitis, DVT.  Lower suspicion for necrotizing soft tissue infection.  X-ray does not reveal gas.  My suspicion for DVT is still increased given his noncompliance with his Lovenox as he states he has missed several doses recently.  He does have an increased white blood cell count here with a normal lactic acid.  Blood cultures were sent.  His ultrasound showed DVT in the right internal jugular vein and the left subclavian vein.  Patient states he is aware of these and that is why he is on Lovenox.  They may have worsened, which could have worsened the swelling.  Ideally with patient's PICC line in place and possible cellulitis with possible bacteremia, patient would be admitted to the hospital for monitoring.  I discussed with him that I recommend this.  Patient does not want this.  He wants to sleep in his own bed tonight.  He  states he will follow-up with primary care and also follow-up down with [facility] with his IR team if his PICC line is giving him trouble.  He does recognize that if he is having bacteremia keeping the PICC line in will be detrimental.  I did give him a dose of Rocephin and started him on oral clindamycin due to trouble with cephalexin in the past.  I discussed strict return precautions with him.  Patient has a capacity make this decision.  I encouraged him to take his Lovenox twice per day like prescribed.  Given strict return precautions and discharged in stable condition.    Final diagnoses:   Arm swelling         Anastacio Schofield M.D.   [facility]     Anastacio Schofield MD  07/03/23 0434

## 2023-07-03 NOTE — ED TRIAGE NOTES
Triage Assessment     Row Name 07/03/23 0202       Triage Assessment (Adult)    Airway WDL WDL       Respiratory WDL    Respiratory WDL WDL            Left arm reddened and painful. PT concerned it is infection from left arm PICC

## 2023-07-03 NOTE — DISCHARGE INSTRUCTIONS
As discussed, would be safer to have you admitted to the hospital to see if this is worsening cellulitis, infected PICC line, or worsening blood clot in your arm.  Regardless you need to take antibiotics.  I prescribed this for you.  Please take your Lovenox twice per day.  This will be the treatment for blood clots.  Follow-up closely with your primary care doctor and return here if your symptoms worsen despite this treatment.

## 2023-07-03 NOTE — TELEPHONE ENCOUNTER
[facility] [place] at [facility]: stated patient was seen in the ED last night into early this morning. Stated pt was told he needed to be admitted to the hospital but pt declined and is back home. Wanted to let provider know.

## 2023-07-04 ENCOUNTER — APPOINTMENT (OUTPATIENT)
Dept: GENERAL RADIOLOGY | Facility: CLINIC | Age: 53
DRG: 315 | End: 2023-07-04
Attending: PHYSICIAN ASSISTANT
Payer: COMMERCIAL

## 2023-07-04 ENCOUNTER — HOSPITAL ENCOUNTER (INPATIENT)
Facility: CLINIC | Age: 53
LOS: 7 days | Discharge: HOME HEALTH | DRG: 315 | End: 2023-07-11
Attending: EMERGENCY MEDICINE | Admitting: PEDIATRICS
Payer: COMMERCIAL

## 2023-07-04 DIAGNOSIS — L03.114 CELLULITIS OF LEFT UPPER EXTREMITY: ICD-10-CM

## 2023-07-04 DIAGNOSIS — I82.A12 ACUTE DEEP VEIN THROMBOSIS (DVT) OF AXILLARY VEIN OF LEFT UPPER EXTREMITY (H): ICD-10-CM

## 2023-07-04 DIAGNOSIS — T82.7XXA BACTEREMIA ASSOCIATED WITH INTRAVASCULAR LINE, INITIAL ENCOUNTER (H): Primary | ICD-10-CM

## 2023-07-04 DIAGNOSIS — R78.81 GRAM-NEGATIVE BACTEREMIA: ICD-10-CM

## 2023-07-04 DIAGNOSIS — Z98.84 S/P BARIATRIC SURGERY: ICD-10-CM

## 2023-07-04 DIAGNOSIS — R78.81 POSITIVE BLOOD CULTURE: ICD-10-CM

## 2023-07-04 DIAGNOSIS — R78.81 BACTEREMIA ASSOCIATED WITH INTRAVASCULAR LINE, INITIAL ENCOUNTER (H): Primary | ICD-10-CM

## 2023-07-04 DIAGNOSIS — T80.211A BLOODSTREAM INFECTION ASSOCIATED WITH CENTRAL VENOUS CATHETER, INITIAL ENCOUNTER: ICD-10-CM

## 2023-07-04 LAB
ALBUMIN SERPL BCG-MCNC: 4.1 G/DL (ref 3.5–5.2)
ALP SERPL-CCNC: 81 U/L (ref 40–129)
ALT SERPL W P-5'-P-CCNC: 17 U/L (ref 0–70)
ANION GAP SERPL CALCULATED.3IONS-SCNC: 11 MMOL/L (ref 7–15)
AST SERPL W P-5'-P-CCNC: 24 U/L (ref 0–45)
BASOPHILS # BLD AUTO: 0 10E3/UL (ref 0–0.2)
BASOPHILS NFR BLD AUTO: 0 %
BILIRUB SERPL-MCNC: 0.7 MG/DL
BUN SERPL-MCNC: 12.7 MG/DL (ref 6–20)
CALCIUM SERPL-MCNC: 9.2 MG/DL (ref 8.6–10)
CHLORIDE SERPL-SCNC: 103 MMOL/L (ref 98–107)
CREAT SERPL-MCNC: 0.69 MG/DL (ref 0.67–1.17)
CRP SERPL-MCNC: 89.6 MG/L
DEPRECATED HCO3 PLAS-SCNC: 25 MMOL/L (ref 22–29)
ENTEROCOCCUS FAECALIS: NOT DETECTED
ENTEROCOCCUS FAECIUM: NOT DETECTED
EOSINOPHIL # BLD AUTO: 0.1 10E3/UL (ref 0–0.7)
EOSINOPHIL NFR BLD AUTO: 1 %
ERYTHROCYTE [DISTWIDTH] IN BLOOD BY AUTOMATED COUNT: 16.8 % (ref 10–15)
ERYTHROCYTE [SEDIMENTATION RATE] IN BLOOD BY WESTERGREN METHOD: 17 MM/HR (ref 0–20)
GFR SERPL CREATININE-BSD FRML MDRD: >90 ML/MIN/1.73M2
GLUCOSE SERPL-MCNC: 95 MG/DL (ref 70–99)
HCT VFR BLD AUTO: 36.7 % (ref 40–53)
HGB BLD-MCNC: 11 G/DL (ref 13.3–17.7)
HOLD SPECIMEN: NORMAL
IMM GRANULOCYTES # BLD: 0 10E3/UL
IMM GRANULOCYTES NFR BLD: 0 %
LACTATE SERPL-SCNC: 0.9 MMOL/L (ref 0.7–2)
LISTERIA SPECIES (DETECTED/NOT DETECTED): NOT DETECTED
LYMPHOCYTES # BLD AUTO: 1.4 10E3/UL (ref 0.8–5.3)
LYMPHOCYTES NFR BLD AUTO: 14 %
MCH RBC QN AUTO: 30 PG (ref 26.5–33)
MCHC RBC AUTO-ENTMCNC: 30 G/DL (ref 31.5–36.5)
MCV RBC AUTO: 100 FL (ref 78–100)
MONOCYTES # BLD AUTO: 1.2 10E3/UL (ref 0–1.3)
MONOCYTES NFR BLD AUTO: 11 %
NEUTROPHILS # BLD AUTO: 7.5 10E3/UL (ref 1.6–8.3)
NEUTROPHILS NFR BLD AUTO: 74 %
NRBC # BLD AUTO: 0 10E3/UL
NRBC BLD AUTO-RTO: 0 /100
PLATELET # BLD AUTO: 194 10E3/UL (ref 150–450)
POTASSIUM SERPL-SCNC: 3.9 MMOL/L (ref 3.4–5.3)
PROCALCITONIN SERPL IA-MCNC: 0.14 NG/ML
PROT SERPL-MCNC: 7.3 G/DL (ref 6.4–8.3)
RBC # BLD AUTO: 3.67 10E6/UL (ref 4.4–5.9)
SODIUM SERPL-SCNC: 139 MMOL/L (ref 136–145)
STAPHYLOCOCCUS AUREUS: NOT DETECTED
STAPHYLOCOCCUS EPIDERMIDIS: DETECTED
STAPHYLOCOCCUS LUGDUNENSIS: NOT DETECTED
STREPTOCOCCUS AGALACTIAE: NOT DETECTED
STREPTOCOCCUS ANGINOSUS GROUP: NOT DETECTED
STREPTOCOCCUS PNEUMONIAE: NOT DETECTED
STREPTOCOCCUS PYOGENES: NOT DETECTED
STREPTOCOCCUS SPECIES: NOT DETECTED
WBC # BLD AUTO: 10.3 10E3/UL (ref 4–11)

## 2023-07-04 PROCEDURE — 120N000002 HC R&B MED SURG/OB UMMC

## 2023-07-04 PROCEDURE — 250N000013 HC RX MED GY IP 250 OP 250 PS 637

## 2023-07-04 PROCEDURE — 73130 X-RAY EXAM OF HAND: CPT | Mod: LT

## 2023-07-04 PROCEDURE — 96374 THER/PROPH/DIAG INJ IV PUSH: CPT | Performed by: EMERGENCY MEDICINE

## 2023-07-04 PROCEDURE — 99285 EMERGENCY DEPT VISIT HI MDM: CPT | Mod: FS | Performed by: EMERGENCY MEDICINE

## 2023-07-04 PROCEDURE — 250N000011 HC RX IP 250 OP 636: Performed by: EMERGENCY MEDICINE

## 2023-07-04 PROCEDURE — 85025 COMPLETE CBC W/AUTO DIFF WBC: CPT | Performed by: PHYSICIAN ASSISTANT

## 2023-07-04 PROCEDURE — 84145 PROCALCITONIN (PCT): CPT | Performed by: PHYSICIAN ASSISTANT

## 2023-07-04 PROCEDURE — 3E0436Z INTRODUCTION OF NUTRITIONAL SUBSTANCE INTO CENTRAL VEIN, PERCUTANEOUS APPROACH: ICD-10-PCS | Performed by: EMERGENCY MEDICINE

## 2023-07-04 PROCEDURE — 86140 C-REACTIVE PROTEIN: CPT | Performed by: PHYSICIAN ASSISTANT

## 2023-07-04 PROCEDURE — 250N000011 HC RX IP 250 OP 636: Mod: JZ

## 2023-07-04 PROCEDURE — 87070 CULTURE OTHR SPECIMN AEROBIC: CPT | Performed by: PEDIATRICS

## 2023-07-04 PROCEDURE — 250N000011 HC RX IP 250 OP 636: Performed by: PHYSICIAN ASSISTANT

## 2023-07-04 PROCEDURE — 99223 1ST HOSP IP/OBS HIGH 75: CPT | Mod: GC | Performed by: PEDIATRICS

## 2023-07-04 PROCEDURE — 73090 X-RAY EXAM OF FOREARM: CPT | Mod: 26 | Performed by: RADIOLOGY

## 2023-07-04 PROCEDURE — 83605 ASSAY OF LACTIC ACID: CPT | Performed by: PHYSICIAN ASSISTANT

## 2023-07-04 PROCEDURE — 73090 X-RAY EXAM OF FOREARM: CPT | Mod: LT

## 2023-07-04 PROCEDURE — 80053 COMPREHEN METABOLIC PANEL: CPT | Performed by: PHYSICIAN ASSISTANT

## 2023-07-04 PROCEDURE — 87077 CULTURE AEROBIC IDENTIFY: CPT | Performed by: PHYSICIAN ASSISTANT

## 2023-07-04 PROCEDURE — 258N000003 HC RX IP 258 OP 636: Performed by: EMERGENCY MEDICINE

## 2023-07-04 PROCEDURE — 85652 RBC SED RATE AUTOMATED: CPT | Performed by: PHYSICIAN ASSISTANT

## 2023-07-04 PROCEDURE — 73130 X-RAY EXAM OF HAND: CPT | Mod: 26 | Performed by: RADIOLOGY

## 2023-07-04 PROCEDURE — 36415 COLL VENOUS BLD VENIPUNCTURE: CPT | Performed by: PHYSICIAN ASSISTANT

## 2023-07-04 PROCEDURE — 87077 CULTURE AEROBIC IDENTIFY: CPT | Performed by: PEDIATRICS

## 2023-07-04 PROCEDURE — 99285 EMERGENCY DEPT VISIT HI MDM: CPT | Mod: 25 | Performed by: EMERGENCY MEDICINE

## 2023-07-04 PROCEDURE — 250N000013 HC RX MED GY IP 250 OP 250 PS 637: Performed by: PHYSICIAN ASSISTANT

## 2023-07-04 RX ORDER — LIDOCAINE 4 G/G
1-2 PATCH TOPICAL
Status: DISCONTINUED | OUTPATIENT
Start: 2023-07-05 | End: 2023-07-08

## 2023-07-04 RX ORDER — CARVEDILOL 6.25 MG/1
6.25 TABLET ORAL 2 TIMES DAILY WITH MEALS
Status: DISCONTINUED | OUTPATIENT
Start: 2023-07-04 | End: 2023-07-11 | Stop reason: HOSPADM

## 2023-07-04 RX ORDER — LIDOCAINE 40 MG/G
CREAM TOPICAL
Status: DISCONTINUED | OUTPATIENT
Start: 2023-07-04 | End: 2023-07-07

## 2023-07-04 RX ORDER — SUCRALFATE ORAL 1 G/10ML
1 SUSPENSION ORAL
Status: DISCONTINUED | OUTPATIENT
Start: 2023-07-04 | End: 2023-07-11 | Stop reason: HOSPADM

## 2023-07-04 RX ORDER — DEXTROAMPHETAMINE SACCHARATE, AMPHETAMINE ASPARTATE, DEXTROAMPHETAMINE SULFATE AND AMPHETAMINE SULFATE 2.5; 2.5; 2.5; 2.5 MG/1; MG/1; MG/1; MG/1
20 TABLET ORAL DAILY
Status: DISCONTINUED | OUTPATIENT
Start: 2023-07-05 | End: 2023-07-11 | Stop reason: HOSPADM

## 2023-07-04 RX ORDER — NALOXONE HYDROCHLORIDE 0.4 MG/ML
0.2 INJECTION, SOLUTION INTRAMUSCULAR; INTRAVENOUS; SUBCUTANEOUS
Status: DISCONTINUED | OUTPATIENT
Start: 2023-07-04 | End: 2023-07-11 | Stop reason: HOSPADM

## 2023-07-04 RX ORDER — OXYCODONE HCL 5 MG/5 ML
5-10 SOLUTION, ORAL ORAL EVERY 4 HOURS PRN
Status: DISCONTINUED | OUTPATIENT
Start: 2023-07-04 | End: 2023-07-11 | Stop reason: HOSPADM

## 2023-07-04 RX ORDER — ENOXAPARIN SODIUM 100 MG/ML
80 INJECTION SUBCUTANEOUS 2 TIMES DAILY
Status: DISCONTINUED | OUTPATIENT
Start: 2023-07-04 | End: 2023-07-11 | Stop reason: HOSPADM

## 2023-07-04 RX ORDER — FENTANYL 25 UG/1
25 PATCH TRANSDERMAL
Status: DISCONTINUED | OUTPATIENT
Start: 2023-07-05 | End: 2023-07-08

## 2023-07-04 RX ORDER — ONDANSETRON 4 MG/1
4 TABLET, ORALLY DISINTEGRATING ORAL EVERY 6 HOURS PRN
Status: DISCONTINUED | OUTPATIENT
Start: 2023-07-04 | End: 2023-07-11 | Stop reason: HOSPADM

## 2023-07-04 RX ORDER — ACETAMINOPHEN 325 MG/1
650 TABLET ORAL EVERY 6 HOURS PRN
Status: DISCONTINUED | OUTPATIENT
Start: 2023-07-04 | End: 2023-07-06 | Stop reason: DRUGHIGH

## 2023-07-04 RX ORDER — CYANOCOBALAMIN 1000 UG/ML
1000 INJECTION, SOLUTION INTRAMUSCULAR; SUBCUTANEOUS
Status: DISCONTINUED | OUTPATIENT
Start: 2023-07-05 | End: 2023-07-11 | Stop reason: HOSPADM

## 2023-07-04 RX ORDER — ACETAMINOPHEN 650 MG/1
650 SUPPOSITORY RECTAL EVERY 6 HOURS PRN
Status: DISCONTINUED | OUTPATIENT
Start: 2023-07-04 | End: 2023-07-06 | Stop reason: DRUGHIGH

## 2023-07-04 RX ORDER — AMOXICILLIN 250 MG
2 CAPSULE ORAL 2 TIMES DAILY PRN
Status: DISCONTINUED | OUTPATIENT
Start: 2023-07-04 | End: 2023-07-11 | Stop reason: HOSPADM

## 2023-07-04 RX ORDER — HYDROMORPHONE HYDROCHLORIDE 1 MG/ML
0.5 INJECTION, SOLUTION INTRAMUSCULAR; INTRAVENOUS; SUBCUTANEOUS ONCE
Status: COMPLETED | OUTPATIENT
Start: 2023-07-04 | End: 2023-07-04

## 2023-07-04 RX ORDER — ONDANSETRON 2 MG/ML
4 INJECTION INTRAMUSCULAR; INTRAVENOUS EVERY 6 HOURS PRN
Status: DISCONTINUED | OUTPATIENT
Start: 2023-07-04 | End: 2023-07-11 | Stop reason: HOSPADM

## 2023-07-04 RX ORDER — NALOXONE HYDROCHLORIDE 0.4 MG/ML
0.4 INJECTION, SOLUTION INTRAMUSCULAR; INTRAVENOUS; SUBCUTANEOUS
Status: DISCONTINUED | OUTPATIENT
Start: 2023-07-04 | End: 2023-07-11 | Stop reason: HOSPADM

## 2023-07-04 RX ORDER — ALBUTEROL SULFATE 90 UG/1
2 AEROSOL, METERED RESPIRATORY (INHALATION) EVERY 6 HOURS PRN
Status: DISCONTINUED | OUTPATIENT
Start: 2023-07-04 | End: 2023-07-11 | Stop reason: HOSPADM

## 2023-07-04 RX ORDER — AMOXICILLIN 250 MG
1 CAPSULE ORAL 2 TIMES DAILY PRN
Status: DISCONTINUED | OUTPATIENT
Start: 2023-07-04 | End: 2023-07-11 | Stop reason: HOSPADM

## 2023-07-04 RX ORDER — PANTOPRAZOLE SODIUM 40 MG/1
40 TABLET, DELAYED RELEASE ORAL DAILY
Status: DISCONTINUED | OUTPATIENT
Start: 2023-07-05 | End: 2023-07-11 | Stop reason: HOSPADM

## 2023-07-04 RX ORDER — NYSTATIN 100000 U/G
CREAM TOPICAL 2 TIMES DAILY
Status: DISCONTINUED | OUTPATIENT
Start: 2023-07-04 | End: 2023-07-11 | Stop reason: HOSPADM

## 2023-07-04 RX ORDER — OXYCODONE HYDROCHLORIDE 10 MG/1
10 TABLET ORAL ONCE
Status: COMPLETED | OUTPATIENT
Start: 2023-07-04 | End: 2023-07-04

## 2023-07-04 RX ORDER — LORAZEPAM 1 MG/1
1 TABLET ORAL EVERY EVENING
Status: DISCONTINUED | OUTPATIENT
Start: 2023-07-04 | End: 2023-07-11 | Stop reason: HOSPADM

## 2023-07-04 RX ORDER — DIPHENHYDRAMINE HYDROCHLORIDE 50 MG/ML
50 INJECTION INTRAMUSCULAR; INTRAVENOUS ONCE
Status: COMPLETED | OUTPATIENT
Start: 2023-07-04 | End: 2023-07-04

## 2023-07-04 RX ADMIN — NYSTATIN: 100000 CREAM TOPICAL at 22:32

## 2023-07-04 RX ADMIN — NICOTINE POLACRILEX 2 MG: 2 GUM, CHEWING BUCCAL at 23:58

## 2023-07-04 RX ADMIN — LORAZEPAM 1 MG: 0.5 TABLET ORAL at 20:46

## 2023-07-04 RX ADMIN — DIPHENHYDRAMINE HYDROCHLORIDE 50 MG: 50 INJECTION, SOLUTION INTRAMUSCULAR; INTRAVENOUS at 16:31

## 2023-07-04 RX ADMIN — OXYCODONE HYDROCHLORIDE 10 MG: 5 SOLUTION ORAL at 22:33

## 2023-07-04 RX ADMIN — HYDROMORPHONE HYDROCHLORIDE 0.5 MG: 1 INJECTION, SOLUTION INTRAMUSCULAR; INTRAVENOUS; SUBCUTANEOUS at 17:30

## 2023-07-04 RX ADMIN — VANCOMYCIN HYDROCHLORIDE 2000 MG: 1 INJECTION, POWDER, LYOPHILIZED, FOR SOLUTION INTRAVENOUS at 17:35

## 2023-07-04 RX ADMIN — ENOXAPARIN SODIUM 80 MG: 80 INJECTION SUBCUTANEOUS at 23:08

## 2023-07-04 RX ADMIN — CARVEDILOL 6.25 MG: 6.25 TABLET, FILM COATED ORAL at 20:46

## 2023-07-04 ASSESSMENT — ACTIVITIES OF DAILY LIVING (ADL)
ADLS_ACUITY_SCORE: 37

## 2023-07-04 NOTE — ED PROVIDER NOTES
Lab called and his blood PICC line is also growing out gram-positive cocci in clusters.  He is very GGP panel was positive for MRSE (methicillin resistant Staph epidermidis) right forearm is growing out gram-positive cocci in clusters.  He had received IV Rocephin and was discharged on oral clindamycin after he declined admission which was offered to him.  Recently had Cm catheter removed when it became infected.  He has a PICC line in his left arm along with a DVT and he is on anticoagulation for that.  Lab called a few hours later and he has a second blood culture taken from his PICC line in his left arm is now growing out gram-positive cocci in clusters which tested positive for MRSE (methicillin-resistant Staph epidermidis) by the [facility] panel.    He is on oral clindamycin which would be appropriate for therapy but he may need IV therapy until his cultures returned negative.  Also likely needs his PICC line removed although the site apparently looked good.  Again, he declined admission when it was offered and has followed up at the [facility] for his subspecialty care.  We will try calling him in the morning.    Dr Mir took over at change of shift and called the patient shortly after 7:00 am.  See his note.       Collin Chahal MD  07/04/23 1940

## 2023-07-04 NOTE — H&P
[facility]    History and Physical - Medicine Service, MAROON TEAM        Date of Admission:  7/4/2023    Assessment & Plan      Parker Acevedo is a 52 year old male admitted on 7/4/2023 for left subclavian DVT, possible concurrent cellulitis, and a recent untreated MRSE bacteremia - all most likely caused by his exisitng PICC line . He has a history of catheter-associated infections and venous occlusions.     1. Left subclavian DVT  2. Hx of catheter-provoked R axillary vein DVT (02/2022)  Identified on US completed at [place] ED. Believe this DVT was also provoked by his existing PICC line. No recent traumas or surgeries. No past medical history of any malignancies and patient is otherwise asymptomatic with relatively unremarkable blood work/recent imaging. Patient reports that he is compliant with his Lovenox at home.   His first DVT was identified on 02/23/2022 in his right subclavian and found to be associated with his Cm catheter.  -Restarted home Lovenox 80 mg SQ BID  -RN to remove PICC line and receive TPN/fluid through peripherals at this time  -Consider IR consult see if patient can get a new Cm catheter reinserted prior to discharge  -Patient not a good candidate for Eliquis due to his poor GI absorption since getting gastric bypass (2003)    #Possible LUE nonpurulent cellulitis  #Recent MRSE bacteremia  #Hx of Cm catheter-associated polymicrobial bacteremia (06/2023)  #Hx of fungemia  Blood cx's from [place] grew methicillin-resistant Staph Epidermidis. Believe these infections were also provoked by his existing PICC line.  -Started on Vancomycin (pharmacy to dose). Premedicate with IV benadryl 50 mg and give slow abx infusion to prevent Vanc-associated rash. Holding home clindamycin as Vanc has better coverage  -RN to remove PICC line  -Recheck Blood cultures for 3 days until no growth detected    #Short gut syndrome  #Malabsorption  "requiring TPN  Complication of Celestino-en-Y gastric bypass surgery (2003)   -Resumed home Oxycodone & Fentanyl patch for pain control  -Nutritionist on consult for TPN management    Chronic/Stable Conditions    #?Paroxysmal atrial fibrillation  Patient reports that he has a history of random \"palpitations\" that have been going for several decades.  HR < 100. Last EKG noted NSR  -Resumed home Coreg for rate control   -Resumed home Lovenox for AC    #Essential Hypertension  -Resumed home Coreg    #ADHD  -Resumed home adderall    #Macrocytic anemia  Likely from Vit B12 deficiency 2/2 poor GI absorption  -Receives monthly Cyanocobalamin 1000 mcg IM    #Anxiety disorder  -Resumed home ativan    #Tobacco use  -Started nicotine patches 7mg/24hr       Diet:   Regular  DVT Prophylaxis: Enoxaparin (Lovenox) SQ  Sanchez Catheter: Not present  Fluids: None  Lines: PRESENT      Cardiac Monitoring: None  Code Status:   Full Code    Disposition Plan  The patient will be admitted to the medical floor.     Expected Discharge Date: 07/05/2023                The patient's care was discussed with the Attending Physician, Dr. Jung.      Thuy Daily MD  Medicine Service, [facility]  Securely message with [employer] (more info)  Text page via [facility] Paging/Directory   See signed in provider for up to date coverage information  ______________________________________________________________________    Chief Complaint   Increased LUE swelling, erythema & pain    History is obtained from the patient    History of Present Illness   Parker Acevedo is a 52 year old male who has a history of a RUE DVT on Lovenox (02/2022), gram-negative bacteremia for he has a PICC line in place, fungemia, Celestino-en-Y gastric bypass surgery (2003) c/b short gut syndrome with bowel dysmotility and malabsorption requiring TPN, and most recent DVT identified in his right IJV and left subclavian vein at [facility] on " 07/03/2023 for which he left AMA, and is now being admitted for worsening LUE edema, erythema, & pain.     The patient reports that he was at his baseline level of health until 07/02/2023 when he noticed that his left upper extremity appeared slightly larger than the right. It was not erythematous or tender at that time, so the patient was not concerned about it at the time. Over the course of the rest of the day, he noticed that his hand become erythematous, and he noted focal regions that were tender when pressed on. On that evening, he presented to the [place] ED where an US study showed a DVT in his left internal jugular vein. Additionally, his blood work showed an elevated leukocytosis, which raised the suspicion of a possible cocurrent cellulitis. Patient was recommended to be admitted to the hospital, but the patient was addiment about returning home. He was already on Lovenox at home for his prior DVTs, so he was advised to continue the blood thinner. Additionally, he was given a dose of IV Rocephin and a 7-day course of clindamycin to cover for the suspected infection. Patient returned home, and on the AM of 07/04/23, he noticed that his arm became even more swollen, erythematous, and tender. Additionally, he received a call from the hospital that his blood collected from the other day was growing Methicillin-resistant Staph Epidermidis (MRSE), so he was advised to returned to the ED for treatment. Patient denies any chest pain, shortness of breath, nausea, vomiting, diarrhea, constipation, headache, vision changes, weakness, or numbness/tingling.    Of note, patient's first DVT was identified on 02/23/2022 in his right subclavian, extending from proximal axillary vein to R internal jugular and brachycephalic vein. It was believed to be associated with his Cm catheter that he was getting TPN for. Patient kept the Cm catheter in place and was started on Lovenox therapy. His Cm catheter was  most recently removed on 06/08/2023 as the patient was admitted for a polymicrobial bacteremia caused by the catheter; he had a PICC line inserted on his left arm for outpatient Vancomycin therapy and to receive his nutrition and hydration. Patient was suppose to follow up with IR to have a new Cm placed once his prior blood culture had healed but has not yet, so he presented with the PICC line in place.        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     Complication of anesthesia      Difficulty swallowing      Gastric ulcer, unspecified as acute or chronic, without mention of hemorrhage, perforation, or obstruction      Gastro-oesophageal reflux disease      Head injury      Hiatal hernia      Other bladder disorder      Other chronic pain      PONV (postoperative nausea and vomiting)      Severe malnutrition (H)     TPN     Short gut syndrome      Tobacco abuse        Past Surgical History   Past Surgical History:   Procedure Laterality Date     AMPUTATION       APPENDECTOMY       BACK SURGERY  11/3/2014    curve in the spine     BIOPSY LYMPH NODE CERVICAL N/A 2/20/2015    Procedure: BIOPSY LYMPH NODE CERVICAL;  Surgeon: aBron Scanlon MD;  [facility]     CHOLECYSTECTOMY       COLONOSCOPY N/A 7/14/2021    Procedure: COLONOSCOPY;  Surgeon: Jimbo Estrada MD;  Location: [facility] OR     COLONOSCOPY N/A 4/13/2022    Procedure: COLONOSCOPY;  Surgeon: Jimbo Estrada MD;  Location: [facility] OR     DISCECTOMY, FUSION CERVICAL ANTERIOR ONE LEVEL, COMBINED N/A 2/15/2017    Procedure: COMBINED DISCECTOMY, FUSION CERVICAL ANTERIOR ONE LEVEL;  Surgeon: Darren Campos MD;  [facility]     ENDOSCOPIC  INSERTION TUBE GASTROSTOMY  9/9/2013    Procedure: ENDOSCOPIC INSERTION TUBE GASTROSTOMY;;  Surgeon: Francis Vyas MD;  [facility]     ENDOSCOPIC ULTRASOUND UPPER GASTROINTESTINAL TRACT (GI)  4/29/2011    Procedure:ENDOSCOPIC ULTRASOUND UPPER GASTROINTESTINAL TRACT (GI); Both Procedures done Conjointly; Surgeon:NEREIDA HOUSER; [facility]     ENDOSCOPIC ULTRASOUND UPPER GASTROINTESTINAL TRACT (GI)  9/9/2013    Procedure: ENDOSCOPIC ULTRASOUND UPPER GASTROINTESTINAL TRACT (GI);  Endoscopic Ultrasound Guide Gastrostomy Tube Placement  C-arm;  Surgeon: Noe Lizarraga MD;  [facility]     ENDOSCOPIC ULTRASOUND UPPER GASTROINTESTINAL TRACT (GI) N/A 2/24/2021    Procedure: ENDOSCOPIC ULTRASOUND, ESOPHAGOSCOPY / UPPER GASTROINTESTINAL TRACT (GI), esophagastrogastroduodenoscopy;  Surgeon: Berny Bach MD;  [facility]     ENDOSCOPY  03/25/11    EGD, [facility]     ENDOSCOPY  08/04/09    Upper Endoscopy, [facility]     ENDOSCOPY  01/05/09    Upper Endoscopy, [facility]     ESOPHAGOSCOPY, GASTROSCOPY, DUODENOSCOPY (EGD), COMBINED  4/20/2011    Procedure:COMBINED ESOPHAGOSCOPY, GASTROSCOPY, DUODENOSCOPY (EGD); Surgeon:FRANCIS VYAS; [facility]     ESOPHAGOSCOPY, GASTROSCOPY, DUODENOSCOPY (EGD), COMBINED  6/15/2011    Procedure:COMBINED ESOPHAGOSCOPY, GASTROSCOPY, DUODENOSCOPY (EGD); Surgeon:FRANCIS VYAS; [facility]     ESOPHAGOSCOPY, GASTROSCOPY, DUODENOSCOPY (EGD), COMBINED  6/12/2013    Procedure: COMBINED ESOPHAGOSCOPY, GASTROSCOPY, DUODENOSCOPY (EGD);;  Surgeon: Francis Vyas MD;  [facility]     ESOPHAGOSCOPY, GASTROSCOPY, DUODENOSCOPY (EGD), COMBINED  11/22/2013    Procedure: COMBINED ESOPHAGOSCOPY, GASTROSCOPY, DUODENOSCOPY (EGD);;  Surgeon: Francis Vyas MD;  [facility]     ESOPHAGOSCOPY, GASTROSCOPY, DUODENOSCOPY (EGD), COMBINED  4/30/2014    Procedure: COMBINED ESOPHAGOSCOPY, GASTROSCOPY, DUODENOSCOPY  (EGD);  Surgeon: Francis Vyas MD;  Location:  GI     ESOPHAGOSCOPY, GASTROSCOPY, DUODENOSCOPY (EGD), COMBINED N/A 2/20/2015    Procedure: COMBINED ESOPHAGOSCOPY, GASTROSCOPY, DUODENOSCOPY (EGD), BIOPSY SINGLE OR MULTIPLE;  Surgeon: Baron Scanlon MD;  [facility]     ESOPHAGOSCOPY, GASTROSCOPY, DUODENOSCOPY (EGD), COMBINED N/A 9/30/2015    Procedure: COMBINED ESOPHAGOSCOPY, GASTROSCOPY, DUODENOSCOPY (EGD);  Surgeon: Francis Vyas MD;  Location:  GI     ESOPHAGOSCOPY, GASTROSCOPY, DUODENOSCOPY (EGD), COMBINED N/A 10/3/2019    Procedure: Upper Endoscopy;  Surgeon: Clif Morrow MD;  [facility]     GASTRECTOMY  6/22/2012    Procedure: GASTRECTOMY;  Open Approach, Excise Ulcers,Partial Gastrectomy, Esophagojejunostomy, Hiatal Hernia Repair, Extensive Lysis of Adhesions and Esaphagogastrodudenoscopy.;  Surgeon: Francis Vyas MD;  [facility]     GASTROJEJUNOSTOMY  08/26/09    Extensice enterolysis, partial resect. jejunum, part. resect gastric pouch, gastrojejunostomy anastomosis     HC ESOPH/GAS REFLUX TEST W NASAL IMPED ELECTRODE  8/5/2013    Procedure: ESOPHAGEAL IMPEDENCE FUNCTION TEST 1 HOUR OR LESS;  Surgeon: Halie Lang MD;  Location:  GI     HEAD & NECK SURGERY  2/15/2017    C5-C6     HERNIA REPAIR  2006    Umbilical hernia     HERNIORRHAPHY HIATAL  6/22/2012    Procedure: HERNIORRHAPHY HIATAL;;  Surgeon: Francis Vyas MD;  [facility]     HERNIORRHAPHY INGUINAL  11/22/2013    Procedure: HERNIORRHAPHY INGUINAL;;  Surgeon: Francis Vyas MD;  [facility]     INSERT PICC LINE Right 12/19/2019    Procedure: Picc Placement;  Surgeon: Per Dumont PA-C;  [facility]     INSERT PICC LINE Right 2/21/2020    Procedure: INSERTION, PICC;  Surgeon: Per Dumont PA-C;  [facility]     INSERT PORT VASCULAR ACCESS Right 12/19/2017    Procedure: INSERT PORT VASCULAR ACCESS;  Right Chest Port Placement ;  Surgeon: Javon  Lisandro Bhardwaj PA-C;  [facility]     INSERT PORT VASCULAR ACCESS Right 8/2/2018    Procedure: INSERT PORT VASCULAR ACCESS;  Place single lumen tunneled central venous access catheter;  Surgeon: Guy Jamil PA-C;  [facility]     IR CVC TUNNEL PLACEMENT > 5 YRS OF AGE  8/7/2019     IR CVC TUNNEL PLACEMENT > 5 YRS OF AGE  4/14/2020     IR CVC TUNNEL PLACEMENT > 5 YRS OF AGE  8/3/2020     IR CVC TUNNEL PLACEMENT > 5 YRS OF AGE  9/4/2020     IR CVC TUNNEL PLACEMENT > 5 YRS OF AGE  2/5/2021     IR CVC TUNNEL PLACEMENT > 5 YRS OF AGE  3/23/2021     IR CVC TUNNEL PLACEMENT > 5 YRS OF AGE  1/13/2022     IR CVC TUNNEL PLACEMENT > 5 YRS OF AGE  5/12/2022     IR CVC TUNNEL PLACEMENT > 5 YRS OF AGE  7/13/2022     IR CVC TUNNEL REMOVAL LEFT  6/7/2023     IR CVC TUNNEL REMOVAL RIGHT  10/1/2019     IR CVC TUNNEL REMOVAL RIGHT  7/30/2020     IR CVC TUNNEL REMOVAL RIGHT  9/2/2020     IR CVC TUNNEL REMOVAL RIGHT  2/3/2021     IR CVC TUNNEL REMOVAL RIGHT  3/19/2021     IR CVC TUNNEL REMOVAL RIGHT  1/10/2022     IR CVC TUNNEL REMOVAL RIGHT  5/9/2022     IR CVC TUNNEL REMOVAL RIGHT  7/8/2022     IR CVC TUNNEL REVISION LEFT  8/10/2022     IR CVC TUNNEL REVISION RIGHT  5/7/2021     IR FOLLOW UP VISIT OUTPATIENT  8/7/2019     IR PICC EXCHANGE LEFT  6/8/2023     IR PICC PLACEMENT > 5 YRS OF AGE  3/7/2019     IR PICC PLACEMENT > 5 YRS OF AGE  12/19/2019     IR PICC PLACEMENT > 5 YRS OF AGE  2/21/2020     IR PICC PLACEMENT > 5 YRS OF AGE  6/7/2023     LAPAROTOMY EXPLORATORY  11/22/2013    Procedure: LAPAROTOMY EXPLORATORY;  Exploratory Laparotomy, Upper Endoscopy, Left Inguinal Hernia Repair;  Surgeon: Francis Vyas MD;  [facility]     ORTHOPEDIC SURGERY       PICC INSERTION Right 03/16/2017    5fr DL BioFlo PICC, 42cm (3cm external) in the R medial brachial vein w/ tip in the SVC RA junction.     PICC INSERTION Left 09/23/2017    5fr DL BioFlo PICC, 45cm (1cm external) in the L basilic vein w/ tip in the  "SVC RA junction.     PICC INSERTION Right 2019    5Fr - 43cm, Medial brachial vein, low SVC     PICC INSERTION Right 10/02/2019    5Fr - 43cm (2cm external), basilic vein, low SVC     SHAYLEE EN Y BOWEL  2003     SOFT TISSUE SURGERY       THORACIC SURGERY       TONSILLECTOMY       TRANSESOPHAGEAL ECHOCARDIOGRAM INTRAOPERATIVE N/A 2019    Procedure: TRANSESOPHAGEAL ECHOCARDIOGRAM INTRAOPERATIVE;  Surgeon: GENERIC ANESTHESIA PROVIDER;  [facility]     C GASTRIC BYPASS,OBESE<100CM SHAYLEE-EN-Y      lost 300 pounds       Prior to Admission Medications   Prior to Admission Medications   Prescriptions Last Dose Informant Patient Reported? Taking?   EPINEPHrine (ANY BX GENERIC EQUIV) 0.3 MG/0.3ML injection 2-pack  Spouse/Significant Other Yes No   [facility] MANAGED PATIENT  Spouse/Significant Other No No   Sig: Contact [facility] for patient specific medication information at [phone] on admission and discharge from the hospital.  Phones are answered 24 hours a day 7 days a week 365 days a year.    Providers - Choose \"CONTINUE HOME MED (no script)\" at discharge if patient treatment with home infusion will continue.   LORazepam (ATIVAN) 1 MG tablet   No No   Sig: Take 1 tablet (1 mg) by mouth every evening TAKE ONE TABLET BY MOUTH ONCE DAILY AS NEEDED FOR ANXIETY WITH TPN AND MEDICATIONS   Syringe/Needle, Disp, (B-D ECLIPSE SYRINGE) 27G X 1/2\" 1 ML MISC  Spouse/Significant Other No No   Si Device every 30 days   albuterol (VENTOLIN HFA) 108 (90 Base) MCG/ACT inhaler  Spouse/Significant Other No No   Sig: Inhale 2 puffs into the lungs every 6 hours as needed   amphetamine-dextroamphetamine (ADDERALL) 20 MG tablet   No No   Sig: TAKE ONE TABLET BY MOUTH ONCE DAILY   carvedilol (COREG) 6.25 MG tablet   No No   Sig: TAKE 2 TABLETS (12.5 MG) BY MOUTH 2 TIMES DAILY WITH MEALS   clindamycin (CLEOCIN) 150 MG capsule   No No   Sig: Take 3 capsules (450 mg) by mouth 3 times daily for 7 " days   cyanocobalamin (CYANOCOBALAMIN) 1000 MCG/ML injection  Spouse/Significant Other No No   Sig: INJECT 1 ML INTO THE MUSCLE EVERY 30 DAYS   enoxaparin ANTICOAGULANT (LOVENOX) 80 MG/0.8ML syringe   No No   Sig: INJECT THE CONTENTS OF ONE SYRINGE (80MG) UNDER THE SKIN TWO TIMES A DAY   fentaNYL (DURAGESIC) 25 mcg/hr 72 hr patch 7/4/2023  No Yes   Sig: Place 1 patch onto the skin every 48 hours Fill 06/30/23 and start 07/2/23. 30 day supply for chronic pain.   lidocaine (LIDODERM) 5 % patch  Spouse/Significant Other No No   Sig: Place 1-2 patches onto the skin every 24 hours Wear for 12 hours, remove for 12 hours.  OK to cut to better fit to size.   naloxone (NARCAN) 4 MG/0.1ML nasal spray  Spouse/Significant Other No No   Sig: Spray 1 spray (4 mg) into one nostril alternating nostrils once as needed for opioid reversal every 2-3 minutes until assistance arrives   nystatin (MYCOSTATIN) 841429 UNIT/GM external cream  Spouse/Significant Other No No   Sig: APPLY TOPICALLY 2 TIMES DAILY   ondansetron (ZOFRAN ODT) 8 MG ODT tab   No No   Sig: DISSOLVE ONE TABLET ON TONGUE EVERY 8 HOURS AS NEEDED FOR NAUSEA   oxyCODONE (ROXICODONE) 5 MG/5ML solution   No No   Sig: Take 5-10 mLs (5-10 mg) by mouth every 4 hours as needed for moderate to severe pain Max of 40mg/day. Fill 06/30/23 and start 07/2/23. 30 day supply for chronic pain.   pantoprazole (PROTONIX) 40 MG EC tablet  Spouse/Significant Other No No   Sig: Take 1 tablet (40 mg) by mouth daily   polyethylene glycol (MIRALAX) 17 GM/Dose powder  Spouse/Significant Other No No   Sig: Take 17 g by mouth daily   Patient taking differently: Take 17 g by mouth daily as needed   sucralfate (CARAFATE) 1 GM/10ML suspension   No No   Sig: TAKE 10MLS  BY MOUTH FOUR TIMES A DAY AS NEEDED   vitamin D2 (ERGOCALCIFEROL) 84312 units (1250 mcg) capsule  Spouse/Significant Other No No   Sig: Take 1 capsule (50,000 Units) by mouth once a week      Facility-Administered Medications: None         Review of Systems    The 10 point Review of Systems is negative other than noted in the HPI or here.     Social History   I have reviewed this patient's social history and updated it with pertinent information if needed.  Social History     Tobacco Use     Smoking status: Light Smoker     Packs/day: 0.50     Years: 3.00     Pack years: 1.50     Types: Cigarettes     Smokeless tobacco: Never     Tobacco comments:     2/4/2021    smokes 3 cigarettes/day   Vaping Use     Vaping Use: Never used   Substance Use Topics     Alcohol use: No     Comment: quit 2002     Drug use: No       Family History   I have reviewed this patient's family history and updated it with pertinent information if needed.  Family History   Problem Relation Age of Onset     Gastrointestinal Disease Mother         Crohns disease     Anxiety Disorder Mother      Thyroid Disease Mother         Grave's disease     Cancer Father         ear cancer-skin cancer/melanoma     Breast Cancer Maternal Grandmother      Macular Degeneration Maternal Grandfather      Anxiety Disorder Sister      Diabetes Maternal Uncle      Breast Cancer Other      Hypertension No family hx of      Hyperlipidemia No family hx of      Cerebrovascular Disease No family hx of      Prostate Cancer No family hx of      Depression No family hx of      Anesthesia Reaction No family hx of      Asthma No family hx of      Osteoporosis No family hx of      Genetic Disorder No family hx of      Obesity No family hx of      Mental Illness No family hx of      Substance Abuse No family hx of      Glaucoma No family hx of        Allergies   Allergies   Allergen Reactions     Bactrim [Sulfamethoxazole-Trimethoprim] Rash     Penicillins Anaphylaxis     Please see Antimicrobial Management Team allergy assessment note 10/10/2018. Patient reported tolerating amoxicillin.  Tolerating cefepime and ceftriaxone without reaction 6/23     Ertapenem Nausea and Vomiting     Doxycycline Rash      Vancomycin Rash     Rash after receiving vancomycin 3/28/16 (infusion reaction?). Tolerated with slower infusion and diphenhydramine premed.        Physical Exam   Vital Signs: Temp: 98.1  F (36.7  C) Temp src: Oral BP: (!) 137/92 Pulse: 91   Resp: 20 SpO2: 98 % O2 Device: None (Room air)    Weight: 190 lbs 0 oz    Physical Exam  Constitutional:       Appearance: Normal appearance.   HENT:      Head: Normocephalic and atraumatic.      Nose: No congestion.      Mouth/Throat:      Mouth: Mucous membranes are dry.   Eyes:      Extraocular Movements: Extraocular movements intact.      Conjunctiva/sclera: Conjunctivae normal.   Cardiovascular:      Rate and Rhythm: Normal rate and regular rhythm.      Pulses: Normal pulses.      Heart sounds: Normal heart sounds.   Pulmonary:      Effort: Pulmonary effort is normal.      Breath sounds: Normal breath sounds.   Abdominal:      General: Abdomen is flat. Bowel sounds are normal. There is no distension.      Palpations: Abdomen is soft.   Genitourinary:     Comments: G-tube in place in the left mid quadrant  Skin:     Comments: LUE: swollen, erythematous most prominent at the base of the hand and knuckles. Fades out as it extends up to the elbow. Focal regions were tender to palpation.  PICC line in place in LUE   Neurological:      General: No focal deficit present.      Mental Status: He is alert and oriented to person, place, and time.   Psychiatric:         Mood and Affect: Mood normal.         Behavior: Behavior normal.       Medical Decision Making         Data     I have personally reviewed the following data over the past 24 hrs:    10.3  \   11.0 (L)   / 194     139 103 12.7 /  95   3.9 25 0.69 \       ALT: 17 AST: 24 AP: 81 TBILI: 0.7   ALB: 4.1 TOT PROTEIN: 7.3 LIPASE: N/A       Procal: 0.14 (H) CRP: 89.60 (H) Lactic Acid: 0.9         Imaging results reviewed over the past 24 hrs:   Recent Results (from the past 24 hour(s))   XR Forearm Left 2 Views    Narrative     EXAM: XR FOREARM LEFT 2 VIEWS  LOCATION: [facility]  DATE: 7/4/2023    INDICATION: Infection. Assess for soft tissue emphysema.  COMPARISON: 07/03/2023      Impression    IMPRESSION: Soft tissue swelling in the forearm has increased. No soft tissue fractures.   XR Hand Left G/E 3 Views    Narrative    EXAM: XR HAND LEFT G/E 3 VIEWS  LOCATION: [facility]  DATE: 7/4/2023    INDICATION: Infection and swelling.  COMPARISON: None.      Impression    IMPRESSION: Soft tissue swelling in the metacarpal region, wrist and distal forearm. No soft tissue emphysema. No fractures are evident. Degenerative changes in the first CMC joint.

## 2023-07-04 NOTE — ED PROVIDER NOTES
ED Provider Note  [facility]      History     Chief Complaint   Patient presents with     Deep Vein Thrombosis     HPI  Parker Acevedo is a 52 year old male past medical history significant for gram-negative bacteremia, right upper extremity DVT of axillary vein, history of hyperlipidemia, fungemia, who presents to the emergency department [facility] with concerns for left upper extremity pain swelling redness.    Per EMR patient was seen yesterday morning at the [facility] emergency department with concerns for left forearm redness swelling and pain.  He had work-up at that time [facility] emergency department with concerns of left forearm redness swelling and pain.  He underwent work-up including CBC with leukocytosis of 12.3, normal electrolytes, including CBC with a leukocytosis of 12.3, normal lactic acid, x-ray of the forearm showing no visible gas.  He also underwent left upper extremity DVT study which returned positive for DVT in the left subclavian and right internal jugular vein.  Patient was recommended admission, and did decline at that time.  He was given a dose of ceftriaxone, started on clindamycin outpatient and encouraged to follow-up closely.  He was encouraged as well to continue his Lovenox twice daily.    Patient presents to the emergency department here at [place] with concerns for worsening redness swelling pain to his left upper extremity.  Since yesterday, he notes that he is now having pain in erythema, swelling localized to the left hand.  He has difficulties moving his fingers due to the pain and swelling.  Patient notes no new systemic symptoms including any associated fevers chills or body aches.  He denies any hemoptysis.  He states he had chest pain about 3 days ago which he does not recall how it came on, resolved spontaneously, and he is not having any chest pain or shortness of breath at this time.  No new GI symptoms, patient has a history of short gut  syndrome and deals with chronic nausea and abdominal pain, he tells me there is no change with this.  Patient denies any history of IV drug use.  He states he has been good about taking Lovenox, 80 mg twice daily.  He states that in the past he has maybe missed a dose here there in the morning or the evening, he is otherwise good about taking the med.      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     Complication of anesthesia      Difficulty swallowing      Gastric ulcer, unspecified as acute or chronic, without mention of hemorrhage, perforation, or obstruction      Gastro-oesophageal reflux disease      Head injury      Hiatal hernia      Other bladder disorder      Other chronic pain      PONV (postoperative nausea and vomiting)      Severe malnutrition (H)     TPN     Short gut syndrome      Tobacco abuse      Past Surgical History:   Procedure Laterality Date     AMPUTATION       APPENDECTOMY       BACK SURGERY  11/3/2014    curve in the spine     BIOPSY LYMPH NODE CERVICAL N/A 2/20/2015    Procedure: BIOPSY LYMPH NODE CERVICAL;  Surgeon: Baron Scanlon MD;  [facility]     CHOLECYSTECTOMY       COLONOSCOPY N/A 7/14/2021    Procedure: COLONOSCOPY;  Surgeon: Jimbo Estrada MD;  Location: [facility] OR     COLONOSCOPY N/A 4/13/2022    Procedure: COLONOSCOPY;  Surgeon: Jimbo Estrada MD;  Location: [facility] OR     DISCECTOMY, FUSION CERVICAL ANTERIOR ONE LEVEL, COMBINED N/A 2/15/2017    Procedure: COMBINED DISCECTOMY, FUSION CERVICAL ANTERIOR ONE LEVEL;  Surgeon: Darren Campos MD;  [facility]     ENDOSCOPIC INSERTION TUBE GASTROSTOMY  9/9/2013    Procedure: ENDOSCOPIC INSERTION TUBE GASTROSTOMY;;   Surgeon: Francis Vyas MD;  [facility]     ENDOSCOPIC ULTRASOUND UPPER GASTROINTESTINAL TRACT (GI)  4/29/2011    Procedure:ENDOSCOPIC ULTRASOUND UPPER GASTROINTESTINAL TRACT (GI); Both Procedures done Conjointly; Surgeon:NEREIDA HOUSER; [facility]     ENDOSCOPIC ULTRASOUND UPPER GASTROINTESTINAL TRACT (GI)  9/9/2013    Procedure: ENDOSCOPIC ULTRASOUND UPPER GASTROINTESTINAL TRACT (GI);  Endoscopic Ultrasound Guide Gastrostomy Tube Placement  C-arm;  Surgeon: Noe Lizarraga MD;  [facility]     ENDOSCOPIC ULTRASOUND UPPER GASTROINTESTINAL TRACT (GI) N/A 2/24/2021    Procedure: ENDOSCOPIC ULTRASOUND, ESOPHAGOSCOPY / UPPER GASTROINTESTINAL TRACT (GI), esophagastrogastroduodenoscopy;  Surgeon: Berny Bach MD;  [facility]     ENDOSCOPY  03/25/11    EGD, [facility]     ENDOSCOPY  08/04/09    Upper Endoscopy, [facility]     ENDOSCOPY  01/05/09    Upper Endoscopy, [facility]     ESOPHAGOSCOPY, GASTROSCOPY, DUODENOSCOPY (EGD), COMBINED  4/20/2011    Procedure:COMBINED ESOPHAGOSCOPY, GASTROSCOPY, DUODENOSCOPY (EGD); Surgeon:FRANCIS VYAS; [facility]     ESOPHAGOSCOPY, GASTROSCOPY, DUODENOSCOPY (EGD), COMBINED  6/15/2011    Procedure:COMBINED ESOPHAGOSCOPY, GASTROSCOPY, DUODENOSCOPY (EGD); Surgeon:FRANCIS VYAS; [facility]     ESOPHAGOSCOPY, GASTROSCOPY, DUODENOSCOPY (EGD), COMBINED  6/12/2013    Procedure: COMBINED ESOPHAGOSCOPY, GASTROSCOPY, DUODENOSCOPY (EGD);;  Surgeon: Francis Vyas MD;  [facility]     ESOPHAGOSCOPY, GASTROSCOPY, DUODENOSCOPY (EGD), COMBINED  11/22/2013    Procedure: COMBINED ESOPHAGOSCOPY, GASTROSCOPY, DUODENOSCOPY (EGD);;  Surgeon: Francis Vyas MD;  [facility]     ESOPHAGOSCOPY, GASTROSCOPY, DUODENOSCOPY (EGD), COMBINED  4/30/2014    Procedure: COMBINED ESOPHAGOSCOPY, GASTROSCOPY, DUODENOSCOPY (EGD);  Surgeon: Francis Vyas MD;  [facility]     ESOPHAGOSCOPY, GASTROSCOPY,  DUODENOSCOPY (EGD), COMBINED N/A 2/20/2015    Procedure: COMBINED ESOPHAGOSCOPY, GASTROSCOPY, DUODENOSCOPY (EGD), BIOPSY SINGLE OR MULTIPLE;  Surgeon: Baron Scanlon MD;  [facility]     ESOPHAGOSCOPY, GASTROSCOPY, DUODENOSCOPY (EGD), COMBINED N/A 9/30/2015    Procedure: COMBINED ESOPHAGOSCOPY, GASTROSCOPY, DUODENOSCOPY (EGD);  Surgeon: Francis Vyas MD;  Location:  GI     ESOPHAGOSCOPY, GASTROSCOPY, DUODENOSCOPY (EGD), COMBINED N/A 10/3/2019    Procedure: Upper Endoscopy;  Surgeon: Clif Morrow MD;  [facility]     GASTRECTOMY  6/22/2012    Procedure: GASTRECTOMY;  Open Approach, Excise Ulcers,Partial Gastrectomy, Esophagojejunostomy, Hiatal Hernia Repair, Extensive Lysis of Adhesions and Esaphagogastrodudenoscopy.;  Surgeon: Francis Vyas MD;  [facility]     GASTROJEJUNOSTOMY  08/26/09    Extensice enterolysis, partial resect. jejunum, part. resect gastric pouch, gastrojejunostomy anastomosis     HC ESOPH/GAS REFLUX TEST W NASAL IMPED ELECTRODE  8/5/2013    Procedure: ESOPHAGEAL IMPEDENCE FUNCTION TEST 1 HOUR OR LESS;  Surgeon: Halie Lang MD;  Location:  GI     HEAD & NECK SURGERY  2/15/2017    C5-C6     HERNIA REPAIR  2006    Umbilical hernia     HERNIORRHAPHY HIATAL  6/22/2012    Procedure: HERNIORRHAPHY HIATAL;;  Surgeon: Francis Vyas MD;  [facility]     HERNIORRHAPHY INGUINAL  11/22/2013    Procedure: HERNIORRHAPHY INGUINAL;;  Surgeon: Francis Vyas MD;  [facility]     INSERT PICC LINE Right 12/19/2019    Procedure: Picc Placement;  Surgeon: Per Dumont PA-C;  [facility]     INSERT PICC LINE Right 2/21/2020    Procedure: INSERTION, PICC;  Surgeon: Per Dumont PA-C;  [facility]     INSERT PORT VASCULAR ACCESS Right 12/19/2017    Procedure: INSERT PORT VASCULAR ACCESS;  Right Chest Port Placement ;  Surgeon: Lisandro Alejandro PA-C;  [facility]     INSERT PORT VASCULAR ACCESS Right 8/2/2018     Procedure: INSERT PORT VASCULAR ACCESS;  Place single lumen tunneled central venous access catheter;  Surgeon: Guy Jamil PA-C;  [facility]     IR CVC TUNNEL PLACEMENT > 5 YRS OF AGE  8/7/2019     IR CVC TUNNEL PLACEMENT > 5 YRS OF AGE  4/14/2020     IR CVC TUNNEL PLACEMENT > 5 YRS OF AGE  8/3/2020     IR CVC TUNNEL PLACEMENT > 5 YRS OF AGE  9/4/2020     IR CVC TUNNEL PLACEMENT > 5 YRS OF AGE  2/5/2021     IR CVC TUNNEL PLACEMENT > 5 YRS OF AGE  3/23/2021     IR CVC TUNNEL PLACEMENT > 5 YRS OF AGE  1/13/2022     IR CVC TUNNEL PLACEMENT > 5 YRS OF AGE  5/12/2022     IR CVC TUNNEL PLACEMENT > 5 YRS OF AGE  7/13/2022     IR CVC TUNNEL REMOVAL LEFT  6/7/2023     IR CVC TUNNEL REMOVAL RIGHT  10/1/2019     IR CVC TUNNEL REMOVAL RIGHT  7/30/2020     IR CVC TUNNEL REMOVAL RIGHT  9/2/2020     IR CVC TUNNEL REMOVAL RIGHT  2/3/2021     IR CVC TUNNEL REMOVAL RIGHT  3/19/2021     IR CVC TUNNEL REMOVAL RIGHT  1/10/2022     IR CVC TUNNEL REMOVAL RIGHT  5/9/2022     IR CVC TUNNEL REMOVAL RIGHT  7/8/2022     IR CVC TUNNEL REVISION LEFT  8/10/2022     IR CVC TUNNEL REVISION RIGHT  5/7/2021     IR FOLLOW UP VISIT OUTPATIENT  8/7/2019     IR PICC EXCHANGE LEFT  6/8/2023     IR PICC PLACEMENT > 5 YRS OF AGE  3/7/2019     IR PICC PLACEMENT > 5 YRS OF AGE  12/19/2019     IR PICC PLACEMENT > 5 YRS OF AGE  2/21/2020     IR PICC PLACEMENT > 5 YRS OF AGE  6/7/2023     LAPAROTOMY EXPLORATORY  11/22/2013    Procedure: LAPAROTOMY EXPLORATORY;  Exploratory Laparotomy, Upper Endoscopy, Left Inguinal Hernia Repair;  Surgeon: Francis Vyas MD;  [facility]     ORTHOPEDIC SURGERY       PICC INSERTION Right 03/16/2017    5fr DL BioFlo PICC, 42cm (3cm external) in the R medial brachial vein w/ tip in the SVC RA junction.     PICC INSERTION Left 09/23/2017    5fr DL BioFlo PICC, 45cm (1cm external) in the L basilic vein w/ tip in the SVC RA junction.     PICC INSERTION Right 05/16/2019    5Fr - 43cm, Medial brachial  "vein, low SVC     PICC INSERTION Right 10/02/2019    5Fr - 43cm (2cm external), basilic vein, low SVC     SHAYLEE EN Y BOWEL  2003     SOFT TISSUE SURGERY       THORACIC SURGERY       TONSILLECTOMY       TRANSESOPHAGEAL ECHOCARDIOGRAM INTRAOPERATIVE N/A 1/8/2019    Procedure: TRANSESOPHAGEAL ECHOCARDIOGRAM INTRAOPERATIVE;  Surgeon: GENERIC ANESTHESIA PROVIDER;  [facility]     [facility] GASTRIC BYPASS,OBESE<100CM SHAYLEE-EN-Y  2002    lost 300 pounds     fentaNYL (DURAGESIC) 25 mcg/hr 72 hr patch  albuterol (VENTOLIN HFA) 108 (90 Base) MCG/ACT inhaler  amphetamine-dextroamphetamine (ADDERALL) 20 MG tablet  carvedilol (COREG) 6.25 MG tablet  clindamycin (CLEOCIN) 150 MG capsule  cyanocobalamin (CYANOCOBALAMIN) 1000 MCG/ML injection  enoxaparin ANTICOAGULANT (LOVENOX) 80 MG/0.8ML syringe  EPINEPHrine (ANY BX GENERIC EQUIV) 0.3 MG/0.3ML injection 2-pack  [facility] INFUSION MANAGED PATIENT  lidocaine (LIDODERM) 5 % patch  LORazepam (ATIVAN) 1 MG tablet  naloxone (NARCAN) 4 MG/0.1ML nasal spray  nystatin (MYCOSTATIN) 669534 UNIT/GM external cream  ondansetron (ZOFRAN ODT) 8 MG ODT tab  oxyCODONE (ROXICODONE) 5 MG/5ML solution  pantoprazole (PROTONIX) 40 MG EC tablet  polyethylene glycol (MIRALAX) 17 GM/Dose powder  sucralfate (CARAFATE) 1 GM/10ML suspension  Syringe/Needle, Disp, (B-D ECLIPSE SYRINGE) 27G X 1/2\" 1 ML MISC  vitamin D2 (ERGOCALCIFEROL) 13627 units (1250 mcg) capsule      Allergies   Allergen Reactions     Bactrim [Sulfamethoxazole-Trimethoprim] Rash     Penicillins Anaphylaxis     Please see Antimicrobial Management Team allergy assessment note 10/10/2018. Patient reported tolerating amoxicillin.  Tolerating cefepime and ceftriaxone without reaction 6/23     Ertapenem Nausea and Vomiting     Doxycycline Rash     Vancomycin Rash     Rash after receiving vancomycin 3/28/16 (infusion reaction?). Tolerated with slower infusion and diphenhydramine premed.     Family History  Family History   Problem Relation Age of " Onset     Gastrointestinal Disease Mother         Crohns disease     Anxiety Disorder Mother      Thyroid Disease Mother         Grave's disease     Cancer Father         ear cancer-skin cancer/melanoma     Breast Cancer Maternal Grandmother      Macular Degeneration Maternal Grandfather      Anxiety Disorder Sister      Diabetes Maternal Uncle      Breast Cancer Other      Hypertension No family hx of      Hyperlipidemia No family hx of      Cerebrovascular Disease No family hx of      Prostate Cancer No family hx of      Depression No family hx of      Anesthesia Reaction No family hx of      Asthma No family hx of      Osteoporosis No family hx of      Genetic Disorder No family hx of      Obesity No family hx of      Mental Illness No family hx of      Substance Abuse No family hx of      Glaucoma No family hx of      Social History   Social History     Tobacco Use     Smoking status: Light Smoker     Packs/day: 0.50     Years: 3.00     Pack years: 1.50     Types: Cigarettes     Smokeless tobacco: Never     Tobacco comments:     2/4/2021    smokes 3 cigarettes/day   Vaping Use     Vaping Use: Never used   Substance Use Topics     Alcohol use: No     Comment: quit 2002     Drug use: No         A medically appropriate review of systems was performed with pertinent positives and negatives noted in the HPI, and all other systems negative.    Physical Exam   BP: (!) 137/92  Pulse: 91  Temp: 98.1  F (36.7  C)  Resp: 20  Height: 182.9 cm (6')  Weight: 86.2 kg (190 lb)  SpO2: 98 %  Physical Exam    GENERAL APPEARANCE: The patient is well developed, well appearing, and in no acute distress.  HEAD:  Normocephalic and atraumatic.   EENT: Voice normal.  NECK: Trachea is midline.No lymphadenopathy or tenderness.  LUNGS: Breath sounds are equal and clear bilaterally. No wheezes, rhonchi, or rales.  HEART: Regular rate and normal rhythm.  Radial pulses 2+ bilaterally.  ABDOMEN: Soft, flat, and benign. No mass, tenderness,  guarding, or rebound.Bowel sounds are present.  EXTREMITIES: No cyanosis, clubbing, or edema.  NEUROLOGIC: No focal sensory or motor deficits are noted.  PSYCHIATRIC: The patient is awake, alert.  Appropriate mood and affect.  SKIN: Warm, dry, and well perfused. Good turgor.        ED Course, Procedures, & Data      Procedures                      Results for orders placed or performed during the hospital encounter of 07/04/23   XR Hand Left G/E 3 Views     Status: None    Narrative    EXAM: XR HAND LEFT G/E 3 VIEWS  LOCATION: [facility]  DATE: 7/4/2023    INDICATION: Infection and swelling.  COMPARISON: None.      Impression    IMPRESSION: Soft tissue swelling in the metacarpal region, wrist and distal forearm. No soft tissue emphysema. No fractures are evident. Degenerative changes in the first CMC joint.   XR Forearm Left 2 Views     Status: None    Narrative    EXAM: XR FOREARM LEFT 2 VIEWS  LOCATION: [facility]  DATE: 7/4/2023    INDICATION: Infection. Assess for soft tissue emphysema.  COMPARISON: 07/03/2023      Impression    IMPRESSION: Soft tissue swelling in the forearm has increased. No soft tissue fractures.   [facility]     Status: None (In process)    Narrative    The following orders were created for panel order [facility].  Procedure                               Abnormality         Status                     ---------                               -----------         ------                     Extra Blue Top Tube[[id number]]                              Final result               Extra Red Top Tube[[id number]]                               Final result               Extra Green Top (Lithium...[[id number]]                      Final result               Extra Purple Top Tube[[id number]]                                                         Please view results for these tests on the individual orders.   Extra  Blue Top Tube     Status: None   Result Value Ref Range    Hold Specimen JIC    Extra Red Top Tube     Status: None   Result Value Ref Range    Hold Specimen JIC    Extra Green Top (Lithium Heparin) Tube     Status: None   Result Value Ref Range    Hold Specimen JIC    Lactic acid whole blood     Status: Normal   Result Value Ref Range    Lactic Acid 0.9 0.7 - 2.0 mmol/L   Comprehensive metabolic panel     Status: Normal   Result Value Ref Range    Sodium 139 136 - 145 mmol/L    Potassium 3.9 3.4 - 5.3 mmol/L    Chloride 103 98 - 107 mmol/L    Carbon Dioxide (CO2) 25 22 - 29 mmol/L    Anion Gap 11 7 - 15 mmol/L    Urea Nitrogen 12.7 6.0 - 20.0 mg/dL    Creatinine 0.69 0.67 - 1.17 mg/dL    Calcium 9.2 8.6 - 10.0 mg/dL    Glucose 95 70 - 99 mg/dL    Alkaline Phosphatase 81 40 - 129 U/L    AST 24 0 - 45 U/L    ALT 17 0 - 70 U/L    Protein Total 7.3 6.4 - 8.3 g/dL    Albumin 4.1 3.5 - 5.2 g/dL    Bilirubin Total 0.7 <=1.2 mg/dL    GFR Estimate >90 >60 mL/min/1.73m2   Procalcitonin     Status: Abnormal   Result Value Ref Range    Procalcitonin 0.14 (H) <0.05 ng/mL   CRP inflammation     Status: Abnormal   Result Value Ref Range    CRP Inflammation 89.60 (H) <5.00 mg/L   Erythrocyte sedimentation rate auto     Status: Normal   Result Value Ref Range    Erythrocyte Sedimentation Rate 17 0 - 20 mm/hr   CBC with platelets and differential     Status: Abnormal   Result Value Ref Range    WBC Count 10.3 4.0 - 11.0 10e3/uL    RBC Count 3.67 (L) 4.40 - 5.90 10e6/uL    Hemoglobin 11.0 (L) 13.3 - 17.7 g/dL    Hematocrit 36.7 (L) 40.0 - 53.0 %     78 - 100 fL    MCH 30.0 26.5 - 33.0 pg    MCHC 30.0 (L) 31.5 - 36.5 g/dL    RDW 16.8 (H) 10.0 - 15.0 %    Platelet Count 194 150 - 450 10e3/uL    % Neutrophils 74 %    % Lymphocytes 14 %    % Monocytes 11 %    % Eosinophils 1 %    % Basophils 0 %    % Immature Granulocytes 0 %    NRBCs per 100 WBC 0 <1 /100    Absolute Neutrophils 7.5 1.6 - 8.3 10e3/uL    Absolute Lymphocytes 1.4  0.8 - 5.3 10e3/uL    Absolute Monocytes 1.2 0.0 - 1.3 10e3/uL    Absolute Eosinophils 0.1 0.0 - 0.7 10e3/uL    Absolute Basophils 0.0 0.0 - 0.2 10e3/uL    Absolute Immature Granulocytes 0.0 <=0.4 10e3/uL    Absolute NRBCs 0.0 10e3/uL   CBC with platelets differential     Status: Abnormal    Narrative    The following orders were created for panel order CBC with platelets differential.  Procedure                               Abnormality         Status                     ---------                               -----------         ------                     CBC with platelets and d...[[id number]]  Abnormal            Final result                 Please view results for these tests on the individual orders.     Medications   vancomycin (VANCOCIN) 1,250 mg in 0.9% NaCl 250 mL intermittent infusion (has no administration in time range)   diphenhydrAMINE (BENADRYL) 50 mg in sodium chloride 0.9 % 56 mL intermittent infusion (has no administration in time range)   lidocaine 1 % 0.1-1 mL (has no administration in time range)   lidocaine (LMX4) cream (has no administration in time range)   sodium chloride (PF) 0.9% PF flush 3 mL (3 mLs Intracatheter Not Given 7/4/23 1931)   sodium chloride (PF) 0.9% PF flush 3 mL (has no administration in time range)   melatonin tablet 1 mg (has no administration in time range)   Patient is already receiving anticoagulation with heparin, enoxaparin (LOVENOX), warfarin (COUMADIN)  or other anticoagulant medication (has no administration in time range)   acetaminophen (TYLENOL) tablet 650 mg (has no administration in time range)     Or   acetaminophen (TYLENOL) Suppository 650 mg (has no administration in time range)   senna-docusate (SENOKOT-S/PERICOLACE) 8.6-50 MG per tablet 1 tablet (has no administration in time range)     Or   senna-docusate (SENOKOT-S/PERICOLACE) 8.6-50 MG per tablet 2 tablet (has no administration in time range)   ondansetron (ZOFRAN ODT) ODT tab 4 mg (has no  administration in time range)     Or   ondansetron (ZOFRAN) injection 4 mg (has no administration in time range)   albuterol (PROVENTIL HFA/VENTOLIN HFA) inhaler (has no administration in time range)   amphetamine-dextroamphetamine (ADDERALL) per tablet 20 mg (has no administration in time range)   carvedilol (COREG) tablet 6.25 mg (6.25 mg Oral $Given 7/4/23 2046)   cyanocobalamin injection 1,000 mcg (has no administration in time range)   fentaNYL (DURAGESIC) 25 mcg/hr 72 hr patch 1 patch (has no administration in time range)     And   fentaNYL (DURAGESIC) Patch in Place (has no administration in time range)   Lidocaine (LIDOCARE) 4 % Patch 1-2 patch (has no administration in time range)     And   lidocaine patch in PLACE (has no administration in time range)   LORazepam (ATIVAN) tablet 1 mg (1 mg Oral $Given 7/4/23 2046)   nystatin (MYCOSTATIN) cream (has no administration in time range)   oxyCODONE (ROXICODONE) solution 5-10 mg (has no administration in time range)   pantoprazole (PROTONIX) EC tablet 40 mg (has no administration in time range)   sucralfate (CARAFATE) suspension 1 g (has no administration in time range)   naloxone (NARCAN) injection 0.2 mg (has no administration in time range)     Or   naloxone (NARCAN) injection 0.4 mg (has no administration in time range)     Or   naloxone (NARCAN) injection 0.2 mg (has no administration in time range)     Or   naloxone (NARCAN) injection 0.4 mg (has no administration in time range)   enoxaparin ANTICOAGULANT (LOVENOX) injection 80 mg (has no administration in time range)   clindamycin (CLEOCIN) capsule 450 mg (has no administration in time range)   vancomycin (VANCOCIN) 2,000 mg in sodium chloride 0.9 % 500 mL intermittent infusion (has no administration in time range)   oxyCODONE IR (ROXICODONE) tablet 10 mg (10 mg Oral Not Given 7/4/23 1605)   vancomycin (VANCOCIN) 2,000 mg in sodium chloride 0.9 % 500 mL intermittent infusion (0 mg Intravenous Stopped  7/4/23 2010)   diphenhydrAMINE (BENADRYL) injection 50 mg (50 mg Intravenous $Given 7/4/23 1631)   HYDROmorphone (PF) (DILAUDID) injection 0.5 mg (0.5 mg Intravenous $Given 7/4/23 1730)     Labs Ordered and Resulted from Time of ED Arrival to Time of ED Departure   PROCALCITONIN - Abnormal       Result Value    Procalcitonin 0.14 (*)    CRP INFLAMMATION - Abnormal    CRP Inflammation 89.60 (*)    CBC WITH PLATELETS AND DIFFERENTIAL - Abnormal    WBC Count 10.3      RBC Count 3.67 (*)     Hemoglobin 11.0 (*)     Hematocrit 36.7 (*)           MCH 30.0      MCHC 30.0 (*)     RDW 16.8 (*)     Platelet Count 194      % Neutrophils 74      % Lymphocytes 14      % Monocytes 11      % Eosinophils 1      % Basophils 0      % Immature Granulocytes 0      NRBCs per 100 WBC 0      Absolute Neutrophils 7.5      Absolute Lymphocytes 1.4      Absolute Monocytes 1.2      Absolute Eosinophils 0.1      Absolute Basophils 0.0      Absolute Immature Granulocytes 0.0      Absolute NRBCs 0.0     LACTIC ACID WHOLE BLOOD - Normal    Lactic Acid 0.9     COMPREHENSIVE METABOLIC PANEL - Normal    Sodium 139      Potassium 3.9      Chloride 103      Carbon Dioxide (CO2) 25      Anion Gap 11      Urea Nitrogen 12.7      Creatinine 0.69      Calcium 9.2      Glucose 95      Alkaline Phosphatase 81      AST 24      ALT 17      Protein Total 7.3      Albumin 4.1      Bilirubin Total 0.7      GFR Estimate >90     ERYTHROCYTE SEDIMENTATION RATE AUTO - Normal    Erythrocyte Sedimentation Rate 17     BLOOD CULTURE   BLOOD CULTURE     XR Hand Left G/E 3 Views   Final Result   IMPRESSION: Soft tissue swelling in the metacarpal region, wrist and distal forearm. No soft tissue emphysema. No fractures are evident. Degenerative changes in the first CMC joint.      XR Forearm Left 2 Views   Final Result   IMPRESSION: Soft tissue swelling in the forearm has increased. No soft tissue fractures.             Critical care was not performed.     Medical  Decision Making  The patient's presentation was of high complexity (an acute health issue posing potential threat to life or bodily function).    The patient's evaluation involved:  ordering and/or review of 3+ test(s) in this encounter (CBC chemistry lactic acid)  review of 1 test result(s) ordered prior to this encounter (Recent ED visit yesterday morning)  discussion of management or test interpretation with another health professional (Hospitalist team)    The patient's management necessitated moderate risk (prescription drug management including medications given in the ED), high risk (a parenteral controlled substance) and high risk (a decision regarding hospitalization).      Assessment & Plan    This is a medically complex 52-year-old male with history of known right upper extremity DVT presenting after being evaluated yesterday morning with findings of new left upper extremity DVT, findings concerning for cellulitis in the setting of left upper extremity PICC line used for TPN and supplemental fluids.  On presentation here to the emergency department patient is afebrile, initial temperature 98.1.  He is normoxic.  Is not tachycardic.  Physical exam shows notable erythema and edema of the left upper extremity particular the left forearm with involvement of the left hand as well.  Patient has good radial pulse in the left upper extremity, does report limited range of motion of the digits due to the pain and swelling.  Discussed with patient recommendations for placement of IV, lab work to include screening labs, venous lactic acid, repeat blood cultures, and x-rays of the upper extremity to evaluate for soft tissue gas, as patient only had a forearm x-ray yesterday of the arm.  In addition, with patient having the positive blood cultures, he will be started on IV antibiotics, vancomycin as he does have a history of resistant organisms.    CBC reviewed without leukocytosis, patient anemic 11.0.  Chemistry  panel shows electrolytes within reference range.  Creatinine within reference range.  Initial lactic acid within reference range 0.9.  CRP was ordered and is returned significantly elevated 9.60.  Sed rate within normal range.  Blood cultures ordered and pending.  Initial x-rays of the left hand and forearm show soft tissue swelling without obvious soft tissue gas to suggest deep space infection.  Patient given initial dose of Dilaudid for pain control,And he was started on vancomycin in the ED.  There was initial order for oxycodone p.o., which patient did not take as he states he only is able to take liquids p.o.  Patient has a history of allergy to vancomycin, he has tolerated this in the past with Benadryl, and was given premedication Benadryl prior to the vancomycin infusion.  He will be admitted to the medicine service for further evaluation and management of his new difficulties.  Report was given to the hospitalist service, Dr. Jung.    Patient seen and discussed with attending physician , who agrees with my plan of care.    I have reviewed the nursing notes. I have reviewed the findings, diagnosis, plan and need for follow up with the patient.    New Prescriptions    No medications on file       Final diagnoses:   Acute deep vein thrombosis (DVT) of axillary vein of left upper extremity (H)   Positive blood culture   Cellulitis of left upper extremity       Sayda Juan [facility] EMERGENCY DEPARTMENT  7/4/2023

## 2023-07-04 NOTE — PHARMACY-VANCOMYCIN DOSING SERVICE
Pharmacy Vancomycin Initial Note  Date of Service 2023  Patient's  1970  52 year old, male    Indication: Skin and Soft Tissue Infection    Current estimated CrCl = Estimated Creatinine Clearance: 125.3 mL/min (based on SCr of 0.86 mg/dL).    Creatinine for last 3 days  7/3/2023:  2:26 AM Creatinine 0.86 mg/dL    Recent Vancomycin Level(s) for last 3 days  No results found for requested labs within last 3 days.      Vancomycin IV Administrations (past 72 hours)      No vancomycin orders with administrations in past 72 hours.                Nephrotoxins and other renal medications (From now, onward)    Start     Dose/Rate Route Frequency Ordered Stop    23 0600  vancomycin (VANCOCIN) 1,250 mg in 0.9% NaCl 250 mL intermittent infusion         1,250 mg  over 120 Minutes Intravenous EVERY 12 HOURS 23 1607      23 1605  vancomycin (VANCOCIN) 2,000 mg in sodium chloride 0.9 % 500 mL intermittent infusion         2,000 mg  over 180 Minutes Intravenous ONCE 23 1604            Contrast Orders - past 72 hours (72h ago, onward)    None          Insight[employer] Prediction of Planned Initial Vancomycin Regimen  Loading dose: 2000 mg at 16:05 2023.  Regimen: 1250 mg IV every 12 hours.  Start time: 16:05 on 2023  Exposure target: AUC24 (range)400-600 mg/L.hr   AUC24,ss: 535 mg/L.hr  Probability of AUC24 > 400: 78 %  Ctrough,ss: 16.5 mg/L  Probability of Ctrough,ss > 20: 35 %  Probability of nephrotoxicity (Lodise CARMELA ): 12 %        Plan:  1. Start vancomycin  2000 mg IV once then 1250 mg Q12H.   2. Vancomycin monitoring method: AUC  3. Vancomycin therapeutic monitoring goal: 400-600 mg*h/L  4. Pharmacy will check vancomycin levels as appropriate in 1-3 Days.    5. Serum creatinine levels will be ordered daily for the first week of therapy and at least twice weekly for subsequent weeks.      Romeo Jones [facility]

## 2023-07-04 NOTE — LETTER
Transition Communication Hand-off for Care Transitions to Next Level of Care Provider    Name: Parker Acevedo  : 1970  MRN #: [id number]  Primary Care Provider: Chuy Isaac     Primary Clinic: [address] [place] [id number]     Reason for Hospitalization:  Positive blood culture [R78.81]  Cellulitis of left upper extremity [L03.114]  Acute deep vein thrombosis (DVT) of axillary vein of left upper extremity (H) [I82.A12]  Admit Date/Time: 2023  3:07 PM  Discharge Date: 7/10/23  Payor Source: Payor: [employer] / Plan: [employer] MEDICARE ADVANTAGE / Product Type: Medicare /          Reason for Communication Hand-off Referral: care coordination  Discharge Plan: Home IV Abx, TPN, Home care (skilled Nursing), Infusion Center (labs/line care/dressing changes)    Concern for non-adherence with plan of care: Yes-in past, patient has refused dressing changes and line care with home health agencies. He has been refused from home health companies. Would prefer home care agency instead of travel to infusion company and may need assistance with care coordination from clinic to arrange.     Discharge Needs Assessment:  Needs      Flowsheet Row Most Recent Value   Anticipated Changes Related to Illness none   Equipment Currently Used at Home shower chair, grab bar, tub/shower, grab bar, toilet, other (see comments)  [Pt identifies having stair lift.]   Current Discharge Risk chronically ill   # of Referrals Placed by CM External Care Coordination          Follow-up specialty is recommended: Yes    Follow-up plan:    Future Appointments   Date Time Provider Department Center   2023  2:00 PM Arabella Vines MD [facility]   2023 10:00 AM Sandra Serrano MD [facility]   2023 11:00 AM Natalie Hull APRN CNP [facility]       Any outstanding tests or procedures:        Referrals       Future Labs/Procedures    Primary Care - Care Coordination Referral     Process  Instructions:    Services are provided by a Care Coordinator for people with complex needs such as: medical, social, or financial troubles.  The Care Coordinator works with the patient and their Primary Care Provider to determine health goals, obtain resources, achieve outcomes, and develop care plans that help coordinate the patient's care.     Comments:         Home Infusion Referral     Comments:    TPN: ClinimixE 4.25% AA, 5% dextrose at 83 ml/hr with 250 ml 20% IV lipids 5-days per week (M-F)    Home Infusion Referral     Comments:    Vancomycin: Regimen: 1500 mg IV every 12 hours.    OPAT enrollment and ID Clinic Referral     Comments:    You are being prescribed a strong antibiotic treatment for an infection. This treatment requires close monitoring, and you have been recommended to follow up with a specialist in the Infectious Diseases Clinic within 2 weeks of your hospital discharge.   Our team of Infectious Diseases specialists looks forward to seeing you in clinic. A representative will call you within 3 business days to help schedule your appointment. If you do not receive a call to schedule, or if you have any questions about or problems with your clinical care, please contact us by phone at [phone].              FOLLOW UP NEEDED:     Patient will need weekly labs at infusion center due to inability to secure home health (5/6 companies refused to take patient back due to noncompliance) and patient can administer home IVAbx and TPN but needs labs and dressing changes in the clinic setting.     Weekly labs required: CBC with diff, BMP, CRP and Vancomycin level. Dr. Alanis will follow labs at discharge until ID follow up. Fax labs to ID clinic.   [facility] Information:  Phone: [phone]  Fax: [phone] (Attention Carlos Sorensen)    Alcira Leal RN    AVS/Discharge Summary is the source of truth; this is a helpful guide for improved communication of  patient story

## 2023-07-04 NOTE — ED PROVIDER NOTES
7:46 AM  At shift change I attempted to contact the patient by phone.  He did not  on numerous attempts.  I was able to reach his wife Rose.  She was at work.  She was listed as his emergency contact.  The patient was recently seen at [facility].  Blood cultures 2/2 were both positive for MRSE (methicillin-resistant staph epididymitis).  Wife reports that his right arm is quite swollen.  Recently had Cm catheter removed because of infection.  PICC line is in his left arm.  Apparently recently confirmed DVT positive is now on anticoagulation.  She is uncertain if he has been compliant in starting his oral clindamycin.  Did receive a dose of IV Rocephin in the ED at [facility].    Wife plans to contact patient encouraged him to come to the emergency department for further evaluation and treatment.  Patient has presented here numerous times in the past becomes very frustrated and is difficult to deal with because of typical delays getting him down to the [facility] due to bed availability and transfer difficulties.  With this significant frustration he has been reluctant to come into the [facility].  I did recommend that they just consider driving down to the [facility] where he has access to his infectious disease specialist.  They could evaluate if he needs the PICC line pulled and new site/replacement.  I provided my name and direct phone number to [facility] if either he or his wife have any questions.  Informed them that they are welcome to come to the ED at [facility].  She indicated that she would prefer to try to convince her to go to [place] where they can get more definitive care.     Francis Mir, DO  07/04/23 0758

## 2023-07-04 NOTE — ED TRIAGE NOTES
Pt said he was seen at [facility] recently and they did blood cultures and a DVT workup. Pt said he was called and told his blood cultures were positive and to get to the ER right away. Pt has a PICC line in  the L arm which is where he says his DVT is also.

## 2023-07-04 NOTE — ED NOTES
[facility] [address] [facility] [address] [place] [address]  Dept: [phone]  Loc: [phone]   Hematology/Oncology Consult (Clinic)           3/9/22      Chelly Levine   1993      Refugia Plana, MD Gerhardt Lard, APRN - CNP         Reason: Seminoma of Descended Right Testicle  No chief complaint on file.           HPI:               Mr. Karen Carlson is a 34year old male with a past history of a 2 pack year smoking history, quit in 2021. He reported Right testicular swelling and occasional pain with movement starting in January on 2022. Stated the pain was usually an ache, buy with acitivty it would become sharp to about an 8/10. He had seen Urology who preformed an Ultrasound of the Testicle and found a 6.5 x 5.7 x 3.7 lobulated/hypervascular mass in his right testicle. Urology preformed a total Right Radical Orchiectomy on 2/23/2022. The testis was sent to pathology and returned with a Seminoma pT2 (Nodes not sampled) with the tumor invading the hilar soft tissues and noted negative margins. 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     ROS:  Review of Systems   Constitutional: Negative for activity change, appetite change, chills, fatigue, fever and unexpected weight change. HENT: Negative for congestion, nosebleeds and sore throat. Respiratory: Negative for cough, chest tightness, shortness of breath and wheezing. Cardiovascular: Negative for chest pain, palpitations and leg swelling. Gastrointestinal: Negative for abdominal pain, constipation, diarrhea, nausea and vomiting. Genitourinary: Positive for scrotal swelling (states improvemnt since surgery). Negative for difficulty urinating, dysuria, flank pain, hematuria and penile pain. Musculoskeletal: Negative for myalgias. Skin: Negative for color change and rash.         Healing Incisional wound of left lower abdomen     Neurological: Lab called for blood culture results , Dr. Chahal notified of test results    Negative for dizziness, syncope and numbness. Hematological: Negative for adenopathy. Does not bruise/bleed easily. PMH:   Past Medical History:   Diagnosis Date    Nicotine dependence     Testicular cancer ([facility] [address]         Social HX:   Social History     Socioeconomic History    Marital status: Single     Spouse name: Not on file    Number of children: Not on file    Years of education: Not on file    Highest education level: Not on file   Occupational History    Not on file   Tobacco Use    Smoking status: Former Smoker     Packs/day: 0.50     Years: 2.00     Pack years: 1.00     Types: Cigarettes     Quit date:      Years since quittin.1    Smokeless tobacco: Never Used   Vaping Use    Vaping Use: Never used   Substance and Sexual Activity    Alcohol use: Yes     Alcohol/week: 0.0 standard drinks     Comment: occasional    Drug use: No    Sexual activity: Yes     Partners: Female   Other Topics Concern    Not on file   Social History Narrative    Not on file     Social Determinants of Health     Financial Resource Strain:     Difficulty of Paying Living Expenses: Not on file   Food Insecurity:     Worried About Running Out of Food in the Last Year: Not on file    Marlen of Food in the Last Year: Not on file   Transportation Needs:     Lack of Transportation (Medical): Not on file    Lack of Transportation (Non-Medical):  Not on file   Physical Activity:     Days of Exercise per Week: Not on file    Minutes of Exercise per Session: Not on file   Stress:     Feeling of Stress : Not on file   Social Connections:     Frequency of Communication with Friends and Family: Not on file    Frequency of Social Gatherings with Friends and Family: Not on file    Attends Jainism Services: Not on file    Active Member of Clubs or Organizations: Not on file    Attends Club or Organization Meetings: Not on file    Marital Status: Not on file   Intimate Partner Violence:     Fear of RADICAL INGUINAL ORCHIECTOMY performed by Mohit Mohan MD at [facility]        Medications:  Current Outpatient Medications   Medication Sig Dispense Refill    oxyCODONE-acetaminophen (PERCOCET) 5-325 MG per tablet Take 1 tablet by mouth every 4 hours as needed for Pain. (Patient not taking: Reported on 3/9/2022)       No current facility-administered medications for this visit. EXAM:   height is 6' 2\" (1.88 m) and weight is 209 lb (94.8 kg). His oral temperature is 98.5 °F (36.9 °C). His blood pressure is 144/87 (abnormal) and his pulse is 86. His respiration is 16 and oxygen saturation is 94%. Estimated body surface area is 2.22 meters squared as calculated from the following:    Height as of this encounter: 6' 2\" (1.88 m). Weight as of this encounter: 209 lb (94.8 kg). ECO  General: Non-ill appearing. HEENT: NC/AT,nonicteric, perrla,eom intact, no mucosal lesions  Neck: normal thyroid, no masses. pulses nl, no bruits,   Nodes: No adenopathy  Lungs/chest: clear, no rales,rhonchi or wheezing, lung bases clear  CV: rrr, no rubs ,gallops or murmurs  Breasts: normal exam  Abd/Rectal: soft, non-tender,bowel sounds normal , no HSM,no masses  Back: normal curvature, No midline tenderness. flanks nontender  : Is post right orchiectomy with high in all incision site healing nicely with Steri-Strips. Remaining left testicle is normal size without induration or suspicious findings. Extremities: no cyanosis,clubbing or edema. Skin: unremarkable  Neuro: A and O x 4, CN exam nonfocal, Motor- no deficits, Sensory- no deficits, gait-nl, speech- fluent, no ataxia.   Devices: none    DATA:    LAB:     CBC with Differential:      Lab Results   Component Value Date    WBC 9.6 2022    RBC 5.33 2022    HGB 16.7 2022    HCT 49.3 2022     2022    MCV 92.5 2022    MCH 31.3 2022    MCHC 33.9 2022    RDW 12.3 2013    NRBC 0 2022    SEGSPCT 73.6 02/21/2022    MONOPCT 8.0 02/21/2022    MONOSABS 0.8 02/21/2022    LYMPHSABS 1.7 02/21/2022    EOSABS 0.0 02/21/2022    BASOSABS 0.0 02/21/2022     Lab Results   Component Value Date/Time    SEGSABS 7.1 02/21/2022 03:00 PM       CMP:    Lab Results   Component Value Date     02/21/2022    K 4.5 02/21/2022     02/21/2022    CO2 23 02/21/2022    BUN 10 02/21/2022    CREATININE 0.8 02/21/2022    LABGLOM >90 02/21/2022    GLUCOSE 90 02/21/2022    PROT 7.6 02/21/2022    LABALBU 4.7 02/21/2022    CALCIUM 9.6 02/21/2022    BILITOT 0.5 02/21/2022    ALKPHOS 119 02/21/2022    AST 19 02/21/2022    ALT 20 02/21/2022       BMP:    Lab Results   Component Value Date     02/21/2022    K 4.5 02/21/2022     02/21/2022    CO2 23 02/21/2022    BUN 10 02/21/2022    LABALBU 4.7 02/21/2022    CREATININE 0.8 02/21/2022    CALCIUM 9.6 02/21/2022    LABGLOM >90 02/21/2022    GLUCOSE 90 02/21/2022     Pretreatment beta-hCG8  Pretreatment alpha-fetoproteinnormal          IMAGING:     XR CHEST (2 VW)     Impression   No acute intrathoracic process. Clear lungs.               **This report has been created using voice recognition software.  It may contain minor errors which are inherent in voice recognition technology. **       Final report electronically signed by Dr Becky Bright on 2/22/2022 11:11 AM             US SCROTUM AND TESTICLES     Impression       1. 6.5 x 5.7 x 3.7 cm lobulated hypervascular mass in the right testicle is concerning for neoplasm. 2. Associated moderate hydrocele on the right. 3. 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                   **This report has been created using voice recognition software. It may contain minor errors which are inherent in voice recognition technology. **       Final report electronically signed by Dr. Fran Garnica MD on 2/21/2022 2:00 PM          CT ABDOMEN PELVIS W IV CONTRAST Additional Contrast? None     Impression    No evidence of acute intra-abdominal or intrapelvic abnormality. No evidence of metastatic disease.                   **This report has been created using voice recognition software. It may contain minor errors which are inherent in voice recognition technology. **       Final report electronically signed by Dr. Brent Bernabe MD on 2/21/2022 4:33 PM             PROCEDURES:  Right Radical Orchiectomy on 2/23/2022 preformed by Dr. Jayson Pastrana from Urology.     PATHOLOGY:   FINAL DIAGNOSIS:   Right testicle, radical orchiectomy:    Seminoma (pT2, pN not assigned).    Indeterminate for lymphovascular space invasion.    Tumor invades hilar soft tissue.    Germ cell neoplasia in situ (GCNIS).  Margins are negative.    Please see synoptic report. Specimen:   EXCISION OF TESTICLE, RIGHT AND SPERMATIC CORD   Examination Preformed by Dr. Grabiel Hawley MD [facility]     GENETICS:  None.     MOLECULAR:  Beta HcG Quantitative: 8 on 2/22/2022  LDH: 445 on 2/21/2022  AFP-Tumor Marker: 3.0 on 2/21/2022     ASSESSMENT/PLAN:     1: Diagnosis:  56-year-old male with Seminonma of Right Descended Testis, Stage IB (pT2, N0, M0, S0). Presumed to be N0 but will wait for post orchiectomy tumor marker to return. Beta HcG Quantitative elevated mildly at 8, LDH elevated at 445. Labs were preformed prior to Orchiectomy on 2/23/2022. Redrawing Labs and getting a CT of the Chest for definitive staging purposes. CT of the abdomen pelvis reviewed and shows no suspicious adenopathy.    2) Prognosis / Disease Status:   Good prognosis, patient does need strict follow-up, however.      3) Work-up:               Labs: Repeat Beta HcG Quantitative, LDH, and AFP tumor marker              Imaging: Obtain CT of Abdomen and Pelvis with IV Contrast (scheduled for 5/2022), Obtain CT of Chest with Contrast              Procedures: None at current moment              Consults: None, continue to follow Urology post surgery                               After discussion of pros and cons of various treatment options including surveillance radiation versus carboplatinum patient was not interested in referral for radiation oncology. Other: None     4) Symptom Management:  None needed as he is asymptomatic.      5) Supportive care provided. Level of care is appropriate. Teaching done today. Reviewed his diagnosis and staging. Stage Ib pure seminoma. Presuming normalization of his beta-hCG and LDH he will be asked 0. NCCN guidelines reviewed in detail with the patient including 3 equivalent treatment options in terms of overall survival.  These include surveillance which is preferred versus radiation versus 1 or 2 courses of carboplatinum.           6) Treatment goal:      Treatment plan:      Obtaining repeat labs and Imaging to determine stability after Orchectomy. Discussed with the patient the need for close monitoring. After discussing the risk and benefit of Chemotherapy, radiation, and surveillance, the patient opted for surveillance at this time. Patient appears to be responsible I believe he will be compliant. Seeing in two weeks for lab work follow-up. Patient to be seen for H&P every 4 months for 1 year. Patient will need CT of the Abdomen and Pelvis every 6 months for 1 year. Follow NCCN guidelines for post 1 year.        7) Medications reviewed. Prescriptions today: None              Encounter Medications    No orders of the defined types were placed in this encounter.         [place]:  Controlled Substance Monitoring:     Acute and Chronic Pain Monitoring:   No flowsheet data found.    8) Research Options:       None.        9) Other:        None.     10) Follow Up:   Follow-up with me in 2 weeks to review tumor markers postorchiectomy and chest CT and to establish strict guidelines for surveillance.       MD Jeana Rojas,

## 2023-07-05 ENCOUNTER — PATIENT OUTREACH (OUTPATIENT)
Dept: CARE COORDINATION | Facility: CLINIC | Age: 53
End: 2023-07-05
Payer: COMMERCIAL

## 2023-07-05 LAB
ANION GAP SERPL CALCULATED.3IONS-SCNC: 11 MMOL/L (ref 7–15)
BUN SERPL-MCNC: 16.9 MG/DL (ref 6–20)
CALCIUM SERPL-MCNC: 8.8 MG/DL (ref 8.6–10)
CHLORIDE SERPL-SCNC: 108 MMOL/L (ref 98–107)
CREAT SERPL-MCNC: 0.71 MG/DL (ref 0.67–1.17)
DEPRECATED HCO3 PLAS-SCNC: 24 MMOL/L (ref 22–29)
ERYTHROCYTE [DISTWIDTH] IN BLOOD BY AUTOMATED COUNT: 16.6 % (ref 10–15)
GFR SERPL CREATININE-BSD FRML MDRD: >90 ML/MIN/1.73M2
GLUCOSE BLDC GLUCOMTR-MCNC: 99 MG/DL (ref 70–99)
GLUCOSE SERPL-MCNC: 96 MG/DL (ref 70–99)
HCT VFR BLD AUTO: 35 % (ref 40–53)
HGB BLD-MCNC: 10.7 G/DL (ref 13.3–17.7)
MAGNESIUM SERPL-MCNC: 2 MG/DL (ref 1.7–2.3)
MCH RBC QN AUTO: 30.1 PG (ref 26.5–33)
MCHC RBC AUTO-ENTMCNC: 30.6 G/DL (ref 31.5–36.5)
MCV RBC AUTO: 98 FL (ref 78–100)
PHOSPHATE SERPL-MCNC: 3.1 MG/DL (ref 2.5–4.5)
PLATELET # BLD AUTO: 194 10E3/UL (ref 150–450)
POTASSIUM SERPL-SCNC: 3.9 MMOL/L (ref 3.4–5.3)
RBC # BLD AUTO: 3.56 10E6/UL (ref 4.4–5.9)
SODIUM SERPL-SCNC: 143 MMOL/L (ref 136–145)
WBC # BLD AUTO: 10 10E3/UL (ref 4–11)

## 2023-07-05 PROCEDURE — 250N000013 HC RX MED GY IP 250 OP 250 PS 637

## 2023-07-05 PROCEDURE — 250N000009 HC RX 250

## 2023-07-05 PROCEDURE — 87040 BLOOD CULTURE FOR BACTERIA: CPT | Performed by: PEDIATRICS

## 2023-07-05 PROCEDURE — 83735 ASSAY OF MAGNESIUM: CPT | Performed by: STUDENT IN AN ORGANIZED HEALTH CARE EDUCATION/TRAINING PROGRAM

## 2023-07-05 PROCEDURE — 99232 SBSQ HOSP IP/OBS MODERATE 35: CPT | Mod: GC | Performed by: STUDENT IN AN ORGANIZED HEALTH CARE EDUCATION/TRAINING PROGRAM

## 2023-07-05 PROCEDURE — 80048 BASIC METABOLIC PNL TOTAL CA: CPT

## 2023-07-05 PROCEDURE — 999N000248 HC STATISTIC IV INSERT WITH US BY RN

## 2023-07-05 PROCEDURE — 120N000002 HC R&B MED SURG/OB UMMC

## 2023-07-05 PROCEDURE — 258N000003 HC RX IP 258 OP 636: Performed by: EMERGENCY MEDICINE

## 2023-07-05 PROCEDURE — 84100 ASSAY OF PHOSPHORUS: CPT | Performed by: STUDENT IN AN ORGANIZED HEALTH CARE EDUCATION/TRAINING PROGRAM

## 2023-07-05 PROCEDURE — 99222 1ST HOSP IP/OBS MODERATE 55: CPT | Mod: FS | Performed by: PHYSICIAN ASSISTANT

## 2023-07-05 PROCEDURE — 250N000011 HC RX IP 250 OP 636: Performed by: EMERGENCY MEDICINE

## 2023-07-05 PROCEDURE — 36415 COLL VENOUS BLD VENIPUNCTURE: CPT | Performed by: PEDIATRICS

## 2023-07-05 PROCEDURE — 250N000011 HC RX IP 250 OP 636

## 2023-07-05 PROCEDURE — 36415 COLL VENOUS BLD VENIPUNCTURE: CPT

## 2023-07-05 PROCEDURE — 85027 COMPLETE CBC AUTOMATED: CPT

## 2023-07-05 PROCEDURE — 250N000013 HC RX MED GY IP 250 OP 250 PS 637: Performed by: STUDENT IN AN ORGANIZED HEALTH CARE EDUCATION/TRAINING PROGRAM

## 2023-07-05 RX ORDER — DIPHENHYDRAMINE HCL 12.5MG/5ML
50 LIQUID (ML) ORAL EVERY 12 HOURS
Status: DISCONTINUED | OUTPATIENT
Start: 2023-07-05 | End: 2023-07-11 | Stop reason: HOSPADM

## 2023-07-05 RX ORDER — DEXTROSE MONOHYDRATE 100 MG/ML
INJECTION, SOLUTION INTRAVENOUS CONTINUOUS PRN
Status: DISCONTINUED | OUTPATIENT
Start: 2023-07-05 | End: 2023-07-11 | Stop reason: HOSPADM

## 2023-07-05 RX ORDER — DIPHENHYDRAMINE HCL 50 MG
50 CAPSULE ORAL EVERY 12 HOURS
Status: DISCONTINUED | OUTPATIENT
Start: 2023-07-05 | End: 2023-07-05

## 2023-07-05 RX ORDER — DEXTROSE MONOHYDRATE 100 MG/ML
INJECTION, SOLUTION INTRAVENOUS CONTINUOUS PRN
Status: DISCONTINUED | OUTPATIENT
Start: 2023-07-05 | End: 2023-07-05

## 2023-07-05 RX ADMIN — SUCRALFATE 1 G: 1 SUSPENSION ORAL at 13:28

## 2023-07-05 RX ADMIN — SUCRALFATE 1 G: 1 SUSPENSION ORAL at 08:13

## 2023-07-05 RX ADMIN — CYANOCOBALAMIN 1000 MCG: 1000 INJECTION, SOLUTION INTRAMUSCULAR; SUBCUTANEOUS at 08:13

## 2023-07-05 RX ADMIN — ONDANSETRON 4 MG: 4 TABLET, ORALLY DISINTEGRATING ORAL at 15:06

## 2023-07-05 RX ADMIN — VANCOMYCIN HYDROCHLORIDE 1250 MG: 10 INJECTION, POWDER, LYOPHILIZED, FOR SOLUTION INTRAVENOUS at 18:21

## 2023-07-05 RX ADMIN — NYSTATIN: 100000 CREAM TOPICAL at 22:21

## 2023-07-05 RX ADMIN — CARVEDILOL 6.25 MG: 6.25 TABLET, FILM COATED ORAL at 08:13

## 2023-07-05 RX ADMIN — VANCOMYCIN HYDROCHLORIDE 1250 MG: 10 INJECTION, POWDER, LYOPHILIZED, FOR SOLUTION INTRAVENOUS at 06:02

## 2023-07-05 RX ADMIN — LORAZEPAM 1 MG: 0.5 TABLET ORAL at 22:18

## 2023-07-05 RX ADMIN — ENOXAPARIN SODIUM 80 MG: 80 INJECTION SUBCUTANEOUS at 22:22

## 2023-07-05 RX ADMIN — CARVEDILOL 6.25 MG: 6.25 TABLET, FILM COATED ORAL at 18:21

## 2023-07-05 RX ADMIN — FENTANYL 1 PATCH: 25 PATCH TRANSDERMAL at 22:18

## 2023-07-05 RX ADMIN — SUCRALFATE 1 G: 1 SUSPENSION ORAL at 17:08

## 2023-07-05 RX ADMIN — OXYCODONE HYDROCHLORIDE 10 MG: 5 SOLUTION ORAL at 15:02

## 2023-07-05 RX ADMIN — DIPHENHYDRAMINE HYDROCHLORIDE 50 MG: 50 INJECTION, SOLUTION INTRAMUSCULAR; INTRAVENOUS at 05:22

## 2023-07-05 RX ADMIN — PANTOPRAZOLE SODIUM 40 MG: 40 TABLET, DELAYED RELEASE ORAL at 08:13

## 2023-07-05 RX ADMIN — DIPHENHYDRAMINE HYDROCHLORIDE 50 MG: 25 SOLUTION ORAL at 17:25

## 2023-07-05 RX ADMIN — ASCORBIC ACID, VITAMIN A PALMITATE, CHOLECALCIFEROL, THIAMINE HYDROCHLORIDE, RIBOFLAVIN-5 PHOSPHATE SODIUM, PYRIDOXINE HYDROCHLORIDE, NIACINAMIDE, DEXPANTHENOL, ALPHA-TOCOPHEROL ACETATE, VITAMIN K1, FOLIC ACID, BIOTIN, CYANOCOBALAMIN: 200; 3300; 200; 6; 3.6; 6; 40; 15; 10; 150; 600; 60; 5 INJECTION, SOLUTION INTRAVENOUS at 22:49

## 2023-07-05 RX ADMIN — DEXTROAMPHETAMINE SACCHARATE, AMPHETAMINE ASPARTATE, DEXTROAMPHETAMINE SULFATE, AND AMPHETAMINE SULFATE 20 MG: 2.5; 2.5; 2.5; 2.5 TABLET ORAL at 08:13

## 2023-07-05 RX ADMIN — ENOXAPARIN SODIUM 80 MG: 80 INJECTION SUBCUTANEOUS at 08:13

## 2023-07-05 RX ADMIN — SUCRALFATE 1 G: 1 SUSPENSION ORAL at 22:18

## 2023-07-05 ASSESSMENT — ACTIVITIES OF DAILY LIVING (ADL)
ADLS_ACUITY_SCORE: 24
ADLS_ACUITY_SCORE: 37
ADLS_ACUITY_SCORE: 24
ADLS_ACUITY_SCORE: 37
ADLS_ACUITY_SCORE: 24
ADLS_ACUITY_SCORE: 24
ADLS_ACUITY_SCORE: 37
ADLS_ACUITY_SCORE: 24
ADLS_ACUITY_SCORE: 37
ADLS_ACUITY_SCORE: 37

## 2023-07-05 NOTE — CONSULTS
[facility] CONSULT[facility]     Patient:  Parker Acevedo   Date of birth 1970, Medical record number [id number]  Date of Visit:  07/05/2023  Date of Admission: 7/4/2023  Consult Requester:Shanae Gonzalez MD          Assessment and Recommendations:   ASSESSMENT:  1. High-grade MRSE bacteremia, CLABSI  2. Recent polymicrobial CLABSI (6/4/23)- K.pneumoniae, K.oxytoca, E.faecalis (Vancomycin + Ceftriaxone x7 days)  3. Short gut syndrome on chronic TPN  4. DVT  5. Hx fungemia and multiple episodes of bacteremia   6. Abx allergies: penicillins, TMP-SMX, doxycycline, vancomycin (tolerates with slow infusion and premedication)    DISCUSSION:   Parker Acevedo is a 52 year old male with history of Celestino-en-Y gastric bypass esophagojejunostomy c/b short gut syndrome and chronic malnutrition on TPN, DVT, numerous central line associated infections including an episode of candidemia (1/2019), and   bacteremias most recently 6/4/23 with polymicrobial growth on cultures, who presented to ED with arm swelling on 7/3/23 and was found to have left subclavian and right internal jugular DVTs and MRSE bacteremia.    Blood cultures +MRSE in 4/4 bottles collected 7/3, +7/4, pending 7/5. Recent bacteremia with Cm removal and PICC line placement (6/8) bleeding from PICC site after placement. 2-3 weeks of intermittent fevers and low-grade temperatures. Now with LUE swelling and DVT. No respiratory symptoms, dental pain, focal joint pain, other wounds. Likely CLABSI. Given high-grade bacteremia with >2 weeks of intermittent symptoms, recommend TTE and likely needs RONEL as well. Suspect DVT may also be infected.     RECOMMENDATION:  1. Continue vancomycin, duration TBD but anticipate longer course for complicated CoNS bacteremia with possibly infected DVT  2. Daily blood cultures x2 sets until NG at 48-72 hrs  3. TTE  4. Anticipate need for RONEL  5. No central line placement until blood cultures negative  "[id number]    Thank you for this consult. ID will continue to follow.     Patient was discussed with Dr. Alanis.     Elaine Roberts PA-C  Infectious Disease  Pager # [phone]     ________________________________________________________________    Consult Question: MRSE bacteremia associated with central line.  Admission Diagnosis: Positive blood culture [R78.81]  Cellulitis of left upper extremity [L03.114]  Acute deep vein thrombosis (DVT) of axillary vein of left upper extremity (H) [I82.A12]         History of Present Illness:     Parker Acevedo is a 52 year old male with history of Celestino-en-Y gastric bypass esophagojejunostomy c/b short gut syndrome and chronic malnutrition on TPN, DVT, numerous central line associated infections including an episode of candidemia (1/2019), and   bacteremias most recently 6/4/23 with polymicrobial growth on cultures, who presented to ED with arm swelling on 7/3/23 and was found to have left subclavian and right internal jugular DVTs and MRSE bacteremia.    History is provided by Parker and his wife Rose. He was recently treated for Cm line associated polymicrobial bacteremia (6/4/23) with a week of vancomycin + ceftriaxone. At that time he had fevers, rigors, and confusion. Cultures grew Klebsiella oxytoca, Klebsiella pneumoniae, and Enterococcus faecalis. The Cm was removed and a new PICC line was placed on 6/8/23. He had significant bleeding from line site when he went home, which self resolved.     Over the last 2-3 weeks he has felt like \"something is brewing\" and his wife describes intermittent low-grade fevers with occasional temperatures up to 100.4 but self resolving. Associated with intermittent body aches. No specific joint pain or swelling, cough, shortness of breath. Home health nurse collected blood cultures per patient report but there was quite a bit of blood on the bottle and small volume in bottle by his description. He went to [facility] ED on 7/3 due " "to swelling of his left forearm and hand. Blood cultures were drawn and he returned home on clindamycin. Swelling was associated with redness, tenderness to touch, and pruritis. He had some white rash-like bumps on the back of his hand and the ulnar aspect of palm, which have resolved. Today his hand and arm remain swollen and mildly red, the itching and pain are improving, bumps have resolved. He did not have any bumps anywhere else.    Blood stream infection history:  7/3/23-*: MRSE (R: clindamycin, erythromycin, oxacillin)  6/4/23: Klebsiella pneumoniae (pan-S with exception of intrinsic ampicillin resistance), Klebsiella oxytoca (pan-S with exception of intrinsic ampicillin resistance), E.faecalis (pan-S).   7/5-7/7/2022: Enterobacter cloacae complex, Bacillus cereus group, Lactococcus lactis  5/2022: Staph haemolyticus (S: vancomycin, tetracycline; R: cipro, clindamycin, gentamicin, oxacillin), S.epidermidis (R: erythromycin)  1/6/2022: S.epidermidis (R: clindamycin, erythromycin; I: gentamicin), S.hominis (R: clindamycin, erythromycin)  8/14/21: Pseudomonas oryzihabitans (pan-S)  8/10/21: S.epidermidis 1/2 stets  3/19/21: S.epidermidis 1/2 sets on day #2  1/29/21 (central line): S.hominis, S.capitis, S.epidermidis, E.faecalis, Aerococcus viridans, C.jeikeium; Strep salivarius, Strep mitis.  1/28/21: E.faecalis (pan-S), Staph hominis (R: clindamycin, erythromycin, oxacillin), GPB resembling diphtheroids (R: Clindamycin), Corynebacterium (R: ceftriaxone, clindamycin, pcn)  8/31/20: S.lugdunensis (R: clindamycin, erythromycin)  8/29/20: Gram positive rods, Psychrobacter faecalis  7/28/20: Corynebacterium  7/26/20: S.epidermidis  ~~~  1/2-1/4/2019: C.albicans             Review of Systems:   CONSTITUTIONAL:  +feeling \"like something is brewing\", intermittent fevers. Negative for rigors, chills, sweats.  EYES: negative for icterus  ENT:  +ear bleeding in late May or early June. Negative for ear pain, sore " throat.  RESPIRATORY:  Infrequent cough (at baseline), no sputum or dyspnea  CARDIOVASCULAR: +edema at baseline, uses compression stockings. negative for chest pain, dyspnea  GASTROINTESTINAL:  negative for nausea, vomiting, diarrhea, abd pain  MUSCULOSKELETAL:  +swelling over left forearm. +diffuse aches, no specific joint pain, swelling, or redness.  INTEGUMENT: +swelling, redness, pruritis of left forearm and dorsum of hand. +bruising and nodules at lovenox injection sites. No rashes.   NEURO:  Negative for headache         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     Complication of anesthesia      Difficulty swallowing      Gastric ulcer, unspecified as acute or chronic, without mention of hemorrhage, perforation, or obstruction      Gastro-oesophageal reflux disease      Head injury      Hiatal hernia      Other bladder disorder      Other chronic pain      PONV (postoperative nausea and vomiting)      Severe malnutrition (H)     TPN     Short gut syndrome      Tobacco abuse             Past Surgical History:     Past Surgical History:   Procedure Laterality Date     AMPUTATION       APPENDECTOMY       BACK SURGERY  11/3/2014    curve in the spine     BIOPSY LYMPH NODE CERVICAL N/A 2/20/2015    Procedure: BIOPSY LYMPH NODE CERVICAL;  Surgeon: Baron Scanlon MD;  Location:  OR     CHOLECYSTECTOMY       COLONOSCOPY N/A 7/14/2021    Procedure: COLONOSCOPY;  Surgeon: Jimbo Estrada MD;  Location: [facility] OR     COLONOSCOPY N/A 4/13/2022    Procedure: COLONOSCOPY;  Surgeon: Jimbo Estrada MD;  Location: [facility] OR     DISCECTOMY, FUSION CERVICAL ANTERIOR ONE LEVEL, COMBINED N/A 2/15/2017    Procedure:  COMBINED DISCECTOMY, FUSION CERVICAL ANTERIOR ONE LEVEL;  Surgeon: Darren Campos MD;  Location: [facility]     ENDOSCOPIC INSERTION TUBE GASTROSTOMY  9/9/2013    Procedure: ENDOSCOPIC INSERTION TUBE GASTROSTOMY;;  Surgeon: Francis Vyas MD;  Location: [facility] OR     ENDOSCOPIC ULTRASOUND UPPER GASTROINTESTINAL TRACT (GI)  4/29/2011    Procedure:ENDOSCOPIC ULTRASOUND UPPER GASTROINTESTINAL TRACT (GI); Both Procedures done Conjointly; Surgeon:NEREIDA HOUSER; Location:[facility] OR     ENDOSCOPIC ULTRASOUND UPPER GASTROINTESTINAL TRACT (GI)  9/9/2013    Procedure: ENDOSCOPIC ULTRASOUND UPPER GASTROINTESTINAL TRACT (GI);  Endoscopic Ultrasound Guide Gastrostomy Tube Placement  C-arm;  Surgeon: Noe Lizarraga MD;  Location: [facility] OR     ENDOSCOPIC ULTRASOUND UPPER GASTROINTESTINAL TRACT (GI) N/A 2/24/2021    Procedure: ENDOSCOPIC ULTRASOUND, ESOPHAGOSCOPY / UPPER GASTROINTESTINAL TRACT (GI), esophagastrogastroduodenoscopy;  Surgeon: Berny Bach MD;  Location: [facility] OR     ENDOSCOPY  03/25/11    EGD, [facility]     ENDOSCOPY  08/04/09    Upper Endoscopy, [facility]     ENDOSCOPY  01/05/09    Upper Endoscopy, [facility]     ESOPHAGOSCOPY, GASTROSCOPY, DUODENOSCOPY (EGD), COMBINED  4/20/2011    Procedure:COMBINED ESOPHAGOSCOPY, GASTROSCOPY, DUODENOSCOPY (EGD); Surgeon:FRANCIS VYAS; Location: GI     ESOPHAGOSCOPY, GASTROSCOPY, DUODENOSCOPY (EGD), COMBINED  6/15/2011    Procedure:COMBINED ESOPHAGOSCOPY, GASTROSCOPY, DUODENOSCOPY (EGD); Surgeon:FRANCIS VYAS; [facility]     ESOPHAGOSCOPY, GASTROSCOPY, DUODENOSCOPY (EGD), COMBINED  6/12/2013    Procedure: COMBINED ESOPHAGOSCOPY, GASTROSCOPY, DUODENOSCOPY (EGD);;  Surgeon: Francis Vyas MD;  [facility]: [facility]     ESOPHAGOSCOPY, GASTROSCOPY, DUODENOSCOPY (EGD), COMBINED  11/22/2013    Procedure: COMBINED ESOPHAGOSCOPY, GASTROSCOPY, DUODENOSCOPY (EGD);;  Surgeon: Francis Vyas MD;  [facility]      ESOPHAGOSCOPY, GASTROSCOPY, DUODENOSCOPY (EGD), COMBINED  4/30/2014    Procedure: COMBINED ESOPHAGOSCOPY, GASTROSCOPY, DUODENOSCOPY (EGD);  Surgeon: Francis Vyas MD;  Location:  GI     ESOPHAGOSCOPY, GASTROSCOPY, DUODENOSCOPY (EGD), COMBINED N/A 2/20/2015    Procedure: COMBINED ESOPHAGOSCOPY, GASTROSCOPY, DUODENOSCOPY (EGD), BIOPSY SINGLE OR MULTIPLE;  Surgeon: Baron Scanlon MD;  Location[facility]     ESOPHAGOSCOPY, GASTROSCOPY, DUODENOSCOPY (EGD), COMBINED N/A 9/30/2015    Procedure: COMBINED ESOPHAGOSCOPY, GASTROSCOPY, DUODENOSCOPY (EGD);  Surgeon: Francis Vays MD;  Location:  GI     ESOPHAGOSCOPY, GASTROSCOPY, DUODENOSCOPY (EGD), COMBINED N/A 10/3/2019    Procedure: Upper Endoscopy;  Surgeon: Clif Morrow MD;  [facility]: [facility]     GASTRECTOMY  6/22/2012    Procedure: GASTRECTOMY;  Open Approach, Excise Ulcers,Partial Gastrectomy, Esophagojejunostomy, Hiatal Hernia Repair, Extensive Lysis of Adhesions and Esaphagogastrodudenoscopy.;  Surgeon: Francis Vyas MD;  Location: [facility]     GASTROJEJUNOSTOMY  08/26/09    Extensice enterolysis, partial resect. jejunum, part. resect gastric pouch, gastrojejunostomy anastomosis     HC ESOPH/GAS REFLUX TEST W NASAL IMPED ELECTRODE  8/5/2013    Procedure: ESOPHAGEAL IMPEDENCE FUNCTION TEST 1 HOUR OR LESS;  Surgeon: Halie Lang MD;  Location:  GI     HEAD & NECK SURGERY  2/15/2017    C5-C6     HERNIA REPAIR  2006    Umbilical hernia     HERNIORRHAPHY HIATAL  6/22/2012    Procedure: HERNIORRHAPHY HIATAL;;  Surgeon: Francis Vyas MD;  [facility]     HERNIORRHAPHY INGUINAL  11/22/2013    Procedure: HERNIORRHAPHY INGUINAL;;  Surgeon: Francis Vyas MD;  [facility]     INSERT PICC LINE Right 12/19/2019    Procedure: Picc Placement;  Surgeon: Per Dumont PA-C;  [facility]     INSERT PICC LINE Right 2/21/2020    Procedure: INSERTION, PICC;  Surgeon: Per Dumont PA-C;  [facility]      INSERT PORT VASCULAR ACCESS Right 12/19/2017    Procedure: INSERT PORT VASCULAR ACCESS;  Right Chest Port Placement ;  Surgeon: Lisandro Alejandro PA-C;  [facility]     INSERT PORT VASCULAR ACCESS Right 8/2/2018    Procedure: INSERT PORT VASCULAR ACCESS;  Place single lumen tunneled central venous access catheter;  Surgeon: Guy Jamil PA-C;  [facility]     IR CVC TUNNEL PLACEMENT > 5 YRS OF AGE  8/7/2019     IR CVC TUNNEL PLACEMENT > 5 YRS OF AGE  4/14/2020     IR CVC TUNNEL PLACEMENT > 5 YRS OF AGE  8/3/2020     IR CVC TUNNEL PLACEMENT > 5 YRS OF AGE  9/4/2020     IR CVC TUNNEL PLACEMENT > 5 YRS OF AGE  2/5/2021     IR CVC TUNNEL PLACEMENT > 5 YRS OF AGE  3/23/2021     IR CVC TUNNEL PLACEMENT > 5 YRS OF AGE  1/13/2022     IR CVC TUNNEL PLACEMENT > 5 YRS OF AGE  5/12/2022     IR CVC TUNNEL PLACEMENT > 5 YRS OF AGE  7/13/2022     IR CVC TUNNEL REMOVAL LEFT  6/7/2023     IR CVC TUNNEL REMOVAL RIGHT  10/1/2019     IR CVC TUNNEL REMOVAL RIGHT  7/30/2020     IR CVC TUNNEL REMOVAL RIGHT  9/2/2020     IR CVC TUNNEL REMOVAL RIGHT  2/3/2021     IR CVC TUNNEL REMOVAL RIGHT  3/19/2021     IR CVC TUNNEL REMOVAL RIGHT  1/10/2022     IR CVC TUNNEL REMOVAL RIGHT  5/9/2022     IR CVC TUNNEL REMOVAL RIGHT  7/8/2022     IR CVC TUNNEL REVISION LEFT  8/10/2022     IR CVC TUNNEL REVISION RIGHT  5/7/2021     IR FOLLOW UP VISIT OUTPATIENT  8/7/2019     IR PICC EXCHANGE LEFT  6/8/2023     IR PICC PLACEMENT > 5 YRS OF AGE  3/7/2019     IR PICC PLACEMENT > 5 YRS OF AGE  12/19/2019     IR PICC PLACEMENT > 5 YRS OF AGE  2/21/2020     IR PICC PLACEMENT > 5 YRS OF AGE  6/7/2023     LAPAROTOMY EXPLORATORY  11/22/2013    Procedure: LAPAROTOMY EXPLORATORY;  Exploratory Laparotomy, Upper Endoscopy, Left Inguinal Hernia Repair;  Surgeon: Francis Vyas MD;  [facility]     ORTHOPEDIC SURGERY       PICC INSERTION Right 03/16/2017    5fr DL BioFlo PICC, 42cm (3cm external) in the R medial brachial vein w/ tip in  the SVC RA junction.     PICC INSERTION Left 09/23/2017    5fr DL BioFlo PICC, 45cm (1cm external) in the L basilic vein w/ tip in the SVC RA junction.     PICC INSERTION Right 05/16/2019    5Fr - 43cm, Medial brachial vein, low SVC     PICC INSERTION Right 10/02/2019    5Fr - 43cm (2cm external), basilic vein, low SVC     SHAYLEE EN Y BOWEL  2003     SOFT TISSUE SURGERY       THORACIC SURGERY       TONSILLECTOMY       TRANSESOPHAGEAL ECHOCARDIOGRAM INTRAOPERATIVE N/A 1/8/2019    Procedure: TRANSESOPHAGEAL ECHOCARDIOGRAM INTRAOPERATIVE;  Surgeon: GENERIC ANESTHESIA PROVIDER;  Location: [facility] OR     [facility] GASTRIC BYPASS,OBESE<100CM SHAYLEE-EN-Y  2002    lost 300 pounds            Family History:   Reviewed and non-contributory.   Family History   Problem Relation Age of Onset     Gastrointestinal Disease Mother         Crohns disease     Anxiety Disorder Mother      Thyroid Disease Mother         Grave's disease     Cancer Father         ear cancer-skin cancer/melanoma     Breast Cancer Maternal Grandmother      Macular Degeneration Maternal Grandfather      Anxiety Disorder Sister      Diabetes Maternal Uncle      Breast Cancer Other      Hypertension No family hx of      Hyperlipidemia No family hx of      Cerebrovascular Disease No family hx of      Prostate Cancer No family hx of      Depression No family hx of      Anesthesia Reaction No family hx of      Asthma No family hx of      Osteoporosis No family hx of      Genetic Disorder No family hx of      Obesity No family hx of      Mental Illness No family hx of      Substance Abuse No family hx of      Glaucoma No family hx of             Social History:     Social History     Tobacco Use     Smoking status: Light Smoker     Packs/day: 0.50     Years: 3.00     Pack years: 1.50     Types: Cigarettes     Smokeless tobacco: Never     Tobacco comments:     2/4/2021    smokes 3 cigarettes/day   Substance Use Topics     Alcohol use: No     Comment: quit 2002     History  "  Sexual Activity     Sexual activity: Yes     Partners: Female     Birth control/ protection: None     Comment: no protection            Current Medications:       amphetamine-dextroamphetamine  20 mg Oral Daily     carvedilol  6.25 mg Oral BID w/meals     cyanocobalamin  1,000 mcg Intramuscular Q30 Days     diphenhydrAMINE  50 mg Intravenous Q12H     enoxaparin ANTICOAGULANT  80 mg Subcutaneous BID     fentaNYL  25 mcg Transdermal Q72H    And     fentaNYL   Transdermal Q8H CECILE     lidocaine  1-2 patch Transdermal Q24H    And     lidocaine   Transdermal Q8H CECILE     LORazepam  1 mg Oral QPM     nicotine  1 patch Transdermal Daily     nicotine   Transdermal Q8H     nystatin   Topical BID     pantoprazole  40 mg Oral Daily     sodium chloride (PF)  3 mL Intracatheter Q8H     sucralfate  1 g Oral 4x Daily AC & HS     vancomycin  1,250 mg Intravenous Q12H            Allergies:     Allergies   Allergen Reactions     Bactrim [Sulfamethoxazole-Trimethoprim] Rash     Penicillins Anaphylaxis     Please see Antimicrobial Management Team allergy assessment note 10/10/2018. Patient reported tolerating amoxicillin.  Tolerating cefepime and ceftriaxone without reaction 6/23     Ertapenem Nausea and Vomiting     Doxycycline Rash     Vancomycin Rash     Rash after receiving vancomycin 3/28/16 (infusion reaction?). Tolerated with slower infusion and diphenhydramine premed.            Physical Exam:   Vitals were reviewed  Patient Vitals for the past 24 hrs:   BP Temp Temp src Pulse Resp SpO2 Height Weight   07/05/23 1122 120/82 99  F (37.2  C) Oral 84 20 98 % -- --   07/05/23 1121 -- -- -- -- -- -- 1.803 m (5' 11\") 87.7 kg (193 lb 6.4 oz)   07/05/23 1019 128/79 98.6  F (37  C) Oral 82 18 98 % -- --   07/05/23 0814 122/81 99.1  F (37.3  C) -- 83 -- 98 % -- --   07/05/23 0415 122/81 -- -- 80 -- 97 % -- --   07/04/23 2046 (!) 124/94 -- -- -- -- -- -- --   07/04/23 1930 (!) 124/94 -- -- 92 -- 99 % -- --   07/04/23 1507 -- -- -- -- 20 -- " -- --   07/04/23 1502 (!) 137/92 98.1  F (36.7  C) Oral 91 -- 98 % 1.829 m (6') 86.2 kg (190 lb)       Physical Examination:  GENERAL:  Awake, alert, oriented, interactive. Sitting on edge of bed in NAD.  HEENT:  Head is normocephalic, atraumatic   EYES:  Eyes have anicteric sclerae without conjunctival injection  ENT:  Oropharynx is moist  LUNGS:  Clear to auscultation bilateral.   CARDIOVASCULAR:  RRR, +S1/S2, ?soft systolic murmur  ABDOMEN:  Soft, nondistended.   SKIN:  +ecchymosis on abdomen at lovenox injection sites. Left forearm and dorsum of hand with edema and pink erythema, no nodules or vesicles.  PIV in place without any surrounding erythema or exudate. No stigmata of endocarditis.  NEUROLOGIC:  Grossly nonfocal. Active x4 extremities         Laboratory Data:     Inflammatory Markers    Recent Labs   Lab Test 07/04/23  1556 05/09/23  1230 07/16/22  2216 07/07/22  1231 02/23/22  1621 07/28/20  1607 05/14/19  1145 01/23/18  0845 01/20/18  1030 09/24/17  1900   SED 17  --  18*  --  10 10 13 10 11 31*   CRPI 89.60* <3.00  --  47.70*  --   --   --   --   --   --        Hematology Studies    Recent Labs   Lab Test 07/05/23  0627 07/04/23  1556 07/03/23  0226 06/06/23  0702 06/05/23  0655 06/04/23  2047 07/13/21  1110 06/29/21  1016 06/14/21  1017 06/09/21  1044 06/01/21  1117 05/25/21  1147 05/19/21  1342 05/18/21  1015   WBC 10.0 10.3 12.3* 8.8 10.8 6.9   < > 6.9 8.1 7.5 7.3   < > 6.1 6.2   ANEU  --   --   --   --   --   --   --  3.1 4.1 4.7 4.3  --  3.5 3.7   AEOS  --   --   --   --   --   --   --  0.2 0.2 0.3 0.1  --  0.2 0.2   HGB 10.7* 11.0* 11.7* 11.8* 11.4* 10.5*   < > 12.1* 12.0* 13.3 12.1*   < > 12.5* 12.6*   MCV 98 100 95 95 95 94   < > 96 97 96 94   < > 97 98    194 189 134* 111* 91*   < > 178 158 169 174   < > 159 145*    < > = values in this interval not displayed.       Metabolic Studies     Recent Labs   Lab Test 07/05/23  0627 07/04/23  1556 07/03/23  0226 06/13/23  1010 06/06/23  6422  06/05/23  0655 06/04/23 2047    139 137  --   --  137 137   POTASSIUM 3.9 3.9 3.7  --   --  3.8 3.3*   CHLORIDE 108* 103 99  --   --  103 102   CO2 24 25 24  --   --  25 24   BUN 16.9 12.7 23.3*  --   --  10.7 10.5   CR 0.71 0.69 0.86 0.73 0.63* 0.74 0.75   GFRESTIMATED >90 >90 >90 >90 >90 >90 >90       Hepatic Studies    Recent Labs   Lab Test 07/04/23  1556 06/04/23 2047 05/28/23  1833 05/09/23  1230 04/11/23  1235 03/14/23  1327   BILITOTAL 0.7 0.3 0.4 0.3 0.2 0.3   ALKPHOS 81 74 64 90 72 75   ALBUMIN 4.1 3.1* 3.8 3.9 3.9 3.6   AST 24 28 19 17 22 17   ALT 17 25 20 31 25 23       Microbiology:  Culture   Date Value Ref Range Status   07/04/2023 Positive on the 1st day of incubation (A)  Preliminary   07/04/2023 Gram positive cocci in clusters (AA)  Preliminary     Comment:     2 of 2 bottles   07/04/2023 No growth after 12 hours  Preliminary   07/03/2023 Positive on the 1st day of incubation (A)  Preliminary   07/03/2023 Staphylococcus epidermidis (AA)  Preliminary     Comment:     2 of 2 bottles   07/03/2023 Positive on the 1st day of incubation (A)  Preliminary   07/03/2023 Staphylococcus epidermidis (AA)  Preliminary     Comment:     2 of 2 bottles   06/10/2023 No Growth  Final   06/10/2023 No Growth  Final   06/05/2023 No Growth  Final   06/04/2023 Positive on the 1st day of incubation (A)  Final   06/04/2023 Klebsiella pneumoniae (AA)  Final     Comment:     1 of 2 bottles  Susceptibilities done on previous culture   06/04/2023 Enterococcus faecalis (AA)  Final     Comment:     1 of 2 bottlesSusceptibilities done on previous cultures   06/04/2023 Klebsiella oxytoca (AA)  Final     Comment:     1 of 2 bottles  Susceptibilities done on previous cultures     06/04/2023 Positive on the 1st day of incubation (A)  Final   06/04/2023 Klebsiella pneumoniae (AA)  Final     Comment:     2 of 2 bottles   06/04/2023 Enterococcus faecalis (AA)  Final     Comment:     2 of 2 bottles   06/04/2023 Klebsiella oxytoca  (AA)  Final     Comment:     2 of 2 bottles     05/12/2023 No Growth  Final   05/12/2023 No Growth  Final   01/21/2023 No Growth  Final   01/21/2023 No Growth  Final   07/16/2022 No Growth  Final   07/16/2022 No Growth  Final   07/16/2022 No Growth  Final   07/09/2022 No Growth  Final   07/09/2022 No Growth  Final   07/08/2022 No Growth  Final   07/08/2022 No Growth  Final   07/08/2022 No Growth  Final   07/08/2022 No Growth  Final   07/07/2022 Positive on the 1st day of incubation (A)  Final   07/07/2022 Enterobacter cloacae complex (AA)  Final     Comment:     2 of 2 bottles  Susceptibilities done on previous cultures     07/07/2022 Bacillus cereus group, not anthracis (AA)  Final     Comment:     2 of 2 bottles  Identification obtained by MALDI-TOF mass spectrometry research use only database. Test characteristics determined and verified by the [facility].  Susceptibilities done on previous cultures   07/07/2022 Lactococcus lactis (AA)  Final     Comment:     1 of 2 bottles  Susceptibilities done on previous cultures     07/07/2022 Positive on the 1st day of incubation (A)  Final   07/07/2022 Enterobacter cloacae complex (AA)  Final     Comment:     2 of 2 bottles  Susceptibilities done on previous cultures   07/07/2022 Bacillus cereus group, not anthracis (AA)  Final     Comment:     2 of 2 bottles  Identification obtained by MALDI-TOF mass spectrometry research use only database. Test characteristics determined and verified by the [facility].  Susceptibilities done on previous cultures   07/07/2022 Lactococcus lactis (AA)  Final     Comment:     2 of 2 bottles  Susceptibilities done on previous cultures   07/05/2022 Positive on the 1st day of incubation (A)  Final   07/05/2022 Lactococcus lactis (AA)  Final     Comment:     2 of 2 bottles  Susceptibilities done on previous cultures   07/05/2022 Enterobacter cloacae complex (AA)  Final     Comment:      2 of 2 bottles  Susceptibilities done on previous cultures   07/05/2022 Bacillus cereus group, not anthracis (AA)  Final     Comment:     2 of 2 bottles  Identification obtained by MALDI-TOF mass spectrometry research use only database. Test characteristics determined and verified by the [facility].  Susceptibilities done on previous cultures   07/05/2022 Positive on the 1st day of incubation (A)  Final   07/05/2022 Enterobacter cloacae complex (AA)  Final     Comment:     2 of 2 bottles   07/05/2022 Bacillus cereus group, not anthracis (AA)  Final     Comment:     2 of 2 bottles  Identification obtained by MALDI-TOF mass spectrometry research use only database. Test characteristics determined and verified by the [facility].   07/05/2022 Lactococcus lactis (AA)  Final     Comment:     2 of 2 bottles     07/05/2022 Positive on the 1st day of incubation (A)  Final   07/05/2022 Lactococcus lactis (AA)  Final     Comment:     2 of 2 bottles  Susceptibilities done on previous cultures       05/12/2022 No Growth  Final   05/12/2022 No Growth  Final   05/11/2022 No Growth  Final   05/11/2022 No Growth  Final   05/10/2022 No Growth  Final   05/10/2022 No Growth  Final   05/09/2022 No Growth  Final   05/09/2022 No Growth  Final   05/08/2022 Positive on the 1st day of incubation (A)  Final   05/08/2022 Staphylococcus haemolyticus (AA)  Final     Comment:     1 of 2 bottles  Susceptibilities done on previous cultures   05/08/2022 Positive on the 1st day of incubation (A)  Final   05/08/2022 Staphylococcus haemolyticus (AA)  Final     Comment:     Susceptibilities done on previous cultures  2 of 2 bottles   05/08/2022 Staphylococcus epidermidis (AA)  Final     Comment:     Susceptibilities done on previous cultures  2 of 2 bottles   05/07/2022 Positive on the 1st day of incubation (A)  Final   05/07/2022 Staphylococcus epidermidis (AA)  Final     Comment:     2 of 2  bottles  Susceptibilities done on previous cultures   05/07/2022 Positive on the 1st day of incubation (A)  Final   05/07/2022 Staphylococcus epidermidis (AA)  Final     Comment:     2 of 2 bottles  Susceptibilities done on previous cultures   05/07/2022 Staphylococcus haemolyticus (AA)  Final     Comment:     1 of 2 bottles   05/07/2022 No Growth  Final   05/05/2022 Positive on the 1st day of incubation (A)  Final   05/05/2022 Staphylococcus epidermidis (AA)  Final     Comment:     Susceptibilities done on previous cultures   05/05/2022 Positive on the 1st day of incubation (A)  Final   05/05/2022 Staphylococcus epidermidis (AA)  Final     Comment:     2 of 2 bottles   05/05/2022 Positive on the 1st day of incubation (A)  Final   05/05/2022 Staphylococcus epidermidis (AA)  Final     Comment:     2 of 2 bottles  Susceptibilities done on previous cultures   04/06/2022 No Growth  Final   04/06/2022 No Growth  Final   04/06/2022 No Growth  Final   02/24/2022 No Growth  Final   02/23/2022 No Growth  Final   02/23/2022 No Growth  Final   01/13/2022 No Growth  Final   01/13/2022 No Growth  Final   01/11/2022 No Growth  Final   01/11/2022 No Growth  Final   01/10/2022 <15 CFU Staphylococcus epidermidis (A)  Final     Comment:     Susceptibilities not routinely done   01/10/2022 Positive on the 2nd day of incubation (A)  Final   01/10/2022 Staphylococcus hominis (AA)  Final     Comment:     1 of 2 bottles  Susceptibilities done on previous cultures   01/10/2022 Positive on the 3rd day of incubation (A)  Final   01/10/2022 Staphylococcus hominis (AA)  Final     Comment:     1 of 2 bottles  Susceptibilities done on previous cultures   01/09/2022 Positive on the 2nd day of incubation (A)  Final   01/09/2022 Staphylococcus epidermidis (AA)  Final     Comment:     1 of 2 bottles     01/09/2022 Staphylococcus epidermidis (AA)  Final     Comment:     1 of 2 bottles   01/09/2022 No Growth  Final   01/08/2022 Positive on the 1st day  of incubation (A)  Final   01/08/2022 Staphylococcus epidermidis (AA)  Final     Comment:     2 of 2 bottles  Susceptibilities done on previous cultures   01/08/2022 Positive on the 1st day of incubation (A)  Final   01/08/2022 Staphylococcus epidermidis (AA)  Final     Comment:     2 of 2 bottles  Susceptibilities done on previous cultures   01/08/2022 Staphylococcus hominis (AA)  Final     Comment:     2 of 2 bottles  Susceptibilities done on previous cultures   01/06/2022 Positive on the 1st day of incubation (A)  Final   01/06/2022 Staphylococcus epidermidis (AA)  Final     Comment:     2 of 2 bottles   01/06/2022 Staphylococcus hominis (AA)  Final     Comment:     1 of 2 bottles   01/06/2022 Positive on the 1st day of incubation (A)  Final   01/06/2022 Staphylococcus hominis (AA)  Final     Comment:     2 of 2 bottles  Susceptibilities done on previous cultures   01/06/2022 Staphylococcus epidermidis (AA)  Final     Comment:     2 of 2 bottles  Susceptibilities done on previous cultures       Urine Studies    Recent Labs   Lab Test 06/05/23  0820 01/21/23  0916 07/16/22  2200 07/07/22  2223 05/07/22  1520   LEUKEST Negative Negative Negative Negative Negative   WBCU 4 0 1 2 0       Vancomycin Levels    Recent Labs   Lab Test 06/13/23  1010 06/06/23  2313 07/21/22  1030   VANCOMYCIN 11.9 6.7 10.5       Hepatitis B Testing   Recent Labs   Lab Test 08/29/22  1400 07/12/22  1718 07/08/22  0552   HBCAB  --  Nonreactive  --    HEPBANG Nonreactive  --  Reactive*   HBEABY  --  Negative  --    HBEAGN  --  Negative  --      Hepatitis C Testing     Hepatitis C Antibody   Date Value Ref Range Status   07/08/2022 Nonreactive Nonreactive Final   09/20/2017 Nonreactive NR^Nonreactive Final     Comment:     Assay performance characteristics have not been established for newborns,   infants, and children       Respiratory Virus Testing    No results found for: RS, FLUAG          Imaging:     XR hand left (7/4/23)  IMPRESSION:  Soft tissue swelling in the metacarpal region, wrist and distal forearm. No soft tissue emphysema. No fractures are evident. Degenerative changes in the first CMC joint.    XR forearm left (7/4/23)  IMPRESSION: Soft tissue swelling in the forearm has increased. No soft tissue fractures.    US upper extremity (7/3/23)  IMPRESSION:   1.  Deep venous thrombosis in left subclavian vein.  2.  Deep venous thrombosis in right internal jugular vein.

## 2023-07-05 NOTE — PROGRESS NOTES
Patient currently wearing a fentanyl patch from home. States it is due for a change on 7/5/23 at 2000.

## 2023-07-05 NOTE — PROGRESS NOTES
CLINICAL NUTRITION SERVICES - ASSESSMENT NOTE     Nutrition Prescription    RECOMMENDATIONS FOR MDs/PROVIDERS TO ORDER:    Paged provider. Consult received for TF. Pt on TPN PTA, believe provider is looking for TPN recs. Need TPN consult.     Recommend adjusting back to central parenteral nutrition as able as peripheral parenteral nutrition limits amount of nutrition we can provide therefore patient is currently underfed by PPN.     Malnutrition Status:    Patient does not meet two of the established criteria necessary for diagnosing malnutrition    Recommendations already ordered by Registered Dietitian (RD):    Regular diet as tolerated     Recommend only using Peripheral PN for short-term nutrition support, ~1-2 week duration    Use dosing weight 86.2 kg (admission weight)    ClinimixE 4.25% AA, 5% dextrose concentration (peripheral line) -Provides 675 mOsm/L (appropriate within limitations of PPN)    PPN at rate of 83 mL/hr (1992 mL/day) to provide 100g Dex daily (340 kcal, GIR 0.8 mg/kg/min), 85 g AA daily (340 kcal), and 250 ml 20% IV lipids 5-days per week (M-F) for total provision of 1037 kcals (12 kcal/kg/day), 1.0 g PRO/kg/day, and 34% kcals from Fat.    Future/Additional Recommendations:    Monitor for ability to resume central PN    Monitor PO     REASON FOR ASSESSMENT  Parker Acevedo is a/an 52 year old male assessed by the dietitian for Pharmacy/Nutrition to Start and Manage PN    Patient admitted for left subclavian DVT, possible concurrent cellulitis, and a recent untreated MRSE bacteremia - all most likely caused by his exisitng PICC ruben.    MEDICAL HISTORY  RUE DVT on Lovenox (02/2022), gram-negative bacteremia for he has a PICC line in place, fungemia, Celestino-en-Y gastric bypass surgery (2003) c/b short gut syndrome with bowel dysmotility and malabsorption requiring TPN, and most recent DVT identified in his right IJV and left subclavian vein at [facility] on 07/03/2023 for which he left  "AMA    NUTRITION HISTORY  Pt on TPN via PICC PTA d/t short gut syndrome, complications of RYNGB in 2003. Pt also has hx of macrocytic anemia and is on cyanocobalamin 1000 mcg IM monthly.   [facility] reports [facility]. Per [facility], home TPN was:  MWF:  1000 mL TPN + 250 mL lipids (1250 mL total volume) run over 12 hours with 1 hr taper up and down.   T, Th, Sat, Sun: 1250 mL TPN (no lipids) run over 12 hours with 1 hr taper up and down.  Daily TPN consists of 100 g AA, 175 g dex; Na 50 mEq, K 70 mEq, Ca 12 mEq, Mg 6 mEq, Phos 15 mmol, 29% chloride, infuvite 10 mL, trelement 1 mL, famotidine 20 mg, zinc 5 mg (in addition to trelement).    Pt reports eating about 300-400 kcal/day. He reports worsening reflux difficulties d/t hiatal hernia and tolerates grains best (bread, pasta).    CURRENT NUTRITION ORDERS  Diet: Regular    Intake/Tolerance: No documented intakes to assess.    LABS    Zinc WNL 5/16/23    Copper WNL 5/19/23    Maganese and Selenium WNL 5/09/23    TG WNL 5/09/23 07/03/23 02:26 07/04/23 15:56 07/05/23 06:27   Sodium 137 139 143   Potassium 3.7 3.9 3.9   Chloride 99 103 108 (H)   Carbon Dioxide (CO2) 24 25 24   Urea Nitrogen 23.3 (H) 12.7 16.9   Creatinine 0.86 0.69 0.71   GFR Estimate >90 >90 >90   Calcium 9.0 9.2 8.8   Anion Gap 14 11 11   Albumin  4.1    Protein Total  7.3    Alkaline Phosphatase  81    ALT  17    AST  24    Bilirubin Total  0.7    CRP Inflammation  89.60 (H)    Glucose 96 95 96   Lactic Acid 0.7 0.9        MEDICATIONS    Cyanocobalamin 1000 mcg injection monthly    protonix    carafate     Vancomycin    zofran PRN     Senna PRN    ANTHROPOMETRICS  Height: 182.9 cm (6' 0\")  Most Recent Weight: 86.2 kg (190 lb)    IBW: 80.9 kg  Body mass index is 25.77 kg/m . BMI Category: Overweight BMI 25-29.9  Weight History: No significant weight loss noted.  Wt Readings from Last 15 Encounters:   07/04/23 86.2 kg (190 lb)   06/07/23 87.7 kg (193 lb 6.4 oz)   06/04/23 87.1 kg (192 lb)   06/02/23 79 " kg (174 lb 1.6 oz)   06/01/23 85.9 kg (189 lb 6.4 oz)   05/28/23 84.9 kg (187 lb 1.6 oz)   01/21/23 82.6 kg (182 lb)   01/13/23 85.3 kg (188 lb)   09/27/22 80.1 kg (176 lb 8 oz)   08/10/22 76.7 kg (169 lb 3.2 oz)   08/09/22 76.7 kg (169 lb)   07/16/22 77.1 kg (170 lb)   07/12/22 84.1 kg (185 lb 6.5 oz)   07/02/22 79.4 kg (175 lb)   05/10/22 81.5 kg (179 lb 9.6 oz)         ASSESSED NUTRITION NEEDS  Dosing Weight: 86.2 kg (Admission weight)   Estimated Energy Needs: 0908-4278 kcals/day (25 - 30 kcals/kg)  Justification: Maintenance  Estimated Protein Needs:  grams protein/day (1 - 1.2 grams of pro/kg)  Justification: Increased needs  Estimated Fluid Needs: 3621-0277 mL/day (1 mL/kcal)   Justification: Maintenance    PHYSICAL FINDINGS  See malnutrition section below.    Preet Score: 21  Per EMR: Skin  Skin WDL: .WDL except (cellulitis to left hand)    MALNUTRITION  % Intake: No decreased intake noted  % Weight Loss: None noted  Subcutaneous Fat Loss: None observed   Muscle Loss: None observed  Fluid Accumulation/Edema: Does not meet criteria  Malnutrition Diagnosis: Patient does not meet two of the established criteria necessary for diagnosing malnutrition    NUTRITION DIAGNOSIS  Altered GI function related to short gut, RYNGB as evidenced by reliance on parenteral nutrition support.       INTERVENTIONS  Implementation  Nutrition Education: will be provided if nutrition education needs arise.    Collaboration with other providers  Parenteral Nutrition/IV Fluids - Initiate     Goals  Total avg nutritional intake to meet a minimum of 15 kcal/kg and 1.0 g PRO/kg daily (per dosing wt 86.2 kg).        Monitoring/Evaluation  Progress toward goals will be monitored and evaluated per protocol.    Elaine Allan RDN, LD  6D/ED RD pager: [phone]  Weekend/Holiday RD pager: [phone]

## 2023-07-05 NOTE — PROGRESS NOTES
Care Management Follow Up    Length of Stay (days): 1    Expected Discharge Date: 07/06/2023     Concerns to be Addressed:     Phone call from [facility]  Additional Information:  12:45 SW received a call from [facility] letting SW know that pt is working with them and that he is receiving nursing care.    1:40pm: SW spoke with a nurse from [facility] who told SW that she doesn't feel the pt is safe to go home due to at least 10 falls. She also let SW know that pt's wife said that she doesn't think pt is safe to discharge home.   SW followed up with RNCC and charge nurse. Pt is up and walking around and charge is questioning the validity of him falling.   RNCC is following up with [facility] on home care and questionable falls.    2:20pm. [facility] mixed up pt. Please see RNCC note for reasons for their concerns.    SW to follow and assist with any other discharge needs that may arise.  MADISON Mcgowan   7D    Phone: [phone]  Pager: [phone]

## 2023-07-05 NOTE — PROGRESS NOTES
Brief Care Coordination note:     Writer received message that patient had home nursing through [facility] (ph: [phone]).  Writer called [facility], as charge RN reported they had concerns about continuing to care for patient.  Per [facility] , patient has been non-compliant, he has been refusing nursing visits and line cares.  The  stated the nurses do not feel safe caring for him because of this.  They believe patient would need to go to skilled nursing facility to complete line cares appropriately.  Writer updated team, charge RN, and Dr. Paredes.        Lizz Valdez, RN, BSN  7D RN Care Coordinator    [address] [address]  [email or web address]  Gender pronouns: she/her  Pager: [phone]

## 2023-07-05 NOTE — PROGRESS NOTES
[facility]    Progress Note - Medicine Service, MAROON TEAM 5       Date of Admission:  7/4/2023    Assessment & Plan   Parker Acevedo is a 52 year old male admitted on 7/4/2023 for left subclavian DVT, possible concurrent cellulitis, and a recent untreated MRSE bacteremia - all most likely caused by his exisitng PICC line . He has a history of catheter-associated infections and venous occlusions.     Updates:  - ID recs appreciated   > TTE, RONEL, Blood cultures daily until cleared, Continue vanc     #Possible LUE nonpurulent cellulitis  #Recent MRSE bacteremia  #Hx of Cm catheter-associated polymicrobial bacteremia (06/2023)  #Hx of fungemia  Blood cx's from [place] grew methicillin-resistant Staph Epidermidis. Believe these infections were also provoked by his existing PICC line and poor line care. C/f endocarditis d/t duration of bacteremia.   - ID recs appreciated   > Continue vanc   > TTE and RONEL   > Blood cultures until cleared    #Left subclavian DVT  #Hx of catheter-provoked R axillary vein DVT (02/2022)  Identified on US completed at [place] ED. Believe this DVT was also provoked by his existing PICC line. No recent traumas or surgeries. No past medical history of any malignancies and patient is otherwise asymptomatic with relatively unremarkable blood work/recent imaging. Patient reports that he is compliant with his Lovenox at home.   His first DVT was identified on 02/23/2022 in his right subclavian and found to be associated with his Cm catheter.  -Restarted home Lovenox 80 mg SQ BID  -Consider IR consult see if patient can get a new Cm catheter reinserted prior to discharge  -Patient not a good candidate for Eliquis due to his poor GI absorption since getting gastric bypass (2003)     #Short gut syndrome  #Malabsorption requiring TPN  Complication of Celestino-en-Y gastric bypass surgery (2003)   -Resumed home Oxycodone & Fentanyl patch  "for pain control  -Pharm on consult for TPN management via PIV     Chronic/Stable Conditions     #Paroxysmal atrial fibrillation  Patient reports that he has a history of random \"palpitations\" that have been going for several decades.  HR < 100. Last EKG noted NSR  -Resumed home Coreg for rate control   -Resumed home Lovenox for AC     #Essential Hypertension  -Resumed home Coreg     #ADHD  -Resumed home adderall     #Macrocytic anemia  Likely from Vit B12 deficiency 2/2 poor GI absorption  -Receives monthly Cyanocobalamin 1000 mcg IM     #Anxiety disorder  -Resumed home ativan     #Tobacco use  -Started nicotine patches 7mg/24hr     Diet: Combination Diet Regular Diet Adult  parenteral nutrition - Clinimix E    DVT Prophylaxis: DOAC  Sanchez Catheter: Not present  Fluids: PO  Lines: PRESENT      [REMOVED] PICC 06/08/23 Double Lumen Left Basilic TPN, antibiotics-Site Assessment: WDL except;Red;Painful      Cardiac Monitoring: None  Code Status: Full Code      Clinically Significant Risk Factors                         # Overweight: Estimated body mass index is 26.97 kg/m  as calculated from the following:    Height as of this encounter: 1.803 m (5' 11\").    Weight as of this encounter: 87.7 kg (193 lb 6.4 oz)., PRESENT ON ADMISSION          Disposition Plan      Expected Discharge Date: 07/08/2023                The patient's care was discussed with the Attending Physician, Dr. Shanae Gonzalez.    Rush Chairez MD  Medicine Service, [facility] TEAM [address] [place] [address]  Securely message with [employer] (more info)  Text page via [facility] Paging/Directory   See signed in provider for up to date coverage information  ______________________________________________________________________    Interval History   NAEON. Pt having pain in left arm, but wishing to go home and be treated. Discussed with pt at bedside that will continue IV within the hospital as needed and discharge when able, " and he was very agreeable.     Physical Exam   Vital Signs: Temp: 98.2  F (36.8  C) Temp src: Oral BP: 118/84 Pulse: 100   Resp: 18 SpO2: 97 % O2 Device: None (Room air)    Weight: 193 lbs 6.4 oz    Constitutional: awake  Eyes: vision intact  ENT: atramatic  Hematologic / Lymphatic: no cervical lymphadenopathy and no supraclavicular lymphadenopathy  Respiratory: Clear to auscultation BL  Cardiovascular: Normal S1/S2 with no murmurs, rub, or gallops   GI: Non-distended, normal bowel sounds, non-tender  Skin: no bruising or bleeding and normal skin color, texture, turgor  Musculoskeletal: LUE edema from forearm distally with slight erythema and tenderness on palpation.   Neurologic: Awake, alert, oriented to name, place and time.       Medical Decision Making       Please see A&P for additional details of medical decision making.      Data     I have personally reviewed the following data over the past 24 hrs:    10.0  \   10.7 (L)   / 194     143 108 (H) 16.9 /  99   3.9 24 0.71 \       Imaging results reviewed over the past 24 hrs:   No results found for this or any previous visit (from the past 24 hour(s)).

## 2023-07-05 NOTE — PROVIDER NOTIFICATION
"Provider Notification     Rush Monteiro (#[id number]) paged at 1140:  \"[id number] T.S.  Hi, 7D does not give IV benadryl per policy. Please discontinue IV benadryl and order PO so it can be given prior to IV vanco.    Thanks,   Diane\"   "

## 2023-07-05 NOTE — PROGRESS NOTES
"Patient has been educated on potential risks of choosing to leave the unit and that the responsibility for patient well-being will belong to the patient. Pt has been informed that admission to hospital is due to need for medical treatment. Education given to the patient on some of the potential risks included but are not limited to:      - lack of access to nursing intervention      - possible missed appointments with MD, therapies, tests      - possible missed medications, antibiotics, management of IV's    Patient Response: \"okay well I'll be back\"      Patient notified staff prior to leaving unit: Yes   Coban wrap placed over IV prior to pt leaving unit Yes.     "

## 2023-07-05 NOTE — PLAN OF CARE
[id number]  Pt admitted for DVT and cellulitis in L arm. Hx: ADD, Celestino-en-y gastric bypass, short gut syndrome, and central line infections. A&Ox4. Cooperative. Pt is on chronic TPN d/t short gut syndrome.Although patient does not have PICC, so writer reached out to team awaiting orders for PPN. Pt getting IV Vanco but needs to be premedicated before medication can be given as he has reaction to the Vanco. Goes for frequent walks. Eats small meals. PIV-SL inbetween iv abx treatments.

## 2023-07-05 NOTE — PROGRESS NOTES
Clinic Care Coordination Contact  Ambulatory Care Coordination to Inpatient Care Management   Hand-In Communication    Date:  July 5, 2023  Name: Parker Acevedo is enrolled in Ambulatory Care Coordination program and I am the Lead Care Coordinator.  CC Contact Information: Epic InNEAH Power Systemssket + phone  Payor Source: Payor: [facility] / Plan: [facility] MEDICARE ADVANTAGE / Product Type: Medicare /   Current services in place:     Please see the CC Snaphot and Care Management Flowsheets for specific  details of this Parker Acevedo care plan.   Additional details/specific concerns r/t this admission:    No additional concerns at this time .    I will follow this admission in Epic. Please feel free to contact me with questions or for further collaboration in discharge planning.    Kinsey Muhammad RN Care Coordination   [facility]  Email: [email or web address]  Phone: [phone]

## 2023-07-05 NOTE — PLAN OF CARE
Goal Outcome Evaluation:      Plan of Care Reviewed With: patient, family    Overall Patient Progress: no changeOverall Patient Progress: no change    Outcome Evaluation: see RD note 7/05    Elaine Allan RDN, LD  6D/ED RD pager: [phone]  Weekend/Holiday RD pager: [phone]

## 2023-07-05 NOTE — TELEPHONE ENCOUNTER
Attempted contacting patient/patient's spouse regarding home care report.  Left message.  Patient has appt for 7/6/23 with Dr. Vyas to have G-tube exchanged.

## 2023-07-05 NOTE — PLAN OF CARE
[id number]    A&Ox4. VSS on RA. Afebrile. C/o abdominal and left arm pain, given prn oxycodone x1. Nausea managed with prn zofran x1. Denies SOB. Fair appetite. Orders to resume PTA TPN and lipids this evening. Voids spontaneously without saving. Up independently. Plan for echo tomorrow, pt will be NPO at midnight. No acute events this shift. Continue with plan of care.

## 2023-07-06 ENCOUNTER — ANESTHESIA EVENT (OUTPATIENT)
Dept: SURGERY | Facility: CLINIC | Age: 53
DRG: 315 | End: 2023-07-06
Payer: COMMERCIAL

## 2023-07-06 ENCOUNTER — APPOINTMENT (OUTPATIENT)
Dept: CARDIOLOGY | Facility: CLINIC | Age: 53
DRG: 315 | End: 2023-07-06
Payer: COMMERCIAL

## 2023-07-06 ENCOUNTER — TELEPHONE (OUTPATIENT)
Dept: ENDOCRINOLOGY | Facility: CLINIC | Age: 53
End: 2023-07-06

## 2023-07-06 ENCOUNTER — TELEPHONE (OUTPATIENT)
Dept: INTERNAL MEDICINE | Facility: CLINIC | Age: 53
End: 2023-07-06

## 2023-07-06 LAB
ALBUMIN SERPL BCG-MCNC: 3.9 G/DL (ref 3.5–5.2)
ALP SERPL-CCNC: 69 U/L (ref 40–129)
ALT SERPL W P-5'-P-CCNC: 14 U/L (ref 0–70)
AMPHETAMINES UR QL SCN: ABNORMAL
AMPHETAMINES UR QL SCN: ABNORMAL
ANION GAP SERPL CALCULATED.3IONS-SCNC: 10 MMOL/L (ref 7–15)
AST SERPL W P-5'-P-CCNC: 17 U/L (ref 0–45)
BACTERIA BLD CULT: ABNORMAL
BACTERIA BLD CULT: ABNORMAL
BARBITURATES UR QL SCN: ABNORMAL
BARBITURATES UR QL SCN: ABNORMAL
BENZODIAZ UR QL SCN: ABNORMAL
BENZODIAZ UR QL SCN: ABNORMAL
BILIRUB DIRECT SERPL-MCNC: <0.2 MG/DL (ref 0–0.3)
BILIRUB SERPL-MCNC: 0.5 MG/DL
BUN SERPL-MCNC: 17.1 MG/DL (ref 6–20)
BZE UR QL SCN: ABNORMAL
BZE UR QL SCN: ABNORMAL
CALCIUM SERPL-MCNC: 8.8 MG/DL (ref 8.6–10)
CANNABINOIDS UR QL SCN: ABNORMAL
CANNABINOIDS UR QL SCN: ABNORMAL
CHLORIDE SERPL-SCNC: 106 MMOL/L (ref 98–107)
CREAT SERPL-MCNC: 0.76 MG/DL (ref 0.67–1.17)
DEPRECATED HCO3 PLAS-SCNC: 24 MMOL/L (ref 22–29)
GFR SERPL CREATININE-BSD FRML MDRD: >90 ML/MIN/1.73M2
GLUCOSE BLDC GLUCOMTR-MCNC: 103 MG/DL (ref 70–99)
GLUCOSE BLDC GLUCOMTR-MCNC: 105 MG/DL (ref 70–99)
GLUCOSE SERPL-MCNC: 142 MG/DL (ref 70–99)
HOLD SPECIMEN: NORMAL
INR PPP: 1.13 (ref 0.85–1.15)
LVEF ECHO: NORMAL
MAGNESIUM SERPL-MCNC: 2.1 MG/DL (ref 1.7–2.3)
OPIATES UR QL SCN: ABNORMAL
OPIATES UR QL SCN: ABNORMAL
PCP QUAL URINE (ROCHE): ABNORMAL
PCP QUAL URINE (ROCHE): ABNORMAL
PHOSPHATE SERPL-MCNC: 3.3 MG/DL (ref 2.5–4.5)
POTASSIUM SERPL-SCNC: 3.7 MMOL/L (ref 3.4–5.3)
PREALB SERPL IA-MCNC: 14 MG/DL (ref 15–45)
PROT SERPL-MCNC: 6.8 G/DL (ref 6.4–8.3)
SODIUM SERPL-SCNC: 140 MMOL/L (ref 136–145)

## 2023-07-06 PROCEDURE — 87040 BLOOD CULTURE FOR BACTERIA: CPT

## 2023-07-06 PROCEDURE — 82248 BILIRUBIN DIRECT: CPT | Performed by: STUDENT IN AN ORGANIZED HEALTH CARE EDUCATION/TRAINING PROGRAM

## 2023-07-06 PROCEDURE — 250N000013 HC RX MED GY IP 250 OP 250 PS 637: Performed by: STUDENT IN AN ORGANIZED HEALTH CARE EDUCATION/TRAINING PROGRAM

## 2023-07-06 PROCEDURE — 93306 TTE W/DOPPLER COMPLETE: CPT | Mod: 26 | Performed by: INTERNAL MEDICINE

## 2023-07-06 PROCEDURE — 250N000013 HC RX MED GY IP 250 OP 250 PS 637

## 2023-07-06 PROCEDURE — 250N000009 HC RX 250: Performed by: STUDENT IN AN ORGANIZED HEALTH CARE EDUCATION/TRAINING PROGRAM

## 2023-07-06 PROCEDURE — 80325 AMPHETAMINES 3OR 4: CPT

## 2023-07-06 PROCEDURE — 250N000009 HC RX 250

## 2023-07-06 PROCEDURE — 93306 TTE W/DOPPLER COMPLETE: CPT

## 2023-07-06 PROCEDURE — 84100 ASSAY OF PHOSPHORUS: CPT | Performed by: STUDENT IN AN ORGANIZED HEALTH CARE EDUCATION/TRAINING PROGRAM

## 2023-07-06 PROCEDURE — 99418 PROLNG IP/OBS E/M EA 15 MIN: CPT | Performed by: INTERNAL MEDICINE

## 2023-07-06 PROCEDURE — 258N000003 HC RX IP 258 OP 636: Performed by: EMERGENCY MEDICINE

## 2023-07-06 PROCEDURE — 999N000127 HC STATISTIC PERIPHERAL IV START W US GUIDANCE

## 2023-07-06 PROCEDURE — 80053 COMPREHEN METABOLIC PANEL: CPT | Performed by: STUDENT IN AN ORGANIZED HEALTH CARE EDUCATION/TRAINING PROGRAM

## 2023-07-06 PROCEDURE — 99207 PR APP CREDIT; MD BILLING SHARED VISIT: CPT | Performed by: STUDENT IN AN ORGANIZED HEALTH CARE EDUCATION/TRAINING PROGRAM

## 2023-07-06 PROCEDURE — 84134 ASSAY OF PREALBUMIN: CPT | Performed by: STUDENT IN AN ORGANIZED HEALTH CARE EDUCATION/TRAINING PROGRAM

## 2023-07-06 PROCEDURE — 36415 COLL VENOUS BLD VENIPUNCTURE: CPT | Performed by: STUDENT IN AN ORGANIZED HEALTH CARE EDUCATION/TRAINING PROGRAM

## 2023-07-06 PROCEDURE — 36415 COLL VENOUS BLD VENIPUNCTURE: CPT

## 2023-07-06 PROCEDURE — 85610 PROTHROMBIN TIME: CPT | Performed by: STUDENT IN AN ORGANIZED HEALTH CARE EDUCATION/TRAINING PROGRAM

## 2023-07-06 PROCEDURE — 250N000009 HC RX 250: Performed by: INTERNAL MEDICINE

## 2023-07-06 PROCEDURE — 80307 DRUG TEST PRSMV CHEM ANLYZR: CPT

## 2023-07-06 PROCEDURE — 99233 SBSQ HOSP IP/OBS HIGH 50: CPT | Performed by: INTERNAL MEDICINE

## 2023-07-06 PROCEDURE — 83735 ASSAY OF MAGNESIUM: CPT | Performed by: STUDENT IN AN ORGANIZED HEALTH CARE EDUCATION/TRAINING PROGRAM

## 2023-07-06 PROCEDURE — 250N000011 HC RX IP 250 OP 636: Mod: JZ

## 2023-07-06 PROCEDURE — 120N000002 HC R&B MED SURG/OB UMMC

## 2023-07-06 PROCEDURE — 250N000011 HC RX IP 250 OP 636: Performed by: EMERGENCY MEDICINE

## 2023-07-06 RX ORDER — ACYCLOVIR 200 MG/1
9.5 CAPSULE ORAL
Status: DISCONTINUED | OUTPATIENT
Start: 2023-07-06 | End: 2023-07-06

## 2023-07-06 RX ORDER — FENTANYL CITRATE 50 UG/ML
25 INJECTION, SOLUTION INTRAMUSCULAR; INTRAVENOUS
Status: DISCONTINUED | OUTPATIENT
Start: 2023-07-06 | End: 2023-07-06

## 2023-07-06 RX ORDER — NALOXONE HYDROCHLORIDE 0.4 MG/ML
0.2 INJECTION, SOLUTION INTRAMUSCULAR; INTRAVENOUS; SUBCUTANEOUS
Status: DISCONTINUED | OUTPATIENT
Start: 2023-07-06 | End: 2023-07-06

## 2023-07-06 RX ORDER — NALOXONE HYDROCHLORIDE 0.4 MG/ML
0.4 INJECTION, SOLUTION INTRAMUSCULAR; INTRAVENOUS; SUBCUTANEOUS
Status: DISCONTINUED | OUTPATIENT
Start: 2023-07-06 | End: 2023-07-06

## 2023-07-06 RX ORDER — ACETAMINOPHEN 650 MG/1
650 SUPPOSITORY RECTAL EVERY 6 HOURS PRN
Status: DISCONTINUED | OUTPATIENT
Start: 2023-07-06 | End: 2023-07-11 | Stop reason: HOSPADM

## 2023-07-06 RX ORDER — LIDOCAINE HYDROCHLORIDE 20 MG/ML
15 SOLUTION OROPHARYNGEAL ONCE
Status: COMPLETED | OUTPATIENT
Start: 2023-07-06 | End: 2023-07-06

## 2023-07-06 RX ORDER — FLUMAZENIL 0.1 MG/ML
0.2 INJECTION, SOLUTION INTRAVENOUS
Status: DISCONTINUED | OUTPATIENT
Start: 2023-07-06 | End: 2023-07-06

## 2023-07-06 RX ORDER — LIDOCAINE 40 MG/G
CREAM TOPICAL
Status: DISCONTINUED | OUTPATIENT
Start: 2023-07-06 | End: 2023-07-07 | Stop reason: HOSPADM

## 2023-07-06 RX ORDER — ACETAMINOPHEN 325 MG/10.15ML
650 LIQUID ORAL EVERY 6 HOURS PRN
Status: DISCONTINUED | OUTPATIENT
Start: 2023-07-06 | End: 2023-07-11 | Stop reason: HOSPADM

## 2023-07-06 RX ORDER — FENTANYL CITRATE 50 UG/ML
50 INJECTION, SOLUTION INTRAMUSCULAR; INTRAVENOUS ONCE
Status: DISCONTINUED | OUTPATIENT
Start: 2023-07-06 | End: 2023-07-06

## 2023-07-06 RX ORDER — FUROSEMIDE 10 MG/ML
10 INJECTION INTRAMUSCULAR; INTRAVENOUS ONCE
Status: DISCONTINUED | OUTPATIENT
Start: 2023-07-06 | End: 2023-07-06

## 2023-07-06 RX ORDER — SODIUM CHLORIDE 9 MG/ML
INJECTION, SOLUTION INTRAVENOUS CONTINUOUS PRN
Status: DISCONTINUED | OUTPATIENT
Start: 2023-07-06 | End: 2023-07-06

## 2023-07-06 RX ADMIN — CARVEDILOL 6.25 MG: 6.25 TABLET, FILM COATED ORAL at 09:24

## 2023-07-06 RX ADMIN — VANCOMYCIN HYDROCHLORIDE 1250 MG: 10 INJECTION, POWDER, LYOPHILIZED, FOR SOLUTION INTRAVENOUS at 20:32

## 2023-07-06 RX ADMIN — ENOXAPARIN SODIUM 80 MG: 80 INJECTION SUBCUTANEOUS at 09:25

## 2023-07-06 RX ADMIN — NYSTATIN: 100000 CREAM TOPICAL at 20:36

## 2023-07-06 RX ADMIN — OXYCODONE HYDROCHLORIDE 10 MG: 5 SOLUTION ORAL at 05:13

## 2023-07-06 RX ADMIN — LIDOCAINE HYDROCHLORIDE 15 ML: 20 SOLUTION ORAL; TOPICAL at 08:50

## 2023-07-06 RX ADMIN — OXYCODONE HYDROCHLORIDE 10 MG: 5 SOLUTION ORAL at 14:47

## 2023-07-06 RX ADMIN — ENOXAPARIN SODIUM 80 MG: 80 INJECTION SUBCUTANEOUS at 20:36

## 2023-07-06 RX ADMIN — SUCRALFATE 1 G: 1 SUSPENSION ORAL at 09:25

## 2023-07-06 RX ADMIN — PANTOPRAZOLE SODIUM 40 MG: 40 TABLET, DELAYED RELEASE ORAL at 09:24

## 2023-07-06 RX ADMIN — DIPHENHYDRAMINE HYDROCHLORIDE 50 MG: 25 SOLUTION ORAL at 19:01

## 2023-07-06 RX ADMIN — DEXTROAMPHETAMINE SACCHARATE, AMPHETAMINE ASPARTATE, DEXTROAMPHETAMINE SULFATE, AND AMPHETAMINE SULFATE 20 MG: 2.5; 2.5; 2.5; 2.5 TABLET ORAL at 09:24

## 2023-07-06 RX ADMIN — ONDANSETRON 4 MG: 4 TABLET, ORALLY DISINTEGRATING ORAL at 12:40

## 2023-07-06 RX ADMIN — SUCRALFATE 1 G: 1 SUSPENSION ORAL at 12:40

## 2023-07-06 RX ADMIN — OXYCODONE HYDROCHLORIDE 10 MG: 5 SOLUTION ORAL at 09:36

## 2023-07-06 RX ADMIN — ASCORBIC ACID, VITAMIN A PALMITATE, CHOLECALCIFEROL, THIAMINE HYDROCHLORIDE, RIBOFLAVIN-5 PHOSPHATE SODIUM, PYRIDOXINE HYDROCHLORIDE, NIACINAMIDE, DEXPANTHENOL, ALPHA-TOCOPHEROL ACETATE, VITAMIN K1, FOLIC ACID, BIOTIN, CYANOCOBALAMIN: 200; 3300; 200; 6; 3.6; 6; 40; 15; 10; 150; 600; 60; 5 INJECTION, SOLUTION INTRAVENOUS at 20:33

## 2023-07-06 RX ADMIN — LORAZEPAM 1 MG: 0.5 TABLET ORAL at 20:36

## 2023-07-06 RX ADMIN — DIPHENHYDRAMINE HYDROCHLORIDE 50 MG: 25 SOLUTION ORAL at 05:13

## 2023-07-06 RX ADMIN — CARVEDILOL 6.25 MG: 6.25 TABLET, FILM COATED ORAL at 17:33

## 2023-07-06 RX ADMIN — VANCOMYCIN HYDROCHLORIDE 1250 MG: 10 INJECTION, POWDER, LYOPHILIZED, FOR SOLUTION INTRAVENOUS at 06:35

## 2023-07-06 RX ADMIN — OXYCODONE HYDROCHLORIDE 10 MG: 5 SOLUTION ORAL at 00:00

## 2023-07-06 RX ADMIN — ASCORBIC ACID, VITAMIN A PALMITATE, CHOLECALCIFEROL, THIAMINE HYDROCHLORIDE, RIBOFLAVIN-5 PHOSPHATE SODIUM, PYRIDOXINE HYDROCHLORIDE, NIACINAMIDE, DEXPANTHENOL, ALPHA-TOCOPHEROL ACETATE, VITAMIN K1, FOLIC ACID, BIOTIN, CYANOCOBALAMIN: 200; 3300; 200; 6; 3.6; 6; 40; 15; 10; 150; 600; 60; 5 INJECTION, SOLUTION INTRAVENOUS at 06:35

## 2023-07-06 RX ADMIN — ACETAMINOPHEN 650 MG: 325 SOLUTION ORAL at 02:11

## 2023-07-06 ASSESSMENT — ACTIVITIES OF DAILY LIVING (ADL)
ADLS_ACUITY_SCORE: 24
DEPENDENT_IADLS:: INDEPENDENT
ADLS_ACUITY_SCORE: 24

## 2023-07-06 NOTE — SEDATION DOCUMENTATION
Patient is prepped for RONEL but raised concerns about adequacy of conscious sedation medication during past procedures.  Patient states that past endoscopies have been unsuccessful due to inadequacy of sedation and that it is recommended that all procedures be performed in the OR under general anesthesia.  Patient is refusing RONEL without general anesthesia.  No medications have been given.  RONEL will be schuduled in the OR.  Patient is transported back to  via stretcher.

## 2023-07-06 NOTE — PROGRESS NOTES
[facility] ID Service: Follow Up Note    Patient: Parker Acevedo, Date of birth 1970, Medical record number [id number]  Date of Visit: July 6, 2023         Assessment and Recommendations:     ASSESSMENT:  1. Complicated MRSE bacteria, secondary to CLABSI  2. DVT, catheter-associated, presumed thrombophlebitis  3. h/o recurrent CLABSI (most recently polymicrobial on 6/4/23)  4. Short gut syndrome on chronic TPN  5. Allergies to penicillins, TMP-SMX, doxycycline; tolerates slow infusion of vancomycin    RECOMMENDATIONS:  1. Continue vancomycin, catheter-associated CoNS bloodstream infection   - Duration TBD, dependent on culture results going forward   - Anticipate need for more extended course given presence of thrombus (3-4 weeks)  2. Continue obtaining daily surveillance blood cultures until negative X 72 hours  3. Echo pending (TTE vs RONEL)  4. Hold on replacing central line until blood cultures negative for 48-72 hours      DISCUSSION:  52 year old male with history of Celestino-en-Y gastric bypass esophagojejunostomy c/b short gut syndrome and chronic malnutrition on TPN, DVT, numerous central line associated infections including an episode of candidemia (1/2019), and bacteremias most recently 6/4/23 with polymicrobial growth on cultures, who presented to ED with arm swelling on 7/3/23 and was found to have left subclavian and right internal jugular DVTs and MRSE bacteremia. PICC pulled on 7/4, interestingly the cath tip is growing another CoNS, Staph hominis.  Blood cultures +MRSE in all bottles collected 7/3, and so far only in 1 of 2 bottles collected on 7/4. NGTD on blood cultures from 7/5, so hopefully he is beginning to clear. Recent bacteremia with Cm removal and PICC line placement (6/8) bleeding from PICC site after placement. 2-3 weeks of intermittent fevers and low-grade temperatures. This admission with LUE swelling and DVT. No respiratory symptoms, dental pain, focal joint pain, other  wounds. Given high-grade bacteremia with >2 weeks of intermittent symptoms, recommend TTE and likely needs RONEL as well. RONEL planned this morning bu unable now since he ate. He has been receiving vancomycin, which is appropriate for this. Will need a more extended duration of antibiotics given presence of presumed infected catheter-associated septic thrombophlebitis. This will be determined by daily surveillance blood cultures until we document clearance. Should also hold off on placing any central lines until blood cultures negative for at least 48-72 hours.         ID will continue to follow. Call anytime with questions or concerns.     Total time on day of visit including chart review, visit, counseling, documentation and coordination of care: 50 minutes     Prakash Alanis MD  [phone]         Interval History:     Nursing notes reviewed. Remains afebrile. Tolerating vancomycin. Cultures as summarized below:    Microbiology:  7/6 Blood  Pending  7/5 Blood  NGTD  7/4 Blood  MRSE (1 of 2 bottles so far)   Cath tip  Staph hominis  7/3 Blood  MRSE (2/2 bottles)    Antibiotics:    Vancomycin: 7/4-         Review of Systems:     CONSTITUTIONAL: No fevers. Negative for rigors, chills, sweats.  EYES: negative for icterus  ENT:  +ear bleeding in late May or early June. Negative for ear pain, sore throat.  RESPIRATORY:  Infrequent cough (at baseline), no sputum or dyspnea  CARDIOVASCULAR: +edema at baseline, uses compression stockings. negative for chest pain, dyspnea  GASTROINTESTINAL:  negative for nausea, vomiting, diarrhea, abd pain  MUSCULOSKELETAL:  improved swelling over left forearm. no specific joint pain, swelling, or redness.  INTEGUMENT: improving swelling, redness, pruritis of left forearm and dorsum of hand. +bruising and nodules at lovenox injection sites. No rashes.   NEURO:  Negative for headache         Physical Exam:   Ranges for vital signs:  Temp:  [97.2  F (36.2  C)-99.1  F (37.3  C)] 98.1  F (36.7   C)  Pulse:  [] 80  Resp:  [14-20] 18  BP: (113-146)/(69-86) 113/80  SpO2:  [97 %-98 %] 97 %    Intake/Output Summary (Last 24 hours) at 7/6/2023 1004  Last data filed at 7/6/2023 0027  Gross per 24 hour   Intake 1090 ml   Output --   Net 1090 ml     Exam:  GENERAL:  Awake, alert, oriented, interactive. Sitting on edge of bed in NAD.  HEENT:  Head is normocephalic, atraumatic   EYES:  Eyes have anicteric sclerae without conjunctival injection  ENT:  Oropharynx is moist  LUNGS:  Clear to auscultation bilateral.   CARDIOVASCULAR:  RRR, +S1/S2, ?soft systolic murmur  ABDOMEN:  Soft, nondistended.   SKIN:  +ecchymosis on abdomen at lovenox injection sites. Left forearm and dorsum of hand with edema and pink erythema, no nodules or vesicles.  PIV in place without any surrounding erythema or exudate. No stigmata of endocarditis.  NEUROLOGIC:  Grossly nonfocal. Active x4 extremities         Laboratory Data:     Creatinine   Date Value Ref Range Status   07/06/2023 0.76 0.67 - 1.17 mg/dL Final   07/05/2023 0.71 0.67 - 1.17 mg/dL Final   07/04/2023 0.69 0.67 - 1.17 mg/dL Final   07/03/2023 0.86 0.67 - 1.17 mg/dL Final   06/13/2023 0.73 0.67 - 1.17 mg/dL Final   06/29/2021 0.69 0.66 - 1.25 mg/dL Final   06/14/2021 0.82 0.66 - 1.25 mg/dL Final   06/09/2021 0.76 0.66 - 1.25 mg/dL Final   06/01/2021 0.60 (L) 0.66 - 1.25 mg/dL Final   05/25/2021 0.81 0.66 - 1.25 mg/dL Final     WBC   Date Value Ref Range Status   06/29/2021 6.9 4.0 - 11.0 10e9/L Final   06/14/2021 8.1 4.0 - 11.0 10e9/L Final   06/09/2021 7.5 4.0 - 11.0 10e9/L Final   06/01/2021 7.3 4.0 - 11.0 10e9/L Final   05/25/2021 6.1 4.0 - 11.0 10e9/L Final     WBC Count   Date Value Ref Range Status   07/05/2023 10.0 4.0 - 11.0 10e3/uL Final   07/04/2023 10.3 4.0 - 11.0 10e3/uL Final   07/03/2023 12.3 (H) 4.0 - 11.0 10e3/uL Final   06/06/2023 8.8 4.0 - 11.0 10e3/uL Final   06/05/2023 10.8 4.0 - 11.0 10e3/uL Final     Hemoglobin   Date Value Ref Range Status    07/05/2023 10.7 (L) 13.3 - 17.7 g/dL Final   06/29/2021 12.1 (L) 13.3 - 17.7 g/dL Final     Platelet Count   Date Value Ref Range Status   07/05/2023 194 150 - 450 10e3/uL Final   06/29/2021 178 150 - 450 10e9/L Final     Sed Rate   Date Value Ref Range Status   07/28/2020 10 0 - 15 mm/h Final   05/14/2019 13 0 - 15 mm/h Final   01/23/2018 10 0 - 15 mm/h Final   01/20/2018 11 0 - 15 mm/h Final   09/24/2017 31 (H) 0 - 15 mm/h Final     Erythrocyte Sedimentation Rate   Date Value Ref Range Status   07/04/2023 17 0 - 20 mm/hr Final   07/16/2022 18 (H) 0 - 15 mm/hr Final   02/23/2022 10 0 - 15 mm/hr Final     CRP Inflammation   Date Value Ref Range Status   10/04/2022 <2.9 0.0 - 8.0 mg/L Final   07/16/2022 4.4 0.0 - 8.0 mg/L Final   05/07/2022 34.0 (H) 0.0 - 8.0 mg/L Final   03/15/2022 <2.9 0.0 - 8.0 mg/L Final   02/23/2022 <2.9 0.0 - 8.0 mg/L Final   06/29/2021 <2.9 0.0 - 8.0 mg/L Final   06/14/2021 <2.9 0.0 - 8.0 mg/L Final   06/09/2021 <2.9 0.0 - 8.0 mg/L Final   03/25/2021 9.5 (H) 0.0 - 8.0 mg/L Final   03/22/2021 38.0 (H) 0.0 - 8.0 mg/L Final     AST   Date Value Ref Range Status   07/06/2023 17 0 - 45 U/L Final     Comment:     Reference intervals for this test were updated on 6/12/2023 to more accurately reflect our healthy population. There may be differences in the flagging of prior results with similar values performed with this method. Interpretation of those prior results can be made in the context of the updated reference intervals.   07/04/2023 24 0 - 45 U/L Final     Comment:     Reference intervals for this test were updated on 6/12/2023 to more accurately reflect our healthy population. There may be differences in the flagging of prior results with similar values performed with this method. Interpretation of those prior results can be made in the context of the updated reference intervals.   06/04/2023 28 10 - 50 U/L Final   05/28/2023 19 10 - 50 U/L Final   05/09/2023 17 10 - 50 U/L Final    06/29/2021 17 0 - 45 U/L Final   06/14/2021 18 0 - 45 U/L Final   06/09/2021 21 0 - 45 U/L Final   06/01/2021 15 0 - 45 U/L Final   05/25/2021 14 0 - 45 U/L Final     ALT   Date Value Ref Range Status   07/06/2023 14 0 - 70 U/L Final     Comment:     Reference intervals for this test were updated on 6/12/2023 to more accurately reflect our healthy population. There may be differences in the flagging of prior results with similar values performed with this method. Interpretation of those prior results can be made in the context of the updated reference intervals.     07/04/2023 17 0 - 70 U/L Final     Comment:     Reference intervals for this test were updated on 6/12/2023 to more accurately reflect our healthy population. There may be differences in the flagging of prior results with similar values performed with this method. Interpretation of those prior results can be made in the context of the updated reference intervals.     06/04/2023 25 10 - 50 U/L Final   05/28/2023 20 10 - 50 U/L Final   05/09/2023 31 10 - 50 U/L Final   06/29/2021 24 0 - 70 U/L Final   06/14/2021 23 0 - 70 U/L Final   06/09/2021 40 0 - 70 U/L Final   06/01/2021 23 0 - 70 U/L Final   05/25/2021 26 0 - 70 U/L Final     Bilirubin Total   Date Value Ref Range Status   07/06/2023 0.5 <=1.2 mg/dL Final   07/04/2023 0.7 <=1.2 mg/dL Final   06/04/2023 0.3 <=1.2 mg/dL Final   05/28/2023 0.4 <=1.2 mg/dL Final   05/09/2023 0.3 <=1.2 mg/dL Final   06/29/2021 0.7 0.2 - 1.3 mg/dL Final   06/14/2021 0.5 0.2 - 1.3 mg/dL Final   06/09/2021 0.5 0.2 - 1.3 mg/dL Final   06/01/2021 0.7 0.2 - 1.3 mg/dL Final   05/25/2021 0.3 0.2 - 1.3 mg/dL Final     Lab Results   Component Value Date     07/06/2023    BUN 17.1 07/06/2023    CO2 24 07/06/2023         Imaging:      XR hand left (7/4/23)  IMPRESSION: Soft tissue swelling in the metacarpal region, wrist and distal forearm. No soft tissue emphysema. No fractures are evident. Degenerative changes in the first  CMC joint.     XR forearm left (7/4/23)  IMPRESSION: Soft tissue swelling in the forearm has increased. No soft tissue fractures.     US upper extremity (7/3/23)  IMPRESSION:   1.  Deep venous thrombosis in left subclavian vein.  2.  Deep venous thrombosis in right internal jugular vein.     Eucrisa Counseling: Patient may experience a mild burning sensation during topical application. Eucrisa is not approved in children less than 3 months of age.

## 2023-07-06 NOTE — PROGRESS NOTES
Patient has been educated on potential risks of choosing to leave the unit and that the responsibility for patient well-being will belong to the patient. Pt has been informed that admission to hospital is due to need for medical treatment. Education given to the patient on some of the potential risks included but are not limited to:      - lack of access to nursing intervention      - possible missed appointments with MD, therapies, tests      - possible missed medications, antibiotics, management of IV's     Patient Response: Acceptance     Patient notified staff prior to leaving unit: Yes  Coban wrap placed over IV prior to pt leaving unit: Yes      Patient has PPN and IV abx infusing at this time. Educated pt that he cannot leave the unit while he is connected to a IV pump. Pt insisted that he is going outside and that he will bring the pump with him if he is not disconnected. Pt also stated that he will disconnect himself from the IV infusions. Writer disconnected pt from IV infusions and wrapped PIV in coban.    Patient was told that transport is coming to get him very soon. Pt insisted that he is going outside before his RONEL. Writer educated pt that if he is not on the unit when transport comes they will not wait for him. Pt verbalized understanding and went off the unit.

## 2023-07-06 NOTE — PROVIDER NOTIFICATION
Ronen Garrison MD notified    Lab was trying to draw labs and pt ran out of the room and down the stairs. Came back to room after 5 minutes. Security notified.

## 2023-07-06 NOTE — PROVIDER NOTIFICATION
"Provider Notification     Rush Monteiro (#[id number]) paged at [id number]:   \"[id number] NUZHAT LAWS pt left unit this morning to go outside. Educated on importance of remaining connected to IV pump for PPN and IV abx. Pt aware that RONEL is this AM and that if he is not on unit he will miss it.     ThanksDiane\"   "

## 2023-07-06 NOTE — CONSULTS
Care Management Initial Consult    General Information  Assessment completed with: Patient,    Type of CM/SW Visit: Initial Assessment    Primary Care Provider verified and updated as needed: Yes   Readmission within the last 30 days: no previous admission in last 30 days      Reason for Consult: discharge planning  Advance Care Planning: Advance Care Planning Reviewed: no concerns identified          Communication Assessment  Patient's communication style: spoken language (English or Bilingual)    Hearing Difficulty or Deaf: no   Wear Glasses or Blind: no    Cognitive  Cognitive/Neuro/Behavioral: .WDL except  Level of Consciousness: alert  Arousal Level: opens eyes spontaneously  Orientation: oriented x 4  Mood/Behavior: hyperactive (agitated, impulsive)  Best Language: 0 - No aphasia  Speech: rambling, clear, spontaneous    Living Environment:   People in home: spouse, child(francheska), dependent     Current living Arrangements: house      Able to return to prior arrangements: yes       Family/Social Support:  Care provided by: self, spouse/significant other, child(francheska), homecare agency  Provides care for: no one  Marital Status:              Description of Support System: Supportive, Involved         Current Resources:   Patient receiving home care services:       Community Resources:    Equipment currently used at home: shower chair, grab bar, tub/shower, grab bar, toilet, other (see comments) (Pt identifies having stair lift.)  Supplies currently used at home:      Employment/Financial:  Employment Status: disabled        Financial Concerns:        Finance Comments: Pt declined Financial Counselor Referral to explore MA elilgibility.    Does the patient's insurance plan have a 3 day qualifying hospital stay waiver?  Yes   Will the waiver be used for post-acute placement? Yes    Lifestyle & Psychosocial Needs:  Social Determinants of Health     Tobacco Use: High Risk (6/20/2023)    Patient History      Smoking  Tobacco Use: Light Smoker      Smokeless Tobacco Use: Never      Passive Exposure: Not on file   Alcohol Use: Not At Risk (8/4/2020)    AUDIT-C      Frequency of Alcohol Consumption: Never      Average Number of Drinks: Patient refused      Frequency of Binge Drinking: Never   Financial Resource Strain: Medium Risk (5/20/2022)    Overall Financial Resource Strain (CARDIA)      Difficulty of Paying Living Expenses: Somewhat hard   Food Insecurity: No Food Insecurity (5/20/2022)    Hunger Vital Sign      Worried About Running Out of Food in the Last Year: Never true      Ran Out of Food in the Last Year: Never true   Transportation Needs: No Transportation Needs (5/20/2022)    PRAPARE - Transportation      Lack of Transportation (Medical): No      Lack of Transportation (Non-Medical): No   Physical Activity: Unknown (8/4/2020)    Exercise Vital Sign      Days of Exercise per Week: 2 days      Minutes of Exercise per Session: Not on file   Stress: No Stress Concern Present (8/4/2020)    [place] [employer] - Occupational Stress Questionnaire      Feeling of Stress : Only a little   Social Connections: Socially Isolated (8/4/2020)    Social Connection and Isolation Panel [NHANES]      Frequency of Communication with Friends and Family: Once a week      Frequency of Social Gatherings with Friends and Family: Never      Attends Scientologist Services: Never      Active Member of Clubs or Organizations: No      Attends Club or Organization Meetings: Never      Marital Status:    Intimate Partner Violence: Not At Risk (8/4/2020)    Humiliation, Afraid, Rape, and Kick questionnaire      Fear of Current or Ex-Partner: No      Emotionally Abused: No      Physically Abused: No      Sexually Abused: No   Depression: Not at risk (2/8/2023)    PHQ-2      PHQ-2 Score: 0   Housing Stability: Low Risk  (8/4/2020)    Housing Stability Vital Sign      Unable to Pay for Housing in the Last Year: No      Number of  Places Lived in the Last Year: 1      Unstable Housing in the Last Year: No       Functional Status:  Prior to admission patient needed assistance:   Dependent ADLs:: Independent (Pt identifies that he is independnet.)  Dependent IADLs:: Independent (Pt identifies that he is independnet.)       Mental Health Status:  Mental Health Status: No Current Concerns       Chemical Dependency Status:  Chemical Dependency Status: No Current Concerns             Values/Beliefs:  Spiritual, Cultural Beliefs, Alevism Practices, Values that affect care:                 Additional Information:  SW met with Pt to complete initial care management assessment for high risk score. SW identified that current discharge disposition recs were for SNF to assist with PICC cares. Pt declined this and identified that he can take care of this at home with assistance from his wife, that his home health nurse will be re-starting services once he is home and denied declining home health agency staff admission to his home.     Pt identifies that he lives in a home with his wife and son. Pt identified using grab bar, shower chair and lift chair at home. Pt declined Financial Counselor referral to assess if he would qualify for MA.     Pt appeared distracted and may not have been in a place to be the most accurate historian. It may help to re-assess discharge planing supports with both Pt and Pt's spouse at a latter time.     Care management will continue to follow for safe discharge planning.   ________________    TOMY Martin, LICSW  6D   [facility]  Phone: [phone]  Pager: [phone]  Fax: [phone]

## 2023-07-06 NOTE — PLAN OF CARE
"[id number]    A&Ox4. Hypertensive within parameters. OVSS on RA. Pt behavior is erratic, odd, inappropriate to situation, & intermittently rambles to himself. Refused  Vitals x2. Received oxycodone liquid x2 for abdominal pain. Patient went outside x4. Started on peripheral parenteral nutrition, insisted nurse stop it multiple times to go outside. Refused blood sugar checks for PPN at midnight, 4am check 105. Patient unhooked himself from PPN to go outside without alerting nurse. MD aware, bag thrown out for contamination. Patient educated multiple times. Patient was noncompliant with NPO and obtained a popsicle and water despite education. MD aware.  Patient was found to be going down the back stairs x2. IV infiltrated and replaced x3 this shift, patient states he \"keeps bumping his arm on things\". Direct statements also include \"One more IV then I'm out of here\" and \"don't try to put me down as AMA, I will ramandeep.\" Vancomycin and PPN is currently running, pt was premedicated with PO benadryl. Was also found to be crawling on all 4s at end of shift.   Dr. Duron has been updated continuously throughout shift through [facility]. Continue w/POC.   "

## 2023-07-06 NOTE — CONSULTS
"[facility]   Consult Note - Addiction Service     Date of Admission:  7/4/2023    Consult Requested by: Dr. Gonzalez  Reason for Consult: Concern for high suspicion of drug use after syringe found in the room.    Assessment & Plan   Parker Acevedo is a 52 year old male admitted on 7/4/2023 for left subclavian DVT, possible concurrent cellulitis, and a recent untreated MRSE bacteremia - all most likely caused by his exisitng PICC line . He has a history of catheter-associated infections and venous occlusions. Addiction team was consulted today for highly suspicious behavior with syringe found in his room.    # Opioid Use Disorder  Patient mentioned that he uses the drugs prescribed and have not gone overboard with his prescribed medications. He also stated that he doesn't use other illicit street drugs. He mentioned that his pain is stable with current doses. The only drugs that he has done in the past was pots. He is bewildered with the accusation. He said that he uses the syringe for his Lovenox. He has not used any other drugs or increased his usual drugs dose in the system. He said that he is open to drug testing but at the same time, mentioned that he wants to first clear with his  and find grounds for suing for accusation. He said \" I feel accused and I will first talk with my  and my family : sister before doing anything.\" He mentioned that the dosage for his pain are accurate and he does not want any changes. He says that his medication are optimum and does not want any changes. does not want alternate medications. Mentioned that he has tried different pain medication in the past and does not want any changes right now.  Also mentioned that he had done urine drug screening recently and can allow me to access for it.   Again on talking next time, he said that he is willing to do drug screening test. He did not mention about  at that time. "   We do not think he is using any illicit drugs at the moment with his history.   - would recommend drug screen- patient is open to it now    If opioid withdrawal symptoms, consider:  --Clonidine:  0.1 to 0.3 mg every 6 to 8 hours (maximum: 1.2 mg/day)  --Gabapentin, 100 mg: Take 1-2 capsules (100-200 mg) by mouth 3 times daily  --Zofran 4 MG disintegrating tablet, take 1-2 tablets (4-8 mg) by mouth every 8 hours as needed for nausea   --Atarax 25 MG tablet, Take 1-2 tablets (25-50 mg) by mouth every 4 hours as needed for anxiety    If constipation, consider  --Constipation: miralax, senna     # Mental health:  Consider counseller for stress relieve.    # Harm Reduction:  On asking for his pain management. Mentioned that he is on adequate dose of pain meds and does not need to increase drug dose for pain or any other. He is not open to any changes now and denies street iv drug use now.    # Immunization review:   Consider Hep A IgG and Hep B serologies  Recent hep B done last year in 7/2022     The patient's care was discussed with the Attending Physician, Dr. Oliver.    I spent 120 minutes on the unit/floor managing the care of Parker Acevedo. Over 50% of my time was spent on the following:   Significant education and counseling spent on: how substance use disorders and dependence occur, and how it can become a chronic relapsing and remitting medical condition.  In addition, the pharmacology of medical treatments including oxycodone and fentanyl, the importance of follow up, and Harm Reduction advice on how to use substances in a less harmful way why trying to cut down were discussed today.      Batsheva Awad MD  [facility]   Contact information available via [facility] Paging/Directory  Please see sign in/sign out for up to date coverage information    [facility]AT team (Addiction Consult Team): Coverage Monday-Friday 8-4pm      ______________________________________________________________________    Chief Complaint   Concern for illicit drug use.     History is obtained from the patient    History of Present Illness   Parker Acevedo is a 52 year old male admitted on 7/4/2023 for left subclavian DVT, possible concurrent cellulitis, and a recent untreated MRSE bacteremia - all most likely caused by his exisitng PICC line . He has a history of catheter-associated infections and venous occlusions. Addiction team was consulted today for highly suspicious behavior with syringe found in his room.    Drug/Substance of Choice:  Oxycodone and fentanyl for pain for about 10 years.     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     Complication of anesthesia      Difficulty swallowing      Gastric ulcer, unspecified as acute or chronic, without mention of hemorrhage, perforation, or obstruction      Gastro-oesophageal reflux disease      Head injury      Hiatal hernia      Other bladder disorder      Other chronic pain      PONV (postoperative nausea and vomiting)      Severe malnutrition (H)     TPN     Short gut syndrome      Tobacco abuse        Past Surgical History:   Procedure Laterality Date     AMPUTATION       APPENDECTOMY       BACK SURGERY  11/3/2014    curve in the spine     BIOPSY LYMPH NODE  CERVICAL N/A 2/20/2015    Procedure: BIOPSY LYMPH NODE CERVICAL;  Surgeon: Baron Scanlon MD;  [facility]     CHOLECYSTECTOMY       COLONOSCOPY N/A 7/14/2021    Procedure: COLONOSCOPY;  Surgeon: Jimbo Estrada MD;  Location: [facility] OR     COLONOSCOPY N/A 4/13/2022    Procedure: COLONOSCOPY;  Surgeon: Jimbo Estrada MD;  Location: [facility] OR     DISCECTOMY, FUSION CERVICAL ANTERIOR ONE LEVEL, COMBINED N/A 2/15/2017    Procedure: COMBINED DISCECTOMY, FUSION CERVICAL ANTERIOR ONE LEVEL;  Surgeon: Darren Campos MD;  [facility]     ENDOSCOPIC INSERTION TUBE GASTROSTOMY  9/9/2013    Procedure: ENDOSCOPIC INSERTION TUBE GASTROSTOMY;;  Surgeon: Francis Vyas MD;  [facility]     ENDOSCOPIC ULTRASOUND UPPER GASTROINTESTINAL TRACT (GI)  4/29/2011    Procedure:ENDOSCOPIC ULTRASOUND UPPER GASTROINTESTINAL TRACT (GI); Both Procedures done Conjointly; Surgeon:NEREIDA HOUSER; [facility]     ENDOSCOPIC ULTRASOUND UPPER GASTROINTESTINAL TRACT (GI)  9/9/2013    Procedure: ENDOSCOPIC ULTRASOUND UPPER GASTROINTESTINAL TRACT (GI);  Endoscopic Ultrasound Guide Gastrostomy Tube Placement  C-arm;  Surgeon: Noe Lizarraga MD;  [facility]     ENDOSCOPIC ULTRASOUND UPPER GASTROINTESTINAL TRACT (GI) N/A 2/24/2021    Procedure: ENDOSCOPIC ULTRASOUND, ESOPHAGOSCOPY / UPPER GASTROINTESTINAL TRACT (GI), esophagastrogastroduodenoscopy;  Surgeon: Berny Bach MD;  [facility]     ENDOSCOPY  03/25/11    EGD, [facility]     ENDOSCOPY  08/04/09    Upper Endoscopy, [facility]     ENDOSCOPY  01/05/09    Upper Endoscopy, [facility]     ESOPHAGOSCOPY, GASTROSCOPY, DUODENOSCOPY (EGD), COMBINED  4/20/2011    Procedure:COMBINED ESOPHAGOSCOPY, GASTROSCOPY, DUODENOSCOPY (EGD); Surgeon:FRANCIS VYAS; [facility]     ESOPHAGOSCOPY, GASTROSCOPY, DUODENOSCOPY (EGD), COMBINED  6/15/2011    Procedure:COMBINED ESOPHAGOSCOPY, GASTROSCOPY, DUODENOSCOPY  (EGD); Surgeon:FRANCIS VYAS; [facility]     ESOPHAGOSCOPY, GASTROSCOPY, DUODENOSCOPY (EGD), COMBINED  6/12/2013    Procedure: COMBINED ESOPHAGOSCOPY, GASTROSCOPY, DUODENOSCOPY (EGD);;  Surgeon: Francis Vyas MD;  [facility]     ESOPHAGOSCOPY, GASTROSCOPY, DUODENOSCOPY (EGD), COMBINED  11/22/2013    Procedure: COMBINED ESOPHAGOSCOPY, GASTROSCOPY, DUODENOSCOPY (EGD);;  Surgeon: Francis Vyas MD;  [facility]     ESOPHAGOSCOPY, GASTROSCOPY, DUODENOSCOPY (EGD), COMBINED  4/30/2014    Procedure: COMBINED ESOPHAGOSCOPY, GASTROSCOPY, DUODENOSCOPY (EGD);  Surgeon: Francis Vyas MD;  Location:  GI     ESOPHAGOSCOPY, GASTROSCOPY, DUODENOSCOPY (EGD), COMBINED N/A 2/20/2015    Procedure: COMBINED ESOPHAGOSCOPY, GASTROSCOPY, DUODENOSCOPY (EGD), BIOPSY SINGLE OR MULTIPLE;  Surgeon: Baron Scanlon MD;  Location:  OR     ESOPHAGOSCOPY, GASTROSCOPY, DUODENOSCOPY (EGD), COMBINED N/A 9/30/2015    Procedure: COMBINED ESOPHAGOSCOPY, GASTROSCOPY, DUODENOSCOPY (EGD);  Surgeon: Francis Vyas MD;  Location:  GI     ESOPHAGOSCOPY, GASTROSCOPY, DUODENOSCOPY (EGD), COMBINED N/A 10/3/2019    Procedure: Upper Endoscopy;  Surgeon: Clif Morrow MD;  [facility]     GASTRECTOMY  6/22/2012    Procedure: GASTRECTOMY;  Open Approach, Excise Ulcers,Partial Gastrectomy, Esophagojejunostomy, Hiatal Hernia Repair, Extensive Lysis of Adhesions and Esaphagogastrodudenoscopy.;  Surgeon: Francis Vyas MD;  [facility]     GASTROJEJUNOSTOMY  08/26/09    Extensice enterolysis, partial resect. jejunum, part. resect gastric pouch, gastrojejunostomy anastomosis     HC ESOPH/GAS REFLUX TEST W NASAL IMPED ELECTRODE  8/5/2013    Procedure: ESOPHAGEAL IMPEDENCE FUNCTION TEST 1 HOUR OR LESS;  Surgeon: Halie Lang MD;  Location:  GI     HEAD & NECK SURGERY  2/15/2017    C5-C6     HERNIA REPAIR  2006    Umbilical hernia     HERNIORRHAPHY HIATAL  6/22/2012    Procedure:  HERNIORRHAPHY HIATAL;;  Surgeon: Francis Vyas MD;  [facility]     HERNIORRHAPHY INGUINAL  11/22/2013    Procedure: HERNIORRHAPHY INGUINAL;;  Surgeon: Francis Vyas MD;  [facility]     INSERT PICC LINE Right 12/19/2019    Procedure: Picc Placement;  Surgeon: Per Dumont PA-C;  [facility]     INSERT PICC LINE Right 2/21/2020    Procedure: INSERTION, PICC;  Surgeon: Per Dumont PA-C;  [facility]     INSERT PORT VASCULAR ACCESS Right 12/19/2017    Procedure: INSERT PORT VASCULAR ACCESS;  Right Chest Port Placement ;  Surgeon: Lisandro Alejandro PA-C;  [facility]     INSERT PORT VASCULAR ACCESS Right 8/2/2018    Procedure: INSERT PORT VASCULAR ACCESS;  Place single lumen tunneled central venous access catheter;  Surgeon: Guy Jamil PA-C;  [facility]     IR CVC TUNNEL PLACEMENT > 5 YRS OF AGE  8/7/2019     IR CVC TUNNEL PLACEMENT > 5 YRS OF AGE  4/14/2020     IR CVC TUNNEL PLACEMENT > 5 YRS OF AGE  8/3/2020     IR CVC TUNNEL PLACEMENT > 5 YRS OF AGE  9/4/2020     IR CVC TUNNEL PLACEMENT > 5 YRS OF AGE  2/5/2021     IR CVC TUNNEL PLACEMENT > 5 YRS OF AGE  3/23/2021     IR CVC TUNNEL PLACEMENT > 5 YRS OF AGE  1/13/2022     IR CVC TUNNEL PLACEMENT > 5 YRS OF AGE  5/12/2022     IR CVC TUNNEL PLACEMENT > 5 YRS OF AGE  7/13/2022     IR CVC TUNNEL REMOVAL LEFT  6/7/2023     IR CVC TUNNEL REMOVAL RIGHT  10/1/2019     IR CVC TUNNEL REMOVAL RIGHT  7/30/2020     IR CVC TUNNEL REMOVAL RIGHT  9/2/2020     IR CVC TUNNEL REMOVAL RIGHT  2/3/2021     IR CVC TUNNEL REMOVAL RIGHT  3/19/2021     IR CVC TUNNEL REMOVAL RIGHT  1/10/2022     IR CVC TUNNEL REMOVAL RIGHT  5/9/2022     IR CVC TUNNEL REMOVAL RIGHT  7/8/2022     IR CVC TUNNEL REVISION LEFT  8/10/2022     IR CVC TUNNEL REVISION RIGHT  5/7/2021     IR FOLLOW UP VISIT OUTPATIENT  8/7/2019     IR PICC EXCHANGE LEFT  6/8/2023     IR PICC PLACEMENT > 5 YRS OF AGE  3/7/2019     IR PICC PLACEMENT > 5 YRS OF AGE   12/19/2019     IR PICC PLACEMENT > 5 YRS OF AGE  2/21/2020     IR PICC PLACEMENT > 5 YRS OF AGE  6/7/2023     LAPAROTOMY EXPLORATORY  11/22/2013    Procedure: LAPAROTOMY EXPLORATORY;  Exploratory Laparotomy, Upper Endoscopy, Left Inguinal Hernia Repair;  Surgeon: Francis Vyas MD;  [facility]     ORTHOPEDIC SURGERY       PICC INSERTION Right 03/16/2017    5fr DL BioFlo PICC, 42cm (3cm external) in the R medial brachial vein w/ tip in the SVC RA junction.     PICC INSERTION Left 09/23/2017    5fr DL BioFlo PICC, 45cm (1cm external) in the L basilic vein w/ tip in the SVC RA junction.     PICC INSERTION Right 05/16/2019    5Fr - 43cm, Medial brachial vein, low SVC     PICC INSERTION Right 10/02/2019    5Fr - 43cm (2cm external), basilic vein, low SVC     SHAYLEE EN Y BOWEL  2003     SOFT TISSUE SURGERY       THORACIC SURGERY       TONSILLECTOMY       TRANSESOPHAGEAL ECHOCARDIOGRAM INTRAOPERATIVE N/A 1/8/2019    Procedure: TRANSESOPHAGEAL ECHOCARDIOGRAM INTRAOPERATIVE;  Surgeon: GENERIC ANESTHESIA PROVIDER;  [facility]     ZZC GASTRIC BYPASS,OBESE<100CM SHAYLEE-EN-Y  2002    lost 300 pounds       Social History   Social History     Socioeconomic History     Marital status:      Spouse name: Rose     Number of children: 1     Years of education: Not on file     Highest education level: GED or equivalent   Occupational History     Not on file   Tobacco Use     Smoking status: Light Smoker     Packs/day: 0.50     Years: 3.00     Pack years: 1.50     Types: Cigarettes     Smokeless tobacco: Never     Tobacco comments:     2/4/2021    smokes 3 cigarettes/day   Vaping Use     Vaping Use: Never used   Substance and Sexual Activity     Alcohol use: No     Comment: quit 2002     Drug use: No     Sexual activity: Yes     Partners: Female     Birth control/protection: None     Comment: no protection   Other Topics Concern     Parent/sibling w/ CABG, MI or angioplasty before 65F 55M? No   Social History  Narrative     Not on file     Social Determinants of Health     Financial Resource Strain: Medium Risk (5/20/2022)    Overall Financial Resource Strain (CARDIA)      Difficulty of Paying Living Expenses: Somewhat hard   Food Insecurity: No Food Insecurity (5/20/2022)    Hunger Vital Sign      Worried About Running Out of Food in the Last Year: Never true      Ran Out of Food in the Last Year: Never true   Transportation Needs: No Transportation Needs (5/20/2022)    PRAPARE - Transportation      Lack of Transportation (Medical): No      Lack of Transportation (Non-Medical): No   Physical Activity: Unknown (8/4/2020)    Exercise Vital Sign      Days of Exercise per Week: 2 days      Minutes of Exercise per Session: Not on file   Stress: No Stress Concern Present (8/4/2020)    Citizen of [place] of Occupational Health - Occupational Stress Questionnaire      Feeling of Stress : Only a little   Social Connections: Socially Isolated (8/4/2020)    Social Connection and Isolation Panel [NHANES]      Frequency of Communication with Friends and Family: Once a week      Frequency of Social Gatherings with Friends and Family: Never      Attends Latter-day Services: Never      Active Member of Clubs or Organizations: No      Attends Club or Organization Meetings: Never      Marital Status:    Intimate Partner Violence: Not At Risk (8/4/2020)    Humiliation, Afraid, Rape, and Kick questionnaire      Fear of Current or Ex-Partner: No      Emotionally Abused: No      Physically Abused: No      Sexually Abused: No   Housing Stability: Low Risk  (8/4/2020)    Housing Stability Vital Sign      Unable to Pay for Housing in the Last Year: No      Number of Places Lived in the Last Year: 1      Unstable Housing in the Last Year: No       Family History   I have reviewed this patient's family history and updated it with pertinent information if needed.  Family History   Problem Relation Age of Onset     Gastrointestinal Disease  Mother         Crohns disease     Anxiety Disorder Mother      Thyroid Disease Mother         Grave's disease     Cancer Father         ear cancer-skin cancer/melanoma     Breast Cancer Maternal Grandmother      Macular Degeneration Maternal Grandfather      Anxiety Disorder Sister      Diabetes Maternal Uncle      Breast Cancer Other      Hypertension No family hx of      Hyperlipidemia No family hx of      Cerebrovascular Disease No family hx of      Prostate Cancer No family hx of      Depression No family hx of      Anesthesia Reaction No family hx of      Asthma No family hx of      Osteoporosis No family hx of      Genetic Disorder No family hx of      Obesity No family hx of      Mental Illness No family hx of      Substance Abuse No family hx of      Glaucoma No family hx of          Medications   I have reviewed this patient's current medications    Allergies   No Known Allergies    Physical Exam   Temp: 98.3  F (36.8  C) Temp src: Oral BP: 126/83 Pulse: 82   Resp: 18 SpO2: 97 % O2 Device: None (Room air)       Constitutional: awake  Eyes: vision intact  ENT: atramatic  Hematologic / Lymphatic: no cervical lymphadenopathy and no supraclavicular lymphadenopathy  Respiratory: Clear to auscultation BL  Cardiovascular: Normal S1/S2 with no murmurs, rub, or gallops   GI: Non-distended, normal bowel sounds, non-tender  Skin: no bruising or bleeding and normal skin color, texture, turgor  Musculoskeletal: LUE edema from forearm distally with slight erythema and tenderness on palpation.   Neurologic: Awake, alert, oriented to name, place and time.       Due to regulation of Title 42 of the Code of Federal Regulations (CFR) Part 2: Confidentiality laws apply to this note and the information wherein.  Thus, this note cannot be copy and pasted into any other health care staff's note nor can it be included in general medical records sent to ANY outside agency without the patient's written consent.\      Internal  Medicine Staff Addendum  Date of Service: 7/6/2023    I have seen and examined this patient, reviewed the data and discussed the plan of care. I agree with the above documentation including plan and ddx unless otherwise stated:     # Pt is unhappy that he was searched. He denies any illicit drug use and is amenable to urine toxicology. I don't believe that he had any illicit use. I discussed with primary team and nursing supervisor who were concerned about his erratic behavior. I reviewed his PDMP and he has had regular visits, regular refills and no evidence of illicit use. No changes in doses of opiates or benzos recommended at this time.    Bakrai Oliver MD  Internal Medicine Hospitalist  [facility]  Attending pager: [phone]

## 2023-07-06 NOTE — PHARMACY-ADMISSION MEDICATION HISTORY
Pharmacist Admission Medication History    Admission medication history is complete. The information provided in this note is only as accurate as the sources available at the time of the update.    Medication reconciliation/reorder completed by provider prior to medication history? Yes    Information Source(s): Patient and CareEverywhere/SureScripts via in-person    Pertinent Information: Patient is unsure if he has epinephrine at home in case of allergic reaction. Recommend sending prescription on discharge if there is concern for a potential reaction to any discharge medication.     Changes made to PTA medication list:    Added: None    Deleted: None    Changed: None    Allergies reviewed with patient and updates made in EHR: yes    Medication History Completed By: Manjeet Muse PharmD 7/6/2023 3:13 PM    Prior to Admission medications    Medication Sig Last Dose Taking? Auth Provider Long Term End Date   albuterol (VENTOLIN HFA) 108 (90 Base) MCG/ACT inhaler Inhale 2 puffs into the lungs every 6 hours as needed Unknown Yes Chuy Isaac MD Yes    amphetamine-dextroamphetamine (ADDERALL) 20 MG tablet TAKE ONE TABLET BY MOUTH ONCE DAILY 7/5/2023 Yes Chuy Isaac MD No    carvedilol (COREG) 6.25 MG tablet TAKE 2 TABLETS (12.5 MG) BY MOUTH 2 TIMES DAILY WITH MEALS 7/5/2023 Yes Chuy Isaac MD Yes    cyanocobalamin (CYANOCOBALAMIN) 1000 MCG/ML injection INJECT 1 ML INTO THE MUSCLE EVERY 30 DAYS 7/5/2023 Yes Chuy Isaac MD Yes    enoxaparin ANTICOAGULANT (LOVENOX) 80 MG/0.8ML syringe INJECT THE CONTENTS OF ONE SYRINGE (80MG) UNDER THE SKIN TWO TIMES A DAY 7/5/2023 Yes Chuy Isaac MD     fentaNYL (DURAGESIC) 25 mcg/hr 72 hr patch Place 1 patch onto the skin every 48 hours Fill 06/30/23 and start 07/2/23. 30 day supply for chronic pain. 7/4/2023 Yes Natalie Hull APRN CNP     lidocaine (LIDODERM) 5 % patch Place 1-2 patches onto the skin every 24 hours Wear for 12 hours, remove for 12 hours.   "OK to cut to better fit to size. Past Week Yes Natalie Hull APRN CNP     LORazepam (ATIVAN) 1 MG tablet Take 1 tablet (1 mg) by mouth every evening TAKE ONE TABLET BY MOUTH ONCE DAILY AS NEEDED FOR ANXIETY WITH TPN AND MEDICATIONS 7/4/2023 Yes Chuy Isaac MD     naloxone (NARCAN) 4 MG/0.1ML nasal spray Spray 1 spray (4 mg) into one nostril alternating nostrils once as needed for opioid reversal every 2-3 minutes until assistance arrives Unknown Yes Natalie Hull APRN CNP Yes    nystatin (MYCOSTATIN) 740090 UNIT/GM external cream APPLY TOPICALLY 2 TIMES DAILY 7/4/2023 Yes Carlos Doherty,      ondansetron (ZOFRAN ODT) 8 MG ODT tab DISSOLVE ONE TABLET ON TONGUE EVERY 8 HOURS AS NEEDED FOR NAUSEA Past Month Yes Chuy Isaac MD No    oxyCODONE (ROXICODONE) 5 MG/5ML solution Take 5-10 mLs (5-10 mg) by mouth every 4 hours as needed for moderate to severe pain Max of 40mg/day. Fill 06/30/23 and start 07/2/23. 30 day supply for chronic pain. 7/4/2023 Yes Natalie Hull APRN CNP     pantoprazole (PROTONIX) 40 MG EC tablet Take 1 tablet (40 mg) by mouth daily 7/5/2023 Yes Chuy Isaac MD     polyethylene glycol (MIRALAX) 17 GM/Dose powder Take 17 g by mouth daily  Patient taking differently: Take 17 g by mouth daily as needed Past Month Yes Natalie Hull APRN CNP No    sucralfate (CARAFATE) 1 GM/10ML suspension TAKE 10MLS  BY MOUTH FOUR TIMES A DAY AS NEEDED 7/5/2023 Yes Chuy Isaac MD     clindamycin (CLEOCIN) 150 MG capsule Take 3 capsules (450 mg) by mouth 3 times daily for 7 days   Anastacio Schofield MD No 7/10/23   [facility] INFUSION MANAGED PATIENT Contact [facility] for patient specific medication information at [phone] on admission and discharge from the hospital.  Phones are answered 24 hours a day 7 days a week 365 days a year.    Providers - Choose \"CONTINUE HOME MED (no script)\" at discharge if patient treatment with home infusion will " "continue.   Ilsa Shine PA     Syringe/Needle, Disp, (B-D ECLIPSE SYRINGE) 27G X 1/2\" 1 ML MISC 1 Device every 30 days   Chuy Isaac MD     vitamin D2 (ERGOCALCIFEROL) 11799 units (1250 mcg) capsule Take 1 capsule (50,000 Units) by mouth once a week 6/30/2023  Chuy Isaac MD     NO ACTIVE MEDICATIONS .   Rupesh Rothman MD Yes 10/10/08       "

## 2023-07-06 NOTE — PROGRESS NOTES
Providers on unit. Security called to perform therapeutic room search. Patient care order was placed.

## 2023-07-06 NOTE — TELEPHONE ENCOUNTER
Nicki RN from [facility] calling in wanting to let provider know that patient has been hospitalized again with cellulitis.  Patient has been uncooperative at hospital and with home care.  If patient leaves [facility] home care will not open back up for services for patient. Routing to provider as FYI.    Any questions please call Nicki at [phone].

## 2023-07-06 NOTE — PLAN OF CARE
"[id number]    A&Ox4. Very restless and unable sit for long periods of time. VSS on RA. Afebrile. Continues to have pain, given prn oxycodone x2 with relief. Nausea managed with prn zofran x1. Denies SOB. Blood cultures collected this shift. Poor PO intake. Continuous PPN infusing. Voids without saving, reports good urine output. G tube in place, not being used. Room search done this shift, see previous notes. Addiction medicine saw pt this shift. UA still needed. Pt states, \"It is going to take me awhile, I don't go a lot\" and \"They should've asked me for this before 1030.\" [facility] unsuccessful this shift. Rescheduled for tomorrow in the OR at 1250. Pt will be NPO at midnight. Pt up with SBA. Pt refuses assistance and walks the unit/leaves the unit. Left the unit to go outside x2 this shift. Pt stated multiple times this shift that he will be leaving the hospital tomorrow and that he has told the team this since Monday. Pt states he will not be in the hospital over the weekend. Continue with plan of care.           "

## 2023-07-06 NOTE — PROVIDER NOTIFICATION
Henny 7D charge - talked to [facility] staff, RN there was going to talk to provider and assess if they should proceed. Thanks.

## 2023-07-06 NOTE — PROGRESS NOTES
Patient has been educated on potential risks of choosing to leave the unit and that the responsibility for patient well-being will belong to the patient. Pt has been informed that admission to hospital is due to need for medical treatment. Education given to the patient on some of the potential risks included but are not limited to:      - lack of access to nursing intervention      - possible missed appointments with MD, therapies, tests      - possible missed medications, antibiotics, management of IV's    Patient Response: Acceptance    Patient notified staff prior to leaving unit: Yes  Coban wrap placed over IV prior to pt leaving unit yes

## 2023-07-06 NOTE — PROGRESS NOTES
[facility]    Progress Note - Medicine Service, MAROON TEAM 5       Date of Admission:  7/4/2023    Assessment & Plan   Parker Acevedo is a 52 year old male admitted on 7/4/2023 for left subclavian DVT, possible concurrent cellulitis, and a recent untreated MRSE bacteremia - all most likely caused by his exisitng PICC line . He has a history of catheter-associated infections and venous occlusions.     Updates:  - ID recs appreciated   > TTE   > RONEL tomorrow. NPO at midnight   > Continue vanc  - Drug screen    Of Note: Pt overnight was reportedly exiting the building frequently and was found to have syringe by bedside marked at 10mL line near bed. There was a concern for drug use and nursing requested room search overnight that was deferred to the day team. Day team met with nursing to address concerns with patient and approach him with reasons for search and drug screen d/t his safety and safety of staff. Security arrived and entered room before team was able to inform pt of search and arrived while pt was being searched. Pt was very compliant and was open to doing drug screen. Pt was tearful and stated that he should not be treated this way and informed team that he is not doing any type of drug and has not misused medications. After consulting Addiction medicine, pt statement was further validated after their chart and PDMP review. Pt had some refusal of drug screen, but was informed of inability to discharge with IV access if screen not submitted. Pt was told of high mortality risk of worsening septic bacteremia and certain mortality risk of starvation without IV access.      #Possible LUE nonpurulent cellulitis  #Septic Thrombus Left Subclavian Vein, Likely  #Recent MRSE bacteremia  #Hx of Cm catheter-associated polymicrobial bacteremia (06/2023)  #Hx of fungemia  Blood cx's from [place] grew methicillin-resistant Staph Epidermidis. Believe these  "infections were also provoked by his existing PICC line and poor line care. C/f endocarditis d/t duration of bacteremia. Pt was informed at the bedside of mortality risk   - ID recs appreciated   > Continue vanc   > TTE and RONEL   > Blood cultures until cleared    #Left subclavian Septic Thrombus, Likely  #Hx of catheter-provoked R axillary vein DVT (02/2022)  Identified on US completed at [facility]. Believe this DVT was also provoked by his existing PICC line. No recent traumas or surgeries. No past medical history of any malignancies and patient is otherwise asymptomatic with relatively unremarkable blood work/recent imaging. Patient reports that he is compliant with his Lovenox at home.   His first DVT was identified on 02/23/2022 in his right subclavian and found to be associated with his Cm catheter.  -Restarted home Lovenox 80 mg SQ BID  -Consider IR consult see if patient can get a new Cm catheter reinserted prior to discharge  -Patient not a good candidate for Eliquis due to his poor GI absorption since getting gastric bypass (2003)     #Short gut syndrome  #Malabsorption requiring TPN  Complication of Celestino-en-Y gastric bypass surgery (2003)   -Resumed home Oxycodone & Fentanyl patch for pain control  -Pharm on consult for TPN management via PIV     Chronic/Stable Conditions     #Paroxysmal atrial fibrillation  Patient reports that he has a history of random \"palpitations\" that have been going for several decades.  HR < 100. Last EKG noted NSR  -Resumed home Coreg for rate control   -Resumed home Lovenox for AC     #Essential Hypertension  -Resumed home Coreg     #ADHD  -Resumed home adderall     #Macrocytic anemia  Likely from Vit B12 deficiency 2/2 poor GI absorption  -Receives monthly Cyanocobalamin 1000 mcg IM     #Anxiety disorder  -Resumed home ativan     #Tobacco use  -Started nicotine patches 7mg/24hr     Diet: parenteral nutrition - Clinimix E  NPO per Anesthesia Guidelines for " "Procedure/Surgery Except for: Meds  parenteral nutrition - Clinimix E    DVT Prophylaxis: DOAC  Sanchez Catheter: Not present  Fluids: PO  Lines: None     Cardiac Monitoring: None  Code Status: Full Code      Clinically Significant Risk Factors                         # Overweight: Estimated body mass index is 26.97 kg/m  as calculated from the following:    Height as of this encounter: 1.803 m (5' 11\").    Weight as of this encounter: 87.7 kg (193 lb 6.4 oz)., PRESENT ON ADMISSION          Disposition Plan      Expected Discharge Date: 07/08/2023      Destination: home with family          The patient's care was discussed with the Attending Physician, Dr. Shanae Gonzalez.    Rush Chairez MD  Medicine Service, [facility] TEAM 5  [facility]  Securely message with [employer] (more info)  Text page via [employer] Paging/Directory   See signed in provider for up to date coverage information  ______________________________________________________________________    Interval History   Overnight as stated above. No other complaints at the bedside.     Physical Exam   Vital Signs: Temp: 98.6  F (37  C) Temp src: Oral BP: 124/69 Pulse: 95   Resp: 16 SpO2: 97 % O2 Device: None (Room air)    Weight: 193 lbs 6.4 oz    Constitutional: awake  Eyes: vision intact  ENT: atramatic  Hematologic / Lymphatic: no cervical lymphadenopathy and no supraclavicular lymphadenopathy  Respiratory: Clear to auscultation BL  Cardiovascular: Normal S1/S2 with no murmurs, rub, or gallops   GI: Non-distended, normal bowel sounds, non-tender  Skin: no bruising or bleeding and normal skin color, texture, turgor  Musculoskeletal: LUE edema from forearm distally with slight erythema and tenderness on palpation.   Neurologic: Awake, alert, oriented to name, place and time.       Medical Decision Making       Please see A&P for additional details of medical decision making.      Data     I have personally reviewed the " following data over the past 24 hrs:    N/A  \   N/A   / N/A     140 106 17.1 /  103 (H)   3.7 24 0.76 \       ALT: 14 AST: 17 AP: 69 TBILI: 0.5   ALB: 3.9 TOT PROTEIN: 6.8 LIPASE: N/A       INR:  1.13 PTT:  N/A   D-dimer:  N/A Fibrinogen:  N/A       Imaging results reviewed over the past 24 hrs:   Recent Results (from the past 24 hour(s))   Echo Complete   Result Value    LVEF  60-65%    Narrative    [id number]  [id number]  [id number]  [id number]^HERILNDA^INO     [facility],[place]  Echocardiography Laboratory  [address] [id number]     Name: SARA HASSAN  MRN: [id number]  : 1970  Study Date: 2023 03:18 PM  Age: 52 yrs  Gender: Male  Patient Location: [facility]  Reason For Study: Endocarditis  Ordering Physician: INO PATE  Performed By: Astrid Portillo     BSA: 2.1 m2  Height: 71 in  Weight: 193 lb  ______________________________________________________________________________  Procedure  Complete Portable Echo Adult.  ______________________________________________________________________________  Interpretation Summary  Left ventricular size, wall motion and function are normal. The ejection  fraction is 60-65%.  Right ventricular function, chamber size, wall motion, and thickness are  normal.  IVC diameter and respiratory changes fall into an intermediate range  suggesting an RA pressure of 8 mmHg. No pericardial effusion is present.     There has been no change.  ______________________________________________________________________________  Left Ventricle  Left ventricular size, wall motion and function are normal. The ejection  fraction is 60-65%. Left ventricular diastolic function is indeterminate. No  regional wall motion abnormalities are seen.     Right Ventricle  Right ventricular function, chamber size, wall motion, and thickness are  normal.     Atria  Both atria appear normal.     Mitral Valve  The mitral valve is normal. Trace mitral  insufficiency is present.     Aortic Valve  Aortic valve is normal in structure and function. The aortic valve is  tricuspid. No aortic regurgitation is present.     Tricuspid Valve  The tricuspid valve is normal. Trace tricuspid insufficiency is present. The  peak velocity of the tricuspid regurgitant jet is not obtainable.     Pulmonic Valve  The pulmonic valve is normal.     Vessels  The aorta root is normal. Dilation of the inferior vena cava is present with  normal respiratory variation in diameter. IVC diameter and respiratory changes  fall into an intermediate range suggesting an RA pressure of 8 mmHg.     Pericardium  No pericardial effusion is present.     Compared to Previous Study  There has been no change.  ______________________________________________________________________________  MMode/2D Measurements & Calculations  IVSd: 0.84 cm  LVIDd: 4.9 cm  LVIDs: 3.4 cm  LVPWd: 0.98 cm  FS: 30.5 %  LV mass(C)d: 154.7 grams  LV mass(C)dI: 74.5 grams/m2  Ao root diam: 3.6 cm  asc Aorta Diam: 3.8 cm  LVOT diam: 2.6 cm  LVOT area: 5.3 cm2  LA Volume (BP): 57.4 ml     LA Volume Index (BP): 27.6 ml/m2  RWT: 0.40     Doppler Measurements & Calculations  MV E max david: 55.9 cm/sec  MV A max david: 73.9 cm/sec  MV E/A: 0.76  MV dec time: 0.20 sec  E/E' avg: 3.7  Lateral E/e': 2.8  Medial E/e': 4.5  RV S David: 12.9 cm/sec     ______________________________________________________________________________  Report approved by: Sudhir TRAVIS 07/06/2023 03:57 PM

## 2023-07-06 NOTE — TELEPHONE ENCOUNTER
Nicki with [facility] home health calling to let Dr Vyas know that patient is in-patient & is uncooperative with hospital staff. If patient leaves AMA then they won't be able to see him for home health any longer & will not re-open his case.    [phone]

## 2023-07-06 NOTE — ANESTHESIA PREPROCEDURE EVALUATION
Anesthesia Pre-Procedure Evaluation    Patient: Parker Acevedo   MRN: [id number] : 1970        Procedure : Procedure(s):  Transesophageal echocardiogram in the OR          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)     mild EF ~45% on rest 17, improves with stressing     Chronic abdominal pain      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     Complication of anesthesia      Difficulty swallowing      Gastric ulcer, unspecified as acute or chronic, without mention of hemorrhage, perforation, or obstruction      Gastro-oesophageal reflux disease      Head injury      Hiatal hernia      Other bladder disorder      Other chronic pain      PONV (postoperative nausea and vomiting)      Severe malnutrition (H)     TPN     Short gut syndrome      Tobacco abuse       Past Surgical History:   Procedure Laterality Date     AMPUTATION       APPENDECTOMY       BACK SURGERY  11/3/2014    curve in the spine     BIOPSY LYMPH NODE CERVICAL N/A 2015    Procedure: BIOPSY LYMPH NODE CERVICAL;  Surgeon: Baron Scanlon MD;  [facility]     CHOLECYSTECTOMY       COLONOSCOPY N/A 2021    Procedure: COLONOSCOPY;  Surgeon: Jimbo Estrada MD;  Location: [facility] OR     COLONOSCOPY N/A 2022    Procedure: COLONOSCOPY;  Surgeon: Jimbo Estrada MD;  Location: [facility] OR     DISCECTOMY, FUSION CERVICAL ANTERIOR ONE LEVEL, COMBINED N/A 2/15/2017    Procedure: COMBINED DISCECTOMY, FUSION CERVICAL ANTERIOR ONE LEVEL;  Surgeon: Darren Campos MD;  [facility]     ENDOSCOPIC INSERTION TUBE GASTROSTOMY  2013    Procedure: ENDOSCOPIC INSERTION TUBE GASTROSTOMY;;  Surgeon: Francis Vyas MD;  [facility]     ENDOSCOPIC ULTRASOUND UPPER GASTROINTESTINAL TRACT (GI)  2011    Procedure:ENDOSCOPIC ULTRASOUND UPPER GASTROINTESTINAL TRACT (GI);  Both Procedures done Conjointly; Surgeon:NEREIDA HOUSER; [facility]     ENDOSCOPIC ULTRASOUND UPPER GASTROINTESTINAL TRACT (GI)  9/9/2013    Procedure: ENDOSCOPIC ULTRASOUND UPPER GASTROINTESTINAL TRACT (GI);  Endoscopic Ultrasound Guide Gastrostomy Tube Placement  C-arm;  Surgeon: Noe Lizarraga MD;  [facility]     ENDOSCOPIC ULTRASOUND UPPER GASTROINTESTINAL TRACT (GI) N/A 2/24/2021    Procedure: ENDOSCOPIC ULTRASOUND, ESOPHAGOSCOPY / UPPER GASTROINTESTINAL TRACT (GI), esophagastrogastroduodenoscopy;  Surgeon: Berny Bach MD;  [facility]: [facility]     ENDOSCOPY  03/25/11    EGD, [facility]     ENDOSCOPY  08/04/09    Upper Endoscopy, [facility]     ENDOSCOPY  01/05/09    Upper Endoscopy, [facility]     ESOPHAGOSCOPY, GASTROSCOPY, DUODENOSCOPY (EGD), COMBINED  4/20/2011    Procedure:COMBINED ESOPHAGOSCOPY, GASTROSCOPY, DUODENOSCOPY (EGD); Surgeon:BLU VYAS; [facility]     ESOPHAGOSCOPY, GASTROSCOPY, DUODENOSCOPY (EGD), COMBINED  6/15/2011    Procedure:COMBINED ESOPHAGOSCOPY, GASTROSCOPY, DUODENOSCOPY (EGD); Surgeon:BLU VYAS; [facility]     ESOPHAGOSCOPY, GASTROSCOPY, DUODENOSCOPY (EGD), COMBINED  6/12/2013    Procedure: COMBINED ESOPHAGOSCOPY, GASTROSCOPY, DUODENOSCOPY (EGD);;  Surgeon: Blu Vyas MD;  [facility]     ESOPHAGOSCOPY, GASTROSCOPY, DUODENOSCOPY (EGD), COMBINED  11/22/2013    Procedure: COMBINED ESOPHAGOSCOPY, GASTROSCOPY, DUODENOSCOPY (EGD);;  Surgeon: Blu Vyas MD;  [facility]     ESOPHAGOSCOPY, GASTROSCOPY, DUODENOSCOPY (EGD), COMBINED  4/30/2014    Procedure: COMBINED ESOPHAGOSCOPY, GASTROSCOPY, DUODENOSCOPY (EGD);  Surgeon: Blu Vyas MD;  [facility]     ESOPHAGOSCOPY, GASTROSCOPY, DUODENOSCOPY (EGD), COMBINED N/A 2/20/2015    Procedure: COMBINED ESOPHAGOSCOPY, GASTROSCOPY, DUODENOSCOPY (EGD), BIOPSY SINGLE OR MULTIPLE;  Surgeon: Baron Scanlon MD;  Location:  OR      ESOPHAGOSCOPY, GASTROSCOPY, DUODENOSCOPY (EGD), COMBINED N/A 9/30/2015    Procedure: COMBINED ESOPHAGOSCOPY, GASTROSCOPY, DUODENOSCOPY (EGD);  Surgeon: Francis Vyas MD;  Location:  GI     ESOPHAGOSCOPY, GASTROSCOPY, DUODENOSCOPY (EGD), COMBINED N/A 10/3/2019    Procedure: Upper Endoscopy;  Surgeon: Clif Morrow MD;  [facility]     GASTRECTOMY  6/22/2012    Procedure: GASTRECTOMY;  Open Approach, Excise Ulcers,Partial Gastrectomy, Esophagojejunostomy, Hiatal Hernia Repair, Extensive Lysis of Adhesions and Esaphagogastrodudenoscopy.;  Surgeon: Francis Vyas MD;  [facility]     GASTROJEJUNOSTOMY  08/26/09    Extensice enterolysis, partial resect. jejunum, part. resect gastric pouch, gastrojejunostomy anastomosis     HC ESOPH/GAS REFLUX TEST W NASAL IMPED ELECTRODE  8/5/2013    Procedure: ESOPHAGEAL IMPEDENCE FUNCTION TEST 1 HOUR OR LESS;  Surgeon: Halie Lang MD;  Location:  GI     HEAD & NECK SURGERY  2/15/2017    C5-C6     HERNIA REPAIR  2006    Umbilical hernia     HERNIORRHAPHY HIATAL  6/22/2012    Procedure: HERNIORRHAPHY HIATAL;;  Surgeon: Francis Vyas MD;  Location:  OR     HERNIORRHAPHY INGUINAL  11/22/2013    Procedure: HERNIORRHAPHY INGUINAL;;  Surgeon: Francis Vyas MD;  [facility]     INSERT PICC LINE Right 12/19/2019    Procedure: Picc Placement;  Surgeon: Per Dumont PA-C;  [facility]     INSERT PICC LINE Right 2/21/2020    Procedure: INSERTION, PICC;  Surgeon: Per Dumont PA-C;  [facility]     INSERT PORT VASCULAR ACCESS Right 12/19/2017    Procedure: INSERT PORT VASCULAR ACCESS;  Right Chest Port Placement ;  Surgeon: Lisandro Alejandro PA-C;  [facility]     INSERT PORT VASCULAR ACCESS Right 8/2/2018    Procedure: INSERT PORT VASCULAR ACCESS;  Place single lumen tunneled central venous access catheter;  Surgeon: Guy Jamil PA-C;  [facility]     IR CVC TUNNEL PLACEMENT > 5 YRS OF  AGE  8/7/2019     IR CVC TUNNEL PLACEMENT > 5 YRS OF AGE  4/14/2020     IR CVC TUNNEL PLACEMENT > 5 YRS OF AGE  8/3/2020     IR CVC TUNNEL PLACEMENT > 5 YRS OF AGE  9/4/2020     IR CVC TUNNEL PLACEMENT > 5 YRS OF AGE  2/5/2021     IR CVC TUNNEL PLACEMENT > 5 YRS OF AGE  3/23/2021     IR CVC TUNNEL PLACEMENT > 5 YRS OF AGE  1/13/2022     IR CVC TUNNEL PLACEMENT > 5 YRS OF AGE  5/12/2022     IR CVC TUNNEL PLACEMENT > 5 YRS OF AGE  7/13/2022     IR CVC TUNNEL REMOVAL LEFT  6/7/2023     IR CVC TUNNEL REMOVAL RIGHT  10/1/2019     IR CVC TUNNEL REMOVAL RIGHT  7/30/2020     IR CVC TUNNEL REMOVAL RIGHT  9/2/2020     IR CVC TUNNEL REMOVAL RIGHT  2/3/2021     IR CVC TUNNEL REMOVAL RIGHT  3/19/2021     IR CVC TUNNEL REMOVAL RIGHT  1/10/2022     IR CVC TUNNEL REMOVAL RIGHT  5/9/2022     IR CVC TUNNEL REMOVAL RIGHT  7/8/2022     IR CVC TUNNEL REVISION LEFT  8/10/2022     IR CVC TUNNEL REVISION RIGHT  5/7/2021     IR FOLLOW UP VISIT OUTPATIENT  8/7/2019     IR PICC EXCHANGE LEFT  6/8/2023     IR PICC PLACEMENT > 5 YRS OF AGE  3/7/2019     IR PICC PLACEMENT > 5 YRS OF AGE  12/19/2019     IR PICC PLACEMENT > 5 YRS OF AGE  2/21/2020     IR PICC PLACEMENT > 5 YRS OF AGE  6/7/2023     LAPAROTOMY EXPLORATORY  11/22/2013    Procedure: LAPAROTOMY EXPLORATORY;  Exploratory Laparotomy, Upper Endoscopy, Left Inguinal Hernia Repair;  Surgeon: Francis Vyas MD;  [facility]     ORTHOPEDIC SURGERY       PICC INSERTION Right 03/16/2017    5fr DL BioFlo PICC, 42cm (3cm external) in the R medial brachial vein w/ tip in the SVC RA junction.     PICC INSERTION Left 09/23/2017    5fr DL BioFlo PICC, 45cm (1cm external) in the L basilic vein w/ tip in the SVC RA junction.     PICC INSERTION Right 05/16/2019    5Fr - 43cm, Medial brachial vein, low SVC     PICC INSERTION Right 10/02/2019    5Fr - 43cm (2cm external), basilic vein, low SVC     SHAYLEE EN Y BOWEL  2003     SOFT TISSUE SURGERY       THORACIC SURGERY       TONSILLECTOMY        TRANSESOPHAGEAL ECHOCARDIOGRAM INTRAOPERATIVE N/A 1/8/2019    Procedure: TRANSESOPHAGEAL ECHOCARDIOGRAM INTRAOPERATIVE;  Surgeon: GENERIC ANESTHESIA PROVIDER;  [facility]     ZZC GASTRIC BYPASS,OBESE<100CM SHAYLEE-EN-Y  2002    lost 300 pounds      Allergies   Allergen Reactions     Bactrim [Sulfamethoxazole-Trimethoprim] Rash     Penicillins Anaphylaxis     Please see Antimicrobial Management Team allergy assessment note 10/10/2018. Patient reported tolerating amoxicillin.  Tolerating cefepime and ceftriaxone without reaction 6/23     Ertapenem Nausea and Vomiting     Doxycycline Rash     Vancomycin Rash     Rash after receiving vancomycin 3/28/16 (infusion reaction?). Tolerated with slower infusion and diphenhydramine premed.  Tolerated 1250mg over 90minutes 7/2023.      Social History     Tobacco Use     Smoking status: Light Smoker     Packs/day: 0.50     Years: 3.00     Pack years: 1.50     Types: Cigarettes     Smokeless tobacco: Never     Tobacco comments:     2/4/2021    smokes 3 cigarettes/day   Substance Use Topics     Alcohol use: No     Comment: quit 2002      Wt Readings from Last 1 Encounters:   07/05/23 87.7 kg (193 lb 6.4 oz)        Anesthesia Evaluation   Pt has had prior anesthetic. Type: General and MAC.    No history of anesthetic complications       ROS/MED HX  ENT/Pulmonary:       Neurologic:       Cardiovascular:     (+) -----Previous cardiac testing   Echo: Date: 7/22 Results:  Interpretation Summary  Left ventricular function is decreased. The ejection fraction is 45-50%  (mildly reduced).  Global right ventricular function is borderline reduced.  No significant valvular abnormalities present. No obvious valvular vegetations  seen.  Dilation of the inferior vena cava is present with abnormal respiratory  variation in diameter.  Stress Test: Date: Results:    ECG Reviewed: Date: Results:    Cath: Date: Results:      METS/Exercise Tolerance:     Hematologic:       Musculoskeletal:        GI/Hepatic:     (+) GERD, hiatal hernia,     Renal/Genitourinary:       Endo:       Psychiatric/Substance Use:       Infectious Disease:       Malignancy:       Other:            Physical Exam    Airway        Mallampati: I   TM distance: > 3 FB   Neck ROM: full   Mouth opening: > 3 cm    Respiratory Devices and Support         Dental       (+) Edentulous      Cardiovascular             Pulmonary                   OUTSIDE LABS:  CBC:   Lab Results   Component Value Date    WBC 10.0 07/05/2023    WBC 10.3 07/04/2023    HGB 10.7 (L) 07/05/2023    HGB 11.0 (L) 07/04/2023    HCT 35.0 (L) 07/05/2023    HCT 36.7 (L) 07/04/2023     07/05/2023     07/04/2023     BMP:   Lab Results   Component Value Date     07/06/2023     07/05/2023    POTASSIUM 3.7 07/06/2023    POTASSIUM 3.9 07/05/2023    CHLORIDE 106 07/06/2023    CHLORIDE 108 (H) 07/05/2023    CO2 24 07/06/2023    CO2 24 07/05/2023    BUN 17.1 07/06/2023    BUN 16.9 07/05/2023    CR 0.76 07/06/2023    CR 0.71 07/05/2023     (H) 07/06/2023     (H) 07/06/2023     COAGS:   Lab Results   Component Value Date    PTT 28 01/21/2023    INR 1.13 07/06/2023    FIBR 345 07/07/2022     POC:   Lab Results   Component Value Date    BGM 83 03/23/2021     HEPATIC:   Lab Results   Component Value Date    ALBUMIN 3.9 07/06/2023    PROTTOTAL 6.8 07/06/2023    ALT 14 07/06/2023    AST 17 07/06/2023    ALKPHOS 69 07/06/2023    BILITOTAL 0.5 07/06/2023     OTHER:   Lab Results   Component Value Date    LACT 0.9 07/04/2023    A1C 4.9 07/23/2013    SILAS 8.8 07/06/2023    PHOS 3.3 07/06/2023    MAG 2.1 07/06/2023    LIPASE 20 05/28/2023    AMYLASE 178 (H) 06/28/2012    TSH 4.06 07/07/2022    CRP <2.9 10/04/2022    SED 17 07/04/2023       Anesthesia Plan    ASA Status:  3   NPO Status:  NPO Appropriate    Anesthesia Type: MAC.     - Reason for MAC: straight local not clinically adequate   Induction: Intravenous.   Maintenance: TIVA.         Consents    Anesthesia Plan(s) and associated risks, benefits, and realistic alternatives discussed. Questions answered and patient/representative(s) expressed understanding.    - Discussed:     - Discussed with:  Patient      - Extended Intubation/Ventilatory Support Discussed: No.      - Patient is DNR/DNI Status: No    Use of blood products discussed: No .     Postoperative Care    Pain management: IV analgesics.   PONV prophylaxis: Background Propofol Infusion, Ondansetron (or other 5HT-3)     Comments:                Gay Beasley MD

## 2023-07-06 NOTE — PROGRESS NOTES
Therapeutic room search done by security.  Items taken from room include:     10 mL hospital syringe, unopened with needle attached   3 mL hospital syringe, unopened with needle attached   2 pairs of scissors (not hospital supplied)   Pocket knife         Security unable to store items. Security left items with patient care supervisor, Jelena Claudio.

## 2023-07-06 NOTE — PROVIDER NOTIFICATION
#1371    Pt back from RONEL, unable to perform it. Letting manager know was well to facilitate room search.

## 2023-07-06 NOTE — PROGRESS NOTES
"Pt came to nursing desk and notified RN that he was going to go outside. Pt was not hooked up to any IV meds and a green cap was not on his PIV. Upon discussion with other RNs on the floor, nobody disconnected the patient from parenteral nutrition. In his room the IV pump was turned off and the IV line was uncapped on the floor/bench. It is unclear how long pt was disconnected.     When pt returned from unit pt BG was 105. Pt admitted that he disconnected himself from the IV because he \"is used to being at home\". Pt educated to not disconnect himself from the IV and was agreeable.     Per pharmacist Mike, PPN bag is likely contaminated. Pharmacy is able to remake a new bag in about an hour.  Writer discussed situation with Ronen Garrison MD. Ok to hold off on dextrose 10% since BG is stable and new bag can be made soon. Will need clarification on plan with PPN and pt going outside with [facility] policy of no IVs off PCU. RN requested Pt care order for ok to disconnect from PPN temporarily to go outside.          "

## 2023-07-07 ENCOUNTER — APPOINTMENT (OUTPATIENT)
Dept: CARDIOLOGY | Facility: CLINIC | Age: 53
DRG: 315 | End: 2023-07-07
Payer: COMMERCIAL

## 2023-07-07 ENCOUNTER — ANESTHESIA (OUTPATIENT)
Dept: SURGERY | Facility: CLINIC | Age: 53
DRG: 315 | End: 2023-07-07
Payer: COMMERCIAL

## 2023-07-07 ENCOUNTER — APPOINTMENT (OUTPATIENT)
Dept: ULTRASOUND IMAGING | Facility: CLINIC | Age: 53
DRG: 315 | End: 2023-07-07
Payer: COMMERCIAL

## 2023-07-07 LAB
ANION GAP SERPL CALCULATED.3IONS-SCNC: 8 MMOL/L (ref 7–15)
BACTERIA BLD CULT: ABNORMAL
BUN SERPL-MCNC: 15.4 MG/DL (ref 6–20)
CALCIUM SERPL-MCNC: 8.5 MG/DL (ref 8.6–10)
CHLORIDE SERPL-SCNC: 108 MMOL/L (ref 98–107)
CREAT SERPL-MCNC: 0.62 MG/DL (ref 0.67–1.17)
DEPRECATED HCO3 PLAS-SCNC: 25 MMOL/L (ref 22–29)
GFR SERPL CREATININE-BSD FRML MDRD: >90 ML/MIN/1.73M2
GLUCOSE BLDC GLUCOMTR-MCNC: 106 MG/DL (ref 70–99)
GLUCOSE BLDC GLUCOMTR-MCNC: 112 MG/DL (ref 70–99)
GLUCOSE BLDC GLUCOMTR-MCNC: 133 MG/DL (ref 70–99)
GLUCOSE BLDC GLUCOMTR-MCNC: 94 MG/DL (ref 70–99)
GLUCOSE SERPL-MCNC: 120 MG/DL (ref 70–99)
LVEF ECHO: NORMAL
MAGNESIUM SERPL-MCNC: 2.2 MG/DL (ref 1.7–2.3)
PHOSPHATE SERPL-MCNC: 3.3 MG/DL (ref 2.5–4.5)
POTASSIUM SERPL-SCNC: 3.8 MMOL/L (ref 3.4–5.3)
RADIOLOGIST FLAGS: ABNORMAL
SODIUM SERPL-SCNC: 141 MMOL/L (ref 136–145)
VANCOMYCIN SERPL-MCNC: 8.9 UG/ML

## 2023-07-07 PROCEDURE — 999N000141 HC STATISTIC PRE-PROCEDURE NURSING ASSESSMENT

## 2023-07-07 PROCEDURE — 250N000011 HC RX IP 250 OP 636: Performed by: NURSE ANESTHETIST, CERTIFIED REGISTERED

## 2023-07-07 PROCEDURE — 250N000013 HC RX MED GY IP 250 OP 250 PS 637

## 2023-07-07 PROCEDURE — 250N000011 HC RX IP 250 OP 636: Mod: JZ

## 2023-07-07 PROCEDURE — 80202 ASSAY OF VANCOMYCIN: CPT | Performed by: STUDENT IN AN ORGANIZED HEALTH CARE EDUCATION/TRAINING PROGRAM

## 2023-07-07 PROCEDURE — 120N000002 HC R&B MED SURG/OB UMMC

## 2023-07-07 PROCEDURE — 250N000009 HC RX 250: Performed by: NURSE ANESTHETIST, CERTIFIED REGISTERED

## 2023-07-07 PROCEDURE — 83735 ASSAY OF MAGNESIUM: CPT | Performed by: STUDENT IN AN ORGANIZED HEALTH CARE EDUCATION/TRAINING PROGRAM

## 2023-07-07 PROCEDURE — 250N000011 HC RX IP 250 OP 636: Performed by: STUDENT IN AN ORGANIZED HEALTH CARE EDUCATION/TRAINING PROGRAM

## 2023-07-07 PROCEDURE — 258N000003 HC RX IP 258 OP 636: Performed by: NURSE ANESTHETIST, CERTIFIED REGISTERED

## 2023-07-07 PROCEDURE — 93320 DOPPLER ECHO COMPLETE: CPT | Mod: 26 | Performed by: INTERNAL MEDICINE

## 2023-07-07 PROCEDURE — 93312 ECHO TRANSESOPHAGEAL: CPT | Mod: 26 | Performed by: INTERNAL MEDICINE

## 2023-07-07 PROCEDURE — 360N000075 HC SURGERY LEVEL 2, PER MIN

## 2023-07-07 PROCEDURE — 710N000009 HC RECOVERY PHASE 1, LEVEL 1, PER MIN

## 2023-07-07 PROCEDURE — 93971 EXTREMITY STUDY: CPT | Mod: 26 | Performed by: RADIOLOGY

## 2023-07-07 PROCEDURE — 99233 SBSQ HOSP IP/OBS HIGH 50: CPT | Mod: GC | Performed by: INTERNAL MEDICINE

## 2023-07-07 PROCEDURE — 99207 PR APP CREDIT; MD BILLING SHARED VISIT: CPT | Performed by: INTERNAL MEDICINE

## 2023-07-07 PROCEDURE — 84100 ASSAY OF PHOSPHORUS: CPT | Performed by: STUDENT IN AN ORGANIZED HEALTH CARE EDUCATION/TRAINING PROGRAM

## 2023-07-07 PROCEDURE — 93325 DOPPLER ECHO COLOR FLOW MAPG: CPT

## 2023-07-07 PROCEDURE — 36415 COLL VENOUS BLD VENIPUNCTURE: CPT | Performed by: STUDENT IN AN ORGANIZED HEALTH CARE EDUCATION/TRAINING PROGRAM

## 2023-07-07 PROCEDURE — 93325 DOPPLER ECHO COLOR FLOW MAPG: CPT | Mod: 26 | Performed by: INTERNAL MEDICINE

## 2023-07-07 PROCEDURE — 370N000017 HC ANESTHESIA TECHNICAL FEE, PER MIN

## 2023-07-07 PROCEDURE — 87040 BLOOD CULTURE FOR BACTERIA: CPT

## 2023-07-07 PROCEDURE — 93971 EXTREMITY STUDY: CPT | Mod: RT

## 2023-07-07 PROCEDURE — 258N000003 HC RX IP 258 OP 636: Performed by: STUDENT IN AN ORGANIZED HEALTH CARE EDUCATION/TRAINING PROGRAM

## 2023-07-07 PROCEDURE — 80048 BASIC METABOLIC PNL TOTAL CA: CPT | Performed by: STUDENT IN AN ORGANIZED HEALTH CARE EDUCATION/TRAINING PROGRAM

## 2023-07-07 PROCEDURE — 99232 SBSQ HOSP IP/OBS MODERATE 35: CPT | Mod: GC | Performed by: STUDENT IN AN ORGANIZED HEALTH CARE EDUCATION/TRAINING PROGRAM

## 2023-07-07 PROCEDURE — 258N000003 HC RX IP 258 OP 636: Performed by: EMERGENCY MEDICINE

## 2023-07-07 PROCEDURE — 250N000013 HC RX MED GY IP 250 OP 250 PS 637: Performed by: STUDENT IN AN ORGANIZED HEALTH CARE EDUCATION/TRAINING PROGRAM

## 2023-07-07 PROCEDURE — 250N000011 HC RX IP 250 OP 636: Performed by: EMERGENCY MEDICINE

## 2023-07-07 RX ORDER — SODIUM CHLORIDE, SODIUM LACTATE, POTASSIUM CHLORIDE, CALCIUM CHLORIDE 600; 310; 30; 20 MG/100ML; MG/100ML; MG/100ML; MG/100ML
INJECTION, SOLUTION INTRAVENOUS CONTINUOUS PRN
Status: DISCONTINUED | OUTPATIENT
Start: 2023-07-07 | End: 2023-07-07

## 2023-07-07 RX ORDER — PROPOFOL 10 MG/ML
INJECTION, EMULSION INTRAVENOUS CONTINUOUS PRN
Status: DISCONTINUED | OUTPATIENT
Start: 2023-07-07 | End: 2023-07-07

## 2023-07-07 RX ORDER — SALIVA STIMULANT COMB. NO.3
2 SPRAY, NON-AEROSOL (ML) MUCOUS MEMBRANE 4 TIMES DAILY PRN
Status: DISCONTINUED | OUTPATIENT
Start: 2023-07-07 | End: 2023-07-11 | Stop reason: HOSPADM

## 2023-07-07 RX ORDER — FENTANYL CITRATE 50 UG/ML
INJECTION, SOLUTION INTRAMUSCULAR; INTRAVENOUS PRN
Status: DISCONTINUED | OUTPATIENT
Start: 2023-07-07 | End: 2023-07-07

## 2023-07-07 RX ORDER — LIDOCAINE 40 MG/G
CREAM TOPICAL
Status: ACTIVE | OUTPATIENT
Start: 2023-07-07 | End: 2023-07-10

## 2023-07-07 RX ADMIN — OXYCODONE HYDROCHLORIDE 10 MG: 5 SOLUTION ORAL at 22:54

## 2023-07-07 RX ADMIN — MIDAZOLAM 2 MG: 1 INJECTION INTRAMUSCULAR; INTRAVENOUS at 12:55

## 2023-07-07 RX ADMIN — SODIUM CHLORIDE, POTASSIUM CHLORIDE, SODIUM LACTATE AND CALCIUM CHLORIDE: 600; 310; 30; 20 INJECTION, SOLUTION INTRAVENOUS at 12:55

## 2023-07-07 RX ADMIN — VANCOMYCIN HYDROCHLORIDE 1250 MG: 10 INJECTION, POWDER, LYOPHILIZED, FOR SOLUTION INTRAVENOUS at 10:51

## 2023-07-07 RX ADMIN — TOPICAL ANESTHETIC 2 SPRAY: 200 SPRAY DENTAL; PERIODONTAL at 12:55

## 2023-07-07 RX ADMIN — DEXTROAMPHETAMINE SACCHARATE, AMPHETAMINE ASPARTATE, DEXTROAMPHETAMINE SULFATE, AND AMPHETAMINE SULFATE 20 MG: 2.5; 2.5; 2.5; 2.5 TABLET ORAL at 09:42

## 2023-07-07 RX ADMIN — VANCOMYCIN HYDROCHLORIDE 1500 MG: 10 INJECTION, POWDER, LYOPHILIZED, FOR SOLUTION INTRAVENOUS at 20:56

## 2023-07-07 RX ADMIN — OXYCODONE HYDROCHLORIDE 10 MG: 5 SOLUTION ORAL at 05:36

## 2023-07-07 RX ADMIN — DIPHENHYDRAMINE HYDROCHLORIDE 50 MG: 25 SOLUTION ORAL at 09:49

## 2023-07-07 RX ADMIN — CARVEDILOL 6.25 MG: 6.25 TABLET, FILM COATED ORAL at 09:42

## 2023-07-07 RX ADMIN — PROPOFOL 150 MCG/KG/MIN: 10 INJECTION, EMULSION INTRAVENOUS at 12:59

## 2023-07-07 RX ADMIN — ONDANSETRON 4 MG: 4 TABLET, ORALLY DISINTEGRATING ORAL at 09:47

## 2023-07-07 RX ADMIN — FENTANYL CITRATE 50 MCG: 50 INJECTION, SOLUTION INTRAMUSCULAR; INTRAVENOUS at 12:55

## 2023-07-07 RX ADMIN — SUCRALFATE 1 G: 1 SUSPENSION ORAL at 09:43

## 2023-07-07 RX ADMIN — SUCRALFATE 1 G: 1 SUSPENSION ORAL at 11:14

## 2023-07-07 RX ADMIN — PANTOPRAZOLE SODIUM 40 MG: 40 TABLET, DELAYED RELEASE ORAL at 09:42

## 2023-07-07 RX ADMIN — ONDANSETRON 4 MG: 4 TABLET, ORALLY DISINTEGRATING ORAL at 01:30

## 2023-07-07 RX ADMIN — ONDANSETRON 4 MG: 4 TABLET, ORALLY DISINTEGRATING ORAL at 18:46

## 2023-07-07 RX ADMIN — OXYCODONE HYDROCHLORIDE 10 MG: 5 SOLUTION ORAL at 17:53

## 2023-07-07 RX ADMIN — DIPHENHYDRAMINE HYDROCHLORIDE 50 MG: 25 SOLUTION ORAL at 19:59

## 2023-07-07 RX ADMIN — CARVEDILOL 6.25 MG: 6.25 TABLET, FILM COATED ORAL at 17:53

## 2023-07-07 RX ADMIN — ENOXAPARIN SODIUM 80 MG: 80 INJECTION SUBCUTANEOUS at 19:59

## 2023-07-07 RX ADMIN — SUCRALFATE 1 G: 1 SUSPENSION ORAL at 16:58

## 2023-07-07 RX ADMIN — NYSTATIN: 100000 CREAM TOPICAL at 09:46

## 2023-07-07 RX ADMIN — Medication 2 SPRAY: at 05:36

## 2023-07-07 RX ADMIN — ENOXAPARIN SODIUM 80 MG: 80 INJECTION SUBCUTANEOUS at 09:43

## 2023-07-07 RX ADMIN — LORAZEPAM 1 MG: 0.5 TABLET ORAL at 19:59

## 2023-07-07 RX ADMIN — OXYCODONE HYDROCHLORIDE 10 MG: 5 SOLUTION ORAL at 01:30

## 2023-07-07 RX ADMIN — OXYCODONE HYDROCHLORIDE 10 MG: 5 SOLUTION ORAL at 09:41

## 2023-07-07 ASSESSMENT — ACTIVITIES OF DAILY LIVING (ADL)
ADLS_ACUITY_SCORE: 26
ADLS_ACUITY_SCORE: 24
ADLS_ACUITY_SCORE: 26

## 2023-07-07 NOTE — PLAN OF CARE
"[id number]    A&Ox4. VSS on RA. Afebrile. Pain managed with prn oxycodone x1. Intermittent nausea managed with prn zofran x1. Denies SOB. Q6hr . Continuous PPN stopped at 0850 per pt request, pt wanted to walk outside. Pt care order that this is ok. When pt arrived back to floor refused to be reconnected to PPN stating, \"I'm done with this til I get home.\" Education provided and team aware. G tube clamped and not being used. Voiding without saving, reports good output. RONEL completed in OR. Orders to have PICC line placed. Ultra sound of RUE needs to be completed prior to PICC placement. Ultrasound still needs to be done. Up with SBA. SBA encouraged but pt gets up independently. Continue with plan of care.     "

## 2023-07-07 NOTE — PROGRESS NOTES
"Care Management Follow Up    Length of Stay (days): 3    Expected Discharge Date: 07/08/2023     Concerns to be Addressed: discharge planning     Patient plan of care discussed at interdisciplinary rounds: Yes    Anticipated Discharge Disposition: Home Care, Skilled Nursing Facility, Transitional Care (recommendations are as such as final line care plan for discharge TBD.     Anticipated Discharge Services: Home health agency for TPN  Anticipated Discharge DME: IV supplies (supplied by home infusion agency)    Patient/family educated on Medicare website which has current facility and service quality ratings: no (Pt declining SNF for PICC cares.).  Education Provided on the Discharge Plan: Yes  Patient/Family in Agreement with the Plan:  Plan TBD    Referrals Placed by CM/SW:  None yet, patient open to [facility] previous to admission.   Private pay costs discussed: Not applicable    Additional Information:  Shay[facility] liaison messaged writer asking what patient's discharge plan is, writer updated Shaylee that patient is having RONEL today, and discharge plan is pending those results.  Patient had previous HC with [facility], and they told writer on 7/5/23 that they didn't feel safe accepting patient back for home care.      Per Shaylee,     \"I had our office nurses call [facility] and they said IF he leaves AMA they will not take him back.  But if we have orders and it's just a normal discharge they will take him back.\"    [facility] will continue to follow for discharge planning.          Lizz Valdez, RN, BSN  7D RN Care Coordinator    [address] [address]  [email or web address]  Gender pronouns: she/her  Pager: [phone]    "

## 2023-07-07 NOTE — PROGRESS NOTES
Patient has been educated on potential risks of choosing to leave the unit and that the responsibility for patient well-being will belong to the patient. Pt has been informed that admission to hospital is due to need for medical treatment. Education given to the patient on some of the potential risks included but are not limited to:      - lack of access to nursing intervention      - possible missed appointments with MD, therapies, tests      - possible missed medications, antibiotics, management of IV's    Patient Response: Acceptance, understanding    Patient notified staff prior to leaving unit: Yes  Coban wrap placed over IV prior to pt leaving unit Yes    RN disconnected and paused PPN and Juo to leave. Pt educated that he had already been premedicated for the vancomycin, understood and told he would return when asked and was compliant.

## 2023-07-07 NOTE — CONSULTS
"[facility]   Consult Note - Addiction Service     Date of Admission:  7/4/2023    Consult Requested by: Dr. Gonzalez  Reason for Consult: Concern for high suspicion of drug use after syringe found in the room.    Assessment & Plan   Parker Acevedo is a 52 year old male admitted on 7/4/2023 for left subclavian DVT, possible concurrent cellulitis, and a recent untreated MRSE bacteremia - all most likely caused by his exisitng PICC line . He has a history of catheter-associated infections and venous occlusions. Addiction team was consulted today for highly suspicious behavior with syringe found in his room.    # Opioid Use Disorder  Patient mentioned that he uses the drugs prescribed and have not gone overboard with his prescribed medications. He also stated that he doesn't use other illicit street drugs. He mentioned that his pain is stable with current doses. The only drugs that he has done in the past was pots. He is bewildered with the accusation. He said that he uses the syringe for his Lovenox. He has not used any other drugs or increased his usual drugs dose in the system. He said that he is open to drug testing but at the same time, mentioned that he wants to first clear with his  and find grounds for suing for accusation. He said \" I feel accused and I will first talk with my  and my family : sister before doing anything.\" He mentioned that the dosage for his pain are accurate and he does not want any changes. He says that his medication are optimum and does not want any changes. does not want alternate medications. Mentioned that he has tried different pain medication in the past and does not want any changes right now.  Also mentioned that he had done urine drug screening recently and can allow me to access for it.   Again on talking next time, he said that he is willing to do drug screening test. He did not mention about  at that time. "   -We do not think he is using any illicit drugs at the moment with his history.   -Per drug screen: positive for amphetamine as per his usual drug regimen. No traces of other drugs which coincides with his history.     If opioid withdrawal symptoms, consider:  --Clonidine:  0.1 to 0.3 mg every 6 to 8 hours (maximum: 1.2 mg/day)  --Gabapentin, 100 mg: Take 1-2 capsules (100-200 mg) by mouth 3 times daily  --Zofran 4 MG disintegrating tablet, take 1-2 tablets (4-8 mg) by mouth every 8 hours as needed for nausea   --Atarax 25 MG tablet, Take 1-2 tablets (25-50 mg) by mouth every 4 hours as needed for anxiety    If constipation, consider  --Constipation: miralax, senna     # Mental health:  Consider counseller for stress relieve.    # Harm Reduction:  On asking for his pain management. Mentioned that he is on adequate dose of pain meds and does not need to increase drug dose for pain or any other. He is not open to any changes now and denies street iv drug use now.    # Immunization review:   Consider Hep A IgG and Hep B serologies  Recent hep B done last year in 7/2022     The patient's care was discussed with the Attending Physician, Dr. Oliver.    I spent 120 minutes on the unit/floor managing the care of Parker Acevedo. Over 50% of my time was spent on the following:   Significant education and counseling spent on: how substance use disorders and dependence occur, and how it can become a chronic relapsing and remitting medical condition.  In addition, the pharmacology of medical treatments including oxycodone and fentanyl, the importance of follow up, and Harm Reduction advice on how to use substances in a less harmful way why trying to cut down were discussed today.      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     Complication of anesthesia      Difficulty swallowing      Gastric ulcer, unspecified as acute or chronic, without mention of hemorrhage, perforation, or obstruction      Gastro-oesophageal reflux disease      Head injury      Hiatal hernia      Other bladder disorder      Other chronic pain      PONV (postoperative nausea and vomiting)      Severe malnutrition (H)     TPN     Short gut syndrome      Tobacco abuse        Past Surgical History:   Procedure Laterality Date     AMPUTATION       APPENDECTOMY       BACK SURGERY  11/3/2014    curve in the spine     BIOPSY LYMPH NODE CERVICAL N/A 2/20/2015    Procedure: BIOPSY LYMPH NODE CERVICAL;  Surgeon: Baron Scanlon MD;  [facility]     CHOLECYSTECTOMY       COLONOSCOPY N/A 7/14/2021    Procedure: COLONOSCOPY;  Surgeon: Jimbo Estrada MD;  Location: [facility] OR     COLONOSCOPY N/A 4/13/2022    Procedure: COLONOSCOPY;  Surgeon: Jimbo Estrada MD;  Location: [facility] OR     DISCECTOMY, FUSION CERVICAL ANTERIOR ONE LEVEL, COMBINED N/A 2/15/2017    Procedure: COMBINED DISCECTOMY, FUSION CERVICAL ANTERIOR ONE LEVEL;  Surgeon: Darren Campos MD;  [facility]     ENDOSCOPIC INSERTION TUBE GASTROSTOMY  9/9/2013    Procedure: ENDOSCOPIC INSERTION TUBE GASTROSTOMY;;  Surgeon: Francis Vyas MD;  [facility]     ENDOSCOPIC ULTRASOUND UPPER GASTROINTESTINAL TRACT (GI)  4/29/2011    Procedure:ENDOSCOPIC ULTRASOUND UPPER GASTROINTESTINAL TRACT (GI); Both Procedures done Conjointly; Surgeon:NEREIDA HOUSER; [facility]     ENDOSCOPIC ULTRASOUND UPPER GASTROINTESTINAL TRACT (GI)  9/9/2013     Procedure: ENDOSCOPIC ULTRASOUND UPPER GASTROINTESTINAL TRACT (GI);  Endoscopic Ultrasound Guide Gastrostomy Tube Placement  C-arm;  Surgeon: Noe Lizarraga MD;  [facility]     ENDOSCOPIC ULTRASOUND UPPER GASTROINTESTINAL TRACT (GI) N/A 2/24/2021    Procedure: ENDOSCOPIC ULTRASOUND, ESOPHAGOSCOPY / UPPER GASTROINTESTINAL TRACT (GI), esophagastrogastroduodenoscopy;  Surgeon: Berny Bach MD;  [facility]     ENDOSCOPY  03/25/11    EGD, [facility]     ENDOSCOPY  08/04/09    Upper Endoscopy, [facility]     ENDOSCOPY  01/05/09    Upper Endoscopy, [facility]     ESOPHAGOSCOPY, GASTROSCOPY, DUODENOSCOPY (EGD), COMBINED  4/20/2011    Procedure:COMBINED ESOPHAGOSCOPY, GASTROSCOPY, DUODENOSCOPY (EGD); Surgeon:BLU VYAS; [facility]     ESOPHAGOSCOPY, GASTROSCOPY, DUODENOSCOPY (EGD), COMBINED  6/15/2011    Procedure:COMBINED ESOPHAGOSCOPY, GASTROSCOPY, DUODENOSCOPY (EGD); Surgeon:BLU VYAS; [facility]     ESOPHAGOSCOPY, GASTROSCOPY, DUODENOSCOPY (EGD), COMBINED  6/12/2013    Procedure: COMBINED ESOPHAGOSCOPY, GASTROSCOPY, DUODENOSCOPY (EGD);;  Surgeon: Blu Vyas MD;  [facility]     ESOPHAGOSCOPY, GASTROSCOPY, DUODENOSCOPY (EGD), COMBINED  11/22/2013    Procedure: COMBINED ESOPHAGOSCOPY, GASTROSCOPY, DUODENOSCOPY (EGD);;  Surgeon: Blu Vyas MD;  [facility]     ESOPHAGOSCOPY, GASTROSCOPY, DUODENOSCOPY (EGD), COMBINED  4/30/2014    Procedure: COMBINED ESOPHAGOSCOPY, GASTROSCOPY, DUODENOSCOPY (EGD);  Surgeon: Blu Vyas MD;  [facility]     ESOPHAGOSCOPY, GASTROSCOPY, DUODENOSCOPY (EGD), COMBINED N/A 2/20/2015    Procedure: COMBINED ESOPHAGOSCOPY, GASTROSCOPY, DUODENOSCOPY (EGD), BIOPSY SINGLE OR MULTIPLE;  Surgeon: Baron Scanlon MD;  Location:  OR     ESOPHAGOSCOPY, GASTROSCOPY, DUODENOSCOPY (EGD), COMBINED N/A 9/30/2015    Procedure: COMBINED ESOPHAGOSCOPY, GASTROSCOPY, DUODENOSCOPY (EGD);   Surgeon: Francis Vyas MD;  Location:  GI     ESOPHAGOSCOPY, GASTROSCOPY, DUODENOSCOPY (EGD), COMBINED N/A 10/3/2019    Procedure: Upper Endoscopy;  Surgeon: Clif Morrow MD;  [facility]     GASTRECTOMY  6/22/2012    Procedure: GASTRECTOMY;  Open Approach, Excise Ulcers,Partial Gastrectomy, Esophagojejunostomy, Hiatal Hernia Repair, Extensive Lysis of Adhesions and Esaphagogastrodudenoscopy.;  Surgeon: Francis Vyas MD;  [facility]     GASTROJEJUNOSTOMY  08/26/09    Extensice enterolysis, partial resect. jejunum, part. resect gastric pouch, gastrojejunostomy anastomosis     HC ESOPH/GAS REFLUX TEST W NASAL IMPED ELECTRODE  8/5/2013    Procedure: ESOPHAGEAL IMPEDENCE FUNCTION TEST 1 HOUR OR LESS;  Surgeon: Halie Lang MD;  Location:  GI     HEAD & NECK SURGERY  2/15/2017    C5-C6     HERNIA REPAIR  2006    Umbilical hernia     HERNIORRHAPHY HIATAL  6/22/2012    Procedure: HERNIORRHAPHY HIATAL;;  Surgeon: Francis Vyas MD;  [facility]     HERNIORRHAPHY INGUINAL  11/22/2013    Procedure: HERNIORRHAPHY INGUINAL;;  Surgeon: Francis Vyas MD;  [facility]     INSERT PICC LINE Right 12/19/2019    Procedure: Picc Placement;  Surgeon: Per Dumont PA-C;  [facility]     INSERT PICC LINE Right 2/21/2020    Procedure: INSERTION, PICC;  Surgeon: Per Dumont PA-C;  [facility]     INSERT PORT VASCULAR ACCESS Right 12/19/2017    Procedure: INSERT PORT VASCULAR ACCESS;  Right Chest Port Placement ;  Surgeon: Lisandro Alejandro PA-C;  [facility]     INSERT PORT VASCULAR ACCESS Right 8/2/2018    Procedure: INSERT PORT VASCULAR ACCESS;  Place single lumen tunneled central venous access catheter;  Surgeon: Guy Jamil PA-C;  [facility]     IR CVC TUNNEL PLACEMENT > 5 YRS OF AGE  8/7/2019     IR CVC TUNNEL PLACEMENT > 5 YRS OF AGE  4/14/2020     IR CVC TUNNEL PLACEMENT > 5 YRS OF AGE  8/3/2020     IR CVC TUNNEL  PLACEMENT > 5 YRS OF AGE  9/4/2020     IR CVC TUNNEL PLACEMENT > 5 YRS OF AGE  2/5/2021     IR CVC TUNNEL PLACEMENT > 5 YRS OF AGE  3/23/2021     IR CVC TUNNEL PLACEMENT > 5 YRS OF AGE  1/13/2022     IR CVC TUNNEL PLACEMENT > 5 YRS OF AGE  5/12/2022     IR CVC TUNNEL PLACEMENT > 5 YRS OF AGE  7/13/2022     IR CVC TUNNEL REMOVAL LEFT  6/7/2023     IR CVC TUNNEL REMOVAL RIGHT  10/1/2019     IR CVC TUNNEL REMOVAL RIGHT  7/30/2020     IR CVC TUNNEL REMOVAL RIGHT  9/2/2020     IR CVC TUNNEL REMOVAL RIGHT  2/3/2021     IR CVC TUNNEL REMOVAL RIGHT  3/19/2021     IR CVC TUNNEL REMOVAL RIGHT  1/10/2022     IR CVC TUNNEL REMOVAL RIGHT  5/9/2022     IR CVC TUNNEL REMOVAL RIGHT  7/8/2022     IR CVC TUNNEL REVISION LEFT  8/10/2022     IR CVC TUNNEL REVISION RIGHT  5/7/2021     IR FOLLOW UP VISIT OUTPATIENT  8/7/2019     IR PICC EXCHANGE LEFT  6/8/2023     IR PICC PLACEMENT > 5 YRS OF AGE  3/7/2019     IR PICC PLACEMENT > 5 YRS OF AGE  12/19/2019     IR PICC PLACEMENT > 5 YRS OF AGE  2/21/2020     IR PICC PLACEMENT > 5 YRS OF AGE  6/7/2023     LAPAROTOMY EXPLORATORY  11/22/2013    Procedure: LAPAROTOMY EXPLORATORY;  Exploratory Laparotomy, Upper Endoscopy, Left Inguinal Hernia Repair;  Surgeon: Francis Vyas MD;  [facility]     ORTHOPEDIC SURGERY       PICC INSERTION Right 03/16/2017    5fr DL BioFlo PICC, 42cm (3cm external) in the R medial brachial vein w/ tip in the SVC RA junction.     PICC INSERTION Left 09/23/2017    5fr DL BioFlo PICC, 45cm (1cm external) in the L basilic vein w/ tip in the SVC RA junction.     PICC INSERTION Right 05/16/2019    5Fr - 43cm, Medial brachial vein, low SVC     PICC INSERTION Right 10/02/2019    5Fr - 43cm (2cm external), basilic vein, low SVC     SHAYLEE EN Y BOWEL  2003     SOFT TISSUE SURGERY       THORACIC SURGERY       TONSILLECTOMY       TRANSESOPHAGEAL ECHOCARDIOGRAM INTRAOPERATIVE N/A 1/8/2019    Procedure: TRANSESOPHAGEAL ECHOCARDIOGRAM INTRAOPERATIVE;  Surgeon: JEREL  ANESTHESIA PROVIDER;  [facility]     C GASTRIC BYPASS,OBESE<100CM SHAYLEE-EN-Y  2002    lost 300 pounds       Social History   Social History     Socioeconomic History     Marital status:      Spouse name: Rose     Number of children: 1     Years of education: Not on file     Highest education level: GED or equivalent   Occupational History     Not on file   Tobacco Use     Smoking status: Light Smoker     Packs/day: 0.50     Years: 3.00     Pack years: 1.50     Types: Cigarettes     Smokeless tobacco: Never     Tobacco comments:     2/4/2021    smokes 3 cigarettes/day   Vaping Use     Vaping Use: Never used   Substance and Sexual Activity     Alcohol use: No     Comment: quit 2002     Drug use: No     Sexual activity: Yes     Partners: Female     Birth control/protection: None     Comment: no protection   Other Topics Concern     Parent/sibling w/ CABG, MI or angioplasty before 65F 55M? No   Social History Narrative     Not on file     Social Determinants of Health     Financial Resource Strain: Medium Risk (5/20/2022)    Overall Financial Resource Strain (CARDIA)      Difficulty of Paying Living Expenses: Somewhat hard   Food Insecurity: No Food Insecurity (5/20/2022)    Hunger Vital Sign      Worried About Running Out of Food in the Last Year: Never true      Ran Out of Food in the Last Year: Never true   Transportation Needs: No Transportation Needs (5/20/2022)    PRAPARE - Transportation      Lack of Transportation (Medical): No      Lack of Transportation (Non-Medical): No   Physical Activity: Unknown (8/4/2020)    Exercise Vital Sign      Days of Exercise per Week: 2 days      Minutes of Exercise per Session: Not on file   Stress: No Stress Concern Present (8/4/2020)    [employer] [place] [employer] - Occupational Stress Questionnaire      Feeling of Stress : Only a little   Social Connections: Socially Isolated (8/4/2020)    Social Connection and Isolation Panel [NHANES]       Frequency of Communication with Friends and Family: Once a week      Frequency of Social Gatherings with Friends and Family: Never      Attends [employer]ism Services: Never      Active Member of Clubs or Organizations: No      Attends Club or Organization Meetings: Never      Marital Status:    Intimate Partner Violence: Not At Risk (8/4/2020)    Humiliation, Afraid, Rape, and Kick questionnaire      Fear of Current or Ex-Partner: No      Emotionally Abused: No      Physically Abused: No      Sexually Abused: No   Housing Stability: Low Risk  (8/4/2020)    Housing Stability Vital Sign      Unable to Pay for Housing in the Last Year: No      Number of Places Lived in the Last Year: 1      Unstable Housing in the Last Year: No       Family History   I have reviewed this patient's family history and updated it with pertinent information if needed.  Family History   Problem Relation Age of Onset     Gastrointestinal Disease Mother         Crohns disease     Anxiety Disorder Mother      Thyroid Disease Mother         Grave's disease     Cancer Father         ear cancer-skin cancer/melanoma     Breast Cancer Maternal Grandmother      Macular Degeneration Maternal Grandfather      Anxiety Disorder Sister      Diabetes Maternal Uncle      Breast Cancer Other      Hypertension No family hx of      Hyperlipidemia No family hx of      Cerebrovascular Disease No family hx of      Prostate Cancer No family hx of      Depression No family hx of      Anesthesia Reaction No family hx of      Asthma No family hx of      Osteoporosis No family hx of      Genetic Disorder No family hx of      Obesity No family hx of      Mental Illness No family hx of      Substance Abuse No family hx of      Glaucoma No family hx of          Medications   I have reviewed this patient's current medications    Allergies   No Known Allergies    Subjective:   Patient is happy to be discharged today. Has no acute concerns     Physical Exam   Temp:  98.2  F (36.8  C) Temp src: Oral BP: 118/84 Pulse: 68   Resp: 18 SpO2: 98 % O2 Device: None (Room air) Oxygen Delivery: 2 LPM     Constitutional: awake  Eyes: vision intact  ENT: atramatic  Hematologic / Lymphatic: no cervical lymphadenopathy and no supraclavicular lymphadenopathy  Respiratory: Clear to auscultation BL  Cardiovascular: Normal S1/S2 with no murmurs, rub, or gallops   GI: Non-distended, normal bowel sounds, non-tender  Skin: no bruising or bleeding and normal skin color, texture, turgor  Musculoskeletal: LUE edema from forearm distally with slight erythema and tenderness on palpation.   Neurologic: Awake, alert, oriented to name, place and time.       Due to regulation of Title 42 of the Code of Federal Regulations (CFR) Part 2: Confidentiality laws apply to this note and the information wherein.  Thus, this note cannot be copy and pasted into any other health care staff's note nor can it be included in general medical records sent to ANY outside agency without the patient's written consent.\

## 2023-07-07 NOTE — PLAN OF CARE
Goal Outcome Evaluation:      Plan of Care Reviewed With: patient    VSS. Talkative and fidgety. Pt wanted to go outside so he was disconnected from his PPN infusion. He returned and gave a urine sample for toxicity screening. He asked for IV benedryl for vanco premed but he did take the oral form after talking to the doctor. Continue to monitor, notify MD with concerns.

## 2023-07-07 NOTE — PROGRESS NOTES
CLINICAL NUTRITION SERVICES - BRIEF NOTE    Findings  Received page from Pharmacy that patient was refusing lipids. Visited with patient to clarify if there are any questions or misconceptions pertaining to use of lipids. Patient reports that he gets GI upset occasionally from lipids. Discussed importance of lipids, to prevent essential fatty acid deficiency and that we are currently underfeeding (cannot meet needs with only a peripheral line) so lipids make up a larger portion of nutrition compared to when he was utilizing TPN in central line. Patient stated understanding and stated that he will continue lipids at home, and expects to leave tomorrow and will not stay through weekend.     INTERVENTIONS  Implementation  No changes at this time. Provided education on importance of lipids.      Follow up/Monitoring  Will continue to follow up per protocol.     Geraldine Oquendo RD, LD   6A/7D pager [phone]  Weekend pager [phone]

## 2023-07-07 NOTE — PROGRESS NOTES
"Primary team:  Place order panel  OPAT Pharmacy ([facility]) Review and ID Care Transition   Place imaging order(s) requested above prior to discharge.  Contact ID teams about missed ID clinic appointments and/or ongoing therapy needs.    Prolonged Parenteral/Oral Antibiotic Recommendations and ID Follow up  This template provides final ID recommendations as of this date.     Infectious Diseases Indication: MRSE CLABSI with catheter-associated thrombophlebitis    Antibiotic Information  Name of Antibiotic Dose of Antibiotic1 Anticipated duration Effective start date2 End date   vancomycin 1500mg Q 12hr (pharm to assist) 4 weeks 7/5/23 8/2/23                 1.Dose of antibiotic will need to be renally adjusted if creatinine clearance changes  2.Effective start date is the date of adequate therapy with appropriate spectrum    Method of antibiotic delivery:PICC line. Is the line being used for another indication besides antimicrobials? Yes At the end of therapy should the line used for antimicrobials be removed or de-accessed? No. Selecting \"yes\" will function as written order to remove PICC line or de-access the indwelling line at the end of therapy.    Weekly labs required: CBC with diff, BMP, CRP and Vancomycin level. Dr. Alanis will follow labs at discharge until ID follow up. Fax labs to ID clinic.    Are there pending microbial tests: Yes Cultures     Imaging for ID follow up: ID Imaging: No.     All OPAT discharges will be scheduled with Lakesha Mcgregor NP within 2 weeks of discharge unless otherwise specified with another provider. Type of clinic appointment Okay for in person or a virtual visit.   If additional appointments are needed later in the antibiotic course specify the provider Dr. Horne or Next available General ID Provider, timing of visit 4 weeks, and the type of visit Okay for in person or a virtual visit.     ID provider route note: OPAT RN Care Coordinator [facility] " [place] [facility] Information:  Phone: [phone]  Fax: [phone] (Attention Carlos Sorensen)

## 2023-07-07 NOTE — PROGRESS NOTES
Vascular Access Services Notes:    PICC insertion on-hold until RUE is NEGATIVE for thrombosis (DVT on 3/9/23 ultrasound). JAMES has a DVT in the subclavian vein (ultrasound on 7/3/23). Dr Monteiro to order the RUE ultrasound.      Vasile Landon, BSN, RN [facility]

## 2023-07-07 NOTE — ANESTHESIA POSTPROCEDURE EVALUATION
Patient: Parker Acevedo    Procedure: Procedure(s):  Transesophageal echocardiogram in the OR       Anesthesia Type:  MAC    Note:  Disposition: Inpatient   Postop Pain Control: Uneventful            Sign Out: Well controlled pain   PONV: No   Neuro/Psych: Uneventful            Sign Out: Acceptable/Baseline neuro status   Airway/Respiratory: Uneventful            Sign Out: Acceptable/Baseline resp. status   CV/Hemodynamics: Uneventful            Sign Out: Acceptable CV status; No obvious hypovolemia; No obvious fluid overload   Other NRE: NONE   DID A NON-ROUTINE EVENT OCCUR? No           Last vitals:  Vitals Value Taken Time   /73 07/07/23 1343   Temp 36.6  C (97.8  F) 07/07/23 1329   Pulse 66 07/07/23 1339   Resp 18 07/07/23 1343   SpO2 100 % 07/07/23 1343   Vitals shown include unvalidated device data.    Electronically Signed By: Gay Beasley MD  July 7, 2023  2:28 PM

## 2023-07-07 NOTE — ANESTHESIA CARE TRANSFER NOTE
Patient: Parker Acevedo    Procedure: Procedure(s):  Transesophageal echocardiogram in the OR       Diagnosis: Endocarditis [I38]  Diagnosis Additional Information: No value filed.    Anesthesia Type:   MAC     Note:      Level of Consciousness: awake  Oxygen Supplementation: nasal cannula  Level of Supplemental Oxygen (L/min / FiO2): 4  Independent Airway: airway patency satisfactory and stable  Dentition: dentition unchanged  Vital Signs Stable: post-procedure vital signs reviewed and stable  Report to RN Given: handoff report given  Patient transferred to: PACU  Comments: Pt remains stable, monitors on alarms in place, report to PACU RN, no complications  Handoff Report: Identifed the Patient, Identified the Reponsible Provider, Reviewed the pertinent medical history, Discussed the surgical course, Reviewed Intra-OP anesthesia mangement and issues during anesthesia, Set expectations for post-procedure period and Allowed opportunity for questions and acknowledgement of understanding      Vitals:  Vitals Value Taken Time   /71 07/07/23 1329   Temp     Pulse 65 07/07/23 1329   Resp     SpO2 100 % 07/07/23 1330   Vitals shown include unvalidated device data.    Electronically Signed By: SAILAJA Zuniga CRNA  July 7, 2023  1:32 PM

## 2023-07-07 NOTE — PROGRESS NOTES
"[facility]    Progress Note - Medicine Service, LINA TEAM 5       Date of Admission:  7/4/2023    Assessment & Plan   Parker Acevedo is a 52 year old male admitted on 7/4/2023 for left subclavian DVT, possible concurrent cellulitis, and a recent untreated MRSE bacteremia - all most likely caused by his exisitng PICC line . He has a history of catheter-associated infections and venous occlusions.     Updates:  - ID recs appreciated   > RONEL tomorrow   > Continue vanc for 4wk duration  - Will discuss with IR in the AM    Of Note: See 7/6 progress note for pt experience and room search.      #Possible LUE nonpurulent cellulitis  #Septic Thrombus Left Subclavian Vein, Likely  #Recent MRSE bacteremia  #Hx of Cm catheter-associated polymicrobial bacteremia (06/2023)  #Hx of fungemia  Blood cx's from [place] grew methicillin-resistant Staph Epidermidis. Believe these infections were also provoked by his existing PICC line and poor line care. Blood cultures negative within this recent 48hr period. Pt unable to have PICC placed d/t right and left subclavian DVT's. Will need radiology to place line. This is pt barrier to discharge. Care Coordinator helping with securing home vancomycin and home cares.   -PTA Lovenox 80 mg SQ BID  -Patient not a good candidate for Eliquis due to his poor GI absorption since getting gastric bypass (2003)  - ID recs appreciated   > RONEL tomorrow   > Continue vanc for 4wk duration  - Appreciate help of Care Coordinators (see note)     #Short gut syndrome  #Malabsorption requiring TPN  Complication of Celestino-en-Y gastric bypass surgery (2003)   -Resumed home Oxycodone & Fentanyl patch for pain control  -Pharm on consult for TPN management via PIV     Chronic/Stable Conditions     #Paroxysmal atrial fibrillation  Patient reports that he has a history of random \"palpitations\" that have been going for several decades.  HR < 100. Last EKG noted " "NSR  -Resumed home Coreg for rate control   -Resumed home Lovenox for AC     #Essential Hypertension  -Resumed home Coreg     #ADHD  -Resumed home adderall     #Macrocytic anemia  Likely from Vit B12 deficiency 2/2 poor GI absorption  -Receives monthly Cyanocobalamin 1000 mcg IM     #Anxiety disorder  -Resumed home ativan     #Tobacco use  -Started nicotine patches 7mg/24hr     Diet: parenteral nutrition - Clinimix E  parenteral nutrition - Clinimix E  Regular Diet Adult    DVT Prophylaxis: DOAC  Sanchez Catheter: Not present  Fluids: PO  Lines: None     Cardiac Monitoring: None  Code Status: Full Code      Clinically Significant Risk Factors                         # Overweight: Estimated body mass index is 26.97 kg/m  as calculated from the following:    Height as of this encounter: 1.803 m (5' 11\").    Weight as of this encounter: 87.7 kg (193 lb 6.4 oz)., PRESENT ON ADMISSION          Disposition Plan      Expected Discharge Date: 07/08/2023      Destination: home with family          The patient's care was discussed with the Attending Physician, Dr. Shanae Gonzalez.    Rush Chairez MD  Medicine Service, [facility] TEAM [address] [place] [address]  Securely message with [employer] (more info)  Text page via [facility] Paging/Directory   See signed in provider for up to date coverage information  ______________________________________________________________________    Interval History   NAEON. Pt ready to leave and is agreeable to wait until all of plan in place for safe discharge.     Physical Exam   Vital Signs: Temp: 98.2  F (36.8  C) Temp src: Oral BP: 118/84 Pulse: 68   Resp: 18 SpO2: 98 % O2 Device: None (Room air) Oxygen Delivery: 2 LPM  Weight: 193 lbs 6.4 oz    Constitutional: awake  Eyes: vision intact  ENT: atramatic  Hematologic / Lymphatic: no cervical lymphadenopathy and no supraclavicular lymphadenopathy  Respiratory: Clear to auscultation BL  Cardiovascular: Normal S1/S2 " with no murmurs, rub, or gallops   GI: Non-distended, normal bowel sounds, non-tender  Skin: no bruising or bleeding and normal skin color, texture, turgor  Musculoskeletal: LUE edema from forearm distally with slight erythema and tenderness on palpation improved   Neurologic: Awake, alert, oriented to name, place and time.       Medical Decision Making       Please see A&P for additional details of medical decision making.      Data     I have personally reviewed the following data over the past 24 hrs:    N/A  \   N/A   / N/A     141 108 (H) 15.4 /  112 (H)   3.8 25 0.62 (L) \       Imaging results reviewed over the past 24 hrs:   Recent Results (from the past 24 hour(s))   Transesophageal Echocardiogram   Result Value    LVEF  55-60%    Narrative    [id number]  [facility]  [id number]  [id number]^HERLINDA^INO     [facility],[place]  Echocardiography Laboratory  [address] [place] [id number]     Name: SARA HASSAN  MRN: [id number]  : 1970  Study Date: 2023 12:52 PM  Age: 52 yrs  Gender: Male  Patient Location: [facility]  Reason For Study: Endocarditis  History: IVDA, MRSA bacteremia  Ordering Physician: INO PATE     BSA: 2.1 m2  Height: 71 in  Weight: 193 lb  HR: 50  BP: 120/80 mmHg  ______________________________________________________________________________  Interpretation Summary  No vegetation or mass identified.     All four valves are normal in structure and function.     ______________________________________________________________________________  Procedure  Transesophageal Echocardiogram with color and spectral Doppler performed.  Procedure location Operating Room. Informed consent for Transesophegeal echo  obtained. RONEL Probe #61 was used during the procedure. Sedation, endotracheal  intubation, and mechanical ventilation were initiated prior to the RONEL and  were monitored by anesthesia. The Transducer was inserted without difficulty .  The patient  tolerated the procedure well. Complications None. The patient's  rhythm is normal sinus. Good quality two-dimensional was performed and  interpreted.     Left Ventricle  Global and regional left ventricular function is normal with an EF of 55-60%.  Left ventricular size is normal.     Right Ventricle  Right ventricular function, chamber size, wall motion, and thickness are  normal.     Atria  Both atria appear normal. The left atrial appendage is normal. It is free of  spontaneous echo contrast and thrombus. A small patent foramen ovale is  present. The patent foramen ovale was demonstrated by color Doppler .     Mitral Valve  The mitral valve is normal. Trace mitral insufficiency is present.     Aortic Valve  Aortic valve is normal in structure and function.     Tricuspid Valve  The tricuspid valve is normal.     Pulmonic Valve  The pulmonic valve is normal. Trace pulmonic insufficiency is present.     Vessels  The aorta root is normal. The thoracic aorta is normal.     Pericardium  No pericardial effusion is present.     ______________________________________________________________________________  Report approved by: Shawanda COHEN 07/07/2023 01:42 PM     ______________________________________________________________________________       Upper Extremity Venous Duplex Right   Result Value    Radiologist flags Nonocclusive DVT (Urgent)    Narrative    EXAMINATION: DOPPLER VENOUS ULTRASOUND OF THE RIGHT UPPER EXTREMITY,  7/7/2023 4:00 PM     COMPARISON: Correlation with ultrasound 7/3/2023.    HISTORY: DVT and right subclavian present for PICC placement?    TECHNIQUE:  Gray-scale evaluation with compression, spectral flow and  color Doppler assessment of the deep venous system of the right upper  extremity.    FINDINGS:  Right: The right internal jugular vein is partially compressible with  visible echogenic thrombus. The proximal right subclavian vein is  partially compressible. Additional partially occlusive  echogenic  thrombus seen within the mid right subclavian vein. Normal blood flow  and waveforms are demonstrated in the innominate and axillary veins.  There is normal compressibility of the brachial, basilic and cephalic  veins.      Impression    IMPRESSION:  Nonocclusive deep venous thrombus is seen within the mid and proximal  right subclavian vein and right internal jugular vein.    [Urgent Result: Nonocclusive DVT]    Finding was identified on 7/7/2023 3:59 PM.     Rush Monteiro M.D. was contacted by Dr. Roberts at 7/7/2023 4:05  PM and verbalized understanding of the urgent finding.     I have personally reviewed the examination and initial interpretation  and I agree with the findings.    VY ARMAS MD         SYSTEM ID:  [id number]

## 2023-07-07 NOTE — PLAN OF CARE
"Goal Outcome Evaluation:  1900-0700: Pt is A&Ox4. VSS on RA, intermittent HTN within parameters. Oxy x2 for pain. Zofran x1 for nausea, no emesis. No complaints of SOB. Continues on PPN, intermittently paused d//t going outside. Continually educated on importance of PPN and risks of going outside. Continues on BID vanco. Compliant to cares. Requires encouragement to take premed of benadryl. Has been NPO since midnight for RONEL scheduled at 1pm in OR today. Using biotene spray PRN for dry mouth. BG at 0000 of 106 and 0600 of 133    Pt returned up to floor around 0030 after taking a break outside. When RN approached to reconnect pt to PPN, RN found pt staring blankly out the window and speaking more garbled than before. Became more agitated when asked to do things, and refusing to be reconnecting. When asked why, Parker stated \"he didn't feel well\". RN asked if she could get him anything. Parker responded, \"Can you leave me alone, I don't want to be connected.\". Another RN attempted without success. Provider notified, and educated at bedside. Was compliant to reconnect \"in an hour\". Completed. Parker has been compliant since.      Parker is very adamant that he is leaving today 7/7 at 1:15PM.     "

## 2023-07-07 NOTE — PROGRESS NOTES
Bariatric surgery  Brief prog note  7/7/2023    Notified by pt wife that pt in hospital. Was due for g-tube exchange.    Exchanged g-tube at bedside. 16fr Billy 2.0 g-tube placed.     Pt tolerated procedure well.     Nicolette Li MD  Surgery PGY-4

## 2023-07-07 NOTE — PROVIDER NOTIFICATION
Dr Garrison paged: Pt needs a patient care order for ok to be disconnected from PPN to go outside.  Can you pls place?  Thanks!

## 2023-07-07 NOTE — PROVIDER NOTIFICATION
Jose Eduardo Garrison, Ronen Campos MD @[phone] via vocera chat    Pt left unit and was disconnected from PPN (ok per orders). Once returned, pt is a lot more aggravated and non compliant, speech is more garbled and he is refusing to be reconnected to his PPN. Please advise.     Plan: Provider to come see him at bedside.

## 2023-07-07 NOTE — PHARMACY-VANCOMYCIN DOSING SERVICE
Pharmacy Vancomycin Note  Date of Service 2023  Patient's  1970   52 year old, male    Indication: Bacteremia  Day of Therapy: Day 4  Current vancomycin regimen:  1250 mg IV q12h  Current vancomycin monitoring method: AUC  Current vancomycin therapeutic monitoring goal: 400-600 mg*h/L    InsightRX Prediction of Current Vancomycin Regimen  Loading dose: N/A  Regimen: 1250 mg IV every 12 hours.  Start time: 22:51 on 2023  Exposure target: AUC24 (range)400-600 mg/L.hr   AUC24,ss: 364 mg/L.hr  Probability of AUC24 > 400: 31 %  Ctrough,ss: 8.9 mg/L  Probability of Ctrough,ss > 20: 0 %  Probability of nephrotoxicity (Lodise CARMELA ): 5 %    Current estimated CrCl = Estimated Creatinine Clearance: 176.8 mL/min (A) (based on SCr of 0.62 mg/dL (L)).    Creatinine for last 3 days  2023:  3:56 PM Creatinine 0.69 mg/dL  2023:  6:27 AM Creatinine 0.71 mg/dL  2023:  5:40 AM Creatinine 0.76 mg/dL  2023:  6:06 AM Creatinine 0.62 mg/dL    Recent Vancomycin Levels (past 3 days)  2023:  6:06 AM Vancomycin 8.9 ug/mL    Vancomycin IV Administrations (past 72 hours)                   vancomycin (VANCOCIN) 1,250 mg in 0.9% NaCl 250 mL intermittent infusion (mg) 1,250 mg New Bag 23 1051      Restarted 23 2205     1,250 mg New Bag  203      Restarted  0829     1,250 mg New Bag  0635     1,250 mg New Bag 23 1821     1,250 mg New Bag  0602    vancomycin (VANCOCIN) 2,000 mg in sodium chloride 0.9 % 500 mL intermittent infusion (mg) 2,000 mg New Bag 23 1735                Nephrotoxins and other renal medications (From now, onward)    Start     Dose/Rate Route Frequency Ordered Stop    23 0600  [Auto Hold]  vancomycin (VANCOCIN) 1,250 mg in 0.9% NaCl 250 mL intermittent infusion        (Auto Hold since 2023 at 1201.Hold Reason: Transfer to a procedural area)    1,250 mg  over 90 Minutes Intravenous EVERY 12 HOURS 23 1607               Contrast Orders - past  72 hours (72h ago, onward)    None          Interpretation of levels and current regimen:  Vancomycin level is reflective of AUC less than 400    Has serum creatinine changed greater than 50% in last 72 hours: No    Urine output:  unable to determine    Renal Function: Stable    InsightRX Prediction of Planned New Vancomycin Regimen  Loading dose: N/A  Regimen: 1500 mg IV every 12 hours.  Start time: 22:51 on 07/07/2023  Exposure target: AUC24 (range)400-600 mg/L.hr   AUC24,ss: 437 mg/L.hr  Probability of AUC24 > 400: 68 %  Ctrough,ss: 10.9 mg/L  Probability of Ctrough,ss > 20: 1 %  Probability of nephrotoxicity (Lodise CARMELA 2009): 6 %    Plan:  1. Increase Dose to 1500 mg q12h  2. Vancomycin monitoring method: AUC  3. Vancomycin therapeutic monitoring goal: 400-600 mg*h/L  4. Pharmacy will check vancomycin levels as appropriate in 1-3 Days.  5. Serum creatinine levels will be ordered daily for the first week of therapy and at least twice weekly for subsequent weeks.    Perla Marino [facility], Pharmacy Resident

## 2023-07-07 NOTE — PROGRESS NOTES
Patient has been educated on potential risks of choosing to leave the unit and that the responsibility for patient well-being will belong to the patient. Pt has been informed that admission to hospital is due to need for medical treatment. Education given to the patient on some of the potential risks included but are not limited to:      - lack of access to nursing intervention      - possible missed appointments with MD, therapies, tests      - possible missed medications, antibiotics, management of IV's     Patient Response: Acceptance     Patient notified staff prior to leaving unit: Yes  Coban wrap placed over IV prior to pt leaving unit Yes       Writer paused and disconnected PPN prior to pt leaving the unit. Pt care order in place that this is ok.

## 2023-07-07 NOTE — PROGRESS NOTES
CLINICAL NUTRITION SERVICES - BRIEF NOTE     Nutrition Prescription    RECOMMENDATIONS FOR MDs/PROVIDERS TO ORDER:  None at this time.     Recommendations already ordered by Registered Dietitian (RD):  Continue with current PPN plan. Only once central access obtained, see below for TPN recommendations.     Future/Additional Recommendations:  Monitor weight trends and TPN vs PPN advancement.      Findings  Writer paged provider to discuss current plan with wether patient will be leaving with PPN Vs TPN. Provider stated that they are waiting for recommendations from other staff in regards to infection and when central line can be placed. Writer will provide TPN recommendations for future use should patient be able to discharge, per pt preference, today.     INTERVENTIONS  Implementation  TPN recommendations, ONLY TO BE USED WITH CENTRAL ACCESS.   1. Dosing weight: 86 kg  2. Access: Only once central access confirmed.    3. Initial parameters (per day)  Volume:  2155 mL or per PharmD.  Dextrose: 130 g  AA: 100 g  Lipids: 250 mL 20%, 7 days per week     4. Dextrose titration:   Monitor lytes and if within acceptable parameters (Mg++ > or = 1.5, K+ is > or = 3, and PO4 > or = 1.9), increase dextrose by 60 g/day (increase by 65 g for last advancement) to goal of 375 g dextrose.    5. Additives: Standard trace elements and MVI     Goal PN provides 375 g dextrose, 100 g AA, and 250 mL 20% lipids 7 days per week for total provision of 2175 Kcals (25 Kcals/kg), 1.16 g/kg protein, GIR 3 mg/kg/minute when utilizing continuous infusion, and 23% fat kcals on average daily.      Follow up/Monitoring  Will continue to follow up per protocol.     Geraldine Oquendo RD, LD   6A/7D pager [phone]  Weekend pager [phone]

## 2023-07-08 LAB
ANION GAP SERPL CALCULATED.3IONS-SCNC: 9 MMOL/L (ref 7–15)
BACTERIA SPEC CULT: ABNORMAL
BUN SERPL-MCNC: 8.2 MG/DL (ref 6–20)
CALCIUM SERPL-MCNC: 8.3 MG/DL (ref 8.6–10)
CHLORIDE SERPL-SCNC: 105 MMOL/L (ref 98–107)
CREAT SERPL-MCNC: 0.71 MG/DL (ref 0.67–1.17)
DEPRECATED HCO3 PLAS-SCNC: 25 MMOL/L (ref 22–29)
ERYTHROCYTE [DISTWIDTH] IN BLOOD BY AUTOMATED COUNT: 15.7 % (ref 10–15)
GFR SERPL CREATININE-BSD FRML MDRD: >90 ML/MIN/1.73M2
GLUCOSE BLDC GLUCOMTR-MCNC: 104 MG/DL (ref 70–99)
GLUCOSE BLDC GLUCOMTR-MCNC: 131 MG/DL (ref 70–99)
GLUCOSE SERPL-MCNC: 105 MG/DL (ref 70–99)
HCT VFR BLD AUTO: 32.4 % (ref 40–53)
HGB BLD-MCNC: 9.9 G/DL (ref 13.3–17.7)
HOLD SPECIMEN: NORMAL
MAGNESIUM SERPL-MCNC: 1.8 MG/DL (ref 1.7–2.3)
MCH RBC QN AUTO: 29.6 PG (ref 26.5–33)
MCHC RBC AUTO-ENTMCNC: 30.6 G/DL (ref 31.5–36.5)
MCV RBC AUTO: 97 FL (ref 78–100)
PHOSPHATE SERPL-MCNC: 3.3 MG/DL (ref 2.5–4.5)
PLATELET # BLD AUTO: 202 10E3/UL (ref 150–450)
POTASSIUM SERPL-SCNC: 3.5 MMOL/L (ref 3.4–5.3)
RBC # BLD AUTO: 3.35 10E6/UL (ref 4.4–5.9)
SODIUM SERPL-SCNC: 139 MMOL/L (ref 136–145)
WBC # BLD AUTO: 11.6 10E3/UL (ref 4–11)

## 2023-07-08 PROCEDURE — 99231 SBSQ HOSP IP/OBS SF/LOW 25: CPT | Mod: GC | Performed by: STUDENT IN AN ORGANIZED HEALTH CARE EDUCATION/TRAINING PROGRAM

## 2023-07-08 PROCEDURE — 999N000248 HC STATISTIC IV INSERT WITH US BY RN

## 2023-07-08 PROCEDURE — 250N000011 HC RX IP 250 OP 636

## 2023-07-08 PROCEDURE — 36415 COLL VENOUS BLD VENIPUNCTURE: CPT | Performed by: STUDENT IN AN ORGANIZED HEALTH CARE EDUCATION/TRAINING PROGRAM

## 2023-07-08 PROCEDURE — 85027 COMPLETE CBC AUTOMATED: CPT

## 2023-07-08 PROCEDURE — 83735 ASSAY OF MAGNESIUM: CPT | Performed by: STUDENT IN AN ORGANIZED HEALTH CARE EDUCATION/TRAINING PROGRAM

## 2023-07-08 PROCEDURE — 80048 BASIC METABOLIC PNL TOTAL CA: CPT | Performed by: STUDENT IN AN ORGANIZED HEALTH CARE EDUCATION/TRAINING PROGRAM

## 2023-07-08 PROCEDURE — 250N000013 HC RX MED GY IP 250 OP 250 PS 637

## 2023-07-08 PROCEDURE — 258N000003 HC RX IP 258 OP 636

## 2023-07-08 PROCEDURE — 120N000002 HC R&B MED SURG/OB UMMC

## 2023-07-08 PROCEDURE — 250N000013 HC RX MED GY IP 250 OP 250 PS 637: Performed by: STUDENT IN AN ORGANIZED HEALTH CARE EDUCATION/TRAINING PROGRAM

## 2023-07-08 PROCEDURE — 84100 ASSAY OF PHOSPHORUS: CPT | Performed by: STUDENT IN AN ORGANIZED HEALTH CARE EDUCATION/TRAINING PROGRAM

## 2023-07-08 RX ORDER — FENTANYL 25 UG/1
25 PATCH TRANSDERMAL
Status: DISCONTINUED | OUTPATIENT
Start: 2023-07-08 | End: 2023-07-11 | Stop reason: HOSPADM

## 2023-07-08 RX ORDER — LIDOCAINE 4 G/G
1-2 PATCH TOPICAL
Status: DISCONTINUED | OUTPATIENT
Start: 2023-07-08 | End: 2023-07-11 | Stop reason: HOSPADM

## 2023-07-08 RX ADMIN — SUCRALFATE 1 G: 1 SUSPENSION ORAL at 17:03

## 2023-07-08 RX ADMIN — ENOXAPARIN SODIUM 80 MG: 80 INJECTION SUBCUTANEOUS at 09:06

## 2023-07-08 RX ADMIN — DIPHENHYDRAMINE HYDROCHLORIDE 50 MG: 25 SOLUTION ORAL at 20:26

## 2023-07-08 RX ADMIN — NYSTATIN: 100000 CREAM TOPICAL at 09:06

## 2023-07-08 RX ADMIN — FENTANYL 1 PATCH: 25 PATCH TRANSDERMAL at 10:13

## 2023-07-08 RX ADMIN — VANCOMYCIN HYDROCHLORIDE 1500 MG: 10 INJECTION, POWDER, LYOPHILIZED, FOR SOLUTION INTRAVENOUS at 21:43

## 2023-07-08 RX ADMIN — OXYCODONE HYDROCHLORIDE 10 MG: 5 SOLUTION ORAL at 04:16

## 2023-07-08 RX ADMIN — ACETAMINOPHEN 650 MG: 325 SOLUTION ORAL at 12:50

## 2023-07-08 RX ADMIN — ONDANSETRON 4 MG: 4 TABLET, ORALLY DISINTEGRATING ORAL at 04:16

## 2023-07-08 RX ADMIN — CARVEDILOL 6.25 MG: 6.25 TABLET, FILM COATED ORAL at 17:03

## 2023-07-08 RX ADMIN — SUCRALFATE 1 G: 1 SUSPENSION ORAL at 09:03

## 2023-07-08 RX ADMIN — DIPHENHYDRAMINE HYDROCHLORIDE 50 MG: 25 SOLUTION ORAL at 09:05

## 2023-07-08 RX ADMIN — OXYCODONE HYDROCHLORIDE 10 MG: 5 SOLUTION ORAL at 21:50

## 2023-07-08 RX ADMIN — NYSTATIN: 100000 CREAM TOPICAL at 20:27

## 2023-07-08 RX ADMIN — VANCOMYCIN HYDROCHLORIDE 1500 MG: 10 INJECTION, POWDER, LYOPHILIZED, FOR SOLUTION INTRAVENOUS at 10:09

## 2023-07-08 RX ADMIN — DEXTROAMPHETAMINE SACCHARATE, AMPHETAMINE ASPARTATE, DEXTROAMPHETAMINE SULFATE, AND AMPHETAMINE SULFATE 20 MG: 2.5; 2.5; 2.5; 2.5 TABLET ORAL at 09:04

## 2023-07-08 RX ADMIN — OXYCODONE HYDROCHLORIDE 10 MG: 5 SOLUTION ORAL at 15:00

## 2023-07-08 RX ADMIN — ONDANSETRON 4 MG: 4 TABLET, ORALLY DISINTEGRATING ORAL at 15:00

## 2023-07-08 RX ADMIN — SUCRALFATE 1 G: 1 SUSPENSION ORAL at 12:46

## 2023-07-08 RX ADMIN — OXYCODONE HYDROCHLORIDE 10 MG: 5 SOLUTION ORAL at 10:13

## 2023-07-08 RX ADMIN — LORAZEPAM 1 MG: 0.5 TABLET ORAL at 20:26

## 2023-07-08 RX ADMIN — CARVEDILOL 6.25 MG: 6.25 TABLET, FILM COATED ORAL at 09:04

## 2023-07-08 RX ADMIN — ENOXAPARIN SODIUM 80 MG: 80 INJECTION SUBCUTANEOUS at 21:53

## 2023-07-08 ASSESSMENT — ACTIVITIES OF DAILY LIVING (ADL)
ADLS_ACUITY_SCORE: 26

## 2023-07-08 NOTE — PLAN OF CARE
Goal Outcome Evaluation:  [id number]  AVSS, alert and oriented x 4.  C/o pain, given oxycodone x 2. Nasuea intermittent, given po Zofran with relief. Pt is allergic to iv vancomycin, getting premeds benadryl prior to vancomycin. Pt is still refusing to infuse PPN from this morning.. MD is aware. BG Q 6 hrs, BG @ 2200 is 94  Have order for PICC line. Possibly pt is discharging home with PICC for PPN infusion. Up independent, voiding adequately, LBM 7/3 per pt wife.  Continue to monitor care.

## 2023-07-08 NOTE — PROGRESS NOTES
"[facility]    Progress Note - Medicine Service, LINA TEAM 5       Date of Admission:  7/4/2023    Assessment & Plan   Parker Acevedo is a 52 year old male admitted on 7/4/2023 for left subclavian DVT, possible concurrent cellulitis, and a recent untreated MRSE bacteremia - all most likely caused by his exisitng PICC line . He has a history of catheter-associated infections and venous occlusions.     Updates:  - ID recs appreciated  - Will discuss with IR for procedure possibly Monday    Of Note: See 7/6 progress note for pt experience and room search.      #Possible LUE nonpurulent cellulitis  #Septic Thrombus Left Subclavian Vein, Likely  #Recent MRSE bacteremia  #Hx of Cm catheter-associated polymicrobial bacteremia (06/2023)  #Hx of fungemia  Blood cx's from [facility] grew methicillin-resistant Staph Epidermidis. Believe these infections were also provoked by his existing PICC line and poor line care. Blood cultures negative within this recent 48hr period. Pt unable to have PICC placed d/t right and left subclavian DVT's. Will need radiology to place line. This is pt barrier to discharge. Care Coordinator helping with securing home vancomycin and home cares.   -PTA Lovenox 80 mg SQ BID  -Patient not a good candidate for Eliquis due to his poor GI absorption since getting gastric bypass (2003)  - ID recs appreciated   > RONEL and TTE negative   > Continue vanc for 4wk duration (stop date 8/2)  - Appreciate help of Care Coordinators (see note)     #Short gut syndrome  #Malabsorption requiring TPN  Complication of Celestino-en-Y gastric bypass surgery (2003)   -Resumed home Oxycodone & Fentanyl patch for pain control  -Pharm on consult for TPN management via PIV     Chronic/Stable Conditions     #Paroxysmal atrial fibrillation  Patient reports that he has a history of random \"palpitations\" that have been going for several decades.  HR < 100. Last EKG noted " "NSR  -Resumed home Coreg for rate control   -Resumed home Lovenox for AC     #Essential Hypertension  -Resumed home Coreg     #ADHD  -Resumed home adderall     #Macrocytic anemia  Likely from Vit B12 deficiency 2/2 poor GI absorption  -Receives monthly Cyanocobalamin 1000 mcg IM     #Anxiety disorder  -Resumed home ativan     #Tobacco use  -Started nicotine patches 7mg/24hr     Diet: parenteral nutrition - Clinimix E  Regular Diet Adult    DVT Prophylaxis: DOAC  Sanchez Catheter: Not present  Fluids: PO  Lines: None     Cardiac Monitoring: None  Code Status: Full Code      Clinically Significant Risk Factors                         # Overweight: Estimated body mass index is 26.6 kg/m  as calculated from the following:    Height as of this encounter: 1.803 m (5' 11\").    Weight as of this encounter: 86.5 kg (190 lb 11.2 oz)., PRESENT ON ADMISSION          Disposition Plan      Expected Discharge Date: 07/08/2023      Destination: home with family          The patient's care was discussed with the Attending Physician, Dr. Shanae Gonzalez.    Rush Chairez MD  Medicine Service, [address]  Securely message with [employer] (more info)  Text page via [employer] Paging/Directory   See signed in provider for up to date coverage information  ______________________________________________________________________    Interval History   NAEON. Pt agreeable to staying until after catheter placement on Monday. Doing a lot better today with mood.     Physical Exam   Vital Signs: Temp: 98.8  F (37.1  C) Temp src: Oral BP: 104/58 Pulse: 60   Resp: 16 SpO2: 97 % O2 Device: None (Room air) Oxygen Delivery: 2 LPM  Weight: 190 lbs 11.17 oz    Constitutional: awake  Eyes: vision intact  ENT: atramatic  Hematologic / Lymphatic: no cervical lymphadenopathy and no supraclavicular lymphadenopathy  Respiratory: Clear to auscultation BL  Cardiovascular: Normal S1/S2 with no murmurs, rub, " or gallops   GI: Non-distended, normal bowel sounds, non-tender  Skin: no bruising or bleeding and normal skin color, texture, turgor  Musculoskeletal: LUE edema from forearm distally with slight erythema and tenderness on palpation continuing to improve with less pain on palpation  Neurologic: Awake, alert, oriented to name, place and time.       Medical Decision Making       Please see A&P for additional details of medical decision making.      Data     I have personally reviewed the following data over the past 24 hrs:    11.6 (H)  \   9.9 (L)   / 202     139 105 8.2 /  105 (H)   3.5 25 0.71 \       Imaging results reviewed over the past 24 hrs:   Recent Results (from the past 24 hour(s))   Transesophageal Echocardiogram   Result Value    LVEF  55-60%    Narrative    [id number]  [facility]  [id number]  [id number]^HERLINDA^INO     [facility]  Echocardiography Laboratory  [address] [place] [address]     Name: SARA HASSAN  MRN: [id number]  : 1970  Study Date: 2023 12:52 PM  Age: 52 yrs  Gender: Male  Patient Location: [facility]  Reason For Study: Endocarditis  History: IVDA, MRSA bacteremia  Ordering Physician: INO PATE     BSA: 2.1 m2  Height: 71 in  Weight: 193 lb  HR: 50  BP: 120/80 mmHg  ______________________________________________________________________________  Interpretation Summary  No vegetation or mass identified.     All four valves are normal in structure and function.     ______________________________________________________________________________  Procedure  Transesophageal Echocardiogram with color and spectral Doppler performed.  Procedure location Operating Room. Informed consent for Transesophegeal echo  obtained. RONEL Probe #[id number] was used during the procedure. Sedation, endotracheal  intubation, and mechanical ventilation were initiated prior to the RONEL and  were monitored by anesthesia. The Transducer was inserted without  difficulty .  The patient tolerated the procedure well. Complications None. The patient's  rhythm is normal sinus. Good quality two-dimensional was performed and  interpreted.     Left Ventricle  Global and regional left ventricular function is normal with an EF of 55-60%.  Left ventricular size is normal.     Right Ventricle  Right ventricular function, chamber size, wall motion, and thickness are  normal.     Atria  Both atria appear normal. The left atrial appendage is normal. It is free of  spontaneous echo contrast and thrombus. A small patent foramen ovale is  present. The patent foramen ovale was demonstrated by color Doppler .     Mitral Valve  The mitral valve is normal. Trace mitral insufficiency is present.     Aortic Valve  Aortic valve is normal in structure and function.     Tricuspid Valve  The tricuspid valve is normal.     Pulmonic Valve  The pulmonic valve is normal. Trace pulmonic insufficiency is present.     Vessels  The aorta root is normal. The thoracic aorta is normal.     Pericardium  No pericardial effusion is present.     ______________________________________________________________________________  Report approved by: Shawnada COHEN 07/07/2023 01:42 PM     ______________________________________________________________________________      US Upper Extremity Venous Duplex Right   Result Value    Radiologist flags Nonocclusive DVT (Urgent)    Narrative    EXAMINATION: DOPPLER VENOUS ULTRASOUND OF THE RIGHT UPPER EXTREMITY,  7/7/2023 4:00 PM     COMPARISON: Correlation with ultrasound 7/3/2023.    HISTORY: DVT and right subclavian present for PICC placement?    TECHNIQUE:  Gray-scale evaluation with compression, spectral flow and  color Doppler assessment of the deep venous system of the right upper  extremity.    FINDINGS:  Right: The right internal jugular vein is partially compressible with  visible echogenic thrombus. The proximal right subclavian vein is  partially compressible.  Additional partially occlusive echogenic  thrombus seen within the mid right subclavian vein. Normal blood flow  and waveforms are demonstrated in the innominate and axillary veins.  There is normal compressibility of the brachial, basilic and cephalic  veins.      Impression    IMPRESSION:  Nonocclusive deep venous thrombus is seen within the mid and proximal  right subclavian vein and right internal jugular vein.    [Urgent Result: Nonocclusive DVT]    Finding was identified on 7/7/2023 3:59 PM.     Rush Monteiro M.D. was contacted by Dr. Roberts at 7/7/2023 4:05  PM and verbalized understanding of the urgent finding.     I have personally reviewed the examination and initial interpretation  and I agree with the findings.    VY ARMAS MD         SYSTEM ID:  [id number]

## 2023-07-08 NOTE — PLAN OF CARE
"Goal Outcome Evaluation:       Time: 9025-6615    /63 (BP Location: Left arm)   Pulse 87   Temp 100.2  F (37.9  C) (Oral)   Resp 16   Ht 1.803 m (5' 11\")   Wt 87.7 kg (193 lb 6.4 oz)   SpO2 100%   BMI 26.97 kg/m      Reason for admission: LUE cellulitis  Activity: UAL  Pain: Generalized, fentanyl patch in place, PRN oxy given x1  Neuro: A&Ox4  Cardiac: WDL  Respiratory: WDL  GI/: LBM 7/3, voiding spontaneously. Intermittent nausea, PRN zofran given x1  Diet: Regular  Lines: Right PIV-SL  Labs/imaging: Tmax 100.2. Patient refused AM POCT glucose. AM labs pending.      New changes this shift: Patient requested fentanyl patch replaced every 48 hours instead of 72-states this is his home regimen.     Plan: PICC placement and discharge home.      Continue to monitor and follow POC                  "

## 2023-07-08 NOTE — PLAN OF CARE
[id number]  VSS on RA. A&Ox4. Reports mild pain, PRN oxy given x1. Denies N/V, SOB. Voiding spontaneously. UAL. Continues on IV vanco, new IV placed in left lower cornejo. Plan for azevedo placement and discharge on Monday. Continue w/ POC.

## 2023-07-08 NOTE — CONSULTS
INTERVENTIONAL RADIOLOGY CONSULT NOTE    Reason for referral:   Request for tunneled Cm placement.    History:   Patient is a 52-year-old male with past medical history of short gut syndrome requiring TPN, right IJ occlusion, admitted on 7/4/2023 for left subclavian DVT with possible concurrent cellulitis and recent untreated MRSA bacteremia, likely related to existing PICC line (subsequently removed.) Patient is currently dependent on peripheral access for TPN and vancomycin.    Assessment:   Patient is a 52-year-old male with past medical history of short gut syndrome dep dependent on TPN, right IJ occlusion, admitted on 7/4/2023 for left subclavian DVT with concomitant cellulitis and recent untreated MRSA bacteremia, likely catheter associated.    Plan:   Patient will be discussed on Monday for placement of tunneled Cm catheter.  -NPO 11:59 7/9/2023 (ordered)  -labs WNL for procedure  -anticoagulation does not need to be held.      Discussed with Dr. Williamson.  Odalys Hankins DO.   Diagnostic/Interventional Radiology PGY-5  IR pass pager: [phone]    I reviewed all pertinent data and agree with the assessment and plan documented by Dr. Hankins.    Natalie Church MD  Interventional Radiology   Pager [phone]    ----------------------------------------------  Imaging:   Results for orders placed or performed during the hospital encounter of 07/04/23   XR Hand Left G/E 3 Views    Impression    IMPRESSION: Soft tissue swelling in the metacarpal region, wrist and distal forearm. No soft tissue emphysema. No fractures are evident. Degenerative changes in the first CMC joint.   XR Forearm Left 2 Views    Impression    IMPRESSION: Soft tissue swelling in the forearm has increased. No soft tissue fractures.   US Upper Extremity Venous Duplex Right   Result Value Ref Range    Radiologist flags Nonocclusive DVT (Urgent)     Impression    IMPRESSION:  Nonocclusive deep venous thrombus is seen within the mid  and proximal  right subclavian vein and right internal jugular vein.    [Urgent Result: Nonocclusive DVT]    Finding was identified on 7/7/2023 3:59 PM.     Rush Monteiro M.D. was contacted by Dr. Roberts at 7/7/2023 4:05  PM and verbalized understanding of the urgent finding.     I have personally reviewed the examination and initial interpretation  and I agree with the findings.    VY ARMAS MD         SYSTEM ID:  [id number]   Echo Complete   Result Value Ref Range    LVEF  60-65%    Transesophageal Echocardiogram   Result Value Ref Range    LVEF  55-60%        Labs:  Lab Results   Component Value Date    HGB 9.9 07/08/2023    HGB 12.1 06/29/2021     Lab Results   Component Value Date     07/08/2023     06/29/2021     Lab Results   Component Value Date    WBC 11.6 07/08/2023    WBC 6.9 06/29/2021       Lab Results   Component Value Date    INR 1.13 07/06/2023    INR 1.07 05/07/2021       Lab Results   Component Value Date    PROTTOTAL 6.8 07/06/2023    PROTTOTAL 6.7 06/29/2021      Lab Results   Component Value Date    ALBUMIN 3.9 07/06/2023    ALBUMIN 3.2 12/13/2022    ALBUMIN 3.6 06/29/2021     Lab Results   Component Value Date    BILITOTAL 0.5 07/06/2023    BILITOTAL 0.7 06/29/2021     Lab Results   Component Value Date    BILICONJ 0.0 07/15/2012      Lab Results   Component Value Date    ALKPHOS 69 07/06/2023    ALKPHOS 61 06/29/2021     Lab Results   Component Value Date    AST 17 07/06/2023    AST 17 06/29/2021     Lab Results   Component Value Date    ALT 14 07/06/2023    ALT 24 06/29/2021       Lab Results   Component Value Date    CR 0.71 07/08/2023    CR 0.69 06/29/2021     Lab Results   Component Value Date    BUN 8.2 07/08/2023    BUN 17 12/13/2022    BUN 11 06/29/2021

## 2023-07-08 NOTE — PLAN OF CARE
Goal Outcome Evaluation:  Given Oxycodone and Tylenol for generalized pain with some relief. Afebrile. Denied nausea. Up independently. Plan is to place a Cm catheter and then discharge on Monday.

## 2023-07-09 LAB
AMPHET UR-MCNC: >5000 NG/ML
ANION GAP SERPL CALCULATED.3IONS-SCNC: 9 MMOL/L (ref 7–15)
BACTERIA BLD CULT: NO GROWTH
BUN SERPL-MCNC: 6.5 MG/DL (ref 6–20)
CALCIUM SERPL-MCNC: 8.6 MG/DL (ref 8.6–10)
CHLORIDE SERPL-SCNC: 106 MMOL/L (ref 98–107)
CREAT SERPL-MCNC: 0.71 MG/DL (ref 0.67–1.17)
DEPRECATED HCO3 PLAS-SCNC: 26 MMOL/L (ref 22–29)
ERYTHROCYTE [DISTWIDTH] IN BLOOD BY AUTOMATED COUNT: 15.5 % (ref 10–15)
GFR SERPL CREATININE-BSD FRML MDRD: >90 ML/MIN/1.73M2
GLUCOSE SERPL-MCNC: 93 MG/DL (ref 70–99)
HCT VFR BLD AUTO: 35 % (ref 40–53)
HGB BLD-MCNC: 10.6 G/DL (ref 13.3–17.7)
MCH RBC QN AUTO: 29.9 PG (ref 26.5–33)
MCHC RBC AUTO-ENTMCNC: 30.3 G/DL (ref 31.5–36.5)
MCV RBC AUTO: 99 FL (ref 78–100)
MDA UR-MCNC: <200 NG/ML
MDEA UR-MCNC: <200 NG/ML
MDMA UR-MCNC: <200 NG/ML
METHAMPHET UR-MCNC: <200 NG/ML
PHENTERMINE UR CFM-MCNC: <200 NG/ML
PLATELET # BLD AUTO: 197 10E3/UL (ref 150–450)
POTASSIUM SERPL-SCNC: 3.7 MMOL/L (ref 3.4–5.3)
RBC # BLD AUTO: 3.54 10E6/UL (ref 4.4–5.9)
SODIUM SERPL-SCNC: 141 MMOL/L (ref 136–145)
VANCOMYCIN SERPL-MCNC: 8.9 UG/ML
WBC # BLD AUTO: 8.5 10E3/UL (ref 4–11)

## 2023-07-09 PROCEDURE — 80202 ASSAY OF VANCOMYCIN: CPT | Performed by: STUDENT IN AN ORGANIZED HEALTH CARE EDUCATION/TRAINING PROGRAM

## 2023-07-09 PROCEDURE — 250N000013 HC RX MED GY IP 250 OP 250 PS 637: Performed by: STUDENT IN AN ORGANIZED HEALTH CARE EDUCATION/TRAINING PROGRAM

## 2023-07-09 PROCEDURE — 36415 COLL VENOUS BLD VENIPUNCTURE: CPT | Performed by: STUDENT IN AN ORGANIZED HEALTH CARE EDUCATION/TRAINING PROGRAM

## 2023-07-09 PROCEDURE — 250N000011 HC RX IP 250 OP 636: Mod: JZ

## 2023-07-09 PROCEDURE — 85027 COMPLETE CBC AUTOMATED: CPT

## 2023-07-09 PROCEDURE — 250N000013 HC RX MED GY IP 250 OP 250 PS 637

## 2023-07-09 PROCEDURE — 258N000003 HC RX IP 258 OP 636

## 2023-07-09 PROCEDURE — 82310 ASSAY OF CALCIUM: CPT | Performed by: STUDENT IN AN ORGANIZED HEALTH CARE EDUCATION/TRAINING PROGRAM

## 2023-07-09 PROCEDURE — 120N000002 HC R&B MED SURG/OB UMMC

## 2023-07-09 PROCEDURE — 99232 SBSQ HOSP IP/OBS MODERATE 35: CPT | Performed by: STUDENT IN AN ORGANIZED HEALTH CARE EDUCATION/TRAINING PROGRAM

## 2023-07-09 PROCEDURE — 250N000011 HC RX IP 250 OP 636

## 2023-07-09 RX ADMIN — CARVEDILOL 6.25 MG: 6.25 TABLET, FILM COATED ORAL at 17:00

## 2023-07-09 RX ADMIN — ENOXAPARIN SODIUM 80 MG: 80 INJECTION SUBCUTANEOUS at 22:17

## 2023-07-09 RX ADMIN — SUCRALFATE 1 G: 1 SUSPENSION ORAL at 08:42

## 2023-07-09 RX ADMIN — OXYCODONE HYDROCHLORIDE 10 MG: 5 SOLUTION ORAL at 10:52

## 2023-07-09 RX ADMIN — LORAZEPAM 1 MG: 0.5 TABLET ORAL at 20:11

## 2023-07-09 RX ADMIN — CARVEDILOL 6.25 MG: 6.25 TABLET, FILM COATED ORAL at 08:42

## 2023-07-09 RX ADMIN — DIPHENHYDRAMINE HYDROCHLORIDE 25 MG: 25 SOLUTION ORAL at 20:11

## 2023-07-09 RX ADMIN — OXYCODONE HYDROCHLORIDE 10 MG: 5 SOLUTION ORAL at 22:17

## 2023-07-09 RX ADMIN — DEXTROAMPHETAMINE SACCHARATE, AMPHETAMINE ASPARTATE, DEXTROAMPHETAMINE SULFATE, AND AMPHETAMINE SULFATE 20 MG: 2.5; 2.5; 2.5; 2.5 TABLET ORAL at 08:42

## 2023-07-09 RX ADMIN — ONDANSETRON 4 MG: 4 TABLET, ORALLY DISINTEGRATING ORAL at 15:20

## 2023-07-09 RX ADMIN — NYSTATIN: 100000 CREAM TOPICAL at 20:12

## 2023-07-09 RX ADMIN — VANCOMYCIN HYDROCHLORIDE 1500 MG: 10 INJECTION, POWDER, LYOPHILIZED, FOR SOLUTION INTRAVENOUS at 10:54

## 2023-07-09 RX ADMIN — OXYCODONE HYDROCHLORIDE 10 MG: 5 SOLUTION ORAL at 03:04

## 2023-07-09 RX ADMIN — DIPHENHYDRAMINE HYDROCHLORIDE 25 MG: 25 SOLUTION ORAL at 08:42

## 2023-07-09 RX ADMIN — SUCRALFATE 1 G: 1 SUSPENSION ORAL at 16:56

## 2023-07-09 RX ADMIN — OXYCODONE HYDROCHLORIDE 10 MG: 5 SOLUTION ORAL at 15:20

## 2023-07-09 RX ADMIN — NYSTATIN: 100000 CREAM TOPICAL at 08:43

## 2023-07-09 RX ADMIN — ONDANSETRON 4 MG: 4 TABLET, ORALLY DISINTEGRATING ORAL at 03:07

## 2023-07-09 RX ADMIN — ONDANSETRON 4 MG: 4 TABLET, ORALLY DISINTEGRATING ORAL at 22:16

## 2023-07-09 RX ADMIN — VANCOMYCIN HYDROCHLORIDE 1500 MG: 10 INJECTION, POWDER, LYOPHILIZED, FOR SOLUTION INTRAVENOUS at 20:47

## 2023-07-09 RX ADMIN — SUCRALFATE 1 G: 1 SUSPENSION ORAL at 12:58

## 2023-07-09 RX ADMIN — DIPHENHYDRAMINE HYDROCHLORIDE 25 MG: 25 SOLUTION ORAL at 22:25

## 2023-07-09 RX ADMIN — ENOXAPARIN SODIUM 80 MG: 80 INJECTION SUBCUTANEOUS at 08:43

## 2023-07-09 ASSESSMENT — ACTIVITIES OF DAILY LIVING (ADL)
ADLS_ACUITY_SCORE: 26

## 2023-07-09 NOTE — PHARMACY-VANCOMYCIN DOSING SERVICE
Pharmacy Vancomycin Note  Date of Service 2023  Patient's  1970   52 year old, male  Actual weight: 86.6 kg    Indication: MRSE CLABSI with thrombophlebitis  Day of Therapy: 6  Current vancomycin regimen: Weight-based dosing, 1500 mg IV q12h  Current vancomycin monitoring method: AUC  Current vancomycin therapeutic monitoring goal: 400-600 mg*h/L    [employer] Prediction of Current Vancomycin Regimen  Loading dose: N/A  Regimen: 1500 mg IV every 12 hours.  Start time: 22:54 on 2023  Exposure target: AUC24 (range)400-600 mg/L.hr   AUC24,ss: 407 mg/L.hr  Probability of AUC24 > 400: 54 %  Ctrough,ss: 9.1 mg/L  Probability of Ctrough,ss > 20: 0 %  Probability of nephrotoxicity (Lodise CARMELA ): 5 %    Current estimated CrCl = Estimated Creatinine Clearance: 152.5 mL/min (based on SCr of 0.71 mg/dL).    Creatinine for last 3 days  2023:  6:06 AM Creatinine 0.62 mg/dL  2023:  5:45 AM Creatinine 0.71 mg/dL  2023:  8:18 AM Creatinine 0.71 mg/dL    Recent Vancomycin Levels (past 3 days)  2023:  6:06 AM Vancomycin 8.9 ug/mL  2023:  8:18 AM Vancomycin 8.9 ug/mL    Vancomycin IV Administrations (past 72 hours)                   vancomycin (VANCOCIN) 1,500 mg in 0.9% NaCl 250 mL intermittent infusion (mg) 1,500 mg New Bag 23 1054     1,500 mg New Bag 23     1,500 mg New Bag  1009     1,500 mg New Bag 23    vancomycin (VANCOCIN) 1,250 mg in 0.9% NaCl 250 mL intermittent infusion (mg) 1,250 mg New Bag 23 1051      Restarted 23     1,250 mg New Bag                  Nephrotoxins and other renal medications (From now, onward)    Start     Dose/Rate Route Frequency Ordered Stop    23 2030  vancomycin (VANCOCIN) 1,500 mg in 0.9% NaCl 250 mL intermittent infusion         1,500 mg  over 90 Minutes Intravenous EVERY 12 HOURS 23 1426 23 8959             Contrast Orders - past 72 hours (72h ago, onward)    None           Interpretation of levels and current regimen:  Vancomycin level is reflective of -600    Has serum creatinine changed greater than 50% in last 72 hours: No    Urine output:  unable to determine    Renal Function: Stable    Plan:  1. Continue Current Dose  2. Vancomycin monitoring method: AUC  3. Vancomycin therapeutic monitoring goal: 400-600 mg*h/L  4. Pharmacy will check vancomycin levels as appropriate in 3-5 Days.  5. Serum creatinine levels will be ordered a minimum of twice weekly.    Perla Marino [facility], Pharmacy Resident

## 2023-07-09 NOTE — PROGRESS NOTES
Chief Complaint   Patient presents with   • Follow-up     HTN, HLD, and COPD       Subjective     History of Present Illness   Zina Hameed is a 60 y.o. female presenting for follow up.  Chronic medical issues were reviewed and discussed. Patient has been watching her diet appropriately.  Patient did fall ill as a result has lost a significant amount of weight.  Since her illness, she has been able to gain a few pounds but she is 20 pounds down from her last visit.  She is feeling well otherwise.  Blood pressure has dropped.  At times she does feel dizziness with her current blood pressure medication regimen.  Chronic pain remains stable.  She continues to follow with pain management.  She reports her breathing has been stable over the last month.    The following portions of the patient's history were reviewed and updated as appropriate: allergies, current medications, past family history, past medical history, past social history, past surgical history and problem list.    Review of Systems   Constitutional: Positive for fatigue. Negative for chills and fever.   HENT: Negative for congestion, ear pain, rhinorrhea, sinus pressure and sore throat.    Eyes: Negative for visual disturbance.   Respiratory: Negative for cough, chest tightness, shortness of breath and wheezing.    Cardiovascular: Negative for chest pain, palpitations and leg swelling.   Gastrointestinal: Negative for abdominal pain, blood in stool, constipation, diarrhea, nausea and vomiting.   Endocrine: Negative for polydipsia and polyuria.   Genitourinary: Negative for dysuria and hematuria.   Musculoskeletal: Negative for arthralgias and back pain.   Skin: Negative for rash.   Neurological: Positive for light-headedness. Negative for dizziness, numbness and headaches.   Psychiatric/Behavioral: Negative for dysphoric mood and sleep disturbance. The patient is not nervous/anxious.        No Known Allergies    Past Medical History:   Diagnosis Date  [facility]    Medicine Progress Note - Medicine Service, [facility] TEAM 5    Date of Admission:  7/4/2023    Assessment & Plan   Parker Acevedo is a 52 year old male admitted on 7/4/2023 for left subclavian DVT, possible concurrent cellulitis, and a recent untreated MRSE bacteremia - all most likely caused by his exisitng PICC line . He has a history of catheter-associated infections and venous occlusions.     Updates 7/9:  - Planning for Cm placement with IR tomorrow, NPO at midnight  - Continue vancomycin  - Discontinuing PPN order as patient prefers not to get this, will resume TPN once Cm in place    Of Note: See 7/6 progress note for pt experience and room search.      #Possible LUE nonpurulent cellulitis  #Septic Thrombus Left Subclavian Vein, Likely  #Recent MRSE bacteremia  #Hx of Cm catheter-associated polymicrobial bacteremia (06/2023)  #Hx of fungemia  Blood cx's from [place] grew methicillin-resistant Staph Epidermidis. Believe these infections were also provoked by his existing PICC line and poor line care. Blood cultures negative within this recent 48hr period. Pt unable to have PICC placed d/t right and left subclavian DVT's. Will need radiology to place line. This is pt barrier to discharge. Care Coordinator helping with securing home vancomycin and home cares.   -PTA Lovenox 80 mg SQ BID  -Patient not a good candidate for Eliquis due to his poor GI absorption since getting gastric bypass (2003)  - ID recs appreciated   > RONEL and TTE negative   > Continue vanc for 4wk duration (stop date 8/2)   > See ID OPAT note dated 7/7 for details of follow up plan  - Appreciate help of Care Coordinators (see note)     #Short gut syndrome  #Malabsorption requiring TPN  Complication of Celestino-en-Y gastric bypass surgery (2003)   -Resumed home Oxycodone & Fentanyl patch for pain control  -Pharm on consult for PPN management while    • Asthma 2/19/2010   • BMI 30.0-30.9,adult    • BMI 31.0-31.9,adult    • Carpal tunnel syndrome 8/6/2016   • COPD, moderate (CMS/HCC)    • Hyperlipidemia 5/3/2011   • Scoliosis        Social History     Socioeconomic History   • Marital status:      Spouse name: Not on file   • Number of children: Not on file   • Years of education: Not on file   • Highest education level: Not on file   Tobacco Use   • Smoking status: Former Smoker   • Smokeless tobacco: Never Used   Substance and Sexual Activity   • Alcohol use: No     Comment: unknown   • Drug use: No   • Sexual activity: Defer        Past Surgical History:   Procedure Laterality Date   • ATHERECTOMY      Cath Atherectomy 1 with stent placement   • ILIAC VEIN ANGIOPLASTY / STENTING      PTA Iliac left       Family History   Problem Relation Age of Onset   • Other Mother         Arteriosclerotic cardiovascular disease, cerebrovascular accident   • Hyperlipidemia Mother    • Hypertension Mother    • Kidney disease Mother    • Other Father         Arteriosclerotic cardiovascular disease         Current Outpatient Medications:   •  albuterol sulfate  (90 Base) MCG/ACT inhaler, Inhale 2 puffs Every 4 (Four) Hours As Needed for Wheezing or Shortness of Air., Disp: 1 inhaler, Rfl: 11  •  amitriptyline (ELAVIL) 50 MG tablet, Take 1 tablet by mouth Every Evening., Disp: 30 tablet, Rfl: 11  •  aspirin 81 MG EC tablet, Take 1 tablet by mouth Daily., Disp: 120 tablet, Rfl: 3  •  fluticasone (FLONASE) 50 MCG/ACT nasal spray, 1 spray into each nostril 2 (Two) Times a Day., Disp: 1 bottle, Rfl: 2  •  gabapentin (NEURONTIN) 100 MG capsule, Take 1 capsule by mouth 2 (Two) Times a Day As Needed (pain). (Patient taking differently: Take 100 mg by mouth 2 (Two) Times a Day As Needed (pain). DR. MATHEWS / DR. ANTUNEZ - PAIN CLINIC), Disp: 60 capsule, Rfl: 2  •  hydrochlorothiazide (MICROZIDE) 12.5 MG capsule, Take 1 capsule by mouth Daily., Disp: 30 capsule, Rfl: 5  •   "inpatient     Chronic/Stable Conditions   #Paroxysmal atrial fibrillation  Patient reports that he has a history of random \"palpitations\" that have been going for several decades.  HR < 100. Last EKG noted NSR  -Resumed home Coreg for rate control   -Resumed home Lovenox for AC     #Essential Hypertension  -Resumed home Coreg     #ADHD  -Resumed home adderall     #Macrocytic anemia  Likely from Vit B12 deficiency 2/2 poor GI absorption  -Receives monthly Cyanocobalamin 1000 mcg IM     #Anxiety disorder  -Resumed home ativan     #Tobacco use  -Started nicotine patches 7mg/24hr       Diet: Regular Diet Adult  parenteral nutrition - Clinimix E  NPO for Medical/Clinical Reasons Except for: Meds    DVT Prophylaxis: Enoxaparin (Lovenox) SQ  Sanchez Catheter: Not present  Lines: None     Cardiac Monitoring: None  Code Status: Full Code      Clinically Significant Risk Factors                         # Overweight: Estimated body mass index is 26.6 kg/m  as calculated from the following:    Height as of this encounter: 1.803 m (5' 11\").    Weight as of this encounter: 86.5 kg (190 lb 11.2 oz).           Disposition Plan      Expected Discharge Date: 07/09/2023      Destination: home with family            Shanae Puente MD  Medicine Service, [facility] TEAM 5  [facility]  Securely message with [employer] (more info)  Text page via [employer] Paging/Directory   See signed in provider for up to date coverage information  ______________________________________________________________________    Interval History   No acute events. Patient visiting with his wife on my assessment today. He has no new concerns, aware of need to be NPO at midnight. Hopeful to get Cm placed and discharge efficiently tomorrow. Looking forward to walking outside today. No new complaints or concerns.    Physical Exam   Vital Signs: Temp: 98.4  F (36.9  C) Temp src: Oral BP: 101/52 Pulse: 80   Resp: 18 SpO2: 95 % O2 " "ipratropium-albuterol (DUO-NEB) 0.5-2.5 mg/3 ml nebulizer, Take 1.5 mL by nebulization Every 6 (Six) Hours As Needed for Wheezing or Shortness of Air., Disp: 360 mL, Rfl: 5  •  meclizine (ANTIVERT) 25 MG tablet, Take 1 tablet by mouth 3 (Three) Times a Day As Needed for dizziness., Disp: 20 tablet, Rfl: 0  •  mometasone-formoterol (DULERA 200) 200-5 MCG/ACT inhaler, Inhale 2 puffs 2 (Two) Times a Day., Disp: 13 g, Rfl: 5  •  ondansetron (ZOFRAN) 4 MG tablet, Take 1 tablet by mouth Every 8 (Eight) Hours As Needed for Nausea or Vomiting., Disp: 20 tablet, Rfl: 5  •  oxyCODONE-acetaminophen (PERCOCET)  MG per tablet, Take 1 tablet by mouth Every 8 (Eight) Hours As Needed for Moderate Pain . (Patient taking differently: Take 1 tablet by mouth Every 8 (Eight) Hours As Needed for Moderate Pain . DR. MATHEWS / DR. ANTUNEZ - PAIN CLINIC), Disp: 90 tablet, Rfl: 0  •  prasugrel (EFFIENT) 10 MG tablet, Take 1 tablet by mouth Daily., Disp: 30 tablet, Rfl: 2  •  propranolol (INDERAL) 20 MG tablet, Take 1 tablet by mouth 2 (Two) Times a Day., Disp: 60 tablet, Rfl: 5  •  raNITIdine (ZANTAC) 150 MG tablet, Take 1 tablet by mouth 2 (Two) Times a Day., Disp: 60 tablet, Rfl: 5  •  simvastatin (ZOCOR) 20 MG tablet, Take 1 tablet by mouth Every Night., Disp: 30 tablet, Rfl: 5  •  spironolactone (ALDACTONE) 50 MG tablet, Take 1 tablet by mouth Daily., Disp: 30 tablet, Rfl: 5  •  tiotropium (SPIRIVA) 18 MCG per inhalation capsule, Place 1 capsule into inhaler and inhale Daily., Disp: 30 capsule, Rfl: 5  •  Omega-3 Fatty Acids (FISH OIL) 1000 MG capsule capsule, Take 1 capsule by mouth 2 (Two) Times a Day With Meals., Disp: 60 each, Rfl: 5    Objective   /60   Pulse 92   Temp 97.6 °F (36.4 °C)   Ht 157.5 cm (62\")   Wt 66.7 kg (147 lb)   SpO2 100%   BMI 26.89 kg/m²     Physical Exam   Constitutional: She is oriented to person, place, and time. She appears well-developed and well-nourished.   HENT:   Head: Normocephalic and " Device: None (Room air)    Weight: 190 lbs 11.17 oz    Constitutional: awake, alert, cooperative, no apparent distress  Respiratory: breathing non-labored on RA  Cardiovascular: RRR  Musculoskeletal: no lower extremity edema present  Neurologic: alert and oriented x3  Neuropsychiatric: calm, normal eye contact, affect normal      Medical Decision Making       **CLEAR ALL SELECTIONS**      Data     I have personally reviewed the following data over the past 24 hrs:    8.5  \   10.6 (L)   / 197     141 106 6.5 /  93   3.7 26 0.71 \        atraumatic.   Eyes: Conjunctivae are normal.   Neck: Normal range of motion. Neck supple.   Pulmonary/Chest: Effort normal.   Musculoskeletal: Normal range of motion.   Neurological: She is alert and oriented to person, place, and time.   Skin: No rash noted.   Psychiatric: She has a normal mood and affect. Her behavior is normal.   Nursing note and vitals reviewed.      Assessment/Plan   Zina was seen today for follow-up.    Diagnoses and all orders for this visit:    Screening mammogram, encounter for  -     Mammo Screening Bilateral With CAD; Future    Simple chronic bronchitis (CMS/HCC)  -     albuterol sulfate  (90 Base) MCG/ACT inhaler; Inhale 2 puffs Every 4 (Four) Hours As Needed for Wheezing or Shortness of Air.    Peripheral vascular disease (CMS/HCC)  -     aspirin 81 MG EC tablet; Take 1 tablet by mouth Daily.  -     prasugrel (EFFIENT) 10 MG tablet; Take 1 tablet by mouth Daily.    BPPV (benign paroxysmal positional vertigo), right  -     meclizine (ANTIVERT) 25 MG tablet; Take 1 tablet by mouth 3 (Three) Times a Day As Needed for dizziness.    Gastroesophageal reflux disease without esophagitis  -     raNITIdine (ZANTAC) 150 MG tablet; Take 1 tablet by mouth 2 (Two) Times a Day.    Essential hypertension  -     spironolactone (ALDACTONE) 50 MG tablet; Take 1 tablet by mouth Daily.  -     CBC & Differential  -     Comprehensive Metabolic Panel  -     Lipid Panel    Other hyperlipidemia  -     CBC & Differential  -     Comprehensive Metabolic Panel  -     Lipid Panel      Discussion Summary:  Patient is a 60 y.o. female presenting for follow up.    1.  Essential hypertension  -Blood pressure has improved since patient has lost weight.  Spironolactone dose was decreased to 50 mg daily instead of twice daily.  2.  GERD  -Stable on Zantac.  Discontinue PPI.    3.  Hyperlipidemia  -Check labs.    4.  COPD  -Stable recently.    5.  Intermittent BPPV  -Stable on current medications.  Continue  meclizine.    Follow up:  No Follow-up on file.

## 2023-07-09 NOTE — PLAN OF CARE
"Goal Outcome Evaluation:       Time: 5364-5380    /52 (BP Location: Right arm)   Pulse 80   Temp 98.4  F (36.9  C) (Oral)   Resp 18   Ht 1.803 m (5' 11\")   Wt 86.5 kg (190 lb 11.2 oz)   SpO2 95%   BMI 26.60 kg/m      Reason for admission: Left subclavian DVT  Activity: UAL  Pain: Generalized, Fentanyl patch in place. PRN oxy given x1  Neuro: A&Ox4  Cardiac: WDL  Respiratory: WDL  GI/: LBM 7/3, voiding spontaneously. Intermittent nausea, PRN zofran given x1  Diet: Regular  Lines: Right PIV-SL  Labs/Imaging: Pt refused AM blood glucose check.     Plan: Cm placement Monday then discharge home.      Continue to monitor and follow POC                  "

## 2023-07-09 NOTE — PLAN OF CARE
Goal Outcome Evaluation:  Doing well. Given Oxycodone for generalized pain with some relief. Afebrile. Continues on IV antibiotics. Plan is for him to get a Cm catheter tomorrow in I.R. and then discharge. Up independently.

## 2023-07-09 NOTE — PLAN OF CARE
1033-0255  VSS on RA. A&Ox4. Reports generalized pain, oxycodone given x2 with partial effectiveness. Zofran given x2. Denies SOB. Continues on IV vanco. Pt refused full Benadryl 50mg pre-med dose and developed hives/rash during infusion. Provider paged, okay to continue infusion. Remaining 25mg Benadryl given. UAL. NPO at midnight for azevedo catheter tomorrow and discharge. Continue w/ POC.

## 2023-07-10 ENCOUNTER — APPOINTMENT (OUTPATIENT)
Dept: INTERVENTIONAL RADIOLOGY/VASCULAR | Facility: CLINIC | Age: 53
DRG: 315 | End: 2023-07-10
Payer: COMMERCIAL

## 2023-07-10 LAB
ALBUMIN SERPL BCG-MCNC: 3.5 G/DL (ref 3.5–5.2)
ALP SERPL-CCNC: 61 U/L (ref 40–129)
ALT SERPL W P-5'-P-CCNC: 11 U/L (ref 0–70)
ANION GAP SERPL CALCULATED.3IONS-SCNC: 8 MMOL/L (ref 7–15)
AST SERPL W P-5'-P-CCNC: 15 U/L (ref 0–45)
BACTERIA BLD CULT: NO GROWTH
BACTERIA BLD CULT: NO GROWTH
BILIRUB SERPL-MCNC: <0.2 MG/DL
BUN SERPL-MCNC: 10.6 MG/DL (ref 6–20)
CALCIUM SERPL-MCNC: 8.6 MG/DL (ref 8.6–10)
CHLORIDE SERPL-SCNC: 106 MMOL/L (ref 98–107)
CK SERPL-CCNC: 54 U/L (ref 39–308)
CREAT SERPL-MCNC: 0.75 MG/DL (ref 0.67–1.17)
DEPRECATED HCO3 PLAS-SCNC: 26 MMOL/L (ref 22–29)
ERYTHROCYTE [DISTWIDTH] IN BLOOD BY AUTOMATED COUNT: 15.5 % (ref 10–15)
GFR SERPL CREATININE-BSD FRML MDRD: >90 ML/MIN/1.73M2
GLUCOSE BLDC GLUCOMTR-MCNC: 110 MG/DL (ref 70–99)
GLUCOSE SERPL-MCNC: 97 MG/DL (ref 70–99)
HCT VFR BLD AUTO: 35.2 % (ref 40–53)
HGB BLD-MCNC: 10.8 G/DL (ref 13.3–17.7)
INR PPP: 1.06 (ref 0.85–1.15)
MAGNESIUM SERPL-MCNC: 2 MG/DL (ref 1.7–2.3)
MCH RBC QN AUTO: 30.3 PG (ref 26.5–33)
MCHC RBC AUTO-ENTMCNC: 30.7 G/DL (ref 31.5–36.5)
MCV RBC AUTO: 99 FL (ref 78–100)
PHOSPHATE SERPL-MCNC: 3.4 MG/DL (ref 2.5–4.5)
PLATELET # BLD AUTO: 159 10E3/UL (ref 150–450)
POTASSIUM SERPL-SCNC: 4.1 MMOL/L (ref 3.4–5.3)
PREALB SERPL IA-MCNC: 11 MG/DL (ref 15–45)
PROT SERPL-MCNC: 6.5 G/DL (ref 6.4–8.3)
RBC # BLD AUTO: 3.56 10E6/UL (ref 4.4–5.9)
SODIUM SERPL-SCNC: 140 MMOL/L (ref 136–145)
TRIGL SERPL-MCNC: 77 MG/DL
WBC # BLD AUTO: 7.3 10E3/UL (ref 4–11)

## 2023-07-10 PROCEDURE — 84100 ASSAY OF PHOSPHORUS: CPT | Performed by: STUDENT IN AN ORGANIZED HEALTH CARE EDUCATION/TRAINING PROGRAM

## 2023-07-10 PROCEDURE — 77001 FLUOROGUIDE FOR VEIN DEVICE: CPT | Mod: 26 | Performed by: RADIOLOGY

## 2023-07-10 PROCEDURE — 272N000192 HC ACCESSORY CR2

## 2023-07-10 PROCEDURE — 272N000570 HC SHEATH CR7

## 2023-07-10 PROCEDURE — 0JH63XZ INSERTION OF TUNNELED VASCULAR ACCESS DEVICE INTO CHEST SUBCUTANEOUS TISSUE AND FASCIA, PERCUTANEOUS APPROACH: ICD-10-PCS | Performed by: RADIOLOGY

## 2023-07-10 PROCEDURE — 83735 ASSAY OF MAGNESIUM: CPT | Performed by: STUDENT IN AN ORGANIZED HEALTH CARE EDUCATION/TRAINING PROGRAM

## 2023-07-10 PROCEDURE — 250N000013 HC RX MED GY IP 250 OP 250 PS 637: Performed by: STUDENT IN AN ORGANIZED HEALTH CARE EDUCATION/TRAINING PROGRAM

## 2023-07-10 PROCEDURE — 36558 INSERT TUNNELED CV CATH: CPT | Performed by: RADIOLOGY

## 2023-07-10 PROCEDURE — 36415 COLL VENOUS BLD VENIPUNCTURE: CPT | Performed by: STUDENT IN AN ORGANIZED HEALTH CARE EDUCATION/TRAINING PROGRAM

## 2023-07-10 PROCEDURE — 99152 MOD SED SAME PHYS/QHP 5/>YRS: CPT

## 2023-07-10 PROCEDURE — 80053 COMPREHEN METABOLIC PANEL: CPT | Performed by: STUDENT IN AN ORGANIZED HEALTH CARE EDUCATION/TRAINING PROGRAM

## 2023-07-10 PROCEDURE — 258N000003 HC RX IP 258 OP 636

## 2023-07-10 PROCEDURE — C1751 CATH, INF, PER/CENT/MIDLINE: HCPCS

## 2023-07-10 PROCEDURE — 250N000011 HC RX IP 250 OP 636: Performed by: RADIOLOGY

## 2023-07-10 PROCEDURE — 250N000011 HC RX IP 250 OP 636

## 2023-07-10 PROCEDURE — 250N000011 HC RX IP 250 OP 636: Mod: JZ

## 2023-07-10 PROCEDURE — 85610 PROTHROMBIN TIME: CPT | Performed by: STUDENT IN AN ORGANIZED HEALTH CARE EDUCATION/TRAINING PROGRAM

## 2023-07-10 PROCEDURE — 76937 US GUIDE VASCULAR ACCESS: CPT | Mod: 26 | Performed by: RADIOLOGY

## 2023-07-10 PROCEDURE — 250N000009 HC RX 250: Performed by: RADIOLOGY

## 2023-07-10 PROCEDURE — 82550 ASSAY OF CK (CPK): CPT

## 2023-07-10 PROCEDURE — 272N000504 HC NEEDLE CR4

## 2023-07-10 PROCEDURE — 120N000002 HC R&B MED SURG/OB UMMC

## 2023-07-10 PROCEDURE — 84478 ASSAY OF TRIGLYCERIDES: CPT | Performed by: STUDENT IN AN ORGANIZED HEALTH CARE EDUCATION/TRAINING PROGRAM

## 2023-07-10 PROCEDURE — 85014 HEMATOCRIT: CPT

## 2023-07-10 PROCEDURE — 99152 MOD SED SAME PHYS/QHP 5/>YRS: CPT | Performed by: RADIOLOGY

## 2023-07-10 PROCEDURE — 36558 INSERT TUNNELED CV CATH: CPT

## 2023-07-10 PROCEDURE — 84134 ASSAY OF PREALBUMIN: CPT | Performed by: STUDENT IN AN ORGANIZED HEALTH CARE EDUCATION/TRAINING PROGRAM

## 2023-07-10 PROCEDURE — 250N000013 HC RX MED GY IP 250 OP 250 PS 637

## 2023-07-10 PROCEDURE — 99232 SBSQ HOSP IP/OBS MODERATE 35: CPT | Mod: GC | Performed by: STUDENT IN AN ORGANIZED HEALTH CARE EDUCATION/TRAINING PROGRAM

## 2023-07-10 PROCEDURE — C1769 GUIDE WIRE: HCPCS

## 2023-07-10 RX ORDER — NALOXONE HYDROCHLORIDE 0.4 MG/ML
0.4 INJECTION, SOLUTION INTRAMUSCULAR; INTRAVENOUS; SUBCUTANEOUS
Status: DISCONTINUED | OUTPATIENT
Start: 2023-07-10 | End: 2023-07-10

## 2023-07-10 RX ORDER — DIPHENHYDRAMINE HCL 12.5MG/5ML
50 LIQUID (ML) ORAL EVERY 12 HOURS
Qty: 118 ML | Refills: 3 | Status: SHIPPED | OUTPATIENT
Start: 2023-07-10 | End: 2023-07-10

## 2023-07-10 RX ORDER — CLINDAMYCIN PHOSPHATE 900 MG/50ML
900 INJECTION, SOLUTION INTRAVENOUS
Status: DISCONTINUED | OUTPATIENT
Start: 2023-07-10 | End: 2023-07-10

## 2023-07-10 RX ORDER — HEPARIN SODIUM,PORCINE 10 UNIT/ML
5-20 VIAL (ML) INTRAVENOUS EVERY 24 HOURS
Status: DISCONTINUED | OUTPATIENT
Start: 2023-07-10 | End: 2023-07-11

## 2023-07-10 RX ORDER — FLUMAZENIL 0.1 MG/ML
0.2 INJECTION, SOLUTION INTRAVENOUS
Status: DISCONTINUED | OUTPATIENT
Start: 2023-07-10 | End: 2023-07-10

## 2023-07-10 RX ORDER — FENTANYL CITRATE 50 UG/ML
25-50 INJECTION, SOLUTION INTRAMUSCULAR; INTRAVENOUS EVERY 5 MIN PRN
Status: DISCONTINUED | OUTPATIENT
Start: 2023-07-10 | End: 2023-07-10

## 2023-07-10 RX ORDER — HEPARIN SODIUM,PORCINE 10 UNIT/ML
5-20 VIAL (ML) INTRAVENOUS
Status: DISCONTINUED | OUTPATIENT
Start: 2023-07-10 | End: 2023-07-11 | Stop reason: HOSPADM

## 2023-07-10 RX ORDER — NALOXONE HYDROCHLORIDE 0.4 MG/ML
0.2 INJECTION, SOLUTION INTRAMUSCULAR; INTRAVENOUS; SUBCUTANEOUS
Status: DISCONTINUED | OUTPATIENT
Start: 2023-07-10 | End: 2023-07-10

## 2023-07-10 RX ORDER — DIPHENHYDRAMINE HCL 12.5MG/5ML
50 LIQUID (ML) ORAL EVERY 12 HOURS
Qty: 118 ML | Refills: 3 | Status: SHIPPED | OUTPATIENT
Start: 2023-07-10 | End: 2023-07-11

## 2023-07-10 RX ORDER — HEPARIN SODIUM,PORCINE 10 UNIT/ML
5 VIAL (ML) INTRAVENOUS
Status: COMPLETED | OUTPATIENT
Start: 2023-07-10 | End: 2023-07-10

## 2023-07-10 RX ADMIN — CARVEDILOL 6.25 MG: 6.25 TABLET, FILM COATED ORAL at 17:00

## 2023-07-10 RX ADMIN — CARVEDILOL 6.25 MG: 6.25 TABLET, FILM COATED ORAL at 08:20

## 2023-07-10 RX ADMIN — DIPHENHYDRAMINE HYDROCHLORIDE 50 MG: 25 SOLUTION ORAL at 20:51

## 2023-07-10 RX ADMIN — OXYCODONE HYDROCHLORIDE 10 MG: 5 SOLUTION ORAL at 02:40

## 2023-07-10 RX ADMIN — SODIUM CHLORIDE, POTASSIUM CHLORIDE, SODIUM LACTATE AND CALCIUM CHLORIDE 1000 ML: 600; 310; 30; 20 INJECTION, SOLUTION INTRAVENOUS at 21:57

## 2023-07-10 RX ADMIN — NYSTATIN: 100000 CREAM TOPICAL at 08:21

## 2023-07-10 RX ADMIN — LORAZEPAM 1 MG: 0.5 TABLET ORAL at 21:09

## 2023-07-10 RX ADMIN — DIPHENHYDRAMINE HYDROCHLORIDE 50 MG: 25 SOLUTION ORAL at 08:20

## 2023-07-10 RX ADMIN — FENTANYL 1 PATCH: 25 PATCH TRANSDERMAL at 08:35

## 2023-07-10 RX ADMIN — MIDAZOLAM 0.5 MG: 1 INJECTION INTRAMUSCULAR; INTRAVENOUS at 10:14

## 2023-07-10 RX ADMIN — OXYCODONE HYDROCHLORIDE 10 MG: 5 SOLUTION ORAL at 11:19

## 2023-07-10 RX ADMIN — OXYCODONE HYDROCHLORIDE 5 MG: 5 SOLUTION ORAL at 16:45

## 2023-07-10 RX ADMIN — LIDOCAINE HYDROCHLORIDE 12 ML: 10 INJECTION, SOLUTION EPIDURAL; INFILTRATION; INTRACAUDAL; PERINEURAL at 10:11

## 2023-07-10 RX ADMIN — Medication 5 ML: at 10:24

## 2023-07-10 RX ADMIN — SUCRALFATE 1 G: 1 SUSPENSION ORAL at 08:20

## 2023-07-10 RX ADMIN — ONDANSETRON 4 MG: 4 TABLET, ORALLY DISINTEGRATING ORAL at 06:38

## 2023-07-10 RX ADMIN — OXYCODONE HYDROCHLORIDE 10 MG: 5 SOLUTION ORAL at 06:35

## 2023-07-10 RX ADMIN — SUCRALFATE 1 G: 1 SUSPENSION ORAL at 12:15

## 2023-07-10 RX ADMIN — FENTANYL CITRATE 50 MCG: 50 INJECTION, SOLUTION INTRAMUSCULAR; INTRAVENOUS at 10:12

## 2023-07-10 RX ADMIN — VANCOMYCIN HYDROCHLORIDE 1500 MG: 10 INJECTION, POWDER, LYOPHILIZED, FOR SOLUTION INTRAVENOUS at 09:12

## 2023-07-10 RX ADMIN — ACETAMINOPHEN 650 MG: 325 SOLUTION ORAL at 16:55

## 2023-07-10 RX ADMIN — NYSTATIN: 100000 CREAM TOPICAL at 22:03

## 2023-07-10 RX ADMIN — FENTANYL CITRATE 50 MCG: 50 INJECTION, SOLUTION INTRAMUSCULAR; INTRAVENOUS at 10:14

## 2023-07-10 RX ADMIN — OXYCODONE HYDROCHLORIDE 10 MG: 5 SOLUTION ORAL at 21:57

## 2023-07-10 RX ADMIN — MIDAZOLAM 1.5 MG: 1 INJECTION INTRAMUSCULAR; INTRAVENOUS at 10:11

## 2023-07-10 RX ADMIN — SUCRALFATE 1 G: 1 SUSPENSION ORAL at 21:57

## 2023-07-10 RX ADMIN — ENOXAPARIN SODIUM 80 MG: 80 INJECTION SUBCUTANEOUS at 20:51

## 2023-07-10 RX ADMIN — ENOXAPARIN SODIUM 80 MG: 80 INJECTION SUBCUTANEOUS at 08:20

## 2023-07-10 RX ADMIN — SUCRALFATE 1 G: 1 SUSPENSION ORAL at 16:55

## 2023-07-10 RX ADMIN — VANCOMYCIN HYDROCHLORIDE 1500 MG: 10 INJECTION, POWDER, LYOPHILIZED, FOR SOLUTION INTRAVENOUS at 21:57

## 2023-07-10 RX ADMIN — DEXTROAMPHETAMINE SACCHARATE, AMPHETAMINE ASPARTATE, DEXTROAMPHETAMINE SULFATE, AND AMPHETAMINE SULFATE 20 MG: 2.5; 2.5; 2.5; 2.5 TABLET ORAL at 08:29

## 2023-07-10 ASSESSMENT — ACTIVITIES OF DAILY LIVING (ADL)
ADLS_ACUITY_SCORE: 26

## 2023-07-10 NOTE — PRE-PROCEDURE
GENERAL PRE-PROCEDURE:   Procedure:  Tunneled central venous catheter placement  Date/Time:  7/10/2023 9:49 AM    Verbal consent obtained?: Yes    Written consent obtained?: Yes    Risks and benefits: Risks, benefits and alternatives were discussed    Consent given by:  Patient  Patient states understanding of procedure being performed: Yes    Patient's understanding of procedure matches consent: Yes    Procedure consent matches procedure scheduled: Yes    Appropriately NPO:  Yes  Mallampati  :  Grade 2- soft palate, base of uvula, tonsillar pillars, and portion of posterior pharyngeal wall visible  Lungs:  Lungs clear with good breath sounds bilaterally  Heart:  Normal heart sounds and rate  History & Physical reviewed:  History and physical reviewed and no updates needed  Statement of review:  I have reviewed the lab findings, diagnostic data, medications, and the plan for sedation

## 2023-07-10 NOTE — PROGRESS NOTES
Care Management Follow Up    Length of Stay (days): 6    Expected Discharge Date: 07/10/2023     Concerns to be Addressed: discharge planning     Patient plan of care discussed at interdisciplinary rounds: Yes    Anticipated Discharge Disposition: Home Care, Home Infusion     Anticipated Discharge Services: None  Anticipated Discharge DME: Shower Chair    Patient/family educated on Medicare website which has current facility and service quality ratings: no  Education Provided on the Discharge Plan: Yes  Patient/Family in Agreement with the Plan: yes    Referrals Placed by CM/SW: External Care Coordination      Additional Information:  RNCC spoke with patient and provider and [facility] to figure out best plan for safe discharge. 5/6 home health agencies will not take patient back due to non-compliance. Patient is agreeable to going to infusion center for labs and dressing changes and line care.     [facility] aware and discharge orders being worked on so that patient can discharge today with medication made in time to be delivered to his home.     MARTA Garrison, BSN., RN  Nurse Care Coordinator  Pager: [phone] - RNCC [facility]  Social Work and Care Management Department   [address] [place] [id number]  [email or web address]  Employed by [facility]     SEARCHABLE in [employer] - search CARE COORDINATOR     [place] [facility] [place] ([phone]) Saturday & Sunday; ([phone]) [facility] Recognized Holidays    Units: 7A, 7B, 7C, 7D & 5C    Pager: [phone]

## 2023-07-10 NOTE — PLAN OF CARE
Goal Outcome Evaluation:  0700-1930  Parker was supposed to discharge today, but did not have a Home Care agency that would take him. Later in the day a Home Care agency was found that will take him so the plan is for him to discharge tomorrow. He declined to start TPN tonight so he will get a liter of Lactated Ringers. Afebrile. Given Oxycodone for generalized pain with some relief. Up independently.

## 2023-07-10 NOTE — IR NOTE
Patient Name: Parker Acevedo  Medical Record Number: [id number]  Today's Date: 7/10/2023    Procedure: Tunneled Central Venous Catheter Placement  Proceduralist: Dr. Henry  Pathology present: N/A    Procedure Start: 1008  Procedure end: 1029  Sedation medications administered: 2 mg versed & 100 mcg fentanyl     Report given to: Cecilia MOYER RN  : N/A    Other Notes: Pt arrived to IR room 5 from 7D. Consent reviewed. Pt denies any questions or concerns regarding procedure. Pt positioned supine and monitored per protocol. Pt tolerated procedure without any noted complications. Pt transferred back to 7D.

## 2023-07-10 NOTE — PROCEDURES
[facility]    Procedure: IR Procedure Note    Date/Time: 7/10/2023 10:31 AM    Performed by: John Henry MD  Authorized by: John Henry MD      UNIVERSAL PROTOCOL   Site Marked: NA  Prior Images Obtained and Reviewed:  Yes  Required items: Required blood products, implants, devices and special equipment available    Patient identity confirmed:  Verbally with patient, arm band, provided demographic data and hospital-assigned identification number  Patient was reevaluated immediately before administering moderate or deep sedation or anesthesia  Confirmation Checklist:  Patient's identity using two indicators, relevant allergies, procedure was appropriate and matched the consent or emergent situation and correct equipment/implants were available  Time out: Immediately prior to the procedure a time out was called    Universal Protocol: the Joint Commission Universal Protocol was followed    Preparation: Patient was prepped and draped in usual sterile fashion       ANESTHESIA    Anesthesia: Local infiltration  Local Anesthetic:  Lidocaine 1% without epinephrine  Anesthetic Total (mL):  15      SEDATION  Patient Sedated: Yes    Sedation Type:  Moderate (conscious) sedation  Sedation:  Fentanyl and midazolam  Vital signs: Vital signs monitored during sedation    Fluoroscopy Time: 1 minute(s)  See dictated procedure note for full details.  Findings: 6 [place]ian 26 cm double lumen BD PowerLine placed via left internal jugular vein. Tip in the atriocaval junction. Heparin locked and ready for use.    Specimens: none    Complications: None    Condition: Stable      PROCEDURE    Patient Tolerance:  Patient tolerated the procedure well with no immediate complications  Length of time physician/provider present for 1:1 monitoring during sedation: 20

## 2023-07-10 NOTE — PLAN OF CARE
"/75 (BP Location: Right arm)   Pulse 73   Temp 98.6  F (37  C) (Oral)   Resp 18   Ht 1.803 m (5' 11\")   Wt 86.6 kg (191 lb)   SpO2 100%   BMI 26.64 kg/m      Pt a&ox4. Rated generalized pain 7/10 and prn oxy given and effective. VSS on RA. Lungs clear with no SOB/chest pain on exertion. Bowel sounds present x4 and patient doesn't remember LBM. Voiding independently to the bathroom. NPO for azevedo placement today. Up independently to downstairs and back. Plan may discharge today.     "

## 2023-07-10 NOTE — PROGRESS NOTES
[facility]    Medicine Progress Note - Medicine Service, LINA TEAM 5    Date of Admission:  7/4/2023    Assessment & Plan   Parker Acevedo is a 52 year old male admitted on 7/4/2023 for left subclavian DVT, possible concurrent cellulitis, and a recent untreated MRSE bacteremia - all most likely caused by his exisitng PICC line . He has a history of catheter-associated infections and venous occlusions.     Updates 7/9:  - Cm placed  - Continue vancomycin  - Pt unable to discharge until after signing contract with home infusion center. See RNCC note for further details.  - Pt will be adjusting to daptomycin in the AM instead of discharging on vanc   > BL CK ordered.     Of Note: See 7/6 progress note for pt experience and room search.      #Possible LUE nonpurulent cellulitis  #Septic Thrombus Left Subclavian Vein, Likely  #Recent MRSE bacteremia  #Hx of Cm catheter-associated polymicrobial bacteremia (06/2023)  #Hx of fungemia  Blood cx's from [place] grew methicillin-resistant Staph Epidermidis. Believe these infections were also provoked by his existing PICC line and poor line care. Blood cultures negative within this recent 48hr period. Pt unable to have PICC placed d/t right and left subclavian DVT's. Will need radiology to place line. This is pt barrier to discharge. Care Coordinator helping with securing home vancomycin and home cares.   -PTA Lovenox 80 mg SQ BID  -Patient not a good candidate for Eliquis due to his poor GI absorption since getting gastric bypass (2003)  - ID recs appreciated   > RONEL and TTE negative   > Continue daptomycin for 4wk duration (stop date 8/2)   > See ID OPAT note dated 7/7 for details of follow up plan  - Appreciate help of Care Coordinators (see note)     #Short gut syndrome  #Malabsorption requiring TPN  Complication of Celestino-en-Y gastric bypass surgery (2003)   -Resumed home Oxycodone & Fentanyl patch for pain  "control  -Pharm on consult for PPN management while inpatient     Chronic/Stable Conditions   #Paroxysmal atrial fibrillation  Patient reports that he has a history of random \"palpitations\" that have been going for several decades.  HR < 100. Last EKG noted NSR  -Resumed home Coreg for rate control   -Resumed home Lovenox for AC     #Essential Hypertension  -Resumed home Coreg     #ADHD  -Resumed home adderall     #Macrocytic anemia  Likely from Vit B12 deficiency 2/2 poor GI absorption  -Receives monthly Cyanocobalamin 1000 mcg IM     #Anxiety disorder  -Resumed home ativan     #Tobacco use  -Started nicotine patches 7mg/24hr       Diet: Regular Diet Adult  Diet    DVT Prophylaxis: Enoxaparin (Lovenox) SQ  Sanchez Catheter: Not present  Lines: PRESENT             Cardiac Monitoring: None  Code Status: Full Code      Clinically Significant Risk Factors                         # Overweight: Estimated body mass index is 26.23 kg/m  as calculated from the following:    Height as of this encounter: 1.803 m (5' 11\").    Weight as of this encounter: 85.3 kg (188 lb 1.6 oz).           Disposition Plan      Expected Discharge Date: 07/10/2023,  6:00 PM    Destination: home with family  Discharge Comments: After Cm placement and PLC          Rush Monteiro MD  Medicine Service, [facility] TEAM   [facility]  Securely message with [employer] (more info)  Text page via Luc[employer] (MemberRx) Paging/Directory   See signed in provider for up to date coverage information  ______________________________________________________________________    Interval History   No acute events. Patient patiently awaiting discharge.     Physical Exam   Vital Signs: Temp: 99.3  F (37.4  C) Temp src: Oral BP: 127/85 Pulse: 80   Resp: 16 SpO2: 97 % O2 Device: None (Room air)    Weight: 188 lbs 1.6 oz    Constitutional: awake, alert, cooperative, no apparent distress  Respiratory: breathing non-labored on " RA  Cardiovascular: RRR  Musculoskeletal: no lower extremity edema present  Neurologic: alert and oriented x3  Neuropsychiatric: calm, normal eye contact, affect normal      Medical Decision Making       **CLEAR ALL SELECTIONS**      Data     I have personally reviewed the following data over the past 24 hrs:    7.3  \   10.8 (L)   / 159     140 106 10.6 /  97   4.1 26 0.75 \       ALT: 11 AST: 15 AP: 61 TBILI: <0.2   ALB: 3.5 TOT PROTEIN: 6.5 LIPASE: N/A       INR:  1.06 PTT:  N/A   D-dimer:  N/A Fibrinogen:  N/A

## 2023-07-10 NOTE — PHARMACY
[facility]  Parenteral ANtibiotic Review at Departure from Acute Care Collaborative Note     Patient: Parker Acevedo  MRN: [id number]  Allergies: Bactrim [sulfamethoxazole-trimethoprim], Penicillins, Ertapenem, Doxycycline, and Vancomycin    Current Location: [facility] 7D  OPAT to be provided by: [facility]       Line Type: Other (Cm)    Diagnosis/Indications: MRSE CLABSI with catheter-associated thrombophlebitis  Organism(s): MRSE  MRDO? Yes - other  Pending Cultures/Microbiological Tests: yes 7/5/23-7/7/23 blood cultures no growth to date    Inpatient ID involved in developing OPAT plan: Yes - discharge OPAT plan has changes from when ID provider, Dr. Shon Alanis, last evaluated OPAT plan on 7/7/2023, changes are reflected below (changed from IV vancomycin to daptomycin)    Outpatient ID Follow-up: ID OPAT Clinic Referral Placed ([facility] Ph: [phone] and Fax: [phone]) - appointment scheduled with Dr. Horne on 8/9/2023 @ 10:00 AM.  Designated Provider: Dr. Alanis and Lakesha Mcgregor NP    Antimicrobial Regimen / Route Anticipated  Duration Start Date Stop /  Reassess Date   Daptomycin 500 mg (~6 mg/kg) ActBW) every 24 hours 4 weeks 7/5/23 8/2/23     Laboratory Tests and Monitoring Frequency: CBC with Diff, BMP, CRP, CK Once Weekly    Imaging/Miscellaneous Monitoring: None needed    ID Pharmacist Interventions: Medication Change [facility] expressed some concerns regarding outpatient nursing visits. In attempt to stick with once weekly outpatient nursing visits, plan to transition to daptomycin 500 mg every 24 hours. Discussed with Dr. Alanis, who was agreeable to this change. Patient will need to get baseline CK and weekly checks thereafter while on daptomycin. In addition, will give first daptomycin dose prior to discharge. Followed up with primary team to update them with changes in the OPAT plan.                          Written  by:  Eduardo Ansari, PharmD, MPH  PGY2 ID Pharmacy Resident  Pager: [phone]    Posted by:  Wai TurnerD, BCIDP  Pager: [phone]

## 2023-07-10 NOTE — PROGRESS NOTES
"    Interventional Radiology  [facility] PROGRESS Note  07/10/23   7:50 AM    Please see consult note from Dr. Odalys Hankins from 7/8/23.    Recommendations/Plan:    -Patient is on IR schedule today for placement of a LEFT 6Fr double-lumen TCVC for TPN and Vancomycin.   -Labs WNL for procedure.  -Orders entered for procedure as well as pre-op IV antibiotics. Pt has been NPO since midnight.  -Consent will be done prior to procedure.     Please contact the IR charge RN at [phone] for estimated time of procedure.     Case and imaging discussed with IR attending, Dr. Henry.    Vitals:   /75 (BP Location: Right arm)   Pulse 73   Temp 98.6  F (37  C) (Oral)   Resp 18   Ht 1.803 m (5' 11\")   Wt 86.6 kg (191 lb)   SpO2 100%   BMI 26.64 kg/m      Pertinent Labs:   Lab Results   Component Value Date    WBC 7.3 07/10/2023    WBC 8.5 07/09/2023    WBC 11.6 (H) 07/08/2023    WBC 6.9 06/29/2021    WBC 8.1 06/14/2021    WBC 7.5 06/09/2021     Lab Results   Component Value Date    HGB 10.8 07/10/2023    HGB 10.6 07/09/2023    HGB 9.9 07/08/2023    HGB 12.1 06/29/2021    HGB 12.0 06/14/2021    HGB 13.3 06/09/2021     Lab Results   Component Value Date     07/10/2023     07/09/2023     07/08/2023     06/29/2021     06/14/2021     06/09/2021     Lab Results   Component Value Date    INR 1.06 07/10/2023    INR 1.07 05/07/2021    PTT 28 01/21/2023    PTT 26 07/22/2019     Lab Results   Component Value Date    POTASSIUM 3.7 07/09/2023    POTASSIUM 3.8 12/13/2022    POTASSIUM 3.5 06/29/2021        COVID-19 Antibody Results, Testing for Immunity         No data to display            COVID-19 PCR Results        7/12/2021    10:23 1/8/2022    15:34 2/24/2022    01:49 4/9/2022    10:58 5/7/2022    14:52   COVID-19 PCR Results   SARS CoV2 PCR Negative  Negative  Negative  Negative  Negative            7/7/2022    13:19 7/16/2022    22:19 8/9/2022    18:06   COVID-19 PCR " Results   SARS CoV2 PCR Negative  Negative  Negative        Juana Burgos PA-C  Interventional Radiology  Pager: [phone]     no

## 2023-07-10 NOTE — DISCHARGE SUMMARY
Care Management Discharge Note    Discharge Date: 07/10/2023       Discharge Disposition: Home Care, Home Infusion    Discharge Services: None    Discharge DME: Shower Chair    Discharge Transportation: family or friend will provide    Private pay costs discussed: Not applicable    Does the patient's insurance plan have a 3 day qualifying hospital stay waiver?  No    Patient/family educated on Medicare website which has current facility and service quality ratings: no    Education Provided on the Discharge Plan: Yes  Persons Notified of Discharge Plans: Yes  Patient/Family in Agreement with the Plan: yes    Handoff Referral Completed: Yes    Additional Information:  RNCC spoke with patient regarding issues with home health nursing. Patient states has not been happy with home health nurse from [facility] and would like another nurse if [facility] takes patient back. RNCC called [facility] to see if taking patient back. [facility] states they are unable to care for patient with home IV Abx, especially since the recommendations are for TCU.     RNCC called [facility] to relay this information. [facility] will assist in finding skilled nursing. Home orders are placed with infusion medication and TPN in orders.     Cm was placed for patient earlier today. Patient and spouse are in room, ready to leave. Awaiting finalization on home health.     RNCC/SW will continue to follow for discharge planning and care management needs.     MARTA Garrison, BSN., RN  Nurse Care Coordinator  Pager: [phone] - RNCC [facility]  Social Work and Care Management Department   [address] [place] [phone]  [email or web address]  Employed by [facility]     SEARCHABLE in [employer] - search CARE COORDINATOR     [place] (0800-1630) Saturday & Sunday; (0800-1630)  Recognized Holidays     Units: 7A, 7B, 7C, 7D & 5C    Pager: [phone]

## 2023-07-10 NOTE — PROVIDER NOTIFICATION
"Messaged Dr. Garrison at 2226 via [employer]    \"Pt refused to take full 50mg Benadryl before Vanco today, gave 25mg. Has hives and is itchy now. Giving the other 25 now. Ok to keep vanco going? About 30 mins left.\"    -Okay to continue Vanco, will consult with day team  "

## 2023-07-10 NOTE — DISCHARGE INSTRUCTIONS
Weekly labs required: CBC with diff, BMP, CRP and Vancomycin level. Dr. Alanis will follow labs at discharge until ID follow up. Fax labs to ID clinic.   [facility] Information:  Phone: [phone]  Fax: [phone] (Attention Carlos Sorensen)

## 2023-07-11 VITALS
RESPIRATION RATE: 20 BRPM | OXYGEN SATURATION: 100 % | DIASTOLIC BLOOD PRESSURE: 82 MMHG | TEMPERATURE: 98.2 F | SYSTOLIC BLOOD PRESSURE: 119 MMHG | HEART RATE: 59 BPM | WEIGHT: 191.4 LBS | BODY MASS INDEX: 26.8 KG/M2 | HEIGHT: 71 IN

## 2023-07-11 LAB
BACTERIA BLD CULT: NO GROWTH
ERYTHROCYTE [DISTWIDTH] IN BLOOD BY AUTOMATED COUNT: 15.4 % (ref 10–15)
HCT VFR BLD AUTO: 32.8 % (ref 40–53)
HGB BLD-MCNC: 10 G/DL (ref 13.3–17.7)
MCH RBC QN AUTO: 30 PG (ref 26.5–33)
MCHC RBC AUTO-ENTMCNC: 30.5 G/DL (ref 31.5–36.5)
MCV RBC AUTO: 99 FL (ref 78–100)
PLATELET # BLD AUTO: 217 10E3/UL (ref 150–450)
RBC # BLD AUTO: 3.33 10E6/UL (ref 4.4–5.9)
WBC # BLD AUTO: 6.7 10E3/UL (ref 4–11)

## 2023-07-11 PROCEDURE — 250N000011 HC RX IP 250 OP 636: Mod: JZ

## 2023-07-11 PROCEDURE — 250N000011 HC RX IP 250 OP 636: Performed by: RADIOLOGY

## 2023-07-11 PROCEDURE — 250N000013 HC RX MED GY IP 250 OP 250 PS 637: Performed by: STUDENT IN AN ORGANIZED HEALTH CARE EDUCATION/TRAINING PROGRAM

## 2023-07-11 PROCEDURE — 250N000013 HC RX MED GY IP 250 OP 250 PS 637

## 2023-07-11 PROCEDURE — 258N000003 HC RX IP 258 OP 636

## 2023-07-11 PROCEDURE — 85027 COMPLETE CBC AUTOMATED: CPT

## 2023-07-11 PROCEDURE — 250N000011 HC RX IP 250 OP 636: Mod: JZ | Performed by: STUDENT IN AN ORGANIZED HEALTH CARE EDUCATION/TRAINING PROGRAM

## 2023-07-11 RX ADMIN — ACETAMINOPHEN 650 MG: 325 SOLUTION ORAL at 03:38

## 2023-07-11 RX ADMIN — SODIUM CHLORIDE, PRESERVATIVE FREE 10 ML: 5 INJECTION INTRAVENOUS at 01:00

## 2023-07-11 RX ADMIN — ACETAMINOPHEN 650 MG: 325 SOLUTION ORAL at 10:17

## 2023-07-11 RX ADMIN — DEXTROAMPHETAMINE SACCHARATE, AMPHETAMINE ASPARTATE, DEXTROAMPHETAMINE SULFATE, AND AMPHETAMINE SULFATE 20 MG: 2.5; 2.5; 2.5; 2.5 TABLET ORAL at 08:25

## 2023-07-11 RX ADMIN — NYSTATIN: 100000 CREAM TOPICAL at 08:45

## 2023-07-11 RX ADMIN — SUCRALFATE 1 G: 1 SUSPENSION ORAL at 12:58

## 2023-07-11 RX ADMIN — Medication 5 ML: at 03:36

## 2023-07-11 RX ADMIN — ONDANSETRON 4 MG: 4 TABLET, ORALLY DISINTEGRATING ORAL at 03:36

## 2023-07-11 RX ADMIN — ONDANSETRON 4 MG: 4 TABLET, ORALLY DISINTEGRATING ORAL at 10:17

## 2023-07-11 RX ADMIN — SODIUM CHLORIDE, PRESERVATIVE FREE 5 ML: 5 INJECTION INTRAVENOUS at 10:53

## 2023-07-11 RX ADMIN — DAPTOMYCIN 500 MG: 500 INJECTION, POWDER, LYOPHILIZED, FOR SOLUTION INTRAVENOUS at 09:47

## 2023-07-11 RX ADMIN — SUCRALFATE 1 G: 1 SUSPENSION ORAL at 08:25

## 2023-07-11 RX ADMIN — ENOXAPARIN SODIUM 80 MG: 80 INJECTION SUBCUTANEOUS at 08:25

## 2023-07-11 RX ADMIN — OXYCODONE HYDROCHLORIDE 10 MG: 5 SOLUTION ORAL at 03:36

## 2023-07-11 RX ADMIN — CARVEDILOL 6.25 MG: 6.25 TABLET, FILM COATED ORAL at 08:25

## 2023-07-11 RX ADMIN — OXYCODONE HYDROCHLORIDE 10 MG: 5 SOLUTION ORAL at 10:18

## 2023-07-11 ASSESSMENT — ACTIVITIES OF DAILY LIVING (ADL)
ADLS_ACUITY_SCORE: 26

## 2023-07-11 NOTE — PROGRESS NOTES
Reviewed discharge orders and medications with Parker and he verbalized understanding. He has a follow up clinic appointment and phone numbers to call with questions.

## 2023-07-11 NOTE — DISCHARGE SUMMARY
Care Management Discharge Note    Discharge Date: 07/11/2023       Discharge Disposition: Home Care, Home Infusion    Discharge Services: None    Discharge DME: Shower Chair    Discharge Transportation: family or friend will provide    Private pay costs discussed: Not applicable    Does the patient's insurance plan have a 3 day qualifying hospital stay waiver?  No    Patient/family educated on Medicare website which has current facility and service quality ratings: no    Education Provided on the Discharge Plan: Yes  Persons Notified of Discharge Plans: Yes  Patient/Family in Agreement with the Plan: yes    Handoff Referral Completed: Yes    Additional Information:  RNCC spoke with [facility] regarding patient's discharge and they were able to come onto the unit with [facility] for patient to sign a safety contract for home health and [facility] stating that he will be cooperative and follow through with plan of care or he will no longer receive services. Patient agreed to sign form.     Patient will have services for IV Abx and TPN from [facility]. Patient will have home health/skilled nursing from [facility]. Patient will need weekly labs. Antibiotics changed today to Dapto, [facility] aware of this change.     RNCC/SW will continue to follow for discharge planning and care management needs.     MARTA Garrison, ERIN., RN  Nurse Care Coordinator  Pager: [phone] - RNCC [facility]  Social Work and Care Management Department   [address] [place] [id number]  [email or web address]  Employed by [facility]     SEARCHABLE in [facility] - search CARE COORDINATOR     [place] (7260-0104) Saturday & Sunday; (2150-4643) [facility] Holidays       Units: 7A, 7B, 7C, 7D & 5C    Pager: [phone]

## 2023-07-11 NOTE — PROGRESS NOTES
Patient has been educated on potential risks of choosing to leave the unit and that the responsibility for patient well-being will belong to the patient. Pt has been informed that admission to hospital is due to need for medical treatment. Education given to the patient on some of the potential risks included but are not limited to:      - lack of access to nursing intervention      - possible missed appointments with MD, therapies, tests      - possible missed medications, antibiotics, management of IV's    Patient Response: Acceptance and understanding    Patient notified staff prior to leaving unit: Yes  Coban wrap placed over IV prior to pt leaving unit: No, has CVC, confirmed caps in place

## 2023-07-11 NOTE — PLAN OF CARE
Goal Outcome Evaluation:  5042-4464: Pt A&Ox4 with VSS on RA and behavior appropriate to situation. No complaints of SOB. Pain continued generalized throughout body and HA, given PRN oxy x2 and PRN tylenol x1 with some relief. Nausea controlled with PRN zofran. New CVC CDI with some blood noted around insertion site. HL in between uses. Continues on BID vanco. 1L LR bolus given instead of TPN this shift. Spot check BG at 2100 of 110. At end of shift, Parker mentioned he had not peed yet, encouraged to try pt stated he does not feel like he needs to and often goes this long sometimes without peeing, continue to monitor. No BMP ordered with AM labs. Continuing to go outside, educated on safety risks. Up independently. No BM. Poor oral intake. Sleeping intermittently throughout night. Able to make needs known. Continue with POC, possible discharge today pending home care referral.

## 2023-07-11 NOTE — PROGRESS NOTES
Home Infusion  Plan for patient to discharge today on IV cycled TPN and Daptomycin q24hrs.  Parker has been receiving IV therapy through \[facility]\"" for approximately 10years.  Patient has had many clabsi infections and line replacements over the years.  Recent concerns noted by home care nursing regarding patient not returning calls, refusing home RN visits, and not adhering to care plan.  Previous home care agency [facility] not willing to take patient back on service.  \[facility]\"" contacted the other home care agencies that service patient's area and all but 1 have had Parker on service in the past and decline to provide home care for him again due to compliance issues.  [facility] willing to accept patient on service with care agreement in place.   Met with Parker, spouse Rose, and [facility] nurse Anderson at bedside to review home infusion and home care services and discharge plans.  Confirmed discharge address, phone, and emergency contact information.  Informed patient and spouse that expectation will be for Parker to comply with weekly RN visits for central line dressing changes, lab draws, and assessment.  Patient reports he will sometimes refuse RN visit if he is not feeling well.  Explained to patient that he should accept RN visit especially when he is not feeling well to help identify health concerns promptly and communicate to his provider.  Stressed to patient importance of communication with \[facility]\"" and home care agency.  Informed patient he will need to answer calls from \[facility]\"" and home care agency, and return messages when we do not reach him on the phone.  Spouse Rose states if we are unable to reach Parker, staff should also call her to assist with communication and planning.  Informed patient and spouse that only trained nurses should be performing central line dressing changes in the home.  Spouse should not be changing dressing herself.  Instructed them to call home care agency to request RN visit for dressing change if  needed outside of weekly visit.      Informed patient and spouse that for [facility] to continue safely providing home infusion therapies, home care nursing must be involved.  Patient and spouse in agreement.  Care agreement signed.  Questions answered.  [facility] will plan to deliver medications and supplies to patient's home by end of day today.  [facility] will contact patient to schedule initial RN visit this week.  Patient is ready for discharge from [facility] perspective.          Eladio Garay RN [facility] Nurse Liaison   [email or web address]  Cell: [phone] M-F  [facility] Main: [phone]

## 2023-07-11 NOTE — DISCHARGE SUMMARY
"[facility]  Discharge Summary - Medicine & Pediatrics       Date of Admission:  7/4/2023  Date of Discharge:  7/11/2023  1:02 PM  Discharging Provider: Rush Chairez  Discharge Service: Medicine ServiceLINA TEAM 5    Discharge Diagnoses   MRSE Bacteremia   LUE Nonpurulent Cellulitis  Septic Thrombus of Left Subclavian Vein    Clinically Significant Risk Factors     # Overweight: Estimated body mass index is 26.69 kg/m  as calculated from the following:    Height as of this encounter: 1.803 m (5' 11\").    Weight as of this encounter: 86.8 kg (191 lb 6.4 oz).       Follow-ups Needed After Discharge   Follow-up Appointments     Follow Up ([facility])      Follow up with primary care provider, Chuy Isaac, within 7 days to   evaluate medication change and for hospital follow- up.  The following   labs/tests are recommended: CBC with diff, CRP, BMP and CK (weekly while   on dapto). Please reorder for ID appt as well if more than a week out from   PCP appt.    Follow up with Infectious Disease, within 7days  for hospital follow- up.   The following labs/tests are recommended: CBC with diff, BMP, CRP, and CK   (weekly).    Appointments on [place] and/or [facility] (with [facility] or [facility]   provider or service). Call [phone] if you haven't heard regarding   these appointments within 7 days of discharge.            Unresulted Labs Ordered in the Past 30 Days of this Admission     Date and Time Order Name Status Description    7/7/2023 12:02 AM Blood Culture Hand, Right Preliminary       These results will be followed up by PCP    Discharge Disposition   Discharged to home  Condition at discharge: Stable    Hospital Course   Parker Acevedo is a 52 year old male admitted on 7/4/2023 for left subclavian DVT, concurrent cellulitis, and a recent MRSE bacteremia - all most likely caused by his exisitng PICC line currently showing improvement of cellulitis and " "bacteremia on vancomycin regimen being discharged on Daptomycin.       Updates:     Of Note: See 7/6 progress note for pt experience and room search.      #Possible LUE nonpurulent cellulitis  #Septic Thrombus Left Subclavian Vein, Likely  #Recent MRSE bacteremia  #Hx of Cm catheter-associated polymicrobial bacteremia (06/2023)  #Hx of fungemia  Blood cx's from [place] grew methicillin-resistant Staph Epidermidis. Believe these infections were also provoked by his existing PICC line and poor line care. Blood cultures negative after 48hrs. Pt unable to have PICC placed d/t right and left subclavian DVT's. Needed radiology to place line. Care Coordinator aided with securing home daptomycin and home cares with difficulties with placement commented in RNCC note. Pt was followed by ID and course to be completed on 8/2 with weekly labs including CK.    -PTA Lovenox 80 mg SQ BID  -Patient not a good candidate for Eliquis due to his poor GI absorption since getting gastric bypass (2003)  - ID recs appreciated              > RONEL and TTE negative              > Continue daptomycin for 4wk duration (stop date 8/2) with weekly CBC with diff, BMP, CRP, and CK              > See ID OPAT note dated 7/10 for details of follow up plan   > ID appt 8/9  - Appreciate help of Care Coordinators (see note)        #Short gut syndrome  #Malabsorption requiring TPN  Complication of Celestino-en-Y gastric bypass surgery (2003)   -Resumed home Oxycodone & Fentanyl patch for pain control  -Discharged on home TPN     Chronic/Stable Conditions   #Paroxysmal atrial fibrillation  Patient reports that he has a history of random \"palpitations\" that have been going for several decades.  HR < 100. Last EKG noted NSR  -home Coreg for rate control   -home Lovenox for AC     #Essential Hypertension  -Resumed home Coreg     #ADHD  -Resumed home adderall     #Macrocytic anemia  Likely from Vit B12 deficiency 2/2 poor GI absorption  -Receives monthly " Cyanocobalamin 1000 mcg IM     #Anxiety disorder  -Resumed home ativan     #Tobacco use      Consultations This Hospital Stay   PHARMACY TO DOSE VANCO  NUTRITION SERVICES ADULT IP CONSULT  PHARMACY TO DOSE VANCO  NUTRITION SERVICES ADULT IP CONSULT  PHARMACY/NUTRITION TO START AND MANAGE TPN  INFECTIOUS DISEASE GENERAL ADULT IP CONSULT  PHARMACY IP CONSULT  NURSING TO CONSULT FOR VASCULAR ACCESS CARE IP CONSULT  NURSING TO CONSULT FOR VASCULAR ACCESS CARE IP CONSULT  NURSING TO CONSULT FOR VASCULAR ACCESS CARE IP CONSULT  ADDICTION SERVICE ADULT IP CONSULT FOR [place]  CARE MANAGEMENT / SOCIAL WORK IP CONSULT  VASCULAR ACCESS FOR PICC PLACEMENT ADULT IP CONSULT  INTERVENTIONAL RADIOLOGY ADULT/PEDS IP CONSULT  NURSING TO CONSULT FOR VASCULAR ACCESS CARE IP CONSULT  OPAT PHARMACY IP CONSULT    Code Status   Full Code       The patient was discussed with Dr. Shay Monteiro MD  [facility] [address] [address]  Phone: [phone]  ______________________________________________________________________    Physical Exam   Vital Signs: Temp: 98.2  F (36.8  C) Temp src: Oral BP: 119/82 Pulse: 59   Resp: 20 SpO2: 100 % O2 Device: None (Room air)    Weight: 191 lbs 6.4 oz  Constitutional: awake, alert, cooperative, no apparent distress  Respiratory: breathing non-labored on RA  Cardiovascular: RRR  Musculoskeletal: no lower extremity edema present, right arm edema and erythema resolved  Neurologic: alert and oriented x3  Neuropsychiatric: calm, normal eye contact, affect normal      Primary Care Physician   Chuy Isaac    Discharge Orders      OPAT enrollment and ID Clinic Referral      Home Infusion Referral      Home Infusion Referral      Primary Care - Care Coordination Referral      Home Care Referral      Reason for your hospital stay    MRSE Bacteremia     Follow Up ([facility])    Follow up with primary care provider, Chuy Isaac,  within 7 days to evaluate medication change and for hospital follow- up.  The following labs/tests are recommended: CBC with diff, CRP, BMP and CK (weekly while on dapto). Please reorder for ID appt as well if more than a week out from PCP appt.    Follow up with Infectious Disease, within 7days  for hospital follow- up. The following labs/tests are recommended: CBC with diff, BMP, CRP, and CK (weekly).    Appointments on [place] and/or [facility] (with [facility] or [facility] provider or service). Call [phone] if you haven't heard regarding these appointments within 7 days of discharge.     Activity - Up ad vitor     Diet    Follow this diet upon discharge: Regular diet       Significant Results and Procedures   Most Recent 3 CBC's:Recent Labs   Lab Test 07/11/23  0336 07/10/23  0718 07/09/23  0818   WBC 6.7 7.3 8.5   HGB 10.0* 10.8* 10.6*   MCV 99 99 99    159 197     Most Recent 3 BMP's:Recent Labs   Lab Test 07/10/23  2110 07/10/23  0718 07/09/23  0818 07/08/23  1707 07/08/23  0545   NA  --  140 141  --  139   POTASSIUM  --  4.1 3.7  --  3.5   CHLORIDE  --  106 106  --  105   CO2  --  26 26  --  25   BUN  --  10.6 6.5  --  8.2   CR  --  0.75 0.71  --  0.71   ANIONGAP  --  8 9  --  9   SILAS  --  8.6 8.6  --  8.3*   * 97 93   < > 105*    < > = values in this interval not displayed.   ,   Results for orders placed or performed during the hospital encounter of 07/04/23   XR Hand Left G/E 3 Views    Narrative    EXAM: XR HAND LEFT G/E 3 VIEWS  LOCATION: [facility]  DATE: 7/4/2023    INDICATION: Infection and swelling.  COMPARISON: None.      Impression    IMPRESSION: Soft tissue swelling in the metacarpal region, wrist and distal forearm. No soft tissue emphysema. No fractures are evident. Degenerative changes in the first CMC joint.   XR Forearm Left 2 Views    Narrative    EXAM: XR FOREARM LEFT 2 VIEWS  LOCATION: [facility]  DATE: 7/4/2023    INDICATION: Infection. Assess for soft tissue emphysema.  COMPARISON: 07/03/2023      Impression    IMPRESSION: Soft tissue swelling in the forearm has increased. No soft tissue fractures.   US Upper Extremity Venous Duplex Right     Value    Radiologist flags Nonocclusive DVT (Urgent)    Narrative    EXAMINATION: DOPPLER VENOUS ULTRASOUND OF THE RIGHT UPPER EXTREMITY,  7/7/2023 4:00 PM     COMPARISON: Correlation with ultrasound 7/3/2023.    HISTORY: DVT and right subclavian present for PICC placement?    TECHNIQUE:  Gray-scale evaluation with compression, spectral flow and  color Doppler assessment of the deep venous system of the right upper  extremity.    FINDINGS:  Right: The right internal jugular vein is partially compressible with  visible echogenic thrombus. The proximal right subclavian vein is  partially compressible. Additional partially occlusive echogenic  thrombus seen within the mid right subclavian vein. Normal blood flow  and waveforms are demonstrated in the innominate and axillary veins.  There is normal compressibility of the brachial, basilic and cephalic  veins.      Impression    IMPRESSION:  Nonocclusive deep venous thrombus is seen within the mid and proximal  right subclavian vein and right internal jugular vein.    [Urgent Result: Nonocclusive DVT]    Finding was identified on 7/7/2023 3:59 PM.     Rush Monteiro M.D. was contacted by Dr. Roberts at 7/7/2023 4:05  PM and verbalized understanding of the urgent finding.     I have personally reviewed the examination and initial interpretation  and I agree with the findings.    VY ARMAS MD         SYSTEM ID:  [id number]   IR CVC Tunnel Placement > 5 Yrs of Age    Narrative    PROCEDURE 7/10/2023 10:28 AM: Tunneled central venous catheter  placement     Procedural Personnel  Attending physician(s): DAVON Henry MD   Fellow physician(s): None  Resident physician(s): None  Advanced practice provider(s):  None    I, STEVEN ALVARADO MD, attest that I was present in the procedure room for  the entire procedure.    Procedure Date (mm/dd/yyyy): 7/10/2023 10:28 AM    Pre-procedure diagnosis: Short gut syndrome  Post-procedure diagnosis: Same  Indication: TPN and intravenous access  Additional clinical history: None    Complications: No immediate complications.      Impression    IMPRESSION:    1. Ultrasound guided left internal jugular venotomy.    2. 6 Japanese 26 cm double-lumen BD PowerLine tunneled central venous  catheter placed under fluoroscopic guidance with the tip in the  atriocaval junction. Both lumens heparin locked and ready for use.    _______________________________________________________________    PROCEDURE SUMMARY:  - Venous access with ultrasound guidance  - Tunneled central venous catheter insertion with fluoroscopic  guidance  - Additional procedure(s): None    PROCEDURE DETAILS:    Pre-procedure  Consent: Informed consent for the procedure including risks, benefits  and alternatives was obtained and time-out was performed prior to the  procedure.  Preparation (MIPS): The site was prepared and draped using all  elements of maximal sterile barrier technique including sterile  gloves, sterile gown, cap, mask, large sterile sheet, sterile  ultrasound probe cover, hand hygiene and cutaneous antisepsis with 2%  chlorhexidine.   Medical reason for site preparation exception (MIPS): Not applicable    Anesthesia/sedation  Level of anesthesia/sedation: Moderate sedation (conscious sedation)  Anesthesia/sedation administered by: Independent trained observer  under attending supervision with continuous monitoring of the  patient?s level of consciousness and physiologic status  Total intra-service sedation time (minutes): 21    Medications: 2 mg Versed and 100 mcg Fentanyl IV.    ATTENDING FACE-TO-FACE SEDATION TIME: 21 minutes    Access  Local anesthesia was administered. The vessel was sonographically  evaluated  and determined to be patent. Real time ultrasound was used  to visualize needle entry into the vessel and a permanent image was  stored.  Laterality: Left  Vein accessed: Internal jugular vein  Access technique: Micropuncture set with 21 gauge needle    Venography: Not performed    Catheter placement:    The left neck and upper chest were prepped and draped in the usual  sterile fashion. 1% lidocaine without epinephrine was used for local  anesthesia.    Under ultrasound guidance, left internal jugular venotomy was made  with a micropuncture needle. Ultrasound image documenting jugular  patency and needle venotomy was saved in the patient's record.    Micropuncture needle exchanged over guidewire for the micropuncture  sheath under fluoroscopic guidance. Catheter length measured with the  0.018 guidewire. Micropuncture sheath saline locked. A 6 Greek  double-lumen BD PowerLine tunneled central venous catheter was  subcutaneously tunneled from the left anterior chest to the left  internal jugular venotomy site. Catheter cut to length (26 cm marker).  Micropuncture sheath exchanged over guidewire for the peel-away  sheath. Guidewire removed. Under fluoroscopic guidance, the catheter  placed through the peel-away sheath and positioned with its tip in the  atriocaval junction. Peel-away sheath removed. Both lumens flushed and  aspirated adequately. Each lumen heparin locked with 10:1 heparin  solution. 2-0 nylon catheter retaining suture and sterile dressing  applied. Left internal jugular venotomy site closed with Dermabond  tissue adhesive. No immediate complication.    Fluoroscopic image documenting placement and position of the tunneled  central venous catheter was saved in the patient's record    Catheter placed: BD PowerLine  Lot number:  Catheter size (Greek): 6 Greek  Lumens: Dual-lumen   Power injectable: Yes  Catheter tip: cavoatrial junction  Catheter flush: Heparin (10 units/ml)    Closure  A sterile  dressing was applied.  Access site closure technique: Tissue adhesive  Catheter securement technique: Non-absorbable suture    Contrast: None administered    Radiation Dose  Fluoroscopy time: 1.0 minutes  Dose: 9.8 mGy    Additional Details  Additional description of procedure: None  Registry event: / /  Device used: None  Equipment details: None  Unique Device Identifiers: Not available  Specimens removed: None  Estimated blood loss (mL):  Standardized report: [email or web address]_v3.1    STEVEN ALVARADO MD         SYSTEM ID:  [id number]   Echo Complete     Value    LVEF  60-65%    Narrative    [id number]  [id number]  [id number]  [id number]^HERLINDA^INO     [facility],[place]  Echocardiography Laboratory  [address] [place] [address]     Name: SARA HASSAN  MRN: [id number]  : 1970  Study Date: 2023 03:18 PM  Age: 52 yrs  Gender: Male  Patient Location: [facility] [place]  Reason For Study: Endocarditis  Ordering Physician: INO PATE  Performed By: Astrid Portillo     BSA: 2.1 m2  Height: 71 in  Weight: 193 lb  ______________________________________________________________________________  Procedure  Complete Portable Echo Adult.  ______________________________________________________________________________  Interpretation Summary  Left ventricular size, wall motion and function are normal. The ejection  fraction is 60-65%.  Right ventricular function, chamber size, wall motion, and thickness are  normal.  IVC diameter and respiratory changes fall into an intermediate range  suggesting an RA pressure of 8 mmHg. No pericardial effusion is present.     There has been no change.  ______________________________________________________________________________  Left Ventricle  Left ventricular size, wall motion and function are normal. The ejection  fraction is 60-65%. Left ventricular diastolic function is indeterminate. No  regional wall motion abnormalities are seen.     Right  Ventricle  Right ventricular function, chamber size, wall motion, and thickness are  normal.     Atria  Both atria appear normal.     Mitral Valve  The mitral valve is normal. Trace mitral insufficiency is present.     Aortic Valve  Aortic valve is normal in structure and function. The aortic valve is  tricuspid. No aortic regurgitation is present.     Tricuspid Valve  The tricuspid valve is normal. Trace tricuspid insufficiency is present. The  peak velocity of the tricuspid regurgitant jet is not obtainable.     Pulmonic Valve  The pulmonic valve is normal.     Vessels  The aorta root is normal. Dilation of the inferior vena cava is present with  normal respiratory variation in diameter. IVC diameter and respiratory changes  fall into an intermediate range suggesting an RA pressure of 8 mmHg.     Pericardium  No pericardial effusion is present.     Compared to Previous Study  There has been no change.  ______________________________________________________________________________  MMode/2D Measurements & Calculations  IVSd: 0.84 cm  LVIDd: 4.9 cm  LVIDs: 3.4 cm  LVPWd: 0.98 cm  FS: 30.5 %  LV mass(C)d: 154.7 grams  LV mass(C)dI: 74.5 grams/m2  Ao root diam: 3.6 cm  asc Aorta Diam: 3.8 cm  LVOT diam: 2.6 cm  LVOT area: 5.3 cm2  LA Volume (BP): 57.4 ml     LA Volume Index (BP): 27.6 ml/m2  RWT: 0.40     Doppler Measurements & Calculations  MV E max david: 55.9 cm/sec  MV A max david: 73.9 cm/sec  MV E/A: 0.76  MV dec time: 0.20 sec  E/E' avg: 3.7  Lateral E/e': 2.8  Medial E/e': 4.5  RV S David: 12.9 cm/sec     ______________________________________________________________________________  Report approved by: Sudhir TRAVIS 07/06/2023 03:57 PM         Transesophageal Echocardiogram     Value    LVEF  55-60%    Narrative    [id number]  [facility]  [id number]  [id number]^PATE^[facility],[place]  Echocardiography Laboratory  [address] [place] [id number]     Name: MO  SARA LAL  MRN: [id number]  : 1970  Study Date: 2023 12:52 PM  Age: 52 yrs  Gender: Male  Patient [facility]  Reason For Study: Endocarditis  History: IVDA, MRSA bacteremia  Ordering Physician: INO PATE     BSA: 2.1 m2  Height: 71 in  Weight: 193 lb  HR: 50  BP: 120/80 mmHg  ______________________________________________________________________________  Interpretation Summary  No vegetation or mass identified.     All four valves are normal in structure and function.     ______________________________________________________________________________  Procedure  Transesophageal Echocardiogram with color and spectral Doppler performed.  Procedure location Operating Room. Informed consent for Transesophegeal echo  obtained. RONEL Probe #61 was used during the procedure. Sedation, endotracheal  intubation, and mechanical ventilation were initiated prior to the RONEL and  were monitored by anesthesia. The Transducer was inserted without difficulty .  The patient tolerated the procedure well. Complications None. The patient's  rhythm is normal sinus. Good quality two-dimensional was performed and  interpreted.     Left Ventricle  Global and regional left ventricular function is normal with an EF of 55-60%.  Left ventricular size is normal.     Right Ventricle  Right ventricular function, chamber size, wall motion, and thickness are  normal.     Atria  Both atria appear normal. The left atrial appendage is normal. It is free of  spontaneous echo contrast and thrombus. A small patent foramen ovale is  present. The patent foramen ovale was demonstrated by color Doppler .     Mitral Valve  The mitral valve is normal. Trace mitral insufficiency is present.     Aortic Valve  Aortic valve is normal in structure and function.     Tricuspid Valve  The tricuspid valve is normal.     Pulmonic Valve  The pulmonic valve is normal. Trace pulmonic insufficiency is present.     Vessels  The aorta root is normal. The  thoracic aorta is normal.     Pericardium  No pericardial effusion is present.     ______________________________________________________________________________  Report approved by: Shawanda COHEN 07/07/2023 01:42 PM     ______________________________________________________________________________        *Note: Due to a large number of results and/or encounters for the requested time period, some results have not been displayed. A complete set of results can be found in Results Review.       Discharge Medications   Discharge Medication List as of 7/11/2023 12:34 PM      START taking these medications    Details   DAPTOmycin 500 mg Inject 500 mg into the vein every 24 hours for 22 days, Disp-500 mg, R-0, Local Print         CONTINUE these medications which have NOT CHANGED    Details   albuterol (VENTOLIN HFA) 108 (90 Base) MCG/ACT inhaler Inhale 2 puffs into the lungs every 6 hours as needed, Disp-18 g, R-1, E-PrescribePharmacy may dispense brand covered by insurance (Proair, or proventil or ventolin or generic albuterol inhaler)      amphetamine-dextroamphetamine (ADDERALL) 20 MG tablet TAKE ONE TABLET BY MOUTH ONCE DAILY, Disp-30 tablet, R-0, E-Prescribe      carvedilol (COREG) 6.25 MG tablet TAKE 2 TABLETS (12.5 MG) BY MOUTH 2 TIMES DAILY WITH MEALS, Disp-60 tablet, R-3, E-Prescribe      cyanocobalamin (CYANOCOBALAMIN) 1000 MCG/ML injection INJECT 1 ML INTO THE MUSCLE EVERY 30 DAYS, Disp-1 mL, R-3, E-Prescribe      enoxaparin ANTICOAGULANT (LOVENOX) 80 MG/0.8ML syringe INJECT THE CONTENTS OF ONE SYRINGE (80MG) UNDER THE SKIN TWO TIMES A DAY, Disp-67.2 mL, R-0, E-Prescribe      fentaNYL (DURAGESIC) 25 mcg/hr 72 hr patch Place 1 patch onto the skin every 48 hours Fill 06/30/23 and start 07/2/23. 30 day supply for chronic pain., Disp-15 patch, R-0, E-Prescribe      lidocaine (LIDODERM) 5 % patch Place 1-2 patches onto the skin every 24 hours Wear for 12 hours, remove for 12 hours.  OK to cut to better fit to  "size.Disp-60 patch, [id number]      LORazepam (ATIVAN) 1 MG tablet Take 1 tablet (1 mg) by mouth every evening TAKE ONE TABLET BY MOUTH ONCE DAILY AS NEEDED FOR ANXIETY WITH TPN AND MEDICATIONS, Disp-30 tablet, R-0, E-Prescribe      naloxone (NARCAN) 4 MG/0.1ML nasal spray Spray 1 spray (4 mg) into one nostril alternating nostrils once as needed for opioid reversal every 2-3 minutes until assistance arrives, Disp-0.2 mL, R-0, E-Prescribe      nystatin (MYCOSTATIN) 591329 UNIT/GM external cream APPLY TOPICALLY 2 TIMES DAILYDisp-30 g, [id number]      ondansetron (ZOFRAN ODT) 8 MG ODT tab DISSOLVE ONE TABLET ON TONGUE EVERY 8 HOURS AS NEEDED FOR NAUSEA, Disp-90 tablet, R-1, E-Prescribe      oxyCODONE (ROXICODONE) 5 MG/5ML solution Take 5-10 mLs (5-10 mg) by mouth every 4 hours as needed for moderate to severe pain Max of 40mg/day. Fill 06/30/23 and start 07/2/23. 30 day supply for chronic pain., Disp-1200 mL, R-0, E-Prescribe      pantoprazole (PROTONIX) 40 MG EC tablet Take 1 tablet (40 mg) by mouth daily, Disp-30 tablet, R-5, E-Prescribe      polyethylene glycol (MIRALAX) 17 GM/Dose powder Take 17 g by mouth daily, Disp-1020 g, R-3, E-Prescribe      sucralfate (CARAFATE) 1 GM/10ML suspension TAKE 10MLS  BY MOUTH FOUR TIMES A DAY AS NEEDED, Disp-420 mL, R-1, E-Prescribe      [place] [facility] MANAGED PATIENT Contact [facility] for patient specific medication information at [phone] on admission and discharge from the hospital.  Phones are answered 24 hours a day 7 days a week 365 days a year.    Providers - Choose \"CONTINUE HOME MED (no scr ipt)\" at discharge if patient treatment with home infusion will continue., No Print OutResume prior to admission TPN/Lipids/saline      Syringe/Needle, Disp, (B-D ECLIPSE SYRINGE) 27G X 1/2\" 1 ML MISC 1 Device every 30 days, Disp-30 each, R-1, E-Prescribe      vitamin D2 (ERGOCALCIFEROL) 61205 units (1250 mcg) capsule Take 1 capsule (50,000 Units) by " mouth once a week, Disp-12 capsule, R-3, E-Prescribe         STOP taking these medications       clindamycin (CLEOCIN) 150 MG capsule Comments:   Reason for Stopping:         diphenhydrAMINE (BENADRYL) 12.5 MG/5ML solution Comments:   Reason for Stopping:         vancomycin (VANCOCIN) 1500 mg/250 mL IVPB Comments:   Reason for Stopping:             Allergies   Allergies   Allergen Reactions     Bactrim [Sulfamethoxazole-Trimethoprim] Rash     Penicillins Anaphylaxis     Please see Antimicrobial Management Team allergy assessment note 10/10/2018. Patient reported tolerating amoxicillin.  Tolerating cefepime and ceftriaxone without reaction 6/23     Ertapenem Nausea and Vomiting     Doxycycline Rash     Vancomycin Rash     Rash after receiving vancomycin 3/28/16 (infusion reaction?). Tolerated with slower infusion and diphenhydramine premed.  Tolerated 1250mg over 90minutes 7/2023.

## 2023-07-12 ENCOUNTER — PATIENT OUTREACH (OUTPATIENT)
Dept: CARE COORDINATION | Facility: CLINIC | Age: 53
End: 2023-07-12
Payer: COMMERCIAL

## 2023-07-12 LAB — BACTERIA BLD CULT: NO GROWTH

## 2023-07-12 ASSESSMENT — ACTIVITIES OF DAILY LIVING (ADL): DEPENDENT_IADLS:: TRANSPORTATION

## 2023-07-12 NOTE — LETTER
[facility]  [address] [facility] [address]  Phone: [phone]      July 13, 2023      Parker Acevedo  [address] [place] [address]    Dear Parker,    We have been trying to reach you.  The goal of care coordination is to help you manage your health and improve access to the [facility] system in the most efficient manner.  The Care Coordinator is a nurse who understands the healthcare system and will assist you in improving your access to care.     As your Physician and Care Coordinator we partner to help you achieve your health care goals.     We will continue to reach out; however, if you are able to call your Care Coordinator at [phone], that would be appreciated.  We at [facility] are focused on providing you with the highest-quality healthcare experience possible.      It is a pleasure to partner with you as we work towards achieving your optimal state of wellness.        Sincerely,    CHRISTY Abdul Paul D  [facility]   [address] [facility] [address]

## 2023-07-12 NOTE — PROGRESS NOTES
Clinic Care Coordination Contact  [facility]/Voicemail    Referral Source: IP Handoff  Clinical Data:   Date of Admission:  7/4/2023  Date of Discharge:  7/11/2023 7/4/2023 for left subclavian DVT, concurrent cellulitis, and a recent MRSE bacteremia - all most likely caused by his exisitng PICC line currently showing improvement of cellulitis and bacteremia on vancomycin regimen being discharged on Daptomycin.    Care Coordinator Outreach  Outreach attempted x 1.  Left message on patient's voicemail with call back information and requested return call.  Plan: . Care Coordinator will try to reach patient again in 1-2 business days.    [facility]   Greta Hung RN, Care Coordinator   [facility]'s   E-mail [email or web address]   [phone]

## 2023-07-13 DIAGNOSIS — Z09 HOSPITAL DISCHARGE FOLLOW-UP: ICD-10-CM

## 2023-07-13 NOTE — PROGRESS NOTES
Clinic Care Coordination Contact  [facility]/Voicemail    Referral Source: IP Handoff  Clinical Data: Care Coordinator Outreach  Outreach attempted x 2.  Left message on patient's voicemail with call back information and requested return call.  Plan: Care Coordinator will send unable to contact letter with care coordinator contact information via Info[employer]. Care Coordinator will try to reach patient again in 10 business days (as scheduled for monthly outreach).    Kinsey Muhammad RN Care Coordination   [facility]  Email: [email or web address]  Phone: [phone]

## 2023-07-14 ENCOUNTER — TELEPHONE (OUTPATIENT)
Dept: INTERNAL MEDICINE | Facility: CLINIC | Age: 53
End: 2023-07-14
Payer: COMMERCIAL

## 2023-07-14 ENCOUNTER — MEDICAL CORRESPONDENCE (OUTPATIENT)
Dept: HEALTH INFORMATION MANAGEMENT | Facility: CLINIC | Age: 53
End: 2023-07-14

## 2023-07-14 NOTE — TELEPHONE ENCOUNTER
Home Care is calling regarding an established patient with [facility].       Requesting orders from: Chuy Isaac  Provider is following patient: Yes  Is this a 60-day recertification request?  No    Orders Requested    Skilled Nursing  Request for initial certification (first set of orders)   Frequency:  1x/wk for 6 wks  2 prn  NA    Verbal orders given.  Home Care will send orders for provider to sign.  Confirmed ok to leave a detailed message with call back.  Contact information confirmed and updated as needed.    Ella Hammond RN

## 2023-07-15 NOTE — RESULT ENCOUNTER NOTE
Results reviewed by SOC triage MD. Blood cultures no growth (FINAL). No further action needed. CBC resulted while patient was still admitted and should have been addressed by inpatient team.

## 2023-07-18 ENCOUNTER — LAB REQUISITION (OUTPATIENT)
Dept: LAB | Facility: CLINIC | Age: 53
End: 2023-07-18
Payer: COMMERCIAL

## 2023-07-18 DIAGNOSIS — R13.0 APHAGIA: ICD-10-CM

## 2023-07-18 LAB
ALBUMIN SERPL BCG-MCNC: 3.8 G/DL (ref 3.5–5.2)
ALP SERPL-CCNC: 61 U/L (ref 40–129)
ALT SERPL W P-5'-P-CCNC: 14 U/L (ref 0–70)
ANION GAP SERPL CALCULATED.3IONS-SCNC: 10 MMOL/L (ref 7–15)
AST SERPL W P-5'-P-CCNC: 17 U/L (ref 0–45)
BASOPHILS # BLD AUTO: 0.1 10E3/UL (ref 0–0.2)
BASOPHILS NFR BLD AUTO: 1 %
BILIRUB DIRECT SERPL-MCNC: <0.2 MG/DL (ref 0–0.3)
BILIRUB SERPL-MCNC: 0.2 MG/DL
BUN SERPL-MCNC: 22.4 MG/DL (ref 6–20)
CALCIUM SERPL-MCNC: 8.6 MG/DL (ref 8.6–10)
CHLORIDE SERPL-SCNC: 106 MMOL/L (ref 98–107)
CK SERPL-CCNC: 141 U/L (ref 39–308)
CREAT SERPL-MCNC: 0.73 MG/DL (ref 0.67–1.17)
CRP SERPL-MCNC: <3 MG/L
DEPRECATED HCO3 PLAS-SCNC: 26 MMOL/L (ref 22–29)
EOSINOPHIL # BLD AUTO: 0.2 10E3/UL (ref 0–0.7)
EOSINOPHIL NFR BLD AUTO: 2 %
ERYTHROCYTE [DISTWIDTH] IN BLOOD BY AUTOMATED COUNT: 15.2 % (ref 10–15)
GFR SERPL CREATININE-BSD FRML MDRD: >90 ML/MIN/1.73M2
GLUCOSE SERPL-MCNC: 95 MG/DL (ref 70–99)
HCT VFR BLD AUTO: 33.1 % (ref 40–53)
HGB BLD-MCNC: 10.4 G/DL (ref 13.3–17.7)
HOLD SPECIMEN: NORMAL
IMM GRANULOCYTES # BLD: 0 10E3/UL
IMM GRANULOCYTES NFR BLD: 0 %
LYMPHOCYTES # BLD AUTO: 1.6 10E3/UL (ref 0.8–5.3)
LYMPHOCYTES NFR BLD AUTO: 17 %
MAGNESIUM SERPL-MCNC: 1.9 MG/DL (ref 1.7–2.3)
MCH RBC QN AUTO: 29.8 PG (ref 26.5–33)
MCHC RBC AUTO-ENTMCNC: 31.4 G/DL (ref 31.5–36.5)
MCV RBC AUTO: 95 FL (ref 78–100)
MONOCYTES # BLD AUTO: 0.9 10E3/UL (ref 0–1.3)
MONOCYTES NFR BLD AUTO: 10 %
NEUTROPHILS # BLD AUTO: 6.4 10E3/UL (ref 1.6–8.3)
NEUTROPHILS NFR BLD AUTO: 70 %
NRBC # BLD AUTO: 0 10E3/UL
NRBC BLD AUTO-RTO: 0 /100
PHOSPHATE SERPL-MCNC: 3.7 MG/DL (ref 2.5–4.5)
PLATELET # BLD AUTO: 244 10E3/UL (ref 150–450)
POTASSIUM SERPL-SCNC: 4 MMOL/L (ref 3.4–5.3)
PROT SERPL-MCNC: 6.8 G/DL (ref 6.4–8.3)
RBC # BLD AUTO: 3.49 10E6/UL (ref 4.4–5.9)
SODIUM SERPL-SCNC: 142 MMOL/L (ref 136–145)
TRIGL SERPL-MCNC: 47 MG/DL
WBC # BLD AUTO: 9.2 10E3/UL (ref 4–11)

## 2023-07-18 PROCEDURE — 84478 ASSAY OF TRIGLYCERIDES: CPT | Performed by: INTERNAL MEDICINE

## 2023-07-18 PROCEDURE — 82550 ASSAY OF CK (CPK): CPT | Performed by: INTERNAL MEDICINE

## 2023-07-18 PROCEDURE — 82248 BILIRUBIN DIRECT: CPT | Performed by: INTERNAL MEDICINE

## 2023-07-18 PROCEDURE — 83735 ASSAY OF MAGNESIUM: CPT | Performed by: INTERNAL MEDICINE

## 2023-07-18 PROCEDURE — 86140 C-REACTIVE PROTEIN: CPT | Performed by: INTERNAL MEDICINE

## 2023-07-18 PROCEDURE — 80053 COMPREHEN METABOLIC PANEL: CPT | Performed by: INTERNAL MEDICINE

## 2023-07-18 PROCEDURE — 84100 ASSAY OF PHOSPHORUS: CPT | Performed by: INTERNAL MEDICINE

## 2023-07-18 PROCEDURE — 85004 AUTOMATED DIFF WBC COUNT: CPT | Performed by: INTERNAL MEDICINE

## 2023-07-21 DIAGNOSIS — F90.0 ADHD (ATTENTION DEFICIT HYPERACTIVITY DISORDER), INATTENTIVE TYPE: ICD-10-CM

## 2023-07-21 RX ORDER — LORAZEPAM 1 MG/1
TABLET ORAL
Qty: 30 TABLET | Refills: 0 | Status: SHIPPED | OUTPATIENT
Start: 2023-07-21 | End: 2023-08-16

## 2023-07-21 RX ORDER — DEXTROAMPHETAMINE SACCHARATE, AMPHETAMINE ASPARTATE, DEXTROAMPHETAMINE SULFATE AND AMPHETAMINE SULFATE 5; 5; 5; 5 MG/1; MG/1; MG/1; MG/1
TABLET ORAL
Qty: 30 TABLET | Refills: 0 | Status: SHIPPED | OUTPATIENT
Start: 2023-07-21 | End: 2023-08-25

## 2023-07-25 ENCOUNTER — TELEPHONE (OUTPATIENT)
Dept: PALLIATIVE MEDICINE | Facility: CLINIC | Age: 53
End: 2023-07-25

## 2023-07-25 ENCOUNTER — LAB REQUISITION (OUTPATIENT)
Dept: LAB | Facility: CLINIC | Age: 53
End: 2023-07-25
Payer: COMMERCIAL

## 2023-07-25 DIAGNOSIS — R13.10 DYSPHAGIA, UNSPECIFIED: ICD-10-CM

## 2023-07-25 DIAGNOSIS — R13.10 PROBLEMS WITH SWALLOWING AND MASTICATION: Primary | ICD-10-CM

## 2023-07-25 LAB
ALBUMIN SERPL BCG-MCNC: 3.3 G/DL (ref 3.5–5.2)
ALP SERPL-CCNC: 62 U/L (ref 40–129)
ALT SERPL W P-5'-P-CCNC: 14 U/L (ref 0–70)
ANION GAP SERPL CALCULATED.3IONS-SCNC: 12 MMOL/L (ref 7–15)
AST SERPL W P-5'-P-CCNC: 15 U/L (ref 0–45)
BASOPHILS # BLD AUTO: 0.1 10E3/UL (ref 0–0.2)
BASOPHILS NFR BLD AUTO: 1 %
BILIRUB DIRECT SERPL-MCNC: <0.2 MG/DL (ref 0–0.3)
BILIRUB SERPL-MCNC: 0.2 MG/DL
BUN SERPL-MCNC: 7.9 MG/DL (ref 6–20)
CALCIUM SERPL-MCNC: 8.2 MG/DL (ref 8.6–10)
CHLORIDE SERPL-SCNC: 106 MMOL/L (ref 98–107)
CREAT SERPL-MCNC: 0.69 MG/DL (ref 0.67–1.17)
DEPRECATED HCO3 PLAS-SCNC: 22 MMOL/L (ref 22–29)
EOSINOPHIL # BLD AUTO: 0.3 10E3/UL (ref 0–0.7)
EOSINOPHIL NFR BLD AUTO: 3 %
ERYTHROCYTE [DISTWIDTH] IN BLOOD BY AUTOMATED COUNT: 14.7 % (ref 10–15)
FASTING STATUS PATIENT QL REPORTED: NO
GFR SERPL CREATININE-BSD FRML MDRD: >90 ML/MIN/1.73M2
GLUCOSE SERPL-MCNC: 106 MG/DL (ref 70–99)
HCT VFR BLD AUTO: 35.9 % (ref 40–53)
HGB BLD-MCNC: 11.3 G/DL (ref 13.3–17.7)
IMM GRANULOCYTES # BLD: 0 10E3/UL
IMM GRANULOCYTES NFR BLD: 0 %
LYMPHOCYTES # BLD AUTO: 2.3 10E3/UL (ref 0.8–5.3)
LYMPHOCYTES NFR BLD AUTO: 28 %
MAGNESIUM SERPL-MCNC: 2.1 MG/DL (ref 1.7–2.3)
MCH RBC QN AUTO: 29.2 PG (ref 26.5–33)
MCHC RBC AUTO-ENTMCNC: 31.5 G/DL (ref 31.5–36.5)
MCV RBC AUTO: 93 FL (ref 78–100)
MONOCYTES # BLD AUTO: 0.8 10E3/UL (ref 0–1.3)
MONOCYTES NFR BLD AUTO: 10 %
NEUTROPHILS # BLD AUTO: 4.6 10E3/UL (ref 1.6–8.3)
NEUTROPHILS NFR BLD AUTO: 58 %
NRBC # BLD AUTO: 0 10E3/UL
NRBC BLD AUTO-RTO: 0 /100
PHOSPHATE SERPL-MCNC: 3 MG/DL (ref 2.5–4.5)
PLATELET # BLD AUTO: 228 10E3/UL (ref 150–450)
POTASSIUM SERPL-SCNC: 3 MMOL/L (ref 3.4–5.3)
PROT SERPL-MCNC: 6.2 G/DL (ref 6.4–8.3)
RBC # BLD AUTO: 3.87 10E6/UL (ref 4.4–5.9)
SODIUM SERPL-SCNC: 140 MMOL/L (ref 136–145)
TRIGL SERPL-MCNC: 146 MG/DL
WBC # BLD AUTO: 8.1 10E3/UL (ref 4–11)

## 2023-07-25 PROCEDURE — 82248 BILIRUBIN DIRECT: CPT | Performed by: SURGERY

## 2023-07-25 PROCEDURE — 84100 ASSAY OF PHOSPHORUS: CPT | Performed by: SURGERY

## 2023-07-25 PROCEDURE — 80053 COMPREHEN METABOLIC PANEL: CPT | Performed by: SURGERY

## 2023-07-25 PROCEDURE — 84478 ASSAY OF TRIGLYCERIDES: CPT | Performed by: SURGERY

## 2023-07-25 PROCEDURE — 82550 ASSAY OF CK (CPK): CPT | Performed by: SURGERY

## 2023-07-25 PROCEDURE — 85014 HEMATOCRIT: CPT | Performed by: SURGERY

## 2023-07-25 PROCEDURE — 83735 ASSAY OF MAGNESIUM: CPT | Performed by: SURGERY

## 2023-07-25 NOTE — TELEPHONE ENCOUNTER
Received refill request for:    fentaNYL (DURAGESIC) 25 mcg/hr 72 hr patch      Last dispensed from pharmacy on 6/30/2023.    Patient's last office/virtual visit by prescribing provider on 5/23/2023.    Next office/virtual appointment scheduled for 8/23/2023.    Last urine drug screen date 11/9/2022.    Current opioid agreement on file? Yes Date of opioid agreement: 11/9/2022.    E-pres[place]:     [facility] [place] [address] [facility]    Will route to nursing [place] for review and preparation of prescription(s).

## 2023-07-25 NOTE — TELEPHONE ENCOUNTER
Received refill request for:    oxyCODONE (ROXICODONE) 5 MG/5ML solution      Last dispensed from pharmacy on 6/30/2023 per pharmacy.    Patient's last office/virtual visit by prescribing provider on 5/23/2023.    Next office/virtual appointment scheduled for 8/23/2023.    Last urine drug screen date 11/9/2022.    Current opioid agreement on file? Yes Date of opioid agreement: 11/9/2022.    E-prescribe to:     [facility] [place] - [address] [facility] [facility]    Will route to nursing [place] for review and preparation of prescription(s).

## 2023-07-25 NOTE — TELEPHONE ENCOUNTER
Refill request for Oxycodone 5 mg/5 ml soln and Fentanyl 25 mcg/hr patches  Last filled: Fentanyl patches and  Oxycodone 6/30 for 30 days    [facility]

## 2023-07-26 ENCOUNTER — MYC MEDICAL ADVICE (OUTPATIENT)
Dept: PALLIATIVE MEDICINE | Facility: CLINIC | Age: 53
End: 2023-07-26
Payer: COMMERCIAL

## 2023-07-26 DIAGNOSIS — T82.7XXA BACTEREMIA ASSOCIATED WITH INTRAVASCULAR LINE, INITIAL ENCOUNTER (H): Primary | ICD-10-CM

## 2023-07-26 DIAGNOSIS — G89.4 CHRONIC PAIN SYNDROME: ICD-10-CM

## 2023-07-26 DIAGNOSIS — R19.8 VISCERAL HYPERALGESIA: ICD-10-CM

## 2023-07-26 DIAGNOSIS — M54.2 CERVICALGIA: ICD-10-CM

## 2023-07-26 DIAGNOSIS — R78.81 BACTEREMIA ASSOCIATED WITH INTRAVASCULAR LINE, INITIAL ENCOUNTER (H): Primary | ICD-10-CM

## 2023-07-26 DIAGNOSIS — G89.29 CHRONIC BILATERAL LOW BACK PAIN WITHOUT SCIATICA: ICD-10-CM

## 2023-07-26 DIAGNOSIS — M54.50 CHRONIC BILATERAL LOW BACK PAIN WITHOUT SCIATICA: ICD-10-CM

## 2023-07-26 DIAGNOSIS — G89.29 CHRONIC ABDOMINAL PAIN: ICD-10-CM

## 2023-07-26 DIAGNOSIS — R10.9 CHRONIC ABDOMINAL PAIN: ICD-10-CM

## 2023-07-26 DIAGNOSIS — F11.90 CHRONIC, CONTINUOUS USE OF OPIOIDS: ICD-10-CM

## 2023-07-26 LAB — CK SERPL-CCNC: 62 U/L (ref 39–308)

## 2023-07-26 RX ORDER — OXYCODONE HCL 5 MG/5 ML
5-10 SOLUTION, ORAL ORAL EVERY 4 HOURS PRN
Qty: 1200 ML | Refills: 0 | Status: SHIPPED | OUTPATIENT
Start: 2023-07-26 | End: 2023-08-23

## 2023-07-26 RX ORDER — FENTANYL 25 UG/1
1 PATCH TRANSDERMAL
Qty: 15 PATCH | Refills: 0 | Status: SHIPPED | OUTPATIENT
Start: 2023-07-26 | End: 2023-08-23

## 2023-07-26 NOTE — TELEPHONE ENCOUNTER
Script Eprescribed to pharmacy  [place] Prescription Monitoring Program checked      Signed Prescriptions:                        Disp   Refills    fentaNYL (DURAGESIC) 25 mcg/hr 72 hr patch 15 pat*0        Sig: Place 1 patch onto the skin every 48 hours Fill           07/30/23 and start 08/01/23. 30 day supply for           chronic pain.  Authorizing Provider: SUSIE SMITH    oxyCODONE (ROXICODONE) 5 MG/5ML solution   1200 mL0        Sig: Take 5-10 mLs (5-10 mg) by mouth every 4 hours as           needed for moderate to severe pain Max of           40mg/day. Fill 07/30/23 and start 08/01/23. 30           day supply for chronic pain.  Authorizing Provider: SUSIE SMITH MD

## 2023-07-26 NOTE — TELEPHONE ENCOUNTER
Received call from patient requesting refill(s) of fentaNYL (DURAGESIC) 25 mcg/hr 72 hr patch   and   oxyCODONE (ROXICODONE) 5 MG/5ML solution     Last dispensed from pharmacy on 06/30/23 for both    Patient's last office/virtual visit by prescribing provider on 05/23/23  Next office/virtual appointment scheduled for 08/23/23    Last urine drug screen date 07/06/23  Current opioid agreement on file (completed within the last year) Yes Date of opioid agreement: 11/09/22    E-prescribe to pharmacy-  [facility] [place] - [address] [facility]     Will route to nursing pool for review and preparation of prescription(s).

## 2023-07-26 NOTE — TELEPHONE ENCOUNTER
Medication refill information reviewed.     Due date for fentaNYL (DURAGESIC) 25 mcg/hr 72 hr patch and oxyCODONE (ROXICODONE) 5 MG/5ML solution  is 08/01/23     Prescriptions prepped for review.     Will route to provider.

## 2023-07-26 NOTE — TELEPHONE ENCOUNTER
Please process a refill of fentaNYL (DURAGESIC) 25 mcg/hr 72 hr patch and oxyCODONE (ROXICODONE) 5 MG/5ML solution     [place], [place] - [address] [place] [address]  [address] [facility] [address]  Phone: [phone] Fax: [phone]

## 2023-07-27 NOTE — TELEPHONE ENCOUNTER
Medication refill information reviewed. This is a duplicate encounter. Closing    YADIRA LazarN, RN  Care Coordinator  [facility] [place]

## 2023-07-28 ENCOUNTER — PATIENT OUTREACH (OUTPATIENT)
Dept: CARE COORDINATION | Facility: CLINIC | Age: 53
End: 2023-07-28
Payer: COMMERCIAL

## 2023-07-28 NOTE — LETTER
[facility]  [address] [facility] [address]  Phone: [phone]      July 28, 2023      Parker Acevedo  [address] [place] [address]    Dear Parker,    We have been trying to reach you.  The goal of care coordination is to help you manage your health and improve access to the [facility] system in the most efficient manner.  The Care Coordinator is a nurse who understands the healthcare system and will assist you in improving your access to care.     As your Physician and Care Coordinator we partner to help you achieve your health care goals.     We will continue to reach out; however, if you are able to call your Care Coordinator at [phone], that would be appreciated.  We at [facility] are focused on providing you with the highest-quality healthcare experience possible.      It is a pleasure to partner with you as we work towards achieving your optimal state of wellness.        Sincerely,    CHRISTY Abdul Paul D  [facility]   [address] [facility] [address]

## 2023-07-28 NOTE — PROGRESS NOTES
Clinic Care Coordination Contact  [facility]/Voicemail       Clinical Data: Care Coordinator Outreach  Outreach attempted x 3.  Left message on patient's voicemail with call back information and requested return call.  Plan: Care Coordinator will send unable to contact letter with care coordinator contact information via TWIN[employer]X. Care Coordinator will send disenrollment letter if patient does not reach out within the next month.    Kinsey Muhammad RN Care Coordination   [facility]  Email: [email or web address]  Phone: [phone]

## 2023-08-01 ENCOUNTER — LAB REQUISITION (OUTPATIENT)
Dept: LAB | Facility: CLINIC | Age: 53
End: 2023-08-01
Payer: COMMERCIAL

## 2023-08-01 DIAGNOSIS — R13.10 DYSPHAGIA, UNSPECIFIED: ICD-10-CM

## 2023-08-01 LAB
ALBUMIN SERPL BCG-MCNC: 3.8 G/DL (ref 3.5–5.2)
ALP SERPL-CCNC: 60 U/L (ref 40–129)
ALT SERPL W P-5'-P-CCNC: 19 U/L (ref 0–70)
ANION GAP SERPL CALCULATED.3IONS-SCNC: 10 MMOL/L (ref 7–15)
AST SERPL W P-5'-P-CCNC: 23 U/L (ref 0–45)
BASOPHILS # BLD AUTO: 0.1 10E3/UL (ref 0–0.2)
BASOPHILS NFR BLD AUTO: 1 %
BILIRUB DIRECT SERPL-MCNC: <0.2 MG/DL (ref 0–0.3)
BILIRUB SERPL-MCNC: 0.3 MG/DL
BUN SERPL-MCNC: 18.7 MG/DL (ref 6–20)
CALCIUM SERPL-MCNC: 8.2 MG/DL (ref 8.6–10)
CHLORIDE SERPL-SCNC: 106 MMOL/L (ref 98–107)
CREAT SERPL-MCNC: 0.73 MG/DL (ref 0.67–1.17)
DEPRECATED HCO3 PLAS-SCNC: 26 MMOL/L (ref 22–29)
EOSINOPHIL # BLD AUTO: 0.3 10E3/UL (ref 0–0.7)
EOSINOPHIL NFR BLD AUTO: 4 %
ERYTHROCYTE [DISTWIDTH] IN BLOOD BY AUTOMATED COUNT: 15.6 % (ref 10–15)
GFR SERPL CREATININE-BSD FRML MDRD: >90 ML/MIN/1.73M2
GLUCOSE SERPL-MCNC: 99 MG/DL (ref 70–99)
HCT VFR BLD AUTO: 32.1 % (ref 40–53)
HGB BLD-MCNC: 10 G/DL (ref 13.3–17.7)
IMM GRANULOCYTES # BLD: 0 10E3/UL
IMM GRANULOCYTES NFR BLD: 0 %
LYMPHOCYTES # BLD AUTO: 2.3 10E3/UL (ref 0.8–5.3)
LYMPHOCYTES NFR BLD AUTO: 28 %
MAGNESIUM SERPL-MCNC: 2 MG/DL (ref 1.7–2.3)
MCH RBC QN AUTO: 29.4 PG (ref 26.5–33)
MCHC RBC AUTO-ENTMCNC: 31.2 G/DL (ref 31.5–36.5)
MCV RBC AUTO: 94 FL (ref 78–100)
MONOCYTES # BLD AUTO: 1.1 10E3/UL (ref 0–1.3)
MONOCYTES NFR BLD AUTO: 13 %
NEUTROPHILS # BLD AUTO: 4.5 10E3/UL (ref 1.6–8.3)
NEUTROPHILS NFR BLD AUTO: 54 %
NRBC # BLD AUTO: 0 10E3/UL
NRBC BLD AUTO-RTO: 0 /100
PHOSPHATE SERPL-MCNC: 3.2 MG/DL (ref 2.5–4.5)
PLATELET # BLD AUTO: 214 10E3/UL (ref 150–450)
POTASSIUM SERPL-SCNC: 3.5 MMOL/L (ref 3.4–5.3)
PROT SERPL-MCNC: 6.3 G/DL (ref 6.4–8.3)
RBC # BLD AUTO: 3.4 10E6/UL (ref 4.4–5.9)
SODIUM SERPL-SCNC: 142 MMOL/L (ref 136–145)
TRIGL SERPL-MCNC: 79 MG/DL
WBC # BLD AUTO: 8.2 10E3/UL (ref 4–11)

## 2023-08-01 PROCEDURE — 80053 COMPREHEN METABOLIC PANEL: CPT | Performed by: SURGERY

## 2023-08-01 PROCEDURE — 84478 ASSAY OF TRIGLYCERIDES: CPT | Performed by: SURGERY

## 2023-08-01 PROCEDURE — 85018 HEMOGLOBIN: CPT | Performed by: SURGERY

## 2023-08-01 PROCEDURE — 83735 ASSAY OF MAGNESIUM: CPT | Performed by: SURGERY

## 2023-08-01 PROCEDURE — 84100 ASSAY OF PHOSPHORUS: CPT | Performed by: SURGERY

## 2023-08-01 PROCEDURE — 82248 BILIRUBIN DIRECT: CPT | Performed by: SURGERY

## 2023-08-03 ENCOUNTER — TELEPHONE (OUTPATIENT)
Dept: INFECTIOUS DISEASES | Facility: CLINIC | Age: 53
End: 2023-08-03
Payer: COMMERCIAL

## 2023-08-03 ENCOUNTER — TELEPHONE (OUTPATIENT)
Dept: ENDOCRINOLOGY | Facility: CLINIC | Age: 53
End: 2023-08-03
Payer: COMMERCIAL

## 2023-08-03 NOTE — TELEPHONE ENCOUNTER
Pt's wife reports 100 F temp this morning and was sweating last night. Over the last 2 days has been sleeping a lot.   Azevedo looks great/flushes well.   No new/worsening/residual symptoms in left arm where clot was.     Spoke with Pt's wife. Since Pt has azevedo in place he should return to the ED for evaluation. Wife would take him in and understood rational.       Hosp course   High-grade MRSE bacteremia, CLABSI  Positive for methicillin-resistant Staphylococcus epidermidis (MRSE) by [employer] multiplex nucleic acid test.     Staphylococcus hominis-cath tip  Staphylococcus epidermidis     -Klebsiella pneumoniae   -Enterococcus faecalis   -Klebsiella oxytoca  Recent polymicrobial CLABSI (6/4/23)- K.pneumoniae, K.oxytoca, E.faecalis (Vancomycin + Ceftriaxone x7 days)

## 2023-08-03 NOTE — TELEPHONE ENCOUNTER
Matilde with [employer] [employer] [facility] calling regarding a plan of care for the patient. It was faxed over & hasn't been received back yet. Order needs to signed & faxed back to [phone].    [phone] Matilde

## 2023-08-03 NOTE — TELEPHONE ENCOUNTER
[facility]    Phone Message    May a detailed message be left on voicemail: yes     Reason for Call: Other: patient is off antibiotic and is running a fever again, please assist     Action Taken: Other: ID    Travel Screening: Not Applicable

## 2023-08-03 NOTE — TELEPHONE ENCOUNTER
Left message that plan of care was never received and requested they fax it again so it can be signed and returned.

## 2023-08-04 ENCOUNTER — APPOINTMENT (OUTPATIENT)
Dept: GENERAL RADIOLOGY | Facility: CLINIC | Age: 53
End: 2023-08-04
Attending: FAMILY MEDICINE
Payer: COMMERCIAL

## 2023-08-04 ENCOUNTER — HOSPITAL ENCOUNTER (EMERGENCY)
Facility: CLINIC | Age: 53
Discharge: HOME | End: 2023-08-04
Attending: FAMILY MEDICINE | Admitting: FAMILY MEDICINE
Payer: COMMERCIAL

## 2023-08-04 VITALS
DIASTOLIC BLOOD PRESSURE: 65 MMHG | SYSTOLIC BLOOD PRESSURE: 94 MMHG | HEART RATE: 93 BPM | TEMPERATURE: 99.4 F | RESPIRATION RATE: 20 BRPM | HEIGHT: 70 IN | OXYGEN SATURATION: 96 % | WEIGHT: 191 LBS | BODY MASS INDEX: 27.35 KG/M2

## 2023-08-04 DIAGNOSIS — J18.9 PNEUMONIA OF LEFT LOWER LOBE DUE TO INFECTIOUS ORGANISM: ICD-10-CM

## 2023-08-04 LAB
ACINETOBACTER SPECIES: NOT DETECTED
ALBUMIN SERPL BCG-MCNC: 3.6 G/DL (ref 3.5–5.2)
ALP SERPL-CCNC: 187 U/L (ref 40–129)
ALT SERPL W P-5'-P-CCNC: 211 U/L (ref 0–70)
ANION GAP SERPL CALCULATED.3IONS-SCNC: 10 MMOL/L (ref 7–15)
AST SERPL W P-5'-P-CCNC: 179 U/L (ref 0–45)
BASE EXCESS BLDV CALC-SCNC: 3.7 MMOL/L (ref -7.7–1.9)
BASOPHILS # BLD AUTO: 0 10E3/UL (ref 0–0.2)
BASOPHILS NFR BLD AUTO: 0 %
BILIRUB SERPL-MCNC: 0.3 MG/DL
BUN SERPL-MCNC: 9.8 MG/DL (ref 6–20)
CALCIUM SERPL-MCNC: 8.3 MG/DL (ref 8.6–10)
CHLORIDE SERPL-SCNC: 103 MMOL/L (ref 98–107)
CITROBACTER SPECIES: NOT DETECTED
CREAT SERPL-MCNC: 0.76 MG/DL (ref 0.67–1.17)
CTX-M: NOT DETECTED
DEPRECATED HCO3 PLAS-SCNC: 26 MMOL/L (ref 22–29)
ENTEROBACTER SPECIES: NOT DETECTED
EOSINOPHIL # BLD AUTO: 0.1 10E3/UL (ref 0–0.7)
EOSINOPHIL NFR BLD AUTO: 1 %
ERYTHROCYTE [DISTWIDTH] IN BLOOD BY AUTOMATED COUNT: 16.1 % (ref 10–15)
ESCHERICHIA COLI: NOT DETECTED
FLUAV RNA SPEC QL NAA+PROBE: NEGATIVE
FLUBV RNA RESP QL NAA+PROBE: NEGATIVE
GFR SERPL CREATININE-BSD FRML MDRD: >90 ML/MIN/1.73M2
GLUCOSE SERPL-MCNC: 103 MG/DL (ref 70–99)
HCO3 BLDV-SCNC: 29 MMOL/L (ref 21–28)
HCT VFR BLD AUTO: 34.6 % (ref 40–53)
HGB BLD-MCNC: 11 G/DL (ref 13.3–17.7)
IMM GRANULOCYTES # BLD: 0 10E3/UL
IMM GRANULOCYTES NFR BLD: 0 %
IMP: NOT DETECTED
KLEBSIELLA OXYTOCA: NOT DETECTED
KLEBSIELLA PNEUMONIAE: DETECTED
KPC: NOT DETECTED
LACTATE SERPL-SCNC: 0.8 MMOL/L (ref 0.7–2)
LYMPHOCYTES # BLD AUTO: 0.3 10E3/UL (ref 0.8–5.3)
LYMPHOCYTES NFR BLD AUTO: 3 %
MCH RBC QN AUTO: 29.8 PG (ref 26.5–33)
MCHC RBC AUTO-ENTMCNC: 31.8 G/DL (ref 31.5–36.5)
MCV RBC AUTO: 94 FL (ref 78–100)
MONOCYTES # BLD AUTO: 0.2 10E3/UL (ref 0–1.3)
MONOCYTES NFR BLD AUTO: 2 %
NDM: NOT DETECTED
NEUTROPHILS # BLD AUTO: 8.9 10E3/UL (ref 1.6–8.3)
NEUTROPHILS NFR BLD AUTO: 94 %
NRBC # BLD AUTO: 0 10E3/UL
NRBC BLD AUTO-RTO: 0 /100
O2/TOTAL GAS SETTING VFR VENT: 21 %
OXA (DETECTED/NOT DETECTED): NOT DETECTED
PCO2 BLDV: 45 MM HG (ref 40–50)
PH BLDV: 7.41 [PH] (ref 7.32–7.43)
PLATELET # BLD AUTO: 155 10E3/UL (ref 150–450)
PO2 BLDV: 37 MM HG (ref 25–47)
POTASSIUM SERPL-SCNC: 3.4 MMOL/L (ref 3.4–5.3)
PROCALCITONIN SERPL IA-MCNC: 2.49 NG/ML
PROT SERPL-MCNC: 6.5 G/DL (ref 6.4–8.3)
PROTEUS SPECIES: NOT DETECTED
PSEUDOMONAS AERUGINOSA: NOT DETECTED
RBC # BLD AUTO: 3.69 10E6/UL (ref 4.4–5.9)
RSV RNA SPEC NAA+PROBE: NEGATIVE
SARS-COV-2 RNA RESP QL NAA+PROBE: NEGATIVE
SODIUM SERPL-SCNC: 139 MMOL/L (ref 136–145)
VIM: NOT DETECTED
WBC # BLD AUTO: 9.5 10E3/UL (ref 4–11)

## 2023-08-04 PROCEDURE — 96367 TX/PROPH/DG ADDL SEQ IV INF: CPT

## 2023-08-04 PROCEDURE — 87149 DNA/RNA DIRECT PROBE: CPT | Performed by: FAMILY MEDICINE

## 2023-08-04 PROCEDURE — 96365 THER/PROPH/DIAG IV INF INIT: CPT

## 2023-08-04 PROCEDURE — 36415 COLL VENOUS BLD VENIPUNCTURE: CPT | Performed by: FAMILY MEDICINE

## 2023-08-04 PROCEDURE — 82803 BLOOD GASES ANY COMBINATION: CPT | Performed by: FAMILY MEDICINE

## 2023-08-04 PROCEDURE — 99284 EMERGENCY DEPT VISIT MOD MDM: CPT | Mod: 25

## 2023-08-04 PROCEDURE — 250N000011 HC RX IP 250 OP 636: Mod: JZ | Performed by: FAMILY MEDICINE

## 2023-08-04 PROCEDURE — 87077 CULTURE AEROBIC IDENTIFY: CPT | Performed by: FAMILY MEDICINE

## 2023-08-04 PROCEDURE — 80053 COMPREHEN METABOLIC PANEL: CPT | Performed by: FAMILY MEDICINE

## 2023-08-04 PROCEDURE — 83605 ASSAY OF LACTIC ACID: CPT | Performed by: FAMILY MEDICINE

## 2023-08-04 PROCEDURE — 84145 PROCALCITONIN (PCT): CPT | Performed by: FAMILY MEDICINE

## 2023-08-04 PROCEDURE — 71046 X-RAY EXAM CHEST 2 VIEWS: CPT

## 2023-08-04 PROCEDURE — 87637 SARSCOV2&INF A&B&RSV AMP PRB: CPT | Performed by: FAMILY MEDICINE

## 2023-08-04 PROCEDURE — 85025 COMPLETE CBC W/AUTO DIFF WBC: CPT | Performed by: FAMILY MEDICINE

## 2023-08-04 PROCEDURE — 99284 EMERGENCY DEPT VISIT MOD MDM: CPT | Performed by: FAMILY MEDICINE

## 2023-08-04 RX ORDER — CEFTRIAXONE 1 G/1
1 INJECTION, POWDER, FOR SOLUTION INTRAMUSCULAR; INTRAVENOUS ONCE
Status: COMPLETED | OUTPATIENT
Start: 2023-08-04 | End: 2023-08-04

## 2023-08-04 RX ORDER — ONDANSETRON 4 MG/1
4 TABLET, ORALLY DISINTEGRATING ORAL ONCE
Status: COMPLETED | OUTPATIENT
Start: 2023-08-04 | End: 2023-08-04

## 2023-08-04 RX ORDER — CEFDINIR 250 MG/5ML
600 POWDER, FOR SUSPENSION ORAL DAILY
Qty: 120 ML | Refills: 0 | Status: ON HOLD | OUTPATIENT
Start: 2023-08-05 | End: 2023-08-07

## 2023-08-04 RX ORDER — AZITHROMYCIN 500 MG/1
500 INJECTION, POWDER, LYOPHILIZED, FOR SOLUTION INTRAVENOUS ONCE
Status: COMPLETED | OUTPATIENT
Start: 2023-08-04 | End: 2023-08-04

## 2023-08-04 RX ORDER — AZITHROMYCIN 200 MG/5ML
250 POWDER, FOR SUSPENSION ORAL DAILY
Qty: 25 ML | Refills: 0 | Status: ON HOLD | OUTPATIENT
Start: 2023-08-05 | End: 2023-08-07

## 2023-08-04 RX ADMIN — AZITHROMYCIN MONOHYDRATE 500 MG: 500 INJECTION, POWDER, LYOPHILIZED, FOR SOLUTION INTRAVENOUS at 04:58

## 2023-08-04 RX ADMIN — ONDANSETRON 4 MG: 4 TABLET, ORALLY DISINTEGRATING ORAL at 05:06

## 2023-08-04 RX ADMIN — CEFTRIAXONE SODIUM 1 G: 1 INJECTION, POWDER, FOR SOLUTION INTRAMUSCULAR; INTRAVENOUS at 04:46

## 2023-08-04 ASSESSMENT — ACTIVITIES OF DAILY LIVING (ADL)
ADLS_ACUITY_SCORE: 37
ADLS_ACUITY_SCORE: 37

## 2023-08-04 NOTE — DISCHARGE INSTRUCTIONS
Please read and follow the handout(s) instructions. Return, if needed, for increased or worsening symptoms and as directed by the handout(s).    Yogurt orally twice a day while on the antibiotics may help prevent diarrhea and secondary infections caused by the antibiotic use.

## 2023-08-04 NOTE — ED TRIAGE NOTES
Triage Assessment       Row Name 08/04/23 0243       Triage Assessment (Adult)    Airway WDL WDL       Respiratory WDL    Respiratory WDL WDL                  Has been feeling dizzy and running a fever the last two days

## 2023-08-04 NOTE — ED PROVIDER NOTES
Patient's blood culture came back positive for gram-negative bacilli.  I called and spoke with the patient's wife and recommended he return to the emergency department for reassessment and additional antibiotic coverage since he was discharged on azithromycin only for pneumonia.  Wife stated that she would bring him down to the [facility] where his infectious disease doctors are.    2:47 PM   August 4, 2023  BRITTANY Suazo, Pinky Brunner PA-C  08/04/23 1441

## 2023-08-04 NOTE — ED NOTES
Pt c/o nausea. States this is not a new symptom and takes zofran at home; last dose about 2pm yesterday. MD updated, orders received.

## 2023-08-06 ENCOUNTER — APPOINTMENT (OUTPATIENT)
Dept: GENERAL RADIOLOGY | Facility: CLINIC | Age: 53
DRG: 314 | End: 2023-08-06
Attending: FAMILY MEDICINE
Payer: COMMERCIAL

## 2023-08-06 ENCOUNTER — HOSPITAL ENCOUNTER (INPATIENT)
Facility: CLINIC | Age: 53
LOS: 1 days | Discharge: HOME | DRG: 314 | End: 2023-08-07
Attending: FAMILY MEDICINE | Admitting: STUDENT IN AN ORGANIZED HEALTH CARE EDUCATION/TRAINING PROGRAM
Payer: COMMERCIAL

## 2023-08-06 DIAGNOSIS — R78.81 GRAM-NEGATIVE BACTEREMIA: ICD-10-CM

## 2023-08-06 DIAGNOSIS — R78.81 BACTEREMIA: Primary | ICD-10-CM

## 2023-08-06 DIAGNOSIS — T80.211A BLOODSTREAM INFECTION ASSOCIATED WITH CENTRAL VENOUS CATHETER, INITIAL ENCOUNTER: ICD-10-CM

## 2023-08-06 LAB
ALBUMIN SERPL BCG-MCNC: 3.7 G/DL (ref 3.5–5.2)
ALBUMIN UR-MCNC: NEGATIVE MG/DL
ALP SERPL-CCNC: 125 U/L (ref 40–129)
ALT SERPL W P-5'-P-CCNC: 89 U/L (ref 0–70)
ANION GAP SERPL CALCULATED.3IONS-SCNC: 10 MMOL/L (ref 7–15)
APPEARANCE UR: CLEAR
AST SERPL W P-5'-P-CCNC: 24 U/L (ref 0–45)
BASOPHILS # BLD AUTO: 0 10E3/UL (ref 0–0.2)
BASOPHILS NFR BLD AUTO: 0 %
BILIRUB SERPL-MCNC: 0.2 MG/DL
BILIRUB UR QL STRIP: NEGATIVE
BUN SERPL-MCNC: 9.2 MG/DL (ref 6–20)
CALCIUM SERPL-MCNC: 8.7 MG/DL (ref 8.6–10)
CHLORIDE SERPL-SCNC: 106 MMOL/L (ref 98–107)
COLOR UR AUTO: ABNORMAL
CREAT SERPL-MCNC: 0.71 MG/DL (ref 0.67–1.17)
DEPRECATED HCO3 PLAS-SCNC: 25 MMOL/L (ref 22–29)
EOSINOPHIL # BLD AUTO: 0.4 10E3/UL (ref 0–0.7)
EOSINOPHIL NFR BLD AUTO: 5 %
ERYTHROCYTE [DISTWIDTH] IN BLOOD BY AUTOMATED COUNT: 15.9 % (ref 10–15)
GFR SERPL CREATININE-BSD FRML MDRD: >90 ML/MIN/1.73M2
GLUCOSE SERPL-MCNC: 100 MG/DL (ref 70–99)
GLUCOSE UR STRIP-MCNC: NEGATIVE MG/DL
HCT VFR BLD AUTO: 34.3 % (ref 40–53)
HGB BLD-MCNC: 10.6 G/DL (ref 13.3–17.7)
HGB UR QL STRIP: ABNORMAL
HOLD SPECIMEN: NORMAL
IMM GRANULOCYTES # BLD: 0 10E3/UL
IMM GRANULOCYTES NFR BLD: 0 %
KETONES UR STRIP-MCNC: NEGATIVE MG/DL
LACTATE SERPL-SCNC: 0.6 MMOL/L (ref 0.7–2)
LEUKOCYTE ESTERASE UR QL STRIP: NEGATIVE
LYMPHOCYTES # BLD AUTO: 1.9 10E3/UL (ref 0.8–5.3)
LYMPHOCYTES NFR BLD AUTO: 28 %
MCH RBC QN AUTO: 29.2 PG (ref 26.5–33)
MCHC RBC AUTO-ENTMCNC: 30.9 G/DL (ref 31.5–36.5)
MCV RBC AUTO: 95 FL (ref 78–100)
MONOCYTES # BLD AUTO: 1.1 10E3/UL (ref 0–1.3)
MONOCYTES NFR BLD AUTO: 17 %
MUCOUS THREADS #/AREA URNS LPF: PRESENT /LPF
NEUTROPHILS # BLD AUTO: 3.3 10E3/UL (ref 1.6–8.3)
NEUTROPHILS NFR BLD AUTO: 50 %
NITRATE UR QL: NEGATIVE
NRBC # BLD AUTO: 0 10E3/UL
NRBC BLD AUTO-RTO: 0 /100
PH UR STRIP: 7 [PH] (ref 5–7)
PLATELET # BLD AUTO: 160 10E3/UL (ref 150–450)
POTASSIUM SERPL-SCNC: 3.5 MMOL/L (ref 3.4–5.3)
PROCALCITONIN SERPL IA-MCNC: 5.26 NG/ML
PROT SERPL-MCNC: 6.7 G/DL (ref 6.4–8.3)
RBC # BLD AUTO: 3.63 10E6/UL (ref 4.4–5.9)
RBC URINE: 2 /HPF
SODIUM SERPL-SCNC: 141 MMOL/L (ref 136–145)
SP GR UR STRIP: 1.01 (ref 1–1.03)
SQUAMOUS EPITHELIAL: <1 /HPF
UROBILINOGEN UR STRIP-MCNC: NORMAL MG/DL
WBC # BLD AUTO: 6.8 10E3/UL (ref 4–11)
WBC URINE: 1 /HPF

## 2023-08-06 PROCEDURE — 250N000011 HC RX IP 250 OP 636: Performed by: PEDIATRICS

## 2023-08-06 PROCEDURE — 71046 X-RAY EXAM CHEST 2 VIEWS: CPT

## 2023-08-06 PROCEDURE — 250N000013 HC RX MED GY IP 250 OP 250 PS 637

## 2023-08-06 PROCEDURE — 36415 COLL VENOUS BLD VENIPUNCTURE: CPT | Performed by: EMERGENCY MEDICINE

## 2023-08-06 PROCEDURE — 99285 EMERGENCY DEPT VISIT HI MDM: CPT | Mod: 25 | Performed by: FAMILY MEDICINE

## 2023-08-06 PROCEDURE — 71046 X-RAY EXAM CHEST 2 VIEWS: CPT | Mod: 26 | Performed by: RADIOLOGY

## 2023-08-06 PROCEDURE — 81001 URINALYSIS AUTO W/SCOPE: CPT | Performed by: FAMILY MEDICINE

## 2023-08-06 PROCEDURE — 120N000002 HC R&B MED SURG/OB UMMC

## 2023-08-06 PROCEDURE — 258N000003 HC RX IP 258 OP 636: Performed by: FAMILY MEDICINE

## 2023-08-06 PROCEDURE — 84145 PROCALCITONIN (PCT): CPT | Performed by: EMERGENCY MEDICINE

## 2023-08-06 PROCEDURE — 3E0436Z INTRODUCTION OF NUTRITIONAL SUBSTANCE INTO CENTRAL VEIN, PERCUTANEOUS APPROACH: ICD-10-PCS | Performed by: STUDENT IN AN ORGANIZED HEALTH CARE EDUCATION/TRAINING PROGRAM

## 2023-08-06 PROCEDURE — 80053 COMPREHEN METABOLIC PANEL: CPT | Performed by: FAMILY MEDICINE

## 2023-08-06 PROCEDURE — 250N000013 HC RX MED GY IP 250 OP 250 PS 637: Performed by: PEDIATRICS

## 2023-08-06 PROCEDURE — 250N000011 HC RX IP 250 OP 636: Mod: JZ

## 2023-08-06 PROCEDURE — 250N000009 HC RX 250: Performed by: STUDENT IN AN ORGANIZED HEALTH CARE EDUCATION/TRAINING PROGRAM

## 2023-08-06 PROCEDURE — 258N000003 HC RX IP 258 OP 636

## 2023-08-06 PROCEDURE — 83605 ASSAY OF LACTIC ACID: CPT | Performed by: EMERGENCY MEDICINE

## 2023-08-06 PROCEDURE — 85025 COMPLETE CBC W/AUTO DIFF WBC: CPT | Performed by: EMERGENCY MEDICINE

## 2023-08-06 PROCEDURE — 85025 COMPLETE CBC W/AUTO DIFF WBC: CPT | Performed by: FAMILY MEDICINE

## 2023-08-06 PROCEDURE — 99222 1ST HOSP IP/OBS MODERATE 55: CPT | Mod: AI | Performed by: STUDENT IN AN ORGANIZED HEALTH CARE EDUCATION/TRAINING PROGRAM

## 2023-08-06 PROCEDURE — 87040 BLOOD CULTURE FOR BACTERIA: CPT | Performed by: FAMILY MEDICINE

## 2023-08-06 PROCEDURE — 99285 EMERGENCY DEPT VISIT HI MDM: CPT | Performed by: FAMILY MEDICINE

## 2023-08-06 PROCEDURE — 250N000011 HC RX IP 250 OP 636: Performed by: FAMILY MEDICINE

## 2023-08-06 RX ORDER — SODIUM CHLORIDE 9 MG/ML
INJECTION, SOLUTION INTRAVENOUS
Status: COMPLETED
Start: 2023-08-06 | End: 2023-08-06

## 2023-08-06 RX ORDER — POLYETHYLENE GLYCOL 3350 17 G/17G
17 POWDER, FOR SOLUTION ORAL DAILY
Status: DISCONTINUED | OUTPATIENT
Start: 2023-08-06 | End: 2023-08-06

## 2023-08-06 RX ORDER — CEFEPIME HYDROCHLORIDE 2 G/1
2 INJECTION, POWDER, FOR SOLUTION INTRAVENOUS ONCE
Status: COMPLETED | OUTPATIENT
Start: 2023-08-06 | End: 2023-08-06

## 2023-08-06 RX ORDER — ENOXAPARIN SODIUM 100 MG/ML
80 INJECTION SUBCUTANEOUS 2 TIMES DAILY
Status: DISCONTINUED | OUTPATIENT
Start: 2023-08-06 | End: 2023-08-07 | Stop reason: HOSPADM

## 2023-08-06 RX ORDER — PANTOPRAZOLE SODIUM 40 MG/1
40 TABLET, DELAYED RELEASE ORAL DAILY
Status: DISCONTINUED | OUTPATIENT
Start: 2023-08-06 | End: 2023-08-06

## 2023-08-06 RX ORDER — ONDANSETRON 4 MG/1
8 TABLET, ORALLY DISINTEGRATING ORAL EVERY 6 HOURS PRN
Status: DISCONTINUED | OUTPATIENT
Start: 2023-08-06 | End: 2023-08-07 | Stop reason: HOSPADM

## 2023-08-06 RX ORDER — LORAZEPAM 0.5 MG/1
1 TABLET ORAL AT BEDTIME
Status: DISCONTINUED | OUTPATIENT
Start: 2023-08-06 | End: 2023-08-07 | Stop reason: HOSPADM

## 2023-08-06 RX ORDER — ERGOCALCIFEROL 1.25 MG/1
50000 CAPSULE, LIQUID FILLED ORAL WEEKLY
Status: DISCONTINUED | OUTPATIENT
Start: 2023-08-06 | End: 2023-08-07 | Stop reason: HOSPADM

## 2023-08-06 RX ORDER — DEXTROSE MONOHYDRATE 100 MG/ML
INJECTION, SOLUTION INTRAVENOUS CONTINUOUS PRN
Status: DISCONTINUED | OUTPATIENT
Start: 2023-08-06 | End: 2023-08-07 | Stop reason: HOSPADM

## 2023-08-06 RX ORDER — OXYCODONE HCL 5 MG/5 ML
5-10 SOLUTION, ORAL ORAL EVERY 4 HOURS PRN
Status: DISCONTINUED | OUTPATIENT
Start: 2023-08-06 | End: 2023-08-07 | Stop reason: HOSPADM

## 2023-08-06 RX ORDER — POLYETHYLENE GLYCOL 3350 17 G/17G
17 POWDER, FOR SOLUTION ORAL DAILY PRN
Status: DISCONTINUED | OUTPATIENT
Start: 2023-08-06 | End: 2023-08-07 | Stop reason: HOSPADM

## 2023-08-06 RX ORDER — ONDANSETRON 2 MG/ML
4 INJECTION INTRAMUSCULAR; INTRAVENOUS EVERY 6 HOURS PRN
Status: DISCONTINUED | OUTPATIENT
Start: 2023-08-06 | End: 2023-08-07 | Stop reason: HOSPADM

## 2023-08-06 RX ORDER — OXYCODONE HCL 5 MG/5 ML
10 SOLUTION, ORAL ORAL ONCE
Status: COMPLETED | OUTPATIENT
Start: 2023-08-06 | End: 2023-08-06

## 2023-08-06 RX ORDER — NYSTATIN 100000 U/G
CREAM TOPICAL 2 TIMES DAILY
Status: DISCONTINUED | OUTPATIENT
Start: 2023-08-06 | End: 2023-08-07 | Stop reason: HOSPADM

## 2023-08-06 RX ORDER — CARVEDILOL 6.25 MG/1
6.25 TABLET ORAL 2 TIMES DAILY WITH MEALS
Status: DISCONTINUED | OUTPATIENT
Start: 2023-08-06 | End: 2023-08-07 | Stop reason: HOSPADM

## 2023-08-06 RX ORDER — CYANOCOBALAMIN 1000 UG/ML
1000 INJECTION, SOLUTION INTRAMUSCULAR; SUBCUTANEOUS
Status: DISCONTINUED | OUTPATIENT
Start: 2023-08-06 | End: 2023-08-07 | Stop reason: HOSPADM

## 2023-08-06 RX ORDER — DEXTROAMPHETAMINE SACCHARATE, AMPHETAMINE ASPARTATE, DEXTROAMPHETAMINE SULFATE AND AMPHETAMINE SULFATE 5; 5; 5; 5 MG/1; MG/1; MG/1; MG/1
20 TABLET ORAL DAILY
Status: DISCONTINUED | OUTPATIENT
Start: 2023-08-07 | End: 2023-08-07 | Stop reason: HOSPADM

## 2023-08-06 RX ORDER — ACETAMINOPHEN 650 MG/1
650 SUPPOSITORY RECTAL EVERY 6 HOURS PRN
Status: DISCONTINUED | OUTPATIENT
Start: 2023-08-06 | End: 2023-08-07

## 2023-08-06 RX ORDER — LIDOCAINE 50 MG/G
1-2 PATCH TOPICAL
Status: DISCONTINUED | OUTPATIENT
Start: 2023-08-06 | End: 2023-08-07 | Stop reason: HOSPADM

## 2023-08-06 RX ORDER — PROCHLORPERAZINE 25 MG
25 SUPPOSITORY, RECTAL RECTAL EVERY 12 HOURS PRN
Status: DISCONTINUED | OUTPATIENT
Start: 2023-08-06 | End: 2023-08-07 | Stop reason: HOSPADM

## 2023-08-06 RX ORDER — SUCRALFATE ORAL 1 G/10ML
1 SUSPENSION ORAL
Status: DISCONTINUED | OUTPATIENT
Start: 2023-08-06 | End: 2023-08-07 | Stop reason: HOSPADM

## 2023-08-06 RX ORDER — PROCHLORPERAZINE MALEATE 5 MG
10 TABLET ORAL EVERY 6 HOURS PRN
Status: DISCONTINUED | OUTPATIENT
Start: 2023-08-06 | End: 2023-08-07 | Stop reason: HOSPADM

## 2023-08-06 RX ORDER — FENTANYL 25 UG/1
25 PATCH TRANSDERMAL
Status: DISCONTINUED | OUTPATIENT
Start: 2023-08-06 | End: 2023-08-07 | Stop reason: HOSPADM

## 2023-08-06 RX ORDER — ONDANSETRON 4 MG/1
4 TABLET, ORALLY DISINTEGRATING ORAL EVERY 6 HOURS PRN
Status: DISCONTINUED | OUTPATIENT
Start: 2023-08-06 | End: 2023-08-06

## 2023-08-06 RX ORDER — CEFEPIME HYDROCHLORIDE 2 G/1
2 INJECTION, POWDER, FOR SOLUTION INTRAVENOUS EVERY 8 HOURS
Status: DISCONTINUED | OUTPATIENT
Start: 2023-08-06 | End: 2023-08-07 | Stop reason: HOSPADM

## 2023-08-06 RX ORDER — AMOXICILLIN 250 MG
1 CAPSULE ORAL 2 TIMES DAILY PRN
Status: DISCONTINUED | OUTPATIENT
Start: 2023-08-06 | End: 2023-08-07 | Stop reason: HOSPADM

## 2023-08-06 RX ORDER — AMOXICILLIN 250 MG
2 CAPSULE ORAL 2 TIMES DAILY PRN
Status: DISCONTINUED | OUTPATIENT
Start: 2023-08-06 | End: 2023-08-07 | Stop reason: HOSPADM

## 2023-08-06 RX ORDER — ACETAMINOPHEN 325 MG/1
650 TABLET ORAL EVERY 6 HOURS PRN
Status: DISCONTINUED | OUTPATIENT
Start: 2023-08-06 | End: 2023-08-07

## 2023-08-06 RX ORDER — ALBUTEROL SULFATE 90 UG/1
2 AEROSOL, METERED RESPIRATORY (INHALATION) EVERY 6 HOURS PRN
Status: DISCONTINUED | OUTPATIENT
Start: 2023-08-06 | End: 2023-08-07 | Stop reason: HOSPADM

## 2023-08-06 RX ADMIN — CEFEPIME HYDROCHLORIDE 2 G: 2 INJECTION, POWDER, FOR SOLUTION INTRAVENOUS at 08:22

## 2023-08-06 RX ADMIN — SODIUM CHLORIDE 1000 ML: 9 INJECTION, SOLUTION INTRAVENOUS at 08:17

## 2023-08-06 RX ADMIN — CEFEPIME HYDROCHLORIDE 2 G: 2 INJECTION, POWDER, FOR SOLUTION INTRAVENOUS at 18:09

## 2023-08-06 RX ADMIN — ENOXAPARIN SODIUM 80 MG: 80 INJECTION SUBCUTANEOUS at 21:11

## 2023-08-06 RX ADMIN — FENTANYL 1 PATCH: 25 PATCH TRANSDERMAL at 21:06

## 2023-08-06 RX ADMIN — SUCRALFATE 1 G: 1 SUSPENSION ORAL at 23:12

## 2023-08-06 RX ADMIN — MAGNESIUM SULFATE HEPTAHYDRATE: 500 INJECTION, SOLUTION INTRAMUSCULAR; INTRAVENOUS at 21:04

## 2023-08-06 RX ADMIN — OXYCODONE HYDROCHLORIDE 10 MG: 5 SOLUTION ORAL at 13:10

## 2023-08-06 RX ADMIN — CARVEDILOL 6.25 MG: 6.25 TABLET, FILM COATED ORAL at 18:33

## 2023-08-06 RX ADMIN — OXYCODONE HYDROCHLORIDE 10 MG: 5 SOLUTION ORAL at 21:18

## 2023-08-06 RX ADMIN — LORAZEPAM 1 MG: 0.5 TABLET ORAL at 23:12

## 2023-08-06 RX ADMIN — SODIUM CHLORIDE 1000 ML: 9 INJECTION, SOLUTION INTRAVENOUS at 18:11

## 2023-08-06 ASSESSMENT — ACTIVITIES OF DAILY LIVING (ADL)
WEAR_GLASSES_OR_BLIND: YES
ADLS_ACUITY_SCORE: 37
ADLS_ACUITY_SCORE: 37
CONCENTRATING,_REMEMBERING_OR_MAKING_DECISIONS_DIFFICULTY: YES
DIFFICULTY_EATING/SWALLOWING: YES
CHANGE_IN_FUNCTIONAL_STATUS_SINCE_ONSET_OF_CURRENT_ILLNESS/INJURY: NO
EATING: 0-->INDEPENDENT
SWALLOWING: 0-->SWALLOWS FOODS/LIQUIDS WITHOUT DIFFICULTY
ADLS_ACUITY_SCORE: 37
ADLS_ACUITY_SCORE: 37
ADLS_ACUITY_SCORE: 25
ADLS_ACUITY_SCORE: 25
FALL_HISTORY_WITHIN_LAST_SIX_MONTHS: NO
DRESSING/BATHING_DIFFICULTY: NO
EATING/SWALLOWING: OTHER (SEE COMMENTS)
WALKING_OR_CLIMBING_STAIRS_DIFFICULTY: NO
EQUIPMENT_CURRENTLY_USED_AT_HOME: CANE, STRAIGHT;SHOWER CHAIR
TOILETING_ISSUES: NO
SWALLOWING: 0-->SWALLOWS FOODS/LIQUIDS WITHOUT DIFFICULTY (DEVELOPMENTALLY APPROPRIATE)
EATING: 0-->INDEPENDENT
DOING_ERRANDS_INDEPENDENTLY_DIFFICULTY: NO
ADLS_ACUITY_SCORE: 37
ADLS_ACUITY_SCORE: 35
ADLS_ACUITY_SCORE: 25

## 2023-08-06 NOTE — PROGRESS NOTES
Transfer Type: [facility]  Transfer Triage Note    Originating unit:[facility] ED    Final intended location for transfer: [facility] surg or IMC, or ICU    Tele required:  No    Time of admission request: 0840    Anticipated to be boarding in ED for >4 hours? No    Patient added to Interhospital transfer list?  Yes    Brief case description: 52yoM w/ azevedo for TPN admitted for Klebsiella bacteremia. On Cefepime. Transfering to [facility] on [facility].     AYO CHAVEZ MD     Addendum: Going to  awaiting transport. Continuing cefepime and placed nutrition consult for TPN while waiting.

## 2023-08-06 NOTE — PHARMACY-ADMISSION MEDICATION HISTORY
Pharmacist Admission Medication History    Admission medication history is complete. The information provided in this note is only as accurate as the sources available at the time of the update.    Medication reconciliation/reorder completed by provider prior to medication history? Yes    Information Source(s): Patient and CareEverywhere/SureScripts via in-person    Pertinent Information:   Receives TPN from [facility]. See separate note for current formulas.  Vitamin D taken every Sunday.  Cyanocobalamin given every month; last dose 7/5/2023.   Currently wearing a fentanyl patch; due to change patches today, 8/6/2023; uses every 48hours.     Changes made to PTA medication list:  Added: None  Deleted: pantoprazole-last filled 4/2023; famotidine in TPN  Changed:   1. Changed lidocaine patches from every 24hours to every 24hours as needed. Typically uses for back pain when needed.  2. Changed miralax from daily to daily as needed. Daily use causes diarrhea.  3. Changed lorazepam directions from daily as needed for anxiety to every night at bedtime.          Allergies reviewed with patient and updates made in EHR: yes    Medication History Completed By: Carly Lujan RPH 8/6/2023 6:34 PM    Prior to Admission medications    Medication Sig Last Dose Taking? Auth Provider Long Term End Date   albuterol (VENTOLIN HFA) 108 (90 Base) MCG/ACT inhaler Inhale 2 puffs into the lungs every 6 hours as needed More than a month Yes Chuy Isaac MD Yes    amphetamine-dextroamphetamine (ADDERALL) 20 MG tablet TAKE ONE TABLET BY MOUTH ONCE DAILY 8/6/2023 Yes Chuy Isaac MD No    azithromycin (ZITHROMAX) 200 MG/5ML suspension Take 6.25 mLs (250 mg) by mouth daily for 4 days  Patient taking differently: Take 250 mg by mouth daily Prescribed 4 day course on 8/4/2023 8/6/2023 Yes Ab Loera, DO  8/9/23   carvedilol (COREG) 6.25 MG tablet TAKE 2 TABLETS (12.5 MG) BY MOUTH 2 TIMES DAILY WITH MEALS 8/6/2023  Yes Chuy Isaac MD Yes    cefdinir (OMNICEF) 250 MG/5ML suspension Take 12 mLs (600 mg) by mouth daily for 10 days  Patient taking differently: Take 600 mg by mouth daily Prescribed 10 day course on 8/4/2023 8/6/2023 Yes Ab Loera,   8/15/23   enoxaparin ANTICOAGULANT (LOVENOX) 80 MG/0.8ML syringe INJECT THE CONTENTS OF ONE SYRINGE (80MG) UNDER THE SKIN TWO TIMES A DAY 8/6/2023 Yes Chuy Isaac MD     lidocaine (LIDODERM) 5 % patch Place 1-2 patches onto the skin every 24 hours Wear for 12 hours, remove for 12 hours.  OK to cut to better fit to size.  Patient taking differently: Place 1-2 patches onto the skin daily as needed for moderate pain Wear for 12 hours, remove for 12 hours.  OK to cut to better fit to size. Past Week Yes Natalie Hull APRN CNP     LORazepam (ATIVAN) 1 MG tablet TAKE ONE TABLET BY MOUTH ONCE DAILY AS NEEDED FOR ANXIETY WITH TPN AND MEDICATIONS  Patient taking differently: Take 1 mg by mouth At Bedtime 8/5/2023 Yes Chuy Isaac MD     nystatin (MYCOSTATIN) 461563 UNIT/GM external cream APPLY TOPICALLY 2 TIMES DAILY 8/6/2023 Yes Carlos Doherty,      ondansetron (ZOFRAN ODT) 8 MG ODT tab DISSOLVE ONE TABLET ON TONGUE EVERY 8 HOURS AS NEEDED FOR NAUSEA Past Week Yes Chuy Isaac MD No    oxyCODONE (ROXICODONE) 5 MG/5ML solution Take 5-10 mLs (5-10 mg) by mouth every 4 hours as needed for moderate to severe pain Max of 40mg/day. Fill 07/30/23 and start 08/01/23. 30 day supply for chronic pain. 8/6/2023 Yes Teressa Perdomo MD     polyethylene glycol (MIRALAX) 17 GM/Dose powder Take 17 g by mouth daily  Patient taking differently: Take 17 g by mouth daily as needed for constipation Past Week Yes Natalie Hull APRN CNP No    sucralfate (CARAFATE) 1 GM/10ML suspension TAKE 10MLS  BY MOUTH FOUR TIMES A DAY AS NEEDED 8/6/2023 Yes Chuy Isaac MD     cyanocobalamin (CYANOCOBALAMIN) 1000 MCG/ML injection INJECT 1 ML INTO THE MUSCLE EVERY 30  "DAYS 7/5/2023  Chuy Isaac MD Yes    [facility] HOME INFUSION MANAGED PATIENT Contact [facility] for patient specific medication information at [phone] on admission and discharge from the hospital.  Phones are answered 24 hours a day 7 days a week 365 days a year.    Providers - Choose \"CONTINUE HOME MED (no script)\" at discharge if patient treatment with home infusion will continue.   Ilsa Shine PA     fentaNYL (DURAGESIC) 25 mcg/hr 72 hr patch Place 1 patch onto the skin every 48 hours Fill 07/30/23 and start 08/01/23. 30 day supply for chronic pain. 8/4/2023  Teressa Perdomo MD     naloxone (NARCAN) 4 MG/0.1ML nasal spray Spray 1 spray (4 mg) into one nostril alternating nostrils once as needed for opioid reversal every 2-3 minutes until assistance arrives   Natalie Hull, SAILAJA CNP Yes    Syringe/Needle, Disp, (B-D ECLIPSE SYRINGE) 27G X 1/2\" 1 ML MISC 1 Device every 30 days   Chuy Isaac MD     vitamin D2 (ERGOCALCIFEROL) 07461 units (1250 mcg) capsule Take 1 capsule (50,000 Units) by mouth once a week 7/30/2023  Chuy Isaac MD     NO ACTIVE MEDICATIONS .   Rupesh Rothman MD Yes 10/10/08      "

## 2023-08-06 NOTE — H&P
[facility]    History and Physical - [facility]       Date of Admission:  8/6/2023    Assessment & Plan      Parker Acevedo is a 52 year old male with history of short gut syndrome and severe malnutrition on chronic TPN with recent Cm placement, dysphagia, anxiety, recurrent CLABSI, recent admission 7/4-7/11 for MRSE bacteremia (s/p 4 weeks daptomycin, stopped on 8/2), LUE non-purulent cellulitis and catheter-associated septic subclavian DVT (on Lovenox) who presented to the ED on 8/4, diagnosed with L basilar pneumonia, sent with azithromycin, then called back for 8/4 blood cultures positive for Klebsiella pneumoniae bacteremia. Admitted for IV antibiotics and ID assessment.    #Klebsiella pneumoniae bacteremia  #Hx recurrent CLABSI  #Chronic TPN  #Recent Cm placement  #Left basilar pneumonia  Procal elevated to 5.26. Lactic of 0.6, no leukocytosis. Vitally and hemodynamically stable, has been afebrile and patient states he does not feel like he did when he had MRSE bacteremia in July. Likely true bacteremia given he has previously had Klebsiella bacteremia in June 2023 and possible sources being Cm line vs left basilar pneumonia (has risk of hospital-acquired PNA given recent hospitalizations) vs less likely contamination from colonization of his Cm, which was last replaced on 7/10. 8/4 blood culture sensitivities show Klebsiella is resistant to ampicillin, but is otherwise pan-susceptible.  Diagnosed with PNA in outside ED on 8/4, prescribed azithromycin and cefdinir. S/p only one dose of each. Initially had Tmax 101.7 on 8/3, fatigue, night sweats, but now is asymptomatic with normal respiratory exam and oxygen saturation in mid 90s on room air. Repeat CXR on 8/6 shows improved left basilar opacity compared to 8/4.   - IV Cefepime 2g q8h  - Continue PO azithromycin to cover for atypicals  - Discontinued PTA cefdinir  -  "ID consult  - Daily CMP, CBC, CRP  - Follow up repeat blood cultures from 8/6  - PTA Albuterol prn    #Short gut syndrome  #TPN dependent  #Severe malnutrition  #J tube  #Reflux  Has been on chronic TPN for 8-9 years due to severe malnutrition and short gut syndrome, which resulted from a Celestino-en-Y complication. Has J tube for venting purposes. Does take all medications orally and has PO intake, though he frequently has nausea/vomiting and dumping.   - Pharmacy/nutrition consult to manage TPN  - Continue TPN  - Regular diet  - PTA Carafate 1 g QID  - Bowel regimen: Miralax, Senna daily prn  - Nausea/vomiting: Zofran and compazine prn  - PTA Nystatin BID (area around J tube)    #Chronic pain  Mostly abdominal pain, secondary to above problem.  - PTA Oxycodone 5-10mg q4h prn, fentanyl patch q3d, lidocaine patch, tylenol prn    #Catheter-associated septic subclavian DVT  DVT noted during previous 7/4 - 7/11 hospitalization, has been on Lovenox since.   - Continue Lovenox 80mg BID    #Anemia  Baseline over past couple years appears to be around 11-12, likely in setting of chronic disease. Hgb on admission at 10.6.  - Daily CBC  - Transfuse if Hgb <7    #Anxiety: PTA lorazepam at bedtime  #ADHD: PTA Adderall  #Paroxysmal AFib: PTA carvedilol       Diet:  TPN, Regular diet  DVT Prophylaxis: Enoxaparin (Lovenox) SQ  Sanchez Catheter: Not present  Fluids: s/p 1L NS in ED. TPN, PO  Lines: PRESENT             Cardiac Monitoring: None  Code Status:  Full Code    Clinically Significant Risk Factors Present on Admission               # Drug Induced Coagulation Defect: home medication list includes an anticoagulant medication         # Overweight: Estimated body mass index is 25.24 kg/m  as calculated from the following:    Height as of this encounter: 1.803 m (5' 11\").    Weight as of this encounter: 82.1 kg (181 lb).            Disposition Plan      Expected Discharge Date: 08/08/2023                The patient's care was " discussed with the Attending Physician, Dr. Alvarado .      Gil Rios MD  [facility]  [facility]  Securely message with [employer] (more info)  Text page via [employer] Paging/Directory   See signed in provider for up to date coverage information  ______________________________________________________________________    Chief Complaint   Bacteremia: 8/4 blood culture positive for Klebsiella    History is obtained from the patient and chart    History of Present Illness   Parker Acevedo is a 52 year old male with history of short gut syndrome and severe malnutrition on chronic TPN with recent Cm placement, dysphagia, anxiety, recurrent CLABSI, recent admission 7/4-7/11 for MRSE bacteremia (s/p 4 weeks daptomycin, stopped on 8/2), LUE non-purulent cellulitis and catheter-associated septic subclavian DVT (on Lovenox) who presented to the ED on 8/4, diagnosed with L basilar pneumonia, sent with azithromycin and cefdinir, then called back for 8/4 blood cultures positive for Klebsiella pneumoniae bacteremia.     On Thursday night, started feeling light-headed, night sweats, weakness/fatigue, some coughing, heaviness in chest. Tmax 101.7. Went to ED on 8/4 early AM, found to have elevated procal to 2.49 and CXR with mild left basilar infiltrate. Diagnosed with left basilar pneumonia. Started azithromycin and cefdinir - had one dose of each of those. He was called today regarding positive 8/4 blood cultures, 2/2 bottles with Klebsiella pneumoniae.     Has not had a fever since Thursday night, this time he feels he does not have a bacteremia. Feels different than previous times with bacteremia; he states last time he had headaches, lots of sneezing, many fluctuations in temperature. Currently, he is not experiencing any of this. No chest pain, no fever, chills, no skin changes or rash, no changes in stool (more often has diarrhea but can go 3-4 weeks  without even having BM).    Has n/v and abd pain at baseline, has not worsened. Has been on TPN for 8-9 years, still eats some PO but has n/v or dumping very frequently.     He was recently hospitalized at [facility] from 7/4 - 7/11 for MRSE bacteremia and he just finished a 4 week course of daptomycin on 8/2. He had a virtual follow up with ID on 7/27.       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    Complication of anesthesia     Difficulty swallowing     Gastric ulcer, unspecified as acute or chronic, without mention of hemorrhage, perforation, or obstruction     Gastro-oesophageal reflux disease     Head injury     Hiatal hernia     Other bladder disorder     Other chronic pain     PONV (postoperative nausea and vomiting)     Severe malnutrition (H)     TPN    Short gut syndrome     Tobacco abuse        Past Surgical History   Past Surgical History:   Procedure Laterality Date    AMPUTATION      APPENDECTOMY      BACK SURGERY  11/3/2014    curve in the spine    BIOPSY LYMPH NODE CERVICAL N/A 2/20/2015    Procedure: BIOPSY LYMPH NODE CERVICAL;  Surgeon: Baron Scanlon MD;  [facility]    CHOLECYSTECTOMY      COLONOSCOPY N/A 7/14/2021    Procedure: COLONOSCOPY;  Surgeon: Jimbo Estrada MD;  Location: [facility] OR    COLONOSCOPY N/A 4/13/2022    Procedure: COLONOSCOPY;  Surgeon: Jimbo Estrada MD;  Location: [facility] OR    DISCECTOMY, FUSION CERVICAL ANTERIOR ONE LEVEL, COMBINED N/A 2/15/2017    Procedure: COMBINED DISCECTOMY, FUSION CERVICAL ANTERIOR ONE LEVEL;  Surgeon: Darren Campos MD;  [facility]    ENDOSCOPIC INSERTION TUBE GASTROSTOMY  9/9/2013    Procedure: ENDOSCOPIC INSERTION TUBE GASTROSTOMY;;   Surgeon: Francis Vyas MD;  [facility]    ENDOSCOPIC ULTRASOUND UPPER GASTROINTESTINAL TRACT (GI)  4/29/2011    Procedure:ENDOSCOPIC ULTRASOUND UPPER GASTROINTESTINAL TRACT (GI); Both Procedures done Conjointly; Surgeon:NEREIDA HOUSER; [facility]    ENDOSCOPIC ULTRASOUND UPPER GASTROINTESTINAL TRACT (GI)  9/9/2013    Procedure: ENDOSCOPIC ULTRASOUND UPPER GASTROINTESTINAL TRACT (GI);  Endoscopic Ultrasound Guide Gastrostomy Tube Placement  C-arm;  Surgeon: Noe Lizarraga MD;  [facility]    ENDOSCOPIC ULTRASOUND UPPER GASTROINTESTINAL TRACT (GI) N/A 2/24/2021    Procedure: ENDOSCOPIC ULTRASOUND, ESOPHAGOSCOPY / UPPER GASTROINTESTINAL TRACT (GI), esophagastrogastroduodenoscopy;  Surgeon: Berny Bach MD;  [facility]    ENDOSCOPY  03/25/11    EGD, [facility]    ENDOSCOPY  08/04/09    Upper Endoscopy, [facility]    ENDOSCOPY  01/05/09    Upper Endoscopy, [facility]    ESOPHAGOSCOPY, GASTROSCOPY, DUODENOSCOPY (EGD), COMBINED  4/20/2011    Procedure:COMBINED ESOPHAGOSCOPY, GASTROSCOPY, DUODENOSCOPY (EGD); Surgeon:FRANCIS VYAS; [facility]    ESOPHAGOSCOPY, GASTROSCOPY, DUODENOSCOPY (EGD), COMBINED  6/15/2011    Procedure:COMBINED ESOPHAGOSCOPY, GASTROSCOPY, DUODENOSCOPY (EGD); Surgeon:FRANCIS VYAS; [facility]    ESOPHAGOSCOPY, GASTROSCOPY, DUODENOSCOPY (EGD), COMBINED  6/12/2013    Procedure: COMBINED ESOPHAGOSCOPY, GASTROSCOPY, DUODENOSCOPY (EGD);;  Surgeon: Francis Vyas MD;  [facility]    ESOPHAGOSCOPY, GASTROSCOPY, DUODENOSCOPY (EGD), COMBINED  11/22/2013    Procedure: COMBINED ESOPHAGOSCOPY, GASTROSCOPY, DUODENOSCOPY (EGD);;  Surgeon: Francis Vyas MD;  [facility]    ESOPHAGOSCOPY, GASTROSCOPY, DUODENOSCOPY (EGD), COMBINED  4/30/2014    Procedure: COMBINED ESOPHAGOSCOPY, GASTROSCOPY, DUODENOSCOPY (EGD);  Surgeon: Francis Vyas MD;  [facility]    ESOPHAGOSCOPY, GASTROSCOPY, DUODENOSCOPY  (EGD), COMBINED N/A 2/20/2015    Procedure: COMBINED ESOPHAGOSCOPY, GASTROSCOPY, DUODENOSCOPY (EGD), BIOPSY SINGLE OR MULTIPLE;  Surgeon: Baron Scanlon MD;  [facility]    ESOPHAGOSCOPY, GASTROSCOPY, DUODENOSCOPY (EGD), COMBINED N/A 9/30/2015    Procedure: COMBINED ESOPHAGOSCOPY, GASTROSCOPY, DUODENOSCOPY (EGD);  Surgeon: Francis Vyas MD;  Location:  GI    ESOPHAGOSCOPY, GASTROSCOPY, DUODENOSCOPY (EGD), COMBINED N/A 10/3/2019    Procedure: Upper Endoscopy;  Surgeon: Clif Morrow MD;  [facility]    GASTRECTOMY  6/22/2012    Procedure: GASTRECTOMY;  Open Approach, Excise Ulcers,Partial Gastrectomy, Esophagojejunostomy, Hiatal Hernia Repair, Extensive Lysis of Adhesions and Esaphagogastrodudenoscopy.;  Surgeon: Francis Vyas MD;  [facility]    GASTROJEJUNOSTOMY  08/26/09    Extensice enterolysis, partial resect. jejunum, part. resect gastric pouch, gastrojejunostomy anastomosis    HC ESOPH/GAS REFLUX TEST W NASAL IMPED ELECTRODE  8/5/2013    Procedure: ESOPHAGEAL IMPEDENCE FUNCTION TEST 1 HOUR OR LESS;  Surgeon: Halie Lang MD;  Location:  GI    HEAD & NECK SURGERY  2/15/2017    C5-C6    HERNIA REPAIR  2006    Umbilical hernia    HERNIORRHAPHY HIATAL  6/22/2012    Procedure: HERNIORRHAPHY HIATAL;;  Surgeon: Francis Vyas MD;  [facility]    HERNIORRHAPHY INGUINAL  11/22/2013    Procedure: HERNIORRHAPHY INGUINAL;;  Surgeon: Francis Vyas MD;  [facility]    INSERT PICC LINE Right 12/19/2019    Procedure: Picc Placement;  Surgeon: Per Dumont PA-C;  [facility]    INSERT PICC LINE Right 2/21/2020    Procedure: INSERTION, PICC;  Surgeon: Per Dumont PA-C;  [facility]    INSERT PORT VASCULAR ACCESS Right 12/19/2017    Procedure: INSERT PORT VASCULAR ACCESS;  Right Chest Port Placement ;  Surgeon: Javon, Lisandro Benton, PA-C;  [facility]    INSERT PORT VASCULAR ACCESS Right 8/2/2018    Procedure: INSERT PORT  VASCULAR ACCESS;  Place single lumen tunneled central venous access catheter;  Surgeon: Guy Jamil PA-C;  [facility]    IR CVC TUNNEL PLACEMENT > 5 YRS OF AGE  8/7/2019    IR CVC TUNNEL PLACEMENT > 5 YRS OF AGE  4/14/2020    IR CVC TUNNEL PLACEMENT > 5 YRS OF AGE  8/3/2020    IR CVC TUNNEL PLACEMENT > 5 YRS OF AGE  9/4/2020    IR CVC TUNNEL PLACEMENT > 5 YRS OF AGE  2/5/2021    IR CVC TUNNEL PLACEMENT > 5 YRS OF AGE  3/23/2021    IR CVC TUNNEL PLACEMENT > 5 YRS OF AGE  1/13/2022    IR CVC TUNNEL PLACEMENT > 5 YRS OF AGE  5/12/2022    IR CVC TUNNEL PLACEMENT > 5 YRS OF AGE  7/13/2022    IR CVC TUNNEL PLACEMENT > 5 YRS OF AGE  7/10/2023    IR CVC TUNNEL REMOVAL LEFT  6/7/2023    IR CVC TUNNEL REMOVAL RIGHT  10/1/2019    IR CVC TUNNEL REMOVAL RIGHT  7/30/2020    IR CVC TUNNEL REMOVAL RIGHT  9/2/2020    IR CVC TUNNEL REMOVAL RIGHT  2/3/2021    IR CVC TUNNEL REMOVAL RIGHT  3/19/2021    IR CVC TUNNEL REMOVAL RIGHT  1/10/2022    IR CVC TUNNEL REMOVAL RIGHT  5/9/2022    IR CVC TUNNEL REMOVAL RIGHT  7/8/2022    IR CVC TUNNEL REVISION LEFT  8/10/2022    IR CVC TUNNEL REVISION RIGHT  5/7/2021    IR FOLLOW UP VISIT OUTPATIENT  8/7/2019    IR PICC EXCHANGE LEFT  6/8/2023    IR PICC PLACEMENT > 5 YRS OF AGE  3/7/2019    IR PICC PLACEMENT > 5 YRS OF AGE  12/19/2019    IR PICC PLACEMENT > 5 YRS OF AGE  2/21/2020    IR PICC PLACEMENT > 5 YRS OF AGE  6/7/2023    LAPAROTOMY EXPLORATORY  11/22/2013    Procedure: LAPAROTOMY EXPLORATORY;  Exploratory Laparotomy, Upper Endoscopy, Left Inguinal Hernia Repair;  Surgeon: Francis Vyas MD;  [facility]    ORTHOPEDIC SURGERY      PICC INSERTION Right 03/16/2017    5fr DL BioFlo PICC, 42cm (3cm external) in the R medial brachial vein w/ tip in the SVC RA junction.    PICC INSERTION Left 09/23/2017    5fr DL BioFlo PICC, 45cm (1cm external) in the L basilic vein w/ tip in the SVC RA junction.    PICC INSERTION Right 05/16/2019    5Fr - 43cm, Medial brachial vein,  "low SVC    PICC INSERTION Right 10/02/2019    5Fr - 43cm (2cm external), basilic vein, low SVC    SHAYLEE EN Y BOWEL  2003    SOFT TISSUE SURGERY      THORACIC SURGERY      TONSILLECTOMY      TRANSESOPHAGEAL ECHOCARDIOGRAM INTRAOPERATIVE N/A 2019    Procedure: TRANSESOPHAGEAL ECHOCARDIOGRAM INTRAOPERATIVE;  Surgeon: GENERIC ANESTHESIA PROVIDER;  [facility]    TRANSESOPHAGEAL ECHOCARDIOGRAM INTRAOPERATIVE N/A 2023    Procedure: Transesophageal echocardiogram in the OR;  Surgeon: GENERIC ANESTHESIA PROVIDER;  [facility]    ZZC GASTRIC BYPASS,OBESE<100CM SHAYLEE-EN-Y      lost 300 pounds       Prior to Admission Medications   Prior to Admission Medications   Prescriptions Last Dose Informant Patient Reported? Taking?   [facility] MANAGED PATIENT  Spouse/Significant Other No No   Sig: Contact [facility] for patient specific medication information at [phone] on admission and discharge from the hospital.  Phones are answered 24 hours a day 7 days a week 365 days a year.    Providers - Choose \"CONTINUE HOME MED (no script)\" at discharge if patient treatment with home infusion will continue.   LORazepam (ATIVAN) 1 MG tablet   No No   Sig: TAKE ONE TABLET BY MOUTH ONCE DAILY AS NEEDED FOR ANXIETY WITH TPN AND MEDICATIONS   Syringe/Needle, Disp, (B-D ECLIPSE SYRINGE) 27G X 1/2\" 1 ML MISC  Spouse/Significant Other No No   Si Device every 30 days   albuterol (VENTOLIN HFA) 108 (90 Base) MCG/ACT inhaler  Spouse/Significant Other No No   Sig: Inhale 2 puffs into the lungs every 6 hours as needed   amphetamine-dextroamphetamine (ADDERALL) 20 MG tablet   No No   Sig: TAKE ONE TABLET BY MOUTH ONCE DAILY   azithromycin (ZITHROMAX) 200 MG/5ML suspension   No No   Sig: Take 6.25 mLs (250 mg) by mouth daily for 4 days   carvedilol (COREG) 6.25 MG tablet   No No   Sig: TAKE 2 TABLETS (12.5 MG) BY MOUTH 2 TIMES DAILY WITH MEALS   cefdinir (OMNICEF) 250 MG/5ML suspension   No No   Sig: " Take 12 mLs (600 mg) by mouth daily for 10 days   cyanocobalamin (CYANOCOBALAMIN) 1000 MCG/ML injection  Spouse/Significant Other No No   Sig: INJECT 1 ML INTO THE MUSCLE EVERY 30 DAYS   enoxaparin ANTICOAGULANT (LOVENOX) 80 MG/0.8ML syringe   No No   Sig: INJECT THE CONTENTS OF ONE SYRINGE (80MG) UNDER THE SKIN TWO TIMES A DAY   fentaNYL (DURAGESIC) 25 mcg/hr 72 hr patch   No No   Sig: Place 1 patch onto the skin every 48 hours Fill 07/30/23 and start 08/01/23. 30 day supply for chronic pain.   lidocaine (LIDODERM) 5 % patch  Spouse/Significant Other No No   Sig: Place 1-2 patches onto the skin every 24 hours Wear for 12 hours, remove for 12 hours.  OK to cut to better fit to size.   naloxone (NARCAN) 4 MG/0.1ML nasal spray  Spouse/Significant Other No No   Sig: Spray 1 spray (4 mg) into one nostril alternating nostrils once as needed for opioid reversal every 2-3 minutes until assistance arrives   nystatin (MYCOSTATIN) 828544 UNIT/GM external cream  Spouse/Significant Other No No   Sig: APPLY TOPICALLY 2 TIMES DAILY   ondansetron (ZOFRAN ODT) 8 MG ODT tab   No No   Sig: DISSOLVE ONE TABLET ON TONGUE EVERY 8 HOURS AS NEEDED FOR NAUSEA   oxyCODONE (ROXICODONE) 5 MG/5ML solution   No No   Sig: Take 5-10 mLs (5-10 mg) by mouth every 4 hours as needed for moderate to severe pain Max of 40mg/day. Fill 07/30/23 and start 08/01/23. 30 day supply for chronic pain.   pantoprazole (PROTONIX) 40 MG EC tablet  Spouse/Significant Other No No   Sig: Take 1 tablet (40 mg) by mouth daily   polyethylene glycol (MIRALAX) 17 GM/Dose powder  Spouse/Significant Other No No   Sig: Take 17 g by mouth daily   sucralfate (CARAFATE) 1 GM/10ML suspension   No No   Sig: TAKE 10MLS  BY MOUTH FOUR TIMES A DAY AS NEEDED   vitamin D2 (ERGOCALCIFEROL) 37752 units (1250 mcg) capsule  Spouse/Significant Other No No   Sig: Take 1 capsule (50,000 Units) by mouth once a week      Facility-Administered Medications: None        Social History   I have  reviewed this patient's social history and updated it with pertinent information if needed.  Social History     Tobacco Use    Smoking status: Light Smoker     Packs/day: 0.50     Years: 3.00     Pack years: 1.50     Types: Cigarettes    Smokeless tobacco: Never    Tobacco comments:     2/4/2021    smokes 3 cigarettes/day   Vaping Use    Vaping Use: Never used   Substance Use Topics    Alcohol use: No     Comment: quit 2002    Drug use: No   Smoked for 30 years total, but quit for several years    Family History   I have reviewed this patient's family history and updated it with pertinent information if needed.  Family History   Problem Relation Age of Onset    Gastrointestinal Disease Mother         Crohns disease    Anxiety Disorder Mother     Thyroid Disease Mother         Grave's disease    Cancer Father         ear cancer-skin cancer/melanoma    Breast Cancer Maternal Grandmother     Macular Degeneration Maternal Grandfather     Anxiety Disorder Sister     Diabetes Maternal Uncle     Breast Cancer Other     Hypertension No family hx of     Hyperlipidemia No family hx of     Cerebrovascular Disease No family hx of     Prostate Cancer No family hx of     Depression No family hx of     Anesthesia Reaction No family hx of     Asthma No family hx of     Osteoporosis No family hx of     Genetic Disorder No family hx of     Obesity No family hx of     Mental Illness No family hx of     Substance Abuse No family hx of     Glaucoma No family hx of        Allergies   Allergies   Allergen Reactions    Bactrim [Sulfamethoxazole-Trimethoprim] Rash    Penicillins Anaphylaxis     Please see Antimicrobial Management Team allergy assessment note 10/10/2018. Patient reported tolerating amoxicillin.  Tolerating cefepime and ceftriaxone without reaction 6/23    Ertapenem Nausea and Vomiting    Doxycycline Rash    Vancomycin Rash     Rash after receiving vancomycin 3/28/16 (infusion reaction?). Tolerated with slower infusion and  diphenhydramine premed.  Tolerated 1250mg over 90minutes 7/2023.        Physical Exam   Vital Signs: Temp: 98  F (36.7  C) Temp src: Oral BP: 138/88 Pulse: 73   Resp: 18 SpO2: 100 % O2 Device: None (Room air)    Weight: 181 lbs 0 oz    Constitutional: awake, alert, cooperative, no apparent distress, very energetic and well appearing  Eyes: Lids and lashes normal, pupils equal and round, sclera clear, conjunctiva normal  ENT: Normocephalic, without obvious abnormality, atraumatic, oral pharynx with moist mucous membranes, tonsils without erythema or exudates, gums normal, teeth missing.  Respiratory: No increased work of breathing, good air exchange, clear to auscultation bilaterally, no crackles or wheezing  Cardiovascular: Regular rate and rhythm, normal S1 and S2, and no murmur noted  GI: Soft, non-distended, non-tender, J tube with baseline surrounding pink skin but otherwise no erythema or discharge around J tube site. No masses palpated  Skin: no bruising or bleeding, normal skin color, texture, turgor, no redness, warmth, or swelling, and no rashes  Musculoskeletal: no lower extremity pitting edema present  there is no redness, warmth, or swelling of the joints  Neurologic: Awake, alert, oriented to name, place and time.   Neuropsychiatric: General: normal, calm, and normal eye contact  Level of consciousness: alert / normal  Affect: normal and pleasant  Orientation: oriented to self, place, time and situation  Memory and insight: normal, memory for past and recent events intact, and thought process normal      Data     I have personally reviewed the following data over the past 24 hrs:    6.8  \   10.6 (L)   / 160     141 106 9.2 /  100 (H)   3.5 25 0.71 \     ALT: 89 (H) AST: 24 AP: 125 TBILI: 0.2   ALB: 3.7 TOT PROTEIN: 6.7 LIPASE: N/A     Procal: 5.26 (HH) CRP: N/A Lactic Acid: 0.6 (L)         8/4 blood cultures: 2/2 bottles positive for Klebsiella pneumoniae    Imaging results reviewed over the past 24  hrs:   Recent Results (from the past 24 hour(s))   XR Chest 2 Views    Narrative    Examination: XR CHEST 2 VIEWS 8/6/2023 8:27 AM    Indication: pneumonia bacteremia    Comparison: X-ray 8/4/2023    Findings:  PA and lateral views of the chest. Trachea is midline. Cardiac  silhouette and pulmonary vasculature are within normal limits. No  significant pleural effusion or discernible pneumothorax. Left  dual-lumen IVC terminates over the mid SVC. Improved left basilar  opacities. Unremarkable visualized upper abdomen. No acute osseous  abnormality.      Impression    Impression:   Improved left basilar opacity. No new opacities identified.    I have personally reviewed the examination and initial interpretation  and I agree with the findings.    TIANNA FARMER MD         SYSTEM ID:  [id number]

## 2023-08-06 NOTE — PROGRESS NOTES
Pt left unit without talking to staff roughly around 1610.    Patient will be educated on potential risks of choosing to leave the unit and that the responsibility for patient well-being will belong to the patient.  Writer will provide education on the risks of leaving the unit, and to notify staff.    Patient notified staff prior to leaving unit: No  Coban wrap placed over IV prior to pt leaving unit: Unsure, New admission to the floor.    Provider notified and aware.    Pt arrived back on unit between [id number]

## 2023-08-06 NOTE — PHARMACY-PHARMACOTHERAPY NOTE
Pharmacy/Nutrition consulted to start TPN. Patient received TPN from [facility]     Spoke with [facility] who provided formulas.  Ayqpvu-Tvoxjsnad-Nsxgft formula      Kiejlt-Bptnbmi-Yudbopmt-Saturday formula:

## 2023-08-06 NOTE — PROGRESS NOTES
Vital signs:  Temp: 98  F (36.7  C) Temp src: Oral BP: 138/88 Pulse: 73   Resp: 18 SpO2: 100 % O2 Device: None (Room air)       Updated patient that he would be transferred to [place]  and report given to Taylor HARRISON.  Patient wanted to drive himself or get out to smoke a cigarette.  Informed patient of the hospital policy and asked him to please wait.  EMS transport scheduled and paperwork and vital signs done.

## 2023-08-06 NOTE — ED TRIAGE NOTES
Triage Assessment       Row Name 08/06/23 0448       Triage Assessment (Adult)    Airway WDL WDL       Respiratory WDL    Respiratory WDL WDL       Skin Circulation/Temperature WDL    Skin Circulation/Temperature WDL WDL       Cardiac WDL    Cardiac WDL WDL       Peripheral/Neurovascular WDL    Peripheral Neurovascular WDL WDL       Cognitive/Neuro/Behavioral WDL    Cognitive/Neuro/Behavioral WDL WDL       [place] Coma Scale    Best Eye Response 4-->(E4) spontaneous    Best Motor Response 6-->(M6) obeys commands    Best Verbal Response 5-->(V5) oriented    [place] Coma Scale Score 15

## 2023-08-06 NOTE — ED TRIAGE NOTES
Triage Assessment       Row Name 08/06/23 0448       Triage Assessment (Adult)    Airway WDL WDL       Respiratory WDL    Respiratory WDL WDL       Skin Circulation/Temperature WDL    Skin Circulation/Temperature WDL WDL       Cardiac WDL    Cardiac WDL WDL       Peripheral/Neurovascular WDL    Peripheral Neurovascular WDL WDL       Cognitive/Neuro/Behavioral WDL    Cognitive/Neuro/Behavioral WDL WDL       [place] Coma Scale    Best Eye Response 4-->(E4) spontaneous    Best Motor Response 6-->(M6) obeys commands    Best Verbal Response 5-->(V5) oriented    [place] Coma Scale Score 15

## 2023-08-06 NOTE — ED PROVIDER NOTES
ED Provider Note  [facility]      History     Chief Complaint   Patient presents with    Blood Culture Positive     Called in by Cresencio for positive blood culture that was done Thursday.      HPI  Parker Acevedo is a 52 year old male who has history of frequent infections recently treated for multiple infections with Klebsiella along with Staph epidermidis DONALDO has had PICC lines infected had a Cm also and recent Cm placed a month ago uses for TPN over the last 8 years.  Patient finished a course he states of azithromycin although reviewing records he was treated with vancomycin and ceftriaxone he has tolerated cefepime in the past before also.  Patient recent cough symptoms diagnosed with pneumonia positive blood culture noted gram-negative bacilli.  Patient struck to come to the ER for admission.  Patient here otherwise had some fevers etc. right now feels okay no significant shortness of breath.  No chest pain no dysuric symptoms.  No joint swellings etc. no chest pain.    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    Complication of anesthesia     Difficulty swallowing     Gastric ulcer, unspecified as acute or chronic, without mention of hemorrhage, perforation, or obstruction     Gastro-oesophageal reflux disease     Head injury     Hiatal hernia     Other bladder disorder     Other chronic pain     PONV (postoperative nausea and vomiting)     Severe malnutrition (H)     TPN    Short gut syndrome     Tobacco abuse      Past Surgical History:   Procedure Laterality Date    AMPUTATION      APPENDECTOMY      BACK SURGERY  11/3/2014    curve in the spine    BIOPSY LYMPH NODE CERVICAL N/A  2/20/2015    Procedure: BIOPSY LYMPH NODE CERVICAL;  Surgeon: Baron Scanlon MD;  [facility]: [facility]    CHOLECYSTECTOMY      COLONOSCOPY N/A 7/14/2021    Procedure: COLONOSCOPY;  Surgeon: Jimbo Estrada MD;  Location: [facility] OR    COLONOSCOPY N/A 4/13/2022    Procedure: COLONOSCOPY;  Surgeon: Jimbo Estrada MD;  Location: [facility] OR    DISCECTOMY, FUSION CERVICAL ANTERIOR ONE LEVEL, COMBINED N/A 2/15/2017    Procedure: COMBINED DISCECTOMY, FUSION CERVICAL ANTERIOR ONE LEVEL;  Surgeon: Darren Campos MD;  Location: [facility]    ENDOSCOPIC INSERTION TUBE GASTROSTOMY  9/9/2013    Procedure: ENDOSCOPIC INSERTION TUBE GASTROSTOMY;;  Surgeon: Francis Vyas MD;  [facility]    ENDOSCOPIC ULTRASOUND UPPER GASTROINTESTINAL TRACT (GI)  4/29/2011    Procedure:ENDOSCOPIC ULTRASOUND UPPER GASTROINTESTINAL TRACT (GI); Both Procedures done Conjointly; Surgeon:NEREIDA HOUSER; [facility]    ENDOSCOPIC ULTRASOUND UPPER GASTROINTESTINAL TRACT (GI)  9/9/2013    Procedure: ENDOSCOPIC ULTRASOUND UPPER GASTROINTESTINAL TRACT (GI);  Endoscopic Ultrasound Guide Gastrostomy Tube Placement  C-arm;  Surgeon: Noe Lizarraga MD;  [facility]    ENDOSCOPIC ULTRASOUND UPPER GASTROINTESTINAL TRACT (GI) N/A 2/24/2021    Procedure: ENDOSCOPIC ULTRASOUND, ESOPHAGOSCOPY / UPPER GASTROINTESTINAL TRACT (GI), esophagastrogastroduodenoscopy;  Surgeon: Berny Bach MD;  [facility]    ENDOSCOPY  03/25/11    EGD, [facility]    ENDOSCOPY  08/04/09    Upper Endoscopy, [facility]    ENDOSCOPY  01/05/09    Upper Endoscopy, [facility]    ESOPHAGOSCOPY, GASTROSCOPY, DUODENOSCOPY (EGD), COMBINED  4/20/2011    Procedure:COMBINED ESOPHAGOSCOPY, GASTROSCOPY, DUODENOSCOPY (EGD); Surgeon:FRANCIS VYAS; [facility]    ESOPHAGOSCOPY, GASTROSCOPY, DUODENOSCOPY (EGD), COMBINED  6/15/2011    Procedure:COMBINED ESOPHAGOSCOPY, GASTROSCOPY, DUODENOSCOPY (EGD); Surgeon:FRANCIS VYAS  TYREE; Location: GI    ESOPHAGOSCOPY, GASTROSCOPY, DUODENOSCOPY (EGD), COMBINED  6/12/2013    Procedure: COMBINED ESOPHAGOSCOPY, GASTROSCOPY, DUODENOSCOPY (EGD);;  Surgeon: Francis Vyas MD;  Location:  GI    ESOPHAGOSCOPY, GASTROSCOPY, DUODENOSCOPY (EGD), COMBINED  11/22/2013    Procedure: COMBINED ESOPHAGOSCOPY, GASTROSCOPY, DUODENOSCOPY (EGD);;  Surgeon: Francis Vyas MD;  [facility]    ESOPHAGOSCOPY, GASTROSCOPY, DUODENOSCOPY (EGD), COMBINED  4/30/2014    Procedure: COMBINED ESOPHAGOSCOPY, GASTROSCOPY, DUODENOSCOPY (EGD);  Surgeon: Francis Vyas MD;  Location:  GI    ESOPHAGOSCOPY, GASTROSCOPY, DUODENOSCOPY (EGD), COMBINED N/A 2/20/2015    Procedure: COMBINED ESOPHAGOSCOPY, GASTROSCOPY, DUODENOSCOPY (EGD), BIOPSY SINGLE OR MULTIPLE;  Surgeon: Baron Scanlon MD;  Location:  OR    ESOPHAGOSCOPY, GASTROSCOPY, DUODENOSCOPY (EGD), COMBINED N/A 9/30/2015    Procedure: COMBINED ESOPHAGOSCOPY, GASTROSCOPY, DUODENOSCOPY (EGD);  Surgeon: Francis Vyas MD;  Location:  GI    ESOPHAGOSCOPY, GASTROSCOPY, DUODENOSCOPY (EGD), COMBINED N/A 10/3/2019    Procedure: Upper Endoscopy;  Surgeon: Clif Morrow MD;  [facility]    GASTRECTOMY  6/22/2012    Procedure: GASTRECTOMY;  Open Approach, Excise Ulcers,Partial Gastrectomy, Esophagojejunostomy, Hiatal Hernia Repair, Extensive Lysis of Adhesions and Esaphagogastrodudenoscopy.;  Surgeon: Francis Vyas MD;  [facility]    GASTROJEJUNOSTOMY  08/26/09    Extensice enterolysis, partial resect. jejunum, part. resect gastric pouch, gastrojejunostomy anastomosis    HC ESOPH/GAS REFLUX TEST W NASAL IMPED ELECTRODE  8/5/2013    Procedure: ESOPHAGEAL IMPEDENCE FUNCTION TEST 1 HOUR OR LESS;  Surgeon: Halie Lang MD;  [facility]    HEAD & NECK SURGERY  2/15/2017    C5-C6    HERNIA REPAIR  2006    Umbilical hernia    HERNIORRHAPHY HIATAL  6/22/2012    Procedure: HERNIORRHAPHY HIATAL;;  Surgeon: Lilliam  Francis Castanon MD;  [facility]    HERNIORRHAPHY INGUINAL  11/22/2013    Procedure: HERNIORRHAPHY INGUINAL;;  Surgeon: Francis Vyas MD;  [facility]    INSERT PICC LINE Right 12/19/2019    Procedure: Picc Placement;  Surgeon: Per Dumont PA-C;  [facility]    INSERT PICC LINE Right 2/21/2020    Procedure: INSERTION, PICC;  Surgeon: Per Dumont PA-C;  [facility]    INSERT PORT VASCULAR ACCESS Right 12/19/2017    Procedure: INSERT PORT VASCULAR ACCESS;  Right Chest Port Placement ;  Surgeon: Lisandro Alejandro PA-C;  [facility]    INSERT PORT VASCULAR ACCESS Right 8/2/2018    Procedure: INSERT PORT VASCULAR ACCESS;  Place single lumen tunneled central venous access catheter;  Surgeon: Guy Jamil PA-C;  [facility]    IR CVC TUNNEL PLACEMENT > 5 YRS OF AGE  8/7/2019    IR CVC TUNNEL PLACEMENT > 5 YRS OF AGE  4/14/2020    IR CVC TUNNEL PLACEMENT > 5 YRS OF AGE  8/3/2020    IR CVC TUNNEL PLACEMENT > 5 YRS OF AGE  9/4/2020    IR CVC TUNNEL PLACEMENT > 5 YRS OF AGE  2/5/2021    IR CVC TUNNEL PLACEMENT > 5 YRS OF AGE  3/23/2021    IR CVC TUNNEL PLACEMENT > 5 YRS OF AGE  1/13/2022    IR CVC TUNNEL PLACEMENT > 5 YRS OF AGE  5/12/2022    IR CVC TUNNEL PLACEMENT > 5 YRS OF AGE  7/13/2022    IR CVC TUNNEL PLACEMENT > 5 YRS OF AGE  7/10/2023    IR CVC TUNNEL REMOVAL LEFT  6/7/2023    IR CVC TUNNEL REMOVAL RIGHT  10/1/2019    IR CVC TUNNEL REMOVAL RIGHT  7/30/2020    IR CVC TUNNEL REMOVAL RIGHT  9/2/2020    IR CVC TUNNEL REMOVAL RIGHT  2/3/2021    IR CVC TUNNEL REMOVAL RIGHT  3/19/2021    IR CVC TUNNEL REMOVAL RIGHT  1/10/2022    IR CVC TUNNEL REMOVAL RIGHT  5/9/2022    IR CVC TUNNEL REMOVAL RIGHT  7/8/2022    IR CVC TUNNEL REVISION LEFT  8/10/2022    IR CVC TUNNEL REVISION RIGHT  5/7/2021    IR FOLLOW UP VISIT OUTPATIENT  8/7/2019    IR PICC EXCHANGE LEFT  6/8/2023    IR PICC PLACEMENT > 5 YRS OF AGE  3/7/2019    IR PICC PLACEMENT > 5 YRS OF AGE  12/19/2019    IR  PICC PLACEMENT > 5 YRS OF AGE  2/21/2020    IR PICC PLACEMENT > 5 YRS OF AGE  6/7/2023    LAPAROTOMY EXPLORATORY  11/22/2013    Procedure: LAPAROTOMY EXPLORATORY;  Exploratory Laparotomy, Upper Endoscopy, Left Inguinal Hernia Repair;  Surgeon: Francis Vyas MD;  [facility]    ORTHOPEDIC SURGERY      PICC INSERTION Right 03/16/2017    5fr DL BioFlo PICC, 42cm (3cm external) in the R medial brachial vein w/ tip in the SVC RA junction.    PICC INSERTION Left 09/23/2017    5fr DL BioFlo PICC, 45cm (1cm external) in the L basilic vein w/ tip in the SVC RA junction.    PICC INSERTION Right 05/16/2019    5Fr - 43cm, Medial brachial vein, low SVC    PICC INSERTION Right 10/02/2019    5Fr - 43cm (2cm external), basilic vein, low SVC    SHAYLEE EN Y BOWEL  2003    SOFT TISSUE SURGERY      THORACIC SURGERY      TONSILLECTOMY      TRANSESOPHAGEAL ECHOCARDIOGRAM INTRAOPERATIVE N/A 1/8/2019    Procedure: TRANSESOPHAGEAL ECHOCARDIOGRAM INTRAOPERATIVE;  Surgeon: GENERIC ANESTHESIA PROVIDER;  [facility]    TRANSESOPHAGEAL ECHOCARDIOGRAM INTRAOPERATIVE N/A 7/7/2023    Procedure: Transesophageal echocardiogram in the OR;  Surgeon: GENERIC ANESTHESIA PROVIDER;  [facility]    ZZC GASTRIC BYPASS,OBESE<100CM SHAYLEE-EN-Y  2002    lost 300 pounds     No current outpatient medications on file.    Allergies   Allergen Reactions    Bactrim [Sulfamethoxazole-Trimethoprim] Rash    Penicillins Anaphylaxis     Please see Antimicrobial Management Team allergy assessment note 10/10/2018. Patient reported tolerating amoxicillin.  Tolerating cefepime and ceftriaxone without reaction 6/23    Ertapenem Nausea and Vomiting    Doxycycline Rash    Vancomycin Rash     Rash after receiving vancomycin 3/28/16 (infusion reaction?). Tolerated with slower infusion and diphenhydramine premed.  Tolerated 1250mg over 90minutes 7/2023.     Family History  Family History   Problem Relation Age of Onset    Gastrointestinal Disease Mother          "Crohns disease    Anxiety Disorder Mother     Thyroid Disease Mother         Grave's disease    Cancer Father         ear cancer-skin cancer/melanoma    Breast Cancer Maternal Grandmother     Macular Degeneration Maternal Grandfather     Anxiety Disorder Sister     Diabetes Maternal Uncle     Breast Cancer Other     Hypertension No family hx of     Hyperlipidemia No family hx of     Cerebrovascular Disease No family hx of     Prostate Cancer No family hx of     Depression No family hx of     Anesthesia Reaction No family hx of     Asthma No family hx of     Osteoporosis No family hx of     Genetic Disorder No family hx of     Obesity No family hx of     Mental Illness No family hx of     Substance Abuse No family hx of     Glaucoma No family hx of      Social History   Social History     Tobacco Use    Smoking status: Light Smoker     Packs/day: 0.50     Years: 3.00     Pack years: 1.50     Types: Cigarettes    Smokeless tobacco: Never    Tobacco comments:     2/4/2021    smokes 3 cigarettes/day   Vaping Use    Vaping Use: Never used   Substance Use Topics    Alcohol use: No     Comment: quit 2002    Drug use: No         A medically appropriate review of systems was performed with pertinent positives and negatives noted in the HPI, and all other systems negative.    Physical Exam   BP: 138/88  Pulse: 73  Temp: 97.9  F (36.6  C)  Resp: 16  Height: 180.3 cm (5' 11\")  Weight: 82.1 kg (181 lb)  SpO2: 99 %  Physical Exam  Vitals and nursing note reviewed.   Constitutional:       General: He is in acute distress.      Appearance: He is well-developed. He is not toxic-appearing or diaphoretic.      Comments: Patient with wife otherwise vitally stable is nontoxic here alert 9x3.   HENT:      Head: Normocephalic and atraumatic.      Nose: Nose normal.      Mouth/Throat:      Mouth: Mucous membranes are moist.      Pharynx: Oropharynx is clear.   Eyes:      General: No scleral icterus.     Extraocular Movements: Extraocular " movements intact.      Conjunctiva/sclera: Conjunctivae normal.      Pupils: Pupils are equal, round, and reactive to light.   Cardiovascular:      Rate and Rhythm: Normal rate and regular rhythm.   Pulmonary:      Effort: No respiratory distress.      Breath sounds: No stridor. No wheezing.   Abdominal:      General: There is no distension.      Tenderness: There is no abdominal tenderness.   Musculoskeletal:         General: No swelling or tenderness.      Cervical back: Normal range of motion and neck supple. No rigidity.   Skin:     General: Skin is warm and dry.      Capillary Refill: Capillary refill takes less than 2 seconds.      Coloration: Skin is not jaundiced or pale.      Findings: No erythema or rash.   Neurological:      General: No focal deficit present.      Mental Status: He is alert and oriented to person, place, and time. Mental status is at baseline.   Psychiatric:      Comments: Appropriate here           ED Course, Procedures, & Data      Reviewed records reviewed previous blood cultures treatments admissions etc.  Patient valuated here and labs drawn blood cultures x2 from patient left Cm along with peripheral.  We will plan to admit the patient I talked infectious disease.  We will start cefepime as he is tolerated this in the past before.  Discussed with patient and wife patient somewhat reluctant but does agree at this point wife does agree also.  The patient's history we did recommend this as high risk for bacteremia.  Vitally patient has been stable.  Urinalysis without signs of infection.  Procalcitonin elevated 5.26 compared to previous.  White count was 6.8 hemoglobin 10.6.  Platelets are 160 sodium 141 potassium 3.5.  Bicarb 25 gap is 10 glucose is 100 ALT is 89.  Chest x-ray done also reveals an improved left lower basilar consolidation personally reviewed by myself also.    Patient given liter normal saline here in the ER otherwise stable.  Discussed with medicine will plan to  admit for ongoing management of positive blood culture concern for underlying occult bacteremia with previous history high risk.  With gram-negative bacilli noted discussed with ID cefepime was recommended given patient did not have any adverse reactions had this before in the past.    Procedures                     Results for orders placed or performed during the hospital encounter of 08/06/23   XR Chest 2 Views     Status: None    Narrative    Examination: XR CHEST 2 VIEWS 8/6/2023 8:27 AM    Indication: pneumonia bacteremia    Comparison: X-ray 8/4/2023    Findings:  PA and lateral views of the chest. Trachea is midline. Cardiac  silhouette and pulmonary vasculature are within normal limits. No  significant pleural effusion or discernible pneumothorax. Left  dual-lumen IVC terminates over the mid SVC. Improved left basilar  opacities. Unremarkable visualized upper abdomen. No acute osseous  abnormality.      Impression    Impression:   Improved left basilar opacity. No new opacities identified.    I have personally reviewed the examination and initial interpretation  and I agree with the findings.    TIANNA FARMER MD         SYSTEM ID:  [id number]   Comprehensive metabolic panel     Status: Abnormal   Result Value Ref Range    Sodium 141 136 - 145 mmol/L    Potassium 3.5 3.4 - 5.3 mmol/L    Chloride 106 98 - 107 mmol/L    Carbon Dioxide (CO2) 25 22 - 29 mmol/L    Anion Gap 10 7 - 15 mmol/L    Urea Nitrogen 9.2 6.0 - 20.0 mg/dL    Creatinine 0.71 0.67 - 1.17 mg/dL    Calcium 8.7 8.6 - 10.0 mg/dL    Glucose 100 (H) 70 - 99 mg/dL    Alkaline Phosphatase 125 40 - 129 U/L    AST 24 0 - 45 U/L    ALT 89 (H) 0 - 70 U/L    Protein Total 6.7 6.4 - 8.3 g/dL    Albumin 3.7 3.5 - 5.2 g/dL    Bilirubin Total 0.2 <=1.2 mg/dL    GFR Estimate >90 >60 mL/min/1.73m2   Van Draw     Status: None    Narrative    The following orders were created for panel order Van Draw.  Procedure                               Abnormality          Status                     ---------                               -----------         ------                     Extra Blue Top Tube[[id number]]                              Final result               Extra Red Top Tube[[id number]]                               Final result               Extra Green Top (Lithium...[[id number]]                      Final result               Extra Purple Top Tube[[id number]]                            Final result                 Please view results for these tests on the individual orders.   CBC with platelets and differential     Status: Abnormal   Result Value Ref Range    WBC Count 6.8 4.0 - 11.0 10e3/uL    RBC Count 3.63 (L) 4.40 - 5.90 10e6/uL    Hemoglobin 10.6 (L) 13.3 - 17.7 g/dL    Hematocrit 34.3 (L) 40.0 - 53.0 %    MCV 95 78 - 100 fL    MCH 29.2 26.5 - 33.0 pg    MCHC 30.9 (L) 31.5 - 36.5 g/dL    RDW 15.9 (H) 10.0 - 15.0 %    Platelet Count 160 150 - 450 10e3/uL    % Neutrophils 50 %    % Lymphocytes 28 %    % Monocytes 17 %    % Eosinophils 5 %    % Basophils 0 %    % Immature Granulocytes 0 %    NRBCs per 100 WBC 0 <1 /100    Absolute Neutrophils 3.3 1.6 - 8.3 10e3/uL    Absolute Lymphocytes 1.9 0.8 - 5.3 10e3/uL    Absolute Monocytes 1.1 0.0 - 1.3 10e3/uL    Absolute Eosinophils 0.4 0.0 - 0.7 10e3/uL    Absolute Basophils 0.0 0.0 - 0.2 10e3/uL    Absolute Immature Granulocytes 0.0 <=0.4 10e3/uL    Absolute NRBCs 0.0 10e3/uL   Extra Blue Top Tube     Status: None   Result Value Ref Range    Hold Specimen JIC    Extra Red Top Tube     Status: None   Result Value Ref Range    Hold Specimen JIC    Extra Green Top (Lithium Heparin) Tube     Status: None   Result Value Ref Range    Hold Specimen JIC    Extra Purple Top Tube     Status: None   Result Value Ref Range    Hold Specimen JIC    Lactic acid whole blood     Status: Abnormal   Result Value Ref Range    Lactic Acid 0.6 (L) 0.7 - 2.0 mmol/L   Procalcitonin     Status: Abnormal   Result Value Ref Range     Procalcitonin 5.26 (HH) <0.05 ng/mL   UA with Microscopic reflex to Culture     Status: Abnormal    Specimen: Urine, Midstream   Result Value Ref Range    Color Urine Light Yellow Colorless, Straw, Light Yellow, Yellow    Appearance Urine Clear Clear    Glucose Urine Negative Negative mg/dL    Bilirubin Urine Negative Negative    Ketones Urine Negative Negative mg/dL    Specific Gravity Urine 1.009 1.003 - 1.035    Blood Urine Small (A) Negative    pH Urine 7.0 5.0 - 7.0    Protein Albumin Urine Negative Negative mg/dL    Urobilinogen Urine Normal Normal, 2.0 mg/dL    Nitrite Urine Negative Negative    Leukocyte Esterase Urine Negative Negative    Mucus Urine Present (A) None Seen /LPF    RBC Urine 2 <=2 /HPF    WBC Urine 1 <=5 /HPF    Squamous Epithelials Urine <1 <=1 /HPF    Narrative    Urine Culture not indicated   CBC with Platelets & Differential     Status: Abnormal    Narrative    The following orders were created for panel order CBC with Platelets & Differential.  Procedure                               Abnormality         Status                     ---------                               -----------         ------                     CBC with platelets and d...[[id number]]  Abnormal            Final result                 Please view results for these tests on the individual orders.     Medications   ceFEPIme (MAXIPIME) 2 g vial to attach to  ml bag for ADULTS or 50 ml bag for PEDS (has no administration in time range)   melatonin tablet 1 mg (has no administration in time range)   ondansetron (ZOFRAN ODT) ODT tab 4 mg (has no administration in time range)     Or   ondansetron (ZOFRAN) injection 4 mg (has no administration in time range)   acetaminophen (TYLENOL) tablet 650 mg (has no administration in time range)     Or   acetaminophen (TYLENOL) Suppository 650 mg (has no administration in time range)   senna-docusate (SENOKOT-S/PERICOLACE) 8.6-50 MG per tablet 1 tablet (has no administration in time  range)     Or   senna-docusate (SENOKOT-S/PERICOLACE) 8.6-50 MG per tablet 2 tablet (has no administration in time range)   polyethylene glycol (MIRALAX) Packet 17 g (has no administration in time range)   prochlorperazine (COMPAZINE) injection 10 mg (has no administration in time range)     Or   prochlorperazine (COMPAZINE) tablet 10 mg (has no administration in time range)     Or   prochlorperazine (COMPAZINE) suppository 25 mg (has no administration in time range)   albuterol (PROVENTIL HFA/VENTOLIN HFA) inhaler (has no administration in time range)   amphetamine-dextroamphetamine (ADDERALL) per tablet 20 mg (has no administration in time range)   carvedilol (COREG) tablet 6.25 mg (has no administration in time range)   cyanocobalamin injection 1,000 mcg ( Intramuscular Automatically Held 10/5/23 1630)   enoxaparin ANTICOAGULANT (LOVENOX) injection 80 mg (has no administration in time range)   fentaNYL (DURAGESIC) 25 mcg/hr 72 hr patch 1 patch (has no administration in time range)     And   fentaNYL (DURAGESIC) Patch in Place (has no administration in time range)   lidocaine (LIDODERM) 5 % Patch 1-2 patch (has no administration in time range)   LORazepam (ATIVAN) tablet 1 mg (has no administration in time range)   naloxone (NARCAN) nasal spray 4 mg (has no administration in time range)   nystatin (MYCOSTATIN) cream (has no administration in time range)   ondansetron (ZOFRAN ODT) ODT tab 8 mg (has no administration in time range)   oxyCODONE (ROXICODONE) solution 5-10 mg (has no administration in time range)   pantoprazole (PROTONIX) EC tablet 40 mg (has no administration in time range)   polyethylene glycol (MIRALAX) powder 17 g (has no administration in time range)   sucralfate (CARAFATE) suspension 1 g (has no administration in time range)   vitamin D2 (ERGOCALCIFEROL) 35731 units (1250 mcg) capsule 50,000 Units ( Oral Automatically Held 8/20/23 1630)   lipids plant base (CLINOLIPID) 20 % infusion 250 mL (has  no administration in time range)   dextrose 10% infusion (has no administration in time range)   parenteral nutrition - ADULT compounded formula CYCLE (has no administration in time range)   ceFEPIme (MAXIPIME) 2 g vial to attach to  ml bag for ADULTS or 50 ml bag for PEDS (2 g Intravenous $New Bag 8/6/23 0822)   0.9% sodium chloride BOLUS (1,000 mLs Intravenous $New Bag 8/6/23 0817)   oxyCODONE (ROXICODONE) solution 10 mg (10 mg Oral $Given 8/6/23 1310)     Labs Ordered and Resulted from Time of ED Arrival to Time of ED Departure   COMPREHENSIVE METABOLIC PANEL - Abnormal       Result Value    Sodium 141      Potassium 3.5      Chloride 106      Carbon Dioxide (CO2) 25      Anion Gap 10      Urea Nitrogen 9.2      Creatinine 0.71      Calcium 8.7      Glucose 100 (*)     Alkaline Phosphatase 125      AST 24      ALT 89 (*)     Protein Total 6.7      Albumin 3.7      Bilirubin Total 0.2      GFR Estimate >90     CBC WITH PLATELETS AND DIFFERENTIAL - Abnormal    WBC Count 6.8      RBC Count 3.63 (*)     Hemoglobin 10.6 (*)     Hematocrit 34.3 (*)     MCV 95      MCH 29.2      MCHC 30.9 (*)     RDW 15.9 (*)     Platelet Count 160      % Neutrophils 50      % Lymphocytes 28      % Monocytes 17      % Eosinophils 5      % Basophils 0      % Immature Granulocytes 0      NRBCs per 100 WBC 0      Absolute Neutrophils 3.3      Absolute Lymphocytes 1.9      Absolute Monocytes 1.1      Absolute Eosinophils 0.4      Absolute Basophils 0.0      Absolute Immature Granulocytes 0.0      Absolute NRBCs 0.0     LACTIC ACID WHOLE BLOOD - Abnormal    Lactic Acid 0.6 (*)    PROCALCITONIN - Abnormal    Procalcitonin 5.26 (*)    ROUTINE UA WITH MICROSCOPIC REFLEX TO CULTURE - Abnormal    Color Urine Light Yellow      Appearance Urine Clear      Glucose Urine Negative      Bilirubin Urine Negative      Ketones Urine Negative      Specific Gravity Urine 1.009      Blood Urine Small (*)     pH Urine 7.0      Protein Albumin Urine  Negative      Urobilinogen Urine Normal      Nitrite Urine Negative      Leukocyte Esterase Urine Negative      Mucus Urine Present (*)     RBC Urine 2      WBC Urine 1      Squamous Epithelials Urine <1     BLOOD CULTURE   BLOOD CULTURE     XR Chest 2 Views   Final Result   Impression:    Improved left basilar opacity. No new opacities identified.      I have personally reviewed the examination and initial interpretation   and I agree with the findings.      TIANNA FARMER MD            SYSTEM ID:  [id number]             Critical care was not performed.     Medical Decision Making  The patient's presentation was of high complexity (an acute health issue posing potential threat to life or bodily function).    The patient's evaluation involved:  review of external note(s) from 3+ sources (see separate area of note for details)  ordering and/or review of 3+ test(s) in this encounter (see separate area of note for details)  review of 3+ test result(s) ordered prior to this encounter (see separate area of note for details)  discussion of management or test interpretation with another health professional (see separate area of note for details)    The patient's management necessitated high risk (a decision regarding hospitalization).    Assessment & Plan   52-year-old male history of recurrent bacteremia recent pneumonia treated with IV antibiotics patient recent pneumonia with positive blood culture now of gram-negative bacilli.  Patient structured to return.  Patient had some fevers etc. presented to ER vitally stable otherwise afebrile chest x-ray shows a resolving left lower lobe consolidation labs otherwise stable although procalcitonin is 5 slightly up from previous.  Blood cultures were sent x2 as he does have a left anterior chest Cm catheter that he used for TPN also.  Discussed with infectious disease reviewed records etc. patient prescribed cefepime IV patient admitted to medicine for ongoing evaluation  follow-up on repeat blood cultures and further assessment       I have reviewed the nursing notes. I have reviewed the findings, diagnosis, plan and need for follow up with the patient.    Current Discharge Medication List          Final diagnoses:   Bacteremia       Jeet Orozco  [facility] EMERGENCY DEPARTMENT  8/6/2023    This note was created at least in part by the use of dragon voice dictation system. Inadvertent typographical errors may still exist.  Jeet Orozco MD.  Patient evaluated in the emergency department during the COVID-19 pandemic period. Careful attention to patients safety was addressed throughout the evaluation. Evaluation and treatment management was initiated with disposition made efficiently and appropriate as possible to minimize any risk of potential exposure to patient during this evaluation.       Jeet Orozco MD  08/06/23 [id number]

## 2023-08-06 NOTE — PROGRESS NOTES
CLINICAL NUTRITION SERVICES - ASSESSMENT NOTE     Nutrition Prescription    RECOMMENDATIONS FOR MDs/PROVIDERS TO ORDER:  None currently    Malnutrition Status:    Unable to determine due to unable to complete NFPE    Recommendations already ordered by Registered Dietitian (RD):  Dosing weight:  82.1 kg  Access: central    Initial parameters (per day)  Volume:  1000 mL x 12 hour cyclic  Dextrose: 175 g  AA: 100 g  Lipids: 250 mL 20%, 3 days per week (Mon, Wed, Fri)    Dextrose titration: Resuming home TPN so OK to begin at goal dextrose.     Additives: 10 mL infuvite, 1 mL trelement     Goal PN provides 175 g dextrose, 100 g AA, and 250 mL 20% lipids 3 days per week for total provision of 1209 Kcals (23 Kcals/kg), 100 g/kg protein, GIR 3.0 mg/kg/minute, and 18% fat kcals on average daily.     Sent message to [facility] RD with handoff as patient is transferring there today per EMR.     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 During that admission, pt had PPN d/t bacteremia suspected to by caused by PICC. Noted wt loss over the past month, suspect somewhat related to inadequate nutrition with need for PPN.   Per RD note 7/05/23: Pt on TPN via PICC PTA d/t short gut syndrome, complications of RYNGB in 2003. Pt also has hx of macrocytic anemia and is on cyanocobalamin 1000 mcg IM monthly.   Pt reports using [facility] for TPN. Per [facility], home TPN was:  MWF:  " 1000 mL TPN + 250 mL lipids (1250 mL total volume) run over 12 hours with 1 hr taper up and down.   T, Th, Sat, Sun: 1250 mL TPN (no lipids) run over 12 hours with 1 hr taper up and down.  Daily TPN consists of 100 g AA, 175 g dex; Na 50 mEq, K 70 mEq, Ca 12 mEq, Mg 6 mEq, Phos 15 mmol, 29% chloride, infuvite 10 mL, trelement 1 mL, famotidine 20 mg, zinc 5 mg (in addition to trelement).    Pt reports eating about 300-400 kcal/day. He reports worsening reflux difficulties d/t hiatal hernia and tolerates grains best (bread, pasta).      CURRENT NUTRITION ORDERS  Diet:  None    Intake/Tolerance: No documented intakes to assess.    LABS   08/01/23 12:25 08/04/23 03:37 08/06/23 06:33 08/06/23 08:18   Sodium 142 139 141    Potassium 3.5 3.4 3.5    Chloride 106 103 106    Carbon Dioxide (CO2) 26 26 25    Urea Nitrogen 18.7 9.8 9.2    Creatinine 0.73 0.76 0.71    GFR Estimate >90 >90 >90    Calcium 8.2 (L) 8.3 (L) 8.7    Anion Gap 10 10 10    Magnesium 2.0      Phosphorus 3.2      Albumin 3.8 3.6 3.7    Protein Total 6.3 (L) 6.5 6.7    Alkaline Phosphatase 60 187 (H) 125    ALT 19 211 (H) 89 (H)    AST 23 179 (H) 24    Bilirubin Direct <0.20      Bilirubin Total 0.3 0.3 0.2    Glucose 99 103 (H) 100 (H)    Lactic Acid  0.8  0.6 (L)   Procalcitonin  2.49 (H) 5.26 (HH)    Triglycerides 79        MEDICATIONS  Reviewed     ANTHROPOMETRICS  Height: 180.3 cm (5' 11\")  Most Recent Weight: 82.1 kg (181 lb)    IBW: 78.2 kg   Body mass index is 25.24 kg/m . BMI Category: Overweight BMI 25-29.9  Weight History:  4.7 kg (5.4%) weight loss over 1 month.  Wt Readings from Last 15 Encounters:   08/06/23 82.1 kg (181 lb)   08/04/23 86.6 kg (191 lb)   07/11/23 86.8 kg (191 lb 6.4 oz)   06/07/23 87.7 kg (193 lb 6.4 oz)   06/04/23 87.1 kg (192 lb)   06/02/23 79 kg (174 lb 1.6 oz)   06/01/23 85.9 kg (189 lb 6.4 oz)   05/28/23 84.9 kg (187 lb 1.6 oz)   01/21/23 82.6 kg (182 lb)   01/13/23 85.3 kg (188 lb)   09/27/22 80.1 kg (176 lb 8 oz) "   08/10/22 76.7 kg (169 lb 3.2 oz)   08/09/22 76.7 kg (169 lb)   07/16/22 77.1 kg (170 lb)   07/12/22 84.1 kg (185 lb 6.5 oz)         ASSESSED NUTRITION NEEDS  Dosing Weight: 82.1 kg (Actual BW)   Estimated Energy Needs: 5220-8336 kcals/day (20 - 25 kcals/kg)  Justification: Maintenance  Estimated Protein Needs: 82-99 grams protein/day (1 - 1.2 grams of pro/kg)  Justification: Maintenance  Estimated Fluid Needs: 0710-2324 mL/day (1 mL/kcal)   Justification: Maintenance    PHYSICAL FINDINGS  See malnutrition section below.       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       INTERVENTIONS  Implementation  Nutrition Education: will be provided if nutrition education needs arise.    Collaboration with other providers  Parenteral Nutrition/IV Fluids - Initiate     Goals  Total avg nutritional intake to meet a minimum of 20 kcal/kg and 1.0 g PRO/kg daily (per dosing wt 82.1 kg).        Monitoring/Evaluation  Progress toward goals will be monitored and evaluated per protocol.    Elaine Allan RDN,   6D/ED RD pager: [phone]  Weekend/Holiday RD pager: [phone]

## 2023-08-07 ENCOUNTER — PATIENT OUTREACH (OUTPATIENT)
Dept: CARE COORDINATION | Facility: CLINIC | Age: 53
End: 2023-08-07
Payer: COMMERCIAL

## 2023-08-07 VITALS
HEART RATE: 90 BPM | HEIGHT: 71 IN | OXYGEN SATURATION: 99 % | TEMPERATURE: 98.1 F | DIASTOLIC BLOOD PRESSURE: 87 MMHG | WEIGHT: 187.39 LBS | RESPIRATION RATE: 16 BRPM | BODY MASS INDEX: 26.23 KG/M2 | SYSTOLIC BLOOD PRESSURE: 121 MMHG

## 2023-08-07 LAB
ALBUMIN SERPL BCG-MCNC: 3.6 G/DL (ref 3.5–5.2)
ALP SERPL-CCNC: 115 U/L (ref 40–129)
ALT SERPL W P-5'-P-CCNC: 68 U/L (ref 0–70)
ANION GAP SERPL CALCULATED.3IONS-SCNC: 7 MMOL/L (ref 7–15)
AST SERPL W P-5'-P-CCNC: 17 U/L (ref 0–45)
BACTERIA BLD CULT: ABNORMAL
BACTERIA BLD CULT: ABNORMAL
BASOPHILS # BLD AUTO: 0 10E3/UL (ref 0–0.2)
BASOPHILS NFR BLD AUTO: 1 %
BILIRUB DIRECT SERPL-MCNC: <0.2 MG/DL (ref 0–0.3)
BILIRUB SERPL-MCNC: 0.3 MG/DL
BUN SERPL-MCNC: 13.5 MG/DL (ref 6–20)
CALCIUM SERPL-MCNC: 8.7 MG/DL (ref 8.6–10)
CHLORIDE SERPL-SCNC: 107 MMOL/L (ref 98–107)
CREAT SERPL-MCNC: 0.59 MG/DL (ref 0.67–1.17)
CRP SERPL-MCNC: 15.21 MG/L
DEPRECATED HCO3 PLAS-SCNC: 28 MMOL/L (ref 22–29)
EOSINOPHIL # BLD AUTO: 0.4 10E3/UL (ref 0–0.7)
EOSINOPHIL NFR BLD AUTO: 6 %
ERYTHROCYTE [DISTWIDTH] IN BLOOD BY AUTOMATED COUNT: 15.6 % (ref 10–15)
GFR SERPL CREATININE-BSD FRML MDRD: >90 ML/MIN/1.73M2
GLUCOSE BLDC GLUCOMTR-MCNC: 100 MG/DL (ref 70–99)
GLUCOSE BLDC GLUCOMTR-MCNC: 127 MG/DL (ref 70–99)
GLUCOSE BLDC GLUCOMTR-MCNC: 131 MG/DL (ref 70–99)
GLUCOSE SERPL-MCNC: 85 MG/DL (ref 70–99)
HCT VFR BLD AUTO: 35.1 % (ref 40–53)
HGB BLD-MCNC: 11.2 G/DL (ref 13.3–17.7)
IMM GRANULOCYTES # BLD: 0 10E3/UL
IMM GRANULOCYTES NFR BLD: 0 %
INR PPP: 1.09 (ref 0.85–1.15)
LYMPHOCYTES # BLD AUTO: 1.8 10E3/UL (ref 0.8–5.3)
LYMPHOCYTES NFR BLD AUTO: 30 %
MAGNESIUM SERPL-MCNC: 2.1 MG/DL (ref 1.7–2.3)
MCH RBC QN AUTO: 30.1 PG (ref 26.5–33)
MCHC RBC AUTO-ENTMCNC: 31.9 G/DL (ref 31.5–36.5)
MCV RBC AUTO: 94 FL (ref 78–100)
MONOCYTES # BLD AUTO: 1 10E3/UL (ref 0–1.3)
MONOCYTES NFR BLD AUTO: 16 %
NEUTROPHILS # BLD AUTO: 2.8 10E3/UL (ref 1.6–8.3)
NEUTROPHILS NFR BLD AUTO: 47 %
NRBC # BLD AUTO: 0 10E3/UL
NRBC BLD AUTO-RTO: 0 /100
PHOSPHATE SERPL-MCNC: 3.6 MG/DL (ref 2.5–4.5)
PLATELET # BLD AUTO: 180 10E3/UL (ref 150–450)
POTASSIUM SERPL-SCNC: 4.2 MMOL/L (ref 3.4–5.3)
PREALB SERPL IA-MCNC: 15 MG/DL (ref 15–45)
PROT SERPL-MCNC: 6.3 G/DL (ref 6.4–8.3)
RBC # BLD AUTO: 3.72 10E6/UL (ref 4.4–5.9)
SODIUM SERPL-SCNC: 142 MMOL/L (ref 136–145)
WBC # BLD AUTO: 6.1 10E3/UL (ref 4–11)

## 2023-08-07 PROCEDURE — 250N000011 HC RX IP 250 OP 636: Performed by: PEDIATRICS

## 2023-08-07 PROCEDURE — 86140 C-REACTIVE PROTEIN: CPT

## 2023-08-07 PROCEDURE — 250N000013 HC RX MED GY IP 250 OP 250 PS 637

## 2023-08-07 PROCEDURE — 85004 AUTOMATED DIFF WBC COUNT: CPT

## 2023-08-07 PROCEDURE — 82248 BILIRUBIN DIRECT: CPT | Performed by: STUDENT IN AN ORGANIZED HEALTH CARE EDUCATION/TRAINING PROGRAM

## 2023-08-07 PROCEDURE — 84100 ASSAY OF PHOSPHORUS: CPT | Performed by: STUDENT IN AN ORGANIZED HEALTH CARE EDUCATION/TRAINING PROGRAM

## 2023-08-07 PROCEDURE — 80053 COMPREHEN METABOLIC PANEL: CPT

## 2023-08-07 PROCEDURE — 250N000011 HC RX IP 250 OP 636: Performed by: STUDENT IN AN ORGANIZED HEALTH CARE EDUCATION/TRAINING PROGRAM

## 2023-08-07 PROCEDURE — 85610 PROTHROMBIN TIME: CPT | Performed by: STUDENT IN AN ORGANIZED HEALTH CARE EDUCATION/TRAINING PROGRAM

## 2023-08-07 PROCEDURE — 84134 ASSAY OF PREALBUMIN: CPT | Performed by: STUDENT IN AN ORGANIZED HEALTH CARE EDUCATION/TRAINING PROGRAM

## 2023-08-07 PROCEDURE — 258N000003 HC RX IP 258 OP 636

## 2023-08-07 PROCEDURE — 250N000011 HC RX IP 250 OP 636: Mod: JZ

## 2023-08-07 PROCEDURE — 83735 ASSAY OF MAGNESIUM: CPT | Performed by: STUDENT IN AN ORGANIZED HEALTH CARE EDUCATION/TRAINING PROGRAM

## 2023-08-07 PROCEDURE — 87040 BLOOD CULTURE FOR BACTERIA: CPT

## 2023-08-07 PROCEDURE — 99239 HOSP IP/OBS DSCHRG MGMT >30: CPT | Mod: GC | Performed by: STUDENT IN AN ORGANIZED HEALTH CARE EDUCATION/TRAINING PROGRAM

## 2023-08-07 PROCEDURE — 99223 1ST HOSP IP/OBS HIGH 75: CPT | Performed by: INTERNAL MEDICINE

## 2023-08-07 PROCEDURE — 36415 COLL VENOUS BLD VENIPUNCTURE: CPT | Performed by: STUDENT IN AN ORGANIZED HEALTH CARE EDUCATION/TRAINING PROGRAM

## 2023-08-07 PROCEDURE — 82306 VITAMIN D 25 HYDROXY: CPT | Performed by: STUDENT IN AN ORGANIZED HEALTH CARE EDUCATION/TRAINING PROGRAM

## 2023-08-07 RX ORDER — ACETAMINOPHEN 325 MG/10.15ML
650 LIQUID ORAL EVERY 6 HOURS PRN
Status: DISCONTINUED | OUTPATIENT
Start: 2023-08-07 | End: 2023-08-07 | Stop reason: HOSPADM

## 2023-08-07 RX ORDER — NICOTINE 21 MG/24HR
1 PATCH, TRANSDERMAL 24 HOURS TRANSDERMAL DAILY
Status: DISCONTINUED | OUTPATIENT
Start: 2023-08-07 | End: 2023-08-07 | Stop reason: HOSPADM

## 2023-08-07 RX ORDER — LEVOFLOXACIN 25 MG/ML
750 SOLUTION ORAL DAILY
Qty: 300 ML | Refills: 0 | Status: SHIPPED | OUTPATIENT
Start: 2023-08-07 | End: 2023-08-17

## 2023-08-07 RX ORDER — POLYETHYLENE GLYCOL 3350 17 G
2 POWDER IN PACKET (EA) ORAL
Status: DISCONTINUED | OUTPATIENT
Start: 2023-08-07 | End: 2023-08-07 | Stop reason: HOSPADM

## 2023-08-07 RX ORDER — AZITHROMYCIN 250 MG/1
250 TABLET, FILM COATED ORAL DAILY
Status: DISCONTINUED | OUTPATIENT
Start: 2023-08-07 | End: 2023-08-07 | Stop reason: HOSPADM

## 2023-08-07 RX ORDER — SODIUM CHLORIDE 9 MG/ML
INJECTION, SOLUTION INTRAVENOUS
Status: COMPLETED
Start: 2023-08-07 | End: 2023-08-07

## 2023-08-07 RX ADMIN — CARVEDILOL 6.25 MG: 6.25 TABLET, FILM COATED ORAL at 07:56

## 2023-08-07 RX ADMIN — AZITHROMYCIN 250 MG: 250 TABLET, FILM COATED ORAL at 11:33

## 2023-08-07 RX ADMIN — OXYCODONE HYDROCHLORIDE 10 MG: 5 SOLUTION ORAL at 12:24

## 2023-08-07 RX ADMIN — SUCRALFATE 1 G: 1 SUSPENSION ORAL at 11:33

## 2023-08-07 RX ADMIN — OXYCODONE HYDROCHLORIDE 10 MG: 5 SOLUTION ORAL at 17:04

## 2023-08-07 RX ADMIN — HEPARIN SODIUM: 1000 INJECTION INTRAVENOUS; SUBCUTANEOUS at 17:54

## 2023-08-07 RX ADMIN — OXYCODONE HYDROCHLORIDE 10 MG: 5 SOLUTION ORAL at 08:17

## 2023-08-07 RX ADMIN — SUCRALFATE 1 G: 1 SUSPENSION ORAL at 16:43

## 2023-08-07 RX ADMIN — SODIUM CHLORIDE 500 ML: 9 INJECTION, SOLUTION INTRAVENOUS at 01:45

## 2023-08-07 RX ADMIN — CARVEDILOL 6.25 MG: 6.25 TABLET, FILM COATED ORAL at 17:05

## 2023-08-07 RX ADMIN — OXYCODONE HYDROCHLORIDE 10 MG: 5 SOLUTION ORAL at 01:27

## 2023-08-07 RX ADMIN — CEFEPIME HYDROCHLORIDE 2 G: 2 INJECTION, POWDER, FOR SOLUTION INTRAVENOUS at 07:56

## 2023-08-07 RX ADMIN — CEFEPIME HYDROCHLORIDE 2 G: 2 INJECTION, POWDER, FOR SOLUTION INTRAVENOUS at 01:27

## 2023-08-07 RX ADMIN — DEXTROAMPHETAMINE SACCHARATE, AMPHETAMINE ASPARTATE, DEXTROAMPHETAMINE SULFATE AND AMPHETAMINE SULFATE 20 MG: 5; 5; 5; 5 TABLET ORAL at 07:56

## 2023-08-07 RX ADMIN — SUCRALFATE 1 G: 1 SUSPENSION ORAL at 07:56

## 2023-08-07 RX ADMIN — ONDANSETRON 8 MG: 4 TABLET, ORALLY DISINTEGRATING ORAL at 17:05

## 2023-08-07 RX ADMIN — ONDANSETRON 8 MG: 4 TABLET, ORALLY DISINTEGRATING ORAL at 08:24

## 2023-08-07 RX ADMIN — ENOXAPARIN SODIUM 80 MG: 80 INJECTION SUBCUTANEOUS at 07:56

## 2023-08-07 RX ADMIN — NYSTATIN: 100000 CREAM TOPICAL at 08:00

## 2023-08-07 RX ADMIN — CEFEPIME HYDROCHLORIDE 2 G: 2 INJECTION, POWDER, FOR SOLUTION INTRAVENOUS at 16:42

## 2023-08-07 ASSESSMENT — ACTIVITIES OF DAILY LIVING (ADL)
DEPENDENT_IADLS:: INDEPENDENT
ADLS_ACUITY_SCORE: 25

## 2023-08-07 NOTE — PROGRESS NOTES
Clinic Care Coordination Contact  Ambulatory Care Coordination to Inpatient Care Management   Hand-In Communication    Date:  August 7, 2023  Name: Parker Acevedo is enrolled in Ambulatory Care Coordination program and I am the Lead Care Coordinator.  CC Contact Information: Epic [employer] + phone  Payor Source: Payor: [facility] / Plan: [facility] MEDICARE ADVANTAGE / Product Type: Medicare /   Current services in place:     Please see the CC Snaphot and Care Management Flowsheets for specific  details of this Parker Acevedo care plan.   Additional details/specific concerns r/t this admission:    No additional concerns at this time .    I will follow this admission in Epic. Please feel free to contact me with questions or for further collaboration in discharge planning.    Kinsey Muhammad RN Care Coordination   [facility]  Email: [email or web address]  Phone: [phone]

## 2023-08-07 NOTE — CONSULTS
Care Management Initial Consult / Discharge Note    General Information  Assessment completed with: Patient, VM-chart review,         Primary Care Provider verified and updated as needed:  Yes  Readmission within the last 30 days: current reason for admission unrelated to previous admission   Return Category: New Diagnosis  Reason for Consult: discharge planning, other (see comments) (Elevated risk score)  Advance Care Planning: Advance Care Planning Reviewed: present on chart          Communication Assessment  Patient's communication style: spoken language (English or Bilingual)    Hearing Difficulty or Deaf: no   Wear Glasses or Blind: yes    Cognitive  Cognitive/Neuro/Behavioral: WDL                      Living Environment:   People in home: spouse, child(francheska), adult (son, age 28)     Current living Arrangements: house      Able to return to prior arrangements: yes       Family/Social Support:  Care provided by: self, child(francheska), homecare agency  Provides care for: pet(s) (dogs)     Wife, Children          Description of Support System: Supportive, Involved         Current Resources:   Patient receiving home care services: Yes ([facility])  Skilled Home Care Services: Skilled Nursing ([facility])  Community Resources: Home Infusion, Home Care  Equipment currently used at home: cane, straight, grab bar, toilet, grab bar, tub/shower, shower chair, other (see comments) (stair lift)  Supplies currently used at home: Gloves, Other (TPN)    Employment/Financial:  Employment Status: disabled        Financial Concerns: No concerns identified (per pt statement)           Does the patient's insurance plan have a 3 day qualifying hospital stay waiver?  TBD    Lifestyle & Psychosocial Needs:  Social Determinants of Health     Tobacco Use: High Risk (7/10/2023)    Patient History     Smoking Tobacco Use: Light Smoker     Smokeless Tobacco Use: Never     Passive Exposure: Not on file   Alcohol Use: Not At Risk  (8/4/2020)    AUDIT-C     Frequency of Alcohol Consumption: Never     Average Number of Drinks: Patient refused     Frequency of Binge Drinking: Never   Financial Resource Strain: Medium Risk (5/20/2022)    Overall Financial Resource Strain (CARDIA)     Difficulty of Paying Living Expenses: Somewhat hard   Food Insecurity: No Food Insecurity (5/20/2022)    Hunger Vital Sign     Worried About Running Out of Food in the Last Year: Never true     Ran Out of Food in the Last Year: Never true   Transportation Needs: No Transportation Needs (5/20/2022)    PRAPARE - Transportation     Lack of Transportation (Medical): No     Lack of Transportation (Non-Medical): No   Physical Activity: Unknown (8/4/2020)    Exercise Vital Sign     Days of Exercise per Week: 2 days     Minutes of Exercise per Session: Not on file   Stress: No Stress Concern Present (8/4/2020)    [employer] - Occupational Stress Questionnaire     Feeling of Stress : Only a little   Social Connections: Socially Isolated (8/4/2020)    Social Connection and Isolation Panel [NHANES]     Frequency of Communication with Friends and Family: Once a week     Frequency of Social Gatherings with Friends and Family: Never     Attends Religion Services: Never     Active Member of Clubs or Organizations: No     Attends Club or Organization Meetings: Never     Marital Status:    Intimate Partner Violence: Not At Risk (8/4/2020)    Humiliation, Afraid, Rape, and Kick questionnaire     Fear of Current or Ex-Partner: No     Emotionally Abused: No     Physically Abused: No     Sexually Abused: No   Depression: Not at risk (2/8/2023)    PHQ-2     PHQ-2 Score: 0   Housing Stability: Low Risk  (8/4/2020)    Housing Stability Vital Sign     Unable to Pay for Housing in the Last Year: No     Number of Places Lived in the Last Year: 1     Unstable Housing in the Last Year: No       Functional Status:  Prior to admission patient needed  "assistance:   Dependent ADLs:: Ambulation-cane (does not always use cane, per pt statement)  Dependent IADLs:: Independent       Mental Health Status:  Mental Health Status: No Current Concerns (per pt statement)       Chemical Dependency Status:  Chemical Dependency Status: No Current Concerns (per pt statement)             Values/Beliefs:  Spiritual, Cultural Beliefs, Taoist Practices, Values that affect care: no (per pt statement)               Additional Information:  Provider informed this writer that pt's estimated discharge date is pending an abx plan, ID consult currently pending.    Received message from [facility] liaison.  She stated pt is open to hospitals for central line care, LR Hydration, and TPN.  Pt's home care agency is [facility].  SHAN order will be needed at discharge.  Updated liaison to [facility] TBD pending abx plan, liaison acknowledged.    Sent request to hospitals to check benefits in the event pt needs IV abx at discharge.  Received the following response:  \"IV Abx is covered via CADD pump\"    2:15pm - hospitals liaison inquired w/ this writer if pt is to discharge today, stated that orders must be signed by 3pm if that is the case.    2:20pm - Paged provider, received prompt call back.  Provider clarified they intend to discharge pt today as the plan has changed to oral abx.    Updated hospitals liaison.    SHAN orders placed for provider signature for My and hospitals.    2:28pm - Attempted to meet w/ pt at bedside, pt not present in room.    2:35pm - Received call from provider.  She updated this writer that pt will need gentamicin line locks.  Informed provider that SHAN orders were placed and all orders, including the gentamicin locks, need to be signed by 3pm for I, provider acknowledged.    Updated hospitals liaison.    Home infusion referral order entered for provider signature.    2:42pm - Pt still not present in room.    hospitals liaison requested provider to call [facility] to order " gentamicin locks:  [phone], option 2.  Ask for green [facility].    2:46pm - Paged provider the request from I liaison.  Provided call back number if questions.    2:54pm - Received call from provider, she updated this writer that she spoke with the [facility], there is some wiggle room w/ the 3pm time since pt is open to \[facility]\"" for other services, and provider is waiting on ID pharm to put order for gent locks in.    Updated I liaison.    I liaison informed this writer that orders need to be signed by 4pm, as they need to deliver to pt's home tonight.  She stated he must receive a dose of the gent loc to ensure he tolerates it, explained this is standard procedure for new drug.    3:21pm - Paged provider request from I liayamileth, provided call back number if questions.  Received call back, provider opted to sign everything including discharge order.  She clarified pt is to discharge once he receives gent dose at 4pm.    Updated I liaison.    3:31pm - I liaison updated this writer that pt did not refrigerate hydration and TPN at home, so he has none available to infuse.  She stated pt will need to discharge tomorrow morning d/t this.    3:40pm - Paged provider regarding delayed discharge, provided call back number if questions.  Received call back, provider appreciated update.  Informed provider that, while on this very call, this writer was notified by I liaison that pt may have necessary supplies refrigerated, per his spouse.  I liaison will update this writer ASAP regarding if discharge needs to be postponed.  Provider acknowledged.  RNCC to update provider ASAP regarding discharge timing.    3:45pm - Attempted to meet w/ pt, he is in the bathroom, requested this writer to return in 10 min.    I liaison updated this writer that pt can discharge today if he tolerates the gent, that I will deliver to home by end of day today, and that a skilled nurse visit will occur tomorrow for gent admin.    3:52pm - Paged  provider the update from I liaison, provided call back number if questions.    4:00pm - Met w/ pt at bedside, introduced self and role, conducted CMA, and discussed discharge planning.  Pt has no concerns w/ discharging today and was appreciative for care mgmt's assistance in coordinating w/ [facility].  He stated Kayy from [facility] updated him on the plan already.  He had no further questions/concerns for this writer and was appreciative for the meeting.      Care Management Discharge Note    Discharge Date: 08/07/2023       Discharge Disposition: Home Infusion    Discharge Services: None    Discharge DME: Other (see comment) (No new DME anticipated)    Discharge Transportation: family or friend will provide    Private pay costs discussed: Not applicable    Does the patient's insurance plan have a 3 day qualifying hospital stay waiver?  TBD    PAS Confirmation Code: N/A  Patient/family educated on Medicare website which has current facility and service quality ratings: no (N/A at this time)    Education Provided on the Discharge Plan: Yes  Persons Notified of Discharge Plans: pt; I liaison; provider  Patient/Family in Agreement with the Plan: yes    Handoff Referral Completed: Yes - internal    Additional Information:  All care coordination needs have been addressed at this time.  Please reach out to RNCC/SW if additional needs arise prior to discharge.          Care management available as needed.    GERRI Berry  [facility]  Units 8M/S & 10 ICU  Pager: [phone]  Phone: [phone]

## 2023-08-07 NOTE — PLAN OF CARE
"/82 (BP Location: Right arm)   Pulse 60   Temp 98.1  F (36.7  C) (Oral)   Resp 18   Ht 1.803 m (5' 11\")   Wt 85 kg (187 lb 6.3 oz)   SpO2 100%   BMI 26.14 kg/m    Pt is alert and oriented x4, able to make needs known, on room air    Med for pain with prn oxycodone, TPN infused, IV abx given, blood cultures drawn from purple port, dressing changed to central line, pt goes on frequent walks, refused lipids and nicotine patch. Pt. discharged at 1900 to home, and left with personal belongings. Pt. received complete discharge paperwork and antibiotic medications as filled by discharge pharmacy. Pt. was given times of last dose for all discharge medications in writing on discharge medication sheets. Discharge teaching included antibiotic medication, pain management, activity restrictions, dressing changes, and signs and symptoms of infection. Dressing supplies sent home. Pt. to follow up with PCP in 1 week. Pt. had no further questions at the time of discharge and no unmet needs were identified.        "

## 2023-08-07 NOTE — PROGRESS NOTES
Infusion Therapy-[facility]-Nurse Liaison-Current [facility] patient    Pt has been on service with [facility] prior to this hospital stay and will resume care with [facility].   DC DATE: today  DC THERAPIES: SHAN LR-2000mls daily PRN, TPN.  NEW-GENT/hep LOCS  LINE: LISSA Hernandez  DEELIVERY: to pt home as soon as able  AGENCY: [facility]  SNV: REQUIRED TUES For education   NOTE  Per Mom Patient, No changes to demographics, insurance, allergies, etc.  He is independent with TPN and Hydration infusions. Gent locs will have snv tomorrow for education with regional agency.  He verbalizes understanding, and knows how to contact  [facility], also understands we have an RN/RPH on call 24/7. This Liaison will continue to follow until dc for any changes or additional needs.        Kayy Parra, [facility],Nurse Liaison  [email or web address]  My Cell:  [phone] Mon-Fri  [facility] OFFICE  24 hrs  [phone]

## 2023-08-07 NOTE — PROGRESS NOTES
CLINICAL NUTRITION SERVICES - BRIEF NOTE     Nutrition Prescription    RECOMMENDATIONS FOR MDs/PROVIDERS TO ORDER:  - please note that pt had an order for up to 2 L LR as needed/day with \[facility]\"".  However they were generally sending 7 bags of LR/wk.  He was taking 1 L LR over 2.5 hr via CAD pump (425 ml/hr).  He currently doesn't have maintenance fluids to meet fluid needs with SBS.      - RD added Vitamin D to this morning's labs.  He has been on the high dose vitamin D2 since 2018.  If Vitamin D lab is low, would recommend changing Vitamin D to the CF version of Vitamin D that is water soluble and can be absorbed without oral fat intake.      Recommendations already ordered by Registered Dietitian (RD):  - Collaborate with other providers---[facility] re: above.  - Order Vitamin D lab--not checked since 2019 and on weekly 50,000 international unit(s) since 2018 per \[facility]\"" records.    Future/Additional Recommendations:  - If PPN (non-central line access) required, would recommend: Clinimix E with 4.25%AA and 5% dextrose.  Continue rate of 83 ml/hr (1992 ml/d or ~2L) would provide 100 g dextrose daily (340 kcal, GIR 0.8 mg/kg/min), 85 g AA daily (340 kcal), and 250 ml 20% IV lipids 5-days per week (M-F) for total provision of 1037 kcals (12 kcal/kg/day), 1.0 g PRO/kg/day, and 34% kcals from Fat.   - If pt required electrolyte adjustments would need a custom PPN (peripheral) formulation.   - If pt continues on TPN and notes any issues w/ dehydration (despite addition of bolus fluids), consider adjusting TPN volume to 1250 ml (receives this 4 out of 7 days/wk at home on non-lipid days).     EVALUATION OF THE PROGRESS TOWARD GOALS   Diet: regular  Nutrition Support: Cyclic TPN--1000 ml over 12 hr with 100 g AA, 175 g Dextrose and 250 ml 20% lipids 3x/wk (MWF).  Also receiving standard infuvite and trace minerals here.      NEW FINDINGS   Noted no maintenance fluids ordered.  Contacted \[facility]\"" liaison re: home IVF orders.  I  "liaison referred this writer to call [facility] TPN PhD.  Per [facility] PharmD, pt had order for up to 2 L of IV LR but was being sent 7 liter bags of LR q week.  He was infusing via a CAD pump over 2.5 hr per their records.      Labs reviewed.  Vitamin D was only 19 when last checked 1/2019.  Per [facility] PharmD, pt has received the 50,000 units of Vitamin D2 since 2018.  This is an unusually long period to receive this \"loading dose\" of Vitamin D.  With his SBS and limited intake he would have limited absorption and NO absorption if he didn't take this with oral fat.      Awaiting ID consult re: whether pt will require short term PPN.  Noted with recent admission pt refused to run lipids due to them sometimes making him feel ill.  After RD visit explaining increased need for IV lipids while on PPN to meet a large amount of his kcal needs he said that he was expecting discharge the next day and would resume lipids at home.  Noted pt also using intralipid at home vs the usual MSOF lipids with long term TPN.  No fish allergies noted.       INTERVENTIONS  Collaborate with other providers as above.   Continue current TPN and lipids as ordered.   PPN recommendations noted above.     Monitoring/Evaluation  Progress toward goals will be monitored and evaluated per protocol.     Chasidy Grimm) Maritza RD, LD   6B and 8A Med/surg (M-F) Pager: [phone]  Weekend/holiday pager: [phone]    "

## 2023-08-07 NOTE — PLAN OF CARE
Pt is A&O x4, calm and cooperative. Denies nausea/vomiting, SOB and chest pain. Pain 8/10 managed with prn Oxycodone. VSS on RA. Double lumen Central line dressing intact. Pt refused full skin assessment and still on his casual clothes. Independent. Cont of B&B, last BM 8/5/2023. Pt on TPN running at 114ml/hr. Call lights within the reach. Calls appropriately and able to make the needs known. No acute changes this shift. Continue with the POC.

## 2023-08-07 NOTE — PLAN OF CARE
"\/68 (BP Location: Right arm, Patient Position: Sitting, Cuff Size: Adult Regular)   Pulse 89   Temp 98  F (36.7  C) (Oral)   Resp 18   Ht 1.803 m (5' 11\")   Wt 85 kg (187 lb 6.3 oz)   SpO2 100%   BMI 26.14 kg/m    Pt is alert and oriented x4, able to make needs known on room air. Double lumen Central line dressing intact(one red port, one purple port), pt skin warm moist, pt has hx of weight loss surgery, was once 500 lbs. Abd yeast umbilicus, abd skin loose and wrinkled, cms +BLE, lungs CTA, has hx chronic pain. Pt requested to defer skin assessment under clothes at this time as he likes to go for frequent walks and wanted to get outside while it was still daylight.. Oriented patient to room and carol ann light, pt amb ad vitor with a steady gait, wrist bands in place pt is on high fall risk meds, educated about the importance of safety while ambulating. Cont of B&B, last BM 8/5/2023. Pt on TPN at home, states occasional difficulty swallowing food and liquids. Disposition TBD, cont plan of care.  "

## 2023-08-07 NOTE — CARE PLAN
"  Shift: 1900 - 2300   /68 (BP Location: Right arm, Patient Position: Sitting, Cuff Size: Adult Regular)   Pulse 89   Temp 98  F (36.7  C) (Oral)   Resp 18   Ht 1.803 m (5' 11\")   Wt 85 kg (187 lb 6.3 oz)   SpO2 100%   BMI 26.14 kg/m      Patient is alert and oriented x 4, denies SOB, chest pain, N/T stable on room air. Bed time medication given with PRN oxycodone x 1, pain. TPN running at 57 ml/hr, for 1 hour, 114 ml/hr  for 10 hours and 57 ml/hr  for 1 hour. Pt. Is able to make needs known. No acute change this shift. Continue with POC.  "

## 2023-08-07 NOTE — CONSULTS
[facility] ID SERVICE: NEW CONSULTATION  Patient:  Parker Acevedo, Date of birth 1970, Medical record number [id number]  Date of Admission: 8/6/2023  Date of Visit:  8/7/2023  Requesting Provider: Annel Alvarado         Assessment and Recommendations:   ID Problem List:  Klebsiella pneumoniae bacteremia  ? L basilar pneumonia  H/o recurrent CLABSIs  - Most recently treated for septic thrombophlebitis 2/2 MRSE  H/o Celestino-en-Y procedure c/b short gut and chronic malnutrition, TPN dependent  Allergies to penicillins, TMP-SMX, doxycycline; tolerates slow infusion of vancomycin    Mr. Acevedo presents with a Klebsiella pneumoniae bacteremia in the context of frequent line infections, as well as pneumonia symptoms with a L basilar infiltrate on CXR. I suspect he may have had a community acquired pneumonia with incidental finding of line colonization, though this is difficult to determine definitively. Given his long history of CLABSIs, and the organism, would treat as if he has both processes going on simultaneously. For the concern of CLABSI, tunneled lines infected with gram negative organisms can be salvaged if the bacteremia is low-grade and there is no obvious soft tissue infection or metastatic sites of infection. I would argue he meets these criteria. Would treat with a FQ + gentamicin line locks X 10 days for this indication. For a community acquired pneumonia, levofloxacin would be the most appropriate fluoroquinolone, so recommend he be placed on levofloxacin X 10 days. He would prefer a liquid suspension due to PO absorption issues which is a reasonable concern.     Recommendations:  Recommend levofloxacin 750 mg qday X 10 days (oral suspension). Pt advised to separate from calcium containing products.   Recommend gentamicin line locks for the 10 days he is on antibiotics as well to try to de-colonize the line.   - 2.5 mg/mL + heparin 10 units/mL in a 5 mL syringe.   3. No formal ID follow  up necessary given short course of antibiotic therapy.     Recommendations discussed with primary team and ID pharmacy.     Thank you for this consult. ID will sign off at this time as pt is discharging.    Charity Tejeda MD  Infectious Diseases   Pager [phone]       History of Present Illness:   Parker Acevedo is a pleasant 52 year old gentleman with a past medical history of Celestino-en-y gastric bypass c/b short gut syndrome, now TPN dependent, with recent history of CLABSIs X2 who presented to the hospital on 8/6 due to positive blood cultures. ID is consulted for assistance in antibiotic management.     Please see prior ID consultation and progress notes for full history of prior CLABSIs (dating back to 2019). Briefly, he was admitted in early June of this year to an OSH with a polymicrobial bacteremia (K pneumoniae, K oxytoca, E faecalis). It is not clear that ID was consulted, and from chart review it appears he was treated with vancomycin + ceftriaxone as well as line removal on 6/7/23. The tunneled line he had was removed and a PICC was placed. On 7/3/23, he developed pain and swelling around the PICC line and presented to the ED. Blood cultures grew MRSE, and he was found to have DVTs in the L subclavian vein and R internal jugular vein. ID was consulted at that time. A new tunneled catheter was placed on 7/10/23. TTE and RONEL were negative for valvular lesions. He was treated with antibiotics for 4 weeks given the c/f septic thrombophlebitis. He was initially treated with vancomycin, but switched to daptomycin prior to discharge on 7/11, and completed therapy on 8/3.     He initially presented to the ED on 8/4 with night sweats, fatigue, coughing, and heaviness in the chest. He presented to the ED where he was diagnosed with CAP given a L basilar infiltrate on CXR. He was started on azithromycin + cefdinir and discharged home. Blood cultures were drawn at the visit that later grew Klebsiella pneumoniae (from  his line). He was advised to present back to the ED for treatment. He has been started on cefepime with clinical improvement. Mr. Acevedo states that he feels very much like he had a respiratory infection (cough, chest heaviness, SOB) rather than a line infection like he has had previously. He uses the tunneled line overnight for TPN and denies any issues with it recently, either at the site, or while it was infusing. He already feels significantly improved, and is eager to go home today.            Review of Systems:   Complete 12 point review of systems negative except as noted in HPI.        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    Complication of anesthesia     Difficulty swallowing     Gastric ulcer, unspecified as acute or chronic, without mention of hemorrhage, perforation, or obstruction     Gastro-oesophageal reflux disease     Head injury     Hiatal hernia     Other bladder disorder     Other chronic pain     PONV (postoperative nausea and vomiting)     Severe malnutrition (H)     TPN    Short gut syndrome     Tobacco abuse            Relevant Microbiology:   6/4/23 Blood cultures X2: Klebsiella pneumoniae (2/2); E faecalis (2/2); Klebsiella oxytoca (2/2)             Klebsiella pneumoniae Enterococcus faecalis Klebsiella oxytoca       ELISA ELISA ELISA     Ampicillin >=32 ug/mL Resistant 1 <=2 ug/mL Susceptible >=32 ug/mL Resistant 1     Ampicillin/ Sulbactam 4 ug/mL Susceptible   8 ug/mL Susceptible     Cefepime <=1 ug/mL Susceptible   <=1 ug/mL Susceptible     Ceftazidime <=1 ug/mL Susceptible   <=1 ug/mL Susceptible     Ceftriaxone <=1 ug/mL Susceptible   <=1 ug/mL Susceptible     Ciprofloxacin <=0.25 ug/mL  Susceptible   <=0.25 ug/mL Susceptible     Gentamicin <=1 ug/mL Susceptible   <=1 ug/mL Susceptible     Gentamicin Synergy   Susceptible... Susceptible 2       Levofloxacin <=0.12 ug/mL Susceptible   <=0.12 ug/mL Susceptible     Meropenem <=0.25 ug/mL Susceptible   <=0.25 ug/mL Susceptible     Penicillin   4 ug/mL Susceptible       Piperacillin/Tazobactam <=4 ug/mL Susceptible   <=4 ug/mL Susceptible     Tobramycin <=1 ug/mL Susceptible   <=1 ug/mL Susceptible     Trimethoprim/Sulfamethoxazole <=1/19 ug/mL Susceptible   <=1/19 ug/mL Susceptible     Vancomycin   1 ug/mL Susceptible        7/3/23 Blood cultures X2: S epidermidis (2/2)  Staphylococcus epidermidis       ELISA     Ciprofloxacin <=0.5 ug/mL Susceptible     Clindamycin >=8 ug/mL Resistant     Erythromycin >=8 ug/mL Resistant     Gentamicin <=0.5 ug/mL Susceptible     Levofloxacin <=0.12 ug/mL Susceptible     Oxacillin >=4 ug/mL Resistant 1     Tetracycline <=1 ug/mL Susceptible     Vancomycin 1 ug/mL Susceptible      7/4/23 Blood cultures X2: 1/2 S epidermidis    8/4/23 Blood cultures X2: 1/2 Klebsiella pneumoniae (blood from line)  Klebsiella pneumoniae       ELISA     Ampicillin >=32 ug/mL Resistant 1     Ampicillin/ Sulbactam 4 ug/mL Susceptible     Cefepime <=1 ug/mL Susceptible     Ceftazidime <=1 ug/mL Susceptible     Ceftriaxone <=1 ug/mL Susceptible     Ciprofloxacin <=0.25 ug/mL Susceptible     Gentamicin <=1 ug/mL Susceptible     Levofloxacin 0.5 ug/mL Susceptible     Meropenem <=0.25 ug/mL Susceptible     Piperacillin/Tazobactam <=4 ug/mL Susceptible     Tobramycin <=1 ug/mL Susceptible     Trimethoprim/Sulfamethoxazole <=1/19 ug/mL Susceptible      8/6/23 Blood cultures X2: NGTD  8/7/23 Blood culture X1: NGTD         Recent Antimicrobials:   Cefepime 8/6 - present     Vancomycin 7/5 - 7/10  Daptomycin 7/11 - 8/3      Allergies:      Allergies   Allergen Reactions    Bactrim [Sulfamethoxazole-Trimethoprim] Rash    Penicillins Anaphylaxis     Please  see Antimicrobial Management Team allergy assessment note 10/10/2018. Patient reported tolerating amoxicillin.  Tolerating cefepime and ceftriaxone without reaction 6/23    Ertapenem Nausea and Vomiting    Doxycycline Rash    Vancomycin Rash     Rash after receiving vancomycin 3/28/16 (infusion reaction?). Tolerated with slower infusion and diphenhydramine premed.  Tolerated 1250mg over 90minutes 7/2023.          Family History:   Reviewed and noncontributory.   Family History   Problem Relation Age of Onset    Gastrointestinal Disease Mother         Crohns disease    Anxiety Disorder Mother     Thyroid Disease Mother         Grave's disease    Cancer Father         ear cancer-skin cancer/melanoma    Breast Cancer Maternal Grandmother     Macular Degeneration Maternal Grandfather     Anxiety Disorder Sister     Diabetes Maternal Uncle     Breast Cancer Other     Hypertension No family hx of     Hyperlipidemia No family hx of     Cerebrovascular Disease No family hx of     Prostate Cancer No family hx of     Depression No family hx of     Anesthesia Reaction No family hx of     Asthma No family hx of     Osteoporosis No family hx of     Genetic Disorder No family hx of     Obesity No family hx of     Mental Illness No family hx of     Substance Abuse No family hx of     Glaucoma No family hx of           Social History:     Social History     Socioeconomic History    Marital status:      Spouse name: Rose    Number of children: 1    Years of education: Not on file    Highest education level: GED or equivalent   Occupational History    Not on file   Tobacco Use    Smoking status: Light Smoker     Packs/day: 0.50     Years: 3.00     Pack years: 1.50     Types: Cigarettes    Smokeless tobacco: Never    Tobacco comments:     2/4/2021    smokes 3 cigarettes/day   Vaping Use    Vaping Use: Never used   Substance and Sexual Activity    Alcohol use: No     Comment: quit 2002    Drug use: No    Sexual activity: Yes      Partners: Female     Birth control/protection: None     Comment: no protection   Other Topics Concern    Parent/sibling w/ CABG, MI or angioplasty before 65F 55M? No   Social History Narrative    Not on file     Social Determinants of Health     Financial Resource Strain: Medium Risk (5/20/2022)    Overall Financial Resource Strain (CARDIA)     Difficulty of Paying Living Expenses: Somewhat hard   Food Insecurity: No Food Insecurity (5/20/2022)    Hunger Vital Sign     Worried About Running Out of Food in the Last Year: Never true     Ran Out of Food in the Last Year: Never true   Transportation Needs: No Transportation Needs (5/20/2022)    PRAPARE - Transportation     Lack of Transportation (Medical): No     Lack of Transportation (Non-Medical): No   Physical Activity: Unknown (8/4/2020)    Exercise Vital Sign     Days of Exercise per Week: 2 days     Minutes of Exercise per Session: Not on file   Stress: No Stress Concern Present (8/4/2020)    [employer] [place] [employer] - Occupational Stress Questionnaire     Feeling of Stress : Only a little   Social Connections: Socially Isolated (8/4/2020)    Social Connection and Isolation Panel [NHANES]     Frequency of Communication with Friends and Family: Once a week     Frequency of Social Gatherings with Friends and Family: Never     Attends [employer] Services: Never     Active Member of Clubs or Organizations: No     Attends Club or Organization Meetings: Never     Marital Status:    Intimate Partner Violence: Not At Risk (8/4/2020)    Humiliation, Afraid, Rape, and Kick questionnaire     Fear of Current or Ex-Partner: No     Emotionally Abused: No     Physically Abused: No     Sexually Abused: No   Housing Stability: Low Risk  (8/4/2020)    Housing Stability Vital Sign     Unable to Pay for Housing in the Last Year: No     Number of Places Lived in the Last Year: 1     Unstable Housing in the Last Year: No          Physical Exam:   /82  "(BP Location: Right arm)   Pulse 60   Temp 98.1  F (36.7  C) (Oral)   Resp 18   Ht 1.803 m (5' 11\")   Wt 85 kg (187 lb 6.3 oz)   SpO2 100%   BMI 26.14 kg/m     Exam:  GENERAL:  Well-developed, thin, sitting in bed in no acute distress.   ENT:  Head is normocephalic, atraumatic. Oropharynx is moist without exudates or ulcers.  NECK:  Supple.  LUNGS:  Clear to auscultation b/l. Normal WOB on RA.   CARDIOVASCULAR:  Regular rate and rhythm with no murmurs, gallops or rubs.  L tunneled line in place w/ no purulence, erythema, or edema at insertion site. No TTP along line.   ABDOMEN:  Normal bowel sounds, soft, nontender. G tube in place.   EXT: Extremities warm and without edema.  SKIN:  No acute rashes.    NEUROLOGIC:  Grossly nonfocal.         Laboratory Data:   Metabolic Studies       Recent Labs   Lab Test 08/07/23  0643 08/07/23  0149 08/06/23  0818 08/06/23  0633 08/04/23  0337 08/01/23  1225 07/25/23  1420 07/18/23  1230   NA  --   --   --  141 139 142 140 142   POTASSIUM  --   --   --  3.5 3.4 3.5 3.0* 4.0   CHLORIDE  --   --   --  106 103 106 106 106   CO2  --   --   --  25 26 26 22 26   ANIONGAP  --   --   --  10 10 10 12 10   BUN  --   --   --  9.2 9.8 18.7 7.9 22.4*   CR  --   --   --  0.71 0.76 0.73 0.69 0.73   GFRESTIMATED  --   --   --  >90 >90 >90 >90 >90   *   < >  --  100* 103* 99 106* 95   SILAS  --   --   --  8.7 8.3* 8.2* 8.2* 8.6   PHOS  --   --   --   --   --  3.2 3.0 3.7   MAG  --   --   --   --   --  2.0 2.1 1.9   LACT  --   --  0.6*  --  0.8  --   --   --    PCAL  --   --   --  5.26* 2.49*  --   --   --    CKT  --   --   --   --   --   --  62 141    < > = values in this interval not displayed.       Hepatic Studies    Recent Labs   Lab Test 08/06/23  0633 08/04/23  0337 08/01/23  1225 07/08/22  0459 07/07/22  1231   BILITOTAL 0.2 0.3 0.3   < > 0.2   DBIL  --   --  <0.20   < >  --    ALKPHOS 125 187* 60   < > 52   PROTTOTAL 6.7 6.5 6.3*   < > 5.7*   ALBUMIN 3.7 3.6 3.8   < > 3.5   AST " 24 179* 23   < > 33   ALT 89* 211* 19   < > 34   LDH  --   --   --   --  214    < > = values in this interval not displayed.       Hematology Studies      Recent Labs   Lab Test 08/06/23  0633 08/04/23  0337 08/01/23  1225 07/25/23  1420 07/18/23  1230 07/11/23  0336 07/13/21  1110 06/29/21  1016 06/14/21  1017   WBC 6.8 9.5 8.2 8.1 9.2 6.7   < > 6.9 8.1   ANEU  --   --   --   --   --   --   --  3.1 4.1   ALYM  --   --   --   --   --   --   --  2.7 2.5   DACIA  --   --   --   --   --   --   --  0.9 1.2   AEOS  --   --   --   --   --   --   --  0.2 0.2   HGB 10.6* 11.0* 10.0* 11.3* 10.4* 10.0*   < > 12.1* 12.0*   HCT 34.3* 34.6* 32.1* 35.9* 33.1* 32.8*   < > 37.4* 37.0*    155 214 228 244 217   < > 178 158    < > = values in this interval not displayed.     Imaging:  Recent Results (from the past 48 hour(s))   XR Chest 2 Views    Narrative    Examination: XR CHEST 2 VIEWS 8/6/2023 8:27 AM    Indication: pneumonia bacteremia    Comparison: X-ray 8/4/2023    Findings:  PA and lateral views of the chest. Trachea is midline. Cardiac  silhouette and pulmonary vasculature are within normal limits. No  significant pleural effusion or discernible pneumothorax. Left  dual-lumen IVC terminates over the mid SVC. Improved left basilar  opacities. Unremarkable visualized upper abdomen. No acute osseous  abnormality.      Impression    Impression:   Improved left basilar opacity. No new opacities identified.    I have personally reviewed the examination and initial interpretation  and I agree with the findings.    TIANNA FARMER MD         SYSTEM ID:  [id number]

## 2023-08-07 NOTE — DISCHARGE SUMMARY
"[facility]  Discharge Summary - Medicine & Pediatrics       Date of Admission:  8/6/2023  Date of Discharge:  8/7/2023  Discharging Provider: Dr. Annel Alvarado  Discharge Service: [facility] Medicine Service    Discharge Diagnoses   Klebsiella bacteremia with indwelling Cm line  Left basilar pneumonia  Hx recurrent CLABSI  Short gut syndrome on chronic TPN  Chronic pain  Catheter-associated septic subclavian DVT  Anemia  Anxiety  ADHD  Paroxysmal AFib    Clinically Significant Risk Factors     # Overweight: Estimated body mass index is 26.14 kg/m  as calculated from the following:    Height as of this encounter: 1.803 m (5' 11\").    Weight as of this encounter: 85 kg (187 lb 6.3 oz).       Follow-ups Needed After Discharge   Follow-up Appointments     Adult [facility] [place]/[facility] Follow-up and recommended labs and tests      Follow up with primary care provider, Chuy Isaac, within 7-14 days   for hospital follow- up and follow up regarding bacteremia treatment.     Appointments on [place] and/or [facility] (with [facility] [place] or [facility]   provider or service). Call [phone] if you haven't heard regarding   these appointments within 7 days of discharge.        Follow up 8/6 and 8/7 blood cultures.    Unresulted Labs Ordered in the Past 30 Days of this Admission       Date and Time Order Name Status Description    8/7/2023 12:49 PM Vitamin D In process     8/7/2023  8:10 AM Blood Culture Line, venous In process     8/7/2023  8:09 AM Blood Culture Arm, Right In process     8/6/2023  6:25 AM Blood Culture Line, venous Preliminary     8/6/2023  6:25 AM Blood Culture Peripheral Blood Preliminary     8/4/2023  3:00 AM Blood Culture Line, venous Preliminary         These results will be followed up by PCP.    Discharge Disposition   Discharged to home  Condition at discharge: Stable    Hospital Course   Parker Acevedo is a 52 year old male with history of short gut " syndrome and severe malnutrition on chronic TPN with recent Cm placement, dysphagia, anxiety, recurrent CLABSI, recent admission 7/4-7/11 for MRSE bacteremia (s/p 4 weeks daptomycin, stopped on 8/2), LUE non-purulent cellulitis and catheter-associated septic subclavian DVT (on Lovenox) who presented to the ED on 8/4, diagnosed with L basilar pneumonia, sent with azithromycin, then called back for 8/4 blood cultures positive for Klebsiella pneumoniae bacteremia. Admitted for IV antibiotics and assessment by infectious disease.     #Klebsiella pneumoniae bacteremia  #Hx recurrent CLABSI  #Chronic TPN  #Indwelling Cm line  #Left basilar pneumonia  On admission, procal elevated to 5.26. Lactic of 0.6, no leukocytosis. Vitally and hemodynamically stable, afebrile and patient stated he does not feel like he did when he had MRSE bacteremia in July. Despite vitals and labs wnl, very high suspicion true bacteremia given he has previously had Klebsiella bacteremia in June 2023 and has possible sources being indwelling Cm line (most likely given both positive cultures from 8/4 were taken from his line) vs left basilar pneumonia (has risk of hospital-acquired PNA given recent hospitalizations). 8/4 blood culture sensitivities show Klebsiella is resistant to ampicillin, but is otherwise pan-susceptible - he was started on IV Cefepime on 8/6.    Diagnosed with PNA in outside ED on 8/4, prescribed azithromycin and cefdinir. S/p only one dose of each (given one more dose of azithromycin here on 8/7 but then discontinued). Initially had Tmax 101.7 on 8/3, fatigue, night sweats, but on admission was asymptomatic with normal respiratory exam and oxygen saturation in mid 90s on room air. Repeat CXR on 8/6 shows improved left basilar opacity compared to 8/4.     ID was consulted and saw him on 8/7; suspect that he has CLABSI and concurrent community acquired pneumonia. ID shared favored plan with patient which included  staying inpatient for coordination of removal of his Cm, then 3 days after removal placing a new line. However, patient strongly preferred to go home, so plan was made to switch him to Levaquin 750mg x 10 days (starting 8/7) to treat for CAP and gentamicin line locks x10 days to try to de-colonize his line (per ID, tunneled lines infected with gram negative organisms can be salvaged if the bacteremia is low-grade and there is no obvious soft tissue infection or metastatic sites of infection, and ID feels he meets this criteria).    - Discontinued PTA cefdinir and azithromycin  - 10 days 750mg q24h PO liquid Levaquin (separate from calcium containing products) and gentamicin line locks  - No ID follow up necessary given short course of antibiotic therapy  - Follow up in 1-2 weeks with PCP for post-hospitalization visit     #Short gut syndrome  #TPN dependent  #Severe malnutrition  #J tube  #Reflux  Has been on chronic TPN for 8-9 years due to severe malnutrition and short gut syndrome, which resulted from a Celestino-en-Y complication. Has J tube for venting purposes. Does take all medications orally and has PO intake, though he frequently has nausea/vomiting and dumping. Pharmacy/nutrition consulted to start and manage TPN while admitted. Continued home regimen (carafate, miralax, senna, zofran)     #Chronic pain  Mostly abdominal pain, secondary to above problem.  - PTA Oxycodone 5-10mg q4h prn, fentanyl patch q3d, lidocaine patch, tylenol prn     #Catheter-associated septic subclavian DVT  - Continued Lovenox 80mg BID     #Anemia  Baseline over past couple years appears to be around 11-12, likely in setting of chronic disease. Hgb on admission at 10.6 and remained stable.     #Anxiety: PTA lorazepam at bedtime  #ADHD: PTA Adderall  #Paroxysmal AFib: PTA carvedilol    Consultations This Hospital Stay   NUTRITION SERVICES ADULT IP CONSULT  PHARMACY/NUTRITION TO START AND MANAGE TPN  PHARMACY IP CONSULT  INFECTIOUS  DISEASE [facility] ADULT IP CONSULT  CARE MANAGEMENT / SOCIAL WORK IP CONSULT    Code Status   Full Code       The patient was discussed with Dr. Alvarado.    Gil Rios MD  [facility]  [facility] [address]  [address] [address]  Phone: [phone]  Fax: [phone]  ______________________________________________________________________    Physical Exam   Vital Signs: Temp: 98.1  F (36.7  C) Temp src: Oral BP: 121/87 Pulse: 90   Resp: 16 SpO2: 99 % O2 Device: None (Room air)    Weight: 187 lbs 6.26 oz  Constitutional: awake, alert, cooperative, no apparent distress, very energetic and well appearing  Eyes: Lids and lashes normal, pupils equal and round, sclera clear, conjunctiva normal  ENT: Normocephalic, without obvious abnormality, atraumatic, oral pharynx with moist mucous membranes, tonsils without erythema or exudates, gums normal, teeth missing.  Respiratory: No increased work of breathing, good air exchange, clear to auscultation bilaterally, no crackles or wheezing  Cardiovascular: Regular rate and rhythm, normal S1 and S2, and no murmur noted  GI: Soft, non-distended, non-tender, J tube with baseline surrounding pink skin but otherwise no erythema or discharge around J tube site. No masses palpated  Skin: no bruising or bleeding, normal skin color, texture, turgor, no redness, warmth, or swelling, and no rashes  Musculoskeletal: no lower extremity pitting edema present  there is no redness, warmth, or swelling of the joints  Neurologic: Awake, alert, oriented to name, place and time.   Neuropsychiatric: General: normal, calm, and normal eye contact  Level of consciousness: alert / normal  Affect: normal and pleasant  Orientation: oriented to self, place, time and situation  Memory and insight: normal, memory for past and recent events intact, and thought process normal      Primary Care Physician   Chuy Isaac    Discharge Orders      Home Infusion Referral       Resume Home Care Services    [facility]: Please resume prior to admission services for central line care, LR Hydration, and TPN.     Resume Home Care Services    [facility]: Please resume prior to admission services.     Reason for your hospital stay    Klebsiella bacteremia     Activity    Your activity upon discharge: activity as tolerated     Adult [facility] Follow-up and recommended labs and tests    Follow up with primary care provider, Chuy Isaac, within 7-14 days for hospital follow- up and follow up regarding bacteremia treatment.     Appointments on [place] and/or [facility] (with [facility] or [facility] provider or service). Call [phone] if you haven't heard regarding these appointments within 7 days of discharge.     Diet    Follow this diet upon discharge: TPN, Regular Diet Adult       Significant Results and Procedures   Most Recent 3 CBC's:  Recent Labs   Lab Test 08/07/23 0817 08/06/23 0633 08/04/23  0337   WBC 6.1 6.8 9.5   HGB 11.2* 10.6* 11.0*   MCV 94 95 94    160 155     Most Recent 3 BMP's:  Recent Labs   Lab Test 08/07/23  1432 08/07/23  0817 08/07/23  0643 08/07/23  0149 08/06/23 0633 08/04/23  0337   NA  --  142  --   --  141 139   POTASSIUM  --  4.2  --   --  3.5 3.4   CHLORIDE  --  107  --   --  106 103   CO2  --  28  --   --  25 26   BUN  --  13.5  --   --  9.2 9.8   CR  --  0.59*  --   --  0.71 0.76   ANIONGAP  --  7  --   --  10 10   SILAS  --  8.7  --   --  8.7 8.3*   * 85 127*   < > 100* 103*    < > = values in this interval not displayed.     Most Recent 2 LFT's:  Recent Labs   Lab Test 08/07/23 0817 08/06/23 0633   AST 17 24   ALT 68 89*   ALKPHOS 115 125   BILITOTAL 0.3 0.2     7-Day Micro Results       Collected Updated Procedure Result Status      08/07/2023 1411 08/07/2023 1427 Blood Culture Line, venous [[id number]]   Blood from Line, venous    In process Component Value   No component results               08/07/2023 0817  08/07/2023 0905 Blood Culture Arm, Right [[id number]]   Blood from Arm, Right    In process Component Value   No component results               08/06/2023 0633 08/07/2023 1004 Blood Culture Peripheral Blood [[id number]]   Peripheral Blood    Preliminary result Component Value   Culture No growth after 1 day  [P]                08/06/2023 0633 08/07/2023 1004 Blood Culture Line, venous [[id number]]   Blood from Line, venous    Preliminary result Component Value   Culture No growth after 1 day  [P]                08/04/2023 0427 08/04/2023 0509 Symptomatic Influenza A/B, RSV, & SARS-CoV2 PCR (COVID-19) Nose [[id number]]    Swab from Nose    Final result Component Value   Influenza A PCR Negative   Influenza B PCR Negative   RSV PCR Negative   SARS CoV2 PCR Negative   NEGATIVE: SARS-CoV-2 (COVID-19) RNA not detected, presumed negative.            08/04/2023 0346 08/07/2023 0809 Blood Culture Line, venous [[id number]]    (Abnormal)   Blood from Line, venous    Final result Component Value   Culture Positive on the 1st day of incubation    Klebsiella pneumoniae    2 of 2 bottles        Susceptibility        Klebsiella pneumoniae      ELISA      Ampicillin >=32 ug/mL Resistant  [1]       Ampicillin/ Sulbactam 4 ug/mL Susceptible      Cefepime <=1 ug/mL Susceptible      Ceftazidime <=1 ug/mL Susceptible      Ceftriaxone <=1 ug/mL Susceptible      Ciprofloxacin <=0.25 ug/mL Susceptible      Gentamicin <=1 ug/mL Susceptible      Levofloxacin 0.5 ug/mL Susceptible      Meropenem <=0.25 ug/mL Susceptible      Piperacillin/Tazobactam <=4 ug/mL Susceptible      Tobramycin <=1 ug/mL Susceptible      Trimethoprim/Sulfamethoxazole <=1/19 ug/mL Susceptible                   [1]  Intrinsically Resistant                   08/04/2023 0346 08/04/2023 1826 Verigene GN Panel [[id number]]    (Abnormal)   Blood from Line, venous    Final result Component Value   Acinetobacter species Not Detected   Citrobacter species Not  Detected   Enterobacter species Not Detected   Proteus species Not Detected   Escherichia coli Not Detected   Klebsiella pneumoniae Detected   Positive for Klebsiella pneumoniae by Songforigene multiplex nucleic acid test. Final identification and antimicrobial susceptibility testing will be verified by standard methods.   Klebsiella oxytoca Not Detected   Pseudomonas aeruginosa Not Detected   CTX-M Not Detected   KPC Not Detected   NDM Not Detected   VIM Not Detected   IMP Not Detected   OXA Not Detected            08/04/2023 0337 08/07/2023 0931 Blood Culture Line, venous [[id number]]   Blood from Line, venous    Preliminary result Component Value   Culture No growth after 3 days  [P]                    ,   Results for orders placed or performed during the hospital encounter of 08/06/23   XR Chest 2 Views    Narrative    Examination: XR CHEST 2 VIEWS 8/6/2023 8:27 AM    Indication: pneumonia bacteremia    Comparison: X-ray 8/4/2023    Findings:  PA and lateral views of the chest. Trachea is midline. Cardiac  silhouette and pulmonary vasculature are within normal limits. No  significant pleural effusion or discernible pneumothorax. Left  dual-lumen IVC terminates over the mid SVC. Improved left basilar  opacities. Unremarkable visualized upper abdomen. No acute osseous  abnormality.      Impression    Impression:   Improved left basilar opacity. No new opacities identified.    I have personally reviewed the examination and initial interpretation  and I agree with the findings.    TIANNA FARMER MD         SYSTEM ID:  [id number]     *Note: Due to a large number of results and/or encounters for the requested time period, some results have not been displayed. A complete set of results can be found in Results Review.       Discharge Medications   Current Discharge Medication List        START taking these medications    Details   levofloxacin (LEVAQUIN) 25 MG/ML solution Take 30 mLs (750 mg) by mouth daily for 10  days  Qty: 300 mL, Refills: 0    Associated Diagnoses: Gram-negative bacteremia; Bloodstream infection associated with central venous catheter, initial encounter      !! sodium chloride 4.64 mL with gentamicin 12.4 mg, heparin (porcine) 50 Units for Dialysis Catheter Care gentamicin (GARAMYCIN) 2.5 mg/mL, heparin (porcine) 10 Units/mL in sodium chloride 0.9 % 5 mL Antimicrobial Catheter Lock Therapy    Dwell in lumen #1 when TPN is not running.  Qty: 1 each, Refills: 0    Associated Diagnoses: Gram-negative bacteremia; Bloodstream infection associated with central venous catheter, initial encounter      !! sodium chloride 4.64 mL with gentamicin 12.4 mg, heparin (porcine) 50 Units for Dialysis Catheter Care gentamicin (GARAMYCIN) 2.5 mg/mL, heparin (porcine) 10 Units/mL in sodium chloride 0.9 % 5 mL Antimicrobial Catheter Lock Therapy    Dwell in lumen #2 when TPN is not running.  Qty: 1 each, Refills: 0    Associated Diagnoses: Gram-negative bacteremia; Bloodstream infection associated with central venous catheter, initial encounter       !! - Potential duplicate medications found. Please discuss with provider.        CONTINUE these medications which have NOT CHANGED    Details   albuterol (VENTOLIN HFA) 108 (90 Base) MCG/ACT inhaler Inhale 2 puffs into the lungs every 6 hours as needed  Qty: 18 g, Refills: 1    Comments: Pharmacy may dispense brand covered by insurance (Proair, or proventil or ventolin or generic albuterol inhaler)  Associated Diagnoses: SOB (shortness of breath)      amphetamine-dextroamphetamine (ADDERALL) 20 MG tablet TAKE ONE TABLET BY MOUTH ONCE DAILY  Qty: 30 tablet, Refills: 0    Associated Diagnoses: ADHD (attention deficit hyperactivity disorder), inattentive type      azithromycin (ZITHROMAX) 200 MG/5ML suspension Take 6.25 mLs (250 mg) by mouth daily for 4 days  Qty: 25 mL, Refills: 0      carvedilol (COREG) 6.25 MG tablet TAKE 2 TABLETS (12.5 MG) BY MOUTH 2 TIMES DAILY WITH MEALS  Qty:  60 tablet, Refills: 3    Associated Diagnoses: Abnormal echocardiogram      enoxaparin ANTICOAGULANT (LOVENOX) 80 MG/0.8ML syringe INJECT THE CONTENTS OF ONE SYRINGE (80MG) UNDER THE SKIN TWO TIMES A DAY  Qty: 67.2 mL, Refills: 0    Associated Diagnoses: VTE (venous thromboembolism)      lidocaine (LIDODERM) 5 % patch Place 1-2 patches onto the skin every 24 hours Wear for 12 hours, remove for 12 hours.  OK to cut to better fit to size.  Qty: 60 patch, Refills: 11    Associated Diagnoses: Chronic abdominal pain; Chronic pain syndrome; Chronic bilateral low back pain without sciatica; Myofascial pain      LORazepam (ATIVAN) 1 MG tablet TAKE ONE TABLET BY MOUTH ONCE DAILY AS NEEDED FOR ANXIETY WITH TPN AND MEDICATIONS  Qty: 30 tablet, Refills: 0    Associated Diagnoses: ADHD (attention deficit hyperactivity disorder), inattentive type      nystatin (MYCOSTATIN) 493052 UNIT/GM external cream APPLY TOPICALLY 2 TIMES DAILY  Qty: 30 g, Refills: 1    Associated Diagnoses: Yeast infection of the skin      ondansetron (ZOFRAN ODT) 8 MG ODT tab DISSOLVE ONE TABLET ON TONGUE EVERY 8 HOURS AS NEEDED FOR NAUSEA  Qty: 90 tablet, Refills: 1    Associated Diagnoses: Nausea      oxyCODONE (ROXICODONE) 5 MG/5ML solution Take 5-10 mLs (5-10 mg) by mouth every 4 hours as needed for moderate to severe pain Max of 40mg/day. Fill 07/30/23 and start 08/01/23. 30 day supply for chronic pain.  Qty: 1200 mL, Refills: 0    Associated Diagnoses: Cervicalgia; Chronic pain syndrome; Visceral hyperalgesia; Chronic abdominal pain; Chronic, continuous use of opioids; Chronic bilateral low back pain without sciatica      polyethylene glycol (MIRALAX) 17 GM/Dose powder Take 17 g by mouth daily  Qty: 1020 g, Refills: 3    Associated Diagnoses: Chronic, continuous use of opioids      sucralfate (CARAFATE) 1 GM/10ML suspension TAKE 10MLS  BY MOUTH FOUR TIMES A DAY AS NEEDED  Qty: 420 mL, Refills: 1    Associated Diagnoses: Gastric bypass status for  "obesity      cyanocobalamin (CYANOCOBALAMIN) 1000 MCG/ML injection INJECT 1 ML INTO THE MUSCLE EVERY 30 DAYS  Qty: 1 mL, Refills: 3    Associated Diagnoses: Vitamin B12 deficiency (non anemic)      [facility] INFUSION MANAGED PATIENT Contact [facility] for patient specific medication information at [phone] on admission and discharge from the hospital.  Phones are answered 24 hours a day 7 days a week 365 days a year.    Providers - Choose \"CONTINUE HOME MED (no script)\" at discharge if patient treatment with home infusion will continue.    Comments: Resume prior to admission TPN/Lipids/saline  Associated Diagnoses: S/P bariatric surgery      fentaNYL (DURAGESIC) 25 mcg/hr 72 hr patch Place 1 patch onto the skin every 48 hours Fill 07/30/23 and start 08/01/23. 30 day supply for chronic pain.  Qty: 15 patch, Refills: 0    Associated Diagnoses: Cervicalgia; Chronic pain syndrome; Visceral hyperalgesia; Chronic abdominal pain; Chronic, continuous use of opioids; Chronic bilateral low back pain without sciatica      naloxone (NARCAN) 4 MG/0.1ML nasal spray Spray 1 spray (4 mg) into one nostril alternating nostrils once as needed for opioid reversal every 2-3 minutes until assistance arrives  Qty: 0.2 mL, Refills: 0    Associated Diagnoses: Long term (current) use of opiate analgesic      Syringe/Needle, Disp, (B-D ECLIPSE SYRINGE) 27G X 1/2\" 1 ML MISC 1 Device every 30 days  Qty: 30 each, Refills: 1    Associated Diagnoses: Vitamin B12 deficiency (non anemic)      vitamin D2 (ERGOCALCIFEROL) 03898 units (1250 mcg) capsule Take 1 capsule (50,000 Units) by mouth once a week  Qty: 12 capsule, Refills: 3    Associated Diagnoses: Vitamin D deficiency           STOP taking these medications       cefdinir (OMNICEF) 250 MG/5ML suspension Comments:   Reason for Stopping:             Allergies   Allergies   Allergen Reactions    Bactrim [Sulfamethoxazole-Trimethoprim] Rash    Penicillins Anaphylaxis     " Please see Antimicrobial Management Team allergy assessment note 10/10/2018. Patient reported tolerating amoxicillin.  Tolerating cefepime and ceftriaxone without reaction 6/23    Ertapenem Nausea and Vomiting    Doxycycline Rash    Vancomycin Rash     Rash after receiving vancomycin 3/28/16 (infusion reaction?). Tolerated with slower infusion and diphenhydramine premed.  Tolerated 1250mg over 90minutes 7/2023.

## 2023-08-08 ENCOUNTER — MEDICAL CORRESPONDENCE (OUTPATIENT)
Dept: HEALTH INFORMATION MANAGEMENT | Facility: CLINIC | Age: 53
End: 2023-08-08

## 2023-08-08 ENCOUNTER — LAB REQUISITION (OUTPATIENT)
Dept: LAB | Facility: CLINIC | Age: 53
End: 2023-08-08
Payer: COMMERCIAL

## 2023-08-08 ENCOUNTER — PATIENT OUTREACH (OUTPATIENT)
Dept: CARE COORDINATION | Facility: CLINIC | Age: 53
End: 2023-08-08

## 2023-08-08 DIAGNOSIS — R13.10 DYSPHAGIA, UNSPECIFIED: ICD-10-CM

## 2023-08-08 LAB
ALBUMIN SERPL BCG-MCNC: 3.5 G/DL (ref 3.5–5.2)
ALP SERPL-CCNC: 106 U/L (ref 40–129)
ALT SERPL W P-5'-P-CCNC: 50 U/L (ref 0–70)
ANION GAP SERPL CALCULATED.3IONS-SCNC: 8 MMOL/L (ref 7–15)
AST SERPL W P-5'-P-CCNC: 16 U/L (ref 0–45)
BASOPHILS # BLD AUTO: 0 10E3/UL (ref 0–0.2)
BASOPHILS NFR BLD AUTO: 1 %
BILIRUB DIRECT SERPL-MCNC: <0.2 MG/DL (ref 0–0.3)
BILIRUB SERPL-MCNC: 0.3 MG/DL
BUN SERPL-MCNC: 11.5 MG/DL (ref 6–20)
CALCIUM SERPL-MCNC: 8.4 MG/DL (ref 8.6–10)
CHLORIDE SERPL-SCNC: 106 MMOL/L (ref 98–107)
CREAT SERPL-MCNC: 0.8 MG/DL (ref 0.67–1.17)
DEPRECATED CALCIDIOL+CALCIFEROL SERPL-MC: 24 UG/L (ref 20–75)
DEPRECATED HCO3 PLAS-SCNC: 26 MMOL/L (ref 22–29)
EOSINOPHIL # BLD AUTO: 0.3 10E3/UL (ref 0–0.7)
EOSINOPHIL NFR BLD AUTO: 6 %
ERYTHROCYTE [DISTWIDTH] IN BLOOD BY AUTOMATED COUNT: 15.8 % (ref 10–15)
FASTING STATUS PATIENT QL REPORTED: NO
GFR SERPL CREATININE-BSD FRML MDRD: >90 ML/MIN/1.73M2
GLUCOSE SERPL-MCNC: 81 MG/DL (ref 70–99)
HCT VFR BLD AUTO: 34.2 % (ref 40–53)
HGB BLD-MCNC: 10.6 G/DL (ref 13.3–17.7)
HOLD SPECIMEN: NORMAL
IMM GRANULOCYTES # BLD: 0 10E3/UL
IMM GRANULOCYTES NFR BLD: 0 %
LYMPHOCYTES # BLD AUTO: 1.4 10E3/UL (ref 0.8–5.3)
LYMPHOCYTES NFR BLD AUTO: 30 %
MAGNESIUM SERPL-MCNC: 1.9 MG/DL (ref 1.7–2.3)
MCH RBC QN AUTO: 29 PG (ref 26.5–33)
MCHC RBC AUTO-ENTMCNC: 31 G/DL (ref 31.5–36.5)
MCV RBC AUTO: 94 FL (ref 78–100)
MONOCYTES # BLD AUTO: 0.6 10E3/UL (ref 0–1.3)
MONOCYTES NFR BLD AUTO: 13 %
NEUTROPHILS # BLD AUTO: 2.3 10E3/UL (ref 1.6–8.3)
NEUTROPHILS NFR BLD AUTO: 50 %
NRBC # BLD AUTO: 0 10E3/UL
NRBC BLD AUTO-RTO: 0 /100
PHOSPHATE SERPL-MCNC: 2.8 MG/DL (ref 2.5–4.5)
PLATELET # BLD AUTO: 164 10E3/UL (ref 150–450)
POTASSIUM SERPL-SCNC: 3.8 MMOL/L (ref 3.4–5.3)
PROT SERPL-MCNC: 6.5 G/DL (ref 6.4–8.3)
RBC # BLD AUTO: 3.65 10E6/UL (ref 4.4–5.9)
SODIUM SERPL-SCNC: 140 MMOL/L (ref 136–145)
TRIGL SERPL-MCNC: 63 MG/DL
WBC # BLD AUTO: 4.7 10E3/UL (ref 4–11)

## 2023-08-08 PROCEDURE — 80053 COMPREHEN METABOLIC PANEL: CPT | Performed by: SURGERY

## 2023-08-08 PROCEDURE — 84100 ASSAY OF PHOSPHORUS: CPT | Performed by: SURGERY

## 2023-08-08 PROCEDURE — 84478 ASSAY OF TRIGLYCERIDES: CPT | Performed by: SURGERY

## 2023-08-08 PROCEDURE — 36592 COLLECT BLOOD FROM PICC: CPT | Performed by: SURGERY

## 2023-08-08 PROCEDURE — 85025 COMPLETE CBC W/AUTO DIFF WBC: CPT | Performed by: SURGERY

## 2023-08-08 PROCEDURE — 83735 ASSAY OF MAGNESIUM: CPT | Performed by: SURGERY

## 2023-08-08 PROCEDURE — 82248 BILIRUBIN DIRECT: CPT | Performed by: SURGERY

## 2023-08-08 NOTE — LETTER
[facility] CARE COORDINATION  [address] [address]  Phone: [phone]      August 10, 2023      Parker Acevedo  [address] [place] [id number]    Dear Parker,    We have been trying to reach you.  The goal of care coordination is to help you manage your health and improve access to the [facility] system in the most efficient manner.  The Care Coordinator is a nurse who understands the healthcare system and will assist you in improving your access to care.     As your Physician and Care Coordinator we partner to help you achieve your health care goals.     We will continue to reach out; however, if you are able to call your Care Coordinator at [phone], that would be appreciated.  We at [facility] are focused on providing you with the highest-quality healthcare experience possible.      It is a pleasure to partner with you as we work towards achieving your optimal state of wellness.        Sincerely,    CHRISTY Abdul Paul D  [facility]   [address] [facility] [address]

## 2023-08-09 LAB — BACTERIA BLD CULT: NO GROWTH

## 2023-08-09 ASSESSMENT — ENCOUNTER SYMPTOMS
MYALGIAS: 1
NAUSEA: 1
FEVER: 1
CHILLS: 1

## 2023-08-09 NOTE — PROGRESS NOTES
Clinic Care Coordination Contact  [facility]/Voicemail       Clinical Data: Care Coordinator Outreach  Outreach attempted x 1.  Left message on patient's voicemail with call back information and requested return call.  Plan: Care Coordinator sent care coordination introduction letter on 5/20/2022 via [employer]. Care Coordinator will try to reach patient again in 1-2 business days.    Kinsey Muhammad RN Care Coordination   [facility]  Email: [email or web address]  Phone: [phone]

## 2023-08-09 NOTE — ED PROVIDER NOTES
History     Chief Complaint   Patient presents with    Dizziness     HPI  Parker Acevedo is a 52 year old male who presents emergency room today secondary to concerns of feeling some weakness with occasional episodes of dizziness and low-grade fever for the last couple days.  Patient states that he is on chronic TPN secondary to short gut syndrome and malnutrition issues.  He has a Cm catheter in place.  He was recently hospitalized in the first part of July secondary to MRCA bacteremia.  Patient denies significant shortness of breath but has had a mild cough.  Said some nausea and some muscle aches.  He describes the dizziness as a sense of lightheadedness but denies spinning sensation.    Allergies:  Allergies   Allergen Reactions    Bactrim [Sulfamethoxazole-Trimethoprim] Rash    Penicillins Anaphylaxis     Please see Antimicrobial Management Team allergy assessment note 10/10/2018. Patient reported tolerating amoxicillin.  Tolerating cefepime and ceftriaxone without reaction 6/23    Ertapenem Nausea and Vomiting    Doxycycline Rash    Vancomycin Rash     Rash after receiving vancomycin 3/28/16 (infusion reaction?). Tolerated with slower infusion and diphenhydramine premed.  Tolerated 1250mg over 90minutes 7/2023.       Problem List:    Patient Active Problem List    Diagnosis Date Noted    Cellulitis of left upper extremity 07/04/2023     Priority: Medium    Acute deep vein thrombosis (DVT) of axillary vein of left upper extremity (H) 07/04/2023     Priority: Medium    Gram-negative bacteremia 07/07/2022     Priority: Medium    Bacteremia associated with intravascular line, initial encounter (H) 05/07/2022     Priority: Medium    Acute deep vein thrombosis (DVT) of axillary vein of right upper extremity (H) 02/28/2022     Priority: Medium    Fever, unknown origin 03/19/2021     Priority: Medium    Short bowel syndrome 01/30/2021     Priority: Medium    Bloodstream infection associated with central  venous catheter, initial encounter 01/30/2021     Priority: Medium    Gastric outlet obstruction 10/07/2020     Priority: Medium     Added automatically from request for surgery [id number]      Gram-positive bacteremia 09/29/2019     Priority: Medium    On total parenteral nutrition 09/29/2019     Priority: Medium     Added automatically from request for surgery [id number]      PICC line infiltration, sequela 07/22/2019     Priority: Medium    Infection by Candida species 01/04/2019     Priority: Medium    Bacteremia 10/10/2018     Priority: Medium    Hyperlipidemia LDL goal <130 08/07/2018     Priority: Medium    S/P bariatric surgery 07/30/2018     Priority: Medium    Generalized weakness 01/30/2018     Priority: Medium    Catheter-related bloodstream infection (CRBSI) 09/23/2017     Priority: Medium    Low serum iron 09/08/2017     Priority: Medium    Anemia, iron deficiency 09/08/2017     Priority: Medium    Abnormal echocardiogram 04/11/2017     Priority: Medium    Port or reservoir infection, initial encounter 03/16/2017     Priority: Medium    Status post cervical spinal arthrodesis 02/15/2017     Priority: Medium    Short gut syndrome      Priority: Medium    Anxiety      Priority: Medium    Chronic nausea 05/10/2016     Priority: Medium    Fungemia 04/11/2016     Priority: Medium    Positive blood culture 03/29/2016     Priority: Medium    Insomnia 08/13/2015     Priority: Medium    Chronic pain 07/07/2015     Priority: Medium     Patient is followed by Dr. Milligan for ongoing prescription of pain medication.  All refills should be approved by this provider, or covering partner.    Medication(s): Fentanyl 50 mcg patches every three days/ Liquid oxycodone 5 mg/5ml up to 50 mg daily.   Maximum quantity per month: as per Dr. Milligan through [facility]  Clinic visit frequency required: Q 3 months at Pain Management    Controlled substance agreement on file: Yes       Date(s): Through Pain  Management    Pain Clinic evaluation in the past: Yes       Date(s):  Ongoing every three months       Location(s):  Per pain management    DIRE Total Score(s):  No flowsheet data found.    [facility] website verification:  Through Pain Management   [email or web address]    Esteban Daly MD            Health Care Home 02/24/2015     Priority: Medium             Iron deficiency 05/23/2014     Priority: Medium    Vitamin D deficiency 05/22/2014     Priority: Medium    Former smoker 02/24/2014     Priority: Medium    Bile reflux esophagitis 10/16/2013     Priority: Medium    Constipation 10/01/2013     Priority: Medium    Chronic anxiety 09/25/2013     Priority: Medium    Dysphagia 09/17/2013     Priority: Medium    Weight loss, non-intentional 09/17/2013     Priority: Medium    Malnutrition (H) 09/17/2013     Priority: Medium    Dehydration 08/28/2013     Priority: Medium    Vitamin B12 deficiency without anemia 07/31/2013     Priority: Medium     Diagnosis updated by automated process. Provider to review and confirm.      Thiamine deficiency 07/31/2013     Priority: Medium    ADHD (attention deficit hyperactivity disorder), inattentive type 07/02/2013     Priority: Medium     Patient is followed by RICCO SHARP for ongoing prescription of stimulants.  All refills should be approved by this provider, or covering partner.    Medication(s): Adderall.   Maximum quantity per month: 30  Clinic visit frequency required:      Controlled substance agreement on file: No  Neuropsych evaluation for ADD completed:  No    [facility] website verification:  done on 5/6/19  [email or web address]        Anemia 09/20/2012     Priority: Medium    Vomiting 06/28/2012     Priority: Medium    Chronic abdominal pain 06/01/2012     Priority: Medium     Patient is followed by ALEXANDRIA WHITLEY for ongoing prescription of narcotic pain medicine.  Med: liquid oxycodone up to 60 mg per day.   Maximum use per month:    Expected duration: ongoing, hope per surgery that will resolve with upcoming surgery  Narcotic agreement on file: YES  Clinic visit recommended: Q 3 months        Peptic ulcer disease 04/08/2010     Priority: Medium    Gastric bypass status for obesity 04/08/2010     Priority: Medium     Top weight of 492.          Past Medical History:    Past Medical History:   Diagnosis Date    ADHD (attention deficit hyperactivity disorder)     Anxiety     Cardiomyopathy in nutritional diseases (H)     Chronic abdominal pain     CLABSI (central line-associated bloodstream infection)     Complication of anesthesia     Difficulty swallowing     Gastric ulcer, unspecified as acute or chronic, without mention of hemorrhage, perforation, or obstruction     Gastro-oesophageal reflux disease     Head injury     Hiatal hernia     Other bladder disorder     Other chronic pain     PONV (postoperative nausea and vomiting)     Severe malnutrition (H)     Short gut syndrome     Tobacco abuse        Past Surgical History:    Past Surgical History:   Procedure Laterality Date    AMPUTATION      APPENDECTOMY      BACK SURGERY  11/3/2014    curve in the spine    BIOPSY LYMPH NODE CERVICAL N/A 2/20/2015    Procedure: BIOPSY LYMPH NODE CERVICAL;  Surgeon: Baron Scanlon MD;  [facility]    CHOLECYSTECTOMY      COLONOSCOPY N/A 7/14/2021    Procedure: COLONOSCOPY;  Surgeon: Jimbo Estrada MD;  Location: [facility] OR    COLONOSCOPY N/A 4/13/2022    Procedure: COLONOSCOPY;  Surgeon: Jimbo Estrada MD;  Location: [facility] OR    DISCECTOMY, FUSION CERVICAL ANTERIOR ONE LEVEL, COMBINED N/A 2/15/2017    Procedure: COMBINED DISCECTOMY, FUSION CERVICAL ANTERIOR ONE LEVEL;  Surgeon: Darren Campos MD;  [facility]    ENDOSCOPIC INSERTION TUBE GASTROSTOMY  9/9/2013    Procedure: ENDOSCOPIC INSERTION TUBE GASTROSTOMY;;  Surgeon: Francis Vyas MD;  [facility]    ENDOSCOPIC ULTRASOUND UPPER GASTROINTESTINAL TRACT (GI)   4/29/2011    Procedure:ENDOSCOPIC ULTRASOUND UPPER GASTROINTESTINAL TRACT (GI); Both Procedures done Conjointly; Surgeon:NEREIDA HOUSER; [facility]    ENDOSCOPIC ULTRASOUND UPPER GASTROINTESTINAL TRACT (GI)  9/9/2013    Procedure: ENDOSCOPIC ULTRASOUND UPPER GASTROINTESTINAL TRACT (GI);  Endoscopic Ultrasound Guide Gastrostomy Tube Placement  C-arm;  Surgeon: Noe Lizarraga MD;  [facility]    ENDOSCOPIC ULTRASOUND UPPER GASTROINTESTINAL TRACT (GI) N/A 2/24/2021    Procedure: ENDOSCOPIC ULTRASOUND, ESOPHAGOSCOPY / UPPER GASTROINTESTINAL TRACT (GI), esophagastrogastroduodenoscopy;  Surgeon: Berny Bach MD;  [facility]    ENDOSCOPY  03/25/11    EGD, [facility]    ENDOSCOPY  08/04/09    Upper Endoscopy, [facility]    ENDOSCOPY  01/05/09    Upper Endoscopy, [facility]    ESOPHAGOSCOPY, GASTROSCOPY, DUODENOSCOPY (EGD), COMBINED  4/20/2011    Procedure:COMBINED ESOPHAGOSCOPY, GASTROSCOPY, DUODENOSCOPY (EGD); Surgeon:BLU VYAS; [facility]    ESOPHAGOSCOPY, GASTROSCOPY, DUODENOSCOPY (EGD), COMBINED  6/15/2011    Procedure:COMBINED ESOPHAGOSCOPY, GASTROSCOPY, DUODENOSCOPY (EGD); Surgeon:BLU VYAS; [facility]    ESOPHAGOSCOPY, GASTROSCOPY, DUODENOSCOPY (EGD), COMBINED  6/12/2013    Procedure: COMBINED ESOPHAGOSCOPY, GASTROSCOPY, DUODENOSCOPY (EGD);;  Surgeon: Blu Vyas MD;  [facility]    ESOPHAGOSCOPY, GASTROSCOPY, DUODENOSCOPY (EGD), COMBINED  11/22/2013    Procedure: COMBINED ESOPHAGOSCOPY, GASTROSCOPY, DUODENOSCOPY (EGD);;  Surgeon: Blu Vyas MD;  [facility]    ESOPHAGOSCOPY, GASTROSCOPY, DUODENOSCOPY (EGD), COMBINED  4/30/2014    Procedure: COMBINED ESOPHAGOSCOPY, GASTROSCOPY, DUODENOSCOPY (EGD);  Surgeon: Blu Vyas MD;  [facility]    ESOPHAGOSCOPY, GASTROSCOPY, DUODENOSCOPY (EGD), COMBINED N/A 2/20/2015    Procedure: COMBINED ESOPHAGOSCOPY, GASTROSCOPY, DUODENOSCOPY (EGD), BIOPSY  SINGLE OR MULTIPLE;  Surgeon: Baron Scanlon MD;  [facility]    ESOPHAGOSCOPY, GASTROSCOPY, DUODENOSCOPY (EGD), COMBINED N/A 9/30/2015    Procedure: COMBINED ESOPHAGOSCOPY, GASTROSCOPY, DUODENOSCOPY (EGD);  Surgeon: Francis Vyas MD;  Location:  GI    ESOPHAGOSCOPY, GASTROSCOPY, DUODENOSCOPY (EGD), COMBINED N/A 10/3/2019    Procedure: Upper Endoscopy;  Surgeon: Clif Morrow MD;  [facility]    GASTRECTOMY  6/22/2012    Procedure: GASTRECTOMY;  Open Approach, Excise Ulcers,Partial Gastrectomy, Esophagojejunostomy, Hiatal Hernia Repair, Extensive Lysis of Adhesions and Esaphagogastrodudenoscopy.;  Surgeon: Francis Vyas MD;  [facility]    GASTROJEJUNOSTOMY  08/26/09    Extensice enterolysis, partial resect. jejunum, part. resect gastric pouch, gastrojejunostomy anastomosis    HC ESOPH/GAS REFLUX TEST W NASAL IMPED ELECTRODE  8/5/2013    Procedure: ESOPHAGEAL IMPEDENCE FUNCTION TEST 1 HOUR OR LESS;  Surgeon: Halie Lang MD;  Location:  GI    HEAD & NECK SURGERY  2/15/2017    C5-C6    HERNIA REPAIR  2006    Umbilical hernia    HERNIORRHAPHY HIATAL  6/22/2012    Procedure: HERNIORRHAPHY HIATAL;;  Surgeon: Francis Vyas MD;  [facility]    HERNIORRHAPHY INGUINAL  11/22/2013    Procedure: HERNIORRHAPHY INGUINAL;;  Surgeon: Francis Vyas MD;  [facility]    INSERT PICC LINE Right 12/19/2019    Procedure: Picc Placement;  Surgeon: Per Dumont PA-C;  [facility]    INSERT PICC LINE Right 2/21/2020    Procedure: INSERTION, PICC;  Surgeon: Per Dumont PA-C;  [facility]    INSERT PORT VASCULAR ACCESS Right 12/19/2017    Procedure: INSERT PORT VASCULAR ACCESS;  Right Chest Port Placement ;  Surgeon: Lisandro Alejandro PA-C;  [facility]    INSERT PORT VASCULAR ACCESS Right 8/2/2018    Procedure: INSERT PORT VASCULAR ACCESS;  Place single lumen tunneled central venous access catheter;  Surgeon: Guy Jamil,  BRITTANY;  [facility]    IR CVC TUNNEL PLACEMENT > 5 YRS OF AGE  8/7/2019    IR CVC TUNNEL PLACEMENT > 5 YRS OF AGE  4/14/2020    IR CVC TUNNEL PLACEMENT > 5 YRS OF AGE  8/3/2020    IR CVC TUNNEL PLACEMENT > 5 YRS OF AGE  9/4/2020    IR CVC TUNNEL PLACEMENT > 5 YRS OF AGE  2/5/2021    IR CVC TUNNEL PLACEMENT > 5 YRS OF AGE  3/23/2021    IR CVC TUNNEL PLACEMENT > 5 YRS OF AGE  1/13/2022    IR CVC TUNNEL PLACEMENT > 5 YRS OF AGE  5/12/2022    IR CVC TUNNEL PLACEMENT > 5 YRS OF AGE  7/13/2022    IR CVC TUNNEL PLACEMENT > 5 YRS OF AGE  7/10/2023    IR CVC TUNNEL REMOVAL LEFT  6/7/2023    IR CVC TUNNEL REMOVAL RIGHT  10/1/2019    IR CVC TUNNEL REMOVAL RIGHT  7/30/2020    IR CVC TUNNEL REMOVAL RIGHT  9/2/2020    IR CVC TUNNEL REMOVAL RIGHT  2/3/2021    IR CVC TUNNEL REMOVAL RIGHT  3/19/2021    IR CVC TUNNEL REMOVAL RIGHT  1/10/2022    IR CVC TUNNEL REMOVAL RIGHT  5/9/2022    IR CVC TUNNEL REMOVAL RIGHT  7/8/2022    IR CVC TUNNEL REVISION LEFT  8/10/2022    IR CVC TUNNEL REVISION RIGHT  5/7/2021    IR FOLLOW UP VISIT OUTPATIENT  8/7/2019    IR PICC EXCHANGE LEFT  6/8/2023    IR PICC PLACEMENT > 5 YRS OF AGE  3/7/2019    IR PICC PLACEMENT > 5 YRS OF AGE  12/19/2019    IR PICC PLACEMENT > 5 YRS OF AGE  2/21/2020    IR PICC PLACEMENT > 5 YRS OF AGE  6/7/2023    LAPAROTOMY EXPLORATORY  11/22/2013    Procedure: LAPAROTOMY EXPLORATORY;  Exploratory Laparotomy, Upper Endoscopy, Left Inguinal Hernia Repair;  Surgeon: Francis Vyas MD;  [facility]    ORTHOPEDIC SURGERY      PICC INSERTION Right 03/16/2017    5fr DL BioFlo PICC, 42cm (3cm external) in the R medial brachial vein w/ tip in the SVC RA junction.    PICC INSERTION Left 09/23/2017    5fr DL BioFlo PICC, 45cm (1cm external) in the L basilic vein w/ tip in the SVC RA junction.    PICC INSERTION Right 05/16/2019    5Fr - 43cm, Medial brachial vein, low SVC    PICC INSERTION Right 10/02/2019    5Fr - 43cm (2cm external), basilic vein, low SVC    SHAYLEE EN Y BOWEL   2003    SOFT TISSUE SURGERY      THORACIC SURGERY      TONSILLECTOMY      TRANSESOPHAGEAL ECHOCARDIOGRAM INTRAOPERATIVE N/A 1/8/2019    Procedure: TRANSESOPHAGEAL ECHOCARDIOGRAM INTRAOPERATIVE;  Surgeon: GENERIC ANESTHESIA PROVIDER;  [facility]    TRANSESOPHAGEAL ECHOCARDIOGRAM INTRAOPERATIVE N/A 7/7/2023    Procedure: Transesophageal echocardiogram in the OR;  Surgeon: GENERIC ANESTHESIA PROVIDER;  [facility]    ZZC GASTRIC BYPASS,OBESE<100CM SHAYLEE-EN-Y  2002    lost 300 pounds       Family History:    Family History   Problem Relation Age of Onset    Gastrointestinal Disease Mother         Crohns disease    Anxiety Disorder Mother     Thyroid Disease Mother         Grave's disease    Cancer Father         ear cancer-skin cancer/melanoma    Breast Cancer Maternal Grandmother     Macular Degeneration Maternal Grandfather     Anxiety Disorder Sister     Diabetes Maternal Uncle     Breast Cancer Other     Hypertension No family hx of     Hyperlipidemia No family hx of     Cerebrovascular Disease No family hx of     Prostate Cancer No family hx of     Depression No family hx of     Anesthesia Reaction No family hx of     Asthma No family hx of     Osteoporosis No family hx of     Genetic Disorder No family hx of     Obesity No family hx of     Mental Illness No family hx of     Substance Abuse No family hx of     Glaucoma No family hx of        Social History:  Marital Status:   [2]  Social History     Tobacco Use    Smoking status: Light Smoker     Packs/day: 0.50     Years: 3.00     Pack years: 1.50     Types: Cigarettes    Smokeless tobacco: Never    Tobacco comments:     2/4/2021    smokes 3 cigarettes/day   Vaping Use    Vaping Use: Never used   Substance Use Topics    Alcohol use: No     Comment: quit 2002    Drug use: No        Medications:    albuterol (VENTOLIN HFA) 108 (90 Base) MCG/ACT inhaler  amphetamine-dextroamphetamine (ADDERALL) 20 MG tablet  carvedilol (COREG) 6.25 MG  "tablet  cyanocobalamin (CYANOCOBALAMIN) 1000 MCG/ML injection  enoxaparin ANTICOAGULANT (LOVENOX) 80 MG/0.8ML syringe  [facility]  fentaNYL (DURAGESIC) 25 mcg/hr 72 hr patch  levofloxacin (LEVAQUIN) 25 MG/ML solution  lidocaine (LIDODERM) 5 % patch  LORazepam (ATIVAN) 1 MG tablet  naloxone (NARCAN) 4 MG/0.1ML nasal spray  nystatin (MYCOSTATIN) 683439 UNIT/GM external cream  ondansetron (ZOFRAN ODT) 8 MG ODT tab  oxyCODONE (ROXICODONE) 5 MG/5ML solution  polyethylene glycol (MIRALAX) 17 GM/Dose powder  sodium chloride 4.64 mL with gentamicin 12.4 mg, heparin (porcine) 50 Units for Dialysis Catheter Care  sodium chloride 4.64 mL with gentamicin 12.4 mg, heparin (porcine) 50 Units for Dialysis Catheter Care  sucralfate (CARAFATE) 1 GM/10ML suspension  Syringe/Needle, Disp, (B-D ECLIPSE SYRINGE) 27G X 1/2\" 1 ML MISC  vitamin D2 (ERGOCALCIFEROL) 79532 units (1250 mcg) capsule          Review of Systems   Constitutional:  Positive for chills and fever.   Gastrointestinal:  Positive for nausea.   Musculoskeletal:  Positive for myalgias.   All other systems reviewed and are negative.      Physical Exam   BP: 109/72  Pulse: 87  Temp: 99.4  F (37.4  C)  Resp: 20  Height: 177.8 cm (5' 10\")  Weight: 86.6 kg (191 lb)  SpO2: 93 %      Physical Exam  Vitals and nursing note reviewed.   Constitutional:       General: He is in acute distress (Mild).      Appearance: He is not toxic-appearing or diaphoretic.   HENT:      Head: Normocephalic and atraumatic.      Nose: Congestion present.      Mouth/Throat:      Mouth: Mucous membranes are moist.   Eyes:      General: No scleral icterus.     Extraocular Movements: Extraocular movements intact.      Conjunctiva/sclera: Conjunctivae normal.      Pupils: Pupils are equal, round, and reactive to light.   Cardiovascular:      Rate and Rhythm: Normal rate.      Pulses: Normal pulses.   Pulmonary:      Effort: Pulmonary effort is normal.      Breath sounds: Rhonchi " (bases) present. No wheezing or rales.   Abdominal:      Tenderness: There is no abdominal tenderness.   Musculoskeletal:         General: Normal range of motion.      Cervical back: Normal range of motion and neck supple.   Skin:     General: Skin is warm.      Capillary Refill: Capillary refill takes less than 2 seconds.      Coloration: Skin is not jaundiced.      Findings: No erythema or lesion.   Neurological:      Mental Status: He is alert and oriented to person, place, and time.   Psychiatric:         Mood and Affect: Mood normal.         Behavior: Behavior normal.         ED Course                 Procedures              Critical Care time:  none               Results for orders placed or performed during the hospital encounter of 08/04/23   XR Chest 2 Views     Status: None    Narrative    EXAM: XR CHEST 2 VIEWS  LOCATION: [facility]  DATE: 8/4/2023    INDICATION: Fever  COMPARISON: 06/04/2023.    FINDINGS: Left IJ infusion catheter. No pneumothorax. The heart size is normal. There is mild left basilar infiltrate. Curvilinear scarring at the right lung base. The lungs are otherwise clear. No pleural effusion.      Impression    IMPRESSION: Left basilar pneumonia.   Comprehensive metabolic panel     Status: Abnormal   Result Value Ref Range    Sodium 139 136 - 145 mmol/L    Potassium 3.4 3.4 - 5.3 mmol/L    Chloride 103 98 - 107 mmol/L    Carbon Dioxide (CO2) 26 22 - 29 mmol/L    Anion Gap 10 7 - 15 mmol/L    Urea Nitrogen 9.8 6.0 - 20.0 mg/dL    Creatinine 0.76 0.67 - 1.17 mg/dL    Calcium 8.3 (L) 8.6 - 10.0 mg/dL    Glucose 103 (H) 70 - 99 mg/dL    Alkaline Phosphatase 187 (H) 40 - 129 U/L     (H) 0 - 45 U/L     (H) 0 - 70 U/L    Protein Total 6.5 6.4 - 8.3 g/dL    Albumin 3.6 3.5 - 5.2 g/dL    Bilirubin Total 0.3 <=1.2 mg/dL    GFR Estimate >90 >60 mL/min/1.73m2   Lactic acid whole blood     Status: Normal   Result Value Ref Range    Lactic Acid 0.8 0.7 - 2.0  mmol/L   Blood gas venous     Status: Abnormal   Result Value Ref Range    pH Venous 7.41 7.32 - 7.43    pCO2 Venous 45 40 - 50 mm Hg    pO2 Venous 37 25 - 47 mm Hg    Bicarbonate Venous 29 (H) 21 - 28 mmol/L    Base Excess/Deficit (+/-) 3.7 (H) -7.7 - 1.9 mmol/L    FIO2 21    Procalcitonin     Status: Abnormal   Result Value Ref Range    Procalcitonin 2.49 (H) <0.05 ng/mL   CBC with platelets and differential     Status: Abnormal   Result Value Ref Range    WBC Count 9.5 4.0 - 11.0 10e3/uL    RBC Count 3.69 (L) 4.40 - 5.90 10e6/uL    Hemoglobin 11.0 (L) 13.3 - 17.7 g/dL    Hematocrit 34.6 (L) 40.0 - 53.0 %    MCV 94 78 - 100 fL    MCH 29.8 26.5 - 33.0 pg    MCHC 31.8 31.5 - 36.5 g/dL    RDW 16.1 (H) 10.0 - 15.0 %    Platelet Count 155 150 - 450 10e3/uL    % Neutrophils 94 %    % Lymphocytes 3 %    % Monocytes 2 %    % Eosinophils 1 %    % Basophils 0 %    % Immature Granulocytes 0 %    NRBCs per 100 WBC 0 <1 /100    Absolute Neutrophils 8.9 (H) 1.6 - 8.3 10e3/uL    Absolute Lymphocytes 0.3 (L) 0.8 - 5.3 10e3/uL    Absolute Monocytes 0.2 0.0 - 1.3 10e3/uL    Absolute Eosinophils 0.1 0.0 - 0.7 10e3/uL    Absolute Basophils 0.0 0.0 - 0.2 10e3/uL    Absolute Immature Granulocytes 0.0 <=0.4 10e3/uL    Absolute NRBCs 0.0 10e3/uL   Symptomatic Influenza A/B, RSV, & SARS-CoV2 PCR (COVID-19) Nose     Status: Normal    Specimen: Nose; Swab   Result Value Ref Range    Influenza A PCR Negative Negative    Influenza B PCR Negative Negative    RSV PCR Negative Negative    SARS CoV2 PCR Negative Negative    Narrative    Testing was performed using the Xpert Xpress CoV2/Flu/RSV Assay on the Cepheid GeneXpert Instrument. This test should be ordered for the detection of SARS-CoV-2, influenza, and RSV viruses in individuals who meet clinical and/or epidemiological criteria. Test performance is unknown in asymptomatic patients. This test is for in vitro diagnostic use under the FDA EUA for laboratories certified under CLIA to perform  high or moderate complexity testing. This test has not been FDA cleared or approved. A negative result does not rule out the presence of PCR inhibitors in the specimen or target RNA in concentration below the limit of detection for the assay. If only one viral target is positive but coinfection with multiple targets is suspected, the sample should be re-tested with another FDA cleared, approved, or authorized test, if coinfection would change clinical management. This test was validated by the [facility]. These laboratories are certified under the Clinical Laboratory Improvement Amendments of 1988 (CLIA-88) as qualified to perform high complexity laboratory testing.   Blood Culture Line, venous     Status: Abnormal    Specimen: Line, venous; Blood   Result Value Ref Range    Culture Positive on the 1st day of incubation (A)     Culture Klebsiella pneumoniae (AA)        Susceptibility    Klebsiella pneumoniae - ELISA     Ampicillin*  Resistant ug/mL      * Intrinsically Resistant     Ampicillin/ Sulbactam  Susceptible ug/mL     Piperacillin/Tazobactam  Susceptible ug/mL     Ceftazidime  Susceptible ug/mL     Ceftriaxone  Susceptible ug/mL     Cefepime  Susceptible ug/mL     Meropenem  Susceptible ug/mL     Gentamicin  Susceptible ug/mL     Tobramycin  Susceptible ug/mL     Ciprofloxacin  Susceptible ug/mL     Levofloxacin  Susceptible ug/mL     Trimethoprim/Sulfamethoxazole  Susceptible ug/mL   Blood Culture Line, venous     Status: Normal (Preliminary result)    Specimen: Line, venous; Blood   Result Value Ref Range    Culture No growth after 4 days    Verigene GN Panel     Status: Abnormal    Specimen: Line, venous; Blood   Result Value Ref Range    Acinetobacter species Not Detected Not Detected    Citrobacter species Not Detected Not Detected    Enterobacter species Not Detected Not Detected    Proteus species Not Detected Not Detected    Escherichia coli Not Detected Not Detected    Klebsiella  pneumoniae Detected (A) Not Detected    Klebsiella oxytoca Not Detected Not Detected    Pseudomonas aeruginosa Not Detected Not Detected    CTX-M Not Detected Not Detected, NA    KPC Not Detected Not Detected, NA    NDM Not Detected Not Detected, NA    VIM Not Detected Not Detected, NA    IMP Not Detected Not Detected, NA    OXA Not Detected Not Detected, NA    Narrative    Specimen tested with Verigene multiplex, gram-negative blood culture nucleic acid test for the following targets: Acinetobacter species, Citrobacter species, Enterobacter species, Proteus species, Escherichia coli, Klebsiella pneumoniae, Klebsiella oxytoca, Pseudomonas aeruginosa, and the following resistance markers: CTX-M, KPC, NDM, VIM, IMP and OXA.   CBC with platelets differential     Status: Abnormal    Narrative    The following orders were created for panel order CBC with platelets differential.  Procedure                               Abnormality         Status                     ---------                               -----------         ------                     CBC with platelets and d...[[id number]]  Abnormal            Final result                 Please view results for these tests on the individual orders.         Medications   cefTRIAXone (ROCEPHIN) 1 g vial to attach to  mL bag for ADULTS or NS 50 mL bag for PEDS (0 g Intravenous Stopped 8/4/23 0520)   azithromycin (ZITHROMAX) 500 mg vial to attach to  mL bag (0 mg Intravenous Stopped 8/4/23 0605)   ondansetron (ZOFRAN ODT) ODT tab 4 mg (4 mg Oral $Given 8/4/23 0506)       Assessments & Plan (with Medical Decision Making)  52-year-old male to the ER secondary to concerns of fatigue and fever.  Patient with an indwelling Cm catheter secondary to need for TPN due to short gut syndrome.  Patient with exam findings with evidence of left-sided basilar infiltrates consistent with pneumonia is likely reason for symptoms.  Patient was given a dose of IV Rocephin and IV  Zithromax.  He was feeling improved and desired return to home.  Zithromax and cefdinir prescriptions sent to his pharmacy.  He is encouraged to return should he have any increase or worsening of symptoms.  Blood culture results were initiated with results pending.  Should this turn positive we will contact him for further recommendations if needed.  Encourage follow-up in the clinic for recheck and a note was sent to his internal medicine physician to work him in for recheck in the clinic in the next few days.  Patient was comfortable with the plan of care.     I have reviewed the nursing notes.    I have reviewed the findings, diagnosis, plan and need for follow up with the patient.           Medical Decision Making  The patient's presentation was of moderate complexity (an acute illness with systemic symptoms).    The patient's evaluation involved:  ordering and/or review of 3+ test(s) in this encounter (see separate area of note for details)    The patient's management necessitated moderate risk (prescription drug management including medications given in the ED).        Discharge Medication List as of 8/4/2023  6:06 AM        START taking these medications    Details   azithromycin (ZITHROMAX) 200 MG/5ML suspension Take 6.25 mLs (250 mg) by mouth daily for 4 days, Disp-25 mL, R-0, E-Prescribe      cefdinir (OMNICEF) 250 MG/5ML suspension Take 12 mLs (600 mg) by mouth daily for 10 days, Disp-120 mL, R-0, E-Prescribe                  I verbally discussed the findings of the evaluation today in the ER. I have verbally discussed with Parker the suggested treatment(s) as described in the discharge instructions and handouts. I have prescribed the above listed medications and instructed him on appropriate use of these medications.      I have verbally suggested he follow-up in his clinic or return to the ER for increased symptoms. See the follow-up recommendations documented  in the after visit summary in this visit's  EPIC chart.      Disclaimer: This note consists of words and symbols derived from keyboarding and dictation using voice recognition software.  As a result, there may be errors that have gone undetected.  Please consider this when interpreting information found in this note.    Final diagnoses:   Pneumonia of left lower lobe due to infectious organism       8/4/2023   [facility] EMERGENCY DEPT       Ab Loera, DO  08/09/23 0314

## 2023-08-10 ENCOUNTER — TELEPHONE (OUTPATIENT)
Dept: ENDOCRINOLOGY | Facility: CLINIC | Age: 53
End: 2023-08-10
Payer: COMMERCIAL

## 2023-08-10 NOTE — PROGRESS NOTES
Clinic Care Coordination Contact  [facility]/Voicemail       Clinical Data: Care Coordinator Outreach  Outreach attempted x 2.  Unable to leave message.  Plan: Care Coordinator will send unable to contact letter with care coordinator contact information via [employer]. Care Coordinator will try to reach patient again in 1 month.    Kinsey Muhammad, RN Care Coordination   [facility]  Email: [email or web address]  Phone: [phone]

## 2023-08-10 NOTE — TELEPHONE ENCOUNTER
Called and spoke with CHRISTY Patricia. Orders were faxed this morning after being signed by Dr. Vyas.

## 2023-08-10 NOTE — TELEPHONE ENCOUNTER
General Call    Contacts         Type Contact Phone/Fax    08/10/2023 08:39 AM CDT Phone (Incoming) [facility] Patricia [facility] [phone]          Reason for Call:  RN calling requesting new orders:  Continue skilled nursing visits, 1/wk for 5 weeks.

## 2023-08-11 LAB
BACTERIA BLD CULT: NO GROWTH
BACTERIA BLD CULT: NO GROWTH

## 2023-08-12 LAB
BACTERIA BLD CULT: NO GROWTH
BACTERIA BLD CULT: NO GROWTH

## 2023-08-14 ENCOUNTER — MEDICAL CORRESPONDENCE (OUTPATIENT)
Dept: HEALTH INFORMATION MANAGEMENT | Facility: CLINIC | Age: 53
End: 2023-08-14
Payer: COMMERCIAL

## 2023-08-14 ENCOUNTER — MYC MEDICAL ADVICE (OUTPATIENT)
Dept: INTERNAL MEDICINE | Facility: CLINIC | Age: 53
End: 2023-08-14
Payer: COMMERCIAL

## 2023-08-14 DIAGNOSIS — G47.00 INSOMNIA: ICD-10-CM

## 2023-08-14 DIAGNOSIS — F90.0 ADHD (ATTENTION DEFICIT HYPERACTIVITY DISORDER), INATTENTIVE TYPE: ICD-10-CM

## 2023-08-14 DIAGNOSIS — F41.9 CHRONIC ANXIETY: ICD-10-CM

## 2023-08-15 ENCOUNTER — LAB REQUISITION (OUTPATIENT)
Dept: LAB | Facility: CLINIC | Age: 53
End: 2023-08-15
Payer: COMMERCIAL

## 2023-08-15 DIAGNOSIS — R13.10 DYSPHAGIA, UNSPECIFIED: ICD-10-CM

## 2023-08-15 LAB
BASOPHILS # BLD AUTO: 0 10E3/UL (ref 0–0.2)
BASOPHILS NFR BLD AUTO: 0 %
EOSINOPHIL # BLD AUTO: 0.1 10E3/UL (ref 0–0.7)
EOSINOPHIL NFR BLD AUTO: 2 %
ERYTHROCYTE [DISTWIDTH] IN BLOOD BY AUTOMATED COUNT: 15.6 % (ref 10–15)
HCT VFR BLD AUTO: 37.5 % (ref 40–53)
HGB BLD-MCNC: 11.7 G/DL (ref 13.3–17.7)
IMM GRANULOCYTES # BLD: 0 10E3/UL
IMM GRANULOCYTES NFR BLD: 0 %
LYMPHOCYTES # BLD AUTO: 1.4 10E3/UL (ref 0.8–5.3)
LYMPHOCYTES NFR BLD AUTO: 20 %
MCH RBC QN AUTO: 28.7 PG (ref 26.5–33)
MCHC RBC AUTO-ENTMCNC: 31.2 G/DL (ref 31.5–36.5)
MCV RBC AUTO: 92 FL (ref 78–100)
MONOCYTES # BLD AUTO: 0.5 10E3/UL (ref 0–1.3)
MONOCYTES NFR BLD AUTO: 7 %
NEUTROPHILS # BLD AUTO: 5 10E3/UL (ref 1.6–8.3)
NEUTROPHILS NFR BLD AUTO: 71 %
NRBC # BLD AUTO: 0 10E3/UL
NRBC BLD AUTO-RTO: 0 /100
PLATELET # BLD AUTO: 240 10E3/UL (ref 150–450)
RBC # BLD AUTO: 4.07 10E6/UL (ref 4.4–5.9)
WBC # BLD AUTO: 7.2 10E3/UL (ref 4–11)

## 2023-08-15 PROCEDURE — 80053 COMPREHEN METABOLIC PANEL: CPT | Performed by: SURGERY

## 2023-08-15 PROCEDURE — 84478 ASSAY OF TRIGLYCERIDES: CPT | Performed by: SURGERY

## 2023-08-15 PROCEDURE — 84100 ASSAY OF PHOSPHORUS: CPT | Performed by: SURGERY

## 2023-08-15 PROCEDURE — 83735 ASSAY OF MAGNESIUM: CPT | Performed by: SURGERY

## 2023-08-15 PROCEDURE — 82248 BILIRUBIN DIRECT: CPT | Performed by: SURGERY

## 2023-08-15 PROCEDURE — 85004 AUTOMATED DIFF WBC COUNT: CPT | Performed by: SURGERY

## 2023-08-15 NOTE — TELEPHONE ENCOUNTER
Patient is requesting an ED/HOSP Follow-up appointment to discuss his recent ED visits and admissions.  He would also like to discuss his medications.    Next available ED/HOSP Follow-up is not until 09/06/23.  Can patient be fit in prior to this date or ok to wait until then?

## 2023-08-16 LAB
ALBUMIN SERPL BCG-MCNC: 3.9 G/DL (ref 3.5–5.2)
ALP SERPL-CCNC: 85 U/L (ref 40–129)
ALT SERPL W P-5'-P-CCNC: 18 U/L (ref 0–70)
ANION GAP SERPL CALCULATED.3IONS-SCNC: 11 MMOL/L (ref 7–15)
AST SERPL W P-5'-P-CCNC: 20 U/L (ref 0–45)
BILIRUB DIRECT SERPL-MCNC: <0.2 MG/DL (ref 0–0.3)
BILIRUB SERPL-MCNC: 0.3 MG/DL
BUN SERPL-MCNC: 9.3 MG/DL (ref 6–20)
CALCIUM SERPL-MCNC: 8.8 MG/DL (ref 8.6–10)
CHLORIDE SERPL-SCNC: 107 MMOL/L (ref 98–107)
CREAT SERPL-MCNC: 0.69 MG/DL (ref 0.67–1.17)
DEPRECATED HCO3 PLAS-SCNC: 25 MMOL/L (ref 22–29)
FASTING STATUS PATIENT QL REPORTED: NORMAL
GFR SERPL CREATININE-BSD FRML MDRD: >90 ML/MIN/1.73M2
GLUCOSE SERPL-MCNC: 84 MG/DL (ref 70–99)
MAGNESIUM SERPL-MCNC: 2 MG/DL (ref 1.7–2.3)
PHOSPHATE SERPL-MCNC: 3.1 MG/DL (ref 2.5–4.5)
POTASSIUM SERPL-SCNC: 3.7 MMOL/L (ref 3.4–5.3)
PROT SERPL-MCNC: 6.9 G/DL (ref 6.4–8.3)
SODIUM SERPL-SCNC: 143 MMOL/L (ref 136–145)
TRIGL SERPL-MCNC: 86 MG/DL

## 2023-08-16 RX ORDER — ALPRAZOLAM 0.5 MG
0.5 TABLET ORAL 2 TIMES DAILY PRN
Qty: 60 TABLET | Refills: 0 | Status: CANCELLED | OUTPATIENT
Start: 2023-08-16

## 2023-08-16 RX ORDER — LORAZEPAM 1 MG/1
1 TABLET ORAL AT BEDTIME
Qty: 30 TABLET | Refills: 0 | Status: SHIPPED | OUTPATIENT
Start: 2023-08-16 | End: 2023-09-05

## 2023-08-16 NOTE — TELEPHONE ENCOUNTER
Ok to switch from lorazepam to alprazolam?  If so, alprazolam at previous dose pended.    - addition to message-  Appointment suggested for patient is no longer available.  Please let us know if he will also still need the appointment.    Scheduled for follow up September 5th - medication and anxiety.    Kristin Jaimes XRO/

## 2023-08-17 ENCOUNTER — NURSE TRIAGE (OUTPATIENT)
Dept: INTERNAL MEDICINE | Facility: CLINIC | Age: 53
End: 2023-08-17
Payer: COMMERCIAL

## 2023-08-17 DIAGNOSIS — G47.00 INSOMNIA: ICD-10-CM

## 2023-08-17 DIAGNOSIS — F41.9 CHRONIC ANXIETY: ICD-10-CM

## 2023-08-17 RX ORDER — ALPRAZOLAM 0.5 MG
0.5 TABLET ORAL 2 TIMES DAILY PRN
Qty: 60 TABLET | Refills: 0 | Status: SHIPPED | OUTPATIENT
Start: 2023-08-17 | End: 2023-09-14

## 2023-08-17 NOTE — TELEPHONE ENCOUNTER
Yes that is ok, new refill done. Should bring in the old script to the pharmacy instead of using both.

## 2023-08-17 NOTE — TELEPHONE ENCOUNTER
Provider: Patient is wanting to switch from Lorazepam for sleep to Xanax that he used to take.  Wife would like a call back once prescription sent.  Or do you feel patient needs an appointment to discuss this? Thank you!     S: Medication Change    B: Wife is calling in (C2C on file) with request from patient for medication change.  Patient takes Lorazepam for sleep.  This is no longer working for patient and he's not able to sleep.  Patient is wanting to switch back to the Xanax he was on before.  Denies panic attacks or troubles with anxiety during the day.  Denies suicidal thoughts. Last prescription for this was in 2014. Last appointment with PCP on 6/2/23.     A: Advised patient's wife that PCP is  not in clinic today but is back tomorrow. RN reviewed red flag symptoms with patient and when to seek emergency care.     R: Wife verbalizes understanding. Would like a call back once prescription or response back from provider. Would like sent to [facility].        Reason for Disposition   Symptoms of anxiety or panic attack and is a chronic symptom (recurrent or ongoing AND present > 4 weeks)    Additional Information   Negative: SEVERE difficulty breathing (e.g., struggling for each breath, speaks in single words)   Negative: Bluish (or gray) lips or face   Negative: Difficult to awaken or acting confused (e.g., disoriented, slurred speech)   Negative: Hysterical or combative behavior   Negative: Sounds like a life-threatening emergency to the triager   Negative: Chest pain   Negative: Palpitations, skipped heart beat, or rapid heart beat   Negative: Cough is main symptom   Negative: Suicide thoughts, threats, attempts, or questions   Negative: Depression is main problem or symptom (e.g., feelings of sadness or hopelessness)   Negative: Difficulty breathing and persists > 10 minutes and not relieved by reassurance provided by triager   Negative: Lightheadedness or dizziness and persists > 10 minutes  and not relieved by reassurance provided by triager   Negative: Substance use (drug use) or unhealthy alcohol use, known or suspected, and feeling very shaky (i.e., visible tremors of hands)   Negative: Patient sounds very sick or weak to the triager   Negative: Patient sounds very upset or troubled to the triager   Negative: Symptoms interfere with work or school   Negative: Symptoms of anxiety or panic and has not been evaluated for this by physician   Negative: Started on anti-anxiety medication and no relief   Negative: Significant weight loss (or gain) and not dieting   Negative: Taking thyroid medications   Negative: Substance use (drug use) or unhealthy alcohol use, known or suspected   Negative: Unhealthy alcohol use, known or suspected   Negative: Unhealthy caffeine use, known or suspected (e.g., > 2 cups of coffee/tea or > 4 cans of soda / day)   Negative: Taking herbal remedies   Negative: Recent traumatic event (e.g., death of a loved one, job loss, victim/witness of crime)   Negative: Requesting to talk to a counselor (e.g., mental health worker, psychiatrist)   Negative: Patient wants to be seen   Negative: Symptoms interfere with sleep    Protocols used: Anxiety and Panic Attack-A-[place]

## 2023-08-21 ENCOUNTER — TELEPHONE (OUTPATIENT)
Dept: PALLIATIVE MEDICINE | Facility: CLINIC | Age: 53
End: 2023-08-21
Payer: COMMERCIAL

## 2023-08-21 NOTE — TELEPHONE ENCOUNTER
Yes, this is fine if it is changed to a virtual visit due to heat and not having vehicle AC.     Natalie MENARD, RN CNP, FNP  [facility]  [facility] [place]

## 2023-08-21 NOTE — TELEPHONE ENCOUNTER
Reason for Call:  Other    Detailed comments: Patient's wife called to see if his Wednesday 8/23/2023 visit can be changed to a virtual visit (phone visit - they don't have a smartphone or a computer with internet) since it's supposed to be really hot that day and their vehicle doesn't have air conditioning.    While waiting for a response back from the medical team, patient's wife went ahead and scheduled another in-person visit for 11/1/2023 at 11:00 AM.      Phone Number Patient's wife can be reached at: [phone]    Best Time: anytime    Can we leave a detailed message on this number? YES    Call taken on 8/21/2023 at 12:51 PM by Soraida Núñez

## 2023-08-22 ENCOUNTER — MEDICAL CORRESPONDENCE (OUTPATIENT)
Dept: HEALTH INFORMATION MANAGEMENT | Facility: CLINIC | Age: 53
End: 2023-08-22

## 2023-08-22 ENCOUNTER — LAB REQUISITION (OUTPATIENT)
Dept: LAB | Facility: CLINIC | Age: 53
End: 2023-08-22
Payer: COMMERCIAL

## 2023-08-22 DIAGNOSIS — R13.10 DYSPHAGIA, UNSPECIFIED: ICD-10-CM

## 2023-08-22 LAB
BASOPHILS # BLD AUTO: 0 10E3/UL (ref 0–0.2)
BASOPHILS NFR BLD AUTO: 0 %
EOSINOPHIL # BLD AUTO: 0.2 10E3/UL (ref 0–0.7)
EOSINOPHIL NFR BLD AUTO: 4 %
ERYTHROCYTE [DISTWIDTH] IN BLOOD BY AUTOMATED COUNT: 15.9 % (ref 10–15)
HCT VFR BLD AUTO: 36.5 % (ref 40–53)
HGB BLD-MCNC: 11.6 G/DL (ref 13.3–17.7)
HOLD SPECIMEN: NORMAL
HOLD SPECIMEN: NORMAL
IMM GRANULOCYTES # BLD: 0 10E3/UL
IMM GRANULOCYTES NFR BLD: 0 %
LYMPHOCYTES # BLD AUTO: 2.5 10E3/UL (ref 0.8–5.3)
LYMPHOCYTES NFR BLD AUTO: 36 %
MCH RBC QN AUTO: 28.9 PG (ref 26.5–33)
MCHC RBC AUTO-ENTMCNC: 31.8 G/DL (ref 31.5–36.5)
MCV RBC AUTO: 91 FL (ref 78–100)
MONOCYTES # BLD AUTO: 0.7 10E3/UL (ref 0–1.3)
MONOCYTES NFR BLD AUTO: 10 %
NEUTROPHILS # BLD AUTO: 3.5 10E3/UL (ref 1.6–8.3)
NEUTROPHILS NFR BLD AUTO: 51 %
PLATELET # BLD AUTO: 193 10E3/UL (ref 150–450)
RBC # BLD AUTO: 4.01 10E6/UL (ref 4.4–5.9)
WBC # BLD AUTO: 6.9 10E3/UL (ref 4–11)

## 2023-08-22 PROCEDURE — 85025 COMPLETE CBC W/AUTO DIFF WBC: CPT | Performed by: SURGERY

## 2023-08-22 NOTE — TELEPHONE ENCOUNTER
Called patient's wife and updated tomorrow's visit to be a video visit.      Soraida DUPREE    [facility] [place]

## 2023-08-23 ENCOUNTER — VIRTUAL VISIT (OUTPATIENT)
Dept: PALLIATIVE MEDICINE | Facility: CLINIC | Age: 53
End: 2023-08-23
Attending: NURSE PRACTITIONER
Payer: COMMERCIAL

## 2023-08-23 DIAGNOSIS — Z79.891 LONG TERM (CURRENT) USE OF OPIATE ANALGESIC: ICD-10-CM

## 2023-08-23 DIAGNOSIS — G89.29 CHRONIC ABDOMINAL PAIN: ICD-10-CM

## 2023-08-23 DIAGNOSIS — M54.2 CERVICALGIA: ICD-10-CM

## 2023-08-23 DIAGNOSIS — R10.9 CHRONIC ABDOMINAL PAIN: ICD-10-CM

## 2023-08-23 DIAGNOSIS — G89.29 CHRONIC BILATERAL LOW BACK PAIN WITHOUT SCIATICA: ICD-10-CM

## 2023-08-23 DIAGNOSIS — M54.50 CHRONIC BILATERAL LOW BACK PAIN WITHOUT SCIATICA: ICD-10-CM

## 2023-08-23 DIAGNOSIS — R19.8 VISCERAL HYPERALGESIA: ICD-10-CM

## 2023-08-23 DIAGNOSIS — F11.90 CHRONIC, CONTINUOUS USE OF OPIOIDS: ICD-10-CM

## 2023-08-23 DIAGNOSIS — G89.4 CHRONIC PAIN SYNDROME: ICD-10-CM

## 2023-08-23 PROCEDURE — 99214 OFFICE O/P EST MOD 30 MIN: CPT | Mod: VID | Performed by: NURSE PRACTITIONER

## 2023-08-23 RX ORDER — FENTANYL 25 UG/1
1 PATCH TRANSDERMAL
Qty: 15 PATCH | Refills: 0 | Status: SHIPPED | OUTPATIENT
Start: 2023-08-23 | End: 2023-09-21

## 2023-08-23 RX ORDER — OXYCODONE HCL 5 MG/5 ML
5-10 SOLUTION, ORAL ORAL EVERY 4 HOURS PRN
Qty: 1200 ML | Refills: 0 | Status: SHIPPED | OUTPATIENT
Start: 2023-08-23 | End: 2023-09-22

## 2023-08-23 ASSESSMENT — PAIN SCALES - GENERAL: PAINLEVEL: SEVERE PAIN (6)

## 2023-08-23 NOTE — NURSING NOTE
2/9/2022     2:06 PM 5/23/2023     7:59 AM 8/23/2023    10:59 AM   PEG Score   PEG Total Score 6 5.67 5.33

## 2023-08-23 NOTE — PROGRESS NOTES
"Is Pt currently in [place]? Yes    NOTE:  If Pt is not in [place], Appointment needs to be canceled and rescheduled.  Parker is a 52 year old who is being evaluated via a billable telephone visit.      What phone number would you like to be contacted at? [phone]  How would you like to obtain your AVS? Chris    Distant Location (provider location):  On-site        [facility]    8/23/2023    Chief complaint:   Ongoing abdominal pain  \"I am doing sucky\"         Interval history:  Parker Acevedo is a 52 year old male is known to me for:  Visceral hyperalgesia  Chronic abdominal pain  Chronic pain syndrome  PMHx includes: ADHD, anxiety, cardiomyopathy and nutritional diseases, chronic abdominal pain, central line associated bloodstream infection recurrent, anesthesia complication, difficulty swallowing, gastric ulcer unspecified as acute or chronic without mention of hemorrhage perforation or obstruction, gastroesophageal reflux disease, head injury, hiatal hernia, other bladder disorder, other chronic pain, postop nausea and vomiting, severe malnutrition on TPN, short gut syndrome, tobacco abuse  PSHx includes: Appendectomy, back surgery (11/3/2014), biopsy of cervical lymph node (2/20/2015), cholecystectomy, colonoscopy (2021, 2022), cervical anterior 1 level discectomy and fusion (2/15/2017), endoscopic insertion tube gastrostomy (9/9/2013), endoscopic ultrasound upper gastrointestinal tract (4/29/2011), endoscopic ultrasound upper gastrointestinal tract (9/9/2013), endoscopic ultrasound upper gastrointestinal tract (2/24/2021), endoscopy (3/25/2011, 8/4/2009, 1/5/2009), multiple EGDs, gastrectomy (6/22/2012), gastrojejunostomy (8/26/2009), umbilical hernia repair (2006), hernia raphe hiatal (6/22/2012), herniorrhaphy inguinal (11/22/2013), insert PICC line on the right (12/19/2019), insert PICC line right (2/21/2020), insert vascular access port on the right (12/19/2017, 8/2/2018), " multiple interventional radiology CVC tunnel placement and removals, exploratory laparotomy (11/22/2013), orthopedic surgery, Celestino-en-Y gastric bypass (2003), tonsillectomy.        Recommendations/plan at the last visit on 5/23/2023 included:   Physical Therapy: none  Clinical Health Psychologist to address issues of relaxation, behavioral change, coping style, and other factors important to improvement: none  Continue gentle movement at home  Diagnostic Studies: none  Medication Management:   Continue fentanyl patch 25mcg/hr every 48 hours, fill 5/31 and start 6/2  Oxycodone liquid 5mg/5ml,  use 5-10mg (5-10mls) every 4 hours as needed, max of 40mg (40ml) per day. Fill 5/31 and start 6/2  Further procedures recommended: none  Recommendations/follow-up for PCP:  See above  Release of information: none  Follow up: 12 weeks for in-person visit. Please call [phone] to make your follow-up appointment with me.          Since his last visit, Parker Acevedo reports:    Interval history August 23, 2023  -he was in the hospital overnight from 8/6 to 8/7, had bacteremia  -they are having their home reroofed, re-sided and remodeled due to black mold.   -they have to move out due to the black mold and health issues while remediating   -his abdominal pain had been up when he was ill. Then got to baseline.   -his stress levels are high now due to the black mold so his stress/anxiety is high.        Interval history May 23, 2023  -he has ongoing abdominal pain. He will now feel very ill about 3-4 times per month. When he feels like this, he often needs to go to the ER and get his medications via IV.   -he was not able to complete his colonoscopy as he was not as cleaned out enough.   -discussed possible Lyrica or Cymbalta. He prefers to keep his medications the same.    Interval history February 6, 2023  -struck his head on a cupboard reaching for a glass in the kitchen and cracked his head, had to get stiches above his  eye in the forehead.   -he states he didn't have a work up for a concussion.   -he has had more headaches since striking his head.   -encouraged him to see his Primary Care Provider   -he has had nausea prior to the head injury.   -he has had nausea and vomiting that comes and goes. It is not worse now after the head injury.   -he has a lot of swelling around the laceration per patient report, I encouraged him to be seen in urgent care for evaluation.     Interval history November 9, 2022  -Parker has ongoing abdominal pain  -he relates that the recheck for Hep B was negative  -he is stable on his current regimen of fentanyl patch and oxycodone liquid    Interval history August 23, 2022  -abdominal pain, pretty bad today, ID will recheck him for Hep B and are doing some updated imaging.   -insurance adjusters at his home now for floyd issues  -left knee pain after slip and fall, seen in ER on 8/9/2022 and in immobilizer, will follow up with ortho.     Pain history collected at initial visit on 5/27/2022  He had gastric bypass in 2003 and about 5 years after that, he developed gastric ulcers. He ended up having surgery for gastric ulcer and hernias and such. He has had over 11 surgeries for his abdomen. He is malnourished due to short-gut syndrome. He has a J-tube that is open to vent bile from his stomach as he gets bloated.   Worst pain is inside the abdominal cavity. He will have pain so bad that he states he screams for his mother. He feels that this is a deep inside pain.   -he would like to increase his oxycodone dose by one dose per day. His activity is limited by not having medication to cover him for his daily activities.      -he has a DVT in his right arm and in his right jugular vein. He is on lovenox.         At this point, the patient's participation with our multidisciplinary team includes:  The patient has been compliant with the program.  PT - none  Health Psych - none      Pain scores:  Pain  "intensity on average is 3-5 on a scale of 0-10.    Range is 3-10+/10.   Pain right now is 6/10.   Pain is described as \"sharp, burning, agonizing and like on fire or like pirahnas gnawing me inside out,  Can bee a really deep, hard ache.\"    Pain is constant in nature    Current pain relevant medications:   -tylenol 32mg/ml liquid take 15.65mls (500mg) Q 4 hours PRN fever/mild pain  (somewhat helpful for headaches)  --Duragesic 25mcg/hr Q 48 hours (helpful)  -lidoderm 5% patch PRN (helpful)  -Narcan nasal spray for opiate reversal   -Oxycodone 5mg/5ml solution take 5-10mls (5-10mg) TID PRN max of 40mg/day with 4 hours between doses.      Other pertinent medications:  -Adderall 20mg every day  -LOVENOX 120mg Subcutaneously every day  -lorazepam 1mg for anxiety with TPN and meds once per day (uses most nights)  -Zofran 8mg Q 8 hours PRN     Previous medication treatments included:  OPIATES: Fentanyl (helpful), morphine (hallucinations), Dilaudid (not helpful, did not dissolve), Tylenol #3 (not helpful), hydrocodone (not helpful), Belbuca (not helpful--he had a really heavy chest feeling)  NSAIDS: cannot take due to severe gastric ulcer disease  MUSCLE RELAXANTS: none  ANTI-MIGRAINE MEDS: none  ANTI-DEPRESSANTS: none  SLEEP AIDS: benadryl (helpful)  ANTI-CONVULSANTS: gabapentin (bad headaches and acid reflux),  TOPICALS: Lidocaine patches (helpful)  ANXIOLYTICS: valium (helpful 5mg dosage), lorazepam (helpful)  MEDICAL CANNABIS: not interested  Other meds: tylenol (helpful for headaches)        Other treatments have included:  Parker Acevedo has been seen at a pain clinic in the past.  Previously managed here by Dr. Jesscia Milligan. Also [facility] by [facility] for possible implanted intrathecal pain pump, he is not candidate due to infection risk.   PT: tried, never helped his neck or abdominal surgery  Massage Therapy: helpful for a day or two  Chiropractic care: tried, helped some   Acupuncture: tried, not helpful  TENs Unit: " "tried, not helpful  Healing Touch: not helpful     Injections:   -previously had injections for his neck with Dr. Jessica Milligan        THE 4 A's OF OPIOID MAINTENANCE ANALGESIA    Analgesia: keeps pain pretty manageable    Activity: he walks daily, light housekeeping    Adverse effects: none    Adherence to Rx protocol: yes      Side Effects: no side effect  Patient is using the medication as prescribed: YES    Medications:  Current Outpatient Medications   Medication Sig Dispense Refill    albuterol (VENTOLIN HFA) 108 (90 Base) MCG/ACT inhaler Inhale 2 puffs into the lungs every 6 hours as needed 18 g 1    ALPRAZolam (XANAX) 0.5 MG tablet Take 1 tablet (0.5 mg) by mouth 2 times daily as needed for sleep or anxiety 60 tablet 0    amphetamine-dextroamphetamine (ADDERALL) 20 MG tablet TAKE ONE TABLET BY MOUTH ONCE DAILY 30 tablet 0    carvedilol (COREG) 6.25 MG tablet TAKE 2 TABLETS (12.5 MG) BY MOUTH 2 TIMES DAILY WITH MEALS 60 tablet 3    cyanocobalamin (CYANOCOBALAMIN) 1000 MCG/ML injection INJECT 1 ML INTO THE MUSCLE EVERY 30 DAYS 1 mL 3    enoxaparin ANTICOAGULANT (LOVENOX) 80 MG/0.8ML syringe INJECT THE CONTENTS OF ONE SYRINGE (80MG) UNDER THE SKIN TWO TIMES A DAY 67.2 mL 0    [facility] INFUSION MANAGED PATIENT Contact [facility] for patient specific medication information at [phone] on admission and discharge from the hospital.  Phones are answered 24 hours a day 7 days a week 365 days a year.    Providers - Choose \"CONTINUE HOME MED (no script)\" at discharge if patient treatment with home infusion will continue.      fentaNYL (DURAGESIC) 25 mcg/hr 72 hr patch Place 1 patch onto the skin every 48 hours Fill 07/30/23 and start 08/01/23. 30 day supply for chronic pain. 15 patch 0    lidocaine (LIDODERM) 5 % patch Place 1-2 patches onto the skin every 24 hours Wear for 12 hours, remove for 12 hours.  OK to cut to better fit to size. 60 patch 11    LORazepam (ATIVAN) 1 MG tablet Take 1 tablet " "(1 mg) by mouth At Bedtime 30 tablet 0    naloxone (NARCAN) 4 MG/0.1ML nasal spray Spray 1 spray (4 mg) into one nostril alternating nostrils once as needed for opioid reversal every 2-3 minutes until assistance arrives 0.2 mL 0    nystatin (MYCOSTATIN) 099274 UNIT/GM external cream APPLY TOPICALLY 2 TIMES DAILY 30 g 1    ondansetron (ZOFRAN ODT) 8 MG ODT tab DISSOLVE ONE TABLET ON TONGUE EVERY 8 HOURS AS NEEDED FOR NAUSEA 90 tablet 1    oxyCODONE (ROXICODONE) 5 MG/5ML solution Take 5-10 mLs (5-10 mg) by mouth every 4 hours as needed for moderate to severe pain Max of 40mg/day. Fill 07/30/23 and start 08/01/23. 30 day supply for chronic pain. 1200 mL 0    polyethylene glycol (MIRALAX) 17 GM/Dose powder Take 17 g by mouth daily 1020 g 3    sodium chloride 4.64 mL with gentamicin 12.4 mg, heparin (porcine) 50 Units for Dialysis Catheter Care gentamicin (GARAMYCIN) 2.5 mg/mL, heparin (porcine) 10 Units/mL in sodium chloride 0.9 % 5 mL Antimicrobial Catheter Lock Therapy    Dwell in lumen #1 when TPN is not running. 1 each 0    sodium chloride 4.64 mL with gentamicin 12.4 mg, heparin (porcine) 50 Units for Dialysis Catheter Care gentamicin (GARAMYCIN) 2.5 mg/mL, heparin (porcine) 10 Units/mL in sodium chloride 0.9 % 5 mL Antimicrobial Catheter Lock Therapy    Dwell in lumen #2 when TPN is not running. 1 each 0    sucralfate (CARAFATE) 1 GM/10ML suspension TAKE 10MLS  BY MOUTH FOUR TIMES A DAY AS NEEDED 420 mL 1    Syringe/Needle, Disp, (B-D ECLIPSE SYRINGE) 27G X 1/2\" 1 ML MISC 1 Device every 30 days 30 each 1    vitamin D2 (ERGOCALCIFEROL) 03074 units (1250 mcg) capsule Take 1 capsule (50,000 Units) by mouth once a week 12 capsule 3       Medical History: any changes in medical history since they were last seen? No    Social History:   Home situation:  to Vandana, one son in late 20's   Occupation/Schooling: he used to work at [employer]. He is on disability, SSDI  Tobacco use: smokes 1/2 ppd, discussed " smoking cessation today, he is not ready at present time  Alcohol use: none  Drug use: none  History of chemical dependency treatment: none    Is patient a current smoker or tobacco user?  Down to 3-4 cigarettes per day  If yes, was cessation counseling offered?  Yes          Physical Exam:  Vital signs: There were no vitals taken for this visit.    Behavioral observations:  Awake, alert. Cooperative.   Pulm: respirations easy and unlabored. Able to speak in full sentences without SOB or cough noted.                 Diagnostic tests:  CT ABDOMEN PELVIS W CONTRAST  2/16/2019 3:00 AM      HISTORY: Right-sided abdominal pain, status post gastric bypass.  Patient has G-tube with blood and history of ulcers.     TECHNIQUE: CT abdomen and pelvis with 85 mL Isovue-370 IV. Radiation  dose for this scan was reduced using automated exposure control,  adjustment of the mA and/or kV according to patient size, or iterative  reconstruction technique.     COMPARISON: 1/13/2015.     FINDINGS:  Abdomen: There is mild dependent atelectasis at the lung bases. The  heart size is normal. Small hiatal hernia. There is mild biliary  dilatation into the pancreas head which is probably normal post  cholecystectomy. The liver otherwise appears normal. The gallbladder  is absent. The spleen, pancreas, adrenal glands and kidneys are normal  in appearance. There is a G-tube in place. No abdominal or pelvic  lymph node enlargement.     Pelvis: The ascending colon and transverse colon are very distended  with gas, stool and fluid. The descending and rectosigmoid colon are  nondilated. Transition point is in the region of the splenic flexure,  but there is no convincing obstruction. Small bowel is normal in  caliber. The appendix is normal. There is no free intraperitoneal gas  or fluid.                                                                      IMPRESSION:  1. The ascending and transverse colon are distended with gas, stool  and fluid.  Distal colon is nondilated. No focal obstruction is  evident.  2. Small hiatal hernia.     IMANI HU MD        MR CERVICAL SPINE WITHOUT CONTRAST  1/2/2017  2:44 PM     HISTORY: Injury to neck 2 weeks ago with development of suspected  radicular pain to the left upper extremity with weakness of thumb and  index finger.     COMPARISON: Plain films 12/22/2016.     TECHNIQUE: Routine MR cervical spine extended through T2.     FINDINGS: Vertebral body bone marrow signal is normal and the  alignment is normal through T2. No malignant or destructive changes.  The cervical and upper thoracic spinal cord appear intrinsically  normal through T2. Craniocervical junction region is normal. No  paraspinous soft tissue abnormality.     Findings by level as follows:     C2-C3: Negative. No disc protrusion. No central or lateral stenosis.     C3-C4: Negative.     C4-C5: Very tiny central disc protrusion. No significant central or  lateral stenosis.     C5-C6: Moderate degenerative narrowing of the interspace. Mild  broad-based disc osteophyte complex and small uncinate spurs. Minimal  central stenosis and minimal bilateral foraminal stenosis.     C6-C7: Moderate degenerative narrowing of the interspace. No disc  protrusion. No central or lateral stenosis.     C7-T1: Negative.                                                                      IMPRESSION:  1. Degenerative changes as described, most marked at C5-C6 and C6-C7.  2. No intrinsic cord abnormality through T2.     MARTHA MCCARTY MD           MR LUMBAR SPINE W/O & W CONTRAST 1/6/2019 3:49 PM     Provided History: Back pain, > 6wks conservative tx, persistent sx;  pain in the left paraspinal region- now with fungemia, persistent  blood stream infections since Oct 2018.     Comparison: No similar studies     Technique: Sagittal T1-weighted and T2-weighted and axial T2-weighted  images of the lumbar spine were obtained without intravenous contrast.  Post intravenous  contrast using gadolinium axial and sagittal  T1-weighted images were obtained with fat saturation.     Contrast: 8.9CC GADAVIST     Findings: Regarding numbering convention, there are 5 lumbar-type  vertebrae assumed for the purposes of this dictation.  The tip of the  conus medullaris is at L1.  Regarding alignment, the lumbar vertebral  column appears normally aligned.  There is no significant disc height  narrowing at any level.  Regarding bone marrow signal intensity, no  abnormality is visualized on STIR images.     On a level by level basis:     L1-2: No significant spinal canal or foraminal narrowing.     L2-3: Minimal disc bulge. Mild thickening of the ligamentum flavum. No  significant spinal canal or foraminal narrowing.     L3-4: Mild disc bulge and thickening of the ligamentum flavum with  mild spinal canal narrowing. No neural foraminal narrowing.     L4-5: Mild disc bulge and thickening of the ligamentum flavum. No  central spinal canal narrowing or neuroforaminal stenosis.      L5-S1: No significant spinal canal or foraminal narrowing.     3 bandlike areas of high STIR signal and enhancement in the right  posterior paraspinous muscles.                                                                      Impression:  1. No abnormal signal within the spine to suggest fungal infection.  Mild nonspecific enhancement and STIR hyperintensity in the right  posterior paraspinous muscles, potentially myositis.  2. Multilevel lumbar spondylosis.     I have personally reviewed the examination and initial interpretation  and I agree with the findings.     YOLA TELLEZ MD           Other testing (labs, diagnostics):  5/25/2022  Cr. 0.69  Est GFR >90        Screening tools:      DIRE Score for ongoing opioid management is calculated as follows:     Diagnosis = 2     Intractability = 2     Risk: Psych = 3  Chem Hlth = 2  Reliability = 2  Social = 2     Efficacy = 2     Total DIRE Score = 15 (14 or higher predicts  good candidate for ongoing opioid management; 13 or lower predicts poor candidate for opioid management)         [place] Prescription Monitoring Program:  Reviewed [place]  8/23/2023- no concerning fills.  Natalie MENARD, RN CNP, FNP  [facility] location          Assessment:   Visceral hyperalgesia  Chronic abdominal pain  Cervicalgia  Chronic bilateral low back pain without sciatica  Chronic pain syndrome  Chronic continuous use of opioids  Long term use of opiate analgesic (current use)    CSA 11/11/2022  UDT 11/9/2022  Long term current use of opiate analgesic  PMHx includes: ADHD, anxiety, cardiomyopathy and nutritional diseases, chronic abdominal pain, central line associated bloodstream infection recurrent, anesthesia complication, difficulty swallowing, gastric ulcer unspecified as acute or chronic without mention of hemorrhage perforation or obstruction, gastroesophageal reflux disease, head injury, hiatal hernia, other bladder disorder, other chronic pain, postop nausea and vomiting, severe malnutrition on TPN, short gut syndrome, tobacco abuse  PSHx includes: Appendectomy, back surgery (11/3/2014), biopsy of cervical lymph node (2/20/2015), cholecystectomy, colonoscopy (2021, 2022), cervical anterior 1 level discectomy and fusion (2/15/2017), endoscopic insertion tube gastrostomy (9/9/2013), endoscopic ultrasound upper gastrointestinal tract (4/29/2011), endoscopic ultrasound upper gastrointestinal tract (9/9/2013), endoscopic ultrasound upper gastrointestinal tract (2/24/2021), endoscopy (3/25/2011, 8/4/2009, 1/5/2009), multiple EGDs, gastrectomy (6/22/2012), gastrojejunostomy (8/26/2009), umbilical hernia repair (2006), hernia raphe hiatal (6/22/2012), herniorrhaphy inguinal (11/22/2013), insert PICC line on the right (12/19/2019), insert PICC line right (2/21/2020), insert vascular access port on the right (12/19/2017, 8/2/2018), multiple interventional  radiology CVC tunnel placement and removals, exploratory laparotomy (11/22/2013), orthopedic surgery, Celestino-en-Y gastric bypass (2003), tonsillectomy.        Plan:   Physical Therapy: none  Clinical Health Psychologist to address issues of relaxation, behavioral change, coping style, and other factors important to improvement: none  Continue gentle movement at home  Diagnostic Studies: none  Medication Management:   Continue fentanyl patch 25mcg/hr every 48 hours, fill 8/29 and start 8/31  Oxycodone liquid 5mg/5ml,  use 5-10mg (5-10mls) every 4 hours as needed, max of 40mg (40ml) per day. Fill 8/29 and start 8/31  Further procedures recommended: none  Recommendations/follow-up for PCP:  See above  Release of information: none  Follow up: 12 weeks for in-person visit. Please call [phone] to make your follow-up appointment with me.       ASSESSMENT AND PLAN:  (R19.8) Visceral hyperalgesia  Plan: fentaNYL (DURAGESIC) 25 mcg/hr 72 hr patch,         oxyCODONE (ROXICODONE) 5 MG/5ML solution, Adult        Pain Clinic Follow-Up Order            (R10.9,  G89.29) Chronic abdominal pain  Plan: fentaNYL (DURAGESIC) 25 mcg/hr 72 hr patch,         oxyCODONE (ROXICODONE) 5 MG/5ML solution, Adult        Pain Clinic Follow-Up Order            (M54.2) Cervicalgia  Plan: fentaNYL (DURAGESIC) 25 mcg/hr 72 hr patch,         oxyCODONE (ROXICODONE) 5 MG/5ML solution, Adult        Pain Clinic Follow-Up Order            (M54.50,  G89.29) Chronic bilateral low back pain without sciatica  Plan: fentaNYL (DURAGESIC) 25 mcg/hr 72 hr patch,         oxyCODONE (ROXICODONE) 5 MG/5ML solution, Adult        Pain Clinic Follow-Up Order            (G89.4) Chronic pain syndrome  Plan: fentaNYL (DURAGESIC) 25 mcg/hr 72 hr patch,         oxyCODONE (ROXICODONE) 5 MG/5ML solution, Adult        Pain Clinic Follow-Up Order            (F11.90) Chronic, continuous use of opioids  Plan: fentaNYL (DURAGESIC) 25 mcg/hr 72 hr patch,         oxyCODONE  (ROXICODONE) 5 MG/5ML solution, Adult        Pain Clinic Follow-Up Order            (Z79.891) Long term (current) use of opiate analgesic  Plan: naloxone (NARCAN) 4 MG/0.1ML nasal spray, Adult        Pain Clinic Follow-Up Order                 Phone call duration: 20 minutes        Natalie MENARD RN CNP, FNP  [facility]  [facility]

## 2023-08-23 NOTE — PATIENT INSTRUCTIONS
Plan:   Physical Therapy: none  Clinical Health Psychologist to address issues of relaxation, behavioral change, coping style, and other factors important to improvement: none  Continue gentle movement at home  Diagnostic Studies: none  Medication Management:   Continue fentanyl patch 25mcg/hr every 48 hours, fill 8/29 and start 8/31  Oxycodone liquid 5mg/5ml,  use 5-10mg (5-10mls) every 4 hours as needed, max of 40mg (40ml) per day. Fill 8/29 and start 8/31  Further procedures recommended: none  Recommendations/follow-up for PCP:  See above  Release of information: none  Follow up: 12 weeks for in-person visit. Please call [phone] to make your follow-up appointment with me.     ----------------    Clinic Number:  [phone]   Call with any questions about your care and for scheduling assistance.   Calls are returned Monday through Friday between 8 AM and 4:30 PM. We usually get back to you within 2 business days depending on the issue/request.    If we are prescribing your medications:  For opioid medication refills, call the clinic or send a [employer] message 7 days in advance.  Please include:  Name of requested medication  Name of the pharmacy.  For non-opioid medications, call your pharmacy directly to request a refill. Please allow 3-4 days to be processed.   Per [place] State Law:  All controlled substance prescriptions must be filled within 30 days of being written.    For those controlled substances allowing refills, pickup must occur within 30 days of last fill.      We believe regular attendance is key to your success in our program!    Any time you are unable to keep your appointment we ask that you call us at least 24 hours in advance to cancel.This will allow us to offer the appointment time to another patient.   Multiple missed appointments may lead to dismissal from the clinic.

## 2023-08-23 NOTE — PROGRESS NOTES
08/23/23 1054   PEG: A Thee-Item Scale Assessing Pain Intensity and Interference        0 = No pain / No interference    10 = Pain as bad as you can imagine / Completely interferes   What number best describes your pain on average in the past week? 6   What number best describes how, during the past week, pain has interfered with your enjoyment of life? 5   What number best describes how, during the past week, pain has interfered with your general activity? 5   PEG Total Score 5.33

## 2023-08-24 DIAGNOSIS — F90.0 ADHD (ATTENTION DEFICIT HYPERACTIVITY DISORDER), INATTENTIVE TYPE: ICD-10-CM

## 2023-08-25 RX ORDER — DEXTROAMPHETAMINE SACCHARATE, AMPHETAMINE ASPARTATE, DEXTROAMPHETAMINE SULFATE AND AMPHETAMINE SULFATE 5; 5; 5; 5 MG/1; MG/1; MG/1; MG/1
TABLET ORAL
Qty: 30 TABLET | Refills: 0 | Status: SHIPPED | OUTPATIENT
Start: 2023-08-25 | End: 2023-10-03

## 2023-09-05 ENCOUNTER — HOSPITAL ENCOUNTER (OUTPATIENT)
Dept: GENERAL RADIOLOGY | Facility: CLINIC | Age: 53
Discharge: HOME | End: 2023-09-05
Attending: INTERNAL MEDICINE
Payer: COMMERCIAL

## 2023-09-05 ENCOUNTER — OFFICE VISIT (OUTPATIENT)
Dept: INTERNAL MEDICINE | Facility: CLINIC | Age: 53
End: 2023-09-05
Payer: COMMERCIAL

## 2023-09-05 ENCOUNTER — HOSPITAL ENCOUNTER (OUTPATIENT)
Dept: ULTRASOUND IMAGING | Facility: CLINIC | Age: 53
Discharge: HOME | End: 2023-09-05
Attending: INTERNAL MEDICINE
Payer: COMMERCIAL

## 2023-09-05 VITALS
RESPIRATION RATE: 16 BRPM | HEIGHT: 71 IN | WEIGHT: 198 LBS | HEART RATE: 62 BPM | OXYGEN SATURATION: 95 % | BODY MASS INDEX: 27.72 KG/M2 | SYSTOLIC BLOOD PRESSURE: 138 MMHG | TEMPERATURE: 98.8 F | DIASTOLIC BLOOD PRESSURE: 86 MMHG

## 2023-09-05 DIAGNOSIS — R78.81 POSITIVE BLOOD CULTURE: ICD-10-CM

## 2023-09-05 DIAGNOSIS — Z98.84 GASTRIC BYPASS STATUS FOR OBESITY: ICD-10-CM

## 2023-09-05 DIAGNOSIS — F90.0 ADHD (ATTENTION DEFICIT HYPERACTIVITY DISORDER), INATTENTIVE TYPE: ICD-10-CM

## 2023-09-05 DIAGNOSIS — M79.89 PAIN AND SWELLING OF RIGHT FOREARM: ICD-10-CM

## 2023-09-05 DIAGNOSIS — R07.89 CHEST WALL PAIN: ICD-10-CM

## 2023-09-05 DIAGNOSIS — R07.89 CHEST WALL PAIN: Primary | ICD-10-CM

## 2023-09-05 DIAGNOSIS — M79.631 PAIN AND SWELLING OF RIGHT FOREARM: ICD-10-CM

## 2023-09-05 PROCEDURE — 71101 X-RAY EXAM UNILAT RIBS/CHEST: CPT | Mod: RT

## 2023-09-05 PROCEDURE — 99214 OFFICE O/P EST MOD 30 MIN: CPT | Performed by: INTERNAL MEDICINE

## 2023-09-05 PROCEDURE — 93971 EXTREMITY STUDY: CPT | Mod: RT

## 2023-09-05 ASSESSMENT — ANXIETY QUESTIONNAIRES
4. TROUBLE RELAXING: SEVERAL DAYS
IF YOU CHECKED OFF ANY PROBLEMS ON THIS QUESTIONNAIRE, HOW DIFFICULT HAVE THESE PROBLEMS MADE IT FOR YOU TO DO YOUR WORK, TAKE CARE OF THINGS AT HOME, OR GET ALONG WITH OTHER PEOPLE: SOMEWHAT DIFFICULT
GAD7 TOTAL SCORE: 1
GAD7 TOTAL SCORE: 1
2. NOT BEING ABLE TO STOP OR CONTROL WORRYING: NOT AT ALL
3. WORRYING TOO MUCH ABOUT DIFFERENT THINGS: NOT AT ALL
1. FEELING NERVOUS, ANXIOUS, OR ON EDGE: NOT AT ALL
6. BECOMING EASILY ANNOYED OR IRRITABLE: NOT AT ALL
7. FEELING AFRAID AS IF SOMETHING AWFUL MIGHT HAPPEN: NOT AT ALL
5. BEING SO RESTLESS THAT IT IS HARD TO SIT STILL: NOT AT ALL

## 2023-09-05 NOTE — PROGRESS NOTES
Assessment & Plan     Chest wall pain  Chest wall pain on the right side possibly from falling on something.  We will check an x-ray for this.  He certainly is at risk for osteoporosis with his TPN and the lack of calcium intake or absorption.  - XR Ribs & Chest Right G/E 3 Views; Future    Pain and swelling of right forearm  Pain and swelling of his right arm he has a history of clots.  He is currently has no line in the right side but it is definitely swollen compared to the left.  He is on Lovenox 80 mg twice a day and says he has not missed a dose but we will start off with a ultrasound.  Could be from positional and would recommend he elevate the arm.  Watch for any signs of infection with these faint redness in the arm.  - US Upper Extremity Venous Duplex Right; Future    ADHD (attention deficit hyperactivity disorder), inattentive type  Attention deficit hyperactivity disorder he is on Adderall takes that daily.    Gastric bypass status for obesity  Gastric bypass status he is on TPN difficult to absorb or tolerate food.  He has not had a bowel movement in 3 weeks recommended he take MiraLAX and try an enema to get his bowels moving.  His weight is up.  He has a colonoscopy coming up with his GI doctor in September.    Positive blood culture  Positive blood culture he was hospitalized decided not to have his line removed infectious disease treated him with 4 weeks of daptomycin.  Hopefully the line is now clean.  Recommended he have follow-up with infectious disease but he is not interested in this.      Ultrasound of the right arm was negative for DVT.  The patient will go home elevate the arm the swelling has improved some.  Continue Lovenox and I think this will improve.  Probably from trauma.         Nicotine/Tobacco Cessation:  He reports that he has been smoking cigarettes. He has a 1.50 pack-year smoking history. He has never used smokeless tobacco.  Nicotine/Tobacco Cessation Plan:   Information  "offered: Patient not interested at this time      BMI:   Estimated body mass index is 27.62 kg/m  as calculated from the following:    Height as of this encounter: 1.803 m (5' 11\").    Weight as of this encounter: 89.8 kg (198 lb).           Chuy Isaac MD  [facility] MASHA Canales is a 52 year old, presenting for the following health issues:  Recheck Medication and Edema (Swollen right arm)      9/5/2023     3:09 PM   Additional Questions   Roomed by Jael Mir       History of Present Illness       Reason for visit:  Have a swallowen left arm    He eats 0-1 servings of fruits and vegetables daily.He consumes 1 sweetened beverage(s) daily.He exercises with enough effort to increase his heart rate 9 or less minutes per day.  He exercises with enough effort to increase his heart rate 3 or less days per week.   He is taking medications regularly.       Right arm swelling for two days, maybe better today, tender and hard to move it.  Mild erythema.  No lines on the right side, left side azevedo. Chronic IV hydration.     Off daptomycin.  Never saw ID again.      Pain with cough in his chest.  Right ribs are sore.  Worse  with bending over.      Pain is followed by pain clinic.      Colonoscopy with Dr Estrada for screening, but hard to clean out.     Adderall daily.      Lovenox is 80 twice a day, no missed doses.       Review of Systems         Objective    /86   Pulse 62   Temp 98.8  F (37.1  C) (Temporal)   Resp 16   Ht 1.803 m (5' 11\")   Wt 89.8 kg (198 lb)   SpO2 95%   BMI 27.62 kg/m    Body mass index is 27.62 kg/m .  Physical Exam   No acute distress, accompanied by his wife.  Heart is regular  Lungs are clear  Right anterior chest is tender under the pectoralis muscle  Right arm is swollen, tender around the elbow with faint erythema.                      "

## 2023-09-05 NOTE — PROGRESS NOTES
Appropriate assistive devices provided during their visit. na (Yes, No, N/A) na (list device)    Exam table and/or cart  placed in the lowest position. na (Yes, No, N/A)    Brakes on tables/carts/wheelchairs used at all times. na (Yes, No, N/A)    Non slip footwear applied. na (Yes, No, NA)    Patient was accompanied by staff throughout visit. yes (Yes, No, N/A)    Equipment safety straps used. na (Yes, No, N/A)    Assist with toileting. na (Yes, No, N/A)

## 2023-09-06 ENCOUNTER — TELEPHONE (OUTPATIENT)
Dept: GASTROENTEROLOGY | Facility: CLINIC | Age: 53
End: 2023-09-06

## 2023-09-06 DIAGNOSIS — R78.81 GRAM-POSITIVE BACTEREMIA: Primary | ICD-10-CM

## 2023-09-06 RX ORDER — BISACODYL 5 MG/1
TABLET, DELAYED RELEASE ORAL
Qty: 4 TABLET | Refills: 0 | Status: ON HOLD | OUTPATIENT
Start: 2023-09-06 | End: 2023-11-14

## 2023-09-06 RX ORDER — ONDANSETRON 4 MG/1
TABLET, FILM COATED ORAL
Qty: 3 TABLET | Refills: 0 | Status: ON HOLD | OUTPATIENT
Start: 2023-09-06 | End: 2023-11-14

## 2023-09-06 NOTE — TELEPHONE ENCOUNTER
"Response from Dr. Estrada:  Jimbo Estrada MD Eisenschenk, Kristen, RN; Alicia Dai RN     \"I recommend definitely using the G tube if he has it. If he feels nauseated we can send him zofran. He really just needs to do the FULL extended prep in the recommended time frame\"     -------------------------------------------------------        Pre assessment completed for upcoming procedure.      Procedure details:    Patient scheduled for Colonoscopy  on 9/19/23.     Arrival time: 0845. Procedure time 0945    Pre op exam needed? N/A    [facility] [address] [place] [address], 5th Floor, [place] [address]    Sedation type: MAC    Indication for procedure: : Gram-positive bacteremia     COVID policy reviewed.    Designated  policy reviewed. Instructed to have someone stay 24 hours post procedure.       Chart review:     Electronic implanted devices? No    Diabetic? No    Medication review:    Anticoagulants? Yes Enoxaparin (Lovenox): Recommended HOLD 24 hours before procedure.  Consult with your managing provider.    NSAIDS? No    Other medication HOLDING recommendations:  N/A      Prep for procedure:     Bowel prep recommendation: Extended Golytely   Due to: chronic pain medication noted in chart.  and poor prep/inadequate bowel prep in the past.     Prep instructions sent via [employer] Bowel prep script sent to    [address]    Reviewed procedure prep instructions.     Rose, wife verbalized understanding and had no questions or concerns at this time.        Jyothi Holloway RN  Endoscopy Procedure Pre Assessment RN  [phone] option 4        "

## 2023-09-07 ENCOUNTER — TELEPHONE (OUTPATIENT)
Dept: GASTROENTEROLOGY | Facility: CLINIC | Age: 53
End: 2023-09-07
Payer: COMMERCIAL

## 2023-09-07 DIAGNOSIS — R78.81 GRAM-POSITIVE BACTEREMIA: Primary | ICD-10-CM

## 2023-09-07 NOTE — TELEPHONE ENCOUNTER
----- Message from Jyothi Holloway RN sent at 2023  2:39 PM CDT -----  Regarding: Colonoscopy order   Patient is scheduled for a Colonoscopy   Date of procedure: 23  Indication: : Gram-positive bacteremia  Sedation type: MAC    Upon review order for colonoscopy has . Writer sending as an FYI in case a new order is required in order to complete procedure.       Thank you,   Jyothi Holloway RN  Endoscopy Procedure Pre Assessment RN  [phone] option 4

## 2023-09-11 ENCOUNTER — TELEPHONE (OUTPATIENT)
Dept: INTERNAL MEDICINE | Facility: CLINIC | Age: 53
End: 2023-09-11
Payer: COMMERCIAL

## 2023-09-11 ENCOUNTER — PATIENT OUTREACH (OUTPATIENT)
Dept: CARE COORDINATION | Facility: CLINIC | Age: 53
End: 2023-09-11
Payer: COMMERCIAL

## 2023-09-11 ASSESSMENT — ACTIVITIES OF DAILY LIVING (ADL): DEPENDENT_IADLS:: TRANSPORTATION

## 2023-09-11 NOTE — LETTER
[facility]  Patient Centered Plan of Care  About Me:        Patient Name:  Parker Acevedo    YOB: 1970  Age:         52 year old   [facility] MRN:    [id number] Telephone Information:  Home Phone [phone]   Mobile [phone]       Address:  [address] [address] [place] [address] [place] [address] Email address:  [email or web address]      Emergency Contact(s)    Name Relationship Lgl Grd Work Phone Home Phone Mobile Phone   1. NAHUM RAMOS* Spouse No  [phone]   2. JEYSON CAIN Sister-in-Law No  [phone]   3. MARELY ACEVEDO* Mother No  [phone]            Primary language:  English     needed? No   [employer]:  [phone] op. 1  Other communication barriers:Glasses; Physical impairment    Preferred Method of Communication:  MyChart  Current living arrangement: I live in a private home with spouse    Mobility Status/ Medical Equipment: Independent        Health Maintenance  Health Maintenance Reviewed: Due/Overdue   Health Maintenance Due   Topic Date Due    SPIROMETRY  Never done    COPD ACTION PLAN  Never done    HEPATITIS B IMMUNIZATION (1 of 3 - 3-dose series) Never done    MEDICARE ANNUAL WELLNESS VISIT  06/21/2017    COVID-19 Vaccine (3 - Moderna series) 10/04/2021    COLORECTAL CANCER SCREENING  10/13/2022    INFLUENZA VACCINE (1) 09/01/2023    ZOSTER IMMUNIZATION (1 of 2) 11/06/2022    ADVANCE CARE PLANNING  10/11/2023           My Access Plan  Medical Emergency 911   Primary Clinic Line [facility]* - [phone]   24 Hour Appointment Line [phone] or  [phone]) (toll-free)   24 Hour Nurse Line [phone] (toll-free)   Preferred Urgent Care Other     Preferred Hospital [facility]  [phone]     Preferred Pharmacy [facility] [place], [place] - [address] [facility]     Behavioral Health Crisis Line The National Suicide  Prevention Lifeline at 1-673.604.2186 or Text/Call 988             My Care Team Members  Patient Care Team         Relationship Specialty Notifications Start End    Chuy Isaac MD PCP - General Internal Medicine  7/3/23     Phone: [phone] Fax: [phone]         [address] [facility] [address]    Patti Milligan MD (Inactive) MD Pain Clinic  6/2/15     Phone: [phone] Fax: [phone]         [address] [facility] [address] [address]    Chuy Isaac MD Assigned PCP   11/11/18     Phone: [phone] Fax: [phone]         [address] [place] [address]    Marlyn Jenkins MD MD Ophthalmology  1/29/19     Phone: [phone] Fax: [phone]         [address] [address]    Marlyn Jenkins MD MD Ophthalmology  2/11/19     Phone: [phone] Fax: [phone]         [address] [address]    Kinsey Muhammad RN Lead Care Coordinator  Admissions 5/20/22     Natalie Hull APRN CNP Assigned Neuroscience Provider   6/11/22     Phone: [phone] Fax: [phone] [address] [place] [address]    Beatriz Hancock, RN Registered Nurse   7/16/22     Eduardo Butler PA-C Assigned Musculoskeletal Provider   10/1/22     Phone: [phone] Fax: [phone]         [address] [facility] [address]    Sandra Serrano MD Assigned Infectious Disease Provider   12/31/22     Phone: [phone] Fax: [phone]         [address] [facility] [address]    Natalie Hull APRN CNP Assigned Pain Medication Provider   1/9/23     Phone: [phone] Fax: [phone] [address] [facility] [address]    Eduardo Maynard MD MD Cardiovascular Disease  1/31/23     Phone: [phone] Fax: [phone]         [address] [facility] [address]    Arabella Vines MD MD Cardiovascular Disease  5/17/23     Phone: [phone] Pager:  [phone] Fax: [phone] [address] [facility] [address] [facility] [address]    Francis Vyas MD Surgeon Surgery  6/21/23     Phone: [phone] Fax: [phone]         [address] [facility] [address] [facility] [address]    Eduardo Maynard MD MD Cardiovascular Disease  7/24/23     Phone: [phone] Fax: [phone] [address] [place] [address]              My Care Plans  Self Management and Treatment Plan  Care Plan  Care Plan: Annual Wellness       Problem: Appointment       Goal: I will complete my annual wellness visit.       Start Date: 5/20/2022 Expected End Date: 10/2/2022    This Visit's Progress: 20% Recent Progress: 10%    Note:     Barriers: complex care, and high volume of appointments at baseline  Strengths: wife is pts main caregiver, and organizes his appointments.   Patient expressed understanding of goal: yes  Action steps to achieve this goal:  1. I will schedule my annual wellness visit; [phone] ([phone])  2. I will attend my annual wellness visit.  3. I will contact my Care Management or clinic team if I have barriers to attending my annual wellness visit.                                 Action Plans on File:                       Advance Care Plans/Directives Type:   Advanced Directive - On File      My Medical and Care Information  Problem List   Patient Active Problem List   Diagnosis    Peptic ulcer disease    Gastric bypass status for obesity    Chronic abdominal pain    Vomiting    Anemia    ADHD (attention deficit hyperactivity disorder), inattentive type    Vitamin B12 deficiency without anemia    Thiamine deficiency    Dehydration    Dysphagia    Weight loss, non-intentional    Malnutrition (H)    Chronic anxiety    Constipation    Bile reflux esophagitis    Former smoker    Vitamin D deficiency    Iron deficiency    Health Care Home    Chronic pain    Insomnia    Positive blood culture    Fungemia    Chronic nausea    Short gut syndrome     Anxiety    Status post cervical spinal arthrodesis    Port or reservoir infection, initial encounter    Abnormal echocardiogram    Low serum iron    Anemia, iron deficiency    Catheter-related bloodstream infection (CRBSI)    Generalized weakness    S/P bariatric surgery    Hyperlipidemia LDL goal <130    Bacteremia    Infection by Candida species    PICC line infiltration, sequela    Gram-positive bacteremia    On total parenteral nutrition    Gastric outlet obstruction    Short bowel syndrome    Bloodstream infection associated with central venous catheter, initial encounter    Fever, unknown origin    Acute deep vein thrombosis (DVT) of axillary vein of right upper extremity (H)    Bacteremia associated with intravascular line, initial encounter (H)    Gram-negative bacteremia    Cellulitis of left upper extremity    Acute deep vein thrombosis (DVT) of axillary vein of left upper extremity (H)        Current Medications and Allergies:    Allergies   Allergen Reactions    Bactrim [Sulfamethoxazole-Trimethoprim] Rash    Penicillins Anaphylaxis     Please see Antimicrobial Management Team allergy assessment note 10/10/2018. Patient reported tolerating amoxicillin.  Tolerating cefepime and ceftriaxone without reaction 6/23    Ertapenem Nausea and Vomiting    Doxycycline Rash    Vancomycin Rash     Rash after receiving vancomycin 3/28/16 (infusion reaction?). Tolerated with slower infusion and diphenhydramine premed.  Tolerated 1250mg over 90minutes 7/2023.     Current Outpatient Medications   Medication    albuterol (VENTOLIN HFA) 108 (90 Base) MCG/ACT inhaler    ALPRAZolam (XANAX) 0.5 MG tablet    amphetamine-dextroamphetamine (ADDERALL) 20 MG tablet    bisacodyl (DULCOLAX) 5 MG EC tablet    carvedilol (COREG) 6.25 MG tablet    cyanocobalamin (CYANOCOBALAMIN) 1000 MCG/ML injection    enoxaparin ANTICOAGULANT (LOVENOX) 80 MG/0.8ML syringe    [facility] INFUSION MANAGED PATIENT    fentaNYL (DURAGESIC) 25 mcg/hr  "72 hr patch    naloxone (NARCAN) 4 MG/0.1ML nasal spray    nystatin (MYCOSTATIN) 734187 UNIT/GM external cream    ondansetron (ZOFRAN ODT) 8 MG ODT tab    ondansetron (ZOFRAN) 4 MG tablet    oxyCODONE (ROXICODONE) 5 MG/5ML solution    polyethylene glycol (GOLYTELY) 236 g suspension    polyethylene glycol (MIRALAX) 17 GM/Dose powder    sodium chloride 4.64 mL with gentamicin 12.4 mg, heparin (porcine) 50 Units for Dialysis Catheter Care    sodium chloride 4.64 mL with gentamicin 12.4 mg, heparin (porcine) 50 Units for Dialysis Catheter Care    sucralfate (CARAFATE) 1 GM/10ML suspension    Syringe/Needle, Disp, (B-D ECLIPSE SYRINGE) 27G X 1/2\" 1 ML MISC    vitamin D2 (ERGOCALCIFEROL) 03482 units (1250 mcg) capsule     No current facility-administered medications for this visit.           Care Coordination Start Date: 5/20/2022   Frequency of Care Coordination: monthly     Form Last Updated: 09/11/2023       "

## 2023-09-11 NOTE — PROGRESS NOTES
Clinic Care Coordination Contact  Follow Up Progress Note      Assessment: RN CC called and spoke to patient's wife (consent to communicate on file). Patient is due for his monthly outreach. Patient is also due for an updated complex care plan. Patient's wife reports things have been going well. She said overall the patient has been feeling well. She said the patient has all of his needed follow up appointments scheduled. Rose said she can't think of anything they needs right now. She said they will reach out if something comes up.     Care Gaps:    Health Maintenance Due   Topic Date Due    SPIROMETRY  Never done    COPD ACTION PLAN  Never done    HEPATITIS B IMMUNIZATION (1 of 3 - 3-dose series) Never done    MEDICARE ANNUAL WELLNESS VISIT  06/21/2017    COVID-19 Vaccine (3 - Moderna series) 10/04/2021    COLORECTAL CANCER SCREENING  10/13/2022    INFLUENZA VACCINE (1) 09/01/2023    ZOSTER IMMUNIZATION (1 of 2) 11/06/2022    ADVANCE CARE PLANNING  10/11/2023       Postponed to next Primary Care Provider visit     Care Plans  Care Plan: Annual Wellness       Problem: Appointment       Goal: I will complete my annual wellness visit.       Start Date: 5/20/2022 Expected End Date: 10/2/2022    This Visit's Progress: 20% Recent Progress: 10%    Note:     Barriers: complex care, and high volume of appointments at baseline  Strengths: wife is pts main caregiver, and organizes his appointments.   Patient expressed understanding of goal: yes  Action steps to achieve this goal:  1. I will schedule my annual wellness visit; [phone] ([phone])  2. I will attend my annual wellness visit.  3. I will contact my Care Management or clinic team if I have barriers to attending my annual wellness visit.                                Intervention/Education provided during outreach: As above. CC role, goal(s), clinic after hours, appointments, discussed/reviewed. Support provided.     Outreach Frequency: monthly    Plan:    Patient will take all medications as prescribed.   Patient will call clinic with any signs of infection.   Patient will continue to work with Primary Care Provider on care gaps and health maintenance.   RN CC sent updated complex care plan to patient via [employer].   Patient/caregiver will call RN CC with questions, concerns, support needs. RN CC will be available as needed.     Care Coordinator will follow up in 1 month.     Kinsey Muhammad RN Care Coordination   [facility]  Email: [email or web address]  Phone: [phone]

## 2023-09-11 NOTE — TELEPHONE ENCOUNTER
Home Care is calling regarding an established patient with [facility].       Requesting orders from: Chuy Isaac  Provider is following patient: Yes  Is this a 60-day recertification request?  No    Orders Requested    Skilled Nursing  Request for continuation of care with no increase or decrease in frequency  Frequency:  1x/wk for 9 wks  2 PRNs          Verbal orders given.  Home Care will send orders for provider to sign.  Confirmed ok to leave a detailed message with call back.  Contact information confirmed and updated as needed.    Jyothi Robertson RN

## 2023-09-12 ENCOUNTER — MEDICAL CORRESPONDENCE (OUTPATIENT)
Dept: HEALTH INFORMATION MANAGEMENT | Facility: CLINIC | Age: 53
End: 2023-09-12
Payer: COMMERCIAL

## 2023-09-13 ENCOUNTER — MYC MEDICAL ADVICE (OUTPATIENT)
Dept: INTERNAL MEDICINE | Facility: CLINIC | Age: 53
End: 2023-09-13
Payer: COMMERCIAL

## 2023-09-13 ENCOUNTER — TELEPHONE (OUTPATIENT)
Dept: INTERVENTIONAL RADIOLOGY/VASCULAR | Facility: CLINIC | Age: 53
End: 2023-09-13

## 2023-09-13 ENCOUNTER — HOSPITAL ENCOUNTER (OUTPATIENT)
Dept: GENERAL RADIOLOGY | Facility: CLINIC | Age: 53
Discharge: HOME | End: 2023-09-13
Attending: PHYSICIAN ASSISTANT | Admitting: PHYSICIAN ASSISTANT
Payer: COMMERCIAL

## 2023-09-13 DIAGNOSIS — F41.9 CHRONIC ANXIETY: ICD-10-CM

## 2023-09-13 DIAGNOSIS — Z78.9 CENTRAL VENOUS CATHETER IN PLACE: Primary | ICD-10-CM

## 2023-09-13 DIAGNOSIS — Z78.9 ON TOTAL PARENTERAL NUTRITION: ICD-10-CM

## 2023-09-13 DIAGNOSIS — Z78.9 CENTRAL VENOUS CATHETER IN PLACE: ICD-10-CM

## 2023-09-13 PROCEDURE — 71046 X-RAY EXAM CHEST 2 VIEWS: CPT

## 2023-09-13 NOTE — TELEPHONE ENCOUNTER
Attempt to reach patient to discuss CVC concerns. Left VM for pt to return call.    Myesha GIMENEZ RN  Interventional Radiology  [phone]

## 2023-09-13 NOTE — TELEPHONE ENCOUNTER
ERROR.    Pending Prescriptions:                       Disp   Refills    LORazepam (ATIVAN) 1 MG tablet             30 tab*0        Sig: TAKE 1 TABLET (1MG) BY MOUTH AS NEEDED FOR ANXIETY           WITH TPN AND MEDICATIONS . JUST ONCE A DAY (30 TO           LAST 30 DAYS)    amphetamine-dextroamphetamine (ADDERALL) 2*30 tab*0        Sig: TAKE ONE TABLET BY MOUTH ONCE DAILY    Routing refill request to provider for review/approval because:  Drug not on the [facility] refill protocol

## 2023-09-13 NOTE — PROGRESS NOTES
Parker calling saying the purple lumen of his left IJ line will not aspirate or flush. The red lumen flushes but doesn't aspirate. Recommended repeat chest x-ray closer to home for IR to review. IR will likely need to speak with ID to collaborate on plan for new line with recent positive blood culture and treatment with daptomycin. Most likely will require revision with new venotomy/puncture. Left IJ 6 [place] PowerLine in place since 7/10/23.

## 2023-09-14 ENCOUNTER — HOSPITAL ENCOUNTER (OUTPATIENT)
Facility: CLINIC | Age: 53
Setting detail: NUCLEAR MEDICINE
End: 2023-09-14
Payer: COMMERCIAL

## 2023-09-14 DIAGNOSIS — Z78.9 ON TOTAL PARENTERAL NUTRITION: Primary | ICD-10-CM

## 2023-09-14 RX ORDER — ALPRAZOLAM 0.5 MG
0.5 TABLET ORAL 2 TIMES DAILY PRN
Qty: 60 TABLET | Refills: 0 | Status: SHIPPED | OUTPATIENT
Start: 2023-09-14 | End: 2023-10-12

## 2023-09-18 ENCOUNTER — OFFICE VISIT (OUTPATIENT)
Dept: CARDIOLOGY | Facility: CLINIC | Age: 53
End: 2023-09-18
Attending: INTERNAL MEDICINE
Payer: COMMERCIAL

## 2023-09-18 ENCOUNTER — MYC MEDICAL ADVICE (OUTPATIENT)
Dept: INTERVENTIONAL RADIOLOGY/VASCULAR | Facility: CLINIC | Age: 53
End: 2023-09-18

## 2023-09-18 VITALS
DIASTOLIC BLOOD PRESSURE: 98 MMHG | SYSTOLIC BLOOD PRESSURE: 126 MMHG | BODY MASS INDEX: 27.86 KG/M2 | HEART RATE: 72 BPM | HEIGHT: 71 IN | RESPIRATION RATE: 16 BRPM | OXYGEN SATURATION: 98 % | WEIGHT: 199 LBS

## 2023-09-18 DIAGNOSIS — I42.8 OTHER CARDIOMYOPATHY (H): Primary | ICD-10-CM

## 2023-09-18 DIAGNOSIS — R93.1 ABNORMAL ECHOCARDIOGRAM: ICD-10-CM

## 2023-09-18 PROCEDURE — 99214 OFFICE O/P EST MOD 30 MIN: CPT | Performed by: INTERNAL MEDICINE

## 2023-09-18 RX ORDER — SODIUM CHLORIDE 9 MG/ML
INJECTION, SOLUTION INTRAVENOUS CONTINUOUS
Status: CANCELLED | OUTPATIENT
Start: 2023-09-18

## 2023-09-18 RX ORDER — CEFAZOLIN SODIUM 2 G/50ML
2 SOLUTION INTRAVENOUS
Status: CANCELLED | OUTPATIENT
Start: 2023-09-18

## 2023-09-18 RX ORDER — LIDOCAINE 40 MG/G
CREAM TOPICAL
Status: CANCELLED | OUTPATIENT
Start: 2023-09-18

## 2023-09-18 ASSESSMENT — PAIN SCALES - GENERAL: PAINLEVEL: MILD PAIN (3)

## 2023-09-18 NOTE — LETTER
9/18/2023    Chuy Isaac MD  [address] [facility] [address]    RE: Parker Prettyvignesh       Dear Colleague,     I had the pleasure of seeing Parker Acevedo in the [facility].  HISTORY:    Parker Acevedo is a pleasant 52-year-old gentleman accompanied by his wife today.  He was previously morbidly obese weighing 500 pounds but underwent bariatric surgery and developed gastric ulcers.  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  In 2017 an echocardiogram was done suggesting a mildly reduced ejection fraction of 45 to 50%, judged to be nonischemic.  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  He has been hospitalized several times in the last year with bacteremia.  He had an echo done on July 5 showing normal ejection fraction and normal valves.  Previous echoes dating back to 2021 showed a normal ejection fraction.  He had a RONEL done in July showing all valves to be normal.  He has been seen at [facility] cardiology group who last saw him in 2021.  He is here today at his request to reestablish cardiology care.    Parker in general seems to be doing very well from a cardiovascular standpoint.  He reports that he has intermittent episodes of palpitations or fluttering in his chest, lasting a few seconds.  He has had these since he was a child but they seem to be a little more frequent.  There are no identified triggers.  He also notices some very brief episodes of dyspnea which seem to be random.  He sometimes gets some superficial chest pain mostly on the left-hand side but this is nonexertional and is clearly not of cardiac origin.  He sleeps in a chair because if he lays flat he aspirates.    Parker has not had exertional chest arm neck shoulder or jaw discomfort, significant peripheral edema or symptoms of claudication.  He has not had strokelike symptoms.  He does notice that  when he stands up he often briefly gets very dizzy and on rare occasions has fallen from this.    Parker checks his blood pressure at home but uses a wrist machine that he has never had checked.  His numbers tend to run high.  In the office his blood pressure values fluctuate quite widely.  Today his systolic pressure is normal but his diastolic pressure is elevated at 98.      ASSESSMENT/PLAN:    1.  Mild cardiomyopathy.  This was reported in the past by echo done elsewhere.  I am not sure of the circumstances of this.  This may have been viral, or could have been due to bacterial toxins or it may just be idiopathic.  In any case his ejection fraction has normalized using just carvedilol 12.5 mg twice daily.  I do not think we need to pursue guideline directed medical therapy further.  2.  Hypertension.  Blood pressure is a little bit on the high side today but he does complain of orthostatic dizziness and has fallen with this a couple of times over the years.  I am hesitant to be very aggressive blood pressure management.  We could always consider going up on his dose of carvedilol (since his heart rate would likely tolerate this), or adding amlodipine to his regimen if necessary.  I will leave this up to his primary caregiver.    Thank you for inviting me to participate in the care of your patient.  Please don't hesitate to call if I can be of further assistance.  45 minutes were spent today reviewing the chart and other records, interviewing and examining the patient, and documenting our visit.  I will plan on seeing him in 2 years with a repeat echo to reevaluate his ejection fraction.  If he has cardiac issues before then I would happy to see him sooner.    Chart documentation was completed, in part, with [employer] voice-recognition software. Even though reviewed, some grammatical, spelling, and word errors may remain.       Orders Placed This Encounter   Procedures    Follow-Up with Cardiologist     No orders of  "the defined types were placed in this encounter.    There are no discontinued medications.    10 year ASCVD risk: The ASCVD Risk score (Flavia LEIVA, et al., 2019) failed to calculate for the following reasons:    Cannot find a previous HDL lab    Cannot find a previous total cholesterol lab    Encounter Diagnoses   Name Primary?    Abnormal echocardiogram     Other cardiomyopathy (H) Yes       CURRENT MEDICATIONS:  Current Outpatient Medications   Medication Sig Dispense Refill    albuterol (VENTOLIN HFA) 108 (90 Base) MCG/ACT inhaler Inhale 2 puffs into the lungs every 6 hours as needed 18 g 1    ALPRAZolam (XANAX) 0.5 MG tablet TAKE 1 TABLET (0.5 MG) BY MOUTH 2 TIMES DAILY AS NEEDED FOR SLEEP OR ANXIETY 60 tablet 0    amphetamine-dextroamphetamine (ADDERALL) 20 MG tablet TAKE ONE TABLET BY MOUTH ONCE DAILY 30 tablet 0    bisacodyl (DULCOLAX) 5 MG EC tablet Two days prior to exam take two (2) tablets at 4pm. One day prior to exam take two (2) tablets at 4pm 4 tablet 0    carvedilol (COREG) 6.25 MG tablet TAKE 2 TABLETS (12.5 MG) BY MOUTH 2 TIMES DAILY WITH MEALS 60 tablet 3    cyanocobalamin (CYANOCOBALAMIN) 1000 MCG/ML injection INJECT 1 ML INTO THE MUSCLE EVERY 30 DAYS 1 mL 3    enoxaparin ANTICOAGULANT (LOVENOX) 80 MG/0.8ML syringe INJECT THE CONTENTS OF ONE SYRINGE (80MG) UNDER THE SKIN TWO TIMES A DAY 67.2 mL 0    [facility] MANAGED PATIENT Contact [facility] for patient specific medication information at [phone] on admission and discharge from the hospital.  Phones are answered 24 hours a day 7 days a week 365 days a year.    Providers - Choose \"CONTINUE HOME MED (no script)\" at discharge if patient treatment with home infusion will continue.      fentaNYL (DURAGESIC) 25 mcg/hr 72 hr patch Place 1 patch onto the skin every 48 hours Fill 08/29/23 and start 08/31/23. 30 day supply for chronic pain. 15 patch 0    naloxone (NARCAN) 4 MG/0.1ML nasal spray Spray 1 spray (4 mg) into one " nostril alternating nostrils once as needed for opioid reversal every 2-3 minutes until assistance arrives 0.2 mL 0    nystatin (MYCOSTATIN) 244236 UNIT/GM external cream APPLY TOPICALLY 2 TIMES DAILY 30 g 1    ondansetron (ZOFRAN ODT) 8 MG ODT tab DISSOLVE ONE TABLET ON TONGUE EVERY 8 HOURS AS NEEDED FOR NAUSEA 90 tablet 1    ondansetron (ZOFRAN) 4 MG tablet Take one tablet every six hours for nausea during colonoscopy bowel prepping 3 tablet 0    oxyCODONE (ROXICODONE) 5 MG/5ML solution Take 5-10 mLs (5-10 mg) by mouth every 4 hours as needed for moderate to severe pain Max of 40mg/day. Fill 08/29/23 and start 08/31/23. 30 day supply for chronic pain. 1200 mL 0    polyethylene glycol (GOLYTELY) 236 g suspension Take as directed. Two days before your exam fill the first container with water. Cover and shake until mixed well. At 5:00pm drink one 8oz glass every 10-15 minutes until half (1/2) of the first container is empty. Store the remainder in the refrigerator. One day before your exam at 5:00pm drink the second half of the first container until it is gone. Before you go to bed mix the second container with water and put in refrigerator. Six hours before your check in time drink one 8oz glass every 10-15 minutes until half of container is empty. Discard the remainder of solution. 8000 mL 0    polyethylene glycol (MIRALAX) 17 GM/Dose powder Take 17 g by mouth daily 1020 g 3    sodium chloride 4.64 mL with gentamicin 12.4 mg, heparin (porcine) 50 Units for Dialysis Catheter Care gentamicin (GARAMYCIN) 2.5 mg/mL, heparin (porcine) 10 Units/mL in sodium chloride 0.9 % 5 mL Antimicrobial Catheter Lock Therapy    Dwell in lumen #1 when TPN is not running. 1 each 0    sodium chloride 4.64 mL with gentamicin 12.4 mg, heparin (porcine) 50 Units for Dialysis Catheter Care gentamicin (GARAMYCIN) 2.5 mg/mL, heparin (porcine) 10 Units/mL in sodium chloride 0.9 % 5 mL Antimicrobial Catheter Lock Therapy    Dwell in lumen #2  "when TPN is not running. 1 each 0    sucralfate (CARAFATE) 1 GM/10ML suspension TAKE 10MLS  BY MOUTH FOUR TIMES A DAY AS NEEDED 420 mL 1    Syringe/Needle, Disp, (B-D ECLIPSE SYRINGE) 27G X 1/2\" 1 ML MISC 1 Device every 30 days 30 each 1    vitamin D2 (ERGOCALCIFEROL) 41167 units (1250 mcg) capsule Take 1 capsule (50,000 Units) by mouth once a week 12 capsule 3       ALLERGIES     Allergies   Allergen Reactions    Bactrim [Sulfamethoxazole-Trimethoprim] Rash    Penicillins Anaphylaxis     Please see Antimicrobial Management Team allergy assessment note 10/10/2018. Patient reported tolerating amoxicillin.  Tolerating cefepime and ceftriaxone without reaction 6/23    Ertapenem Nausea and Vomiting    Doxycycline Rash    Vancomycin Rash     Rash after receiving vancomycin 3/28/16 (infusion reaction?). Tolerated with slower infusion and diphenhydramine premed.  Tolerated 1250mg over 90minutes 7/2023.       PAST MEDICAL HISTORY:  Past Medical History:   Diagnosis Date    ADHD (attention deficit hyperactivity disorder)     Anxiety     Cardiomyopathy in nutritional diseases (H)     mild EF ~45% on rest 2/13/17, improves with stressing    Chronic abdominal pain     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    Complication of anesthesia     Difficulty swallowing     Gastric ulcer, unspecified as acute or chronic, without mention of hemorrhage, perforation, or obstruction     Gastro-oesophageal reflux disease     Head injury     Hiatal hernia     Other bladder disorder     Other chronic pain     PONV (postoperative nausea and vomiting)     Severe malnutrition (H)     TPN    Short gut syndrome     Tobacco abuse        PAST SURGICAL HISTORY:  Past Surgical History:   Procedure Laterality Date    AMPUTATION      APPENDECTOMY      BACK SURGERY  11/3/2014    curve in the spine    BIOPSY LYMPH NODE CERVICAL N/A 2/20/2015    Procedure: BIOPSY LYMPH NODE CERVICAL;  Surgeon: Baron Scanlon MD;  Location: " [facility]    CHOLECYSTECTOMY      COLONOSCOPY N/A 7/14/2021    Procedure: COLONOSCOPY;  Surgeon: Jimbo Estrada MD;  Location: [facility] OR    COLONOSCOPY N/A 4/13/2022    Procedure: COLONOSCOPY;  Surgeon: Jimbo Estrada MD;  Location: [facility] OR    DISCECTOMY, FUSION CERVICAL ANTERIOR ONE LEVEL, COMBINED N/A 2/15/2017    Procedure: COMBINED DISCECTOMY, FUSION CERVICAL ANTERIOR ONE LEVEL;  Surgeon: Darren Campos MD;  [facility]    ENDOSCOPIC INSERTION TUBE GASTROSTOMY  9/9/2013    Procedure: ENDOSCOPIC INSERTION TUBE GASTROSTOMY;;  Surgeon: Francis Vyas MD;  [facility]    ENDOSCOPIC ULTRASOUND UPPER GASTROINTESTINAL TRACT (GI)  4/29/2011    Procedure:ENDOSCOPIC ULTRASOUND UPPER GASTROINTESTINAL TRACT (GI); Both Procedures done Conjointly; Surgeon:NEREIDA HOUSER; [facility]    ENDOSCOPIC ULTRASOUND UPPER GASTROINTESTINAL TRACT (GI)  9/9/2013    Procedure: ENDOSCOPIC ULTRASOUND UPPER GASTROINTESTINAL TRACT (GI);  Endoscopic Ultrasound Guide Gastrostomy Tube Placement  C-arm;  Surgeon: Noe Lizarraga MD;  [facility]    ENDOSCOPIC ULTRASOUND UPPER GASTROINTESTINAL TRACT (GI) N/A 2/24/2021    Procedure: ENDOSCOPIC ULTRASOUND, ESOPHAGOSCOPY / UPPER GASTROINTESTINAL TRACT (GI), esophagastrogastroduodenoscopy;  Surgeon: Berny Bach MD;  [facility]    ENDOSCOPY  03/25/11    EGD, [facility]    ENDOSCOPY  08/04/09    Upper Endoscopy, [facility]    ENDOSCOPY  01/05/09    Upper Endoscopy, [facility]    ESOPHAGOSCOPY, GASTROSCOPY, DUODENOSCOPY (EGD), COMBINED  4/20/2011    Procedure:COMBINED ESOPHAGOSCOPY, GASTROSCOPY, DUODENOSCOPY (EGD); Surgeon:FRANCIS VYAS; [facility]    ESOPHAGOSCOPY, GASTROSCOPY, DUODENOSCOPY (EGD), COMBINED  6/15/2011    Procedure:COMBINED ESOPHAGOSCOPY, GASTROSCOPY, DUODENOSCOPY (EGD); Surgeon:FRANCIS VYAS; [facility]    ESOPHAGOSCOPY, GASTROSCOPY, DUODENOSCOPY (EGD), COMBINED  6/12/2013     Procedure: COMBINED ESOPHAGOSCOPY, GASTROSCOPY, DUODENOSCOPY (EGD);;  Surgeon: Francis Vyas MD;  [facility]    ESOPHAGOSCOPY, GASTROSCOPY, DUODENOSCOPY (EGD), COMBINED  11/22/2013    Procedure: COMBINED ESOPHAGOSCOPY, GASTROSCOPY, DUODENOSCOPY (EGD);;  Surgeon: Francis Vyas MD;  [facility]    ESOPHAGOSCOPY, GASTROSCOPY, DUODENOSCOPY (EGD), COMBINED  4/30/2014    Procedure: COMBINED ESOPHAGOSCOPY, GASTROSCOPY, DUODENOSCOPY (EGD);  Surgeon: Francis Vyas MD;  Location:  GI    ESOPHAGOSCOPY, GASTROSCOPY, DUODENOSCOPY (EGD), COMBINED N/A 2/20/2015    Procedure: COMBINED ESOPHAGOSCOPY, GASTROSCOPY, DUODENOSCOPY (EGD), BIOPSY SINGLE OR MULTIPLE;  Surgeon: Baron Scanlon MD;  Location:  OR    ESOPHAGOSCOPY, GASTROSCOPY, DUODENOSCOPY (EGD), COMBINED N/A 9/30/2015    Procedure: COMBINED ESOPHAGOSCOPY, GASTROSCOPY, DUODENOSCOPY (EGD);  Surgeon: Francis Vyas MD;  Location:  GI    ESOPHAGOSCOPY, GASTROSCOPY, DUODENOSCOPY (EGD), COMBINED N/A 10/3/2019    Procedure: Upper Endoscopy;  Surgeon: Clif Morrow MD;  [facility]    GASTRECTOMY  6/22/2012    Procedure: GASTRECTOMY;  Open Approach, Excise Ulcers,Partial Gastrectomy, Esophagojejunostomy, Hiatal Hernia Repair, Extensive Lysis of Adhesions and Esaphagogastrodudenoscopy.;  Surgeon: Francis Vyas MD;  [facility]    GASTROJEJUNOSTOMY  08/26/09    Extensice enterolysis, partial resect. jejunum, part. resect gastric pouch, gastrojejunostomy anastomosis    HC ESOPH/GAS REFLUX TEST W NASAL IMPED ELECTRODE  8/5/2013    Procedure: ESOPHAGEAL IMPEDENCE FUNCTION TEST 1 HOUR OR LESS;  Surgeon: Halie Lang MD;  Location:  GI    HEAD & NECK SURGERY  2/15/2017    C5-C6    HERNIA REPAIR  2006    Umbilical hernia    HERNIORRHAPHY HIATAL  6/22/2012    Procedure: HERNIORRHAPHY HIATAL;;  Surgeon: Francis Vyas MD;  [facility]    HERNIORRHAPHY INGUINAL  11/22/2013    Procedure: HERNIORRHAPHY  INGUINAL;;  Surgeon: Francis Vyas MD;  [facility]    INSERT PICC LINE Right 12/19/2019    Procedure: Picc Placement;  Surgeon: Per Dumont PA-C;  [facility]    INSERT PICC LINE Right 2/21/2020    Procedure: INSERTION, PICC;  Surgeon: Per Dumont PA-C;  [facility]    INSERT PORT VASCULAR ACCESS Right 12/19/2017    Procedure: INSERT PORT VASCULAR ACCESS;  Right Chest Port Placement ;  Surgeon: Lisandro Alejandro PA-C;  [facility]    INSERT PORT VASCULAR ACCESS Right 8/2/2018    Procedure: INSERT PORT VASCULAR ACCESS;  Place single lumen tunneled central venous access catheter;  Surgeon: Guy Jamil PA-C;  [facility]    IR CVC TUNNEL PLACEMENT > 5 YRS OF AGE  8/7/2019    IR CVC TUNNEL PLACEMENT > 5 YRS OF AGE  4/14/2020    IR CVC TUNNEL PLACEMENT > 5 YRS OF AGE  8/3/2020    IR CVC TUNNEL PLACEMENT > 5 YRS OF AGE  9/4/2020    IR CVC TUNNEL PLACEMENT > 5 YRS OF AGE  2/5/2021    IR CVC TUNNEL PLACEMENT > 5 YRS OF AGE  3/23/2021    IR CVC TUNNEL PLACEMENT > 5 YRS OF AGE  1/13/2022    IR CVC TUNNEL PLACEMENT > 5 YRS OF AGE  5/12/2022    IR CVC TUNNEL PLACEMENT > 5 YRS OF AGE  7/13/2022    IR CVC TUNNEL PLACEMENT > 5 YRS OF AGE  7/10/2023    IR CVC TUNNEL REMOVAL LEFT  6/7/2023    IR CVC TUNNEL REMOVAL RIGHT  10/1/2019    IR CVC TUNNEL REMOVAL RIGHT  7/30/2020    IR CVC TUNNEL REMOVAL RIGHT  9/2/2020    IR CVC TUNNEL REMOVAL RIGHT  2/3/2021    IR CVC TUNNEL REMOVAL RIGHT  3/19/2021    IR CVC TUNNEL REMOVAL RIGHT  1/10/2022    IR CVC TUNNEL REMOVAL RIGHT  5/9/2022    IR CVC TUNNEL REMOVAL RIGHT  7/8/2022    IR CVC TUNNEL REVISION LEFT  8/10/2022    IR CVC TUNNEL REVISION RIGHT  5/7/2021    IR FOLLOW UP VISIT OUTPATIENT  8/7/2019    IR PICC EXCHANGE LEFT  6/8/2023    IR PICC PLACEMENT > 5 YRS OF AGE  3/7/2019    IR PICC PLACEMENT > 5 YRS OF AGE  12/19/2019    IR PICC PLACEMENT > 5 YRS OF AGE  2/21/2020    IR PICC PLACEMENT > 5 YRS OF AGE  6/7/2023    LAPAROTOMY  EXPLORATORY  11/22/2013    Procedure: LAPAROTOMY EXPLORATORY;  Exploratory Laparotomy, Upper Endoscopy, Left Inguinal Hernia Repair;  Surgeon: Francis Vyas MD;  [facility]    ORTHOPEDIC SURGERY      PICC INSERTION Right 03/16/2017    5fr DL BioFlo PICC, 42cm (3cm external) in the R medial brachial vein w/ tip in the SVC RA junction.    PICC INSERTION Left 09/23/2017    5fr DL BioFlo PICC, 45cm (1cm external) in the L basilic vein w/ tip in the SVC RA junction.    PICC INSERTION Right 05/16/2019    5Fr - 43cm, Medial brachial vein, low SVC    PICC INSERTION Right 10/02/2019    5Fr - 43cm (2cm external), basilic vein, low SVC    SHAYLEE EN Y BOWEL  2003    SOFT TISSUE SURGERY      THORACIC SURGERY      TONSILLECTOMY      TRANSESOPHAGEAL ECHOCARDIOGRAM INTRAOPERATIVE N/A 1/8/2019    Procedure: TRANSESOPHAGEAL ECHOCARDIOGRAM INTRAOPERATIVE;  Surgeon: GENERIC ANESTHESIA PROVIDER;  [facility]    TRANSESOPHAGEAL ECHOCARDIOGRAM INTRAOPERATIVE N/A 7/7/2023    Procedure: Transesophageal echocardiogram in the OR;  Surgeon: GENERIC ANESTHESIA PROVIDER;  [facility]    ZZC GASTRIC BYPASS,OBESE<100CM SHAYLEE-EN-Y  2002    lost 300 pounds       FAMILY HISTORY:  Family History   Problem Relation Age of Onset    Gastrointestinal Disease Mother         Crohns disease    Anxiety Disorder Mother     Thyroid Disease Mother         Grave's disease    Cancer Father         ear cancer-skin cancer/melanoma    Breast Cancer Maternal Grandmother     Macular Degeneration Maternal Grandfather     Anxiety Disorder Sister     Diabetes Maternal Uncle     Breast Cancer Other     Hypertension No family hx of     Hyperlipidemia No family hx of     Cerebrovascular Disease No family hx of     Prostate Cancer No family hx of     Depression No family hx of     Anesthesia Reaction No family hx of     Asthma No family hx of     Osteoporosis No family hx of     Genetic Disorder No family hx of     Obesity No family hx of     Mental Illness  No family hx of     Substance Abuse No family hx of     Glaucoma No family hx of        SOCIAL HISTORY:  Social History     Socioeconomic History    Marital status:      Spouse name: Rose    Number of children: 1    Years of education: None    Highest education level: GED or equivalent   Tobacco Use    Smoking status: Light Smoker     Packs/day: 0.50     Years: 3.00     Pack years: 1.50     Types: Cigarettes    Smokeless tobacco: Never    Tobacco comments:     2/4/2021    smokes 3 cigarettes/day   Vaping Use    Vaping Use: Never used   Substance and Sexual Activity    Alcohol use: No     Comment: quit 2002    Drug use: No    Sexual activity: Yes     Partners: Female     Birth control/protection: None     Comment: no protection   Other Topics Concern    Parent/sibling w/ CABG, MI or angioplasty before 65F 55M? No     Social Determinants of Health     Financial Resource Strain: Medium Risk (5/20/2022)    Overall Financial Resource Strain (CARDIA)     Difficulty of Paying Living Expenses: Somewhat hard   Food Insecurity: No Food Insecurity (5/20/2022)    Hunger Vital Sign     Worried About Running Out of Food in the Last Year: Never true     Ran Out of Food in the Last Year: Never true   Transportation Needs: No Transportation Needs (5/20/2022)    PRAPARE - Transportation     Lack of Transportation (Medical): No     Lack of Transportation (Non-Medical): No   Physical Activity: Unknown (8/4/2020)    Exercise Vital Sign     Days of Exercise per Week: 2 days   Stress: No Stress Concern Present (8/4/2020)    [employer] [place] [employer] - Occupational Stress Questionnaire     Feeling of Stress : Only a little   Social Connections: Socially Isolated (8/4/2020)    Social Connection and Isolation Panel [NHANES]     Frequency of Communication with Friends and Family: Once a week     Frequency of Social Gatherings with Friends and Family: Never     Attends Adventism Services: Never     Active Member of  "Clubs or Organizations: No     Attends Club or Organization Meetings: Never     Marital Status:    Intimate Partner Violence: Not At Risk (8/4/2020)    Humiliation, Afraid, Rape, and Kick questionnaire     Fear of Current or Ex-Partner: No     Emotionally Abused: No     Physically Abused: No     Sexually Abused: No   Housing Stability: Low Risk  (8/4/2020)    Housing Stability Vital Sign     Unable to Pay for Housing in the Last Year: No     Number of Places Lived in the Last Year: 1     Unstable Housing in the Last Year: No       Review of Systems:  Skin:  Negative     Eyes:  Negative    ENT:  Negative    Respiratory:  Positive for shortness of breath  Cardiovascular:    palpitations;fatigue;lightheadedness;dizziness  Gastroenterology: Negative    Genitourinary:  Negative    Musculoskeletal:  Positive for back pain;neck pain  Neurologic:  Negative    Psychiatric:  Positive for anxiety  Heme/Lymph/Imm:  Positive for allergies  Endocrine:  Negative      Physical Exam:  Vitals: BP (!) 126/98   Pulse 72   Resp 16   Ht 1.803 m (5' 11\")   Wt 90.3 kg (199 lb)   SpO2 98%   BMI 27.75 kg/m      Constitutional:  cooperative, alert and oriented, well developed, well nourished, in no acute distress        Skin:  warm and dry to the touch, no apparent skin lesions or masses noted        Head:  normocephalic, no masses or lesions        Eyes:  pupils equal and round, conjunctivae and lids unremarkable, sclera white, no xanthalasma, EOMS intact, no nystagmus        ENT:  no pallor or cyanosis, dentition good        Neck:  carotid pulses are full and equal bilaterally, JVP normal, no carotid bruit        Chest:  normal breath sounds, clear to auscultation, normal A-P diameter, normal symmetry, normal respiratory excursion, no use of accessory muscles        Cardiac: regular rhythm, normal S1/S2, no S3 or S4, apical impulse not displaced, no murmurs, gallops or rubs                  Abdomen:  abdomen soft;BS normoactive "        Vascular: pulses full and equal, no bruits auscultated                                      Extremities and Muscular Skeletal:  no edema           Neurological:  no gross motor deficits        Psych:  affect appropriate, oriented to time, person and place     Recent Lab Results:  LIPID RESULTS:  Lab Results   Component Value Date    CHOL 109 08/20/2018    HDL 44 05/04/2016    LDL 68 05/04/2016    TRIG 86 08/15/2023    TRIG 71 04/13/2021    CHOLHDLRATIO 2.4 06/30/2015       LIVER ENZYME RESULTS:  Lab Results   Component Value Date    AST 20 08/15/2023    AST 17 06/29/2021    ALT 18 08/15/2023    ALT 24 06/29/2021       CBC RESULTS:  Lab Results   Component Value Date    WBC 6.9 08/22/2023    WBC 6.9 06/29/2021    RBC 4.01 (L) 08/22/2023    RBC 3.90 (L) 06/29/2021    HGB 11.6 (L) 08/22/2023    HGB 12.1 (L) 06/29/2021    HCT 36.5 (L) 08/22/2023    HCT 37.4 (L) 06/29/2021    MCV 91 08/22/2023    MCV 96 06/29/2021    MCH 28.9 08/22/2023    MCH 31.0 06/29/2021    MCHC 31.8 08/22/2023    MCHC 32.4 06/29/2021    RDW 15.9 (H) 08/22/2023    RDW 13.6 06/29/2021     08/22/2023     06/29/2021       BMP RESULTS:  Lab Results   Component Value Date     08/15/2023     06/29/2021    POTASSIUM 3.7 08/15/2023    POTASSIUM 3.8 12/13/2022    POTASSIUM 3.5 06/29/2021    CHLORIDE 107 08/15/2023    CHLORIDE 111 (H) 12/13/2022    CHLORIDE 111 (H) 06/29/2021    CO2 25 08/15/2023    CO2 29 12/13/2022    CO2 28 06/29/2021    ANIONGAP 11 08/15/2023    ANIONGAP 3 12/13/2022    ANIONGAP 4 06/29/2021    GLC 84 08/15/2023     (H) 08/07/2023     (H) 12/13/2022    GLC 96 06/29/2021    BUN 9.3 08/15/2023    BUN 17 12/13/2022    BUN 11 06/29/2021    CR 0.69 08/15/2023    CR 0.69 06/29/2021    GFRESTIMATED >90 08/15/2023    GFRESTIMATED >90 06/29/2021    GFRESTBLACK >90 06/29/2021    SILAS 8.8 08/15/2023    SILAS 8.3 (L) 06/29/2021        A1C RESULTS:  Lab Results   Component Value Date    A1C 4.9 07/23/2013        INR RESULTS:  Lab Results   Component Value Date    INR 1.09 08/07/2023    INR 1.06 07/10/2023    INR 1.07 05/07/2021    INR 1.10 03/22/2021       Eduardo Maynard MD, [facility]    CC  Chuy Isaac MD  [address] [place] [id number]    Thank you for allowing me to participate in the care of your patient.      Sincerely,   Eduardo Maynard MD     [facility]

## 2023-09-18 NOTE — TELEPHONE ENCOUNTER
Writer has spoken with Rose, Parker's wife, regarding planned procedure with IR via telephone.      Rose acknowledges understanding of pre-procedure instructions.         I have provided Rose with IR number ([phone]) for questions or concerns.    Rose states that patient will be coming directly from [facility] post coloscopy tomorrow.    Jessica FIGUEROA  Interventional Radiology RN   [phone]

## 2023-09-18 NOTE — PROGRESS NOTES
HISTORY:    Parker Acevedo is a pleasant 52-year-old gentleman accompanied by his wife today.  He was previously morbidly obese weighing 500 pounds but underwent bariatric surgery and developed gastric ulcers.  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  In 2017 an echocardiogram was done suggesting a mildly reduced ejection fraction of 45 to 50%, judged to be nonischemic.  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  He has been hospitalized several times in the last year with bacteremia.  He had an echo done on July 5 showing normal ejection fraction and normal valves.  Previous echoes dating back to 2021 showed a normal ejection fraction.  He had a RONEL done in July showing all valves to be normal.  He has been seen at [facility] cardiology group who last saw him in 2021.  He is here today at his request to reestablish cardiology care.    Parker in general seems to be doing very well from a cardiovascular standpoint.  He reports that he has intermittent episodes of palpitations or fluttering in his chest, lasting a few seconds.  He has had these since he was a child but they seem to be a little more frequent.  There are no identified triggers.  He also notices some very brief episodes of dyspnea which seem to be random.  He sometimes gets some superficial chest pain mostly on the left-hand side but this is nonexertional and is clearly not of cardiac origin.  He sleeps in a chair because if he lays flat he aspirates.    Parker has not had exertional chest arm neck shoulder or jaw discomfort, significant peripheral edema or symptoms of claudication.  He has not had strokelike symptoms.  He does notice that when he stands up he often briefly gets very dizzy and on rare occasions has fallen from this.    Parker checks his blood pressure at home but uses a wrist machine that he has never had checked.  His numbers tend  to run high.  In the office his blood pressure values fluctuate quite widely.  Today his systolic pressure is normal but his diastolic pressure is elevated at 98.      ASSESSMENT/PLAN:    1.  Mild cardiomyopathy.  This was reported in the past by echo done elsewhere.  I am not sure of the circumstances of this.  This may have been viral, or could have been due to bacterial toxins or it may just be idiopathic.  In any case his ejection fraction has normalized using just carvedilol 12.5 mg twice daily.  I do not think we need to pursue guideline directed medical therapy further.  2.  Hypertension.  Blood pressure is a little bit on the high side today but he does complain of orthostatic dizziness and has fallen with this a couple of times over the years.  I am hesitant to be very aggressive blood pressure management.  We could always consider going up on his dose of carvedilol (since his heart rate would likely tolerate this), or adding amlodipine to his regimen if necessary.  I will leave this up to his primary caregiver.    Thank you for inviting me to participate in the care of your patient.  Please don't hesitate to call if I can be of further assistance.  45 minutes were spent today reviewing the chart and other records, interviewing and examining the patient, and documenting our visit.  I will plan on seeing him in 2 years with a repeat echo to reevaluate his ejection fraction.  If he has cardiac issues before then I would happy to see him sooner.    Chart documentation was completed, in part, with [employer] voice-recognition software. Even though reviewed, some grammatical, spelling, and word errors may remain.       Orders Placed This Encounter   Procedures    Follow-Up with Cardiologist     No orders of the defined types were placed in this encounter.    There are no discontinued medications.    10 year ASCVD risk: The ASCVD Risk score (Flavia DK, et al., 2019) failed to calculate for the following reasons:     "Cannot find a previous HDL lab    Cannot find a previous total cholesterol lab    Encounter Diagnoses   Name Primary?    Abnormal echocardiogram     Other cardiomyopathy (H) Yes       CURRENT MEDICATIONS:  Current Outpatient Medications   Medication Sig Dispense Refill    albuterol (VENTOLIN HFA) 108 (90 Base) MCG/ACT inhaler Inhale 2 puffs into the lungs every 6 hours as needed 18 g 1    ALPRAZolam (XANAX) 0.5 MG tablet TAKE 1 TABLET (0.5 MG) BY MOUTH 2 TIMES DAILY AS NEEDED FOR SLEEP OR ANXIETY 60 tablet 0    amphetamine-dextroamphetamine (ADDERALL) 20 MG tablet TAKE ONE TABLET BY MOUTH ONCE DAILY 30 tablet 0    bisacodyl (DULCOLAX) 5 MG EC tablet Two days prior to exam take two (2) tablets at 4pm. One day prior to exam take two (2) tablets at 4pm 4 tablet 0    carvedilol (COREG) 6.25 MG tablet TAKE 2 TABLETS (12.5 MG) BY MOUTH 2 TIMES DAILY WITH MEALS 60 tablet 3    cyanocobalamin (CYANOCOBALAMIN) 1000 MCG/ML injection INJECT 1 ML INTO THE MUSCLE EVERY 30 DAYS 1 mL 3    enoxaparin ANTICOAGULANT (LOVENOX) 80 MG/0.8ML syringe INJECT THE CONTENTS OF ONE SYRINGE (80MG) UNDER THE SKIN TWO TIMES A DAY 67.2 mL 0    [facility] MANAGED PATIENT Contact [facility] for patient specific medication information at [phone] on admission and discharge from the hospital.  Phones are answered 24 hours a day 7 days a week 365 days a year.    Providers - Choose \"CONTINUE HOME MED (no script)\" at discharge if patient treatment with home infusion will continue.      fentaNYL (DURAGESIC) 25 mcg/hr 72 hr patch Place 1 patch onto the skin every 48 hours Fill 08/29/23 and start 08/31/23. 30 day supply for chronic pain. 15 patch 0    naloxone (NARCAN) 4 MG/0.1ML nasal spray Spray 1 spray (4 mg) into one nostril alternating nostrils once as needed for opioid reversal every 2-3 minutes until assistance arrives 0.2 mL 0    nystatin (MYCOSTATIN) 773450 UNIT/GM external cream APPLY TOPICALLY 2 TIMES DAILY 30 g 1    " "ondansetron (ZOFRAN ODT) 8 MG ODT tab DISSOLVE ONE TABLET ON TONGUE EVERY 8 HOURS AS NEEDED FOR NAUSEA 90 tablet 1    ondansetron (ZOFRAN) 4 MG tablet Take one tablet every six hours for nausea during colonoscopy bowel prepping 3 tablet 0    oxyCODONE (ROXICODONE) 5 MG/5ML solution Take 5-10 mLs (5-10 mg) by mouth every 4 hours as needed for moderate to severe pain Max of 40mg/day. Fill 08/29/23 and start 08/31/23. 30 day supply for chronic pain. 1200 mL 0    polyethylene glycol (GOLYTELY) 236 g suspension Take as directed. Two days before your exam fill the first container with water. Cover and shake until mixed well. At 5:00pm drink one 8oz glass every 10-15 minutes until half (1/2) of the first container is empty. Store the remainder in the refrigerator. One day before your exam at 5:00pm drink the second half of the first container until it is gone. Before you go to bed mix the second container with water and put in refrigerator. Six hours before your check in time drink one 8oz glass every 10-15 minutes until half of container is empty. Discard the remainder of solution. 8000 mL 0    polyethylene glycol (MIRALAX) 17 GM/Dose powder Take 17 g by mouth daily 1020 g 3    sodium chloride 4.64 mL with gentamicin 12.4 mg, heparin (porcine) 50 Units for Dialysis Catheter Care gentamicin (GARAMYCIN) 2.5 mg/mL, heparin (porcine) 10 Units/mL in sodium chloride 0.9 % 5 mL Antimicrobial Catheter Lock Therapy    Dwell in lumen #1 when TPN is not running. 1 each 0    sodium chloride 4.64 mL with gentamicin 12.4 mg, heparin (porcine) 50 Units for Dialysis Catheter Care gentamicin (GARAMYCIN) 2.5 mg/mL, heparin (porcine) 10 Units/mL in sodium chloride 0.9 % 5 mL Antimicrobial Catheter Lock Therapy    Dwell in lumen #2 when TPN is not running. 1 each 0    sucralfate (CARAFATE) 1 GM/10ML suspension TAKE 10MLS  BY MOUTH FOUR TIMES A DAY AS NEEDED 420 mL 1    Syringe/Needle, Disp, (B-D ECLIPSE SYRINGE) 27G X 1/2\" 1 ML MISC 1 Device " every 30 days 30 each 1    vitamin D2 (ERGOCALCIFEROL) 84924 units (1250 mcg) capsule Take 1 capsule (50,000 Units) by mouth once a week 12 capsule 3       ALLERGIES     Allergies   Allergen Reactions    Bactrim [Sulfamethoxazole-Trimethoprim] Rash    Penicillins Anaphylaxis     Please see Antimicrobial Management Team allergy assessment note 10/10/2018. Patient reported tolerating amoxicillin.  Tolerating cefepime and ceftriaxone without reaction 6/23    Ertapenem Nausea and Vomiting    Doxycycline Rash    Vancomycin Rash     Rash after receiving vancomycin 3/28/16 (infusion reaction?). Tolerated with slower infusion and diphenhydramine premed.  Tolerated 1250mg over 90minutes 7/2023.       PAST MEDICAL HISTORY:  Past Medical History:   Diagnosis Date    ADHD (attention deficit hyperactivity disorder)     Anxiety     Cardiomyopathy in nutritional diseases (H)     mild EF ~45% on rest 2/13/17, improves with stressing    Chronic abdominal pain     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    Complication of anesthesia     Difficulty swallowing     Gastric ulcer, unspecified as acute or chronic, without mention of hemorrhage, perforation, or obstruction     Gastro-oesophageal reflux disease     Head injury     Hiatal hernia     Other bladder disorder     Other chronic pain     PONV (postoperative nausea and vomiting)     Severe malnutrition (H)     TPN    Short gut syndrome     Tobacco abuse        PAST SURGICAL HISTORY:  Past Surgical History:   Procedure Laterality Date    AMPUTATION      APPENDECTOMY      BACK SURGERY  11/3/2014    curve in the spine    BIOPSY LYMPH NODE CERVICAL N/A 2/20/2015    Procedure: BIOPSY LYMPH NODE CERVICAL;  Surgeon: Baron Scanlon MD;  [facility]    CHOLECYSTECTOMY      COLONOSCOPY N/A 7/14/2021    Procedure: COLONOSCOPY;  Surgeon: Jimbo Estrada MD;  Location: [facility] OR    COLONOSCOPY N/A 4/13/2022    Procedure: COLONOSCOPY;  Surgeon:  Jimbo Estrada MD;  Location: [facility] OR    DISCECTOMY, FUSION CERVICAL ANTERIOR ONE LEVEL, COMBINED N/A 2/15/2017    Procedure: COMBINED DISCECTOMY, FUSION CERVICAL ANTERIOR ONE LEVEL;  Surgeon: Darren Campos MD;  [facility]    ENDOSCOPIC INSERTION TUBE GASTROSTOMY  9/9/2013    Procedure: ENDOSCOPIC INSERTION TUBE GASTROSTOMY;;  Surgeon: Francis Vyas MD;  [facility]    ENDOSCOPIC ULTRASOUND UPPER GASTROINTESTINAL TRACT (GI)  4/29/2011    Procedure:ENDOSCOPIC ULTRASOUND UPPER GASTROINTESTINAL TRACT (GI); Both Procedures done Conjointly; Surgeon:NEREIDA HOUSER; [facility]    ENDOSCOPIC ULTRASOUND UPPER GASTROINTESTINAL TRACT (GI)  9/9/2013    Procedure: ENDOSCOPIC ULTRASOUND UPPER GASTROINTESTINAL TRACT (GI);  Endoscopic Ultrasound Guide Gastrostomy Tube Placement  C-arm;  Surgeon: Noe Lizarraga MD;  [facility]    ENDOSCOPIC ULTRASOUND UPPER GASTROINTESTINAL TRACT (GI) N/A 2/24/2021    Procedure: ENDOSCOPIC ULTRASOUND, ESOPHAGOSCOPY / UPPER GASTROINTESTINAL TRACT (GI), esophagastrogastroduodenoscopy;  Surgeon: Berny Bach MD;  [facility]    ENDOSCOPY  03/25/11    EGD, [facility]    ENDOSCOPY  08/04/09    Upper Endoscopy, [facility]    ENDOSCOPY  01/05/09    Upper Endoscopy, [facility]    ESOPHAGOSCOPY, GASTROSCOPY, DUODENOSCOPY (EGD), COMBINED  4/20/2011    Procedure:COMBINED ESOPHAGOSCOPY, GASTROSCOPY, DUODENOSCOPY (EGD); Surgeon:FRANCIS VYAS; [facility]    ESOPHAGOSCOPY, GASTROSCOPY, DUODENOSCOPY (EGD), COMBINED  6/15/2011    Procedure:COMBINED ESOPHAGOSCOPY, GASTROSCOPY, DUODENOSCOPY (EGD); Surgeon:FRANCIS VYAS; [facility]    ESOPHAGOSCOPY, GASTROSCOPY, DUODENOSCOPY (EGD), COMBINED  6/12/2013    Procedure: COMBINED ESOPHAGOSCOPY, GASTROSCOPY, DUODENOSCOPY (EGD);;  Surgeon: Francis Vyas MD;  [facility]    ESOPHAGOSCOPY, GASTROSCOPY, DUODENOSCOPY (EGD), COMBINED  11/22/2013    Procedure:  COMBINED ESOPHAGOSCOPY, GASTROSCOPY, DUODENOSCOPY (EGD);;  Surgeon: Francis Vyas MD;  [facility]    ESOPHAGOSCOPY, GASTROSCOPY, DUODENOSCOPY (EGD), COMBINED  4/30/2014    Procedure: COMBINED ESOPHAGOSCOPY, GASTROSCOPY, DUODENOSCOPY (EGD);  Surgeon: Francis Vyas MD;  Location:  GI    ESOPHAGOSCOPY, GASTROSCOPY, DUODENOSCOPY (EGD), COMBINED N/A 2/20/2015    Procedure: COMBINED ESOPHAGOSCOPY, GASTROSCOPY, DUODENOSCOPY (EGD), BIOPSY SINGLE OR MULTIPLE;  Surgeon: Baron Scanlon MD;  Location:  OR    ESOPHAGOSCOPY, GASTROSCOPY, DUODENOSCOPY (EGD), COMBINED N/A 9/30/2015    Procedure: COMBINED ESOPHAGOSCOPY, GASTROSCOPY, DUODENOSCOPY (EGD);  Surgeon: Francis Vyas MD;  Location:  GI    ESOPHAGOSCOPY, GASTROSCOPY, DUODENOSCOPY (EGD), COMBINED N/A 10/3/2019    Procedure: Upper Endoscopy;  Surgeon: Clif Morrow MD;  [facility]    GASTRECTOMY  6/22/2012    Procedure: GASTRECTOMY;  Open Approach, Excise Ulcers,Partial Gastrectomy, Esophagojejunostomy, Hiatal Hernia Repair, Extensive Lysis of Adhesions and Esaphagogastrodudenoscopy.;  Surgeon: Francis Vyas MD;  [facility]    GASTROJEJUNOSTOMY  08/26/09    Extensice enterolysis, partial resect. jejunum, part. resect gastric pouch, gastrojejunostomy anastomosis    HC ESOPH/GAS REFLUX TEST W NASAL IMPED ELECTRODE  8/5/2013    Procedure: ESOPHAGEAL IMPEDENCE FUNCTION TEST 1 HOUR OR LESS;  Surgeon: Halie Lang MD;  Location:  GI    HEAD & NECK SURGERY  2/15/2017    C5-C6    HERNIA REPAIR  2006    Umbilical hernia    HERNIORRHAPHY HIATAL  6/22/2012    Procedure: HERNIORRHAPHY HIATAL;;  Surgeon: Francis Vyas MD;  [facility]    HERNIORRHAPHY INGUINAL  11/22/2013    Procedure: HERNIORRHAPHY INGUINAL;;  Surgeon: Francis Vyas MD;  [facility]    INSERT PICC LINE Right 12/19/2019    Procedure: Picc Placement;  Surgeon: Per Dumont PA-C;  [facility]    INSERT PICC LINE  Right 2/21/2020    Procedure: INSERTION, PICC;  Surgeon: Per Dumont PA-C;  [facility]    INSERT PORT VASCULAR ACCESS Right 12/19/2017    Procedure: INSERT PORT VASCULAR ACCESS;  Right Chest Port Placement ;  Surgeon: Lisandro Alejandro PA-C;  [facility]    INSERT PORT VASCULAR ACCESS Right 8/2/2018    Procedure: INSERT PORT VASCULAR ACCESS;  Place single lumen tunneled central venous access catheter;  Surgeon: Guy Jamil PA-C;  [facility]    IR CVC TUNNEL PLACEMENT > 5 YRS OF AGE  8/7/2019    IR CVC TUNNEL PLACEMENT > 5 YRS OF AGE  4/14/2020    IR CVC TUNNEL PLACEMENT > 5 YRS OF AGE  8/3/2020    IR CVC TUNNEL PLACEMENT > 5 YRS OF AGE  9/4/2020    IR CVC TUNNEL PLACEMENT > 5 YRS OF AGE  2/5/2021    IR CVC TUNNEL PLACEMENT > 5 YRS OF AGE  3/23/2021    IR CVC TUNNEL PLACEMENT > 5 YRS OF AGE  1/13/2022    IR CVC TUNNEL PLACEMENT > 5 YRS OF AGE  5/12/2022    IR CVC TUNNEL PLACEMENT > 5 YRS OF AGE  7/13/2022    IR CVC TUNNEL PLACEMENT > 5 YRS OF AGE  7/10/2023    IR CVC TUNNEL REMOVAL LEFT  6/7/2023    IR CVC TUNNEL REMOVAL RIGHT  10/1/2019    IR CVC TUNNEL REMOVAL RIGHT  7/30/2020    IR CVC TUNNEL REMOVAL RIGHT  9/2/2020    IR CVC TUNNEL REMOVAL RIGHT  2/3/2021    IR CVC TUNNEL REMOVAL RIGHT  3/19/2021    IR CVC TUNNEL REMOVAL RIGHT  1/10/2022    IR CVC TUNNEL REMOVAL RIGHT  5/9/2022    IR CVC TUNNEL REMOVAL RIGHT  7/8/2022    IR CVC TUNNEL REVISION LEFT  8/10/2022    IR CVC TUNNEL REVISION RIGHT  5/7/2021    IR FOLLOW UP VISIT OUTPATIENT  8/7/2019    IR PICC EXCHANGE LEFT  6/8/2023    IR PICC PLACEMENT > 5 YRS OF AGE  3/7/2019    IR PICC PLACEMENT > 5 YRS OF AGE  12/19/2019    IR PICC PLACEMENT > 5 YRS OF AGE  2/21/2020    IR PICC PLACEMENT > 5 YRS OF AGE  6/7/2023    LAPAROTOMY EXPLORATORY  11/22/2013    Procedure: LAPAROTOMY EXPLORATORY;  Exploratory Laparotomy, Upper Endoscopy, Left Inguinal Hernia Repair;  Surgeon: Francis Vyas MD;  [facility]    ORTHOPEDIC  SURGERY      PICC INSERTION Right 03/16/2017    5fr DL BioFlo PICC, 42cm (3cm external) in the R medial brachial vein w/ tip in the SVC RA junction.    PICC INSERTION Left 09/23/2017    5fr DL BioFlo PICC, 45cm (1cm external) in the L basilic vein w/ tip in the SVC RA junction.    PICC INSERTION Right 05/16/2019    5Fr - 43cm, Medial brachial vein, low SVC    PICC INSERTION Right 10/02/2019    5Fr - 43cm (2cm external), basilic vein, low SVC    SHAYLEE EN Y BOWEL  2003    SOFT TISSUE SURGERY      THORACIC SURGERY      TONSILLECTOMY      TRANSESOPHAGEAL ECHOCARDIOGRAM INTRAOPERATIVE N/A 1/8/2019    Procedure: TRANSESOPHAGEAL ECHOCARDIOGRAM INTRAOPERATIVE;  Surgeon: GENERIC ANESTHESIA PROVIDER;  [facility]    TRANSESOPHAGEAL ECHOCARDIOGRAM INTRAOPERATIVE N/A 7/7/2023    Procedure: Transesophageal echocardiogram in the OR;  Surgeon: GENERIC ANESTHESIA PROVIDER;  [facility]    ZZC GASTRIC BYPASS,OBESE<100CM SHAYLEE-EN-Y  2002    lost 300 pounds       FAMILY HISTORY:  Family History   Problem Relation Age of Onset    Gastrointestinal Disease Mother         Crohns disease    Anxiety Disorder Mother     Thyroid Disease Mother         Grave's disease    Cancer Father         ear cancer-skin cancer/melanoma    Breast Cancer Maternal Grandmother     Macular Degeneration Maternal Grandfather     Anxiety Disorder Sister     Diabetes Maternal Uncle     Breast Cancer Other     Hypertension No family hx of     Hyperlipidemia No family hx of     Cerebrovascular Disease No family hx of     Prostate Cancer No family hx of     Depression No family hx of     Anesthesia Reaction No family hx of     Asthma No family hx of     Osteoporosis No family hx of     Genetic Disorder No family hx of     Obesity No family hx of     Mental Illness No family hx of     Substance Abuse No family hx of     Glaucoma No family hx of        SOCIAL HISTORY:  Social History     Socioeconomic History    Marital status:      Spouse name: Rose     Number of children: 1    Years of education: None    Highest education level: GED or equivalent   Tobacco Use    Smoking status: Light Smoker     Packs/day: 0.50     Years: 3.00     Pack years: 1.50     Types: Cigarettes    Smokeless tobacco: Never    Tobacco comments:     2/4/2021    smokes 3 cigarettes/day   Vaping Use    Vaping Use: Never used   Substance and Sexual Activity    Alcohol use: No     Comment: quit 2002    Drug use: No    Sexual activity: Yes     Partners: Female     Birth control/protection: None     Comment: no protection   Other Topics Concern    Parent/sibling w/ CABG, MI or angioplasty before 65F 55M? No     Social Determinants of Health     Financial Resource Strain: Medium Risk (5/20/2022)    Overall Financial Resource Strain (CARDIA)     Difficulty of Paying Living Expenses: Somewhat hard   Food Insecurity: No Food Insecurity (5/20/2022)    Hunger Vital Sign     Worried About Running Out of Food in the Last Year: Never true     Ran Out of Food in the Last Year: Never true   Transportation Needs: No Transportation Needs (5/20/2022)    PRAPARE - Transportation     Lack of Transportation (Medical): No     Lack of Transportation (Non-Medical): No   Physical Activity: Unknown (8/4/2020)    Exercise Vital Sign     Days of Exercise per Week: 2 days   Stress: No Stress Concern Present (8/4/2020)    [employer] [place] [employer] - Occupational Stress Questionnaire     Feeling of Stress : Only a little   Social Connections: Socially Isolated (8/4/2020)    Social Connection and Isolation Panel [NHANES]     Frequency of Communication with Friends and Family: Once a week     Frequency of Social Gatherings with Friends and Family: Never     Attends Christianity Services: Never     Active Member of Clubs or Organizations: No     Attends Club or Organization Meetings: Never     Marital Status:    Intimate Partner Violence: Not At Risk (8/4/2020)    Humiliation, Afraid, Rape, and Kick  "questionnaire     Fear of Current or Ex-Partner: No     Emotionally Abused: No     Physically Abused: No     Sexually Abused: No   Housing Stability: Low Risk  (8/4/2020)    Housing Stability Vital Sign     Unable to Pay for Housing in the Last Year: No     Number of Places Lived in the Last Year: 1     Unstable Housing in the Last Year: No       Review of Systems:  Skin:  Negative     Eyes:  Negative    ENT:  Negative    Respiratory:  Positive for shortness of breath  Cardiovascular:    palpitations;fatigue;lightheadedness;dizziness  Gastroenterology: Negative    Genitourinary:  Negative    Musculoskeletal:  Positive for back pain;neck pain  Neurologic:  Negative    Psychiatric:  Positive for anxiety  Heme/Lymph/Imm:  Positive for allergies  Endocrine:  Negative      Physical Exam:  Vitals: BP (!) 126/98   Pulse 72   Resp 16   Ht 1.803 m (5' 11\")   Wt 90.3 kg (199 lb)   SpO2 98%   BMI 27.75 kg/m      Constitutional:  cooperative, alert and oriented, well developed, well nourished, in no acute distress        Skin:  warm and dry to the touch, no apparent skin lesions or masses noted        Head:  normocephalic, no masses or lesions        Eyes:  pupils equal and round, conjunctivae and lids unremarkable, sclera white, no xanthalasma, EOMS intact, no nystagmus        ENT:  no pallor or cyanosis, dentition good        Neck:  carotid pulses are full and equal bilaterally, JVP normal, no carotid bruit        Chest:  normal breath sounds, clear to auscultation, normal A-P diameter, normal symmetry, normal respiratory excursion, no use of accessory muscles        Cardiac: regular rhythm, normal S1/S2, no S3 or S4, apical impulse not displaced, no murmurs, gallops or rubs                  Abdomen:  abdomen soft;BS normoactive        Vascular: pulses full and equal, no bruits auscultated                                      Extremities and Muscular Skeletal:  no edema           Neurological:  no gross motor deficits   "      Psych:  affect appropriate, oriented to time, person and place     Recent Lab Results:  LIPID RESULTS:  Lab Results   Component Value Date    CHOL 109 08/20/2018    HDL 44 05/04/2016    LDL 68 05/04/2016    TRIG 86 08/15/2023    TRIG 71 04/13/2021    CHOLHDLRATIO 2.4 06/30/2015       LIVER ENZYME RESULTS:  Lab Results   Component Value Date    AST 20 08/15/2023    AST 17 06/29/2021    ALT 18 08/15/2023    ALT 24 06/29/2021       CBC RESULTS:  Lab Results   Component Value Date    WBC 6.9 08/22/2023    WBC 6.9 06/29/2021    RBC 4.01 (L) 08/22/2023    RBC 3.90 (L) 06/29/2021    HGB 11.6 (L) 08/22/2023    HGB 12.1 (L) 06/29/2021    HCT 36.5 (L) 08/22/2023    HCT 37.4 (L) 06/29/2021    MCV 91 08/22/2023    MCV 96 06/29/2021    MCH 28.9 08/22/2023    MCH 31.0 06/29/2021    MCHC 31.8 08/22/2023    MCHC 32.4 06/29/2021    RDW 15.9 (H) 08/22/2023    RDW 13.6 06/29/2021     08/22/2023     06/29/2021       BMP RESULTS:  Lab Results   Component Value Date     08/15/2023     06/29/2021    POTASSIUM 3.7 08/15/2023    POTASSIUM 3.8 12/13/2022    POTASSIUM 3.5 06/29/2021    CHLORIDE 107 08/15/2023    CHLORIDE 111 (H) 12/13/2022    CHLORIDE 111 (H) 06/29/2021    CO2 25 08/15/2023    CO2 29 12/13/2022    CO2 28 06/29/2021    ANIONGAP 11 08/15/2023    ANIONGAP 3 12/13/2022    ANIONGAP 4 06/29/2021    GLC 84 08/15/2023     (H) 08/07/2023     (H) 12/13/2022    GLC 96 06/29/2021    BUN 9.3 08/15/2023    BUN 17 12/13/2022    BUN 11 06/29/2021    CR 0.69 08/15/2023    CR 0.69 06/29/2021    GFRESTIMATED >90 08/15/2023    GFRESTIMATED >90 06/29/2021    GFRESTBLACK >90 06/29/2021    SILAS 8.8 08/15/2023    SILAS 8.3 (L) 06/29/2021        A1C RESULTS:  Lab Results   Component Value Date    A1C 4.9 07/23/2013       INR RESULTS:  Lab Results   Component Value Date    INR 1.09 08/07/2023    INR 1.06 07/10/2023    INR 1.07 05/07/2021    INR 1.10 03/22/2021         Eduardo Maynard MD, [facility]    CLARITZA MAZARIEGOS  MD [place]  [address] [place] [id number]

## 2023-09-19 ENCOUNTER — APPOINTMENT (OUTPATIENT)
Dept: MEDSURG UNIT | Facility: CLINIC | Age: 53
End: 2023-09-19
Attending: INTERNAL MEDICINE
Payer: COMMERCIAL

## 2023-09-19 ENCOUNTER — APPOINTMENT (OUTPATIENT)
Dept: INTERVENTIONAL RADIOLOGY/VASCULAR | Facility: CLINIC | Age: 53
End: 2023-09-19
Attending: INTERNAL MEDICINE
Payer: COMMERCIAL

## 2023-09-19 ENCOUNTER — HOSPITAL ENCOUNTER (OUTPATIENT)
Facility: AMBULATORY SURGERY CENTER | Age: 53
Discharge: HOME | End: 2023-09-19
Attending: INTERNAL MEDICINE | Admitting: INTERNAL MEDICINE
Payer: COMMERCIAL

## 2023-09-19 ENCOUNTER — ANESTHESIA EVENT (OUTPATIENT)
Dept: SURGERY | Facility: AMBULATORY SURGERY CENTER | Age: 53
End: 2023-09-19
Payer: COMMERCIAL

## 2023-09-19 ENCOUNTER — ANESTHESIA (OUTPATIENT)
Dept: SURGERY | Facility: AMBULATORY SURGERY CENTER | Age: 53
End: 2023-09-19
Payer: COMMERCIAL

## 2023-09-19 ENCOUNTER — HOSPITAL ENCOUNTER (OUTPATIENT)
Facility: CLINIC | Age: 53
Discharge: HOME | End: 2023-09-19
Attending: INTERNAL MEDICINE | Admitting: RADIOLOGY
Payer: COMMERCIAL

## 2023-09-19 VITALS — HEART RATE: 76 BPM

## 2023-09-19 VITALS
OXYGEN SATURATION: 100 % | RESPIRATION RATE: 18 BRPM | HEART RATE: 84 BPM | HEIGHT: 71 IN | WEIGHT: 198 LBS | DIASTOLIC BLOOD PRESSURE: 84 MMHG | TEMPERATURE: 98.2 F | BODY MASS INDEX: 27.72 KG/M2 | SYSTOLIC BLOOD PRESSURE: 117 MMHG

## 2023-09-19 VITALS
SYSTOLIC BLOOD PRESSURE: 137 MMHG | DIASTOLIC BLOOD PRESSURE: 97 MMHG | OXYGEN SATURATION: 98 % | HEART RATE: 78 BPM | TEMPERATURE: 96.3 F | RESPIRATION RATE: 15 BRPM

## 2023-09-19 DIAGNOSIS — E44.0 MODERATE PROTEIN-CALORIE MALNUTRITION (H): ICD-10-CM

## 2023-09-19 DIAGNOSIS — E44.0 MODERATE PROTEIN-CALORIE MALNUTRITION (H): Primary | ICD-10-CM

## 2023-09-19 DIAGNOSIS — R78.81 GRAM-POSITIVE BACTEREMIA: ICD-10-CM

## 2023-09-19 DIAGNOSIS — R78.81 GRAM-NEGATIVE BACTEREMIA: Primary | ICD-10-CM

## 2023-09-19 DIAGNOSIS — Z78.9 ON TOTAL PARENTERAL NUTRITION: ICD-10-CM

## 2023-09-19 LAB
ALBUMIN SERPL BCG-MCNC: 3.6 G/DL (ref 3.5–5.2)
ALP SERPL-CCNC: 89 U/L (ref 40–129)
ALT SERPL W P-5'-P-CCNC: 15 U/L (ref 0–70)
ANION GAP SERPL CALCULATED.3IONS-SCNC: 10 MMOL/L (ref 7–15)
AST SERPL W P-5'-P-CCNC: 20 U/L (ref 0–45)
BILIRUB DIRECT SERPL-MCNC: <0.2 MG/DL (ref 0–0.3)
BILIRUB SERPL-MCNC: 0.2 MG/DL
BUN SERPL-MCNC: 8.6 MG/DL (ref 6–20)
CALCIUM SERPL-MCNC: 8.6 MG/DL (ref 8.6–10)
CHLORIDE SERPL-SCNC: 106 MMOL/L (ref 98–107)
COLONOSCOPY: NORMAL
CREAT SERPL-MCNC: 0.76 MG/DL (ref 0.67–1.17)
DEPRECATED HCO3 PLAS-SCNC: 27 MMOL/L (ref 22–29)
EGFRCR SERPLBLD CKD-EPI 2021: >90 ML/MIN/1.73M2
ERYTHROCYTE [DISTWIDTH] IN BLOOD BY AUTOMATED COUNT: 17 % (ref 10–15)
FASTING STATUS PATIENT QL REPORTED: YES
GLUCOSE SERPL-MCNC: 92 MG/DL (ref 70–99)
HCT VFR BLD AUTO: 35.5 % (ref 40–53)
HGB BLD-MCNC: 11.1 G/DL (ref 13.3–17.7)
INR PPP: 1.06 (ref 0.85–1.15)
MAGNESIUM SERPL-MCNC: 2.2 MG/DL (ref 1.7–2.3)
MCH RBC QN AUTO: 27.9 PG (ref 26.5–33)
MCHC RBC AUTO-ENTMCNC: 31.3 G/DL (ref 31.5–36.5)
MCV RBC AUTO: 89 FL (ref 78–100)
PHOSPHATE SERPL-MCNC: 4 MG/DL (ref 2.5–4.5)
PLATELET # BLD AUTO: 246 10E3/UL (ref 150–450)
POTASSIUM SERPL-SCNC: 4 MMOL/L (ref 3.4–5.3)
PROT SERPL-MCNC: 6.8 G/DL (ref 6.4–8.3)
RBC # BLD AUTO: 3.98 10E6/UL (ref 4.4–5.9)
SODIUM SERPL-SCNC: 143 MMOL/L (ref 136–145)
TRIGL SERPL-MCNC: 106 MG/DL
WBC # BLD AUTO: 7.2 10E3/UL (ref 4–11)

## 2023-09-19 PROCEDURE — 999N000142 HC STATISTIC PROCEDURE PREP ONLY

## 2023-09-19 PROCEDURE — 77001 FLUOROGUIDE FOR VEIN DEVICE: CPT | Mod: 26 | Performed by: RADIOLOGY

## 2023-09-19 PROCEDURE — C1769 GUIDE WIRE: HCPCS

## 2023-09-19 PROCEDURE — 45378 DIAGNOSTIC COLONOSCOPY: CPT | Performed by: INTERNAL MEDICINE

## 2023-09-19 PROCEDURE — 84478 ASSAY OF TRIGLYCERIDES: CPT

## 2023-09-19 PROCEDURE — 36581 REPLACE TUNNELED CV CATH: CPT | Mod: GC | Performed by: RADIOLOGY

## 2023-09-19 PROCEDURE — 250N000011 HC RX IP 250 OP 636: Mod: JZ

## 2023-09-19 PROCEDURE — 80053 COMPREHEN METABOLIC PANEL: CPT

## 2023-09-19 PROCEDURE — 36415 COLL VENOUS BLD VENIPUNCTURE: CPT | Performed by: NURSE PRACTITIONER

## 2023-09-19 PROCEDURE — 85041 AUTOMATED RBC COUNT: CPT | Performed by: NURSE PRACTITIONER

## 2023-09-19 PROCEDURE — 85610 PROTHROMBIN TIME: CPT | Performed by: NURSE PRACTITIONER

## 2023-09-19 PROCEDURE — 77001 FLUOROGUIDE FOR VEIN DEVICE: CPT

## 2023-09-19 PROCEDURE — 82248 BILIRUBIN DIRECT: CPT

## 2023-09-19 PROCEDURE — C1751 CATH, INF, PER/CENT/MIDLINE: HCPCS

## 2023-09-19 PROCEDURE — 36415 COLL VENOUS BLD VENIPUNCTURE: CPT

## 2023-09-19 PROCEDURE — 83735 ASSAY OF MAGNESIUM: CPT

## 2023-09-19 PROCEDURE — 85018 HEMOGLOBIN: CPT | Performed by: NURSE PRACTITIONER

## 2023-09-19 PROCEDURE — 84100 ASSAY OF PHOSPHORUS: CPT

## 2023-09-19 PROCEDURE — 999N000132 HC STATISTIC PP CARE STAGE 1

## 2023-09-19 PROCEDURE — 258N000003 HC RX IP 258 OP 636: Performed by: NURSE PRACTITIONER

## 2023-09-19 PROCEDURE — 250N000009 HC RX 250

## 2023-09-19 PROCEDURE — 250N000011 HC RX IP 250 OP 636: Mod: JZ | Performed by: NURSE PRACTITIONER

## 2023-09-19 RX ORDER — FLUMAZENIL 0.1 MG/ML
0.2 INJECTION, SOLUTION INTRAVENOUS
Status: DISCONTINUED | OUTPATIENT
Start: 2023-09-19 | End: 2023-09-19 | Stop reason: HOSPADM

## 2023-09-19 RX ORDER — LIDOCAINE HYDROCHLORIDE 10 MG/ML
1-30 INJECTION, SOLUTION EPIDURAL; INFILTRATION; INTRACAUDAL; PERINEURAL
Status: COMPLETED | OUTPATIENT
Start: 2023-09-19 | End: 2023-09-19

## 2023-09-19 RX ORDER — ONDANSETRON 2 MG/ML
4 INJECTION INTRAMUSCULAR; INTRAVENOUS EVERY 6 HOURS PRN
Status: DISCONTINUED | OUTPATIENT
Start: 2023-09-19 | End: 2023-09-20 | Stop reason: HOSPADM

## 2023-09-19 RX ORDER — ONDANSETRON 4 MG/1
4 TABLET, ORALLY DISINTEGRATING ORAL EVERY 6 HOURS PRN
Status: DISCONTINUED | OUTPATIENT
Start: 2023-09-19 | End: 2023-09-20 | Stop reason: HOSPADM

## 2023-09-19 RX ORDER — LIDOCAINE HYDROCHLORIDE 20 MG/ML
INJECTION, SOLUTION INFILTRATION; PERINEURAL PRN
Status: DISCONTINUED | OUTPATIENT
Start: 2023-09-19 | End: 2023-09-19

## 2023-09-19 RX ORDER — NALOXONE HYDROCHLORIDE 0.4 MG/ML
0.4 INJECTION, SOLUTION INTRAMUSCULAR; INTRAVENOUS; SUBCUTANEOUS
Status: DISCONTINUED | OUTPATIENT
Start: 2023-09-19 | End: 2023-09-20 | Stop reason: HOSPADM

## 2023-09-19 RX ORDER — PROPOFOL 10 MG/ML
INJECTION, EMULSION INTRAVENOUS CONTINUOUS PRN
Status: DISCONTINUED | OUTPATIENT
Start: 2023-09-19 | End: 2023-09-19

## 2023-09-19 RX ORDER — LIDOCAINE 40 MG/G
CREAM TOPICAL
Status: DISCONTINUED | OUTPATIENT
Start: 2023-09-19 | End: 2023-09-19 | Stop reason: HOSPADM

## 2023-09-19 RX ORDER — SODIUM CHLORIDE 9 MG/ML
INJECTION, SOLUTION INTRAVENOUS CONTINUOUS
Status: DISCONTINUED | OUTPATIENT
Start: 2023-09-19 | End: 2023-09-19 | Stop reason: HOSPADM

## 2023-09-19 RX ORDER — NALOXONE HYDROCHLORIDE 0.4 MG/ML
0.2 INJECTION, SOLUTION INTRAMUSCULAR; INTRAVENOUS; SUBCUTANEOUS
Status: DISCONTINUED | OUTPATIENT
Start: 2023-09-19 | End: 2023-09-20 | Stop reason: HOSPADM

## 2023-09-19 RX ORDER — HEPARIN SODIUM,PORCINE 10 UNIT/ML
5-20 VIAL (ML) INTRAVENOUS
Status: DISCONTINUED | OUTPATIENT
Start: 2023-09-19 | End: 2023-09-19 | Stop reason: HOSPADM

## 2023-09-19 RX ORDER — PROPOFOL 10 MG/ML
INJECTION, EMULSION INTRAVENOUS PRN
Status: DISCONTINUED | OUTPATIENT
Start: 2023-09-19 | End: 2023-09-19

## 2023-09-19 RX ORDER — PROCHLORPERAZINE MALEATE 10 MG
10 TABLET ORAL EVERY 6 HOURS PRN
Status: DISCONTINUED | OUTPATIENT
Start: 2023-09-19 | End: 2023-09-20 | Stop reason: HOSPADM

## 2023-09-19 RX ORDER — HEPARIN SODIUM,PORCINE 10 UNIT/ML
5-20 VIAL (ML) INTRAVENOUS EVERY 24 HOURS
Status: DISCONTINUED | OUTPATIENT
Start: 2023-09-19 | End: 2023-09-19 | Stop reason: HOSPADM

## 2023-09-19 RX ORDER — SODIUM CHLORIDE, SODIUM LACTATE, POTASSIUM CHLORIDE, CALCIUM CHLORIDE 600; 310; 30; 20 MG/100ML; MG/100ML; MG/100ML; MG/100ML
INJECTION, SOLUTION INTRAVENOUS CONTINUOUS PRN
Status: DISCONTINUED | OUTPATIENT
Start: 2023-09-19 | End: 2023-09-19

## 2023-09-19 RX ORDER — OXYCODONE HCL 10 MG/1
10 TABLET, FILM COATED, EXTENDED RELEASE ORAL EVERY 12 HOURS
Status: DISCONTINUED | OUTPATIENT
Start: 2023-09-19 | End: 2023-09-19

## 2023-09-19 RX ORDER — CEFAZOLIN SODIUM 2 G/100ML
2 INJECTION, SOLUTION INTRAVENOUS
Status: COMPLETED | OUTPATIENT
Start: 2023-09-19 | End: 2023-09-19

## 2023-09-19 RX ORDER — OXYCODONE HYDROCHLORIDE 5 MG/1
10 TABLET ORAL ONCE
Status: COMPLETED | OUTPATIENT
Start: 2023-09-19 | End: 2023-09-19

## 2023-09-19 RX ORDER — ONDANSETRON 2 MG/ML
4 INJECTION INTRAMUSCULAR; INTRAVENOUS
Status: DISCONTINUED | OUTPATIENT
Start: 2023-09-19 | End: 2023-09-19 | Stop reason: HOSPADM

## 2023-09-19 RX ORDER — HEPARIN SODIUM,PORCINE 10 UNIT/ML
5 VIAL (ML) INTRAVENOUS
Status: COMPLETED | OUTPATIENT
Start: 2023-09-19 | End: 2023-09-19

## 2023-09-19 RX ADMIN — LIDOCAINE HYDROCHLORIDE 60 MG: 20 INJECTION, SOLUTION INFILTRATION; PERINEURAL at 10:04

## 2023-09-19 RX ADMIN — SODIUM CHLORIDE, SODIUM LACTATE, POTASSIUM CHLORIDE, CALCIUM CHLORIDE: 600; 310; 30; 20 INJECTION, SOLUTION INTRAVENOUS at 09:45

## 2023-09-19 RX ADMIN — PROPOFOL 200 MCG/KG/MIN: 10 INJECTION, EMULSION INTRAVENOUS at 10:34

## 2023-09-19 RX ADMIN — Medication 5 ML: at 16:41

## 2023-09-19 RX ADMIN — CEFAZOLIN SODIUM 2 G: 2 INJECTION, SOLUTION INTRAVENOUS at 13:13

## 2023-09-19 RX ADMIN — LIDOCAINE HYDROCHLORIDE 5 ML: 10 INJECTION, SOLUTION EPIDURAL; INFILTRATION; INTRACAUDAL; PERINEURAL at 16:21

## 2023-09-19 RX ADMIN — OXYCODONE HYDROCHLORIDE 10 MG: 5 TABLET ORAL at 11:27

## 2023-09-19 RX ADMIN — PROPOFOL 200 MCG/KG/MIN: 10 INJECTION, EMULSION INTRAVENOUS at 10:04

## 2023-09-19 RX ADMIN — PROPOFOL 80 MG: 10 INJECTION, EMULSION INTRAVENOUS at 10:04

## 2023-09-19 RX ADMIN — SODIUM CHLORIDE: 9 INJECTION, SOLUTION INTRAVENOUS at 13:14

## 2023-09-19 RX ADMIN — ONDANSETRON 4 MG: 2 INJECTION INTRAMUSCULAR; INTRAVENOUS at 11:25

## 2023-09-19 ASSESSMENT — ACTIVITIES OF DAILY LIVING (ADL)
ADLS_ACUITY_SCORE: 37

## 2023-09-19 NOTE — ANESTHESIA CARE TRANSFER NOTE
Patient: Parker Acevedo    Procedure: Procedure(s):  Colonoscopy       Diagnosis: Gram-positive bacteremia [R78.81]  Diagnosis Additional Information: No value filed.    Anesthesia Type:   MAC     Note:    Oropharynx: oropharynx clear of all foreign objects and spontaneously breathing  Level of Consciousness: awake  Oxygen Supplementation: room air    Independent Airway: airway patency satisfactory and stable  Dentition: dentition unchanged  Vital Signs Stable: post-procedure vital signs reviewed and stable  Report to RN Given: handoff report given  Patient transferred to: Phase II    Handoff Report: Identifed the Patient, Identified the Reponsible Provider, Reviewed the pertinent medical history, Discussed the surgical course, Reviewed Intra-OP anesthesia mangement and issues during anesthesia, Set expectations for post-procedure period and Allowed opportunity for questions and acknowledgement of understanding      Vitals:  Vitals Value Taken Time   /83 09/19/23 1107   Temp     Pulse 82 09/19/23 1107   Resp 16 09/19/23 1107   SpO2 98 % 09/19/23 1107       Electronically Signed By: SAILAJA Mensah CRNA  September 19, 2023  11:09 AM

## 2023-09-19 NOTE — ANESTHESIA POSTPROCEDURE EVALUATION
Patient: Parker Acevedo    Procedure: Procedure(s):  Colonoscopy       Anesthesia Type:  MAC    Note:  Disposition: Outpatient   Postop Pain Control: Uneventful            Sign Out: Well controlled pain   PONV: No   Neuro/Psych: Uneventful            Sign Out: Acceptable/Baseline neuro status   Airway/Respiratory: Uneventful            Sign Out: Acceptable/Baseline resp. status   CV/Hemodynamics: Uneventful            Sign Out: Acceptable CV status; No obvious hypovolemia; No obvious fluid overload   Other NRE: NONE   DID A NON-ROUTINE EVENT OCCUR?            Last vitals:  Vitals Value Taken Time   /97 09/19/23 1143   Temp     Pulse 78 09/19/23 1143   Resp 15 09/19/23 1143   SpO2 97 % 09/19/23 1115       Electronically Signed By: Benton Elkins MD  September 19, 2023  12:00 PM

## 2023-09-19 NOTE — PROGRESS NOTES
Patient tolerated recovery stage well. VSS, left chest tunnel line catheter site clean/dry/intact, no hematoma, and denies pain. Teaching was done and discharge instructions were given. Patient ambulated and PIV was removed. Patient discharged from the hospital to home with family.

## 2023-09-19 NOTE — PROGRESS NOTES
Pt arrived for CVC tunneled line revision. VSS on RA, denies pain. Pt had colonoscopy this AM at the clinic, wife will have to sign consent for him. IR aware. PIV infusing pre-procedure abx and fluids. Labs pending. Consent needs to be signed. H&P current. Wife Rose at the bedside.

## 2023-09-19 NOTE — ANESTHESIA PREPROCEDURE EVALUATION
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    Complication of anesthesia     Difficulty swallowing     Gastric ulcer, unspecified as acute or chronic, without mention of hemorrhage, perforation, or obstruction     Gastro-oesophageal reflux disease     Head injury     Hiatal hernia     Other bladder disorder     Other chronic pain     PONV (postoperative nausea and vomiting)     Severe malnutrition (H)     TPN    Short gut syndrome     Tobacco abuse       Past Surgical History:   Procedure Laterality Date    AMPUTATION      APPENDECTOMY      BACK SURGERY  11/3/2014    curve in the spine    BIOPSY LYMPH NODE CERVICAL N/A 2015    Procedure: BIOPSY LYMPH NODE CERVICAL;  Surgeon: Baron Scanlon MD;  [facility]    CHOLECYSTECTOMY      COLONOSCOPY N/A 2021    Procedure: COLONOSCOPY;  Surgeon: Jimbo Estrada MD;  Location: [facility]    COLONOSCOPY N/A 2022    Procedure: COLONOSCOPY;  Surgeon: Jimbo Estrada MD;  Location: [facility]    DISCECTOMY, FUSION CERVICAL ANTERIOR ONE LEVEL, COMBINED N/A 2/15/2017    Procedure: COMBINED DISCECTOMY, FUSION CERVICAL ANTERIOR ONE LEVEL;  Surgeon: Darren Campos MD;  [facility]    ENDOSCOPIC INSERTION TUBE GASTROSTOMY  2013    Procedure: ENDOSCOPIC INSERTION TUBE GASTROSTOMY;;  Surgeon: Francis Vyas MD;  [facility]    ENDOSCOPIC ULTRASOUND UPPER GASTROINTESTINAL TRACT (GI)  2011    Procedure:ENDOSCOPIC ULTRASOUND UPPER GASTROINTESTINAL TRACT (GI); Both Procedures done Conjointly; Surgeon:NEREIDA HOUSER  LUIS; [facility]    ENDOSCOPIC ULTRASOUND UPPER GASTROINTESTINAL TRACT (GI)  9/9/2013    Procedure: ENDOSCOPIC ULTRASOUND UPPER GASTROINTESTINAL TRACT (GI);  Endoscopic Ultrasound Guide Gastrostomy Tube Placement  C-arm;  Surgeon: Noe Lizarraga MD;  [facility]    ENDOSCOPIC ULTRASOUND UPPER GASTROINTESTINAL TRACT (GI) N/A 2/24/2021    Procedure: ENDOSCOPIC ULTRASOUND, ESOPHAGOSCOPY / UPPER GASTROINTESTINAL TRACT (GI), esophagastrogastroduodenoscopy;  Surgeon: Berny Bach MD;  [facility]    ENDOSCOPY  03/25/11    EGD, [facility]    ENDOSCOPY  08/04/09    Upper Endoscopy, [facility]    ENDOSCOPY  01/05/09    Upper Endoscopy, [facility]    ESOPHAGOSCOPY, GASTROSCOPY, DUODENOSCOPY (EGD), COMBINED  4/20/2011    Procedure:COMBINED ESOPHAGOSCOPY, GASTROSCOPY, DUODENOSCOPY (EGD); Surgeon:BLU VYAS; [facility]    ESOPHAGOSCOPY, GASTROSCOPY, DUODENOSCOPY (EGD), COMBINED  6/15/2011    Procedure:COMBINED ESOPHAGOSCOPY, GASTROSCOPY, DUODENOSCOPY (EGD); Surgeon:BLU VYAS; [facility]    ESOPHAGOSCOPY, GASTROSCOPY, DUODENOSCOPY (EGD), COMBINED  6/12/2013    Procedure: COMBINED ESOPHAGOSCOPY, GASTROSCOPY, DUODENOSCOPY (EGD);;  Surgeon: Blu Vyas MD;  [facility]    ESOPHAGOSCOPY, GASTROSCOPY, DUODENOSCOPY (EGD), COMBINED  11/22/2013    Procedure: COMBINED ESOPHAGOSCOPY, GASTROSCOPY, DUODENOSCOPY (EGD);;  Surgeon: Blu Vyas MD;  [facility]    ESOPHAGOSCOPY, GASTROSCOPY, DUODENOSCOPY (EGD), COMBINED  4/30/2014    Procedure: COMBINED ESOPHAGOSCOPY, GASTROSCOPY, DUODENOSCOPY (EGD);  Surgeon: Blu Vyas MD;  [facility]    ESOPHAGOSCOPY, GASTROSCOPY, DUODENOSCOPY (EGD), COMBINED N/A 2/20/2015    Procedure: COMBINED ESOPHAGOSCOPY, GASTROSCOPY, DUODENOSCOPY (EGD), BIOPSY SINGLE OR MULTIPLE;  Surgeon: Baron Scanlon MD;  [facility]    ESOPHAGOSCOPY, GASTROSCOPY, DUODENOSCOPY (EGD), COMBINED N/A  9/30/2015    Procedure: COMBINED ESOPHAGOSCOPY, GASTROSCOPY, DUODENOSCOPY (EGD);  Surgeon: Francis Vyas MD;  Location:  GI    ESOPHAGOSCOPY, GASTROSCOPY, DUODENOSCOPY (EGD), COMBINED N/A 10/3/2019    Procedure: Upper Endoscopy;  Surgeon: Clif Morrow MD;  [facility]    GASTRECTOMY  6/22/2012    Procedure: GASTRECTOMY;  Open Approach, Excise Ulcers,Partial Gastrectomy, Esophagojejunostomy, Hiatal Hernia Repair, Extensive Lysis of Adhesions and Esaphagogastrodudenoscopy.;  Surgeon: Francis Vyas MD;  [facility]    GASTROJEJUNOSTOMY  08/26/09    Extensice enterolysis, partial resect. jejunum, part. resect gastric pouch, gastrojejunostomy anastomosis    HC ESOPH/GAS REFLUX TEST W NASAL IMPED ELECTRODE  8/5/2013    Procedure: ESOPHAGEAL IMPEDENCE FUNCTION TEST 1 HOUR OR LESS;  Surgeon: Halie Lang MD;  Location:  GI    HEAD & NECK SURGERY  2/15/2017    C5-C6    HERNIA REPAIR  2006    Umbilical hernia    HERNIORRHAPHY HIATAL  6/22/2012    Procedure: HERNIORRHAPHY HIATAL;;  Surgeon: Francis Vyas MD;  [facility]    HERNIORRHAPHY INGUINAL  11/22/2013    Procedure: HERNIORRHAPHY INGUINAL;;  Surgeon: Francis Vyas MD;  [facility]    INSERT PICC LINE Right 12/19/2019    Procedure: Picc Placement;  Surgeon: Per Dumont PA-C;  [facility]    INSERT PICC LINE Right 2/21/2020    Procedure: INSERTION, PICC;  Surgeon: Per Dumont PA-C;  [facility]    INSERT PORT VASCULAR ACCESS Right 12/19/2017    Procedure: INSERT PORT VASCULAR ACCESS;  Right Chest Port Placement ;  Surgeon: Lisandro Alejandro PA-C;  [facility]    INSERT PORT VASCULAR ACCESS Right 8/2/2018    Procedure: INSERT PORT VASCULAR ACCESS;  Place single lumen tunneled central venous access catheter;  Surgeon: Guy Jamil PA-C;  [facility]    IR CVC TUNNEL PLACEMENT > 5 YRS OF AGE  8/7/2019    IR CVC TUNNEL PLACEMENT > 5 YRS OF AGE  4/14/2020    IR  CVC TUNNEL PLACEMENT > 5 YRS OF AGE  8/3/2020    IR CVC TUNNEL PLACEMENT > 5 YRS OF AGE  9/4/2020    IR CVC TUNNEL PLACEMENT > 5 YRS OF AGE  2/5/2021    IR CVC TUNNEL PLACEMENT > 5 YRS OF AGE  3/23/2021    IR CVC TUNNEL PLACEMENT > 5 YRS OF AGE  1/13/2022    IR CVC TUNNEL PLACEMENT > 5 YRS OF AGE  5/12/2022    IR CVC TUNNEL PLACEMENT > 5 YRS OF AGE  7/13/2022    IR CVC TUNNEL PLACEMENT > 5 YRS OF AGE  7/10/2023    IR CVC TUNNEL REMOVAL LEFT  6/7/2023    IR CVC TUNNEL REMOVAL RIGHT  10/1/2019    IR CVC TUNNEL REMOVAL RIGHT  7/30/2020    IR CVC TUNNEL REMOVAL RIGHT  9/2/2020    IR CVC TUNNEL REMOVAL RIGHT  2/3/2021    IR CVC TUNNEL REMOVAL RIGHT  3/19/2021    IR CVC TUNNEL REMOVAL RIGHT  1/10/2022    IR CVC TUNNEL REMOVAL RIGHT  5/9/2022    IR CVC TUNNEL REMOVAL RIGHT  7/8/2022    IR CVC TUNNEL REVISION LEFT  8/10/2022    IR CVC TUNNEL REVISION RIGHT  5/7/2021    IR FOLLOW UP VISIT OUTPATIENT  8/7/2019    IR PICC EXCHANGE LEFT  6/8/2023    IR PICC PLACEMENT > 5 YRS OF AGE  3/7/2019    IR PICC PLACEMENT > 5 YRS OF AGE  12/19/2019    IR PICC PLACEMENT > 5 YRS OF AGE  2/21/2020    IR PICC PLACEMENT > 5 YRS OF AGE  6/7/2023    LAPAROTOMY EXPLORATORY  11/22/2013    Procedure: LAPAROTOMY EXPLORATORY;  Exploratory Laparotomy, Upper Endoscopy, Left Inguinal Hernia Repair;  Surgeon: Francis Vyas MD;  [facility]    ORTHOPEDIC SURGERY      PICC INSERTION Right 03/16/2017    5fr DL BioFlo PICC, 42cm (3cm external) in the R medial brachial vein w/ tip in the SVC RA junction.    PICC INSERTION Left 09/23/2017    5fr DL BioFlo PICC, 45cm (1cm external) in the L basilic vein w/ tip in the SVC RA junction.    PICC INSERTION Right 05/16/2019    5Fr - 43cm, Medial brachial vein, low SVC    PICC INSERTION Right 10/02/2019    5Fr - 43cm (2cm external), basilic vein, low SVC    SHAYLEE EN Y BOWEL  2003    SOFT TISSUE SURGERY      THORACIC SURGERY      TONSILLECTOMY      TRANSESOPHAGEAL ECHOCARDIOGRAM INTRAOPERATIVE N/A 1/8/2019     Procedure: TRANSESOPHAGEAL ECHOCARDIOGRAM INTRAOPERATIVE;  Surgeon: GENERIC ANESTHESIA PROVIDER;  [facility]    TRANSESOPHAGEAL ECHOCARDIOGRAM INTRAOPERATIVE N/A 7/7/2023    Procedure: Transesophageal echocardiogram in the OR;  Surgeon: GENERIC ANESTHESIA PROVIDER;  [facility]    ZZC GASTRIC BYPASS,OBESE<100CM SHAYLEE-EN-Y  2002    lost 300 pounds      Allergies   Allergen Reactions    Bactrim [Sulfamethoxazole-Trimethoprim] Rash    Penicillins Anaphylaxis     Please see Antimicrobial Management Team allergy assessment note 10/10/2018. Patient reported tolerating amoxicillin.  Tolerating cefepime and ceftriaxone without reaction 6/23    Ertapenem Nausea and Vomiting    Doxycycline Rash    Vancomycin Rash     Rash after receiving vancomycin 3/28/16 (infusion reaction?). Tolerated with slower infusion and diphenhydramine premed.  Tolerated 1250mg over 90minutes 7/2023.      Social History     Tobacco Use    Smoking status: Light Smoker     Packs/day: 0.50     Years: 3.00     Pack years: 1.50     Types: Cigarettes    Smokeless tobacco: Never    Tobacco comments:     2/4/2021    smokes 3 cigarettes/day   Substance Use Topics    Alcohol use: No     Comment: quit 2002      Wt Readings from Last 1 Encounters:   09/18/23 90.3 kg (199 lb)        Anesthesia Evaluation    Type: General and MAC.        ROS/MED HX  ENT/Pulmonary:       Neurologic:       Cardiovascular:     (+)  hypertension- -   -  - -                                 Previous cardiac testing   Echo: Date: 7/22 Results:  Interpretation Summary  Left ventricular function is decreased. The ejection fraction is 45-50%  (mildly reduced).  Global right ventricular function is borderline reduced.  No significant valvular abnormalities present. No obvious valvular vegetations  seen.  Dilation of the inferior vena cava is present with abnormal respiratory  variation in diameter.    RONEL 2023 without vegetation  Stress Test:  Date: Results:    ECG Reviewed:   Date: Results:    Cath:  Date: Results:      METS/Exercise Tolerance:     Hematologic:       Musculoskeletal:       GI/Hepatic:     (+) GERD,     hiatal hernia,              Renal/Genitourinary:       Endo:    : s/p gastric bypass; has short gut syndrome, TPN dependent.   Psychiatric/Substance Use:       Infectious Disease:       Malignancy:       Other:            Physical Exam    Airway        Mallampati: II       Respiratory Devices and Support         Dental       (+) Minor Abnormalities - some fillings, tiny chips      Cardiovascular          Rhythm and rate: regular     Pulmonary           breath sounds clear to auscultation           OUTSIDE LABS:  CBC:   Lab Results   Component Value Date    WBC 6.9 08/22/2023    WBC 7.2 08/15/2023    HGB 11.6 (L) 08/22/2023    HGB 11.7 (L) 08/15/2023    HCT 36.5 (L) 08/22/2023    HCT 37.5 (L) 08/15/2023     08/22/2023     08/15/2023     BMP:   Lab Results   Component Value Date     08/15/2023     08/08/2023    POTASSIUM 3.7 08/15/2023    POTASSIUM 3.8 08/08/2023    CHLORIDE 107 08/15/2023    CHLORIDE 106 08/08/2023    CO2 25 08/15/2023    CO2 26 08/08/2023    BUN 9.3 08/15/2023    BUN 11.5 08/08/2023    CR 0.69 08/15/2023    CR 0.80 08/08/2023    GLC 84 08/15/2023    GLC 81 08/08/2023     COAGS:   Lab Results   Component Value Date    PTT 28 01/21/2023    INR 1.09 08/07/2023    FIBR 345 07/07/2022     POC:   Lab Results   Component Value Date    BGM 83 03/23/2021     HEPATIC:   Lab Results   Component Value Date    ALBUMIN 3.9 08/15/2023    PROTTOTAL 6.9 08/15/2023    ALT 18 08/15/2023    AST 20 08/15/2023    ALKPHOS 85 08/15/2023    BILITOTAL 0.3 08/15/2023     OTHER:   Lab Results   Component Value Date    LACT 0.6 (L) 08/06/2023    A1C 4.9 07/23/2013    SILAS 8.8 08/15/2023    PHOS 3.1 08/15/2023    MAG 2.0 08/15/2023    LIPASE 20 05/28/2023    AMYLASE 178 (H) 06/28/2012    TSH 4.06 07/07/2022    CRP <2.9 10/04/2022    SED 17 07/04/2023        Anesthesia Plan    ASA Status:  3    NPO Status:  NPO Appropriate    Anesthesia Type: MAC.     - Reason for MAC: immobility needed              Consents    Anesthesia Plan(s) and associated risks, benefits, and realistic alternatives discussed. Questions answered and patient/representative(s) expressed understanding.     - Discussed:     - Discussed with:  Patient            Postoperative Care            Comments:                Benton Elkins MD

## 2023-09-19 NOTE — PROCEDURES
[facility]    Procedure: IR Procedure Note    Date/Time: 9/19/2023 4:55 PM    Performed by: Lisandro Wren MD  Authorized by: Lisandro Wren MD  IR Fellow Physician: Lisandro Wren      UNIVERSAL PROTOCOL   Site Marked: NA  Prior Images Obtained and Reviewed:  Yes  Required items: Required blood products, implants, devices and special equipment available    Patient identity confirmed:  Verbally with patient, arm band, provided demographic data and hospital-assigned identification number  Patient was reevaluated immediately before administering moderate or deep sedation or anesthesia  Confirmation Checklist:  Patient's identity using two indicators, relevant allergies, procedure was appropriate and matched the consent or emergent situation and correct equipment/implants were available  Time out: Immediately prior to the procedure a time out was called    Universal Protocol: the Joint Commission Universal Protocol was followed    Preparation: Patient was prepped and draped in usual sterile fashion    ESBL (mL):  1     ANESTHESIA    Anesthesia: Local infiltration  Local Anesthetic:  Lidocaine 1% without epinephrine  Anesthetic Total (mL):  6      SEDATION  Patient Sedated: Yes    Sedation Type:  Moderate (conscious) sedation  Sedation:  Fentanyl and midazolam  Vital signs: Vital signs monitored during sedation    See dictated procedure note for full details.  Findings: See dictation in imaging tab of chart review.    Specimens: none    Complications: None    Condition: Stable    Plan: Return to 2A. Bedrest for 1 hour then ok to discharge. Line ready for immediate use.      PROCEDURE  Describe Procedure: Successful replacement of left sided 6Fr double lumen azevedo over the wire.  Patient Tolerance:  Patient tolerated the procedure well with no immediate complications  Length of time physician/provider present for 1:1 monitoring during sedation: 30

## 2023-09-19 NOTE — DISCHARGE INSTRUCTIONS
Discharge Instructions after Colonoscopy  or Sigmoidoscopy    Today you had a Colonoscopy     Activity and Diet  You were given medicine for pain. You may be dizzy or sleepy.  For 24 hours:   Do not drive or use heavy equipment.   Do not make important decisions.   Do not drink any alcohol.  You may return to your normal diet and medicines.    Discomfort   Air was placed in your colon during the exam in order to see it. Walking helps to pass the air.   You may take Tylenol (acetaminophen) for pain unless your doctor has told you not to.  Do not take aspirin or ibuprofen (Advil, Motrin, or other anti-inflammatory  drugs) for _____ days.    Follow-up  ____ We took small tissue samples or polyps to study. Your doctor will call you with the results  within two weeks.    When to call:    Call right away if you have:   Unusual pain in belly or chest pain not relieved with passing air.   More than 1 to 2 Tablespoons of bleeding from your rectum.   Fever above 100.6  F (37.5  C).    If you have severe pain, bleeding, or shortness of breath, go to an emergency room.    If you have questions, call:  Monday to Friday, 8 a.m. to 4:30 p.m.  Central Scheduling Department: [phone]    After hours  Hospital: [phone] (Ask for the GI fellow on call)

## 2023-09-19 NOTE — DISCHARGE INSTRUCTIONS
Interventional Radiology                 Discharge Instructions                       Following Tunneled Catheter Placement    Your site(s) has been closed with:     The Catheter is sutured  to skin at  insertion site    Derma Peña (Skin Glue) on neck incision  Do not apply any ointments over site  This thin layer will slough off in 7-10 days               May gently remove Derma Peña in 10 days if still present         Tunneled catheter is always covered with a Sterile Transparent dressing  Keep site clean and dry   Cover with an occlusive dressing for showering  Weekly transparent dressing changes or when it becomes wet or dirty     If there is any oozing or bleeding from the site, apply direct pressure for 5-10 minutes with a gauze pad.  If bleeding continues after 10 minutes call your primary doctor.  If bleeding cannot be controlled with direct pressure, call 911.    Call your Doctor if:  Bleeding as above  Swelling in your neck or arm  Sudden onset of shortness of breath, lightheadedness, or heart palpitations..  Fever greater than 100.5  F  Other signs of infection such as, redness, tenderness, or drainage from the wound.    If you were given sedation:  We recommend an adult stay with you for the first 24 hours.  No driving or alcoholic beverages for 24 hours.    ADDITIONAL INSTRUCTIONS:           If you are on Coumadin you may restart tonight.  Follow up Coumadin Clinic or Primary Care MD         To have your INR rechecked.           No heavy lifting greater than 10 lbs for 3 days.           May use ice pack for pain or minor swelling for 15 min 3-4 times per day.             *Plastic Clamps given    [facility] Interventional Radiology Department    Physician:  Dr. Fong, Dr. Lizbeth Bass                             Date:September 19, 2023  Telephone Numbers:  [phone]      Monday-Friday 7:30 am to 4:00 pm  Hospital : [phone]....After hours, Weekends, & Holidays.  Ask for the  Interventional Radiologist on call.  Someone is on call 24 hours a day.   Emergency Department:  [phone]

## 2023-09-20 ENCOUNTER — PATIENT OUTREACH (OUTPATIENT)
Dept: CARE COORDINATION | Facility: CLINIC | Age: 53
End: 2023-09-20
Payer: COMMERCIAL

## 2023-09-20 NOTE — PROGRESS NOTES
Clinic Care Coordination Contact    Situation: Patient chart reviewed by care coordinator.    Background: Patient is actively enrolled in care coordination.    Assessment: Patient had a planned procedure with IR. Everything went as planned and patient was sent home in stable condition.     Plan/Recommendations: RN CC will reach out to patient at next scheduled outreach.     Kinsey Muhammad RN Care Coordination   [facility]  Email: [email or web address]  Phone: [phone]

## 2023-09-21 ENCOUNTER — TELEPHONE (OUTPATIENT)
Dept: INTERVENTIONAL RADIOLOGY/VASCULAR | Facility: CLINIC | Age: 53
End: 2023-09-21
Payer: COMMERCIAL

## 2023-09-21 DIAGNOSIS — Z98.84 S/P BARIATRIC SURGERY: ICD-10-CM

## 2023-09-21 DIAGNOSIS — G89.29 CHRONIC ABDOMINAL PAIN: ICD-10-CM

## 2023-09-21 DIAGNOSIS — R19.8 VISCERAL HYPERALGESIA: ICD-10-CM

## 2023-09-21 DIAGNOSIS — G89.4 CHRONIC PAIN SYNDROME: ICD-10-CM

## 2023-09-21 DIAGNOSIS — R10.9 CHRONIC ABDOMINAL PAIN: ICD-10-CM

## 2023-09-21 DIAGNOSIS — M54.50 CHRONIC BILATERAL LOW BACK PAIN WITHOUT SCIATICA: ICD-10-CM

## 2023-09-21 DIAGNOSIS — G89.29 CHRONIC BILATERAL LOW BACK PAIN WITHOUT SCIATICA: ICD-10-CM

## 2023-09-21 DIAGNOSIS — I82.90 VTE (VENOUS THROMBOEMBOLISM): ICD-10-CM

## 2023-09-21 DIAGNOSIS — M54.2 CERVICALGIA: ICD-10-CM

## 2023-09-21 DIAGNOSIS — F11.90 CHRONIC, CONTINUOUS USE OF OPIOIDS: ICD-10-CM

## 2023-09-21 DIAGNOSIS — R78.81 GRAM-POSITIVE BACTEREMIA: ICD-10-CM

## 2023-09-21 RX ORDER — PANTOPRAZOLE SODIUM 40 MG/1
40 TABLET, DELAYED RELEASE ORAL DAILY
Qty: 30 TABLET | Refills: 5 | Status: SHIPPED | OUTPATIENT
Start: 2023-09-21 | End: 2024-06-15

## 2023-09-21 RX ORDER — ENOXAPARIN SODIUM 100 MG/ML
INJECTION SUBCUTANEOUS
Qty: 67.2 ML | Refills: 0 | Status: SHIPPED | OUTPATIENT
Start: 2023-09-21 | End: 2023-10-22

## 2023-09-21 NOTE — TELEPHONE ENCOUNTER
Spoke with: Wife (Rose)  Procedure done: 9/19 tunneled line exchange  Any pain: No  Any fever: No  Any redness/swelling/abnormal drainage around puncture site: No  Were you instructed well enough to take care of yourself at home: Yes  Are you satisfied with the care you received: Yes  Any additional concerns or questions: No      Post call completed.   September 21, 2023 10:05 AM  Yuli Wu RN  Interventional Radiology  [phone]

## 2023-09-21 NOTE — TELEPHONE ENCOUNTER
Patient requesting refill(s) of oxyCODONE (ROXICODONE) 5 MG/5ML solution      Last dispensed from pharmacy on 08/29/23     Patient's last office/virtual visit by prescribing provider on 8/23/23  Next office/virtual appointment scheduled for 11/01/23     Last urine drug screen date 11/09/22  Current opioid agreement on file (completed within the last year) Yes Date of opioid agreement: 11/11/22     E-prescribe to [facility] - [place], [place]      Will route to nursing [place] for review and preparation of prescription(s).

## 2023-09-21 NOTE — TELEPHONE ENCOUNTER
Medication refill information reviewed.     Due date for fentaNYL (DURAGESIC) 25 mcg/hr 72 hr patch   is 9/30/2023     Prescriptions prepped for review.     Will route to provider.     Estelle Barnes RN  [facility]  [phone]

## 2023-09-21 NOTE — TELEPHONE ENCOUNTER
Patient requesting refill(s) of fentaNYL (DURAGESIC) 25 mcg/hr 72 hr patch     Last dispensed from pharmacy on 08/29/23    Patient's last office/virtual visit by prescribing provider on 8/23/23  Next office/virtual appointment scheduled for 11/01/23    Last urine drug screen date 11/09/22  Current opioid agreement on file (completed within the last year) Yes Date of opioid agreement: 11/11/22    E-prescribe to [facility] - [place], [place]     Will route to nursing [place] for review and preparation of prescription(s).

## 2023-09-22 RX ORDER — OXYCODONE HCL 5 MG/5 ML
5-10 SOLUTION, ORAL ORAL EVERY 4 HOURS PRN
Qty: 1200 ML | Refills: 0 | Status: SHIPPED | OUTPATIENT
Start: 2023-09-22 | End: 2023-10-20

## 2023-09-22 RX ORDER — FENTANYL 25 UG/1
1 PATCH TRANSDERMAL
Qty: 15 PATCH | Refills: 0 | Status: SHIPPED | OUTPATIENT
Start: 2023-09-22 | End: 2023-10-20

## 2023-09-22 NOTE — TELEPHONE ENCOUNTER
Signed Prescriptions:                        Disp   Refills    oxyCODONE (ROXICODONE) 5 MG/5ML solution   1200 mL0        Sig: Take 5-10 mLs (5-10 mg) by mouth every 4 hours as           needed for moderate to severe pain Max of           40mg/day. Fill 09/28/23 and start 09/30/23. 30           day supply for chronic pain.  Authorizing Provider: NATALIE MCDOWELL        Reviewed [place]  September 22, 2023- no concerning fills.    Natalie MENARD, RN CNP, FNP  [facility]  [place]

## 2023-09-22 NOTE — TELEPHONE ENCOUNTER
Medication refill information reviewed.     Due date for oxyCODONE (ROXICODONE) 5 MG/5ML solution     is 9/30/2023     Prescriptions prepped for review.     Will route to provider.

## 2023-09-22 NOTE — TELEPHONE ENCOUNTER
Signed Prescriptions:                        Disp   Refills    fentaNYL (DURAGESIC) 25 mcg/hr 72 hr patch 15 pat*0        Sig: Place 1 patch onto the skin every 48 hours Fill           09/28/23 and start 09/30/23. 30 day supply for           chronic pain.  Authorizing Provider: NATALIE MCDOWELL        Reviewed [place]  September 22, 2023- no concerning fills.    Natalie MENARD, RN CNP, FNP  [facility]  [facility]

## 2023-09-25 RX ORDER — ONDANSETRON 4 MG/1
TABLET, FILM COATED ORAL
Qty: 3 TABLET | Refills: 0 | OUTPATIENT
Start: 2023-09-25

## 2023-09-28 ENCOUNTER — TELEPHONE (OUTPATIENT)
Dept: SURGERY | Facility: CLINIC | Age: 53
End: 2023-09-28
Payer: COMMERCIAL

## 2023-09-28 NOTE — TELEPHONE ENCOUNTER
Orders for home care received and signed by Dr. Vyas.  Signed orders faxed back.  Left message for [facility] that orders were faxed.

## 2023-09-28 NOTE — TELEPHONE ENCOUNTER
General Call    Contacts         Type Contact Phone/Fax    09/28/2023 09:45 AM CDT Phone (Incoming) [facility] (Care Coordinator) [phone]          Reason for Call:  [facility] home care nurse, regarding faxed order, 9/20, plan of care order.  Received? Please call      Her cell, [phone] secured VM

## 2023-09-29 ENCOUNTER — APPOINTMENT (OUTPATIENT)
Dept: CT IMAGING | Facility: CLINIC | Age: 53
End: 2023-09-29
Attending: FAMILY MEDICINE
Payer: COMMERCIAL

## 2023-09-29 ENCOUNTER — HOSPITAL ENCOUNTER (EMERGENCY)
Facility: CLINIC | Age: 53
Discharge: OTHER INSTITUTION | End: 2023-09-30
Attending: FAMILY MEDICINE | Admitting: FAMILY MEDICINE
Payer: COMMERCIAL

## 2023-09-29 DIAGNOSIS — K56.609 SMALL BOWEL OBSTRUCTION (H): ICD-10-CM

## 2023-09-29 DIAGNOSIS — K56.1 JEJUNAL INTUSSUSCEPTION (H): ICD-10-CM

## 2023-09-29 LAB
ABO/RH(D): NORMAL
ALBUMIN SERPL BCG-MCNC: 3.6 G/DL (ref 3.5–5.2)
ALP SERPL-CCNC: 83 U/L (ref 40–129)
ALT SERPL W P-5'-P-CCNC: 13 U/L (ref 0–70)
ANION GAP SERPL CALCULATED.3IONS-SCNC: 12 MMOL/L (ref 7–15)
ANTIBODY SCREEN: NEGATIVE
APTT PPP: 25 SECONDS (ref 22–38)
AST SERPL W P-5'-P-CCNC: 15 U/L (ref 0–45)
BASOPHILS # BLD AUTO: 0 10E3/UL (ref 0–0.2)
BASOPHILS NFR BLD AUTO: 0 %
BILIRUB SERPL-MCNC: 1 MG/DL
BUN SERPL-MCNC: 22.1 MG/DL (ref 6–20)
CALCIUM SERPL-MCNC: 8.5 MG/DL (ref 8.6–10)
CHLORIDE SERPL-SCNC: 102 MMOL/L (ref 98–107)
CREAT SERPL-MCNC: 0.72 MG/DL (ref 0.67–1.17)
DEPRECATED HCO3 PLAS-SCNC: 23 MMOL/L (ref 22–29)
EGFRCR SERPLBLD CKD-EPI 2021: >90 ML/MIN/1.73M2
EOSINOPHIL # BLD AUTO: 0 10E3/UL (ref 0–0.7)
EOSINOPHIL NFR BLD AUTO: 0 %
ERYTHROCYTE [DISTWIDTH] IN BLOOD BY AUTOMATED COUNT: 18.6 % (ref 10–15)
GLUCOSE SERPL-MCNC: 135 MG/DL (ref 70–99)
HCT VFR BLD AUTO: 37.6 % (ref 40–53)
HGB BLD-MCNC: 12.2 G/DL (ref 13.3–17.7)
HOLD SPECIMEN: NORMAL
IMM GRANULOCYTES # BLD: 0.1 10E3/UL
IMM GRANULOCYTES NFR BLD: 0 %
INR PPP: 1.1 (ref 0.85–1.15)
LIPASE SERPL-CCNC: 11 U/L (ref 13–60)
LYMPHOCYTES # BLD AUTO: 1.1 10E3/UL (ref 0.8–5.3)
LYMPHOCYTES NFR BLD AUTO: 6 %
MCH RBC QN AUTO: 27.6 PG (ref 26.5–33)
MCHC RBC AUTO-ENTMCNC: 32.4 G/DL (ref 31.5–36.5)
MCV RBC AUTO: 85 FL (ref 78–100)
MONOCYTES # BLD AUTO: 1 10E3/UL (ref 0–1.3)
MONOCYTES NFR BLD AUTO: 6 %
NEUTROPHILS # BLD AUTO: 15.6 10E3/UL (ref 1.6–8.3)
NEUTROPHILS NFR BLD AUTO: 88 %
NRBC # BLD AUTO: 0 10E3/UL
NRBC BLD AUTO-RTO: 0 /100
PLATELET # BLD AUTO: 212 10E3/UL (ref 150–450)
POTASSIUM SERPL-SCNC: 3.9 MMOL/L (ref 3.4–5.3)
PROT SERPL-MCNC: 6.6 G/DL (ref 6.4–8.3)
RBC # BLD AUTO: 4.42 10E6/UL (ref 4.4–5.9)
SODIUM SERPL-SCNC: 137 MMOL/L (ref 135–145)
SPECIMEN EXPIRATION DATE: NORMAL
WBC # BLD AUTO: 17.9 10E3/UL (ref 4–11)

## 2023-09-29 PROCEDURE — 36415 COLL VENOUS BLD VENIPUNCTURE: CPT | Performed by: FAMILY MEDICINE

## 2023-09-29 PROCEDURE — 258N000003 HC RX IP 258 OP 636: Performed by: FAMILY MEDICINE

## 2023-09-29 PROCEDURE — 85014 HEMATOCRIT: CPT | Performed by: FAMILY MEDICINE

## 2023-09-29 PROCEDURE — 96375 TX/PRO/DX INJ NEW DRUG ADDON: CPT | Performed by: FAMILY MEDICINE

## 2023-09-29 PROCEDURE — 96376 TX/PRO/DX INJ SAME DRUG ADON: CPT | Performed by: FAMILY MEDICINE

## 2023-09-29 PROCEDURE — 99285 EMERGENCY DEPT VISIT HI MDM: CPT | Performed by: FAMILY MEDICINE

## 2023-09-29 PROCEDURE — 83690 ASSAY OF LIPASE: CPT | Performed by: FAMILY MEDICINE

## 2023-09-29 PROCEDURE — 86901 BLOOD TYPING SEROLOGIC RH(D): CPT | Performed by: FAMILY MEDICINE

## 2023-09-29 PROCEDURE — 80053 COMPREHEN METABOLIC PANEL: CPT | Performed by: FAMILY MEDICINE

## 2023-09-29 PROCEDURE — 99285 EMERGENCY DEPT VISIT HI MDM: CPT | Mod: 25 | Performed by: FAMILY MEDICINE

## 2023-09-29 PROCEDURE — 74177 CT ABD & PELVIS W/CONTRAST: CPT

## 2023-09-29 PROCEDURE — 250N000009 HC RX 250: Performed by: FAMILY MEDICINE

## 2023-09-29 PROCEDURE — 86850 RBC ANTIBODY SCREEN: CPT | Performed by: FAMILY MEDICINE

## 2023-09-29 PROCEDURE — 96374 THER/PROPH/DIAG INJ IV PUSH: CPT | Mod: 59 | Performed by: FAMILY MEDICINE

## 2023-09-29 PROCEDURE — 96361 HYDRATE IV INFUSION ADD-ON: CPT | Performed by: FAMILY MEDICINE

## 2023-09-29 PROCEDURE — 85610 PROTHROMBIN TIME: CPT | Performed by: FAMILY MEDICINE

## 2023-09-29 PROCEDURE — 85730 THROMBOPLASTIN TIME PARTIAL: CPT | Performed by: FAMILY MEDICINE

## 2023-09-29 PROCEDURE — 250N000011 HC RX IP 250 OP 636: Performed by: FAMILY MEDICINE

## 2023-09-29 RX ORDER — ONDANSETRON 2 MG/ML
4 INJECTION INTRAMUSCULAR; INTRAVENOUS EVERY 30 MIN PRN
Status: DISCONTINUED | OUTPATIENT
Start: 2023-09-29 | End: 2023-09-30 | Stop reason: HOSPADM

## 2023-09-29 RX ORDER — DIPHENHYDRAMINE HYDROCHLORIDE 50 MG/ML
25 INJECTION INTRAMUSCULAR; INTRAVENOUS ONCE
Status: COMPLETED | OUTPATIENT
Start: 2023-09-29 | End: 2023-09-29

## 2023-09-29 RX ORDER — HYDROMORPHONE HYDROCHLORIDE 1 MG/ML
0.5 INJECTION, SOLUTION INTRAMUSCULAR; INTRAVENOUS; SUBCUTANEOUS EVERY 30 MIN PRN
Status: COMPLETED | OUTPATIENT
Start: 2023-09-29 | End: 2023-09-29

## 2023-09-29 RX ORDER — IOPAMIDOL 755 MG/ML
500 INJECTION, SOLUTION INTRAVASCULAR ONCE
Status: COMPLETED | OUTPATIENT
Start: 2023-09-29 | End: 2023-09-29

## 2023-09-29 RX ADMIN — ONDANSETRON 4 MG: 2 INJECTION INTRAMUSCULAR; INTRAVENOUS at 21:48

## 2023-09-29 RX ADMIN — IOPAMIDOL 95 ML: 755 INJECTION, SOLUTION INTRAVENOUS at 22:48

## 2023-09-29 RX ADMIN — HYDROMORPHONE HYDROCHLORIDE 0.5 MG: 1 INJECTION, SOLUTION INTRAMUSCULAR; INTRAVENOUS; SUBCUTANEOUS at 22:30

## 2023-09-29 RX ADMIN — ONDANSETRON 4 MG: 2 INJECTION INTRAMUSCULAR; INTRAVENOUS at 22:32

## 2023-09-29 RX ADMIN — HYDROMORPHONE HYDROCHLORIDE 0.5 MG: 1 INJECTION, SOLUTION INTRAMUSCULAR; INTRAVENOUS; SUBCUTANEOUS at 20:25

## 2023-09-29 RX ADMIN — HYDROMORPHONE HYDROCHLORIDE 0.5 MG: 1 INJECTION, SOLUTION INTRAMUSCULAR; INTRAVENOUS; SUBCUTANEOUS at 21:37

## 2023-09-29 RX ADMIN — PROCHLORPERAZINE EDISYLATE 10 MG: 5 INJECTION INTRAMUSCULAR; INTRAVENOUS at 23:10

## 2023-09-29 RX ADMIN — SODIUM CHLORIDE 1000 ML: 9 INJECTION, SOLUTION INTRAVENOUS at 20:27

## 2023-09-29 RX ADMIN — PROCHLORPERAZINE EDISYLATE 10 MG: 5 INJECTION INTRAMUSCULAR; INTRAVENOUS at 20:25

## 2023-09-29 RX ADMIN — DIPHENHYDRAMINE HYDROCHLORIDE 25 MG: 50 INJECTION, SOLUTION INTRAMUSCULAR; INTRAVENOUS at 23:10

## 2023-09-29 RX ADMIN — SODIUM CHLORIDE 64 ML: 9 INJECTION, SOLUTION INTRAVENOUS at 22:48

## 2023-09-29 ASSESSMENT — ACTIVITIES OF DAILY LIVING (ADL)
ADLS_ACUITY_SCORE: 37
ADLS_ACUITY_SCORE: 37

## 2023-09-30 ENCOUNTER — ANESTHESIA EVENT (OUTPATIENT)
Dept: SURGERY | Facility: CLINIC | Age: 53
DRG: 329 | End: 2023-09-30
Payer: COMMERCIAL

## 2023-09-30 ENCOUNTER — HOSPITAL ENCOUNTER (INPATIENT)
Facility: CLINIC | Age: 53
LOS: 3 days | Discharge: HOME | DRG: 329 | End: 2023-10-03
Attending: EMERGENCY MEDICINE | Admitting: SURGERY
Payer: COMMERCIAL

## 2023-09-30 ENCOUNTER — ANESTHESIA (OUTPATIENT)
Dept: SURGERY | Facility: CLINIC | Age: 53
DRG: 329 | End: 2023-09-30
Payer: COMMERCIAL

## 2023-09-30 VITALS
SYSTOLIC BLOOD PRESSURE: 142 MMHG | HEIGHT: 71 IN | DIASTOLIC BLOOD PRESSURE: 107 MMHG | HEART RATE: 80 BPM | TEMPERATURE: 98.2 F | RESPIRATION RATE: 18 BRPM | WEIGHT: 195 LBS | BODY MASS INDEX: 27.3 KG/M2 | OXYGEN SATURATION: 93 %

## 2023-09-30 DIAGNOSIS — K90.829 SHORT BOWEL SYNDROME, UNSPECIFIED WHETHER COLON IN CONTINUITY: ICD-10-CM

## 2023-09-30 DIAGNOSIS — K56.1 JEJUNAL INTUSSUSCEPTION (H): Primary | ICD-10-CM

## 2023-09-30 DIAGNOSIS — Z93.1 GASTROSTOMY STATUS (H): ICD-10-CM

## 2023-09-30 DIAGNOSIS — Z98.890 S/P EXPLORATORY LAPAROTOMY: ICD-10-CM

## 2023-09-30 LAB
ABO/RH(D): NORMAL
ANION GAP SERPL CALCULATED.3IONS-SCNC: 12 MMOL/L (ref 7–15)
ANTIBODY SCREEN: NEGATIVE
BLD PROD TYP BPU: NORMAL
BLD PROD TYP BPU: NORMAL
BLOOD COMPONENT TYPE: NORMAL
BLOOD COMPONENT TYPE: NORMAL
BUN SERPL-MCNC: 22.1 MG/DL (ref 6–20)
CALCIUM SERPL-MCNC: 8.4 MG/DL (ref 8.6–10)
CHLORIDE SERPL-SCNC: 103 MMOL/L (ref 98–107)
CODING SYSTEM: NORMAL
CODING SYSTEM: NORMAL
CREAT SERPL-MCNC: 0.69 MG/DL (ref 0.67–1.17)
CREAT SERPL-MCNC: 0.74 MG/DL (ref 0.67–1.17)
CROSSMATCH: NORMAL
CROSSMATCH: NORMAL
DEPRECATED HCO3 PLAS-SCNC: 24 MMOL/L (ref 22–29)
EGFRCR SERPLBLD CKD-EPI 2021: >90 ML/MIN/1.73M2
EGFRCR SERPLBLD CKD-EPI 2021: >90 ML/MIN/1.73M2
GLUCOSE SERPL-MCNC: 169 MG/DL (ref 70–99)
MAGNESIUM SERPL-MCNC: 1.8 MG/DL (ref 1.7–2.3)
PHOSPHATE SERPL-MCNC: 3.4 MG/DL (ref 2.5–4.5)
POTASSIUM SERPL-SCNC: 3.9 MMOL/L (ref 3.4–5.3)
SODIUM SERPL-SCNC: 139 MMOL/L (ref 135–145)
SPECIMEN EXPIRATION DATE: NORMAL
UNIT ABO/RH: NORMAL
UNIT ABO/RH: NORMAL
UNIT NUMBER: NORMAL
UNIT NUMBER: NORMAL
UNIT STATUS: NORMAL
UNIT STATUS: NORMAL
UNIT TYPE ISBT: 6200
UNIT TYPE ISBT: 6200

## 2023-09-30 PROCEDURE — 36415 COLL VENOUS BLD VENIPUNCTURE: CPT

## 2023-09-30 PROCEDURE — 370N000017 HC ANESTHESIA TECHNICAL FEE, PER MIN: Performed by: SURGERY

## 2023-09-30 PROCEDURE — 250N000011 HC RX IP 250 OP 636: Mod: JZ

## 2023-09-30 PROCEDURE — 0DBA0ZZ EXCISION OF JEJUNUM, OPEN APPROACH: ICD-10-PCS | Performed by: SURGERY

## 2023-09-30 PROCEDURE — 360N000076 HC SURGERY LEVEL 3, PER MIN: Performed by: SURGERY

## 2023-09-30 PROCEDURE — 258N000003 HC RX IP 258 OP 636

## 2023-09-30 PROCEDURE — 86923 COMPATIBILITY TEST ELECTRIC: CPT

## 2023-09-30 PROCEDURE — 88307 TISSUE EXAM BY PATHOLOGIST: CPT | Mod: TC | Performed by: SURGERY

## 2023-09-30 PROCEDURE — 250N000011 HC RX IP 250 OP 636: Performed by: SURGERY

## 2023-09-30 PROCEDURE — 250N000013 HC RX MED GY IP 250 OP 250 PS 637

## 2023-09-30 PROCEDURE — 44120 REMOVAL OF SMALL INTESTINE: CPT | Mod: GC | Performed by: SURGERY

## 2023-09-30 PROCEDURE — 710N000010 HC RECOVERY PHASE 1, LEVEL 2, PER MIN: Performed by: SURGERY

## 2023-09-30 PROCEDURE — 250N000013 HC RX MED GY IP 250 OP 250 PS 637: Performed by: STUDENT IN AN ORGANIZED HEALTH CARE EDUCATION/TRAINING PROGRAM

## 2023-09-30 PROCEDURE — 250N000011 HC RX IP 250 OP 636

## 2023-09-30 PROCEDURE — 250N000025 HC SEVOFLURANE, PER MIN: Performed by: SURGERY

## 2023-09-30 PROCEDURE — 82565 ASSAY OF CREATININE: CPT | Performed by: STUDENT IN AN ORGANIZED HEALTH CARE EDUCATION/TRAINING PROGRAM

## 2023-09-30 PROCEDURE — 120N000002 HC R&B MED SURG/OB UMMC

## 2023-09-30 PROCEDURE — 250N000011 HC RX IP 250 OP 636: Mod: JZ | Performed by: EMERGENCY MEDICINE

## 2023-09-30 PROCEDURE — 250N000013 HC RX MED GY IP 250 OP 250 PS 637: Performed by: SURGERY

## 2023-09-30 PROCEDURE — 99285 EMERGENCY DEPT VISIT HI MDM: CPT | Performed by: EMERGENCY MEDICINE

## 2023-09-30 PROCEDURE — 0DSA0ZZ REPOSITION JEJUNUM, OPEN APPROACH: ICD-10-PCS | Performed by: SURGERY

## 2023-09-30 PROCEDURE — 36415 COLL VENOUS BLD VENIPUNCTURE: CPT | Performed by: SURGERY

## 2023-09-30 PROCEDURE — 258N000003 HC RX IP 258 OP 636: Performed by: STUDENT IN AN ORGANIZED HEALTH CARE EDUCATION/TRAINING PROGRAM

## 2023-09-30 PROCEDURE — 86901 BLOOD TYPING SEROLOGIC RH(D): CPT | Performed by: SURGERY

## 2023-09-30 PROCEDURE — 80048 BASIC METABOLIC PNL TOTAL CA: CPT | Performed by: STUDENT IN AN ORGANIZED HEALTH CARE EDUCATION/TRAINING PROGRAM

## 2023-09-30 PROCEDURE — 250N000009 HC RX 250

## 2023-09-30 PROCEDURE — 250N000011 HC RX IP 250 OP 636: Mod: JZ | Performed by: STUDENT IN AN ORGANIZED HEALTH CARE EDUCATION/TRAINING PROGRAM

## 2023-09-30 PROCEDURE — 96375 TX/PRO/DX INJ NEW DRUG ADDON: CPT | Performed by: EMERGENCY MEDICINE

## 2023-09-30 PROCEDURE — 272N000001 HC OR GENERAL SUPPLY STERILE: Performed by: SURGERY

## 2023-09-30 PROCEDURE — 86850 RBC ANTIBODY SCREEN: CPT | Performed by: SURGERY

## 2023-09-30 PROCEDURE — 96374 THER/PROPH/DIAG INJ IV PUSH: CPT | Performed by: EMERGENCY MEDICINE

## 2023-09-30 PROCEDURE — 44050 REDUCE BOWEL OBSTRUCTION: CPT | Mod: GC | Performed by: SURGERY

## 2023-09-30 PROCEDURE — 0D988ZZ DRAINAGE OF SMALL INTESTINE, VIA NATURAL OR ARTIFICIAL OPENING ENDOSCOPIC: ICD-10-PCS | Performed by: SURGERY

## 2023-09-30 PROCEDURE — C9113 INJ PANTOPRAZOLE SODIUM, VIA: HCPCS | Mod: JZ | Performed by: STUDENT IN AN ORGANIZED HEALTH CARE EDUCATION/TRAINING PROGRAM

## 2023-09-30 PROCEDURE — 36415 COLL VENOUS BLD VENIPUNCTURE: CPT | Performed by: STUDENT IN AN ORGANIZED HEALTH CARE EDUCATION/TRAINING PROGRAM

## 2023-09-30 PROCEDURE — 99285 EMERGENCY DEPT VISIT HI MDM: CPT | Mod: 25 | Performed by: EMERGENCY MEDICINE

## 2023-09-30 PROCEDURE — 83735 ASSAY OF MAGNESIUM: CPT | Performed by: STUDENT IN AN ORGANIZED HEALTH CARE EDUCATION/TRAINING PROGRAM

## 2023-09-30 PROCEDURE — 250N000011 HC RX IP 250 OP 636: Mod: JZ | Performed by: NURSE ANESTHETIST, CERTIFIED REGISTERED

## 2023-09-30 PROCEDURE — 999N000141 HC STATISTIC PRE-PROCEDURE NURSING ASSESSMENT: Performed by: SURGERY

## 2023-09-30 PROCEDURE — 250N000011 HC RX IP 250 OP 636: Performed by: STUDENT IN AN ORGANIZED HEALTH CARE EDUCATION/TRAINING PROGRAM

## 2023-09-30 PROCEDURE — 84100 ASSAY OF PHOSPHORUS: CPT | Performed by: STUDENT IN AN ORGANIZED HEALTH CARE EDUCATION/TRAINING PROGRAM

## 2023-09-30 PROCEDURE — 250N000009 HC RX 250: Performed by: SURGERY

## 2023-09-30 RX ORDER — CARVEDILOL 12.5 MG/1
12.5 TABLET ORAL 2 TIMES DAILY WITH MEALS
Status: DISCONTINUED | OUTPATIENT
Start: 2023-09-30 | End: 2023-10-03 | Stop reason: HOSPADM

## 2023-09-30 RX ORDER — ENOXAPARIN SODIUM 100 MG/ML
40 INJECTION SUBCUTANEOUS EVERY 24 HOURS
Status: DISCONTINUED | OUTPATIENT
Start: 2023-10-01 | End: 2023-10-03

## 2023-09-30 RX ORDER — HYDROMORPHONE HCL IN WATER/PF 6 MG/30 ML
0.2 PATIENT CONTROLLED ANALGESIA SYRINGE INTRAVENOUS EVERY 5 MIN PRN
Status: DISCONTINUED | OUTPATIENT
Start: 2023-09-30 | End: 2023-09-30 | Stop reason: HOSPADM

## 2023-09-30 RX ORDER — DEXTROSE MONOHYDRATE, SODIUM CHLORIDE, AND POTASSIUM CHLORIDE 50; 1.49; 4.5 G/1000ML; G/1000ML; G/1000ML
INJECTION, SOLUTION INTRAVENOUS CONTINUOUS
Status: DISCONTINUED | OUTPATIENT
Start: 2023-09-30 | End: 2023-10-01

## 2023-09-30 RX ORDER — ACETAMINOPHEN 325 MG/1
975 TABLET ORAL ONCE
Status: DISCONTINUED | OUTPATIENT
Start: 2023-09-30 | End: 2023-09-30 | Stop reason: HOSPADM

## 2023-09-30 RX ORDER — SODIUM CHLORIDE, SODIUM LACTATE, POTASSIUM CHLORIDE, CALCIUM CHLORIDE 600; 310; 30; 20 MG/100ML; MG/100ML; MG/100ML; MG/100ML
INJECTION, SOLUTION INTRAVENOUS CONTINUOUS
Status: DISCONTINUED | OUTPATIENT
Start: 2023-09-30 | End: 2023-09-30

## 2023-09-30 RX ORDER — LIDOCAINE HYDROCHLORIDE 20 MG/ML
INJECTION, SOLUTION INFILTRATION; PERINEURAL PRN
Status: DISCONTINUED | OUTPATIENT
Start: 2023-09-30 | End: 2023-09-30

## 2023-09-30 RX ORDER — CEFEPIME HYDROCHLORIDE 2 G/1
2 INJECTION, POWDER, FOR SOLUTION INTRAVENOUS EVERY 12 HOURS
Status: COMPLETED | OUTPATIENT
Start: 2023-09-30 | End: 2023-09-30

## 2023-09-30 RX ORDER — HYDROMORPHONE HCL IN WATER/PF 6 MG/30 ML
0.4 PATIENT CONTROLLED ANALGESIA SYRINGE INTRAVENOUS EVERY 5 MIN PRN
Status: DISCONTINUED | OUTPATIENT
Start: 2023-09-30 | End: 2023-09-30 | Stop reason: HOSPADM

## 2023-09-30 RX ORDER — FENTANYL CITRATE 50 UG/ML
INJECTION, SOLUTION INTRAMUSCULAR; INTRAVENOUS PRN
Status: DISCONTINUED | OUTPATIENT
Start: 2023-09-30 | End: 2023-09-30

## 2023-09-30 RX ORDER — PROCHLORPERAZINE 25 MG
25 SUPPOSITORY, RECTAL RECTAL EVERY 12 HOURS PRN
Status: DISCONTINUED | OUTPATIENT
Start: 2023-09-30 | End: 2023-09-30

## 2023-09-30 RX ORDER — OXYCODONE HCL 5 MG/5 ML
10 SOLUTION, ORAL ORAL EVERY 4 HOURS PRN
Status: DISCONTINUED | OUTPATIENT
Start: 2023-09-30 | End: 2023-10-01 | Stop reason: DRUGHIGH

## 2023-09-30 RX ORDER — ONDANSETRON 4 MG/1
4 TABLET, ORALLY DISINTEGRATING ORAL EVERY 6 HOURS PRN
Status: DISCONTINUED | OUTPATIENT
Start: 2023-09-30 | End: 2023-10-03 | Stop reason: HOSPADM

## 2023-09-30 RX ORDER — NALOXONE HYDROCHLORIDE 0.4 MG/ML
0.2 INJECTION, SOLUTION INTRAMUSCULAR; INTRAVENOUS; SUBCUTANEOUS
Status: DISCONTINUED | OUTPATIENT
Start: 2023-09-30 | End: 2023-10-03 | Stop reason: HOSPADM

## 2023-09-30 RX ORDER — FENTANYL CITRATE 50 UG/ML
50 INJECTION, SOLUTION INTRAMUSCULAR; INTRAVENOUS EVERY 5 MIN PRN
Status: DISCONTINUED | OUTPATIENT
Start: 2023-09-30 | End: 2023-09-30 | Stop reason: HOSPADM

## 2023-09-30 RX ORDER — HEPARIN SODIUM 5000 [USP'U]/.5ML
5000 INJECTION, SOLUTION INTRAVENOUS; SUBCUTANEOUS
Status: DISCONTINUED | OUTPATIENT
Start: 2023-09-30 | End: 2023-09-30 | Stop reason: HOSPADM

## 2023-09-30 RX ORDER — OXYCODONE HCL 5 MG/5 ML
15 SOLUTION, ORAL ORAL EVERY 4 HOURS PRN
Status: DISCONTINUED | OUTPATIENT
Start: 2023-09-30 | End: 2023-10-01 | Stop reason: DRUGHIGH

## 2023-09-30 RX ORDER — PROCHLORPERAZINE MALEATE 5 MG
10 TABLET ORAL EVERY 6 HOURS PRN
Status: DISCONTINUED | OUTPATIENT
Start: 2023-09-30 | End: 2023-09-30

## 2023-09-30 RX ORDER — ONDANSETRON 2 MG/ML
4 INJECTION INTRAMUSCULAR; INTRAVENOUS EVERY 6 HOURS PRN
Status: DISCONTINUED | OUTPATIENT
Start: 2023-09-30 | End: 2023-10-03 | Stop reason: HOSPADM

## 2023-09-30 RX ORDER — BUPIVACAINE HYDROCHLORIDE AND EPINEPHRINE 2.5; 5 MG/ML; UG/ML
INJECTION, SOLUTION INFILTRATION; PERINEURAL PRN
Status: DISCONTINUED | OUTPATIENT
Start: 2023-09-30 | End: 2023-09-30 | Stop reason: HOSPADM

## 2023-09-30 RX ORDER — FENTANYL CITRATE 50 UG/ML
25 INJECTION, SOLUTION INTRAMUSCULAR; INTRAVENOUS EVERY 5 MIN PRN
Status: DISCONTINUED | OUTPATIENT
Start: 2023-09-30 | End: 2023-09-30 | Stop reason: HOSPADM

## 2023-09-30 RX ORDER — HYDROMORPHONE HYDROCHLORIDE 1 MG/ML
.3-.5 INJECTION, SOLUTION INTRAMUSCULAR; INTRAVENOUS; SUBCUTANEOUS
Status: DISCONTINUED | OUTPATIENT
Start: 2023-09-30 | End: 2023-09-30

## 2023-09-30 RX ORDER — MEPERIDINE HYDROCHLORIDE 25 MG/ML
12.5 INJECTION INTRAMUSCULAR; INTRAVENOUS; SUBCUTANEOUS EVERY 5 MIN PRN
Status: DISCONTINUED | OUTPATIENT
Start: 2023-09-30 | End: 2023-09-30 | Stop reason: HOSPADM

## 2023-09-30 RX ORDER — HYDROMORPHONE HCL IN WATER/PF 6 MG/30 ML
0.2 PATIENT CONTROLLED ANALGESIA SYRINGE INTRAVENOUS
Status: DISCONTINUED | OUTPATIENT
Start: 2023-09-30 | End: 2023-10-01

## 2023-09-30 RX ORDER — HYDROMORPHONE HYDROCHLORIDE 1 MG/ML
0.5 INJECTION, SOLUTION INTRAMUSCULAR; INTRAVENOUS; SUBCUTANEOUS ONCE
Status: COMPLETED | OUTPATIENT
Start: 2023-09-30 | End: 2023-09-30

## 2023-09-30 RX ORDER — NALOXONE HYDROCHLORIDE 0.4 MG/ML
0.4 INJECTION, SOLUTION INTRAMUSCULAR; INTRAVENOUS; SUBCUTANEOUS
Status: DISCONTINUED | OUTPATIENT
Start: 2023-09-30 | End: 2023-10-03 | Stop reason: HOSPADM

## 2023-09-30 RX ORDER — ACETAMINOPHEN 325 MG/1
650 TABLET ORAL EVERY 4 HOURS PRN
Status: DISCONTINUED | OUTPATIENT
Start: 2023-10-03 | End: 2023-10-01

## 2023-09-30 RX ORDER — GABAPENTIN 100 MG/1
100 CAPSULE ORAL 3 TIMES DAILY
Status: DISCONTINUED | OUTPATIENT
Start: 2023-09-30 | End: 2023-10-03 | Stop reason: HOSPADM

## 2023-09-30 RX ORDER — SODIUM CHLORIDE, SODIUM LACTATE, POTASSIUM CHLORIDE, CALCIUM CHLORIDE 600; 310; 30; 20 MG/100ML; MG/100ML; MG/100ML; MG/100ML
INJECTION, SOLUTION INTRAVENOUS CONTINUOUS PRN
Status: DISCONTINUED | OUTPATIENT
Start: 2023-09-30 | End: 2023-09-30

## 2023-09-30 RX ORDER — ACETAMINOPHEN 325 MG/1
975 TABLET ORAL
Status: DISCONTINUED | OUTPATIENT
Start: 2023-09-30 | End: 2023-09-30 | Stop reason: HOSPADM

## 2023-09-30 RX ORDER — ALBUTEROL SULFATE 90 UG/1
2 AEROSOL, METERED RESPIRATORY (INHALATION) EVERY 6 HOURS PRN
Status: DISCONTINUED | OUTPATIENT
Start: 2023-09-30 | End: 2023-10-03 | Stop reason: HOSPADM

## 2023-09-30 RX ORDER — LIDOCAINE 40 MG/G
CREAM TOPICAL
Status: DISCONTINUED | OUTPATIENT
Start: 2023-09-30 | End: 2023-09-30 | Stop reason: HOSPADM

## 2023-09-30 RX ORDER — BISACODYL 10 MG
10 SUPPOSITORY, RECTAL RECTAL DAILY PRN
Status: DISCONTINUED | OUTPATIENT
Start: 2023-09-30 | End: 2023-10-03 | Stop reason: HOSPADM

## 2023-09-30 RX ORDER — LORAZEPAM 2 MG/ML
0.5 INJECTION INTRAMUSCULAR ONCE
Status: DISCONTINUED | OUTPATIENT
Start: 2023-09-30 | End: 2023-09-30 | Stop reason: HOSPADM

## 2023-09-30 RX ORDER — HYDROMORPHONE HYDROCHLORIDE 1 MG/ML
0.5 INJECTION, SOLUTION INTRAMUSCULAR; INTRAVENOUS; SUBCUTANEOUS
Status: DISCONTINUED | OUTPATIENT
Start: 2023-09-30 | End: 2023-10-01

## 2023-09-30 RX ORDER — CEFEPIME HYDROCHLORIDE 1 G/1
1 INJECTION, POWDER, FOR SOLUTION INTRAMUSCULAR; INTRAVENOUS EVERY 8 HOURS
Status: DISCONTINUED | OUTPATIENT
Start: 2023-09-30 | End: 2023-09-30

## 2023-09-30 RX ORDER — LORAZEPAM 2 MG/ML
.5-1 INJECTION INTRAMUSCULAR
Status: DISCONTINUED | OUTPATIENT
Start: 2023-09-30 | End: 2023-09-30 | Stop reason: HOSPADM

## 2023-09-30 RX ORDER — ONDANSETRON 2 MG/ML
INJECTION INTRAMUSCULAR; INTRAVENOUS PRN
Status: DISCONTINUED | OUTPATIENT
Start: 2023-09-30 | End: 2023-09-30

## 2023-09-30 RX ORDER — PROPOFOL 10 MG/ML
INJECTION, EMULSION INTRAVENOUS PRN
Status: DISCONTINUED | OUTPATIENT
Start: 2023-09-30 | End: 2023-09-30

## 2023-09-30 RX ORDER — ONDANSETRON 2 MG/ML
4 INJECTION INTRAMUSCULAR; INTRAVENOUS EVERY 6 HOURS PRN
Status: DISCONTINUED | OUTPATIENT
Start: 2023-09-30 | End: 2023-09-30

## 2023-09-30 RX ORDER — ONDANSETRON 2 MG/ML
4 INJECTION INTRAMUSCULAR; INTRAVENOUS EVERY 30 MIN PRN
Status: DISCONTINUED | OUTPATIENT
Start: 2023-09-30 | End: 2023-09-30 | Stop reason: HOSPADM

## 2023-09-30 RX ORDER — ACETAMINOPHEN 325 MG/1
975 TABLET ORAL EVERY 8 HOURS
Status: DISCONTINUED | OUTPATIENT
Start: 2023-09-30 | End: 2023-09-30

## 2023-09-30 RX ORDER — ONDANSETRON 4 MG/1
4 TABLET, ORALLY DISINTEGRATING ORAL EVERY 6 HOURS PRN
Status: DISCONTINUED | OUTPATIENT
Start: 2023-09-30 | End: 2023-09-30

## 2023-09-30 RX ORDER — ALPRAZOLAM 0.5 MG
0.5 TABLET ORAL 2 TIMES DAILY PRN
Status: DISCONTINUED | OUTPATIENT
Start: 2023-09-30 | End: 2023-10-03 | Stop reason: HOSPADM

## 2023-09-30 RX ORDER — OXYCODONE HYDROCHLORIDE 10 MG/1
10 TABLET ORAL EVERY 4 HOURS PRN
Status: DISCONTINUED | OUTPATIENT
Start: 2023-09-30 | End: 2023-09-30

## 2023-09-30 RX ORDER — AMOXICILLIN 250 MG
1 CAPSULE ORAL 2 TIMES DAILY
Status: DISCONTINUED | OUTPATIENT
Start: 2023-09-30 | End: 2023-10-03 | Stop reason: HOSPADM

## 2023-09-30 RX ORDER — DEXAMETHASONE SODIUM PHOSPHATE 4 MG/ML
INJECTION, SOLUTION INTRA-ARTICULAR; INTRALESIONAL; INTRAMUSCULAR; INTRAVENOUS; SOFT TISSUE PRN
Status: DISCONTINUED | OUTPATIENT
Start: 2023-09-30 | End: 2023-09-30

## 2023-09-30 RX ORDER — CEFAZOLIN SODIUM 1 G/3ML
INJECTION, POWDER, FOR SOLUTION INTRAMUSCULAR; INTRAVENOUS PRN
Status: DISCONTINUED | OUTPATIENT
Start: 2023-09-30 | End: 2023-09-30

## 2023-09-30 RX ORDER — PROCHLORPERAZINE MALEATE 5 MG
10 TABLET ORAL EVERY 6 HOURS PRN
Status: DISCONTINUED | OUTPATIENT
Start: 2023-09-30 | End: 2023-10-03 | Stop reason: HOSPADM

## 2023-09-30 RX ORDER — METHOCARBAMOL 750 MG/1
750 TABLET, FILM COATED ORAL EVERY 6 HOURS PRN
Status: DISCONTINUED | OUTPATIENT
Start: 2023-09-30 | End: 2023-09-30

## 2023-09-30 RX ORDER — SODIUM CHLORIDE, SODIUM LACTATE, POTASSIUM CHLORIDE, CALCIUM CHLORIDE 600; 310; 30; 20 MG/100ML; MG/100ML; MG/100ML; MG/100ML
INJECTION, SOLUTION INTRAVENOUS CONTINUOUS
Status: DISCONTINUED | OUTPATIENT
Start: 2023-09-30 | End: 2023-09-30 | Stop reason: HOSPADM

## 2023-09-30 RX ORDER — METHOCARBAMOL 750 MG/1
750 TABLET, FILM COATED ORAL EVERY 6 HOURS PRN
Status: DISCONTINUED | OUTPATIENT
Start: 2023-09-30 | End: 2023-10-03 | Stop reason: HOSPADM

## 2023-09-30 RX ORDER — ACETAMINOPHEN 325 MG/10.15ML
975 LIQUID ORAL EVERY 8 HOURS
Status: COMPLETED | OUTPATIENT
Start: 2023-09-30 | End: 2023-10-03

## 2023-09-30 RX ORDER — ALBUTEROL SULFATE 0.83 MG/ML
2.5 SOLUTION RESPIRATORY (INHALATION) EVERY 4 HOURS PRN
Status: DISCONTINUED | OUTPATIENT
Start: 2023-09-30 | End: 2023-09-30 | Stop reason: HOSPADM

## 2023-09-30 RX ORDER — DEXTROAMPHETAMINE SACCHARATE, AMPHETAMINE ASPARTATE, DEXTROAMPHETAMINE SULFATE AND AMPHETAMINE SULFATE 1.25; 1.25; 1.25; 1.25 MG/1; MG/1; MG/1; MG/1
20 TABLET ORAL DAILY
Status: DISCONTINUED | OUTPATIENT
Start: 2023-09-30 | End: 2023-10-03 | Stop reason: HOSPADM

## 2023-09-30 RX ORDER — OXYCODONE HYDROCHLORIDE 5 MG/1
5 TABLET ORAL EVERY 4 HOURS PRN
Status: DISCONTINUED | OUTPATIENT
Start: 2023-09-30 | End: 2023-09-30

## 2023-09-30 RX ORDER — POLYETHYLENE GLYCOL 3350 17 G/17G
17 POWDER, FOR SOLUTION ORAL DAILY
Status: DISCONTINUED | OUTPATIENT
Start: 2023-10-01 | End: 2023-10-03 | Stop reason: HOSPADM

## 2023-09-30 RX ORDER — HALOPERIDOL 5 MG/ML
1 INJECTION INTRAMUSCULAR
Status: DISCONTINUED | OUTPATIENT
Start: 2023-09-30 | End: 2023-09-30 | Stop reason: HOSPADM

## 2023-09-30 RX ORDER — ONDANSETRON 2 MG/ML
4 INJECTION INTRAMUSCULAR; INTRAVENOUS ONCE
Status: COMPLETED | OUTPATIENT
Start: 2023-09-30 | End: 2023-09-30

## 2023-09-30 RX ORDER — METRONIDAZOLE 500 MG/100ML
500 INJECTION, SOLUTION INTRAVENOUS EVERY 12 HOURS
Status: DISCONTINUED | OUTPATIENT
Start: 2023-09-30 | End: 2023-09-30

## 2023-09-30 RX ORDER — ONDANSETRON 4 MG/1
4 TABLET, ORALLY DISINTEGRATING ORAL EVERY 30 MIN PRN
Status: DISCONTINUED | OUTPATIENT
Start: 2023-09-30 | End: 2023-09-30 | Stop reason: HOSPADM

## 2023-09-30 RX ORDER — SODIUM CHLORIDE 9 MG/ML
INJECTION, SOLUTION INTRAVENOUS CONTINUOUS
Status: DISCONTINUED | OUTPATIENT
Start: 2023-09-30 | End: 2023-09-30 | Stop reason: HOSPADM

## 2023-09-30 RX ORDER — LIDOCAINE 40 MG/G
CREAM TOPICAL
Status: DISCONTINUED | OUTPATIENT
Start: 2023-09-30 | End: 2023-10-03 | Stop reason: HOSPADM

## 2023-09-30 RX ADMIN — POTASSIUM CHLORIDE, DEXTROSE MONOHYDRATE AND SODIUM CHLORIDE: 150; 5; 450 INJECTION, SOLUTION INTRAVENOUS at 22:38

## 2023-09-30 RX ADMIN — HYDROMORPHONE HYDROCHLORIDE 0.5 MG: 1 INJECTION, SOLUTION INTRAMUSCULAR; INTRAVENOUS; SUBCUTANEOUS at 11:27

## 2023-09-30 RX ADMIN — HYDROMORPHONE HYDROCHLORIDE 0.5 MG: 1 INJECTION, SOLUTION INTRAMUSCULAR; INTRAVENOUS; SUBCUTANEOUS at 10:43

## 2023-09-30 RX ADMIN — LIDOCAINE HYDROCHLORIDE 100 MG: 20 INJECTION, SOLUTION INFILTRATION; PERINEURAL at 08:50

## 2023-09-30 RX ADMIN — PHENYLEPHRINE HYDROCHLORIDE 100 MCG: 10 INJECTION INTRAVENOUS at 10:55

## 2023-09-30 RX ADMIN — FENTANYL CITRATE 50 MCG: 50 INJECTION, SOLUTION INTRAMUSCULAR; INTRAVENOUS at 10:52

## 2023-09-30 RX ADMIN — POTASSIUM CHLORIDE, DEXTROSE MONOHYDRATE AND SODIUM CHLORIDE: 150; 5; 450 INJECTION, SOLUTION INTRAVENOUS at 12:36

## 2023-09-30 RX ADMIN — PHENYLEPHRINE HYDROCHLORIDE 100 MCG: 10 INJECTION INTRAVENOUS at 09:22

## 2023-09-30 RX ADMIN — ONDANSETRON 4 MG: 2 INJECTION INTRAMUSCULAR; INTRAVENOUS at 21:54

## 2023-09-30 RX ADMIN — PHENYLEPHRINE HYDROCHLORIDE 100 MCG: 10 INJECTION INTRAVENOUS at 09:09

## 2023-09-30 RX ADMIN — HYDROMORPHONE HYDROCHLORIDE 0.5 MG: 1 INJECTION, SOLUTION INTRAMUSCULAR; INTRAVENOUS; SUBCUTANEOUS at 03:23

## 2023-09-30 RX ADMIN — METRONIDAZOLE 500 MG: 5 INJECTION, SOLUTION INTRAVENOUS at 05:11

## 2023-09-30 RX ADMIN — Medication 10 MG: at 09:57

## 2023-09-30 RX ADMIN — Medication 20 MG: at 09:23

## 2023-09-30 RX ADMIN — FENTANYL CITRATE 50 MCG: 50 INJECTION, SOLUTION INTRAMUSCULAR; INTRAVENOUS at 10:51

## 2023-09-30 RX ADMIN — FENTANYL CITRATE 100 MCG: 50 INJECTION, SOLUTION INTRAMUSCULAR; INTRAVENOUS at 08:50

## 2023-09-30 RX ADMIN — TOPICAL ANESTHETIC 1 ML: 200 SPRAY DENTAL; PERIODONTAL at 21:54

## 2023-09-30 RX ADMIN — PROPOFOL 100 MG: 10 INJECTION, EMULSION INTRAVENOUS at 10:51

## 2023-09-30 RX ADMIN — Medication 20 MG: at 10:20

## 2023-09-30 RX ADMIN — ONDANSETRON 4 MG: 2 INJECTION INTRAMUSCULAR; INTRAVENOUS at 03:23

## 2023-09-30 RX ADMIN — SODIUM CHLORIDE, POTASSIUM CHLORIDE, SODIUM LACTATE AND CALCIUM CHLORIDE: 600; 310; 30; 20 INJECTION, SOLUTION INTRAVENOUS at 06:17

## 2023-09-30 RX ADMIN — Medication 50 MG: at 08:50

## 2023-09-30 RX ADMIN — SUGAMMADEX 200 MG: 100 INJECTION, SOLUTION INTRAVENOUS at 11:26

## 2023-09-30 RX ADMIN — HYDROMORPHONE HYDROCHLORIDE 0.5 MG: 1 INJECTION, SOLUTION INTRAMUSCULAR; INTRAVENOUS; SUBCUTANEOUS at 07:37

## 2023-09-30 RX ADMIN — Medication 10 MG: at 10:52

## 2023-09-30 RX ADMIN — PANTOPRAZOLE SODIUM 40 MG: 40 INJECTION, POWDER, FOR SOLUTION INTRAVENOUS at 19:32

## 2023-09-30 RX ADMIN — HYDROMORPHONE HYDROCHLORIDE 0.5 MG: 1 INJECTION, SOLUTION INTRAMUSCULAR; INTRAVENOUS; SUBCUTANEOUS at 05:08

## 2023-09-30 RX ADMIN — PROCHLORPERAZINE EDISYLATE 10 MG: 5 INJECTION INTRAMUSCULAR; INTRAVENOUS at 16:35

## 2023-09-30 RX ADMIN — CEFAZOLIN 2 G: 1 INJECTION, POWDER, FOR SOLUTION INTRAMUSCULAR; INTRAVENOUS at 09:24

## 2023-09-30 RX ADMIN — ONDANSETRON 4 MG: 2 INJECTION INTRAMUSCULAR; INTRAVENOUS at 14:57

## 2023-09-30 RX ADMIN — OXYCODONE HYDROCHLORIDE 10 MG: 5 SOLUTION ORAL at 19:54

## 2023-09-30 RX ADMIN — SODIUM CHLORIDE, POTASSIUM CHLORIDE, SODIUM LACTATE AND CALCIUM CHLORIDE: 600; 310; 30; 20 INJECTION, SOLUTION INTRAVENOUS at 08:37

## 2023-09-30 RX ADMIN — SODIUM CHLORIDE, POTASSIUM CHLORIDE, SODIUM LACTATE AND CALCIUM CHLORIDE: 600; 310; 30; 20 INJECTION, SOLUTION INTRAVENOUS at 10:00

## 2023-09-30 RX ADMIN — ONDANSETRON 4 MG: 2 INJECTION INTRAMUSCULAR; INTRAVENOUS at 11:32

## 2023-09-30 RX ADMIN — GABAPENTIN 100 MG: 100 CAPSULE ORAL at 14:56

## 2023-09-30 RX ADMIN — DEXAMETHASONE SODIUM PHOSPHATE 10 MG: 4 INJECTION, SOLUTION INTRA-ARTICULAR; INTRALESIONAL; INTRAMUSCULAR; INTRAVENOUS; SOFT TISSUE at 08:50

## 2023-09-30 RX ADMIN — OXYCODONE HYDROCHLORIDE 5 MG: 5 TABLET ORAL at 04:26

## 2023-09-30 RX ADMIN — CEFEPIME HYDROCHLORIDE 2 G: 2 INJECTION, POWDER, FOR SOLUTION INTRAVENOUS at 16:35

## 2023-09-30 RX ADMIN — CEFEPIME HYDROCHLORIDE 1 G: 1 INJECTION, POWDER, FOR SOLUTION INTRAMUSCULAR; INTRAVENOUS at 04:25

## 2023-09-30 RX ADMIN — OXYCODONE HYDROCHLORIDE 15 MG: 5 SOLUTION ORAL at 15:40

## 2023-09-30 RX ADMIN — DEXMEDETOMIDINE HYDROCHLORIDE 40 MCG: 100 INJECTION, SOLUTION INTRAVENOUS at 11:09

## 2023-09-30 RX ADMIN — PROPOFOL 200 MG: 10 INJECTION, EMULSION INTRAVENOUS at 08:50

## 2023-09-30 ASSESSMENT — ACTIVITIES OF DAILY LIVING (ADL)
ADLS_ACUITY_SCORE: 35
ADLS_ACUITY_SCORE: 37
ADLS_ACUITY_SCORE: 37
ADLS_ACUITY_SCORE: 35
ADLS_ACUITY_SCORE: 37
ADLS_ACUITY_SCORE: 39
ADLS_ACUITY_SCORE: 37
ADLS_ACUITY_SCORE: 37

## 2023-09-30 NOTE — ED TRIAGE NOTES
Patient presents via EMS from [facility] with reported Intussusception of the bowels, has been having nausea and vomiting for past two days with some reported coffee ground emesis      Triage Assessment       Row Name 09/30/23 0233       Triage Assessment (Adult)    Airway WDL WDL       Respiratory WDL    Respiratory WDL WDL       Skin Circulation/Temperature WDL    Skin Circulation/Temperature WDL WDL       Cardiac WDL    Cardiac WDL WDL       Peripheral/Neurovascular WDL    Peripheral Neurovascular WDL WDL       Cognitive/Neuro/Behavioral WDL    Cognitive/Neuro/Behavioral WDL WDL

## 2023-09-30 NOTE — ANESTHESIA PREPROCEDURE EVALUATION
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    Complication of anesthesia     Difficulty swallowing     Gastric ulcer, unspecified as acute or chronic, without mention of hemorrhage, perforation, or obstruction     Gastro-oesophageal reflux disease     Head injury     Hiatal hernia     Other bladder disorder     Other chronic pain     PONV (postoperative nausea and vomiting)     Severe malnutrition (H)     TPN    Short gut syndrome     Tobacco abuse       Past Surgical History:   Procedure Laterality Date    AMPUTATION      APPENDECTOMY      BACK SURGERY  11/3/2014    curve in the spine    BIOPSY LYMPH NODE CERVICAL N/A 2015    Procedure: BIOPSY LYMPH NODE CERVICAL;  Surgeon: Baron Scanlon MD;  [facility]    CHOLECYSTECTOMY      COLONOSCOPY N/A 2021    Procedure: COLONOSCOPY;  Surgeon: Jimbo Estrada MD;  Location: [facility] OR    COLONOSCOPY N/A 2022    Procedure: COLONOSCOPY;  Surgeon: Jimbo Estrada MD;  Location: [facility] OR    COLONOSCOPY N/A 2023    Procedure: Colonoscopy;  Surgeon: Jimbo Estrada MD;  Location: [facility] OR    DISCECTOMY, FUSION CERVICAL ANTERIOR ONE LEVEL, COMBINED N/A 2/15/2017    Procedure: COMBINED DISCECTOMY, FUSION CERVICAL ANTERIOR ONE LEVEL;  Surgeon: Darren Campos MD;  [facility]    ENDOSCOPIC INSERTION TUBE GASTROSTOMY  2013    Procedure: ENDOSCOPIC INSERTION TUBE GASTROSTOMY;;  Surgeon: Francis Vyas MD;  [facility]    ENDOSCOPIC ULTRASOUND UPPER GASTROINTESTINAL TRACT (GI)  2011     Procedure:ENDOSCOPIC ULTRASOUND UPPER GASTROINTESTINAL TRACT (GI); Both Procedures done Conjointly; Surgeon:NEREIDA HOUSER; [facility]    ENDOSCOPIC ULTRASOUND UPPER GASTROINTESTINAL TRACT (GI)  9/9/2013    Procedure: ENDOSCOPIC ULTRASOUND UPPER GASTROINTESTINAL TRACT (GI);  Endoscopic Ultrasound Guide Gastrostomy Tube Placement  C-arm;  Surgeon: Noe Lizarraga MD;  [facility]    ENDOSCOPIC ULTRASOUND UPPER GASTROINTESTINAL TRACT (GI) N/A 2/24/2021    Procedure: ENDOSCOPIC ULTRASOUND, ESOPHAGOSCOPY / UPPER GASTROINTESTINAL TRACT (GI), esophagastrogastroduodenoscopy;  Surgeon: Berny Bach MD;  [facility]    ENDOSCOPY  03/25/11    EGD, [facility]    ENDOSCOPY  08/04/09    Upper Endoscopy, [facility]    ENDOSCOPY  01/05/09    Upper Endoscopy, [facility]    ESOPHAGOSCOPY, GASTROSCOPY, DUODENOSCOPY (EGD), COMBINED  4/20/2011    Procedure:COMBINED ESOPHAGOSCOPY, GASTROSCOPY, DUODENOSCOPY (EGD); Surgeon:BLU VYAS; [facility]    ESOPHAGOSCOPY, GASTROSCOPY, DUODENOSCOPY (EGD), COMBINED  6/15/2011    Procedure:COMBINED ESOPHAGOSCOPY, GASTROSCOPY, DUODENOSCOPY (EGD); Surgeon:BLU VYAS; [facility]    ESOPHAGOSCOPY, GASTROSCOPY, DUODENOSCOPY (EGD), COMBINED  6/12/2013    Procedure: COMBINED ESOPHAGOSCOPY, GASTROSCOPY, DUODENOSCOPY (EGD);;  Surgeon: Blu Vyas MD;  [facility]    ESOPHAGOSCOPY, GASTROSCOPY, DUODENOSCOPY (EGD), COMBINED  11/22/2013    Procedure: COMBINED ESOPHAGOSCOPY, GASTROSCOPY, DUODENOSCOPY (EGD);;  Surgeon: Blu Vyas MD;  [facility]    ESOPHAGOSCOPY, GASTROSCOPY, DUODENOSCOPY (EGD), COMBINED  4/30/2014    Procedure: COMBINED ESOPHAGOSCOPY, GASTROSCOPY, DUODENOSCOPY (EGD);  Surgeon: Blu Vyas MD;  [facility]    ESOPHAGOSCOPY, GASTROSCOPY, DUODENOSCOPY (EGD), COMBINED N/A 2/20/2015    Procedure: COMBINED ESOPHAGOSCOPY, GASTROSCOPY, DUODENOSCOPY (EGD), BIOPSY SINGLE OR MULTIPLE;   Surgeon: Baron Scanlon MD;  [facility]    ESOPHAGOSCOPY, GASTROSCOPY, DUODENOSCOPY (EGD), COMBINED N/A 9/30/2015    Procedure: COMBINED ESOPHAGOSCOPY, GASTROSCOPY, DUODENOSCOPY (EGD);  Surgeon: Francis Vyas MD;  Location:  GI    ESOPHAGOSCOPY, GASTROSCOPY, DUODENOSCOPY (EGD), COMBINED N/A 10/3/2019    Procedure: Upper Endoscopy;  Surgeon: Clif Morrow MD;  [facility]    GASTRECTOMY  6/22/2012    Procedure: GASTRECTOMY;  Open Approach, Excise Ulcers,Partial Gastrectomy, Esophagojejunostomy, Hiatal Hernia Repair, Extensive Lysis of Adhesions and Esaphagogastrodudenoscopy.;  Surgeon: Francis Vyas MD;  [facility]    GASTROJEJUNOSTOMY  08/26/09    Extensice enterolysis, partial resect. jejunum, part. resect gastric pouch, gastrojejunostomy anastomosis    HC ESOPH/GAS REFLUX TEST W NASAL IMPED ELECTRODE  8/5/2013    Procedure: ESOPHAGEAL IMPEDENCE FUNCTION TEST 1 HOUR OR LESS;  Surgeon: Halie Lang MD;  Location:  GI    HEAD & NECK SURGERY  2/15/2017    C5-C6    HERNIA REPAIR  2006    Umbilical hernia    HERNIORRHAPHY HIATAL  6/22/2012    Procedure: HERNIORRHAPHY HIATAL;;  Surgeon: Francis Vyas MD;  [facility]    HERNIORRHAPHY INGUINAL  11/22/2013    Procedure: HERNIORRHAPHY INGUINAL;;  Surgeon: Francis Vyas MD;  [facility]    INSERT PICC LINE Right 12/19/2019    Procedure: Picc Placement;  Surgeon: Per Dumont PA-C;  [facility]    INSERT PICC LINE Right 2/21/2020    Procedure: INSERTION, PICC;  Surgeon: Per Dumont PA-C;  [facility]    INSERT PORT VASCULAR ACCESS Right 12/19/2017    Procedure: INSERT PORT VASCULAR ACCESS;  Right Chest Port Placement ;  Surgeon: Lisandro Alejandro PA-C;  [facility]    INSERT PORT VASCULAR ACCESS Right 8/2/2018    Procedure: INSERT PORT VASCULAR ACCESS;  Place single lumen tunneled central venous access catheter;  Surgeon: Guy Jamil PA-C;  Location:   OR    IR CVC TUNNEL PLACEMENT > 5 YRS OF AGE  8/7/2019    IR CVC TUNNEL PLACEMENT > 5 YRS OF AGE  4/14/2020    IR CVC TUNNEL PLACEMENT > 5 YRS OF AGE  8/3/2020    IR CVC TUNNEL PLACEMENT > 5 YRS OF AGE  9/4/2020    IR CVC TUNNEL PLACEMENT > 5 YRS OF AGE  2/5/2021    IR CVC TUNNEL PLACEMENT > 5 YRS OF AGE  3/23/2021    IR CVC TUNNEL PLACEMENT > 5 YRS OF AGE  1/13/2022    IR CVC TUNNEL PLACEMENT > 5 YRS OF AGE  5/12/2022    IR CVC TUNNEL PLACEMENT > 5 YRS OF AGE  7/13/2022    IR CVC TUNNEL PLACEMENT > 5 YRS OF AGE  7/10/2023    IR CVC TUNNEL REMOVAL LEFT  6/7/2023    IR CVC TUNNEL REMOVAL RIGHT  10/1/2019    IR CVC TUNNEL REMOVAL RIGHT  7/30/2020    IR CVC TUNNEL REMOVAL RIGHT  9/2/2020    IR CVC TUNNEL REMOVAL RIGHT  2/3/2021    IR CVC TUNNEL REMOVAL RIGHT  3/19/2021    IR CVC TUNNEL REMOVAL RIGHT  1/10/2022    IR CVC TUNNEL REMOVAL RIGHT  5/9/2022    IR CVC TUNNEL REMOVAL RIGHT  7/8/2022    IR CVC TUNNEL REVISION LEFT  8/10/2022    IR CVC TUNNEL REVISION LEFT  9/19/2023    IR CVC TUNNEL REVISION RIGHT  5/7/2021    IR FOLLOW UP VISIT OUTPATIENT  8/7/2019    IR PICC EXCHANGE LEFT  6/8/2023    IR PICC PLACEMENT > 5 YRS OF AGE  3/7/2019    IR PICC PLACEMENT > 5 YRS OF AGE  12/19/2019    IR PICC PLACEMENT > 5 YRS OF AGE  2/21/2020    IR PICC PLACEMENT > 5 YRS OF AGE  6/7/2023    LAPAROTOMY EXPLORATORY  11/22/2013    Procedure: LAPAROTOMY EXPLORATORY;  Exploratory Laparotomy, Upper Endoscopy, Left Inguinal Hernia Repair;  Surgeon: Francis Vyas MD;  [facility]    ORTHOPEDIC SURGERY      PICC INSERTION Right 03/16/2017    5fr DL BioFlo PICC, 42cm (3cm external) in the R medial brachial vein w/ tip in the SVC RA junction.    PICC INSERTION Left 09/23/2017    5fr DL BioFlo PICC, 45cm (1cm external) in the L basilic vein w/ tip in the SVC RA junction.    PICC INSERTION Right 05/16/2019    5Fr - 43cm, Medial brachial vein, low SVC    PICC INSERTION Right 10/02/2019    5Fr - 43cm (2cm external), basilic vein, low  SVC    SHAYLEE EN Y BOWEL  2003    SOFT TISSUE SURGERY      THORACIC SURGERY      TONSILLECTOMY      TRANSESOPHAGEAL ECHOCARDIOGRAM INTRAOPERATIVE N/A 1/8/2019    Procedure: TRANSESOPHAGEAL ECHOCARDIOGRAM INTRAOPERATIVE;  Surgeon: GENERIC ANESTHESIA PROVIDER;  [facility]    TRANSESOPHAGEAL ECHOCARDIOGRAM INTRAOPERATIVE N/A 7/7/2023    Procedure: Transesophageal echocardiogram in the OR;  Surgeon: GENERIC ANESTHESIA PROVIDER;  [facility]    ZZC GASTRIC BYPASS,OBESE<100CM SHAYLEE-EN-Y  2002    lost 300 pounds      Allergies   Allergen Reactions    Bactrim [Sulfamethoxazole-Trimethoprim] Rash    Penicillins Anaphylaxis     Please see Antimicrobial Management Team allergy assessment note 10/10/2018. Patient reported tolerating amoxicillin.  Tolerating cefepime and ceftriaxone without reaction 6/23    Ertapenem Nausea and Vomiting    Doxycycline Rash    Vancomycin Rash     Rash after receiving vancomycin 3/28/16 (infusion reaction?). Tolerated with slower infusion and diphenhydramine premed.  Tolerated 1250mg over 90minutes 7/2023.      Social History     Tobacco Use    Smoking status: Light Smoker     Packs/day: 0.50     Years: 3.00     Pack years: 1.50     Types: Cigarettes    Smokeless tobacco: Never    Tobacco comments:     2/4/2021    smokes 3 cigarettes/day   Substance Use Topics    Alcohol use: No     Comment: quit 2002      Wt Readings from Last 1 Encounters:   09/29/23 88.5 kg (195 lb)        Anesthesia Evaluation   Pt has had prior anesthetic. Type: General and MAC.    History of anesthetic complications  - PONV.      ROS/MED HX  ENT/Pulmonary:  - neg pulmonary ROS     Neurologic:  - neg neurologic ROS     Cardiovascular:     (+)  hypertension- -   -  - -                                 Previous cardiac testing   Echo: Date: 07/05/2023 Results:  Left ventricular size, wall motion and function are normal. The ejection  fraction is 60-65%.  Right ventricular function, chamber size, wall motion, and thickness  are  normal.  IVC diameter and respiratory changes fall into an intermediate range  suggesting an RA pressure of 8 mmHg. No pericardial effusion is present.    Stress Test:  Date: Results:    ECG Reviewed:  Date: Results:    Cath:  Date: Results:   (-) murmur and wheezes   METS/Exercise Tolerance:     Hematologic:  - neg hematologic  ROS     Musculoskeletal:       GI/Hepatic: Comment: s/p gastric bypass; has short gut syndrome, TPN dependent.    (+) GERD,     hiatal hernia,              Renal/Genitourinary:  - neg Renal ROS     Endo:     (+)               Obesity,       Psychiatric/Substance Use:       Infectious Disease:     (+) Recent Fever,           Malignancy:       Other:      (+)  , H/O Chronic Pain,         Physical Exam    Airway        Mallampati: II   TM distance: > 3 FB   Neck ROM: full   Mouth opening: > 3 cm    Respiratory Devices and Support         Dental  no notable dental history         Cardiovascular          Rhythm and rate: regular and normal (-) no systolic click and no murmur    Pulmonary   pulmonary exam normal        breath sounds clear to auscultation   (-) no wheezes        OUTSIDE LABS:  CBC:   Lab Results   Component Value Date    WBC 17.9 (H) 09/29/2023    WBC 7.2 09/19/2023    HGB 12.2 (L) 09/29/2023    HGB 11.1 (L) 09/19/2023    HCT 37.6 (L) 09/29/2023    HCT 35.5 (L) 09/19/2023     09/29/2023     09/19/2023     BMP:   Lab Results   Component Value Date     09/29/2023     09/19/2023    POTASSIUM 3.9 09/29/2023    POTASSIUM 4.0 09/19/2023    CHLORIDE 102 09/29/2023    CHLORIDE 106 09/19/2023    CO2 23 09/29/2023    CO2 27 09/19/2023    BUN 22.1 (H) 09/29/2023    BUN 8.6 09/19/2023    CR 0.72 09/29/2023    CR 0.76 09/19/2023     (H) 09/29/2023    GLC 92 09/19/2023     COAGS:   Lab Results   Component Value Date    PTT 25 09/29/2023    INR 1.10 09/29/2023    FIBR 345 07/07/2022     POC:   Lab Results   Component Value Date    BGM 83 03/23/2021      HEPATIC:   Lab Results   Component Value Date    ALBUMIN 3.6 09/29/2023    PROTTOTAL 6.6 09/29/2023    ALT 13 09/29/2023    AST 15 09/29/2023    ALKPHOS 83 09/29/2023    BILITOTAL 1.0 09/29/2023     OTHER:   Lab Results   Component Value Date    LACT 0.6 (L) 08/06/2023    A1C 4.9 07/23/2013    SILAS 8.5 (L) 09/29/2023    PHOS 4.0 09/19/2023    MAG 2.2 09/19/2023    LIPASE 11 (L) 09/29/2023    AMYLASE 178 (H) 06/28/2012    TSH 4.06 07/07/2022    CRP <2.9 10/04/2022    SED 17 07/04/2023       Anesthesia Plan    ASA Status:  3       Anesthesia Type: General.     - Airway: ETT   Induction: Intravenous.   Maintenance: Balanced.   Techniques and Equipment:     - Lines/Monitors: 2nd IV     Consents            Postoperative Care       PONV prophylaxis: Ondansetron (or other 5HT-3), Dexamethasone or Solumedrol     Comments:                SAILAJA Olivera CRNA

## 2023-09-30 NOTE — ED NOTES
Dr. Delgado ordered Ativan to help calm patient and be able to insert NG.  Pt states that doesn't work - he needs to be sedated.  MD will not give anything else. Will transfer without NG. [facility] updated.  Report given.  No Ativan given to patient.   Patient updated on current status and on transfer.

## 2023-09-30 NOTE — PROGRESS NOTES
Px came from PACU around 14:20 via stretcher. Alert and oriented just a little sleepy after transferring to bed. On O2 @ 3.5 Lpm via NC. Able to make needs known. With L PIV, SL. R PIV infusing with D5 0.45 NS + 20 mEq Kcl @ 125 mL/hr. With CVC on L chest. 4 eye skin check done. C/o nausea and pain, PRN zofran IV given. Refused 3 pm meds except gabapentin. Said he will take them in the evening. Wanted his oxycodone to be in liquid form, pharmacist informed. Continue with POC.

## 2023-09-30 NOTE — ED NOTES
"Attempted to insert an NG, narrator fred put NG in patient's nose and pt flung himself over and pushed my hand away stating \"I need to be sedated for this to work!\"  MD updated.  "

## 2023-09-30 NOTE — H&P
General Surgery Consult Note  09/30/2023    Reason for consult: Jejunal Intussusception   Consult requested by: [facility] ED      ASSESSMENT: 52M with complex surgical history following Celestino-en-y bypass in 2002 who presents with 2 days of diffuse abdominal pain and nausea/vomiting coffee ground emesis. Hx of short gut syndrome. He has a new leukocytosis to 17.9, possibly febrile at OSH. CT with evidence of long-segment proximal jejunal intussusception causing SBO, no free air seen on scan. Patient is uncomfortable appearing but not peritonitic. He will need operative intervention to address this intussusception. Due to extensive surgical history, laparoscopic approach may prove too challenging, so will plan for exploratory laparotomy.    RECOMMENDATIONS:    - NPO  - Patient currently refusing NG tube placement in ED, only willing to attempt placement if sedated. If unable to sedate patient in ED, will plan for NGT placement and decompression in OR once intubated  - IV antibiotics  - mIVF  - Will add on for exploratory laparotomy in AM. Consent in chart      Discussed with chief resident, Dr. Duenas, and staff, Dr. Mercado.    Romeo Coe MD  Surgery      =======================    History of Present Illness:    Parker Acevedo is a 52 year old male with history of Celestino-En-Y gastric bypass in 2002 c/b need for reoperation and short gut syndrome, now with worsening abdominal pain. The patient states that he has chronic abdominal pain rated at a 4-5/10, but over the last 2 days the pain has worsened. Currently the pain is 7/10 but he received ketamine in transport. The pain is poorly localized and is dull in nature. He states that he has also been nauseous and vomiting coffee ground emesis for 2 days. He has been unable to keep any food down since the pain began. In addition, his G-tube output has changed from brownish liquid to darker black. He normally has one bowel movement per week, his last BM was 8 days ago  and was normal in quality. His urine is darker in color over the last 2 days but he denies any burning with urination. He believes that he has had fevers at home and at the outside hospital (100.4). He denies any chest pain, shortness of breath, or new cough. He had a colonoscopy last Tuesday and believes that some of his discomfort started after this.    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    Complication of anesthesia     Difficulty swallowing     Gastric ulcer, unspecified as acute or chronic, without mention of hemorrhage, perforation, or obstruction     Gastro-oesophageal reflux disease     Head injury     Hiatal hernia     Other bladder disorder     Other chronic pain     PONV (postoperative nausea and vomiting)     Severe malnutrition (H)     TPN    Short gut syndrome     Tobacco abuse        Past Surgical History  Past Surgical History:   Procedure Laterality Date    AMPUTATION      APPENDECTOMY      BACK SURGERY  11/3/2014    curve in the spine    BIOPSY LYMPH NODE CERVICAL N/A 2/20/2015    Procedure: BIOPSY LYMPH NODE CERVICAL;  Surgeon: Baron Scanlon MD;  [facility]    CHOLECYSTECTOMY      COLONOSCOPY N/A 7/14/2021    Procedure: COLONOSCOPY;  Surgeon: Jimbo Estrada MD;  Location: [facility] OR    COLONOSCOPY N/A 4/13/2022    Procedure: COLONOSCOPY;  Surgeon: Jimbo Estrada MD;  Location: [facility] OR    COLONOSCOPY N/A 9/19/2023    Procedure: Colonoscopy;  Surgeon: Jimbo Estrada MD;  Location: [facility] OR    DISCECTOMY, FUSION CERVICAL ANTERIOR ONE LEVEL, COMBINED N/A 2/15/2017    Procedure: COMBINED DISCECTOMY, FUSION CERVICAL ANTERIOR ONE LEVEL;  Surgeon: Darren Campos MD;   [facility]    ENDOSCOPIC INSERTION TUBE GASTROSTOMY  9/9/2013    Procedure: ENDOSCOPIC INSERTION TUBE GASTROSTOMY;;  Surgeon: Francis Vyas MD;  [facility]    ENDOSCOPIC ULTRASOUND UPPER GASTROINTESTINAL TRACT (GI)  4/29/2011    Procedure:ENDOSCOPIC ULTRASOUND UPPER GASTROINTESTINAL TRACT (GI); Both Procedures done Conjointly; Surgeon:NEREIDA HOUSER; [facility]    ENDOSCOPIC ULTRASOUND UPPER GASTROINTESTINAL TRACT (GI)  9/9/2013    Procedure: ENDOSCOPIC ULTRASOUND UPPER GASTROINTESTINAL TRACT (GI);  Endoscopic Ultrasound Guide Gastrostomy Tube Placement  C-arm;  Surgeon: Noe Lizarraga MD;  [facility]    ENDOSCOPIC ULTRASOUND UPPER GASTROINTESTINAL TRACT (GI) N/A 2/24/2021    Procedure: ENDOSCOPIC ULTRASOUND, ESOPHAGOSCOPY / UPPER GASTROINTESTINAL TRACT (GI), esophagastrogastroduodenoscopy;  Surgeon: Berny Bach MD;  [facility]    ENDOSCOPY  03/25/11    EGD, [facility]    ENDOSCOPY  08/04/09    Upper Endoscopy, [facility]    ENDOSCOPY  01/05/09    Upper Endoscopy, [facility]    ESOPHAGOSCOPY, GASTROSCOPY, DUODENOSCOPY (EGD), COMBINED  4/20/2011    Procedure:COMBINED ESOPHAGOSCOPY, GASTROSCOPY, DUODENOSCOPY (EGD); Surgeon:FRANCIS VYAS; [facility]    ESOPHAGOSCOPY, GASTROSCOPY, DUODENOSCOPY (EGD), COMBINED  6/15/2011    Procedure:COMBINED ESOPHAGOSCOPY, GASTROSCOPY, DUODENOSCOPY (EGD); Surgeon:FRANCIS VYAS; [facility]    ESOPHAGOSCOPY, GASTROSCOPY, DUODENOSCOPY (EGD), COMBINED  6/12/2013    Procedure: COMBINED ESOPHAGOSCOPY, GASTROSCOPY, DUODENOSCOPY (EGD);;  Surgeon: Francis Vyas MD;  [facility]    ESOPHAGOSCOPY, GASTROSCOPY, DUODENOSCOPY (EGD), COMBINED  11/22/2013    Procedure: COMBINED ESOPHAGOSCOPY, GASTROSCOPY, DUODENOSCOPY (EGD);;  Surgeon: Francis Vyas MD;  [facility]    ESOPHAGOSCOPY, GASTROSCOPY, DUODENOSCOPY (EGD), COMBINED  4/30/2014    Procedure: COMBINED ESOPHAGOSCOPY,  GASTROSCOPY, DUODENOSCOPY (EGD);  Surgeon: Francis Vyas MD;  [facility]    ESOPHAGOSCOPY, GASTROSCOPY, DUODENOSCOPY (EGD), COMBINED N/A 2/20/2015    Procedure: COMBINED ESOPHAGOSCOPY, GASTROSCOPY, DUODENOSCOPY (EGD), BIOPSY SINGLE OR MULTIPLE;  Surgeon: Baron Scanlon MD;  [facility]    ESOPHAGOSCOPY, GASTROSCOPY, DUODENOSCOPY (EGD), COMBINED N/A 9/30/2015    Procedure: COMBINED ESOPHAGOSCOPY, GASTROSCOPY, DUODENOSCOPY (EGD);  Surgeon: Francis Vyas MD;  Location:  GI    ESOPHAGOSCOPY, GASTROSCOPY, DUODENOSCOPY (EGD), COMBINED N/A 10/3/2019    Procedure: Upper Endoscopy;  Surgeon: Clif Morrow MD;  [facility]    GASTRECTOMY  6/22/2012    Procedure: GASTRECTOMY;  Open Approach, Excise Ulcers,Partial Gastrectomy, Esophagojejunostomy, Hiatal Hernia Repair, Extensive Lysis of Adhesions and Esaphagogastrodudenoscopy.;  Surgeon: Francis Vyas MD;  [facility]    GASTROJEJUNOSTOMY  08/26/09    Extensice enterolysis, partial resect. jejunum, part. resect gastric pouch, gastrojejunostomy anastomosis    HC ESOPH/GAS REFLUX TEST W NASAL IMPED ELECTRODE  8/5/2013    Procedure: ESOPHAGEAL IMPEDENCE FUNCTION TEST 1 HOUR OR LESS;  Surgeon: Halie Lang MD;  Location:  GI    HEAD & NECK SURGERY  2/15/2017    C5-C6    HERNIA REPAIR  2006    Umbilical hernia    HERNIORRHAPHY HIATAL  6/22/2012    Procedure: HERNIORRHAPHY HIATAL;;  Surgeon: Francis Vyas MD;  [facility]    HERNIORRHAPHY INGUINAL  11/22/2013    Procedure: HERNIORRHAPHY INGUINAL;;  Surgeon: Francis Vyas MD;  [facility]    INSERT PICC LINE Right 12/19/2019    Procedure: Picc Placement;  Surgeon: Per Dumont PA-C;  [facility]    INSERT PICC LINE Right 2/21/2020    Procedure: INSERTION, PICC;  Surgeon: Per Dumont PA-C;  [facility]    INSERT PORT VASCULAR ACCESS Right 12/19/2017    Procedure: INSERT PORT VASCULAR ACCESS;  Right Chest Port Placement ;   Surgeon: Lisandro Alejandro PA-C;  [facility]    INSERT PORT VASCULAR ACCESS Right 8/2/2018    Procedure: INSERT PORT VASCULAR ACCESS;  Place single lumen tunneled central venous access catheter;  Surgeon: Guy Jamil PA-C;  [facility]    IR CVC TUNNEL PLACEMENT > 5 YRS OF AGE  8/7/2019    IR CVC TUNNEL PLACEMENT > 5 YRS OF AGE  4/14/2020    IR CVC TUNNEL PLACEMENT > 5 YRS OF AGE  8/3/2020    IR CVC TUNNEL PLACEMENT > 5 YRS OF AGE  9/4/2020    IR CVC TUNNEL PLACEMENT > 5 YRS OF AGE  2/5/2021    IR CVC TUNNEL PLACEMENT > 5 YRS OF AGE  3/23/2021    IR CVC TUNNEL PLACEMENT > 5 YRS OF AGE  1/13/2022    IR CVC TUNNEL PLACEMENT > 5 YRS OF AGE  5/12/2022    IR CVC TUNNEL PLACEMENT > 5 YRS OF AGE  7/13/2022    IR CVC TUNNEL PLACEMENT > 5 YRS OF AGE  7/10/2023    IR CVC TUNNEL REMOVAL LEFT  6/7/2023    IR CVC TUNNEL REMOVAL RIGHT  10/1/2019    IR CVC TUNNEL REMOVAL RIGHT  7/30/2020    IR CVC TUNNEL REMOVAL RIGHT  9/2/2020    IR CVC TUNNEL REMOVAL RIGHT  2/3/2021    IR CVC TUNNEL REMOVAL RIGHT  3/19/2021    IR CVC TUNNEL REMOVAL RIGHT  1/10/2022    IR CVC TUNNEL REMOVAL RIGHT  5/9/2022    IR CVC TUNNEL REMOVAL RIGHT  7/8/2022    IR CVC TUNNEL REVISION LEFT  8/10/2022    IR CVC TUNNEL REVISION LEFT  9/19/2023    IR CVC TUNNEL REVISION RIGHT  5/7/2021    IR FOLLOW UP VISIT OUTPATIENT  8/7/2019    IR PICC EXCHANGE LEFT  6/8/2023    IR PICC PLACEMENT > 5 YRS OF AGE  3/7/2019    IR PICC PLACEMENT > 5 YRS OF AGE  12/19/2019    IR PICC PLACEMENT > 5 YRS OF AGE  2/21/2020    IR PICC PLACEMENT > 5 YRS OF AGE  6/7/2023    LAPAROTOMY EXPLORATORY  11/22/2013    Procedure: LAPAROTOMY EXPLORATORY;  Exploratory Laparotomy, Upper Endoscopy, Left Inguinal Hernia Repair;  Surgeon: Francis Vyas MD;  [facility]    ORTHOPEDIC SURGERY      PICC INSERTION Right 03/16/2017    5fr DL BioFlo PICC, 42cm (3cm external) in the R medial brachial vein w/ tip in the SVC RA junction.    PICC INSERTION Left 09/23/2017     5fr DL BioFlo PICC, 45cm (1cm external) in the L basilic vein w/ tip in the SVC RA junction.    PICC INSERTION Right 05/16/2019    5Fr - 43cm, Medial brachial vein, low SVC    PICC INSERTION Right 10/02/2019    5Fr - 43cm (2cm external), basilic vein, low SVC    SHAYLEE EN Y BOWEL  2003    SOFT TISSUE SURGERY      THORACIC SURGERY      TONSILLECTOMY      TRANSESOPHAGEAL ECHOCARDIOGRAM INTRAOPERATIVE N/A 1/8/2019    Procedure: TRANSESOPHAGEAL ECHOCARDIOGRAM INTRAOPERATIVE;  Surgeon: GENERIC ANESTHESIA PROVIDER;  [facility]    TRANSESOPHAGEAL ECHOCARDIOGRAM INTRAOPERATIVE N/A 7/7/2023    Procedure: Transesophageal echocardiogram in the OR;  Surgeon: GENERIC ANESTHESIA PROVIDER;  [facility]    ZZC GASTRIC BYPASS,OBESE<100CM SHAYLEE-EN-Y  2002    lost 300 pounds       Family History:  Family History   Problem Relation Age of Onset    Gastrointestinal Disease Mother         Crohns disease    Anxiety Disorder Mother     Thyroid Disease Mother         Grave's disease    Cancer Father         ear cancer-skin cancer/melanoma    Breast Cancer Maternal Grandmother     Macular Degeneration Maternal Grandfather     Anxiety Disorder Sister     Diabetes Maternal Uncle     Breast Cancer Other     Hypertension No family hx of     Hyperlipidemia No family hx of     Cerebrovascular Disease No family hx of     Prostate Cancer No family hx of     Depression No family hx of     Anesthesia Reaction No family hx of     Asthma No family hx of     Osteoporosis No family hx of     Genetic Disorder No family hx of     Obesity No family hx of     Mental Illness No family hx of     Substance Abuse No family hx of     Glaucoma No family hx of        Social History:  Social History     Social History Narrative    Not on file        Medications:  Current Outpatient Medications   Medication Sig Dispense Refill    albuterol (VENTOLIN HFA) 108 (90 Base) MCG/ACT inhaler Inhale 2 puffs into the lungs every 6 hours as needed 18 g 1    ALPRAZolam  "(XANAX) 0.5 MG tablet TAKE 1 TABLET (0.5 MG) BY MOUTH 2 TIMES DAILY AS NEEDED FOR SLEEP OR ANXIETY 60 tablet 0    amphetamine-dextroamphetamine (ADDERALL) 20 MG tablet TAKE ONE TABLET BY MOUTH ONCE DAILY 30 tablet 0    bisacodyl (DULCOLAX) 5 MG EC tablet Two days prior to exam take two (2) tablets at 4pm. One day prior to exam take two (2) tablets at 4pm 4 tablet 0    carvedilol (COREG) 6.25 MG tablet TAKE 2 TABLETS (12.5 MG) BY MOUTH 2 TIMES DAILY WITH MEALS 60 tablet 3    cyanocobalamin (CYANOCOBALAMIN) 1000 MCG/ML injection INJECT 1 ML INTO THE MUSCLE EVERY 30 DAYS 1 mL 3    enoxaparin ANTICOAGULANT (LOVENOX) 80 MG/0.8ML syringe INJECT THE CONTENTS OF ONE SYRINGE (80MG) UNDER THE SKIN TWO TIMES A DAY 67.2 mL 0    [facility] MANAGED PATIENT Contact [facility] for patient specific medication information at [phone] on admission and discharge from the hospital.  Phones are answered 24 hours a day 7 days a week 365 days a year.    Providers - Choose \"CONTINUE HOME MED (no script)\" at discharge if patient treatment with home infusion will continue.      fentaNYL (DURAGESIC) 25 mcg/hr 72 hr patch Place 1 patch onto the skin every 48 hours Fill 09/28/23 and start 09/30/23. 30 day supply for chronic pain. 15 patch 0    naloxone (NARCAN) 4 MG/0.1ML nasal spray Spray 1 spray (4 mg) into one nostril alternating nostrils once as needed for opioid reversal every 2-3 minutes until assistance arrives 0.2 mL 0    nystatin (MYCOSTATIN) 176583 UNIT/GM external cream APPLY TOPICALLY 2 TIMES DAILY 30 g 1    ondansetron (ZOFRAN ODT) 8 MG ODT tab DISSOLVE ONE TABLET ON TONGUE EVERY 8 HOURS AS NEEDED FOR NAUSEA 90 tablet 1    ondansetron (ZOFRAN) 4 MG tablet Take one tablet every six hours for nausea during colonoscopy bowel prepping 3 tablet 0    oxyCODONE (ROXICODONE) 5 MG/5ML solution Take 5-10 mLs (5-10 mg) by mouth every 4 hours as needed for moderate to severe pain Max of 40mg/day. Fill 09/28/23 and " "start 09/30/23. 30 day supply for chronic pain. 1200 mL 0    pantoprazole (PROTONIX) 40 MG EC tablet TAKE 1 TABLET (40 MG) BY MOUTH DAILY 30 tablet 5    polyethylene glycol (GOLYTELY) 236 g suspension Take as directed. Two days before your exam fill the first container with water. Cover and shake until mixed well. At 5:00pm drink one 8oz glass every 10-15 minutes until half (1/2) of the first container is empty. Store the remainder in the refrigerator. One day before your exam at 5:00pm drink the second half of the first container until it is gone. Before you go to bed mix the second container with water and put in refrigerator. Six hours before your check in time drink one 8oz glass every 10-15 minutes until half of container is empty. Discard the remainder of solution. 8000 mL 0    polyethylene glycol (MIRALAX) 17 GM/Dose powder Take 17 g by mouth daily 1020 g 3    sodium chloride 4.64 mL with gentamicin 12.4 mg, heparin (porcine) 50 Units for Dialysis Catheter Care gentamicin (GARAMYCIN) 2.5 mg/mL, heparin (porcine) 10 Units/mL in sodium chloride 0.9 % 5 mL Antimicrobial Catheter Lock Therapy    Dwell in lumen #1 when TPN is not running. 1 each 0    sodium chloride 4.64 mL with gentamicin 12.4 mg, heparin (porcine) 50 Units for Dialysis Catheter Care gentamicin (GARAMYCIN) 2.5 mg/mL, heparin (porcine) 10 Units/mL in sodium chloride 0.9 % 5 mL Antimicrobial Catheter Lock Therapy    Dwell in lumen #2 when TPN is not running. 1 each 0    sucralfate (CARAFATE) 1 GM/10ML suspension TAKE 10MLS  BY MOUTH FOUR TIMES A DAY AS NEEDED 420 mL 1    Syringe/Needle, Disp, (B-D ECLIPSE SYRINGE) 27G X 1/2\" 1 ML MISC 1 Device every 30 days 30 each 1    vitamin D2 (ERGOCALCIFEROL) 28856 units (1250 mcg) capsule Take 1 capsule (50,000 Units) by mouth once a week 12 capsule 3       Allergies:  Allergies   Allergen Reactions    Bactrim [Sulfamethoxazole-Trimethoprim] Rash    Penicillins Anaphylaxis     Please see Antimicrobial " Management Team allergy assessment note 10/10/2018. Patient reported tolerating amoxicillin.  Tolerating cefepime and ceftriaxone without reaction 6/23    Ertapenem Nausea and Vomiting    Doxycycline Rash    Vancomycin Rash     Rash after receiving vancomycin 3/28/16 (infusion reaction?). Tolerated with slower infusion and diphenhydramine premed.  Tolerated 1250mg over 90minutes 7/2023.       Review of Symptoms:  A 10 point review of symptoms has been conducted and is negative except for that mentioned in the above HPI.    Physical Exam:    Temp:  [98.2  F (36.8  C)-98.9  F (37.2  C)] 98.4  F (36.9  C)  Pulse:  [63-89] 69  Resp:  [18-27] 18  BP: (131-156)/() 132/82  SpO2:  [93 %-97 %] 95 %    Gen: NAD. Occasionally pacing room, fidgeting. Appears uncomfortable  HEENT: NCAT, sclera anicteric  CV: RRR  Resp: nonlabored respirations, comfortable on room air  Abd: soft, diffusely tender but not peritonitic, no voluntary or involuntary guarding, nondistended, G-tube in place with black-colored drainage. Abdomen with several scars from prior surgeries  Ext: WWP w/o edema  Neuro: no focal deficits  Skin: No rashes    Labs:  CBC RESULTS:   Recent Labs   Lab Test 09/29/23 2057   WBC 17.9*   RBC 4.42   HGB 12.2*   HCT 37.6*   MCV 85   MCH 27.6   MCHC 32.4   RDW 18.6*        Last Comprehensive Metabolic Panel:  Lab Results   Component Value Date     09/29/2023    POTASSIUM 3.9 09/29/2023    CHLORIDE 102 09/29/2023    CO2 23 09/29/2023    ANIONGAP 12 09/29/2023     (H) 09/29/2023    BUN 22.1 (H) 09/29/2023    CR 0.72 09/29/2023    GFRESTIMATED >90 09/29/2023    SILAS 8.5 (L) 09/29/2023           Imaging:  CT A/P:  IMPRESSION:   1.  Small bowel obstruction due to long segment intussusception of the proximal jejunum in the left upper quadrant. Thickened, edematous small bowel around the intussusception.

## 2023-09-30 NOTE — ANESTHESIA POSTPROCEDURE EVALUATION
Patient: Parker Acevedo    Procedure: Procedure(s):  Laparotomy exploratory, reduction of intussusception, resection of small bowel with primary anastamosis, upper endoscopy       Anesthesia Type:  General    Note:  Disposition: Admission   Postop Pain Control: Uneventful            Sign Out: Well controlled pain   PONV: No   Neuro/Psych: Uneventful            Sign Out: Acceptable/Baseline neuro status   Airway/Respiratory: Uneventful            Sign Out: Acceptable/Baseline resp. status   CV/Hemodynamics: Uneventful            Sign Out: Acceptable CV status   Other NRE: NONE   DID A NON-ROUTINE EVENT OCCUR? No           Last vitals:  Vitals Value Taken Time   /72 09/30/23 1345   Temp 37.1  C (98.7  F) 09/30/23 1345   Pulse 104 09/30/23 1349   Resp 16 09/30/23 1345   SpO2 90 % 09/30/23 1354   Vitals shown include unvalidated device data.    Electronically Signed By: Christopher J. Behrens, MD  September 30, 2023  3:59 PM

## 2023-09-30 NOTE — ED NOTES
Bed: ED19  Expected date:   Expected time:   Means of arrival:   Comments:  [facility]  TS 52M  Nausea, vomiting, diarrhea, abdominal pain  Extensive abdominal history  CT SBO and intussusception  WBC 17.9, HGB 12.2, Lipase 11, urine nitrogen 12.1  Alert and orientated   Pinpoint pupils  Ativan, dilaudid, compazine, benadryl, zofran  NG attempted  Wife at bedside    No

## 2023-09-30 NOTE — OR NURSING
Pt refuses ice to abd to relieve pain.  Awake and then back to sleep.  Oriented when wake. No C/o pain while sleeping only with coughing.

## 2023-09-30 NOTE — OR NURSING
Unable to receive Cefepime from pharmacy.  Need re order per pharmacy.  Contacted Dr Duenas and he states he will place order.

## 2023-09-30 NOTE — ED TRIAGE NOTES
Brought in via EMS after 2 days of nausea/vomiting coffee ground emesis and having black tarry diarrhea.  Has a drain in place for years to drain bile  after gastric bypass.  Had colonoscopy 7/11 at [facility]. On Lovenox 80 mg BID     Triage Assessment       Row Name 09/29/23 2000       Triage Assessment (Adult)    Airway WDL WDL       Respiratory WDL    Respiratory WDL WDL       Skin Circulation/Temperature WDL    Skin Circulation/Temperature WDL WDL       Cardiac WDL    Cardiac WDL WDL       Peripheral/Neurovascular WDL    Peripheral Neurovascular WDL WDL       Cognitive/Neuro/Behavioral WDL    Cognitive/Neuro/Behavioral WDL WDL

## 2023-09-30 NOTE — PROGRESS NOTES
Surgery Post Op Check    09/30/2023    Parker Acevedo is a 52 year old male with h/o multiple abdominal surgerys now POD#0 s/p ex lap with small bowel resection.    Pt reports pain that is similar to prior operations. Denies SOB, chest pain, or dizziness. No BM. Denies voiding. He refuses to attempt incentive spirometry with resident team. Of note this morning the ED RN taking care of this patient stated that he admitted that his wife brought him xanax.     /80 (BP Location: Right arm)   Pulse 104   Temp 98.3  F (36.8  C) (Oral)   Resp 16   SpO2 96%     Gen: somnolent but arousable.   Chest: breathing non-labored  Abdomen: soft, appropriately tender, non-distended  Incision: clean, intact, with minimal strikethrough  Extremities: warm and well perfused    A/P: concern for drug abuse in combination with post operative pain management. Evaluated patient with bedside RN and discussed concerns. Discussed concerns with charge RN and bedside RN. Will get a bedside sitter overnight, continuous pulse oximetry, and to have an evaluation with night resident. Recommend incentive spirometry with pulmonary toilet. Pain moderately well controlled. Continue plan of care per primary team. Please call with any questions.    Xu Castillo MD  General Surgery Resident  Pager [phone]

## 2023-09-30 NOTE — ED PROVIDER NOTES
History     Chief Complaint   Patient presents with    Abdominal Pain    Nausea, Vomiting, & Diarrhea     HPI  Parker Acevedo is a 52 year old male who has a past medical history significant for short gut syndrome and severe malnutrition on chronic TPN with recent Cm placement, dysphagia, anxiety, recurrent CLABSI, recent admission 7/4-7/11 for MRSE bacteremia (s/p 4 weeks daptomycin, stopped on 8/2), LUE non-purulent cellulitis and catheter-associated septic subclavian DVT (on Lovenox) who presents today via EMS with complaints of nausea vomiting increasing belly pain and vomiting coffee-ground emesis.  Patient states he is thrown up a number of times over the last couple of days.  Patient recently had a colonoscopy and states that he just has not felt right ever since he finished the prep for this 2 days ago.  He has tried Zofran at home with no help.  He tried his Compazine but threw this right back up.  Patient normally only has a bowel movement maybe once a week or once a month, his last bowel movement was 8 days ago and was unremarkable.  Patient also has a drain coming from his previous feeding tube that normally drains more yellowish liquid, has been very dark brown almost black in color in the last couple days, this is a change.  Patient states that he had a low-grade fever this morning of 100.4.  Denies a cough or shortness of breath.  Patient has a past surgical history significant for a Celestino-en-Y, cholecystectomy, gastrojejunostomy, gastrectomy and multiple hernia repairs    Allergies:  Allergies   Allergen Reactions    Bactrim [Sulfamethoxazole-Trimethoprim] Rash    Penicillins Anaphylaxis     Please see Antimicrobial Management Team allergy assessment note 10/10/2018. Patient reported tolerating amoxicillin.  Tolerating cefepime and ceftriaxone without reaction 6/23    Ertapenem Nausea and Vomiting    Doxycycline Rash    Vancomycin Rash     Rash after receiving vancomycin 3/28/16 (infusion  reaction?). Tolerated with slower infusion and diphenhydramine premed.  Tolerated 1250mg over 90minutes 7/2023.       Problem List:    Patient Active Problem List    Diagnosis Date Noted    Cellulitis of left upper extremity 07/04/2023     Priority: Medium    Acute deep vein thrombosis (DVT) of axillary vein of left upper extremity (H) 07/04/2023     Priority: Medium    Gram-negative bacteremia 07/07/2022     Priority: Medium    Bacteremia associated with intravascular line, initial encounter (H) 05/07/2022     Priority: Medium    Acute deep vein thrombosis (DVT) of axillary vein of right upper extremity (H) 02/28/2022     Priority: Medium    Fever, unknown origin 03/19/2021     Priority: Medium    Short bowel syndrome 01/30/2021     Priority: Medium    Bloodstream infection associated with central venous catheter, initial encounter 01/30/2021     Priority: Medium    Gastric outlet obstruction 10/07/2020     Priority: Medium     Added automatically from request for surgery [id number]      Gram-positive bacteremia 09/29/2019     Priority: Medium    On total parenteral nutrition 09/29/2019     Priority: Medium     Added automatically from request for surgery [id number]      PICC line infiltration, sequela 07/22/2019     Priority: Medium    Infection by Candida species 01/04/2019     Priority: Medium    Bacteremia 10/10/2018     Priority: Medium    Hyperlipidemia LDL goal <130 08/07/2018     Priority: Medium    S/P bariatric surgery 07/30/2018     Priority: Medium    Generalized weakness 01/30/2018     Priority: Medium    Catheter-related bloodstream infection (CRBSI) 09/23/2017     Priority: Medium    Low serum iron 09/08/2017     Priority: Medium    Anemia, iron deficiency 09/08/2017     Priority: Medium    Abnormal echocardiogram 04/11/2017     Priority: Medium    Port or reservoir infection, initial encounter 03/16/2017     Priority: Medium    Status post cervical spinal arthrodesis 02/15/2017     Priority: Medium     Short gut syndrome      Priority: Medium    Anxiety      Priority: Medium    Chronic nausea 05/10/2016     Priority: Medium    Fungemia 04/11/2016     Priority: Medium    Positive blood culture 03/29/2016     Priority: Medium    Insomnia 08/13/2015     Priority: Medium    Chronic pain 07/07/2015     Priority: Medium     Patient is followed by Dr. Milligan for ongoing prescription of pain medication.  All refills should be approved by this provider, or covering partner.    Medication(s): Fentanyl 50 mcg patches every three days/ Liquid oxycodone 5 mg/5ml up to 50 mg daily.   Maximum quantity per month: as per Dr. Milligan through [facility]  Clinic visit frequency required: Q 3 months at Pain Management    Controlled substance agreement on file: Yes       Date(s): Through Pain Management    Pain Clinic evaluation in the past: Yes       Date(s):  Ongoing every three months       Location(s):  Per pain management    DIRE Total Score(s):  No flowsheet data found.    Last [facility] website verification:  Through Pain Management   [email or web address]    Esteban Daly MD            Health Care Home 02/24/2015     Priority: Medium             Iron deficiency 05/23/2014     Priority: Medium    Vitamin D deficiency 05/22/2014     Priority: Medium    Former smoker 02/24/2014     Priority: Medium    Bile reflux esophagitis 10/16/2013     Priority: Medium    Constipation 10/01/2013     Priority: Medium    Chronic anxiety 09/25/2013     Priority: Medium    Dysphagia 09/17/2013     Priority: Medium    Weight loss, non-intentional 09/17/2013     Priority: Medium    Malnutrition (H) 09/17/2013     Priority: Medium    Dehydration 08/28/2013     Priority: Medium    Vitamin B12 deficiency without anemia 07/31/2013     Priority: Medium     Diagnosis updated by automated process. Provider to review and confirm.      Thiamine deficiency 07/31/2013     Priority: Medium    ADHD (attention deficit hyperactivity disorder),  inattentive type 07/02/2013     Priority: Medium     Patient is followed by RICCO SHARP for ongoing prescription of stimulants.  All refills should be approved by this provider, or covering partner.    Medication(s): Adderall.   Maximum quantity per month: 30  Clinic visit frequency required:      Controlled substance agreement on file: No  Neuropsych evaluation for ADD completed:  No    Last [facility] website verification:  done on 5/6/19  [email or web address]        Anemia 09/20/2012     Priority: Medium    Vomiting 06/28/2012     Priority: Medium    Chronic abdominal pain 06/01/2012     Priority: Medium     Patient is followed by ALEXANDRIA WHITLEY for ongoing prescription of narcotic pain medicine.  Med: liquid oxycodone up to 60 mg per day.   Maximum use per month:   Expected duration: ongoing, hope per surgery that will resolve with upcoming surgery  Narcotic agreement on file: YES  Clinic visit recommended: Q 3 months        Peptic ulcer disease 04/08/2010     Priority: Medium    Gastric bypass status for obesity 04/08/2010     Priority: Medium     Top weight of 492.          Past Medical History:    Past Medical History:   Diagnosis Date    ADHD (attention deficit hyperactivity disorder)     Anxiety     Cardiomyopathy in nutritional diseases (H)     Chronic abdominal pain     CLABSI (central line-associated bloodstream infection)     Complication of anesthesia     Difficulty swallowing     Gastric ulcer, unspecified as acute or chronic, without mention of hemorrhage, perforation, or obstruction     Gastro-oesophageal reflux disease     Head injury     Hiatal hernia     Other bladder disorder     Other chronic pain     PONV (postoperative nausea and vomiting)     Severe malnutrition (H)     Short gut syndrome     Tobacco abuse        Past Surgical History:    Past Surgical History:   Procedure Laterality Date    AMPUTATION      APPENDECTOMY      BACK SURGERY  11/3/2014    curve in the spine     BIOPSY LYMPH NODE CERVICAL N/A 2/20/2015    Procedure: BIOPSY LYMPH NODE CERVICAL;  Surgeon: Baron Scanlon MD;  [facility]    CHOLECYSTECTOMY      COLONOSCOPY N/A 7/14/2021    Procedure: COLONOSCOPY;  Surgeon: Jimbo Estrada MD;  Location: [facility] OR    COLONOSCOPY N/A 4/13/2022    Procedure: COLONOSCOPY;  Surgeon: Jimbo Estrada MD;  Location: [facility] OR    COLONOSCOPY N/A 9/19/2023    Procedure: Colonoscopy;  Surgeon: Jimbo Estrada MD;  Location: [facility] OR    DISCECTOMY, FUSION CERVICAL ANTERIOR ONE LEVEL, COMBINED N/A 2/15/2017    Procedure: COMBINED DISCECTOMY, FUSION CERVICAL ANTERIOR ONE LEVEL;  Surgeon: Darren Campos MD;  [facility]    ENDOSCOPIC INSERTION TUBE GASTROSTOMY  9/9/2013    Procedure: ENDOSCOPIC INSERTION TUBE GASTROSTOMY;;  Surgeon: Francis Vyas MD;  [facility]    ENDOSCOPIC ULTRASOUND UPPER GASTROINTESTINAL TRACT (GI)  4/29/2011    Procedure:ENDOSCOPIC ULTRASOUND UPPER GASTROINTESTINAL TRACT (GI); Both Procedures done Conjointly; Surgeon:NEREIDA HOUSER; [facility]    ENDOSCOPIC ULTRASOUND UPPER GASTROINTESTINAL TRACT (GI)  9/9/2013    Procedure: ENDOSCOPIC ULTRASOUND UPPER GASTROINTESTINAL TRACT (GI);  Endoscopic Ultrasound Guide Gastrostomy Tube Placement  C-arm;  Surgeon: Noe Lizarraga MD;  [facility]    ENDOSCOPIC ULTRASOUND UPPER GASTROINTESTINAL TRACT (GI) N/A 2/24/2021    Procedure: ENDOSCOPIC ULTRASOUND, ESOPHAGOSCOPY / UPPER GASTROINTESTINAL TRACT (GI), esophagastrogastroduodenoscopy;  Surgeon: Berny Bach MD;  [facility]    ENDOSCOPY  03/25/11    EGD, [facility]    ENDOSCOPY  08/04/09    Upper Endoscopy, [facility]    ENDOSCOPY  01/05/09    Upper Endoscopy, [facility]    ESOPHAGOSCOPY, GASTROSCOPY, DUODENOSCOPY (EGD), COMBINED  4/20/2011    Procedure:COMBINED ESOPHAGOSCOPY, GASTROSCOPY, DUODENOSCOPY (EGD); Surgeon:FRANCIS VYAS; [facility]     ESOPHAGOSCOPY, GASTROSCOPY, DUODENOSCOPY (EGD), COMBINED  6/15/2011    Procedure:COMBINED ESOPHAGOSCOPY, GASTROSCOPY, DUODENOSCOPY (EGD); Surgeon:FRANCIS VYAS; [facility]    ESOPHAGOSCOPY, GASTROSCOPY, DUODENOSCOPY (EGD), COMBINED  6/12/2013    Procedure: COMBINED ESOPHAGOSCOPY, GASTROSCOPY, DUODENOSCOPY (EGD);;  Surgeon: Francis Vyas MD;  [facility]    ESOPHAGOSCOPY, GASTROSCOPY, DUODENOSCOPY (EGD), COMBINED  11/22/2013    Procedure: COMBINED ESOPHAGOSCOPY, GASTROSCOPY, DUODENOSCOPY (EGD);;  Surgeon: Francis Vyas MD;  [facility]    ESOPHAGOSCOPY, GASTROSCOPY, DUODENOSCOPY (EGD), COMBINED  4/30/2014    Procedure: COMBINED ESOPHAGOSCOPY, GASTROSCOPY, DUODENOSCOPY (EGD);  Surgeon: Francis Vyas MD;  [facility]    ESOPHAGOSCOPY, GASTROSCOPY, DUODENOSCOPY (EGD), COMBINED N/A 2/20/2015    Procedure: COMBINED ESOPHAGOSCOPY, GASTROSCOPY, DUODENOSCOPY (EGD), BIOPSY SINGLE OR MULTIPLE;  Surgeon: Baron Scanlon MD;  Location:  OR    ESOPHAGOSCOPY, GASTROSCOPY, DUODENOSCOPY (EGD), COMBINED N/A 9/30/2015    Procedure: COMBINED ESOPHAGOSCOPY, GASTROSCOPY, DUODENOSCOPY (EGD);  Surgeon: Francis Vyas MD;  [facility]    ESOPHAGOSCOPY, GASTROSCOPY, DUODENOSCOPY (EGD), COMBINED N/A 10/3/2019    Procedure: Upper Endoscopy;  Surgeon: Clif Morrow MD;  [facility]    GASTRECTOMY  6/22/2012    Procedure: GASTRECTOMY;  Open Approach, Excise Ulcers,Partial Gastrectomy, Esophagojejunostomy, Hiatal Hernia Repair, Extensive Lysis of Adhesions and Esaphagogastrodudenoscopy.;  Surgeon: Francis Vyas MD;  [facility]    GASTROJEJUNOSTOMY  08/26/09    Extensice enterolysis, partial resect. jejunum, part. resect gastric pouch, gastrojejunostomy anastomosis    HC ESOPH/GAS REFLUX TEST W NASAL IMPED ELECTRODE  8/5/2013    Procedure: ESOPHAGEAL IMPEDENCE FUNCTION TEST 1 HOUR OR LESS;  Surgeon: Halie Lang MD;  Location:  GI    HEAD & NECK  SURGERY  2/15/2017    C5-C6    HERNIA REPAIR  2006    Umbilical hernia    HERNIORRHAPHY HIATAL  6/22/2012    Procedure: HERNIORRHAPHY HIATAL;;  Surgeon: Francis Vyas MD;  [facility]    HERNIORRHAPHY INGUINAL  11/22/2013    Procedure: HERNIORRHAPHY INGUINAL;;  Surgeon: Francis Vyas MD;  [facility]    INSERT PICC LINE Right 12/19/2019    Procedure: Picc Placement;  Surgeon: Per Dumont PA-C;  [facility]    INSERT PICC LINE Right 2/21/2020    Procedure: INSERTION, PICC;  Surgeon: Per Dumont PA-C;  [facility]    INSERT PORT VASCULAR ACCESS Right 12/19/2017    Procedure: INSERT PORT VASCULAR ACCESS;  Right Chest Port Placement ;  Surgeon: Lisandro Alejandro PA-C;  [facility]    INSERT PORT VASCULAR ACCESS Right 8/2/2018    Procedure: INSERT PORT VASCULAR ACCESS;  Place single lumen tunneled central venous access catheter;  Surgeon: Guy Jamil PA-C;  [facility]    IR CVC TUNNEL PLACEMENT > 5 YRS OF AGE  8/7/2019    IR CVC TUNNEL PLACEMENT > 5 YRS OF AGE  4/14/2020    IR CVC TUNNEL PLACEMENT > 5 YRS OF AGE  8/3/2020    IR CVC TUNNEL PLACEMENT > 5 YRS OF AGE  9/4/2020    IR CVC TUNNEL PLACEMENT > 5 YRS OF AGE  2/5/2021    IR CVC TUNNEL PLACEMENT > 5 YRS OF AGE  3/23/2021    IR CVC TUNNEL PLACEMENT > 5 YRS OF AGE  1/13/2022    IR CVC TUNNEL PLACEMENT > 5 YRS OF AGE  5/12/2022    IR CVC TUNNEL PLACEMENT > 5 YRS OF AGE  7/13/2022    IR CVC TUNNEL PLACEMENT > 5 YRS OF AGE  7/10/2023    IR CVC TUNNEL REMOVAL LEFT  6/7/2023    IR CVC TUNNEL REMOVAL RIGHT  10/1/2019    IR CVC TUNNEL REMOVAL RIGHT  7/30/2020    IR CVC TUNNEL REMOVAL RIGHT  9/2/2020    IR CVC TUNNEL REMOVAL RIGHT  2/3/2021    IR CVC TUNNEL REMOVAL RIGHT  3/19/2021    IR CVC TUNNEL REMOVAL RIGHT  1/10/2022    IR CVC TUNNEL REMOVAL RIGHT  5/9/2022    IR CVC TUNNEL REMOVAL RIGHT  7/8/2022    IR CVC TUNNEL REVISION LEFT  8/10/2022    IR CVC TUNNEL REVISION LEFT  9/19/2023    IR CVC TUNNEL  REVISION RIGHT  5/7/2021    IR FOLLOW UP VISIT OUTPATIENT  8/7/2019    IR PICC EXCHANGE LEFT  6/8/2023    IR PICC PLACEMENT > 5 YRS OF AGE  3/7/2019    IR PICC PLACEMENT > 5 YRS OF AGE  12/19/2019    IR PICC PLACEMENT > 5 YRS OF AGE  2/21/2020    IR PICC PLACEMENT > 5 YRS OF AGE  6/7/2023    LAPAROTOMY EXPLORATORY  11/22/2013    Procedure: LAPAROTOMY EXPLORATORY;  Exploratory Laparotomy, Upper Endoscopy, Left Inguinal Hernia Repair;  Surgeon: Francis Vyas MD;  [facility]    ORTHOPEDIC SURGERY      PICC INSERTION Right 03/16/2017    5fr DL BioFlo PICC, 42cm (3cm external) in the R medial brachial vein w/ tip in the SVC RA junction.    PICC INSERTION Left 09/23/2017    5fr DL BioFlo PICC, 45cm (1cm external) in the L basilic vein w/ tip in the SVC RA junction.    PICC INSERTION Right 05/16/2019    5Fr - 43cm, Medial brachial vein, low SVC    PICC INSERTION Right 10/02/2019    5Fr - 43cm (2cm external), basilic vein, low SVC    SHAYLEE EN Y BOWEL  2003    SOFT TISSUE SURGERY      THORACIC SURGERY      TONSILLECTOMY      TRANSESOPHAGEAL ECHOCARDIOGRAM INTRAOPERATIVE N/A 1/8/2019    Procedure: TRANSESOPHAGEAL ECHOCARDIOGRAM INTRAOPERATIVE;  Surgeon: GENERIC ANESTHESIA PROVIDER;  [facility]    TRANSESOPHAGEAL ECHOCARDIOGRAM INTRAOPERATIVE N/A 7/7/2023    Procedure: Transesophageal echocardiogram in the OR;  Surgeon: GENERIC ANESTHESIA PROVIDER;  [facility]    ZZC GASTRIC BYPASS,OBESE<100CM SHAYLEE-EN-Y  2002    lost 300 pounds       Family History:    Family History   Problem Relation Age of Onset    Gastrointestinal Disease Mother         Crohns disease    Anxiety Disorder Mother     Thyroid Disease Mother         Grave's disease    Cancer Father         ear cancer-skin cancer/melanoma    Breast Cancer Maternal Grandmother     Macular Degeneration Maternal Grandfather     Anxiety Disorder Sister     Diabetes Maternal Uncle     Breast Cancer Other     Hypertension No family hx of     Hyperlipidemia No  "family hx of     Cerebrovascular Disease No family hx of     Prostate Cancer No family hx of     Depression No family hx of     Anesthesia Reaction No family hx of     Asthma No family hx of     Osteoporosis No family hx of     Genetic Disorder No family hx of     Obesity No family hx of     Mental Illness No family hx of     Substance Abuse No family hx of     Glaucoma No family hx of        Social History:  Marital Status:   [2]  Social History     Tobacco Use    Smoking status: Light Smoker     Packs/day: 0.50     Years: 3.00     Pack years: 1.50     Types: Cigarettes    Smokeless tobacco: Never    Tobacco comments:     2/4/2021    smokes 3 cigarettes/day   Vaping Use    Vaping Use: Never used   Substance Use Topics    Alcohol use: No     Comment: quit 2002    Drug use: No        Medications:    albuterol (VENTOLIN HFA) 108 (90 Base) MCG/ACT inhaler  ALPRAZolam (XANAX) 0.5 MG tablet  amphetamine-dextroamphetamine (ADDERALL) 20 MG tablet  bisacodyl (DULCOLAX) 5 MG EC tablet  carvedilol (COREG) 6.25 MG tablet  cyanocobalamin (CYANOCOBALAMIN) 1000 MCG/ML injection  enoxaparin ANTICOAGULANT (LOVENOX) 80 MG/0.8ML syringe  [facility] INFUSION MANAGED PATIENT  fentaNYL (DURAGESIC) 25 mcg/hr 72 hr patch  naloxone (NARCAN) 4 MG/0.1ML nasal spray  nystatin (MYCOSTATIN) 871083 UNIT/GM external cream  ondansetron (ZOFRAN ODT) 8 MG ODT tab  ondansetron (ZOFRAN) 4 MG tablet  oxyCODONE (ROXICODONE) 5 MG/5ML solution  pantoprazole (PROTONIX) 40 MG EC tablet  polyethylene glycol (GOLYTELY) 236 g suspension  polyethylene glycol (MIRALAX) 17 GM/Dose powder  sodium chloride 4.64 mL with gentamicin 12.4 mg, heparin (porcine) 50 Units for Dialysis Catheter Care  sodium chloride 4.64 mL with gentamicin 12.4 mg, heparin (porcine) 50 Units for Dialysis Catheter Care  sucralfate (CARAFATE) 1 GM/10ML suspension  Syringe/Needle, Disp, (B-D ECLIPSE SYRINGE) 27G X 1/2\" 1 ML MISC  vitamin D2 (ERGOCALCIFEROL) 75377 units (1250 mcg) " "capsule          Review of Systems   All other systems reviewed and are negative.      Physical Exam   BP: 131/89  Pulse: 75  Temp: 98.9  F (37.2  C)  Resp: 18  Height: 180.3 cm (5' 11\")  Weight: 88.5 kg (195 lb)  SpO2: 97 %      Physical Exam  Vitals and nursing note reviewed.   Constitutional:       General: He is not in acute distress.     Appearance: He is well-developed. He is not diaphoretic.   HENT:      Head: Normocephalic and atraumatic.      Nose: Nose normal.      Mouth/Throat:      Pharynx: No oropharyngeal exudate.   Eyes:      General: No scleral icterus.     Conjunctiva/sclera: Conjunctivae normal.      Pupils: Pupils are equal, round, and reactive to light.   Cardiovascular:      Rate and Rhythm: Normal rate and regular rhythm.      Heart sounds: Normal heart sounds. No murmur heard.     No friction rub.   Pulmonary:      Effort: Pulmonary effort is normal. No respiratory distress.      Breath sounds: Normal breath sounds. No wheezing or rales.   Abdominal:      General: Bowel sounds are normal. There is no distension.      Palpations: Abdomen is soft. There is no mass.      Tenderness: There is generalized abdominal tenderness and tenderness in the right upper quadrant, epigastric area and left upper quadrant. There is no guarding or rebound.      Comments: Patient has a tube draining from a what looks like a previous feeding tube site, dark brown liquid.   Musculoskeletal:         General: No tenderness. Normal range of motion.      Cervical back: Normal range of motion.   Skin:     General: Skin is warm.      Findings: No rash.   Neurological:      Mental Status: He is alert and oriented to person, place, and time.   Psychiatric:         Judgment: Judgment normal.         ED Course                 Procedures        Results for orders placed or performed during the hospital encounter of 09/29/23 (from the past 24 hour(s))   CBC with platelets differential    Narrative    The following orders were " created for panel order CBC with platelets differential.  Procedure                               Abnormality         Status                     ---------                               -----------         ------                     CBC with platelets and d...[[id number]]  Abnormal            Final result                 Please view results for these tests on the individual orders.   Comprehensive metabolic panel   Result Value Ref Range    Sodium 137 135 - 145 mmol/L    Potassium 3.9 3.4 - 5.3 mmol/L    Carbon Dioxide (CO2) 23 22 - 29 mmol/L    Anion Gap 12 7 - 15 mmol/L    Urea Nitrogen 22.1 (H) 6.0 - 20.0 mg/dL    Creatinine 0.72 0.67 - 1.17 mg/dL    GFR Estimate >90 >60 mL/min/1.73m2    Calcium 8.5 (L) 8.6 - 10.0 mg/dL    Chloride 102 98 - 107 mmol/L    Glucose 135 (H) 70 - 99 mg/dL    Alkaline Phosphatase 83 40 - 129 U/L    AST 15 0 - 45 U/L    ALT 13 0 - 70 U/L    Protein Total 6.6 6.4 - 8.3 g/dL    Albumin 3.6 3.5 - 5.2 g/dL    Bilirubin Total 1.0 <=1.2 mg/dL   Lipase   Result Value Ref Range    Lipase 11 (L) 13 - 60 U/L   CBC with platelets and differential   Result Value Ref Range    WBC Count 17.9 (H) 4.0 - 11.0 10e3/uL    RBC Count 4.42 4.40 - 5.90 10e6/uL    Hemoglobin 12.2 (L) 13.3 - 17.7 g/dL    Hematocrit 37.6 (L) 40.0 - 53.0 %    MCV 85 78 - 100 fL    MCH 27.6 26.5 - 33.0 pg    MCHC 32.4 31.5 - 36.5 g/dL    RDW 18.6 (H) 10.0 - 15.0 %    Platelet Count 212 150 - 450 10e3/uL    % Neutrophils 88 %    % Lymphocytes 6 %    % Monocytes 6 %    % Eosinophils 0 %    % Basophils 0 %    % Immature Granulocytes 0 %    NRBCs per 100 WBC 0 <1 /100    Absolute Neutrophils 15.6 (H) 1.6 - 8.3 10e3/uL    Absolute Lymphocytes 1.1 0.8 - 5.3 10e3/uL    Absolute Monocytes 1.0 0.0 - 1.3 10e3/uL    Absolute Eosinophils 0.0 0.0 - 0.7 10e3/uL    Absolute Basophils 0.0 0.0 - 0.2 10e3/uL    Absolute Immature Granulocytes 0.1 <=0.4 10e3/uL    Absolute NRBCs 0.0 10e3/uL   Extra Tube    Narrative    The following orders were created  for panel order Extra Tube.  Procedure                               Abnormality         Status                     ---------                               -----------         ------                     Extra Green Top (Lithium...[[id number]]                      Final result                 Please view results for these tests on the individual orders.   Extra Green Top (Lithium Heparin) Tube   Result Value Ref Range    Hold Specimen extra    ABO/Rh type and screen    Narrative    The following orders were created for panel order ABO/Rh type and screen.  Procedure                               Abnormality         Status                     ---------                               -----------         ------                     Adult Type and Screen[[id number]]                            Edited Result - FINAL        Please view results for these tests on the individual orders.   Adult Type and Screen   Result Value Ref Range    ABO/RH(D) A POS     Antibody Screen Negative Negative    SPECIMEN EXPIRATION DATE [id number]    INR   Result Value Ref Range    INR 1.10 0.85 - 1.15   Partial thromboplastin time   Result Value Ref Range    aPTT 25 22 - 38 Seconds   CT Abdomen Pelvis w Contrast    Narrative    EXAM: CT ABDOMEN PELVIS W CONTRAST  LOCATION: [facility]  DATE: 9/29/2023    INDICATION: upper abd pain, vomiting, increased g tube output  COMPARISON: None.  TECHNIQUE: CT scan of the abdomen and pelvis was performed following injection of IV contrast. Multiplanar reformats were obtained. Dose reduction techniques were used.  CONTRAST: Isovue 370, 70mL    FINDINGS:   LOWER CHEST: Normal.    HEPATOBILIARY: Cholecystectomy clips, and mild biliary dilatation likely due to postcholecystectomy state. Liver parenchyma is unremarkable.    PANCREAS: Normal.    SPLEEN: Normal.    ADRENAL GLANDS: Normal.    KIDNEYS/BLADDER: Normal.    BOWEL: There is a long segment intussusception in the jejunum in  the left upper quadrant, with associated thickened edematous small bowel. The proximal jejunum and duodenum is dilated. Distal small bowel and colon are unremarkable. No pneumatosis, free   gas or free fluid. Postsurgical changes of the stomach. Percutaneous gastrostomy tube. Moderate hiatal hernia, and moderate fluid distention of the herniated stomach.    LYMPH NODES: Normal.    VASCULATURE: Unremarkable.    PELVIC ORGANS: Normal.    MUSCULOSKELETAL: Few tiny nodular soft tissue densities and foci of subcutaneous gas in the anterior abdominal wall likely injection related. No acute osseous abnormality.      Impression    IMPRESSION:   1.  Small bowel obstruction due to long segment intussusception of the proximal jejunum in the left upper quadrant. Thickened, edematous small bowel around the intussusception.       Findings discussed with Dr. Delgado 09/29/2023 11:52 PM     *Note: Due to a large number of results and/or encounters for the requested time period, some results have not been displayed. A complete set of results can be found in Results Review.       Medications   ondansetron (ZOFRAN) injection 4 mg (4 mg Intravenous $Given 9/29/23 2232)   sodium chloride 0.9% infusion (has no administration in time range)   LORazepam (ATIVAN) injection 0.5 mg (has no administration in time range)   sodium chloride 0.9% BOLUS 1,000 mL (0 mLs Intravenous Stopped 9/29/23 2132)   prochlorperazine (COMPAZINE) injection 10 mg (10 mg Intravenous $Given 9/29/23 2025)   HYDROmorphone (PF) (DILAUDID) injection 0.5 mg (0.5 mg Intravenous $Given 9/29/23 2230)   iopamidol (ISOVUE-370) solution 500 mL (95 mLs Intravenous $Given 9/29/23 2248)   Sodium Chloride 0.9 % bag 100mL for CT scan (64 mLs Intravenous $Given 9/29/23 2248)   prochlorperazine (COMPAZINE) injection 10 mg (10 mg Intravenous $Given 9/29/23 2310)   diphenhydrAMINE (BENADRYL) injection 25 mg (25 mg Intravenous $Given 9/29/23 2310)     This is a 52-year-old male with a  very complicated past medical history and past surgical history, presents with abdominal pain that is worse than his chronic pain, vomiting for the last couple of days and subjective fevers at home.  Upon arrival patient is actively vomiting.  Patient has a drainage tube from his PEG tube site that normally drains brownish liquid has been draining dark brown/black liquid.  Patient was concerned he was throwing up blood as he saw coffee grounds in his emesis.  Vitals were stable here.  Labs show a stable hemoglobin but his white count is elevated.  CT scan of the abdomen shows a small bowel obstruction secondary to a long segment intussusception in the proximal jejunum.  There is thickened edematous small bowel around the intussusception but no signs of perforation.  Because of patient's complicated past surgical history and the number of surgeries he is has, this is not someone that we can do surgery on here locally.  We will call down to the [facility] to talk to the surgery team to look at emergent transfer for for emergent surgery.  In the meantime we will place an NG tube here for comfort and decompression.  I spoke to Dr. Mercado, general surgery at the [facility] who recommended transfer down.  Unfortunately there is no beds available at the [facility] so they recommended talking to the ER.  I spoke to the ER physician there, Dr. Barba who will accept the patient in transfer to the [facility].  Patient will be transferred via ground ambulance at this time.    Addendum: Patient is refusing his NG tube at this time.  Discussed giving him some Ativan as he want to be sedated but states he wants to be completely knocked out on the OR for this.  Because this is a time sensitive surgery we will go ahead and just transfer him at this time.    Assessments & Plan (with Medical Decision Making)  Small bowel obstruction, jejunal intussusception     I have reviewed the nursing notes.    I have reviewed the  findings, diagnosis, plan and need for follow up with the patient.      New Prescriptions    No medications on file       Final diagnoses:   Jejunal intussusception (H)   Small bowel obstruction (H)       9/29/2023   [facility] EMERGENCY DEPT       Oseas Delgado MD  09/30/23 0029       Oseas Delgado MD  09/30/23 0100

## 2023-09-30 NOTE — ED PROVIDER NOTES
ED Provider Note  [facility]      History     Chief Complaint   Patient presents with    Abdominal Pain     HPI  Parker Acevedo is a 52 year old male who  has a past medical history significant for short gut syndrome and severe malnutrition on chronic TPN with recent Cm placement, dysphagia, anxiety, recurrent CLABSI, recent admission 7/4-7/11 for MRSE bacteremia (s/p 4 weeks daptomycin, stopped on 8/2), LUE non-purulent cellulitis and catheter-associated septic subclavian DVT (on Lovenox) who presents to the emergency department as a transfer from [facility] for abdominal pain, CT scan with evidence of small bowel obstruction due to long segment intussusception of the proximal jejunum in the left upper quadrant.    Per Per review patient initially presented to [facility] for evaluation of abdominal pain, nausea, vomiting.  Upon arrival to the emergency department patient describes severe abdominal pain, unable to provide any additional history due to pain at this time.  Patient received IV Zofran, Compazine, Dilaudid prior to arrival, was started on ketamine drip by EMS in route.    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    Complication of anesthesia     Difficulty swallowing     Gastric ulcer, unspecified as acute or chronic, without mention of hemorrhage, perforation, or obstruction     Gastro-oesophageal reflux disease     Head injury     Hiatal hernia     Other bladder disorder     Other chronic pain     PONV (postoperative nausea and vomiting)     Severe malnutrition (H)     TPN    Short gut syndrome     Tobacco abuse      Past Surgical History:   Procedure Laterality Date     AMPUTATION      APPENDECTOMY      BACK SURGERY  11/3/2014    curve in the spine    BIOPSY LYMPH NODE CERVICAL N/A 2/20/2015    Procedure: BIOPSY LYMPH NODE CERVICAL;  Surgeon: Baron Scanlon MD;  [facility]    CHOLECYSTECTOMY      COLONOSCOPY N/A 7/14/2021    Procedure: COLONOSCOPY;  Surgeon: Jimbo Estrada MD;  Location: [facility] OR    COLONOSCOPY N/A 4/13/2022    Procedure: COLONOSCOPY;  Surgeon: Jimbo Estrada MD;  Location: [facility] OR    COLONOSCOPY N/A 9/19/2023    Procedure: Colonoscopy;  Surgeon: Jimbo Estrada MD;  Location: [facility] OR    DISCECTOMY, FUSION CERVICAL ANTERIOR ONE LEVEL, COMBINED N/A 2/15/2017    Procedure: COMBINED DISCECTOMY, FUSION CERVICAL ANTERIOR ONE LEVEL;  Surgeon: Darren Campos MD;  [facility]    ENDOSCOPIC INSERTION TUBE GASTROSTOMY  9/9/2013    Procedure: ENDOSCOPIC INSERTION TUBE GASTROSTOMY;;  Surgeon: Francis Vyas MD;  [facility]    ENDOSCOPIC ULTRASOUND UPPER GASTROINTESTINAL TRACT (GI)  4/29/2011    Procedure:ENDOSCOPIC ULTRASOUND UPPER GASTROINTESTINAL TRACT (GI); Both Procedures done Conjointly; Surgeon:NEREIDA HOUSER; [facility]    ENDOSCOPIC ULTRASOUND UPPER GASTROINTESTINAL TRACT (GI)  9/9/2013    Procedure: ENDOSCOPIC ULTRASOUND UPPER GASTROINTESTINAL TRACT (GI);  Endoscopic Ultrasound Guide Gastrostomy Tube Placement  C-arm;  Surgeon: Noe Lizarraga MD;  [facility]    ENDOSCOPIC ULTRASOUND UPPER GASTROINTESTINAL TRACT (GI) N/A 2/24/2021    Procedure: ENDOSCOPIC ULTRASOUND, ESOPHAGOSCOPY / UPPER GASTROINTESTINAL TRACT (GI), esophagastrogastroduodenoscopy;  Surgeon: Berny Bach MD;  [facility]    ENDOSCOPY  03/25/11    EGD, [facility]    ENDOSCOPY  08/04/09    Upper Endoscopy, [facility]    ENDOSCOPY  01/05/09    Upper Endoscopy, [facility]    ESOPHAGOSCOPY, GASTROSCOPY, DUODENOSCOPY (EGD), COMBINED  4/20/2011    Procedure:COMBINED ESOPHAGOSCOPY,  GASTROSCOPY, DUODENOSCOPY (EGD); Surgeon:BLU VYAS; [facility]    ESOPHAGOSCOPY, GASTROSCOPY, DUODENOSCOPY (EGD), COMBINED  6/15/2011    Procedure:COMBINED ESOPHAGOSCOPY, GASTROSCOPY, DUODENOSCOPY (EGD); Surgeon:BLU VYAS; [facility]    ESOPHAGOSCOPY, GASTROSCOPY, DUODENOSCOPY (EGD), COMBINED  6/12/2013    Procedure: COMBINED ESOPHAGOSCOPY, GASTROSCOPY, DUODENOSCOPY (EGD);;  Surgeon: Blu Vyas MD;  [facility]    ESOPHAGOSCOPY, GASTROSCOPY, DUODENOSCOPY (EGD), COMBINED  11/22/2013    Procedure: COMBINED ESOPHAGOSCOPY, GASTROSCOPY, DUODENOSCOPY (EGD);;  Surgeon: Blu Vyas MD;  [facility]    ESOPHAGOSCOPY, GASTROSCOPY, DUODENOSCOPY (EGD), COMBINED  4/30/2014    Procedure: COMBINED ESOPHAGOSCOPY, GASTROSCOPY, DUODENOSCOPY (EGD);  Surgeon: Blu Vyas MD;  [facility]    ESOPHAGOSCOPY, GASTROSCOPY, DUODENOSCOPY (EGD), COMBINED N/A 2/20/2015    Procedure: COMBINED ESOPHAGOSCOPY, GASTROSCOPY, DUODENOSCOPY (EGD), BIOPSY SINGLE OR MULTIPLE;  Surgeon: Baron Scanlon MD;  Location:  OR    ESOPHAGOSCOPY, GASTROSCOPY, DUODENOSCOPY (EGD), COMBINED N/A 9/30/2015    Procedure: COMBINED ESOPHAGOSCOPY, GASTROSCOPY, DUODENOSCOPY (EGD);  Surgeon: Blu Vyas MD;  [facility]    ESOPHAGOSCOPY, GASTROSCOPY, DUODENOSCOPY (EGD), COMBINED N/A 10/3/2019    Procedure: Upper Endoscopy;  Surgeon: Clif Morrow MD;  [facility]    GASTRECTOMY  6/22/2012    Procedure: GASTRECTOMY;  Open Approach, Excise Ulcers,Partial Gastrectomy, Esophagojejunostomy, Hiatal Hernia Repair, Extensive Lysis of Adhesions and Esaphagogastrodudenoscopy.;  Surgeon: Blu Vyas MD;  [facility]    GASTROJEJUNOSTOMY  08/26/09    Extensice enterolysis, partial resect. jejunum, part. resect gastric pouch, gastrojejunostomy anastomosis    HC ESOPH/GAS REFLUX TEST W NASAL IMPED ELECTRODE  8/5/2013    Procedure: ESOPHAGEAL IMPEDENCE FUNCTION TEST 1 HOUR  OR LESS;  Surgeon: Halie Lang MD;  [facility]    HEAD & NECK SURGERY  2/15/2017    C5-C6    HERNIA REPAIR  2006    Umbilical hernia    HERNIORRHAPHY HIATAL  6/22/2012    Procedure: HERNIORRHAPHY HIATAL;;  Surgeon: Francis Vyas MD;  [facility]    HERNIORRHAPHY INGUINAL  11/22/2013    Procedure: HERNIORRHAPHY INGUINAL;;  Surgeon: Francis Vyas MD;  [facility]    INSERT PICC LINE Right 12/19/2019    Procedure: Picc Placement;  Surgeon: Per Dumont PA-C;  [facility]    INSERT PICC LINE Right 2/21/2020    Procedure: INSERTION, PICC;  Surgeon: Per Dumont PA-C;  [facility]    INSERT PORT VASCULAR ACCESS Right 12/19/2017    Procedure: INSERT PORT VASCULAR ACCESS;  Right Chest Port Placement ;  Surgeon: Lisandro Alejandro PA-C;  [facility]    INSERT PORT VASCULAR ACCESS Right 8/2/2018    Procedure: INSERT PORT VASCULAR ACCESS;  Place single lumen tunneled central venous access catheter;  Surgeon: Guy Jamil PA-C;  [facility]    IR CVC TUNNEL PLACEMENT > 5 YRS OF AGE  8/7/2019    IR CVC TUNNEL PLACEMENT > 5 YRS OF AGE  4/14/2020    IR CVC TUNNEL PLACEMENT > 5 YRS OF AGE  8/3/2020    IR CVC TUNNEL PLACEMENT > 5 YRS OF AGE  9/4/2020    IR CVC TUNNEL PLACEMENT > 5 YRS OF AGE  2/5/2021    IR CVC TUNNEL PLACEMENT > 5 YRS OF AGE  3/23/2021    IR CVC TUNNEL PLACEMENT > 5 YRS OF AGE  1/13/2022    IR CVC TUNNEL PLACEMENT > 5 YRS OF AGE  5/12/2022    IR CVC TUNNEL PLACEMENT > 5 YRS OF AGE  7/13/2022    IR CVC TUNNEL PLACEMENT > 5 YRS OF AGE  7/10/2023    IR CVC TUNNEL REMOVAL LEFT  6/7/2023    IR CVC TUNNEL REMOVAL RIGHT  10/1/2019    IR CVC TUNNEL REMOVAL RIGHT  7/30/2020    IR CVC TUNNEL REMOVAL RIGHT  9/2/2020    IR CVC TUNNEL REMOVAL RIGHT  2/3/2021    IR CVC TUNNEL REMOVAL RIGHT  3/19/2021    IR CVC TUNNEL REMOVAL RIGHT  1/10/2022    IR CVC TUNNEL REMOVAL RIGHT  5/9/2022    IR CVC TUNNEL REMOVAL RIGHT  7/8/2022    IR CVC TUNNEL  REVISION LEFT  8/10/2022    IR CVC TUNNEL REVISION LEFT  9/19/2023    IR CVC TUNNEL REVISION RIGHT  5/7/2021    IR FOLLOW UP VISIT OUTPATIENT  8/7/2019    IR PICC EXCHANGE LEFT  6/8/2023    IR PICC PLACEMENT > 5 YRS OF AGE  3/7/2019    IR PICC PLACEMENT > 5 YRS OF AGE  12/19/2019    IR PICC PLACEMENT > 5 YRS OF AGE  2/21/2020    IR PICC PLACEMENT > 5 YRS OF AGE  6/7/2023    LAPAROTOMY EXPLORATORY  11/22/2013    Procedure: LAPAROTOMY EXPLORATORY;  Exploratory Laparotomy, Upper Endoscopy, Left Inguinal Hernia Repair;  Surgeon: Francis Vyas MD;  [facility]    ORTHOPEDIC SURGERY      PICC INSERTION Right 03/16/2017    5fr DL BioFlo PICC, 42cm (3cm external) in the R medial brachial vein w/ tip in the SVC RA junction.    PICC INSERTION Left 09/23/2017    5fr DL BioFlo PICC, 45cm (1cm external) in the L basilic vein w/ tip in the SVC RA junction.    PICC INSERTION Right 05/16/2019    5Fr - 43cm, Medial brachial vein, low SVC    PICC INSERTION Right 10/02/2019    5Fr - 43cm (2cm external), basilic vein, low SVC    SHAYLEE EN Y BOWEL  2003    SOFT TISSUE SURGERY      THORACIC SURGERY      TONSILLECTOMY      TRANSESOPHAGEAL ECHOCARDIOGRAM INTRAOPERATIVE N/A 1/8/2019    Procedure: TRANSESOPHAGEAL ECHOCARDIOGRAM INTRAOPERATIVE;  Surgeon: GENERIC ANESTHESIA PROVIDER;  [facility]    TRANSESOPHAGEAL ECHOCARDIOGRAM INTRAOPERATIVE N/A 7/7/2023    Procedure: Transesophageal echocardiogram in the OR;  Surgeon: GENERIC ANESTHESIA PROVIDER;  [facility]    ZZC GASTRIC BYPASS,OBESE<100CM SHAYLEE-EN-Y  2002    lost 300 pounds     albuterol (VENTOLIN HFA) 108 (90 Base) MCG/ACT inhaler  ALPRAZolam (XANAX) 0.5 MG tablet  amphetamine-dextroamphetamine (ADDERALL) 20 MG tablet  bisacodyl (DULCOLAX) 5 MG EC tablet  carvedilol (COREG) 6.25 MG tablet  cyanocobalamin (CYANOCOBALAMIN) 1000 MCG/ML injection  enoxaparin ANTICOAGULANT (LOVENOX) 80 MG/0.8ML syringe  [place] [facility] PATIENT  fentaNYL (DURAGESIC) 25 mcg/hr  "72 hr patch  naloxone (NARCAN) 4 MG/0.1ML nasal spray  nystatin (MYCOSTATIN) 371504 UNIT/GM external cream  ondansetron (ZOFRAN ODT) 8 MG ODT tab  ondansetron (ZOFRAN) 4 MG tablet  oxyCODONE (ROXICODONE) 5 MG/5ML solution  pantoprazole (PROTONIX) 40 MG EC tablet  polyethylene glycol (GOLYTELY) 236 g suspension  polyethylene glycol (MIRALAX) 17 GM/Dose powder  sodium chloride 4.64 mL with gentamicin 12.4 mg, heparin (porcine) 50 Units for Dialysis Catheter Care  sodium chloride 4.64 mL with gentamicin 12.4 mg, heparin (porcine) 50 Units for Dialysis Catheter Care  sucralfate (CARAFATE) 1 GM/10ML suspension  Syringe/Needle, Disp, (B-D ECLIPSE SYRINGE) 27G X 1/2\" 1 ML MISC  vitamin D2 (ERGOCALCIFEROL) 27615 units (1250 mcg) capsule      Allergies   Allergen Reactions    Bactrim [Sulfamethoxazole-Trimethoprim] Rash    Penicillins Anaphylaxis     Please see Antimicrobial Management Team allergy assessment note 10/10/2018. Patient reported tolerating amoxicillin.  Tolerating cefepime and ceftriaxone without reaction 6/23    Ertapenem Nausea and Vomiting    Doxycycline Rash    Vancomycin Rash     Rash after receiving vancomycin 3/28/16 (infusion reaction?). Tolerated with slower infusion and diphenhydramine premed.  Tolerated 1250mg over 90minutes 7/2023.     Family History  Family History   Problem Relation Age of Onset    Gastrointestinal Disease Mother         Crohns disease    Anxiety Disorder Mother     Thyroid Disease Mother         Grave's disease    Cancer Father         ear cancer-skin cancer/melanoma    Breast Cancer Maternal Grandmother     Macular Degeneration Maternal Grandfather     Anxiety Disorder Sister     Diabetes Maternal Uncle     Breast Cancer Other     Hypertension No family hx of     Hyperlipidemia No family hx of     Cerebrovascular Disease No family hx of     Prostate Cancer No family hx of     Depression No family hx of     Anesthesia Reaction No family hx of     Asthma No family hx of     " Osteoporosis No family hx of     Genetic Disorder No family hx of     Obesity No family hx of     Mental Illness No family hx of     Substance Abuse No family hx of     Glaucoma No family hx of      Social History   Social History     Tobacco Use    Smoking status: Light Smoker     Packs/day: 0.50     Years: 3.00     Pack years: 1.50     Types: Cigarettes    Smokeless tobacco: Never    Tobacco comments:     2/4/2021    smokes 3 cigarettes/day   Vaping Use    Vaping Use: Never used   Substance Use Topics    Alcohol use: No     Comment: quit 2002    Drug use: No      Past medical history, past surgical history, medications, allergies, family history, and social history were reviewed with the patient. No additional pertinent items.      A medically appropriate review of systems was performed with pertinent positives and negatives noted in the HPI, and all other systems negative.    Physical Exam   BP: (!) 146/86  Pulse: 89  Temp: 98.4  F (36.9  C)  Resp: 18  SpO2: 95 %  Physical Exam  General: Afebrile, moderate distress secondary to pain  HEENT: Normocephalic, atraumatic, conjunctivae normal. MMM  Neck: non-tender, supple  Cardio: regular rate. regular rhythm   Resp: Normal work of breathing, no respiratory distress, lungs clear bilaterally, no wheezing, rhonchi, rales  Chest/Back: no visual signs of trauma, no CVA tenderness   Abdomen: soft, diffusely tender to palpation with no rebound, guarding, no peritoneal signs.  G-tube in place with black color drainage   Neuro: alert and fully oriented. CN II-XII grossly intact. Grossly normal strength and sensation in all extremities.   MSK: no deformities. Normal range of motion  Integumentary/Skin: no rash visualized, normal color  Psych: normal affect, normal behavior      ED Course, Procedures, & Data      Procedures    Results for orders placed or performed during the hospital encounter of 09/29/23   CT Abdomen Pelvis w Contrast     Status: None    Narrative    EXAM: CT  ABDOMEN PELVIS W CONTRAST  LOCATION[facility]  DATE: 9/29/2023    INDICATION: upper abd pain, vomiting, increased g tube output  COMPARISON: None.  TECHNIQUE: CT scan of the abdomen and pelvis was performed following injection of IV contrast. Multiplanar reformats were obtained. Dose reduction techniques were used.  CONTRAST: Isovue 370, 70mL    FINDINGS:   LOWER CHEST: Normal.    HEPATOBILIARY: Cholecystectomy clips, and mild biliary dilatation likely due to postcholecystectomy state. Liver parenchyma is unremarkable.    PANCREAS: Normal.    SPLEEN: Normal.    ADRENAL GLANDS: Normal.    KIDNEYS/BLADDER: Normal.    BOWEL: There is a long segment intussusception in the jejunum in the left upper quadrant, with associated thickened edematous small bowel. The proximal jejunum and duodenum is dilated. Distal small bowel and colon are unremarkable. No pneumatosis, free   gas or free fluid. Postsurgical changes of the stomach. Percutaneous gastrostomy tube. Moderate hiatal hernia, and moderate fluid distention of the herniated stomach.    LYMPH NODES: Normal.    VASCULATURE: Unremarkable.    PELVIC ORGANS: Normal.    MUSCULOSKELETAL: Few tiny nodular soft tissue densities and foci of subcutaneous gas in the anterior abdominal wall likely injection related. No acute osseous abnormality.      Impression    IMPRESSION:   1.  Small bowel obstruction due to long segment intussusception of the proximal jejunum in the left upper quadrant. Thickened, edematous small bowel around the intussusception.       Findings discussed with Dr. Delgado 09/29/2023 11:52 PM   Comprehensive metabolic panel     Status: Abnormal   Result Value Ref Range    Sodium 137 135 - 145 mmol/L    Potassium 3.9 3.4 - 5.3 mmol/L    Carbon Dioxide (CO2) 23 22 - 29 mmol/L    Anion Gap 12 7 - 15 mmol/L    Urea Nitrogen 22.1 (H) 6.0 - 20.0 mg/dL    Creatinine 0.72 0.67 - 1.17 mg/dL    GFR Estimate >90 >60 mL/min/1.73m2    Calcium  8.5 (L) 8.6 - 10.0 mg/dL    Chloride 102 98 - 107 mmol/L    Glucose 135 (H) 70 - 99 mg/dL    Alkaline Phosphatase 83 40 - 129 U/L    AST 15 0 - 45 U/L    ALT 13 0 - 70 U/L    Protein Total 6.6 6.4 - 8.3 g/dL    Albumin 3.6 3.5 - 5.2 g/dL    Bilirubin Total 1.0 <=1.2 mg/dL   Lipase     Status: Abnormal   Result Value Ref Range    Lipase 11 (L) 13 - 60 U/L   INR     Status: Normal   Result Value Ref Range    INR 1.10 0.85 - 1.15   Partial thromboplastin time     Status: Normal   Result Value Ref Range    aPTT 25 22 - 38 Seconds   CBC with platelets and differential     Status: Abnormal   Result Value Ref Range    WBC Count 17.9 (H) 4.0 - 11.0 10e3/uL    RBC Count 4.42 4.40 - 5.90 10e6/uL    Hemoglobin 12.2 (L) 13.3 - 17.7 g/dL    Hematocrit 37.6 (L) 40.0 - 53.0 %    MCV 85 78 - 100 fL    MCH 27.6 26.5 - 33.0 pg    MCHC 32.4 31.5 - 36.5 g/dL    RDW 18.6 (H) 10.0 - 15.0 %    Platelet Count 212 150 - 450 10e3/uL    % Neutrophils 88 %    % Lymphocytes 6 %    % Monocytes 6 %    % Eosinophils 0 %    % Basophils 0 %    % Immature Granulocytes 0 %    NRBCs per 100 WBC 0 <1 /100    Absolute Neutrophils 15.6 (H) 1.6 - 8.3 10e3/uL    Absolute Lymphocytes 1.1 0.8 - 5.3 10e3/uL    Absolute Monocytes 1.0 0.0 - 1.3 10e3/uL    Absolute Eosinophils 0.0 0.0 - 0.7 10e3/uL    Absolute Basophils 0.0 0.0 - 0.2 10e3/uL    Absolute Immature Granulocytes 0.1 <=0.4 10e3/uL    Absolute NRBCs 0.0 10e3/uL   Extra Tube     Status: None    Narrative    The following orders were created for panel order Extra Tube.  Procedure                               Abnormality         Status                     ---------                               -----------         ------                     Extra Green Top (Lithium...[[id number]]                      Final result                 Please view results for these tests on the individual orders.   Extra Green Top (Lithium Heparin) Tube     Status: None   Result Value Ref Range    Hold Specimen extra    Adult  Type and Screen     Status: None   Result Value Ref Range    ABO/RH(D) A POS     Antibody Screen Negative Negative    SPECIMEN EXPIRATION DATE [id number]    CBC with platelets differential     Status: Abnormal    Narrative    The following orders were created for panel order CBC with platelets differential.  Procedure                               Abnormality         Status                     ---------                               -----------         ------                     CBC with platelets and d...[[id number]]  Abnormal            Final result                 Please view results for these tests on the individual orders.   ABO/Rh type and screen     Status: None    Narrative    The following orders were created for panel order ABO/Rh type and screen.  Procedure                               Abnormality         Status                     ---------                               -----------         ------                     Adult Type and Screen[[id number]]                            Edited Result - FINAL        Please view results for these tests on the individual orders.     Medications   melatonin tablet 1 mg (has no administration in time range)   lactated ringers infusion (has no administration in time range)   ondansetron (ZOFRAN ODT) ODT tab 4 mg (has no administration in time range)     Or   ondansetron (ZOFRAN) injection 4 mg (has no administration in time range)   prochlorperazine (COMPAZINE) injection 10 mg (has no administration in time range)     Or   prochlorperazine (COMPAZINE) tablet 10 mg (has no administration in time range)     Or   prochlorperazine (COMPAZINE) suppository 25 mg (has no administration in time range)   ceFEPIme (MAXIPIME) 1 g vial to attach to  mL bag for ADULTS or NS 50 mL bag for PEDS (has no administration in time range)   metroNIDAZOLE (FLAGYL) infusion 500 mg (has no administration in time range)   oxyCODONE (ROXICODONE) tablet 5 mg (has no administration in time  range)   HYDROmorphone (PF) (DILAUDID) injection 0.3-0.5 mg (has no administration in time range)   ondansetron (ZOFRAN) injection 4 mg (4 mg Intravenous $Given 9/30/23 0323)   HYDROmorphone (PF) (DILAUDID) injection 0.5 mg (0.5 mg Intravenous $Given 9/30/23 0323)     Labs Ordered and Resulted from Time of ED Arrival to Time of ED Departure - No data to display  No orders to display          Critical care was not performed.     Medical Decision Making  The patient's presentation was of high complexity (an acute health issue posing potential threat to life or bodily function).    The patient's evaluation involved:  review of external note(s) from 1 sources (ED visit [facility])  review of 3+ test result(s) ordered prior to this encounter (labs, CT imaging from [facility])  discussion of management or test interpretation with another health professional (surgery team)    The patient's management necessitated high risk (a parenteral controlled substance) and high risk (a decision regarding hospitalization).    Assessment & Plan    Parker Acevedo is a 52 year old male who  has a past medical history significant for short gut syndrome and severe malnutrition on chronic TPN with recent Cm placement, dysphagia, anxiety, recurrent CLABSI, recent admission 7/4-7/11 for MRSE bacteremia (s/p 4 weeks daptomycin, stopped on 8/2), LUE non-purulent cellulitis and catheter-associated septic subclavian DVT (on Lovenox) who presents to the emergency department as a transfer from [facility] for abdominal pain, CT scan with evidence of small bowel obstruction due to long segment intussusception of the proximal jejunum in the left upper quadrant.  Upon arrival patient is nontoxic-appearing, afebrile, moderate distress secondary to pain.  Patient received multiple doses of IV Dilaudid, Zofran, Compazine along with ketamine prior to arrival.  I reviewed comprehensive labs and CT imaging from [place]  [facility].  I discussed patient management with surgery team who evaluated patient emergency department, will start patient on IV antibiotics.  Patient refusing NG tube.  Plan to admit to surgery for surgical intervention this morning.  Understands and agrees with the plan.    I have reviewed the nursing notes. I have reviewed the findings, diagnosis, plan and need for follow up with the patient.    New Prescriptions    No medications on file       Final diagnoses:   Jejunal intussusception (H)       Giovanna Jaimes MD  [facility] EMERGENCY DEPARTMENT  9/30/2023     Giovanna Jaimes MD  09/30/23 0425

## 2023-09-30 NOTE — PROGRESS NOTES
Admitted/transferred from: PACU  2 RN full   skin assessment completed by Rocio Dyer, CHRISTY and Debbie REEVES RN.  Skin assessment finding: issues found Mid abdominal incision covered with island dressing, marked    Interventions/actions: other none      Will continue to monitor.

## 2023-09-30 NOTE — BRIEF OP NOTE
[facility]    Brief Operative Note    Pre-operative diagnosis: Small bowel obstruction due to intussusception   Post-operative diagnosis Same as pre-operative diagnosis    Procedure: Laparotomy exploratory, reduction of intussusception, resection of small bowel with primary anastamosis, upper endoscopy, N/A - Abdomen    Surgeon: Surgeon(s) and Role:     * Delvis Mercado MD - Primary     * Leighton Duenas MD - Resident - Assisting  Anesthesia: General   Estimated Blood Loss: 20 mL    Drains: None  Specimens:   ID Type Source Tests Collected by Time Destination   1 : previous abdominal scar skin Tissue Other SURGICAL PATHOLOGY EXAM Delvis Mercado MD 9/30/2023 11:01 AM    2 : segment of small bowel Tissue Other SURGICAL PATHOLOGY EXAM Delvis Mercado MD 9/30/2023 11:02 AM      Findings:   Dilated gm and BP limbs with intussusception of a mid jejunal limb into the gm limb and all the way into the JJ anastomosis into the BP limb. The intussesceptum was hyperemic but not ischemic, the leading point was almost ischemic and perforated after reduction of the intussusception.   .    Complications:   None.  Implants: * No implants in log *      Post op:  NPO - ice chips and meds  IV Protonix BID  Continue abx for 24 hours.  Pain control: tylenol, robaxin, gabapentin, dilaudid, oxycodone  G tube to gravity.  Labs tomorrow  DVT ppx with lovenox 40 mg daily tonight.     DAVE Castillo, FEYADIRA ([facility])  General Surgery PGY-5  *[phone]

## 2023-09-30 NOTE — ANESTHESIA PROCEDURE NOTES
Airway       Patient location during procedure: OR       Procedure Start/Stop Times: 9/30/2023 8:52 AM  Staff -        Anesthesiologist:  Behrens, Christopher J, MD       Resident/Fellow: Blas Iniguez MD       Performed By: with residents and resident       Procedure performed by resident/fellow/CRNA in presence of a teaching physician.    Consent for Airway        Urgency: elective  Indications and Patient Condition       Indications for airway management: marisol-procedural       Induction type:intravenous       Mask difficulty assessment: 0 - not attempted    Final Airway Details       Final airway type: endotracheal airway       Successful airway: ETT - single  Endotracheal Airway Details        ETT size (mm): 7.5       Cuffed: yes       Successful intubation technique: direct laryngoscopy and video laryngoscopy       VL Blade Size: MAC 3       Grade View of Cords: 1       Adjucts: stylet       Position: Right       Measured from: lips       Secured at (cm): 21       Bite block used: Soft    Post intubation assessment        Placement verified by: capnometry, equal breath sounds and chest rise        Number of attempts at approach: 1       Number of other approaches attempted: 0       Secured with: pink tape       Ease of procedure: easy       Dentition: Intact    Medication(s) Administered   Medication Administration Time: 9/30/2023 8:52 AM

## 2023-10-01 ENCOUNTER — APPOINTMENT (OUTPATIENT)
Dept: PHYSICAL THERAPY | Facility: CLINIC | Age: 53
DRG: 329 | End: 2023-10-01
Attending: STUDENT IN AN ORGANIZED HEALTH CARE EDUCATION/TRAINING PROGRAM
Payer: COMMERCIAL

## 2023-10-01 LAB
ANION GAP SERPL CALCULATED.3IONS-SCNC: 10 MMOL/L (ref 7–15)
BASOPHILS # BLD AUTO: 0 10E3/UL (ref 0–0.2)
BASOPHILS NFR BLD AUTO: 0 %
BUN SERPL-MCNC: 20.4 MG/DL (ref 6–20)
CALCIUM SERPL-MCNC: 7.8 MG/DL (ref 8.6–10)
CHLORIDE SERPL-SCNC: 106 MMOL/L (ref 98–107)
CREAT SERPL-MCNC: 0.76 MG/DL (ref 0.67–1.17)
DEPRECATED HCO3 PLAS-SCNC: 24 MMOL/L (ref 22–29)
EGFRCR SERPLBLD CKD-EPI 2021: >90 ML/MIN/1.73M2
EOSINOPHIL # BLD AUTO: 0 10E3/UL (ref 0–0.7)
EOSINOPHIL NFR BLD AUTO: 0 %
ERYTHROCYTE [DISTWIDTH] IN BLOOD BY AUTOMATED COUNT: 19.6 % (ref 10–15)
GLUCOSE SERPL-MCNC: 155 MG/DL (ref 70–99)
HCT VFR BLD AUTO: 33.1 % (ref 40–53)
HGB BLD-MCNC: 10.4 G/DL (ref 13.3–17.7)
IMM GRANULOCYTES # BLD: 0.2 10E3/UL
IMM GRANULOCYTES NFR BLD: 1 %
LYMPHOCYTES # BLD AUTO: 0.9 10E3/UL (ref 0.8–5.3)
LYMPHOCYTES NFR BLD AUTO: 4 %
MAGNESIUM SERPL-MCNC: 1.6 MG/DL (ref 1.7–2.3)
MCH RBC QN AUTO: 28.3 PG (ref 26.5–33)
MCHC RBC AUTO-ENTMCNC: 31.4 G/DL (ref 31.5–36.5)
MCV RBC AUTO: 90 FL (ref 78–100)
MONOCYTES # BLD AUTO: 1.3 10E3/UL (ref 0–1.3)
MONOCYTES NFR BLD AUTO: 6 %
NEUTROPHILS # BLD AUTO: 19.2 10E3/UL (ref 1.6–8.3)
NEUTROPHILS NFR BLD AUTO: 89 %
NRBC # BLD AUTO: 0 10E3/UL
NRBC BLD AUTO-RTO: 0 /100
PHOSPHATE SERPL-MCNC: 2.2 MG/DL (ref 2.5–4.5)
PLATELET # BLD AUTO: 156 10E3/UL (ref 150–450)
POTASSIUM SERPL-SCNC: 3.9 MMOL/L (ref 3.4–5.3)
RBC # BLD AUTO: 3.68 10E6/UL (ref 4.4–5.9)
SODIUM SERPL-SCNC: 140 MMOL/L (ref 135–145)
WBC # BLD AUTO: 21.5 10E3/UL (ref 4–11)

## 2023-10-01 PROCEDURE — 258N000003 HC RX IP 258 OP 636

## 2023-10-01 PROCEDURE — 85025 COMPLETE CBC W/AUTO DIFF WBC: CPT | Performed by: STUDENT IN AN ORGANIZED HEALTH CARE EDUCATION/TRAINING PROGRAM

## 2023-10-01 PROCEDURE — 120N000002 HC R&B MED SURG/OB UMMC

## 2023-10-01 PROCEDURE — 250N000011 HC RX IP 250 OP 636: Performed by: SURGERY

## 2023-10-01 PROCEDURE — 97530 THERAPEUTIC ACTIVITIES: CPT | Mod: GP

## 2023-10-01 PROCEDURE — 84100 ASSAY OF PHOSPHORUS: CPT | Performed by: SURGERY

## 2023-10-01 PROCEDURE — 250N000013 HC RX MED GY IP 250 OP 250 PS 637: Performed by: STUDENT IN AN ORGANIZED HEALTH CARE EDUCATION/TRAINING PROGRAM

## 2023-10-01 PROCEDURE — 250N000011 HC RX IP 250 OP 636: Mod: JZ | Performed by: STUDENT IN AN ORGANIZED HEALTH CARE EDUCATION/TRAINING PROGRAM

## 2023-10-01 PROCEDURE — 250N000013 HC RX MED GY IP 250 OP 250 PS 637: Performed by: SURGERY

## 2023-10-01 PROCEDURE — 83735 ASSAY OF MAGNESIUM: CPT | Performed by: STUDENT IN AN ORGANIZED HEALTH CARE EDUCATION/TRAINING PROGRAM

## 2023-10-01 PROCEDURE — 36415 COLL VENOUS BLD VENIPUNCTURE: CPT | Performed by: STUDENT IN AN ORGANIZED HEALTH CARE EDUCATION/TRAINING PROGRAM

## 2023-10-01 PROCEDURE — 258N000003 HC RX IP 258 OP 636: Performed by: SURGERY

## 2023-10-01 PROCEDURE — 3E0436Z INTRODUCTION OF NUTRITIONAL SUBSTANCE INTO CENTRAL VEIN, PERCUTANEOUS APPROACH: ICD-10-PCS | Performed by: SURGERY

## 2023-10-01 PROCEDURE — 258N000003 HC RX IP 258 OP 636: Performed by: STUDENT IN AN ORGANIZED HEALTH CARE EDUCATION/TRAINING PROGRAM

## 2023-10-01 PROCEDURE — C9113 INJ PANTOPRAZOLE SODIUM, VIA: HCPCS | Mod: JZ | Performed by: STUDENT IN AN ORGANIZED HEALTH CARE EDUCATION/TRAINING PROGRAM

## 2023-10-01 PROCEDURE — 97161 PT EVAL LOW COMPLEX 20 MIN: CPT | Mod: GP

## 2023-10-01 PROCEDURE — 80048 BASIC METABOLIC PNL TOTAL CA: CPT | Performed by: STUDENT IN AN ORGANIZED HEALTH CARE EDUCATION/TRAINING PROGRAM

## 2023-10-01 PROCEDURE — 250N000011 HC RX IP 250 OP 636: Performed by: STUDENT IN AN ORGANIZED HEALTH CARE EDUCATION/TRAINING PROGRAM

## 2023-10-01 PROCEDURE — 250N000009 HC RX 250: Performed by: SURGERY

## 2023-10-01 RX ORDER — POTASSIUM PHOS IN 0.9 % NACL 15MMOL/250
15 PLASTIC BAG, INJECTION (ML) INTRAVENOUS ONCE
Status: COMPLETED | OUTPATIENT
Start: 2023-10-01 | End: 2023-10-01

## 2023-10-01 RX ORDER — GABAPENTIN 100 MG/1
100 CAPSULE ORAL 3 TIMES DAILY
Qty: 21 CAPSULE | Refills: 0 | Status: ON HOLD | OUTPATIENT
Start: 2023-10-01 | End: 2023-11-14

## 2023-10-01 RX ORDER — ACETAMINOPHEN 325 MG/1
650 TABLET ORAL EVERY 4 HOURS PRN
Status: DISCONTINUED | OUTPATIENT
Start: 2023-10-03 | End: 2023-10-03 | Stop reason: HOSPADM

## 2023-10-01 RX ORDER — ACETAMINOPHEN 325 MG/10.15ML
975 LIQUID ORAL EVERY 8 HOURS
Qty: 1278.9 ML | Refills: 0 | Status: SHIPPED | OUTPATIENT
Start: 2023-10-01 | End: 2023-10-15

## 2023-10-01 RX ORDER — DEXTROSE MONOHYDRATE 100 MG/ML
INJECTION, SOLUTION INTRAVENOUS CONTINUOUS PRN
Status: DISCONTINUED | OUTPATIENT
Start: 2023-10-01 | End: 2023-10-01

## 2023-10-01 RX ORDER — AMOXICILLIN 250 MG
1 CAPSULE ORAL 2 TIMES DAILY
Qty: 60 TABLET | Refills: 0 | Status: SHIPPED | OUTPATIENT
Start: 2023-10-01 | End: 2023-10-31

## 2023-10-01 RX ORDER — DEXTROSE MONOHYDRATE 100 MG/ML
INJECTION, SOLUTION INTRAVENOUS CONTINUOUS PRN
Status: DISCONTINUED | OUTPATIENT
Start: 2023-10-01 | End: 2023-10-03 | Stop reason: HOSPADM

## 2023-10-01 RX ORDER — ERGOCALCIFEROL 1.25 MG/1
50000 CAPSULE, LIQUID FILLED ORAL WEEKLY
Status: DISCONTINUED | OUTPATIENT
Start: 2023-10-01 | End: 2023-10-03 | Stop reason: HOSPADM

## 2023-10-01 RX ORDER — ACETAMINOPHEN 325 MG/1
650 TABLET ORAL EVERY 4 HOURS
Status: DISCONTINUED | OUTPATIENT
Start: 2023-10-01 | End: 2023-10-01

## 2023-10-01 RX ORDER — MAGNESIUM SULFATE HEPTAHYDRATE 40 MG/ML
2 INJECTION, SOLUTION INTRAVENOUS ONCE
Status: COMPLETED | OUTPATIENT
Start: 2023-10-01 | End: 2023-10-01

## 2023-10-01 RX ORDER — METHOCARBAMOL 750 MG/1
750 TABLET, FILM COATED ORAL EVERY 6 HOURS PRN
Qty: 30 TABLET | Refills: 0 | Status: SHIPPED | OUTPATIENT
Start: 2023-10-01 | End: 2023-10-15

## 2023-10-01 RX ORDER — OXYCODONE HCL 5 MG/5 ML
5-10 SOLUTION, ORAL ORAL EVERY 4 HOURS PRN
Status: DISCONTINUED | OUTPATIENT
Start: 2023-10-01 | End: 2023-10-01

## 2023-10-01 RX ORDER — SODIUM CHLORIDE 9 MG/ML
INJECTION, SOLUTION INTRAVENOUS CONTINUOUS
Status: DISCONTINUED | OUTPATIENT
Start: 2023-10-01 | End: 2023-10-03 | Stop reason: HOSPADM

## 2023-10-01 RX ORDER — OXYCODONE HCL 5 MG/5 ML
5-10 SOLUTION, ORAL ORAL EVERY 4 HOURS PRN
Status: DISCONTINUED | OUTPATIENT
Start: 2023-10-01 | End: 2023-10-03 | Stop reason: HOSPADM

## 2023-10-01 RX ORDER — FENTANYL 25 UG/1
25 PATCH TRANSDERMAL
Status: DISCONTINUED | OUTPATIENT
Start: 2023-10-01 | End: 2023-10-03 | Stop reason: HOSPADM

## 2023-10-01 RX ADMIN — GABAPENTIN 100 MG: 100 CAPSULE ORAL at 08:02

## 2023-10-01 RX ADMIN — ERGOCALCIFEROL 50000 UNITS: 1.25 CAPSULE, LIQUID FILLED ORAL at 11:11

## 2023-10-01 RX ADMIN — SODIUM CHLORIDE, POTASSIUM CHLORIDE, SODIUM LACTATE AND CALCIUM CHLORIDE 1000 ML: 600; 310; 30; 20 INJECTION, SOLUTION INTRAVENOUS at 08:03

## 2023-10-01 RX ADMIN — ALPRAZOLAM 0.5 MG: 0.5 TABLET ORAL at 22:55

## 2023-10-01 RX ADMIN — OXYCODONE HYDROCHLORIDE 15 MG: 5 SOLUTION ORAL at 08:02

## 2023-10-01 RX ADMIN — DEXTROAMPHETAMINE SACCHARATE, AMPHETAMINE ASPARTATE, DEXTROAMPHETAMINE SULFATE AND AMPHETAMINE SULFATE 20 MG: 1.25; 1.25; 1.25; 1.25 TABLET ORAL at 08:01

## 2023-10-01 RX ADMIN — PROCHLORPERAZINE EDISYLATE 10 MG: 5 INJECTION INTRAMUSCULAR; INTRAVENOUS at 00:36

## 2023-10-01 RX ADMIN — SENNOSIDES AND DOCUSATE SODIUM 1 TABLET: 50; 8.6 TABLET ORAL at 20:39

## 2023-10-01 RX ADMIN — POTASSIUM PHOSPHATE, MONOBASIC POTASSIUM PHOSPHATE, DIBASIC 15 MMOL: 224; 236 INJECTION, SOLUTION, CONCENTRATE INTRAVENOUS at 16:42

## 2023-10-01 RX ADMIN — OXYCODONE HYDROCHLORIDE 15 MG: 5 SOLUTION ORAL at 04:12

## 2023-10-01 RX ADMIN — ONDANSETRON 4 MG: 2 INJECTION INTRAMUSCULAR; INTRAVENOUS at 04:12

## 2023-10-01 RX ADMIN — GABAPENTIN 100 MG: 100 CAPSULE ORAL at 13:07

## 2023-10-01 RX ADMIN — CARVEDILOL 12.5 MG: 12.5 TABLET, FILM COATED ORAL at 17:41

## 2023-10-01 RX ADMIN — POTASSIUM CHLORIDE, DEXTROSE MONOHYDRATE AND SODIUM CHLORIDE: 150; 5; 450 INJECTION, SOLUTION INTRAVENOUS at 07:10

## 2023-10-01 RX ADMIN — ONDANSETRON 4 MG: 2 INJECTION INTRAMUSCULAR; INTRAVENOUS at 20:33

## 2023-10-01 RX ADMIN — ENOXAPARIN SODIUM 40 MG: 40 INJECTION SUBCUTANEOUS at 08:02

## 2023-10-01 RX ADMIN — OXYCODONE HYDROCHLORIDE 10 MG: 5 SOLUTION ORAL at 11:30

## 2023-10-01 RX ADMIN — PROCHLORPERAZINE EDISYLATE 10 MG: 5 INJECTION INTRAMUSCULAR; INTRAVENOUS at 06:56

## 2023-10-01 RX ADMIN — SODIUM CHLORIDE: 9 INJECTION, SOLUTION INTRAVENOUS at 16:47

## 2023-10-01 RX ADMIN — PROCHLORPERAZINE MALEATE 10 MG: 5 TABLET ORAL at 17:46

## 2023-10-01 RX ADMIN — FENTANYL 1 PATCH: 25 PATCH TRANSDERMAL at 09:57

## 2023-10-01 RX ADMIN — MAGNESIUM SULFATE IN WATER 2 G: 40 INJECTION, SOLUTION INTRAVENOUS at 15:24

## 2023-10-01 RX ADMIN — OXYCODONE HYDROCHLORIDE 10 MG: 5 SOLUTION ORAL at 15:33

## 2023-10-01 RX ADMIN — PANTOPRAZOLE SODIUM 40 MG: 40 INJECTION, POWDER, FOR SOLUTION INTRAVENOUS at 20:39

## 2023-10-01 RX ADMIN — PANTOPRAZOLE SODIUM 40 MG: 40 INJECTION, POWDER, FOR SOLUTION INTRAVENOUS at 08:02

## 2023-10-01 RX ADMIN — OXYCODONE HYDROCHLORIDE 10 MG: 5 SOLUTION ORAL at 20:07

## 2023-10-01 RX ADMIN — MAGNESIUM SULFATE HEPTAHYDRATE: 500 INJECTION, SOLUTION INTRAMUSCULAR; INTRAVENOUS at 20:19

## 2023-10-01 RX ADMIN — GABAPENTIN 100 MG: 100 CAPSULE ORAL at 20:39

## 2023-10-01 RX ADMIN — OXYCODONE HYDROCHLORIDE 15 MG: 5 SOLUTION ORAL at 00:05

## 2023-10-01 RX ADMIN — ALPRAZOLAM 0.5 MG: 0.5 TABLET ORAL at 09:57

## 2023-10-01 RX ADMIN — SENNOSIDES AND DOCUSATE SODIUM 1 TABLET: 50; 8.6 TABLET ORAL at 08:02

## 2023-10-01 RX ADMIN — METHOCARBAMOL 750 MG: 750 TABLET, FILM COATED ORAL at 22:55

## 2023-10-01 RX ADMIN — CARVEDILOL 12.5 MG: 12.5 TABLET, FILM COATED ORAL at 08:02

## 2023-10-01 ASSESSMENT — ACTIVITIES OF DAILY LIVING (ADL)
ADLS_ACUITY_SCORE: 35

## 2023-10-01 NOTE — PROGRESS NOTES
"[facility]    Progress Note - EGS Service       Date of Admission:  9/30/2023    Assessment & Plan: Surgery   52M with complex surgical history following Cleestino-en-y bypass in 2002 who presents with 2 days of diffuse abdominal pain and nausea/vomiting coffee ground emesis. Hx of short gut syndrome. He has a new leukocytosis to 17.9, possibly febrile at OSH. CT with evidence of long-segment proximal jejunal intussusception causing SBO, no free air seen on scan. Patient is uncomfortable appearing but not peritonitic. He is now s/p ex lap with small bowel resection.     NPO - ice chips and meds, restarting home TPN  Concern for opioid withdrawal. Restarting home pain regimen.   Ambulate QID  IV Protonix BID  Continue abx for 24 hours.  Pain control: tylenol, robaxin, gabapentin, dilaudid, oxycodone  G tube to gravity.  Trending labs  DVT ppx with lovenox 40 mg daily tonight.     sitter and continuous pulse ox overnight.          Diet: NPO for Medical/Clinical Reasons Except for: Meds, Ice Chips    DVT Prophylaxis: Enoxaparin (Lovenox) SQ, Pneumatic Compression Devices, and Ambulate every shift  Sanchez Catheter: PRESENT, indication:    Lines: PRESENT      CVC Double Lumen Left Internal jugular Tunneled;Power injectable-Site Assessment: WDL      Drains: PRESENT         - 1 Biliary Drain(s)   Cardiac Monitoring: None  Code Status: Full Code      Clinically Significant Risk Factors          # Hypocalcemia: Lowest Ca = 7.8 mg/dL in last 2 days, will monitor and replace as appropriate   # Hypomagnesemia: Lowest Mg = 1.6 mg/dL in last 2 days, will replace as needed              # Overweight: Estimated body mass index is 27.2 kg/m  as calculated from the following:    Height as of 9/29/23: 1.803 m (5' 11\").    Weight as of 9/29/23: 88.5 kg (195 lb)., PRESENT ON ADMISSION            Disposition Plan     Expected Discharge Date: 10/04/2023                 The patient's care was " discussed with the Attending Physician, Dr. Mercado and Chief Resident/Fellow.    Bakari Castillo MD  [facility]  Non-urgent messages: Securely message with [employer] (more info)  Text page via [employer] [facility] Paging/Directory     ______________________________________________________________________    Interval History   NAEO. Sitter at bedside    Physical Exam   Vital Signs: Temp: 98.8  F (37.1  C) Temp src: Oral BP: (!) 141/85 Pulse: 101   Resp: 18 SpO2: 96 % O2 Device: Nasal cannula Oxygen Delivery: 3 LPM  Weight: 0 lbs 0 oz  Intake/Output Summary (Last 24 hours) at 10/1/2023 0934  Last data filed at 10/1/2023 0710  Gross per 24 hour   Intake 3354.17 ml   Output 2380 ml   Net 974.17 ml     Gen: alert   Chest: breathing non-labored  Abdomen: soft, appropriately tender, non-distended  Incision: clean, intact, with minimal strikethrough  Extremities: warm and well perfused             Data     I have personally reviewed the following data over the past 24 hrs:    21.5 (H)  \   10.4 (L)   / 156     140 106 20.4 (H) /  155 (H)   3.9 24 0.76 \       Imaging results reviewed over the past 24 hrs:   No results found for this or any previous visit (from the past 24 hour(s)).

## 2023-10-01 NOTE — PROGRESS NOTES
"[facility]    Progress Note - EGS Service       Date of Admission:  9/30/2023    Assessment & Plan: Surgery    52M with complex surgical history following Celestino-en-y bypass in 2002 who presents with 2 days of diffuse abdominal pain and nausea/vomiting coffee ground emesis. Hx of short gut syndrome. He has a new leukocytosis to 17.9, possibly febrile at OSH. CT with evidence of long-segment proximal jejunal intussusception causing SBO, no free air seen on scan. Patient is uncomfortable appearing but not peritonitic. He is now s/p ex lap with small bowel resection.     NPO - ice chips and meds  IV Protonix BID  Continue abx for 24 hours.  Pain control: tylenol, robaxin, gabapentin, dilaudid, oxycodone  G tube to gravity.  Labs tomorrow  DVT ppx with lovenox 40 mg daily tonight.     sitter and continuous pulse ox overnight.          Diet: NPO for Medical/Clinical Reasons Except for: Meds, Ice Chips    DVT Prophylaxis: Pneumatic Compression Devices  Sanchez Catheter: PRESENT, indication:    Lines: PRESENT      CVC Double Lumen Left Internal jugular Tunneled;Power injectable-Site Assessment: WDL      Drains: PRESENT         - 1 Biliary Drain(s)   Cardiac Monitoring: None  Code Status: Full Code      Clinically Significant Risk Factors Present on Admission          # Hypocalcemia: Lowest Ca = 8.4 mg/dL in last 2 days, will monitor and replace as appropriate      # Drug Induced Coagulation Defect: home medication list includes an anticoagulant medication         # Overweight: Estimated body mass index is 27.2 kg/m  as calculated from the following:    Height as of 9/29/23: 1.803 m (5' 11\").    Weight as of 9/29/23: 88.5 kg (195 lb).              Disposition Plan      Expected Discharge Date: 10/04/2023                 The patient's care was discussed with the Attending Physician, Dr. Mercado and Chief Resident/Fellow.    Bakari Castillo MD  [facility]  Non-urgent messages: Securely message with [employer] (more info)  Text page via [facility] Paging/Directory     ______________________________________________________________________    Interval History   Admitted overnight, per ED RN patients wife brought him some of her xanax.     Physical Exam   Vital Signs: Temp: 98.3  F (36.8  C) Temp src: Oral BP: 128/86 Pulse: 117   Resp: 17 SpO2: 95 % O2 Device: None (Room air) Oxygen Delivery: 4 LPM  Weight: 0 lbs 0 oz  Intake/Output Summary (Last 24 hours) at 9/30/2023 1906  Last data filed at 9/30/2023 1230  Gross per 24 hour   Intake 3000 ml   Output 2280 ml   Net 720 ml     Gen: somnolent but arousable.   Chest: breathing non-labored  Abdomen: soft, appropriately tender, non-distended  Incision: clean, intact, with minimal strikethrough  Extremities: warm and well perfused          Data     I have personally reviewed the following data over the past 24 hrs:    17.9 (H)  \   12.2 (L)   / 212     139 103 22.1 (H) /  169 (H)   3.9 24 0.69 \     ALT: 13 AST: 15 AP: 83 TBILI: 1.0   ALB: 3.6 TOT PROTEIN: 6.6 LIPASE: 11 (L)     INR:  1.10 PTT:  25   D-dimer:  N/A Fibrinogen:  N/A       Imaging results reviewed over the past 24 hrs:   Recent Results (from the past 24 hour(s))   CT Abdomen Pelvis w Contrast    Narrative    EXAM: CT ABDOMEN PELVIS W CONTRAST  LOCATION: [facility]  DATE: 9/29/2023    INDICATION: upper abd pain, vomiting, increased g tube output  COMPARISON: None.  TECHNIQUE: CT scan of the abdomen and pelvis was performed following injection of IV contrast. Multiplanar reformats were obtained. Dose reduction techniques were used.  CONTRAST: Isovue 370, 70mL    FINDINGS:   LOWER CHEST: Normal.    HEPATOBILIARY: Cholecystectomy clips, and mild biliary dilatation likely due to postcholecystectomy state. Liver parenchyma is unremarkable.    PANCREAS: Normal.    SPLEEN: Normal.    ADRENAL GLANDS:  Normal.    KIDNEYS/BLADDER: Normal.    BOWEL: There is a long segment intussusception in the jejunum in the left upper quadrant, with associated thickened edematous small bowel. The proximal jejunum and duodenum is dilated. Distal small bowel and colon are unremarkable. No pneumatosis, free   gas or free fluid. Postsurgical changes of the stomach. Percutaneous gastrostomy tube. Moderate hiatal hernia, and moderate fluid distention of the herniated stomach.    LYMPH NODES: Normal.    VASCULATURE: Unremarkable.    PELVIC ORGANS: Normal.    MUSCULOSKELETAL: Few tiny nodular soft tissue densities and foci of subcutaneous gas in the anterior abdominal wall likely injection related. No acute osseous abnormality.      Impression    IMPRESSION:   1.  Small bowel obstruction due to long segment intussusception of the proximal jejunum in the left upper quadrant. Thickened, edematous small bowel around the intussusception.       Findings discussed with Dr. Delgado 09/29/2023 11:52 PM

## 2023-10-01 NOTE — PROVIDER NOTIFICATION
[address]  pt c/o mouth & throat pain and is having difficulty swallowing tablets, can hurricane mouth spray be ordered?   Thanks, Dia HARRISON [phone]

## 2023-10-01 NOTE — PROGRESS NOTES
BP (!) 141/85 (BP Location: Right arm)   Pulse 101   Temp 98.8  F (37.1  C) (Oral)   Resp 18   SpO2 96%    Activity: Not OOB  Neuros:  A&O x4.   Cardiac:  WDL.   Respiratory:  XWDL  on 3L of O2 via NC, SpO2 91-95%  Denies SOB.  GI/:  G-tube to gravity. Voiding AUOP via Cath. Hypoactive BS no BM. Pt not passing flatus.   Diet: NPO, exempt ice chips and meds   Skin: surgical Incision covered with dressing.   Lines: R PIV infusing. CVC Double Lumen Left Internal jugular Tunneled;Power injectable-Site Assessment: WDL Pt refused CHG wipes.  Incisions/Drains: G-tube   Labs: Reviewed  Pain/nausea: Pt c/o pain and nausea, PRN meds given as prescribed and were effective.  New changes this shift: None   Plan:  Continue POC

## 2023-10-01 NOTE — PROGRESS NOTES
General Surgery Brief Progress Note  Surgery Cross-Cover    09/30/2023    Parker Acevedo is a 52 year old male POD#0 s/p Procedure(s):  Laparotomy exploratory, reduction of intussusception, resection of small bowel with primary anastamosis, upper endoscopy for Pre-Op Diagnosis Codes:     * Jejunal intussusception (H) [K56.1]    Patient awakens easily to voice, is alert and appropriately responsive to questions. Pt reports their pain is controlled with current regimen. Denies nausea, chest pain, or difficulty breathing. A 1:1 sitter is in room and reports no issues. On exam, patient is afebrile and breathing comfortably on room air. Abdomen is soft, minimally distended, tender to palpation in RUQ. Non-peritonitic. Oriented to self and situation.     /86 (BP Location: Right arm)   Pulse 109   Temp 98.3  F (36.8  C) (Oral)   Resp 17   SpO2 92%     A/P: No acute post-op issues. Continue plan of care per primary team. Please call with any questions.    Florence Mcdonough MD PGY-1  General Surgery Cross-Cover

## 2023-10-01 NOTE — PROGRESS NOTES
CLINICAL NUTRITION SERVICES - ASSESSMENT NOTE     Nutrition Prescription    RECOMMENDATIONS FOR MDs/PROVIDERS TO ORDER:  Continue to monitor and replace Mag and Phos as needed.    Malnutrition Status:    Patient does not meet two of the established criteria necessary for diagnosing malnutrition    Recommendations already ordered by Registered Dietitian (RD):  Dosing weight:  88.5 kg  Access: central  Initial parameters (per day)  Volume:  1000 mL x 12 hr cycle  Dextrose: 175 g  AA: 100 g  Lipids: 250 mL 20%, 3 days per week (Mon, Wed, Fri)  Dextrose titration: Resuming home TPN so OK to begin at goal dextrose. Lytes are above threshold for refeeding.  Additives: Infuvite 10 mL/d, Trelement 1 mL/d    Goal PN provides: 175 g dextrose, 100 g AA, and 250 mL 20% lipids 3 days per week for total provision of 1209 Kcals (23 Kcals/kg), 100 g/kg protein, GIR 3 mg/kg/minute, and 18% fat kcals on average daily.    Future/Additional Recommendations:  Monitor TPN adequacy, labs, wt trends  Monitor nutrition-related findings and follow pt per protocol     REASON FOR ASSESSMENT  Parker Acevedo is a/an 52 year old male assessed by the dietitian for Pharmacy/Nutrition to Start and Manage PN    NUTRITION HISTORY  Reviewed home TPN regimen with [facility] pharmacist:  M,W,F with lipids: Prosol 20% 100 gm/d , Dextrose 175 gm/d , Volume 1000 ml/d,  Intralipid 20% 250ml; Lytes per DAY:  Na 50 meq/d, K 80 meq/d, Ca 12  meq/d, Mag 6 meq/d, Phos15 mmol/d, 29% chloride, Infuvite 10 ml/day, Tralement1 ml/day. Cycled over 12 hrs with one hour taper up and one hour taper down.  T,Th,Sa,Alicea without lipids: Volume 1250 ml/d    Pt reports last run of TPN was ~3 days ago. Denies questions or concerns regarding starting home regimen but will reach out to dietitian as needs arise.    CURRENT NUTRITION ORDERS  Diet: NPO    LABS  Labs reviewed - includes   Mag 1.6 (L)    TG (WNL) 9/19, Alk Phos (WNL) 9/29, Bilis (WNL) 9/19    MEDICATIONS  Medications  reviewed - PPI, senna, miralax, vit D, zofran PRN, compazine PRN  IVF - 1L LR bolus, D5 1/2NS + Kcl 20 mEq/L @ 125 ml/hr    ANTHROPOMETRICS  Height: 5ft 11in  Admit wt 88.5 kg (195 lbs)  IBW: 78.2 kg  113% IBW   Overweight BMI 25-29.9    Weight History:   Wt Readings from Last 15 Encounters:   09/29/23 88.5 kg (195 lb)   09/19/23 89.8 kg (198 lb)   09/18/23 90.3 kg (199 lb)   09/05/23 89.8 kg (198 lb)   08/06/23 85 kg (187 lb 6.3 oz)   08/04/23 86.6 kg (191 lb)   07/11/23 86.8 kg (191 lb 6.4 oz)   06/07/23 87.7 kg (193 lb 6.4 oz)   06/04/23 87.1 kg (192 lb)   06/02/23 79 kg (174 lb 1.6 oz)   06/01/23 85.9 kg (189 lb 6.4 oz)   05/28/23 84.9 kg (187 lb 1.6 oz)   01/21/23 82.6 kg (182 lb)   01/13/23 85.3 kg (188 lb)   09/27/22 80.1 kg (176 lb 8 oz)     Dosing Weight: 88.5 kg (admit wt)    ASSESSED NUTRITION NEEDS  Estimated Energy Needs: 0173-6938 kcals/day (20 - 25 kcals/kg)  Justification: Maintenance  Estimated Protein Needs:  grams protein/day (1 - 1.2 grams of pro/kg)  Justification: Maintenance  Estimated Fluid Needs: (1 mL/kcal)   Justification: Maintenance or Per provider pending fluid status    PHYSICAL FINDINGS/OTHER FINDINGS  GI: s/p ex lap on 9/29 with small bowel resection with primary anastomosis 2/2 jejunal intussusception. Hx of gm-en-y 2002 and short gut syndrome on home TPN through [facility]  See malnutrition section below.    MALNUTRITION  % Intake: Decreased intake does not meet criteria  % Weight Loss: None noted  Subcutaneous Fat Loss: None observed  Muscle Loss: None observed  Fluid Accumulation/Edema: None noted  Malnutrition Diagnosis: Patient does not meet two of the established criteria necessary for diagnosing malnutrition    NUTRITION DIAGNOSIS  Altered GI function related to complex surgical hx as evidenced by chart review, requiring long-term TPN to meet nutrition needs.      INTERVENTIONS  Implementation  Collaboration with other providers - [facility], Pharmacist  Parenteral  Nutrition/IV Fluids - Initiate     Goals  Total avg nutritional intake to meet a minimum of 20 kcal/kg and 1 g PRO/kg daily (per dosing wt 88.5 kg).     Monitoring/Evaluation  Progress toward goals will be monitored and evaluated per protocol.  Oneyda Costello RD, LD  Weekend/Holiday RD pager: [phone]

## 2023-10-01 NOTE — OP NOTE
[facility]    Operative Note    Pre-operative diagnosis: Small bowel intussusception    Post-operative diagnosis *same as pre op diagnosis     Procedure: Procedure(s):  Laparotomy exploratory, reduction of intussusception, resection of small bowel with primary anastamosis, upper endoscopy   Surgeon: Surgeon(s) and Role:     * Delvis Mercado MD - Primary     * Leighton Duenas MD - Resident - Assisting   Anesthesia: General    Estimated blood loss: 20 mL   Drains: None   Specimens: ID Type Source Tests Collected by Time Destination   1 : previous abdominal scar skin Tissue Other SURGICAL PATHOLOGY EXAM Delvis Mercado MD 9/30/2023 11:01 AM    2 : segment of small bowel Tissue Other SURGICAL PATHOLOGY EXAM Delvis Mercado MD 9/30/2023 11:02 AM       Findings:  Dilated gm and BP limbs with long segment intussusception of a mid jejunal loop into the gm limb and all the way through the JJ anastomosis into the BP limb. The intussesceptum was hyperemic but not ischemic, the leading point was almost ischemic and perforated after reduction of the  intussusception.   . .    No free succus entericus; The small bowel was completely freed from ligament of Treitz to ileocecal valve;.    Gastric bypass anatomy was found (Gm anatomy).  Gm limb pathway was retro-colic. The bowel length from the JJ to the IC is about 5 meters.        Complications: None.   Implants: None.           INDICATIONS FOR PROCEDURE  Parker Acevedo is a 52 year old male with complex surgical history following Gm-en-y bypass in 2002 who presents with 2 days of diffuse abdominal pain and nausea/vomiting coffee ground emesis. Hx of short gut syndrome. He has a new leukocytosis to 17.9, possibly febrile at OSH. CT with evidence of long-segment proximal jejunal intussusception causing SBO, no free air seen on scan. Due to extensive surgical history, laparoscopic approach may prove too  challenging, so will plan for exploratory laparotomy.     After understanding the risks and benefits of proceeding with an exploratory laparotomy, he agreed to an operation.    General risks related to abdominal surgery were reviewed with the patient.    These include, but are not limited to, death, myocardial infarction, pneumonia, urinary tract infection, deep venous thrombosis with or without pulmonary embolus, abdominal infection from bowel injury or abscess, bowel obstruction, wound infection, and bleeding.      OPERATIVE PROCEDURE:  Under the benefits of general endotracheal anesthesia, the peritoneal cavity was entered through a vertical midline elliptical incision at the site of an old laparotomy scar. Multiple adhesions were encountered.      Adhesions between the abdominal wall and the underlying small bowel and omentum were completely dissected using a combination of blunt and electrocautery dissection. This portion of the procedure required 10 additional minutes of operating room time.    The intussusception was readily recognized with long segment of intussuscepting mid jejunal loop into the Celestino limb all the way through the JJ anastomosis approximately into the biliopancreatic limb.  The biliopancreatic limb was dilated and full with dark fluid resembling sloughing of the mucosa in addition to old bleeding, so upper endoscopy was done and suctioning of the fluids from the Celestino limb as well as from the biliopancreatic limb was done.  Then we turned our attention to reduction of the intussusception this was done by gentle traction of the intussusceptum to milk it from the Celestino limb and.  The reduced intussusceptum was hyperemic but without evidence of ischemia or perforation, at at the end of the reduction, and at the assumed leading point high-pressure encountered and the bowel found to be perforated with exposed mucosa.  So decision is made to transect this short segment of perforated bowel.  This was  done by white load on an Endo ASAEL stapler proximally and distally, also green load was used to transect the corresponding mesentery of small bowel.    The intussusceptum was covered with warm saline and FiO2 increased to 100% on the ventilator.    Then we turned our attention to fashion the anastomosis which was done in handsewn end-to-end fashion, in 2 layers using 4-0 PDS starting from the posterior continuous layer then making enterotomies around 3 cm in length and on both ends of the bowel.  Then we completed suturing the second posterior layer starting from the mid anastomosis and moving into both directions with Nabeel stitch taken at the edges to transition to the anterior layer which was completed in continuous fashion and then a second anterior Lembert layer was done with 4-0 PDS continuous fashion.  We assessed our anastomosis which admitted two fingers comfortably without evidence of narrowing.    The bowel was run and its major that the patient has 5 m from the JJ anastomosis all the way down to the ileocecal valve.  These bowel loops looked normal grossly without evidence of inflammation or disease.  There was no adhesions    Peritoneal lavage was performed using 1 L normal saline.    The abdomen was inspected for hemostasis.      The fascia was closed using 0-looped PDS and the subcutaneous tissue was irrigated with saline.  The skin was closed with skin staples.    Sterile dressings were applied.  The patient tolerated the procedure well.       Dr Mercado was scrubbed for all critical components of the operation.    All sponge and needle counts were correct x 2 at the end of the procedure.      DAVE Castillo, FEBS ([facility] [place])  General Surgery PGY-5  *[phone]

## 2023-10-01 NOTE — PROGRESS NOTES
"   10/01/23 1100   Appointment Info   Signing Clinician's Name / Credentials (PT) Thais Camp, PT, DPT   Rehab Comments (PT) abd prec   Living Environment   People in Home child(francheska), adult;spouse   Current Living Arrangements house   Home Accessibility stairs to enter home;stairs within home   Number of Stairs, Main Entrance 1   Stair Railings, Main Entrance none   Number of Stairs, Within Home, Primary greater than 10 stairs  (~ 14 to basement)   Stair Railings, Within Home, Primary railings safe and in good condition   Transportation Anticipated family or friend will provide   Living Environment Comments Pt reports that he lives with his spouse and adult son in a multi-level home, 1 CHARITY. Pt states the master bedroom/bathroom are on the first floor, flight of stairs to basement \"man cave\", denies immediate need to return to basement with all needs met on first floor. Pt reports bathroom has grab bars by toilet and in tub shower. Pt also reports he has and uses a shower chair at baseline for bathing. Pt states his wife works during the day, but his 25 y.o. son is able to assist as needed with wife assisting at night.   Self-Care   Usual Activity Tolerance moderate   Current Activity Tolerance poor  (limited by pain)   Regular Exercise No   Equipment Currently Used at Home cane, straight;grab bar, toilet;grab bar, tub/shower   Fall history within last six months no   Activity/Exercise/Self-Care Comment Pt reports at baseline he is independent with all ADLs, IADLs, and mobility. Prior to admission pt states he was able to navigate stairs to the basement and ambulate household and community distances, sometimes using a SPC for stability. Pt denies any recent falls.   General Information   Onset of Illness/Injury or Date of Surgery 09/30/23   Referring Physician Leighton Duenas MD   Patient/Family Therapy Goals Statement (PT) decrease pain   Pertinent History of Current Problem (include personal factors and/or " "comorbidities that impact the POC) Per EMR: \"\"52M with complex surgical history following Celestino-en-y bypass in 2002 who presents with 2 days of diffuse abdominal pain and nausea/vomiting coffee ground emesis. Hx of short gut syndrome. He has a new leukocytosis to 17.9, possibly febrile at OSH. CT with evidence of long-segment proximal jejunal intussusception causing SBO, no free air seen on scan. Patient is uncomfortable appearing but not peritonitic. He is now s/p ex lap with small bowel resection.\"   Existing Precautions/Restrictions fall;abdominal;oxygen therapy device and L/min;other (see comments)  (3 LPM O2 via NC)   Weight-Bearing Status - LUE partial weight-bearing (% in comments)  (< 10lbs per abdominal precautions)   Weight-Bearing Status - RUE partial weight-bearing (% in comments)  (< 10lbs per abdominal precautions)   Weight-Bearing Status - LLE full weight-bearing   Weight-Bearing Status - RLE full weight-bearing   General Observations Activity: up with assist   Cognition   Affect/Mental Status (Cognition) WFL;low arousal/lethargic   Orientation Status (Cognition) oriented x 4   Follows Commands (Cognition) follows one-step commands   Pain Assessment   Patient Currently in Pain Yes, see Vital Sign flowsheet  (5/10 at rest, 8/10 with activity at abdominal incision)   Integumentary/Edema   Integumentary/Edema other (describe)   Integumentary/Edema Comments surgical incision covered with dressing, R PIV, L IJ CVC, marina, G tube to gravity   Posture    Posture Forward head position;Protracted shoulders   Range of Motion (ROM)   Range of Motion ROM is WFL   Strength (Manual Muscle Testing)   Strength (Manual Muscle Testing) Deficits observed during functional mobility   Strength Comments generalized deconditioning/weakness, grossly at least 3+/5 LE strength, limited by post-op pain   Bed Mobility   Bed Mobility rolling left;rolling right;supine-sit;sit-supine   Rolling Left [place] (Bed Mobility) verbal " cues   Rolling Right [place] (Bed Mobility) verbal cues   Supine-Sit [place] (Bed Mobility) minimum assist (75% patient effort)   Sit-Supine [place] (Bed Mobility) contact guard   Impairments Contributing to Impaired Bed Mobility pain;decreased strength   Transfers   Transfers sit-stand transfer   Transfer Safety Concerns Noted decreased step length;decreased balance during turns   Impairments Contributing to Impaired Transfers pain;decreased strength   Sit-Stand Transfer   Sit-Stand [place] (Transfers) verbal cues;contact guard   Comment, (Sit-Stand Transfer) arm in arm / HHA once standing   Gait/Stairs (Locomotion)   [place] Level (Gait) contact guard   Assistive Device (Gait) other (see comments)  (HHA)   Distance in Feet 5ft x 2   Pattern (Gait) step-to   Deviations/Abnormal Patterns (Gait) antalgic;base of support, narrow;roe decreased;gait speed decreased;weight shifting decreased   Balance   Balance other (describe)   Balance Comments sitting balance WFL; CGA for static and dynamic standing balance   Sensory Examination   Sensory Perception patient reports no sensory changes   Coordination   Coordination no deficits were identified   Muscle Tone   Muscle Tone no deficits were identified   Clinical Impression   Criteria for Skilled Therapeutic Intervention Yes, treatment indicated   PT Diagnosis (PT) impaired functional mobility   Influenced by the following impairments post-op precautions and pain, generalized weakness and deconditioning, impaired balance   Functional limitations due to impairments bed mobility, transfers, ambulation, stairs, balance, activity tolerance   Clinical Presentation (PT Evaluation Complexity) Stable/Uncomplicated   Clinical Presentation Rationale clinical judgement   Clinical Decision Making (Complexity) low complexity   Planned Therapy Interventions (PT) balance training;bed mobility training;gait training;home exercise program;neuromuscular  re-education;patient/family education;stair training;strengthening;transfer training;progressive activity/exercise;risk factor education;home program guidelines;postural re-education   Anticipated Equipment Needs at Discharge (PT) walker, standard  (tbd)   Risk & Benefits of therapy have been explained evaluation/treatment results reviewed;care plan/treatment goals reviewed;risks/benefits reviewed;current/potential barriers reviewed;participants voiced agreement with care plan;participants included;patient;spouse/significant other   PT Total Evaluation Time   PT Eval, Low Complexity Minutes (57047) 8   Plan of Care Review   Plan of Care Reviewed With patient;spouse   Physical Therapy Goals   PT Frequency 6x/week   PT Predicted Duration/Target Date for Goal Attainment 10/22/23   PT Goals Bed Mobility;Transfers;Gait;Stairs   PT: Bed Mobility Modified independent;Supine to/from sit;Rolling;Bridging;Within precautions   PT: Transfers Modified independent;Sit to/from stand;Bed to/from chair;Assistive device;Within precautions  (LRAD (SPC vc. FWW))   PT: Gait Modified independent;Assistive device;Within precautions;Greater than 200 feet  (LRAD (SPC vc. FWW))   PT: Stairs Greater than 10 stairs;Supervision/stand-by assist  (railings per home set-up)   PT Discharge Planning   PT Plan review abdominal precautions and log rolling from flat bed, progress gait with FWW (eventually SPC if appropriate), stairs when able, assess for DME needs with gait (FWW)   PT Discharge Recommendation (DC Rec) home with assist;home with home care physical therapy   PT Rationale for DC Rec Pt presents below IND baseline, limited by high pain levels post-op impairing overall activity tolerance and placing him at an increased risk for falls. Pt and wife report 24/7 support available at discharge. Anticipate with improved pain control, increased time spent OOB, and continued PT/OT intervention, pt will progress to level of independence appropriate  for discharge home with family assist for IADLs/ADLs that require lifting > 10lbs. Recommend HHPT to assess pt safety within home, progress functional strength, and increase independence and safety with mobility for return to PLOF.   PT Brief overview of current status SB-CGA with FWW   Total Session Time   Total Session Time (sum of timed and untimed services) 8

## 2023-10-01 NOTE — PROVIDER NOTIFICATION
7B 40 Curtis LAWS pt oral temp 99.1, recheck 100.5 & pt HR has been in 110s consistently, please advise.  Thanks, Dia HARRISON [phone]

## 2023-10-01 NOTE — PLAN OF CARE
Goal Outcome Evaluation:      Plan of Care Reviewed With: patient    Overall Patient Progress: no change    A&Ox4, somnolent throughout shift, on RA. Refused capno-continuous pulse ox in place. Sitter at bedside for safety. Gastrostomy to gravity. PRN zofran & compazine given for nausea. Pain managed w/ oxycodone solution. Continue POC

## 2023-10-02 ENCOUNTER — APPOINTMENT (OUTPATIENT)
Dept: PHYSICAL THERAPY | Facility: CLINIC | Age: 53
DRG: 329 | End: 2023-10-02
Payer: COMMERCIAL

## 2023-10-02 ENCOUNTER — APPOINTMENT (OUTPATIENT)
Dept: OCCUPATIONAL THERAPY | Facility: CLINIC | Age: 53
DRG: 329 | End: 2023-10-02
Attending: STUDENT IN AN ORGANIZED HEALTH CARE EDUCATION/TRAINING PROGRAM
Payer: COMMERCIAL

## 2023-10-02 ENCOUNTER — PATIENT OUTREACH (OUTPATIENT)
Dept: CARE COORDINATION | Facility: CLINIC | Age: 53
End: 2023-10-02
Payer: COMMERCIAL

## 2023-10-02 LAB
ALBUMIN SERPL BCG-MCNC: 2.5 G/DL (ref 3.5–5.2)
ALP SERPL-CCNC: 54 U/L (ref 40–129)
ALT SERPL W P-5'-P-CCNC: 8 U/L (ref 0–70)
ANION GAP SERPL CALCULATED.3IONS-SCNC: 6 MMOL/L (ref 7–15)
AST SERPL W P-5'-P-CCNC: 18 U/L (ref 0–45)
BILIRUB DIRECT SERPL-MCNC: <0.2 MG/DL (ref 0–0.3)
BILIRUB SERPL-MCNC: 0.3 MG/DL
BUN SERPL-MCNC: 17.6 MG/DL (ref 6–20)
CALCIUM SERPL-MCNC: 8.6 MG/DL (ref 8.6–10)
CHLORIDE SERPL-SCNC: 104 MMOL/L (ref 98–107)
CREAT SERPL-MCNC: 0.58 MG/DL (ref 0.67–1.17)
DEPRECATED HCO3 PLAS-SCNC: 26 MMOL/L (ref 22–29)
EGFRCR SERPLBLD CKD-EPI 2021: >90 ML/MIN/1.73M2
GLUCOSE BLDC GLUCOMTR-MCNC: 105 MG/DL (ref 70–99)
GLUCOSE BLDC GLUCOMTR-MCNC: 116 MG/DL (ref 70–99)
GLUCOSE BLDC GLUCOMTR-MCNC: 98 MG/DL (ref 70–99)
GLUCOSE SERPL-MCNC: 115 MG/DL (ref 70–99)
HOLD SPECIMEN: NORMAL
INR PPP: 1.2 (ref 0.85–1.15)
MAGNESIUM SERPL-MCNC: 2 MG/DL (ref 1.7–2.3)
PHOSPHATE SERPL-MCNC: 1.6 MG/DL (ref 2.5–4.5)
PHOSPHATE SERPL-MCNC: 3 MG/DL (ref 2.5–4.5)
POTASSIUM SERPL-SCNC: 4.3 MMOL/L (ref 3.4–5.3)
PREALB SERPL IA-MCNC: 12 MG/DL (ref 15–45)
PROT SERPL-MCNC: 5.4 G/DL (ref 6.4–8.3)
SODIUM SERPL-SCNC: 136 MMOL/L (ref 135–145)
TRIGL SERPL-MCNC: 68 MG/DL

## 2023-10-02 PROCEDURE — 250N000013 HC RX MED GY IP 250 OP 250 PS 637: Performed by: STUDENT IN AN ORGANIZED HEALTH CARE EDUCATION/TRAINING PROGRAM

## 2023-10-02 PROCEDURE — B4185 PARENTERAL SOL 10 GM LIPIDS: HCPCS | Mod: JZ | Performed by: SURGERY

## 2023-10-02 PROCEDURE — 250N000009 HC RX 250: Mod: JZ | Performed by: SURGERY

## 2023-10-02 PROCEDURE — 84134 ASSAY OF PREALBUMIN: CPT | Performed by: STUDENT IN AN ORGANIZED HEALTH CARE EDUCATION/TRAINING PROGRAM

## 2023-10-02 PROCEDURE — 97530 THERAPEUTIC ACTIVITIES: CPT | Mod: GO

## 2023-10-02 PROCEDURE — 36415 COLL VENOUS BLD VENIPUNCTURE: CPT | Performed by: STUDENT IN AN ORGANIZED HEALTH CARE EDUCATION/TRAINING PROGRAM

## 2023-10-02 PROCEDURE — 250N000011 HC RX IP 250 OP 636: Mod: JZ | Performed by: STUDENT IN AN ORGANIZED HEALTH CARE EDUCATION/TRAINING PROGRAM

## 2023-10-02 PROCEDURE — 84100 ASSAY OF PHOSPHORUS: CPT | Performed by: STUDENT IN AN ORGANIZED HEALTH CARE EDUCATION/TRAINING PROGRAM

## 2023-10-02 PROCEDURE — 36415 COLL VENOUS BLD VENIPUNCTURE: CPT | Performed by: SURGERY

## 2023-10-02 PROCEDURE — 120N000002 HC R&B MED SURG/OB UMMC

## 2023-10-02 PROCEDURE — 250N000011 HC RX IP 250 OP 636: Performed by: SURGERY

## 2023-10-02 PROCEDURE — C9113 INJ PANTOPRAZOLE SODIUM, VIA: HCPCS | Mod: JZ | Performed by: STUDENT IN AN ORGANIZED HEALTH CARE EDUCATION/TRAINING PROGRAM

## 2023-10-02 PROCEDURE — 97530 THERAPEUTIC ACTIVITIES: CPT | Mod: GP

## 2023-10-02 PROCEDURE — 84100 ASSAY OF PHOSPHORUS: CPT | Performed by: SURGERY

## 2023-10-02 PROCEDURE — 250N000013 HC RX MED GY IP 250 OP 250 PS 637

## 2023-10-02 PROCEDURE — 97165 OT EVAL LOW COMPLEX 30 MIN: CPT | Mod: GO

## 2023-10-02 PROCEDURE — 80053 COMPREHEN METABOLIC PANEL: CPT | Performed by: STUDENT IN AN ORGANIZED HEALTH CARE EDUCATION/TRAINING PROGRAM

## 2023-10-02 PROCEDURE — 250N000011 HC RX IP 250 OP 636: Performed by: STUDENT IN AN ORGANIZED HEALTH CARE EDUCATION/TRAINING PROGRAM

## 2023-10-02 PROCEDURE — 84478 ASSAY OF TRIGLYCERIDES: CPT | Performed by: SURGERY

## 2023-10-02 PROCEDURE — 85610 PROTHROMBIN TIME: CPT | Performed by: STUDENT IN AN ORGANIZED HEALTH CARE EDUCATION/TRAINING PROGRAM

## 2023-10-02 PROCEDURE — 82248 BILIRUBIN DIRECT: CPT | Performed by: STUDENT IN AN ORGANIZED HEALTH CARE EDUCATION/TRAINING PROGRAM

## 2023-10-02 PROCEDURE — 258N000003 HC RX IP 258 OP 636: Performed by: SURGERY

## 2023-10-02 PROCEDURE — 97116 GAIT TRAINING THERAPY: CPT | Mod: GP

## 2023-10-02 PROCEDURE — 83735 ASSAY OF MAGNESIUM: CPT | Performed by: STUDENT IN AN ORGANIZED HEALTH CARE EDUCATION/TRAINING PROGRAM

## 2023-10-02 RX ORDER — CALCIUM CARBONATE 500 MG/1
500 TABLET, CHEWABLE ORAL ONCE
Status: COMPLETED | OUTPATIENT
Start: 2023-10-02 | End: 2023-10-02

## 2023-10-02 RX ORDER — MAGNESIUM SULFATE HEPTAHYDRATE 40 MG/ML
2 INJECTION, SOLUTION INTRAVENOUS ONCE
Status: COMPLETED | OUTPATIENT
Start: 2023-10-02 | End: 2023-10-02

## 2023-10-02 RX ADMIN — CALCIUM CARBONATE (ANTACID) CHEW TAB 500 MG 500 MG: 500 CHEW TAB at 23:33

## 2023-10-02 RX ADMIN — OXYCODONE HYDROCHLORIDE 10 MG: 5 SOLUTION ORAL at 17:19

## 2023-10-02 RX ADMIN — ONDANSETRON 4 MG: 4 TABLET, ORALLY DISINTEGRATING ORAL at 11:26

## 2023-10-02 RX ADMIN — MAGNESIUM SULFATE HEPTAHYDRATE: 500 INJECTION, SOLUTION INTRAMUSCULAR; INTRAVENOUS at 21:48

## 2023-10-02 RX ADMIN — OXYCODONE HYDROCHLORIDE 10 MG: 5 SOLUTION ORAL at 08:38

## 2023-10-02 RX ADMIN — GABAPENTIN 100 MG: 100 CAPSULE ORAL at 21:34

## 2023-10-02 RX ADMIN — OXYCODONE HYDROCHLORIDE 10 MG: 5 SOLUTION ORAL at 00:15

## 2023-10-02 RX ADMIN — PANTOPRAZOLE SODIUM 40 MG: 40 INJECTION, POWDER, FOR SOLUTION INTRAVENOUS at 21:33

## 2023-10-02 RX ADMIN — OLIVE OIL AND SOYBEAN OIL 250 ML: 16; 4 INJECTION, EMULSION INTRAVENOUS at 22:03

## 2023-10-02 RX ADMIN — CARVEDILOL 12.5 MG: 12.5 TABLET, FILM COATED ORAL at 17:19

## 2023-10-02 RX ADMIN — Medication 9 MMOL: at 17:20

## 2023-10-02 RX ADMIN — CARVEDILOL 12.5 MG: 12.5 TABLET, FILM COATED ORAL at 08:20

## 2023-10-02 RX ADMIN — PROCHLORPERAZINE EDISYLATE 10 MG: 5 INJECTION INTRAMUSCULAR; INTRAVENOUS at 21:33

## 2023-10-02 RX ADMIN — METHOCARBAMOL 750 MG: 750 TABLET, FILM COATED ORAL at 08:19

## 2023-10-02 RX ADMIN — PANTOPRAZOLE SODIUM 40 MG: 40 INJECTION, POWDER, FOR SOLUTION INTRAVENOUS at 08:18

## 2023-10-02 RX ADMIN — ALPRAZOLAM 0.5 MG: 0.5 TABLET ORAL at 08:38

## 2023-10-02 RX ADMIN — OXYCODONE HYDROCHLORIDE 10 MG: 5 SOLUTION ORAL at 21:34

## 2023-10-02 RX ADMIN — OXYCODONE HYDROCHLORIDE 10 MG: 5 SOLUTION ORAL at 12:44

## 2023-10-02 RX ADMIN — MAGNESIUM SULFATE IN WATER 2 G: 40 INJECTION, SOLUTION INTRAVENOUS at 11:22

## 2023-10-02 RX ADMIN — PROCHLORPERAZINE EDISYLATE 10 MG: 5 INJECTION INTRAMUSCULAR; INTRAVENOUS at 12:50

## 2023-10-02 RX ADMIN — GABAPENTIN 100 MG: 100 CAPSULE ORAL at 08:20

## 2023-10-02 RX ADMIN — OXYCODONE HYDROCHLORIDE 10 MG: 5 SOLUTION ORAL at 04:41

## 2023-10-02 RX ADMIN — ENOXAPARIN SODIUM 40 MG: 40 INJECTION SUBCUTANEOUS at 08:17

## 2023-10-02 RX ADMIN — ALPRAZOLAM 0.5 MG: 0.5 TABLET ORAL at 21:34

## 2023-10-02 RX ADMIN — DEXTROAMPHETAMINE SACCHARATE, AMPHETAMINE ASPARTATE, DEXTROAMPHETAMINE SULFATE AND AMPHETAMINE SULFATE 20 MG: 1.25; 1.25; 1.25; 1.25 TABLET ORAL at 08:20

## 2023-10-02 RX ADMIN — PROCHLORPERAZINE MALEATE 10 MG: 5 TABLET ORAL at 00:17

## 2023-10-02 RX ADMIN — Medication 9 MMOL: at 12:28

## 2023-10-02 RX ADMIN — ONDANSETRON 4 MG: 4 TABLET, ORALLY DISINTEGRATING ORAL at 17:19

## 2023-10-02 RX ADMIN — GABAPENTIN 100 MG: 100 CAPSULE ORAL at 14:17

## 2023-10-02 ASSESSMENT — ACTIVITIES OF DAILY LIVING (ADL)
CONCENTRATING,_REMEMBERING_OR_MAKING_DECISIONS_DIFFICULTY: NO
ADLS_ACUITY_SCORE: 35
ADLS_ACUITY_SCORE: 27
WALKING_OR_CLIMBING_STAIRS_DIFFICULTY: NO
ADLS_ACUITY_SCORE: 27
WEAR_GLASSES_OR_BLIND: YES
EATING: 0-->INDEPENDENT
DOING_ERRANDS_INDEPENDENTLY_DIFFICULTY: NO
EATING/SWALLOWING_MANAGEMENT: TPN
ADLS_ACUITY_SCORE: 27
EATING: 0-->INDEPENDENT
ADLS_ACUITY_SCORE: 27
ADLS_ACUITY_SCORE: 27
ADLS_ACUITY_SCORE: 35
DRESSING/BATHING_DIFFICULTY: NO
HEARING_DIFFICULTY_OR_DEAF: NO
SWALLOWING: 2-->DIFFICULTY SWALLOWING LIQUIDS
VISION_MANAGEMENT: GLASSES
DEPENDENT_IADLS:: INDEPENDENT
ADLS_ACUITY_SCORE: 35
ADLS_ACUITY_SCORE: 35
ADLS_ACUITY_SCORE: 27
ADLS_ACUITY_SCORE: 27
EATING/SWALLOWING: OTHER (SEE COMMENTS)
TOILETING_ISSUES: NO
ADLS_ACUITY_SCORE: 27
DIFFICULTY_COMMUNICATING: NO
DIFFICULTY_EATING/SWALLOWING: YES
CHANGE_IN_FUNCTIONAL_STATUS_SINCE_ONSET_OF_CURRENT_ILLNESS/INJURY: NO
SWALLOWING: 2-->DIFFICULTY SWALLOWING LIQUIDS

## 2023-10-02 NOTE — PROGRESS NOTES
Clinic Care Coordination Contact  Ambulatory Care Coordination to Inpatient Care Management   Hand-In Communication    Date:  October 2, 2023  Name: Parker Acevedo is enrolled in Ambulatory Care Coordination program and I am the Lead Care Coordinator.  CC Contact Information: Epic InReadbugsket + phone  Payor Source: Payor: [facility] / Plan: [facility] MEDICARE ADVANTAGE / Product Type: Medicare /   Current services in place:     Please see the CC Snaphot and Care Management Flowsheets for specific  details of this Parker Acevedo care plan.   Additional details/specific concerns r/t this admission:    No additional concerns at this time .    I will follow this admission in Epic. Please feel free to contact me with questions or for further collaboration in discharge planning.    Kinsey Muhammad RN Care Coordination   [facility]  Email: [email or web address]  Phone: [phone]

## 2023-10-02 NOTE — PLAN OF CARE
Goal Outcome Evaluation:      Plan of Care Reviewed With: patient, spouse    Overall Patient Progress: improvingOverall Patient Progress: improving    Outcome Evaluation: Pt is alert and oriented x4, He is able to make his needs known to caregiver. G tube to gravity was disconnected by patent. He claims when it not draining he disconnect it. There is no C/O GI discomfort. NPO continues throughout the day with the exception of ice chips. Pain is controlled with oxycodone 10 mg PO. Mag and Phos were repalced and scheduled for AM draw. Pt is scheduled to start TPN  tonight. He is vitally stable. Author did turn off pulsate due to frequent distraction to patient, he is vitally and intermittently on RA with no SOB symptoms.

## 2023-10-02 NOTE — PROGRESS NOTES
10/02/23 0900   Appointment Info   Signing Clinician's Name / Credentials (OT) ABEL Sterling   Student Supervision Direct supervision provided       Present no   Language english   Living Environment   People in Home child(francheska), adult;spouse   Current Living Arrangements house   Home Accessibility stairs to enter home;stairs within home   Number of Stairs, Main Entrance 1   Stair Railings, Main Entrance none   Number of Stairs, Within Home, Primary greater than 10 stairs   Stair Railings, Within Home, Primary railings safe and in good condition   Transportation Anticipated family or friend will provide   Living Environment Comments Per PT eval, Pt lives in a multi level home with spouse and adult son. Pt has more than 10 steps within home. Pt has a tub shower and uses a shower chair at baseline.   Self-Care   Usual Activity Tolerance moderate   Current Activity Tolerance fair   Regular Exercise No   Equipment Currently Used at Home cane, straight;grab bar, toilet;grab bar, tub/shower   Fall history within last six months no   Activity/Exercise/Self-Care Comment Pt was previously IND with ADL completion at baseline.   Instrumental Activities of Daily Living (IADL)   IADL Comments Pt states wife assists mainly with household management and med manag. Pt is dependent on adult son for driving as he does not drive anymore.   General Information   Onset of Illness/Injury or Date of Surgery 09/30/23   Referring Physician Leighton Duenas MD   Patient/Family Therapy Goal Statement (OT) Pt would like to return home.   Additional Occupational Profile Info/Pertinent History of Current Problem Parker Acevedo is a 52 year old male with h/o multiple abdominal surgerys now POD#0 s/p ex lap with small bowel resection.   Existing Precautions/Restrictions abdominal;fall   Left Upper Extremity (Weight-bearing Status) weight-bearing as tolerated (WBAT)  (10lbs per abd precautions)   Right Upper Extremity  (Weight-bearing Status) weight-bearing as tolerated (WBAT)  (10lbs per abd precautions)   Left Lower Extremity (Weight-bearing Status) full weight-bearing (FWB)   Right Lower Extremity (Weight-bearing Status) full weight-bearing (FWB)   General Observations and Info Supine in bed, agreeable to therapy   Cognitive Status Examination   Orientation Status orientation to person, place and time   Behavioral Issues overwhelmed easily   Cognitive Status Comments Pt is oriented and able to follow all basic commands.   Visual Perception   Visual Impairment/Limitations WFL   Sensory   Sensory Comments WFL   Pain Assessment   Patient Currently in Pain Yes, see Vital Sign flowsheet   Range of Motion Comprehensive   Comment, General Range of Motion Not formally assessed due to pain.   Strength Comprehensive (MMT)   Comment, General Manual Muscle Testing (MMT) Assessment Not formally assessed due to pain. However per observation BUE grossly 5/5   Bed Mobility   Bed Mobility supine-sit   Supine-Sit [place] (Bed Mobility) verbal cues;minimum assist (75% patient effort)   Assistive Device (Bed Mobility) bed rails   Comment (Bed Mobility) Pt was impulsive, required vc for line management   Transfers   Transfers sit-stand transfer   Sit-Stand Transfer   Sit-Stand [place] (Transfers) supervision   Assistive Device (Sit-Stand Transfers) walker, front-wheeled   Balance   Balance Comments Pt ambulated ~5ft with FWW +  SBA/CGA   Activities of Daily Living   BADL Assessment/Intervention bathing;upper body dressing;lower body dressing;grooming;toileting   Bathing Assessment/Intervention   [place] Level (Bathing) minimum assist (75% patient effort)   Comment, (Bathing) Per clinical judgement   Upper Body Dressing Assessment/Training   Comment, (Upper Body Dressing) Per clinical judgement   [place] Level (Upper Body Dressing) minimum assist (75% patient effort)   Lower Body Dressing Assessment/Training   Comment, (Lower  Body Dressing) Per clinical judgement   [place] Level (Lower Body Dressing) minimum assist (75% patient effort)   Grooming Assessment/Training   [place] Level (Grooming) minimum assist (75% patient effort)   Comment, (Grooming) Per clinical judgement   Toileting   Comment, (Toileting) Per clinical judgement   [place] Level (Toileting) minimum assist (75% patient effort)   Clinical Impression   Criteria for Skilled Therapeutic Interventions Met (OT) Yes, treatment indicated   OT Diagnosis decreased activity tolerance and IND with ADL completion   Influenced by the following impairments post surgical precautions, pain, weakness, fatigue   OT Problem List-Impairments impacting ADL problems related to;activity tolerance impaired;range of motion (ROM);strength;post-surgical precautions;pain   Assessment of Occupational Performance 3-5 Performance Deficits   Identified Performance Deficits g/h, tolieting, bathing, dressing, functional mobility   Planned Therapy Interventions (OT) ADL retraining;IADL retraining;ROM;strengthening;home program guidelines;progressive activity/exercise   Clinical Decision Making Complexity (OT) low complexity   Anticipated Equipment Needs Upon Discharge (OT)   (tbd with further therapy)   Risk & Benefits of therapy have been explained evaluation/treatment results reviewed;care plan/treatment goals reviewed;risks/benefits reviewed;patient;current/potential barriers reviewed;participants voiced agreement with care plan;participants included   OT Total Evaluation Time   OT Eval, Low Complexity Minutes (48564) 9

## 2023-10-02 NOTE — CONSULTS
Care Management Initial Consult    General Information  Assessment completed with: Patient,    Type of CM/SW Visit: Initial Assessment    Primary Care Provider verified and updated as needed: Yes   Readmission within the last 30 days:        Reason for Consult: discharge planning  Advance Care Planning: Advance Care Planning Reviewed: present on chart          Communication Assessment  Patient's communication style: spoken language (English or Bilingual)    Hearing Difficulty or Deaf: no   Wear Glasses or Blind: yes    Cognitive  Cognitive/Neuro/Behavioral: WDL  Level of Consciousness: alert  Arousal Level: opens eyes spontaneously, arouses to pain, arouses to voice  Orientation: oriented x 4  Mood/Behavior: calm, cooperative  Best Language: 0 - No aphasia  Speech: slow    Living Environment:   People in home: spouse, child(francheska), adult     Current living Arrangements: house      Able to return to prior arrangements: yes       Family/Social Support:  Care provided by: self  Provides care for:    Marital Status:   Wife, Children          Description of Support System: Supportive, Involved    Support Assessment: Adequate family and caregiver support, Adequate social supports    Current Resources:   Patient receiving home care services: Yes  Skilled Home Care Services: Skilled Nursing  Community Resources: Infusion Services  Equipment currently used at home: cane, straight, grab bar, toilet, grab bar, tub/shower  Supplies currently used at home:      Employment/Financial:  Employment Status: disabled        Financial Concerns: No concerns identified           Does the patient's insurance plan have a 3 day qualifying hospital stay waiver?  Yes     Which insurance plan 3 day waiver is available? Alternative insurance waiver    Will the waiver be used for post-acute placement? No    Lifestyle & Psychosocial Needs:  Social Determinants of Health     Food Insecurity: No Food Insecurity (5/20/2022)    Hunger Vital Sign      Worried About Running Out of Food in the Last Year: Never true     Ran Out of Food in the Last Year: Never true   Depression: Not at risk (2/8/2023)    PHQ-2     PHQ-2 Score: 0   Housing Stability: Low Risk  (8/4/2020)    Housing Stability Vital Sign     Unable to Pay for Housing in the Last Year: No     Number of Places Lived in the Last Year: 1     Unstable Housing in the Last Year: No   Tobacco Use: High Risk (10/2/2023)    Patient History     Smoking Tobacco Use: Light Smoker     Smokeless Tobacco Use: Never     Passive Exposure: Not on file   Financial Resource Strain: Medium Risk (5/20/2022)    Overall Financial Resource Strain (CARDIA)     Difficulty of Paying Living Expenses: Somewhat hard   Alcohol Use: Not At Risk (8/4/2020)    AUDIT-C     Frequency of Alcohol Consumption: Never     Average Number of Drinks: Patient refused     Frequency of Binge Drinking: Never   Transportation Needs: No Transportation Needs (5/20/2022)    PRAPARE - Transportation     Lack of Transportation (Medical): No     Lack of Transportation (Non-Medical): No   Physical Activity: Unknown (8/4/2020)    Exercise Vital Sign     Days of Exercise per Week: 2 days     Minutes of Exercise per Session: Not on file   Interpersonal Safety: Not At Risk (8/4/2020)    Humiliation, Afraid, Rape, and Kick questionnaire     Fear of Current or Ex-Partner: No     Emotionally Abused: No     Physically Abused: No     Sexually Abused: No   Stress: No Stress Concern Present (8/4/2020)    [place] [employer] - Occupational Stress Questionnaire     Feeling of Stress : Only a little   Social Connections: Socially Isolated (8/4/2020)    Social Connection and Isolation Panel [NHANES]     Frequency of Communication with Friends and Family: Once a week     Frequency of Social Gatherings with Friends and Family: Never     Attends Druze Services: Never     Active Member of Clubs or Organizations: No     Attends Club or Organization  Meetings: Never     Marital Status:        Functional Status:  Prior to admission patient needed assistance:   Dependent ADLs:: Independent  Dependent IADLs:: Independent  Assesssment of Functional Status: At functional baseline    Mental Health Status:  Mental Health Status: No Current Concerns       Chemical Dependency Status:  Chemical Dependency Status: No Current Concerns             Values/Beliefs:  Spiritual, Cultural Beliefs, Hoahaoism Practices, Values that affect care:   No              Additional Information:  Patient is a 52 year old male who is s/p ex lap with small bowel resection.  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  Pt is on TPN which is provided by [facility].  [facility] liaison updated on pt's admission.  Home infusion resumption orders in place.  RNCC will continue to follow and assist with discharge planning. Pt's spouse will provide a ride to home at discharge.      [facility])  Phone: [phone]  Fax: [phone]    GERRI Andrea  Phone: [phone]  Pager: [phone]    SEARCHABLE in [facility] - search CARE COORDINATOR     [place] ([id number]) Saturday & Sunday; ([phone]) [facility] Recognized Holidays     Units: 5A, 5B & 5C  Pager: [phone]    Units: 6B, 6C & 6D    Pager: [phone]    Units: 7A, 7B, 7C & 7D    Pager: [phone]    Units: 6A & ICU   Pager: [phone]    Units: 5 Ortho, 5MS & WB ED Pager: [phone]    Units: 6MS, 8A & 10 ICU  Pager [phone]

## 2023-10-02 NOTE — PLAN OF CARE
Goal Outcome Evaluation:      Plan of Care Reviewed With: patient          Outcome Evaluation: Anticipate discharge to home with resumption of home infusion    GERRI Andrea  Phone: [phone]  Pager: [phone]    SEARCHABLE in [facility] - search CARE COORDINATOR     [place] ([id number]) Saturday & Sunday; ([phone]) [facility] Recognized Holidays     Units: 5A, 5B & 5C  Pager: [phone]    Units: 6B, 6C & 6D    Pager: [phone]    Units: 7A, 7B, 7C & 7D    Pager: [phone]    Units: 6A & ICU   Pager: [phone]    Units: 5 Ortho, 5MS & WB ED Pager: [phone]    Units: 6MS, 8A & 10 ICU  Pager [phone]

## 2023-10-02 NOTE — PROGRESS NOTES
[facility]    Progress Note - MIS/Bariatric Surgery Service       Date of Admission:  9/30/2023  Procedure(s):  Laparotomy exploratory, reduction of intussusception, resection of small bowel with primary anastamosis, upper endoscopy on * No dates entered *  Assessment & Plan: Surgery   52M with complex surgical history following Celestino-en-y bypass in 2002 who presents with 2 days of diffuse abdominal pain and nausea/vomiting coffee ground emesis. Hx of short gut syndrome. He has a new leukocytosis to 17.9, possibly febrile at OSH. CT with evidence of long-segment proximal jejunal intussusception causing SBO, no free air seen on scan. Patient is uncomfortable appearing but not peritonitic. He is now s/p ex lap with small bowel resection.     Passing gas, no BM yet. No nausea or vomiting.     Clear liquids today  Follow up labs  Home TPN  Nutrition consult to help with oral feeding (patient has adequate intestinal length)  Concern for opioid withdrawal. Restarting home pain regimen.   Remove Sanchez's catheter  Ambulate QID  IV Protonix BID - will change to oral tomorrow  Pain control: tylenol, robaxin, gabapentin, fentanyl patch, oxycodone  G tube to gravity.  Trending labs  DVT ppx with lovenox 40 mg daily     The patient's care will be discussed with the Attending Physician, Dr. Mercado .    DAVE Castillo, FEBS ([facility])  General Surgery PGY-5  *[phone]      All communications related to this note should be expressed to resident/JACQUI on call for this team at the time of your communication.  ______________________________________________________________________    Interval History   Passing gas, no BM yet. No nausea or vomiting. Ambulating well.     Physical Exam   Vital Signs: Temp: 99.9  F (37.7  C) Temp src: Oral BP: 119/65 Pulse: 87   Resp: 18 SpO2: 95 % O2 Device: None (Room air)    Weight: 0 lbs 0 oz      Gen: alert   Chest: breathing non-labored  Abdomen:  "soft, appropriately tender, non-distended    Incision: clean, intact  Extremities: warm and well perfused       Intake/Output Summary (Last 24 hours) at 10/2/2023 0712  Last data filed at 10/2/2023 0451  Gross per 24 hour   Intake --   Output 1025 ml   Net -1025 ml   Drains: PRESENT         - 1 Biliary Drain(s)             Data   Recent Labs   Lab 10/02/23  0024 10/01/23  0526 09/30/23  1601 09/30/23  0707 09/29/23  2111 09/29/23 2057   WBC  --  21.5*  --   --   --  17.9*   HGB  --  10.4*  --   --   --  12.2*   MCV  --  90  --   --   --  85   PLT  --  156  --   --   --  212   INR  --   --   --   --  1.10  --    NA  --  140  --  139  --  137   POTASSIUM  --  3.9  --  3.9  --  3.9   CHLORIDE  --  106  --  103  --  102   CO2  --  24  --  24  --  23   BUN  --  20.4*  --  22.1*  --  22.1*   CR  --  0.76 0.69 0.74  --  0.72   ANIONGAP  --  10  --  12  --  12   SILAS  --  7.8*  --  8.4*  --  8.5*   * 155*  --  169*  --  135*   ALBUMIN  --   --   --   --   --  3.6   PROTTOTAL  --   --   --   --   --  6.6   BILITOTAL  --   --   --   --   --  1.0   ALKPHOS  --   --   --   --   --  83   ALT  --   --   --   --   --  13   AST  --   --   --   --   --  15   LIPASE  --   --   --   --   --  11*       Clinically Significant Risk Factors            # Hypomagnesemia: Lowest Mg = 1.6 mg/dL in last 2 days, will replace as needed              # Overweight: Estimated body mass index is 27.2 kg/m  as calculated from the following:    Height as of 9/29/23: 1.803 m (5' 11\").    Weight as of 9/29/23: 88.5 kg (195 lb)., PRESENT ON ADMISSION            Disposition Plan     Expected Discharge Date: 10/04/2023                   "

## 2023-10-02 NOTE — PLAN OF CARE
Patient refusing marina removal. Patient stated he spoke with M[facility] and made a plan to remove Marina Catheter in the evening before bed on 10/2/23. Patient educated by this RN regarding CAUTI and reason for removal. Patient stated that he would like to keep in until he went to bed. This RN notified 7B Charge RN and 7B PCS.

## 2023-10-02 NOTE — PLAN OF CARE
Goal Outcome Evaluation:      Plan of Care Reviewed With: patient    Overall Patient Progress: no change     Neuro:A/O x4   Respiratory: on 3L NC sats mid 90s  Cardiac: WDL. Denies chest pain  Diet:npo except meds and ice chips  GI/: marina in place. Voiding adequately. No bm this shift. G tube in place and clamped  Incision/Drains: abdominal incision covered with primapore.   IV Access: DL CVC in place.Pt on cycled tube feeds  Pain: given PRN oxy x 3, PRN robaxin x 1 and prn compazine x1 for nausea and pain  Labs: reviewed   VS:Temp: 99.9  F (37.7  C) Temp src: Oral BP: 119/65 Pulse: 87   Resp: 18 SpO2: 95 % O2 Device: None (Room air) Oxygen Delivery: 3 LPM   Activity: SBA     New Changes this shift:none  Plan: continue POC

## 2023-10-02 NOTE — PLAN OF CARE
Goal Outcome Evaluation:      Plan of Care Reviewed With: patient    Overall Patient Progress: improvingOverall Patient Progress: improving    Outcome Evaluation: Pt A&O x4. VSS stable on room air. He reports 5/10 pain that is managed with PRN 5-10mg of Oxy. Refuses liquid tylenol because it burns his throat. At the time of writing this note, he has ambulated x4 in the halls today. Mg and P were replaced per MAR. Sanchez in place.    Temp: 99.1  F (37.3  C) Temp src: Oral BP: 122/74 Pulse: 81   Resp: 18 SpO2: 93 % O2 Device: None (Room air)

## 2023-10-02 NOTE — PROGRESS NOTES
CLINICAL NUTRITION SERVICES - BRIEF NOTE     Nutrition Prescription    Recommendations already ordered by Registered Dietitian (RD):  Discussed with pharmacist and sent change order request (decreasing dex in TPN out of an abundance of caution tonight due to drop in phos):    Decrease dextrose in TPN tonight to 100 g, continue 12 hr cycle    Dextrose titration: Monitor lytes and if within acceptable parameters (Mg++ > or = 1.5, K+ is > or = 3, and PO4 > or = 1.9), increase dextrose by 25-75 g/day (per RD/PharmD discretion) back to goal of 175 g dextrose.    Future/Additional Recommendations:  Monitor lytes and ability to advance back towards home goal dextrose     EVALUATION OF THE PROGRESS TOWARD GOALS   Diet: Clear Liquid    Nutrition Support: Home TPN resumed last night: 1000 mL x 12 hrs with 175 g dextrose, 100 g AA, and 250 mL 20% lipids 3 days per week for total provision of 1209 Kcals (23 Kcals/kg), 100 g/kg protein, GIR 3 mg/kg/minute, and 18% fat kcals on average daily     Intake: Per RD note yesterday, pt reported last running TPN ~3 days prior.       NEW FINDINGS   Labs  - K+ WNL  - Mg++ 2 (WNL), replacement ordered  - Phos 1.6 (L), replacement ordered       INTERVENTIONS  See above    Monitoring/Evaluation  Progress toward goals will be monitored and evaluated per protocol.      Steph Tijerina RD, , LD  Weekday Pager: [phone]  Weekday Units covered: 5A (1882-0665) and 7B ([id number])  Weekend/Holiday RD Pager: [phone]

## 2023-10-03 ENCOUNTER — APPOINTMENT (OUTPATIENT)
Dept: OCCUPATIONAL THERAPY | Facility: CLINIC | Age: 53
DRG: 329 | End: 2023-10-03
Payer: COMMERCIAL

## 2023-10-03 ENCOUNTER — MYC REFILL (OUTPATIENT)
Dept: INTERNAL MEDICINE | Facility: CLINIC | Age: 53
End: 2023-10-03
Payer: COMMERCIAL

## 2023-10-03 VITALS
OXYGEN SATURATION: 93 % | TEMPERATURE: 97.9 F | RESPIRATION RATE: 16 BRPM | SYSTOLIC BLOOD PRESSURE: 111 MMHG | HEART RATE: 94 BPM | DIASTOLIC BLOOD PRESSURE: 64 MMHG

## 2023-10-03 DIAGNOSIS — F90.0 ADHD (ATTENTION DEFICIT HYPERACTIVITY DISORDER), INATTENTIVE TYPE: ICD-10-CM

## 2023-10-03 LAB
ANION GAP SERPL CALCULATED.3IONS-SCNC: 9 MMOL/L (ref 7–15)
BUN SERPL-MCNC: 16.8 MG/DL (ref 6–20)
CALCIUM SERPL-MCNC: 8.4 MG/DL (ref 8.6–10)
CHLORIDE SERPL-SCNC: 101 MMOL/L (ref 98–107)
CREAT SERPL-MCNC: 0.6 MG/DL (ref 0.67–1.17)
DEPRECATED HCO3 PLAS-SCNC: 25 MMOL/L (ref 22–29)
EGFRCR SERPLBLD CKD-EPI 2021: >90 ML/MIN/1.73M2
GLUCOSE BLDC GLUCOMTR-MCNC: 107 MG/DL (ref 70–99)
GLUCOSE BLDC GLUCOMTR-MCNC: 118 MG/DL (ref 70–99)
GLUCOSE BLDC GLUCOMTR-MCNC: 144 MG/DL (ref 70–99)
GLUCOSE BLDC GLUCOMTR-MCNC: 98 MG/DL (ref 70–99)
GLUCOSE SERPL-MCNC: 109 MG/DL (ref 70–99)
MAGNESIUM SERPL-MCNC: 1.9 MG/DL (ref 1.7–2.3)
PATH REPORT.COMMENTS IMP SPEC: NORMAL
PATH REPORT.COMMENTS IMP SPEC: NORMAL
PATH REPORT.FINAL DX SPEC: NORMAL
PATH REPORT.GROSS SPEC: NORMAL
PATH REPORT.MICROSCOPIC SPEC OTHER STN: NORMAL
PATH REPORT.RELEVANT HX SPEC: NORMAL
PHOSPHATE SERPL-MCNC: 3.3 MG/DL (ref 2.5–4.5)
PHOTO IMAGE: NORMAL
PLATELET # BLD AUTO: 139 10E3/UL (ref 150–450)
POTASSIUM SERPL-SCNC: 4 MMOL/L (ref 3.4–5.3)
SODIUM SERPL-SCNC: 135 MMOL/L (ref 135–145)

## 2023-10-03 PROCEDURE — 83735 ASSAY OF MAGNESIUM: CPT | Performed by: STUDENT IN AN ORGANIZED HEALTH CARE EDUCATION/TRAINING PROGRAM

## 2023-10-03 PROCEDURE — 97530 THERAPEUTIC ACTIVITIES: CPT | Mod: GO

## 2023-10-03 PROCEDURE — 80048 BASIC METABOLIC PNL TOTAL CA: CPT | Performed by: STUDENT IN AN ORGANIZED HEALTH CARE EDUCATION/TRAINING PROGRAM

## 2023-10-03 PROCEDURE — 88304 TISSUE EXAM BY PATHOLOGIST: CPT | Mod: 26 | Performed by: PATHOLOGY

## 2023-10-03 PROCEDURE — 250N000011 HC RX IP 250 OP 636: Mod: JZ | Performed by: STUDENT IN AN ORGANIZED HEALTH CARE EDUCATION/TRAINING PROGRAM

## 2023-10-03 PROCEDURE — 84100 ASSAY OF PHOSPHORUS: CPT | Performed by: STUDENT IN AN ORGANIZED HEALTH CARE EDUCATION/TRAINING PROGRAM

## 2023-10-03 PROCEDURE — 88307 TISSUE EXAM BY PATHOLOGIST: CPT | Mod: 26 | Performed by: PATHOLOGY

## 2023-10-03 PROCEDURE — 97535 SELF CARE MNGMENT TRAINING: CPT | Mod: GO

## 2023-10-03 PROCEDURE — 85049 AUTOMATED PLATELET COUNT: CPT | Performed by: STUDENT IN AN ORGANIZED HEALTH CARE EDUCATION/TRAINING PROGRAM

## 2023-10-03 PROCEDURE — 250N000013 HC RX MED GY IP 250 OP 250 PS 637: Performed by: STUDENT IN AN ORGANIZED HEALTH CARE EDUCATION/TRAINING PROGRAM

## 2023-10-03 PROCEDURE — 36415 COLL VENOUS BLD VENIPUNCTURE: CPT | Performed by: STUDENT IN AN ORGANIZED HEALTH CARE EDUCATION/TRAINING PROGRAM

## 2023-10-03 RX ORDER — PANTOPRAZOLE SODIUM 40 MG/1
40 TABLET, DELAYED RELEASE ORAL
Status: DISCONTINUED | OUTPATIENT
Start: 2023-10-03 | End: 2023-10-03 | Stop reason: HOSPADM

## 2023-10-03 RX ORDER — ENOXAPARIN SODIUM 100 MG/ML
40 INJECTION SUBCUTANEOUS ONCE
Status: COMPLETED | OUTPATIENT
Start: 2023-10-03 | End: 2023-10-03

## 2023-10-03 RX ORDER — ONDANSETRON 4 MG/1
4 TABLET, ORALLY DISINTEGRATING ORAL EVERY 8 HOURS PRN
Qty: 12 TABLET | Refills: 0 | Status: ON HOLD | OUTPATIENT
Start: 2023-10-03 | End: 2023-11-14

## 2023-10-03 RX ORDER — ENOXAPARIN SODIUM 100 MG/ML
80 INJECTION SUBCUTANEOUS EVERY 12 HOURS
Status: DISCONTINUED | OUTPATIENT
Start: 2023-10-03 | End: 2023-10-03 | Stop reason: HOSPADM

## 2023-10-03 RX ORDER — OXYCODONE HCL 5 MG/5 ML
5 SOLUTION, ORAL ORAL EVERY 6 HOURS PRN
Qty: 30 ML | Refills: 0 | Status: ON HOLD | OUTPATIENT
Start: 2023-10-03 | End: 2023-11-14

## 2023-10-03 RX ADMIN — SENNOSIDES AND DOCUSATE SODIUM 1 TABLET: 50; 8.6 TABLET ORAL at 08:00

## 2023-10-03 RX ADMIN — OXYCODONE HYDROCHLORIDE 10 MG: 5 SOLUTION ORAL at 02:08

## 2023-10-03 RX ADMIN — PROCHLORPERAZINE MALEATE 10 MG: 5 TABLET ORAL at 06:06

## 2023-10-03 RX ADMIN — ENOXAPARIN SODIUM 40 MG: 40 INJECTION SUBCUTANEOUS at 08:12

## 2023-10-03 RX ADMIN — ONDANSETRON 4 MG: 2 INJECTION INTRAMUSCULAR; INTRAVENOUS at 02:08

## 2023-10-03 RX ADMIN — OXYCODONE HYDROCHLORIDE 10 MG: 5 SOLUTION ORAL at 10:42

## 2023-10-03 RX ADMIN — ONDANSETRON 4 MG: 4 TABLET, ORALLY DISINTEGRATING ORAL at 10:41

## 2023-10-03 RX ADMIN — CARVEDILOL 12.5 MG: 12.5 TABLET, FILM COATED ORAL at 08:01

## 2023-10-03 RX ADMIN — GABAPENTIN 100 MG: 100 CAPSULE ORAL at 08:00

## 2023-10-03 RX ADMIN — OXYCODONE HYDROCHLORIDE 10 MG: 5 SOLUTION ORAL at 06:07

## 2023-10-03 RX ADMIN — PANTOPRAZOLE SODIUM 40 MG: 40 TABLET, DELAYED RELEASE ORAL at 09:44

## 2023-10-03 RX ADMIN — DEXTROAMPHETAMINE SACCHARATE, AMPHETAMINE ASPARTATE, DEXTROAMPHETAMINE SULFATE AND AMPHETAMINE SULFATE 20 MG: 1.25; 1.25; 1.25; 1.25 TABLET ORAL at 08:01

## 2023-10-03 ASSESSMENT — ACTIVITIES OF DAILY LIVING (ADL)
ADLS_ACUITY_SCORE: 27

## 2023-10-03 NOTE — PROGRESS NOTES
I saw and evaluated the patient. I agree with the findings and the plan of care as documented in the resident s note.    Delvis Mercado MD     [facility]     Progress Note - MIS/Bariatric Surgery Service       Date of Admission:  9/30/2023  Procedure(s):  Laparotomy exploratory, reduction of intussusception, resection of small bowel with primary anastamosis, upper endoscopy on * No dates entered *  Assessment & Plan: Surgery   52M with complex surgical history following Celestino-en-y bypass in 2002 who presents with 2 days of diffuse abdominal pain and nausea/vomiting coffee ground emesis. Hx of short gut syndrome. He has a new leukocytosis to 17.9, possibly febrile at OSH. CT with evidence of long-segment proximal jejunal intussusception causing SBO, no free air seen on scan.      He is now s/p ex lap with small bowel resection on 9/30/2023. Passing gas, one small BM, appropriate urine output. No nausea or vomiting.      Regular diet  TPN resumed  Concern for opioid withdrawal. Restarting home pain regimen.   Ambulate QID  Oral Protonix BID   Pain control: tylenol, robaxin, gabapentin, fentanyl patch, oxycodone  G tube to gravity PRN  Resume lovenox 80 mg BID  Discharge today     The patient's care will be discussed with the Attending Physician, Dr. Mercado .     Note initially prepared by Armando Cota MS3    Resident/Fellow Attestation   I, Leighton Duenas MD, was present with the medical student who participated in the service and in the documentation of the note.  I have verified the history and personally performed the physical exam and medical decision making.  I agree with the assessment and plan of care as documented in the note.      DAVE Castillo, FEBS ([facility])  General Surgery PGY-5  *[phone]       All communications related to this note should be expressed to resident/JACQUI on call for this team at the time of your  "communication.  ______________________________________________________________________        Interval History  Patient feels pain is improving. Passing gas, one small BM. No nausea or vomiting. Ambulating well.            Physical Exam  Vital signs:  Temp: 98.4  F (36.9  C) Temp src: Oral BP: 107/62 Pulse: 80   Resp: 18 SpO2: 90 % O2 Device: None (Room air) Oxygen Delivery: 3 LPM      Estimated body mass index is 27.2 kg/m  as calculated from the following:    Height as of 9/29/23: 1.803 m (5' 11\").    Weight as of 9/29/23: 88.5 kg (195 lb).    Chest: breathing non-labored  Abdomen: soft, appropriately tender, non-distended    Incision: clean, intact  Extremities: warm and well perfused      Intake/Output Summary (Last 24 hours) at 10/3/2023 0716  Last data filed at 10/3/2023 0500  Gross per 24 hour   Intake 418 ml   Output 2375 ml   Net -1957 ml        Drains: PRESENT         - 1 Biliary Drain(s)        Data     Recent Labs:   Latest Reference Range & Units 10/03/23 05:56   Sodium 135 - 145 mmol/L 135   Potassium 3.4 - 5.3 mmol/L 4.0   Chloride 98 - 107 mmol/L 101   Carbon Dioxide (CO2) 22 - 29 mmol/L 25   Urea Nitrogen 6.0 - 20.0 mg/dL 16.8   Creatinine 0.67 - 1.17 mg/dL 0.60 (L)   GFR Estimate >60 mL/min/1.73m2 >90   Calcium 8.6 - 10.0 mg/dL 8.4 (L)   Anion Gap 7 - 15 mmol/L 9   Magnesium 1.7 - 2.3 mg/dL 1.9   Phosphorus 2.5 - 4.5 mg/dL 3.3   (L): Data is abnormally low        Clinically Significant Risk Factors             # Hypomagnesemia: Lowest Mg = 1.6 mg/dL in last 2 days, will replace as needed              # Overweight: Estimated body mass index is 27.2 kg/m  as calculated from the following:    Height as of 9/29/23: 1.803 m (5' 11\").    Weight as of 9/29/23: 88.5 kg (195 lb)., PRESENT ON ADMISSION                Disposition Plan     Expected Discharge Date: 10/04/2023             "

## 2023-10-03 NOTE — DISCHARGE SUMMARY
[facility]  Discharge Summary  MIS Surgery     Parker Acevedo MRN# [id number]   YOB: 1970 Age: 52 year old     Date of Admission:  9/30/2023  Date of Discharge::  10/3/2023  1:51 PM  Admitting Physician:  Delvis Mercado MD  Discharge Physician:    Primary Care Physician:        Chuy Isaac          Admission Diagnoses:   Jejunal intussusception (H) [K56.1]  Opoid independence          Discharge Diagnosis:     Jejunal intussusception (H) [K56.1]  Opoid independence       Procedures:   Exploratory laparotomy with reduction of small bowel intussusception and bowel resection with primary anastomosis 9/30/2023          Non-operative procedures:   None performed          Consultations:   OCCUPATIONAL THERAPY ADULT IP CONSULT  PHYSICAL THERAPY ADULT IP CONSULT  CARE MANAGEMENT / SOCIAL WORK IP CONSULT           Imaging Studies:     Results for orders placed or performed during the hospital encounter of 09/29/23   CT Abdomen Pelvis w Contrast    Narrative    EXAM: CT ABDOMEN PELVIS W CONTRAST  LOCATION: [facility]  DATE: 9/29/2023    INDICATION: upper abd pain, vomiting, increased g tube output  COMPARISON: None.  TECHNIQUE: CT scan of the abdomen and pelvis was performed following injection of IV contrast. Multiplanar reformats were obtained. Dose reduction techniques were used.  CONTRAST: Isovue 370, 70mL    FINDINGS:   LOWER CHEST: Normal.    HEPATOBILIARY: Cholecystectomy clips, and mild biliary dilatation likely due to postcholecystectomy state. Liver parenchyma is unremarkable.    PANCREAS: Normal.    SPLEEN: Normal.    ADRENAL GLANDS: Normal.    KIDNEYS/BLADDER: Normal.    BOWEL: There is a long segment intussusception in the jejunum in the left upper quadrant, with associated thickened edematous small bowel. The proximal jejunum and duodenum is dilated. Distal small bowel and colon are unremarkable. No pneumatosis, free   gas or free fluid.  Postsurgical changes of the stomach. Percutaneous gastrostomy tube. Moderate hiatal hernia, and moderate fluid distention of the herniated stomach.    LYMPH NODES: Normal.    VASCULATURE: Unremarkable.    PELVIC ORGANS: Normal.    MUSCULOSKELETAL: Few tiny nodular soft tissue densities and foci of subcutaneous gas in the anterior abdominal wall likely injection related. No acute osseous abnormality.      Impression    IMPRESSION:   1.  Small bowel obstruction due to long segment intussusception of the proximal jejunum in the left upper quadrant. Thickened, edematous small bowel around the intussusception.       Findings discussed with Dr. Delgado 09/29/2023 11:52 PM     *Note: Due to a large number of results and/or encounters for the requested time period, some results have not been displayed. A complete set of results can be found in Results Review.              Medications Prior to Admission:     No medications prior to admission.              Discharge Medications:     Discharge Medication List as of 10/3/2023  1:16 PM        START taking these medications    Details   acetaminophen (TYLENOL) 325 MG/10.15ML solution Take 30.45 mLs (975 mg) by mouth every 8 hours for 14 days, Disp-1278.9 mL, R-0, E-Prescribe      gabapentin (NEURONTIN) 100 MG capsule Take 1 capsule (100 mg) by mouth 3 times daily for 7 days, Disp-21 capsule, R-0, E-Prescribe      lipids plant base (CLINOLIPID) 20 % infusion Inject 250 mLs into the vein every 24 hours, Disp-1000 mL, R-3, E-Prescribe      methocarbamol (ROBAXIN) 750 MG tablet Take 1 tablet (750 mg) by mouth every 6 hours as needed for muscle spasms, Disp-30 tablet, R-0, E-Prescribe      !! naloxone (NARCAN) 4 MG/0.1ML nasal spray Spray 1 spray (4 mg) into one nostril alternating nostrils as needed for opioid reversal every 2-3 minutes until assistance arrives, Disp-0.2 mL, R-3, E-Prescribe      !! ondansetron (ZOFRAN ODT) 4 MG ODT tab Take 1 tablet (4 mg) by mouth every 8 hours as  needed for nausea, Disp-12 tablet, R-0, E-Prescribe      senna-docusate (SENOKOT-S/PERICOLACE) 8.6-50 MG tablet Take 1 tablet by mouth 2 times daily for 30 days, Disp-60 tablet, R-0, E-Prescribe       !! - Potential duplicate medications found. Please discuss with provider.        CONTINUE these medications which have CHANGED    Details   !! oxyCODONE (ROXICODONE) 5 MG/5ML solution Take 5 mLs (5 mg) by mouth every 6 hours as needed for moderate pain or severe pain (5 mg for moderate pain; 10 mg for severe pain), Disp-30 mL, R-0, Local Print       !! - Potential duplicate medications found. Please discuss with provider.        CONTINUE these medications which have NOT CHANGED    Details   albuterol (VENTOLIN HFA) 108 (90 Base) MCG/ACT inhaler Inhale 2 puffs into the lungs every 6 hours as needed, Disp-18 g, R-1, E-PrescribePharmacy may dispense brand covered by insurance (Proair, or proventil or ventolin or generic albuterol inhaler)      ALPRAZolam (XANAX) 0.5 MG tablet TAKE 1 TABLET (0.5 MG) BY MOUTH 2 TIMES DAILY AS NEEDED FOR SLEEP OR ANXIETY, Disp-60 tablet, R-0, E-Prescribe      amphetamine-dextroamphetamine (ADDERALL) 20 MG tablet TAKE ONE TABLET BY MOUTH ONCE DAILY, Disp-30 tablet, R-0, E-Prescribe      bisacodyl (DULCOLAX) 5 MG EC tablet Two days prior to exam take two (2) tablets at 4pm. One day prior to exam take two (2) tablets at 4pm, Disp-4 tablet, R-0, E-Prescribe      carvedilol (COREG) 6.25 MG tablet TAKE 2 TABLETS (12.5 MG) BY MOUTH 2 TIMES DAILY WITH MEALS, Disp-60 tablet, R-3, E-Prescribe      cyanocobalamin (CYANOCOBALAMIN) 1000 MCG/ML injection INJECT 1 ML INTO THE MUSCLE EVERY 30 DAYS, Disp-1 mL, R-3, E-Prescribe      enoxaparin ANTICOAGULANT (LOVENOX) 80 MG/0.8ML syringe INJECT THE CONTENTS OF ONE SYRINGE (80MG) UNDER THE SKIN TWO TIMES A DAY, Disp-67.2 mL, R-0, E-Prescribe      [facility] MANAGED PATIENT Contact [facility] for patient specific medication information at  "[phone] on admission and discharge from the hospital.  Phones are answered 24 hours a day 7 days a week 365 days a year.    Providers - Choose \"CONTINUE HOME MED (no scr ipt)\" at discharge if patient treatment with home infusion will continue., No Print OutResume prior to admission TPN/Lipids/saline      fentaNYL (DURAGESIC) 25 mcg/hr 72 hr patch Place 1 patch onto the skin every 48 hours Fill 09/28/23 and start 09/30/23. 30 day supply for chronic pain., Disp-15 patch, R-0, E-Prescribe      !! naloxone (NARCAN) 4 MG/0.1ML nasal spray Spray 1 spray (4 mg) into one nostril alternating nostrils once as needed for opioid reversal every 2-3 minutes until assistance arrives, Disp-0.2 mL, R-0, E-Prescribe      nystatin (MYCOSTATIN) 319160 UNIT/GM external cream APPLY TOPICALLY 2 TIMES DAILYDisp-30 g, [id number]      !! ondansetron (ZOFRAN ODT) 8 MG ODT tab DISSOLVE ONE TABLET ON TONGUE EVERY 8 HOURS AS NEEDED FOR NAUSEA, Disp-90 tablet, R-1, E-Prescribe      ondansetron (ZOFRAN) 4 MG tablet Take one tablet every six hours for nausea during colonoscopy bowel prepping, Disp-3 tablet, R-0, E-Prescribe      !! oxyCODONE (ROXICODONE) 5 MG/5ML solution Take 5-10 mLs (5-10 mg) by mouth every 4 hours as needed for moderate to severe pain Max of 40mg/day. Fill 09/28/23 and start 09/30/23. 30 day supply for chronic pain., Disp-1200 mL, R-0, E-Prescribe      pantoprazole (PROTONIX) 40 MG EC tablet TAKE 1 TABLET (40 MG) BY MOUTH DAILY, Disp-30 tablet, R-5, E-Prescribe      polyethylene glycol (GOLYTELY) 236 g suspension Take as directed. Two days before your exam fill the first container with water. Cover and shake until mixed well. At 5:00pm drink one 8oz glass every 10-15 minutes until half (1/2) of the first container is empty. Store the remainder in the refrigerator . One day before your exam at 5:00pm drink the second half of the first container until it is gone. Before you go to bed mix the second container with water " "and put in refrigerator. Six hours before your check in time drink one 8oz glass every 10-15 alvin katia until half of container is empty. Discard the remainder of solution., Disp-8000 mL, R-0, E-PrescribePharmacy may substitute for equivalent. Not a duplicate. Needs two containers for extended bowel prep      polyethylene glycol (MIRALAX) 17 GM/Dose powder Take 17 g by mouth daily, Disp-1020 g, R-3, E-Prescribe      sucralfate (CARAFATE) 1 GM/10ML suspension TAKE 10MLS  BY MOUTH FOUR TIMES A DAY AS NEEDED, Disp-420 mL, R-1, E-Prescribe      Syringe/Needle, Disp, (B-D ECLIPSE SYRINGE) 27G X 1/2\" 1 ML MISC 1 Device every 30 days, Disp-30 each, R-1, E-Prescribe      vitamin D2 (ERGOCALCIFEROL) 58495 units (1250 mcg) capsule Take 1 capsule (50,000 Units) by mouth once a week, Disp-12 capsule, R-3, E-Prescribe       !! - Potential duplicate medications found. Please discuss with provider.        STOP taking these medications       sodium chloride 4.64 mL with gentamicin 12.4 mg, heparin (porcine) 50 Units for Dialysis Catheter Care Comments:   Reason for Stopping:         sodium chloride 4.64 mL with gentamicin 12.4 mg, heparin (porcine) 50 Units for Dialysis Catheter Care Comments:   Reason for Stopping:                       Medications Discontinued or Adjusted During This Hospitalization:   No change           Antibiotics Prescribed at Discharge:   None prescribed              Brief History of Illness:   52M with complex surgical history following Celestino-en-y bypass in 2002 who presents with 2 days of diffuse abdominal pain and nausea/vomiting coffee ground emesis. Hx of short gut syndrome. He has a new leukocytosis to 17.9, possibly febrile at OSH. CT with evidence of long-segment proximal jejunal intussusception causing SBO, no free air seen on scan.               Hospital Course:   Patient admitted, resuscitated and exploratory laparotomy was done on 9/30/2023. Her recovered well post operatively and he started " tolerating oral diet . His pain was controlled with combination of oral analgesics and PTA fentanyl patch.            Day of Discharge Physical Exam:   Gen: AAOx3, NAD  Pulm: Non-labored breathing  Abd: Soft, appropriately tender, no guarding   Incision C/D/I with staples in place     Ext:  Warm and well-perfused         Final Pathology Result:   A. Abdominal scar, excision:  - Skin with dermal scarring  - Negative for dysplasia or malignancy     B. Segment of small bowel, segmentectomy:  - Small bowel with congestion and edema, consistent with intussusception  - Margins feature viable tissue  - Negative for malignancy           Discharge Instructions and Follow-Up:     Discharge diet: Regular   Discharge activity: Activity as tolerated   Discharge follow-up: Follow up with Dr. Mercado in 2 weeks   Tubes/Lines/Drains: Pre existing gastrostomy tube - pre existing tunneled central line   Wound care: Keep wound clean and dry      Discharge Procedure Orders   Home Infusion Referral   Referral Priority: Routine: Next available opening Referral Type: Consultation   Number of Visits Requested: 1     Reason for your hospital stay   Order Comments: Small bowel intussusception     Activity   Order Comments: Your activity upon discharge: activity as tolerated     Order Specific Question Answer Comments   Is discharge order? Yes      Discharge Instructions   Order Comments: DIET  - advance diet as tolerated.  -We recommend eating slowly, chewing thoroughly, eating small frequent meals throughout the day  -Stay well hydrated.      ACTIVITY  -No lifting, pushing, pulling greater than 10 lbs and no strenuous exercise for 6 weeks   -No driving while on narcotic analgesics (i.e. Percocet, oxycodone, Vicodin)  -No driving until you are able to fully twist to both sides or slam on brakes quickly and without any pain    WOUND CARE  -Inspect your wounds daily for signs of infection (increased redness, drainage, pain)  -Keep your  wound clean and dry  -You may shower, but do not soak in tub or pool      MEDICATIONS  - Do not take any additional Tylenol (acetaminophen) while using a narcotic pain medication which includes acetaminophen  - Do not take more than 4,000mg of acetaminophen in any 24 hour period, as this can cause liver damage  - Take stool softeners such as Senna or Miralax while you are using narcotics, but stop if you develop diarrhea  - Wean yourself off of narcotic pain medications    NOTIFY  Please contact us for problems after discharge such as:  -Temperature > 101F, chills, rigors, dizziness  -Redness around or purulent drainage from wound  -Inability to tolerate diet, nausea or vomiting  -You stop passing gas, develop significant bloating, abdominal pain  -Have blood in stools/vomit  -Have severe diarrhea/constipation  -Any other questions or concerns.  - At nights (after 4:30pm), on weekends, or if urgent, call [phone] and ask the  to speak with the on-call Surgery resident    FOLLOW-UP:  - Call your primary care provider to touch base regarding your recent admission.    - Call or return sooner than your regularly scheduled visit if you develop any of the following: fever >101.5, uncontrolled pain, uncontrolled nausea or vomiting, as well as increased redness, swelling, or drainage from your wound.   -  A nurse from the General Surgery Clinic will contact you within 24 hours, or the next business day, after your discharge from the hospital.  If you have questions or to schedule a follow up appointment please call the General Surgery Clinic at [phone].  Call [phone] and ask to speak with the Surgery resident on call if you are having troubles in the evenings, at night, or on the weekend.  -  for Dr Mercado clinic  please call [phone] to schedule your follow up appointment or with any questions or concerns.  -  If you are receiving home care please inform your home care nurse of our contact  number.     Adult [facility] Follow-up and recommended labs and tests   Order Comments: Follow up with Dr. Mercado after 12 days for post surgery check and removal of skin staples  Appointments on [place] and/or [facility] (with [facility] or [facility] provider or service). Call [phone] if you haven't heard regarding these appointments within 7 days of discharge.     Diet   Order Comments: Follow this diet upon discharge: Advance to a regular diet as tolerated     Order Specific Question Answer Comments   Is discharge order? Yes             Home Health Care:     Already following           Discharge Disposition:     Discharged to home      Condition at discharge: Stable    MARLON CastilloBS, FEBS (EmSurg)  General Surgery PGY-5  *[phone]

## 2023-10-03 NOTE — PLAN OF CARE
Goal Outcome Evaluation:      Plan of Care Reviewed With: patient, significant other          Outcome Evaluation: Pt is alert and oriented x4 and vitally stable. Pt has met discharge criteria and has discharged to home with his significant other. AVS and discharge medications were delivered to patient prior to going home. All discharge education regarding home infusion were explained to pt and his significant other prior to discharge. Blood pressure 111/64, pulse 94, temperature 97.9  F (36.6  C), temperature source Oral, resp. rate 16, SpO2 93 %.

## 2023-10-03 NOTE — PROGRESS NOTES
Care Management Discharge Note    Discharge Date: 10/03/2023       Discharge Disposition: Home, Home Infusion, Home Care    Discharge Services: None    Discharge DME: None    Discharge Transportation: family or friend will provide    Private pay costs discussed: Not applicable    Does the patient's insurance plan have a 3 day qualifying hospital stay waiver?  No    PAS Confirmation Code:  NA  Patient/family educated on Medicare website which has current facility and service quality ratings:  NA    Education Provided on the Discharge Plan:  yes  Persons Notified of Discharge Plans: patient and care team   Patient/Family in Agreement with the Plan:      Handoff Referral Completed: Yes    Additional Information:    Internal referral sent.  Wife is providing transportation at discharge.  Lists of hospitals in the [place] notified.     Ashley Bowen RN, PHN, BSN   Float Nurse Care Coordinator  Covering for [address]  Phone [phone]

## 2023-10-03 NOTE — PLAN OF CARE
Physical Therapy Discharge Summary    Reason for therapy discharge:    Discharged to home with home therapy.    Progress towards therapy goal(s). See goals on Care Plan in [facility] electronic health record for goal details.  Goals partially met.  Barriers to achieving goals:   discharge from facility.    Therapy recommendation(s):    Continued therapy is recommended.  Rationale/Recommendations:  Recommend HHPT to assess pt safety within home, progress functional strength, and increase independence and safety with mobility for return to PLOF.

## 2023-10-03 NOTE — PROGRESS NOTES
TPN cycle 3/3 interrupted because pt disconnected his IV and went for a walk. He is set to discontinue according to provided. Will not restart TPN due to his frequent activities outdoor.

## 2023-10-03 NOTE — PLAN OF CARE
Problem: Plan of Care - These are the overarching goals to be used throughout the patient stay.    Goal: Optimal Comfort and Wellbeing  Outcome: Not Progressing     Problem: Surgery Nonspecified  Goal: Effective Bowel Elimination  Outcome: Progressing   Goal Outcome Evaluation:    Temp: 98.4  F (36.9  C) Temp src: Oral BP: 107/62 Pulse: 80   Resp: 18 SpO2: 90 % O2 Device: None (Room air)         Time of care: 5195-4553   Reason for admission: POD #2-3 bowel resection    NEURO: A&O, but sometimes says inappropriate responses/reactions, rambles, does not always understand rationale for interventions/treatments. Forgetful, calls inconsistently. Mood labile, but redirectable.   RESPIRATORY: sats >90% on RA. Denies SOB. occasional cough.   CARDIAC: vss, denies cardiac chest pain  GI/: marina removed at 2215, voided 650ml at 0500. +flatus.   DIET: clears  PAIN/NAUSEA: using prn oxycodone q4h, refuses tylenol. Accepts heat. Using compazine and zofran for nausea and tums x1 for heartburn.   INCISION/DRAINS/SKIN: midline incision SRI w/ staples.   IV ACCESS: L chest port DL- red SL and purple w/ TPN/lipids. Initially occluded, but cleared w/ flushing.   ACTIVITY: SBA w/ walker   LAB: reviewed. Phos recheck   CHANGES: no acute major changes.     PLAN: continue w/ POC

## 2023-10-04 ENCOUNTER — PATIENT OUTREACH (OUTPATIENT)
Dept: CARE COORDINATION | Facility: CLINIC | Age: 53
End: 2023-10-04
Payer: COMMERCIAL

## 2023-10-04 ENCOUNTER — TELEPHONE (OUTPATIENT)
Dept: INTERNAL MEDICINE | Facility: CLINIC | Age: 53
End: 2023-10-04
Payer: COMMERCIAL

## 2023-10-04 ENCOUNTER — PATIENT OUTREACH (OUTPATIENT)
Dept: ENDOCRINOLOGY | Facility: CLINIC | Age: 53
End: 2023-10-04
Payer: COMMERCIAL

## 2023-10-04 DIAGNOSIS — Z09 HOSPITAL DISCHARGE FOLLOW-UP: ICD-10-CM

## 2023-10-04 DIAGNOSIS — F90.0 ADHD (ATTENTION DEFICIT HYPERACTIVITY DISORDER), INATTENTIVE TYPE: ICD-10-CM

## 2023-10-04 RX ORDER — DEXTROAMPHETAMINE SACCHARATE, AMPHETAMINE ASPARTATE, DEXTROAMPHETAMINE SULFATE AND AMPHETAMINE SULFATE 5; 5; 5; 5 MG/1; MG/1; MG/1; MG/1
TABLET ORAL
Qty: 30 TABLET | Refills: 0 | OUTPATIENT
Start: 2023-10-04

## 2023-10-04 RX ORDER — DEXTROAMPHETAMINE SACCHARATE, AMPHETAMINE ASPARTATE, DEXTROAMPHETAMINE SULFATE AND AMPHETAMINE SULFATE 5; 5; 5; 5 MG/1; MG/1; MG/1; MG/1
TABLET ORAL
Qty: 30 TABLET | Refills: 0 | Status: SHIPPED | OUTPATIENT
Start: 2023-10-04 | End: 2023-11-07

## 2023-10-04 NOTE — PROGRESS NOTES
RN Post-Op/Post-Discharge Care Coordination Note    Mr. Parker Acevedo is a 52 year old male who underwent  Laparotomy exploratory, reduction of intussusception, resection of small bowel with primary anastamosis, upper endoscopy  on 9/30 with  Dr. Delvis Mercado.  Spoke with Patient.    Support  Patient able to care for self independently     Health Status  Nausea/Vomiting: Patient denies nausea/vomiting.  Eating/drinking: Patient is able to eat and drink without any complaints.  Bowel habits: Patient reports no bowel movement since surgery. Is passing gasTaking stool softener  Drains (CHICA): N/A  Fevers/chills: Patient denies any fever or chills. Temp 100.8 last night. Today temp 100, Encouraged coughing and deep breathing and increased movemnt/walking.   Incisions: Patient denies any signs and symptoms of infection..  Wound closure:  Staples  Pain: 7  New Medications:  oxycodone and tylenol    Activity/Restrictions  No lifting in excess of 15-20 pounds for 3-4 weeks    Equipment  None    Pathology reviewed with patient:  N/A    Forms/Letters  No    All of his questions were answered including reviewing restrictions, pathologyNA, and wound care.  He will call this office if he has any further questions and/or concerns.       In lieu of a post-op clinic patient that patient would like to be contacted in approximately 7-10 days via telephone.    A copy of this note was routed to the primary surgeon.      Whom and When to Call  Patient acknowledges understanding of how to manage any medication changes and   when to seek medical care.     Patient advised that if after hour medical concerns arise to please call [phone] and choose option 4 to speak to the physician on call.

## 2023-10-04 NOTE — TELEPHONE ENCOUNTER
Home Care is calling regarding an established patient with [facility].       Requesting orders from: Chuy Isaac  Provider is following patient: Yes  Is this a 60-day recertification request?  No    Orders Requested    Skilled Nursing  Request for resumption in care.     Information was gathered and will be sent to provider for review.  RN will contact Home Care with information after provider review.  Confirmed ok to leave a detailed message with call back.  Contact information confirmed and updated as needed.    Angel Thomas RN

## 2023-10-04 NOTE — TELEPHONE ENCOUNTER
Requested Prescriptions   Pending Prescriptions Disp Refills    amphetamine-dextroamphetamine (ADDERALL) 20 MG tablet 30 tablet 0     Sig: TAKE ONE TABLET BY MOUTH ONCE DAILY       Routing refill request to provider for review/approval because:  Drug not on the FM, P or [facility] refill protocol or controlled substance

## 2023-10-04 NOTE — PROGRESS NOTES
Clinic Care Coordination Contact  [facility]/Voicemail       Clinical Data: Care Coordinator Outreach  Outreach attempted x 1.  Left message on patient's voicemail with call back information and requested return call.  Plan: Care Coordinator sent care coordination introduction letter on 5/20/2022 via [employer]. Care Coordinator will try to reach patient again in 1-2 business days.    Kinsey Muhammad RN Care Coordination   [facility]  Email: [email or web address]  Phone: [phone]

## 2023-10-05 ENCOUNTER — MYC MEDICAL ADVICE (OUTPATIENT)
Dept: SURGERY | Facility: CLINIC | Age: 53
End: 2023-10-05
Payer: COMMERCIAL

## 2023-10-05 ENCOUNTER — TELEPHONE (OUTPATIENT)
Dept: INTERNAL MEDICINE | Facility: CLINIC | Age: 53
End: 2023-10-05
Payer: COMMERCIAL

## 2023-10-05 NOTE — TELEPHONE ENCOUNTER
Wife states patient has a fever around 100 even with Tylenol. Has been laying downstairs, occasionally walking around the house and using the stairs. Has been pushing fluids and urine is normal. No BM since surgery, he is passing gas, taking Senna and Miralax. His home health nurse has been assessing incision and there are no signs of infection. He has been coughing but this is normal for him.     Encouraged patient to increase activity and to be doing deep breathing/coughing.   To continue to monitor temp.    Wife reached out to patients infectious disease team with concerns. Has not heard back. Patient has Cm.    Will notify Dr Mercado of above.

## 2023-10-05 NOTE — PROGRESS NOTES
Clinic Care Coordination Contact    Situation: Patient chart reviewed by care coordinator.    Background: Patient actively enrolled in care coordination.     Assessment: Patient completed TCM/hospital discharge with the endo RN CC.     Plan/Recommendations: RN CC will not call at this time for TCM as patient already completed this. RN CC will call patient next week as scheduled.     Kinsey Muhammad RN Care Coordination   [facility] Goldman  Email: [email or web address]  Phone: [phone]

## 2023-10-05 NOTE — TELEPHONE ENCOUNTER
Home Care is calling regarding an established patient with [facility].       Requesting orders from: Chuy Isaac  Provider is following patient: Yes  Is this a 60-day recertification request?  Yes    Orders Requested    Skilled Nursing  Request for recertification   Frequency:  1x/wk for 5 wks      Information was gathered and will be sent to provider for review.  RN will contact Home Care with information after provider review.  Confirmed ok to leave a detailed message with call back.  Contact information confirmed and updated as needed.    [phone]    Angel Thomas RN

## 2023-10-05 NOTE — PROGRESS NOTES
[facility] ED charge RN given report on patient and DR Duenas made aware patient is coming to the ED.

## 2023-10-06 NOTE — TELEPHONE ENCOUNTER
"Follow up call made to patient because he did not go to the ED as advised. He said he didn't come to ED because his temperature \"went way down\" temperature today 98.9. Feels a little sore but feeling better overall. Passing gas, has not had BM, started Miralax     Will let us know if his status changes.     Dr Mercaod informed of above.   "

## 2023-10-06 NOTE — TELEPHONE ENCOUNTER
Phoned Harshad and informed him of documentation per Dr. Poncho MD as stated below.  Harshad stated understanding.    Ella Hammond RN

## 2023-10-09 DIAGNOSIS — F41.9 CHRONIC ANXIETY: ICD-10-CM

## 2023-10-12 RX ORDER — ALPRAZOLAM 0.5 MG
0.5 TABLET ORAL 2 TIMES DAILY PRN
Qty: 60 TABLET | Refills: 0 | Status: SHIPPED | OUTPATIENT
Start: 2023-10-12 | End: 2023-11-07

## 2023-10-12 NOTE — TELEPHONE ENCOUNTER
Carafate Prescription approved per [facility] Refill Protocol.  CHRISTY Hurst         [place], [place]

## 2023-10-13 ENCOUNTER — TELEPHONE (OUTPATIENT)
Dept: INTERVENTIONAL RADIOLOGY/VASCULAR | Facility: CLINIC | Age: 53
End: 2023-10-13
Payer: COMMERCIAL

## 2023-10-13 ENCOUNTER — PATIENT OUTREACH (OUTPATIENT)
Dept: CARE COORDINATION | Facility: CLINIC | Age: 53
End: 2023-10-13
Payer: COMMERCIAL

## 2023-10-13 DIAGNOSIS — Z78.9 CENTRAL VENOUS CATHETER IN PLACE: Primary | ICD-10-CM

## 2023-10-13 NOTE — PROGRESS NOTES
Clinic Care Coordination Contact  [facility]/Voicemail       Clinical Data: Care Coordinator Outreach  Outreach attempted x 1.  Left message on patient's voicemail with call back information and requested return call.  Plan: Care Coordinator sent care coordination introduction letter on 5/20/2022 via [employer]. Care Coordinator will try to reach patient again in 10 business days.    Kinsey Muhammad RN Care Coordination   [facility] Yeyo Dove  Email: [email or web address]  Phone: [phone]

## 2023-10-13 NOTE — TELEPHONE ENCOUNTER
Writer is notified by patient spouse, Rose that the PURPLE lumen of tunneled central venous cathter is no longer functioning.      Last attempt to aspirate or infuse was Tuesday 10/10/2023.      As of Wednesday of this week, the RED lumen of this line will only infuse, no longer able to aspirate at all.      Rose states hat this problem has occurred before and she was told by IR Provider to call us if it happened again.    Last IR intervention on catheter 9/19/2023.  Catheter exchanged d/t malfunction and a new 6F x 27 cm tunneled line confirmed ready for use.    Patient using tunneled CVC for TPN, hydration and IV access.    I have informed IR team of status.  Plan made for central line check and orders entered.  I have notified Rose of plan of care and provided her with Scheduling contact number.

## 2023-10-16 ENCOUNTER — TELEPHONE (OUTPATIENT)
Dept: INTERNAL MEDICINE | Facility: CLINIC | Age: 53
End: 2023-10-16
Payer: COMMERCIAL

## 2023-10-16 NOTE — TELEPHONE ENCOUNTER
Reason for Call:  Form, our goal is to have forms completed with 72 hours, however, some forms may require a visit or additional information.    Type of letter, form or note:  Home Health Certification    Who is the form from?: Home care, [facility]    Where did the form come from:     What clinic location was the form placed at?: [facility]     Where the form was placed: Given to MA/RN    What number is listed as a contact on the form?:   P # [phone]  F # [phone]       Additional comments:  Resumption of care    Call taken on 10/16/2023 at 7:55 AM by Selam Barajas

## 2023-10-17 ENCOUNTER — INFUSION THERAPY VISIT (OUTPATIENT)
Dept: INFUSION THERAPY | Facility: CLINIC | Age: 53
End: 2023-10-17
Attending: SURGERY
Payer: COMMERCIAL

## 2023-10-17 VITALS
RESPIRATION RATE: 18 BRPM | TEMPERATURE: 98.1 F | SYSTOLIC BLOOD PRESSURE: 114 MMHG | DIASTOLIC BLOOD PRESSURE: 67 MMHG | OXYGEN SATURATION: 99 % | HEART RATE: 84 BPM

## 2023-10-17 DIAGNOSIS — D50.8 IRON DEFICIENCY ANEMIA SECONDARY TO INADEQUATE DIETARY IRON INTAKE: Primary | ICD-10-CM

## 2023-10-17 DIAGNOSIS — E61.1 IRON DEFICIENCY: ICD-10-CM

## 2023-10-17 DIAGNOSIS — E86.0 DEHYDRATION: ICD-10-CM

## 2023-10-17 DIAGNOSIS — E46 MALNUTRITION, UNSPECIFIED TYPE (H): ICD-10-CM

## 2023-10-17 PROCEDURE — 250N000011 HC RX IP 250 OP 636: Mod: JZ | Performed by: INTERNAL MEDICINE

## 2023-10-17 PROCEDURE — 36593 DECLOT VASCULAR DEVICE: CPT

## 2023-10-17 RX ORDER — HEPARIN SODIUM,PORCINE 10 UNIT/ML
5-20 VIAL (ML) INTRAVENOUS DAILY PRN
Status: DISCONTINUED | OUTPATIENT
Start: 2023-10-17 | End: 2023-10-17 | Stop reason: HOSPADM

## 2023-10-17 RX ORDER — HEPARIN SODIUM,PORCINE 10 UNIT/ML
5-20 VIAL (ML) INTRAVENOUS DAILY PRN
Status: CANCELLED | OUTPATIENT
Start: 2023-10-17

## 2023-10-17 RX ADMIN — Medication 5 ML: at 11:59

## 2023-10-17 RX ADMIN — ALTEPLASE 2 MG: 2.2 INJECTION, POWDER, LYOPHILIZED, FOR SOLUTION INTRAVENOUS at 10:50

## 2023-10-17 ASSESSMENT — PAIN SCALES - GENERAL: PAINLEVEL: MODERATE PAIN (4)

## 2023-10-17 NOTE — PROGRESS NOTES
Infusion Nursing Note:  Parker Acevedo presents today for check CVC and possible alteplase.    Patient seen by provider today: No   present during visit today: Not Applicable.    Note: Patient ambulated to IVO with wife-Rose. Home care unable to get labs from either lumen. Talked with DR. Isaac for VAD/Alteplase orders. New CVC inserted 9/19/23.   Purple lumen occluded. Unable to flush, cap changed.   Red lumen flushes firmly, mo blood return. Alteplase given.   No blood return in 30 minutes. Rechecked in 60 minutes and 5ml blood removed. Flushed per protocol.       Intravenous Access:  Cm.    Treatment Conditions:  Not Applicable.      Post Infusion Assessment:  Site patent and intact, free from redness, edema or discomfort.  Positive blood return in 60 minutes after alteplase given.       Discharge Plan:   Patient discharged in stable condition accompanied by: self and wife.  Departure Mode: Ambulatory.  Patient wants to come in monthly for labs vs home care to draw. Discussed with them we need orders and to talk with DR. Isaac's office. .      Miri Mir RN

## 2023-10-18 NOTE — TELEPHONE ENCOUNTER
Reviewed [facility].  Medication reconciliation completed by RN on 10/18/23.     Forms placed in Dr. Isaac's basket for signature.    Certification period: 09/12/23 to 11/10/23.

## 2023-10-19 ENCOUNTER — OFFICE VISIT (OUTPATIENT)
Dept: SURGERY | Facility: CLINIC | Age: 53
End: 2023-10-19
Payer: COMMERCIAL

## 2023-10-19 VITALS
DIASTOLIC BLOOD PRESSURE: 71 MMHG | HEIGHT: 71 IN | SYSTOLIC BLOOD PRESSURE: 110 MMHG | HEART RATE: 71 BPM | WEIGHT: 197.7 LBS | OXYGEN SATURATION: 99 % | BODY MASS INDEX: 27.68 KG/M2

## 2023-10-19 DIAGNOSIS — E44.0 MODERATE PROTEIN-CALORIE MALNUTRITION (H): Primary | ICD-10-CM

## 2023-10-19 PROCEDURE — 99024 POSTOP FOLLOW-UP VISIT: CPT | Mod: GC | Performed by: SURGERY

## 2023-10-19 ASSESSMENT — PAIN SCALES - GENERAL: PAINLEVEL: MODERATE PAIN (4)

## 2023-10-19 NOTE — PROGRESS NOTES
Bariatric Surgery Follow-up    S: Doing well since discharge on 10/3. Tolerating a few bites of food. Abdominal pain improving slowly. Passing flatus and having daily BMs. No new fevers at home.      O:  There were no vitals taken for this visit.     Gen: Awake, alert, answers questions appropriately   CV: Well perfused  Pulm: NLB on RA  Ab: Soft, non-distended, appropriately tender. Midline incision with staples in place, some mild surrounding irritation.  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 Patient underwent exploratory laparotomy on 9/30 for small bowel intussusception with small bowel resection. Recovering well.     - Staples removed today in clinic  - Follow up as needed    Seen with staff, Dr. Lilliam Davis, PGY-5  General Surgery    Physician Attestation  I, Farncis Vyas, saw and evaluated this patient as part of a shared visit.  I have reviewed and discussed with the advanced practice provider and/or resident their history, physical and plan.    I personally reviewed the vital signs, medications, labs and imaging.    My key history or physical exam findings: prior laparotomy is well-healed.  Skin staples removed    Key management decisions made by me: follow-up as needed.  Continue tpn.    Francis Vyas MD  Date of Service (when I saw the patient): 10/19/23

## 2023-10-19 NOTE — LETTER
10/19/2023       RE: Parker Acevedo  [address]  [place] [facility] [address]     Dear Colleague,    Thank you for referring your patient, Parker Acevedo, to the [facility] [place] [facility]. Please see a copy of my visit note below.    Bariatric Surgery Follow-up    S: Doing well since discharge on 10/3. Tolerating a few bites of food. Abdominal pain improving slowly. Passing flatus and having daily BMs. No new fevers at home.      O:  There were no vitals taken for this visit.     Gen: Awake, alert, answers questions appropriately   CV: Well perfused  Pulm: NLB on RA  Ab: Soft, non-distended, appropriately tender. Midline incision with staples in place, some mild surrounding irritation.  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 Patient underwent exploratory laparotomy on 9/30 for small bowel intussusception with small bowel resection. Recovering well.     - Staples removed today in clinic  - Follow up as needed    Seen with staff, Dr. Lilliam Davis, PGY-5  General Surgery    Physician Attestation  I, Francis Vyas, saw and evaluated this patient as part of a shared visit.  I have reviewed and discussed with the advanced practice provider and/or resident their history, physical and plan.    I personally reviewed the vital signs, medications, labs and imaging.    My key history or physical exam findings: prior laparotomy is well-healed.  Skin staples removed    Key management decisions made by me: follow-up as needed.  Continue tpn.    Date of Service (when I saw the patient): 10/19/23        Again, thank you for allowing me to participate in the care of your patient.      Sincerely,    Francis Vyas MD

## 2023-10-19 NOTE — NURSING NOTE
"Chief Complaint   Patient presents with    RECHECK     Post-op, removal sutures       Vitals:    10/19/23 0912   BP: 110/71   BP Location: Left arm   Patient Position: Sitting   Cuff Size: Adult Regular   Pulse: 71   SpO2: 99%   Weight: 89.7 kg (197 lb 11.2 oz)   Height: 1.803 m (5' 11\")       Body mass index is 27.57 kg/m .                          Tino Leo, EMT    "

## 2023-10-20 DIAGNOSIS — I82.90 VTE (VENOUS THROMBOEMBOLISM): ICD-10-CM

## 2023-10-20 DIAGNOSIS — G89.29 CHRONIC BILATERAL LOW BACK PAIN WITHOUT SCIATICA: ICD-10-CM

## 2023-10-20 DIAGNOSIS — R19.8 VISCERAL HYPERALGESIA: ICD-10-CM

## 2023-10-20 DIAGNOSIS — Z98.84 GASTRIC BYPASS STATUS FOR OBESITY: ICD-10-CM

## 2023-10-20 DIAGNOSIS — F11.90 CHRONIC, CONTINUOUS USE OF OPIOIDS: ICD-10-CM

## 2023-10-20 DIAGNOSIS — G89.4 CHRONIC PAIN SYNDROME: ICD-10-CM

## 2023-10-20 DIAGNOSIS — M54.50 CHRONIC BILATERAL LOW BACK PAIN WITHOUT SCIATICA: ICD-10-CM

## 2023-10-20 DIAGNOSIS — M54.2 CERVICALGIA: ICD-10-CM

## 2023-10-20 DIAGNOSIS — G89.29 CHRONIC ABDOMINAL PAIN: ICD-10-CM

## 2023-10-20 DIAGNOSIS — R11.0 NAUSEA: ICD-10-CM

## 2023-10-20 DIAGNOSIS — R10.9 CHRONIC ABDOMINAL PAIN: ICD-10-CM

## 2023-10-20 RX ORDER — OXYCODONE HCL 5 MG/5 ML
5-10 SOLUTION, ORAL ORAL EVERY 4 HOURS PRN
Qty: 1200 ML | Refills: 0 | Status: SHIPPED | OUTPATIENT
Start: 2023-10-20 | End: 2023-11-01

## 2023-10-20 RX ORDER — ONDANSETRON 8 MG/1
TABLET, ORALLY DISINTEGRATING ORAL
Qty: 90 TABLET | Refills: 1 | Status: SHIPPED | OUTPATIENT
Start: 2023-10-20 | End: 2023-12-09

## 2023-10-20 RX ORDER — FENTANYL 25 UG/1
1 PATCH TRANSDERMAL
Qty: 15 PATCH | Refills: 0 | Status: SHIPPED | OUTPATIENT
Start: 2023-10-20 | End: 2023-11-01

## 2023-10-20 RX ORDER — SUCRALFATE ORAL 1 G/10ML
SUSPENSION ORAL
Qty: 420 ML | Refills: 1 | Status: ON HOLD | OUTPATIENT
Start: 2023-10-20 | End: 2024-01-25

## 2023-10-20 NOTE — TELEPHONE ENCOUNTER
Received call from patient requesting refill(s) of  fentaNYL (DURAGESIC) 25 mcg/hr 72 hr patch   And  oxyCODONE (ROXICODONE) 5 MG/5ML solution     Last dispensed from pharmacy on 09/28/23 for both    Patient's last office/virtual visit by prescribing provider on 08/23/23  Next office/virtual appointment scheduled for 1/01/23    Last urine drug screen date 11/09/22  Current opioid agreement on file (completed within the last year) Yes Date of opioid agreement: 11/09/22    E-prescribe to pharmacy-[facility] - [place], [place] - [address]     Will route to nursing pool for review and preparation of prescription(s).

## 2023-10-20 NOTE — TELEPHONE ENCOUNTER
Signed Prescriptions:                        Disp   Refills    fentaNYL (DURAGESIC) 25 mcg/hr 72 hr patch 15 pat*0        Sig: Place 1 patch onto the skin every 48 hours Fill           10/28/23 and start 10/30/23. 30 day supply for           chronic pain.  Authorizing Provider: NATALIE MCDOWELL    oxyCODONE (ROXICODONE) 5 MG/5ML solution   1200 mL0        Sig: Take 5-10 mLs (5-10 mg) by mouth every 4 hours as           needed for moderate to severe pain Max of           40mg/day. Fill 10/28/23 and start 10/30/23. 30           day supply for chronic pain.  Authorizing Provider: NATALIE MCDOWELL        Reviewed [place]  October 20, 2023- no concerning fills.    Natalie Mcdowell APRN, RN CNP, FNP  [facility]  [facility]

## 2023-10-22 RX ORDER — ENOXAPARIN SODIUM 100 MG/ML
INJECTION SUBCUTANEOUS
Qty: 67.2 ML | Refills: 0 | Status: SHIPPED | OUTPATIENT
Start: 2023-10-22 | End: 2023-12-13

## 2023-10-23 ENCOUNTER — TELEPHONE (OUTPATIENT)
Dept: PALLIATIVE MEDICINE | Facility: CLINIC | Age: 53
End: 2023-10-23
Payer: COMMERCIAL

## 2023-10-23 NOTE — TELEPHONE ENCOUNTER
Yes, they can be filled on 10/27/2023. Let me know if you need anything further from me. Thanks for reaching out.     Natalie MENARD, RN CNP, FNP  [facility]

## 2023-10-23 NOTE — TELEPHONE ENCOUNTER
Both scripts (Fentanyl and oxycodone) have a start date of 10/28/23, which is Saturday when we are not open, can we fill them on Friday 10/27?      On behalf of [facility]  Thank You~  Aleksandra Araujo [facility]

## 2023-10-24 ENCOUNTER — MEDICAL CORRESPONDENCE (OUTPATIENT)
Dept: HEALTH INFORMATION MANAGEMENT | Facility: CLINIC | Age: 53
End: 2023-10-24
Payer: COMMERCIAL

## 2023-10-27 ENCOUNTER — PATIENT OUTREACH (OUTPATIENT)
Dept: CARE COORDINATION | Facility: CLINIC | Age: 53
End: 2023-10-27
Payer: COMMERCIAL

## 2023-10-27 ASSESSMENT — ACTIVITIES OF DAILY LIVING (ADL): DEPENDENT_IADLS:: TRANSPORTATION

## 2023-10-27 NOTE — PROGRESS NOTES
Clinic Care Coordination Contact  Follow Up Progress Note      Assessment: RN CC called and spoke to the patient's wife Rose. Patient is due for a monthly outreach. Patient's wife said things have been going OK. She said the only thing they are dealing with is finding some rings patient had his last hospital stay. Wife said the patient had 3 rings and they got lost at some point. Wife said the patient had a CT scan in the emergency room and that is when they think the rings were removed. Wife said she has tried to call but no one can seem to help her. RN CC will see if there is someone to contact patient's wife. Patient's wife can't think of any other needs. She will reach out if something comes up.     Care Gaps:    Health Maintenance Due   Topic Date Due    SPIROMETRY  Never done    COPD ACTION PLAN  Never done    HEPATITIS B IMMUNIZATION (1 of 3 - 3-dose series) Never done    MEDICARE ANNUAL WELLNESS VISIT  06/21/2017    INFLUENZA VACCINE (1) 09/01/2023    COVID-19 Vaccine (3 - 2023-24 season) 09/01/2023    ADVANCE CARE PLANNING  10/11/2023    ZOSTER IMMUNIZATION (1 of 2) 11/06/2022       Postponed to next Primary Care Provider visit     Care Plans  Care Plan: Annual Wellness       Problem: Appointment       Goal: I will complete my annual wellness visit.       Start Date: 5/20/2022 Expected End Date: 10/2/2022    This Visit's Progress: 20% Recent Progress: 20%    Note:     Barriers: complex care, and high volume of appointments at baseline  Strengths: wife is pts main caregiver, and organizes his appointments.   Patient expressed understanding of goal: yes  Action steps to achieve this goal:  1. I will schedule my annual wellness visit; [phone] ([phone])  2. I will attend my annual wellness visit.  3. I will contact my Care Management or clinic team if I have barriers to attending my annual wellness visit.                                Intervention/Education provided during outreach: As above. CC  role, goal(s), clinic after hours, appointments, discussed/reviewed. Support provided.     Outreach Frequency: monthly, more frequently as needed    Plan:   Patient will take all medications as prescribed.   Patient will schedule any needed follow ups.   RN CC will see about someone contacting wife about rings.   Patient/caregiver will call RNCC with questions, concerns, support needs. RNCC will be available as needed.     Care Coordinator will follow up in 1 month.     Kinsey Muhammad RN Care Coordination   [facility]  Email: [email or web address]  Phone: [phone]

## 2023-10-29 NOTE — PROGRESS NOTES
"CLINICAL NUTRITION SERVICES - ASSESSMENT NOTE     Nutrition Prescription      Malnutrition Status:    Unable to determine due to incomplete data    Recommendations already ordered by Registered Dietitian (RD):  Clinimix peripheral formulation @ 100 ml/hr - Dex 5%, AA 4.25% + 250 ml IV lipids daily.  This will provide 1316 kcal (17 kcal/kg), 102 grams protein (1.3g/kg) - meeting ~70% kcal needs and 100% protein needs.      Future/Additional Recommendations:  Upon resumption of home TPN regimen - consider increase in overall calories (if appropriate and patient agreeable) and increase in TPN fluid provisions.       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  He has a hx of multiple hospital admissions for Port/PICC infections.     Does not tolerate PO, other than liquids with G-tube vented.    Home formula:   2 days/week (Sat,Sun) - 2150 ml with 235g dex, 95g AA, no IV lipids  5 days/week (M-F) - 1800 ml with 235g dex, 95g AA + 350 ml IV lipids  7 day average received = 1679 kcal/day (22 kcal/kg), 95 grams protein/day (1.3g/kg)    Uses a cane d/t weakness.      Has a G-tube for venting.     Intermittently uses additional IVF for hydration, but hasn't been able to over the last week or so d/t busy schedule.      Last used TPN Thurs, 1/11/18.    CURRENT NUTRITION ORDERS  Diet: NPO    Intake/Tolerance: Per MD note - will need PPN vs new TPN pending central line access    LABS  K+ 3.0 (low)  , Albumin 3.3 - albumin low d/t inflammatory response to infection    MEDICATIONS  Medications reviewed    ANTHROPOMETRICS  Height: 180.3 cm (5' 11\")  Most Recent Weight: 84.4 kg (186 lb)    IBW: 78.2 kg  BMI: Overweight BMI 25-29.9  Weight History: " Weight goal for TPN listed as 170#.  Per patient's SNV weights it seems that weight is generally close to 175# as of late. TPN dosing weight = 76 kg.  Will continue with this as patient's dosing weight as it seems current is elevated d/t fluid status.    Wt Readings from Last 15 Encounters:   01/12/18 84.4 kg (186 lb)   01/12/18 84.4 kg (186 lb)   12/19/17 89.6 kg (197 lb 9.6 oz)   11/06/17 79.9 kg (176 lb 1.6 oz)   09/21/17 92.5 kg (204 lb)   09/18/17 90.7 kg (200 lb)   09/13/17 89.1 kg (196 lb 6.4 oz)   08/01/17 79.4 kg (175 lb 1.6 oz)   07/24/17 78.9 kg (174 lb)   07/06/17 88.9 kg (195 lb 14.4 oz)   07/05/17 89.4 kg (197 lb)   06/29/17 91.9 kg (202 lb 8 oz)   06/01/17 89.2 kg (196 lb 11.2 oz)   05/09/17 79.1 kg (174 lb 4.8 oz)   04/11/17 78.9 kg (174 lb)     Dosing Weight: 76 kg    ASSESSED NUTRITION NEEDS  Estimated Energy Needs: 0885-5569 kcals/day (25 - 30 kcals/kg)  Justification: Maintenance  Estimated Protein Needs: 76-91 grams protein/day (1 - 1.2 grams of pro/kg)+  Justification: Maintenance  Estimated Fluid Needs: 6123-2563 mL/day (25 - 30 mL/kg)   Justification: Maintenance    PHYSICAL FINDINGS  See malnutrition section below.    MALNUTRITION  % Intake: No decreased intake noted  % Weight Loss: None noted (per home infusion records - see chart)  Subcutaneous Fat Loss: Unable to assess  Muscle Loss: Unable to assess  Fluid Accumulation/Edema: Unable to assess  Malnutrition Diagnosis: Unable to determine due to incomplete data    NUTRITION DIAGNOSIS  Inadequate parenteral nutrition infusion related to central line infection as evidenced by provider requesting peripheral PN d/t infection via central line and last received TPN on 1/11/18.      INTERVENTIONS  Implementation  Nutrition Education: Unable to complete due to attempted to see patient twice, but was with another provider.      Collaboration with other providers - obtained home TPN formula from home infusion, spoke with PharmD re: plan for IV  nutrition while in hospital, see above recommendations - entered TPN change order.     Goals  Resume central PN within 5-7 days.     Monitoring/Evaluation  Progress toward goals will be monitored and evaluated per protocol.    Nicolette Esqueda MS, RD, LD  Pager [phone]       no weight-bearing restrictions

## 2023-10-31 ENCOUNTER — MYC MEDICAL ADVICE (OUTPATIENT)
Dept: INTERNAL MEDICINE | Facility: CLINIC | Age: 53
End: 2023-10-31
Payer: COMMERCIAL

## 2023-10-31 NOTE — PROGRESS NOTES
Clinic Care Coordination Contact  Care Team Conversations    RN CC messaged Anjelica Alas to ask about patient's lost rings. Will follow up once RN CC hears back from Anjelica Alas.     Kinsey Muhammad RN Care Coordination   [facility]  Email: [email or web address]  Phone: [phone]

## 2023-11-01 ENCOUNTER — OFFICE VISIT (OUTPATIENT)
Dept: PALLIATIVE MEDICINE | Facility: CLINIC | Age: 53
End: 2023-11-01
Payer: COMMERCIAL

## 2023-11-01 ENCOUNTER — LAB (OUTPATIENT)
Dept: LAB | Facility: CLINIC | Age: 53
End: 2023-11-01
Payer: COMMERCIAL

## 2023-11-01 VITALS — DIASTOLIC BLOOD PRESSURE: 81 MMHG | HEART RATE: 57 BPM | SYSTOLIC BLOOD PRESSURE: 126 MMHG

## 2023-11-01 DIAGNOSIS — F11.90 CHRONIC, CONTINUOUS USE OF OPIOIDS: ICD-10-CM

## 2023-11-01 DIAGNOSIS — G89.4 CHRONIC PAIN SYNDROME: ICD-10-CM

## 2023-11-01 DIAGNOSIS — G89.29 CHRONIC BILATERAL LOW BACK PAIN WITHOUT SCIATICA: ICD-10-CM

## 2023-11-01 DIAGNOSIS — M79.2 NEUROPATHIC PAIN: ICD-10-CM

## 2023-11-01 DIAGNOSIS — G89.29 CHRONIC ABDOMINAL PAIN: ICD-10-CM

## 2023-11-01 DIAGNOSIS — Z79.891 ENCOUNTER FOR LONG-TERM USE OF OPIATE ANALGESIC: ICD-10-CM

## 2023-11-01 DIAGNOSIS — R10.9 CHRONIC ABDOMINAL PAIN: ICD-10-CM

## 2023-11-01 DIAGNOSIS — M54.50 CHRONIC BILATERAL LOW BACK PAIN WITHOUT SCIATICA: ICD-10-CM

## 2023-11-01 DIAGNOSIS — R19.8 VISCERAL HYPERALGESIA: Primary | ICD-10-CM

## 2023-11-01 PROCEDURE — 80307 DRUG TEST PRSMV CHEM ANLYZR: CPT

## 2023-11-01 PROCEDURE — 99214 OFFICE O/P EST MOD 30 MIN: CPT | Performed by: NURSE PRACTITIONER

## 2023-11-01 PROCEDURE — G0480 DRUG TEST DEF 1-7 CLASSES: HCPCS | Mod: 59

## 2023-11-01 PROCEDURE — 80307 DRUG TEST PRSMV CHEM ANLYZR: CPT | Mod: 90

## 2023-11-01 PROCEDURE — 99000 SPECIMEN HANDLING OFFICE-LAB: CPT

## 2023-11-01 RX ORDER — PREGABALIN 25 MG/1
CAPSULE ORAL
Qty: 120 CAPSULE | Refills: 0 | Status: SHIPPED | OUTPATIENT
Start: 2023-11-01 | End: 2024-03-04

## 2023-11-01 RX ORDER — FENTANYL 25 UG/1
1 PATCH TRANSDERMAL
Qty: 15 PATCH | Refills: 0 | Status: SHIPPED | OUTPATIENT
Start: 2023-11-01 | End: 2023-12-07

## 2023-11-01 RX ORDER — OXYCODONE HCL 5 MG/5 ML
5-10 SOLUTION, ORAL ORAL EVERY 4 HOURS PRN
Qty: 1200 ML | Refills: 0 | Status: SHIPPED | OUTPATIENT
Start: 2023-11-01 | End: 2023-12-07

## 2023-11-01 ASSESSMENT — PAIN SCALES - PAIN ENJOYMENT GENERAL ACTIVITY SCALE (PEG)
INTERFERED_GENERAL_ACTIVITY: 5
AVG_PAIN_PASTWEEK: 5
INTERFERED_ENJOYMENT_LIFE: 4
PEG_TOTALSCORE: 4.67

## 2023-11-01 ASSESSMENT — PAIN SCALES - GENERAL: PAINLEVEL: MODERATE PAIN (5)

## 2023-11-01 NOTE — PROGRESS NOTES
[facility]    11/1/2023    Chief complaint:   Ongoing abdominal pain  -recent ex lap for jejunal intussusception on 9/30/2023      Interval history:  Parker Acevedo is a 52 year old male is known to me for:  Visceral hyperalgesia  Chronic abdominal pain  Chronic pain syndrome  PMHx includes: ADHD, anxiety, cardiomyopathy and nutritional diseases, chronic abdominal pain, central line associated bloodstream infection recurrent, anesthesia complication, difficulty swallowing, gastric ulcer unspecified as acute or chronic without mention of hemorrhage perforation or obstruction, gastroesophageal reflux disease, head injury, hiatal hernia, other bladder disorder, other chronic pain, postop nausea and vomiting, severe malnutrition on TPN, short gut syndrome, tobacco abuse  PSHx includes: Appendectomy, back surgery (11/3/2014), biopsy of cervical lymph node (2/20/2015), cholecystectomy, colonoscopy (2021, 2022), cervical anterior 1 level discectomy and fusion (2/15/2017), endoscopic insertion tube gastrostomy (9/9/2013), endoscopic ultrasound upper gastrointestinal tract (4/29/2011), endoscopic ultrasound upper gastrointestinal tract (9/9/2013), endoscopic ultrasound upper gastrointestinal tract (2/24/2021), endoscopy (3/25/2011, 8/4/2009, 1/5/2009), multiple EGDs, gastrectomy (6/22/2012), gastrojejunostomy (8/26/2009), umbilical hernia repair (2006), hernia raphe hiatal (6/22/2012), herniorrhaphy inguinal (11/22/2013), insert PICC line on the right (12/19/2019), insert PICC line right (2/21/2020), insert vascular access port on the right (12/19/2017, 8/2/2018), multiple interventional radiology CVC tunnel placement and removals, exploratory laparotomy (11/22/2013), orthopedic surgery, Celestino-en-Y gastric bypass (2003), tonsillectomy.        Recommendations/plan at the last visit on 7/23/2023 included:  Physical Therapy: none  Clinical Health Psychologist to address issues of relaxation,  behavioral change, coping style, and other factors important to improvement: none  Continue gentle movement at home  Diagnostic Studies: none  Medication Management:   Continue fentanyl patch 25mcg/hr every 48 hours, fill 8/29 and start 8/31  Oxycodone liquid 5mg/5ml,  use 5-10mg (5-10mls) every 4 hours as needed, max of 40mg (40ml) per day. Fill 8/29 and start 8/31  Further procedures recommended: none  Recommendations/follow-up for PCP:  See above  Release of information: none  Follow up: 12 weeks for in-person visit. Please call [phone] to make your follow-up appointment with me.       Since his last visit, Parker Acevedo reports:    Interval history November 1, 2023  -had exploratory laparoscopy on 9/30 given jujunal intussusception   -ongoing abdominal pain, has had multiple complex abdominal surgeries over the years.   -his pain is stable on current dosing of fentanyl patch and liquid oxycodone.   -spirits are good        Interval history August 23, 2023  -he was in the hospital overnight from 8/6 to 8/7, had bacteremia  -they are having their home reroofed, re-sided and remodeled due to black mold.   -they have to move out due to the black mold and health issues while remediating   -his abdominal pain had been up when he was ill. Then got to baseline.   -his stress levels are high now due to the black mold so his stress/anxiety is high.      Interval history May 23, 2023  -he has ongoing abdominal pain. He will now feel very ill about 3-4 times per month. When he feels like this, he often needs to go to the ER and get his medications via IV.   -he was not able to complete his colonoscopy as he was not as cleaned out enough.   -discussed possible Lyrica or Cymbalta. He prefers to keep his medications the same.    Interval history February 6, 2023  -struck his head on a cupboard reaching for a glass in the kitchen and cracked his head, had to get stiches above his eye in the forehead.   -he states he  didn't have a work up for a concussion.   -he has had more headaches since striking his head.   -encouraged him to see his Primary Care Provider   -he has had nausea prior to the head injury.   -he has had nausea and vomiting that comes and goes. It is not worse now after the head injury.   -he has a lot of swelling around the laceration per patient report, I encouraged him to be seen in urgent care for evaluation.     Interval history November 9, 2022  -Parker has ongoing abdominal pain  -he relates that the recheck for Hep B was negative  -he is stable on his current regimen of fentanyl patch and oxycodone liquid    Interval history August 23, 2022  -abdominal pain, pretty bad today, ID will recheck him for Hep B and are doing some updated imaging.   -insurance adjusters at his home now for floyd issues  -left knee pain after slip and fall, seen in ER on 8/9/2022 and in immobilizer, will follow up with ortho.     Pain history collected at initial visit on 5/27/2022  He had gastric bypass in 2003 and about 5 years after that, he developed gastric ulcers. He ended up having surgery for gastric ulcer and hernias and such. He has had over 11 surgeries for his abdomen. He is malnourished due to short-gut syndrome. He has a J-tube that is open to vent bile from his stomach as he gets bloated.   Worst pain is inside the abdominal cavity. He will have pain so bad that he states he screams for his mother. He feels that this is a deep inside pain.   -he would like to increase his oxycodone dose by one dose per day. His activity is limited by not having medication to cover him for his daily activities.      -he has a DVT in his right arm and in his right jugular vein. He is on lovenox.         At this point, the patient's participation with our multidisciplinary team includes:  The patient has been compliant with the program.  PT - none  Health Psych - none      Pain scores:   Pain right now is 5/10.   Pain is described as  "\"sharp, burning, agonizing and like on fire or like pirahnas gnawing me inside out,  Can bee a really deep, hard ache.\"    Pain is constant in nature    Current pain relevant medications:   -tylenol 32mg/ml liquid take 15.65mls (500mg) Q 4 hours PRN fever/mild pain  (somewhat helpful for headaches)  --Duragesic 25mcg/hr Q 48 hours (helpful)  -lidoderm 5% patch PRN (helpful)  -Narcan nasal spray for opiate reversal   -Oxycodone 5mg/5ml solution take 5-10mls (5-10mg) TID PRN max of 40mg/day with 4 hours between doses.      Other pertinent medications:  -Adderall 20mg every day  -LOVENOX 120mg Subcutaneously every day  -lorazepam 1mg for anxiety with TPN and meds once per day (uses most nights)  -Zofran 8mg Q 8 hours PRN     Previous medication treatments included:  OPIATES: Fentanyl (helpful), morphine (hallucinations), Dilaudid (not helpful, did not dissolve), Tylenol #3 (not helpful), hydrocodone (not helpful), Belbuca (not helpful--he had a really heavy chest feeling)  NSAIDS: cannot take due to severe gastric ulcer disease  MUSCLE RELAXANTS: none  ANTI-MIGRAINE MEDS: none  ANTI-DEPRESSANTS: none  SLEEP AIDS: benadryl (helpful)  ANTI-CONVULSANTS: gabapentin (bad headaches and acid reflux),  TOPICALS: Lidocaine patches (helpful)  ANXIOLYTICS: valium (helpful 5mg dosage), lorazepam (helpful)  MEDICAL CANNABIS: not interested  Other meds: tylenol (helpful for headaches)        Other treatments have included:  Parker Acevedo has been seen at a pain clinic in the past.  Previously managed here by Dr. Jessica Milligan. Also seen by [facility] for possible implanted intrathecal pain pump, he is not candidate due to infection risk.   PT: tried, never helped his neck or abdominal surgery  Massage Therapy: helpful for a day or two  Chiropractic care: tried, helped some   Acupuncture: tried, not helpful  TENs Unit: tried, not helpful  Healing Touch: not helpful     Injections:   -previously had injections for his neck with Dr." "Jessica Milligan      Surgeries:  9/30/2023 exploratory laparotomy, reduction of intussusception, resection of small bowel with primary anastamosis, and upper endoscopy with Dr. Mercado (helpful)      THE 4 A's OF OPIOID MAINTENANCE ANALGESIA    Analgesia: keeps pain pretty manageable    Activity: he walks daily, light housekeeping    Adverse effects: none    Adherence to Rx protocol: yes      Side Effects: no side effect  Patient is using the medication as prescribed: YES    Medications:  Current Outpatient Medications   Medication Sig Dispense Refill    albuterol (VENTOLIN HFA) 108 (90 Base) MCG/ACT inhaler Inhale 2 puffs into the lungs every 6 hours as needed 18 g 1    ALPRAZolam (XANAX) 0.5 MG tablet TAKE 1 TABLET (0.5 MG) BY MOUTH 2 TIMES DAILY AS NEEDED FOR SLEEP OR ANXIETY 60 tablet 0    amphetamine-dextroamphetamine (ADDERALL) 20 MG tablet TAKE ONE TABLET BY MOUTH ONCE DAILY 30 tablet 0    bisacodyl (DULCOLAX) 5 MG EC tablet Two days prior to exam take two (2) tablets at 4pm. One day prior to exam take two (2) tablets at 4pm 4 tablet 0    carvedilol (COREG) 6.25 MG tablet TAKE 2 TABLETS (12.5 MG) BY MOUTH 2 TIMES DAILY WITH MEALS 60 tablet 3    cyanocobalamin (CYANOCOBALAMIN) 1000 MCG/ML injection INJECT 1 ML INTO THE MUSCLE EVERY 30 DAYS 1 mL 3    enoxaparin ANTICOAGULANT (LOVENOX) 80 MG/0.8ML syringe INJECT THE CONTENTS OF ONE SYRINGE (80MG) UNDER THE SKIN TWO TIMES A DAY 67.2 mL 0    [facility]NAGED PATIENT Contact [facility] for patient specific medication information at [phone] on admission and discharge from the hospital.  Phones are answered 24 hours a day 7 days a week 365 days a year.    Providers - Choose \"CONTINUE HOME MED (no script)\" at discharge if patient treatment with home infusion will continue.      fentaNYL (DURAGESIC) 25 mcg/hr 72 hr patch Place 1 patch onto the skin every 48 hours Fill 10/28/23 and start 10/30/23. 30 day supply for chronic pain. 15 " patch 0    lipids plant base (CLINOLIPID) 20 % infusion Inject 250 mLs into the vein every 24 hours 1000 mL 3    naloxone (NARCAN) 4 MG/0.1ML nasal spray Spray 1 spray (4 mg) into one nostril alternating nostrils as needed for opioid reversal every 2-3 minutes until assistance arrives 0.2 mL 3    naloxone (NARCAN) 4 MG/0.1ML nasal spray Spray 1 spray (4 mg) into one nostril alternating nostrils once as needed for opioid reversal every 2-3 minutes until assistance arrives 0.2 mL 0    nystatin (MYCOSTATIN) 585571 UNIT/GM external cream APPLY TOPICALLY 2 TIMES DAILY 30 g 1    ondansetron (ZOFRAN ODT) 4 MG ODT tab Take 1 tablet (4 mg) by mouth every 8 hours as needed for nausea 12 tablet 0    ondansetron (ZOFRAN ODT) 8 MG ODT tab DISSOLVE ONE TABLET ON TONGUE EVERY 8 HOURS AS NEEDED FOR NAUSEA 90 tablet 1    ondansetron (ZOFRAN) 4 MG tablet Take one tablet every six hours for nausea during colonoscopy bowel prepping 3 tablet 0    oxyCODONE (ROXICODONE) 5 MG/5ML solution Take 5-10 mLs (5-10 mg) by mouth every 4 hours as needed for moderate to severe pain Max of 40mg/day. Fill 10/28/23 and start 10/30/23. 30 day supply for chronic pain. 1200 mL 0    oxyCODONE (ROXICODONE) 5 MG/5ML solution Take 5 mLs (5 mg) by mouth every 6 hours as needed for moderate pain or severe pain (5 mg for moderate pain; 10 mg for severe pain) 30 mL 0    pantoprazole (PROTONIX) 40 MG EC tablet TAKE 1 TABLET (40 MG) BY MOUTH DAILY 30 tablet 5    polyethylene glycol (GOLYTELY) 236 g suspension Take as directed. Two days before your exam fill the first container with water. Cover and shake until mixed well. At 5:00pm drink one 8oz glass every 10-15 minutes until half (1/2) of the first container is empty. Store the remainder in the refrigerator. One day before your exam at 5:00pm drink the second half of the first container until it is gone. Before you go to bed mix the second container with water and put in refrigerator. Six hours before your check  "in time drink one 8oz glass every 10-15 minutes until half of container is empty. Discard the remainder of solution. 8000 mL 0    polyethylene glycol (MIRALAX) 17 GM/Dose powder Take 17 g by mouth daily 1020 g 3    sucralfate (CARAFATE) 1 GM/10ML suspension TAKE 10MLS  BY MOUTH FOUR TIMES A DAY AS NEEDED 420 mL 1    Syringe/Needle, Disp, (B-D ECLIPSE SYRINGE) 27G X 1/2\" 1 ML MISC 1 Device every 30 days 30 each 1    vitamin D2 (ERGOCALCIFEROL) 44093 units (1250 mcg) capsule Take 1 capsule (50,000 Units) by mouth once a week 12 capsule 3    gabapentin (NEURONTIN) 100 MG capsule Take 1 capsule (100 mg) by mouth 3 times daily for 7 days 21 capsule 0       Medical History: any changes in medical history since they were last seen? No    Social History:   Home situation:  to Vandana, one son in late 20's   Occupation/Schooling: he used to work at [employer]. He is on disability, SSDI  Tobacco use: smokes 1/2 ppd, discussed smoking cessation today, he is not ready at present time  Alcohol use: none  Drug use: none  History of chemical dependency treatment: none    Is patient a current smoker or tobacco user?  Down to 3-4 cigarettes per day  If yes, was cessation counseling offered?  Yes          Physical Exam:  Vital signs: Blood pressure 126/81, pulse 57.    Behavioral observations:  Awake, alert, conversant and cooperative     Gait:  normal    Musculoskeletal exam:  strength grossly equal throughout    Abdominal: soft, flat. Well approximated healing long vertical abdominal incision. No erythema or drainage noted. Has some dry eschar along incision line.     Neuro exam:  deferred    Skin/vascular/autonomic:  No suspicious lesions on exposed skin.     Other:  na    Is the patient hypertensive today? no  Hypertensive on recheck of BP?   na  If yes, was patient recommended to see Primary Care Provider in follow up for management of HTN?  na               Diagnostic tests:  CT ABDOMEN PELVIS W CONTRAST  2/16/2019 " 3:00 AM      HISTORY: Right-sided abdominal pain, status post gastric bypass.  Patient has G-tube with blood and history of ulcers.     TECHNIQUE: CT abdomen and pelvis with 85 mL Isovue-370 IV. Radiation  dose for this scan was reduced using automated exposure control,  adjustment of the mA and/or kV according to patient size, or iterative  reconstruction technique.     COMPARISON: 1/13/2015.     FINDINGS:  Abdomen: There is mild dependent atelectasis at the lung bases. The  heart size is normal. Small hiatal hernia. There is mild biliary  dilatation into the pancreas head which is probably normal post  cholecystectomy. The liver otherwise appears normal. The gallbladder  is absent. The spleen, pancreas, adrenal glands and kidneys are normal  in appearance. There is a G-tube in place. No abdominal or pelvic  lymph node enlargement.     Pelvis: The ascending colon and transverse colon are very distended  with gas, stool and fluid. The descending and rectosigmoid colon are  nondilated. Transition point is in the region of the splenic flexure,  but there is no convincing obstruction. Small bowel is normal in  caliber. The appendix is normal. There is no free intraperitoneal gas  or fluid.                                                                      IMPRESSION:  1. The ascending and transverse colon are distended with gas, stool  and fluid. Distal colon is nondilated. No focal obstruction is  evident.  2. Small hiatal hernia.     IMANI HU MD        MR CERVICAL SPINE WITHOUT CONTRAST  1/2/2017  2:44 PM     HISTORY: Injury to neck 2 weeks ago with development of suspected  radicular pain to the left upper extremity with weakness of thumb and  index finger.     COMPARISON: Plain films 12/22/2016.     TECHNIQUE: Routine MR cervical spine extended through T2.     FINDINGS: Vertebral body bone marrow signal is normal and the  alignment is normal through T2. No malignant or destructive changes.  The cervical  and upper thoracic spinal cord appear intrinsically  normal through T2. Craniocervical junction region is normal. No  paraspinous soft tissue abnormality.     Findings by level as follows:     C2-C3: Negative. No disc protrusion. No central or lateral stenosis.     C3-C4: Negative.     C4-C5: Very tiny central disc protrusion. No significant central or  lateral stenosis.     C5-C6: Moderate degenerative narrowing of the interspace. Mild  broad-based disc osteophyte complex and small uncinate spurs. Minimal  central stenosis and minimal bilateral foraminal stenosis.     C6-C7: Moderate degenerative narrowing of the interspace. No disc  protrusion. No central or lateral stenosis.     C7-T1: Negative.                                                                      IMPRESSION:  1. Degenerative changes as described, most marked at C5-C6 and C6-C7.  2. No intrinsic cord abnormality through T2.     MARTHA MCCARTY MD           MR LUMBAR SPINE W/O & W CONTRAST 1/6/2019 3:49 PM     Provided History: Back pain, > 6wks conservative tx, persistent sx;  pain in the left paraspinal region- now with fungemia, persistent  blood stream infections since Oct 2018.     Comparison: No similar studies     Technique: Sagittal T1-weighted and T2-weighted and axial T2-weighted  images of the lumbar spine were obtained without intravenous contrast.  Post intravenous contrast using gadolinium axial and sagittal  T1-weighted images were obtained with fat saturation.     Contrast: 8.9CC GADAVIST     Findings: Regarding numbering convention, there are 5 lumbar-type  vertebrae assumed for the purposes of this dictation.  The tip of the  conus medullaris is at L1.  Regarding alignment, the lumbar vertebral  column appears normally aligned.  There is no significant disc height  narrowing at any level.  Regarding bone marrow signal intensity, no  abnormality is visualized on STIR images.     On a level by level basis:     L1-2: No significant  spinal canal or foraminal narrowing.     L2-3: Minimal disc bulge. Mild thickening of the ligamentum flavum. No  significant spinal canal or foraminal narrowing.     L3-4: Mild disc bulge and thickening of the ligamentum flavum with  mild spinal canal narrowing. No neural foraminal narrowing.     L4-5: Mild disc bulge and thickening of the ligamentum flavum. No  central spinal canal narrowing or neuroforaminal stenosis.      L5-S1: No significant spinal canal or foraminal narrowing.     3 bandlike areas of high STIR signal and enhancement in the right  posterior paraspinous muscles.                                                                      Impression:  1. No abnormal signal within the spine to suggest fungal infection.  Mild nonspecific enhancement and STIR hyperintensity in the right  posterior paraspinous muscles, potentially myositis.  2. Multilevel lumbar spondylosis.     I have personally reviewed the examination and initial interpretation  and I agree with the findings.     YOLA TELLEZ MD           Other testing (labs, diagnostics):  5/25/2022  Cr. 0.69  Est GFR >90        Screening tools:      DIRE Score for ongoing opioid management is calculated as follows:     Diagnosis = 2     Intractability = 2     Risk: Psych = 3  Chem Hlth = 2  Reliability = 2  Social = 2     Efficacy = 2     Total DIRE Score = 15 (14 or higher predicts good candidate for ongoing opioid management; 13 or lower predicts poor candidate for opioid management)         [place] Prescription Monitoring [place]:  [place]ed [place]  11/1/2023- no concerning fills.  Natalie MENARD, RN CNP, FNP  [facility]  [place] location          Assessment:   Visceral hyperalgesia  Chronic abdominal pain  Neuropathic pain  Chronic bilateral low back pain without sciatica  Chronic pain syndrome  Chronic continuous use of opioids  Encounter for long term use of opiate analgesic (current use)    CSA 11/1/2023  UDT  11/1/2023  Long term current use of opiate analgesic  PMHx includes: ADHD, anxiety, cardiomyopathy and nutritional diseases, chronic abdominal pain, central line associated bloodstream infection recurrent, anesthesia complication, difficulty swallowing, gastric ulcer unspecified as acute or chronic without mention of hemorrhage perforation or obstruction, gastroesophageal reflux disease, head injury, hiatal hernia, other bladder disorder, other chronic pain, postop nausea and vomiting, severe malnutrition on TPN, short gut syndrome, tobacco abuse  PSHx includes: Appendectomy, back surgery (11/3/2014), biopsy of cervical lymph node (2/20/2015), cholecystectomy, colonoscopy (2021, 2022), cervical anterior 1 level discectomy and fusion (2/15/2017), endoscopic insertion tube gastrostomy (9/9/2013), endoscopic ultrasound upper gastrointestinal tract (4/29/2011), endoscopic ultrasound upper gastrointestinal tract (9/9/2013), endoscopic ultrasound upper gastrointestinal tract (2/24/2021), endoscopy (3/25/2011, 8/4/2009, 1/5/2009), multiple EGDs, gastrectomy (6/22/2012), gastrojejunostomy (8/26/2009), umbilical hernia repair (2006), hernia raphe hiatal (6/22/2012), herniorrhaphy inguinal (11/22/2013), insert PICC line on the right (12/19/2019), insert PICC line right (2/21/2020), insert vascular access port on the right (12/19/2017, 8/2/2018), multiple interventional radiology CVC tunnel placement and removals, exploratory laparotomy (11/22/2013), orthopedic surgery, Celestino-en-Y gastric bypass (2003), tonsillectomy.        Plan:   Physical Therapy: none  Clinical Health Psychologist to address issues of relaxation, behavioral change, coping style, and other factors important to improvement: none  Continue gentle movement at home  Diagnostic Studies: none  Medication Management:   Continue fentanyl patch 25mcg/hr every 48 hours, fill 11/27 and start 11/29  Oxycodone liquid 5mg/5ml,  use 5-10mg (5-10mls) every 4 hours as  needed, max of 40mg (40ml) per day. Fill 11/27 and start 11/29  START Lyrica 25mg as directed.   Further procedures recommended: none  Recommendations/follow-up for PCP:  See above  Release of information: none  UDT today, last took oxycodone at 0800 today and is wearing the fentanyl patch  Signed CSA today  Follow up: 12 weeks for in-person or video visit. Please call [phone] to make your follow-up appointment with me.       ASSESSMENT AND PLAN:  (R19.8) Visceral hyperalgesia  (primary encounter diagnosis)  Plan: fentaNYL (DURAGESIC) 25 mcg/hr 72 hr patch,         oxyCODONE (ROXICODONE) 5 MG/5ML solution,         pregabalin (LYRICA) 25 MG capsule, Adult Pain         Clinic Follow-Up Order            (R10.9,  G89.29) Chronic abdominal pain  Plan: fentaNYL (DURAGESIC) 25 mcg/hr 72 hr patch,         oxyCODONE (ROXICODONE) 5 MG/5ML solution, Adult        Pain Clinic Follow-Up Order            (M79.2) Neuropathic pain  Plan: pregabalin (LYRICA) 25 MG capsule, Adult Pain         Clinic Follow-Up Order          (M54.50,  G89.29) Chronic bilateral low back pain without sciatica  Plan: fentaNYL (DURAGESIC) 25 mcg/hr 72 hr patch,         oxyCODONE (ROXICODONE) 5 MG/5ML solution, Adult        Pain Clinic Follow-Up Order            (G89.4) Chronic pain syndrome  Plan: fentaNYL (DURAGESIC) 25 mcg/hr 72 hr patch,         oxyCODONE (ROXICODONE) 5 MG/5ML solution, Adult        Pain Clinic Follow-Up Order            (F11.90) Chronic, continuous use of opioids  Plan: Drug Confirmation Panel Urine with Creat,         Ethanol urine, Ethyl Glucuronide Screen with         Reflex to Confirmation, Urine, fentaNYL         (DURAGESIC) 25 mcg/hr 72 hr patch, oxyCODONE         (ROXICODONE) 5 MG/5ML solution, Adult Pain         Clinic Follow-Up Order       (Z79.891) Encounter for long-term use of opiate analgesic  Plan: Drug Confirmation Panel Urine with Creat,         Ethanol urine, Ethyl Glucuronide Screen with         Reflex to  Confirmation, Urine, Adult Pain         Clinic Follow-Up Order              BILLING TIME DOCUMENTATION:   TOTAL TIME includes:   Time spent preparing to see the patient: 5 minutes (reviewing records and tests)  Time spend face to face with the patient: 20 minutes  Time spent ordering tests, medications, procedures and referrals: 0 minutes  Time spent Referring and communicating with other healthcare professionals: 0 minutes  Documenting clinical information in Epic: 7 minutes    The total TIME spent on this patient on the day of the appointment was 32 minutes.            Natalie MENARD, RN CNP, FNP  [facility]  [facility] [place]

## 2023-11-01 NOTE — TELEPHONE ENCOUNTER
Ok for labs to be done here if standing orders.     Needs picc line bandage changed, can we set that up in clinic?  I don't think infusion will do this. He either needs RN here or homecare nurse to do it.

## 2023-11-01 NOTE — PROGRESS NOTES
This is a recent snapshot of the patient's [facility] medical record.  For current drug dose and complete information and questions, call [phone] or In [facility] pool, fv home infusion ([id number])  CSN Number:  [id number]       No

## 2023-11-01 NOTE — PATIENT INSTRUCTIONS
----------------------------------------------------------------  [facility] Number:  [phone]   Call with any questions about your care and for scheduling assistance.   Calls are returned Monday through Friday between 8 AM and 4:30 PM. We usually get back to you within 2 business days depending on the issue/request.    If we are prescribing your medications:  For opioid medication refills, call the clinic or send a [address] message 7 days in advance.  Please include:  Name of requested medication  Name of the pharmacy.  For non-opioid medications, call your pharmacy directly to request a refill. Please allow 3-4 days to be processed.   Per [place] State Law:  All controlled substance prescriptions must be filled within 30 days of being written.    For those controlled substances allowing refills, pickup must occur within 30 days of last fill.      We believe regular attendance is key to your success in our program!    Any time you are unable to keep your appointment we ask that you call us at least 24 hours in advance to cancel.This will allow us to offer the appointment time to another patient.   Multiple missed appointments may lead to dismissal from the clinic.

## 2023-11-01 NOTE — LETTER
Opioid / Opioid Plus Controlled Substance Agreement    This is an agreement between you and your provider about the safe and appropriate use of controlled substance/opioids prescribed by your care team. Controlled substances are medicines that can cause physical and mental dependence (abuse).    There are strict laws about having and using these medicines. We here at [facility] are committing to working with you in your efforts to get better. To support you in this work, we ll help you schedule regular office appointments for medicine refills. If we must cancel or change your appointment for any reason, we ll make sure you have enough medicine to last until your next appointment.     As a Provider, I will:  Listen carefully to your concerns and treat you with respect.   Recommend a treatment plan that I believe is in your best interest. This plan may involve therapies other than opioid pain medication.   Talk with you often about the possible benefits, and the risk of harm of any medicine that we prescribe for you.   Provide a plan on how to taper (discontinue or go off) using this medicine if the decision is made to stop its use.    As a Patient, I understand that opioid(s):   Are a controlled substance prescribed by my care team to help me function or work and manage my condition(s).   Are strong medicines and can cause serious side effects such as:  Drowsiness, which can seriously affect my driving ability  A lower breathing rate, enough to cause death  Harm to my thinking ability   Depression   Abuse of and addiction to this medicine  Need to be taken exactly as prescribed. Combining opioids with certain medicines or chemicals (such as illegal drugs, sedatives, sleeping pills, and benzodiazepines) can be dangerous or even fatal. If I stop opioids suddenly, I may have severe withdrawal symptoms.  Do not work for all types of pain nor for all patients. If they re not helpful, I may be asked to stop  them.        The risks, benefits and side effects of these medicine(s) were explained to me. I agree that:  I will take part in other treatments as advised by my care team. This may be psychiatry or counseling, physical therapy, behavioral therapy, group treatment or a referral to a specialist.     I will keep all my appointments. I understand that this is part of the monitoring of opioids. My care team may require an office visit for EVERY opioid/controlled substance refill. If I miss appointments or don t follow instructions, my care team may stop my medicine.    I will take my medicines as prescribed. I will not change the dose or schedule unless my care team tells me to. There will be no refills if I run out early.     I may be asked to come to the clinic and complete a urine drug test or complete a pill count at any time. If I don t give a urine sample or participate in a pill count, the care team may stop my medicine.    I will only receive prescriptions from this clinic for chronic pain. If I am treated by another provider for acute pain issues, I will tell them that I am taking opioid pain medication for chronic pain and that I have a treatment agreement with this provider. I will inform my [facility] care team within one business day if I am given a prescription for any pain medication by another healthcare provider. My [facility] care team can contact other providers and pharmacists about my use of any medicines.    It is up to me to make sure that I don t run out of my medicines on weekends or holidays. If my care team is willing to refill my opioid prescription without a visit, I must request refills only during office hours. Refills may take up to 3 business days to process. I will use one pharmacy to fill all my opioid and other controlled substance prescriptions. I will notify the clinic about any changes to my insurance or medication availability.    I am responsible for my  prescriptions. If the medicine/prescription is lost, stolen or destroyed, it will not be replaced. I also agree not to share controlled substance medicines with anyone.    I am aware I should not use any illegal or recreational drugs. I agree not to drink alcohol unless my care team says I can.       If I enroll in the [place] [facility] Cann[facility] program, I will tell my care team prior to my next refill.     I will tell my care team right away if I become pregnant, have a new medical problem treated outside of my regular clinic, or have a change in my medications.    I understand that this medicine can affect my thinking, judgment and reaction time. Alcohol and drugs affect the brain and body, which can affect the safety of my driving. Being under the influence of alcohol or drugs can affect my decision-making, behaviors, personal safety, and the safety of others. Driving while impaired (DWI) can occur if a person is driving, operating, or in physical control of a car, motorcycle, boat, snowmobile, ATV, motorbike, off-road vehicle, or any other motor vehicle ([place] Statute 169A.20). I understand the risk if I choose to drive or operate any vehicle or machinery.    I understand that if I do not follow any of the conditions above, my prescriptions or treatment may be stopped or changed.          Opioids  What You Need to Know    What are opioids?   Opioids are pain medicines that must be prescribed by a doctor. They are also known as narcotics.     Examples are:   morphine (MS Contin, Do)  oxycodone (Oxycontin)  oxycodone and acetaminophen (Percocet)  hydrocodone and acetaminophen (Vicodin, Norco)   fentanyl patch (Duragesic)   hydromorphone (Dilaudid)   methadone  codeine (Tylenol #3)     What do opioids do well?   Opioids are best for severe short-term pain such as after a surgery or injury. They may work well for cancer pain. They may help some people with long-lasting (chronic) pain.     What do opioids NOT do  well?   Opioids never get rid of pain entirely, and they don t work well for most patients with chronic pain. Opioids don t reduce swelling, one of the causes of pain.                                    Other ways to manage chronic pain and improve function include:     Treat the health problem that may be causing pain  Anti-inflammation medicines, which reduce swelling and tenderness, such as ibuprofen (Advil, Motrin) or naproxen (Aleve)  Acetaminophen (Tylenol)  Antidepressants and anti-seizure medicines, especially for nerve pain  Topical treatments such as patches or creams  Injections or nerve blocks  Chiropractic or osteopathic treatment  Acupuncture, massage, deep breathing, meditation, visual imagery, aromatherapy  Use heat or ice at the pain site  Physical therapy   Exercise  Stop smoking  Take part in therapy       Risks and side effects     Talk to your doctor before you start or decide to keep taking opioids. Possible side effects include:    Lowering your breathing rate enough to cause death  Overdose, including death, especially if taking higher than prescribed doses  Worse depression symptoms; less pleasure in things you usually enjoy  Feeling tired or sluggish  Slower thoughts or cloudy thinking  Being more sensitive to pain over time; pain is harder to control  Trouble sleeping or restless sleep  Changes in hormone levels (for example, less testosterone)  Changes in sex drive or ability to have sex  Constipation  Unsafe driving  Itching and sweating  Dizziness  Nausea, throwing up and dry mouth    What else should I know about opioids?    Opioids may lead to dependence, tolerance, or addiction.    Dependence means that if you stop or reduce the medicine too quickly, you will have withdrawal symptoms. These include loose poop (diarrhea), jitters, flu-like symptoms, nervousness and tremors. Dependence is not the same as addiction.                     Tolerance means needing higher doses over time to  get the same effect. This may increase the chance of serious side effects.    Addiction is when people improperly use a substance that harms their body, their mind or their relations with others. Use of opiates can cause a relapse of addiction if you have a history of drug or alcohol abuse.    People who have used opioids for a long time may have a lower quality of life, worse depression, higher levels of pain and more visits to doctors.    You can overdose on opioids. Take these steps to lower your risk of overdose:    Recognize the signs:  Signs of overdose include decrease or loss of consciousness (blackout), slowed breathing, trouble waking up and blue lips. If someone is worried about overdose, they should call 911.    Talk to your doctor about Narcan (naloxone).   If you are at risk for overdose, you may be given a prescription for Narcan. This medicine very quickly reverses the effects of opioids.   If you overdose, a friend or family member can give you Narcan while waiting for the ambulance. They need to know the signs of overdose and how to give Narcan.     Don't use alcohol or street drugs.   Taking them with opioids can cause death.    Do not take any of these medicines unless your doctor says it s OK. Taking these with opioids can cause death:  Benzodiazepines, such as lorazepam (Ativan), alprazolam (Xanax) or diazepam (Valium)  Muscle relaxers, such as cyclobenzaprine (Flexeril)  Sleeping pills like zolpidem (Ambien)   Other opioids      How to keep you and other people safe while taking opioids:    Never share your opioids with others.  Opioid medicines are regulated by the Drug Enforcement Agency ([employer]). Selling or sharing medications is a criminal act.    2. Be sure to store opioids in a secure place, locked up if possible. Young children can easily swallow them and overdose.    3. When you are traveling with your medicines, keep them in the original bottles. If you use a pill box, be sure you also  carry a copy of your medicine list from your clinic or pharmacy.    4. Safe disposal of opioids    Most pharmacies have places to get rid of medicine, called disposal kiosks. Medicine disposal options are also available in every [facility]. Search your county and  medication disposal  to find more options. You can find more details at:  https://www.pca.[email or web address]./living-green/managing-unwanted-medications     I agree that my provider, clinic care team, and pharmacy may work with any city, state or federal law enforcement agency that investigates the misuse, sale, or other diversion of my controlled medicine. I will allow my provider to discuss my care with, or share a copy of, this agreement with any other treating provider, pharmacy or emergency room where I receive care.    I have read this agreement and have asked questions about anything I did not understand.    _______________________________________________________  Patient Signature - Parker Acevedo _____________________                   Date     _______________________________________________________  Provider Signature - SAILAJA Johnson CNP   _____________________                   Date     _______________________________________________________  Witness Signature (required if provider not present while patient signing)   _____________________                   Date

## 2023-11-01 NOTE — NURSING NOTE
Pt s/p abdominal surgery.    Per the 10/19/23 post-op visit pt's abdominal staples were removed.  There was 1 staple remaining that had been missed as it was scabbed over.    Area cleaned w/ chlorhexidine.  Writer then removed the 1 remaining staple w/out issue.      The area is CD & I.   normal redness to area around incision.    Demetrice RN-BSN  [facility]   [phone]

## 2023-11-02 ENCOUNTER — LAB REQUISITION (OUTPATIENT)
Dept: LAB | Facility: CLINIC | Age: 53
End: 2023-11-02
Payer: COMMERCIAL

## 2023-11-02 DIAGNOSIS — R13.10 DYSPHAGIA, UNSPECIFIED: ICD-10-CM

## 2023-11-02 LAB
BASOPHILS # BLD AUTO: 0.1 10E3/UL (ref 0–0.2)
BASOPHILS NFR BLD AUTO: 0 %
CREAT UR-MCNC: 159 MG/DL
CRP SERPL-MCNC: <3 MG/L
EOSINOPHIL # BLD AUTO: 0.1 10E3/UL (ref 0–0.7)
EOSINOPHIL NFR BLD AUTO: 1 %
ERYTHROCYTE [DISTWIDTH] IN BLOOD BY AUTOMATED COUNT: 18.6 % (ref 10–15)
ETHANOL UR QL SCN: NORMAL
FERRITIN SERPL-MCNC: 11 NG/ML (ref 31–409)
HCT VFR BLD AUTO: 30.6 % (ref 40–53)
HGB BLD-MCNC: 9.4 G/DL (ref 13.3–17.7)
IMM GRANULOCYTES # BLD: 0.1 10E3/UL
IMM GRANULOCYTES NFR BLD: 0 %
LYMPHOCYTES # BLD AUTO: 1.1 10E3/UL (ref 0.8–5.3)
LYMPHOCYTES NFR BLD AUTO: 10 %
MCH RBC QN AUTO: 26.3 PG (ref 26.5–33)
MCHC RBC AUTO-ENTMCNC: 30.7 G/DL (ref 31.5–36.5)
MCV RBC AUTO: 86 FL (ref 78–100)
MONOCYTES # BLD AUTO: 1 10E3/UL (ref 0–1.3)
MONOCYTES NFR BLD AUTO: 9 %
NEUTROPHILS # BLD AUTO: 9.3 10E3/UL (ref 1.6–8.3)
NEUTROPHILS NFR BLD AUTO: 80 %
NRBC # BLD AUTO: 0 10E3/UL
NRBC BLD AUTO-RTO: 0 /100
PLATELET # BLD AUTO: 268 10E3/UL (ref 150–450)
RBC # BLD AUTO: 3.58 10E6/UL (ref 4.4–5.9)
WBC # BLD AUTO: 11.6 10E3/UL (ref 4–11)

## 2023-11-02 PROCEDURE — 84100 ASSAY OF PHOSPHORUS: CPT | Performed by: SURGERY

## 2023-11-02 PROCEDURE — 84478 ASSAY OF TRIGLYCERIDES: CPT | Performed by: SURGERY

## 2023-11-02 PROCEDURE — 82525 ASSAY OF COPPER: CPT | Performed by: SURGERY

## 2023-11-02 PROCEDURE — 84630 ASSAY OF ZINC: CPT | Performed by: SURGERY

## 2023-11-02 PROCEDURE — 82728 ASSAY OF FERRITIN: CPT | Performed by: SURGERY

## 2023-11-02 PROCEDURE — 83735 ASSAY OF MAGNESIUM: CPT | Performed by: SURGERY

## 2023-11-02 PROCEDURE — 85014 HEMATOCRIT: CPT | Performed by: SURGERY

## 2023-11-02 PROCEDURE — 82248 BILIRUBIN DIRECT: CPT | Performed by: SURGERY

## 2023-11-02 PROCEDURE — 82310 ASSAY OF CALCIUM: CPT | Performed by: SURGERY

## 2023-11-02 PROCEDURE — 84255 ASSAY OF SELENIUM: CPT | Performed by: SURGERY

## 2023-11-02 PROCEDURE — 86140 C-REACTIVE PROTEIN: CPT | Performed by: SURGERY

## 2023-11-02 PROCEDURE — 82374 ASSAY BLOOD CARBON DIOXIDE: CPT | Performed by: SURGERY

## 2023-11-02 NOTE — TELEPHONE ENCOUNTER
Per fuad patient's home care can do the dressing changes, he just needs blood drawn from his centrla line.    Genesis Ardon RN on 11/2/2023 at 11:25 AM

## 2023-11-03 LAB
ALBUMIN SERPL BCG-MCNC: 3.7 G/DL (ref 3.5–5.2)
ALP SERPL-CCNC: 75 U/L (ref 40–129)
ALT SERPL W P-5'-P-CCNC: 11 U/L (ref 0–70)
ANION GAP SERPL CALCULATED.3IONS-SCNC: 9 MMOL/L (ref 7–15)
AST SERPL W P-5'-P-CCNC: 18 U/L (ref 0–45)
BILIRUB DIRECT SERPL-MCNC: <0.2 MG/DL (ref 0–0.3)
BILIRUB SERPL-MCNC: 0.2 MG/DL
BUN SERPL-MCNC: 13 MG/DL (ref 6–20)
CALCIUM SERPL-MCNC: 8.4 MG/DL (ref 8.6–10)
CHLORIDE SERPL-SCNC: 102 MMOL/L (ref 98–107)
CREAT SERPL-MCNC: 0.77 MG/DL (ref 0.67–1.17)
DEPRECATED HCO3 PLAS-SCNC: 27 MMOL/L (ref 22–29)
EGFRCR SERPLBLD CKD-EPI 2021: >90 ML/MIN/1.73M2
ETHYL GLUCURONIDE UR QL SCN: NEGATIVE NG/ML
FASTING STATUS PATIENT QL REPORTED: NORMAL
GLUCOSE SERPL-MCNC: 85 MG/DL (ref 70–99)
MAGNESIUM SERPL-MCNC: 1.9 MG/DL (ref 1.7–2.3)
PHOSPHATE SERPL-MCNC: 3.4 MG/DL (ref 2.5–4.5)
POTASSIUM SERPL-SCNC: 4.1 MMOL/L (ref 3.4–5.3)
PROT SERPL-MCNC: 6.7 G/DL (ref 6.4–8.3)
SODIUM SERPL-SCNC: 138 MMOL/L (ref 135–145)
TRIGL SERPL-MCNC: 74 MG/DL

## 2023-11-03 NOTE — TELEPHONE ENCOUNTER
His orders are from Dr Vyas his surgeon at the , that should be in Epic and ok to use.  I won't order this labs because then results come to me and need to go to the surgeon.

## 2023-11-05 LAB
COPPER SERPL-MCNC: 135.5 UG/DL
SELENIUM SERPL-MCNC: 111 UG/L
ZINC SERPL-MCNC: 61 UG/DL

## 2023-11-06 NOTE — TELEPHONE ENCOUNTER
Spoke with Rose, spouse, and informed her that infusion will do patient's lab draws and dressing changes with Dr. Vyas's orders.  Rose verbalized understanding.    She plans to contact Dr. Vyas's office regarding patient's current symptoms and to get orders sent to [facility] for lab draws and dressing changes.

## 2023-11-07 ENCOUNTER — MYC REFILL (OUTPATIENT)
Dept: PALLIATIVE MEDICINE | Facility: CLINIC | Age: 53
End: 2023-11-07
Payer: COMMERCIAL

## 2023-11-07 ENCOUNTER — MYC REFILL (OUTPATIENT)
Dept: INTERNAL MEDICINE | Facility: CLINIC | Age: 53
End: 2023-11-07
Payer: COMMERCIAL

## 2023-11-07 ENCOUNTER — MYC MEDICAL ADVICE (OUTPATIENT)
Dept: SURGERY | Facility: CLINIC | Age: 53
End: 2023-11-07
Payer: COMMERCIAL

## 2023-11-07 DIAGNOSIS — B37.2 YEAST INFECTION OF THE SKIN: ICD-10-CM

## 2023-11-07 DIAGNOSIS — F90.0 ADHD (ATTENTION DEFICIT HYPERACTIVITY DISORDER), INATTENTIVE TYPE: ICD-10-CM

## 2023-11-07 DIAGNOSIS — F11.90 CHRONIC, CONTINUOUS USE OF OPIOIDS: ICD-10-CM

## 2023-11-07 DIAGNOSIS — F41.9 CHRONIC ANXIETY: ICD-10-CM

## 2023-11-07 LAB
FENTANYL UR CFM-MCNC: 8 NG/ML
FENTANYL/CREAT UR: 5 NG/MG {CREAT}
GABAPENTIN UR QL CFM: PRESENT
NORFENTANYL UR CFM-MCNC: 104 NG/ML
NORFENTANYL/CREAT UR: 65 NG/MG {CREAT}
OXYCODONE UR CFM-MCNC: 2840 NG/ML
OXYCODONE/CREAT UR: 1786 NG/MG {CREAT}
OXYMORPHONE UR CFM-MCNC: >7000 NG/ML
OXYMORPHONE/CREAT UR: ABNORMAL

## 2023-11-07 NOTE — RESULT ENCOUNTER NOTE
Urine drug testing as expected. No illicit medication or alcohol noted. Continue standard monitoring while on opiates.   Natalie MENARD RN CNP, FNP  [facility] [place]

## 2023-11-07 NOTE — TELEPHONE ENCOUNTER
Patient started having fevers 2 days ago when he was getting his home TPN/hydration infusion. Temperature was 100.6. Otherwise when not having infusions he is afebrile. Patient states he feels fine, however he does feel cruddy after his home TPN infusions for about 12 hours.   Patient is refusing to go to the ED.   Patient requesting blood culture orders as he would like to have these drawn tomorrow at infusion center [facility].   Will notify Dr Vyas of above.

## 2023-11-07 NOTE — RESULT ENCOUNTER NOTE
No evidence for recent alcohol ingestion while on opiate medication. This is as expected.   Natalie MENARD, RN CNP, FNP  [facility]

## 2023-11-08 RX ORDER — ALPRAZOLAM 0.5 MG
0.5 TABLET ORAL 2 TIMES DAILY PRN
Qty: 60 TABLET | Refills: 0 | Status: SHIPPED | OUTPATIENT
Start: 2023-11-08 | End: 2023-12-09

## 2023-11-08 RX ORDER — DEXTROAMPHETAMINE SACCHARATE, AMPHETAMINE ASPARTATE, DEXTROAMPHETAMINE SULFATE AND AMPHETAMINE SULFATE 5; 5; 5; 5 MG/1; MG/1; MG/1; MG/1
TABLET ORAL
Qty: 30 TABLET | Refills: 0 | Status: SHIPPED | OUTPATIENT
Start: 2023-11-08 | End: 2023-12-09

## 2023-11-08 RX ORDER — NYSTATIN 100000 U/G
CREAM TOPICAL 2 TIMES DAILY
Qty: 30 G | Refills: 1 | Status: SHIPPED | OUTPATIENT
Start: 2023-11-08 | End: 2024-06-15

## 2023-11-08 NOTE — TELEPHONE ENCOUNTER
Left message on VM for patient that Dr Vyas would like him to come to the ED regarding his temp for a work up. Also sent a Mychart regarding this.

## 2023-11-08 NOTE — TELEPHONE ENCOUNTER
11/7/23  5:11 PM  Refills have been requested for the following medications:         polyethylene glycol (MIRALAX) 17 GM/Dose powder [Natalie Hull]     Preferred pharmacy: [address]

## 2023-11-08 NOTE — TELEPHONE ENCOUNTER
Received fax request from [facility] requesting refill(s) for     polyethylene glycol (MIRALAX) 17 GM/Dose powder       Pt last seen on 11/01/23  Next appt scheduled for None    Will facilitate refill.

## 2023-11-09 RX ORDER — POLYETHYLENE GLYCOL 3350 17 G/17G
17 POWDER, FOR SOLUTION ORAL DAILY
Qty: 1020 G | Refills: 3 | Status: SHIPPED | OUTPATIENT
Start: 2023-11-09 | End: 2024-01-20

## 2023-11-09 NOTE — TELEPHONE ENCOUNTER
Signed Prescriptions:                        Disp   Refills    polyethylene glycol (MIRALAX) 17 GM/Dose p*1020 g 3        Sig: Take 17 g by mouth daily  Authorizing Provider: NOMAN MCDOWELL, RN CNP, FNP  [facility]  [facility] [place]

## 2023-11-10 ENCOUNTER — PATIENT OUTREACH (OUTPATIENT)
Dept: ENDOCRINOLOGY | Facility: CLINIC | Age: 53
End: 2023-11-10
Payer: COMMERCIAL

## 2023-11-10 ENCOUNTER — TELEPHONE (OUTPATIENT)
Dept: ENDOCRINOLOGY | Facility: CLINIC | Age: 53
End: 2023-11-10
Payer: COMMERCIAL

## 2023-11-10 NOTE — PROGRESS NOTES
Notification from [facility] Pharmacist.  Patient continues to report fevers but has not gone to ED as requested by RN 10/8/23.  Spoke with patient's wife and instructed her to take patient to ED for evaluation. Patient will not have fluids delivered until patient has been assessed and it has been determined that fevers are not from a line infection.  Patient's wife voiced understanding.  States she has been trying to get  to agree to go to ED.  Dr. Vyas notified.

## 2023-11-10 NOTE — TELEPHONE ENCOUNTER
Reason for Call: Request for an order or referral:    Order or referral being requested: Verbal Order needed for homecare to continue skilled nursing 1x per week x9 weeks.    Date needed: as soon as possible    Has the patient been seen by the PCP for this problem? Not Applicable    Additional comments: Please leave name & title if leaving message     Phone number Patient can be reached at:  Other phone number:  [phone]*    Best Time:  any    Can we leave a detailed message on this number?  YES    Call taken on 11/10/2023 at 8:45 AM by Estelle Woodward

## 2023-11-11 ENCOUNTER — APPOINTMENT (OUTPATIENT)
Dept: GENERAL RADIOLOGY | Facility: CLINIC | Age: 53
End: 2023-11-11
Attending: FAMILY MEDICINE
Payer: COMMERCIAL

## 2023-11-11 ENCOUNTER — HOSPITAL ENCOUNTER (EMERGENCY)
Facility: CLINIC | Age: 53
Discharge: HOME | End: 2023-11-11
Attending: FAMILY MEDICINE | Admitting: FAMILY MEDICINE
Payer: COMMERCIAL

## 2023-11-11 ENCOUNTER — TELEPHONE (OUTPATIENT)
Dept: EMERGENCY MEDICINE | Facility: CLINIC | Age: 53
End: 2023-11-11

## 2023-11-11 VITALS
HEART RATE: 74 BPM | TEMPERATURE: 98.1 F | RESPIRATION RATE: 18 BRPM | WEIGHT: 181 LBS | HEIGHT: 71 IN | DIASTOLIC BLOOD PRESSURE: 93 MMHG | OXYGEN SATURATION: 99 % | BODY MASS INDEX: 25.34 KG/M2 | SYSTOLIC BLOOD PRESSURE: 129 MMHG

## 2023-11-11 DIAGNOSIS — R50.9 FEVER IN ADULT: ICD-10-CM

## 2023-11-11 DIAGNOSIS — R93.89 ABNORMAL FINDING ON CHEST XRAY: ICD-10-CM

## 2023-11-11 LAB
ACINETOBACTER SPECIES: NOT DETECTED
ALBUMIN UR-MCNC: NEGATIVE MG/DL
ANION GAP SERPL CALCULATED.3IONS-SCNC: 10 MMOL/L (ref 7–15)
APPEARANCE UR: CLEAR
BASOPHILS # BLD AUTO: 0.1 10E3/UL (ref 0–0.2)
BASOPHILS NFR BLD AUTO: 1 %
BILIRUB UR QL STRIP: NEGATIVE
BUN SERPL-MCNC: 16.2 MG/DL (ref 6–20)
CALCIUM SERPL-MCNC: 8.6 MG/DL (ref 8.6–10)
CHLORIDE SERPL-SCNC: 104 MMOL/L (ref 98–107)
CITROBACTER SPECIES: NOT DETECTED
COLOR UR AUTO: YELLOW
CREAT SERPL-MCNC: 0.75 MG/DL (ref 0.67–1.17)
CRP SERPL-MCNC: 11.39 MG/L
CTX-M: NOT DETECTED
DEPRECATED HCO3 PLAS-SCNC: 25 MMOL/L (ref 22–29)
EGFRCR SERPLBLD CKD-EPI 2021: >90 ML/MIN/1.73M2
ENTEROBACTER SPECIES: NOT DETECTED
EOSINOPHIL # BLD AUTO: 0.2 10E3/UL (ref 0–0.7)
EOSINOPHIL NFR BLD AUTO: 3 %
ERYTHROCYTE [DISTWIDTH] IN BLOOD BY AUTOMATED COUNT: 18.4 % (ref 10–15)
ESCHERICHIA COLI: NOT DETECTED
FLUAV RNA SPEC QL NAA+PROBE: NEGATIVE
FLUBV RNA RESP QL NAA+PROBE: NEGATIVE
GLUCOSE SERPL-MCNC: 107 MG/DL (ref 70–99)
GLUCOSE UR STRIP-MCNC: NEGATIVE MG/DL
HCT VFR BLD AUTO: 32.9 % (ref 40–53)
HGB BLD-MCNC: 10 G/DL (ref 13.3–17.7)
HGB UR QL STRIP: NEGATIVE
IMM GRANULOCYTES # BLD: 0 10E3/UL
IMM GRANULOCYTES NFR BLD: 0 %
IMP: NOT DETECTED
KETONES UR STRIP-MCNC: 5 MG/DL
KLEBSIELLA OXYTOCA: NOT DETECTED
KLEBSIELLA PNEUMONIAE: DETECTED
KPC: NOT DETECTED
LACTATE SERPL-SCNC: 0.8 MMOL/L (ref 0.7–2)
LEUKOCYTE ESTERASE UR QL STRIP: NEGATIVE
LYMPHOCYTES # BLD AUTO: 2.9 10E3/UL (ref 0.8–5.3)
LYMPHOCYTES NFR BLD AUTO: 40 %
MCH RBC QN AUTO: 25.6 PG (ref 26.5–33)
MCHC RBC AUTO-ENTMCNC: 30.4 G/DL (ref 31.5–36.5)
MCV RBC AUTO: 84 FL (ref 78–100)
MONOCYTES # BLD AUTO: 0.7 10E3/UL (ref 0–1.3)
MONOCYTES NFR BLD AUTO: 10 %
MUCOUS THREADS #/AREA URNS LPF: PRESENT /LPF
NDM: NOT DETECTED
NEUTROPHILS # BLD AUTO: 3.4 10E3/UL (ref 1.6–8.3)
NEUTROPHILS NFR BLD AUTO: 46 %
NITRATE UR QL: NEGATIVE
NRBC # BLD AUTO: 0 10E3/UL
NRBC BLD AUTO-RTO: 0 /100
OXA (DETECTED/NOT DETECTED): NOT DETECTED
PH UR STRIP: 6 [PH] (ref 5–7)
PLATELET # BLD AUTO: 275 10E3/UL (ref 150–450)
POTASSIUM SERPL-SCNC: 4.2 MMOL/L (ref 3.4–5.3)
PROCALCITONIN SERPL IA-MCNC: 1.46 NG/ML
PROTEUS SPECIES: NOT DETECTED
PSEUDOMONAS AERUGINOSA: NOT DETECTED
RBC # BLD AUTO: 3.91 10E6/UL (ref 4.4–5.9)
RBC URINE: 2 /HPF
RSV RNA SPEC NAA+PROBE: NEGATIVE
SARS-COV-2 RNA RESP QL NAA+PROBE: NEGATIVE
SODIUM SERPL-SCNC: 139 MMOL/L (ref 135–145)
SP GR UR STRIP: 1.02 (ref 1–1.03)
UROBILINOGEN UR STRIP-MCNC: 4 MG/DL
VIM: NOT DETECTED
WBC # BLD AUTO: 7.4 10E3/UL (ref 4–11)
WBC URINE: 1 /HPF

## 2023-11-11 PROCEDURE — 83605 ASSAY OF LACTIC ACID: CPT | Performed by: FAMILY MEDICINE

## 2023-11-11 PROCEDURE — 99284 EMERGENCY DEPT VISIT MOD MDM: CPT | Performed by: FAMILY MEDICINE

## 2023-11-11 PROCEDURE — 36415 COLL VENOUS BLD VENIPUNCTURE: CPT | Performed by: FAMILY MEDICINE

## 2023-11-11 PROCEDURE — 84145 PROCALCITONIN (PCT): CPT | Performed by: FAMILY MEDICINE

## 2023-11-11 PROCEDURE — 80048 BASIC METABOLIC PNL TOTAL CA: CPT | Performed by: FAMILY MEDICINE

## 2023-11-11 PROCEDURE — 71046 X-RAY EXAM CHEST 2 VIEWS: CPT

## 2023-11-11 PROCEDURE — 87637 SARSCOV2&INF A&B&RSV AMP PRB: CPT | Performed by: FAMILY MEDICINE

## 2023-11-11 PROCEDURE — 85025 COMPLETE CBC W/AUTO DIFF WBC: CPT | Performed by: FAMILY MEDICINE

## 2023-11-11 PROCEDURE — 87149 DNA/RNA DIRECT PROBE: CPT | Performed by: FAMILY MEDICINE

## 2023-11-11 PROCEDURE — 99284 EMERGENCY DEPT VISIT MOD MDM: CPT | Mod: 25

## 2023-11-11 PROCEDURE — 81001 URINALYSIS AUTO W/SCOPE: CPT | Performed by: FAMILY MEDICINE

## 2023-11-11 PROCEDURE — 86140 C-REACTIVE PROTEIN: CPT | Performed by: FAMILY MEDICINE

## 2023-11-11 PROCEDURE — 87077 CULTURE AEROBIC IDENTIFY: CPT | Performed by: FAMILY MEDICINE

## 2023-11-11 ASSESSMENT — ACTIVITIES OF DAILY LIVING (ADL): ADLS_ACUITY_SCORE: 37

## 2023-11-11 NOTE — DISCHARGE INSTRUCTIONS
We will contact you with results of the nasal swab.  If those results are negative an antibiotic will be called to the [facility] pharmacy to start pending the results of your additional blood cultures.  Please make a follow-up appointment in 2 days for recheck in the clinic.  Please return to the ER for any increase or worsening of symptoms.  We will also contact you should the blood cultures come back abnormal.

## 2023-11-11 NOTE — ED NOTES
"Pt states he is ready to leave. \"I am not waiting\". Provider updated. Covid swab collected and sent to lab.   "

## 2023-11-11 NOTE — ED PROVIDER NOTES
History     Chief Complaint   Patient presents with    LAB REQUEST     HPI  Parker Acevedo is a 53 year old male who presents to the emergency room today secondary to desire to have blood testing done because of recurrent episodes of chills and fever when he gets his TPN infusions through his central venous catheter.  The patient has been resistant to come in for but received a note from pharmacy that they will not deliver his IV infusions unless he gets checked out for possible central line catheter infection/colonization.  Patient states that he feels fine and currently has no symptoms of fever or illness.  He states that he prefers to just get the blood tests done and then he wants to return home and we can call him with any results that are abnormal.      Rika Lowe, RN     AS    11/10/23 11:16 AM  Note  Notification from [facility] Pharmacist.  Patient continues to report fevers but has not gone to ED as requested by RN 10/8/23.  Spoke with patient's wife and instructed her to take patient to ED for evaluation. Patient will not have fluids delivered until patient has been assessed and it has been determined that fevers are not from a line infection.  Patient's wife voiced understanding.  States she has been trying to get  to agree to go to ED.  Dr. Vyas notified.            Allergies:  Allergies   Allergen Reactions    Bactrim [Sulfamethoxazole-Trimethoprim] Rash    Penicillins Anaphylaxis     Please see Antimicrobial Management Team allergy assessment note 10/10/2018. Patient reported tolerating amoxicillin.  Tolerating cefepime and ceftriaxone without reaction 6/23    Ertapenem Nausea and Vomiting    Doxycycline Rash    Vancomycin Rash     Rash after receiving vancomycin 3/28/16 (infusion reaction?). Tolerated with slower infusion and diphenhydramine premed.  Tolerated 1250mg over 90minutes 7/2023.       Problem List:    Patient Active Problem List    Diagnosis Date Noted    Jejunal  intussusception (H) 09/30/2023     Priority: Medium    Cellulitis of left upper extremity 07/04/2023     Priority: Medium    Acute deep vein thrombosis (DVT) of axillary vein of left upper extremity (H) 07/04/2023     Priority: Medium    Gram-negative bacteremia 07/07/2022     Priority: Medium    Bacteremia associated with intravascular line, initial encounter  05/07/2022     Priority: Medium    Acute deep vein thrombosis (DVT) of axillary vein of right upper extremity (H) 02/28/2022     Priority: Medium    Fever, unknown origin 03/19/2021     Priority: Medium    Short bowel syndrome 01/30/2021     Priority: Medium    Bloodstream infection associated with central venous catheter, initial encounter 01/30/2021     Priority: Medium    Gastric outlet obstruction 10/07/2020     Priority: Medium     Added automatically from request for surgery [id number]      Gram-positive bacteremia 09/29/2019     Priority: Medium    On total parenteral nutrition 09/29/2019     Priority: Medium     Added automatically from request for surgery [id number]      PICC line infiltration, sequela 07/22/2019     Priority: Medium    Infection by Candida species 01/04/2019     Priority: Medium    Bacteremia 10/10/2018     Priority: Medium    Hyperlipidemia LDL goal <130 08/07/2018     Priority: Medium    S/P bariatric surgery 07/30/2018     Priority: Medium    Generalized weakness 01/30/2018     Priority: Medium    Catheter-related bloodstream infection (CRBSI) 09/23/2017     Priority: Medium    Low serum iron 09/08/2017     Priority: Medium    Anemia, iron deficiency 09/08/2017     Priority: Medium    Abnormal echocardiogram 04/11/2017     Priority: Medium    Port or reservoir infection, initial encounter 03/16/2017     Priority: Medium    Status post cervical spinal arthrodesis 02/15/2017     Priority: Medium    Short gut syndrome      Priority: Medium    Anxiety      Priority: Medium    Chronic nausea 05/10/2016     Priority: Medium    Fungemia  04/11/2016     Priority: Medium    Positive blood culture 03/29/2016     Priority: Medium    Insomnia 08/13/2015     Priority: Medium    Chronic pain 07/07/2015     Priority: Medium     Patient is followed by Dr. Milligan for ongoing prescription of pain medication.  All refills should be approved by this provider, or covering partner.    Medication(s): Fentanyl 50 mcg patches every three days/ Liquid oxycodone 5 mg/5ml up to 50 mg daily.   Maximum quantity per month: as per Dr. Milligan through [facility]  Clinic visit frequency required: Q 3 months at Pain Management    Controlled substance agreement on file: Yes       Date(s): Through Pain Management    Pain Clinic evaluation in the past: Yes       Date(s):  Ongoing every three months       Location(s):  Per pain management    DIRE Total Score(s):  No flowsheet data found.    Last [facility] website verification:  Through Pain Management   [email or web address]    Esteban Daly MD            Health Care Home 02/24/2015     Priority: Medium             Iron deficiency 05/23/2014     Priority: Medium    Vitamin D deficiency 05/22/2014     Priority: Medium    Former smoker 02/24/2014     Priority: Medium    Bile reflux esophagitis 10/16/2013     Priority: Medium    Constipation 10/01/2013     Priority: Medium    Chronic anxiety 09/25/2013     Priority: Medium    Dysphagia 09/17/2013     Priority: Medium    Weight loss, non-intentional 09/17/2013     Priority: Medium    Malnutrition (H24) 09/17/2013     Priority: Medium    Dehydration 08/28/2013     Priority: Medium    Vitamin B12 deficiency without anemia 07/31/2013     Priority: Medium     Diagnosis updated by automated process. Provider to review and confirm.      Thiamine deficiency 07/31/2013     Priority: Medium    ADHD (attention deficit hyperactivity disorder), inattentive type 07/02/2013     Priority: Medium     Patient is followed by RICCO SHARP for ongoing prescription of stimulants.  All  refills should be approved by this provider, or covering partner.    Medication(s): Adderall.   Maximum quantity per month: 30  Clinic visit frequency required:      Controlled substance agreement on file: No  Neuropsych evaluation for ADD completed:  No    Last [facility] website verification:  done on 5/6/19  [email or web address]        Anemia 09/20/2012     Priority: Medium    Vomiting 06/28/2012     Priority: Medium    Chronic abdominal pain 06/01/2012     Priority: Medium     Patient is followed by ALEXANDRIA WHITLEY for ongoing prescription of narcotic pain medicine.  Med: liquid oxycodone up to 60 mg per day.   Maximum use per month:   Expected duration: ongoing, hope per surgery that will resolve with upcoming surgery  Narcotic agreement on file: YES  Clinic visit recommended: Q 3 months        Peptic ulcer disease 04/08/2010     Priority: Medium    Gastric bypass status for obesity 04/08/2010     Priority: Medium     Top weight of 492.          Past Medical History:    Past Medical History:   Diagnosis Date    ADHD (attention deficit hyperactivity disorder)     Anxiety     Cardiomyopathy in nutritional diseases (H)     Chronic abdominal pain     CLABSI (central line-associated bloodstream infection)     Complication of anesthesia     Difficulty swallowing     Gastric ulcer, unspecified as acute or chronic, without mention of hemorrhage, perforation, or obstruction     Gastro-oesophageal reflux disease     Head injury     Hiatal hernia     Other bladder disorder     Other chronic pain     PONV (postoperative nausea and vomiting)     Severe malnutrition (H24)     Short gut syndrome     Tobacco abuse        Past Surgical History:    Past Surgical History:   Procedure Laterality Date    AMPUTATION      APPENDECTOMY      BACK SURGERY  11/3/2014    curve in the spine    BIOPSY LYMPH NODE CERVICAL N/A 2/20/2015    Procedure: BIOPSY LYMPH NODE CERVICAL;  Surgeon: Baron Scanlon MD;  Location:   OR    CHOLECYSTECTOMY      COLONOSCOPY N/A 7/14/2021    Procedure: COLONOSCOPY;  Surgeon: Jimbo Estrada MD;  Location: [facility] OR    COLONOSCOPY N/A 4/13/2022    Procedure: COLONOSCOPY;  Surgeon: Jimbo Estrada MD;  Location: [facility] OR    COLONOSCOPY N/A 9/19/2023    Procedure: Colonoscopy;  Surgeon: Jimbo Estrada MD;  Location: [facility] OR    DISCECTOMY, FUSION CERVICAL ANTERIOR ONE LEVEL, COMBINED N/A 2/15/2017    Procedure: COMBINED DISCECTOMY, FUSION CERVICAL ANTERIOR ONE LEVEL;  Surgeon: Darren Campos MD;  Location: [facility]    ENDOSCOPIC INSERTION TUBE GASTROSTOMY  9/9/2013    Procedure: ENDOSCOPIC INSERTION TUBE GASTROSTOMY;;  Surgeon: Francis Vyas MD;  [facility]    ENDOSCOPIC ULTRASOUND UPPER GASTROINTESTINAL TRACT (GI)  4/29/2011    Procedure:ENDOSCOPIC ULTRASOUND UPPER GASTROINTESTINAL TRACT (GI); Both Procedures done Conjointly; Surgeon:NEREIDA HOUSER; [facility]    ENDOSCOPIC ULTRASOUND UPPER GASTROINTESTINAL TRACT (GI)  9/9/2013    Procedure: ENDOSCOPIC ULTRASOUND UPPER GASTROINTESTINAL TRACT (GI);  Endoscopic Ultrasound Guide Gastrostomy Tube Placement  C-arm;  Surgeon: Noe Lizarraga MD;  [facility]    ENDOSCOPIC ULTRASOUND UPPER GASTROINTESTINAL TRACT (GI) N/A 2/24/2021    Procedure: ENDOSCOPIC ULTRASOUND, ESOPHAGOSCOPY / UPPER GASTROINTESTINAL TRACT (GI), esophagastrogastroduodenoscopy;  Surgeon: Berny Bach MD;  [facility]    ENDOSCOPY  03/25/11    EGD, [facility]    ENDOSCOPY  08/04/09    Upper Endoscopy, [facility]    ENDOSCOPY  01/05/09    Upper Endoscopy, [facility]    ESOPHAGOSCOPY, GASTROSCOPY, DUODENOSCOPY (EGD), COMBINED  4/20/2011    Procedure:COMBINED ESOPHAGOSCOPY, GASTROSCOPY, DUODENOSCOPY (EGD); Surgeon:FRANCIS VYAS; [facility]    ESOPHAGOSCOPY, GASTROSCOPY, DUODENOSCOPY (EGD), COMBINED  6/15/2011    Procedure:COMBINED ESOPHAGOSCOPY, GASTROSCOPY, DUODENOSCOPY (EGD);  Surgeon:FRANCIS VYAS; Location: GI    ESOPHAGOSCOPY, GASTROSCOPY, DUODENOSCOPY (EGD), COMBINED  6/12/2013    Procedure: COMBINED ESOPHAGOSCOPY, GASTROSCOPY, DUODENOSCOPY (EGD);;  Surgeon: Francis Vyas MD;  Location:  GI    ESOPHAGOSCOPY, GASTROSCOPY, DUODENOSCOPY (EGD), COMBINED  11/22/2013    Procedure: COMBINED ESOPHAGOSCOPY, GASTROSCOPY, DUODENOSCOPY (EGD);;  Surgeon: Francis Vyas MD;  [facility]    ESOPHAGOSCOPY, GASTROSCOPY, DUODENOSCOPY (EGD), COMBINED  4/30/2014    Procedure: COMBINED ESOPHAGOSCOPY, GASTROSCOPY, DUODENOSCOPY (EGD);  Surgeon: Francis Vyas MD;  Location:  GI    ESOPHAGOSCOPY, GASTROSCOPY, DUODENOSCOPY (EGD), COMBINED N/A 2/20/2015    Procedure: COMBINED ESOPHAGOSCOPY, GASTROSCOPY, DUODENOSCOPY (EGD), BIOPSY SINGLE OR MULTIPLE;  Surgeon: Baron Scanlon MD;  Location:  OR    ESOPHAGOSCOPY, GASTROSCOPY, DUODENOSCOPY (EGD), COMBINED N/A 9/30/2015    Procedure: COMBINED ESOPHAGOSCOPY, GASTROSCOPY, DUODENOSCOPY (EGD);  Surgeon: Francis Vyas MD;  Location:  GI    ESOPHAGOSCOPY, GASTROSCOPY, DUODENOSCOPY (EGD), COMBINED N/A 10/3/2019    Procedure: Upper Endoscopy;  Surgeon: Clif Morrow MD;  [facility]    GASTRECTOMY  6/22/2012    Procedure: GASTRECTOMY;  Open Approach, Excise Ulcers,Partial Gastrectomy, Esophagojejunostomy, Hiatal Hernia Repair, Extensive Lysis of Adhesions and Esaphagogastrodudenoscopy.;  Surgeon: Francis Vyas MD;  [facility]    GASTROJEJUNOSTOMY  08/26/09    Extensice enterolysis, partial resect. jejunum, part. resect gastric pouch, gastrojejunostomy anastomosis    HC ESOPH/GAS REFLUX TEST W NASAL IMPED ELECTRODE  8/5/2013    Procedure: ESOPHAGEAL IMPEDENCE FUNCTION TEST 1 HOUR OR LESS;  Surgeon: Halie Lang MD;  Location:  GI    HEAD & NECK SURGERY  2/15/2017    C5-C6    HERNIA REPAIR  2006    Umbilical hernia    HERNIORRHAPHY HIATAL  6/22/2012    Procedure: HERNIORRHAPHY HIATAL;;   Surgeon: Francis Vyas MD;  [facility]    HERNIORRHAPHY INGUINAL  11/22/2013    Procedure: HERNIORRHAPHY INGUINAL;;  Surgeon: Francis Vyas MD;  [facility]    INSERT PICC LINE Right 12/19/2019    Procedure: Picc Placement;  Surgeon: Per Dumont PA-C;  [facility]    INSERT PICC LINE Right 2/21/2020    Procedure: INSERTION, PICC;  Surgeon: Per Dumont PA-C;  [facility]    INSERT PORT VASCULAR ACCESS Right 12/19/2017    Procedure: INSERT PORT VASCULAR ACCESS;  Right Chest Port Placement ;  Surgeon: Lisandro Alejandro PA-C;  [facility]    INSERT PORT VASCULAR ACCESS Right 8/2/2018    Procedure: INSERT PORT VASCULAR ACCESS;  Place single lumen tunneled central venous access catheter;  Surgeon: Guy Jamil PA-C;  [facility]    IR CVC TUNNEL PLACEMENT > 5 YRS OF AGE  8/7/2019    IR CVC TUNNEL PLACEMENT > 5 YRS OF AGE  4/14/2020    IR CVC TUNNEL PLACEMENT > 5 YRS OF AGE  8/3/2020    IR CVC TUNNEL PLACEMENT > 5 YRS OF AGE  9/4/2020    IR CVC TUNNEL PLACEMENT > 5 YRS OF AGE  2/5/2021    IR CVC TUNNEL PLACEMENT > 5 YRS OF AGE  3/23/2021    IR CVC TUNNEL PLACEMENT > 5 YRS OF AGE  1/13/2022    IR CVC TUNNEL PLACEMENT > 5 YRS OF AGE  5/12/2022    IR CVC TUNNEL PLACEMENT > 5 YRS OF AGE  7/13/2022    IR CVC TUNNEL PLACEMENT > 5 YRS OF AGE  7/10/2023    IR CVC TUNNEL REMOVAL LEFT  6/7/2023    IR CVC TUNNEL REMOVAL RIGHT  10/1/2019    IR CVC TUNNEL REMOVAL RIGHT  7/30/2020    IR CVC TUNNEL REMOVAL RIGHT  9/2/2020    IR CVC TUNNEL REMOVAL RIGHT  2/3/2021    IR CVC TUNNEL REMOVAL RIGHT  3/19/2021    IR CVC TUNNEL REMOVAL RIGHT  1/10/2022    IR CVC TUNNEL REMOVAL RIGHT  5/9/2022    IR CVC TUNNEL REMOVAL RIGHT  7/8/2022    IR CVC TUNNEL REVISION LEFT  8/10/2022    IR CVC TUNNEL REVISION LEFT  9/19/2023    IR CVC TUNNEL REVISION RIGHT  5/7/2021    IR FOLLOW UP VISIT OUTPATIENT  8/7/2019    IR PICC EXCHANGE LEFT  6/8/2023    IR PICC PLACEMENT > 5 YRS OF AGE   3/7/2019    IR PICC PLACEMENT > 5 YRS OF AGE  12/19/2019    IR PICC PLACEMENT > 5 YRS OF AGE  2/21/2020    IR PICC PLACEMENT > 5 YRS OF AGE  6/7/2023    LAPAROTOMY EXPLORATORY  11/22/2013    Procedure: LAPAROTOMY EXPLORATORY;  Exploratory Laparotomy, Upper Endoscopy, Left Inguinal Hernia Repair;  Surgeon: Francis Vyas MD;  [facility]    LAPAROTOMY EXPLORATORY N/A 9/30/2023    Procedure: Laparotomy exploratory, reduction of intussusception, resection of small bowel with primary anastamosis, upper endoscopy;  Surgeon: Delvis Mercado MD;  [facility]    ORTHOPEDIC SURGERY      PICC INSERTION Right 03/16/2017    5fr DL BioFlo PICC, 42cm (3cm external) in the R medial brachial vein w/ tip in the SVC RA junction.    PICC INSERTION Left 09/23/2017    5fr DL BioFlo PICC, 45cm (1cm external) in the L basilic vein w/ tip in the SVC RA junction.    PICC INSERTION Right 05/16/2019    5Fr - 43cm, Medial brachial vein, low SVC    PICC INSERTION Right 10/02/2019    5Fr - 43cm (2cm external), basilic vein, low SVC    SHAYLEE EN Y BOWEL  2003    SOFT TISSUE SURGERY      THORACIC SURGERY      TONSILLECTOMY      TRANSESOPHAGEAL ECHOCARDIOGRAM INTRAOPERATIVE N/A 1/8/2019    Procedure: TRANSESOPHAGEAL ECHOCARDIOGRAM INTRAOPERATIVE;  Surgeon: GENERIC ANESTHESIA PROVIDER;  [facility]    TRANSESOPHAGEAL ECHOCARDIOGRAM INTRAOPERATIVE N/A 7/7/2023    Procedure: Transesophageal echocardiogram in the OR;  Surgeon: GENERIC ANESTHESIA PROVIDER;  [facility]    ZZC GASTRIC BYPASS,OBESE<100CM SHAYLEE-EN-Y  2002    lost 300 pounds       Family History:    Family History   Problem Relation Age of Onset    Gastrointestinal Disease Mother         Crohns disease    Anxiety Disorder Mother     Thyroid Disease Mother         Grave's disease    Cancer Father         ear cancer-skin cancer/melanoma    Breast Cancer Maternal Grandmother     Macular Degeneration Maternal Grandfather     Anxiety Disorder Sister     Diabetes Maternal  Uncle     Breast Cancer Other     Hypertension No family hx of     Hyperlipidemia No family hx of     Cerebrovascular Disease No family hx of     Prostate Cancer No family hx of     Depression No family hx of     Anesthesia Reaction No family hx of     Asthma No family hx of     Osteoporosis No family hx of     Genetic Disorder No family hx of     Obesity No family hx of     Mental Illness No family hx of     Substance Abuse No family hx of     Glaucoma No family hx of        Social History:  Marital Status:   [2]  Social History     Tobacco Use    Smoking status: Light Smoker     Packs/day: 0.50     Years: 3.00     Additional pack years: 0.00     Total pack years: 1.50     Types: Cigarettes    Smokeless tobacco: Never    Tobacco comments:     2/4/2021    smokes 3 cigarettes/day   Vaping Use    Vaping Use: Never used   Substance Use Topics    Alcohol use: No     Comment: quit 2002    Drug use: No        Medications:    albuterol (VENTOLIN HFA) 108 (90 Base) MCG/ACT inhaler  ALPRAZolam (XANAX) 0.5 MG tablet  amphetamine-dextroamphetamine (ADDERALL) 20 MG tablet  bisacodyl (DULCOLAX) 5 MG EC tablet  carvedilol (COREG) 6.25 MG tablet  cyanocobalamin (CYANOCOBALAMIN) 1000 MCG/ML injection  enoxaparin ANTICOAGULANT (LOVENOX) 80 MG/0.8ML syringe  [facility] INFUSION MANAGED PATIENT  fentaNYL (DURAGESIC) 25 mcg/hr 72 hr patch  gabapentin (NEURONTIN) 100 MG capsule  lipids plant base (CLINOLIPID) 20 % infusion  naloxone (NARCAN) 4 MG/0.1ML nasal spray  naloxone (NARCAN) 4 MG/0.1ML nasal spray  nystatin (MYCOSTATIN) 335838 UNIT/GM external cream  ondansetron (ZOFRAN ODT) 4 MG ODT tab  ondansetron (ZOFRAN ODT) 8 MG ODT tab  ondansetron (ZOFRAN) 4 MG tablet  oxyCODONE (ROXICODONE) 5 MG/5ML solution  oxyCODONE (ROXICODONE) 5 MG/5ML solution  pantoprazole (PROTONIX) 40 MG EC tablet  polyethylene glycol (GOLYTELY) 236 g suspension  polyethylene glycol (MIRALAX) 17 GM/Dose powder  pregabalin (LYRICA) 25 MG  "capsule  sucralfate (CARAFATE) 1 GM/10ML suspension  Syringe/Needle, Disp, (B-D ECLIPSE SYRINGE) 27G X 1/2\" 1 ML MISC  vitamin D2 (ERGOCALCIFEROL) 82089 units (1250 mcg) capsule          Review of Systems   Constitutional:  Positive for chills and fever (Associated with any use of his central line and infusion of his TPN.).   All other systems reviewed and are negative.      Physical Exam   BP: (!) 129/93  Pulse: 74  Temp: 98.1  F (36.7  C)  Resp: 18  Height: 180.3 cm (5' 11\")  Weight: 82.1 kg (181 lb)  SpO2: 99 %      Physical Exam  Vitals and nursing note reviewed. Exam conducted with a chaperone present (Family).   Constitutional:       General: He is not in acute distress.  HENT:      Head: Atraumatic.   Eyes:      General: No scleral icterus.     Conjunctiva/sclera: Conjunctivae normal.      Pupils: Pupils are equal, round, and reactive to light.   Cardiovascular:      Rate and Rhythm: Normal rate.   Pulmonary:      Effort: No respiratory distress.   Skin:     Findings: No rash.   Neurological:      Mental Status: He is alert and oriented to person, place, and time.   Psychiatric:         Mood and Affect: Mood normal.         Behavior: Behavior normal.         ED Course                 Procedures              Critical Care time:  none               Results for orders placed or performed during the hospital encounter of 11/11/23   XR Chest 2 Views     Status: None    Narrative    EXAM: XR CHEST 2 VIEWS  LOCATION: [facility] ED  DATE: 11/11/2023    INDICATION: Occult fever  COMPARISON: None.      Impression    IMPRESSION: Heart is normal in size. The left IJ tunneled catheter is seen with the tip in the mid SVC, unchanged. There is increasing patchy interstitial opacities seen in the right mid and lower lung zone.   Procalcitonin     Status: Abnormal   Result Value Ref Range    Procalcitonin 1.46 (H) <0.05 ng/mL   Lactic acid whole blood     Status: Normal   Result Value Ref Range    " Lactic Acid 0.8 0.7 - 2.0 mmol/L   Basic metabolic panel     Status: Abnormal   Result Value Ref Range    Sodium 139 135 - 145 mmol/L    Potassium 4.2 3.4 - 5.3 mmol/L    Chloride 104 98 - 107 mmol/L    Carbon Dioxide (CO2) 25 22 - 29 mmol/L    Anion Gap 10 7 - 15 mmol/L    Urea Nitrogen 16.2 6.0 - 20.0 mg/dL    Creatinine 0.75 0.67 - 1.17 mg/dL    GFR Estimate >90 >60 mL/min/1.73m2    Calcium 8.6 8.6 - 10.0 mg/dL    Glucose 107 (H) 70 - 99 mg/dL   CRP inflammation     Status: Abnormal   Result Value Ref Range    CRP Inflammation 11.39 (H) <5.00 mg/L   UA with Microscopic reflex to Culture     Status: Abnormal    Specimen: Urine, Midstream   Result Value Ref Range    Color Urine Yellow Colorless, Straw, Light Yellow, Yellow    Appearance Urine Clear Clear    Glucose Urine Negative Negative mg/dL    Bilirubin Urine Negative Negative    Ketones Urine 5 (A) Negative mg/dL    Specific Gravity Urine 1.024 1.003 - 1.035    Blood Urine Negative Negative    pH Urine 6.0 5.0 - 7.0    Protein Albumin Urine Negative Negative mg/dL    Urobilinogen Urine 4.0 (A) Normal, 2.0 mg/dL    Nitrite Urine Negative Negative    Leukocyte Esterase Urine Negative Negative    Mucus Urine Present (A) None Seen /LPF    RBC Urine 2 <=2 /HPF    WBC Urine 1 <=5 /HPF    Narrative    Urine Culture not indicated   CBC with platelets and differential     Status: Abnormal   Result Value Ref Range    WBC Count 7.4 4.0 - 11.0 10e3/uL    RBC Count 3.91 (L) 4.40 - 5.90 10e6/uL    Hemoglobin 10.0 (L) 13.3 - 17.7 g/dL    Hematocrit 32.9 (L) 40.0 - 53.0 %    MCV 84 78 - 100 fL    MCH 25.6 (L) 26.5 - 33.0 pg    MCHC 30.4 (L) 31.5 - 36.5 g/dL    RDW 18.4 (H) 10.0 - 15.0 %    Platelet Count 275 150 - 450 10e3/uL    % Neutrophils 46 %    % Lymphocytes 40 %    % Monocytes 10 %    % Eosinophils 3 %    % Basophils 1 %    % Immature Granulocytes 0 %    NRBCs per 100 WBC 0 <1 /100    Absolute Neutrophils 3.4 1.6 - 8.3 10e3/uL    Absolute Lymphocytes 2.9 0.8 - 5.3  10e3/uL    Absolute Monocytes 0.7 0.0 - 1.3 10e3/uL    Absolute Eosinophils 0.2 0.0 - 0.7 10e3/uL    Absolute Basophils 0.1 0.0 - 0.2 10e3/uL    Absolute Immature Granulocytes 0.0 <=0.4 10e3/uL    Absolute NRBCs 0.0 10e3/uL   Symptomatic Influenza A/B, RSV, & SARS-CoV2 PCR (COVID-19) Nasopharyngeal     Status: Normal    Specimen: Nasopharyngeal; Swab   Result Value Ref Range    Influenza A PCR Negative Negative    Influenza B PCR Negative Negative    RSV PCR Negative Negative    SARS CoV2 PCR Negative Negative    Narrative    Testing was performed using the Xpert Xpress CoV2/Flu/RSV Assay on the Cepheid GeneXpert Instrument. This test should be ordered for the detection of SARS-CoV-2, influenza, and RSV viruses in individuals who meet clinical and/or epidemiological criteria. Test performance is unknown in asymptomatic patients. This test is for in vitro diagnostic use under the FDA EUA for laboratories certified under CLIA to perform high or moderate complexity testing. This test has not been FDA cleared or approved. A negative result does not rule out the presence of PCR inhibitors in the specimen or target RNA in concentration below the limit of detection for the assay. If only one viral target is positive but coinfection with multiple targets is suspected, the sample should be re-tested with another FDA cleared, approved, or authorized test, if coinfection would change clinical management. This test was validated by the [facility] i-marker. These laboratories are certified under the Clinical Laboratory Improvement Amendments of 1988 (CLIA-88) as qualified to perform high complexity laboratory testing.   Blood Culture Peripheral Blood     Status: Normal (Preliminary result)    Specimen: Peripheral Blood   Result Value Ref Range    Culture No growth after 12 hours    Blood Culture Line, venous     Status: Abnormal (Preliminary result)    Specimen: Line, venous; Blood   Result Value Ref Range    Culture  Positive on the 1st day of incubation (A)     Culture Klebsiella pneumoniae (AA)    Verigene GN Panel     Status: Abnormal    Specimen: Line, venous; Blood   Result Value Ref Range    Acinetobacter species Not Detected Not Detected    Citrobacter species Not Detected Not Detected    Enterobacter species Not Detected Not Detected    Proteus species Not Detected Not Detected    Escherichia coli Not Detected Not Detected    Klebsiella pneumoniae Detected (A) Not Detected    Klebsiella oxytoca Not Detected Not Detected    Pseudomonas aeruginosa Not Detected Not Detected    CTX-M Not Detected Not Detected, NA    KPC Not Detected Not Detected, NA    NDM Not Detected Not Detected, NA    VIM Not Detected Not Detected, NA    IMP Not Detected Not Detected, NA    OXA Not Detected Not Detected, NA    Narrative    Specimen tested with Verigene multiplex, gram-negative blood culture nucleic acid test for the following targets: Acinetobacter species, Citrobacter species, Enterobacter species, Proteus species, Escherichia coli, Klebsiella pneumoniae, Klebsiella oxytoca, Pseudomonas aeruginosa, and the following resistance markers: CTX-M, KPC, NDM, VIM, IMP and OXA.   CBC with platelets differential     Status: Abnormal    Narrative    The following orders were created for panel order CBC with platelets differential.  Procedure                               Abnormality         Status                     ---------                               -----------         ------                     CBC with platelets and d...[[id number]]  Abnormal            Final result                 Please view results for these tests on the individual orders.         Medications - No data to display    Assessments & Plan (with Medical Decision Making)  Patient to the emergency room requesting blood testing for possible central line infection/colonization with a history of developing fever and chills symptoms after each infusion of his TPN for the last  several days.  Patient refusing additional evaluation and desires not to stay in the emergency room for the results of this test but prefers that we call him if they come back abnormal.  CXR and Blood test drawn.  Patient did allow for COVID-19 testing.  We recommended that he stay for the results of the test but refused.  Patient left the ER with his family what appeared to be stable condition.    6:05 AM 11/12/23  I reviewed the patient's chart and noted that Dr. Taylor was contacted yesterday late morning stating that his blood culture was growing Klebsiella pneumonia.  I spoke with Dr. Taylor who stated that she contacted the patient's family and they are going to bring him to the [facility] emergency room for evaluation.  In reviewed of the patient's chart, there was no evidence that he was seen at the [facility] or anywhere else in the [place] system so I called the patient.  I spoke to the patient's wife who states that the patient refused to go into the ER yesterday but plans to be seen later this morning.  I made the patient's wife aware of the seriousness of the condition and the fact that he has likely colonization of the catheter with the Klebsiella pneumonia bacteria in the seeding this bacteria into his bloodstream when the catheter is used is likely the reason for his episodes of fever and chills and he risks the possibility of sepsis by delaying treatment.  She stated that she would definitely have the patient seen today.     I have reviewed the nursing notes.    I have reviewed the findings, diagnosis, plan and need for follow up with the patient.           Medical Decision Making  The patient's presentation was of moderate complexity (a chronic illness mild to moderate exacerbation, progression, or side effect of treatment).    The patient's evaluation involved:  ordering and/or review of 3+ test(s) in this encounter (see separate area of note for details)    The patient's management  necessitated moderate risk (limitations due to social determinants of health (patient refused exam except for blood testing and left prior to test results being present desiring a call should they come back abnormal.)).        Final diagnoses:   Fever in adult   Abnormal finding on chest xray       11/11/2023   [facility] EMERGENCY DEPT       Ab Loera, DO  11/12/23 0359       Ab Loera, DO  11/12/23 0608       Ab Loera, DO  11/12/23 0609

## 2023-11-11 NOTE — ED TRIAGE NOTES
Per infectious disease lab: Blood culture + for : Gram negative Bacilli. Message given to Dr Taylor.

## 2023-11-11 NOTE — ED TRIAGE NOTES
"Pt has a vascular access device in L chest and also a gastric tube and 'only has fevers with infusions, TPN, etc infusing'. Per notes from [facility] pt has been told to come to ED for eval and blood cultures to confirm that there is no infection. Pt denies any sx at all, \"I feel great\".      Triage Assessment (Adult)       Row Name 11/11/23 0015          Triage Assessment    Airway WDL WDL        Respiratory WDL    Respiratory WDL WDL        Cardiac WDL    Cardiac WDL WDL                     "

## 2023-11-11 NOTE — ED NOTES
We were contacted regarding positive blood cultures drawn earlier this morning.  Gram-negative bacilli drawn from his tunneled Cm.    I called patient and spoke with his wife.  She is going to bring the patient straight to [facility] emergency department.  I spoke with the ED provider so they are aware.    Elida Taylor MD   11/11/2023  13:00     Elida Taylor MD  11/11/23 8505

## 2023-11-11 NOTE — ED NOTES
Blood culture x2 collected, lab collected. Pt is trying to collect urine for UA and will have an xray. SO at bedside.

## 2023-11-12 ASSESSMENT — ENCOUNTER SYMPTOMS
CHILLS: 1
FEVER: 1

## 2023-11-13 ENCOUNTER — PATIENT OUTREACH (OUTPATIENT)
Dept: CARE COORDINATION | Facility: CLINIC | Age: 53
End: 2023-11-13
Payer: COMMERCIAL

## 2023-11-13 LAB
BACTERIA BLD CULT: ABNORMAL
BACTERIA BLD CULT: ABNORMAL

## 2023-11-13 NOTE — PROGRESS NOTES
Clinic Care Coordination Contact  Follow Up Progress Note      Assessment: RN CC called and spoke to patient's wife Rose. Patient was in the emergency room over the weekend. He had some labs done, xray done and then left. It appears the emergency room providers have been trying to get patient to come back to the emergency room as he has an infection going on. Patient's wife said the patient has not gone to the emergency room yet. She said she has been trying to get him to go but he wont. RN CC asked if there was a reason or barrier preventing him from going back in. Patient's wife said no, he just don't want to go. Wife said she will continue to work on talking him into going back to the emergency room. She said at this time, they have no needs for RN CC. She agrees to reach out if something comes up.      Care Gaps:    Health Maintenance Due   Topic Date Due    HEPATITIS B IMMUNIZATION (1 of 3 - 3-dose series) Never done    ZOSTER IMMUNIZATION (1 of 2) Never done    MEDICARE ANNUAL WELLNESS VISIT  06/21/2017    COVID-19 Vaccine (3 - Moderna risk series) 09/06/2021    LUNG CANCER SCREENING  02/26/2023    INFLUENZA VACCINE (1) 09/01/2023    ADVANCE CARE PLANNING  10/11/2023       Postponed to next Primary Care Provider visit     Care Plans  Care Plan: Annual Wellness       Problem: Appointment       Goal: I will complete my annual wellness visit.       Start Date: 5/20/2022 Expected End Date: 10/2/2022    This Visit's Progress: 20% Recent Progress: 20%    Note:     Barriers: complex care, and high volume of appointments at baseline  Strengths: wife is pts main caregiver, and organizes his appointments.   Patient expressed understanding of goal: yes  Action steps to achieve this goal:  1. I will schedule my annual wellness visit; [phone] ([phone])  2. I will attend my annual wellness visit.  3. I will contact my Care Management or clinic team if I have barriers to attending my annual wellness visit.                                 Intervention/Education provided during outreach: As above. CC role, goal(s), clinic after hours, appointments, discussed/reviewed. Support provided.    Plan:   Patient will take all medications as prescribed.   Patient's wife will continue to try and get patient to go to the emergency room.   Patient/caregiver will call RNCC with questions, concerns, support needs. RNCC will be available as needed.     Care Coordinator will follow up in 1 month.     Kinsey Muhamamd RN Care Coordination   [facility]  Email: [email or web address]  Phone: [phone]

## 2023-11-14 ENCOUNTER — APPOINTMENT (OUTPATIENT)
Dept: GENERAL RADIOLOGY | Facility: CLINIC | Age: 53
DRG: 314 | End: 2023-11-14
Attending: EMERGENCY MEDICINE
Payer: COMMERCIAL

## 2023-11-14 ENCOUNTER — APPOINTMENT (OUTPATIENT)
Dept: INTERVENTIONAL RADIOLOGY/VASCULAR | Facility: CLINIC | Age: 53
DRG: 314 | End: 2023-11-14
Attending: PHYSICIAN ASSISTANT
Payer: COMMERCIAL

## 2023-11-14 ENCOUNTER — HOSPITAL ENCOUNTER (INPATIENT)
Facility: CLINIC | Age: 53
LOS: 3 days | Discharge: HOME HEALTH | DRG: 314 | End: 2023-11-17
Attending: EMERGENCY MEDICINE | Admitting: INTERNAL MEDICINE
Payer: COMMERCIAL

## 2023-11-14 ENCOUNTER — PATIENT OUTREACH (OUTPATIENT)
Dept: CARE COORDINATION | Facility: CLINIC | Age: 53
End: 2023-11-14

## 2023-11-14 DIAGNOSIS — T82.7XXA BACTEREMIA ASSOCIATED WITH INTRAVASCULAR LINE, INITIAL ENCOUNTER (H): ICD-10-CM

## 2023-11-14 DIAGNOSIS — R78.81 BACTEREMIA ASSOCIATED WITH INTRAVASCULAR LINE, INITIAL ENCOUNTER (H): ICD-10-CM

## 2023-11-14 DIAGNOSIS — R78.81 BACTEREMIA: Primary | ICD-10-CM

## 2023-11-14 LAB
ALBUMIN SERPL BCG-MCNC: 3.7 G/DL (ref 3.5–5.2)
ALP SERPL-CCNC: 61 U/L (ref 40–129)
ALT SERPL W P-5'-P-CCNC: 7 U/L (ref 0–70)
ANION GAP SERPL CALCULATED.3IONS-SCNC: 9 MMOL/L (ref 7–15)
AST SERPL W P-5'-P-CCNC: 11 U/L (ref 0–45)
BASOPHILS # BLD AUTO: 0.1 10E3/UL (ref 0–0.2)
BASOPHILS NFR BLD AUTO: 1 %
BILIRUB SERPL-MCNC: 0.2 MG/DL
BUN SERPL-MCNC: 15 MG/DL (ref 6–20)
CALCIUM SERPL-MCNC: 8.5 MG/DL (ref 8.6–10)
CHLORIDE SERPL-SCNC: 105 MMOL/L (ref 98–107)
CREAT SERPL-MCNC: 0.81 MG/DL (ref 0.67–1.17)
DEPRECATED HCO3 PLAS-SCNC: 24 MMOL/L (ref 22–29)
EGFRCR SERPLBLD CKD-EPI 2021: >90 ML/MIN/1.73M2
EOSINOPHIL # BLD AUTO: 0.2 10E3/UL (ref 0–0.7)
EOSINOPHIL NFR BLD AUTO: 2 %
ERYTHROCYTE [DISTWIDTH] IN BLOOD BY AUTOMATED COUNT: 18.4 % (ref 10–15)
GLUCOSE BLDC GLUCOMTR-MCNC: 120 MG/DL (ref 70–99)
GLUCOSE SERPL-MCNC: 95 MG/DL (ref 70–99)
HCT VFR BLD AUTO: 32.2 % (ref 40–53)
HGB BLD-MCNC: 9.6 G/DL (ref 13.3–17.7)
IMM GRANULOCYTES # BLD: 0.1 10E3/UL
IMM GRANULOCYTES NFR BLD: 1 %
LACTATE SERPL-SCNC: 0.8 MMOL/L (ref 0.7–2)
LYMPHOCYTES # BLD AUTO: 3.1 10E3/UL (ref 0.8–5.3)
LYMPHOCYTES NFR BLD AUTO: 37 %
MCH RBC QN AUTO: 25.4 PG (ref 26.5–33)
MCHC RBC AUTO-ENTMCNC: 29.8 G/DL (ref 31.5–36.5)
MCV RBC AUTO: 85 FL (ref 78–100)
MONOCYTES # BLD AUTO: 0.7 10E3/UL (ref 0–1.3)
MONOCYTES NFR BLD AUTO: 8 %
NEUTROPHILS # BLD AUTO: 4.3 10E3/UL (ref 1.6–8.3)
NEUTROPHILS NFR BLD AUTO: 51 %
NRBC # BLD AUTO: 0 10E3/UL
NRBC BLD AUTO-RTO: 0 /100
PLATELET # BLD AUTO: 291 10E3/UL (ref 150–450)
POTASSIUM SERPL-SCNC: 3.8 MMOL/L (ref 3.4–5.3)
PROT SERPL-MCNC: 6.8 G/DL (ref 6.4–8.3)
RBC # BLD AUTO: 3.78 10E6/UL (ref 4.4–5.9)
SODIUM SERPL-SCNC: 138 MMOL/L (ref 135–145)
WBC # BLD AUTO: 8.4 10E3/UL (ref 4–11)

## 2023-11-14 PROCEDURE — 96365 THER/PROPH/DIAG IV INF INIT: CPT | Performed by: EMERGENCY MEDICINE

## 2023-11-14 PROCEDURE — 36415 COLL VENOUS BLD VENIPUNCTURE: CPT | Performed by: INTERNAL MEDICINE

## 2023-11-14 PROCEDURE — 87040 BLOOD CULTURE FOR BACTERIA: CPT | Performed by: INTERNAL MEDICINE

## 2023-11-14 PROCEDURE — 87077 CULTURE AEROBIC IDENTIFY: CPT | Performed by: PHYSICIAN ASSISTANT

## 2023-11-14 PROCEDURE — 85025 COMPLETE CBC W/AUTO DIFF WBC: CPT | Performed by: EMERGENCY MEDICINE

## 2023-11-14 PROCEDURE — 250N000011 HC RX IP 250 OP 636: Mod: JZ

## 2023-11-14 PROCEDURE — 250N000013 HC RX MED GY IP 250 OP 250 PS 637

## 2023-11-14 PROCEDURE — 99285 EMERGENCY DEPT VISIT HI MDM: CPT | Performed by: EMERGENCY MEDICINE

## 2023-11-14 PROCEDURE — 71046 X-RAY EXAM CHEST 2 VIEWS: CPT

## 2023-11-14 PROCEDURE — 258N000003 HC RX IP 258 OP 636: Performed by: INTERNAL MEDICINE

## 2023-11-14 PROCEDURE — 83605 ASSAY OF LACTIC ACID: CPT

## 2023-11-14 PROCEDURE — 99222 1ST HOSP IP/OBS MODERATE 55: CPT | Mod: GC | Performed by: INTERNAL MEDICINE

## 2023-11-14 PROCEDURE — 36415 COLL VENOUS BLD VENIPUNCTURE: CPT

## 2023-11-14 PROCEDURE — 87070 CULTURE OTHR SPECIMN AEROBIC: CPT | Performed by: PHYSICIAN ASSISTANT

## 2023-11-14 PROCEDURE — 250N000013 HC RX MED GY IP 250 OP 250 PS 637: Performed by: INTERNAL MEDICINE

## 2023-11-14 PROCEDURE — 71046 X-RAY EXAM CHEST 2 VIEWS: CPT | Mod: 26 | Performed by: RADIOLOGY

## 2023-11-14 PROCEDURE — 36589 REMOVAL TUNNELED CV CATH: CPT | Performed by: PHYSICIAN ASSISTANT

## 2023-11-14 PROCEDURE — 120N000002 HC R&B MED SURG/OB UMMC

## 2023-11-14 PROCEDURE — 36415 COLL VENOUS BLD VENIPUNCTURE: CPT | Performed by: EMERGENCY MEDICINE

## 2023-11-14 PROCEDURE — 0JPTXXZ REMOVAL OF TUNNELED VASCULAR ACCESS DEVICE FROM TRUNK SUBCUTANEOUS TISSUE AND FASCIA, EXTERNAL APPROACH: ICD-10-PCS | Performed by: PHYSICIAN ASSISTANT

## 2023-11-14 PROCEDURE — 250N000009 HC RX 250: Performed by: PHARMACIST

## 2023-11-14 PROCEDURE — 3E0336Z INTRODUCTION OF NUTRITIONAL SUBSTANCE INTO PERIPHERAL VEIN, PERCUTANEOUS APPROACH: ICD-10-PCS | Performed by: INTERNAL MEDICINE

## 2023-11-14 PROCEDURE — 99223 1ST HOSP IP/OBS HIGH 75: CPT | Performed by: STUDENT IN AN ORGANIZED HEALTH CARE EDUCATION/TRAINING PROGRAM

## 2023-11-14 PROCEDURE — 87077 CULTURE AEROBIC IDENTIFY: CPT | Performed by: EMERGENCY MEDICINE

## 2023-11-14 PROCEDURE — 250N000011 HC RX IP 250 OP 636: Mod: JZ | Performed by: EMERGENCY MEDICINE

## 2023-11-14 PROCEDURE — 80053 COMPREHEN METABOLIC PANEL: CPT | Performed by: EMERGENCY MEDICINE

## 2023-11-14 PROCEDURE — 36589 REMOVAL TUNNELED CV CATH: CPT

## 2023-11-14 PROCEDURE — 99285 EMERGENCY DEPT VISIT HI MDM: CPT | Mod: 25 | Performed by: EMERGENCY MEDICINE

## 2023-11-14 RX ORDER — ENOXAPARIN SODIUM 100 MG/ML
80 INJECTION SUBCUTANEOUS 2 TIMES DAILY
Status: DISCONTINUED | OUTPATIENT
Start: 2023-11-14 | End: 2023-11-17 | Stop reason: HOSPADM

## 2023-11-14 RX ORDER — CEFTRIAXONE 1 G/1
1 INJECTION, POWDER, FOR SOLUTION INTRAMUSCULAR; INTRAVENOUS ONCE
Status: COMPLETED | OUTPATIENT
Start: 2023-11-14 | End: 2023-11-14

## 2023-11-14 RX ORDER — ALPRAZOLAM 0.5 MG
0.5 TABLET ORAL 2 TIMES DAILY PRN
Status: DISCONTINUED | OUTPATIENT
Start: 2023-11-14 | End: 2023-11-17 | Stop reason: HOSPADM

## 2023-11-14 RX ORDER — PANTOPRAZOLE SODIUM 40 MG/1
40 TABLET, DELAYED RELEASE ORAL DAILY
Status: DISCONTINUED | OUTPATIENT
Start: 2023-11-14 | End: 2023-11-17 | Stop reason: HOSPADM

## 2023-11-14 RX ORDER — DEXTROAMPHETAMINE SACCHARATE, AMPHETAMINE ASPARTATE, DEXTROAMPHETAMINE SULFATE AND AMPHETAMINE SULFATE 5; 5; 5; 5 MG/1; MG/1; MG/1; MG/1
20 TABLET ORAL DAILY
Status: DISCONTINUED | OUTPATIENT
Start: 2023-11-14 | End: 2023-11-17 | Stop reason: HOSPADM

## 2023-11-14 RX ORDER — ALBUTEROL SULFATE 90 UG/1
2 AEROSOL, METERED RESPIRATORY (INHALATION) EVERY 6 HOURS PRN
Status: DISCONTINUED | OUTPATIENT
Start: 2023-11-14 | End: 2023-11-17 | Stop reason: HOSPADM

## 2023-11-14 RX ORDER — LIDOCAINE 4 G/G
1 PATCH TOPICAL DAILY PRN
Status: ON HOLD | COMMUNITY
End: 2024-09-04

## 2023-11-14 RX ORDER — DEXTROSE MONOHYDRATE 100 MG/ML
INJECTION, SOLUTION INTRAVENOUS CONTINUOUS PRN
Status: DISCONTINUED | OUTPATIENT
Start: 2023-11-14 | End: 2023-11-17 | Stop reason: HOSPADM

## 2023-11-14 RX ORDER — ONDANSETRON 4 MG/1
4 TABLET, ORALLY DISINTEGRATING ORAL EVERY 8 HOURS PRN
Status: DISCONTINUED | OUTPATIENT
Start: 2023-11-14 | End: 2023-11-17 | Stop reason: HOSPADM

## 2023-11-14 RX ORDER — CARVEDILOL 12.5 MG/1
12.5 TABLET ORAL 2 TIMES DAILY WITH MEALS
Status: DISCONTINUED | OUTPATIENT
Start: 2023-11-14 | End: 2023-11-17 | Stop reason: HOSPADM

## 2023-11-14 RX ORDER — CEFTRIAXONE 2 G/1
2 INJECTION, POWDER, FOR SOLUTION INTRAMUSCULAR; INTRAVENOUS EVERY 24 HOURS
Status: DISCONTINUED | OUTPATIENT
Start: 2023-11-15 | End: 2023-11-17 | Stop reason: HOSPADM

## 2023-11-14 RX ORDER — ACETAMINOPHEN 325 MG/10.15ML
650 LIQUID ORAL EVERY 4 HOURS PRN
Status: DISCONTINUED | OUTPATIENT
Start: 2023-11-14 | End: 2023-11-14

## 2023-11-14 RX ORDER — FENTANYL 25 UG/1
25 PATCH TRANSDERMAL
Status: DISCONTINUED | OUTPATIENT
Start: 2023-11-14 | End: 2023-11-16

## 2023-11-14 RX ORDER — PREGABALIN 50 MG/1
50 CAPSULE ORAL 2 TIMES DAILY
Status: DISCONTINUED | OUTPATIENT
Start: 2023-11-14 | End: 2023-11-14

## 2023-11-14 RX ORDER — NYSTATIN 100000 U/G
CREAM TOPICAL 2 TIMES DAILY
Status: DISCONTINUED | OUTPATIENT
Start: 2023-11-14 | End: 2023-11-17 | Stop reason: HOSPADM

## 2023-11-14 RX ORDER — OXYCODONE HCL 5 MG/5 ML
10 SOLUTION, ORAL ORAL EVERY 4 HOURS PRN
Status: DISCONTINUED | OUTPATIENT
Start: 2023-11-14 | End: 2023-11-17 | Stop reason: HOSPADM

## 2023-11-14 RX ORDER — POLYETHYLENE GLYCOL 3350 17 G/17G
17 POWDER, FOR SOLUTION ORAL DAILY
Status: DISCONTINUED | OUTPATIENT
Start: 2023-11-14 | End: 2023-11-17 | Stop reason: HOSPADM

## 2023-11-14 RX ORDER — SUCRALFATE ORAL 1 G/10ML
1 SUSPENSION ORAL 3 TIMES DAILY PRN
Status: DISCONTINUED | OUTPATIENT
Start: 2023-11-14 | End: 2023-11-17 | Stop reason: HOSPADM

## 2023-11-14 RX ADMIN — ASCORBIC ACID, VITAMIN A PALMITATE, CHOLECALCIFEROL, THIAMINE HYDROCHLORIDE, RIBOFLAVIN-5 PHOSPHATE SODIUM, PYRIDOXINE HYDROCHLORIDE, NIACINAMIDE, DEXPANTHENOL, ALPHA-TOCOPHEROL ACETATE, VITAMIN K1, FOLIC ACID, BIOTIN, CYANOCOBALAMIN: 200; 3300; 200; 6; 3.6; 6; 40; 15; 10; 150; 600; 60; 5 INJECTION, SOLUTION INTRAVENOUS at 22:08

## 2023-11-14 RX ADMIN — PANTOPRAZOLE SODIUM 40 MG: 40 TABLET, DELAYED RELEASE ORAL at 08:35

## 2023-11-14 RX ADMIN — CEFTRIAXONE SODIUM 1 G: 1 INJECTION, POWDER, FOR SOLUTION INTRAMUSCULAR; INTRAVENOUS at 04:33

## 2023-11-14 RX ADMIN — OXYCODONE HYDROCHLORIDE 10 MG: 5 SOLUTION ORAL at 08:41

## 2023-11-14 RX ADMIN — ONDANSETRON 4 MG: 4 TABLET, ORALLY DISINTEGRATING ORAL at 23:52

## 2023-11-14 RX ADMIN — ACETAMINOPHEN 650 MG: 325 SOLUTION ORAL at 12:14

## 2023-11-14 RX ADMIN — SODIUM CHLORIDE 1000 ML: 9 INJECTION, SOLUTION INTRAVENOUS at 18:19

## 2023-11-14 RX ADMIN — FENTANYL 1 PATCH: 25 PATCH TRANSDERMAL at 08:33

## 2023-11-14 RX ADMIN — ENOXAPARIN SODIUM 80 MG: 80 INJECTION SUBCUTANEOUS at 08:45

## 2023-11-14 RX ADMIN — ALPRAZOLAM 0.5 MG: 0.5 TABLET ORAL at 22:06

## 2023-11-14 RX ADMIN — ALPRAZOLAM 0.5 MG: 0.5 TABLET ORAL at 08:40

## 2023-11-14 RX ADMIN — OXYCODONE HYDROCHLORIDE 10 MG: 5 SOLUTION ORAL at 22:05

## 2023-11-14 RX ADMIN — DEXTROAMPHETAMINE SACCHARATE, AMPHETAMINE ASPARTATE, DEXTROAMPHETAMINE SULFATE, AMPHETAMINE SULFATE TABLETS, 10 MG,CLL 20 MG: 2.5; 2.5; 2.5; 2.5 TABLET ORAL at 08:35

## 2023-11-14 RX ADMIN — ENOXAPARIN SODIUM 80 MG: 80 INJECTION SUBCUTANEOUS at 22:05

## 2023-11-14 RX ADMIN — OXYCODONE HYDROCHLORIDE 10 MG: 5 SOLUTION ORAL at 16:16

## 2023-11-14 ASSESSMENT — ACTIVITIES OF DAILY LIVING (ADL)
ADLS_ACUITY_SCORE: 37

## 2023-11-14 NOTE — PROGRESS NOTES
Brief Medicine note:    Patient transferred from [facility] to [facility] today without any notification of primary team on [place], and without primary team on [place] being aware he was on transfer list. As soon as [place] primary team noticed his room change and that he was physically located on [facility], SOC Triage MD was promptly notified to ensure patient was assigned to a Medicine team. Patient departed [facility] today before his [place] primary team could see him.    Shannon Puga MD

## 2023-11-14 NOTE — CONSULTS
Interventional Radiology  [facility] Consult Service Note  11/14/23   12:02 PM    Consult Requested: Tunneled CVC removal.    Recommendations/Plan:    Patient will be added to IR schedule on 11/15/23 for a left internal jugular tunneled CVC removal. Timing of procedure is TBD based on staffing/schedule and triage. If able, IR may remove catheter on 11/14/23.    This is a 53 year old male with history of short gut syndrome, with existing left internal jugular 6 Fr., dual lumen tunneled CVC. Catheter was last exchanged on 9/19/23. Patient's team requesting removal due to bacteremia.      Labs WNL for procedure.   Preprocedural orders entered, as well as orders for procedure. No NPO required.    Consent will be done prior to procedure.     Please contact the IR charge RN at [phone] for estimated time of procedure.  Recommendations were reviewed with requesting team.        Pertinent Imaging Reviewed: IR tunneled CVC exchange, 9/19/23.    Expected date of discharge:  TBD.    Vitals:   /83 (BP Location: Left arm)   Pulse 102   Temp (!) 102.2  F (39  C) (Oral)   Resp 18   SpO2 97%     Pertinent Labs:   Lab Results   Component Value Date    WBC 8.4 11/14/2023    WBC 7.4 11/11/2023    WBC 11.6 (H) 11/02/2023    WBC 6.9 06/29/2021    WBC 8.1 06/14/2021    WBC 7.5 06/09/2021     Lab Results   Component Value Date    HGB 9.6 11/14/2023    HGB 10.0 11/11/2023    HGB 9.4 11/02/2023    HGB 12.1 06/29/2021    HGB 12.0 06/14/2021    HGB 13.3 06/09/2021     Lab Results   Component Value Date     11/14/2023     11/11/2023     11/02/2023     06/29/2021     06/14/2021     06/09/2021     Lab Results   Component Value Date    INR 1.20 (H) 10/02/2023    INR 1.07 05/07/2021    PTT 25 09/29/2023    PTT 26 07/22/2019     Lab Results   Component Value Date    POTASSIUM 3.8 11/14/2023    POTASSIUM 3.8 12/13/2022    POTASSIUM 3.5 06/29/2021        COVID-19 Antibody  Results, Testing for Immunity         No data to display            COVID-19 PCR Results        1/8/2022    15:34 2/24/2022    01:49 4/9/2022    10:58 5/7/2022    14:52 7/7/2022    13:19 7/16/2022    22:19 8/9/2022    18:06 8/4/2023    04:27 11/11/2023    01:30   COVID-19 PCR Results   SARS CoV2 PCR Negative  Negative  Negative  Negative  Negative  Negative  Negative  Negative  Negative        Koko Enriquez PA-C  Interventional Radiology  Pager: [phone].

## 2023-11-14 NOTE — PROGRESS NOTES
Clinic Care Coordination Contact  Ambulatory Care Coordination to Inpatient Care Management   Hand-In Communication    Date:  November 14, 2023  Name: Parker Acevedo is enrolled in Ambulatory Care Coordination program and I am the Lead Care Coordinator.  CC Contact Information: Epic InPorterosket + phone  Payor Source: Payor: [facility] / Plan: [facility] MEDICARE ADVANTAGE / Product Type: Medicare /   Current services in place:     Please see the CC Snaphot and Care Management Flowsheets for specific  details of this Parker Acevedo care plan.   Additional details/specific concerns r/t this admission:    No additional concerns at this time .    I will follow this admission in Epic. Please feel free to contact me with questions or for further collaboration in discharge planning.    Kinsey Muhammad RN Care Coordination   [facility]  Email: [email or web address]  Phone: [phone]

## 2023-11-14 NOTE — ED TRIAGE NOTES
Triage Assessment (Adult)       Row Name 11/14/23 0411          Triage Assessment    Airway WDL WDL        Respiratory WDL    Respiratory WDL WDL        Cardiac WDL    Cardiac WDL WDL        Cognitive/Neuro/Behavioral WDL    Cognitive/Neuro/Behavioral WDL WDL

## 2023-11-14 NOTE — CONSULTS
[facility] Infectious Disease Consult Service - Red Team   Patient: Parker Acevedo, Date of birth 1970, Medical record number [id number].      Recommendations:     Continue ceftriaxone 2 g daily for line associated bacteremia, trend CBC, BMP  Due to failure of prior antibiotic lock therapy line must be removed if possible.  Would obtain transthoracic echo, probably will need transesophageal echo    ID Will continue to follow, please page us with questions or if situation arises where we may be of assistance.      Signed:  Uday Mitchell MD, 11/14/2023 Staff Physician, General Infectious Disease  Page Me @ [phone] or Secure Message me via [employer]     After hours for coverage see [facility] ->INFECTIOUS DISEASE MEDICINE ADULT/[facility] -> 1ST CALL [facility] GENERAL ID        Patient Summary:     Patient on TPN secondary to short gut syndrome with numerous recurrent line associated bacteremia's presents due to fever as well using a line and gram-negative bacteremia detected recently in the emergency room.         ID Problem List and Discussion:     CLABSI  Gram-negative bacteremia  Recurrent bacteremia  TPN dependent    Patient has had numerous episodes of line infections.  Some mono microbial some polymicrobial as well as yeast.  Most recent infection was in August with identical organism which was treated with antibiotic lock therapy.  Patient reports that diligence with line cares but potentially some contamination may have occurred during the time of his intussusception circa September 30.    The differential is fourfold:  1.  Failure of prior antibiotic lock therapy and ongoing infection since August  2.  Recurrent introduction of new Klebsiella on catheter  3.  Recurrence of bacteremia due to seeding on the site such as heart valves leading to recurrent endogenous infection  4.  Recurrent bacteremia due to unexplained Klebsiella infection either in urinary or gastrointestinal  source.    Due to apparent failure of prior antibiotic lock therapy line will need to be removed.  We can start with a transthoracic echo.  We will treat him with ceftriaxone.       Selected Labs, Micro, Imaging Results:     Blood cultures from 2023:  11/14-gram-negative bacilli  11/11-relatively susceptible Klebsiella pneumoniae  8/6 no growth  8/4 Klebsiella pneumoniae  7/5 no growth  7/4 Staph hominis and Staph epidermidis  6/5 no growth  6/4 Klebsiella oxytoca, Enterococcus faecalis, Klebsiella pneumoniae        History of Presenting Illness:     Parker Acevedo is a 53 year old patient who presented on 11/14/2023 for fevers, positive blood cultures.    He states that he has been having fevers every time he uses his line since approximately September after an admission for jejunal intussusception and having surgery.  He does note that nearly immediately after any flushing of the line he will have sweats chills and shakes.  This subsides after about 10 hours.  He has not had any specific drainage from the line but it has felt warm.  This has been ongoing.    He has not had any other localizing signs or symptoms of infection although he does have a chronic cough secondary to aspiration.  He has not had any urinary symptoms back pain.  Very sparse of bowel movements and has not having any diarrhea or change in his baseline abdominal symptoms since his surgery.  No joint pain or swelling.    He cannot recall any specific episode with the line was contaminated.      Review of Symptoms:  A comprehensive 14 system review of symptoms was conducted and was otherwise negative (unless mentioned above)       Other Medical History:     An attempt was made to collect past, family and social history during this encounter,  this information was reviewed with the patient and updated    Allergies Bactrim [sulfamethoxazole-trimethoprim], Penicillins, Ertapenem, Doxycycline, and Vancomycin    Past Medical History  Past Medical  History:   Diagnosis Date    ADHD (attention deficit hyperactivity disorder)     Anxiety     Cardiomyopathy in nutritional diseases (H)     mild EF ~45% on rest 2/13/17, improves with stressing    Chronic abdominal pain     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    Complication of anesthesia     Difficulty swallowing     Gastric ulcer, unspecified as acute or chronic, without mention of hemorrhage, perforation, or obstruction     Gastro-oesophageal reflux disease     Head injury     Hiatal hernia     Other bladder disorder     Other chronic pain     PONV (postoperative nausea and vomiting)     Severe malnutrition (H24)     TPN    Short gut syndrome     Tobacco abuse     PastSurgical History  Past Surgical History:   Procedure Laterality Date    AMPUTATION      APPENDECTOMY      BACK SURGERY  11/3/2014    curve in the spine    BIOPSY LYMPH NODE CERVICAL N/A 2/20/2015    Procedure: BIOPSY LYMPH NODE CERVICAL;  Surgeon: Baron Scanlon MD;  [facility]    CHOLECYSTECTOMY      COLONOSCOPY N/A 7/14/2021    Procedure: COLONOSCOPY;  Surgeon: Jimbo Estrada MD;  Location: [facility] OR    COLONOSCOPY N/A 4/13/2022    Procedure: COLONOSCOPY;  Surgeon: Jimbo Estrada MD;  Location: [facility] OR    COLONOSCOPY N/A 9/19/2023    Procedure: Colonoscopy;  Surgeon: Jimbo Estrada MD;  Location: [facility] OR    DISCECTOMY, FUSION CERVICAL ANTERIOR ONE LEVEL, COMBINED N/A 2/15/2017    Procedure: COMBINED DISCECTOMY, FUSION CERVICAL ANTERIOR ONE LEVEL;  Surgeon: Darren Campos MD;  [facility]    ENDOSCOPIC INSERTION TUBE GASTROSTOMY  9/9/2013    Procedure: ENDOSCOPIC INSERTION TUBE GASTROSTOMY;;  Surgeon: Francis Vyas MD;  [facility]    ENDOSCOPIC ULTRASOUND UPPER GASTROINTESTINAL TRACT (GI)  4/29/2011    Procedure:ENDOSCOPIC ULTRASOUND UPPER GASTROINTESTINAL TRACT (GI); Both Procedures done Conjointly; Surgeon:NEREIDA HOUSER; [facility]     ENDOSCOPIC ULTRASOUND UPPER GASTROINTESTINAL TRACT (GI)  9/9/2013    Procedure: ENDOSCOPIC ULTRASOUND UPPER GASTROINTESTINAL TRACT (GI);  Endoscopic Ultrasound Guide Gastrostomy Tube Placement  C-arm;  Surgeon: Noe Lizarraga MD;  [facility]    ENDOSCOPIC ULTRASOUND UPPER GASTROINTESTINAL TRACT (GI) N/A 2/24/2021    Procedure: ENDOSCOPIC ULTRASOUND, ESOPHAGOSCOPY / UPPER GASTROINTESTINAL TRACT (GI), esophagastrogastroduodenoscopy;  Surgeon: Berny Bach MD;  [facility]    ENDOSCOPY  03/25/11    EGD, [facility]    ENDOSCOPY  08/04/09    Upper Endoscopy, [facility]    ENDOSCOPY  01/05/09    Upper Endoscopy, [facility]    ESOPHAGOSCOPY, GASTROSCOPY, DUODENOSCOPY (EGD), COMBINED  4/20/2011    Procedure:COMBINED ESOPHAGOSCOPY, GASTROSCOPY, DUODENOSCOPY (EGD); Surgeon:BLU VYAS; [facility]    ESOPHAGOSCOPY, GASTROSCOPY, DUODENOSCOPY (EGD), COMBINED  6/15/2011    Procedure:COMBINED ESOPHAGOSCOPY, GASTROSCOPY, DUODENOSCOPY (EGD); Surgeon:BLU VYAS; [facility]    ESOPHAGOSCOPY, GASTROSCOPY, DUODENOSCOPY (EGD), COMBINED  6/12/2013    Procedure: COMBINED ESOPHAGOSCOPY, GASTROSCOPY, DUODENOSCOPY (EGD);;  Surgeon: Blu Vyas MD;  [facility]    ESOPHAGOSCOPY, GASTROSCOPY, DUODENOSCOPY (EGD), COMBINED  11/22/2013    Procedure: COMBINED ESOPHAGOSCOPY, GASTROSCOPY, DUODENOSCOPY (EGD);;  Surgeon: Blu Vyas MD;  [facility]    ESOPHAGOSCOPY, GASTROSCOPY, DUODENOSCOPY (EGD), COMBINED  4/30/2014    Procedure: COMBINED ESOPHAGOSCOPY, GASTROSCOPY, DUODENOSCOPY (EGD);  Surgeon: Blu Vyas MD;  [facility]    ESOPHAGOSCOPY, GASTROSCOPY, DUODENOSCOPY (EGD), COMBINED N/A 2/20/2015    Procedure: COMBINED ESOPHAGOSCOPY, GASTROSCOPY, DUODENOSCOPY (EGD), BIOPSY SINGLE OR MULTIPLE;  Surgeon: Baron Scanlon MD;  [facility]    ESOPHAGOSCOPY, GASTROSCOPY, DUODENOSCOPY (EGD), COMBINED N/A 9/30/2015    Procedure:  COMBINED ESOPHAGOSCOPY, GASTROSCOPY, DUODENOSCOPY (EGD);  Surgeon: Francis Vyas MD;  Location:  GI    ESOPHAGOSCOPY, GASTROSCOPY, DUODENOSCOPY (EGD), COMBINED N/A 10/3/2019    Procedure: Upper Endoscopy;  Surgeon: Clif Morrow MD;  [facility]    GASTRECTOMY  6/22/2012    Procedure: GASTRECTOMY;  Open Approach, Excise Ulcers,Partial Gastrectomy, Esophagojejunostomy, Hiatal Hernia Repair, Extensive Lysis of Adhesions and Esaphagogastrodudenoscopy.;  Surgeon: Francis Vyas MD;  [facility]    GASTROJEJUNOSTOMY  08/26/09    Extensice enterolysis, partial resect. jejunum, part. resect gastric pouch, gastrojejunostomy anastomosis    HC ESOPH/GAS REFLUX TEST W NASAL IMPED ELECTRODE  8/5/2013    Procedure: ESOPHAGEAL IMPEDENCE FUNCTION TEST 1 HOUR OR LESS;  Surgeon: Halie Lang MD;  Location:  GI    HEAD & NECK SURGERY  2/15/2017    C5-C6    HERNIA REPAIR  2006    Umbilical hernia    HERNIORRHAPHY HIATAL  6/22/2012    Procedure: HERNIORRHAPHY HIATAL;;  Surgeon: Francis Vyas MD;  [facility]    HERNIORRHAPHY INGUINAL  11/22/2013    Procedure: HERNIORRHAPHY INGUINAL;;  Surgeon: Francis Vyas MD;  [facility]    INSERT PICC LINE Right 12/19/2019    Procedure: Picc Placement;  Surgeon: Per Dumont PA-C;  [facility]    INSERT PICC LINE Right 2/21/2020    Procedure: INSERTION, PICC;  Surgeon: Per Dumont PA-C;  [facility]    INSERT PORT VASCULAR ACCESS Right 12/19/2017    Procedure: INSERT PORT VASCULAR ACCESS;  Right Chest Port Placement ;  Surgeon: Lisandro Alejandro PA-C;  [facility]    INSERT PORT VASCULAR ACCESS Right 8/2/2018    Procedure: INSERT PORT VASCULAR ACCESS;  Place single lumen tunneled central venous access catheter;  Surgeon: Guy Jamil PA-C;  [facility]    IR CVC TUNNEL PLACEMENT > 5 YRS OF AGE  8/7/2019    IR CVC TUNNEL PLACEMENT > 5 YRS OF AGE  4/14/2020    IR CVC TUNNEL PLACEMENT > 5  YRS OF AGE  8/3/2020    IR CVC TUNNEL PLACEMENT > 5 YRS OF AGE  9/4/2020    IR CVC TUNNEL PLACEMENT > 5 YRS OF AGE  2/5/2021    IR CVC TUNNEL PLACEMENT > 5 YRS OF AGE  3/23/2021    IR CVC TUNNEL PLACEMENT > 5 YRS OF AGE  1/13/2022    IR CVC TUNNEL PLACEMENT > 5 YRS OF AGE  5/12/2022    IR CVC TUNNEL PLACEMENT > 5 YRS OF AGE  7/13/2022    IR CVC TUNNEL PLACEMENT > 5 YRS OF AGE  7/10/2023    IR CVC TUNNEL REMOVAL LEFT  6/7/2023    IR CVC TUNNEL REMOVAL RIGHT  10/1/2019    IR CVC TUNNEL REMOVAL RIGHT  7/30/2020    IR CVC TUNNEL REMOVAL RIGHT  9/2/2020    IR CVC TUNNEL REMOVAL RIGHT  2/3/2021    IR CVC TUNNEL REMOVAL RIGHT  3/19/2021    IR CVC TUNNEL REMOVAL RIGHT  1/10/2022    IR CVC TUNNEL REMOVAL RIGHT  5/9/2022    IR CVC TUNNEL REMOVAL RIGHT  7/8/2022    IR CVC TUNNEL REVISION LEFT  8/10/2022    IR CVC TUNNEL REVISION LEFT  9/19/2023    IR CVC TUNNEL REVISION RIGHT  5/7/2021    IR FOLLOW UP VISIT OUTPATIENT  8/7/2019    IR PICC EXCHANGE LEFT  6/8/2023    IR PICC PLACEMENT > 5 YRS OF AGE  3/7/2019    IR PICC PLACEMENT > 5 YRS OF AGE  12/19/2019    IR PICC PLACEMENT > 5 YRS OF AGE  2/21/2020    IR PICC PLACEMENT > 5 YRS OF AGE  6/7/2023    LAPAROTOMY EXPLORATORY  11/22/2013    Procedure: LAPAROTOMY EXPLORATORY;  Exploratory Laparotomy, Upper Endoscopy, Left Inguinal Hernia Repair;  Surgeon: Francis Vyas MD;  [facility]    LAPAROTOMY EXPLORATORY N/A 9/30/2023    Procedure: Laparotomy exploratory, reduction of intussusception, resection of small bowel with primary anastamosis, upper endoscopy;  Surgeon: Delvis Mercado MD;  [facility]    ORTHOPEDIC SURGERY      PICC INSERTION Right 03/16/2017    5fr DL BioFlo PICC, 42cm (3cm external) in the R medial brachial vein w/ tip in the SVC RA junction.    PICC INSERTION Left 09/23/2017    5fr DL BioFlo PICC, 45cm (1cm external) in the L basilic vein w/ tip in the SVC RA junction.    PICC INSERTION Right 05/16/2019    5Fr - 43cm, Medial brachial vein, low  SVC    PICC INSERTION Right 10/02/2019    5Fr - 43cm (2cm external), basilic vein, low SVC    SHAYLEE EN Y BOWEL  2003    SOFT TISSUE SURGERY      THORACIC SURGERY      TONSILLECTOMY      TRANSESOPHAGEAL ECHOCARDIOGRAM INTRAOPERATIVE N/A 1/8/2019    Procedure: TRANSESOPHAGEAL ECHOCARDIOGRAM INTRAOPERATIVE;  Surgeon: GENERIC ANESTHESIA PROVIDER;  [facility]    TRANSESOPHAGEAL ECHOCARDIOGRAM INTRAOPERATIVE N/A 7/7/2023    Procedure: Transesophageal echocardiogram in the OR;  Surgeon: GENERIC ANESTHESIA PROVIDER;  [facility]    ZZC GASTRIC BYPASS,OBESE<100CM SHAYLEE-EN-Y  2002    lost 300 pounds      Family History  Family History   Problem Relation Age of Onset    Gastrointestinal Disease Mother         Crohns disease    Anxiety Disorder Mother     Thyroid Disease Mother         Grave's disease    Cancer Father         ear cancer-skin cancer/melanoma    Breast Cancer Maternal Grandmother     Macular Degeneration Maternal Grandfather     Anxiety Disorder Sister     Diabetes Maternal Uncle     Breast Cancer Other     Hypertension No family hx of     Hyperlipidemia No family hx of     Cerebrovascular Disease No family hx of     Prostate Cancer No family hx of     Depression No family hx of     Anesthesia Reaction No family hx of     Asthma No family hx of     Osteoporosis No family hx of     Genetic Disorder No family hx of     Obesity No family hx of     Mental Illness No family hx of     Substance Abuse No family hx of     Glaucoma No family hx of     Social History   reports that he has been smoking cigarettes. He has a 1.50 pack-year smoking history. He has never used smokeless tobacco. He reports that he does not drink alcohol and does not use drugs.  He   Notable Exposures  Per HPI Vaccination History:  Immunization History   Administered Date(s) Administered    COVID-19 Monovalent 18+ (Moderna) 07/12/2021, 08/09/2021    DTAP (<7y) 01/05/1973, 02/09/1973, 03/23/1973, 10/26/1974, 05/04/1978    Influenza (IIV3)  "PF 09/01/2010, 10/06/2011, 10/23/2012    Influenza Vaccine 18-64 (Flublok) 01/13/2023    Influenza Vaccine >6 months (Alfuria,Fluzone) 10/13/2009, 10/06/2011, 10/23/2012, 09/25/2013, 10/14/2014, 10/09/2015, 09/20/2016, 09/21/2017, 10/05/2018, 10/04/2019, 11/09/2020    MMR 01/18/1974    Pneumo Conj 13-V (2010&after) 09/20/2016    Pneumococcal 23 valent 05/29/2008, 08/03/2015    Poliovirus, inactivated (IPV) 01/05/1973, 03/23/1973, 10/26/1974, 05/04/1978    TD,PF 7+ (Tenivac) 08/07/1997, 10/02/2008, 01/23/2009    TDAP (Adacel,Boostrix) 10/02/2008    TDAP Vaccine (Adacel) 01/23/2009, 08/19/2019           Physical Exam:     Vital signs:  Temp: (!) 102.2  F (39  C) Temp src: Oral BP: 123/83 Pulse: 102   Resp: 18 SpO2: 97 % O2 Device: None (Room air)        Estimated body mass index is 25.24 kg/m  as calculated from the following:    Height as of 11/11/23: 1.803 m (5' 11\").    Weight as of 11/11/23: 82.1 kg (181 lb).      GENERAL APPEARANCE: Not in acute distress    PHYSICAL EXAM:  Eyes:     Extraocular eye movements grossly intact, no ptosis, no discharge, no scleral icterus.  Mouth, Throat:     Mucous membranes moist, pharynx normal without lesions.  Cardiovascular:    Inspection: No cyanosis, JVD not elevated.   Auscultation:  S1, S2 normal, regular rate and rhythm.  Respiratory:     Inspection: Not in respiratory distress, Chest expansion symmetrical.   Auscultation: Bibasilar crackles, no wheeze  Gastrointestinal:  Numerous scars ostomy  Musculoskeletal:     No elbow, wrist, knee or ankle tenderness, deformity or swelling. No quadriceps, calf or upper arm muscular tenderness noted.  Neurologic:     Higher Mental Function: Conversant, AOx4   Motor: Moving all 4 limbs in bed   Sensory: Crude touch intact in all 4 limbs  Psychiatric: Appropriate  Access: Left and chest wall tunneled line which is warm without significant redness or expressible purulence.  Skin:   Anicteric       Other Data:     MEDICATIONS   " amphetamine-dextroamphetamine  20 mg Oral Daily    [Held by provider] carvedilol  12.5 mg Oral BID w/meals    [START ON 11/15/2023] cefTRIAXone  2 g Intravenous Q24H    enoxaparin ANTICOAGULANT  80 mg Subcutaneous BID    fentaNYL  25 mcg Transdermal Q72H    And    fentaNYL   Transdermal Q8H CECILE    nystatin   Topical BID    pantoprazole  40 mg Oral Daily    polyethylene glycol  17 g Oral Daily       GENERAL LABS  Urine Studies     Recent Labs   Lab Test 11/11/23  0042 08/06/23  1056 06/05/23  0820 01/21/23  0916 07/16/22  2200   URINEPH 6.0 7.0 5.0 6.5 8.0*   NITRITE Negative Negative Negative Negative Negative   LEUKEST Negative Negative Negative Negative Negative   WBCU 1 1 4 0 1         MICROBIOLOGY    Last Culture results   Rapid Strep A Screen   Date Value Ref Range Status   07/29/2015   Final    NEGATIVE: No Group A streptococcal antigen detected by immunoassay, await   culture report.       Culture   Date Value Ref Range Status   11/14/2023 Positive on the 1st day of incubation (A)  Preliminary   11/14/2023 Gram negative bacilli (AA)  Preliminary     Comment:     1 of 2 bottles   11/11/2023 No growth after 3 days  Preliminary   11/11/2023 Positive on the 1st day of incubation (A)  Final   11/11/2023 Klebsiella pneumoniae (AA)  Final     Comment:     2 of 2 bottles   08/07/2023 No Growth  Final   08/07/2023 No Growth  Final   08/06/2023 No Growth  Final   08/06/2023 No Growth  Final   08/04/2023 Positive on the 1st day of incubation (A)  Final   08/04/2023 Klebsiella pneumoniae (AA)  Final     Comment:     2 of 2 bottles   08/04/2023 No Growth  Final   07/07/2023 No Growth  Final   07/06/2023 No Growth  Final   07/06/2023 No Growth  Final   07/06/2023 No Growth  Final   07/05/2023 No Growth  Final   07/05/2023 No Growth  Final   07/04/2023 50-75 CFU Staphylococcus hominis (A)  Final   07/04/2023 Positive on the 1st day of incubation (A)  Final   07/04/2023 Staphylococcus epidermidis (AA)  Final     Comment:      2 of 2 bottlesSusceptibilities done on previous cultures     Culture Micro   Date Value Ref Range Status   05/28/2021 No growth  Final   05/28/2021 No growth  Final   05/28/2021 No growth  Final   04/02/2021 No growth  Final   04/02/2021 No growth  Final   04/02/2021 No growth  Final   03/22/2021 No growth  Final   03/22/2021 No growth  Final   03/21/2021 No growth  Final   03/21/2021 No growth  Final     Escherichia coli   Date Value Ref Range Status   11/11/2023 Not Detected Not Detected Final         Virology:  Coronavirus-19 testing    Recent Labs   Lab Test 11/11/23  0130 08/04/23  0427 08/09/22  1806 07/16/22  2219 07/12/21  1023 05/05/21  0944   [id number] Negative Negative Negative Negative   < > Test received-See reflex to IDDL test SARS CoV2 (COVID-19) Virus RT-PCR  NEGATIVE   [id number]  --   --   --   --   --  Nasopharyngeal   [id number]  --   --   --   --   --  Nasopharyngeal    < > = values in this interval not displayed.     Respiratory virus (non-coronavirus-19) testing    Recent Labs   Lab Test 11/11/23  0130 07/07/22  1319 01/08/22  1858   IFLUA  --   --  Not Detected   INFZA Negative   < >  --    FLUAH1  --   --  Not Detected   AO2536  --   --  Not Detected   FLUAH3  --   --  Not Detected   IFLUB  --   --  Not Detected   INFZB Negative   < >  --    PIV1  --   --  Not Detected   PIV2  --   --  Not Detected   PIV3  --   --  Not Detected   PIV4  --   --  Not Detected   IRSV Negative   < >  --    RSVA  --   --  Not Detected   RSVB  --   --  Not Detected   HMPV  --   --  Not Detected   RHINEV  --   --  Not Detected   ADENOV  --   --  Not Detected   CORONA  --   --  Not Detected    < > = values in this interval not displayed.         IMAGING  Recent Results (from the past 48 hour(s))   XR Chest 2 Views    Narrative    EXAM: XR CHEST 2 VIEWS  LOCATION: [facility]  DATE: 11/14/2023    INDICATION: ? pneumonia  COMPARISON: 11/11/2023.    FINDINGS: Left  subclavian infusion catheter. No pneumothorax. The heart size is normal. The lungs are clear. There is no pleural effusion.      Impression    IMPRESSION: No acute abnormality.       ATTESTATION  I have reviewed labs/blood-work that are part of this patients present encounter in addition to historical and baseline values. The specific values are recorded in Epic and I have incorporated them and my interpretation as relevant into my assessment and plan as recorded.  I have reviewed radiology reports/EKGs/and other diagnostic studies that are part of this patients present encounter, in addition to historical and baseline results.  The specific reports are available in Epic and I have incorporated them into my assessment and plan as described above. Independently interpreted diagnostic study results are described above.  This dictation was prepared in part using Dragon recognition software.  As a result errors may occur. When identified these transcription errors have been corrected.  While every attempt is made to correct errors during dictation, errors may still exist.      Medical Decision Making  I saw and evaluated this patient on todays date as part of an E&M Encounter     1- Number and Complexity of Problems Addressed as part the Encounter  I addressed 1 acute or chronic illness or injury that poses a threat to life or bodily function bacteremia.  Gram-negative sepsis  2- Amount and/or Complexity of Data to Be Reviewed and Analyzed  I reviewed lab and imaging tests (CBC and others) & notes from primary team and nursing staff   I discussed the management of this patient with primary team  3- Risk of Complications and/or Morbidity or Mortality of Patient Management:  High due to treatment with IV antibiotics that did require at least weekly lab monitoring for toxicity.           Signature:     Uday Mitchell MD

## 2023-11-14 NOTE — H&P
[facility]    History and Physical - Medicine Service, [facility] TEAM        Date of Admission:  11/14/2023    Assessment & Plan      Parker Acevedo is a 53 year old male admitted on 11/14/2023. He has a PMH of Celestino-En-Y gastric bypass in 2002 c/b need for reoperation and short gut syndrome, severe malnutrition on chronic TPN with Cm, dysphagia, anxiety, recurrent CLABSI, and LUE non-purulent cellulitis and catheter-associated septic subclavian DVT (on Lovenox)  presenting with klebsiella bacteremia line infection being admitted for IV antibiotics as well as a line holiday.     Klebsiella Bacteremia  Central Line infection  Hx recurrent CLABSI  Chronic TPN  Indwelling Cm line  - Ceftriaxone, can adjust pending Bcx sensitivities  - Consult IR to coordinate line removal, line holiday for 48-72hr followed by line replacement  - Continue enoxaparin for now but likely hold prior to placing new central line  - Hold carvedilol iso bacteremia, likely can restart in coming days    Short gut syndrome  TPN dependent  Severe malnutrition  J tube  Reflux  - Pharmacy/nutrition consulted to start and manage TPN while admitted  - Continued home regimen (carafate, miralax, senna, zofran)     Chronic pain  - PTA Oxycodone 10mg q4h prn, fentanyl patch q3d, lidocaine patch, tylenol prn  - Pt prescribed lyrica but never took it after his last surgery      Catheter-associated septic subclavian DVT  - Continued Lovenox 80mg BID     Anemia  Baseline over past couple years appears to be around 10-11, likely in setting of chronic disease. Hgb on admission at 9.6      Anxiety: PTA lorazepam at bedtime  ADHD: PTA Adderall  Paroxysmal AFib: PTA carvedilol    Diet:  TPN as above  DVT Prophylaxis: Enoxaparin (Lovenox) SQ  Sanchez Catheter: Not present  Fluids: none  Lines: PRESENT             Cardiac Monitoring: None  Code Status:  Full    Clinically Significant Risk Factors Present on  "Admission               # Drug Induced Coagulation Defect: home medication list includes an anticoagulant medication         # Overweight: Estimated body mass index is 25.24 kg/m  as calculated from the following:    Height as of 11/11/23: 1.803 m (5' 11\").    Weight as of 11/11/23: 82.1 kg (181 lb).              Disposition Plan      Expected Discharge Date: 11/16/2023                The patient's care to be discussed in AM      Doe Carroll MD  Medicine Service, [facility]  Securely message with [employer] (more info)  Text page via [facility] Paging/Directory   See signed in provider for up to date coverage information  ______________________________________________________________________    Chief Complaint   Bacteremia     History is obtained from the patient    History of Present Illness   Parker Acevedo is a 53 year old male admitted on 11/14/2023. He has a PMH of Celestino-En-Y gastric bypass in 2002 c/b need for reoperation and short gut syndrome, severe malnutrition on chronic TPN with Cm, dysphagia, anxiety, recurrent CLABSI, and LUE non-purulent cellulitis and catheter-associated septic subclavian DVT (on Lovenox)  presenting with klebsiella bacteremia line infection being admitted for IV antibiotics as well as a line holiday.     Was admitted for reduction of small bowel intussusception and bowel resection 9/30/23 and two week later (~10/14) began noticing fevers and chills as well as a headache occurring anytime he ran TPN or fluid through his central line. This progressed and is similar to his previous CLABSI episodes so he presented to the ED.    Past Medical History    Past Medical History:   Diagnosis Date    ADHD (attention deficit hyperactivity disorder)     Anxiety     Cardiomyopathy in nutritional diseases (H)     mild EF ~45% on rest 2/13/17, improves with stressing    Chronic abdominal pain     CLABSI (central line-associated bloodstream " infection)     recurrent    Complication of anesthesia     Difficulty swallowing     Gastric ulcer, unspecified as acute or chronic, without mention of hemorrhage, perforation, or obstruction     Gastro-oesophageal reflux disease     Head injury     Hiatal hernia     Other bladder disorder     Other chronic pain     PONV (postoperative nausea and vomiting)     Severe malnutrition (H24)     TPN    Short gut syndrome     Tobacco abuse        Past Surgical History   Past Surgical History:   Procedure Laterality Date    AMPUTATION      APPENDECTOMY      BACK SURGERY  11/3/2014    curve in the spine    BIOPSY LYMPH NODE CERVICAL N/A 2/20/2015    Procedure: BIOPSY LYMPH NODE CERVICAL;  Surgeon: Baron Scanlon MD;  Location: [facility]    CHOLECYSTECTOMY      COLONOSCOPY N/A 7/14/2021    Procedure: COLONOSCOPY;  Surgeon: Jimbo Estrada MD;  Location: [facility] OR    COLONOSCOPY N/A 4/13/2022    Procedure: COLONOSCOPY;  Surgeon: Jimbo Estrada MD;  Location: [facility] OR    COLONOSCOPY N/A 9/19/2023    Procedure: Colonoscopy;  Surgeon: Jimbo Estrada MD;  Location: [facility] OR    DISCECTOMY, FUSION CERVICAL ANTERIOR ONE LEVEL, COMBINED N/A 2/15/2017    Procedure: COMBINED DISCECTOMY, FUSION CERVICAL ANTERIOR ONE LEVEL;  Surgeon: Darren Campos MD;  [facility]    ENDOSCOPIC INSERTION TUBE GASTROSTOMY  9/9/2013    Procedure: ENDOSCOPIC INSERTION TUBE GASTROSTOMY;;  Surgeon: Franics Vyas MD;  [facility]    ENDOSCOPIC ULTRASOUND UPPER GASTROINTESTINAL TRACT (GI)  4/29/2011    Procedure:ENDOSCOPIC ULTRASOUND UPPER GASTROINTESTINAL TRACT (GI); Both Procedures done Conjointly; Surgeon:NEREIDA HOUSER; [facility]    ENDOSCOPIC ULTRASOUND UPPER GASTROINTESTINAL TRACT (GI)  9/9/2013    Procedure: ENDOSCOPIC ULTRASOUND UPPER GASTROINTESTINAL TRACT (GI);  Endoscopic Ultrasound Guide Gastrostomy Tube Placement  C-arm;  Surgeon: Noe Lizarraga MD;  [facility]    ENDOSCOPIC ULTRASOUND  UPPER GASTROINTESTINAL TRACT (GI) N/A 2/24/2021    Procedure: ENDOSCOPIC ULTRASOUND, ESOPHAGOSCOPY / UPPER GASTROINTESTINAL TRACT (GI), esophagastrogastroduodenoscopy;  Surgeon: Berny Bach MD;  [facility]    ENDOSCOPY  03/25/11    EGD, [facility]    ENDOSCOPY  08/04/09    Upper Endoscopy, [facility]    ENDOSCOPY  01/05/09    Upper Endoscopy, [facility]    ESOPHAGOSCOPY, GASTROSCOPY, DUODENOSCOPY (EGD), COMBINED  4/20/2011    Procedure:COMBINED ESOPHAGOSCOPY, GASTROSCOPY, DUODENOSCOPY (EGD); Surgeon:BLU VYAS; [facility]    ESOPHAGOSCOPY, GASTROSCOPY, DUODENOSCOPY (EGD), COMBINED  6/15/2011    Procedure:COMBINED ESOPHAGOSCOPY, GASTROSCOPY, DUODENOSCOPY (EGD); Surgeon:BLU VYAS; Location: GI    ESOPHAGOSCOPY, GASTROSCOPY, DUODENOSCOPY (EGD), COMBINED  6/12/2013    Procedure: COMBINED ESOPHAGOSCOPY, GASTROSCOPY, DUODENOSCOPY (EGD);;  Surgeon: Blu Vyas MD;  Location:  GI    ESOPHAGOSCOPY, GASTROSCOPY, DUODENOSCOPY (EGD), COMBINED  11/22/2013    Procedure: COMBINED ESOPHAGOSCOPY, GASTROSCOPY, DUODENOSCOPY (EGD);;  Surgeon: Blu Vyas MD;  Location:  OR    ESOPHAGOSCOPY, GASTROSCOPY, DUODENOSCOPY (EGD), COMBINED  4/30/2014    Procedure: COMBINED ESOPHAGOSCOPY, GASTROSCOPY, DUODENOSCOPY (EGD);  Surgeon: Blu Vyas MD;  [facility]    ESOPHAGOSCOPY, GASTROSCOPY, DUODENOSCOPY (EGD), COMBINED N/A 2/20/2015    Procedure: COMBINED ESOPHAGOSCOPY, GASTROSCOPY, DUODENOSCOPY (EGD), BIOPSY SINGLE OR MULTIPLE;  Surgeon: Baron Scanlon MD;  Location:  OR    ESOPHAGOSCOPY, GASTROSCOPY, DUODENOSCOPY (EGD), COMBINED N/A 9/30/2015    Procedure: COMBINED ESOPHAGOSCOPY, GASTROSCOPY, DUODENOSCOPY (EGD);  Surgeon: Blu Vyas MD;  Location:  GI    ESOPHAGOSCOPY, GASTROSCOPY, DUODENOSCOPY (EGD), COMBINED N/A 10/3/2019    Procedure: Upper Endoscopy;  Surgeon: Clif Morrow MD;  Location:  OR     GASTRECTOMY  6/22/2012    Procedure: GASTRECTOMY;  Open Approach, Excise Ulcers,Partial Gastrectomy, Esophagojejunostomy, Hiatal Hernia Repair, Extensive Lysis of Adhesions and Esaphagogastrodudenoscopy.;  Surgeon: Francis Vyas MD;  [facility]    GASTROJEJUNOSTOMY  08/26/09    Extensice enterolysis, partial resect. jejunum, part. resect gastric pouch, gastrojejunostomy anastomosis    HC ESOPH/GAS REFLUX TEST W NASAL IMPED ELECTRODE  8/5/2013    Procedure: ESOPHAGEAL IMPEDENCE FUNCTION TEST 1 HOUR OR LESS;  Surgeon: Halie Lang MD;  [facility]    HEAD & NECK SURGERY  2/15/2017    C5-C6    HERNIA REPAIR  2006    Umbilical hernia    HERNIORRHAPHY HIATAL  6/22/2012    Procedure: HERNIORRHAPHY HIATAL;;  Surgeon: Francis Vyas MD;  [facility]    HERNIORRHAPHY INGUINAL  11/22/2013    Procedure: HERNIORRHAPHY INGUINAL;;  Surgeon: Francis Vyas MD;  [facility]    INSERT PICC LINE Right 12/19/2019    Procedure: Picc Placement;  Surgeon: Per Dumont PA-C;  [facility]    INSERT PICC LINE Right 2/21/2020    Procedure: INSERTION, PICC;  Surgeon: Per Dumont PA-C;  [facility]    INSERT PORT VASCULAR ACCESS Right 12/19/2017    Procedure: INSERT PORT VASCULAR ACCESS;  Right Chest Port Placement ;  Surgeon: Lisandro Alejandro PA-C;  [facility]    INSERT PORT VASCULAR ACCESS Right 8/2/2018    Procedure: INSERT PORT VASCULAR ACCESS;  Place single lumen tunneled central venous access catheter;  Surgeon: Guy Jamil PA-C;  [facility]    IR CVC TUNNEL PLACEMENT > 5 YRS OF AGE  8/7/2019    IR CVC TUNNEL PLACEMENT > 5 YRS OF AGE  4/14/2020    IR CVC TUNNEL PLACEMENT > 5 YRS OF AGE  8/3/2020    IR CVC TUNNEL PLACEMENT > 5 YRS OF AGE  9/4/2020    IR CVC TUNNEL PLACEMENT > 5 YRS OF AGE  2/5/2021    IR CVC TUNNEL PLACEMENT > 5 YRS OF AGE  3/23/2021    IR CVC TUNNEL PLACEMENT > 5 YRS OF AGE  1/13/2022    IR CVC TUNNEL PLACEMENT > 5 YRS OF  AGE  5/12/2022    IR CVC TUNNEL PLACEMENT > 5 YRS OF AGE  7/13/2022    IR CVC TUNNEL PLACEMENT > 5 YRS OF AGE  7/10/2023    IR CVC TUNNEL REMOVAL LEFT  6/7/2023    IR CVC TUNNEL REMOVAL RIGHT  10/1/2019    IR CVC TUNNEL REMOVAL RIGHT  7/30/2020    IR CVC TUNNEL REMOVAL RIGHT  9/2/2020    IR CVC TUNNEL REMOVAL RIGHT  2/3/2021    IR CVC TUNNEL REMOVAL RIGHT  3/19/2021    IR CVC TUNNEL REMOVAL RIGHT  1/10/2022    IR CVC TUNNEL REMOVAL RIGHT  5/9/2022    IR CVC TUNNEL REMOVAL RIGHT  7/8/2022    IR CVC TUNNEL REVISION LEFT  8/10/2022    IR CVC TUNNEL REVISION LEFT  9/19/2023    IR CVC TUNNEL REVISION RIGHT  5/7/2021    IR FOLLOW UP VISIT OUTPATIENT  8/7/2019    IR PICC EXCHANGE LEFT  6/8/2023    IR PICC PLACEMENT > 5 YRS OF AGE  3/7/2019    IR PICC PLACEMENT > 5 YRS OF AGE  12/19/2019    IR PICC PLACEMENT > 5 YRS OF AGE  2/21/2020    IR PICC PLACEMENT > 5 YRS OF AGE  6/7/2023    LAPAROTOMY EXPLORATORY  11/22/2013    Procedure: LAPAROTOMY EXPLORATORY;  Exploratory Laparotomy, Upper Endoscopy, Left Inguinal Hernia Repair;  Surgeon: Francis Vyas MD;  [facility]    LAPAROTOMY EXPLORATORY N/A 9/30/2023    Procedure: Laparotomy exploratory, reduction of intussusception, resection of small bowel with primary anastamosis, upper endoscopy;  Surgeon: Delvis Mercado MD;  [facility]    ORTHOPEDIC SURGERY      PICC INSERTION Right 03/16/2017    5fr DL BioFlo PICC, 42cm (3cm external) in the R medial brachial vein w/ tip in the SVC RA junction.    PICC INSERTION Left 09/23/2017    5fr DL BioFlo PICC, 45cm (1cm external) in the L basilic vein w/ tip in the SVC RA junction.    PICC INSERTION Right 05/16/2019    5Fr - 43cm, Medial brachial vein, low SVC    PICC INSERTION Right 10/02/2019    5Fr - 43cm (2cm external), basilic vein, low SVC    SHAYLEE EN Y BOWEL  2003    SOFT TISSUE SURGERY      THORACIC SURGERY      TONSILLECTOMY      TRANSESOPHAGEAL ECHOCARDIOGRAM INTRAOPERATIVE N/A 1/8/2019    Procedure:  "TRANSESOPHAGEAL ECHOCARDIOGRAM INTRAOPERATIVE;  Surgeon: GENERIC ANESTHESIA PROVIDER;  [facility]    TRANSESOPHAGEAL ECHOCARDIOGRAM INTRAOPERATIVE N/A 2023    Procedure: Transesophageal echocardiogram in the OR;  Surgeon: GENERIC ANESTHESIA PROVIDER;  [facility]    ZC GASTRIC BYPASS,OBESE<100CM SHAYLEE-EN-Y  2002    lost 300 pounds       Prior to Admission Medications   Prior to Admission Medications   Prescriptions Last Dose Informant Patient Reported? Taking?   ALPRAZolam (XANAX) 0.5 MG tablet   No No   Sig: Take 1 tablet (0.5 mg) by mouth 2 times daily as needed for sleep or anxiety   [facility]NA[facility]ED PATIENT  Spouse/Significant Other No No   Sig: Contact [facility] for patient specific medication information at [phone] on admission and discharge from the hospital.  Phones are answered 24 hours a day 7 days a week 365 days a year.    Providers - Choose \"CONTINUE HOME MED (no script)\" at discharge if patient treatment with home infusion will continue.   Syringe/Needle, Disp, (B-D ECLIPSE SYRINGE) 27G X 1/2\" 1 ML MISC  Spouse/Significant Other No No   Si Device every 30 days   albuterol (VENTOLIN HFA) 108 (90 Base) MCG/ACT inhaler  Spouse/Significant Other No No   Sig: Inhale 2 puffs into the lungs every 6 hours as needed   amphetamine-dextroamphetamine (ADDERALL) 20 MG tablet   No No   Sig: TAKE ONE TABLET BY MOUTH ONCE DAILY   bisacodyl (DULCOLAX) 5 MG EC tablet   No No   Sig: Two days prior to exam take two (2) tablets at 4pm. One day prior to exam take two (2) tablets at 4pm   carvedilol (COREG) 6.25 MG tablet   No No   Sig: TAKE 2 TABLETS (12.5 MG) BY MOUTH 2 TIMES DAILY WITH MEALS   cyanocobalamin (CYANOCOBALAMIN) 1000 MCG/ML injection  Spouse/Significant Other No No   Sig: INJECT 1 ML INTO THE MUSCLE EVERY 30 DAYS   enoxaparin ANTICOAGULANT (LOVENOX) 80 MG/0.8ML syringe   No No   Sig: INJECT THE CONTENTS OF ONE SYRINGE (80MG) UNDER THE SKIN TWO TIMES A DAY "   fentaNYL (DURAGESIC) 25 mcg/hr 72 hr patch   No No   Sig: Place 1 patch onto the skin every 48 hours Fill 11/27/23 and start 11/29/23. 30 day supply for chronic pain.   gabapentin (NEURONTIN) 100 MG capsule   No No   Sig: Take 1 capsule (100 mg) by mouth 3 times daily for 7 days   lipids plant base (CLINOLIPID) 20 % infusion   No No   Sig: Inject 250 mLs into the vein every 24 hours   naloxone (NARCAN) 4 MG/0.1ML nasal spray   No No   Sig: Spray 1 spray (4 mg) into one nostril alternating nostrils once as needed for opioid reversal every 2-3 minutes until assistance arrives   naloxone (NARCAN) 4 MG/0.1ML nasal spray   No No   Sig: Spray 1 spray (4 mg) into one nostril alternating nostrils as needed for opioid reversal every 2-3 minutes until assistance arrives   nystatin (MYCOSTATIN) 801592 UNIT/GM external cream   No No   Sig: Apply topically 2 times daily   ondansetron (ZOFRAN ODT) 4 MG ODT tab   No No   Sig: Take 1 tablet (4 mg) by mouth every 8 hours as needed for nausea   ondansetron (ZOFRAN ODT) 8 MG ODT tab   No No   Sig: DISSOLVE ONE TABLET ON TONGUE EVERY 8 HOURS AS NEEDED FOR NAUSEA   ondansetron (ZOFRAN) 4 MG tablet   No No   Sig: Take one tablet every six hours for nausea during colonoscopy bowel prepping   oxyCODONE (ROXICODONE) 5 MG/5ML solution   No No   Sig: Take 5 mLs (5 mg) by mouth every 6 hours as needed for moderate pain or severe pain (5 mg for moderate pain; 10 mg for severe pain)   oxyCODONE (ROXICODONE) 5 MG/5ML solution   No No   Sig: Take 5-10 mLs (5-10 mg) by mouth every 4 hours as needed for moderate to severe pain Max of 40mg/day. Fill 11/27/23 and start 11/29/23. 30 day supply for chronic pain.   pantoprazole (PROTONIX) 40 MG EC tablet   No No   Sig: TAKE 1 TABLET (40 MG) BY MOUTH DAILY   polyethylene glycol (GOLYTELY) 236 g suspension   No No   Sig: Take as directed. Two days before your exam fill the first container with water. Cover and shake until mixed well. At 5:00pm drink one  8oz glass every 10-15 minutes until half (1/2) of the first container is empty. Store the remainder in the refrigerator. One day before your exam at 5:00pm drink the second half of the first container until it is gone. Before you go to bed mix the second container with water and put in refrigerator. Six hours before your check in time drink one 8oz glass every 10-15 minutes until half of container is empty. Discard the remainder of solution.   polyethylene glycol (MIRALAX) 17 GM/Dose powder   No No   Sig: Take 17 g by mouth daily   pregabalin (LYRICA) 25 MG capsule   No No   Sig: Take 25mg at bedtime for 7 days, then 25mg twice daily for 7 days, then 25mg in the AM and 50mg at bedtime for 7 days, then take 50mg twice daily. If side effects, then reduce to last tolerable dosage.   sucralfate (CARAFATE) 1 GM/10ML suspension   No No   Sig: TAKE 10MLS  BY MOUTH FOUR TIMES A DAY AS NEEDED   vitamin D2 (ERGOCALCIFEROL) 14907 units (1250 mcg) capsule  Spouse/Significant Other No No   Sig: Take 1 capsule (50,000 Units) by mouth once a week      Facility-Administered Medications: None           Physical Exam   Vital Signs: Temp: 98.4  F (36.9  C) Temp src: Temporal BP: 115/72 Pulse: 69   Resp: 15 SpO2: 99 % O2 Device: None (Room air)    Weight: 0 lbs 0 oz    Constitutional: awake, alert, cooperative, no apparent distress, and appears stated age  Respiratory: No increased work of breathing, good air exchange, clear to auscultation bilaterally, no crackles or wheezing  Cardiovascular: Normal apical impulse, regular rate and rhythm, normal S1 and S2, no S3 or S4, and no murmur noted  GI: No scars, normal bowel sounds, soft, non-distended, non-tender, no masses palpated, no hepatosplenomegally  Skin: Central line catheter in place, non-erythematous, non-purulent  Neurologic: Awake, alert, oriented to name, place and time.  Cranial nerves II-XII are grossly intact.    Medical Decision Making       Please see A&P for additional  details of medical decision making.      Data     I have personally reviewed the following data over the past 24 hrs:    8.4  \   9.6 (L)   / 291     138 105 15.0 /  95   3.8 24 0.81 \     ALT: 7 AST: 11 AP: 61 TBILI: 0.2   ALB: 3.7 TOT PROTEIN: 6.8 LIPASE: N/A       Imaging results reviewed over the past 24 hrs:   Recent Results (from the past 24 hour(s))   XR Chest 2 Views    Narrative    EXAM: XR CHEST 2 VIEWS  LOCATION: [facility]  DATE: 11/14/2023    INDICATION: ? pneumonia  COMPARISON: 11/11/2023.    FINDINGS: Left subclavian infusion catheter. No pneumothorax. The heart size is normal. The lungs are clear. There is no pleural effusion.      Impression    IMPRESSION: No acute abnormality.

## 2023-11-14 NOTE — ED PROVIDER NOTES
ED Provider Note  [facility]      History     Chief Complaint   Patient presents with    Abnormal Labs     Pt told to come into ED for positive blood cultures.  Pt reports sweats and fever pta.      HPI  Parker Acevedo is a 53 year old male who is presenting due to positive blood cultures.  He gets recurrent fevers and chills when he uses a line for TPN infusions.  Denies vomiting or pain.  He says he thinks he may have a pneumonia but is not on antibiotics.  The patient denies any rashes.  No belly pain or urinary symptoms.  Denies diarrhea.  Denies recent antibiotics.  No headaches or neck pain.  Denies pharyngitis or runny nose.    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    Complication of anesthesia     Difficulty swallowing     Gastric ulcer, unspecified as acute or chronic, without mention of hemorrhage, perforation, or obstruction     Gastro-oesophageal reflux disease     Head injury     Hiatal hernia     Other bladder disorder     Other chronic pain     PONV (postoperative nausea and vomiting)     Severe malnutrition (H24)     TPN    Short gut syndrome     Tobacco abuse      Past Surgical History:   Procedure Laterality Date    AMPUTATION      APPENDECTOMY      BACK SURGERY  11/3/2014    curve in the spine    BIOPSY LYMPH NODE CERVICAL N/A 2/20/2015    Procedure: BIOPSY LYMPH NODE CERVICAL;  Surgeon: Baron Scanlon MD;  Location:  OR    CHOLECYSTECTOMY      COLONOSCOPY N/A 7/14/2021    Procedure: COLONOSCOPY;  Surgeon: Jimbo Estrada MD;  Location: [facility] OR    COLONOSCOPY N/A 4/13/2022    Procedure: COLONOSCOPY;  Surgeon: Jimbo Estrada MD;  [place] [facility] [place]     COLONOSCOPY N/A 9/19/2023    Procedure: Colonoscopy;  Surgeon: Jimbo Estrada MD;  Location: [facility] OR    DISCECTOMY, FUSION CERVICAL ANTERIOR ONE LEVEL, COMBINED N/A 2/15/2017    Procedure: COMBINED DISCECTOMY, FUSION CERVICAL ANTERIOR ONE LEVEL;  Surgeon: Darren Campos MD;  Location: [facility] OR    ENDOSCOPIC INSERTION TUBE GASTROSTOMY  9/9/2013    Procedure: ENDOSCOPIC INSERTION TUBE GASTROSTOMY;;  Surgeon: Francis Vyas MD;  [facility]: [facility]    ENDOSCOPIC ULTRASOUND UPPER GASTROINTESTINAL TRACT (GI)  4/29/2011    Procedure:ENDOSCOPIC ULTRASOUND UPPER GASTROINTESTINAL TRACT (GI); Both Procedures done Conjointly; Surgeon:NEREIDA HOUSER; Location:[facility]    ENDOSCOPIC ULTRASOUND UPPER GASTROINTESTINAL TRACT (GI)  9/9/2013    Procedure: ENDOSCOPIC ULTRASOUND UPPER GASTROINTESTINAL TRACT (GI);  Endoscopic Ultrasound Guide Gastrostomy Tube Placement  C-arm;  Surgeon: Noe Lizarraga MD;  [facility]: [facility] OR    ENDOSCOPIC ULTRASOUND UPPER GASTROINTESTINAL TRACT (GI) N/A 2/24/2021    Procedure: ENDOSCOPIC ULTRASOUND, ESOPHAGOSCOPY / UPPER GASTROINTESTINAL TRACT (GI), esophagastrogastroduodenoscopy;  Surgeon: Berny Bach MD;  [facility] OR    ENDOSCOPY  03/25/11    EGD, [facility]    ENDOSCOPY  08/04/09    Upper Endoscopy, [facility]    ENDOSCOPY  01/05/09    Upper Endoscopy, [facility]    ESOPHAGOSCOPY, GASTROSCOPY, DUODENOSCOPY (EGD), COMBINED  4/20/2011    Procedure:COMBINED ESOPHAGOSCOPY, GASTROSCOPY, DUODENOSCOPY (EGD); Surgeon:FRANCIS VYAS; [facility]    ESOPHAGOSCOPY, GASTROSCOPY, DUODENOSCOPY (EGD), COMBINED  6/15/2011    Procedure:COMBINED ESOPHAGOSCOPY, GASTROSCOPY, DUODENOSCOPY (EGD); Surgeon:FRANCIS VYAS; [facility]    ESOPHAGOSCOPY, GASTROSCOPY, DUODENOSCOPY (EGD), COMBINED  6/12/2013    Procedure: COMBINED ESOPHAGOSCOPY, GASTROSCOPY, DUODENOSCOPY (EGD);;  Surgeon: Francis Vyas MD;  [facility]    ESOPHAGOSCOPY,  GASTROSCOPY, DUODENOSCOPY (EGD), COMBINED  11/22/2013    Procedure: COMBINED ESOPHAGOSCOPY, GASTROSCOPY, DUODENOSCOPY (EGD);;  Surgeon: Francis Vyas MD;  [facility]    ESOPHAGOSCOPY, GASTROSCOPY, DUODENOSCOPY (EGD), COMBINED  4/30/2014    Procedure: COMBINED ESOPHAGOSCOPY, GASTROSCOPY, DUODENOSCOPY (EGD);  Surgeon: Francis Vyas MD;  Location:  GI    ESOPHAGOSCOPY, GASTROSCOPY, DUODENOSCOPY (EGD), COMBINED N/A 2/20/2015    Procedure: COMBINED ESOPHAGOSCOPY, GASTROSCOPY, DUODENOSCOPY (EGD), BIOPSY SINGLE OR MULTIPLE;  Surgeon: Baron Scanlon MD;  Location:  OR    ESOPHAGOSCOPY, GASTROSCOPY, DUODENOSCOPY (EGD), COMBINED N/A 9/30/2015    Procedure: COMBINED ESOPHAGOSCOPY, GASTROSCOPY, DUODENOSCOPY (EGD);  Surgeon: Francis Vyas MD;  Location:  GI    ESOPHAGOSCOPY, GASTROSCOPY, DUODENOSCOPY (EGD), COMBINED N/A 10/3/2019    Procedure: Upper Endoscopy;  Surgeon: Clif Morrow MD;  Location:  OR    GASTRECTOMY  6/22/2012    Procedure: GASTRECTOMY;  Open Approach, Excise Ulcers,Partial Gastrectomy, Esophagojejunostomy, Hiatal Hernia Repair, Extensive Lysis of Adhesions and Esaphagogastrodudenoscopy.;  Surgeon: Francis Vyas MD;  [facility]    GASTROJEJUNOSTOMY  08/26/09    Extensice enterolysis, partial resect. jejunum, part. resect gastric pouch, gastrojejunostomy anastomosis    HC ESOPH/GAS REFLUX TEST W NASAL IMPED ELECTRODE  8/5/2013    Procedure: ESOPHAGEAL IMPEDENCE FUNCTION TEST 1 HOUR OR LESS;  Surgeon: Halie Lang MD;  Location:  GI    HEAD & NECK SURGERY  2/15/2017    C5-C6    HERNIA REPAIR  2006    Umbilical hernia    HERNIORRHAPHY HIATAL  6/22/2012    Procedure: HERNIORRHAPHY HIATAL;;  Surgeon: Francis Vyas MD;  Location:  OR    HERNIORRHAPHY INGUINAL  11/22/2013    Procedure: HERNIORRHAPHY INGUINAL;;  Surgeon: Francis Vyas MD;  [facility]    INSERT PICC LINE Right 12/19/2019    Procedure: Picc Placement;  Surgeon:  Per Dumont PA-C;  [facility]    INSERT PICC LINE Right 2/21/2020    Procedure: INSERTION, PICC;  Surgeon: Per Dumont PA-C;  [facility]    INSERT PORT VASCULAR ACCESS Right 12/19/2017    Procedure: INSERT PORT VASCULAR ACCESS;  Right Chest Port Placement ;  Surgeon: Lisandro Alejandro PA-C;  [facility]    INSERT PORT VASCULAR ACCESS Right 8/2/2018    Procedure: INSERT PORT VASCULAR ACCESS;  Place single lumen tunneled central venous access catheter;  Surgeon: Guy Jamil PA-C;  [facility]    IR CVC TUNNEL PLACEMENT > 5 YRS OF AGE  8/7/2019    IR CVC TUNNEL PLACEMENT > 5 YRS OF AGE  4/14/2020    IR CVC TUNNEL PLACEMENT > 5 YRS OF AGE  8/3/2020    IR CVC TUNNEL PLACEMENT > 5 YRS OF AGE  9/4/2020    IR CVC TUNNEL PLACEMENT > 5 YRS OF AGE  2/5/2021    IR CVC TUNNEL PLACEMENT > 5 YRS OF AGE  3/23/2021    IR CVC TUNNEL PLACEMENT > 5 YRS OF AGE  1/13/2022    IR CVC TUNNEL PLACEMENT > 5 YRS OF AGE  5/12/2022    IR CVC TUNNEL PLACEMENT > 5 YRS OF AGE  7/13/2022    IR CVC TUNNEL PLACEMENT > 5 YRS OF AGE  7/10/2023    IR CVC TUNNEL REMOVAL LEFT  6/7/2023    IR CVC TUNNEL REMOVAL RIGHT  10/1/2019    IR CVC TUNNEL REMOVAL RIGHT  7/30/2020    IR CVC TUNNEL REMOVAL RIGHT  9/2/2020    IR CVC TUNNEL REMOVAL RIGHT  2/3/2021    IR CVC TUNNEL REMOVAL RIGHT  3/19/2021    IR CVC TUNNEL REMOVAL RIGHT  1/10/2022    IR CVC TUNNEL REMOVAL RIGHT  5/9/2022    IR CVC TUNNEL REMOVAL RIGHT  7/8/2022    IR CVC TUNNEL REVISION LEFT  8/10/2022    IR CVC TUNNEL REVISION LEFT  9/19/2023    IR CVC TUNNEL REVISION RIGHT  5/7/2021    IR FOLLOW UP VISIT OUTPATIENT  8/7/2019    IR PICC EXCHANGE LEFT  6/8/2023    IR PICC PLACEMENT > 5 YRS OF AGE  3/7/2019    IR PICC PLACEMENT > 5 YRS OF AGE  12/19/2019    IR PICC PLACEMENT > 5 YRS OF AGE  2/21/2020    IR PICC PLACEMENT > 5 YRS OF AGE  6/7/2023    LAPAROTOMY EXPLORATORY  11/22/2013    Procedure: LAPAROTOMY EXPLORATORY;  Exploratory Laparotomy, Upper  Endoscopy, Left Inguinal Hernia Repair;  Surgeon: Francis Vyas MD;  [facility]    LAPAROTOMY EXPLORATORY N/A 9/30/2023    Procedure: Laparotomy exploratory, reduction of intussusception, resection of small bowel with primary anastamosis, upper endoscopy;  Surgeon: Delvis Mercado MD;  [facility]    ORTHOPEDIC SURGERY      PICC INSERTION Right 03/16/2017    5fr DL BioFlo PICC, 42cm (3cm external) in the R medial brachial vein w/ tip in the SVC RA junction.    PICC INSERTION Left 09/23/2017    5fr DL BioFlo PICC, 45cm (1cm external) in the L basilic vein w/ tip in the SVC RA junction.    PICC INSERTION Right 05/16/2019    5Fr - 43cm, Medial brachial vein, low SVC    PICC INSERTION Right 10/02/2019    5Fr - 43cm (2cm external), basilic vein, low SVC    SHAYLEE EN Y BOWEL  2003    SOFT TISSUE SURGERY      THORACIC SURGERY      TONSILLECTOMY      TRANSESOPHAGEAL ECHOCARDIOGRAM INTRAOPERATIVE N/A 1/8/2019    Procedure: TRANSESOPHAGEAL ECHOCARDIOGRAM INTRAOPERATIVE;  Surgeon: GENERIC ANESTHESIA PROVIDER;  [facility]    TRANSESOPHAGEAL ECHOCARDIOGRAM INTRAOPERATIVE N/A 7/7/2023    Procedure: Transesophageal echocardiogram in the OR;  Surgeon: GENERIC ANESTHESIA PROVIDER;  Location[facility]    ZZC GASTRIC BYPASS,OBESE<100CM SHAYLEE-EN-Y  2002    lost 300 pounds     albuterol (VENTOLIN HFA) 108 (90 Base) MCG/ACT inhaler  ALPRAZolam (XANAX) 0.5 MG tablet  amphetamine-dextroamphetamine (ADDERALL) 20 MG tablet  bisacodyl (DULCOLAX) 5 MG EC tablet  carvedilol (COREG) 6.25 MG tablet  cyanocobalamin (CYANOCOBALAMIN) 1000 MCG/ML injection  enoxaparin ANTICOAGULANT (LOVENOX) 80 MG/0.8ML syringe  [facility] MANAGED PATIENT  fentaNYL (DURAGESIC) 25 mcg/hr 72 hr patch  gabapentin (NEURONTIN) 100 MG capsule  lipids plant base (CLINOLIPID) 20 % infusion  naloxone (NARCAN) 4 MG/0.1ML nasal spray  naloxone (NARCAN) 4 MG/0.1ML nasal spray  nystatin (MYCOSTATIN) 980621 UNIT/GM external cream  ondansetron  "(ZOFRAN ODT) 4 MG ODT tab  ondansetron (ZOFRAN ODT) 8 MG ODT tab  ondansetron (ZOFRAN) 4 MG tablet  oxyCODONE (ROXICODONE) 5 MG/5ML solution  oxyCODONE (ROXICODONE) 5 MG/5ML solution  pantoprazole (PROTONIX) 40 MG EC tablet  polyethylene glycol (GOLYTELY) 236 g suspension  polyethylene glycol (MIRALAX) 17 GM/Dose powder  pregabalin (LYRICA) 25 MG capsule  sucralfate (CARAFATE) 1 GM/10ML suspension  Syringe/Needle, Disp, (B-D ECLIPSE SYRINGE) 27G X 1/2\" 1 ML MISC  vitamin D2 (ERGOCALCIFEROL) 23873 units (1250 mcg) capsule      Allergies   Allergen Reactions    Bactrim [Sulfamethoxazole-Trimethoprim] Rash    Penicillins Anaphylaxis     Please see Antimicrobial Management Team allergy assessment note 10/10/2018. Patient reported tolerating amoxicillin.  Tolerating cefepime and ceftriaxone without reaction 6/23    Ertapenem Nausea and Vomiting    Doxycycline Rash    Vancomycin Rash     Rash after receiving vancomycin 3/28/16 (infusion reaction?). Tolerated with slower infusion and diphenhydramine premed.  Tolerated 1250mg over 90minutes 7/2023.     Family History  Family History   Problem Relation Age of Onset    Gastrointestinal Disease Mother         Crohns disease    Anxiety Disorder Mother     Thyroid Disease Mother         Grave's disease    Cancer Father         ear cancer-skin cancer/melanoma    Breast Cancer Maternal Grandmother     Macular Degeneration Maternal Grandfather     Anxiety Disorder Sister     Diabetes Maternal Uncle     Breast Cancer Other     Hypertension No family hx of     Hyperlipidemia No family hx of     Cerebrovascular Disease No family hx of     Prostate Cancer No family hx of     Depression No family hx of     Anesthesia Reaction No family hx of     Asthma No family hx of     Osteoporosis No family hx of     Genetic Disorder No family hx of     Obesity No family hx of     Mental Illness No family hx of     Substance Abuse No family hx of     Glaucoma No family hx of      Social History "   Social History     Tobacco Use    Smoking status: Light Smoker     Packs/day: 0.50     Years: 3.00     Additional pack years: 0.00     Total pack years: 1.50     Types: Cigarettes    Smokeless tobacco: Never    Tobacco comments:     2/4/2021    smokes 3 cigarettes/day   Vaping Use    Vaping Use: Never used   Substance Use Topics    Alcohol use: No     Comment: quit 2002    Drug use: No         A medically appropriate review of systems was performed with pertinent positives and negatives noted in the HPI, and all other systems negative.    Physical Exam   BP: 115/72  Pulse: 69  Temp: 98.4  F (36.9  C)  Resp: 15  SpO2: 99 %  Physical Exam  Physical Exam   Constitutional: oriented to person, place, and time. appears well-developed and well-nourished.   HENT:   Head: Normocephalic and atraumatic.   Neck: Normal range of motion.   Pulmonary/Chest: Effort normal. No respiratory distress.  CVC line without surrounding erythema or tenderness  Cardiac: No murmurs, rubs, gallops. RRR.  Abdominal: Abdomen soft, nontender, nondistended. No rebound tenderness.  MSK: Long bones without deformity or evidence of trauma  Neurological: alert and oriented to person, place, and time.   Skin: Skin is warm and dry.   Psychiatric:  normal mood and affect.  behavior is normal. Thought content normal.       ED Course, Procedures, & Data      Procedures            No results found for any visits on 11/14/23.  Medications   cefTRIAXone (ROCEPHIN) 1 g vial to attach to  mL bag for ADULTS or NS 50 mL bag for PEDS (has no administration in time range)     Labs Ordered and Resulted from Time of ED Arrival to Time of ED Departure - No data to display  XR Chest 2 Views    (Results Pending)          Critical care was not performed.     Medical Decision Making  The patient's presentation was of high complexity (an acute health issue posing potential threat to life or bodily function).    The patient's evaluation involved:  review of external  note(s) from 1 sources (emergency department visit from 3 days ago)  review of 1 test result(s) ordered prior to this encounter (blood culture results)  ordering and/or review of 3+ test(s) in this encounter (see separate area of note for details)  discussion of management or test interpretation with another health professional (hospitalist)    The patient's management necessitated high risk (a decision regarding hospitalization).    Assessment & Plan    MDM  Patient presenting due to having positive blood cultures.  He is still getting recurrent fevers and chills with using his TPN.  His vitals here are normal.  I reviewed his culture results which show Klebsiella pneumonia.  It does appear Rocephin is susceptible so we will start this here in the emergency department and have him admitted to the hospital for likely line replacement and further management    I have reviewed the nursing notes. I have reviewed the findings, diagnosis, plan and need for follow up with the patient.    New Prescriptions    No medications on file       Final diagnoses:   Bacteremia       Koko Qureshi  [facility] EMERGENCY DEPARTMENT  11/14/2023     Koko Qureshi MD  11/14/23 0418

## 2023-11-14 NOTE — PHARMACY-ADMISSION MEDICATION HISTORY
"Pharmacist Admission Medication History    Admission medication history is complete. The information provided in this note is only as accurate as the sources available at the time of the update.    Information Source(s): Patient via in-person, medication fill history    Pertinent Information: Pregabalin: patient has not started it yet.     Changes made to PTA medication list:  Added: Lidocaine patch  Deleted: Bisacodyl tab, Gabapentin, Naloxone duplicate, Ondansetron duplicate, Oxycodone duplicate and Golytely suspension  Changed: Lipids 250 mL IV q24h >> every MWF  Polyethylene glycol 17g po daily >> daily prn    Medication Affordability: N/A       Allergies reviewed with patient and updates made in EHR: yes    Medication History Completed By: Dane Meraz [facility] 11/14/2023 5:47 PM    PTA Med List   Medication Sig Last Dose    albuterol (VENTOLIN HFA) 108 (90 Base) MCG/ACT inhaler Inhale 2 puffs into the lungs every 6 hours as needed Past Month    ALPRAZolam (XANAX) 0.5 MG tablet Take 1 tablet (0.5 mg) by mouth 2 times daily as needed for sleep or anxiety 11/13/2023    amphetamine-dextroamphetamine (ADDERALL) 20 MG tablet TAKE ONE TABLET BY MOUTH ONCE DAILY 11/13/2023    carvedilol (COREG) 6.25 MG tablet TAKE 2 TABLETS (12.5 MG) BY MOUTH 2 TIMES DAILY WITH MEALS 11/13/2023    cyanocobalamin (CYANOCOBALAMIN) 1000 MCG/ML injection INJECT 1 ML INTO THE MUSCLE EVERY 30 DAYS 10/10/2023    enoxaparin ANTICOAGULANT (LOVENOX) 80 MG/0.8ML syringe INJECT THE CONTENTS OF ONE SYRINGE (80MG) UNDER THE SKIN TWO TIMES A DAY 11/13/2023    [facility] INFUSION MANAGED PATIENT Contact [facility] for patient specific medication information at [phone] on admission and discharge from the hospital.  Phones are answered 24 hours a day 7 days a week 365 days a year.    Providers - Choose \"CONTINUE HOME MED (no script)\" at discharge if patient treatment with home infusion will continue.     fentaNYL (DURAGESIC) 25 mcg/hr 72 " hr patch Place 1 patch onto the skin every 48 hours Fill 11/27/23 and start 11/29/23. 30 day supply for chronic pain. 11/12/2023    Lidocaine (LIDOCARE) 4 % Patch Place 1 patch onto the skin daily as needed for moderate pain To prevent lidocaine toxicity, patient should be patch free for 12 hrs daily. Past Month    lipids plant base (CLINOLIPID) 20 % infusion Inject 250 mLs into the vein every 24 hours (Patient taking differently: Inject 250 mLs into the vein three times a week MWF)     naloxone (NARCAN) 4 MG/0.1ML nasal spray Spray 1 spray (4 mg) into one nostril alternating nostrils as needed for opioid reversal every 2-3 minutes until assistance arrives More than a month    nystatin (MYCOSTATIN) 043229 UNIT/GM external cream Apply topically 2 times daily (Patient taking differently: Apply topically 2 times daily Apply to g-tube site) 11/13/2023    ondansetron (ZOFRAN ODT) 8 MG ODT tab DISSOLVE ONE TABLET ON TONGUE EVERY 8 HOURS AS NEEDED FOR NAUSEA 11/13/2023    pantoprazole (PROTONIX) 40 MG EC tablet TAKE 1 TABLET (40 MG) BY MOUTH DAILY 11/13/2023    polyethylene glycol (MIRALAX) 17 GM/Dose powder Take 17 g by mouth daily (Patient taking differently: Take 17 g by mouth daily as needed) Past Month    sucralfate (CARAFATE) 1 GM/10ML suspension TAKE 10MLS  BY MOUTH FOUR TIMES A DAY AS NEEDED 11/13/2023    vitamin D2 (ERGOCALCIFEROL) 64446 units (1250 mcg) capsule Take 1 capsule (50,000 Units) by mouth once a week (Patient taking differently: Take 50,000 Units by mouth once a week Every Monday) 11/13/2023

## 2023-11-14 NOTE — PROGRESS NOTES
Pt report attempted to be given to assigned unit, [facility] 8MS, receiving nurse unable to take report at this time.

## 2023-11-14 NOTE — PROCEDURES
[facility]    Procedure: IR Procedure Note    Date/Time: 11/14/2023 2:58 PM    Performed by: Koko Enriquez PA-C  Authorized by: Koko Enriquez PA-C      UNIVERSAL PROTOCOL   Site Marked: NA  Prior Images Obtained and Reviewed:  Yes  Required items: Required blood products, implants, devices and special equipment available    Patient identity confirmed:  Verbally with patient, arm band, provided demographic data and hospital-assigned identification number  Patient was reevaluated immediately before administering moderate or deep sedation or anesthesia  Confirmation Checklist:  Patient's identity using two indicators, relevant allergies, procedure was appropriate and matched the consent or emergent situation and correct equipment/implants were available  Time out: Immediately prior to the procedure a time out was called    Universal Protocol: the Joint Commission Universal Protocol was followed    Preparation: Patient was prepped and draped in usual sterile fashion    ESBL (mL):  0.1     ANESTHESIA    Anesthesia: Local infiltration  Local Anesthetic:  Lidocaine 1% without epinephrine  Anesthetic Total (mL):  2      SEDATION    Patient Sedated: No    See dictated procedure note for full details.  Findings: Existing left internal jugular 6 Fr., dual lumen tunneled CVC removed intact and without difficulty. Catheter tip sent for culture.    : Catheter tip sent for culture.    Complications: None    Condition: Stable    Plan: Follow up per primary team. Upright for 2 hours, no strenuous activity for 3 days.      PROCEDURE    Patient Tolerance:  Patient tolerated the procedure well with no immediate complications  Length of time physician/provider present for 1:1 monitoring during sedation: 0

## 2023-11-14 NOTE — PROGRESS NOTES
CLINICAL NUTRITION SERVICES - ASSESSMENT NOTE     Nutrition Prescription    RECOMMENDATIONS FOR MDs/PROVIDERS TO ORDER:  - May have electrolyte shifts requiring some electrolyte supplementation.      Malnutrition Status:    Severe malnutrition in the context of acute illness    Recommendations already ordered by Registered Dietitian (RD):  - Start PPN tonight per provider consult:   Dosing weight:  86.8 kg--FHI dose wt  Access: Peripheral!    Initial parameters (per day)  Volume:  2000 mL over 24 hr  Dextrose: 100 g  AA: 85 g  Lipids: 250 mL 20%, 7 days per week     Dextrose titration: No titration    Additives: 10 ml infuvite, 1 ml tralement and 5 mg Zn as at home    Goal PN provides 100 g dextrose, 85 g AA, and 250 mL 20% lipids 7 days per week for total provision of 1180 Kcals (14 kcal/kg), 1 g/kg protein, GIR <1  mg/kg/minute, and 42% fat kcals on average daily.      - Collaborate with other providers--attending (notified of likely severe dehydration and requested bolus before starting TPN and mentioned GI Sx) and PharmD.  - Request admission weight.   - Enter order to kitchen for Gatorade prn per pt request.     Future/Additional Recommendations:  - Follow for weight confirmation and update estimated needs. Follow electrolytes and hydration status. Suspect will need more IVF volume via a secondary line.      Unable to speak with pt prior to ordering PPN.  As it was unclear how much of current wt was related to dehydration vs caloric deficient, continued to use most recent FHI dose wt.      REASON FOR ASSESSMENT  Parker Acevedo is a/an 53 year old male assessed by the dietitian for Pharmacy/Nutrition to Start and Manage PN--Cm removed, ID okay with PPN during line holiday.    Chart reviewed.  Per chart PMH includes Celestino-En-Y gastric bypass in 2002 c/b need for reoperation and short gut syndrome, severe malnutrition on chronic TPN with Cm, dysphagia, anxiety, recurrent CLABSI, and LUE non-purulent  "cellulitis and catheter-associated septic subclavian DVT (on Lovenox)  presenting with klebsiella bacteremia line infection being admitted for IV antibiotics as well as a line holiday. Per 10/19 post op clinic visit, surgeon noted exploratory lap on 9/30 for small bowel intussusception with small bowel resection.      Pt relates that since his surgery he can only tolerate sips of gatorade and has a lot of abdominal pain \"like a side ache when you eat a lot and go swimming.\"  He reports no stool output except about once every 3 weeks (clarified this mean output of any kind).  He notes the rare output is liquid and low volume.  He notes increased nausea, severe heart burn and increased gas and bloating especially when he runs his TPN with lipids.  He often has to stop it for 30 minutes, walk around.  He opens up his J tube and releases air and fluid.  Then he feels a little better and can resume the remainder of his TPN/lipid run.  He notes he last had IVF of any kind about 3 days ago.  He ran out of TPN and daily bag of LR because [facility] refused to send more until he addressed his fever.      NUTRITION HISTORY  Latest [facility] TPN--cyclic over 12 hr: 1250 ml TPN SatSunTuesThurs w/ 100 g AA, 175 g dextrose.  On MWF, receives 1000 ml TPN with 250 ml intralipid. This provides average of 1209 kcal (14 kcal/kg using [facility]'s dose wt of 86.8 kg), 1.2 g Pro/kg), with 18% of kcal from fat. TPN contained 10 ml infuvite and 1 ml tralement plus 5 mg extra zinc.     Pt also takes 1 L bolus of LR q day for total daily volume of ~2L.     Per 10/19 clinic note, pt was \"tolerating a few bites of food.\"  Suspect this is less than the patients norm based on his usual TPN and lipid plan.     Noted per ED/OP records that pt was reluctant to go to ED for fever.  [facility] pharmacist notified pt they would not send more IVF/TPN until he was evaluated.      CURRENT NUTRITION ORDERS  Diet: Regular  Intake/Tolerance: no intake.     LABS  Labs reviewed   " "Latest Reference Range & Units 11/14/23 04:24 11/14/23 10:30   Sodium 135 - 145 mmol/L 138    Potassium 3.4 - 5.3 mmol/L 3.8    Chloride 98 - 107 mmol/L 105    Carbon Dioxide (CO2) 22 - 29 mmol/L 24    Urea Nitrogen 6.0 - 20.0 mg/dL 15.0    Creatinine 0.67 - 1.17 mg/dL 0.81    GFR Estimate >60 mL/min/1.73m2 >90    Calcium 8.6 - 10.0 mg/dL 8.5 (L)    Anion Gap 7 - 15 mmol/L 9    Albumin 3.5 - 5.2 g/dL 3.7    Protein Total 6.4 - 8.3 g/dL 6.8    Alkaline Phosphatase 40 - 129 U/L 61    ALT 0 - 70 U/L 7    AST 0 - 45 U/L 11    Bilirubin Total <=1.2 mg/dL 0.2    Glucose 70 - 99 mg/dL 95    Lactic Acid 0.7 - 2.0 mmol/L  0.8   (L): Data is abnormally low    MEDICATIONS  Medications reviewed    ANTHROPOMETRICS  Height: 5'11\"  Most Recent Weight: 82.1 kg (181 lb) 11/11     IBW: 78.2 kg (Pt @ 105% but with some extra wt coming from residual loose skin from significant post RNY wt loss.  BMI: Overweight BMI 25-29.9  Weight History:   Wt Readings from Last 20 Encounters:   11/11/23 82.1 kg (181 lb) ED--pt believes this is his wt on home scale within the past week   10/19/23 89.7 kg (197 lb 11.2 oz) clinic   09/29/23 88.5 kg (195 lb)   09/19/23 89.8 kg (198 lb)   09/18/23 90.3 kg (199 lb)   09/05/23 89.8 kg (198 lb)   08/06/23 85 kg (187 lb 6.3 oz)   08/04/23 86.6 kg (191 lb)   07/11/23 86.8 kg (191 lb 6.4 oz)   06/07/23 87.7 kg (193 lb 6.4 oz)   06/04/23 87.1 kg (192 lb)   06/02/23 79 kg (174 lb 1.6 oz)   06/01/23 85.9 kg (189 lb 6.4 oz)   05/28/23 84.9 kg (187 lb 1.6 oz)   01/21/23 82.6 kg (182 lb)   01/13/23 85.3 kg (188 lb)   09/27/22 80.1 kg (176 lb 8 oz)   08/10/22 76.7 kg (169 lb 3.2 oz)   08/09/22 76.7 kg (169 lb)   07/16/22 77.1 kg (170 lb)     Weight assessment: Wt loss of 8.5% within past month--significant.    Dosing Weight: 82.1 kg (most recent wt available)    ASSESSED NUTRITION NEEDS  Estimated Energy Needs: 1642 - 2053 kcals/day (20 - 25 kcals/kg)  Justification: Maintenance with 100% of needs required from TPN " and minimal expected orally.  Estimated Protein Needs: 82 - 99 grams protein/day (1 - 1.2 grams of pro/kg)  Justification: Repletion of LBM with wt loss  Estimated Fluid Needs: 2460+ mL/day (30 ml/kg + replacement of GI losses from vented J tube)   Justification: Increased needs and Per provider pending fluid status    PHYSICAL FINDINGS  See malnutrition section below.  Poor dentition--edentulous    MALNUTRITION  % Intake: </=75% for >/= 1 month (severe)  % Weight Loss: > 5% in 1 month (severe)  Subcutaneous Fat Loss: Facial region: mild vs moderate (w/ current dehydration) and Upper arm:  mild  Muscle Loss: Scapular bone:  moderate, Thoracic region (clavicle, acromium bone, deltoid, trapezius):  moderate, Upper arm (bicep, tricep):  mild, and Dorsal hand:  mild  Fluid Accumulation/Edema: None noted  Malnutrition Diagnosis: Severe malnutrition in the context of acute illness    NUTRITION DIAGNOSIS  Inadequate parenteral nutrition infusion related to increased needs since surgery 9/30 with decreased tolerance of oral intake as evidenced by significant wt loss over past month, tolerance of only sips of gatorade, mild to moderate fat and muscle loss.      INTERVENTIONS  Implementation  Nutrition Education: Explained plan for short term PPN and likely need for increased provisions in future TPN based on current po intake.   Collaboration with other providers  Modify composition of meals/snacks--gatorade  Parenteral Nutrition/IV Fluids - Initiate (PPN)    Goals  Total avg nutritional intake to meet a minimum of 20-25 kcal/kg and 1 g PRO/kg daily (per dosing wt 82 kg--pending wt update).     Monitoring/Evaluation  Progress toward goals will be monitored and evaluated per protocol.  Chasidy Salas (Becky) RD, LD   6B and 8A Med/surg (M-F) Pager: [phone]  Weekend/holiday pager: [phone]

## 2023-11-15 ENCOUNTER — APPOINTMENT (OUTPATIENT)
Dept: GENERAL RADIOLOGY | Facility: CLINIC | Age: 53
DRG: 314 | End: 2023-11-15
Attending: INTERNAL MEDICINE
Payer: COMMERCIAL

## 2023-11-15 ENCOUNTER — APPOINTMENT (OUTPATIENT)
Dept: CARDIOLOGY | Facility: CLINIC | Age: 53
DRG: 314 | End: 2023-11-15
Attending: INTERNAL MEDICINE
Payer: COMMERCIAL

## 2023-11-15 LAB
ALBUMIN SERPL BCG-MCNC: 3.9 G/DL (ref 3.5–5.2)
ALP SERPL-CCNC: 64 U/L (ref 40–150)
ALT SERPL W P-5'-P-CCNC: 10 U/L (ref 0–70)
ANION GAP SERPL CALCULATED.3IONS-SCNC: 7 MMOL/L (ref 7–15)
AST SERPL W P-5'-P-CCNC: 14 U/L (ref 0–45)
BASOPHILS # BLD AUTO: 0.1 10E3/UL (ref 0–0.2)
BASOPHILS NFR BLD AUTO: 1 %
BILIRUB DIRECT SERPL-MCNC: <0.2 MG/DL (ref 0–0.3)
BILIRUB SERPL-MCNC: 0.2 MG/DL
BUN SERPL-MCNC: 13 MG/DL (ref 6–20)
CALCIUM SERPL-MCNC: 8.6 MG/DL (ref 8.6–10)
CHLORIDE SERPL-SCNC: 107 MMOL/L (ref 98–107)
CREAT SERPL-MCNC: 0.75 MG/DL (ref 0.67–1.17)
DEPRECATED HCO3 PLAS-SCNC: 26 MMOL/L (ref 22–29)
EGFRCR SERPLBLD CKD-EPI 2021: >90 ML/MIN/1.73M2
EOSINOPHIL # BLD AUTO: 0.2 10E3/UL (ref 0–0.7)
EOSINOPHIL NFR BLD AUTO: 2 %
ERYTHROCYTE [DISTWIDTH] IN BLOOD BY AUTOMATED COUNT: 18 % (ref 10–15)
GLUCOSE BLDC GLUCOMTR-MCNC: 111 MG/DL (ref 70–99)
GLUCOSE BLDC GLUCOMTR-MCNC: 135 MG/DL (ref 70–99)
GLUCOSE BLDC GLUCOMTR-MCNC: 146 MG/DL (ref 70–99)
GLUCOSE BLDC GLUCOMTR-MCNC: 149 MG/DL (ref 70–99)
GLUCOSE SERPL-MCNC: 103 MG/DL (ref 70–99)
HCT VFR BLD AUTO: 31.2 % (ref 40–53)
HGB BLD-MCNC: 9.5 G/DL (ref 13.3–17.7)
IMM GRANULOCYTES # BLD: 0 10E3/UL
IMM GRANULOCYTES NFR BLD: 1 %
INR PPP: 1.09 (ref 0.85–1.15)
LVEF ECHO: NORMAL
LYMPHOCYTES # BLD AUTO: 2.6 10E3/UL (ref 0.8–5.3)
LYMPHOCYTES NFR BLD AUTO: 31 %
MAGNESIUM SERPL-MCNC: 2.1 MG/DL (ref 1.7–2.3)
MCH RBC QN AUTO: 25.7 PG (ref 26.5–33)
MCHC RBC AUTO-ENTMCNC: 30.4 G/DL (ref 31.5–36.5)
MCV RBC AUTO: 85 FL (ref 78–100)
MONOCYTES # BLD AUTO: 0.9 10E3/UL (ref 0–1.3)
MONOCYTES NFR BLD AUTO: 11 %
NEUTROPHILS # BLD AUTO: 4.7 10E3/UL (ref 1.6–8.3)
NEUTROPHILS NFR BLD AUTO: 54 %
NRBC # BLD AUTO: 0 10E3/UL
NRBC BLD AUTO-RTO: 0 /100
PHOSPHATE SERPL-MCNC: 3.3 MG/DL (ref 2.5–4.5)
PLATELET # BLD AUTO: 271 10E3/UL (ref 150–450)
POTASSIUM SERPL-SCNC: 3.8 MMOL/L (ref 3.4–5.3)
PREALB SERPL-MCNC: 16.8 MG/DL (ref 20–40)
PROT SERPL-MCNC: 7.2 G/DL (ref 6.4–8.3)
RBC # BLD AUTO: 3.69 10E6/UL (ref 4.4–5.9)
SODIUM SERPL-SCNC: 140 MMOL/L (ref 135–145)
WBC # BLD AUTO: 8.6 10E3/UL (ref 4–11)

## 2023-11-15 PROCEDURE — 82248 BILIRUBIN DIRECT: CPT | Performed by: PHARMACIST

## 2023-11-15 PROCEDURE — 120N000002 HC R&B MED SURG/OB UMMC

## 2023-11-15 PROCEDURE — 250N000009 HC RX 250: Performed by: INTERNAL MEDICINE

## 2023-11-15 PROCEDURE — 36415 COLL VENOUS BLD VENIPUNCTURE: CPT | Performed by: PHARMACIST

## 2023-11-15 PROCEDURE — 85610 PROTHROMBIN TIME: CPT | Performed by: PHARMACIST

## 2023-11-15 PROCEDURE — 80053 COMPREHEN METABOLIC PANEL: CPT | Performed by: PHARMACIST

## 2023-11-15 PROCEDURE — 84134 ASSAY OF PREALBUMIN: CPT | Performed by: PHARMACIST

## 2023-11-15 PROCEDURE — 85025 COMPLETE CBC W/AUTO DIFF WBC: CPT | Performed by: INTERNAL MEDICINE

## 2023-11-15 PROCEDURE — 84100 ASSAY OF PHOSPHORUS: CPT | Performed by: PHARMACIST

## 2023-11-15 PROCEDURE — 250N000013 HC RX MED GY IP 250 OP 250 PS 637: Performed by: INTERNAL MEDICINE

## 2023-11-15 PROCEDURE — 93306 TTE W/DOPPLER COMPLETE: CPT

## 2023-11-15 PROCEDURE — 99233 SBSQ HOSP IP/OBS HIGH 50: CPT | Performed by: INTERNAL MEDICINE

## 2023-11-15 PROCEDURE — 74018 RADEX ABDOMEN 1 VIEW: CPT

## 2023-11-15 PROCEDURE — 83735 ASSAY OF MAGNESIUM: CPT | Performed by: PHARMACIST

## 2023-11-15 PROCEDURE — 250N000013 HC RX MED GY IP 250 OP 250 PS 637

## 2023-11-15 PROCEDURE — 74018 RADEX ABDOMEN 1 VIEW: CPT | Mod: 26 | Performed by: STUDENT IN AN ORGANIZED HEALTH CARE EDUCATION/TRAINING PROGRAM

## 2023-11-15 PROCEDURE — 250N000013 HC RX MED GY IP 250 OP 250 PS 637: Performed by: PHARMACIST

## 2023-11-15 PROCEDURE — 250N000011 HC RX IP 250 OP 636: Mod: JZ

## 2023-11-15 PROCEDURE — 93306 TTE W/DOPPLER COMPLETE: CPT | Mod: 26 | Performed by: INTERNAL MEDICINE

## 2023-11-15 PROCEDURE — 99233 SBSQ HOSP IP/OBS HIGH 50: CPT | Performed by: STUDENT IN AN ORGANIZED HEALTH CARE EDUCATION/TRAINING PROGRAM

## 2023-11-15 PROCEDURE — 258N000003 HC RX IP 258 OP 636

## 2023-11-15 RX ORDER — SODIUM CHLORIDE 9 MG/ML
INJECTION, SOLUTION INTRAVENOUS
Status: COMPLETED
Start: 2023-11-15 | End: 2023-11-15

## 2023-11-15 RX ORDER — SENNOSIDES 8.6 MG
8.6 TABLET ORAL DAILY
Status: DISCONTINUED | OUTPATIENT
Start: 2023-11-15 | End: 2023-11-17 | Stop reason: HOSPADM

## 2023-11-15 RX ORDER — LIDOCAINE 4 G/G
3 PATCH TOPICAL DAILY PRN
Status: DISCONTINUED | OUTPATIENT
Start: 2023-11-15 | End: 2023-11-17 | Stop reason: HOSPADM

## 2023-11-15 RX ORDER — BISACODYL 10 MG
10 SUPPOSITORY, RECTAL RECTAL DAILY PRN
Status: DISCONTINUED | OUTPATIENT
Start: 2023-11-15 | End: 2023-11-17 | Stop reason: HOSPADM

## 2023-11-15 RX ADMIN — ASCORBIC ACID, VITAMIN A PALMITATE, CHOLECALCIFEROL, THIAMINE HYDROCHLORIDE, RIBOFLAVIN-5 PHOSPHATE SODIUM, PYRIDOXINE HYDROCHLORIDE, NIACINAMIDE, DEXPANTHENOL, ALPHA-TOCOPHEROL ACETATE, VITAMIN K1, FOLIC ACID, BIOTIN, CYANOCOBALAMIN: 200; 3300; 200; 6; 3.6; 6; 40; 15; 10; 150; 600; 60; 5 INJECTION, SOLUTION INTRAVENOUS at 21:53

## 2023-11-15 RX ADMIN — OXYCODONE HYDROCHLORIDE 10 MG: 5 SOLUTION ORAL at 02:44

## 2023-11-15 RX ADMIN — ALPRAZOLAM 0.5 MG: 0.5 TABLET ORAL at 21:56

## 2023-11-15 RX ADMIN — ALPRAZOLAM 0.5 MG: 0.5 TABLET ORAL at 08:21

## 2023-11-15 RX ADMIN — ACETAMINOPHEN 650 MG: 160 SUSPENSION ORAL at 21:51

## 2023-11-15 RX ADMIN — OXYCODONE HYDROCHLORIDE 10 MG: 5 SOLUTION ORAL at 21:49

## 2023-11-15 RX ADMIN — ASCORBIC ACID, VITAMIN A PALMITATE, CHOLECALCIFEROL, THIAMINE HYDROCHLORIDE, RIBOFLAVIN-5 PHOSPHATE SODIUM, PYRIDOXINE HYDROCHLORIDE, NIACINAMIDE, DEXPANTHENOL, ALPHA-TOCOPHEROL ACETATE, VITAMIN K1, FOLIC ACID, BIOTIN, CYANOCOBALAMIN: 200; 3300; 200; 6; 3.6; 6; 40; 15; 10; 150; 600; 60; 5 INJECTION, SOLUTION INTRAVENOUS at 23:39

## 2023-11-15 RX ADMIN — SODIUM CHLORIDE 1000 ML: 9 INJECTION, SOLUTION INTRAVENOUS at 06:15

## 2023-11-15 RX ADMIN — NYSTATIN: 100000 CREAM TOPICAL at 08:11

## 2023-11-15 RX ADMIN — OXYCODONE HYDROCHLORIDE 10 MG: 5 SOLUTION ORAL at 08:09

## 2023-11-15 RX ADMIN — PANTOPRAZOLE SODIUM 40 MG: 40 TABLET, DELAYED RELEASE ORAL at 08:09

## 2023-11-15 RX ADMIN — NYSTATIN: 100000 CREAM TOPICAL at 03:42

## 2023-11-15 RX ADMIN — OXYCODONE HYDROCHLORIDE 10 MG: 5 SOLUTION ORAL at 13:48

## 2023-11-15 RX ADMIN — CEFTRIAXONE SODIUM 2 G: 2 INJECTION, POWDER, FOR SOLUTION INTRAMUSCULAR; INTRAVENOUS at 03:41

## 2023-11-15 RX ADMIN — DEXTROAMPHETAMINE SACCHARATE, AMPHETAMINE ASPARTATE, DEXTROAMPHETAMINE SULFATE, AMPHETAMINE SULFATE TABLETS, 10 MG,CLL 20 MG: 2.5; 2.5; 2.5; 2.5 TABLET ORAL at 08:09

## 2023-11-15 RX ADMIN — ENOXAPARIN SODIUM 80 MG: 80 INJECTION SUBCUTANEOUS at 08:11

## 2023-11-15 RX ADMIN — ENOXAPARIN SODIUM 80 MG: 80 INJECTION SUBCUTANEOUS at 19:58

## 2023-11-15 RX ADMIN — BISACODYL 10 MG: 10 SUPPOSITORY RECTAL at 15:11

## 2023-11-15 RX ADMIN — NYSTATIN: 100000 CREAM TOPICAL at 20:00

## 2023-11-15 ASSESSMENT — ACTIVITIES OF DAILY LIVING (ADL)
ADLS_ACUITY_SCORE: 37

## 2023-11-15 NOTE — PROGRESS NOTES
CLINICAL NUTRITION SERVICES - BRIEF NOTE     Nutrition Prescription    RECOMMENDATIONS FOR MDs/PROVIDERS TO ORDER:  - none at present.     Recommendations already ordered by Registered Dietitian (RD):  - Collaborate with other providers--attending (requested 2 PIVs as anticipate need for electrolyte bumps and/or possible fluid boluses) and bedside RN.  - Continue current PPN (continuous) and discontinue lipid orders as pt is currently refusing to resume them until he returns home.     Future/Additional Recommendations:  - follow plan for replacement of central line and resume of TPN and lipids.  Based on updated wt.  If resolution of constipation doesn't improve pt's tolerance of oral intake, may need increase in his usual TPN and lipids.  Will await input from \[facility]\"" RD Friday re: this as may be difficult without increasing lipid days that he already dislikes.  - Follow GI function and resolution of constipation.  Anticipate this may increase GI losses from loose stools (which are to be expected w/ SBS) and possibly increase total fluid needs.      Pt out to smoke this AM when attempted to meet with him to discuss need for lipids.   Spoke with provider about pt's description of GI sx of going 3 weeks with passing stool and frequent pain requiring opening J tube to release gas and fluid with no oral intake beyond sips of Gatorade.  Plan abd xray.    Met with pt after he returned from outside and clarified what he was wearing, had on him with standing weight this AM. Asked about whether he had a cell phone w/ him in case we needed to reach him while he was outside    EVALUATION OF THE PROGRESS TOWARD GOALS   Diet: regular and order to kitchen to allow pt to have Gatorade prn  Nutrition Support: 2L bag of PPN (5% Dex, 4.25% AA) plus 250 ml lipids q day-- provides 100 g dextrose, 85 g protein, and 250 mL 20% lipids 7 days per week for total provision of 1180 Kcals (14 kcal/kg), 1 g/kg protein, GIR <1  mg/kg/minute, and  42% fat kcals on average daily.     Intake: Per RN this AM, pt refused the lipids last night noting that they make him sick and cause diarrhea.  Without lipids PPN only providing 680 kcal and 85 g Protein.        NEW FINDINGS   GI--Per RN, pt declined bowel Rx including miralax (doesn't sound like he could tolerate this much volume at once).     Per abdominal xray today--large colonic stool burden.  Unclear if pt will resume tolerating more po fluids and soft foods as he was prior to his 9/30 surgery. Discussed w/ provider and she will work on resolving this issue.     Labs--delayed as pt not available earlier.  BMP, Mg and PO4 all wnl with BG < 150 overnight.     Hgb 9.5 with Ferritin of 11 on 11/2/23.  Presume any iron infusion would be held until line infection clears.   Last Vitamin D check 24 8/7/23.  No recent labs for other fat soluble vitamins in [facility] system.      Weight update:  11/15/23 0838 86.6 kg (191 lb) Standing scale *   11/11/23 82.1 kg (181 lb) ED--likely stated per pt report based on wt on home scale.     Wt assessment update:     *pt was wearing clothes, sweatshirt/coat, shoes with keys, cigarettes, lighter and wallet in his pockets.  Estimate this added ~4-5 lb or 2 kg so likely weighs ~ 84.6 kg.     Dosing Weight: 84.6 kg (11/15 estimated wt w/o extra clothes, shoes, items in pockets based on wt of 86.6kg)     ASSESSED NUTRITION NEEDS  Estimated Energy Needs: 1692 - 2115 kcals/day (20 - 25 kcals/kg)  Justification: Maintenance with 100% of needs required from TPN and minimal expected orally.  Estimated Protein Needs: 85 - 102 grams protein/day (1 - 1.2 grams of pro/kg)  Justification: Repletion of LBM with wt loss  Estimated Fluid Needs: 2538+ mL/day (30 ml/kg + replacement of GI losses from vented J tube or resume of more regular stooling)   Justification: Increased needs and Per provider pending fluid status     INTERVENTIONS  Education--will check with pt on whether he will allow lipids  at least MWF as at home and discuss whether we can contact him via phone when he needs to come back to the floor for cares, blood draws, etc.   Collaboration with other providers  Parenteral Nutrition/IV Fluids - continue PPN, lipids as pt will allow     Monitoring/Evaluation  Progress toward goals will be monitored and evaluated per protocol.     Chasidy Grimm) Maritza RD, LD   6B and 8A Med/surg (M-F) Pager: [phone]  Weekend/holiday pager: [phone]

## 2023-11-15 NOTE — PROGRESS NOTES
Shift [id number]:Pt has a history of Celestino-En-Y gastric bypass in 2022. Pt has Short Gut Syndrome, dysphagia and severe malnutrion     Summary:Pt had his Subclavian removed yesterday Continues to have Chronic abdomen pain that he takes Oxycodone Prn  VS:       Pt A/O X 4. Afebrile. VSS. Lungs- Clear bilaterally  Denies, shortness of breath, and chest pain.Pt does have nausea and is taking Zofran     Output:       Bowels- + in all four quadrants.Pt has a history of having stool about once every three weeks Voids spontaneously without   difficulty in the toilet.      Activity:       Pt up and independent .     Skin:   No skin issues .     Pain:       Has pain in the abdomen and has Oxycodone for pain .      CMS:       CMS and Neuro's are intact. Denies numbness and tingling in all extremities.      Dressing:     Left internal jugular tunneled CVC removed.      Diet:       Pt is on a Clear Liquid diet Pt drinks Gatorade.       LDA:       PIV is patent located in the Right Hand.      Equipment:       Refused PCD's on BLE's. Bilateral heels are elevated off the bed.  Pt is up walking in his room Pt has a Lovenox order   Plan:       Pt is able to make needs known and the call light is within the pt's reach. Continue to monitor.       Additional Info:      .

## 2023-11-15 NOTE — PROGRESS NOTES
[facility]    Medicine Progress Note - Hospitalist Service, GOLD TEAM 21    Date of Admission:  11/14/2023    Assessment & Plan      Parker Acevedo is a 53 year old male admitted on 11/14/2023. He has a PMH of Celestino-En-Y gastric bypass in 2002 c/b need for reoperation and short gut syndrome, severe malnutrition on chronic TPN with Cm, dysphagia, anxiety, recurrent CLABSI, and LUE non-purulent cellulitis and catheter-associated septic subclavian DVT (on Lovenox)  presenting with klebsiella bacteremia line infection being admitted for IV antibiotics as well as a line holiday.     Klebsiella Bacteremia  Central Line infection  Hx recurrent CLABSI  Chronic TPN  Indwelling Cm line  - Continue ceftriaxone (11/14 - ###), can adjust pending Bcx sensitivities  - IR removed line on 11/14, will need to put in consult when cleared by ID for Cm replacement   - Per IR, lovenox does not need to be held prior to line placement  - Hold carvedilol iso bacteremia, likely can restart in coming days    Short gut syndrome  TPN dependent  Severe malnutrition  J tube  Reflux  - Pharmacy/nutrition consulted for parenteral nutrition during admission (currently on PPN while Cm removed)  - Continued home regimen (carafate, miralax, senna, zofran)     Constipation  Intermittent abdominal discomfort  AXR on 11/15/2023 with large colonic stool burden. Patient describes abdominal discomfort with PO intake, has to vent J tube. He has very infrequent bowel movements (can go a couple of weeks between bowel movements) and they are often liquid.   - Discussed with patient that he is likely constipated and recommended enema vs suppository  - Patient will only do one of these if he can self-administer, will order both - RN to work with patient to ensure one is given  - Continue bowel regimen with miralax and senna    Chronic pain  - PTA Oxycodone 10mg q4h prn, fentanyl patch q3d,  "lidocaine patch, tylenol prn  - Pt prescribed lyrica but never took it after his last surgery      Catheter-associated septic subclavian DVT  - Continued Lovenox 80mg BID     Anemia  Baseline over past couple years appears to be around 10-11, likely in setting of chronic disease. Hgb on admission at 9.6     Tobacco use disorder  - Patient declined NRT     Anxiety: PTA lorazepam at bedtime  ADHD: PTA Adderall  Paroxysmal AFib: PTA carvedilol        Diet: Regular Diet Adult  parenteral nutrition - Clinimix E  Snacks/Supplements Adult: Other; Please send Gatorade when requested by pt or RN; Between Meals  parenteral nutrition - Clinimix E    DVT Prophylaxis: Enoxaparin (Lovenox) SQ  Sanchez Catheter: Not present  Lines: PRESENT             Cardiac Monitoring: None  Code Status: Full Code      Clinically Significant Risk Factors                         # Overweight: Estimated body mass index is 26.65 kg/m  as calculated from the following:    Height as of this encounter: 1.803 m (5' 10.98\").    Weight as of this encounter: 86.6 kg (191 lb)., PRESENT ON ADMISSION  # Severe Malnutrition: based on nutrition assessment, PRESENT ON ADMISSION          Disposition Plan      Expected Discharge Date: 11/17/2023        Discharge Comments: Pending ID recs, replacement of central line access, will possibly discharge on IV abx            Aubrie Mera MD  Hospitalist Service, GOLD TEAM 21  [facility]  Securely message with Listen Edition (more info)  Text page via [facility] Paging/Directory   See signed in provider for up to date coverage information  ______________________________________________________________________    Interval History   Patient reports feeling well today. He has been on and off the unit multiple times throughout the day. He declined lipids with PPN last night. No further fevers since 11/14 AM. He is hoping to get discharged before the weekend.     Physical Exam   Vital " Signs: Temp: 98.6  F (37  C) Temp src: Oral BP: 100/87 Pulse: 80   Resp: 16 SpO2: 100 % O2 Device: None (Room air)    Weight: 191 lbs 0 oz  Physical Exam  Constitutional:       General: He is not in acute distress.     Appearance: Normal appearance. He is not toxic-appearing.   HENT:      Head: Normocephalic and atraumatic.      Right Ear: External ear normal.      Left Ear: External ear normal.      Nose: Nose normal.      Mouth/Throat:      Mouth: Mucous membranes are moist.   Eyes:      Extraocular Movements: Extraocular movements intact.      Conjunctiva/sclera: Conjunctivae normal.   Cardiovascular:      Rate and Rhythm: Normal rate and regular rhythm.      Heart sounds: Normal heart sounds. No murmur heard.  Pulmonary:      Effort: Pulmonary effort is normal. No respiratory distress.      Breath sounds: Normal breath sounds.   Abdominal:      General: Bowel sounds are normal. There is no distension.      Palpations: Abdomen is soft.      Tenderness: There is no abdominal tenderness.   Musculoskeletal:         General: Normal range of motion.      Cervical back: Normal range of motion.   Skin:     Findings: No rash.   Neurological:      Mental Status: He is alert. Mental status is at baseline.       Medical Decision Making       60 MINUTES SPENT BY ME on the date of service doing chart review, history, exam, documentation & further activities per the note.      Data     I have personally reviewed the following data over the past 24 hrs:    8.6  \   9.5 (L)   / 271     140 107 13.0 /  111 (H)   3.8 26 0.75 \     ALT: 10 AST: 14 AP: 64 TBILI: 0.2   ALB: 3.9 TOT PROTEIN: 7.2 LIPASE: N/A     INR:  1.09 PTT:  N/A   D-dimer:  N/A Fibrinogen:  N/A       Imaging results reviewed over the past 24 hrs:   Recent Results (from the past 24 hour(s))   XR Abdomen 1 View    Narrative    Examination: XR ABDOMEN 1 VIEW 11/15/2023 11:53 AM     Comparison: CT A/P 9/29/2023. Abdominal radiograph 2/24/2022.    History: Abdominal  pain and abdominal distention with oral intake.  Concern for obstruction.    Findings: Supine AP abdominal radiographs. No definite obstructive  bowel gas pattern. Large colonic stool burden. No evidence of  pneumatosis or portal venous gas. The lung bases are unremarkable.  Anastomotic sutures project over the left upper and mid abdomen.  Gastrostomy projecting over the left upper abdomen. Cholecystectomy  clips. Surgical clips in the left upper and mid abdomen.      Impression    Impression: Large colonic stool burden.    I have personally reviewed the examination and initial interpretation  and I agree with the findings.    MANUEL CALDWELL MD         SYSTEM ID:  [id number]   Echo Complete   Result Value    LVEF  55-60%    Narrative    [id number]  [id number]  [id number]  [id number]^JENNIFER^YESIKA     [facility]  Echocardiography Laboratory  [address] [place] [id number]     Name: SARA HASSAN  MRN: [id number]  : 1970  Study Date: 11/15/2023 01:42 PM  Age: 53 yrs  Gender: Male  Patient Location: [facility] [place]  Reason For Study: Other, Please Specify in Comments  Ordering Physician: YESIKA RODRIGUEZ  Performed By: Isi Middleton     BSA: 2.0 m2  Height: 70 in  Weight: 191 lb  ______________________________________________________________________________  Procedure  Complete Portable Echo Adult. Technically difficult study. Poor acoustic  windows.  ______________________________________________________________________________  Interpretation Summary  Technically difficult study.  Global and regional left ventricular function is normal with an EF of 55-60%.  Global right ventricular function is normal.  This study was compared with the study from 23: No significant changes  noted.  ______________________________________________________________________________  Left Ventricle  Global and regional left ventricular function is normal with an EF of 55-60%.  Left  ventricular size is normal. Left ventricular wall thickness cannot  evaluate. Left ventricular diastolic function is not assessable.     Right Ventricle  The right ventricle is normal size. Global right ventricular function is  normal.     Mitral Valve  The mitral valve is normal. Trace mitral insufficiency is present.     Aortic Valve  The valve leaflets are not well visualized. On Doppler interrogation, there is  no significant stenosis or regurgitation.     Tricuspid Valve  The tricuspid valve is normal. Trace tricuspid insufficiency is present. The  peak velocity of the tricuspid regurgitant jet is not obtainable. Pulmonary  artery systolic pressure cannot be assessed.     Pulmonic Valve  The pulmonic valve cannot be assessed.     Vessels  The aorta root cannot be assessed. Dilation of the inferior vena cava is  present with normal respiratory variation in diameter. IVC diameter and  respiratory changes fall into an intermediate range suggesting an RA pressure  of 8 mmHg.     Pericardium  No pericardial effusion is present.     Compared to Previous Study  This study was compared with the study from 7/6/23 . No significant changes  noted.  ______________________________________________________________________________  Report approved by: Sudhir Salomon 11/15/2023 03:43 PM     ______________________________________________________________________________

## 2023-11-15 NOTE — PROGRESS NOTES
Brief Medicine Note:    Notified by RN that patient refusing his lipids this evening. On further discussion with patient, he typically only does the same volume/content of lipids at home every other day due to gut ache and diarrhea and prefers to continue his home regimen while in the hospital.     Discussed with patient - okay to hold on lipids this evening due to hx of GI distress. Will defer to dayteam/RD for future plans for lipids.    Marnie Mir PA-C  Internal Medicine [facility] Hospitalist  [facility]

## 2023-11-15 NOTE — PROGRESS NOTES
"Brief Care Management Note:    During rounds this AM, provider stated the earliest pt will be discharge-ready is Friday (11/17), however she believes it likely that pt will be in the hospital through the weekend.  Provider stated it is possible that pt will require IV abx at discharge.    Reached out to [facility] liaison regarding this pt.  Liaison confirmed pt is open to their services for TPN, hydration, and central line care, will need SHAN order at discharge.  Informed liaison of RUPAL and that a referral for potential IV abx will be sent.    Sent a referral to [facility] for an insurance benefit check in the event patient will need IV abx at discharge.  Received the following response regarding coverage:  \"Patient s BCBS Medicare covers for IV ABX as long as drug is administered via CADD pump. It looks like patient is currently on Ceftriaxone, which can be administered via CADD pump so there is coverage at this time. Patient has met their out of pocket of $2900 so coverage will be 100% for the remainder of the year.\"        GERRI Berry  [facility]  Units 8M/S & 10 ICU  Pager: [phone]  Phone: [phone]    "

## 2023-11-15 NOTE — PROGRESS NOTES
[facility] Infectious Disease Consult Service - Red Team   Patient: Parker Acevedo, Date of birth 1970, Medical record number [id number].      Recommendations:     Continue ceftriaxone 2 g daily for line associated bacteremia, trend CBC, BMP  Await transthoracic echo report.  No need for further blood cultures unless new blood culture positivity    ID Will continue to follow, please page us with questions or if situation arises where we may be of assistance.      Signed:  Uday Mitchell MD, 11/15/2023 Staff Physician, General Infectious Disease  Page Me @ [phone] or Secure Message me via [employer]     After hours for coverage see [facility] ->INFECTIOUS DISEASE MEDICINE ADULT/[facility] -> 1ST CALL [facility] GENERAL ID        Patient Summary:     Patient on TPN secondary to short gut syndrome with numerous recurrent line associated bacteremia's presents due to fever as well using a line and gram-negative bacteremia detected recently in the emergency room.         ID Problem List and Discussion:     CLABSI  Gram-negative bacteremia  Recurrent bacteremia  TPN dependent    Patient has had numerous episodes of line infections.  Some mono microbial some polymicrobial as well as yeast.  Most recent infection was in August with identical organism which was treated with antibiotic lock therapy.  Patient reports that diligence with line cares but potentially some contamination may have occurred during the time of his intussusception circa September 30.    The differential is fourfold:  1.  Failure of prior antibiotic lock therapy and ongoing infection since August  2.  Recurrent introduction of new Klebsiella on catheter  3.  Recurrence of bacteremia due to seeding on the site such as heart valves leading to recurrent endogenous infection  4.  Recurrent bacteremia due to unexplained Klebsiella infection either in urinary or gastrointestinal source.    Due to apparent failure of prior  "antibiotic lock therapy line was removed 11/14.    We can start with a transthoracic echo.  We will treat him with ceftriaxone.       Selected Labs, Micro, Imaging Results:     Blood cultures from 2023:  11/15-pending.  11/14-gram-negative bacilli  11/11-relatively susceptible Klebsiella pneumoniae  8/6 no growth  8/4 Klebsiella pneumoniae  7/5 no growth  7/4 Staph hominis and Staph epidermidis  6/5 no growth  6/4 Klebsiella oxytoca, Enterococcus faecalis, Klebsiella pneumoniae      Interval/subjective     Parker Acevedo is a 53 year old patient who presented on 11/14/2023 for fevers, positive blood cultures.    Line was removed yesterday.  Overnight no fevers or chills.  No new symptoms. Went for a few walks today, feels \"stir crazy\" In the hospital, but understanding of need for further evaluation.    Review of Symptoms:  A comprehensive 14 system review of symptoms was conducted and was otherwise negative (unless mentioned above)       Physical Exam:     Vital signs:  Temp: 98.6  F (37  C) Temp src: Oral BP: 100/87 Pulse: 80   Resp: 16 SpO2: 100 % O2 Device: None (Room air)   Height: 180.3 cm (5' 10.98\") Weight: 86.6 kg (191 lb)  Estimated body mass index is 26.65 kg/m  as calculated from the following:    Height as of this encounter: 1.803 m (5' 10.98\").    Weight as of this encounter: 86.6 kg (191 lb).      GENERAL APPEARANCE: Not in acute distress    PHYSICAL EXAM:  Eyes:     Extraocular eye movements grossly intact, no ptosis, no discharge, no scleral icterus.  Mouth, Throat:     Mucous membranes moist, pharynx normal without lesions.  Cardiovascular:    Inspection: No cyanosis, JVD not elevated.   Auscultation:  S1, S2 normal, regular rate and rhythm.  Respiratory:     Inspection: Not in respiratory distress, Chest expansion symmetrical.   Auscultation: Bibasilar crackles, no wheeze  Gastrointestinal:  Numerous scars ostomy  Musculoskeletal:     No elbow, wrist, knee or ankle tenderness, deformity or " swelling. No quadriceps, calf or upper arm muscular tenderness noted.  Neurologic:     Higher Mental Function: Conversant, AOx4   Motor: Moving all 4 limbs in bed   Sensory: Crude touch intact in all 4 limbs  Psychiatric: Appropriate  Access: Left chest wall line site [now removed].  Without signs of spreading redness or cellulitis- non tender  Skin:   Anicteric       Other Data:     MEDICATIONS   amphetamine-dextroamphetamine  20 mg Oral Daily    [Held by provider] carvedilol  12.5 mg Oral BID w/meals    cefTRIAXone  2 g Intravenous Q24H    enoxaparin ANTICOAGULANT  80 mg Subcutaneous BID    fentaNYL  25 mcg Transdermal Q72H    And    fentaNYL   Transdermal Q8H CECILE    lipids plant base  250 mL Intravenous Q24H    nystatin   Topical BID    pantoprazole  40 mg Oral Daily    polyethylene glycol  17 g Oral Daily    sennosides  8.6 mg Oral Daily       GENERAL LABS  Urine Studies     Recent Labs   Lab Test 11/11/23  0042 08/06/23  1056 06/05/23  0820 01/21/23  0916 07/16/22  2200   URINEPH 6.0 7.0 5.0 6.5 8.0*   NITRITE Negative Negative Negative Negative Negative   LEUKEST Negative Negative Negative Negative Negative   WBCU 1 1 4 0 1         MICROBIOLOGY    Last Culture results   Rapid Strep A Screen   Date Value Ref Range Status   07/29/2015   Final    NEGATIVE: No Group A streptococcal antigen detected by immunoassay, await   culture report.       Culture   Date Value Ref Range Status   11/14/2023 Culture in progress  Preliminary   11/14/2023 No growth after 12 hours  Preliminary   11/14/2023 Positive on the 1st day of incubation (A)  Preliminary   11/14/2023 Klebsiella pneumoniae (AA)  Preliminary     Comment:     2 of 2 bottles   11/14/2023 Positive on the 1st day of incubation (A)  Preliminary   11/14/2023 Klebsiella pneumoniae (AA)  Preliminary     Comment:     2 of 2 bottles   11/11/2023 No growth after 4 days  Preliminary   11/11/2023 Positive on the 1st day of incubation (A)  Final   11/11/2023 Klebsiella  pneumoniae (AA)  Final     Comment:     2 of 2 bottles   08/07/2023 No Growth  Final   08/07/2023 No Growth  Final   08/06/2023 No Growth  Final   08/06/2023 No Growth  Final   08/04/2023 Positive on the 1st day of incubation (A)  Final   08/04/2023 Klebsiella pneumoniae (AA)  Final     Comment:     2 of 2 bottles   08/04/2023 No Growth  Final   07/07/2023 No Growth  Final   07/06/2023 No Growth  Final   07/06/2023 No Growth  Final   07/06/2023 No Growth  Final     Culture Micro   Date Value Ref Range Status   05/28/2021 No growth  Final   05/28/2021 No growth  Final   05/28/2021 No growth  Final   04/02/2021 No growth  Final   04/02/2021 No growth  Final   04/02/2021 No growth  Final   03/22/2021 No growth  Final   03/22/2021 No growth  Final   03/21/2021 No growth  Final   03/21/2021 No growth  Final     Escherichia coli   Date Value Ref Range Status   11/11/2023 Not Detected Not Detected Final         Virology:  Coronavirus-19 testing    Recent Labs   Lab Test 11/11/23  0130 08/04/23  0427 08/09/22  1806 07/16/22  2219 07/12/21  1023 05/05/21  0944   [id number] Negative Negative Negative Negative   < > Test received-See reflex to IDDL test SARS CoV2 (COVID-19) Virus RT-PCR  NEGATIVE   [id number]  --   --   --   --   --  Nasopharyngeal   [id number]  --   --   --   --   --  Nasopharyngeal    < > = values in this interval not displayed.     Respiratory virus (non-coronavirus-19) testing    Recent Labs   Lab Test 11/11/23  0130 07/07/22  1319 01/08/22  1858   IFLUA  --   --  Not Detected   INFZA Negative   < >  --    FLUAH1  --   --  Not Detected   [id number]  --   --  Not Detected   FLUAH3  --   --  Not Detected   IFLUB  --   --  Not Detected   INFZB Negative   < >  --    PIV1  --   --  Not Detected   PIV2  --   --  Not Detected   PIV3  --   --  Not Detected   PIV4  --   --  Not Detected   IRSV Negative   < >  --    RSVA  --   --  Not Detected   RSVB  --   --  Not Detected   HMPV  --   --  Not Detected   RHINEV  --    --  Not Detected   ADENOV  --   --  Not Detected   CORONA  --   --  Not Detected    < > = values in this interval not displayed.         IMAGING  Recent Results (from the past 48 hour(s))   XR Chest 2 Views    Narrative    EXAM: XR CHEST 2 VIEWS  LOCATION: [facility]  DATE: 11/14/2023    INDICATION: ? pneumonia  COMPARISON: 11/11/2023.    FINDINGS: Left subclavian infusion catheter. No pneumothorax. The heart size is normal. The lungs are clear. There is no pleural effusion.      Impression    IMPRESSION: No acute abnormality.   XR Abdomen 1 View    Narrative    Examination: XR ABDOMEN 1 VIEW 11/15/2023 11:53 AM     Comparison: CT A/P 9/29/2023. Abdominal radiograph 2/24/2022.    History: Abdominal pain and abdominal distention with oral intake.  Concern for obstruction.    Findings: Supine AP abdominal radiographs. No definite obstructive  bowel gas pattern. Large colonic stool burden. No evidence of  pneumatosis or portal venous gas. The lung bases are unremarkable.  Anastomotic sutures project over the left upper and mid abdomen.  Gastrostomy projecting over the left upper abdomen. Cholecystectomy  clips. Surgical clips in the left upper and mid abdomen.      Impression    Impression: Large colonic stool burden.    I have personally reviewed the examination and initial interpretation  and I agree with the findings.    MANUEL CALDWELL MD         SYSTEM ID:  [id number]       ATTESTATION  I have reviewed labs/blood-work that are part of this patients present encounter in addition to historical and baseline values. The specific values are recorded in Epic and I have incorporated them and my interpretation as relevant into my assessment and plan as recorded.  I have reviewed radiology reports/EKGs/and other diagnostic studies that are part of this patients present encounter, in addition to historical and baseline results.  The specific reports are available in Epic and I  have incorporated them into my assessment and plan as described above. Independently interpreted diagnostic study results are described above.  This dictation was prepared in part using Dragon recognition software.  As a result errors may occur. When identified these transcription errors have been corrected.  While every attempt is made to correct errors during dictation, errors may still exist.      Medical Decision Making  I saw and evaluated this patient on todays date as part of an E&M Encounter     1- Number and Complexity of Problems Addressed as part the Encounter  I addressed 1 acute or chronic illness or injury that poses a threat to life or bodily function bacteremia.  Gram-negative sepsis  2- Amount and/or Complexity of Data to Be Reviewed and Analyzed  I reviewed lab and imaging tests (CBC and others) & notes from primary team and nursing staff   3- Risk of Complications and/or Morbidity or Mortality of Patient Management:  High due to treatment with IV antibiotics that did require at least weekly lab monitoring for toxicity.           Signature:     Uday Mitchell MD

## 2023-11-16 ENCOUNTER — ANESTHESIA EVENT (OUTPATIENT)
Dept: SURGERY | Facility: CLINIC | Age: 53
DRG: 314 | End: 2023-11-16
Payer: COMMERCIAL

## 2023-11-16 LAB
ANION GAP SERPL CALCULATED.3IONS-SCNC: 8 MMOL/L (ref 7–15)
BACTERIA BLD CULT: ABNORMAL
BACTERIA BLD CULT: NO GROWTH
BACTERIA SPEC CULT: ABNORMAL
BASOPHILS # BLD AUTO: 0 10E3/UL (ref 0–0.2)
BASOPHILS NFR BLD AUTO: 1 %
BUN SERPL-MCNC: 11.3 MG/DL (ref 6–20)
CALCIUM SERPL-MCNC: 8.5 MG/DL (ref 8.6–10)
CHLORIDE SERPL-SCNC: 107 MMOL/L (ref 98–107)
CREAT SERPL-MCNC: 0.72 MG/DL (ref 0.67–1.17)
DEPRECATED HCO3 PLAS-SCNC: 25 MMOL/L (ref 22–29)
EGFRCR SERPLBLD CKD-EPI 2021: >90 ML/MIN/1.73M2
EOSINOPHIL # BLD AUTO: 0.2 10E3/UL (ref 0–0.7)
EOSINOPHIL NFR BLD AUTO: 3 %
ERYTHROCYTE [DISTWIDTH] IN BLOOD BY AUTOMATED COUNT: 18.2 % (ref 10–15)
GLUCOSE BLDC GLUCOMTR-MCNC: 107 MG/DL (ref 70–99)
GLUCOSE BLDC GLUCOMTR-MCNC: 113 MG/DL (ref 70–99)
GLUCOSE BLDC GLUCOMTR-MCNC: 151 MG/DL (ref 70–99)
GLUCOSE SERPL-MCNC: 95 MG/DL (ref 70–99)
HCT VFR BLD AUTO: 31.3 % (ref 40–53)
HGB BLD-MCNC: 9.3 G/DL (ref 13.3–17.7)
IMM GRANULOCYTES # BLD: 0 10E3/UL
IMM GRANULOCYTES NFR BLD: 1 %
LYMPHOCYTES # BLD AUTO: 2.4 10E3/UL (ref 0.8–5.3)
LYMPHOCYTES NFR BLD AUTO: 35 %
MAGNESIUM SERPL-MCNC: 2.1 MG/DL (ref 1.7–2.3)
MCH RBC QN AUTO: 25.9 PG (ref 26.5–33)
MCHC RBC AUTO-ENTMCNC: 29.7 G/DL (ref 31.5–36.5)
MCV RBC AUTO: 87 FL (ref 78–100)
MONOCYTES # BLD AUTO: 0.9 10E3/UL (ref 0–1.3)
MONOCYTES NFR BLD AUTO: 14 %
NEUTROPHILS # BLD AUTO: 3.1 10E3/UL (ref 1.6–8.3)
NEUTROPHILS NFR BLD AUTO: 46 %
NRBC # BLD AUTO: 0 10E3/UL
NRBC BLD AUTO-RTO: 0 /100
PHOSPHATE SERPL-MCNC: 4 MG/DL (ref 2.5–4.5)
PLATELET # BLD AUTO: 267 10E3/UL (ref 150–450)
POTASSIUM SERPL-SCNC: 4 MMOL/L (ref 3.4–5.3)
RBC # BLD AUTO: 3.59 10E6/UL (ref 4.4–5.9)
SODIUM SERPL-SCNC: 140 MMOL/L (ref 135–145)
WBC # BLD AUTO: 6.6 10E3/UL (ref 4–11)

## 2023-11-16 PROCEDURE — 87040 BLOOD CULTURE FOR BACTERIA: CPT | Performed by: INTERNAL MEDICINE

## 2023-11-16 PROCEDURE — 250N000013 HC RX MED GY IP 250 OP 250 PS 637: Performed by: INTERNAL MEDICINE

## 2023-11-16 PROCEDURE — 80048 BASIC METABOLIC PNL TOTAL CA: CPT | Performed by: PHARMACIST

## 2023-11-16 PROCEDURE — 99233 SBSQ HOSP IP/OBS HIGH 50: CPT | Performed by: STUDENT IN AN ORGANIZED HEALTH CARE EDUCATION/TRAINING PROGRAM

## 2023-11-16 PROCEDURE — 250N000013 HC RX MED GY IP 250 OP 250 PS 637

## 2023-11-16 PROCEDURE — 36415 COLL VENOUS BLD VENIPUNCTURE: CPT | Performed by: INTERNAL MEDICINE

## 2023-11-16 PROCEDURE — 99418 PROLNG IP/OBS E/M EA 15 MIN: CPT | Performed by: INTERNAL MEDICINE

## 2023-11-16 PROCEDURE — 99233 SBSQ HOSP IP/OBS HIGH 50: CPT | Performed by: INTERNAL MEDICINE

## 2023-11-16 PROCEDURE — 120N000002 HC R&B MED SURG/OB UMMC

## 2023-11-16 PROCEDURE — 85025 COMPLETE CBC W/AUTO DIFF WBC: CPT | Performed by: INTERNAL MEDICINE

## 2023-11-16 PROCEDURE — 84100 ASSAY OF PHOSPHORUS: CPT | Performed by: PHARMACIST

## 2023-11-16 PROCEDURE — 250N000011 HC RX IP 250 OP 636: Mod: JZ

## 2023-11-16 PROCEDURE — 250N000009 HC RX 250: Performed by: INTERNAL MEDICINE

## 2023-11-16 PROCEDURE — 36415 COLL VENOUS BLD VENIPUNCTURE: CPT | Performed by: PHARMACIST

## 2023-11-16 PROCEDURE — 250N000013 HC RX MED GY IP 250 OP 250 PS 637: Performed by: PHARMACIST

## 2023-11-16 PROCEDURE — 83735 ASSAY OF MAGNESIUM: CPT | Performed by: PHARMACIST

## 2023-11-16 RX ORDER — LIDOCAINE 40 MG/G
CREAM TOPICAL
Status: DISCONTINUED | OUTPATIENT
Start: 2023-11-16 | End: 2023-11-17 | Stop reason: HOSPADM

## 2023-11-16 RX ORDER — FENTANYL 25 UG/1
25 PATCH TRANSDERMAL
Status: DISCONTINUED | OUTPATIENT
Start: 2023-11-16 | End: 2023-11-17 | Stop reason: HOSPADM

## 2023-11-16 RX ADMIN — CEFTRIAXONE SODIUM 2 G: 2 INJECTION, POWDER, FOR SOLUTION INTRAMUSCULAR; INTRAVENOUS at 03:55

## 2023-11-16 RX ADMIN — FENTANYL 1 PATCH: 25 PATCH TRANSDERMAL at 16:50

## 2023-11-16 RX ADMIN — ASCORBIC ACID, VITAMIN A PALMITATE, CHOLECALCIFEROL, THIAMINE HYDROCHLORIDE, RIBOFLAVIN-5 PHOSPHATE SODIUM, PYRIDOXINE HYDROCHLORIDE, NIACINAMIDE, DEXPANTHENOL, ALPHA-TOCOPHEROL ACETATE, VITAMIN K1, FOLIC ACID, BIOTIN, CYANOCOBALAMIN: 200; 3300; 200; 6; 3.6; 6; 40; 15; 10; 150; 600; 60; 5 INJECTION, SOLUTION INTRAVENOUS at 21:13

## 2023-11-16 RX ADMIN — NYSTATIN: 100000 CREAM TOPICAL at 07:57

## 2023-11-16 RX ADMIN — ALPRAZOLAM 0.5 MG: 0.5 TABLET ORAL at 22:32

## 2023-11-16 RX ADMIN — PANTOPRAZOLE SODIUM 40 MG: 40 TABLET, DELAYED RELEASE ORAL at 07:53

## 2023-11-16 RX ADMIN — OXYCODONE HYDROCHLORIDE 10 MG: 5 SOLUTION ORAL at 15:23

## 2023-11-16 RX ADMIN — POLYETHYLENE GLYCOL 3350 17 G: 17 POWDER, FOR SOLUTION ORAL at 07:53

## 2023-11-16 RX ADMIN — ACETAMINOPHEN 650 MG: 160 SUSPENSION ORAL at 07:54

## 2023-11-16 RX ADMIN — OXYCODONE HYDROCHLORIDE 10 MG: 5 SOLUTION ORAL at 22:32

## 2023-11-16 RX ADMIN — OXYCODONE HYDROCHLORIDE 10 MG: 5 SOLUTION ORAL at 06:07

## 2023-11-16 RX ADMIN — DEXTROAMPHETAMINE SACCHARATE, AMPHETAMINE ASPARTATE, DEXTROAMPHETAMINE SULFATE, AMPHETAMINE SULFATE TABLETS, 10 MG,CLL 20 MG: 2.5; 2.5; 2.5; 2.5 TABLET ORAL at 07:51

## 2023-11-16 RX ADMIN — ALPRAZOLAM 0.5 MG: 0.5 TABLET ORAL at 07:51

## 2023-11-16 RX ADMIN — NYSTATIN: 100000 CREAM TOPICAL at 20:00

## 2023-11-16 RX ADMIN — OXYCODONE HYDROCHLORIDE 10 MG: 5 SOLUTION ORAL at 10:06

## 2023-11-16 RX ADMIN — ENOXAPARIN SODIUM 80 MG: 80 INJECTION SUBCUTANEOUS at 20:01

## 2023-11-16 RX ADMIN — ENOXAPARIN SODIUM 80 MG: 80 INJECTION SUBCUTANEOUS at 10:16

## 2023-11-16 ASSESSMENT — ACTIVITIES OF DAILY LIVING (ADL)
ADLS_ACUITY_SCORE: 37
DEPENDENT_IADLS:: TRANSPORTATION
ADLS_ACUITY_SCORE: 37

## 2023-11-16 NOTE — PROGRESS NOTES
[facility]    Medicine Progress Note - Hospitalist Service, GOLD TEAM 21    Date of Admission:  11/14/2023    Assessment & Plan   Parker Acevedo is a 53 year old male admitted on 11/14/2023. He has a PMH of Celestino-En-Y gastric bypass in 2002 c/b need for reoperation and short gut syndrome, severe malnutrition on chronic TPN with Cm, dysphagia, anxiety, recurrent CLABSI, and LUE non-purulent cellulitis and catheter-associated septic subclavian DVT (on Lovenox)  presenting with klebsiella bacteremia line infection being admitted for IV antibiotics as well as a line holiday.     Klebsiella Bacteremia  Central Line infection  Hx recurrent CLABSI  Chronic TPN  Indwelling Cm line s/p removal on 11/14/2023  - ID following, appreciate recs  - Continue ceftriaxone (11/14 - ###), tentative EOT 11/28/2023 if RONEL negative  - IR removed line on 11/14, per ID, can get new tunneled line once most recent blood culture is negative x 72 hrs (which would be 11/17 at 12:17 pm); IR consulted on 11/16 AM for possible line placement on 11/17 afternoon, however, their policy requires 2 negative bcx for at least 48 hrs, drawn at least 24 hrs apart before new tunneled line can be placed; therefore, earliest IR can place line is Monday 11/20  - IR recommended PICC placement with placement of tunneled line in the outpatient setting, so PICC consult placed  - Per IR, lovenox does not need to be held prior to line placement  - TTE performed on 11/15 - no evidence of endocarditis  - RONEL ordered today per ID recs, will be performed on 11/17  - Hold carvedilol iso bacteremia, likely can restart in coming days    Short gut syndrome  TPN dependent  Severe malnutrition  J tube  Reflux  - Pharmacy/nutrition consulted for parenteral nutrition during admission (currently on PPN while Cm removed)  - Continued home regimen (carafate, miralax, senna, zofran)     Constipation  Intermittent  "abdominal discomfort  AXR on 11/15/2023 with large colonic stool burden. Patient describes abdominal discomfort with PO intake, has to vent J tube. He has very infrequent bowel movements (can go a couple of weeks between bowel movements) and they are often liquid.   - Discussed with patient that he is likely constipated and recommended enema vs suppository  - Per RN, patient to attempt suppository today  - Continue bowel regimen with miralax and senna    Chronic pain  - PTA Oxycodone 10mg q4h prn, fentanyl patch q3d, lidocaine patch, tylenol prn  - Pt prescribed lyrica but never took it after his last surgery      Catheter-associated septic subclavian DVT  - Continue Lovenox 80mg BID     Anemia  Baseline over past couple years appears to be around 10-11, likely in setting of chronic disease. Hgb on admission at 9.6     Tobacco use disorder  - Patient declined NRT     Anxiety: PTA lorazepam at bedtime  ADHD: PTA Adderall  Paroxysmal AFib: PTA carvedilol          Diet: Regular Diet Adult  Snacks/Supplements Adult: Other; Please send Gatorade when requested by pt or RN; Between Meals  parenteral nutrition - Clinimix E    DVT Prophylaxis: Enoxaparin (Lovenox) SQ  Sanchez Catheter: Not present  Lines: PRESENT             Cardiac Monitoring: None  Code Status: Full Code      Clinically Significant Risk Factors                         # Overweight: Estimated body mass index is 26.65 kg/m  as calculated from the following:    Height as of this encounter: 1.803 m (5' 10.98\").    Weight as of this encounter: 86.6 kg (191 lb)., PRESENT ON ADMISSION  # Severe Malnutrition: based on nutrition assessment, PRESENT ON ADMISSION          Disposition Plan      Expected Discharge Date: 11/17/2023        Discharge Comments: Pending ID recs, replacement of central line access, will possibly discharge on IV abx            Aubrie Mera MD  Hospitalist Service, GOLD TEAM 21  [facility]  Securely message with [employer] (more info)  Text page via [facility] Paging/Directory   See signed in provider for up to date coverage information  ______________________________________________________________________    Interval History   Patient reports that he is feeling well. He is really hoping to leave the hospital tomorrow and is adamant that he will not stay the weekend, even if that means leaving AMA. He was frustrated to find out that he cannot get tunneled line placed tomorrow. No fevers.     Wife updated over the phone.     Physical Exam   Vital Signs: Temp: 98  F (36.7  C) Temp src: Oral BP: 118/81 Pulse: 85   Resp: 16 SpO2: 99 %      Weight: 191 lbs 0 oz  Physical Exam  Constitutional:       General: He is not in acute distress.     Appearance: Normal appearance. He is not toxic-appearing.   HENT:      Head: Normocephalic and atraumatic.      Right Ear: External ear normal.      Left Ear: External ear normal.      Nose: Nose normal.      Mouth/Throat:      Mouth: Mucous membranes are moist.   Eyes:      Extraocular Movements: Extraocular movements intact.      Conjunctiva/sclera: Conjunctivae normal.   Pulmonary:      Effort: Pulmonary effort is normal. No respiratory distress.   Musculoskeletal:         General: Normal range of motion.      Cervical back: Normal range of motion.   Skin:     Findings: No rash.   Neurological:      Mental Status: He is alert. Mental status is at baseline.       Medical Decision Making       75 MINUTES SPENT BY ME on the date of service doing chart review, history, exam, documentation & further activities per the note.      Data     I have personally reviewed the following data over the past 24 hrs:    6.6  \   9.3 (L)   / 267     140 107 11.3 /  113 (H)   4.0 25 0.72 \       Imaging results reviewed over the past 24 hrs:   No results found for this or any previous visit (from the past 24 hour(s)).

## 2023-11-16 NOTE — PROGRESS NOTES
[facility] Infectious Disease Consult Service - Red Team   Patient: Parker Acevedo, Date of birth 1970, Medical record number [id number].      Recommendations:     Will need RONEL to define duration therapy due to duration of symptoms, recurrent bacteremia and poor visualization of AV on TTE 11/15/23  Ok to place PICC line or tunneled line provided culture from 11/14 @ 12:17 remains negative at 48h and patient remains afebrile  With gram negative bacteremia, negative culture after  antibiotic initiation and clinical resolution of fevers I do not think the patient is currently bacteremic- Given line is the only factor that requires the patient to remain inpatient then would advocate for line placement if able 11/17/23 to avoid multiple procedures as a vein preservation strategy.  Continue Ceftriaxone 2g daily, provided RONEL negative then end date will be 11/28/23 (14 days from line removal)  See OPAT documentation below    ID Will continue to follow, please page us with questions or if situation arises where we may be of assistance.      Signed:  Uday Mitchell MD, 11/16/2023 Staff Physician, General Infectious Disease  Page Me @ [phone] or Secure Message me via [employer]     After hours for coverage see [facility] [place]INFECTIOUS DISEASE MEDICINE ADULT/[facility] -> [place] [facility] [place] [place]        OP[facility]   Primary team:  Place order panel  OPAT Pharmacy ([facility]) Review and ID Care Transition     Prolonged Parenteral/Oral Antibiotic Recommendations and ID Follow up  This template provides final ID recommendations as of this date.     Infectious Diseases Indication:     Antibiotic Information  Name of Antibiotic Dose of Antibiotic1 Anticipated duration Effective start date2 End date   Ceftriaxone 2g  14 days ( if ECHO abnormal will need to extend )  11/15/23 11/28/23                 1.Dose of antibiotic will need to be renally adjusted if creatinine clearance changes  2.Effective start  "date is the date of adequate therapy with appropriate spectrum    Method of antibiotic delivery:Tunneled line.Is the line being used for another indication besides antimicrobials? Yes At the end of therapy should the line used for antimicrobials be removed or de-accessed? No. Selecting \"yes\" will function as written order to remove PICC line or de-access the indwelling line at the end of therapy.    Weekly labs required: CBC with diff and CMP. Dr. Mitchell will follow labs    Are there pending microbial tests: Yes Cultures     Imaging for ID follow up: ID Imaging: No.     ID Follow up-  No ID follow up needed provided trans-esophageal echo does not show signs of endocarditis    ID provider route note: OPAT RN Care Coordinator [facility] ID Clinic Information:  Phone: [phone]  Fax: [phone] (Attention Carlos Sorensen)         Patient Summary:     Patient on TPN secondary to short gut syndrome with numerous recurrent line associated bacteremia's presents due to fever as well using a line and gram-negative bacteremia detected recently in the emergency room.         ID Problem List and Discussion:     CLABSI  Gram-negative bacteremia  Recurrent bacteremia  TPN dependent    Patient has had numerous episodes of line infections.  Some mono microbial some polymicrobial as well as yeast.  Most recent infection was in August with identical organism which was treated with antibiotic lock therapy.  Patient reports that diligence with line cares but potentially some contamination may have occurred during the time of his intussusception circa September 30.    The differential is fourfold:  1.  Failure of prior antibiotic lock therapy and ongoing infection since August  2.  Recurrent introduction of new Klebsiella on catheter  3.  Recurrence of bacteremia due to seeding on the site such as heart valves leading to recurrent endogenous infection  4.  Recurrent bacteremia due to " "unexplained Klebsiella infection either in urinary or gastrointestinal source.    Due to apparent failure of prior antibiotic lock therapy line was removed 11/14.    We will treat him with ceftriaxone, plan will be 14 days unless RONEL shows signs of endocarditis       Selected Labs, Micro, Imaging Results:     Cultures from 2023:  11/15-Line Culture relatively susceptible Klebsiella pneumoniae  11/14 after initiation of antibiotics- NGTD  11/14-relatively susceptible Klebsiella pneumoniae  11/11-relatively susceptible Klebsiella pneumoniae  8/6 no growth  8/4 Klebsiella pneumoniae  7/5 no growth  7/4 Staph hominis and Staph epidermidis  6/5 no growth  6/4 Klebsiella oxytoca, Enterococcus faecalis, Klebsiella pneumoniae      Interval/subjective     Parker Acevedo is a 53 year old patient who presented on 11/14/2023 for fevers, positive blood cultures.    He is doing well.  No more fevers.  No new localizing symptoms of infection.  His anniversary is coming up and he is desperate to leave the hospital as soon as possible.  His line site is not bothering him.  I had extended discussion about the need for echo, or advocacy for him to get a line as soon as possible.  He is understanding that the plan will be 2 weeks for a line infection or in the unlikely but still possible event that his echocardiogram does show signs of a more invasive infection would need 6 weeks.    Specifically today: No fevers, no chills, no new joint pain, no back pain, no skin rashes, no cough, no shortness of breath, no change to chronic abdominal pain, no lethargy or weakness.    Review of Symptoms:  A comprehensive 14 system review of symptoms was conducted and was otherwise negative (unless mentioned above)       Physical Exam:     Vital signs:  Temp: 98  F (36.7  C) Temp src: Oral BP: 118/81 Pulse: 85   Resp: 16 SpO2: 99 %     Height: 180.3 cm (5' 10.98\") Weight: 86.6 kg (191 lb)  Estimated body mass index is 26.65 kg/m  as calculated from " "the following:    Height as of this encounter: 1.803 m (5' 10.98\").    Weight as of this encounter: 86.6 kg (191 lb).      GENERAL APPEARANCE: Not in acute distress    PHYSICAL EXAM:  Eyes:     Extraocular eye movements grossly intact, no ptosis, no discharge, no scleral icterus.  Mouth, Throat:     Mucous membranes moist, pharynx normal without lesions.  Cardiovascular:    Inspection: No cyanosis, JVD not elevated.  Respiratory:     Inspection: Not in respiratory distress, Chest expansion symmetrical.  Gastrointestinal:  Numerous scars ostomy  Musculoskeletal:     No elbow, wrist, knee or ankle tenderness, deformity or swelling. No quadriceps, calf or upper arm muscular tenderness noted.  Neurologic:     Higher Mental Function: Conversant, AOx4   Motor: Moving all 4 limbs in bed   Sensory: Crude touch intact in all 4 limbs  Psychiatric: Appropriate  Access: Left chest wall line site [now removed].  Without signs of spreading redness or cellulitis- non tender  Skin:   Anicteric       Other Data:     MEDICATIONS   amphetamine-dextroamphetamine  20 mg Oral Daily    [Held by provider] carvedilol  12.5 mg Oral BID w/meals    cefTRIAXone  2 g Intravenous Q24H    enoxaparin ANTICOAGULANT  80 mg Subcutaneous BID    fentaNYL  25 mcg Transdermal Q72H    And    fentaNYL   Transdermal Q8H CECILE    nystatin   Topical BID    pantoprazole  40 mg Oral Daily    polyethylene glycol  17 g Oral Daily    sennosides  8.6 mg Oral Daily       GENERAL LABS  Urine Studies     Recent Labs   Lab Test 11/11/23  0042 08/06/23  1056 06/05/23  0820 01/21/23  0916 07/16/22  2200   URINEPH 6.0 7.0 5.0 6.5 8.0*   NITRITE Negative Negative Negative Negative Negative   LEUKEST Negative Negative Negative Negative Negative   WBCU 1 1 4 0 1         MICROBIOLOGY    Last Culture results   Rapid Strep A Screen   Date Value Ref Range Status   07/29/2015   Final    NEGATIVE: No Group A streptococcal antigen detected by immunoassay, await   culture report.   "     Culture   Date Value Ref Range Status   11/14/2023 <15 CFU Klebsiella pneumoniae (A)  Final     Comment:     Susceptibilities done on previous cultures   11/14/2023 No growth after 1 day  Preliminary   11/14/2023 Positive on the 1st day of incubation (A)  Final   11/14/2023 Klebsiella pneumoniae (AA)  Final     Comment:     2 of 2 bottles  Susceptibilities done on previous cultures   11/14/2023 Positive on the 1st day of incubation (A)  Final   11/14/2023 Klebsiella pneumoniae (AA)  Final     Comment:     2 of 2 bottles  Susceptibilities done on previous cultures   11/11/2023 No Growth  Final   11/11/2023 Positive on the 1st day of incubation (A)  Final   11/11/2023 Klebsiella pneumoniae (AA)  Final     Comment:     2 of 2 bottles   08/07/2023 No Growth  Final   08/07/2023 No Growth  Final   08/06/2023 No Growth  Final   08/06/2023 No Growth  Final   08/04/2023 Positive on the 1st day of incubation (A)  Final   08/04/2023 Klebsiella pneumoniae (AA)  Final     Comment:     2 of 2 bottles   08/04/2023 No Growth  Final   07/07/2023 No Growth  Final   07/06/2023 No Growth  Final   07/06/2023 No Growth  Final   07/06/2023 No Growth  Final     Culture Micro   Date Value Ref Range Status   05/28/2021 No growth  Final   05/28/2021 No growth  Final   05/28/2021 No growth  Final   04/02/2021 No growth  Final   04/02/2021 No growth  Final   04/02/2021 No growth  Final   03/22/2021 No growth  Final   03/22/2021 No growth  Final   03/21/2021 No growth  Final   03/21/2021 No growth  Final     Escherichia coli   Date Value Ref Range Status   11/11/2023 Not Detected Not Detected Final         Virology:  Coronavirus-19 testing    Recent Labs   Lab Test 11/11/23  0130 08/04/23  0427 08/09/22  1806 07/16/22  2219 07/12/21  1023 05/05/21  0944   [id number] Negative Negative Negative Negative   < > Test received-See reflex to IDDL test SARS CoV2 (COVID-19) Virus RT-PCR  NEGATIVE   [id number]  --   --   --   --   --  Nasopharyngeal    [id number]  --   --   --   --   --  Nasopharyngeal    < > = values in this interval not displayed.     Respiratory virus (non-coronavirus-19) testing    Recent Labs   Lab Test 11/11/23  0130 07/07/22  1319 01/08/22  1858   IFLUA  --   --  Not Detected   INFZA Negative   < >  --    FLUAH1  --   --  Not Detected   [id number]  --   --  Not Detected   FLUAH3  --   --  Not Detected   IFLUB  --   --  Not Detected   INFZB Negative   < >  --    PIV1  --   --  Not Detected   PIV2  --   --  Not Detected   PIV3  --   --  Not Detected   PIV4  --   --  Not Detected   IRSV Negative   < >  --    RSVA  --   --  Not Detected   RSVB  --   --  Not Detected   HMPV  --   --  Not Detected   RHINEV  --   --  Not Detected   ADENOV  --   --  Not Detected   CORONA  --   --  Not Detected    < > = values in this interval not displayed.         IMAGING  Recent Results (from the past 48 hour(s))   XR Abdomen 1 View    Narrative    Examination: XR ABDOMEN 1 VIEW 11/15/2023 11:53 AM     Comparison: CT A/P 9/29/2023. Abdominal radiograph 2/24/2022.    History: Abdominal pain and abdominal distention with oral intake.  Concern for obstruction.    Findings: Supine AP abdominal radiographs. No definite obstructive  bowel gas pattern. Large colonic stool burden. No evidence of  pneumatosis or portal venous gas. The lung bases are unremarkable.  Anastomotic sutures project over the left upper and mid abdomen.  Gastrostomy projecting over the left upper abdomen. Cholecystectomy  clips. Surgical clips in the left upper and mid abdomen.      Impression    Impression: Large colonic stool burden.    I have personally reviewed the examination and initial interpretation  and I agree with the findings.    MANUEL CALDWELL MD         SYSTEM ID:  [id number]   Echo Complete   Result Value    LVEF  55-60%    Narrative    [id number]  [id number]  [id number]  [id number]^JENNIFER^YESIKA     [facility]  [address] [place] [address]  [place] [address]     Name: SARA HASSAN  MRN: [id number]  : 1970  Study Date: 11/15/2023 01:42 PM  Age: 53 yrs  Gender: Male  Patient Location: [facility]  Reason For Study: Other, Please Specify in Comments  Ordering Physician: YESIKA RODRIGUEZ  Performed By: Isi Middleton     BSA: 2.0 m2  Height: 70 in  Weight: 191 lb  ______________________________________________________________________________  Procedure  Complete Portable Echo Adult. Technically difficult study. Poor acoustic  windows.  ______________________________________________________________________________  Interpretation Summary  Technically difficult study.  Global and regional left ventricular function is normal with an EF of 55-60%.  Global right ventricular function is normal.  This study was compared with the study from 23: No significant changes  noted.  ______________________________________________________________________________  Left Ventricle  Global and regional left ventricular function is normal with an EF of 55-60%.  Left ventricular size is normal. Left ventricular wall thickness cannot  evaluate. Left ventricular diastolic function is not assessable.     Right Ventricle  The right ventricle is normal size. Global right ventricular function is  normal.     Mitral Valve  The mitral valve is normal. Trace mitral insufficiency is present.     Aortic Valve  The valve leaflets are not well visualized. On Doppler interrogation, there is  no significant stenosis or regurgitation.     Tricuspid Valve  The tricuspid valve is normal. Trace tricuspid insufficiency is present. The  peak velocity of the tricuspid regurgitant jet is not obtainable. Pulmonary  artery systolic pressure cannot be assessed.     Pulmonic Valve  The pulmonic valve cannot be assessed.     Vessels  The aorta root cannot be assessed. Dilation of the inferior vena cava is  present with normal respiratory variation in diameter. IVC diameter  and  respiratory changes fall into an intermediate range suggesting an RA pressure  of 8 mmHg.     Pericardium  No pericardial effusion is present.     Compared to Previous Study  This study was compared with the study from 7/6/23 . No significant changes  noted.  ______________________________________________________________________________  Report approved by: Sudhir Salomon 11/15/2023 03:43 PM     ______________________________________________________________________________          ATTESTATION  I have reviewed labs/blood-work that are part of this patients present encounter in addition to historical and baseline values. The specific values are recorded in Epic and I have incorporated them and my interpretation as relevant into my assessment and plan as recorded.  I have reviewed radiology reports/EKGs/and other diagnostic studies that are part of this patients present encounter, in addition to historical and baseline results.  The specific reports are available in Epic and I have incorporated them into my assessment and plan as described above. Independently interpreted diagnostic study results are described above.  This dictation was prepared in part using Dragon recognition software.  As a result errors may occur. When identified these transcription errors have been corrected.  While every attempt is made to correct errors during dictation, errors may still exist.      Medical Decision Making  I saw and evaluated this patient on todays date as part of an E&M Encounter     1- Number and Complexity of Problems Addressed as part the Encounter  I addressed 1 acute or chronic illness or injury that poses a threat to life or bodily function bacteremia.  Gram-negative sepsis  2- Amount and/or Complexity of Data to Be Reviewed and Analyzed  I reviewed lab and imaging tests (CBC and others) & notes from primary team and nursing staff   3- Risk of Complications and/or Morbidity or Mortality of Patient  Management:  High due to treatment with IV antibiotics that does require at least weekly lab monitoring for toxicity.           Signature:     Uday Mitchell MD

## 2023-11-16 NOTE — CONSULTS
"    Interventional Radiology Consult Service Note    IR consulted for \"needs tunneled central line for chronic TPN\".    Patient is a 53 y.o. with h/o short gut syndrome who is on chronic TPN via a TCVC. IR was asked to removed this left line two days ago due to bacteremia. IR is now consulted to place a new 6 Fr, single lumen tunneled central venous catheter. IR requires 2 negative blood cultures for 48 hours with 24 hours between the two different cultures. IR cannot accommodate a line placement tomorrow due to this. IR recommends doing another blood culture this morning with the possibility of placement as early as Monday.     In the interim, to not delay discharge, VAT team can be contacted to place a PICC and an IR TCVC placement can be scheduled as an outpatient procedure.    Recommendations were reviewed with requesting provider, Dr. Aubrie Grimes.    Vitals:   /81 (BP Location: Right arm)   Pulse 85   Temp 98  F (36.7  C) (Oral)   Resp 16   Ht 1.803 m (5' 10.98\")   Wt 86.6 kg (191 lb)   SpO2 99%   BMI 26.65 kg/m      Pertinent Labs:     Lab Results   Component Value Date    WBC 6.6 11/16/2023    WBC 8.6 11/15/2023    WBC 8.4 11/14/2023    WBC 6.9 06/29/2021    WBC 8.1 06/14/2021    WBC 7.5 06/09/2021       Lab Results   Component Value Date    HGB 9.3 11/16/2023    HGB 9.5 11/15/2023    HGB 9.6 11/14/2023    HGB 12.1 06/29/2021    HGB 12.0 06/14/2021    HGB 13.3 06/09/2021       Lab Results   Component Value Date     11/16/2023     11/15/2023     11/14/2023     06/29/2021     06/14/2021     06/09/2021       Lab Results   Component Value Date    INR 1.09 11/15/2023    INR 1.07 05/07/2021    PTT 25 09/29/2023    PTT 26 07/22/2019       Lab Results   Component Value Date    POTASSIUM 4.0 11/16/2023    POTASSIUM 3.8 12/13/2022    POTASSIUM 3.5 06/29/2021        Juana Burgos PA-C  Interventional Radiology   Pager: [phone]    "

## 2023-11-16 NOTE — CONSULTS
Care Management Initial Consult    General Information  Assessment completed with: Patient,    Type of CM/SW Visit: Initial Assessment    Primary Care Provider verified and updated as needed: Yes   Readmission within the last 30 days: no previous admission in last 30 days      Reason for Consult: discharge planning  Advance Care Planning: Advance Care Planning Reviewed: present on chart          Communication Assessment  Patient's communication style: spoken language (English or Bilingual)             Cognitive  Cognitive/Neuro/Behavioral: WDL                      Living Environment:   People in home: spouse, parent(s)     Current living Arrangements: house      Able to return to prior arrangements: yes       Family/Social Support:  Care provided by: self  Provides care for:    Marital Status:   Wife, Children          Description of Support System: Supportive, Involved         Current Resources:   Patient receiving home care services: Yes  Skilled Home Care Services: Skilled Nursing  Community Resources: Infusion Services  Equipment currently used at home:    Supplies currently used at home: Other, Gloves (TPN Supplies)    Employment/Financial:  Employment Status: disabled        Financial Concerns: none           Does the patient's insurance plan have a 3 day qualifying hospital stay waiver?  No    Lifestyle & Psychosocial Needs:  Social Determinants of Health     Food Insecurity: No Food Insecurity (5/20/2022)    Hunger Vital Sign     Worried About Running Out of Food in the Last Year: Never true     Ran Out of Food in the Last Year: Never true   Depression: Not at risk (2/8/2023)    PHQ-2     PHQ-2 Score: 0   Housing Stability: Low Risk  (8/4/2020)    Housing Stability Vital Sign     Unable to Pay for Housing in the Last Year: No     Number of Places Lived in the Last Year: 1     Unstable Housing in the Last Year: No   Tobacco Use: High Risk (11/11/2023)    Patient History     Smoking Tobacco Use: Light  Smoker     Smokeless Tobacco Use: Never     Passive Exposure: Not on file   Financial Resource Strain: Medium Risk (5/20/2022)    Overall Financial Resource Strain (CARDIA)     Difficulty of Paying Living Expenses: Somewhat hard   Alcohol Use: Not At Risk (8/4/2020)    AUDIT-C     Frequency of Alcohol Consumption: Never     Average Number of Drinks: Patient refused     Frequency of Binge Drinking: Never   Transportation Needs: No Transportation Needs (5/20/2022)    PRAPARE - Transportation     Lack of Transportation (Medical): No     Lack of Transportation (Non-Medical): No   Physical Activity: Unknown (8/4/2020)    Exercise Vital Sign     Days of Exercise per Week: 2 days     Minutes of Exercise per Session: Not on file   Interpersonal Safety: Not At Risk (8/4/2020)    Humiliation, Afraid, Rape, and Kick questionnaire     Fear of Current or Ex-Partner: No     Emotionally Abused: No     Physically Abused: No     Sexually Abused: No   Stress: No Stress Concern Present (8/4/2020)    [employer] - Occupational Stress Questionnaire     Feeling of Stress : Only a little   Social Connections: Socially Isolated (8/4/2020)    Social Connection and Isolation Panel [NHANES]     Frequency of Communication with Friends and Family: Once a week     Frequency of Social Gatherings with Friends and Family: Never     Attends Orthodox Services: Never     Active Member of Clubs or Organizations: No     Attends Club or Organization Meetings: Never     Marital Status:        Functional Status:  Prior to admission patient needed assistance:   Dependent ADLs:: Independent  Dependent IADLs:: Transportation       Mental Health Status:  Mental Health Status: No Current Concerns       Chemical Dependency Status:  Chemical Dependency Status: No Current Concerns             Values/Beliefs:  Spiritual, Cultural Beliefs, Orthodox Practices, Values that affect care: no               Additional Information:  Per  "H&P \"Patient is a 53 y.o. with h/o short gut syndrome who is on chronic TPN via a TCVC. IR was asked to removed this left line two days ago due to bacteremia. IR is now consulted to place a new 6 Fr, single lumen tunneled central venous catheter. IR requires 2 negative blood cultures for 48 hours with 24 hours between the two different cultures. IR cannot accommodate a line placement tomorrow due to this. IR recommends doing another blood culture this morning with the possibility of placement as early as Monday.\"    RNCC met with pt at bedside to introduce role and assess for discharge needs r/t provider consult. Pt reports he lives in a home with his spouse and other adult family members. Pt has been on chronic TPN through [facility]. Pt is medically disabled. Pt denied any MH, CD, or financial concerns. PCP and address verified.     Pt reports he would like to leave and not remain inpatient over weekend while waiting to have new CVC placed. Pt is not agreeable to short-term PICC or midline IV for home IV abx and TPN. Pt reports he is willing to go to an infusion center near his home for daily IV abx infusions and would prefer to forgo TPN for \"a couple of days\" until able to have new CVC placed. Writer deferred to provider for recommendations.     See RNCC Conner Medeiros's note from 11/15 with details regarding [facility] benefits for home TPN, IV hydration, and potential IV abx. [facility] following pt.     Pt reports he is very comfortable with his home TPN setup. Pt would like to discharge tomorrow as it is his wedding anniversary. Pt reports family can transport at discharge. Care management will continue to follow.     [facility]  Phone  [phone]  Fax  [phone]      Steph Cope RN   Nurse Coordinator    Covering for: 8 M/S    Social Work and Care Management Department       SEARCHABLE in [facility] - search CARE COORDINATOR       [employer] [place] ([id number]) Saturday & Sunday; ([phone]) [facility] " Recognized Holidays     Units: 5A, 5B & 5C  Pager: [phone]    Units: 6B, 6C & 6D    Pager: [phone]    Units: 7A, 7B, 7C & 7D    Pager: [phone]    Units: 6A & ICU   Pager: [phone]    Units: 5 Ortho, 5MS & WB ED Pager: [phone]    Units: 6MS, 8A & 10 ICU  Pager [phone]      Steph Cope RN

## 2023-11-16 NOTE — PROGRESS NOTES
Shift [id number]:Pt has a history of Celestino-En-Y gastric bypass in 2022. Pt has Short Gut Syndrome, dysphagia and severe malnutrition     Summary:Pt had his Subclavian removed yesterday Continues to have Chronic abdomen pain that he takes Oxycodone Prn  VS:       Pt A/O X 4. Afebrile. VSS. Lungs- Clear bilaterally  Denies, shortness of breath, and chest pain.Pt does have nausea and is taking Zofran     Output:       Bowels- + in all four quadrants.Pt has a history of having stool about once every three weeks Voids spontaneously without   difficulty in the toilet.      Activity:       Pt up and independent .     Skin:   No skin issues .     Pain:       Has pain in the abdomen and has Oxycodone for pain .      CMS:       CMS and Neuro's are intact. Denies numbness and tingling in all extremities.      Dressing:     Left internal jugular tunneled CVC removed.      Diet:       Pt is on a Clear Liquid diet Pt drinks Gatorade.       LDA:       PIV is patent located in the Right Hand.      Equipment:       Refused PCD's on BLE's. Bilateral heels are elevated off the bed.  Pt is up walking in his room Pt has a Lovenox order   Plan:       Pt is able to make needs known and the call light is within the pt's reach. Continue to monitor.       Additional Info:      .Pt sleeping overnite appears more comfortable

## 2023-11-16 NOTE — PROGRESS NOTES
Brief Care Management Note:    Provider updated this writer that pt is likely able to discharge tomorrow, provided IR will agree to place a new line.  At present, IR is declining to do so until further negative blood cx are received, however ID team believes replacement of line is appropriate at this time.  If a line is not placed, discharge would likely not occur tomorrow unless provider team determines an alternate plan.  Provider stated it is likely pt will need IV abx at discharge.  Provider does not anticipate changes to chronic TPN.    Updated [facility] liaison to the above info, bon acknowledged.    I liaison inquired about pt receiving a new central line.  Per ID note, it appears they are recommending a PICC.  Per IR note, it appears they are recommending a PICC as well, as they do not want to delay discharge but do not want to replace tunneled line at this time.  Updated liaison, informed her that this writer expects discharge tomorrow given this development.    Steph TALLEY completed CMA w/ pt (see her note from today).  Of note, pt does not want home infusion for IV abx, would prefer outpatient in [place], [place].  Pt wants to skip TPN for the weekend.  Pt wants to discharge tomorrow.  Steph informed this writer that pt stated he informed the provider of this.    Updated bon Joe acknowledged.  RNCC to follow up w/ liaison after discussion w/ provider regarding pt's preferences.    This RNCC to follow up w/ provider tomorrow AM regarding discharge planning.          GERRI Berry  [facility]  Units 8M/S & 10 ICU  Pager: [phone]  Phone: [phone]

## 2023-11-17 ENCOUNTER — APPOINTMENT (OUTPATIENT)
Dept: INTERVENTIONAL RADIOLOGY/VASCULAR | Facility: CLINIC | Age: 53
DRG: 314 | End: 2023-11-17
Attending: PHYSICIAN ASSISTANT
Payer: COMMERCIAL

## 2023-11-17 ENCOUNTER — ANESTHESIA (OUTPATIENT)
Dept: SURGERY | Facility: CLINIC | Age: 53
DRG: 314 | End: 2023-11-17
Payer: COMMERCIAL

## 2023-11-17 ENCOUNTER — APPOINTMENT (OUTPATIENT)
Dept: CARDIOLOGY | Facility: CLINIC | Age: 53
DRG: 314 | End: 2023-11-17
Attending: INTERNAL MEDICINE
Payer: COMMERCIAL

## 2023-11-17 VITALS
OXYGEN SATURATION: 95 % | DIASTOLIC BLOOD PRESSURE: 84 MMHG | TEMPERATURE: 98.8 F | RESPIRATION RATE: 16 BRPM | HEIGHT: 71 IN | HEART RATE: 71 BPM | BODY MASS INDEX: 26.46 KG/M2 | SYSTOLIC BLOOD PRESSURE: 124 MMHG | WEIGHT: 189 LBS

## 2023-11-17 PROBLEM — R78.81 BACTEREMIA: Status: RESOLVED | Noted: 2018-10-10 | Resolved: 2023-11-17

## 2023-11-17 LAB
ANION GAP SERPL CALCULATED.3IONS-SCNC: 8 MMOL/L (ref 7–15)
BASOPHILS # BLD AUTO: 0.1 10E3/UL (ref 0–0.2)
BASOPHILS NFR BLD AUTO: 1 %
BUN SERPL-MCNC: 12.7 MG/DL (ref 6–20)
CALCIUM SERPL-MCNC: 9.2 MG/DL (ref 8.6–10)
CHLORIDE SERPL-SCNC: 105 MMOL/L (ref 98–107)
CREAT SERPL-MCNC: 0.79 MG/DL (ref 0.67–1.17)
DEPRECATED HCO3 PLAS-SCNC: 29 MMOL/L (ref 22–29)
EGFRCR SERPLBLD CKD-EPI 2021: >90 ML/MIN/1.73M2
EOSINOPHIL # BLD AUTO: 0.2 10E3/UL (ref 0–0.7)
EOSINOPHIL NFR BLD AUTO: 3 %
ERYTHROCYTE [DISTWIDTH] IN BLOOD BY AUTOMATED COUNT: 18.4 % (ref 10–15)
GLUCOSE BLDC GLUCOMTR-MCNC: 117 MG/DL (ref 70–99)
GLUCOSE SERPL-MCNC: 96 MG/DL (ref 70–99)
HCT VFR BLD AUTO: 32.8 % (ref 40–53)
HGB BLD-MCNC: 9.8 G/DL (ref 13.3–17.7)
IMM GRANULOCYTES # BLD: 0 10E3/UL
IMM GRANULOCYTES NFR BLD: 0 %
LVEF ECHO: NORMAL
LYMPHOCYTES # BLD AUTO: 3 10E3/UL (ref 0.8–5.3)
LYMPHOCYTES NFR BLD AUTO: 39 %
MAGNESIUM SERPL-MCNC: 2 MG/DL (ref 1.7–2.3)
MCH RBC QN AUTO: 25.4 PG (ref 26.5–33)
MCHC RBC AUTO-ENTMCNC: 29.9 G/DL (ref 31.5–36.5)
MCV RBC AUTO: 85 FL (ref 78–100)
MONOCYTES # BLD AUTO: 0.9 10E3/UL (ref 0–1.3)
MONOCYTES NFR BLD AUTO: 12 %
NEUTROPHILS # BLD AUTO: 3.5 10E3/UL (ref 1.6–8.3)
NEUTROPHILS NFR BLD AUTO: 45 %
NRBC # BLD AUTO: 0 10E3/UL
NRBC BLD AUTO-RTO: 0 /100
PHOSPHATE SERPL-MCNC: 4.3 MG/DL (ref 2.5–4.5)
PLATELET # BLD AUTO: 303 10E3/UL (ref 150–450)
POTASSIUM SERPL-SCNC: 3.9 MMOL/L (ref 3.4–5.3)
RBC # BLD AUTO: 3.86 10E6/UL (ref 4.4–5.9)
SODIUM SERPL-SCNC: 142 MMOL/L (ref 135–145)
WBC # BLD AUTO: 7.8 10E3/UL (ref 4–11)

## 2023-11-17 PROCEDURE — 250N000011 HC RX IP 250 OP 636: Performed by: ANESTHESIOLOGY

## 2023-11-17 PROCEDURE — 360N000075 HC SURGERY LEVEL 2, PER MIN

## 2023-11-17 PROCEDURE — 93325 DOPPLER ECHO COLOR FLOW MAPG: CPT

## 2023-11-17 PROCEDURE — 36558 INSERT TUNNELED CV CATH: CPT | Performed by: PHYSICIAN ASSISTANT

## 2023-11-17 PROCEDURE — 02HV33Z INSERTION OF INFUSION DEVICE INTO SUPERIOR VENA CAVA, PERCUTANEOUS APPROACH: ICD-10-PCS | Performed by: PHYSICIAN ASSISTANT

## 2023-11-17 PROCEDURE — 76937 US GUIDE VASCULAR ACCESS: CPT | Mod: 26 | Performed by: PHYSICIAN ASSISTANT

## 2023-11-17 PROCEDURE — 93312 ECHO TRANSESOPHAGEAL: CPT | Mod: 26 | Performed by: INTERNAL MEDICINE

## 2023-11-17 PROCEDURE — 370N000017 HC ANESTHESIA TECHNICAL FEE, PER MIN

## 2023-11-17 PROCEDURE — 250N000013 HC RX MED GY IP 250 OP 250 PS 637

## 2023-11-17 PROCEDURE — 93320 DOPPLER ECHO COMPLETE: CPT | Mod: 26 | Performed by: INTERNAL MEDICINE

## 2023-11-17 PROCEDURE — 250N000013 HC RX MED GY IP 250 OP 250 PS 637: Performed by: STUDENT IN AN ORGANIZED HEALTH CARE EDUCATION/TRAINING PROGRAM

## 2023-11-17 PROCEDURE — 82310 ASSAY OF CALCIUM: CPT | Performed by: PHARMACIST

## 2023-11-17 PROCEDURE — 36415 COLL VENOUS BLD VENIPUNCTURE: CPT | Performed by: INTERNAL MEDICINE

## 2023-11-17 PROCEDURE — 93325 DOPPLER ECHO COLOR FLOW MAPG: CPT | Mod: 26 | Performed by: INTERNAL MEDICINE

## 2023-11-17 PROCEDURE — 99239 HOSP IP/OBS DSCHRG MGMT >30: CPT | Performed by: INTERNAL MEDICINE

## 2023-11-17 PROCEDURE — 272N000504 HC NEEDLE CR4

## 2023-11-17 PROCEDURE — 250N000009 HC RX 250: Performed by: ANESTHESIOLOGY

## 2023-11-17 PROCEDURE — 250N000011 HC RX IP 250 OP 636: Mod: JZ

## 2023-11-17 PROCEDURE — 710N000010 HC RECOVERY PHASE 1, LEVEL 2, PER MIN

## 2023-11-17 PROCEDURE — 999N000141 HC STATISTIC PRE-PROCEDURE NURSING ASSESSMENT

## 2023-11-17 PROCEDURE — 84100 ASSAY OF PHOSPHORUS: CPT | Performed by: PHARMACIST

## 2023-11-17 PROCEDURE — 250N000009 HC RX 250: Performed by: PHYSICIAN ASSISTANT

## 2023-11-17 PROCEDURE — 250N000011 HC RX IP 250 OP 636: Performed by: PHYSICIAN ASSISTANT

## 2023-11-17 PROCEDURE — 99207 PR NO CHARGE LOS: CPT | Performed by: STUDENT IN AN ORGANIZED HEALTH CARE EDUCATION/TRAINING PROGRAM

## 2023-11-17 PROCEDURE — 77001 FLUOROGUIDE FOR VEIN DEVICE: CPT

## 2023-11-17 PROCEDURE — 77001 FLUOROGUIDE FOR VEIN DEVICE: CPT | Mod: 26 | Performed by: PHYSICIAN ASSISTANT

## 2023-11-17 PROCEDURE — 258N000003 HC RX IP 258 OP 636: Performed by: ANESTHESIOLOGY

## 2023-11-17 PROCEDURE — 250N000009 HC RX 250: Performed by: NURSE ANESTHETIST, CERTIFIED REGISTERED

## 2023-11-17 PROCEDURE — 0JH63XZ INSERTION OF TUNNELED VASCULAR ACCESS DEVICE INTO CHEST SUBCUTANEOUS TISSUE AND FASCIA, PERCUTANEOUS APPROACH: ICD-10-PCS | Performed by: PHYSICIAN ASSISTANT

## 2023-11-17 PROCEDURE — 83735 ASSAY OF MAGNESIUM: CPT | Performed by: PHARMACIST

## 2023-11-17 PROCEDURE — 250N000011 HC RX IP 250 OP 636: Performed by: NURSE ANESTHETIST, CERTIFIED REGISTERED

## 2023-11-17 PROCEDURE — 85025 COMPLETE CBC W/AUTO DIFF WBC: CPT | Performed by: INTERNAL MEDICINE

## 2023-11-17 PROCEDURE — C1751 CATH, INF, PER/CENT/MIDLINE: HCPCS

## 2023-11-17 RX ORDER — HEPARIN SODIUM,PORCINE 10 UNIT/ML
1-5 VIAL (ML) INTRAVENOUS ONCE
Status: COMPLETED | OUTPATIENT
Start: 2023-11-17 | End: 2023-11-17

## 2023-11-17 RX ORDER — FENTANYL CITRATE 50 UG/ML
INJECTION, SOLUTION INTRAMUSCULAR; INTRAVENOUS PRN
Status: DISCONTINUED | OUTPATIENT
Start: 2023-11-17 | End: 2023-11-17

## 2023-11-17 RX ORDER — LABETALOL HYDROCHLORIDE 5 MG/ML
5 INJECTION, SOLUTION INTRAVENOUS EVERY 5 MIN PRN
Status: DISCONTINUED | OUTPATIENT
Start: 2023-11-17 | End: 2023-11-17 | Stop reason: HOSPADM

## 2023-11-17 RX ORDER — PROPOFOL 10 MG/ML
INJECTION, EMULSION INTRAVENOUS CONTINUOUS PRN
Status: DISCONTINUED | OUTPATIENT
Start: 2023-11-17 | End: 2023-11-17

## 2023-11-17 RX ORDER — SODIUM CHLORIDE, SODIUM LACTATE, POTASSIUM CHLORIDE, CALCIUM CHLORIDE 600; 310; 30; 20 MG/100ML; MG/100ML; MG/100ML; MG/100ML
INJECTION, SOLUTION INTRAVENOUS CONTINUOUS
Status: DISCONTINUED | OUTPATIENT
Start: 2023-11-17 | End: 2023-11-17 | Stop reason: HOSPADM

## 2023-11-17 RX ORDER — CEFTRIAXONE 2 G/1
2 INJECTION, POWDER, FOR SOLUTION INTRAMUSCULAR; INTRAVENOUS EVERY 24 HOURS
Status: CANCELLED
Start: 2023-11-19

## 2023-11-17 RX ORDER — ONDANSETRON 2 MG/ML
INJECTION INTRAMUSCULAR; INTRAVENOUS PRN
Status: DISCONTINUED | OUTPATIENT
Start: 2023-11-17 | End: 2023-11-17

## 2023-11-17 RX ORDER — ONDANSETRON 2 MG/ML
4 INJECTION INTRAMUSCULAR; INTRAVENOUS EVERY 30 MIN PRN
Status: DISCONTINUED | OUTPATIENT
Start: 2023-11-17 | End: 2023-11-17 | Stop reason: HOSPADM

## 2023-11-17 RX ORDER — CEFTRIAXONE 2 G/1
2 INJECTION, POWDER, FOR SOLUTION INTRAMUSCULAR; INTRAVENOUS EVERY 24 HOURS
Status: ACTIVE
Start: 2023-11-18 | End: 2023-11-29

## 2023-11-17 RX ORDER — FENTANYL CITRATE 50 UG/ML
50 INJECTION, SOLUTION INTRAMUSCULAR; INTRAVENOUS EVERY 5 MIN PRN
Status: DISCONTINUED | OUTPATIENT
Start: 2023-11-17 | End: 2023-11-17 | Stop reason: HOSPADM

## 2023-11-17 RX ORDER — PROPOFOL 10 MG/ML
INJECTION, EMULSION INTRAVENOUS PRN
Status: DISCONTINUED | OUTPATIENT
Start: 2023-11-17 | End: 2023-11-17

## 2023-11-17 RX ORDER — SODIUM CHLORIDE, SODIUM LACTATE, POTASSIUM CHLORIDE, CALCIUM CHLORIDE 600; 310; 30; 20 MG/100ML; MG/100ML; MG/100ML; MG/100ML
INJECTION, SOLUTION INTRAVENOUS CONTINUOUS PRN
Status: DISCONTINUED | OUTPATIENT
Start: 2023-11-17 | End: 2023-11-17

## 2023-11-17 RX ORDER — ONDANSETRON 4 MG/1
4 TABLET, ORALLY DISINTEGRATING ORAL EVERY 30 MIN PRN
Status: DISCONTINUED | OUTPATIENT
Start: 2023-11-17 | End: 2023-11-17 | Stop reason: HOSPADM

## 2023-11-17 RX ORDER — ALBUTEROL SULFATE 0.83 MG/ML
2.5 SOLUTION RESPIRATORY (INHALATION) EVERY 4 HOURS PRN
Status: DISCONTINUED | OUTPATIENT
Start: 2023-11-17 | End: 2023-11-17 | Stop reason: HOSPADM

## 2023-11-17 RX ORDER — HYDROMORPHONE HCL IN WATER/PF 6 MG/30 ML
0.4 PATIENT CONTROLLED ANALGESIA SYRINGE INTRAVENOUS EVERY 5 MIN PRN
Status: DISCONTINUED | OUTPATIENT
Start: 2023-11-17 | End: 2023-11-17 | Stop reason: HOSPADM

## 2023-11-17 RX ORDER — HYDRALAZINE HYDROCHLORIDE 20 MG/ML
2.5-5 INJECTION INTRAMUSCULAR; INTRAVENOUS EVERY 10 MIN PRN
Status: DISCONTINUED | OUTPATIENT
Start: 2023-11-17 | End: 2023-11-17 | Stop reason: HOSPADM

## 2023-11-17 RX ORDER — HEPARIN SODIUM,PORCINE 10 UNIT/ML
5-20 VIAL (ML) INTRAVENOUS EVERY 24 HOURS
Status: DISCONTINUED | OUTPATIENT
Start: 2023-11-17 | End: 2023-11-17 | Stop reason: HOSPADM

## 2023-11-17 RX ORDER — HEPARIN SODIUM,PORCINE 10 UNIT/ML
5-20 VIAL (ML) INTRAVENOUS
Status: DISCONTINUED | OUTPATIENT
Start: 2023-11-17 | End: 2023-11-17 | Stop reason: HOSPADM

## 2023-11-17 RX ORDER — LIDOCAINE HYDROCHLORIDE 20 MG/ML
INJECTION, SOLUTION INFILTRATION; PERINEURAL PRN
Status: DISCONTINUED | OUTPATIENT
Start: 2023-11-17 | End: 2023-11-17

## 2023-11-17 RX ORDER — HYDROMORPHONE HCL IN WATER/PF 6 MG/30 ML
0.2 PATIENT CONTROLLED ANALGESIA SYRINGE INTRAVENOUS EVERY 5 MIN PRN
Status: DISCONTINUED | OUTPATIENT
Start: 2023-11-17 | End: 2023-11-17 | Stop reason: HOSPADM

## 2023-11-17 RX ORDER — FENTANYL CITRATE 50 UG/ML
25 INJECTION, SOLUTION INTRAMUSCULAR; INTRAVENOUS EVERY 5 MIN PRN
Status: DISCONTINUED | OUTPATIENT
Start: 2023-11-17 | End: 2023-11-17 | Stop reason: HOSPADM

## 2023-11-17 RX ORDER — DEXMEDETOMIDINE HYDROCHLORIDE 4 UG/ML
INJECTION, SOLUTION INTRAVENOUS PRN
Status: DISCONTINUED | OUTPATIENT
Start: 2023-11-17 | End: 2023-11-17

## 2023-11-17 RX ADMIN — LIDOCAINE HYDROCHLORIDE 60 MG: 20 INJECTION, SOLUTION INFILTRATION; PERINEURAL at 13:35

## 2023-11-17 RX ADMIN — Medication 8 MCG: at 13:35

## 2023-11-17 RX ADMIN — CEFTRIAXONE SODIUM 2 G: 2 INJECTION, POWDER, FOR SOLUTION INTRAMUSCULAR; INTRAVENOUS at 03:44

## 2023-11-17 RX ADMIN — SODIUM CHLORIDE, POTASSIUM CHLORIDE, SODIUM LACTATE AND CALCIUM CHLORIDE: 600; 310; 30; 20 INJECTION, SOLUTION INTRAVENOUS at 13:28

## 2023-11-17 RX ADMIN — SUGAMMADEX 200 MG: 100 INJECTION, SOLUTION INTRAVENOUS at 13:51

## 2023-11-17 RX ADMIN — HEPARIN, PORCINE (PF) 10 UNIT/ML INTRAVENOUS SYRINGE 5 ML: at 11:35

## 2023-11-17 RX ADMIN — Medication 50 MG: at 13:35

## 2023-11-17 RX ADMIN — FENTANYL CITRATE 100 MCG: 50 INJECTION INTRAMUSCULAR; INTRAVENOUS at 13:35

## 2023-11-17 RX ADMIN — OXYCODONE HYDROCHLORIDE 10 MG: 5 SOLUTION ORAL at 09:16

## 2023-11-17 RX ADMIN — ALPRAZOLAM 0.5 MG: 0.5 TABLET ORAL at 09:16

## 2023-11-17 RX ADMIN — PROPOFOL 50 MCG/KG/MIN: 10 INJECTION, EMULSION INTRAVENOUS at 13:28

## 2023-11-17 RX ADMIN — ENOXAPARIN SODIUM 80 MG: 80 INJECTION SUBCUTANEOUS at 09:09

## 2023-11-17 RX ADMIN — PROPOFOL 200 MG: 10 INJECTION, EMULSION INTRAVENOUS at 13:35

## 2023-11-17 RX ADMIN — OXYCODONE HYDROCHLORIDE 10 MG: 5 SOLUTION ORAL at 03:48

## 2023-11-17 RX ADMIN — LIDOCAINE HYDROCHLORIDE 5 ML: 10 INJECTION, SOLUTION EPIDURAL; INFILTRATION; INTRACAUDAL; PERINEURAL at 11:26

## 2023-11-17 RX ADMIN — DEXTROAMPHETAMINE SACCHARATE, AMPHETAMINE ASPARTATE, DEXTROAMPHETAMINE SULFATE, AMPHETAMINE SULFATE TABLETS, 10 MG,CLL 20 MG: 2.5; 2.5; 2.5; 2.5 TABLET ORAL at 09:09

## 2023-11-17 RX ADMIN — ONDANSETRON 4 MG: 2 INJECTION INTRAMUSCULAR; INTRAVENOUS at 13:51

## 2023-11-17 RX ADMIN — Medication 1 LOZENGE: at 15:01

## 2023-11-17 RX ADMIN — MIDAZOLAM 2 MG: 1 INJECTION INTRAMUSCULAR; INTRAVENOUS at 13:28

## 2023-11-17 RX ADMIN — PANTOPRAZOLE SODIUM 40 MG: 40 TABLET, DELAYED RELEASE ORAL at 09:09

## 2023-11-17 ASSESSMENT — ACTIVITIES OF DAILY LIVING (ADL)
ADLS_ACUITY_SCORE: 37

## 2023-11-17 ASSESSMENT — LIFESTYLE VARIABLES: TOBACCO_USE: 1

## 2023-11-17 NOTE — OR NURSING
Unable to print telemetry strip in PACU.  Appears normal MI and QRS intervals.  Appears normal sinus rhythm.

## 2023-11-17 NOTE — PROGRESS NOTES
Pt. discharged at 1717 to home, and left with personal belongings. Pt. received complete discharge paperwork. Pt. was given times of last dose for all medications. Discharge teaching included antibiotics medication, pain management, and signs and symptoms of infection. Pt. to follow up with PCP in 7 days. Pt. had no further questions at the time of discharge and no unmet needs were identified.

## 2023-11-17 NOTE — ANESTHESIA CARE TRANSFER NOTE
Patient: Praker Acevedo    Procedure: Procedure(s):  ECHOCARDIOGRAM,TRANSESOPHAGEAL,WITH ANESTHESIA       Diagnosis: Endocarditis [I38]  Diagnosis Additional Information: No value filed.    Anesthesia Type:   General     Note:    Oropharynx: oropharynx clear of all foreign objects  Level of Consciousness: awake  Oxygen Supplementation: face mask  Level of Supplemental Oxygen (L/min / FiO2): 8L  Independent Airway: airway patency satisfactory and stable  Dentition: dentition unchanged  Vital Signs Stable: post-procedure vital signs reviewed and stable  Report to RN Given: handoff report given  Patient transferred to: PACU    Handoff Report: Identifed the Patient, Identified the Reponsible Provider, Reviewed the pertinent medical history, Discussed the surgical course, Reviewed Intra-OP anesthesia mangement and issues during anesthesia, Set expectations for post-procedure period and Allowed opportunity for questions and acknowledgement of understanding      Vitals:  Vitals Value Taken Time   /70 11/17/23 1410   Temp     Pulse 79 11/17/23 1413   Resp     SpO2 99 % 11/17/23 1413   Vitals shown include unfiled device data.    Electronically Signed By: SAILAJA Vazquez CRNA  November 17, 2023  2:14 PM

## 2023-11-17 NOTE — DISCHARGE INSTRUCTIONS
Patient should have weekly labs drawn while on IV ceftriaxone, starting 11/22/2023. Please send labs to Dr. Mitchell at ID clinic:  [facility] Information:  Phone: [phone]  Fax: [phone] (Attention Carlos Sorensen)

## 2023-11-17 NOTE — ANESTHESIA PREPROCEDURE EVALUATION
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    Complication of anesthesia     Difficulty swallowing     Gastric ulcer, unspecified as acute or chronic, without mention of hemorrhage, perforation, or obstruction     Gastro-oesophageal reflux disease     Head injury     Hiatal hernia     Other bladder disorder     Other chronic pain     PONV (postoperative nausea and vomiting)     Severe malnutrition (H24)     TPN    Short gut syndrome     Tobacco abuse       Past Surgical History:   Procedure Laterality Date    AMPUTATION      APPENDECTOMY      BACK SURGERY  11/3/2014    curve in the spine    BIOPSY LYMPH NODE CERVICAL N/A 2015    Procedure: BIOPSY LYMPH NODE CERVICAL;  Surgeon: Baron Scanlon MD;  [facility]    CHOLECYSTECTOMY      COLONOSCOPY N/A 2021    Procedure: COLONOSCOPY;  Surgeon: Jimbo Estrada MD;  Location: [facility] OR    COLONOSCOPY N/A 2022    Procedure: COLONOSCOPY;  Surgeon: Jimbo Estrada MD;  Location: [facility] OR    COLONOSCOPY N/A 2023    Procedure: Colonoscopy;  Surgeon: Jimbo Estrada MD;  Location: [facility] OR    DISCECTOMY, FUSION CERVICAL ANTERIOR ONE LEVEL, COMBINED N/A 2/15/2017    Procedure: COMBINED DISCECTOMY, FUSION CERVICAL ANTERIOR ONE LEVEL;  Surgeon: Darren Campos MD;  [facility]    ENDOSCOPIC INSERTION TUBE GASTROSTOMY  2013    Procedure: ENDOSCOPIC INSERTION TUBE GASTROSTOMY;;  Surgeon: Francis Vyas MD;  [facility]    ENDOSCOPIC ULTRASOUND UPPER  GASTROINTESTINAL TRACT (GI)  4/29/2011    Procedure:ENDOSCOPIC ULTRASOUND UPPER GASTROINTESTINAL TRACT (GI); Both Procedures done Conjointly; Surgeon:NEREIDA HOUSER; [facility]    ENDOSCOPIC ULTRASOUND UPPER GASTROINTESTINAL TRACT (GI)  9/9/2013    Procedure: ENDOSCOPIC ULTRASOUND UPPER GASTROINTESTINAL TRACT (GI);  Endoscopic Ultrasound Guide Gastrostomy Tube Placement  C-arm;  Surgeon: Noe Lizarraga MD;  [facility]    ENDOSCOPIC ULTRASOUND UPPER GASTROINTESTINAL TRACT (GI) N/A 2/24/2021    Procedure: ENDOSCOPIC ULTRASOUND, ESOPHAGOSCOPY / UPPER GASTROINTESTINAL TRACT (GI), esophagastrogastroduodenoscopy;  Surgeon: Berny Bach MD;  [facility]    ENDOSCOPY  03/25/11    EGD, [facility]    ENDOSCOPY  08/04/09    Upper Endoscopy, [facility]    ENDOSCOPY  01/05/09    Upper Endoscopy, [facility]    ESOPHAGOSCOPY, GASTROSCOPY, DUODENOSCOPY (EGD), COMBINED  4/20/2011    Procedure:COMBINED ESOPHAGOSCOPY, GASTROSCOPY, DUODENOSCOPY (EGD); Surgeon:BLU VYAS; [facility]    ESOPHAGOSCOPY, GASTROSCOPY, DUODENOSCOPY (EGD), COMBINED  6/15/2011    Procedure:COMBINED ESOPHAGOSCOPY, GASTROSCOPY, DUODENOSCOPY (EGD); Surgeon:BLU VYAS; [facility]    ESOPHAGOSCOPY, GASTROSCOPY, DUODENOSCOPY (EGD), COMBINED  6/12/2013    Procedure: COMBINED ESOPHAGOSCOPY, GASTROSCOPY, DUODENOSCOPY (EGD);;  Surgeon: Blu Vyas MD;  [facility]    ESOPHAGOSCOPY, GASTROSCOPY, DUODENOSCOPY (EGD), COMBINED  11/22/2013    Procedure: COMBINED ESOPHAGOSCOPY, GASTROSCOPY, DUODENOSCOPY (EGD);;  Surgeon: Blu Vyas MD;  [facility]    ESOPHAGOSCOPY, GASTROSCOPY, DUODENOSCOPY (EGD), COMBINED  4/30/2014    Procedure: COMBINED ESOPHAGOSCOPY, GASTROSCOPY, DUODENOSCOPY (EGD);  Surgeon: Blu Vyas MD;  [facility]    ESOPHAGOSCOPY, GASTROSCOPY, DUODENOSCOPY (EGD), COMBINED N/A 2/20/2015    Procedure: COMBINED ESOPHAGOSCOPY, GASTROSCOPY,  DUODENOSCOPY (EGD), BIOPSY SINGLE OR MULTIPLE;  Surgeon: Baron Scanlon MD;  [facility]    ESOPHAGOSCOPY, GASTROSCOPY, DUODENOSCOPY (EGD), COMBINED N/A 9/30/2015    Procedure: COMBINED ESOPHAGOSCOPY, GASTROSCOPY, DUODENOSCOPY (EGD);  Surgeon: Francis Vyas MD;  Location:  GI    ESOPHAGOSCOPY, GASTROSCOPY, DUODENOSCOPY (EGD), COMBINED N/A 10/3/2019    Procedure: Upper Endoscopy;  Surgeon: Clif Morrow MD;  [facility]    GASTRECTOMY  6/22/2012    Procedure: GASTRECTOMY;  Open Approach, Excise Ulcers,Partial Gastrectomy, Esophagojejunostomy, Hiatal Hernia Repair, Extensive Lysis of Adhesions and Esaphagogastrodudenoscopy.;  Surgeon: Francis Vyas MD;  [facility]    GASTROJEJUNOSTOMY  08/26/09    Extensice enterolysis, partial resect. jejunum, part. resect gastric pouch, gastrojejunostomy anastomosis    HC ESOPH/GAS REFLUX TEST W NASAL IMPED ELECTRODE  8/5/2013    Procedure: ESOPHAGEAL IMPEDENCE FUNCTION TEST 1 HOUR OR LESS;  Surgeon: Halie Lang MD;  Location:  GI    HEAD & NECK SURGERY  2/15/2017    C5-C6    HERNIA REPAIR  2006    Umbilical hernia    HERNIORRHAPHY HIATAL  6/22/2012    Procedure: HERNIORRHAPHY HIATAL;;  Surgeon: Francis Vyas MD;  [facility]    HERNIORRHAPHY INGUINAL  11/22/2013    Procedure: HERNIORRHAPHY INGUINAL;;  Surgeon: Francis Vyas MD;  [facility]    INSERT PICC LINE Right 12/19/2019    Procedure: Picc Placement;  Surgeon: Per Dumont PA-C;  [facility]    INSERT PICC LINE Right 2/21/2020    Procedure: INSERTION, PICC;  Surgeon: Per Dumont PA-C;  [facility]    INSERT PORT VASCULAR ACCESS Right 12/19/2017    Procedure: INSERT PORT VASCULAR ACCESS;  Right Chest Port Placement ;  Surgeon: Lisandro Alejandro PA-C;  [facility]    INSERT PORT VASCULAR ACCESS Right 8/2/2018    Procedure: INSERT PORT VASCULAR ACCESS;  Place single lumen tunneled central venous access catheter;  Surgeon:  Guy Jamil PA-C;  [facility]    IR CVC TUNNEL PLACEMENT > 5 YRS OF AGE  8/7/2019    IR CVC TUNNEL PLACEMENT > 5 YRS OF AGE  4/14/2020    IR CVC TUNNEL PLACEMENT > 5 YRS OF AGE  8/3/2020    IR CVC TUNNEL PLACEMENT > 5 YRS OF AGE  9/4/2020    IR CVC TUNNEL PLACEMENT > 5 YRS OF AGE  2/5/2021    IR CVC TUNNEL PLACEMENT > 5 YRS OF AGE  3/23/2021    IR CVC TUNNEL PLACEMENT > 5 YRS OF AGE  1/13/2022    IR CVC TUNNEL PLACEMENT > 5 YRS OF AGE  5/12/2022    IR CVC TUNNEL PLACEMENT > 5 YRS OF AGE  7/13/2022    IR CVC TUNNEL PLACEMENT > 5 YRS OF AGE  7/10/2023    IR CVC TUNNEL REMOVAL LEFT  6/7/2023    IR CVC TUNNEL REMOVAL RIGHT  10/1/2019    IR CVC TUNNEL REMOVAL RIGHT  7/30/2020    IR CVC TUNNEL REMOVAL RIGHT  9/2/2020    IR CVC TUNNEL REMOVAL RIGHT  2/3/2021    IR CVC TUNNEL REMOVAL RIGHT  3/19/2021    IR CVC TUNNEL REMOVAL RIGHT  1/10/2022    IR CVC TUNNEL REMOVAL RIGHT  5/9/2022    IR CVC TUNNEL REMOVAL RIGHT  7/8/2022    IR CVC TUNNEL REVISION LEFT  8/10/2022    IR CVC TUNNEL REVISION LEFT  9/19/2023    IR CVC TUNNEL REVISION RIGHT  5/7/2021    IR FOLLOW UP VISIT OUTPATIENT  8/7/2019    IR PICC EXCHANGE LEFT  6/8/2023    IR PICC PLACEMENT > 5 YRS OF AGE  3/7/2019    IR PICC PLACEMENT > 5 YRS OF AGE  12/19/2019    IR PICC PLACEMENT > 5 YRS OF AGE  2/21/2020    IR PICC PLACEMENT > 5 YRS OF AGE  6/7/2023    LAPAROTOMY EXPLORATORY  11/22/2013    Procedure: LAPAROTOMY EXPLORATORY;  Exploratory Laparotomy, Upper Endoscopy, Left Inguinal Hernia Repair;  Surgeon: Francis Vyas MD;  [facility]    LAPAROTOMY EXPLORATORY N/A 9/30/2023    Procedure: Laparotomy exploratory, reduction of intussusception, resection of small bowel with primary anastamosis, upper endoscopy;  Surgeon: Delvis Mercado MD;  [facility]    ORTHOPEDIC SURGERY      PICC INSERTION Right 03/16/2017    5fr DL BioFlo PICC, 42cm (3cm external) in the R medial brachial vein w/ tip in the SVC RA junction.    PICC INSERTION  Left 09/23/2017    5fr DL BioFlo PICC, 45cm (1cm external) in the L basilic vein w/ tip in the SVC RA junction.    PICC INSERTION Right 05/16/2019    5Fr - 43cm, Medial brachial vein, low SVC    PICC INSERTION Right 10/02/2019    5Fr - 43cm (2cm external), basilic vein, low SVC    SHAYLEE EN Y BOWEL  2003    SOFT TISSUE SURGERY      THORACIC SURGERY      TONSILLECTOMY      TRANSESOPHAGEAL ECHOCARDIOGRAM INTRAOPERATIVE N/A 1/8/2019    Procedure: TRANSESOPHAGEAL ECHOCARDIOGRAM INTRAOPERATIVE;  Surgeon: GENERIC ANESTHESIA PROVIDER;  [facility]    TRANSESOPHAGEAL ECHOCARDIOGRAM INTRAOPERATIVE N/A 7/7/2023    Procedure: Transesophageal echocardiogram in the OR;  Surgeon: GENERIC ANESTHESIA PROVIDER;  [facility]    ZZC GASTRIC BYPASS,OBESE<100CM SHAYLEE-EN-Y  2002    lost 300 pounds      Allergies   Allergen Reactions    Bactrim [Sulfamethoxazole-Trimethoprim] Rash    Penicillins Anaphylaxis     Please see Antimicrobial Management Team allergy assessment note 10/10/2018. Patient reported tolerating amoxicillin.  Tolerating cefepime and ceftriaxone without reaction 6/23    Ertapenem Nausea and Vomiting    Doxycycline Rash    Vancomycin Rash     Rash after receiving vancomycin 3/28/16 (infusion reaction?). Tolerated with slower infusion and diphenhydramine premed.  Tolerated 1250mg over 90minutes 7/2023.      Social History     Tobacco Use    Smoking status: Light Smoker     Packs/day: 0.50     Years: 3.00     Additional pack years: 0.00     Total pack years: 1.50     Types: Cigarettes    Smokeless tobacco: Never    Tobacco comments:     2/4/2021    smokes 3 cigarettes/day   Substance Use Topics    Alcohol use: No     Comment: quit 2002      Wt Readings from Last 1 Encounters:   11/17/23 85.7 kg (189 lb)        Anesthesia Evaluation   Pt has had prior anesthetic. Type: General and MAC.    History of anesthetic complications  - PONV.      ROS/MED HX  ENT/Pulmonary:     (+)                tobacco use, Current use,                       Neurologic:  - neg neurologic ROS     Cardiovascular:     (+)  hypertension- -   -  - -                                 Previous cardiac testing   Echo: Date: 11/15/23 Results:  Interpretation Summary  Technically difficult study.  Global and regional left ventricular function is normal with an EF of 55-60%.  Global right ventricular function is normal.  This study was compared with the study from 7/6/23: No significant changes  noted.  ______________________________________________________________________________  Left Ventricle  Global and regional left ventricular function is normal with an EF of 55-60%.  Left ventricular size is normal. Left ventricular wall thickness cannot  evaluate. Left ventricular diastolic function is not assessable.     Right Ventricle  The right ventricle is normal size. Global right ventricular function is  normal.     Mitral Valve  The mitral valve is normal. Trace mitral insufficiency is present.     Aortic Valve  The valve leaflets are not well visualized. On Doppler interrogation, there is  no significant stenosis or regurgitation.     Tricuspid Valve  The tricuspid valve is normal. Trace tricuspid insufficiency is present. The  peak velocity of the tricuspid regurgitant jet is not obtainable. Pulmonary  artery systolic pressure cannot be assessed.     Pulmonic Valve  The pulmonic valve cannot be assessed.     Vessels  The aorta root cannot be assessed. Dilation of the inferior vena cava is  present with normal respiratory variation in diameter. IVC diameter and  respiratory changes fall into an intermediate range suggesting an RA pressure  of 8 mmHg.     Pericardium  No pericardial effusion is present.     Compared to Previous Study  This study was compared with the study from 7/6/23 . No significant changes  noted.      Stress Test:  Date: Results:    ECG Reviewed:  Date: Results:    Cath:  Date: Results:      METS/Exercise Tolerance:     Hematologic:     (+)       anemia,          Musculoskeletal:       GI/Hepatic: Comment: s/p gastric bypass; has short gut syndrome, TPN dependent.    (+) GERD,     hiatal hernia,              Renal/Genitourinary:  - neg Renal ROS     Endo:     (+)               Obesity,       Psychiatric/Substance Use:     (+) psychiatric history   H/O chronic opiod use .     Infectious Disease:     (+) Recent Fever,           Malignancy:       Other:      (+)  , H/O Chronic Pain,         Physical Exam    Airway        Mallampati: I   TM distance: > 3 FB   Neck ROM: full   Mouth opening: > 3 cm    Respiratory Devices and Support         Dental       (+) Edentulous      Cardiovascular             Pulmonary                   OUTSIDE LABS:  CBC:   Lab Results   Component Value Date    WBC 7.8 11/17/2023    WBC 6.6 11/16/2023    HGB 9.8 (L) 11/17/2023    HGB 9.3 (L) 11/16/2023    HCT 32.8 (L) 11/17/2023    HCT 31.3 (L) 11/16/2023     11/17/2023     11/16/2023     BMP:   Lab Results   Component Value Date     11/17/2023     11/16/2023    POTASSIUM 3.9 11/17/2023    POTASSIUM 4.0 11/16/2023    CHLORIDE 105 11/17/2023    CHLORIDE 107 11/16/2023    CO2 29 11/17/2023    CO2 25 11/16/2023    BUN 12.7 11/17/2023    BUN 11.3 11/16/2023    CR 0.79 11/17/2023    CR 0.72 11/16/2023    GLC 96 11/17/2023     (H) 11/17/2023     COAGS:   Lab Results   Component Value Date    PTT 25 09/29/2023    INR 1.09 11/15/2023    FIBR 345 07/07/2022     POC:   Lab Results   Component Value Date    BGM 83 03/23/2021     HEPATIC:   Lab Results   Component Value Date    ALBUMIN 3.9 11/15/2023    PROTTOTAL 7.2 11/15/2023    ALT 10 11/15/2023    AST 14 11/15/2023    ALKPHOS 64 11/15/2023    BILITOTAL 0.2 11/15/2023     OTHER:   Lab Results   Component Value Date    LACT 0.8 11/14/2023    A1C 4.9 07/23/2013    SILAS 9.2 11/17/2023    PHOS 4.3 11/17/2023    MAG 2.0 11/17/2023    LIPASE 11 (L) 09/29/2023    AMYLASE 178 (H) 06/28/2012    TSH 4.06 07/07/2022    CRP  <2.9 10/04/2022    SED 17 07/04/2023       Anesthesia Plan    ASA Status:  3    NPO Status:  ELEVATED Aspiration Risk/Unknown    Anesthesia Type: General.     - Airway: ETT   Induction: Intravenous, Propofol, RSI.   Maintenance: TIVA.        Consents    Anesthesia Plan(s) and associated risks, benefits, and realistic alternatives discussed. Questions answered and patient/representative(s) expressed understanding.     - Discussed:     - Discussed with:  Patient      - Extended Intubation/Ventilatory Support Discussed: No.      - Patient is DNR/DNI Status: No          Postoperative Care    Pain management: IV analgesics, Multi-modal analgesia.   PONV prophylaxis: Ondansetron (or other 5HT-3), Background Propofol Infusion     Comments:           H&P reviewed: Unable to attach H&P to encounter due to EHR limitations. H&P Update: appropriate H&P reviewed, patient examined. No interval changes since H&P (within 30 days).         Álvaro Anderson MD

## 2023-11-17 NOTE — PROGRESS NOTES
Infusion Therapy-[facility]-Nurse Liaison-Current [facility] patient    Pt has been on service with [facility] prior to this hospital stay and will resume care with [facility].   DC today.   DC THERAPIES: TPN, HYDRATION, CLC, NEW rocephin- VIA CADD-dosing 0400. Had dose today  LINE: REMOVED,-DL [facility]ick  DELIVERY/SUPPLIES: To pt home by 18-20, has two TPN bags, and several bags of hydration in the home. No other supplies needed. Will dose Rocephin at 10am daily, starting 11.18  AGENCY: [facility]  SNV: tbd  note: Spouse is independ with [employer] pumps, Per CC, geting  a Line placed today,  Per spouse- No changes to demographics, insurance, allergies, etc.  She is independent with infusions.She verbalizes understanding, and knows how to contact  [facility], also understands we have an RN/RPH on call 24/7. This Liaison will continue to follow until dc for any changes or additional needs Reviewed Rocephin administration with spouse, she verbalizes understanding of above.       Kayy Parra, [facility],Nurse Liaison  [email or web address]  My Cell:  [phone] Mon=-Fri  [facility] OFFICE  24 hrs  [phone]

## 2023-11-17 NOTE — OR NURSING
Patient declines telemetry, pulse oxymetry, blood pressure monitoring.  Education provided.  Patient still declines.  Patient to be transferred by EMS shortly.

## 2023-11-17 NOTE — CONSULTS
"SPIRITUAL HEALTH SERVICES - Consult Note  [facility] WB 8A  Referral Source/Reason for Visit: routine consult; discussion in rounds    Summary and Recommendations -  Parker in good spirits today; expressed relief and gratitude when provider shared updates re new timing and logistics of procedure.  Crafts and cooking delicious food are an important creative outlet. In particular, being able to try food is an important part of Parker's quality of life, even if it causes some physical discomfort.    Plan: Spiritual Health Services remains available for patient, family, and staff support; please place consult for follow up visits.    LALA Reyez.   Associate   Pager: [phone]    * [facility] remains available 24/7 for emergent requests/referrals, either by having the switchboard page the on-call  or by entering an ASAP/STAT consult in Epic (this will also page the on-call ). Routine Epic consults receive an initial response within 24 hours.*           [facility] available 24/7 for emergent requests/referrals, either by paging the on-call  or by entering an ASAP/STAT consult in [employer], which will also page the on-call .    Assessment    Saw pt Parker HALI Acevedo per routine consult; interdisciplinary rounds. Upon initiating visit, Parker was working on some coloring and craft projects and warmly welcomed conversation.    Patient/Family Understanding of Illness and Goals of Care - Not discussed in great detail. Parker and I did discuss his love of food and cooking at length. Even though eating can cause him significant discomfort \"sometimes in minutes,\" being able to enjoy food (especially with loved ones) is a significant part of Parker's overall quality of life.    Distress and Loss - Today primary sources of anxiety per staff were challenges with logistics of different procedures that Parker needs. Parker indicated acceptance of the need for these procedures, while also naming his dislike of some " of the discomfort involved with them, particular re: procedures that involve putting a tube or camera down his throat. He joked that sedation helps with managing this discomfort.    Strengths, Coping, and Resources - Relationship with wife Rose; also caretaking role he plays two [employer] are important sources of connection and support. Parker enjoys partnering with Rose on different craft and decorating projects that she works on.    Meaning, Beliefs, and Spirituality - Not discussed.

## 2023-11-17 NOTE — ANESTHESIA PROCEDURE NOTES
Airway       Patient location during procedure: OR       Procedure Start/Stop Times: 11/17/2023 1:36 PM  Staff -        CRNA: Venita Hirsch APRN CRNA       Performed By: CRNA  Consent for Airway        Urgency: elective  Indications and Patient Condition       Indications for airway management: marisol-procedural       Induction type:intravenous       Mask difficulty assessment: 1 - vent by mask    Final Airway Details       Final airway type: endotracheal airway       Successful airway: ETT - single  Endotracheal Airway Details        ETT size (mm): 7.5       Cuffed: yes       Successful intubation technique: direct laryngoscopy       DL Blade Type: Hassan 2       Grade View of Cords: 1       Adjucts: stylet       Position: Right       Measured from: lips       Secured at (cm): 22       Bite block used: Soft    Post intubation assessment        Placement verified by: capnometry, equal breath sounds and chest rise        Number of attempts at approach: 1       Secured with: pink tape       Ease of procedure: easy       Dentition: Unchanged and Intact    Medication(s) Administered   Medication Administration Time: 11/17/2023 1:36 PM

## 2023-11-17 NOTE — PROGRESS NOTES
Brief infectious disease note:    Patient off the unit for majority of the day to get tunneled line placement and then for transesophageal echocardiogram.    Provided RONEL does not show signs of endocarditis then would carry out plan as described in the last infectious disease note for 14 days of ceftriaxone with end date 11/28/2023.    If the patient does have signs of endocarditis then would need further evaluation and management of this more serious condition.    No objection to discharge of the patient, does not need specific ID follow-up provided RONEL is negative.    Call anytime if I can be of further assistance, ID will sign off.    Addendum: Reviewed RONEL results, negative for vegetations-will treat for 14 days as planned for line infection.    Signed:  Uday Mitchell MD, 11/17/2023 Staff Physician, General Infectious Disease  Page Me @ [phone] or Secure Message me via [employer]   For after hours coverage see [employer] ->INFECTIOUS DISEASE MEDICINE ADULT/[facility]

## 2023-11-17 NOTE — ANESTHESIA POSTPROCEDURE EVALUATION
Patient: Parker Acevedo    Procedure: Procedure(s):  ECHOCARDIOGRAM,TRANSESOPHAGEAL,WITH ANESTHESIA       Anesthesia Type:  General    Note:  Disposition: Inpatient   Postop Pain Control: Uneventful            Sign Out: Well controlled pain   PONV: No   Neuro/Psych: Uneventful            Sign Out: Acceptable/Baseline neuro status   Airway/Respiratory: Uneventful            Sign Out: Acceptable/Baseline resp. status   CV/Hemodynamics: Uneventful            Sign Out: Acceptable CV status; No obvious hypovolemia; No obvious fluid overload   Other NRE: NONE   DID A NON-ROUTINE EVENT OCCUR? No           Last vitals:  Vitals Value Taken Time   /47 11/17/23 1430   Temp     Pulse 83 11/17/23 1430   Resp     SpO2 94% 11/17/23 1428   Vitals shown include unfiled device data.    Electronically Signed By: Leslie Goldberg, MD  November 17, 2023  2:41 PM

## 2023-11-17 NOTE — PRE-PROCEDURE
GENERAL PRE-PROCEDURE:   Procedure:  Transesophageal echocardiogram  Date/Time:  11/17/2023 1:07 PM    Verbal consent obtained?: Yes    Written consent obtained?: Yes    Risks and benefits: Risks, benefits and alternatives were discussed    Consent given by:  Patient  Patient states understanding of procedure being performed: Yes    Patient's understanding of procedure matches consent: Yes    Procedure consent matches procedure scheduled: Yes    Expected level of sedation:  Deep  Appropriately NPO:  Yes  ASA Class:  3  Mallampati  :  Grade 2- soft palate, base of uvula, tonsillar pillars, and portion of posterior pharyngeal wall visible  Lungs:  Lungs clear with good breath sounds bilaterally  Heart:  Normal heart sounds and rate  History & Physical reviewed:  History and physical reviewed and no updates needed  Statement of review:  I have reviewed the lab findings, diagnostic data, medications, and the plan for sedation

## 2023-11-17 NOTE — CONSULTS
Spoke with Jhonatan Alejandro in IR and pt is an IR consult due to difficulty in past with placement and requiring IR.

## 2023-11-17 NOTE — PROGRESS NOTES
CLINICAL NUTRITION SERVICES - BRIEF NOTE     Nutrition Prescription      Recommendations already ordered by Registered Dietitian (RD):  - Collaborate with other providers.  - plan to resume pt's usual TPN and lipids with discharge today.     Future/Additional Recommendations:  Discharging today with f/up per [facility] RD protocol.     Pt discussed in care rounds this AM.  Noted pt was able to get a central line placed.  Provider confirmed that pt would be discharging later today.     EVALUATION OF THE PROGRESS TOWARD GOALS   Diet: NPO while currently in RONEL at [place]  Nutrition Support: 2L bag of PPN (5% Dex, 4.25% AA) to provide 680 kcal, 100 g dextrose and 85 g Protein.  Pt refused IV lipids and these were discontinued.    Intake: Pt has been leaving unit frequently which requires him to be disconnected from his continuous PPN.  Not noted in the MAR over past days except today RN noted PPN was stopped @ 5am per pt request.     IVF: currently receiving LR @ 100 ml/hr while at [place] for RONEL.    NEW FINDINGS   No I&O's recorded except IVF given this afternoon.     Pt declined enema 11/15 and hasn't taken any senokot since ordered 11/15.  He declined miralax but it was given 11/16--although unclear that he accepted this.     Discussed pt's TPN with [facility] RD via Teams.  She shared that his weights on his home school are similar (low to mid 180s) to his admission weight.  She states that he did receive a 3 day supply of TPN and IVFs last week so should've had it available to use until he went to the ED and then admission.  Decided to continue pt with his usual TPN and lipid and [facility] RD will continue to follow his weights, labs and oral intake     INTERVENTIONS  Collaborate with other providers.  Plan to resume pt's usual home TPN with plan for discharge later today.     Monitoring/Evaluation  Progress toward goals will be monitored and evaluated per protocol.     Chasidy Grimm) Maritza MEREDITH, LD   6B and 8A Med/surg  (M-F) Pager: [phone]  Weekend/holiday pager: [phone]

## 2023-11-17 NOTE — PHARMACY
[facility]  Parenteral ANtibiotic Review at Departure from Acute Care Collaborative Note     Patient: Parker Acevedo  MRN: [id number]  Allergies: Bactrim [sulfamethoxazole-trimethoprim], Penicillins, Ertapenem, Doxycycline, and Vancomycin    Current Location: UR 8A  OPAT to be provided by: 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 No  Pending Cultures/Microbiological Tests: yes 11/14 and 11/16 blood cultures    Inpatient ID involved in developing OPAT plan: Yes - discharge OPAT plan has no changes from ID provider, Dr. Uday Mitchell, OPAT plan charted on 11/16/2023    Outpatient ID Follow-up: ID OPAT Clinic Referral Placed ([facility] Ph: [phone] and Fax: [phone]) - no appointment needed  Designated Provider: Dr. Uday Mitchell    Antimicrobial Regimen / Route Anticipated  Duration Start Date Stop /  Reassess Date   Ceftriaxone 2 g every 24 hours/IV At least 14 days (if RONEL abnormal, duration will be extended) 11/15/2023 11/28/2023     Laboratory Tests and Monitoring Frequency: CBC with Diff, CMP Once Weekly    Imaging/Miscellaneous Monitoring: None    ID Pharmacist Interventions: None                          Jyothi Burr, PharmD, BCIDP  Pager: [phone]

## 2023-11-17 NOTE — PROGRESS NOTES
Care Management Discharge Note    Discharge Date: 11/17/2023       Discharge Disposition:  Home w/ resumption of prior to admission home infusion and new IV abx    Discharge Services:  [facility] (chronic TPN, IV hydration, and new IV abx)    Discharge DME: No new DME anticipated    Discharge Transportation: family or friend will provide    Private pay costs discussed: Not applicable    Does the patient's insurance plan have a 3 day qualifying hospital stay waiver?  No    PAS Confirmation Code:  (NA)  Patient/family educated on Medicare website which has current facility and service quality ratings:  no (N/A)    Education Provided on the Discharge Plan: Yes  Persons Notified of Discharge Plans: pt; provider; [facility]  Patient/Family in Agreement with the Plan: yes    Handoff Referral Completed: Yes - internal    Additional Information:    8:25am - Discussed pt's case w/ provider.  Provider explained that pt is now agreeable to PICC line.  Echo today at 11:15am, ideally it will be placed before then.  PICC team is unable to guarantee it will be placed today.  If not placed, pt will not have a line, however he is intent on discharging today.  In the event pt discharged w/o a line, pt would need OP infusion for IV abx.  Provider requested this writer to check on availability of OP infusion center.  Anticipate duration of infusion is 30 min, per provider.  Inquired about dosing time, as pt receives it early AM here.  Provider stated pt is unlikely to stay until tomorrow for adjustment of dosing times, so infusion would need to be done when possible.    Updated [facility] liaison.  Liaison inquired about changing dosing time, this writer explained what provider mentioned earlier.    Discussed further w/ provider - line is not able to be placed today.  Pt will require OP infusion.  Pt may leave AMA from [facility] after his echo this AM.  Pt is not willing to wait for a line to be placed.    Called  [facility] at [phone],  transferred this writer to infusion services, spoke w/ Karo.  Informed her of need to schedule OP infusion, she looked at pt's chart and stated the therapy plan is not present.  She stated that Miri (charge RN) would likely want to talk to this writer about scheduling, stated she will have Miri call this writer when she is available.    Informed provider that therapy plan may not be entered, she clarified that d/t limitation of only having 2 therapy plans in Epic at a time, she had to add it in to a pre-existing therapy plan.  She stated if OP infusion is able to be arranged, she would sign off on pt's discharge.    Received update from provider that IR is now agreeable to placing a line (likely tunneled).  Provider to discuss w/ pt regarding if he would like to do home or outpatient infusion.    Updated I liaison to current situation, liaison acknowledged.  Orders need to be signed by 12:00pm, they also need TPN recipe sent to their fax: [phone]    10:57am - Called unit pharmacist, no answer.    Received update from provider that pt will do home infusion.    Received call from [facility] infusion, informed them that pt is opting for home infusion.    Requested provider sign orders by 12pm, provider acknowledged.    11:37am - Called unit pharmacist, requested TPN recipe be faxed.  Pharmacist was unclear on specific recipe at this time, requested I pharmacist to call her at [phone].    Informed I liaison of above request, liaison acknowledged.    Provider inquired about pt getting labs drawn, this writer requested lab orders be entered for discharge for \[facility]\"", provider acknowledged.    Checked w/ I liaison regarding TPN, she stated she has not heard of any issues w/ TPN situation - at present, it seems to be sorted out.    At this time, there are no further discernible needs requiring care coordination team involvement.   Please reach out to RNCC/SW if needs arise prior to discharge.          Care mgmt available as needed.    GERRI Berry  [facility]  Units 8M/S & 10 ICU  Pager: [phone]  Phone: [phone]

## 2023-11-17 NOTE — PROCEDURES
[facility]    Procedure: Tunneled line placement    Date/Time: 11/17/2023 11:57 AM    Performed by: Lisandro Alejandro PA-C  Authorized by: Lisandro Alejandro PA-C      UNIVERSAL PROTOCOL   Site Marked: No  Prior Images Obtained and Reviewed:  Yes  Required items: Required blood products, implants, devices and special equipment available    Patient identity confirmed:  Verbally with patient, provided demographic data, hospital-assigned identification number and arm band  NA - No sedation, light sedation, or local anesthesia  Confirmation Checklist:  Patient's identity using two indicators, relevant allergies, procedure was appropriate and matched the consent or emergent situation and correct equipment/implants were available  Time out: Immediately prior to the procedure a time out was called    Universal Protocol: the Joint Commission Universal Protocol was followed    Preparation: Patient was prepped and draped in usual sterile fashion       ANESTHESIA    Anesthesia:  Local infiltration  Local Anesthetic:  Lidocaine 1% without epinephrine  Anesthetic Total (mL):  5      SEDATION    Patient Sedated: No    Fluoroscopy Time: 1 minute(s)  See dictated procedure note for full details.  Findings: Tolerated local only well    Specimens: none    Complications: None    Condition: Stable    Plan: DL TCVC ready for immediate use      PROCEDURE  Describe Procedure: 6 Fr 27 cm double lumen tunneled PowerLine via left IJ with tip at superior atriocaval junction. Functions well, heparin locked and ready for immediate use.  Patient Tolerance:  Patient tolerated the procedure well with no immediate complications  Length of time physician/provider present for 1:1 monitoring during sedation: 0

## 2023-11-19 LAB — BACTERIA BLD CULT: NO GROWTH

## 2023-11-20 ENCOUNTER — PATIENT OUTREACH (OUTPATIENT)
Dept: CARE COORDINATION | Facility: CLINIC | Age: 53
End: 2023-11-20
Payer: COMMERCIAL

## 2023-11-20 NOTE — DISCHARGE SUMMARY
"[facility]  Hospitalist Discharge Summary      Date of Admission:  11/14/2023  Date of Discharge:  11/17/2023  5:21 PM  Discharging Provider: Aubrie Mera MD  Discharge Service: Hospitalist Service, GOLD TEAM 21    Discharge Diagnoses   See below    Clinically Significant Risk Factors     # Overweight: Estimated body mass index is 26.36 kg/m  as calculated from the following:    Height as of this encounter: 1.803 m (5' 11\").    Weight as of this encounter: 85.7 kg (189 lb).  # Severe Malnutrition: based on nutrition assessment      Follow-ups Needed After Discharge   Follow-up Appointments     Adult [facility] Follow-up and recommended labs and tests      Follow up with primary care provider, Chuy Isaac, within 7 days for   hospital follow- up.      The following labs/tests are recommended: weekly CBC with differential and   CMP while on IV antibiotics. Results should be sent to:  [facility] ID Clinic Information:  Phone: [phone]  Fax: [phone] (Attention Carlos Sorensen)  Dr. Mitchell with ID will follow up the labs       Appointments on [place] and/or [facility] (with [facility] or [facility]   provider or service). Call [phone] if you haven't heard regarding   these appointments within 7 days of discharge.          - Please ensure patient finishes treatment course for antibiotics as prescribed.     Unresulted Labs Ordered in the Past 30 Days of this Admission       Date and Time Order Name Status Description    11/16/2023 11:27 AM Blood Culture Arm, Right Preliminary     11/16/2023 11:27 AM Blood Culture Arm, Right Preliminary         These results will be followed up by hospitalist pool.     Discharge Disposition   Discharged to home  Condition at discharge: Stable    Hospital Course   Parker Acevedo is a 53 year old male with PMH of Celestino-En-Y gastric bypass in 2002 c/b need for reoperation and short gut syndrome, " severe malnutrition on chronic TPN with Cm, dysphagia, anxiety, recurrent CLABSI, and LUE non-purulent cellulitis and catheter-associated septic subclavian DVT (on Lovenox) who was admitted for treatment of Klebsiella bacteremia/CLABSI.     The following is a summary of his hospitalization by problem.      Klebsiella Bacteremia - improving  CLABSI secondary to Klebsiella  Hx recurrent CLABSI  Chronic TPN  Indwelling Cm line s/p removal on 11/14/2023 and replacement on 11/17/2023  Patient was seen in the ED on 11/11/2023 for fever. He had blood cultures collected at that time. Patient was discharged from the ED due to not wanting to wait for results. He was subsequently notified that his blood culture had returned positive for Klebsiella pneumoniae and was advised to return to the ED for further management.     The patient returned for care on 11/14/2023 and was subsequently admitted for IV antibiotics. He was started on IV ceftriaxone. ID was consulted for assistance with management. Given that this was a recurrent bacteremia with the same organism for which he received antibiotic lock therapy to line in August 2023, ID felt that his current Cm central line needed removal for line holiday as part of treatment course. The line was removed by IR on 11/14/2023.     His blood culture on 11/14/2023 AM was positive for Klebsiella, as well as catheter tip culture. Follow up blood culture on 11/14 afternoon was NGTD. Repeat blood cultures from 11/16 NGTD. ID recommended 72 hr line holiday from 1st negative culture on 11/14/2023, and felt that if there was no growth on this culture by the 72 hr christi, it would be appropriate for him to get a new central line for chronic TPN and IV antibiotic therapy.    In addition, ID recommended TTE, which was negative for vegetation, followed by RONEL (performed on 11/17/2023) which was also negative for vegetation.      On 11/17/2023, IR placed a new tunneled central line  "given negative blood culture x 72 hrs.     The patient showed clinical improvement during hospitalization. He was no longer having fevers by the time of discharge.     Discharge recommendations for treatment of infection from ID as follows:  Antibiotic Information  Name of Antibiotic Dose of Antibiotic1 Anticipated duration Effective start date2 End date   Ceftriaxone 2g  14 days  11/15/23 11/28/23   1.Dose of antibiotic will need to be renally adjusted if creatinine clearance changes  2.Effective start date is the date of adequate therapy with appropriate spectrum     Method of antibiotic delivery:Tunneled line.Is the line being used for another indication besides antimicrobials? Yes At the end of therapy should the line used for antimicrobials be removed or de-accessed? No. Selecting \"yes\" will function as written order to remove PICC line or de-access the indwelling line at the end of therapy.     Weekly labs required: CBC with diff and CMP. Dr. Mitchell will follow labs     Imaging for ID follow up: ID Imaging: No.      ID Follow up-  No ID follow up needed provided trans-esophageal echo does not show signs of endocarditis     ID provider route note: JAYE RN Care Coordinator [facility] ID Clinic Information:  Phone: [phone]  Fax: [phone] (Attention Carlos Sorensen)     Home IV antibiotic therapy was arranged through [facility]. The patient was instructed on the need for weekly lab draws for monitor while on IV antibiotics.      Short gut syndrome  TPN dependent  Severe malnutrition  J tube  Reflux  During admission, pharmacy and nutrition followed closely for management of parenteral nutrition. During line holiday 11/14-11/7, the patient received PPN. Arrangements were made for him to resume TPN at discharge given tunneled central line was replaced on 11/17.     He was otherwise continued on his PTA medications during admission and was allowed to eat regular diet as " tolerated.      Constipation  Intermittent abdominal discomfort  Patient reported intermittent abdominal discomfort with PO intake. He also reported very infrequent bowel movements, sometimes going up to a couple of weeks with no bowel movement. Therefore, AXR obtained on 11/15/2023 to evaluate for abnormalities and he was noted to have large colonic stool burden. Discussed results with patient extensively and shared concern that his symptoms could be related to constipation/large stool burden. The patient did not feel that he was constipated. Advised that he should be on an aggressive bowel regimen to help with the stool burden. He was agreeable to trying oral bowel regimen as well as self-administered suppository during admission. He states he plans to resume enemas at home, as he did not feel comfortable with getting these administered during hospitalization.     Recommend that PCP follow up on bowel habits and continue to recommend aggressive bowel regimen, especially given patient is on chronic opioid therapy.      Chronic pain  He was continued on PTA pain medication regimen with fentanyl patch, oxycodone.      Catheter-associated septic subclavian DVT  He was continued on Lovenox 80mg BID. This did NOT have to be held for tunneled line placement on 11/17/2023.      Anemia  Baseline over past couple years appears to be around 10-11, likely in setting of chronic disease. Hgb on admission at 9.6 and remained stable during admission.      Tobacco use disorder  Patient declined NRT during admission.      Anxiety: PTA lorazepam at bedtime  ADHD: PTA Adderall  Paroxysmal AFib: PTA carvedilol resumed at discharge.     Consultations This Hospital Stay   INFECTIOUS DISEASE [facility] ADULT IP CONSULT  INTERVENTIONAL RADIOLOGY ADULT/PEDS IP CONSULT  PHARMACY/NUTRITION TO START AND MANAGE TPN  PHARMACY IP CONSULT  INTERVENTIONAL RADIOLOGY ADULT/PEDS IP CONSULT  CARE MANAGEMENT / SOCIAL WORK IP CONSULT  VASCULAR ACCESS  ADULT IP CONSULT  SPIRITUAL HEALTH SERVICES IP CONSULT  OPAT PHARMACY IP CONSULT    Code Status   FULL    Time Spent on this Encounter   I, Aubrie Mera MD, personally saw the patient today and spent greater than 30 minutes discharging this patient.       Aubrie Mera MD  [facility] [address]  [facility] [address]  Phone: [phone]  Fax: [phone]  ______________________________________________________________________    Physical Exam   Vital Signs: T 98.8F HR 71 /84 RR 16 SpO2 95% on RA  Weight: 189 lbs 0 oz  Physical Exam  Constitutional:       General: He is not in acute distress.     Appearance: Normal appearance. He is not toxic-appearing.   HENT:      Head: Normocephalic and atraumatic.      Right Ear: External ear normal.      Left Ear: External ear normal.      Nose: Nose normal.      Mouth/Throat:      Mouth: Mucous membranes are moist.   Eyes:      Extraocular Movements: Extraocular movements intact.      Conjunctiva/sclera: Conjunctivae normal.   Pulmonary:      Effort: Pulmonary effort is normal. No respiratory distress.   Abdominal:      General: There is no distension.   Musculoskeletal:         General: Normal range of motion.      Cervical back: Normal range of motion.   Skin:     Findings: No rash.   Neurological:      Mental Status: He is alert. Mental status is at baseline.       Primary Care Physician   Chuy Isaac    Discharge Orders      Comprehensive metabolic panel    Please send results to Dr. Mitchell (ID) at:  [facility] ID Clinic Information:  Phone: [phone]  Fax: [phone] (Attention Carlos Sorensen)     Home Infusion Referral      OPAT enrollment and ID Clinic Referral      Primary Care - Care Coordination Referral      Activity    Your activity upon discharge: activity as tolerated     Adult [facility] Follow-up and recommended labs and tests    Follow up with primary care provider, Chuy Isaac, within 7  days for hospital follow- up.      The following labs/tests are recommended: weekly CBC with differential and CMP while on IV antibiotics. Results should be sent to:  [facility] ID Clinic Information:  Phone: [phone]  Fax: [phone] (Attention aCrlos Sorensen)  Dr. Mitchell with ID will follow up the labs       Appointments on [place] and/or [facility] (with [facility] or [facility] provider or service). Call [phone] if you haven't heard regarding these appointments within 7 days of discharge.     Reason for your hospital stay    Dear Parker Acevedo    Your were hospitalized at [facility] with bacteremia and treated with antibiotics and your tunneled line was exchanged.  Over your hospitalization your symptoms improved and today you are ready to be discharged home.  If you continue the antibiotic therapy you should continue to improve but if you develop fever, shortness of breath, light headedness, chest pain, confusion, or any other concerns, please seek medical attention.    We are suggesting the following medication changes:  - Give ceftriaxone daily through your tunneled line. You will take the antibiotics through 11/28/2023.  - Take the remainder of your medications as prescribed    Please get the following tests done:  - You will need a CBC with differential and CMP every week (11/22 and 11/29) while you are on the antibiotics for monitoring.     Please set up an appointment with:  - Your primary care doctor within 1 week of hospital discharge    It was a pleasure meeting with you today. Thank you for allowing me and my team the privilege of caring for you today. You are the reason we are here, and I truly hope we provided you with the excellent service you deserve. Please let us know if there is anything else we can do for you so that we can be sure you are leaving completely satisfied with your care experience.    Take care!  Aubrie  MD [place]  Department of Medicine  [facility]     Diet    Follow this diet upon discharge: regular diet as tolerated + TPN     CBC with Platelets & Differential    Please send results to Dr. Mitchell (ID) at:  [facility] ID Clinic Information:  Phone: [phone]  Fax: [phone] (Attention Carlos Sorensen)       Significant Results and Procedures   Most Recent 3 CBC's:  Recent Labs   Lab Test 11/17/23  0706 11/16/23  0715 11/15/23  1249   WBC 7.8 6.6 8.6   HGB 9.8* 9.3* 9.5*   MCV 85 87 85    267 271     Most Recent 3 BMP's:  Recent Labs   Lab Test 11/17/23  0706 11/17/23  0010 11/16/23  1826 11/16/23  1233 11/16/23  0715 11/15/23  1836 11/15/23  1249     --   --   --  140  --  140   POTASSIUM 3.9  --   --   --  4.0  --  3.8   CHLORIDE 105  --   --   --  107  --  107   CO2 29  --   --   --  25  --  26   BUN 12.7  --   --   --  11.3  --  13.0   CR 0.79  --   --   --  0.72  --  0.75   ANIONGAP 8  --   --   --  8  --  7   SILAS 9.2  --   --   --  8.5*  --  8.6   GLC 96 117* 113*   < > 95   < > 103*    < > = values in this interval not displayed.     Most Recent 2 LFT's:  Recent Labs   Lab Test 11/15/23  1249 11/14/23  0424   AST 14 11   ALT 10 7   ALKPHOS 64 61   BILITOTAL 0.2 0.2   ,   Results for orders placed or performed during the hospital encounter of 11/14/23   XR Chest 2 Views    Narrative    EXAM: XR CHEST 2 VIEWS  LOCATION: [facility]  DATE: 11/14/2023    INDICATION: ? pneumonia  COMPARISON: 11/11/2023.    FINDINGS: Left subclavian infusion catheter. No pneumothorax. The heart size is normal. The lungs are clear. There is no pleural effusion.      Impression    IMPRESSION: No acute abnormality.   XR Abdomen 1 View    Narrative    Examination: XR ABDOMEN 1 VIEW 11/15/2023 11:53 AM     Comparison: CT A/P 9/29/2023. Abdominal radiograph 2/24/2022.    History: Abdominal pain and abdominal distention with  oral intake.  Concern for obstruction.    Findings: Supine AP abdominal radiographs. No definite obstructive  bowel gas pattern. Large colonic stool burden. No evidence of  pneumatosis or portal venous gas. The lung bases are unremarkable.  Anastomotic sutures project over the left upper and mid abdomen.  Gastrostomy projecting over the left upper abdomen. Cholecystectomy  clips. Surgical clips in the left upper and mid abdomen.      Impression    Impression: Large colonic stool burden.    I have personally reviewed the examination and initial interpretation  and I agree with the findings.    MANUEL CALDWELL MD         SYSTEM ID:  [id number]   IR CVC Tunnel Placement > 5 Yrs of Age    Narrative    PROCEDURE: Tunneled central venous catheter placement     Procedural Personnel  Advanced practice provider(s): Jhonatan Alejandro PA-C    Procedure Date (mm/dd/yyyy): 11/17/2023    Pre-procedure diagnosis: Short gut syndrome  Post-procedure diagnosis: Same  Indication: Administration of total parenteral nutrition  Additional clinical history: Short gut syndrome requires continuous  total parenteral nutrition developed bacteremia status post tunneled  line removal 11/14. Returns for tunneled central venous catheter  replacement following 72 hour line holiday. Patient willing to proceed  without sedation to avoid new IV placement    Complications: No immediate complications.      Impression    IMPRESSION:    Insertion of left-sided tunneled PowerLine catheter, with tip in the  expected location of the cavoatrial junction.    Plan:     The catheter may be used immediately.  _______________________________________________________________    PROCEDURE SUMMARY:  - Venous access with ultrasound guidance  - Tunneled central venous catheter insertion with fluoroscopic  guidance  - Additional procedure(s): None    PROCEDURE DETAILS:    Pre-procedure  Consent: Informed consent for the procedure including risks, benefits  and alternatives was  obtained and time-out was performed prior to the  procedure.  Preparation (MIPS): The site was prepared and draped using all  elements of maximal sterile barrier technique including sterile  gloves, sterile gown, cap, mask, large sterile sheet, sterile  ultrasound probe cover, hand hygiene and cutaneous antisepsis with 2%  chlorhexidine.   Medical reason for site preparation exception (MIPS): Not applicable    Anesthesia/sedation  Level of anesthesia/sedation: No sedation  Anesthesia/sedation administered by: Not applicable  Total intra-service sedation time (minutes): N/a    Access  Local anesthesia was administered. The vessel was sonographically  evaluated and determined to be patent. Real time ultrasound was used  to visualize needle entry into the vessel and a permanent image was  stored.  Laterality: Left  Vein accessed: Internal jugular vein  Access technique: Micropuncture set with 21 gauge needle    Catheter placement  An incision was made near the venous access site and the catheter was  tunneled subcutaneously to the venous access site and trimmed to  appropriate length (27.5 cm). The catheter was advanced via a  peel-away sheath into the vein under fluoroscopic guidance. Catheter  tip location was fluoroscopically verified and a permanent image was  stored.  Catheter placed: 6 Kenyan BD Bard PowerLine  Lot number: [id number]  Catheter size (Kenyan): 6  Lumens: Dual-lumen   Power injectable: Yes  Catheter tip: cavoatrial junction  Catheter flush: Heparin (10 units/mL)    Closure  A sterile dressing was applied.  Access site closure technique: Tissue adhesive  Catheter securement technique: Non-absorbable suture and Stat-lock    Radiation Dose  Fluoroscopy time (seconds): 11.9    Reference air kerma (mGy): 1.46     Additional Details  Additional description of procedure: None  Device used: None  Equipment details: None  Unique Device Identifiers: Not available  Specimens removed: None  Estimated blood loss  (mL): Less than 10  Standardized report: [email or web address]_v3.1    DANIELLE RETANA PA-C         SYSTEM ID:  [id number]   Echo Complete     Value    LVEF  55-60%    Narrative    [id number]  [id number]  [id number]  [id number]^JENNIFER^YESIKA     [facility]  Echocardiography Laboratory  [address] [place] [address]     Name: SARA HASSAN  MRN: [id number]  : 1970  Study Date: 11/15/2023 01:42 PM  Age: 53 yrs  Gender: Male  Patient [facility]  Reason For Study: Other, Please Specify in Comments  Ordering Physician: YESIKA RODRIGUEZ  Performed By: Isi Middleton     BSA: 2.0 m2  Height: 70 in  Weight: 191 lb  ______________________________________________________________________________  Procedure  Complete Portable Echo Adult. Technically difficult study. Poor acoustic  windows.  ______________________________________________________________________________  Interpretation Summary  Technically difficult study.  Global and regional left ventricular function is normal with an EF of 55-60%.  Global right ventricular function is normal.  This study was compared with the study from 23: No significant changes  noted.  ______________________________________________________________________________  Left Ventricle  Global and regional left ventricular function is normal with an EF of 55-60%.  Left ventricular size is normal. Left ventricular wall thickness cannot  evaluate. Left ventricular diastolic function is not assessable.     Right Ventricle  The right ventricle is normal size. Global right ventricular function is  normal.     Mitral Valve  The mitral valve is normal. Trace mitral insufficiency is present.     Aortic Valve  The valve leaflets are not well visualized. On Doppler interrogation, there is  no significant stenosis or regurgitation.     Tricuspid Valve  The tricuspid valve is normal. Trace tricuspid insufficiency is present. The  peak velocity of  the tricuspid regurgitant jet is not obtainable. Pulmonary  artery systolic pressure cannot be assessed.     Pulmonic Valve  The pulmonic valve cannot be assessed.     Vessels  The aorta root cannot be assessed. Dilation of the inferior vena cava is  present with normal respiratory variation in diameter. IVC diameter and  respiratory changes fall into an intermediate range suggesting an RA pressure  of 8 mmHg.     Pericardium  No pericardial effusion is present.     Compared to Previous Study  This study was compared with the study from 23 . No significant changes  noted.  ______________________________________________________________________________  Report approved by: Sudhir Salomon 11/15/2023 03:43 PM     ______________________________________________________________________________      Echo RONEL     Value    LVEF  60-65%    Narrative    [id number]  [facility]  [id number]  [id number]^JENNIFER^YESIKA     [facility]  Echocardiography Laboratory  [address] [place] [id number]     Name: SARA HASSAN  MRN: [id number]  : 1970  Study Date: 2023 01:39 PM  Age: 53 yrs  Gender: Male  Patient Location: [facility]  Reason For Study: Other, Please Specify in Comments  Ordering Physician: YESIKA RODRIGUEZ  Performed By: Theresa Lora MD     HR: 80  BP: 115/86 mmHg  Attestation  I performed the RONEL probe placement. I personally viewed the imaging and agree  with the interpretation and report as documented by the fellow, Dr. Eduardo Villanueva.  ______________________________________________________________________________  Interpretation Summary  Normal biventricular function.  No significant valvular abnormalities present. No valvular vegetations  present.  No pericaridal effusion present.  ______________________________________________________________________________  Procedure  Transesophageal Echocardiogram with color and spectral Doppler performed.  The  procedure was performed in the Operating Room. Informed consent for  Transesophegeal echo obtained. RONEL Probe #[id number] was used during the procedure.  The Transducer was inserted without difficulty . The patient tolerated the  procedure well. Complications None. Sedation, endotracheal intubation, and  mechanical ventilation were initiated prior to the RONEL and were monitored by  anesthesia.     Left Ventricle  Left ventricular size is normal. Left ventricular wall thickness is normal.  Global and regional left ventricular function is normal with an EF of 60-65%.     Right Ventricle  The right ventricle is normal size. Global right ventricular function is  normal.     Atria  Both atria appear normal. The left atrial appendage is normal. It is free of  spontaneous echo contrast and thrombus. No left atrial mass or thrombus  visualized. The atrial septum is intact as assessed by color Doppler .     Mitral Valve  The mitral valve is normal.     Aortic Valve  Aortic valve is normal in structure and function.     Tricuspid Valve  The tricuspid valve is normal.     Pulmonic Valve  The pulmonic valve is normal.     Vessels  The aorta root is normal.     Pericardium  No pericardial effusion is present.     Compared to Previous Study  This study was compared with the study from 7/7/23 . There has been no change.     ______________________________________________________________________________  Report approved by: Sudhir Valente 11/17/2023 03:37 PM     ______________________________________________________________________________        *Note: Due to a large number of results and/or encounters for the requested time period, some results have not been displayed. A complete set of results can be found in Results Review.       Discharge Medications   Discharge Medication List as of 11/17/2023  5:08 PM        START taking these medications    Details   cefTRIAXone (ROCEPHIN) 2 GM vial Inject 2 g into the vein every 24 hours for  "11 days, No Print Out           CONTINUE these medications which have NOT CHANGED    Details   albuterol (VENTOLIN HFA) 108 (90 Base) MCG/ACT inhaler Inhale 2 puffs into the lungs every 6 hours as needed, Disp-18 g, R-1, E-PrescribePharmacy may dispense brand covered by insurance (Proair, or proventil or ventolin or generic albuterol inhaler)      ALPRAZolam (XANAX) 0.5 MG tablet Take 1 tablet (0.5 mg) by mouth 2 times daily as needed for sleep or anxiety, Disp-60 tablet, R-0, E-Prescribe      amphetamine-dextroamphetamine (ADDERALL) 20 MG tablet TAKE ONE TABLET BY MOUTH ONCE DAILY, Disp-30 tablet, R-0, E-Prescribe      carvedilol (COREG) 6.25 MG tablet TAKE 2 TABLETS (12.5 MG) BY MOUTH 2 TIMES DAILY WITH MEALS, Disp-60 tablet, R-3, E-Prescribe      cyanocobalamin (CYANOCOBALAMIN) 1000 MCG/ML injection INJECT 1 ML INTO THE MUSCLE EVERY 30 DAYS, Disp-1 mL, R-3, E-Prescribe      enoxaparin ANTICOAGULANT (LOVENOX) 80 MG/0.8ML syringe INJECT THE CONTENTS OF ONE SYRINGE (80MG) UNDER THE SKIN TWO TIMES A DAY, Disp-67.2 mL, R-0, E-Prescribe      [facility] INFUSION MANAGED PATIENT Contact [facility] for patient specific medication information at [phone] on admission and discharge from the hospital.  Phones are answered 24 hours a day 7 days a week 365 days a year.    Providers - Choose \"CONTINUE HOME MED (no scr ipt)\" at discharge if patient treatment with home infusion will continue., No Print OutResume prior to admission TPN/Lipids/saline      fentaNYL (DURAGESIC) 25 mcg/hr 72 hr patch Place 1 patch onto the skin every 48 hours Fill 11/27/23 and start 11/29/23. 30 day supply for chronic pain., Disp-15 patch, R-0, E-Prescribe      Lidocaine (LIDOCARE) 4 % Patch Place 1 patch onto the skin daily as needed for moderate pain To prevent lidocaine toxicity, patient should be patch free for 12 hrs daily.Historical      lipids plant base (CLINOLIPID) 20 % infusion Inject 250 mLs into the vein every 24 " "hours, Disp-1000 mL, R-3, E-Prescribe      naloxone (NARCAN) 4 MG/0.1ML nasal spray Spray 1 spray (4 mg) into one nostril alternating nostrils as needed for opioid reversal every 2-3 minutes until assistance arrives, Disp-0.2 mL, R-3, E-Prescribe      nystatin (MYCOSTATIN) 347124 UNIT/GM external cream Apply topically 2 times dailyDisp-30 g, [id number]      ondansetron (ZOFRAN ODT) 8 MG ODT tab DISSOLVE ONE TABLET ON TONGUE EVERY 8 HOURS AS NEEDED FOR NAUSEA, Disp-90 tablet, R-1, E-Prescribe      oxyCODONE (ROXICODONE) 5 MG/5ML solution Take 5-10 mLs (5-10 mg) by mouth every 4 hours as needed for moderate to severe pain Max of 40mg/day. Fill 11/27/23 and start 11/29/23. 30 day supply for chronic pain., Disp-1200 mL, R-0, E-Prescribe      pantoprazole (PROTONIX) 40 MG EC tablet TAKE 1 TABLET (40 MG) BY MOUTH DAILY, Disp-30 tablet, R-5, E-Prescribe      polyethylene glycol (MIRALAX) 17 GM/Dose powder Take 17 g by mouth daily, Disp-1020 g, R-3, E-Prescribe      pregabalin (LYRICA) 25 MG capsule Take 25mg at bedtime for 7 days, then 25mg twice daily for 7 days, then 25mg in the AM and 50mg at bedtime for 7 days, then take 50mg twice daily. If side effects, then reduce to last tolerable dosage., Disp-120 capsule, R-0, E-Prescribe      sucralfate (CARAFATE) 1 GM/10ML suspension TAKE 10MLS  BY MOUTH FOUR TIMES A DAY AS NEEDED, Disp-420 mL, R-1, E-Prescribe      Syringe/Needle, Disp, (B-D ECLIPSE SYRINGE) 27G X 1/2\" 1 ML MISC 1 Device every 30 days, Disp-30 each, R-1, E-Prescribe      vitamin D2 (ERGOCALCIFEROL) 84001 units (1250 mcg) capsule Take 1 capsule (50,000 Units) by mouth once a week, Disp-12 capsule, R-3, E-Prescribe           Allergies   Allergies   Allergen Reactions    Bactrim [Sulfamethoxazole-Trimethoprim] Rash    Penicillins Anaphylaxis     Please see Antimicrobial Management Team allergy assessment note 10/10/2018. Patient reported tolerating amoxicillin.  Tolerating cefepime and ceftriaxone without " reaction 6/23    Ertapenem Nausea and Vomiting    Doxycycline Rash    Vancomycin Rash     Rash after receiving vancomycin 3/28/16 (infusion reaction?). Tolerated with slower infusion and diphenhydramine premed.  Tolerated 1250mg over 90minutes 7/2023.

## 2023-11-20 NOTE — PROGRESS NOTES
Clinic Care Coordination Contact  [facility]/Voicemail    Clinical Data: Care Coordinator Outreach    Outreach Documentation Number of Outreach Attempt   11/20/2023  10:51 AM 1       Left message on patient's voicemail with call back information and requested return call.    Plan: Care Coordinator sent care coordination introduction letter on 5/20/2022 via [employer]. Care Coordinator will try to reach patient again in 1-2 business days.    Kinsey Muhammad RN Care Coordination   [facility]  Email: [email or web address]  Phone: [phone]

## 2023-11-21 ENCOUNTER — TELEPHONE (OUTPATIENT)
Dept: SURGERY | Facility: CLINIC | Age: 53
End: 2023-11-21
Payer: COMMERCIAL

## 2023-11-21 LAB
BACTERIA BLD CULT: NO GROWTH
BACTERIA BLD CULT: NO GROWTH

## 2023-11-21 ASSESSMENT — ACTIVITIES OF DAILY LIVING (ADL): DEPENDENT_IADLS:: TRANSPORTATION

## 2023-11-21 NOTE — PROGRESS NOTES
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      The following is a summary of his hospitalization by problem.      Klebsiella Bacteremia - improving  CLABSI secondary to Klebsiella  Hx recurrent CLABSI  Chronic TPN  Indwelling Cm line s/p removal on 11/14/2023 and replacement on 11/17/2023  Patient was seen in the ED on 11/11/2023 for fever. He had blood cultures collected at that time. Patient was discharged from the ED due to not wanting to wait for results. He was subsequently notified that his blood culture had returned positive for Klebsiella pneumoniae and was advised to return to the ED for further management.      The patient returned for care on 11/14/2023 and was subsequently admitted for IV antibiotics. He was started on IV ceftriaxone. ID was consulted for assistance with management. Given that this was a recurrent bacteremia with the same organism for which he received antibiotic lock therapy to line in August 2023, ID felt that his current Cm central line needed removal for line holiday as part of treatment course. The line was removed by IR on 11/14/2023.      His blood culture on 11/14/2023 AM was positive for  "Klebsiella, as well as catheter tip culture. Follow up blood culture on 11/14 afternoon was NGTD. Repeat blood cultures from 11/16 NGTD. ID recommended 72 hr line holiday from 1st negative culture on 11/14/2023, and felt that if there was no growth on this culture by the 72 hr christi, it would be appropriate for him to get a new central line for chronic TPN and IV antibiotic therapy.     In addition, ID recommended TTE, which was negative for vegetation, followed by RONEL (performed on 11/17/2023) which was also negative for vegetation.       On 11/17/2023, IR placed a new tunneled central line given negative blood culture x 72 hrs.      The patient showed clinical improvement during hospitalization. He was no longer having fevers by the time of discharge.      Discharge recommendations for treatment of infection from ID as follows:  Antibiotic Information  Name of Antibiotic Dose of Antibiotic1 Anticipated duration Effective start date2 End date   Ceftriaxone 2g  14 days  11/15/23 11/28/23   1.Dose of antibiotic will need to be renally adjusted if creatinine clearance changes  2.Effective start date is the date of adequate therapy with appropriate spectrum     Method of antibiotic delivery:Tunneled line.Is the line being used for another indication besides antimicrobials? Yes At the end of therapy should the line used for antimicrobials be removed or de-accessed? No. Selecting \"yes\" will function as written order to remove PICC line or de-access the indwelling line at the end of therapy.     Weekly labs required: CBC with diff and CMP. Dr. Mitchell will follow labs     Imaging for ID follow up: ID Imaging: No.      ID Follow up-  No ID follow up needed provided trans-esophageal echo does not show signs of endocarditis     ID provider route note: CATHERINET RN Care Coordinator [facility] ID Clinic Information:  Phone: [phone]  Fax: [phone] (Attention Carlos Sorensen)      Home " IV antibiotic therapy was arranged through [facility]. The patient was instructed on the need for weekly lab draws for monitor while on IV antibiotics.      Short gut syndrome  TPN dependent  Severe malnutrition  J tube  Reflux  During admission, pharmacy and nutrition followed closely for management of parenteral nutrition. During line holiday 11/14-11/7, the patient received PPN. Arrangements were made for him to resume TPN at discharge given tunneled central line was replaced on 11/17.      He was otherwise continued on his PTA medications during admission and was allowed to eat regular diet as tolerated.      Constipation  Intermittent abdominal discomfort  Patient reported intermittent abdominal discomfort with PO intake. He also reported very infrequent bowel movements, sometimes going up to a couple of weeks with no bowel movement. Therefore, AXR obtained on 11/15/2023 to evaluate for abnormalities and he was noted to have large colonic stool burden. Discussed results with patient extensively and shared concern that his symptoms could be related to constipation/large stool burden. The patient did not feel that he was constipated. Advised that he should be on an aggressive bowel regimen to help with the stool burden. He was agreeable to trying oral bowel regimen as well as self-administered suppository during admission. He states he plans to resume enemas at home, as he did not feel comfortable with getting these administered during hospitalization.      Recommend that PCP follow up on bowel habits and continue to recommend aggressive bowel regimen, especially given patient is on chronic opioid therapy.      Chronic pain  He was continued on PTA pain medication regimen with fentanyl patch, oxycodone.      Catheter-associated septic subclavian DVT  He was continued on Lovenox 80mg BID. This did NOT have to be held for tunneled line placement on 11/17/2023.      Anemia  Baseline over past couple years appears to be  around 10-11, likely in setting of chronic disease. Hgb on admission at 9.6 and remained stable during admission.      Tobacco use disorder  Patient declined NRT during admission.      Anxiety: PTA lorazepam at bedtime  ADHD: PTA Adderall  Paroxysmal AFib: PTA carvedilol resumed at discharge.        ASSESSMENT      Enrollment  Outreach Frequency: monthly, more frequently as needed    Discharge Assessment  How are you doing now that you are home?: per pt's wife, things have been going well. she said they think they finally got all of the infection. wife said they have decided to do his lab draws up in infusion from now on vs home infusion. wife and pt cant think of any needs at this time  How are your symptoms? (Red Flag symptoms escalate to triage hotline per guidelines): Improved  Do you feel your condition is stable enough to be safe at home until your provider visit?: Yes  Does the patient have their discharge instructions? : Yes  Does the patient have questions regarding their discharge instructions? : No  Were you started on any new medications or were there changes to any of your previous medications? : Yes  Does the patient have all of their medications?: Yes  Do you have questions regarding any of your medications? : No  Do you have all of your needed medical supplies or equipment (DME)?  (i.e. oxygen tank, CPAP, cane, etc.): Yes  Discharge follow-up appointment scheduled within 14 calendar days? : No  Is patient agreeable to assistance with scheduling? : No         Post-op (Clinicians Only)  Eating & Drinking: eating and drinking without complaints/concerns  PO Intake: regular diet  Bowel Function: normal  Urinary Status: voiding without complaint/concerns      PLAN                                                      Outpatient Plan:      Discharge Orders          Comprehensive metabolic panel     Please send results to Dr. Mitchell (ID) at:  [facility]  Information:  Phone: [phone]  Fax: [phone] (Alondra Sorensen)          Home Infusion Referral       OPAT enrollment and ID Clinic Referral       Primary Care - Care Coordination Referral       Activity     Your activity upon discharge: activity as tolerated          Adult [facility] Follow-up and recommended labs and tests     Follow up with primary care provider, Chuy Isaac, within 7 days for hospital follow- up.       The following labs/tests are recommended: weekly CBC with differential and CMP while on IV antibiotics. Results should be sent to:  [facility] Clinic Information:  Phone: [phone]  Fax: [phone] (Alondra Sorensen)  Dr. Mitchell with ID will follow up the labs         Appointments on [place] and/or [facility] (with [facility] or [facility] provider or service). Call [phone] if you haven't heard regarding these appointments within 7 days of discharge.          Reason for your hospital stay     Dear Parker Acevedo     Your were hospitalized at [facility] with bacteremia and treated with antibiotics and your tunneled line was exchanged.  Over your hospitalization your symptoms improved and today you are ready to be discharged home.  If you continue the antibiotic therapy you should continue to improve but if you develop fever, shortness of breath, light headedness, chest pain, confusion, or any other concerns, please seek medical attention.     We are suggesting the following medication changes:  - Give ceftriaxone daily through your tunneled line. You will take the antibiotics through 11/28/2023.  - Take the remainder of your medications as prescribed     Please get the following tests done:  - You will need a CBC with differential and CMP every week (11/22 and 11/29) while you are on the antibiotics for monitoring.      Please set up an appointment with:  - Your primary care doctor within 1 week of  hospital discharge     It was a pleasure meeting with you today. Thank you for allowing me and my team the privilege of caring for you today. You are the reason we are here, and I truly hope we provided you with the excellent service you deserve. Please let us know if there is anything else we can do for you so that we can be sure you are leaving completely satisfied with your care experience.     Take care!  Aubrie Mera MD  Department of Medicine  [facility]          Diet     Follow this diet upon discharge: regular diet as tolerated + TPN          CBC with Platelets & Differential     Please send results to Dr. Mitchell (ID) at:  [facility] ID Clinic Information:  Phone: [phone]  Fax: [phone] (Attention Carlos Sorensen)       Future Appointments   Date Time Provider Department Center   11/24/2023  3:00 PM PH INFUSION CHAIR [address] [facility]         For any urgent concerns, please contact our 24 hour nurse triage line: [phone])         Kinsey Muhammad RN

## 2023-11-21 NOTE — TELEPHONE ENCOUNTER
General Call      Reason for Call:  Please call care nurse Tasneem as they need orders to resume care: skilled nursing 1wk x 9.    Tasneem: [phone] opt 2

## 2023-11-22 ENCOUNTER — TELEPHONE (OUTPATIENT)
Dept: SURGERY | Facility: CLINIC | Age: 53
End: 2023-11-22
Payer: COMMERCIAL

## 2023-11-22 NOTE — TELEPHONE ENCOUNTER
General Call        What are your questions or concerns:  Berkley from [facility] would like a call back regarding orders to resume care: skilled nursing 1wk     [phone], Berkley

## 2023-11-24 ENCOUNTER — LAB (OUTPATIENT)
Dept: INFUSION THERAPY | Facility: CLINIC | Age: 53
End: 2023-11-24
Attending: INTERNAL MEDICINE
Payer: COMMERCIAL

## 2023-11-24 DIAGNOSIS — D50.8 IRON DEFICIENCY ANEMIA SECONDARY TO INADEQUATE DIETARY IRON INTAKE: Primary | ICD-10-CM

## 2023-11-24 DIAGNOSIS — T82.7XXA BACTEREMIA ASSOCIATED WITH INTRAVASCULAR LINE, INITIAL ENCOUNTER (H): ICD-10-CM

## 2023-11-24 DIAGNOSIS — E61.1 IRON DEFICIENCY: ICD-10-CM

## 2023-11-24 DIAGNOSIS — E86.0 DEHYDRATION: ICD-10-CM

## 2023-11-24 DIAGNOSIS — E46 MALNUTRITION, UNSPECIFIED TYPE (H): ICD-10-CM

## 2023-11-24 DIAGNOSIS — R78.81 BACTEREMIA ASSOCIATED WITH INTRAVASCULAR LINE, INITIAL ENCOUNTER (H): ICD-10-CM

## 2023-11-24 LAB
ALBUMIN SERPL BCG-MCNC: 3.5 G/DL (ref 3.5–5.2)
ALP SERPL-CCNC: 65 U/L (ref 40–150)
ALT SERPL W P-5'-P-CCNC: 10 U/L (ref 0–70)
ANION GAP SERPL CALCULATED.3IONS-SCNC: 6 MMOL/L (ref 7–15)
AST SERPL W P-5'-P-CCNC: 19 U/L (ref 0–45)
BASOPHILS # BLD AUTO: 0.1 10E3/UL (ref 0–0.2)
BASOPHILS NFR BLD AUTO: 1 %
BILIRUB SERPL-MCNC: 0.2 MG/DL
BUN SERPL-MCNC: 10.1 MG/DL (ref 6–20)
CALCIUM SERPL-MCNC: 8.5 MG/DL (ref 8.6–10)
CHLORIDE SERPL-SCNC: 108 MMOL/L (ref 98–107)
CREAT SERPL-MCNC: 0.72 MG/DL (ref 0.67–1.17)
DEPRECATED HCO3 PLAS-SCNC: 27 MMOL/L (ref 22–29)
EGFRCR SERPLBLD CKD-EPI 2021: >90 ML/MIN/1.73M2
EOSINOPHIL # BLD AUTO: 0.1 10E3/UL (ref 0–0.7)
EOSINOPHIL NFR BLD AUTO: 1 %
ERYTHROCYTE [DISTWIDTH] IN BLOOD BY AUTOMATED COUNT: 18.5 % (ref 10–15)
GLUCOSE SERPL-MCNC: 86 MG/DL (ref 70–99)
HCT VFR BLD AUTO: 32.4 % (ref 40–53)
HGB BLD-MCNC: 9.8 G/DL (ref 13.3–17.7)
IMM GRANULOCYTES # BLD: 0 10E3/UL
IMM GRANULOCYTES NFR BLD: 0 %
LYMPHOCYTES # BLD AUTO: 2 10E3/UL (ref 0.8–5.3)
LYMPHOCYTES NFR BLD AUTO: 26 %
MCH RBC QN AUTO: 25.1 PG (ref 26.5–33)
MCHC RBC AUTO-ENTMCNC: 30.2 G/DL (ref 31.5–36.5)
MCV RBC AUTO: 83 FL (ref 78–100)
MONOCYTES # BLD AUTO: 0.7 10E3/UL (ref 0–1.3)
MONOCYTES NFR BLD AUTO: 9 %
NEUTROPHILS # BLD AUTO: 4.8 10E3/UL (ref 1.6–8.3)
NEUTROPHILS NFR BLD AUTO: 63 %
NRBC # BLD AUTO: 0 10E3/UL
NRBC BLD AUTO-RTO: 0 /100
PLATELET # BLD AUTO: 290 10E3/UL (ref 150–450)
POTASSIUM SERPL-SCNC: 4.3 MMOL/L (ref 3.4–5.3)
PROT SERPL-MCNC: 6.4 G/DL (ref 6.4–8.3)
RBC # BLD AUTO: 3.91 10E6/UL (ref 4.4–5.9)
SODIUM SERPL-SCNC: 141 MMOL/L (ref 135–145)
WBC # BLD AUTO: 7.6 10E3/UL (ref 4–11)

## 2023-11-24 PROCEDURE — 36592 COLLECT BLOOD FROM PICC: CPT

## 2023-11-24 PROCEDURE — 80053 COMPREHEN METABOLIC PANEL: CPT

## 2023-11-24 PROCEDURE — 85025 COMPLETE CBC W/AUTO DIFF WBC: CPT

## 2023-11-24 PROCEDURE — 250N000011 HC RX IP 250 OP 636: Mod: JZ | Performed by: INTERNAL MEDICINE

## 2023-11-24 RX ORDER — HEPARIN SODIUM,PORCINE 10 UNIT/ML
5-20 VIAL (ML) INTRAVENOUS DAILY PRN
Status: CANCELLED | OUTPATIENT
Start: 2023-11-24

## 2023-11-24 RX ORDER — HEPARIN SODIUM,PORCINE 10 UNIT/ML
5-20 VIAL (ML) INTRAVENOUS DAILY PRN
Status: DISCONTINUED | OUTPATIENT
Start: 2023-11-24 | End: 2023-11-24 | Stop reason: HOSPADM

## 2023-11-24 RX ADMIN — Medication 5 ML: at 14:47

## 2023-11-24 NOTE — PROGRESS NOTES
Infusion Nursing Note:  Parker Acevedo presents today for Cm lab collection.    Patient seen by provider today: No   present during visit today: Not Applicable.    Note: Good blood return, labs collected from .  Flushed with 20cc saline and 5cc heparin.      Intravenous Access:  Cm.    Treatment Conditions:  Lab will send results to Dr Mitchell.      Post Infusion Assessment:  Site patent and intact, free from redness, edema or discomfort.  No evidence of extravasations.       Discharge Plan:   Discharge instructions reviewed with: Patient.  Patient and/or family verbalized understanding of discharge instructions and all questions answered.  Patient discharged in stable condition accompanied by: wife.  Departure Mode: Ambulatory.      Gay Rey RN

## 2023-11-26 NOTE — RESULT ENCOUNTER NOTE
Results of antibiotic monitoring labs reviewed and overall unremarkable. Forwarding results to PCP and inpatient ID provider for review.

## 2023-11-30 ENCOUNTER — TRANSFERRED RECORDS (OUTPATIENT)
Dept: PALLIATIVE MEDICINE | Facility: CLINIC | Age: 53
End: 2023-11-30

## 2023-11-30 ENCOUNTER — LAB (OUTPATIENT)
Dept: INFUSION THERAPY | Facility: CLINIC | Age: 53
End: 2023-11-30

## 2023-11-30 DIAGNOSIS — R78.81 BACTEREMIA ASSOCIATED WITH INTRAVASCULAR LINE, INITIAL ENCOUNTER (H): ICD-10-CM

## 2023-11-30 DIAGNOSIS — E86.0 DEHYDRATION: ICD-10-CM

## 2023-11-30 DIAGNOSIS — D50.8 IRON DEFICIENCY ANEMIA SECONDARY TO INADEQUATE DIETARY IRON INTAKE: Primary | ICD-10-CM

## 2023-11-30 DIAGNOSIS — E61.1 IRON DEFICIENCY: ICD-10-CM

## 2023-11-30 DIAGNOSIS — E46 MALNUTRITION, UNSPECIFIED TYPE (H): ICD-10-CM

## 2023-11-30 DIAGNOSIS — T82.7XXA BACTEREMIA ASSOCIATED WITH INTRAVASCULAR LINE, INITIAL ENCOUNTER (H): ICD-10-CM

## 2023-11-30 LAB
ALBUMIN SERPL BCG-MCNC: 3.7 G/DL (ref 3.5–5.2)
ALP SERPL-CCNC: 66 U/L (ref 40–150)
ALT SERPL W P-5'-P-CCNC: 16 U/L (ref 0–70)
ANION GAP SERPL CALCULATED.3IONS-SCNC: 8 MMOL/L (ref 7–15)
AST SERPL W P-5'-P-CCNC: 17 U/L (ref 0–45)
BASOPHILS # BLD AUTO: 0.1 10E3/UL (ref 0–0.2)
BASOPHILS NFR BLD AUTO: 1 %
BILIRUB DIRECT SERPL-MCNC: <0.2 MG/DL (ref 0–0.3)
BILIRUB SERPL-MCNC: 0.2 MG/DL
BUN SERPL-MCNC: 12.4 MG/DL (ref 6–20)
CALCIUM SERPL-MCNC: 8.4 MG/DL (ref 8.6–10)
CHLORIDE SERPL-SCNC: 104 MMOL/L (ref 98–107)
CREAT SERPL-MCNC: 0.67 MG/DL (ref 0.67–1.17)
DEPRECATED HCO3 PLAS-SCNC: 28 MMOL/L (ref 22–29)
EGFRCR SERPLBLD CKD-EPI 2021: >90 ML/MIN/1.73M2
EOSINOPHIL # BLD AUTO: 0.2 10E3/UL (ref 0–0.7)
EOSINOPHIL NFR BLD AUTO: 3 %
ERYTHROCYTE [DISTWIDTH] IN BLOOD BY AUTOMATED COUNT: 18.4 % (ref 10–15)
FASTING STATUS PATIENT QL REPORTED: NO
GLUCOSE SERPL-MCNC: 100 MG/DL (ref 70–99)
HCT VFR BLD AUTO: 30 % (ref 40–53)
HGB BLD-MCNC: 9 G/DL (ref 13.3–17.7)
IMM GRANULOCYTES # BLD: 0 10E3/UL
IMM GRANULOCYTES NFR BLD: 0 %
LYMPHOCYTES # BLD AUTO: 2.3 10E3/UL (ref 0.8–5.3)
LYMPHOCYTES NFR BLD AUTO: 38 %
MAGNESIUM SERPL-MCNC: 2.1 MG/DL (ref 1.7–2.3)
MCH RBC QN AUTO: 24.5 PG (ref 26.5–33)
MCHC RBC AUTO-ENTMCNC: 30 G/DL (ref 31.5–36.5)
MCV RBC AUTO: 82 FL (ref 78–100)
MONOCYTES # BLD AUTO: 0.6 10E3/UL (ref 0–1.3)
MONOCYTES NFR BLD AUTO: 10 %
NEUTROPHILS # BLD AUTO: 2.9 10E3/UL (ref 1.6–8.3)
NEUTROPHILS NFR BLD AUTO: 48 %
NRBC # BLD AUTO: 0 10E3/UL
NRBC BLD AUTO-RTO: 0 /100
PHOSPHATE SERPL-MCNC: 3.9 MG/DL (ref 2.5–4.5)
PLATELET # BLD AUTO: 251 10E3/UL (ref 150–450)
POTASSIUM SERPL-SCNC: 4.3 MMOL/L (ref 3.4–5.3)
PROT SERPL-MCNC: 6.6 G/DL (ref 6.4–8.3)
RBC # BLD AUTO: 3.67 10E6/UL (ref 4.4–5.9)
SODIUM SERPL-SCNC: 140 MMOL/L (ref 135–145)
TRIGL SERPL-MCNC: 108 MG/DL
WBC # BLD AUTO: 6 10E3/UL (ref 4–11)

## 2023-11-30 PROCEDURE — 82248 BILIRUBIN DIRECT: CPT | Performed by: SURGERY

## 2023-11-30 PROCEDURE — 36592 COLLECT BLOOD FROM PICC: CPT

## 2023-11-30 PROCEDURE — 84100 ASSAY OF PHOSPHORUS: CPT | Performed by: SURGERY

## 2023-11-30 PROCEDURE — 83735 ASSAY OF MAGNESIUM: CPT | Performed by: SURGERY

## 2023-11-30 PROCEDURE — 84478 ASSAY OF TRIGLYCERIDES: CPT | Performed by: SURGERY

## 2023-11-30 PROCEDURE — 80053 COMPREHEN METABOLIC PANEL: CPT | Performed by: INTERNAL MEDICINE

## 2023-11-30 PROCEDURE — 85004 AUTOMATED DIFF WBC COUNT: CPT | Performed by: INTERNAL MEDICINE

## 2023-11-30 PROCEDURE — 36415 COLL VENOUS BLD VENIPUNCTURE: CPT | Performed by: SURGERY

## 2023-11-30 PROCEDURE — 250N000011 HC RX IP 250 OP 636: Mod: JZ | Performed by: INTERNAL MEDICINE

## 2023-11-30 RX ORDER — HEPARIN SODIUM,PORCINE 10 UNIT/ML
5-20 VIAL (ML) INTRAVENOUS DAILY PRN
Status: CANCELLED | OUTPATIENT
Start: 2023-11-30

## 2023-11-30 RX ORDER — HEPARIN SODIUM,PORCINE 10 UNIT/ML
5-20 VIAL (ML) INTRAVENOUS DAILY PRN
Status: DISCONTINUED | OUTPATIENT
Start: 2023-11-30 | End: 2023-11-30 | Stop reason: HOSPADM

## 2023-11-30 RX ADMIN — Medication 5 ML: at 15:39

## 2023-11-30 NOTE — PROGRESS NOTES
Infusion Nursing Note:  Parker Acevedo presents today for labs drawn from [facility].    Patient seen by provider today: No   present during visit today: Not Applicable.    Note: Good blood return, labs collected from .  Flushed with 20cc saline and 5cc heparin.       Intravenous Access:  Labs drawn without difficulty.  [facility].    Treatment Conditions:  Not Applicable.      Post Infusion Assessment:  Blood return noted pre and post infusion.  Site patent and intact, free from redness, edema or discomfort.  No evidence of extravasations.       Discharge Plan:   AVS to patient via MYCHART.  Patient discharged in stable condition accompanied by: self and friend.      Xena Kerns RN

## 2023-12-01 ENCOUNTER — TELEPHONE (OUTPATIENT)
Dept: SURGERY | Facility: CLINIC | Age: 53
End: 2023-12-01

## 2023-12-01 NOTE — TELEPHONE ENCOUNTER
Parker Santana had a ferritin checked in November, which is low.     Last iron infusion recorded = Feb 2022, Iron dextran while in the hospital.    Pt has previously had: Feraheme and Injectafer.  Ferahem was given in the home Jan 2021.      Follow up regarding iron status was delayed while patient was being treated for infection.     IV abx therapy has been completed at this time.    Would you like any further iron testing or iron replacement at this time?    Thank you!    Jyothi Peraza, PharmD [facility]   Ph: [phone]  Fax: [phone]

## 2023-12-07 DIAGNOSIS — G89.29 CHRONIC ABDOMINAL PAIN: ICD-10-CM

## 2023-12-07 DIAGNOSIS — F11.90 CHRONIC, CONTINUOUS USE OF OPIOIDS: ICD-10-CM

## 2023-12-07 DIAGNOSIS — F41.9 CHRONIC ANXIETY: ICD-10-CM

## 2023-12-07 DIAGNOSIS — R10.9 CHRONIC ABDOMINAL PAIN: ICD-10-CM

## 2023-12-07 DIAGNOSIS — R19.8 VISCERAL HYPERALGESIA: ICD-10-CM

## 2023-12-07 DIAGNOSIS — F90.0 ADHD (ATTENTION DEFICIT HYPERACTIVITY DISORDER), INATTENTIVE TYPE: ICD-10-CM

## 2023-12-07 DIAGNOSIS — R11.0 NAUSEA: ICD-10-CM

## 2023-12-07 DIAGNOSIS — G89.4 CHRONIC PAIN SYNDROME: ICD-10-CM

## 2023-12-07 DIAGNOSIS — G89.29 CHRONIC BILATERAL LOW BACK PAIN WITHOUT SCIATICA: ICD-10-CM

## 2023-12-07 DIAGNOSIS — M54.50 CHRONIC BILATERAL LOW BACK PAIN WITHOUT SCIATICA: ICD-10-CM

## 2023-12-07 NOTE — TELEPHONE ENCOUNTER
Received call from patient requesting refill(s) of fentaNYL (DURAGESIC) 25 mcg/hr 72 hr patch   And  oxyCODONE (ROXICODONE) 5 MG/5ML solution     Last dispensed from pharmacy on 11/27/23 for both    Patient's last office/virtual visit by prescribing provider on 11/01/23  Next office/virtual appointment scheduled for 02/05/24    Last urine drug screen date 11/01/23  Current opioid agreement on file (completed within the last year) Yes Date of opioid agreement: 11/01/23    E-prescribe to pharmacy-  [facility] [place] [address]     Will route to nursing pool for review and preparation of prescription(s).

## 2023-12-07 NOTE — TELEPHONE ENCOUNTER
Medication refill information reviewed.     Due date for fentaNYL (DURAGESIC) 25 mcg/hr 72 hr patch And oxyCODONE (ROXICODONE) 5 MG/5ML solution is 12/29/23     Prescriptions prepped for review.     Will route to provider.

## 2023-12-08 RX ORDER — FENTANYL 25 UG/1
1 PATCH TRANSDERMAL
Qty: 15 PATCH | Refills: 0 | Status: SHIPPED | OUTPATIENT
Start: 2023-12-08 | End: 2024-01-16

## 2023-12-08 RX ORDER — OXYCODONE HCL 5 MG/5 ML
5-10 SOLUTION, ORAL ORAL EVERY 4 HOURS PRN
Qty: 1200 ML | Refills: 0 | Status: SHIPPED | OUTPATIENT
Start: 2023-12-08 | End: 2024-01-16

## 2023-12-08 NOTE — TELEPHONE ENCOUNTER
Signed Prescriptions:                        Disp   Refills    fentaNYL (DURAGESIC) 25 mcg/hr 72 hr patch 15 pat*0        Sig: Place 1 patch onto the skin every 48 hours Fill           12/27/23 and start 12/29/23. 30 day supply for           chronic pain.  Authorizing Provider: NATALIE MCDOWELL    oxyCODONE (ROXICODONE) 5 MG/5ML solution   1200 mL0        Sig: Take 5-10 mLs (5-10 mg) by mouth every 4 hours as           needed for moderate to severe pain Max of           40mg/day. Fill 12/27/23 and start 12/29/23. 30           day supply for chronic pain.  Authorizing Provider: NATALIE MCDOWELL        Reviewed [place]  December 8, 2023- no concerning fills.    Natalie Mcdowell APRN, RN CNP, FNP  [facility]  [facility]

## 2023-12-09 RX ORDER — ONDANSETRON 8 MG/1
TABLET, ORALLY DISINTEGRATING ORAL
Qty: 90 TABLET | Refills: 1 | Status: SHIPPED | OUTPATIENT
Start: 2023-12-09 | End: 2024-01-16

## 2023-12-09 RX ORDER — ALPRAZOLAM 0.5 MG
TABLET ORAL
Qty: 60 TABLET | Refills: 0 | Status: SHIPPED | OUTPATIENT
Start: 2023-12-09 | End: 2024-01-05

## 2023-12-09 RX ORDER — DEXTROAMPHETAMINE SACCHARATE, AMPHETAMINE ASPARTATE, DEXTROAMPHETAMINE SULFATE AND AMPHETAMINE SULFATE 5; 5; 5; 5 MG/1; MG/1; MG/1; MG/1
TABLET ORAL
Qty: 30 TABLET | Refills: 0 | Status: SHIPPED | OUTPATIENT
Start: 2023-12-09 | End: 2024-01-05

## 2023-12-13 DIAGNOSIS — I82.90 VTE (VENOUS THROMBOEMBOLISM): ICD-10-CM

## 2023-12-13 RX ORDER — ENOXAPARIN SODIUM 100 MG/ML
INJECTION SUBCUTANEOUS
Qty: 67.2 ML | Refills: 0 | Status: SHIPPED | OUTPATIENT
Start: 2023-12-13 | End: 2024-06-05

## 2023-12-14 ENCOUNTER — CARE COORDINATION (OUTPATIENT)
Dept: ENDOCRINOLOGY | Facility: CLINIC | Age: 53
End: 2023-12-14
Payer: COMMERCIAL

## 2023-12-14 DIAGNOSIS — R13.10 DYSPHAGIA, UNSPECIFIED TYPE: ICD-10-CM

## 2023-12-14 DIAGNOSIS — D50.8 OTHER IRON DEFICIENCY ANEMIA: Primary | ICD-10-CM

## 2023-12-14 DIAGNOSIS — E61.1 LOW SERUM IRON: ICD-10-CM

## 2023-12-14 RX ORDER — ALBUTEROL SULFATE 90 UG/1
1-2 AEROSOL, METERED RESPIRATORY (INHALATION)
Status: CANCELLED
Start: 2023-12-14

## 2023-12-14 RX ORDER — MEPERIDINE HYDROCHLORIDE 25 MG/ML
25 INJECTION INTRAMUSCULAR; INTRAVENOUS; SUBCUTANEOUS EVERY 30 MIN PRN
Status: CANCELLED | OUTPATIENT
Start: 2023-12-14

## 2023-12-14 RX ORDER — DIPHENHYDRAMINE HYDROCHLORIDE 50 MG/ML
50 INJECTION INTRAMUSCULAR; INTRAVENOUS
Status: CANCELLED
Start: 2023-12-14

## 2023-12-14 RX ORDER — METHYLPREDNISOLONE SODIUM SUCCINATE 125 MG/2ML
125 INJECTION, POWDER, LYOPHILIZED, FOR SOLUTION INTRAMUSCULAR; INTRAVENOUS
Status: CANCELLED
Start: 2023-12-14

## 2023-12-14 RX ORDER — EPINEPHRINE 1 MG/ML
0.3 INJECTION, SOLUTION, CONCENTRATE INTRAVENOUS EVERY 5 MIN PRN
Status: CANCELLED | OUTPATIENT
Start: 2023-12-14

## 2023-12-14 RX ORDER — ALBUTEROL SULFATE 0.83 MG/ML
2.5 SOLUTION RESPIRATORY (INHALATION)
Status: CANCELLED | OUTPATIENT
Start: 2023-12-14

## 2023-12-14 RX ORDER — HEPARIN SODIUM (PORCINE) LOCK FLUSH IV SOLN 100 UNIT/ML 100 UNIT/ML
5 SOLUTION INTRAVENOUS
Status: CANCELLED | OUTPATIENT
Start: 2023-12-14

## 2023-12-14 RX ORDER — HEPARIN SODIUM,PORCINE 10 UNIT/ML
5-20 VIAL (ML) INTRAVENOUS DAILY PRN
Status: CANCELLED | OUTPATIENT
Start: 2023-12-14

## 2023-12-14 NOTE — PROGRESS NOTES
Patient notified iron infusion ordered and he should call and schedule at [facility] infusion tomorrow. Verbalized understanding

## 2023-12-19 NOTE — PROGRESS NOTES
This is a recent snapshot of the patient's [facility] medical record.  For current drug dose and complete information and questions, call [phone] or In [facility] pool, fv home infusion ([id number])  CSN Number:  [id number]      
Patient/Caregiver provided printed discharge information.

## 2023-12-28 ENCOUNTER — PATIENT OUTREACH (OUTPATIENT)
Dept: CARE COORDINATION | Facility: CLINIC | Age: 53
End: 2023-12-28
Payer: COMMERCIAL

## 2023-12-28 NOTE — LETTER
[facility]  Patient Centered Plan of Care  About Me:        Patient Name:  Parker Acevedo    YOB: 1970  Age:         53 year old   [facility] MRN:    [id number] Telephone Information:  Home Phone [phone]   Mobile [phone]       Address:  [address] [place] [address] Email address:  [email or web address]      Emergency Contact(s)    Name Relationship Lgl Grd Work Phone Home Phone Mobile Phone   1. NAHUM RAMOS* Spouse No  [phone]   2. JEYSON CAIN Sister-in-Law No  [phone]   3. MARELY ACEVEDO* Mother No  [phone]            Primary language:  English     needed? No   [facility]:  [phone] op. 1  Other communication barriers:Glasses; Physical impairment    Preferred Method of Communication:  Ellyhart  Current living arrangement: I live in a private home with spouse    Mobility Status/ Medical Equipment: Independent        Health Maintenance  Health Maintenance Reviewed: Due/Overdue   Health Maintenance Due   Topic Date Due    HEPATITIS B IMMUNIZATION (1 of 3 - 3-dose series) Never done    ZOSTER IMMUNIZATION (1 of 2) Never done    MEDICARE ANNUAL WELLNESS VISIT  06/21/2017    COVID-19 Vaccine (3 - Moderna risk series) 09/06/2021    LUNG CANCER SCREENING  02/26/2023    INFLUENZA VACCINE (1) 09/01/2023    ADVANCE CARE PLANNING  10/11/2023    LIPID  01/10/2024           My Access Plan  Medical Emergency 911   Primary Clinic Line [facility]* - [phone]   24 Hour Appointment Line [phone] or  [phone]) (toll-free)   24 Hour Nurse Line [phone] (toll-free)   Preferred Urgent Care Other     Preferred Hospital [facility]  [phone]     Preferred Pharmacy [facility] - [address]     Behavioral Health Crisis Line The National Suicide Prevention Lifeline at 1-893.102.2344 or  Text/Call 988           My Care Team Members  Patient Care Team         Relationship Specialty Notifications Start End    Chuy Isaac MD PCP - General Internal Medicine  7/3/23     Phone: [phone] Fax: [phone]         [address] [place] [address]    Patti Milligan MD (Inactive) MD Pain Clinic  6/2/15     Phone: [phone] Fax: [phone]         [address] [facility] [address] [address]    Chuy Isaac MD Assigned PCP   11/11/18     Phone: [phone] Fax: [phone]         [address] [place] [address]    Marlyn Jenkins MD MD Ophthalmology  1/29/19     Phone: [phone] Fax: [phone]         [address] [facility] [address] [facility] [address]    Marlyn Jenkins MD MD Ophthalmology  2/11/19     Phone: [phone] Fax: [phone]         [address] [address]    Kinsey Muhammad RN Lead Care Coordinator  Admissions 5/20/22     Natalie Hull APRN CNP Assigned Neuroscience Provider   6/11/22     Phone: [phone] Fax: [phone] [address] [place] [address]    Beatriz Hancock, RN Registered Nurse   7/16/22     Eduardo Butler PA-C Assigned Musculoskeletal Provider   10/1/22     Phone: [phone] Fax: [phone]         [address] [facility] [address]    Sandra Serrano MD Assigned Infectious Disease Provider   12/31/22     Phone: [phone] Fax: [phone]         [address] [facility] [address]    Natalie Hull APRN CNP Assigned Pain Medication Provider   1/9/23     Phone: [phone] Fax: [phone] [address] [place] [address]    Eduardo Maynard MD MD Cardiovascular Disease  1/31/23     Phone: [phone] Fax: [phone]         [address] [facility] [address]    Arabella Vines MD MD Cardiovascular Disease  5/17/23     Phone: [phone] Pager: [phone] Fax: [phone] [address] [facility]  [facility] [address] [facility] [address]    Francis Vyas MD Surgeon Surgery  6/21/23     Phone: [phone] Fax: [phone]         [address] [address]    Eduardo Maynard MD MD Cardiovascular Disease  7/24/23     Phone: [phone] Fax: [phone]         [address] [place] [address]    Eduardo Maynard MD Assigned Heart and Vascular Provider   9/23/23     Phone: [phone] Fax: [phone]         [address] [place] [address]    Francis Vyas MD Assigned Surgical Provider   10/28/23     Phone: [phone] Fax: [phone]         [address] [place] [address]                My Care Plans  Self Management and Treatment Plan    Care Plan  Care Plan: Annual Wellness       Problem: Appointment       Goal: I will complete my annual wellness visit.       Start Date: 5/20/2022 Expected End Date: 10/2/2022    This Visit's Progress: 20% Recent Progress: 20%    Note:     Barriers: complex care, and high volume of appointments at baseline  Strengths: wife is pts main caregiver, and organizes his appointments.   Patient expressed understanding of goal: yes  Action steps to achieve this goal:  1. I will schedule my annual wellness visit; [phone])  2. I will attend my annual wellness visit.  3. I will contact my Care Management or clinic team if I have barriers to attending my annual wellness visit.                                Action Plans on File:                       Advance Care Plans/Directives:   Advanced Care Plan/Directives on file:   Yes    Status of Document(s): No data recorded  Advanced Care Plan/Directives Type:   Advanced Directive - On File           My Medical and Care Information  Problem List   Patient Active Problem List   Diagnosis    Peptic ulcer disease    Gastric bypass status for obesity    Chronic abdominal pain    Vomiting    Anemia    ADHD (attention deficit hyperactivity disorder),  inattentive type    Vitamin B12 deficiency without anemia    Thiamine deficiency    Dehydration    Dysphagia    Weight loss, non-intentional    Malnutrition (H24)    Chronic anxiety    Constipation    Bile reflux esophagitis    Former smoker    Vitamin D deficiency    Iron deficiency    Health Care Home    Chronic pain    Insomnia    Positive blood culture    Fungemia    Chronic nausea    Short gut syndrome    Anxiety    Status post cervical spinal arthrodesis    Port or reservoir infection, initial encounter    Abnormal echocardiogram    Low serum iron    Anemia, iron deficiency    Catheter-related bloodstream infection (CRBSI)    Generalized weakness    S/P bariatric surgery    Hyperlipidemia LDL goal <130    Infection by Candida species    PICC line infiltration, sequela    Gram-positive bacteremia    On total parenteral nutrition    Gastric outlet obstruction    Short bowel syndrome    Bloodstream infection associated with central venous catheter, initial encounter    Fever, unknown origin    Acute deep vein thrombosis (DVT) of axillary vein of right upper extremity (H)    Bacteremia associated with intravascular line, initial encounter     Gram-negative bacteremia    Cellulitis of left upper extremity    Acute deep vein thrombosis (DVT) of axillary vein of left upper extremity (H)    Jejunal intussusception (H)      Current Medications and Allergies:    Allergies   Allergen Reactions    Bactrim [Sulfamethoxazole-Trimethoprim] Rash    Penicillins Anaphylaxis     Please see Antimicrobial Management Team allergy assessment note 10/10/2018. Patient reported tolerating amoxicillin.  Tolerating cefepime and ceftriaxone without reaction 6/23    Ertapenem Nausea and Vomiting    Doxycycline Rash    Vancomycin Rash     Rash after receiving vancomycin 3/28/16 (infusion reaction?). Tolerated with slower infusion and diphenhydramine premed.  Tolerated 1250mg over 90minutes 7/2023.     Current Outpatient Medications  "  Medication    albuterol (VENTOLIN HFA) 108 (90 Base) MCG/ACT inhaler    ALPRAZolam (XANAX) 0.5 MG tablet    amphetamine-dextroamphetamine (ADDERALL) 20 MG tablet    carvedilol (COREG) 6.25 MG tablet    cyanocobalamin (CYANOCOBALAMIN) 1000 MCG/ML injection    enoxaparin ANTICOAGULANT (LOVENOX) 80 MG/0.8ML syringe    [facility] MANAGED PATIENT    fentaNYL (DURAGESIC) 25 mcg/hr 72 hr patch    Lidocaine (LIDOCARE) 4 % Patch    lipids plant base (CLINOLIPID) 20 % infusion    naloxone (NARCAN) 4 MG/0.1ML nasal spray    nystatin (MYCOSTATIN) 728609 UNIT/GM external cream    ondansetron (ZOFRAN ODT) 8 MG ODT tab    oxyCODONE (ROXICODONE) 5 MG/5ML solution    pantoprazole (PROTONIX) 40 MG EC tablet    polyethylene glycol (MIRALAX) 17 GM/Dose powder    pregabalin (LYRICA) 25 MG capsule    sucralfate (CARAFATE) 1 GM/10ML suspension    Syringe/Needle, Disp, (B-D ECLIPSE SYRINGE) 27G X 1/2\" 1 ML MISC    vitamin D2 (ERGOCALCIFEROL) 73758 units (1250 mcg) capsule     No current facility-administered medications for this visit.         Care Coordination Start Date: 5/20/2022   Frequency of Care Coordination: monthly, more frequently as needed     Form Last Updated: 12/28/2023       "

## 2023-12-28 NOTE — PROGRESS NOTES
Clinic Care Coordination Contact  [facility]/Voicemail    Clinical Data: Care Coordinator Outreach    Outreach Documentation Number of Outreach Attempt   11/20/2023  10:51 AM 1   12/28/2023  12:05 PM 1       Left message on patient's voicemail with call back information and requested return call.    Plan: Care Coordinator sent care coordination introduction letter on 5/20/2022 via [employer]. Care Coordinator will try to reach patient again in 10 business days.    RN CC sent updated complex care plan to patient via [employer].     Kinsey Muhammad RN Care Coordination   [facility]  Email: [email or web address]  Phone: [phone]

## 2024-01-02 ENCOUNTER — LAB REQUISITION (OUTPATIENT)
Dept: LAB | Facility: CLINIC | Age: 54
End: 2024-01-02
Payer: COMMERCIAL

## 2024-01-02 DIAGNOSIS — R13.10 DYSPHAGIA, UNSPECIFIED: ICD-10-CM

## 2024-01-02 LAB
ALBUMIN SERPL BCG-MCNC: 3.8 G/DL (ref 3.5–5.2)
ALP SERPL-CCNC: 102 U/L (ref 40–150)
ALT SERPL W P-5'-P-CCNC: 24 U/L (ref 0–70)
ANION GAP SERPL CALCULATED.3IONS-SCNC: 8 MMOL/L (ref 7–15)
AST SERPL W P-5'-P-CCNC: 15 U/L (ref 0–45)
BASOPHILS # BLD AUTO: 0.1 10E3/UL (ref 0–0.2)
BASOPHILS NFR BLD AUTO: 1 %
BILIRUB SERPL-MCNC: 0.3 MG/DL
BILIRUB SERPL-MCNC: 0.3 MG/DL
BUN SERPL-MCNC: 13.7 MG/DL (ref 6–20)
CALCIUM SERPL-MCNC: 8.6 MG/DL (ref 8.6–10)
CHLORIDE SERPL-SCNC: 106 MMOL/L (ref 98–107)
CREAT SERPL-MCNC: 0.7 MG/DL (ref 0.67–1.17)
DEPRECATED HCO3 PLAS-SCNC: 26 MMOL/L (ref 22–29)
EGFRCR SERPLBLD CKD-EPI 2021: >90 ML/MIN/1.73M2
EOSINOPHIL # BLD AUTO: 0.2 10E3/UL (ref 0–0.7)
EOSINOPHIL NFR BLD AUTO: 3 %
ERYTHROCYTE [DISTWIDTH] IN BLOOD BY AUTOMATED COUNT: 18.6 % (ref 10–15)
FASTING STATUS PATIENT QL REPORTED: NORMAL
GLUCOSE SERPL-MCNC: 98 MG/DL (ref 70–99)
HCT VFR BLD AUTO: 29.8 % (ref 40–53)
HGB BLD-MCNC: 8.8 G/DL (ref 13.3–17.7)
IMM GRANULOCYTES # BLD: 0 10E3/UL
IMM GRANULOCYTES NFR BLD: 1 %
LYMPHOCYTES # BLD AUTO: 2 10E3/UL (ref 0.8–5.3)
LYMPHOCYTES NFR BLD AUTO: 30 %
MAGNESIUM SERPL-MCNC: 2 MG/DL (ref 1.7–2.3)
MCH RBC QN AUTO: 23.3 PG (ref 26.5–33)
MCHC RBC AUTO-ENTMCNC: 29.5 G/DL (ref 31.5–36.5)
MCV RBC AUTO: 79 FL (ref 78–100)
MONOCYTES # BLD AUTO: 0.6 10E3/UL (ref 0–1.3)
MONOCYTES NFR BLD AUTO: 9 %
NEUTROPHILS # BLD AUTO: 3.7 10E3/UL (ref 1.6–8.3)
NEUTROPHILS NFR BLD AUTO: 56 %
NRBC # BLD AUTO: 0 10E3/UL
NRBC BLD AUTO-RTO: 0 /100
PHOSPHATE SERPL-MCNC: 3.1 MG/DL (ref 2.5–4.5)
PLATELET # BLD AUTO: 327 10E3/UL (ref 150–450)
POTASSIUM SERPL-SCNC: 3.9 MMOL/L (ref 3.4–5.3)
PROT SERPL-MCNC: 7 G/DL (ref 6.4–8.3)
RBC # BLD AUTO: 3.78 10E6/UL (ref 4.4–5.9)
SODIUM SERPL-SCNC: 140 MMOL/L (ref 135–145)
TRIGL SERPL-MCNC: 76 MG/DL
WBC # BLD AUTO: 6.6 10E3/UL (ref 4–11)

## 2024-01-02 PROCEDURE — 85025 COMPLETE CBC W/AUTO DIFF WBC: CPT | Performed by: SURGERY

## 2024-01-02 PROCEDURE — 80053 COMPREHEN METABOLIC PANEL: CPT | Performed by: SURGERY

## 2024-01-02 PROCEDURE — 84100 ASSAY OF PHOSPHORUS: CPT | Performed by: SURGERY

## 2024-01-02 PROCEDURE — 83735 ASSAY OF MAGNESIUM: CPT | Performed by: SURGERY

## 2024-01-02 PROCEDURE — 84478 ASSAY OF TRIGLYCERIDES: CPT | Performed by: SURGERY

## 2024-01-05 ENCOUNTER — MYC REFILL (OUTPATIENT)
Dept: INTERNAL MEDICINE | Facility: CLINIC | Age: 54
End: 2024-01-05
Payer: COMMERCIAL

## 2024-01-05 DIAGNOSIS — F41.9 CHRONIC ANXIETY: ICD-10-CM

## 2024-01-05 DIAGNOSIS — F90.0 ADHD (ATTENTION DEFICIT HYPERACTIVITY DISORDER), INATTENTIVE TYPE: ICD-10-CM

## 2024-01-05 DIAGNOSIS — R93.1 ABNORMAL ECHOCARDIOGRAM: ICD-10-CM

## 2024-01-05 DIAGNOSIS — E55.9 VITAMIN D DEFICIENCY: ICD-10-CM

## 2024-01-08 RX ORDER — DEXTROAMPHETAMINE SACCHARATE, AMPHETAMINE ASPARTATE, DEXTROAMPHETAMINE SULFATE AND AMPHETAMINE SULFATE 5; 5; 5; 5 MG/1; MG/1; MG/1; MG/1
TABLET ORAL
Qty: 30 TABLET | Refills: 0 | Status: SHIPPED | OUTPATIENT
Start: 2024-01-08 | End: 2024-02-06

## 2024-01-08 RX ORDER — ALPRAZOLAM 0.5 MG
TABLET ORAL
Qty: 60 TABLET | Refills: 0 | Status: SHIPPED | OUTPATIENT
Start: 2024-01-08 | End: 2024-02-06

## 2024-01-08 RX ORDER — ERGOCALCIFEROL 1.25 MG/1
50000 CAPSULE, LIQUID FILLED ORAL WEEKLY
Qty: 12 CAPSULE | Refills: 3 | Status: SHIPPED | OUTPATIENT
Start: 2024-01-08 | End: 2024-05-09

## 2024-01-08 RX ORDER — CARVEDILOL 6.25 MG/1
TABLET ORAL
Qty: 360 TABLET | Refills: 0 | Status: SHIPPED | OUTPATIENT
Start: 2024-01-08 | End: 2024-04-04

## 2024-01-12 ENCOUNTER — PATIENT OUTREACH (OUTPATIENT)
Dept: CARE COORDINATION | Facility: CLINIC | Age: 54
End: 2024-01-12
Payer: COMMERCIAL

## 2024-01-12 NOTE — LETTER
[facility] CARE COORDINATION  [address] [address]  Phone: [phone]      January 12, 2024      Parker Acevedo  [address] [place] [place] [address]    Dear Parker,    We have been trying to reach you to introduce you to [facility] s Care Coordination program.  The goal of care coordination is to help you manage your health and improve access to the [facility] system in the most efficient manner.  The Care Coordinator is a nurse who understands the healthcare system and will assist you in improving your access to care.     As your Physician and Care Coordinator we partner to help you achieve your health care goals.     We will continue to reach out; however, if you are able to call your Care Coordinator at [phone], that would be appreciated.  We at [facility] are focused on providing you with the highest-quality healthcare experience possible.      It is a pleasure to partner with you as we work towards achieving your optimal state of wellness.        Sincerely,    CHRISTY Abdul Paul D None   [address] [facility] [address]

## 2024-01-12 NOTE — PROGRESS NOTES
Clinic Care Coordination Contact  [facility]/Voicemail    Clinical Data: Care Coordinator Outreach    Outreach Documentation Number of Outreach Attempt   11/20/2023  10:51 AM 1   12/28/2023  12:05 PM 1   1/12/2024  11:13 AM 2       Left message on patient's voicemail with call back information and requested return call.    Plan: Care Coordinator will send unable to contact letter with care coordinator contact information via [employer]. Care Coordinator will try to reach patient again in 1 month.    Kinsey Muhammad RN Care Coordination   [facility]  Email: [email or web address]  Phone: [phone]

## 2024-01-16 ENCOUNTER — MYC REFILL (OUTPATIENT)
Dept: PALLIATIVE MEDICINE | Facility: CLINIC | Age: 54
End: 2024-01-16
Payer: COMMERCIAL

## 2024-01-16 ENCOUNTER — MYC REFILL (OUTPATIENT)
Dept: INTERNAL MEDICINE | Facility: CLINIC | Age: 54
End: 2024-01-16
Payer: COMMERCIAL

## 2024-01-16 DIAGNOSIS — R10.9 CHRONIC ABDOMINAL PAIN: ICD-10-CM

## 2024-01-16 DIAGNOSIS — G89.29 CHRONIC BILATERAL LOW BACK PAIN WITHOUT SCIATICA: ICD-10-CM

## 2024-01-16 DIAGNOSIS — G89.4 CHRONIC PAIN SYNDROME: ICD-10-CM

## 2024-01-16 DIAGNOSIS — M54.50 CHRONIC BILATERAL LOW BACK PAIN WITHOUT SCIATICA: ICD-10-CM

## 2024-01-16 DIAGNOSIS — G89.29 CHRONIC ABDOMINAL PAIN: ICD-10-CM

## 2024-01-16 DIAGNOSIS — R19.8 VISCERAL HYPERALGESIA: ICD-10-CM

## 2024-01-16 DIAGNOSIS — R11.0 NAUSEA: ICD-10-CM

## 2024-01-16 DIAGNOSIS — F11.90 CHRONIC, CONTINUOUS USE OF OPIOIDS: ICD-10-CM

## 2024-01-16 RX ORDER — FENTANYL 25 UG/1
1 PATCH TRANSDERMAL
Qty: 15 PATCH | Refills: 0 | Status: SHIPPED | OUTPATIENT
Start: 2024-01-16 | End: 2024-02-15

## 2024-01-16 RX ORDER — ONDANSETRON 8 MG/1
TABLET, ORALLY DISINTEGRATING ORAL
Qty: 90 TABLET | Refills: 1 | Status: SHIPPED | OUTPATIENT
Start: 2024-01-16 | End: 2024-03-07

## 2024-01-16 RX ORDER — OXYCODONE HCL 5 MG/5 ML
5-10 SOLUTION, ORAL ORAL EVERY 4 HOURS PRN
Qty: 1200 ML | Refills: 0 | Status: SHIPPED | OUTPATIENT
Start: 2024-01-16 | End: 2024-02-15

## 2024-01-16 NOTE — TELEPHONE ENCOUNTER
Medication refill information reviewed.     Due date for fentaNYL (DURAGESIC) 25 mcg/hr 72 hr patch and oxyCODONE (ROXICODONE) 5 MG/5ML solution is 01/28/24     Prescriptions prepped for review.     Will route to provider.

## 2024-01-16 NOTE — TELEPHONE ENCOUNTER
Received call from patient requesting refill(s) of fentaNYL (DURAGESIC) 25 mcg/hr 72 hr patch   and   oxyCODONE (ROXICODONE) 5 MG/5ML solution     Last dispensed from pharmacy on 12/27/23 for both    Patient's last office/virtual visit by prescribing provider on 11/01/23  Next office/virtual appointment scheduled for 02/05/24    Last urine drug screen date 11/01/23  Current opioid agreement on file (completed within the last year) Yes Date of opioid agreement: 11/01/23    E-prescribe to pharmacy-[facility] [place] - [place], [place] - [address]     Will route to nursing pool for review and preparation of prescription(s).

## 2024-01-17 NOTE — TELEPHONE ENCOUNTER
Signed Prescriptions:                        Disp   Refills    fentaNYL (DURAGESIC) 25 mcg/hr 72 hr patch 15 pat*0        Sig: Place 1 patch onto the skin every 48 hours Fill           01/26/24 and start 01/28/24. 30 day supply for           chronic pain.  Authorizing Provider: NATALIE MCDOWELL    oxyCODONE (ROXICODONE) 5 MG/5ML solution   1200 mL0        Sig: Take 5-10 mLs (5-10 mg) by mouth every 4 hours as           needed for moderate to severe pain Max of           40mg/day. Fill 01/26/24 and start 01/28/24. 30           day supply for chronic pain.  Authorizing Provider: NATALIE MCDOWELL        Reviewed [place]  January 16, 2024- no concerning fills.    Natalie Mcdowell APRN, RN CNP, FNP  [facility]  [facility]

## 2024-01-18 ENCOUNTER — TELEPHONE (OUTPATIENT)
Dept: INTERNAL MEDICINE | Facility: CLINIC | Age: 54
End: 2024-01-18
Payer: COMMERCIAL

## 2024-01-18 ENCOUNTER — HOSPITAL ENCOUNTER (EMERGENCY)
Facility: CLINIC | Age: 54
Discharge: HOME | End: 2024-01-18
Attending: PHYSICIAN ASSISTANT | Admitting: PHYSICIAN ASSISTANT
Payer: COMMERCIAL

## 2024-01-18 VITALS
RESPIRATION RATE: 18 BRPM | HEART RATE: 87 BPM | WEIGHT: 180 LBS | BODY MASS INDEX: 25.1 KG/M2 | OXYGEN SATURATION: 99 % | DIASTOLIC BLOOD PRESSURE: 96 MMHG | SYSTOLIC BLOOD PRESSURE: 139 MMHG | TEMPERATURE: 97.6 F

## 2024-01-18 DIAGNOSIS — R50.9 FEVER: ICD-10-CM

## 2024-01-18 DIAGNOSIS — Z87.898 HISTORY OF BACTEREMIA: ICD-10-CM

## 2024-01-18 PROCEDURE — 99284 EMERGENCY DEPT VISIT MOD MDM: CPT | Performed by: PHYSICIAN ASSISTANT

## 2024-01-18 PROCEDURE — 87186 SC STD MICRODIL/AGAR DIL: CPT | Performed by: PHYSICIAN ASSISTANT

## 2024-01-18 PROCEDURE — 87149 DNA/RNA DIRECT PROBE: CPT | Performed by: PHYSICIAN ASSISTANT

## 2024-01-18 PROCEDURE — 36415 COLL VENOUS BLD VENIPUNCTURE: CPT | Performed by: PHYSICIAN ASSISTANT

## 2024-01-18 PROCEDURE — 99283 EMERGENCY DEPT VISIT LOW MDM: CPT | Performed by: PHYSICIAN ASSISTANT

## 2024-01-18 ASSESSMENT — ACTIVITIES OF DAILY LIVING (ADL): ADLS_ACUITY_SCORE: 35

## 2024-01-18 NOTE — ED TRIAGE NOTES
Pt is here to have blood cultures done, he was unable to get done in infusion today, he reports he will be called with results in 2 days

## 2024-01-18 NOTE — ED PROVIDER NOTES
"  History     Chief Complaint   Patient presents with    Abnormal Labs     HPI  Parker Acevedo is a 53 year old male who with a history of bacteremia secondary to his Cm catheter, status post bariatric surgery, anemia, chronic pain, and iron deficiency anemia who presents for evaluation of fevers lasting 10-12 hours after any infusion through his Cm catheters.  States he has had this issue multiple times before.  He has direction from his specialist at the [facility] to come into the emergency department for blood cultures at 3 different sites to further investigate this.  He states that with any infusion recently he will develop a fever up to 102.5 lasting anywhere between 4-12 hours.  Symptoms started within a couple hours with the infusion.  He has spontaneous resolution of symptoms.  He never develops any URI symptoms.  Denies rhinorrhea, congestion, otalgia, sore throat, change in taste/smell sensation, dyspnea, or cough.  Denies any dysuria, frequency, urgency, flank pain, or gross hematuria.  No nausea, vomiting, or diarrhea.  No skin rashes or redness.  When asked how he feels, he states \"I feel great\"    Allergies:  Allergies   Allergen Reactions    Bactrim [Sulfamethoxazole-Trimethoprim] Rash    Penicillins Anaphylaxis     Please see Antimicrobial Management Team allergy assessment note 10/10/2018. Patient reported tolerating amoxicillin.  Tolerating cefepime and ceftriaxone without reaction 6/23    Ertapenem Nausea and Vomiting    Doxycycline Rash    Vancomycin Rash     Rash after receiving vancomycin 3/28/16 (infusion reaction?). Tolerated with slower infusion and diphenhydramine premed.  Tolerated 1250mg over 90minutes 7/2023.       Problem List:    Patient Active Problem List    Diagnosis Date Noted    Jejunal intussusception (H) 09/30/2023     Priority: Medium    Cellulitis of left upper extremity 07/04/2023     Priority: Medium    Acute deep vein thrombosis (DVT) of axillary " vein of left upper extremity (H) 07/04/2023     Priority: Medium    Gram-negative bacteremia 07/07/2022     Priority: Medium    Bacteremia associated with intravascular line, initial encounter  (H24) 05/07/2022     Priority: Medium    Acute deep vein thrombosis (DVT) of axillary vein of right upper extremity (H) 02/28/2022     Priority: Medium    Fever, unknown origin 03/19/2021     Priority: Medium    Short bowel syndrome 01/30/2021     Priority: Medium    Bloodstream infection associated with central venous catheter, initial encounter 01/30/2021     Priority: Medium    Gastric outlet obstruction 10/07/2020     Priority: Medium     Added automatically from request for surgery [id number]      Gram-positive bacteremia 09/29/2019     Priority: Medium    On total parenteral nutrition 09/29/2019     Priority: Medium     Added automatically from request for surgery [id number]      PICC line infiltration, sequela 07/22/2019     Priority: Medium    Infection by Candida species 01/04/2019     Priority: Medium    Hyperlipidemia LDL goal <130 08/07/2018     Priority: Medium    S/P bariatric surgery 07/30/2018     Priority: Medium    Generalized weakness 01/30/2018     Priority: Medium    Catheter-related bloodstream infection (CRBSI) 09/23/2017     Priority: Medium    Low serum iron 09/08/2017     Priority: Medium    Anemia, iron deficiency 09/08/2017     Priority: Medium    Abnormal echocardiogram 04/11/2017     Priority: Medium    Port or reservoir infection, initial encounter 03/16/2017     Priority: Medium    Status post cervical spinal arthrodesis 02/15/2017     Priority: Medium    Short gut syndrome      Priority: Medium    Anxiety      Priority: Medium    Chronic nausea 05/10/2016     Priority: Medium    Fungemia 04/11/2016     Priority: Medium    Positive blood culture 03/29/2016     Priority: Medium    Insomnia 08/13/2015     Priority: Medium    Chronic pain 07/07/2015     Priority: Medium     Patient is followed by   [facility] for ongoing prescription of pain medication.  All refills should be approved by this provider, or covering partner.    Medication(s): Fentanyl 50 mcg patches every three days/ Liquid oxycodone 5 mg/5ml up to 50 mg daily.   Maximum quantity per month: as per Dr. Milligan through [facility]  Clinic visit frequency required: Q 3 months at Pain Management    Controlled substance agreement on file: Yes       Date(s): Through Pain Management    Pain Clinic evaluation in the past: Yes       Date(s):  Ongoing every three months       Location(s):  Per pain management    DIRE Total Score(s):  No flowsheet data found.    Last [facility] website verification:  Through Pain Management   [email or web address]    Esteban Daly MD            Health Care Home 02/24/2015     Priority: Medium             Iron deficiency 05/23/2014     Priority: Medium    Vitamin D deficiency 05/22/2014     Priority: Medium    Former smoker 02/24/2014     Priority: Medium    Bile reflux esophagitis 10/16/2013     Priority: Medium    Constipation 10/01/2013     Priority: Medium    Chronic anxiety 09/25/2013     Priority: Medium    Dysphagia 09/17/2013     Priority: Medium    Weight loss, non-intentional 09/17/2013     Priority: Medium    Malnutrition (H24) 09/17/2013     Priority: Medium    Dehydration 08/28/2013     Priority: Medium    Vitamin B12 deficiency without anemia 07/31/2013     Priority: Medium     Diagnosis updated by automated process. Provider to review and confirm.      Thiamine deficiency 07/31/2013     Priority: Medium    ADHD (attention deficit hyperactivity disorder), inattentive type 07/02/2013     Priority: Medium     Patient is followed by RICCO SHARP for ongoing prescription of stimulants.  All refills should be approved by this provider, or covering partner.    Medication(s): Adderall.   Maximum quantity per month: 30  Clinic visit frequency required:      Controlled substance agreement on file:  No  Neuropsych evaluation for ADD completed:  No    Last [facility] website verification:  done on 5/6/19  [email or web address]        Anemia 09/20/2012     Priority: Medium    Vomiting 06/28/2012     Priority: Medium    Chronic abdominal pain 06/01/2012     Priority: Medium     Patient is followed by ALEXANDRIA WHITLEY for ongoing prescription of narcotic pain medicine.  Med: liquid oxycodone up to 60 mg per day.   Maximum use per month:   Expected duration: ongoing, hope per surgery that will resolve with upcoming surgery  Narcotic agreement on file: YES  Clinic visit recommended: Q 3 months        Peptic ulcer disease 04/08/2010     Priority: Medium    Gastric bypass status for obesity 04/08/2010     Priority: Medium     Top weight of 492.          Past Medical History:    Past Medical History:   Diagnosis Date    ADHD (attention deficit hyperactivity disorder)     Anxiety     Cardiomyopathy in nutritional diseases (H)     Chronic abdominal pain     CLABSI (central line-associated bloodstream infection)     Complication of anesthesia     Difficulty swallowing     Gastric ulcer, unspecified as acute or chronic, without mention of hemorrhage, perforation, or obstruction     Gastro-oesophageal reflux disease     Head injury     Hiatal hernia     Other bladder disorder     Other chronic pain     PONV (postoperative nausea and vomiting)     Severe malnutrition (H24)     Short gut syndrome     Tobacco abuse        Past Surgical History:    Past Surgical History:   Procedure Laterality Date    AMPUTATION      APPENDECTOMY      BACK SURGERY  11/3/2014    curve in the spine    BIOPSY LYMPH NODE CERVICAL N/A 2/20/2015    Procedure: BIOPSY LYMPH NODE CERVICAL;  Surgeon: Baron Scanlon MD;  [facility]    CHOLECYSTECTOMY      COLONOSCOPY N/A 7/14/2021    Procedure: COLONOSCOPY;  Surgeon: Jimbo Estrada MD;  Location: [facility] OR    COLONOSCOPY N/A 4/13/2022    Procedure: COLONOSCOPY;  Surgeon:  Jimbo Estrada MD;  Location: [facility] OR    COLONOSCOPY N/A 9/19/2023    Procedure: Colonoscopy;  Surgeon: Jimbo Estrada MD;  Location: [facility] OR    DISCECTOMY, FUSION CERVICAL ANTERIOR ONE LEVEL, COMBINED N/A 2/15/2017    Procedure: COMBINED DISCECTOMY, FUSION CERVICAL ANTERIOR ONE LEVEL;  Surgeon: Darren Campos MD;  [facility]    ENDOSCOPIC INSERTION TUBE GASTROSTOMY  9/9/2013    Procedure: ENDOSCOPIC INSERTION TUBE GASTROSTOMY;;  Surgeon: Francis Vyas MD;  [facility]    ENDOSCOPIC ULTRASOUND UPPER GASTROINTESTINAL TRACT (GI)  4/29/2011    Procedure:ENDOSCOPIC ULTRASOUND UPPER GASTROINTESTINAL TRACT (GI); Both Procedures done Conjointly; Surgeon:NEREIDA HOUSER; [facility]    ENDOSCOPIC ULTRASOUND UPPER GASTROINTESTINAL TRACT (GI)  9/9/2013    Procedure: ENDOSCOPIC ULTRASOUND UPPER GASTROINTESTINAL TRACT (GI);  Endoscopic Ultrasound Guide Gastrostomy Tube Placement  C-arm;  Surgeon: Noe Lizarraga MD;  [facility]    ENDOSCOPIC ULTRASOUND UPPER GASTROINTESTINAL TRACT (GI) N/A 2/24/2021    Procedure: ENDOSCOPIC ULTRASOUND, ESOPHAGOSCOPY / UPPER GASTROINTESTINAL TRACT (GI), esophagastrogastroduodenoscopy;  Surgeon: Berny Bach MD;  [facility]    ENDOSCOPY  03/25/11    EGD, [facility]    ENDOSCOPY  08/04/09    Upper Endoscopy, [facility]    ENDOSCOPY  01/05/09    Upper Endoscopy, [facility]    ESOPHAGOSCOPY, GASTROSCOPY, DUODENOSCOPY (EGD), COMBINED  4/20/2011    Procedure:COMBINED ESOPHAGOSCOPY, GASTROSCOPY, DUODENOSCOPY (EGD); Surgeon:FRANCIS VYAS; [facility]    ESOPHAGOSCOPY, GASTROSCOPY, DUODENOSCOPY (EGD), COMBINED  6/15/2011    Procedure:COMBINED ESOPHAGOSCOPY, GASTROSCOPY, DUODENOSCOPY (EGD); Surgeon:FRANCIS VYAS; [facility]    ESOPHAGOSCOPY, GASTROSCOPY, DUODENOSCOPY (EGD), COMBINED  6/12/2013    Procedure: COMBINED ESOPHAGOSCOPY, GASTROSCOPY, DUODENOSCOPY (EGD);;  Surgeon: Francis Vyas  MD Lg;  Location:  GI    ESOPHAGOSCOPY, GASTROSCOPY, DUODENOSCOPY (EGD), COMBINED  11/22/2013    Procedure: COMBINED ESOPHAGOSCOPY, GASTROSCOPY, DUODENOSCOPY (EGD);;  Surgeon: Francis Vyas MD;  [facility]    ESOPHAGOSCOPY, GASTROSCOPY, DUODENOSCOPY (EGD), COMBINED  4/30/2014    Procedure: COMBINED ESOPHAGOSCOPY, GASTROSCOPY, DUODENOSCOPY (EGD);  Surgeon: Francis Vyas MD;  Location:  GI    ESOPHAGOSCOPY, GASTROSCOPY, DUODENOSCOPY (EGD), COMBINED N/A 2/20/2015    Procedure: COMBINED ESOPHAGOSCOPY, GASTROSCOPY, DUODENOSCOPY (EGD), BIOPSY SINGLE OR MULTIPLE;  Surgeon: Baron Scanlon MD;  Location:  OR    ESOPHAGOSCOPY, GASTROSCOPY, DUODENOSCOPY (EGD), COMBINED N/A 9/30/2015    Procedure: COMBINED ESOPHAGOSCOPY, GASTROSCOPY, DUODENOSCOPY (EGD);  Surgeon: Francis Vyas MD;  Location:  GI    ESOPHAGOSCOPY, GASTROSCOPY, DUODENOSCOPY (EGD), COMBINED N/A 10/3/2019    Procedure: Upper Endoscopy;  Surgeon: Clif Morrow MD;  Location:  OR    GASTRECTOMY  6/22/2012    Procedure: GASTRECTOMY;  Open Approach, Excise Ulcers,Partial Gastrectomy, Esophagojejunostomy, Hiatal Hernia Repair, Extensive Lysis of Adhesions and Esaphagogastrodudenoscopy.;  Surgeon: Francis Vyas MD;  [facility]    GASTROJEJUNOSTOMY  08/26/09    Extensice enterolysis, partial resect. jejunum, part. resect gastric pouch, gastrojejunostomy anastomosis    HC ESOPH/GAS REFLUX TEST W NASAL IMPED ELECTRODE  8/5/2013    Procedure: ESOPHAGEAL IMPEDENCE FUNCTION TEST 1 HOUR OR LESS;  Surgeon: Halie Lang MD;  Location:  GI    HEAD & NECK SURGERY  2/15/2017    C5-C6    HERNIA REPAIR  2006    Umbilical hernia    HERNIORRHAPHY HIATAL  6/22/2012    Procedure: HERNIORRHAPHY HIATAL;;  Surgeon: Francis Vyas MD;  [facility]    HERNIORRHAPHY INGUINAL  11/22/2013    Procedure: HERNIORRHAPHY INGUINAL;;  Surgeon: Francis Vyas MD;  [facility]    INSERT PICC LINE Right  12/19/2019    Procedure: Picc Placement;  Surgeon: Per Dumont PA-C;  [facility]    INSERT PICC LINE Right 2/21/2020    Procedure: INSERTION, PICC;  Surgeon: Per Dumont PA-C;  [facility]    INSERT PORT VASCULAR ACCESS Right 12/19/2017    Procedure: INSERT PORT VASCULAR ACCESS;  Right Chest Port Placement ;  Surgeon: Lisandro Alejandro PA-C;  [facility]    INSERT PORT VASCULAR ACCESS Right 8/2/2018    Procedure: INSERT PORT VASCULAR ACCESS;  Place single lumen tunneled central venous access catheter;  Surgeon: Guy Jamil PA-C;  [facility]    IR CVC TUNNEL PLACEMENT > 5 YRS OF AGE  8/7/2019    IR CVC TUNNEL PLACEMENT > 5 YRS OF AGE  4/14/2020    IR CVC TUNNEL PLACEMENT > 5 YRS OF AGE  8/3/2020    IR CVC TUNNEL PLACEMENT > 5 YRS OF AGE  9/4/2020    IR CVC TUNNEL PLACEMENT > 5 YRS OF AGE  2/5/2021    IR CVC TUNNEL PLACEMENT > 5 YRS OF AGE  3/23/2021    IR CVC TUNNEL PLACEMENT > 5 YRS OF AGE  1/13/2022    IR CVC TUNNEL PLACEMENT > 5 YRS OF AGE  5/12/2022    IR CVC TUNNEL PLACEMENT > 5 YRS OF AGE  7/13/2022    IR CVC TUNNEL PLACEMENT > 5 YRS OF AGE  7/10/2023    IR CVC TUNNEL PLACEMENT > 5 YRS OF AGE  11/17/2023    IR CVC TUNNEL REMOVAL LEFT  6/7/2023    IR CVC TUNNEL REMOVAL LEFT  11/14/2023    IR CVC TUNNEL REMOVAL RIGHT  10/1/2019    IR CVC TUNNEL REMOVAL RIGHT  7/30/2020    IR CVC TUNNEL REMOVAL RIGHT  9/2/2020    IR CVC TUNNEL REMOVAL RIGHT  2/3/2021    IR CVC TUNNEL REMOVAL RIGHT  3/19/2021    IR CVC TUNNEL REMOVAL RIGHT  1/10/2022    IR CVC TUNNEL REMOVAL RIGHT  5/9/2022    IR CVC TUNNEL REMOVAL RIGHT  7/8/2022    IR CVC TUNNEL REVISION LEFT  8/10/2022    IR CVC TUNNEL REVISION LEFT  9/19/2023    IR CVC TUNNEL REVISION RIGHT  5/7/2021    IR FOLLOW UP VISIT OUTPATIENT  8/7/2019    IR PICC EXCHANGE LEFT  6/8/2023    IR PICC PLACEMENT > 5 YRS OF AGE  3/7/2019    IR PICC PLACEMENT > 5 YRS OF AGE  12/19/2019    IR PICC PLACEMENT > 5 YRS OF AGE  2/21/2020    IR PICC  PLACEMENT > 5 YRS OF AGE  6/7/2023    LAPAROTOMY EXPLORATORY  11/22/2013    Procedure: LAPAROTOMY EXPLORATORY;  Exploratory Laparotomy, Upper Endoscopy, Left Inguinal Hernia Repair;  Surgeon: Francis Vyas MD;  [facility]    LAPAROTOMY EXPLORATORY N/A 9/30/2023    Procedure: Laparotomy exploratory, reduction of intussusception, resection of small bowel with primary anastamosis, upper endoscopy;  Surgeon: Delvis Mercado MD;  [facility]    ORTHOPEDIC SURGERY      PICC INSERTION Right 03/16/2017    5fr DL BioFlo PICC, 42cm (3cm external) in the R medial brachial vein w/ tip in the SVC RA junction.    PICC INSERTION Left 09/23/2017    5fr DL BioFlo PICC, 45cm (1cm external) in the L basilic vein w/ tip in the SVC RA junction.    PICC INSERTION Right 05/16/2019    5Fr - 43cm, Medial brachial vein, low SVC    PICC INSERTION Right 10/02/2019    5Fr - 43cm (2cm external), basilic vein, low SVC    SHAYLEE EN Y BOWEL  2003    SOFT TISSUE SURGERY      THORACIC SURGERY      TONSILLECTOMY      TRANSESOPHAGEAL ECHOCARDIOGRAM INTRAOPERATIVE N/A 1/8/2019    Procedure: TRANSESOPHAGEAL ECHOCARDIOGRAM INTRAOPERATIVE;  Surgeon: GENERIC ANESTHESIA PROVIDER;  [facility]    TRANSESOPHAGEAL ECHOCARDIOGRAM INTRAOPERATIVE N/A 7/7/2023    Procedure: Transesophageal echocardiogram in the OR;  Surgeon: GENERIC ANESTHESIA PROVIDER;  [facility]    TRANSESOPHAGEAL ECHOCARDIOGRAM INTRAOPERATIVE N/A 11/17/2023    Procedure: ECHOCARDIOGRAM,TRANSESOPHAGEAL,WITH ANESTHESIA;  Surgeon: GENERIC ANESTHESIA PROVIDER;  [facility]    ZZC GASTRIC BYPASS,OBESE<100CM SHAYLEE-EN-Y  2002    lost 300 pounds       Family History:    Family History   Problem Relation Age of Onset    Gastrointestinal Disease Mother         Crohns disease    Anxiety Disorder Mother     Thyroid Disease Mother         Grave's disease    Cancer Father         ear cancer-skin cancer/melanoma    Breast Cancer Maternal Grandmother     Macular Degeneration  "Maternal Grandfather     Anxiety Disorder Sister     Diabetes Maternal Uncle     Breast Cancer Other     Hypertension No family hx of     Hyperlipidemia No family hx of     Cerebrovascular Disease No family hx of     Prostate Cancer No family hx of     Depression No family hx of     Anesthesia Reaction No family hx of     Asthma No family hx of     Osteoporosis No family hx of     Genetic Disorder No family hx of     Obesity No family hx of     Mental Illness No family hx of     Substance Abuse No family hx of     Glaucoma No family hx of        Social History:  Marital Status:   [2]  Social History     Tobacco Use    Smoking status: Light Smoker     Packs/day: 0.50     Years: 3.00     Additional pack years: 0.00     Total pack years: 1.50     Types: Cigarettes    Smokeless tobacco: Never    Tobacco comments:     2/4/2021    smokes 3 cigarettes/day   Vaping Use    Vaping Use: Never used   Substance Use Topics    Alcohol use: No     Comment: quit 2002    Drug use: No        Medications:    albuterol (VENTOLIN HFA) 108 (90 Base) MCG/ACT inhaler  ALPRAZolam (XANAX) 0.5 MG tablet  amphetamine-dextroamphetamine (ADDERALL) 20 MG tablet  carvedilol (COREG) 6.25 MG tablet  cyanocobalamin (CYANOCOBALAMIN) 1000 MCG/ML injection  enoxaparin ANTICOAGULANT (LOVENOX) 80 MG/0.8ML syringe  [facility]  fentaNYL (DURAGESIC) 25 mcg/hr 72 hr patch  Lidocaine (LIDOCARE) 4 % Patch  lipids plant base (CLINOLIPID) 20 % infusion  naloxone (NARCAN) 4 MG/0.1ML nasal spray  nystatin (MYCOSTATIN) 460054 UNIT/GM external cream  ondansetron (ZOFRAN ODT) 8 MG ODT tab  oxyCODONE (ROXICODONE) 5 MG/5ML solution  pantoprazole (PROTONIX) 40 MG EC tablet  polyethylene glycol (MIRALAX) 17 GM/Dose powder  pregabalin (LYRICA) 25 MG capsule  sucralfate (CARAFATE) 1 GM/10ML suspension  Syringe/Needle, Disp, (B-D ECLIPSE SYRINGE) 27G X 1/2\" 1 ML MISC  vitamin D2 (ERGOCALCIFEROL) 94326 units (1250 mcg) capsule          Review " of Systems   All other systems reviewed and are negative.      Physical Exam   BP: (!) 139/96  Pulse: 87  Temp: 97.6  F (36.4  C)  Resp: 18  Weight: 81.6 kg (180 lb)  SpO2: 99 %      Physical Exam  Vitals and nursing note reviewed.   Constitutional:       General: He is not in acute distress.     Appearance: He is not diaphoretic.   HENT:      Head: Normocephalic and atraumatic.      Right Ear: Tympanic membrane, ear canal and external ear normal.      Left Ear: Tympanic membrane, ear canal and external ear normal.      Nose: Nose normal. No congestion or rhinorrhea.      Mouth/Throat:      Mouth: Mucous membranes are moist.      Pharynx: No oropharyngeal exudate or posterior oropharyngeal erythema.   Eyes:      General: No scleral icterus.        Right eye: No discharge.         Left eye: No discharge.      Conjunctiva/sclera: Conjunctivae normal.      Pupils: Pupils are equal, round, and reactive to light.   Neck:      Thyroid: No thyromegaly.   Cardiovascular:      Rate and Rhythm: Normal rate and regular rhythm.      Heart sounds: Normal heart sounds. No murmur heard.  Pulmonary:      Effort: Pulmonary effort is normal. No respiratory distress.      Breath sounds: Normal breath sounds. No wheezing or rales.   Chest:      Chest wall: No tenderness.   Abdominal:      General: Bowel sounds are normal. There is no distension.      Palpations: Abdomen is soft. There is no mass.      Tenderness: There is no abdominal tenderness. There is no guarding or rebound.   Musculoskeletal:         General: No deformity. Normal range of motion.      Cervical back: Normal range of motion and neck supple. No rigidity or tenderness.   Lymphadenopathy:      Cervical: No cervical adenopathy.   Skin:     General: Skin is warm and dry.      Capillary Refill: Capillary refill takes less than 2 seconds.      Findings: No erythema or rash.   Neurological:      Mental Status: He is alert and oriented to person, place, and time.       Cranial Nerves: No cranial nerve deficit.   Psychiatric:         Behavior: Behavior normal.         Thought Content: Thought content normal.         ED Course                 Procedures              Critical Care time:  none               No results found. However, due to the size of the patient record, not all encounters were searched. Please check Results Review for a complete set of results.    Medications - No data to display    Assessments & Plan (with Medical Decision Making)     Fever  History of bacteremia     53 year old male with a history of Cm catheter in place, history of bacteremia secondary to peripheral catheter status, status post bariatric surgery, anemia, chronic pain, and iron deficiency anemia who presents for evaluation of fevers lasting 4-12 hours after any infusion therapy recently through his Cm catheters.  Sent to the emergency department to have blood cultures performed.  Patient states he otherwise feels great.  Denies any other URI, , or GI symptoms.  No skin rashes.  He states that his specialist at the [facility] just wants blood cultures performed.  They are monitoring all of his other laboratory levels chronically.  On exam blood pressure 139/96, temperature 97.6, pulse 87, respiration 18, oxygen saturation 99% on room air.  Patient is in no acute distress.  Very tearful.  Laughing.  Conversational.  Does not appear to be toxic.  ENT exam negative.  Cardiopulmonary exam without adventitious lung sounds.  No abdominal tenderness.  No hepatosplenomegaly.  Reassuring exam.  Stable vital signs.  No SIRS criteria.  No indication that he has symptomatic sepsis currently.  Based on his history, he certainly does need the blood cultures which were ordered.  He will be contacted with the results.  Further follow-up under the direction of his specialist at the [facility].  They have told him that they will call him with the results and further direction.   Patient is stable for discharge currently.  No active infectious symptoms to suggest further workup today.     I have reviewed the nursing notes.    I have reviewed the findings, diagnosis, plan and need for follow up with the patient.           Medical Decision Making  The patient's presentation was of low complexity (an acute and uncomplicated illness or injury).    The patient's evaluation involved:  history and exam without other MDM data elements    The patient's management necessitated only low risk treatment.        New Prescriptions    No medications on file       Final diagnoses:   Fever   History of bacteremia     Disclaimer: This note consists of symbols derived from keyboarding, dictation and/or voice recognition software. As a result, there may be errors in the script that have gone undetected. Please consider this when interpreting information found in this chart.        1/18/2024   [facility] EMERGENCY DEPT       Shay Beasley PA-C  01/18/24 [id number]

## 2024-01-18 NOTE — DISCHARGE INSTRUCTIONS
It was a pleasure working with you today!  I hope your condition improves rapidly!     You will be contacted with the blood culture results with further guidance on how to move forward with things.

## 2024-01-18 NOTE — TELEPHONE ENCOUNTER
Home Care is calling regarding an established patient with [facility].    Requesting orders from: Chuy Isaac  Provider is following patient: Yes  Is this a 60-day recertification request?  Yes    Orders Requested    Skilled Nursing  Request for recertification    Frequency:  1x/wk for 8 wks    Information was gathered and will be sent to provider for review.  RN will contact Home Care with information after provider review.    Confirmed ok to leave a detailed message with call back.  Contact information confirmed and updated as needed.    Yadira Jaimes RN

## 2024-01-19 ENCOUNTER — HOSPITAL ENCOUNTER (INPATIENT)
Facility: CLINIC | Age: 54
LOS: 6 days | Discharge: HOME | DRG: 315 | End: 2024-01-25
Attending: EMERGENCY MEDICINE | Admitting: INTERNAL MEDICINE
Payer: COMMERCIAL

## 2024-01-19 ENCOUNTER — PATIENT OUTREACH (OUTPATIENT)
Dept: CARE COORDINATION | Facility: CLINIC | Age: 54
End: 2024-01-19
Payer: COMMERCIAL

## 2024-01-19 DIAGNOSIS — R78.81 BACTEREMIA: ICD-10-CM

## 2024-01-19 DIAGNOSIS — T82.7XXA BACTEREMIA ASSOCIATED WITH INTRAVASCULAR LINE, INITIAL ENCOUNTER (H): ICD-10-CM

## 2024-01-19 DIAGNOSIS — K59.00 CONSTIPATION, UNSPECIFIED CONSTIPATION TYPE: ICD-10-CM

## 2024-01-19 DIAGNOSIS — R78.81 BACTEREMIA ASSOCIATED WITH INTRAVASCULAR LINE, INITIAL ENCOUNTER (H): ICD-10-CM

## 2024-01-19 DIAGNOSIS — R78.81 GRAM-NEGATIVE BACTEREMIA: Primary | ICD-10-CM

## 2024-01-19 LAB
ACINETOBACTER SPECIES: NOT DETECTED
ALBUMIN SERPL BCG-MCNC: 3.9 G/DL (ref 3.5–5.2)
ALP SERPL-CCNC: 78 U/L (ref 40–150)
ALT SERPL W P-5'-P-CCNC: 10 U/L (ref 0–70)
ANION GAP SERPL CALCULATED.3IONS-SCNC: 8 MMOL/L (ref 7–15)
AST SERPL W P-5'-P-CCNC: 17 U/L (ref 0–45)
BASOPHILS # BLD AUTO: 0.1 10E3/UL (ref 0–0.2)
BASOPHILS NFR BLD AUTO: 1 %
BILIRUB SERPL-MCNC: 0.2 MG/DL
BUN SERPL-MCNC: 12 MG/DL (ref 6–20)
CALCIUM SERPL-MCNC: 8.8 MG/DL (ref 8.6–10)
CHLORIDE SERPL-SCNC: 108 MMOL/L (ref 98–107)
CITROBACTER SPECIES: NOT DETECTED
CREAT SERPL-MCNC: 0.85 MG/DL (ref 0.67–1.17)
CTX-M: NOT DETECTED
DEPRECATED HCO3 PLAS-SCNC: 26 MMOL/L (ref 22–29)
EGFRCR SERPLBLD CKD-EPI 2021: >90 ML/MIN/1.73M2
ENTEROBACTER SPECIES: NOT DETECTED
ENTEROCOCCUS FAECALIS: NOT DETECTED
ENTEROCOCCUS FAECIUM: NOT DETECTED
EOSINOPHIL # BLD AUTO: 0.2 10E3/UL (ref 0–0.7)
EOSINOPHIL NFR BLD AUTO: 2 %
ERYTHROCYTE [DISTWIDTH] IN BLOOD BY AUTOMATED COUNT: 19.1 % (ref 10–15)
ESCHERICHIA COLI: DETECTED
GLUCOSE SERPL-MCNC: 104 MG/DL (ref 70–99)
HCT VFR BLD AUTO: 33.6 % (ref 40–53)
HGB BLD-MCNC: 9.9 G/DL (ref 13.3–17.7)
HOLD SPECIMEN: NORMAL
HOLD SPECIMEN: NORMAL
IMM GRANULOCYTES # BLD: 0 10E3/UL
IMM GRANULOCYTES NFR BLD: 0 %
IMP: NOT DETECTED
KLEBSIELLA OXYTOCA: NOT DETECTED
KLEBSIELLA PNEUMONIAE: NOT DETECTED
KPC: NOT DETECTED
LACTATE SERPL-SCNC: 1.2 MMOL/L (ref 0.7–2)
LISTERIA SPECIES (DETECTED/NOT DETECTED): NOT DETECTED
LYMPHOCYTES # BLD AUTO: 2 10E3/UL (ref 0.8–5.3)
LYMPHOCYTES NFR BLD AUTO: 18 %
MAGNESIUM SERPL-MCNC: 3.8 MG/DL (ref 1.7–2.3)
MCH RBC QN AUTO: 23.3 PG (ref 26.5–33)
MCHC RBC AUTO-ENTMCNC: 29.5 G/DL (ref 31.5–36.5)
MCV RBC AUTO: 79 FL (ref 78–100)
MONOCYTES # BLD AUTO: 0.8 10E3/UL (ref 0–1.3)
MONOCYTES NFR BLD AUTO: 7 %
NDM: NOT DETECTED
NEUTROPHILS # BLD AUTO: 8.1 10E3/UL (ref 1.6–8.3)
NEUTROPHILS NFR BLD AUTO: 72 %
NRBC # BLD AUTO: 0 10E3/UL
NRBC BLD AUTO-RTO: 0 /100
OXA (DETECTED/NOT DETECTED): NOT DETECTED
PLATELET # BLD AUTO: 313 10E3/UL (ref 150–450)
POTASSIUM SERPL-SCNC: 4.3 MMOL/L (ref 3.4–5.3)
PROT SERPL-MCNC: 7.3 G/DL (ref 6.4–8.3)
PROTEUS SPECIES: NOT DETECTED
PSEUDOMONAS AERUGINOSA: NOT DETECTED
RBC # BLD AUTO: 4.25 10E6/UL (ref 4.4–5.9)
SODIUM SERPL-SCNC: 142 MMOL/L (ref 135–145)
STAPHYLOCOCCUS AUREUS: NOT DETECTED
STAPHYLOCOCCUS EPIDERMIDIS: NOT DETECTED
STAPHYLOCOCCUS LUGDUNENSIS: NOT DETECTED
STAPHYLOCOCCUS SPECIES: NOT DETECTED
STREPTOCOCCUS AGALACTIAE: NOT DETECTED
STREPTOCOCCUS ANGINOSUS GROUP: NOT DETECTED
STREPTOCOCCUS PNEUMONIAE: NOT DETECTED
STREPTOCOCCUS PYOGENES: NOT DETECTED
STREPTOCOCCUS SPECIES: NOT DETECTED
VIM: NOT DETECTED
WBC # BLD AUTO: 11.3 10E3/UL (ref 4–11)

## 2024-01-19 PROCEDURE — 120N000002 HC R&B MED SURG/OB UMMC

## 2024-01-19 PROCEDURE — 99285 EMERGENCY DEPT VISIT HI MDM: CPT | Performed by: EMERGENCY MEDICINE

## 2024-01-19 PROCEDURE — 83735 ASSAY OF MAGNESIUM: CPT | Performed by: EMERGENCY MEDICINE

## 2024-01-19 PROCEDURE — 258N000003 HC RX IP 258 OP 636: Performed by: EMERGENCY MEDICINE

## 2024-01-19 PROCEDURE — 83605 ASSAY OF LACTIC ACID: CPT | Performed by: EMERGENCY MEDICINE

## 2024-01-19 PROCEDURE — 250N000011 HC RX IP 250 OP 636: Performed by: EMERGENCY MEDICINE

## 2024-01-19 PROCEDURE — 36415 COLL VENOUS BLD VENIPUNCTURE: CPT | Performed by: EMERGENCY MEDICINE

## 2024-01-19 PROCEDURE — 87040 BLOOD CULTURE FOR BACTERIA: CPT | Performed by: EMERGENCY MEDICINE

## 2024-01-19 PROCEDURE — 99223 1ST HOSP IP/OBS HIGH 75: CPT | Mod: GC | Performed by: INTERNAL MEDICINE

## 2024-01-19 PROCEDURE — 85025 COMPLETE CBC W/AUTO DIFF WBC: CPT | Performed by: EMERGENCY MEDICINE

## 2024-01-19 PROCEDURE — 80053 COMPREHEN METABOLIC PANEL: CPT | Performed by: EMERGENCY MEDICINE

## 2024-01-19 PROCEDURE — 84145 PROCALCITONIN (PCT): CPT | Performed by: EMERGENCY MEDICINE

## 2024-01-19 PROCEDURE — 3E0436Z INTRODUCTION OF NUTRITIONAL SUBSTANCE INTO CENTRAL VEIN, PERCUTANEOUS APPROACH: ICD-10-PCS | Performed by: INTERNAL MEDICINE

## 2024-01-19 RX ORDER — CEFEPIME HYDROCHLORIDE 2 G/1
2 INJECTION, POWDER, FOR SOLUTION INTRAVENOUS ONCE
Status: COMPLETED | OUTPATIENT
Start: 2024-01-19 | End: 2024-01-20

## 2024-01-19 RX ORDER — DIPHENHYDRAMINE HYDROCHLORIDE 50 MG/ML
25 INJECTION INTRAMUSCULAR; INTRAVENOUS ONCE
Status: COMPLETED | OUTPATIENT
Start: 2024-01-19 | End: 2024-01-19

## 2024-01-19 RX ORDER — DIPHENHYDRAMINE HYDROCHLORIDE 50 MG/ML
25 INJECTION INTRAMUSCULAR; INTRAVENOUS EVERY 12 HOURS
Status: DISCONTINUED | OUTPATIENT
Start: 2024-01-20 | End: 2024-01-25 | Stop reason: HOSPADM

## 2024-01-19 RX ORDER — DIPHENHYDRAMINE HCL 25 MG
25 CAPSULE ORAL EVERY 12 HOURS
Status: DISCONTINUED | OUTPATIENT
Start: 2024-01-20 | End: 2024-01-25 | Stop reason: HOSPADM

## 2024-01-19 RX ADMIN — CEFEPIME HYDROCHLORIDE 2 G: 2 INJECTION, POWDER, FOR SOLUTION INTRAVENOUS at 21:51

## 2024-01-19 RX ADMIN — DIPHENHYDRAMINE HYDROCHLORIDE 25 MG: 50 INJECTION, SOLUTION INTRAMUSCULAR; INTRAVENOUS at 21:42

## 2024-01-19 RX ADMIN — VANCOMYCIN HYDROCHLORIDE 1750 MG: 10 INJECTION, POWDER, LYOPHILIZED, FOR SOLUTION INTRAVENOUS at 22:25

## 2024-01-19 RX ADMIN — SODIUM CHLORIDE 1000 ML: 9 INJECTION, SOLUTION INTRAVENOUS at 21:52

## 2024-01-19 ASSESSMENT — ACTIVITIES OF DAILY LIVING (ADL)
ADLS_ACUITY_SCORE: 37
ADLS_ACUITY_SCORE: 35

## 2024-01-19 NOTE — PROGRESS NOTES
Clinic Care Coordination Contact  [facility]/Voicemail    Clinical Data: Care Coordinator Outreach    Outreach Documentation Number of Outreach Attempt   11/20/2023  10:51 AM 1   12/28/2023  12:05 PM 1   1/12/2024  11:13 AM 2   1/19/2024  12:47 PM 1       Left message on patient's voicemail with call back information and requested return call.    Plan: Care Coordinator sent care coordination introduction letter on 5/20/2022 via [employer]. Care Coordinator will try to reach patient again in 1-2 business days.    Kinsey Muhammad, RN Care Coordination   [facility] Goldman  Email: [email or web address]  Phone: [phone]

## 2024-01-19 NOTE — ED PROVIDER NOTES
I was contacted by the lab overnight with reports of positive blood cultures.  Patient was seen in the emergency department yesterday for evaluation of fevers and concern for possible bacteremia in the setting of a chronic central line.  He has blood cultures growing 2 separate organisms: gram-negative bacilli and also gram-positive cocci in pairs and chains.  This is present in 2/3 of the cultures that were drawn yesterday.    I was able to discuss these results with his wife, Rose.  She is listed as his first contact in Epic and description authorizes disclosure of health information to her.  She is aware of his visit to the emergency department yesterday and was anticipating a possible positive culture result today.  Patient has already made plans to go to [facility] directly for further evaluation and management, as they would be able to potentially remove his central line and he could consult directly with infectious disease specialists.  I think this is very reasonable given his situation, but I informed Rose that they could certainly start that process here as well.     Sid Judd MD  01/19/24 0636

## 2024-01-20 ENCOUNTER — APPOINTMENT (OUTPATIENT)
Dept: GENERAL RADIOLOGY | Facility: CLINIC | Age: 54
DRG: 315 | End: 2024-01-20
Payer: COMMERCIAL

## 2024-01-20 LAB
ATRIAL RATE - MUSE: 81 BPM
DIASTOLIC BLOOD PRESSURE - MUSE: NORMAL MMHG
INTERPRETATION ECG - MUSE: NORMAL
P AXIS - MUSE: 48 DEGREES
PR INTERVAL - MUSE: 148 MS
PROCALCITONIN SERPL IA-MCNC: 8.52 NG/ML
QRS DURATION - MUSE: 92 MS
QT - MUSE: 376 MS
QTC - MUSE: 436 MS
R AXIS - MUSE: 9 DEGREES
SYSTOLIC BLOOD PRESSURE - MUSE: NORMAL MMHG
T AXIS - MUSE: 31 DEGREES
VENTRICULAR RATE- MUSE: 81 BPM

## 2024-01-20 PROCEDURE — 93010 ELECTROCARDIOGRAM REPORT: CPT | Performed by: EMERGENCY MEDICINE

## 2024-01-20 PROCEDURE — 99233 SBSQ HOSP IP/OBS HIGH 50: CPT | Performed by: STUDENT IN AN ORGANIZED HEALTH CARE EDUCATION/TRAINING PROGRAM

## 2024-01-20 PROCEDURE — 87040 BLOOD CULTURE FOR BACTERIA: CPT

## 2024-01-20 PROCEDURE — 36415 COLL VENOUS BLD VENIPUNCTURE: CPT

## 2024-01-20 PROCEDURE — 250N000013 HC RX MED GY IP 250 OP 250 PS 637

## 2024-01-20 PROCEDURE — 71045 X-RAY EXAM CHEST 1 VIEW: CPT | Mod: 26 | Performed by: RADIOLOGY

## 2024-01-20 PROCEDURE — 71045 X-RAY EXAM CHEST 1 VIEW: CPT

## 2024-01-20 PROCEDURE — 120N000002 HC R&B MED SURG/OB UMMC

## 2024-01-20 PROCEDURE — 250N000011 HC RX IP 250 OP 636

## 2024-01-20 PROCEDURE — 258N000003 HC RX IP 258 OP 636: Performed by: EMERGENCY MEDICINE

## 2024-01-20 PROCEDURE — 250N000013 HC RX MED GY IP 250 OP 250 PS 637: Performed by: EMERGENCY MEDICINE

## 2024-01-20 PROCEDURE — 250N000011 HC RX IP 250 OP 636: Performed by: EMERGENCY MEDICINE

## 2024-01-20 RX ORDER — ONDANSETRON 4 MG/1
8 TABLET, ORALLY DISINTEGRATING ORAL EVERY 8 HOURS PRN
Status: DISCONTINUED | OUTPATIENT
Start: 2024-01-20 | End: 2024-01-25 | Stop reason: HOSPADM

## 2024-01-20 RX ORDER — PREGABALIN 25 MG/1
50 CAPSULE ORAL 2 TIMES DAILY
Status: DISCONTINUED | OUTPATIENT
Start: 2024-01-20 | End: 2024-01-25 | Stop reason: HOSPADM

## 2024-01-20 RX ORDER — AMOXICILLIN 250 MG
1 CAPSULE ORAL 2 TIMES DAILY PRN
Status: DISCONTINUED | OUTPATIENT
Start: 2024-01-20 | End: 2024-01-25 | Stop reason: HOSPADM

## 2024-01-20 RX ORDER — ALPRAZOLAM 0.5 MG
0.5 TABLET ORAL 2 TIMES DAILY PRN
Status: DISCONTINUED | OUTPATIENT
Start: 2024-01-20 | End: 2024-01-25 | Stop reason: HOSPADM

## 2024-01-20 RX ORDER — POLYETHYLENE GLYCOL 3350 17 G/17G
17 POWDER, FOR SOLUTION ORAL DAILY
Status: DISCONTINUED | OUTPATIENT
Start: 2024-01-20 | End: 2024-01-25 | Stop reason: HOSPADM

## 2024-01-20 RX ORDER — AMOXICILLIN 250 MG
2 CAPSULE ORAL 2 TIMES DAILY PRN
Status: DISCONTINUED | OUTPATIENT
Start: 2024-01-20 | End: 2024-01-25 | Stop reason: HOSPADM

## 2024-01-20 RX ORDER — DEXTROAMPHETAMINE SACCHARATE, AMPHETAMINE ASPARTATE, DEXTROAMPHETAMINE SULFATE AND AMPHETAMINE SULFATE 2.5; 2.5; 2.5; 2.5 MG/1; MG/1; MG/1; MG/1
20 TABLET ORAL DAILY
Status: DISCONTINUED | OUTPATIENT
Start: 2024-01-20 | End: 2024-01-25 | Stop reason: HOSPADM

## 2024-01-20 RX ORDER — CEFEPIME HYDROCHLORIDE 2 G/1
2 INJECTION, POWDER, FOR SOLUTION INTRAVENOUS EVERY 8 HOURS
Status: DISCONTINUED | OUTPATIENT
Start: 2024-01-20 | End: 2024-01-25 | Stop reason: HOSPADM

## 2024-01-20 RX ORDER — CARVEDILOL 12.5 MG/1
12.5 TABLET ORAL 2 TIMES DAILY WITH MEALS
Status: DISCONTINUED | OUTPATIENT
Start: 2024-01-20 | End: 2024-01-21

## 2024-01-20 RX ORDER — ENOXAPARIN SODIUM 100 MG/ML
80 INJECTION SUBCUTANEOUS 2 TIMES DAILY
Status: DISCONTINUED | OUTPATIENT
Start: 2024-01-20 | End: 2024-01-25 | Stop reason: HOSPADM

## 2024-01-20 RX ORDER — ACETAMINOPHEN 325 MG/10.15ML
650 LIQUID ORAL EVERY 4 HOURS PRN
Status: DISCONTINUED | OUTPATIENT
Start: 2024-01-20 | End: 2024-01-25 | Stop reason: HOSPADM

## 2024-01-20 RX ORDER — CALCIUM CARBONATE 500 MG/1
1000 TABLET, CHEWABLE ORAL 4 TIMES DAILY PRN
Status: DISCONTINUED | OUTPATIENT
Start: 2024-01-20 | End: 2024-01-25 | Stop reason: HOSPADM

## 2024-01-20 RX ORDER — OXYCODONE HCL 5 MG/5 ML
5-10 SOLUTION, ORAL ORAL EVERY 4 HOURS PRN
Status: DISCONTINUED | OUTPATIENT
Start: 2024-01-20 | End: 2024-01-23

## 2024-01-20 RX ORDER — ALBUTEROL SULFATE 90 UG/1
2 AEROSOL, METERED RESPIRATORY (INHALATION) EVERY 6 HOURS PRN
Status: DISCONTINUED | OUTPATIENT
Start: 2024-01-20 | End: 2024-01-25 | Stop reason: HOSPADM

## 2024-01-20 RX ORDER — LIDOCAINE 40 MG/G
CREAM TOPICAL
Status: DISCONTINUED | OUTPATIENT
Start: 2024-01-20 | End: 2024-01-25 | Stop reason: HOSPADM

## 2024-01-20 RX ORDER — POLYETHYLENE GLYCOL 3350 17 G/17G
17 POWDER, FOR SOLUTION ORAL PRN
COMMUNITY

## 2024-01-20 RX ORDER — LIDOCAINE 4 G/G
1 PATCH TOPICAL DAILY PRN
Status: DISCONTINUED | OUTPATIENT
Start: 2024-01-20 | End: 2024-01-25 | Stop reason: HOSPADM

## 2024-01-20 RX ORDER — PANTOPRAZOLE SODIUM 40 MG/1
40 TABLET, DELAYED RELEASE ORAL DAILY
Status: DISCONTINUED | OUTPATIENT
Start: 2024-01-20 | End: 2024-01-25 | Stop reason: HOSPADM

## 2024-01-20 RX ORDER — SUCRALFATE ORAL 1 G/10ML
1 SUSPENSION ORAL 4 TIMES DAILY PRN
Status: DISCONTINUED | OUTPATIENT
Start: 2024-01-20 | End: 2024-01-25 | Stop reason: HOSPADM

## 2024-01-20 RX ORDER — FENTANYL 25 UG/1
25 PATCH TRANSDERMAL
Status: DISCONTINUED | OUTPATIENT
Start: 2024-01-20 | End: 2024-01-22

## 2024-01-20 RX ORDER — NYSTATIN 100000 U/G
CREAM TOPICAL 2 TIMES DAILY
Status: DISCONTINUED | OUTPATIENT
Start: 2024-01-20 | End: 2024-01-25 | Stop reason: HOSPADM

## 2024-01-20 RX ADMIN — OXYCODONE HYDROCHLORIDE 10 MG: 5 SOLUTION ORAL at 15:17

## 2024-01-20 RX ADMIN — ENOXAPARIN SODIUM 80 MG: 80 INJECTION SUBCUTANEOUS at 19:45

## 2024-01-20 RX ADMIN — OXYCODONE HYDROCHLORIDE 10 MG: 5 SOLUTION ORAL at 23:45

## 2024-01-20 RX ADMIN — ALPRAZOLAM 0.5 MG: 0.5 TABLET ORAL at 10:06

## 2024-01-20 RX ADMIN — CEFEPIME 2 G: 2 INJECTION, POWDER, FOR SOLUTION INTRAVENOUS at 07:20

## 2024-01-20 RX ADMIN — ONDANSETRON 8 MG: 8 TABLET, ORALLY DISINTEGRATING ORAL at 15:17

## 2024-01-20 RX ADMIN — OXYCODONE HYDROCHLORIDE 10 MG: 5 SOLUTION ORAL at 01:17

## 2024-01-20 RX ADMIN — DEXTROAMPHETAMINE SACCHARATE, AMPHETAMINE ASPARTATE, DEXTROAMPHETAMINE SULFATE AND AMPHETAMINE SULFATE 20 MG: 2.5; 2.5; 2.5; 2.5 TABLET ORAL at 08:01

## 2024-01-20 RX ADMIN — ACETAMINOPHEN 650 MG: 325 SOLUTION ORAL at 19:43

## 2024-01-20 RX ADMIN — ALPRAZOLAM 0.5 MG: 0.5 TABLET ORAL at 01:17

## 2024-01-20 RX ADMIN — OXYCODONE HYDROCHLORIDE 10 MG: 5 SOLUTION ORAL at 05:31

## 2024-01-20 RX ADMIN — DIPHENHYDRAMINE HYDROCHLORIDE 25 MG: 25 CAPSULE ORAL at 10:06

## 2024-01-20 RX ADMIN — ONDANSETRON 8 MG: 8 TABLET, ORALLY DISINTEGRATING ORAL at 23:45

## 2024-01-20 RX ADMIN — PREGABALIN 50 MG: 50 CAPSULE ORAL at 19:44

## 2024-01-20 RX ADMIN — VANCOMYCIN HYDROCHLORIDE 1250 MG: 10 INJECTION, POWDER, LYOPHILIZED, FOR SOLUTION INTRAVENOUS at 11:48

## 2024-01-20 RX ADMIN — PREGABALIN 50 MG: 50 CAPSULE ORAL at 08:01

## 2024-01-20 RX ADMIN — CEFEPIME 2 G: 2 INJECTION, POWDER, FOR SOLUTION INTRAVENOUS at 22:13

## 2024-01-20 RX ADMIN — NYSTATIN: 100000 CREAM TOPICAL at 08:01

## 2024-01-20 RX ADMIN — ENOXAPARIN SODIUM 80 MG: 80 INJECTION SUBCUTANEOUS at 08:05

## 2024-01-20 RX ADMIN — FENTANYL 1 PATCH: 25 PATCH TRANSDERMAL at 08:01

## 2024-01-20 RX ADMIN — ACETAMINOPHEN 650 MG: 325 SOLUTION ORAL at 05:31

## 2024-01-20 RX ADMIN — VANCOMYCIN HYDROCHLORIDE 1250 MG: 10 INJECTION, POWDER, LYOPHILIZED, FOR SOLUTION INTRAVENOUS at 23:37

## 2024-01-20 RX ADMIN — DIPHENHYDRAMINE HYDROCHLORIDE 25 MG: 50 INJECTION, SOLUTION INTRAMUSCULAR; INTRAVENOUS at 22:59

## 2024-01-20 RX ADMIN — NYSTATIN: 100000 CREAM TOPICAL at 19:45

## 2024-01-20 RX ADMIN — OXYCODONE HYDROCHLORIDE 10 MG: 5 SOLUTION ORAL at 19:43

## 2024-01-20 ASSESSMENT — ACTIVITIES OF DAILY LIVING (ADL)
ADLS_ACUITY_SCORE: 37
DIFFICULTY_COMMUNICATING: NO
ADLS_ACUITY_SCORE: 37
ADLS_ACUITY_SCORE: 28
DIFFICULTY_EATING/SWALLOWING: YES
ADLS_ACUITY_SCORE: 37
EQUIPMENT_CURRENTLY_USED_AT_HOME: CANE, STRAIGHT
DOING_ERRANDS_INDEPENDENTLY_DIFFICULTY: NO
ADLS_ACUITY_SCORE: 37
WEAR_GLASSES_OR_BLIND: NO
HEARING_DIFFICULTY_OR_DEAF: NO
TOILETING_ISSUES: NO
ADLS_ACUITY_SCORE: 37
ADLS_ACUITY_SCORE: 37
CHANGE_IN_FUNCTIONAL_STATUS_SINCE_ONSET_OF_CURRENT_ILLNESS/INJURY: NO
WALKING_OR_CLIMBING_STAIRS_DIFFICULTY: NO
EATING/SWALLOWING: SWALLOWING LIQUIDS;SWALLOWING SOLID FOOD
CONCENTRATING,_REMEMBERING_OR_MAKING_DECISIONS_DIFFICULTY: NO
FALL_HISTORY_WITHIN_LAST_SIX_MONTHS: NO
DRESSING/BATHING_DIFFICULTY: NO
ADLS_ACUITY_SCORE: 37

## 2024-01-20 NOTE — ED TRIAGE NOTES
Pt arrives in the ED with c/o of positive blood cultures. Pt was recently seen at [facility] ED yesterday for issues with his azevedo and fevers. VSS

## 2024-01-20 NOTE — MEDICATION SCRIBE - ADMISSION MEDICATION HISTORY
"Medication Scribe Admission Medication History    Admission medication history is complete. The information provided in this note is only as accurate as the sources available at the time of the update.    Information Source(s): Patient via in-person    Pertinent Information: Spoke with patient in person and completed medication hx.    Changes made to PTA medication list:  Added: None  Deleted: None  Changed: Miralax 17 GM/Dose Powder: Take 17 g daily >> Miralax 17GM/Dose Powder: Take 17 g PRN             Vitamin D2 : Take 1 capsule once a week >> Vitamin D2: Take 1 capsule once a week (Sunday)        Allergies reviewed with patient and updates made in EHR: no    Medication History Completed By: Yana Sheets 1/20/2024 9:15 AM    PTA Med List   Medication Sig Last Dose    albuterol (VENTOLIN HFA) 108 (90 Base) MCG/ACT inhaler Inhale 2 puffs into the lungs every 6 hours as needed 1/19/2024 at AM    ALPRAZolam (XANAX) 0.5 MG tablet TAKE ONE TABLET BY MOUTH TWICE A DAY AS NEEDED FOR SLEEP OR ANXIETY 1/19/2024 at AM    amphetamine-dextroamphetamine (ADDERALL) 20 MG tablet TAKE ONE TABLET BY MOUTH ONCE DAILY 1/19/2024 at AM    carvedilol (COREG) 6.25 MG tablet TAKE 2 TABLETS (12.5 MG) BY MOUTH 2 TIMES DAILY WITH MEALS 1/19/2024 at AM    cyanocobalamin (CYANOCOBALAMIN) 1000 MCG/ML injection INJECT 1 ML INTO THE MUSCLE EVERY 30 DAYS Past Month at 12/2023    enoxaparin ANTICOAGULANT (LOVENOX) 80 MG/0.8ML syringe INJECT THE CONTENTS OF ONE SYRINGE (80MG) UNDER THE SKIN TWO TIMES A DAY 1/19/2024 at AM    [facility]NAGED PATIENT Contact [facility] for patient specific medication information at [phone] on admission and discharge from the hospital.  Phones are answered 24 hours a day 7 days a week 365 days a year.    Providers - Choose \"CONTINUE HOME MED (no script)\" at discharge if patient treatment with home infusion will continue.     fentaNYL (DURAGESIC) 25 mcg/hr 72 hr patch Place 1 patch " "onto the skin every 48 hours Fill 01/26/24 and start 01/28/24. 30 day supply for chronic pain. 1/19/2024    Lidocaine (LIDOCARE) 4 % Patch Place 1 patch onto the skin daily as needed for moderate pain To prevent lidocaine toxicity, patient should be patch free for 12 hrs daily. More than a month    lipids plant base (CLINOLIPID) 20 % infusion Inject 250 mLs into the vein every 24 hours     naloxone (NARCAN) 4 MG/0.1ML nasal spray Spray 1 spray (4 mg) into one nostril alternating nostrils as needed for opioid reversal every 2-3 minutes until assistance arrives Unknown    nystatin (MYCOSTATIN) 796477 UNIT/GM external cream Apply topically 2 times daily 1/19/2024 at AM    ondansetron (ZOFRAN ODT) 8 MG ODT tab DISSOLVE ONE TABLET BY MOUTH EVERY 8 HOURS AS NEEDED FOR NAUSEA 1/19/2024 at PM    oxyCODONE (ROXICODONE) 5 MG/5ML solution Take 5-10 mLs (5-10 mg) by mouth every 4 hours as needed for moderate to severe pain Max of 40mg/day. Fill 01/26/24 and start 01/28/24. 30 day supply for chronic pain. 1/19/2024 at AM    pantoprazole (PROTONIX) 40 MG EC tablet TAKE 1 TABLET (40 MG) BY MOUTH DAILY 1/19/2024 at AM    polyethylene glycol (MIRALAX) 17 GM/Dose powder Take 17 g by mouth as needed for constipation Past Month    pregabalin (LYRICA) 25 MG capsule Take 25mg at bedtime for 7 days, then 25mg twice daily for 7 days, then 25mg in the AM and 50mg at bedtime for 7 days, then take 50mg twice daily. If side effects, then reduce to last tolerable dosage. 1/19/2024 at AM    sucralfate (CARAFATE) 1 GM/10ML suspension TAKE 10MLS  BY MOUTH FOUR TIMES A DAY AS NEEDED 1/19/2024 at AM    Syringe/Needle, Disp, (B-D ECLIPSE SYRINGE) 27G X 1/2\" 1 ML MISC 1 Device every 30 days        "

## 2024-01-20 NOTE — PROGRESS NOTES
Lab called and pt has blood cultures positive for 2 organisms.venous at 2100 yesterday,gram neg bacilli, gram positive cocci in pairs and chains.Patient will call when needed let peyman montgomery know.

## 2024-01-20 NOTE — PROGRESS NOTES
Lab called and blood cultures from the1/19/24 at 2100 from periph draw were gram neg bacilli and gram poss cocci in pairs and chains.Will let MD cross cover know through page.

## 2024-01-20 NOTE — PROGRESS NOTES
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    #Bacteremia, gram pos cocci and gram negative bacilli  #History of recurrent CLABSI from indwelling Cm line  -Has had multiple episodes of Klebsiella bactermia requiring cm exchange, last replaced 11/2023  -PTA felt sick for a week, had fever to 103.2F. Went to [place] ED on 1/18, blood cultures positive  -Patient presented to [facility] on 1/[facility] for further evaluation and management  -Hemodynamically stable, normal lactate, WBC 11.3  -UA pending  -Empirically covered on cefepime and vancomycin  -Infectious disease consult pending    #Chronic TPN  #Short gut syndrome  #Severe malnutrition  #J tube (used for venting)  #Reflux   - PPN when able to start (runs for 12 hours); hold lipids if insufficient IV access  - Bariatric diet, encourage PO intake for hydration and glucose  - Continue PTA pantoprazole   -Nutrition consult     Chronic pain  - PTA fentanyl patch  - PTA oxycodone      Anemia, chronic: Monitoring  Tobacco use disorder: Defers supportive aids  Anxiety: PTA lorazepam at bedtime  ADHD: PTA Adderall  Paroxysmal AFib: Holding PTA carvedilol           Diet:  Bariatric diet, holding TPN  DVT Prophylaxis: Enoxaparin (Lovenox) SQ  Sanchez Catheter: Not present  Lines: PRESENT             Cardiac Monitoring: None  Code Status: Full Code      Clinically Significant Risk Factors Present on Admission               # Drug Induced Coagulation Defect: home medication list  includes an anticoagulant medication             # Financial/Environmental Concerns:           Disposition Plan      Expected Discharge Date: 01/21/2024                    Francis Marsh MD  Hospitalist Service, GOLD TEAM 8  [facility]  Securely message with [employer] (more info)  Text page via [facility] Paging/Directory   See signed in provider for up to date coverage information  ______________________________________________________________________    Interval History   -Admitted overnight  -While in the ED he was frustrated that he cannot leave to go smoke. Initially requested to be transferred to another facility. After learning he could not be transferred he agreed to be admitted.   -Feels well overall, came in due to fevers at home    Physical Exam   Vital Signs: Temp: 99.8  F (37.7  C) Temp src: Oral BP: 107/59 Pulse: 70   Resp: 18 SpO2: 98 % O2 Device: None (Room air)    Weight: 0 lbs 0 oz  Exam  Gen: Awake and alert, appears comfortable, appears stated age.  HEENT: NCAT, sclerae anicteric.  CV: Regular rhythm and rate, S1/S2, extremities warm and well perfused, no lower extremity edema.  Chest: Cm on left side of chest. No surrounding erythema or drainage.  Pulm: Normal work of breathing, lungs clear to auscultation, no crackles or wheezing.  GI: Nontender, nondistended.  Skin: Warm, dry, no jaundice.  Neuro: AOx3, speech normal, moves all extremities symmetrically.    Medical Decision Making       55 MINUTES SPENT BY ME on the date of service doing chart review, history, exam, documentation & further activities per the note.      Data     I have personally reviewed the following data over the past 24 hrs:    11.3 (H)  \   9.9 (L)   / 313     142 108 (H) 12.0 /  104 (H)   4.3 26 0.85 \     ALT: 10 AST: 17 AP: 78 TBILI: 0.2   ALB: 3.9 TOT PROTEIN: 7.3 LIPASE: N/A     Procal: 8.52 (HH) CRP: N/A Lactic Acid: 1.2         Imaging results reviewed over the past 24 hrs:    Recent Results (from the past 24 hour(s))   XR Chest Port 1 View    Narrative    EXAM: XR CHEST PORT 1 VIEW  LOCATION: [facility]  DATE: 1/20/2024    INDICATION: septic with azevedo in place  COMPARISON: 11/14/2023      Impression    IMPRESSION: Negative chest. Left subclavian catheter terminates at the superior cavoatrial junction.

## 2024-01-20 NOTE — PROGRESS NOTES
"CLINICAL NUTRITION SERVICES - ASSESSMENT NOTE     Nutrition Prescription    RECOMMENDATIONS FOR MDs/PROVIDERS TO ORDER:  When plan to start PPN and/or TPN, please send consult Pharmacy/Nutrition to start and manage TPN and specify line type in consult.    Recommendations already ordered by Registered Dietitian (RD):  None at this time     Future/Additional Recommendations:  If requires PPN, recommend peripheral Clinimix @ 83 mL/hr (1992 mL/day) + 250 mL 20% IV lipids 3 days/week to provide 894 kcal (10 kcal/kg), 100 g dex (GIR 0.8), 85 g AA (1 g/kg), and 24% kcal from fat per dosing weight 82 kg  Monitor weight trends vs need to adjust TPN provisions     REASON FOR ASSESSMENT  Parker Acevedo is a/an 53 year old male assessed by the dietitian for Provider Order - \"Holding home TPN. Monitor nutrition while inpatient. Anticipate will need PPN in a few days\"    NUTRITION HISTORY  Per provider note yesterday pt last received TPN 1/18 (normal 12-hr run) and eats by mouth predominantly for comfort.    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9  Weight History: overall stable since 1/11/23  Wt Readings from Last 10 Encounters:   01/18/24 81.6 kg (180 lb)   11/17/23 85.7 kg (189 lb)   11/11/23 82.1 kg (181 lb)   10/19/23 89.7 kg (197 lb 11.2 oz)   09/29/23 88.5 kg (195 lb)   09/19/23 89.8 kg (198 lb)   09/18/23 90.3 kg (199 lb)   09/05/23 89.8 kg (198 lb)   08/06/23 85 kg (187 lb 6.3 oz)   08/04/23 86.6 kg (191 lb)      Dosing Weight: 82 kg (actual)    ASSESSED NUTRITION NEEDS  Estimated Energy Needs: 6553-1395 kcals/day (20 - 25 kcals/kg)  Justification: Maintenance with TPN, aim higher end for PO+PN  Estimated Protein Needs: 82-98 grams protein/day (1 - 1.2 grams of pro/kg)  Justification: Maintenance  Estimated Fluid Needs: 1 mL/kcal  Justification: Maintenance, or other per provider pending fluid status    PHYSICAL FINDINGS  See malnutrition section below.    MALNUTRITION  % Intake: Suspect decreased intake does not meet criteria  % Weight Loss: None noted  Subcutaneous Fat Loss: Unable to assess  Muscle Loss: Unable to assess  Fluid Accumulation/Edema: None noted  Malnutrition Diagnosis: Unable to determine due to pt not available during visit    NUTRITION DIAGNOSIS  Inadequate protein-energy intake related to TPN on hold 2/2 bacteremia and limited PO intake absorption 2/2 short gut as evidenced by TPN on hold x 2 days      INTERVENTIONS  Implementation  Nutrition Education: Unable to complete due to pt not available   Collaboration with other providers: obtained pt's home TPN recipe from [facility] pharmacist    Goals  Nutrition support (PPN vs TPN) within 2-3 days.     Monitoring/Evaluation  Progress toward goals will be monitored and evaluated per protocol.   Steph Tijerina, RD, , LD  Weekday Pager: [phone]  Weekday Units covered: 5A (non-Heme Onc pts) and 7B ([id number])  Weekend/Holiday RD Pager: " [phone]

## 2024-01-20 NOTE — ED PROVIDER NOTES
[place] EMERGENCY DEPARTMENT ([facility])    1/19/24       ED PROVIDER NOTE      History     Chief Complaint   Patient presents with    Abnormal Labs     HPI  Parker Acevedo is a 53 year old male with a history of bacteremia secondary to his Cm catheter, status post bariatric surgery, anemia, chronic pain, and iron deficiency anemia presents to the ED for abnormal labs. Patient was referred for positive blood cultures that was drawn yesterday at [place] ED. At the time when he was seen in the ER, he was feeling fine. Since leaving the ER, he spiked a fever to 102 overnight. He has had nausea but denies vomiting. He had a few episodes of diarrhea. He is feeling somewhat lethargic and weak. No other acute complaints.     Per chart review, patient seen in the [place] ED yesterday (1/18/24) for fever and issues with his cm. Patient also has positive blood cultures.     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    Complication of anesthesia     Difficulty swallowing     Gastric ulcer, unspecified as acute or chronic, without mention of hemorrhage, perforation, or obstruction     Gastro-oesophageal reflux disease     Head injury     Hiatal hernia     Other bladder disorder     Other chronic pain     PONV (postoperative nausea and vomiting)     Severe malnutrition (H24)     TPN    Short gut syndrome     Tobacco abuse      Past Surgical History:   Procedure Laterality Date    AMPUTATION      APPENDECTOMY      BACK SURGERY  11/3/2014    curve in the spine    BIOPSY LYMPH NODE CERVICAL N/A 2/20/2015    Procedure: BIOPSY LYMPH NODE CERVICAL;  Surgeon: Baron Scanlon MD;  Location: [facility]    CHOLECYSTECTOMY       COLONOSCOPY N/A 7/14/2021    Procedure: COLONOSCOPY;  Surgeon: Jimbo Estrada MD;  Location: [facility] OR    COLONOSCOPY N/A 4/13/2022    Procedure: COLONOSCOPY;  Surgeon: Jimbo Estrada MD;  Location: [facility] OR    COLONOSCOPY N/A 9/19/2023    Procedure: Colonoscopy;  Surgeon: Jimbo Estrada MD;  Location: [facility] OR    DISCECTOMY, FUSION CERVICAL ANTERIOR ONE LEVEL, COMBINED N/A 2/15/2017    Procedure: COMBINED DISCECTOMY, FUSION CERVICAL ANTERIOR ONE LEVEL;  Surgeon: Darren Campos MD;  Location: [facility]    ENDOSCOPIC INSERTION TUBE GASTROSTOMY  9/9/2013    Procedure: ENDOSCOPIC INSERTION TUBE GASTROSTOMY;;  Surgeon: Francis Vyas MD;  [facility]    ENDOSCOPIC ULTRASOUND UPPER GASTROINTESTINAL TRACT (GI)  4/29/2011    Procedure:ENDOSCOPIC ULTRASOUND UPPER GASTROINTESTINAL TRACT (GI); Both Procedures done Conjointly; Surgeon:NEREIDA HOUSER; [facility]    ENDOSCOPIC ULTRASOUND UPPER GASTROINTESTINAL TRACT (GI)  9/9/2013    Procedure: ENDOSCOPIC ULTRASOUND UPPER GASTROINTESTINAL TRACT (GI);  Endoscopic Ultrasound Guide Gastrostomy Tube Placement  C-arm;  Surgeon: Noe Lizarraga MD;  [facility]    ENDOSCOPIC ULTRASOUND UPPER GASTROINTESTINAL TRACT (GI) N/A 2/24/2021    Procedure: ENDOSCOPIC ULTRASOUND, ESOPHAGOSCOPY / UPPER GASTROINTESTINAL TRACT (GI), esophagastrogastroduodenoscopy;  Surgeon: Berny Bach MD;  [facility]    ENDOSCOPY  03/25/11    EGD, [facility]    ENDOSCOPY  08/04/09    Upper Endoscopy, [facility]    ENDOSCOPY  01/05/09    Upper Endoscopy, [facility]    ESOPHAGOSCOPY, GASTROSCOPY, DUODENOSCOPY (EGD), COMBINED  4/20/2011    Procedure:COMBINED ESOPHAGOSCOPY, GASTROSCOPY, DUODENOSCOPY (EGD); Surgeon:FRANCIS VYAS; [facility]    ESOPHAGOSCOPY, GASTROSCOPY, DUODENOSCOPY (EGD), COMBINED  6/15/2011    Procedure:COMBINED ESOPHAGOSCOPY, GASTROSCOPY, DUODENOSCOPY (EGD); Surgeon:FRANCIS VYAS;  Location: GI    ESOPHAGOSCOPY, GASTROSCOPY, DUODENOSCOPY (EGD), COMBINED  6/12/2013    Procedure: COMBINED ESOPHAGOSCOPY, GASTROSCOPY, DUODENOSCOPY (EGD);;  Surgeon: Francis Vyas MD;  Location:  GI    ESOPHAGOSCOPY, GASTROSCOPY, DUODENOSCOPY (EGD), COMBINED  11/22/2013    Procedure: COMBINED ESOPHAGOSCOPY, GASTROSCOPY, DUODENOSCOPY (EGD);;  Surgeon: Francis Vyas MD;  [facility]    ESOPHAGOSCOPY, GASTROSCOPY, DUODENOSCOPY (EGD), COMBINED  4/30/2014    Procedure: COMBINED ESOPHAGOSCOPY, GASTROSCOPY, DUODENOSCOPY (EGD);  Surgeon: Francis Vyas MD;  Location:  GI    ESOPHAGOSCOPY, GASTROSCOPY, DUODENOSCOPY (EGD), COMBINED N/A 2/20/2015    Procedure: COMBINED ESOPHAGOSCOPY, GASTROSCOPY, DUODENOSCOPY (EGD), BIOPSY SINGLE OR MULTIPLE;  Surgeon: Baron Scanlon MD;  Location:  OR    ESOPHAGOSCOPY, GASTROSCOPY, DUODENOSCOPY (EGD), COMBINED N/A 9/30/2015    Procedure: COMBINED ESOPHAGOSCOPY, GASTROSCOPY, DUODENOSCOPY (EGD);  Surgeon: Francis Vyas MD;  Location:  GI    ESOPHAGOSCOPY, GASTROSCOPY, DUODENOSCOPY (EGD), COMBINED N/A 10/3/2019    Procedure: Upper Endoscopy;  Surgeon: Clif Morrow MD;  [facility]    GASTRECTOMY  6/22/2012    Procedure: GASTRECTOMY;  Open Approach, Excise Ulcers,Partial Gastrectomy, Esophagojejunostomy, Hiatal Hernia Repair, Extensive Lysis of Adhesions and Esaphagogastrodudenoscopy.;  Surgeon: Francis Vyas MD;  [facility]    GASTROJEJUNOSTOMY  08/26/09    Extensice enterolysis, partial resect. jejunum, part. resect gastric pouch, gastrojejunostomy anastomosis    HC ESOPH/GAS REFLUX TEST W NASAL IMPED ELECTRODE  8/5/2013    Procedure: ESOPHAGEAL IMPEDENCE FUNCTION TEST 1 HOUR OR LESS;  Surgeon: Halie Lang MD;  Location:  GI    HEAD & NECK SURGERY  2/15/2017    C5-C6    HERNIA REPAIR  2006    Umbilical hernia    HERNIORRHAPHY HIATAL  6/22/2012    Procedure: HERNIORRHAPHY HIATAL;;  Surgeon: Francis Vyas  MD Lg;  [facility]    HERNIORRHAPHY INGUINAL  11/22/2013    Procedure: HERNIORRHAPHY INGUINAL;;  Surgeon: Francis Vyas MD;  [facility]    INSERT PICC LINE Right 12/19/2019    Procedure: Picc Placement;  Surgeon: Per Dumont PA-C;  [facility]    INSERT PICC LINE Right 2/21/2020    Procedure: INSERTION, PICC;  Surgeon: Per Dumont PA-C;  [facility]    INSERT PORT VASCULAR ACCESS Right 12/19/2017    Procedure: INSERT PORT VASCULAR ACCESS;  Right Chest Port Placement ;  Surgeon: Lisandro Alejandro PA-C;  [facility]    INSERT PORT VASCULAR ACCESS Right 8/2/2018    Procedure: INSERT PORT VASCULAR ACCESS;  Place single lumen tunneled central venous access catheter;  Surgeon: Guy Jamil PA-C;  [facility]    IR CVC TUNNEL PLACEMENT > 5 YRS OF AGE  8/7/2019    IR CVC TUNNEL PLACEMENT > 5 YRS OF AGE  4/14/2020    IR CVC TUNNEL PLACEMENT > 5 YRS OF AGE  8/3/2020    IR CVC TUNNEL PLACEMENT > 5 YRS OF AGE  9/4/2020    IR CVC TUNNEL PLACEMENT > 5 YRS OF AGE  2/5/2021    IR CVC TUNNEL PLACEMENT > 5 YRS OF AGE  3/23/2021    IR CVC TUNNEL PLACEMENT > 5 YRS OF AGE  1/13/2022    IR CVC TUNNEL PLACEMENT > 5 YRS OF AGE  5/12/2022    IR CVC TUNNEL PLACEMENT > 5 YRS OF AGE  7/13/2022    IR CVC TUNNEL PLACEMENT > 5 YRS OF AGE  7/10/2023    IR CVC TUNNEL PLACEMENT > 5 YRS OF AGE  11/17/2023    IR CVC TUNNEL REMOVAL LEFT  6/7/2023    IR CVC TUNNEL REMOVAL LEFT  11/14/2023    IR CVC TUNNEL REMOVAL RIGHT  10/1/2019    IR CVC TUNNEL REMOVAL RIGHT  7/30/2020    IR CVC TUNNEL REMOVAL RIGHT  9/2/2020    IR CVC TUNNEL REMOVAL RIGHT  2/3/2021    IR CVC TUNNEL REMOVAL RIGHT  3/19/2021    IR CVC TUNNEL REMOVAL RIGHT  1/10/2022    IR CVC TUNNEL REMOVAL RIGHT  5/9/2022    IR CVC TUNNEL REMOVAL RIGHT  7/8/2022    IR CVC TUNNEL REVISION LEFT  8/10/2022    IR CVC TUNNEL REVISION LEFT  9/19/2023    IR CVC TUNNEL REVISION RIGHT  5/7/2021    IR FOLLOW UP VISIT OUTPATIENT  8/7/2019     IR PICC EXCHANGE LEFT  6/8/2023    IR PICC PLACEMENT > 5 YRS OF AGE  3/7/2019    IR PICC PLACEMENT > 5 YRS OF AGE  12/19/2019    IR PICC PLACEMENT > 5 YRS OF AGE  2/21/2020    IR PICC PLACEMENT > 5 YRS OF AGE  6/7/2023    LAPAROTOMY EXPLORATORY  11/22/2013    Procedure: LAPAROTOMY EXPLORATORY;  Exploratory Laparotomy, Upper Endoscopy, Left Inguinal Hernia Repair;  Surgeon: Francis Vyas MD;  [facility]    LAPAROTOMY EXPLORATORY N/A 9/30/2023    Procedure: Laparotomy exploratory, reduction of intussusception, resection of small bowel with primary anastamosis, upper endoscopy;  Surgeon: Delvis Mecrado MD;  [facility]    ORTHOPEDIC SURGERY      PICC INSERTION Right 03/16/2017    5fr DL BioFlo PICC, 42cm (3cm external) in the R medial brachial vein w/ tip in the SVC RA junction.    PICC INSERTION Left 09/23/2017    5fr DL BioFlo PICC, 45cm (1cm external) in the L basilic vein w/ tip in the SVC RA junction.    PICC INSERTION Right 05/16/2019    5Fr - 43cm, Medial brachial vein, low SVC    PICC INSERTION Right 10/02/2019    5Fr - 43cm (2cm external), basilic vein, low SVC    SHAYLEE EN Y BOWEL  2003    SOFT TISSUE SURGERY      THORACIC SURGERY      TONSILLECTOMY      TRANSESOPHAGEAL ECHOCARDIOGRAM INTRAOPERATIVE N/A 1/8/2019    Procedure: TRANSESOPHAGEAL ECHOCARDIOGRAM INTRAOPERATIVE;  Surgeon: GENERIC ANESTHESIA PROVIDER;  [facility]    TRANSESOPHAGEAL ECHOCARDIOGRAM INTRAOPERATIVE N/A 7/7/2023    Procedure: Transesophageal echocardiogram in the OR;  Surgeon: GENERIC ANESTHESIA PROVIDER;  [facility]    TRANSESOPHAGEAL ECHOCARDIOGRAM INTRAOPERATIVE N/A 11/17/2023    Procedure: ECHOCARDIOGRAM,TRANSESOPHAGEAL,WITH ANESTHESIA;  Surgeon: GENERIC ANESTHESIA PROVIDER;  [facility]    ZZC GASTRIC BYPASS,OBESE<100CM SHAYLEE-EN-Y  2002    lost 300 pounds     albuterol (VENTOLIN HFA) 108 (90 Base) MCG/ACT inhaler  ALPRAZolam (XANAX) 0.5 MG tablet  amphetamine-dextroamphetamine (ADDERALL) 20 MG  "tablet  carvedilol (COREG) 6.25 MG tablet  cyanocobalamin (CYANOCOBALAMIN) 1000 MCG/ML injection  enoxaparin ANTICOAGULANT (LOVENOX) 80 MG/0.8ML syringe  [facility]  fentaNYL (DURAGESIC) 25 mcg/hr 72 hr patch  Lidocaine (LIDOCARE) 4 % Patch  lipids plant base (CLINOLIPID) 20 % infusion  naloxone (NARCAN) 4 MG/0.1ML nasal spray  nystatin (MYCOSTATIN) 430791 UNIT/GM external cream  ondansetron (ZOFRAN ODT) 8 MG ODT tab  oxyCODONE (ROXICODONE) 5 MG/5ML solution  pantoprazole (PROTONIX) 40 MG EC tablet  polyethylene glycol (MIRALAX) 17 GM/Dose powder  pregabalin (LYRICA) 25 MG capsule  sucralfate (CARAFATE) 1 GM/10ML suspension  Syringe/Needle, Disp, (B-D ECLIPSE SYRINGE) 27G X 1/2\" 1 ML MISC  vitamin D2 (ERGOCALCIFEROL) 54787 units (1250 mcg) capsule      Allergies   Allergen Reactions    Bactrim [Sulfamethoxazole-Trimethoprim] Rash    Penicillins Anaphylaxis     Please see Antimicrobial Management Team allergy assessment note 10/10/2018. Patient reported tolerating amoxicillin.  Tolerating cefepime and ceftriaxone without reaction 6/23    Ertapenem Nausea and Vomiting    Doxycycline Rash    Vancomycin Rash     Rash after receiving vancomycin 3/28/16 (infusion reaction?). Tolerated with slower infusion and diphenhydramine premed.  Tolerated 1250mg over 90minutes 7/2023.     Family History  Family History   Problem Relation Age of Onset    Gastrointestinal Disease Mother         Crohns disease    Anxiety Disorder Mother     Thyroid Disease Mother         Grave's disease    Cancer Father         ear cancer-skin cancer/melanoma    Breast Cancer Maternal Grandmother     Macular Degeneration Maternal Grandfather     Anxiety Disorder Sister     Diabetes Maternal Uncle     Breast Cancer Other     Hypertension No family hx of     Hyperlipidemia No family hx of     Cerebrovascular Disease No family hx of     Prostate Cancer No family hx of     Depression No family hx of     Anesthesia Reaction No " family hx of     Asthma No family hx of     Osteoporosis No family hx of     Genetic Disorder No family hx of     Obesity No family hx of     Mental Illness No family hx of     Substance Abuse No family hx of     Glaucoma No family hx of      Social History   Social History     Tobacco Use    Smoking status: Light Smoker     Packs/day: 0.50     Years: 3.00     Additional pack years: 0.00     Total pack years: 1.50     Types: Cigarettes    Smokeless tobacco: Never    Tobacco comments:     2/4/2021    smokes 3 cigarettes/day   Vaping Use    Vaping Use: Never used   Substance Use Topics    Alcohol use: No     Comment: quit 2002    Drug use: No         A medically appropriate review of systems was performed with pertinent positives and negatives noted in the HPI, and all other systems negative.    Physical Exam   BP: 103/67  Pulse: 80  Temp: 98.4  F (36.9  C)  Resp: 18  SpO2: 100 %  Physical Exam  Vitals and nursing note reviewed.   Constitutional:       General: He is not in acute distress.     Appearance: He is well-developed. He is not diaphoretic.   HENT:      Head: Normocephalic and atraumatic.      Nose: No congestion.      Mouth/Throat:      Mouth: Mucous membranes are moist.   Eyes:      General: No scleral icterus.     Conjunctiva/sclera: Conjunctivae normal.   Cardiovascular:      Rate and Rhythm: Normal rate.   Pulmonary:      Effort: Pulmonary effort is normal.   Abdominal:      General: Abdomen is flat.   Musculoskeletal:      Cervical back: Normal range of motion and neck supple.   Skin:     General: Skin is warm and dry.      Capillary Refill: Capillary refill takes less than 2 seconds.      Findings: No rash.   Neurological:      Mental Status: He is alert and oriented to person, place, and time.             ED Course, Procedures, & Data      Procedures            EKG Interpretation:      Interpreted by Jw Alvarez MD  Time reviewed: per Epic  Symptoms at time of EKG: lethargy   Rhythm: normal sinus    Rate: normal  Axis: normal  Ectopy: none  Conduction: normal  ST Segments/ T Waves: No ST-T wave changes  Q Waves: none  Comparison to prior: Unchanged    Clinical Impression: normal EKG                 No results found for any visits on 01/19/24.  Medications - No data to display  Labs Ordered and Resulted from Time of ED Arrival to Time of ED Departure - No data to display  No orders to display          Critical care was not performed.     Medical Decision Making  The patient's presentation was of high complexity (an acute health issue posing potential threat to life or bodily function).    The patient's evaluation involved:  ordering and/or review of 3+ test(s) in this encounter (see separate area of note for details)    The patient's management necessitated high risk (a decision regarding hospitalization).    Assessment & Plan      53 year old male with a history of bacteremia secondary to his Cm catheter, status post bariatric surgery, anemia, chronic pain, and iron deficiency anemia presents to the ED for positive blood cultures.  Vital signs notable for temperature 100 but otherwise stable including normal pulse ox at 99% on room air.  IV established, labs sent notable for procalcitonin of 8.5, mild leukocytosis 11.3 otherwise normal CBC and electrolytes, normal lactic acid 1.2.  X-ray of the chest was obtained which was interpreted independently by me and revealed no acute process.  EKG obtained which revealed normal sinus rhythm without acute ischemic changes.  Patient is given cefepime 2 g IV and vancomycin with pretreatment with Benadryl 25 mg IV.  Case discussed with [facility] hospital service with agreement to admit to inpatient status.    I have reviewed the nursing notes. I have reviewed the findings, diagnosis, plan and need for follow up with the patient.    New Prescriptions    No medications on file       Final diagnoses:   Bacteremia   ALIZA NJ am serving as a trained medical scribe to  document services personally performed by Jw Alvarez MD, based on the provider's statements to me.      I, Jw Alvarez MD, was physically present and have reviewed and verified the accuracy of this note documented by ALIZA LUU.     Jw Alvarez MD  [facility] EMERGENCY DEPARTMENT  1/19/2024     Jw Alvarez MD  01/20/24 0320

## 2024-01-20 NOTE — PHARMACY-VANCOMYCIN DOSING SERVICE
Pharmacy Vancomycin Initial Note  Date of Service 2024  Patient's  1970  53 year old, male    Indication: Bacteremia    Current estimated CrCl = Estimated Creatinine Clearance: 144.1 mL/min (based on SCr of 0.7 mg/dL).    Creatinine for last 3 days  No results found for requested labs within last 3 days.    Recent Vancomycin Level(s) for last 3 days  No results found for requested labs within last 3 days.      Vancomycin IV Administrations (past 72 hours)        No vancomycin orders with administrations in past 72 hours.                    Nephrotoxins and other renal medications (From now, onward)      Start     Dose/Rate Route Frequency Ordered Stop    24  vancomycin (VANCOCIN) 1,750 mg in sodium chloride 0.9 % 500 mL intermittent infusion         1,750 mg  over 240 Minutes Intravenous ONCE 24  vancomycin (VANCOCIN) 1,250 mg in 0.9% NaCl 250 mL intermittent infusion         1,250 mg  over 180 Minutes Intravenous EVERY 12 HOURS 24              Contrast Orders - past 72 hours (72h ago, onward)      None            [employer] Prediction of Planned Initial Vancomycin Regimen  Loading dose: 1750 mg at 21:00 2024.  Regimen: 1250 mg IV every 12 hours.  Start time: 09:00 on 2024  Exposure target: AUC24 (range)400-600 mg/L.hr   AUC24,ss: 478 mg/L.hr  Probability of AUC24 > 400: 68 %  Ctrough,ss: 14 mg/L  Probability of Ctrough,ss > 20: 24 %  Probability of nephrotoxicity (Lodise CARMELA ): 9 %     Plan:  Start vancomycin  1750 mg IV once, followed by 1250 mg IV every 12 hours WITH premedication for history of transfusion reaction (diphenhydramine).   Vancomycin monitoring method: AUC  Vancomycin therapeutic monitoring goal: 400-600 mg*h/L  Pharmacy will check vancomycin levels as appropriate in 1-3 Days.    Serum creatinine levels will be ordered daily for the first week of therapy and at least twice weekly for subsequent weeks.       Lucia Mims, PharmD

## 2024-01-20 NOTE — H&P
[facility]    History and Physical - Medicine Service, MAROON TEAM        Date of Admission:  1/19/2024    Assessment & Plan      Parker Acevedo is a 53 year old male admitted on 1/19/2024. 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       # Bacteremia, gram negative bacilli and gram positive cocci in pairs and chains   # History of recurrent CLABSI with indwelling Cm line  Has had a history of recurrent klebsiella bacteremia in the setting of indwelling Cm. Cm last replaced on 11/17/23 after 72h negative cultures. At present admission. Verigene growing E coli with culture positive from 1/18/24 for gram negative bacilli and gram positive cocci in pairs and chains. Present infection is beyond only klebsiella, source TBD. CXR unremarkable on admission though procal elevated 8.52. Suspect source relted to Cm but unclear reason for recurrent infections, now multi-org.      - Vancomycin (1/19/24 -**)  - Cefepime (1/19/24-**)  - Trend blood cultures  - Follow up blood cultures from [facility] ED 1/18/24 for speciation   - UA reflex UC pending   - Consider ID consult in AM re: source of recurrent infections, now with multiple organisms  - Replace Cm after 72h negative cultures   - Consider repeat echo to e/f vegetation     Chronic TPN  Short gut syndrome  Severe malnutrition  J tube (used for venting)  Reflux   - PPN when able to start (runs for 12 hours); hold lipids if insufficient IV access  - Bariatric diet, encourage PO intake for hydration and glucose  - Consider IVF if inadequate PO intake   - Continue PTA pantoprazole     Chronic pain  - PTA  "fentanyl patch  - PTA oxycodone     Anemia, chronic: CTM  Tobacco use disorder: Offer supportive aids in AM   Anxiety: PTA lorazepam at bedtime  ADHD: PTA Adderall  Paroxysmal AFib: PTA carvedilol held as normotensive in the setting of sepsis         Diet:  Bariatric diet + restart TPN when able   DVT Prophylaxis: Enoxaparin (Lovenox) SQ  Sanchez Catheter: Not present  Fluids: None  Lines: PRESENT         Cardiac Monitoring: None  Code Status: Full Code    Clinically Significant Risk Factors Present on Admission               # Drug Induced Coagulation Defect: home medication list includes an anticoagulant medication             # Financial/Environmental Concerns:           Disposition Plan      Expected Discharge Date: 01/21/2024                The patient's care was discussed with the Attending Physician, Dr. Luciano .      Chasidy Worthy MD  Medicine Service, [facility]  Securely message with Vocera (more info)  Text page via [employer] Paging/Directory   See signed in provider for up to date coverage information  ______________________________________________________________________    Chief Complaint   \"The infection is happening again\"    History is obtained from the patient and patient's partner.     History of Present Illness   Parker Acevedo is a 53 year old male who has a history of Celestino-En-Y gastric bypass in 2002 c/b need for reoperation and short gut syndrome, severe malnutrition on chronic TPN with Cm, dysphagia, anxiety, recurrent CLABSI, and LUE non-purulent cellulitis and catheter-associated septic subclavian DVT (on Lovenox) who was admitted for sepsis.     Due to complications from Celestino En Y, he is TPN-dependent and requires Cm catheter. He has had a history of recurrent klebsiella bacteremia infections, each time with removal and replacement of Cm after clearing infection. Cm was last replaced on 11/17/23. He felt well " through December, had a fever in early January, then started to feel ill in mid-January.     He noticed he started feeling sick again about 1 week ago. He noted a fever at home that reached a max of 103.2. He feels especially ill when using or flushing the Cm. This feels like the last times he was sick. He reports feeling achy, tired, weak, and cold. He also has a headache. He said he procrastinated from coming because he is tired of recurrent admissions for the same issue. He presented to [facility] ED on 1/18 to get blood cultures. They grew gram positive cocci in rods and chains as well as gram negative bacilli. He presented here for further treatment.       - He last received TPN on 1/18 (normal 12-hour run).   - Fentanyl patch last placed on 1/18.   - He eats by mouth predominately for comfort, able to eat/drink.  - Prefers liquid or dissolvable medications.   - Patient's wife manages his infusions at home also with [facility] nursing support.       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    Complication of anesthesia     Difficulty swallowing     Gastric ulcer, unspecified as acute or chronic, without mention of hemorrhage, perforation, or obstruction     Gastro-oesophageal reflux disease     Head injury     Hiatal hernia     Other bladder disorder     Other chronic pain     PONV (postoperative nausea and vomiting)     Severe malnutrition (H24)     TPN    Short gut syndrome     Tobacco abuse        Past Surgical History   Past Surgical History:   Procedure Laterality Date    AMPUTATION      APPENDECTOMY      BACK SURGERY  11/3/2014    curve in the spine    BIOPSY LYMPH NODE CERVICAL N/A 2/20/2015     Procedure: BIOPSY LYMPH NODE CERVICAL;  Surgeon: Baron Scanlon MD;  [facility]    CHOLECYSTECTOMY      COLONOSCOPY N/A 7/14/2021    Procedure: COLONOSCOPY;  Surgeon: Jimbo Estrada MD;  Location: [facility] OR    COLONOSCOPY N/A 4/13/2022    Procedure: COLONOSCOPY;  Surgeon: Jimbo Estrada MD;  Location: [facility] OR    COLONOSCOPY N/A 9/19/2023    Procedure: Colonoscopy;  Surgeon: Jimbo Estrada MD;  Location: [facility] OR    DISCECTOMY, FUSION CERVICAL ANTERIOR ONE LEVEL, COMBINED N/A 2/15/2017    Procedure: COMBINED DISCECTOMY, FUSION CERVICAL ANTERIOR ONE LEVEL;  Surgeon: Darren Campos MD;  [facility]    ENDOSCOPIC INSERTION TUBE GASTROSTOMY  9/9/2013    Procedure: ENDOSCOPIC INSERTION TUBE GASTROSTOMY;;  Surgeon: Francis Vyas MD;  [facility]    ENDOSCOPIC ULTRASOUND UPPER GASTROINTESTINAL TRACT (GI)  4/29/2011    Procedure:ENDOSCOPIC ULTRASOUND UPPER GASTROINTESTINAL TRACT (GI); Both Procedures done Conjointly; Surgeon:NEREIDA HOUSER; [facility]    ENDOSCOPIC ULTRASOUND UPPER GASTROINTESTINAL TRACT (GI)  9/9/2013    Procedure: ENDOSCOPIC ULTRASOUND UPPER GASTROINTESTINAL TRACT (GI);  Endoscopic Ultrasound Guide Gastrostomy Tube Placement  C-arm;  Surgeon: Noe Lizarraga MD;  [facility]    ENDOSCOPIC ULTRASOUND UPPER GASTROINTESTINAL TRACT (GI) N/A 2/24/2021    Procedure: ENDOSCOPIC ULTRASOUND, ESOPHAGOSCOPY / UPPER GASTROINTESTINAL TRACT (GI), esophagastrogastroduodenoscopy;  Surgeon: Berny Bach MD;  [facility]    ENDOSCOPY  03/25/11    EGD, [facility]    ENDOSCOPY  08/04/09    Upper Endoscopy, [facility]    ENDOSCOPY  01/05/09    Upper Endoscopy, [facility]    ESOPHAGOSCOPY, GASTROSCOPY, DUODENOSCOPY (EGD), COMBINED  4/20/2011    Procedure:COMBINED ESOPHAGOSCOPY, GASTROSCOPY, DUODENOSCOPY (EGD); Surgeon:FRANCIS VYAS; [facility]    ESOPHAGOSCOPY, GASTROSCOPY, DUODENOSCOPY (EGD), COMBINED   6/15/2011    Procedure:COMBINED ESOPHAGOSCOPY, GASTROSCOPY, DUODENOSCOPY (EGD); Surgeon:FRANCIS VYAS; [facility]    ESOPHAGOSCOPY, GASTROSCOPY, DUODENOSCOPY (EGD), COMBINED  6/12/2013    Procedure: COMBINED ESOPHAGOSCOPY, GASTROSCOPY, DUODENOSCOPY (EGD);;  Surgeon: Francis Vyas MD;  [facility]    ESOPHAGOSCOPY, GASTROSCOPY, DUODENOSCOPY (EGD), COMBINED  11/22/2013    Procedure: COMBINED ESOPHAGOSCOPY, GASTROSCOPY, DUODENOSCOPY (EGD);;  Surgeon: Francis Vyas MD;  [facility]    ESOPHAGOSCOPY, GASTROSCOPY, DUODENOSCOPY (EGD), COMBINED  4/30/2014    Procedure: COMBINED ESOPHAGOSCOPY, GASTROSCOPY, DUODENOSCOPY (EGD);  Surgeon: Francis Vyas MD;  [facility]    ESOPHAGOSCOPY, GASTROSCOPY, DUODENOSCOPY (EGD), COMBINED N/A 2/20/2015    Procedure: COMBINED ESOPHAGOSCOPY, GASTROSCOPY, DUODENOSCOPY (EGD), BIOPSY SINGLE OR MULTIPLE;  Surgeon: Baron Scanlon MD;  Location:  OR    ESOPHAGOSCOPY, GASTROSCOPY, DUODENOSCOPY (EGD), COMBINED N/A 9/30/2015    Procedure: COMBINED ESOPHAGOSCOPY, GASTROSCOPY, DUODENOSCOPY (EGD);  Surgeon: Francis Vyas MD;  Location:  GI    ESOPHAGOSCOPY, GASTROSCOPY, DUODENOSCOPY (EGD), COMBINED N/A 10/3/2019    Procedure: Upper Endoscopy;  Surgeon: Clif Morrow MD;  [facility]    GASTRECTOMY  6/22/2012    Procedure: GASTRECTOMY;  Open Approach, Excise Ulcers,Partial Gastrectomy, Esophagojejunostomy, Hiatal Hernia Repair, Extensive Lysis of Adhesions and Esaphagogastrodudenoscopy.;  Surgeon: Francis Vyas MD;  [facility]    GASTROJEJUNOSTOMY  08/26/09    Extensice enterolysis, partial resect. jejunum, part. resect gastric pouch, gastrojejunostomy anastomosis    HC ESOPH/GAS REFLUX TEST W NASAL IMPED ELECTRODE  8/5/2013    Procedure: ESOPHAGEAL IMPEDENCE FUNCTION TEST 1 HOUR OR LESS;  Surgeon: Halie Lang MD;  Location:  GI    HEAD & NECK SURGERY  2/15/2017    C5-C6    HERNIA REPAIR  2006    Umbilical  hernia    HERNIORRHAPHY HIATAL  6/22/2012    Procedure: HERNIORRHAPHY HIATAL;;  Surgeon: Francis Vyas MD;  [facility]    HERNIORRHAPHY INGUINAL  11/22/2013    Procedure: HERNIORRHAPHY INGUINAL;;  Surgeon: Francis Vyas MD;  [facility]    INSERT PICC LINE Right 12/19/2019    Procedure: Picc Placement;  Surgeon: Per Dumont PA-C;  [facility]    INSERT PICC LINE Right 2/21/2020    Procedure: INSERTION, PICC;  Surgeon: Per Dumont PA-C;  [facility]    INSERT PORT VASCULAR ACCESS Right 12/19/2017    Procedure: INSERT PORT VASCULAR ACCESS;  Right Chest Port Placement ;  Surgeon: Lisandro Alejandro PA-C;  [facility]    INSERT PORT VASCULAR ACCESS Right 8/2/2018    Procedure: INSERT PORT VASCULAR ACCESS;  Place single lumen tunneled central venous access catheter;  Surgeon: Guy Jamil PA-C;  [facility]    IR CVC TUNNEL PLACEMENT > 5 YRS OF AGE  8/7/2019    IR CVC TUNNEL PLACEMENT > 5 YRS OF AGE  4/14/2020    IR CVC TUNNEL PLACEMENT > 5 YRS OF AGE  8/3/2020    IR CVC TUNNEL PLACEMENT > 5 YRS OF AGE  9/4/2020    IR CVC TUNNEL PLACEMENT > 5 YRS OF AGE  2/5/2021    IR CVC TUNNEL PLACEMENT > 5 YRS OF AGE  3/23/2021    IR CVC TUNNEL PLACEMENT > 5 YRS OF AGE  1/13/2022    IR CVC TUNNEL PLACEMENT > 5 YRS OF AGE  5/12/2022    IR CVC TUNNEL PLACEMENT > 5 YRS OF AGE  7/13/2022    IR CVC TUNNEL PLACEMENT > 5 YRS OF AGE  7/10/2023    IR CVC TUNNEL PLACEMENT > 5 YRS OF AGE  11/17/2023    IR CVC TUNNEL REMOVAL LEFT  6/7/2023    IR CVC TUNNEL REMOVAL LEFT  11/14/2023    IR CVC TUNNEL REMOVAL RIGHT  10/1/2019    IR CVC TUNNEL REMOVAL RIGHT  7/30/2020    IR CVC TUNNEL REMOVAL RIGHT  9/2/2020    IR CVC TUNNEL REMOVAL RIGHT  2/3/2021    IR CVC TUNNEL REMOVAL RIGHT  3/19/2021    IR CVC TUNNEL REMOVAL RIGHT  1/10/2022    IR CVC TUNNEL REMOVAL RIGHT  5/9/2022    IR CVC TUNNEL REMOVAL RIGHT  7/8/2022    IR CVC TUNNEL REVISION LEFT  8/10/2022    IR CVC TUNNEL REVISION  LEFT  9/19/2023    IR CVC TUNNEL REVISION RIGHT  5/7/2021    IR FOLLOW UP VISIT OUTPATIENT  8/7/2019    IR PICC EXCHANGE LEFT  6/8/2023    IR PICC PLACEMENT > 5 YRS OF AGE  3/7/2019    IR PICC PLACEMENT > 5 YRS OF AGE  12/19/2019    IR PICC PLACEMENT > 5 YRS OF AGE  2/21/2020    IR PICC PLACEMENT > 5 YRS OF AGE  6/7/2023    LAPAROTOMY EXPLORATORY  11/22/2013    Procedure: LAPAROTOMY EXPLORATORY;  Exploratory Laparotomy, Upper Endoscopy, Left Inguinal Hernia Repair;  Surgeon: Francis Vyas MD;  [facility]    LAPAROTOMY EXPLORATORY N/A 9/30/2023    Procedure: Laparotomy exploratory, reduction of intussusception, resection of small bowel with primary anastamosis, upper endoscopy;  Surgeon: Delvis Mercado MD;  [facility]    ORTHOPEDIC SURGERY      PICC INSERTION Right 03/16/2017    5fr DL BioFlo PICC, 42cm (3cm external) in the R medial brachial vein w/ tip in the SVC RA junction.    PICC INSERTION Left 09/23/2017    5fr DL BioFlo PICC, 45cm (1cm external) in the L basilic vein w/ tip in the SVC RA junction.    PICC INSERTION Right 05/16/2019    5Fr - 43cm, Medial brachial vein, low SVC    PICC INSERTION Right 10/02/2019    5Fr - 43cm (2cm external), basilic vein, low SVC    SHAYLEE EN Y BOWEL  2003    SOFT TISSUE SURGERY      THORACIC SURGERY      TONSILLECTOMY      TRANSESOPHAGEAL ECHOCARDIOGRAM INTRAOPERATIVE N/A 1/8/2019    Procedure: TRANSESOPHAGEAL ECHOCARDIOGRAM INTRAOPERATIVE;  Surgeon: GENERIC ANESTHESIA PROVIDER;  [facility]    TRANSESOPHAGEAL ECHOCARDIOGRAM INTRAOPERATIVE N/A 7/7/2023    Procedure: Transesophageal echocardiogram in the OR;  Surgeon: GENERIC ANESTHESIA PROVIDER;  [facility]    TRANSESOPHAGEAL ECHOCARDIOGRAM INTRAOPERATIVE N/A 11/17/2023    Procedure: ECHOCARDIOGRAM,TRANSESOPHAGEAL,WITH ANESTHESIA;  Surgeon: GENERIC ANESTHESIA PROVIDER;  [facility]    ZZC GASTRIC BYPASS,OBESE<100CM SHAYLEE-EN-Y  2002    lost 300 pounds       Prior to Admission Medications   Prior  "to Admission Medications   Prescriptions Last Dose Informant Patient Reported? Taking?   ALPRAZolam (XANAX) 0.5 MG tablet   No No   Sig: TAKE ONE TABLET BY MOUTH TWICE A DAY AS NEEDED FOR SLEEP OR ANXIETY   [facility] INFUSION MANAGED PATIENT  Spouse/Significant Other No No   Sig: Contact [facility] for patient specific medication information at [phone] on admission and discharge from the hospital.  Phones are answered 24 hours a day 7 days a week 365 days a year.    Providers - Choose \"CONTINUE HOME MED (no script)\" at discharge if patient treatment with home infusion will continue.   Lidocaine (LIDOCARE) 4 % Patch   Yes No   Sig: Place 1 patch onto the skin daily as needed for moderate pain To prevent lidocaine toxicity, patient should be patch free for 12 hrs daily.   Syringe/Needle, Disp, (B-D ECLIPSE SYRINGE) 27G X 1/2\" 1 ML MISC  Spouse/Significant Other No No   Si Device every 30 days   albuterol (VENTOLIN HFA) 108 (90 Base) MCG/ACT inhaler  Spouse/Significant Other No No   Sig: Inhale 2 puffs into the lungs every 6 hours as needed   amphetamine-dextroamphetamine (ADDERALL) 20 MG tablet   No No   Sig: TAKE ONE TABLET BY MOUTH ONCE DAILY   carvedilol (COREG) 6.25 MG tablet   No No   Sig: TAKE 2 TABLETS (12.5 MG) BY MOUTH 2 TIMES DAILY WITH MEALS   cyanocobalamin (CYANOCOBALAMIN) 1000 MCG/ML injection  Spouse/Significant Other No No   Sig: INJECT 1 ML INTO THE MUSCLE EVERY 30 DAYS   enoxaparin ANTICOAGULANT (LOVENOX) 80 MG/0.8ML syringe   No No   Sig: INJECT THE CONTENTS OF ONE SYRINGE (80MG) UNDER THE SKIN TWO TIMES A DAY   fentaNYL (DURAGESIC) 25 mcg/hr 72 hr patch   No No   Sig: Place 1 patch onto the skin every 48 hours Fill 24 and start 24. 30 day supply for chronic pain.   lipids plant base (CLINOLIPID) 20 % infusion   No No   Sig: Inject 250 mLs into the vein every 24 hours   naloxone (NARCAN) 4 MG/0.1ML nasal spray   No No   Sig: Spray 1 spray (4 mg) into one " nostril alternating nostrils as needed for opioid reversal every 2-3 minutes until assistance arrives   nystatin (MYCOSTATIN) 756345 UNIT/GM external cream   No No   Sig: Apply topically 2 times daily   ondansetron (ZOFRAN ODT) 8 MG ODT tab   No No   Sig: DISSOLVE ONE TABLET BY MOUTH EVERY 8 HOURS AS NEEDED FOR NAUSEA   oxyCODONE (ROXICODONE) 5 MG/5ML solution   No No   Sig: Take 5-10 mLs (5-10 mg) by mouth every 4 hours as needed for moderate to severe pain Max of 40mg/day. Fill 01/26/24 and start 01/28/24. 30 day supply for chronic pain.   pantoprazole (PROTONIX) 40 MG EC tablet   No No   Sig: TAKE 1 TABLET (40 MG) BY MOUTH DAILY   polyethylene glycol (MIRALAX) 17 GM/Dose powder   No No   Sig: Take 17 g by mouth daily   pregabalin (LYRICA) 25 MG capsule   No No   Sig: Take 25mg at bedtime for 7 days, then 25mg twice daily for 7 days, then 25mg in the AM and 50mg at bedtime for 7 days, then take 50mg twice daily. If side effects, then reduce to last tolerable dosage.   sucralfate (CARAFATE) 1 GM/10ML suspension   No No   Sig: TAKE 10MLS  BY MOUTH FOUR TIMES A DAY AS NEEDED   vitamin D2 (ERGOCALCIFEROL) 84585 units (1250 mcg) capsule   No No   Sig: Take 1 capsule (50,000 Units) by mouth once a week      Facility-Administered Medications: None           Physical Exam   Vital Signs: Temp: 99.8  F (37.7  C) Temp src: Oral BP: 107/59 Pulse: 70   Resp: 18 SpO2: 98 % O2 Device: None (Room air)    Weight: 0 lbs 0 oz    General Appearance: Uncomfortable though not in distress  Respiratory: CTAB, breathing comfortably on room air   Cardiovascular: RRR, no m/r/g  GI: G tube site c/d/i, abdomen soft, nontender, nondistended  Skin: warm, dry. Cm site c/d/i, no induration or erythema     Medical Decision Making       Please see A&P for additional details of medical decision making.      Data     I have personally reviewed the following data over the past 24 hrs:    11.3 (H)  \   9.9 (L)   / 313     142 108 (H) 12.0 /  104  (H)   4.3 26 0.85 \     ALT: 10 AST: 17 AP: 78 TBILI: 0.2   ALB: 3.9 TOT PROTEIN: 7.3 LIPASE: N/A     Procal: 8.52 (HH) CRP: N/A Lactic Acid: 1.2         Imaging results reviewed over the past 24 hrs:   Recent Results (from the past 24 hour(s))   XR Chest Port 1 View    Narrative    EXAM: XR CHEST PORT 1 VIEW  LOCATION: [facility]  DATE: 1/20/2024    INDICATION: septic with azevedo in place  COMPARISON: 11/14/2023      Impression    IMPRESSION: Negative chest. Left subclavian catheter terminates at the superior cavoatrial junction.

## 2024-01-20 NOTE — PLAN OF CARE
Goal Outcome Evaluation:      Plan of Care Reviewed With: patient  /59 (BP Location: Right arm, Cuff Size: Adult Regular)   Pulse 70   Temp 99.8  F (37.7  C) (Oral)   Resp 18   SpO2 98%  RA.Pt has his wife at bedside and medicated for pain in back and abdomen.Was started on vanc IV after bolus given last evening.Blood cultures continue to be positive with gram neg bacilli and gram positive cocci in pairs and chains from [facility] and MD notified during the night.Had oxy and tylenol for pain with good relief to sleep.Pt has allergy to vanco and needs benadryl before its given.Has fentanyl patch on left arm and is due for change at 0800am.Up to the bathroom and voiding well.Will continue to monitor.

## 2024-01-21 LAB
ANION GAP SERPL CALCULATED.3IONS-SCNC: 7 MMOL/L (ref 7–15)
BACTERIA BLD CULT: ABNORMAL
BUN SERPL-MCNC: 10.9 MG/DL (ref 6–20)
CALCIUM SERPL-MCNC: 8.4 MG/DL (ref 8.6–10)
CHLORIDE SERPL-SCNC: 108 MMOL/L (ref 98–107)
CREAT SERPL-MCNC: 0.77 MG/DL (ref 0.67–1.17)
DEPRECATED HCO3 PLAS-SCNC: 26 MMOL/L (ref 22–29)
EGFRCR SERPLBLD CKD-EPI 2021: >90 ML/MIN/1.73M2
ERYTHROCYTE [DISTWIDTH] IN BLOOD BY AUTOMATED COUNT: 18.9 % (ref 10–15)
GLUCOSE SERPL-MCNC: 87 MG/DL (ref 70–99)
HCT VFR BLD AUTO: 28.7 % (ref 40–53)
HGB BLD-MCNC: 8.2 G/DL (ref 13.3–17.7)
MCH RBC QN AUTO: 23.2 PG (ref 26.5–33)
MCHC RBC AUTO-ENTMCNC: 28.6 G/DL (ref 31.5–36.5)
MCV RBC AUTO: 81 FL (ref 78–100)
PLATELET # BLD AUTO: 250 10E3/UL (ref 150–450)
POTASSIUM SERPL-SCNC: 4.5 MMOL/L (ref 3.4–5.3)
RBC # BLD AUTO: 3.54 10E6/UL (ref 4.4–5.9)
SODIUM SERPL-SCNC: 141 MMOL/L (ref 135–145)
VANCOMYCIN SERPL-MCNC: 10.1 UG/ML
WBC # BLD AUTO: 7.6 10E3/UL (ref 4–11)

## 2024-01-21 PROCEDURE — 99223 1ST HOSP IP/OBS HIGH 75: CPT | Performed by: INTERNAL MEDICINE

## 2024-01-21 PROCEDURE — 250N000013 HC RX MED GY IP 250 OP 250 PS 637: Performed by: NURSE PRACTITIONER

## 2024-01-21 PROCEDURE — 36592 COLLECT BLOOD FROM PICC: CPT | Performed by: INTERNAL MEDICINE

## 2024-01-21 PROCEDURE — 85027 COMPLETE CBC AUTOMATED: CPT | Performed by: STUDENT IN AN ORGANIZED HEALTH CARE EDUCATION/TRAINING PROGRAM

## 2024-01-21 PROCEDURE — 99233 SBSQ HOSP IP/OBS HIGH 50: CPT | Performed by: STUDENT IN AN ORGANIZED HEALTH CARE EDUCATION/TRAINING PROGRAM

## 2024-01-21 PROCEDURE — 80202 ASSAY OF VANCOMYCIN: CPT | Performed by: INTERNAL MEDICINE

## 2024-01-21 PROCEDURE — 250N000013 HC RX MED GY IP 250 OP 250 PS 637

## 2024-01-21 PROCEDURE — 80048 BASIC METABOLIC PNL TOTAL CA: CPT | Performed by: STUDENT IN AN ORGANIZED HEALTH CARE EDUCATION/TRAINING PROGRAM

## 2024-01-21 PROCEDURE — 250N000011 HC RX IP 250 OP 636

## 2024-01-21 PROCEDURE — 250N000011 HC RX IP 250 OP 636: Performed by: STUDENT IN AN ORGANIZED HEALTH CARE EDUCATION/TRAINING PROGRAM

## 2024-01-21 PROCEDURE — 250N000011 HC RX IP 250 OP 636: Performed by: EMERGENCY MEDICINE

## 2024-01-21 PROCEDURE — 258N000003 HC RX IP 258 OP 636: Performed by: STUDENT IN AN ORGANIZED HEALTH CARE EDUCATION/TRAINING PROGRAM

## 2024-01-21 PROCEDURE — 120N000002 HC R&B MED SURG/OB UMMC

## 2024-01-21 PROCEDURE — 36591 DRAW BLOOD OFF VENOUS DEVICE: CPT | Performed by: STUDENT IN AN ORGANIZED HEALTH CARE EDUCATION/TRAINING PROGRAM

## 2024-01-21 RX ORDER — CARVEDILOL 12.5 MG/1
12.5 TABLET ORAL 2 TIMES DAILY WITH MEALS
Status: DISCONTINUED | OUTPATIENT
Start: 2024-01-21 | End: 2024-01-25 | Stop reason: HOSPADM

## 2024-01-21 RX ORDER — NALOXONE HYDROCHLORIDE 0.4 MG/ML
0.4 INJECTION, SOLUTION INTRAMUSCULAR; INTRAVENOUS; SUBCUTANEOUS
Status: DISCONTINUED | OUTPATIENT
Start: 2024-01-21 | End: 2024-01-25 | Stop reason: HOSPADM

## 2024-01-21 RX ORDER — NALOXONE HYDROCHLORIDE 0.4 MG/ML
0.2 INJECTION, SOLUTION INTRAMUSCULAR; INTRAVENOUS; SUBCUTANEOUS
Status: DISCONTINUED | OUTPATIENT
Start: 2024-01-21 | End: 2024-01-25 | Stop reason: HOSPADM

## 2024-01-21 RX ORDER — POLYETHYLENE GLYCOL 3350 17 G/17G
17 POWDER, FOR SOLUTION ORAL DAILY
Status: DISCONTINUED | OUTPATIENT
Start: 2024-01-21 | End: 2024-01-21

## 2024-01-21 RX ADMIN — ONDANSETRON 8 MG: 8 TABLET, ORALLY DISINTEGRATING ORAL at 08:40

## 2024-01-21 RX ADMIN — CARVEDILOL 12.5 MG: 12.5 TABLET, FILM COATED ORAL at 22:33

## 2024-01-21 RX ADMIN — ENOXAPARIN SODIUM 80 MG: 80 INJECTION SUBCUTANEOUS at 08:39

## 2024-01-21 RX ADMIN — ACETAMINOPHEN 650 MG: 325 SOLUTION ORAL at 22:04

## 2024-01-21 RX ADMIN — ALPRAZOLAM 0.5 MG: 0.5 TABLET ORAL at 22:43

## 2024-01-21 RX ADMIN — OXYCODONE HYDROCHLORIDE 10 MG: 5 SOLUTION ORAL at 08:39

## 2024-01-21 RX ADMIN — ACETAMINOPHEN 650 MG: 325 SOLUTION ORAL at 14:35

## 2024-01-21 RX ADMIN — VANCOMYCIN HYDROCHLORIDE 1500 MG: 10 INJECTION, POWDER, LYOPHILIZED, FOR SOLUTION INTRAVENOUS at 13:02

## 2024-01-21 RX ADMIN — NYSTATIN: 100000 CREAM TOPICAL at 08:41

## 2024-01-21 RX ADMIN — CEFEPIME 2 G: 2 INJECTION, POWDER, FOR SOLUTION INTRAVENOUS at 06:10

## 2024-01-21 RX ADMIN — PREGABALIN 50 MG: 50 CAPSULE ORAL at 21:29

## 2024-01-21 RX ADMIN — OXYCODONE HYDROCHLORIDE 10 MG: 5 SOLUTION ORAL at 14:35

## 2024-01-21 RX ADMIN — PREGABALIN 50 MG: 50 CAPSULE ORAL at 08:40

## 2024-01-21 RX ADMIN — OXYCODONE HYDROCHLORIDE 10 MG: 5 SOLUTION ORAL at 03:48

## 2024-01-21 RX ADMIN — DEXTROAMPHETAMINE SACCHARATE, AMPHETAMINE ASPARTATE, DEXTROAMPHETAMINE SULFATE AND AMPHETAMINE SULFATE 20 MG: 2.5; 2.5; 2.5; 2.5 TABLET ORAL at 08:40

## 2024-01-21 RX ADMIN — DIPHENHYDRAMINE HYDROCHLORIDE 25 MG: 50 INJECTION, SOLUTION INTRAMUSCULAR; INTRAVENOUS at 12:20

## 2024-01-21 RX ADMIN — ALPRAZOLAM 0.5 MG: 0.5 TABLET ORAL at 08:40

## 2024-01-21 RX ADMIN — ONDANSETRON 8 MG: 8 TABLET, ORALLY DISINTEGRATING ORAL at 21:32

## 2024-01-21 RX ADMIN — CEFEPIME 2 G: 2 INJECTION, POWDER, FOR SOLUTION INTRAVENOUS at 23:27

## 2024-01-21 RX ADMIN — CEFEPIME 2 G: 2 INJECTION, POWDER, FOR SOLUTION INTRAVENOUS at 16:52

## 2024-01-21 RX ADMIN — DIPHENHYDRAMINE HYDROCHLORIDE 25 MG: 50 INJECTION, SOLUTION INTRAMUSCULAR; INTRAVENOUS at 23:27

## 2024-01-21 RX ADMIN — ENOXAPARIN SODIUM 80 MG: 80 INJECTION SUBCUTANEOUS at 19:57

## 2024-01-21 RX ADMIN — NYSTATIN: 100000 CREAM TOPICAL at 20:02

## 2024-01-21 RX ADMIN — ALPRAZOLAM 0.5 MG: 0.5 TABLET ORAL at 00:55

## 2024-01-21 RX ADMIN — OXYCODONE HYDROCHLORIDE 10 MG: 5 SOLUTION ORAL at 22:30

## 2024-01-21 ASSESSMENT — ACTIVITIES OF DAILY LIVING (ADL)
ADLS_ACUITY_SCORE: 28
ADLS_ACUITY_SCORE: 24
ADLS_ACUITY_SCORE: 28
ADLS_ACUITY_SCORE: 24
ADLS_ACUITY_SCORE: 28
ADLS_ACUITY_SCORE: 24
DEPENDENT_IADLS:: TRANSPORTATION

## 2024-01-21 NOTE — PLAN OF CARE
/59 (BP Location: Right arm, Cuff Size: Adult Regular)   Pulse 70   Temp 99.8  F (37.7  C) (Oral)   Resp 18   SpO2 98%     Patient is alert and oriented x 4. VSS on room air. Intermittently refusing things because he cannot go outside to smoke in the ED. IV cefepime refused and provider notified. Fentanyl patch to L shoulder. Oxycodone, xanax, and zofran given PRN. Patient is able to make needs known, wife at bedside. Continue with plan of care

## 2024-01-21 NOTE — PROGRESS NOTES
/59 (BP Location: Right arm, Cuff Size: Adult Regular)   Pulse 70   Temp 99.8  F (37.7  C) (Oral)   Resp 18   SpO2 98%  RA.Pt c/o pain in back and abd and said pain a 8/10 and medicated with oxy and tylenol.Very unhappy he is still in ED and waiting for a bed on 7C when available.Report called to &C and pt up walking in [place].Lab called with positive blood culture from 1/19/24 at 2130 and MD cross cover notified of that.On Vanco and and needs premed of benadryl before.Wife at bedside and will go with his belongings when ready.

## 2024-01-21 NOTE — PROVIDER NOTIFICATION
Call from lab that line from 1/19 /24 at 2130 was gram poss for cocci in pairs and chains. Will notify [facility] [employer] cover.

## 2024-01-21 NOTE — CONSULTS
Care Management Initial Consult    General Information  Assessment completed with: Patient    Type of CM/SW Visit: Initial Assessment  Primary Care Provider verified and updated as needed: Yes   Readmission within the last 30 days: no previous admission in last 30 days   Reason for Consult: discharge planning, other (see comments) (Elevated WILBERT)  Advance Care Planning: Yes  Advance Care Planning Reviewed: present on chart, verified with patient, no concerns identified        Communication Assessment  Patient's communication style: spoken language (English or Bilingual)    Hearing Difficulty or Deaf: no   Wear Glasses or Blind: no    Cognitive  Cognitive/Neuro/Behavioral: WDL                      Living Environment:   People in home: spouse, adult children  Current living Arrangements: house      Able to return to prior arrangements: yes     Family/Social Support:  Care provided by: self  Provides care for: no one  Marital Status:   Support System: Wife          Description of Support System: Supportive, Involved    Support Assessment: Adequate family and caregiver support, Adequate social supports    Current Resources:   Patient receiving home care services: No  Community Resources: Infusion Services  Equipment currently used at home: cane, straight  Supplies currently used at home: Other, Gloves    Employment/Financial:  Employment Status: disabled     Financial Concerns: none   Referral to Financial Worker: No  Does the patient's insurance plan have a 3 day qualifying hospital stay waiver?  No    Lifestyle & Psychosocial Needs:  Social Determinants of Health     Food Insecurity: No Food Insecurity (5/20/2022)    Hunger Vital Sign     Worried About Running Out of Food in the Last Year: Never true     Ran Out of Food in the Last Year: Never true   Depression: Not at risk (2/8/2023)    PHQ-2     PHQ-2 Score: 0   Housing Stability: Low Risk  (8/4/2020)    Housing Stability Vital Sign     Unable to Pay for  Housing in the Last Year: No     Number of Places Lived in the Last Year: 1     Unstable Housing in the Last Year: No   Tobacco Use: High Risk (1/18/2024)    Patient History     Smoking Tobacco Use: Light Smoker     Smokeless Tobacco Use: Never     Passive Exposure: Not on file   Financial Resource Strain: Medium Risk (5/20/2022)    Overall Financial Resource Strain (CARDIA)     Difficulty of Paying Living Expenses: Somewhat hard   Alcohol Use: Not At Risk (8/4/2020)    AUDIT-C     Frequency of Alcohol Consumption: Never     Average Number of Drinks: Patient declined     Frequency of Binge Drinking: Never   Transportation Needs: No Transportation Needs (5/20/2022)    PRAPARE - Transportation     Lack of Transportation (Medical): No     Lack of Transportation (Non-Medical): No   Physical Activity: Unknown (8/4/2020)    Exercise Vital Sign     Days of Exercise per Week: 2 days     Minutes of Exercise per Session: Not on file   Interpersonal Safety: Not At Risk (8/4/2020)    Humiliation, Afraid, Rape, and Kick questionnaire     Fear of Current or Ex-Partner: No     Emotionally Abused: No     Physically Abused: No     Sexually Abused: No   Stress: No Stress Concern Present (8/4/2020)    [employer] - Occupational Stress Questionnaire     Feeling of Stress : Only a little   Social Connections: Socially Isolated (8/4/2020)    Social Connection and Isolation Panel [NHANES]     Frequency of Communication with Friends and Family: Once a week     Frequency of Social Gatherings with Friends and Family: Never     Attends Yazidism Services: Never     Active Member of Clubs or Organizations: No     Attends Club or Organization Meetings: Never     Marital Status:      Functional Status:  Prior to admission patient needed assistance:   Dependent ADLs:: Independent  Dependent IADLs:: Transportation  Assesssment of Functional Status: At functional baseline    Mental Health Status:  Mental Health  "Status: Current Concern  (Anxiety, ADHD)  Mental Health Management: Medication (Per chart review, PTA Lorazepam & Adderall)    Chemical Dependency Status:  Chemical Dependency Status: No Current Concerns           Values/Beliefs:  Spiritual, Cultural Beliefs, [employer]itan Practices, Values that affect care: no             Additional Information:  Background: Per H&P, Parker Acevedo is a 53 year old male admitted on 1/19/2024. 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      Progress: Care management team consulted d/t elevated unplanned hospital readmission risk score (WILBERT) & resumption of home infusion services. Chart reviewed. This writer met with patient at bedside to complete the care management assessment. Writer introduced self and the role of the SW & RNCC. SW verified address, PCP, emergency contacts, and health insurance. SW provided education on advanced care planning and verified that pt's advanced care directive in the EMR is current.    Pt lives in a house with his wife and adult children. He is disabled & reports being \"semi-retired\". He is indepdendent at baseline, but his wife provides a lot of rides for him. He uses a cane at home & uses [facility] for TPN, IVF, & Lipids and is in contact with liaison Tatum. He has previously received IVAbx through [facility] and anticipates needing this at discharge. He denied any concerns for finances or other basic needs.He feels his mental health needs are met via his medications. He did state that health concerns can impact his mental health, however he tries to stay positive. He has a good support system of his wife & adult children that helps him cope. He denied " any chemical health needs. He denied any spiritual/cultural needs during this admission.    SW provided means of contacting the unit care management team & encouraged pt to reach out with any needs, questions, or concerns.    Services:    [facility] (Ph: [phone]; Email: [email or web address]; Fax: [phone])   PTA TPN & Lipids  1/21: Liaison Tatum informed of pt's hospital admission & likely need for IVAbx at discharge.    Plan: Anticipate this patient will return home with family & home infusion for TPN, Lipids, IVF, & new IVABx. /RN Care Coordinator will follow for discharge planning, psychosocial needs, community resources, and advocacy.  __________________________     TOMY Mcgill, LICSW  Weekend Coverage Clinical   Phone: [phone]  Pager: [phone]  [place]  [facility]-   ________________________________________    Weekend Unit 7C : Pager: [phone]   SEARCHABLE in [employer] SOCIAL WORK     Weekend Unit 7A, 7B, & 7C RN Care Coordinator:  Pager: [phone]  SEARCHABLE in [employer] search CARE COORDINATOR

## 2024-01-21 NOTE — SUMMARY OF CARE
Reason for admission: Bacteremia  Admitted from:  ED  Report received from:      Diane DUPREE, RN     2 RN skin assessment completed by:Meliza COATES and Lillie RNs      - Findings (add LDA if needed):     Bed algorithm reevaluated: no  Was airflow pump ordered?:no  Suction set up in room? yes  Care plan (primary problem) and education initiated: yes  MDRO education done if applicable: no  Pt informed about policy regarding no IV pumps off unit: yes  Flu shot ordered? (October-April only): no, but needs to be ordered  Detailed Belongings:  Clothing, jacket, shoes, boots, 3 rings. Bracelet  bag,wallet , cigarettes

## 2024-01-21 NOTE — PLAN OF CARE
Goal Outcome Evaluation:      Plan of Care Reviewed With: patient    Overall Patient Progress: no changeOverall Patient Progress: no change    Outcome Evaluation: Care management team consulted d/t elevated WILBERT & PTA home infusion. Anticipate pt will be able to discharge to return home with resumption of home infusion TPN, IVF, & lipids ( & probably IVABx). SW/RNCC to follow for safe discharge coordination.  __________________________     TOMY Mcgill, LICSW  Weekend Coverage Clinical   Phone: [phone]  Pager: [phone]  Fax: [phone]  [place]  [address]  ____________________________    Social Work and Care Management Department Weekend Contact:    : SEARCHABLE in [employer] SOCIAL WORK     [place] (0800 - 1630) Saturday and Sunday   Units: 4A, 4C, & 4E Pager: [phone]    Units: 5A & 5C Pager: [phone]   Units: 6A & 6B   Pager: [phone]   Units: 6C & 6D Pager: [phone]   Units: 7A & 7B  Pager: [phone]    Units: 7C Pager: [phone]   Unit: [place] ED Pager: [phone]       [facility] (4460-7963) Saturday and Sunday    Units: 5 Ortho, 5 Med/Surg & WB ED  Pager:[phone]   Units: 6 Med/Surg, 8A, & 10A ICU  Pager: [phone]      After hours (1630 - 0000) Saturday & Sunday; (7922-4832) Mon-Fri; (1222-7330) [facility] Recognized Holidays   Units: ALL  Pager: [phone]     RN Care Coordinator: SEARCHABLE in [employer] CARE COORDINATOR       [place] & [place] (1497-9712) Saturday & Sunday; (2330-6518) [facility] Recognized Holidays   Units: 5A & 5C Pager: [phone]  Units: 6B, 6C & 6D Pager: [phone]  Units: 7A, 7B, & 7C Pager: [phone]  Units: 6A & ICU Pager: [phone]  Units: 5 Ortho, 5MS & WB ED Pager: [phone]  Units: 6MS, 8A & 10 ICU  Pager [phone]

## 2024-01-21 NOTE — PHARMACY-VANCOMYCIN DOSING SERVICE
Pharmacy Vancomycin Note  Date of Service 2024  Patient's  1970   53 year old, male    Indication: Bacteremia  Day of Therapy: 3  Current vancomycin regimen:  1250 mg IV q12h  Current vancomycin monitoring method: AUC  Current vancomycin therapeutic monitoring goal: 400-600 mg*h/L    Insight[employer] Prediction of Current Vancomycin Regimen    Regimen: 1250 mg IV every 12 hours.  Start time: 11:37 on 2024  Exposure target: AUC24 (range)400-600 mg/L.hr   AUC24,ss: 407 mg/L.hr  Probability of AUC24 > 400: 54 %  Ctrough,ss: 11.1 mg/L  Probability of Ctrough,ss > 20: 2 %  Probability of nephrotoxicity (Lodise CARMELA ): 7 %      Current estimated CrCl = Estimated Creatinine Clearance: 137.6 mL/min (based on SCr of 0.77 mg/dL).    Creatinine for last 3 days  2024:  9:13 PM Creatinine 0.85 mg/dL  2024:  5:50 AM Creatinine 0.77 mg/dL    Recent Vancomycin Levels (past 3 days)  2024:  9:52 AM Vancomycin 10.1 ug/mL    Vancomycin IV Administrations (past 72 hours)                     vancomycin (VANCOCIN) 1,250 mg in 0.9% NaCl 250 mL intermittent infusion (mg) 1,250 mg New Bag 24 2337     1,250 mg New Bag  1148    vancomycin (VANCOCIN) 1,750 mg in sodium chloride 0.9 % 500 mL intermittent infusion (mg) 1,750 mg New Bag 24 2225                    Nephrotoxins and other renal medications (From now, onward)      Start     Dose/Rate Route Frequency Ordered Stop    24 1100  vancomycin (VANCOCIN) 1,500 mg in 0.9% NaCl 250 mL intermittent infusion         1,500 mg  over 180 Minutes Intravenous EVERY 12 HOURS 24 1047                 Contrast Orders - past 72 hours (72h ago, onward)      None            Interpretation of levels and current regimen:  Vancomycin level is reflective of -600, however the ELISA of the Enterococcus faecalis isolated from the blood is 2 micrograms/ml, which is the higher end of what is still considered susceptible. Therefore, we will aim higher  than the bottom end of the range for AUC here.    Has serum creatinine changed greater than 50% in last 72 hours: No    Renal Function: Stable    InsightRX Prediction of Planned New Vancomycin Regimen    Regimen: 1500 mg IV every 12 hours.  Start time: 11:37 on 01/21/2024  Exposure target: AUC24 (range)400-600 mg/L.hr   AUC24,ss: 488 mg/L.hr  Probability of AUC24 > 400: 85 %  Ctrough,ss: 12.7 mg/L  Probability of Ctrough,ss > 20: 7 %  Probability of nephrotoxicity (Lodise CARMELA 2009): 8 %      Plan:  Increase Dose to 1500mg IV vancomycin Q12H  Vancomycin monitoring method: AUC  Vancomycin therapeutic monitoring goal: 400-600 mg*h/L  Pharmacy will check vancomycin levels as appropriate in 1-3 Days.  Serum creatinine levels will be ordered daily for the first week of therapy and at least twice weekly for subsequent weeks.    Wilberto CarrenoD, [facility]    [phone]  Pager [phone]

## 2024-01-21 NOTE — PLAN OF CARE
/74 (BP Location: Left arm, Cuff Size: Adult Regular)   Pulse 71   Temp 97.6  F (36.4  C) (Oral)   Resp 18   SpO2 97%    Neuro: alert/ox4.  ?Diet:?Regular, missing teeth, soft diet. Poor appetite. Reports difficulty swallowing at times, varies.Typically longstanding TPN and lipids, but awaiting RD/MD decision on resuming  ??Nausea:? PRN zofran and carafate ?  Pain:  Fentanyl patch left shoulders,prn oxy. Active with pain team  ?:  +BS, ELISA-SHAH intact. Use for venting prn  Musculoskeletal:  generalized weakness  Respiratory: Unlabored breathing  Cardiac:   WNL  Activity:??Independent in room?and up in halls  Skin/Wounds/LDAs:??Left PIV intact and tko between infusions of antibiotics.?Skin warm, dry and elastic.?  Events of shift:??Patient slept between cares.??Pt has had good resutls with all PRN medications, no adverse s/sx from medications this shift. Premedicated with benadryl prior to administration of IV vanco.       Goal Outcome Evaluation:  Plan of Care Reviewed With: patient, spouse  Overall Patient Progress: no change  Plan: Continue to follow POC. Update provider with changes.  Outcome Evaluation: Patient admitted from ED for bacteremia.  IV vanco and cefapime x2 administered as well as prn oxycodone x2, zofran po x1, and xanax x1. Patient off the unit x2 for smoking. Educated on leaving unit without IV pole and wrapped PIV. See flowsheets for complete assessment and VS.      Plan:  Continue to follow POC. Update provider with changes..

## 2024-01-21 NOTE — PROGRESS NOTES
[facility]    Medicine Progress Note - Hospitalist Service, GOLD TEAM 8    Date of Admission:  1/19/2024    Assessment & Plan   Parker Acevedo is a 53 year old male with a past medical history of Celestino-En-Y (2002) complicated by repeated ab surgeries resulting in short gut syndrome, severe malnutrition on chronic TPN via Cm, dysphagia, anxiety, recurrent CLABSI, and LUE non-purulent cellulitis and catheter-associated septic subclavian DVT (on Lovenox) who presented for recurrent bacteremia.    Today  -Continue cefepime and vancomycin (noted patient was refusing some abx last night due to not being able to smoke)  -Infectious disease consult. Query appropriate antibiotics, length of treatment, and line management  -Regarding his smoking I counseled him that while the hospital discourages smoking he is able to leave the unit as he wishes to go outside. Not interested in nicotine replacement therapy  -Noted hemoglobin drop from 9.9 to 8.2, all cell lines are down arguing for dilution (albeit large). Will continue to monitor    #Bacteremia, gram pos cocci and gram negative bacilli  #History of recurrent CLABSI from indwelling Cm line  -Has had multiple episodes of Klebsiella bactermia requiring cm exchange, last replaced 11/2023  -PTA felt sick for a week, had fever to 103.2F. Went to [place] ED on 1/18, blood cultures positive   -Growing E. Coli and E faecalis   -Repeat cultures continue to grow cultures. Noted patient had refused some antibiotics prior to this  -Empirically covered on cefepime and vancomycin  -Infectious disease consult pending  -Anticipate he will need cm exchanged after bacteremia clears    #Chronic TPN  #Short gut syndrome  #Severe malnutrition  #J tube (used for venting)  #Reflux   - PPN when able to start (runs for 12 hours); hold lipids if insufficient IV access  - Normal diet per patient preference (discussed bariatric  diet)  - Continue PTA pantoprazole   -Nutrition consult     Chronic pain  - PTA fentanyl patch  - PTA oxycodone      Anemia, chronic: Monitoring  Tobacco use disorder: Defers supportive aids  Anxiety: PTA lorazepam at bedtime  ADHD: PTA Adderall  Paroxysmal AFib: Holding PTA carvedilol           Diet: Regular Diet AdultBariatric diet, holding TPN  DVT Prophylaxis: Enoxaparin (Lovenox) SQ  Sanchez Catheter: Not present  Lines: PRESENT             Cardiac Monitoring: None  Code Status: Full Code      Clinically Significant Risk Factors          # Hypocalcemia: Lowest Ca = 8.4 mg/dL in last 2 days, will monitor and replace as appropriate                    # Financial/Environmental Concerns:           Disposition Plan     Expected Discharge Date: 01/21/2024                    Francis Marsh MD  Hospitalist Service, [address]  Securely message with [employer] (more info)  Text page via [facility] Paging/Directory   See signed in provider for up to date coverage information  ______________________________________________________________________    Interval History   -No acute events overnight, transferred to floor overnight  -Feels well today, has no complaints    Physical Exam   Vital Signs: Temp: 97.6  F (36.4  C) Temp src: Oral BP: 115/74 Pulse: 71   Resp: 18 SpO2: 97 % O2 Device: None (Room air)    Weight: 0 lbs 0 oz  Exam  Gen: Awake and alert, appears comfortable, appears stated age.  HEENT: NCAT, sclerae anicteric.  CV: Regular rhythm and rate, S1/S2, extremities warm and well perfused, no lower extremity edema.  Chest: Cm on left side of chest. No surrounding erythema or drainage.  Pulm: Normal work of breathing, lungs clear to auscultation, no crackles or wheezing.  GI: Nontender, nondistended. Has J tube in LUQ, clean without surrounding erythema  Skin: Warm, dry, no jaundice.  Neuro: AOx3, speech normal, moves all extremities symmetrically.    Medical  Decision Making       40 MINUTES SPENT BY ME on the date of service doing chart review, history, exam, documentation & further activities per the note.       Data     I have personally reviewed the following data over the past 24 hrs:    7.6  \   8.2 (L)   / 250     141 108 (H) 10.9 /  87   4.5 26 0.77 \       Imaging results reviewed over the past 24 hrs:   No results found for this or any previous visit (from the past 24 hour(s)).

## 2024-01-22 ENCOUNTER — APPOINTMENT (OUTPATIENT)
Dept: CARDIOLOGY | Facility: CLINIC | Age: 54
DRG: 315 | End: 2024-01-22
Attending: STUDENT IN AN ORGANIZED HEALTH CARE EDUCATION/TRAINING PROGRAM
Payer: COMMERCIAL

## 2024-01-22 ENCOUNTER — PATIENT OUTREACH (OUTPATIENT)
Dept: CARE COORDINATION | Facility: CLINIC | Age: 54
End: 2024-01-22
Payer: COMMERCIAL

## 2024-01-22 ENCOUNTER — APPOINTMENT (OUTPATIENT)
Dept: INTERVENTIONAL RADIOLOGY/VASCULAR | Facility: CLINIC | Age: 54
DRG: 315 | End: 2024-01-22
Attending: PHYSICIAN ASSISTANT
Payer: COMMERCIAL

## 2024-01-22 LAB
ALBUMIN UR-MCNC: NEGATIVE MG/DL
ANION GAP SERPL CALCULATED.3IONS-SCNC: 9 MMOL/L (ref 7–15)
APPEARANCE UR: CLEAR
BACTERIA BLD CULT: ABNORMAL
BILIRUB UR QL STRIP: NEGATIVE
BUN SERPL-MCNC: 11 MG/DL (ref 6–20)
CALCIUM SERPL-MCNC: 8.4 MG/DL (ref 8.6–10)
CHLORIDE SERPL-SCNC: 106 MMOL/L (ref 98–107)
COLOR UR AUTO: ABNORMAL
CREAT SERPL-MCNC: 0.79 MG/DL (ref 0.67–1.17)
DEPRECATED HCO3 PLAS-SCNC: 23 MMOL/L (ref 22–29)
EGFRCR SERPLBLD CKD-EPI 2021: >90 ML/MIN/1.73M2
ERYTHROCYTE [DISTWIDTH] IN BLOOD BY AUTOMATED COUNT: 18.6 % (ref 10–15)
GLUCOSE SERPL-MCNC: 92 MG/DL (ref 70–99)
GLUCOSE UR STRIP-MCNC: NEGATIVE MG/DL
HCT VFR BLD AUTO: 29.8 % (ref 40–53)
HGB BLD-MCNC: 8.7 G/DL (ref 13.3–17.7)
HGB UR QL STRIP: NEGATIVE
INR PPP: 1.1 (ref 0.85–1.15)
KETONES UR STRIP-MCNC: NEGATIVE MG/DL
LEUKOCYTE ESTERASE UR QL STRIP: NEGATIVE
LVEF ECHO: NORMAL
MCH RBC QN AUTO: 23.6 PG (ref 26.5–33)
MCHC RBC AUTO-ENTMCNC: 29.2 G/DL (ref 31.5–36.5)
MCV RBC AUTO: 81 FL (ref 78–100)
MUCOUS THREADS #/AREA URNS LPF: PRESENT /LPF
NITRATE UR QL: NEGATIVE
PH UR STRIP: 6 [PH] (ref 5–7)
PLATELET # BLD AUTO: 253 10E3/UL (ref 150–450)
POTASSIUM SERPL-SCNC: 4.3 MMOL/L (ref 3.4–5.3)
RBC # BLD AUTO: 3.69 10E6/UL (ref 4.4–5.9)
RBC URINE: 2 /HPF
SODIUM SERPL-SCNC: 138 MMOL/L (ref 135–145)
SP GR UR STRIP: 1.02 (ref 1–1.03)
UROBILINOGEN UR STRIP-MCNC: NORMAL MG/DL
WBC # BLD AUTO: 8.9 10E3/UL (ref 4–11)
WBC URINE: 1 /HPF

## 2024-01-22 PROCEDURE — 250N000013 HC RX MED GY IP 250 OP 250 PS 637: Performed by: NURSE PRACTITIONER

## 2024-01-22 PROCEDURE — 120N000002 HC R&B MED SURG/OB UMMC

## 2024-01-22 PROCEDURE — 258N000003 HC RX IP 258 OP 636: Performed by: STUDENT IN AN ORGANIZED HEALTH CARE EDUCATION/TRAINING PROGRAM

## 2024-01-22 PROCEDURE — 36589 REMOVAL TUNNELED CV CATH: CPT

## 2024-01-22 PROCEDURE — 99233 SBSQ HOSP IP/OBS HIGH 50: CPT | Performed by: STUDENT IN AN ORGANIZED HEALTH CARE EDUCATION/TRAINING PROGRAM

## 2024-01-22 PROCEDURE — 999N000248 HC STATISTIC IV INSERT WITH US BY RN

## 2024-01-22 PROCEDURE — 87040 BLOOD CULTURE FOR BACTERIA: CPT | Performed by: STUDENT IN AN ORGANIZED HEALTH CARE EDUCATION/TRAINING PROGRAM

## 2024-01-22 PROCEDURE — 36415 COLL VENOUS BLD VENIPUNCTURE: CPT | Performed by: PHYSICIAN ASSISTANT

## 2024-01-22 PROCEDURE — 250N000011 HC RX IP 250 OP 636

## 2024-01-22 PROCEDURE — 36415 COLL VENOUS BLD VENIPUNCTURE: CPT | Performed by: STUDENT IN AN ORGANIZED HEALTH CARE EDUCATION/TRAINING PROGRAM

## 2024-01-22 PROCEDURE — 02PYX3Z REMOVAL OF INFUSION DEVICE FROM GREAT VESSEL, EXTERNAL APPROACH: ICD-10-PCS

## 2024-01-22 PROCEDURE — 87070 CULTURE OTHR SPECIMN AEROBIC: CPT | Performed by: PHYSICIAN ASSISTANT

## 2024-01-22 PROCEDURE — 250N000013 HC RX MED GY IP 250 OP 250 PS 637

## 2024-01-22 PROCEDURE — 85027 COMPLETE CBC AUTOMATED: CPT | Performed by: STUDENT IN AN ORGANIZED HEALTH CARE EDUCATION/TRAINING PROGRAM

## 2024-01-22 PROCEDURE — 250N000011 HC RX IP 250 OP 636: Performed by: STUDENT IN AN ORGANIZED HEALTH CARE EDUCATION/TRAINING PROGRAM

## 2024-01-22 PROCEDURE — 85610 PROTHROMBIN TIME: CPT | Performed by: PHYSICIAN ASSISTANT

## 2024-01-22 PROCEDURE — 93306 TTE W/DOPPLER COMPLETE: CPT

## 2024-01-22 PROCEDURE — 82374 ASSAY BLOOD CARBON DIOXIDE: CPT | Performed by: STUDENT IN AN ORGANIZED HEALTH CARE EDUCATION/TRAINING PROGRAM

## 2024-01-22 PROCEDURE — 36592 COLLECT BLOOD FROM PICC: CPT | Performed by: STUDENT IN AN ORGANIZED HEALTH CARE EDUCATION/TRAINING PROGRAM

## 2024-01-22 PROCEDURE — 81001 URINALYSIS AUTO W/SCOPE: CPT | Performed by: INTERNAL MEDICINE

## 2024-01-22 PROCEDURE — 93306 TTE W/DOPPLER COMPLETE: CPT | Mod: 26 | Performed by: INTERNAL MEDICINE

## 2024-01-22 PROCEDURE — 250N000011 HC RX IP 250 OP 636: Performed by: EMERGENCY MEDICINE

## 2024-01-22 PROCEDURE — 250N000013 HC RX MED GY IP 250 OP 250 PS 637: Performed by: STUDENT IN AN ORGANIZED HEALTH CARE EDUCATION/TRAINING PROGRAM

## 2024-01-22 RX ORDER — FENTANYL 25 UG/1
25 PATCH TRANSDERMAL
Status: DISCONTINUED | OUTPATIENT
Start: 2024-01-22 | End: 2024-01-25 | Stop reason: HOSPADM

## 2024-01-22 RX ADMIN — DIPHENHYDRAMINE HYDROCHLORIDE 25 MG: 50 INJECTION, SOLUTION INTRAMUSCULAR; INTRAVENOUS at 11:21

## 2024-01-22 RX ADMIN — OXYCODONE HYDROCHLORIDE 10 MG: 5 SOLUTION ORAL at 21:44

## 2024-01-22 RX ADMIN — CARVEDILOL 12.5 MG: 12.5 TABLET, FILM COATED ORAL at 18:02

## 2024-01-22 RX ADMIN — ACETAMINOPHEN 650 MG: 325 SOLUTION ORAL at 02:16

## 2024-01-22 RX ADMIN — CEFEPIME 2 G: 2 INJECTION, POWDER, FOR SOLUTION INTRAVENOUS at 08:31

## 2024-01-22 RX ADMIN — ONDANSETRON 8 MG: 8 TABLET, ORALLY DISINTEGRATING ORAL at 06:49

## 2024-01-22 RX ADMIN — ALPRAZOLAM 0.5 MG: 0.5 TABLET ORAL at 10:42

## 2024-01-22 RX ADMIN — CEFEPIME 2 G: 2 INJECTION, POWDER, FOR SOLUTION INTRAVENOUS at 18:02

## 2024-01-22 RX ADMIN — PREGABALIN 50 MG: 50 CAPSULE ORAL at 08:27

## 2024-01-22 RX ADMIN — CARVEDILOL 12.5 MG: 12.5 TABLET, FILM COATED ORAL at 08:27

## 2024-01-22 RX ADMIN — DEXTROAMPHETAMINE SACCHARATE, AMPHETAMINE ASPARTATE, DEXTROAMPHETAMINE SULFATE AND AMPHETAMINE SULFATE 20 MG: 2.5; 2.5; 2.5; 2.5 TABLET ORAL at 08:27

## 2024-01-22 RX ADMIN — OXYCODONE HYDROCHLORIDE 10 MG: 5 SOLUTION ORAL at 06:49

## 2024-01-22 RX ADMIN — ACETAMINOPHEN 650 MG: 325 SOLUTION ORAL at 10:42

## 2024-01-22 RX ADMIN — FENTANYL 1 PATCH: 25 PATCH TRANSDERMAL at 12:15

## 2024-01-22 RX ADMIN — ACETAMINOPHEN 650 MG: 325 SOLUTION ORAL at 06:48

## 2024-01-22 RX ADMIN — VANCOMYCIN HYDROCHLORIDE 1500 MG: 500 INJECTION, POWDER, LYOPHILIZED, FOR SOLUTION INTRAVENOUS at 00:58

## 2024-01-22 RX ADMIN — PANTOPRAZOLE SODIUM 40 MG: 40 TABLET, DELAYED RELEASE ORAL at 08:27

## 2024-01-22 RX ADMIN — ENOXAPARIN SODIUM 80 MG: 80 INJECTION SUBCUTANEOUS at 08:28

## 2024-01-22 RX ADMIN — ALPRAZOLAM 0.5 MG: 0.5 TABLET ORAL at 21:44

## 2024-01-22 RX ADMIN — PREGABALIN 50 MG: 50 CAPSULE ORAL at 21:44

## 2024-01-22 RX ADMIN — VANCOMYCIN HYDROCHLORIDE 1500 MG: 500 INJECTION, POWDER, LYOPHILIZED, FOR SOLUTION INTRAVENOUS at 12:17

## 2024-01-22 RX ADMIN — DIPHENHYDRAMINE HYDROCHLORIDE 25 MG: 50 INJECTION, SOLUTION INTRAMUSCULAR; INTRAVENOUS at 23:59

## 2024-01-22 RX ADMIN — OXYCODONE HYDROCHLORIDE 10 MG: 5 SOLUTION ORAL at 02:25

## 2024-01-22 RX ADMIN — ENOXAPARIN SODIUM 80 MG: 80 INJECTION SUBCUTANEOUS at 21:44

## 2024-01-22 RX ADMIN — POLYETHYLENE GLYCOL 3350 17 G: 17 POWDER, FOR SOLUTION ORAL at 08:28

## 2024-01-22 RX ADMIN — OXYCODONE HYDROCHLORIDE 10 MG: 5 SOLUTION ORAL at 14:53

## 2024-01-22 RX ADMIN — ONDANSETRON 8 MG: 8 TABLET, ORALLY DISINTEGRATING ORAL at 14:54

## 2024-01-22 RX ADMIN — OXYCODONE HYDROCHLORIDE 10 MG: 5 SOLUTION ORAL at 10:42

## 2024-01-22 ASSESSMENT — ACTIVITIES OF DAILY LIVING (ADL)
ADLS_ACUITY_SCORE: 24

## 2024-01-22 NOTE — PROGRESS NOTES
Clinic Care Coordination Contact  Ambulatory Care Coordination to Inpatient Care Management   Hand-In Communication    Date:  January 22, 2024  Name: Parker Acevedo is enrolled in Ambulatory Care Coordination program and I am the Lead Care Coordinator.  CC Contact Information: Epic In"[employer]sket + phone  Payor Source: Payor: [facility] / Plan: [facility] MEDICARE ADVANTAGE / Product Type: Medicare /   Current services in place:     Please see the CC Snaphot and Care Management Flowsheets for specific  details of this Parker Acevedo care plan.   Additional details/specific concerns r/t this admission:    No additional concerns at this time .    I will follow this admission in Epic. Please feel free to contact me with questions or for further collaboration in discharge planning.    Kinsey Muhammad RN Care Coordination   [facility]  Email: [email or web address]  Phone: [phone]

## 2024-01-22 NOTE — PLAN OF CARE
Shift: [id number]    D: See flowsheets for full assessment & vitals    PRNs: zofran, oxycodone, xanax and tylenol for pain management w/ relief   Labs: no RN managed labs. Continuing to monitor blood cultures- no growth so far on last set  Running: L PIV SL, L port not using     A/R: no acute events this shift. Pt remains vitally stable on RA. ID consult completed w/ the plan to continue on empiric ABX, echo/RONEL ordered and plan to remove azevedo tomorrow in IR and have a line holiday. NPO at 0000. Pain well managed w/ current regimen. Vanco increased to 1500 mg, tolerated w/ benadryl given prior to infusion. Q1 hour rounding completed, call light w/in reach and able to make needs known, will continue to monitor     P: continue w/ poc     Goal Outcome Evaluation:      Plan of Care Reviewed With: patient    Overall Patient Progress: no changeOverall Patient Progress: no change    Outcome Evaluation: ID consulted, pain well managed, npo at 0000 for line removal in IR

## 2024-01-22 NOTE — PROCEDURES
[facility]    Procedure: IR Procedure Note    Date/Time: 1/22/2024 10:00 AM    Performed by: Juana Burgos PA-C  Authorized by: Juana Burgos PA-C      UNIVERSAL PROTOCOL   Site Marked: NA  Prior Images Obtained and Reviewed:  Yes  Required items: Required blood products, implants, devices and special equipment available    Patient identity confirmed:  Verbally with patient, arm band, provided demographic data and hospital-assigned identification number  Patient was reevaluated immediately before administering moderate or deep sedation or anesthesia  Confirmation Checklist:  Patient's identity using two indicators, relevant allergies, procedure was appropriate and matched the consent or emergent situation and correct equipment/implants were available  Time out: Immediately prior to the procedure a time out was called    Universal Protocol: the Joint Commission Universal Protocol was followed    Preparation: Patient was prepped and draped in usual sterile fashion       ANESTHESIA    Anesthesia:  Local infiltration  Local Anesthetic:  Lidocaine 1% without epinephrine      SEDATION    Patient Sedated: No    See dictated procedure note for full details.  Findings: N/A    : catheter tip sent for culture.    Complications: None    Condition: Stable    Plan: Follow-up per primary team. Keep head elevated and minimize bending at hip x 2 hrs.      PROCEDURE  Describe Procedure: Removal of left tunneled CVC in its entirety. Catheter sent to lab. Sterile dressing applied.  Patient Tolerance:  Patient tolerated the procedure well with no immediate complications  Length of time physician/provider present for 1:1 monitoring during sedation: 0

## 2024-01-22 NOTE — CONSULTS
[facility] Consult Service Note  Interventional Radiology  01/22/24   7:13 AM    Consult Requested: IR TCVC removal     Recommendations/Plan:    Patient is approved for IR tunneled CVC removal-LEFT.  Send tip for culture.   Timing of procedure is TBD based on IR staffing/schedule and triage.  Please contact the IR charge RN at [phone] for estimated time of procedure.     Labs WNL for procedure.  No .    Orders entered for procedure.  No NPO required.  Medications to be held include: No hold on lovenox   Informed consent will be completed prior to procedure.     Recommendations were reviewed with Dr. Francis Marsh MD    History of Present Illness:  Parker Acevedo is a 53 year old English speaking male with past medical history of Celestino-En-Y (2002) complicated by repeated ab surgeries resulting in short gut syndrome, severe malnutrition on chronic TPN via Cm, dysphagia, anxiety, recurrent CLABSI, and LUE non-purulent cellulitis and catheter-associated septic subclavian DVT (on Lovenox) who presented for recurrent CLABSI bacteremia.     Patient had presented to [place] ED with cultures      -Growing E. Coli and E faecalis, multiple cultures (patient refused some antibiotics early in course)   -Repeat cultures from 1/20 show no growth to date     Currently has LIJ TCVC placed on 11/17/2023 for removal.      Diagnostic labs to be entered by: Dr. Francis Marsh.       Pertinent Imaging Reviewed:       Expected date of discharge:  01/21/2024    Vitals:   /57 (BP Location: Left arm, Patient Position: Left side, Cuff Size: Adult Regular)   Pulse 91   Temp 98.8  F (37.1  C) (Oral)   Resp 18   SpO2 100%     Pertinent Labs Reviewed:  CBC:  Lab Results   Component Value Date    WBC 8.9 01/22/2024    WBC 7.6 01/21/2024    WBC 11.3 (H) 01/19/2024    WBC 6.9 06/29/2021    WBC 8.1 06/14/2021    WBC 7.5 06/09/2021     Lab Results   Component Value Date    HGB 8.7 01/22/2024    HGB 8.2 01/21/2024     HGB 9.9 01/19/2024    HGB 12.1 06/29/2021    HGB 12.0 06/14/2021    HGB 13.3 06/09/2021     Lab Results   Component Value Date     01/22/2024     01/21/2024     01/19/2024     06/29/2021     06/14/2021     06/09/2021    INR:  Lab Results   Component Value Date    INR 1.09 11/15/2023    INR 1.07 05/07/2021    PTT 25 09/29/2023    PTT 26 07/22/2019          COVID Results:  COVID-19 Antibody Results, Testing for Immunity           No data to display              COVID-19 PCR Results          2/24/2022    01:49 4/9/2022    10:58 5/7/2022    14:52 7/7/2022    13:19 7/16/2022    22:19 8/9/2022    18:06 8/4/2023    04:27 11/11/2023    01:30   COVID-19 PCR Results   SARS CoV2 PCR Negative  Negative  Negative  Negative  Negative  Negative  Negative  Negative          Karla Jeter PA-C  Interventional Radiology  Pager: [phone]

## 2024-01-22 NOTE — PLAN OF CARE
Goal Outcome Evaluation:      Plan of Care Reviewed With: patient    Overall Patient Progress: no change    Outcome Evaluation: Patient tolerated IV vanco infusion with   1500 mg/500 NaCl over 3 hours with minimal side effects.  LUE PIV removed as patient complained veins hurt and wanted removed. New LUE PIV placed and infused vanco through new PIV.  IV cefepime given.  PRNs for pain oxy and tylenol q4 as well as xanax at bedtime. PRN zofran x2 for nausea.  Anxious about what time they will pull his CVC today.   Patient up ad vitor and disconnected prior to leaving floor to smoke 5x during shift. Patient has been educated on potential risks of choosing to leave the unit and the responsibility for patient well-being will belong to the patient. Pt has been informed that admission to hospital is due to need for medical treatment. Education given to the pt on some of the potential risks included but are not limited to:  - Lack of access to nursing and medical intervention.   - Possible missed appointments with MDs, therapies and tests.  - Possible missed medications, antibiotics, management of IV's.      Plan: Continue to follow POC. Update provider with changes.

## 2024-01-22 NOTE — PROVIDER NOTIFICATION
Provider notified (name): Gold  8 CC  Reason for notification: High, 7C  Gold  8 CC, T.S Rm 7411 BP  137/92. Pt wants to resume  his coreg, he's worried usually 110/70's.  Wants to avoid headache, effects.   Please advice chanel Haider   [phone]  Recommendation/request given to provider: Can he get coreg resumed ?  Response from provider: Ordered coreg

## 2024-01-22 NOTE — PROGRESS NOTES
Patient Name: Parker Acevedo  Medical Record Number: [id number]  Today's Date: 1/22/2024    Procedure: Tunneled CVC removal  Proceduralist: Tanisha Gregorio PA-C  Pathology present: n/a    Procedure Start: 0954  Procedure end: 0956  Sedation medications administered: n/a     Report given to: Alfred HARRISON  : n/a    Other Notes: Pt arrived to IR room 7 from . Consent reviewed. Pt denies any questions or concerns regarding procedure. Pt positioned supine and monitored per protocol. Pt tolerated procedure without any noted complications. Pt transferred back to .

## 2024-01-22 NOTE — PROGRESS NOTES
[facility]    Medicine Progress Note - Hospitalist Service, GOLD TEAM 8    Date of Admission:  1/19/2024    Assessment & Plan   Parker Acevedo is a 53 year old male with a past medical history of Celestino-En-Y (2002) complicated by repeated ab surgeries resulting in short gut syndrome, severe malnutrition on chronic TPN via Cm, dysphagia, anxiety, recurrent CLABSI, and LUE non-purulent cellulitis and catheter-associated septic subclavian DVT (on Lovenox) who presented for recurrent bacteremia.    Today  -PTA coreg resumed overnight  -Adjusted fentanyl patch to q48 (reviewed outside pain notes, that is how they prescribed it intentionally)  -Continue cefepime and vancomycin  -TTE ordered, consider endocarditis  -IR removed cm this morning   -Line holiday for 48 hours then can replace (would be 1/24)  -Repeat blood cultures after cm removal  -Infectious diseases is following, their help is appreciated    #Bacteremia, gram pos cocci and gram negative bacilli  #History of recurrent CLABSI from indwelling Cm line  -Has had multiple episodes of Klebsiella bactermia requiring cm exchange, last replaced 11/2023  -PTA felt sick for a week, had fever to 103.2F. Went to [place] ED on 1/18, blood cultures positive   -Growing E. Coli and E faecalis, multiple cultures (patient refused some antibiotics early in course)   -Repeat cultures from 1/20 show no growth to date  -Empirically covered on cefepime and vancomycin  -TTE ordered, consider endocarditis  -Cm removed on 1/22, 48 hour line holiday can replace on 1/24  -Infectious diseases is following, their help is appreciated    #Chronic TPN  #Short gut syndrome  #Severe malnutrition  #J tube (used for venting)  #Reflux   - PPN when able to start (runs for 12 hours); hold lipids if insufficient IV access  - Normal diet per patient preference (discussed bariatric diet)  - Continue PTA pantoprazole    -Nutrition consult     Chronic pain  - PTA fentanyl patch  - PTA oxycodone      Anemia, chronic: Monitoring  Tobacco use disorder: Defers supportive aids  Anxiety: PTA lorazepam at bedtime  ADHD: PTA Adderall  Paroxysmal AFib: PTA carvedilol           Diet: Regular Diet AdultBariatric diet, holding TPN  DVT Prophylaxis: Enoxaparin (Lovenox) SQ  Sanchez Catheter: Not present  Lines: None       Cardiac Monitoring: None  Code Status: Full Code      Clinically Significant Risk Factors                             # Financial/Environmental Concerns: none         Disposition Plan      Expected Discharge Date: 01/25/2024      Destination: home with family;home with help/services  Discharge Comments: Discharge on 1/24 to 1/25 pending replacement of cm, resumption of TPN  Anticipate he will need home IV antibiotics (unable to place order until sure which antibiotic and length of treatment)          Francis Marsh MD  Hospitalist Service, [address]  Securely message with [employer] (more info)  Text page via [employer] Paging/Directory   See signed in provider for up to date coverage information  ______________________________________________________________________    Interval History   -No acute events overnight  -Today he feels well  -Aware he will be here for a few days    Physical Exam   Vital Signs: Temp: 98.8  F (37.1  C) Temp src: Oral BP: 123/82 Pulse: 72   Resp: 18 SpO2: 100 % O2 Device: None (Room air)    Weight: 0 lbs 0 oz  Exam  Gen: Awake and alert, appears comfortable, appears stated age.   HEENT: NCAT, sclerae anicteric.  CV: Regular rhythm and rate, S1/S2, extremities warm and well perfused, no lower extremity edema.  Chest: Cm on left side of chest. No surrounding erythema or drainage.  Pulm: Normal work of breathing, lungs clear to auscultation, no crackles or wheezing.  GI: Nontender, nondistended. Has J tube in LUQ, clean without surrounding  erythema  Skin: Warm, dry, no jaundice.  Neuro: AOx3, speech normal, moves all extremities symmetrically.    Medical Decision Making       50 MINUTES SPENT BY ME on the date of service doing chart review, history, exam, documentation & further activities per the note.        Data     I have personally reviewed the following data over the past 24 hrs:    8.9  \   8.7 (L)   / 253     138 106 11.0 /  92   4.3 23 0.79 \     INR:  1.10 PTT:  N/A   D-dimer:  N/A Fibrinogen:  N/A       Imaging results reviewed over the past 24 hrs:   Recent Results (from the past 24 hour(s))   IR CVC Tunnel Removal Left    Narrative    DIAGNOSIS: bacteremia; short gut syndrome    PROCEDURE: IR CVC TUNNEL REMOVAL LEFT     Impression    IMPRESSION: Completed removal of left tunneled 6 Fr, 27.5 cm central  venous catheter in its entirety. There were no complications. Catheter  tip sent to lab.    ----------    CLINICAL HISTORY: The patient has a tunneled central venous catheter  placed 11/17/23. Patient presents for removal due to bacteremia.    PERFORMED BY: Aiyana Burgos PA-C    MEDICATIONS: none    DESCRIPTION: Physical examination demonstrated no erythema,  tenderness, fluctuance, or discharge at the catheter exit site or  along the tract. The catheter and its entry site into the skin was  prepped and draped in usual sterile fashion.     Using steady gentle traction, the catheter and cuff were freed from  the subcutaneous tissue, and the entire catheter was removed without  difficulty. Compression was applied over the venipuncture site, as  well as along the tract, until good hemostasis was achieved. A sterile  dressing was applied to the skin at the exit site.    COMPLICATIONS:  No immediate concerns; the patient remained stable  throughout the procedure and tolerated it well.    ESTIMATED BLOOD LOSS:  Minimal    SPECIMENS: Catheter tip sent to lab    TIME:  A total of 15 minutes was spent with the patient. Greater than  50% of the time  was spent in counseling, education, and coordination  of care.    ESTEFANI ALLEN PA-C         SYSTEM ID:  [id number]

## 2024-01-23 LAB
ANION GAP SERPL CALCULATED.3IONS-SCNC: 12 MMOL/L (ref 7–15)
BACTERIA BLD CULT: NO GROWTH
BUN SERPL-MCNC: 10.7 MG/DL (ref 6–20)
CALCIUM SERPL-MCNC: 9.1 MG/DL (ref 8.6–10)
CHLORIDE SERPL-SCNC: 102 MMOL/L (ref 98–107)
CREAT SERPL-MCNC: 0.75 MG/DL (ref 0.67–1.17)
DEPRECATED HCO3 PLAS-SCNC: 26 MMOL/L (ref 22–29)
EGFRCR SERPLBLD CKD-EPI 2021: >90 ML/MIN/1.73M2
ERYTHROCYTE [DISTWIDTH] IN BLOOD BY AUTOMATED COUNT: 18.6 % (ref 10–15)
GLUCOSE SERPL-MCNC: 88 MG/DL (ref 70–99)
HCT VFR BLD AUTO: 32.8 % (ref 40–53)
HGB BLD-MCNC: 9.6 G/DL (ref 13.3–17.7)
MCH RBC QN AUTO: 23.5 PG (ref 26.5–33)
MCHC RBC AUTO-ENTMCNC: 29.3 G/DL (ref 31.5–36.5)
MCV RBC AUTO: 80 FL (ref 78–100)
PLATELET # BLD AUTO: 277 10E3/UL (ref 150–450)
POTASSIUM SERPL-SCNC: 3.7 MMOL/L (ref 3.4–5.3)
RBC # BLD AUTO: 4.08 10E6/UL (ref 4.4–5.9)
SODIUM SERPL-SCNC: 140 MMOL/L (ref 135–145)
VANCOMYCIN SERPL-MCNC: 16 UG/ML
WBC # BLD AUTO: 8.5 10E3/UL (ref 4–11)

## 2024-01-23 PROCEDURE — 999N000248 HC STATISTIC IV INSERT WITH US BY RN

## 2024-01-23 PROCEDURE — 99232 SBSQ HOSP IP/OBS MODERATE 35: CPT | Performed by: INTERNAL MEDICINE

## 2024-01-23 PROCEDURE — 80202 ASSAY OF VANCOMYCIN: CPT | Performed by: STUDENT IN AN ORGANIZED HEALTH CARE EDUCATION/TRAINING PROGRAM

## 2024-01-23 PROCEDURE — 999N000203 HC STATISTICAL VASC ACCESS NURSE TIME, 16-31 MINUTES

## 2024-01-23 PROCEDURE — 85027 COMPLETE CBC AUTOMATED: CPT | Performed by: STUDENT IN AN ORGANIZED HEALTH CARE EDUCATION/TRAINING PROGRAM

## 2024-01-23 PROCEDURE — 120N000002 HC R&B MED SURG/OB UMMC

## 2024-01-23 PROCEDURE — 36415 COLL VENOUS BLD VENIPUNCTURE: CPT | Performed by: STUDENT IN AN ORGANIZED HEALTH CARE EDUCATION/TRAINING PROGRAM

## 2024-01-23 PROCEDURE — 250N000013 HC RX MED GY IP 250 OP 250 PS 637: Performed by: INTERNAL MEDICINE

## 2024-01-23 PROCEDURE — 258N000003 HC RX IP 258 OP 636: Performed by: STUDENT IN AN ORGANIZED HEALTH CARE EDUCATION/TRAINING PROGRAM

## 2024-01-23 PROCEDURE — 250N000011 HC RX IP 250 OP 636: Performed by: EMERGENCY MEDICINE

## 2024-01-23 PROCEDURE — 250N000011 HC RX IP 250 OP 636: Performed by: STUDENT IN AN ORGANIZED HEALTH CARE EDUCATION/TRAINING PROGRAM

## 2024-01-23 PROCEDURE — 250N000011 HC RX IP 250 OP 636

## 2024-01-23 PROCEDURE — 250N000013 HC RX MED GY IP 250 OP 250 PS 637: Performed by: NURSE PRACTITIONER

## 2024-01-23 PROCEDURE — 80048 BASIC METABOLIC PNL TOTAL CA: CPT | Performed by: STUDENT IN AN ORGANIZED HEALTH CARE EDUCATION/TRAINING PROGRAM

## 2024-01-23 PROCEDURE — 250N000013 HC RX MED GY IP 250 OP 250 PS 637

## 2024-01-23 RX ORDER — OXYCODONE HCL 5 MG/5 ML
15 SOLUTION, ORAL ORAL EVERY 4 HOURS PRN
Status: DISCONTINUED | OUTPATIENT
Start: 2024-01-23 | End: 2024-01-25

## 2024-01-23 RX ORDER — LIDOCAINE 40 MG/G
CREAM TOPICAL
Status: DISCONTINUED | OUTPATIENT
Start: 2024-01-23 | End: 2024-01-25 | Stop reason: HOSPADM

## 2024-01-23 RX ADMIN — ALPRAZOLAM 0.5 MG: 0.5 TABLET ORAL at 08:59

## 2024-01-23 RX ADMIN — ENOXAPARIN SODIUM 80 MG: 80 INJECTION SUBCUTANEOUS at 20:45

## 2024-01-23 RX ADMIN — CARVEDILOL 12.5 MG: 12.5 TABLET, FILM COATED ORAL at 08:45

## 2024-01-23 RX ADMIN — PANTOPRAZOLE SODIUM 40 MG: 40 TABLET, DELAYED RELEASE ORAL at 08:45

## 2024-01-23 RX ADMIN — CARVEDILOL 12.5 MG: 12.5 TABLET, FILM COATED ORAL at 17:44

## 2024-01-23 RX ADMIN — POLYETHYLENE GLYCOL 3350 17 G: 17 POWDER, FOR SOLUTION ORAL at 08:45

## 2024-01-23 RX ADMIN — OXYCODONE HYDROCHLORIDE 15 MG: 5 SOLUTION ORAL at 13:55

## 2024-01-23 RX ADMIN — CEFEPIME 2 G: 2 INJECTION, POWDER, FOR SOLUTION INTRAVENOUS at 17:37

## 2024-01-23 RX ADMIN — PREGABALIN 50 MG: 50 CAPSULE ORAL at 08:45

## 2024-01-23 RX ADMIN — VANCOMYCIN HYDROCHLORIDE 1750 MG: 10 INJECTION, POWDER, LYOPHILIZED, FOR SOLUTION INTRAVENOUS at 13:18

## 2024-01-23 RX ADMIN — ACETAMINOPHEN 650 MG: 325 SOLUTION ORAL at 06:34

## 2024-01-23 RX ADMIN — ACETAMINOPHEN 650 MG: 325 SOLUTION ORAL at 21:56

## 2024-01-23 RX ADMIN — DIPHENHYDRAMINE HYDROCHLORIDE 25 MG: 50 INJECTION, SOLUTION INTRAMUSCULAR; INTRAVENOUS at 12:44

## 2024-01-23 RX ADMIN — OXYCODONE HYDROCHLORIDE 10 MG: 5 SOLUTION ORAL at 06:34

## 2024-01-23 RX ADMIN — OXYCODONE HYDROCHLORIDE 15 MG: 5 SOLUTION ORAL at 17:41

## 2024-01-23 RX ADMIN — ALPRAZOLAM 0.5 MG: 0.5 TABLET ORAL at 22:00

## 2024-01-23 RX ADMIN — DEXTROAMPHETAMINE SACCHARATE, AMPHETAMINE ASPARTATE, DEXTROAMPHETAMINE SULFATE AND AMPHETAMINE SULFATE 20 MG: 2.5; 2.5; 2.5; 2.5 TABLET ORAL at 08:45

## 2024-01-23 RX ADMIN — ENOXAPARIN SODIUM 80 MG: 80 INJECTION SUBCUTANEOUS at 08:48

## 2024-01-23 RX ADMIN — PREGABALIN 50 MG: 50 CAPSULE ORAL at 20:46

## 2024-01-23 RX ADMIN — OXYCODONE HYDROCHLORIDE 10 MG: 5 SOLUTION ORAL at 02:02

## 2024-01-23 RX ADMIN — VANCOMYCIN HYDROCHLORIDE 1500 MG: 500 INJECTION, POWDER, LYOPHILIZED, FOR SOLUTION INTRAVENOUS at 00:31

## 2024-01-23 RX ADMIN — OXYCODONE HYDROCHLORIDE 15 MG: 5 SOLUTION ORAL at 21:57

## 2024-01-23 RX ADMIN — ONDANSETRON 8 MG: 8 TABLET, ORALLY DISINTEGRATING ORAL at 17:44

## 2024-01-23 RX ADMIN — ACETAMINOPHEN 650 MG: 325 SOLUTION ORAL at 17:41

## 2024-01-23 ASSESSMENT — ACTIVITIES OF DAILY LIVING (ADL)
ADLS_ACUITY_SCORE: 24

## 2024-01-23 NOTE — PROGRESS NOTES
Vascular Access Services Notes:    Ordering provider was OK to have VAS insert a new PIV as long as the pt is agreeable.       YADIRA TaborN, RN [facility]

## 2024-01-23 NOTE — PROGRESS NOTES
Received order to place PIV on the left arm. Patient is complaining of pain on both arms. Redness also noted around the right PIV and left upper forearm where the previous IV was inserted. Attempted to place PIV on the left lower forearm but the patient is complaining that it is painful, IV removed. Patient is receiving Vancomycin and Cefepime. Bedside RN aware.

## 2024-01-23 NOTE — PLAN OF CARE
"Goal Outcome Evaluation:      Plan of Care Reviewed With: patient    Overall Patient Progress: no changeOverall Patient Progress: no change     A&Ox4, VSS on RA. IR removal Cm. Iv vanco continued, premedicated prior to vanco. Nauseous, Zofran effective. Pain well managed with oxy and tylenol. Independent in room. Voiding okay. 2 BM. PIV SL between abx. Pt goes outside to \"smoke\". Pt educated on potential risks of going outside and stated understanding of his responsibility. No acute changes. POC continued.       "

## 2024-01-23 NOTE — PLAN OF CARE
Goal Outcome Evaluation:    8230-0434     /79 (BP Location: Left arm)   Pulse 83   Temp 99.1  F (37.3  C) (Oral)   Resp 16   SpO2 100%       Neuro: A&Ox4.   Cardiac: SR. VSS.   Respiratory: Sating 100% on RA.  GI/: No bm. Adequate urine output.   Diet/appetite: Pt is not eating. He is drinking coffee.  Activity: Independent , up to chair and in halls.  Pain: At acceptable level on current regimen.   Skin: No new deficits noted.  LDA's:New PIV, infusing Vancomycin over three hours.    Plan: Continue with POC. Notify primary team with changes.

## 2024-01-23 NOTE — PLAN OF CARE
Goal Outcome Evaluation:                 Outcome Evaluation: Pt AOx4 VSS on RA. Extravasation on both arms from vanco doses. Vascular contacted pt refused new PIV and stated he does not want a PIV unless it is a midline MD notified. Up indpendent in room. Pain being manage by current regimen (see mar). Will contine with plan of care.

## 2024-01-23 NOTE — PHARMACY-VANCOMYCIN DOSING SERVICE
Pharmacy Vancomycin Note  Date of Service 2024  Patient's  1970   53 year old, male    Indication: Bacteremia  Day of Therapy: 5  Current vancomycin regimen:  1500 mg IV q12h  Current vancomycin monitoring method: AUC  Current vancomycin therapeutic monitoring goal: 400-600 mg*h/L    Insight[employer] Prediction of Current Vancomycin Regimen  Regimen: 1500 mg IV every 12 hours.  Start time: 12:31 on 2024  Exposure target: AUC24 (range)400-600 mg/L.hr   AUC24,ss: 412 mg/L.hr  Probability of AUC24 > 400: 58 %  Ctrough,ss: 10.6 mg/L  Probability of Ctrough,ss > 20: 1 %  Probability of nephrotoxicity (Lodise CARMELA ): 6 %    Current estimated CrCl = Estimated Creatinine Clearance: 141.2 mL/min (based on SCr of 0.75 mg/dL).    Creatinine for last 3 days  2024:  5:50 AM Creatinine 0.77 mg/dL  2024:  6:35 AM Creatinine 0.79 mg/dL  2024:  6:26 AM Creatinine 0.75 mg/dL    Recent Vancomycin Levels (past 3 days)  2024:  9:52 AM Vancomycin 10.1 ug/mL  2024:  6:26 AM Vancomycin 16.0 ug/mL    Vancomycin IV Administrations (past 72 hours)                     vancomycin (VANCOCIN) 1,500 mg in sodium chloride 0.9 % 500 mL intermittent infusion (mg) 1,500 mg New Bag 24 0031     1,500 mg New Bag 24 1217     1,500 mg New Bag  0058    vancomycin (VANCOCIN) 1,500 mg in 0.9% NaCl 250 mL intermittent infusion (mg) 1,500 mg New Bag 24 1302    vancomycin (VANCOCIN) 1,250 mg in 0.9% NaCl 250 mL intermittent infusion (mg) 1,250 mg New Bag 24 2337                    Nephrotoxins and other renal medications (From now, onward)      Start     Dose/Rate Route Frequency Ordered Stop    24 1200  vancomycin (VANCOCIN) 1,750 mg in sodium chloride 0.9 % 500 mL intermittent infusion         1,750 mg  over 180 Minutes Intravenous EVERY 12 HOURS 24 0833                 Contrast Orders - past 72 hours (72h ago, onward)      None            Patient has bacteremia with blood  cultures positive for E. coli and E. faecalis. Patient is currently receiving cefepime based on susceptibility results for E. coli. Vancomycin is used based on E. faecalis susceptibility results.    Interpretation of levels and current regimen:  Vancomycin level is reflective of -600, however the ELISA of the Enterococcus faecalis isolated from the blood is 2 microorganisms/mL which is on the higher end of what is considered susceptible. Therefore, we will aim for higher than the bottom end of the range for AUC during treatment.    Has serum creatinine changed greater than 50% in last 72 hours: No    Urine output:  unable to determine    Renal Function: Stable    InsightRX Prediction of Planned New Vancomycin Regimen  Regimen: 1750 mg IV every 12 hours.  Start time: 12:31 on 01/23/2024  Exposure target: AUC24 (range)400-600 mg/L.hr   AUC24,ss: 481 mg/L.hr  Probability of AUC24 > 400: 89 %  Ctrough,ss: 11.9 mg/L  Probability of Ctrough,ss > 20: 3 %  Probability of nephrotoxicity (Lodise CARMELA 2009): 7 %    Plan:  Increase Dose to 1750 mg IV q12h  Vancomycin monitoring method: AUC  Vancomycin therapeutic monitoring goal: 400-600 mg*h/L  Pharmacy will check vancomycin levels as appropriate in 1-3 Days.  Serum creatinine levels will be ordered daily for the first week of therapy and at least twice weekly for subsequent weeks.    Prakash Brown

## 2024-01-23 NOTE — PROGRESS NOTES
[facility]    Medicine Progress Note - Hospitalist Service, GOLD TEAM 8    Date of Admission:  1/19/2024    Assessment & Plan   Parker Acevedo is a 53 year old male with a past medical history of Celestino-En-Y (2002) complicated by repeated ab surgeries resulting in short gut syndrome, severe malnutrition on chronic TPN via Cm, dysphagia, anxiety, recurrent CLABSI, and LUE non-purulent cellulitis and catheter-associated septic subclavian DVT (on Lovenox) who presented for recurrent bacteremia.    Today  -fentanyl patch at q48 (reviewed outside pain notes, that is how they prescribed it intentionally)  -Continue cefepime and vancomycin  -TTE negative for endocarditis  -IR removed cm 1/23; post-removal clx negative so far   -Line holiday for 48 hours then can replace (would be 1/24 if cultures still negative)  -Repeat blood cultures after cm removal  -Infectious diseases is following, their help is appreciated  -temporary midline catheter placed as couldn't successfully place new PIV    Bacteremia: enterococcus faecalis and E coli in setting of History of recurrent CLABSI from indwelling Cm line  -Has had multiple episodes of Klebsiella bactermia requiring cm exchange, last replaced 11/2023  -PTA felt sick for a week, had fever to 103.2F. Went to [place] ED on 1/18, blood cultures positive   -Growing E. Coli and E faecalis, multiple cultures (patient refused some antibiotics early in course)   -Repeat cultures from 1/20 show no growth to date  -Empirically covered on cefepime and vancomycin  -TTE ordered, consider endocarditis  -Cm removed on 1/22, 48 hour line holiday can replace on 1/24 if cultures negative  -Infectious diseases is following, their help is appreciated  - temporary midline for access for antibiotics today    #Chronic TPN  #Short gut syndrome  #Severe malnutrition  #J tube (used for venting)  #Reflux   - PPN when able to start  (runs for 12 hours); hold lipids if insufficient IV access  - Normal diet per patient preference (discussed bariatric diet)  - Continue PTA pantoprazole   -Nutrition consult     Chronic pain  - PTA fentanyl patch  - PTA oxycodone      Anemia, chronic: Monitoring  Tobacco use disorder: Defers supportive aids  Anxiety: PTA lorazepam at bedtime  ADHD: PTA Adderall  Paroxysmal AFib: PTA carvedilol           Diet: Regular Diet Adult    DVT Prophylaxis: Enoxaparin (Lovenox) SQ  Sanchez Catheter: Not present  Lines: None     Cardiac Monitoring: None  Code Status: Full Code      Clinically Significant Risk Factors                             # Financial/Environmental Concerns: none         Disposition Plan     Expected Discharge Date: 01/25/2024      Destination: home with family;home with help/services  Discharge Comments: Discharge on 1/24 to 1/25 pending replacement of cm, resumption of TPN  Anticipate he will need home IV antibiotics (unable to place order until sure which antibiotic and length of treatment)            Juanjose Anderson MD  Hospitalist Service, [address]  Securely message with [employer] (more info)  Text page via [employer] Paging/Directory   See signed in provider for up to date coverage information  ______________________________________________________________________    Interval History   IV extravasated x 2; then further trouble with PIV. Refuses to have any other PIV. Can't have Cm replaced until 1/24    Physical Exam   Vital Signs: Temp: 98.3  F (36.8  C) Temp src: Oral BP: 115/89 Pulse: 86   Resp: 16 SpO2: 100 % O2 Device: None (Room air)    Weight: 0 lbs 0 oz  General appearance: in no apparent distress.  Neck: supple  CV: regular rate and rhythm and normal S1 S2  Resp: clear to ausculation bilaterally, normal respiratory effort  Abd: + BS, soft, NT/ND no masses   Extr: WWP  Skin: warm and dry      Medical Decision Making             Data  Yes

## 2024-01-24 LAB
ANION GAP SERPL CALCULATED.3IONS-SCNC: 9 MMOL/L (ref 7–15)
BACTERIA SPEC CULT: ABNORMAL
BUN SERPL-MCNC: 18.1 MG/DL (ref 6–20)
CALCIUM SERPL-MCNC: 8.4 MG/DL (ref 8.6–10)
CHLORIDE SERPL-SCNC: 106 MMOL/L (ref 98–107)
CREAT SERPL-MCNC: 0.77 MG/DL (ref 0.67–1.17)
DEPRECATED HCO3 PLAS-SCNC: 24 MMOL/L (ref 22–29)
EGFRCR SERPLBLD CKD-EPI 2021: >90 ML/MIN/1.73M2
ERYTHROCYTE [DISTWIDTH] IN BLOOD BY AUTOMATED COUNT: 18.6 % (ref 10–15)
GLUCOSE SERPL-MCNC: 79 MG/DL (ref 70–99)
GRAM STAIN RESULT: ABNORMAL
HCT VFR BLD AUTO: 28.3 % (ref 40–53)
HGB BLD-MCNC: 8.3 G/DL (ref 13.3–17.7)
MCH RBC QN AUTO: 23.7 PG (ref 26.5–33)
MCHC RBC AUTO-ENTMCNC: 29.3 G/DL (ref 31.5–36.5)
MCV RBC AUTO: 81 FL (ref 78–100)
PLATELET # BLD AUTO: 247 10E3/UL (ref 150–450)
POTASSIUM SERPL-SCNC: 4.2 MMOL/L (ref 3.4–5.3)
RBC # BLD AUTO: 3.5 10E6/UL (ref 4.4–5.9)
SODIUM SERPL-SCNC: 139 MMOL/L (ref 135–145)
WBC # BLD AUTO: 7.6 10E3/UL (ref 4–11)

## 2024-01-24 PROCEDURE — 250N000011 HC RX IP 250 OP 636: Performed by: STUDENT IN AN ORGANIZED HEALTH CARE EDUCATION/TRAINING PROGRAM

## 2024-01-24 PROCEDURE — 250N000013 HC RX MED GY IP 250 OP 250 PS 637: Performed by: INTERNAL MEDICINE

## 2024-01-24 PROCEDURE — 250N000013 HC RX MED GY IP 250 OP 250 PS 637: Performed by: STUDENT IN AN ORGANIZED HEALTH CARE EDUCATION/TRAINING PROGRAM

## 2024-01-24 PROCEDURE — 80048 BASIC METABOLIC PNL TOTAL CA: CPT | Performed by: STUDENT IN AN ORGANIZED HEALTH CARE EDUCATION/TRAINING PROGRAM

## 2024-01-24 PROCEDURE — 99232 SBSQ HOSP IP/OBS MODERATE 35: CPT | Performed by: INTERNAL MEDICINE

## 2024-01-24 PROCEDURE — 250N000011 HC RX IP 250 OP 636

## 2024-01-24 PROCEDURE — 250N000013 HC RX MED GY IP 250 OP 250 PS 637

## 2024-01-24 PROCEDURE — 250N000011 HC RX IP 250 OP 636: Performed by: EMERGENCY MEDICINE

## 2024-01-24 PROCEDURE — 250N000013 HC RX MED GY IP 250 OP 250 PS 637: Performed by: NURSE PRACTITIONER

## 2024-01-24 PROCEDURE — 99233 SBSQ HOSP IP/OBS HIGH 50: CPT | Mod: 24 | Performed by: INTERNAL MEDICINE

## 2024-01-24 PROCEDURE — 258N000003 HC RX IP 258 OP 636: Performed by: STUDENT IN AN ORGANIZED HEALTH CARE EDUCATION/TRAINING PROGRAM

## 2024-01-24 PROCEDURE — 120N000002 HC R&B MED SURG/OB UMMC

## 2024-01-24 PROCEDURE — 36415 COLL VENOUS BLD VENIPUNCTURE: CPT | Performed by: STUDENT IN AN ORGANIZED HEALTH CARE EDUCATION/TRAINING PROGRAM

## 2024-01-24 PROCEDURE — 85027 COMPLETE CBC AUTOMATED: CPT | Performed by: STUDENT IN AN ORGANIZED HEALTH CARE EDUCATION/TRAINING PROGRAM

## 2024-01-24 RX ORDER — CLINDAMYCIN PHOSPHATE 900 MG/50ML
900 INJECTION, SOLUTION INTRAVENOUS
Status: CANCELLED | OUTPATIENT
Start: 2024-01-24

## 2024-01-24 RX ORDER — CLINDAMYCIN PHOSPHATE 900 MG/50ML
900 INJECTION, SOLUTION INTRAVENOUS SEE ADMIN INSTRUCTIONS
Status: CANCELLED | OUTPATIENT
Start: 2024-01-24

## 2024-01-24 RX ADMIN — DIPHENHYDRAMINE HYDROCHLORIDE 25 MG: 50 INJECTION, SOLUTION INTRAMUSCULAR; INTRAVENOUS at 00:20

## 2024-01-24 RX ADMIN — CARVEDILOL 12.5 MG: 12.5 TABLET, FILM COATED ORAL at 18:23

## 2024-01-24 RX ADMIN — CEFEPIME 2 G: 2 INJECTION, POWDER, FOR SOLUTION INTRAVENOUS at 05:05

## 2024-01-24 RX ADMIN — ENOXAPARIN SODIUM 80 MG: 80 INJECTION SUBCUTANEOUS at 07:51

## 2024-01-24 RX ADMIN — VANCOMYCIN HYDROCHLORIDE 1750 MG: 10 INJECTION, POWDER, LYOPHILIZED, FOR SOLUTION INTRAVENOUS at 00:34

## 2024-01-24 RX ADMIN — DEXTROAMPHETAMINE SACCHARATE, AMPHETAMINE ASPARTATE, DEXTROAMPHETAMINE SULFATE AND AMPHETAMINE SULFATE 20 MG: 2.5; 2.5; 2.5; 2.5 TABLET ORAL at 07:51

## 2024-01-24 RX ADMIN — ACETAMINOPHEN 650 MG: 325 SOLUTION ORAL at 06:47

## 2024-01-24 RX ADMIN — ALPRAZOLAM 0.5 MG: 0.5 TABLET ORAL at 07:51

## 2024-01-24 RX ADMIN — ACETAMINOPHEN 650 MG: 325 SOLUTION ORAL at 15:20

## 2024-01-24 RX ADMIN — OXYCODONE HYDROCHLORIDE 15 MG: 5 SOLUTION ORAL at 11:25

## 2024-01-24 RX ADMIN — OXYCODONE HYDROCHLORIDE 15 MG: 5 SOLUTION ORAL at 02:20

## 2024-01-24 RX ADMIN — VANCOMYCIN HYDROCHLORIDE 1750 MG: 10 INJECTION, POWDER, LYOPHILIZED, FOR SOLUTION INTRAVENOUS at 12:10

## 2024-01-24 RX ADMIN — OXYCODONE HYDROCHLORIDE 15 MG: 5 SOLUTION ORAL at 22:16

## 2024-01-24 RX ADMIN — DIPHENHYDRAMINE HYDROCHLORIDE 25 MG: 50 INJECTION, SOLUTION INTRAMUSCULAR; INTRAVENOUS at 11:27

## 2024-01-24 RX ADMIN — ONDANSETRON 8 MG: 8 TABLET, ORALLY DISINTEGRATING ORAL at 15:20

## 2024-01-24 RX ADMIN — ALPRAZOLAM 0.5 MG: 0.5 TABLET ORAL at 22:17

## 2024-01-24 RX ADMIN — PREGABALIN 50 MG: 50 CAPSULE ORAL at 07:51

## 2024-01-24 RX ADMIN — PREGABALIN 50 MG: 50 CAPSULE ORAL at 19:57

## 2024-01-24 RX ADMIN — FENTANYL 1 PATCH: 25 PATCH TRANSDERMAL at 11:27

## 2024-01-24 RX ADMIN — ACETAMINOPHEN 650 MG: 325 SOLUTION ORAL at 11:25

## 2024-01-24 RX ADMIN — ACETAMINOPHEN 650 MG: 325 SOLUTION ORAL at 02:20

## 2024-01-24 RX ADMIN — ACETAMINOPHEN 650 MG: 325 SOLUTION ORAL at 22:16

## 2024-01-24 RX ADMIN — CEFEPIME 2 G: 2 INJECTION, POWDER, FOR SOLUTION INTRAVENOUS at 15:20

## 2024-01-24 RX ADMIN — CARVEDILOL 12.5 MG: 12.5 TABLET, FILM COATED ORAL at 07:51

## 2024-01-24 RX ADMIN — CEFEPIME 2 G: 2 INJECTION, POWDER, FOR SOLUTION INTRAVENOUS at 23:19

## 2024-01-24 RX ADMIN — ENOXAPARIN SODIUM 80 MG: 80 INJECTION SUBCUTANEOUS at 19:57

## 2024-01-24 RX ADMIN — OXYCODONE HYDROCHLORIDE 15 MG: 5 SOLUTION ORAL at 15:20

## 2024-01-24 RX ADMIN — OXYCODONE HYDROCHLORIDE 15 MG: 5 SOLUTION ORAL at 06:47

## 2024-01-24 ASSESSMENT — ACTIVITIES OF DAILY LIVING (ADL)
ADLS_ACUITY_SCORE: 24

## 2024-01-24 NOTE — PROGRESS NOTES
"Care Management Follow Up    Length of Stay (days): 5    Expected Discharge Date: 01/25/2024     Concerns to be Addressed: discharge planning     Patient plan of care discussed at interdisciplinary rounds: Yes    Anticipated Discharge Disposition: Home Infusion     Anticipated Discharge Services: None  Anticipated Discharge DME: None    Patient/family educated on Medicare website which has current facility and service quality ratings: no  Education Provided on the Discharge Plan: Yes  Patient/Family in Agreement with the Plan: yes    Referrals Placed by CM/SW: Internal Clinic Care Coordination, Home Infusion, Communication hand-offs to next level of Care Providers  Private pay costs discussed: Not applicable    Additional Information:    Pt will need to go home with IVAB Vanco. Writer sent referral to [facility] [place]. Here is the quote.     \"Pt Parker Acevedo has coverage for IV ABX through their [facility] Medicare Adv plan. Pt s IV ABX must be done via CADD pump for coverage (Vanco can be done via CADD so they should be covered). If drug changes please let us know so we can re-access coverage. Pt is covered automatically at 80% until their $493/$2900 out of pocket has been met. Once fulfilled pt will be covered at 100%.\"    Writer shared with the pt, he is pleased and has no concerns.    [facility] (Ph: [phone]; Email: [email or web address]; Fax: [phone])   PTA TPN & Lipids & IVAB (vanco)    Mally Willett, RN  7C RN Care Coordinator  Phone: [phone]  Pager: [phone]  [facility] ([id number]) Saturday & Sunday; ([id number]) [facility] Recognized Holidays   Units: 5A, 5B & 5C  Pager: [phone]  Units: 6B, 6C & 6D    Pager: [phone]  Units: 7A, 7B, 7C & 7D    Pager: [phone]  Units: 6A & ICU   Pager: [phone]  Units: 5 Ortho, 5MS & WB ED Pager: [phone]  Units: 6MS, 8A & 10 ICU  Pager [phone]          "

## 2024-01-24 NOTE — PROGRESS NOTES
General ID Green Service: Follow up Note     Patient:  Parker Acevedo, Date of birth 1970, Medical record number [id number]  Date of Visit:  January 21, 2024  Reason for consult: CLABSI         Assessment and Recommendations:     ID Problem list:  1. Polymicrobial bacteremia (E.coli, E.faecalis) - likely due to CLABSI  2. TPN dependent, chronic CVC  3. 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 H/o DVT  5. H/o penicillin allergy (tolerates cephalosporins)        Recs:  1. Continue empiric IV vancomycin (dosed by pharmacy) and cefepime while inpatient for polymicrobial bacteremia and given his history of penicillin allergy   i. Would suggest 10-day antibiotic course of from the time of blood culture clearance (tentatively 1/20-1/30) which also amounts to >7-day course from the time of CVC removal   ii. Could continue IV vancomycin for duration of above course (for E.faecalis), though could convert IV cefepime to PO levofloxacin suspension 750 q24hr upon discharge for remainder of course (for E.coli) to allow for less frequent IV antibiotic infusions as outpatient  iii. Given that <1 week remains of antibiotics, no further outpatient lab monitoring nor ID follow up required; will defer to his outpatient provider managing his chronic CVC and TPN an home care for ongoing monitoring and managment  2. Follow up pending blood cultures until finalized - prelim with no growth 1/20 onward  i. Agree reasonable to replace CVC tomorrow (in different site if able) assuming pending blood cultures remain negative  3. Remainder as per GI and primary (Dr. Isaac) who are managing his long term CVC and TPN and home care for remaining management  4. No ID follow up necessary given that he is on short fixed course of antibiotics        Discussion:   Patient with 1-2 weeks of fevers/chills and was found to have multiple bacteria (E.coli, E.faecalis) growing in blood cultures concerning for CVC infection with polymicrobial  "bacteremia. He has no signs of metastatic spread, and TTE without signs of vegetations.Given his history of recurrent CVC infections and his current polymicrobial bacteremia (and failure of prior line salvage attemp in similar situation) we opted to remove the CVC for line holiday to increase his chances of clearing his polymicrobial bacteremia. Now with blood culture clearance 1/20 onward. And now s/p line removal for line holiday and source control. Planned for line replacement likely tomorrow.         Thank you for allowing us to participate in the care of this patient. Green ID will sign off at this time. Can see [facility] schedule for paging details if questions.     50 minutes spent by me on the date of the encounter doing chart review, history and exam, documentation and further activities per the note     Koko Gustafson MD    Infectious Diseases   01/24/2024          Interval events:     Patient had left chest CVC removed on 1/22. Currently says feels better than when he came in. No fevers/chills, no pain at prior left chest CVC site. No new focal pain. Chronic GI symptoms unchanged. Says he's planned to get his CVC replaced tomorrow.         Initail History of Present Illness:     As per initial ID consult note this admission from 1/21/24:    \"53M with PMH including Celestino-en-y gastric bypass c/b short gut syndrome, now TPN-dependent, h/o DVT, and history of recurrent CLABSI (last 11/2023), who went to [facility] ED on 1/18 and was later admitted 1/19 at [facility] due to fevers/chills and found to have polymicrobial bacteremia.     Per patient, he had been having a few weeks of fevers/chills every time he uses his CVC for TPN infusion (infuses ~12 hrs/day) and then he says the fevers/chills resolve when he stops using the CVC. He says the fevers were associated with sweats, myalgias, malaise - all resolve after he stops using the line, none of which he is experiencing right now. He says this is similar to prior " "episodes of CLABSI. No pain, swelling, erythema, nor discharge from the CVC insertion site, though he does note that the CVC dressing fell off a few days ago while he was having sweats and has not been replaced. He says the CVC was last placed 2-3 months ago, and that he always has to get it removed when he has CLABSI (that prior attempts to salvage his lines have always been unsuccessful).     No arm swelling, no vision changes, no current back pain, no current joint pain, no vision changes. Chronic GI symptoms unchanged. No respiratory symptoms. No urinary symptoms.\"         Review of Systems:   ROS obtained, pertinent positives and negatives as above.       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     Complication of anesthesia      Difficulty swallowing      Gastric ulcer, unspecified as acute or chronic, without mention of hemorrhage, perforation, or obstruction      Gastro-oesophageal reflux disease      Head injury      Hiatal hernia      Other bladder disorder      Other chronic pain      PONV (postoperative nausea and vomiting)      Severe malnutrition (H24)     TPN     Short gut syndrome      Tobacco abuse      Past Surgical History:   Procedure Laterality Date     AMPUTATION       APPENDECTOMY       BACK SURGERY  11/3/2014    curve in the spine     BIOPSY LYMPH NODE CERVICAL N/A 2/20/2015    Procedure: BIOPSY LYMPH NODE CERVICAL;  Surgeon: Baron Scanlon MD;  [facility]     CHOLECYSTECTOMY       COLONOSCOPY N/A 7/14/2021    Procedure: COLONOSCOPY;  Surgeon: Jimbo Estrada MD;  Location: [facility] OR     COLONOSCOPY N/A 4/13/2022    Procedure: COLONOSCOPY;  Surgeon: Jimbo Estrada MD;  " Location: [facility] OR     COLONOSCOPY N/A 9/19/2023    Procedure: Colonoscopy;  Surgeon: Jimbo Estrada MD;  Location: [facility] OR     DISCECTOMY, FUSION CERVICAL ANTERIOR ONE LEVEL, COMBINED N/A 2/15/2017    Procedure: COMBINED DISCECTOMY, FUSION CERVICAL ANTERIOR ONE LEVEL;  Surgeon: Darren Campos MD;  [facility]     ENDOSCOPIC INSERTION TUBE GASTROSTOMY  9/9/2013    Procedure: ENDOSCOPIC INSERTION TUBE GASTROSTOMY;;  Surgeon: Francis Vyas MD;  [facility]     ENDOSCOPIC ULTRASOUND UPPER GASTROINTESTINAL TRACT (GI)  4/29/2011    Procedure:ENDOSCOPIC ULTRASOUND UPPER GASTROINTESTINAL TRACT (GI); Both Procedures done Conjointly; Surgeon:NEREIDA HOUSER; [facility]     ENDOSCOPIC ULTRASOUND UPPER GASTROINTESTINAL TRACT (GI)  9/9/2013    Procedure: ENDOSCOPIC ULTRASOUND UPPER GASTROINTESTINAL TRACT (GI);  Endoscopic Ultrasound Guide Gastrostomy Tube Placement  C-arm;  Surgeon: Noe Lizarraga MD;  [facility]     ENDOSCOPIC ULTRASOUND UPPER GASTROINTESTINAL TRACT (GI) N/A 2/24/2021    Procedure: ENDOSCOPIC ULTRASOUND, ESOPHAGOSCOPY / UPPER GASTROINTESTINAL TRACT (GI), esophagastrogastroduodenoscopy;  Surgeon: Berny Bach MD;  [facility]     ENDOSCOPY  03/25/11    EGD, [facility]     ENDOSCOPY  08/04/09    Upper Endoscopy, [facility]     ENDOSCOPY  01/05/09    Upper Endoscopy, [facility]     ESOPHAGOSCOPY, GASTROSCOPY, DUODENOSCOPY (EGD), COMBINED  4/20/2011    Procedure:COMBINED ESOPHAGOSCOPY, GASTROSCOPY, DUODENOSCOPY (EGD); Surgeon:FRANCIS VYAS; [facility]     ESOPHAGOSCOPY, GASTROSCOPY, DUODENOSCOPY (EGD), COMBINED  6/15/2011    Procedure:COMBINED ESOPHAGOSCOPY, GASTROSCOPY, DUODENOSCOPY (EGD); Surgeon:FRANCIS VYAS; [facility]     ESOPHAGOSCOPY, GASTROSCOPY, DUODENOSCOPY (EGD), COMBINED  6/12/2013    Procedure: COMBINED ESOPHAGOSCOPY, GASTROSCOPY, DUODENOSCOPY (EGD);;  Surgeon: Francis Vyas MD;   [facility]     ESOPHAGOSCOPY, GASTROSCOPY, DUODENOSCOPY (EGD), COMBINED  11/22/2013    Procedure: COMBINED ESOPHAGOSCOPY, GASTROSCOPY, DUODENOSCOPY (EGD);;  Surgeon: Francis Vyas MD;  [facility]     ESOPHAGOSCOPY, GASTROSCOPY, DUODENOSCOPY (EGD), COMBINED  4/30/2014    Procedure: COMBINED ESOPHAGOSCOPY, GASTROSCOPY, DUODENOSCOPY (EGD);  Surgeon: Francis Vyas MD;  Location:  GI     ESOPHAGOSCOPY, GASTROSCOPY, DUODENOSCOPY (EGD), COMBINED N/A 2/20/2015    Procedure: COMBINED ESOPHAGOSCOPY, GASTROSCOPY, DUODENOSCOPY (EGD), BIOPSY SINGLE OR MULTIPLE;  Surgeon: Baron Scanlon MD;  Location:  OR     ESOPHAGOSCOPY, GASTROSCOPY, DUODENOSCOPY (EGD), COMBINED N/A 9/30/2015    Procedure: COMBINED ESOPHAGOSCOPY, GASTROSCOPY, DUODENOSCOPY (EGD);  Surgeon: Francis Vyas MD;  Location:  GI     ESOPHAGOSCOPY, GASTROSCOPY, DUODENOSCOPY (EGD), COMBINED N/A 10/3/2019    Procedure: Upper Endoscopy;  Surgeon: Clif Morrow MD;  [facility]     GASTRECTOMY  6/22/2012    Procedure: GASTRECTOMY;  Open Approach, Excise Ulcers,Partial Gastrectomy, Esophagojejunostomy, Hiatal Hernia Repair, Extensive Lysis of Adhesions and Esaphagogastrodudenoscopy.;  Surgeon: Francis Vyas MD;  [facility]     GASTROJEJUNOSTOMY  08/26/09    Extensice enterolysis, partial resect. jejunum, part. resect gastric pouch, gastrojejunostomy anastomosis     HC ESOPH/GAS REFLUX TEST W NASAL IMPED ELECTRODE  8/5/2013    Procedure: ESOPHAGEAL IMPEDENCE FUNCTION TEST 1 HOUR OR LESS;  Surgeon: Halie Lang MD;  Location:  GI     HEAD & NECK SURGERY  2/15/2017    C5-C6     HERNIA REPAIR  2006    Umbilical hernia     HERNIORRHAPHY HIATAL  6/22/2012    Procedure: HERNIORRHAPHY HIATAL;;  Surgeon: Francis Vyas MD;  Location:  OR     HERNIORRHAPHY INGUINAL  11/22/2013    Procedure: HERNIORRHAPHY INGUINAL;;  Surgeon: Francis Vyas MD;  [facility]     INSERT PICC LINE Right  12/19/2019    Procedure: Picc Placement;  Surgeon: Per Dumont PA-C;  [facility]     INSERT PICC LINE Right 2/21/2020    Procedure: INSERTION, PICC;  Surgeon: Per Dumont PA-C;  [facility]     INSERT PORT VASCULAR ACCESS Right 12/19/2017    Procedure: INSERT PORT VASCULAR ACCESS;  Right Chest Port Placement ;  Surgeon: Lisandro Alejandro PA-C;  [facility]     INSERT PORT VASCULAR ACCESS Right 8/2/2018    Procedure: INSERT PORT VASCULAR ACCESS;  Place single lumen tunneled central venous access catheter;  Surgeon: Guy Jamil PA-C;  [facility]     IR CVC TUNNEL PLACEMENT > 5 YRS OF AGE  8/7/2019     IR CVC TUNNEL PLACEMENT > 5 YRS OF AGE  4/14/2020     IR CVC TUNNEL PLACEMENT > 5 YRS OF AGE  8/3/2020     IR CVC TUNNEL PLACEMENT > 5 YRS OF AGE  9/4/2020     IR CVC TUNNEL PLACEMENT > 5 YRS OF AGE  2/5/2021     IR CVC TUNNEL PLACEMENT > 5 YRS OF AGE  3/23/2021     IR CVC TUNNEL PLACEMENT > 5 YRS OF AGE  1/13/2022     IR CVC TUNNEL PLACEMENT > 5 YRS OF AGE  5/12/2022     IR CVC TUNNEL PLACEMENT > 5 YRS OF AGE  7/13/2022     IR CVC TUNNEL PLACEMENT > 5 YRS OF AGE  7/10/2023     IR CVC TUNNEL PLACEMENT > 5 YRS OF AGE  11/17/2023     IR CVC TUNNEL REMOVAL LEFT  6/7/2023     IR CVC TUNNEL REMOVAL LEFT  11/14/2023     IR CVC TUNNEL REMOVAL LEFT  1/22/2024     IR CVC TUNNEL REMOVAL RIGHT  10/1/2019     IR CVC TUNNEL REMOVAL RIGHT  7/30/2020     IR CVC TUNNEL REMOVAL RIGHT  9/2/2020     IR CVC TUNNEL REMOVAL RIGHT  2/3/2021     IR CVC TUNNEL REMOVAL RIGHT  3/19/2021     IR CVC TUNNEL REMOVAL RIGHT  1/10/2022     IR CVC TUNNEL REMOVAL RIGHT  5/9/2022     IR CVC TUNNEL REMOVAL RIGHT  7/8/2022     IR CVC TUNNEL REVISION LEFT  8/10/2022     IR CVC TUNNEL REVISION LEFT  9/19/2023     IR CVC TUNNEL REVISION RIGHT  5/7/2021     IR FOLLOW UP VISIT OUTPATIENT  8/7/2019     IR PICC EXCHANGE LEFT  6/8/2023     IR PICC PLACEMENT > 5 YRS OF AGE  3/7/2019     IR PICC PLACEMENT > 5 YRS OF  AGE  12/19/2019     IR PICC PLACEMENT > 5 YRS OF AGE  2/21/2020     IR PICC PLACEMENT > 5 YRS OF AGE  6/7/2023     LAPAROTOMY EXPLORATORY  11/22/2013    Procedure: LAPAROTOMY EXPLORATORY;  Exploratory Laparotomy, Upper Endoscopy, Left Inguinal Hernia Repair;  Surgeon: Francis Vyas MD;  [facility]     LAPAROTOMY EXPLORATORY N/A 9/30/2023    Procedure: Laparotomy exploratory, reduction of intussusception, resection of small bowel with primary anastamosis, upper endoscopy;  Surgeon: Delvis Mercado MD;  [facility]     ORTHOPEDIC SURGERY       PICC INSERTION Right 03/16/2017    5fr DL BioFlo PICC, 42cm (3cm external) in the R medial brachial vein w/ tip in the SVC RA junction.     PICC INSERTION Left 09/23/2017    5fr DL BioFlo PICC, 45cm (1cm external) in the L basilic vein w/ tip in the SVC RA junction.     PICC INSERTION Right 05/16/2019    5Fr - 43cm, Medial brachial vein, low SVC     PICC INSERTION Right 10/02/2019    5Fr - 43cm (2cm external), basilic vein, low SVC     SHAYLEE EN Y BOWEL  2003     SOFT TISSUE SURGERY       THORACIC SURGERY       TONSILLECTOMY       TRANSESOPHAGEAL ECHOCARDIOGRAM INTRAOPERATIVE N/A 1/8/2019    Procedure: TRANSESOPHAGEAL ECHOCARDIOGRAM INTRAOPERATIVE;  Surgeon: GENERIC ANESTHESIA PROVIDER;  [facility]     TRANSESOPHAGEAL ECHOCARDIOGRAM INTRAOPERATIVE N/A 7/7/2023    Procedure: Transesophageal echocardiogram in the OR;  Surgeon: GENERIC ANESTHESIA PROVIDER;  [facility]     TRANSESOPHAGEAL ECHOCARDIOGRAM INTRAOPERATIVE N/A 11/17/2023    Procedure: ECHOCARDIOGRAM,TRANSESOPHAGEAL,WITH ANESTHESIA;  Surgeon: GENERIC ANESTHESIA PROVIDER;  [facility]     ZZC GASTRIC BYPASS,OBESE<100CM SHAYLEE-EN-Y  2002    lost 300 pounds     Otherwise as per HPI      Allergies:      Allergies   Allergen Reactions     Bactrim [Sulfamethoxazole-Trimethoprim] Rash     Penicillins Anaphylaxis     Please see Antimicrobial Management Team allergy assessment note 10/10/2018.  Patient reported tolerating amoxicillin.  Tolerating cefepime and ceftriaxone without reaction 6/23     Ertapenem Nausea and Vomiting     Doxycycline Rash     Vancomycin Rash     Rash after receiving vancomycin 3/28/16 (infusion reaction?). Tolerated with slower infusion and diphenhydramine premed.  Tolerated 1250mg over 90minutes 7/2023.            Current Antimicrobials:   IV vancomycin  IV cefepime       Family History:   Reviewed and noncontributory       Social History:     Social History     Tobacco Use     Smoking status: Light Smoker     Packs/day: 0.50     Years: 3.00     Additional pack years: 0.00     Total pack years: 1.50     Types: Cigarettes     Smokeless tobacco: Never     Tobacco comments:     2/4/2021    smokes 3 cigarettes/day   Vaping Use     Vaping Use: Never used   Substance Use Topics     Alcohol use: No     Comment: quit 2002     Drug use: No     Otherwise as per HPI         Physical Exam:   Ranges forvital signs:  Temp:  [98.6  F (37  C)-99.1  F (37.3  C)] 98.7  F (37.1  C)  Pulse:  [80-83] 80  Resp:  [16-20] 17  BP: (106-133)/(62-79) 106/62  SpO2:  [100 %] 100 %  GENERAL:  Adult male, standing in room in no acute distress.   ENT:  Head is normocephalic, atraumatic. Oropharynx is moist appearing.  LUNGS:  Unlabored breathing on room air.  ABDOMEN:  Non-distended appearing.  MSK/SKIN: No acute rashes visible on exposed skin.  Prior left chest CVC site (now removed) with scab, without any tenderness.  NEUROLOGIC:  Awake, alert, interactive.         Laboratory Data:     Inflammatory Markers    Recent Labs   Lab Test 11/11/23  0033 11/02/23  1310 08/07/23  0817 07/18/23  1230 07/04/23  1556 05/09/23  1230 07/16/22  2216 07/07/22  1231 02/23/22  1621 07/28/20  1607 05/14/19  1145 01/23/18  0845 01/20/18  1030 09/24/17  1900   SED  --   --   --   --  17  --  18*  --  10 10 13 10 11 31*   CRPI 11.39* <3.00 15.21* <3.00 89.60* <3.00  --  47.70*  --   --   --   --   --   --        Hematology Studies     Recent Labs   Lab Test 01/24/24  0549 01/23/24  0626 01/22/24  0635 01/21/24  0550 01/19/24 2113 01/02/24  0120 07/13/21  1110 06/29/21  1016 06/14/21  1017 06/09/21  1044 06/01/21  1117 05/25/21  1147 05/19/21  1342 05/18/21  1015   WBC 7.6 8.5 8.9 7.6 11.3* 6.6   < > 6.9 8.1 7.5 7.3   < > 6.1 6.2   ANEU  --   --   --   --   --   --   --  3.1 4.1 4.7 4.3  --  3.5 3.7   AEOS  --   --   --   --   --   --   --  0.2 0.2 0.3 0.1  --  0.2 0.2   HGB 8.3* 9.6* 8.7* 8.2* 9.9* 8.8*   < > 12.1* 12.0* 13.3 12.1*   < > 12.5* 12.6*   MCV 81 80 81 81 79 79   < > 96 97 96 94   < > 97 98    277 253 250 313 327   < > 178 158 169 174   < > 159 145*    < > = values in this interval not displayed.       Metabolic Studies     Recent Labs   Lab Test 01/24/24  0549 01/23/24  0626 01/22/24 0635 01/21/24  0550 01/19/24 2113    140 138 141 142   POTASSIUM 4.2 3.7 4.3 4.5 4.3   CHLORIDE 106 102 106 108* 108*   CO2 24 26 23 26 26   BUN 18.1 10.7 11.0 10.9 12.0   CR 0.77 0.75 0.79 0.77 0.85   GFRESTIMATED >90 >90 >90 >90 >90       Hepatic Studies    Recent Labs   Lab Test 01/19/24 2113 01/02/24  0120 11/30/23  1522 11/24/23  1451 11/15/23  1249 11/14/23  0424   BILITOTAL 0.2 0.3  0.3 0.2 0.2 0.2 0.2   ALKPHOS 78 102 66 65 64 61   ALBUMIN 3.9 3.8 3.7 3.5 3.9 3.7   AST 17 15 17 19 14 11   ALT 10 24 16 10 10 7       Microbiology:    Culture   Date Value Ref Range Status   01/20/2024 No growth after 1 day  Preliminary   01/20/2024 No growth after 1 day  Preliminary   01/19/2024 Positive on the 1st day of incubation (A)  Preliminary   01/19/2024 Enterococcus faecalis (AA)  Preliminary     Comment:     1 of 2 bottles  Susceptibilities done on previous cultures   01/19/2024 Positive on the 1st day of incubation (A)  Preliminary   01/19/2024 Escherichia coli (AA)  Preliminary     Comment:     2 of 2 bottles  Susceptibilities done on previous cultures   01/19/2024 Enterococcus faecalis (AA)  Preliminary     Comment:     2 of 2  bottles  Susceptibilities done on previous cultures   2024 Positive on the 1st day of incubation (A)  Preliminary   2024 Escherichia coli (AA)  Preliminary     Comment:     2 of 2 bottles  Susceptibilities done on previous cultures   2024 Enterococcus faecalis (AA)  Preliminary     Comment:     2 of 2 bottles  Susceptibilities done on previous cultures   2024 Positive on the 1st day of incubation (A)  Final   2024 Escherichia coli (AA)  Final     Comment:     2 of 2 bottles  Susceptibilities done on previous cultures   2024 Enterococcus faecalis (AA)  Final     Comment:     2 of 2 bottles  Susceptibilities done on previous cultures   2024 Positive on the 1st day of incubation (A)  Final   2024 Escherichia coli (AA)  Final     Comment:     2 of 2 bottles   2024 Enterococcus faecalis (AA)  Final     Comment:     2 of 2 bottles   2024 No growth after 2 days  Preliminary       Urine Studies    Recent Labs   Lab Test 24  0048 23  0042 23  1056 23  0820 23  0916   LEUKEST Negative Negative Negative Negative Negative   WBCU 1 1 1 4 0       Vancomycin Levels    Recent Labs   Lab Test 24  0626 24  0952 23  0818   VANCOMYCIN 16.0 10.1 8.9            Imagin/20/24 CXR report:  IMPRESSION: Negative chest. Left subclavian catheter terminates at the superior cavoatrial junction.    24 TTE report: No vegetations noted.

## 2024-01-24 NOTE — PROGRESS NOTES
[facility]    Medicine Progress Note - Hospitalist Service, GOLD TEAM 8    Date of Admission:  1/19/2024    Assessment & Plan   Parker Acevedo is a 53 year old male with a past medical history of Celestino-En-Y (2002) complicated by repeated ab surgeries resulting in short gut syndrome, severe malnutrition on chronic TPN via Cm, dysphagia, anxiety, recurrent CLABSI, and LUE non-purulent cellulitis and catheter-associated septic subclavian DVT (on Lovenox) who presented for recurrent bacteremia.    Today  -fentanyl patch at q48 (reviewed outside pain notes, that is how they prescribed it intentionally)  -Continue cefepime and vancomycin  -TTE negative for endocarditis  -IR removed cm 1/23; post-removal clx negative so far   -Line holiday for 48 hours then can replace (would be today if cultures still negative but IR can't do until 1/25)  -Infectious diseases is following, their help is appreciated for duration of antibiotic course  -temporary midline catheter placed 1/23 as couldn't successfully place new PIV    Bacteremia: enterococcus faecalis and E coli in setting of History of recurrent CLABSI from indwelling Cm line  -Has had multiple episodes of Klebsiella bactermia requiring Cm exchange, last replaced 11/2023  -PTA felt sick for a week, had fever to 103.2F. Went to [place] ED on 1/18, blood cultures positive   -Growing E. Coli and E faecalis, multiple cultures (patient refused some antibiotics early in course)   -Repeat cultures from 1/20 show no growth to date  -Empirically covered on cefepime and vancomycin  -TTE ordered, consider endocarditis  -Cm removed on 1/22, 48 hour line holiday can replace on 1/24 if cultures negative  -Infectious diseases is following, their help is appreciated  - temporary midline for access for antibiotics today    Chronic TPN due to Short gut syndrome  #Severe malnutrition  #J tube (used for venting)  #Reflux    - PPN when able to start (runs for 12 hours); hold lipids if insufficient IV access  - Normal diet per patient preference (discussed bariatric diet)  - Continue PTA pantoprazole   -Nutrition consult     Chronic pain  - PTA fentanyl patch  - PTA oxycodone      Anemia, chronic: Monitoring  Tobacco use disorder: Defers supportive aids  Anxiety: PTA lorazepam at bedtime  ADHD: PTA Adderall  Paroxysmal AFib: PTA carvedilol           Diet: Regular Diet Adult  NPO per Anesthesia Guidelines for Procedure/Surgery Except for: Meds, Ice Chips    DVT Prophylaxis: Enoxaparin (Lovenox) SQ  Sanchez Catheter: Not present  Lines: None     Cardiac Monitoring: None  Code Status: Full Code      Clinically Significant Risk Factors                             # Financial/Environmental Concerns: none         Disposition Plan      Expected Discharge Date: 01/25/2024      Destination: home with family;home with help/services  Discharge Comments: Discharge on 1/24 to 1/25 pending replacement of azevedo, resumption of TPN  Anticipate he will need home IV antibiotics (unable to place order until sure which antibiotic and length of treatment)       Working with ID and RN CC on home plan      Juanjose Anderson MD  Hospitalist Service, GOLD TEAM [address] [place] [address]  Securely message with [employer] (more info)  Text page via [employer] Paging/Directory   See signed in provider for up to date coverage information  ______________________________________________________________________    Interval History   No overnight events    Physical Exam   Vital Signs: Temp: 98.7  F (37.1  C) Temp src: Oral BP: 106/62 Pulse: 80   Resp: 17 SpO2: 100 % O2 Device: None (Room air)    Weight: 0 lbs 0 oz  General appearance: in no apparent distress.  Neck: supple  CV: regular rate and rhythm and normal S1 S2, no murmur  Resp: clear to ausculation bilaterally, normal respiratory effort  Abd: + BS, soft, NT/ND no masses   Extr: WWP, no  edema  Skin: warm and dry      Medical Decision Making             Data

## 2024-01-24 NOTE — PLAN OF CARE
Goal Outcome Evaluation:      Plan of Care Reviewed With: patient    Overall Patient Progress: improvingOverall Patient Progress: improving         Pt admitted for bacteremia. Pt A&Ox4, up ad vitor. Pt complained of abdominal pain and received PRN Tylenol and Oxycodone. Pt also had a Fentanyl patch on. Pt currently on a central line holiday and has a extended dwelling IV in the left arm. IV antibiotics given. Pt had two IVs infiltrate on bilat arms on previous shifts, outline was marked. Pt had a clamped g-tube that the patient used for venting. IR was consulted for a new tunneled line. Pt able to make his needs known. Continue with plan of care.

## 2024-01-24 NOTE — CONSULTS
[facility] Consult Service Note  Interventional Radiology  01/24/24   9:02 AM    Consult Requested: TCVC placement for TPN    Recommendations/Plan:    Patient is approved for image guided double lumen TCVC placement on 1/25/24.      Timing of procedure is TBD based on IR staffing/schedule and triage.  Please contact the IR charge RN at [phone] for estimated time of procedure.     Labs WNL for procedure.    Orders entered for procedure, NPO status, and pre procedure IV antibiotics. Medications to be held include: none  Informed consent will be completed prior to procedure.     Case and imaging discussed with IR attending Dr. Yaya Thomas MD   Recommendations were reviewed with Juanjose Anderson MD.     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     Expected date of discharge:  01/25/2024    Vitals:   /62 (BP Location: Right arm)   Pulse 80   Temp 98.7  F (37.1  C) (Oral)   Resp 17   SpO2 100%     Pertinent Labs Reviewed:  CBC:  Lab Results   Component Value Date    WBC 7.6 01/24/2024    WBC 8.5 01/23/2024    WBC 8.9 01/22/2024    WBC 6.9 06/29/2021    WBC 8.1 06/14/2021    WBC 7.5 06/09/2021     Lab Results   Component Value Date    HGB 8.3 01/24/2024    HGB 9.6 01/23/2024    HGB 8.7 01/22/2024    HGB 12.1 06/29/2021    HGB 12.0 06/14/2021    HGB 13.3 06/09/2021     Lab Results   Component Value Date     01/24/2024     01/23/2024     01/22/2024     06/29/2021     06/14/2021     06/09/2021    INR:  Lab Results   Component Value Date    INR 1.10 01/22/2024    INR 1.07  05/07/2021    PTT 25 09/29/2023    PTT 26 07/22/2019          COVID Results:  COVID-19 Antibody Results, Testing for Immunity           No data to display              COVID-19 PCR Results          2/24/2022    01:49 4/9/2022    10:58 5/7/2022    14:52 7/7/2022    13:19 7/16/2022    22:19 8/9/2022    18:06 8/4/2023    04:27 11/11/2023    01:30   COVID-19 PCR Results   SARS CoV2 PCR Negative  Negative  Negative  Negative  Negative  Negative  Negative  Negative     Potassium   Date Value Ref Range Status   01/24/2024 4.2 3.4 - 5.3 mmol/L Final   12/13/2022 3.8 3.4 - 5.3 mmol/L Final   06/29/2021 3.5 3.4 - 5.3 mmol/L Final          Per Dumont PA-C  Interventional Radiology  Pager: [phone]

## 2024-01-24 NOTE — PLAN OF CARE
Goal Outcome Evaluation:      Plan of Care Reviewed With: patient    Overall Patient Progress: no changeOverall Patient Progress: no change    Outcome Evaluation: 2813-5629: Pt VSS on RA. Pt having difficulty tolerating vanco doses. No BM on shift voiding adequately. Pain being managed by current regimen. Will continue with plan of care.

## 2024-01-24 NOTE — PLAN OF CARE
Goal Outcome Evaluation:      Plan of Care Reviewed With: patient    Overall Patient Progress: improvingOverall Patient Progress: improving    Outcome Evaluation: Will go home with home HASEEB Willett RNCC

## 2024-01-25 ENCOUNTER — APPOINTMENT (OUTPATIENT)
Dept: INTERVENTIONAL RADIOLOGY/VASCULAR | Facility: CLINIC | Age: 54
DRG: 315 | End: 2024-01-25
Attending: PHYSICIAN ASSISTANT
Payer: COMMERCIAL

## 2024-01-25 VITALS
HEART RATE: 66 BPM | RESPIRATION RATE: 20 BRPM | OXYGEN SATURATION: 99 % | SYSTOLIC BLOOD PRESSURE: 138 MMHG | DIASTOLIC BLOOD PRESSURE: 99 MMHG | TEMPERATURE: 98.5 F

## 2024-01-25 DIAGNOSIS — Z53.9 DIAGNOSIS NOT YET DEFINED: Primary | ICD-10-CM

## 2024-01-25 PROBLEM — R78.81 BACTEREMIA: Status: RESOLVED | Noted: 2024-01-19 | Resolved: 2024-01-25

## 2024-01-25 LAB
ANION GAP SERPL CALCULATED.3IONS-SCNC: 11 MMOL/L (ref 7–15)
BACTERIA BLD CULT: NO GROWTH
BACTERIA BLD CULT: NO GROWTH
BUN SERPL-MCNC: 16.4 MG/DL (ref 6–20)
CALCIUM SERPL-MCNC: 8.6 MG/DL (ref 8.6–10)
CHLORIDE SERPL-SCNC: 108 MMOL/L (ref 98–107)
CREAT SERPL-MCNC: 0.76 MG/DL (ref 0.67–1.17)
DEPRECATED HCO3 PLAS-SCNC: 22 MMOL/L (ref 22–29)
EGFRCR SERPLBLD CKD-EPI 2021: >90 ML/MIN/1.73M2
ERYTHROCYTE [DISTWIDTH] IN BLOOD BY AUTOMATED COUNT: 18.6 % (ref 10–15)
GLUCOSE SERPL-MCNC: 91 MG/DL (ref 70–99)
HCT VFR BLD AUTO: 29.7 % (ref 40–53)
HGB BLD-MCNC: 8.7 G/DL (ref 13.3–17.7)
MCH RBC QN AUTO: 23 PG (ref 26.5–33)
MCHC RBC AUTO-ENTMCNC: 29.3 G/DL (ref 31.5–36.5)
MCV RBC AUTO: 79 FL (ref 78–100)
PLATELET # BLD AUTO: 279 10E3/UL (ref 150–450)
POTASSIUM SERPL-SCNC: 4.4 MMOL/L (ref 3.4–5.3)
RBC # BLD AUTO: 3.78 10E6/UL (ref 4.4–5.9)
SODIUM SERPL-SCNC: 141 MMOL/L (ref 135–145)
VANCOMYCIN SERPL-MCNC: 21.1 UG/ML
WBC # BLD AUTO: 7.7 10E3/UL (ref 4–11)

## 2024-01-25 PROCEDURE — C1769 GUIDE WIRE: HCPCS

## 2024-01-25 PROCEDURE — 250N000013 HC RX MED GY IP 250 OP 250 PS 637: Performed by: STUDENT IN AN ORGANIZED HEALTH CARE EDUCATION/TRAINING PROGRAM

## 2024-01-25 PROCEDURE — 85027 COMPLETE CBC AUTOMATED: CPT | Performed by: STUDENT IN AN ORGANIZED HEALTH CARE EDUCATION/TRAINING PROGRAM

## 2024-01-25 PROCEDURE — 250N000013 HC RX MED GY IP 250 OP 250 PS 637

## 2024-01-25 PROCEDURE — 0JH63XZ INSERTION OF TUNNELED VASCULAR ACCESS DEVICE INTO CHEST SUBCUTANEOUS TISSUE AND FASCIA, PERCUTANEOUS APPROACH: ICD-10-PCS

## 2024-01-25 PROCEDURE — 258N000003 HC RX IP 258 OP 636: Performed by: STUDENT IN AN ORGANIZED HEALTH CARE EDUCATION/TRAINING PROGRAM

## 2024-01-25 PROCEDURE — 99239 HOSP IP/OBS DSCHRG MGMT >30: CPT | Performed by: STUDENT IN AN ORGANIZED HEALTH CARE EDUCATION/TRAINING PROGRAM

## 2024-01-25 PROCEDURE — 80048 BASIC METABOLIC PNL TOTAL CA: CPT | Performed by: STUDENT IN AN ORGANIZED HEALTH CARE EDUCATION/TRAINING PROGRAM

## 2024-01-25 PROCEDURE — 272N000504 HC NEEDLE CR4

## 2024-01-25 PROCEDURE — 99152 MOD SED SAME PHYS/QHP 5/>YRS: CPT

## 2024-01-25 PROCEDURE — 250N000013 HC RX MED GY IP 250 OP 250 PS 637: Performed by: INTERNAL MEDICINE

## 2024-01-25 PROCEDURE — 250N000011 HC RX IP 250 OP 636: Performed by: STUDENT IN AN ORGANIZED HEALTH CARE EDUCATION/TRAINING PROGRAM

## 2024-01-25 PROCEDURE — 76937 US GUIDE VASCULAR ACCESS: CPT | Mod: 26

## 2024-01-25 PROCEDURE — 250N000013 HC RX MED GY IP 250 OP 250 PS 637: Performed by: NURSE PRACTITIONER

## 2024-01-25 PROCEDURE — 02H633Z INSERTION OF INFUSION DEVICE INTO RIGHT ATRIUM, PERCUTANEOUS APPROACH: ICD-10-PCS

## 2024-01-25 PROCEDURE — 250N000011 HC RX IP 250 OP 636: Performed by: EMERGENCY MEDICINE

## 2024-01-25 PROCEDURE — 36558 INSERT TUNNELED CV CATH: CPT | Mod: 78

## 2024-01-25 PROCEDURE — 272N000192 HC ACCESSORY CR2

## 2024-01-25 PROCEDURE — 250N000011 HC RX IP 250 OP 636

## 2024-01-25 PROCEDURE — C1751 CATH, INF, PER/CENT/MIDLINE: HCPCS

## 2024-01-25 PROCEDURE — 250N000009 HC RX 250

## 2024-01-25 PROCEDURE — 80202 ASSAY OF VANCOMYCIN: CPT | Performed by: INTERNAL MEDICINE

## 2024-01-25 PROCEDURE — 77001 FLUOROGUIDE FOR VEIN DEVICE: CPT | Mod: 26

## 2024-01-25 PROCEDURE — 36415 COLL VENOUS BLD VENIPUNCTURE: CPT | Performed by: STUDENT IN AN ORGANIZED HEALTH CARE EDUCATION/TRAINING PROGRAM

## 2024-01-25 RX ORDER — HEPARIN SODIUM,PORCINE 10 UNIT/ML
5 VIAL (ML) INTRAVENOUS
Status: COMPLETED | OUTPATIENT
Start: 2024-01-25 | End: 2024-01-25

## 2024-01-25 RX ORDER — AMOXICILLIN 250 MG
2 CAPSULE ORAL 2 TIMES DAILY PRN
Qty: 30 TABLET | Refills: 0 | Status: SHIPPED | OUTPATIENT
Start: 2024-01-25 | End: 2024-06-15

## 2024-01-25 RX ORDER — FENTANYL CITRATE 50 UG/ML
25-50 INJECTION, SOLUTION INTRAMUSCULAR; INTRAVENOUS EVERY 5 MIN PRN
Status: DISCONTINUED | OUTPATIENT
Start: 2024-01-25 | End: 2024-01-25

## 2024-01-25 RX ORDER — NALOXONE HYDROCHLORIDE 0.4 MG/ML
0.4 INJECTION, SOLUTION INTRAMUSCULAR; INTRAVENOUS; SUBCUTANEOUS
Status: DISCONTINUED | OUTPATIENT
Start: 2024-01-25 | End: 2024-01-25 | Stop reason: HOSPADM

## 2024-01-25 RX ORDER — OXYCODONE HCL 5 MG/5 ML
15 SOLUTION, ORAL ORAL ONCE
Status: COMPLETED | OUTPATIENT
Start: 2024-01-25 | End: 2024-01-25

## 2024-01-25 RX ORDER — NALOXONE HYDROCHLORIDE 0.4 MG/ML
0.2 INJECTION, SOLUTION INTRAMUSCULAR; INTRAVENOUS; SUBCUTANEOUS
Status: DISCONTINUED | OUTPATIENT
Start: 2024-01-25 | End: 2024-01-25

## 2024-01-25 RX ORDER — FLUMAZENIL 0.1 MG/ML
0.2 INJECTION, SOLUTION INTRAVENOUS
Status: DISCONTINUED | OUTPATIENT
Start: 2024-01-25 | End: 2024-01-25

## 2024-01-25 RX ORDER — NALOXONE HYDROCHLORIDE 0.4 MG/ML
0.4 INJECTION, SOLUTION INTRAMUSCULAR; INTRAVENOUS; SUBCUTANEOUS
Status: DISCONTINUED | OUTPATIENT
Start: 2024-01-25 | End: 2024-01-25

## 2024-01-25 RX ORDER — LEVOFLOXACIN 750 MG/1
750 TABLET, FILM COATED ORAL DAILY
Qty: 5 TABLET | Refills: 0 | Status: SHIPPED | OUTPATIENT
Start: 2024-01-26 | End: 2024-01-31

## 2024-01-25 RX ADMIN — ALPRAZOLAM 0.5 MG: 0.5 TABLET ORAL at 07:52

## 2024-01-25 RX ADMIN — OXYCODONE HYDROCHLORIDE 15 MG: 5 SOLUTION ORAL at 12:14

## 2024-01-25 RX ADMIN — CARVEDILOL 12.5 MG: 12.5 TABLET, FILM COATED ORAL at 07:49

## 2024-01-25 RX ADMIN — CEFEPIME 2 G: 2 INJECTION, POWDER, FOR SOLUTION INTRAVENOUS at 05:00

## 2024-01-25 RX ADMIN — DEXTROAMPHETAMINE SACCHARATE, AMPHETAMINE ASPARTATE, DEXTROAMPHETAMINE SULFATE AND AMPHETAMINE SULFATE 20 MG: 2.5; 2.5; 2.5; 2.5 TABLET ORAL at 07:49

## 2024-01-25 RX ADMIN — VANCOMYCIN HYDROCHLORIDE 1750 MG: 10 INJECTION, POWDER, LYOPHILIZED, FOR SOLUTION INTRAVENOUS at 15:36

## 2024-01-25 RX ADMIN — ACETAMINOPHEN 650 MG: 325 SOLUTION ORAL at 06:17

## 2024-01-25 RX ADMIN — PANTOPRAZOLE SODIUM 40 MG: 40 TABLET, DELAYED RELEASE ORAL at 07:49

## 2024-01-25 RX ADMIN — CEFEPIME 2 G: 2 INJECTION, POWDER, FOR SOLUTION INTRAVENOUS at 15:57

## 2024-01-25 RX ADMIN — OXYCODONE HYDROCHLORIDE 15 MG: 5 SOLUTION ORAL at 15:53

## 2024-01-25 RX ADMIN — FENTANYL CITRATE 50 MCG: 50 INJECTION, SOLUTION INTRAMUSCULAR; INTRAVENOUS at 13:22

## 2024-01-25 RX ADMIN — ONDANSETRON 8 MG: 8 TABLET, ORALLY DISINTEGRATING ORAL at 00:01

## 2024-01-25 RX ADMIN — OXYCODONE HYDROCHLORIDE 15 MG: 5 TABLET ORAL at 15:36

## 2024-01-25 RX ADMIN — Medication 5 ML: at 14:03

## 2024-01-25 RX ADMIN — ENOXAPARIN SODIUM 80 MG: 80 INJECTION SUBCUTANEOUS at 12:14

## 2024-01-25 RX ADMIN — MIDAZOLAM 1 MG: 1 INJECTION INTRAMUSCULAR; INTRAVENOUS at 13:22

## 2024-01-25 RX ADMIN — ACETAMINOPHEN 650 MG: 325 SOLUTION ORAL at 12:14

## 2024-01-25 RX ADMIN — DIPHENHYDRAMINE HYDROCHLORIDE 25 MG: 50 INJECTION, SOLUTION INTRAMUSCULAR; INTRAVENOUS at 15:08

## 2024-01-25 RX ADMIN — LIDOCAINE HYDROCHLORIDE 4 ML: 10 INJECTION, SOLUTION EPIDURAL; INFILTRATION; INTRACAUDAL; PERINEURAL at 13:55

## 2024-01-25 RX ADMIN — VANCOMYCIN HYDROCHLORIDE 1750 MG: 10 INJECTION, POWDER, LYOPHILIZED, FOR SOLUTION INTRAVENOUS at 00:36

## 2024-01-25 RX ADMIN — DIPHENHYDRAMINE HYDROCHLORIDE 25 MG: 50 INJECTION, SOLUTION INTRAMUSCULAR; INTRAVENOUS at 00:00

## 2024-01-25 RX ADMIN — OXYCODONE HYDROCHLORIDE 15 MG: 5 SOLUTION ORAL at 06:17

## 2024-01-25 RX ADMIN — PREGABALIN 50 MG: 50 CAPSULE ORAL at 07:49

## 2024-01-25 ASSESSMENT — ACTIVITIES OF DAILY LIVING (ADL)
ADLS_ACUITY_SCORE: 24

## 2024-01-25 NOTE — DISCHARGE SUMMARY
[facility]  Hospitalist Discharge Summary      Date of Admission:  1/19/2024  Date of Discharge:  1/25/2024  Discharging Provider: Ajit Espinoza MD  Discharge Service: Hospitalist Service, GOLD TEAM 8    Discharge Diagnoses   E. Faecalis and E. Coli bacteremia     Clinically Significant Risk Factors          Follow-ups Needed After Discharge   Follow up with PCP and pain clinic after discharge     Unresulted Labs Ordered in the Past 30 Days of this Admission       Date and Time Order Name Status Description    1/22/2024 11:11 AM Blood Culture Hand, Right Preliminary     1/22/2024 11:11 AM Blood Culture Hand, Left Preliminary     1/19/2024  9:30 PM Blood Culture Line, Other Preliminary         These results will be followed up by PCP    Discharge Disposition   Discharged to home  Condition at discharge: Stable    Hospital Course   54 yo M with PMH of Celestino-En-Y complicated by short gut syndrome, malnutrition on chronic TPN via Cm, recurrent CLABSI and catheter associated septic subclav DVT on lovenox, who was admitted with recurrent bacteremia.     E. Faecalis and E. Coli bacteremia   In setting of recurrent CLABSI   In setting of indwelling Cm for TPN   Patient initially presented to [place] ED with fever, subsequently found to have positive blood cultures  on 1/18 and 1/19 growing E. Faecalis and E. Coli, confirming bacteremia. He was empirically started on IV vancomycin and cefepime. TTE on 1/22 was negative for vegetations. Cm catheter was removed on 1/23 for a line holiday, with temp midline catheter for IV access as PIVs could not be established.   Repeat blood cultures on 1/20 have remained NGTD.   IR  placed new Cm catheter on 1/25 prior to discharge.   ID followed patient for hospital stay.    Plan -   Continue IV vancomycin until 1/30/2024   Switched from IV cefepime to PO levofloxacin 750 mg daily until 1/30/2024     H/o Obesity s/p gastric  bypass (2002) complicated by   H/o Severe malnutrition due to   Short gut syndrome   With chronic TPN   And venting J tube   Currently without e/o malnutrition at this hospitalization   Patient has been on chronic TPN for malnutrition, with inadequate intake in hospital with decreased PO intake, limited gut absorption and TPN interruption with line holiday in setting of bacteremia. Electrolytes and hydration monitored during hospital stay and remained stable. RD followed patient during stay.    Plan   Resume TPN at discharge   Continue PPI       Chronic medical conditions/comorbidities   Chronic pain  - Patient continued to receive fentanyl patch as prescribed PTA   ADHD - Patient continued to receive Adderall as prescribed PTA   Paroxysmal Afib - Patient continued to receive Carvedilol, HR remained well controlled   DVT - Patient continued Lovenox as prescribed PTA     Updates in afternoon -   Patient was re-examined after Cm placement - he notes pain at site after procedure - no bleeding or hematoma on exam. Given oxycodone 15mg syp x1 for acute post procedural pain ( oxycodone 15 mg tab ordered concurrently was declined by patient for difficulty with swallowing and was wasted per RN).       Consultations This Hospital Stay   PHARMACY TO DOSE [facility]  NUTRITION SERVICES ADULT IP CONSULT  INFECTIOUS DISEASE GENERAL ADULT IP CONSULT  CARE MANAGEMENT / SOCIAL WORK IP CONSULT  INTERVENTIONAL RADIOLOGY ADULT/PEDS IP CONSULT  NURSING TO CONSULT FOR VASCULAR ACCESS CARE IP CONSULT  NURSING TO CONSULT FOR VASCULAR ACCESS CARE IP CONSULT  VASCULAR ACCESS ADULT IP CONSULT  INTERVENTIONAL RADIOLOGY ADULT/PEDS IP CONSULT    Code Status   Full Code    Time Spent on this Encounter   Ajit NJ MD, personally saw the patient today and spent greater than 30 minutes discharging this patient.       Ajit Espinoza MD  [facility] 7C MED SURG  [address] [facility] [address]  Phone:  [phone]  ______________________________________________________________________    Physical Exam   Vital Signs: Temp: 99  F (37.2  C) Temp src: Oral BP: 115/70 Pulse: 69   Resp: 18 SpO2: 99 % O2 Device: None (Room air)      Gen: Awake, sitting on bed, no distress   Eyes: No pallor, no scleral icterus   ENT: MMM   Resp: B/L clear   CVS: RRR, no murmur   Abd: Non tender   Skin: Prior Cm site on chest wall with no erythema, induration or discharge          Primary Care Physician   Chuy Isaac    Discharge Orders      Home Infusion Referral      Reason for your hospital stay    You were admitted to the hospital for a bacterial infection that had spread from your IV Line ( Cm catheter ) to your blood stream.     Activity    Your activity upon discharge: activity as tolerated     Resume Home Care Services     Diet    Follow this diet upon discharge: Resume prior diet       Significant Results and Procedures   Most Recent 3 CBC's:  Recent Labs   Lab Test 01/25/24  0620 01/24/24  0549 01/23/24  0626   WBC 7.7 7.6 8.5   HGB 8.7* 8.3* 9.6*   MCV 79 81 80    247 277     Most Recent 3 BMP's:  Recent Labs   Lab Test 01/25/24  0620 01/24/24  0549 01/23/24  0626    139 140   POTASSIUM 4.4 4.2 3.7   CHLORIDE 108* 106 102   CO2 22 24 26   BUN 16.4 18.1 10.7   CR 0.76 0.77 0.75   ANIONGAP 11 9 12   SILAS 8.6 8.4* 9.1   GLC 91 79 88     Most Recent 2 LFT's:  Recent Labs   Lab Test 01/19/24  2113 01/02/24  0120   AST 17 15   ALT 10 24   ALKPHOS 78 102   BILITOTAL 0.2 0.3  0.3       Discharge Medications   Current Discharge Medication List        START taking these medications    Details   levofloxacin (LEVAQUIN) 750 MG tablet Take 1 tablet (750 mg) by mouth daily for 5 days  Qty: 5 tablet, Refills: 0    Associated Diagnoses: Gram-negative bacteremia      senna-docusate (SENOKOT-S/PERICOLACE) 8.6-50 MG tablet Take 2 tablets by mouth 2 times daily as needed for constipation  Qty: 30 tablet, Refills: 0     "Associated Diagnoses: Constipation, unspecified constipation type      vancomycin 1,750 mg Inject 1,750 mg into the vein every 12 hours for 5 days    Associated Diagnoses: Bacteremia associated with intravascular line, initial encounter  (H24)           CONTINUE these medications which have NOT CHANGED    Details   albuterol (VENTOLIN HFA) 108 (90 Base) MCG/ACT inhaler Inhale 2 puffs into the lungs every 6 hours as needed  Qty: 18 g, Refills: 1    Comments: Pharmacy may dispense brand covered by insurance (Proair, or proventil or ventolin or generic albuterol inhaler)  Associated Diagnoses: SOB (shortness of breath)      ALPRAZolam (XANAX) 0.5 MG tablet TAKE ONE TABLET BY MOUTH TWICE A DAY AS NEEDED FOR SLEEP OR ANXIETY  Qty: 60 tablet, Refills: 0    Associated Diagnoses: Chronic anxiety      amphetamine-dextroamphetamine (ADDERALL) 20 MG tablet TAKE ONE TABLET BY MOUTH ONCE DAILY  Qty: 30 tablet, Refills: 0    Associated Diagnoses: ADHD (attention deficit hyperactivity disorder), inattentive type      carvedilol (COREG) 6.25 MG tablet TAKE 2 TABLETS (12.5 MG) BY MOUTH 2 TIMES DAILY WITH MEALS  Qty: 360 tablet, Refills: 0    Associated Diagnoses: Abnormal echocardiogram      cyanocobalamin (CYANOCOBALAMIN) 1000 MCG/ML injection INJECT 1 ML INTO THE MUSCLE EVERY 30 DAYS  Qty: 1 mL, Refills: 3    Associated Diagnoses: Vitamin B12 deficiency (non anemic)      enoxaparin ANTICOAGULANT (LOVENOX) 80 MG/0.8ML syringe INJECT THE CONTENTS OF ONE SYRINGE (80MG) UNDER THE SKIN TWO TIMES A DAY  Qty: 67.2 mL, Refills: 0    Associated Diagnoses: VTE (venous thromboembolism)      [place] [facility]NAGED PATIENT Contact [facility] for patient specific medication information at [phone] on admission and discharge from the hospital.  Phones are answered 24 hours a day 7 days a week 365 days a year.    Providers - Choose \"CONTINUE HOME MED (no script)\" at discharge if patient treatment with home infusion will " continue.    Comments: Resume prior to admission TPN/Lipids/saline  Associated Diagnoses: S/P bariatric surgery      fentaNYL (DURAGESIC) 25 mcg/hr 72 hr patch Place 1 patch onto the skin every 48 hours Fill 01/26/24 and start 01/28/24. 30 day supply for chronic pain.  Qty: 15 patch, Refills: 0    Associated Diagnoses: Chronic pain syndrome; Visceral hyperalgesia; Chronic abdominal pain; Chronic, continuous use of opioids; Chronic bilateral low back pain without sciatica      Lidocaine (LIDOCARE) 4 % Patch Place 1 patch onto the skin daily as needed for moderate pain To prevent lidocaine toxicity, patient should be patch free for 12 hrs daily.      lipids plant base (CLINOLIPID) 20 % infusion Inject 250 mLs into the vein every 24 hours  Qty: 1000 mL, Refills: 3    Associated Diagnoses: Short bowel syndrome, unspecified whether colon in continuity      naloxone (NARCAN) 4 MG/0.1ML nasal spray Spray 1 spray (4 mg) into one nostril alternating nostrils as needed for opioid reversal every 2-3 minutes until assistance arrives  Qty: 0.2 mL, Refills: 3    Associated Diagnoses: Jejunal intussusception (H)      nystatin (MYCOSTATIN) 508998 UNIT/GM external cream Apply topically 2 times daily  Qty: 30 g, Refills: 1    Associated Diagnoses: Yeast infection of the skin      ondansetron (ZOFRAN ODT) 8 MG ODT tab DISSOLVE ONE TABLET BY MOUTH EVERY 8 HOURS AS NEEDED FOR NAUSEA  Qty: 90 tablet, Refills: 1    Associated Diagnoses: Nausea      oxyCODONE (ROXICODONE) 5 MG/5ML solution Take 5-10 mLs (5-10 mg) by mouth every 4 hours as needed for moderate to severe pain Max of 40mg/day. Fill 01/26/24 and start 01/28/24. 30 day supply for chronic pain.  Qty: 1200 mL, Refills: 0    Associated Diagnoses: Chronic pain syndrome; Visceral hyperalgesia; Chronic abdominal pain; Chronic, continuous use of opioids; Chronic bilateral low back pain without sciatica      pantoprazole (PROTONIX) 40 MG EC tablet TAKE 1 TABLET (40 MG) BY MOUTH  "DAILY  Qty: 30 tablet, Refills: 5    Associated Diagnoses: S/P bariatric surgery      polyethylene glycol (MIRALAX) 17 GM/Dose powder Take 17 g by mouth as needed for constipation      pregabalin (LYRICA) 25 MG capsule Take 25mg at bedtime for 7 days, then 25mg twice daily for 7 days, then 25mg in the AM and 50mg at bedtime for 7 days, then take 50mg twice daily. If side effects, then reduce to last tolerable dosage.  Qty: 120 capsule, Refills: 0    Associated Diagnoses: Visceral hyperalgesia; Neuropathic pain      Syringe/Needle, Disp, (B-D ECLIPSE SYRINGE) 27G X 1/2\" 1 ML MISC 1 Device every 30 days  Qty: 30 each, Refills: 1    Associated Diagnoses: Vitamin B12 deficiency (non anemic)      vitamin D2 (ERGOCALCIFEROL) 89744 units (1250 mcg) capsule Take 1 capsule (50,000 Units) by mouth once a week  Qty: 12 capsule, Refills: 3    Associated Diagnoses: Vitamin D deficiency           STOP taking these medications       sucralfate (CARAFATE) 1 GM/10ML suspension Comments:   Reason for Stopping:             Allergies   Allergies   Allergen Reactions    Bactrim [Sulfamethoxazole-Trimethoprim] Rash    Penicillins Anaphylaxis     Please see Antimicrobial Management Team allergy assessment note 10/10/2018. Patient reported tolerating amoxicillin.  Tolerating cefepime and ceftriaxone without reaction 6/23    Ertapenem Nausea and Vomiting    Doxycycline Rash    Vancomycin Rash     Rash after receiving vancomycin 3/28/16 (infusion reaction?). Tolerated with slower infusion and diphenhydramine premed.  Tolerated 1250mg over 90minutes 7/2023.     "

## 2024-01-25 NOTE — PLAN OF CARE
Goal Outcome Evaluation:        Outcome Evaluation: Patient anxious or new tunneled CVC this am. PRN oxy and tylenol x2. CHG completed. Recieved scheduled vanco and cefepime with benadryl prior to vanco.    Goal Outcome Evaluation:      Plan of Care Reviewed With: patient    Overall Patient Progress: no change    Outcome Evaluation: Patient tolerated IV vanco infusion with   1750mg/500 NaCl over 3 hours with minimal side effects.  IV cefepime x 2 given.  PRNs for pain oxy and tylenol q4 as well as xanax at bedtime. PRN zofran x1 for nausea.  Anxious about what time they will pull his CVC today.   Patient up ad vitor and disconnected prior to leaving floor to smoke 4x during shift. Patient has been educated on potential risks of choosing to leave the unit and the responsibility for patient well-being will belong to the patient. Pt has been informed that admission to hospital is due to need for medical treatment. Education given to the pt on some of the potential risks included but are not limited to:  - Lack of access to nursing and medical intervention.   - Possible missed appointments with MDs, therapies and tests.  - Possible missed medications, antibiotics, management of IV's.      Plan: Continue to follow POC. Update provider with changes.

## 2024-01-25 NOTE — PHARMACY-VANCOMYCIN DOSING SERVICE
Pharmacy Vancomycin Note  Date of Service 2024  Patient's  1970   53 year old, male    Indication: Bacteremia  Day of Therapy: 7  Current vancomycin regimen:  1750 mg IV q12h  Current vancomycin monitoring method: AUC  Current vancomycin therapeutic monitoring goal: 400-600 mg*h/L    Insight[employer] Prediction of Current Vancomycin Regimen    Regimen: 1750 mg IV every 12 hours.  Start time: 12:36 on 2024  Exposure target: AUC24 (range)400-600 mg/L.hr   AUC24,ss: 472 mg/L.hr  Probability of AUC24 > 400: 90 %  Ctrough,ss: 12.3 mg/L  Probability of Ctrough,ss > 20: 5 %  Probability of nephrotoxicity (Lodise CARMELA ): 8 %      Current estimated CrCl = Estimated Creatinine Clearance: 139.4 mL/min (based on SCr of 0.76 mg/dL).    Creatinine for last 3 days  2024:  6:26 AM Creatinine 0.75 mg/dL  2024:  5:49 AM Creatinine 0.77 mg/dL  2024:  6:20 AM Creatinine 0.76 mg/dL    Recent Vancomycin Levels (past 3 days)  2024:  6:26 AM Vancomycin 16.0 ug/mL  2024:  6:20 AM Vancomycin 21.1 ug/mL    Vancomycin IV Administrations (past 72 hours)                     vancomycin (VANCOCIN) 1,750 mg in sodium chloride 0.9 % 500 mL intermittent infusion (mg) 1,750 mg New Bag 24 0036     1,750 mg New Bag 24 1210     1,750 mg New Bag  0034     1,750 mg New Bag 24 1318    vancomycin (VANCOCIN) 1,500 mg in sodium chloride 0.9 % 500 mL intermittent infusion (mg) 1,500 mg New Bag 24 0031     1,500 mg New Bag 24 1217                    Nephrotoxins and other renal medications (From now, onward)      Start     Dose/Rate Route Frequency Ordered Stop    24 0000  vancomycin 1,750 mg         1,750 mg Intravenous EVERY 12 HOURS 24 0825 24 2359    24 1200  vancomycin (VANCOCIN) 1,750 mg in sodium chloride 0.9 % 500 mL intermittent infusion         1,750 mg  over 180 Minutes Intravenous EVERY 12 HOURS 24 0828                 Contrast Orders - past 72  hours (72h ago, onward)      None            Interpretation of levels and current regimen:  Vancomycin level is reflective of -600    Has serum creatinine changed greater than 50% in last 72 hours: No    Renal Function: Stable    Plan:  Continue Current Dose  Vancomycin monitoring method: AUC  Vancomycin therapeutic monitoring goal: 400-600 mg*h/L  Pharmacy will check vancomycin levels as appropriate in 3-5 Days.  Serum creatinine levels will be ordered daily for the first week of therapy and at least twice weekly for subsequent weeks.    Wilberto Jacinto PharmD, [facility]    [phone]  Pager [phone]

## 2024-01-25 NOTE — PHARMACY
[facility]  Parenteral ANtibiotic Review at Departure from Acute Care Collaborative Note     Patient: Parker Acevedo  MRN: [id number]  Allergies: Bactrim [sulfamethoxazole-trimethoprim], Penicillins, Ertapenem, Doxycycline, and Vancomycin    Current Location: Critical access hospital  OPAT to be provided by: [facility]       Line Type: Other (Tunneled CVC)    Diagnosis/Indications: Polymicrobial bacteremia (Enterococcus faecalis, E.coli) likely secondary to CLABSI s/p CVC removal on 1/22/2024  Organism(s): Enterococcus faecalis, E.coli  MRDO? No  Pending Cultures/Microbiological Tests: yes 1/22 repeat blood culture finalization    Inpatient ID involved in developing OPAT plan: Yes - discharge OPAT plan has no changes from ID provider, Dr. Best Gustafson, OPAT plan charted on 1/24/2024    Outpatient ID Follow-up: Referred to another provider for follow-up  Designated Provider: Dr. Chuy Isaac (PCP)    Antimicrobial Regimen / Route Anticipated  Duration Start Date Stop /  Reassess Date   Vancomycin IV 1750 mg every 12 hours/IV 10 days 1/20/2024 (first negative blood culture) 1/30/2024   Levofloxacin 750 mg every 24 hours/PO 10 days 1/20/2024 (first negative blood culture) 1/30/2024     Laboratory Tests and Monitoring Frequency:  (With short (< 7 days) intended outpatient IV antibiotic coruse, no routine laboratory monitoring is warranted)      Therapeutic Drug Monitoring: Vancomycin   - Drug: Vancomycin   - Goal(s): -600   - Level Type & Frequency: No outpatient serum vancomycin level monitoring is warranted with short outpatient course (< 7 days) planned   - Level(s) last checked date: 1/25/2024    Imaging/Miscellaneous Monitoring: None    ID Pharmacist Interventions: None                          Jyothi Burr, PharmD, BCIDP  Pager: [phone]

## 2024-01-25 NOTE — PROGRESS NOTES
Care Management Discharge Note    Discharge Date: 01/25/2024       Discharge Disposition: Home Infusion    Discharge Services: [facility]    Discharge DME: None    Discharge Transportation: family or friend will provide    Private pay costs discussed: Not applicable    Does the patient's insurance plan have a 3 day qualifying hospital stay waiver?  No    PAS Confirmation Code:    Patient/family educated on Medicare website which has current facility and service quality ratings: no    Education Provided on the Discharge Plan: Yes  Persons Notified of Discharge Plans: pt  Patient/Family in Agreement with the Plan: yes    Handoff Referral Completed: Yes    Additional Information:    Pt is ready for discharge. [facility] updated. Pt will have a discharge ride from family/friend.    [facility] (Ph: [phone]; Email: [email or web address]; Fax: [phone])   PTA TPN & Lipids & IVAB (vanco)      Mally Willett, RN  7C RN Care Coordinator  Phone: [phone]  Pager: [phone]  [facility] ([id number]) Saturday & Sunday; ([phone]) [facility] Recognized Holidays   Units: 5A, 5B & 5C  Pager: [phone]  Units: 6B, 6C & 6D    Pager: [phone]  Units: 7A, 7B, 7C & 7D    Pager: [phone]  Units: 6A & ICU   Pager: [phone]  Units: 5 Ortho, 5MS & WB ED Pager: [phone]  Units: 6MS, 8A & 10 ICU  Pager [phone]

## 2024-01-25 NOTE — PROGRESS NOTES
Vascular Access Services Notes:    PIV no longer needed per IR charge RN.      Vasile Landon, BSN, RN [facility]

## 2024-01-25 NOTE — PROCEDURES
[facility]    Procedure: IR Procedure Note    Date/Time: 1/25/2024 2:18 PM    Performed by: Juana Burgos PA-C  Authorized by: Juana Burgos PA-C      UNIVERSAL PROTOCOL   Site Marked: NA  Prior Images Obtained and Reviewed:  Yes  Required items: Required blood products, implants, devices and special equipment available    Patient identity confirmed:  Verbally with patient, arm band, provided demographic data and hospital-assigned identification number  Patient was reevaluated immediately before administering moderate or deep sedation or anesthesia  Confirmation Checklist:  Patient's identity using two indicators, relevant allergies, procedure was appropriate and matched the consent or emergent situation and correct equipment/implants were available  Time out: Immediately prior to the procedure a time out was called    Universal Protocol: the Joint Commission Universal Protocol was followed    Preparation: Patient was prepped and draped in usual sterile fashion       ANESTHESIA    Anesthesia:  Local infiltration  Local Anesthetic:  Lidocaine 1% without epinephrine      SEDATION  Patient Sedated: Yes    Sedation Type:  Moderate (conscious) sedation  Vital signs: Vital signs monitored during sedation    See dictated procedure note for full details.  Findings: Please see nursing note. Patient tolerated procedure well.    Specimens: none    Complications: None    Condition: Stable    Plan: Follow-up per primary team.      PROCEDURE  Describe Procedure: Image-guided placement of left tunneled 6 Fr, 27 cm double lumen CVC via left internal jugular. Aspirates and flushes well. Heparin-locked and ready for immediate use.   Patient Tolerance:  Patient tolerated the procedure well with no immediate complications  Length of time physician/provider present for 1:1 monitoring during sedation: 25

## 2024-01-25 NOTE — PROVIDER NOTIFICATION
Provider notified (name): Gold CC  Reason for notification:High, 7C Gold 8 CC,  T.S Rm 5597 Pt has IR tunneled CVC @ 0830. NPO except meds/ice chips.   Recommendation/request given to provider: Can he have 35.30 ml of oxy/tyl liquid now? NPO since 0000. Thx Meliza  [id number]  Response from provider:

## 2024-01-25 NOTE — PRE-PROCEDURE
GENERAL PRE-PROCEDURE:   Date/Time:  1/25/2024 12:48 PM    Written consent obtained?: Yes    Risks and benefits: Risks, benefits and alternatives were discussed    Consent given by:  Patient  Patient states understanding of procedure being performed: Yes    Patient's understanding of procedure matches consent: Yes    Procedure consent matches procedure scheduled: Yes    Expected level of sedation:  Moderate  Appropriately NPO:  Yes  ASA Class:  2  Mallampati  :  Grade 1- soft palate, uvula, tonsillar pillars, and posterior pharyngeal wall visible  Lungs:  Lungs clear with good breath sounds bilaterally  Heart:  Normal heart sounds and rate  History & Physical reviewed:  History and physical reviewed and no updates needed  Statement of review:  I have reviewed the lab findings, diagnostic data, medications, and the plan for sedation

## 2024-01-25 NOTE — PLAN OF CARE
Shift: [id number]     D: See flowsheets for full assessment & vitals    PRNs: zofran, oxycodone, xanax and tylenol for pain management w/ relief   Labs: no RN managed labs. Continuing to monitor daily labs  Running: L PIV SL     A/R: no acute events this shift. Pt remains vitally stable on RA. NPO at 0000 for new line placement in IR. Pain well managed w/ current regimen. Vanco infusion tolerated well w/ benadryl given before. Q1 hour rounding completed, call light w/in reach and able to make needs known, will continue to monitor     P: continue w/ poc    Goal Outcome Evaluation:      Plan of Care Reviewed With: patient    Overall Patient Progress: no changeOverall Patient Progress: no change    Outcome Evaluation: new line placement tomorrow morning

## 2024-01-25 NOTE — IR NOTE
Patient Name: Parker Acevedo  Medical Record Number: [id number]  Today's Date: 1/25/2024    Procedure: Tunneled central venous catheter placement   Proceduralist: Juana Burgos PA-C  Pathology present: N/A    Procedure Start: 1334  Procedure end: 1410  Sedation medications administered: Midazolam 1 mg, Fentanyl 50 mcg     Report given to: CHRISTY Simon 7C  : N/A    Other Notes: Pt arrived to IR room 2 from . Consent reviewed. Pt denies any questions or concerns regarding procedure. Pt positioned supine and monitored per protocol.     6 Fr tunneled catheter placed on left side. Catheter tip placement verified with imaging and ready for immediate use.  Purple & red lumens each flushed with 2.5 mL of heparin (10 units/mL).    IV not working upon arrival to IR. Replacement x2 attempted with failure after initial dose of sedation (suspected infiltration). Vascular access paged. Juana Burgos PA-C discussed options with Pt. Pt agreed to forego vascular access and proceed without further sedation.       Pt tolerated procedure without any noted complications. Pt transferred back to .

## 2024-01-26 ENCOUNTER — PATIENT OUTREACH (OUTPATIENT)
Dept: CARE COORDINATION | Facility: CLINIC | Age: 54
End: 2024-01-26
Payer: COMMERCIAL

## 2024-01-26 LAB
BACTERIA BLD CULT: ABNORMAL
BACTERIA BLD CULT: ABNORMAL

## 2024-01-26 ASSESSMENT — ACTIVITIES OF DAILY LIVING (ADL): DEPENDENT_IADLS:: TRANSPORTATION

## 2024-01-26 NOTE — PROGRESS NOTES
Clinic Care Coordination Contact  [facility]/Voicemail    [facility]  Hospitalist Discharge Summary       Date of Admission:  1/19/2024  Date of Discharge:  1/25/2024  Discharging Provider: Ajit Espinoza MD  Discharge Service: Hospitalist Service, GOLD TEAM 8        Discharge Diagnoses  E. Faecalis and E. Coli bacteremia     Clinical Data: Care Coordinator Outreach    Outreach Documentation Number of Outreach Attempt   12/28/2023  12:05 PM 1   1/12/2024  11:13 AM 2   1/19/2024  12:47 PM 1   1/26/2024   9:09 AM 1       Left message on patient's voicemail with call back information and requested return call.  Left a reminder message to make a future hospital follow up with PCP  Left a message writer is covering for Kinsey.      Shaniqua Muhammad RN CC  contact information if needed ([phone])    Plan: . Care Coordinator will try to reach patient again in 1-2 business days.    [facility]   Greta Hung RN, Care Coordinator   [facility]'s   E-mail [email or web address]   [phone]

## 2024-01-26 NOTE — PLAN OF CARE
Goal Outcome Evaluation:             A&Ox4, VSS on RA. CVC placed in IR. DL CVC saline locked. Vanco and cefepime given. Pain well managed with oxy and tylenol. Independent in room. Voiding okay. PIV Removed. No acute changes. Discharge education provided. Pt discharged @1800 with wife. All belongings with patient.

## 2024-01-27 LAB
BACTERIA BLD CULT: NO GROWTH
BACTERIA BLD CULT: NO GROWTH

## 2024-01-29 ENCOUNTER — TELEPHONE (OUTPATIENT)
Dept: INTERNAL MEDICINE | Facility: CLINIC | Age: 54
End: 2024-01-29
Payer: COMMERCIAL

## 2024-01-29 NOTE — TELEPHONE ENCOUNTER
Called CHRISTY Patricia back and notified him of PCP approval of requested orders per documentation below.     YADIRA AlvaresN, RN

## 2024-01-29 NOTE — PROGRESS NOTES
Clinic Care Coordination Contact  [facility]: Post-Discharge Note  SITUATION                                                      Admission:    Admission Date: 01/19/24   Reason for Admission: E. Faecalis and E. Coli bacteremia  Discharge:   Discharge Date: 01/25/24  Discharge Diagnosis: E. Faecalis and E. Coli bacteremia    BACKGROUND                                                      Per hospital discharge summary and inpatient provider notes:    Hospital Course  52 yo M with PMH of Celestino-En-Y complicated by short gut syndrome, malnutrition on chronic TPN via Cm, recurrent CLABSI and catheter associated septic subclav DVT on lovenox, who was admitted with recurrent bacteremia.      E. Faecalis and E. Coli bacteremia   In setting of recurrent CLABSI   In setting of indwelling Cm for TPN   Patient initially presented to [place] ED with fever, subsequently found to have positive blood cultures  on 1/18 and 1/19 growing E. Faecalis and E. Coli, confirming bacteremia. He was empirically started on IV vancomycin and cefepime. TTE on 1/22 was negative for vegetations. Cm catheter was removed on 1/23 for a line holiday, with temp midline catheter for IV access as PIVs could not be established.   Repeat blood cultures on 1/20 have remained NGTD.   IR  placed new Cm catheter on 1/25 prior to discharge.   ID followed patient for hospital stay.     Plan -   Continue IV vancomycin until 1/30/2024   Switched from IV cefepime to PO levofloxacin 750 mg daily until 1/30/2024         Severe malnutrition   Due to short gut syndrome   With chronic TPN   And venting J tube   Patient has been on chronic TPN for malnutrition, with inadequate intake in hospital with decreased PO intake, limited gut absorption and TPN interruption with line holiday in setting of bacteremia. Electrolytes and hydration monitored during hospital stay and remained stable. RD followed patient during stay.     Plan   Resume TPN at  discharge   Continue PPI         Chronic medical conditions/comorbidities   Chronic pain  - Patient continued to receive fentanyl patch as prescribed PTA   ADHD - Patient continued to receive Adderall as prescribed PTA   Paroxysmal Afib - Patient continued to receive Carvedilol, HR remained well controlled   DVT - Patient continued Lovenox as prescribed PTA      Updates in afternoon -   Patient was re-examined after Cm placement - he notes pain at site after procedure - no bleeding or hematoma on exam. Given oxycodone 15mg syp x1 for acute post procedural pain ( oxycodone 15 mg tab ordered concurrently was declined by patient for difficulty with swallowing and was wasted per RN).     ASSESSMENT      Enrollment  Outreach Frequency: monthly, more frequently as needed    Discharge Assessment  How are you doing now that you are home?: per patient wife, things are going well. she said the patient is feeling a lot better. she can't think of any needs at this time.  How are your symptoms? (Red Flag symptoms escalate to triage hotline per guidelines): Improved  Do you feel your condition is stable enough to be safe at home until your provider visit?: Yes  Does the patient have their discharge instructions? : Yes  Does the patient have questions regarding their discharge instructions? : No  Were you started on any new medications or were there changes to any of your previous medications? : Yes  Does the patient have all of their medications?: Yes  Do you have questions regarding any of your medications? : No  Do you have all of your needed medical supplies or equipment (DME)?  (i.e. oxygen tank, CPAP, cane, etc.): Yes  Discharge follow-up appointment scheduled within 14 calendar days? : No  Is patient agreeable to assistance with scheduling? : No         Post-op (Clinicians Only)  Did the patient have surgery or a procedure: No  Eating & Drinking: eating and drinking without complaints/concerns  PO Intake: regular  diet  Bowel Function: normal  Urinary Status: voiding without complaint/concerns      PLAN                                                      Outpatient Plan:      Discharge Orders          Home Infusion Referral       Reason for your hospital stay     You were admitted to the hospital for a bacterial infection that had spread from your IV Line ( Cm catheter ) to your blood stream.          Activity     Your activity upon discharge: activity as tolerated      Resume Home Care Services          Diet     Follow this diet upon discharge: Resume prior diet       Future Appointments   Date Time Provider Department Center   2/5/2024  8:30 AM PH INFUSION CHAIR 8 [facility]   2/5/2024  1:30 PM Natalie Hull, APRN CNP [facility]         For any urgent concerns, please contact our 24 hour nurse triage line: [phone] ([phone])       Patient and wife can't think of any needs right now. RN CC will reach out next month as scheduled.     Kinsey Muhammad RN

## 2024-01-29 NOTE — TELEPHONE ENCOUNTER
Home Care is calling regarding an established patient with [facility].       Requesting orders from: Chuy Isaac  Provider is following patient: Yes  Is this a 60-day recertification request?  No    Orders Requested    Skilled Nursing  Request for resumption in care.     1X a week for 6 weeks  Patient recently discharged from hospital  RN is requesting high priority so he can take labs tomorrow, 1/30/2024.        Information was gathered and will be sent to provider for review.  RN will contact Home Care with information after provider review.  Confirmed ok to leave a detailed message with call back.  Contact information confirmed and updated as needed.    Ella Hammond RN

## 2024-02-02 DIAGNOSIS — E44.0 MODERATE PROTEIN-CALORIE MALNUTRITION (H): Primary | ICD-10-CM

## 2024-02-05 ENCOUNTER — INFUSION THERAPY VISIT (OUTPATIENT)
Dept: INFUSION THERAPY | Facility: CLINIC | Age: 54
End: 2024-02-05
Attending: SURGERY
Payer: COMMERCIAL

## 2024-02-05 ENCOUNTER — LAB (OUTPATIENT)
Dept: INFUSION THERAPY | Facility: CLINIC | Age: 54
End: 2024-02-05

## 2024-02-05 VITALS
DIASTOLIC BLOOD PRESSURE: 63 MMHG | HEART RATE: 60 BPM | BODY MASS INDEX: 25.1 KG/M2 | SYSTOLIC BLOOD PRESSURE: 109 MMHG | RESPIRATION RATE: 16 BRPM | WEIGHT: 180 LBS | OXYGEN SATURATION: 99 % | TEMPERATURE: 99.9 F

## 2024-02-05 VITALS
HEART RATE: 66 BPM | SYSTOLIC BLOOD PRESSURE: 102 MMHG | DIASTOLIC BLOOD PRESSURE: 48 MMHG | WEIGHT: 180 LBS | BODY MASS INDEX: 25.1 KG/M2

## 2024-02-05 DIAGNOSIS — D50.8 OTHER IRON DEFICIENCY ANEMIA: Primary | ICD-10-CM

## 2024-02-05 DIAGNOSIS — E46 MALNUTRITION, UNSPECIFIED TYPE (H): ICD-10-CM

## 2024-02-05 DIAGNOSIS — D50.8 IRON DEFICIENCY ANEMIA SECONDARY TO INADEQUATE DIETARY IRON INTAKE: Primary | ICD-10-CM

## 2024-02-05 DIAGNOSIS — E61.1 IRON DEFICIENCY: ICD-10-CM

## 2024-02-05 DIAGNOSIS — E86.0 DEHYDRATION: ICD-10-CM

## 2024-02-05 DIAGNOSIS — R13.10 DYSPHAGIA, UNSPECIFIED TYPE: ICD-10-CM

## 2024-02-05 DIAGNOSIS — E61.1 LOW SERUM IRON: ICD-10-CM

## 2024-02-05 DIAGNOSIS — D50.8 OTHER IRON DEFICIENCY ANEMIA: ICD-10-CM

## 2024-02-05 DIAGNOSIS — E44.0 MODERATE PROTEIN-CALORIE MALNUTRITION (H): ICD-10-CM

## 2024-02-05 DIAGNOSIS — E55.9 VITAMIN D DEFICIENCY: ICD-10-CM

## 2024-02-05 DIAGNOSIS — D50.8 IRON DEFICIENCY ANEMIA SECONDARY TO INADEQUATE DIETARY IRON INTAKE: ICD-10-CM

## 2024-02-05 LAB
ALBUMIN SERPL BCG-MCNC: 3.9 G/DL (ref 3.5–5.2)
ALP SERPL-CCNC: 68 U/L (ref 40–150)
ALT SERPL W P-5'-P-CCNC: 9 U/L (ref 0–70)
ANION GAP SERPL CALCULATED.3IONS-SCNC: 10 MMOL/L (ref 7–15)
AST SERPL W P-5'-P-CCNC: 14 U/L (ref 0–45)
BASOPHILS # BLD AUTO: 0.1 10E3/UL (ref 0–0.2)
BASOPHILS NFR BLD AUTO: 1 %
BILIRUB DIRECT SERPL-MCNC: <0.2 MG/DL (ref 0–0.3)
BILIRUB SERPL-MCNC: 0.2 MG/DL
BUN SERPL-MCNC: 20 MG/DL (ref 6–20)
CALCIUM SERPL-MCNC: 8.5 MG/DL (ref 8.6–10)
CHLORIDE SERPL-SCNC: 106 MMOL/L (ref 98–107)
CREAT SERPL-MCNC: 0.67 MG/DL (ref 0.67–1.17)
DEPRECATED HCO3 PLAS-SCNC: 26 MMOL/L (ref 22–29)
EGFRCR SERPLBLD CKD-EPI 2021: >90 ML/MIN/1.73M2
EOSINOPHIL # BLD AUTO: 0.2 10E3/UL (ref 0–0.7)
EOSINOPHIL NFR BLD AUTO: 2 %
ERYTHROCYTE [DISTWIDTH] IN BLOOD BY AUTOMATED COUNT: 18.2 % (ref 10–15)
FASTING STATUS PATIENT QL REPORTED: YES
GLUCOSE SERPL-MCNC: 102 MG/DL (ref 70–99)
HCT VFR BLD AUTO: 29 % (ref 40–53)
HGB BLD-MCNC: 8.6 G/DL (ref 13.3–17.7)
IMM GRANULOCYTES # BLD: 0 10E3/UL
IMM GRANULOCYTES NFR BLD: 0 %
LYMPHOCYTES # BLD AUTO: 2.4 10E3/UL (ref 0.8–5.3)
LYMPHOCYTES NFR BLD AUTO: 28 %
MAGNESIUM SERPL-MCNC: 1.9 MG/DL (ref 1.7–2.3)
MCH RBC QN AUTO: 22.8 PG (ref 26.5–33)
MCHC RBC AUTO-ENTMCNC: 29.7 G/DL (ref 31.5–36.5)
MCV RBC AUTO: 77 FL (ref 78–100)
MONOCYTES # BLD AUTO: 1.1 10E3/UL (ref 0–1.3)
MONOCYTES NFR BLD AUTO: 12 %
NEUTROPHILS # BLD AUTO: 5 10E3/UL (ref 1.6–8.3)
NEUTROPHILS NFR BLD AUTO: 57 %
NRBC # BLD AUTO: 0 10E3/UL
NRBC BLD AUTO-RTO: 0 /100
PHOSPHATE SERPL-MCNC: 4.2 MG/DL (ref 2.5–4.5)
PLATELET # BLD AUTO: 243 10E3/UL (ref 150–450)
POTASSIUM SERPL-SCNC: 4.1 MMOL/L (ref 3.4–5.3)
PROT SERPL-MCNC: 6.9 G/DL (ref 6.4–8.3)
RBC # BLD AUTO: 3.77 10E6/UL (ref 4.4–5.9)
SODIUM SERPL-SCNC: 142 MMOL/L (ref 135–145)
TRIGL SERPL-MCNC: 74 MG/DL
WBC # BLD AUTO: 8.7 10E3/UL (ref 4–11)

## 2024-02-05 PROCEDURE — 36415 COLL VENOUS BLD VENIPUNCTURE: CPT

## 2024-02-05 PROCEDURE — 250N000011 HC RX IP 250 OP 636: Mod: JZ | Performed by: INTERNAL MEDICINE

## 2024-02-05 PROCEDURE — 36593 DECLOT VASCULAR DEVICE: CPT

## 2024-02-05 PROCEDURE — 250N000011 HC RX IP 250 OP 636: Performed by: INTERNAL MEDICINE

## 2024-02-05 PROCEDURE — 258N000003 HC RX IP 258 OP 636: Performed by: SURGERY

## 2024-02-05 PROCEDURE — 85041 AUTOMATED RBC COUNT: CPT | Performed by: SURGERY

## 2024-02-05 PROCEDURE — 250N000011 HC RX IP 250 OP 636: Performed by: SURGERY

## 2024-02-05 PROCEDURE — 96376 TX/PRO/DX INJ SAME DRUG ADON: CPT

## 2024-02-05 PROCEDURE — 82248 BILIRUBIN DIRECT: CPT | Performed by: SURGERY

## 2024-02-05 PROCEDURE — 96365 THER/PROPH/DIAG IV INF INIT: CPT

## 2024-02-05 PROCEDURE — 80053 COMPREHEN METABOLIC PANEL: CPT | Performed by: SURGERY

## 2024-02-05 PROCEDURE — 83735 ASSAY OF MAGNESIUM: CPT | Performed by: SURGERY

## 2024-02-05 PROCEDURE — 84478 ASSAY OF TRIGLYCERIDES: CPT | Performed by: SURGERY

## 2024-02-05 PROCEDURE — 84100 ASSAY OF PHOSPHORUS: CPT | Performed by: SURGERY

## 2024-02-05 RX ORDER — HEPARIN SODIUM,PORCINE 10 UNIT/ML
5-20 VIAL (ML) INTRAVENOUS DAILY PRN
Status: CANCELLED | OUTPATIENT
Start: 2024-02-05

## 2024-02-05 RX ORDER — ALBUTEROL SULFATE 90 UG/1
1-2 AEROSOL, METERED RESPIRATORY (INHALATION)
Start: 2024-02-05

## 2024-02-05 RX ORDER — METHYLPREDNISOLONE SODIUM SUCCINATE 125 MG/2ML
125 INJECTION, POWDER, LYOPHILIZED, FOR SOLUTION INTRAMUSCULAR; INTRAVENOUS
Start: 2024-02-05

## 2024-02-05 RX ORDER — ALBUTEROL SULFATE 0.83 MG/ML
2.5 SOLUTION RESPIRATORY (INHALATION)
OUTPATIENT
Start: 2024-02-05

## 2024-02-05 RX ORDER — HEPARIN SODIUM,PORCINE 10 UNIT/ML
5-20 VIAL (ML) INTRAVENOUS DAILY PRN
OUTPATIENT
Start: 2024-02-05

## 2024-02-05 RX ORDER — HEPARIN SODIUM (PORCINE) LOCK FLUSH IV SOLN 100 UNIT/ML 100 UNIT/ML
5 SOLUTION INTRAVENOUS
Status: CANCELLED | OUTPATIENT
Start: 2024-02-05

## 2024-02-05 RX ORDER — MEPERIDINE HYDROCHLORIDE 25 MG/ML
25 INJECTION INTRAMUSCULAR; INTRAVENOUS; SUBCUTANEOUS EVERY 30 MIN PRN
OUTPATIENT
Start: 2024-02-05

## 2024-02-05 RX ORDER — HEPARIN SODIUM,PORCINE 10 UNIT/ML
5-20 VIAL (ML) INTRAVENOUS DAILY PRN
Status: DISCONTINUED | OUTPATIENT
Start: 2024-02-05 | End: 2024-02-05 | Stop reason: HOSPADM

## 2024-02-05 RX ORDER — HEPARIN SODIUM (PORCINE) LOCK FLUSH IV SOLN 100 UNIT/ML 100 UNIT/ML
5 SOLUTION INTRAVENOUS
Status: DISCONTINUED | OUTPATIENT
Start: 2024-02-05 | End: 2024-02-05 | Stop reason: HOSPADM

## 2024-02-05 RX ORDER — EPINEPHRINE 1 MG/ML
0.3 INJECTION, SOLUTION, CONCENTRATE INTRAVENOUS EVERY 5 MIN PRN
OUTPATIENT
Start: 2024-02-05

## 2024-02-05 RX ORDER — DIPHENHYDRAMINE HYDROCHLORIDE 50 MG/ML
50 INJECTION INTRAMUSCULAR; INTRAVENOUS
Start: 2024-02-05

## 2024-02-05 RX ADMIN — HEPARIN, PORCINE (PF) 10 UNIT/ML INTRAVENOUS SYRINGE 5 ML: at 12:45

## 2024-02-05 RX ADMIN — HEPARIN, PORCINE (PF) 10 UNIT/ML INTRAVENOUS SYRINGE 5 ML: at 12:46

## 2024-02-05 RX ADMIN — SODIUM CHLORIDE 25 MG: 9 INJECTION, SOLUTION INTRAVENOUS at 10:15

## 2024-02-05 RX ADMIN — SODIUM CHLORIDE 1000 MG: 9 INJECTION, SOLUTION INTRAVENOUS at 11:35

## 2024-02-05 RX ADMIN — ALTEPLASE 2 MG: 2.2 INJECTION, POWDER, LYOPHILIZED, FOR SOLUTION INTRAVENOUS at 09:07

## 2024-02-05 RX ADMIN — ALTEPLASE 2 MG: 2.2 INJECTION, POWDER, LYOPHILIZED, FOR SOLUTION INTRAVENOUS at 09:03

## 2024-02-05 RX ADMIN — SODIUM CHLORIDE 250 ML: 9 INJECTION, SOLUTION INTRAVENOUS at 10:11

## 2024-02-05 ASSESSMENT — PAIN SCALES - GENERAL: PAINLEVEL: NO PAIN (0)

## 2024-02-05 NOTE — PROGRESS NOTES
Infusion Nursing Note:  Parker Acevedo presents today for INFED.    Patient seen by provider today: No   present during visit today: Not Applicable.    Note: PICC line did not have blood return initially. Used Alteplase, waited 30 minutes with success. Here with wife, Rose.      Intravenous Access:  Cm.    Treatment Conditions:  Not Applicable.      Post Infusion Assessment:  Patient tolerated infusion without incident.       Discharge Plan:   Patient discharged in stable condition accompanied by: wife.  Departure Mode: Ambulatory.      Windy Chowdhury RN

## 2024-02-05 NOTE — PROGRESS NOTES
Infusion Nursing Note:  Parker Acevedo presents today for Cm labs.    Patient seen by provider today: No   present during visit today: Not Applicable.    Note: N/A.      Intravenous Access:  Cm. Initially unable to get blood return. Used Alteplase with success. Labs drawn.      Windy Chowdhury RN

## 2024-02-06 ENCOUNTER — MYC REFILL (OUTPATIENT)
Dept: INTERNAL MEDICINE | Facility: CLINIC | Age: 54
End: 2024-02-06
Payer: COMMERCIAL

## 2024-02-06 DIAGNOSIS — F41.9 CHRONIC ANXIETY: ICD-10-CM

## 2024-02-06 DIAGNOSIS — F90.0 ADHD (ATTENTION DEFICIT HYPERACTIVITY DISORDER), INATTENTIVE TYPE: ICD-10-CM

## 2024-02-06 DIAGNOSIS — E53.8 VITAMIN B12 DEFICIENCY (NON ANEMIC): ICD-10-CM

## 2024-02-06 RX ORDER — DEXTROAMPHETAMINE SACCHARATE, AMPHETAMINE ASPARTATE, DEXTROAMPHETAMINE SULFATE AND AMPHETAMINE SULFATE 5; 5; 5; 5 MG/1; MG/1; MG/1; MG/1
TABLET ORAL
Qty: 30 TABLET | Refills: 0 | Status: SHIPPED | OUTPATIENT
Start: 2024-02-06 | End: 2024-03-07

## 2024-02-06 RX ORDER — CYANOCOBALAMIN 1000 UG/ML
1 INJECTION, SOLUTION INTRAMUSCULAR; SUBCUTANEOUS
Qty: 1 ML | Refills: 3 | Status: SHIPPED | OUTPATIENT
Start: 2024-02-06 | End: 2024-07-26

## 2024-02-06 RX ORDER — NEEDLES, SAFETY 18GX1 1/2"
1 NEEDLE, DISPOSABLE MISCELLANEOUS
Qty: 30 EACH | Refills: 1 | Status: SHIPPED | OUTPATIENT
Start: 2024-02-06 | End: 2024-07-26

## 2024-02-06 RX ORDER — ALPRAZOLAM 0.5 MG
TABLET ORAL
Qty: 60 TABLET | Refills: 0 | Status: SHIPPED | OUTPATIENT
Start: 2024-02-06 | End: 2024-03-07

## 2024-02-07 ENCOUNTER — MEDICAL CORRESPONDENCE (OUTPATIENT)
Dept: HEALTH INFORMATION MANAGEMENT | Facility: CLINIC | Age: 54
End: 2024-02-07
Payer: COMMERCIAL

## 2024-02-15 ENCOUNTER — MYC REFILL (OUTPATIENT)
Dept: PALLIATIVE MEDICINE | Facility: CLINIC | Age: 54
End: 2024-02-15

## 2024-02-15 ENCOUNTER — TELEPHONE (OUTPATIENT)
Dept: SURGERY | Facility: CLINIC | Age: 54
End: 2024-02-15
Payer: COMMERCIAL

## 2024-02-15 DIAGNOSIS — G89.29 CHRONIC BILATERAL LOW BACK PAIN WITHOUT SCIATICA: ICD-10-CM

## 2024-02-15 DIAGNOSIS — G89.29 CHRONIC ABDOMINAL PAIN: ICD-10-CM

## 2024-02-15 DIAGNOSIS — M54.50 CHRONIC BILATERAL LOW BACK PAIN WITHOUT SCIATICA: ICD-10-CM

## 2024-02-15 DIAGNOSIS — R10.9 CHRONIC ABDOMINAL PAIN: ICD-10-CM

## 2024-02-15 DIAGNOSIS — Z53.9 DIAGNOSIS NOT YET DEFINED: Primary | ICD-10-CM

## 2024-02-15 DIAGNOSIS — F11.90 CHRONIC, CONTINUOUS USE OF OPIOIDS: ICD-10-CM

## 2024-02-15 DIAGNOSIS — R19.8 VISCERAL HYPERALGESIA: ICD-10-CM

## 2024-02-15 DIAGNOSIS — G89.4 CHRONIC PAIN SYNDROME: ICD-10-CM

## 2024-02-15 NOTE — TELEPHONE ENCOUNTER
Received call from patient requesting refill(s) of      fentaNYL (DURAGESIC) 25 mcg/hr 72 hr patch   Last dispensed from pharmacy on 01/26/24  oxyCODONE (ROXICODONE) 5 MG/5ML solution   Last dispensed from pharmacy on 01/26/24    Patient's last office/virtual visit by prescribing provider on 11/01/23  Next office/virtual appointment scheduled for 02/21/24    Last urine drug screen date 11/01/23  Current opioid agreement on file (completed within the last year) Yes Date of opioid agreement: 11/01/23    E-prescribe to     [facility] [place], [place] - [address] [place] [address]  [address] [facility] [address]  Phone: [phone] Fax: [phone]    Will route to nursing [place] for review and preparation of prescription(s).       Deidra Banks MA  [facility]

## 2024-02-15 NOTE — TELEPHONE ENCOUNTER
Home Care faxed over a resumption of care order on 02/07/2024 to Dr. Vyas, and would like to know the status of that order, Please call Berkley Nguyen, orders tracking coordinator at [phone].

## 2024-02-15 NOTE — TELEPHONE ENCOUNTER
Medication refill information reviewed.     Due date for fentaNYL (DURAGESIC) 25 mcg/hr 72 hr patch and oxyCODONE (ROXICODONE) 5 MG/5ML solution  is 02/27/24     Prescriptions prepped for review.     Will route to provider.

## 2024-02-16 RX ORDER — OXYCODONE HCL 5 MG/5 ML
5-10 SOLUTION, ORAL ORAL EVERY 4 HOURS PRN
Qty: 1200 ML | Refills: 0 | Status: SHIPPED | OUTPATIENT
Start: 2024-02-16 | End: 2024-03-04

## 2024-02-16 RX ORDER — FENTANYL 25 UG/1
1 PATCH TRANSDERMAL
Qty: 15 PATCH | Refills: 0 | Status: SHIPPED | OUTPATIENT
Start: 2024-02-16 | End: 2024-03-04

## 2024-02-16 NOTE — TELEPHONE ENCOUNTER
Signed Prescriptions:                        Disp   Refills    fentaNYL (DURAGESIC) 25 mcg/hr 72 hr patch 15 pat*0        Sig: Place 1 patch onto the skin every 48 hours Fill           02/25/24 and start 02/27/24. 30 day supply for           chronic pain.  Authorizing Provider: NATALIE MCDOWELL    oxyCODONE (ROXICODONE) 5 MG/5ML solution   1200 mL0        Sig: Take 5-10 mLs (5-10 mg) by mouth every 4 hours as           needed for moderate to severe pain Max of           40mg/day. Fill 02/25/24 and start 02/27/24. 30           day supply for chronic pain.  Authorizing Provider: NATALIE MCDOWELL        Reviewed [place]  February 16, 2024- no concerning fills.    Natalie Mcdowell APRN, RN CNP, FNP  [facility]  [facility]

## 2024-02-20 ENCOUNTER — TELEPHONE (OUTPATIENT)
Dept: PALLIATIVE MEDICINE | Facility: CLINIC | Age: 54
End: 2024-02-20
Payer: COMMERCIAL

## 2024-02-20 DIAGNOSIS — G89.29 CHRONIC ABDOMINAL PAIN: ICD-10-CM

## 2024-02-20 DIAGNOSIS — R19.8 VISCERAL HYPERALGESIA: ICD-10-CM

## 2024-02-20 DIAGNOSIS — F11.90 CHRONIC, CONTINUOUS USE OF OPIOIDS: ICD-10-CM

## 2024-02-20 DIAGNOSIS — G89.4 CHRONIC PAIN SYNDROME: ICD-10-CM

## 2024-02-20 DIAGNOSIS — R10.9 CHRONIC ABDOMINAL PAIN: ICD-10-CM

## 2024-02-20 DIAGNOSIS — M54.50 CHRONIC BILATERAL LOW BACK PAIN WITHOUT SCIATICA: ICD-10-CM

## 2024-02-20 DIAGNOSIS — G89.29 CHRONIC BILATERAL LOW BACK PAIN WITHOUT SCIATICA: ICD-10-CM

## 2024-02-20 NOTE — TELEPHONE ENCOUNTER
[facility] Call Center    Phone Message    May a detailed message be left on voicemail: yes     Reason for Call: Other: Patient calling requesting to  his Oxy on the 23rd because the pharmacy is closed on the weekend and they are moving.  Please call him back.      Action Taken: Message routed to:  Other: [facility]    Travel Screening: Not Applicable

## 2024-02-21 NOTE — TELEPHONE ENCOUNTER
It is fine for him to fill the scripts on February 23rd. He has no history of overuse with me.     Let me know if pharmacy needs new scripts or if they will be able to fill on  my verbal order.     Thanks  Natalie MENARD RN CNP, FNP  [facility]  [facility]

## 2024-02-22 ENCOUNTER — TELEPHONE (OUTPATIENT)
Dept: ENDOCRINOLOGY | Facility: CLINIC | Age: 54
End: 2024-02-22
Payer: COMMERCIAL

## 2024-02-22 NOTE — TELEPHONE ENCOUNTER
Care orders signed by Dr Vyas and faxed to [facility] today, left message and call back information

## 2024-02-22 NOTE — TELEPHONE ENCOUNTER
Spoke to pharmacy. Okay to fill tomorrow. Patient notified.     YADIRA LazarN, RN  Care Coordinator  [facility] [place]

## 2024-02-22 NOTE — TELEPHONE ENCOUNTER
General Call    Contacts         Type Contact Phone/Fax    02/22/2024 10:16 AM CST Phone (Incoming) [facility] [phone]          Reason for Call:  they faxed 2/7 resumption of care order, and need to know if rec'd and/or sent back.    [facility]: [phone]      Please call if questions:  Berkley kessler [facility]: [phone]

## 2024-02-23 ENCOUNTER — TELEPHONE (OUTPATIENT)
Dept: INTERNAL MEDICINE | Facility: CLINIC | Age: 54
End: 2024-02-23
Payer: COMMERCIAL

## 2024-02-23 ENCOUNTER — MYC MEDICAL ADVICE (OUTPATIENT)
Dept: INTERNAL MEDICINE | Facility: CLINIC | Age: 54
End: 2024-02-23
Payer: COMMERCIAL

## 2024-02-23 DIAGNOSIS — Z98.84 GASTRIC BYPASS STATUS FOR OBESITY: Primary | ICD-10-CM

## 2024-02-23 RX ORDER — SUCRALFATE ORAL 1 G/10ML
1 SUSPENSION ORAL 4 TIMES DAILY
Qty: 414 ML | Refills: 3 | Status: SHIPPED | OUTPATIENT
Start: 2024-02-23 | End: 2024-04-29

## 2024-02-23 RX ORDER — SUCRALFATE 1 G/1
1 TABLET ORAL 4 TIMES DAILY
Qty: 120 TABLET | Refills: 2 | Status: SHIPPED | OUTPATIENT
Start: 2024-02-23 | End: 2024-02-23

## 2024-02-23 NOTE — TELEPHONE ENCOUNTER
Carafate was  sent.  We can set up a visit to see him and discuss meds by Dr. Orourke next available or hospital follow-up spot that is available

## 2024-02-23 NOTE — TELEPHONE ENCOUNTER
Reason for Call:  Same Day Appointment, Requested Provider:  PCP     PCP: Chuy Isaac    Reason for visit: Med Check    Concerns: Rose (patient's wife) called to get  sucralfate (CARAFATE) 1 GM/10ML suspension [[id number]] (Order [id number]) refilled and was told by her pharmacist that medication was removed from the medication list. This writer informed Rose that our records showed that this medication was last ordered in 2021.   Rose requested a visit with the patients PCP because she wanted to discuss all the medications so that she knew what was going on.        Can we leave a detailed message on this number? yes    Phone number patient can be reached at: Cell number on file:    Telephone Information:   Mobile [phone]       Best Time: any    Call taken on 2/23/2024 at 10:31 AM by Rosamaria Yost

## 2024-02-26 NOTE — TELEPHONE ENCOUNTER
Forms faxed. Louann Bojorquez CMA ([facility])     This 74 year old  female is seen today in followup of type 2 diabetes mellitus, fibromyalgia.    She continues to have some pain with diabetic neuropathy.  She did get a 2nd opinion from another neurologist.  Alpha lipoic acid was suggested as a possible adjunctive treatment to the Lyrica she is on.  She would like to give that a try.    No chest pain recently.      Current Outpatient Medications   Medication Sig Dispense Refill    Lidocaine 4 % Gel Apply to affected area 4x/day as needed for neuropathy pain 1 each 11    Alpha Lipoic Acid 200 MG Cap Take 200 mg by mouth daily. 90 capsule 3    celecoxib (CeleBREX) 200 MG capsule Take 1 capsule by mouth daily. 90 capsule 3    furosemide (LASIX) 40 MG tablet Take 2 tablets by mouth every morning AND 3 tablets every evening. 450 tablet 1    FLUoxetine (PROzac) 20 MG capsule Take 1 capsule by mouth daily. 90 capsule 3    oxyCODONE SR (OxyCONTIN) 80 MG 12 hr tablet Take 1 tablet by mouth every 12 hours. 60 tablet 0    oxyCODONE SR (OxyCONTIN) 40 MG 12 hr tablet Take 1 tablet by mouth daily at noon then, take 1 to 2 tablets every 12 hours as needed for breakthrough pain. Max of 5 tablets per day. 90 tablet 0    oxyCODONE, IMM REL, (ROXICODONE) 5 MG immediate release tablet Take 1 tablet by mouth every 4 hours as needed for pain. Uses infrequently. Only as needed. 30 tablet 0    omeprazole (PrilOSEC) 20 MG capsule Take 1 capsule by mouth in the morning and 1 capsule in the evening. 60 capsule 5    pregabalin (LYRICA) 200 MG capsule Take 1 capsule by mouth at bedtime. Do not start before December 13, 2023. 60 capsule 5    pregabalin (LYRICA) 100 MG capsule Take 1 capsule by mouth every morning AND 1 capsule every evening. Do not start before December 13, 2023. 60 capsule 5    metFORMIN (GLUCOPHAGE) 500 MG tablet Take 2 tablets by mouth in the morning and 2 tablets in the evening. Take with meals. 360 tablet 3    spironolactone (ALDACTONE) 50 MG tablet Take 1 tablet by mouth  daily. 90 tablet 3    simvastatin (ZOCOR) 20 MG tablet Take 1 tablet by mouth daily. 90 tablet 3    nitroGLYCERIN (NITROSTAT) 0.4 MG sublingual tablet Place 1 tablet under the tongue every 5 minutes as needed for Chest pain. Max of 6 tablets per day. 25 tablet 1    pramipexole (Mirapex) 0.5 MG tablet Take 4 tablets by mouth nightly. 120 tablet 2    cloNIDine (CATAPRES) 0.1 MG tablet Take 2 tablets by mouth in the morning and 2 tablets at noon and 2 tablets in the evening. 540 tablet 3    carisoprodol (SOMA) 350 MG tablet Take 1 tablet by mouth 4 times daily as needed for Muscle spasms. 360 tablet 5    amLODIPine (NORVASC) 10 MG tablet Take 1 tablet by mouth daily. 90 tablet 3    phentermine (ADIPEX-P) 37.5 MG tablet Take 1 tablet by mouth daily (before breakfast). 30 tablet 0    latanoprost (XALATAN) 0.005 % ophthalmic solution Place 1 drop into right eye at bedtime. 2.5 mL 3    fluorometholone (FML) 0.1 % ophthalmic suspension Place 1 drop into both eyes 4 times daily. 5 mL 1    acyclovir (ZOVIRAX) 200 MG capsule Take 1 capsule by mouth 4 times daily. 360 capsule 1    blood glucose (Accu-Chek Guide) test strip Test blood sugar 1 time daily as directed. 100 each 3    naLOXone (NARCAN) 4 MG/0.1ML nasal spray Spray the content of 1 device into 1 nostril. Call 911. May repeat with 2nd device in alternate nostril if no response in 2-3 minutes. 2 each 0    calcium carbonate (OS-SINA) 600 MG Tab Take 600 mg by mouth every 48 hours.      B Complex-C (B-complex with vitamin C) tablet Take 1 tablet by mouth every 48 hours.      Multiple Vitamins-Minerals (ONE-A-DAY 50 PLUS PO) Take 1 tablet by mouth daily.      Cholecalciferol (VITAMIN D3) 125 mcg (5,000 units) capsule Take 10,000 Units by mouth daily.       polyethylene glycol (MIRALAX) powder Take 17 g by mouth daily for 3 days. Dissolve powder in 240 mL water 255 g 2    ketoconazole (NIZORAL) 2 % cream Apply 1 application to affected areas daily. 60 g 11    Blood Glucose  Monitoring Suppl (ACCU-CHEK ELISSA SMARTVIEW) w/Device Kit 1 kit by Other route as directed. Lot No. [id number]; Exp, 08/31/2018 1 kit 0    acetaminophen (TYLENOL) 500 MG tablet Take 500-1,500 mg by mouth every 6 hours as needed.      docusate sodium (COLACE) 100 MG capsule Take 300 mg by mouth daily as needed.        No current facility-administered medications for this visit.       Allergies as of 02/26/2024 - Reviewed 02/26/2024   Allergen Reaction Noted    Cymbalta [duloxetine hcl] Other (See Comments) 06/27/2014    Gabapentin HEADACHES, NAUSEA, and Other (See Comments) 09/10/2014    Meloxicam SWELLING 02/27/2017    Pentazocine HIVES 09/28/2011    Talwin VOMITING     Tetracycline RASH     Tizanidine hydrochloride VOMITING and PRURITUS 03/23/2018       PHYSICAL EXAMINATION:  Visit Vitals  /76 (BP Location: LUE - Left upper extremity, Patient Position: Sitting, Cuff Size: Large Adult)   Pulse 80   Ht 5' 5\" (1.651 m)   Wt 98 kg (216 lb)   BMI 35.94 kg/m²     General: Awake, alert, oriented to person, and in no distress.   Chest:  Clear to auscultation, normal respiratory effort.   Heart:  Regular rate. No rub, murmur, S3   Extremities: No edema     LABS:  Lab Services on 02/21/2024   Component Date Value    Fasting Status 02/21/2024 12     Sodium 02/21/2024 139     Potassium 02/21/2024 3.3 (L)     Chloride 02/21/2024 99     Carbon Dioxide 02/21/2024 35 (H)     Anion Gap 02/21/2024 8     Glucose 02/21/2024 110 (H)     BUN 02/21/2024 24 (H)     Creatinine 02/21/2024 0.66     Glomerular Filtration Ra* 02/21/2024 >90     BUN/Cr 02/21/2024 36 (H)     Calcium 02/21/2024 9.7     Hemoglobin A1C 02/21/2024 7.1 (H)     Vitamin B1, Whole Blood 02/21/2024 182 (H)     Treponema Pallidum Antib* 02/21/2024 Nonreactive     Vitamin B12 02/21/2024 506     Vitamin B6, Plasma 02/21/2024 84.4     TSH 02/21/2024 1.952     RBC Sedimentation Rate 02/21/2024 40 (H)     Protein, Total 02/21/2024 7.1     Total Globulin 02/21/2024 3.1      Albumin 02/21/2024 4.0     Alpha 1 02/21/2024 0.3     Alpha 2 02/21/2024 0.7     Beta 02/21/2024 1.0     Gamma 02/21/2024 1.0     Serum Protein Electropho* 02/21/2024 Normal electrophoretic pattern. No monoclonal protein is identified.    Interpretation by Deepthi Almanza MD         Lyme Disease Antibody Sc* 02/21/2024 Negative     Kappa, Free 02/21/2024 2.34 (H)     Lambda, Free 02/21/2024 1.84     Kappa/ Lambda Ratio 02/21/2024 1.27     Serum Immunofixation Pat* 02/21/2024 No monoclonal protein is identified.    Interpretation by Deepthi Almanza MD         Immunoglobulin G 02/21/2024 820     Immunoglobulin A 02/21/2024 404 (H)     Immunoglobulin M 02/21/2024 158     Glutamic Acid Decarboxyl* 02/21/2024 <5.0     Folate 02/21/2024 >24.0     JOSE LUIS Screen With Antibody* 02/21/2024 Negative     Tissue Transglutaminase * 02/21/2024 1     Immunoglobulin A 02/21/2024 292     Copper, Blood 02/21/2024 129     Ceruloplasmin 02/21/2024 30.2     WBC 02/21/2024 6.5     RBC 02/21/2024 4.90     HGB 02/21/2024 14.2     HCT 02/21/2024 44.0     MCV 02/21/2024 89.8     MCH 02/21/2024 29.0     MCHC 02/21/2024 32.3     RDW-CV 02/21/2024 12.7     RDW-SD 02/21/2024 41.5     PLT 02/21/2024 160     NRBC 02/21/2024 0     Neutrophil, Percent 02/21/2024 56     Lymphocytes, Percent 02/21/2024 36     Mono, Percent 02/21/2024 6     Eosinophils, Percent 02/21/2024 2     Basophils, Percent 02/21/2024 0     Immature Granulocytes 02/21/2024 0     Absolute Neutrophils 02/21/2024 3.6     Absolute Lymphocytes 02/21/2024 2.4     Absolute Monocytes 02/21/2024 0.4     Absolute Eosinophils  02/21/2024 0.1     Absolute Basophils 02/21/2024 0.0     Absolute Immature Granul* 02/21/2024 0.0        ASSESSMENT/PLAN:    Type 2 diabetes mellitus:  A1c shows reasonable control at 7.1.  Continue current treatment.  Continue to work at diet management.    Fibromyalgia with chronic pain: Continue current treatment regimen.    Diabetic neuropathy:  We will start alpha  lipoic acid 200 mg daily.    Follow up:  3 months with labs 3 days prior

## 2024-02-28 ENCOUNTER — PATIENT OUTREACH (OUTPATIENT)
Dept: CARE COORDINATION | Facility: CLINIC | Age: 54
End: 2024-02-28
Payer: COMMERCIAL

## 2024-02-28 NOTE — PROGRESS NOTES
Clinic Care Coordination Contact  [facility]/Voicemail    Clinical Data: Care Coordinator Outreach    Outreach Documentation Number of Outreach Attempt   1/19/2024  12:47 PM 1   1/26/2024   9:09 AM 1   2/28/2024   1:02 PM 1       Left message on patient's voicemail with call back information and requested return call.    Plan: Care Coordinator sent care coordination introduction letter on 5/20/2022 via [employer]. Care Coordinator will try to reach patient again in 10 business days.    Kinsey Muhammad RN Care Coordination   [facility]  Email: [email or web address]  Phone: [phone]

## 2024-03-04 ENCOUNTER — VIRTUAL VISIT (OUTPATIENT)
Dept: PALLIATIVE MEDICINE | Facility: CLINIC | Age: 54
End: 2024-03-04
Payer: COMMERCIAL

## 2024-03-04 DIAGNOSIS — M54.50 CHRONIC BILATERAL LOW BACK PAIN WITHOUT SCIATICA: ICD-10-CM

## 2024-03-04 DIAGNOSIS — F11.90 CHRONIC, CONTINUOUS USE OF OPIOIDS: ICD-10-CM

## 2024-03-04 DIAGNOSIS — G89.29 CHRONIC ABDOMINAL PAIN: ICD-10-CM

## 2024-03-04 DIAGNOSIS — R10.9 CHRONIC ABDOMINAL PAIN: ICD-10-CM

## 2024-03-04 DIAGNOSIS — G89.29 CHRONIC BILATERAL LOW BACK PAIN WITHOUT SCIATICA: ICD-10-CM

## 2024-03-04 DIAGNOSIS — R19.8 VISCERAL HYPERALGESIA: Primary | ICD-10-CM

## 2024-03-04 DIAGNOSIS — M79.2 NEUROPATHIC PAIN: ICD-10-CM

## 2024-03-04 DIAGNOSIS — G89.4 CHRONIC PAIN SYNDROME: ICD-10-CM

## 2024-03-04 PROCEDURE — 99214 OFFICE O/P EST MOD 30 MIN: CPT | Mod: 93 | Performed by: NURSE PRACTITIONER

## 2024-03-04 PROCEDURE — G2211 COMPLEX E/M VISIT ADD ON: HCPCS | Performed by: NURSE PRACTITIONER

## 2024-03-04 RX ORDER — PREGABALIN 25 MG/1
25 CAPSULE ORAL AT BEDTIME
Qty: 120 CAPSULE | Refills: 0 | Status: SHIPPED | OUTPATIENT
Start: 2024-03-04 | End: 2024-06-15

## 2024-03-04 RX ORDER — OXYCODONE HCL 5 MG/5 ML
5-10 SOLUTION, ORAL ORAL EVERY 4 HOURS PRN
Qty: 1200 ML | Refills: 0 | Status: SHIPPED | OUTPATIENT
Start: 2024-03-04 | End: 2024-04-04

## 2024-03-04 RX ORDER — FENTANYL 25 UG/1
1 PATCH TRANSDERMAL
Qty: 15 PATCH | Refills: 0 | Status: SHIPPED | OUTPATIENT
Start: 2024-03-04 | End: 2024-04-04

## 2024-03-04 ASSESSMENT — PAIN SCALES - GENERAL: PAINLEVEL: MODERATE PAIN (4)

## 2024-03-04 NOTE — PATIENT INSTRUCTIONS
Plan:   Physical Therapy: none  Clinical Health Psychologist to address issues of relaxation, behavioral change, coping style, and other factors important to improvement: none  Continue gentle movement at home  Diagnostic Studies: none  Medication Management:   Continue fentanyl patch 25mcg/hr every 48 hours, fill 3/25 and start 3/28  Oxycodone liquid 5mg/5ml,  use 5-10mg (5-10mls) every 4 hours as needed, max of 40mg (40ml) per day. Fill 3/25 and start 3/28  Continue Lyrica 25mg at bedtime  Further procedures recommended: none  Recommendations/follow-up for PCP:  See above  Release of information: none  Follow up: 12 weeks for in-person or video visit. Please call [phone] to make your follow-up appointment with me.     ==================  Clinic Number:  [phone]   Call with any questions about your care and for scheduling assistance.   Calls are returned Monday through Friday between 8 AM and 4:30 PM. We usually get back to you within 2 business days depending on the issue/request.    If we are prescribing your medications:  For opioid medication refills, call the clinic or send a [employer] message 7 days in advance.  Please include:  Name of requested medication  Name of the pharmacy.  For non-opioid medications, call your pharmacy directly to request a refill. Please allow 3-4 days to be processed.   Per [place] State Law:  All controlled substance prescriptions must be filled within 30 days of being written.    For those controlled substances allowing refills, pickup must occur within 30 days of last fill.      We believe regular attendance is key to your success in our program!    Any time you are unable to keep your appointment we ask that you call us at least 24 hours in advance to cancel.This will allow us to offer the appointment time to another patient.   Multiple missed appointments may lead to dismissal from the clinic.

## 2024-03-04 NOTE — PROGRESS NOTES
Parker is a 53 year old who is being evaluated via a billable telephone visit.      What phone number would you like to be contacted at? [phone]   How would you like to obtain your AVS? Ellyhart  Originating Location (pt. Location): Home    Distant Location (provider location):  On-site        Is Pt currently in [place]? Yes    NOTE:  If Pt is not in [place], Appointment needs to be canceled and rescheduled.           5/23/2023     7:59 AM 8/23/2023    10:59 AM 3/4/2024     8:56 AM   PEG Score   PEG Total Score 5.67 5.33 7        Anastasiia Scott MA  [facility]         [facility]    3/4/2024    Chief complaint:   -Ongoing abdominal pain  -feels this is stress-related increase due to moving and down-sizing.       Interval history:  Parker Acevedo is a 53 year old male is known to me for:  Visceral hyperalgesia  Chronic abdominal pain  Chronic pain syndrome  PMHx includes: ADHD, anxiety, cardiomyopathy and nutritional diseases, chronic abdominal pain, central line associated bloodstream infection recurrent, anesthesia complication, difficulty swallowing, gastric ulcer unspecified as acute or chronic without mention of hemorrhage perforation or obstruction, gastroesophageal reflux disease, head injury, hiatal hernia, other bladder disorder, other chronic pain, postop nausea and vomiting, severe malnutrition on TPN, short gut syndrome, tobacco abuse  PSHx includes: Appendectomy, back surgery (11/3/2014), biopsy of cervical lymph node (2/20/2015), cholecystectomy, colonoscopy (2021, 2022), cervical anterior 1 level discectomy and fusion (2/15/2017), endoscopic insertion tube gastrostomy (9/9/2013), endoscopic ultrasound upper gastrointestinal tract (4/29/2011), endoscopic ultrasound upper gastrointestinal tract (9/9/2013), endoscopic ultrasound upper gastrointestinal tract (2/24/2021), endoscopy (3/25/2011, 8/4/2009, 1/5/2009), multiple EGDs, gastrectomy  (6/22/2012), gastrojejunostomy (8/26/2009), umbilical hernia repair (2006), hernia raphe hiatal (6/22/2012), herniorrhaphy inguinal (11/22/2013), insert PICC line on the right (12/19/2019), insert PICC line right (2/21/2020), insert vascular access port on the right (12/19/2017, 8/2/2018), multiple interventional radiology CVC tunnel placement and removals, exploratory laparotomy (11/22/2013), orthopedic surgery, Celestino-en-Y gastric bypass (2003), tonsillectomy.        Recommendations/plan at the last visit on 11/1/2023 included:  Physical Therapy: none  Clinical Health Psychologist to address issues of relaxation, behavioral change, coping style, and other factors important to improvement: none  Continue gentle movement at home  Diagnostic Studies: none  Medication Management:   Continue fentanyl patch 25mcg/hr every 48 hours, fill 11/27 and start 11/29  Oxycodone liquid 5mg/5ml,  use 5-10mg (5-10mls) every 4 hours as needed, max of 40mg (40ml) per day. Fill 11/27 and start 11/29  START Lyrica 25mg as directed.   Further procedures recommended: none  Recommendations/follow-up for PCP:  See above  Release of information: none  UDT today, last took oxycodone at 0800 today and is wearing the fentanyl patch  Signed CSA today  Follow up: 12 weeks for in-person or video visit. Please call [phone] to make your follow-up appointment with me.           Since his last visit, Parker Acevedo reports:    Interval history March 4, 2024  -chronic neuropathic abdominal pain remains.   -he states his pain is worse right now.   -they are in the middle of moving and significantly downsizing. This has been quite stressful, so he believes the increase in pain is stress related.   -stable on current medication regimen       Interval history November 1, 2023  -had exploratory laparoscopy on 9/30 given jujunal intussusception   -ongoing abdominal pain, has had multiple complex abdominal surgeries over the years.   -his pain is  stable on current dosing of fentanyl patch and liquid oxycodone.   -spirits are good    Interval history August 23, 2023  -he was in the hospital overnight from 8/6 to 8/7, had bacteremia  -they are having their home reroofed, re-sided and remodeled due to black mold.   -they have to move out due to the black mold and health issues while remediating   -his abdominal pain had been up when he was ill. Then got to baseline.   -his stress levels are high now due to the black mold so his stress/anxiety is high.      Interval history May 23, 2023  -he has ongoing abdominal pain. He will now feel very ill about 3-4 times per month. When he feels like this, he often needs to go to the ER and get his medications via IV.   -he was not able to complete his colonoscopy as he was not as cleaned out enough.   -discussed possible Lyrica or Cymbalta. He prefers to keep his medications the same.    Interval history February 6, 2023  -struck his head on a cupboard reaching for a glass in the kitchen and cracked his head, had to get stiches above his eye in the forehead.   -he states he didn't have a work up for a concussion.   -he has had more headaches since striking his head.   -encouraged him to see his Primary Care Provider   -he has had nausea prior to the head injury.   -he has had nausea and vomiting that comes and goes. It is not worse now after the head injury.   -he has a lot of swelling around the laceration per patient report, I encouraged him to be seen in urgent care for evaluation.     Pain history collected at initial visit on 5/27/2022  He had gastric bypass in 2003 and about 5 years after that, he developed gastric ulcers. He ended up having surgery for gastric ulcer and hernias and such. He has had over 11 surgeries for his abdomen. He is malnourished due to short-gut syndrome. He has a J-tube that is open to vent bile from his stomach as he gets bloated.   Worst pain is inside the abdominal cavity. He will have  "pain so bad that he states he screams for his mother. He feels that this is a deep inside pain.   -he would like to increase his oxycodone dose by one dose per day. His activity is limited by not having medication to cover him for his daily activities.      -he has a DVT in his right arm and in his right jugular vein. He is on lovenox.         At this point, the patient's participation with our multidisciplinary team includes:  The patient has been compliant with the program.  PT - none  Health Psych - none      Pain scores:   Pain right now is 4/10.   Pain average score is 5/10.   Pain range is 2-10+/10. His pain will pop up but dwindles over about a half-hour after taking his medication.   Pain is described as \"sharp, burning, agonizing and like on fire or like pirahnas gnawing me inside out, dull pain on the side.\"  Pain is constant in nature    Current pain relevant medications:   -tylenol 32mg/ml liquid take 15.65mls (500mg) Q 4 hours PRN fever/mild pain  (somewhat helpful for headaches)  --Duragesic 25mcg/hr Q 48 hours (helpful)  -lidoderm 5% patch PRN (helpful)  -Narcan nasal spray for opiate reversal   -Oxycodone 5mg/5ml solution take 5-10mls (5-10mg) TID PRN max of 40mg/day with 4 hours between doses.      Other pertinent medications:  -Adderall 20mg every day  -LOVENOX 120mg Subcutaneously every day  -lorazepam 1mg for anxiety with TPN and meds once per day (uses most nights)  -Zofran 8mg Q 8 hours PRN     Previous medication treatments included:  OPIATES: Fentanyl (helpful), morphine (hallucinations), Dilaudid (not helpful, did not dissolve), Tylenol #3 (not helpful), hydrocodone (not helpful), Belbuca (not helpful--he had a really heavy chest feeling)  NSAIDS: cannot take due to severe gastric ulcer disease  MUSCLE RELAXANTS: none  ANTI-MIGRAINE MEDS: none  ANTI-DEPRESSANTS: none  SLEEP AIDS: benadryl (helpful)  ANTI-CONVULSANTS: gabapentin (bad headaches and acid reflux),  TOPICALS: Lidocaine patches " (helpful)  ANXIOLYTICS: valium (helpful 5mg dosage), lorazepam (helpful)  MEDICAL CANNABIS: not interested  Other meds: tylenol (helpful for headaches)        Other treatments have included:  Parker Acevedo has been seen at a pain clinic in the past.  Previously managed here by Dr. Jessica Milligan. Also seen by [facility] for possible implanted intrathecal pain pump, he is not candidate due to infection risk.   PT: tried, never helped his neck or abdominal surgery  Massage Therapy: helpful for a day or two  Chiropractic care: tried, helped some   Acupuncture: tried, not helpful  TENs Unit: tried, not helpful  Healing Touch: not helpful     Injections:   -previously had injections for his neck with Dr. Jessica Milligan      Surgeries:  9/30/2023 exploratory laparotomy, reduction of intussusception, resection of small bowel with primary anastamosis, and upper endoscopy with Dr. Mercado (helpful)      THE 4 A's OF OPIOID MAINTENANCE ANALGESIA    Analgesia: keeps pain pretty manageable    Activity: he walks daily, light housekeeping    Adverse effects: none    Adherence to Rx protocol: yes      Side Effects: no side effect  Patient is using the medication as prescribed: YES    Medications:  Current Outpatient Medications   Medication Sig Dispense Refill    albuterol (VENTOLIN HFA) 108 (90 Base) MCG/ACT inhaler Inhale 2 puffs into the lungs every 6 hours as needed 18 g 1    ALPRAZolam (XANAX) 0.5 MG tablet TAKE ONE TABLET BY MOUTH TWICE A DAY AS NEEDED FOR SLEEP OR ANXIETY 60 tablet 0    amphetamine-dextroamphetamine (ADDERALL) 20 MG tablet TAKE ONE TABLET BY MOUTH ONCE DAILY 30 tablet 0    carvedilol (COREG) 6.25 MG tablet TAKE 2 TABLETS (12.5 MG) BY MOUTH 2 TIMES DAILY WITH MEALS 360 tablet 0    cyanocobalamin (CYANOCOBALAMIN) 1000 MCG/ML injection Inject 1 mL (1,000 mcg) into the muscle every 30 days 1 mL 3    enoxaparin ANTICOAGULANT (LOVENOX) 80 MG/0.8ML syringe INJECT THE CONTENTS OF ONE SYRINGE (80MG) UNDER THE SKIN  "TWO TIMES A DAY 67.2 mL 0    [place] [facility] PATIENT Contact [facility] for patient specific medication information at [phone] on admission and discharge from the hospital.  Phones are answered 24 hours a day 7 days a week 365 days a year.    Providers - Choose \"CONTINUE HOME MED (no script)\" at discharge if patient treatment with home infusion will continue.      fentaNYL (DURAGESIC) 25 mcg/hr 72 hr patch Place 1 patch onto the skin every 48 hours Fill 02/25/24 and start 02/27/24. 30 day supply for chronic pain. 15 patch 0    Lidocaine (LIDOCARE) 4 % Patch Place 1 patch onto the skin daily as needed for moderate pain To prevent lidocaine toxicity, patient should be patch free for 12 hrs daily.      lipids plant base (CLINOLIPID) 20 % infusion Inject 250 mLs into the vein every 24 hours 1000 mL 3    naloxone (NARCAN) 4 MG/0.1ML nasal spray Spray 1 spray (4 mg) into one nostril alternating nostrils as needed for opioid reversal every 2-3 minutes until assistance arrives 0.2 mL 3    nystatin (MYCOSTATIN) 938504 UNIT/GM external cream Apply topically 2 times daily 30 g 1    ondansetron (ZOFRAN ODT) 8 MG ODT tab DISSOLVE ONE TABLET BY MOUTH EVERY 8 HOURS AS NEEDED FOR NAUSEA 90 tablet 1    oxyCODONE (ROXICODONE) 5 MG/5ML solution Take 5-10 mLs (5-10 mg) by mouth every 4 hours as needed for moderate to severe pain Max of 40mg/day. Fill 02/25/24 and start 02/27/24. 30 day supply for chronic pain. 1200 mL 0    pantoprazole (PROTONIX) 40 MG EC tablet TAKE 1 TABLET (40 MG) BY MOUTH DAILY 30 tablet 5    polyethylene glycol (MIRALAX) 17 GM/Dose powder Take 17 g by mouth as needed for constipation      pregabalin (LYRICA) 25 MG capsule Take 25mg at bedtime for 7 days, then 25mg twice daily for 7 days, then 25mg in the AM and 50mg at bedtime for 7 days, then take 50mg twice daily. If side effects, then reduce to last tolerable dosage. 120 capsule 0    senna-docusate (SENOKOT-S/PERICOLACE) 8.6-50 " "MG tablet Take 2 tablets by mouth 2 times daily as needed for constipation 30 tablet 0    sucralfate (CARAFATE) 1 GM/10ML suspension Take 10 mLs (1 g) by mouth 4 times daily 414 mL 3    Syringe/Needle, Disp, (B-D ECLIPSE SYRINGE) 27G X 1/2\" 1 ML MISC 1 Device every 30 days 30 each 1    vitamin D2 (ERGOCALCIFEROL) 08693 units (1250 mcg) capsule Take 1 capsule (50,000 Units) by mouth once a week 12 capsule 3       Medical History: any changes in medical history since they were last seen? No    Social History:   Home situation:  to Vandana, one son in late 20's   Occupation/Schooling: he used to work at [employer]. He is on disability, SSDI  Tobacco use: smokes 1/2 ppd, discussed smoking cessation today, he is not ready at present time  Alcohol use: none  Drug use: none  History of chemical dependency treatment: none    Is patient a current smoker or tobacco user?  Down to 3-4 cigarettes per day  If yes, was cessation counseling offered?  Yes          Physical Exam:  Vital signs: There were no vitals taken for this visit.    Behavioral observations:  Awake, alert. Cooperative.   Pulm: respirations easy and unlabored. Able to speak in full sentences without SOB or cough noted.              Diagnostic tests:  CT ABDOMEN PELVIS W CONTRAST  2/16/2019 3:00 AM      HISTORY: Right-sided abdominal pain, status post gastric bypass.  Patient has G-tube with blood and history of ulcers.     TECHNIQUE: CT abdomen and pelvis with 85 mL Isovue-370 IV. Radiation  dose for this scan was reduced using automated exposure control,  adjustment of the mA and/or kV according to patient size, or iterative  reconstruction technique.     COMPARISON: 1/13/2015.     FINDINGS:  Abdomen: There is mild dependent atelectasis at the lung bases. The  heart size is normal. Small hiatal hernia. There is mild biliary  dilatation into the pancreas head which is probably normal post  cholecystectomy. The liver otherwise appears normal. The " gallbladder  is absent. The spleen, pancreas, adrenal glands and kidneys are normal  in appearance. There is a G-tube in place. No abdominal or pelvic  lymph node enlargement.     Pelvis: The ascending colon and transverse colon are very distended  with gas, stool and fluid. The descending and rectosigmoid colon are  nondilated. Transition point is in the region of the splenic flexure,  but there is no convincing obstruction. Small bowel is normal in  caliber. The appendix is normal. There is no free intraperitoneal gas  or fluid.                                                                      IMPRESSION:  1. The ascending and transverse colon are distended with gas, stool  and fluid. Distal colon is nondilated. No focal obstruction is  evident.  2. Small hiatal hernia.     IMANI HU MD        MR CERVICAL SPINE WITHOUT CONTRAST  1/2/2017  2:44 PM     HISTORY: Injury to neck 2 weeks ago with development of suspected  radicular pain to the left upper extremity with weakness of thumb and  index finger.     COMPARISON: Plain films 12/22/2016.     TECHNIQUE: Routine MR cervical spine extended through T2.     FINDINGS: Vertebral body bone marrow signal is normal and the  alignment is normal through T2. No malignant or destructive changes.  The cervical and upper thoracic spinal cord appear intrinsically  normal through T2. Craniocervical junction region is normal. No  paraspinous soft tissue abnormality.     Findings by level as follows:     C2-C3: Negative. No disc protrusion. No central or lateral stenosis.     C3-C4: Negative.     C4-C5: Very tiny central disc protrusion. No significant central or  lateral stenosis.     C5-C6: Moderate degenerative narrowing of the interspace. Mild  broad-based disc osteophyte complex and small uncinate spurs. Minimal  central stenosis and minimal bilateral foraminal stenosis.     C6-C7: Moderate degenerative narrowing of the interspace. No disc  protrusion. No central or  lateral stenosis.     C7-T1: Negative.                                                                      IMPRESSION:  1. Degenerative changes as described, most marked at C5-C6 and C6-C7.  2. No intrinsic cord abnormality through T2.     MARTHA MCCARTY MD           MR LUMBAR SPINE W/O & W CONTRAST 1/6/2019 3:49 PM     Provided History: Back pain, > 6wks conservative tx, persistent sx;  pain in the left paraspinal region- now with fungemia, persistent  blood stream infections since Oct 2018.     Comparison: No similar studies     Technique: Sagittal T1-weighted and T2-weighted and axial T2-weighted  images of the lumbar spine were obtained without intravenous contrast.  Post intravenous contrast using gadolinium axial and sagittal  T1-weighted images were obtained with fat saturation.     Contrast: 8.9CC GADAVIST     Findings: Regarding numbering convention, there are 5 lumbar-type  vertebrae assumed for the purposes of this dictation.  The tip of the  conus medullaris is at L1.  Regarding alignment, the lumbar vertebral  column appears normally aligned.  There is no significant disc height  narrowing at any level.  Regarding bone marrow signal intensity, no  abnormality is visualized on STIR images.     On a level by level basis:     L1-2: No significant spinal canal or foraminal narrowing.     L2-3: Minimal disc bulge. Mild thickening of the ligamentum flavum. No  significant spinal canal or foraminal narrowing.     L3-4: Mild disc bulge and thickening of the ligamentum flavum with  mild spinal canal narrowing. No neural foraminal narrowing.     L4-5: Mild disc bulge and thickening of the ligamentum flavum. No  central spinal canal narrowing or neuroforaminal stenosis.      L5-S1: No significant spinal canal or foraminal narrowing.     3 bandlike areas of high STIR signal and enhancement in the right  posterior paraspinous muscles.                                                                       Impression:  1. No abnormal signal within the spine to suggest fungal infection.  Mild nonspecific enhancement and STIR hyperintensity in the right  posterior paraspinous muscles, potentially myositis.  2. Multilevel lumbar spondylosis.     I have personally reviewed the examination and initial interpretation  and I agree with the findings.     YOLA TELLEZ MD           Other testing (labs, diagnostics):  2/5/2024  Cr. 0.67  Est GFR >90        Screening tools:      DIRE Score for ongoing opioid management is calculated as follows:     Diagnosis = 2     Intractability = 2     Risk: Psych = 3  Chem Hlth = 2  Reliability = 2  Social = 2     Efficacy = 2     Total DIRE Score = 15 (14 or higher predicts good candidate for ongoing opioid management; 13 or lower predicts poor candidate for opioid management)         [place] Prescription Monitoring Program:  Reviewed [place]  3/4/2024- no concerning fills.  Natalie MENARD, RN CNP, FNP  [facility]  [place] location          Assessment:   Visceral hyperalgesia  Chronic abdominal pain  Neuropathic pain  Chronic bilateral low back pain without sciatica  Chronic pain syndrome  Chronic continuous use of opioids    Encounter for long term use of opiate analgesic (current use)    CSA 11/1/2023  UDT 11/1/2023  Long term current use of opiate analgesic  PMHx includes: ADHD, anxiety, cardiomyopathy and nutritional diseases, chronic abdominal pain, central line associated bloodstream infection recurrent, anesthesia complication, difficulty swallowing, gastric ulcer unspecified as acute or chronic without mention of hemorrhage perforation or obstruction, gastroesophageal reflux disease, head injury, hiatal hernia, other bladder disorder, other chronic pain, postop nausea and vomiting, severe malnutrition on TPN, short gut syndrome, tobacco abuse  PSHx includes: Appendectomy, back surgery (11/3/2014), biopsy of cervical lymph node (2/20/2015),  cholecystectomy, colonoscopy (2021, 2022), cervical anterior 1 level discectomy and fusion (2/15/2017), endoscopic insertion tube gastrostomy (9/9/2013), endoscopic ultrasound upper gastrointestinal tract (4/29/2011), endoscopic ultrasound upper gastrointestinal tract (9/9/2013), endoscopic ultrasound upper gastrointestinal tract (2/24/2021), endoscopy (3/25/2011, 8/4/2009, 1/5/2009), multiple EGDs, gastrectomy (6/22/2012), gastrojejunostomy (8/26/2009), umbilical hernia repair (2006), hernia raphe hiatal (6/22/2012), herniorrhaphy inguinal (11/22/2013), insert PICC line on the right (12/19/2019), insert PICC line right (2/21/2020), insert vascular access port on the right (12/19/2017, 8/2/2018), multiple interventional radiology CVC tunnel placement and removals, exploratory laparotomy (11/22/2013), orthopedic surgery, Celestino-en-Y gastric bypass (2003), tonsillectomy.        Plan:   Physical Therapy: none  Clinical Health Psychologist to address issues of relaxation, behavioral change, coping style, and other factors important to improvement: none  Continue gentle movement at home  Diagnostic Studies: none  Medication Management:   Continue fentanyl patch 25mcg/hr every 48 hours, fill 3/25 and start 3/28  Oxycodone liquid 5mg/5ml,  use 5-10mg (5-10mls) every 4 hours as needed, max of 40mg (40ml) per day. Fill 3/25 and start 3/28  Continue Lyrica 25mg at bedtime  Further procedures recommended: none  Recommendations/follow-up for PCP:  See above  Release of information: none  Follow up: 12 weeks for in-person or video visit. Please call [phone] to make your follow-up appointment with me.       ASSESSMENT AND PLAN:  (R19.8) Visceral hyperalgesia  (primary encounter diagnosis)  Comment:   Plan: fentaNYL (DURAGESIC) 25 mcg/hr 72 hr patch,         oxyCODONE (ROXICODONE) 5 MG/5ML solution,         pregabalin (LYRICA) 25 MG capsule, Adult Pain         Clinic Follow-Up Order            (R10.9,  G89.29) Chronic abdominal  pain  Comment:   Plan: fentaNYL (DURAGESIC) 25 mcg/hr 72 hr patch,         oxyCODONE (ROXICODONE) 5 MG/5ML solution, Adult        Pain Clinic Follow-Up Order            (M79.2) Neuropathic pain  Comment:   Plan: pregabalin (LYRICA) 25 MG capsule, Adult Pain         Clinic Follow-Up Order            (M54.50,  G89.29) Chronic bilateral low back pain without sciatica  Comment:   Plan: fentaNYL (DURAGESIC) 25 mcg/hr 72 hr patch,         oxyCODONE (ROXICODONE) 5 MG/5ML solution, Adult        Pain Clinic Follow-Up Order            (F11.90) Chronic, continuous use of opioids  Comment:   Plan: fentaNYL (DURAGESIC) 25 mcg/hr 72 hr patch,         oxyCODONE (ROXICODONE) 5 MG/5ML solution, Adult        Pain Clinic Follow-Up Order            (G89.4) Chronic pain syndrome  Comment:   Plan: fentaNYL (DURAGESIC) 25 mcg/hr 72 hr patch,         oxyCODONE (ROXICODONE) 5 MG/5ML solution, Adult        Pain Clinic Follow-Up Order              BILLING TIME DOCUMENTATION:   TOTAL TIME includes:   Time spent preparing to see the patient: 1 minutes (reviewing records and tests)  Time spend face to face with the patient: 21 minutes  Time spent ordering tests, medications, procedures and referrals: 0 minutes  Time spent Referring and communicating with other healthcare professionals: 0 minutes  Documenting clinical information in Epic: 2 minutes    The total TIME spent on this patient on the day of the appointment was 24 minutes.          Natalie EMNARD, RN CNP, FNP  [facility]

## 2024-03-05 ENCOUNTER — LAB REQUISITION (OUTPATIENT)
Dept: LAB | Facility: CLINIC | Age: 54
End: 2024-03-05
Payer: COMMERCIAL

## 2024-03-05 DIAGNOSIS — R13.10 DYSPHAGIA, UNSPECIFIED: ICD-10-CM

## 2024-03-05 LAB
ALBUMIN SERPL BCG-MCNC: 4 G/DL (ref 3.5–5.2)
ALP SERPL-CCNC: 69 U/L (ref 40–150)
ALT SERPL W P-5'-P-CCNC: 14 U/L (ref 0–70)
ANION GAP SERPL CALCULATED.3IONS-SCNC: 9 MMOL/L (ref 7–15)
AST SERPL W P-5'-P-CCNC: 16 U/L (ref 0–45)
BASOPHILS # BLD AUTO: 0 10E3/UL (ref 0–0.2)
BASOPHILS NFR BLD AUTO: 1 %
BILIRUB DIRECT SERPL-MCNC: <0.2 MG/DL (ref 0–0.3)
BILIRUB SERPL-MCNC: 0.4 MG/DL
BUN SERPL-MCNC: 12.6 MG/DL (ref 6–20)
CALCIUM SERPL-MCNC: 9.1 MG/DL (ref 8.6–10)
CHLORIDE SERPL-SCNC: 106 MMOL/L (ref 98–107)
CREAT SERPL-MCNC: 0.73 MG/DL (ref 0.67–1.17)
DEPRECATED HCO3 PLAS-SCNC: 26 MMOL/L (ref 22–29)
EGFRCR SERPLBLD CKD-EPI 2021: >90 ML/MIN/1.73M2
EOSINOPHIL # BLD AUTO: 0.2 10E3/UL (ref 0–0.7)
EOSINOPHIL NFR BLD AUTO: 4 %
ERYTHROCYTE [DISTWIDTH] IN BLOOD BY AUTOMATED COUNT: 24.9 % (ref 10–15)
FASTING STATUS PATIENT QL REPORTED: NORMAL
GLUCOSE SERPL-MCNC: 112 MG/DL (ref 70–99)
HCT VFR BLD AUTO: 37.4 % (ref 40–53)
HGB BLD-MCNC: 11.5 G/DL (ref 13.3–17.7)
IMM GRANULOCYTES # BLD: 0 10E3/UL
IMM GRANULOCYTES NFR BLD: 0 %
LYMPHOCYTES # BLD AUTO: 1.7 10E3/UL (ref 0–5.3)
LYMPHOCYTES NFR BLD AUTO: 33 %
MAGNESIUM SERPL-MCNC: 1.9 MG/DL (ref 1.7–2.3)
MCH RBC QN AUTO: 26.1 PG (ref 26.5–33)
MCHC RBC AUTO-ENTMCNC: 30.7 G/DL (ref 31.5–36.5)
MCV RBC AUTO: 85 FL (ref 78–100)
MONOCYTES # BLD AUTO: 0.6 10E3/UL (ref 0–1.3)
MONOCYTES NFR BLD AUTO: 11 %
NEUTROPHILS # BLD AUTO: 2.7 10E3/UL (ref 1.6–8.3)
NEUTROPHILS NFR BLD AUTO: 51 %
NRBC # BLD AUTO: 0 10E3/UL
NRBC BLD AUTO-RTO: 0 /100
PHOSPHATE SERPL-MCNC: 4.1 MG/DL (ref 2.5–4.5)
PLATELET # BLD AUTO: 179 10E3/UL (ref 150–450)
POTASSIUM SERPL-SCNC: 3.7 MMOL/L (ref 3.4–5.3)
PROT SERPL-MCNC: 7.1 G/DL (ref 6.4–8.3)
RBC # BLD AUTO: 4.41 10E6/UL (ref 4.4–5.9)
SODIUM SERPL-SCNC: 141 MMOL/L (ref 135–145)
TRIGL SERPL-MCNC: 93 MG/DL
WBC # BLD AUTO: 5.2 10E3/UL (ref 4–11)

## 2024-03-05 PROCEDURE — 36592 COLLECT BLOOD FROM PICC: CPT | Performed by: SURGERY

## 2024-03-05 PROCEDURE — 84478 ASSAY OF TRIGLYCERIDES: CPT | Performed by: SURGERY

## 2024-03-05 PROCEDURE — 84100 ASSAY OF PHOSPHORUS: CPT | Performed by: SURGERY

## 2024-03-05 PROCEDURE — 82248 BILIRUBIN DIRECT: CPT | Performed by: SURGERY

## 2024-03-05 PROCEDURE — 80053 COMPREHEN METABOLIC PANEL: CPT | Performed by: SURGERY

## 2024-03-05 PROCEDURE — 85025 COMPLETE CBC W/AUTO DIFF WBC: CPT | Performed by: SURGERY

## 2024-03-05 PROCEDURE — 83735 ASSAY OF MAGNESIUM: CPT | Performed by: SURGERY

## 2024-03-06 ENCOUNTER — TELEPHONE (OUTPATIENT)
Dept: SURGERY | Facility: CLINIC | Age: 54
End: 2024-03-06
Payer: COMMERCIAL

## 2024-03-06 NOTE — TELEPHONE ENCOUNTER
Hello,    [facility] home infusion discontinue routine flushing with heparin for maintenance of IV lines in January.    Per pt's spouse, Rose, they have continued to flush with heparin since Parker had a new line placed in January 25th, 2024 and would like to continue to do so.      His line is currently functioning well, given his history I would agree that routine heparin flush would be ok for him.      Would you approve of heparin 50 units/5 mL flush to each lumen of IV access daily and after each infusion?    Thank you!    Jyothi Peraza, PharmD [facility]   Ph: [phone]  Fax: [phone]

## 2024-03-07 ENCOUNTER — MYC REFILL (OUTPATIENT)
Dept: INTERNAL MEDICINE | Facility: CLINIC | Age: 54
End: 2024-03-07
Payer: COMMERCIAL

## 2024-03-07 DIAGNOSIS — F90.0 ADHD (ATTENTION DEFICIT HYPERACTIVITY DISORDER), INATTENTIVE TYPE: ICD-10-CM

## 2024-03-07 DIAGNOSIS — F41.9 CHRONIC ANXIETY: ICD-10-CM

## 2024-03-07 DIAGNOSIS — R11.0 NAUSEA: ICD-10-CM

## 2024-03-07 RX ORDER — ALPRAZOLAM 0.5 MG
TABLET ORAL
Qty: 60 TABLET | Refills: 0 | Status: SHIPPED | OUTPATIENT
Start: 2024-03-07 | End: 2024-04-04

## 2024-03-07 RX ORDER — ONDANSETRON 8 MG/1
TABLET, ORALLY DISINTEGRATING ORAL
Qty: 90 TABLET | Refills: 1 | Status: SHIPPED | OUTPATIENT
Start: 2024-03-07 | End: 2024-05-09

## 2024-03-07 RX ORDER — DEXTROAMPHETAMINE SACCHARATE, AMPHETAMINE ASPARTATE, DEXTROAMPHETAMINE SULFATE AND AMPHETAMINE SULFATE 5; 5; 5; 5 MG/1; MG/1; MG/1; MG/1
TABLET ORAL
Qty: 30 TABLET | Refills: 0 | Status: SHIPPED | OUTPATIENT
Start: 2024-03-07 | End: 2024-04-04

## 2024-03-08 ENCOUNTER — TELEPHONE (OUTPATIENT)
Dept: INTERNAL MEDICINE | Facility: CLINIC | Age: 54
End: 2024-03-08
Payer: COMMERCIAL

## 2024-03-08 NOTE — TELEPHONE ENCOUNTER
Home Care is calling regarding an established patient with [facility].    Requesting orders from: Chuy Isaac  Provider is following patient: Yes  Is this a 60-day recertification request?  Yes    Orders Requested    Skilled Nursing  Request for recertification   Frequency:  1x/wk for 9 wks      Information was gathered and will be sent to provider to confirm provider will be following patient.  RN will contact Home Care with information after provider review.  Confirmed ok to leave a detailed message with call back.  Contact information confirmed and updated as needed.    Jessica Nixon RN

## 2024-03-13 ENCOUNTER — PATIENT OUTREACH (OUTPATIENT)
Dept: CARE COORDINATION | Facility: CLINIC | Age: 54
End: 2024-03-13
Payer: COMMERCIAL

## 2024-03-13 NOTE — LETTER
[facility] CARE COORDINATION  [address] [facility] [address]  Phone: [phone]      March 13, 2024      Parker Acevedo  [address] [facility] [address]    Dear Parker,    We have been trying to reach you to introduce you to [facility] s Care Coordination program.  The goal of care coordination is to help you manage your health and improve access to the [facility] system in the most efficient manner.  The Care Coordinator is a nurse who understands the healthcare system and will assist you in improving your access to care.     As your Physician and Care Coordinator we partner to help you achieve your health care goals.     We will continue to reach out; however, if you are able to call your Care Coordinator at [phone], that would be appreciated.  We at [facility] are focused on providing you with the highest-quality healthcare experience possible.      It is a pleasure to partner with you as we work towards achieving your optimal state of wellness.        Sincerely,    CHRISTY Abdul Paul D None   [address] [facility] [address]

## 2024-03-13 NOTE — LETTER
[facility]  Patient Centered Plan of Care  About Me:        Patient Name:  Parker Acevedo    YOB: 1970  Age:         53 year old   [facility] MRN:    [id number] Telephone Information:  Home Phone [phone]   Mobile [phone]       Address:  [address] [place] [address] [address] [place] [address] Email address:  [email or web address]      Emergency Contact(s)    Name Relationship Lgl Grd Work Phone Home Phone Mobile Phone   1. NAHUM RAMOS* Spouse No  [phone]   2. JEYSON CAIN Sister-in-Law No  [phone]   3. MARELY ACEVEDO* Mother No  [phone]            Primary language:  English     needed? No   [facility]:  [phone] op. 1  Other communication barriers:Glasses; Physical impairment    Preferred Method of Communication:  MyChart  Current living arrangement: I live in a private home with spouse    Mobility Status/ Medical Equipment: Independent        Health Maintenance  Health Maintenance Reviewed: Not assessed      My Access Plan  Medical Emergency 911   Primary Clinic Line [facility]* - [phone]   24 Hour Appointment Line [phone] or  [phone]) (toll-free)   24 Hour Nurse Line [phone] (toll-free)   Preferred Urgent Care Other     Preferred Hospital [facility]  [phone]     Preferred Pharmacy [facility] [address]     Behavioral Health Crisis Line The National Suicide Prevention Lifeline at 1-856.785.5093 or Text/Call 828           My Care Team Members  Patient Care Team         Relationship Specialty Notifications Start End    Chuy Isaac MD PCP - General Internal Medicine  7/3/23     Phone: [phone] Fax: [phone]         [address] [facility] [address]    Patti Milligan MD (Inactive) MD Pain Clinic  6/2/15     Phone: [phone] Fax: [phone]         [address] [facility] [address] [facility] [address]    Chuy Isaac MD Assigned PCP   11/11/18     Phone: [phone] Fax: [phone]          [facility] [address]    Marlyn Jenkins MD MD Ophthalmology  1/29/19     Phone: [phone] Fax: [phone]         [address] [facility] [address] [facility] [address]    Marlyn Jenkins MD MD Ophthalmology  2/11/19     Phone: [phone] Fax: [phone]         [address] [facility] [address] [facility] [address]    Kinsey Muhammad, RN Lead Care Coordinator  Admissions 5/20/22     Natalie Hull APRN CNP Assigned Neuroscience Provider   6/11/22     Phone: [phone] Fax: [phone] [address] [facility] [address]    Beatriz Hancock, RN Registered Nurse   7/16/22     Eduardo Butler PA-C Assigned Musculoskeletal Provider   10/1/22     Phone: [phone] Fax: [phone]         [address] [facility] [address]    Natalie Hull APRN CNP Assigned Pain Medication Provider   1/9/23     Phone: [phone] Fax: [phone] [address] [place] [address]    Eduardo Maynard MD MD Cardiovascular Disease  1/31/23     Phone: [phone] Fax: [phone]         [address] [place] [address]    Arabella Vines MD MD Cardiovascular Disease  5/17/23     Phone: [phone] Pager: [phone] Fax: [phone] [address] [facility] [address] [facility] [address]    Francis Vyas MD Surgeon Surgery  6/21/23     Phone: [phone] Fax: [phone]         [address] [facility] [address] [facility] [address]    Eduardo Maynard MD MD Cardiovascular Disease  7/24/23     Phone: [phone] Fax: [phone] [address] [facility] [address]    Eduardo Maynard MD Assigned Heart and Vascular Provider   9/23/23     Phone: [phone] Fax: [phone] [address] [facility] [address]    Francis Vyas MD Assigned Surgical Provider    10/28/23     Phone: [phone] Fax: [phone]         [address] [facility] [address] [facility] [address]                My Care Plans  Self Management and Treatment Plan    Care Plan  Care Plan: Annual Wellness       Problem: Appointment       Goal: I will complete my annual wellness visit.       Start Date: 5/20/2022 Expected End Date: 10/2/2022    This Visit's Progress: 20% Recent Progress: 20%    Note:     Barriers: complex care, and high volume of appointments at baseline  Strengths: wife is pts main caregiver, and organizes his appointments.   Patient expressed understanding of goal: yes  Action steps to achieve this goal:  1. I will schedule my annual wellness visit; [phone] ([phone])  2. I will attend my annual wellness visit.  3. I will contact my Care Management or clinic team if I have barriers to attending my annual wellness visit.                                Action Plans on File:                       Advance Care Plans/Directives:   Advanced Care Plan/Directives on file:   Yes    Status of Document(s): No data recorded  Advanced Care Plan/Directives Type:   Advanced Directive - On File           My Medical and Care Information  Problem List   Patient Active Problem List   Diagnosis    Peptic ulcer disease    Gastric bypass status for obesity    Chronic abdominal pain    Vomiting    Anemia    ADHD (attention deficit hyperactivity disorder), inattentive type    Vitamin B12 deficiency without anemia    Thiamine deficiency    Dehydration    Dysphagia    Weight loss, non-intentional    Malnutrition (H24)    Chronic anxiety    Constipation    Bile reflux esophagitis    Former smoker    Vitamin D deficiency    Iron deficiency    Health Care Home    Chronic pain    Insomnia    Positive blood culture    Fungemia    Chronic nausea    Short gut syndrome    Anxiety    Status post cervical spinal arthrodesis    Port or reservoir infection, initial encounter    Abnormal echocardiogram    Low serum iron     Anemia, iron deficiency    Catheter-related bloodstream infection (CRBSI)    Generalized weakness    S/P bariatric surgery    Hyperlipidemia LDL goal <130    Infection by Candida species    PICC line infiltration, sequela    Gram-positive bacteremia    On total parenteral nutrition    Gastric outlet obstruction    Short bowel syndrome    Bloodstream infection associated with central venous catheter, initial encounter    Fever, unknown origin    Acute deep vein thrombosis (DVT) of axillary vein of right upper extremity (H)    Bacteremia associated with intravascular line, initial encounter  (H24)    Gram-negative bacteremia    Cellulitis of left upper extremity    Acute deep vein thrombosis (DVT) of axillary vein of left upper extremity (H)    Jejunal intussusception (H)        Current Medications and Allergies:    Allergies   Allergen Reactions    Bactrim [Sulfamethoxazole-Trimethoprim] Rash    Penicillins Anaphylaxis     Please see Antimicrobial Management Team allergy assessment note 10/10/2018. Patient reported tolerating amoxicillin.  Tolerating cefepime and ceftriaxone without reaction 6/23    Ertapenem Nausea and Vomiting    Doxycycline Rash    Vancomycin Rash     Rash after receiving vancomycin 3/28/16 (infusion reaction?). Tolerated with slower infusion and diphenhydramine premed.  Tolerated 1250mg over 90minutes 7/2023.     Current Outpatient Medications   Medication    albuterol (VENTOLIN HFA) 108 (90 Base) MCG/ACT inhaler    ALPRAZolam (XANAX) 0.5 MG tablet    amphetamine-dextroamphetamine (ADDERALL) 20 MG tablet    carvedilol (COREG) 6.25 MG tablet    cyanocobalamin (CYANOCOBALAMIN) 1000 MCG/ML injection    enoxaparin ANTICOAGULANT (LOVENOX) 80 MG/0.8ML syringe    [facility]FUSION MANAGED PATIENT    fentaNYL (DURAGESIC) 25 mcg/hr 72 hr patch    Lidocaine (LIDOCARE) 4 % Patch    lipids plant base (CLINOLIPID) 20 % infusion    naloxone (NARCAN) 4 MG/0.1ML nasal spray    nystatin (MYCOSTATIN) 600219  "UNIT/GM external cream    ondansetron (ZOFRAN ODT) 8 MG ODT tab    oxyCODONE (ROXICODONE) 5 MG/5ML solution    pantoprazole (PROTONIX) 40 MG EC tablet    polyethylene glycol (MIRALAX) 17 GM/Dose powder    pregabalin (LYRICA) 25 MG capsule    senna-docusate (SENOKOT-S/PERICOLACE) 8.6-50 MG tablet    sucralfate (CARAFATE) 1 GM/10ML suspension    Syringe/Needle, Disp, (B-D ECLIPSE SYRINGE) 27G X 1/2\" 1 ML MISC    vitamin D2 (ERGOCALCIFEROL) 77204 units (1250 mcg) capsule     No current facility-administered medications for this visit.         Care Coordination Start Date: 5/20/2022   Frequency of Care Coordination: monthly, more frequently as needed     Form Last Updated: 03/13/2024       "

## 2024-03-13 NOTE — PROGRESS NOTES
Clinic Care Coordination Contact  [facility]/Voicemail    Clinical Data: Care Coordinator Outreach    Outreach Documentation Number of Outreach Attempt   2/28/2024   1:02 PM 1   3/13/2024   2:29 PM 2       Left message on patient's voicemail with call back information and requested return call.    Plan: Care Coordinator will send unable to contact letter with care coordinator contact information via [employer]. Care Coordinator will try to reach patient again in 1 month.    Kinsey Muhammad RN Care Coordination   [facility] [place]  Email: [email or web address]  Phone: [phone]

## 2024-04-04 ENCOUNTER — MYC REFILL (OUTPATIENT)
Dept: PALLIATIVE MEDICINE | Facility: CLINIC | Age: 54
End: 2024-04-04
Payer: COMMERCIAL

## 2024-04-04 ENCOUNTER — MYC REFILL (OUTPATIENT)
Dept: INTERNAL MEDICINE | Facility: CLINIC | Age: 54
End: 2024-04-04
Payer: COMMERCIAL

## 2024-04-04 DIAGNOSIS — R19.8 VISCERAL HYPERALGESIA: ICD-10-CM

## 2024-04-04 DIAGNOSIS — R93.1 ABNORMAL ECHOCARDIOGRAM: ICD-10-CM

## 2024-04-04 DIAGNOSIS — G89.29 CHRONIC ABDOMINAL PAIN: ICD-10-CM

## 2024-04-04 DIAGNOSIS — F41.9 CHRONIC ANXIETY: ICD-10-CM

## 2024-04-04 DIAGNOSIS — R10.9 CHRONIC ABDOMINAL PAIN: ICD-10-CM

## 2024-04-04 DIAGNOSIS — G89.4 CHRONIC PAIN SYNDROME: ICD-10-CM

## 2024-04-04 DIAGNOSIS — F11.90 CHRONIC, CONTINUOUS USE OF OPIOIDS: ICD-10-CM

## 2024-04-04 DIAGNOSIS — G89.29 CHRONIC BILATERAL LOW BACK PAIN WITHOUT SCIATICA: ICD-10-CM

## 2024-04-04 DIAGNOSIS — M54.50 CHRONIC BILATERAL LOW BACK PAIN WITHOUT SCIATICA: ICD-10-CM

## 2024-04-04 DIAGNOSIS — F90.0 ADHD (ATTENTION DEFICIT HYPERACTIVITY DISORDER), INATTENTIVE TYPE: ICD-10-CM

## 2024-04-05 ENCOUNTER — TELEPHONE (OUTPATIENT)
Dept: INTERNAL MEDICINE | Facility: CLINIC | Age: 54
End: 2024-04-05
Payer: COMMERCIAL

## 2024-04-05 DIAGNOSIS — F90.0 ADHD (ATTENTION DEFICIT HYPERACTIVITY DISORDER), INATTENTIVE TYPE: Primary | ICD-10-CM

## 2024-04-05 RX ORDER — OXYCODONE HCL 5 MG/5 ML
5-10 SOLUTION, ORAL ORAL EVERY 4 HOURS PRN
Qty: 1200 ML | Refills: 0 | Status: SHIPPED | OUTPATIENT
Start: 2024-04-05 | End: 2024-05-09

## 2024-04-05 RX ORDER — DEXTROAMPHETAMINE SACCHARATE, AMPHETAMINE ASPARTATE, DEXTROAMPHETAMINE SULFATE AND AMPHETAMINE SULFATE 5; 5; 5; 5 MG/1; MG/1; MG/1; MG/1
TABLET ORAL
Qty: 30 TABLET | Refills: 0 | Status: SHIPPED | OUTPATIENT
Start: 2024-04-05 | End: 2024-04-29

## 2024-04-05 RX ORDER — ALPRAZOLAM 0.5 MG
TABLET ORAL
Qty: 60 TABLET | Refills: 0 | Status: SHIPPED | OUTPATIENT
Start: 2024-04-05 | End: 2024-04-29

## 2024-04-05 RX ORDER — FENTANYL 25 UG/1
1 PATCH TRANSDERMAL
Qty: 15 PATCH | Refills: 0 | Status: SHIPPED | OUTPATIENT
Start: 2024-04-05 | End: 2024-05-09

## 2024-04-05 RX ORDER — CARVEDILOL 6.25 MG/1
TABLET ORAL
Qty: 360 TABLET | Refills: 0 | Status: SHIPPED | OUTPATIENT
Start: 2024-04-05 | End: 2024-07-09

## 2024-04-05 NOTE — TELEPHONE ENCOUNTER
Medication refill information reviewed.     Due date for fentaNYL (DURAGESIC) 25 mcg/hr 72 hr patch and oxyCODONE (ROXICODONE) 5 MG/5ML solution is 04/27/24     Prescriptions prepped for review.     Will route to provider.

## 2024-04-05 NOTE — TELEPHONE ENCOUNTER
Received call from patient requesting refill(s) of fentaNYL (DURAGESIC) 25 mcg/hr 72 hr patch   oxyCODONE (ROXICODONE) 5 MG/5ML solution    Last dispensed from pharmacy on 3/25/2024  (per pharmacy both)     Patient's last office/virtual visit by prescribing provider on 3/4/2024.  Next office/virtual appointment scheduled for NONE.     Last urine drug screen date 11/1/2023.  Current opioid agreement on file (completed within the last year) Yes Date of opioid agreement: 11/1/2023.    E-[place] to:    [facility] [place] [address]    Will route to nursing pool for review and preparation of prescription(s).

## 2024-04-05 NOTE — LETTER
April 22, 2024      Parker LAL Curtis  [address]  [place] [address]        To Whom It May Concern,     Please continue to provide him with Adderall. He has previously tried ritalin and other formulations with no success.  He has a complex medical history and difficult to switch medications.           Sincerely,        Chuy Isaac MD

## 2024-04-05 NOTE — TELEPHONE ENCOUNTER
Signed Prescriptions:                        Disp   Refills    fentaNYL (DURAGESIC) 25 mcg/hr 72 hr patch 15 pat*0        Sig: Place 1 patch onto the skin every 48 hours Fill           04/25/24 and start 04/27/24. 30 day supply for           chronic pain.  Authorizing Provider: NATALIE MCDOWELL    oxyCODONE (ROXICODONE) 5 MG/5ML solution   1200 mL0        Sig: Take 5-10 mLs (5-10 mg) by mouth every 4 hours as           needed for moderate to severe pain Max of           40mg/day. Fill 04/25/24 and start 04/27/24. 30           day supply for chronic pain.  Authorizing Provider: NATALIE MCDOWELL        Reviewed [place]  April 5, 2024- no concerning fills.    Natalie Mcdowell APRN, RN CNP, FNP  [facility]  [facility]

## 2024-04-05 NOTE — TELEPHONE ENCOUNTER
PA needed for Brand Adderall 20mg per pt wanst brand Insurance: [employer] BS part D  Insur phone: [phone]  Patient ID: [id number]  Please let us know when PA is granted/denied. Thank you!  Sharon Fair, Pharmacy Tech, [facility] [place] [phone]

## 2024-04-10 ENCOUNTER — DOCUMENTATION ONLY (OUTPATIENT)
Dept: INFECTIOUS DISEASES | Facility: CLINIC | Age: 54
End: 2024-04-10
Payer: COMMERCIAL

## 2024-04-10 DIAGNOSIS — Z78.9 ON TOTAL PARENTERAL NUTRITION: Primary | ICD-10-CM

## 2024-04-10 DIAGNOSIS — E44.0 MODERATE PROTEIN-CALORIE MALNUTRITION (H): ICD-10-CM

## 2024-04-10 DIAGNOSIS — D50.8 IRON DEFICIENCY ANEMIA SECONDARY TO INADEQUATE DIETARY IRON INTAKE: ICD-10-CM

## 2024-04-10 DIAGNOSIS — R13.10 DYSPHAGIA: ICD-10-CM

## 2024-04-11 ENCOUNTER — MYC MEDICAL ADVICE (OUTPATIENT)
Dept: INTERNAL MEDICINE | Facility: CLINIC | Age: 54
End: 2024-04-11
Payer: COMMERCIAL

## 2024-04-16 ENCOUNTER — PATIENT OUTREACH (OUTPATIENT)
Dept: CARE COORDINATION | Facility: CLINIC | Age: 54
End: 2024-04-16
Payer: COMMERCIAL

## 2024-04-16 NOTE — LETTER
[facility] CARE COORDINATION  [address] [facility] [address]  Phone: [phone]      April 16, 2024      Parker Acevedo  [address] [facility] [address]    Dear Parker,    We have been trying to reach you to introduce you to [facility] s Care Coordination program.  The goal of care coordination is to help you manage your health and improve access to the [facility] system in the most efficient manner.  The Care Coordinator is a nurse who understands the healthcare system and will assist you in improving your access to care.     As your Physician and Care Coordinator we partner to help you achieve your health care goals.     We will continue to reach out; however, if you are able to call your Care Coordinator at [phone], that would be appreciated.  We at [facility] are focused on providing you with the highest-quality healthcare experience possible.      It is a pleasure to partner with you as we work towards achieving your optimal state of wellness.        Sincerely,    CHRISTY Abdul Paul D None   [address] [facility] [address]

## 2024-04-16 NOTE — PROGRESS NOTES
Clinic Care Coordination Contact  [facility]    Clinical Data: Care Coordinator Outreach    Outreach Documentation Number of Outreach Attempt   3/13/2024   2:29 PM 2   4/16/2024  11:03 AM 3       Unable to leave message.    Plan: Care Coordinator will send unable to contact letter with care coordinator contact information via [employer]. Care Coordinator will chart review in 1 month. If no return call, RN CC will send disenrollment letter.     Kinsey Muhammad RN Care Coordination   [facility] [place]  Email: [email or web address]  Phone: [phone]

## 2024-04-17 ENCOUNTER — LAB (OUTPATIENT)
Dept: INFUSION THERAPY | Facility: CLINIC | Age: 54
End: 2024-04-17
Attending: INTERNAL MEDICINE
Payer: COMMERCIAL

## 2024-04-17 DIAGNOSIS — E61.1 IRON DEFICIENCY: ICD-10-CM

## 2024-04-17 DIAGNOSIS — E46 MALNUTRITION, UNSPECIFIED TYPE (H): ICD-10-CM

## 2024-04-17 DIAGNOSIS — D50.8 IRON DEFICIENCY ANEMIA SECONDARY TO INADEQUATE DIETARY IRON INTAKE: Primary | ICD-10-CM

## 2024-04-17 DIAGNOSIS — E86.0 DEHYDRATION: ICD-10-CM

## 2024-04-17 LAB
ALBUMIN SERPL BCG-MCNC: 3.7 G/DL (ref 3.5–5.2)
ALP SERPL-CCNC: 88 U/L (ref 40–150)
ALT SERPL W P-5'-P-CCNC: 20 U/L (ref 0–70)
ANION GAP SERPL CALCULATED.3IONS-SCNC: 9 MMOL/L (ref 7–15)
AST SERPL W P-5'-P-CCNC: 18 U/L (ref 0–45)
BASOPHILS # BLD AUTO: 0.1 10E3/UL (ref 0–0.2)
BASOPHILS NFR BLD AUTO: 1 %
BILIRUB DIRECT SERPL-MCNC: <0.2 MG/DL (ref 0–0.3)
BILIRUB SERPL-MCNC: 0.2 MG/DL
BUN SERPL-MCNC: 12.8 MG/DL (ref 6–20)
CALCIUM SERPL-MCNC: 8.6 MG/DL (ref 8.6–10)
CHLORIDE SERPL-SCNC: 109 MMOL/L (ref 98–107)
CREAT SERPL-MCNC: 0.71 MG/DL (ref 0.67–1.17)
DEPRECATED HCO3 PLAS-SCNC: 26 MMOL/L (ref 22–29)
EGFRCR SERPLBLD CKD-EPI 2021: >90 ML/MIN/1.73M2
EOSINOPHIL # BLD AUTO: 0.2 10E3/UL (ref 0–0.7)
EOSINOPHIL NFR BLD AUTO: 3 %
ERYTHROCYTE [DISTWIDTH] IN BLOOD BY AUTOMATED COUNT: 19.6 % (ref 10–15)
FASTING STATUS PATIENT QL REPORTED: YES
GLUCOSE SERPL-MCNC: 91 MG/DL (ref 70–99)
HCT VFR BLD AUTO: 40.1 % (ref 40–53)
HGB BLD-MCNC: 12.8 G/DL (ref 13.3–17.7)
IMM GRANULOCYTES # BLD: 0 10E3/UL
IMM GRANULOCYTES NFR BLD: 0 %
LYMPHOCYTES # BLD AUTO: 1.5 10E3/UL (ref 0.8–5.3)
LYMPHOCYTES NFR BLD AUTO: 25 %
MAGNESIUM SERPL-MCNC: 2 MG/DL (ref 1.7–2.3)
MCH RBC QN AUTO: 28.1 PG (ref 26.5–33)
MCHC RBC AUTO-ENTMCNC: 31.9 G/DL (ref 31.5–36.5)
MCV RBC AUTO: 88 FL (ref 78–100)
MONOCYTES # BLD AUTO: 0.6 10E3/UL (ref 0–1.3)
MONOCYTES NFR BLD AUTO: 10 %
NEUTROPHILS # BLD AUTO: 3.7 10E3/UL (ref 1.6–8.3)
NEUTROPHILS NFR BLD AUTO: 61 %
NRBC # BLD AUTO: 0 10E3/UL
NRBC BLD AUTO-RTO: 0 /100
PHOSPHATE SERPL-MCNC: 3 MG/DL (ref 2.5–4.5)
PLATELET # BLD AUTO: 205 10E3/UL (ref 150–450)
POTASSIUM SERPL-SCNC: 4 MMOL/L (ref 3.4–5.3)
PROT SERPL-MCNC: 6.6 G/DL (ref 6.4–8.3)
RBC # BLD AUTO: 4.55 10E6/UL (ref 4.4–5.9)
SODIUM SERPL-SCNC: 144 MMOL/L (ref 135–145)
TRIGL SERPL-MCNC: 93 MG/DL
WBC # BLD AUTO: 6.1 10E3/UL (ref 4–11)

## 2024-04-17 PROCEDURE — 85025 COMPLETE CBC W/AUTO DIFF WBC: CPT

## 2024-04-17 PROCEDURE — 84478 ASSAY OF TRIGLYCERIDES: CPT | Performed by: SURGERY

## 2024-04-17 PROCEDURE — 84100 ASSAY OF PHOSPHORUS: CPT | Performed by: SURGERY

## 2024-04-17 PROCEDURE — 250N000011 HC RX IP 250 OP 636: Performed by: INTERNAL MEDICINE

## 2024-04-17 PROCEDURE — 82248 BILIRUBIN DIRECT: CPT | Performed by: SURGERY

## 2024-04-17 PROCEDURE — 36592 COLLECT BLOOD FROM PICC: CPT

## 2024-04-17 PROCEDURE — 83735 ASSAY OF MAGNESIUM: CPT | Performed by: SURGERY

## 2024-04-17 PROCEDURE — 80053 COMPREHEN METABOLIC PANEL: CPT | Performed by: SURGERY

## 2024-04-17 RX ORDER — HEPARIN SODIUM,PORCINE 10 UNIT/ML
5-20 VIAL (ML) INTRAVENOUS DAILY PRN
Status: CANCELLED | OUTPATIENT
Start: 2024-04-17

## 2024-04-17 RX ORDER — HEPARIN SODIUM,PORCINE 10 UNIT/ML
5-20 VIAL (ML) INTRAVENOUS DAILY PRN
Status: DISCONTINUED | OUTPATIENT
Start: 2024-04-17 | End: 2024-04-17 | Stop reason: HOSPADM

## 2024-04-17 RX ADMIN — HEPARIN, PORCINE (PF) 10 UNIT/ML INTRAVENOUS SYRINGE 5 ML: at 13:34

## 2024-04-17 NOTE — PROGRESS NOTES
Infusion Nursing Note:  Parker Acevedo presents today for lab draw from [facility].    Patient seen by provider today: No   present during visit today: Not Applicable.    Note: Pt here stating that Home Care RN unable to draw from central line.  Labs drawn using CL with out difficulty.       Intravenous Access:  Cm.    Treatment Conditions:  Not Applicable.      Post Infusion Assessment:  Patient tolerated lab draw without incident.  Blood return noted pre and post infusion.  Site patent and intact, free from redness, edema or discomfort.  No evidence of extravasations.       Discharge Plan:   Patient discharged in stable condition accompanied by: wife.  Departure Mode: Ambulatory.      Yaa Daniels RN

## 2024-04-18 DIAGNOSIS — Z53.9 DIAGNOSIS NOT YET DEFINED: Primary | ICD-10-CM

## 2024-04-18 NOTE — TELEPHONE ENCOUNTER
Retail Pharmacy Prior Authorization Team   Phone: [phone]    PA Initiation    Medication: ADDERALL 20 MG PO TABS  Insurance Company: [employer] - Phone [phone] Fax [phone]  Pharmacy Filling the Rx: [facility] [place] - [address] [facility]  Filling Pharmacy Phone: [phone]  Filling Pharmacy Fax:    Start Date: 4/18/2024      Note: Due to record-high volumes, our turn-around time is taking longer than usual . We are currently 10 business days behind in the pools.   We are working diligently to submit all requests in a timely manner and in the order they are received. Please only flag TRUE URGENT requests as high priority to the pool at this time.   If you have questions - please send a note/message in the active PA encounter and send back to the [facility] PA pool [[id number]].    If you have more specific questions about our process please reach out to our supervisor Nicolette Gutierrez.   Thank you!   RPPA (Retail Pharmacy Prior Authorization) team

## 2024-04-19 RX ORDER — METHYLPHENIDATE HYDROCHLORIDE 10 MG/1
10 TABLET ORAL 2 TIMES DAILY
Qty: 60 TABLET | Refills: 0 | Status: SHIPPED | OUTPATIENT
Start: 2024-04-19 | End: 2024-06-03

## 2024-04-19 NOTE — TELEPHONE ENCOUNTER
Retail Pharmacy Prior Authorization Team   Phone: [phone]    PA DENIED  If the provider would like to appeal we will need a detailed   letter of medical necessity with clinical reasoning to start the process.   Please close the encounter when finished.   Thank you,  Annel Vera CPhT    PA Team

## 2024-04-19 NOTE — TELEPHONE ENCOUNTER
Retail Pharmacy Prior Authorization Team   Phone: [phone]    PRIOR AUTHORIZATION DENIED    Medication: ADDERALL 20 MG PO TABS  Insurance Company: [employer] - Phone [phone] Fax [phone]  Denial Date: 4/18/2024  Denial Reason(s): MUST TRY AND FAIL ONE ADDITIONAL DRUG FOR YOUR CONDITION: DEXMETHYLPHENIDATE IR TAB (GENERIC FOCALIN), DEXTROAMPHETAMINE IR TAB,  METHYLPHENIDATE IR (GENERIC RITALIN), OR METHYLPHENIDATE SOLUTION (GENERIC METHYLIN)      Appeal Information:

## 2024-04-19 NOTE — TELEPHONE ENCOUNTER
Spoke with patient wife (Rose/ C2C on file) she states that the patient has tried all of the other medications and failed on them, that he can only take BRAND ADDERALL and they are asking if you can do the appeal letter.  This has worked in the past.    Kristin Jaimes XRO/

## 2024-04-19 NOTE — TELEPHONE ENCOUNTER
Please let him know that Adderall is not approved by his insurance.  We did change him to Ritalin 10 mg twice a day to treat this and a prescription has been sent.

## 2024-04-21 ENCOUNTER — TELEPHONE (OUTPATIENT)
Dept: INTERNAL MEDICINE | Facility: CLINIC | Age: 54
End: 2024-04-21
Payer: COMMERCIAL

## 2024-04-21 NOTE — PATIENT INSTRUCTIONS
----------------------------------------------------------------  Clinic Number:  [phone]     Call with any questions about your care and for scheduling assistance.     Calls are returned Monday through Friday between 8 AM and 4:30 PM. We usually get back to you within 2 business days depending on the issue/request.    If we are prescribing your medications:    For opioid medication refills, call the clinic or send a Specialists On Callt message 7 days in advance.  Please include:    Name of requested medication    Name of the pharmacy.    For non-opioid medications, call your pharmacy directly to request a refill. Please allow 3-4 days to be processed.     Per [place] State Law:    All controlled substance prescriptions must be filled within 30 days of being written.      For those controlled substances allowing refills, pickup must occur within 30 days of last fill.      We believe regular attendance is key to your success in our program!      Any time you are unable to keep your appointment we ask that you call us at least 24 hours in advance to cancel.This will allow us to offer the appointment time to another patient.     Multiple missed appointments may lead to dismissal from the clinic.        ----------------------------------------------------------------  Clinic Number:  [phone]     Call with any questions about your care and for scheduling assistance.     Calls are returned Monday through Friday between 8 AM and 4:30 PM. We usually get back to you within 2 business days depending on the issue/request.    If we are prescribing your medications:    For opioid medication refills, call the clinic or send a Specialists On Callt message 7 days in advance.  Please include:    Name of requested medication    Name of the pharmacy.    For non-opioid medications, call your pharmacy directly to request a refill. Please allow 3-4 days to be processed.     Per [place] State Law:    All controlled substance prescriptions must be filled  within 30 days of being written.      For those controlled substances allowing refills, pickup must occur within 30 days of last fill.      We believe regular attendance is key to your success in our program!      Any time you are unable to keep your appointment we ask that you call us at least 24 hours in advance to cancel.This will allow us to offer the appointment time to another patient.     Multiple missed appointments may lead to dismissal from the clinic.       125

## 2024-04-21 NOTE — TELEPHONE ENCOUNTER
Retail Pharmacy Prior Authorization Team   Phone: [phone]      [facility] [place] PA PORTAL APPROVAL: ONCE THE APPROVAL IS OBTAINED, THE RX ORDER IS THEN RELEASED TO THE FILLING PHARMACY THAT HAS BEEN ATTACHED TO THE RX ORDER.  ADDITIONAL FOLLOW UP CALLS ARE NOT MADE ON EPA APPROVALS.

## 2024-04-23 NOTE — TELEPHONE ENCOUNTER
Retail Pharmacy Prior Authorization Team   Phone: [phone]    Medication Appeal Initiation    Medication: ADDERALL 20 MG PO TABS  Appeal Start Date: 04/23/2024  Insurance Company: [employer]    Insurance Phone: [phone]   Insurance Fax: [phone]   Comments:

## 2024-04-29 ENCOUNTER — MYC REFILL (OUTPATIENT)
Dept: INTERNAL MEDICINE | Facility: CLINIC | Age: 54
End: 2024-04-29
Payer: COMMERCIAL

## 2024-04-29 DIAGNOSIS — Z98.84 GASTRIC BYPASS STATUS FOR OBESITY: ICD-10-CM

## 2024-04-29 DIAGNOSIS — F41.9 CHRONIC ANXIETY: ICD-10-CM

## 2024-04-29 DIAGNOSIS — F90.0 ADHD (ATTENTION DEFICIT HYPERACTIVITY DISORDER), INATTENTIVE TYPE: ICD-10-CM

## 2024-04-30 ENCOUNTER — MYC MEDICAL ADVICE (OUTPATIENT)
Dept: INTERNAL MEDICINE | Facility: CLINIC | Age: 54
End: 2024-04-30
Payer: COMMERCIAL

## 2024-05-02 NOTE — TELEPHONE ENCOUNTER
Retail Pharmacy Prior Authorization Team   Phone: [phone]    Received requested approval paperwork via faxed instead of mail as previously sent.

## 2024-05-02 NOTE — TELEPHONE ENCOUNTER
Retail Pharmacy Prior Authorization Team   Phone: [phone]    MEDICATION APPEAL APPROVED    Medication: ADDERALL 20 MG PO TABS  Authorization Effective Date:  05/01/2024  Authorization Expiration Date:  01/20/2025  Appeal Insurance Company: [employer] / ?????.?? - Phone [phone] Fax [phone]   Filling Pharmacy: [address]  Patient Notified: YES  Comments:         **PAPERWORK SENT VIA MAIL**   Appeal approved

## 2024-05-03 RX ORDER — SUCRALFATE ORAL 1 G/10ML
1 SUSPENSION ORAL 4 TIMES DAILY
Qty: 414 ML | Refills: 3 | Status: SHIPPED | OUTPATIENT
Start: 2024-05-03 | End: 2024-06-15

## 2024-05-03 RX ORDER — DEXTROAMPHETAMINE SACCHARATE, AMPHETAMINE ASPARTATE, DEXTROAMPHETAMINE SULFATE AND AMPHETAMINE SULFATE 5; 5; 5; 5 MG/1; MG/1; MG/1; MG/1
TABLET ORAL
Qty: 30 TABLET | Refills: 0 | Status: SHIPPED | OUTPATIENT
Start: 2024-05-03 | End: 2024-06-03

## 2024-05-03 RX ORDER — ALPRAZOLAM 0.5 MG
TABLET ORAL
Qty: 60 TABLET | Refills: 0 | Status: SHIPPED | OUTPATIENT
Start: 2024-05-03 | End: 2024-06-03

## 2024-05-09 ENCOUNTER — MYC REFILL (OUTPATIENT)
Dept: INTERNAL MEDICINE | Facility: CLINIC | Age: 54
End: 2024-05-09
Payer: COMMERCIAL

## 2024-05-09 ENCOUNTER — MYC REFILL (OUTPATIENT)
Dept: PALLIATIVE MEDICINE | Facility: CLINIC | Age: 54
End: 2024-05-09
Payer: COMMERCIAL

## 2024-05-09 ENCOUNTER — MEDICAL CORRESPONDENCE (OUTPATIENT)
Dept: HEALTH INFORMATION MANAGEMENT | Facility: CLINIC | Age: 54
End: 2024-05-09
Payer: COMMERCIAL

## 2024-05-09 DIAGNOSIS — R19.8 VISCERAL HYPERALGESIA: ICD-10-CM

## 2024-05-09 DIAGNOSIS — F11.90 CHRONIC, CONTINUOUS USE OF OPIOIDS: ICD-10-CM

## 2024-05-09 DIAGNOSIS — M54.50 CHRONIC BILATERAL LOW BACK PAIN WITHOUT SCIATICA: ICD-10-CM

## 2024-05-09 DIAGNOSIS — G89.29 CHRONIC ABDOMINAL PAIN: ICD-10-CM

## 2024-05-09 DIAGNOSIS — R10.9 CHRONIC ABDOMINAL PAIN: ICD-10-CM

## 2024-05-09 DIAGNOSIS — R11.0 NAUSEA: ICD-10-CM

## 2024-05-09 DIAGNOSIS — G89.29 CHRONIC BILATERAL LOW BACK PAIN WITHOUT SCIATICA: ICD-10-CM

## 2024-05-09 DIAGNOSIS — G89.4 CHRONIC PAIN SYNDROME: ICD-10-CM

## 2024-05-09 DIAGNOSIS — E55.9 VITAMIN D DEFICIENCY: ICD-10-CM

## 2024-05-10 RX ORDER — ERGOCALCIFEROL 1.25 MG/1
50000 CAPSULE, LIQUID FILLED ORAL WEEKLY
Qty: 12 CAPSULE | Refills: 3 | Status: SHIPPED | OUTPATIENT
Start: 2024-05-10

## 2024-05-10 RX ORDER — OXYCODONE HCL 5 MG/5 ML
5-10 SOLUTION, ORAL ORAL EVERY 4 HOURS PRN
Qty: 1200 ML | Refills: 0 | Status: SHIPPED | OUTPATIENT
Start: 2024-05-24 | End: 2024-06-05

## 2024-05-10 RX ORDER — ONDANSETRON 8 MG/1
TABLET, ORALLY DISINTEGRATING ORAL
Qty: 90 TABLET | Refills: 1 | Status: SHIPPED | OUTPATIENT
Start: 2024-05-10 | End: 2024-07-23

## 2024-05-10 RX ORDER — FENTANYL 25 UG/1
1 PATCH TRANSDERMAL
Qty: 15 PATCH | Refills: 0 | Status: SHIPPED | OUTPATIENT
Start: 2024-05-25 | End: 2024-06-05

## 2024-05-10 NOTE — TELEPHONE ENCOUNTER
Will route to [place] pool for assistance with gathering opioid refill information.    Perla NJ RN Care Coordinator  [facility]      Parker FRIAS Pain Nurse16 hours ago (4:46 PM)       Refills have been requested for the following medications:         fentaNYL (DURAGESIC) 25 mcg/hr 72 hr patch [Natalie Hull]         oxyCODONE (ROXICODONE) 5 MG/5ML solution [Natalie Hull]     Preferred pharmacy: [facility] [place], [place] - [address] [place] [address]

## 2024-05-10 NOTE — TELEPHONE ENCOUNTER
Routing to provider to review medication prepped per below    fentaNYL (DURAGESIC) 25 mcg/hr 72 hr patch , #15, Refill:0  Sig:Place 1 patch onto the skin every 48 hours Fill 04/25/24 and start 04/27/24. 30 day supply for chronic pain. - Transdermal   Last picked up 4/25/24 with start on 4/27/24  Due: OK to fill 5/25/24 with start 5/27/24      oxyCODONE (ROXICODONE) 5 MG/5ML solution , #1200ml, Refill:0  Sig:Take 5-10 mLs (5-10 mg) by mouth every 4 hours as needed for moderate to severe pain Max of 40mg/day.  30 day supply for chronic pain. - Oral   Last picked up 4/25/24 with start on 4/27/24  Due: OK to fill 5/25/24 with start 5/27/24    Per last OV note 3/4/24: Continue fentanyl patch 25mcg/hr every 48 hours, fill 11/27 and start 11/29  Oxycodone liquid 5mg/5ml,  use 5-10mg (5-10mls) every 4 hours as needed, max of 40mg (40ml) per day. Fill 11/27 and start 11/29  START Lyrica 25mg as directed.       [facility] [place], [place] - [address]  [address] [address]  Phone: [phone] Fax: [phone]    Perla NJ RN Care Coordinator  [facility]

## 2024-05-10 NOTE — TELEPHONE ENCOUNTER
Signed Prescriptions:                        Disp   Refills    fentaNYL (DURAGESIC) 25 mcg/hr 72 hr patch 15 pat*0        Sig: Place 1 patch onto the skin every 48 hours OK to fill           5/25/24 with start 5/27/24. 30 day supply for           chronic pain.  Authorizing Provider: NATALIE MCDOWELL    oxyCODONE (ROXICODONE) 5 MG/5ML solution   1200 mL0        Sig: Take 5-10 mLs (5-10 mg) by mouth every 4 hours as           needed for moderate to severe pain Max of           40mg/day. OK to fill 5/25/24 with start 5/27/24.           30 day supply for chronic pain.  Authorizing Provider: NATALIE MCDOWELL        Reviewed [place]  May 10, 2024- no concerning fills.    Natalie MENARD, RN CNP, FNP  [facility]  [facility]

## 2024-05-10 NOTE — TELEPHONE ENCOUNTER
Received call from patient requesting refill(s) fentaNYL (DURAGESIC) 25 mcg/hr 72 hr patch,  Last dispensed from pharmacy on 04/25/2024       oxyCODONE (ROXICODONE) 5 MG/5ML solution  Last dispensed from pharmacy on 04/25/2024    Patient's last office/virtual visit by prescribing provider on 03/04/2024  Next office/virtual appointment scheduled for 06/11/2024    Last urine drug screen date 11/01/2023  Current opioid agreement on file (completed within the last year) Yes Date of opioid agreement: 11/01/2023    E-prescribe to      [facility] [place] [address]    Will route to nursing pool for review and preparation of prescription(s).     Anastasiia Scott MA  [facility]

## 2024-05-14 ENCOUNTER — TELEPHONE (OUTPATIENT)
Dept: INTERNAL MEDICINE | Facility: CLINIC | Age: 54
End: 2024-05-14
Payer: COMMERCIAL

## 2024-05-16 DIAGNOSIS — R13.10 DYSPHAGIA: ICD-10-CM

## 2024-05-20 ENCOUNTER — MYC MEDICAL ADVICE (OUTPATIENT)
Dept: INTERNAL MEDICINE | Facility: CLINIC | Age: 54
End: 2024-05-20
Payer: COMMERCIAL

## 2024-05-21 ENCOUNTER — LAB (OUTPATIENT)
Dept: INFUSION THERAPY | Facility: CLINIC | Age: 54
End: 2024-05-21
Attending: INTERNAL MEDICINE
Payer: COMMERCIAL

## 2024-05-21 DIAGNOSIS — D50.8 IRON DEFICIENCY ANEMIA SECONDARY TO INADEQUATE DIETARY IRON INTAKE: Primary | ICD-10-CM

## 2024-05-21 DIAGNOSIS — E86.0 DEHYDRATION: ICD-10-CM

## 2024-05-21 DIAGNOSIS — R13.10 DYSPHAGIA: ICD-10-CM

## 2024-05-21 DIAGNOSIS — E46 MALNUTRITION, UNSPECIFIED TYPE (H): ICD-10-CM

## 2024-05-21 DIAGNOSIS — E61.1 IRON DEFICIENCY: ICD-10-CM

## 2024-05-21 RX ORDER — HEPARIN SODIUM,PORCINE 10 UNIT/ML
5-20 VIAL (ML) INTRAVENOUS DAILY PRN
Status: DISCONTINUED | OUTPATIENT
Start: 2024-05-21 | End: 2024-05-21 | Stop reason: HOSPADM

## 2024-05-21 RX ORDER — HEPARIN SODIUM,PORCINE 10 UNIT/ML
5-20 VIAL (ML) INTRAVENOUS DAILY PRN
Status: CANCELLED | OUTPATIENT
Start: 2024-05-21

## 2024-05-21 NOTE — PROGRESS NOTES
Infusion Nursing Note:  Parker Acevedo presents today for dressing change and labs.    Patient seen by provider today: No   present during visit today: Not Applicable.    Note: Patient is here today with a friend for dressing change and for labs.  Labs were showing that they may not be covered by insurance.  Patient does not want to have to pay for them and declined signing the waver that was required to draw the labs today. Patient states that his has not been a problem in the past with homecare.   Provider Francis Vyas was paged with no response.  Message sent to provider through Epic to follow up with the patient as to how he would like to proceed with the labs.     Provider did call back after the patient had already left and was notified that the patient did not have any labs drawn today due to insurance and possible coverage issues.       Intravenous Access:  PICC.    Treatment Conditions:  Not Applicable.    Discharge Plan:   Patient discharged in stable condition accompanied by: self and friend.  Departure Mode: Ambulatory.  Patient does not have any future appointments with infusion at this time. .      Mia Witt RN

## 2024-05-23 DIAGNOSIS — R11.15 CYCLICAL VOMITING SYNDROME NOT ASSOCIATED WITH MIGRAINE: ICD-10-CM

## 2024-05-23 DIAGNOSIS — E86.0 DEHYDRATION: ICD-10-CM

## 2024-05-23 DIAGNOSIS — K31.1 GASTRIC OUTLET OBSTRUCTION: ICD-10-CM

## 2024-05-23 DIAGNOSIS — K27.9 PEPTIC ULCER DISEASE: ICD-10-CM

## 2024-05-23 DIAGNOSIS — E43 SEVERE PROTEIN-CALORIE MALNUTRITION (H): ICD-10-CM

## 2024-05-23 DIAGNOSIS — K90.9 INTESTINAL MALABSORPTION: ICD-10-CM

## 2024-05-23 DIAGNOSIS — R13.10 DYSPHAGIA: ICD-10-CM

## 2024-05-23 DIAGNOSIS — Z78.9 ON TOTAL PARENTERAL NUTRITION (TPN): Primary | ICD-10-CM

## 2024-05-23 DIAGNOSIS — K90.829 SHORT GUT SYNDROME: ICD-10-CM

## 2024-05-23 DIAGNOSIS — D50.8 OTHER IRON DEFICIENCY ANEMIA: ICD-10-CM

## 2024-05-24 ENCOUNTER — TELEPHONE (OUTPATIENT)
Dept: PALLIATIVE MEDICINE | Facility: CLINIC | Age: 54
End: 2024-05-24
Payer: COMMERCIAL

## 2024-05-24 NOTE — TELEPHONE ENCOUNTER
"Will route to provider to review for ok to fill today 05/24/24    \"OK to fill 5/25/24 with start 5/27/24. 30 day supply for chronic pain.\"      Jael KHAN, RN Care Coordinator  [facility]  Pain Management      "

## 2024-05-24 NOTE — TELEPHONE ENCOUNTER
Patient's wife is here.  Rxs say to ok to fill 5/25/24 - Just need verbal ok to fill 5/24 (today) because we are closed tomorrow , Sunday and Monday(holiday).       Please call us at our direct phone number [phone] if ok to fill today.      Thank you   -Fany John, Pharm.D., [facility], [phone]

## 2024-05-24 NOTE — TELEPHONE ENCOUNTER
[facility] Call Center    Phone Message    May a detailed message be left on voicemail: yes     Reason for Call: Other: Patient is needing his fentanyl changed to  date as today since the pharmacy is closed over the weekend and on Monday. He is at the pharmacy waiting. Please call back when available.      Action Taken: Other: Pain    Travel Screening: Not Applicable

## 2024-05-24 NOTE — TELEPHONE ENCOUNTER
The medication that is ordered yesterday, date for pick need to be change.     fentaNYL (DURAGESIC) 25 mcg/hr 72 hr patch OK to fill 5/25/24 with start 5/27/24. 30 day supply for chronic pain.     5/25/24   need to say 5/4/24  to  to start on 5/27/24    The   [facility] [place] [address] [place] [address]   Is close on 5/25/24 and 5/27/24  needs to be  today 5/24/24     And also     oxyCODONE (ROXICODONE) 5 MG/5ML solution     OK to fill 5/25/24 with start 5/27/24. 30 day supply for chronic pain.      But the pharmacy is closed 5/25/24  and 2/27/24    Would like to change to fill 5/24/24 pick to start on 5/27/24       Thanks     Natalie

## 2024-05-24 NOTE — TELEPHONE ENCOUNTER
Called to Ok fill today 5/24/2024.   Spoke with pharmacist.     Natalie MENARD, RN CNP, FNP  [facility]

## 2024-05-28 NOTE — PLAN OF CARE
"/75 (BP Location: Right arm, Patient Position: Sitting, Cuff Size: Adult Regular)   Pulse 83   Temp 98.3  F (36.8  C) (Oral)   Resp 16   Ht 1.803 m (5' 10.98\")   Wt 86.6 kg (191 lb)   SpO2 100%   BMI 26.65 kg/m      Patient alert and oriented. Did no sleep this shift. Pain managed with PRN Oxycodone and Xanax with effective results. Patient Intermittently stop continuous PPN run stating he has to go out to smoke and don't have the time to sit here the whole time it run. Risk to benefits explained Patient  still refuse. Patient has been going out of building to smoke. No behaviors noted this shift. No acute distress noted this shift. Continue with plan of care.  "
"Goal Outcome Evaluation:      Plan of Care Reviewed With: patient    /81 (BP Location: Right arm)   Pulse 85   Temp 98  F (36.7  C) (Oral)   Resp 16   Ht 1.803 m (5' 10.98\")   Wt 86.6 kg (191 lb)   SpO2 99%   BMI 26.65 kg/m       Overall Patient Progress: improving    Outcome Evaluation: pt alert and oriented x4 and able to make needs known. Pt is independent in his room and can ambulate without assistance and goes outside often. Pt refusing ppn administration today. pt anxious to get home but is willing to listen to medical advice and stay. pt was given a suppository to administer himself but has yet to do so. pt is vitally stable and expriencing intermittent pain releived by PRN oxycodne and tylenol.      "
"Goal Outcome Evaluation:  /87 (BP Location: Left arm)   Pulse 80   Temp 98.6  F (37  C) (Oral)   Resp 16   Ht 1.803 m (5' 10.98\")   Wt 86.6 kg (191 lb)   SpO2 100%   BMI 26.65 kg/m        Plan of Care Reviewed With: patient  Overall Patient Progress: improvingOverall Patient Progress: improving  Outcome Evaluation: pt is alert and oriented x4, able to make needs known, on room air. PPN infusing via new PIV Right arm, pt amb ad vitor with steady gait, goes outside often for smokes. MD offered nicotine replacement, pt declined. Pt refused enema, pt stated he would self administer suppository.  Site of azevedo removal new dressing applied.   Pt up at nursing station desk requesting to be unhooked from PPN. Disposition tomorrow or Friday pending set up with [facility].   Continue plan of care   "
Goal Outcome Evaluation:      Plan of Care Reviewed With: patient    Overall Patient Progress: improvingOverall Patient Progress: improving           
Goal Outcome Evaluation:      Plan of Care Reviewed With: patient, significant other    Overall Patient Progress: improving    Outcome Evaluation: Pt pain well managed with oral oxycodone. Pt fever has gone done with tylenol and pt is compliant with antibiotic plan.    VS:       Pt A/O X 4. Afebrile. VSS./83 (BP Location: Left arm)   Pulse 102   Temp (!) 102.2  F (39  C) (Oral)   Resp 18   SpO2 97%   Lungs- clear bilaterally with both anterior and posterior. IS encouraged. Denies nausea, shortness of breath, and chest pain.     Output:       Bowels- active in all four quadrants. Voids spontaneously without difficulty in the bathroom.      Activity:       Pt up independently.      Skin:   Skin intact.     Pain:       Has pain in the abdomin/back and given oxycodone, tylenol, and is tolerating well.      CMS:       CMS and Neuro's are intact. Denies numbness and tingling in all extremities.      Dressing:       No incisional dressing.     Diet:       Pt is on a NPO diet except medications.      LDA:       PIV is patent in the L forearm and SL.   CVC double lumen is patent in the L internal jugular.   G tube     Equipment:       Pt belongings in the room. Pt denies PCD's on BLE's. Bilateral heels are elevated off the bed.     Plan:       Pt is able to make needs known and the call light is within the pt's reach. Continue to monitor.       Additional Info:              
Pt endorsed bilateral fingers numbness

## 2024-05-29 ENCOUNTER — PATIENT OUTREACH (OUTPATIENT)
Dept: CARE COORDINATION | Facility: CLINIC | Age: 54
End: 2024-05-29
Payer: COMMERCIAL

## 2024-05-29 NOTE — TELEPHONE ENCOUNTER
Need to change pharmacy through My Chart request  Denied this request to be able to refill to another pharmacy.    Ella Hammond RN    
no

## 2024-05-29 NOTE — LETTER
[facility] CARE COORDINATION  [address] [address]    May 29, 2024    Parker Acevedo  [address] [address] [address]      Dear Parker,    I have been unsuccessful in reaching you since our last contact. At this time the Care Coordination team will make no further attempts to reach you, however this does not change your ability to continue receiving care from your providers at your primary care clinic. If you need additional support from a care coordinator in the future please contact Kinsey LAL at [phone].    All of us at [facility] are invested in your health and are here to assist you in meeting your goals.     Sincerely,    Kinsey Muhammad RN Care Coordination   [facility]  [place], [place], [place]  Phone: [phone]

## 2024-05-29 NOTE — PROGRESS NOTES
Clinic Care Coordination Contact  [facility]    Clinical Data: Care Coordinator Outreach    Outreach Documentation Number of Outreach Attempt   4/16/2024  11:03 AM 3       Patient did not return call after 3 attempts.     Plan: Care Coordinator will send disenrollment letter with care coordinator contact information via [employer]. Care Coordinator will do no further outreaches at this time.    Kinsey Muhammad RN Care Coordination   [facility]  Email: [email or web address]  Phone: [phone]

## 2024-05-31 ENCOUNTER — LAB (OUTPATIENT)
Dept: INFUSION THERAPY | Facility: CLINIC | Age: 54
End: 2024-05-31
Attending: INTERNAL MEDICINE
Payer: COMMERCIAL

## 2024-05-31 DIAGNOSIS — R13.10 DYSPHAGIA: ICD-10-CM

## 2024-05-31 DIAGNOSIS — E46 MALNUTRITION, UNSPECIFIED TYPE (H): ICD-10-CM

## 2024-05-31 DIAGNOSIS — K31.1 GASTRIC OUTLET OBSTRUCTION: ICD-10-CM

## 2024-05-31 DIAGNOSIS — Z78.9 ON TOTAL PARENTERAL NUTRITION (TPN): ICD-10-CM

## 2024-05-31 DIAGNOSIS — D50.8 IRON DEFICIENCY ANEMIA SECONDARY TO INADEQUATE DIETARY IRON INTAKE: Primary | ICD-10-CM

## 2024-05-31 DIAGNOSIS — E43 SEVERE PROTEIN-CALORIE MALNUTRITION (H): ICD-10-CM

## 2024-05-31 DIAGNOSIS — D50.8 OTHER IRON DEFICIENCY ANEMIA: ICD-10-CM

## 2024-05-31 DIAGNOSIS — E86.0 DEHYDRATION: ICD-10-CM

## 2024-05-31 DIAGNOSIS — K90.9 INTESTINAL MALABSORPTION: ICD-10-CM

## 2024-05-31 DIAGNOSIS — K90.829 SHORT GUT SYNDROME: ICD-10-CM

## 2024-05-31 DIAGNOSIS — R11.15 CYCLICAL VOMITING SYNDROME NOT ASSOCIATED WITH MIGRAINE: ICD-10-CM

## 2024-05-31 DIAGNOSIS — K27.9 PEPTIC ULCER DISEASE: ICD-10-CM

## 2024-05-31 DIAGNOSIS — E61.1 IRON DEFICIENCY: ICD-10-CM

## 2024-05-31 LAB
ALBUMIN SERPL BCG-MCNC: 3.7 G/DL (ref 3.5–5.2)
ALP SERPL-CCNC: 88 U/L (ref 40–150)
ALT SERPL W P-5'-P-CCNC: 20 U/L (ref 0–70)
ANION GAP SERPL CALCULATED.3IONS-SCNC: 8 MMOL/L (ref 7–15)
AST SERPL W P-5'-P-CCNC: 20 U/L (ref 0–45)
BASOPHILS # BLD AUTO: 0 10E3/UL (ref 0–0.2)
BASOPHILS NFR BLD AUTO: 1 %
BILIRUB DIRECT SERPL-MCNC: <0.2 MG/DL (ref 0–0.3)
BILIRUB SERPL-MCNC: 0.4 MG/DL
BILIRUB SERPL-MCNC: 0.4 MG/DL
BUN SERPL-MCNC: 12.8 MG/DL (ref 6–20)
CALCIUM SERPL-MCNC: 8.5 MG/DL (ref 8.6–10)
CHLORIDE SERPL-SCNC: 105 MMOL/L (ref 98–107)
CREAT SERPL-MCNC: 0.75 MG/DL (ref 0.67–1.17)
CRP SERPL-MCNC: 10.78 MG/L
DEPRECATED HCO3 PLAS-SCNC: 28 MMOL/L (ref 22–29)
EGFRCR SERPLBLD CKD-EPI 2021: >90 ML/MIN/1.73M2
EOSINOPHIL # BLD AUTO: 0.2 10E3/UL (ref 0–0.7)
EOSINOPHIL NFR BLD AUTO: 3 %
ERYTHROCYTE [DISTWIDTH] IN BLOOD BY AUTOMATED COUNT: 15.5 % (ref 10–15)
FASTING STATUS PATIENT QL REPORTED: YES
FERRITIN SERPL-MCNC: 40 NG/ML (ref 31–409)
GLUCOSE SERPL-MCNC: 167 MG/DL (ref 70–99)
HCT VFR BLD AUTO: 34.5 % (ref 40–53)
HGB BLD-MCNC: 11 G/DL (ref 13.3–17.7)
HOLD SPECIMEN: NORMAL
IMM GRANULOCYTES # BLD: 0 10E3/UL
IMM GRANULOCYTES NFR BLD: 0 %
LYMPHOCYTES # BLD AUTO: 2.1 10E3/UL (ref 0.8–5.3)
LYMPHOCYTES NFR BLD AUTO: 34 %
MAGNESIUM SERPL-MCNC: 1.9 MG/DL (ref 1.7–2.3)
MCH RBC QN AUTO: 29.7 PG (ref 26.5–33)
MCHC RBC AUTO-ENTMCNC: 31.9 G/DL (ref 31.5–36.5)
MCV RBC AUTO: 93 FL (ref 78–100)
MONOCYTES # BLD AUTO: 0.8 10E3/UL (ref 0–1.3)
MONOCYTES NFR BLD AUTO: 12 %
NEUTROPHILS # BLD AUTO: 3.2 10E3/UL (ref 1.6–8.3)
NEUTROPHILS NFR BLD AUTO: 50 %
NRBC # BLD AUTO: 0 10E3/UL
NRBC BLD AUTO-RTO: 0 /100
PHOSPHATE SERPL-MCNC: 3.3 MG/DL (ref 2.5–4.5)
PLATELET # BLD AUTO: 153 10E3/UL (ref 150–450)
POTASSIUM SERPL-SCNC: 3.7 MMOL/L (ref 3.4–5.3)
PROT SERPL-MCNC: 6.6 G/DL (ref 6.4–8.3)
RBC # BLD AUTO: 3.7 10E6/UL (ref 4.4–5.9)
SODIUM SERPL-SCNC: 141 MMOL/L (ref 135–145)
TRIGL SERPL-MCNC: 90 MG/DL
WBC # BLD AUTO: 6.4 10E3/UL (ref 4–11)

## 2024-05-31 PROCEDURE — 85004 AUTOMATED DIFF WBC COUNT: CPT

## 2024-05-31 PROCEDURE — 86140 C-REACTIVE PROTEIN: CPT

## 2024-05-31 PROCEDURE — 84478 ASSAY OF TRIGLYCERIDES: CPT

## 2024-05-31 PROCEDURE — 84630 ASSAY OF ZINC: CPT

## 2024-05-31 PROCEDURE — 82248 BILIRUBIN DIRECT: CPT

## 2024-05-31 PROCEDURE — 82728 ASSAY OF FERRITIN: CPT

## 2024-05-31 PROCEDURE — 82040 ASSAY OF SERUM ALBUMIN: CPT

## 2024-05-31 PROCEDURE — 36415 COLL VENOUS BLD VENIPUNCTURE: CPT

## 2024-05-31 PROCEDURE — 83735 ASSAY OF MAGNESIUM: CPT

## 2024-05-31 PROCEDURE — 250N000011 HC RX IP 250 OP 636: Performed by: INTERNAL MEDICINE

## 2024-05-31 PROCEDURE — 84100 ASSAY OF PHOSPHORUS: CPT

## 2024-05-31 RX ORDER — HEPARIN SODIUM,PORCINE 10 UNIT/ML
5-20 VIAL (ML) INTRAVENOUS DAILY PRN
Status: DISCONTINUED | OUTPATIENT
Start: 2024-05-31 | End: 2024-05-31 | Stop reason: HOSPADM

## 2024-05-31 RX ORDER — HEPARIN SODIUM,PORCINE 10 UNIT/ML
5-20 VIAL (ML) INTRAVENOUS DAILY PRN
OUTPATIENT
Start: 2024-05-31

## 2024-05-31 RX ADMIN — HEPARIN, PORCINE (PF) 10 UNIT/ML INTRAVENOUS SYRINGE 5 ML: at 15:44

## 2024-05-31 RX ADMIN — HEPARIN, PORCINE (PF) 10 UNIT/ML INTRAVENOUS SYRINGE 5 ML: at 15:45

## 2024-05-31 NOTE — PROGRESS NOTES
Infusion Nursing Note:  Parker Acevedo presents today for PICC labs with PICC drsg change..    Patient seen by provider today: No   present during visit today: Not Applicable.    Note: N/A.      Intravenous Access:  Labs drawn without difficulty.  PICC.      Windy Chowdhury RN

## 2024-06-02 LAB — ZINC SERPL-MCNC: 63.4 UG/DL

## 2024-06-03 ENCOUNTER — OFFICE VISIT (OUTPATIENT)
Dept: INTERNAL MEDICINE | Facility: CLINIC | Age: 54
End: 2024-06-03
Payer: COMMERCIAL

## 2024-06-03 VITALS
HEART RATE: 95 BPM | HEIGHT: 70 IN | WEIGHT: 191 LBS | RESPIRATION RATE: 12 BRPM | BODY MASS INDEX: 27.35 KG/M2 | OXYGEN SATURATION: 96 % | SYSTOLIC BLOOD PRESSURE: 127 MMHG | TEMPERATURE: 102.2 F | DIASTOLIC BLOOD PRESSURE: 72 MMHG

## 2024-06-03 DIAGNOSIS — Z98.84 S/P BARIATRIC SURGERY: ICD-10-CM

## 2024-06-03 DIAGNOSIS — R50.9 FEVER, UNSPECIFIED FEVER CAUSE: Primary | ICD-10-CM

## 2024-06-03 DIAGNOSIS — F41.9 CHRONIC ANXIETY: ICD-10-CM

## 2024-06-03 DIAGNOSIS — F90.0 ADHD (ATTENTION DEFICIT HYPERACTIVITY DISORDER), INATTENTIVE TYPE: ICD-10-CM

## 2024-06-03 DIAGNOSIS — E78.5 HYPERLIPIDEMIA LDL GOAL <130: ICD-10-CM

## 2024-06-03 DIAGNOSIS — B35.1 ONYCHOMYCOSIS: ICD-10-CM

## 2024-06-03 DIAGNOSIS — L60.0 INGROWN NAIL: ICD-10-CM

## 2024-06-03 LAB
BASOPHILS # BLD AUTO: 0 10E3/UL (ref 0–0.2)
BASOPHILS NFR BLD AUTO: 0 %
EOSINOPHIL # BLD AUTO: 0 10E3/UL (ref 0–0.7)
EOSINOPHIL NFR BLD AUTO: 0 %
IMM GRANULOCYTES # BLD: 0 10E3/UL
IMM GRANULOCYTES NFR BLD: 0 %
LYMPHOCYTES # BLD AUTO: 0.4 10E3/UL (ref 0.8–5.3)
LYMPHOCYTES NFR BLD AUTO: 4 %
MONOCYTES # BLD AUTO: 0.7 10E3/UL (ref 0–1.3)
MONOCYTES NFR BLD AUTO: 6 %
NEUTROPHILS # BLD AUTO: 10.1 10E3/UL (ref 1.6–8.3)
NEUTROPHILS NFR BLD AUTO: 89 %
NRBC # BLD AUTO: 0 10E3/UL
NRBC BLD AUTO-RTO: 0 /100
WBC # BLD AUTO: 11.3 10E3/UL (ref 4–11)

## 2024-06-03 PROCEDURE — 96127 BRIEF EMOTIONAL/BEHAV ASSMT: CPT | Performed by: INTERNAL MEDICINE

## 2024-06-03 PROCEDURE — 87040 BLOOD CULTURE FOR BACTERIA: CPT | Performed by: INTERNAL MEDICINE

## 2024-06-03 PROCEDURE — 87186 SC STD MICRODIL/AGAR DIL: CPT | Performed by: INTERNAL MEDICINE

## 2024-06-03 PROCEDURE — 36415 COLL VENOUS BLD VENIPUNCTURE: CPT | Performed by: INTERNAL MEDICINE

## 2024-06-03 PROCEDURE — 99214 OFFICE O/P EST MOD 30 MIN: CPT | Performed by: INTERNAL MEDICINE

## 2024-06-03 PROCEDURE — 87149 DNA/RNA DIRECT PROBE: CPT | Performed by: INTERNAL MEDICINE

## 2024-06-03 PROCEDURE — 87077 CULTURE AEROBIC IDENTIFY: CPT | Performed by: INTERNAL MEDICINE

## 2024-06-03 PROCEDURE — 85004 AUTOMATED DIFF WBC COUNT: CPT | Performed by: INTERNAL MEDICINE

## 2024-06-03 PROCEDURE — 85048 AUTOMATED LEUKOCYTE COUNT: CPT | Performed by: INTERNAL MEDICINE

## 2024-06-03 PROCEDURE — G2211 COMPLEX E/M VISIT ADD ON: HCPCS | Performed by: INTERNAL MEDICINE

## 2024-06-03 RX ORDER — DEXTROAMPHETAMINE SACCHARATE, AMPHETAMINE ASPARTATE, DEXTROAMPHETAMINE SULFATE AND AMPHETAMINE SULFATE 5; 5; 5; 5 MG/1; MG/1; MG/1; MG/1
TABLET ORAL
Qty: 30 TABLET | Refills: 0 | Status: SHIPPED | OUTPATIENT
Start: 2024-06-03 | End: 2024-06-28

## 2024-06-03 RX ORDER — ALPRAZOLAM 0.5 MG
TABLET ORAL
Qty: 60 TABLET | Refills: 0 | Status: SHIPPED | OUTPATIENT
Start: 2024-06-03 | End: 2024-06-28

## 2024-06-03 ASSESSMENT — ANXIETY QUESTIONNAIRES
GAD7 TOTAL SCORE: 2
3. WORRYING TOO MUCH ABOUT DIFFERENT THINGS: NOT AT ALL
GAD7 TOTAL SCORE: 2
6. BECOMING EASILY ANNOYED OR IRRITABLE: NOT AT ALL
4. TROUBLE RELAXING: SEVERAL DAYS
2. NOT BEING ABLE TO STOP OR CONTROL WORRYING: NOT AT ALL
7. FEELING AFRAID AS IF SOMETHING AWFUL MIGHT HAPPEN: NOT AT ALL
5. BEING SO RESTLESS THAT IT IS HARD TO SIT STILL: SEVERAL DAYS
7. FEELING AFRAID AS IF SOMETHING AWFUL MIGHT HAPPEN: NOT AT ALL
8. IF YOU CHECKED OFF ANY PROBLEMS, HOW DIFFICULT HAVE THESE MADE IT FOR YOU TO DO YOUR WORK, TAKE CARE OF THINGS AT HOME, OR GET ALONG WITH OTHER PEOPLE?: NOT DIFFICULT AT ALL
1. FEELING NERVOUS, ANXIOUS, OR ON EDGE: NOT AT ALL
IF YOU CHECKED OFF ANY PROBLEMS ON THIS QUESTIONNAIRE, HOW DIFFICULT HAVE THESE PROBLEMS MADE IT FOR YOU TO DO YOUR WORK, TAKE CARE OF THINGS AT HOME, OR GET ALONG WITH OTHER PEOPLE: NOT DIFFICULT AT ALL

## 2024-06-03 ASSESSMENT — PAIN SCALES - GENERAL: PAINLEVEL: SEVERE PAIN (6)

## 2024-06-03 NOTE — PROGRESS NOTES
"  Assessment & Plan     Fever, unspecified fever cause  Fever in a patient who is known  Infection many times, I recommended he go to the emergency room and have his line accessed and treated for antibiotics for probable line infection he does not want to go.  He knows this is AGAINST MEDICAL ADVICE.  I recommended to go to [facility] knows him well.  He is a very difficult access vascularly.  We will do a peripheral culture and a white count today.  He knows that he gets sicker or septic to go he has been through this before he knows that this infection could kill him he is aware of this.  He says he will not run his TPN or IV fluids through the line.  - Blood Culture Peripheral Blood; Future  - WBC with Diff; Future  - Blood Culture Peripheral Blood  - WBC with Diff    ADHD (attention deficit hyperactivity disorder), inattentive type  ADHD has been on Adderall for many years he continues to be on this takes it appropriately a 30 pills every month 1 a day.  Refill is done  - amphetamine-dextroamphetamine (ADDERALL) 20 MG tablet; TAKE ONE TABLET BY MOUTH ONCE DAILY    Chronic anxiety  Vee he maintains on Xanax twice a day and will refill for 6 pills  - ALPRAZolam (XANAX) 0.5 MG tablet; TAKE ONE TABLET BY MOUTH TWICE A DAY AS NEEDED FOR SLEEP OR ANXIETY    Hyperlipidemia LDL goal <130      Onychomycosis  Right foot getting with some ingrown nail will refer to orthopedics or podiatry to see if this can be removed.  - Orthopedic  Referral; Future    Ingrown nail  Move nail  - Orthopedic  Referral; Future    S/P bariatric surgery  Bariatric surgery which does not go well he is dependent on TPN and IV fluids taking some water today but knows he needs to go into the emergency room.          BMI  Estimated body mass index is 27.6 kg/m  as calculated from the following:    Height as of this encounter: 1.772 m (5' 9.75\").    Weight as of this encounter: 86.6 kg (191 lb).     The longitudinal " "plan of care for the diagnosis(es)/condition(s) as documented were addressed during this visit. Due to the added complexity in care, I will continue to support Parker in the subsequent management and with ongoing continuity of care.        Electronically signed by Chuy Isaac MD          Teodoro Canales is a 53 year old, presenting for the following health issues:  Recheck Medication      6/3/2024     4:01 PM   Additional Questions   Roomed by Eva SAENZ     History of Present Illness       Mental Health Follow-up:  Patient presents to follow-up on Anxiety.    Patient's anxiety since last visit has been:  Better  The patient is not having other symptoms associated with anxiety.  Any significant life events: No  Patient is not feeling anxious or having panic attacks.  Patient has no concerns about alcohol or drug use.    Reason for visit:  Follow up    He eats 0-1 servings of fruits and vegetables daily.He consumes 2 sweetened beverage(s) daily.He exercises with enough effort to increase his heart rate 9 or less minutes per day.  He exercises with enough effort to increase his heart rate 3 or less days per week.   He is taking medications regularly.       Fever up to 102. Will not use line until seen with blood culture at the [place].     Last infection of line was in January    Needs refills today.      Never got ritalin, insurance approved Adderall, needs refill today.     Needs xanax as well.  2 a day and due to for refill.     Right thickened toenail growing inward, would like to get removed.               Objective    /72   Pulse 95   Temp (!) 102.2  F (39  C) (Temporal)   Resp 12   Ht 1.772 m (5' 9.75\")   Wt 86.6 kg (191 lb)   SpO2 96%   BMI 27.60 kg/m    Body mass index is 27.6 kg/m .  Physical Exam   Mild to moderate distress, patient says he is fine but looks flushed  Heart is regular  Lungs are clear  Left anterior chest catheter present  Extremities have trace edema            Signed " Electronically by: Chuy Isaac MD

## 2024-06-05 ENCOUNTER — MYC REFILL (OUTPATIENT)
Dept: INTERNAL MEDICINE | Facility: CLINIC | Age: 54
End: 2024-06-05
Payer: COMMERCIAL

## 2024-06-05 ENCOUNTER — MYC REFILL (OUTPATIENT)
Dept: PALLIATIVE MEDICINE | Facility: CLINIC | Age: 54
End: 2024-06-05
Payer: COMMERCIAL

## 2024-06-05 DIAGNOSIS — G89.4 CHRONIC PAIN SYNDROME: ICD-10-CM

## 2024-06-05 DIAGNOSIS — R10.9 CHRONIC ABDOMINAL PAIN: ICD-10-CM

## 2024-06-05 DIAGNOSIS — M54.50 CHRONIC BILATERAL LOW BACK PAIN WITHOUT SCIATICA: ICD-10-CM

## 2024-06-05 DIAGNOSIS — R11.0 NAUSEA: ICD-10-CM

## 2024-06-05 DIAGNOSIS — E55.9 VITAMIN D DEFICIENCY: ICD-10-CM

## 2024-06-05 DIAGNOSIS — G89.29 CHRONIC BILATERAL LOW BACK PAIN WITHOUT SCIATICA: ICD-10-CM

## 2024-06-05 DIAGNOSIS — G89.29 CHRONIC ABDOMINAL PAIN: ICD-10-CM

## 2024-06-05 DIAGNOSIS — R19.8 VISCERAL HYPERALGESIA: ICD-10-CM

## 2024-06-05 DIAGNOSIS — F11.90 CHRONIC, CONTINUOUS USE OF OPIOIDS: ICD-10-CM

## 2024-06-05 DIAGNOSIS — I82.90 VTE (VENOUS THROMBOEMBOLISM): ICD-10-CM

## 2024-06-06 ENCOUNTER — CARE COORDINATION (OUTPATIENT)
Dept: ENDOCRINOLOGY | Facility: CLINIC | Age: 54
End: 2024-06-06
Payer: COMMERCIAL

## 2024-06-06 RX ORDER — ENOXAPARIN SODIUM 100 MG/ML
80 INJECTION SUBCUTANEOUS 2 TIMES DAILY
Qty: 67.2 ML | Refills: 0 | Status: SHIPPED | OUTPATIENT
Start: 2024-06-06

## 2024-06-06 RX ORDER — ONDANSETRON 8 MG/1
TABLET, ORALLY DISINTEGRATING ORAL
Qty: 90 TABLET | Refills: 1 | OUTPATIENT
Start: 2024-06-06

## 2024-06-06 RX ORDER — ERGOCALCIFEROL 1.25 MG/1
50000 CAPSULE, LIQUID FILLED ORAL WEEKLY
Qty: 12 CAPSULE | Refills: 3 | OUTPATIENT
Start: 2024-06-06

## 2024-06-06 NOTE — PROGRESS NOTES
Informed patients wife, (patient was in room) that [facility] infusion is implementing a 10 day discharge due to patient going against medical advice. Patient had positive blood cultures and fever and did not go to the ED as advised and this broke the contract in place.   Wife states she warned patient and told him he needed to go but he just didn't listen. Advised patient to go to ED and be evaluated if not feeling well.  Will inform Dr Vyas of above.

## 2024-06-06 NOTE — TELEPHONE ENCOUNTER
Received request for a refill(s) of     fentaNYL (DURAGESIC) 25 mcg/hr 72 hr patch  Last dispensed from pharmacy on 05/25/24  oxyCODONE (ROXICODONE) 5 MG/5ML solution   Last dispensed from pharmacy on 05/25/24    Patient's last office/virtual visit by prescribing provider on 03/04/24  Next office/virtual appointment scheduled for 06/11/24    Last urine drug screen date 11/01/23  Current opioid agreement on file (completed within the last year) Yes Date of opioid agreement: 11/01/23    E-prescribe to     [place], [place] - [address] [place] [address]  [address] [facility] [address]  Phone: [phone] Fax: [phone]    Will route to nursing Ernest for review and preparation of prescription(s).       Deidra Banks MA  [facility]

## 2024-06-06 NOTE — TELEPHONE ENCOUNTER
Will route to [place] pool for assistance with gathering opioid refill information.    Perla NJ RN Care Coordinator  [facility]        Parker FRIAS Pain Nurse12 hours ago (8:36 PM)       Refills have been requested for the following medications:         fentaNYL (DURAGESIC) 25 mcg/hr 72 hr patch [Natalie Hull]         oxyCODONE (ROXICODONE) 5 MG/5ML solution [Natalie Hull]     Preferred pharmacy: [facility] [place], [place] - [address] [place] [address]

## 2024-06-07 ENCOUNTER — TELEPHONE (OUTPATIENT)
Dept: INTERNAL MEDICINE | Facility: CLINIC | Age: 54
End: 2024-06-07
Payer: COMMERCIAL

## 2024-06-07 LAB
BACTERIA BLD CULT: ABNORMAL
BACTERIA BLD CULT: ABNORMAL

## 2024-06-07 RX ORDER — OXYCODONE HCL 5 MG/5 ML
5-10 SOLUTION, ORAL ORAL EVERY 4 HOURS PRN
Qty: 1200 ML | Refills: 0 | Status: SHIPPED | OUTPATIENT
Start: 2024-06-07 | End: 2024-07-09

## 2024-06-07 RX ORDER — FENTANYL 25 UG/1
1 PATCH TRANSDERMAL
Qty: 15 PATCH | Refills: 0 | Status: SHIPPED | OUTPATIENT
Start: 2024-06-07 | End: 2024-07-09

## 2024-06-07 NOTE — TELEPHONE ENCOUNTER
Prior Authorization Retail Medication Request    Medication/Dose: Enoxaparin 80mg/0.8ml  Diagnosis and ICD code (if different than what is on RX):  n/a  New/renewal/insurance change PA/secondary ins. PA:  Previously Tried and Failed:  n/a  Rationale:  previous prior auth  24, needs update prior auth    Insurance   Primary: [employer] [place] Part [place]  Insurance ID:  [id number]    Secondary (if applicable):n/a  Insurance ID:  n/a    Pharmacy Information (if different than what is on RX)  Name:  n/a  Phone:  n/a  Fax:n/a    Thank You~  Aleksandra Araujo [facility]  Telephone

## 2024-06-07 NOTE — TELEPHONE ENCOUNTER
Prior Authorization Approval    Medication: ENOXAPARIN SODIUM 80 MG/0.8ML IJ SOSY  Authorization Effective Date: 3/9/2024  Authorization Expiration Date: 6/7/2025  Approved Dose/Quantity: 67.2/42  Reference #: [id number]   Insurance Company: [employer] - Phone [phone] Fax [phone]  Expected CoPay: $    CoPay Card Available:      Financial Assistance Needed:   Which Pharmacy is filling the prescription: [facility] [place] - [address] [place] [address]  Pharmacy Notified: Yes  Patient Notified: yes

## 2024-06-07 NOTE — TELEPHONE ENCOUNTER
Medication refill information reviewed.     Due date for fentaNYL (DURAGESIC) 25 mcg/hr 72 hr patch    oxyCODONE (ROXICODONE) 5 MG/5ML solution  is 6/26/2024 for both     Prescriptions prepped for review.     Will route to provider.

## 2024-06-07 NOTE — TELEPHONE ENCOUNTER
Signed Prescriptions:                        Disp   Refills    fentaNYL (DURAGESIC) 25 mcg/hr 72 hr patch 15 pat*0        Sig: Place 1 patch onto the skin every 48 hours OK to fill           6/24/24 with start 6/26/24. 30 day supply for           chronic pain.  Authorizing Provider: NATALIE MCDOWELL    oxyCODONE (ROXICODONE) 5 MG/5ML solution   1200 mL0        Sig: Take 5-10 mLs (5-10 mg) by mouth every 4 hours as           needed for moderate to severe pain Max of           40mg/day. OK to fill 6/24/24 with start 6/26/24.           30 day supply for chronic pain.  Authorizing Provider: NATALIE MCDOWELL        Reviewed [place]  June 7, 2024- no concerning fills.    Natalie MENARD, RN CNP, FNP  [facility]  [facility]

## 2024-06-07 NOTE — TELEPHONE ENCOUNTER
PA Initiation    Medication: ENOXAPARIN SODIUM 80 MG/0.8ML IJ SOSY  Insurance Company: [employer] - Phone [phone] Fax [phone]  Pharmacy Filling the Rx: [facility] [place], [place] - [address] [place] [address]  Filling Pharmacy Phone:    Filling Pharmacy Fax:    Start Date: 6/7/2024

## 2024-06-10 ENCOUNTER — TELEPHONE (OUTPATIENT)
Dept: ENDOCRINOLOGY | Facility: CLINIC | Age: 54
End: 2024-06-10
Payer: COMMERCIAL

## 2024-06-10 ENCOUNTER — MEDICAL CORRESPONDENCE (OUTPATIENT)
Dept: HEALTH INFORMATION MANAGEMENT | Facility: CLINIC | Age: 54
End: 2024-06-10
Payer: COMMERCIAL

## 2024-06-10 NOTE — TELEPHONE ENCOUNTER
General Call      Reason for Call:  Karis manuel/[facility]  calling to request chart notes & labs for the patient. Patient requesting TPN through the company. Fax # [phone]    What are your questions or concerns:  see above    Date of last appointment with provider: 8/2023    Could we send this information to you in [employer] or would you prefer to receive a phone call?:   Patient would prefer a phone call   Okay to leave a detailed message?: No at Other phone number:  [phone] ext [phone] or [phone]*

## 2024-06-11 ENCOUNTER — MEDICAL CORRESPONDENCE (OUTPATIENT)
Dept: HEALTH INFORMATION MANAGEMENT | Facility: CLINIC | Age: 54
End: 2024-06-11

## 2024-06-11 ENCOUNTER — CARE COORDINATION (OUTPATIENT)
Dept: ENDOCRINOLOGY | Facility: CLINIC | Age: 54
End: 2024-06-11

## 2024-06-11 NOTE — PROGRESS NOTES
Patient notified he can't have any infusions until his Cm is assessed in ED for infection. Wife verbalized understanding and states she has been trying to get patient to go in but he hasn't yet.

## 2024-06-11 NOTE — PROGRESS NOTES
Patient was seen by Dr Isaac, he was febrile and blood cultures positive on 6/3. Patient advised by Dr Isaac to go to ED to have Cm assessed. Patient did not go. [facility] infusion placed patient on 10 day discharge due to going against medical advice.   New home infusion [facility].   Per Jyothi Peraza Pharm D Rijuven not aware of possible infected Cm. Dr Vyas informed to advise on order to hold infusions until Cm assessed.

## 2024-06-14 ENCOUNTER — MYC MEDICAL ADVICE (OUTPATIENT)
Dept: INTERNAL MEDICINE | Facility: CLINIC | Age: 54
End: 2024-06-14
Payer: COMMERCIAL

## 2024-06-14 ENCOUNTER — HOSPITAL ENCOUNTER (INPATIENT)
Facility: CLINIC | Age: 54
LOS: 5 days | Discharge: LEFT BEFORE DISCHARGE | DRG: 314 | End: 2024-06-19
Attending: EMERGENCY MEDICINE | Admitting: STUDENT IN AN ORGANIZED HEALTH CARE EDUCATION/TRAINING PROGRAM
Payer: COMMERCIAL

## 2024-06-14 DIAGNOSIS — F41.9 ANXIETY: ICD-10-CM

## 2024-06-14 DIAGNOSIS — E87.6 HYPOKALEMIA: ICD-10-CM

## 2024-06-14 DIAGNOSIS — T82.7XXA BACTEREMIA ASSOCIATED WITH INTRAVASCULAR LINE, INITIAL ENCOUNTER (H): Primary | ICD-10-CM

## 2024-06-14 DIAGNOSIS — F51.01 PRIMARY INSOMNIA: ICD-10-CM

## 2024-06-14 DIAGNOSIS — R78.81 BACTEREMIA ASSOCIATED WITH INTRAVASCULAR LINE, INITIAL ENCOUNTER (H): Primary | ICD-10-CM

## 2024-06-14 DIAGNOSIS — Z98.84 HISTORY OF ROUX-EN-Y GASTRIC BYPASS: ICD-10-CM

## 2024-06-14 DIAGNOSIS — T80.211A BLOODSTREAM INFECTION ASSOCIATED WITH CENTRAL VENOUS CATHETER, INITIAL ENCOUNTER: ICD-10-CM

## 2024-06-14 DIAGNOSIS — R78.81 BACTEREMIA: ICD-10-CM

## 2024-06-14 LAB
ALBUMIN SERPL BCG-MCNC: 3.5 G/DL (ref 3.5–5.2)
ALP SERPL-CCNC: 88 U/L (ref 40–150)
ALT SERPL W P-5'-P-CCNC: 22 U/L (ref 0–70)
ANION GAP SERPL CALCULATED.3IONS-SCNC: 12 MMOL/L (ref 7–15)
AST SERPL W P-5'-P-CCNC: 26 U/L (ref 0–45)
BASOPHILS # BLD AUTO: 0.1 10E3/UL (ref 0–0.2)
BASOPHILS NFR BLD AUTO: 1 %
BILIRUB SERPL-MCNC: 0.3 MG/DL
BUN SERPL-MCNC: 5.2 MG/DL (ref 6–20)
CALCIUM SERPL-MCNC: 8.3 MG/DL (ref 8.6–10)
CHLORIDE SERPL-SCNC: 106 MMOL/L (ref 98–107)
CREAT SERPL-MCNC: 0.77 MG/DL (ref 0.67–1.17)
DEPRECATED HCO3 PLAS-SCNC: 23 MMOL/L (ref 22–29)
EGFRCR SERPLBLD CKD-EPI 2021: >90 ML/MIN/1.73M2
EOSINOPHIL # BLD AUTO: 0.3 10E3/UL (ref 0–0.7)
EOSINOPHIL NFR BLD AUTO: 4 %
ERYTHROCYTE [DISTWIDTH] IN BLOOD BY AUTOMATED COUNT: 15.8 % (ref 10–15)
GLUCOSE SERPL-MCNC: 103 MG/DL (ref 70–99)
HCT VFR BLD AUTO: 37.8 % (ref 40–53)
HGB BLD-MCNC: 12.5 G/DL (ref 13.3–17.7)
IMM GRANULOCYTES # BLD: 0 10E3/UL
IMM GRANULOCYTES NFR BLD: 1 %
LACTATE SERPL-SCNC: 1.6 MMOL/L (ref 0.7–2)
LYMPHOCYTES # BLD AUTO: 2.4 10E3/UL (ref 0.8–5.3)
LYMPHOCYTES NFR BLD AUTO: 29 %
MAGNESIUM SERPL-MCNC: 2 MG/DL (ref 1.7–2.3)
MCH RBC QN AUTO: 29.4 PG (ref 26.5–33)
MCHC RBC AUTO-ENTMCNC: 33.1 G/DL (ref 31.5–36.5)
MCV RBC AUTO: 89 FL (ref 78–100)
MONOCYTES # BLD AUTO: 0.6 10E3/UL (ref 0–1.3)
MONOCYTES NFR BLD AUTO: 7 %
NEUTROPHILS # BLD AUTO: 4.8 10E3/UL (ref 1.6–8.3)
NEUTROPHILS NFR BLD AUTO: 58 %
NRBC # BLD AUTO: 0 10E3/UL
NRBC BLD AUTO-RTO: 0 /100
PLATELET # BLD AUTO: 217 10E3/UL (ref 150–450)
POTASSIUM SERPL-SCNC: 3.1 MMOL/L (ref 3.4–5.3)
PROT SERPL-MCNC: 6.3 G/DL (ref 6.4–8.3)
RBC # BLD AUTO: 4.25 10E6/UL (ref 4.4–5.9)
SODIUM SERPL-SCNC: 141 MMOL/L (ref 135–145)
WBC # BLD AUTO: 8.2 10E3/UL (ref 4–11)

## 2024-06-14 PROCEDURE — 80053 COMPREHEN METABOLIC PANEL: CPT | Performed by: EMERGENCY MEDICINE

## 2024-06-14 PROCEDURE — 96365 THER/PROPH/DIAG IV INF INIT: CPT | Performed by: EMERGENCY MEDICINE

## 2024-06-14 PROCEDURE — 36415 COLL VENOUS BLD VENIPUNCTURE: CPT | Performed by: EMERGENCY MEDICINE

## 2024-06-14 PROCEDURE — 87149 DNA/RNA DIRECT PROBE: CPT | Performed by: EMERGENCY MEDICINE

## 2024-06-14 PROCEDURE — 99285 EMERGENCY DEPT VISIT HI MDM: CPT | Performed by: EMERGENCY MEDICINE

## 2024-06-14 PROCEDURE — 120N000002 HC R&B MED SURG/OB UMMC

## 2024-06-14 PROCEDURE — 99285 EMERGENCY DEPT VISIT HI MDM: CPT | Mod: 25 | Performed by: EMERGENCY MEDICINE

## 2024-06-14 PROCEDURE — 87186 SC STD MICRODIL/AGAR DIL: CPT | Performed by: EMERGENCY MEDICINE

## 2024-06-14 PROCEDURE — 250N000013 HC RX MED GY IP 250 OP 250 PS 637: Performed by: EMERGENCY MEDICINE

## 2024-06-14 PROCEDURE — 83605 ASSAY OF LACTIC ACID: CPT | Performed by: EMERGENCY MEDICINE

## 2024-06-14 PROCEDURE — 83735 ASSAY OF MAGNESIUM: CPT | Performed by: EMERGENCY MEDICINE

## 2024-06-14 PROCEDURE — 250N000011 HC RX IP 250 OP 636: Mod: JZ | Performed by: EMERGENCY MEDICINE

## 2024-06-14 PROCEDURE — 85025 COMPLETE CBC W/AUTO DIFF WBC: CPT | Performed by: EMERGENCY MEDICINE

## 2024-06-14 PROCEDURE — 258N000003 HC RX IP 258 OP 636: Mod: JZ | Performed by: EMERGENCY MEDICINE

## 2024-06-14 RX ORDER — CARVEDILOL 6.25 MG/1
6.25 TABLET ORAL ONCE
Status: COMPLETED | OUTPATIENT
Start: 2024-06-14 | End: 2024-06-15

## 2024-06-14 RX ORDER — POTASSIUM CHLORIDE 20MEQ/15ML
40 LIQUID (ML) ORAL ONCE
Status: COMPLETED | OUTPATIENT
Start: 2024-06-14 | End: 2024-06-15

## 2024-06-14 RX ORDER — OXYCODONE HCL 5 MG/5 ML
10 SOLUTION, ORAL ORAL ONCE
Status: COMPLETED | OUTPATIENT
Start: 2024-06-14 | End: 2024-06-14

## 2024-06-14 RX ORDER — ALPRAZOLAM 0.25 MG
0.5 TABLET ORAL ONCE
Status: COMPLETED | OUTPATIENT
Start: 2024-06-14 | End: 2024-06-14

## 2024-06-14 RX ORDER — LEVOFLOXACIN 5 MG/ML
750 INJECTION, SOLUTION INTRAVENOUS ONCE
Status: COMPLETED | OUTPATIENT
Start: 2024-06-14 | End: 2024-06-14

## 2024-06-14 RX ADMIN — ALPRAZOLAM 0.5 MG: 0.25 TABLET ORAL at 22:15

## 2024-06-14 RX ADMIN — LEVOFLOXACIN 750 MG: 5 INJECTION, SOLUTION INTRAVENOUS at 22:03

## 2024-06-14 RX ADMIN — OXYCODONE HYDROCHLORIDE 10 MG: 5 SOLUTION ORAL at 22:03

## 2024-06-14 RX ADMIN — SODIUM CHLORIDE, POTASSIUM CHLORIDE, SODIUM LACTATE AND CALCIUM CHLORIDE 1000 ML: 600; 310; 30; 20 INJECTION, SOLUTION INTRAVENOUS at 22:00

## 2024-06-14 ASSESSMENT — COLUMBIA-SUICIDE SEVERITY RATING SCALE - C-SSRS
1. IN THE PAST MONTH, HAVE YOU WISHED YOU WERE DEAD OR WISHED YOU COULD GO TO SLEEP AND NOT WAKE UP?: NO
2. HAVE YOU ACTUALLY HAD ANY THOUGHTS OF KILLING YOURSELF IN THE PAST MONTH?: NO
6. HAVE YOU EVER DONE ANYTHING, STARTED TO DO ANYTHING, OR PREPARED TO DO ANYTHING TO END YOUR LIFE?: NO

## 2024-06-14 ASSESSMENT — ACTIVITIES OF DAILY LIVING (ADL)
ADLS_ACUITY_SCORE: 39
ADLS_ACUITY_SCORE: 37

## 2024-06-14 NOTE — LETTER
[facility] 7C MED SURG  [address]  [place] [address]  [phone]    FACSIMILE TRANSMITTAL SHEET    TO: Intake   COMPANY: [employer]   FAX NUMBER: [phone]  PHONE NUMBER: [phone]      FROM: GERRI Munson  PHONE: [phone]  DATE: 06/18/24  NUMBER OF PAGES: n/a    _____URGENT __x_REVIEW ONLY _____PLEASE COMMENT____PLEASE REPLY    NOTES/COMMENTS: JOSEPH Acevedo ([birth year])    Discharge Summary/IATF Orders                                        IF YOU DID NOT RECEIVE THE CORRECT NUMBER OF PAGES OR THE FAX DID NOT COME THROUGH CLEARLY, PLEASE CALL THE SENDER     CONFIDENTIALITY STATEMENT: Confidential information that may accompany this transmission contains protected health information under state and federal law and is legally privileged. This information is intended only for the use of the individual or entity named above and may be used only for carrying out treatment, payment or other healthcare operations. The recipient or person responsible for delivering this information is prohibited by law from disclosing this information without proper authorization to any other party, unless required to do so by law or regulation. If you are not the intended recipient, you are hereby notified that any review, dissemination, distribution, or copying of this message is strictly prohibited. If you have received this communication in error, please destroy the materials and contact us immediately by calling the number listed above. No response indicates that the information was received by the appropriate authorized party

## 2024-06-15 ENCOUNTER — APPOINTMENT (OUTPATIENT)
Dept: CARDIOLOGY | Facility: CLINIC | Age: 54
DRG: 314 | End: 2024-06-15
Payer: COMMERCIAL

## 2024-06-15 LAB
ENTEROCOCCUS FAECALIS: NOT DETECTED
ENTEROCOCCUS FAECIUM: NOT DETECTED
GLUCOSE BLDC GLUCOMTR-MCNC: 101 MG/DL (ref 70–99)
HOLD SPECIMEN: NORMAL
LISTERIA SPECIES (DETECTED/NOT DETECTED): NOT DETECTED
LVEF ECHO: NORMAL
STAPHYLOCOCCUS AUREUS: NOT DETECTED
STAPHYLOCOCCUS EPIDERMIDIS: DETECTED
STAPHYLOCOCCUS LUGDUNENSIS: NOT DETECTED
STREPTOCOCCUS AGALACTIAE: NOT DETECTED
STREPTOCOCCUS ANGINOSUS GROUP: NOT DETECTED
STREPTOCOCCUS PNEUMONIAE: NOT DETECTED
STREPTOCOCCUS PYOGENES: NOT DETECTED
STREPTOCOCCUS SPECIES: NOT DETECTED

## 2024-06-15 PROCEDURE — 99418 PROLNG IP/OBS E/M EA 15 MIN: CPT | Performed by: STUDENT IN AN ORGANIZED HEALTH CARE EDUCATION/TRAINING PROGRAM

## 2024-06-15 PROCEDURE — 250N000013 HC RX MED GY IP 250 OP 250 PS 637

## 2024-06-15 PROCEDURE — 36415 COLL VENOUS BLD VENIPUNCTURE: CPT

## 2024-06-15 PROCEDURE — 93306 TTE W/DOPPLER COMPLETE: CPT

## 2024-06-15 PROCEDURE — 99223 1ST HOSP IP/OBS HIGH 75: CPT | Performed by: STUDENT IN AN ORGANIZED HEALTH CARE EDUCATION/TRAINING PROGRAM

## 2024-06-15 PROCEDURE — 99207 PR APP CREDIT; MD BILLING SHARED VISIT: CPT

## 2024-06-15 PROCEDURE — 0JPT3XZ REMOVAL OF TUNNELED VASCULAR ACCESS DEVICE FROM TRUNK SUBCUTANEOUS TISSUE AND FASCIA, PERCUTANEOUS APPROACH: ICD-10-PCS | Performed by: STUDENT IN AN ORGANIZED HEALTH CARE EDUCATION/TRAINING PROGRAM

## 2024-06-15 PROCEDURE — 93306 TTE W/DOPPLER COMPLETE: CPT | Mod: 26 | Performed by: STUDENT IN AN ORGANIZED HEALTH CARE EDUCATION/TRAINING PROGRAM

## 2024-06-15 PROCEDURE — 120N000002 HC R&B MED SURG/OB UMMC

## 2024-06-15 PROCEDURE — 99221 1ST HOSP IP/OBS SF/LOW 40: CPT | Mod: 4UV | Performed by: STUDENT IN AN ORGANIZED HEALTH CARE EDUCATION/TRAINING PROGRAM

## 2024-06-15 PROCEDURE — 250N000011 HC RX IP 250 OP 636: Mod: JZ | Performed by: STUDENT IN AN ORGANIZED HEALTH CARE EDUCATION/TRAINING PROGRAM

## 2024-06-15 PROCEDURE — 87070 CULTURE OTHR SPECIMN AEROBIC: CPT

## 2024-06-15 PROCEDURE — 250N000013 HC RX MED GY IP 250 OP 250 PS 637: Performed by: EMERGENCY MEDICINE

## 2024-06-15 PROCEDURE — 87186 SC STD MICRODIL/AGAR DIL: CPT

## 2024-06-15 PROCEDURE — 999N000128 HC STATISTIC PERIPHERAL IV START W/O US GUIDANCE

## 2024-06-15 PROCEDURE — 87040 BLOOD CULTURE FOR BACTERIA: CPT

## 2024-06-15 PROCEDURE — 258N000003 HC RX IP 258 OP 636: Mod: JZ | Performed by: STUDENT IN AN ORGANIZED HEALTH CARE EDUCATION/TRAINING PROGRAM

## 2024-06-15 PROCEDURE — 250N000013 HC RX MED GY IP 250 OP 250 PS 637: Performed by: STUDENT IN AN ORGANIZED HEALTH CARE EDUCATION/TRAINING PROGRAM

## 2024-06-15 RX ORDER — FENTANYL 25 UG/1
25 PATCH TRANSDERMAL
Status: DISCONTINUED | OUTPATIENT
Start: 2024-06-15 | End: 2024-06-18

## 2024-06-15 RX ORDER — DIPHENHYDRAMINE HYDROCHLORIDE 50 MG/ML
25 INJECTION INTRAMUSCULAR; INTRAVENOUS EVERY 12 HOURS
Status: DISCONTINUED | OUTPATIENT
Start: 2024-06-16 | End: 2024-06-16

## 2024-06-15 RX ORDER — PREGABALIN 25 MG/1
25 CAPSULE ORAL AT BEDTIME
Status: DISCONTINUED | OUTPATIENT
Start: 2024-06-15 | End: 2024-06-15

## 2024-06-15 RX ORDER — PANTOPRAZOLE SODIUM 40 MG/1
40 TABLET, DELAYED RELEASE ORAL DAILY
Status: DISCONTINUED | OUTPATIENT
Start: 2024-06-15 | End: 2024-06-15

## 2024-06-15 RX ORDER — SUCRALFATE ORAL 1 G/10ML
1 SUSPENSION ORAL 4 TIMES DAILY PRN
COMMUNITY
End: 2024-06-28

## 2024-06-15 RX ORDER — CALCIUM CARBONATE 500 MG/1
1000 TABLET, CHEWABLE ORAL 4 TIMES DAILY PRN
Status: DISCONTINUED | OUTPATIENT
Start: 2024-06-15 | End: 2024-06-19 | Stop reason: HOSPADM

## 2024-06-15 RX ORDER — LEVOFLOXACIN 5 MG/ML
750 INJECTION, SOLUTION INTRAVENOUS AT BEDTIME
Status: DISCONTINUED | OUTPATIENT
Start: 2024-06-15 | End: 2024-06-15

## 2024-06-15 RX ORDER — POLYETHYLENE GLYCOL 3350 17 G/17G
17 POWDER, FOR SOLUTION ORAL DAILY PRN
Status: DISCONTINUED | OUTPATIENT
Start: 2024-06-15 | End: 2024-06-19 | Stop reason: HOSPADM

## 2024-06-15 RX ORDER — LIDOCAINE 40 MG/G
CREAM TOPICAL
Status: DISCONTINUED | OUTPATIENT
Start: 2024-06-15 | End: 2024-06-19 | Stop reason: HOSPADM

## 2024-06-15 RX ORDER — ALBUTEROL SULFATE 90 UG/1
2 AEROSOL, METERED RESPIRATORY (INHALATION) EVERY 6 HOURS PRN
Status: DISCONTINUED | OUTPATIENT
Start: 2024-06-15 | End: 2024-06-19 | Stop reason: HOSPADM

## 2024-06-15 RX ORDER — CARVEDILOL 12.5 MG/1
12.5 TABLET ORAL 2 TIMES DAILY WITH MEALS
Status: DISCONTINUED | OUTPATIENT
Start: 2024-06-15 | End: 2024-06-19 | Stop reason: HOSPADM

## 2024-06-15 RX ORDER — ENOXAPARIN SODIUM 100 MG/ML
80 INJECTION SUBCUTANEOUS 2 TIMES DAILY
Status: DISCONTINUED | OUTPATIENT
Start: 2024-06-15 | End: 2024-06-19 | Stop reason: HOSPADM

## 2024-06-15 RX ORDER — AMOXICILLIN 250 MG
1 CAPSULE ORAL 2 TIMES DAILY PRN
Status: DISCONTINUED | OUTPATIENT
Start: 2024-06-15 | End: 2024-06-19 | Stop reason: HOSPADM

## 2024-06-15 RX ORDER — OXYCODONE HCL 5 MG/5 ML
5-10 SOLUTION, ORAL ORAL EVERY 6 HOURS PRN
Status: DISCONTINUED | OUTPATIENT
Start: 2024-06-15 | End: 2024-06-16

## 2024-06-15 RX ORDER — NYSTATIN 100000 U/G
CREAM TOPICAL DAILY PRN
COMMUNITY

## 2024-06-15 RX ORDER — AMOXICILLIN 250 MG
2 CAPSULE ORAL 2 TIMES DAILY PRN
Status: DISCONTINUED | OUTPATIENT
Start: 2024-06-15 | End: 2024-06-15

## 2024-06-15 RX ORDER — DEXTROAMPHETAMINE SACCHARATE, AMPHETAMINE ASPARTATE, DEXTROAMPHETAMINE SULFATE AND AMPHETAMINE SULFATE 2.5; 2.5; 2.5; 2.5 MG/1; MG/1; MG/1; MG/1
20 TABLET ORAL DAILY
Status: DISCONTINUED | OUTPATIENT
Start: 2024-06-15 | End: 2024-06-19 | Stop reason: HOSPADM

## 2024-06-15 RX ORDER — ALPRAZOLAM 0.25 MG
0.5 TABLET ORAL 2 TIMES DAILY PRN
Status: DISCONTINUED | OUTPATIENT
Start: 2024-06-15 | End: 2024-06-19 | Stop reason: HOSPADM

## 2024-06-15 RX ORDER — AMOXICILLIN 250 MG
2 CAPSULE ORAL 2 TIMES DAILY PRN
Status: DISCONTINUED | OUTPATIENT
Start: 2024-06-15 | End: 2024-06-19 | Stop reason: HOSPADM

## 2024-06-15 RX ORDER — SUCRALFATE ORAL 1 G/10ML
1 SUSPENSION ORAL 4 TIMES DAILY
Status: DISCONTINUED | OUTPATIENT
Start: 2024-06-15 | End: 2024-06-19 | Stop reason: HOSPADM

## 2024-06-15 RX ORDER — ONDANSETRON 4 MG/1
8 TABLET, ORALLY DISINTEGRATING ORAL EVERY 8 HOURS PRN
Status: DISCONTINUED | OUTPATIENT
Start: 2024-06-15 | End: 2024-06-19 | Stop reason: HOSPADM

## 2024-06-15 RX ORDER — DIPHENHYDRAMINE HYDROCHLORIDE 50 MG/ML
25 INJECTION INTRAMUSCULAR; INTRAVENOUS ONCE
Status: COMPLETED | OUTPATIENT
Start: 2024-06-15 | End: 2024-06-15

## 2024-06-15 RX ADMIN — OXYCODONE HYDROCHLORIDE 10 MG: 5 SOLUTION ORAL at 22:05

## 2024-06-15 RX ADMIN — FENTANYL 1 PATCH: 25 PATCH TRANSDERMAL at 22:05

## 2024-06-15 RX ADMIN — OXYCODONE HYDROCHLORIDE 10 MG: 5 SOLUTION ORAL at 14:23

## 2024-06-15 RX ADMIN — ENOXAPARIN SODIUM 80 MG: 80 INJECTION SUBCUTANEOUS at 22:05

## 2024-06-15 RX ADMIN — DIPHENHYDRAMINE HYDROCHLORIDE 25 MG: 50 INJECTION INTRAMUSCULAR; INTRAVENOUS at 20:28

## 2024-06-15 RX ADMIN — CARVEDILOL 12.5 MG: 12.5 TABLET, FILM COATED ORAL at 22:14

## 2024-06-15 RX ADMIN — ENOXAPARIN SODIUM 80 MG: 80 INJECTION SUBCUTANEOUS at 10:27

## 2024-06-15 RX ADMIN — ALPRAZOLAM 0.5 MG: 0.25 TABLET ORAL at 10:27

## 2024-06-15 RX ADMIN — CARVEDILOL 6.25 MG: 6.25 TABLET, FILM COATED ORAL at 00:57

## 2024-06-15 RX ADMIN — DEXTROAMPHETAMINE SACCHARATE, AMPHETAMINE ASPARTATE, DEXTROAMPHETAMINE SULFATE AND AMPHETAMINE SULFATE 20 MG: 2.5; 2.5; 2.5; 2.5 TABLET ORAL at 11:49

## 2024-06-15 RX ADMIN — SUCRALFATE 1 G: 1 SUSPENSION ORAL at 22:05

## 2024-06-15 RX ADMIN — VANCOMYCIN HYDROCHLORIDE 2000 MG: 1 INJECTION, POWDER, LYOPHILIZED, FOR SOLUTION INTRAVENOUS at 20:48

## 2024-06-15 RX ADMIN — ONDANSETRON 8 MG: 8 TABLET, ORALLY DISINTEGRATING ORAL at 22:48

## 2024-06-15 RX ADMIN — ALPRAZOLAM 0.5 MG: 0.25 TABLET ORAL at 22:05

## 2024-06-15 RX ADMIN — OXYCODONE HYDROCHLORIDE 10 MG: 5 SOLUTION ORAL at 09:05

## 2024-06-15 RX ADMIN — CARVEDILOL 12.5 MG: 12.5 TABLET, FILM COATED ORAL at 10:26

## 2024-06-15 ASSESSMENT — ACTIVITIES OF DAILY LIVING (ADL)
ADLS_ACUITY_SCORE: 39

## 2024-06-15 NOTE — PROGRESS NOTES
[facility]    Medicine Progress Note - Hospitalist Service, GOLD TEAM     Date of Admission:  6/14/2024    Assessment & Plan   Parker Acevedo is a 53 year old man with history including RNY gastric bypass c/b malnutrition on TPN who was admitted on 6/15/2024. He presented to his PCP with fever 10 days prior to admission and S epidermidis was isolated from a single blood culture from that day (6/3/24). He presented several days after called back for this result. He remains admitted for IV antibiotics and further wokrup as below including echo and CVC removal.      Suspected CLABSI (S epidermidis)  In setting of recurrent CLABSI   In setting of indwelling Cm for TPN   Initially presented to clinic with fever 6/3, blood culture collected isolating S epidermidis. Labs did show leukocytosis that day. Called back to urge presentation to ED, and after many days presented to ED. Leukocytosis had resolved, last fever ~2 days PTA. Started on levofloxacin after collection of blood cultures per report though unable to see when this was started. There is a history of CLABSI. The patient reports switching home infusion companies to see if this helps reduce hospitalization.   - Repeat blood cultures x3 6/14/24 with 1 growing GPCs  - ID consulted, appreciate recs   - Started on Vancomycin stopped Levaquin   - Blood cx repeated 6/15 with one from CVC and one peripheral   - TTE ordered   - IR consult for removal of Cm- likely will need line holiday and replacement      - Tip culture ordered on cm    H/o Obesity s/p gastric bypass (2002) complicated by   H/o Severe malnutrition due to   Short gut syndrome   With chronic TPN   And venting J tube   Has been on chronic TPN for malnutrition, with inadequate intake in hospital with decreased PO intake, limited gut absorption. Electrolytes and hydration monitored during hospital stay and remained stable. RD followed patient  "during stay.  - Hold TPN  - RD consulted, appreciate recs      Hypokalemia  - RN driven replacement     Chronic pain  - Continue fentanyl patch q48h as prescribed PTA, oxycodone q6h as PTA  ADHD - PTA amphetamine-dextroamphetamine not currently ordered  Paroxysmal Afib - Continue carvedilol 12.5mg BID  DVT - Continue enoxaparin as prescribed PTA   ADHD: cont home adderall        Diet: Combination Diet Regular Diet Adult    DVT Prophylaxis: Enoxaparin (Lovenox) SQ  Sanchez Catheter: Not present  Lines: PRESENT             Cardiac Monitoring: None  Code Status: Full Code      Clinically Significant Risk Factors Present on Admission        # Hypokalemia: Lowest K = 3.1 mmol/L in last 2 days, will replace as needed   # Hypocalcemia: Lowest Ca = 8.3 mg/dL in last 2 days, will monitor and replace as appropriate      # Drug Induced Coagulation Defect: home medication list includes an anticoagulant medication                # Overweight: Estimated body mass index is 26.82 kg/m  as calculated from the following:    Height as of this encounter: 1.803 m (5' 11\").    Weight as of this encounter: 87.2 kg (192 lb 4.8 oz).       # Financial/Environmental Concerns:           Disposition Plan     Medically Ready for Discharge: Anticipated in 2-4 Days       The patient's care was discussed with the Attending Physician, Dr. Anderson, Bedside Nurse, Patient, and ID Consultant(s).    Kendal Ortiz PA-C  Hospitalist Service, [facility]  Securely message with [employer] (more info)  Text page via [employer] Paging/Directory   See signed in provider for up to date coverage information  ______________________________________________________________________    Interval History   Pt reports feeling quite well this morning, he has no new symptoms, was in waiting room for a long time glad to have a room. Is okay to be without his TPN for a while as he does eat and drink some. Discussed plan as above " and patient is in agreement. Has no other questions or concerns at this time.     Physical Exam   Vital Signs: Temp: 98.1  F (36.7  C) Temp src: Oral BP: 137/86 Pulse: 70   Resp: 18 SpO2: 98 % O2 Device: None (Room air)    Weight: 192 lbs 4.8 oz  Constitutional: sitting up in bed, appears comfortable, no apparent distress.  HEENT: Mucous membranes moist, no scleral icterus   Respiratory: No increased work of breathing, breathing comfortably on RA   Skin: normal skin color, texture, and no jaundice- azevedo on chest covered with clear dressing without surrounding erythema   Musculoskeletal: moving all extremities voluntarily during exam   Neuro: awake, alert, answering all questions appropriately during exam    Medical Decision Making       50 MINUTES SPENT BY ME on the date of service doing chart review, history, exam, documentation & further activities per the note.      Data     I have personally reviewed the following data over the past 24 hrs:    8.2  \   12.5 (L)   / 217     141 106 5.2 (L) /  103 (H)   3.1 (L) 23 0.77 \     ALT: 22 AST: 26 AP: 88 TBILI: 0.3   ALB: 3.5 TOT PROTEIN: 6.3 (L) LIPASE: N/A     Procal: N/A CRP: N/A Lactic Acid: 1.6         Imaging results reviewed over the past 24 hrs:   No results found for this or any previous visit (from the past 24 hour(s)).

## 2024-06-15 NOTE — ED PROVIDER NOTES
"ED Provider Note  [facility]      History     Chief Complaint   Patient presents with    Blood Infection     Blood cultures positive      HPI  Parker Acevedo is a 53 year old male PMH of DVT, cellulitis, HLD, jejunal intussusception, gram+ bacteremia who presents to the ER for evaluation of a blood infection.    Pt presents for positive blood cultures from 6/3/2024 which were drawn from his left arm on a stick. of note patient has indwelling PICC line in the left side of his neck which she has had for approximately 6 months. patient denies fevers but endorses chills. reports severe anaphylactoid reaction to penicillin as well as \"red man\" syndrome to vancomycin. otherwise patient denies nausea vomiting diarrhea cough shortness of breath or focal pain. patient takes home medications of liquid oxycodone Coreg and was Xanax at night.     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    Complication of anesthesia     Difficulty swallowing     Gastric ulcer, unspecified as acute or chronic, without mention of hemorrhage, perforation, or obstruction     Gastro-oesophageal reflux disease     Head injury     Hiatal hernia     Other bladder disorder     Other chronic pain     PONV (postoperative nausea and vomiting)     Severe malnutrition (H24)     TPN    Short gut syndrome     Tobacco abuse      Past Surgical History:   Procedure Laterality Date    AMPUTATION      APPENDECTOMY      BACK SURGERY  11/3/2014    curve in the spine    BIOPSY LYMPH NODE CERVICAL N/A 2/20/2015    Procedure: BIOPSY LYMPH NODE CERVICAL;  Surgeon: Baron Scanlon MD;  [facility]    CHOLECYSTECTOMY      COLONOSCOPY N/A 7/14/2021    " Procedure: COLONOSCOPY;  Surgeon: Jimbo Estrada MD;  Location: [facility] OR    COLONOSCOPY N/A 4/13/2022    Procedure: COLONOSCOPY;  Surgeon: Jimbo Estrada MD;  Location: [facility] OR    COLONOSCOPY N/A 9/19/2023    Procedure: Colonoscopy;  Surgeon: Jimbo Estrada MD;  Location: [facility] OR    DISCECTOMY, FUSION CERVICAL ANTERIOR ONE LEVEL, COMBINED N/A 2/15/2017    Procedure: COMBINED DISCECTOMY, FUSION CERVICAL ANTERIOR ONE LEVEL;  Surgeon: Darren Campos MD;  [facility]    ENDOSCOPIC INSERTION TUBE GASTROSTOMY  9/9/2013    Procedure: ENDOSCOPIC INSERTION TUBE GASTROSTOMY;;  Surgeon: Francis Vyas MD;  [facility]    ENDOSCOPIC ULTRASOUND UPPER GASTROINTESTINAL TRACT (GI)  4/29/2011    Procedure:ENDOSCOPIC ULTRASOUND UPPER GASTROINTESTINAL TRACT (GI); Both Procedures done Conjointly; Surgeon:NEREIDA HOUSER; [facility]    ENDOSCOPIC ULTRASOUND UPPER GASTROINTESTINAL TRACT (GI)  9/9/2013    Procedure: ENDOSCOPIC ULTRASOUND UPPER GASTROINTESTINAL TRACT (GI);  Endoscopic Ultrasound Guide Gastrostomy Tube Placement  C-arm;  Surgeon: Noe Lizarraga MD;  [facility]    ENDOSCOPIC ULTRASOUND UPPER GASTROINTESTINAL TRACT (GI) N/A 2/24/2021    Procedure: ENDOSCOPIC ULTRASOUND, ESOPHAGOSCOPY / UPPER GASTROINTESTINAL TRACT (GI), esophagastrogastroduodenoscopy;  Surgeon: Berny Bach MD;  [facility]    ENDOSCOPY  03/25/11    EGD, [facility]    ENDOSCOPY  08/04/09    Upper Endoscopy, [facility]    ENDOSCOPY  01/05/09    Upper Endoscopy, [facility]    ESOPHAGOSCOPY, GASTROSCOPY, DUODENOSCOPY (EGD), COMBINED  4/20/2011    Procedure:COMBINED ESOPHAGOSCOPY, GASTROSCOPY, DUODENOSCOPY (EGD); Surgeon:FRANCIS VYAS; [facility]    ESOPHAGOSCOPY, GASTROSCOPY, DUODENOSCOPY (EGD), COMBINED  6/15/2011    Procedure:COMBINED ESOPHAGOSCOPY, GASTROSCOPY, DUODENOSCOPY (EGD); Surgeon:FRANCIS VYAS; [facility]    ESOPHAGOSCOPY,  GASTROSCOPY, DUODENOSCOPY (EGD), COMBINED  6/12/2013    Procedure: COMBINED ESOPHAGOSCOPY, GASTROSCOPY, DUODENOSCOPY (EGD);;  Surgeon: Francis Vyas MD;  Location:  GI    ESOPHAGOSCOPY, GASTROSCOPY, DUODENOSCOPY (EGD), COMBINED  11/22/2013    Procedure: COMBINED ESOPHAGOSCOPY, GASTROSCOPY, DUODENOSCOPY (EGD);;  Surgeon: Francis Vyas MD;  [facility]    ESOPHAGOSCOPY, GASTROSCOPY, DUODENOSCOPY (EGD), COMBINED  4/30/2014    Procedure: COMBINED ESOPHAGOSCOPY, GASTROSCOPY, DUODENOSCOPY (EGD);  Surgeon: Francis Vyas MD;  Location:  GI    ESOPHAGOSCOPY, GASTROSCOPY, DUODENOSCOPY (EGD), COMBINED N/A 2/20/2015    Procedure: COMBINED ESOPHAGOSCOPY, GASTROSCOPY, DUODENOSCOPY (EGD), BIOPSY SINGLE OR MULTIPLE;  Surgeon: Baron Scanlon MD;  Location:  OR    ESOPHAGOSCOPY, GASTROSCOPY, DUODENOSCOPY (EGD), COMBINED N/A 9/30/2015    Procedure: COMBINED ESOPHAGOSCOPY, GASTROSCOPY, DUODENOSCOPY (EGD);  Surgeon: Francis Vyas MD;  Location:  GI    ESOPHAGOSCOPY, GASTROSCOPY, DUODENOSCOPY (EGD), COMBINED N/A 10/3/2019    Procedure: Upper Endoscopy;  Surgeon: Clif Morrow MD;  [facility]    GASTRECTOMY  6/22/2012    Procedure: GASTRECTOMY;  Open Approach, Excise Ulcers,Partial Gastrectomy, Esophagojejunostomy, Hiatal Hernia Repair, Extensive Lysis of Adhesions and Esaphagogastrodudenoscopy.;  Surgeon: Francis Vyas MD;  [facility]    GASTROJEJUNOSTOMY  08/26/09    Extensice enterolysis, partial resect. jejunum, part. resect gastric pouch, gastrojejunostomy anastomosis    HC ESOPH/GAS REFLUX TEST W NASAL IMPED ELECTRODE  8/5/2013    Procedure: ESOPHAGEAL IMPEDENCE FUNCTION TEST 1 HOUR OR LESS;  Surgeon: Halie Lang MD;  Location:  GI    HEAD & NECK SURGERY  2/15/2017    C5-C6    HERNIA REPAIR  2006    Umbilical hernia    HERNIORRHAPHY HIATAL  6/22/2012    Procedure: HERNIORRHAPHY HIATAL;;  Surgeon: Francis Vyas MD;  [facility]     HERNIORRHAPHY INGUINAL  11/22/2013    Procedure: HERNIORRHAPHY INGUINAL;;  Surgeon: Francis Vyas MD;  [facility]    INSERT PICC LINE Right 12/19/2019    Procedure: Picc Placement;  Surgeon: Per Dumont PA-C;  [facility]    INSERT PICC LINE Right 2/21/2020    Procedure: INSERTION, PICC;  Surgeon: Per Dumont PA-C;  [facility]    INSERT PORT VASCULAR ACCESS Right 12/19/2017    Procedure: INSERT PORT VASCULAR ACCESS;  Right Chest Port Placement ;  Surgeon: Lisandro Alejandro PA-C;  [facility]    INSERT PORT VASCULAR ACCESS Right 8/2/2018    Procedure: INSERT PORT VASCULAR ACCESS;  Place single lumen tunneled central venous access catheter;  Surgeon: Guy Jamil PA-C;  [facility]    IR CVC TUNNEL PLACEMENT > 5 YRS OF AGE  8/7/2019    IR CVC TUNNEL PLACEMENT > 5 YRS OF AGE  4/14/2020    IR CVC TUNNEL PLACEMENT > 5 YRS OF AGE  8/3/2020    IR CVC TUNNEL PLACEMENT > 5 YRS OF AGE  9/4/2020    IR CVC TUNNEL PLACEMENT > 5 YRS OF AGE  2/5/2021    IR CVC TUNNEL PLACEMENT > 5 YRS OF AGE  3/23/2021    IR CVC TUNNEL PLACEMENT > 5 YRS OF AGE  1/13/2022    IR CVC TUNNEL PLACEMENT > 5 YRS OF AGE  5/12/2022    IR CVC TUNNEL PLACEMENT > 5 YRS OF AGE  7/13/2022    IR CVC TUNNEL PLACEMENT > 5 YRS OF AGE  7/10/2023    IR CVC TUNNEL PLACEMENT > 5 YRS OF AGE  11/17/2023    IR CVC TUNNEL PLACEMENT > 5 YRS OF AGE  1/25/2024    IR CVC TUNNEL REMOVAL LEFT  6/7/2023    IR CVC TUNNEL REMOVAL LEFT  11/14/2023    IR CVC TUNNEL REMOVAL LEFT  1/22/2024    IR CVC TUNNEL REMOVAL RIGHT  10/1/2019    IR CVC TUNNEL REMOVAL RIGHT  7/30/2020    IR CVC TUNNEL REMOVAL RIGHT  9/2/2020    IR CVC TUNNEL REMOVAL RIGHT  2/3/2021    IR CVC TUNNEL REMOVAL RIGHT  3/19/2021    IR CVC TUNNEL REMOVAL RIGHT  1/10/2022    IR CVC TUNNEL REMOVAL RIGHT  5/9/2022    IR CVC TUNNEL REMOVAL RIGHT  7/8/2022    IR CVC TUNNEL REVISION LEFT  8/10/2022    IR CVC TUNNEL REVISION LEFT  9/19/2023    IR CVC TUNNEL REVISION  RIGHT  5/7/2021    IR FOLLOW UP VISIT OUTPATIENT  8/7/2019    IR PICC EXCHANGE LEFT  6/8/2023    IR PICC PLACEMENT > 5 YRS OF AGE  3/7/2019    IR PICC PLACEMENT > 5 YRS OF AGE  12/19/2019    IR PICC PLACEMENT > 5 YRS OF AGE  2/21/2020    IR PICC PLACEMENT > 5 YRS OF AGE  6/7/2023    LAPAROTOMY EXPLORATORY  11/22/2013    Procedure: LAPAROTOMY EXPLORATORY;  Exploratory Laparotomy, Upper Endoscopy, Left Inguinal Hernia Repair;  Surgeon: Francis Vyas MD;  [facility]    LAPAROTOMY EXPLORATORY N/A 9/30/2023    Procedure: Laparotomy exploratory, reduction of intussusception, resection of small bowel with primary anastamosis, upper endoscopy;  Surgeon: Delvis Mercado MD;  [facility]    ORTHOPEDIC SURGERY      PICC INSERTION Right 03/16/2017    5fr DL BioFlo PICC, 42cm (3cm external) in the R medial brachial vein w/ tip in the SVC RA junction.    PICC INSERTION Left 09/23/2017    5fr DL BioFlo PICC, 45cm (1cm external) in the L basilic vein w/ tip in the SVC RA junction.    PICC INSERTION Right 05/16/2019    5Fr - 43cm, Medial brachial vein, low SVC    PICC INSERTION Right 10/02/2019    5Fr - 43cm (2cm external), basilic vein, low SVC    SHAYLEE EN Y BOWEL  2003    SOFT TISSUE SURGERY      THORACIC SURGERY      TONSILLECTOMY      TRANSESOPHAGEAL ECHOCARDIOGRAM INTRAOPERATIVE N/A 1/8/2019    Procedure: TRANSESOPHAGEAL ECHOCARDIOGRAM INTRAOPERATIVE;  Surgeon: GENERIC ANESTHESIA PROVIDER;  [facility]    TRANSESOPHAGEAL ECHOCARDIOGRAM INTRAOPERATIVE N/A 7/7/2023    Procedure: Transesophageal echocardiogram in the OR;  Surgeon: GENERIC ANESTHESIA PROVIDER;  [facility]    TRANSESOPHAGEAL ECHOCARDIOGRAM INTRAOPERATIVE N/A 11/17/2023    Procedure: ECHOCARDIOGRAM,TRANSESOPHAGEAL,WITH ANESTHESIA;  Surgeon: GENERIC ANESTHESIA PROVIDER;  [facility]    ZZC GASTRIC BYPASS,OBESE<100CM SHAYLEE-EN-Y  2002    lost 300 pounds     albuterol (VENTOLIN HFA) 108 (90 Base) MCG/ACT inhaler  ALPRAZolam (XANAX) 0.5 MG  "tablet  amphetamine-dextroamphetamine (ADDERALL) 20 MG tablet  carvedilol (COREG) 6.25 MG tablet  cyanocobalamin (CYANOCOBALAMIN) 1000 MCG/ML injection  enoxaparin ANTICOAGULANT (LOVENOX) 80 MG/0.8ML syringe  [facility] PATIENT  fentaNYL (DURAGESIC) 25 mcg/hr 72 hr patch  Lidocaine (LIDOCARE) 4 % Patch  lipids plant base (CLINOLIPID) 20 % infusion  naloxone (NARCAN) 4 MG/0.1ML nasal spray  nystatin (MYCOSTATIN) 651877 UNIT/GM external cream  ondansetron (ZOFRAN ODT) 8 MG ODT tab  oxyCODONE (ROXICODONE) 5 MG/5ML solution  pantoprazole (PROTONIX) 40 MG EC tablet  polyethylene glycol (MIRALAX) 17 GM/Dose powder  pregabalin (LYRICA) 25 MG capsule  senna-docusate (SENOKOT-S/PERICOLACE) 8.6-50 MG tablet  sucralfate (CARAFATE) 1 GM/10ML suspension  Syringe/Needle, Disp, (B-D ECLIPSE SYRINGE) 27G X 1/2\" 1 ML MISC  vitamin D2 (ERGOCALCIFEROL) 94357 units (1250 mcg) capsule      Allergies   Allergen Reactions    Bactrim [Sulfamethoxazole-Trimethoprim] Rash    Penicillins Anaphylaxis     Please see Antimicrobial Management Team allergy assessment note 10/10/2018. Patient reported tolerating amoxicillin.  Tolerating cefepime and ceftriaxone without reaction 6/23    Ertapenem Nausea and Vomiting    Doxycycline Rash    Vancomycin Rash     Rash after receiving vancomycin 3/28/16 (infusion reaction?). Tolerated with slower infusion and diphenhydramine premed.  Tolerated 1250mg over 90minutes 7/2023.     Family History  Family History   Problem Relation Age of Onset    Gastrointestinal Disease Mother         Crohns disease    Anxiety Disorder Mother     Thyroid Disease Mother         Grave's disease    Cancer Father         ear cancer-skin cancer/melanoma    Breast Cancer Maternal Grandmother     Macular Degeneration Maternal Grandfather     Anxiety Disorder Sister     Diabetes Maternal Uncle     Breast Cancer Other     Hypertension No family hx of     Hyperlipidemia No family hx of     Cerebrovascular Disease No " "family hx of     Prostate Cancer No family hx of     Depression No family hx of     Anesthesia Reaction No family hx of     Asthma No family hx of     Osteoporosis No family hx of     Genetic Disorder No family hx of     Obesity No family hx of     Mental Illness No family hx of     Substance Abuse No family hx of     Glaucoma No family hx of      Social History   Social History     Tobacco Use    Smoking status: Light Smoker     Current packs/day: 0.50     Average packs/day: 0.5 packs/day for 3.0 years (1.5 ttl pk-yrs)     Types: Cigarettes    Smokeless tobacco: Never    Tobacco comments:     2/4/2021    smokes 3 cigarettes/day   Vaping Use    Vaping status: Never Used   Substance Use Topics    Alcohol use: No     Comment: quit 2002    Drug use: No      A medically appropriate review of systems was performed with pertinent positives and negatives noted in the HPI, and all other systems negative.    Physical Exam   BP: (!) 142/88  Pulse: 84  Temp: 98.1  F (36.7  C)  Resp: 18  Height: 180.3 cm (5' 11\")  Weight: 87.2 kg (192 lb 4.8 oz)  SpO2: 98 %  Physical Exam  chronically ill-appearing  left neck PICC line intact under sterile dressing  no signs of surrounding erythema or pus  gastric venting with mild amount of surrounding erythema and no drainage around abdominal port  breathing comfortably   ambulatory    ED Course, Procedures, & Data      Procedures            Results for orders placed or performed during the hospital encounter of 06/14/24   Comprehensive metabolic panel     Status: Abnormal   Result Value Ref Range    Sodium 141 135 - 145 mmol/L    Potassium 3.1 (L) 3.4 - 5.3 mmol/L    Carbon Dioxide (CO2) 23 22 - 29 mmol/L    Anion Gap 12 7 - 15 mmol/L    Urea Nitrogen 5.2 (L) 6.0 - 20.0 mg/dL    Creatinine 0.77 0.67 - 1.17 mg/dL    GFR Estimate >90 >60 mL/min/1.73m2    Calcium 8.3 (L) 8.6 - 10.0 mg/dL    Chloride 106 98 - 107 mmol/L    Glucose 103 (H) 70 - 99 mg/dL    Alkaline Phosphatase 88 40 - 150 U/L    " AST 26 0 - 45 U/L    ALT 22 0 - 70 U/L    Protein Total 6.3 (L) 6.4 - 8.3 g/dL    Albumin 3.5 3.5 - 5.2 g/dL    Bilirubin Total 0.3 <=1.2 mg/dL   Lactic acid whole blood with 1x repeat in 2 hr when >2     Status: Normal   Result Value Ref Range    Lactic Acid, Initial 1.6 0.7 - 2.0 mmol/L   Magnesium     Status: Normal   Result Value Ref Range    Magnesium 2.0 1.7 - 2.3 mg/dL   CBC with platelets and differential     Status: Abnormal   Result Value Ref Range    WBC Count 8.2 4.0 - 11.0 10e3/uL    RBC Count 4.25 (L) 4.40 - 5.90 10e6/uL    Hemoglobin 12.5 (L) 13.3 - 17.7 g/dL    Hematocrit 37.8 (L) 40.0 - 53.0 %    MCV 89 78 - 100 fL    MCH 29.4 26.5 - 33.0 pg    MCHC 33.1 31.5 - 36.5 g/dL    RDW 15.8 (H) 10.0 - 15.0 %    Platelet Count 217 150 - 450 10e3/uL    % Neutrophils 58 %    % Lymphocytes 29 %    % Monocytes 7 %    % Eosinophils 4 %    % Basophils 1 %    % Immature Granulocytes 1 %    NRBCs per 100 WBC 0 <1 /100    Absolute Neutrophils 4.8 1.6 - 8.3 10e3/uL    Absolute Lymphocytes 2.4 0.8 - 5.3 10e3/uL    Absolute Monocytes 0.6 0.0 - 1.3 10e3/uL    Absolute Eosinophils 0.3 0.0 - 0.7 10e3/uL    Absolute Basophils 0.1 0.0 - 0.2 10e3/uL    Absolute Immature Granulocytes 0.0 <=0.4 10e3/uL    Absolute NRBCs 0.0 10e3/uL   CBC with platelets differential     Status: Abnormal    Narrative    The following orders were created for panel order CBC with platelets differential.  Procedure                               Abnormality         Status                     ---------                               -----------         ------                     CBC with platelets and d...[[id number]]  Abnormal            Final result                 Please view results for these tests on the individual orders.     Medications   lactated ringers BOLUS 1,000 mL (1,000 mLs Intravenous $New Bag 6/14/24 2200)   levofloxacin (LEVAQUIN) infusion 750 mg (750 mg Intravenous $New Bag 6/14/24 2203)   carvedilol (COREG) tablet 6.25 mg (has no  administration in time range)   potassium chloride (KAYCIEL) solution 40 mEq (has no administration in time range)   oxyCODONE (ROXICODONE) solution 10 mg (10 mg Oral $Given 6/14/24 2203)   ALPRAZolam (XANAX) tablet 0.5 mg (0.5 mg Oral $Given 6/14/24 2215)     Labs Ordered and Resulted from Time of ED Arrival to Time of ED Departure   COMPREHENSIVE METABOLIC PANEL - Abnormal       Result Value    Sodium 141      Potassium 3.1 (*)     Carbon Dioxide (CO2) 23      Anion Gap 12      Urea Nitrogen 5.2 (*)     Creatinine 0.77      GFR Estimate >90      Calcium 8.3 (*)     Chloride 106      Glucose 103 (*)     Alkaline Phosphatase 88      AST 26      ALT 22      Protein Total 6.3 (*)     Albumin 3.5      Bilirubin Total 0.3     CBC WITH PLATELETS AND DIFFERENTIAL - Abnormal    WBC Count 8.2      RBC Count 4.25 (*)     Hemoglobin 12.5 (*)     Hematocrit 37.8 (*)     MCV 89      MCH 29.4      MCHC 33.1      RDW 15.8 (*)     Platelet Count 217      % Neutrophils 58      % Lymphocytes 29      % Monocytes 7      % Eosinophils 4      % Basophils 1      % Immature Granulocytes 1      NRBCs per 100 WBC 0      Absolute Neutrophils 4.8      Absolute Lymphocytes 2.4      Absolute Monocytes 0.6      Absolute Eosinophils 0.3      Absolute Basophils 0.1      Absolute Immature Granulocytes 0.0      Absolute NRBCs 0.0     LACTIC ACID WHOLE BLOOD WITH 1X REPEAT IN 2 HR WHEN >2 - Normal    Lactic Acid, Initial 1.6     MAGNESIUM - Normal    Magnesium 2.0     BLOOD CULTURE   BLOOD CULTURE   BLOOD CULTURE     No orders to display          Critical care was not performed.     Medical Decision Making  The patient's presentation was of high complexity (an acute health issue posing potential threat to life or bodily function).    The patient's evaluation involved:  review of external note(s) from 1 sources (outpatient clinic)  review of 1 test result(s) ordered prior to this encounter (blood culture)  ordering and/or review of 2 test(s) in this  encounter (see separate area of note for details)  discussion of management or test interpretation with another health professional (triage hospitalist)    The patient's management necessitated high risk (a decision regarding hospitalization).    Assessment & Plan     bacteremia in the setting of patient's complex medical history and indwelling PICC line. will redraw peripheral blood cultures as well as one from PICC line I have reviewed the blood culture sensitivities and will initiate IV Levaquin based on the sensitivities and patient's allergies. will rule out electrolyte abnormality or acute organ dysfunction. admit to medicine for IV antibiotics.    1045pm: Labs show a mild hypokalemia .  Will replete oral potassium   mildly anemic but otherwise will admit for bacteremia    I have reviewed the nursing notes. I have reviewed the findings, diagnosis, plan and need for follow up with the patient.    New Prescriptions    No medications on file       Final diagnoses:   Bacteremia   History of Celestino-en-Y gastric bypass   Hypokalemia     This part of the medical record was transcribed by Estelle Renner, Medical Scribe, from a dictation done by Willian Linda MD.     Willian Linda MD  [facility] EMERGENCY DEPARTMENT  6/14/2024     Willian Linda MD  06/14/24 2255       Willian Linda MD  06/14/24 0238     2 seconds or less

## 2024-06-15 NOTE — ED TRIAGE NOTES
Pt ambulatory to triage with c/o positive blood cultures drawn on Monday or Tuesday per pt report. Pt was instructed to come in to ER on Tuesday      Triage Assessment (Adult)       Row Name 06/14/24 2102          Triage Assessment    Airway WDL WDL        Respiratory WDL    Respiratory WDL WDL        Skin Circulation/Temperature WDL    Skin Circulation/Temperature WDL WDL        Cardiac WDL    Cardiac WDL WDL        Peripheral/Neurovascular WDL    Peripheral Neurovascular WDL WDL        Cognitive/Neuro/Behavioral WDL    Cognitive/Neuro/Behavioral WDL WDL

## 2024-06-15 NOTE — PROCEDURES
[facility]    Procedure: IR Procedure Note    Date/Time: 6/15/2024 2:49 PM    Performed by: Lisandro Wren MD  Authorized by: Lisandro Wren MD  IR Fellow Physician: KACIE Wren MD  Other(s) attending procedure: PERNELL Farrell MD      UNIVERSAL PROTOCOL   Site Marked: NA  Prior Images Obtained and Reviewed:  Yes  Required items: Required blood products, implants, devices and special equipment available    Patient identity confirmed:  Verbally with patient, arm band, provided demographic data and hospital-assigned identification number  Patient was reevaluated immediately before administering moderate or deep sedation or anesthesia  Confirmation Checklist:  Patient's identity using two indicators, relevant allergies, procedure was appropriate and matched the consent or emergent situation and correct equipment/implants were available  Time out: Immediately prior to the procedure a time out was called    Universal Protocol: the Joint Commission Universal Protocol was followed    Preparation: Patient was prepped and draped in usual sterile fashion    ESBL (mL):  1     ANESTHESIA    Anesthesia:  Local infiltration  Local Anesthetic:  Lidocaine 1% without epinephrine  Anesthetic Total (mL):  3    See dictated procedure note for full details.  Findings: See dictation.    Specimens: other (see comment)    Complications: None    Condition: Stable    Plan: No postprocedure orders. No bedrest required.      PROCEDURE  Describe Procedure: Removal of left sided tunneled central venous catheter. Tip sent for culture. Sterile dressing applied.  Patient Tolerance:  Patient tolerated the procedure well with no immediate complications  Length of time physician/provider present for 1:1 monitoring during sedation: 0

## 2024-06-15 NOTE — H&P
[facility]    History and Physical - Hospitalist Service       Date of Admission:  6/14/2024    Assessment & Plan      Parker Acevedo is a 53 year old man with history including RNY gastric bypass c/b malnutrition on TPN who was admitted on 6/15/2024. He presented to his PCP with fever 10 days prior to admission and S epidermidis was isolated from a single blood culture from that day (6/3/24). He presented several days after called back for this result.    Suspected CLABSI (S epidermidis)  In setting of recurrent CLABSI   In setting of indwelling Cm for TPN   Initially presented to clinic with fever, blood culture collected isolating S epidermidis. Labs did show leukocytosis that day. Called back to urge presentation to ED, and after many days presented to ED. Leukocytosis had resolved, last fever ~2 days PTA. Started on levofloxacin after collection of blood cultures. There is a history of CLABSI. The patient reports switching home infusion companies to see if this helps reduce hospitalization.   - Repeat blood cultures x3 6/14/24 pending  - Continue levofloxacin q24h  - Would consult ID in the morning to guide approach to treatment  - Will need line removal, possibly 'holiday,' and replacement      H/o Obesity s/p gastric bypass (2002) complicated by   H/o Severe malnutrition due to   Short gut syndrome   With chronic TPN   And venting J tube   Has been on chronic TPN for malnutrition, with inadequate intake in hospital with decreased PO intake, limited gut absorption. Electrolytes and hydration monitored during hospital stay and remained stable. RD followed patient during stay.  - Hold TPN  - Continue PPI     Hypokalemia  - RN driven replacement        Chronic medical conditions/comorbidities   Chronic pain  - Continue fentanyl patch q48h as prescribed PTA, oxycodone q6h as PTA  ADHD - PTA amphetamine-dextroamphetamine not currently ordered  Paroxysmal Afib -  "Continue carvedilol 12.5mg BID  DVT - Continue enoxaparin as prescribed PTA         Diet:  reg  DVT Prophylaxis: Enoxaparin (Lovenox) SQ  Sanchez Catheter: Not present  Lines: PRESENT             Cardiac Monitoring: None  Code Status:  Full code    Clinically Significant Risk Factors Present on Admission        # Hypokalemia: Lowest K = 3.1 mmol/L in last 2 days, will replace as needed   # Hypocalcemia: Lowest Ca = 8.3 mg/dL in last 2 days, will monitor and replace as appropriate        # Drug Induced Coagulation Defect: home medication list includes an anticoagulant medication                # Overweight: Estimated body mass index is 26.82 kg/m  as calculated from the following:    Height as of this encounter: 1.803 m (5' 11\").    Weight as of this encounter: 87.2 kg (192 lb 4.8 oz).         # Financial/Environmental Concerns:           Disposition Plan     Medically Ready for Discharge: Anticipated in 2-4 Days           Oleg Reddy DO  Hospitalist Service  [facility]  Securely message with [employer] (more info)  Text page via [facility] Paging/Directory     ______________________________________________________________________    Chief Complaint   Bacterial infection    History is obtained from the patient    History of Present Illness   Parker Acevedo is a 53 year old man who is on chronic TPN for malnutrition after complicated abdominal surgical history. He visited his PCP on 6/3 and reported fever, but refused the encouragement to go to the ED that day. He was called with a positive result and reported to the ED 6/14, several days later.     He has been feeling in his usual state of health except for recurrent fever. For the past two weeks he has noticed \"24-32\" hours of fevers after any use of his PICC line. When not in use, there is no fever. There is associated headache and chills.    He has had some generalized joint pains but nothing much worse than usual. He has " hardware of some kind in his cervical spine and he denies neck pain.    Presents with his wife who affirms the history.        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    Complication of anesthesia     Difficulty swallowing     Gastric ulcer, unspecified as acute or chronic, without mention of hemorrhage, perforation, or obstruction     Gastro-oesophageal reflux disease     Head injury     Hiatal hernia     Other bladder disorder     Other chronic pain     PONV (postoperative nausea and vomiting)     Severe malnutrition (H24)     TPN    Short gut syndrome     Tobacco abuse        Past Surgical History   Past Surgical History:   Procedure Laterality Date    AMPUTATION      APPENDECTOMY      BACK SURGERY  11/3/2014    curve in the spine    BIOPSY LYMPH NODE CERVICAL N/A 2/20/2015    Procedure: BIOPSY LYMPH NODE CERVICAL;  Surgeon: Baron Scanlon MD;  [facility]    CHOLECYSTECTOMY      COLONOSCOPY N/A 7/14/2021    Procedure: COLONOSCOPY;  Surgeon: Jimbo Estrada MD;  Location: [facility] OR    COLONOSCOPY N/A 4/13/2022    Procedure: COLONOSCOPY;  Surgeon: Jimbo Estrada MD;  Location: [facility] OR    COLONOSCOPY N/A 9/19/2023    Procedure: Colonoscopy;  Surgeon: Jimbo Estrada MD;  Location: [facility] OR    DISCECTOMY, FUSION CERVICAL ANTERIOR ONE LEVEL, COMBINED N/A 2/15/2017    Procedure: COMBINED DISCECTOMY, FUSION CERVICAL ANTERIOR ONE LEVEL;  Surgeon: Darren Campos MD;  [facility]    ENDOSCOPIC INSERTION TUBE GASTROSTOMY  9/9/2013    Procedure: ENDOSCOPIC INSERTION TUBE GASTROSTOMY;;  Surgeon: Francis Vyas MD;  [facility]    ENDOSCOPIC ULTRASOUND UPPER GASTROINTESTINAL TRACT (GI)   4/29/2011    Procedure:ENDOSCOPIC ULTRASOUND UPPER GASTROINTESTINAL TRACT (GI); Both Procedures done Conjointly; Surgeon:NEREIDA HOUSER; [facility]    ENDOSCOPIC ULTRASOUND UPPER GASTROINTESTINAL TRACT (GI)  9/9/2013    Procedure: ENDOSCOPIC ULTRASOUND UPPER GASTROINTESTINAL TRACT (GI);  Endoscopic Ultrasound Guide Gastrostomy Tube Placement  C-arm;  Surgeon: Noe Lizarraga MD;  [facility]    ENDOSCOPIC ULTRASOUND UPPER GASTROINTESTINAL TRACT (GI) N/A 2/24/2021    Procedure: ENDOSCOPIC ULTRASOUND, ESOPHAGOSCOPY / UPPER GASTROINTESTINAL TRACT (GI), esophagastrogastroduodenoscopy;  Surgeon: Berny Bach MD;  [facility]    ENDOSCOPY  03/25/11    EGD, [facility]    ENDOSCOPY  08/04/09    Upper Endoscopy, [facility]    ENDOSCOPY  01/05/09    Upper Endoscopy, [facility]    ESOPHAGOSCOPY, GASTROSCOPY, DUODENOSCOPY (EGD), COMBINED  4/20/2011    Procedure:COMBINED ESOPHAGOSCOPY, GASTROSCOPY, DUODENOSCOPY (EGD); Surgeon:BLU VYAS; [facility]    ESOPHAGOSCOPY, GASTROSCOPY, DUODENOSCOPY (EGD), COMBINED  6/15/2011    Procedure:COMBINED ESOPHAGOSCOPY, GASTROSCOPY, DUODENOSCOPY (EGD); Surgeon:BLU VYAS; [facility]    ESOPHAGOSCOPY, GASTROSCOPY, DUODENOSCOPY (EGD), COMBINED  6/12/2013    Procedure: COMBINED ESOPHAGOSCOPY, GASTROSCOPY, DUODENOSCOPY (EGD);;  Surgeon: Blu Vyas MD;  [facility]    ESOPHAGOSCOPY, GASTROSCOPY, DUODENOSCOPY (EGD), COMBINED  11/22/2013    Procedure: COMBINED ESOPHAGOSCOPY, GASTROSCOPY, DUODENOSCOPY (EGD);;  Surgeon: Blu Vyas MD;  [facility]    ESOPHAGOSCOPY, GASTROSCOPY, DUODENOSCOPY (EGD), COMBINED  4/30/2014    Procedure: COMBINED ESOPHAGOSCOPY, GASTROSCOPY, DUODENOSCOPY (EGD);  Surgeon: Blu Vyas MD;  [facility]    ESOPHAGOSCOPY, GASTROSCOPY, DUODENOSCOPY (EGD), COMBINED N/A 2/20/2015    Procedure: COMBINED ESOPHAGOSCOPY, GASTROSCOPY, DUODENOSCOPY (EGD), BIOPSY SINGLE  OR MULTIPLE;  Surgeon: Baron Scanlon MD;  [facility]    ESOPHAGOSCOPY, GASTROSCOPY, DUODENOSCOPY (EGD), COMBINED N/A 9/30/2015    Procedure: COMBINED ESOPHAGOSCOPY, GASTROSCOPY, DUODENOSCOPY (EGD);  Surgeon: Francis Vyas MD;  Location:  GI    ESOPHAGOSCOPY, GASTROSCOPY, DUODENOSCOPY (EGD), COMBINED N/A 10/3/2019    Procedure: Upper Endoscopy;  Surgeon: Clif Morrow MD;  [facility]    GASTRECTOMY  6/22/2012    Procedure: GASTRECTOMY;  Open Approach, Excise Ulcers,Partial Gastrectomy, Esophagojejunostomy, Hiatal Hernia Repair, Extensive Lysis of Adhesions and Esaphagogastrodudenoscopy.;  Surgeon: Francis Vyas MD;  [facility]    GASTROJEJUNOSTOMY  08/26/09    Extensice enterolysis, partial resect. jejunum, part. resect gastric pouch, gastrojejunostomy anastomosis    HC ESOPH/GAS REFLUX TEST W NASAL IMPED ELECTRODE  8/5/2013    Procedure: ESOPHAGEAL IMPEDENCE FUNCTION TEST 1 HOUR OR LESS;  Surgeon: Halie Lang MD;  Location:  GI    HEAD & NECK SURGERY  2/15/2017    C5-C6    HERNIA REPAIR  2006    Umbilical hernia    HERNIORRHAPHY HIATAL  6/22/2012    Procedure: HERNIORRHAPHY HIATAL;;  Surgeon: Francis Vyas MD;  [facility]    HERNIORRHAPHY INGUINAL  11/22/2013    Procedure: HERNIORRHAPHY INGUINAL;;  Surgeon: Francis Vyas MD;  [facility]    INSERT PICC LINE Right 12/19/2019    Procedure: Picc Placement;  Surgeon: Per Dumont PA-C;  [facility]    INSERT PICC LINE Right 2/21/2020    Procedure: INSERTION, PICC;  Surgeon: Per Dumont PA-C;  [facility]    INSERT PORT VASCULAR ACCESS Right 12/19/2017    Procedure: INSERT PORT VASCULAR ACCESS;  Right Chest Port Placement ;  Surgeon: Lisandro Alejandro PA-C;  [facility]    INSERT PORT VASCULAR ACCESS Right 8/2/2018    Procedure: INSERT PORT VASCULAR ACCESS;  Place single lumen tunneled central venous access catheter;  Surgeon: Guy Jamil PA-C;   [facility]    IR CVC TUNNEL PLACEMENT > 5 YRS OF AGE  8/7/2019    IR CVC TUNNEL PLACEMENT > 5 YRS OF AGE  4/14/2020    IR CVC TUNNEL PLACEMENT > 5 YRS OF AGE  8/3/2020    IR CVC TUNNEL PLACEMENT > 5 YRS OF AGE  9/4/2020    IR CVC TUNNEL PLACEMENT > 5 YRS OF AGE  2/5/2021    IR CVC TUNNEL PLACEMENT > 5 YRS OF AGE  3/23/2021    IR CVC TUNNEL PLACEMENT > 5 YRS OF AGE  1/13/2022    IR CVC TUNNEL PLACEMENT > 5 YRS OF AGE  5/12/2022    IR CVC TUNNEL PLACEMENT > 5 YRS OF AGE  7/13/2022    IR CVC TUNNEL PLACEMENT > 5 YRS OF AGE  7/10/2023    IR CVC TUNNEL PLACEMENT > 5 YRS OF AGE  11/17/2023    IR CVC TUNNEL PLACEMENT > 5 YRS OF AGE  1/25/2024    IR CVC TUNNEL REMOVAL LEFT  6/7/2023    IR CVC TUNNEL REMOVAL LEFT  11/14/2023    IR CVC TUNNEL REMOVAL LEFT  1/22/2024    IR CVC TUNNEL REMOVAL RIGHT  10/1/2019    IR CVC TUNNEL REMOVAL RIGHT  7/30/2020    IR CVC TUNNEL REMOVAL RIGHT  9/2/2020    IR CVC TUNNEL REMOVAL RIGHT  2/3/2021    IR CVC TUNNEL REMOVAL RIGHT  3/19/2021    IR CVC TUNNEL REMOVAL RIGHT  1/10/2022    IR CVC TUNNEL REMOVAL RIGHT  5/9/2022    IR CVC TUNNEL REMOVAL RIGHT  7/8/2022    IR CVC TUNNEL REVISION LEFT  8/10/2022    IR CVC TUNNEL REVISION LEFT  9/19/2023    IR CVC TUNNEL REVISION RIGHT  5/7/2021    IR FOLLOW UP VISIT OUTPATIENT  8/7/2019    IR PICC EXCHANGE LEFT  6/8/2023    IR PICC PLACEMENT > 5 YRS OF AGE  3/7/2019    IR PICC PLACEMENT > 5 YRS OF AGE  12/19/2019    IR PICC PLACEMENT > 5 YRS OF AGE  2/21/2020    IR PICC PLACEMENT > 5 YRS OF AGE  6/7/2023    LAPAROTOMY EXPLORATORY  11/22/2013    Procedure: LAPAROTOMY EXPLORATORY;  Exploratory Laparotomy, Upper Endoscopy, Left Inguinal Hernia Repair;  Surgeon: Francis Vyas MD;  [facility]    LAPAROTOMY EXPLORATORY N/A 9/30/2023    Procedure: Laparotomy exploratory, reduction of intussusception, resection of small bowel with primary anastamosis, upper endoscopy;  Surgeon: Delvis Mercado MD;  [facility]    ORTHOPEDIC SURGERY       "PICC INSERTION Right 03/16/2017    5fr DL BioFlo PICC, 42cm (3cm external) in the R medial brachial vein w/ tip in the SVC RA junction.    PICC INSERTION Left 09/23/2017    5fr DL BioFlo PICC, 45cm (1cm external) in the L basilic vein w/ tip in the SVC RA junction.    PICC INSERTION Right 05/16/2019    5Fr - 43cm, Medial brachial vein, low SVC    PICC INSERTION Right 10/02/2019    5Fr - 43cm (2cm external), basilic vein, low SVC    SHAYLEE EN Y BOWEL  2003    SOFT TISSUE SURGERY      THORACIC SURGERY      TONSILLECTOMY      TRANSESOPHAGEAL ECHOCARDIOGRAM INTRAOPERATIVE N/A 1/8/2019    Procedure: TRANSESOPHAGEAL ECHOCARDIOGRAM INTRAOPERATIVE;  Surgeon: GENERIC ANESTHESIA PROVIDER;  [facility]    TRANSESOPHAGEAL ECHOCARDIOGRAM INTRAOPERATIVE N/A 7/7/2023    Procedure: Transesophageal echocardiogram in the OR;  Surgeon: GENERIC ANESTHESIA PROVIDER;  [facility]    TRANSESOPHAGEAL ECHOCARDIOGRAM INTRAOPERATIVE N/A 11/17/2023    Procedure: ECHOCARDIOGRAM,TRANSESOPHAGEAL,WITH ANESTHESIA;  Surgeon: GENERIC ANESTHESIA PROVIDER;  [facility]    ZZC GASTRIC BYPASS,OBESE<100CM SHAYLEE-EN-Y  2002    lost 300 pounds       Prior to Admission Medications   Prior to Admission Medications   Prescriptions Last Dose Informant Patient Reported? Taking?   ALPRAZolam (XANAX) 0.5 MG tablet   No No   Sig: TAKE ONE TABLET BY MOUTH TWICE A DAY AS NEEDED FOR SLEEP OR ANXIETY   [facility] MANAGED PATIENT  Spouse/Significant Other No No   Sig: Contact [facility] for patient specific medication information at [phone] on admission and discharge from the hospital.  Phones are answered 24 hours a day 7 days a week 365 days a year.    Providers - Choose \"CONTINUE HOME MED (no script)\" at discharge if patient treatment with home infusion will continue.   Lidocaine (LIDOCARE) 4 % Patch   Yes No   Sig: Place 1 patch onto the skin daily as needed for moderate pain To prevent lidocaine toxicity, patient should be " "patch free for 12 hrs daily.   Syringe/Needle, Disp, (B-D ECLIPSE SYRINGE) 27G X 1/2\" 1 ML MISC   No No   Si Device every 30 days   albuterol (VENTOLIN HFA) 108 (90 Base) MCG/ACT inhaler  Spouse/Significant Other No No   Sig: Inhale 2 puffs into the lungs every 6 hours as needed   amphetamine-dextroamphetamine (ADDERALL) 20 MG tablet   No No   Sig: TAKE ONE TABLET BY MOUTH ONCE DAILY   carvedilol (COREG) 6.25 MG tablet   No No   Sig: TAKE 2 TABLETS (12.5 MG) BY MOUTH 2 TIMES DAILY WITH MEALS   cyanocobalamin (CYANOCOBALAMIN) 1000 MCG/ML injection   No No   Sig: Inject 1 mL (1,000 mcg) into the muscle every 30 days   enoxaparin ANTICOAGULANT (LOVENOX) 80 MG/0.8ML syringe   No No   Sig: Inject 0.8 mLs (80 mg) Subcutaneous 2 times daily INJECT THE CONTENTS OF ONE SYRINGE (80MG) UNDER THE SKIN TWO TIMES A DAY   fentaNYL (DURAGESIC) 25 mcg/hr 72 hr patch   No No   Sig: Place 1 patch onto the skin every 48 hours OK to fill 24 with start 24. 30 day supply for chronic pain.   lipids plant base (CLINOLIPID) 20 % infusion   No No   Sig: Inject 250 mLs into the vein every 24 hours   naloxone (NARCAN) 4 MG/0.1ML nasal spray   No No   Sig: Spray 1 spray (4 mg) into one nostril alternating nostrils as needed for opioid reversal every 2-3 minutes until assistance arrives   nystatin (MYCOSTATIN) 192458 UNIT/GM external cream   No No   Sig: Apply topically 2 times daily   ondansetron (ZOFRAN ODT) 8 MG ODT tab   No No   Sig: DISSOLVE ONE TABLET BY MOUTH EVERY 8 HOURS AS NEEDED FOR NAUSEA   oxyCODONE (ROXICODONE) 5 MG/5ML solution   No No   Sig: Take 5-10 mLs (5-10 mg) by mouth every 4 hours as needed for moderate to severe pain Max of 40mg/day. OK to fill 24 with start 24. 30 day supply for chronic pain.   pantoprazole (PROTONIX) 40 MG EC tablet   No No   Sig: TAKE 1 TABLET (40 MG) BY MOUTH DAILY   polyethylene glycol (MIRALAX) 17 GM/Dose powder   Yes No   Sig: Take 17 g by mouth as needed for constipation "   pregabalin (LYRICA) 25 MG capsule   No No   Sig: Take 1 capsule (25 mg) by mouth at bedtime   senna-docusate (SENOKOT-S/PERICOLACE) 8.6-50 MG tablet   No No   Sig: Take 2 tablets by mouth 2 times daily as needed for constipation   sucralfate (CARAFATE) 1 GM/10ML suspension   No No   Sig: Take 10 mLs (1 g) by mouth 4 times daily   vitamin D2 (ERGOCALCIFEROL) 20236 units (1250 mcg) capsule   No No   Sig: Take 1 capsule (50,000 Units) by mouth once a week      Facility-Administered Medications: None        Family History   I have reviewed this patient's family history and updated it with pertinent information if needed.  Family History   Problem Relation Age of Onset    Gastrointestinal Disease Mother         Crohns disease    Anxiety Disorder Mother     Thyroid Disease Mother         Grave's disease    Cancer Father         ear cancer-skin cancer/melanoma    Breast Cancer Maternal Grandmother     Macular Degeneration Maternal Grandfather     Anxiety Disorder Sister     Diabetes Maternal Uncle     Breast Cancer Other     Hypertension No family hx of     Hyperlipidemia No family hx of     Cerebrovascular Disease No family hx of     Prostate Cancer No family hx of     Depression No family hx of     Anesthesia Reaction No family hx of     Asthma No family hx of     Osteoporosis No family hx of     Genetic Disorder No family hx of     Obesity No family hx of     Mental Illness No family hx of     Substance Abuse No family hx of     Glaucoma No family hx of         Physical Exam   Vital Signs: Temp: 98.1  F (36.7  C) Temp src: Oral BP: 137/86 Pulse: 70   Resp: 18 SpO2: 98 % O2 Device: None (Room air)    Weight: 192 lbs 4.8 oz    Gen: Awake and alert, appears comfortable, appears stated age.  CV: Regular rhythm, normal rate, no murmur heard; extremities warm and well perfused. PICC left chest is accessed.  Pulm: Normal work of breathing, lungs clear to auscultation, no crackles or wheezing.  GI: Nontender, nondistended,  soft. +bowel sounds. Extensive surgical scars. PEG tube capped, site appears clean.  Skin: Warm, dry, no jaundice.  Neuro: AO, speech normal, moves all extremities symmetrically.  Psych: Mood is good, affect is congruent.      Medical Decision Making       70 MINUTES SPENT BY ME on the date of service doing chart review, history, exam, documentation & further activities per the note.      Data     I have personally reviewed the following data over the past 24 hrs:    8.2  \   12.5 (L)   / 217     141 106 5.2 (L) /  103 (H)   3.1 (L) 23 0.77 \     ALT: 22 AST: 26 AP: 88 TBILI: 0.3   ALB: 3.5 TOT PROTEIN: 6.3 (L) LIPASE: N/A     Procal: N/A CRP: N/A Lactic Acid: 1.6         Imaging results reviewed over the past 24 hrs:   No results found for this or any previous visit (from the past 24 hour(s)).

## 2024-06-15 NOTE — PROVIDER NOTIFICATION
Notified PA at 1115 AM regarding critical results read back.      Spoke with: Kendal Ortiz PA-C    Orders were not obtained.    Comments: Blood Cultures 6/14 2157 growing Gram + Cocci in clusters.

## 2024-06-15 NOTE — CONSULTS
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 Has tolerated slower infusion with diphenhydramine.  Doxycycline - causes rash  Ertapenem - nausea, vomiting  TMP-SMX, causes rash    Discussion:  54 yo M with MSSE bacteremia, likely 2/2 CLABSI. Suggest to additional blood cultures now (from CVC and peripheral), and remove Cm catheter, send tip culture. Daily blood cultures until negative at least for 48 hours. Follow identification and susceptibility of organism, if isolate from 6/14 is methicillin-susceptible as previous isolate 6/6, then plan to de-escalate to iv cefazolin (which he has tolerated in past w/o side effects reported). Would also obtain TTE to rule out endocarditis. No clinical signs of secondary infections such as involving joint on today's exam. At least 72 hours of  line holiday once removal of CVC and clearance of bacteremia (whichever is latter).     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 Would check CK level at baseline, and then weekly if were to continue iv daptomycin.   Follow up blood cultures 6/14, for identification/susceptibility  If finalized as MSSE then plan to de-escalate to iv cefazolin  Wait for bacteremia clearance before line replacement, at least 72 hours  Continue to monitor clinically for signs of secondary infections     Recommendations discussed with primary team.    Thank you for this consult. ID team will continue to follow this patient. Please reach out if any questions or concerns.     Total time spent during this encounter (chart review, documentation, MDM, coordination of care): 80 minutes     Luisa Dominique MD  Infectious Diseases Staff Physician  [facility]   Date: 6/15/2024       History of Present Illness:     Parker Acevedo is a 54 yo M with PMHx of Celestino-en-y gastric bypass c/b short gut syndrome, now TPN-dependent via CVC, h/o DVT, hx of recurrent CLABSI, who presented to ER 6/14/2024 due to positive blood cultures 6/6 on outpatient fever work up.     Per patient, has been having fever, chills especially with use of CVC for TPN since past ~3 weeks. Also notes irriation around catheter insertion site since past one week. He was seen as outpatient primary care 6/6, whereby blood cultures were obtained which resulted positive, with growth of MSSE. Was told to go to ER then, and arrived on 6/14 and being admitted for bacteremia management. Afebrile, relatively stable vitals. Repeat blood cultures GPC in clusters, pending verigene. Started on levofloxacin based on previous blood cultures (Jan, 2024). ID consult is requested for antibiotic and bacteremia management.     SHx:  Lives with wife. Recently moved to own house ~3 weeks ago, prior to that was staying in hotel. Has 2 pet dogs (healthy corgi). Current  smoking.          Review of Systems:     Complete 12 point review of systems negative except as noted in HPI.          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    Complication of anesthesia     Difficulty swallowing     Gastric ulcer, unspecified as acute or chronic, without mention of hemorrhage, perforation, or obstruction     Gastro-oesophageal reflux disease     Head injury     Hiatal hernia     Other bladder disorder     Other chronic pain     PONV (postoperative nausea and vomiting)     Severe malnutrition (H24)     TPN    Short gut syndrome     Tobacco abuse          Allergies:      Allergies   Allergen Reactions    Bactrim [Sulfamethoxazole-Trimethoprim] Rash    Penicillins Anaphylaxis     Please see Antimicrobial Management Team allergy assessment note 10/10/2018. Patient reported tolerating amoxicillin.  Tolerating cefepime and ceftriaxone without reaction 6/23    Ertapenem Nausea and Vomiting    Doxycycline Rash    Vancomycin Rash     Rash after receiving vancomycin 3/28/16 (infusion reaction?). Tolerated with slower infusion and diphenhydramine premed.  Tolerated 1250mg over 90minutes 7/2023.          Family History:   Reviewed and noncontributory.   Family History   Problem Relation Age of Onset    Gastrointestinal Disease Mother         Crohns disease    Anxiety Disorder Mother     Thyroid Disease Mother         Grave's disease    Cancer Father         ear cancer-skin cancer/melanoma    Breast Cancer Maternal Grandmother     Macular Degeneration Maternal Grandfather     Anxiety Disorder Sister     Diabetes Maternal Uncle     Breast Cancer Other      Hypertension No family hx of     Hyperlipidemia No family hx of     Cerebrovascular Disease No family hx of     Prostate Cancer No family hx of     Depression No family hx of     Anesthesia Reaction No family hx of     Asthma No family hx of     Osteoporosis No family hx of     Genetic Disorder No family hx of     Obesity No family hx of     Mental Illness No family hx of     Substance Abuse No family hx of     Glaucoma No family hx of           Social History:     Social History     Socioeconomic History    Marital status:      Spouse name: Rose    Number of children: 1    Years of education: Not on file    Highest education level: GED or equivalent   Occupational History    Not on file   Tobacco Use    Smoking status: Light Smoker     Current packs/day: 0.50     Average packs/day: 0.5 packs/day for 3.0 years (1.5 ttl pk-yrs)     Types: Cigarettes    Smokeless tobacco: Never    Tobacco comments:     2/4/2021    smokes 3 cigarettes/day   Vaping Use    Vaping status: Never Used   Substance and Sexual Activity    Alcohol use: No     Comment: quit 2002    Drug use: No    Sexual activity: Yes     Partners: Female     Birth control/protection: None     Comment: no protection   Other Topics Concern    Parent/sibling w/ CABG, MI or angioplasty before 65F 55M? No   Social History Narrative    Not on file     Social Determinants of Health     Financial Resource Strain: Medium Risk (5/20/2022)    Overall Financial Resource Strain (CARDIA)     Difficulty of Paying Living Expenses: Somewhat hard   Food Insecurity: No Food Insecurity (5/20/2022)    Hunger Vital Sign     Worried About Running Out of Food in the Last Year: Never true     Ran Out of Food in the Last Year: Never true   Transportation Needs: No Transportation Needs (5/20/2022)    PRAPARE - Transportation     Lack of Transportation (Medical): No     Lack of Transportation (Non-Medical): No   Physical Activity: Unknown (8/4/2020)    Exercise Vital Sign      "Days of Exercise per Week: 2 days     Minutes of Exercise per Session: Not on file   Stress: No Stress Concern Present (8/4/2020)    [employer] - Occupational Stress Questionnaire     Feeling of Stress : Only a little   Social Connections: Unknown (1/1/2022)    Received from [facility], [facility]    Social Connections     Frequency of Communication with Friends and Family: Not on file   Interpersonal Safety: Low Risk  (6/3/2024)    Interpersonal Safety     Do you feel physically and emotionally safe where you currently live?: Yes     Within the past 12 months, have you been hit, slapped, kicked or otherwise physically hurt by someone?: No     Within the past 12 months, have you been humiliated or emotionally abused in other ways by your partner or ex-partner?: No   Housing Stability: Low Risk  (8/4/2020)    Housing Stability Vital Sign     Unable to Pay for Housing in the Last Year: No     Number of Places Lived in the Last Year: 1     Unstable Housing in the Last Year: No              Physical Examination:     Vital signs:  /86   Pulse 70   Temp 98.1  F (36.7  C) (Oral)   Resp 18   Ht 1.803 m (5' 11\")   Wt 87.2 kg (192 lb 4.8 oz)   SpO2 98%   BMI 26.82 kg/m      GENERAL: alert and no distress  EYES: Eyes grossly normal to inspection, PERRL and conjunctivae and sclerae normal  HENT: ear canals and TM's normal, nose and mouth without ulcers or lesions, edentuous  NECK: no adenopathy, no asymmetry, masses, or scars  RESP: lungs clear to auscultation - no rales, rhonchi or wheezes  CV: regular rate and rhythm, normal S1 S2, no S3 or S4, no murmur, click or rub, no peripheral edema  ABDOMEN: soft, nontender, no hepatosplenomegaly, no masses and bowel sounds normal, +G-tube  MS: no gross musculoskeletal defects noted, no edema  SKIN: no suspicious lesions or rashes  NEURO: Normal strength and tone, mentation " intact and speech normal  PSYCH: mentation appears normal, affect normal/bright      Lines and devices:    Left upper chest CVC, CDI, non-tender, no surrounding erythema.    Labs, Microbiology and Imaging studies reviewed.          Laboratory Data:       Microbiology:    Culture   Date Value Ref Range Status   06/14/2024 No growth after 12 hours  Preliminary   06/14/2024 No growth after 12 hours  Preliminary   06/14/2024 Positive on the 1st day of incubation (A)  Preliminary   06/14/2024 Gram positive cocci in clusters (AA)  Preliminary     Comment:     2 of 2 bottles   06/03/2024 Positive on the 1st day of incubation (A)  Final   06/03/2024 Staphylococcus epidermidis (AA)  Final     Comment:     2 of 2 bottles   01/22/2024 No Growth  Final   01/22/2024 No Growth  Final   01/22/2024 <15 CFU Staphylococcus epidermidis (A)  Final     Comment:     Susceptibilities not routinely done, refer to antibiogram to view typical susceptibility profiles   01/20/2024 No Growth  Final   01/20/2024 No Growth  Final   01/19/2024 Positive on the 1st day of incubation (A)  Final   01/19/2024 Enterococcus faecalis (AA)  Final     Comment:     1 of 2 bottles  Susceptibilities done on previous cultures   01/19/2024 Positive on the 1st day of incubation (A)  Final   01/19/2024 Escherichia coli (AA)  Final     Comment:     2 of 2 bottles  Susceptibilities done on previous cultures   01/19/2024 Enterococcus faecalis (AA)  Final     Comment:     2 of 2 bottles  Susceptibilities done on previous cultures   01/19/2024 Positive on the 1st day of incubation (A)  Final   01/19/2024 Escherichia coli (AA)  Final     Comment:     2 of 2 bottles  Susceptibilities done on previous cultures   01/19/2024 Enterococcus faecalis (AA)  Final     Comment:     2 of 2 bottles  Susceptibilities done on previous cultures   01/18/2024 Positive on the 1st day of incubation (A)  Final   01/18/2024 Escherichia coli (AA)  Final     Comment:     2 of 2  bottles  Susceptibilities done on previous cultures   01/18/2024 Enterococcus faecalis (AA)  Final     Comment:     2 of 2 bottles  Susceptibilities done on previous cultures   01/18/2024 Positive on the 1st day of incubation (A)  Final   01/18/2024 Escherichia coli (AA)  Final     Comment:     2 of 2 bottles   01/18/2024 Enterococcus faecalis (AA)  Final     Comment:     2 of 2 bottles   01/18/2024 No Growth  Final   11/16/2023 No Growth  Final   11/16/2023 No Growth  Final   11/14/2023 <15 CFU Klebsiella pneumoniae (A)  Final     Comment:     Susceptibilities done on previous cultures   11/14/2023 No Growth  Final   11/14/2023 Positive on the 1st day of incubation (A)  Final   11/14/2023 Klebsiella pneumoniae (AA)  Final     Comment:     2 of 2 bottles  Susceptibilities done on previous cultures   11/14/2023 Positive on the 1st day of incubation (A)  Final   11/14/2023 Klebsiella pneumoniae (AA)  Final     Comment:     2 of 2 bottles  Susceptibilities done on previous cultures   11/11/2023 No Growth  Final   11/11/2023 Positive on the 1st day of incubation (A)  Final   11/11/2023 Klebsiella pneumoniae (AA)  Final     Comment:     2 of 2 bottles   08/07/2023 No Growth  Final   08/07/2023 No Growth  Final   08/06/2023 No Growth  Final   08/06/2023 No Growth  Final   08/04/2023 Positive on the 1st day of incubation (A)  Final   08/04/2023 Klebsiella pneumoniae (AA)  Final     Comment:     2 of 2 bottles   08/04/2023 No Growth  Final   07/07/2023 No Growth  Final   07/06/2023 No Growth  Final   07/06/2023 No Growth  Final   07/06/2023 No Growth  Final   07/05/2023 No Growth  Final   07/05/2023 No Growth  Final   07/04/2023 50-75 CFU Staphylococcus hominis (A)  Final   07/04/2023 Positive on the 1st day of incubation (A)  Final   07/04/2023 Staphylococcus epidermidis (AA)  Final     Comment:     2 of 2 bottlesSusceptibilities done on previous cultures   07/04/2023 No Growth  Final   07/03/2023 Positive on the 1st day  of incubation (A)  Final   07/03/2023 Staphylococcus epidermidis (AA)  Final     Comment:     2 of 2 bottles   07/03/2023 Positive on the 1st day of incubation (A)  Final   07/03/2023 Staphylococcus epidermidis (AA)  Final     Comment:     1 of 2 bottles   07/03/2023 Staphylococcus epidermidis (AA)  Final     Comment:     1 of 2 bottles  Susceptibilities done on previous cultures   06/10/2023 No Growth  Final   06/10/2023 No Growth  Final   06/05/2023 No Growth  Final   06/04/2023 Positive on the 1st day of incubation (A)  Final   06/04/2023 Klebsiella pneumoniae (AA)  Final     Comment:     1 of 2 bottles  Susceptibilities done on previous culture   06/04/2023 Enterococcus faecalis (AA)  Final     Comment:     1 of 2 bottlesSusceptibilities done on previous cultures   06/04/2023 Klebsiella oxytoca (AA)  Final     Comment:     1 of 2 bottles  Susceptibilities done on previous cultures     06/04/2023 Positive on the 1st day of incubation (A)  Final   06/04/2023 Klebsiella pneumoniae (AA)  Final     Comment:     2 of 2 bottles   06/04/2023 Enterococcus faecalis (AA)  Final     Comment:     2 of 2 bottles   06/04/2023 Klebsiella oxytoca (AA)  Final     Comment:     2 of 2 bottles     05/12/2023 No Growth  Final   05/12/2023 No Growth  Final   01/21/2023 No Growth  Final   01/21/2023 No Growth  Final   07/16/2022 No Growth  Final   07/16/2022 No Growth  Final   07/16/2022 No Growth  Final   07/09/2022 No Growth  Final   07/09/2022 No Growth  Final   07/08/2022 No Growth  Final   07/08/2022 No Growth  Final   07/08/2022 No Growth  Final   07/08/2022 No Growth  Final   07/07/2022 Positive on the 1st day of incubation (A)  Final   07/07/2022 Enterobacter cloacae complex (AA)  Final     Comment:     2 of 2 bottles  Susceptibilities done on previous cultures     07/07/2022 Bacillus cereus group, not anthracis (AA)  Final     Comment:     2 of 2 bottles  Identification obtained by MALDI-TOF mass spectrometry research use only  database. Test characteristics determined and verified by the [facility] Diseases Diagnostic [facility].  Susceptibilities done on previous cultures   07/07/2022 Lactococcus lactis (AA)  Final     Comment:     1 of 2 bottles  Susceptibilities done on previous cultures     07/07/2022 Positive on the 1st day of incubation (A)  Final   07/07/2022 Enterobacter cloacae complex (AA)  Final     Comment:     2 of 2 bottles  Susceptibilities done on previous cultures   07/07/2022 Bacillus cereus group, not anthracis (AA)  Final     Comment:     2 of 2 bottles  Identification obtained by MALDI-TOF mass spectrometry research use only database. Test characteristics determined and verified by the [facility].  Susceptibilities done on previous cultures   07/07/2022 Lactococcus lactis (AA)  Final     Comment:     2 of 2 bottles  Susceptibilities done on previous cultures   07/05/2022 Positive on the 1st day of incubation (A)  Final   07/05/2022 Lactococcus lactis (AA)  Final     Comment:     2 of 2 bottles  Susceptibilities done on previous cultures   07/05/2022 Enterobacter cloacae complex (AA)  Final     Comment:     2 of 2 bottles  Susceptibilities done on previous cultures   07/05/2022 Bacillus cereus group, not anthracis (AA)  Final     Comment:     2 of 2 bottles  Identification obtained by MALDIR[employer] mass spectrometry research use only database. Test characteristics determined and verified by the Infect[facility] Diseases Diagnostic Laboratory.  Susceptibilities done on previous cultures   07/05/2022 Positive on the 1st day of incubation (A)  Final   07/05/2022 Enterobacter cloacae complex (AA)  Final     Comment:     2 of 2 bottles   07/05/2022 Bacillus cereus group, not anthracis (AA)  Final     Comment:     2 of 2 bottles  Identification obtained by MALDI-TOF mass spectrometry research use only database. Test characteristics determined and verified by the Infect[facility] Diseases Diagnostic Laboratory.    07/05/2022 Lactococcus lactis (AA)  Final     Comment:     2 of 2 bottles     07/05/2022 Positive on the 1st day of incubation (A)  Final   07/05/2022 Lactococcus lactis (AA)  Final     Comment:     2 of 2 bottles  Susceptibilities done on previous cultures         Inflammatory Markers:   Recent Labs   Lab Test 07/04/23  1556 10/04/22  1440 07/16/22  2216 05/07/22  1423 03/15/22  1442 02/23/22  1621 01/08/22  1430 11/16/21  1300 08/24/21  0855 11/17/20  1445 07/28/20  1607 06/28/19  1345 05/14/19  1145 02/12/18  1400 01/23/18  0845 01/20/18  1030 10/02/17  1030 09/24/17  1900   SED 17  --  18*  --   --  10  --   --   --   --  10  --  13  --  10 11  --  31*   CRP  --  <2.9 4.4 34.0* <2.9 <2.9 52.0* <2.9 <2.9   < > <2.9   < >  --    < > <2.9 5.4   < > 26.6*    < > = values in this interval not displayed.     Urine Studies:    Recent Labs   Lab Test 01/22/24  0048 11/11/23  0042 08/06/23  1056 06/05/23  0820 01/21/23  0916   LEUKEST Negative Negative Negative Negative Negative   WBCU 1 1 1 4 0     Hematology Studies:    Recent Labs   Lab Test 06/14/24  2157 06/03/24  1645 05/31/24  1519 04/17/24  1328 03/05/24  1245 02/05/24  1007 01/25/24  0620 07/13/21  1110 06/29/21  1016 06/14/21  1017 06/09/21  1044 06/01/21  1117 05/25/21  1147 05/19/21  1342 05/18/21  1015   WBC 8.2 11.3* 6.4 6.1 5.2 8.7 7.7   < > 6.9 8.1 7.5 7.3   < > 6.1 6.2   ANEU  --   --   --   --   --   --   --   --  3.1 4.1 4.7 4.3  --  3.5 3.7   AEOS  --   --   --   --   --   --   --   --  0.2 0.2 0.3 0.1  --  0.2 0.2   HGB 12.5*  --  11.0* 12.8* 11.5* 8.6* 8.7*   < > 12.1* 12.0* 13.3 12.1*   < > 12.5* 12.6*   MCV 89  --  93 88 85 77* 79   < > 96 97 96 94   < > 97 98     --  153 205 179 243 279   < > 178 158 169 174   < > 159 145*    < > = values in this interval not displayed.      Metabolic Studies:   Recent Labs   Lab Test 06/14/24  2157 05/31/24  1519 04/17/24  1328 03/05/24  1245 02/05/24  0843    141 144 141 142   POTASSIUM 3.1* 3.7  4.0 3.7 4.1   CHLORIDE 106 105 109* 106 106   CO2 23 28 26 26 26   BUN 5.2* 12.8 12.8 12.6 20.0   CR 0.77 0.75 0.71 0.73 0.67   GFRESTIMATED >90 >90 >90 >90 >90     Hepatic Studies:   Recent Labs   Lab Test 06/14/24  2157 05/31/24  1519 04/17/24  1328 03/05/24  1245 02/05/24  0843 01/19/24  2113   BILITOTAL 0.3 0.4  0.4 0.2 0.4 0.2 0.2   ALKPHOS 88 88 88 69 68 78   ALBUMIN 3.5 3.7 3.7 4.0 3.9 3.9   AST 26 20 18 16 14 17   ALT 22 20 20 14 9 10     Hepatitis B Testing:   Recent Labs   Lab Test 08/29/22  1400 07/12/22  1718 07/08/22  0552   HBCAB  --  Nonreactive  --    HEPBANG Nonreactive  --  Reactive*   HBEABY  --  Negative  --    HBEAGN  --  Negative  --      Hepatitis C Testing:   Hepatitis C Antibody   Date Value Ref Range Status   07/08/2022 Nonreactive Nonreactive Final   09/20/2017 Nonreactive NR^Nonreactive Final     Comment:     Assay performance characteristics have not been established for newborns,   infants, and children              Last check of C difficile  C Diff Toxin B PCR   Date Value Ref Range Status   07/15/2012   Final    Negative: Clostridium difficile target DNA sequences NOT detected, presumed   negative for Clostridium difficile toxin B or the number of bacteria present   may be below the limit of detection for the test.   FDA approved assay performed using [employer] GeneXpert real-time PCR.   A negative result does not exclude actual disease due to Clostridium difficile   and may be due to improper collection, handling and storage of the specimen or   the number of organisms in the specimen is below the detection limit of the   assay.       Last Pathology Report   Case Report   Date Value Ref Range Status   09/30/2023   Final    Surgical Pathology Report                         Case: [id number]                                  Authorizing Provider:  Delvis Mercado MD     Collected:           09/30/2023 11:01 AM          Ordering Location:      MAIN OR                 Received:             10/02/2023 08:24 AM          Pathologist:           Scott Torres MD                                                             Specimens:   A) - Other, Previous abdominal scar skin                                                            B) - Other, Segment of small bowel                                                          Clinical Information   Date Value Ref Range Status   09/30/2023   Final    Intussusception of jejunum       Final Diagnosis   Date Value Ref Range Status   09/30/2023   Final    A. Abdominal scar, excision:  - Skin with dermal scarring  - Negative for dysplasia or malignancy    B. Segment of small bowel, segmentectomy:  - Small bowel with congestion and edema, consistent with intussusception  - Margins feature viable tissue  - Negative for malignancy              Imaging:     No results found for this or any previous visit (from the past 48 hour(s)).

## 2024-06-15 NOTE — PROGRESS NOTES
"CLINICAL NUTRITION SERVICES - ASSESSMENT NOTE     Nutrition Prescription    RECOMMENDATIONS FOR MDs/PROVIDERS TO ORDER:  Recommend initiating TPN within 2-3 days. Please send consult Pharmacy/Nutrition to start and manage TPN and specify line type in consult.     Malnutrition Status:    Unable to determine due to unable to complete NFPE    Recommendations already ordered by Registered Dietitian (RD):  None at this time     Future/Additional Recommendations:  TPN recommendations:    Dosing weight:  87 kg    Initial parameters (per day)  Volume:  per pharmD mL  Dextrose: 100 g  AA: 100 g  Lipids: 250 mL 20%, 5 days per week     Dextrose titration:   Monitor lytes and if within acceptable parameters (Mg++ > or = 1.5, K+ is > or = 3, and PO4 > or = 1.9), increase dextrose by 75 g/day to goal of 175 g dextrose.    Additives: per pharmD    Goal PN provides 175 g dextrose, 100 g AA, and 250 mL 20% lipids 5 days per week for total provision of 1352 Kcals (16 Kcals/kg), 1.1 g/kg protein, GIR 1.4 mg/kg/minute, and 26% fat kcals on average daily.     REASON FOR ASSESSMENT  Parker Acevedo is a/an 53 year old male assessed by the dietitian for Provider Order - \"uses TPN at home, paused with infection, assistance with nutirtion status\"    PAST MEDICAL HISTORY  history including RNY gastric bypass c/b malnutrition on TPN who was admitted on 6/15/2024. He presented to his PCP with fever 10 days prior to admission and S epidermidis was isolated from a single blood culture from that day (6/3/24). He presented several days after called back for this result.    NUTRITION HISTORY  Unable to obtain nutrition hx at this time. Per chart review, pt eats by mouth for comfort.     Per discussion with [facility] pharmD, pt has been non-compliant with medications and TPN x 2 weeks. However, pt's home regimen is as follows:  - \"Plain\" TPN bag 4x/week (T/Th/Sat/Sun): 1250 mL x 12 hrs with 175 g dex and 100 g AA   - TPN+ Lipids " "3x/week (M/W/F): 1000 mL x 12 hrs with 175 g dex, 100 g AA, and 250 mL 20% Intralipid daily   - FHI dosing weight 86.8 kg     7-day avg intake from TPN = 1209 kcal (14 kcal/kg), 1.15 g/kg PRO, and 18% kcal from fat per FHI dosing weight 86.8 kg    CURRENT NUTRITION ORDERS  Diet: Regular  Intake/Tolerance: No documented intake so far this admit.     LABS  Labs reviewed - hypokalemia    MEDICATIONS  Medications reviewed    ANTHROPOMETRICS  Height: 180.3 cm (5' 11\")  Most Recent Weight: 87.2 kg (192 lb 4.8 oz)    IBW: 78.2 kg  BMI: Overweight BMI 25-29.9  Weight History:   Wt Readings from Last 30 Encounters:   06/14/24 87.2 kg (192 lb 4.8 oz)   06/03/24 86.6 kg (191 lb)   02/05/24 81.6 kg (180 lb)   02/05/24 81.6 kg (180 lb)   01/18/24 81.6 kg (180 lb)   11/17/23 85.7 kg (189 lb)   11/11/23 82.1 kg (181 lb)   10/19/23 89.7 kg (197 lb 11.2 oz)   09/29/23 88.5 kg (195 lb)   09/19/23 89.8 kg (198 lb)   09/18/23 90.3 kg (199 lb)   09/05/23 89.8 kg (198 lb)   08/06/23 85 kg (187 lb 6.3 oz)   08/04/23 86.6 kg (191 lb)   07/11/23 86.8 kg (191 lb 6.4 oz)   06/07/23 87.7 kg (193 lb 6.4 oz)     Dosing Weight: 87 kg (actual BW)    ASSESSED NUTRITION NEEDS  Estimated Energy Needs: 8745-7360 kcals/day (20 - 25 kcals/kg)  Justification: Maintenance on TPN  Estimated Protein Needs:  grams protein/day (1 - 1.2 grams of pro/kg)  Justification: Maintenance  Estimated Fluid Needs: 1 mL/kcal  Justification: Maintenance    PHYSICAL FINDINGS  See malnutrition section below.  No abnormal nutrition-related physical findings observed.     MALNUTRITION  % Intake: Unable to assess  % Weight Loss: None noted  Subcutaneous Fat Loss: Unable to assess  Muscle Loss: Unable to assess  Fluid Accumulation/Edema: None noted  Malnutrition Diagnosis: Unable to determine due to unable to complete NFPE    NUTRITION DIAGNOSIS  Altered GI function related to gastric bypass and short gut syndrome as evidenced by need for TPN to meet majority of nutrition " "needs.       INTERVENTIONS  Implementation  Parenteral Nutrition/IV Fluids - recommendations above      Goals  Diet adv v nutrition support within 2-3 days.     Monitoring/Evaluation  Progress toward goals will be monitored and evaluated per protocol.  Taylor Stringer MS, RD, LD  Available on [employer] - \"4A Clinical Dietitian\"  Weekend/Holiday RD - \"Weekend Clinical Dietitian\"  "

## 2024-06-15 NOTE — CONSULTS
INTERVENTIONAL RADIOLOGY CONSULT NOTE    Reason for referral:   Bacteremia. Cvc removal.    History:   54 yo w/ short gut syndrome requiring TPN administered via tunneled left internal jugular CVC. Presented to ED with bactetermia. Peripheral blood cultures have been drawn. ID requesting removal of tunneled CVC in setting of bacteremia and would like tip sent for culture.    Imaging:   None to review.    Assessment:   Patient is approved for bedside removal of left internal jugular vein tunneled central venous catheter in setting of bacteremia.    Plan:   Left internal jugular central venous catheter to be removed at bedside.      Labs:  Lab Results   Component Value Date    HGB 12.5 06/14/2024    HGB 12.1 06/29/2021     Lab Results   Component Value Date     06/14/2024     06/29/2021     Lab Results   Component Value Date    WBC 8.2 06/14/2024    WBC 6.9 06/29/2021       Lab Results   Component Value Date    INR 1.10 01/22/2024    INR 1.07 05/07/2021       Lab Results   Component Value Date    PROTTOTAL 6.3 06/14/2024    PROTTOTAL 6.7 06/29/2021      Lab Results   Component Value Date    ALBUMIN 3.5 06/14/2024    ALBUMIN 3.2 12/13/2022    ALBUMIN 3.6 06/29/2021     Lab Results   Component Value Date    BILITOTAL 0.3 06/14/2024    BILITOTAL 0.7 06/29/2021     Lab Results   Component Value Date    BILICONJ 0.0 07/15/2012      Lab Results   Component Value Date    ALKPHOS 88 06/14/2024    ALKPHOS 61 06/29/2021     Lab Results   Component Value Date    AST 26 06/14/2024    AST 17 06/29/2021     Lab Results   Component Value Date    ALT 22 06/14/2024    ALT 24 06/29/2021       Lab Results   Component Value Date    CR 0.77 06/14/2024    CR 0.69 06/29/2021     Lab Results   Component Value Date    BUN 5.2 06/14/2024    BUN 17 12/13/2022    BUN 11 06/29/2021         Discussed with Dr. Farrell.  Lisandro Wren M.D.  Interventional Radiology PGY-5  IR pass pager: [phone]

## 2024-06-15 NOTE — PROVIDER NOTIFICATION
Provider notified patient is still refusing IV Vancomycin. Also refusing vital signs, other scheduled medications and assessments until he gets upstairs or gets to go outside and smoke

## 2024-06-15 NOTE — PHARMACY-VANCOMYCIN DOSING SERVICE
"Pharmacy Vancomycin Initial Note  Date of Service Catie 15, 2024  Patient's  1970  53 year old, male    Indication: Bacteremia    Current estimated CrCl = Estimated Creatinine Clearance: 140 mL/min (based on SCr of 0.77 mg/dL).    Creatinine for last 3 days  2024:  9:57 PM Creatinine 0.77 mg/dL    Recent Vancomycin Level(s) for last 3 days  No results found for requested labs within last 3 days.      Vancomycin IV Administrations (past 72 hours)        No vancomycin orders with administrations in past 72 hours.                    Nephrotoxins and other renal medications (From now, onward)      Start     Dose/Rate Route Frequency Ordered Stop    06/15/24 1200  vancomycin (VANCOCIN) 2,000 mg in sodium chloride 0.9 % 500 mL intermittent infusion        Placed in \"And\" Linked Group    2,000 mg  over 240 Minutes Intravenous ONCE 06/15/24 1053              Contrast Orders - past 72 hours (72h ago, onward)      None            [employer] Prediction of Planned Initial Vancomycin Regimen    Loading dose: N/A  Regimen: 1500 mg IV every 12 hours.  Start time: 15:29 on 06/15/2024  Exposure target: AUC24 (range)400-600 mg/L.hr   AUC24,ss: 582 mg/L.hr  Probability of AUC24 > 400: 85 %  Ctrough,ss: 17.5 mg/L  Probability of Ctrough,ss > 20: 39 %  Probability of nephrotoxicity (Lodise CARMELA ): 13 %          Plan:  Start vancomycin 2000mg IV once followed by 1500mg IV q12h  Vancomycin monitoring method: AUC  Vancomycin therapeutic monitoring goal: 400-600 mg*h/L  Pharmacy will check vancomycin levels as appropriate in 1-3 Days.    Serum creatinine levels will be ordered daily for the first week of therapy and at least twice weekly for subsequent weeks.      Daniela Vanegas, [facility]  "

## 2024-06-15 NOTE — MEDICATION SCRIBE - ADMISSION MEDICATION HISTORY
"Medication Scribe Admission Medication History    Admission medication history is complete. The information provided in this note is only as accurate as the sources available at the time of the update.    Information Source(s): Patient via in-person    Pertinent Information:   From admission note 06/15/2024 according to infusion Pharm D  noted patient non-compliant with medications and TPN x 2 weeks. Reported to medication scribe that he had taken all of his medication yesterday.    Patient has chronic TPN for nutrition and hydration. Patient receives his supplies for [facility]. After discharge patient plans to obtain his infusion supplies from another  of which he could not remember the name. While being serviced by [facility] his formula was   \"Plain\" TPN bag 4x/week (T/Th/Sat/Sun): 1250 mL x 12 hrs with 175 g dex and 100 g AA   - TPN+ Lipids 3x/week (M/W/F): 1000 mL x 12 hrs with 175 g dex, 100 g AA, and 250 mL 20% Intralipid daily  FHI dosing weight 86.8 kg. 7-day avg intake from TPN = 1209 kcal (14 kcal/kg), 1.15 g/kg PRO, and 18% kcal from fat per FHI dosing weight 86.8 kg.Notes in Care Everywhere indicated that the TPN would be on hold during patient stay.   Patient also takes medications thru a J tube.    Patient reported during the PTA interview that he is not taking these medications:  sucralfate (CARAFATE) 1 GM/10ML suspension  senna-docusate (SENOKOT-S/PERICOLACE) 8.6-50 MG tablet  pantoprazole (PROTONIX) 40 MG EC tablet  pregabalin (LYRICA) 25 MG capsule       Just a note patient is also prescribed amphetamine-dextroamphetamine or Adderall 20 mg tablet take one tablet by mouth once daily. Dispense report has the last date medication was prescribed note as 06/05/2024.     Changes made to PTA medication list:  Added: None  Deleted: sucralfate (CARAFATE) 1 GM/10ML suspension   Take 10 mLs (1 g) by mouth 4 times daily,    senna-docusate (SENOKOT-S/PERICOLACE) 8.6-50 MG tablet     Take 2 " tablets by mouth 2 times daily as needed for constipation   pantoprazole (PROTONIX) 40 MG EC tablet     TAKE 1 TABLET (40 MG) BY MOUTH DAILY   pregabalin (LYRICA) 25 MG capsule     Take 1 capsule (25 mg) by mouth at bedtime,     Changed: From nystatin (MYCOSTATIN) 639059 UNIT/GM external cream     Apply topically 2 times daily To nystatin (MYCOSTATIN) [id number] UNIT/GM external cream  Apply topically daily as needed for other (around area of J tube)   From  sucralfate (CARAFATE) 1 GM/10ML suspension     Take 10 mLs (1 g) by mouth 4 times daily To sucralfate (CARAFATE) 1 GM/10ML suspension  take 1 g by mouth 4 times daily as needed for nausea     Allergies reviewed with patient and updates made in EHR: yes    Medication History Completed By: Cecilia Gomez 6/15/2024 6:41 PM    No outpatient medications have been marked as taking for the 6/14/24 encounter (Hospital Encounter).

## 2024-06-16 LAB
ALBUMIN SERPL BCG-MCNC: 3.3 G/DL (ref 3.5–5.2)
ALP SERPL-CCNC: 91 U/L (ref 40–150)
ALT SERPL W P-5'-P-CCNC: 20 U/L (ref 0–70)
ANION GAP SERPL CALCULATED.3IONS-SCNC: 10 MMOL/L (ref 7–15)
AST SERPL W P-5'-P-CCNC: 23 U/L (ref 0–45)
BASOPHILS # BLD AUTO: 0.1 10E3/UL (ref 0–0.2)
BASOPHILS NFR BLD AUTO: 1 %
BILIRUB SERPL-MCNC: 0.6 MG/DL
BUN SERPL-MCNC: 9.3 MG/DL (ref 6–20)
CALCIUM SERPL-MCNC: 8.1 MG/DL (ref 8.6–10)
CHLORIDE SERPL-SCNC: 106 MMOL/L (ref 98–107)
CREAT SERPL-MCNC: 0.83 MG/DL (ref 0.67–1.17)
DEPRECATED HCO3 PLAS-SCNC: 24 MMOL/L (ref 22–29)
EGFRCR SERPLBLD CKD-EPI 2021: >90 ML/MIN/1.73M2
EOSINOPHIL # BLD AUTO: 0.3 10E3/UL (ref 0–0.7)
EOSINOPHIL NFR BLD AUTO: 4 %
ERYTHROCYTE [DISTWIDTH] IN BLOOD BY AUTOMATED COUNT: 15.9 % (ref 10–15)
GLUCOSE SERPL-MCNC: 84 MG/DL (ref 70–99)
HCT VFR BLD AUTO: 35.3 % (ref 40–53)
HGB BLD-MCNC: 11.3 G/DL (ref 13.3–17.7)
IMM GRANULOCYTES # BLD: 0 10E3/UL
IMM GRANULOCYTES NFR BLD: 1 %
LYMPHOCYTES # BLD AUTO: 2.5 10E3/UL (ref 0.8–5.3)
LYMPHOCYTES NFR BLD AUTO: 32 %
MCH RBC QN AUTO: 29.5 PG (ref 26.5–33)
MCHC RBC AUTO-ENTMCNC: 32 G/DL (ref 31.5–36.5)
MCV RBC AUTO: 92 FL (ref 78–100)
MONOCYTES # BLD AUTO: 1 10E3/UL (ref 0–1.3)
MONOCYTES NFR BLD AUTO: 13 %
NEUTROPHILS # BLD AUTO: 3.9 10E3/UL (ref 1.6–8.3)
NEUTROPHILS NFR BLD AUTO: 49 %
NRBC # BLD AUTO: 0 10E3/UL
NRBC BLD AUTO-RTO: 0 /100
PLATELET # BLD AUTO: 126 10E3/UL (ref 150–450)
POTASSIUM SERPL-SCNC: 3.8 MMOL/L (ref 3.4–5.3)
PROT SERPL-MCNC: 5.9 G/DL (ref 6.4–8.3)
RBC # BLD AUTO: 3.83 10E6/UL (ref 4.4–5.9)
SODIUM SERPL-SCNC: 140 MMOL/L (ref 135–145)
WBC # BLD AUTO: 7.8 10E3/UL (ref 4–11)

## 2024-06-16 PROCEDURE — 85025 COMPLETE CBC W/AUTO DIFF WBC: CPT | Performed by: STUDENT IN AN ORGANIZED HEALTH CARE EDUCATION/TRAINING PROGRAM

## 2024-06-16 PROCEDURE — 250N000013 HC RX MED GY IP 250 OP 250 PS 637

## 2024-06-16 PROCEDURE — 250N000011 HC RX IP 250 OP 636: Mod: JZ | Performed by: STUDENT IN AN ORGANIZED HEALTH CARE EDUCATION/TRAINING PROGRAM

## 2024-06-16 PROCEDURE — 99233 SBSQ HOSP IP/OBS HIGH 50: CPT | Performed by: STUDENT IN AN ORGANIZED HEALTH CARE EDUCATION/TRAINING PROGRAM

## 2024-06-16 PROCEDURE — 36415 COLL VENOUS BLD VENIPUNCTURE: CPT | Performed by: HOSPITALIST

## 2024-06-16 PROCEDURE — 258N000003 HC RX IP 258 OP 636: Mod: JZ | Performed by: PEDIATRICS

## 2024-06-16 PROCEDURE — 120N000002 HC R&B MED SURG/OB UMMC

## 2024-06-16 PROCEDURE — 80053 COMPREHEN METABOLIC PANEL: CPT | Performed by: STUDENT IN AN ORGANIZED HEALTH CARE EDUCATION/TRAINING PROGRAM

## 2024-06-16 PROCEDURE — 250N000013 HC RX MED GY IP 250 OP 250 PS 637: Performed by: HOSPITALIST

## 2024-06-16 PROCEDURE — 99232 SBSQ HOSP IP/OBS MODERATE 35: CPT | Performed by: PEDIATRICS

## 2024-06-16 PROCEDURE — 250N000011 HC RX IP 250 OP 636: Mod: JZ | Performed by: PEDIATRICS

## 2024-06-16 PROCEDURE — 999N000127 HC STATISTIC PERIPHERAL IV START W US GUIDANCE

## 2024-06-16 PROCEDURE — 250N000013 HC RX MED GY IP 250 OP 250 PS 637: Performed by: PEDIATRICS

## 2024-06-16 PROCEDURE — 250N000013 HC RX MED GY IP 250 OP 250 PS 637: Performed by: STUDENT IN AN ORGANIZED HEALTH CARE EDUCATION/TRAINING PROGRAM

## 2024-06-16 PROCEDURE — 87040 BLOOD CULTURE FOR BACTERIA: CPT | Performed by: HOSPITALIST

## 2024-06-16 RX ORDER — CEFAZOLIN SODIUM 2 G/100ML
2 INJECTION, SOLUTION INTRAVENOUS EVERY 8 HOURS
Status: DISCONTINUED | OUTPATIENT
Start: 2024-06-16 | End: 2024-06-19 | Stop reason: HOSPADM

## 2024-06-16 RX ORDER — ACETAMINOPHEN 325 MG/1
325 TABLET ORAL EVERY 4 HOURS PRN
Status: DISCONTINUED | OUTPATIENT
Start: 2024-06-16 | End: 2024-06-19 | Stop reason: HOSPADM

## 2024-06-16 RX ORDER — OXYCODONE HYDROCHLORIDE 10 MG/1
10 TABLET ORAL ONCE
Status: COMPLETED | OUTPATIENT
Start: 2024-06-16 | End: 2024-06-16

## 2024-06-16 RX ORDER — NYSTATIN 100000 U/G
CREAM TOPICAL DAILY PRN
Status: DISCONTINUED | OUTPATIENT
Start: 2024-06-16 | End: 2024-06-19 | Stop reason: HOSPADM

## 2024-06-16 RX ORDER — NALOXONE HYDROCHLORIDE 0.4 MG/ML
0.2 INJECTION, SOLUTION INTRAMUSCULAR; INTRAVENOUS; SUBCUTANEOUS
Status: DISCONTINUED | OUTPATIENT
Start: 2024-06-16 | End: 2024-06-19 | Stop reason: HOSPADM

## 2024-06-16 RX ORDER — LIDOCAINE 4 G/G
1 PATCH TOPICAL DAILY PRN
Status: DISCONTINUED | OUTPATIENT
Start: 2024-06-16 | End: 2024-06-19 | Stop reason: HOSPADM

## 2024-06-16 RX ORDER — NALOXONE HYDROCHLORIDE 0.4 MG/ML
0.4 INJECTION, SOLUTION INTRAMUSCULAR; INTRAVENOUS; SUBCUTANEOUS
Status: DISCONTINUED | OUTPATIENT
Start: 2024-06-16 | End: 2024-06-19 | Stop reason: HOSPADM

## 2024-06-16 RX ORDER — OXYCODONE HCL 5 MG/5 ML
5-10 SOLUTION, ORAL ORAL EVERY 4 HOURS PRN
Status: DISCONTINUED | OUTPATIENT
Start: 2024-06-16 | End: 2024-06-19 | Stop reason: HOSPADM

## 2024-06-16 RX ORDER — SODIUM CHLORIDE AND POTASSIUM CHLORIDE 150; 900 MG/100ML; MG/100ML
INJECTION, SOLUTION INTRAVENOUS CONTINUOUS
Status: DISCONTINUED | OUTPATIENT
Start: 2024-06-16 | End: 2024-06-17

## 2024-06-16 RX ADMIN — CARVEDILOL 12.5 MG: 12.5 TABLET, FILM COATED ORAL at 07:52

## 2024-06-16 RX ADMIN — POTASSIUM CHLORIDE AND SODIUM CHLORIDE: 900; 150 INJECTION, SOLUTION INTRAVENOUS at 15:55

## 2024-06-16 RX ADMIN — CARVEDILOL 12.5 MG: 12.5 TABLET, FILM COATED ORAL at 18:30

## 2024-06-16 RX ADMIN — ACETAMINOPHEN 325 MG: 325 TABLET, FILM COATED ORAL at 18:32

## 2024-06-16 RX ADMIN — OXYCODONE HYDROCHLORIDE 10 MG: 5 SOLUTION ORAL at 22:20

## 2024-06-16 RX ADMIN — DEXTROAMPHETAMINE SACCHARATE, AMPHETAMINE ASPARTATE, DEXTROAMPHETAMINE SULFATE AND AMPHETAMINE SULFATE 20 MG: 2.5; 2.5; 2.5; 2.5 TABLET ORAL at 07:52

## 2024-06-16 RX ADMIN — ALPRAZOLAM 0.5 MG: 0.25 TABLET ORAL at 22:28

## 2024-06-16 RX ADMIN — ENOXAPARIN SODIUM 80 MG: 80 INJECTION SUBCUTANEOUS at 20:59

## 2024-06-16 RX ADMIN — VANCOMYCIN HYDROCHLORIDE 1500 MG: 10 INJECTION, POWDER, LYOPHILIZED, FOR SOLUTION INTRAVENOUS at 10:40

## 2024-06-16 RX ADMIN — ALPRAZOLAM 0.5 MG: 0.25 TABLET ORAL at 08:39

## 2024-06-16 RX ADMIN — DIPHENHYDRAMINE HYDROCHLORIDE 25 MG: 50 INJECTION, SOLUTION INTRAMUSCULAR; INTRAVENOUS at 09:52

## 2024-06-16 RX ADMIN — CEFAZOLIN SODIUM 2 G: 2 INJECTION, SOLUTION INTRAVENOUS at 14:55

## 2024-06-16 RX ADMIN — OXYCODONE HYDROCHLORIDE 10 MG: 5 SOLUTION ORAL at 04:00

## 2024-06-16 RX ADMIN — SUCRALFATE 1 G: 1 SUSPENSION ORAL at 20:59

## 2024-06-16 RX ADMIN — OXYCODONE HYDROCHLORIDE 10 MG: 5 SOLUTION ORAL at 08:39

## 2024-06-16 RX ADMIN — OXYCODONE HYDROCHLORIDE 10 MG: 5 SOLUTION ORAL at 14:02

## 2024-06-16 RX ADMIN — OXYCODONE HYDROCHLORIDE 10 MG: 5 SOLUTION ORAL at 18:32

## 2024-06-16 RX ADMIN — CEFAZOLIN SODIUM 2 G: 2 INJECTION, SOLUTION INTRAVENOUS at 22:18

## 2024-06-16 RX ADMIN — ONDANSETRON 8 MG: 8 TABLET, ORALLY DISINTEGRATING ORAL at 22:28

## 2024-06-16 RX ADMIN — ENOXAPARIN SODIUM 80 MG: 80 INJECTION SUBCUTANEOUS at 09:53

## 2024-06-16 RX ADMIN — ONDANSETRON 8 MG: 8 TABLET, ORALLY DISINTEGRATING ORAL at 08:39

## 2024-06-16 ASSESSMENT — ACTIVITIES OF DAILY LIVING (ADL)
ADLS_ACUITY_SCORE: 30
DEPENDENT_IADLS:: TRANSPORTATION
ADLS_ACUITY_SCORE: 30

## 2024-06-16 NOTE — PLAN OF CARE
Goal Outcome Evaluation:      Plan of Care Reviewed With: patient, spouse    Overall Patient Progress: no changeOverall Patient Progress: no change    Outcome Evaluation: Plan to go home at discharge. Plan to have IV ABX at d/c with FHI.    Mally Willett RN    6/16/2024  Nurse Coordinator      Social Work and Care Management Department       SEARCHABLE in [facility] - search CARE COORDINATOR       [place] [employer] [place] (2217-1721) Saturday & Sunday; ([phone]) [facility] Recognized Holidays     Units: 5A Onc 5201 - 5219 RNCC,  5A Onc 5220 thru 5240 RNCC, 5C OFFSERVICE [phone] RNCC & 5C OFF SERVICE [phone] RNCC       Units: 6B Vocera, 6C Card 6401 thru 6420 RNCC, 6C Card 6502 thru 6514 RNCC & 6C Card 6515 thru 6519 RNCC        Units: 7A SOT RNCC Vocera, 7B Med Surg Vocera, 7C Med Surg 7401 thru 7418 RNCC & 7C Med Surg 7502 thru 7521 RNCC       Units: 6A Vocera & 4A CVICU Vocera, 4C MICU Vocera, and 4E SICU Vocera         Units: 5 Ortho Vocera & 5 Med Surg Vocera        Units: 6 Med Surg Vocera & 8 Med Surg Vocera

## 2024-06-16 NOTE — PLAN OF CARE
"Cares from: 4217-0632    V/S & pain: vitally stable,abdominal and lower back pain  for yrs according to pt  Neuro: alert, orientedx4  Respiratory:on room air, denies  and denies sob  Skin: L Lateral calf linear abrasion, L upper abdomen drain, scabs on both arms  GI/: L upper abdomen drain-clamped  Nutrition: regular diet  Lines/drains: RPIV   Activity: Independent    Events this shift: pt. Frequently goes outside. Informed pt with the hospital protocol. told  pt to always let his primary know everytime he goes outside. Lab called and informed charge nurse,  pt is positive for blood culture 6/15  at 1:28pm venous line gram positive cocci in clusters.  Pt. Refused oxycodone 10 mg tab one time dose, pt wants oral solution. Oxycodone 10 mg wasted.  Pt reported \" I walked because it helps me with abdominal and lower back pain\". Slept in between cares.    call light w/in reach and able to make needs known, will continue to monitor.  No acute event this shift.    Plan:  continue antibiotics                 "

## 2024-06-16 NOTE — PROGRESS NOTES
Brief Medicine Cross Cover Note    Notified multiple times that patient was refusing all medications/vitals until he was allowed to have a cigarette break outside or until he got a bed upstairs. Came to bedside, and discussed the results of the blood cultures being positive. Discussed possible risks of refusing treatment and delay treatment with IV antibiotics including endocarditis leading to septic emboli to the brain, lungs, extremities etc, respiratory failure, cardiac failure 2/2 damaged valve, and possibly septic shock and death. Patient is agreeable to starting treatment as soon as possible after discussion of risks.     Eryn Vuong [facility]

## 2024-06-16 NOTE — PROGRESS NOTES
RN & PA [facility], Eryn Vuong, spoke with pt regarding initiating IV Vanco. Pt was adamant that he smokes prior to adm. Re-iterated with pt that this is against hospital policy for pt's that are roomed in the ED.     Charge RN became involved and relayed the same message. Pt became very irate and walked out of his room and has been walking up and down the halls. Informed PA of these events.     After looking for the pt for about 10 minutes, the RN observed the pt back in his room. Re-approached pt about IV Vanco following the IV Benedryl adm. Pt was agreeable to both. Adm IV Benedryl. Per pt request, will start IV Vanco in 15-20 minutes. Updated PA.    Yuli Barnard, BSN  Shift: [phone] - [phone]

## 2024-06-16 NOTE — PROGRESS NOTES
Was notified of gram + cocci in clusters from venous line cultures drawn on 6/15.   Already on Vanco. Line removed  Repeat Bcx -peripheral- ordered for AM

## 2024-06-16 NOTE — PROGRESS NOTES
[facility]    Medicine Progress Note - Hospitalist Service, GOLD TEAM 9    Date of Admission:  6/14/2024    Assessment & Plan   Parker Acevedo is a 53 year old man with history including RNY gastric bypass c/b malnutrition on TPN who was admitted on 6/15/2024. He presented to his PCP with fever 10 days prior to admission and S epidermidis was isolated from a single blood culture from that day (6/3/24). He presented several days after called back for this result. He remains admitted for IV antibiotics.      Suspected CLABSI (S epidermidis)  In setting of recurrent CLABSI   In setting of indwelling Cm for TPN   Initially presented to clinic with fever 6/3, blood culture collected isolating S epidermidis. Labs did show leukocytosis that day. Called back to urge presentation to ED, and after many days presented to ED. Leukocytosis had resolved, last fever ~2 days PTA. Started on levofloxacin after collection of blood cultures per report though unable to see when this was started. There is a history of CLABSI. The patient reports switching home infusion companies to see if this helps reduce hospitalization. TTE without vegetations. Blood cultures 614 with Staph epi x2 bottles, from 6/15 staf epi from 1 bottle. Central line with GPS in clusters 6/15.  - cultures 6/16 no growth yet   - ID consulted, appreciate recs   - stopped Vancomycin and started cefazolin 2g q6h   - Blood cx ordered for 6/17 and 6/18   - will need line holiday and replacement (he notes that he tried the 48 hour line holiday before and immediately had a new infection of the new line. He is hoping to get new line and be able to go home by Wednesday)  - depending on central line growth likely will need RONEL    H/o Obesity s/p gastric bypass (2002) complicated by   H/o Severe malnutrition due to   Short gut syndrome   With chronic TPN   And venting J tube   Has been on chronic TPN for malnutrition, with  "inadequate intake in hospital with decreased PO intake, limited gut absorption. Electrolytes and hydration monitored during hospital stay and remained stable. RD followed patient during stay.  - Hold TPN  - RD consulted, appreciate recs   - per patient request bariatric surgery consult placed to replace PEG and to assess ventral hernia     Hypokalemia  - RN driven replacement     Chronic pain  - Continue fentanyl patch q48h as prescribed PTA, oxycodone q6h as PTA  ADHD - PTA amphetamine-dextroamphetamine not currently ordered  Paroxysmal Afib - Continue carvedilol 12.5mg BID  DVT - Continue enoxaparin as prescribed PTA   ADHD: cont home adderall          Diet: Combination Diet Regular Diet Adult    DVT Prophylaxis: enoxaparin, but is ambulating a lot, so may not be needed  Sanchez Catheter: Not present  Lines: PRESENT             Cardiac Monitoring: None  Code Status: Full Code      Clinically Significant Risk Factors        # Hypokalemia: Lowest K = 3.1 mmol/L in last 2 days, will replace as needed   # Hypocalcemia: Lowest Ca = 8.1 mg/dL in last 2 days, will monitor and replace as appropriate     # Hypoalbuminemia: Lowest albumin = 3.3 g/dL at 6/16/2024  6:13 AM, will monitor as appropriate   # Thrombocytopenia: Lowest platelets = 126 in last 2 days, will monitor for bleeding                # Overweight: Estimated body mass index is 26.82 kg/m  as calculated from the following:    Height as of this encounter: 1.803 m (5' 11\").    Weight as of this encounter: 87.2 kg (192 lb 4.8 oz)., PRESENT ON ADMISSION     # Financial/Environmental Concerns: none         Disposition Plan     Medically Ready for Discharge: Anticipated in 2-4 Days             Eva Gilmore MD  Hospitalist Service, GOLD TEAM [address] [place] [address]  Securely message with [email or web address] (more info)  Text page via [facility] Paging/Directory   See signed in provider for up to date coverage " information  ______________________________________________________________________    Interval History   No acute events overnight. Very talkative and comfortable. No pain at this time, including in his back.     Physical Exam   Vital Signs: Temp: 98.2  F (36.8  C) Temp src: Oral BP: 122/80 Pulse: 91   Resp: 16 SpO2: 97 % O2 Device: None (Room air)    Weight: 192 lbs 4.8 oz    General Appearance: Pleasant, in no acute distress  Respiratory: CTAB  Cardiovascular: rrr s1, s2 , no mrg  GI: soft, nontender, has umbilical hernia that reduced easily  Skin: warm, dry  Other: Nontender over spine     Medical Decision Making       MANAGEMENT DISCUSSED with the following over the past 24 hours: ID, nursing, care coordination   NOTE(S)/MEDICAL RECORDS REVIEWED over the past 24 hours: ID, nursing, care coordination, prior Medicine notes, H&P       Data     I have personally reviewed the following data over the past 24 hrs:    7.8  \   11.3 (L)   / 126 (L)     140 106 9.3 /  84   3.8 24 0.83 \     ALT: 20 AST: 23 AP: 91 TBILI: 0.6   ALB: 3.3 (L) TOT PROTEIN: 5.9 (L) LIPASE: N/A       Imaging results reviewed over the past 24 hrs:   No results found for this or any previous visit (from the past 24 hour(s)).

## 2024-06-16 NOTE — SUMMARY OF CARE
Reason for admission: positive blood culture., for antibiotics work up  Admitted from:  ED  Report received from:Yuli Barnard 2 RN skin assessment completed by:pete      - Findings (add LDA if needed): L lateral calf-linear abrasion, L upper abdomen drain, scabs on both arms  Bed algorithm reevaluated: yes  Was airflow pump ordered?:no  Suction set up in room?yes  Care plan (primary problem) and education initiated: yes  MDRO education done if applicable:none  Pt informed about policy regarding no IV pumps off unit:yes  Flu shot ordered? (October-April only):   Detailed Belongings:  clothes, shoes, belt bag (pt always carry it with him, did not show writer what's inside), cellphone, sunglasses     (Pt. Did not let writer touch his bag/belongings to check. Pt. Just showed what's inside)

## 2024-06-16 NOTE — PROVIDER NOTIFICATION
MD Name:Mike Alvarez MD     Vocera:  Pt. Is requesting for pain meds. 7/10 pain score. Next prn oxycodone is due at 4am. Q6H. Pt. Reported, he is taking oxycodone Q4H at home and told that he already discussed with day team regarding his pain regimen at home    MD Reply: I can give him a one time pain med for now. I Don't think I can change the regimen set by the day team

## 2024-06-16 NOTE — CONSULTS
Care Management Initial Consult    General Information  Assessment completed with: Patient, Spouse or significant other, VM-chart review, Pt Parker  Type of CM/SW Visit: Initial Assessment    Primary Care Provider verified and updated as needed: Yes (Chuy Isaac [phone] [address] [place] [id number] with [facility])   Readmission within the last 30 days: no previous admission in last 30 days      Reason for Consult: discharge planning (Elevated WILBERT)  Advance Care Planning: Advance Care Planning Reviewed: present on chart        Communication Assessment  Patient's communication style: spoken language (English or Bilingual)    Hearing Difficulty or Deaf: no   Wear Glasses or Blind: no    Cognitive  Cognitive/Neuro/Behavioral: WDL                      Living Environment:   People in home: child(francheska), adult, spouse, sibling(s)     Current living Arrangements: house      Able to return to prior arrangements: yes     Family/Social Support:  Care provided by: self, spouse/significant other, child(francheska)  Provides care for: no one  Marital Status:   Wife, Children, Parent(s), Sibling(s)  Wife Rose Richfeliciajulieta       Description of Support System: Supportive, Involved       Current Resources:   Patient receiving home care services: Yes  Skilled Home Care Services: Skilled Nursing  Community Resources: Infusion Services, Home Care  Equipment currently used at home: none  Supplies currently used at home: Other (TPN and hydration supplies)    Employment/Financial:  Employment Status: disabled        Financial Concerns: none      Does the patient's insurance plan have a 3 day qualifying hospital stay waiver?  No    Lifestyle & Psychosocial Needs:  Social Determinants of Health     Food Insecurity: No Food Insecurity (5/20/2022)    Hunger Vital Sign     Worried About Running Out of Food in the Last Year: Never true     Ran Out of Food in the Last Year: Never true   Depression: Not at risk (3/4/2024)    PHQ-2      PHQ-2 Score: 0   Housing Stability: Low Risk  (8/4/2020)    Housing Stability Vital Sign     Unable to Pay for Housing in the Last Year: No     Number of Places Lived in the Last Year: 1     Unstable Housing in the Last Year: No   Tobacco Use: High Risk (6/3/2024)    Patient History     Smoking Tobacco Use: Light Smoker     Smokeless Tobacco Use: Never     Passive Exposure: Not on file   Financial Resource Strain: Medium Risk (5/20/2022)    Overall Financial Resource Strain (CARDIA)     Difficulty of Paying Living Expenses: Somewhat hard   Alcohol Use: Not At Risk (8/4/2020)    AUDIT-C     Frequency of Alcohol Consumption: Never     Average Number of Drinks: Patient declined     Frequency of Binge Drinking: Never   Transportation Needs: No Transportation Needs (5/20/2022)    PRAPARE - Transportation     Lack of Transportation (Medical): No     Lack of Transportation (Non-Medical): No   Physical Activity: Unknown (8/4/2020)    Exercise Vital Sign     Days of Exercise per Week: 2 days     Minutes of Exercise per Session: Not on file   Interpersonal Safety: Low Risk  (6/3/2024)    Interpersonal Safety     Do you feel physically and emotionally safe where you currently live?: Yes     Within the past 12 months, have you been hit, slapped, kicked or otherwise physically hurt by someone?: No     Within the past 12 months, have you been humiliated or emotionally abused in other ways by your partner or ex-partner?: No   Stress: No Stress Concern Present (8/4/2020)    [employer] [employer] - Occupational Stress Questionnaire     Feeling of Stress : Only a little   Social Connections: Unknown (1/1/2022)    [facility] [employer] [facility]    Social Connections     Frequency of Communication with Friends and Family: Not on file   Health Literacy: Not on file     Functional Status:  Prior to admission patient needed assistance:   Dependent  "ADLs:: Independent  Dependent IADLs:: Transportation     Mental Health Status:  Mental Health Status: No Current Concerns       Chemical Dependency Status:  Chemical Dependency Status: No Current Concerns           Values/Beliefs:  Spiritual, Cultural Beliefs, Congregation Practices, Values that affect care: no  Description of Beliefs that Will Affect Care: none          Additional Information:    Background: Parker Acevedo is a 53 year old man with history including RNY gastric bypass c/b malnutrition on TPN who was admitted on 6/15/2024. He presented to his PCP with fever 10 days prior to admission and S epidermidis was isolated from a single blood culture from that day (6/3/24). He presented several days after called back for this result (Oleg eRddy DO).     Patient's status reviewed by chart. A CMA is needed d/t elevated re-admission risk score and discharge planning. Writer met with the patient, Parker, at the bedside and his wife, Rose, on the phone to complete a care management assessment. Writer introduced self, the role in care, and the nature of the care management assessment. The patient's PCP, address, and health insurance are all correct.    Parker lives at home with his wife Rose and their 25 years old son. His wife and his son are his main support at home. His siblings and parents are his additional help whenever needed. He is independence with all ADLs and needs transportation. He is TPN dependent and recently switched to [facility] home infusion services. Per Rose, this agency also provide \"everything, nursing and therapy\". She will come to see Parker tomorrow and will provide more detail on services from this agency. Pt will go home with IV ceFAZolin. Writer ask Parker/Rose if they are okay for writer to sen benefit check to [facility]. They both agreed. Benefit check email sent. Family to transport at discharge. Continue to monitor and support safe discharge plan.     Discharge Resource:  [facility]  P: [phone]  F: [phone]  TPN and hydration    [facility]  Main phone: [phone]; Intake: [phone]   Pending benefit check  IV ABX ceFAZolin 2g q8  Dr. Luisa Dominique is OP ID following    Mally Willett RN    6/16/2024  Nurse Coordinator      Social Work and Care Management Department       SEARCHABLE in [facility] - search CARE COORDINATOR       [place] ([phone]) Saturday & Sunday; ([phone]) [facility] Recognized Holidays     Units: 5A Onc 5201 - 5219 RNCC,  5A Onc 5220 thru 5240 RNCC, 5C OFFSERVICE [phone] RNCC & 5C OFF SERVICE [phone] RNCC       Units: 6B Vocera, 6C Card 6401 thru 6420 RNCC, 6C Card 6502 thru 6514 RNCC & 6C Card 6515 thru 6519 RNCC        Units: 7A SOT RNCC Vocera, 7B Med Surg Vocera, 7C Med Surg 7401 thru 7418 RNCC & 7C Med Surg 7502 thru 7521 RNCC       Units: 6A Vocera & 4A CVICU Vocera, 4C MICU Vocera, and 4E SICU Vocera         Units: 5 Ortho Vocera & 5 Med Surg Vocera        Units: 6 Med Surg Vocera & 8 Med Surg Vocera

## 2024-06-16 NOTE — PLAN OF CARE
Goal Outcome Evaluation:  Plan of Care Reviewed With: patient  Overall Patient Progress: no change  Outcome Evaluation:     Assumed Cares [id number]: Transferred to  at 11pm last night. Outside frequently to smoke. Compliant w/ returning on time for medications and returning within 15 minutes. PRN oxycodone given q4hrs per PTA regimen. Benadryl given before vanco. Antibx switched from vanco to cefazolin. PIV infusing NS w/ 20meq K due to dehydration. Poor oral intake. Voiding adequately. Cm clamped and used for venting per patient. Pt noted firmness in lower abdomen but no change in pain from baseline. Blood cultures to be collected in AM. Possible discharge Wednesday. VSS on RA.  Temp: 98.2  F (36.8  C)    BP: 122/80    Pulse: 91    Resp: 16    SpO2: 97 %    O2 Device: None (Room air)

## 2024-06-16 NOTE — PROGRESS NOTES
[facility]    Medicine Progress Note - Hospitalist Service, GOLD TEAM     Date of Admission:  6/14/2024    Assessment & Plan   Parker Acevedo is a 53 year old man with history including RNY gastric bypass c/b malnutrition on TPN who was admitted on 6/15/2024. He presented to his PCP with fever 10 days prior to admission and S epidermidis was isolated from a single blood culture from that day (6/3/24). He presented several days after called back for this result. He remains admitted for IV antibiotics and further wokrup as below including echo and CVC removal.      Suspected CLABSI (S epidermidis)  In setting of recurrent CLABSI   In setting of indwelling Cm for TPN   Initially presented to clinic with fever 6/3, blood culture collected isolating S epidermidis. Labs did show leukocytosis that day. Called back to urge presentation to ED, and after many days presented to ED. Leukocytosis had resolved, last fever ~2 days PTA. Started on levofloxacin after collection of blood cultures per report though unable to see when this was started. There is a history of CLABSI. The patient reports switching home infusion companies to see if this helps reduce hospitalization.   - Repeat blood cultures x3 6/14/24 with 1 growing GPCs  - ID consulted, appreciate recs   - Started on Vancomycin stopped Levaquin   - Blood cx repeated 6/15 with one from CVC and one peripheral   - TTE ordered   - IR consult for removal of Cm- likely will need line holiday and replacement      - Tip culture ordered on cm    H/o Obesity s/p gastric bypass (2002) complicated by   H/o Severe malnutrition due to   Short gut syndrome   With chronic TPN   And venting J tube   Has been on chronic TPN for malnutrition, with inadequate intake in hospital with decreased PO intake, limited gut absorption. Electrolytes and hydration monitored during hospital stay and remained stable. RD followed patient  "during stay.  - Hold TPN  - RD consulted, appreciate recs      Hypokalemia  - RN driven replacement     Chronic pain  - Continue fentanyl patch q48h as prescribed PTA, oxycodone q6h as PTA  ADHD - PTA amphetamine-dextroamphetamine not currently ordered  Paroxysmal Afib - Continue carvedilol 12.5mg BID  DVT - Continue enoxaparin as prescribed PTA   ADHD: cont home adderall        Diet: Combination Diet Regular Diet Adult    DVT Prophylaxis: Enoxaparin (Lovenox) SQ  Sanchez Catheter: Not present  Lines: PRESENT             Cardiac Monitoring: None  Code Status: Full Code      Clinically Significant Risk Factors Present on Admission        # Hypokalemia: Lowest K = 3.1 mmol/L in last 2 days, will replace as needed   # Hypocalcemia: Lowest Ca = 8.3 mg/dL in last 2 days, will monitor and replace as appropriate        # Drug Induced Coagulation Defect: home medication list includes an anticoagulant medication                # Overweight: Estimated body mass index is 26.82 kg/m  as calculated from the following:    Height as of this encounter: 1.803 m (5' 11\").    Weight as of this encounter: 87.2 kg (192 lb 4.8 oz).         # Financial/Environmental Concerns:           Disposition Plan     Medically Ready for Discharge: Anticipated in 2-4 Days       The patient's care was discussed with the Attending Physician, Dr. Anderson, Bedside Nurse, Patient, and ID Consultant(s).    Cecilia Gomez  Hospitalist Service, [facility]  Securely message with [employer] (more info)  Text page via [facility] Paging/Directory   See signed in provider for up to date coverage information  ______________________________________________________________________    Interval History   Pt reports feeling quite well this morning, he has no new symptoms, was in waiting room for a long time glad to have a room. Is okay to be without his TPN for a while as he does eat and drink some. Discussed plan as above " and patient is in agreement. Has no other questions or concerns at this time.     Physical Exam   Vital Signs: Temp: 98.6  F (37  C) Temp src: Oral BP: (!) 142/97 Pulse: 69   Resp: 18 SpO2: 98 % O2 Device: None (Room air)    Weight: 192 lbs 4.8 oz  Constitutional: sitting up in bed, appears comfortable, no apparent distress.  HEENT: Mucous membranes moist, no scleral icterus   Respiratory: No increased work of breathing, breathing comfortably on RA   Skin: normal skin color, texture, and no jaundice- azevedo on chest covered with clear dressing without surrounding erythema   Musculoskeletal: moving all extremities voluntarily during exam   Neuro: awake, alert, answering all questions appropriately during exam    Medical Decision Making       50 MINUTES SPENT BY ME on the date of service doing chart review, history, exam, documentation & further activities per the note.      Data     I have personally reviewed the following data over the past 24 hrs:    8.2  \   12.5 (L)   / 217     141 106 5.2 (L) /  103 (H)   3.1 (L) 23 0.77 \     ALT: 22 AST: 26 AP: 88 TBILI: 0.3   ALB: 3.5 TOT PROTEIN: 6.3 (L) LIPASE: N/A     Procal: N/A CRP: N/A Lactic Acid: 1.6         Imaging results reviewed over the past 24 hrs:   Recent Results (from the past 24 hour(s))   Echo Complete   Result Value    LVEF  55-60%    Narrative    [id number]  [id number]  [id number]  [id number]^JOSEPH^SEBAS     [facility],[place]  Echocardiography Laboratory  [address] [place] [id number]     Name: SARA HASSAN  MRN: [id number]  : 1970  Study Date: 06/15/2024 11:06 AM  Age: 53 yrs  Gender: Male  Patient Location: [facility]  Reason For Study: Endocarditis  Ordering Physician: SEBAS RUIZ  Performed By: Whit Mir     BSA: 2.1 m2  Height: 71 in  Weight: 192 lb  HR: 68  BP: 137/86 mmHg  ______________________________________________________________________________  Procedure  Complete Portable Echo Adult.  Technically difficult study.Extremely poor  acoustic windows.  ______________________________________________________________________________  Interpretation Summary  No vegetation or mass identified, however this does not exclude endocarditis.  ______________________________________________________________________________  Left Ventricle  Global and regional left ventricular function is normal with an EF of 55-60%.     Right Ventricle  The right ventricle is normal size. Global right ventricular function is  normal.     Mitral Valve  Trace mitral insufficiency is present.     Aortic Valve  Aortic valve is normal in structure and function.     Tricuspid Valve  The tricuspid valve is normal. Mild tricuspid insufficiency is present.  Pulmonary artery systolic pressure cannot be assessed.     Pulmonic Valve  The valve leaflets are not well visualized.     Vessels  IVC diameter and respiratory changes fall into an intermediate range  suggesting an RA pressure of 8 mmHg.     Pericardium  No pericardial effusion is present.     Compared to Previous Study  No significant changes noted.     ______________________________________________________________________________  MMode/2D Measurements & Calculations  IVSd: 0.79 cm  LVIDd: 5.2 cm  LVIDs: 3.2 cm  LVPWd: 0.88 cm  FS: 39.3 %     LV mass(C)d: 151.9 grams  LV mass(C)dI: 73.3 grams/m2  RWT: 0.34     ______________________________________________________________________________  Report approved by: MD Jose Enrique Sanchez 06/15/2024 12:10 PM

## 2024-06-16 NOTE — PROGRESS NOTES
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 Has tolerated slower infusion with diphenhydramine.  Doxycycline - causes rash  Ertapenem - nausea, vomiting  TMP-SMX, causes rash    Discussion:  54 yo M with MSSE bacteremia, likely 2/2 CLABSI. CVC has removed, pending culture. Blood cultures remained positive. Although since CVC has removed, hopefully blood cultures that are obtained afterwards should remain sterile. Suggest daily blood cultures until 48 hours negative. Hold of replacing CVC, ID team will weigh in for timing. Given Stap epidermidis is MSSE this admission (as similar to isolate on 6/6), recommend to stop iv vancomycin and start iv cefazolin. Noted TTE did not show endocarditis, although technically difficult study. If persistent bacteremia, despite CVC removal then will have to consider RONEL. Continue to monitor sings of secondary seeding.     Recommendations:  Daily blood cultures until negative for 48 hours  Follow up final blood culture report with susceptibility  Stop iv vancomycin (since Staph epidermidis this admission, is identified as MSSE)  Start iv cefazolin 2g q8h   Hold off CVC replacement  until bacteremia is cleared. ID team will weigh in for timeline.   TTE is negative for endocarditis, however, if blood cultures after CVC removal turn positive then have to consider RONEL at that time.     Recommendations are discussed with the primary team.     ID team will continue to follow. Please reach out if any questions or concerns.     Total time spent during this encounter (chart review, documentation, MDM, coordination of care): 50 minutes    Luisa Dominique MD   Infectious Diseases Staff Physician  Pager: [phone]  [employer]   06/16/2024         Interval History and Events:     Seen and examined - no new complaints. Feels somewhat better since started on antibiotic.          HPI as adopted from initial ID consult on 6/15/2024:     Parker Acevedo is a 54 yo M with PMHx of Celestino-en-y gastric bypass c/b short gut syndrome, now TPN-dependent via CVC, h/o DVT, hx of recurrent CLABSI, who presented to ER 6/14/2024 due to positive blood cultures 6/6 on outpatient fever work up.      Per patient, has been having fever, chills especially with use of CVC for TPN since past ~3 weeks. Also notes irriation around catheter insertion site since past one week. He was seen as outpatient primary care 6/6, whereby blood cultures were obtained which resulted positive, with growth of MSSE. Was told to go to ER then, and arrived on 6/14 and being admitted for bacteremia management. Afebrile, relatively stable vitals. Repeat blood cultures GPC in clusters, pending verigene. Started on levofloxacin based on previous blood cultures (Jan, 2024). ID consult is requested for antibiotic and bacteremia management.     SHx:  Lives with wife. Recently moved to own house ~3 weeks ago, prior to that was staying in hotel. Has 2 pet dogs (healthy corgi). Current smoking.          Review of Systems:   Targeted 10 point ROS was completed with pertinent positives and negatives are detailed above.         Antimicrobial history:  "    Current:  Vancomycin 6/15 - present         Physical Examination:     Vital signs:  /77 (BP Location: Right arm)   Pulse 65   Temp 97.7  F (36.5  C) (Oral)   Resp 16   Ht 1.803 m (5' 11\")   Wt 87.2 kg (192 lb 4.8 oz)   SpO2 98%   BMI 26.82 kg/m      GENERAL: alert and no distress  NECK: no adenopathy, no asymmetry, masses, or scars  RESP: lungs clear to auscultation - no rales, rhonchi or wheezes  CV: regular rate and rhythm, normal S1 S2, no S3 or S4, no murmur, click or rub, no peripheral edema  ABDOMEN: soft, nontender, no hepatosplenomegaly, no masses and bowel sounds normal  MS: no gross musculoskeletal defects noted, no edema  SKIN: left upper chest CVC removal site covered in dressing, w/o oozing or bleeding    Lines and devices:    PIV CDI, non-tender, no surrounding erythema.    Labs, Microbiology and Imaging studies reviewed.          Laboratory Data:       Microbiology:    Culture   Date Value Ref Range Status   06/15/2024 Culture in progress  Preliminary   06/15/2024 Positive on the 1st day of incubation (A)  Preliminary   06/15/2024 Gram positive cocci in clusters (AA)  Preliminary     Comment:     1 of 2 bottles   06/15/2024 No growth after 12 hours  Preliminary   06/15/2024 No growth after 12 hours  Preliminary   06/14/2024 Positive on the 1st day of incubation (A)  Preliminary   06/14/2024 Gram positive cocci in clusters (AA)  Preliminary     Comment:     2 of 2 bottles   06/14/2024 Positive on the 1st day of incubation (A)  Preliminary   06/14/2024 Gram positive cocci in clusters (AA)  Preliminary     Comment:     2 of 2 bottles   06/14/2024 Positive on the 1st day of incubation (A)  Preliminary   06/14/2024 Gram positive cocci in clusters (AA)  Preliminary     Comment:     2 of 2 bottles   06/03/2024 Positive on the 1st day of incubation (A)  Final   06/03/2024 Staphylococcus epidermidis (AA)  Final     Comment:     2 of 2 bottles   01/22/2024 No Growth  Final   01/22/2024 No " Growth  Final   01/22/2024 <15 CFU Staphylococcus epidermidis (A)  Final     Comment:     Susceptibilities not routinely done, refer to antibiogram to view typical susceptibility profiles   01/20/2024 No Growth  Final   01/20/2024 No Growth  Final   01/19/2024 Positive on the 1st day of incubation (A)  Final   01/19/2024 Enterococcus faecalis (AA)  Final     Comment:     1 of 2 bottles  Susceptibilities done on previous cultures   01/19/2024 Positive on the 1st day of incubation (A)  Final   01/19/2024 Escherichia coli (AA)  Final     Comment:     2 of 2 bottles  Susceptibilities done on previous cultures   01/19/2024 Enterococcus faecalis (AA)  Final     Comment:     2 of 2 bottles  Susceptibilities done on previous cultures   01/19/2024 Positive on the 1st day of incubation (A)  Final   01/19/2024 Escherichia coli (AA)  Final     Comment:     2 of 2 bottles  Susceptibilities done on previous cultures   01/19/2024 Enterococcus faecalis (AA)  Final     Comment:     2 of 2 bottles  Susceptibilities done on previous cultures   01/18/2024 Positive on the 1st day of incubation (A)  Final   01/18/2024 Escherichia coli (AA)  Final     Comment:     2 of 2 bottles  Susceptibilities done on previous cultures   01/18/2024 Enterococcus faecalis (AA)  Final     Comment:     2 of 2 bottles  Susceptibilities done on previous cultures   01/18/2024 Positive on the 1st day of incubation (A)  Final   01/18/2024 Escherichia coli (AA)  Final     Comment:     2 of 2 bottles   01/18/2024 Enterococcus faecalis (AA)  Final     Comment:     2 of 2 bottles   01/18/2024 No Growth  Final   11/16/2023 No Growth  Final   11/16/2023 No Growth  Final   11/14/2023 <15 CFU Klebsiella pneumoniae (A)  Final     Comment:     Susceptibilities done on previous cultures   11/14/2023 No Growth  Final   11/14/2023 Positive on the 1st day of incubation (A)  Final   11/14/2023 Klebsiella pneumoniae (AA)  Final     Comment:     2 of 2 bottles  Susceptibilities  done on previous cultures   11/14/2023 Positive on the 1st day of incubation (A)  Final   11/14/2023 Klebsiella pneumoniae (AA)  Final     Comment:     2 of 2 bottles  Susceptibilities done on previous cultures   11/11/2023 No Growth  Final   11/11/2023 Positive on the 1st day of incubation (A)  Final   11/11/2023 Klebsiella pneumoniae (AA)  Final     Comment:     2 of 2 bottles   08/07/2023 No Growth  Final   08/07/2023 No Growth  Final   08/06/2023 No Growth  Final   08/06/2023 No Growth  Final   08/04/2023 Positive on the 1st day of incubation (A)  Final   08/04/2023 Klebsiella pneumoniae (AA)  Final     Comment:     2 of 2 bottles   08/04/2023 No Growth  Final   07/07/2023 No Growth  Final   07/06/2023 No Growth  Final   07/06/2023 No Growth  Final   07/06/2023 No Growth  Final   07/05/2023 No Growth  Final   07/05/2023 No Growth  Final   07/04/2023 50-75 CFU Staphylococcus hominis (A)  Final   07/04/2023 Positive on the 1st day of incubation (A)  Final   07/04/2023 Staphylococcus epidermidis (AA)  Final     Comment:     2 of 2 bottlesSusceptibilities done on previous cultures   07/04/2023 No Growth  Final   07/03/2023 Positive on the 1st day of incubation (A)  Final   07/03/2023 Staphylococcus epidermidis (AA)  Final     Comment:     2 of 2 bottles   07/03/2023 Positive on the 1st day of incubation (A)  Final   07/03/2023 Staphylococcus epidermidis (AA)  Final     Comment:     1 of 2 bottles   07/03/2023 Staphylococcus epidermidis (AA)  Final     Comment:     1 of 2 bottles  Susceptibilities done on previous cultures   06/10/2023 No Growth  Final   06/10/2023 No Growth  Final   06/05/2023 No Growth  Final   06/04/2023 Positive on the 1st day of incubation (A)  Final   06/04/2023 Klebsiella pneumoniae (AA)  Final     Comment:     1 of 2 bottles  Susceptibilities done on previous culture   06/04/2023 Enterococcus faecalis (AA)  Final     Comment:     1 of 2 bottlesSusceptibilities done on previous cultures    06/04/2023 Klebsiella oxytoca (AA)  Final     Comment:     1 of 2 bottles  Susceptibilities done on previous cultures     06/04/2023 Positive on the 1st day of incubation (A)  Final   06/04/2023 Klebsiella pneumoniae (AA)  Final     Comment:     2 of 2 bottles   06/04/2023 Enterococcus faecalis (AA)  Final     Comment:     2 of 2 bottles   06/04/2023 Klebsiella oxytoca (AA)  Final     Comment:     2 of 2 bottles     05/12/2023 No Growth  Final   05/12/2023 No Growth  Final   01/21/2023 No Growth  Final   01/21/2023 No Growth  Final   07/16/2022 No Growth  Final   07/16/2022 No Growth  Final   07/16/2022 No Growth  Final   07/09/2022 No Growth  Final   07/09/2022 No Growth  Final   07/08/2022 No Growth  Final   07/08/2022 No Growth  Final   07/08/2022 No Growth  Final   07/08/2022 No Growth  Final   07/07/2022 Positive on the 1st day of incubation (A)  Final   07/07/2022 Enterobacter cloacae complex (AA)  Final     Comment:     2 of 2 bottles  Susceptibilities done on previous cultures     07/07/2022 Bacillus cereus group, not anthracis (AA)  Final     Comment:     2 of 2 bottles  Identification obtained by MALDI-TOF mass spectrometry research use only database. Test characteristics determined and verified by the [facility].  Susceptibilities done on previous cultures   07/07/2022 Lactococcus lactis (AA)  Final     Comment:     1 of 2 bottles  Susceptibilities done on previous cultures     07/07/2022 Positive on the 1st day of incubation (A)  Final   07/07/2022 Enterobacter cloacae complex (AA)  Final     Comment:     2 of 2 bottles  Susceptibilities done on previous cultures   07/07/2022 Bacillus cereus group, not anthracis (AA)  Final     Comment:     2 of 2 bottles  Identification obtained by MALDIN[employer] Apps[employer] mass spectrometry research use only database. Test characteristics determined and verified by the [facility].  Susceptibilities done on previous  cultures   07/07/2022 Lactococcus lactis (AA)  Final     Comment:     2 of 2 bottles  Susceptibilities done on previous cultures   07/05/2022 Positive on the 1st day of incubation (A)  Final   07/05/2022 Lactococcus lactis (AA)  Final     Comment:     2 of 2 bottles  Susceptibilities done on previous cultures   07/05/2022 Enterobacter cloacae complex (AA)  Final     Comment:     2 of 2 bottles  Susceptibilities done on previous cultures   07/05/2022 Bacillus cereus group, not anthracis (AA)  Final     Comment:     2 of 2 bottles  Identification obtained by MALDI-TOF mass spectrometry research use only database. Test characteristics determined and verified by the [facility].  Susceptibilities done on previous cultures     Inflammatory Markers:   Recent Labs   Lab Test 07/04/23  1556 10/04/22  1440 07/16/22  2216 05/07/22  1423 03/15/22  1442 02/23/22  1621 01/08/22  1430 11/16/21  1300 08/24/21  0855 11/17/20  1445 07/28/20  1607 06/28/19  1345 05/14/19  1145 02/12/18  1400 01/23/18  0845 01/20/18  1030 10/02/17  1030 09/24/17  1900   SED 17  --  18*  --   --  10  --   --   --   --  10  --  13  --  10 11  --  31*   CRP  --  <2.9 4.4 34.0* <2.9 <2.9 52.0* <2.9 <2.9   < > <2.9   < >  --    < > <2.9 5.4   < > 26.6*    < > = values in this interval not displayed.     Urine Studies:    Recent Labs   Lab Test 01/22/24  0048 11/11/23  0042 08/06/23  1056 06/05/23  0820 01/21/23  0916   LEUKEST Negative Negative Negative Negative Negative   WBCU 1 1 1 4 0     Hematology Studies:    Recent Labs   Lab Test 06/16/24  0613 06/14/24  2157 06/03/24  1645 05/31/24  1519 04/17/24  1328 03/05/24  1245 02/05/24  1007 07/13/21  1110 06/29/21  1016 06/14/21  1017 06/09/21  1044 06/01/21  1117 05/25/21  1147 05/19/21  1342 05/18/21  1015   WBC 7.8 8.2 11.3* 6.4 6.1 5.2 8.7   < > 6.9 8.1 7.5 7.3   < > 6.1 6.2   ANEU  --   --   --   --   --   --   --   --  3.1 4.1 4.7 4.3  --  3.5 3.7   AEOS  --   --   --    --   --   --   --   --  0.2 0.2 0.3 0.1  --  0.2 0.2   HGB 11.3* 12.5*  --  11.0* 12.8* 11.5* 8.6*   < > 12.1* 12.0* 13.3 12.1*   < > 12.5* 12.6*   MCV 92 89  --  93 88 85 77*   < > 96 97 96 94   < > 97 98   * 217  --  153 205 179 243   < > 178 158 169 174   < > 159 145*    < > = values in this interval not displayed.      Metabolic Studies:   Recent Labs   Lab Test 06/16/24  0613 06/14/24 2157 05/31/24  1519 04/17/24  1328 03/05/24  1245    141 141 144 141   POTASSIUM 3.8 3.1* 3.7 4.0 3.7   CHLORIDE 106 106 105 109* 106   CO2 24 23 28 26 26   BUN 9.3 5.2* 12.8 12.8 12.6   CR 0.83 0.77 0.75 0.71 0.73   GFRESTIMATED >90 >90 >90 >90 >90     Hepatic Studies:   Recent Labs   Lab Test 06/16/24  0613 06/14/24 2157 05/31/24  1519 04/17/24  1328 03/05/24  1245 02/05/24  0843   BILITOTAL 0.6 0.3 0.4  0.4 0.2 0.4 0.2   ALKPHOS 91 88 88 88 69 68   ALBUMIN 3.3* 3.5 3.7 3.7 4.0 3.9   AST 23 26 20 18 16 14   ALT 20 22 20 20 14 9     Hepatitis B Testing:   Recent Labs   Lab Test 08/29/22  1400 07/12/22  1718 07/08/22  0552   HBCAB  --  Nonreactive  --    HEPBANG Nonreactive  --  Reactive*   HBEABY  --  Negative  --    HBEAGN  --  Negative  --      Hepatitis C Testing:   Hepatitis C Antibody   Date Value Ref Range Status   07/08/2022 Nonreactive Nonreactive Final   09/20/2017 Nonreactive NR^Nonreactive Final     Comment:     Assay performance characteristics have not been established for newborns,   infants, and children             Last Pathology Report   Case Report   Date Value Ref Range Status   09/30/2023   Final    Surgical Pathology Report                         Case: [id number]                                  Authorizing Provider:  Delvis Mercado MD     Collected:           09/30/2023 11:01 AM          Ordering Location:      MAIN OR                 Received:            10/02/2023 08:24 AM          Pathologist:           Scott Torres                                                                                     MD Olman                                                             Specimens:   A) - Other, Previous abdominal scar skin                                                            B) - Other, Segment of small bowel                                                          Clinical Information   Date Value Ref Range Status   2023   Final    Intussusception of jejunum       Final Diagnosis   Date Value Ref Range Status   2023   Final    A. Abdominal scar, excision:  - Skin with dermal scarring  - Negative for dysplasia or malignancy    B. Segment of small bowel, segmentectomy:  - Small bowel with congestion and edema, consistent with intussusception  - Margins feature viable tissue  - Negative for malignancy              Imagin/15/2024 TTE:  Technically difficult study.Extremely poor acoustic windows.  Interpretation Summary: No vegetation or mass identified, however this does not exclude endocarditis.

## 2024-06-17 ENCOUNTER — HOME INFUSION (PRE-WILLOW HOME INFUSION) (OUTPATIENT)
Dept: PHARMACY | Facility: CLINIC | Age: 54
End: 2024-06-17
Payer: COMMERCIAL

## 2024-06-17 ENCOUNTER — APPOINTMENT (OUTPATIENT)
Dept: INTERVENTIONAL RADIOLOGY/VASCULAR | Facility: CLINIC | Age: 54
DRG: 314 | End: 2024-06-17
Attending: PEDIATRICS
Payer: COMMERCIAL

## 2024-06-17 LAB
BACTERIA BLD CULT: ABNORMAL
PLATELET # BLD AUTO: 228 10E3/UL (ref 150–450)
POTASSIUM SERPL-SCNC: 4.3 MMOL/L (ref 3.4–5.3)

## 2024-06-17 PROCEDURE — 250N000011 HC RX IP 250 OP 636: Mod: JZ | Performed by: STUDENT IN AN ORGANIZED HEALTH CARE EDUCATION/TRAINING PROGRAM

## 2024-06-17 PROCEDURE — 250N000011 HC RX IP 250 OP 636: Mod: JZ | Performed by: PEDIATRICS

## 2024-06-17 PROCEDURE — 0JPT3XZ REMOVAL OF TUNNELED VASCULAR ACCESS DEVICE FROM TRUNK SUBCUTANEOUS TISSUE AND FASCIA, PERCUTANEOUS APPROACH: ICD-10-PCS | Performed by: STUDENT IN AN ORGANIZED HEALTH CARE EDUCATION/TRAINING PROGRAM

## 2024-06-17 PROCEDURE — 36589 REMOVAL TUNNELED CV CATH: CPT | Mod: LT | Performed by: STUDENT IN AN ORGANIZED HEALTH CARE EDUCATION/TRAINING PROGRAM

## 2024-06-17 PROCEDURE — 84132 ASSAY OF SERUM POTASSIUM: CPT | Performed by: STUDENT IN AN ORGANIZED HEALTH CARE EDUCATION/TRAINING PROGRAM

## 2024-06-17 PROCEDURE — 36589 REMOVAL TUNNELED CV CATH: CPT

## 2024-06-17 PROCEDURE — 99232 SBSQ HOSP IP/OBS MODERATE 35: CPT | Performed by: STUDENT IN AN ORGANIZED HEALTH CARE EDUCATION/TRAINING PROGRAM

## 2024-06-17 PROCEDURE — 250N000013 HC RX MED GY IP 250 OP 250 PS 637: Performed by: STUDENT IN AN ORGANIZED HEALTH CARE EDUCATION/TRAINING PROGRAM

## 2024-06-17 PROCEDURE — 36415 COLL VENOUS BLD VENIPUNCTURE: CPT | Performed by: PEDIATRICS

## 2024-06-17 PROCEDURE — 250N000013 HC RX MED GY IP 250 OP 250 PS 637

## 2024-06-17 PROCEDURE — 85049 AUTOMATED PLATELET COUNT: CPT | Performed by: STUDENT IN AN ORGANIZED HEALTH CARE EDUCATION/TRAINING PROGRAM

## 2024-06-17 PROCEDURE — 87040 BLOOD CULTURE FOR BACTERIA: CPT | Performed by: PEDIATRICS

## 2024-06-17 PROCEDURE — 999N000248 HC STATISTIC IV INSERT WITH US BY RN

## 2024-06-17 PROCEDURE — 36415 COLL VENOUS BLD VENIPUNCTURE: CPT | Performed by: STUDENT IN AN ORGANIZED HEALTH CARE EDUCATION/TRAINING PROGRAM

## 2024-06-17 PROCEDURE — 120N000002 HC R&B MED SURG/OB UMMC

## 2024-06-17 PROCEDURE — 99233 SBSQ HOSP IP/OBS HIGH 50: CPT | Mod: 24 | Performed by: STUDENT IN AN ORGANIZED HEALTH CARE EDUCATION/TRAINING PROGRAM

## 2024-06-17 PROCEDURE — 250N000013 HC RX MED GY IP 250 OP 250 PS 637: Performed by: PEDIATRICS

## 2024-06-17 RX ADMIN — ENOXAPARIN SODIUM 80 MG: 80 INJECTION SUBCUTANEOUS at 08:14

## 2024-06-17 RX ADMIN — OXYCODONE HYDROCHLORIDE 10 MG: 5 SOLUTION ORAL at 14:17

## 2024-06-17 RX ADMIN — ENOXAPARIN SODIUM 80 MG: 80 INJECTION SUBCUTANEOUS at 22:22

## 2024-06-17 RX ADMIN — CEFAZOLIN SODIUM 2 G: 2 INJECTION, SOLUTION INTRAVENOUS at 14:11

## 2024-06-17 RX ADMIN — OXYCODONE HYDROCHLORIDE 10 MG: 5 SOLUTION ORAL at 22:34

## 2024-06-17 RX ADMIN — SUCRALFATE 1 G: 1 SUSPENSION ORAL at 11:42

## 2024-06-17 RX ADMIN — OXYCODONE HYDROCHLORIDE 10 MG: 5 SOLUTION ORAL at 06:19

## 2024-06-17 RX ADMIN — CEFAZOLIN SODIUM 2 G: 2 INJECTION, SOLUTION INTRAVENOUS at 07:05

## 2024-06-17 RX ADMIN — CARVEDILOL 12.5 MG: 12.5 TABLET, FILM COATED ORAL at 17:24

## 2024-06-17 RX ADMIN — ALPRAZOLAM 0.5 MG: 0.25 TABLET ORAL at 08:18

## 2024-06-17 RX ADMIN — SUCRALFATE 1 G: 1 SUSPENSION ORAL at 22:22

## 2024-06-17 RX ADMIN — DEXTROAMPHETAMINE SACCHARATE, AMPHETAMINE ASPARTATE, DEXTROAMPHETAMINE SULFATE AND AMPHETAMINE SULFATE 20 MG: 2.5; 2.5; 2.5; 2.5 TABLET ORAL at 08:12

## 2024-06-17 RX ADMIN — ACETAMINOPHEN 325 MG: 325 TABLET, FILM COATED ORAL at 02:32

## 2024-06-17 RX ADMIN — ACETAMINOPHEN 325 MG: 325 TABLET, FILM COATED ORAL at 14:17

## 2024-06-17 RX ADMIN — OXYCODONE HYDROCHLORIDE 10 MG: 5 SOLUTION ORAL at 02:28

## 2024-06-17 RX ADMIN — OXYCODONE HYDROCHLORIDE 10 MG: 5 SOLUTION ORAL at 18:21

## 2024-06-17 RX ADMIN — OXYCODONE HYDROCHLORIDE 10 MG: 5 SOLUTION ORAL at 10:17

## 2024-06-17 RX ADMIN — CEFAZOLIN SODIUM 2 G: 2 INJECTION, SOLUTION INTRAVENOUS at 22:22

## 2024-06-17 RX ADMIN — ALPRAZOLAM 0.5 MG: 0.25 TABLET ORAL at 22:39

## 2024-06-17 RX ADMIN — ACETAMINOPHEN 325 MG: 325 TABLET, FILM COATED ORAL at 10:17

## 2024-06-17 RX ADMIN — SUCRALFATE 1 G: 1 SUSPENSION ORAL at 15:30

## 2024-06-17 RX ADMIN — SUCRALFATE 1 G: 1 SUSPENSION ORAL at 08:12

## 2024-06-17 RX ADMIN — CARVEDILOL 12.5 MG: 12.5 TABLET, FILM COATED ORAL at 08:12

## 2024-06-17 RX ADMIN — ACETAMINOPHEN 325 MG: 325 TABLET, FILM COATED ORAL at 18:21

## 2024-06-17 ASSESSMENT — ACTIVITIES OF DAILY LIVING (ADL)
ADLS_ACUITY_SCORE: 30

## 2024-06-17 NOTE — PROGRESS NOTES
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 Has tolerated slower infusion with diphenhydramine.  Doxycycline - causes rash  Ertapenem - nausea, vomiting  TMP-SMX, causes rash    Discussion:  54 yo M with MSSE bacteremia, likely 2/2 CLABSI. CVC has removed, pending culture. Blood cultures remained positive. Although since CVC has removed, hopefully blood cultures that are obtained afterwards should remain sterile. Suggest daily blood cultures until 48 hours negative. Hold of replacing CVC, ID team will weigh in for timing. Given Stap epidermidis is MSSE this admission (as similar to isolate on 6/6), recommend to stop iv vancomycin and start iv cefazolin. Noted TTE did not show endocarditis, although technically difficult study. If persistent bacteremia, despite CVC removal then will have to consider RONEL. Continue to monitor sings of secondary seeding.     On today's visit, remains clinically well. Tolerating cefazolin without side effects reported.     Recommendations:  Daily blood cultures until negative for 48 hours  Follow up final blood culture report with susceptibility  Continue iv cefazolin 2g q8h   Hold off  CVC replacement until bacteremia is cleared. ID team will weigh in for timeline.   TTE is negative for endocarditis, however, if blood cultures after CVC removal turn positive then have to consider RONEL at that time.     Recommendations are discussed with the primary team.     ID team will continue to follow. Please reach out if any questions or concerns.     Total time spent during this encounter (chart review, documentation, MDM, coordination of care): 50 minutes    Luisa Dominique MD   Infectious Diseases Staff Physician  Pager: [phone]  [facility] ines   06/17/2024         Interval History and Events:     Seen and examined, accompanied by wife at bedside - doing well overall, no new complaints are reported today.         HPI as adopted from initial ID consult on 6/15/2024:     Parker Acevedo is a 54 yo M with PMHx of Celestino-en-y gastric bypass c/b short gut syndrome, now TPN-dependent via CVC, h/o DVT, hx of recurrent CLABSI, who presented to ER 6/14/2024 due to positive blood cultures 6/6 on outpatient fever work up.      Per patient, has been having fever, chills especially with use of CVC for TPN since past ~3 weeks. Also notes irriation around catheter insertion site since past one week. He was seen as outpatient primary care 6/6, whereby blood cultures were obtained which resulted positive, with growth of MSSE. Was told to go to ER then, and arrived on 6/14 and being admitted for bacteremia management. Afebrile, relatively stable vitals. Repeat blood cultures GPC in clusters, pending verigene. Started on levofloxacin based on previous blood cultures (Jan, 2024). ID consult is requested for antibiotic and bacteremia management.     SHx:  Lives with wife. Recently moved to own house ~3 weeks ago, prior to that was staying in hotel. Has 2 pet dogs (healthy corgi). Current smoking.          Review of Systems:   Targeted 10 point ROS was completed with pertinent positives and negatives are detailed above.          "Antimicrobial history:     Current:  cefazolin 6/16 - present         Physical Examination:     Vital signs:  /65 (BP Location: Right arm)   Pulse 79   Temp 98.1  F (36.7  C) (Oral)   Resp 18   Ht 1.803 m (5' 11\")   Wt 87.2 kg (192 lb 4.8 oz)   SpO2 100%   BMI 26.82 kg/m      GENERAL: alert and no distress  NECK: no adenopathy, no asymmetry, masses, or scars  RESP: lungs clear to auscultation - no rales, rhonchi or wheezes  CV: regular rate and rhythm, normal S1 S2, no S3 or S4, no murmur, click or rub, no peripheral edema  ABDOMEN: soft, nontender, no hepatosplenomegaly, no masses and bowel sounds normal  MS: no gross musculoskeletal defects noted, no edema  SKIN: left upper chest CVC removal site covered in dressing, w/o oozing or bleeding    Lines and devices:    PIV CDI, non-tender, no surrounding erythema.    Labs, Microbiology and Imaging studies reviewed.          Laboratory Data:       Microbiology:    Culture   Date Value Ref Range Status   06/16/2024 No growth after 1 day  Preliminary   06/16/2024 No growth after 1 day  Preliminary   06/15/2024 Culture in progress  Preliminary   06/15/2024 15-50 CFU Staphylococcus epidermidis (A)  Preliminary   06/15/2024 Positive on the 1st day of incubation (A)  Preliminary   06/15/2024 Gram positive cocci in clusters (AA)  Preliminary     Comment:     2 of 2 bottles   06/15/2024 Positive on the 2nd day of incubation (A)  Preliminary   06/15/2024 Gram positive cocci in clusters (AA)  Preliminary     Comment:     1 of 2 bottles   06/15/2024 No growth after 1 day  Preliminary   06/14/2024 Positive on the 1st day of incubation (A)  Final   06/14/2024 Staphylococcus epidermidis (AA)  Final     Comment:     2 of 2 bottles  Susceptibilities done on previous cultures   06/14/2024 Positive on the 1st day of incubation (A)  Final   06/14/2024 Staphylococcus epidermidis (AA)  Final     Comment:     2 of 2 bottles  Susceptibilities done on previous cultures "   06/14/2024 Positive on the 1st day of incubation (A)  Preliminary   06/14/2024 Staphylococcus epidermidis (AA)  Preliminary     Comment:     2 of 2 bottles   06/14/2024 Staphylococcus epidermidis (AA)  Preliminary     Comment:     1 of 2 bottles   06/03/2024 Positive on the 1st day of incubation (A)  Final   06/03/2024 Staphylococcus epidermidis (AA)  Final     Comment:     2 of 2 bottles   01/22/2024 No Growth  Final   01/22/2024 No Growth  Final   01/22/2024 <15 CFU Staphylococcus epidermidis (A)  Final     Comment:     Susceptibilities not routinely done, refer to antibiogram to view typical susceptibility profiles   01/20/2024 No Growth  Final   01/20/2024 No Growth  Final   01/19/2024 Positive on the 1st day of incubation (A)  Final   01/19/2024 Enterococcus faecalis (AA)  Final     Comment:     1 of 2 bottles  Susceptibilities done on previous cultures   01/19/2024 Positive on the 1st day of incubation (A)  Final   01/19/2024 Escherichia coli (AA)  Final     Comment:     2 of 2 bottles  Susceptibilities done on previous cultures   01/19/2024 Enterococcus faecalis (AA)  Final     Comment:     2 of 2 bottles  Susceptibilities done on previous cultures   01/19/2024 Positive on the 1st day of incubation (A)  Final   01/19/2024 Escherichia coli (AA)  Final     Comment:     2 of 2 bottles  Susceptibilities done on previous cultures   01/19/2024 Enterococcus faecalis (AA)  Final     Comment:     2 of 2 bottles  Susceptibilities done on previous cultures   01/18/2024 Positive on the 1st day of incubation (A)  Final   01/18/2024 Escherichia coli (AA)  Final     Comment:     2 of 2 bottles  Susceptibilities done on previous cultures   01/18/2024 Enterococcus faecalis (AA)  Final     Comment:     2 of 2 bottles  Susceptibilities done on previous cultures   01/18/2024 Positive on the 1st day of incubation (A)  Final   01/18/2024 Escherichia coli (AA)  Final     Comment:     2 of 2 bottles   01/18/2024 Enterococcus  faecalis (AA)  Final     Comment:     2 of 2 bottles   01/18/2024 No Growth  Final   11/16/2023 No Growth  Final   11/16/2023 No Growth  Final   11/14/2023 <15 CFU Klebsiella pneumoniae (A)  Final     Comment:     Susceptibilities done on previous cultures   11/14/2023 No Growth  Final   11/14/2023 Positive on the 1st day of incubation (A)  Final   11/14/2023 Klebsiella pneumoniae (AA)  Final     Comment:     2 of 2 bottles  Susceptibilities done on previous cultures   11/14/2023 Positive on the 1st day of incubation (A)  Final   11/14/2023 Klebsiella pneumoniae (AA)  Final     Comment:     2 of 2 bottles  Susceptibilities done on previous cultures   11/11/2023 No Growth  Final   11/11/2023 Positive on the 1st day of incubation (A)  Final   11/11/2023 Klebsiella pneumoniae (AA)  Final     Comment:     2 of 2 bottles   08/07/2023 No Growth  Final   08/07/2023 No Growth  Final   08/06/2023 No Growth  Final   08/06/2023 No Growth  Final   08/04/2023 Positive on the 1st day of incubation (A)  Final   08/04/2023 Klebsiella pneumoniae (AA)  Final     Comment:     2 of 2 bottles   08/04/2023 No Growth  Final   07/07/2023 No Growth  Final   07/06/2023 No Growth  Final   07/06/2023 No Growth  Final   07/06/2023 No Growth  Final   07/05/2023 No Growth  Final   07/05/2023 No Growth  Final   07/04/2023 50-75 CFU Staphylococcus hominis (A)  Final   07/04/2023 Positive on the 1st day of incubation (A)  Final   07/04/2023 Staphylococcus epidermidis (AA)  Final     Comment:     2 of 2 bottlesSusceptibilities done on previous cultures   07/04/2023 No Growth  Final   07/03/2023 Positive on the 1st day of incubation (A)  Final   07/03/2023 Staphylococcus epidermidis (AA)  Final     Comment:     2 of 2 bottles   07/03/2023 Positive on the 1st day of incubation (A)  Final   07/03/2023 Staphylococcus epidermidis (AA)  Final     Comment:     1 of 2 bottles   07/03/2023 Staphylococcus epidermidis (AA)  Final     Comment:     1 of 2  bottles  Susceptibilities done on previous cultures   06/10/2023 No Growth  Final   06/10/2023 No Growth  Final   06/05/2023 No Growth  Final   06/04/2023 Positive on the 1st day of incubation (A)  Final   06/04/2023 Klebsiella pneumoniae (AA)  Final     Comment:     1 of 2 bottles  Susceptibilities done on previous culture   06/04/2023 Enterococcus faecalis (AA)  Final     Comment:     1 of 2 bottlesSusceptibilities done on previous cultures   06/04/2023 Klebsiella oxytoca (AA)  Final     Comment:     1 of 2 bottles  Susceptibilities done on previous cultures     06/04/2023 Positive on the 1st day of incubation (A)  Final   06/04/2023 Klebsiella pneumoniae (AA)  Final     Comment:     2 of 2 bottles   06/04/2023 Enterococcus faecalis (AA)  Final     Comment:     2 of 2 bottles   06/04/2023 Klebsiella oxytoca (AA)  Final     Comment:     2 of 2 bottles     05/12/2023 No Growth  Final   05/12/2023 No Growth  Final   01/21/2023 No Growth  Final   01/21/2023 No Growth  Final   07/16/2022 No Growth  Final   07/16/2022 No Growth  Final   07/16/2022 No Growth  Final   07/09/2022 No Growth  Final   07/09/2022 No Growth  Final   07/08/2022 No Growth  Final   07/08/2022 No Growth  Final   07/08/2022 No Growth  Final   07/08/2022 No Growth  Final   07/07/2022 Positive on the 1st day of incubation (A)  Final   07/07/2022 Enterobacter cloacae complex (AA)  Final     Comment:     2 of 2 bottles  Susceptibilities done on previous cultures     07/07/2022 Bacillus cereus group, not anthracis (AA)  Final     Comment:     2 of 2 bottles  Identification obtained by MALDI-TOF mass spectrometry research use only database. Test characteristics determined and verified by the [facility].  Susceptibilities done on previous cultures   07/07/2022 Lactococcus lactis (AA)  Final     Comment:     1 of 2 bottles  Susceptibilities done on previous cultures     07/07/2022 Positive on the 1st day of incubation (A)   Final   07/07/2022 Enterobacter cloacae complex (AA)  Final     Comment:     2 of 2 bottles  Susceptibilities done on previous cultures   07/07/2022 Bacillus cereus group, not anthracis (AA)  Final     Comment:     2 of 2 bottles  Identification obtained by MALDI-TOF mass spectrometry research use only database. Test characteristics determined and verified by the [facility].  Susceptibilities done on previous cultures   07/07/2022 Lactococcus lactis (AA)  Final     Comment:     2 of 2 bottles  Susceptibilities done on previous cultures     Inflammatory Markers:   Recent Labs   Lab Test 07/04/23  1556 10/04/22  1440 07/16/22  2216 05/07/22  1423 03/15/22  1442 02/23/22  1621 01/08/22  1430 11/16/21  1300 08/24/21  0855 11/17/20  1445 07/28/20  1607 06/28/19  1345 05/14/19  1145 02/12/18  1400 01/23/18  0845 01/20/18  1030 10/02/17  1030 09/24/17  1900   SED 17  --  18*  --   --  10  --   --   --   --  10  --  13  --  10 11  --  31*   CRP  --  <2.9 4.4 34.0* <2.9 <2.9 52.0* <2.9 <2.9   < > <2.9   < >  --    < > <2.9 5.4   < > 26.6*    < > = values in this interval not displayed.     Urine Studies:    Recent Labs   Lab Test 01/22/24  0048 11/11/23  0042 08/06/23  1056 06/05/23  0820 01/21/23  0916   LEUKEST Negative Negative Negative Negative Negative   WBCU 1 1 1 4 0     Hematology Studies:    Recent Labs   Lab Test 06/16/24  0613 06/14/24  2157 06/03/24  1645 05/31/24  1519 04/17/24  1328 03/05/24  1245 02/05/24  1007 07/13/21  1110 06/29/21  1016 06/14/21  1017 06/09/21  1044 06/01/21  1117 05/25/21  1147 05/19/21  1342 05/18/21  1015   WBC 7.8 8.2 11.3* 6.4 6.1 5.2 8.7   < > 6.9 8.1 7.5 7.3   < > 6.1 6.2   ANEU  --   --   --   --   --   --   --   --  3.1 4.1 4.7 4.3  --  3.5 3.7   AEOS  --   --   --   --   --   --   --   --  0.2 0.2 0.3 0.1  --  0.2 0.2   HGB 11.3* 12.5*  --  11.0* 12.8* 11.5* 8.6*   < > 12.1* 12.0* 13.3 12.1*   < > 12.5* 12.6*   MCV 92 89  --  93 88 85 77*   < > 96  97 96 94   < > 97 98   * 217  --  153 205 179 243   < > 178 158 169 174   < > 159 145*    < > = values in this interval not displayed.      Metabolic Studies:   Recent Labs   Lab Test 06/17/24  0703 06/16/24  0613 06/14/24 2157 05/31/24 1519 04/17/24  1328 03/05/24  1245   NA  --  140 141 141 144 141   POTASSIUM 4.3 3.8 3.1* 3.7 4.0 3.7   CHLORIDE  --  106 106 105 109* 106   CO2  --  24 23 28 26 26   BUN  --  9.3 5.2* 12.8 12.8 12.6   CR  --  0.83 0.77 0.75 0.71 0.73   GFRESTIMATED  --  >90 >90 >90 >90 >90     Hepatic Studies:   Recent Labs   Lab Test 06/16/24 0613 06/14/24 2157 05/31/24 1519 04/17/24  1328 03/05/24  1245 02/05/24  0843   BILITOTAL 0.6 0.3 0.4  0.4 0.2 0.4 0.2   ALKPHOS 91 88 88 88 69 68   ALBUMIN 3.3* 3.5 3.7 3.7 4.0 3.9   AST 23 26 20 18 16 14   ALT 20 22 20 20 14 9     Hepatitis B Testing:   Recent Labs   Lab Test 08/29/22  1400 07/12/22  1718 07/08/22  0552   HBCAB  --  Nonreactive  --    HEPBANG Nonreactive  --  Reactive*   HBEABY  --  Negative  --    HBEAGN  --  Negative  --      Hepatitis C Testing:   Hepatitis C Antibody   Date Value Ref Range Status   07/08/2022 Nonreactive Nonreactive Final   09/20/2017 Nonreactive NR^Nonreactive Final     Comment:     Assay performance characteristics have not been established for newborns,   infants, and children             Last Pathology Report   Case Report   Date Value Ref Range Status   09/30/2023   Final    Surgical Pathology Report                         Case: [id number]                                  Authorizing Provider:  Delvis Mercado MD     Collected:           09/30/2023 11:01 AM          Ordering Location:      MAIN OR                 Received:            10/02/2023 08:24 AM          Pathologist:           Scott Torres MD                                                             Specimens:   A) - Other, Previous  abdominal scar skin                                                            B) - Other, Segment of small bowel                                                          Clinical Information   Date Value Ref Range Status   2023   Final    Intussusception of jejunum       Final Diagnosis   Date Value Ref Range Status   2023   Final    A. Abdominal scar, excision:  - Skin with dermal scarring  - Negative for dysplasia or malignancy    B. Segment of small bowel, segmentectomy:  - Small bowel with congestion and edema, consistent with intussusception  - Margins feature viable tissue  - Negative for malignancy              Imagin/15/2024 TTE:  Technically difficult study.Extremely poor acoustic windows.  Interpretation Summary: No vegetation or mass identified, however this does not exclude endocarditis.

## 2024-06-17 NOTE — PROGRESS NOTES
Notified of gram + cocci in peripheral Bcx from 6/15.   On Cefazolin (narrowed down from vanco)  Line removed  Repeat Bcx already ordered  No changes in plan

## 2024-06-17 NOTE — PLAN OF CARE
Hours of care: 5107-5325  Problem: Infection  Goal: Absence of Infection Signs and Symptoms  Outcome: Not Progressing     Shift events:  -Remains up ad vitor, out for regular walks and to smoke (cigarettes)  -Platelet recheck normalized  -Blood cultures remain positive; continue to monitor for 48 hours negative BCx before central line replaced  -Continuing to provide PRN pain medication q4h per pt request (for chronic back and abdomen pain)  -Spouse visited this evening, remains supportive at bedside

## 2024-06-17 NOTE — PLAN OF CARE
Cares from: [id number]     V/S & pain: vitally stable,abdominal and lower back pain   Neuro: alert, orientedx4  Respiratory:on room air, denies  and denies sob  Skin: L Lateral calf linear abrasion, L upper abdomen drain, scabs on both arms  GI/: L upper abdomen drain-clamped  Nutrition: regular diet  Lines/drains: RPIV   Activity: Independent     Events this shift: pt. Frequently goes outside. Informed pt with the hospital protocol. Pt refused ongoing IVF of NSS +KCL 20mEq/L infusion. Stopped per pt request and notified MD. Pt. Complain of pain at the  present IV site, present IV access removed. 1st attempt of IV insertion was unsuccessful, pt requested vascular team to put new IV access on his L anterior upper arm and was successful. T pump provided, pt reported it did helped with abdominal pain.  0500h Lab reported.. Positive blood culture at L hand 6/15 at 10:58 am gram positive cocci in clusters. Relayed to MD  call light w/in reach and able to make needs known, will continue to monitor.  No acute event this shift.     Plan:  continue antibiotics

## 2024-06-17 NOTE — PROGRESS NOTES
Patient does have coverage for iv abx through their [facility] Medicare Advantage plan, however the drug must be on a CADD pump for coverage. If not on a CADD pump, then the patient would be self-pay due to no coverage. Patient has an 80/20 coverage until they have met their out of pocket of $2900 (patient has met $2900 at this time).   Based on Cefazolin, it can go on a CADD so patient should be covered.     For nursing, patient should have coverage if homebound, however [facility] is not contracted with Medicare and an outside nursing agency would be utilized instead. If patient is not homebound, there is no coverage and [facility] can see patient if patient agrees to self-pay for $90 per visit.    Patient should have coverage in a TCU or infusion center.    ([facility]) In reference to admission date 06/15/2024.    Please contact Intake with any questions, [phone] or In Basket pool, [facility] ([phone]).

## 2024-06-17 NOTE — PROVIDER NOTIFICATION
MD name:Mike Alvarez MD Vocera: Lab reported.. Positive blood culture at [facility] 6/15 at 10:58 am gram positive cocci in clusters.    MD reply:Ty. No change in plans

## 2024-06-17 NOTE — PROGRESS NOTES
"CLINICAL NUTRITION SERVICES - BRIEF NOTE     Nutrition Prescription     RECOMMENDATIONS FOR MDs/PROVIDERS TO ORDER:  PO diet is for comfort - with pt's GI surgical history, short-gut he has limited nutrient absorption   Recommend initiation of peripheral parenteral nutrition until able to resume TPN via central access  Pt reports not wanting to start PPN  at this time   Please re-attempt central IV access as able/medically appropriate with goal to transition back to central parenteral nutrition when able.   Please send consult Pharmacy/Nutrition to start and manage PN and specify line type (peripheral v central) in consult.     Recommendations already ordered by Registered Dietitian (RD):  Ensure BID per pt request      Future/Additional Recommendations:  If POC is to initiate short-term peripheral parenteral nutrition:  -Recommend only using Peripheral PN for short-term nutrition support, ~1-2 week duration    Dosing weight: 87 kg  Access: peripheral line     Initial parameters (per day)  ClinimixE 4.25% AA, 5% dextrose concentration (peripheral line)    Volume: Rate of 83 mL/hr (1992 mL/day)  Dextrose: 100 g (amount per Clinimix at 83 mL/hr)  AA: 85 g (amount per Clinimix at 83 mL/hr)  Lipids: 250 mL 20%, 5 days per week [M-F]     Dextrose titration:   None needed with peripheral Clinimix     Additives: Standard Infuvite and Trace minerals + 100 mg B1/Thiamine x7 days      - PPN will provide: 100g Dex daily (340 kcal, GIR 0.8 mg/kg/min), 85 g AA daily (340 kcal), and 250 ml 20% IV lipids daily.  This provides 1180 kcals (14 kcal/kg/day), 1.0 g PRO/kg/day, GIR 0.8 mg/kg/min with 42% kcals from Fat.    Once able to transition back to central PN, pt's home PN regimen as follows:  - \"Plain\" TPN bag 4x/week (T/Th/Sat/Sun): 1250 mL x 12 hrs with 175 g dex and 100 g AA   - TPN+ Lipids 3x/week (M/W/F): 1000 mL x 12 hrs with 175 g dex, 100 g AA, and 250 mL 20% Intralipid daily   - FHI dosing weight 86.8 kg      7-day avg " intake from TPN = 1209 kcal (14 kcal/kg), 1.15 g/kg PRO, and 18% kcal from fat per FHI dosing weight 86.8 kg   For last full RD assessment, see note dated 6/15/24    NEW FINDINGS   Pt reports he does not want to start PPN d/t concern for 'blowing' veins   PTA pt had not been taking full TPN regimen for about 2 weeks. Reports he is no longer working w/ [employer], believes his new infusion company may be [employer]   Eats 'bites'  of items, but never full meals. Likes Ensure, will start BID.     MALNUTRITION  % Intake: </= 50% for >/= 5 days (severe)  % Weight Loss: None noted  Subcutaneous Fat Loss: Facial region:  moderate and Upper arm:  moderate   Muscle Loss: Temporal:  mild, Facial & jaw region:  moderate, Thoracic region (clavicle, acromium bone, deltoid, trapezius, pectoral):  mild, and Upper arm (bicep, tricep):  mild  Fluid Accumulation/Edema: Mild  Malnutrition Diagnosis: Severe malnutrition in the context of chronic illness/disease      INTERVENTIONS  Implementation  Collaboration with other providers  Medical food supplement therapy  Parenteral Nutrition/IV Fluids - recommendations       Monitoring/Evaluation  Will continue to monitor and evaluate per protocol.    Fifi Pierre, MPH, RDN, LD  7C ([id number]) and ([phone]) VIRI Somers [7C Clinical Dietitian]  Weekend/Holiday: [place] - Weekend Clinical Dietitian

## 2024-06-17 NOTE — PROGRESS NOTES
[facility]    Medicine Progress Note - Hospitalist Service, GOLD TEAM 9    Date of Admission:  6/14/2024    Assessment & Plan     Parker Acevedo is a 53 year old man with history including RNY gastric bypass c/b malnutrition on TPN who was admitted on 6/15/2024. He presented to his PCP with fever 10 days prior to admission and S epidermidis was isolated from a single blood culture from that day (6/3/24), admitted for antibiotics and further evaluation.     #Suspected CLABSI (S epidermidis) (POA)  #TPN dependence    Initially presented to clinic with fever 6/3, blood culture collected isolating S epidermidis.  Was started on levofloxacin for unclear duration prior to admission.  Blood culture from 6/14-15 positive and Cm tip positive for S epi, but cultures from 6/16 and 6/17 NGTD.    - TTE negative for vegetation.  - ID consulted, appreciate recs   -  cefazolin 2g q6h   - daily blood culture until negative x2  - consider line replacement when cultures negative x 48h  - if cultures from 6/16 or after are positive, will obtain RONEL        # History of severe obesity s/p gastric bypass (2002) complicated by severe malnutrition and short gut syndrome  # Venting G tube      Chronic TPN dependence, will need stable IV access (or at least a plan) prior to discharge  - Appreciate RD assistance  - consider discussing PEG replacement with surgery.  - Hold TPN for now  - RD consulted, appreciate recs   - oral diet with supplements for now, but not a long term solution.   - consider PPN in a day or two       Hypokalemia  - RN replacement protocol     Chronic pain  - Continue fentanyl patch q48h as prescribed PTA, oxycodone q6h as PTA  ADHD - PTA amphetamine-dextroamphetamine not currently ordered  Paroxysmal Afib - Continue carvedilol 12.5mg BID  DVT - Continue enoxaparin as prescribed PTA   ADHD: cont home adderall          Diet: Combination Diet Regular Diet  "Adult  Snacks/Supplements Adult: Ensure Enlive; Between Meals    DVT Prophylaxis: Enoxaparin (Lovenox) SQ  Sanchez Catheter: Not present  Lines: None       Cardiac Monitoring: None  Code Status: Full Code      Clinically Significant Risk Factors          # Hypocalcemia: Lowest Ca = 8.1 mg/dL in last 2 days, will monitor and replace as appropriate     # Hypoalbuminemia: Lowest albumin = 3.3 g/dL at 6/16/2024  6:13 AM, will monitor as appropriate   # Thrombocytopenia: Lowest platelets = 126 in last 2 days, will monitor for bleeding                # Overweight: Estimated body mass index is 26.82 kg/m  as calculated from the following:    Height as of this encounter: 1.803 m (5' 11\").    Weight as of this encounter: 87.2 kg (192 lb 4.8 oz)., PRESENT ON ADMISSION  # Severe Malnutrition: based on nutrition assessment, PRESENT ON ADMISSION     # Financial/Environmental Concerns: none         Disposition Plan     Medically Ready for Discharge: Anticipated in 2-4 Days             Lisandro Box MD  Hospitalist Service, GOLD TEAM 9  [facility]  Securely message with [email or web address] (more info)  Text page via [facility] Paging/Directory   See signed in provider for up to date coverage information  ______________________________________________________________________    Interval History   Feeling ok overall.  Wants to go home asap, although he understands why he is in the hospital.  No fevers or chills.      Physical Exam   Vital Signs: Temp: 98.6  F (37  C) Temp src: Oral BP: 114/74 Pulse: 72   Resp: 18 SpO2: 99 % O2 Device: None (Room air)    Weight: 192 lbs 4.8 oz    General Appearance: Standing up, comfortable  Respiratory: clear to auscultation bilaterally with good air entry   Cardiovascular: normal rate, regular rhythm. No murmurs, rubs, or gallops   GI: soft, PEG in place.  Ventral hernia present, mildly tenderness to palpation   Skin: no rash  Other: No lower extremity edema. "     Medical Decision Making             Data     I have personally reviewed the following data over the past 24 hrs:    N/A  \   N/A   / 228     N/A N/A N/A /  N/A   4.3 N/A N/A \       Imaging results reviewed over the past 24 hrs:   No results found for this or any previous visit (from the past 24 hour(s)).

## 2024-06-18 LAB
ANION GAP SERPL CALCULATED.3IONS-SCNC: 9 MMOL/L (ref 7–15)
BACTERIA BLD CULT: ABNORMAL
BACTERIA SPEC CULT: ABNORMAL
BACTERIA SPEC CULT: ABNORMAL
BASOPHILS # BLD AUTO: 0 10E3/UL (ref 0–0.2)
BASOPHILS NFR BLD AUTO: 1 %
BUN SERPL-MCNC: 12.1 MG/DL (ref 6–20)
CALCIUM SERPL-MCNC: 8.6 MG/DL (ref 8.6–10)
CHLORIDE SERPL-SCNC: 104 MMOL/L (ref 98–107)
CREAT SERPL-MCNC: 0.78 MG/DL (ref 0.67–1.17)
DEPRECATED HCO3 PLAS-SCNC: 27 MMOL/L (ref 22–29)
EGFRCR SERPLBLD CKD-EPI 2021: >90 ML/MIN/1.73M2
EOSINOPHIL # BLD AUTO: 0.3 10E3/UL (ref 0–0.7)
EOSINOPHIL NFR BLD AUTO: 5 %
ERYTHROCYTE [DISTWIDTH] IN BLOOD BY AUTOMATED COUNT: 15.7 % (ref 10–15)
GLUCOSE SERPL-MCNC: 89 MG/DL (ref 70–99)
GRAM STAIN RESULT: ABNORMAL
HCT VFR BLD AUTO: 33.9 % (ref 40–53)
HGB BLD-MCNC: 11 G/DL (ref 13.3–17.7)
IMM GRANULOCYTES # BLD: 0 10E3/UL
IMM GRANULOCYTES NFR BLD: 0 %
LYMPHOCYTES # BLD AUTO: 2.3 10E3/UL (ref 0.8–5.3)
LYMPHOCYTES NFR BLD AUTO: 36 %
MCH RBC QN AUTO: 30.1 PG (ref 26.5–33)
MCHC RBC AUTO-ENTMCNC: 32.4 G/DL (ref 31.5–36.5)
MCV RBC AUTO: 93 FL (ref 78–100)
MONOCYTES # BLD AUTO: 0.8 10E3/UL (ref 0–1.3)
MONOCYTES NFR BLD AUTO: 13 %
NEUTROPHILS # BLD AUTO: 2.9 10E3/UL (ref 1.6–8.3)
NEUTROPHILS NFR BLD AUTO: 45 %
NRBC # BLD AUTO: 0 10E3/UL
NRBC BLD AUTO-RTO: 0 /100
PLATELET # BLD AUTO: 190 10E3/UL (ref 150–450)
POTASSIUM SERPL-SCNC: 3.7 MMOL/L (ref 3.4–5.3)
RBC # BLD AUTO: 3.66 10E6/UL (ref 4.4–5.9)
SODIUM SERPL-SCNC: 140 MMOL/L (ref 135–145)
WBC # BLD AUTO: 6.5 10E3/UL (ref 4–11)

## 2024-06-18 PROCEDURE — 250N000013 HC RX MED GY IP 250 OP 250 PS 637

## 2024-06-18 PROCEDURE — 250N000013 HC RX MED GY IP 250 OP 250 PS 637: Performed by: STUDENT IN AN ORGANIZED HEALTH CARE EDUCATION/TRAINING PROGRAM

## 2024-06-18 PROCEDURE — 250N000011 HC RX IP 250 OP 636: Mod: JZ | Performed by: PEDIATRICS

## 2024-06-18 PROCEDURE — 99233 SBSQ HOSP IP/OBS HIGH 50: CPT | Mod: 24 | Performed by: STUDENT IN AN ORGANIZED HEALTH CARE EDUCATION/TRAINING PROGRAM

## 2024-06-18 PROCEDURE — 120N000002 HC R&B MED SURG/OB UMMC

## 2024-06-18 PROCEDURE — 99232 SBSQ HOSP IP/OBS MODERATE 35: CPT | Performed by: STUDENT IN AN ORGANIZED HEALTH CARE EDUCATION/TRAINING PROGRAM

## 2024-06-18 PROCEDURE — 87040 BLOOD CULTURE FOR BACTERIA: CPT | Performed by: PEDIATRICS

## 2024-06-18 PROCEDURE — 80048 BASIC METABOLIC PNL TOTAL CA: CPT | Performed by: STUDENT IN AN ORGANIZED HEALTH CARE EDUCATION/TRAINING PROGRAM

## 2024-06-18 PROCEDURE — 250N000013 HC RX MED GY IP 250 OP 250 PS 637: Performed by: PEDIATRICS

## 2024-06-18 PROCEDURE — 250N000011 HC RX IP 250 OP 636: Performed by: STUDENT IN AN ORGANIZED HEALTH CARE EDUCATION/TRAINING PROGRAM

## 2024-06-18 PROCEDURE — 85025 COMPLETE CBC W/AUTO DIFF WBC: CPT | Performed by: STUDENT IN AN ORGANIZED HEALTH CARE EDUCATION/TRAINING PROGRAM

## 2024-06-18 PROCEDURE — 36415 COLL VENOUS BLD VENIPUNCTURE: CPT | Performed by: PEDIATRICS

## 2024-06-18 RX ORDER — TRAZODONE HYDROCHLORIDE 50 MG/1
50 TABLET, FILM COATED ORAL
Status: DISCONTINUED | OUTPATIENT
Start: 2024-06-18 | End: 2024-06-19 | Stop reason: HOSPADM

## 2024-06-18 RX ORDER — FENTANYL 25 UG/1
25 PATCH TRANSDERMAL
Status: DISCONTINUED | OUTPATIENT
Start: 2024-06-18 | End: 2024-06-19 | Stop reason: HOSPADM

## 2024-06-18 RX ADMIN — SUCRALFATE 1 G: 1 SUSPENSION ORAL at 16:39

## 2024-06-18 RX ADMIN — OXYCODONE HYDROCHLORIDE 10 MG: 5 SOLUTION ORAL at 02:35

## 2024-06-18 RX ADMIN — FENTANYL 1 PATCH: 25 PATCH TRANSDERMAL at 08:06

## 2024-06-18 RX ADMIN — OXYCODONE HYDROCHLORIDE 10 MG: 5 SOLUTION ORAL at 15:38

## 2024-06-18 RX ADMIN — ALPRAZOLAM 0.5 MG: 0.25 TABLET ORAL at 08:01

## 2024-06-18 RX ADMIN — ONDANSETRON 8 MG: 8 TABLET, ORALLY DISINTEGRATING ORAL at 23:13

## 2024-06-18 RX ADMIN — ENOXAPARIN SODIUM 80 MG: 80 INJECTION SUBCUTANEOUS at 22:43

## 2024-06-18 RX ADMIN — SUCRALFATE 1 G: 1 SUSPENSION ORAL at 12:18

## 2024-06-18 RX ADMIN — OXYCODONE HYDROCHLORIDE 10 MG: 5 SOLUTION ORAL at 23:13

## 2024-06-18 RX ADMIN — CARVEDILOL 12.5 MG: 12.5 TABLET, FILM COATED ORAL at 18:46

## 2024-06-18 RX ADMIN — ONDANSETRON 8 MG: 8 TABLET, ORALLY DISINTEGRATING ORAL at 15:38

## 2024-06-18 RX ADMIN — CEFAZOLIN SODIUM 2 G: 2 INJECTION, SOLUTION INTRAVENOUS at 22:43

## 2024-06-18 RX ADMIN — SUCRALFATE 1 G: 1 SUSPENSION ORAL at 08:01

## 2024-06-18 RX ADMIN — ENOXAPARIN SODIUM 80 MG: 80 INJECTION SUBCUTANEOUS at 08:03

## 2024-06-18 RX ADMIN — ONDANSETRON 8 MG: 8 TABLET, ORALLY DISINTEGRATING ORAL at 02:35

## 2024-06-18 RX ADMIN — CARVEDILOL 12.5 MG: 12.5 TABLET, FILM COATED ORAL at 08:01

## 2024-06-18 RX ADMIN — OXYCODONE HYDROCHLORIDE 10 MG: 5 SOLUTION ORAL at 19:52

## 2024-06-18 RX ADMIN — CEFAZOLIN SODIUM 2 G: 2 INJECTION, SOLUTION INTRAVENOUS at 14:44

## 2024-06-18 RX ADMIN — DEXTROAMPHETAMINE SACCHARATE, AMPHETAMINE ASPARTATE, DEXTROAMPHETAMINE SULFATE AND AMPHETAMINE SULFATE 20 MG: 2.5; 2.5; 2.5; 2.5 TABLET ORAL at 08:01

## 2024-06-18 RX ADMIN — ACETAMINOPHEN 325 MG: 325 TABLET, FILM COATED ORAL at 15:38

## 2024-06-18 RX ADMIN — OXYCODONE HYDROCHLORIDE 10 MG: 5 SOLUTION ORAL at 06:34

## 2024-06-18 RX ADMIN — SUCRALFATE 1 G: 1 SUSPENSION ORAL at 22:43

## 2024-06-18 RX ADMIN — ALPRAZOLAM 0.5 MG: 0.25 TABLET ORAL at 22:43

## 2024-06-18 RX ADMIN — CEFAZOLIN SODIUM 2 G: 2 INJECTION, SOLUTION INTRAVENOUS at 06:34

## 2024-06-18 RX ADMIN — OXYCODONE HYDROCHLORIDE 10 MG: 5 SOLUTION ORAL at 11:39

## 2024-06-18 ASSESSMENT — ACTIVITIES OF DAILY LIVING (ADL)
ADLS_ACUITY_SCORE: 30

## 2024-06-18 NOTE — PLAN OF CARE
"[id number]    PT did have some behaviors issues on this shift, not easily redirected. Had ask writer to place new fentanyl patch on, when writer stated to pt\" your patch is actually due every 72 hours. So tomorrow we will place a new one on. PT got agitated, started to screamed out  and shout,  stating  to writer\" you think I'm lairing to you, pls called the doctor. Also pt requested, PRN oxycodone, stating he was pain, when writer brought medications,  pt stated to writer, did you bring my xaxan? Writer stated,  no I didn't, and then he continue to say, I though it was schedule. And became more agitated. Then requested for writer to stop IV ABX, writer called charge nurse to talk to patient.                         "

## 2024-06-18 NOTE — PROGRESS NOTES
[facility] DISEASES: PROGRESS NOTE      Patient:  Parker Acevedo   YOB: 1970, MRN: [id number]  Date of Visit: 06/18/2024  Date of Admission: 6/14/2024  Consult Requester: Oleg Reddy DO          Assessment and Recommendations:     ID Problem List:  Bacteremia, growth of MSSE likely 2/2 CVC s/p removal 6/15/2024  Positive blood cultures 6/6, MSSE  Positive blood cultures 6/14 - 6/15; negative since 6/16  6/15/2024 tip culture grew MSSE  Fever, self reported at home Tmax 102F - afebrile this admission  Hx of recurrent bacteremia, likely CLABSI   TPN dependent, chronic CVC 2/2 bariatric surgery with G-tube in place  Hx of DVT  C-spine hardware  Current smoking  Hx of multiple antibiotic allergy  Penicillin - causes anaphylaxis, but has tolerated cephalosporins in past including cefazolin  Vancomycin- causes rash, ?mariella syndrome. Has tolerated slower infusion with diphenhydramine.  Doxycycline - causes rash  Ertapenem - nausea, vomiting  TMP-SMX, causes rash    Discussion:  52 yo M with MSSE bacteremia, likely 2/2 CLABSI. CVC has removed, pending culture. Blood cultures remained positive. Since CVC remove, blood cultures that are obtained afterwards remained negative for 48 hours. Can stop daily blood cultures now.. Given Staph epidermidis is MSSE this admission (as similar to isolate on 6/6), recommend to stop iv vancomycin and start iv cefazolin. Noted TTE did not show endocarditis, although technically difficult study. If persistent bacteremia, despite CVC removal then will have to consider RONEL. No sings of secondary seeding.     On today's visit, remains clinically well. Tolerating cefazolin without side effects reported. If blood cultures 6/16, remained negative for 72 hours, then can proceed with Cm placement on 6/19.     Recommendations:  Can stop daily blood cultures   Repeat blood cultures remained negative since 6/16  Continue iv cefazolin 2g q8h   Anticipate 2  weeks course from first negative blood cultures  Can proceed with Cm placement per IR on 6/19 (assuming blood cultures 6/16 remained negative.     Recommendations are discussed with the primary team.     ID team will continue to follow. Please reach out if any questions or concerns.     Total time spent during this encounter (chart review, documentation, MDM, coordination of care): 50 minutes    Luisa Dominique MD   Infectious Diseases Staff Physician  Pager: [phone]  [place]   06/18/2024         Interval History and Events:     Seen and examined, accompanied by wife at bedside - doing well overall, no new complaints are reported today.         HPI as adopted from initial ID consult on 6/15/2024:     Parker Acevedo is a 52 yo M with PMHx of Celestino-en-y gastric bypass c/b short gut syndrome, now TPN-dependent via CVC, h/o DVT, hx of recurrent CLABSI, who presented to ER 6/14/2024 due to positive blood cultures 6/6 on outpatient fever work up.      Per patient, has been having fever, chills especially with use of CVC for TPN since past ~3 weeks. Also notes irriation around catheter insertion site since past one week. He was seen as outpatient primary care 6/6, whereby blood cultures were obtained which resulted positive, with growth of MSSE. Was told to go to ER then, and arrived on 6/14 and being admitted for bacteremia management. Afebrile, relatively stable vitals. Repeat blood cultures GPC in clusters, pending verigene. Started on levofloxacin based on previous blood cultures (Jan, 2024). ID consult is requested for antibiotic and bacteremia management.     SHx:  Lives with wife. Recently moved to own house ~3 weeks ago, prior to that was staying in hotel. Has 2 pet dogs (healthy corgi). Current smoking.          Review of Systems:   Targeted 10 point ROS was completed with pertinent positives and negatives are detailed above.         Antimicrobial history:     Current:  Vancomycin 6/15 - present          "Physical Examination:     Vital signs:  /74 (BP Location: Right arm)   Pulse 78   Temp 98.2  F (36.8  C) (Oral)   Resp 14   Ht 1.803 m (5' 11\")   Wt 87.2 kg (192 lb 4.8 oz)   SpO2 99%   BMI 26.82 kg/m      GENERAL: alert and no distress  NECK: no adenopathy, no asymmetry, masses, or scars  RESP: lungs clear to auscultation - no rales, rhonchi or wheezes  CV: regular rate and rhythm, normal S1 S2, no S3 or S4, no murmur, click or rub, no peripheral edema  ABDOMEN: soft, nontender, no hepatosplenomegaly, no masses and bowel sounds normal  MS: no gross musculoskeletal defects noted, no edema  SKIN: left upper chest CVC removal site covered in dressing, w/o oozing or bleeding    Lines and devices:    PIV CDI, non-tender, no surrounding erythema.    Labs, Microbiology and Imaging studies reviewed.          Laboratory Data:       Microbiology:    Culture   Date Value Ref Range Status   06/18/2024 No growth after 12 hours  Preliminary   06/18/2024 No growth after 12 hours  Preliminary   06/17/2024 No growth after 1 day  Preliminary   06/17/2024 No growth after 1 day  Preliminary   06/16/2024 No growth after 2 days  Preliminary   06/16/2024 No growth after 2 days  Preliminary   06/15/2024 15-50 CFU Staphylococcus epidermidis (A)  Preliminary   06/15/2024 <15 CFU Staphylococcus epidermidis (A)  Preliminary   06/15/2024 Positive on the 1st day of incubation (A)  Preliminary   06/15/2024 Staphylococcus epidermidis (AA)  Preliminary     Comment:     2 of 2 bottles  Susceptibilities done on previous cultures   06/15/2024 Staphylococcus epidermidis (AA)  Preliminary     Comment:     1 of 2 bottles  Susceptibilities done on previous cultures   06/15/2024 Positive on the 2nd day of incubation (A)  Preliminary   06/15/2024 Staphylococcus epidermidis (AA)  Preliminary     Comment:     Susceptibilities done on previous cultures   06/15/2024 Staphylococcus epidermidis (AA)  Preliminary     Comment:     Susceptibilities " done on previous cultures   06/15/2024 No growth after 3 days  Preliminary   06/14/2024 Positive on the 1st day of incubation (A)  Final   06/14/2024 Staphylococcus epidermidis (AA)  Final     Comment:     2 of 2 bottles  Susceptibilities done on previous cultures   06/14/2024 Positive on the 1st day of incubation (A)  Final   06/14/2024 Staphylococcus epidermidis (AA)  Final     Comment:     2 of 2 bottles  Susceptibilities done on previous cultures   06/14/2024 Positive on the 1st day of incubation (A)  Final   06/14/2024 Staphylococcus epidermidis (AA)  Final     Comment:     2 of 2 bottles   06/14/2024 Staphylococcus epidermidis (AA)  Final     Comment:     1 of 2 bottles   06/03/2024 Positive on the 1st day of incubation (A)  Final   06/03/2024 Staphylococcus epidermidis (AA)  Final     Comment:     2 of 2 bottles   01/22/2024 No Growth  Final   01/22/2024 No Growth  Final   01/22/2024 <15 CFU Staphylococcus epidermidis (A)  Final     Comment:     Susceptibilities not routinely done, refer to antibiogram to view typical susceptibility profiles   01/20/2024 No Growth  Final   01/20/2024 No Growth  Final   01/19/2024 Positive on the 1st day of incubation (A)  Final   01/19/2024 Enterococcus faecalis (AA)  Final     Comment:     1 of 2 bottles  Susceptibilities done on previous cultures   01/19/2024 Positive on the 1st day of incubation (A)  Final   01/19/2024 Escherichia coli (AA)  Final     Comment:     2 of 2 bottles  Susceptibilities done on previous cultures   01/19/2024 Enterococcus faecalis (AA)  Final     Comment:     2 of 2 bottles  Susceptibilities done on previous cultures   01/19/2024 Positive on the 1st day of incubation (A)  Final   01/19/2024 Escherichia coli (AA)  Final     Comment:     2 of 2 bottles  Susceptibilities done on previous cultures   01/19/2024 Enterococcus faecalis (AA)  Final     Comment:     2 of 2 bottles  Susceptibilities done on previous cultures   01/18/2024 Positive on the 1st  day of incubation (A)  Final   01/18/2024 Escherichia coli (AA)  Final     Comment:     2 of 2 bottles  Susceptibilities done on previous cultures   01/18/2024 Enterococcus faecalis (AA)  Final     Comment:     2 of 2 bottles  Susceptibilities done on previous cultures   01/18/2024 Positive on the 1st day of incubation (A)  Final   01/18/2024 Escherichia coli (AA)  Final     Comment:     2 of 2 bottles   01/18/2024 Enterococcus faecalis (AA)  Final     Comment:     2 of 2 bottles   01/18/2024 No Growth  Final   11/16/2023 No Growth  Final   11/16/2023 No Growth  Final   11/14/2023 <15 CFU Klebsiella pneumoniae (A)  Final     Comment:     Susceptibilities done on previous cultures   11/14/2023 No Growth  Final   11/14/2023 Positive on the 1st day of incubation (A)  Final   11/14/2023 Klebsiella pneumoniae (AA)  Final     Comment:     2 of 2 bottles  Susceptibilities done on previous cultures   11/14/2023 Positive on the 1st day of incubation (A)  Final   11/14/2023 Klebsiella pneumoniae (AA)  Final     Comment:     2 of 2 bottles  Susceptibilities done on previous cultures   11/11/2023 No Growth  Final   11/11/2023 Positive on the 1st day of incubation (A)  Final   11/11/2023 Klebsiella pneumoniae (AA)  Final     Comment:     2 of 2 bottles   08/07/2023 No Growth  Final   08/07/2023 No Growth  Final   08/06/2023 No Growth  Final   08/06/2023 No Growth  Final   08/04/2023 Positive on the 1st day of incubation (A)  Final   08/04/2023 Klebsiella pneumoniae (AA)  Final     Comment:     2 of 2 bottles   08/04/2023 No Growth  Final   07/07/2023 No Growth  Final   07/06/2023 No Growth  Final   07/06/2023 No Growth  Final   07/06/2023 No Growth  Final   07/05/2023 No Growth  Final   07/05/2023 No Growth  Final   07/04/2023 50-75 CFU Staphylococcus hominis (A)  Final   07/04/2023 Positive on the 1st day of incubation (A)  Final   07/04/2023 Staphylococcus epidermidis (AA)  Final     Comment:     2 of 2 bottlesSusceptibilities  done on previous cultures   07/04/2023 No Growth  Final   07/03/2023 Positive on the 1st day of incubation (A)  Final   07/03/2023 Staphylococcus epidermidis (AA)  Final     Comment:     2 of 2 bottles   07/03/2023 Positive on the 1st day of incubation (A)  Final   07/03/2023 Staphylococcus epidermidis (AA)  Final     Comment:     1 of 2 bottles   07/03/2023 Staphylococcus epidermidis (AA)  Final     Comment:     1 of 2 bottles  Susceptibilities done on previous cultures   06/10/2023 No Growth  Final   06/10/2023 No Growth  Final   06/05/2023 No Growth  Final   06/04/2023 Positive on the 1st day of incubation (A)  Final   06/04/2023 Klebsiella pneumoniae (AA)  Final     Comment:     1 of 2 bottles  Susceptibilities done on previous culture   06/04/2023 Enterococcus faecalis (AA)  Final     Comment:     1 of 2 bottlesSusceptibilities done on previous cultures   06/04/2023 Klebsiella oxytoca (AA)  Final     Comment:     1 of 2 bottles  Susceptibilities done on previous cultures     06/04/2023 Positive on the 1st day of incubation (A)  Final   06/04/2023 Klebsiella pneumoniae (AA)  Final     Comment:     2 of 2 bottles   06/04/2023 Enterococcus faecalis (AA)  Final     Comment:     2 of 2 bottles   06/04/2023 Klebsiella oxytoca (AA)  Final     Comment:     2 of 2 bottles     05/12/2023 No Growth  Final   05/12/2023 No Growth  Final   01/21/2023 No Growth  Final   01/21/2023 No Growth  Final   07/16/2022 No Growth  Final   07/16/2022 No Growth  Final   07/16/2022 No Growth  Final   07/09/2022 No Growth  Final   07/09/2022 No Growth  Final   07/08/2022 No Growth  Final   07/08/2022 No Growth  Final   07/08/2022 No Growth  Final     Inflammatory Markers:   Recent Labs   Lab Test 07/04/23  1556 10/04/22  1440 07/16/22  2216 05/07/22  1423 03/15/22  1442 02/23/22  1621 01/08/22  1430 11/16/21  1300 08/24/21  0855 11/17/20  1445 07/28/20  1607 06/28/19  1345 05/14/19  1145 02/12/18  1400 01/23/18  0845 01/20/18  1030  10/02/17  1030 09/24/17  1900   SED 17  --  18*  --   --  10  --   --   --   --  10  --  13  --  10 11  --  31*   CRP  --  <2.9 4.4 34.0* <2.9 <2.9 52.0* <2.9 <2.9   < > <2.9   < >  --    < > <2.9 5.4   < > 26.6*    < > = values in this interval not displayed.     Urine Studies:    Recent Labs   Lab Test 01/22/24  0048 11/11/23  0042 08/06/23  1056 06/05/23  0820 01/21/23  0916   LEUKEST Negative Negative Negative Negative Negative   WBCU 1 1 1 4 0     Hematology Studies:    Recent Labs   Lab Test 06/18/24  0604 06/17/24  1120 06/16/24  0613 06/14/24  2157 06/03/24  1645 05/31/24  1519 04/17/24  1328 03/05/24  1245 07/13/21  1110 06/29/21  1016 06/14/21  1017 06/09/21  1044 06/01/21  1117 05/25/21  1147 05/19/21  1342 05/18/21  1015   WBC 6.5  --  7.8 8.2 11.3* 6.4 6.1 5.2   < > 6.9 8.1 7.5 7.3   < > 6.1 6.2   ANEU  --   --   --   --   --   --   --   --   --  3.1 4.1 4.7 4.3  --  3.5 3.7   AEOS  --   --   --   --   --   --   --   --   --  0.2 0.2 0.3 0.1  --  0.2 0.2   HGB 11.0*  --  11.3* 12.5*  --  11.0* 12.8* 11.5*   < > 12.1* 12.0* 13.3 12.1*   < > 12.5* 12.6*   MCV 93  --  92 89  --  93 88 85   < > 96 97 96 94   < > 97 98    228 126* 217  --  153 205 179   < > 178 158 169 174   < > 159 145*    < > = values in this interval not displayed.      Metabolic Studies:   Recent Labs   Lab Test 06/18/24  0604 06/17/24  0703 06/16/24 0613 06/14/24 2157 05/31/24  1519 04/17/24  1328     --  140 141 141 144   POTASSIUM 3.7 4.3 3.8 3.1* 3.7 4.0   CHLORIDE 104  --  106 106 105 109*   CO2 27  --  24 23 28 26   BUN 12.1  --  9.3 5.2* 12.8 12.8   CR 0.78  --  0.83 0.77 0.75 0.71   GFRESTIMATED >90  --  >90 >90 >90 >90     Hepatic Studies:   Recent Labs   Lab Test 06/16/24  0613 06/14/24 2157 05/31/24  1519 04/17/24  1328 03/05/24  1245 02/05/24  0843   BILITOTAL 0.6 0.3 0.4  0.4 0.2 0.4 0.2   ALKPHOS 91 88 88 88 69 68   ALBUMIN 3.3* 3.5 3.7 3.7 4.0 3.9   AST 23 26 20 18 16 14   ALT 20 22 20 20 14 9      Hepatitis B Testing:   Recent Labs   Lab Test 22  1400 22  1718 22  0552   HBCAB  --  Nonreactive  --    HEPBANG Nonreactive  --  Reactive*   HBEABY  --  Negative  --    HBEAGN  --  Negative  --      Hepatitis C Testing:   Hepatitis C Antibody   Date Value Ref Range Status   2022 Nonreactive Nonreactive Final   2017 Nonreactive NR^Nonreactive Final     Comment:     Assay performance characteristics have not been established for newborns,   infants, and children             Last Pathology Report   Case Report   Date Value Ref Range Status   2023   Final    Surgical Pathology Report                         Case: [id number]                                  Authorizing Provider:  Delvis Mercado MD     Collected:           2023 11:01 AM          Ordering Location:      MAIN OR                 Received:            10/02/2023 08:24 AM          Pathologist:           Scott Torres MD                                                             Specimens:   A) - Other, Previous abdominal scar skin                                                            B) - Other, Segment of small bowel                                                          Clinical Information   Date Value Ref Range Status   2023   Final    Intussusception of jejunum       Final Diagnosis   Date Value Ref Range Status   2023   Final    A. Abdominal scar, excision:  - Skin with dermal scarring  - Negative for dysplasia or malignancy    B. Segment of small bowel, segmentectomy:  - Small bowel with congestion and edema, consistent with intussusception  - Margins feature viable tissue  - Negative for malignancy              Imagin/15/2024 TTE:  Technically difficult study.Extremely poor acoustic windows.  Interpretation Summary: No vegetation or mass identified, however this does not  exclude endocarditis.

## 2024-06-18 NOTE — PLAN OF CARE
Goal Outcome Evaluation:      Plan of Care Reviewed With: patient    Overall Patient Progress: improvingOverall Patient Progress: improving    Outcome Evaluation: Pt A/Ox4, VSS on RA. Up indep in halls. Re gdiet but pt does not eat much r/t short gut syndrome. Pt BC have been NEG for over 48hrs (last positive 6/15). Plan is to place tunneled CVC tomorrow for TPN need and TID IV ancef. CC following for home infusion services. Pt verbalized needing PEG tube replace dwhile in hospital as well and noted MD is aware. Expected discharge date listed as 6/21 to home at this point.

## 2024-06-18 NOTE — PLAN OF CARE
"/84 (BP Location: Right arm)   Pulse 84   Temp 98.4  F (36.9  C) (Oral)   Resp 16   Ht 1.803 m (5' 11\")   Wt 87.2 kg (192 lb 4.8 oz)   SpO2 98%   BMI 26.82 kg/m      Care from: 8032-8214    VS & Pain: VSS, pain treated with PRN oxy  Neuro: A+Ox4  Respiratory: WDL, RA  Cardiac: WDL  Peripheral neurovascular: WDL  GI/: WDL  Nutrition: Regular diet   Skin: WDL  Lines: L PIV  Activity: Independent  Events this shift: Given PRN oxy x2; given PRN zofran,     Plan:  Continue POC      Goal Outcome Evaluation:      Plan of Care Reviewed With: patient    Overall Patient Progress: no changeOverall Patient Progress: no change    Outcome Evaluation: outside frequently to smoke;given oxy q4hr      "

## 2024-06-18 NOTE — PROGRESS NOTES
Care Management Follow Up    Length of Stay (days): 4  Expected Discharge Date: 06/21/2024  Concerns to be Addressed: discharge planning     Patient plan of care discussed at interdisciplinary rounds: Yes  Anticipated Discharge Disposition: Home  Anticipated Discharge Services:  Home Care, Home Infusion  Anticipated Discharge DME: Infusion Supplies  Patient/family educated on Medicare website which has current facility and service quality ratings:  n/a  Education Provided on the Discharge Plan: Yes  Patient/Family in Agreement with the Plan: yes    Referrals Placed by CM/SW:     [facility]  Phone: [phone] (ext [phone])  Fax: [phone]  TPN and hydration  Can also support Abx needs - IV cefazolin 2g q8h   Need orders by 1100 on day of discharge     Private pay costs discussed: Not applicable    Additional Information:  Ongoing POC; RNCC received call from [facility] home infusion agency, seen above; RNCC faxed updated clinical notes, per agency request. Dr. Luisa Dominique, OP ID, rounding this afternoon, noting pt has clearance, from ID perspective for new tunneled line 6/19, after 0700. RNCC to continue to follow and support safe discharge planning.       Hussain Nolen RN BSN  7C RNCC  Phone: [phone]  Pager: [phone]    For Weekend & Holiday on call RN Care Coordinator:  (Tasks: Home care, home infusion, medical equipment, transportation, IMM & CAPPS forms, etc.)  [place] (0800 - 1630) Saturday and Sunday    Units: 5A, 5B, & 5C: [phone]    Units: 6B, 6C, 6D: [phone]    Units: 7A, 7B, 7C, 7D: [phone]    Units: 6A/ICU : [phone]    [facility] [place] ([phone]) Saturday and Sunday    Units: 6 Med/Surg, 8A, 10 ICU, & Pediatric Units: [phone]    Units: 5 Ortho, 5Med/Surg & WB ED: [phone]

## 2024-06-18 NOTE — PROGRESS NOTES
[facility]    Medicine Progress Note - Hospitalist Service, GOLD TEAM 9    Date of Admission:  6/14/2024    Assessment & Plan   Parker Acevedo is a 53 year old man with history including RNY gastric bypass c/b malnutrition on TPN who was admitted on 6/15/2024. He presented to his PCP with fever 10 days prior to admission and S epidermidis was isolated from a single blood culture from that day (6/3/24), admitted for antibiotics and further evaluation.    Today's plan   -Plan to have azevedo placed if blood cultures remain negative for 48 hours for TPN   -Will likely need IV antibiotics on discharge as patient does not absorb well d/2 short gut syndrome      Suspected CLABSI (S epidermidis) (POA)  TPN dependence  Initially presented to clinic with fever 6/3, blood culture collected isolating S epidermidis.  Was started on levofloxacin for unclear duration prior to admission.  Blood culture from 6/14-15 positive and Azevedo tip positive for S epi, but cultures from 6/16 and 6/17 NGTD.     - TTE negative for vegetation.  - ID consulted, appreciate recs  - cefazolin 2g q6h  - daily blood culture until negative x2  - consider line replacement when cultures negative x 48h  - if cultures from 6/16 or after are positive, will obtain RONEL     History of severe obesity s/p gastric bypass (2002) complicated by severe malnutrition and short gut syndrome  Venting G tube      Chronic TPN dependence, will need stable IV access (or at least a plan) prior to discharge  - Appreciate RD assistance  - consider discussing PEG replacement with surgery.  - Hold TPN for now  - RD consulted, appreciate recs   - oral diet with supplements for now, but not a long term solution.   - consider PPN in a day or two        Hypokalemia  - RN replacement protocol     Chronic pain  - Continue fentanyl patch q48h as prescribed PTA, oxycodone q6h as PTA  ADHD - PTA amphetamine-dextroamphetamine not  "currently ordered  Paroxysmal Afib - Continue carvedilol 12.5mg BID  DVT - Continue enoxaparin as prescribed PTA   ADHD: cont home adderall          Diet: Combination Diet Regular Diet Adult  Snacks/Supplements Adult: Ensure Enlive; Between Meals    DVT Prophylaxis: Enoxaparin (Lovenox) SQ  Sanchez Catheter: Not present  Lines: None     Cardiac Monitoring: None  Code Status: Full Code      Clinically Significant Risk Factors              # Hypoalbuminemia: Lowest albumin = 3.3 g/dL at 6/16/2024  6:13 AM, will monitor as appropriate                  # Overweight: Estimated body mass index is 26.82 kg/m  as calculated from the following:    Height as of this encounter: 1.803 m (5' 11\").    Weight as of this encounter: 87.2 kg (192 lb 4.8 oz)., PRESENT ON ADMISSION  # Severe Malnutrition: based on nutrition assessment, PRESENT ON ADMISSION   # Financial/Environmental Concerns: none         Disposition Plan     Medically Ready for Discharge:              Timmy Campa MD  Hospitalist Service, GOLD TEAM 9  [facility]  Securely message with Vocera (more info)  Text page via [employer] Paging/Directory   See signed in provider for up to date coverage information  ______________________________________________________________________    Interval History   Patient is awake and alert, no new abdominal pain, difficulty breathing, fevers or malaise     Physical Exam   Vital Signs: Temp: 98.1  F (36.7  C) Temp src: Oral BP: 116/79 Pulse: 65   Resp: 16 SpO2: 98 % O2 Device: None (Room air)    Weight: 192 lbs 4.8 oz    Patient is awake and alert, was on his way for a walk   Lungs are clear   S1 and s2 heard   Abdomen was soft and non distended     Medical Decision Making       28 MINUTES SPENT BY ME on the date of service doing chart review, history, exam, documentation & further activities per the note.      Data     I have personally reviewed the following data over the past 24 hrs:    6.5  " \   11.0 (L)   / 190     140 104 12.1 /  89   3.7 27 0.78 \       Imaging results reviewed over the past 24 hrs:   No results found for this or any previous visit (from the past 24 hour(s)).

## 2024-06-19 ENCOUNTER — APPOINTMENT (OUTPATIENT)
Dept: INTERVENTIONAL RADIOLOGY/VASCULAR | Facility: CLINIC | Age: 54
DRG: 314 | End: 2024-06-19
Attending: PHYSICIAN ASSISTANT
Payer: COMMERCIAL

## 2024-06-19 ENCOUNTER — TELEPHONE (OUTPATIENT)
Dept: INFECTIOUS DISEASES | Facility: CLINIC | Age: 54
End: 2024-06-19
Payer: COMMERCIAL

## 2024-06-19 VITALS
BODY MASS INDEX: 26.92 KG/M2 | OXYGEN SATURATION: 97 % | WEIGHT: 192.3 LBS | TEMPERATURE: 98 F | HEIGHT: 71 IN | SYSTOLIC BLOOD PRESSURE: 139 MMHG | DIASTOLIC BLOOD PRESSURE: 93 MMHG | RESPIRATION RATE: 10 BRPM | HEART RATE: 72 BPM

## 2024-06-19 LAB
BACTERIA BLD CULT: ABNORMAL
HOLD SPECIMEN: NORMAL
POTASSIUM SERPL-SCNC: 4.3 MMOL/L (ref 3.4–5.3)

## 2024-06-19 PROCEDURE — 250N000013 HC RX MED GY IP 250 OP 250 PS 637

## 2024-06-19 PROCEDURE — 250N000013 HC RX MED GY IP 250 OP 250 PS 637: Performed by: PEDIATRICS

## 2024-06-19 PROCEDURE — 76937 US GUIDE VASCULAR ACCESS: CPT | Mod: 26 | Performed by: RADIOLOGY

## 2024-06-19 PROCEDURE — 250N000011 HC RX IP 250 OP 636: Mod: JZ | Performed by: PEDIATRICS

## 2024-06-19 PROCEDURE — 02H633Z INSERTION OF INFUSION DEVICE INTO RIGHT ATRIUM, PERCUTANEOUS APPROACH: ICD-10-PCS | Performed by: RADIOLOGY

## 2024-06-19 PROCEDURE — 250N000013 HC RX MED GY IP 250 OP 250 PS 637: Performed by: INTERNAL MEDICINE

## 2024-06-19 PROCEDURE — 36415 COLL VENOUS BLD VENIPUNCTURE: CPT | Performed by: STUDENT IN AN ORGANIZED HEALTH CARE EDUCATION/TRAINING PROGRAM

## 2024-06-19 PROCEDURE — 99233 SBSQ HOSP IP/OBS HIGH 50: CPT | Mod: 24 | Performed by: STUDENT IN AN ORGANIZED HEALTH CARE EDUCATION/TRAINING PROGRAM

## 2024-06-19 PROCEDURE — 250N000009 HC RX 250: Performed by: RADIOLOGY

## 2024-06-19 PROCEDURE — 36558 INSERT TUNNELED CV CATH: CPT | Mod: 58 | Performed by: RADIOLOGY

## 2024-06-19 PROCEDURE — 250N000013 HC RX MED GY IP 250 OP 250 PS 637: Performed by: STUDENT IN AN ORGANIZED HEALTH CARE EDUCATION/TRAINING PROGRAM

## 2024-06-19 PROCEDURE — 77001 FLUOROGUIDE FOR VEIN DEVICE: CPT | Mod: 26 | Performed by: RADIOLOGY

## 2024-06-19 PROCEDURE — 250N000011 HC RX IP 250 OP 636: Mod: JZ | Performed by: RADIOLOGY

## 2024-06-19 PROCEDURE — 99153 MOD SED SAME PHYS/QHP EA: CPT

## 2024-06-19 PROCEDURE — 272N000192 HC ACCESSORY CR2

## 2024-06-19 PROCEDURE — 99152 MOD SED SAME PHYS/QHP 5/>YRS: CPT

## 2024-06-19 PROCEDURE — C1751 CATH, INF, PER/CENT/MIDLINE: HCPCS

## 2024-06-19 PROCEDURE — 272N000504 HC NEEDLE CR4

## 2024-06-19 PROCEDURE — 99239 HOSP IP/OBS DSCHRG MGMT >30: CPT | Performed by: ORTHOPAEDIC SURGERY

## 2024-06-19 PROCEDURE — 250N000013 HC RX MED GY IP 250 OP 250 PS 637: Performed by: PHYSICIAN ASSISTANT

## 2024-06-19 PROCEDURE — 84132 ASSAY OF SERUM POTASSIUM: CPT | Performed by: STUDENT IN AN ORGANIZED HEALTH CARE EDUCATION/TRAINING PROGRAM

## 2024-06-19 PROCEDURE — 0JH63XZ INSERTION OF TUNNELED VASCULAR ACCESS DEVICE INTO CHEST SUBCUTANEOUS TISSUE AND FASCIA, PERCUTANEOUS APPROACH: ICD-10-PCS | Performed by: RADIOLOGY

## 2024-06-19 RX ORDER — ONDANSETRON 2 MG/ML
4 INJECTION INTRAMUSCULAR; INTRAVENOUS EVERY 8 HOURS PRN
Status: DISCONTINUED | OUTPATIENT
Start: 2024-06-19 | End: 2024-06-19 | Stop reason: HOSPADM

## 2024-06-19 RX ORDER — FENTANYL CITRATE 50 UG/ML
25-50 INJECTION, SOLUTION INTRAMUSCULAR; INTRAVENOUS EVERY 5 MIN PRN
Status: DISCONTINUED | OUTPATIENT
Start: 2024-06-19 | End: 2024-06-19 | Stop reason: HOSPADM

## 2024-06-19 RX ORDER — HEPARIN SODIUM,PORCINE 10 UNIT/ML
5-20 VIAL (ML) INTRAVENOUS
Status: CANCELLED | OUTPATIENT
Start: 2024-06-19

## 2024-06-19 RX ORDER — NALOXONE HYDROCHLORIDE 0.4 MG/ML
0.2 INJECTION, SOLUTION INTRAMUSCULAR; INTRAVENOUS; SUBCUTANEOUS
Status: DISCONTINUED | OUTPATIENT
Start: 2024-06-19 | End: 2024-06-19 | Stop reason: HOSPADM

## 2024-06-19 RX ORDER — NALOXONE HYDROCHLORIDE 0.4 MG/ML
0.4 INJECTION, SOLUTION INTRAMUSCULAR; INTRAVENOUS; SUBCUTANEOUS
Status: DISCONTINUED | OUTPATIENT
Start: 2024-06-19 | End: 2024-06-19 | Stop reason: HOSPADM

## 2024-06-19 RX ORDER — CEFAZOLIN SODIUM 2 G/100ML
2 INJECTION, SOLUTION INTRAVENOUS EVERY 8 HOURS
Qty: 3000 ML | Refills: 0 | Status: ACTIVE | OUTPATIENT
Start: 2024-06-19 | End: 2024-06-29

## 2024-06-19 RX ORDER — LORAZEPAM 2 MG/ML
.5-1 INJECTION INTRAMUSCULAR EVERY 6 HOURS PRN
Status: DISCONTINUED | OUTPATIENT
Start: 2024-06-19 | End: 2024-06-19 | Stop reason: HOSPADM

## 2024-06-19 RX ORDER — PROCHLORPERAZINE MALEATE 10 MG
10 TABLET ORAL EVERY 6 HOURS PRN
Status: DISCONTINUED | OUTPATIENT
Start: 2024-06-19 | End: 2024-06-19 | Stop reason: HOSPADM

## 2024-06-19 RX ORDER — HEPARIN SODIUM,PORCINE 10 UNIT/ML
5 VIAL (ML) INTRAVENOUS
Status: COMPLETED | OUTPATIENT
Start: 2024-06-19 | End: 2024-06-19

## 2024-06-19 RX ORDER — TRAZODONE HYDROCHLORIDE 50 MG/1
50 TABLET, FILM COATED ORAL
Qty: 10 TABLET | Refills: 0 | Status: SHIPPED | OUTPATIENT
Start: 2024-06-19 | End: 2024-06-24 | Stop reason: SINTOL

## 2024-06-19 RX ORDER — PROMETHAZINE HYDROCHLORIDE 25 MG/1
25 TABLET ORAL EVERY 6 HOURS PRN
Status: DISCONTINUED | OUTPATIENT
Start: 2024-06-19 | End: 2024-06-19 | Stop reason: HOSPADM

## 2024-06-19 RX ORDER — PROCHLORPERAZINE 25 MG
25 SUPPOSITORY, RECTAL RECTAL EVERY 12 HOURS PRN
Status: DISCONTINUED | OUTPATIENT
Start: 2024-06-19 | End: 2024-06-19 | Stop reason: HOSPADM

## 2024-06-19 RX ORDER — HEPARIN SODIUM,PORCINE 10 UNIT/ML
5-20 VIAL (ML) INTRAVENOUS EVERY 24 HOURS
Status: CANCELLED | OUTPATIENT
Start: 2024-06-19

## 2024-06-19 RX ORDER — FLUMAZENIL 0.1 MG/ML
0.2 INJECTION, SOLUTION INTRAVENOUS
Status: DISCONTINUED | OUTPATIENT
Start: 2024-06-19 | End: 2024-06-19 | Stop reason: HOSPADM

## 2024-06-19 RX ADMIN — OXYCODONE HYDROCHLORIDE 10 MG: 5 SOLUTION ORAL at 03:35

## 2024-06-19 RX ADMIN — FENTANYL CITRATE 50 MCG: 50 INJECTION, SOLUTION INTRAMUSCULAR; INTRAVENOUS at 14:47

## 2024-06-19 RX ADMIN — ACETAMINOPHEN 325 MG: 325 TABLET, FILM COATED ORAL at 16:36

## 2024-06-19 RX ADMIN — MIDAZOLAM 1 MG: 1 INJECTION INTRAMUSCULAR; INTRAVENOUS at 14:48

## 2024-06-19 RX ADMIN — TRAZODONE HYDROCHLORIDE 50 MG: 50 TABLET ORAL at 04:45

## 2024-06-19 RX ADMIN — MIDAZOLAM 1 MG: 1 INJECTION INTRAMUSCULAR; INTRAVENOUS at 14:36

## 2024-06-19 RX ADMIN — MIDAZOLAM 1 MG: 1 INJECTION INTRAMUSCULAR; INTRAVENOUS at 14:40

## 2024-06-19 RX ADMIN — OXYCODONE HYDROCHLORIDE 10 MG: 5 SOLUTION ORAL at 07:33

## 2024-06-19 RX ADMIN — DEXTROAMPHETAMINE SACCHARATE, AMPHETAMINE ASPARTATE, DEXTROAMPHETAMINE SULFATE AND AMPHETAMINE SULFATE 20 MG: 2.5; 2.5; 2.5; 2.5 TABLET ORAL at 07:34

## 2024-06-19 RX ADMIN — OXYCODONE HYDROCHLORIDE 10 MG: 5 SOLUTION ORAL at 16:36

## 2024-06-19 RX ADMIN — FENTANYL CITRATE 50 MCG: 50 INJECTION, SOLUTION INTRAMUSCULAR; INTRAVENOUS at 14:31

## 2024-06-19 RX ADMIN — MIDAZOLAM 1 MG: 1 INJECTION INTRAMUSCULAR; INTRAVENOUS at 14:31

## 2024-06-19 RX ADMIN — LIDOCAINE HYDROCHLORIDE 5 ML: 10 INJECTION, SOLUTION EPIDURAL; INFILTRATION; INTRACAUDAL; PERINEURAL at 14:52

## 2024-06-19 RX ADMIN — ALPRAZOLAM 0.5 MG: 0.25 TABLET ORAL at 08:29

## 2024-06-19 RX ADMIN — ACETAMINOPHEN 325 MG: 325 TABLET, FILM COATED ORAL at 11:34

## 2024-06-19 RX ADMIN — CEFAZOLIN SODIUM 2 G: 2 INJECTION, SOLUTION INTRAVENOUS at 13:06

## 2024-06-19 RX ADMIN — CARVEDILOL 12.5 MG: 12.5 TABLET, FILM COATED ORAL at 18:11

## 2024-06-19 RX ADMIN — SUCRALFATE 1 G: 1 SUSPENSION ORAL at 16:26

## 2024-06-19 RX ADMIN — FENTANYL CITRATE 50 MCG: 50 INJECTION, SOLUTION INTRAMUSCULAR; INTRAVENOUS at 14:36

## 2024-06-19 RX ADMIN — OXYCODONE HYDROCHLORIDE 10 MG: 5 SOLUTION ORAL at 11:34

## 2024-06-19 RX ADMIN — CEFAZOLIN SODIUM 2 G: 2 INJECTION, SOLUTION INTRAVENOUS at 06:15

## 2024-06-19 RX ADMIN — FENTANYL CITRATE 50 MCG: 50 INJECTION, SOLUTION INTRAMUSCULAR; INTRAVENOUS at 14:40

## 2024-06-19 RX ADMIN — PROCHLORPERAZINE MALEATE 10 MG: 10 TABLET ORAL at 03:35

## 2024-06-19 RX ADMIN — Medication 5 ML: at 14:52

## 2024-06-19 ASSESSMENT — ACTIVITIES OF DAILY LIVING (ADL)
ADLS_ACUITY_SCORE: 30

## 2024-06-19 NOTE — IR NOTE
Patient Name: Parker Acevedo  Medical Record Number: [id number]  Today's Date: 6/19/2024    Procedure: Tunneled CVC placement  Proceduralist: Dr. Henry, Dr. Baldwin  Pathology present: NA    Procedure Start: 1436  Procedure end: 1458  Sedation medications administered: 200 mcg fentanyl and 4 mg versed (sedation time 2710-0948)    Report given to: Shay HARRISON   : YOAV    Other Notes: Pt arrived to IR room 5 from . Consent reviewed. Pt denies any questions or concerns regarding procedure. Pt positioned supine and monitored per protocol. Pt tolerated procedure without any noted complications. Pt transferred back to . Line heparin locked and ready to use.

## 2024-06-19 NOTE — PHARMACY
[facility]  Parenteral ANtibiotic Review at Departure from Acute Care Collaborative Note     Patient: Parker Acevedo  MRN: [id number]  Allergies: Bactrim [sulfamethoxazole-trimethoprim], Penicillins, Ertapenem, Doxycycline, and Vancomycin    Current Location:  7[facility]  OPAT to be provided by: External [facility]     Line Type: Other (CVC)    Diagnosis/Indications: MSSE bacteremia likely due to CVC s/p removal on 6/15/2024  Organism(s): MSSE  MRDO? No  Pending Cultures/Microbiological Tests: yes 6/16-6/18 blood culture finalization    Inpatient ID involved in developing OPAT plan: Yes - discharge OPAT plan has no changes from ID provider, Dr. Luisa Dominique, OPAT plan charted on 6/19/2024    Outpatient ID Follow-up: ID OPAT Clinic Referral Placed ([facility] Ph: [phone] and Fax: [phone]) - no appointment needed  Designated Provider: Dr. Luisa Dominique    Antimicrobial Regimen / Route Anticipated  Duration Start Date Stop /  Reassess Date   Cefazolin 2 g every 8 hours/IV 2 weeks 6/16/2024 (first negative blood culture, s/p line removal) 6/29/2024     Laboratory Tests and Monitoring Frequency: CBC with Diff, CMP Once Weekly    Imaging/Miscellaneous Monitoring: None    ID Pharmacist Interventions: None                          Jyothi Burr, WaiD, BCIDP  Pager: [phone]

## 2024-06-19 NOTE — SUMMARY OF CARE
Pt left AMA at 1850.  Provider went over risks earlier in the day.  AMA form signed and in the chart.  Pt verbalizes understanding of risks.

## 2024-06-19 NOTE — PROGRESS NOTES
Care Management Follow Up    Length of Stay (days): 5    Expected Discharge Date: 06/21/2024     Concerns to be Addressed:  Discharge planning     Patient plan of care discussed at interdisciplinary rounds: Yes    Anticipated Discharge Disposition:  Home, Home Care, Home Infusion     Anticipated Discharge Services:  Home infusion  Anticipated Discharge DME: None    Patient/family educated on Medicare website which has current facility and service quality ratings:    Education Provided on the Discharge Plan: Yes  Patient/Family in Agreement with the Plan: yes    Referrals Placed by CM/SW: Internal Clinic Care Coordination, External Care Coordination, Homecare, Home Infusion  Private pay costs discussed: Not applicable    Additional Information:  Reviewed nursing notes from this AM; nursing concerned about pt accessing bedside cart & wanting saline flushes; noted holes/puncture(?) sites in forearm.   9:41am:  Received call from Sharon at [facility]. She reported they will provide pt's home TPN; they can provide IV antibiotics if needed. They need orders by 11am [place] Time (1pm Central Time) the day before discharge since they are in [place]. (RNCC will need orders by 12 noon Central Time to give time for orders to be faxed & received).   Will update Sharon with discharge date. Confirmed her phone number: [phone], ext [phone] and fax: [phone].   Spoke with pt's nurse, Terry; he has communicated with MD this AM.   Message sent to Dr Bingham informing him orders will be needed by 12noon the day before discharge. Inquiring if pt will discharge tomorrow.         Goldie Dale, RN Care Coordinator  Critical access hospital  On 6- RNCC coverage for Unit 7C rooms 5541-2529. Call [phone].         [facility]fusion [facility]  Phone: [phone] (ext [phone])  Phone:  [phone], ext [phone]  Fax: [phone]  TPN and hydration  Can also support Abx needs - IV cefazolin 2g q8h   Need orders by 1100 on  day of discharge (11am [place] Time. [facility] is based in [place])

## 2024-06-19 NOTE — PLAN OF CARE
Goal Outcome Evaluation:      Plan of Care Reviewed With: patient    Overall Patient Progress: no changeOverall Patient Progress: no change    Outcome Evaluation: Pt A/Ox4, VSS on RA. L PIV SL inbetween IV ancef Q8. NPO for IR CVC placement today. POC is for pt to discharge home post line placement.

## 2024-06-19 NOTE — PLAN OF CARE
"/84 (BP Location: Right arm)   Pulse 84   Temp 98.4  F (36.9  C) (Oral)   Resp 16   Ht 1.803 m (5' 11\")   Wt 87.2 kg (192 lb 4.8 oz)   SpO2 98%   BMI 26.82 kg/m      Care from: 1349-2339    VS & Pain: VSS, pain treated with PRN oxy  Neuro: A+Ox4  Respiratory: WDL, RA  Cardiac: WDL  Peripheral neurovascular: WDL  GI/: WDL  Nutrition: Regular diet   Skin: L shin scrape, new R fore arm bruising with puncture sites  Lines: L PIV  Activity: Independent  Events this shift: Given PRN oxy x2; given PRN zofran, given PRN xanex and trazodone for sleep per pt request; refused Am labs    Plan:  Continue POC, plan to place new CVC tomorrow around 1300        "

## 2024-06-19 NOTE — PRE-PROCEDURE
GENERAL PRE-PROCEDURE:   Procedure:  T CVC Placement  Date/Time:  6/19/2024 2:15 PM    Verbal consent obtained?: Yes    Written consent obtained?: Yes    Risks and benefits: Risks, benefits and alternatives were discussed    Consent given by:  Patient  Patient states understanding of procedure being performed: Yes    Patient's understanding of procedure matches consent: Yes    Procedure consent matches procedure scheduled: Yes    Expected level of sedation:  Moderate  Appropriately NPO:  Yes  Mallampati  :  Grade 3- soft palate visible, posterior pharyngeal wall not visible  Lungs:  Lungs clear with good breath sounds bilaterally  Heart:  Normal heart sounds and rate  History & Physical reviewed:  History and physical reviewed and no updates needed  Statement of review:  I have reviewed the lab findings, diagnostic data, medications, and the plan for sedation

## 2024-06-19 NOTE — CONSULTS
"      [facility] Consult Service Note  Interventional Radiology  06/19/24   9:38 AM    Consult Requested: placement of CVC for IV abx administration and chronic TPN     Recommendations/Plan:    Patient is approved for IR tunneled CVC SINGLE LUMEN powerline.    Timing of procedure is TBD based on IR staffing/schedule and triage.  Please contact the IR charge RN at [phone] for estimated time of procedure.     Labs WNL for procedure.    Orders entered for procedure, NPO status, and no pre procedure IV antibiotics needed as pt already on ancef.  Medications to be held include: no hold on lovenox  Informed consent will be completed prior to procedure.     Case and imaging discussed with IR attending Dr. John Henry MD     Recommendations were reviewed with Roland Bingham     History of Present Illness:  Parker Acevedo is a 53 year old English speaking male with past medical history of left TCVC (6FR and 27 cm double lumen CVC) on 1/25/2024 after removal of previously infected left TCVC.     That Left TCVC removed 6/17/2024 after being admitted with MSSE bacteremia and now 72 h negative blood culture, ID recommending replacement of CVC now.      Spoke with pharmacist and confirmed ancef and TPN not incompatible.  Spoke with Roland and TPN is 12 hour infusion only.  Okay for SINGLE lumen powerline placement.     Pertinent Imaging Reviewed:   No recent relevant imaging    Expected date of discharge:  06/21/2024    Vitals:   /67 (BP Location: Right arm)   Pulse 56   Temp 97.7  F (36.5  C) (Oral)   Resp 18   Ht 1.803 m (5' 11\")   Wt 87.2 kg (192 lb 4.8 oz)   SpO2 98%   BMI 26.82 kg/m      Pertinent Labs Reviewed:  CBC:  Lab Results   Component Value Date    WBC 6.5 06/18/2024    WBC 7.8 06/16/2024    WBC 8.2 06/14/2024    WBC 6.9 06/29/2021    WBC 8.1 06/14/2021    WBC 7.5 06/09/2021     Lab Results   Component Value Date    HGB 11.0 06/18/2024    HGB 11.3 06/16/2024    HGB 12.5 06/14/2024    " HGB 12.1 06/29/2021    HGB 12.0 06/14/2021    HGB 13.3 06/09/2021     Lab Results   Component Value Date     06/18/2024     06/17/2024     06/16/2024     06/29/2021     06/14/2021     06/09/2021    INR:  Lab Results   Component Value Date    INR 1.10 01/22/2024    INR 1.07 05/07/2021    PTT 25 09/29/2023    PTT 26 07/22/2019          COVID Results:  COVID-19 Antibody Results, Testing for Immunity           No data to display              COVID-19 PCR Results          7/7/2022    13:19 7/16/2022    22:19 8/9/2022    18:06 8/4/2023    04:27 11/11/2023    01:30   COVID-19 PCR Results   SARS CoV2 PCR Negative  Negative  Negative  Negative  Negative     Potassium   Date Value Ref Range Status   06/19/2024 4.3 3.4 - 5.3 mmol/L Final   12/13/2022 3.8 3.4 - 5.3 mmol/L Final   06/29/2021 3.5 3.4 - 5.3 mmol/L Final          Karla Jeter PA-C  Interventional Radiology  Pager: [phone]

## 2024-06-19 NOTE — PROGRESS NOTES
Care Management Discharge Note    Discharge Date: 06/21/2024     Discharge Disposition: Home, Home Infusion    Discharge Services:  Home infusion    Discharge DME:  None    Discharge Transportation: family or friend will provide    Private pay costs discussed: Not applicable    Does the patient's insurance plan have a 3 day qualifying hospital stay waiver?  Yes     Which insurance plan 3 day waiver is available? Alternative insurance waiver    Will the waiver be used for post-acute placement? No    PAS Confirmation Code: NA  Patient/family educated on Medicare website which has current facility and service quality ratings:      Education Provided on the Discharge Plan: Yes  Persons Notified of Discharge Plans:  Patient & wife, Rose  Patient/Family in Agreement with the Plan: yes    Handoff Referral Completed: Yes    Additional Information:  This AM reviewed progress note by Abdiel Hamilton RN at 7:48am: pt going out to smoke frequently; lethargic on return; 2 large holes in right forearm; pt accessing bedside cart. Spoke with Terry and he reported MD was informed this AM.    Discharge planning:  [facility] will need orders by 11am on day of discharge (per note yesterday by Hussain Nolen, [facility]).  Received call at 9:30am today from Sharon Cast. She reported they will need orders by 11am the day before discharge. She said they are in [place] and are on [place] Time, 2 hours earlier than [place]. Will keep her updated with discharge plan. Confirmed her phone & fax number.     Dr Bingham reported he spoke with pt this AM & pt is insistent on discharging. Pt will discharge on IV Cefazolin every 8 hours. Will have PICC line placed before discharge. Informed him Se[facility] needs the orders the day before discharge, but I will call & see what can be done.   1pm (11am [place] Time):  Faxed Cefazolin order, facesheet, Pharmacy Antimicrobial note from today & ID note from today to Serene.    Did not hear back so I left another message.   Spoke with [facility] Pharmacist and requested TPN order for discharge. He said pt has not been on TPN this hospitalization so they don't have any orders. Pt was admitted on 6-.   2:24pm:  Called Sharon at [facility] & left a voice message inquiring if she received fax, requested a call back.  [phone], ext: [phone].     Met with pt's wife, Rose; pt was off the floor getting PICC line placed. Informed Rose I faxed antibiotic order to [facility]. At this time it is not confirmed if they can deliver supplies. I asked if pt had TPN at home & she said he doesn't. Pt does have saline flushes at home. She said Dr Vyas is the doctor who orders pt's TPN on the outpatient side. Rose said  [facility]'s TPN, but they stopped. Rose said she had to find another agency. Rose complained when supplies were delivered from [facility] the ice would not be frozen. I will keep Rose updated on status of IV Cefazolin for home.  I called  [facility] nurse line and spoke with Quin. I also spoke with Shaylee Sibley, RN Liaison with [facility]. They reported the following information. Pt was discharged from [facility] for non-compliance. They last delivered pt's home TPN for the week of 5-31 to 6-. Therefore, pt has not had home TPN since then. Quin reported pt went thru many home care agencies & none were good enough for pt. Pt was not showing up for scheduled outpt lab appts; making it difficult for their Pharmacists to make the correct TPN mixture. Pt & his wife were recently staying in a hotel & them moved out of the home care agencies service area. Pt's wife was changing his PICC line dressings; supposed to be done by home nurse or at outpt appt.   Updated Dr Bingham on info from [facility] about pt's non-compliance.     3:16pm:  Did not hear back from [facility]. Called Customer Service at [phone], ext [phone] and left a voice message, requested a call  back about Cefazolin.    4:07pm:  Callie with [facility] called. She received my message; they did receive my fax. Callie said Sharon may have left early today due to the [place]eenth Holiday. Callie could not tell me when the Cefazolin would be delivered. She will leave a message for Sharon, she should be back tomorrow. Informed her pt is insistent on discharging today. Supplies should be delivered to pt's home. I suggested pt's wife, Rose is the best contact. They will contact Rose and confirm when pt will be home for delivery.     Informed Dr Bingham that [facility] could not tell me when Cefazolin will be delivered. They will call wife and set up delivery. Dr Bingham spoke with pt & wife again and pt still wants to leave today. Pt will be discharging without home TPN or IV Cefazolin supplies. (Has been off TPN this admission). Will discharge with a PICC line. Dr Bingham reported pt was home with PICC line before, no immediate concern for misuse at this time. Has saline flushes at home.     Spoke with pt and wife. Informed them I left multiple messages with [facility] this afternoon and just spoke with them. [facility] could not tell me when IV supplies will be delivered (I do not anticipate it will be today). [facility] will call Rose to set up delivery. Pt up ad vitor, dressed and belongings packed. Pt wanting to discharge. They expressed understanding. Instructed wife to call Dr Vyas's clinic or ID clinic if they do not hear back from [facility].  Plan:  Pt discharging home with wife. Has saline flushes for PICC line. [facility] to contact pt to deliver IV antibiotics. Wife to call the clinic if she does not hear back from [facility].     Addendum on 6-, 10:30am: Called Jami Hardy RN, Outpt Clinic [facility]. Phone: [phone]. Informed her of pt's discharge yesterday with PICC line; pt does not have home TPN or IV Cefazolin. Pt left AMA. She is very familiar with pt and will  inform Dr Vyas.       Goldie Dale, RN Care Coordinator  [facility]  [facility]  On 6- RNCC coverage for Unit 7C rooms 0844-2671. Call [phone].         [facility]  Phone: [phone] (ext [phone])  Phone:  [phone], ext [phone]  Fax: [phone]  TPN and hydration  Can also support Abx needs - IV cefazolin 2g q8h   Need orders by 1100 on day of discharge (11am [place] Time. [facility] is based in [place])

## 2024-06-19 NOTE — PROGRESS NOTES
Progress note:  Patient is very restless and agitated throughout the day. We were able to get the central line placed today. Later in the day, care coordination team notified me that home care agency may not be able to provide abx in timely fashion. He has been through multiple home care agency (inc [facility]) and discharged for various reasons. I strongly advised the patient to remain in the hospital until we can confirm the antibiotic coverage and the time of day that the antibiotic could be delivered. We also became aware that this home infusion company has not been providing the TPN yet and that prior TPN providers could not continue providing due to inability for patient to obtain the needed lab draws. I also noted the concerns from prior nursing and share the concerns that something else might be motivating his behaviors. However, he denies any concerns with substance use or other factors that we could address to help him remain in the hospital. I did sign the discharge orders earlier in the day without knowledge of the antibiotic issue. He remains decisional and can leave despite strong recommendation to remain. Ultimately, the home care agency does have the order and will likely be able to resume abx in the very near future. He had understanding of the risks.     Romeo Bingham MD on 6/19/2024 at 4:16 PM

## 2024-06-19 NOTE — PROGRESS NOTES
RN Jessica Acevedo reported to writer that pt had suspicious activities occurring over night. Pt frequently going outside to smoke which is not abnormal. Pts behavior was increasingly lethargic upon returning to room when leaving overnight. Jessica noticed two large holes in pts R forearm as well. Bedside cart was removed from room and pt was told it was broken.    Pt verbalized to writer about needing to get into the cart overnight to get saline flushes. Writer acknowledged pts need, but expressed concern over pt grabbing supplies without staff knowing. Pt informed he can always let staff know of his supply needs. Pt agreed to that, but writer noticed pt accessing the bedside cart out in the [place] and using the code to get in. Batteries removed from bedside cart.

## 2024-06-19 NOTE — PROGRESS NOTES
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 Has tolerated slower infusion with diphenhydramine.  Doxycycline - causes rash  Ertapenem - nausea, vomiting  TMP-SMX, causes rash    Discussion:  54 yo M with MSSE bacteremia, likely 2/2 CLABSI. CVC has removed, pending culture. Blood cultures remained positive. Since CVC remove, blood cultures that are obtained afterwards remained negative for 48 hours. Can stop daily blood cultures now.. Given Staph epidermidis is MSSE this admission (as similar to isolate on 6/6), recommend to stop iv vancomycin and start iv cefazolin. Noted TTE did not show endocarditis, although technically difficult study. If persistent bacteremia, despite CVC removal then will have to consider RONEL. No sings of secondary seeding.     On today's visit, clinically well. Awaiting Mc catheter placement with IR, ongoing efforts by primary team to schedule this afternoon, and discharge afterwards.     Recommendations:   Repeat blood cultures remained negative since 6/16  Can proceed with Cm placement per IR on 6/19 (assuming blood cultures 6/16 remained negative.   Continue iv  "cefazolin 2g q8h   Anticipate 2 weeks course from first negative blood cultures (last day 6/29/2024)  See OPAT note below  ID clinic follow up is not needed    =====================================================  Primary team:  Place order panel  OPAT Pharmacy ([facility]) Review and ID Care Transition   Place imaging order(s) requested above prior to discharge.  Contact ID teams about missed ID clinic appointments and/or ongoing therapy needs.    Prolonged Parenteral/Oral Antibiotic Recommendations and ID Follow up  This template provides final ID recommendations as of this date.     Infectious Diseases Indication: MSSE bacteremia    Antibiotic Information  Name of Antibiotic Dose of Antibiotic1 Anticipated duration Effective start date2 End date      IV cefazolin   2g q8h    2 weeks    6/16/2024 6/29/2024                 1.Dose of antibiotic will need to be renally adjusted if creatinine clearance changes  2.Effective start date is the date of adequate therapy with appropriate spectrum    Method of antibiotic delivery:To be determined (plan for Cm catheter placement with IR). Is the line being used for another indication besides antimicrobials? Yes At the end of therapy should the line used for antimicrobials be removed or de-accessed? No. Selecting \"yes\" will function as written order to remove PICC line or de-access the indwelling line at the end of therapy.    Weekly labs required: CBC with diff and CMP. Dr. Dominique will follow labs at discharge until ID follow up. Fax labs to ID clinic.    Are there pending microbial tests: Yes Cultures     NO ID clinic follow up is necessary for this encounter.     ID provider route note: OPAT RN Care Coordinator [facility] Information:  Phone: [phone]  Fax: [phone] (Attention ID clinic nurses)    =====================================================  Recommendations are discussed with primary team.     ID team " will continue to follow. Please reach out if any questions or concerns.     Total time spent during this encounter (chart review, documentation, MDM, coordination of care): 50 minutes    Luisa Dominique MD   Infectious Diseases Staff Physician  Pager: [phone]  ulike ines   06/19/2024         Interval History and Events:     Seen and examined - no particular complaints. A bit feeling restless and wishes to discharge whether line placed or not.          HPI as adopted from initial ID consult on 6/15/2024:     Parker Acevedo is a 52 yo M with PMHx of Celestino-en-y gastric bypass c/b short gut syndrome, now TPN-dependent via CVC, h/o DVT, hx of recurrent CLABSI, who presented to ER 6/14/2024 due to positive blood cultures 6/6 on outpatient fever work up.      Per patient, has been having fever, chills especially with use of CVC for TPN since past ~3 weeks. Also notes irriation around catheter insertion site since past one week. He was seen as outpatient primary care 6/6, whereby blood cultures were obtained which resulted positive, with growth of MSSE. Was told to go to ER then, and arrived on 6/14 and being admitted for bacteremia management. Afebrile, relatively stable vitals. Repeat blood cultures GPC in clusters, pending verigene. Started on levofloxacin based on previous blood cultures (Jan, 2024). ID consult is requested for antibiotic and bacteremia management.     SHx:  Lives with wife. Recently moved to own house ~3 weeks ago, prior to that was staying in hotel. Has 2 pet dogs (healthy corgi). Current smoking.          Review of Systems:   Targeted 10 point ROS was completed with pertinent positives and negatives are detailed above.         Antimicrobial history:     Current:  cefazolin 6/16 - present    Prior:  Levofloxacin 6/14  IV Vancomycin 6/15 - 6/16         Physical Examination:     Vital signs:  /67 (BP Location: Right arm)   Pulse 56   Temp 97.7  F (36.5  C) (Oral)   Resp 18   Ht 1.803 m (5'  "11\")   Wt 87.2 kg (192 lb 4.8 oz)   SpO2 98%   BMI 26.82 kg/m      GENERAL: alert and no distress  NECK: no adenopathy, no asymmetry, masses, or scars  RESP: lungs clear to auscultation - no rales, rhonchi or wheezes  CV: regular rate and rhythm, normal S1 S2, no S3 or S4, no murmur, click or rub, no peripheral edema  ABDOMEN: soft, nontender, no hepatosplenomegaly, no masses and bowel sounds normal  MS: no gross musculoskeletal defects noted, no edema  SKIN: left upper chest CVC removal site, w/o oozing or bleeding    Lines and devices:    PIV CDI, non-tender, no surrounding erythema.    Labs, Microbiology and Imaging studies reviewed.          Laboratory Data:       Microbiology:    Culture   Date Value Ref Range Status   06/18/2024 No growth after 1 day  Preliminary   06/18/2024 No growth after 1 day  Preliminary   06/17/2024 No growth after 1 day  Preliminary   06/17/2024 No growth after 1 day  Preliminary   06/16/2024 No growth after 3 days  Preliminary   06/16/2024 No growth after 3 days  Preliminary   06/15/2024 15-50 CFU Staphylococcus epidermidis (A)  Final   06/15/2024 <15 CFU Staphylococcus epidermidis (A)  Final   06/15/2024 Positive on the 1st day of incubation (A)  Preliminary   06/15/2024 Staphylococcus epidermidis (AA)  Preliminary     Comment:     2 of 2 bottles  Susceptibilities done on previous cultures   06/15/2024 Staphylococcus epidermidis (AA)  Preliminary     Comment:     1 of 2 bottles  Susceptibilities done on previous cultures   06/15/2024 Positive on the 2nd day of incubation (A)  Preliminary   06/15/2024 Staphylococcus epidermidis (AA)  Preliminary     Comment:     Susceptibilities done on previous cultures   06/15/2024 Staphylococcus epidermidis (AA)  Preliminary     Comment:     Susceptibilities done on previous cultures   06/15/2024 No growth after 3 days  Preliminary   06/14/2024 Positive on the 1st day of incubation (A)  Final   06/14/2024 Staphylococcus epidermidis (AA)  Final "     Comment:     2 of 2 bottles  Susceptibilities done on previous cultures   06/14/2024 Positive on the 1st day of incubation (A)  Final   06/14/2024 Staphylococcus epidermidis (AA)  Final     Comment:     2 of 2 bottles  Susceptibilities done on previous cultures   06/14/2024 Positive on the 1st day of incubation (A)  Final   06/14/2024 Staphylococcus epidermidis (AA)  Final     Comment:     2 of 2 bottles   06/14/2024 Staphylococcus epidermidis (AA)  Final     Comment:     1 of 2 bottles   06/03/2024 Positive on the 1st day of incubation (A)  Final   06/03/2024 Staphylococcus epidermidis (AA)  Final     Comment:     2 of 2 bottles   01/22/2024 No Growth  Final   01/22/2024 No Growth  Final   01/22/2024 <15 CFU Staphylococcus epidermidis (A)  Final     Comment:     Susceptibilities not routinely done, refer to antibiogram to view typical susceptibility profiles   01/20/2024 No Growth  Final   01/20/2024 No Growth  Final   01/19/2024 Positive on the 1st day of incubation (A)  Final   01/19/2024 Enterococcus faecalis (AA)  Final     Comment:     1 of 2 bottles  Susceptibilities done on previous cultures   01/19/2024 Positive on the 1st day of incubation (A)  Final   01/19/2024 Escherichia coli (AA)  Final     Comment:     2 of 2 bottles  Susceptibilities done on previous cultures   01/19/2024 Enterococcus faecalis (AA)  Final     Comment:     2 of 2 bottles  Susceptibilities done on previous cultures   01/19/2024 Positive on the 1st day of incubation (A)  Final   01/19/2024 Escherichia coli (AA)  Final     Comment:     2 of 2 bottles  Susceptibilities done on previous cultures   01/19/2024 Enterococcus faecalis (AA)  Final     Comment:     2 of 2 bottles  Susceptibilities done on previous cultures   01/18/2024 Positive on the 1st day of incubation (A)  Final   01/18/2024 Escherichia coli (AA)  Final     Comment:     2 of 2 bottles  Susceptibilities done on previous cultures   01/18/2024 Enterococcus faecalis (AA)   Final     Comment:     2 of 2 bottles  Susceptibilities done on previous cultures   01/18/2024 Positive on the 1st day of incubation (A)  Final   01/18/2024 Escherichia coli (AA)  Final     Comment:     2 of 2 bottles   01/18/2024 Enterococcus faecalis (AA)  Final     Comment:     2 of 2 bottles   01/18/2024 No Growth  Final   11/16/2023 No Growth  Final   11/16/2023 No Growth  Final   11/14/2023 <15 CFU Klebsiella pneumoniae (A)  Final     Comment:     Susceptibilities done on previous cultures   11/14/2023 No Growth  Final   11/14/2023 Positive on the 1st day of incubation (A)  Final   11/14/2023 Klebsiella pneumoniae (AA)  Final     Comment:     2 of 2 bottles  Susceptibilities done on previous cultures   11/14/2023 Positive on the 1st day of incubation (A)  Final   11/14/2023 Klebsiella pneumoniae (AA)  Final     Comment:     2 of 2 bottles  Susceptibilities done on previous cultures   11/11/2023 No Growth  Final   11/11/2023 Positive on the 1st day of incubation (A)  Final   11/11/2023 Klebsiella pneumoniae (AA)  Final     Comment:     2 of 2 bottles   08/07/2023 No Growth  Final   08/07/2023 No Growth  Final   08/06/2023 No Growth  Final   08/06/2023 No Growth  Final   08/04/2023 Positive on the 1st day of incubation (A)  Final   08/04/2023 Klebsiella pneumoniae (AA)  Final     Comment:     2 of 2 bottles   08/04/2023 No Growth  Final   07/07/2023 No Growth  Final   07/06/2023 No Growth  Final   07/06/2023 No Growth  Final   07/06/2023 No Growth  Final   07/05/2023 No Growth  Final   07/05/2023 No Growth  Final   07/04/2023 50-75 CFU Staphylococcus hominis (A)  Final   07/04/2023 Positive on the 1st day of incubation (A)  Final   07/04/2023 Staphylococcus epidermidis (AA)  Final     Comment:     2 of 2 bottlesSusceptibilities done on previous cultures   07/04/2023 No Growth  Final   07/03/2023 Positive on the 1st day of incubation (A)  Final   07/03/2023 Staphylococcus epidermidis (AA)  Final     Comment:      2 of 2 bottles   07/03/2023 Positive on the 1st day of incubation (A)  Final   07/03/2023 Staphylococcus epidermidis (AA)  Final     Comment:     1 of 2 bottles   07/03/2023 Staphylococcus epidermidis (AA)  Final     Comment:     1 of 2 bottles  Susceptibilities done on previous cultures   06/10/2023 No Growth  Final   06/10/2023 No Growth  Final   06/05/2023 No Growth  Final   06/04/2023 Positive on the 1st day of incubation (A)  Final   06/04/2023 Klebsiella pneumoniae (AA)  Final     Comment:     1 of 2 bottles  Susceptibilities done on previous culture   06/04/2023 Enterococcus faecalis (AA)  Final     Comment:     1 of 2 bottlesSusceptibilities done on previous cultures   06/04/2023 Klebsiella oxytoca (AA)  Final     Comment:     1 of 2 bottles  Susceptibilities done on previous cultures     06/04/2023 Positive on the 1st day of incubation (A)  Final   06/04/2023 Klebsiella pneumoniae (AA)  Final     Comment:     2 of 2 bottles   06/04/2023 Enterococcus faecalis (AA)  Final     Comment:     2 of 2 bottles   06/04/2023 Klebsiella oxytoca (AA)  Final     Comment:     2 of 2 bottles     05/12/2023 No Growth  Final   05/12/2023 No Growth  Final   01/21/2023 No Growth  Final   01/21/2023 No Growth  Final   07/16/2022 No Growth  Final   07/16/2022 No Growth  Final   07/16/2022 No Growth  Final   07/09/2022 No Growth  Final   07/09/2022 No Growth  Final   07/08/2022 No Growth  Final   07/08/2022 No Growth  Final   07/08/2022 No Growth  Final     Inflammatory Markers:   Recent Labs   Lab Test 07/04/23  1556 10/04/22  1440 07/16/22  2216 05/07/22  1423 03/15/22  1442 02/23/22  1621 01/08/22  1430 11/16/21  1300 08/24/21  0855 11/17/20  1445 07/28/20  1607 06/28/19  1345 05/14/19  1145 02/12/18  1400 01/23/18  0845 01/20/18  1030 10/02/17  1030 09/24/17  1900   SED 17  --  18*  --   --  10  --   --   --   --  10  --  13  --  10 11  --  31*   CRP  --  <2.9 4.4 34.0* <2.9 <2.9 52.0* <2.9 <2.9   < > <2.9   < >  --    < >  <2.9 5.4   < > 26.6*    < > = values in this interval not displayed.     Urine Studies:    Recent Labs   Lab Test 01/22/24  0048 11/11/23  0042 08/06/23  1056 06/05/23  0820 01/21/23  0916   LEUKEST Negative Negative Negative Negative Negative   WBCU 1 1 1 4 0     Hematology Studies:    Recent Labs   Lab Test 06/18/24  0604 06/17/24  1120 06/16/24  0613 06/14/24 2157 06/03/24  1645 05/31/24  1519 04/17/24  1328 03/05/24  1245 07/13/21  1110 06/29/21  1016 06/14/21  1017 06/09/21  1044 06/01/21  1117 05/25/21  1147 05/19/21  1342 05/18/21  1015   WBC 6.5  --  7.8 8.2 11.3* 6.4 6.1 5.2   < > 6.9 8.1 7.5 7.3   < > 6.1 6.2   ANEU  --   --   --   --   --   --   --   --   --  3.1 4.1 4.7 4.3  --  3.5 3.7   AEOS  --   --   --   --   --   --   --   --   --  0.2 0.2 0.3 0.1  --  0.2 0.2   HGB 11.0*  --  11.3* 12.5*  --  11.0* 12.8* 11.5*   < > 12.1* 12.0* 13.3 12.1*   < > 12.5* 12.6*   MCV 93  --  92 89  --  93 88 85   < > 96 97 96 94   < > 97 98    228 126* 217  --  153 205 179   < > 178 158 169 174   < > 159 145*    < > = values in this interval not displayed.      Metabolic Studies:   Recent Labs   Lab Test 06/18/24  0604 06/17/24  0703 06/16/24  0613 06/14/24 2157 05/31/24  1519 04/17/24  1328     --  140 141 141 144   POTASSIUM 3.7 4.3 3.8 3.1* 3.7 4.0   CHLORIDE 104  --  106 106 105 109*   CO2 27  --  24 23 28 26   BUN 12.1  --  9.3 5.2* 12.8 12.8   CR 0.78  --  0.83 0.77 0.75 0.71   GFRESTIMATED >90  --  >90 >90 >90 >90     Hepatic Studies:   Recent Labs   Lab Test 06/16/24  0613 06/14/24  2157 05/31/24  1519 04/17/24  1328 03/05/24  1245 02/05/24  0843   BILITOTAL 0.6 0.3 0.4  0.4 0.2 0.4 0.2   ALKPHOS 91 88 88 88 69 68   ALBUMIN 3.3* 3.5 3.7 3.7 4.0 3.9   AST 23 26 20 18 16 14   ALT 20 22 20 20 14 9     Hepatitis B Testing:   Recent Labs   Lab Test 08/29/22  1400 07/12/22  1718 07/08/22  0552   HBCAB  --  Nonreactive  --    HEPBANG Nonreactive  --  Reactive*   HBEABY  --  Negative  --    HBEAGN  --   Negative  --      Hepatitis C Testing:   Hepatitis C Antibody   Date Value Ref Range Status   2022 Nonreactive Nonreactive Final   2017 Nonreactive NR^Nonreactive Final     Comment:     Assay performance characteristics have not been established for newborns,   infants, and children             Last Pathology Report   Case Report   Date Value Ref Range Status   2023   Final    Surgical Pathology Report                         Case: [id number]                                  Authorizing Provider:  Delvis Mercado MD     Collected:           2023 11:01 AM          Ordering Location:     [facility]                 Received:            10/02/2023 08:24 AM          Pathologist:           Scott Torres MD                                                             Specimens:   A) - Other, Previous abdominal scar skin                                                            B) - Other, Segment of small bowel                                                          Clinical Information   Date Value Ref Range Status   2023   Final    Intussusception of jejunum       Final Diagnosis   Date Value Ref Range Status   2023   Final    A. Abdominal scar, excision:  - Skin with dermal scarring  - Negative for dysplasia or malignancy    B. Segment of small bowel, segmentectomy:  - Small bowel with congestion and edema, consistent with intussusception  - Margins feature viable tissue  - Negative for malignancy              Imagin/15/2024 TTE:  Technically difficult study.Extremely poor acoustic windows.  Interpretation Summary: No vegetation or mass identified, however this does not exclude endocarditis.

## 2024-06-19 NOTE — PROCEDURES
[facility]    Procedure: IR Procedure Note    Date/Time: 6/19/2024 3:09 PM    Performed by: Deo Baldwin MD  Authorized by: John Henry MD      UNIVERSAL PROTOCOL   Site Marked: NA  Prior Images Obtained and Reviewed:  Yes  Required items: Required blood products, implants, devices and special equipment available    Patient identity confirmed:  Verbally with patient, arm band, provided demographic data and hospital-assigned identification number  Patient was reevaluated immediately before administering moderate or deep sedation or anesthesia  Confirmation Checklist:  Patient's identity using two indicators, relevant allergies, procedure was appropriate and matched the consent or emergent situation and correct equipment/implants were available  Time out: Immediately prior to the procedure a time out was called    Universal Protocol: the Joint Commission Universal Protocol was followed    Preparation: Patient was prepped and draped in usual sterile fashion       ANESTHESIA    Anesthesia:  Local infiltration  Local Anesthetic:  Lidocaine 1% without epinephrine      SEDATION  Patient Sedated: Yes    Sedation:  Fentanyl and midazolam  Vital signs: Vital signs monitored during sedation    See dictated procedure note for full details.  Findings: LIJ Tunnel CVC placement, ready for use    Specimens: none    Complications: None    Condition: Stable    Plan: LIJ Tunnel CVC placement, ready for use      PROCEDURE  Describe Procedure: LIJ Tunnel CVC placement, ready for use  Patient Tolerance:  Patient tolerated the procedure well with no immediate complications  Length of time physician/provider present for 1:1 monitoring during sedation: 30

## 2024-06-19 NOTE — TELEPHONE ENCOUNTER
"OPAT to be provided by: [facility]     Line Type: Other (CVC)     Diagnosis/Indications: MSSE bacteremia likely due to CVC s/p removal on 6/15/2024  Organism(s): MSSE  MRDO? No  Pending Cultures/Microbiological Tests: yes 6/16-6/18 blood culture finalization     Inpatient ID involved in developing OPAT plan: Yes - discharge OPAT plan has no changes from ID provider, Dr. Luisa Dominique, OPAT plan charted on 6/19/2024     Outpatient ID Follow-up: ID OPAT Clinic Referral Placed ([facility] Ph: [phone] and Fax: [phone]) - no appointment needed  Designated Provider: Dr. Luisa Dominique     Antimicrobial Regimen / Route Anticipated  Duration Start Date Stop /  Reassess Date   Cefazolin 2 g every 8 hours/IV 2 weeks 6/16/2024 (first negative blood culture, s/p line removal) 6/29/2024    Prolonged Parenteral/Oral Antibiotic Recommendations and ID Follow up  This template provides final ID recommendations as of this date.      Infectious Diseases Indication: MSSE bacteremia     Antibiotic Information  Name of Antibiotic Dose of Antibiotic1 Anticipated duration Effective start date2 End date      IV cefazolin   2g q8h    2 weeks    6/16/2024 6/29/2024                           1.Dose of antibiotic will need to be renally adjusted if creatinine clearance changes  2.Effective start date is the date of adequate therapy with appropriate spectrum     Method of antibiotic delivery:To be determined (plan for Cm catheter placement with IR). Is the line being used for another indication besides antimicrobials? Yes At the end of therapy should the line used for antimicrobials be removed or de-accessed? No. Selecting \"yes\" will function as written order to remove PICC line or de-access the indwelling line at the end of therapy.     Weekly labs required: CBC with diff and CMP. Dr. Dominique will follow labs at discharge until ID follow up. Fax labs to ID clinic.     Are there pending " microbial tests: Yes Cultures      NO ID clinic follow up is necessary for this encounter.      ID provider route note: OPAT RN Care Coordinator [facility] ID Clinic Information:  Phone: [phone]  Fax: [phone] (Attention ID clinic nurses)

## 2024-06-20 ENCOUNTER — TELEPHONE (OUTPATIENT)
Dept: INFECTIOUS DISEASES | Facility: CLINIC | Age: 54
End: 2024-06-20
Payer: COMMERCIAL

## 2024-06-20 ENCOUNTER — TELEPHONE (OUTPATIENT)
Dept: SURGERY | Facility: CLINIC | Age: 54
End: 2024-06-20
Payer: COMMERCIAL

## 2024-06-20 LAB — BACTERIA BLD CULT: NO GROWTH

## 2024-06-20 NOTE — TELEPHONE ENCOUNTER
General Call      Reason for Call:  CALL BACK     What are your questions or concerns:  LK FREEMAN [phone] ex [phone] would like a call back regarding pts tpn and antibiotics

## 2024-06-20 NOTE — TELEPHONE ENCOUNTER
"Contacted patient regarding ID Clinic follow up via mobile phone number, did not answer. Sent patient a [facility] message to let them know that Dr. Dominique said that no ID clinic follow up was needed.     Ross Crystal MA on 6/20/2024 at 2:36 PM      [facility] Call Center    Phone Message    May a detailed message be left on voicemail: yes     Reason for Call: Appointment Intake    Referring Provider Name: 6/19/2024 [address] Med Surg Romeo Bingham MD  Diagnosis and/or Symptoms: You have been referred to infectious disease for follow-up of your blood stream infection.    Pt was seen /discussed by Dr Dominique 6/19- per notes: \"ID clinic follow up is not needed\"    Please further review, hospital discharge instructions conflict with Dr Dominique and no referral placed. Thank you     Action Taken: Message routed to:  [facility] infectious disease     Travel Screening: Not Applicable     Date of Service:                                                                     "

## 2024-06-21 ENCOUNTER — MYC MEDICAL ADVICE (OUTPATIENT)
Dept: PALLIATIVE MEDICINE | Facility: CLINIC | Age: 54
End: 2024-06-21
Payer: COMMERCIAL

## 2024-06-21 LAB
BACTERIA BLD CULT: NO GROWTH
BACTERIA BLD CULT: NO GROWTH

## 2024-06-22 LAB
BACTERIA BLD CULT: NO GROWTH
BACTERIA BLD CULT: NO GROWTH

## 2024-06-23 LAB
BACTERIA BLD CULT: NO GROWTH
BACTERIA BLD CULT: NO GROWTH

## 2024-06-23 NOTE — PROGRESS NOTES
[facility]    6/24/2024    Chief complaint:   -Ongoing abdominal pain  -also has a large abdominal hernia        Interval history:  Parker Acevedo is a 53 year old male is known to me for:  Visceral hyperalgesia  Chronic abdominal pain  Chronic pain syndrome  PMHx includes: ADHD, anxiety, cardiomyopathy and nutritional diseases, chronic abdominal pain, central line associated bloodstream infection recurrent, anesthesia complication, difficulty swallowing, gastric ulcer unspecified as acute or chronic without mention of hemorrhage perforation or obstruction, gastroesophageal reflux disease, head injury, hiatal hernia, other bladder disorder, other chronic pain, postop nausea and vomiting, severe malnutrition on TPN, short gut syndrome, tobacco abuse  PSHx includes: Appendectomy, back surgery (11/3/2014), biopsy of cervical lymph node (2/20/2015), cholecystectomy, colonoscopy (2021, 2022), cervical anterior 1 level discectomy and fusion (2/15/2017), endoscopic insertion tube gastrostomy (9/9/2013), endoscopic ultrasound upper gastrointestinal tract (4/29/2011), endoscopic ultrasound upper gastrointestinal tract (9/9/2013), endoscopic ultrasound upper gastrointestinal tract (2/24/2021), endoscopy (3/25/2011, 8/4/2009, 1/5/2009), multiple EGDs, gastrectomy (6/22/2012), gastrojejunostomy (8/26/2009), umbilical hernia repair (2006), hernia raphe hiatal (6/22/2012), herniorrhaphy inguinal (11/22/2013), insert PICC line on the right (12/19/2019), insert PICC line right (2/21/2020), insert vascular access port on the right (12/19/2017, 8/2/2018), multiple interventional radiology CVC tunnel placement and removals, exploratory laparotomy (11/22/2013), orthopedic surgery, Celestino-en-Y gastric bypass (2003), tonsillectomy.        Recommendations/plan at the last visit on 3/4/2024 included:  Physical Therapy: none  Clinical Health Psychologist to address issues of relaxation, behavioral change,  coping style, and other factors important to improvement: none  Continue gentle movement at home  Diagnostic Studies: none  Medication Management:   Continue fentanyl patch 25mcg/hr every 48 hours, fill 3/25 and start 3/28  Oxycodone liquid 5mg/5ml,  use 5-10mg (5-10mls) every 4 hours as needed, max of 40mg (40ml) per day. Fill 3/25 and start 3/28  Continue Lyrica 25mg at bedtime  Further procedures recommended: none  Recommendations/follow-up for PCP:  See above  Release of information: none  Follow up: 12 weeks for in-person or video visit. Please call [phone] to make your follow-up appointment with me.           Since his last visit, Parker Acevedo reports:    Interval history June 24, 2024  -he is getting settled in his new home.   -has a new abdominal hernia in the last month from carrying large boxes during the move.   -ongoing abdominal pain, he feels that this pain is worse due to the hernia.  -he is still on IV antibiotics given to him by home health  -he tried medical cannabis, he did not like how he felt on cannabis  -he had stopped taking Lyrica a while ago. He did find it helpful. Will re-start this for improvement of neuropathic portion of pain      Interval history March 4, 2024  -chronic neuropathic abdominal pain remains.   -he states his pain is worse right now.   -they are in the middle of moving and significantly downsizing. This has been quite stressful, so he believes the increase in pain is stress related.   -stable on current medication regimen     Interval history November 1, 2023  -had exploratory laparoscopy on 9/30 given jujunal intussusception   -ongoing abdominal pain, has had multiple complex abdominal surgeries over the years.   -his pain is stable on current dosing of fentanyl patch and liquid oxycodone.   -spirits are good    Interval history August 23, 2023  -he was in the hospital overnight from 8/6 to 8/7, had bacteremia  -they are having their home reroofed, re-sided and  remodeled due to black mold.   -they have to move out due to the black mold and health issues while remediating   -his abdominal pain had been up when he was ill. Then got to baseline.   -his stress levels are high now due to the black mold so his stress/anxiety is high.      Interval history May 23, 2023  -he has ongoing abdominal pain. He will now feel very ill about 3-4 times per month. When he feels like this, he often needs to go to the ER and get his medications via IV.   -he was not able to complete his colonoscopy as he was not as cleaned out enough.   -discussed possible Lyrica or Cymbalta. He prefers to keep his medications the same.    Interval history February 6, 2023  -struck his head on a cupboard reaching for a glass in the kitchen and cracked his head, had to get stiches above his eye in the forehead.   -he states he didn't have a work up for a concussion.   -he has had more headaches since striking his head.   -encouraged him to see his Primary Care Provider   -he has had nausea prior to the head injury.   -he has had nausea and vomiting that comes and goes. It is not worse now after the head injury.   -he has a lot of swelling around the laceration per patient report, I encouraged him to be seen in urgent care for evaluation.     Pain history collected at initial visit on 5/27/2022  He had gastric bypass in 2003 and about 5 years after that, he developed gastric ulcers. He ended up having surgery for gastric ulcer and hernias and such. He has had over 11 surgeries for his abdomen. He is malnourished due to short-gut syndrome. He has a J-tube that is open to vent bile from his stomach as he gets bloated.   Worst pain is inside the abdominal cavity. He will have pain so bad that he states he screams for his mother. He feels that this is a deep inside pain.   -he would like to increase his oxycodone dose by one dose per day. His activity is limited by not having medication to cover him for his daily  "activities.      -he has a DVT in his right arm and in his right jugular vein. He is on lovenox.         At this point, the patient's participation with our multidisciplinary team includes:  The patient has been compliant with the program.  PT - none  Health Psych - none      Pain scores:   Pain right now is 5.5/10.   Pain average score is 4/10.   Pain range is 2-10+/10. His pain will pop up but improves over about a half-hour after taking his medication.   Pain is described as \"sharp, burning, agonizing and like on fire, has some dull right and Left UQ.\"  Pain is constant in nature    Current pain relevant medications:   -tylenol 32mg/ml liquid take 15.65mls (500mg) Q 4 hours PRN fever/mild pain  (somewhat helpful for headaches)  --Duragesic 25mcg/hr Q 48 hours (helpful)  -lidoderm 5% patch PRN (helpful)  -Narcan nasal spray for opiate reversal   -Oxycodone 5mg/5ml solution take 5-10mls (5-10mg) TID PRN max of 40mg/day with 4 hours between doses.      Other pertinent medications:  -Adderall 20mg every day  -LOVENOX 120mg Subcutaneously every day  -lorazepam 1mg for anxiety with TPN and meds once per day (uses most nights)  -Zofran 8mg Q 8 hours PRN     Previous medication treatments included:  OPIATES: Fentanyl (helpful), morphine (hallucinations), Dilaudid (not helpful, did not dissolve), Tylenol #3 (not helpful), hydrocodone (not helpful), Belbuca (not helpful--he had a really heavy chest feeling)  NSAIDS: cannot take due to severe gastric ulcer disease  MUSCLE RELAXANTS: none  ANTI-MIGRAINE MEDS: none  ANTI-DEPRESSANTS: none  SLEEP AIDS: benadryl (helpful)  ANTI-CONVULSANTS: gabapentin (bad headaches and acid reflux),  TOPICALS: Lidocaine patches (helpful)  ANXIOLYTICS: valium (helpful 5mg dosage), lorazepam (helpful)  MEDICAL CANNABIS: not interested  Other meds: tylenol (helpful for headaches)        Other treatments have included:  Parker Acevedo has been seen at a pain clinic in the past.  Previously " managed here by Dr. Jessica Milligan. Also seen by [facility] for possible implanted intrathecal pain pump, he is not candidate due to infection risk.   PT: tried, never helped his neck or abdominal surgery  Massage Therapy: helpful for a day or two  Chiropractic care: tried, helped some   Acupuncture: tried, not helpful  TENs Unit: tried, not helpful  Healing Touch: not helpful     Injections:   -previously had injections for his neck with Dr. Jessica Milligan      Surgeries:  9/30/2023 exploratory laparotomy, reduction of intussusception, resection of small bowel with primary anastamosis, and upper endoscopy with Dr. Mercado (helpful)      THE 4 A's OF OPIOID MAINTENANCE ANALGESIA    Analgesia: keeps pain pretty manageable    Activity: he walks daily, light housekeeping    Adverse effects: none    Adherence to Rx protocol: yes      Side Effects: no side effect  Patient is using the medication as prescribed: YES    Medications:  Current Outpatient Medications   Medication Sig Dispense Refill    albuterol (VENTOLIN HFA) 108 (90 Base) MCG/ACT inhaler Inhale 2 puffs into the lungs every 6 hours as needed 18 g 1    ALPRAZolam (XANAX) 0.5 MG tablet TAKE ONE TABLET BY MOUTH TWICE A DAY AS NEEDED FOR SLEEP OR ANXIETY 60 tablet 0    amphetamine-dextroamphetamine (ADDERALL) 20 MG tablet TAKE ONE TABLET BY MOUTH ONCE DAILY 30 tablet 0    carvedilol (COREG) 6.25 MG tablet TAKE 2 TABLETS (12.5 MG) BY MOUTH 2 TIMES DAILY WITH MEALS 360 tablet 0    ceFAZolin (ANCEF) intermittent infusion 2 g in 100 mL dextrose PRE-MIX Inject 100 mLs (2 g) into the vein every 8 hours for 10 days 3000 mL 0    cyanocobalamin (CYANOCOBALAMIN) 1000 MCG/ML injection Inject 1 mL (1,000 mcg) into the muscle every 30 days 1 mL 3    enoxaparin ANTICOAGULANT (LOVENOX) 80 MG/0.8ML syringe Inject 0.8 mLs (80 mg) Subcutaneous 2 times daily INJECT THE CONTENTS OF ONE SYRINGE (80MG) UNDER THE SKIN TWO TIMES A DAY 67.2 mL 0    [facility] MANAGED PATIENT  "Contact [facility] for patient specific medication information at [phone] on admission and discharge from the hospital.  Phones are answered 24 hours a day 7 days a week 365 days a year.    Providers - Choose \"CONTINUE HOME MED (no script)\" at discharge if patient treatment with home infusion will continue.      fentaNYL (DURAGESIC) 25 mcg/hr 72 hr patch Place 1 patch onto the skin every 48 hours OK to fill 6/24/24 with start 6/26/24. 30 day supply for chronic pain. 15 patch 0    Lidocaine (LIDOCARE) 4 % Patch Place 1 patch onto the skin daily as needed for moderate pain To prevent lidocaine toxicity, patient should be patch free for 12 hrs daily.      lipids plant base (CLINOLIPID) 20 % infusion Inject 250 mLs into the vein every 24 hours 1000 mL 3    naloxone (NARCAN) 4 MG/0.1ML nasal spray Spray 1 spray (4 mg) into one nostril alternating nostrils as needed for opioid reversal every 2-3 minutes until assistance arrives 0.2 mL 3    nystatin (MYCOSTATIN) 127453 UNIT/GM external cream Apply topically daily as needed for other (around area of J tube)      ondansetron (ZOFRAN ODT) 8 MG ODT tab DISSOLVE ONE TABLET BY MOUTH EVERY 8 HOURS AS NEEDED FOR NAUSEA 90 tablet 1    oxyCODONE (ROXICODONE) 5 MG/5ML solution Take 5-10 mLs (5-10 mg) by mouth every 4 hours as needed for moderate to severe pain Max of 40mg/day. OK to fill 6/24/24 with start 6/26/24. 30 day supply for chronic pain. 1200 mL 0    polyethylene glycol (MIRALAX) 17 GM/Dose powder Take 17 g by mouth as needed for constipation      sucralfate (CARAFATE) 1 GM/10ML suspension Take 1 g by mouth 4 times daily as needed for nausea      Syringe/Needle, Disp, (B-D ECLIPSE SYRINGE) 27G X 1/2\" 1 ML MISC 1 Device every 30 days 30 each 1    traZODone (DESYREL) 50 MG tablet Take 1 tablet (50 mg) by mouth nightly as needed for sleep 10 tablet 0    vitamin D2 (ERGOCALCIFEROL) 82439 units (1250 mcg) capsule Take 1 capsule (50,000 Units) by mouth once a " week 12 capsule 3       Medical History: any changes in medical history since they were last seen? No    Social History:   Home situation:  to [place], one son in late 20's   Occupation/Schooling: he used to work at [employer]. He is on disability, SSDI  Tobacco use: smokes 1/2 ppd, discussed smoking cessation today, he is not ready at present time  Alcohol use: none  Drug use: none  History of chemical dependency treatment: none    Is patient a current smoker or tobacco user?  Down to 3-4 cigarettes per day  If yes, was cessation counseling offered?  Yes          Physical Exam:  Vital signs: There were no vitals taken for this visit.    Behavioral observations:  Awake, alert. Cooperative.   Pulm: respirations easy and unlabored. Able to speak in full sentences without SOB or cough noted.              Diagnostic tests:  CT ABDOMEN PELVIS W CONTRAST  2/16/2019 3:00 AM      HISTORY: Right-sided abdominal pain, status post gastric bypass.  Patient has G-tube with blood and history of ulcers.     TECHNIQUE: CT abdomen and pelvis with 85 mL Isovue-370 IV. Radiation  dose for this scan was reduced using automated exposure control,  adjustment of the mA and/or kV according to patient size, or iterative  reconstruction technique.     COMPARISON: 1/13/2015.     FINDINGS:  Abdomen: There is mild dependent atelectasis at the lung bases. The  heart size is normal. Small hiatal hernia. There is mild biliary  dilatation into the pancreas head which is probably normal post  cholecystectomy. The liver otherwise appears normal. The gallbladder  is absent. The spleen, pancreas, adrenal glands and kidneys are normal  in appearance. There is a G-tube in place. No abdominal or pelvic  lymph node enlargement.     Pelvis: The ascending colon and transverse colon are very distended  with gas, stool and fluid. The descending and rectosigmoid colon are  nondilated. Transition point is in the region of the splenic flexure,  but  there is no convincing obstruction. Small bowel is normal in  caliber. The appendix is normal. There is no free intraperitoneal gas  or fluid.                                                                      IMPRESSION:  1. The ascending and transverse colon are distended with gas, stool  and fluid. Distal colon is nondilated. No focal obstruction is  evident.  2. Small hiatal hernia.     IMANI HU MD        MR CERVICAL SPINE WITHOUT CONTRAST  1/2/2017  2:44 PM     HISTORY: Injury to neck 2 weeks ago with development of suspected  radicular pain to the left upper extremity with weakness of thumb and  index finger.     COMPARISON: Plain films 12/22/2016.     TECHNIQUE: Routine MR cervical spine extended through T2.     FINDINGS: Vertebral body bone marrow signal is normal and the  alignment is normal through T2. No malignant or destructive changes.  The cervical and upper thoracic spinal cord appear intrinsically  normal through T2. Craniocervical junction region is normal. No  paraspinous soft tissue abnormality.     Findings by level as follows:     C2-C3: Negative. No disc protrusion. No central or lateral stenosis.     C3-C4: Negative.     C4-C5: Very tiny central disc protrusion. No significant central or  lateral stenosis.     C5-C6: Moderate degenerative narrowing of the interspace. Mild  broad-based disc osteophyte complex and small uncinate spurs. Minimal  central stenosis and minimal bilateral foraminal stenosis.     C6-C7: Moderate degenerative narrowing of the interspace. No disc  protrusion. No central or lateral stenosis.     C7-T1: Negative.                                                                      IMPRESSION:  1. Degenerative changes as described, most marked at C5-C6 and C6-C7.  2. No intrinsic cord abnormality through T2.     MARTHA MCCARTY MD           MR LUMBAR SPINE W/O & W CONTRAST 1/6/2019 3:49 PM     Provided History: Back pain, > 6wks conservative tx, persistent  sx;  pain in the left paraspinal region- now with fungemia, persistent  blood stream infections since Oct 2018.     Comparison: No similar studies     Technique: Sagittal T1-weighted and T2-weighted and axial T2-weighted  images of the lumbar spine were obtained without intravenous contrast.  Post intravenous contrast using gadolinium axial and sagittal  T1-weighted images were obtained with fat saturation.     Contrast: 8.9CC GADAVIST     Findings: Regarding numbering convention, there are 5 lumbar-type  vertebrae assumed for the purposes of this dictation.  The tip of the  conus medullaris is at L1.  Regarding alignment, the lumbar vertebral  column appears normally aligned.  There is no significant disc height  narrowing at any level.  Regarding bone marrow signal intensity, no  abnormality is visualized on STIR images.     On a level by level basis:     L1-2: No significant spinal canal or foraminal narrowing.     L2-3: Minimal disc bulge. Mild thickening of the ligamentum flavum. No  significant spinal canal or foraminal narrowing.     L3-4: Mild disc bulge and thickening of the ligamentum flavum with  mild spinal canal narrowing. No neural foraminal narrowing.     L4-5: Mild disc bulge and thickening of the ligamentum flavum. No  central spinal canal narrowing or neuroforaminal stenosis.      L5-S1: No significant spinal canal or foraminal narrowing.     3 bandlike areas of high STIR signal and enhancement in the right  posterior paraspinous muscles.                                                                      Impression:  1. No abnormal signal within the spine to suggest fungal infection.  Mild nonspecific enhancement and STIR hyperintensity in the right  posterior paraspinous muscles, potentially myositis.  2. Multilevel lumbar spondylosis.     I have personally reviewed the examination and initial interpretation  and I agree with the findings.     YOLA TELLEZ MD           Other testing (labs,  diagnostics):  6/18/2024  Cr. 0.78  Est GFR >90        Screening tools:      DIRE Score for ongoing opioid management is calculated as follows:     Diagnosis = 2     Intractability = 2     Risk: Psych = 3  Chem Hlth = 2  Reliability = 2  Social = 2     Efficacy = 2     Total DIRE Score = 15 (14 or higher predicts good candidate for ongoing opioid management; 13 or lower predicts poor candidate for opioid management)         [place] Prescription Monitoring Program:  Reviewed [place]  6/24/2024- no concerning fills.  Natalie MENARD, RN CNP, FNP  [facility]  [place] location          Assessment:   Visceral hyperalgesia  Chronic abdominal pain  Neuropathic pain  Chronic bilateral low back pain without sciatica  Chronic pain syndrome  Chronic continuous use of opioids    Encounter for long term use of opiate analgesic (current use)  CSA 11/1/2023  UDT 11/1/2023  Long term current use of opiate analgesic  PMHx includes: ADHD, anxiety, cardiomyopathy and nutritional diseases, chronic abdominal pain, central line associated bloodstream infection recurrent, anesthesia complication, difficulty swallowing, gastric ulcer unspecified as acute or chronic without mention of hemorrhage perforation or obstruction, gastroesophageal reflux disease, head injury, hiatal hernia, other bladder disorder, other chronic pain, postop nausea and vomiting, severe malnutrition on TPN, short gut syndrome, tobacco abuse  PSHx includes: Appendectomy, back surgery (11/3/2014), biopsy of cervical lymph node (2/20/2015), cholecystectomy, colonoscopy (2021, 2022), cervical anterior 1 level discectomy and fusion (2/15/2017), endoscopic insertion tube gastrostomy (9/9/2013), endoscopic ultrasound upper gastrointestinal tract (4/29/2011), endoscopic ultrasound upper gastrointestinal tract (9/9/2013), endoscopic ultrasound upper gastrointestinal tract (2/24/2021), endoscopy (3/25/2011, 8/4/2009, 1/5/2009), multiple EGDs,  gastrectomy (6/22/2012), gastrojejunostomy (8/26/2009), umbilical hernia repair (2006), hernia raphe hiatal (6/22/2012), herniorrhaphy inguinal (11/22/2013), insert PICC line on the right (12/19/2019), insert PICC line right (2/21/2020), insert vascular access port on the right (12/19/2017, 8/2/2018), multiple interventional radiology CVC tunnel placement and removals, exploratory laparotomy (11/22/2013), orthopedic surgery, Celestino-en-Y gastric bypass (2003), tonsillectomy.        Plan:   Physical Therapy: none  Clinical Health Psychologist to address issues of relaxation, behavioral change, coping style, and other factors important to improvement: none  Continue gentle movement at home  Diagnostic Studies: none  Medication Management:   Continue fentanyl patch 25mcg/hr every 48 hours, fill 6/24 and start 6/28  Oxycodone liquid 5mg/5ml,  use 5-10mg (5-10mls) every 4 hours as needed, max of 40mg (40ml) per day. Fill 6/24 and start 6/28  Restart Lyrica 25mg at bedtime for 7 days, then increase to 25mg twice daily.  Further procedures recommended: none  Recommendations/follow-up for PCP:  See above  Release of information: none  Follow up: 4 weeks for video visit. Please call [phone] to make your follow-up appointment with me.       Video visit, patient is at home in [place].   Provider is on-site  Video start 0815  Video end 0830    ASSESSMENT AND PLAN:  (R19.8) Visceral hyperalgesia  (primary encounter diagnosis)  Comment:   Plan: pregabalin (LYRICA) 25 MG capsule, Adult Pain         Clinic Follow-Up Order            (R10.9,  G89.29) Chronic abdominal pain  Comment:   Plan: pregabalin (LYRICA) 25 MG capsule, Adult Pain         Clinic Follow-Up Order            (M79.2) Neuropathic pain  Comment:   Plan: pregabalin (LYRICA) 25 MG capsule, Adult Pain         Clinic Follow-Up Order            (M54.50,  G89.29) Chronic bilateral low back pain without sciatica  Comment:   Plan: pregabalin (LYRICA) 25 MG capsule, Adult Pain          Clinic Follow-Up Order            (G89.4) Chronic pain syndrome  Comment:   Plan: pregabalin (LYRICA) 25 MG capsule, Adult Pain         Clinic Follow-Up Order            (F11.90) Chronic, continuous use of opioids  Comment:   Plan: pregabalin (LYRICA) 25 MG capsule, Adult Pain         Clinic Follow-Up Order              BILLING TIME DOCUMENTATION:   TOTAL TIME includes:   Time spent preparing to see the patient: 7 minutes (reviewing records and tests)  Time spend face to face with the patient: 15 minutes  Time spent ordering tests, medications, procedures and referrals: 0 minutes  Time spent Referring and communicating with other healthcare professionals: 0 minutes  Documenting clinical information in Epic: 4 minutes    The total TIME spent on this patient on the day of the appointment was 26 minutes.                                        Natalie MENARD, RN CNP, FNP  [facility]  [facility]

## 2024-06-24 ENCOUNTER — VIRTUAL VISIT (OUTPATIENT)
Dept: PALLIATIVE MEDICINE | Facility: CLINIC | Age: 54
End: 2024-06-24
Attending: NURSE PRACTITIONER
Payer: COMMERCIAL

## 2024-06-24 ENCOUNTER — MEDICAL CORRESPONDENCE (OUTPATIENT)
Dept: HEALTH INFORMATION MANAGEMENT | Facility: CLINIC | Age: 54
End: 2024-06-24

## 2024-06-24 DIAGNOSIS — G89.4 CHRONIC PAIN SYNDROME: ICD-10-CM

## 2024-06-24 DIAGNOSIS — M54.50 CHRONIC BILATERAL LOW BACK PAIN WITHOUT SCIATICA: ICD-10-CM

## 2024-06-24 DIAGNOSIS — R10.9 CHRONIC ABDOMINAL PAIN: ICD-10-CM

## 2024-06-24 DIAGNOSIS — G89.29 CHRONIC ABDOMINAL PAIN: ICD-10-CM

## 2024-06-24 DIAGNOSIS — R19.8 VISCERAL HYPERALGESIA: Primary | ICD-10-CM

## 2024-06-24 DIAGNOSIS — G89.29 CHRONIC BILATERAL LOW BACK PAIN WITHOUT SCIATICA: ICD-10-CM

## 2024-06-24 DIAGNOSIS — F11.90 CHRONIC, CONTINUOUS USE OF OPIOIDS: ICD-10-CM

## 2024-06-24 DIAGNOSIS — M79.2 NEUROPATHIC PAIN: ICD-10-CM

## 2024-06-24 PROCEDURE — 99214 OFFICE O/P EST MOD 30 MIN: CPT | Mod: 95 | Performed by: NURSE PRACTITIONER

## 2024-06-24 PROCEDURE — G2211 COMPLEX E/M VISIT ADD ON: HCPCS | Mod: 95 | Performed by: NURSE PRACTITIONER

## 2024-06-24 RX ORDER — PREGABALIN 25 MG/1
CAPSULE ORAL
Qty: 60 CAPSULE | Refills: 0 | Status: ON HOLD | OUTPATIENT
Start: 2024-06-24 | End: 2024-09-04

## 2024-06-24 NOTE — PATIENT INSTRUCTIONS
----------------------------------------------------------------  [facility] Number:  [phone]   Call with any questions about your care and for scheduling assistance.   Calls are returned Monday through Friday between 8 AM and 4:30 PM. We usually get back to you within 2 business days depending on the issue/request.    If we are prescribing your medications:  For opioid medication refills, call the clinic or send a OurStay message 7 days in advance.  Please include:  Name of requested medication  Name of the pharmacy.  For non-opioid medications, call your pharmacy directly to request a refill. Please allow 3-4 days to be processed.   Per [place] State Law:  All controlled substance prescriptions must be filled within 30 days of being written.    For those controlled substances allowing refills, pickup must occur within 30 days of last fill.      We believe regular attendance is key to your success in our program!    Any time you are unable to keep your appointment we ask that you call us at least 24 hours in advance to cancel.This will allow us to offer the appointment time to another patient.   Multiple missed appointments may lead to dismissal from the clinic.

## 2024-06-25 ENCOUNTER — MEDICAL CORRESPONDENCE (OUTPATIENT)
Dept: HEALTH INFORMATION MANAGEMENT | Facility: CLINIC | Age: 54
End: 2024-06-25
Payer: COMMERCIAL

## 2024-06-26 NOTE — TELEPHONE ENCOUNTER
Patient was seen via video on 6/24/2024.     Natalie MENARD, RN CNP, FNP  [facility]

## 2024-06-28 ENCOUNTER — TELEPHONE (OUTPATIENT)
Dept: INTERNAL MEDICINE | Facility: CLINIC | Age: 54
End: 2024-06-28
Payer: COMMERCIAL

## 2024-06-28 ENCOUNTER — MYC REFILL (OUTPATIENT)
Dept: INTERNAL MEDICINE | Facility: CLINIC | Age: 54
End: 2024-06-28
Payer: COMMERCIAL

## 2024-06-28 DIAGNOSIS — Z98.84 S/P BARIATRIC SURGERY: Primary | ICD-10-CM

## 2024-06-28 DIAGNOSIS — E55.9 VITAMIN D DEFICIENCY: ICD-10-CM

## 2024-06-28 DIAGNOSIS — F41.9 CHRONIC ANXIETY: ICD-10-CM

## 2024-06-28 DIAGNOSIS — Z53.9 DIAGNOSIS NOT YET DEFINED: Primary | ICD-10-CM

## 2024-06-28 DIAGNOSIS — F90.0 ADHD (ATTENTION DEFICIT HYPERACTIVITY DISORDER), INATTENTIVE TYPE: ICD-10-CM

## 2024-06-28 PROCEDURE — G0180 MD CERTIFICATION HHA PATIENT: HCPCS | Performed by: INTERNAL MEDICINE

## 2024-06-28 NOTE — TELEPHONE ENCOUNTER
Reason for Call:  Form, our goal is to have forms completed with 72 hours, however, some forms may require a visit or additional information.    Type of letter, form or note:  Home Health Certification    Who is the form from?: Home care    Where did the form come from: form was faxed in    What clinic location was the form placed at?: [facility]    Where the form was placed: Given to physician    What number is listed as a contact on the form?: [phone]       Additional comments: [facility]    Call taken on 6/28/2024 at 11:01 AM by Kristin Jaimes

## 2024-06-29 ENCOUNTER — HEALTH MAINTENANCE LETTER (OUTPATIENT)
Age: 54
End: 2024-06-29

## 2024-07-01 ENCOUNTER — MYC MEDICAL ADVICE (OUTPATIENT)
Dept: INTERNAL MEDICINE | Facility: CLINIC | Age: 54
End: 2024-07-01
Payer: COMMERCIAL

## 2024-07-01 RX ORDER — SUCRALFATE ORAL 1 G/10ML
1 SUSPENSION ORAL 4 TIMES DAILY PRN
Qty: 414 ML | Refills: 1 | Status: SHIPPED | OUTPATIENT
Start: 2024-07-01 | End: 2024-09-01

## 2024-07-01 RX ORDER — ERGOCALCIFEROL 1.25 MG/1
50000 CAPSULE, LIQUID FILLED ORAL WEEKLY
Qty: 12 CAPSULE | Refills: 3 | OUTPATIENT
Start: 2024-07-01

## 2024-07-01 RX ORDER — ALPRAZOLAM 0.5 MG
TABLET ORAL
Qty: 60 TABLET | Refills: 0 | Status: SHIPPED | OUTPATIENT
Start: 2024-07-01 | End: 2024-08-01

## 2024-07-01 RX ORDER — DEXTROAMPHETAMINE SACCHARATE, AMPHETAMINE ASPARTATE, DEXTROAMPHETAMINE SULFATE AND AMPHETAMINE SULFATE 5; 5; 5; 5 MG/1; MG/1; MG/1; MG/1
TABLET ORAL
Qty: 30 TABLET | Refills: 0 | Status: SHIPPED | OUTPATIENT
Start: 2024-07-01 | End: 2024-08-01

## 2024-07-01 NOTE — TELEPHONE ENCOUNTER
Okay to see ENT if they have an opening otherwise see any provider for an ear examination as I am full tomorrow and out the rest of the week

## 2024-07-02 NOTE — TELEPHONE ENCOUNTER
Attempted to call phone numbers listed, neither has voicemail.  Sent Memeoirst message to patient with appointments for audiology and ENT    Kristin WOODSO/

## 2024-07-03 ENCOUNTER — MEDICAL CORRESPONDENCE (OUTPATIENT)
Dept: HEALTH INFORMATION MANAGEMENT | Facility: CLINIC | Age: 54
End: 2024-07-03
Payer: COMMERCIAL

## 2024-07-09 ENCOUNTER — MYC REFILL (OUTPATIENT)
Dept: PALLIATIVE MEDICINE | Facility: CLINIC | Age: 54
End: 2024-07-09
Payer: COMMERCIAL

## 2024-07-09 ENCOUNTER — MYC REFILL (OUTPATIENT)
Dept: INTERNAL MEDICINE | Facility: CLINIC | Age: 54
End: 2024-07-09
Payer: COMMERCIAL

## 2024-07-09 DIAGNOSIS — G89.29 CHRONIC ABDOMINAL PAIN: ICD-10-CM

## 2024-07-09 DIAGNOSIS — G89.4 CHRONIC PAIN SYNDROME: ICD-10-CM

## 2024-07-09 DIAGNOSIS — G89.29 CHRONIC BILATERAL LOW BACK PAIN WITHOUT SCIATICA: ICD-10-CM

## 2024-07-09 DIAGNOSIS — R10.9 CHRONIC ABDOMINAL PAIN: ICD-10-CM

## 2024-07-09 DIAGNOSIS — F11.90 CHRONIC, CONTINUOUS USE OF OPIOIDS: ICD-10-CM

## 2024-07-09 DIAGNOSIS — M54.50 CHRONIC BILATERAL LOW BACK PAIN WITHOUT SCIATICA: ICD-10-CM

## 2024-07-09 DIAGNOSIS — R93.1 ABNORMAL ECHOCARDIOGRAM: ICD-10-CM

## 2024-07-09 DIAGNOSIS — Z98.84 S/P BARIATRIC SURGERY: ICD-10-CM

## 2024-07-09 DIAGNOSIS — R19.8 VISCERAL HYPERALGESIA: ICD-10-CM

## 2024-07-09 RX ORDER — SUCRALFATE ORAL 1 G/10ML
1 SUSPENSION ORAL 4 TIMES DAILY PRN
Qty: 414 ML | Refills: 1 | OUTPATIENT
Start: 2024-07-09

## 2024-07-09 RX ORDER — FENTANYL 25 UG/1
1 PATCH TRANSDERMAL
Qty: 15 PATCH | Refills: 0 | Status: SHIPPED | OUTPATIENT
Start: 2024-07-09 | End: 2024-08-09

## 2024-07-09 RX ORDER — OXYCODONE HCL 5 MG/5 ML
5-10 SOLUTION, ORAL ORAL EVERY 4 HOURS PRN
Qty: 1200 ML | Refills: 0 | Status: SHIPPED | OUTPATIENT
Start: 2024-07-09 | End: 2024-08-09

## 2024-07-09 RX ORDER — CARVEDILOL 6.25 MG/1
TABLET ORAL
Qty: 360 TABLET | Refills: 0 | Status: SHIPPED | OUTPATIENT
Start: 2024-07-09

## 2024-07-09 NOTE — TELEPHONE ENCOUNTER
Signed Prescriptions:                        Disp   Refills    fentaNYL (DURAGESIC) 25 mcg/hr 72 hr patch 15 pat*0        Sig: Place 1 patch onto the skin every 48 hours OK to fill           07/24/24 with start 07/26/24. 30 day supply for           chronic pain.  Authorizing Provider: NATALIE MCDOWELL    oxyCODONE (ROXICODONE) 5 MG/5ML solution   1200 mL0        Sig: Take 5-10 mLs (5-10 mg) by mouth every 4 hours as           needed for moderate to severe pain Max of           40mg/day. OK to fill 07/24/24 with start           07/26/24. 30 day supply for chronic pain.  Authorizing Provider: NATALIE MCDOWELL        Reviewed [place]  July 9, 2024- no concerning fills.    Natalie MENARD, RN CNP, FNP  [facility]  [facility]

## 2024-07-09 NOTE — TELEPHONE ENCOUNTER
Received request for a refill(s) of fentaNYL (DURAGESIC) 25 mcg/hr 72 hr patch   And  oxyCODONE (ROXICODONE) 5 MG/5ML solution     Last dispensed from pharmacy on 06/24/24 for both    Patient's last office/virtual visit by prescribing provider on 06/24/24  Next office/virtual appointment scheduled for 09/10/24    Last urine drug screen date 11/01/23  Current opioid agreement on file (completed within the last year) Yes Date of opioid agreement: 1/01/23    E-prescribe to pharmacy-[facility] - [place], [place] - [address]     Will route to nursing pool for review and preparation of prescription(s).

## 2024-07-09 NOTE — TELEPHONE ENCOUNTER
Medication refill information reviewed.     Due date for  fentaNYL (DURAGESIC) 25 mcg/hr 72 hr patch and oxyCODONE (ROXICODONE) 5 MG/5ML solution is 07/26/24     Prescriptions prepped for review.     Will route to provider.

## 2024-07-10 DIAGNOSIS — Z53.9 DIAGNOSIS NOT YET DEFINED: Primary | ICD-10-CM

## 2024-07-10 PROCEDURE — G0179 MD RECERTIFICATION HHA PT: HCPCS | Performed by: INTERNAL MEDICINE

## 2024-07-16 ENCOUNTER — TELEPHONE (OUTPATIENT)
Dept: INTERNAL MEDICINE | Facility: CLINIC | Age: 54
End: 2024-07-16
Payer: COMMERCIAL

## 2024-07-16 NOTE — TELEPHONE ENCOUNTER
Reason for Call:  Form, our goal is to have forms completed with 72 hours, however, some forms may require a visit or additional information.    Type of letter, form or note:  Home Health Certification    Who is the form from?: Home care    Where did the form come from: form was faxed in    What clinic location was the form placed at?: [facility]    Where the form was placed: Given to physician    What number is listed as a contact on the form?: [phone]       Additional comments: [facility]    Call taken on 7/16/2024 at 4:22 PM by Kristin Jaimes

## 2024-07-17 ENCOUNTER — MEDICAL CORRESPONDENCE (OUTPATIENT)
Dept: HEALTH INFORMATION MANAGEMENT | Facility: CLINIC | Age: 54
End: 2024-07-17
Payer: COMMERCIAL

## 2024-07-23 ENCOUNTER — MYC REFILL (OUTPATIENT)
Dept: INTERNAL MEDICINE | Facility: CLINIC | Age: 54
End: 2024-07-23
Payer: COMMERCIAL

## 2024-07-23 DIAGNOSIS — R11.0 NAUSEA: ICD-10-CM

## 2024-07-23 DIAGNOSIS — F41.9 CHRONIC ANXIETY: ICD-10-CM

## 2024-07-23 DIAGNOSIS — F90.0 ADHD (ATTENTION DEFICIT HYPERACTIVITY DISORDER), INATTENTIVE TYPE: ICD-10-CM

## 2024-07-23 RX ORDER — ONDANSETRON 8 MG/1
TABLET, ORALLY DISINTEGRATING ORAL
Qty: 90 TABLET | Refills: 1 | Status: SHIPPED | OUTPATIENT
Start: 2024-07-23 | End: 2024-09-01

## 2024-07-24 NOTE — PROGRESS NOTES
This is a recent snapshot of the patient's [facility] medical record.  For current drug dose and complete information and questions, call [phone] or In Basket pool, fv home infusion ([id number])  CSN Number:  [id number]      
No

## 2024-07-24 NOTE — PROGRESS NOTES
Called [facility] to clarify when patient discontinued antibiotics.    Spoke with Emily Linares, . And she said I need to speak with pharmacist.     Caroline, Pharm D stated that antibiotics were sent abx 6/20-6/29/24. Currently getting TPN so line stayed in place.     Documentation of OPAT Completion    OPAT completed on: 6/26/24    Date Infusion company notified on: patient still using [facility]     PICC Pulled on: using for TPN managed by PCP    Episode closed? 07/24/24     Flowsheet updated? 07/24/24       Jim Mario RN  Infectious Disease on 7/24/2024 at 10:55 AM

## 2024-07-24 NOTE — PROGRESS NOTES
Called patient to inquire who is still managing his CVC line and antibiotics as he is still on them. He is not sure as his wife is in charge of it, they will call back when she gets home in about half hour.     Called [facility]  [phone] to inquire who they are getting orders from as Dr. Dominique sent hard stop orders for 6/29/24.     Dr. Francis Vyas with Surgery  and pharmacist Dr. Velazquez ext [phone] another contact Alicia Linares, [phone]. Ext [phone].     Patient is only getting TPN now.     Jim Mario RN  Infectious Disease on 7/24/2024 at 9:29 AM

## 2024-07-26 ENCOUNTER — MYC REFILL (OUTPATIENT)
Dept: INTERNAL MEDICINE | Facility: CLINIC | Age: 54
End: 2024-07-26
Payer: COMMERCIAL

## 2024-07-26 DIAGNOSIS — E53.8 VITAMIN B12 DEFICIENCY (NON ANEMIC): ICD-10-CM

## 2024-07-26 RX ORDER — NEEDLES, SAFETY 18GX1 1/2"
1 NEEDLE, DISPOSABLE MISCELLANEOUS
Qty: 30 EACH | Refills: 1 | Status: SHIPPED | OUTPATIENT
Start: 2024-07-26 | End: 2024-09-01

## 2024-07-26 RX ORDER — CYANOCOBALAMIN 1000 UG/ML
1 INJECTION, SOLUTION INTRAMUSCULAR; SUBCUTANEOUS
Qty: 1 ML | Refills: 3 | Status: SHIPPED | OUTPATIENT
Start: 2024-07-26

## 2024-07-31 ENCOUNTER — MEDICAL CORRESPONDENCE (OUTPATIENT)
Dept: HEALTH INFORMATION MANAGEMENT | Facility: CLINIC | Age: 54
End: 2024-07-31
Payer: COMMERCIAL

## 2024-07-31 NOTE — TELEPHONE ENCOUNTER
REFERRAL INFORMATION:  Referring Provider:   Referring Clinic:  Reason for Visit/Diagnosis: Billy-key tube change 16 fr 20 cm/ new hernia        FUTURE VISIT INFORMATION:  Appointment Date: 8/8/2024  Appointment Time: 11 AM     NOTES RECORD STATUS  DETAILS   OFFICE NOTE from Referring Provider N/A    OFFICE NOTE from Other Specialists Care Everywhere / Internal [facility]:  10/19/23, 10/14/21 - GEN SURG OV with Dr. Lilliam Bryant:  4/5/11 - GEN SURG OV with DENNIS Hartman   HOSPITAL DISCHARGE SUMMARY/ ED VISITS  Internal / Care Everywhere [facility]:  6/14/24 - Admission with Dr. Bingham  9/30/23 - Admission with Dr. Mercado    [facility]:  9/29/23 - ED OV with Dr. Delgado    [facility]:  9/1/18 - ED OV with Dr. Scott   OPERATIVE REPORT Care Everywhere / [facility]:  9/30/23 - OP Note for Laparotomy exploratory, reduction of intussusception, resection of small bowel with primary anastamosis, upper endoscopy with Dr. Mercado  7/7/23 - OP Note for Transesophageal echocardiogram in the OR   9/30/15 - OP Note for Screening, Epigastric pain with Dr. Vyas  4/30/14 - OP Note for g tube exchange with Dr. Vyas  11/22/13 - OP Note for Exploratory Laparotomy, Upper Endoscopy, Left Inguinal Hernia Repair with Dr. Vyas  * Additional in Epic    [facility]:  8/26/09 - OP Note for Extensive enterolysis, (one hour), partial resection of the jejunum, partial  resection of the gastric pouch, gastrojejunostomy anastomosis (all open  with Dr. Mir   ENDOSCOPY (EGD)  Internal [facility]:  9/30/15 - EGD   PERTINENT LABS Care Everywhere / Internal    IMAGING (CT, MRI, US, XR)  Internal [facility]:  11/15/23 - XR Abdomen  9/29/23 - CT Abd/Pelvis  9/27/22 - US Abdomen      No

## 2024-08-01 ENCOUNTER — MEDICAL CORRESPONDENCE (OUTPATIENT)
Dept: HEALTH INFORMATION MANAGEMENT | Facility: CLINIC | Age: 54
End: 2024-08-01
Payer: COMMERCIAL

## 2024-08-01 ENCOUNTER — MYC REFILL (OUTPATIENT)
Dept: INTERNAL MEDICINE | Facility: CLINIC | Age: 54
End: 2024-08-01
Payer: COMMERCIAL

## 2024-08-01 DIAGNOSIS — F41.9 CHRONIC ANXIETY: ICD-10-CM

## 2024-08-01 DIAGNOSIS — F90.0 ADHD (ATTENTION DEFICIT HYPERACTIVITY DISORDER), INATTENTIVE TYPE: ICD-10-CM

## 2024-08-01 RX ORDER — DEXTROAMPHETAMINE SACCHARATE, AMPHETAMINE ASPARTATE, DEXTROAMPHETAMINE SULFATE AND AMPHETAMINE SULFATE 5; 5; 5; 5 MG/1; MG/1; MG/1; MG/1
TABLET ORAL
Qty: 30 TABLET | Refills: 0 | OUTPATIENT
Start: 2024-08-01

## 2024-08-01 RX ORDER — ALPRAZOLAM 0.5 MG
TABLET ORAL
Qty: 60 TABLET | Refills: 0 | OUTPATIENT
Start: 2024-08-01

## 2024-08-01 RX ORDER — DEXTROAMPHETAMINE SACCHARATE, AMPHETAMINE ASPARTATE, DEXTROAMPHETAMINE SULFATE AND AMPHETAMINE SULFATE 5; 5; 5; 5 MG/1; MG/1; MG/1; MG/1
TABLET ORAL
Qty: 30 TABLET | Refills: 0 | Status: SHIPPED | OUTPATIENT
Start: 2024-08-01 | End: 2024-08-27

## 2024-08-01 RX ORDER — ALPRAZOLAM 0.5 MG
TABLET ORAL
Qty: 60 TABLET | Refills: 0 | Status: SHIPPED | OUTPATIENT
Start: 2024-08-01 | End: 2024-08-27

## 2024-08-06 RX ORDER — ALPRAZOLAM 0.5 MG
TABLET ORAL
Qty: 60 TABLET | Refills: 0 | OUTPATIENT
Start: 2024-08-06

## 2024-08-06 RX ORDER — DEXTROAMPHETAMINE SACCHARATE, AMPHETAMINE ASPARTATE, DEXTROAMPHETAMINE SULFATE AND AMPHETAMINE SULFATE 5; 5; 5; 5 MG/1; MG/1; MG/1; MG/1
TABLET ORAL
Qty: 30 TABLET | Refills: 0 | OUTPATIENT
Start: 2024-08-06

## 2024-08-07 ENCOUNTER — TELEPHONE (OUTPATIENT)
Dept: SURGERY | Facility: CLINIC | Age: 54
End: 2024-08-07
Payer: COMMERCIAL

## 2024-08-07 NOTE — TELEPHONE ENCOUNTER
General Call      Reason for Call:  [facility] is checking on a fax they sent over for and order        [phone]

## 2024-08-08 ENCOUNTER — PRE VISIT (OUTPATIENT)
Dept: ENDOCRINOLOGY | Facility: CLINIC | Age: 54
End: 2024-08-08

## 2024-08-08 ENCOUNTER — MEDICAL CORRESPONDENCE (OUTPATIENT)
Dept: HEALTH INFORMATION MANAGEMENT | Facility: CLINIC | Age: 54
End: 2024-08-08

## 2024-08-08 ENCOUNTER — OFFICE VISIT (OUTPATIENT)
Dept: ENDOCRINOLOGY | Facility: CLINIC | Age: 54
End: 2024-08-08
Payer: COMMERCIAL

## 2024-08-08 ENCOUNTER — TELEPHONE (OUTPATIENT)
Dept: SURGERY | Facility: CLINIC | Age: 54
End: 2024-08-08

## 2024-08-08 VITALS
WEIGHT: 200.4 LBS | HEART RATE: 53 BPM | HEIGHT: 70 IN | BODY MASS INDEX: 28.69 KG/M2 | DIASTOLIC BLOOD PRESSURE: 85 MMHG | SYSTOLIC BLOOD PRESSURE: 128 MMHG | OXYGEN SATURATION: 100 %

## 2024-08-08 DIAGNOSIS — E44.0 MODERATE PROTEIN-CALORIE MALNUTRITION (H): ICD-10-CM

## 2024-08-08 DIAGNOSIS — R13.10 DYSPHAGIA, UNSPECIFIED TYPE: Primary | ICD-10-CM

## 2024-08-08 DIAGNOSIS — Z78.9 ON TOTAL PARENTERAL NUTRITION (TPN): Primary | ICD-10-CM

## 2024-08-08 PROCEDURE — 99213 OFFICE O/P EST LOW 20 MIN: CPT | Performed by: SURGERY

## 2024-08-08 ASSESSMENT — PAIN SCALES - GENERAL: PAINLEVEL: MODERATE PAIN (4)

## 2024-08-08 NOTE — TELEPHONE ENCOUNTER
General Call        What are your questions or concerns:  [facility] would like a call back    [phone], nurse line

## 2024-08-08 NOTE — TELEPHONE ENCOUNTER
Called [facility] and spoke to intake and 2 other people and no one knew why I had called. They will call back if needed.

## 2024-08-08 NOTE — LETTER
8/8/2024       RE: Parker Acevedo  [address]  [address] [place] [id number]     Dear Colleague,    Thank you for referring your patient, Parker Acevedo, to the [facility] [place] at [facility]. Please see a copy of my visit note below.    Exchanged KASSANDRA button in clinic.    Switched out old for new; the skin around the KASSANDRA looks fine.    Using kassandra for pressure release.  Not using for tube feeds.    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        Again, thank you for allowing me to participate in the care of your patient.      Sincerely,    Francis Vyas MD

## 2024-08-08 NOTE — NURSING NOTE
"Chief Complaint   Patient presents with    Consult     Billy-key tube change, new hernia       Vitals:    08/08/24 1100   BP: 128/85   BP Location: Left arm   Patient Position: Sitting   Cuff Size: Adult Regular   Pulse: 53   SpO2: 100%   Weight: 90.9 kg (200 lb 6.4 oz)   Height: 1.778 m (5' 10\")       Body mass index is 28.75 kg/m .                          Tino Leo, EMT    "

## 2024-08-09 ENCOUNTER — TELEPHONE (OUTPATIENT)
Dept: ENDOCRINOLOGY | Facility: CLINIC | Age: 54
End: 2024-08-09
Payer: COMMERCIAL

## 2024-08-09 ENCOUNTER — MYC REFILL (OUTPATIENT)
Dept: INTERNAL MEDICINE | Facility: CLINIC | Age: 54
End: 2024-08-09
Payer: COMMERCIAL

## 2024-08-09 ENCOUNTER — MYC REFILL (OUTPATIENT)
Dept: PALLIATIVE MEDICINE | Facility: CLINIC | Age: 54
End: 2024-08-09
Payer: COMMERCIAL

## 2024-08-09 DIAGNOSIS — R19.8 VISCERAL HYPERALGESIA: ICD-10-CM

## 2024-08-09 DIAGNOSIS — G89.29 CHRONIC ABDOMINAL PAIN: ICD-10-CM

## 2024-08-09 DIAGNOSIS — M54.50 CHRONIC BILATERAL LOW BACK PAIN WITHOUT SCIATICA: ICD-10-CM

## 2024-08-09 DIAGNOSIS — G89.4 CHRONIC PAIN SYNDROME: ICD-10-CM

## 2024-08-09 DIAGNOSIS — F11.90 CHRONIC, CONTINUOUS USE OF OPIOIDS: ICD-10-CM

## 2024-08-09 DIAGNOSIS — G89.29 CHRONIC BILATERAL LOW BACK PAIN WITHOUT SCIATICA: ICD-10-CM

## 2024-08-09 DIAGNOSIS — R10.9 CHRONIC ABDOMINAL PAIN: ICD-10-CM

## 2024-08-09 DIAGNOSIS — R11.0 NAUSEA: ICD-10-CM

## 2024-08-09 RX ORDER — FENTANYL 25 UG/1
1 PATCH TRANSDERMAL
Qty: 15 PATCH | Refills: 0 | Status: SHIPPED | OUTPATIENT
Start: 2024-08-09 | End: 2024-09-13

## 2024-08-09 RX ORDER — ONDANSETRON 8 MG/1
TABLET, ORALLY DISINTEGRATING ORAL
Qty: 90 TABLET | Refills: 1 | OUTPATIENT
Start: 2024-08-09

## 2024-08-09 RX ORDER — OXYCODONE HCL 5 MG/5 ML
5-10 SOLUTION, ORAL ORAL EVERY 4 HOURS PRN
Qty: 1200 ML | Refills: 0 | Status: SHIPPED | OUTPATIENT
Start: 2024-08-09 | End: 2024-09-13

## 2024-08-09 NOTE — TELEPHONE ENCOUNTER
Routing to provider to review medication prepped per below    fentaNYL (DURAGESIC) 25 mcg/hr 72 hr patch , #15, Refill:0  Sig:Place 1 patch onto the skin every 48 hours. 30 day supply for chronic pain. - Transdermal  Last picked up 7/24/24 with start on 7/26/24  Due: OK to fill 8/23/24 and start 8/25/24      oxyCODONE (ROXICODONE) 5 MG/5ML solution , #1200ml, Refill:0  Sig:Take 5-10 mLs (5-10 mg) by mouth every 4 hours as needed for moderate to severe pain Max of 40mg/day. 30 day supply for chronic pain. - Oral   Last picked up 7/24/24 with start on 7/26/24  Due: OK to fill 8/23/24 and start 8/25/24      Per last OV note 6/24/24: Medication Management:   Continue fentanyl patch 25mcg/hr every 48 hours, fill 6/24 and start 6/28  Oxycodone liquid 5mg/5ml,  use 5-10mg (5-10mls) every 4 hours as needed, max of 40mg (40ml) per day. Fill 6/24 and start 6/28      [facility] [place], [place] - [address]  [address] [address]  Phone: [phone] Fax: [phone]    Perla NJ RN Care Coordinator  [facility]

## 2024-08-09 NOTE — TELEPHONE ENCOUNTER
Received call from patient requesting refill(s) of fentaNYL (DURAGESIC) 25 mcg/hr 72 hr patch  oxyCODONE (ROXICODONE) 5 MG/5ML solution    Last dispensed from pharmacy on 7/24/2024.    Patient's last office/virtual visit by prescribing provider on 6/24/2024.  Next office/virtual appointment scheduled for 9/10/2024.    Last urine drug screen date 11/1/2023.  Current opioid agreement on file (completed within the last year) Yes Date of opioid agreement: 11/1/2023.    E-prescribe to:    [facility] [place] - [address]    Will route to nursing pool for review and preparation of prescription(s).

## 2024-08-09 NOTE — TELEPHONE ENCOUNTER
Spoke with pharmacist and gave verbal order for TPN, weekly labs and dressing changes per Dr Vyas.

## 2024-08-09 NOTE — TELEPHONE ENCOUNTER
Order/Referral Request    Who is requesting: w/[facility]    Orders being requested: TPN w/nursing order    Reason service is needed/diagnosis: dressing changes & labs    When are orders needed by: asap    Has this been discussed with Provider: Yes    Does patient have a preference on a Group/Provider/Facility? [facility]    Does patient have an appointment scheduled?: No    Where to send orders: Fax [phone]    Could we send this information to you in [facility] or would you prefer to receive a phone call?:   Patient would prefer a phone call   Okay to leave a detailed message?: Yes at Other phone number:  [phone] [phone]

## 2024-08-09 NOTE — TELEPHONE ENCOUNTER
Refills have been requested for the following medications:         fentaNYL (DURAGESIC) 25 mcg/hr 72 hr patch [Natalie Hull]         oxyCODONE (ROXICODONE) 5 MG/5ML solution [Natalie Hull]     Preferred pharmacy: [facility] [place], [place] - [address] [place] [address]

## 2024-08-09 NOTE — TELEPHONE ENCOUNTER
Signed Prescriptions:                        Disp   Refills    fentaNYL (DURAGESIC) 25 mcg/hr 72 hr patch 15 pat*0        Sig: Place 1 patch onto the skin every 48 hours 30 day           supply for chronic pain. OK to fill 8/23/24 and           start 8/25/24  Authorizing Provider: NATALIE MCDOWELL    oxyCODONE (ROXICODONE) 5 MG/5ML solution   1200 mL0        Sig: Take 5-10 mLs (5-10 mg) by mouth every 4 hours as           needed for moderate to severe pain Max of           40mg/day. 30 day supply for chronic pain. OK to           fill 8/23/24 and start 8/25/24  Authorizing Provider: NATALIE MCDOWELL        Reviewed [place]  August 9, 2024- no concerning fills.    Natalie Mcdowell APRN, RN CNP, FNP  [facility]  [facility]

## 2024-08-12 ENCOUNTER — HOSPITAL ENCOUNTER (OUTPATIENT)
Facility: CLINIC | Age: 54
Discharge: HOME | End: 2024-08-12
Attending: RADIOLOGY | Admitting: PHYSICIAN ASSISTANT
Payer: COMMERCIAL

## 2024-08-12 ENCOUNTER — APPOINTMENT (OUTPATIENT)
Dept: INTERVENTIONAL RADIOLOGY/VASCULAR | Facility: CLINIC | Age: 54
End: 2024-08-12
Attending: SURGERY
Payer: COMMERCIAL

## 2024-08-12 ENCOUNTER — APPOINTMENT (OUTPATIENT)
Dept: MEDSURG UNIT | Facility: CLINIC | Age: 54
End: 2024-08-12
Attending: RADIOLOGY
Payer: COMMERCIAL

## 2024-08-12 VITALS
OXYGEN SATURATION: 100 % | TEMPERATURE: 98.1 F | WEIGHT: 200.2 LBS | BODY MASS INDEX: 28.73 KG/M2 | DIASTOLIC BLOOD PRESSURE: 62 MMHG | RESPIRATION RATE: 18 BRPM | SYSTOLIC BLOOD PRESSURE: 104 MMHG | HEART RATE: 52 BPM

## 2024-08-12 DIAGNOSIS — Z78.9 ON TOTAL PARENTERAL NUTRITION (TPN): ICD-10-CM

## 2024-08-12 DIAGNOSIS — E44.0 MODERATE PROTEIN-CALORIE MALNUTRITION (H): ICD-10-CM

## 2024-08-12 LAB — INR BLD: 1.2 (ref 2–3)

## 2024-08-12 PROCEDURE — 85610 PROTHROMBIN TIME: CPT

## 2024-08-12 PROCEDURE — 99152 MOD SED SAME PHYS/QHP 5/>YRS: CPT

## 2024-08-12 PROCEDURE — 250N000011 HC RX IP 250 OP 636: Performed by: NURSE PRACTITIONER

## 2024-08-12 PROCEDURE — 76937 US GUIDE VASCULAR ACCESS: CPT | Mod: 26 | Performed by: PHYSICIAN ASSISTANT

## 2024-08-12 PROCEDURE — 999N000127 HC STATISTIC PERIPHERAL IV START W US GUIDANCE

## 2024-08-12 PROCEDURE — 258N000003 HC RX IP 258 OP 636: Performed by: NURSE PRACTITIONER

## 2024-08-12 PROCEDURE — 999N000142 HC STATISTIC PROCEDURE PREP ONLY

## 2024-08-12 PROCEDURE — C1769 GUIDE WIRE: HCPCS

## 2024-08-12 PROCEDURE — 272N000192 HC ACCESSORY CR2

## 2024-08-12 PROCEDURE — 999N000132 HC STATISTIC PP CARE STAGE 1

## 2024-08-12 PROCEDURE — 36558 INSERT TUNNELED CV CATH: CPT | Performed by: PHYSICIAN ASSISTANT

## 2024-08-12 PROCEDURE — 250N000009 HC RX 250

## 2024-08-12 PROCEDURE — 77001 FLUOROGUIDE FOR VEIN DEVICE: CPT | Mod: 26 | Performed by: PHYSICIAN ASSISTANT

## 2024-08-12 PROCEDURE — C1751 CATH, INF, PER/CENT/MIDLINE: HCPCS

## 2024-08-12 PROCEDURE — 250N000011 HC RX IP 250 OP 636

## 2024-08-12 PROCEDURE — 255N000002 HC RX 255 OP 636: Performed by: RADIOLOGY

## 2024-08-12 PROCEDURE — 36589 REMOVAL TUNNELED CV CATH: CPT | Performed by: PHYSICIAN ASSISTANT

## 2024-08-12 PROCEDURE — 99152 MOD SED SAME PHYS/QHP 5/>YRS: CPT | Performed by: PHYSICIAN ASSISTANT

## 2024-08-12 PROCEDURE — 272N000504 HC NEEDLE CR4

## 2024-08-12 PROCEDURE — 250N000011 HC RX IP 250 OP 636: Performed by: PHYSICIAN ASSISTANT

## 2024-08-12 RX ORDER — ONDANSETRON 2 MG/ML
4 INJECTION INTRAMUSCULAR; INTRAVENOUS
Status: DISCONTINUED | OUTPATIENT
Start: 2024-08-12 | End: 2024-08-12 | Stop reason: HOSPADM

## 2024-08-12 RX ORDER — NALOXONE HYDROCHLORIDE 0.4 MG/ML
0.2 INJECTION, SOLUTION INTRAMUSCULAR; INTRAVENOUS; SUBCUTANEOUS
Status: DISCONTINUED | OUTPATIENT
Start: 2024-08-12 | End: 2024-08-12 | Stop reason: HOSPADM

## 2024-08-12 RX ORDER — FLUMAZENIL 0.1 MG/ML
0.2 INJECTION, SOLUTION INTRAVENOUS
Status: DISCONTINUED | OUTPATIENT
Start: 2024-08-12 | End: 2024-08-12 | Stop reason: HOSPADM

## 2024-08-12 RX ORDER — IODIXANOL 320 MG/ML
50 INJECTION, SOLUTION INTRAVASCULAR ONCE
Status: COMPLETED | OUTPATIENT
Start: 2024-08-12 | End: 2024-08-12

## 2024-08-12 RX ORDER — FENTANYL CITRATE 50 UG/ML
25-50 INJECTION, SOLUTION INTRAMUSCULAR; INTRAVENOUS EVERY 5 MIN PRN
Status: DISCONTINUED | OUTPATIENT
Start: 2024-08-12 | End: 2024-08-12 | Stop reason: HOSPADM

## 2024-08-12 RX ORDER — NALOXONE HYDROCHLORIDE 0.4 MG/ML
0.4 INJECTION, SOLUTION INTRAMUSCULAR; INTRAVENOUS; SUBCUTANEOUS
Status: DISCONTINUED | OUTPATIENT
Start: 2024-08-12 | End: 2024-08-12 | Stop reason: HOSPADM

## 2024-08-12 RX ORDER — CEFAZOLIN SODIUM 2 G/100ML
2 INJECTION, SOLUTION INTRAVENOUS
Status: COMPLETED | OUTPATIENT
Start: 2024-08-12 | End: 2024-08-12

## 2024-08-12 RX ORDER — LIDOCAINE 40 MG/G
CREAM TOPICAL
Status: DISCONTINUED | OUTPATIENT
Start: 2024-08-12 | End: 2024-08-12 | Stop reason: HOSPADM

## 2024-08-12 RX ORDER — SODIUM CHLORIDE 9 MG/ML
INJECTION, SOLUTION INTRAVENOUS CONTINUOUS
Status: DISCONTINUED | OUTPATIENT
Start: 2024-08-12 | End: 2024-08-12 | Stop reason: HOSPADM

## 2024-08-12 RX ORDER — HEPARIN SODIUM,PORCINE 10 UNIT/ML
5 VIAL (ML) INTRAVENOUS
Status: COMPLETED | OUTPATIENT
Start: 2024-08-12 | End: 2024-08-12

## 2024-08-12 RX ADMIN — IODIXANOL 10 ML: 320 INJECTION, SOLUTION INTRAVASCULAR at 11:34

## 2024-08-12 RX ADMIN — MIDAZOLAM 1 MG: 1 INJECTION INTRAMUSCULAR; INTRAVENOUS at 10:59

## 2024-08-12 RX ADMIN — LIDOCAINE HYDROCHLORIDE 10 ML: 10 INJECTION, SOLUTION EPIDURAL; INFILTRATION; INTRACAUDAL; PERINEURAL at 11:14

## 2024-08-12 RX ADMIN — MIDAZOLAM 1 MG: 1 INJECTION INTRAMUSCULAR; INTRAVENOUS at 11:05

## 2024-08-12 RX ADMIN — MIDAZOLAM 1 MG: 1 INJECTION INTRAMUSCULAR; INTRAVENOUS at 10:26

## 2024-08-12 RX ADMIN — SODIUM CHLORIDE: 9 INJECTION, SOLUTION INTRAVENOUS at 09:25

## 2024-08-12 RX ADMIN — FENTANYL CITRATE 50 MCG: 50 INJECTION, SOLUTION INTRAMUSCULAR; INTRAVENOUS at 11:14

## 2024-08-12 RX ADMIN — FENTANYL CITRATE 50 MCG: 50 INJECTION, SOLUTION INTRAMUSCULAR; INTRAVENOUS at 10:26

## 2024-08-12 RX ADMIN — MIDAZOLAM 1 MG: 1 INJECTION INTRAMUSCULAR; INTRAVENOUS at 10:48

## 2024-08-12 RX ADMIN — Medication 5 ML: at 11:22

## 2024-08-12 RX ADMIN — FENTANYL CITRATE 50 MCG: 50 INJECTION, SOLUTION INTRAMUSCULAR; INTRAVENOUS at 10:47

## 2024-08-12 RX ADMIN — CEFAZOLIN SODIUM 2 G: 2 INJECTION, SOLUTION INTRAVENOUS at 09:26

## 2024-08-12 RX ADMIN — FENTANYL CITRATE 50 MCG: 50 INJECTION, SOLUTION INTRAMUSCULAR; INTRAVENOUS at 11:04

## 2024-08-12 RX ADMIN — MIDAZOLAM 1 MG: 1 INJECTION INTRAMUSCULAR; INTRAVENOUS at 11:14

## 2024-08-12 RX ADMIN — FENTANYL CITRATE 50 MCG: 50 INJECTION, SOLUTION INTRAMUSCULAR; INTRAVENOUS at 10:58

## 2024-08-12 RX ADMIN — FENTANYL CITRATE 50 MCG: 50 INJECTION, SOLUTION INTRAMUSCULAR; INTRAVENOUS at 11:21

## 2024-08-12 RX ADMIN — MIDAZOLAM 1 MG: 1 INJECTION INTRAMUSCULAR; INTRAVENOUS at 11:21

## 2024-08-12 ASSESSMENT — ACTIVITIES OF DAILY LIVING (ADL)
ADLS_ACUITY_SCORE: 39

## 2024-08-12 NOTE — PROCEDURES
[facility]    Procedure: IR Procedure Note    Date/Time: 8/12/2024 11:41 AM    Performed by: Per Dumont PA-C  Authorized by: Per Dumont PA-C      UNIVERSAL PROTOCOL   Site Marked: NA  Prior Images Obtained and Reviewed:  Yes  Required items: Required blood products, implants, devices and special equipment available    Patient identity confirmed:  Verbally with patient, arm band, provided demographic data and hospital-assigned identification number  Patient was reevaluated immediately before administering moderate or deep sedation or anesthesia  Confirmation Checklist:  Patient's identity using two indicators, relevant allergies, procedure was appropriate and matched the consent or emergent situation and correct equipment/implants were available  Time out: Immediately prior to the procedure a time out was called    Universal Protocol: the Joint Commission Universal Protocol was followed    Preparation: Patient was prepped and draped in usual sterile fashion       ANESTHESIA    Anesthesia:  Local infiltration  Local Anesthetic:  Lidocaine 1% without epinephrine  Anesthetic Total (mL):  15      SEDATION  Patient Sedated: Yes    Sedation Type:  Moderate (conscious) sedation  Sedation:  Fentanyl and midazolam  Vital signs: Vital signs monitored during sedation    Fluoroscopy Time: 4 minute(s)  See dictated procedure note for full details.  Findings: Moderate sedation for TCVC placement - 6 mg of midazolam and 300 mcg of fentanyl given over 70 minutes. Patient tolerated the procedure well.     Specimens: none    Complications: None    Condition: Stable    Plan: 1 hour bedrest      PROCEDURE  Describe Procedure: Completed image-guided placement of 5 Slovenian, 33 cm single lumen tunneled central venous catheter via left internal jugular/innominate confluence. Aspirates and flushes freely, heparin locked and ready for immediate use. Tip in high right  atrium.      Patient Tolerance:  Patient tolerated the procedure well with no immediate complications  Length of time physician/provider present for 1:1 monitoring during sedation: 60

## 2024-08-12 NOTE — PROGRESS NOTES
Pt arrived on 2a post tunneled line replacement. VSS RA. Dressing c/d/I. No pain. Family at bedside. Pt declines food and drink

## 2024-08-12 NOTE — PROGRESS NOTES
Pt tolerated recovery without complication. Discharge instructions reviewed, copy given to pt. Pt declined food and drink. Pt tolerated ambulation.Voided. PIV dc'd. Pt discharged home accompanied by wife.

## 2024-08-12 NOTE — DISCHARGE INSTRUCTIONS
Interventional Radiology                 Discharge Instructions                       Following Tunneled Catheter Replacement    Your site(s) has been closed with:     The Catheter is sutured  to skin at  insertion site    Derma Peña (Skin Glue) on neck incision  Do not apply any ointments over site  This thin layer will slough off in 7-10 days               May gently remove Derma Peña in 10 days if still present         Tunneled catheter is always covered with a Sterile Transparent dressing  Keep site clean and dry   Cover with an occlusive dressing for showering  Weekly transparent dressing changes or when it becomes wet or dirty     If there is any oozing or bleeding from the site, apply direct pressure for 5-10 minutes with a gauze pad.  If bleeding continues after 10 minutes call your primary doctor.  If bleeding cannot be controlled with direct pressure, call 911.    Call your Doctor if:  Bleeding as above  Swelling in your neck or arm  Sudden onset of shortness of breath, lightheadedness, or heart palpitations..  Fever greater than 100.5  F  Other signs of infection such as, redness, tenderness, or drainage from the wound.    If you were given sedation:  We recommend an adult stay with you for the first 24 hours.  No driving or alcoholic beverages for 24 hours.    ADDITIONAL INSTRUCTIONS:                   No heavy lifting greater than 10 lbs for 3 days.           May use ice pack for pain or minor swelling for 15 min 3-4 times per day.           [facility] Interventional Radiology Department    Physician:  Per Dumont PA-C         Date:August 12, 2024  Telephone Numbers:  [phone]      Monday-Friday 7:30 am to 4:00 pm  Hospital : [phone]....After hours, Weekends, & Holidays.  Ask for the Interventional Radiologist on call.  Someone is on call 24 hours a day.   Emergency Department:  [phone]

## 2024-08-12 NOTE — IR NOTE
Patient Name: Parker Acevedo  Medical Record Number: [id number]  Today's Date: 8/12/2024    Procedure: Tunneled CVC replacement  Proceduralist: Per Dumont PA-C  Pathology present: NA    Procedure Start: 1057  Procedure end: 1133  Sedation medications administered: 300 mcg fentanyl and 6 mg versed (sedation time 2802-1872)    Report given to: Bhanu HARRISON 2A  : YOAV    Other Notes: Pt arrived to IR room 2 from s. Consent reviewed. Pt denies any questions or concerns regarding procedure. Pt positioned supine and monitored per protocol. Pt tolerated procedure without any noted complications. Pt transferred back to . Line heparin locked and ready to use.

## 2024-08-13 ENCOUNTER — TELEPHONE (OUTPATIENT)
Dept: INTERNAL MEDICINE | Facility: CLINIC | Age: 54
End: 2024-08-13

## 2024-08-13 ENCOUNTER — TELEPHONE (OUTPATIENT)
Dept: SURGERY | Facility: CLINIC | Age: 54
End: 2024-08-13

## 2024-08-13 ENCOUNTER — LAB REQUISITION (OUTPATIENT)
Dept: LAB | Facility: OTHER | Age: 54
End: 2024-08-13
Payer: COMMERCIAL

## 2024-08-13 DIAGNOSIS — K31.1 ADULT HYPERTROPHIC PYLORIC STENOSIS: ICD-10-CM

## 2024-08-13 DIAGNOSIS — E43 UNSPECIFIED SEVERE PROTEIN-CALORIE MALNUTRITION (H): ICD-10-CM

## 2024-08-13 DIAGNOSIS — K90.829 SHORT BOWEL SYNDROME, UNSPECIFIED: ICD-10-CM

## 2024-08-13 DIAGNOSIS — K90.9 INTESTINAL MALABSORPTION, UNSPECIFIED: ICD-10-CM

## 2024-08-13 LAB
ALBUMIN SERPL BCG-MCNC: 3.6 G/DL (ref 3.5–5.2)
ALP SERPL-CCNC: 113 U/L (ref 40–150)
ALT SERPL W P-5'-P-CCNC: 18 U/L (ref 0–70)
ANION GAP SERPL CALCULATED.3IONS-SCNC: 15 MMOL/L (ref 7–15)
AST SERPL W P-5'-P-CCNC: 22 U/L (ref 0–45)
BILIRUB SERPL-MCNC: 0.2 MG/DL
BUN SERPL-MCNC: 10.5 MG/DL (ref 6–20)
CALCIUM SERPL-MCNC: 8.3 MG/DL (ref 8.8–10.4)
CHLORIDE SERPL-SCNC: 105 MMOL/L (ref 98–107)
CREAT SERPL-MCNC: 0.66 MG/DL (ref 0.67–1.17)
CRP SERPL-MCNC: <3 MG/L
EGFRCR SERPLBLD CKD-EPI 2021: >90 ML/MIN/1.73M2
ERYTHROCYTE [DISTWIDTH] IN BLOOD BY AUTOMATED COUNT: 15.8 % (ref 10–15)
FASTING STATUS PATIENT QL REPORTED: ABNORMAL
FASTING STATUS PATIENT QL REPORTED: ABNORMAL
GLUCOSE SERPL-MCNC: 143 MG/DL (ref 70–99)
HCO3 SERPL-SCNC: 22 MMOL/L (ref 22–29)
HCT VFR BLD AUTO: 39.9 % (ref 40–53)
HGB BLD-MCNC: 12.9 G/DL (ref 13.3–17.7)
MAGNESIUM SERPL-MCNC: 2 MG/DL (ref 1.7–2.3)
MCH RBC QN AUTO: 29.9 PG (ref 26.5–33)
MCHC RBC AUTO-ENTMCNC: 32.3 G/DL (ref 31.5–36.5)
MCV RBC AUTO: 93 FL (ref 78–100)
PHOSPHATE SERPL-MCNC: 3 MG/DL (ref 2.5–4.5)
PLATELET # BLD AUTO: 211 10E3/UL (ref 150–450)
POTASSIUM SERPL-SCNC: 3.5 MMOL/L (ref 3.4–5.3)
PROT SERPL-MCNC: 6.6 G/DL (ref 6.4–8.3)
RBC # BLD AUTO: 4.31 10E6/UL (ref 4.4–5.9)
SODIUM SERPL-SCNC: 142 MMOL/L (ref 135–145)
TRIGL SERPL-MCNC: 190 MG/DL
WBC # BLD AUTO: 8 10E3/UL (ref 4–11)

## 2024-08-13 PROCEDURE — 83735 ASSAY OF MAGNESIUM: CPT | Mod: ORL | Performed by: SURGERY

## 2024-08-13 PROCEDURE — 84100 ASSAY OF PHOSPHORUS: CPT | Mod: ORL | Performed by: SURGERY

## 2024-08-13 PROCEDURE — 80053 COMPREHEN METABOLIC PANEL: CPT | Mod: ORL | Performed by: SURGERY

## 2024-08-13 PROCEDURE — 84478 ASSAY OF TRIGLYCERIDES: CPT | Mod: ORL | Performed by: SURGERY

## 2024-08-13 PROCEDURE — 86140 C-REACTIVE PROTEIN: CPT | Mod: ORL | Performed by: SURGERY

## 2024-08-13 PROCEDURE — 84134 ASSAY OF PREALBUMIN: CPT | Mod: ORL | Performed by: SURGERY

## 2024-08-13 PROCEDURE — 85027 COMPLETE CBC AUTOMATED: CPT | Mod: ORL | Performed by: SURGERY

## 2024-08-13 NOTE — TELEPHONE ENCOUNTER
[facility]    Phone Message    May a detailed message be left on voicemail: yes     Reason for Call: Order(s): Home Care Orders: Skilled Nursinx/week for 9 weeks for dressing changes and lab draws, with 6 PRN Visits.    Action Taken: Message routed to:  [facility] Gen Surg    Travel Screening: Not Applicable     Date of Service:

## 2024-08-13 NOTE — TELEPHONE ENCOUNTER
Please fax current med list to Estelle Novoa [facility] home [facility] as on boarding patient to     Fax [phone]    Kamilah Lanza RN  [facility] - Registered Nurse  Clinic Triage [place]   August 13, 2024

## 2024-08-14 LAB — PREALB SERPL-MCNC: 18.5 MG/DL (ref 20–40)

## 2024-08-14 NOTE — TELEPHONE ENCOUNTER
Verbal order given to Kristel at [facility] Skilled Nursinx/week for 9 weeks for dressing changes and lab draws, with 6 PRN Visits.     Med list and new Cm cath information faxed to [phone].

## 2024-08-14 NOTE — TELEPHONE ENCOUNTER
Printed and Faxed to Estelle HARRISON at [facility].    Closing encounter    Augusto Blackman

## 2024-08-16 ENCOUNTER — TELEPHONE (OUTPATIENT)
Dept: SURGERY | Facility: CLINIC | Age: 54
End: 2024-08-16
Payer: COMMERCIAL

## 2024-08-16 NOTE — TELEPHONE ENCOUNTER
Provided verbal order per Dr Vyas for patients TPN supplies to [facility] pharmacist.

## 2024-08-16 NOTE — TELEPHONE ENCOUNTER
General Call      Reason for Call:  tpn orders    What are your questions or concerns:  [facility] needs tpn orders    [phone]

## 2024-08-20 ENCOUNTER — TELEPHONE (OUTPATIENT)
Dept: SURGERY | Facility: CLINIC | Age: 54
End: 2024-08-20
Payer: COMMERCIAL

## 2024-08-20 NOTE — TELEPHONE ENCOUNTER
General Call      Reason for Call:  Labs    What are your questions or concerns:  Caller stated they need recent labs faxed to them for patient. Please fax [phone] to proceed with the order    Date of last appointment with provider: 8/8/24    Could we send this information to you in [employer] or would you prefer to receive a phone call?:   Patient would prefer a phone call   Okay to leave a detailed message?: Yes at Other phone number:  [phone]*

## 2024-08-22 ENCOUNTER — TELEPHONE (OUTPATIENT)
Dept: ENDOCRINOLOGY | Facility: CLINIC | Age: 54
End: 2024-08-22
Payer: COMMERCIAL

## 2024-08-22 NOTE — TELEPHONE ENCOUNTER
General Call      Reason for Call:  Lab checking to see if you've rec'd the fax to sign new lab orders.     Wants to change freq of labs to every 2 weeks.. needs new signed order.    Please carol ann to confirm    Karis/ [phone] x [phone]  secure VM

## 2024-08-23 NOTE — TELEPHONE ENCOUNTER
Due to COPD exacerbation  Patient had 2 L nasal cannula improved with normalization of her saturations, currently 100% on 2 L  Age-adjusted D-dimer noted to be normal  Home oxygen evaluation needed again     Patient reported 145 mL end of day on 9/26/19 and was admitted to inpatient on 9/29/19.     If he used 10 mL on the 27th, 28th and 29th he would have 115 mL or 11.5 days of medication.    Start date for next script will be 10/15/19.    YADIRA LazarN, RN  Care Coordinator  [facility] [place]

## 2024-08-27 ENCOUNTER — MYC REFILL (OUTPATIENT)
Dept: INTERNAL MEDICINE | Facility: CLINIC | Age: 54
End: 2024-08-27
Payer: COMMERCIAL

## 2024-08-27 ENCOUNTER — TELEPHONE (OUTPATIENT)
Dept: INTERNAL MEDICINE | Facility: CLINIC | Age: 54
End: 2024-08-27
Payer: COMMERCIAL

## 2024-08-27 DIAGNOSIS — F90.0 ADHD (ATTENTION DEFICIT HYPERACTIVITY DISORDER), INATTENTIVE TYPE: ICD-10-CM

## 2024-08-27 DIAGNOSIS — F41.9 CHRONIC ANXIETY: ICD-10-CM

## 2024-08-27 NOTE — TELEPHONE ENCOUNTER
Please triage and see what he needs.   Not a good surgical candidate so not sure what he wants to do.

## 2024-08-27 NOTE — TELEPHONE ENCOUNTER
Reason for Call:  Appointment Request    Patient requesting this type of appt:  Follow up on Hernia     Requested provider: Chuy Isaac    Reason patient unable to be scheduled: Not within requested timeframe    When does patient want to be seen/preferred time:  Sooner the better     Comments:  Wife called to get him in for a follow up on his hernia     Could we send this information to you in [facility] or would you prefer to receive a phone call?:   Patient would prefer a phone call   Okay to leave a detailed message?: Yes at Home number on file [phone] (home)    Call taken on 8/27/2024 at 10:18 AM by Fifi Lentz

## 2024-08-28 ENCOUNTER — TELEPHONE (OUTPATIENT)
Dept: INTERNAL MEDICINE | Facility: CLINIC | Age: 54
End: 2024-08-28
Payer: COMMERCIAL

## 2024-08-28 DIAGNOSIS — R78.81 GRAM-POSITIVE BACTEREMIA: Primary | ICD-10-CM

## 2024-08-28 DIAGNOSIS — R50.9 FEVER, UNSPECIFIED FEVER CAUSE: ICD-10-CM

## 2024-08-28 NOTE — TELEPHONE ENCOUNTER
Spoke with wife, C2C on file. Updated on providers advice.     Dr. Isaac, Please advise:     Patient spikes fever every time patient receives infusion. He gets TPN every night and hydration every evening. She says his G tube and Line were just replaced not that long ago.     She spoke with T[facility] for [facility] They told her all they would be able to do is take cultures, send him home and wait for results. Spouse would prefer to have cultures drawn here to avoid driving to [facility] just to be sent home.     Patient was scheduled for clinic visit on 09/03 regarding umbilical hernia.     Sid Montgomery RN on 8/28/2024 at 2:04 PM

## 2024-08-28 NOTE — TELEPHONE ENCOUNTER
Per home care Rn  Patient spiked a fever of 101.9. yesterday.  He does not have a fever right now.    They were advised to go to the ER.  The patient wanted home care to come draw blood cultures.    He is on TPN- he only has a fever when he is on his TPN per patient report to home care nurse.    No other symptoms per home care RN.    Please advise if blood work should be done.    Genesis Ardon RN on 8/28/2024 at 11:06 AM

## 2024-08-28 NOTE — TELEPHONE ENCOUNTER
Should have ER evaluate and take blood cultures in hospital, recommend [facility] for his complexity.     TEMAZEPAM 15MG CAPSULES    Please see pended medications. Please sign if appropriate. Thank you      Last OV: 10/06/2021      Last refill: 10/22/2021 for 30/1 refills. RTC in January 2022.     A user[employer]ents message was sent to the pt to schedule hi

## 2024-08-28 NOTE — TELEPHONE ENCOUNTER
Detailed approval left on confidential home care voicemail    Genesis Ardon RN on 8/28/2024 at 5:29 PM

## 2024-08-28 NOTE — TELEPHONE ENCOUNTER
Phone spouse. Placed on schedule.     Sid Montgomery RN on 8/28/2024 at 2:05 PM      Future Appointments 8/28/2024 - 2/24/2025        Date Visit Type Length Department Provider     9/3/2024 10:40 AM OFFICE VISIT 20 min PH INTERNAL MED Chuy Isaac MD    Location Instructions:     [facility] is located at [address], within [facility]. This is near the [address] exit off of [address]. Turn north off the exit, then west onto [address]. Park in the Blue Lot. The [facility] center share a main entrance and .               9/10/2024  2:30 PM RETURN PAIN 30 min [facility] PAIN MANAGEMENT Natalie Hull APRN CNP              10/16/2024  1:00 PM OFFICE VISIT 20 min PH INTERNAL MED Chuy Isaac MD    Location Instructions:     [facility] is located at [address], within [facility]. This is near the [address] exit off of [address]. Turn north off the exit, then west onto [address]. Park in the Blue Lot. The [facility] and [facility] [place] share a main entrance and .

## 2024-08-28 NOTE — TELEPHONE ENCOUNTER
Spoke to wife, C2C on file. Wife states that he feels his umbilical hernia is bigger and more bothersome. No other symptoms. She is aware that Dr. Vyas had recommended not being a good candidate for surgery. They are looking for a second opinion as he is wanting to take care of this. Routing to PCP to advise.

## 2024-08-28 NOTE — TELEPHONE ENCOUNTER
He can schedule next available with me, ok to use virtual time slot.    I am not sure there is a surgeon that would consider this.

## 2024-08-30 RX ORDER — DEXTROAMPHETAMINE SACCHARATE, AMPHETAMINE ASPARTATE, DEXTROAMPHETAMINE SULFATE AND AMPHETAMINE SULFATE 5; 5; 5; 5 MG/1; MG/1; MG/1; MG/1
TABLET ORAL
Qty: 30 TABLET | Refills: 0 | Status: SHIPPED | OUTPATIENT
Start: 2024-08-30 | End: 2024-09-26

## 2024-08-30 RX ORDER — ALPRAZOLAM 0.5 MG
TABLET ORAL
Qty: 60 TABLET | Refills: 0 | Status: SHIPPED | OUTPATIENT
Start: 2024-08-30 | End: 2024-09-26

## 2024-09-01 ENCOUNTER — MYC REFILL (OUTPATIENT)
Dept: INTERNAL MEDICINE | Facility: CLINIC | Age: 54
End: 2024-09-01
Payer: COMMERCIAL

## 2024-09-01 ENCOUNTER — NURSE TRIAGE (OUTPATIENT)
Dept: NURSING | Facility: CLINIC | Age: 54
End: 2024-09-01
Payer: COMMERCIAL

## 2024-09-01 DIAGNOSIS — R11.0 NAUSEA: ICD-10-CM

## 2024-09-01 DIAGNOSIS — Z98.84 S/P BARIATRIC SURGERY: ICD-10-CM

## 2024-09-01 DIAGNOSIS — E53.8 VITAMIN B12 DEFICIENCY (NON ANEMIC): ICD-10-CM

## 2024-09-01 NOTE — TELEPHONE ENCOUNTER
RN was transferred back to FNA Triage. RN stated the Provider who was paged for Chuy Isaac MD, stated that the Provider was not available. This writer transferred with the caller to the paging .   Home care RN will follow up with Provider via Paging .  Encounter closed.

## 2024-09-01 NOTE — TELEPHONE ENCOUNTER
Home care nurse calling with critical lab for patient. Dr Chuy Isaac is ordering Provider. Nurse advised to call back to paging  at [phone] to page provider, if needed. Home care nurse transferred to the paging .    Genna Pascal RN on 9/1/2024 at 11:12 AM  [facility]  Reason for Disposition   [1] Follow-up call from patient regarding patient's clinical status AND [2] information urgent    Protocols used: PCP Call - No Triage-A-

## 2024-09-02 RX ORDER — ONDANSETRON 8 MG/1
TABLET, ORALLY DISINTEGRATING ORAL
Qty: 90 TABLET | Refills: 1 | Status: SHIPPED | OUTPATIENT
Start: 2024-09-02

## 2024-09-02 RX ORDER — CYANOCOBALAMIN 1000 UG/ML
1 INJECTION, SOLUTION INTRAMUSCULAR; SUBCUTANEOUS
Qty: 1 ML | Refills: 3 | OUTPATIENT
Start: 2024-09-02

## 2024-09-03 ENCOUNTER — TELEPHONE (OUTPATIENT)
Dept: INTERNAL MEDICINE | Facility: CLINIC | Age: 54
End: 2024-09-03

## 2024-09-03 ENCOUNTER — HOSPITAL ENCOUNTER (INPATIENT)
Facility: CLINIC | Age: 54
LOS: 7 days | Discharge: HOME HEALTH | DRG: 315 | End: 2024-09-11
Attending: EMERGENCY MEDICINE | Admitting: STUDENT IN AN ORGANIZED HEALTH CARE EDUCATION/TRAINING PROGRAM
Payer: COMMERCIAL

## 2024-09-03 DIAGNOSIS — R78.81 BACTEREMIA: Primary | ICD-10-CM

## 2024-09-03 DIAGNOSIS — R53.83 OTHER FATIGUE: ICD-10-CM

## 2024-09-03 DIAGNOSIS — R11.0 NAUSEA: ICD-10-CM

## 2024-09-03 DIAGNOSIS — R50.9 FEVER, UNSPECIFIED FEVER CAUSE: ICD-10-CM

## 2024-09-03 PROCEDURE — 99285 EMERGENCY DEPT VISIT HI MDM: CPT | Performed by: EMERGENCY MEDICINE

## 2024-09-03 PROCEDURE — 99285 EMERGENCY DEPT VISIT HI MDM: CPT | Mod: 25 | Performed by: EMERGENCY MEDICINE

## 2024-09-03 PROCEDURE — 96374 THER/PROPH/DIAG INJ IV PUSH: CPT | Performed by: EMERGENCY MEDICINE

## 2024-09-03 RX ORDER — SUCRALFATE ORAL 1 G/10ML
1 SUSPENSION ORAL 4 TIMES DAILY PRN
Qty: 414 ML | Refills: 1 | Status: SHIPPED | OUTPATIENT
Start: 2024-09-03

## 2024-09-03 RX ORDER — NEEDLES, SAFETY 18GX1 1/2"
1 NEEDLE, DISPOSABLE MISCELLANEOUS
Qty: 30 EACH | Refills: 1 | Status: SHIPPED | OUTPATIENT
Start: 2024-09-03

## 2024-09-03 RX ORDER — CEFTRIAXONE 2 G/1
2 INJECTION, POWDER, FOR SOLUTION INTRAMUSCULAR; INTRAVENOUS AT BEDTIME
Status: DISCONTINUED | OUTPATIENT
Start: 2024-09-04 | End: 2024-09-10

## 2024-09-03 ASSESSMENT — COLUMBIA-SUICIDE SEVERITY RATING SCALE - C-SSRS
2. HAVE YOU ACTUALLY HAD ANY THOUGHTS OF KILLING YOURSELF IN THE PAST MONTH?: NO
1. IN THE PAST MONTH, HAVE YOU WISHED YOU WERE DEAD OR WISHED YOU COULD GO TO SLEEP AND NOT WAKE UP?: NO
6. HAVE YOU EVER DONE ANYTHING, STARTED TO DO ANYTHING, OR PREPARED TO DO ANYTHING TO END YOUR LIFE?: NO

## 2024-09-03 NOTE — TELEPHONE ENCOUNTER
Positive blood cultures, showing e coli, pansensitive.     Called but no answer.   Called different number and wife was aware of positive blood cultures, he is going to [facility].  They know him well. Nurses checked on him.   He knows he needs to get treatment, he is sick and fatigued.

## 2024-09-04 LAB
ALBUMIN SERPL BCG-MCNC: 3.9 G/DL (ref 3.5–5.2)
ALBUMIN SERPL BCG-MCNC: 3.9 G/DL (ref 3.5–5.2)
ALP SERPL-CCNC: 100 U/L (ref 40–150)
ALP SERPL-CCNC: 93 U/L (ref 40–150)
ALT SERPL W P-5'-P-CCNC: 14 U/L (ref 0–70)
ALT SERPL W P-5'-P-CCNC: 14 U/L (ref 0–70)
ANION GAP SERPL CALCULATED.3IONS-SCNC: 10 MMOL/L (ref 7–15)
ANION GAP SERPL CALCULATED.3IONS-SCNC: 12 MMOL/L (ref 7–15)
AST SERPL W P-5'-P-CCNC: 18 U/L (ref 0–45)
AST SERPL W P-5'-P-CCNC: 18 U/L (ref 0–45)
BASOPHILS # BLD AUTO: 0.1 10E3/UL (ref 0–0.2)
BASOPHILS NFR BLD AUTO: 1 %
BILIRUB SERPL-MCNC: 0.3 MG/DL
BILIRUB SERPL-MCNC: 0.4 MG/DL
BUN SERPL-MCNC: 11.9 MG/DL (ref 6–20)
BUN SERPL-MCNC: 12 MG/DL (ref 6–20)
CALCIUM SERPL-MCNC: 8.5 MG/DL (ref 8.8–10.4)
CALCIUM SERPL-MCNC: 8.6 MG/DL (ref 8.8–10.4)
CHLORIDE SERPL-SCNC: 106 MMOL/L (ref 98–107)
CHLORIDE SERPL-SCNC: 108 MMOL/L (ref 98–107)
CREAT SERPL-MCNC: 0.66 MG/DL (ref 0.67–1.17)
CREAT SERPL-MCNC: 0.82 MG/DL (ref 0.67–1.17)
EGFRCR SERPLBLD CKD-EPI 2021: >90 ML/MIN/1.73M2
EGFRCR SERPLBLD CKD-EPI 2021: >90 ML/MIN/1.73M2
EOSINOPHIL # BLD AUTO: 0.2 10E3/UL (ref 0–0.7)
EOSINOPHIL NFR BLD AUTO: 3 %
ERYTHROCYTE [DISTWIDTH] IN BLOOD BY AUTOMATED COUNT: 17.2 % (ref 10–15)
ERYTHROCYTE [DISTWIDTH] IN BLOOD BY AUTOMATED COUNT: 17.3 % (ref 10–15)
GLUCOSE SERPL-MCNC: 82 MG/DL (ref 70–99)
GLUCOSE SERPL-MCNC: 90 MG/DL (ref 70–99)
HCO3 SERPL-SCNC: 23 MMOL/L (ref 22–29)
HCO3 SERPL-SCNC: 25 MMOL/L (ref 22–29)
HCT VFR BLD AUTO: 35.7 % (ref 40–53)
HCT VFR BLD AUTO: 36.6 % (ref 40–53)
HGB BLD-MCNC: 11 G/DL (ref 13.3–17.7)
HGB BLD-MCNC: 11.4 G/DL (ref 13.3–17.7)
IMM GRANULOCYTES # BLD: 0 10E3/UL
IMM GRANULOCYTES NFR BLD: 0 %
LACTATE SERPL-SCNC: 0.5 MMOL/L (ref 0.7–2)
LYMPHOCYTES # BLD AUTO: 2.1 10E3/UL (ref 0.8–5.3)
LYMPHOCYTES NFR BLD AUTO: 31 %
MCH RBC QN AUTO: 28.9 PG (ref 26.5–33)
MCH RBC QN AUTO: 29.3 PG (ref 26.5–33)
MCHC RBC AUTO-ENTMCNC: 30.8 G/DL (ref 31.5–36.5)
MCHC RBC AUTO-ENTMCNC: 31.1 G/DL (ref 31.5–36.5)
MCV RBC AUTO: 94 FL (ref 78–100)
MCV RBC AUTO: 94 FL (ref 78–100)
MONOCYTES # BLD AUTO: 0.9 10E3/UL (ref 0–1.3)
MONOCYTES NFR BLD AUTO: 14 %
NEUTROPHILS # BLD AUTO: 3.4 10E3/UL (ref 1.6–8.3)
NEUTROPHILS NFR BLD AUTO: 51 %
NRBC # BLD AUTO: 0 10E3/UL
NRBC BLD AUTO-RTO: 0 /100
PLATELET # BLD AUTO: 240 10E3/UL (ref 150–450)
PLATELET # BLD AUTO: 248 10E3/UL (ref 150–450)
POTASSIUM SERPL-SCNC: 3.4 MMOL/L (ref 3.4–5.3)
POTASSIUM SERPL-SCNC: 3.6 MMOL/L (ref 3.4–5.3)
PROT SERPL-MCNC: 7 G/DL (ref 6.4–8.3)
PROT SERPL-MCNC: 7.2 G/DL (ref 6.4–8.3)
RBC # BLD AUTO: 3.8 10E6/UL (ref 4.4–5.9)
RBC # BLD AUTO: 3.89 10E6/UL (ref 4.4–5.9)
SODIUM SERPL-SCNC: 141 MMOL/L (ref 135–145)
SODIUM SERPL-SCNC: 143 MMOL/L (ref 135–145)
WBC # BLD AUTO: 6.6 10E3/UL (ref 4–11)
WBC # BLD AUTO: 6.7 10E3/UL (ref 4–11)

## 2024-09-04 PROCEDURE — 84155 ASSAY OF PROTEIN SERUM: CPT | Performed by: NURSE PRACTITIONER

## 2024-09-04 PROCEDURE — 36415 COLL VENOUS BLD VENIPUNCTURE: CPT | Performed by: EMERGENCY MEDICINE

## 2024-09-04 PROCEDURE — 87040 BLOOD CULTURE FOR BACTERIA: CPT | Performed by: EMERGENCY MEDICINE

## 2024-09-04 PROCEDURE — 250N000011 HC RX IP 250 OP 636: Performed by: EMERGENCY MEDICINE

## 2024-09-04 PROCEDURE — 120N000002 HC R&B MED SURG/OB UMMC

## 2024-09-04 PROCEDURE — 83605 ASSAY OF LACTIC ACID: CPT | Performed by: EMERGENCY MEDICINE

## 2024-09-04 PROCEDURE — 250N000013 HC RX MED GY IP 250 OP 250 PS 637: Performed by: NURSE PRACTITIONER

## 2024-09-04 PROCEDURE — 99222 1ST HOSP IP/OBS MODERATE 55: CPT | Mod: FS | Performed by: STUDENT IN AN ORGANIZED HEALTH CARE EDUCATION/TRAINING PROGRAM

## 2024-09-04 PROCEDURE — 258N000003 HC RX IP 258 OP 636: Performed by: EMERGENCY MEDICINE

## 2024-09-04 PROCEDURE — 85025 COMPLETE CBC W/AUTO DIFF WBC: CPT | Performed by: EMERGENCY MEDICINE

## 2024-09-04 PROCEDURE — 36415 COLL VENOUS BLD VENIPUNCTURE: CPT | Performed by: NURSE PRACTITIONER

## 2024-09-04 PROCEDURE — 80053 COMPREHEN METABOLIC PANEL: CPT | Performed by: EMERGENCY MEDICINE

## 2024-09-04 PROCEDURE — 99207 PR APP CREDIT; MD BILLING SHARED VISIT: CPT | Mod: FS | Performed by: NURSE PRACTITIONER

## 2024-09-04 PROCEDURE — 250N000011 HC RX IP 250 OP 636: Performed by: NURSE PRACTITIONER

## 2024-09-04 PROCEDURE — 85027 COMPLETE CBC AUTOMATED: CPT | Performed by: NURSE PRACTITIONER

## 2024-09-04 PROCEDURE — 99223 1ST HOSP IP/OBS HIGH 75: CPT | Performed by: INTERNAL MEDICINE

## 2024-09-04 PROCEDURE — 84460 ALANINE AMINO (ALT) (SGPT): CPT | Performed by: NURSE PRACTITIONER

## 2024-09-04 RX ORDER — ONDANSETRON 2 MG/ML
4 INJECTION INTRAMUSCULAR; INTRAVENOUS EVERY 6 HOURS PRN
Status: DISCONTINUED | OUTPATIENT
Start: 2024-09-04 | End: 2024-09-11 | Stop reason: HOSPADM

## 2024-09-04 RX ORDER — CALCIUM CARBONATE 500 MG/1
1000 TABLET, CHEWABLE ORAL 4 TIMES DAILY PRN
Status: DISCONTINUED | OUTPATIENT
Start: 2024-09-04 | End: 2024-09-11 | Stop reason: HOSPADM

## 2024-09-04 RX ORDER — NALOXONE HYDROCHLORIDE 0.4 MG/ML
0.4 INJECTION, SOLUTION INTRAMUSCULAR; INTRAVENOUS; SUBCUTANEOUS
Status: DISCONTINUED | OUTPATIENT
Start: 2024-09-04 | End: 2024-09-11 | Stop reason: HOSPADM

## 2024-09-04 RX ORDER — DEXTROAMPHETAMINE SACCHARATE, AMPHETAMINE ASPARTATE, DEXTROAMPHETAMINE SULFATE AND AMPHETAMINE SULFATE 5; 5; 5; 5 MG/1; MG/1; MG/1; MG/1
20 TABLET ORAL DAILY
Status: DISCONTINUED | OUTPATIENT
Start: 2024-09-04 | End: 2024-09-11 | Stop reason: HOSPADM

## 2024-09-04 RX ORDER — AMOXICILLIN 250 MG
2 CAPSULE ORAL 2 TIMES DAILY PRN
Status: DISCONTINUED | OUTPATIENT
Start: 2024-09-04 | End: 2024-09-11 | Stop reason: HOSPADM

## 2024-09-04 RX ORDER — LIDOCAINE 40 MG/G
CREAM TOPICAL
Status: DISCONTINUED | OUTPATIENT
Start: 2024-09-04 | End: 2024-09-11 | Stop reason: HOSPADM

## 2024-09-04 RX ORDER — CARVEDILOL 12.5 MG/1
12.5 TABLET ORAL 2 TIMES DAILY WITH MEALS
Status: DISCONTINUED | OUTPATIENT
Start: 2024-09-04 | End: 2024-09-11 | Stop reason: HOSPADM

## 2024-09-04 RX ORDER — ERGOCALCIFEROL 1.25 MG/1
50000 CAPSULE, LIQUID FILLED ORAL WEEKLY
Status: DISCONTINUED | OUTPATIENT
Start: 2024-09-09 | End: 2024-09-11 | Stop reason: HOSPADM

## 2024-09-04 RX ORDER — ACETAMINOPHEN 325 MG/1
650 TABLET ORAL EVERY 4 HOURS PRN
Status: DISCONTINUED | OUTPATIENT
Start: 2024-09-04 | End: 2024-09-11 | Stop reason: HOSPADM

## 2024-09-04 RX ORDER — CYANOCOBALAMIN 1000 UG/ML
1000 INJECTION, SOLUTION INTRAMUSCULAR; SUBCUTANEOUS
Status: DISCONTINUED | OUTPATIENT
Start: 2024-09-05 | End: 2024-09-11 | Stop reason: HOSPADM

## 2024-09-04 RX ORDER — SODIUM CHLORIDE 9 MG/ML
INJECTION, SOLUTION INTRAVENOUS
Status: DISPENSED
Start: 2024-09-04 | End: 2024-09-05

## 2024-09-04 RX ORDER — NALOXONE HYDROCHLORIDE 0.4 MG/ML
0.2 INJECTION, SOLUTION INTRAMUSCULAR; INTRAVENOUS; SUBCUTANEOUS
Status: DISCONTINUED | OUTPATIENT
Start: 2024-09-04 | End: 2024-09-11 | Stop reason: HOSPADM

## 2024-09-04 RX ORDER — FENTANYL 25 UG/1
25 PATCH TRANSDERMAL
Status: DISCONTINUED | OUTPATIENT
Start: 2024-09-04 | End: 2024-09-11 | Stop reason: HOSPADM

## 2024-09-04 RX ORDER — AMOXICILLIN 250 MG
1 CAPSULE ORAL 2 TIMES DAILY PRN
Status: DISCONTINUED | OUTPATIENT
Start: 2024-09-04 | End: 2024-09-11 | Stop reason: HOSPADM

## 2024-09-04 RX ORDER — POLYETHYLENE GLYCOL 3350 17 G/17G
17 POWDER, FOR SOLUTION ORAL 2 TIMES DAILY PRN
Status: DISCONTINUED | OUTPATIENT
Start: 2024-09-04 | End: 2024-09-11 | Stop reason: HOSPADM

## 2024-09-04 RX ORDER — ACETAMINOPHEN 650 MG/1
650 SUPPOSITORY RECTAL EVERY 4 HOURS PRN
Status: DISCONTINUED | OUTPATIENT
Start: 2024-09-04 | End: 2024-09-11 | Stop reason: HOSPADM

## 2024-09-04 RX ORDER — ENOXAPARIN SODIUM 100 MG/ML
80 INJECTION SUBCUTANEOUS 2 TIMES DAILY
Status: DISCONTINUED | OUTPATIENT
Start: 2024-09-04 | End: 2024-09-11 | Stop reason: HOSPADM

## 2024-09-04 RX ORDER — OXYCODONE HCL 5 MG/5 ML
5-10 SOLUTION, ORAL ORAL EVERY 4 HOURS PRN
Status: DISCONTINUED | OUTPATIENT
Start: 2024-09-04 | End: 2024-09-11 | Stop reason: HOSPADM

## 2024-09-04 RX ORDER — SUCRALFATE ORAL 1 G/10ML
1 SUSPENSION ORAL 4 TIMES DAILY PRN
Status: DISCONTINUED | OUTPATIENT
Start: 2024-09-04 | End: 2024-09-11 | Stop reason: HOSPADM

## 2024-09-04 RX ORDER — ONDANSETRON 4 MG/1
4 TABLET, ORALLY DISINTEGRATING ORAL EVERY 6 HOURS PRN
Status: DISCONTINUED | OUTPATIENT
Start: 2024-09-04 | End: 2024-09-11 | Stop reason: HOSPADM

## 2024-09-04 RX ORDER — ALPRAZOLAM 0.25 MG
0.5 TABLET ORAL 2 TIMES DAILY PRN
Status: DISCONTINUED | OUTPATIENT
Start: 2024-09-04 | End: 2024-09-11 | Stop reason: HOSPADM

## 2024-09-04 RX ADMIN — SODIUM CHLORIDE 1000 ML: 9 INJECTION, SOLUTION INTRAVENOUS at 00:02

## 2024-09-04 RX ADMIN — CEFTRIAXONE SODIUM 2 G: 2 INJECTION, POWDER, FOR SOLUTION INTRAMUSCULAR; INTRAVENOUS at 22:21

## 2024-09-04 RX ADMIN — CARVEDILOL 12.5 MG: 12.5 TABLET, FILM COATED ORAL at 17:07

## 2024-09-04 RX ADMIN — OXYCODONE HYDROCHLORIDE 10 MG: 5 SOLUTION ORAL at 22:18

## 2024-09-04 RX ADMIN — ONDANSETRON 4 MG: 4 TABLET, ORALLY DISINTEGRATING ORAL at 14:12

## 2024-09-04 RX ADMIN — ACETAMINOPHEN 650 MG: 325 TABLET ORAL at 14:12

## 2024-09-04 RX ADMIN — FENTANYL 1 PATCH: 25 PATCH TRANSDERMAL at 21:59

## 2024-09-04 RX ADMIN — OXYCODONE HYDROCHLORIDE 10 MG: 5 SOLUTION ORAL at 14:12

## 2024-09-04 RX ADMIN — ENOXAPARIN SODIUM 80 MG: 80 INJECTION SUBCUTANEOUS at 23:45

## 2024-09-04 RX ADMIN — CEFTRIAXONE SODIUM 2 G: 2 INJECTION, POWDER, FOR SOLUTION INTRAMUSCULAR; INTRAVENOUS at 00:06

## 2024-09-04 RX ADMIN — ALPRAZOLAM 0.5 MG: 0.25 TABLET ORAL at 22:19

## 2024-09-04 ASSESSMENT — ACTIVITIES OF DAILY LIVING (ADL)
ADLS_ACUITY_SCORE: 39
ADLS_ACUITY_SCORE: 28
ADLS_ACUITY_SCORE: 39
ADLS_ACUITY_SCORE: 28
ADLS_ACUITY_SCORE: 39
ADLS_ACUITY_SCORE: 39
ADLS_ACUITY_SCORE: 28
ADLS_ACUITY_SCORE: 39
ADLS_ACUITY_SCORE: 28
ADLS_ACUITY_SCORE: 39
ADLS_ACUITY_SCORE: 39
ADLS_ACUITY_SCORE: 28
ADLS_ACUITY_SCORE: 39
ADLS_ACUITY_SCORE: 28

## 2024-09-04 NOTE — PHARMACY-ADMISSION MEDICATION HISTORY
Pharmacist Admission Medication History    Admission medication history is complete. The information provided in this note is only as accurate as the sources available at the time of the update.    Information Source(s): Patient and his wife via phone, Medication fill history and [employer] ([phone]) for PPN and Intralipid    Pertinent Information: Intralipid 3 times a week on Wed, Fri and Sat    Changes made to PTA medication list:  Added: None  Deleted: Lidocaine patch and Pregabalin- not on them  Changed: TPN    Allergies reviewed with patient and updates made in EHR: yes    Medication History Completed By: Dane Meraz HALI 9/4/2024 3:16 PM    PTA Med List   Medication Sig Last Dose    albuterol (VENTOLIN HFA) 108 (90 Base) MCG/ACT inhaler Inhale 2 puffs into the lungs every 6 hours as needed More than a month    ALPRAZolam (XANAX) 0.5 MG tablet TAKE ONE TABLET BY MOUTH TWICE A DAY AS NEEDED FOR SLEEP OR ANXIETY 9/2/2024    amphetamine-dextroamphetamine (ADDERALL) 20 MG tablet TAKE ONE TABLET BY MOUTH ONCE DAILY 9/3/2024    carvedilol (COREG) 6.25 MG tablet TAKE 2 TABLETS (12.5 MG) BY MOUTH 2 TIMES DAILY WITH MEALS 9/3/2024    cyanocobalamin (CYANOCOBALAMIN) 1000 MCG/ML injection Inject 1 mL (1,000 mcg) into the muscle every 30 days 8/3/2024    enoxaparin ANTICOAGULANT (LOVENOX) 80 MG/0.8ML syringe Inject 0.8 mLs (80 mg) Subcutaneous 2 times daily INJECT THE CONTENTS OF ONE SYRINGE (80MG) UNDER THE SKIN TWO TIMES A DAY 9/3/2024    fentaNYL (DURAGESIC) 25 mcg/hr 72 hr patch Place 1 patch onto the skin every 48 hours 30 day supply for chronic pain. OK to fill 8/23/24 and start 8/25/24 9/2/2024    lipids plant base (CLINOLIPID) 20 % infusion Inject 250 mLs into the vein every 24 hours Past Week    naloxone (NARCAN) 4 MG/0.1ML nasal spray Spray 1 spray (4 mg) into one nostril alternating nostrils as needed for opioid reversal every 2-3 minutes until assistance arrives Unknown    nystatin (MYCOSTATIN) 383034  UNIT/GM external cream Apply topically daily as needed for other (around area of J tube) Past Week    ondansetron (ZOFRAN ODT) 8 MG ODT tab DISSOLVE ONE TABLET BY MOUTH EVERY 8 HOURS AS NEEDED FOR NAUSEA 9/3/2024    oxyCODONE (ROXICODONE) 5 MG/5ML solution Take 5-10 mLs (5-10 mg) by mouth every 4 hours as needed for moderate to severe pain Max of 40mg/day. 30 day supply for chronic pain. OK to fill 8/23/24 and start 8/25/24 9/3/2024    parenteral nutrition - PTA/DISCHARGE ORDER Managed by [employer] [phone]  Total volume: 1260 mL  Cycled over 12 hr  Clinisol 15% 100 gram/day  Dextrose 70% 175 gram/day  Na Acetate 35 mEq/day  K Chloride 85 mEq/day  Mg sulfate 6 mEq/day  Ca gluconate 25 mEq/day  MVI 10 mL/day  Trace 1 mL/day  Zinc 5 mg /day  Famotidine 20 mg/day  Cl:Ace=1: 2.4  Intralipid 20% 50 gram/day ([facility] only) 9/3/2024    polyethylene glycol (MIRALAX) 17 GM/Dose powder Take 17 g by mouth as needed for constipation More than a month    sucralfate (CARAFATE) 1 GM/10ML suspension Take 10 mLs (1 g) by mouth 4 times daily as needed for nausea. 9/3/2024    vitamin D2 (ERGOCALCIFEROL) 75345 units (1250 mcg) capsule Take 1 capsule (50,000 Units) by mouth once a week (Patient taking differently: Take 50,000 Units by mouth once a week. On Mondays) 9/2/2024

## 2024-09-04 NOTE — H&P
[facility]    History and Physical - Hospitalist Service, GOLD TEAM        Date of Admission:  9/3/2024    Assessment & Plan      Parker Acevedo is a 53 year old male admitted on 9/3/2024. He has a past medical history of Celestino-en-Y gastric bypass, malnutrition, chronic TPN, chronic pain, paroxysmal afib, anemia, ADHD and DVT. He presents to the ED due to positive blood culture at clinic.      # Suspected CLABSI, prior to admit  # Indwelling Cm catheter for TPN  He has had many prior admissions for CLABSI, last admitted 6/14/24 to 6/19/24 for line infection with methicillin sensitive staph epidermidis. He was seen at his clinic due to fevers, blood culture was done and came back positive for E coli. He was told to go to the ER. WBC 6.6, lactic acid 0.5. He was give a 1000 ml bolus and started on rocephin  - Repeat blood cultures, peripheral done in ED  - Continue Rocephin  - ID consult  - May require line removal and replacement  - CBC    # History of obesity, s/p Celestino-en-Y gastric bypass  # Short gut syndrome  # GERD  He has a J-tube for venting. He attempted tube feedings years ago but was unable to absorb it due to short gut syndrome. He is now maintenance on daily TPN and 3 times a week lipids. He eats about 300 calories daily  - Pharmacy consult for TPN/lipids  - CMP  - Zofran PRN  - PTA Carafate  - Regular diet    # Anemia  Hgb has been 11-13 and was 11.4 tonight  - CBC    # Chronic pain    - Continue fentanyl patch   - PTA oxycodone    # ADHD   - PTA amphetamine-dextroamphetamine     # Paroxysmal Afib  - Continue carvedilol 12.5mg BID    # DVT   Non occlusive deep venous thrombus is seen within the mid and proximal right subclavian vein and right internal jugular vein 7/4/23   -Continue enoxaparin as prescribed PTA         Diet: Combination Diet Regular Diet Adult  DVT Prophylaxis: Enoxaparin (Lovenox) SQ  Sanchez Catheter: Not present  Lines:  "Toi  Cardiac Monitoring: None  Code Status: Full Code      Disposition Plan   Medically Ready for Discharge: Anticipated in 2-4 Days         The patient's care was discussed with the Attending Physician, Dr. Torres .    SAILAJA [facility]  Hospitalist Service, [facility]  Securely message with [employer] (more info)  Text page via [employer] Paging/Directory   See signed in provider for up to date coverage information    ______________________________________________________________________    Chief Complaint   Cental line infection    History is obtained from the patient    History of Present Illness   Parker Acevedo is a 53 year old male who has a past medical history of past medical history of Celestino-en-Y gastric bypass, malnutrition, chronic TPN, chronic pain, paroxysmal afib, anemia, ADHD and DVT. He presents to the ED due to positive blood culture at clinic. He has had numerous previous admissions for line infections and states that he has \"had over 200 lines placed\" since his gastric bypass. He has chronic abdominal pain that is maintained on fentanyl patch and oxycodone. He currently states that he feels well, but was concerned that he had a fever with his infusion. He states that his other medical conditions are well managed.       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    Complication of anesthesia     Difficulty swallowing     Gastric ulcer, unspecified as acute or chronic, without mention of hemorrhage, perforation, or obstruction     Gastro-oesophageal reflux disease     Head injury     Hiatal hernia     Other bladder disorder     Other chronic pain  "    PONV (postoperative nausea and vomiting)     Severe malnutrition (H24)     TPN    Short gut syndrome     Tobacco abuse        Past Surgical History   Past Surgical History:   Procedure Laterality Date    AMPUTATION      APPENDECTOMY      BACK SURGERY  11/3/2014    curve in the spine    BIOPSY LYMPH NODE CERVICAL N/A 2/20/2015    Procedure: BIOPSY LYMPH NODE CERVICAL;  Surgeon: Baron Scanlon MD;  [facility]    CHOLECYSTECTOMY      COLONOSCOPY N/A 7/14/2021    Procedure: COLONOSCOPY;  Surgeon: Jimbo Estrada MD;  Location: [facility] OR    COLONOSCOPY N/A 4/13/2022    Procedure: COLONOSCOPY;  Surgeon: Jimbo Estrada MD;  Location: [facility] OR    COLONOSCOPY N/A 9/19/2023    Procedure: Colonoscopy;  Surgeon: Jimbo Estrada MD;  Location: [facility] OR    DISCECTOMY, FUSION CERVICAL ANTERIOR ONE LEVEL, COMBINED N/A 2/15/2017    Procedure: COMBINED DISCECTOMY, FUSION CERVICAL ANTERIOR ONE LEVEL;  Surgeon: Darren Campos MD;  [facility]    ENDOSCOPIC INSERTION TUBE GASTROSTOMY  9/9/2013    Procedure: ENDOSCOPIC INSERTION TUBE GASTROSTOMY;;  Surgeon: Francis Vyas MD;  [facility]    ENDOSCOPIC ULTRASOUND UPPER GASTROINTESTINAL TRACT (GI)  4/29/2011    Procedure:ENDOSCOPIC ULTRASOUND UPPER GASTROINTESTINAL TRACT (GI); Both Procedures done Conjointly; Surgeon:NEREIDA HOUSER; [facility]    ENDOSCOPIC ULTRASOUND UPPER GASTROINTESTINAL TRACT (GI)  9/9/2013    Procedure: ENDOSCOPIC ULTRASOUND UPPER GASTROINTESTINAL TRACT (GI);  Endoscopic Ultrasound Guide Gastrostomy Tube Placement  C-arm;  Surgeon: Noe Lizarraga MD;  [facility]    ENDOSCOPIC ULTRASOUND UPPER GASTROINTESTINAL TRACT (GI) N/A 2/24/2021    Procedure: ENDOSCOPIC ULTRASOUND, ESOPHAGOSCOPY / UPPER GASTROINTESTINAL TRACT (GI), esophagastrogastroduodenoscopy;  Surgeon: Berny Bach MD;  [facility]    ENDOSCOPY  03/25/11    EGD, [facility]    ENDOSCOPY  08/04/09    Upper  Endoscopy, [facility]    ENDOSCOPY  01/05/09    Upper Endoscopy, [facility]    ESOPHAGOSCOPY, GASTROSCOPY, DUODENOSCOPY (EGD), COMBINED  4/20/2011    Procedure:COMBINED ESOPHAGOSCOPY, GASTROSCOPY, DUODENOSCOPY (EGD); Surgeon:BLU VYAS; [facility]    ESOPHAGOSCOPY, GASTROSCOPY, DUODENOSCOPY (EGD), COMBINED  6/15/2011    Procedure:COMBINED ESOPHAGOSCOPY, GASTROSCOPY, DUODENOSCOPY (EGD); Surgeon:BLU VYAS; [facility]    ESOPHAGOSCOPY, GASTROSCOPY, DUODENOSCOPY (EGD), COMBINED  6/12/2013    Procedure: COMBINED ESOPHAGOSCOPY, GASTROSCOPY, DUODENOSCOPY (EGD);;  Surgeon: Blu Vyas MD;  [facility]    ESOPHAGOSCOPY, GASTROSCOPY, DUODENOSCOPY (EGD), COMBINED  11/22/2013    Procedure: COMBINED ESOPHAGOSCOPY, GASTROSCOPY, DUODENOSCOPY (EGD);;  Surgeon: Blu Vyas MD;  [facility]    ESOPHAGOSCOPY, GASTROSCOPY, DUODENOSCOPY (EGD), COMBINED  4/30/2014    Procedure: COMBINED ESOPHAGOSCOPY, GASTROSCOPY, DUODENOSCOPY (EGD);  Surgeon: Blu Vyas MD;  [facility]    ESOPHAGOSCOPY, GASTROSCOPY, DUODENOSCOPY (EGD), COMBINED N/A 2/20/2015    Procedure: COMBINED ESOPHAGOSCOPY, GASTROSCOPY, DUODENOSCOPY (EGD), BIOPSY SINGLE OR MULTIPLE;  Surgeon: Baron Scanlon MD;  Location:  OR    ESOPHAGOSCOPY, GASTROSCOPY, DUODENOSCOPY (EGD), COMBINED N/A 9/30/2015    Procedure: COMBINED ESOPHAGOSCOPY, GASTROSCOPY, DUODENOSCOPY (EGD);  Surgeon: Blu Vyas MD;  [facility]    ESOPHAGOSCOPY, GASTROSCOPY, DUODENOSCOPY (EGD), COMBINED N/A 10/3/2019    Procedure: Upper Endoscopy;  Surgeon: Clif Morrow MD;  [facility]    GASTRECTOMY  6/22/2012    Procedure: GASTRECTOMY;  Open Approach, Excise Ulcers,Partial Gastrectomy, Esophagojejunostomy, Hiatal Hernia Repair, Extensive Lysis of Adhesions and Esaphagogastrodudenoscopy.;  Surgeon: Blu Vyas MD;  [facility]    GASTROJEJUNOSTOMY  08/26/09    Extensice enterolysis,  partial resect. jejunum, part. resect gastric pouch, gastrojejunostomy anastomosis    HC ESOPH/GAS REFLUX TEST W NASAL IMPED ELECTRODE  8/5/2013    Procedure: ESOPHAGEAL IMPEDENCE FUNCTION TEST 1 HOUR OR LESS;  Surgeon: Halie Lang MD;  [facility]    HEAD & NECK SURGERY  2/15/2017    C5-C6    HERNIA REPAIR  2006    Umbilical hernia    HERNIORRHAPHY HIATAL  6/22/2012    Procedure: HERNIORRHAPHY HIATAL;;  Surgeon: Francis Vyas MD;  [facility]    HERNIORRHAPHY INGUINAL  11/22/2013    Procedure: HERNIORRHAPHY INGUINAL;;  Surgeon: Francis Vyas MD;  [facility]    INSERT PICC LINE Right 12/19/2019    Procedure: Picc Placement;  Surgeon: Per Dumont PA-C;  [facility]    INSERT PICC LINE Right 2/21/2020    Procedure: INSERTION, PICC;  Surgeon: Per Dumont PA-C;  [facility]    INSERT PORT VASCULAR ACCESS Right 12/19/2017    Procedure: INSERT PORT VASCULAR ACCESS;  Right Chest Port Placement ;  Surgeon: Lisandro Alejandro PA-C;  [facility]    INSERT PORT VASCULAR ACCESS Right 8/2/2018    Procedure: INSERT PORT VASCULAR ACCESS;  Place single lumen tunneled central venous access catheter;  Surgeon: Guy Jamil PA-C;  [facility]    IR CVC TUNNEL PLACEMENT > 5 YRS OF AGE  8/7/2019    IR CVC TUNNEL PLACEMENT > 5 YRS OF AGE  4/14/2020    IR CVC TUNNEL PLACEMENT > 5 YRS OF AGE  8/3/2020    IR CVC TUNNEL PLACEMENT > 5 YRS OF AGE  9/4/2020    IR CVC TUNNEL PLACEMENT > 5 YRS OF AGE  2/5/2021    IR CVC TUNNEL PLACEMENT > 5 YRS OF AGE  3/23/2021    IR CVC TUNNEL PLACEMENT > 5 YRS OF AGE  1/13/2022    IR CVC TUNNEL PLACEMENT > 5 YRS OF AGE  5/12/2022    IR CVC TUNNEL PLACEMENT > 5 YRS OF AGE  7/13/2022    IR CVC TUNNEL PLACEMENT > 5 YRS OF AGE  7/10/2023    IR CVC TUNNEL PLACEMENT > 5 YRS OF AGE  11/17/2023    IR CVC TUNNEL PLACEMENT > 5 YRS OF AGE  1/25/2024    IR CVC TUNNEL PLACEMENT > 5 YRS OF AGE  6/19/2024    IR CVC TUNNEL REMOVAL LEFT   6/7/2023    IR CVC TUNNEL REMOVAL LEFT  11/14/2023    IR CVC TUNNEL REMOVAL LEFT  1/22/2024    IR CVC TUNNEL REMOVAL RIGHT  10/1/2019    IR CVC TUNNEL REMOVAL RIGHT  7/30/2020    IR CVC TUNNEL REMOVAL RIGHT  9/2/2020    IR CVC TUNNEL REMOVAL RIGHT  2/3/2021    IR CVC TUNNEL REMOVAL RIGHT  3/19/2021    IR CVC TUNNEL REMOVAL RIGHT  1/10/2022    IR CVC TUNNEL REMOVAL RIGHT  5/9/2022    IR CVC TUNNEL REMOVAL RIGHT  7/8/2022    IR CVC TUNNEL REMOVAL RIGHT  6/17/2024    IR CVC TUNNEL REVISION LEFT  8/10/2022    IR CVC TUNNEL REVISION LEFT  9/19/2023    IR CVC TUNNEL REVISION LEFT  8/12/2024    IR CVC TUNNEL REVISION RIGHT  5/7/2021    IR FOLLOW UP VISIT OUTPATIENT  8/7/2019    IR PICC EXCHANGE LEFT  6/8/2023    IR PICC PLACEMENT > 5 YRS OF AGE  3/7/2019    IR PICC PLACEMENT > 5 YRS OF AGE  12/19/2019    IR PICC PLACEMENT > 5 YRS OF AGE  2/21/2020    IR PICC PLACEMENT > 5 YRS OF AGE  6/7/2023    LAPAROTOMY EXPLORATORY  11/22/2013    Procedure: LAPAROTOMY EXPLORATORY;  Exploratory Laparotomy, Upper Endoscopy, Left Inguinal Hernia Repair;  Surgeon: Francis Vyas MD;  [facility]    LAPAROTOMY EXPLORATORY N/A 9/30/2023    Procedure: Laparotomy exploratory, reduction of intussusception, resection of small bowel with primary anastamosis, upper endoscopy;  Surgeon: Delvis Mercado MD;  [facility]    ORTHOPEDIC SURGERY      PICC INSERTION Right 03/16/2017    5fr DL BioFlo PICC, 42cm (3cm external) in the R medial brachial vein w/ tip in the SVC RA junction.    PICC INSERTION Left 09/23/2017    5fr DL BioFlo PICC, 45cm (1cm external) in the L basilic vein w/ tip in the SVC RA junction.    PICC INSERTION Right 05/16/2019    5Fr - 43cm, Medial brachial vein, low SVC    PICC INSERTION Right 10/02/2019    5Fr - 43cm (2cm external), basilic vein, low SVC    SHAYLEE EN Y BOWEL  2003    SOFT TISSUE SURGERY      THORACIC SURGERY      TONSILLECTOMY      TRANSESOPHAGEAL ECHOCARDIOGRAM INTRAOPERATIVE N/A 1/8/2019     "Procedure: TRANSESOPHAGEAL ECHOCARDIOGRAM INTRAOPERATIVE;  Surgeon: GENERIC ANESTHESIA PROVIDER;  [facility]    TRANSESOPHAGEAL ECHOCARDIOGRAM INTRAOPERATIVE N/A 2023    Procedure: Transesophageal echocardiogram in the OR;  Surgeon: GENERIC ANESTHESIA PROVIDER;  [facility]    TRANSESOPHAGEAL ECHOCARDIOGRAM INTRAOPERATIVE N/A 2023    Procedure: ECHOCARDIOGRAM,TRANSESOPHAGEAL,WITH ANESTHESIA;  Surgeon: GENERIC ANESTHESIA PROVIDER;  [facility]    ZZC GASTRIC BYPASS,OBESE<100CM SHAYLEE-EN-Y  2002    lost 300 pounds       Prior to Admission Medications   Prior to Admission Medications   Prescriptions Last Dose Informant Patient Reported? Taking?   ALPRAZolam (XANAX) 0.5 MG tablet   No No   Sig: TAKE ONE TABLET BY MOUTH TWICE A DAY AS NEEDED FOR SLEEP OR ANXIETY   [place] MANAGED PATIENT  Spouse/Significant Other, Self No No   Sig: Contact [facility] for patient specific medication information at [phone] on admission and discharge from the hospital.  Phones are answered 24 hours a day 7 days a week 365 days a year.    Providers - Choose \"CONTINUE HOME MED (no script)\" at discharge if patient treatment with home infusion will continue.   Lidocaine (LIDOCARE) 4 % Patch  Self Yes No   Sig: Place 1 patch onto the skin daily as needed for moderate pain To prevent lidocaine toxicity, patient should be patch free for 12 hrs daily.   Syringe/Needle, Disp, (B-D ECLIPSE SYRINGE) 27G X 1/2\" 1 ML MISC   No No   Si Device every 30 days.   albuterol (VENTOLIN HFA) 108 (90 Base) MCG/ACT inhaler  Spouse/Significant Other, Self No No   Sig: Inhale 2 puffs into the lungs every 6 hours as needed   amphetamine-dextroamphetamine (ADDERALL) 20 MG tablet   No No   Sig: TAKE ONE TABLET BY MOUTH ONCE DAILY   carvedilol (COREG) 6.25 MG tablet   No No   Sig: TAKE 2 TABLETS (12.5 MG) BY MOUTH 2 TIMES DAILY WITH MEALS   cyanocobalamin (CYANOCOBALAMIN) 1000 MCG/ML injection   No No   Sig: " Inject 1 mL (1,000 mcg) into the muscle every 30 days   enoxaparin ANTICOAGULANT (LOVENOX) 80 MG/0.8ML syringe  Self No No   Sig: Inject 0.8 mLs (80 mg) Subcutaneous 2 times daily INJECT THE CONTENTS OF ONE SYRINGE (80MG) UNDER THE SKIN TWO TIMES A DAY   fentaNYL (DURAGESIC) 25 mcg/hr 72 hr patch   No No   Sig: Place 1 patch onto the skin every 48 hours 30 day supply for chronic pain. OK to fill 8/23/24 and start 8/25/24   lipids plant base (CLINOLIPID) 20 % infusion  Self No No   Sig: Inject 250 mLs into the vein every 24 hours   naloxone (NARCAN) 4 MG/0.1ML nasal spray  Self No No   Sig: Spray 1 spray (4 mg) into one nostril alternating nostrils as needed for opioid reversal every 2-3 minutes until assistance arrives   nystatin (MYCOSTATIN) 514177 UNIT/GM external cream   Yes No   Sig: Apply topically daily as needed for other (around area of J tube)   ondansetron (ZOFRAN ODT) 8 MG ODT tab   No No   Sig: DISSOLVE ONE TABLET BY MOUTH EVERY 8 HOURS AS NEEDED FOR NAUSEA   oxyCODONE (ROXICODONE) 5 MG/5ML solution   No No   Sig: Take 5-10 mLs (5-10 mg) by mouth every 4 hours as needed for moderate to severe pain Max of 40mg/day. 30 day supply for chronic pain. OK to fill 8/23/24 and start 8/25/24   polyethylene glycol (MIRALAX) 17 GM/Dose powder  Self Yes No   Sig: Take 17 g by mouth as needed for constipation   pregabalin (LYRICA) 25 MG capsule   No No   Sig: Take one capsule at bedtime for 7 days, then take 1 capsule twice daily.   sucralfate (CARAFATE) 1 GM/10ML suspension   No No   Sig: Take 10 mLs (1 g) by mouth 4 times daily as needed for nausea.   vitamin D2 (ERGOCALCIFEROL) 59071 units (1250 mcg) capsule  Self No No   Sig: Take 1 capsule (50,000 Units) by mouth once a week      Facility-Administered Medications: None        Review of Systems    The 10 point Review of Systems is negative other than noted in the HPI or here.      Physical Exam   Vital Signs: Temp: 99  F (37.2  C) Temp src: Oral BP: (!) 133/91  Pulse: 83   Resp: 16 SpO2: 98 % O2 Device: None (Room air)    Weight: 0 lbs 0 oz    Physical Exam   Constitutional:   Resting comfortably   Cardiovascular: Regular rate and rhythm without murmurs or gallops  Pulmonary/Chest: Clear to auscultation bilaterally, with no wheezes or retractions. No respiratory distress.  GI: Soft with good bowel sounds.  Non-tender, non-distended, with no guarding, no rebound, no peritoneal signs. J-tube in place  Musculoskeletal:  2+ LE edema (baseline)  Skin: Skin is warm and dry. No rash noted.   Neurological: Alert and oriented to person, place, and time. Nonfocal exam  Psychiatric:  Normal mood and affect.      Medical Decision Making     55 MINUTES SPENT BY ME on the date of service doing chart review, history, exam, documentation & further activities per the note.      Data     I have personally reviewed the following data over the past 24 hrs:    6.6  \   11.4 (L)   / 248     141 106 11.9 /  90   3.4 25 0.82 \     ALT: 14 AST: 18 AP: 100 TBILI: 0.3   ALB: 3.9 TOT PROTEIN: 7.2 LIPASE: N/A     Procal: N/A CRP: N/A Lactic Acid: 0.5 (L)         Imaging results reviewed over the past 24 hrs:   No results found for this or any previous visit (from the past 24 hour(s)).

## 2024-09-04 NOTE — PROGRESS NOTES
VS: BP (!) 142/95 (BP Location: Left arm)   Pulse 52   Temp 98.8  F (37.1  C) (Oral)   Resp 18   SpO2 99%     Orientation Pt is Alert and Oriented x 4   Respiratory Lung sounds clear bilaterally. No cough. Denies SOB, no accessory muscles used. Pt saturations 99% on room air    Mobility/ Activity ind   Bowel/Bladder: Continent of both bowel and bladder. Last BM 8/28 pt states this is normal for him and often goes longer than a week without a bowel movement    IV /LDA R AC PIV SL   Diet Regular diet, TPN 3x a week    Gastrointestinal Hx gastric bypass, abdomen soft and non tender vented j tube in place draining yellow/green fluid. Pt states he feels bloated. Having intermittent nausea   Skin / Wounds / Dressings: No outstanding skin issues noted.    Pain Rates pain 6/10   Equipment: na   Circulation/ Sensation Neuropathic tingling in toes    PRNS Oxy, Tylenol, Zofran    Plan Continue to monitor. Plans to remove infected Mc line.    Additional info: Pt refuses continuous pulseox. Teeth missing

## 2024-09-04 NOTE — CONSULTS
"    General ID Service: Initial Consultation     Patient:  Parker Acevedo, Date of birth 1970, Medical record number [id number]  Date of Visit:  September 4, 2024         Assessment and Recommendations:   ID Problem List:  Celestino-en-Y gastric bypass with chronic malnutrition on TPN chronically  Many blood stream infections from his vascular catheters  E coli bacteremia felt to be associated with azevedo catheter    Recommendations:    -Agree with pulling azevedo and giving him a 48-72 hour line holiday  -Agree with IV ceftriaxone.   -Monitor blood culture already taken and repeat it if it becomes positive.     Discussion:    Patient on chronic TPN and thus requiring IV access. Has had many, many infections. There is some concern as to why he keeps getting them. I will try to build some more rapport and inquire further.     Otherwise the plan already in progress is good. He should get the catheter removed. He needs a line holiday until his cultures are negative for 48-72 hours. He can then get another line placed. He can be on PPN until then. He should get ceftriaxone IV. There are oral options but given his TPN dependence I think we should do IV antibiotics.     I will follow along. Please call with questions.     Attestation:  I have reviewed today's vital signs, medications, labs and imaging.  Estelle Sanchez MD  Pager [phone]         History of Present Illness:     From H&P  \"Parker Acveedo is a 53 year old male who has a past medical history of past medical history of Celestino-en-Y gastric bypass, malnutrition, chronic TPN, chronic pain, paroxysmal afib, anemia, ADHD and DVT. He presents to the ED due to positive blood culture at clinic. He has had numerous previous admissions for line infections and states that he has \"had over 200 lines placed\" since his gastric bypass. He has chronic abdominal pain that is maintained on fentanyl patch and oxycodone. He currently states that he feels well, but was " "concerned that he had a fever with his infusion. He states that his other medical conditions are well managed.\"       The patient reports he was having chills and fatigue. He would get fevers to 101 with putting anything through the line.     He reports his wife gives him TPN and flushes. She says she wears gloves and cleans with alcohol pads before handling the line. The patient says he uses alcohol hand  before touching line.     He was saying he would pull the line himself but reported he was joking. We requested he wait for IR which he said he would that he was joking.          Review of Systems:   Full 12 point ROS obtained, pertinent positives and negatives as above.       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    Complication of anesthesia     Difficulty swallowing     Gastric ulcer, unspecified as acute or chronic, without mention of hemorrhage, perforation, or obstruction     Gastro-oesophageal reflux disease     Head injury     Hiatal hernia     Other bladder disorder     Other chronic pain     PONV (postoperative nausea and vomiting)     Severe malnutrition (H24)     TPN    Short gut syndrome     Tobacco abuse      Past Surgical History:   Procedure Laterality Date    AMPUTATION      APPENDECTOMY      BACK SURGERY  11/3/2014    curve in the spine    BIOPSY LYMPH NODE CERVICAL N/A 2/20/2015    Procedure: BIOPSY LYMPH NODE CERVICAL;  Surgeon: Baron Scanlon MD;  [facility]    CHOLECYSTECTOMY      COLONOSCOPY N/A 7/14/2021    Procedure: COLONOSCOPY;  Surgeon: Jimbo Estrada MD;  Location: [facility] OR    COLONOSCOPY N/A 4/13/2022    Procedure: COLONOSCOPY;  Surgeon: Jimbo Estrada MD;  " Location: [facility] OR    COLONOSCOPY N/A 9/19/2023    Procedure: Colonoscopy;  Surgeon: Jimbo Estrada MD;  Location: [facility] OR    DISCECTOMY, FUSION CERVICAL ANTERIOR ONE LEVEL, COMBINED N/A 2/15/2017    Procedure: COMBINED DISCECTOMY, FUSION CERVICAL ANTERIOR ONE LEVEL;  Surgeon: Darren Campos MD;  [facility]    ENDOSCOPIC INSERTION TUBE GASTROSTOMY  9/9/2013    Procedure: ENDOSCOPIC INSERTION TUBE GASTROSTOMY;;  Surgeon: Francis Vyas MD;  [facility]    ENDOSCOPIC ULTRASOUND UPPER GASTROINTESTINAL TRACT (GI)  4/29/2011    Procedure:ENDOSCOPIC ULTRASOUND UPPER GASTROINTESTINAL TRACT (GI); Both Procedures done Conjointly; Surgeon:NEREIDA HOUSER; [facility]    ENDOSCOPIC ULTRASOUND UPPER GASTROINTESTINAL TRACT (GI)  9/9/2013    Procedure: ENDOSCOPIC ULTRASOUND UPPER GASTROINTESTINAL TRACT (GI);  Endoscopic Ultrasound Guide Gastrostomy Tube Placement  C-arm;  Surgeon: Noe Lizarraga MD;  [facility]    ENDOSCOPIC ULTRASOUND UPPER GASTROINTESTINAL TRACT (GI) N/A 2/24/2021    Procedure: ENDOSCOPIC ULTRASOUND, ESOPHAGOSCOPY / UPPER GASTROINTESTINAL TRACT (GI), esophagastrogastroduodenoscopy;  Surgeon: Berny Bach MD;  [facility]    ENDOSCOPY  03/25/11    EGD, [facility]    ENDOSCOPY  08/04/09    Upper Endoscopy, [facility]    ENDOSCOPY  01/05/09    Upper Endoscopy, [facility]    ESOPHAGOSCOPY, GASTROSCOPY, DUODENOSCOPY (EGD), COMBINED  4/20/2011    Procedure:COMBINED ESOPHAGOSCOPY, GASTROSCOPY, DUODENOSCOPY (EGD); Surgeon:FRANCIS VYAS; [facility]    ESOPHAGOSCOPY, GASTROSCOPY, DUODENOSCOPY (EGD), COMBINED  6/15/2011    Procedure:COMBINED ESOPHAGOSCOPY, GASTROSCOPY, DUODENOSCOPY (EGD); Surgeon:FRANCIS VYAS; [facility]    ESOPHAGOSCOPY, GASTROSCOPY, DUODENOSCOPY (EGD), COMBINED  6/12/2013    Procedure: COMBINED ESOPHAGOSCOPY, GASTROSCOPY, DUODENOSCOPY (EGD);;  Surgeon: Francis Vyas MD;  [facility]     ESOPHAGOSCOPY, GASTROSCOPY, DUODENOSCOPY (EGD), COMBINED  11/22/2013    Procedure: COMBINED ESOPHAGOSCOPY, GASTROSCOPY, DUODENOSCOPY (EGD);;  Surgeon: Francis Vyas MD;  [facility]    ESOPHAGOSCOPY, GASTROSCOPY, DUODENOSCOPY (EGD), COMBINED  4/30/2014    Procedure: COMBINED ESOPHAGOSCOPY, GASTROSCOPY, DUODENOSCOPY (EGD);  Surgeon: Francis Vyas MD;  Location:  GI    ESOPHAGOSCOPY, GASTROSCOPY, DUODENOSCOPY (EGD), COMBINED N/A 2/20/2015    Procedure: COMBINED ESOPHAGOSCOPY, GASTROSCOPY, DUODENOSCOPY (EGD), BIOPSY SINGLE OR MULTIPLE;  Surgeon: Baron Scanlon MD;  Location:  OR    ESOPHAGOSCOPY, GASTROSCOPY, DUODENOSCOPY (EGD), COMBINED N/A 9/30/2015    Procedure: COMBINED ESOPHAGOSCOPY, GASTROSCOPY, DUODENOSCOPY (EGD);  Surgeon: Francis Vyas MD;  Location:  GI    ESOPHAGOSCOPY, GASTROSCOPY, DUODENOSCOPY (EGD), COMBINED N/A 10/3/2019    Procedure: Upper Endoscopy;  Surgeon: Clif Morrow MD;  Location:  OR    GASTRECTOMY  6/22/2012    Procedure: GASTRECTOMY;  Open Approach, Excise Ulcers,Partial Gastrectomy, Esophagojejunostomy, Hiatal Hernia Repair, Extensive Lysis of Adhesions and Esaphagogastrodudenoscopy.;  Surgeon: Francis Vyas MD;  [facility]    GASTROJEJUNOSTOMY  08/26/09    Extensice enterolysis, partial resect. jejunum, part. resect gastric pouch, gastrojejunostomy anastomosis    HC ESOPH/GAS REFLUX TEST W NASAL IMPED ELECTRODE  8/5/2013    Procedure: ESOPHAGEAL IMPEDENCE FUNCTION TEST 1 HOUR OR LESS;  Surgeon: Halie Lang MD;  Location:  GI    HEAD & NECK SURGERY  2/15/2017    C5-C6    HERNIA REPAIR  2006    Umbilical hernia    HERNIORRHAPHY HIATAL  6/22/2012    Procedure: HERNIORRHAPHY HIATAL;;  Surgeon: Francis Vyas MD;  Location:  OR    HERNIORRHAPHY INGUINAL  11/22/2013    Procedure: HERNIORRHAPHY INGUINAL;;  Surgeon: Francis Vyas MD;  [facility]    INSERT PICC LINE Right 12/19/2019    Procedure: Picc  Placement;  Surgeon: Per Dumont PA-C;  [facility]    INSERT PICC LINE Right 2/21/2020    Procedure: INSERTION, PICC;  Surgeon: Per Dumont PA-C;  [facility]    INSERT PORT VASCULAR ACCESS Right 12/19/2017    Procedure: INSERT PORT VASCULAR ACCESS;  Right Chest Port Placement ;  Surgeon: Lisandro Alejandro PA-C;  [facility]    INSERT PORT VASCULAR ACCESS Right 8/2/2018    Procedure: INSERT PORT VASCULAR ACCESS;  Place single lumen tunneled central venous access catheter;  Surgeon: Guy Jamil PA-C;  [facility]    IR CVC TUNNEL PLACEMENT > 5 YRS OF AGE  8/7/2019    IR CVC TUNNEL PLACEMENT > 5 YRS OF AGE  4/14/2020    IR CVC TUNNEL PLACEMENT > 5 YRS OF AGE  8/3/2020    IR CVC TUNNEL PLACEMENT > 5 YRS OF AGE  9/4/2020    IR CVC TUNNEL PLACEMENT > 5 YRS OF AGE  2/5/2021    IR CVC TUNNEL PLACEMENT > 5 YRS OF AGE  3/23/2021    IR CVC TUNNEL PLACEMENT > 5 YRS OF AGE  1/13/2022    IR CVC TUNNEL PLACEMENT > 5 YRS OF AGE  5/12/2022    IR CVC TUNNEL PLACEMENT > 5 YRS OF AGE  7/13/2022    IR CVC TUNNEL PLACEMENT > 5 YRS OF AGE  7/10/2023    IR CVC TUNNEL PLACEMENT > 5 YRS OF AGE  11/17/2023    IR CVC TUNNEL PLACEMENT > 5 YRS OF AGE  1/25/2024    IR CVC TUNNEL PLACEMENT > 5 YRS OF AGE  6/19/2024    IR CVC TUNNEL REMOVAL LEFT  6/7/2023    IR CVC TUNNEL REMOVAL LEFT  11/14/2023    IR CVC TUNNEL REMOVAL LEFT  1/22/2024    IR CVC TUNNEL REMOVAL RIGHT  10/1/2019    IR CVC TUNNEL REMOVAL RIGHT  7/30/2020    IR CVC TUNNEL REMOVAL RIGHT  9/2/2020    IR CVC TUNNEL REMOVAL RIGHT  2/3/2021    IR CVC TUNNEL REMOVAL RIGHT  3/19/2021    IR CVC TUNNEL REMOVAL RIGHT  1/10/2022    IR CVC TUNNEL REMOVAL RIGHT  5/9/2022    IR CVC TUNNEL REMOVAL RIGHT  7/8/2022    IR CVC TUNNEL REMOVAL RIGHT  6/17/2024    IR CVC TUNNEL REVISION LEFT  8/10/2022    IR CVC TUNNEL REVISION LEFT  9/19/2023    IR CVC TUNNEL REVISION LEFT  8/12/2024    IR CVC TUNNEL REVISION RIGHT  5/7/2021    IR FOLLOW UP VISIT  OUTPATIENT  8/7/2019    IR PICC EXCHANGE LEFT  6/8/2023    IR PICC PLACEMENT > 5 YRS OF AGE  3/7/2019    IR PICC PLACEMENT > 5 YRS OF AGE  12/19/2019    IR PICC PLACEMENT > 5 YRS OF AGE  2/21/2020    IR PICC PLACEMENT > 5 YRS OF AGE  6/7/2023    LAPAROTOMY EXPLORATORY  11/22/2013    Procedure: LAPAROTOMY EXPLORATORY;  Exploratory Laparotomy, Upper Endoscopy, Left Inguinal Hernia Repair;  Surgeon: Francis Vyas MD;  [facility]    LAPAROTOMY EXPLORATORY N/A 9/30/2023    Procedure: Laparotomy exploratory, reduction of intussusception, resection of small bowel with primary anastamosis, upper endoscopy;  Surgeon: Delvis Mercado MD;  [facility]    ORTHOPEDIC SURGERY      PICC INSERTION Right 03/16/2017    5fr DL BioFlo PICC, 42cm (3cm external) in the R medial brachial vein w/ tip in the SVC RA junction.    PICC INSERTION Left 09/23/2017    5fr DL BioFlo PICC, 45cm (1cm external) in the L basilic vein w/ tip in the SVC RA junction.    PICC INSERTION Right 05/16/2019    5Fr - 43cm, Medial brachial vein, low SVC    PICC INSERTION Right 10/02/2019    5Fr - 43cm (2cm external), basilic vein, low SVC    SHAYLEE EN Y BOWEL  2003    SOFT TISSUE SURGERY      THORACIC SURGERY      TONSILLECTOMY      TRANSESOPHAGEAL ECHOCARDIOGRAM INTRAOPERATIVE N/A 1/8/2019    Procedure: TRANSESOPHAGEAL ECHOCARDIOGRAM INTRAOPERATIVE;  Surgeon: GENERIC ANESTHESIA PROVIDER;  [facility]    TRANSESOPHAGEAL ECHOCARDIOGRAM INTRAOPERATIVE N/A 7/7/2023    Procedure: Transesophageal echocardiogram in the OR;  Surgeon: GENERIC ANESTHESIA PROVIDER;  [facility]    TRANSESOPHAGEAL ECHOCARDIOGRAM INTRAOPERATIVE N/A 11/17/2023    Procedure: ECHOCARDIOGRAM,TRANSESOPHAGEAL,WITH ANESTHESIA;  Surgeon: GENERIC ANESTHESIA PROVIDER;  [facility]    ZZC GASTRIC BYPASS,OBESE<100CM SHAYLEE-EN-Y  2002    lost 300 pounds         Allergies:      Allergies   Allergen Reactions    Bactrim [Sulfamethoxazole-Trimethoprim] Rash    Penicillins  Anaphylaxis     Please see Antimicrobial Management Team allergy assessment note 10/10/2018. Patient reported tolerating amoxicillin.  Tolerating cefepime and ceftriaxone without reaction 6/23    Ertapenem Nausea and Vomiting    Doxycycline Rash    Vancomycin Rash     Rash after receiving vancomycin 3/28/16 (infusion reaction?). Tolerated with slower infusion and diphenhydramine premed.  Tolerated 1250mg over 90minutes 7/2023.            Current Antimicrobials:     Ceftriaxone 9/4- present       Family History:     Family History   Problem Relation Age of Onset    Gastrointestinal Disease Mother         Crohns disease    Anxiety Disorder Mother     Thyroid Disease Mother         Grave's disease    Cancer Father         ear cancer-skin cancer/melanoma    Breast Cancer Maternal Grandmother     Macular Degeneration Maternal Grandfather     Anxiety Disorder Sister     Diabetes Maternal Uncle     Breast Cancer Other     Hypertension No family hx of     Hyperlipidemia No family hx of     Cerebrovascular Disease No family hx of     Prostate Cancer No family hx of     Depression No family hx of     Anesthesia Reaction No family hx of     Asthma No family hx of     Osteoporosis No family hx of     Genetic Disorder No family hx of     Obesity No family hx of     Mental Illness No family hx of     Substance Abuse No family hx of     Glaucoma No family hx of             Social History:     Social History     Socioeconomic History    Marital status:      Spouse name: Rose    Number of children: 1    Years of education: Not on file    Highest education level: GED or equivalent   Occupational History    Not on file   Tobacco Use    Smoking status: Light Smoker     Current packs/day: 0.50     Average packs/day: 0.5 packs/day for 3.0 years (1.5 ttl pk-yrs)     Types: Cigarettes    Smokeless tobacco: Never    Tobacco comments:     8/8/2024  smokes 3-5 cigarettes/day   Vaping Use    Vaping status: Never Used   Substance and  Sexual Activity    Alcohol use: No     Comment: quit 2002    Drug use: No    Sexual activity: Yes     Partners: Female     Birth control/protection: None     Comment: no protection   Other Topics Concern    Parent/sibling w/ CABG, MI or angioplasty before 65F 55M? No   Social History Narrative    Not on file     Social Determinants of Health     Financial Resource Strain: Low Risk  (9/4/2024)    Financial Resource Strain     Within the past 12 months, have you or your family members you live with been unable to get utilities (heat, electricity) when it was really needed?: No   Food Insecurity: Low Risk  (9/4/2024)    Food Insecurity     Within the past 12 months, did you worry that your food would run out before you got money to buy more?: No     Within the past 12 months, did the food you bought just not last and you didn t have money to get more?: No   Transportation Needs: Low Risk  (9/4/2024)    Transportation Needs     Within the past 12 months, has lack of transportation kept you from medical appointments, getting your medicines, non-medical meetings or appointments, work, or from getting things that you need?: No   Physical Activity: Unknown (8/4/2020)    Exercise Vital Sign     Days of Exercise per Week: 2 days     Minutes of Exercise per Session: Not on file   Stress: No Stress Concern Present (8/4/2020)    [employer] [place] [employer] - Occupational Stress Questionnaire     Feeling of Stress : Only a little   Social Connections: Unknown (1/1/2022)    Received from [facility] [employer] [facility], [facility]    Social Connections     Frequency of Communication with Friends and Family: Not on file   Interpersonal Safety: Low Risk  (9/4/2024)    Interpersonal Safety     Do you feel physically and emotionally safe where you currently live?: Yes     Within the past 12 months, have you been hit, slapped, kicked or otherwise physically hurt by someone?:  No     Within the past 12 months, have you been humiliated or emotionally abused in other ways by your partner or ex-partner?: No   Housing Stability: Low Risk  (9/4/2024)    Housing Stability     Do you have housing? : Yes     Are you worried about losing your housing?: No              Physical Exam:   Ranges forvital signs:  Temp:  [98.3  F (36.8  C)-99  F (37.2  C)] 98.8  F (37.1  C)  Pulse:  [52-83] 52  Resp:  [16-18] 18  BP: (133-143)/(91-95) 142/95  SpO2:  [98 %-99 %] 99 %  No intake or output data in the 24 hours ending 09/04/24 1427    Exam:  GENERAL:  well-developed, well-nourished, sitting in bed in no acute distress. He is joking a lot. Unusual affect.  ENT:  Head is normocephalic, atraumatic. Oropharynx is moist without exudates or ulcers.  EYES:  Eyes have anicteric sclerae.    NECK:  Supple.  LUNGS:  Clear to auscultation.  CARDIOVASCULAR:  Regular rate and rhythm with no murmurs, gallops or rubs.  ABDOMEN:  Normal bowel sounds, soft, nontender.  EXT: Extremities warm and without edema.  SKIN:  No acute rashes.  Line is in place without any surrounding erythema. No tenderness to palpation on azevedo catheter.  NEUROLOGIC:  Grossly nonfocal.         Laboratory Data:   Reviewed.  Pertinent for:    WBC 6.7  LA 0.5    Culture data:    9/4/24   Blood culture ngtd    BLOOD CULTURE 8/31/24  Specimen: Blood - Central Line  Component 3 d ago   CULTURE RESULT Panic    CULTURE Aerobic and Anaerobic Bottles growing Escherichia coli   Resulting Agency [facility]   Susceptibility    Organism Antibiotic Susceptibility   Escherichia coli TRIMETHOPRIM/SULF <=1/19: S   Escherichia coli AMPICILLIN 4: S   Escherichia coli CEFAZOLIN 2: S   Escherichia coli GENTAMICIN <=1: S   Escherichia coli CEFTRIAXONE <=0.25: S   Escherichia coli CEFTAZIDIME <=0.5: S   Escherichia coli LEVOFLOXACIN <=0.12: S   Escherichia coli CIPROFLOXACIN <=0.06: S   Escherichia coli PIPERACILLIN/TAZO <=4: S    Escherichia coli AMPICILLIN/SULBACTAM <=2: S   Escherichia coli CEFEPIME <=0.12: S   Escherichia coli MEROPENEM <=0.25: S            Imagin/12/24 IR note showing placement of catheter  Completed image-guided placement of 5 Arabic, 33 cm single lumen tunneled central venous catheter via left internal jugular/innominate confluence. Aspirates and flushes freely, heparin locked and ready for immediate use. Tip in high right atrium.

## 2024-09-04 NOTE — ED PROVIDER NOTES
[place] EMERGENC[facility] DEPARTMENT ([facility])    9/03/24       History     Chief Complaint   Patient presents with    Abnormal Labs     HPI  Parker Acevedo is a 53 year old male who with PMH of hyperlipidemia, malnutrition, peptic ulcer disease, hernia, vitamin B12 deficiency and history of Celestino-en-Y gastric bypass presents to the ED due to positive blood culture. Patient reports fever every time he puts his TPN in. Patient states his fevers go up to 103 F and it takes about 4-5 hours to get down to 99 F. Patient reports this has been happening for the past 2 weeks. Patient reports fatigue, drowsiness, and nausea. Patient states he had this tunnel line in his chest placed 1 month ago. Patient denies swelling or pain around his tunnel line, vomiting, diarrhea, dysuria, and constipation. Denies previous infection and use of antibiotics.     As per triage note, patient arrives ambulatory to triage after receiving a call to come in for a positive blood culture from his azevedo line.      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    Complication of anesthesia     Difficulty swallowing     Gastric ulcer, unspecified as acute or chronic, without mention of hemorrhage, perforation, or obstruction     Gastro-oesophageal reflux disease     Head injury     Hiatal hernia     Other bladder disorder     Other chronic pain     PONV (postoperative nausea and vomiting)     Severe malnutrition (H24)     TPN    Short gut syndrome     Tobacco abuse      Past Surgical History:   Procedure Laterality Date    AMPUTATION      APPENDECTOMY      BACK SURGERY  11/3/2014    curve in the spine    BIOPSY LYMPH NODE CERVICAL N/A 2/20/2015    Procedure:  BIOPSY LYMPH NODE CERVICAL;  Surgeon: Baron Scanlon MD;  Location: [facility]    CHOLECYSTECTOMY      COLONOSCOPY N/A 7/14/2021    Procedure: COLONOSCOPY;  Surgeon: Jimbo Estrada MD;  Location: [facility] OR    COLONOSCOPY N/A 4/13/2022    Procedure: COLONOSCOPY;  Surgeon: Jimbo Estrada MD;  Location: [facility] OR    COLONOSCOPY N/A 9/19/2023    Procedure: Colonoscopy;  Surgeon: Jimbo Estrada MD;  Location: [facility] OR    DISCECTOMY, FUSION CERVICAL ANTERIOR ONE LEVEL, COMBINED N/A 2/15/2017    Procedure: COMBINED DISCECTOMY, FUSION CERVICAL ANTERIOR ONE LEVEL;  Surgeon: Darren Campos MD;  [facility]    ENDOSCOPIC INSERTION TUBE GASTROSTOMY  9/9/2013    Procedure: ENDOSCOPIC INSERTION TUBE GASTROSTOMY;;  Surgeon: Francis Vyas MD;  [facility]    ENDOSCOPIC ULTRASOUND UPPER GASTROINTESTINAL TRACT (GI)  4/29/2011    Procedure:ENDOSCOPIC ULTRASOUND UPPER GASTROINTESTINAL TRACT (GI); Both Procedures done Conjointly; Surgeon:NEREIDA HOUSER; [facility]    ENDOSCOPIC ULTRASOUND UPPER GASTROINTESTINAL TRACT (GI)  9/9/2013    Procedure: ENDOSCOPIC ULTRASOUND UPPER GASTROINTESTINAL TRACT (GI);  Endoscopic Ultrasound Guide Gastrostomy Tube Placement  C-arm;  Surgeon: Noe Lizarraga MD;  [facility]    ENDOSCOPIC ULTRASOUND UPPER GASTROINTESTINAL TRACT (GI) N/A 2/24/2021    Procedure: ENDOSCOPIC ULTRASOUND, ESOPHAGOSCOPY / UPPER GASTROINTESTINAL TRACT (GI), esophagastrogastroduodenoscopy;  Surgeon: Berny Bach MD;  [facility]    ENDOSCOPY  03/25/11    EGD, [facility]    ENDOSCOPY  08/04/09    Upper Endoscopy, [facility]    ENDOSCOPY  01/05/09    Upper Endoscopy, [facility]    ESOPHAGOSCOPY, GASTROSCOPY, DUODENOSCOPY (EGD), COMBINED  4/20/2011    Procedure:COMBINED ESOPHAGOSCOPY, GASTROSCOPY, DUODENOSCOPY (EGD); Surgeon:FRANCIS VYAS; [facility]    ESOPHAGOSCOPY, GASTROSCOPY, DUODENOSCOPY (EGD), COMBINED  6/15/2011     Procedure:COMBINED ESOPHAGOSCOPY, GASTROSCOPY, DUODENOSCOPY (EGD); Surgeon:FRANCSI VYAS; [facility]    ESOPHAGOSCOPY, GASTROSCOPY, DUODENOSCOPY (EGD), COMBINED  6/12/2013    Procedure: COMBINED ESOPHAGOSCOPY, GASTROSCOPY, DUODENOSCOPY (EGD);;  Surgeon: Francis Vyas MD;  [facility]    ESOPHAGOSCOPY, GASTROSCOPY, DUODENOSCOPY (EGD), COMBINED  11/22/2013    Procedure: COMBINED ESOPHAGOSCOPY, GASTROSCOPY, DUODENOSCOPY (EGD);;  Surgeon: Francis Vyas MD;  [facility]    ESOPHAGOSCOPY, GASTROSCOPY, DUODENOSCOPY (EGD), COMBINED  4/30/2014    Procedure: COMBINED ESOPHAGOSCOPY, GASTROSCOPY, DUODENOSCOPY (EGD);  Surgeon: Francis Vyas MD;  [facility]    ESOPHAGOSCOPY, GASTROSCOPY, DUODENOSCOPY (EGD), COMBINED N/A 2/20/2015    Procedure: COMBINED ESOPHAGOSCOPY, GASTROSCOPY, DUODENOSCOPY (EGD), BIOPSY SINGLE OR MULTIPLE;  Surgeon: Baron Scanlon MD;  Location:  OR    ESOPHAGOSCOPY, GASTROSCOPY, DUODENOSCOPY (EGD), COMBINED N/A 9/30/2015    Procedure: COMBINED ESOPHAGOSCOPY, GASTROSCOPY, DUODENOSCOPY (EGD);  Surgeon: Francis Vyas MD;  [facility]    ESOPHAGOSCOPY, GASTROSCOPY, DUODENOSCOPY (EGD), COMBINED N/A 10/3/2019    Procedure: Upper Endoscopy;  Surgeon: Clif Morrow MD;  [facility]    GASTRECTOMY  6/22/2012    Procedure: GASTRECTOMY;  Open Approach, Excise Ulcers,Partial Gastrectomy, Esophagojejunostomy, Hiatal Hernia Repair, Extensive Lysis of Adhesions and Esaphagogastrodudenoscopy.;  Surgeon: Francis Vyas MD;  [facility]    GASTROJEJUNOSTOMY  08/26/09    Extensice enterolysis, partial resect. jejunum, part. resect gastric pouch, gastrojejunostomy anastomosis    HC ESOPH/GAS REFLUX TEST W NASAL IMPED ELECTRODE  8/5/2013    Procedure: ESOPHAGEAL IMPEDENCE FUNCTION TEST 1 HOUR OR LESS;  Surgeon: Halie Lang MD;  Location:  GI    HEAD & NECK SURGERY  2/15/2017    C5-C6    HERNIA REPAIR  2006    Umbilical hernia     HERNIORRHAPHY HIATAL  6/22/2012    Procedure: HERNIORRHAPHY HIATAL;;  Surgeon: Francis Vyas MD;  [facility]    HERNIORRHAPHY INGUINAL  11/22/2013    Procedure: HERNIORRHAPHY INGUINAL;;  Surgeon: Francis Vyas MD;  [facility]    INSERT PICC LINE Right 12/19/2019    Procedure: Picc Placement;  Surgeon: Per Dumont PA-C;  [facility]    INSERT PICC LINE Right 2/21/2020    Procedure: INSERTION, PICC;  Surgeon: Per Dumont PA-C;  [facility]    INSERT PORT VASCULAR ACCESS Right 12/19/2017    Procedure: INSERT PORT VASCULAR ACCESS;  Right Chest Port Placement ;  Surgeon: Lisandro Alejandro PA-C;  [facility]    INSERT PORT VASCULAR ACCESS Right 8/2/2018    Procedure: INSERT PORT VASCULAR ACCESS;  Place single lumen tunneled central venous access catheter;  Surgeon: Guy Jamil PA-C;  [facility]    IR CVC TUNNEL PLACEMENT > 5 YRS OF AGE  8/7/2019    IR CVC TUNNEL PLACEMENT > 5 YRS OF AGE  4/14/2020    IR CVC TUNNEL PLACEMENT > 5 YRS OF AGE  8/3/2020    IR CVC TUNNEL PLACEMENT > 5 YRS OF AGE  9/4/2020    IR CVC TUNNEL PLACEMENT > 5 YRS OF AGE  2/5/2021    IR CVC TUNNEL PLACEMENT > 5 YRS OF AGE  3/23/2021    IR CVC TUNNEL PLACEMENT > 5 YRS OF AGE  1/13/2022    IR CVC TUNNEL PLACEMENT > 5 YRS OF AGE  5/12/2022    IR CVC TUNNEL PLACEMENT > 5 YRS OF AGE  7/13/2022    IR CVC TUNNEL PLACEMENT > 5 YRS OF AGE  7/10/2023    IR CVC TUNNEL PLACEMENT > 5 YRS OF AGE  11/17/2023    IR CVC TUNNEL PLACEMENT > 5 YRS OF AGE  1/25/2024    IR CVC TUNNEL PLACEMENT > 5 YRS OF AGE  6/19/2024    IR CVC TUNNEL REMOVAL LEFT  6/7/2023    IR CVC TUNNEL REMOVAL LEFT  11/14/2023    IR CVC TUNNEL REMOVAL LEFT  1/22/2024    IR CVC TUNNEL REMOVAL RIGHT  10/1/2019    IR CVC TUNNEL REMOVAL RIGHT  7/30/2020    IR CVC TUNNEL REMOVAL RIGHT  9/2/2020    IR CVC TUNNEL REMOVAL RIGHT  2/3/2021    IR CVC TUNNEL REMOVAL RIGHT  3/19/2021    IR CVC TUNNEL REMOVAL RIGHT  1/10/2022    IR CVC  TUNNEL REMOVAL RIGHT  5/9/2022    IR CVC TUNNEL REMOVAL RIGHT  7/8/2022    IR CVC TUNNEL REMOVAL RIGHT  6/17/2024    IR CVC TUNNEL REVISION LEFT  8/10/2022    IR CVC TUNNEL REVISION LEFT  9/19/2023    IR CVC TUNNEL REVISION LEFT  8/12/2024    IR CVC TUNNEL REVISION RIGHT  5/7/2021    IR FOLLOW UP VISIT OUTPATIENT  8/7/2019    IR PICC EXCHANGE LEFT  6/8/2023    IR PICC PLACEMENT > 5 YRS OF AGE  3/7/2019    IR PICC PLACEMENT > 5 YRS OF AGE  12/19/2019    IR PICC PLACEMENT > 5 YRS OF AGE  2/21/2020    IR PICC PLACEMENT > 5 YRS OF AGE  6/7/2023    LAPAROTOMY EXPLORATORY  11/22/2013    Procedure: LAPAROTOMY EXPLORATORY;  Exploratory Laparotomy, Upper Endoscopy, Left Inguinal Hernia Repair;  Surgeon: Francis Vyas MD;  [facility]    LAPAROTOMY EXPLORATORY N/A 9/30/2023    Procedure: Laparotomy exploratory, reduction of intussusception, resection of small bowel with primary anastamosis, upper endoscopy;  Surgeon: Delvis Mercado MD;  [facility]    ORTHOPEDIC SURGERY      PICC INSERTION Right 03/16/2017    5fr DL BioFlo PICC, 42cm (3cm external) in the R medial brachial vein w/ tip in the SVC RA junction.    PICC INSERTION Left 09/23/2017    5fr DL BioFlo PICC, 45cm (1cm external) in the L basilic vein w/ tip in the SVC RA junction.    PICC INSERTION Right 05/16/2019    5Fr - 43cm, Medial brachial vein, low SVC    PICC INSERTION Right 10/02/2019    5Fr - 43cm (2cm external), basilic vein, low SVC    SHAYLEE EN Y BOWEL  2003    SOFT TISSUE SURGERY      THORACIC SURGERY      TONSILLECTOMY      TRANSESOPHAGEAL ECHOCARDIOGRAM INTRAOPERATIVE N/A 1/8/2019    Procedure: TRANSESOPHAGEAL ECHOCARDIOGRAM INTRAOPERATIVE;  Surgeon: GENERIC ANESTHESIA PROVIDER;  [facility]    TRANSESOPHAGEAL ECHOCARDIOGRAM INTRAOPERATIVE N/A 7/7/2023    Procedure: Transesophageal echocardiogram in the OR;  Surgeon: GENERIC ANESTHESIA PROVIDER;  [facility]    TRANSESOPHAGEAL ECHOCARDIOGRAM INTRAOPERATIVE N/A 11/17/2023     "Procedure: ECHOCARDIOGRAM,TRANSESOPHAGEAL,WITH ANESTHESIA;  Surgeon: GENERIC ANESTHESIA PROVIDER;  Location:  OR    [facility] GASTRIC BYPASS,OBESE<100CM SHAYLEE-EN-Y  2002    lost 300 pounds     albuterol (VENTOLIN HFA) 108 (90 Base) MCG/ACT inhaler  ALPRAZolam (XANAX) 0.5 MG tablet  amphetamine-dextroamphetamine (ADDERALL) 20 MG tablet  carvedilol (COREG) 6.25 MG tablet  cyanocobalamin (CYANOCOBALAMIN) 1000 MCG/ML injection  enoxaparin ANTICOAGULANT (LOVENOX) 80 MG/0.8ML syringe  [facility] INFUSION MANAGED PATIENT  fentaNYL (DURAGESIC) 25 mcg/hr 72 hr patch  Lidocaine (LIDOCARE) 4 % Patch  lipids plant base (CLINOLIPID) 20 % infusion  naloxone (NARCAN) 4 MG/0.1ML nasal spray  nystatin (MYCOSTATIN) 591443 UNIT/GM external cream  ondansetron (ZOFRAN ODT) 8 MG ODT tab  oxyCODONE (ROXICODONE) 5 MG/5ML solution  polyethylene glycol (MIRALAX) 17 GM/Dose powder  pregabalin (LYRICA) 25 MG capsule  sucralfate (CARAFATE) 1 GM/10ML suspension  Syringe/Needle, Disp, (B-D ECLIPSE SYRINGE) 27G X 1/2\" 1 ML MISC  vitamin D2 (ERGOCALCIFEROL) 53887 units (1250 mcg) capsule      Allergies   Allergen Reactions    Bactrim [Sulfamethoxazole-Trimethoprim] Rash    Penicillins Anaphylaxis     Please see Antimicrobial Management Team allergy assessment note 10/10/2018. Patient reported tolerating amoxicillin.  Tolerating cefepime and ceftriaxone without reaction 6/23    Ertapenem Nausea and Vomiting    Doxycycline Rash    Vancomycin Rash     Rash after receiving vancomycin 3/28/16 (infusion reaction?). Tolerated with slower infusion and diphenhydramine premed.  Tolerated 1250mg over 90minutes 7/2023.     Family History  Family History   Problem Relation Age of Onset    Gastrointestinal Disease Mother         Crohns disease    Anxiety Disorder Mother     Thyroid Disease Mother         Grave's disease    Cancer Father         ear cancer-skin cancer/melanoma    Breast Cancer Maternal Grandmother     Macular Degeneration Maternal Grandfather     " Anxiety Disorder Sister     Diabetes Maternal Uncle     Breast Cancer Other     Hypertension No family hx of     Hyperlipidemia No family hx of     Cerebrovascular Disease No family hx of     Prostate Cancer No family hx of     Depression No family hx of     Anesthesia Reaction No family hx of     Asthma No family hx of     Osteoporosis No family hx of     Genetic Disorder No family hx of     Obesity No family hx of     Mental Illness No family hx of     Substance Abuse No family hx of     Glaucoma No family hx of      Social History   Social History     Tobacco Use    Smoking status: Light Smoker     Current packs/day: 0.50     Average packs/day: 0.5 packs/day for 3.0 years (1.5 ttl pk-yrs)     Types: Cigarettes    Smokeless tobacco: Never    Tobacco comments:     8/8/2024  smokes 3-5 cigarettes/day   Vaping Use    Vaping status: Never Used   Substance Use Topics    Alcohol use: No     Comment: quit 2002    Drug use: No      A complete review of systems was performed with pertinent positives and negatives noted in the HPI, and all other systems negative.    Physical Exam   BP: (!) 133/91  Pulse: 83  Temp: 99  F (37.2  C)  Resp: 16  SpO2: 98 %  Physical Exam  Vitals and nursing note reviewed.   Constitutional:       General: He is not in acute distress.     Appearance: Normal appearance. He is well-developed. He is not ill-appearing, toxic-appearing or diaphoretic.   HENT:      Head: Normocephalic and atraumatic.      Nose: Nose normal.      Mouth/Throat:      Mouth: Mucous membranes are moist.   Eyes:      General: No scleral icterus.     Conjunctiva/sclera: Conjunctivae normal.   Cardiovascular:      Rate and Rhythm: Normal rate.      Heart sounds: Normal heart sounds.   Pulmonary:      Effort: Pulmonary effort is normal. No respiratory distress.      Breath sounds: Normal breath sounds. No stridor.   Abdominal:      General: There is no distension.      Palpations: Abdomen is soft.      Tenderness: There is no  abdominal tenderness.   Musculoskeletal:         General: No deformity or signs of injury. Normal range of motion.      Cervical back: Normal range of motion and neck supple. No rigidity.   Skin:     General: Skin is warm and dry.      Coloration: Skin is not jaundiced or pale.      Findings: No rash.   Neurological:      General: No focal deficit present.      Mental Status: He is alert and oriented to person, place, and time.   Psychiatric:         Mood and Affect: Mood normal.         Behavior: Behavior normal.         Thought Content: Thought content normal.           ED Course, Procedures, & Data      Procedures                 Results for orders placed or performed during the hospital encounter of 09/03/24   Comprehensive metabolic panel     Status: Abnormal   Result Value Ref Range    Sodium 141 135 - 145 mmol/L    Potassium 3.4 3.4 - 5.3 mmol/L    Carbon Dioxide (CO2) 25 22 - 29 mmol/L    Anion Gap 10 7 - 15 mmol/L    Urea Nitrogen 11.9 6.0 - 20.0 mg/dL    Creatinine 0.82 0.67 - 1.17 mg/dL    GFR Estimate >90 >60 mL/min/1.73m2    Calcium 8.6 (L) 8.8 - 10.4 mg/dL    Chloride 106 98 - 107 mmol/L    Glucose 90 70 - 99 mg/dL    Alkaline Phosphatase 100 40 - 150 U/L    AST 18 0 - 45 U/L    ALT 14 0 - 70 U/L    Protein Total 7.2 6.4 - 8.3 g/dL    Albumin 3.9 3.5 - 5.2 g/dL    Bilirubin Total 0.3 <=1.2 mg/dL   Lactic acid whole blood with 1x repeat in 2 hr when >2     Status: Abnormal   Result Value Ref Range    Lactic Acid, Initial 0.5 (L) 0.7 - 2.0 mmol/L   CBC with platelets and differential     Status: Abnormal   Result Value Ref Range    WBC Count 6.6 4.0 - 11.0 10e3/uL    RBC Count 3.89 (L) 4.40 - 5.90 10e6/uL    Hemoglobin 11.4 (L) 13.3 - 17.7 g/dL    Hematocrit 36.6 (L) 40.0 - 53.0 %    MCV 94 78 - 100 fL    MCH 29.3 26.5 - 33.0 pg    MCHC 31.1 (L) 31.5 - 36.5 g/dL    RDW 17.3 (H) 10.0 - 15.0 %    Platelet Count 248 150 - 450 10e3/uL    % Neutrophils 51 %    % Lymphocytes 31 %    % Monocytes 14 %    %  Eosinophils 3 %    % Basophils 1 %    % Immature Granulocytes 0 %    NRBCs per 100 WBC 0 <1 /100    Absolute Neutrophils 3.4 1.6 - 8.3 10e3/uL    Absolute Lymphocytes 2.1 0.8 - 5.3 10e3/uL    Absolute Monocytes 0.9 0.0 - 1.3 10e3/uL    Absolute Eosinophils 0.2 0.0 - 0.7 10e3/uL    Absolute Basophils 0.1 0.0 - 0.2 10e3/uL    Absolute Immature Granulocytes 0.0 <=0.4 10e3/uL    Absolute NRBCs 0.0 10e3/uL   CBC with platelets differential     Status: Abnormal    Narrative    The following orders were created for panel order CBC with platelets differential.  Procedure                               Abnormality         Status                     ---------                               -----------         ------                     CBC with platelets and d...[[id number]]  Abnormal            Final result                 Please view results for these tests on the individual orders.     Medications   cefTRIAXone (ROCEPHIN) 2 g vial to attach to  ml bag for ADULTS or NS 50 ml bag for PEDS (2 g Intravenous $New Bag 9/4/24 0006)   sodium chloride 0.9% BOLUS 1,000 mL (1,000 mLs Intravenous $New Bag 9/4/24 0002)     Labs Ordered and Resulted from Time of ED Arrival to Time of ED Departure   COMPREHENSIVE METABOLIC PANEL - Abnormal       Result Value    Sodium 141      Potassium 3.4      Carbon Dioxide (CO2) 25      Anion Gap 10      Urea Nitrogen 11.9      Creatinine 0.82      GFR Estimate >90      Calcium 8.6 (*)     Chloride 106      Glucose 90      Alkaline Phosphatase 100      AST 18      ALT 14      Protein Total 7.2      Albumin 3.9      Bilirubin Total 0.3     LACTIC ACID WHOLE BLOOD WITH 1X REPEAT IN 2 HR WHEN >2 - Abnormal    Lactic Acid, Initial 0.5 (*)    CBC WITH PLATELETS AND DIFFERENTIAL - Abnormal    WBC Count 6.6      RBC Count 3.89 (*)     Hemoglobin 11.4 (*)     Hematocrit 36.6 (*)     MCV 94      MCH 29.3      MCHC 31.1 (*)     RDW 17.3 (*)     Platelet Count 248      % Neutrophils 51      % Lymphocytes 31       % Monocytes 14      % Eosinophils 3      % Basophils 1      % Immature Granulocytes 0      NRBCs per 100 WBC 0      Absolute Neutrophils 3.4      Absolute Lymphocytes 2.1      Absolute Monocytes 0.9      Absolute Eosinophils 0.2      Absolute Basophils 0.1      Absolute Immature Granulocytes 0.0      Absolute NRBCs 0.0     BLOOD CULTURE     No orders to display          Critical care was not performed.     Medical Decision Making  The patient's presentation was of high complexity (an acute health issue posing potential threat to life or bodily function).    The patient's evaluation involved:  review of external note(s) from 2 sources (see separate area of note for details)  review of 3+ test result(s) ordered prior to this encounter (see separate area of note for details)  ordering and/or review of 3+ test(s) in this encounter (see separate area of note for details)  discussion of management or test interpretation with another health professional (see separate area of note for details)    The patient's management necessitated moderate risk (prescription drug management including medications given in the ED), high risk (a parenteral controlled substance), high risk (drug therapy requiring intensive monitoring (see separate area of note for details)), and high risk (a decision regarding hospitalization).    Assessment & Plan    Parker Acevedo is a 53 year old male who with PMH of hyperlipidemia, malnutrition, peptic ulcer disease, hernia, vitamin B12 deficiency and history of Celestino-en-Y gastric bypass presents to the ED due to positive blood culture.     Ddx: sepsis, line infection    Discussed with pharmacy.  Patient's cultures grew E. coli without any resistance genes noted on the multiplex PCR.  Should be adequately covered with ceftriaxone.  Will repeat cultures.  Patient admitted to medicine for ongoing management.    I have reviewed the nursing notes. I have reviewed the findings, diagnosis, plan and need  for follow up with the patient.    New Prescriptions    No medications on file       Final diagnoses:   Bacteremia       I, James Olsen, am serving as a trained medical scribe to document services personally performed by Windy Bee MD based on the provider's statements to me on September 3, 2024.  This document has been checked and approved by the attending provider.    Windy NJ MD, was physically present and have reviewed and verified the accuracy of this note documented by James Olsen, medical scribe.      Windy Bee MD  [facility] EMERGENCY DEPARTMENT  9/3/2024     Windy Bee MD  09/10/24 0022

## 2024-09-04 NOTE — ED TRIAGE NOTES
Pt arrives ambulatory to triage after receiving a call to come in for a positive blood culture from his azevedo line.

## 2024-09-04 NOTE — PROGRESS NOTES
CLINICAL NUTRITION SERVICES - ASSESSMENT NOTE     Nutrition Prescription    RECOMMENDATIONS FOR MDs/PROVIDERS TO ORDER:  Monitor lytes/fluids and replace PRN until PN initiated and then adjust accordingly    Malnutrition Status:    Unable to determine due to unable to complete NFPE and history, will follow up    Recommendations already ordered by Registered Dietitian (RD):  PO intake as tolerated. Snacks/supplements PRN  If clinimix desired:  -Recommend only using Peripheral PN for short-term nutrition support, ~1-2 week duration  -If anticipated that patient will need PN support for >2 weeks, consider central line placement.     Dosing weight: 90.8 kg  Access: peripheral line     Initial parameters (per day)  ClinimixE 4.25% AA, 5% dextrose concentration (peripheral line)    Volume: Rate of 83 mL/hr (1992 mL/day)  Dextrose: 100 g (amount per Clinimix at 83 mL/hr)  AA: 85 g (amount per Clinimix at 83 mL/hr)  Lipids: pt decline    Dextrose titration:   None needed with peripheral Clinimix     Additives: Standard Infuvite and Trace minerals + 100 mg B1/Thiamine x7 days      - PPN will provide: 1992 mL volume, 100g Dex daily (340 kcal, GIR .76 mg/kg/min), 85 g AA daily (.94 gm/kg/day, 340 kcal) for total provision of 680 Kcals (7Kcal/kg/day).    If custom PPN desired:  Dosing weight:  90.8 kg  Access: peripheral    Initial parameters (per day)  Volume:  2000 mL  Dextrose: 125 g  AA: 110 g  Lipids: pt declined    Additives: Standard Infuvite and Trace minerals + 100 mg B1/Thiamine x7 days   or per PharmD    Goal PN provides 125 g dextrose, 95 g AA for total provision of 805 Kcals (9 Kcals/kg), 1 g/kg protein, GIR .96 mg/kg/minute.      Future/Additional Recommendations:  Monitor nutrition related findings and follow up per protocol  Obtain home TPN regimen as able/NFPE and subjective history    If unable to obtain home TPN,  Dosing weight:  90.8 kg  Access: central    Initial parameters (per day)  Volume:  1980  "mL  Dextrose: 125 g  AA: 140 g  Lipids: 250 mL SMOFlipids 20%, 7 days per week     Dextrose titration:   Monitor lytes and if within acceptable parameters (Mg++ > or = 1.5, K+ is > or = 3, and PO4 > or = 1.9), increase dextrose by 50 g/day to goal of 225 g dextrose.    Additives: Standard Infuvite and Trace minerals    Goal PN provides 225 g dextrose, 140 g AA, and 250 mL 20% lipids 7 days per week for total provision of 1825 Kcals (20 Kcals/kg), 1.5 g/kg protein, GIR 1.7 mg/kg/minute, and 27% fat kcals on average daily.        REASON FOR ASSESSMENT  Parker Acevedo is a/an 53 year old male assessed by the dietitian for Pharmacy/Nutrition to Start and Manage PN    Patient admitted for positive blood culture at clinic.     MEDICAL HISTORY  Celestino-en-Y gastric bypass, malnutrition, chronic TPN, chronic pain, paroxysmal afib, anemia, ADHD and DVT   many prior admissions for CLABSI, last admitted 6/14/24 to 6/19/24   J-tube for venting. He attempted tube feedings years ago but was unable to absorb it due to short gut syndrome. He is now maintenance on daily TPN and 3 times a week lipids. He eats about 300 calories daily   NUTRITION HISTORY  Called [facility] x2, left voicemails. Awaiting response. Attempted visit with pt. Per RN, pt busy with other disciplines at time of visit. Pt now off unit. Team desires PPN at this time for line holiday. Per PharmD, pt would be willing to start PPN tomorrow without lipids. Discussed Clinimix vs custom PPN with PharmD.    CURRENT NUTRITION ORDERS  Diet: Regular    Intake/Tolerance: No documented intakes to assess.    LABS  Lactic acid .5  Tg 190    MEDICATIONS  Vit B12  Vit D2    ANTHROPOMETRICS  Height: 5'10\"  Most Recent Weight:      IBW: 75.5 kg  There is no height or weight on file to calculate BMI. BMI Category: Normal BMI, based on most recent weight  Weight History: No significant weight loss noted.  Wt Readings from Last 20 Encounters:   08/12/24 90.8 kg (200 lb 3.2 oz) "   08/08/24 90.9 kg (200 lb 6.4 oz)   06/14/24 87.2 kg (192 lb 4.8 oz)   06/03/24 86.6 kg (191 lb)   02/05/24 81.6 kg (180 lb)   02/05/24 81.6 kg (180 lb)   01/18/24 81.6 kg (180 lb)   11/17/23 85.7 kg (189 lb)   11/11/23 82.1 kg (181 lb)   10/19/23 89.7 kg (197 lb 11.2 oz)   09/29/23 88.5 kg (195 lb)   09/19/23 89.8 kg (198 lb)   09/18/23 90.3 kg (199 lb)   09/05/23 89.8 kg (198 lb)   08/06/23 85 kg (187 lb 6.3 oz)   08/04/23 86.6 kg (191 lb)   07/11/23 86.8 kg (191 lb 6.4 oz)   06/07/23 87.7 kg (193 lb 6.4 oz)   06/04/23 87.1 kg (192 lb)   06/02/23 79 kg (174 lb 1.6 oz)         ASSESSED NUTRITION NEEDS  Dosing Weight: 90.8 kg ( most recent weight )   Estimated Energy Needs: 1237-1281 kcals/day (20 - 25 kcals/kg)  Justification: Maintenance  Estimated Protein Needs: 136-182 grams protein/day (1.5 - 2 grams of pro/kg)  Justification: Increased needs  Estimated Fluid Needs: (1 mL/kcal)   Justification: Maintenance    PHYSICAL FINDINGS  See malnutrition section below.       Per EMR:      MALNUTRITION  % Intake: Decreased intake does not meet criteria  % Weight Loss: None noted  Subcutaneous Fat Loss: Unable to assess   Muscle Loss: Unable to assess  Fluid Accumulation/Edema: None noted  Malnutrition Diagnosis: Unable to determine due to unable to complete NFPE and history, will follow up    NUTRITION DIAGNOSIS  Altered GI function related to Celestino-en-Y gastric bypass, short gut, malnutrition as evidenced by reliance on TPN to help meet nutritional needs.      INTERVENTIONS  Implementation  Nutrition Education: will be provided if nutrition education needs arise.    Collaboration with other providers  Parenteral Nutrition/IV Fluids - provide recs      Goals  Total avg nutritional intake to meet a minimum of 20 kcal/kg and 1.5 g PRO/kg daily (per dosing wt 90.8 kg).        Monitoring/Evaluation  Progress toward goals will be monitored and evaluated per protocol.  Holly Aldana MS, RD, LD, CNSC    6C (beds 7528-0885) + 7C  (beds 6134-0327) + ED   [facility] by name or unit dietitian

## 2024-09-04 NOTE — ED NOTES
Attempted to bring patient back to ED room 6 for admission. Patient declined to come back to a room and states I will wait in the lobby until a room is available upstairs. RN offered the patient a bed in the ED inpatient overflow unit to which the patient also declined. Dr. Melissa cosby.

## 2024-09-05 ENCOUNTER — APPOINTMENT (OUTPATIENT)
Dept: INTERVENTIONAL RADIOLOGY/VASCULAR | Facility: CLINIC | Age: 54
DRG: 315 | End: 2024-09-05
Payer: COMMERCIAL

## 2024-09-05 ENCOUNTER — TELEPHONE (OUTPATIENT)
Dept: INTERNAL MEDICINE | Facility: CLINIC | Age: 54
End: 2024-09-05

## 2024-09-05 PROCEDURE — 250N000011 HC RX IP 250 OP 636: Performed by: NURSE PRACTITIONER

## 2024-09-05 PROCEDURE — 120N000002 HC R&B MED SURG/OB UMMC

## 2024-09-05 PROCEDURE — 36589 REMOVAL TUNNELED CV CATH: CPT

## 2024-09-05 PROCEDURE — 87205 SMEAR GRAM STAIN: CPT | Performed by: STUDENT IN AN ORGANIZED HEALTH CARE EDUCATION/TRAINING PROGRAM

## 2024-09-05 PROCEDURE — 250N000009 HC RX 250: Performed by: STUDENT IN AN ORGANIZED HEALTH CARE EDUCATION/TRAINING PROGRAM

## 2024-09-05 PROCEDURE — 87070 CULTURE OTHR SPECIMN AEROBIC: CPT | Performed by: STUDENT IN AN ORGANIZED HEALTH CARE EDUCATION/TRAINING PROGRAM

## 2024-09-05 PROCEDURE — 250N000011 HC RX IP 250 OP 636: Performed by: EMERGENCY MEDICINE

## 2024-09-05 PROCEDURE — 87075 CULTR BACTERIA EXCEPT BLOOD: CPT | Performed by: STUDENT IN AN ORGANIZED HEALTH CARE EDUCATION/TRAINING PROGRAM

## 2024-09-05 PROCEDURE — 36589 REMOVAL TUNNELED CV CATH: CPT | Mod: LT

## 2024-09-05 PROCEDURE — 02PAX3Z REMOVAL OF INFUSION DEVICE FROM HEART, EXTERNAL APPROACH: ICD-10-PCS

## 2024-09-05 PROCEDURE — 99232 SBSQ HOSP IP/OBS MODERATE 35: CPT | Performed by: STUDENT IN AN ORGANIZED HEALTH CARE EDUCATION/TRAINING PROGRAM

## 2024-09-05 PROCEDURE — 250N000013 HC RX MED GY IP 250 OP 250 PS 637: Performed by: NURSE PRACTITIONER

## 2024-09-05 PROCEDURE — 99233 SBSQ HOSP IP/OBS HIGH 50: CPT | Mod: GC | Performed by: INTERNAL MEDICINE

## 2024-09-05 RX ADMIN — SUCRALFATE 1 G: 1 SUSPENSION ORAL at 15:00

## 2024-09-05 RX ADMIN — CARVEDILOL 12.5 MG: 12.5 TABLET, FILM COATED ORAL at 17:45

## 2024-09-05 RX ADMIN — OXYCODONE HYDROCHLORIDE 10 MG: 5 SOLUTION ORAL at 15:01

## 2024-09-05 RX ADMIN — CYANOCOBALAMIN 1000 MCG: 1000 INJECTION, SOLUTION INTRAMUSCULAR; SUBCUTANEOUS at 13:07

## 2024-09-05 RX ADMIN — ENOXAPARIN SODIUM 80 MG: 80 INJECTION SUBCUTANEOUS at 22:11

## 2024-09-05 RX ADMIN — MAGNESIUM SULFATE HEPTAHYDRATE: 500 INJECTION, SOLUTION INTRAMUSCULAR; INTRAVENOUS at 23:44

## 2024-09-05 RX ADMIN — ENOXAPARIN SODIUM 80 MG: 80 INJECTION SUBCUTANEOUS at 09:02

## 2024-09-05 RX ADMIN — ACETAMINOPHEN 650 MG: 325 TABLET ORAL at 04:42

## 2024-09-05 RX ADMIN — ALPRAZOLAM 0.5 MG: 0.25 TABLET ORAL at 22:11

## 2024-09-05 RX ADMIN — CEFTRIAXONE SODIUM 2 G: 2 INJECTION, POWDER, FOR SOLUTION INTRAMUSCULAR; INTRAVENOUS at 22:13

## 2024-09-05 RX ADMIN — OXYCODONE HYDROCHLORIDE 10 MG: 5 SOLUTION ORAL at 09:04

## 2024-09-05 RX ADMIN — OXYCODONE HYDROCHLORIDE 10 MG: 5 SOLUTION ORAL at 04:42

## 2024-09-05 RX ADMIN — OXYCODONE HYDROCHLORIDE 10 MG: 5 SOLUTION ORAL at 22:11

## 2024-09-05 RX ADMIN — ONDANSETRON 4 MG: 2 INJECTION INTRAMUSCULAR; INTRAVENOUS at 04:48

## 2024-09-05 RX ADMIN — CARVEDILOL 12.5 MG: 12.5 TABLET, FILM COATED ORAL at 09:02

## 2024-09-05 RX ADMIN — DEXTROAMPHETAMINE SACCHARATE, AMPHETAMINE ASPARTATE, DEXTROAMPHETAMINE SULFATE AND AMPHETAMINE SULFATE 20 MG: 5; 5; 5; 5 TABLET ORAL at 09:02

## 2024-09-05 ASSESSMENT — ACTIVITIES OF DAILY LIVING (ADL)
ADLS_ACUITY_SCORE: 28
DEPENDENT_IADLS:: TRANSPORTATION
ADLS_ACUITY_SCORE: 28

## 2024-09-05 NOTE — CONSULTS
"    Interventional Radiology  [facility] Consult Note  09/05/24   9:47 AM    Consult Requested: \"URGENT azevedo removal due to bacteremia\"    Recommendations/Plan:  -Patient is on IR schedule today for a left TCVC removal with tip culture.   -Labs WNL for procedure.  -Orders entered for procedure. No NPO required.  -Medications to be held include: none.  -Consent will be done prior to procedure.     This is a 53 year old male with past medical history of short gut syndrome with history of TCVC's for TPN.     Vitals:   /89 (BP Location: Left arm)   Pulse 52   Temp 97.9  F (36.6  C) (Oral)   Resp 18   SpO2 99%     Pertinent Labs:   Lab Results   Component Value Date    WBC 6.7 09/04/2024    WBC 6.6 09/04/2024    WBC 8.0 08/13/2024    WBC 6.9 06/29/2021    WBC 8.1 06/14/2021    WBC 7.5 06/09/2021     Lab Results   Component Value Date    HGB 11.0 09/04/2024    HGB 11.4 09/04/2024    HGB 12.9 08/13/2024    HGB 12.1 06/29/2021    HGB 12.0 06/14/2021    HGB 13.3 06/09/2021     Lab Results   Component Value Date     09/04/2024     09/04/2024     08/13/2024     06/29/2021     06/14/2021     06/09/2021     Lab Results   Component Value Date    INR 1.2 (L) 08/12/2024    INR 1.10 01/22/2024    INR 1.07 05/07/2021    PTT 25 09/29/2023    PTT 26 07/22/2019     Lab Results   Component Value Date    POTASSIUM 3.6 09/04/2024    POTASSIUM 3.8 12/13/2022    POTASSIUM 3.5 06/29/2021        COVID-19 Antibody Results, Testing for Immunity           No data to display              COVID-19 PCR Results          8/4/2023    04:27 11/11/2023    01:30   COVID-19 PCR Results   SARS CoV2 PCR Negative  Negative        Juana Burgos PA-C  Interventional Radiology    "

## 2024-09-05 NOTE — TELEPHONE ENCOUNTER
Reason for Call:  Form, our goal is to have forms completed with 72 hours, however, some forms may require a visit or additional information.    Type of letter, form or note:  Home Health Certification    Who is the form from?: Home care    Where did the form come from: form was faxed in    What clinic location was the form placed at?: [facility]    Where the form was placed: Given to physician    What number is listed as a contact on the form?: [phone]       Additional comments: [employer]    Call taken on 9/5/2024 at 2:02 PM by Kristin Jaimes

## 2024-09-05 NOTE — PROGRESS NOTES
Brief Care Management Note:    Infusion Agency:  [facility]  : Sharon Herrera's direct line: [phone] ext [phone]  Fax: [phone]  ____________________________________________    10:00am - Case discussed in rounds this AM.  Provider mentioned pt will have a line holiday for 48-72 hrs, starting today after line removal.    10:04am - [facility]  She stated that [facility] provides TPN for pt.  She was seeking an update on discharge planning.  She stated the cutoff time to receive orders for TPN is 11:00am pacific standard time, on the day before discharge.  She stated they cannot ship TPN on weekends.  She requested provider notes, labs, TPN formula, and SHAN order.  She provided the following fax number: [phone].  She provided the following direct contact information: [phone] ext [phone].    Placed self-consult d/t need for care mgmt involvement in discharge planning.    3:34pm - Called Sharon at [facility], notified her that pt will have 2-3 day line holiday.  She expressed understanding, reiterated the information left in her previous VM.  She confirmed that IV abx can be supplied, in the event pt requires them at discharge.  She stated she will call this writer on Monday for more updates and requested this writer to reach out to her sooner if this writer has a notable update.  No further questions/concerns at this time.    See note filed by Kenia ALEJO this afternoon for care mgmt initial assessment note.

## 2024-09-05 NOTE — PLAN OF CARE
Goal Outcome Evaluation:      Overall Patient Progress: no change    Pt up all noc. Goes outside frequently. C/o abdominal pain and getting prn Oxycodone 10 mg and prn Tylenol. Pt was also c/o nausea and got prn Zofran. Very hyperactive and fidgety but very chatty and pleasant. Constantly having to remind pt not to be touching IV site and j-tube site which is now reddened. Cooperative/able to make needs known. Nursing is monitoring and assisting as needed    Mayi Arriaga RN

## 2024-09-05 NOTE — PROGRESS NOTES
CLINICAL NUTRITION SERVICES - BRIEF NOTE     Nutrition Prescription    Malnutrition Status:    Moderate malnutrition in the context of acute illness.    Recommendations already ordered by Registered Dietitian (RD):  Starting custom PPN:  -Recommend only using Peripheral PN for short-term nutrition support, ~1-2 week duration     Dosing weight:  90.8 kg  Access: peripheral     Initial parameters (per day)  Volume:  2000 mL or per pharmD --> will cycle x 12 hrs  Dextrose: 125 g  AA: 110 g  Lipids: pt declined     Additives: Standard Infuvite and Trace minerals, 100 mg B1/Thiamine x7 days, 5 mg zinc      Goal PN provides 125 g dextrose, 95 g AA for total provision of 805 Kcals (9 Kcals/kg), 1 g/kg protein, GIR .96 mg/kg/minute.    Future/Additional Recommendations:  Monitor lytes.  Monitor central line replacement and ability to resume home TPN.     EVALUATION OF THE PROGRESS TOWARD GOALS   Diet: Regular    Nutrition Support: Chronic TPN at home. Stopped here for line holiday.     NEW FINDINGS   Met with pt. He's agreeable to starting PPN. Declined lipids as he does not like to run those through a peripheral line. He will stop the PN if he decides he doesn't want all of it. He last got his TPN 4 days ago. He takes in 200-300 kcals a day by mouth, depending on what he's able to keep down/keep in.    Labs: reviewed  Meds: reviewed    Home TPN regimen obtained by pharmD and entered into home meds:  Infuse daily. Managed by [facility] [phone]   Total volume: 1260 mL   Cycled over 12 hr   Clinisol 15% 100 gram/day   Dextrose 70% 175 gram/day   Na Acetate 35 mEq/day   K Chloride 85 mEq/day   Mg sulfate 6 mEq/day   Ca gluconate 25 mEq/day   MVI 10 mL/day   Trace 1 mL/day   Zinc 5 mg /day   Famotidine 20 mg/day   Cl:Ace=1: 2.4   Intralipid 20% 50 gram/day ([facility] only)     MALNUTRITION  % Intake: </= 50% for >/= 5 days (severe)  % Weight Loss: None noted  Subcutaneous Fat Loss: None observed - visualized  Muscle Loss:  "Temporal:  mild - visualized  Fluid Accumulation/Edema: None noted  Malnutrition Diagnosis: Moderate malnutrition in the context of acute illness.     INTERVENTIONS  Parenteral nutrition - initiate PPN  Collaboration with other providers - discussed with pharmD. They decided that compounded PN would be a better option than Clinimix.    Monitoring/Evaluation  Progress toward goals will be monitored and evaluated per protocol.     Lavon Barnes, RD, LD  Available on [facility]  Weekend/Holiday RD Vocera - \"Weekend Clinical Dietitian\"  No longer available by paging     Addendum 1:45 pm: pharmD decided to do 24 hr TPN instead of 12 hr and had to decrease provisions d/t osmolality restrictions  Dosing weight:  90.8 kg  Access: peripheral     Initial parameters (per day)  Volume:  1992 mL  Dextrose: 120 g  AA: 93 g  Lipids: pt declined     Additives: Standard Infuvite and Trace minerals, 100 mg B1/Thiamine x7 days, 5 mg zinc      Goal PN provides 120 g dextrose, 93 g AA for total provision of 780 Kcals (8.6 Kcals/kg), 1 g/kg protein, GIR .92 mg/kg/minute.  "

## 2024-09-05 NOTE — PROGRESS NOTES
General ID Service: Follow-up Note     Patient:  Parker Acevedo, Date of birth 1970, Medical record number [id number]  Date of Visit:  September 5, 2024         Assessment and Recommendations:   ID Problem List:  -E.coli bacteremia likely associated with azevedo catheter  -Celestino-en-Y gastric bypass with chronic malnutrition on TPN chronically  -History of many blood stream infections from vascular catheters    Discussion:  Patient on chronic TPN and thus requiring IV access. Has had many infections. There is some concern as to why he keeps getting them. Pt is not in any immunosuppressing medications. Reports proper hand and equipment sanitation when using line at home.     Blood culture from 8/31 grew E.Coli. Repeat blood culture 9/4 NGTD. Azevedo catheter removed today. Recommend  line holiday until his cultures are negative for 48-72 hours. He can be on PPN in the interim. Should continue IV ceftriaxone for 7 days after first negative blood culture. There are oral options but given his TPN dependence  recommend full duration with ceftriaxone.    ID will continue to follow along. Please call with questions.     Recommendations:  -Agree with pulling azevedo and giving him a 48-72 hour line holiday  -Continue IV ceftriaxone 2g Q24H for 7 days from first negative blood culture  -Monitor 9/4 blood culture and repeat it if it becomes positive.    Recommendations discussed with primary team attending Dr. Schmitt    I have reviewed today's vital signs, medications, labs and imaging.    Patient discussed with Infectious Disease attending Dr. Estelle Delacruz  Resident Physician       Interim Events   Pt is feeling well today. No fevers or chills overnight. Reports that at home he can feel an infection coming on because he gets fatigued and his temp starts to fluctuate. Asked if he could pull line out on his own, educated on the dangers of self-removal. IR to remove line today.         History of  "Present Illness:   From H&P  \"Parker Acevedo is a 53 year old male who has a past medical history of past medical history of Celestino-en-Y gastric bypass, malnutrition, chronic TPN, chronic pain, paroxysmal afib, anemia, ADHD and DVT. He presents to the ED due to positive blood culture at clinic. He has had numerous previous admissions for line infections and states that he has \"had over 200 lines placed\" since his gastric bypass. He has chronic abdominal pain that is maintained on fentanyl patch and oxycodone. He currently states that he feels well, but was concerned that he had a fever with his infusion. He states that his other medical conditions are well managed.\"       The patient reports he was having chills and fatigue. He would get fevers to 101 with putting anything through the line.     He reports his wife gives him TPN and flushes. She says she wears gloves and cleans with alcohol pads before handling the line. The patient says he uses alcohol hand  before touching line.          Review of Systems:   Full 12 point ROS obtained, pertinent positives and negatives as above.       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    Complication of anesthesia     Difficulty swallowing     Gastric ulcer, unspecified as acute or chronic, without mention of hemorrhage, perforation, or obstruction     Gastro-oesophageal reflux disease     Head injury     Hiatal hernia     Other bladder disorder     Other chronic pain     PONV (postoperative nausea and vomiting)     Severe malnutrition (H24)     TPN    Short gut syndrome     Tobacco abuse      Past Surgical History:   Procedure Laterality Date    AMPUTATION      APPENDECTOMY      " BACK SURGERY  11/3/2014    curve in the spine    BIOPSY LYMPH NODE CERVICAL N/A 2/20/2015    Procedure: BIOPSY LYMPH NODE CERVICAL;  Surgeon: Baron Scanlon MD;  [facility]    CHOLECYSTECTOMY      COLONOSCOPY N/A 7/14/2021    Procedure: COLONOSCOPY;  Surgeon: Jimbo Estrada MD;  Location: [facility]    COLONOSCOPY N/A 4/13/2022    Procedure: COLONOSCOPY;  Surgeon: Jimbo Estrada MD;  Location: [facility]    COLONOSCOPY N/A 9/19/2023    Procedure: Colonoscopy;  Surgeon: Jimbo Estrada MD;  Location: [facility] OR    DISCECTOMY, FUSION CERVICAL ANTERIOR ONE LEVEL, COMBINED N/A 2/15/2017    Procedure: COMBINED DISCECTOMY, FUSION CERVICAL ANTERIOR ONE LEVEL;  Surgeon: Darren Campos MD;  [facility]    ENDOSCOPIC INSERTION TUBE GASTROSTOMY  9/9/2013    Procedure: ENDOSCOPIC INSERTION TUBE GASTROSTOMY;;  Surgeon: Francis Vyas MD;  [facility]    ENDOSCOPIC ULTRASOUND UPPER GASTROINTESTINAL TRACT (GI)  4/29/2011    Procedure:ENDOSCOPIC ULTRASOUND UPPER GASTROINTESTINAL TRACT (GI); Both Procedures done Conjointly; Surgeon:NEREIDA HOUSER; [facility]    ENDOSCOPIC ULTRASOUND UPPER GASTROINTESTINAL TRACT (GI)  9/9/2013    Procedure: ENDOSCOPIC ULTRASOUND UPPER GASTROINTESTINAL TRACT (GI);  Endoscopic Ultrasound Guide Gastrostomy Tube Placement  C-arm;  Surgeon: Noe Lizarraga MD;  [facility]    ENDOSCOPIC ULTRASOUND UPPER GASTROINTESTINAL TRACT (GI) N/A 2/24/2021    Procedure: ENDOSCOPIC ULTRASOUND, ESOPHAGOSCOPY / UPPER GASTROINTESTINAL TRACT (GI), esophagastrogastroduodenoscopy;  Surgeon: Berny Bach MD;  [facility]    ENDOSCOPY  03/25/11    EGD, [facility]    ENDOSCOPY  08/04/09    Upper Endoscopy, [facility]    ENDOSCOPY  01/05/09    Upper Endoscopy, [facility]    ESOPHAGOSCOPY, GASTROSCOPY, DUODENOSCOPY (EGD), COMBINED  4/20/2011    Procedure:COMBINED ESOPHAGOSCOPY, GASTROSCOPY, DUODENOSCOPY (EGD); Surgeon:SILVIA  FRANCIS CLEMENTS; [facility]    ESOPHAGOSCOPY, GASTROSCOPY, DUODENOSCOPY (EGD), COMBINED  6/15/2011    Procedure:COMBINED ESOPHAGOSCOPY, GASTROSCOPY, DUODENOSCOPY (EGD); Surgeon:FRANCIS VYAS; [facility]    ESOPHAGOSCOPY, GASTROSCOPY, DUODENOSCOPY (EGD), COMBINED  6/12/2013    Procedure: COMBINED ESOPHAGOSCOPY, GASTROSCOPY, DUODENOSCOPY (EGD);;  Surgeon: Francis Vyas MD;  [facility]    ESOPHAGOSCOPY, GASTROSCOPY, DUODENOSCOPY (EGD), COMBINED  11/22/2013    Procedure: COMBINED ESOPHAGOSCOPY, GASTROSCOPY, DUODENOSCOPY (EGD);;  Surgeon: Francis Vyas MD;  [facility]    ESOPHAGOSCOPY, GASTROSCOPY, DUODENOSCOPY (EGD), COMBINED  4/30/2014    Procedure: COMBINED ESOPHAGOSCOPY, GASTROSCOPY, DUODENOSCOPY (EGD);  Surgeon: Francis Vyas MD;  [facility]    ESOPHAGOSCOPY, GASTROSCOPY, DUODENOSCOPY (EGD), COMBINED N/A 2/20/2015    Procedure: COMBINED ESOPHAGOSCOPY, GASTROSCOPY, DUODENOSCOPY (EGD), BIOPSY SINGLE OR MULTIPLE;  Surgeon: Baron Scanlon MD;  [facility]    ESOPHAGOSCOPY, GASTROSCOPY, DUODENOSCOPY (EGD), COMBINED N/A 9/30/2015    Procedure: COMBINED ESOPHAGOSCOPY, GASTROSCOPY, DUODENOSCOPY (EGD);  Surgeon: Francis Vyas MD;  [facility]    ESOPHAGOSCOPY, GASTROSCOPY, DUODENOSCOPY (EGD), COMBINED N/A 10/3/2019    Procedure: Upper Endoscopy;  Surgeon: Clif Morrow MD;  [facility]    GASTRECTOMY  6/22/2012    Procedure: GASTRECTOMY;  Open Approach, Excise Ulcers,Partial Gastrectomy, Esophagojejunostomy, Hiatal Hernia Repair, Extensive Lysis of Adhesions and Esaphagogastrodudenoscopy.;  Surgeon: Francis Vyas MD;  [facility]    GASTROJEJUNOSTOMY  08/26/09    Extensice enterolysis, partial resect. jejunum, part. resect gastric pouch, gastrojejunostomy anastomosis    HC ESOPH/GAS REFLUX TEST W NASAL IMPED ELECTRODE  8/5/2013    Procedure: ESOPHAGEAL IMPEDENCE FUNCTION TEST 1 HOUR OR LESS;  Surgeon: Halie Lang MD;   [facility]    HEAD & NECK SURGERY  2/15/2017    C5-C6    HERNIA REPAIR  2006    Umbilical hernia    HERNIORRHAPHY HIATAL  6/22/2012    Procedure: HERNIORRHAPHY HIATAL;;  Surgeon: Francis Vyas MD;  [facility]    HERNIORRHAPHY INGUINAL  11/22/2013    Procedure: HERNIORRHAPHY INGUINAL;;  Surgeon: Francis Vyas MD;  [facility]    INSERT PICC LINE Right 12/19/2019    Procedure: Picc Placement;  Surgeon: Per Dumont PA-C;  [facility]    INSERT PICC LINE Right 2/21/2020    Procedure: INSERTION, PICC;  Surgeon: Per Dumont PA-C;  [facility]    INSERT PORT VASCULAR ACCESS Right 12/19/2017    Procedure: INSERT PORT VASCULAR ACCESS;  Right Chest Port Placement ;  Surgeon: Lisandro Alejandro PA-C;  [facility]    INSERT PORT VASCULAR ACCESS Right 8/2/2018    Procedure: INSERT PORT VASCULAR ACCESS;  Place single lumen tunneled central venous access catheter;  Surgeon: Guy Jamil PA-C;  [facility]    IR CVC TUNNEL PLACEMENT > 5 YRS OF AGE  8/7/2019    IR CVC TUNNEL PLACEMENT > 5 YRS OF AGE  4/14/2020    IR CVC TUNNEL PLACEMENT > 5 YRS OF AGE  8/3/2020    IR CVC TUNNEL PLACEMENT > 5 YRS OF AGE  9/4/2020    IR CVC TUNNEL PLACEMENT > 5 YRS OF AGE  2/5/2021    IR CVC TUNNEL PLACEMENT > 5 YRS OF AGE  3/23/2021    IR CVC TUNNEL PLACEMENT > 5 YRS OF AGE  1/13/2022    IR CVC TUNNEL PLACEMENT > 5 YRS OF AGE  5/12/2022    IR CVC TUNNEL PLACEMENT > 5 YRS OF AGE  7/13/2022    IR CVC TUNNEL PLACEMENT > 5 YRS OF AGE  7/10/2023    IR CVC TUNNEL PLACEMENT > 5 YRS OF AGE  11/17/2023    IR CVC TUNNEL PLACEMENT > 5 YRS OF AGE  1/25/2024    IR CVC TUNNEL PLACEMENT > 5 YRS OF AGE  6/19/2024    IR CVC TUNNEL REMOVAL LEFT  6/7/2023    IR CVC TUNNEL REMOVAL LEFT  11/14/2023    IR CVC TUNNEL REMOVAL LEFT  1/22/2024    IR CVC TUNNEL REMOVAL RIGHT  10/1/2019    IR CVC TUNNEL REMOVAL RIGHT  7/30/2020    IR CVC TUNNEL REMOVAL RIGHT  9/2/2020    IR CVC TUNNEL REMOVAL RIGHT   2/3/2021    IR CVC TUNNEL REMOVAL RIGHT  3/19/2021    IR CVC TUNNEL REMOVAL RIGHT  1/10/2022    IR CVC TUNNEL REMOVAL RIGHT  5/9/2022    IR CVC TUNNEL REMOVAL RIGHT  7/8/2022    IR CVC TUNNEL REMOVAL RIGHT  6/17/2024    IR CVC TUNNEL REVISION LEFT  8/10/2022    IR CVC TUNNEL REVISION LEFT  9/19/2023    IR CVC TUNNEL REVISION LEFT  8/12/2024    IR CVC TUNNEL REVISION RIGHT  5/7/2021    IR FOLLOW UP VISIT OUTPATIENT  8/7/2019    IR PICC EXCHANGE LEFT  6/8/2023    IR PICC PLACEMENT > 5 YRS OF AGE  3/7/2019    IR PICC PLACEMENT > 5 YRS OF AGE  12/19/2019    IR PICC PLACEMENT > 5 YRS OF AGE  2/21/2020    IR PICC PLACEMENT > 5 YRS OF AGE  6/7/2023    LAPAROTOMY EXPLORATORY  11/22/2013    Procedure: LAPAROTOMY EXPLORATORY;  Exploratory Laparotomy, Upper Endoscopy, Left Inguinal Hernia Repair;  Surgeon: Francis Vyas MD;  [facility]    LAPAROTOMY EXPLORATORY N/A 9/30/2023    Procedure: Laparotomy exploratory, reduction of intussusception, resection of small bowel with primary anastamosis, upper endoscopy;  Surgeon: Delivs Mercado MD;  [facility]    ORTHOPEDIC SURGERY      PICC INSERTION Right 03/16/2017    5fr DL BioFlo PICC, 42cm (3cm external) in the R medial brachial vein w/ tip in the SVC RA junction.    PICC INSERTION Left 09/23/2017    5fr DL BioFlo PICC, 45cm (1cm external) in the L basilic vein w/ tip in the SVC RA junction.    PICC INSERTION Right 05/16/2019    5Fr - 43cm, Medial brachial vein, low SVC    PICC INSERTION Right 10/02/2019    5Fr - 43cm (2cm external), basilic vein, low SVC    SHAYLEE EN Y BOWEL  2003    SOFT TISSUE SURGERY      THORACIC SURGERY      TONSILLECTOMY      TRANSESOPHAGEAL ECHOCARDIOGRAM INTRAOPERATIVE N/A 1/8/2019    Procedure: TRANSESOPHAGEAL ECHOCARDIOGRAM INTRAOPERATIVE;  Surgeon: GENERIC ANESTHESIA PROVIDER;  [facility]    TRANSESOPHAGEAL ECHOCARDIOGRAM INTRAOPERATIVE N/A 7/7/2023    Procedure: Transesophageal echocardiogram in the OR;  Surgeon: GENERIC  ANESTHESIA PROVIDER;  [facility]    TRANSESOPHAGEAL ECHOCARDIOGRAM INTRAOPERATIVE N/A 11/17/2023    Procedure: ECHOCARDIOGRAM,TRANSESOPHAGEAL,WITH ANESTHESIA;  Surgeon: GENERIC ANESTHESIA PROVIDER;  [facility]    ZZC GASTRIC BYPASS,OBESE<100CM SHAYLEE-EN-Y  2002    lost 300 pounds         Allergies:      Allergies   Allergen Reactions    Bactrim [Sulfamethoxazole-Trimethoprim] Rash    Penicillins Anaphylaxis     Please see Antimicrobial Management Team allergy assessment note 10/10/2018. Patient reported tolerating amoxicillin.  Tolerating cefepime and ceftriaxone without reaction 6/23    Ertapenem Nausea and Vomiting    Doxycycline Rash    Vancomycin Rash     Rash after receiving vancomycin 3/28/16 (infusion reaction?). Tolerated with slower infusion and diphenhydramine premed.  Tolerated 1250mg over 90minutes 7/2023.            Current Antimicrobials:     Ceftriaxone 9/4- present       Family History:     Family History   Problem Relation Age of Onset    Gastrointestinal Disease Mother         Crohns disease    Anxiety Disorder Mother     Thyroid Disease Mother         Grave's disease    Cancer Father         ear cancer-skin cancer/melanoma    Breast Cancer Maternal Grandmother     Macular Degeneration Maternal Grandfather     Anxiety Disorder Sister     Diabetes Maternal Uncle     Breast Cancer Other     Hypertension No family hx of     Hyperlipidemia No family hx of     Cerebrovascular Disease No family hx of     Prostate Cancer No family hx of     Depression No family hx of     Anesthesia Reaction No family hx of     Asthma No family hx of     Osteoporosis No family hx of     Genetic Disorder No family hx of     Obesity No family hx of     Mental Illness No family hx of     Substance Abuse No family hx of     Glaucoma No family hx of             Social History:     Social History     Socioeconomic History    Marital status:      Spouse name: Rose    Number of children: 1    Years of education:  Not on file    Highest education level: GED or equivalent   Occupational History    Not on file   Tobacco Use    Smoking status: Light Smoker     Current packs/day: 0.50     Average packs/day: 0.5 packs/day for 3.0 years (1.5 ttl pk-yrs)     Types: Cigarettes    Smokeless tobacco: Never    Tobacco comments:     8/8/2024  smokes 3-5 cigarettes/day   Vaping Use    Vaping status: Never Used   Substance and Sexual Activity    Alcohol use: No     Comment: quit 2002    Drug use: No    Sexual activity: Yes     Partners: Female     Birth control/protection: None     Comment: no protection   Other Topics Concern    Parent/sibling w/ CABG, MI or angioplasty before 65F 55M? No   Social History Narrative    Not on file     Social Determinants of Health     Financial Resource Strain: Low Risk  (9/4/2024)    Financial Resource Strain     Within the past 12 months, have you or your family members you live with been unable to get utilities (heat, electricity) when it was really needed?: No   Food Insecurity: Low Risk  (9/4/2024)    Food Insecurity     Within the past 12 months, did you worry that your food would run out before you got money to buy more?: No     Within the past 12 months, did the food you bought just not last and you didn t have money to get more?: No   Transportation Needs: Low Risk  (9/4/2024)    Transportation Needs     Within the past 12 months, has lack of transportation kept you from medical appointments, getting your medicines, non-medical meetings or appointments, work, or from getting things that you need?: No   Physical Activity: Unknown (8/4/2020)    Exercise Vital Sign     Days of Exercise per Week: 2 days     Minutes of Exercise per Session: Not on file   Stress: No Stress Concern Present (8/4/2020)    [employer] [place] [employer] - Occupational Stress Questionnaire     Feeling of Stress : Only a little   Social Connections: Unknown (1/1/2022)    Received from [employer] [facility]  [facility], [facility] & [facility]    Social Connections     Frequency of Communication with Friends and Family: Not on file   Interpersonal Safety: Low Risk  (9/4/2024)    Interpersonal Safety     Do you feel physically and emotionally safe where you currently live?: Yes     Within the past 12 months, have you been hit, slapped, kicked or otherwise physically hurt by someone?: No     Within the past 12 months, have you been humiliated or emotionally abused in other ways by your partner or ex-partner?: No   Housing Stability: Low Risk  (9/4/2024)    Housing Stability     Do you have housing? : Yes     Are you worried about losing your housing?: No              Physical Exam:   Ranges forvital signs:  Temp:  [97.9  F (36.6  C)-98.8  F (37.1  C)] 97.9  F (36.6  C)  Pulse:  [52] 52  Resp:  [18] 18  BP: (139-148)/(89-95) 139/89  SpO2:  [99 %] 99 %  No intake or output data in the 24 hours ending 09/04/24 1427    Exam:  GENERAL:  well-developed, well-nourished. He is joking a lot. Unusual affect.  ENT:  Head is normocephalic, atraumatic. Oropharynx is moist without exudates or ulcers.  EYES:  Eyes have anicteric sclerae.    NECK:  Supple.  LUNGS: breathing comfortably on room air.  EXT: Extremities warm and without edema.  SKIN:  No acute rashes.  Line is in place without any surrounding erythema. No tenderness to palpation on azevedo catheter.  NEUROLOGIC:  Grossly nonfocal.         Laboratory Data:   Reviewed.  Pertinent for:    WBC 6.7  LA 0.5    Culture data:    9/4/24   Blood culture ngtd    BLOOD CULTURE 8/31/24  Specimen: Blood - Central Line  Component 3 d ago   CULTURE RESULT Panic    CULTURE Aerobic and Anaerobic Bottles growing Escherichia coli   Resulting Agency [facility]   Susceptibility    Organism Antibiotic Susceptibility   Escherichia coli TRIMETHOPRIM/SULF <=1/19: S   Escherichia coli AMPICILLIN 4: S   Escherichia coli CEFAZOLIN 2: S    Escherichia coli GENTAMICIN <=1: S   Escherichia coli CEFTRIAXONE <=0.25: S   Escherichia coli CEFTAZIDIME <=0.5: S   Escherichia coli LEVOFLOXACIN <=0.12: S   Escherichia coli CIPROFLOXACIN <=0.06: S   Escherichia coli PIPERACILLIN/TAZO <=4: S   Escherichia coli AMPICILLIN/SULBACTAM <=2: S   Escherichia coli CEFEPIME <=0.12: S   Escherichia coli MEROPENEM <=0.25: S            Imagin/12/24 IR note showing placement of catheter  Completed image-guided placement of 5 Portuguese, 33 cm single lumen tunneled central venous catheter via left internal jugular/innominate confluence. Aspirates and flushes freely, heparin locked and ready for immediate use. Tip in high right atrium.

## 2024-09-05 NOTE — PROGRESS NOTES
"VS: /89 (BP Location: Left arm)   Pulse 52   Temp 97.9  F (36.6  C) (Oral)   Resp 18   Wt 88.8 kg (195 lb 11.2 oz)   SpO2 99%   BMI 28.08 kg/m      Orientation Pt is Alert and Oriented x 4   Respiratory Lung sounds clear bilaterally. No cough. Denies SOB, no accessory muscles used. Pt saturations 99% on room air    Mobility/ Activity Ind . Going outside frequently   Bowel/Bladder: Continent of both bowel and bladder. Has not had BM in over a week. Pt states this is normal for him and refuses senna or miralax   IV /LDA R AC PIV Saline locked   Diet Regular Diet- no appetite, not eating.    Gastrointestinal Abdomen is soft and non-tender to palpation but pt reports abdominal discomfort and bloating. Knight snot have vent drain connected this afternoon   Skin / Wounds / Dressings: Some redness around J tube site    Pain Having 7/10 pain in abdomen   Equipment: J tube    Circulation/ Sensation Baseline tingling in feet   PRNS Oxy x2 Carafate for nausea / stomach upset.    Plan Continue to monitor   Additional info: This morning pt stated \"If I don't have my line out by 2:00 today I am going outside to take it out myself\" provider made aware. IR arrived to pts room in early afternoon and removed Line.     Plans to get new line placed either Friday or Monday     "

## 2024-09-05 NOTE — CONSULTS
Care Management Initial Consult    General Information  Assessment completed with: Patient,    Type of CM/SW Visit: Initial Assessment    Primary Care Provider verified and updated as needed: Yes   Readmission within the last 30 days: no previous admission in last 30 days      Reason for Consult: discharge planning  Advance Care Planning: Advance Care Planning Reviewed: present on chart, no concerns identified          Communication Assessment  Patient's communication style: spoken language (English or Bilingual)    Hearing Difficulty or Deaf: no   Wear Glasses or Blind: no    Cognitive  Cognitive/Neuro/Behavioral: WDL                      Living Environment:   People in home: spouse, child(francheska), adult     Current living Arrangements: house      Able to return to prior arrangements: yes       Family/Social Support:  Care provided by: self, spouse/significant other, child(francheska)  Provides care for: no one  Marital Status:   Support system: Wife, Children, Parent(s)  Rose       Description of Support System: Supportive, Involved    Support Assessment: Adequate family and caregiver support, Adequate social supports    Current Resources:   Patient receiving home care services: Yes  Skilled Home Care Services: Skilled Nursing     Community Resources: Infusion Services, Home Care ([facility])  Equipment currently used at home: cane, straight  Supplies currently used at home: Other (TPN supplies)    Employment/Financial:  Employment Status: disabled        Financial Concerns: none   Referral to Financial Worker: No       Does the patient's insurance plan have a 3 day qualifying hospital stay waiver?  Yes     Which insurance plan 3 day waiver is available? Alternative insurance waiver    Will the waiver be used for post-acute placement? No    Lifestyle & Psychosocial Needs:  Social Determinants of Health     Food Insecurity: Low Risk  (9/4/2024)    Food Insecurity     Within the past 12 months, did you worry that  your food would run out before you got money to buy more?: No     Within the past 12 months, did the food you bought just not last and you didn t have money to get more?: No   Depression: Not at risk (3/4/2024)    PHQ-2     PHQ-2 Score: 0   Housing Stability: Low Risk  (9/4/2024)    Housing Stability     Do you have housing? : Yes     Are you worried about losing your housing?: No   Tobacco Use: High Risk (8/12/2024)    Patient History     Smoking Tobacco Use: Light Smoker     Smokeless Tobacco Use: Never     Passive Exposure: Not on file   Financial Resource Strain: Low Risk  (9/4/2024)    Financial Resource Strain     Within the past 12 months, have you or your family members you live with been unable to get utilities (heat, electricity) when it was really needed?: No   Alcohol Use: Not At Risk (8/4/2020)    AUDIT-C     Frequency of Alcohol Consumption: Never     Average Number of Drinks: Patient declined     Frequency of Binge Drinking: Never   Transportation Needs: Low Risk  (9/4/2024)    Transportation Needs     Within the past 12 months, has lack of transportation kept you from medical appointments, getting your medicines, non-medical meetings or appointments, work, or from getting things that you need?: No   Physical Activity: Unknown (8/4/2020)    Exercise Vital Sign     Days of Exercise per Week: 2 days     Minutes of Exercise per Session: Not on file   Interpersonal Safety: Low Risk  (9/4/2024)    Interpersonal Safety     Do you feel physically and emotionally safe where you currently live?: Yes     Within the past 12 months, have you been hit, slapped, kicked or otherwise physically hurt by someone?: No     Within the past 12 months, have you been humiliated or emotionally abused in other ways by your partner or ex-partner?: No   Stress: No Stress Concern Present (8/4/2020)    [employer] - Occupational Stress Questionnaire     Feeling of Stress : Only a little   Social  Connections: Unknown (1/1/2022)    [facility], [employer] [facility]    Social Connections     Frequency of Communication with Friends and Family: Not on file   Health Literacy: Not on file       Functional Status:  Prior to admission patient needed assistance:   Dependent ADLs:: Independent  Dependent IADLs:: Transportation  Assesssment of Functional Status: At functional baseline    Mental Health Status:  Mental Health Status: No Current Concerns       Chemical Dependency Status:  Chemical Dependency Status: No Current Concerns             Values/Beliefs:  Spiritual, Cultural Beliefs, Orthodoxy Practices, Values that affect care: no               Discussed  Partnership in Safe Discharge Planning  document with patient/family: No    Additional Information:    SW met with patient at bedside and introduced self/role. SW completed Initial Assessment. Please see above for assessment information.    SW reviewed demographic information (address, patient phone number, emergency contacts, insurance), no changes necessary.    Patient is on TPN with [facility]. Please see GERRI Jon note for communication between [facility] and [facility].    Next Steps:     Coordinate discharge with [facility] as needed.        JULIO CESAR Schaffer, LSW  8 Med Surg   [facility]  Phone: [phone]  Vocera: Searchable by name or group: 8 Med Surg Vocera       pain control-> pain med may cause drowsiness, refrain from activities require decision making; physical therapy to help resume activities of daily living Office appointment is already made (see card attached to discharge folder) so if you need to reschedule please call Dr. Davis's office [phone]  weight bearing as tolerated to Left leg

## 2024-09-05 NOTE — PROGRESS NOTES
[facility]    Medicine Progress Note - Hospitalist Service, GOLD TEAM 21    Date of Admission:  9/3/2024    Assessment & Plan      Parker Acevedo is a 53 year old male admitted on 9/3/2024. He has a past medical history of Celestino-en-Y gastric bypass, malnutrition, chronic TPN, chronic pain, paroxysmal afib, anemia, ADHD and DVT. He presents to the ED due to positive blood culture at clinic.    Today  - ctn ceftriaxone  - Cm removed    # CLABSI, prior to admit  # Indwelling Cm catheter for TPN, removed  He has had many prior admissions for CLABSI, last admitted 6/14/24 to 6/19/24 for line infection with methicillin sensitive staph epidermidis. He was seen at his clinic due to fevers, blood culture was done and came back positive for E coli. He was told to go to the ER. WBC 6.6, lactic acid 0.5. He was give a 1000 ml bolus and started on rocephin  - ID consulted   - continue ceftriaxone X7 days from negative blood culture  - Cultures   - Bcx 9/4: NGTD   - Catheter tip Cx 9/5  - Cm removed on 9/5 for 48-72 hour line holiday    # History of obesity, s/p Celestino-en-Y gastric bypass  # Short gut syndrome  # GERD  He has a J-tube for venting. He attempted tube feedings years ago but was unable to absorb it due to short gut syndrome. He is now maintenance on daily TPN and 3 times a week lipids. He eats about 300 calories daily  - Pharmacy consult for TPN/lipids  - Zofran PRN  - PTA Carafate  - Regular diet    # Anemia  Hgb has been 11-13 and was 11.4 on admitted.   - monitor    # Chronic pain   - Continue fentanyl patch   - PTA oxycodone    # ADHD   - PTA amphetamine-dextroamphetamine     # Paroxysmal Afib  - Continue carvedilol 12.5mg BID    # DVT   Non occlusive deep venous thrombus is seen within the mid and proximal right subclavian vein and right internal jugular vein 7/4/23   -Continue enoxaparin as prescribed PTA           Diet: Regular Diet Adult  parenteral  "nutrition - ADULT compounded formula    DVT Prophylaxis: Enoxaparin (Lovenox) SQ  Sanchez Catheter: Not present  Lines: PRESENT             Cardiac Monitoring: None  Code Status: Full Code      Clinically Significant Risk Factors          # Hypocalcemia: Lowest Ca = 8.5 mg/dL in last 2 days, will monitor and replace as appropriate                   # Overweight: Estimated body mass index is 28.73 kg/m  as calculated from the following:    Height as of 8/8/24: 1.778 m (5' 10\").    Weight as of 8/12/24: 90.8 kg (200 lb 3.2 oz)., PRESENT ON ADMISSION  # Moderate Malnutrition: based on nutrition assessment, PRESENT ON ADMISSION   # Financial/Environmental Concerns:                 Disposition Plan     Medically Ready for Discharge: Anticipated in 2-4 Days             [facility] Hospitalist Service, GOLD TEAM 21  [facility]  Securely message with [employer] (more info)  Text page via [employer] Paging/Directory   See signed in provider for up to date coverage information  ______________________________________________________________________    Interval History   This morning, Parker is frustrated that his Cm has not been removed yet. Understood the frustration given that it has been over 24 hours since admit. Was able to get IR to remove today. Parker also reports that he is worried his infections are coming from the nurses bringing in bacteria after visiting another patient before coming to his home. He had many questions about the home nursing arrangement and I stated that he should ask the care management team about those things.     Physical Exam   Vital Signs: Temp: 97.9  F (36.6  C) Temp src: Oral BP: 139/89     Resp: 18        Weight: 0 lbs 0 oz    General: well appearing, awake  Resp: breathing comfortable on room air  Abd: non-tender  Neuro: alert, oriented, normal gait    Medical Decision Making             Data         Imaging results reviewed over the past 24 hrs: "   Recent Results (from the past 24 hour(s))   IR CVC Tunnel Removal Left    Narrative    DIAGNOSIS: bacteremia    PROCEDURE: IR CVC TUNNEL REMOVAL LEFT       Impression    IMPRESSION: Completed removal of tunneled central venous catheter in  its entirety. There were no complications. Catheter tip sent to lab.    ----------    CLINICAL HISTORY: The patient has a tunneled central venous catheter  placed for TPN. Patient presents for removal as it is no longer  needed.    PERFORMED BY: Aiyana Burgos PA-C    MEDICATIONS: none    DESCRIPTION: Physical examination demonstrated no erythema,  tenderness, fluctuance, or discharge at the catheter exit site or  along the tract. The catheter and its entry site into the skin was  prepped and draped in usual sterile fashion.     Using steady gentle traction, the catheter and cuff were freed from  the subcutaneous tissue, and the entire catheter was removed without  difficulty. Compression was applied over the venipuncture site, as  well as along the tract, until good hemostasis was achieved. A sterile  dressing was applied to the skin at the exit site.    COMPLICATIONS:  No immediate concerns; the patient remained stable  throughout the procedure and tolerated it well.    ESTIMATED BLOOD LOSS:  Minimal    SPECIMENS: catheter tip    TIME:  A total of 15 minutes was spent with the patient. Greater than  50% of the time was spent in counseling, education, and coordination  of care.    ESTEFANI BURGOS PA-C         SYSTEM ID:  [id number]

## 2024-09-05 NOTE — PLAN OF CARE
Goal Outcome Evaluation:      Plan of Care Reviewed With: patient          Outcome Evaluation: Discussed discharge planning. Patient plans to discharge home once medically ready. Will resume outpatient services.

## 2024-09-06 LAB
ANION GAP SERPL CALCULATED.3IONS-SCNC: 15 MMOL/L (ref 7–15)
BUN SERPL-MCNC: 16.8 MG/DL (ref 6–20)
CALCIUM SERPL-MCNC: 8.9 MG/DL (ref 8.8–10.4)
CHLORIDE SERPL-SCNC: 102 MMOL/L (ref 98–107)
CREAT SERPL-MCNC: 0.83 MG/DL (ref 0.67–1.17)
EGFRCR SERPLBLD CKD-EPI 2021: >90 ML/MIN/1.73M2
ERYTHROCYTE [DISTWIDTH] IN BLOOD BY AUTOMATED COUNT: 16.9 % (ref 10–15)
GLUCOSE SERPL-MCNC: 90 MG/DL (ref 70–99)
HCO3 SERPL-SCNC: 23 MMOL/L (ref 22–29)
HCT VFR BLD AUTO: 35.3 % (ref 40–53)
HGB BLD-MCNC: 11.4 G/DL (ref 13.3–17.7)
MCH RBC QN AUTO: 29.2 PG (ref 26.5–33)
MCHC RBC AUTO-ENTMCNC: 32.3 G/DL (ref 31.5–36.5)
MCV RBC AUTO: 90 FL (ref 78–100)
PLATELET # BLD AUTO: 287 10E3/UL (ref 150–450)
POTASSIUM SERPL-SCNC: 3.8 MMOL/L (ref 3.4–5.3)
RBC # BLD AUTO: 3.91 10E6/UL (ref 4.4–5.9)
SODIUM SERPL-SCNC: 140 MMOL/L (ref 135–145)
WBC # BLD AUTO: 6.3 10E3/UL (ref 4–11)

## 2024-09-06 PROCEDURE — 99233 SBSQ HOSP IP/OBS HIGH 50: CPT | Mod: 24 | Performed by: INTERNAL MEDICINE

## 2024-09-06 PROCEDURE — 250N000009 HC RX 250: Performed by: STUDENT IN AN ORGANIZED HEALTH CARE EDUCATION/TRAINING PROGRAM

## 2024-09-06 PROCEDURE — 85027 COMPLETE CBC AUTOMATED: CPT | Performed by: STUDENT IN AN ORGANIZED HEALTH CARE EDUCATION/TRAINING PROGRAM

## 2024-09-06 PROCEDURE — 250N000011 HC RX IP 250 OP 636: Performed by: EMERGENCY MEDICINE

## 2024-09-06 PROCEDURE — 36415 COLL VENOUS BLD VENIPUNCTURE: CPT

## 2024-09-06 PROCEDURE — 80048 BASIC METABOLIC PNL TOTAL CA: CPT | Performed by: STUDENT IN AN ORGANIZED HEALTH CARE EDUCATION/TRAINING PROGRAM

## 2024-09-06 PROCEDURE — 87040 BLOOD CULTURE FOR BACTERIA: CPT

## 2024-09-06 PROCEDURE — 250N000011 HC RX IP 250 OP 636: Performed by: NURSE PRACTITIONER

## 2024-09-06 PROCEDURE — 250N000013 HC RX MED GY IP 250 OP 250 PS 637: Performed by: NURSE PRACTITIONER

## 2024-09-06 PROCEDURE — 120N000002 HC R&B MED SURG/OB UMMC

## 2024-09-06 PROCEDURE — 99232 SBSQ HOSP IP/OBS MODERATE 35: CPT | Performed by: STUDENT IN AN ORGANIZED HEALTH CARE EDUCATION/TRAINING PROGRAM

## 2024-09-06 PROCEDURE — 999N000007 HC SITE CHECK

## 2024-09-06 PROCEDURE — 36415 COLL VENOUS BLD VENIPUNCTURE: CPT | Performed by: STUDENT IN AN ORGANIZED HEALTH CARE EDUCATION/TRAINING PROGRAM

## 2024-09-06 PROCEDURE — 999N000127 HC STATISTIC PERIPHERAL IV START W US GUIDANCE

## 2024-09-06 RX ORDER — CEFTRIAXONE 2 G/1
2 INJECTION, POWDER, FOR SOLUTION INTRAMUSCULAR; INTRAVENOUS AT BEDTIME
Qty: 80 ML | Refills: 0 | Status: ACTIVE | OUTPATIENT
Start: 2024-09-06 | End: 2024-09-09

## 2024-09-06 RX ORDER — LIDOCAINE 40 MG/G
CREAM TOPICAL
Status: DISCONTINUED | OUTPATIENT
Start: 2024-09-06 | End: 2024-09-11 | Stop reason: HOSPADM

## 2024-09-06 RX ADMIN — CARVEDILOL 12.5 MG: 12.5 TABLET, FILM COATED ORAL at 08:35

## 2024-09-06 RX ADMIN — CEFTRIAXONE SODIUM 2 G: 2 INJECTION, POWDER, FOR SOLUTION INTRAMUSCULAR; INTRAVENOUS at 22:58

## 2024-09-06 RX ADMIN — ALPRAZOLAM 0.5 MG: 0.25 TABLET ORAL at 08:36

## 2024-09-06 RX ADMIN — OXYCODONE HYDROCHLORIDE 10 MG: 5 SOLUTION ORAL at 14:22

## 2024-09-06 RX ADMIN — ONDANSETRON 4 MG: 4 TABLET, ORALLY DISINTEGRATING ORAL at 04:55

## 2024-09-06 RX ADMIN — ONDANSETRON 4 MG: 4 TABLET, ORALLY DISINTEGRATING ORAL at 14:22

## 2024-09-06 RX ADMIN — OXYCODONE HYDROCHLORIDE 10 MG: 5 SOLUTION ORAL at 23:08

## 2024-09-06 RX ADMIN — MAGNESIUM SULFATE HEPTAHYDRATE: 500 INJECTION, SOLUTION INTRAMUSCULAR; INTRAVENOUS at 20:47

## 2024-09-06 RX ADMIN — OXYCODONE HYDROCHLORIDE 10 MG: 5 SOLUTION ORAL at 04:55

## 2024-09-06 RX ADMIN — ONDANSETRON 4 MG: 4 TABLET, ORALLY DISINTEGRATING ORAL at 08:39

## 2024-09-06 RX ADMIN — OXYCODONE HYDROCHLORIDE 10 MG: 5 SOLUTION ORAL at 08:37

## 2024-09-06 RX ADMIN — ONDANSETRON 4 MG: 4 TABLET, ORALLY DISINTEGRATING ORAL at 23:08

## 2024-09-06 RX ADMIN — ALPRAZOLAM 0.5 MG: 0.25 TABLET ORAL at 23:08

## 2024-09-06 RX ADMIN — ENOXAPARIN SODIUM 80 MG: 80 INJECTION SUBCUTANEOUS at 20:45

## 2024-09-06 RX ADMIN — FENTANYL 1 PATCH: 25 PATCH TRANSDERMAL at 22:55

## 2024-09-06 RX ADMIN — SUCRALFATE 1 G: 1 SUSPENSION ORAL at 08:36

## 2024-09-06 RX ADMIN — CARVEDILOL 12.5 MG: 12.5 TABLET, FILM COATED ORAL at 18:06

## 2024-09-06 RX ADMIN — DEXTROAMPHETAMINE SACCHARATE, AMPHETAMINE ASPARTATE, DEXTROAMPHETAMINE SULFATE AND AMPHETAMINE SULFATE 20 MG: 5; 5; 5; 5 TABLET ORAL at 08:35

## 2024-09-06 RX ADMIN — ENOXAPARIN SODIUM 80 MG: 80 INJECTION SUBCUTANEOUS at 08:39

## 2024-09-06 ASSESSMENT — ACTIVITIES OF DAILY LIVING (ADL)
ADLS_ACUITY_SCORE: 28

## 2024-09-06 NOTE — PROGRESS NOTES
[facility]    Medicine Progress Note - Hospitalist Service, GOLD TEAM 21    Date of Admission:  9/3/2024    Assessment & Plan      Parker Acevedo is a 53 year old male admitted on 9/3/2024. He has a past medical history of Celestino-en-Y gastric bypass, malnutrition, chronic TPN, chronic pain, paroxysmal afib, anemia, ADHD and DVT. He presents to the ED due to positive blood culture at clinic.    Today  - ctn ceftriaxone 9/4 - 9/10  - home infusion company is unable to deliver abx before end of prescription, discharge must wait until last dose is given    # CLABSI, prior to admit  # Indwelling Cm catheter for TPN, removed  He has had many prior admissions for CLABSI, last admitted 6/14/24 to 6/19/24 for line infection with methicillin sensitive staph epidermidis. He was seen at his clinic due to fevers, blood culture was done and came back positive for E coli. He was told to go to the ER. WBC 6.6, lactic acid 0.5. He was give a 1000 ml bolus and started on rocephin  - ID consulted   - continue ceftriaxone X7 days from negative blood culture (9/4-9/10)  - Cultures   - Bcx 9/4: NGTD   - Catheter tip Cx 9/5 with Ecoli   - Cm removed on 9/5 for 48-72 hour line holiday   - IR to be consulted on 9/9 for replacement of CVC    # History of obesity, s/p Celestino-en-Y gastric bypass  # Short gut syndrome  # GERD  He has a J-tube for venting. He attempted tube feedings years ago but was unable to absorb it due to short gut syndrome. He is now maintenance on daily TPN and 3 times a week lipids. He eats about 300 calories daily  - Pharmacy consult for TPN/lipids  - Zofran PRN  - PTA Carafate  - Regular diet    # Anemia  Hgb has been 11-13 and was 11.4 on admitted.   - monitor    # Chronic pain   - Continue fentanyl patch   - PTA oxycodone    # ADHD   - PTA amphetamine-dextroamphetamine     # Paroxysmal Afib  - Continue carvedilol 12.5mg BID    # DVT   Non occlusive deep venous  "thrombus is seen within the mid and proximal right subclavian vein and right internal jugular vein 7/4/23   -Continue enoxaparin as prescribed PTA           Diet: Regular Diet Adult  parenteral nutrition - ADULT compounded formula  parenteral nutrition - ADULT compounded formula    DVT Prophylaxis: Enoxaparin (Lovenox) SQ  Sanchez Catheter: Not present  Lines: PRESENT             Cardiac Monitoring: None  Code Status: Full Code      Clinically Significant Risk Factors                            # Overweight: Estimated body mass index is 28.08 kg/m  as calculated from the following:    Height as of 8/8/24: 1.778 m (5' 10\").    Weight as of this encounter: 88.8 kg (195 lb 11.2 oz)., PRESENT ON ADMISSION  # Moderate Malnutrition: based on nutrition assessment, PRESENT ON ADMISSION     # Financial/Environmental Concerns: none               Disposition Plan     Medically Ready for Discharge: Anticipated in 2-4 Days             [facility] Hospitalist Service, GOLD TEAM 21  [facility]  Securely message with [employer] (more info)  Text page via [employer] Paging/Directory   See signed in provider for up to date coverage information  ______________________________________________________________________    Interval History   Parker is doing well and has no symptoms currently. Reports he is bored, but otherwise doing well. Discussed importance of letting nurses provide cares and not unhooking his IV on his own which he was understanding of.     Physical Exam   Vital Signs: Temp: 98.7  F (37.1  C) Temp src: Oral BP: 116/79     Resp: 18        Weight: 195 lbs 11.2 oz    General: well appearing, awake  Resp: breathing comfortable on room air  Neuro: alert, oriented, normal gait    Medical Decision Making             Data     I have personally reviewed the following data over the past 24 hrs:    6.3  \   11.4 (L)   / 287     140 102 16.8 /  90   3.8 23 0.83 \       Imaging results reviewed " over the past 24 hrs:   No results found for this or any previous visit (from the past 24 hour(s)).

## 2024-09-06 NOTE — PLAN OF CARE
Goal Outcome Evaluation:      Plan of Care Reviewed With: patient    Overall Patient Progress: no change    Outcome Evaluation: Pt keeps disconnecting self from TPN, writer reiterated importance of staying connected, MD aware. TPN currently stopped due to infiltration of iv. MD put in order for extended dwell and vascular consult put in.    VS:       Pt A/O X 4. Afebrile. VSS./79 (BP Location: Left arm)   Pulse 52   Temp 98.7  F (37.1  C) (Oral)   Resp 18   Wt 88.8 kg (195 lb 11.2 oz)   SpO2 99%   BMI 28.08 kg/m    Lungs- clear bilaterally with both anterior and posterior. Denies nausea, shortness of breath, and chest pain.     Output:       Bowels- active in all four quadrants. Voids spontaneously without difficulty in the bathroom.      Activity:       Pt up independently.     Skin:   Skin intact.     Pain:       Has pain in the abdomin and given oxycodone, and is tolerating well.      CMS:       CMS and Neuro's are intact. Denies numbness and tingling in all extremities.      Dressing:       No incisional dressing.      Diet:       Pt is on a regular diet and appetite was poor this shift.     LDA:       J tube in place for venting.      Equipment:       Pt belongings in the room. Bilateral heels are elevated off the bed.     Plan:       Pt is able to make needs known and the call light is within the pt's reach. Continue to monitor.       Additional Info:

## 2024-09-06 NOTE — PROGRESS NOTES
Care Management Follow Up    Length of Stay (days): 2    Expected Discharge Date: 09/11/2024     Concerns to be Addressed: discharge planning     Patient plan of care discussed at interdisciplinary rounds: Yes    Anticipated Discharge Disposition: Home     Anticipated Discharge Services:  (Resumption of Home Infusion [TPN])  Anticipated Discharge DME: No new DME anticipated    Patient/family educated on Medicare website which has current facility and service quality ratings: no (N/A)  Education Provided on the Discharge Plan: Yes (not by this writer)  Patient/Family in Agreement with the Plan: yes    Referrals Placed by CM/SW: (None at this time)  Private pay costs discussed: Not applicable    Discussed  Partnership in Safe Discharge Planning  document with patient/family: No     Handoff Completed: Not currently    Additional Information:    Infusion Agency:  [facility]  : Sharon Herrera's direct line: [phone] ext [phone]  Fax: [phone]  Services provided prior to admission: TPN    Home Care Agency:  [facility]  Ph: [phone]  Fx: [phone]  Services provided prior to admission: RN related to pt's central line cares and lab draws  ____________________________________________    10:00am - Case discussed in rounds this AM.  Per provider, pt will be able to discharge on Monday, will require very short course of IV abx post-discharge (ending Tuesday night).  Notified provider of [facility] requiring orders by 11:00am PST today if it is to be possible to discharge pt on Monday, provider acknowledged and stated she will follow up w/ pharmacy about TPN formula.    Received update from provider that pharmacy stated no changes are to be made to PTA TPN formula.  Per provider, pharmacy did not seem like they were going to provide a written formula for the TPN at this time d/t no changes.  Provider stated she has entered drug orders for IV abx.    10:55am - Called Sharon at [facility], left  notifying  her of intention to discharge pt on Monday.  Stated that relevant documentation/orders will be faxed soon.  Requested call back to confirm receipt of fax and to clarify if separate TPN formula order is needed or if SHAN order will be sufficient, given no changes are to be made.  Also requested clarification on if [facility] needs to coordinate teaching for IV abx administration.  Provided direct call back number.    Placed SHAN order for [facility] to resume nursing services and TPN w/o changes to PTA formula.  Also requested initiation of new IV abx.  Also placed home infusion referral order for IV abx.  Provider signature required for these orders.    11:01am - Notified provider of need for cosign and requested provider to place electronic signature order so that orders can be sent to [facility].  Provider acknowledged, signed orders.  Provider clarified that current RD documentation regarding PPN is only applicable while pt does not have central line, so is not applicable post-discharge.    11:20am - Faxed facesheet, IATF physician orders document, latest ID note and medicine provider note, and labs to [facility].    11:32am - Received call from Sharon at [facility], she stated they cannot do Monday delivery d/t inability to compound TPN over weekend.  Inquired if it can be compounded today and stored for Monday delivery, she stated no.  She explained that the earliest they can deliver is Tuesday (9/10).  Discussed IV abx, explained that last dose is likely to be Tuesday night.  Sharon stated that d/t potential for delivery delay, [facility] requests that pt receive all IV abx doses on Tuesday before discharge (which would make home IV abx no longer applicable, so long as abx plan does not change).  Regarding TPN, given that there are no changes to PTA formula, the SHAN order is sufficient for [facility] to compound TPN and resume providing their services.  Regarding teaching for IV abx, if pt were to need IV  abx post-discharge, [facility] would arrange for an agency to provide teaching on same day as discharge.  Sharon and RNCC to discuss further on Monday regarding discharge plan.  Sharon to call this writer back after she checks over the previously sent fax to notify this writer if anything additional is needed.  Orders may need to be re-sent next week.    Based on the above discussion w/ Sharon and the administration time of pt's IV abx, this writer anticipates that pt will need to discharge on Wednesday after fully completing the IV abx course on Tuesday night.  Orders would then need to be sent on Tuesday by 11:00am PST.    Reached out to provider via [employer], requested call back at provider's convenience.    Received call from provider, notified her of conversation w/ [facility].  Provider acknowledged, stated pt will need to discharge on Wednesday, given the situation.    Of note, [facility] (see CMA note filed by Kenia ALEJO for details).  This writer noticed a telephone encounter note was created yesterday afternoon by Kristin Jaimes regarding home health certification w/ [facility], which may indicate [facility].    Called [facility] at [phone], spoke w/ Mendy, she confirmed pt is receiving nursing services related to central line/TPN/labs.  She confirmed that [facility] has been partnering w/ them for this pt, but that [facility] would still need a resumption of care order provided by [facility] at discharge.  Informed her RUPAL Wednesday 9/11, she expressed understanding, no questions/concerns at this time.    Entered SHAN order for [facility].  Modified SHAN order for [facility] to remove request to initiate new IV abx, as this will not be applicable at time of discharge.  Orders require provider signature.    Addendum:    Based on ID note filed this afternoon, it seems IV abx have been extended beyond Tuesday night, so home IV abx will  "likely be needed.        Next Steps:    - Talk w/ Sharon from [facility] on Monday to further discuss discharge planning and what [facility] needs to receive (anticipate needing to send orders and potentially updated notes and labs on Tuesday 9/10 by 11:00am PST)    - Send orders to [facility] at discharge    - Address additional discharge needs that may arise          Care mgmt will continue to follow.    GERRI Berry  [facility]  Units 8M/S & 10 ICU  Reachable on [employer] - Search by name or search \"RNCC\"  Phone: [phone]    "

## 2024-09-06 NOTE — PROGRESS NOTES
"    General ID Service: Follow-up Note     Patient:  Parker Acevedo, Date of birth 1970, Medical record number [id number]  Date of Visit:  September 6, 2024         Assessment and Recommendations:   ID Problem List:  -E.coli bacteremia associated with azevedo catheter  -Celestino-en-Y gastric bypass with chronic malnutrition on TPN chronically  -History of many blood stream infections from vascular catheters    Discussion:  Patient on chronic TPN and thus requiring IV access. Has had many infections. There is some concern as to why he keeps getting them. Pt is not in any immunosuppressing medications. Reports proper hand and equipment sanitation when using line at home.     Blood culture from 8/31 grew E.Coli. Repeat blood culture 9/4 NGTD, but Azevedo catheter removed on 8/5 did grow E.coli at time of removal. Further confirmation that bacteremia is catheter associated. Ordering repeat blood cultures today, would recommend line holiday for 48 hours from today, as long as today's blood culture is negative. Continue PPN in the interim. Should continue IV ceftriaxone for 7 days after today's blood culture to ensure full treatment s/p source control with line removal.     ID will sign off. Please call with questions.     Recommendations:  -Ordered repeat blood culture today 9/6  - 48 hour line holiday from today's blood culture (if negative)  -Continue IV ceftriaxone 2g Q24H for 7 days from today's blood culture    Recommendations communicated to primary team.    I have reviewed today's vital signs, medications, labs and imaging.    Patient discussed with Infectious Disease attending Dr. Estelle Delacruz  Resident Physician       Interim Events   Pt is feeling well. No fevers or chills overnight. IR removed line yesterday, culture from line now growing E. Coli.     History of Present Illness:   From H&P  \"Parker Acevedo is a 53 year old male who has a past medical history of past medical history of " "Celestino-en-Y gastric bypass, malnutrition, chronic TPN, chronic pain, paroxysmal afib, anemia, ADHD and DVT. He presents to the ED due to positive blood culture at clinic. He has had numerous previous admissions for line infections and states that he has \"had over 200 lines placed\" since his gastric bypass. He has chronic abdominal pain that is maintained on fentanyl patch and oxycodone. He currently states that he feels well, but was concerned that he had a fever with his infusion. He states that his other medical conditions are well managed.\"       The patient reports he was having chills and fatigue. He would get fevers to 101 with putting anything through the line.     He reports his wife gives him TPN and flushes. She says she wears gloves and cleans with alcohol pads before handling the line. The patient says he uses alcohol hand  before touching line.          Review of Systems:   Full 12 point ROS obtained, pertinent positives and negatives as above.       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    Complication of anesthesia     Difficulty swallowing     Gastric ulcer, unspecified as acute or chronic, without mention of hemorrhage, perforation, or obstruction     Gastro-oesophageal reflux disease     Head injury     Hiatal hernia     Other bladder disorder     Other chronic pain     PONV (postoperative nausea and vomiting)     Severe malnutrition (H24)     TPN    Short gut syndrome     Tobacco abuse      Past Surgical History:   Procedure Laterality Date    AMPUTATION      APPENDECTOMY      BACK SURGERY  11/3/2014    curve in the spine    BIOPSY LYMPH NODE CERVICAL N/A 2/20/2015    Procedure: BIOPSY LYMPH NODE " CERVICAL;  Surgeon: Baron Scanlon MD;  [facility]    CHOLECYSTECTOMY      COLONOSCOPY N/A 7/14/2021    Procedure: COLONOSCOPY;  Surgeon: Jimbo Estrada MD;  Location: [facility] OR    COLONOSCOPY N/A 4/13/2022    Procedure: COLONOSCOPY;  Surgeon: Jimbo Estrada MD;  Location: [facility] OR    COLONOSCOPY N/A 9/19/2023    Procedure: Colonoscopy;  Surgeon: Jimbo Estrada MD;  Location: [facility] OR    DISCECTOMY, FUSION CERVICAL ANTERIOR ONE LEVEL, COMBINED N/A 2/15/2017    Procedure: COMBINED DISCECTOMY, FUSION CERVICAL ANTERIOR ONE LEVEL;  Surgeon: Darren Campos MD;  [facility]    ENDOSCOPIC INSERTION TUBE GASTROSTOMY  9/9/2013    Procedure: ENDOSCOPIC INSERTION TUBE GASTROSTOMY;;  Surgeon: Francis Vyas MD;  [facility]    ENDOSCOPIC ULTRASOUND UPPER GASTROINTESTINAL TRACT (GI)  4/29/2011    Procedure:ENDOSCOPIC ULTRASOUND UPPER GASTROINTESTINAL TRACT (GI); Both Procedures done Conjointly; Surgeon:NEREIDA HOUSER; [facility]    ENDOSCOPIC ULTRASOUND UPPER GASTROINTESTINAL TRACT (GI)  9/9/2013    Procedure: ENDOSCOPIC ULTRASOUND UPPER GASTROINTESTINAL TRACT (GI);  Endoscopic Ultrasound Guide Gastrostomy Tube Placement  C-arm;  Surgeon: Noe Lizarraga MD;  [facility]    ENDOSCOPIC ULTRASOUND UPPER GASTROINTESTINAL TRACT (GI) N/A 2/24/2021    Procedure: ENDOSCOPIC ULTRASOUND, ESOPHAGOSCOPY / UPPER GASTROINTESTINAL TRACT (GI), esophagastrogastroduodenoscopy;  Surgeon: Berny Bach MD;  [facility]    ENDOSCOPY  03/25/11    EGD, [facility]    ENDOSCOPY  08/04/09    Upper Endoscopy, [facility]    ENDOSCOPY  01/05/09    Upper Endoscopy, [facility]    ESOPHAGOSCOPY, GASTROSCOPY, DUODENOSCOPY (EGD), COMBINED  4/20/2011    Procedure:COMBINED ESOPHAGOSCOPY, GASTROSCOPY, DUODENOSCOPY (EGD); Surgeon:FRANCIS VYAS; [facility]    ESOPHAGOSCOPY, GASTROSCOPY, DUODENOSCOPY (EGD), COMBINED  6/15/2011    Procedure:COMBINED  ESOPHAGOSCOPY, GASTROSCOPY, DUODENOSCOPY (EGD); Surgeon:FRANCIS VYAS; [facility]    ESOPHAGOSCOPY, GASTROSCOPY, DUODENOSCOPY (EGD), COMBINED  6/12/2013    Procedure: COMBINED ESOPHAGOSCOPY, GASTROSCOPY, DUODENOSCOPY (EGD);;  Surgeon: Francis Vyas MD;  [facility]    ESOPHAGOSCOPY, GASTROSCOPY, DUODENOSCOPY (EGD), COMBINED  11/22/2013    Procedure: COMBINED ESOPHAGOSCOPY, GASTROSCOPY, DUODENOSCOPY (EGD);;  Surgeon: Francis Vyas MD;  [facility]    ESOPHAGOSCOPY, GASTROSCOPY, DUODENOSCOPY (EGD), COMBINED  4/30/2014    Procedure: COMBINED ESOPHAGOSCOPY, GASTROSCOPY, DUODENOSCOPY (EGD);  Surgeon: Francis Vyas MD;  [facility]    ESOPHAGOSCOPY, GASTROSCOPY, DUODENOSCOPY (EGD), COMBINED N/A 2/20/2015    Procedure: COMBINED ESOPHAGOSCOPY, GASTROSCOPY, DUODENOSCOPY (EGD), BIOPSY SINGLE OR MULTIPLE;  Surgeon: Baron Scanlon MD;  Location:  OR    ESOPHAGOSCOPY, GASTROSCOPY, DUODENOSCOPY (EGD), COMBINED N/A 9/30/2015    Procedure: COMBINED ESOPHAGOSCOPY, GASTROSCOPY, DUODENOSCOPY (EGD);  Surgeon: Francis Vyas MD;  Location:  GI    ESOPHAGOSCOPY, GASTROSCOPY, DUODENOSCOPY (EGD), COMBINED N/A 10/3/2019    Procedure: Upper Endoscopy;  Surgeon: Clif Morrow MD;  [facility]    GASTRECTOMY  6/22/2012    Procedure: GASTRECTOMY;  Open Approach, Excise Ulcers,Partial Gastrectomy, Esophagojejunostomy, Hiatal Hernia Repair, Extensive Lysis of Adhesions and Esaphagogastrodudenoscopy.;  Surgeon: Francis Vyas MD;  [facility]    GASTROJEJUNOSTOMY  08/26/09    Extensice enterolysis, partial resect. jejunum, part. resect gastric pouch, gastrojejunostomy anastomosis    HC ESOPH/GAS REFLUX TEST W NASAL IMPED ELECTRODE  8/5/2013    Procedure: ESOPHAGEAL IMPEDENCE FUNCTION TEST 1 HOUR OR LESS;  Surgeon: Halie Lang MD;  Location:  GI    HEAD & NECK SURGERY  2/15/2017    C5-C6    HERNIA REPAIR  2006    Umbilical hernia    HERNIORRHAPHY HIATAL   6/22/2012    Procedure: HERNIORRHAPHY HIATAL;;  Surgeon: Francis Vyas MD;  [facility]    HERNIORRHAPHY INGUINAL  11/22/2013    Procedure: HERNIORRHAPHY INGUINAL;;  Surgeon: Francis Vyas MD;  [facility]    INSERT PICC LINE Right 12/19/2019    Procedure: Picc Placement;  Surgeon: Per Dumont PA-C;  [facility]    INSERT PICC LINE Right 2/21/2020    Procedure: INSERTION, PICC;  Surgeon: Per Dumont PA-C;  [facility]    INSERT PORT VASCULAR ACCESS Right 12/19/2017    Procedure: INSERT PORT VASCULAR ACCESS;  Right Chest Port Placement ;  Surgeon: Lisandro Alejandro PA-C;  [facility]    INSERT PORT VASCULAR ACCESS Right 8/2/2018    Procedure: INSERT PORT VASCULAR ACCESS;  Place single lumen tunneled central venous access catheter;  Surgeon: Guy Jamil PA-C;  [facility]    IR CVC TUNNEL PLACEMENT > 5 YRS OF AGE  8/7/2019    IR CVC TUNNEL PLACEMENT > 5 YRS OF AGE  4/14/2020    IR CVC TUNNEL PLACEMENT > 5 YRS OF AGE  8/3/2020    IR CVC TUNNEL PLACEMENT > 5 YRS OF AGE  9/4/2020    IR CVC TUNNEL PLACEMENT > 5 YRS OF AGE  2/5/2021    IR CVC TUNNEL PLACEMENT > 5 YRS OF AGE  3/23/2021    IR CVC TUNNEL PLACEMENT > 5 YRS OF AGE  1/13/2022    IR CVC TUNNEL PLACEMENT > 5 YRS OF AGE  5/12/2022    IR CVC TUNNEL PLACEMENT > 5 YRS OF AGE  7/13/2022    IR CVC TUNNEL PLACEMENT > 5 YRS OF AGE  7/10/2023    IR CVC TUNNEL PLACEMENT > 5 YRS OF AGE  11/17/2023    IR CVC TUNNEL PLACEMENT > 5 YRS OF AGE  1/25/2024    IR CVC TUNNEL PLACEMENT > 5 YRS OF AGE  6/19/2024    IR CVC TUNNEL REMOVAL LEFT  6/7/2023    IR CVC TUNNEL REMOVAL LEFT  11/14/2023    IR CVC TUNNEL REMOVAL LEFT  1/22/2024    IR CVC TUNNEL REMOVAL LEFT  9/5/2024    IR CVC TUNNEL REMOVAL RIGHT  10/1/2019    IR CVC TUNNEL REMOVAL RIGHT  7/30/2020    IR CVC TUNNEL REMOVAL RIGHT  9/2/2020    IR CVC TUNNEL REMOVAL RIGHT  2/3/2021    IR CVC TUNNEL REMOVAL RIGHT  3/19/2021    IR CVC TUNNEL REMOVAL RIGHT   1/10/2022    IR CVC TUNNEL REMOVAL RIGHT  5/9/2022    IR CVC TUNNEL REMOVAL RIGHT  7/8/2022    IR CVC TUNNEL REMOVAL RIGHT  6/17/2024    IR CVC TUNNEL REVISION LEFT  8/10/2022    IR CVC TUNNEL REVISION LEFT  9/19/2023    IR CVC TUNNEL REVISION LEFT  8/12/2024    IR CVC TUNNEL REVISION RIGHT  5/7/2021    IR FOLLOW UP VISIT OUTPATIENT  8/7/2019    IR PICC EXCHANGE LEFT  6/8/2023    IR PICC PLACEMENT > 5 YRS OF AGE  3/7/2019    IR PICC PLACEMENT > 5 YRS OF AGE  12/19/2019    IR PICC PLACEMENT > 5 YRS OF AGE  2/21/2020    IR PICC PLACEMENT > 5 YRS OF AGE  6/7/2023    LAPAROTOMY EXPLORATORY  11/22/2013    Procedure: LAPAROTOMY EXPLORATORY;  Exploratory Laparotomy, Upper Endoscopy, Left Inguinal Hernia Repair;  Surgeon: Francis Vyas MD;  [facility]    LAPAROTOMY EXPLORATORY N/A 9/30/2023    Procedure: Laparotomy exploratory, reduction of intussusception, resection of small bowel with primary anastamosis, upper endoscopy;  Surgeon: Delvis Mercado MD;  [facility]    ORTHOPEDIC SURGERY      PICC INSERTION Right 03/16/2017    5fr DL BioFlo PICC, 42cm (3cm external) in the R medial brachial vein w/ tip in the SVC RA junction.    PICC INSERTION Left 09/23/2017    5fr DL BioFlo PICC, 45cm (1cm external) in the L basilic vein w/ tip in the SVC RA junction.    PICC INSERTION Right 05/16/2019    5Fr - 43cm, Medial brachial vein, low SVC    PICC INSERTION Right 10/02/2019    5Fr - 43cm (2cm external), basilic vein, low SVC    SHAYLEE EN Y BOWEL  2003    SOFT TISSUE SURGERY      THORACIC SURGERY      TONSILLECTOMY      TRANSESOPHAGEAL ECHOCARDIOGRAM INTRAOPERATIVE N/A 1/8/2019    Procedure: TRANSESOPHAGEAL ECHOCARDIOGRAM INTRAOPERATIVE;  Surgeon: GENERIC ANESTHESIA PROVIDER;  [facility]    TRANSESOPHAGEAL ECHOCARDIOGRAM INTRAOPERATIVE N/A 7/7/2023    Procedure: Transesophageal echocardiogram in the OR;  Surgeon: GENERIC ANESTHESIA PROVIDER;  [facility]    TRANSESOPHAGEAL ECHOCARDIOGRAM INTRAOPERATIVE  N/A 11/17/2023    Procedure: ECHOCARDIOGRAM,TRANSESOPHAGEAL,WITH ANESTHESIA;  Surgeon: GENERIC ANESTHESIA PROVIDER;  [facility]    [facility] GASTRIC BYPASS,OBESE<100CM SHAYLEE-EN-Y  2002    lost 300 pounds         Allergies:      Allergies   Allergen Reactions    Bactrim [Sulfamethoxazole-Trimethoprim] Rash    Penicillins Anaphylaxis     Please see Antimicrobial Management Team allergy assessment note 10/10/2018. Patient reported tolerating amoxicillin.  Tolerating cefepime and ceftriaxone without reaction 6/23    Ertapenem Nausea and Vomiting    Doxycycline Rash    Vancomycin Rash     Rash after receiving vancomycin 3/28/16 (infusion reaction?). Tolerated with slower infusion and diphenhydramine premed.  Tolerated 1250mg over 90minutes 7/2023.            Current Antimicrobials:     Ceftriaxone 9/4- present       Family History:     Family History   Problem Relation Age of Onset    Gastrointestinal Disease Mother         Crohns disease    Anxiety Disorder Mother     Thyroid Disease Mother         Grave's disease    Cancer Father         ear cancer-skin cancer/melanoma    Breast Cancer Maternal Grandmother     Macular Degeneration Maternal Grandfather     Anxiety Disorder Sister     Diabetes Maternal Uncle     Breast Cancer Other     Hypertension No family hx of     Hyperlipidemia No family hx of     Cerebrovascular Disease No family hx of     Prostate Cancer No family hx of     Depression No family hx of     Anesthesia Reaction No family hx of     Asthma No family hx of     Osteoporosis No family hx of     Genetic Disorder No family hx of     Obesity No family hx of     Mental Illness No family hx of     Substance Abuse No family hx of     Glaucoma No family hx of             Social History:     Social History     Socioeconomic History    Marital status:      Spouse name: Rose    Number of children: 1    Years of education: Not on file    Highest education level: GED or equivalent   Occupational History    Not  on file   Tobacco Use    Smoking status: Light Smoker     Current packs/day: 0.50     Average packs/day: 0.5 packs/day for 3.0 years (1.5 ttl pk-yrs)     Types: Cigarettes    Smokeless tobacco: Never    Tobacco comments:     8/8/2024  smokes 3-5 cigarettes/day   Vaping Use    Vaping status: Never Used   Substance and Sexual Activity    Alcohol use: No     Comment: quit 2002    Drug use: No    Sexual activity: Yes     Partners: Female     Birth control/protection: None     Comment: no protection   Other Topics Concern    Parent/sibling w/ CABG, MI or angioplasty before 65F 55M? No   Social History Narrative    Not on file     Social Determinants of Health     Financial Resource Strain: Low Risk  (9/4/2024)    Financial Resource Strain     Within the past 12 months, have you or your family members you live with been unable to get utilities (heat, electricity) when it was really needed?: No   Food Insecurity: Low Risk  (9/4/2024)    Food Insecurity     Within the past 12 months, did you worry that your food would run out before you got money to buy more?: No     Within the past 12 months, did the food you bought just not last and you didn t have money to get more?: No   Transportation Needs: Low Risk  (9/4/2024)    Transportation Needs     Within the past 12 months, has lack of transportation kept you from medical appointments, getting your medicines, non-medical meetings or appointments, work, or from getting things that you need?: No   Physical Activity: Unknown (8/4/2020)    Exercise Vital Sign     Days of Exercise per Week: 2 days     Minutes of Exercise per Session: Not on file   Stress: No Stress Concern Present (8/4/2020)    [employer] - Occupational Stress Questionnaire     Feeling of Stress : Only a little   Social Connections: Unknown (1/1/2022)    [facility]    Social Connections      Frequency of Communication with Friends and Family: Not on file   Interpersonal Safety: Low Risk  (9/4/2024)    Interpersonal Safety     Do you feel physically and emotionally safe where you currently live?: Yes     Within the past 12 months, have you been hit, slapped, kicked or otherwise physically hurt by someone?: No     Within the past 12 months, have you been humiliated or emotionally abused in other ways by your partner or ex-partner?: No   Housing Stability: Low Risk  (9/4/2024)    Housing Stability     Do you have housing? : Yes     Are you worried about losing your housing?: No              Physical Exam:   Ranges forvital signs:  Temp:  [98.7  F (37.1  C)] 98.7  F (37.1  C)  Resp:  [18] 18  BP: (116)/(79) 116/79  No intake or output data in the 24 hours ending 09/04/24 1427    Exam:  GENERAL:  well-developed, well-nourished, joking demeanor  ENT:  Head is normocephalic, atraumatic. Oropharynx is moist without exudates or ulcers.  EYES:  Eyes have anicteric sclerae.    NECK:  Supple.  LUNGS: breathing comfortably on room air.  EXT: Extremities warm and without edema.  SKIN:  No acute rashes.  Cm catheter removed.  NEUROLOGIC:  Grossly nonfocal.         Laboratory Data:   Reviewed.  Pertinent for:    WBC 6.7  LA 0.5    Culture data:    9/4/24   Blood culture ngtd    BLOOD CULTURE 8/31/24  Specimen: Blood - Central Line  Component 3 d ago   CULTURE RESULT Panic    CULTURE Aerobic and Anaerobic Bottles growing Escherichia coli   Resulting Agency [facility]   Susceptibility    Organism Antibiotic Susceptibility   Escherichia coli TRIMETHOPRIM/SULF <=1/19: S   Escherichia coli AMPICILLIN 4: S   Escherichia coli CEFAZOLIN 2: S   Escherichia coli GENTAMICIN <=1: S   Escherichia coli CEFTRIAXONE <=0.25: S   Escherichia coli CEFTAZIDIME <=0.5: S   Escherichia coli LEVOFLOXACIN <=0.12: S   Escherichia coli CIPROFLOXACIN <=0.06: S   Escherichia coli PIPERACILLIN/TAZO <=4: S    Escherichia coli AMPICILLIN/SULBACTAM <=2: S   Escherichia coli CEFEPIME <=0.12: S   Escherichia coli MEROPENEM <=0.25: S            Imagin/12/24 IR note showing placement of catheter  Completed image-guided placement of 5 Syriac, 33 cm single lumen tunneled central venous catheter via left internal jugular/innominate confluence. Aspirates and flushes freely, heparin locked and ready for immediate use. Tip in high right atrium.

## 2024-09-06 NOTE — PLAN OF CARE
"Goal Outcome Evaluation:  Blood pressure 122/79, pulse 52, temperature 98.5  F (36.9  C), temperature source Oral, resp. rate 18, weight 88.8 kg (195 lb 11.2 oz), SpO2 99%.  Pt A&OX4 Co pain managed using PO oxycodone and pt has Fentanyl patch in place. Pt is on RA no SOB noted. Independent in his room and in the hallway. Pt went out for smoking X2 New PIV infusing TPN patent. Pt self discontinue his PIV X1. Pt has specific way to take his medication in a certain time. Pharmacy notified. TPN infused late due to pt requested (smoking time) Bowel and bladder continent.       Plan of Care Reviewed With: patient    Problem: Adult Inpatient Plan of Care  Goal: Plan of Care Review  Description: The Plan of Care Review/Shift note should be completed every shift.  The Outcome Evaluation is a brief statement about your assessment that the patient is improving, declining, or no change.  This information will be displayed automatically on your shift  note.  Outcome: Progressing  Flowsheets (Taken 9/6/2024 0601)  Plan of Care Reviewed With: patient  Goal: Patient-Specific Goal (Individualized)  Description: You can add care plan individualizations to a care plan. Examples of Individualization might be:  \"Parent requests to be called daily at 9am for status\", \"I have a hard time hearing out of my right ear\", or \"Do not touch me to wake me up as it startles  me\".  Outcome: Progressing  Goal: Absence of Hospital-Acquired Illness or Injury  Outcome: Progressing  Goal: Optimal Comfort and Wellbeing  Outcome: Progressing  Goal: Readiness for Transition of Care  Outcome: Progressing                  "

## 2024-09-07 LAB
ANION GAP SERPL CALCULATED.3IONS-SCNC: 9 MMOL/L (ref 7–15)
BUN SERPL-MCNC: 12.1 MG/DL (ref 6–20)
CALCIUM SERPL-MCNC: 8.3 MG/DL (ref 8.8–10.4)
CHLORIDE SERPL-SCNC: 109 MMOL/L (ref 98–107)
CREAT SERPL-MCNC: 0.69 MG/DL (ref 0.67–1.17)
EGFRCR SERPLBLD CKD-EPI 2021: >90 ML/MIN/1.73M2
ERYTHROCYTE [DISTWIDTH] IN BLOOD BY AUTOMATED COUNT: 16.7 % (ref 10–15)
GLUCOSE SERPL-MCNC: 79 MG/DL (ref 70–99)
HCO3 SERPL-SCNC: 24 MMOL/L (ref 22–29)
HCT VFR BLD AUTO: 37.4 % (ref 40–53)
HGB BLD-MCNC: 11.8 G/DL (ref 13.3–17.7)
MAGNESIUM SERPL-MCNC: 2 MG/DL (ref 1.7–2.3)
MCH RBC QN AUTO: 29.1 PG (ref 26.5–33)
MCHC RBC AUTO-ENTMCNC: 31.6 G/DL (ref 31.5–36.5)
MCV RBC AUTO: 92 FL (ref 78–100)
PHOSPHATE SERPL-MCNC: 3.3 MG/DL (ref 2.5–4.5)
PLATELET # BLD AUTO: 266 10E3/UL (ref 150–450)
POTASSIUM SERPL-SCNC: 4.3 MMOL/L (ref 3.4–5.3)
RBC # BLD AUTO: 4.06 10E6/UL (ref 4.4–5.9)
SODIUM SERPL-SCNC: 142 MMOL/L (ref 135–145)
WBC # BLD AUTO: 4.9 10E3/UL (ref 4–11)

## 2024-09-07 PROCEDURE — 250N000011 HC RX IP 250 OP 636: Performed by: EMERGENCY MEDICINE

## 2024-09-07 PROCEDURE — 85048 AUTOMATED LEUKOCYTE COUNT: CPT | Performed by: STUDENT IN AN ORGANIZED HEALTH CARE EDUCATION/TRAINING PROGRAM

## 2024-09-07 PROCEDURE — 84100 ASSAY OF PHOSPHORUS: CPT | Performed by: STUDENT IN AN ORGANIZED HEALTH CARE EDUCATION/TRAINING PROGRAM

## 2024-09-07 PROCEDURE — 80048 BASIC METABOLIC PNL TOTAL CA: CPT | Performed by: STUDENT IN AN ORGANIZED HEALTH CARE EDUCATION/TRAINING PROGRAM

## 2024-09-07 PROCEDURE — 250N000011 HC RX IP 250 OP 636: Performed by: NURSE PRACTITIONER

## 2024-09-07 PROCEDURE — 120N000002 HC R&B MED SURG/OB UMMC

## 2024-09-07 PROCEDURE — 258N000003 HC RX IP 258 OP 636: Performed by: STUDENT IN AN ORGANIZED HEALTH CARE EDUCATION/TRAINING PROGRAM

## 2024-09-07 PROCEDURE — 250N000009 HC RX 250: Performed by: STUDENT IN AN ORGANIZED HEALTH CARE EDUCATION/TRAINING PROGRAM

## 2024-09-07 PROCEDURE — 83735 ASSAY OF MAGNESIUM: CPT | Performed by: STUDENT IN AN ORGANIZED HEALTH CARE EDUCATION/TRAINING PROGRAM

## 2024-09-07 PROCEDURE — 36415 COLL VENOUS BLD VENIPUNCTURE: CPT | Performed by: STUDENT IN AN ORGANIZED HEALTH CARE EDUCATION/TRAINING PROGRAM

## 2024-09-07 PROCEDURE — 250N000013 HC RX MED GY IP 250 OP 250 PS 637: Performed by: NURSE PRACTITIONER

## 2024-09-07 PROCEDURE — 99232 SBSQ HOSP IP/OBS MODERATE 35: CPT | Performed by: STUDENT IN AN ORGANIZED HEALTH CARE EDUCATION/TRAINING PROGRAM

## 2024-09-07 RX ORDER — SODIUM CHLORIDE 9 MG/ML
INJECTION, SOLUTION INTRAVENOUS
Status: COMPLETED
Start: 2024-09-07 | End: 2024-09-07

## 2024-09-07 RX ADMIN — DEXTROAMPHETAMINE SACCHARATE, AMPHETAMINE ASPARTATE, DEXTROAMPHETAMINE SULFATE AND AMPHETAMINE SULFATE 20 MG: 5; 5; 5; 5 TABLET ORAL at 10:44

## 2024-09-07 RX ADMIN — ENOXAPARIN SODIUM 80 MG: 80 INJECTION SUBCUTANEOUS at 22:19

## 2024-09-07 RX ADMIN — SODIUM CHLORIDE 500 ML: 9 INJECTION, SOLUTION INTRAVENOUS at 22:27

## 2024-09-07 RX ADMIN — OXYCODONE HYDROCHLORIDE 10 MG: 5 SOLUTION ORAL at 18:40

## 2024-09-07 RX ADMIN — CARVEDILOL 12.5 MG: 12.5 TABLET, FILM COATED ORAL at 10:44

## 2024-09-07 RX ADMIN — OXYCODONE HYDROCHLORIDE 10 MG: 5 SOLUTION ORAL at 22:19

## 2024-09-07 RX ADMIN — ALPRAZOLAM 0.5 MG: 0.25 TABLET ORAL at 22:19

## 2024-09-07 RX ADMIN — CARVEDILOL 12.5 MG: 12.5 TABLET, FILM COATED ORAL at 18:39

## 2024-09-07 RX ADMIN — MAGNESIUM SULFATE HEPTAHYDRATE: 500 INJECTION, SOLUTION INTRAMUSCULAR; INTRAVENOUS at 23:45

## 2024-09-07 RX ADMIN — ENOXAPARIN SODIUM 80 MG: 80 INJECTION SUBCUTANEOUS at 10:44

## 2024-09-07 RX ADMIN — OXYCODONE HYDROCHLORIDE 10 MG: 5 SOLUTION ORAL at 10:44

## 2024-09-07 RX ADMIN — CEFTRIAXONE SODIUM 2 G: 2 INJECTION, POWDER, FOR SOLUTION INTRAMUSCULAR; INTRAVENOUS at 22:21

## 2024-09-07 ASSESSMENT — ACTIVITIES OF DAILY LIVING (ADL)
ADLS_ACUITY_SCORE: 28

## 2024-09-07 NOTE — CONSULTS
Patient has an Long PIV (Extended Dwell) in the Left lower forearm. Treat like a traditional PIV.  No blood pressures or lab draws above Long PIV line. May draw labs from catheter. Use a tourniquet high above insertion site and the smallest syringe size possible to draw waste and obtain sample. Flush with 5-10ml NS after sample obtained using pulsatile, push-pause method in order to clear line. Please place a 'Nurse to Consult Vascular Access' order if needing assistance.

## 2024-09-07 NOTE — PROGRESS NOTES
[facility]    Medicine Progress Note - Hospitalist Service, GOLD TEAM 21    Date of Admission:  9/3/2024    Assessment & Plan      Parker Acevedo is a 53 year old male admitted on 9/3/2024. He has a past medical history of Celestino-en-Y gastric bypass, malnutrition, chronic TPN, chronic pain, paroxysmal afib, anemia, ADHD and DVT. He presents to the ED due to positive blood culture at clinic.    Today  - ctn ceftriaxone 9/6 - 9/12    # CLABSI, prior to admit  # Indwelling Cm catheter for TPN, removed  He has had many prior admissions for CLABSI, last admitted 6/14/24 to 6/19/24 for line infection with methicillin sensitive staph epidermidis. He was seen at his clinic due to fevers, blood culture was done and came back positive for E coli. He was told to go to the ER. WBC 6.6, lactic acid 0.5. He was give a 1000 ml bolus and started on rocephin  - ID consulted   - continue ceftriaxone X7 days from negative blood culture (9/4-9/10)  - Cultures   - Bcx 9/4: NGTD   - Catheter tip Cx 9/5 with Ecoli    - Bcx 9/6: NGTD  - Cm removed on 9/5 for 48-72 hour line holiday   - IR to be consulted on 9/9 for replacement of CVC    # History of obesity, s/p Celestino-en-Y gastric bypass  # Short gut syndrome  # GERD  He has a J-tube for venting. He attempted tube feedings years ago but was unable to absorb it due to short gut syndrome. He is now maintenance on daily TPN and 3 times a week lipids. He eats about 300 calories daily  - Pharmacy consult for TPN/lipids  - Zofran PRN  - PTA Carafate  - Regular diet    # Anemia  Hgb has been 11-13 and was 11.4 on admitted.   - monitor    # Chronic pain   - Continue fentanyl patch   - PTA oxycodone    # ADHD   - PTA amphetamine-dextroamphetamine     # Paroxysmal Afib  - Continue carvedilol 12.5mg BID    # DVT   Non occlusive deep venous thrombus is seen within the mid and proximal right subclavian vein and right internal jugular vein 7/4/23    -Continue enoxaparin as prescribed PTA           Diet: Regular Diet Adult  parenteral nutrition - ADULT compounded formula  parenteral nutrition - ADULT compounded formula    DVT Prophylaxis: Enoxaparin (Lovenox) SQ  Sanchez Catheter: Not present  Lines: None       Cardiac Monitoring: None  Code Status: Full Code      Clinically Significant Risk Factors                             # Moderate Malnutrition: based on nutrition assessment, PRESENT ON ADMISSION     # Financial/Environmental Concerns: none               Disposition Plan     Medically Ready for Discharge: Anticipated in 2-4 Days             [facility] Service, GOLD TEAM 21  [facility]  Securely message with [employer] (more info)  Text page via [employer] Paging/Directory   See signed in provider for up to date coverage information  ______________________________________________________________________    Interval History   Parker is doing well today without new symptoms. States that he is thinking about not putting his line back in, reports that he is tired of being in the hospital and having these infections happen frequently. Also notes his wife has completed training to help him requiring less at home nursing which would be a positive. He will continue to think about this over the next 24 hours.     Physical Exam   Vital Signs: Temp: 98.6  F (37  C) Temp src: Oral BP: 106/71     Resp: 18        Weight: 195 lbs 11.2 oz    General: well appearing, awake  Resp: breathing comfortable on room air  Neuro: alert, oriented, normal gait    Medical Decision Making             Data     I have personally reviewed the following data over the past 24 hrs:    4.9  \   11.8 (L)   / 266     142 109 (H) 12.1 /  79   4.3 24 0.69 \       Imaging results reviewed over the past 24 hrs:   No results found for this or any previous visit (from the past 24 hour(s)).

## 2024-09-07 NOTE — PLAN OF CARE
Goal Outcome Evaluation:      Plan of Care Reviewed With: patient    Overall Patient Progress: no change    Outcome Evaluation: Pt more willing to stay connected to TPN today. Was able to get better sleep last night.    VS:       Pt A/O X 4. Afebrile. VSS./71 (BP Location: Left arm)   Pulse 52   Temp 98.6  F (37  C) (Oral)   Resp 18   Wt 88.8 kg (195 lb 11.2 oz)   SpO2 99%   BMI 28.08 kg/m    Lungs- clear bilaterally with both anterior and posterior. Denies nausea, shortness of breath, and chest pain.     Output:       Bowels- active in all four quadrants. Voids spontaneously without difficulty in the bathroom.      Activity:       Pt up independently.     Skin:   Skin intact.     Pain:       Has pain in the abdomin and given oxycodone and tylenol, and is tolerating well.      CMS:       CMS and Neuro's are intact. Denies numbness and tingling in all extremities.      Dressing:       No incisional dressing.      Diet:       Pt is on a regular/TPN diet and appetite was poor this shift.       LDA:       PIV is patent in the L lower forearm and infusing TPN 83 ml/hr.      Equipment:       Pt belongings in the room. Bilateral heels are elevated off the bed.     Plan:       Pt is able to make needs known and the call light is within the pt's reach. Continue to monitor.       Additional Info:

## 2024-09-07 NOTE — PLAN OF CARE
"VS: /71 (BP Location: Left arm)   Pulse 52   Temp 98.6  F (37  C) (Oral)   Resp 18   Wt 88.8 kg (195 lb 11.2 oz)   SpO2 99%   BMI 28.08 kg/m      O2: Stable on room air   Neuro: AOx4   Bowel/Bladder: Continent, IND B/B   LBM: 8/28   Diet: regular   Skin: intact   Pain: Pain managed with PRN medications   Activity: Up ad vitor, IND.    Dressings: NA   LDA: L ext. PIV, TPN running @ 83ml/hr. J tube   Equipment: IV pole   Plan: Continue with plan of care   Additional Info: TPN paused for IV ABX only. Patient disconnected themselves from TPN for 15 minutes this am to go outside, when returned TPN reconnected.         Patti Palomino RN on 9/7/2024 at 2:49 AM     Goal Outcome Evaluation:      Plan of Care Reviewed With: patient    Overall Patient Progress: no changeOverall Patient Progress: no change    Outcome Evaluation: extended PIV placed by vascular, reinforced need to stay connected to TPN, has not removed PIV this shift. VSS, no SOB.    Problem: Adult Inpatient Plan of Care  Goal: Plan of Care Review  Description: The Plan of Care Review/Shift note should be completed every shift.  The Outcome Evaluation is a brief statement about your assessment that the patient is improving, declining, or no change.  This information will be displayed automatically on your shift  note.  Outcome: Progressing  Flowsheets (Taken 9/7/2024 0247)  Outcome Evaluation: extended PIV placed by vascular, reinforced need to stay connected to TPN, has not removed PIV this shift. VSS, no SOB.  Plan of Care Reviewed With: patient  Overall Patient Progress: no change  Goal: Patient-Specific Goal (Individualized)  Description: You can add care plan individualizations to a care plan. Examples of Individualization might be:  \"Parent requests to be called daily at 9am for status\", \"I have a hard time hearing out of my right ear\", or \"Do not touch me to wake me up as it startles  me\".  Outcome: Progressing  Goal: Absence of " Hospital-Acquired Illness or Injury  Outcome: Progressing  Intervention: Identify and Manage Fall Risk  Recent Flowsheet Documentation  Taken 9/6/2024 2300 by Patti Palomino RN  Safety Promotion/Fall Prevention: room door open  Intervention: Prevent Skin Injury  Recent Flowsheet Documentation  Taken 9/6/2024 2300 by Patti Palomino RN  Body Position: position changed independently  Intervention: Prevent and Manage VTE (Venous Thromboembolism) Risk  Recent Flowsheet Documentation  Taken 9/6/2024 2300 by Patti Palomino RN  VTE Prevention/Management: compression stockings off  Intervention: Prevent Infection  Recent Flowsheet Documentation  Taken 9/6/2024 2300 by Patti Palomino RN  Infection Prevention: rest/sleep promoted  Goal: Optimal Comfort and Wellbeing  Outcome: Progressing  Goal: Readiness for Transition of Care  Outcome: Progressing     Problem: Pain Acute  Goal: Optimal Pain Control and Function  Outcome: Progressing  Intervention: Prevent or Manage Pain  Recent Flowsheet Documentation  Taken 9/6/2024 2300 by Patti Palomino RN  Medication Review/Management: medications reviewed     Problem: Infection  Goal: Absence of Infection Signs and Symptoms  Outcome: Progressing

## 2024-09-08 LAB
BACTERIA SPEC CULT: ABNORMAL
GLUCOSE BLDC GLUCOMTR-MCNC: 107 MG/DL (ref 70–99)
GRAM STAIN RESULT: ABNORMAL

## 2024-09-08 PROCEDURE — 99221 1ST HOSP IP/OBS SF/LOW 40: CPT | Performed by: STUDENT IN AN ORGANIZED HEALTH CARE EDUCATION/TRAINING PROGRAM

## 2024-09-08 PROCEDURE — 250N000013 HC RX MED GY IP 250 OP 250 PS 637: Performed by: NURSE PRACTITIONER

## 2024-09-08 PROCEDURE — 250N000011 HC RX IP 250 OP 636: Performed by: NURSE PRACTITIONER

## 2024-09-08 PROCEDURE — 99232 SBSQ HOSP IP/OBS MODERATE 35: CPT | Performed by: STUDENT IN AN ORGANIZED HEALTH CARE EDUCATION/TRAINING PROGRAM

## 2024-09-08 PROCEDURE — 250N000011 HC RX IP 250 OP 636: Performed by: EMERGENCY MEDICINE

## 2024-09-08 PROCEDURE — 120N000002 HC R&B MED SURG/OB UMMC

## 2024-09-08 RX ADMIN — ONDANSETRON 4 MG: 4 TABLET, ORALLY DISINTEGRATING ORAL at 15:55

## 2024-09-08 RX ADMIN — OXYCODONE HYDROCHLORIDE 10 MG: 5 SOLUTION ORAL at 22:06

## 2024-09-08 RX ADMIN — ALPRAZOLAM 0.5 MG: 0.25 TABLET ORAL at 22:07

## 2024-09-08 RX ADMIN — ALPRAZOLAM 0.5 MG: 0.25 TABLET ORAL at 09:11

## 2024-09-08 RX ADMIN — DEXTROAMPHETAMINE SACCHARATE, AMPHETAMINE ASPARTATE, DEXTROAMPHETAMINE SULFATE AND AMPHETAMINE SULFATE 20 MG: 5; 5; 5; 5 TABLET ORAL at 09:11

## 2024-09-08 RX ADMIN — OXYCODONE HYDROCHLORIDE 10 MG: 5 SOLUTION ORAL at 05:07

## 2024-09-08 RX ADMIN — OXYCODONE HYDROCHLORIDE 10 MG: 5 SOLUTION ORAL at 15:55

## 2024-09-08 RX ADMIN — ONDANSETRON 4 MG: 4 TABLET, ORALLY DISINTEGRATING ORAL at 05:07

## 2024-09-08 RX ADMIN — CARVEDILOL 12.5 MG: 12.5 TABLET, FILM COATED ORAL at 09:11

## 2024-09-08 RX ADMIN — CARVEDILOL 12.5 MG: 12.5 TABLET, FILM COATED ORAL at 18:26

## 2024-09-08 RX ADMIN — ENOXAPARIN SODIUM 80 MG: 80 INJECTION SUBCUTANEOUS at 09:12

## 2024-09-08 RX ADMIN — FENTANYL 1 PATCH: 25 PATCH TRANSDERMAL at 22:07

## 2024-09-08 RX ADMIN — OXYCODONE HYDROCHLORIDE 10 MG: 5 SOLUTION ORAL at 09:11

## 2024-09-08 RX ADMIN — CEFTRIAXONE SODIUM 2 G: 2 INJECTION, POWDER, FOR SOLUTION INTRAMUSCULAR; INTRAVENOUS at 22:48

## 2024-09-08 ASSESSMENT — ACTIVITIES OF DAILY LIVING (ADL)
ADLS_ACUITY_SCORE: 28

## 2024-09-08 NOTE — PLAN OF CARE
Goal Outcome Evaluation:      Plan of Care Reviewed With: patient    Overall Patient Progress: improving    [id number]  Patient is alert and oriented x4. Able to make needs known. On RA stable. Denies N/V, numbness/tingling or CP. Pt up independently. Extended dwell PIV L infusing TPN @83 ml/hr continuous. Voiding spontaneously with no difficulty. Ate 100% of regular diet. Bed in low position. Call light within reach. Continue with POC.

## 2024-09-08 NOTE — PROGRESS NOTES
[id number]     VS: /73 (BP Location: Left arm, Patient Position: Sitting, Cuff Size: Adult Regular)   Pulse 60   Temp 98.5  F (36.9  C) (Oral)   Resp 18   Wt 88.8 kg (195 lb 11.2 oz)   SpO2 97%   BMI 28.08 kg/m     O2: Stable room air   Output: Continent of bowel and bladder, voids spontaneously w/o difficulty   Last BM: 2 weeks ago per patient report   Activity: independent   Skin: Refused skin assessment, visible skin is intact   Pain: PRN meds given. See MARS              CMS: A&O x4  denies chest pain, n/v, numbness/tingling, and dizziness   Dressing: None   Diet: regular   LDA: PIV infusing TPN   Equipment: Personal belongings   Plan: Call light in reach, continue POC   Additional Info:  pt goes occasionally outside for a smoke. Fetanyl patch in place

## 2024-09-08 NOTE — PLAN OF CARE
"Goal Outcome Evaluation:  Blood pressure (!) 118/92, pulse 74, temperature 98.7  F (37.1  C), temperature source Oral, resp. rate 16, weight 88.8 kg (195 lb 11.2 oz), SpO2 99%.  Pt A&OX4 no numbness and tingling. Mood good. Co pain 5-7/10 Pt is on RA no SOB noted. Independent in his room and in the hallway. Pt went out for smokingX3. Constipation. Skin dry and intact. Pt self managed J tube drainage to release abdominal pressure. PIV infusing patent WNL PRN Zofran and oxycodone.      Plan of Care Reviewed With: patient    Problem: Adult Inpatient Plan of Care  Goal: Plan of Care Review  Description: The Plan of Care Review/Shift note should be completed every shift.  The Outcome Evaluation is a brief statement about your assessment that the patient is improving, declining, or no change.  This information will be displayed automatically on your shift  note.  Outcome: Progressing  Flowsheets (Taken 9/8/2024 0225)  Plan of Care Reviewed With: patient  Goal: Patient-Specific Goal (Individualized)  Description: You can add care plan individualizations to a care plan. Examples of Individualization might be:  \"Parent requests to be called daily at 9am for status\", \"I have a hard time hearing out of my right ear\", or \"Do not touch me to wake me up as it startles  me\".  Outcome: Progressing  Goal: Absence of Hospital-Acquired Illness or Injury  Outcome: Progressing  Goal: Optimal Comfort and Wellbeing  Outcome: Progressing  Goal: Readiness for Transition of Care  Outcome: Progressing     Problem: Pain Acute  Goal: Optimal Pain Control and Function  Outcome: Progressing     Problem: Infection  Goal: Absence of Infection Signs and Symptoms  Outcome: Progressing                  "

## 2024-09-08 NOTE — PLAN OF CARE
Goal Outcome Evaluation:      Patient requested for all medications and TPN to be given after 10;20 pm because he would like to go smoke.     Alert and oriented X4.    PRN Xanax and oxycodone given for pain.     Independent in the room. Ambulates     Left PIV infusing antibiotic.     Continue plan of care

## 2024-09-08 NOTE — PROGRESS NOTES
[facility]    Medicine Progress Note - Hospitalist Service, GOLD TEAM 21    Date of Admission:  9/3/2024    Assessment & Plan      Parker Acevedo is a 53 year old male admitted on 9/3/2024. He has a past medical history of Celestino-en-Y gastric bypass, malnutrition, chronic TPN, chronic pain, paroxysmal afib, anemia, ADHD and DVT. He presents to the ED due to positive blood culture at clinic.    Today  - ctn ceftriaxone 9/6 - 9/12  - IR consulted for CVC placement on 9/9    # CLABSI, prior to admit  # Indwelling Cm catheter for TPN, removed  He has had many prior admissions for CLABSI, last admitted 6/14/24 to 6/19/24 for line infection with methicillin sensitive staph epidermidis. He was seen at his clinic due to fevers, blood culture was done and came back positive for E coli. He was told to go to the ER. WBC 6.6, lactic acid 0.5. He was give a 1000 ml bolus and started on rocephin  - ID consulted   - continue ceftriaxone X7 days from negative blood culture (9/4-9/10)  - Cultures   - Bcx 9/4: NGTD   - Catheter tip Cx 9/5 with Ecoli    - Bcx 9/6: NGTD  - Cm removed on 9/5 for 48-72 hour line holiday   - IR to be consulted on 9/9 for replacement of CVC    # History of obesity, s/p Celestino-en-Y gastric bypass  # Short gut syndrome  # GERD  He has a J-tube for venting. He attempted tube feedings years ago but was unable to absorb it due to short gut syndrome. He is now maintenance on daily TPN and 3 times a week lipids. He eats about 300 calories daily  - Pharmacy consult for TPN/lipids  - Zofran PRN  - PTA Carafate  - Regular diet    # Anemia  Hgb has been 11-13 and was 11.4 on admitted.   - monitor    # Chronic pain   - Continue fentanyl patch   - PTA oxycodone    # ADHD   - PTA amphetamine-dextroamphetamine     # Paroxysmal Afib  - Continue carvedilol 12.5mg BID    # DVT   Non occlusive deep venous thrombus is seen within the mid and proximal right subclavian vein  and right internal jugular vein 7/4/23   -Continue enoxaparin as prescribed PTA           Diet: Regular Diet Adult  parenteral nutrition - ADULT compounded formula  parenteral nutrition - ADULT compounded formula  NPO per Anesthesia Guidelines for Procedure/Surgery Except for: Meds, Ice Chips    DVT Prophylaxis: Enoxaparin (Lovenox) SQ  Sanchez Catheter: Not present  Lines: None       Cardiac Monitoring: None  Code Status: Full Code      Clinically Significant Risk Factors                             # Moderate Malnutrition: based on nutrition assessment      # Financial/Environmental Concerns: none               Disposition Plan     Medically Ready for Discharge: Anticipated in 2-4 Days             [facility]  [facility]  Securely message with [employer] (more info)  Text page via [employer] Paging/Directory   See signed in provider for up to date coverage information  ______________________________________________________________________    Interval History   Parker continues to do well. He is looking forward to getting the CVC replaced tomorrow and hopefully home on Tuesday. Discussed he will be able to discharge as soon as the abx are delivered to his house. Will work with care management tomorrow.     Physical Exam   Vital Signs: Temp: 98.5  F (36.9  C) Temp src: Oral BP: 104/73 Pulse: 60   Resp: 18 SpO2: 97 % O2 Device: None (Room air)    Weight: 195 lbs 11.2 oz    General: well appearing, awake  Resp: breathing comfortable on room air  Neuro: alert, oriented, normal gait    Medical Decision Making             Data         Imaging results reviewed over the past 24 hrs:   No results found for this or any previous visit (from the past 24 hour(s)).

## 2024-09-08 NOTE — CONSULTS
Interventional Radiology Consult Service Note    Parker is a 54 y/o man admitted on 9/3 due to positive blood cultures at clinic visit prompting removal of his tunneled, cuffed, low-flow, central venous catheter on 9/5. His last blood culture was drawn on 9/6 and remains no growth after 1 day. IR has been consulted for replacement of the tunneled, cuffed, low-flow, single-lumen catheter. His past medical history is notable for Celestino-en-Y gastric bypass, malnutrition requiring chronic TPN, chronic pain, paroxysmal afib, anemia, ADHD and DVT.    Currently, Parker is not a candidate for line replacement as he will need to have 72 hours of blood cultures without growth, which will occur around 5PM on 9/9. If his cultures are free of growth at that time please re-consult IR and the timing of the line replacement will be determined.    Vitals:   /73 (BP Location: Left arm, Patient Position: Sitting, Cuff Size: Adult Regular)   Pulse 60   Temp 98.5  F (36.9  C) (Oral)   Resp 18   Wt 88.8 kg (195 lb 11.2 oz)   SpO2 97%   BMI 28.08 kg/m      Pertinent Labs:     Lab Results   Component Value Date    WBC 4.9 09/07/2024    WBC 6.3 09/06/2024    WBC 6.7 09/04/2024    WBC 6.9 06/29/2021    WBC 8.1 06/14/2021    WBC 7.5 06/09/2021       Lab Results   Component Value Date    HGB 11.8 09/07/2024    HGB 11.4 09/06/2024    HGB 11.0 09/04/2024    HGB 12.1 06/29/2021    HGB 12.0 06/14/2021    HGB 13.3 06/09/2021       Lab Results   Component Value Date     09/07/2024     09/06/2024     09/04/2024     06/29/2021     06/14/2021     06/09/2021       Lab Results   Component Value Date    INR 1.2 (L) 08/12/2024    INR 1.10 01/22/2024    INR 1.07 05/07/2021    PTT 25 09/29/2023    PTT 26 07/22/2019       Lab Results   Component Value Date    POTASSIUM 4.3 09/07/2024    POTASSIUM 3.8 12/13/2022    POTASSIUM 3.5 06/29/2021        Guy Mccullough MD  Interventional Radiology  Pager:  [phone]

## 2024-09-08 NOTE — PROGRESS NOTES
Patient has been educated on potential risks of choosing to leave the unit and that the responsibility for patient well-being will belong to the patient. Pt has been informed that admission to hospital is due to need for medical treatment. Education given to the patient on some of the potential risks included but are not limited to:        - lack of access to nursing intervention        - possible missed appointments with MD, therapies, tests        - possible missed medications, antibiotics, management of IV's     Patient Response:      Patient notified staff prior to leaving unit:

## 2024-09-09 LAB
ANION GAP SERPL CALCULATED.3IONS-SCNC: 8 MMOL/L (ref 7–15)
BACTERIA BLD CULT: NO GROWTH
BUN SERPL-MCNC: 13.5 MG/DL (ref 6–20)
CALCIUM SERPL-MCNC: 8.8 MG/DL (ref 8.8–10.4)
CHLORIDE SERPL-SCNC: 105 MMOL/L (ref 98–107)
CREAT SERPL-MCNC: 0.86 MG/DL (ref 0.67–1.17)
EGFRCR SERPLBLD CKD-EPI 2021: >90 ML/MIN/1.73M2
ERYTHROCYTE [DISTWIDTH] IN BLOOD BY AUTOMATED COUNT: 17.2 % (ref 10–15)
GLUCOSE BLDC GLUCOMTR-MCNC: 103 MG/DL (ref 70–99)
GLUCOSE BLDC GLUCOMTR-MCNC: 104 MG/DL (ref 70–99)
GLUCOSE BLDC GLUCOMTR-MCNC: 116 MG/DL (ref 70–99)
GLUCOSE BLDC GLUCOMTR-MCNC: 84 MG/DL (ref 70–99)
GLUCOSE BLDC GLUCOMTR-MCNC: 84 MG/DL (ref 70–99)
GLUCOSE SERPL-MCNC: 92 MG/DL (ref 70–99)
HCO3 SERPL-SCNC: 27 MMOL/L (ref 22–29)
HCT VFR BLD AUTO: 36 % (ref 40–53)
HGB BLD-MCNC: 11.3 G/DL (ref 13.3–17.7)
MCH RBC QN AUTO: 29.4 PG (ref 26.5–33)
MCHC RBC AUTO-ENTMCNC: 31.4 G/DL (ref 31.5–36.5)
MCV RBC AUTO: 94 FL (ref 78–100)
PLATELET # BLD AUTO: 254 10E3/UL (ref 150–450)
POTASSIUM SERPL-SCNC: 3.9 MMOL/L (ref 3.4–5.3)
RBC # BLD AUTO: 3.84 10E6/UL (ref 4.4–5.9)
SODIUM SERPL-SCNC: 140 MMOL/L (ref 135–145)
WBC # BLD AUTO: 6.2 10E3/UL (ref 4–11)

## 2024-09-09 PROCEDURE — 85027 COMPLETE CBC AUTOMATED: CPT | Performed by: STUDENT IN AN ORGANIZED HEALTH CARE EDUCATION/TRAINING PROGRAM

## 2024-09-09 PROCEDURE — 250N000011 HC RX IP 250 OP 636: Performed by: NURSE PRACTITIONER

## 2024-09-09 PROCEDURE — 120N000002 HC R&B MED SURG/OB UMMC

## 2024-09-09 PROCEDURE — 36415 COLL VENOUS BLD VENIPUNCTURE: CPT | Performed by: STUDENT IN AN ORGANIZED HEALTH CARE EDUCATION/TRAINING PROGRAM

## 2024-09-09 PROCEDURE — 250N000009 HC RX 250: Performed by: STUDENT IN AN ORGANIZED HEALTH CARE EDUCATION/TRAINING PROGRAM

## 2024-09-09 PROCEDURE — 250N000013 HC RX MED GY IP 250 OP 250 PS 637: Performed by: NURSE PRACTITIONER

## 2024-09-09 PROCEDURE — 99232 SBSQ HOSP IP/OBS MODERATE 35: CPT | Performed by: STUDENT IN AN ORGANIZED HEALTH CARE EDUCATION/TRAINING PROGRAM

## 2024-09-09 PROCEDURE — 250N000011 HC RX IP 250 OP 636: Performed by: EMERGENCY MEDICINE

## 2024-09-09 PROCEDURE — 250N000011 HC RX IP 250 OP 636: Performed by: STUDENT IN AN ORGANIZED HEALTH CARE EDUCATION/TRAINING PROGRAM

## 2024-09-09 PROCEDURE — 80048 BASIC METABOLIC PNL TOTAL CA: CPT | Performed by: STUDENT IN AN ORGANIZED HEALTH CARE EDUCATION/TRAINING PROGRAM

## 2024-09-09 RX ORDER — CEFTRIAXONE 2 G/1
2 INJECTION, POWDER, FOR SOLUTION INTRAMUSCULAR; INTRAVENOUS AT BEDTIME
Status: ACTIVE | DISCHARGE
Start: 2024-09-10 | End: 2024-09-11

## 2024-09-09 RX ORDER — SODIUM CHLORIDE 9 MG/ML
INJECTION, SOLUTION INTRAVENOUS
Status: COMPLETED
Start: 2024-09-09 | End: 2024-09-10

## 2024-09-09 RX ORDER — CEFTRIAXONE 2 G/1
2 INJECTION, POWDER, FOR SOLUTION INTRAMUSCULAR; INTRAVENOUS AT BEDTIME
Qty: 80 ML | Refills: 0 | Status: SHIPPED | OUTPATIENT
Start: 2024-09-12 | End: 2024-09-09

## 2024-09-09 RX ADMIN — OXYCODONE HYDROCHLORIDE 10 MG: 5 SOLUTION ORAL at 13:48

## 2024-09-09 RX ADMIN — OXYCODONE HYDROCHLORIDE 10 MG: 5 SOLUTION ORAL at 18:43

## 2024-09-09 RX ADMIN — MAGNESIUM SULFATE HEPTAHYDRATE: 500 INJECTION, SOLUTION INTRAMUSCULAR; INTRAVENOUS at 21:47

## 2024-09-09 RX ADMIN — ERGOCALCIFEROL 50000 UNITS: 1.25 CAPSULE, LIQUID FILLED ORAL at 08:59

## 2024-09-09 RX ADMIN — CEFTRIAXONE SODIUM 2 G: 2 INJECTION, POWDER, FOR SOLUTION INTRAMUSCULAR; INTRAVENOUS at 21:46

## 2024-09-09 RX ADMIN — DEXTROAMPHETAMINE SACCHARATE, AMPHETAMINE ASPARTATE, DEXTROAMPHETAMINE SULFATE AND AMPHETAMINE SULFATE 20 MG: 5; 5; 5; 5 TABLET ORAL at 08:59

## 2024-09-09 RX ADMIN — ONDANSETRON 4 MG: 4 TABLET, ORALLY DISINTEGRATING ORAL at 23:04

## 2024-09-09 RX ADMIN — OXYCODONE HYDROCHLORIDE 10 MG: 5 SOLUTION ORAL at 09:02

## 2024-09-09 RX ADMIN — SUCRALFATE 1 G: 1 SUSPENSION ORAL at 13:48

## 2024-09-09 RX ADMIN — ENOXAPARIN SODIUM 80 MG: 80 INJECTION SUBCUTANEOUS at 19:33

## 2024-09-09 RX ADMIN — CARVEDILOL 12.5 MG: 12.5 TABLET, FILM COATED ORAL at 08:59

## 2024-09-09 RX ADMIN — OXYCODONE HYDROCHLORIDE 10 MG: 5 SOLUTION ORAL at 23:03

## 2024-09-09 RX ADMIN — ALPRAZOLAM 0.5 MG: 0.25 TABLET ORAL at 23:05

## 2024-09-09 RX ADMIN — CARVEDILOL 12.5 MG: 12.5 TABLET, FILM COATED ORAL at 18:43

## 2024-09-09 ASSESSMENT — ACTIVITIES OF DAILY LIVING (ADL)
ADLS_ACUITY_SCORE: 28

## 2024-09-09 NOTE — PLAN OF CARE
Pt is A/O x4, can make his needs known. Ind in his room and on and out of the unit. R thin whole, 0200 BG was 120. Pt became NPO @ midnight during this shift. Pt refused his TPN , L PIV. Pt refused his 0600 BG check,Continue with POC.

## 2024-09-09 NOTE — PROGRESS NOTES
[facility]    Medicine Progress Note - Hospitalist Service, GOLD TEAM 21    Date of Admission:  9/3/2024    Assessment & Plan      Parker Acevedo is a 53 year old male admitted on 9/3/2024. He has a past medical history of Celestino-en-Y gastric bypass, malnutrition, chronic TPN, chronic pain, paroxysmal afib, anemia, ADHD and DVT. He presents to the ED due to positive blood culture at clinic.    Today  - ctn ceftriaxone 9/6 - 9/12  - IR consulted for CVC placement on 9/10    # CLABSI, prior to admit  # Indwelling Cm catheter for TPN, removed  He has had many prior admissions for CLABSI, last admitted 6/14/24 to 6/19/24 for line infection with methicillin sensitive staph epidermidis. He was seen at his clinic due to fevers, blood culture was done and came back positive for E coli. He was told to go to the ER. WBC 6.6, lactic acid 0.5. He was give a 1000 ml bolus and started on rocephin  - ID consulted   - continue ceftriaxone X7 days from negative blood culture (9/4-9/10)  - Cultures   - Bcx 9/4: NGTD   - Catheter tip Cx 9/5 with Ecoli    - Bcx 9/6: NGTD  - Cm removed on 9/5 for 48-72 hour line holiday   - IR to be consulted on 9/9 for replacement of CVC    # History of obesity, s/p Celestino-en-Y gastric bypass  # Short gut syndrome  # GERD  He has a J-tube for venting. He attempted tube feedings years ago but was unable to absorb it due to short gut syndrome. He is now maintenance on daily TPN and 3 times a week lipids. He eats about 300 calories daily  - Pharmacy consult for TPN/lipids  - Zofran PRN  - PTA Carafate  - Regular diet    # Anemia  Hgb has been 11-13 and was 11.4 on admitted.   - monitor    # Chronic pain   - Continue fentanyl patch   - PTA oxycodone    # ADHD   - PTA amphetamine-dextroamphetamine     # Paroxysmal Afib  - Continue carvedilol 12.5mg BID    # DVT   Non occlusive deep venous thrombus is seen within the mid and proximal right subclavian  vein and right internal jugular vein 7/4/23   -Continue enoxaparin as prescribed PTA           Diet: parenteral nutrition - ADULT compounded formula  Regular Diet Adult  NPO per Anesthesia Guidelines for Procedure/Surgery Except for: Meds, Ice Chips    DVT Prophylaxis: Enoxaparin (Lovenox) SQ  Sanchez Catheter: Not present  Lines: None       Cardiac Monitoring: None  Code Status: Full Code      Clinically Significant Risk Factors                             # Moderate Malnutrition: based on nutrition assessment      # Financial/Environmental Concerns: none               Disposition Plan     Medically Ready for Discharge: Anticipated in 2-4 Days             [facility] [address] [place] [address]  Securely message with [employer] (more info)  Text page via [facility] Paging/Directory   See signed in provider for up to date coverage information  ______________________________________________________________________    Interval History   Parker is appropriately unhappy that he has to wait until tomorrow to get the line placed and having to change his appointment his outpatient pain management doctor. He is understanding and with his wife agreeable to plan. No new symptoms today, ready to discharge as soon as line is in place and abx are delivered to home.     Physical Exam   Vital Signs: Temp: 97.8  F (36.6  C) Temp src: Oral BP: (!) 122/91 Pulse: 57   Resp: 18 SpO2: 100 % O2 Device: None (Room air)    Weight: 195 lbs 11.2 oz    General: well appearing, awake  Resp: breathing comfortable on room air  Neuro: alert, oriented, normal gait    Medical Decision Making             Data     I have personally reviewed the following data over the past 24 hrs:    6.2  \   11.3 (L)   / 254     140 105 13.5 /  104 (H)   3.9 27 0.86 \       Imaging results reviewed over the past 24 hrs:   No results found for this or any previous visit (from the past 24 hour(s)).

## 2024-09-09 NOTE — PLAN OF CARE
Goal Outcome Evaluation:    VS: BP (!) 122/91 (BP Location: Left arm)   Pulse 57   Temp 97.8  F (36.6  C) (Oral)   Resp 18   Wt 88.8 kg (195 lb 11.2 oz)   SpO2 100%   BMI 28.08 kg/m       O2: Stable on room air >90%   Output: Voids without difficulties    Last BM: No BM this shift    Activity: Independent   Up for meals? Yes   Skin: Generalized bruises   Pain: Reports pain at 8/10 managed with PRN oxy   CMS: AO x4   Dressing: None   Diet: Regular, NPO @ midnight 9/10 for procedure   LDA: Left PIV SL   Equipment: None    Plan: Continue with POC    Additional Info: CVC placement 9/10  TPN daily, refused last nights administration per RN  Fentanyl patch in place (Right shoulder)

## 2024-09-09 NOTE — PROGRESS NOTES
Care Management Follow Up    Length of Stay (days): 5    Expected Discharge Date: 09/10/2024     Concerns to be Addressed: discharge planning     Patient plan of care discussed at interdisciplinary rounds: Yes    Anticipated Discharge Disposition: Home Infusion (resumption of TPN and initiation of new IV abx); Home Care ([facility] for nursing r/t TPN infusions and new IV abx)     Anticipated Discharge Services:  [facility] for home infusion services  Anticipated Discharge DME: No new DME anticipated    Patient/family educated on Medicare website which has current facility and service quality ratings: no (N/A)  Education Provided on the Discharge Plan: Yes (not by this writer)  Patient/Family in Agreement with the Plan: yes    Referrals Placed by CM/SW:  (None at this time)  Private pay costs discussed: Not applicable    Discussed  Partnership in Safe Discharge Planning  document with patient/family: No     Handoff Completed: Not currently    Additional Information:    Infusion Agency:  [facility]  : Sharon Herrera's direct line: [phone] ext [phone]  For Margie the pharmacist: ext [phone]  Fax: [phone]  Services provided prior to admission: TPN  Services to be provided after discharge: Resume TPN w/o changes to formula and initiate new IV abx  They need orders by 11:00am PST on the day before discharge.     Home Care Agency:  [facility]  Ph: [phone]  Fx: [phone]  Services provided prior to admission: RN related to pt's central line cares and lab draws  ____________________________________________    8:18am - Received VM from Sharon at [facility], she stated she is seeking an RUPAL update.  She reiterated that [facility] will need provider notes, labs, TPN formula, SHAN order, and IV abx orders if new IV abx are needed.    9:20am - Received call from Sharon, informed her that this writer has not yet discussed pt w/ care team, but based on documentation, it appears pt will want to discharge tomorrow  and will need new IV abx.  Sharon stated the previously requested documentation/orders will need to be received by 11:00am PST today.  She confirmed that [facility] only needs TPN recipe if changes have been made.  In the event no changes are being made to PTA TPN recipe, a SHAN order stating to resume PTA formula would be acceptable.    10:00am - Case discussed in rounds this AM.  Provider stated IR will place a line tomorrow.  Following line placement, pt can discharge.  Provider confirmed no changes to PTA TPN formula.  Provider to update IV abx orders and re-sign all orders.  Provider aware this must be completed soon d/t [facility] timing requirements.    10:42am - Received call from Sharon, she requested update.  This writer confirmed pt intends to discharge tomorrow and will need new IV abx and no changes to PTA TPN formula.  Informed her that SHAN orders for [facility] and [facility] will be available in the orders that are to be sent over soon.    Received notification from provider that orders are completed.    10:53am - Faxed facesheet, H&P, latest medicine provider and ID provider notes, labs, and orders to [facility].    11:53am - Received call from Sharon, she confirmed orders were received.  She stated she does not see a drug order or provider signature.  This writer described where to find these, she confirmed they are there and that they will work.  She pointed out that start date of IV abx is listed as 9/12.  She stated orders can be updated and re-faxed, or provider can give verbal order by calling their pharmacist, Margie.  She explained that a verbal order would be preferable/faster.    Subsequently notified provider via [employer] of need for update to orders, provided pharmacist number.  Provider promptly confirmed this was completed.    12:41pm - Received call from Margie at [facility], she stated she is seeing conflicting documentation regarding what pt is receiving in-hospital, as it is different from  "PTA regimen.  She was seeking clarification.  This writer stated that this writer will request provider to reach out again to clarify and answer questions that may be out of this writer's scope, but that this writer anticipates [facility] is seeing PPN-related documentation, as pt does not have central line at this time.  Margie expressed understanding, stated she would appreciate the call from provider.    Notified provider of request to contact Margie regarding TPN.  Provider later updated this writer that this was completed.      Next Steps:  - Follow up w/ Sharon at [facility] tomorrow to make sure there are no issues w/ plan to discharge to home tomorrow w/ delivery of IV abx for pt's bedtime dose at home (per previous discussion, teaching for IV abx on day of discharge will be coordinated by [facility] and a home care agency [presumably [facility], as [facility] was using them for pt's services PTA] - teaching would occur at home)    - Ensure [facility] does not need orders re-sent at discharge (they were sent on 9/9)    - Send orders to [facility] at discharge    - Address additional discharge needs that may arise          Care mgmt will continue to follow.    GERRI Berry  [facility]  Units 8M/S & 10 ICU  Reachable on [employer] - Search by name or search \"RNCC\"  Phone: [phone]        "

## 2024-09-10 LAB
ANION GAP SERPL CALCULATED.3IONS-SCNC: 8 MMOL/L (ref 7–15)
BUN SERPL-MCNC: 13 MG/DL (ref 6–20)
CALCIUM SERPL-MCNC: 8.3 MG/DL (ref 8.8–10.4)
CHLORIDE SERPL-SCNC: 106 MMOL/L (ref 98–107)
CREAT SERPL-MCNC: 0.79 MG/DL (ref 0.67–1.17)
EGFRCR SERPLBLD CKD-EPI 2021: >90 ML/MIN/1.73M2
ERYTHROCYTE [DISTWIDTH] IN BLOOD BY AUTOMATED COUNT: 17 % (ref 10–15)
GLUCOSE BLDC GLUCOMTR-MCNC: 111 MG/DL (ref 70–99)
GLUCOSE BLDC GLUCOMTR-MCNC: 116 MG/DL (ref 70–99)
GLUCOSE BLDC GLUCOMTR-MCNC: 82 MG/DL (ref 70–99)
GLUCOSE BLDC GLUCOMTR-MCNC: 91 MG/DL (ref 70–99)
GLUCOSE BLDC GLUCOMTR-MCNC: 92 MG/DL (ref 70–99)
GLUCOSE SERPL-MCNC: 87 MG/DL (ref 70–99)
HCO3 SERPL-SCNC: 26 MMOL/L (ref 22–29)
HCT VFR BLD AUTO: 35.4 % (ref 40–53)
HGB BLD-MCNC: 11.3 G/DL (ref 13.3–17.7)
INR PPP: 1.04 (ref 0.85–1.15)
MCH RBC QN AUTO: 29.4 PG (ref 26.5–33)
MCHC RBC AUTO-ENTMCNC: 31.9 G/DL (ref 31.5–36.5)
MCV RBC AUTO: 92 FL (ref 78–100)
PLATELET # BLD AUTO: 249 10E3/UL (ref 150–450)
POTASSIUM SERPL-SCNC: 3.8 MMOL/L (ref 3.4–5.3)
RBC # BLD AUTO: 3.84 10E6/UL (ref 4.4–5.9)
SODIUM SERPL-SCNC: 140 MMOL/L (ref 135–145)
WBC # BLD AUTO: 5.8 10E3/UL (ref 4–11)

## 2024-09-10 PROCEDURE — 85027 COMPLETE CBC AUTOMATED: CPT | Performed by: STUDENT IN AN ORGANIZED HEALTH CARE EDUCATION/TRAINING PROGRAM

## 2024-09-10 PROCEDURE — 250N000011 HC RX IP 250 OP 636: Performed by: PEDIATRICS

## 2024-09-10 PROCEDURE — 250N000011 HC RX IP 250 OP 636: Performed by: STUDENT IN AN ORGANIZED HEALTH CARE EDUCATION/TRAINING PROGRAM

## 2024-09-10 PROCEDURE — 36415 COLL VENOUS BLD VENIPUNCTURE: CPT

## 2024-09-10 PROCEDURE — 80048 BASIC METABOLIC PNL TOTAL CA: CPT | Performed by: STUDENT IN AN ORGANIZED HEALTH CARE EDUCATION/TRAINING PROGRAM

## 2024-09-10 PROCEDURE — 250N000013 HC RX MED GY IP 250 OP 250 PS 637: Performed by: NURSE PRACTITIONER

## 2024-09-10 PROCEDURE — 99232 SBSQ HOSP IP/OBS MODERATE 35: CPT | Performed by: PEDIATRICS

## 2024-09-10 PROCEDURE — 120N000002 HC R&B MED SURG/OB UMMC

## 2024-09-10 PROCEDURE — 250N000009 HC RX 250: Performed by: PEDIATRICS

## 2024-09-10 PROCEDURE — 36415 COLL VENOUS BLD VENIPUNCTURE: CPT | Performed by: STUDENT IN AN ORGANIZED HEALTH CARE EDUCATION/TRAINING PROGRAM

## 2024-09-10 PROCEDURE — 85610 PROTHROMBIN TIME: CPT

## 2024-09-10 PROCEDURE — 258N000003 HC RX IP 258 OP 636: Performed by: STUDENT IN AN ORGANIZED HEALTH CARE EDUCATION/TRAINING PROGRAM

## 2024-09-10 PROCEDURE — 250N000011 HC RX IP 250 OP 636: Performed by: NURSE PRACTITIONER

## 2024-09-10 RX ORDER — ACETAMINOPHEN 325 MG/10.15ML
650 LIQUID ORAL EVERY 4 HOURS PRN
Status: DISCONTINUED | OUTPATIENT
Start: 2024-09-10 | End: 2024-09-11 | Stop reason: HOSPADM

## 2024-09-10 RX ORDER — CEFTRIAXONE 2 G/1
2 INJECTION, POWDER, FOR SOLUTION INTRAMUSCULAR; INTRAVENOUS AT BEDTIME
Status: DISCONTINUED | OUTPATIENT
Start: 2024-09-10 | End: 2024-09-11 | Stop reason: HOSPADM

## 2024-09-10 RX ADMIN — FENTANYL 1 PATCH: 25 PATCH TRANSDERMAL at 22:06

## 2024-09-10 RX ADMIN — OXYCODONE HYDROCHLORIDE 10 MG: 5 SOLUTION ORAL at 23:10

## 2024-09-10 RX ADMIN — ENOXAPARIN SODIUM 80 MG: 80 INJECTION SUBCUTANEOUS at 09:01

## 2024-09-10 RX ADMIN — OXYCODONE HYDROCHLORIDE 10 MG: 5 SOLUTION ORAL at 09:12

## 2024-09-10 RX ADMIN — OXYCODONE HYDROCHLORIDE 10 MG: 5 SOLUTION ORAL at 14:47

## 2024-09-10 RX ADMIN — DEXTROAMPHETAMINE SACCHARATE, AMPHETAMINE ASPARTATE, DEXTROAMPHETAMINE SULFATE AND AMPHETAMINE SULFATE 20 MG: 5; 5; 5; 5 TABLET ORAL at 09:01

## 2024-09-10 RX ADMIN — CARVEDILOL 12.5 MG: 12.5 TABLET, FILM COATED ORAL at 09:01

## 2024-09-10 RX ADMIN — ONDANSETRON 4 MG: 4 TABLET, ORALLY DISINTEGRATING ORAL at 14:47

## 2024-09-10 RX ADMIN — CEFTRIAXONE SODIUM 2 G: 2 INJECTION, POWDER, FOR SOLUTION INTRAMUSCULAR; INTRAVENOUS at 19:53

## 2024-09-10 RX ADMIN — OXYCODONE HYDROCHLORIDE 10 MG: 5 SOLUTION ORAL at 18:49

## 2024-09-10 RX ADMIN — OXYCODONE HYDROCHLORIDE 10 MG: 5 SOLUTION ORAL at 05:50

## 2024-09-10 RX ADMIN — MAGNESIUM SULFATE HEPTAHYDRATE: 500 INJECTION, SOLUTION INTRAMUSCULAR; INTRAVENOUS at 22:10

## 2024-09-10 RX ADMIN — ENOXAPARIN SODIUM 80 MG: 80 INJECTION SUBCUTANEOUS at 19:53

## 2024-09-10 RX ADMIN — ALPRAZOLAM 0.5 MG: 0.25 TABLET ORAL at 23:10

## 2024-09-10 RX ADMIN — CARVEDILOL 12.5 MG: 12.5 TABLET, FILM COATED ORAL at 18:15

## 2024-09-10 RX ADMIN — ONDANSETRON 4 MG: 4 TABLET, ORALLY DISINTEGRATING ORAL at 23:10

## 2024-09-10 RX ADMIN — SODIUM CHLORIDE 500 ML: 9 INJECTION, SOLUTION INTRAVENOUS at 00:34

## 2024-09-10 ASSESSMENT — ACTIVITIES OF DAILY LIVING (ADL)
ADLS_ACUITY_SCORE: 28

## 2024-09-10 NOTE — PROGRESS NOTES
[facility]    Medicine Progress Note - Hospitalist Service, GOLD TEAM 21    Date of Admission:  9/3/2024    Assessment & Plan      53 year old male admitted on 9/3/2024. He has a past medical history of Celestino-en-Y gastric bypass, malnutrition, chronic TPN, chronic pain, paroxysmal afib, anemia, ADHD and DVT. He presents to the ED due to positive blood culture at clinic.     Today  - IR consulted for CVC placement on 9/11; NPO at midnight  -Continue with ceftriaxone end of treatment 9/12, arranged outpatient infusion for discharge planning     # CLABSI, prior to admit  # Indwelling Cm catheter for TPN, removed  He has had many prior admissions for CLABSI, last admitted 6/14/24 to 6/19/24 for line infection with methicillin sensitive staph epidermidis. He was seen at his clinic due to fevers, blood culture was done and came back positive for E coli. He was told to go to the ER. WBC 6.6, lactic acid 0.5. He was give a 1000 ml bolus and started on rocephin  - continue ceftriaxone X7 days from negative blood culture (EOT 9/12)  - Bcx 9/4: NGTD  - Catheter tip Cx 9/5 with Ecoli   - Bcx 9/6: NGTD  - Cm removed on 9/5 for line holiday; IR to be consulted on 9/9 for replacement of CVC; pending replacement     # History of obesity, s/p Celestino-en-Y gastric bypass  # Short gut syndrome  # GERD  He has a J-tube for venting. He attempted tube feedings years ago but was unable to absorb it due to short gut syndrome. He is now maintenance on daily TPN and 3 times a week lipids. He eats about 300 calories daily  - Pharmacy consult for TPN/lipids  - Zofran PRN  - PTA Carafate  - Regular diet     # Anemia  Hgb has been 11-13 and was 11.4 on admitted.   - monitor     # Chronic pain   - Continue fentanyl patch   - PTA oxycodone     # ADHD   - PTA amphetamine-dextroamphetamine      # Paroxysmal Afib  - Continue carvedilol 12.5mg BID     # DVT   Non occlusive deep venous thrombus is  seen within the mid and proximal right subclavian vein and right internal jugular vein 7/4/23   -Continue enoxaparin as prescribed PTA            Diet: parenteral nutrition - ADULT compounded formula  parenteral nutrition - ADULT compounded formula  NPO per Anesthesia Guidelines for Procedure/Surgery Except for: Meds  Regular Diet Adult    DVT Prophylaxis: Enoxaparin (Lovenox) SQ  Sanchez Catheter: Not present  Lines: None     Cardiac Monitoring: None  Code Status: Full Code      Clinically Significant Risk Factors                             # Moderate Malnutrition: based on nutrition assessment    # Financial/Environmental Concerns: none               Disposition Plan     Medically Ready for Discharge: Anticipated Tomorrow             Bartolo Waller MD  Hospitalist Service, GOLD TEAM 21  [facility]  Securely message with [employer] (more info)  Text page via [employer] Paging/Directory   See signed in provider for up to date coverage information  ______________________________________________________________________    Interval History   ON ROLANDA    Feeling good today, no fevers chills or sweats, no pain anywhere  Feeling hungry  Wanting to go home    Spoke to patient, patient's wife the nurse bedside and care coordination team as well as Tanisha from interventional radiology  Initially we had planned to have his line replaced today unfortunately due to the OR schedule unable to do so this afternoon will need to be done tomorrow (Wednesday morning)  Planning for discharge tomorrow after replacement of his line, last dose ceftriaxone arranged by care coordination team appreciate their assistance      Physical Exam   Vital Signs: Temp: 98.3  F (36.8  C) Temp src: Oral BP: 122/86 (133/90) Pulse: 72   Resp: 18 SpO2: 98 % O2 Device: None (Room air)    Weight: 195 lbs 11.2 oz    EXAM  General: Well appearing man standing up next to bed in no acute distress, pleasant and  conversant  Head: NC, AT  Eye: symm gaze, anicteric sclerae  ENT: patent nares wo drainage/epistaxis  Pulm: CTAB, comfortable WOB on room air  CV: normal rate, regular rhythm,   GI: soft, NTND, large stable reducible abdominal wall hernia  Neuro: awake, alert, hearing speech and phonation, intact grossly     Medical Decision Making       MANAGEMENT DISCUSSED with the following over the past 24 hours: Care discussed with patient's wife and patient who gives additional history, discussed with Tanisha from interventional radiology ([facility]) as well as care coordinator and nurse bedside   NOTE(S)/MEDICAL RECORDS REVIEWED over the past 24 hours: Dr. Jennings note reviewed, Dr. Schmitt note reviewed, Dr. Delacruz note reviewed  Medical complexity over the past 24 hours:  - Decision regarding MINOR SURGERY with additional patient or procedural risks      Data     I have personally reviewed the following data over the past 24 hrs:    5.8  \   11.3 (L)   / 249     140 106 13.0 /  92   3.8 26 0.79 \     INR:  1.04 PTT:  N/A   D-dimer:  N/A Fibrinogen:  N/A       Imaging results reviewed over the past 24 hrs:   No results found for this or any previous visit (from the past 24 hour(s)).

## 2024-09-10 NOTE — PROGRESS NOTES
Shift 9874-7662:Past history of Gastric Bypass,malnutrion, Chronic TPN,Chronic pain Admitted with +blood Culture  Summary:CVC placement on 9/10 Npo after Mn  VS:       Pt A/O X 4. Afebrile. VSS. Lungs- Clear bilaterally             Output:       Bowels- + in all four quadrants. Voids spontaneously without   difficulty in the toilet.      Activity:       Pt up independently .     Skin:   No skin issues .     Pain:       Has pain in the abdomen Pt has been given Oxycodone Prn .   Pain rated 8/10   CMS:       CMS and Neuro's are intact. Denies numbness and tingling in all extremities.      Dressing:     No dressings      Diet:       Pt is on a Regular  diet and appetite was good this shift.    Npo after Mn   LDA:       PIV is patent in the Left arm       Equipment:       Pt refused PCD's on BLE's. Bilateral heels are elevated off the bed.Pt is on Lovenox     Plan:       Pt is able to make needs known and the call light is within the pt's reach. Continue to monitor.       Additional Info:        .

## 2024-09-10 NOTE — PLAN OF CARE
Goal Outcome Evaluation:    VS: /66 (BP Location: Right arm, Patient Position: Sitting, Cuff Size: Adult Regular)   Pulse 54   Temp 98  F (36.7  C) (Oral)   Resp 18   Wt 88.8 kg (195 lb 11.2 oz)   SpO2 97%   BMI 28.08 kg/m       O2: Stable on room air >90%   Output: Voids without difficulties   Last BM: No BM this shift    Activity: Independent   Up for meals? Yes    Skin: Scatterred bruising   Pain: Reports pain at 7/10 managed with PRN oxy   CMS: AO x4   Dressing: None   Diet: Reg    LDA: Left PIV SL   Equipment:    Plan: NPO at midnight    Additional Info: *CVC placement now scheduled for 9/11/24, possible discharge afterwards

## 2024-09-10 NOTE — PROGRESS NOTES
Care Management Follow Up    Length of Stay (days): 6    Expected Discharge Date: 09/11/2024     Concerns to be Addressed: discharge planning     Patient plan of care discussed at interdisciplinary rounds: Yes    Anticipated Discharge Disposition: Home Infusion (resumption of TPN and initiation of new IV abx); Home Care ([facility] for nursing r/t TPN infusions and new IV abx)      Anticipated Discharge Services:  [facility] for home infusion services   Anticipated Discharge DME: No new DME anticipated    Patient/family educated on Medicare website which has current facility and service quality ratings: no (N/A)  Education Provided on the Discharge Plan: Yes  Patient/Family in Agreement with the Plan: yes    Referrals Placed by CM/SW:  (None at this time)  Private pay costs discussed: Not applicable    Discussed  Partnership in Safe Discharge Planning  document with patient/family: No     Handoff Completed: Not currently    Additional Information:    Infusion Agency:  [facility]  : Sharon Herrera's direct line: [phone] ext [phone]  For Margie, the pharmacist: ext [phone]  Fax: [phone]  Services provided prior to admission: TPN  Services to be provided after discharge: Resume TPN w/o changes to formula and initiate new IV abx  They need orders by 11:00am PST on the day before discharge.     Home Care Agency:  [facility]  Ph: [phone]  Fx: [phone]  Services provided prior to admission: RN related to pt's central line cares and lab draws  ____________________________________________    10:00am - Case discussed in rounds this AM.  Updated oncoming provider that a discharge was being planned for today, provider acknowledged.  Provider stated pt will be at his line placement procedure at 1:00pm, which will last several hours.  Provider stated pt could discharge later this evening following that line placement.    CHW notified this writer that pt requested to speak w/ this writer, preferably around  "2:00pm when his wife arrives, as he wants to inform this writer that his wife will be doing his cares at home related to his central line.  Of note, pt will be in line placement procedure at the requested time to speak.    10:47am - Received call from Sharon at [facility], she confirmed discharge for today.  She stated they are sorting out delivery for today but that they missed packing an item on their delivery truck.  She explained that a \"rate flow regulator\" or \"dial flow regulator\" was not included, requested this writer to provide one to pt.  Inquired what this item is, Sharon explained that it connects to IV tubing to control rate of pushing med, as the ceftriaxone is being given via IV push for pt in the home setting.  Explained that this writer has not seen anything like that at hospital, but that this writer can ask around.  She stated she will attempt to see if one can be delivered for tomorrow, but explained that they cannot provide one today (but can otherwise deliver all other necessary TPN, IV abx, and associated supplies today).    Spoke w/ nursing staff, they were not familiar w/ device in question and were unable to find any on the unit, so suggested contacting [facility] to see if they have any.    Called [facility], no answer, left  requesting call back about the rate flow regulator.    Checked w/ [facility] liaison, liaison explained that they do not have any of that item in stock here.    11:13am - Attempted to meet w/ pt at bedside, pt not present, intend to return later.    11:35am - Attempted to meet w/ pt, other staff were arriving to work w/ pt, intend to return later.    Updated provider to issue w/ [facility] not providing a rate flow regulator.  Provider acknowledged.  In the event one cannot be provided by [facility], provider does not endorse discharge for today and advises pt stay until tomorrow, once it can be confirmed that the regulator will be available for pt's use at home.  Also " discussed pt wanting his wife to do dressing changes.  Provider and this writer in agreement that it is recommended pt utilize an established health care service, such as home care agency or clinic, but that pt has the right to decline services post-discharge, if he desires.  Education to be provided to pt on this topic later.    11:45am - Attempted calling [facility] again, no answer, did not leave another VM.    Met w/ pt at bedside, introduced self and role, and discussed discharge planning.  Pt stated his wife updated him that [facility] delivered everything today.  Pt stated he also wanted to tell this writer that he has had negative experiences w/ home care agencies and many different home care nurses providing his care.  He explained that they do not follow proper procedure for his dressing changes.  He provided examples, such as home care nurses not scrubbing connectors for 15 seconds w/ alcohol prep pad.  He stated that his wife has been trained by [place] on how to do central line dressing changes for him.  He stated that she does a better job than the home care nurses and would prefer her to provide his dressing changes.  He stated he is fine w/ lab draws being completed by home care.  This writer explained that pt has the right to decline services post-discharge, but explained that the medical recommendation is that pt partner w/ a home care agency or go to a clinic for his dressing changes/labs.  Informed pt that [facility] and his PCP office can assist in getting a new home care agency or transitioning into a clinic setting for dressing changes and labs.  Explained that this writer will be providing orders to [facility] that will resume his PTA services w/ [facility] and [facility], including the nursing for dressing changes.  Pt expressed understanding.  Provider arrived to room and joined discussion.  This writer explained to pt about the issue surrounding the flow rate regulator from [facility] and  that this writer was unable to source one from [facility].  Explained to pt that because of this, it is recommended that he postpone discharge b/c he would be unable to administer IV abx at home.  Pt asked provider if his dosing of IV abx can be moved to 8:00pm today so that he could discharge later tonight.  Provider confirmed dosing can be moved and advised pt to take his next dose at 8:00pm tomorrow, if dosing were to be shifted.  Pt expressed understanding, stated he would like to discharge tonight.  Provider acknowledged, informed pt that if he is not able to take his IV abx in-home tomorrow for whatever reason, he should present to emergency dept to get his dose.  Provider explained importance of not missing abx.  Pt expressed understanding and agreement to this plan, had no questions for this writer.  Provider stated he will complete orders when able.  This writer excused self from room at this time to follow up w/ [facility].    12:06pm - Received VM from Sharon, she was requesting to know if a flow rate regulator was able to be provided.    12:35pm - Called Sharon, informed her that a regulator cannot be provided.  Informed her of new plan for pt to discharge after receiving IV abx dose tonight.  She confirmed that a regulator can be delivered for tomorrow.  She also stated [facility] can coordinate w/ home care to see if they can provide one.  Informed her that pt would prefer his wife do central line dressing changes instead of HC agency, d/t reported negative experiences/concerns.  Advised they speak w/ pt about looking into alternative agencies, Sharon expressed understanding.  Sharon had no further questions/concerns at this time.    Received call from Karis w/ [facility], she stated that she spoke w/ pt's wife and pt's wife stated that they have flow rate/dial flow regulator devices.  Karis explained that [facility] had previously provided them w/ several as part of an emergency set.  She stated she just  "wanted to let this writer know of this, as she is aware pt is eager to discharge.  She explained that pt's spouse expressed knowing how to utilize them and admin IV abx, and declined further teaching from home care or from [facility] pharmacist.  Pt has all items he needs for home infusion for TPN and IV abx.    Notified bedside RN and provider to the above info.  RN notified this writer that pt has not yet gone to IR procedure.  She also stated that pt reported that he would like to discharge and do his IV abx in-home tonight.    1:50pm - Charge RN received call from pharmacist, passed off phone to provider and this writer.  Had group discussion regarding discharge.  Provider stated pt was moved off IR schedule so will not get line placed today and must remain in-hospital another day.  Provider stated he will update pt to this.  Pharmacist inquired if TPN recipe is needed, this writer inquired if there are any changes, pharmacist stated no changes.  Informed pharmacist that d/t no changes, SHAN order was sufficient for home infusion and all TPN was delivered to home already, pharmacist acknowledged.        Next Steps:  - Send orders to [facility] at discharge    - Address additional discharge needs that may arise (this writer does not anticipate anything new will come up at this time)    Nothing further needs to be done w/ [facility], as they have received all orders and delivered TPN and IV abx to pt's home.          Care mgmt will continue to follow.    GERRI Berry  [facility]  Units 8M/S & 10 ICU  Reachable on [employer] - Search by name or search \"RNCC\"  Phone: [phone]    "

## 2024-09-10 NOTE — CONSULTS
"    Interventional Radiology  [facility] Consult Note  09/10/24   7:55 AM    Consult Requested: \"azevedo replacement\"    Please see IR consult note from 9/8/24 by Dr. Mccullough.    Recommendations/Plan:  Patient is on IR schedule tomorrow for a tunneled, cuffed, low-flow, central venous catheter placement.   -Labs WNL for procedure.  -Orders entered for procedure.  -Medications to be held include: none    Vitals:   /62 (BP Location: Right arm)   Pulse 79   Temp 98.6  F (37  C) (Oral)   Resp 18   Wt 88.8 kg (195 lb 11.2 oz)   SpO2 98%   BMI 28.08 kg/m      Pertinent Labs:   Lab Results   Component Value Date    WBC 6.2 09/09/2024    WBC 4.9 09/07/2024    WBC 6.3 09/06/2024    WBC 6.9 06/29/2021    WBC 8.1 06/14/2021    WBC 7.5 06/09/2021     Lab Results   Component Value Date    HGB 11.3 09/09/2024    HGB 11.8 09/07/2024    HGB 11.4 09/06/2024    HGB 12.1 06/29/2021    HGB 12.0 06/14/2021    HGB 13.3 06/09/2021     Lab Results   Component Value Date     09/09/2024     09/07/2024     09/06/2024     06/29/2021     06/14/2021     06/09/2021     Lab Results   Component Value Date    INR 1.2 (L) 08/12/2024    INR 1.10 01/22/2024    INR 1.07 05/07/2021    PTT 25 09/29/2023    PTT 26 07/22/2019     Lab Results   Component Value Date    POTASSIUM 3.9 09/09/2024    POTASSIUM 3.8 12/13/2022    POTASSIUM 3.5 06/29/2021        COVID-19 Antibody Results, Testing for Immunity           No data to display              COVID-19 PCR Results          8/4/2023    04:27 11/11/2023    01:30   COVID-19 PCR Results   SARS CoV2 PCR Negative  Negative        Juana Burgos PA-C  Interventional Radiology    "

## 2024-09-11 ENCOUNTER — APPOINTMENT (OUTPATIENT)
Dept: INTERVENTIONAL RADIOLOGY/VASCULAR | Facility: CLINIC | Age: 54
DRG: 315 | End: 2024-09-11
Payer: COMMERCIAL

## 2024-09-11 VITALS
WEIGHT: 195.7 LBS | SYSTOLIC BLOOD PRESSURE: 125 MMHG | OXYGEN SATURATION: 94 % | HEART RATE: 64 BPM | RESPIRATION RATE: 16 BRPM | TEMPERATURE: 97.3 F | DIASTOLIC BLOOD PRESSURE: 88 MMHG | BODY MASS INDEX: 28.08 KG/M2

## 2024-09-11 LAB
ANION GAP SERPL CALCULATED.3IONS-SCNC: 6 MMOL/L (ref 7–15)
BACTERIA BLD CULT: NO GROWTH
BUN SERPL-MCNC: 13 MG/DL (ref 6–20)
CALCIUM SERPL-MCNC: 8.3 MG/DL (ref 8.8–10.4)
CHLORIDE SERPL-SCNC: 105 MMOL/L (ref 98–107)
CREAT SERPL-MCNC: 0.95 MG/DL (ref 0.67–1.17)
EGFRCR SERPLBLD CKD-EPI 2021: >90 ML/MIN/1.73M2
ERYTHROCYTE [DISTWIDTH] IN BLOOD BY AUTOMATED COUNT: 16.8 % (ref 10–15)
GLUCOSE BLDC GLUCOMTR-MCNC: 118 MG/DL (ref 70–99)
GLUCOSE BLDC GLUCOMTR-MCNC: 119 MG/DL (ref 70–99)
GLUCOSE SERPL-MCNC: 93 MG/DL (ref 70–99)
HCO3 SERPL-SCNC: 29 MMOL/L (ref 22–29)
HCT VFR BLD AUTO: 34.1 % (ref 40–53)
HGB BLD-MCNC: 10.6 G/DL (ref 13.3–17.7)
MCH RBC QN AUTO: 29 PG (ref 26.5–33)
MCHC RBC AUTO-ENTMCNC: 31.1 G/DL (ref 31.5–36.5)
MCV RBC AUTO: 93 FL (ref 78–100)
PLATELET # BLD AUTO: 244 10E3/UL (ref 150–450)
POTASSIUM SERPL-SCNC: 4 MMOL/L (ref 3.4–5.3)
RBC # BLD AUTO: 3.66 10E6/UL (ref 4.4–5.9)
SODIUM SERPL-SCNC: 140 MMOL/L (ref 135–145)
WBC # BLD AUTO: 5.9 10E3/UL (ref 4–11)

## 2024-09-11 PROCEDURE — 272N000504 HC NEEDLE CR4

## 2024-09-11 PROCEDURE — 250N000013 HC RX MED GY IP 250 OP 250 PS 637: Performed by: NURSE PRACTITIONER

## 2024-09-11 PROCEDURE — 76937 US GUIDE VASCULAR ACCESS: CPT | Mod: 26

## 2024-09-11 PROCEDURE — 99152 MOD SED SAME PHYS/QHP 5/>YRS: CPT

## 2024-09-11 PROCEDURE — 250N000013 HC RX MED GY IP 250 OP 250 PS 637: Performed by: PHYSICIAN ASSISTANT

## 2024-09-11 PROCEDURE — 77001 FLUOROGUIDE FOR VEIN DEVICE: CPT

## 2024-09-11 PROCEDURE — 99153 MOD SED SAME PHYS/QHP EA: CPT

## 2024-09-11 PROCEDURE — 77001 FLUOROGUIDE FOR VEIN DEVICE: CPT | Mod: 26

## 2024-09-11 PROCEDURE — 80048 BASIC METABOLIC PNL TOTAL CA: CPT | Performed by: STUDENT IN AN ORGANIZED HEALTH CARE EDUCATION/TRAINING PROGRAM

## 2024-09-11 PROCEDURE — C1751 CATH, INF, PER/CENT/MIDLINE: HCPCS

## 2024-09-11 PROCEDURE — 99238 HOSP IP/OBS DSCHRG MGMT 30/<: CPT | Performed by: PEDIATRICS

## 2024-09-11 PROCEDURE — 250N000011 HC RX IP 250 OP 636

## 2024-09-11 PROCEDURE — 85049 AUTOMATED PLATELET COUNT: CPT | Performed by: STUDENT IN AN ORGANIZED HEALTH CARE EDUCATION/TRAINING PROGRAM

## 2024-09-11 PROCEDURE — 250N000009 HC RX 250

## 2024-09-11 PROCEDURE — 36558 INSERT TUNNELED CV CATH: CPT | Mod: LT

## 2024-09-11 PROCEDURE — 36415 COLL VENOUS BLD VENIPUNCTURE: CPT | Performed by: STUDENT IN AN ORGANIZED HEALTH CARE EDUCATION/TRAINING PROGRAM

## 2024-09-11 PROCEDURE — 250N000011 HC RX IP 250 OP 636: Performed by: STUDENT IN AN ORGANIZED HEALTH CARE EDUCATION/TRAINING PROGRAM

## 2024-09-11 PROCEDURE — C1769 GUIDE WIRE: HCPCS

## 2024-09-11 RX ORDER — FENTANYL CITRATE 50 UG/ML
25-50 INJECTION, SOLUTION INTRAMUSCULAR; INTRAVENOUS EVERY 5 MIN PRN
Status: DISCONTINUED | OUTPATIENT
Start: 2024-09-11 | End: 2024-09-11 | Stop reason: HOSPADM

## 2024-09-11 RX ORDER — CEFTRIAXONE 2 G/1
2 INJECTION, POWDER, FOR SOLUTION INTRAMUSCULAR; INTRAVENOUS AT BEDTIME
Status: ACTIVE | DISCHARGE
Start: 2024-09-11 | End: 2024-09-12

## 2024-09-11 RX ORDER — NALOXONE HYDROCHLORIDE 0.4 MG/ML
0.2 INJECTION, SOLUTION INTRAMUSCULAR; INTRAVENOUS; SUBCUTANEOUS
Status: DISCONTINUED | OUTPATIENT
Start: 2024-09-11 | End: 2024-09-11 | Stop reason: HOSPADM

## 2024-09-11 RX ORDER — CLINDAMYCIN PHOSPHATE 900 MG/50ML
900 INJECTION, SOLUTION INTRAVENOUS
Status: COMPLETED | OUTPATIENT
Start: 2024-09-11 | End: 2024-09-11

## 2024-09-11 RX ORDER — HEPARIN SODIUM,PORCINE 10 UNIT/ML
5-20 VIAL (ML) INTRAVENOUS
OUTPATIENT
Start: 2024-09-11

## 2024-09-11 RX ORDER — NALOXONE HYDROCHLORIDE 0.4 MG/ML
0.4 INJECTION, SOLUTION INTRAMUSCULAR; INTRAVENOUS; SUBCUTANEOUS
Status: DISCONTINUED | OUTPATIENT
Start: 2024-09-11 | End: 2024-09-11 | Stop reason: HOSPADM

## 2024-09-11 RX ORDER — FLUMAZENIL 0.1 MG/ML
0.2 INJECTION, SOLUTION INTRAVENOUS
Status: DISCONTINUED | OUTPATIENT
Start: 2024-09-11 | End: 2024-09-11 | Stop reason: HOSPADM

## 2024-09-11 RX ORDER — HEPARIN SODIUM,PORCINE 10 UNIT/ML
1-5 VIAL (ML) INTRAVENOUS ONCE
Status: COMPLETED | OUTPATIENT
Start: 2024-09-11 | End: 2024-09-11

## 2024-09-11 RX ORDER — HEPARIN SODIUM,PORCINE 10 UNIT/ML
5-20 VIAL (ML) INTRAVENOUS EVERY 24 HOURS
OUTPATIENT
Start: 2024-09-11

## 2024-09-11 RX ADMIN — MIDAZOLAM 1 MG: 1 INJECTION INTRAMUSCULAR; INTRAVENOUS at 10:54

## 2024-09-11 RX ADMIN — MIDAZOLAM 1 MG: 1 INJECTION INTRAMUSCULAR; INTRAVENOUS at 11:01

## 2024-09-11 RX ADMIN — OXYCODONE HYDROCHLORIDE 10 MG: 5 SOLUTION ORAL at 08:21

## 2024-09-11 RX ADMIN — FENTANYL CITRATE 50 MCG: 50 INJECTION INTRAMUSCULAR; INTRAVENOUS at 11:00

## 2024-09-11 RX ADMIN — FENTANYL CITRATE 50 MCG: 50 INJECTION INTRAMUSCULAR; INTRAVENOUS at 10:53

## 2024-09-11 RX ADMIN — OXYCODONE HYDROCHLORIDE 10 MG: 5 SOLUTION ORAL at 03:28

## 2024-09-11 RX ADMIN — MIDAZOLAM 2 MG: 1 INJECTION INTRAMUSCULAR; INTRAVENOUS at 10:33

## 2024-09-11 RX ADMIN — CARVEDILOL 12.5 MG: 12.5 TABLET, FILM COATED ORAL at 08:21

## 2024-09-11 RX ADMIN — HEPARIN, PORCINE (PF) 10 UNIT/ML INTRAVENOUS SYRINGE 5 ML: at 11:07

## 2024-09-11 RX ADMIN — CLINDAMYCIN PHOSPHATE 900 MG: 900 INJECTION, SOLUTION INTRAVENOUS at 10:25

## 2024-09-11 RX ADMIN — ENOXAPARIN SODIUM 80 MG: 80 INJECTION SUBCUTANEOUS at 08:21

## 2024-09-11 RX ADMIN — DEXTROAMPHETAMINE SACCHARATE, AMPHETAMINE ASPARTATE, DEXTROAMPHETAMINE SULFATE AND AMPHETAMINE SULFATE 20 MG: 5; 5; 5; 5 TABLET ORAL at 08:21

## 2024-09-11 RX ADMIN — ACETAMINOPHEN 650 MG: 325 SOLUTION ORAL at 02:56

## 2024-09-11 RX ADMIN — OXYCODONE HYDROCHLORIDE 10 MG: 5 SOLUTION ORAL at 12:09

## 2024-09-11 RX ADMIN — FENTANYL CITRATE 100 MCG: 50 INJECTION INTRAMUSCULAR; INTRAVENOUS at 10:32

## 2024-09-11 RX ADMIN — SUCRALFATE 1 G: 1 SUSPENSION ORAL at 02:54

## 2024-09-11 RX ADMIN — LIDOCAINE HYDROCHLORIDE 5 ML: 10 INJECTION, SOLUTION EPIDURAL; INFILTRATION; INTRACAUDAL; PERINEURAL at 10:57

## 2024-09-11 ASSESSMENT — ACTIVITIES OF DAILY LIVING (ADL)
ADLS_ACUITY_SCORE: 28

## 2024-09-11 NOTE — PLAN OF CARE
Goal Outcome Evaluation:    VS: /79 (BP Location: Right arm)   Pulse 85   Temp 98.6  F (37  C) (Oral)   Resp 17   Wt 88.8 kg (195 lb 11.2 oz)   SpO2 98%   BMI 28.08 kg/m       O2: Stable on room air >90%   Output: Voids without difficulties   Last BM: No BM this shift    Activity: Independent   Up for meals? NPO for IR procedure   Skin: Generalized bruises   Pain: Reports pain @ 7/10   CMS: AO x4   Dressing: None   Diet: NPO   LDA: None   Equipment:    Plan: Discharge after CVC placement    Additional Info:      8MS DISCHARGE    D: Patient discharged home at 1300. Patient accompanied by spouse.    I: Discharge instructions reviewed with patient. Patient instructed to seek care if experiencing worsening symptoms, such as pain or elevated fever. Other phone numbers to call with questions or concerns after discharge reviewed. LLE removed. Education completed.    A: Patient verbalized understanding of discharge medications and instructions. Prescribed home medications given to patient.  Belongings returned to patient.    P: Patient to follow-up on Oct 14 with PCP.

## 2024-09-11 NOTE — IR NOTE
Interventional Radiology Intra-procedural Nursing Note    Patient Name: Parker Acevedo  Medical Record Number: [id number]  Today's Date: September 11, 2024    Start Time: 1030  End of procedure time: 1105  Procedure: Tunneled CVC placement    Other Notes: Patient arrived in IR without PIV for sedation. RN placed PIV with ultrasound and prepped patient for sedation. Patient cooperative and consent signed.  200mcg fentanyl  4mg ling Jimenez RN

## 2024-09-11 NOTE — PROCEDURES
[facility]    Procedure: IR Procedure Note    Date/Time: 9/11/2024 11:26 AM    Performed by: Juana Burgos PA-C  Authorized by: Juana Burgos PA-C  IR Fellow Physician:    Pre Procedure Diagnosis: short gut syndrome; TPN dependent  Post Procedure Diagnosis: same    UNIVERSAL PROTOCOL   Site Marked: NA  Prior Images Obtained and Reviewed:  Yes  Required items: Required blood products, implants, devices and special equipment available    Patient identity confirmed:  Verbally with patient, arm band, provided demographic data and hospital-assigned identification number  Patient was reevaluated immediately before administering moderate or deep sedation or anesthesia  Confirmation Checklist:  Patient's identity using two indicators, relevant allergies, procedure was appropriate and matched the consent or emergent situation and correct equipment/implants were available  Time out: Immediately prior to the procedure a time out was called    Universal Protocol: the Joint Commission Universal Protocol was followed    Preparation: Patient was prepped and draped in usual sterile fashion       ANESTHESIA    Anesthesia:  Local infiltration  Local Anesthetic:  Lidocaine 1% without epinephrine      SEDATION  Patient Sedated: Yes    Vital signs: Vital signs monitored during sedation    See dictated procedure note for full details.  Findings: Patient tolerated well. See IR nursing note.    Specimens: none    Procedural Complications: None    Condition: Stable    Plan: Follow-up per primary team.      PROCEDURE  Describe Procedure: Image-guided placement of left single lumen TCVC via left collateral draining into the innominate vein.   Patient Tolerance:  Patient tolerated the procedure well with no immediate complications  Length of time physician/provider present for 1:1 monitoring during sedation:  0 min

## 2024-09-11 NOTE — PROGRESS NOTES
Shift 6820-5573:Past history of Gastric Bypass,malnutrition, Chronic TPN,Chronic pain Admitted with +blood Culture  Summary:CVC placement on 9/11 Npo after Mn  VS:       Pt A/O X 4. Afebrile. VSS. Lungs- Clear bilaterally             Output:       Bowels- + in all four quadrants. Voids spontaneously without   difficulty in the toilet.      Activity:       Pt up independently .     Skin:   No skin issues .     Pain:       Has pain in the abdomen Pt has been given Oxycodone Prn .   Pain rated 8/10   CMS:       CMS and Neuro's are intact. Denies numbness and tingling in all extremities.      Dressing:     No dressings      Diet:       Pt is Npo after Mn .       LDA:       PIV is patent in the Left arm       Equipment:       Pt refused PCD's on BLE's. Bilateral heels are elevated off the bed.Pt is on Lovenox     Plan:       Pt is able to make needs known and the call light is within the pt's reach. Continue to monitor.       Additional Info:        .

## 2024-09-11 NOTE — PHARMACY
[facility]  Parenteral ANtibiotic Review at Departure from Acute Care Collaborative Note     Patient: Parker Acevedo  MRN: [id number]  Allergies: Bactrim [sulfamethoxazole-trimethoprim], Penicillins, Ertapenem, Doxycycline, and Vancomycin    Current Location: [facility]  OPAT to be provided by: Other    [facility]  Line Type: Other (CVC)    Diagnosis/Indications: E.coli bacteremia likely associated with Cm catheter s/p removal on 9/5/2024  Organism(s): E.coli  MRDO? No  Pending Cultures/Microbiological Tests: yes 9/6 blood culture    Inpatient ID involved in developing OPAT plan: Yes - discharge OPAT plan has no changes from ID provider, Dr. Estelle Gilliam, OPAT plan charted on 9/6/2024    Outpatient ID Follow-up: Referred to another provider for follow-up  Designated Provider: Dr. Chuy Isaac (PCP)    Antimicrobial Regimen / Route Anticipated  Duration Start Date Stop /  Reassess Date   Ceftriaxone 2 g every 24 hours/IV 7 days 9/6/2024 (first negative culture after line removal) 9/12/2024     Laboratory Tests and Monitoring Frequency:  (No laboroatory monitoring warranted for outpatient IV antibiotic therapy with intended outpatient ceftriaxone < 5 days)      Imaging/Miscellaneous Monitoring: None    ID Pharmacist Interventions: Therapy Duration - Confirmed with [employer] that patient is receiving enough antibiotics to go through 9/12 specified stop date     Alex Michael PharmD   Pager: [phone]

## 2024-09-11 NOTE — PROGRESS NOTES
"Care Management Discharge Note    Discharge Date: 09/11/2024       Discharge Disposition: Home Infusion (resumption of TPN and initiation of new IV abx); Home Care ([facility] for nursing r/t TPN infusions and new IV abx)     Discharge Services:  [facility] for home infusion services; [facility] for home care r/t infusion    Discharge DME: No new DME anticipated    Discharge Transportation: family or friend will provide    Private pay costs discussed: Not applicable    Does the patient's insurance plan have a 3 day qualifying hospital stay waiver?  Yes     Which insurance plan 3 day waiver is available? Alternative insurance waiver    Will the waiver be used for post-acute placement? No    PAS Confirmation Code: N/A  Patient/family educated on Medicare website which has current facility and service quality ratings: no (N/A)    Education Provided on the Discharge Plan: Yes  Persons Notified of Discharge Plans: pt, provider, nursing staff, [employer], [facility]  Patient/Family in Agreement with the Plan: yes    Handoff Referral Completed: Yes, [facility] PCP: Internal handoff referral completed    Additional Information:    10:00am - Case discussed in rounds this AM.  Per provider, line will be placed today and pt will discharge.  Orders will be completed when applicable.    Sent signed orders to [facility] via Epic HC tab.  [facility] responded that they intend to see pt tomorrow.    [facility] previously received orders and delivered all necessary TPN, IV abx, and associated supplies.    No further care management intervention anticipated at this time.  Please re-consult if further needs arise.  Care management signing off.            GERRI Berry  [facility]  Units 8M/S & 10 ICU  Reachable on [employer] - Search by name or search \"RNCC\"  Phone: [phone]    "

## 2024-09-12 ENCOUNTER — PATIENT OUTREACH (OUTPATIENT)
Dept: CARE COORDINATION | Facility: CLINIC | Age: 54
End: 2024-09-12
Payer: COMMERCIAL

## 2024-09-12 LAB — BACTERIA SPEC CULT: NORMAL

## 2024-09-12 NOTE — LETTER
[facility] CARE COORDINATION  [address] [address]    September 13, 2024    Parker Acevedo  [address] [address] [address]      Dear Parker,    I am a clinic care coordinator who works with Chuy Isaac MD with the [facility]. I wanted to introduce myself and provide you with my contact information for you to be able to call me with any questions or concerns. Below is a description of clinic care coordination and how I can further assist you.       The clinic care coordination team is made up of a registered nurse, , financial resource worker and community health worker who understand the health care system. The goal of clinic care coordination is to help you manage your health and improve access to the health care system. Our team works alongside your provider to assist you in determining your health and social needs. We can help you obtain health care and community resources, providing you with necessary information and education. We can work with you through any barriers and develop a care plan that helps coordinate and strengthen the communication between you and your care team.  Our services are voluntary and are offered without charge to you personally.    Please feel free to contact me with any questions or concerns regarding care coordination and what we can offer.      We are focused on providing you with the highest-quality healthcare experience possible.    Sincerely,     Kinsey Muhammad RN Care Coordination   [facility], [place], [place]  Phone: [phone]

## 2024-09-12 NOTE — PROGRESS NOTES
Clinic Care Coordination Contact  [facility]/Voicemail    Clinical Data: Care Coordinator Outreach    Outreach Documentation Number of Outreach Attempt   9/12/2024  12:55 PM 1       Left message on patient's voicemail with call back information and requested return call.    Plan: Care Coordinator will try to reach patient again in 1-2 business days.    Kinsey Muhammad RN Care Coordination   [facility]  Email: [email or web address]  Phone: [phone]

## 2024-09-13 DIAGNOSIS — G89.29 CHRONIC ABDOMINAL PAIN: ICD-10-CM

## 2024-09-13 DIAGNOSIS — M54.50 CHRONIC BILATERAL LOW BACK PAIN WITHOUT SCIATICA: ICD-10-CM

## 2024-09-13 DIAGNOSIS — R19.8 VISCERAL HYPERALGESIA: ICD-10-CM

## 2024-09-13 DIAGNOSIS — G89.4 CHRONIC PAIN SYNDROME: ICD-10-CM

## 2024-09-13 DIAGNOSIS — F11.90 CHRONIC, CONTINUOUS USE OF OPIOIDS: ICD-10-CM

## 2024-09-13 DIAGNOSIS — R10.9 CHRONIC ABDOMINAL PAIN: ICD-10-CM

## 2024-09-13 DIAGNOSIS — G89.29 CHRONIC BILATERAL LOW BACK PAIN WITHOUT SCIATICA: ICD-10-CM

## 2024-09-13 RX ORDER — FENTANYL 25 UG/1
1 PATCH TRANSDERMAL
Qty: 15 PATCH | Refills: 0 | Status: SHIPPED | OUTPATIENT
Start: 2024-09-13

## 2024-09-13 RX ORDER — OXYCODONE HCL 5 MG/5 ML
5-10 SOLUTION, ORAL ORAL EVERY 4 HOURS PRN
Qty: 1200 ML | Refills: 0 | Status: SHIPPED | OUTPATIENT
Start: 2024-09-13

## 2024-09-13 ASSESSMENT — ACTIVITIES OF DAILY LIVING (ADL): DEPENDENT_IADLS:: TRANSPORTATION

## 2024-09-13 NOTE — TELEPHONE ENCOUNTER
Received request for a refill(s) of oxyCODONE (ROXICODONE) 5 MG/5ML solution   And  fentaNYL (DURAGESIC) 25 mcg/hr 72 hr patch     Last dispensed from pharmacy on 08/23/24    Patient's last office/virtual visit by prescribing provider on 06/24/24  Next office/virtual appointment scheduled for 10/14/24    Last urine drug screen date 11/01/23  Current opioid agreement on file (completed within the last year) Yes Date of opioid agreement: 11/01/23    E-prescribe to pharmacy-[facility] - [place], [place] - [address]      Will route to nursing Peytona for review and preparation of prescription(s).

## 2024-09-13 NOTE — TELEPHONE ENCOUNTER
Medication refill information reviewed.     Due date for oxyCODONE (ROXICODONE) 5 MG/5ML solution   And  fentaNYL (DURAGESIC) 25 mcg/hr 72 hr patch  is 9/24/2024     Prescriptions prepped for review.     Will route to provider.

## 2024-09-13 NOTE — TELEPHONE ENCOUNTER
Signed Prescriptions:                        Disp   Refills    fentaNYL (DURAGESIC) 25 mcg/hr 72 hr patch 15 pat*0        Sig: Place 1 patch onto the skin every 48 hours. 30 day           supply for chronic pain. OK to fill 9/22/24 and           start 9/24/24  Authorizing Provider: NATALIE MCDOWELL    oxyCODONE (ROXICODONE) 5 MG/5ML solution   1200 mL0        Sig: Take 5-10 mLs (5-10 mg) by mouth every 4 hours as           needed for moderate to severe pain. Max of           40mg/day. 30 day supply for chronic pain. OK to           fill 9/22/24 and start 9/24/24  Authorizing Provider: NATALIE MCDOWELL        Reviewed [place]  September 13, 2024- no concerning fills.    Natalie MENARD, RN CNP, FNP  [facility]  [facility]

## 2024-09-16 NOTE — DISCHARGE SUMMARY
"[facility]  Hospitalist Discharge Summary      Date of Admission:  9/3/2024  Date of Discharge:  9/11/2024  2:29 PM  Discharging Provider: Bartolo Waller MD  Discharge Service: Hospitalist Service, GOLD TEAM 21    Discharge Diagnoses     # CLABSI, prior to admit  # Indwelling Cm catheter for TPN, removed  # History of obesity, s/p Celestino-en-Y gastric bypass  # Short gut syndrome  # GERD  # Anemia  # Chronic pain   # ADHD   # Paroxysmal Afib  # DVT     Clinically Significant Risk Factors     # Moderate Malnutrition: based on nutrition assessment      Follow-ups Needed After Discharge   Follow-up Appointments     Adult [facility] Follow-up and recommended labs and tests      Follow up with primary care provider, Chuy Isaac, within 7 days for   hospital follow- up.  The following labs/tests are recommended: CBC CMP.         Appointments on [place] and/or [facility] (with [facility] or [facility]   provider or service). Call [phone] if you haven't heard regarding   these appointments within 7 days of discharge.            Unresulted Labs Ordered in the Past 30 Days of this Admission       No orders found from 8/4/2024 to 9/4/2024.        These results will be followed up by n/a    Discharge Disposition   Discharged to home  Condition at discharge: Stable    Hospital Course     HPI:  53 year old male who has a past medical history of past medical history of Celestino-en-Y gastric bypass, malnutrition, chronic TPN, chronic pain, paroxysmal afib, anemia, ADHD and DVT. He presents to the ED due to positive blood culture at clinic. He has had numerous previous admissions for line infections and states that he has \"had over 200 lines placed\" since his gastric bypass. He has chronic abdominal pain that is maintained on fentanyl patch and oxycodone. He currently states that he feels well, but was concerned that he had a fever with his infusion. He states that his other medical " conditions are well managed.      Patient was admitted to Hospitalist service for further evaluation and treatment of line replacement. After initiating IV abtx and repeat BCX patient appeared to have clinical improvement of line infection. After consultation with ID, decided to remove tunneled CVC and give line holiday, which last for several days due to unforseen delays in CVC placement. Ultimately CVC placed 9/11/24 without difficulty and patient discharged to complete IV abtx x7 days s/p negative BCX         Consultations This Hospital Stay   INFECTIOUS DISEASE GENERAL ADULT IP CONSULT  PHARMACY/NUTRITION TO START AND MANAGE TPN  INFECTIOUS DISEASE GENERAL ADULT IP CONSULT  INTERVENTIONAL RADIOLOGY ADULT/PEDS IP CONSULT  PHARMACY IP CONSULT  PHARMACY IP CONSULT  PHARMACY IP CONSULT  CARE MANAGEMENT / SOCIAL WORK IP CONSULT  NURSING TO CONSULT FOR VASCULAR ACCESS CARE IP CONSULT  VASCULAR ACCESS ADULT IP CONSULT  INTERVENTIONAL RADIOLOGY ADULT/PEDS IP CONSULT  INTERVENTIONAL RADIOLOGY ADULT/PEDS IP CONSULT  INTERVENTIONAL RADIOLOGY ADULT/PEDS IP CONSULT  NURSING TO CONSULT FOR VASCULAR ACCESS CARE IP CONSULT    Code Status   Full Code    Time Spent on this Encounter   I, Bartolo Waller MD, personally saw the patient today and spent less than or equal to 30 minutes discharging this patient.       Bartolo Waller MD  [facility] [address]  [address] [place] [id number]  Phone: [phone]  Fax: [phone]  ______________________________________________________________________    Physical Exam   Vital Signs:                    Weight: 195 lbs 11.2 oz  /88 (BP Location: Left arm, Patient Position: Sitting, Cuff Size: Adult Regular)   Pulse 64   Temp 97.3  F (36.3  C) (Oral)   Resp 16   Wt 88.8 kg (195 lb 11.2 oz)   SpO2 94%   BMI 28.08 kg/m    EXAM  General: well appearing man standing up next to bed in NAD, pleasant and conversant  Head: NC, AT  Eye: symm gaze, anicteric sclerae  ENT: patent  nares wo drainage/epistaxis  Pulm: CTAB, comfortable WOB on RA  CV: normal rate, regular rhythm, tunneled CVC covered with clean dry dressing, nontender  GI: soft, NTND, stable reducible midline hernia  Neuro: awake, alert, hearing speech and phonation, intact grossly     SUBJ:  ON ROLANDA  Feeling fine today  Ready to go home  Pt wife here as well, all questions answered  Gave education, anticipatory guidance re: CVC infection and return precautions (including, but not limited to) fever, AMS, shakes or sweats, or failure to improve               Primary Care Physician   Chuy Isaac    Discharge Orders      Home Infusion Referral      Primary Care - Care Coordination Referral      Resume Home Care Services    [facility]: Please resume prior to admission services of nursing and TPN.  There are no changes to TPN formula, please resume prior to admission formulation.  Initiate IV antibiotics, per provider orders.     Resume Home Care Services    [facility]: Please resume prior to admission nursing services and collaboration with [facility].  Patient will have resumption of TPN and new IV antibiotics at discharge.     Reason for your hospital stay    bacteremia     Activity    Your activity upon discharge: activity as tolerated and ambulate in house     Adult [facility] Follow-up and recommended labs and tests    Follow up with primary care provider, Chuy Isaac, within 7 days for hospital follow- up.  The following labs/tests are recommended: CBC CMP.       Appointments on [place] and/or [facility] (with [facility] or [facility] provider or service). Call [phone] if you haven't heard regarding these appointments within 7 days of discharge.     Full Code     Diet    Follow this diet upon discharge: regular diet, TPN daily       Significant Results and Procedures   Most Recent 3 CBC's:  Recent Labs   Lab Test 09/11/24  0727 09/10/24  0904 09/09/24  0736   WBC 5.9 5.8 6.2   HGB 10.6* 11.3* 11.3*   MCV 93 92 94     249 254     Most Recent 3 BMP's:  Recent Labs   Lab Test 09/11/24  0727 09/11/24  0606 09/11/24  0228 09/10/24  0908 09/10/24  0904 09/09/24  0851 09/09/24  0736     --   --   --  140  --  140   POTASSIUM 4.0  --   --   --  3.8  --  3.9   CHLORIDE 105  --   --   --  106  --  105   CO2 29  --   --   --  26  --  27   BUN 13.0  --   --   --  13.0  --  13.5   CR 0.95  --   --   --  0.79  --  0.86   ANIONGAP 6*  --   --   --  8  --  8   SLIAS 8.3*  --   --   --  8.3*  --  8.8   GLC 93 118* 119*   < > 87   < > 92    < > = values in this interval not displayed.     Most Recent 2 LFT's:  Recent Labs   Lab Test 09/04/24  1005 09/04/24  0000   AST 18 18   ALT 14 14   ALKPHOS 93 100   BILITOTAL 0.4 0.3     Most Recent 3 INR's:  Recent Labs   Lab Test 09/10/24  1140 08/12/24  0924 01/22/24  0911   INR 1.04 1.2* 1.10   Most Recent 3 Creatinines:  Recent Labs   Lab Test 09/11/24  0727 09/10/24  0904 09/09/24  0736   CR 0.95 0.79 0.86     Most Recent 3 Hemoglobins:  Recent Labs   Lab Test 09/11/24  0727 09/10/24  0904 09/09/24  0736   HGB 10.6* 11.3* 11.3*     Most Recent 3 Troponin's:  Recent Labs   Lab Test 10/02/19  1907 10/02/19  1128 10/03/18  1941   TROPI <0.015 <0.015 <0.015     Most Recent 3 BNP's:  Recent Labs   Lab Test 05/07/22  1423 02/23/22  2207   NTBNPI 157 71     Most Recent D-dimer:  Recent Labs   Lab Test 04/25/18  2335   DD 0.3     Most Recent Cholesterol Panel:  Recent Labs   Lab Test 08/13/24  1450 09/06/18  1336 08/20/18  1442   CHOL  --   --  109   TRIG 190*   < > 71    < > = values in this interval not displayed.   Most Recent TSH and T4:  Recent Labs   Lab Test 07/07/22  1231   TSH 4.06   Most Recent 6 glucoses:  Recent Labs   Lab Test 09/11/24  0727 09/11/24  0606 09/11/24  0228 09/10/24  2204 09/10/24  1502 09/10/24  0908   GLC 93 118* 119* 111* 92 82     Most Recent Urinalysis:  Recent Labs   Lab Test 01/22/24  0048 09/19/17  0242 08/01/17  1133   COLOR Light Yellow   < > Yellow   APPEARANCE Clear    < > Clear   URINEGLC Negative   < > Negative   URINEBILI Negative   < > Negative   URINEKETONE Negative   < > Negative   SG 1.021   < > 1.015   UBLD Negative   < > Trace*   URINEPH 6.0   < > 6.5   PROTEIN Negative   < > Negative   UROBILINOGEN  --   --  0.2   NITRITE Negative   < > Negative   LEUKEST Negative   < > Negative   RBCU 2   < > O - 2   WBCU 1   < > O - 2    < > = values in this interval not displayed.     Most Recent ABG:No lab results found.  Most Recent ESR & CRP:  Recent Labs   Lab Test 08/13/24  1450 07/18/23  1230 07/04/23  1556 05/09/23  1230 10/04/22  1440   SED  --   --  17  --   --    CRP  --   --   --   --  <2.9   CRPI <3.00   < > 89.60*   < >  --     < > = values in this interval not displayed.     Most Recent Anemia Panel:  Recent Labs   Lab Test 09/11/24  0727 06/03/24  1645 05/31/24  1519 07/08/22  0552 07/08/22  0459 07/07/22  2213 02/28/22  0646 02/27/22  1007   WBC 5.9   < > 6.4   < >  --  8.4   < >  --    HGB 10.6*   < > 11.0*   < >  --  10.5*   < > 10.6*   HCT 34.1*   < > 34.5*   < >  --  32.0*   < >  --    MCV 93   < > 93   < >  --  96   < >  --       < > 153   < >  --  99*   < >  --    IRON  --   --   --   --   --   --   --  88   IRONSAT  --   --   --   --   --   --   --  27   RETICABSCT  --   --   --   --   --  0.039   < >  --    RETP  --   --   --   --   --  1.2   < >  --    FEB  --   --   --   --   --   --   --  325   BROOKLYNN  --   --  40   < >  --   --    < > 28   B12  --   --   --   --  481  --   --  319   FOLIC  --   --   --   --   --   --   --  15.3    < > = values in this interval not displayed.   ,   Results for orders placed or performed during the hospital encounter of 09/03/24   IR CVC Tunnel Removal Left    Narrative    DIAGNOSIS: bacteremia    PROCEDURE: IR CVC TUNNEL REMOVAL LEFT       Impression    IMPRESSION: Completed removal of tunneled central venous catheter in  its entirety. There were no complications. Catheter tip sent to lab.    ----------    CLINICAL  HISTORY: The patient has a tunneled central venous catheter  placed for TPN. Patient presents for removal as it is no longer  needed.    PERFORMED BY: Aiyana Burgos PA-C    MEDICATIONS: none    DESCRIPTION: Physical examination demonstrated no erythema,  tenderness, fluctuance, or discharge at the catheter exit site or  along the tract. The catheter and its entry site into the skin was  prepped and draped in usual sterile fashion.     Using steady gentle traction, the catheter and cuff were freed from  the subcutaneous tissue, and the entire catheter was removed without  difficulty. Compression was applied over the venipuncture site, as  well as along the tract, until good hemostasis was achieved. A sterile  dressing was applied to the skin at the exit site.    COMPLICATIONS:  No immediate concerns; the patient remained stable  throughout the procedure and tolerated it well.    ESTIMATED BLOOD LOSS:  Minimal    SPECIMENS: catheter tip    TIME:  A total of 15 minutes was spent with the patient. Greater than  50% of the time was spent in counseling, education, and coordination  of care.    ESTEFANI BURGOS PA-C         SYSTEM ID:  [id number]   IR CVC Tunnel Placement > 5 Yrs of Age    Narrative    PRE-PROCEDURE DIAGNOSIS: short gut syndrome    PROCEDURE: IR CVC TUNNEL PLACEMENT > 5 YRS OF AGE    Impression    IMPRESSION: Completed image-guided placement of 5 [place], 26 cm single  lumen tunneled central venous catheter via left collateral vein  draining into the innominate vein. Left EJ was attempted with contrast  injection revealing no route leading to the right atrium. Catheter tip  in high right atrium. Aspirates and flushes freely, heparin locked and  ready for immediate use. No complication.     ----------    CLINICAL HISTORY: Patient is a 53 y.o. male with short gut syndrome  who requires central venous access for therapy for long-term TPN.  Tunneled central venous catheter placement requested. He recently  had  his left TCVC removed due to bacteremia.     PERFORMED BY: Aiyana Burgos PA-C and Dr. Fong    CONSENT: Written informed consent was obtained and is documented in  the patient record.    MEDICATIONS:   1. 4 mg midazolam IV  2. 200 mcg fentanyl IV  3. 10 mL 1% lidocaine for local anesthesia  4. 200 units heparin per lumen    NURSING: Patient continuously monitored by trained, independent  observer.    SEDATION TIME: 35 minutes face-to-face    FLUOROSCOPY TIME: 1.9 minutes    DESCRIPTION: The left neck and upper chest were prepped and draped in  the usual sterile fashion.      Under ultrasound guidance, the left external jugular vein was  identified and the overlying skin was anesthetized was made. Under  ultrasound guidance, left external jugular venipuncture was made with  needle. Image saved documenting venipuncture and patency. Wire would  not pass appropriately and contrast was flushed revealing no route  towards the right atrium. This access was aborted. Next a left  collateral vein was identified with the overlying skin anesthetized.  Under US-guidance a venipuncture was made and image saved.     Needle was exchanged over guidewire for a dilator under fluoroscopic  guidance. Length to right atrium was measured with guidewire.  Guidewire and inner dilator were removed. Wire was advanced into  inferior vena cava under fluoroscopic guidance and secured.     The anterior chest skin was anesthetized and incision was made after  measurements were taken. A cuffed catheter was subcutaneously tunneled  from the anterior chest incision to the internal jugular venipuncture  site after path of tunnel was anesthetized. The dilator was exchanged  over guidewire for a peel-away sheath. Guidewire was removed. Under  fluoroscopic guidance, the catheter was placed through the peel-away  sheath. Peel-away sheath was removed.      Final catheter position saved. The catheter lumen was adequately  aspirated and flushed. Each  lumen was heparin locked. A catheter  retaining suture and sterile dressing were applied. The skin  dermatotomy site overlying the internal jugular venipuncture was  closed with topical adhesive.    COMPLICATIONS: No immediate concerns, the patient remained stable  throughout the procedure and tolerated it well.    ESTIMATED BLOOD LOSS: Minimal    SPECIMENS: None    ESTEFANI ALLEN PA-C         SYSTEM ID:  [id number]     *Note: Due to a large number of results and/or encounters for the requested time period, some results have not been displayed. A complete set of results can be found in Results Review.       Discharge Medications   Discharge Medication List as of 9/11/2024 11:52 AM        CONTINUE these medications which have CHANGED    Details   cefTRIAXone (ROCEPHIN) 2 GM vial Inject 2 g over 30 minutes into the vein at bedtime for 1 day., Transitional           CONTINUE these medications which have NOT CHANGED    Details   albuterol (VENTOLIN HFA) 108 (90 Base) MCG/ACT inhaler Inhale 2 puffs into the lungs every 6 hours as needed, Disp-18 g, R-1, E-PrescribePharmacy may dispense brand covered by insurance (Proair, or proventil or ventolin or generic albuterol inhaler)      ALPRAZolam (XANAX) 0.5 MG tablet TAKE ONE TABLET BY MOUTH TWICE A DAY AS NEEDED FOR SLEEP OR ANXIETY, Disp-60 tablet, R-0, E-Prescribe      amphetamine-dextroamphetamine (ADDERALL) 20 MG tablet TAKE ONE TABLET BY MOUTH ONCE DAILY, Disp-30 tablet, R-0, E-Prescribe      carvedilol (COREG) 6.25 MG tablet TAKE 2 TABLETS (12.5 MG) BY MOUTH 2 TIMES DAILY WITH MEALS, Disp-360 tablet, R-0, E-Prescribe      cyanocobalamin (CYANOCOBALAMIN) 1000 MCG/ML injection Inject 1 mL (1,000 mcg) into the muscle every 30 days, Disp-1 mL, R-3, E-Prescribe      enoxaparin ANTICOAGULANT (LOVENOX) 80 MG/0.8ML syringe Inject 0.8 mLs (80 mg) Subcutaneous 2 times daily INJECT THE CONTENTS OF ONE SYRINGE (80MG) UNDER THE SKIN TWO TIMES A DAY, Disp-67.2 mL, R-0,  E-Prescribe      lipids plant base (CLINOLIPID) 20 % infusion Inject 250 mLs into the vein every 24 hours, Disp-1000 mL, R-3, E-Prescribe      naloxone (NARCAN) 4 MG/0.1ML nasal spray Spray 1 spray (4 mg) into one nostril alternating nostrils as needed for opioid reversal every 2-3 minutes until assistance arrives, Disp-0.2 mL, R-3, E-Prescribe      nystatin (MYCOSTATIN) 085321 UNIT/GM external cream Apply topically daily as needed for other (around area of J tube)Historical      ondansetron (ZOFRAN ODT) 8 MG ODT tab DISSOLVE ONE TABLET BY MOUTH EVERY 8 HOURS AS NEEDED FOR NAUSEA, Disp-90 tablet, R-1, E-Prescribe      parenteral nutrition - PTA/DISCHARGE ORDER Infuse daily. Managed by [facility] [phone]  Total volume: 1260 mL  Cycled over 12 hr  Clinisol 15% 100 gram/day  Dextrose 70% 175 gram/day  Na Acetate 35 mEq/day  K Chloride 85 mEq/day  Mg sulfate 6 mEq/day  Ca gluconate 25 mEq/day  MVI 10 mL/ day  Trace 1 mL/day  Zinc 5 mg /day  Famotidine 20 mg/day  Cl:Ace=1: 2.4  Intralipid 20% 50 gram/day ([facility] only), Historical      polyethylene glycol (MIRALAX) 17 GM/Dose powder Take 17 g by mouth as needed for constipation, Historical      sucralfate (CARAFATE) 1 GM/10ML suspension Take 10 mLs (1 g) by mouth 4 times daily as needed for nausea., Disp-414 mL, R-1, E-Prescribe      vitamin D2 (ERGOCALCIFEROL) 87728 units (1250 mcg) capsule Take 1 capsule (50,000 Units) by mouth once a week, Disp-12 capsule, R-3, E-Prescribe      fentaNYL (DURAGESIC) 25 mcg/hr 72 hr patch Place 1 patch onto the skin every 48 hours 30 day supply for chronic pain. OK to fill 8/23/24 and start 8/25/24, Disp-15 patch, R-0, E-Prescribe      oxyCODONE (ROXICODONE) 5 MG/5ML solution Take 5-10 mLs (5-10 mg) by mouth every 4 hours as needed for moderate to severe pain Max of 40mg/day. 30 day supply for chronic pain. OK to fill 8/23/24 and start 8/25/24, Disp-1200 mL, R-0, E-Prescribe      Syringe/Needle, Disp, (B-D ECLIPSE SYRINGE) 27G X  "1/2\" 1 ML MISC 1 Device every 30 days., Disp-30 each, R-1, E-Prescribe           Allergies   Allergies   Allergen Reactions    Bactrim [Sulfamethoxazole-Trimethoprim] Rash    Penicillins Anaphylaxis     Please see Antimicrobial Management Team allergy assessment note 10/10/2018. Patient reported tolerating amoxicillin.  Tolerating cefepime and ceftriaxone without reaction 6/23    Ertapenem Nausea and Vomiting    Doxycycline Rash    Vancomycin Rash     Rash after receiving vancomycin 3/28/16 (infusion reaction?). Tolerated with slower infusion and diphenhydramine premed.  Tolerated 1250mg over 90minutes 7/2023.     "

## 2024-09-26 ENCOUNTER — MYC REFILL (OUTPATIENT)
Dept: INTERNAL MEDICINE | Facility: CLINIC | Age: 54
End: 2024-09-26
Payer: COMMERCIAL

## 2024-09-26 DIAGNOSIS — F41.9 CHRONIC ANXIETY: ICD-10-CM

## 2024-09-26 DIAGNOSIS — F90.0 ADHD (ATTENTION DEFICIT HYPERACTIVITY DISORDER), INATTENTIVE TYPE: ICD-10-CM

## 2024-09-26 DIAGNOSIS — R11.0 NAUSEA: ICD-10-CM

## 2024-09-26 RX ORDER — ONDANSETRON 8 MG/1
TABLET, ORALLY DISINTEGRATING ORAL
Qty: 90 TABLET | Refills: 1 | OUTPATIENT
Start: 2024-09-26

## 2024-09-27 RX ORDER — DEXTROAMPHETAMINE SACCHARATE, AMPHETAMINE ASPARTATE, DEXTROAMPHETAMINE SULFATE AND AMPHETAMINE SULFATE 5; 5; 5; 5 MG/1; MG/1; MG/1; MG/1
TABLET ORAL
Qty: 30 TABLET | Refills: 0 | Status: SHIPPED | OUTPATIENT
Start: 2024-09-27

## 2024-09-27 RX ORDER — ALPRAZOLAM 0.5 MG
TABLET ORAL
Qty: 60 TABLET | Refills: 0 | Status: SHIPPED | OUTPATIENT
Start: 2024-09-27

## 2024-09-27 NOTE — PLAN OF CARE
Airway    Performed by: Mercedez Dee DO  Authorized by: Mecredez Dee DO    Final Airway Type:  Endotracheal airway  Final Endotracheal Airway*:  ETT - double lumen left  ETT Double Lumen (fr)*:  39  Cuff*:  Regular  Technique Used for Successful ETT Placement:  Direct laryngoscopy (direct laryngoscopy for insertion.  Flexible bronch for bronchial cuff placement confirmation.)  Devices/Methods Used in Placement*:  Mask  Intubation Procedure*:  Preoxygenation, ETCO2, Atraumatic, Dentition Unchanged and Pharynx Clear  Insertion Site:  Oral  Blade Type*:  MAC  Blade Size*:  3  Cuff Volume (mL):  10  Bronchial Cuff Pressure (cm H2O):  3  Measured from*:  Teeth  Secured at (cm)*:  29  Placement Verified by: auscultation, bronchoscopy, capnometry, single lung ventilation and equal breath sounds    Glottic View*:  1 - full view of glottis  Attempts*:  3  Ventilation Between Attempts:  Bag valve and 2 hand mask  Location:  OR  Urgency:  Elective  Difficult Airway: No    Indications for Airway Management:  Anesthesia  Mask Difficulty Assessment:  2 - vent by mask + OA or adjuvant +/- NMBA  Start Time: 9/27/2024 8:03 AM   First attempt with [place] 2, grade 1 view, 41F double lumen tube advance through the vocal cords easily.  +ETCO2 but unusual waveform.  Removed double lumen tube, remasked with 100 mm OA.  DL x 1 with MAC 3, grade I view.  Unable to line up 39F double lumen tube adequately with vocal cords. Dr. Clark attempted with MAC 3, 39 F double lumen tube inserted, + esophageal intubation.  Double lumen tube removed.  Remasked with 100 mm OOA.  Second attempt by Dr. Clark successful with MAC 3, grade I view.  Fiberoptic used to confirm correct placement in left mainstem.  Bronchial cuff inflated under direct visualization and in good position.  Double lumen tube secured.       Goal Outcome Evaluation:      Plan of Care Reviewed With: patient

## 2024-10-13 NOTE — PROGRESS NOTES
This is a recent snapshot of the patient's [facility] medical record.  For current drug dose and complete information and questions, call [phone] or In Basket pool, [facility] home infusion ([id number])  CSN Number:  [id number]      
Yes

## 2024-10-14 ENCOUNTER — LAB (OUTPATIENT)
Dept: LAB | Facility: CLINIC | Age: 54
End: 2024-10-14
Payer: COMMERCIAL

## 2024-10-14 ENCOUNTER — OFFICE VISIT (OUTPATIENT)
Dept: PALLIATIVE MEDICINE | Facility: CLINIC | Age: 54
End: 2024-10-14
Payer: COMMERCIAL

## 2024-10-14 VITALS — DIASTOLIC BLOOD PRESSURE: 81 MMHG | HEART RATE: 88 BPM | SYSTOLIC BLOOD PRESSURE: 137 MMHG

## 2024-10-14 DIAGNOSIS — G89.4 CHRONIC PAIN SYNDROME: ICD-10-CM

## 2024-10-14 DIAGNOSIS — F11.90 CHRONIC, CONTINUOUS USE OF OPIOIDS: ICD-10-CM

## 2024-10-14 DIAGNOSIS — G89.29 CHRONIC BILATERAL LOW BACK PAIN WITHOUT SCIATICA: ICD-10-CM

## 2024-10-14 DIAGNOSIS — R19.8 VISCERAL HYPERALGESIA: Primary | ICD-10-CM

## 2024-10-14 DIAGNOSIS — Z79.891 ENCOUNTER FOR LONG-TERM USE OF OPIATE ANALGESIC: ICD-10-CM

## 2024-10-14 DIAGNOSIS — M54.50 CHRONIC BILATERAL LOW BACK PAIN WITHOUT SCIATICA: ICD-10-CM

## 2024-10-14 DIAGNOSIS — K56.1 JEJUNAL INTUSSUSCEPTION (H): ICD-10-CM

## 2024-10-14 DIAGNOSIS — R10.9 CHRONIC ABDOMINAL PAIN: ICD-10-CM

## 2024-10-14 DIAGNOSIS — G89.29 CHRONIC ABDOMINAL PAIN: ICD-10-CM

## 2024-10-14 PROCEDURE — 80307 DRUG TEST PRSMV CHEM ANLYZR: CPT | Mod: 90

## 2024-10-14 PROCEDURE — G0481 DRUG TEST DEF 8-14 CLASSES: HCPCS

## 2024-10-14 PROCEDURE — 99214 OFFICE O/P EST MOD 30 MIN: CPT | Performed by: NURSE PRACTITIONER

## 2024-10-14 PROCEDURE — G2211 COMPLEX E/M VISIT ADD ON: HCPCS | Performed by: NURSE PRACTITIONER

## 2024-10-14 RX ORDER — FENTANYL 25 UG/1
1 PATCH TRANSDERMAL
Qty: 15 PATCH | Refills: 0 | Status: SHIPPED | OUTPATIENT
Start: 2024-10-14 | End: 2024-11-06

## 2024-10-14 RX ORDER — OXYCODONE HCL 5 MG/5 ML
SOLUTION, ORAL ORAL
Qty: 1350 ML | Refills: 0 | Status: SHIPPED | OUTPATIENT
Start: 2024-10-14 | End: 2024-11-06

## 2024-10-14 ASSESSMENT — PAIN SCALES - GENERAL: PAINLEVEL: MODERATE PAIN (4)

## 2024-10-14 NOTE — PATIENT INSTRUCTIONS
Plan:   Physical Therapy: none  Clinical Health Psychologist to address issues of relaxation, behavioral change, coping style, and other factors important to improvement: none  Continue gentle movement at home  Diagnostic Studies: none  Medication Management:   Continue fentanyl patch 25mcg/hr every 48 hours, fill 10/17  and start 10/24  Oxycodone liquid 5mg/5ml, take 15mg at bedtime (15m.) and may take 10mg (10ml) every 4 hours max of 3 doses per day of 10mg. Max of 45mg (45ml) per day.  Fill 10/17 and start 10/24.   Allow early fill due to travel out of town and won't be home for a week.   Reminded not to drink alcohol while on opiate meds   Further procedures recommended: none  Recommendations/follow-up for PCP:  See above  Release of information: none  Signed CSA today  UDT last took oxycodone this afternoon at 2 pm, wearing Fentanyl. He admitted to drinking a beer yesterday  Follow up: 2 months can be in-person or virtual    ----------------------------------------------------------------  Clinic Number:  [phone]   Call with any questions about your care and for scheduling assistance.   Calls are returned Monday through Friday between 8 AM and 4:30 PM. We usually get back to you within 2 business days depending on the issue/request.    If we are prescribing your medications:  For opioid medication refills, call the clinic or send a [email or web address] message 7 days in advance.  Please include:  Name of requested medication  Name of the pharmacy.  For non-opioid medications, call your pharmacy directly to request a refill. Please allow 3-4 days to be processed.   Per [place] State Law:  All controlled substance prescriptions must be filled within 30 days of being written.    For those controlled substances allowing refills, pickup must occur within 30 days of last fill.      We believe regular attendance is key to your success in our program!    Any time you are unable to keep your appointment we ask that you call us at  least 24 hours in advance to cancel.This will allow us to offer the appointment time to another patient.   Multiple missed appointments may lead to dismissal from the clinic.

## 2024-10-14 NOTE — LETTER
Opioid / Opioid Plus Controlled Substance Agreement    This is an agreement between you and your provider about the safe and appropriate use of controlled substance/opioids prescribed by your care team. Controlled substances are medicines that can cause physical and mental dependence (abuse).    There are strict laws about having and using these medicines. We here at [facility] are committing to working with you in your efforts to get better. To support you in this work, we ll help you schedule regular office appointments for medicine refills. If we must cancel or change your appointment for any reason, we ll make sure you have enough medicine to last until your next appointment.     As a Provider, I will:  Listen carefully to your concerns and treat you with respect.   Recommend a treatment plan that I believe is in your best interest. This plan may involve therapies other than opioid pain medication.   Talk with you often about the possible benefits, and the risk of harm of any medicine that we prescribe for you.   Provide a plan on how to taper (discontinue or go off) using this medicine if the decision is made to stop its use.    As a Patient, I understand that opioid(s):   Are a controlled substance prescribed by my care team to help me function or work and manage my condition(s).   Are strong medicines and can cause serious side effects such as:  Drowsiness, which can seriously affect my driving ability  A lower breathing rate, enough to cause death  Harm to my thinking ability   Depression   Abuse of and addiction to this medicine  Need to be taken exactly as prescribed. Combining opioids with certain medicines or chemicals (such as illegal drugs, sedatives, sleeping pills, and benzodiazepines) can be dangerous or even fatal. If I stop opioids suddenly, I may have severe withdrawal symptoms.  Do not work for all types of pain nor for all patients. If they re not helpful, I may be asked to stop  them.      The risks, benefits and side effects of these medicine(s) were explained to me. I agree that:  I will take part in other treatments as advised by my care team. This may be psychiatry or counseling, physical therapy, behavioral therapy, group treatment or a referral to a specialist.     I will keep all my appointments. I understand that this is part of the monitoring of opioids. My care team may require an office visit for EVERY opioid/controlled substance refill. If I miss appointments or don t follow instructions, my care team may stop my medicine.    I will take my medicines as prescribed. I will not change the dose or schedule unless my care team tells me to. There will be no refills if I run out early.     I may be asked to come to the clinic and complete a urine drug test or complete a pill count at any time. If I don t give a urine sample or participate in a pill count, the care team may stop my medicine.    I will only receive prescriptions from this clinic for chronic pain. If I am treated by another provider for acute pain issues, I will tell them that I am taking opioid pain medication for chronic pain and that I have a treatment agreement with this provider. I will inform my [facility] care team within one business day if I am given a prescription for any pain medication by another healthcare provider. My [facility] care team can contact other providers and pharmacists about my use of any medicines.    It is up to me to make sure that I don t run out of my medicines on weekends or holidays. If my care team is willing to refill my opioid prescription without a visit, I must request refills only during office hours. Refills may take up to 3 business days to process. I will use one pharmacy to fill all my opioid and other controlled substance prescriptions. I will notify the clinic about any changes to my insurance or medication availability.    I am responsible for my prescriptions.  If the medicine/prescription is lost, stolen or destroyed, it will not be replaced. I also agree not to share controlled substance medicines with anyone.    I am aware I should not use any illegal or recreational drugs. I agree not to drink alcohol unless my care team says I can.       If I enroll in the [place] [facility] program, I will tell my care team prior to my next refill.     I will tell my care team right away if I become pregnant, have a new medical problem treated outside of my regular clinic, or have a change in my medications.    I understand that this medicine can affect my thinking, judgment and reaction time. Alcohol and drugs affect the brain and body, which can affect the safety of my driving. Being under the influence of alcohol or drugs can affect my decision-making, behaviors, personal safety, and the safety of others. Driving while impaired (DWI) can occur if a person is driving, operating, or in physical control of a car, motorcycle, boat, snowmobile, ATV, motorbike, off-road vehicle, or any other motor vehicle ([place] Statute 169A.20). I understand the risk if I choose to drive or operate any vehicle or machinery.    I understand that if I do not follow any of the conditions above, my prescriptions or treatment may be stopped or changed.    I agree that my provider, clinic care team, and pharmacy may work with any city, state or federal law enforcement agency that investigates the misuse, sale, or other diversion of my controlled medicine. I will allow my provider to discuss my care with, or share a copy of, this agreement with any other treating provider, pharmacy or emergency room where I receive care.    I have read this agreement and have asked questions about anything I did not understand.    _______________________________________________________  Patient Signature - Parker HALI Curtis _____________________                   Date      _______________________________________________________  Provider Signature - Natalie Meche Newcomer, APRN CNP   _____________________                   Date     _______________________________________________________  Witness Signature (required if provider not present while patient signing)   _____________________                   Date

## 2024-10-14 NOTE — PROGRESS NOTES
[facility]      10/14/2024    Chief complaint:   -Ongoing abdominal pain  -also has a large abdominal hernia        Interval history:  Parker Acevedo is a 53 year old male is known to me for:  Visceral hyperalgesia  Chronic abdominal pain  Chronic pain syndrome  PMHx includes: ADHD, anxiety, cardiomyopathy and nutritional diseases, chronic abdominal pain, central line associated bloodstream infection recurrent, anesthesia complication, difficulty swallowing, gastric ulcer unspecified as acute or chronic without mention of hemorrhage perforation or obstruction, gastroesophageal reflux disease, head injury, hiatal hernia, other bladder disorder, other chronic pain, postop nausea and vomiting, severe malnutrition on TPN, short gut syndrome, tobacco abuse  PSHx includes: Appendectomy, back surgery (11/3/2014), biopsy of cervical lymph node (2/20/2015), cholecystectomy, colonoscopy (2021, 2022), cervical anterior 1 level discectomy and fusion (2/15/2017), endoscopic insertion tube gastrostomy (9/9/2013), endoscopic ultrasound upper gastrointestinal tract (4/29/2011), endoscopic ultrasound upper gastrointestinal tract (9/9/2013), endoscopic ultrasound upper gastrointestinal tract (2/24/2021), endoscopy (3/25/2011, 8/4/2009, 1/5/2009), multiple EGDs, gastrectomy (6/22/2012), gastrojejunostomy (8/26/2009), umbilical hernia repair (2006), hernia raphe hiatal (6/22/2012), herniorrhaphy inguinal (11/22/2013), insert PICC line on the right (12/19/2019), insert PICC line right (2/21/2020), insert vascular access port on the right (12/19/2017, 8/2/2018), multiple interventional radiology CVC tunnel placement and removals, exploratory laparotomy (11/22/2013), orthopedic surgery, Celestino-en-Y gastric bypass (2003), tonsillectomy.        Recommendations/plan at the last visit on 6/24/2024 included:  Physical Therapy: none  Clinical Health Psychologist to address issues of relaxation, behavioral change,  coping style, and other factors important to improvement: none  Continue gentle movement at home  Diagnostic Studies: none  Medication Management:   Continue fentanyl patch 25mcg/hr every 48 hours, fill  and start   Oxycodone liquid 5mg/5ml,  use 5-10mg (5-10mls) every 4 hours as needed, max of 40mg (40ml) per day. Fill  and start   Restart Lyrica 25mg at bedtime for 7 days, then increase to 25mg twice daily.  Further procedures recommended: none  Recommendations/follow-up for PCP:  See above  Release of information: none  Follow up: 4 weeks for video visit. Please call [phone] to make your follow-up appointment with me.         Since his last visit, Parker Acevedo reports:    Interval history 2024  -did not like medical cannabis, made him have side effects  Will be needing repeat surgery for huge umbilical hernia, he could not tolerate the girdle Dr. Vyas gave him, made his feeding tube leak  Admits he spilled about 200ml of the oxycodone liquid, he has reduced his use to stretch his supply as he knows that insurance won't cover any additional.   -leaving this Friday to be out of town for a week for a , requests early refill, will not start his meds until 10/24.  He requests to be able to use 5mg more per day, he desires to use 15mg at bedtime as he has increased abdominal pain when he does his overnight feedings.       Interval history 2024  -he is getting settled in his new home.   -has a new abdominal hernia in the last month from carrying large boxes during the move.   -ongoing abdominal pain, he feels that this pain is worse due to the hernia.  -he is still on IV antibiotics given to him by home health  -he tried medical cannabis, he did not like how he felt on cannabis  -he had stopped taking Lyrica a while ago. He did find it helpful. Will re-start this for improvement of neuropathic portion of pain    Interval history 2024  -chronic neuropathic  "abdominal pain remains.   -he states his pain is worse right now.   -they are in the middle of moving and significantly downsizing. This has been quite stressful, so he believes the increase in pain is stress related.   -stable on current medication regimen     Interval history November 1, 2023  -had exploratory laparoscopy on 9/30 given jujunal intussusception   -ongoing abdominal pain, has had multiple complex abdominal surgeries over the years.   -his pain is stable on current dosing of fentanyl patch and liquid oxycodone.   -spirits are good      Pain history collected at initial visit on 5/27/2022  He had gastric bypass in 2003 and about 5 years after that, he developed gastric ulcers. He ended up having surgery for gastric ulcer and hernias and such. He has had over 11 surgeries for his abdomen. He is malnourished due to short-gut syndrome. He has a J-tube that is open to vent bile from his stomach as he gets bloated.   Worst pain is inside the abdominal cavity. He will have pain so bad that he states he screams for his mother. He feels that this is a deep inside pain.   -he would like to increase his oxycodone dose by one dose per day. His activity is limited by not having medication to cover him for his daily activities.      -he has a DVT in his right arm and in his right jugular vein. He is on lovenox.         At this point, the patient's participation with our multidisciplinary team includes:  The patient has been compliant with the program.  PT - none  Health Psych - none      Pain scores:   Pain right now is 4/10.   Pain average score is 4-6/10.   Pain range is 2-10+/10. His pain will pop up but improves over about a half-hour after taking his medication.   Pain is described as \"sharp, burning, agonizing and like on fire, has some dull right and Left UQ.\"  Pain is constant in nature    Current pain relevant medications:   -tylenol 32mg/ml liquid take 15.65mls (500mg) Q 4 hours PRN fever/mild pain  " (somewhat helpful for headaches)  --Duragesic 25mcg/hr Q 48 hours (helpful)  -lidoderm 5% patch PRN (helpful)  -Narcan nasal spray for opiate reversal   -Oxycodone 5mg/5ml solution take 5-10mls (5-10mg) TID PRN max of 40mg/day with 4 hours between doses.  (helpful)     Other pertinent medications:  -Adderall 20mg every day  -LOVENOX 120mg Subcutaneously every day  -lorazepam 1mg for anxiety with TPN and meds once per day (uses most nights)  -Zofran 8mg Q 8 hours PRN     Previous medication treatments included:  OPIATES: Fentanyl (helpful), morphine (hallucinations), Dilaudid (not helpful, did not dissolve), Tylenol #3 (not helpful), hydrocodone (not helpful), Belbuca (not helpful--he had a really heavy chest feeling)  NSAIDS: cannot take due to severe gastric ulcer disease  MUSCLE RELAXANTS: none  ANTI-MIGRAINE MEDS: none  ANTI-DEPRESSANTS: none  SLEEP AIDS: benadryl (helpful)  ANTI-CONVULSANTS: gabapentin (bad headaches and acid reflux),  TOPICALS: Lidocaine patches (helpful)  ANXIOLYTICS: valium (helpful 5mg dosage), lorazepam (helpful)  MEDICAL CANNABIS: not interested  Other meds: tylenol (helpful for headaches)        Other treatments have included:  Parker Acevedo has been seen at a pain clinic in the past.  Previously managed here by Dr. Jessica Milligan. Also seen by [facility] for possible implanted intrathecal pain pump, he is not candidate due to infection risk.   PT: tried, never helped his neck or abdominal surgery  Massage Therapy: helpful for a day or two  Chiropractic care: tried, helped some   Acupuncture: tried, not helpful  TENs Unit: tried, not helpful  Healing Touch: not helpful     Injections:   -previously had injections for his neck with Dr. Jessica Milligan      Surgeries:  9/30/2023 exploratory laparotomy, reduction of intussusception, resection of small bowel with primary anastamosis, and upper endoscopy with Dr. Mercado (helpful)      THE 4 A's OF OPIOID MAINTENANCE ANALGESIA    Analgesia:  keeps pain pretty manageable    Activity: he walks daily, light housekeeping    Adverse effects: none    Adherence to Rx protocol: yes      Side Effects: no side effect  Patient is using the medication as prescribed: YES    Medications:  Current Outpatient Medications   Medication Sig Dispense Refill    albuterol (VENTOLIN HFA) 108 (90 Base) MCG/ACT inhaler Inhale 2 puffs into the lungs every 6 hours as needed 18 g 1    ALPRAZolam (XANAX) 0.5 MG tablet TAKE ONE TABLET BY MOUTH TWICE A DAY AS NEEDED FOR SLEEP OR ANXIETY 60 tablet 0    amphetamine-dextroamphetamine (ADDERALL) 20 MG tablet TAKE ONE TABLET BY MOUTH ONCE DAILY 30 tablet 0    carvedilol (COREG) 6.25 MG tablet TAKE 2 TABLETS (12.5 MG) BY MOUTH 2 TIMES DAILY WITH MEALS 360 tablet 0    cyanocobalamin (CYANOCOBALAMIN) 1000 MCG/ML injection Inject 1 mL (1,000 mcg) into the muscle every 30 days 1 mL 3    enoxaparin ANTICOAGULANT (LOVENOX) 80 MG/0.8ML syringe Inject 0.8 mLs (80 mg) Subcutaneous 2 times daily INJECT THE CONTENTS OF ONE SYRINGE (80MG) UNDER THE SKIN TWO TIMES A DAY 67.2 mL 0    fentaNYL (DURAGESIC) 25 mcg/hr 72 hr patch Place 1 patch onto the skin every 48 hours. 30 day supply for chronic pain. OK to fill 10/17/24 and start 10/24/24. Please allow early fill traveling out of town and won't be back for a week 15 patch 0    lipids plant base (CLINOLIPID) 20 % infusion Inject 250 mLs into the vein every 24 hours 1000 mL 3    naloxone (NARCAN) 4 MG/0.1ML nasal spray Spray 1 spray (4 mg) into one nostril alternating nostrils as needed for opioid reversal. every 2-3 minutes until assistance arrives 0.2 mL 3    nystatin (MYCOSTATIN) 052922 UNIT/GM external cream Apply topically daily as needed for other (around area of J tube)      ondansetron (ZOFRAN ODT) 8 MG ODT tab DISSOLVE ONE TABLET BY MOUTH EVERY 8 HOURS AS NEEDED FOR NAUSEA 90 tablet 1    oxyCODONE (ROXICODONE) 5 MG/5ML solution Take 15 mLs (15 mg) by mouth at bedtime. May also take 10 mLs (10 mg)  "every 4 hours as needed for moderate to severe pain. Max of 45mg/day. 30 day supply for chronic pain. OK to fill 10/17/24 and start 10/24/24. Please allow early fill traveling out of town and won't be back for a week. 1350 mL 0    parenteral nutrition - PTA/DISCHARGE ORDER Infuse daily. Managed by [employer] [phone]  Total volume: 1260 mL  Cycled over 12 hr  Clinisol 15% 100 gram/day  Dextrose 70% 175 gram/day  Na Acetate 35 mEq/day  K Chloride 85 mEq/day  Mg sulfate 6 mEq/day  Ca gluconate 25 mEq/day  MVI 10 mL/day  Trace 1 mL/day  Zinc 5 mg /day  Famotidine 20 mg/day  Cl:Ace=1: 2.4  Intralipid 20% 50 gram/day ([facility] only)      polyethylene glycol (MIRALAX) 17 GM/Dose powder Take 17 g by mouth as needed for constipation      sucralfate (CARAFATE) 1 GM/10ML suspension Take 10 mLs (1 g) by mouth 4 times daily as needed for nausea. 414 mL 1    Syringe/Needle, Disp, (B-D ECLIPSE SYRINGE) 27G X 1/2\" 1 ML MISC 1 Device every 30 days. 30 each 1    vitamin D2 (ERGOCALCIFEROL) 53322 units (1250 mcg) capsule Take 1 capsule (50,000 Units) by mouth once a week (Patient taking differently: Take 50,000 Units by mouth once a week. On Mondays) 12 capsule 3       Medical History: any changes in medical history since they were last seen? No    Social History:   Home situation:  to Vandana, one son in late 20's   Occupation/Schooling: he used to work at [employer]. He is on disability, SSDI  Tobacco use: smokes 1/2 ppd, discussed smoking cessation today, he is not ready at present time  Alcohol use: none  Drug use: none  History of chemical dependency treatment: none    Is patient a current smoker or tobacco user?  Down to 3-4 cigarettes per day  If yes, was cessation counseling offered?  Yes          Physical Exam:  Vital signs: Blood pressure 137/81, pulse 88.    Behavioral observations:  Awake, alert, conversant and cooperative     Gait:  normal    Musculoskeletal exam:  strength grossly equal throughout "     Neuro exam:  deferred    Skin/vascular/autonomic:  No suspicious lesions on exposed skin. Lifts shirt to show me the large recurrent umbilical hernia     Other:  na          Diagnostic tests:  CT ABDOMEN PELVIS W CONTRAST  2/16/2019 3:00 AM      HISTORY: Right-sided abdominal pain, status post gastric bypass.  Patient has G-tube with blood and history of ulcers.     TECHNIQUE: CT abdomen and pelvis with 85 mL Isovue-370 IV. Radiation  dose for this scan was reduced using automated exposure control,  adjustment of the mA and/or kV according to patient size, or iterative  reconstruction technique.     COMPARISON: 1/13/2015.     FINDINGS:  Abdomen: There is mild dependent atelectasis at the lung bases. The  heart size is normal. Small hiatal hernia. There is mild biliary  dilatation into the pancreas head which is probably normal post  cholecystectomy. The liver otherwise appears normal. The gallbladder  is absent. The spleen, pancreas, adrenal glands and kidneys are normal  in appearance. There is a G-tube in place. No abdominal or pelvic  lymph node enlargement.     Pelvis: The ascending colon and transverse colon are very distended  with gas, stool and fluid. The descending and rectosigmoid colon are  nondilated. Transition point is in the region of the splenic flexure,  but there is no convincing obstruction. Small bowel is normal in  caliber. The appendix is normal. There is no free intraperitoneal gas  or fluid.                                                                      IMPRESSION:  1. The ascending and transverse colon are distended with gas, stool  and fluid. Distal colon is nondilated. No focal obstruction is  evident.  2. Small hiatal hernia.     IMANI HU MD        MR CERVICAL SPINE WITHOUT CONTRAST  1/2/2017  2:44 PM     HISTORY: Injury to neck 2 weeks ago with development of suspected  radicular pain to the left upper extremity with weakness of thumb and  index finger.     COMPARISON:  Plain films 12/22/2016.     TECHNIQUE: Routine MR cervical spine extended through T2.     FINDINGS: Vertebral body bone marrow signal is normal and the  alignment is normal through T2. No malignant or destructive changes.  The cervical and upper thoracic spinal cord appear intrinsically  normal through T2. Craniocervical junction region is normal. No  paraspinous soft tissue abnormality.     Findings by level as follows:     C2-C3: Negative. No disc protrusion. No central or lateral stenosis.     C3-C4: Negative.     C4-C5: Very tiny central disc protrusion. No significant central or  lateral stenosis.     C5-C6: Moderate degenerative narrowing of the interspace. Mild  broad-based disc osteophyte complex and small uncinate spurs. Minimal  central stenosis and minimal bilateral foraminal stenosis.     C6-C7: Moderate degenerative narrowing of the interspace. No disc  protrusion. No central or lateral stenosis.     C7-T1: Negative.                                                                      IMPRESSION:  1. Degenerative changes as described, most marked at C5-C6 and C6-C7.  2. No intrinsic cord abnormality through T2.     MARTHA MCCARTY MD           MR LUMBAR SPINE W/O & W CONTRAST 1/6/2019 3:49 PM     Provided History: Back pain, > 6wks conservative tx, persistent sx;  pain in the left paraspinal region- now with fungemia, persistent  blood stream infections since Oct 2018.     Comparison: No similar studies     Technique: Sagittal T1-weighted and T2-weighted and axial T2-weighted  images of the lumbar spine were obtained without intravenous contrast.  Post intravenous contrast using gadolinium axial and sagittal  T1-weighted images were obtained with fat saturation.     Contrast: 8.9CC GADAVIST     Findings: Regarding numbering convention, there are 5 lumbar-type  vertebrae assumed for the purposes of this dictation.  The tip of the  conus medullaris is at L1.  Regarding alignment, the lumbar  vertebral  column appears normally aligned.  There is no significant disc height  narrowing at any level.  Regarding bone marrow signal intensity, no  abnormality is visualized on STIR images.     On a level by level basis:     L1-2: No significant spinal canal or foraminal narrowing.     L2-3: Minimal disc bulge. Mild thickening of the ligamentum flavum. No  significant spinal canal or foraminal narrowing.     L3-4: Mild disc bulge and thickening of the ligamentum flavum with  mild spinal canal narrowing. No neural foraminal narrowing.     L4-5: Mild disc bulge and thickening of the ligamentum flavum. No  central spinal canal narrowing or neuroforaminal stenosis.      L5-S1: No significant spinal canal or foraminal narrowing.     3 bandlike areas of high STIR signal and enhancement in the right  posterior paraspinous muscles.                                                                      Impression:  1. No abnormal signal within the spine to suggest fungal infection.  Mild nonspecific enhancement and STIR hyperintensity in the right  posterior paraspinous muscles, potentially myositis.  2. Multilevel lumbar spondylosis.     I have personally reviewed the examination and initial interpretation  and I agree with the findings.     YOLA TELLEZ MD           Other testing (labs, diagnostics):  6/18/2024  Cr. 0.78  Est GFR >90        Screening tools:      DIRE Score for ongoing opioid management is calculated as follows:     Diagnosis = 2     Intractability = 2     Risk: Psych = 3  Chem Hlth = 2  Reliability = 2  Social = 2     Efficacy = 2     Total DIRE Score = 15 (14 or higher predicts good candidate for ongoing opioid management; 13 or lower predicts poor candidate for opioid management)         [place] Pres[place] Monitoring [place]:  [place]ed [place]  10/14/2024- no concerning fills.  Natalie MENARD, RN CNP, FNP  [facility]  [place] location          Assessment:   Visceral  hyperalgesia  Chronic abdominal pain  Jejunal intusseption  Chronic bilateral low back pain without sciatica  Chronic pain syndrome  Chronic continuous use of opioids  Encounter for long term use of opiate analgesic (current use)  CSA signed 10/14/2024  UDT 10/14/2024 wearing Fentanyl and oxycodone at 2pm  Long term current use of opiate analgesic  PMHx includes: ADHD, anxiety, cardiomyopathy and nutritional diseases, chronic abdominal pain, central line associated bloodstream infection recurrent, anesthesia complication, difficulty swallowing, gastric ulcer unspecified as acute or chronic without mention of hemorrhage perforation or obstruction, gastroesophageal reflux disease, head injury, hiatal hernia, other bladder disorder, other chronic pain, postop nausea and vomiting, severe malnutrition on TPN, short gut syndrome, tobacco abuse  PSHx includes: Appendectomy, back surgery (11/3/2014), biopsy of cervical lymph node (2/20/2015), cholecystectomy, colonoscopy (2021, 2022), cervical anterior 1 level discectomy and fusion (2/15/2017), endoscopic insertion tube gastrostomy (9/9/2013), endoscopic ultrasound upper gastrointestinal tract (4/29/2011), endoscopic ultrasound upper gastrointestinal tract (9/9/2013), endoscopic ultrasound upper gastrointestinal tract (2/24/2021), endoscopy (3/25/2011, 8/4/2009, 1/5/2009), multiple EGDs, gastrectomy (6/22/2012), gastrojejunostomy (8/26/2009), umbilical hernia repair (2006), hernia raphe hiatal (6/22/2012), herniorrhaphy inguinal (11/22/2013), insert PICC line on the right (12/19/2019), insert PICC line right (2/21/2020), insert vascular access port on the right (12/19/2017, 8/2/2018), multiple interventional radiology CVC tunnel placement and removals, exploratory laparotomy (11/22/2013), orthopedic surgery, Celestino-en-Y gastric bypass (2003), tonsillectomy.        Plan:   Physical Therapy: none  Clinical Health Psychologist to address issues of relaxation, behavioral change,  coping style, and other factors important to improvement: none  Continue gentle movement at home  Diagnostic Studies: none  Medication Management:   Continue fentanyl patch 25mcg/hr every 48 hours, fill 10/17  and start 10/24  Oxycodone liquid 5mg/5ml, take 15mg at bedtime (15m.) and may take 10mg (10ml) every 4 hours max of 3 doses per day of 10mg. Max of 45mg (45ml) per day.  Fill 10/17 and start 10/24.   Allow early fill due to travel out of town and won't be home for a week.   Reminded not to drink alcohol while on opiate meds   Further procedures recommended: none  Recommendations/follow-up for PCP:  See above  Release of information: none  Signed CSA today  UDT last took oxycodone this afternoon at 2 pm, wearing Fentanyl. He admitted to drinking a beer yesterday  Follow up: 2 months can be in-person or virtual     ASSESSMENT AND PLAN:  (R19.8) Visceral hyperalgesia  (primary encounter diagnosis)  Comment:   Plan: fentaNYL (DURAGESIC) 25 mcg/hr 72 hr patch,         oxyCODONE (ROXICODONE) 5 MG/5ML solution, Adult        Pain Clinic Follow-Up Order            (R10.9,  G89.29) Chronic abdominal pain  Comment:   Plan: fentaNYL (DURAGESIC) 25 mcg/hr 72 hr patch,         oxyCODONE (ROXICODONE) 5 MG/5ML solution, Adult        Pain Clinic Follow-Up Order            (K56.1) Jejunal intussusception (H)  Comment:   Plan: naloxone (NARCAN) 4 MG/0.1ML nasal spray, Adult        Pain Clinic Follow-Up Order            (M54.50,  G89.29) Chronic bilateral low back pain without sciatica  Comment:   Plan: fentaNYL (DURAGESIC) 25 mcg/hr 72 hr patch,         oxyCODONE (ROXICODONE) 5 MG/5ML solution, Adult        Pain Clinic Follow-Up Order            (G89.4) Chronic pain syndrome  Comment:   Plan: fentaNYL (DURAGESIC) 25 mcg/hr 72 hr patch,         oxyCODONE (ROXICODONE) 5 MG/5ML solution, Adult        Pain Clinic Follow-Up Order            (F11.90) Chronic, continuous use of opioids  Comment:   Plan: Ethanol urine, Ethyl  Glucuronide Screen with         Reflex to Confirmation, Urine, Drug         Confirmation Panel Urine with Creatinine,         fentaNYL (DURAGESIC) 25 mcg/hr 72 hr patch,         oxyCODONE (ROXICODONE) 5 MG/5ML solution, Adult        Pain Clinic Follow-Up Order            (Z79.891) Encounter for long-term use of opiate analgesic  Comment:   Plan: Ethanol urine, Ethyl Glucuronide Screen with         Reflex to Confirmation, Urine, Drug         Confirmation Panel Urine with Creatinine, Adult        Pain Clinic Follow-Up Order              BILLING TIME DOCUMENTATION:   TOTAL TIME includes:   Time spent preparing to see the patient: 0 minutes (reviewing records and tests)  Time spend face to face with the patient: 24 minutes  Time spent ordering tests, medications, procedures and referrals: 0 minutes  Time spent Referring and communicating with other healthcare professionals: 0 minutes  Documenting clinical information in Epic: 8 minutes    The total TIME spent on this patient on the day of the appointment was 32 minutes.            Natalie MENARD, RN CNP, FNP  [facility]  [facility] [place]

## 2024-10-15 LAB
CREAT UR-MCNC: 9 MG/DL
ETHANOL UR QL SCN: ABNORMAL
ETHYL GLUCURONIDE UR QL SCN: NORMAL NG/ML

## 2024-10-17 LAB
ETHYL GLUCURONIDE UR CFM-MCNC: NORMAL NG/ML
ETHYL SULFATE UR CFM-MCNC: 6823 NG/ML
FENTANYL UR CFM-MCNC: 7 NG/ML
FENTANYL/CREAT UR: 78 NG/MG {CREAT}
NORFENTANYL UR CFM-MCNC: 6 NG/ML
NORFENTANYL/CREAT UR: 67 NG/MG {CREAT}

## 2024-10-17 NOTE — PLAN OF CARE
Contacted pt w/ the assist of [employer], answered all questions she had with his assist.   Goal Outcome Evaluation:      Alert and oriented X4.    Independent in the room. Ambulates .    Pain managed with     PIV got bad . Writer called for  a new PIV placement.     Patient requested for all medications to be done at 10 pm.     Patient refused TPN overnight. Provider informed. Provider states blood sugar should be checked every 4 hours.     Continue plan of care

## 2024-10-23 ENCOUNTER — PATIENT OUTREACH (OUTPATIENT)
Dept: CARE COORDINATION | Facility: CLINIC | Age: 54
End: 2024-10-23
Payer: COMMERCIAL

## 2024-10-25 ENCOUNTER — MYC MEDICAL ADVICE (OUTPATIENT)
Dept: PALLIATIVE MEDICINE | Facility: CLINIC | Age: 54
End: 2024-10-25
Payer: COMMERCIAL

## 2024-10-25 ENCOUNTER — TELEPHONE (OUTPATIENT)
Dept: INTERNAL MEDICINE | Facility: CLINIC | Age: 54
End: 2024-10-25
Payer: COMMERCIAL

## 2024-10-25 NOTE — TELEPHONE ENCOUNTER
Home Care is calling regarding an established patient with [facility].       Requesting orders from: Chuy Isaac  Provider is following patient: Yes  Is this a 60-day recertification request?  No    Orders Requested  Asking to discharge patient from home care, he is not home bound per insurance company protocol.      Information was gathered and will be sent to provider for review.  RN will contact Home Care with information after provider review.  Confirmed ok to leave a detailed message with call back.  Contact information confirmed and updated as needed.    Ella Hammond RN

## 2024-10-25 NOTE — TELEPHONE ENCOUNTER
Writer spoke with CHRISTY Pedroza with [employer], October 25, 2024 at 3:20 PM gave verbal per Dr. Isaac's approval for Home Care orders as requested.    Kasia states patient will continue with the infusion company for TPN and dressing changes. Patient will go into clinic to have labs drawn.     Celi Rosa RN on 10/25/2024 at 3:20 PM

## 2024-10-27 ENCOUNTER — MYC REFILL (OUTPATIENT)
Dept: INTERNAL MEDICINE | Facility: CLINIC | Age: 54
End: 2024-10-27
Payer: COMMERCIAL

## 2024-10-27 DIAGNOSIS — R11.0 NAUSEA: ICD-10-CM

## 2024-10-27 DIAGNOSIS — F41.9 CHRONIC ANXIETY: ICD-10-CM

## 2024-10-27 DIAGNOSIS — F90.0 ADHD (ATTENTION DEFICIT HYPERACTIVITY DISORDER), INATTENTIVE TYPE: ICD-10-CM

## 2024-10-28 RX ORDER — ONDANSETRON 8 MG/1
TABLET, ORALLY DISINTEGRATING ORAL
Qty: 90 TABLET | Refills: 1 | Status: SHIPPED | OUTPATIENT
Start: 2024-10-28

## 2024-10-28 RX ORDER — DEXTROAMPHETAMINE SACCHARATE, AMPHETAMINE ASPARTATE, DEXTROAMPHETAMINE SULFATE AND AMPHETAMINE SULFATE 5; 5; 5; 5 MG/1; MG/1; MG/1; MG/1
TABLET ORAL
Qty: 30 TABLET | Refills: 0 | Status: SHIPPED | OUTPATIENT
Start: 2024-10-28

## 2024-10-28 RX ORDER — ALPRAZOLAM 0.5 MG
TABLET ORAL
Qty: 60 TABLET | Refills: 0 | Status: SHIPPED | OUTPATIENT
Start: 2024-10-28

## 2024-10-28 NOTE — TELEPHONE ENCOUNTER
CHRISTY Patricia  [facility]  [phone]      Home Care is calling regarding an established patient with [facility].       Requesting orders from: Chuy Isaac  Provider is following patient: Yes  Is this a 60-day recertification request?  Yes    Orders Requested    Skilled Nursing  Request for recertification   Frequency:  1x/wk for 9 wks for central line changes     Information was gathered and will be sent to provider for review.  RN will contact Home Care with information after provider review.  Confirmed ok to leave a detailed message with call back.  Contact information confirmed and updated as needed.    ERIN Rabago, RN  [facility] ~ Registered Nurse  Clinic Triage  May 14, 2024          
I approve of requested home care orders.    Chuy Isaac MD    
Relayed providers message of approval of home care orders as requested.     Iyv Howard RN on 5/14/2024 at 1:17 PM    
98.5

## 2024-10-29 NOTE — TELEPHONE ENCOUNTER
See [employer] message sent to patient.     Natalie MENARD RN CNP, FNP  [facility]

## 2024-10-31 ENCOUNTER — TELEPHONE (OUTPATIENT)
Dept: INTERNAL MEDICINE | Facility: CLINIC | Age: 54
End: 2024-10-31
Payer: COMMERCIAL

## 2024-10-31 NOTE — TELEPHONE ENCOUNTER
Reason for Call:  Form, our goal is to have forms completed with 72 hours, however, some forms may require a visit or additional information.    Type of letter, form or note:  Home Health Certification    Who is the form from?: Home care    Where did the form come from: form was faxed in    What clinic location was the form placed at?: [facility]    Where the form was placed: Given to physician    What number is listed as a contact on the form?: [phone]       Additional comments: [employer]    Call taken on 10/31/2024 at 12:52 PM by Kristin Jaimes

## 2024-11-01 DIAGNOSIS — Z53.9 DIAGNOSIS NOT YET DEFINED: Primary | ICD-10-CM

## 2024-11-01 PROCEDURE — G0179 MD RECERTIFICATION HHA PT: HCPCS | Performed by: INTERNAL MEDICINE

## 2024-11-04 ENCOUNTER — TELEPHONE (OUTPATIENT)
Dept: INTERNAL MEDICINE | Facility: CLINIC | Age: 54
End: 2024-11-04
Payer: COMMERCIAL
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
## 2024-11-05 NOTE — TELEPHONE ENCOUNTER
Left message on confidential voicemail with Dr. Isaac approval for Home Care orders as requested.    Celi Rosa RN on 11/5/2024 at 10:38 AM

## 2024-11-06 ENCOUNTER — MYC REFILL (OUTPATIENT)
Dept: PALLIATIVE MEDICINE | Facility: CLINIC | Age: 54
End: 2024-11-06
Payer: COMMERCIAL

## 2024-11-06 ENCOUNTER — MYC REFILL (OUTPATIENT)
Dept: INTERNAL MEDICINE | Facility: CLINIC | Age: 54
End: 2024-11-06
Payer: COMMERCIAL

## 2024-11-06 DIAGNOSIS — R19.8 VISCERAL HYPERALGESIA: ICD-10-CM

## 2024-11-06 DIAGNOSIS — R10.9 CHRONIC ABDOMINAL PAIN: ICD-10-CM

## 2024-11-06 DIAGNOSIS — F11.90 CHRONIC, CONTINUOUS USE OF OPIOIDS: ICD-10-CM

## 2024-11-06 DIAGNOSIS — G89.29 CHRONIC BILATERAL LOW BACK PAIN WITHOUT SCIATICA: ICD-10-CM

## 2024-11-06 DIAGNOSIS — G89.4 CHRONIC PAIN SYNDROME: ICD-10-CM

## 2024-11-06 DIAGNOSIS — E53.8 VITAMIN B12 DEFICIENCY (NON ANEMIC): ICD-10-CM

## 2024-11-06 DIAGNOSIS — M54.50 CHRONIC BILATERAL LOW BACK PAIN WITHOUT SCIATICA: ICD-10-CM

## 2024-11-06 DIAGNOSIS — G89.29 CHRONIC ABDOMINAL PAIN: ICD-10-CM

## 2024-11-06 DIAGNOSIS — Z98.84 S/P BARIATRIC SURGERY: ICD-10-CM

## 2024-11-06 RX ORDER — FENTANYL 25 UG/1
1 PATCH TRANSDERMAL
Qty: 15 PATCH | Refills: 0 | Status: SHIPPED | OUTPATIENT
Start: 2024-11-06

## 2024-11-06 RX ORDER — CYANOCOBALAMIN 1000 UG/ML
1 INJECTION, SOLUTION INTRAMUSCULAR; SUBCUTANEOUS
Qty: 1 ML | Refills: 1 | Status: SHIPPED | OUTPATIENT
Start: 2024-11-06

## 2024-11-06 RX ORDER — OXYCODONE HCL 5 MG/5 ML
SOLUTION, ORAL ORAL
Qty: 1350 ML | Refills: 0 | Status: SHIPPED | OUTPATIENT
Start: 2024-11-06

## 2024-11-06 NOTE — TELEPHONE ENCOUNTER
Refills have been requested for the following medications:         fentaNYL (DURAGESIC) 25 mcg/hr 72 hr patch [Natalie Hull]         oxyCODONE (ROXICODONE) 5 MG/5ML solution [Natalie Hull]     Preferred pharmacy: [facility] [place], [place] - [address] [place] [address]

## 2024-11-06 NOTE — TELEPHONE ENCOUNTER
Received call from patient requesting refill(s) of     FENTANYL 25 MCG/HR TD PT72  Dispense: Oct., 17, 2024       OXYCODONE HCL 5 MG/5ML PO SOLN  Dispense: Oct. 17, 2024           Patient's last office/virtual visit by prescribing provider on: Oct. 14, 2024   Next office/virtual appointment scheduled for: Dec. 17, 2024       UDT: Oct. 14, 2024   CSA: Oct. 16, 2024       E-pres[place] to    [facility] [place] - [address]      Will route to nursing [place] for review and preparation of prescription(s).

## 2024-11-06 NOTE — TELEPHONE ENCOUNTER
Signed Prescriptions:                        Disp   Refills    fentaNYL (DURAGESIC) 25 mcg/hr 72 hr patch 15 pat*0        Sig: Place 1 patch onto the skin every 48 hours. 30 day           supply for chronic pain. OK to fill 11/21/24 and           start 11/23/24.  Authorizing Provider: NATALIE MCDOWELL    oxyCODONE (ROXICODONE) 5 MG/5ML solution   1350 mL0        Sig: Take 15 mLs (15 mg) by mouth at bedtime. May also           take 10 mLs (10 mg) every 4 hours as needed for           moderate to severe pain. Max of 45mg/day. 30 day           supply for chronic pain. OK to fill 11/21/24 and           start 11/23/24..  Authorizing Provider: NATALIE MCDOWELL        Reviewed [place]  November 6, 2024- no concerning fills.    Natalie MENARD, RN CNP, FNP  [facility]  [facility]

## 2024-11-06 NOTE — TELEPHONE ENCOUNTER
Medication refill information reviewed.     Due date for fentaNYL (DURAGESIC) 25 mcg/hr 72 hr patch and oxyCODONE (ROXICODONE) 5 MG/5ML solution is 11/23/24     Prescriptions prepped for review.     Will route to provider.

## 2024-11-07 RX ORDER — SUCRALFATE ORAL 1 G/10ML
1 SUSPENSION ORAL 4 TIMES DAILY PRN
Qty: 414 ML | Refills: 1 | Status: SHIPPED | OUTPATIENT
Start: 2024-11-07

## 2024-11-14 ENCOUNTER — TELEPHONE (OUTPATIENT)
Dept: SURGERY | Facility: CLINIC | Age: 54
End: 2024-11-14
Payer: COMMERCIAL

## 2024-11-14 NOTE — TELEPHONE ENCOUNTER
[facility] is calling ergarding lab results that would have been sent to the clinic within the last couple weeks. The clinic stated they sent sent them to the pharmacy, however, the pharmacy has not received them. Please call the pharmacy at [phone] or send the labs by fax to [phone].

## 2024-11-21 ENCOUNTER — LAB (OUTPATIENT)
Dept: LAB | Facility: CLINIC | Age: 54
End: 2024-11-21
Attending: SURGERY
Payer: COMMERCIAL

## 2024-11-21 DIAGNOSIS — F90.0 ADHD (ATTENTION DEFICIT HYPERACTIVITY DISORDER), INATTENTIVE TYPE: ICD-10-CM

## 2024-11-21 DIAGNOSIS — F41.9 CHRONIC ANXIETY: ICD-10-CM

## 2024-11-21 LAB
ALBUMIN SERPL BCG-MCNC: 4.1 G/DL (ref 3.5–5.2)
ALP SERPL-CCNC: 111 U/L (ref 40–150)
ALT SERPL W P-5'-P-CCNC: 25 U/L (ref 0–70)
ANION GAP SERPL CALCULATED.3IONS-SCNC: 17 MMOL/L (ref 7–15)
AST SERPL W P-5'-P-CCNC: 31 U/L (ref 0–45)
BASOPHILS # BLD AUTO: 0.1 10E3/UL (ref 0–0.2)
BASOPHILS NFR BLD AUTO: 1 %
BILIRUB DIRECT SERPL-MCNC: 0.34 MG/DL (ref 0–0.3)
BILIRUB SERPL-MCNC: 1.1 MG/DL
BUN SERPL-MCNC: 14.4 MG/DL (ref 6–20)
CALCIUM SERPL-MCNC: 8.3 MG/DL (ref 8.8–10.4)
CHLORIDE SERPL-SCNC: 104 MMOL/L (ref 98–107)
CREAT SERPL-MCNC: 0.58 MG/DL (ref 0.67–1.17)
CRP SERPL-MCNC: <3 MG/L
EGFRCR SERPLBLD CKD-EPI 2021: >90 ML/MIN/1.73M2
EOSINOPHIL # BLD AUTO: 0.1 10E3/UL (ref 0–0.7)
EOSINOPHIL NFR BLD AUTO: 1 %
ERYTHROCYTE [DISTWIDTH] IN BLOOD BY AUTOMATED COUNT: 20.8 % (ref 10–15)
FASTING STATUS PATIENT QL REPORTED: YES
FASTING STATUS PATIENT QL REPORTED: YES
FERRITIN SERPL-MCNC: 67 NG/ML (ref 31–409)
GLUCOSE SERPL-MCNC: 72 MG/DL (ref 70–99)
HCO3 SERPL-SCNC: 22 MMOL/L (ref 22–29)
HCT VFR BLD AUTO: 38.2 % (ref 40–53)
HGB BLD-MCNC: 12.6 G/DL (ref 13.3–17.7)
IMM GRANULOCYTES # BLD: 0 10E3/UL
IMM GRANULOCYTES NFR BLD: 0 %
LYMPHOCYTES # BLD AUTO: 1.5 10E3/UL (ref 0.8–5.3)
LYMPHOCYTES NFR BLD AUTO: 16 %
MAGNESIUM SERPL-MCNC: 2 MG/DL (ref 1.7–2.3)
MCH RBC QN AUTO: 29.2 PG (ref 26.5–33)
MCHC RBC AUTO-ENTMCNC: 33 G/DL (ref 31.5–36.5)
MCV RBC AUTO: 89 FL (ref 78–100)
MONOCYTES # BLD AUTO: 0.7 10E3/UL (ref 0–1.3)
MONOCYTES NFR BLD AUTO: 7 %
NEUTROPHILS # BLD AUTO: 7.1 10E3/UL (ref 1.6–8.3)
NEUTROPHILS NFR BLD AUTO: 75 %
NRBC # BLD AUTO: 0 10E3/UL
NRBC BLD AUTO-RTO: 0 /100
PHOSPHATE SERPL-MCNC: 3.7 MG/DL (ref 2.5–4.5)
PLATELET # BLD AUTO: 190 10E3/UL (ref 150–450)
POTASSIUM SERPL-SCNC: 4 MMOL/L (ref 3.4–5.3)
PROT SERPL-MCNC: 7.1 G/DL (ref 6.4–8.3)
RBC # BLD AUTO: 4.31 10E6/UL (ref 4.4–5.9)
SODIUM SERPL-SCNC: 143 MMOL/L (ref 135–145)
TRIGL SERPL-MCNC: 96 MG/DL
WBC # BLD AUTO: 9.4 10E3/UL (ref 4–11)

## 2024-11-21 PROCEDURE — 250N000011 HC RX IP 250 OP 636: Performed by: SURGERY

## 2024-11-21 RX ORDER — HEPARIN SODIUM,PORCINE 10 UNIT/ML
5 VIAL (ML) INTRAVENOUS ONCE
Status: COMPLETED | OUTPATIENT
Start: 2024-11-21 | End: 2024-11-21

## 2024-11-21 RX ADMIN — Medication 5 ML: at 14:04

## 2024-11-21 NOTE — NURSING NOTE
Chief Complaint   Patient presents with    Labs Only     Labs drawn via CVC by lab RN       Blood draw via SL left CVC and flushed with heparin by lab RN.     Soraida Gilliam RN

## 2024-11-23 LAB
COPPER SERPL-MCNC: 103.8 UG/DL
SELENIUM SERPL-MCNC: 146.7 UG/L
ZINC SERPL-MCNC: 115.2 UG/DL

## 2024-11-25 RX ORDER — ALPRAZOLAM 0.5 MG
TABLET ORAL
Qty: 60 TABLET | Refills: 0 | Status: SHIPPED | OUTPATIENT
Start: 2024-11-25

## 2024-11-25 RX ORDER — DEXTROAMPHETAMINE SACCHARATE, AMPHETAMINE ASPARTATE, DEXTROAMPHETAMINE SULFATE AND AMPHETAMINE SULFATE 5; 5; 5; 5 MG/1; MG/1; MG/1; MG/1
TABLET ORAL
Qty: 30 TABLET | Refills: 0 | Status: SHIPPED | OUTPATIENT
Start: 2024-11-25

## 2024-11-26 NOTE — PLAN OF CARE
VSS. Left abdominal pain radiating to back; oxy Q4 hours. Old Cm site dressing removed and band-aid now in place. Wound care performed on left abdomen; scant drainage. Requires benadryl prior to vanco. Up independently. Continue with plan of care.   
"[id number]    /87 (BP Location: Right arm)   Pulse 60   Temp 96.7  F (35.9  C) (Oral)   Resp 16   Ht 1.816 m (5' 11.5\")   Wt 85.5 kg (188 lb 9.6 oz)   SpO2 100%   BMI 25.94 kg/m      Reason for admission: 1.5 days of fevers at home, found to have blood cultures positive for E. Faecalis. He is admitted for IV antibiotics.  Activity: UAL.  Pain: Reporting abdominal pain radiating to back. Given PRN Oxycodone x1, Fentanyl patch with effect.  Neuro: WDL. AxOx4. Neuros intact.   Cardiac: WDL. Normotensive. Afebrile.   Respiratory: Non labored breathing on RA. O2 sats WDL.   GI/: WDL. Voiding not saving. PMH Celestino en Y, short gut syndrome, sporadic BMs at baseline. Abdominal fistula with primapore, changed at HS. Diet: Regular diet with poor appetite.    Lines: R PIV infiltrated with Vancomycin. Provider notified, PIV removed, cold compress applied, Extravasation kit obtained and documentation completed. Mild edema, erythema at onset, promptly resolved. CTM. L PIV intact, SL, site WDL.   Wounds: Abdominal fistula site with primapore. Dressing changed at HS. CDI.    Labs/imaging: Reviewed. See chart.       Continue to monitor and follow POC    "
"[id number]    /81 (BP Location: Right arm)   Pulse 84   Temp 96.7  F (35.9  C) (Oral)   Resp 16   Ht 1.816 m (5' 11.5\")   Wt 87.8 kg (193 lb 9.6 oz)   SpO2 97%   BMI 26.63 kg/m      Reason for admission: 1.5 days of fevers at home, found to have blood cultures positive for E. Faecalis. He is admitted for IV antibiotics.  Activity: UAL. 1:1 attendant. Walking frequently on unit throughout shift.   Pain: Reporting abdominal pain radiating to back. Given PRN Oxycodone x2 with effect.   Neuro: AxOx4. Odd hyperactive affect. Neuros intact. Given PRN Lorazepam, PRN Seroquel at HS for anxiety/agitation.   Cardiac: HTN within parameters at start of shift, normotensive later. Afebrile.   Respiratory: Non labored breathing on RA. O2 sats WDL.   GI/: WDL. Voiding not saving. PMH Celestino en Y, short gut syndrome, sporadic BMs at baseline. Abdominal fistula with primapore. Pt refusing dressing early in shift, noting needing to \"air it out\"   Diet: Regular diet with good appetite.    Lines: DL Cm intact. HL x2.   Wounds: Abdominal fistula site with primapore. Pt with self cares for abdominal dressing. Refusing wound care from nursing.   Labs/imaging: Reviewed. See chart.       New changes this shift: Pt with continued odd hyperactive behaviors. Agitated at start of shift to have 1:1 attendant. Educated on need for safety concerns. AxO but hyperactive with intermittent confused behaviors, ie thinking somebody is knocking at door, listing \"1/2 gas cheese\" on list of requested meds. Psych consult ordered for routine completion.       Continue to monitor and follow POC  "
"[id number]    BP (!) 130/90 (BP Location: Right arm)   Pulse 73   Temp 97.2  F (36.2  C) (Oral)   Resp 18   Ht 1.816 m (5' 11.5\")   Wt 87.8 kg (193 lb 9.6 oz)   SpO2 96%   BMI 26.63 kg/m      Reason for admission: 1.5 days of fevers at home, found to have blood cultures positive for E. Faecalis. He is admitted for IV antibiotics.  Activity: UAL  Pain: Reporting abdominal pain radiating to back. Given PRN Oxycodone x1 with effect.   Neuro: AxOx4. Odd hyperactive affect. Neuros intact. Given PRN Lorazepam x1 at HS for anxiety.   Cardiac: HTN within parameters. Afebrile.   Respiratory: Non labored breathing on RA. O2 sats WDL.   GI/: WDL. Voiding not saving. PMH Celestino en Y, short gut syndrome, sporadic BMs at baseline. Old PEG site with primapore CDI.   Diet: Regular diet.   Lines: DL Cm intact. HL x2. Cap changed x2.   Wounds: Old PEG site with primapore CDI.  Labs/imaging: Positive blood cultures reported including gram positive bacilli resembling dipthroids, gram positive cocci, and staphylcoccus species.       New changes this shift: Pt self disconnected vanco when infusion completed, powered off pump, educated on risk for infections and need to call for nursing to complete such tasks. Recommend locking pump for future infusions.       Continue to monitor and follow POC    "
"[id number]    /72 (BP Location: Left arm)   Pulse 56   Temp 98.1  F (36.7  C) (Oral)   Resp 16   Ht 1.816 m (5' 11.5\")   Wt 87.8 kg (193 lb 9.6 oz)   SpO2 99%   BMI 26.63 kg/m      Reason for admission: 1.5 days of fevers at home, found to have blood cultures positive for E. Faecalis. He is admitted for IV antibiotics.  Activity: UAL. 1:1 attendant. Walking frequently on unit throughout shift.   Pain: Reporting abdominal pain radiating to back. Given PRN Oxycodone x2, Fentanyl patch with effect.  Neuro: WDL. AxOx4. Neuros intact.   Cardiac: WDL. Normotensive. Afebrile.   Respiratory: Non labored breathing on RA. O2 sats WDL.   GI/: WDL. Voiding not saving. PMH Celestino en Y, short gut syndrome, sporadic BMs at baseline. Abdominal fistula with primapore, changed at HS. Diet: Regular diet with poor appetite.    Lines: DL Cm intact. HL x2.   Wounds: Abdominal fistula site with primapore. Dressing changed at HS. CDI.    Labs/imaging: Reviewed. See chart.     Plan: Cm to be removed in IR for \"line holiday\". See ID note for details.       Continue to monitor and follow POC    "
"[id number]:  Continues w/ abdominal pain, oxycodone given x3, some relief; fentanyl patch in place on L. Back. Nicotine patch on R. Deltoid. Pt complained of some abdominal discomfort, carafate & zofran given w/ some relief. Pt refused wound cares today, \"I'll change it when it's wet.\" Continues on IV antibiotics, premedicate w/ benadryl prior to vancomycin. Sitter was discontinued around 1230, no inappropriate behavior noted. Psych consulted. Continue to monitor & w/ POC.  "
00:00-07:00 am  AF with stable VS  Continue on IV Abx   Pt is alert but appears to be confused and disoriented to place and times  Intermittently found to be inappropriate with remarks/comments and behaviors--see previous notes. Pt appears compulsive and anxious, walking out of the room or disconnecting IV tubing, which now requiring attendant at bedside. Pain in abdominal at old PEG tube side manageable with prn Oxycodone 10 mg elixir.  No nausea/vomiting   Last voided at bedtime and no BM  Still need urine for drug screen  Pt is otherwise staying awake most of the night despite Ativan at bedtime and Benadryl as premed prior to Vanco   Continue to closely monitoring mental status and safety  Continue w/POC           
1100 - 1930: A&O x 4, AVSS ex DBP in 93, c/o pain 6 - 7/10 at mid abdomen which radiates to left side of back. Pain managed by oxycodone 10mg q4hrs w/ decrease in pain.   Blood culture collected 01/29 from red line/azevedo came back positive with gram + cocci in pairs & chains, provider notified, put back on iv vancomycin 1500 mg q12hrs. Gave benadryl prior to vancomycin d/t red man syndrome, vanco infusing via purple lumen for 3 hours.   Voiding spontaneously not saving, last bm was last week which is normal for pt d/t dumping syndrome.   Continue with poc...  
[id number]:  VSS, afebrile. Complaints of abdominal discomfort & pain; oxycodone given x2 & GI cocktail given x1; some relief provided. PIV placed. CVC removed per IR. Continues on antibiotics. Abdominal wound cleaned/dressing changed x3. Pt showered. Continue to monitor & w/ POC.  
[id number]  AVSS, alert and oriented x 4, denies nausea/sob. C/o abdominal discomfort and pain, Given oxycodone x 1, Ativan, Zofran,Seroquel at bedtime.  Continuous on antibiotic treatment, should be given benadryl before vancomycin due to reaction.  Continue to monitor care.  
AVSS. Oriented X 3. Restlessness noted while briefly awake. He ambulated in hallway with nursing assistant and returned to bed. IV vancomycin given. Pre-medicated with benadryl. Asleep most of the night.  
Alert and oriented  4. Anxious. Restless. Peeled off central line dressing x 2. Left lower abdomen dressing x 3.RN & MD made aware of  patient's behavior. Dressing replaced. Vital signs stable. On room air. Abdominal pain fully managed with oxycodone and Carafate. Poor oral intake. Plan: continue care. Premedicate with oral benadryl and give iv Vanco when the new dose available from pharmacy.  
Alert and oriented X 4. AVSS. Denies SOB or nausea. C/o epigastric pain radiating to left chest and back. Oxycodone, GI cocktail and Carafate given with decrease in pain. Plan for central line placement today.Sleeping in between cares.   
Alert and oriented X 4. AVSS. Denies SOB or nausea. C/o sharp abdominal pain which radiates to left chest and back. Oxycodone given with decrease in pain. Asleep most of the night.  
Alert and oriented X 4. Pleasant affect. No hyperactivity noted. AVSS. Denies SOB or nausea. C/o of sharp abdominal pain starting in the epigastrium and wrapping around left chest and back. Oxycodone, Carafate and GI cocktail given with decrease in pain. Voiding spontaneously. Asleep most of the night.    
Hypertensive to 161/99.  OVSS.  Pt endorsing mild pain at azevedo insertion site.  Controlled with Oxy x1.  A/ox4, cooperative with all cares.  Discharge plan reviewed with patient.  Verbalized understanding of plan.  Given ativan and seroquel x1 before going.  Pt left at approximately 1945.  
VSS, afebrile. Complaints of abdominal discomfort. Oxycodone given x2, Zofran given x1 & GI cocktail given x1, some relief provided. Notified provider of blood culture results; see provider notification note. Echo completed. ID consulting. Wound dressing changed. Ambulating halls independently. Continue to monitor & w/ POC.  
normal...

## 2024-12-03 ENCOUNTER — TELEPHONE (OUTPATIENT)
Dept: SURGERY | Facility: CLINIC | Age: 54
End: 2024-12-03
Payer: COMMERCIAL

## 2024-12-03 NOTE — TELEPHONE ENCOUNTER
Health Call Center    Phone Message    May a detailed message be left on voicemail: yes     Reason for Call: Medication Question or concern regarding medication   Prescription Clarification  Name of Medication: TPN  Prescribing Provider: Dr. Vyas   Pharmacy:    [facility] [address]    [address] [place] [id number]  (Phone: [phone] ext: [phone])  (Fax: [phone])     What on the order needs clarification? The TPN medication was sent with a formulation. They're wondering if the fax was received or not. Please call them back to let them know.      Action Taken: Message routed to:  [facility]    Travel Screening: Not Applicable     Date of Service:

## 2024-12-12 ENCOUNTER — MYC REFILL (OUTPATIENT)
Dept: INTERNAL MEDICINE | Facility: CLINIC | Age: 54
End: 2024-12-12
Payer: COMMERCIAL

## 2024-12-12 DIAGNOSIS — R06.02 SOB (SHORTNESS OF BREATH): ICD-10-CM

## 2024-12-12 DIAGNOSIS — F90.0 ADHD (ATTENTION DEFICIT HYPERACTIVITY DISORDER), INATTENTIVE TYPE: ICD-10-CM

## 2024-12-12 DIAGNOSIS — E53.8 VITAMIN B12 DEFICIENCY (NON ANEMIC): ICD-10-CM

## 2024-12-12 DIAGNOSIS — R11.0 NAUSEA: ICD-10-CM

## 2024-12-12 DIAGNOSIS — F41.9 CHRONIC ANXIETY: ICD-10-CM

## 2024-12-12 DIAGNOSIS — Z98.84 S/P BARIATRIC SURGERY: ICD-10-CM

## 2024-12-13 RX ORDER — ONDANSETRON 8 MG/1
TABLET, ORALLY DISINTEGRATING ORAL
Qty: 90 TABLET | Refills: 1 | Status: SHIPPED | OUTPATIENT
Start: 2024-12-13

## 2024-12-13 RX ORDER — CYANOCOBALAMIN 1000 UG/ML
1 INJECTION, SOLUTION INTRAMUSCULAR; SUBCUTANEOUS
Qty: 3 ML | Refills: 0 | Status: SHIPPED | OUTPATIENT
Start: 2024-12-13

## 2024-12-13 NOTE — TELEPHONE ENCOUNTER
Clinic RN: Please investigate patient's chart or contact patient if the information cannot be found because the medication is listed as historical or discontinued. Confirm patient is taking this medication. Document findings and route refill encounter to provider for approval or denial. Nystatin in the medication that is listed as historical in this encounter. All others need to go to the provider once Nystatin is confirmed (Dr. Isaac - Albuterol does pass protocol, however this PRN prescription is an  order). Thank you!     Garrick Rodríguez RN on 2024 at 9:09 AM

## 2024-12-16 ENCOUNTER — OFFICE VISIT (OUTPATIENT)
Dept: INTERNAL MEDICINE | Facility: CLINIC | Age: 54
End: 2024-12-16
Payer: COMMERCIAL

## 2024-12-16 VITALS
BODY MASS INDEX: 28.24 KG/M2 | DIASTOLIC BLOOD PRESSURE: 82 MMHG | RESPIRATION RATE: 16 BRPM | HEART RATE: 82 BPM | TEMPERATURE: 97.5 F | OXYGEN SATURATION: 98 % | SYSTOLIC BLOOD PRESSURE: 135 MMHG | WEIGHT: 190.7 LBS | HEIGHT: 69 IN

## 2024-12-16 DIAGNOSIS — E43 SEVERE MALNUTRITION (H): ICD-10-CM

## 2024-12-16 DIAGNOSIS — K43.9 VENTRAL HERNIA WITHOUT OBSTRUCTION OR GANGRENE: Primary | ICD-10-CM

## 2024-12-16 DIAGNOSIS — I42.8 OTHER CARDIOMYOPATHY (H): ICD-10-CM

## 2024-12-16 DIAGNOSIS — E78.5 HYPERLIPIDEMIA LDL GOAL <130: ICD-10-CM

## 2024-12-16 PROBLEM — I82.A12 ACUTE DEEP VEIN THROMBOSIS (DVT) OF AXILLARY VEIN OF LEFT UPPER EXTREMITY (H): Status: RESOLVED | Noted: 2023-07-04 | Resolved: 2024-12-16

## 2024-12-16 PROBLEM — I82.A11 ACUTE DEEP VEIN THROMBOSIS (DVT) OF AXILLARY VEIN OF RIGHT UPPER EXTREMITY (H): Status: RESOLVED | Noted: 2022-02-28 | Resolved: 2024-12-16

## 2024-12-16 LAB
ALBUMIN SERPL BCG-MCNC: 4.1 G/DL (ref 3.5–5.2)
ALP SERPL-CCNC: 120 U/L (ref 40–150)
ALT SERPL W P-5'-P-CCNC: 71 U/L (ref 0–70)
ANION GAP SERPL CALCULATED.3IONS-SCNC: 16 MMOL/L (ref 7–15)
AST SERPL W P-5'-P-CCNC: 77 U/L (ref 0–45)
BILIRUB SERPL-MCNC: 0.4 MG/DL
BUN SERPL-MCNC: 8.6 MG/DL (ref 6–20)
CALCIUM SERPL-MCNC: 8.4 MG/DL (ref 8.8–10.4)
CHLORIDE SERPL-SCNC: 103 MMOL/L (ref 98–107)
CREAT SERPL-MCNC: 0.76 MG/DL (ref 0.67–1.17)
EGFRCR SERPLBLD CKD-EPI 2021: >90 ML/MIN/1.73M2
ERYTHROCYTE [DISTWIDTH] IN BLOOD BY AUTOMATED COUNT: 19.4 % (ref 10–15)
FASTING STATUS PATIENT QL REPORTED: YES
FASTING STATUS PATIENT QL REPORTED: YES
GLUCOSE SERPL-MCNC: 73 MG/DL (ref 70–99)
HCO3 SERPL-SCNC: 25 MMOL/L (ref 22–29)
HCT VFR BLD AUTO: 39.7 % (ref 40–53)
HGB BLD-MCNC: 13.3 G/DL (ref 13.3–17.7)
MCH RBC QN AUTO: 29.4 PG (ref 26.5–33)
MCHC RBC AUTO-ENTMCNC: 33.5 G/DL (ref 31.5–36.5)
MCV RBC AUTO: 88 FL (ref 78–100)
PLATELET # BLD AUTO: 201 10E3/UL (ref 150–450)
POTASSIUM SERPL-SCNC: 3.8 MMOL/L (ref 3.4–5.3)
PROT SERPL-MCNC: 6.9 G/DL (ref 6.4–8.3)
RBC # BLD AUTO: 4.52 10E6/UL (ref 4.4–5.9)
SODIUM SERPL-SCNC: 144 MMOL/L (ref 135–145)
TRIGL SERPL-MCNC: 85 MG/DL
WBC # BLD AUTO: 6.7 10E3/UL (ref 4–11)

## 2024-12-16 PROCEDURE — 90673 RIV3 VACCINE NO PRESERV IM: CPT | Performed by: INTERNAL MEDICINE

## 2024-12-16 PROCEDURE — 91320 SARSCV2 VAC 30MCG TRS-SUC IM: CPT | Performed by: INTERNAL MEDICINE

## 2024-12-16 PROCEDURE — 85027 COMPLETE CBC AUTOMATED: CPT | Performed by: INTERNAL MEDICINE

## 2024-12-16 PROCEDURE — G2211 COMPLEX E/M VISIT ADD ON: HCPCS | Performed by: INTERNAL MEDICINE

## 2024-12-16 PROCEDURE — 90480 ADMN SARSCOV2 VAC 1/ONLY CMP: CPT | Performed by: INTERNAL MEDICINE

## 2024-12-16 PROCEDURE — 99214 OFFICE O/P EST MOD 30 MIN: CPT | Performed by: INTERNAL MEDICINE

## 2024-12-16 PROCEDURE — G0008 ADMIN INFLUENZA VIRUS VAC: HCPCS | Performed by: INTERNAL MEDICINE

## 2024-12-16 PROCEDURE — 36415 COLL VENOUS BLD VENIPUNCTURE: CPT | Performed by: INTERNAL MEDICINE

## 2024-12-16 PROCEDURE — 80053 COMPREHEN METABOLIC PANEL: CPT | Performed by: INTERNAL MEDICINE

## 2024-12-16 PROCEDURE — 84478 ASSAY OF TRIGLYCERIDES: CPT | Performed by: INTERNAL MEDICINE

## 2024-12-16 ASSESSMENT — PAIN SCALES - GENERAL: PAINLEVEL_OUTOF10: NO PAIN (0)

## 2024-12-16 NOTE — TELEPHONE ENCOUNTER
Writer spoke with EUGENE Rose. Nystatin is used around G tube site. Past prescribed by Dr. Vyas, wondering if Dr. Isaac would be willing to prescribe or if needing to contact Dr. Vyas.     Sid Montgomery RN on 12/16/2024 at 3:39 PM

## 2024-12-16 NOTE — PROGRESS NOTES
Is Pt currently in [place]? Yes    NOTE:  If Pt is not in [place], Appointment needs to be canceled and rescheduled.  Parker is a 54 year old who is being evaluated via a billable video visit.    How would you like to obtain your AVS? MyChart  If the video visit is dropped, the invitation should be resent by: Text to cell phone: [phone]  Will anyone else be joining your video visit? No    Angeline Pardo MA    Video visit  Video start: 0838  Video end: 0854  Patient location: home  Provider is on-site in clinic  Platform used: [facility]      12/17/2024    Chief complaint:   -Ongoing abdominal pain  -also has a large abdominal hernia--this may need to be surgically repaired        Interval history:  Parker Acevedo is a 54 year old male is known to me for:  Visceral hyperalgesia  Chronic abdominal pain  Chronic pain syndrome  PMHx includes: ADHD, anxiety, cardiomyopathy and nutritional diseases, chronic abdominal pain, central line associated bloodstream infection recurrent, anesthesia complication, difficulty swallowing, gastric ulcer unspecified as acute or chronic without mention of hemorrhage perforation or obstruction, gastroesophageal reflux disease, head injury, hiatal hernia, other bladder disorder, other chronic pain, postop nausea and vomiting, severe malnutrition on TPN, short gut syndrome, tobacco abuse  PSHx includes: Appendectomy, back surgery (11/3/2014), biopsy of cervical lymph node (2/20/2015), cholecystectomy, colonoscopy (2021, 2022), cervical anterior 1 level discectomy and fusion (2/15/2017), endoscopic insertion tube gastrostomy (9/9/2013), endoscopic ultrasound upper gastrointestinal tract (4/29/2011), endoscopic ultrasound upper gastrointestinal tract (9/9/2013), endoscopic ultrasound upper gastrointestinal tract (2/24/2021), endoscopy (3/25/2011, 8/4/2009, 1/5/2009), multiple EGDs, gastrectomy (6/22/2012), gastrojejunostomy (8/26/2009), umbilical  hernia repair (2006), hernia raphe hiatal (6/22/2012), herniorrhaphy inguinal (11/22/2013), insert PICC line on the right (12/19/2019), insert PICC line right (2/21/2020), insert vascular access port on the right (12/19/2017, 8/2/2018), multiple interventional radiology CVC tunnel placement and removals, exploratory laparotomy (11/22/2013), orthopedic surgery, Celestino-en-Y gastric bypass (2003), tonsillectomy.        Recommendations/plan at the last visit on 10/14/2024 included:  Physical Therapy: none  Clinical Health Psychologist to address issues of relaxation, behavioral change, coping style, and other factors important to improvement: none  Continue gentle movement at home  Diagnostic Studies: none  Medication Management:   Continue fentanyl patch 25mcg/hr every 48 hours, fill 10/17  and start 10/24  Oxycodone liquid 5mg/5ml, take 15mg at bedtime (15m.) and may take 10mg (10ml) every 4 hours max of 3 doses per day of 10mg. Max of 45mg (45ml) per day.  Fill 10/17 and start 10/24.   Allow early fill due to travel out of town and won't be home for a week.   Reminded not to drink alcohol while on opiate meds   Further procedures recommended: none  Recommendations/follow-up for PCP:  See above  Release of information: none  Signed CSA today  UDT last took oxycodone this afternoon at 2 pm, wearing Fentanyl. He admitted to drinking a beer yesterday  Follow up: 2 months can be in-person or virtual          Since his last visit, Parker Acevedo reports:    Interval history December 17, 2024  -overall, is doing okay.   -umbilical hernia is getting larger, seen by Dr. Isaac recently and they recommended he see his abdominal surgeon again.   -ongoing abdominal pain  and visceral hyperalgesia from multiple previous surgical abdominal procedures with complex closure.   -notes his liver enzymes are slightly elevated. These are being followed by his Primary Care Provider   -not sleeping well, asks about what I think about  increasing his alprazolam. Discussed that I recommend against increasing benzodiazepines while on opiates as doing so increases risks for opiate overdose and death   -discussed possible use of hydroxyzine for improved sleep, patient prefers not to add to medication list  -discussed trial of herbal tea or hot milk at bedtime, he will try this.       Interval history 2024  -did not like medical cannabis, made him have side effects  Will be needing repeat surgery for huge umbilical hernia, he could not tolerate the girdle Dr. Vyas gave him, made his feeding tube leak  Admits he spilled about 200ml of the oxycodone liquid, he has reduced his use to stretch his supply as he knows that insurance won't cover any additional.   -leaving this Friday to be out of town for a week for a , requests early refill, will not start his meds until 10/24.  He requests to be able to use 5mg more per day, he desires to use 15mg at bedtime as he has increased abdominal pain when he does his overnight feedings.     Interval history 2024  -he is getting settled in his new home.   -has a new abdominal hernia in the last month from carrying large boxes during the move.   -ongoing abdominal pain, he feels that this pain is worse due to the hernia.  -he is still on IV antibiotics given to him by home health  -he tried medical cannabis, he did not like how he felt on cannabis  -he had stopped taking Lyrica a while ago. He did find it helpful. Will re-start this for improvement of neuropathic portion of pain    Interval history 2024  -chronic neuropathic abdominal pain remains.   -he states his pain is worse right now.   -they are in the middle of moving and significantly downsizing. This has been quite stressful, so he believes the increase in pain is stress related.   -stable on current medication regimen     Pain history collected at initial visit on 2022  He had gastric bypass in  and about 5  "years after that, he developed gastric ulcers. He ended up having surgery for gastric ulcer and hernias and such. He has had over 11 surgeries for his abdomen. He is malnourished due to short-gut syndrome. He has a J-tube that is open to vent bile from his stomach as he gets bloated.   Worst pain is inside the abdominal cavity. He will have pain so bad that he states he screams for his mother. He feels that this is a deep inside pain.   -he would like to increase his oxycodone dose by one dose per day. His activity is limited by not having medication to cover him for his daily activities.      -he has a DVT in his right arm and in his right jugular vein. He is on lovenox.         At this point, the patient's participation with our multidisciplinary team includes:  The patient has been compliant with the program.  PT - none  Health Psych - none      Pain scores:   Pain right now is 4/10.   Pain average score is 3-4/10.   Pain range is 3-8/10. His pain will pop up but improves over about a half-hour after taking his medication.   Pain is described as \"sharp, burning, agonizing and like on fire, has some dull right and Left UQ.\"  Pain is constant in nature    Current pain relevant medications:   -tylenol 32mg/ml liquid take 15.65mls (500mg) Q 4 hours PRN fever/mild pain  (somewhat helpful for headaches)  --Duragesic 25mcg/hr Q 48 hours (helpful)  -lidoderm 5% patch PRN (helpful)  -Narcan nasal spray for opiate reversal   -Oxycodone 5mg/5ml solution take 5-10mls (5-10mg) TID PRN max of 40mg/day with 4 hours between doses.  (helpful)     Other pertinent medications:  -Adderall 20mg every day  -LOVENOX 120mg Subcutaneously every day  -lorazepam 1mg for anxiety with TPN and meds once per day (uses most nights)  -Zofran 8mg Q 8 hours PRN     Previous medication treatments included:  OPIATES: Fentanyl (helpful), morphine (hallucinations), Dilaudid (not helpful, did not dissolve), Tylenol #3 (not helpful), hydrocodone (not " helpful), Belbuca (not helpful--he had a really heavy chest feeling)  NSAIDS: cannot take due to severe gastric ulcer disease  MUSCLE RELAXANTS: none  ANTI-MIGRAINE MEDS: none  ANTI-DEPRESSANTS: none  SLEEP AIDS: benadryl (helpful)  ANTI-CONVULSANTS: gabapentin (bad headaches and acid reflux),  TOPICALS: Lidocaine patches (helpful)  ANXIOLYTICS: valium (helpful 5mg dosage), lorazepam (helpful)  MEDICAL CANNABIS: not interested  Other meds: tylenol (helpful for headaches)        Other treatments have included:  Parker Acevedo has been seen at a pain clinic in the past.  Previously managed here by Dr. Jessica Milligan. Also seen by [facility] for possible implanted intrathecal pain pump, he is not candidate due to infection risk.   PT: tried, never helped his neck or abdominal surgery  Massage Therapy: helpful for a day or two  Chiropractic care: tried, helped some   Acupuncture: tried, not helpful  TENs Unit: tried, not helpful  Healing Touch: not helpful     Injections:   -previously had injections for his neck with Dr. Jessica Milligan      Surgeries:  9/30/2023 exploratory laparotomy, reduction of intussusception, resection of small bowel with primary anastamosis, and upper endoscopy with Dr. Mercado (helpful)      THE 4 A's OF OPIOID MAINTENANCE ANALGESIA    Analgesia: keeps pain pretty manageable    Activity: he walks daily, light housekeeping    Adverse effects: none    Adherence to Rx protocol: yes      Side Effects: no side effect  Patient is using the medication as prescribed: YES    Medications:  Current Outpatient Medications   Medication Sig Dispense Refill    albuterol (VENTOLIN HFA) 108 (90 Base) MCG/ACT inhaler Inhale 2 puffs into the lungs every 6 hours as needed 18 g 1    ALPRAZolam (XANAX) 0.5 MG tablet TAKE ONE TABLET BY MOUTH TWICE A DAY AS NEEDED FOR SLEEP OR FOR ANXIETY 60 tablet 0    amphetamine-dextroamphetamine (ADDERALL) 20 MG tablet TAKE ONE TABLET BY MOUTH ONCE DAILY 30 tablet 0    carvedilol  (COREG) 6.25 MG tablet TAKE 2 TABLETS (12.5 MG) BY MOUTH 2 TIMES DAILY WITH MEALS 360 tablet 0    cyanocobalamin (CYANOCOBALAMIN) 1000 mcg/mL injection Inject 1 mL (1,000 mcg) into the muscle every 30 days. 3 mL 0    enoxaparin ANTICOAGULANT (LOVENOX) 80 MG/0.8ML syringe Inject 0.8 mLs (80 mg) Subcutaneous 2 times daily INJECT THE CONTENTS OF ONE SYRINGE (80MG) UNDER THE SKIN TWO TIMES A DAY 67.2 mL 0    fentaNYL (DURAGESIC) 25 mcg/hr 72 hr patch Place 1 patch onto the skin every 48 hours. 30 day supply for chronic pain. OK to fill 12/21/24 and start 12/23/24. 15 patch 0    lipids plant base (CLINOLIPID) 20 % infusion Inject 250 mLs into the vein every 24 hours 1000 mL 3    naloxone (NARCAN) 4 MG/0.1ML nasal spray Spray 1 spray (4 mg) into one nostril alternating nostrils as needed for opioid reversal. every 2-3 minutes until assistance arrives 0.2 mL 3    nystatin (MYCOSTATIN) 464179 UNIT/GM external cream Apply topically daily as needed for other (around area of J tube)      ondansetron (ZOFRAN ODT) 8 MG ODT tab DISSOLVE ONE TABLET BY MOUTH EVERY 8 HOURS AS NEEDED FOR NAUSEA 90 tablet 1    oxyCODONE (ROXICODONE) 5 MG/5ML solution Take 15 mLs (15 mg) by mouth at bedtime. May also take 10 mLs (10 mg) every 4 hours as needed for moderate to severe pain. Max of 45mg/day. 30 day supply for chronic pain. OK to fill 12/21/24 and start 12/23/24.. 1350 mL 0    parenteral nutrition - PTA/DISCHARGE ORDER Infuse daily. Managed by [employer] [phone]  Total volume: 1260 mL  Cycled over 12 hr  Clinisol 15% 100 gram/day  Dextrose 70% 175 gram/day  Na Acetate 35 mEq/day  K Chloride 85 mEq/day  Mg sulfate 6 mEq/day  Ca gluconate 25 mEq/day  MVI 10 mL/day  Trace 1 mL/day  Zinc 5 mg /day  Famotidine 20 mg/day  Cl:Ace=1: 2.4  Intralipid 20% 50 gram/day ([facility] only)      polyethylene glycol (MIRALAX) 17 GM/Dose powder Take 17 g by mouth as needed for constipation      sucralfate (CARAFATE) 1 GM/10ML suspension Take 10 mLs (1  "g) by mouth 4 times daily as needed for nausea. 414 mL 1    Syringe/Needle, Disp, (B-D ECLIPSE SYRINGE) 27G X 1/2\" 1 ML MISC 1 Device every 30 days. 30 each 1    vitamin D2 (ERGOCALCIFEROL) 81604 units (1250 mcg) capsule Take 1 capsule (50,000 Units) by mouth once a week (Patient taking differently: Take 50,000 Units by mouth once a week. On Mondays) 12 capsule 3       Medical History: any changes in medical history since they were last seen? No    Social History:   Home situation:  to Vandana, one son in late 20's   Occupation/Schooling: he used to work at [employer]. He is on disability, SSDI  Tobacco use: smokes 1/2 ppd,  Alcohol use: none  Drug use: none  History of chemical dependency treatment: none        Physical Exam:  Vital signs: There were no vitals taken for this visit.    Behavioral observations:  Awake, alert. Cooperative.   Pulm: respirations easy and unlabored. Able to speak in full sentences without SOB or cough noted.            Diagnostic tests:  CT ABDOMEN PELVIS W CONTRAST  2/16/2019 3:00 AM      HISTORY: Right-sided abdominal pain, status post gastric bypass.  Patient has G-tube with blood and history of ulcers.     TECHNIQUE: CT abdomen and pelvis with 85 mL Isovue-370 IV. Radiation  dose for this scan was reduced using automated exposure control,  adjustment of the mA and/or kV according to patient size, or iterative  reconstruction technique.     COMPARISON: 1/13/2015.     FINDINGS:  Abdomen: There is mild dependent atelectasis at the lung bases. The  heart size is normal. Small hiatal hernia. There is mild biliary  dilatation into the pancreas head which is probably normal post  cholecystectomy. The liver otherwise appears normal. The gallbladder  is absent. The spleen, pancreas, adrenal glands and kidneys are normal  in appearance. There is a G-tube in place. No abdominal or pelvic  lymph node enlargement.     Pelvis: The ascending colon and transverse colon are very " distended  with gas, stool and fluid. The descending and rectosigmoid colon are  nondilated. Transition point is in the region of the splenic flexure,  but there is no convincing obstruction. Small bowel is normal in  caliber. The appendix is normal. There is no free intraperitoneal gas  or fluid.                                                                      IMPRESSION:  1. The ascending and transverse colon are distended with gas, stool  and fluid. Distal colon is nondilated. No focal obstruction is  evident.  2. Small hiatal hernia.     IMANI HU MD        MR CERVICAL SPINE WITHOUT CONTRAST  1/2/2017  2:44 PM     HISTORY: Injury to neck 2 weeks ago with development of suspected  radicular pain to the left upper extremity with weakness of thumb and  index finger.     COMPARISON: Plain films 12/22/2016.     TECHNIQUE: Routine MR cervical spine extended through T2.     FINDINGS: Vertebral body bone marrow signal is normal and the  alignment is normal through T2. No malignant or destructive changes.  The cervical and upper thoracic spinal cord appear intrinsically  normal through T2. Craniocervical junction region is normal. No  paraspinous soft tissue abnormality.     Findings by level as follows:     C2-C3: Negative. No disc protrusion. No central or lateral stenosis.     C3-C4: Negative.     C4-C5: Very tiny central disc protrusion. No significant central or  lateral stenosis.     C5-C6: Moderate degenerative narrowing of the interspace. Mild  broad-based disc osteophyte complex and small uncinate spurs. Minimal  central stenosis and minimal bilateral foraminal stenosis.     C6-C7: Moderate degenerative narrowing of the interspace. No disc  protrusion. No central or lateral stenosis.     C7-T1: Negative.                                                                      IMPRESSION:  1. Degenerative changes as described, most marked at C5-C6 and C6-C7.  2. No intrinsic cord abnormality through T2.      MARTHA MCCARTY MD           MR LUMBAR SPINE W/O & W CONTRAST 1/6/2019 3:49 PM     Provided History: Back pain, > 6wks conservative tx, persistent sx;  pain in the left paraspinal region- now with fungemia, persistent  blood stream infections since Oct 2018.     Comparison: No similar studies     Technique: Sagittal T1-weighted and T2-weighted and axial T2-weighted  images of the lumbar spine were obtained without intravenous contrast.  Post intravenous contrast using gadolinium axial and sagittal  T1-weighted images were obtained with fat saturation.     Contrast: 8.9CC GADAVIST     Findings: Regarding numbering convention, there are 5 lumbar-type  vertebrae assumed for the purposes of this dictation.  The tip of the  conus medullaris is at L1.  Regarding alignment, the lumbar vertebral  column appears normally aligned.  There is no significant disc height  narrowing at any level.  Regarding bone marrow signal intensity, no  abnormality is visualized on STIR images.     On a level by level basis:     L1-2: No significant spinal canal or foraminal narrowing.     L2-3: Minimal disc bulge. Mild thickening of the ligamentum flavum. No  significant spinal canal or foraminal narrowing.     L3-4: Mild disc bulge and thickening of the ligamentum flavum with  mild spinal canal narrowing. No neural foraminal narrowing.     L4-5: Mild disc bulge and thickening of the ligamentum flavum. No  central spinal canal narrowing or neuroforaminal stenosis.      L5-S1: No significant spinal canal or foraminal narrowing.     3 bandlike areas of high STIR signal and enhancement in the right  posterior paraspinous muscles.                                                                      Impression:  1. No abnormal signal within the spine to suggest fungal infection.  Mild nonspecific enhancement and STIR hyperintensity in the right  posterior paraspinous muscles, potentially myositis.  2. Multilevel lumbar spondylosis.     I have  personally reviewed the examination and initial interpretation  and I agree with the findings.     YOLA TELLEZ MD           Other testing (labs, diagnostics):  6/18/2024  Cr. 0.78  Est GFR >90        Screening tools:      DIRE Score for ongoing opioid management is calculated as follows:     Diagnosis = 2     Intractability = 2     Risk: Psych = 3  Chem Hlth = 2  Reliability = 2  Social = 2     Efficacy = 2     Total DIRE Score = 15 (14 or higher predicts good candidate for ongoing opioid management; 13 or lower predicts poor candidate for opioid management)         [place] Prescription Monitoring Program:  Reviewed [place]  12/17/2024- no concerning fills.  Natalie MENARD, RN CNP, FNP  [facility]  [place] location          Assessment:   Visceral hyperalgesia  Chronic abdominal pain  Jejunal intusseption  Chronic bilateral low back pain without sciatica  Chronic pain syndrome  Chronic continuous use of opioids    Encounter for long term use of opiate analgesic (current use)  CSA signed 10/14/2024  UDT 10/14/2024 wearing Fentanyl and oxycodone at 2pm  Long term current use of opiate analgesic  PMHx includes: ADHD, anxiety, cardiomyopathy and nutritional diseases, chronic abdominal pain, central line associated bloodstream infection recurrent, anesthesia complication, difficulty swallowing, gastric ulcer unspecified as acute or chronic without mention of hemorrhage perforation or obstruction, gastroesophageal reflux disease, head injury, hiatal hernia, other bladder disorder, other chronic pain, postop nausea and vomiting, severe malnutrition on TPN, short gut syndrome, tobacco abuse  PSHx includes: Appendectomy, back surgery (11/3/2014), biopsy of cervical lymph node (2/20/2015), cholecystectomy, colonoscopy (2021, 2022), cervical anterior 1 level discectomy and fusion (2/15/2017), endoscopic insertion tube gastrostomy (9/9/2013), endoscopic ultrasound upper gastrointestinal tract  (4/29/2011), endoscopic ultrasound upper gastrointestinal tract (9/9/2013), endoscopic ultrasound upper gastrointestinal tract (2/24/2021), endoscopy (3/25/2011, 8/4/2009, 1/5/2009), multiple EGDs, gastrectomy (6/22/2012), gastrojejunostomy (8/26/2009), umbilical hernia repair (2006), hernia raphe hiatal (6/22/2012), herniorrhaphy inguinal (11/22/2013), insert PICC line on the right (12/19/2019), insert PICC line right (2/21/2020), insert vascular access port on the right (12/19/2017, 8/2/2018), multiple interventional radiology CVC tunnel placement and removals, exploratory laparotomy (11/22/2013), orthopedic surgery, Celestino-en-Y gastric bypass (2003), tonsillectomy.        Plan:   Physical Therapy: none  Clinical Health Psychologist to address issues of relaxation, behavioral change, coping style, and other factors important to improvement: none  Continue gentle movement at home  Diagnostic Studies: none  Medication Management:   Continue fentanyl patch 25mcg/hr every 48 hours, fill 12/20 and start 12/23    Oxycodone liquid 5mg/5ml, take 15mg at bedtime (15m.) and may take 10mg (10ml) every 4 hours max of 3 doses per day of 10mg. Max of 45mg (45ml) per day.  Fill 12/20 and start 12/23  Reminded not to drink alcohol while on opiate meds   Further procedures recommended: none  Recommendations/follow-up for PCP:  See above  Release of information: none  Follow up: 3 months can be in-person or virtual      ASSESSMENT AND PLAN:  (R19.8) Visceral hyperalgesia  (primary encounter diagnosis)  Comment:   Plan: fentaNYL (DURAGESIC) 25 mcg/hr 72 hr patch,         oxyCODONE (ROXICODONE) 5 MG/5ML solution, Adult        Pain Clinic Follow-Up Order            (R10.9,  G89.29) Chronic abdominal pain  Comment:   Plan: fentaNYL (DURAGESIC) 25 mcg/hr 72 hr patch,         oxyCODONE (ROXICODONE) 5 MG/5ML solution, Adult        Pain Clinic Follow-Up Order            (K56.1) Jejunal intussusception (H)  Comment:   Plan: Adult Pain Clinic  Follow-Up Order            (M54.50,  G89.29) Chronic bilateral low back pain without sciatica  Comment:   Plan: fentaNYL (DURAGESIC) 25 mcg/hr 72 hr patch,         oxyCODONE (ROXICODONE) 5 MG/5ML solution, Adult        Pain Clinic Follow-Up Order            (G89.4) Chronic pain syndrome  Comment:   Plan: fentaNYL (DURAGESIC) 25 mcg/hr 72 hr patch,         oxyCODONE (ROXICODONE) 5 MG/5ML solution, Adult        Pain Clinic Follow-Up Order            (F11.90) Chronic, continuous use of opioids  Comment:   Plan: fentaNYL (DURAGESIC) 25 mcg/hr 72 hr patch,         oxyCODONE (ROXICODONE) 5 MG/5ML solution, Adult        Pain Clinic Follow-Up Order              BILLING TIME DOCUMENTATION:   TOTAL TIME includes:   Time spent preparing to see the patient: 3 minutes (reviewing records and tests)  Time spend face to face with the patient: 16 minutes  Time spent ordering tests, medications, procedures and referrals: 0 minutes  Time spent Referring and communicating with other healthcare professionals: 0 minutes  Documenting clinical information in Epic: 6 minutes    The total TIME spent on this patient on the day of the appointment was 25 minutes.              Natalie MENARD, RN CNP, FNP  [facility]  [facility] [place]

## 2024-12-16 NOTE — PROGRESS NOTES
"  Assessment & Plan   Problem List Items Addressed This Visit       Other cardiomyopathy (H)    Hyperlipidemia LDL goal <130     Other Visit Diagnoses       Ventral hernia without obstruction or gangrene    -  Primary    Severe malnutrition (H)               Patient has a long medical history including bariatric surgery and trouble with his stomach since then therefore always on TPN and lipids and IV hydration through a clean catheter with multiple Aikman infections and bacteremia.  He continues to have issues with chronic pain, anxiety and ADHD he is on Xanax, Adderall, narcotics.    With a history of his abdominal surgery he now has an umbilical hernia which is quite large does reduce for me today.  He would like to have this fixed as it gives him pain with bowel movements and straining and coughing.  He has had previous hernias fixed I do recommend he go back to his gastric surgeon through the [facility].  I do not think our general surgeons will do his surgery here.  He is high risk with his chance of infection, mild malnutrition and narcotic use.  He agrees with this plan I did send a message to his surgeon.  They will also contact him about setting up a visit and possible pair.  I did tell him that maybe risky and difficult to repair.     Nicotine/Tobacco Cessation  He reports that he has been smoking cigarettes. He has a 1.5 pack-year smoking history. He has never used smokeless tobacco.  Nicotine/Tobacco Cessation Plan  Information offered: Patient not interested at this time      BMI  Estimated body mass index is 28.24 kg/m  as calculated from the following:    Height as of this encounter: 1.75 m (5' 8.9\").    Weight as of this encounter: 86.5 kg (190 lb 11.2 oz).       The longitudinal plan of care for the diagnosis(es)/condition(s) as documented were addressed during this visit. Due to the added complexity in care, I will continue to support Parker in the subsequent management and with ongoing " "continuity of care.      Subjective   Parker is a 54 year old, presenting for the following health issues:  Follow Up (Hernia)        12/16/2024    12:56 PM   Additional Questions   Roomed by Lore     History of Present Illness       Reason for visit:  Followup-Hernia He is missing 7 dose(s) of medications per week.     Abdominal hernia that is getting bigger with straining has some pain, wants to have it fixed.   Pain clinic upped oxycodone another 5 mg, 45 mg a  day total.     Sees Dr Vyas for his past bariatric surgery and previous hernias.      Central azevedo catheter, no fevers.  Doing IV therapy, no current homecare.  TPN, lipds, and hydration.      I still do his ADderall and Xanax, stable, no side effects.             Objective    /82 (BP Location: Left arm, Patient Position: Sitting, Cuff Size: Adult Regular)   Pulse 82   Temp 97.5  F (36.4  C) (Temporal)   Resp 16   Ht 1.75 m (5' 8.9\")   Wt 86.5 kg (190 lb 11.2 oz)   SpO2 98%   BMI 28.24 kg/m    Body mass index is 28.24 kg/m .  Physical Exam   NAD  Abdomen is scarred with a gastric tube in the left upper quadrant, obvious ventral/umbilical hernia.  This is about 1/2 inches in diameter patient hernia is reducible today when he lays back and relaxes.        Signed Electronically by: Chuy Isaac MD  "

## 2024-12-17 ENCOUNTER — TELEPHONE (OUTPATIENT)
Dept: SURGERY | Facility: CLINIC | Age: 54
End: 2024-12-17

## 2024-12-17 ENCOUNTER — VIRTUAL VISIT (OUTPATIENT)
Dept: PALLIATIVE MEDICINE | Facility: CLINIC | Age: 54
End: 2024-12-17
Attending: NURSE PRACTITIONER
Payer: COMMERCIAL

## 2024-12-17 ENCOUNTER — MYC MEDICAL ADVICE (OUTPATIENT)
Dept: INTERNAL MEDICINE | Facility: CLINIC | Age: 54
End: 2024-12-17

## 2024-12-17 ENCOUNTER — NURSE TRIAGE (OUTPATIENT)
Dept: INTERNAL MEDICINE | Facility: CLINIC | Age: 54
End: 2024-12-17

## 2024-12-17 DIAGNOSIS — G89.29 CHRONIC ABDOMINAL PAIN: ICD-10-CM

## 2024-12-17 DIAGNOSIS — M54.50 CHRONIC BILATERAL LOW BACK PAIN WITHOUT SCIATICA: ICD-10-CM

## 2024-12-17 DIAGNOSIS — R19.8 VISCERAL HYPERALGESIA: Primary | ICD-10-CM

## 2024-12-17 DIAGNOSIS — G89.4 CHRONIC PAIN SYNDROME: ICD-10-CM

## 2024-12-17 DIAGNOSIS — K56.1 JEJUNAL INTUSSUSCEPTION (H): ICD-10-CM

## 2024-12-17 DIAGNOSIS — R10.9 CHRONIC ABDOMINAL PAIN: ICD-10-CM

## 2024-12-17 DIAGNOSIS — G89.29 CHRONIC BILATERAL LOW BACK PAIN WITHOUT SCIATICA: ICD-10-CM

## 2024-12-17 DIAGNOSIS — F11.90 CHRONIC, CONTINUOUS USE OF OPIOIDS: ICD-10-CM

## 2024-12-17 PROCEDURE — G2211 COMPLEX E/M VISIT ADD ON: HCPCS | Mod: 95 | Performed by: NURSE PRACTITIONER

## 2024-12-17 PROCEDURE — 99213 OFFICE O/P EST LOW 20 MIN: CPT | Mod: 95 | Performed by: NURSE PRACTITIONER

## 2024-12-17 RX ORDER — NYSTATIN 100000 U/G
CREAM TOPICAL DAILY PRN
Qty: 30 G | Refills: 3 | Status: SHIPPED | OUTPATIENT
Start: 2024-12-17

## 2024-12-17 RX ORDER — FENTANYL 25 UG/1
1 PATCH TRANSDERMAL
Qty: 15 PATCH | Refills: 0 | Status: SHIPPED | OUTPATIENT
Start: 2024-12-17

## 2024-12-17 RX ORDER — ALBUTEROL SULFATE 90 UG/1
2 INHALANT RESPIRATORY (INHALATION) EVERY 6 HOURS PRN
Qty: 18 G | Refills: 1 | Status: SHIPPED | OUTPATIENT
Start: 2024-12-17

## 2024-12-17 RX ORDER — DEXTROAMPHETAMINE SACCHARATE, AMPHETAMINE ASPARTATE, DEXTROAMPHETAMINE SULFATE AND AMPHETAMINE SULFATE 5; 5; 5; 5 MG/1; MG/1; MG/1; MG/1
TABLET ORAL
Qty: 30 TABLET | Refills: 0 | OUTPATIENT
Start: 2024-12-17

## 2024-12-17 RX ORDER — SUCRALFATE ORAL 1 G/10ML
1 SUSPENSION ORAL 4 TIMES DAILY PRN
Qty: 414 ML | Refills: 1 | Status: SHIPPED | OUTPATIENT
Start: 2024-12-17

## 2024-12-17 RX ORDER — OXYCODONE HCL 5 MG/5 ML
SOLUTION, ORAL ORAL
Qty: 1350 ML | Refills: 0 | Status: SHIPPED | OUTPATIENT
Start: 2024-12-17

## 2024-12-17 RX ORDER — ALPRAZOLAM 0.5 MG
TABLET ORAL
Qty: 60 TABLET | Refills: 0 | OUTPATIENT
Start: 2024-12-17

## 2024-12-17 ASSESSMENT — PAIN SCALES - GENERAL: PAINLEVEL_OUTOF10: MODERATE PAIN (4)

## 2024-12-17 NOTE — TELEPHONE ENCOUNTER
Left message with call back information and informed caller Dr Vyas last saw patient 8/8/24. Can fax chart note if fax number provided.

## 2024-12-17 NOTE — TELEPHONE ENCOUNTER
Nurse Triage SBAR    Is this a 2nd Level Triage? NO    Situation: Patient reporting that his arm is painful and his hand is swollen. He received his Covid vaccine yesterday. See photos below    Right hand    Attachments   [id number]     [id number]    Assessment: No redness or warmth to his arm. Reports his hand is swollen. Asked patient to send photos so they can be reviewed by PCP. NO fevers or other symptoms at this time. Denies pain.    Protocol Recommended Disposition:   No disposition on file.    Recommendation: Patient will send in photos. Please review.    Routed to provider    Does the patient meet one of the following criteria for ADS visit consideration? No    Angel Thomas RN on 12/17/2024 at 4:14 PM      Reason for Disposition   COVID-19 vaccine, injection site reaction (e.g., pain, redness, swelling), questions about    Additional Information   Negative: Difficulty breathing or swallowing and starts within 2 hours after injection   Negative: Sounds like a life-threatening emergency to the triager   Negative: Symptoms of COVID-19 (e.g., cough, fever, SOB, or others) and within 14 days of COVID-19 EXPOSURE   Negative: Typical COVID-19 symptoms (e.g., cough, difficulty breathing, loss of taste or smell, runny nose, sore throat) that are NOT expected from vaccine   Negative: COVID-19 EXPOSURE and no symptoms, or symptoms not typical of COVID-19   Negative: Fever > 104 F (40 C)   Negative: Sounds like a severe, unusual reaction to the triager   Negative: Fever > 101 F (38.3 C) started > 48 hours after getting vaccine and over 60 years of age   Negative: Fever > 100.0 F (37.8 C) started > 48 hours after getting vaccine and bedridden (e.g., CVA, chronic illness, recovering from surgery)   Negative: Fever > 100.0 F (37.8 C) started > 48 hours after getting vaccine and diabetes mellitus or weak immune system (e.g., HIV positive, cancer chemo, splenectomy, organ  transplant, chronic steroids)   Negative: Fever > 100.0 F (37.8 C) and healthcare worker   Negative: Pain, tenderness, or swelling at the injection site and over 3 days (72 hours) since vaccine and getting worse   Negative: Redness around the injection site and started > 48 hours after getting vaccine  (Exception: Red area < 1 inch or 2.5 cm wide.)   Negative: Pain, tenderness, or swelling at the injection site and lasts > 7 days   Negative: Lymph node swelling (i.e., armpit or neck on side of vaccine) and lasts > 3 weeks   Negative: Requesting COVID-19 vaccine    Protocols used: Coronavirus (COVID-19) Vaccine Questions and [id number]

## 2024-12-17 NOTE — PATIENT INSTRUCTIONS
Plan:   Physical Therapy: none  Clinical Health Psychologist to address issues of relaxation, behavioral change, coping style, and other factors important to improvement: none  Continue gentle movement at home  Diagnostic Studies: none  Medication Management:   Continue fentanyl patch 25mcg/hr every 48 hours, fill 12/20 and start 12/23    Oxycodone liquid 5mg/5ml, take 15mg at bedtime (15m.) and may take 10mg (10ml) every 4 hours max of 3 doses per day of 10mg. Max of 45mg (45ml) per day.  Fill 12/20 and start 12/23  Reminded not to drink alcohol while on opiate meds   Further procedures recommended: none  Recommendations/follow-up for PCP:  See above  Release of information: none  Follow up: 3 months can be in-person or virtual    ----------------------------------------------------------------  Clinic Number:  [phone]   Call with any questions about your care and for scheduling assistance.   Calls are returned Monday through Friday between 8 AM and 4:30 PM. We usually get back to you within 2 business days depending on the issue/request.    If we are prescribing your medications:  For opioid medication refills, call the clinic or send a [employer] message 7 days in advance.  Please include:  Name of requested medication  Name of the pharmacy.  For non-opioid medications, call your pharmacy directly to request a refill. Please allow 3-4 days to be processed.   Per [place] State Law:  All controlled substance prescriptions must be filled within 30 days of being written.    For those controlled substances allowing refills, pickup must occur within 30 days of last fill.      We believe regular attendance is key to your success in our program!    Any time you are unable to keep your appointment we ask that you call us at least 24 hours in advance to cancel.This will allow us to offer the appointment time to another patient.   Multiple missed appointments may lead to dismissal from the clinic.

## 2024-12-17 NOTE — TELEPHONE ENCOUNTER
Called and spoke with patient. While on phone call dropped. Attempted to call back x2 went straight to voicemail. Did leave message with callback number and sent [employer] message as well.    Yolanda Soliman RN on 12/17/2024 at 4:31 PM

## 2024-12-18 NOTE — TELEPHONE ENCOUNTER
Provider already addressed in Triage encounter dated 12/17.     Sid Montgomery RN on 12/18/2024 at 7:49 AM

## 2025-01-02 ENCOUNTER — OFFICE VISIT (OUTPATIENT)
Dept: SURGERY | Facility: CLINIC | Age: 55
End: 2025-01-02
Payer: COMMERCIAL

## 2025-01-02 VITALS
SYSTOLIC BLOOD PRESSURE: 107 MMHG | DIASTOLIC BLOOD PRESSURE: 68 MMHG | WEIGHT: 202.1 LBS | HEART RATE: 85 BPM | OXYGEN SATURATION: 100 % | HEIGHT: 69 IN | BODY MASS INDEX: 29.93 KG/M2

## 2025-01-02 DIAGNOSIS — K43.2 INCISIONAL HERNIA, WITHOUT OBSTRUCTION OR GANGRENE: Primary | ICD-10-CM

## 2025-01-02 ASSESSMENT — PAIN SCALES - GENERAL: PAINLEVEL_OUTOF10: SEVERE PAIN (7)

## 2025-01-02 NOTE — LETTER
1/2/2025       RE: Parker Acevedo  [address]  [place] [address]     Dear Colleague,    Thank you for referring your patient, Parker Acevedo, to the [facility] [place] [facility]. Please see a copy of my visit note below.    I had the pleasure of seeing your patient, Parker Acevedo, in my post-bariatric assessment clinic.    As you know, he had morbid obesity and underwent rygb with two subsequent revisions to treat obesity in association with his medical conditions of obesity.    Is a GI failure and has narcotic bowel syndrome.    Had emergency surgery September 2023, laparotomy.    Has had increasing incisional hernia since that time.    Most recent weights:  Wt Readings from Last 4 Encounters:   01/02/25 91.7 kg (202 lb 1.6 oz)   12/16/24 86.5 kg (190 lb 11.2 oz)   09/05/24 88.8 kg (195 lb 11.2 oz)   08/12/24 90.8 kg (200 lb 3.2 oz)       Currently, his Body mass index is 29.84 kg/m .    ACTIVE MEDICAL PROBLEMS    Patient Active Problem List   Diagnosis     Peptic ulcer disease     Gastric bypass status for obesity     Chronic abdominal pain     Vomiting     Anemia     ADHD (attention deficit hyperactivity disorder), inattentive type     Vitamin B12 deficiency without anemia     Thiamine deficiency     Dehydration     Dysphagia     Weight loss, non-intentional     Malnutrition (H)     Chronic anxiety     Constipation     Bile reflux esophagitis     Former smoker     Vitamin D deficiency     Iron deficiency     Health Care Home     Chronic pain     Insomnia     Positive blood culture     Fungemia     Chronic nausea     Other cardiomyopathy (H)     Short gut syndrome     Anxiety     Status post cervical spinal arthrodesis     Port or reservoir infection, initial encounter     Abnormal echocardiogram     Low serum iron     Anemia, iron deficiency     Catheter-related bloodstream infection (CRBSI)     Generalized  "weakness     S/P bariatric surgery     Hyperlipidemia LDL goal <130     Infection by Candida species     PICC line infiltration, sequela     Gram-positive bacteremia     On total parenteral nutrition     Gastric outlet obstruction     Short bowel syndrome     Bloodstream infection associated with central venous catheter, initial encounter     Fever, unknown origin     Bacteremia associated with intravascular line, initial encounter (H)     Gram-negative bacteremia     Cellulitis of left upper extremity     Jejunal intussusception (H)     Bacteremia     History of Celestino-en-Y gastric bypass      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     Complication of anesthesia      Difficulty swallowing      Gastric ulcer, unspecified as acute or chronic, without mention of hemorrhage, perforation, or obstruction      Gastro-oesophageal reflux disease      Head injury      Hiatal hernia      Other bladder disorder      Other chronic pain      PONV (postoperative nausea and vomiting)      Severe malnutrition (H)     TPN     Short gut syndrome      Tobacco abuse        PHYSICAL EXAMINATION  /68 (BP Location: Left arm, Patient Position: Sitting, Cuff Size: Adult Regular)   Pulse 85   Ht 1.753 m (5' 9\")   Wt 91.7 kg (202 lb 1.6 oz)   SpO2 100%   BMI 29.84 kg/m     Body mass index is 29.84 kg/m .   NAD NCAT  Respiratory: breathing unlabored  Abdomen: s/nt/nd; inc c/d/i;     Hernia.              I reviewed the post-bariatric clinic questionnaire available in the epic system as a separate document.     Fluid intake: enough oz per day      Tolerating regular texture foods: no; TPN dependent.      Special testing:     Last CT scan from 2 months after emergency " laparotomy surgery.    Orders Placed This Encounter   Procedures     CT Abdomen Pelvis w/o Contrast         PLAN  1. Will review at hernia conference.  CT ordered.  See orders from encounter.  Smoker; narcotic dependent.  2. F/u routine bariatric diet guidelines.  3. Focus on eating until hunger goes away rather than eating all the way to full.   3. Start with protein sources of food, move to leafy vegetables next, and then eat solid fruits.   4. Follow-up: 6 months.       If you have any questions about our plans, please don't hesitate to contact me.         Francis Vyas MD  Surgery  [phone] (hospital )  [phone] (clinic nurses)            Again, thank you for allowing me to participate in the care of your patient.      Sincerely,    Francis Vyas MD

## 2025-01-02 NOTE — NURSING NOTE
"Chief Complaint   Patient presents with    RECHECK     Appt follow up       Vitals:    01/02/25 0859   BP: 107/68   BP Location: Left arm   Patient Position: Sitting   Cuff Size: Adult Regular   Pulse: 85   SpO2: 100%   Weight: 91.7 kg (202 lb 1.6 oz)   Height: 1.753 m (5' 9\")       Body mass index is 29.84 kg/m .                          Tino Leo, EMT    "

## 2025-01-02 NOTE — PROGRESS NOTES
I had the pleasure of seeing your patient, Parker Acevedo, in my post-bariatric assessment clinic.    As you know, he had morbid obesity and underwent rygb with two subsequent revisions to treat obesity in association with his medical conditions of obesity.    Is a GI failure and has narcotic bowel syndrome.    Had emergency surgery September 2023, laparotomy.    Has had increasing incisional hernia since that time.    Most recent weights:  Wt Readings from Last 4 Encounters:   01/02/25 91.7 kg (202 lb 1.6 oz)   12/16/24 86.5 kg (190 lb 11.2 oz)   09/05/24 88.8 kg (195 lb 11.2 oz)   08/12/24 90.8 kg (200 lb 3.2 oz)       Currently, his Body mass index is 29.84 kg/m .    ACTIVE MEDICAL PROBLEMS    Patient Active Problem List   Diagnosis    Peptic ulcer disease    Gastric bypass status for obesity    Chronic abdominal pain    Vomiting    Anemia    ADHD (attention deficit hyperactivity disorder), inattentive type    Vitamin B12 deficiency without anemia    Thiamine deficiency    Dehydration    Dysphagia    Weight loss, non-intentional    Malnutrition (H)    Chronic anxiety    Constipation    Bile reflux esophagitis    Former smoker    Vitamin D deficiency    Iron deficiency    Health Care Home    Chronic pain    Insomnia    Positive blood culture    Fungemia    Chronic nausea    Other cardiomyopathy (H)    Short gut syndrome    Anxiety    Status post cervical spinal arthrodesis    Port or reservoir infection, initial encounter    Abnormal echocardiogram    Low serum iron    Anemia, iron deficiency    Catheter-related bloodstream infection (CRBSI)    Generalized weakness    S/P bariatric surgery    Hyperlipidemia LDL goal <130    Infection by Candida species    PICC line infiltration, sequela    Gram-positive bacteremia    On total parenteral nutrition    Gastric outlet obstruction    Short bowel syndrome    Bloodstream infection associated with central venous catheter, initial encounter    Fever, unknown origin     "Bacteremia associated with intravascular line, initial encounter (H)    Gram-negative bacteremia    Cellulitis of left upper extremity    Jejunal intussusception (H)    Bacteremia    History of Celestino-en-Y gastric bypass      Past Medical History:   Diagnosis Date    ADHD (attention deficit hyperactivity disorder)     Anxiety     Cardiomyopathy in nutritional diseases (H)     mild EF ~45% on rest 2/13/17, improves with stressing    Chronic abdominal pain     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    Complication of anesthesia     Difficulty swallowing     Gastric ulcer, unspecified as acute or chronic, without mention of hemorrhage, perforation, or obstruction     Gastro-oesophageal reflux disease     Head injury     Hiatal hernia     Other bladder disorder     Other chronic pain     PONV (postoperative nausea and vomiting)     Severe malnutrition (H)     TPN    Short gut syndrome     Tobacco abuse        PHYSICAL EXAMINATION  /68 (BP Location: Left arm, Patient Position: Sitting, Cuff Size: Adult Regular)   Pulse 85   Ht 1.753 m (5' 9\")   Wt 91.7 kg (202 lb 1.6 oz)   SpO2 100%   BMI 29.84 kg/m     Body mass index is 29.84 kg/m .   NAD NCAT  Respiratory: breathing unlabored  Abdomen: s/nt/nd; inc c/d/i;     Hernia.              I reviewed the post-bariatric clinic questionnaire available in the epic system as a separate document.     Fluid intake: enough oz per day      Tolerating regular texture foods: no; TPN dependent.      Special testing:     Last CT scan from 2 months after emergency laparotomy surgery.    Orders Placed This Encounter   Procedures    CT Abdomen Pelvis w/o Contrast         PLAN  1. Will review at hernia conference.  CT ordered.  See orders from encounter.  Smoker; narcotic dependent.  2. F/u routine bariatric diet guidelines.  3. Focus on eating until hunger goes away rather than eating all the way to full.   3. Start with protein sources of food, move to " leafy vegetables next, and then eat solid fruits.   4. Follow-up: 6 months.       If you have any questions about our plans, please don't hesitate to contact me.         Francis Vyas MD  Surgery  [phone] (hospital )  [phone] (clinic nurses)

## 2025-01-07 ENCOUNTER — TRANSFERRED RECORDS (OUTPATIENT)
Dept: HEALTH INFORMATION MANAGEMENT | Facility: CLINIC | Age: 55
End: 2025-01-07
Payer: COMMERCIAL

## 2025-01-08 ENCOUNTER — MYC REFILL (OUTPATIENT)
Dept: PALLIATIVE MEDICINE | Facility: CLINIC | Age: 55
End: 2025-01-08
Payer: COMMERCIAL

## 2025-01-08 DIAGNOSIS — R10.9 CHRONIC ABDOMINAL PAIN: ICD-10-CM

## 2025-01-08 DIAGNOSIS — M54.50 CHRONIC BILATERAL LOW BACK PAIN WITHOUT SCIATICA: ICD-10-CM

## 2025-01-08 DIAGNOSIS — G89.29 CHRONIC BILATERAL LOW BACK PAIN WITHOUT SCIATICA: ICD-10-CM

## 2025-01-08 DIAGNOSIS — G89.4 CHRONIC PAIN SYNDROME: ICD-10-CM

## 2025-01-08 DIAGNOSIS — F11.90 CHRONIC, CONTINUOUS USE OF OPIOIDS: ICD-10-CM

## 2025-01-08 DIAGNOSIS — R19.8 VISCERAL HYPERALGESIA: ICD-10-CM

## 2025-01-08 DIAGNOSIS — G89.29 CHRONIC ABDOMINAL PAIN: ICD-10-CM

## 2025-01-08 RX ORDER — FENTANYL 25 UG/1
1 PATCH TRANSDERMAL
Qty: 15 PATCH | Refills: 0 | Status: SHIPPED | OUTPATIENT
Start: 2025-01-08

## 2025-01-08 RX ORDER — OXYCODONE HCL 5 MG/5 ML
SOLUTION, ORAL ORAL
Qty: 1350 ML | Refills: 0 | Status: SHIPPED | OUTPATIENT
Start: 2025-01-08

## 2025-01-08 NOTE — TELEPHONE ENCOUNTER
Refills have been requested for the following medications:         fentaNYL (DURAGESIC) 25 mcg/hr 72 hr patch [Natalie Hull]         oxyCODONE (ROXICODONE) 5 MG/5ML solution [Natalie Hull]     Preferred pharmacy: [facility] [place], [place] - [address] [place] [address]

## 2025-01-08 NOTE — TELEPHONE ENCOUNTER
Medication refill information reviewed.     Due date for FENTANYL 25 MCG/HR TD PT72    OXYCODONE HCL 5 MG/5ML PO SOLN is 1/22/2025 for both     Prescriptions prepped for review.     Will route to provider.

## 2025-01-08 NOTE — TELEPHONE ENCOUNTER
Received call from patient requesting refill(s) of   FENTANYL 25 MCG/HR TD PT72  Dec. 20, 2024     OXYCODONE HCL 5 MG/5ML PO SOLN  Dec. 20, 2024         Patient's last office/virtual visit by prescribing provider on: Dec. 17, 2024   Next office/virtual appointment scheduled for: Mar. 18, 2025       UDT: Oct. 14, 2024   CSA: Oct. 16, 2024     [place]    [facility] [place] [address]      Will route to nursing [place] for review and preparation of prescription(s).

## 2025-01-08 NOTE — TELEPHONE ENCOUNTER
Signed Prescriptions:                        Disp   Refills    fentaNYL (DURAGESIC) 25 mcg/hr 72 hr patch 15 pat*0        Sig: Place 1 patch onto the skin every 48 hours. 30 day           supply for chronic pain. OK to fill 1/20/2025 and           start 1/22/2025.  Authorizing Provider: NATALIE MCDOWELL    oxyCODONE (ROXICODONE) 5 MG/5ML solution   1350 mL0        Sig: Take 15 mLs (15 mg) by mouth at bedtime. May also           take 10 mLs (10 mg) every 4 hours as needed for           moderate to severe pain. Max of 45mg/day. 30 day           supply for chronic pain. OK to fill 1/20/2025 and           start 1/22/2025..  Authorizing Provider: NATALIE MCDOWELL        Reviewed [place]  January 8, 2025- no concerning fills.    Natalie MENARD, RN CNP, FNP  [facility]  [facility]

## 2025-01-09 NOTE — TELEPHONE ENCOUNTER
[facility] Call Center    Phone Message    May a detailed message be left on voicemail: yes    Reason for Call: Other: [facility] is requesting orders for Heparin.      Action Taken: Message routed to:  [facility] Bar    
Per Dr. Vyas he is ok with them doing Heparin per [facility] protocol. Called nuria at  with order.  
No

## 2025-01-14 ENCOUNTER — TRANSFERRED RECORDS (OUTPATIENT)
Dept: HEALTH INFORMATION MANAGEMENT | Facility: CLINIC | Age: 55
End: 2025-01-14
Payer: COMMERCIAL

## 2025-01-21 ENCOUNTER — TRANSFERRED RECORDS (OUTPATIENT)
Dept: HEALTH INFORMATION MANAGEMENT | Facility: CLINIC | Age: 55
End: 2025-01-21
Payer: COMMERCIAL

## 2025-01-27 ENCOUNTER — MYC REFILL (OUTPATIENT)
Dept: INTERNAL MEDICINE | Facility: CLINIC | Age: 55
End: 2025-01-27
Payer: COMMERCIAL

## 2025-01-27 DIAGNOSIS — I82.90 VTE (VENOUS THROMBOEMBOLISM): ICD-10-CM

## 2025-01-27 DIAGNOSIS — E44.0 MODERATE PROTEIN-CALORIE MALNUTRITION: Primary | ICD-10-CM

## 2025-01-27 DIAGNOSIS — Z98.84 S/P BARIATRIC SURGERY: ICD-10-CM

## 2025-01-27 RX ORDER — NYSTATIN 100000 U/G
CREAM TOPICAL DAILY PRN
Qty: 30 G | Refills: 3 | Status: SHIPPED | OUTPATIENT
Start: 2025-01-27

## 2025-01-27 RX ORDER — ENOXAPARIN SODIUM 100 MG/ML
INJECTION SUBCUTANEOUS
Qty: 67.2 ML | Refills: 0 | Status: SHIPPED | OUTPATIENT
Start: 2025-01-27

## 2025-02-03 ENCOUNTER — MYC REFILL (OUTPATIENT)
Dept: PALLIATIVE MEDICINE | Facility: CLINIC | Age: 55
End: 2025-02-03
Payer: COMMERCIAL

## 2025-02-03 DIAGNOSIS — M54.50 CHRONIC BILATERAL LOW BACK PAIN WITHOUT SCIATICA: ICD-10-CM

## 2025-02-03 DIAGNOSIS — R19.8 VISCERAL HYPERALGESIA: ICD-10-CM

## 2025-02-03 DIAGNOSIS — G89.29 CHRONIC ABDOMINAL PAIN: ICD-10-CM

## 2025-02-03 DIAGNOSIS — F11.90 CHRONIC, CONTINUOUS USE OF OPIOIDS: ICD-10-CM

## 2025-02-03 DIAGNOSIS — G89.4 CHRONIC PAIN SYNDROME: ICD-10-CM

## 2025-02-03 DIAGNOSIS — G89.29 CHRONIC BILATERAL LOW BACK PAIN WITHOUT SCIATICA: ICD-10-CM

## 2025-02-03 DIAGNOSIS — R10.9 CHRONIC ABDOMINAL PAIN: ICD-10-CM

## 2025-02-03 RX ORDER — FENTANYL 25 UG/1
1 PATCH TRANSDERMAL
Qty: 15 PATCH | Refills: 0 | Status: SHIPPED | OUTPATIENT
Start: 2025-02-03

## 2025-02-03 RX ORDER — OXYCODONE HCL 5 MG/5 ML
SOLUTION, ORAL ORAL
Qty: 1350 ML | Refills: 0 | Status: SHIPPED | OUTPATIENT
Start: 2025-02-03

## 2025-02-03 NOTE — TELEPHONE ENCOUNTER
Refills have been requested for the following medications:         fentaNYL (DURAGESIC) 25 mcg/hr 72 hr patch [Natalie Hull]         oxyCODONE (ROXICODONE) 5 MG/5ML solution [Natalie Hull]     Preferred pharmacy: [facility] [place], [place] - [address] [place] [address]

## 2025-02-03 NOTE — TELEPHONE ENCOUNTER
Medication refill information reviewed.     Due date for  fentaNYL (DURAGESIC) 25 mcg/hr 72 hr patch  and oxyCODONE (ROXICODONE) 5 MG/5ML solution  is 02/21/25     Prescriptions prepped for review.     Will route to provider.

## 2025-02-03 NOTE — TELEPHONE ENCOUNTER
Signed Prescriptions:                        Disp   Refills    fentaNYL (DURAGESIC) 25 mcg/hr 72 hr patch 15 pat*0        Sig: Place 1 patch onto the skin every 48 hours. 30 day           supply for chronic pain. OK to fill 02/19/25 and           start 02/21/25.  Authorizing Provider: NATALIE MCDOWELL    oxyCODONE (ROXICODONE) 5 MG/5ML solution   1350 mL0        Sig: Take 15 mLs (15 mg) by mouth at bedtime. May also           take 10 mLs (10 mg) every 4 hours as needed for           moderate to severe pain. Max of 45mg/day. 30 day           supply for chronic pain. OK to fill 02/19/25 and           start 02/21/25..  Authorizing Provider: NATALIE MCDOWELL        Reviewed [place]  February 3, 2025- no concerning fills.    Natalie MENARD, RN CNP, FNP  [facility]  [facility]

## 2025-02-03 NOTE — TELEPHONE ENCOUNTER
Received request for a refill(s) of fentaNYL (DURAGESIC) 25 mcg/hr 72 hr patch    And  oxyCODONE (ROXICODONE) 5 MG/5ML solution     Last dispensed from pharmacy on 01/20/25 for both    Patient's last office/virtual visit by prescribing provider on 12/17/24  Next office/virtual appointment scheduled for 03/18/25    Last urine drug screen date 10/14/24  Current opioid agreement on file (completed within the last year) Yes Date of opioid agreement: 10/14/24    E-prescribe to pharmacy-[facility] [place] - [place], [place] - [address]     Will route to nursing pool for review and preparation of prescription(s).

## 2025-02-13 ENCOUNTER — PATIENT OUTREACH (OUTPATIENT)
Dept: CARE COORDINATION | Facility: CLINIC | Age: 55
End: 2025-02-13
Payer: COMMERCIAL

## 2025-02-13 DIAGNOSIS — E78.5 HYPERLIPIDEMIA LDL GOAL <130: Primary | ICD-10-CM

## 2025-02-13 NOTE — PROGRESS NOTES
Clinical Product Navigator RN reviewed chart; patient on payer product coverage.  Review results:   CPN Initial Information Gathering  Referral Source: Health Plan  Referrals Places: Care Coordination, MTM    Met referral criteria for Care Coordinator and MTM; referral to be sent.    Patient identified by their health plan for care coordination.  Referral Reasons:  Clinical Complexity: BH Condition - Alcohol/drug abuse or dependence;   Clinical Complexity: BH Condition (adult) - Schizophrenia, bipolar, eating disorder, depression or anxiety;   Clinical Complexity: IP Risk - Emerging: Moderate IP stay probability;   Lack of follow-up lab tests or procedures;   No follow-up after Acute event;   Other significant care gaps;   Missing office visit;   Opioid misuse or overdose;   SDOH - Diet, Exercise or other Lifestyle behaviors; Chronic condition and BH condition;   Recently Identified for Complex Care Management;   SDoH Access to care concerns or transportation insecurity  Pt(s) w/ COPD w/o an annual provider visit.   Complex drug regimens; Drug Safety Care Opportunities;     Melissa Behl BSN, RN, PHN, CCM  RN Clinical Product Navigator  [phone]

## 2025-02-14 ENCOUNTER — PATIENT OUTREACH (OUTPATIENT)
Dept: CARE COORDINATION | Facility: CLINIC | Age: 55
End: 2025-02-14
Payer: COMMERCIAL

## 2025-02-14 NOTE — LETTER
Dear Parker,    I am a clinic community health worker who works with Chuy Isaac MD with the [facility]. I have been trying to reach you recently to introduce Clinic Care Coordination. Below is a description of clinic care coordination and how I can further assist you.       The clinic care coordination team is made up of a registered nurse, , financial resource worker and community health worker who understand the health care system. The goal of clinic care coordination is to help you manage your health and improve access to the health care system. Our team works alongside your provider to assist you in determining your health and social needs. We can help you obtain health care and community resources, providing you with necessary information and education. We can work with you through any barriers and develop a care plan that helps coordinate and strengthen the communication between you and your care team.  Our services are voluntary and are offered without charge to you personally.    Please feel free to contact me with any questions or concerns regarding care coordination and what we can offer.      We are focused on providing you with the highest-quality healthcare experience possible.    Sincerely,     MAL Alvarez  [phone]  [facility]

## 2025-02-14 NOTE — PROGRESS NOTES
Clinic Care Coordination Contact  [facility]/Voicemail    Clinical Data: Care Coordinator Outreach    Outreach Documentation Number of Outreach Attempt   2/14/2025   8:56 AM 1       Left message on patient's voicemail with call back information and requested return call.      Plan: Care Coordinator will try to reach patient again in 1-2 business days.    MAL Alvarez  [phone]  [facility]

## 2025-02-17 NOTE — PROGRESS NOTES
Clinic Care Coordination Contact  [facility]/Voicemail    Clinical Data: Care Coordinator Outreach    Outreach Documentation Number of Outreach Attempt   2/14/2025   8:56 AM 1   2/17/2025   8:48 AM 2       Left message on patient's voicemail with call back information and requested return call.      Plan: Care Coordinator will send care coordination introduction letter with care coordinator contact information and explanation of care coordination services via [employer]. Care Coordinator will do no further outreaches at this time.    Carmencita Shane, MAL  [phone]  [facility]

## 2025-02-24 ENCOUNTER — MYC REFILL (OUTPATIENT)
Dept: INTERNAL MEDICINE | Facility: CLINIC | Age: 55
End: 2025-02-24
Payer: COMMERCIAL

## 2025-02-24 DIAGNOSIS — F41.9 CHRONIC ANXIETY: ICD-10-CM

## 2025-02-24 DIAGNOSIS — F90.0 ADHD (ATTENTION DEFICIT HYPERACTIVITY DISORDER), INATTENTIVE TYPE: ICD-10-CM

## 2025-02-24 RX ORDER — ALPRAZOLAM 0.5 MG
TABLET ORAL
Qty: 60 TABLET | Refills: 0 | Status: SHIPPED | OUTPATIENT
Start: 2025-02-24

## 2025-02-24 RX ORDER — DEXTROAMPHETAMINE SACCHARATE, AMPHETAMINE ASPARTATE, DEXTROAMPHETAMINE SULFATE AND AMPHETAMINE SULFATE 5; 5; 5; 5 MG/1; MG/1; MG/1; MG/1
TABLET ORAL
Qty: 30 TABLET | Refills: 0 | Status: SHIPPED | OUTPATIENT
Start: 2025-02-24

## 2025-02-27 ENCOUNTER — TELEPHONE (OUTPATIENT)
Dept: PHARMACY | Facility: CLINIC | Age: 55
End: 2025-02-27
Payer: COMMERCIAL

## 2025-02-27 NOTE — TELEPHONE ENCOUNTER
Called patient x2 for schedule MTM Phone visit. No answer x2. Left voicemail x2 with MTM Scheduling Line for patient to call to reschedule.     Bronson Acevedo, WaiD, [facility] Hospital[facility]  Medication Therapy Management Pharmacist  Voicemail: [phone]

## 2025-03-04 ENCOUNTER — TELEPHONE (OUTPATIENT)
Dept: INTERNAL MEDICINE | Facility: CLINIC | Age: 55
End: 2025-03-04
Payer: COMMERCIAL

## 2025-03-04 NOTE — TELEPHONE ENCOUNTER
Call attempt 1/3.    LVM for patient to schedule annual wellness exam due Now. On callback, please assist patient in scheduling Medicare AWV.    Please close encounter when scheduled.

## 2025-03-06 ENCOUNTER — MYC REFILL (OUTPATIENT)
Dept: INTERNAL MEDICINE | Facility: CLINIC | Age: 55
End: 2025-03-06
Payer: COMMERCIAL

## 2025-03-06 ENCOUNTER — MYC REFILL (OUTPATIENT)
Dept: PALLIATIVE MEDICINE | Facility: CLINIC | Age: 55
End: 2025-03-06
Payer: COMMERCIAL

## 2025-03-06 DIAGNOSIS — G89.4 CHRONIC PAIN SYNDROME: ICD-10-CM

## 2025-03-06 DIAGNOSIS — G89.29 CHRONIC BILATERAL LOW BACK PAIN WITHOUT SCIATICA: ICD-10-CM

## 2025-03-06 DIAGNOSIS — F11.90 CHRONIC, CONTINUOUS USE OF OPIOIDS: ICD-10-CM

## 2025-03-06 DIAGNOSIS — M54.50 CHRONIC BILATERAL LOW BACK PAIN WITHOUT SCIATICA: ICD-10-CM

## 2025-03-06 DIAGNOSIS — R11.0 NAUSEA: ICD-10-CM

## 2025-03-06 DIAGNOSIS — R19.8 VISCERAL HYPERALGESIA: ICD-10-CM

## 2025-03-06 DIAGNOSIS — G89.29 CHRONIC ABDOMINAL PAIN: ICD-10-CM

## 2025-03-06 DIAGNOSIS — R10.9 CHRONIC ABDOMINAL PAIN: ICD-10-CM

## 2025-03-06 RX ORDER — ONDANSETRON 8 MG/1
TABLET, ORALLY DISINTEGRATING ORAL
Qty: 90 TABLET | Refills: 1 | Status: SHIPPED | OUTPATIENT
Start: 2025-03-06

## 2025-03-06 NOTE — TELEPHONE ENCOUNTER
Medication refill information reviewed.     Due date for     oxyCODONE (ROXICODONE) 5 MG/5ML solution and fentaNYL (DURAGESIC) 25 mcg/hr 72 hr patch  is 03/23/25     Prescriptions prepped for review.     Will route to provider.

## 2025-03-06 NOTE — TELEPHONE ENCOUNTER
Problem: Discharge Planning  Goal: Discharge to home or other facility with appropriate resources  Outcome: Progressing     Problem: Pain  Goal: Verbalizes/displays adequate comfort level or baseline comfort level  Outcome: Progressing     Problem: Safety - Adult  Goal: Free from fall injury  Outcome: Progressing     Problem: ABCDS Injury Assessment  Goal: Absence of physical injury  Outcome: Progressing Refills have been requested for the following medications:         fentaNYL (DURAGESIC) 25 mcg/hr 72 hr patch [Natalie Hull]         oxyCODONE (ROXICODONE) 5 MG/5ML solution [Natalie Hull]     Preferred pharmacy: [facility] [place], [place] - [address] [place] [address]

## 2025-03-06 NOTE — TELEPHONE ENCOUNTER
Received request for a refill(s) of   oxyCODONE (ROXICODONE) 5 MG/5ML solution  fentaNYL (DURAGESIC) 25 mcg/hr 72 hr patch     Last dispensed from pharmacy on     oxyCODONE (ROXICODONE) 5 MG/5ML solution - 2/19/2025  fentaNYL (DURAGESIC) 25 mcg/hr 72 hr patch - 2/19/2025    Patient's last office/virtual visit by prescribing provider on 10/14/2024  Next office/virtual appointment scheduled for 3/18/2025    Last urine drug screen date 10/14/2024  Current opioid agreement on file (completed within the last year) Yes Date of opioid agreement: 10/16/2024    [place] [facility] [place] - [address]   pharmacy    Will route to nursing [place] for review and preparation of prescription(s).

## 2025-03-11 RX ORDER — OXYCODONE HCL 5 MG/5 ML
SOLUTION, ORAL ORAL
Qty: 1350 ML | Refills: 0 | Status: SHIPPED | OUTPATIENT
Start: 2025-03-11

## 2025-03-11 RX ORDER — FENTANYL 25 UG/1
1 PATCH TRANSDERMAL
Qty: 15 PATCH | Refills: 0 | Status: SHIPPED | OUTPATIENT
Start: 2025-03-11

## 2025-03-11 NOTE — TELEPHONE ENCOUNTER
Signed Prescriptions:                        Disp   Refills    fentaNYL (DURAGESIC) 25 mcg/hr 72 hr patch 15 pat*0        Sig: Place 1 patch onto the skin every 48 hours. 30 day           supply for chronic pain. OK to fill 03/21/25 and           start 03/23/25.  Authorizing Provider: NATALIE MCDOWELL    oxyCODONE (ROXICODONE) 5 MG/5ML solution   1350 mL0        Sig: Take 15 mLs (15 mg) by mouth at bedtime. May also           take 10 mLs (10 mg) every 4 hours as needed for           moderate to severe pain. Max of 45 mg/day. 30 day           supply for chronic pain. OK to fill 03/21/25 and           start 03/23/25..  Authorizing Provider: NATALIE MCDOWELL        Reviewed [place]  March 11, 2025- no concerning fills.    Natalie MENARD, RN CNP, FNP  [facility]  [facility]

## 2025-03-18 ENCOUNTER — TELEPHONE (OUTPATIENT)
Dept: PALLIATIVE MEDICINE | Facility: CLINIC | Age: 55
End: 2025-03-18

## 2025-03-18 ENCOUNTER — ENROLLMENT (OUTPATIENT)
Dept: HOME HEALTH SERVICES | Facility: HOME HEALTH | Age: 55
End: 2025-03-18
Payer: COMMERCIAL

## 2025-03-18 ENCOUNTER — VIRTUAL VISIT (OUTPATIENT)
Dept: PALLIATIVE MEDICINE | Facility: CLINIC | Age: 55
End: 2025-03-18
Attending: NURSE PRACTITIONER
Payer: COMMERCIAL

## 2025-03-18 DIAGNOSIS — R10.9 CHRONIC ABDOMINAL PAIN: ICD-10-CM

## 2025-03-18 DIAGNOSIS — G89.29 CHRONIC BILATERAL LOW BACK PAIN WITHOUT SCIATICA: ICD-10-CM

## 2025-03-18 DIAGNOSIS — G89.4 CHRONIC PAIN SYNDROME: ICD-10-CM

## 2025-03-18 DIAGNOSIS — E44.0 MODERATE PROTEIN-CALORIE MALNUTRITION: ICD-10-CM

## 2025-03-18 DIAGNOSIS — K56.1 JEJUNAL INTUSSUSCEPTION (H): ICD-10-CM

## 2025-03-18 DIAGNOSIS — R19.8 VISCERAL HYPERALGESIA: Primary | ICD-10-CM

## 2025-03-18 DIAGNOSIS — F11.90 CHRONIC, CONTINUOUS USE OF OPIOIDS: ICD-10-CM

## 2025-03-18 DIAGNOSIS — R13.10 DYSPHAGIA, UNSPECIFIED TYPE: Primary | ICD-10-CM

## 2025-03-18 DIAGNOSIS — Z98.84 S/P BARIATRIC SURGERY: ICD-10-CM

## 2025-03-18 DIAGNOSIS — G89.29 CHRONIC ABDOMINAL PAIN: ICD-10-CM

## 2025-03-18 DIAGNOSIS — M54.50 CHRONIC BILATERAL LOW BACK PAIN WITHOUT SCIATICA: ICD-10-CM

## 2025-03-18 RX ORDER — PREGABALIN 25 MG/1
25 CAPSULE ORAL AT BEDTIME
Qty: 90 CAPSULE | Refills: 1 | Status: SHIPPED | OUTPATIENT
Start: 2025-03-18

## 2025-03-18 ASSESSMENT — PAIN SCALES - GENERAL: PAINLEVEL_OUTOF10: MODERATE PAIN (5)

## 2025-03-18 NOTE — PATIENT INSTRUCTIONS
Plan:   Physical Therapy: none  Clinical Health Psychologist to address issues of relaxation, behavioral change, coping style, and other factors important to improvement: none  Continue gentle movement at home  Diagnostic Studies: none  Medication Management:   Continue fentanyl patch 25mcg/hr every 48 hours, fill 3/21 and start 3/23    Oxycodone liquid 5mg/5ml, take 15mg at bedtime (15m.) and may take 10mg (10ml) every 4 hours max of 3 doses per day of 10mg. Max of 45mg (45ml) per day.  Fill 3/21 and start 3/23  Restart pregabalin 25mg at bedtime  Reminded not to drink alcohol while on opiate meds   Further procedures recommended: none  Recommendations/follow-up for PCP:  See above  Release of information: none  Follow up: 3 months can be in-person visit          Clinic Number:  [phone]   Call with any questions about your care and for scheduling assistance.   Calls are returned Monday through Friday between 8 AM and 4:30 PM. We usually get back to you within 2 business days depending on the issue/request.    If we are prescribing your medications:  For opioid medication refills, call the clinic or send a [facility] message 7 days in advance.  Please include:  Name of requested medication  Name of the pharmacy.  For non-opioid medications, call your pharmacy directly to request a refill. Please allow 3-4 days to be processed.   Per [place] State Law:  All controlled substance prescriptions must be filled within 30 days of being written.    For those controlled substances allowing refills, pickup must occur within 30 days of last fill.      We believe regular attendance is key to your success in our program!    Any time you are unable to keep your appointment we ask that you call us at least 24 hours in advance to cancel.This will allow us to offer the appointment time to another patient.   Multiple missed appointments may lead to dismissal from the clinic.

## 2025-03-18 NOTE — PROGRESS NOTES
Parker is a 54 year old who is being evaluated via a billable telephone visit.    What phone number would you like to be contacted at? [phone]  How would you like to obtain your AVS? Chris  Originating Location (pt. Location): Home    Distant Location (provider location):  On-site  Telephone visit completed due to the patient did not have access to video, while the distant provider did.  Phone start: 1352  Phone end: 0414  Phone call duration: 10 minutes     Is Pt currently in [place]? Yes    NOTE:  If Pt is not in [place], Appointment needs to be canceled and rescheduled.       [facility]      3/18/2025    Chief complaint:   -Ongoing abdominal pain  -also has a large abdominal hernia--this may need to be surgically repaired        Interval history:  Parker Acevedo is a 54 year old male is known to me for:  Visceral hyperalgesia  Chronic abdominal pain  Chronic pain syndrome  PMHx includes: ADHD, anxiety, cardiomyopathy and nutritional diseases, chronic abdominal pain, central line associated bloodstream infection recurrent, anesthesia complication, difficulty swallowing, gastric ulcer unspecified as acute or chronic without mention of hemorrhage perforation or obstruction, gastroesophageal reflux disease, head injury, hiatal hernia, other bladder disorder, other chronic pain, postop nausea and vomiting, severe malnutrition on TPN, short gut syndrome, tobacco abuse  PSHx includes: Appendectomy, back surgery (11/3/2014), biopsy of cervical lymph node (2/20/2015), cholecystectomy, colonoscopy (2021, 2022), cervical anterior 1 level discectomy and fusion (2/15/2017), endoscopic insertion tube gastrostomy (9/9/2013), endoscopic ultrasound upper gastrointestinal tract (4/29/2011), endoscopic ultrasound upper gastrointestinal tract (9/9/2013), endoscopic ultrasound upper gastrointestinal tract (2/24/2021), endoscopy (3/25/2011, 8/4/2009, 1/5/2009), multiple EGDs, gastrectomy (6/22/2012),  gastrojejunostomy (8/26/2009), umbilical hernia repair (2006), hernia raphe hiatal (6/22/2012), herniorrhaphy inguinal (11/22/2013), insert PICC line on the right (12/19/2019), insert PICC line right (2/21/2020), insert vascular access port on the right (12/19/2017, 8/2/2018), multiple interventional radiology CVC tunnel placement and removals, exploratory laparotomy (11/22/2013), orthopedic surgery, Celestino-en-Y gastric bypass (2003), tonsillectomy.        Recommendations/plan at the last visit on 12/17/2024 included:  Physical Therapy: none  Clinical Health Psychologist to address issues of relaxation, behavioral change, coping style, and other factors important to improvement: none  Continue gentle movement at home  Diagnostic Studies: none  Medication Management:   Continue fentanyl patch 25mcg/hr every 48 hours, fill 12/20 and start 12/23    Oxycodone liquid 5mg/5ml, take 15mg at bedtime (15m.) and may take 10mg (10ml) every 4 hours max of 3 doses per day of 10mg. Max of 45mg (45ml) per day.  Fill 12/20 and start 12/23  Reminded not to drink alcohol while on opiate meds   Further procedures recommended: none  Recommendations/follow-up for PCP:  See above  Release of information: none  Follow up: 3 months can be in-person or virtual        Since his last visit, Parker LAL Curtis reports:    Interval history March 18, 2025  -has large abdominal hernia, getting larger, Dr. Vyas will  be having a surgical conference re: possible future surgery for Parker  -he has ongoing abdominal pain and visceral hyperalgesia from multiple previous surgical abdominal procedures with previous complex closure.   -he is interested in adding in a low dose of pregabalin again at bedtime for improved pain control.   -he states that last month's oxycodone solution was  not as good as it typically is,  had changed.         Interval history December 17, 2024  -overall, is doing okay.   -umbilical hernia is getting larger, seen by  Dr. Isaac recently and they recommended he see his abdominal surgeon again.   -ongoing abdominal pain  and visceral hyperalgesia from multiple previous surgical abdominal procedures with complex closure.   -notes his liver enzymes are slightly elevated. These are being followed by his Primary Care Provider   -not sleeping well, asks about what I think about increasing his alprazolam. Discussed that I recommend against increasing benzodiazepines while on opiates as doing so increases risks for opiate overdose and death   -discussed possible use of hydroxyzine for improved sleep, patient prefers not to add to medication list  -discussed trial of herbal tea or hot milk at bedtime, he will try this.       Interval history 2024  -did not like medical cannabis, made him have side effects  Will be needing repeat surgery for huge umbilical hernia, he could not tolerate the girdle Dr. Vyas gave him, made his feeding tube leak  Admits he spilled about 200ml of the oxycodone liquid, he has reduced his use to stretch his supply as he knows that insurance won't cover any additional.   -leaving this Friday to be out of town for a week for a , requests early refill, will not start his meds until 10/24.  He requests to be able to use 5mg more per day, he desires to use 15mg at bedtime as he has increased abdominal pain when he does his overnight feedings.     Interval history 2024  -he is getting settled in his new home.   -has a new abdominal hernia in the last month from carrying large boxes during the move.   -ongoing abdominal pain, he feels that this pain is worse due to the hernia.  -he is still on IV antibiotics given to him by home health  -he tried medical cannabis, he did not like how he felt on cannabis  -he had stopped taking Lyrica a while ago. He did find it helpful. Will re-start this for improvement of neuropathic portion of pain    Interval history 2024  -chronic neuropathic  "abdominal pain remains.   -he states his pain is worse right now.   -they are in the middle of moving and significantly downsizing. This has been quite stressful, so he believes the increase in pain is stress related.   -stable on current medication regimen     Pain history collected at initial visit on 5/27/2022  He had gastric bypass in 2003 and about 5 years after that, he developed gastric ulcers. He ended up having surgery for gastric ulcer and hernias and such. He has had over 11 surgeries for his abdomen. He is malnourished due to short-gut syndrome. He has a J-tube that is open to vent bile from his stomach as he gets bloated.   Worst pain is inside the abdominal cavity. He will have pain so bad that he states he screams for his mother. He feels that this is a deep inside pain.   -he would like to increase his oxycodone dose by one dose per day. His activity is limited by not having medication to cover him for his daily activities.      -he has a DVT in his right arm and in his right jugular vein. He is on lovenox.         At this point, the patient's participation with our multidisciplinary team includes:  The patient has been compliant with the program.  PT - none  Health Psych - none      Pain scores:   Pain right now is 5/10.   Pain average score is 5-6/10.   Pain range is 3-10/10. His pain will pop up but improves over about a half-hour after taking his medication.   Pain is described as \"sharp, burning, agonizing and like on fire, has some dull right and Left UQ.\"  Pain is constant in nature    Current pain relevant medications:   -tylenol 32mg/ml liquid take 15.65mls (500mg) Q 4 hours PRN fever/mild pain  (somewhat helpful for headaches)  --Duragesic 25mcg/hr Q 48 hours (helpful)  -lidoderm 5% patch PRN (helpful)  -Narcan nasal spray for opiate reversal   -Oxycodone 5mg/5ml solution take 5-10mls (5-10mg) TID PRN max of 40mg/day with 4 hours between doses.  (helpful)     Other pertinent " medications:  -Adderall 20mg every day  -LOVENOX 120mg Subcutaneously every day  -lorazepam 1mg for anxiety with TPN and meds once per day (uses most nights)  -Zofran 8mg Q 8 hours PRN     Previous medication treatments included:  OPIATES: Fentanyl (helpful), morphine (hallucinations), Dilaudid (not helpful, did not dissolve), Tylenol #3 (not helpful), hydrocodone (not helpful), Belbuca (not helpful--he had a really heavy chest feeling)  NSAIDS: cannot take due to severe gastric ulcer disease  MUSCLE RELAXANTS: none  ANTI-MIGRAINE MEDS: none  ANTI-DEPRESSANTS: none  SLEEP AIDS: benadryl (helpful)  ANTI-CONVULSANTS: gabapentin (bad headaches and acid reflux),  TOPICALS: Lidocaine patches (helpful)  ANXIOLYTICS: valium (helpful 5mg dosage), lorazepam (helpful)  MEDICAL CANNABIS: not interested  Other meds: tylenol (helpful for headaches)        Other treatments have included:  Parker Acevedo has been seen at a pain clinic in the past.  Previously managed here by Dr. Jessica Milligan. Also seen by [facility] for possible implanted intrathecal pain pump, he is not candidate due to infection risk.   PT: tried, never helped his neck or abdominal surgery  Massage Therapy: helpful for a day or two  Chiropractic care: tried, helped some   Acupuncture: tried, not helpful  TENs Unit: tried, not helpful  Healing Touch: not helpful     Injections:   -previously had injections for his neck with Dr. Jessica Milligan      Surgeries:  9/30/2023 exploratory laparotomy, reduction of intussusception, resection of small bowel with primary anastamosis, and upper endoscopy with Dr. Mercado (helpful)      THE 4 A's OF OPIOID MAINTENANCE ANALGESIA    Analgesia: keeps pain pretty manageable    Activity: he walks daily, light housekeeping    Adverse effects: none    Adherence to Rx protocol: yes      Side Effects: no side effect  Patient is using the medication as prescribed: YES    Medications:  Current Outpatient Medications   Medication Sig  Dispense Refill    albuterol (VENTOLIN HFA) 108 (90 Base) MCG/ACT inhaler Inhale 2 puffs into the lungs every 6 hours as needed. 18 g 1    ALPRAZolam (XANAX) 0.5 MG tablet TAKE ONE TABLET BY MOUTH TWICE A DAY AS NEEDED FOR SLEEP OR FOR ANXIETY 60 tablet 0    amphetamine-dextroamphetamine (ADDERALL) 20 MG tablet TAKE ONE TABLET BY MOUTH ONCE DAILY 30 tablet 0    carvedilol (COREG) 6.25 MG tablet TAKE 2 TABLETS (12.5 MG) BY MOUTH 2 TIMES DAILY WITH MEALS 360 tablet 0    cyanocobalamin (CYANOCOBALAMIN) 1000 mcg/mL injection Inject 1 mL (1,000 mcg) into the muscle every 30 days. 3 mL 0    enoxaparin ANTICOAGULANT (LOVENOX) 80 MG/0.8ML syringe INJECT THE CONTENTS OF ONE SYRINGE (80MG) UNDER THE SKIN TWO TIMES A DAY 67.2 mL 0    fentaNYL (DURAGESIC) 25 mcg/hr 72 hr patch Place 1 patch onto the skin every 48 hours. 30 day supply for chronic pain. OK to fill 03/21/25 and start 03/23/25. 15 patch 0    lipids plant base (CLINOLIPID) 20 % infusion Inject 250 mLs into the vein every 24 hours 1000 mL 3    naloxone (NARCAN) 4 MG/0.1ML nasal spray Spray 1 spray (4 mg) into one nostril alternating nostrils as needed for opioid reversal. every 2-3 minutes until assistance arrives 0.2 mL 3    nystatin (MYCOSTATIN) 682726 UNIT/GM external cream Apply topically daily as needed for other (around area of J tube). 30 g 3    ondansetron (ZOFRAN ODT) 8 MG ODT tab DISSOLVE ONE TABLET BY MOUTH EVERY 8 HOURS AS NEEDED FOR NAUSEA 90 tablet 1    oxyCODONE (ROXICODONE) 5 MG/5ML solution Take 15 mLs (15 mg) by mouth at bedtime. May also take 10 mLs (10 mg) every 4 hours as needed for moderate to severe pain. Max of 45 mg/day. 30 day supply for chronic pain. OK to fill 03/21/25 and start 03/23/25.. 1350 mL 0    parenteral nutrition - PTA/DISCHARGE ORDER Infuse daily. Managed by [employer] [phone]  Total volume: 1260 mL  Cycled over 12 hr  Clinisol 15% 100 gram/day  Dextrose 70% 175 gram/day  Na Acetate 35 mEq/day  K Chloride 85  "mEq/day  Mg sulfate 6 mEq/day  Ca gluconate 25 mEq/day  MVI 10 mL/day  Trace 1 mL/day  Zinc 5 mg /day  Famotidine 20 mg/day  Cl:Ace=1: 2.4  Intralipid 20% 50 gram/day ([facility] only)      polyethylene glycol (MIRALAX) 17 GM/Dose powder Take 17 g by mouth as needed for constipation      sucralfate (CARAFATE) 1 GM/10ML suspension Take 10 mLs (1 g) by mouth 4 times daily as needed for nausea. 414 mL 1    Syringe/Needle, Disp, (B-D ECLIPSE SYRINGE) 27G X 1/2\" 1 ML MISC 1 Device every 30 days. 30 each 1    vitamin D2 (ERGOCALCIFEROL) 52670 units (1250 mcg) capsule Take 1 capsule (50,000 Units) by mouth once a week. 12 capsule 3       Medical History: any changes in medical history since they were last seen? No    Social History:   Home situation:  to Vandana, one son in late 20's   Occupation/Schooling: he used to work at [employer]. He is on disability, SSDI  Tobacco use: smokes 1/2 ppd,  Alcohol use: none  Drug use: none  History of chemical dependency treatment: none        Physical Exam:  Vital signs: There were no vitals taken for this visit.    Behavioral observations:  Awake, alert. Cooperative.   Pulm: respirations easy and unlabored. Able to speak in full sentences without SOB or cough noted.            Diagnostic tests:  CT ABDOMEN PELVIS W CONTRAST  2/16/2019 3:00 AM      HISTORY: Right-sided abdominal pain, status post gastric bypass.  Patient has G-tube with blood and history of ulcers.     TECHNIQUE: CT abdomen and pelvis with 85 mL Isovue-370 IV. Radiation  dose for this scan was reduced using automated exposure control,  adjustment of the mA and/or kV according to patient size, or iterative  reconstruction technique.     COMPARISON: 1/13/2015.     FINDINGS:  Abdomen: There is mild dependent atelectasis at the lung bases. The  heart size is normal. Small hiatal hernia. There is mild biliary  dilatation into the pancreas head which is probably normal post  cholecystectomy. The liver otherwise " appears normal. The gallbladder  is absent. The spleen, pancreas, adrenal glands and kidneys are normal  in appearance. There is a G-tube in place. No abdominal or pelvic  lymph node enlargement.     Pelvis: The ascending colon and transverse colon are very distended  with gas, stool and fluid. The descending and rectosigmoid colon are  nondilated. Transition point is in the region of the splenic flexure,  but there is no convincing obstruction. Small bowel is normal in  caliber. The appendix is normal. There is no free intraperitoneal gas  or fluid.                                                                      IMPRESSION:  1. The ascending and transverse colon are distended with gas, stool  and fluid. Distal colon is nondilated. No focal obstruction is  evident.  2. Small hiatal hernia.     IMANI HU MD        MR CERVICAL SPINE WITHOUT CONTRAST  1/2/2017  2:44 PM     HISTORY: Injury to neck 2 weeks ago with development of suspected  radicular pain to the left upper extremity with weakness of thumb and  index finger.     COMPARISON: Plain films 12/22/2016.     TECHNIQUE: Routine MR cervical spine extended through T2.     FINDINGS: Vertebral body bone marrow signal is normal and the  alignment is normal through T2. No malignant or destructive changes.  The cervical and upper thoracic spinal cord appear intrinsically  normal through T2. Craniocervical junction region is normal. No  paraspinous soft tissue abnormality.     Findings by level as follows:     C2-C3: Negative. No disc protrusion. No central or lateral stenosis.     C3-C4: Negative.     C4-C5: Very tiny central disc protrusion. No significant central or  lateral stenosis.     C5-C6: Moderate degenerative narrowing of the interspace. Mild  broad-based disc osteophyte complex and small uncinate spurs. Minimal  central stenosis and minimal bilateral foraminal stenosis.     C6-C7: Moderate degenerative narrowing of the interspace. No  disc  protrusion. No central or lateral stenosis.     C7-T1: Negative.                                                                      IMPRESSION:  1. Degenerative changes as described, most marked at C5-C6 and C6-C7.  2. No intrinsic cord abnormality through T2.     MARTHA MCCARTY MD           MR LUMBAR SPINE W/O & W CONTRAST 1/6/2019 3:49 PM     Provided History: Back pain, > 6wks conservative tx, persistent sx;  pain in the left paraspinal region- now with fungemia, persistent  blood stream infections since Oct 2018.     Comparison: No similar studies     Technique: Sagittal T1-weighted and T2-weighted and axial T2-weighted  images of the lumbar spine were obtained without intravenous contrast.  Post intravenous contrast using gadolinium axial and sagittal  T1-weighted images were obtained with fat saturation.     Contrast: 8.9CC GADAVIST     Findings: Regarding numbering convention, there are 5 lumbar-type  vertebrae assumed for the purposes of this dictation.  The tip of the  conus medullaris is at L1.  Regarding alignment, the lumbar vertebral  column appears normally aligned.  There is no significant disc height  narrowing at any level.  Regarding bone marrow signal intensity, no  abnormality is visualized on STIR images.     On a level by level basis:     L1-2: No significant spinal canal or foraminal narrowing.     L2-3: Minimal disc bulge. Mild thickening of the ligamentum flavum. No  significant spinal canal or foraminal narrowing.     L3-4: Mild disc bulge and thickening of the ligamentum flavum with  mild spinal canal narrowing. No neural foraminal narrowing.     L4-5: Mild disc bulge and thickening of the ligamentum flavum. No  central spinal canal narrowing or neuroforaminal stenosis.      L5-S1: No significant spinal canal or foraminal narrowing.     3 bandlike areas of high STIR signal and enhancement in the right  posterior paraspinous muscles.                                                                       Impression:  1. No abnormal signal within the spine to suggest fungal infection.  Mild nonspecific enhancement and STIR hyperintensity in the right  posterior paraspinous muscles, potentially myositis.  2. Multilevel lumbar spondylosis.     I have personally reviewed the examination and initial interpretation  and I agree with the findings.     YOLA TELLEZ MD           Other testing (labs, diagnostics):  1/10/2025  Cr. 0.64  Est         Screening tools:      DIRE Score for ongoing opioid management is calculated as follows:     Diagnosis = 2     Intractability = 2     Risk: Psych = 3  Chem Hlth = 2  Reliability = 2  Social = 2     Efficacy = 2     Total DIRE Score = 15 (14 or higher predicts good candidate for ongoing opioid management; 13 or lower predicts poor candidate for opioid management)         [place] Prescription Monitoring Program:  [place]ed [place]  3/18/2025- no concerning fills.  Natalie MENARD, RN CNP, FNP  [facility]  [place] location          Assessment:   Visceral hyperalgesia  Chronic abdominal pain  Jejunal intusseption  Chronic bilateral low back pain without sciatica  Chronic pain syndrome  Chronic continuous use of opioids    Encounter for long term use of opiate analgesic (current use)  CSA signed 10/14/2024  UDT 10/14/2024 wearing Fentanyl and oxycodone at 2pm  Long term current use of opiate analgesic  PMHx includes: ADHD, anxiety, cardiomyopathy and nutritional diseases, chronic abdominal pain, central line associated bloodstream infection recurrent, anesthesia complication, difficulty swallowing, gastric ulcer unspecified as acute or chronic without mention of hemorrhage perforation or obstruction, gastroesophageal reflux disease, head injury, hiatal hernia, other bladder disorder, other chronic pain, postop nausea and vomiting, severe malnutrition on TPN, short gut syndrome, tobacco abuse  PSHx includes: Appendectomy, back  surgery (11/3/2014), biopsy of cervical lymph node (2/20/2015), cholecystectomy, colonoscopy (2021, 2022), cervical anterior 1 level discectomy and fusion (2/15/2017), endoscopic insertion tube gastrostomy (9/9/2013), endoscopic ultrasound upper gastrointestinal tract (4/29/2011), endoscopic ultrasound upper gastrointestinal tract (9/9/2013), endoscopic ultrasound upper gastrointestinal tract (2/24/2021), endoscopy (3/25/2011, 8/4/2009, 1/5/2009), multiple EGDs, gastrectomy (6/22/2012), gastrojejunostomy (8/26/2009), umbilical hernia repair (2006), hernia raphe hiatal (6/22/2012), herniorrhaphy inguinal (11/22/2013), insert PICC line on the right (12/19/2019), insert PICC line right (2/21/2020), insert vascular access port on the right (12/19/2017, 8/2/2018), multiple interventional radiology CVC tunnel placement and removals, exploratory laparotomy (11/22/2013), orthopedic surgery, Celestino-en-Y gastric bypass (2003), tonsillectomy.        Plan:   Physical Therapy: none  Clinical Health Psychologist to address issues of relaxation, behavioral change, coping style, and other factors important to improvement: none  Continue gentle movement at home  Diagnostic Studies: none  Medication Management:   Continue fentanyl patch 25mcg/hr every 48 hours, fill 3/21 and start 3/23    Oxycodone liquid 5mg/5ml, take 15mg at bedtime (15m.) and may take 10mg (10ml) every 4 hours max of 3 doses per day of 10mg. Max of 45mg (45ml) per day.  Fill 3/21 and start 3/23  Restart pregabalin 25mg at bedtime  Reminded not to drink alcohol while on opiate meds   Further procedures recommended: none  Recommendations/follow-up for PCP:  See above  Release of information: none  Follow up: 3 months can be in-person visit    ASSESSMENT AND PLAN:  (R19.8) Visceral hyperalgesia  (primary encounter diagnosis)  Comment:   Plan: pregabalin (LYRICA) 25 MG capsule, Adult Pain         Clinic Follow-Up Order            (R10.9,  G89.29) Chronic abdominal  pain  Comment:   Plan: pregabalin (LYRICA) 25 MG capsule, Adult Pain         Clinic Follow-Up Order            (K56.1) Jejunal intussusception (H)  Comment:   Plan: Adult Pain Clinic Follow-Up Order            (M54.50,  G89.29) Chronic bilateral low back pain without sciatica  Comment:   Plan: pregabalin (LYRICA) 25 MG capsule, Adult Pain         Clinic Follow-Up Order            (G89.4) Chronic pain syndrome  Comment:   Plan: pregabalin (LYRICA) 25 MG capsule, Adult Pain         Clinic Follow-Up Order            (F11.90) Chronic, continuous use of opioids  Comment:   Plan: Adult Pain Clinic Follow-Up Order                  BILLING TIME DOCUMENTATION:   TOTAL TIME includes:   Time spent preparing to see the patient: 3 minutes (reviewing records and tests)  Time spend face to face with the patient: 10 minutes  Time spent ordering tests, medications, procedures and referrals: 0 minutes  Time spent Referring and communicating with other healthcare professionals: 0 minutes  Documenting clinical information in Epic: 4 minutes    The total TIME spent on this patient on the day of the appointment was 17 minutes.           Natalie MENARD, RN CNP, FNP  [facility]  [facility] [place]

## 2025-03-18 NOTE — TELEPHONE ENCOUNTER
[facility] Call Center    Phone Message    May a detailed message be left on voicemail: yes     Reason for Call:  Pt called and wants to know if today's visit can be done via telephone. He does not have transportation.  He sent a [employer] message as well.  If you call, today only please call him at [phone].

## 2025-03-21 ENCOUNTER — MYC REFILL (OUTPATIENT)
Dept: INTERNAL MEDICINE | Facility: CLINIC | Age: 55
End: 2025-03-21
Payer: COMMERCIAL

## 2025-03-21 DIAGNOSIS — F41.9 CHRONIC ANXIETY: ICD-10-CM

## 2025-03-21 DIAGNOSIS — F90.0 ADHD (ATTENTION DEFICIT HYPERACTIVITY DISORDER), INATTENTIVE TYPE: ICD-10-CM

## 2025-03-22 ENCOUNTER — MYC REFILL (OUTPATIENT)
Dept: INTERNAL MEDICINE | Facility: CLINIC | Age: 55
End: 2025-03-22
Payer: COMMERCIAL

## 2025-03-22 DIAGNOSIS — Z98.84 S/P BARIATRIC SURGERY: ICD-10-CM

## 2025-03-24 RX ORDER — NYSTATIN 100000 U/G
CREAM TOPICAL DAILY PRN
Qty: 30 G | Refills: 3 | Status: SHIPPED | OUTPATIENT
Start: 2025-03-24

## 2025-03-24 RX ORDER — ALPRAZOLAM 0.5 MG
TABLET ORAL
Qty: 60 TABLET | Refills: 0 | Status: SHIPPED | OUTPATIENT
Start: 2025-03-24

## 2025-03-24 RX ORDER — DEXTROAMPHETAMINE SACCHARATE, AMPHETAMINE ASPARTATE, DEXTROAMPHETAMINE SULFATE AND AMPHETAMINE SULFATE 5; 5; 5; 5 MG/1; MG/1; MG/1; MG/1
TABLET ORAL
Qty: 30 TABLET | Refills: 0 | Status: SHIPPED | OUTPATIENT
Start: 2025-03-24

## 2025-03-25 ENCOUNTER — MYC MEDICAL ADVICE (OUTPATIENT)
Dept: FAMILY MEDICINE | Facility: CLINIC | Age: 55
End: 2025-03-25
Payer: COMMERCIAL

## 2025-03-25 ENCOUNTER — TELEPHONE (OUTPATIENT)
Dept: INTERNAL MEDICINE | Facility: CLINIC | Age: 55
End: 2025-03-25
Payer: COMMERCIAL

## 2025-03-25 DIAGNOSIS — Z98.84 S/P BARIATRIC SURGERY: Primary | ICD-10-CM

## 2025-03-25 NOTE — TELEPHONE ENCOUNTER
Pt requested Protonix (Pantoprazole) last filled here   Sharon Fair, Pharmacy [facility] [place] [phone]

## 2025-03-25 NOTE — TELEPHONE ENCOUNTER
Pantoprazole is not on his list.  Please see if he is taking this or how he takes it through his G-tube.  I can then do a refill if needed.

## 2025-03-25 NOTE — TELEPHONE ENCOUNTER
Sent fuad Lanza RN  [facility] - Registered Nurse  Clinic Triage [place]ey[place]   March 25, 2025

## 2025-03-26 RX ORDER — PANTOPRAZOLE SODIUM 40 MG/1
40 TABLET, DELAYED RELEASE ORAL DAILY
Qty: 90 TABLET | Refills: 1 | Status: SHIPPED | OUTPATIENT
Start: 2025-03-26

## 2025-04-04 ENCOUNTER — TELEPHONE (OUTPATIENT)
Dept: ENDOCRINOLOGY | Facility: CLINIC | Age: 55
End: 2025-04-04
Payer: COMMERCIAL

## 2025-04-04 NOTE — TELEPHONE ENCOUNTER
Home Health Care    Reason for call:  Patients wife reached to the nurse re; TPN through [facility]. Caller stated it it somewhat urgent    Orders are needed for this patient.  Skilled Nursing:      Pt Provider: Dr Vyas    Phone Number Homecare Nurse can be reached at: [phone]      Could we send this information to you in [facility] or would you prefer to receive a phone call?:   Patient would prefer a phone call   Okay to leave a detailed message?: Yes at Other phone number:  [phone]*

## 2025-04-07 ENCOUNTER — TELEPHONE (OUTPATIENT)
Dept: SURGERY | Facility: CLINIC | Age: 55
End: 2025-04-07
Payer: COMMERCIAL

## 2025-04-07 NOTE — TELEPHONE ENCOUNTER
General Call      Reason for Call:  call back    What are your questions or concerns:  Kristy Elder, would like a call back regarding pts N     [phone], Jael

## 2025-04-08 NOTE — TELEPHONE ENCOUNTER
Spoke to Jael at [facility]. Will plan to fax her referral , last note, face sheet and IR cath placement note on 4/9. Fax number [phone]

## 2025-04-09 ENCOUNTER — MYC REFILL (OUTPATIENT)
Dept: INTERNAL MEDICINE | Facility: CLINIC | Age: 55
End: 2025-04-09
Payer: COMMERCIAL

## 2025-04-09 DIAGNOSIS — E53.8 VITAMIN B12 DEFICIENCY (NON ANEMIC): ICD-10-CM

## 2025-04-09 DIAGNOSIS — R11.0 NAUSEA: ICD-10-CM

## 2025-04-09 DIAGNOSIS — Z98.84 S/P BARIATRIC SURGERY: ICD-10-CM

## 2025-04-09 RX ORDER — CYANOCOBALAMIN 1000 UG/ML
1 INJECTION, SOLUTION INTRAMUSCULAR; SUBCUTANEOUS
Qty: 3 ML | Refills: 0 | Status: SHIPPED | OUTPATIENT
Start: 2025-04-09

## 2025-04-09 RX ORDER — SUCRALFATE ORAL 1 G/10ML
1 SUSPENSION ORAL 4 TIMES DAILY PRN
Qty: 414 ML | Refills: 1 | Status: SHIPPED | OUTPATIENT
Start: 2025-04-09

## 2025-04-09 RX ORDER — NEEDLES, SAFETY 18GX1 1/2"
1 NEEDLE, DISPOSABLE MISCELLANEOUS
Qty: 30 EACH | Refills: 1 | Status: SHIPPED | OUTPATIENT
Start: 2025-04-09

## 2025-04-09 RX ORDER — ONDANSETRON 8 MG/1
TABLET, ORALLY DISINTEGRATING ORAL
Qty: 90 TABLET | Refills: 1 | Status: SHIPPED | OUTPATIENT
Start: 2025-04-09

## 2025-04-22 ENCOUNTER — MYC REFILL (OUTPATIENT)
Dept: INTERNAL MEDICINE | Facility: CLINIC | Age: 55
End: 2025-04-22
Payer: COMMERCIAL

## 2025-04-22 DIAGNOSIS — F90.0 ADHD (ATTENTION DEFICIT HYPERACTIVITY DISORDER), INATTENTIVE TYPE: ICD-10-CM

## 2025-04-22 DIAGNOSIS — F41.9 CHRONIC ANXIETY: ICD-10-CM

## 2025-04-23 RX ORDER — ALPRAZOLAM 0.5 MG
TABLET ORAL
Qty: 60 TABLET | Refills: 0 | Status: SHIPPED | OUTPATIENT
Start: 2025-04-23

## 2025-04-23 RX ORDER — DEXTROAMPHETAMINE SACCHARATE, AMPHETAMINE ASPARTATE, DEXTROAMPHETAMINE SULFATE AND AMPHETAMINE SULFATE 5; 5; 5; 5 MG/1; MG/1; MG/1; MG/1
TABLET ORAL
Qty: 30 TABLET | Refills: 0 | Status: SHIPPED | OUTPATIENT
Start: 2025-04-23

## 2025-04-24 ENCOUNTER — OFFICE VISIT (OUTPATIENT)
Dept: ENDOCRINOLOGY | Facility: CLINIC | Age: 55
End: 2025-04-24
Payer: COMMERCIAL

## 2025-04-24 VITALS
DIASTOLIC BLOOD PRESSURE: 84 MMHG | OXYGEN SATURATION: 96 % | BODY MASS INDEX: 31.1 KG/M2 | HEIGHT: 69 IN | SYSTOLIC BLOOD PRESSURE: 120 MMHG | HEART RATE: 65 BPM | WEIGHT: 210 LBS

## 2025-04-24 DIAGNOSIS — R13.10 DYSPHAGIA, UNSPECIFIED TYPE: Primary | ICD-10-CM

## 2025-04-24 ASSESSMENT — PAIN SCALES - GENERAL: PAINLEVEL_OUTOF10: SEVERE PAIN (7)

## 2025-04-24 NOTE — NURSING NOTE
"Chief Complaint   Patient presents with    RECHECK     G tube change       Vitals:    04/24/25 0853   BP: 120/84   BP Location: Left arm   Patient Position: Sitting   Pulse: 65   SpO2: 96%   Weight: 95.3 kg (210 lb)   Height: 1.753 m (5' 9\")       Body mass index is 31.01 kg/m .    Jyothi Armstrong, EMT    "

## 2025-04-24 NOTE — PROGRESS NOTES
I had the pleasure of seeing your patient, Parker Acevedo, in my post-bariatric assessment clinic.    As you know, he had a diagnosis of morbid obesity and underwent open RYGB surgery to treat obesity in association with his medical conditions of obesity.    Parker's weight at time was ~500 lbs.    He has severe spine problems which require chronic narcotics; he also has had several abdominal surgeries related marginal ulcers; he has been a long-term chronic smoker and this necessitated his revisional surgeries to treat stubborn ulcer.    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    He has required TPN for at least 10 yrs.    He may have food by mouth for comfort, but is truly unable to tolerated food for nutritional support.    In addition, he has g-tube in gastric remnant which is only used as an overflow valve to essentially blow off extra air/gas or fluid overflow.  The g-tube cannot be used for nutritional support; this has been tried previously.        Most recent weights:  Wt Readings from Last 4 Encounters:   04/24/25 95.3 kg (210 lb)   01/02/25 91.7 kg (202 lb 1.6 oz)   12/16/24 86.5 kg (190 lb 11.2 oz)   09/05/24 88.8 kg (195 lb 11.2 oz)       Currently, his Body mass index is 31.01 kg/m .    ACTIVE MEDICAL PROBLEMS    Patient Active Problem List   Diagnosis    Peptic ulcer disease    Gastric bypass status for obesity    Chronic abdominal pain    Vomiting    Anemia    ADHD (attention deficit hyperactivity disorder), inattentive type    Vitamin B12 deficiency without anemia    Thiamine deficiency    Dehydration    Dysphagia    Weight loss, non-intentional    Malnutrition    Chronic anxiety    Constipation    Bile reflux esophagitis    Former smoker    Vitamin D deficiency    Iron deficiency    Health Care Home    Chronic pain    Insomnia    Positive blood culture    Fungemia    Chronic  "nausea    Other cardiomyopathy (H)    Short gut syndrome    Anxiety    Status post cervical spinal arthrodesis    Port or reservoir infection, initial encounter    Abnormal echocardiogram    Low serum iron    Anemia, iron deficiency    Catheter-related bloodstream infection (CRBSI)    Generalized weakness    S/P bariatric surgery    Hyperlipidemia LDL goal <130    Infection by Candida species    PICC line infiltration, sequela    Gram-positive bacteremia    On total parenteral nutrition    Gastric outlet obstruction    Short bowel syndrome    Bloodstream infection associated with central venous catheter, initial encounter    Fever, unknown origin    Bacteremia associated with intravascular line, initial encounter    Gram-negative bacteremia    Cellulitis of left upper extremity    Jejunal intussusception (H)    Bacteremia    History of Celestino-en-Y gastric bypass      Past Medical History:   Diagnosis Date    ADHD (attention deficit hyperactivity disorder)     Anxiety     Cardiomyopathy in nutritional diseases (H)     mild EF ~45% on rest 2/13/17, improves with stressing    Chronic abdominal pain     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    Complication of anesthesia     Difficulty swallowing     Gastric ulcer, unspecified as acute or chronic, without mention of hemorrhage, perforation, or obstruction     Gastro-oesophageal reflux disease     Head injury     Hiatal hernia     Other bladder disorder     Other chronic pain     PONV (postoperative nausea and vomiting)     Severe malnutrition     TPN    Short gut syndrome     Tobacco abuse        PHYSICAL EXAMINATION  /84 (BP Location: Left arm, Patient Position: Sitting)   Pulse 65   Ht 1.753 m (5' 9\")   Wt 95.3 kg (210 lb)   SpO2 96%   BMI 31.01 kg/m     Body mass index is 31.01 kg/m .   NAD NCAT  Respiratory: breathing unlabored  Abdomen: s/nt/nd; inc c/d/I; has large incisional hernia    I reviewed the post-bariatric clinic " questionnaire available in the epic system as a separate document.       Tolerating regular texture foods: no    Special testing: will need full set of labs.    Restart TPN when able.    G-tube was replaced today with 16Fr x 2.0cm COLIN tube.    No orders of the defined types were placed in this encounter.      PLAN  1. See orders from encounter.  2. Can think about ventral hernia when he has quit smoking and has appropriate nourishment.  3. Follow-up: prn months.       If you have any questions about our plans, please don't hesitate to contact me.         Francis Vyas MD  Surgery  [phone] (hospital )  [phone] (clinic nurses)                  Main follow-up parameters for all bariatric patients (non-band):   1. Follow-up interval during the first year: ~1 week, 1 month, 3, 6, 9, 12 months.  Every 6 months until 5 years; annually thereafter.  The goal of follow-up sessions is to ensure that food intake behavior (choice of food and eating speed) and exercise/activity level are set appropriately.  2. Yearly lab tests ordered by our clinic.   3. The bariatric team should be aware and potentially evaluate all adverse gastrointestinal symptoms (dysphagia, abdominal pain, nausea, vomiting, diarrhea, heartburn, reflux, etc), which can be a sign of complication.  The bariatric surgeons perform general surgery procedures in addition to bariatric surgery (laparoscopic cholecystectomy, etc.)  4. Inability to tolerate textured food (chicken, steak, fish, eggs, beans) is NOT normal and may need to be evaluated by endoscopy performed by the bariatric surgeon.   5. All non-band patients should supplement with mvi bid (flintstones or equivalent), calcium citrate with vitamin D, 1200+ mg/day, and vitamin B12 1000 mcg IM q month (or 1000 mcg SL qday).  6. All duodenal switch (biliopancreatic diversion with duodenal switch) patients require additional fat soluble vitamin supplementation (3 ADEK tablets every day;  each contains vitamin A - 5667 IU; vitamin D - 400 IU; vitamin E - 150 IU; and vitamin K - 150 mcg)

## 2025-04-24 NOTE — LETTER
4/24/2025       RE: Parker Acevedo  [address]  [address]  [place] [address]     Dear Colleague,    Thank you for referring your patient, Parker Acevedo, to the [facility] [place] [facility]. Please see a copy of my visit note below.    I had the pleasure of seeing your patient, Parker Acevedo, in my post-bariatric assessment clinic.    As you know, he had a diagnosis of morbid obesity and underwent open RYGB surgery to treat obesity in association with his medical conditions of obesity.    Parker's weight at time was ~500 lbs.    He has severe spine problems which require chronic narcotics; he also has had several abdominal surgeries related marginal ulcers; he has been a long-term chronic smoker and this necessitated his revisional surgeries to treat stubborn ulcer.    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    He has required TPN for at least 10 yrs.    He may have food by mouth for comfort, but is truly unable to tolerated food for nutritional support.    In addition, he has g-tube in gastric remnant which is only used as an overflow valve to essentially blow off extra air/gas or fluid overflow.  The g-tube cannot be used for nutritional support; this has been tried previously.        Most recent weights:  Wt Readings from Last 4 Encounters:   04/24/25 95.3 kg (210 lb)   01/02/25 91.7 kg (202 lb 1.6 oz)   12/16/24 86.5 kg (190 lb 11.2 oz)   09/05/24 88.8 kg (195 lb 11.2 oz)       Currently, his Body mass index is 31.01 kg/m .    ACTIVE MEDICAL PROBLEMS    Patient Active Problem List   Diagnosis     Peptic ulcer disease     Gastric bypass status for obesity     Chronic abdominal pain     Vomiting     Anemia     ADHD (attention deficit hyperactivity disorder), inattentive type     Vitamin B12 deficiency  without anemia     Thiamine deficiency     Dehydration     Dysphagia     Weight loss, non-intentional     Malnutrition     Chronic anxiety     Constipation     Bile reflux esophagitis     Former smoker     Vitamin D deficiency     Iron deficiency     Health Care Home     Chronic pain     Insomnia     Positive blood culture     Fungemia     Chronic nausea     Other cardiomyopathy (H)     Short gut syndrome     Anxiety     Status post cervical spinal arthrodesis     Port or reservoir infection, initial encounter     Abnormal echocardiogram     Low serum iron     Anemia, iron deficiency     Catheter-related bloodstream infection (CRBSI)     Generalized weakness     S/P bariatric surgery     Hyperlipidemia LDL goal <130     Infection by Candida species     PICC line infiltration, sequela     Gram-positive bacteremia     On total parenteral nutrition     Gastric outlet obstruction     Short bowel syndrome     Bloodstream infection associated with central venous catheter, initial encounter     Fever, unknown origin     Bacteremia associated with intravascular line, initial encounter     Gram-negative bacteremia     Cellulitis of left upper extremity     Jejunal intussusception (H)     Bacteremia     History of Celestino-en-Y gastric bypass      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     Complication of anesthesia      Difficulty swallowing      Gastric ulcer, unspecified as acute or chronic, without mention of hemorrhage, perforation, or obstruction      Gastro-oesophageal reflux disease      Head injury      Hiatal hernia      Other bladder disorder      Other chronic pain      PONV (postoperative nausea and vomiting)      Severe malnutrition     TPN      "Short gut syndrome      Tobacco abuse        PHYSICAL EXAMINATION  /84 (BP Location: Left arm, Patient Position: Sitting)   Pulse 65   Ht 1.753 m (5' 9\")   Wt 95.3 kg (210 lb)   SpO2 96%   BMI 31.01 kg/m     Body mass index is 31.01 kg/m .   NAD NCAT  Respiratory: breathing unlabored  Abdomen: s/nt/nd; inc c/d/I; has large incisional hernia    I reviewed the post-bariatric clinic questionnaire available in the epic system as a separate document.       Tolerating regular texture foods: no    Special testing: will need full set of labs.    Restart TPN when able.    G-tube was replaced today with 16Fr x 2.0cm COLIN tube.    No orders of the defined types were placed in this encounter.      PLAN  1. See orders from encounter.  2. Can think about ventral hernia when he has quit smoking and has appropriate nourishment.  3. Follow-up: prn months.       If you have any questions about our plans, please don't hesitate to contact me.         Francis Vyas MD  Surgery  [phone] (hospital )  [phone] (clinic nurses)                  Main follow-up parameters for all bariatric patients (non-band):   1. Follow-up interval during the first year: ~1 week, 1 month, 3, 6, 9, 12 months.  Every 6 months until 5 years; annually thereafter.  The goal of follow-up sessions is to ensure that food intake behavior (choice of food and eating speed) and exercise/activity level are set appropriately.  2. Yearly lab tests ordered by our clinic.   3. The bariatric team should be aware and potentially evaluate all adverse gastrointestinal symptoms (dysphagia, abdominal pain, nausea, vomiting, diarrhea, heartburn, reflux, etc), which can be a sign of complication.  The bariatric surgeons perform general surgery procedures in addition to bariatric surgery (laparoscopic cholecystectomy, etc.)  4. Inability to tolerate textured food (chicken, steak, fish, eggs, beans) is NOT normal and may need to be evaluated by endoscopy " performed by the bariatric surgeon.   5. All non-band patients should supplement with mvi bid (flintstones or equivalent), calcium citrate with vitamin D, 1200+ mg/day, and vitamin B12 1000 mcg IM q month (or 1000 mcg SL qday).  6. All duodenal switch (biliopancreatic diversion with duodenal switch) patients require additional fat soluble vitamin supplementation (3 ADEK tablets every day; each contains vitamin A - 5667 IU; vitamin D - 400 IU; vitamin E - 150 IU; and vitamin K - 150 mcg)          Again, thank you for allowing me to participate in the care of your patient.      Sincerely,    Francis Vyas MD

## 2025-04-27 ENCOUNTER — MYC REFILL (OUTPATIENT)
Dept: INTERNAL MEDICINE | Facility: CLINIC | Age: 55
End: 2025-04-27
Payer: COMMERCIAL

## 2025-04-27 DIAGNOSIS — Z98.84 S/P BARIATRIC SURGERY: ICD-10-CM

## 2025-04-28 RX ORDER — NYSTATIN 100000 U/G
CREAM TOPICAL DAILY PRN
Qty: 30 G | Refills: 3 | OUTPATIENT
Start: 2025-04-28

## 2025-05-01 ENCOUNTER — MYC MEDICAL ADVICE (OUTPATIENT)
Dept: ENDOCRINOLOGY | Facility: CLINIC | Age: 55
End: 2025-05-01
Payer: COMMERCIAL

## 2025-05-01 DIAGNOSIS — E43 SEVERE PROTEIN-CALORIE MALNUTRITION: Primary | ICD-10-CM

## 2025-05-01 DIAGNOSIS — Z98.84 HISTORY OF ROUX-EN-Y GASTRIC BYPASS: ICD-10-CM

## 2025-05-06 NOTE — PROGRESS NOTES
Exchanged KASSANDRA button in clinic.    Switched out old for new; the skin around the KASSANDRA looks fine.    Using kassandra for pressure release.  Not using for tube feeds.    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

## 2025-05-07 ENCOUNTER — TELEPHONE (OUTPATIENT)
Dept: PALLIATIVE MEDICINE | Facility: CLINIC | Age: 55
End: 2025-05-07
Payer: COMMERCIAL

## 2025-05-07 NOTE — TELEPHONE ENCOUNTER
Prior Authorization Retail Medication Request    Medication/Dose: oxyCODONE (ROXICODONE) 5 MG/5ML solution    BCBS - [employer] MEDICARE ADVANTAGE  Subscriber:  Parker Richflorentino  Subscriber ID:[id number]  Relationship:Self  Member:Parker Richflorentino  Member ID:[id number]  LOB:None  Plan year:  1/1/2025 - [place]  Effective dates:  1/1/2019 - [place]  Group number:  [id number]      [address] [address]  Phone: [phone] Fax: [phone]

## 2025-05-08 SDOH — HEALTH STABILITY: PHYSICAL HEALTH: ON AVERAGE, HOW MANY MINUTES DO YOU ENGAGE IN EXERCISE AT THIS LEVEL?: 10 MIN

## 2025-05-08 SDOH — HEALTH STABILITY: PHYSICAL HEALTH: ON AVERAGE, HOW MANY DAYS PER WEEK DO YOU ENGAGE IN MODERATE TO STRENUOUS EXERCISE (LIKE A BRISK WALK)?: 3 DAYS

## 2025-05-08 ASSESSMENT — SOCIAL DETERMINANTS OF HEALTH (SDOH): HOW OFTEN DO YOU GET TOGETHER WITH FRIENDS OR RELATIVES?: NEVER

## 2025-05-12 ENCOUNTER — MYC REFILL (OUTPATIENT)
Dept: INTERNAL MEDICINE | Facility: CLINIC | Age: 55
End: 2025-05-12
Payer: COMMERCIAL

## 2025-05-12 ENCOUNTER — MYC REFILL (OUTPATIENT)
Dept: PALLIATIVE MEDICINE | Facility: CLINIC | Age: 55
End: 2025-05-12
Payer: COMMERCIAL

## 2025-05-12 DIAGNOSIS — R11.0 NAUSEA: ICD-10-CM

## 2025-05-12 DIAGNOSIS — G89.29 CHRONIC BILATERAL LOW BACK PAIN WITHOUT SCIATICA: ICD-10-CM

## 2025-05-12 DIAGNOSIS — G89.29 CHRONIC ABDOMINAL PAIN: ICD-10-CM

## 2025-05-12 DIAGNOSIS — R19.8 VISCERAL HYPERALGESIA: ICD-10-CM

## 2025-05-12 DIAGNOSIS — G89.4 CHRONIC PAIN SYNDROME: ICD-10-CM

## 2025-05-12 DIAGNOSIS — M54.50 CHRONIC BILATERAL LOW BACK PAIN WITHOUT SCIATICA: ICD-10-CM

## 2025-05-12 DIAGNOSIS — F11.90 CHRONIC, CONTINUOUS USE OF OPIOIDS: ICD-10-CM

## 2025-05-12 DIAGNOSIS — R10.9 CHRONIC ABDOMINAL PAIN: ICD-10-CM

## 2025-05-12 RX ORDER — FENTANYL 25 UG/1
1 PATCH TRANSDERMAL
Qty: 15 PATCH | Refills: 0 | Status: SHIPPED | OUTPATIENT
Start: 2025-05-12

## 2025-05-12 RX ORDER — OXYCODONE HCL 5 MG/5 ML
SOLUTION, ORAL ORAL
Qty: 1350 ML | Refills: 0 | Status: SHIPPED | OUTPATIENT
Start: 2025-05-12

## 2025-05-12 RX ORDER — ONDANSETRON 8 MG/1
TABLET, ORALLY DISINTEGRATING ORAL
Qty: 90 TABLET | Refills: 1 | Status: SHIPPED | OUTPATIENT
Start: 2025-05-12

## 2025-05-12 NOTE — TELEPHONE ENCOUNTER
Medication refill information reviewed.     Due date for oxyCODONE (ROXICODONE) 5 MG/5ML solution and fentaNYL (DURAGESIC) 25 mcg/hr 72 hr patch  is 05/22/25     Prescriptions prepped for review.     Will route to provider.

## 2025-05-12 NOTE — TELEPHONE ENCOUNTER
PA Initiation    Medication: OXYCODONE HCL 5 MG/5ML PO SOLN  Insurance Company: [employer] - Phone [phone] Fax [phone]  Pharmacy Filling the Rx: [facility] [place], [place] - [address] [place] [address]  Filling Pharmacy Phone: [phone]  Filling Pharmacy Fax: [phone]  Start Date: 5/11/2025

## 2025-05-12 NOTE — TELEPHONE ENCOUNTER
Received request for a refill(s) of   oxyCODONE (ROXICODONE) 5 MG/5ML solution  fentaNYL (DURAGESIC) 25 mcg/hr 72 hr patch     Last dispensed from pharmacy on     oxyCODONE (ROXICODONE) 5 MG/5ML solution - 4/21/2025  fentaNYL (DURAGESIC) 25 mcg/hr 72 hr patch - 4/21/2025    Patient's last office/virtual visit by prescribing provider on 3/18/2025  Next office/virtual appointment scheduled for 6/24/2025    Last urine drug screen date 10/14/2024  Current opioid agreement on file (completed within the last year) YesDate of opioid agreement: 10/16/2024    [place] [facility] [place] [address]   pharmacy    Will route to nursing [place] for review and preparation of prescription(s).

## 2025-05-12 NOTE — TELEPHONE ENCOUNTER
Signed Prescriptions:                        Disp   Refills    fentaNYL (DURAGESIC) 25 mcg/hr 72 hr patch 15 pat*0        Sig: Place 1 patch onto the skin every 48 hours. 30 day           supply for chronic pain. OK to fill 05/20/25 and           start 05/22/25.  Authorizing Provider: NATALIE MCDOWELL    oxyCODONE (ROXICODONE) 5 MG/5ML solution   1350 mL0        Sig: Take 15 mLs (15 mg) by mouth at bedtime. May also           take 10 mLs (10 mg) every 4 hours as needed for           moderate to severe pain. Max of 45 mg/day. 30 day           supply for chronic pain. OK to fill 05/20/25 and           start 05/22/25.  Authorizing Provider: NATALIE MCDOWELL        Reviewed [place]  May 12, 2025- no concerning fills.    Natalie MENARD, RN CNP, FNP  [facility]  [facility]

## 2025-05-12 NOTE — TELEPHONE ENCOUNTER
Refills have been requested for the following medications:         fentaNYL (DURAGESIC) 25 mcg/hr 72 hr patch [Natalie Hull]         oxyCODONE (ROXICODONE) 5 MG/5ML solution [Natalie Hull]     Preferred pharmacy: [facility] [place], [place] - [address] [place] [address]

## 2025-05-12 NOTE — TELEPHONE ENCOUNTER
Prior Authorization Approval    Medication: OXYCODONE HCL 5 MG/5ML PO SOLN  Authorization Effective Date: 2/11/2025  Authorization Expiration Date: 5/12/2026  Approved Dose/Quantity:   Reference #:     Insurance Company: [employer] - Phone [phone] Fax [phone]  Expected CoPay: $    CoPay Card Available:      Financial Assistance Needed:   Which Pharmacy is filling the prescription: [facility] [place] - [address] [place] [address]  Pharmacy Notified: YES  Patient Notified: **Instructed pharmacy to notify patient when script is ready to /ship.**

## 2025-05-19 DIAGNOSIS — R93.1 ABNORMAL ECHOCARDIOGRAM: ICD-10-CM

## 2025-05-19 RX ORDER — CARVEDILOL 6.25 MG/1
12.5 TABLET ORAL 2 TIMES DAILY WITH MEALS
Qty: 360 TABLET | Refills: 0 | Status: SHIPPED | OUTPATIENT
Start: 2025-05-19

## 2025-05-20 ENCOUNTER — MYC REFILL (OUTPATIENT)
Dept: INTERNAL MEDICINE | Facility: CLINIC | Age: 55
End: 2025-05-20
Payer: COMMERCIAL

## 2025-05-20 DIAGNOSIS — F90.0 ADHD (ATTENTION DEFICIT HYPERACTIVITY DISORDER), INATTENTIVE TYPE: ICD-10-CM

## 2025-05-20 DIAGNOSIS — E55.9 VITAMIN D DEFICIENCY: ICD-10-CM

## 2025-05-20 DIAGNOSIS — F41.9 CHRONIC ANXIETY: ICD-10-CM

## 2025-05-21 RX ORDER — DEXTROAMPHETAMINE SACCHARATE, AMPHETAMINE ASPARTATE, DEXTROAMPHETAMINE SULFATE AND AMPHETAMINE SULFATE 5; 5; 5; 5 MG/1; MG/1; MG/1; MG/1
TABLET ORAL
Qty: 30 TABLET | Refills: 0 | Status: SHIPPED | OUTPATIENT
Start: 2025-05-21

## 2025-05-21 RX ORDER — ERGOCALCIFEROL 1.25 MG/1
50000 CAPSULE, LIQUID FILLED ORAL WEEKLY
Qty: 12 CAPSULE | Refills: 3 | Status: SHIPPED | OUTPATIENT
Start: 2025-05-21

## 2025-05-21 RX ORDER — ALPRAZOLAM 0.5 MG
TABLET ORAL
Qty: 60 TABLET | Refills: 0 | Status: SHIPPED | OUTPATIENT
Start: 2025-05-21

## 2025-06-04 NOTE — TELEPHONE ENCOUNTER
Form sent back to sender with message that AE has never seen patient  Astrid Campos CMA     Render In Strict Bullet Format?: No Detail Level: Zone Continue Regimen: Skyrizi 150 mg/mL subcutaneous pen -- inject 1 pen every 12 weeks

## 2025-06-05 ENCOUNTER — MYC REFILL (OUTPATIENT)
Dept: INTERNAL MEDICINE | Facility: CLINIC | Age: 55
End: 2025-06-05
Payer: COMMERCIAL

## 2025-06-05 DIAGNOSIS — R11.0 NAUSEA: ICD-10-CM

## 2025-06-05 RX ORDER — ONDANSETRON 8 MG/1
TABLET, ORALLY DISINTEGRATING ORAL
Qty: 90 TABLET | Refills: 1 | OUTPATIENT
Start: 2025-06-05

## 2025-06-16 ENCOUNTER — HOSPITAL ENCOUNTER (OUTPATIENT)
Dept: NUCLEAR MEDICINE | Facility: CLINIC | Age: 55
Setting detail: NUCLEAR MEDICINE
Discharge: HOME | End: 2025-06-16
Attending: SURGERY | Admitting: SURGERY
Payer: COMMERCIAL

## 2025-06-16 DIAGNOSIS — Z98.84 HISTORY OF ROUX-EN-Y GASTRIC BYPASS: ICD-10-CM

## 2025-06-16 DIAGNOSIS — E43 SEVERE PROTEIN-CALORIE MALNUTRITION: ICD-10-CM

## 2025-06-16 PROCEDURE — 343N000001 HC RX 343 MED OP 636: Performed by: SURGERY

## 2025-06-16 PROCEDURE — 78264 GASTRIC EMPTYING IMG STUDY: CPT

## 2025-06-16 PROCEDURE — A9541 TC99M SULFUR COLLOID: HCPCS | Performed by: SURGERY

## 2025-06-16 RX ADMIN — TECHNETIUM TC 99M SULFUR COLLOID 2.12 MILLICURIE: KIT at 08:18

## 2025-06-18 ENCOUNTER — MYC REFILL (OUTPATIENT)
Dept: INTERNAL MEDICINE | Facility: CLINIC | Age: 55
End: 2025-06-18
Payer: COMMERCIAL

## 2025-06-18 DIAGNOSIS — F41.9 CHRONIC ANXIETY: ICD-10-CM

## 2025-06-18 DIAGNOSIS — F90.0 ADHD (ATTENTION DEFICIT HYPERACTIVITY DISORDER), INATTENTIVE TYPE: ICD-10-CM

## 2025-06-19 RX ORDER — DEXTROAMPHETAMINE SACCHARATE, AMPHETAMINE ASPARTATE, DEXTROAMPHETAMINE SULFATE AND AMPHETAMINE SULFATE 5; 5; 5; 5 MG/1; MG/1; MG/1; MG/1
TABLET ORAL
Qty: 30 TABLET | Refills: 0 | Status: SHIPPED | OUTPATIENT
Start: 2025-06-19

## 2025-06-19 RX ORDER — ALPRAZOLAM 0.5 MG
TABLET ORAL
Qty: 60 TABLET | Refills: 0 | Status: SHIPPED | OUTPATIENT
Start: 2025-06-19

## 2025-06-19 ASSESSMENT — PAIN SCALES - PAIN ENJOYMENT GENERAL ACTIVITY SCALE (PEG)
AVG_PAIN_PASTWEEK: 5
PEG_TOTALSCORE: 4.33
INTERFERED_GENERAL_ACTIVITY: 4
INTERFERED_ENJOYMENT_LIFE: 4

## 2025-06-24 ENCOUNTER — OFFICE VISIT (OUTPATIENT)
Dept: PALLIATIVE MEDICINE | Facility: CLINIC | Age: 55
End: 2025-06-24
Attending: NURSE PRACTITIONER
Payer: COMMERCIAL

## 2025-06-24 ENCOUNTER — LAB (OUTPATIENT)
Dept: LAB | Facility: CLINIC | Age: 55
End: 2025-06-24
Payer: COMMERCIAL

## 2025-06-24 VITALS — SYSTOLIC BLOOD PRESSURE: 116 MMHG | HEART RATE: 70 BPM | DIASTOLIC BLOOD PRESSURE: 74 MMHG

## 2025-06-24 DIAGNOSIS — Z79.891 ENCOUNTER FOR LONG-TERM USE OF OPIATE ANALGESIC: ICD-10-CM

## 2025-06-24 DIAGNOSIS — G89.29 CHRONIC BILATERAL LOW BACK PAIN WITHOUT SCIATICA: ICD-10-CM

## 2025-06-24 DIAGNOSIS — R19.8 VISCERAL HYPERALGESIA: Primary | ICD-10-CM

## 2025-06-24 DIAGNOSIS — M54.50 CHRONIC BILATERAL LOW BACK PAIN WITHOUT SCIATICA: ICD-10-CM

## 2025-06-24 DIAGNOSIS — R10.9 CHRONIC ABDOMINAL PAIN: ICD-10-CM

## 2025-06-24 DIAGNOSIS — F11.90 CHRONIC, CONTINUOUS USE OF OPIOIDS: ICD-10-CM

## 2025-06-24 DIAGNOSIS — G89.29 CHRONIC ABDOMINAL PAIN: ICD-10-CM

## 2025-06-24 DIAGNOSIS — G89.4 CHRONIC PAIN SYNDROME: ICD-10-CM

## 2025-06-24 DIAGNOSIS — K56.1 JEJUNAL INTUSSUSCEPTION (H): ICD-10-CM

## 2025-06-24 PROCEDURE — 80307 DRUG TEST PRSMV CHEM ANLYZR: CPT | Mod: 90

## 2025-06-24 PROCEDURE — 80307 DRUG TEST PRSMV CHEM ANLYZR: CPT

## 2025-06-24 PROCEDURE — G0482 DRUG TEST DEF 15-21 CLASSES: HCPCS

## 2025-06-24 PROCEDURE — 99000 SPECIMEN HANDLING OFFICE-LAB: CPT

## 2025-06-24 RX ORDER — FENTANYL 25 UG/1
1 PATCH TRANSDERMAL
Qty: 15 PATCH | Refills: 0 | Status: SHIPPED | OUTPATIENT
Start: 2025-06-24

## 2025-06-24 RX ORDER — PREGABALIN 25 MG/1
25 CAPSULE ORAL AT BEDTIME
Qty: 90 CAPSULE | Refills: 1 | Status: SHIPPED | OUTPATIENT
Start: 2025-06-24

## 2025-06-24 RX ORDER — OXYCODONE HCL 5 MG/5 ML
SOLUTION, ORAL ORAL
Qty: 1350 ML | Refills: 0 | Status: SHIPPED | OUTPATIENT
Start: 2025-06-24

## 2025-06-24 ASSESSMENT — PAIN SCALES - GENERAL: PAINLEVEL_OUTOF10: MILD PAIN (3)

## 2025-06-24 NOTE — LETTER

## 2025-06-24 NOTE — PROGRESS NOTES
[facility]      6/24/2025    Chief complaint:   Ongoing abdominal pain with a large abdominal hernia that will need repair once he has quit smoking and his nutritional status is better  -accompanied today by his wife throughout visit          Interval history:  Parker Acevedo is a 54 year old male is known to me for:  Visceral hyperalgesia  Chronic abdominal pain  Chronic pain syndrome  PMHx includes: ADHD, anxiety, cardiomyopathy and nutritional diseases, chronic abdominal pain, central line associated bloodstream infection recurrent, anesthesia complication, difficulty swallowing, gastric ulcer unspecified as acute or chronic without mention of hemorrhage perforation or obstruction, gastroesophageal reflux disease, head injury, hiatal hernia, other bladder disorder, other chronic pain, postop nausea and vomiting, severe malnutrition on TPN, short gut syndrome, tobacco abuse  PSHx includes: Appendectomy, back surgery (11/3/2014), biopsy of cervical lymph node (2/20/2015), cholecystectomy, colonoscopy (2021, 2022), cervical anterior 1 level discectomy and fusion (2/15/2017), endoscopic insertion tube gastrostomy (9/9/2013), endoscopic ultrasound upper gastrointestinal tract (4/29/2011), endoscopic ultrasound upper gastrointestinal tract (9/9/2013), endoscopic ultrasound upper gastrointestinal tract (2/24/2021), endoscopy (3/25/2011, 8/4/2009, 1/5/2009), multiple EGDs, gastrectomy (6/22/2012), gastrojejunostomy (8/26/2009), umbilical hernia repair (2006), hernia raphe hiatal (6/22/2012), herniorrhaphy inguinal (11/22/2013), insert PICC line on the right (12/19/2019), insert PICC line right (2/21/2020), insert vascular access port on the right (12/19/2017, 8/2/2018), multiple interventional radiology CVC tunnel placement and removals, exploratory laparotomy (11/22/2013), orthopedic surgery, Celestino-en-Y gastric bypass (2003), tonsillectomy.        Recommendations/plan at the last visit  on 3/18/2025 included:  Physical Therapy: none  Clinical Health Psychologist to address issues of relaxation, behavioral change, coping style, and other factors important to improvement: none  Continue gentle movement at home  Diagnostic Studies: none  Medication Management:   Continue fentanyl patch 25mcg/hr every 48 hours, fill 3/21 and start 3/23    Oxycodone liquid 5mg/5ml, take 15mg at bedtime (15m.) and may take 10mg (10ml) every 4 hours max of 3 doses per day of 10mg. Max of 45mg (45ml) per day.  Fill 3/21 and start 3/23  Restart pregabalin 25mg at bedtime  Reminded not to drink alcohol while on opiate meds   Further procedures recommended: none  Recommendations/follow-up for PCP:  See above  Release of information: none  Follow up: 3 months can be in-person visit        Since his last visit, Parker Acevedo reports:    Interval history June 24, 2025  -Ongoing abdominal pain with a large abdominal hernia that will need repair once he has quit smoking and his nutritional status is better  -wife is here throughout visit.   -notes he may have surgery later this fall.   -he needs to quit smoking first  -plans to work with Primary Care Provider on smoking cessation      Interval history March 18, 2025  -has large abdominal hernia, getting larger, Dr. Vyas will  be having a surgical conference re: possible future surgery for Parker  -he has ongoing abdominal pain and visceral hyperalgesia from multiple previous surgical abdominal procedures with previous complex closure.   -he is interested in adding in a low dose of pregabalin again at bedtime for improved pain control.   -he states that last month's oxycodone solution was  not as good as it typically is,  had changed.     Interval history December 17, 2024  -overall, is doing okay.   -umbilical hernia is getting larger, seen by Dr. Isaac recently and they recommended he see his abdominal surgeon again.   -ongoing abdominal pain  and visceral  hyperalgesia from multiple previous surgical abdominal procedures with complex closure.   -notes his liver enzymes are slightly elevated. These are being followed by his Primary Care Provider   -not sleeping well, asks about what I think about increasing his alprazolam. Discussed that I recommend against increasing benzodiazepines while on opiates as doing so increases risks for opiate overdose and death   -discussed possible use of hydroxyzine for improved sleep, patient prefers not to add to medication list  -discussed trial of herbal tea or hot milk at bedtime, he will try this.       Interval history 2024  -did not like medical cannabis, made him have side effects  Will be needing repeat surgery for huge umbilical hernia, he could not tolerate the girdle Dr. Vyas gave him, made his feeding tube leak  Admits he spilled about 200ml of the oxycodone liquid, he has reduced his use to stretch his supply as he knows that insurance won't cover any additional.   -leaving this Friday to be out of town for a week for a , requests early refill, will not start his meds until 10/24.  He requests to be able to use 5mg more per day, he desires to use 15mg at bedtime as he has increased abdominal pain when he does his overnight feedings.     Interval history 2024  -he is getting settled in his new home.   -has a new abdominal hernia in the last month from carrying large boxes during the move.   -ongoing abdominal pain, he feels that this pain is worse due to the hernia.  -he is still on IV antibiotics given to him by home health  -he tried medical cannabis, he did not like how he felt on cannabis  -he had stopped taking Lyrica a while ago. He did find it helpful. Will re-start this for improvement of neuropathic portion of pain    Interval history 2024  -chronic neuropathic abdominal pain remains.   -he states his pain is worse right now.   -they are in the middle of moving and  "significantly downsizing. This has been quite stressful, so he believes the increase in pain is stress related.   -stable on current medication regimen     Pain history collected at initial visit on 5/27/2022  He had gastric bypass in 2003 and about 5 years after that, he developed gastric ulcers. He ended up having surgery for gastric ulcer and hernias and such. He has had over 11 surgeries for his abdomen. He is malnourished due to short-gut syndrome. He has a J-tube that is open to vent bile from his stomach as he gets bloated.   Worst pain is inside the abdominal cavity. He will have pain so bad that he states he screams for his mother. He feels that this is a deep inside pain.   -he would like to increase his oxycodone dose by one dose per day. His activity is limited by not having medication to cover him for his daily activities.      -he has a DVT in his right arm and in his right jugular vein. He is on lovenox.         At this point, the patient's participation with our multidisciplinary team includes:  The patient has been compliant with the program.  PT - none  Health Psych - none      Pain scores:   Pain right now is 3/10.   Pain average score is 4-6/10.   Pain range is 3-10/10. His pain will pop up but improves over about a half-hour after taking his medication.   Pain is described as \"sharp, burning, agonizing and like on fire, has some dull right and Left UQ, cramping.\"  Pain is constant in nature    Current pain relevant medications:   -tylenol 32mg/ml liquid take 15.65mls (500mg) Q 4 hours PRN fever/mild pain  (somewhat helpful for headaches)  --Duragesic 25mcg/hr Q 48 hours (helpful)  -lidoderm 5% patch PRN (helpful)  -Narcan nasal spray for opiate reversal   -Oxycodone 5mg/5ml solution take 5-10mls (5-10mg) TID PRN max of 40mg/day with 4 hours between doses.  (helpful)     Other pertinent medications:  -Adderall 20mg every day  -LOVENOX 120mg Subcutaneously every day  -lorazepam 1mg for anxiety " with TPN and meds once per day (uses most nights)  -Zofran 8mg Q 8 hours PRN     Previous medication treatments included:  OPIATES: Fentanyl (helpful), morphine (hallucinations), Dilaudid (not helpful, did not dissolve), Tylenol #3 (not helpful), hydrocodone (not helpful), Belbuca (not helpful--he had a really heavy chest feeling)  NSAIDS: cannot take due to severe gastric ulcer disease  MUSCLE RELAXANTS: none  ANTI-MIGRAINE MEDS: none  ANTI-DEPRESSANTS: none  SLEEP AIDS: benadryl (helpful)  ANTI-CONVULSANTS: gabapentin (bad headaches and acid reflux),  TOPICALS: Lidocaine patches (helpful)  ANXIOLYTICS: valium (helpful 5mg dosage), lorazepam (helpful)  MEDICAL CANNABIS: not interested  Other meds: tylenol (helpful for headaches)        Other treatments have included:  Parker Acevedo has been seen at a pain clinic in the past.  Previously managed here by Dr. Jessica Milligan. Also seen by [facility] for possible implanted intrathecal pain pump, he is not candidate due to infection risk.   PT: tried, never helped his neck or abdominal surgery  Massage Therapy: helpful for a day or two  Chiropractic care: tried, helped some   Acupuncture: tried, not helpful  TENs Unit: tried, not helpful  Healing Touch: not helpful     Injections:   -previously had injections for his neck with Dr. Jessica Milligan      Surgeries:  9/30/2023 exploratory laparotomy, reduction of intussusception, resection of small bowel with primary anastamosis, and upper endoscopy with Dr. Mercado (helpful)      THE 4 A's OF OPIOID MAINTENANCE ANALGESIA    Analgesia: keeps pain pretty manageable    Activity: he walks daily, light housekeeping    Adverse effects: none    Adherence to Rx protocol: yes      Side Effects: no side effect  Patient is using the medication as prescribed: YES    Medications:  Current Outpatient Medications   Medication Sig Dispense Refill    albuterol (VENTOLIN HFA) 108 (90 Base) MCG/ACT inhaler Inhale 2 puffs into the lungs every 6  hours as needed. 18 g 1    ALPRAZolam (XANAX) 0.5 MG tablet TAKE ONE TABLET BY MOUTH TWICE A DAY AS NEEDED FOR SLEEP OR FOR ANXIETY 60 tablet 0    amphetamine-dextroamphetamine (ADDERALL) 20 MG tablet TAKE ONE TABLET BY MOUTH ONCE DAILY 30 tablet 0    carvedilol (COREG) 6.25 MG tablet TAKE TWO TABLETS BY MOUTH TWICE A DAY WITH MEALS 360 tablet 0    cyanocobalamin (CYANOCOBALAMIN) 1000 mcg/mL injection Inject 1 mL (1,000 mcg) into the muscle every 30 days. 3 mL 0    enoxaparin ANTICOAGULANT (LOVENOX) 80 MG/0.8ML syringe INJECT THE CONTENTS OF ONE SYRINGE (80MG) UNDER THE SKIN TWO TIMES A DAY 67.2 mL 0    fentaNYL (DURAGESIC) 25 mcg/hr 72 hr patch Place 1 patch onto the skin every 48 hours. 30 day supply for chronic pain. OK to fill 07/18/25 and start 07/21/25. 15 patch 0    lipids plant base (CLINOLIPID) 20 % infusion Inject 250 mLs into the vein every 24 hours 1000 mL 3    naloxone (NARCAN) 4 MG/0.1ML nasal spray Spray 1 spray (4 mg) into one nostril alternating nostrils as needed for opioid reversal. every 2-3 minutes until assistance arrives 0.2 mL 3    nystatin (MYCOSTATIN) 001674 UNIT/GM external cream Apply topically daily as needed for other (around area of J tube). 30 g 3    ondansetron (ZOFRAN ODT) 8 MG ODT tab DISSOLVE ONE TABLET BY MOUTH EVERY 8 HOURS AS NEEDED FOR NAUSEA 90 tablet 1    oxyCODONE (ROXICODONE) 5 MG/5ML solution Take 15 mLs (15 mg) by mouth at bedtime. May also take 10 mLs (10 mg) every 4 hours as needed for moderate to severe pain. Max of 45 mg/day. 30 day supply for chronic pain. OK to fill 07/18/25 and start 07/21/25. 1350 mL 0    pantoprazole (PROTONIX) 40 MG EC tablet Take 1 tablet (40 mg) by mouth daily. 90 tablet 1    parenteral nutrition - PTA/DISCHARGE ORDER Infuse daily. Managed by [employer] [phone]  Total volume: 1260 mL  Cycled over 12 hr  Clinisol 15% 100 gram/day  Dextrose 70% 175 gram/day  Na Acetate 35 mEq/day  K Chloride 85 mEq/day  Mg sulfate 6 mEq/day  Ca  "gluconate 25 mEq/day  MVI 10 mL/day  Trace 1 mL/day  Zinc 5 mg /day  Famotidine 20 mg/day  Cl:Ace=1: 2.4  Intralipid 20% 50 gram/day ([facility] only)      polyethylene glycol (MIRALAX) 17 GM/Dose powder Take 17 g by mouth as needed for constipation      pregabalin (LYRICA) 25 MG capsule Take 1 capsule (25 mg) by mouth at bedtime. 90 capsule 1    sucralfate (CARAFATE) 1 GM/10ML suspension Take 10 mLs (1 g) by mouth 4 times daily as needed for nausea. 414 mL 1    Syringe/Needle, Disp, (B-D ECLIPSE SYRINGE) 27G X 1/2\" 1 ML MISC 1 Device every 30 days. 30 each 1    vitamin D2 (ERGOCALCIFEROL) 81340 units (1250 mcg) capsule Take 1 capsule (50,000 Units) by mouth once a week. 12 capsule 3       Medical History: any changes in medical history since they were last seen? No    Social History:   Home situation:  to Vandana, one son in late 20's   Occupation/Schooling: he used to work at [employer]. He is on disability, SSDI  Tobacco use: smokes 1/2 ppd,  Alcohol use: none  Drug use: none  History of chemical dependency treatment: none        Physical Exam:  Vital signs: Blood pressure 116/74, pulse 70.    Behavioral observations:  Awake, alert, conversant and cooperative     Gait:  normal    Musculoskeletal exam:  strength grossly equal throughout  -large abdominal hernia    Neuro exam:  deferred    Skin/vascular/autonomic:  No suspicious lesions on exposed skin.     Other:  na            Diagnostic tests:  CT ABDOMEN PELVIS W CONTRAST  2/16/2019 3:00 AM      HISTORY: Right-sided abdominal pain, status post gastric bypass.  Patient has G-tube with blood and history of ulcers.     TECHNIQUE: CT abdomen and pelvis with 85 mL Isovue-370 IV. Radiation  dose for this scan was reduced using automated exposure control,  adjustment of the mA and/or kV according to patient size, or iterative  reconstruction technique.     COMPARISON: 1/13/2015.     FINDINGS:  Abdomen: There is mild dependent atelectasis at the lung bases. " The  heart size is normal. Small hiatal hernia. There is mild biliary  dilatation into the pancreas head which is probably normal post  cholecystectomy. The liver otherwise appears normal. The gallbladder  is absent. The spleen, pancreas, adrenal glands and kidneys are normal  in appearance. There is a G-tube in place. No abdominal or pelvic  lymph node enlargement.     Pelvis: The ascending colon and transverse colon are very distended  with gas, stool and fluid. The descending and rectosigmoid colon are  nondilated. Transition point is in the region of the splenic flexure,  but there is no convincing obstruction. Small bowel is normal in  caliber. The appendix is normal. There is no free intraperitoneal gas  or fluid.                                                                      IMPRESSION:  1. The ascending and transverse colon are distended with gas, stool  and fluid. Distal colon is nondilated. No focal obstruction is  evident.  2. Small hiatal hernia.     IMANI HU MD        MR CERVICAL SPINE WITHOUT CONTRAST  1/2/2017  2:44 PM     HISTORY: Injury to neck 2 weeks ago with development of suspected  radicular pain to the left upper extremity with weakness of thumb and  index finger.     COMPARISON: Plain films 12/22/2016.     TECHNIQUE: Routine MR cervical spine extended through T2.     FINDINGS: Vertebral body bone marrow signal is normal and the  alignment is normal through T2. No malignant or destructive changes.  The cervical and upper thoracic spinal cord appear intrinsically  normal through T2. Craniocervical junction region is normal. No  paraspinous soft tissue abnormality.     Findings by level as follows:     C2-C3: Negative. No disc protrusion. No central or lateral stenosis.     C3-C4: Negative.     C4-C5: Very tiny central disc protrusion. No significant central or  lateral stenosis.     C5-C6: Moderate degenerative narrowing of the interspace. Mild  broad-based disc osteophyte complex  and small uncinate spurs. Minimal  central stenosis and minimal bilateral foraminal stenosis.     C6-C7: Moderate degenerative narrowing of the interspace. No disc  protrusion. No central or lateral stenosis.     C7-T1: Negative.                                                                      IMPRESSION:  1. Degenerative changes as described, most marked at C5-C6 and C6-C7.  2. No intrinsic cord abnormality through T2.     MARTHA MCCARTY MD           MR LUMBAR SPINE W/O & W CONTRAST 1/6/2019 3:49 PM     Provided History: Back pain, > 6wks conservative tx, persistent sx;  pain in the left paraspinal region- now with fungemia, persistent  blood stream infections since Oct 2018.     Comparison: No similar studies     Technique: Sagittal T1-weighted and T2-weighted and axial T2-weighted  images of the lumbar spine were obtained without intravenous contrast.  Post intravenous contrast using gadolinium axial and sagittal  T1-weighted images were obtained with fat saturation.     Contrast: 8.9CC GADAVIST     Findings: Regarding numbering convention, there are 5 lumbar-type  vertebrae assumed for the purposes of this dictation.  The tip of the  conus medullaris is at L1.  Regarding alignment, the lumbar vertebral  column appears normally aligned.  There is no significant disc height  narrowing at any level.  Regarding bone marrow signal intensity, no  abnormality is visualized on STIR images.     On a level by level basis:     L1-2: No significant spinal canal or foraminal narrowing.     L2-3: Minimal disc bulge. Mild thickening of the ligamentum flavum. No  significant spinal canal or foraminal narrowing.     L3-4: Mild disc bulge and thickening of the ligamentum flavum with  mild spinal canal narrowing. No neural foraminal narrowing.     L4-5: Mild disc bulge and thickening of the ligamentum flavum. No  central spinal canal narrowing or neuroforaminal stenosis.      L5-S1: No significant spinal canal or foraminal  narrowing.     3 bandlike areas of high STIR signal and enhancement in the right  posterior paraspinous muscles.                                                                      Impression:  1. No abnormal signal within the spine to suggest fungal infection.  Mild nonspecific enhancement and STIR hyperintensity in the right  posterior paraspinous muscles, potentially myositis.  2. Multilevel lumbar spondylosis.     I have personally reviewed the examination and initial interpretation  and I agree with the findings.     YOLA TELLEZ MD           Other testing (labs, diagnostics):  1/10/2025  Cr. 0.64  Est         Screening tools:      DIRE Score for ongoing opioid management is calculated as follows:     Diagnosis = 2     Intractability = 2     Risk: Psych = 3  Chem Hlth = 2  Reliability = 2  Social = 2     Efficacy = 2     Total DIRE Score = 15 (14 or higher predicts good candidate for ongoing opioid management; 13 or lower predicts poor candidate for opioid management)         [place] Pres[place] Monitoring [place]:  [place]ed [place]  6/24/2025- no concerning fills.  Natalie MENARD RN CNP, FNP  [facility]  [place] location          Assessment:   Visceral hyperalgesia  Chronic abdominal pain  Jejunal intusseption  Chronic bilateral low back pain without sciatica  Chronic pain syndrome  Chronic continuous use of opioids  Encounter for long term use of opiate analgesic (current use)  CSA signed 6/24/2025  UDT 6/24/2025 wearing Fentanyl and oxycodone between 10-11 AM today  Long term current use of opiate analgesic  PMHx includes: ADHD, anxiety, cardiomyopathy and nutritional diseases, chronic abdominal pain, central line associated bloodstream infection recurrent, anesthesia complication, difficulty swallowing, gastric ulcer unspecified as acute or chronic without mention of hemorrhage perforation or obstruction, gastroesophageal reflux disease, head injury, hiatal  hernia, other bladder disorder, other chronic pain, postop nausea and vomiting, severe malnutrition on TPN, short gut syndrome, tobacco abuse  PSHx includes: Appendectomy, back surgery (11/3/2014), biopsy of cervical lymph node (2/20/2015), cholecystectomy, colonoscopy (2021, 2022), cervical anterior 1 level discectomy and fusion (2/15/2017), endoscopic insertion tube gastrostomy (9/9/2013), endoscopic ultrasound upper gastrointestinal tract (4/29/2011), endoscopic ultrasound upper gastrointestinal tract (9/9/2013), endoscopic ultrasound upper gastrointestinal tract (2/24/2021), endoscopy (3/25/2011, 8/4/2009, 1/5/2009), multiple EGDs, gastrectomy (6/22/2012), gastrojejunostomy (8/26/2009), umbilical hernia repair (2006), hernia raphe hiatal (6/22/2012), herniorrhaphy inguinal (11/22/2013), insert PICC line on the right (12/19/2019), insert PICC line right (2/21/2020), insert vascular access port on the right (12/19/2017, 8/2/2018), multiple interventional radiology CVC tunnel placement and removals, exploratory laparotomy (11/22/2013), orthopedic surgery, Celestino-en-Y gastric bypass (2003), tonsillectomy.        Plan:   Physical Therapy: none  Clinical Health Psychologist to address issues of relaxation, behavioral change, coping style, and other factors important to improvement: none  Continue gentle movement at home  Diagnostic Studies: none  Medication Management:   Continue fentanyl patch 25mcg/hr every 48 hours, fill 7/18 and start 7/21  Oxycodone liquid 5mg/5ml, take 15mg at bedtime (15m.) and may take 10mg (10ml) every 4 hours max of 3 doses per day of 10mg. Max of 45mg (45ml) per day.  Fill 7/18 and start 7/21  Restart pregabalin 25mg at bedtime  Reminded not to drink alcohol while on opiate meds   Will do refill of naloxone this fall since your current supply expires in October 2025  Further procedures recommended: none  Sign CSA today  UDT today, wearing fentanyl patch and last took oxycodone between 10-11  AM today. No alcohol in previous 5 days  Recommendations/follow-up for PCP:  See above  Release of information: none  Follow up: 3 months can be in-person or virtual  visit          ASSESSMENT AND PLAN:  (R19.8) Visceral hyperalgesia  (primary encounter diagnosis)  Comment:   Plan: fentaNYL (DURAGESIC) 25 mcg/hr 72 hr patch,         oxyCODONE (ROXICODONE) 5 MG/5ML solution, Adult        Pain Clinic Follow-Up Order, pregabalin         (LYRICA) 25 MG capsule            (R10.9,  G89.29) Chronic abdominal pain  Comment:   Plan: fentaNYL (DURAGESIC) 25 mcg/hr 72 hr patch,         oxyCODONE (ROXICODONE) 5 MG/5ML solution, Adult        Pain Clinic Follow-Up Order, pregabalin         (LYRICA) 25 MG capsule            (K56.1) Jejunal intussusception (H)  Comment:   Plan: Adult Pain Clinic Follow-Up Order            (M54.50,  G89.29) Chronic bilateral low back pain without sciatica  Comment:   Plan: fentaNYL (DURAGESIC) 25 mcg/hr 72 hr patch,         oxyCODONE (ROXICODONE) 5 MG/5ML solution, Adult        Pain Clinic Follow-Up Order, pregabalin         (LYRICA) 25 MG capsule            (G89.4) Chronic pain syndrome  Comment:   Plan: fentaNYL (DURAGESIC) 25 mcg/hr 72 hr patch,         oxyCODONE (ROXICODONE) 5 MG/5ML solution, Adult        Pain Clinic Follow-Up Order, pregabalin         (LYRICA) 25 MG capsule            (F11.90) Chronic, continuous use of opioids  Comment:   Plan: fentaNYL (DURAGESIC) 25 mcg/hr 72 hr patch,         oxyCODONE (ROXICODONE) 5 MG/5ML solution,         Ethanol urine, Ethyl Glucuronide Screen with         Reflex to Confirmation, Urine, Drug         Confirmation Panel Urine with Creatinine, Adult        Pain Clinic Follow-Up Order            (Z79.891) Encounter for long-term use of opiate analgesic  Comment:   Plan: Ethanol urine, Ethyl Glucuronide Screen with         Reflex to Confirmation, Urine, Drug         Confirmation Panel Urine with Creatinine, Adult        Pain Clinic Follow-Up Order               BILLING TIME DOCUMENTATION:   TOTAL TIME includes:   Time spent preparing to see the patient: 0 minutes (reviewing records and tests)  Time spend face to face with the patient: 16 minutes  Time spent ordering tests, medications, procedures and referrals: 0 minutes  Time spent Referring and communicating with other healthcare professionals: 0 minutes  Documenting clinical information in Epic: 2 minutes    The total TIME spent on this patient on the day of the appointment was 18 minutes.                  Natalie MENARD, RN CNP, FNP  [facility]

## 2025-06-24 NOTE — PATIENT INSTRUCTIONS
Plan:   Physical Therapy: none  Clinical Health Psychologist to address issues of relaxation, behavioral change, coping style, and other factors important to improvement: none  Continue gentle movement at home  Diagnostic Studies: none  Medication Management:   Continue fentanyl patch 25mcg/hr every 48 hours, fill 7/18 and start 7/21  Oxycodone liquid 5mg/5ml, take 15mg at bedtime (15m.) and may take 10mg (10ml) every 4 hours max of 3 doses per day of 10mg. Max of 45mg (45ml) per day.  Fill 7/18 and start 7/21  Restart pregabalin 25mg at bedtime  Reminded not to drink alcohol while on opiate meds   Will do refill of naloxone this fall since your current supply expires in October 2025  Further procedures recommended: none  Sign CSA today  UDT today, wearing fentanyl patch and last took oxycodone between 10-11 AM today. No alcohol in previous 5 days  Recommendations/follow-up for PCP:  See above  Release of information: none  Follow up: 3 months can be in-person or virtual  visit    =======================================    Clinic Number:  [phone]   Call with any questions about your care and for scheduling assistance.   Calls are returned Monday through Friday between 8 AM and 4:30 PM. We usually get back to you within 2 business days depending on the issue/request.    If we are prescribing your medications:  For opioid medication refills, call the clinic or send a [email or web address] message 7 days in advance.  Please include:  Name of requested medication  Name of the pharmacy.  For non-opioid medications, call your pharmacy directly to request a refill. Please allow 3-4 days to be processed.   Per [place] State Law:  All controlled substance prescriptions must be filled within 30 days of being written.    For those controlled substances allowing refills, pickup must occur within 30 days of last fill.      We believe regular attendance is key to your success in our program!    Any time you are unable to keep your appointment  we ask that you call us at least 24 hours in advance to cancel.This will allow us to offer the appointment time to another patient.   Multiple missed appointments may lead to dismissal from the clinic.

## 2025-06-25 ENCOUNTER — RESULTS FOLLOW-UP (OUTPATIENT)
Dept: PALLIATIVE MEDICINE | Facility: CLINIC | Age: 55
End: 2025-06-25

## 2025-06-25 LAB
CREAT UR-MCNC: 285 MG/DL
ETHANOL UR QL SCN: NORMAL
ETHYL GLUCURONIDE UR QL SCN: NEGATIVE NG/ML

## 2025-06-27 LAB
A-OH ALPRAZ UR QL CFM: PRESENT
ALPRAZ UR QL CFM: PRESENT
AMPHET UR CFM-MCNC: ABNORMAL NG/ML
AMPHET/CREAT UR: ABNORMAL
FENTANYL UR CFM-MCNC: 39 NG/ML
FENTANYL/CREAT UR: 14 NG/MG {CREAT}
NORFENTANYL UR CFM-MCNC: 163 NG/ML
NORFENTANYL/CREAT UR: 57 NG/MG {CREAT}
OXYCODONE UR CFM-MCNC: 4240 NG/ML
OXYCODONE/CREAT UR: 1488 NG/MG {CREAT}
OXYMORPHONE UR CFM-MCNC: >7000 NG/ML
OXYMORPHONE/CREAT UR: ABNORMAL

## 2025-07-02 DIAGNOSIS — Z98.84 S/P BARIATRIC SURGERY: ICD-10-CM

## 2025-07-06 RX ORDER — NYSTATIN 100000 U/G
CREAM TOPICAL
Qty: 30 G | Refills: 3 | Status: SHIPPED | OUTPATIENT
Start: 2025-07-06

## 2025-07-14 ENCOUNTER — MYC REFILL (OUTPATIENT)
Dept: INTERNAL MEDICINE | Facility: CLINIC | Age: 55
End: 2025-07-14
Payer: COMMERCIAL

## 2025-07-14 DIAGNOSIS — F90.0 ADHD (ATTENTION DEFICIT HYPERACTIVITY DISORDER), INATTENTIVE TYPE: ICD-10-CM

## 2025-07-14 DIAGNOSIS — Z98.84 S/P BARIATRIC SURGERY: ICD-10-CM

## 2025-07-14 DIAGNOSIS — F41.9 CHRONIC ANXIETY: ICD-10-CM

## 2025-07-14 RX ORDER — DEXTROAMPHETAMINE SACCHARATE, AMPHETAMINE ASPARTATE, DEXTROAMPHETAMINE SULFATE AND AMPHETAMINE SULFATE 5; 5; 5; 5 MG/1; MG/1; MG/1; MG/1
TABLET ORAL
Qty: 30 TABLET | Refills: 0 | Status: SHIPPED | OUTPATIENT
Start: 2025-07-14

## 2025-07-14 RX ORDER — ALPRAZOLAM 0.5 MG
TABLET ORAL
Qty: 60 TABLET | Refills: 0 | Status: SHIPPED | OUTPATIENT
Start: 2025-07-14

## 2025-07-14 RX ORDER — SUCRALFATE ORAL 1 G/10ML
1 SUSPENSION ORAL 4 TIMES DAILY PRN
Qty: 414 ML | Refills: 1 | Status: SHIPPED | OUTPATIENT
Start: 2025-07-14

## 2025-07-17 DIAGNOSIS — K90.9 INTESTINAL MALABSORPTION, UNSPECIFIED: ICD-10-CM

## 2025-07-17 DIAGNOSIS — R13.10 DYSPHAGIA, UNSPECIFIED TYPE: ICD-10-CM

## 2025-07-17 DIAGNOSIS — Z98.84 HISTORY OF ROUX-EN-Y GASTRIC BYPASS: ICD-10-CM

## 2025-07-17 DIAGNOSIS — E43 SEVERE PROTEIN-CALORIE MALNUTRITION: Primary | ICD-10-CM

## 2025-07-17 NOTE — PROGRESS NOTES
Patient called to state she has attended a support group and she was weighed (222 lb).    Task list updated.

## 2025-07-21 ENCOUNTER — MYC MEDICAL ADVICE (OUTPATIENT)
Dept: INTERNAL MEDICINE | Facility: CLINIC | Age: 55
End: 2025-07-21
Payer: COMMERCIAL

## 2025-07-21 ENCOUNTER — MYC REFILL (OUTPATIENT)
Dept: INTERNAL MEDICINE | Facility: CLINIC | Age: 55
End: 2025-07-21
Payer: COMMERCIAL

## 2025-07-21 DIAGNOSIS — R11.0 NAUSEA: ICD-10-CM

## 2025-07-21 RX ORDER — ONDANSETRON 8 MG/1
TABLET, ORALLY DISINTEGRATING ORAL
Qty: 90 TABLET | Refills: 0 | Status: SHIPPED | OUTPATIENT
Start: 2025-07-21

## 2025-07-21 RX ORDER — ONDANSETRON 8 MG/1
8 TABLET, ORALLY DISINTEGRATING ORAL EVERY 8 HOURS PRN
Qty: 90 TABLET | Refills: 1 | OUTPATIENT
Start: 2025-07-21

## 2025-07-28 SDOH — HEALTH STABILITY: PHYSICAL HEALTH: ON AVERAGE, HOW MANY DAYS PER WEEK DO YOU ENGAGE IN MODERATE TO STRENUOUS EXERCISE (LIKE A BRISK WALK)?: 3 DAYS

## 2025-07-28 SDOH — HEALTH STABILITY: PHYSICAL HEALTH: ON AVERAGE, HOW MANY MINUTES DO YOU ENGAGE IN EXERCISE AT THIS LEVEL?: 10 MIN

## 2025-07-28 ASSESSMENT — SOCIAL DETERMINANTS OF HEALTH (SDOH): HOW OFTEN DO YOU GET TOGETHER WITH FRIENDS OR RELATIVES?: NEVER

## 2025-07-29 ENCOUNTER — OFFICE VISIT (OUTPATIENT)
Dept: INTERNAL MEDICINE | Facility: CLINIC | Age: 55
End: 2025-07-29
Payer: COMMERCIAL

## 2025-07-29 VITALS
SYSTOLIC BLOOD PRESSURE: 114 MMHG | RESPIRATION RATE: 14 BRPM | HEART RATE: 78 BPM | HEIGHT: 69 IN | TEMPERATURE: 97.9 F | OXYGEN SATURATION: 96 % | BODY MASS INDEX: 31.39 KG/M2 | DIASTOLIC BLOOD PRESSURE: 72 MMHG | WEIGHT: 211.9 LBS

## 2025-07-29 DIAGNOSIS — E78.5 HYPERLIPIDEMIA LDL GOAL <130: ICD-10-CM

## 2025-07-29 DIAGNOSIS — F11.20 OPIOID DEPENDENCE, UNCOMPLICATED (H): ICD-10-CM

## 2025-07-29 DIAGNOSIS — Z98.84 S/P BARIATRIC SURGERY: ICD-10-CM

## 2025-07-29 DIAGNOSIS — L08.9 INFECTED ABRASION OF LEFT HAND, INITIAL ENCOUNTER: ICD-10-CM

## 2025-07-29 DIAGNOSIS — E43 SEVERE MALNUTRITION: ICD-10-CM

## 2025-07-29 DIAGNOSIS — Z93.1 GASTROSTOMY STATUS (H): ICD-10-CM

## 2025-07-29 DIAGNOSIS — S60.512A INFECTED ABRASION OF LEFT HAND, INITIAL ENCOUNTER: ICD-10-CM

## 2025-07-29 DIAGNOSIS — Z00.00 ENCOUNTER FOR MEDICARE ANNUAL WELLNESS EXAM: Primary | ICD-10-CM

## 2025-07-29 DIAGNOSIS — R11.0 NAUSEA: ICD-10-CM

## 2025-07-29 DIAGNOSIS — F90.0 ADHD (ATTENTION DEFICIT HYPERACTIVITY DISORDER), INATTENTIVE TYPE: ICD-10-CM

## 2025-07-29 DIAGNOSIS — E53.8 VITAMIN B12 DEFICIENCY (NON ANEMIC): ICD-10-CM

## 2025-07-29 LAB
ALBUMIN SERPL BCG-MCNC: 3.6 G/DL (ref 3.5–5.2)
ALP SERPL-CCNC: 98 U/L (ref 40–150)
ALT SERPL W P-5'-P-CCNC: 31 U/L (ref 0–70)
ANION GAP SERPL CALCULATED.3IONS-SCNC: 9 MMOL/L (ref 7–15)
AST SERPL W P-5'-P-CCNC: 34 U/L (ref 0–45)
BILIRUB SERPL-MCNC: 0.3 MG/DL
BUN SERPL-MCNC: 14 MG/DL (ref 6–20)
CALCIUM SERPL-MCNC: 8.6 MG/DL (ref 8.8–10.4)
CHLORIDE SERPL-SCNC: 105 MMOL/L (ref 98–107)
CHOLEST SERPL-MCNC: 119 MG/DL
CREAT SERPL-MCNC: 0.87 MG/DL (ref 0.67–1.17)
EGFRCR SERPLBLD CKD-EPI 2021: >90 ML/MIN/1.73M2
ERYTHROCYTE [DISTWIDTH] IN BLOOD BY AUTOMATED COUNT: 19.7 % (ref 10–15)
FASTING STATUS PATIENT QL REPORTED: NO
FASTING STATUS PATIENT QL REPORTED: NO
GLUCOSE SERPL-MCNC: 94 MG/DL (ref 70–99)
HCO3 SERPL-SCNC: 27 MMOL/L (ref 22–29)
HCT VFR BLD AUTO: 32.8 % (ref 40–53)
HDLC SERPL-MCNC: 47 MG/DL
HGB BLD-MCNC: 10.4 G/DL (ref 13.3–17.7)
IRON BINDING CAPACITY (ROCHE): 282 UG/DL (ref 240–430)
IRON SATN MFR SERPL: 8 % (ref 15–46)
IRON SERPL-MCNC: 23 UG/DL (ref 61–157)
LDLC SERPL CALC-MCNC: 52 MG/DL
MCH RBC QN AUTO: 28.6 PG (ref 26.5–33)
MCHC RBC AUTO-ENTMCNC: 31.7 G/DL (ref 31.5–36.5)
MCV RBC AUTO: 90 FL (ref 78–100)
NONHDLC SERPL-MCNC: 72 MG/DL
PLATELET # BLD AUTO: 225 10E3/UL (ref 150–450)
POTASSIUM SERPL-SCNC: 3.6 MMOL/L (ref 3.4–5.3)
PROT SERPL-MCNC: 6.8 G/DL (ref 6.4–8.3)
RBC # BLD AUTO: 3.64 10E6/UL (ref 4.4–5.9)
SODIUM SERPL-SCNC: 141 MMOL/L (ref 135–145)
TRIGL SERPL-MCNC: 100 MG/DL
TSH SERPL DL<=0.005 MIU/L-ACNC: 2.87 UIU/ML (ref 0.3–4.2)
VIT B12 SERPL-MCNC: 265 PG/ML (ref 232–1245)
WBC # BLD AUTO: 6.9 10E3/UL (ref 4–11)

## 2025-07-29 PROCEDURE — 36415 COLL VENOUS BLD VENIPUNCTURE: CPT | Performed by: INTERNAL MEDICINE

## 2025-07-29 RX ORDER — CEPHALEXIN 500 MG/1
500 CAPSULE ORAL 3 TIMES DAILY
Qty: 21 CAPSULE | Refills: 0 | Status: SHIPPED | OUTPATIENT
Start: 2025-07-29

## 2025-07-29 ASSESSMENT — PAIN SCALES - GENERAL: PAINLEVEL_OUTOF10: SEVERE PAIN (7)

## 2025-07-29 NOTE — PROGRESS NOTES
Preventive Care Visit  [facility]  Chuy Isaac MD, Internal Medicine  Jul 29, 2025      Assessment & Plan   Problem List Items Addressed This Visit       ADHD (attention deficit hyperactivity disorder), inattentive type    Relevant Orders    TSH with free T4 reflex    Gastrostomy status (H)    S/P bariatric surgery    Relevant Orders    Comprehensive metabolic panel (BMP + Alb, Alk Phos, ALT, AST, Total. Bili, TP)    CBC with platelets    Iron and iron binding capacity    TSH with free T4 reflex    Vitamin B12    Hyperlipidemia LDL goal <130    Relevant Orders    Lipid panel reflex to direct LDL Non-fasting    Opioid dependence, uncomplicated (H)     Other Visit Diagnoses         Encounter for Medicare annual wellness exam    -  Primary      Nausea          Vitamin B12 deficiency (non anemic)          Severe malnutrition        Relevant Orders    Comprehensive metabolic panel (BMP + Alb, Alk Phos, ALT, AST, Total. Bili, TP)    Iron and iron binding capacity    TSH with free T4 reflex      Infected abrasion of left hand, initial encounter        Relevant Medications    cephALEXin (KEFLEX) 500 MG capsule      Cardiomyopathy, unspecified type (H)               Patient is here for Medicare wellness exam he has not been in to see me for almost a year.  He is not interested in cancer screenings of PSA or colonoscopy or immunizations today.    Concern with his malnutrition, he has had gastric bypass which was difficult he has had a gastrostomy tube for drainage usually is living on TPN through a chronic central line but has had multiple infections and issues with home care.  He has no opportunity to get TPN any longer.  He has been without it for 2 to 3 months per his report.  His surgeon manages this and is trying to get for him.    Will try to check his electrolytes, B12, iron and CBC today.  I am worried he is getting more malnourished probably has a low albumin and therefore is  "getting swelling.  There is no good answer on how to get him better nutrition at this point unless he can get back on the TPN which seemed to help him.    Chronic pain he works with the pain clinic and he is on fentanyl oxycodone.  Recently had a drug screen    He does get Xanax and Adderall which she has had with his narcotics for years I will continue to prescribe those for him for ADHD and his anxiety.    Left hand cellulitis he is always at risk for infection with his line up in his left chest I will treat him for a cellulitis of the hand but this certainly is a risk for bacteremia which he has not had for 9 months.                  Patient has been advised of split billing requirements and indicates understanding: Yes    BMI  Estimated body mass index is 31.29 kg/m  as calculated from the following:    Height as of this encounter: 1.753 m (5' 9\").    Weight as of this encounter: 96.1 kg (211 lb 14.4 oz).   Trying to keep his appetite up and eat is much as possible with his previous gastric bypass surgery    Counseling  Appropriate preventive services were addressed with this patient via screening, questionnaire, or discussion as appropriate for fall prevention, nutrition, physical activity, Tobacco-use cessation, social engagement, weight loss and cognition.  Checklist reviewing preventive services available has been given to the patient.  Reviewed patient's diet, addressing concerns and/or questions.   He is at risk for lack of exercise and has been provided with information to increase physical activity for the benefit of his well-being.   Patient is at risk for social isolation and has been provided with information about the benefit of social connection.   The patient was instructed to see the dentist every 6 months.   Discussed possible causes of fatigue. I have reviewed Opioid Use Disorder and Substance Use Disorder risk factors and made any needed referrals.   Reviewed preventive health counseling, as " reflected in patient instructions       Regular exercise       Healthy diet/nutrition    Follow-up   No follow-ups on file.     Follow-up Visit   Expected date:  Aug 05, 2026 (Approximate)      Follow Up Appointment Details:     Follow-up with whom?: PCP    Follow-Up for what?: Medicare Wellness    Welcome or Annual?: Annual Wellness    How?: In Person                 Teodoro Canales is a 54 year old, presenting for the following:  Physical        7/29/2025    10:03 AM   Additional Questions   Roomed by Lore   Accompanied by Wife          HPI         Patient is here with his wife, they are currently living in [place], apparently were living with parents for a while and this affected his ability to get TPN as well.     feels tired, sleeps too much, says he is worse without the TPN.    Legs are scraped up from going in the trees.     Off TPN for 3 months, just eating barely.  Needs labs done. Weigh is up today maybe some fluid.    Can't get a company to take him and do TPN.      Left hand with two blisters and one opened up and now erythema.     Going to pain clinic, doing ok. Drug screen was ok,     Hernias get bigger at times, has seen surgery.      Previous clots but no more lovenox.        Advance Care Planning    Document on file is a Health Care Directive or POLST.        7/28/2025   General Health   How would you rate your overall physical health? (!) FAIR   Feel stress (tense, anxious, or unable to sleep) Not at all         7/28/2025   Nutrition   Diet: Other   If other, please elaborate: TPN         7/28/2025   Exercise   Days per week of moderate/strenous exercise 3 days   Average minutes spent exercising at this level 10 min         7/28/2025   Social Factors   Frequency of gathering with friends or relatives Never   Worry food won't last until get money to buy more No   Food not last or not have enough money for food? No   Do you have housing? (Housing is defined as stable permanent housing and does not  include staying outside in a car, in a tent, in an abandoned building, in an overnight shelter, or couch-surfing.) Yes   Are you worried about losing your housing? No   Lack of transportation? No   Unable to get utilities (heat,electricity)? No   (!) SOCIAL CONNECTIONS CONCERN      7/29/2025   Fall Risk   Reason Gait Speed Test Not Completed Patient declines   Reason for decline No concerns about walking/balance          7/28/2025   Activities of Daily Living- Home Safety   Needs help with the following daily activites None of the above   Safety concerns in the home None of the above         7/28/2025   Dental   Dentist two times every year? (!) NO         7/28/2025   Hearing Screening   Hearing concerns? None of the above         7/28/2025   Driving Risk Screening   Patient/family members have concerns about driving No         7/28/2025   General Alertness/Fatigue Screening   Have you been more tired than usual lately? (!) YES         7/28/2025   Urinary Incontinence Screening   Bothered by leaking urine in past 6 months No         Today's PHQ-2 Score:       1/2/2025     8:59 AM   PHQ-2 ( 1999 [employer])   Q1: Little interest or pleasure in doing things 0   Q2: Feeling down, depressed or hopeless 0   PHQ-2 Score 0    Q1: Little interest or pleasure in doing things Not at all   Q2: Feeling down, depressed or hopeless Not at all   PHQ-2 Score 0       Patient-reported         7/28/2025   Substance Use   Alcohol more than 3/day or more than 7/wk No   Do you have a current opioid prescription? (!) YES   How severe/bad is pain from 1 to 10? 6/10   Do you use any other substances recreationally? No       Social History     Tobacco Use    Smoking status: Light Smoker     Current packs/day: 0.50     Average packs/day: 0.5 packs/day for 3.0 years (1.5 ttl pk-yrs)     Types: Cigarettes    Smokeless tobacco: Never    Tobacco comments:     8/8/2024  smokes 3-5 cigarettes/day   Vaping Use    Vaping status: Never Used   Substance  Use Topics    Alcohol use: No     Comment: quit 2002    Drug use: No     ASCVD Risk   The ASCVD Risk score (Flavia DK, et al., 2019) failed to calculate for the following reasons:    Cannot find a previous HDL lab    Cannot find a previous total cholesterol lab  Reviewed and updated as needed this visit by Provider                    Lab work is in process  Current providers sharing in care for this patient include:  Patient Care Team:  Chuy Isaac MD as PCP - General (Internal Medicine)  Patti Milligan MD (Inactive) as MD (Pain Clinic)  Chuy Isaac MD as Assigned PCP  Natalie Hull APRN CNP as Assigned Neuroscience Provider  Beatriz Hancock RN as Registered Nurse  Natalie Hull APRN CNP as Assigned Pain Medication Provider  Eduardo Maynard MD as MD (Cardiovascular Disease)  Francis Vyas MD as Surgeon (Surgery)  Eduardo Maynard MD as MD (Cardiovascular Disease)  Francis Vyas MD as Assigned Surgical Provider  No Ref-Primary, Physician    The following health maintenance items are reviewed in Epic and correct as of today:  Health Maintenance   Topic Date Due    HEPATITIS A VACCINE (1 of 2 - Risk 2-dose series) Never done    HEPATITIS B VACCINE (1 of 3 - 19+ 3-dose series) Never done    MEDICARE ANNUAL WELLNESS VISIT  06/21/2017    LIPID  01/10/2020    PNEUMOCOCCAL VACCINE 50+ YEARS (3 of 3 - PCV20 or PCV21) 11/06/2022    ZOSTER VACCINE (1 of 2) Never done    LUNG CANCER SCREENING  02/26/2023    ANNUAL REVIEW OF HM ORDERS  03/11/2023    ADVANCE CARE PLANNING  10/11/2023    COLORECTAL CANCER SCREENING  09/19/2024    INFLUENZA VACCINE (1) 09/01/2025    NICOTINE/TOBACCO CESSATION COUNSELING Q 1 YR  12/16/2025    URINE DRUG SCREEN  06/24/2026    DIABETES SCREENING  12/16/2027    DTAP/TDAP/TD VACCINE (11 - Td or Tdap) 02/02/2033    HEPATITIS C SCREENING  Completed    HIV SCREENING  Completed    PHQ-2 (once per calendar year)  Completed    HPV  "VACCINE (No Doses Required) Completed    COVID-19 VACCINE  Completed    MENINGITIS VACCINE  Aged Out         Review of Systems  CONSTITUTIONAL:fatigue. Swelling   ENT/MOUTH: NEGATIVE for ear, mouth and throat problems  RESP: NEGATIVE for significant cough or SOB  CV: NEGATIVE for chest pain, palpitations or peripheral edema     Objective    Exam  /72 (BP Location: Left arm, Patient Position: Sitting, Cuff Size: Adult Regular)   Pulse 78   Temp 97.9  F (36.6  C) (Temporal)   Resp 14   Ht 1.753 m (5' 9\")   Wt 96.1 kg (211 lb 14.4 oz)   SpO2 96%   BMI 31.29 kg/m     Estimated body mass index is 31.29 kg/m  as calculated from the following:    Height as of this encounter: 1.753 m (5' 9\").    Weight as of this encounter: 96.1 kg (211 lb 14.4 oz).    Physical Exam  GENERAL: alert and no distress  EYES: Eyes grossly normal to inspection, PERRL and conjunctivae and sclerae normal  HENT: ear canals and TM's normal, nose and mouth without ulcers or lesions  NECK: no adenopathy, no asymmetry, masses, or scars  RESP: lungs clear to auscultation - no rales, rhonchi or wheezes  CV: regular rate and rhythm, normal S1 S2, no S3 or S4, no murmur, click or rub, no peripheral edema  ABDOMEN: Abdomen has multiple hernias from previous gastric surgeries  MS: Extremities have 2+ pitting edema bilaterally  SKIN: Skin has a multiple scratches and excoriations on his legs, dorsum of the left hand over the fifth knuckle has a blister on the fourth and some erythema on the fifth from a blister that was broken open  NEURO: Normal strength and tone, mentation intact and speech normal  Gait and balance assessed per Gait Speed Test.  Result as above.        7/29/2025   Mini Cog   Clock Draw Score 2 Normal   3 Item Recall 2 objects recalled   Mini Cog Total Score 4          Signed Electronically by: Chuy Isaac MD  "

## 2025-07-29 NOTE — PATIENT INSTRUCTIONS
"Patient Education   Preventive Care Advice   This is general advice we often give to help people stay healthy. Your care team may have specific advice just for you. Please talk to your care team about your own preventive care needs.  Lifestyle  Exercise at least 150 minutes each week (30 minutes a day, 5 days a week).  Do muscle strengthening activities 2 days a week. These help control your weight and prevent disease.  No smoking.  Wear sunscreen to prevent skin cancer.  Take time with family and friends.  Have your home tested for radon every 2 to 5 years. Radon is a colorless, odorless gas that can harm your lungs. To learn more, go to www.health.[facility].[place].us and search for \"Radon in Homes.\"  Keep guns unloaded and locked up in a safe place like a safe or gun vault, or, use a gun lock and hide the keys. Always lock away bullets separately. To learn more, visit [employer].[place].gov and search for \"safe gun storage.\"  Nutrition  Eat 5 or more servings of fruits and vegetables each day.  Try wheat bread, brown rice and whole grain pasta (instead of white bread, rice, and pasta).  Get enough calcium and vitamin D. Check the label on foods and aim for 100% of the RDA (recommended daily allowance).  Regular exams  Have a dental exam and cleaning every 6 months.  Older adults: Ask your care team how often to have memory testing.  See your health care team every year to talk about:  Any changes in your health.  Any medicines your care team has prescribed.  Preventive care, family planning, and ways to prevent chronic diseases.  Shots (vaccines)   HPV shots (up to age 26), if you've never had them before.  Hepatitis B shots (up to age 59), if you've never had them before.  COVID-19 shot: Get this shot when it's due.  Flu shot: Get a flu shot every year.  Tetanus shot: Get a tetanus shot every 10 years.  Pneumococcal, hepatitis A, and RSV shots: Ask your care team if you need these based on your risk.  Shingles shot (for age 50 and " up).  General health tests  Diabetes screening:  Starting at age 35, Get screened for diabetes at least every 3 years.  If you are younger than age 35, ask your care team if you should be screened for diabetes.  Cholesterol test: At age 39, start having a cholesterol test every 5 years, or more often if advised.  Bone density scan (DEXA): At age 50, ask your care team if you should have this scan for osteoporosis (brittle bones).  Hepatitis C: Get tested at least once in your life.  Abdominal aortic aneurysm screening: Talk to your doctor about having this screening if you:  Have ever smoked; and  Are biologically male; and  Are between the ages of 65 and 75.  STIs (sexually transmitted infections)  Before age 24: Ask your care team if you should be screened for STIs.  After age 24: Get screened for STIs if you're at risk. You are at risk for STIs (including HIV) if:  You are sexually active with more than one person.  You don't use condoms every time.  You or a partner was diagnosed with a sexually transmitted infection.  If you are at risk for HIV, ask about PrEP medicine to prevent HIV.  Get tested for HIV at least once in your life, whether you are at risk for HIV or not.  Cancer screening tests  Cervical cancer screening: If you have a cervix, begin getting regular cervical cancer screening tests at age 21. Most people who have regular screenings with normal results can stop after age 65. Talk about this with your provider.  Breast cancer scan (mammogram): If you've ever had breasts, begin having regular mammograms starting at age 40. This is a scan to check for breast cancer.  Colon cancer screening: It is important to start screening for colon cancer at age 45.  Have a colonoscopy test every 10 years (or more often if you're at risk) Or, ask your provider about stool tests like a FIT test every year or Cologuard test every 3 years.  To learn more about your testing options, visit:  [email or web address].  For help making a decision, visit: aundrea.[email or web address]/[email or web address].  Prostate cancer screening test: If you have a prostate and are age 55 to 69, ask your provider if you would benefit from a yearly prostate cancer screening test.  Lung cancer screening: If you are a current or former smoker age 50 to 80, ask your care team if ongoing lung cancer screenings are right for you.    For informational purposes only. Not to replace the advice of your health care provider. Copyright   2023 [employer]. All rights reserved. Clinically reviewed by the  [employer] [facility]. [employer] 527751 - REV 6/25.  Preventing Falls: Care Instructions  Injuries and health problems such as trouble walking or poor eyesight can increase your risk of falling. So can some medicines. But there are things you can do to help prevent falls. You can exercise to get stronger. You can also arrange your home to make it safer.    Talk to your doctor about the medicines you take. Ask if any of them increase the risk of falls and whether they can be changed or stopped.   Try to exercise regularly. It can help improve your strength and balance. This can help lower your risk of falling.         Practice fall safety and prevention.   Wear low-heeled shoes that fit well and give your feet good support. Talk to your doctor if you have foot problems that make this hard.  Carry a cellphone or wear a medical alert device that you can use to call for help.  Use stepladders instead of chairs to reach high objects. Don't climb if you're at risk for falls. Ask for help, if needed.  Wear the correct eyeglasses, if you need them.        Make your home safer.   Remove rugs, cords, clutter, and furniture from walkways.  Keep your house well lit. Use night-lights in hallways and bathrooms.  Install and use sturdy handrails on stairways.  Wear nonskid footwear, even inside. Don't walk barefoot or in socks without shoes.        Be  "safe outside.   Use handrails, curb cuts, and ramps whenever possible.  Keep your hands free by using a shoulder bag or backpack.  Try to walk in well-lit areas. Watch out for uneven ground, changes in pavement, and debris.  Be careful in the winter. Walk on the grass or gravel when sidewalks are slippery. Use de-icer on steps and walkways. Add non-slip devices to shoes.    Put grab bars and nonskid mats in your shower or tub and near the toilet. Try to use a shower chair or bath bench when bathing.   Get into a tub or shower by putting in your weaker leg first. Get out with your strong side first. Have a phone or medical alert device in the bathroom with you.   Where can you learn more?  Go to https://www.Meet.com.net/patiented  Enter G117 in the search box to learn more about \"Preventing Falls: Care Instructions.\"  Current as of: July 31, 2024  Content Version: 14.5 2024-2025 [employer].   Care instructions adapted under license by your healthcare professional. If you have questions about a medical condition or this instruction, always ask your healthcare professional. [employer] disclaims any warranty or liability for your use of this information.    Relationships for Good Health  Relationships are important for our health and happiness. Social isolation, loneliness and lack of support are bad for your health. Studies show that loneliness can harm health and limit your life span as much as high blood pressure and smoking.   Take some time to reflect on your relationships. Then answer these questions:  Are there people in your life that cause you stress or drain your energy? What can you do to set " limits?  ________________________________________________________________________________________________________________________________________________________________________________________________________________________________________________________________________________________________________________________________________________  Who do you enjoy spending time with? Who can you go to for support?  ________________________________________________________________________________________________________________________________________________________________________________________________________________________________________________________________________________________________________________________________________________  What can you do to improve your relationships with others?  __________________________________________________________________________________________________________________________________________________________________________________________________________________  ______________________________________________________________________________________________________________________________  What do you like most about your relationships with others?  ________________________________________________________________________________________________________________________________________________________________________________________________________________________________________________________________________________________________________________________________________________  My goal: ______________________________________________________________________  I will: ______________________________________________________________________________________________________________________________________________________________________________________________    For informational purposes only. Not to replace the advice of your health care provider. Copyright   2018  [facility]. All rights reserved. Clinically reviewed by [facility]  Team. [employer] 746101 - Rev 06/24.  Learning About Sleeping Well  What does sleeping well mean?     Sleeping well means getting enough sleep to feel good and stay healthy. How much sleep is enough varies among people.  The number of hours you sleep and how you feel when you wake up are both important. If you do not feel refreshed, you probably need more sleep. Another sign of not getting enough sleep is feeling tired during the day.  Experts recommend that adults get at least 7 or more hours of sleep per day. Children and older adults need more sleep.  Why is getting enough sleep important?  Getting enough quality sleep is a basic part of good health. When your sleep suffers, your physical health, mood, and your thoughts can suffer too. You may find yourself feeling more grumpy or stressed. Not getting enough sleep also can lead to serious problems, including injury, accidents, anxiety, and depression.  What might cause poor sleeping?  Many things can cause sleep problems, including:  Changes to your sleep schedule.  Stress. Stress can be caused by fear about a single event, such as giving a speech. Or you may have ongoing stress, such as worry about work or school.  Depression, anxiety, and other mental or emotional conditions.  Changes in your sleep habits or surroundings. This includes changes that happen where you sleep, such as noise, light, or sleeping in a different bed. It also includes changes in your sleep pattern, such as having jet lag or working a late shift.  Health problems, such as pain, breathing problems, and restless legs syndrome.  Lack of regular exercise.  Using alcohol, nicotine, or caffeine before bed.  How can you help yourself?  Here are some tips that may help you sleep more soundly and wake up feeling more refreshed.  Your sleeping area   Use your bedroom only for sleeping and sex. A bit of  "light reading may help you fall asleep. But if it doesn't, do your reading elsewhere in the house. Try not to use your TV, computer, smartphone, or tablet while you are in bed.  Be sure your bed is big enough to stretch out comfortably, especially if you have a sleep partner.  Keep your bedroom quiet, dark, and cool. Use curtains, blinds, or a sleep mask to block out light. To block out noise, use earplugs, soothing music, or a \"white noise\" machine.  Your evening and bedtime routine   Create a relaxing bedtime routine. You might want to take a warm shower or bath, or listen to soothing music.  Go to bed at the same time every night. And get up at the same time every morning, even if you feel tired.  What to avoid   Limit caffeine (coffee, tea, caffeinated sodas) during the day, and don't have any for at least 6 hours before bedtime.  Avoid drinking alcohol before bedtime. Alcohol can cause you to wake up more often during the night.  Try not to smoke or use tobacco, especially in the evening. Nicotine can keep you awake.  Limit naps during the day, especially close to bedtime.  Avoid lying in bed awake for too long. If you can't fall asleep or if you wake up in the middle of the night and can't get back to sleep within about 20 minutes, get out of bed and go to another room until you feel sleepy.  Avoid taking medicine right before bed that may keep you awake or make you feel hyper or energized. Your doctor can tell you if your medicine may do this and if you can take it earlier in the day.  If you can't sleep   Imagine yourself in a peaceful, pleasant scene. Focus on the details and feelings of being in a place that is relaxing.  Get up and do a quiet or boring activity until you feel sleepy.  Avoid drinking any liquids before going to bed to help prevent waking up often to use the bathroom.  Where can you learn more?  Go to https://www.healthwise.net/patiented  Enter J942 in the search box to learn more about " "\"Learning About Sleeping Well.\"  Current as of: July 31, 2024  Content Version: 14.5    6009-8512 [employer].   Care instructions adapted under license by your healthcare professional. If you have questions about a medical condition or this instruction, always ask your healthcare professional. [employer] disclaims any warranty or liability for your use of this information.    Chronic Pain: Care Instructions  Your Care Instructions     Chronic pain is pain that lasts a long time (months or even years) and may or may not have a clear cause. It is different from acute pain, which usually does have a clear cause--like an injury or illness--and gets better over time. Chronic pain:  Lasts over time but may vary from day to day.  Does not go away despite efforts to end it.  May disrupt your sleep and lead to fatigue.  May cause depression or anxiety.  May make your muscles tense, causing more pain.  Can disrupt your work, hobbies, home life, and relationships with friends and family.  Chronic pain is a very real condition. It is not just in your head. Treatment can help and usually includes several methods used together, such as medicines, physical therapy, exercise, and other treatments. Learning how to relax and changing negative thought patterns can also help you cope.  Chronic pain is complex. Taking an active role in your treatment will help you better manage your pain. Tell your doctor if you have trouble dealing with your pain. You may have to try several things before you find what works best for you.  Follow-up care is a key part of your treatment and safety. Be sure to make and go to all appointments, and call your doctor if you are having problems. It's also a good idea to know your test results and keep a list of the medicines you take.  How can you care for yourself at home?  Pace yourself. Break up large jobs into smaller tasks. Save harder tasks for days when you have less pain, or go " back and forth between hard tasks and easier ones. Take rest breaks.  Relax, and reduce stress. Relaxation techniques such as deep breathing or meditation can help.  Keep moving. Gentle, daily exercise can help reduce pain over the long run. Try low- or no-impact exercises such as walking, swimming, and stationary biking. Do stretches to stay flexible.  Try heat, cold packs, and massage.  Get enough sleep. Chronic pain can make you tired and drain your energy. Talk with your doctor if you have trouble sleeping because of pain.  Think positive. Your thoughts can affect your pain level. Do things that you enjoy to distract yourself when you have pain instead of focusing on the pain. See a movie, read a book, listen to music, or spend time with a friend.  If you think you are depressed, talk to your doctor about treatment.  Keep a daily pain diary. Record how your moods, thoughts, sleep patterns, activities, and medicine affect your pain. You may find that your pain is worse during or after certain activities or when you are feeling a certain emotion. Having a record of your pain can help you and your doctor find the best ways to treat your pain.  Take pain medicines exactly as directed.  If the doctor gave you a prescription medicine for pain, take it as prescribed.  If you are not taking a prescription pain medicine, ask your doctor if you can take an over-the-counter medicine.  Reducing constipation caused by pain medicine  Talk to your doctor about a laxative. If a laxative doesn't work, your doctor may suggest a prescription medicine.  Include fruits, vegetables, beans, and whole grains in your diet each day. These foods are high in fiber.  If your doctor recommends it, get more exercise. Walking is a good choice. Bit by bit, increase the amount you walk every day. Try for at least 30 minutes on most days of the week.  Schedule time each day for a bowel movement. A daily routine may help. Take your time and do not  "strain when having a bowel movement.  When should you call for help?   Call your doctor now or seek immediate medical care if:    Your pain gets worse or is out of control.     You feel down or blue, or you do not enjoy things like you once did. You may be depressed, which is common in people with chronic pain. Depression can be treated.     You have vomiting or cramps for more than 2 hours.   Watch closely for changes in your health, and be sure to contact your doctor if:    You cannot sleep because of pain.     You are very worried or anxious about your pain.     You have trouble taking your pain medicine.     You have any concerns about your pain medicine.     You have trouble with bowel movements, such as:  No bowel movement in 3 days.  Blood in the anal area, in your stool, or on the toilet paper.  Diarrhea for more than 24 hours.   Where can you learn more?  Go to https://www.[employer].net/patiented  Enter N004 in the search box to learn more about \"Chronic Pain: Care Instructions.\"  Current as of: July 31, 2024  Content Version: 14.5    [id number] [employer].   Care instructions adapted under license by your healthcare professional. If you have questions about a medical condition or this instruction, always ask your healthcare professional. [employer] disclaims any warranty or liability for your use of this information.       "

## 2025-08-04 ENCOUNTER — MYC REFILL (OUTPATIENT)
Dept: PALLIATIVE MEDICINE | Facility: CLINIC | Age: 55
End: 2025-08-04
Payer: COMMERCIAL

## 2025-08-04 ENCOUNTER — MYC MEDICAL ADVICE (OUTPATIENT)
Dept: INTERNAL MEDICINE | Facility: CLINIC | Age: 55
End: 2025-08-04
Payer: COMMERCIAL

## 2025-08-04 DIAGNOSIS — E86.0 DEHYDRATION: ICD-10-CM

## 2025-08-04 DIAGNOSIS — R19.8 VISCERAL HYPERALGESIA: ICD-10-CM

## 2025-08-04 DIAGNOSIS — G89.29 CHRONIC ABDOMINAL PAIN: ICD-10-CM

## 2025-08-04 DIAGNOSIS — M54.50 CHRONIC BILATERAL LOW BACK PAIN WITHOUT SCIATICA: ICD-10-CM

## 2025-08-04 DIAGNOSIS — D50.8 IRON DEFICIENCY ANEMIA SECONDARY TO INADEQUATE DIETARY IRON INTAKE: Primary | ICD-10-CM

## 2025-08-04 DIAGNOSIS — R10.9 CHRONIC ABDOMINAL PAIN: ICD-10-CM

## 2025-08-04 DIAGNOSIS — G89.29 CHRONIC BILATERAL LOW BACK PAIN WITHOUT SCIATICA: ICD-10-CM

## 2025-08-04 DIAGNOSIS — E61.1 IRON DEFICIENCY: ICD-10-CM

## 2025-08-04 DIAGNOSIS — F11.90 CHRONIC, CONTINUOUS USE OF OPIOIDS: ICD-10-CM

## 2025-08-04 DIAGNOSIS — E46 MALNUTRITION, UNSPECIFIED TYPE: ICD-10-CM

## 2025-08-04 DIAGNOSIS — G89.4 CHRONIC PAIN SYNDROME: ICD-10-CM

## 2025-08-04 RX ORDER — FENTANYL 25 UG/1
1 PATCH TRANSDERMAL
Qty: 15 PATCH | Refills: 0 | Status: ON HOLD | OUTPATIENT
Start: 2025-08-04

## 2025-08-04 RX ORDER — OXYCODONE HCL 5 MG/5 ML
SOLUTION, ORAL ORAL
Qty: 1350 ML | Refills: 0 | Status: ON HOLD | OUTPATIENT
Start: 2025-08-04

## 2025-08-06 ENCOUNTER — HOSPITAL ENCOUNTER (INPATIENT)
Facility: CLINIC | Age: 55
End: 2025-08-06
Attending: STUDENT IN AN ORGANIZED HEALTH CARE EDUCATION/TRAINING PROGRAM
Payer: COMMERCIAL

## 2025-08-06 DIAGNOSIS — R93.1 ABNORMAL ECHOCARDIOGRAM: ICD-10-CM

## 2025-08-06 DIAGNOSIS — F17.210 CIGARETTE NICOTINE DEPENDENCE WITHOUT COMPLICATION: ICD-10-CM

## 2025-08-06 DIAGNOSIS — R63.4 UNINTENTIONAL WEIGHT LOSS: ICD-10-CM

## 2025-08-06 DIAGNOSIS — R62.7 FAILURE TO THRIVE IN ADULT: Primary | ICD-10-CM

## 2025-08-06 DIAGNOSIS — G47.00 INSOMNIA, UNSPECIFIED TYPE: ICD-10-CM

## 2025-08-06 DIAGNOSIS — R53.83 OTHER FATIGUE: ICD-10-CM

## 2025-08-06 DIAGNOSIS — K90.49 FOOD INTOLERANCE: ICD-10-CM

## 2025-08-06 LAB
ALBUMIN SERPL BCG-MCNC: 3.8 G/DL (ref 3.5–5.2)
ALBUMIN UR-MCNC: 10 MG/DL
ALP SERPL-CCNC: 99 U/L (ref 40–150)
ALT SERPL W P-5'-P-CCNC: 14 U/L (ref 0–70)
ANION GAP SERPL CALCULATED.3IONS-SCNC: 12 MMOL/L (ref 7–15)
APPEARANCE UR: CLEAR
AST SERPL W P-5'-P-CCNC: 20 U/L (ref 0–45)
BASOPHILS # BLD AUTO: 0.1 10E3/UL (ref 0–0.2)
BASOPHILS NFR BLD AUTO: 1 %
BILIRUB SERPL-MCNC: 0.2 MG/DL
BILIRUB UR QL STRIP: NEGATIVE
BILIRUBIN DIRECT (ROCHE PRO & PURE): 0.08 MG/DL (ref 0–0.45)
BUN SERPL-MCNC: 10.1 MG/DL (ref 6–20)
CALCIUM SERPL-MCNC: 8.8 MG/DL (ref 8.8–10.4)
CHLORIDE SERPL-SCNC: 107 MMOL/L (ref 98–107)
COLOR UR AUTO: YELLOW
CREAT SERPL-MCNC: 0.87 MG/DL (ref 0.67–1.17)
EGFRCR SERPLBLD CKD-EPI 2021: >90 ML/MIN/1.73M2
EOSINOPHIL # BLD AUTO: 0.3 10E3/UL (ref 0–0.7)
EOSINOPHIL NFR BLD AUTO: 3 %
ERYTHROCYTE [DISTWIDTH] IN BLOOD BY AUTOMATED COUNT: 18.2 % (ref 10–15)
GLUCOSE SERPL-MCNC: 97 MG/DL (ref 70–99)
GLUCOSE UR STRIP-MCNC: NEGATIVE MG/DL
HCO3 SERPL-SCNC: 24 MMOL/L (ref 22–29)
HCT VFR BLD AUTO: 40.5 % (ref 40–53)
HGB BLD-MCNC: 12.6 G/DL (ref 13.3–17.7)
HGB UR QL STRIP: NEGATIVE
IMM GRANULOCYTES # BLD: 0 10E3/UL
IMM GRANULOCYTES NFR BLD: 0 %
KETONES UR STRIP-MCNC: NEGATIVE MG/DL
LACTATE SERPL-SCNC: 1.2 MMOL/L (ref 0.7–2)
LEUKOCYTE ESTERASE UR QL STRIP: NEGATIVE
LYMPHOCYTES # BLD AUTO: 2.9 10E3/UL (ref 0.8–5.3)
LYMPHOCYTES NFR BLD AUTO: 32 %
MAGNESIUM SERPL-MCNC: 1.9 MG/DL (ref 1.7–2.3)
MCH RBC QN AUTO: 28 PG (ref 26.5–33)
MCHC RBC AUTO-ENTMCNC: 31.1 G/DL (ref 31.5–36.5)
MCV RBC AUTO: 90 FL (ref 78–100)
MONOCYTES # BLD AUTO: 1 10E3/UL (ref 0–1.3)
MONOCYTES NFR BLD AUTO: 11 %
MUCOUS THREADS #/AREA URNS LPF: PRESENT /LPF
NEUTROPHILS # BLD AUTO: 4.7 10E3/UL (ref 1.6–8.3)
NEUTROPHILS NFR BLD AUTO: 53 %
NITRATE UR QL: NEGATIVE
NRBC # BLD AUTO: 0 10E3/UL
NRBC BLD AUTO-RTO: 0 /100
PH UR STRIP: 6.5 [PH] (ref 5–7)
PHOSPHATE SERPL-MCNC: 3.5 MG/DL (ref 2.5–4.5)
PLATELET # BLD AUTO: 285 10E3/UL (ref 150–450)
POTASSIUM SERPL-SCNC: 3.9 MMOL/L (ref 3.4–5.3)
PREALB SERPL-MCNC: 18.9 MG/DL (ref 20–40)
PROT SERPL-MCNC: 7 G/DL (ref 6.4–8.3)
RBC # BLD AUTO: 4.5 10E6/UL (ref 4.4–5.9)
RBC URINE: 4 /HPF
SODIUM SERPL-SCNC: 143 MMOL/L (ref 135–145)
SP GR UR STRIP: 1.03 (ref 1–1.03)
UROBILINOGEN UR STRIP-MCNC: 3 MG/DL
WBC # BLD AUTO: 9 10E3/UL (ref 4–11)
WBC URINE: 1 /HPF

## 2025-08-06 PROCEDURE — 81003 URINALYSIS AUTO W/O SCOPE: CPT | Performed by: STUDENT IN AN ORGANIZED HEALTH CARE EDUCATION/TRAINING PROGRAM

## 2025-08-06 PROCEDURE — 99285 EMERGENCY DEPT VISIT HI MDM: CPT | Mod: 25 | Performed by: STUDENT IN AN ORGANIZED HEALTH CARE EDUCATION/TRAINING PROGRAM

## 2025-08-06 PROCEDURE — G0378 HOSPITAL OBSERVATION PER HR: HCPCS

## 2025-08-06 PROCEDURE — 96360 HYDRATION IV INFUSION INIT: CPT | Performed by: STUDENT IN AN ORGANIZED HEALTH CARE EDUCATION/TRAINING PROGRAM

## 2025-08-06 PROCEDURE — 83605 ASSAY OF LACTIC ACID: CPT | Performed by: STUDENT IN AN ORGANIZED HEALTH CARE EDUCATION/TRAINING PROGRAM

## 2025-08-06 PROCEDURE — 84134 ASSAY OF PREALBUMIN: CPT | Performed by: STUDENT IN AN ORGANIZED HEALTH CARE EDUCATION/TRAINING PROGRAM

## 2025-08-06 PROCEDURE — 83735 ASSAY OF MAGNESIUM: CPT | Performed by: STUDENT IN AN ORGANIZED HEALTH CARE EDUCATION/TRAINING PROGRAM

## 2025-08-06 PROCEDURE — 84100 ASSAY OF PHOSPHORUS: CPT | Performed by: STUDENT IN AN ORGANIZED HEALTH CARE EDUCATION/TRAINING PROGRAM

## 2025-08-06 PROCEDURE — 36415 COLL VENOUS BLD VENIPUNCTURE: CPT | Performed by: STUDENT IN AN ORGANIZED HEALTH CARE EDUCATION/TRAINING PROGRAM

## 2025-08-06 PROCEDURE — 258N000003 HC RX IP 258 OP 636: Performed by: STUDENT IN AN ORGANIZED HEALTH CARE EDUCATION/TRAINING PROGRAM

## 2025-08-06 PROCEDURE — 82248 BILIRUBIN DIRECT: CPT | Performed by: STUDENT IN AN ORGANIZED HEALTH CARE EDUCATION/TRAINING PROGRAM

## 2025-08-06 PROCEDURE — 85004 AUTOMATED DIFF WBC COUNT: CPT | Performed by: STUDENT IN AN ORGANIZED HEALTH CARE EDUCATION/TRAINING PROGRAM

## 2025-08-06 PROCEDURE — 80048 BASIC METABOLIC PNL TOTAL CA: CPT | Performed by: STUDENT IN AN ORGANIZED HEALTH CARE EDUCATION/TRAINING PROGRAM

## 2025-08-06 PROCEDURE — 99285 EMERGENCY DEPT VISIT HI MDM: CPT | Performed by: STUDENT IN AN ORGANIZED HEALTH CARE EDUCATION/TRAINING PROGRAM

## 2025-08-06 PROCEDURE — 96361 HYDRATE IV INFUSION ADD-ON: CPT | Performed by: STUDENT IN AN ORGANIZED HEALTH CARE EDUCATION/TRAINING PROGRAM

## 2025-08-06 RX ORDER — SODIUM CHLORIDE, SODIUM LACTATE, POTASSIUM CHLORIDE, CALCIUM CHLORIDE 600; 310; 30; 20 MG/100ML; MG/100ML; MG/100ML; MG/100ML
INJECTION, SOLUTION INTRAVENOUS CONTINUOUS
Status: DISCONTINUED | OUTPATIENT
Start: 2025-08-06 | End: 2025-08-07

## 2025-08-06 RX ADMIN — SODIUM CHLORIDE, SODIUM LACTATE, POTASSIUM CHLORIDE, AND CALCIUM CHLORIDE: .6; .31; .03; .02 INJECTION, SOLUTION INTRAVENOUS at 21:40

## 2025-08-06 ASSESSMENT — ACTIVITIES OF DAILY LIVING (ADL)
ADLS_ACUITY_SCORE: 58
ADLS_ACUITY_SCORE: 58

## 2025-08-06 ASSESSMENT — COLUMBIA-SUICIDE SEVERITY RATING SCALE - C-SSRS
2. HAVE YOU ACTUALLY HAD ANY THOUGHTS OF KILLING YOURSELF IN THE PAST MONTH?: NO
6. HAVE YOU EVER DONE ANYTHING, STARTED TO DO ANYTHING, OR PREPARED TO DO ANYTHING TO END YOUR LIFE?: NO
1. IN THE PAST MONTH, HAVE YOU WISHED YOU WERE DEAD OR WISHED YOU COULD GO TO SLEEP AND NOT WAKE UP?: NO

## 2025-08-07 ENCOUNTER — ENROLLMENT (OUTPATIENT)
Dept: HOME HEALTH SERVICES | Facility: HOME HEALTH | Age: 55
End: 2025-08-07
Payer: COMMERCIAL

## 2025-08-07 VITALS
TEMPERATURE: 97.7 F | RESPIRATION RATE: 16 BRPM | OXYGEN SATURATION: 100 % | SYSTOLIC BLOOD PRESSURE: 112 MMHG | HEART RATE: 56 BPM | BODY MASS INDEX: 27.92 KG/M2 | DIASTOLIC BLOOD PRESSURE: 83 MMHG | HEIGHT: 70 IN | WEIGHT: 195 LBS

## 2025-08-07 LAB
ALBUMIN SERPL BCG-MCNC: 3.5 G/DL (ref 3.5–5.2)
ALP SERPL-CCNC: 89 U/L (ref 40–150)
ALT SERPL W P-5'-P-CCNC: 13 U/L (ref 0–70)
ANION GAP SERPL CALCULATED.3IONS-SCNC: 11 MMOL/L (ref 7–15)
AST SERPL W P-5'-P-CCNC: 17 U/L (ref 0–45)
BILIRUB SERPL-MCNC: 0.3 MG/DL
BUN SERPL-MCNC: 9.8 MG/DL (ref 6–20)
CALCIUM SERPL-MCNC: 8.6 MG/DL (ref 8.8–10.4)
CHLORIDE SERPL-SCNC: 107 MMOL/L (ref 98–107)
CREAT SERPL-MCNC: 0.74 MG/DL (ref 0.67–1.17)
EGFRCR SERPLBLD CKD-EPI 2021: >90 ML/MIN/1.73M2
ERYTHROCYTE [DISTWIDTH] IN BLOOD BY AUTOMATED COUNT: 18.4 % (ref 10–15)
GLUCOSE BLDC GLUCOMTR-MCNC: 80 MG/DL (ref 70–99)
GLUCOSE BLDC GLUCOMTR-MCNC: 92 MG/DL (ref 70–99)
GLUCOSE SERPL-MCNC: 89 MG/DL (ref 70–99)
HCO3 SERPL-SCNC: 25 MMOL/L (ref 22–29)
HCT VFR BLD AUTO: 36.6 % (ref 40–53)
HGB BLD-MCNC: 11.2 G/DL (ref 13.3–17.7)
MAGNESIUM SERPL-MCNC: 1.8 MG/DL (ref 1.7–2.3)
MCH RBC QN AUTO: 27.8 PG (ref 26.5–33)
MCHC RBC AUTO-ENTMCNC: 30.6 G/DL (ref 31.5–36.5)
MCV RBC AUTO: 91 FL (ref 78–100)
PHOSPHATE SERPL-MCNC: 3.9 MG/DL (ref 2.5–4.5)
PLATELET # BLD AUTO: 252 10E3/UL (ref 150–450)
POTASSIUM SERPL-SCNC: 3.5 MMOL/L (ref 3.4–5.3)
PROT SERPL-MCNC: 6.3 G/DL (ref 6.4–8.3)
RBC # BLD AUTO: 4.03 10E6/UL (ref 4.4–5.9)
SODIUM SERPL-SCNC: 143 MMOL/L (ref 135–145)
VIT D+METAB SERPL-MCNC: 25 NG/ML (ref 20–50)
WBC # BLD AUTO: 8.6 10E3/UL (ref 4–11)

## 2025-08-07 PROCEDURE — 82310 ASSAY OF CALCIUM: CPT

## 2025-08-07 PROCEDURE — 250N000009 HC RX 250

## 2025-08-07 PROCEDURE — 85027 COMPLETE CBC AUTOMATED: CPT

## 2025-08-07 PROCEDURE — 99418 PROLNG IP/OBS E/M EA 15 MIN: CPT | Mod: FS

## 2025-08-07 PROCEDURE — B4185 PARENTERAL SOL 10 GM LIPIDS: HCPCS | Performed by: SURGERY

## 2025-08-07 PROCEDURE — 250N000013 HC RX MED GY IP 250 OP 250 PS 637

## 2025-08-07 PROCEDURE — 82306 VITAMIN D 25 HYDROXY: CPT

## 2025-08-07 PROCEDURE — 99207 PR APP CREDIT; MD BILLING SHARED VISIT: CPT | Mod: FS

## 2025-08-07 PROCEDURE — 120N000002 HC R&B MED SURG/OB UMMC

## 2025-08-07 PROCEDURE — 83735 ASSAY OF MAGNESIUM: CPT

## 2025-08-07 PROCEDURE — 84100 ASSAY OF PHOSPHORUS: CPT

## 2025-08-07 PROCEDURE — 250N000009 HC RX 250: Performed by: SURGERY

## 2025-08-07 PROCEDURE — 250N000011 HC RX IP 250 OP 636

## 2025-08-07 PROCEDURE — 3E0436Z INTRODUCTION OF NUTRITIONAL SUBSTANCE INTO CENTRAL VEIN, PERCUTANEOUS APPROACH: ICD-10-PCS

## 2025-08-07 PROCEDURE — 250N000013 HC RX MED GY IP 250 OP 250 PS 637: Performed by: SURGERY

## 2025-08-07 PROCEDURE — 258N000003 HC RX IP 258 OP 636: Performed by: SURGERY

## 2025-08-07 PROCEDURE — 96374 THER/PROPH/DIAG INJ IV PUSH: CPT

## 2025-08-07 PROCEDURE — 36591 DRAW BLOOD OFF VENOUS DEVICE: CPT

## 2025-08-07 PROCEDURE — G0378 HOSPITAL OBSERVATION PER HR: HCPCS

## 2025-08-07 PROCEDURE — 96361 HYDRATE IV INFUSION ADD-ON: CPT

## 2025-08-07 PROCEDURE — 99223 1ST HOSP IP/OBS HIGH 75: CPT | Mod: FS

## 2025-08-07 PROCEDURE — 258N000003 HC RX IP 258 OP 636

## 2025-08-07 RX ORDER — DEXTROSE MONOHYDRATE 100 MG/ML
INJECTION, SOLUTION INTRAVENOUS CONTINUOUS PRN
Status: DISCONTINUED | OUTPATIENT
Start: 2025-08-07 | End: 2025-08-11 | Stop reason: HOSPADM

## 2025-08-07 RX ORDER — DEXTROAMPHETAMINE SACCHARATE, AMPHETAMINE ASPARTATE, DEXTROAMPHETAMINE SULFATE AND AMPHETAMINE SULFATE 2.5; 2.5; 2.5; 2.5 MG/1; MG/1; MG/1; MG/1
20 TABLET ORAL DAILY PRN
Refills: 0 | Status: DISCONTINUED | OUTPATIENT
Start: 2025-08-07 | End: 2025-08-11 | Stop reason: HOSPADM

## 2025-08-07 RX ORDER — LORAZEPAM 2 MG/ML
1 CONCENTRATE ORAL DAILY PRN
Status: DISCONTINUED | OUTPATIENT
Start: 2025-08-07 | End: 2025-08-07

## 2025-08-07 RX ORDER — PANTOPRAZOLE SODIUM 40 MG/1
40 TABLET, DELAYED RELEASE ORAL
Status: DISCONTINUED | OUTPATIENT
Start: 2025-08-07 | End: 2025-08-07

## 2025-08-07 RX ORDER — OXYCODONE HCL 5 MG/5 ML
15 SOLUTION, ORAL ORAL
Refills: 0 | Status: DISCONTINUED | OUTPATIENT
Start: 2025-08-07 | End: 2025-08-11 | Stop reason: HOSPADM

## 2025-08-07 RX ORDER — SUCRALFATE ORAL 1 G/10ML
1 SUSPENSION ORAL
Status: DISCONTINUED | OUTPATIENT
Start: 2025-08-07 | End: 2025-08-11 | Stop reason: HOSPADM

## 2025-08-07 RX ORDER — HYDROMORPHONE HCL IN WATER/PF 6 MG/30 ML
0.4 PATIENT CONTROLLED ANALGESIA SYRINGE INTRAVENOUS
Status: DISCONTINUED | OUTPATIENT
Start: 2025-08-07 | End: 2025-08-07

## 2025-08-07 RX ORDER — OXYCODONE HCL 5 MG/5 ML
5-10 SOLUTION, ORAL ORAL EVERY 4 HOURS PRN
Refills: 0 | Status: DISCONTINUED | OUTPATIENT
Start: 2025-08-07 | End: 2025-08-07

## 2025-08-07 RX ORDER — DEXTROSE MONOHYDRATE, SODIUM CHLORIDE, AND POTASSIUM CHLORIDE 50; 1.49; 4.5 G/1000ML; G/1000ML; G/1000ML
INJECTION, SOLUTION INTRAVENOUS CONTINUOUS
Status: DISCONTINUED | OUTPATIENT
Start: 2025-08-07 | End: 2025-08-08

## 2025-08-07 RX ORDER — PREGABALIN 20 MG/ML
25 SOLUTION ORAL AT BEDTIME
Status: DISCONTINUED | OUTPATIENT
Start: 2025-08-07 | End: 2025-08-11 | Stop reason: HOSPADM

## 2025-08-07 RX ORDER — NYSTATIN 100000 U/G
CREAM TOPICAL 2 TIMES DAILY PRN
Status: DISCONTINUED | OUTPATIENT
Start: 2025-08-07 | End: 2025-08-11 | Stop reason: HOSPADM

## 2025-08-07 RX ORDER — HYDROMORPHONE HCL IN WATER/PF 6 MG/30 ML
.2-.4 PATIENT CONTROLLED ANALGESIA SYRINGE INTRAVENOUS
Refills: 0 | Status: DISCONTINUED | OUTPATIENT
Start: 2025-08-07 | End: 2025-08-07

## 2025-08-07 RX ORDER — POLYETHYLENE GLYCOL 3350 17 G/17G
17 POWDER, FOR SOLUTION ORAL DAILY
Status: DISCONTINUED | OUTPATIENT
Start: 2025-08-07 | End: 2025-08-11 | Stop reason: HOSPADM

## 2025-08-07 RX ORDER — SENNOSIDES 8.6 MG
8.6 TABLET ORAL 2 TIMES DAILY
Status: DISCONTINUED | OUTPATIENT
Start: 2025-08-07 | End: 2025-08-11 | Stop reason: HOSPADM

## 2025-08-07 RX ORDER — ONDANSETRON 2 MG/ML
4 INJECTION INTRAMUSCULAR; INTRAVENOUS EVERY 6 HOURS PRN
Status: DISCONTINUED | OUTPATIENT
Start: 2025-08-07 | End: 2025-08-11 | Stop reason: HOSPADM

## 2025-08-07 RX ORDER — ONDANSETRON 4 MG/1
4 TABLET, ORALLY DISINTEGRATING ORAL EVERY 6 HOURS PRN
Status: DISCONTINUED | OUTPATIENT
Start: 2025-08-07 | End: 2025-08-11 | Stop reason: HOSPADM

## 2025-08-07 RX ORDER — ALBUTEROL SULFATE 90 UG/1
2 INHALANT RESPIRATORY (INHALATION) EVERY 6 HOURS PRN
Status: DISCONTINUED | OUTPATIENT
Start: 2025-08-07 | End: 2025-08-11 | Stop reason: HOSPADM

## 2025-08-07 RX ORDER — ALPRAZOLAM 0.5 MG
0.5 TABLET ORAL 2 TIMES DAILY PRN
Status: DISCONTINUED | OUTPATIENT
Start: 2025-08-07 | End: 2025-08-07

## 2025-08-07 RX ORDER — HYDROMORPHONE HCL IN WATER/PF 6 MG/30 ML
0.2 PATIENT CONTROLLED ANALGESIA SYRINGE INTRAVENOUS
Status: DISCONTINUED | OUTPATIENT
Start: 2025-08-07 | End: 2025-08-07

## 2025-08-07 RX ORDER — ENOXAPARIN SODIUM 150 MG/ML
120 INJECTION SUBCUTANEOUS EVERY 24 HOURS
Status: DISCONTINUED | OUTPATIENT
Start: 2025-08-07 | End: 2025-08-07

## 2025-08-07 RX ORDER — OXYCODONE HCL 5 MG/5 ML
15 SOLUTION, ORAL ORAL
Refills: 0 | Status: DISCONTINUED | OUTPATIENT
Start: 2025-08-07 | End: 2025-08-07

## 2025-08-07 RX ORDER — OXYCODONE HYDROCHLORIDE 5 MG/1
5 TABLET ORAL EVERY 4 HOURS PRN
Status: DISCONTINUED | OUTPATIENT
Start: 2025-08-07 | End: 2025-08-07

## 2025-08-07 RX ORDER — PANTOPRAZOLE SODIUM 40 MG/1
40 TABLET, DELAYED RELEASE ORAL
Status: DISCONTINUED | OUTPATIENT
Start: 2025-08-08 | End: 2025-08-07

## 2025-08-07 RX ORDER — POLYETHYLENE GLYCOL 3350 17 G
2 POWDER IN PACKET (EA) ORAL
Status: DISCONTINUED | OUTPATIENT
Start: 2025-08-07 | End: 2025-08-11 | Stop reason: HOSPADM

## 2025-08-07 RX ORDER — PROCHLORPERAZINE MALEATE 10 MG
10 TABLET ORAL EVERY 6 HOURS PRN
Status: DISCONTINUED | OUTPATIENT
Start: 2025-08-07 | End: 2025-08-07

## 2025-08-07 RX ORDER — LIDOCAINE 4 G/G
1-2 PATCH TOPICAL
Status: DISCONTINUED | OUTPATIENT
Start: 2025-08-08 | End: 2025-08-11 | Stop reason: HOSPADM

## 2025-08-07 RX ORDER — CARVEDILOL 12.5 MG/1
12.5 TABLET ORAL 2 TIMES DAILY WITH MEALS
Status: DISCONTINUED | OUTPATIENT
Start: 2025-08-07 | End: 2025-08-11 | Stop reason: HOSPADM

## 2025-08-07 RX ORDER — OXYCODONE HCL 5 MG/5 ML
10 SOLUTION, ORAL ORAL 3 TIMES DAILY PRN
Refills: 0 | Status: DISCONTINUED | OUTPATIENT
Start: 2025-08-07 | End: 2025-08-11 | Stop reason: HOSPADM

## 2025-08-07 RX ORDER — SODIUM CHLORIDE, SODIUM LACTATE, POTASSIUM CHLORIDE, CALCIUM CHLORIDE 600; 310; 30; 20 MG/100ML; MG/100ML; MG/100ML; MG/100ML
INJECTION, SOLUTION INTRAVENOUS CONTINUOUS
Status: DISCONTINUED | OUTPATIENT
Start: 2025-08-07 | End: 2025-08-07

## 2025-08-07 RX ORDER — SODIUM CHLORIDE, SODIUM LACTATE, POTASSIUM CHLORIDE, CALCIUM CHLORIDE 600; 310; 30; 20 MG/100ML; MG/100ML; MG/100ML; MG/100ML
INJECTION, SOLUTION INTRAVENOUS
Status: COMPLETED
Start: 2025-08-07 | End: 2025-08-07

## 2025-08-07 RX ORDER — OXYCODONE HYDROCHLORIDE 10 MG/1
10 TABLET ORAL EVERY 4 HOURS PRN
Status: DISCONTINUED | OUTPATIENT
Start: 2025-08-07 | End: 2025-08-07

## 2025-08-07 RX ORDER — AMOXICILLIN 250 MG
2 CAPSULE ORAL 2 TIMES DAILY PRN
Status: DISCONTINUED | OUTPATIENT
Start: 2025-08-07 | End: 2025-08-11 | Stop reason: HOSPADM

## 2025-08-07 RX ORDER — ONDANSETRON 2 MG/ML
4 INJECTION INTRAMUSCULAR; INTRAVENOUS EVERY 6 HOURS PRN
Status: DISCONTINUED | OUTPATIENT
Start: 2025-08-07 | End: 2025-08-07

## 2025-08-07 RX ORDER — PREGABALIN 25 MG/1
25 CAPSULE ORAL AT BEDTIME
Status: DISCONTINUED | OUTPATIENT
Start: 2025-08-07 | End: 2025-08-07

## 2025-08-07 RX ORDER — ALPRAZOLAM 0.5 MG
0.5 TABLET ORAL 2 TIMES DAILY PRN
Status: DISCONTINUED | OUTPATIENT
Start: 2025-08-07 | End: 2025-08-11 | Stop reason: HOSPADM

## 2025-08-07 RX ORDER — FENTANYL 25 UG/1
25 PATCH TRANSDERMAL
Refills: 0 | Status: DISCONTINUED | OUTPATIENT
Start: 2025-08-07 | End: 2025-08-11 | Stop reason: HOSPADM

## 2025-08-07 RX ORDER — ACETAMINOPHEN 325 MG/1
650 TABLET ORAL EVERY 4 HOURS PRN
Status: DISCONTINUED | OUTPATIENT
Start: 2025-08-07 | End: 2025-08-07

## 2025-08-07 RX ORDER — AMOXICILLIN 250 MG
1 CAPSULE ORAL 2 TIMES DAILY PRN
Status: DISCONTINUED | OUTPATIENT
Start: 2025-08-07 | End: 2025-08-11 | Stop reason: HOSPADM

## 2025-08-07 RX ORDER — OXYCODONE HYDROCHLORIDE 5 MG/1
5-10 TABLET ORAL EVERY 4 HOURS PRN
Refills: 0 | Status: DISCONTINUED | OUTPATIENT
Start: 2025-08-07 | End: 2025-08-07

## 2025-08-07 RX ORDER — ACETAMINOPHEN 325 MG/10.15ML
500 LIQUID ORAL EVERY 4 HOURS PRN
Status: DISCONTINUED | OUTPATIENT
Start: 2025-08-07 | End: 2025-08-11 | Stop reason: HOSPADM

## 2025-08-07 RX ADMIN — DEXTROAMPHETAMINE SACCHARATE, AMPHETAMINE ASPARTATE, DEXTROAMPHETAMINE SULFATE AND AMPHETAMINE SULFATE 20 MG: 2.5; 2.5; 2.5; 2.5 TABLET ORAL at 10:47

## 2025-08-07 RX ADMIN — MAGNESIUM SULFATE HEPTAHYDRATE: 500 INJECTION, SOLUTION INTRAMUSCULAR; INTRAVENOUS at 21:35

## 2025-08-07 RX ADMIN — SUCRALFATE 1 G: 1 SUSPENSION ORAL at 11:39

## 2025-08-07 RX ADMIN — ALPRAZOLAM 0.5 MG: 0.5 TABLET ORAL at 01:43

## 2025-08-07 RX ADMIN — ONDANSETRON 4 MG: 2 INJECTION INTRAMUSCULAR; INTRAVENOUS at 01:43

## 2025-08-07 RX ADMIN — ONDANSETRON 4 MG: 4 TABLET, ORALLY DISINTEGRATING ORAL at 17:10

## 2025-08-07 RX ADMIN — CARVEDILOL 12.5 MG: 12.5 TABLET, FILM COATED ORAL at 17:10

## 2025-08-07 RX ADMIN — DEXTROSE, SODIUM CHLORIDE, AND POTASSIUM CHLORIDE: 5; .45; .15 INJECTION INTRAVENOUS at 15:07

## 2025-08-07 RX ADMIN — OLIVE OIL AND SOYBEAN OIL 250 ML: 16; 4 INJECTION, EMULSION INTRAVENOUS at 21:41

## 2025-08-07 RX ADMIN — SUCRALFATE 1 G: 1 SUSPENSION ORAL at 22:54

## 2025-08-07 RX ADMIN — FENTANYL 1 PATCH: 25 PATCH TRANSDERMAL at 10:38

## 2025-08-07 RX ADMIN — ALPRAZOLAM 0.5 MG: 0.5 TABLET ORAL at 22:55

## 2025-08-07 RX ADMIN — CARVEDILOL 12.5 MG: 12.5 TABLET, FILM COATED ORAL at 09:30

## 2025-08-07 RX ADMIN — OXYCODONE HYDROCHLORIDE 15 MG: 5 SOLUTION ORAL at 22:54

## 2025-08-07 RX ADMIN — SODIUM CHLORIDE, SODIUM LACTATE, POTASSIUM CHLORIDE, CALCIUM CHLORIDE: 600; 310; 30; 20 INJECTION, SOLUTION INTRAVENOUS at 05:44

## 2025-08-07 RX ADMIN — SODIUM CHLORIDE, SODIUM LACTATE, POTASSIUM CHLORIDE, AND CALCIUM CHLORIDE: .6; .31; .03; .02 INJECTION, SOLUTION INTRAVENOUS at 05:44

## 2025-08-07 RX ADMIN — SUCRALFATE 1 G: 1 SUSPENSION ORAL at 09:31

## 2025-08-07 RX ADMIN — OXYCODONE HYDROCHLORIDE 10 MG: 5 SOLUTION ORAL at 17:09

## 2025-08-07 RX ADMIN — SODIUM CHLORIDE, SODIUM LACTATE, POTASSIUM CHLORIDE, AND CALCIUM CHLORIDE: .6; .31; .03; .02 INJECTION, SOLUTION INTRAVENOUS at 10:45

## 2025-08-07 RX ADMIN — SUCRALFATE 1 G: 1 SUSPENSION ORAL at 17:10

## 2025-08-07 RX ADMIN — PANTOPRAZOLE SODIUM 40 MG: 40 TABLET, DELAYED RELEASE ORAL at 13:58

## 2025-08-07 RX ADMIN — ALPRAZOLAM 0.5 MG: 0.5 TABLET ORAL at 10:47

## 2025-08-07 RX ADMIN — PREGABALIN 25 MG: 20 SOLUTION ORAL at 01:43

## 2025-08-07 RX ADMIN — OXYCODONE HYDROCHLORIDE 10 MG: 5 SOLUTION ORAL at 01:42

## 2025-08-07 RX ADMIN — OXYCODONE HYDROCHLORIDE 10 MG: 5 SOLUTION ORAL at 10:47

## 2025-08-07 RX ADMIN — PREGABALIN 25 MG: 20 SOLUTION ORAL at 22:55

## 2025-08-07 RX ADMIN — OXYCODONE HYDROCHLORIDE 10 MG: 5 SOLUTION ORAL at 05:47

## 2025-08-07 ASSESSMENT — ACTIVITIES OF DAILY LIVING (ADL)
ADLS_ACUITY_SCORE: 58
ADLS_ACUITY_SCORE: 32
ADLS_ACUITY_SCORE: 58
ADLS_ACUITY_SCORE: 32
ADLS_ACUITY_SCORE: 58
ADLS_ACUITY_SCORE: 32
ADLS_ACUITY_SCORE: 58
ADLS_ACUITY_SCORE: 58
ADLS_ACUITY_SCORE: 32
ADLS_ACUITY_SCORE: 32
ADLS_ACUITY_SCORE: 58
ADLS_ACUITY_SCORE: 58
DEPENDENT_IADLS:: TRANSPORTATION
ADLS_ACUITY_SCORE: 32
ADLS_ACUITY_SCORE: 58
ADLS_ACUITY_SCORE: 32
ADLS_ACUITY_SCORE: 58
ADLS_ACUITY_SCORE: 58

## 2025-08-08 LAB
ALBUMIN SERPL BCG-MCNC: 3.8 G/DL (ref 3.5–5.2)
ALP SERPL-CCNC: 95 U/L (ref 40–150)
ALT SERPL W P-5'-P-CCNC: 14 U/L (ref 0–70)
ANION GAP SERPL CALCULATED.3IONS-SCNC: 9 MMOL/L (ref 7–15)
AST SERPL W P-5'-P-CCNC: 17 U/L (ref 0–45)
BILIRUB SERPL-MCNC: 0.3 MG/DL
BILIRUBIN DIRECT (ROCHE PRO & PURE): 0.12 MG/DL (ref 0–0.45)
BUN SERPL-MCNC: 15.3 MG/DL (ref 6–20)
CALCIUM SERPL-MCNC: 8.9 MG/DL (ref 8.8–10.4)
CHLORIDE SERPL-SCNC: 105 MMOL/L (ref 98–107)
CREAT SERPL-MCNC: 0.67 MG/DL (ref 0.67–1.17)
EGFRCR SERPLBLD CKD-EPI 2021: >90 ML/MIN/1.73M2
GLUCOSE BLDC GLUCOMTR-MCNC: 101 MG/DL (ref 70–99)
GLUCOSE BLDC GLUCOMTR-MCNC: 107 MG/DL (ref 70–99)
GLUCOSE BLDC GLUCOMTR-MCNC: 93 MG/DL (ref 70–99)
GLUCOSE SERPL-MCNC: 93 MG/DL (ref 70–99)
HCO3 SERPL-SCNC: 27 MMOL/L (ref 22–29)
HOLD SPECIMEN: NORMAL
INR PPP: 1.11 (ref 0.85–1.15)
MAGNESIUM SERPL-MCNC: 2 MG/DL (ref 1.7–2.3)
PHOSPHATE SERPL-MCNC: 4.2 MG/DL (ref 2.5–4.5)
POTASSIUM SERPL-SCNC: 4.5 MMOL/L (ref 3.4–5.3)
PREALB SERPL-MCNC: 20 MG/DL (ref 20–40)
PROT SERPL-MCNC: 6.8 G/DL (ref 6.4–8.3)
PROTHROMBIN TIME: 14.7 SECONDS (ref 11.8–14.8)
SODIUM SERPL-SCNC: 141 MMOL/L (ref 135–145)
TRIGL SERPL-MCNC: 107 MG/DL

## 2025-08-08 PROCEDURE — 250N000011 HC RX IP 250 OP 636

## 2025-08-08 PROCEDURE — 99418 PROLNG IP/OBS E/M EA 15 MIN: CPT | Mod: FS

## 2025-08-08 PROCEDURE — 250N000009 HC RX 250

## 2025-08-08 PROCEDURE — 36591 DRAW BLOOD OFF VENOUS DEVICE: CPT | Performed by: SURGERY

## 2025-08-08 PROCEDURE — 250N000009 HC RX 250: Performed by: SURGERY

## 2025-08-08 PROCEDURE — 250N000013 HC RX MED GY IP 250 OP 250 PS 637

## 2025-08-08 PROCEDURE — B4185 PARENTERAL SOL 10 GM LIPIDS: HCPCS | Performed by: SURGERY

## 2025-08-08 PROCEDURE — 258N000003 HC RX IP 258 OP 636

## 2025-08-08 PROCEDURE — 83735 ASSAY OF MAGNESIUM: CPT | Performed by: SURGERY

## 2025-08-08 PROCEDURE — 84478 ASSAY OF TRIGLYCERIDES: CPT

## 2025-08-08 PROCEDURE — 84134 ASSAY OF PREALBUMIN: CPT | Performed by: SURGERY

## 2025-08-08 PROCEDURE — 250N000013 HC RX MED GY IP 250 OP 250 PS 637: Performed by: STUDENT IN AN ORGANIZED HEALTH CARE EDUCATION/TRAINING PROGRAM

## 2025-08-08 PROCEDURE — 120N000002 HC R&B MED SURG/OB UMMC

## 2025-08-08 PROCEDURE — 84100 ASSAY OF PHOSPHORUS: CPT | Performed by: SURGERY

## 2025-08-08 PROCEDURE — 99207 PR APP CREDIT; MD BILLING SHARED VISIT: CPT | Mod: FS

## 2025-08-08 PROCEDURE — 99233 SBSQ HOSP IP/OBS HIGH 50: CPT | Mod: FS

## 2025-08-08 PROCEDURE — 82248 BILIRUBIN DIRECT: CPT | Performed by: SURGERY

## 2025-08-08 PROCEDURE — 85610 PROTHROMBIN TIME: CPT | Performed by: SURGERY

## 2025-08-08 PROCEDURE — 82310 ASSAY OF CALCIUM: CPT | Performed by: SURGERY

## 2025-08-08 RX ORDER — NALOXONE HYDROCHLORIDE 0.4 MG/ML
0.2 INJECTION, SOLUTION INTRAMUSCULAR; INTRAVENOUS; SUBCUTANEOUS
Status: DISCONTINUED | OUTPATIENT
Start: 2025-08-08 | End: 2025-08-11 | Stop reason: HOSPADM

## 2025-08-08 RX ORDER — DIPHENHYDRAMINE HYDROCHLORIDE 50 MG/ML
25 INJECTION, SOLUTION INTRAMUSCULAR; INTRAVENOUS
Status: DISCONTINUED | OUTPATIENT
Start: 2025-08-08 | End: 2025-08-11 | Stop reason: HOSPADM

## 2025-08-08 RX ORDER — MEPERIDINE HYDROCHLORIDE 25 MG/ML
25 INJECTION INTRAMUSCULAR; INTRAVENOUS; SUBCUTANEOUS
Refills: 0 | Status: DISCONTINUED | OUTPATIENT
Start: 2025-08-08 | End: 2025-08-11 | Stop reason: HOSPADM

## 2025-08-08 RX ORDER — FERROUS SULFATE 325(65) MG
325 TABLET ORAL
Status: DISCONTINUED | OUTPATIENT
Start: 2025-08-08 | End: 2025-08-08

## 2025-08-08 RX ORDER — ALBUTEROL SULFATE 0.83 MG/ML
2.5 SOLUTION RESPIRATORY (INHALATION)
Status: DISCONTINUED | OUTPATIENT
Start: 2025-08-08 | End: 2025-08-11 | Stop reason: HOSPADM

## 2025-08-08 RX ORDER — NALOXONE HYDROCHLORIDE 0.4 MG/ML
0.4 INJECTION, SOLUTION INTRAMUSCULAR; INTRAVENOUS; SUBCUTANEOUS
Status: DISCONTINUED | OUTPATIENT
Start: 2025-08-08 | End: 2025-08-11 | Stop reason: HOSPADM

## 2025-08-08 RX ORDER — METHYLPREDNISOLONE SODIUM SUCCINATE 40 MG/ML
40 INJECTION INTRAMUSCULAR; INTRAVENOUS
Status: DISCONTINUED | OUTPATIENT
Start: 2025-08-08 | End: 2025-08-11 | Stop reason: HOSPADM

## 2025-08-08 RX ORDER — LANOLIN ALCOHOL/MO/W.PET/CERES
1000 CREAM (GRAM) TOPICAL DAILY
Status: DISCONTINUED | OUTPATIENT
Start: 2025-08-08 | End: 2025-08-11 | Stop reason: HOSPADM

## 2025-08-08 RX ORDER — PANTOPRAZOLE SODIUM 40 MG/1
40 TABLET, DELAYED RELEASE ORAL DAILY
Status: DISCONTINUED | OUTPATIENT
Start: 2025-08-08 | End: 2025-08-11 | Stop reason: HOSPADM

## 2025-08-08 RX ORDER — DIPHENHYDRAMINE HYDROCHLORIDE 50 MG/ML
50 INJECTION, SOLUTION INTRAMUSCULAR; INTRAVENOUS
Status: DISCONTINUED | OUTPATIENT
Start: 2025-08-08 | End: 2025-08-11 | Stop reason: HOSPADM

## 2025-08-08 RX ORDER — ALBUTEROL SULFATE 90 UG/1
1-2 INHALANT RESPIRATORY (INHALATION)
Status: DISCONTINUED | OUTPATIENT
Start: 2025-08-08 | End: 2025-08-11 | Stop reason: HOSPADM

## 2025-08-08 RX ADMIN — ONDANSETRON 4 MG: 4 TABLET, ORALLY DISINTEGRATING ORAL at 07:14

## 2025-08-08 RX ADMIN — DEXTROAMPHETAMINE SACCHARATE, AMPHETAMINE ASPARTATE, DEXTROAMPHETAMINE SULFATE AND AMPHETAMINE SULFATE 20 MG: 2.5; 2.5; 2.5; 2.5 TABLET ORAL at 08:20

## 2025-08-08 RX ADMIN — CARVEDILOL 12.5 MG: 12.5 TABLET, FILM COATED ORAL at 17:15

## 2025-08-08 RX ADMIN — PANTOPRAZOLE SODIUM 40 MG: 40 TABLET, DELAYED RELEASE ORAL at 08:10

## 2025-08-08 RX ADMIN — NYSTATIN: 100000 CREAM TOPICAL at 08:12

## 2025-08-08 RX ADMIN — MAGNESIUM SULFATE HEPTAHYDRATE: 500 INJECTION, SOLUTION INTRAMUSCULAR; INTRAVENOUS at 20:33

## 2025-08-08 RX ADMIN — SODIUM CHLORIDE 1000 MG: 0.9 INJECTION, SOLUTION INTRAVENOUS at 12:11

## 2025-08-08 RX ADMIN — ALPRAZOLAM 0.5 MG: 0.5 TABLET ORAL at 08:19

## 2025-08-08 RX ADMIN — ALPRAZOLAM 0.5 MG: 0.5 TABLET ORAL at 21:04

## 2025-08-08 RX ADMIN — PREGABALIN 25 MG: 20 SOLUTION ORAL at 21:04

## 2025-08-08 RX ADMIN — CARVEDILOL 12.5 MG: 12.5 TABLET, FILM COATED ORAL at 08:11

## 2025-08-08 RX ADMIN — OXYCODONE HYDROCHLORIDE 10 MG: 5 SOLUTION ORAL at 06:42

## 2025-08-08 RX ADMIN — ONDANSETRON 4 MG: 4 TABLET, ORALLY DISINTEGRATING ORAL at 13:03

## 2025-08-08 RX ADMIN — OXYCODONE HYDROCHLORIDE 15 MG: 5 SOLUTION ORAL at 21:04

## 2025-08-08 RX ADMIN — OXYCODONE HYDROCHLORIDE 10 MG: 5 SOLUTION ORAL at 13:01

## 2025-08-08 RX ADMIN — SUCRALFATE 1 G: 1 SUSPENSION ORAL at 11:40

## 2025-08-08 RX ADMIN — SUCRALFATE 1 G: 1 SUSPENSION ORAL at 17:14

## 2025-08-08 RX ADMIN — OXYCODONE HYDROCHLORIDE 10 MG: 5 SOLUTION ORAL at 17:14

## 2025-08-08 RX ADMIN — ACETAMINOPHEN 500 MG: 325 SOLUTION ORAL at 13:02

## 2025-08-08 RX ADMIN — SUCRALFATE 1 G: 1 SUSPENSION ORAL at 07:35

## 2025-08-08 RX ADMIN — OLIVE OIL AND SOYBEAN OIL 250 ML: 16; 4 INJECTION, EMULSION INTRAVENOUS at 20:05

## 2025-08-08 ASSESSMENT — ACTIVITIES OF DAILY LIVING (ADL)
ADLS_ACUITY_SCORE: 32
ADLS_ACUITY_SCORE: 32
WALKING_OR_CLIMBING_STAIRS_DIFFICULTY: NO
ADLS_ACUITY_SCORE: 32
DRESSING/BATHING_DIFFICULTY: NO
FALL_HISTORY_WITHIN_LAST_SIX_MONTHS: NO
ADLS_ACUITY_SCORE: 32
HEARING_DIFFICULTY_OR_DEAF: NO
ADLS_ACUITY_SCORE: 32
ADLS_ACUITY_SCORE: 32
DIFFICULTY_EATING/SWALLOWING: NO
DIFFICULTY_COMMUNICATING: NO
ADLS_ACUITY_SCORE: 32
TOILETING_ISSUES: NO
ADLS_ACUITY_SCORE: 32
ADLS_ACUITY_SCORE: 32
CHANGE_IN_FUNCTIONAL_STATUS_SINCE_ONSET_OF_CURRENT_ILLNESS/INJURY: NO
ADLS_ACUITY_SCORE: 32
WEAR_GLASSES_OR_BLIND: NO
ADLS_ACUITY_SCORE: 32
CONCENTRATING,_REMEMBERING_OR_MAKING_DECISIONS_DIFFICULTY: NO
DOING_ERRANDS_INDEPENDENTLY_DIFFICULTY: NO
ADLS_ACUITY_SCORE: 32

## 2025-08-09 LAB
ANION GAP SERPL CALCULATED.3IONS-SCNC: 11 MMOL/L (ref 7–15)
BUN SERPL-MCNC: 15.7 MG/DL (ref 6–20)
CALCIUM SERPL-MCNC: 8.5 MG/DL (ref 8.8–10.4)
CHLORIDE SERPL-SCNC: 106 MMOL/L (ref 98–107)
CREAT SERPL-MCNC: 0.69 MG/DL (ref 0.67–1.17)
EGFRCR SERPLBLD CKD-EPI 2021: >90 ML/MIN/1.73M2
GLUCOSE BLDC GLUCOMTR-MCNC: 100 MG/DL (ref 70–99)
GLUCOSE BLDC GLUCOMTR-MCNC: 104 MG/DL (ref 70–99)
GLUCOSE BLDC GLUCOMTR-MCNC: 111 MG/DL (ref 70–99)
GLUCOSE BLDC GLUCOMTR-MCNC: 96 MG/DL (ref 70–99)
GLUCOSE SERPL-MCNC: 92 MG/DL (ref 70–99)
HCO3 SERPL-SCNC: 25 MMOL/L (ref 22–29)
MAGNESIUM SERPL-MCNC: 1.9 MG/DL (ref 1.7–2.3)
PHOSPHATE SERPL-MCNC: 4.4 MG/DL (ref 2.5–4.5)
POTASSIUM SERPL-SCNC: 3.5 MMOL/L (ref 3.4–5.3)
SODIUM SERPL-SCNC: 142 MMOL/L (ref 135–145)

## 2025-08-09 PROCEDURE — 250N000013 HC RX MED GY IP 250 OP 250 PS 637

## 2025-08-09 PROCEDURE — 250N000011 HC RX IP 250 OP 636

## 2025-08-09 PROCEDURE — 250N000009 HC RX 250: Performed by: STUDENT IN AN ORGANIZED HEALTH CARE EDUCATION/TRAINING PROGRAM

## 2025-08-09 PROCEDURE — 84425 ASSAY OF VITAMIN B-1: CPT

## 2025-08-09 PROCEDURE — 84100 ASSAY OF PHOSPHORUS: CPT | Performed by: SURGERY

## 2025-08-09 PROCEDURE — 99232 SBSQ HOSP IP/OBS MODERATE 35: CPT | Performed by: STUDENT IN AN ORGANIZED HEALTH CARE EDUCATION/TRAINING PROGRAM

## 2025-08-09 PROCEDURE — 80048 BASIC METABOLIC PNL TOTAL CA: CPT | Performed by: SURGERY

## 2025-08-09 PROCEDURE — 250N000013 HC RX MED GY IP 250 OP 250 PS 637: Performed by: STUDENT IN AN ORGANIZED HEALTH CARE EDUCATION/TRAINING PROGRAM

## 2025-08-09 PROCEDURE — 120N000002 HC R&B MED SURG/OB UMMC

## 2025-08-09 PROCEDURE — 83735 ASSAY OF MAGNESIUM: CPT | Performed by: SURGERY

## 2025-08-09 RX ORDER — MAGNESIUM OXIDE 400 MG/1
400 TABLET ORAL EVERY 4 HOURS
Status: COMPLETED | OUTPATIENT
Start: 2025-08-09 | End: 2025-08-09

## 2025-08-09 RX ADMIN — MAGNESIUM OXIDE TAB 400 MG (241.3 MG ELEMENTAL MG) 400 MG: 400 (241.3 MG) TAB at 10:43

## 2025-08-09 RX ADMIN — ALPRAZOLAM 0.5 MG: 0.5 TABLET ORAL at 09:21

## 2025-08-09 RX ADMIN — OXYCODONE HYDROCHLORIDE 10 MG: 5 SOLUTION ORAL at 13:52

## 2025-08-09 RX ADMIN — SENNOSIDES 8.6 MG: 8.6 TABLET, FILM COATED ORAL at 09:22

## 2025-08-09 RX ADMIN — ALPRAZOLAM 0.5 MG: 0.5 TABLET ORAL at 22:04

## 2025-08-09 RX ADMIN — POLYETHYLENE GLYCOL 3350 17 G: 17 POWDER, FOR SOLUTION ORAL at 09:22

## 2025-08-09 RX ADMIN — MAGNESIUM OXIDE TAB 400 MG (241.3 MG ELEMENTAL MG) 400 MG: 400 (241.3 MG) TAB at 13:52

## 2025-08-09 RX ADMIN — ONDANSETRON 4 MG: 4 TABLET, ORALLY DISINTEGRATING ORAL at 22:04

## 2025-08-09 RX ADMIN — OXYCODONE HYDROCHLORIDE 10 MG: 5 SOLUTION ORAL at 02:44

## 2025-08-09 RX ADMIN — OXYCODONE HYDROCHLORIDE 15 MG: 5 SOLUTION ORAL at 22:03

## 2025-08-09 RX ADMIN — FENTANYL 1 PATCH: 25 PATCH TRANSDERMAL at 10:43

## 2025-08-09 RX ADMIN — SUCRALFATE 1 G: 1 SUSPENSION ORAL at 09:21

## 2025-08-09 RX ADMIN — MAGNESIUM SULFATE HEPTAHYDRATE: 500 INJECTION, SOLUTION INTRAMUSCULAR; INTRAVENOUS at 22:09

## 2025-08-09 RX ADMIN — DEXTROAMPHETAMINE SACCHARATE, AMPHETAMINE ASPARTATE, DEXTROAMPHETAMINE SULFATE AND AMPHETAMINE SULFATE 20 MG: 2.5; 2.5; 2.5; 2.5 TABLET ORAL at 09:21

## 2025-08-09 RX ADMIN — CARVEDILOL 12.5 MG: 12.5 TABLET, FILM COATED ORAL at 09:21

## 2025-08-09 RX ADMIN — CARVEDILOL 12.5 MG: 12.5 TABLET, FILM COATED ORAL at 17:58

## 2025-08-09 RX ADMIN — PREGABALIN 25 MG: 20 SOLUTION ORAL at 22:04

## 2025-08-09 RX ADMIN — PANTOPRAZOLE SODIUM 40 MG: 40 TABLET, DELAYED RELEASE ORAL at 09:21

## 2025-08-09 RX ADMIN — OXYCODONE HYDROCHLORIDE 10 MG: 5 SOLUTION ORAL at 09:20

## 2025-08-09 RX ADMIN — SUCRALFATE 1 G: 1 SUSPENSION ORAL at 22:03

## 2025-08-09 RX ADMIN — SUCRALFATE 1 G: 1 SUSPENSION ORAL at 17:57

## 2025-08-09 RX ADMIN — CYANOCOBALAMIN TAB 1000 MCG 1000 MCG: 1000 TAB at 09:22

## 2025-08-09 RX ADMIN — SUCRALFATE 1 G: 1 SUSPENSION ORAL at 10:43

## 2025-08-09 ASSESSMENT — ACTIVITIES OF DAILY LIVING (ADL)
ADLS_ACUITY_SCORE: 32

## 2025-08-10 ENCOUNTER — APPOINTMENT (OUTPATIENT)
Dept: GENERAL RADIOLOGY | Facility: CLINIC | Age: 55
End: 2025-08-10
Attending: STUDENT IN AN ORGANIZED HEALTH CARE EDUCATION/TRAINING PROGRAM
Payer: COMMERCIAL

## 2025-08-10 LAB
ANION GAP SERPL CALCULATED.3IONS-SCNC: 10 MMOL/L (ref 7–15)
BUN SERPL-MCNC: 14.4 MG/DL (ref 6–20)
CALCIUM SERPL-MCNC: 8.4 MG/DL (ref 8.8–10.4)
CHLORIDE SERPL-SCNC: 106 MMOL/L (ref 98–107)
CREAT SERPL-MCNC: 0.7 MG/DL (ref 0.67–1.17)
EGFRCR SERPLBLD CKD-EPI 2021: >90 ML/MIN/1.73M2
GLUCOSE BLDC GLUCOMTR-MCNC: 114 MG/DL (ref 70–99)
GLUCOSE BLDC GLUCOMTR-MCNC: 121 MG/DL (ref 70–99)
GLUCOSE BLDC GLUCOMTR-MCNC: 128 MG/DL (ref 70–99)
GLUCOSE SERPL-MCNC: 103 MG/DL (ref 70–99)
HCO3 SERPL-SCNC: 27 MMOL/L (ref 22–29)
MAGNESIUM SERPL-MCNC: 2.1 MG/DL (ref 1.7–2.3)
PHOSPHATE SERPL-MCNC: 4.2 MG/DL (ref 2.5–4.5)
POTASSIUM SERPL-SCNC: 3.8 MMOL/L (ref 3.4–5.3)
SODIUM SERPL-SCNC: 143 MMOL/L (ref 135–145)

## 2025-08-10 PROCEDURE — 71046 X-RAY EXAM CHEST 2 VIEWS: CPT | Mod: 26 | Performed by: RADIOLOGY

## 2025-08-10 PROCEDURE — 250N000013 HC RX MED GY IP 250 OP 250 PS 637

## 2025-08-10 PROCEDURE — 250N000009 HC RX 250: Performed by: STUDENT IN AN ORGANIZED HEALTH CARE EDUCATION/TRAINING PROGRAM

## 2025-08-10 PROCEDURE — 84100 ASSAY OF PHOSPHORUS: CPT

## 2025-08-10 PROCEDURE — 99233 SBSQ HOSP IP/OBS HIGH 50: CPT | Performed by: STUDENT IN AN ORGANIZED HEALTH CARE EDUCATION/TRAINING PROGRAM

## 2025-08-10 PROCEDURE — 999N000202 HC STATISTICAL VASC ACCESS NURSE TIME, 1-15 MINUTES

## 2025-08-10 PROCEDURE — 80048 BASIC METABOLIC PNL TOTAL CA: CPT

## 2025-08-10 PROCEDURE — 999N000040 HC STATISTIC CONSULT NO CHARGE VASC ACCESS

## 2025-08-10 PROCEDURE — 250N000011 HC RX IP 250 OP 636

## 2025-08-10 PROCEDURE — 71046 X-RAY EXAM CHEST 2 VIEWS: CPT

## 2025-08-10 PROCEDURE — 120N000002 HC R&B MED SURG/OB UMMC

## 2025-08-10 PROCEDURE — 83735 ASSAY OF MAGNESIUM: CPT

## 2025-08-10 RX ADMIN — MAGNESIUM SULFATE HEPTAHYDRATE: 500 INJECTION, SOLUTION INTRAMUSCULAR; INTRAVENOUS at 21:07

## 2025-08-10 RX ADMIN — CARVEDILOL 12.5 MG: 12.5 TABLET, FILM COATED ORAL at 10:11

## 2025-08-10 RX ADMIN — PANTOPRAZOLE SODIUM 40 MG: 40 TABLET, DELAYED RELEASE ORAL at 10:11

## 2025-08-10 RX ADMIN — OXYCODONE HYDROCHLORIDE 10 MG: 5 SOLUTION ORAL at 10:26

## 2025-08-10 RX ADMIN — OXYCODONE HYDROCHLORIDE 10 MG: 5 SOLUTION ORAL at 03:38

## 2025-08-10 RX ADMIN — OXYCODONE HYDROCHLORIDE 10 MG: 5 SOLUTION ORAL at 15:33

## 2025-08-10 RX ADMIN — ONDANSETRON 4 MG: 4 TABLET, ORALLY DISINTEGRATING ORAL at 03:39

## 2025-08-10 RX ADMIN — SENNOSIDES 8.6 MG: 8.6 TABLET, FILM COATED ORAL at 10:11

## 2025-08-10 RX ADMIN — DEXTROAMPHETAMINE SACCHARATE, AMPHETAMINE ASPARTATE, DEXTROAMPHETAMINE SULFATE AND AMPHETAMINE SULFATE 20 MG: 2.5; 2.5; 2.5; 2.5 TABLET ORAL at 10:27

## 2025-08-10 RX ADMIN — ALPRAZOLAM 0.5 MG: 0.5 TABLET ORAL at 21:56

## 2025-08-10 RX ADMIN — CYANOCOBALAMIN TAB 1000 MCG 1000 MCG: 1000 TAB at 10:11

## 2025-08-10 RX ADMIN — OXYCODONE HYDROCHLORIDE 15 MG: 5 SOLUTION ORAL at 21:56

## 2025-08-10 RX ADMIN — ONDANSETRON 4 MG: 4 TABLET, ORALLY DISINTEGRATING ORAL at 21:56

## 2025-08-10 RX ADMIN — CARVEDILOL 12.5 MG: 12.5 TABLET, FILM COATED ORAL at 18:32

## 2025-08-10 RX ADMIN — SUCRALFATE 1 G: 1 SUSPENSION ORAL at 21:56

## 2025-08-10 RX ADMIN — SUCRALFATE 1 G: 1 SUSPENSION ORAL at 10:11

## 2025-08-10 RX ADMIN — ALPRAZOLAM 0.5 MG: 0.5 TABLET ORAL at 10:27

## 2025-08-10 RX ADMIN — PREGABALIN 25 MG: 20 SOLUTION ORAL at 21:56

## 2025-08-10 ASSESSMENT — ACTIVITIES OF DAILY LIVING (ADL)
ADLS_ACUITY_SCORE: 32

## 2025-08-11 ENCOUNTER — TELEPHONE (OUTPATIENT)
Dept: ENDOCRINOLOGY | Facility: CLINIC | Age: 55
End: 2025-08-11
Payer: COMMERCIAL

## 2025-08-11 ENCOUNTER — TELEPHONE (OUTPATIENT)
Dept: INTERNAL MEDICINE | Facility: CLINIC | Age: 55
End: 2025-08-11
Payer: COMMERCIAL

## 2025-08-11 VITALS
HEIGHT: 70 IN | HEART RATE: 63 BPM | BODY MASS INDEX: 27.9 KG/M2 | SYSTOLIC BLOOD PRESSURE: 102 MMHG | DIASTOLIC BLOOD PRESSURE: 71 MMHG | WEIGHT: 194.9 LBS | RESPIRATION RATE: 16 BRPM | OXYGEN SATURATION: 99 % | TEMPERATURE: 98.3 F

## 2025-08-11 LAB
ALBUMIN SERPL BCG-MCNC: 3.5 G/DL (ref 3.5–5.2)
ALP SERPL-CCNC: 87 U/L (ref 40–150)
ALT SERPL W P-5'-P-CCNC: 10 U/L (ref 0–70)
ANION GAP SERPL CALCULATED.3IONS-SCNC: 8 MMOL/L (ref 7–15)
AST SERPL W P-5'-P-CCNC: 13 U/L (ref 0–45)
BILIRUB SERPL-MCNC: 0.2 MG/DL
BUN SERPL-MCNC: 19.2 MG/DL (ref 6–20)
CALCIUM SERPL-MCNC: 8.3 MG/DL (ref 8.8–10.4)
CHLORIDE SERPL-SCNC: 109 MMOL/L (ref 98–107)
CREAT SERPL-MCNC: 0.7 MG/DL (ref 0.67–1.17)
EGFRCR SERPLBLD CKD-EPI 2021: >90 ML/MIN/1.73M2
GLUCOSE BLDC GLUCOMTR-MCNC: 106 MG/DL (ref 70–99)
GLUCOSE BLDC GLUCOMTR-MCNC: 130 MG/DL (ref 70–99)
GLUCOSE BLDC GLUCOMTR-MCNC: 85 MG/DL (ref 70–99)
GLUCOSE SERPL-MCNC: 82 MG/DL (ref 70–99)
HCO3 SERPL-SCNC: 26 MMOL/L (ref 22–29)
INR PPP: 1.51 (ref 0.85–1.15)
MAGNESIUM SERPL-MCNC: 2.5 MG/DL (ref 1.7–2.3)
PHOSPHATE SERPL-MCNC: 4.3 MG/DL (ref 2.5–4.5)
POTASSIUM SERPL-SCNC: 4.1 MMOL/L (ref 3.4–5.3)
PREALB SERPL-MCNC: 18.7 MG/DL (ref 20–40)
PROT SERPL-MCNC: 6.4 G/DL (ref 6.4–8.3)
PROTHROMBIN TIME: 18.8 SECONDS (ref 11.8–14.8)
SODIUM SERPL-SCNC: 143 MMOL/L (ref 135–145)

## 2025-08-11 PROCEDURE — 83735 ASSAY OF MAGNESIUM: CPT | Performed by: SURGERY

## 2025-08-11 PROCEDURE — 250N000013 HC RX MED GY IP 250 OP 250 PS 637

## 2025-08-11 PROCEDURE — 84155 ASSAY OF PROTEIN SERUM: CPT | Performed by: SURGERY

## 2025-08-11 PROCEDURE — 250N000011 HC RX IP 250 OP 636

## 2025-08-11 PROCEDURE — 84100 ASSAY OF PHOSPHORUS: CPT | Performed by: SURGERY

## 2025-08-11 PROCEDURE — 85610 PROTHROMBIN TIME: CPT | Performed by: SURGERY

## 2025-08-11 PROCEDURE — 99239 HOSP IP/OBS DSCHRG MGMT >30: CPT | Performed by: STUDENT IN AN ORGANIZED HEALTH CARE EDUCATION/TRAINING PROGRAM

## 2025-08-11 PROCEDURE — 84134 ASSAY OF PREALBUMIN: CPT | Performed by: SURGERY

## 2025-08-11 RX ORDER — ALBUTEROL SULFATE 90 UG/1
1-2 INHALANT RESPIRATORY (INHALATION)
Start: 2025-08-18

## 2025-08-11 RX ORDER — EPINEPHRINE 1 MG/ML
0.3 INJECTION, SOLUTION, CONCENTRATE INTRAVENOUS EVERY 5 MIN PRN
OUTPATIENT
Start: 2025-08-18

## 2025-08-11 RX ORDER — DOXEPIN 6 MG/1
6 TABLET, FILM COATED ORAL
Qty: 7 TABLET | Refills: 0 | Status: SHIPPED | OUTPATIENT
Start: 2025-08-11

## 2025-08-11 RX ORDER — METHYLPREDNISOLONE SODIUM SUCCINATE 40 MG/ML
40 INJECTION INTRAMUSCULAR; INTRAVENOUS
Start: 2025-08-18

## 2025-08-11 RX ORDER — DIPHENHYDRAMINE HYDROCHLORIDE 50 MG/ML
25 INJECTION, SOLUTION INTRAMUSCULAR; INTRAVENOUS
Start: 2025-08-18

## 2025-08-11 RX ORDER — CARVEDILOL 6.25 MG/1
12.5 TABLET ORAL 2 TIMES DAILY WITH MEALS
COMMUNITY
Start: 2025-08-11

## 2025-08-11 RX ORDER — MEPERIDINE HYDROCHLORIDE 25 MG/ML
25 INJECTION INTRAMUSCULAR; INTRAVENOUS; SUBCUTANEOUS
OUTPATIENT
Start: 2025-08-18

## 2025-08-11 RX ORDER — HEPARIN SODIUM (PORCINE) LOCK FLUSH IV SOLN 100 UNIT/ML 100 UNIT/ML
5 SOLUTION INTRAVENOUS
OUTPATIENT
Start: 2025-08-18

## 2025-08-11 RX ORDER — DIPHENHYDRAMINE HYDROCHLORIDE 50 MG/ML
50 INJECTION, SOLUTION INTRAMUSCULAR; INTRAVENOUS
Start: 2025-08-18

## 2025-08-11 RX ORDER — DOXEPIN 6 MG/1
6 TABLET, FILM COATED ORAL
Qty: 7 TABLET | Refills: 0 | Status: SHIPPED | OUTPATIENT
Start: 2025-08-11 | End: 2025-08-11

## 2025-08-11 RX ORDER — HEPARIN SODIUM,PORCINE 10 UNIT/ML
5-20 VIAL (ML) INTRAVENOUS DAILY PRN
OUTPATIENT
Start: 2025-08-18

## 2025-08-11 RX ORDER — NICOTINE 21 MG/24HR
1 PATCH, TRANSDERMAL 24 HOURS TRANSDERMAL EVERY 24 HOURS
Qty: 42 PATCH | Refills: 0 | Status: SHIPPED | OUTPATIENT
Start: 2025-08-11 | End: 2025-09-22

## 2025-08-11 RX ORDER — NICOTINE 21 MG/24HR
1 PATCH, TRANSDERMAL 24 HOURS TRANSDERMAL EVERY 24 HOURS
Qty: 14 PATCH | Refills: 0 | Status: SHIPPED | OUTPATIENT
Start: 2025-09-22 | End: 2025-10-06

## 2025-08-11 RX ORDER — ALBUTEROL SULFATE 0.83 MG/ML
2.5 SOLUTION RESPIRATORY (INHALATION)
OUTPATIENT
Start: 2025-08-18

## 2025-08-11 RX ADMIN — OXYCODONE HYDROCHLORIDE 10 MG: 5 SOLUTION ORAL at 08:18

## 2025-08-11 RX ADMIN — PANTOPRAZOLE SODIUM 40 MG: 40 TABLET, DELAYED RELEASE ORAL at 08:19

## 2025-08-11 RX ADMIN — SENNOSIDES 8.6 MG: 8.6 TABLET, FILM COATED ORAL at 08:19

## 2025-08-11 RX ADMIN — CYANOCOBALAMIN TAB 1000 MCG 1000 MCG: 1000 TAB at 08:19

## 2025-08-11 RX ADMIN — ALPRAZOLAM 0.5 MG: 0.5 TABLET ORAL at 08:19

## 2025-08-11 RX ADMIN — FENTANYL 1 PATCH: 25 PATCH TRANSDERMAL at 10:20

## 2025-08-11 RX ADMIN — CARVEDILOL 12.5 MG: 12.5 TABLET, FILM COATED ORAL at 08:19

## 2025-08-11 RX ADMIN — OXYCODONE HYDROCHLORIDE 10 MG: 5 SOLUTION ORAL at 12:02

## 2025-08-11 RX ADMIN — ONDANSETRON 4 MG: 4 TABLET, ORALLY DISINTEGRATING ORAL at 08:34

## 2025-08-11 RX ADMIN — SUCRALFATE 1 G: 1 SUSPENSION ORAL at 08:19

## 2025-08-11 RX ADMIN — SUCRALFATE 1 G: 1 SUSPENSION ORAL at 11:47

## 2025-08-11 RX ADMIN — OXYCODONE HYDROCHLORIDE 10 MG: 5 SOLUTION ORAL at 02:01

## 2025-08-11 RX ADMIN — DEXTROAMPHETAMINE SACCHARATE, AMPHETAMINE ASPARTATE, DEXTROAMPHETAMINE SULFATE AND AMPHETAMINE SULFATE 20 MG: 2.5; 2.5; 2.5; 2.5 TABLET ORAL at 08:19

## 2025-08-11 ASSESSMENT — ACTIVITIES OF DAILY LIVING (ADL)
ADLS_ACUITY_SCORE: 32

## 2025-08-12 ENCOUNTER — TELEPHONE (OUTPATIENT)
Dept: INTERNAL MEDICINE | Facility: CLINIC | Age: 55
End: 2025-08-12
Payer: COMMERCIAL

## 2025-08-12 ENCOUNTER — PATIENT OUTREACH (OUTPATIENT)
Dept: CARE COORDINATION | Facility: CLINIC | Age: 55
End: 2025-08-12
Payer: COMMERCIAL

## 2025-08-13 ENCOUNTER — PATIENT OUTREACH (OUTPATIENT)
Dept: CARE COORDINATION | Facility: CLINIC | Age: 55
End: 2025-08-13
Payer: COMMERCIAL

## 2025-08-13 ENCOUNTER — MYC REFILL (OUTPATIENT)
Dept: INTERNAL MEDICINE | Facility: CLINIC | Age: 55
End: 2025-08-13
Payer: COMMERCIAL

## 2025-08-13 DIAGNOSIS — F41.9 CHRONIC ANXIETY: ICD-10-CM

## 2025-08-13 DIAGNOSIS — F90.0 ADHD (ATTENTION DEFICIT HYPERACTIVITY DISORDER), INATTENTIVE TYPE: ICD-10-CM

## 2025-08-13 LAB — VIT B1 PYROPHOSHATE BLD-SCNC: 196 NMOL/L

## 2025-08-13 RX ORDER — ALPRAZOLAM 0.5 MG
TABLET ORAL
Qty: 60 TABLET | Refills: 0 | Status: SHIPPED | OUTPATIENT
Start: 2025-08-13

## 2025-08-13 RX ORDER — DEXTROAMPHETAMINE SACCHARATE, AMPHETAMINE ASPARTATE, DEXTROAMPHETAMINE SULFATE AND AMPHETAMINE SULFATE 5; 5; 5; 5 MG/1; MG/1; MG/1; MG/1
TABLET ORAL
Qty: 30 TABLET | Refills: 0 | Status: SHIPPED | OUTPATIENT
Start: 2025-08-13

## 2025-08-13 ASSESSMENT — ACTIVITIES OF DAILY LIVING (ADL): DEPENDENT_IADLS:: TRANSPORTATION

## 2025-08-18 ENCOUNTER — TELEPHONE (OUTPATIENT)
Dept: INTERNAL MEDICINE | Facility: CLINIC | Age: 55
End: 2025-08-18
Payer: COMMERCIAL

## 2025-08-18 RX ORDER — NALOXONE HCL 8 MG/.1ML
8 SPRAY NASAL PRN
Qty: 0.2 EACH | Refills: 0 | COMMUNITY
Start: 2025-08-18

## (undated) DEVICE — DRAPE C-ARM

## (undated) DEVICE — ENDO SYSTEM WATER BOTTLE & TUBING W/CO2 FILTER 00711549

## (undated) DEVICE — SOL WATER IRRIG 500ML BOTTLE 2F7113

## (undated) DEVICE — DECANTER BAG 2002S

## (undated) DEVICE — DRAPE SHEET REV FOLD 3/4 9349

## (undated) DEVICE — SYR 10ML LL W/O NDL 302995

## (undated) DEVICE — GUIDEWIRE VASC GLIDE GOLD 0.018"X180CM 70DEG GM1814

## (undated) DEVICE — GLOVE PROTEXIS POWDER FREE SMT 7.5  2D72PT75X

## (undated) DEVICE — COVER EASY EQUIP BAG W/BAND LATEX FREE EZ-28

## (undated) DEVICE — PREP CHLORAPREP 26ML TINTED HI-LITE ORANGE 930815

## (undated) DEVICE — BUR MATCHSTICK 3MM ANSPACH L-8NS-G1

## (undated) DEVICE — DRAPE IOBAN INCISE 13X13" 6640EZ

## (undated) DEVICE — GOWN IMPERVIOUS 2XL BLUE

## (undated) DEVICE — TUBE GASTROSTOMY MIC LOW VOL ENFIT 16FR SIL 8100-16LV

## (undated) DEVICE — ENDO TROCAR BLUNT TIP KII BALLOON 12X100MM C0R47

## (undated) DEVICE — LINEN TOWEL PACK X5 5464

## (undated) DEVICE — ENDO FORCEP ENDOJAW BIOPSY 2.8MMX160CM FB-220K

## (undated) DEVICE — NDL COUNTER 20CT 31142493

## (undated) DEVICE — GLOVE PROTEXIS W/NEU-THERA 8.5  2D73TE85

## (undated) DEVICE — PITCHER STERILE 1000ML  SSK9004A

## (undated) DEVICE — TUBING SUCTION 10'X3/16" N510

## (undated) DEVICE — GOWN XLG DISP 9545

## (undated) DEVICE — Device

## (undated) DEVICE — SU MONOCRYL 4-0 P-3 18" UND Y494G

## (undated) DEVICE — SOL WATER IRRIG 1000ML BOTTLE 2F7114

## (undated) DEVICE — KIT CONNECTOR FOR OLYMPUS ENDOSCOPES DEFENDO 100310

## (undated) DEVICE — ENDO TROCAR FIRST ENTRY KII FIOS Z-THRD 05X100MM CTF03

## (undated) DEVICE — PAD CHUX UNDERPAD 23X24" 7136

## (undated) DEVICE — SU ETHILON 2-0 FS 18" 664H

## (undated) DEVICE — SNARE CAPIVATOR ROUND COLD SNR BX10 M00561101

## (undated) DEVICE — SPONGE COTTONOID 1/2X1/2" 80-1400

## (undated) DEVICE — DRSG BIOPATCH GERMICIDAL SPLIT SPONGE 4MM MED 4150

## (undated) DEVICE — SU MONOCRYL 4-0 PS-2 18" UND Y496G

## (undated) DEVICE — SUCTION MANIFOLD NEPTUNE 2 SYS 4 PORT 0702-020-000

## (undated) DEVICE — ENDO DEVICE LOCKING AND BIOPSY CAP M00545261

## (undated) DEVICE — PACK SET-UP STD 9102

## (undated) DEVICE — SPECIMEN CONTAINER 3OZ W/FORMALIN 59901

## (undated) DEVICE — STOCKING SLEEVE COMPRESSION CALF MED

## (undated) DEVICE — ENDO BITE BLOCK ADULT OMNI-BLOC

## (undated) DEVICE — NDL SPINAL 18GA 3.5" 405184

## (undated) DEVICE — KIT ENDO FIRST STEP DISINFECTANT 200ML W/POUCH EP-4

## (undated) DEVICE — NDL ECLIPSE 22GA 1.5"

## (undated) DEVICE — SU PDS II 4-0 SH 27" Z315H

## (undated) DEVICE — PEN MARKING SKIN

## (undated) DEVICE — SOL ISOPROPYL ALCOHOL USP 70% 16OZ  NDC10565-002-16 D0022

## (undated) DEVICE — LABEL MEDICATION SYSTEM 3303-P

## (undated) DEVICE — KIT INTRODUCER FLUENT MICRO 5FRX10CM ECHO TIP KIT-038-04

## (undated) DEVICE — CAST STOCKINETTE 3"

## (undated) DEVICE — COVER ULTRASOUND PROBE W/GEL FLEXI-FEEL 6"X58" LF  25-FF658

## (undated) DEVICE — SPONGE LAP 18X18" 1515

## (undated) DEVICE — SUCTION MANIFOLD NEPTUNE 2 SYS 1 PORT 702-025-000

## (undated) DEVICE — BONE WAX 2.5GM W31G

## (undated) DEVICE — ESU GROUND PAD UNIVERSAL W/O CORD

## (undated) DEVICE — DRSG TEGADERM IV ADVANCED 3.5X4.5" 1685

## (undated) DEVICE — BASIN SET MINOR DISP

## (undated) DEVICE — DRAIN JACKSON PRATT 07MM FLAT 3/4 PERF

## (undated) DEVICE — SU DERMABOND ADVANCED .7ML DNX12

## (undated) DEVICE — INTRODUCER CATH TAUT 2.0MMX1.6MMX7.6CM PI-63

## (undated) DEVICE — ENDO TUBING CO2 SMARTCAP STERILE DISP 100145CO2EXT

## (undated) DEVICE — PACK ENDOSCOPY GI CUSTOM UMMC

## (undated) DEVICE — ADH FLOSEAL W/HUMAN THROMBIN 5ML 1501825

## (undated) DEVICE — CAP LUER LOCK MALE/FEMALE DUAL 2C6250

## (undated) DEVICE — SOL WATER IRRIG 1000ML BOTTLE 07139-09

## (undated) DEVICE — TUBING SUCTION 12"X1/4" N612

## (undated) DEVICE — GELFOAM 7X12MM

## (undated) DEVICE — SOL NACL 0.9% IRRIG 1000ML BOTTLE 2F7124

## (undated) DEVICE — KNIFE HANDLE W/15 BLADE 371615

## (undated) DEVICE — ENDO NDL BALL TIP ULTRASOUND 19GA ECHO-19

## (undated) DEVICE — LIGHT HANDLE X1 31140133

## (undated) DEVICE — DRAPE POUCH INSTRUMENT 1018

## (undated) DEVICE — CUP AND LID 2PK 2OZ STERILE  SSK9006A

## (undated) DEVICE — ANTIFOG SOLUTION W/FOAM PAD 31142527

## (undated) DEVICE — TUBING SUCTION MEDI-VAC 1/4"X20' N620A

## (undated) DEVICE — TUBING SUCTION MEDI-VAC SOFT 3/16"X20' N520A

## (undated) DEVICE — SPONGE RAY-TEC 4X8" 7318

## (undated) DEVICE — GUIDEWIRE BENSON STR EXCHANGE .035X260CM TSF-35-260-BH

## (undated) DEVICE — STPL ENDO RELOADECHELON 60X2.0MM GRAY ECR60M

## (undated) DEVICE — ESU LIGASURE IMPACT OPEN SEALER/DVDR CVD LG JAW LF4418

## (undated) DEVICE — INTR PEELAWAY 20FRX20CM G06445 PLVW-20.0-38

## (undated) DEVICE — DRAPE MAYO STAND 23X54 8337

## (undated) DEVICE — PACK CENTRAL LINE INSERTION SAN32CLFCG

## (undated) DEVICE — SU ETHILON 3-0 PS-2 18" 1669H

## (undated) DEVICE — PREP CHLORAPREP 26ML TINTED ORANGE  260815

## (undated) DEVICE — SPONGE KITTNER 31001010

## (undated) DEVICE — DRAIN JACKSON PRATT RESERVOIR 100ML SU130-1305

## (undated) DEVICE — BLADE KNIFE SURG 10 371110

## (undated) DEVICE — ENDO CAP AND TUBING STERILE FOR ENDOGATOR  100130

## (undated) DEVICE — ADH LIQUID MASTISOL TOPICAL VIAL 2-3ML 0523-48

## (undated) DEVICE — GLOVE PROTEXIS BLUE W/NEU-THERA 8.5  2D73EB85

## (undated) DEVICE — DRSG TELFA 3X8" 1238

## (undated) DEVICE — BLADE CLIPPER 4406

## (undated) DEVICE — DRAPE IOBAN INCISE 23X17" 6650EZ

## (undated) DEVICE — DRSG PRIMAPORE 03 1/8X6" 66000318

## (undated) DEVICE — LINEN GOWN XLG 5407

## (undated) DEVICE — SU VICRYL 3-0 SH 27" UND J416H

## (undated) DEVICE — GLOVE PROTEXIS W/NEU-THERA 7.5  2D73TE75

## (undated) DEVICE — LINEN TOWEL PACK X6 WHITE 5487

## (undated) DEVICE — WIRE GUIDE NITINOL FLOPPY 6.0CM PLT TIP .018X60CM M001207110

## (undated) DEVICE — LINEN TOWEL PACK X30 5481

## (undated) DEVICE — JELLY LUBRICATING SURGILUBE 2OZ TUBE

## (undated) DEVICE — ENDO TROCAR SLEEVE KII Z-THREADED 05X100MM CTS02

## (undated) DEVICE — CATH VA INTR 10FRX14CM KIT

## (undated) DEVICE — BASIN SET SINGLE STERILE 13752-624

## (undated) DEVICE — ENDO PROBE COVER ULTRASOUND BALLOON LATEX  MAJ-249

## (undated) DEVICE — SU VICRYL 3-0 SH 27" J316H

## (undated) DEVICE — DRAPE C-ARM W/STRAPS 42X72" 07-CA104

## (undated) DEVICE — TUBE GASTROSTOMY PEG SET 24FR G22636 PEG-24-PULL-S

## (undated) DEVICE — PACK AB HYST II

## (undated) DEVICE — DRAPE SHEET MED 44X70" 9355

## (undated) DEVICE — KIT ENDO TURNOVER/PROCEDURE CARRY-ON 101822

## (undated) DEVICE — ENDO TROCAR SLEEVE KII Z-THREADED 12X100MM CTS22

## (undated) DEVICE — SYR 10ML SLIP TIP W/O NDL 303134

## (undated) DEVICE — INTRODUCER KIT GASTROSTOMY MIC G 22FR 98433

## (undated) DEVICE — SYR 10ML FINGER CONTROL W/O NDL 309695

## (undated) DEVICE — NDL INSUFFLATION 13GA 150MM C2202

## (undated) DEVICE — SU PDS II 0 TP-1 60" Z991G

## (undated) DEVICE — ESU ELEC BLADE 2.75" COATED/INSULATED E1455

## (undated) DEVICE — DRSG GAUZE 4X4" TRAY 6939

## (undated) DEVICE — SYR 30ML SLIP TIP W/O NDL 302833

## (undated) RX ORDER — FENTANYL CITRATE 50 UG/ML
INJECTION, SOLUTION INTRAMUSCULAR; INTRAVENOUS
Status: DISPENSED
Start: 2019-01-08

## (undated) RX ORDER — LIDOCAINE HYDROCHLORIDE 10 MG/ML
INJECTION, SOLUTION EPIDURAL; INFILTRATION; INTRACAUDAL; PERINEURAL
Status: DISPENSED
Start: 2017-09-22

## (undated) RX ORDER — FENTANYL CITRATE 50 UG/ML
INJECTION, SOLUTION INTRAMUSCULAR; INTRAVENOUS
Status: DISPENSED
Start: 2023-06-07

## (undated) RX ORDER — PROPOFOL 10 MG/ML
INJECTION, EMULSION INTRAVENOUS
Status: DISPENSED
Start: 2017-02-15

## (undated) RX ORDER — CLINDAMYCIN PHOSPHATE 900 MG/50ML
INJECTION, SOLUTION INTRAVENOUS
Status: DISPENSED
Start: 2018-09-18

## (undated) RX ORDER — LIDOCAINE HYDROCHLORIDE 10 MG/ML
INJECTION, SOLUTION EPIDURAL; INFILTRATION; INTRACAUDAL; PERINEURAL
Status: DISPENSED
Start: 2019-05-16

## (undated) RX ORDER — FENTANYL CITRATE 50 UG/ML
INJECTION, SOLUTION INTRAMUSCULAR; INTRAVENOUS
Status: DISPENSED
Start: 2022-08-10

## (undated) RX ORDER — CLINDAMYCIN PHOSPHATE 150 MG/ML
INJECTION, SOLUTION INTRAVENOUS
Status: DISPENSED
Start: 2020-08-03

## (undated) RX ORDER — HEPARIN SODIUM,PORCINE 10 UNIT/ML
VIAL (ML) INTRAVENOUS
Status: DISPENSED
Start: 2023-06-08

## (undated) RX ORDER — LIDOCAINE HYDROCHLORIDE 10 MG/ML
INJECTION, SOLUTION EPIDURAL; INFILTRATION; INTRACAUDAL; PERINEURAL
Status: DISPENSED
Start: 2022-05-09

## (undated) RX ORDER — SODIUM CHLORIDE 9 MG/ML
INJECTION, SOLUTION INTRAVENOUS
Status: DISPENSED
Start: 2023-09-19

## (undated) RX ORDER — CLINDAMYCIN PHOSPHATE 900 MG/50ML
INJECTION, SOLUTION INTRAVENOUS
Status: DISPENSED
Start: 2018-08-02

## (undated) RX ORDER — LIDOCAINE HYDROCHLORIDE 10 MG/ML
INJECTION, SOLUTION EPIDURAL; INFILTRATION; INTRACAUDAL; PERINEURAL
Status: DISPENSED
Start: 2018-01-15

## (undated) RX ORDER — DEXAMETHASONE SODIUM PHOSPHATE 4 MG/ML
INJECTION, SOLUTION INTRA-ARTICULAR; INTRALESIONAL; INTRAMUSCULAR; INTRAVENOUS; SOFT TISSUE
Status: DISPENSED
Start: 2021-02-24

## (undated) RX ORDER — LIDOCAINE HYDROCHLORIDE 20 MG/ML
SOLUTION OROPHARYNGEAL
Status: DISPENSED
Start: 2023-07-06

## (undated) RX ORDER — FENTANYL CITRATE 50 UG/ML
INJECTION, SOLUTION INTRAMUSCULAR; INTRAVENOUS
Status: DISPENSED
Start: 2024-08-12

## (undated) RX ORDER — ONDANSETRON 2 MG/ML
INJECTION INTRAMUSCULAR; INTRAVENOUS
Status: DISPENSED
Start: 2021-02-24

## (undated) RX ORDER — SODIUM CHLORIDE 9 MG/ML
INJECTION, SOLUTION INTRAVENOUS
Status: DISPENSED
Start: 2018-09-18

## (undated) RX ORDER — HEPARIN SODIUM,PORCINE 10 UNIT/ML
VIAL (ML) INTRAVENOUS
Status: DISPENSED
Start: 2023-06-07

## (undated) RX ORDER — ONDANSETRON 2 MG/ML
INJECTION INTRAMUSCULAR; INTRAVENOUS
Status: DISPENSED
Start: 2019-01-08

## (undated) RX ORDER — SODIUM CHLORIDE 9 MG/ML
INJECTION, SOLUTION INTRAVENOUS
Status: DISPENSED
Start: 2020-04-14

## (undated) RX ORDER — FENTANYL CITRATE 50 UG/ML
INJECTION, SOLUTION INTRAMUSCULAR; INTRAVENOUS
Status: DISPENSED
Start: 2023-07-10

## (undated) RX ORDER — DEXMEDETOMIDINE HYDROCHLORIDE 100 UG/ML
INJECTION, SOLUTION INTRAVENOUS
Status: DISPENSED
Start: 2017-02-15

## (undated) RX ORDER — HEPARIN SODIUM,PORCINE 10 UNIT/ML
VIAL (ML) INTRAVENOUS
Status: DISPENSED
Start: 2024-06-19

## (undated) RX ORDER — LIDOCAINE HYDROCHLORIDE 10 MG/ML
INJECTION, SOLUTION INFILTRATION; PERINEURAL
Status: DISPENSED
Start: 2018-10-13

## (undated) RX ORDER — LIDOCAINE HYDROCHLORIDE 10 MG/ML
INJECTION, SOLUTION EPIDURAL; INFILTRATION; INTRACAUDAL; PERINEURAL
Status: DISPENSED
Start: 2022-01-13

## (undated) RX ORDER — PROPOFOL 10 MG/ML
INJECTION, EMULSION INTRAVENOUS
Status: DISPENSED
Start: 2021-02-24

## (undated) RX ORDER — FENTANYL CITRATE-0.9 % NACL/PF 10 MCG/ML
PLASTIC BAG, INJECTION (ML) INTRAVENOUS
Status: DISPENSED
Start: 2023-09-30

## (undated) RX ORDER — GLYCOPYRROLATE 0.2 MG/ML
INJECTION INTRAMUSCULAR; INTRAVENOUS
Status: DISPENSED
Start: 2017-02-15

## (undated) RX ORDER — FENTANYL CITRATE 50 UG/ML
INJECTION, SOLUTION INTRAMUSCULAR; INTRAVENOUS
Status: DISPENSED
Start: 2017-02-15

## (undated) RX ORDER — HEPARIN SODIUM,PORCINE 10 UNIT/ML
VIAL (ML) INTRAVENOUS
Status: DISPENSED
Start: 2018-09-18

## (undated) RX ORDER — HYDROMORPHONE HYDROCHLORIDE 1 MG/ML
INJECTION, SOLUTION INTRAMUSCULAR; INTRAVENOUS; SUBCUTANEOUS
Status: DISPENSED
Start: 2023-09-30

## (undated) RX ORDER — FENTANYL CITRATE 50 UG/ML
INJECTION, SOLUTION INTRAMUSCULAR; INTRAVENOUS
Status: DISPENSED
Start: 2023-09-30

## (undated) RX ORDER — FENTANYL CITRATE 50 UG/ML
INJECTION, SOLUTION INTRAMUSCULAR; INTRAVENOUS
Status: DISPENSED
Start: 2019-08-07

## (undated) RX ORDER — BUPIVACAINE HYDROCHLORIDE AND EPINEPHRINE 2.5; 5 MG/ML; UG/ML
INJECTION, SOLUTION EPIDURAL; INFILTRATION; INTRACAUDAL; PERINEURAL
Status: DISPENSED
Start: 2023-09-30

## (undated) RX ORDER — HEPARIN SODIUM,PORCINE 10 UNIT/ML
VIAL (ML) INTRAVENOUS
Status: DISPENSED
Start: 2024-09-11

## (undated) RX ORDER — HEPARIN SODIUM,PORCINE 10 UNIT/ML
VIAL (ML) INTRAVENOUS
Status: DISPENSED
Start: 2024-01-25

## (undated) RX ORDER — HEPARIN SODIUM,PORCINE 10 UNIT/ML
VIAL (ML) INTRAVENOUS
Status: DISPENSED
Start: 2019-05-16

## (undated) RX ORDER — FENTANYL CITRATE 50 UG/ML
INJECTION, SOLUTION INTRAMUSCULAR; INTRAVENOUS
Status: DISPENSED
Start: 2021-02-24

## (undated) RX ORDER — FENTANYL CITRATE 50 UG/ML
INJECTION, SOLUTION INTRAMUSCULAR; INTRAVENOUS
Status: DISPENSED
Start: 2023-07-07

## (undated) RX ORDER — HEPARIN SODIUM (PORCINE) LOCK FLUSH IV SOLN 100 UNIT/ML 100 UNIT/ML
SOLUTION INTRAVENOUS
Status: DISPENSED
Start: 2021-05-07

## (undated) RX ORDER — LIDOCAINE HYDROCHLORIDE 10 MG/ML
INJECTION, SOLUTION EPIDURAL; INFILTRATION; INTRACAUDAL; PERINEURAL
Status: DISPENSED
Start: 2018-10-14

## (undated) RX ORDER — GABAPENTIN 300 MG/1
CAPSULE ORAL
Status: DISPENSED
Start: 2018-08-02

## (undated) RX ORDER — FENTANYL CITRATE 50 UG/ML
INJECTION, SOLUTION INTRAMUSCULAR; INTRAVENOUS
Status: DISPENSED
Start: 2020-09-04

## (undated) RX ORDER — LIDOCAINE HYDROCHLORIDE 10 MG/ML
INJECTION, SOLUTION EPIDURAL; INFILTRATION; INTRACAUDAL; PERINEURAL
Status: DISPENSED
Start: 2018-01-17

## (undated) RX ORDER — FENTANYL CITRATE-0.9 % NACL/PF 10 MCG/ML
PLASTIC BAG, INJECTION (ML) INTRAVENOUS
Status: DISPENSED
Start: 2021-02-24

## (undated) RX ORDER — ACETAMINOPHEN 325 MG/1
TABLET ORAL
Status: DISPENSED
Start: 2018-08-02

## (undated) RX ORDER — LIDOCAINE HYDROCHLORIDE 10 MG/ML
INJECTION, SOLUTION EPIDURAL; INFILTRATION; INTRACAUDAL; PERINEURAL
Status: DISPENSED
Start: 2019-10-01

## (undated) RX ORDER — FENTANYL CITRATE 50 UG/ML
INJECTION, SOLUTION INTRAMUSCULAR; INTRAVENOUS
Status: DISPENSED
Start: 2022-07-13

## (undated) RX ORDER — LIDOCAINE HYDROCHLORIDE 10 MG/ML
INJECTION, SOLUTION EPIDURAL; INFILTRATION; INTRACAUDAL; PERINEURAL
Status: DISPENSED
Start: 2023-06-07

## (undated) RX ORDER — HEPARIN SODIUM,PORCINE 10 UNIT/ML
VIAL (ML) INTRAVENOUS
Status: DISPENSED
Start: 2022-04-13

## (undated) RX ORDER — HEPARIN SODIUM,PORCINE 10 UNIT/ML
VIAL (ML) INTRAVENOUS
Status: DISPENSED
Start: 2022-07-13

## (undated) RX ORDER — LIDOCAINE HYDROCHLORIDE 10 MG/ML
INJECTION, SOLUTION EPIDURAL; INFILTRATION; INTRACAUDAL; PERINEURAL
Status: DISPENSED
Start: 2022-07-13

## (undated) RX ORDER — HEPARIN SODIUM (PORCINE) LOCK FLUSH IV SOLN 100 UNIT/ML 100 UNIT/ML
SOLUTION INTRAVENOUS
Status: DISPENSED
Start: 2023-09-19

## (undated) RX ORDER — FENTANYL CITRATE 50 UG/ML
INJECTION, SOLUTION INTRAMUSCULAR; INTRAVENOUS
Status: DISPENSED
Start: 2018-01-15

## (undated) RX ORDER — LIDOCAINE HYDROCHLORIDE 10 MG/ML
INJECTION, SOLUTION EPIDURAL; INFILTRATION; INTRACAUDAL; PERINEURAL
Status: DISPENSED
Start: 2020-07-30

## (undated) RX ORDER — CLINDAMYCIN PHOSPHATE 900 MG/50ML
INJECTION, SOLUTION INTRAVENOUS
Status: DISPENSED
Start: 2021-05-07

## (undated) RX ORDER — FENTANYL CITRATE 50 UG/ML
INJECTION, SOLUTION INTRAMUSCULAR; INTRAVENOUS
Status: DISPENSED
Start: 2024-06-19

## (undated) RX ORDER — LIDOCAINE HYDROCHLORIDE 20 MG/ML
INJECTION, SOLUTION EPIDURAL; INFILTRATION; INTRACAUDAL; PERINEURAL
Status: DISPENSED
Start: 2017-12-19

## (undated) RX ORDER — LIDOCAINE HYDROCHLORIDE 20 MG/ML
INJECTION, SOLUTION EPIDURAL; INFILTRATION; INTRACAUDAL; PERINEURAL
Status: DISPENSED
Start: 2019-01-08

## (undated) RX ORDER — FENTANYL CITRATE 50 UG/ML
INJECTION, SOLUTION INTRAMUSCULAR; INTRAVENOUS
Status: DISPENSED
Start: 2021-05-07

## (undated) RX ORDER — DEXAMETHASONE SODIUM PHOSPHATE 10 MG/ML
INJECTION INTRAMUSCULAR; INTRAVENOUS
Status: DISPENSED
Start: 2017-02-15

## (undated) RX ORDER — FENTANYL CITRATE 50 UG/ML
INJECTION, SOLUTION INTRAMUSCULAR; INTRAVENOUS
Status: DISPENSED
Start: 2017-09-22

## (undated) RX ORDER — LIDOCAINE HYDROCHLORIDE 10 MG/ML
INJECTION, SOLUTION EPIDURAL; INFILTRATION; INTRACAUDAL; PERINEURAL
Status: DISPENSED
Start: 2024-06-19

## (undated) RX ORDER — SODIUM CHLORIDE 9 MG/ML
INJECTION, SOLUTION INTRAVENOUS
Status: DISPENSED
Start: 2022-08-10

## (undated) RX ORDER — LIDOCAINE HYDROCHLORIDE 10 MG/ML
INJECTION, SOLUTION EPIDURAL; INFILTRATION; INTRACAUDAL; PERINEURAL
Status: DISPENSED
Start: 2019-05-15

## (undated) RX ORDER — ONDANSETRON 2 MG/ML
INJECTION INTRAMUSCULAR; INTRAVENOUS
Status: DISPENSED
Start: 2023-09-19

## (undated) RX ORDER — SODIUM CHLORIDE 9 MG/ML
INJECTION, SOLUTION INTRAVENOUS
Status: DISPENSED
Start: 2021-03-19

## (undated) RX ORDER — FENTANYL CITRATE 50 UG/ML
INJECTION, SOLUTION INTRAMUSCULAR; INTRAVENOUS
Status: DISPENSED
Start: 2024-09-11

## (undated) RX ORDER — HEPARIN SODIUM (PORCINE) LOCK FLUSH IV SOLN 100 UNIT/ML 100 UNIT/ML
SOLUTION INTRAVENOUS
Status: DISPENSED
Start: 2021-03-23

## (undated) RX ORDER — LIDOCAINE HYDROCHLORIDE 10 MG/ML
INJECTION, SOLUTION EPIDURAL; INFILTRATION; INTRACAUDAL; PERINEURAL
Status: DISPENSED
Start: 2022-05-10

## (undated) RX ORDER — HEPARIN SODIUM (PORCINE) LOCK FLUSH IV SOLN 100 UNIT/ML 100 UNIT/ML
SOLUTION INTRAVENOUS
Status: DISPENSED
Start: 2018-08-02

## (undated) RX ORDER — PROPOFOL 10 MG/ML
INJECTION, EMULSION INTRAVENOUS
Status: DISPENSED
Start: 2023-09-30

## (undated) RX ORDER — LIDOCAINE HYDROCHLORIDE 10 MG/ML
INJECTION, SOLUTION EPIDURAL; INFILTRATION; INTRACAUDAL; PERINEURAL
Status: DISPENSED
Start: 2019-08-07

## (undated) RX ORDER — ACETAMINOPHEN 325 MG/1
TABLET ORAL
Status: DISPENSED
Start: 2020-02-21

## (undated) RX ORDER — LIDOCAINE HYDROCHLORIDE 10 MG/ML
INJECTION, SOLUTION EPIDURAL; INFILTRATION; INTRACAUDAL; PERINEURAL
Status: DISPENSED
Start: 2019-01-05

## (undated) RX ORDER — LIDOCAINE HYDROCHLORIDE 10 MG/ML
INJECTION, SOLUTION EPIDURAL; INFILTRATION; INTRACAUDAL; PERINEURAL
Status: DISPENSED
Start: 2020-04-14

## (undated) RX ORDER — ACETAMINOPHEN 325 MG/1
TABLET ORAL
Status: DISPENSED
Start: 2017-12-19

## (undated) RX ORDER — HYDRALAZINE HYDROCHLORIDE 20 MG/ML
INJECTION INTRAMUSCULAR; INTRAVENOUS
Status: DISPENSED
Start: 2023-09-30

## (undated) RX ORDER — DEXTROSE MONOHYDRATE, SODIUM CHLORIDE, AND POTASSIUM CHLORIDE 50; 1.49; 4.5 G/1000ML; G/1000ML; G/1000ML
INJECTION, SOLUTION INTRAVENOUS
Status: DISPENSED
Start: 2023-09-30

## (undated) RX ORDER — ONDANSETRON 2 MG/ML
INJECTION INTRAMUSCULAR; INTRAVENOUS
Status: DISPENSED
Start: 2022-05-12

## (undated) RX ORDER — NEOSTIGMINE METHYLSULFATE 1 MG/ML
VIAL (ML) INJECTION
Status: DISPENSED
Start: 2017-02-15

## (undated) RX ORDER — CEFAZOLIN SODIUM 2 G/100ML
INJECTION, SOLUTION INTRAVENOUS
Status: DISPENSED
Start: 2023-09-19

## (undated) RX ORDER — HYDROMORPHONE HYDROCHLORIDE 1 MG/ML
INJECTION, SOLUTION INTRAMUSCULAR; INTRAVENOUS; SUBCUTANEOUS
Status: DISPENSED
Start: 2021-02-24

## (undated) RX ORDER — FENTANYL CITRATE 50 UG/ML
INJECTION, SOLUTION INTRAMUSCULAR; INTRAVENOUS
Status: DISPENSED
Start: 2021-02-05

## (undated) RX ORDER — LIDOCAINE HYDROCHLORIDE 20 MG/ML
INJECTION, SOLUTION EPIDURAL; INFILTRATION; INTRACAUDAL; PERINEURAL
Status: DISPENSED
Start: 2021-02-24

## (undated) RX ORDER — CEFAZOLIN SODIUM 1 G/3ML
INJECTION, POWDER, FOR SOLUTION INTRAMUSCULAR; INTRAVENOUS
Status: DISPENSED
Start: 2023-09-30

## (undated) RX ORDER — LIDOCAINE HYDROCHLORIDE 10 MG/ML
INJECTION, SOLUTION EPIDURAL; INFILTRATION; INTRACAUDAL; PERINEURAL
Status: DISPENSED
Start: 2022-01-10

## (undated) RX ORDER — DIPHENHYDRAMINE HYDROCHLORIDE 50 MG/ML
INJECTION INTRAMUSCULAR; INTRAVENOUS
Status: DISPENSED
Start: 2024-01-25

## (undated) RX ORDER — HEPARIN SODIUM (PORCINE) LOCK FLUSH IV SOLN 100 UNIT/ML 100 UNIT/ML
SOLUTION INTRAVENOUS
Status: DISPENSED
Start: 2017-12-19

## (undated) RX ORDER — FENTANYL CITRATE 50 UG/ML
INJECTION, SOLUTION INTRAMUSCULAR; INTRAVENOUS
Status: DISPENSED
Start: 2023-11-17

## (undated) RX ORDER — HEPARIN SODIUM,PORCINE 10 UNIT/ML
VIAL (ML) INTRAVENOUS
Status: DISPENSED
Start: 2023-11-17

## (undated) RX ORDER — HEPARIN SODIUM (PORCINE) LOCK FLUSH IV SOLN 100 UNIT/ML 100 UNIT/ML
SOLUTION INTRAVENOUS
Status: DISPENSED
Start: 2020-02-21

## (undated) RX ORDER — FENTANYL CITRATE 50 UG/ML
INJECTION, SOLUTION INTRAMUSCULAR; INTRAVENOUS
Status: DISPENSED
Start: 2022-01-13

## (undated) RX ORDER — LIDOCAINE HYDROCHLORIDE 10 MG/ML
INJECTION, SOLUTION EPIDURAL; INFILTRATION; INTRACAUDAL; PERINEURAL
Status: DISPENSED
Start: 2024-01-22

## (undated) RX ORDER — LIDOCAINE HYDROCHLORIDE 10 MG/ML
INJECTION, SOLUTION EPIDURAL; INFILTRATION; INTRACAUDAL; PERINEURAL
Status: DISPENSED
Start: 2021-05-07

## (undated) RX ORDER — LIDOCAINE HYDROCHLORIDE 10 MG/ML
INJECTION, SOLUTION EPIDURAL; INFILTRATION; INTRACAUDAL; PERINEURAL
Status: DISPENSED
Start: 2024-09-05

## (undated) RX ORDER — HEPARIN SODIUM,PORCINE 10 UNIT/ML
VIAL (ML) INTRAVENOUS
Status: DISPENSED
Start: 2024-08-12

## (undated) RX ORDER — LIDOCAINE HYDROCHLORIDE 10 MG/ML
INJECTION, SOLUTION EPIDURAL; INFILTRATION; INTRACAUDAL; PERINEURAL
Status: DISPENSED
Start: 2023-07-10

## (undated) RX ORDER — HEPARIN SODIUM 1000 [USP'U]/ML
INJECTION, SOLUTION INTRAVENOUS; SUBCUTANEOUS
Status: DISPENSED
Start: 2021-05-07

## (undated) RX ORDER — FENTANYL CITRATE 50 UG/ML
INJECTION, SOLUTION INTRAMUSCULAR; INTRAVENOUS
Status: DISPENSED
Start: 2020-04-14

## (undated) RX ORDER — HEPARIN SODIUM (PORCINE) LOCK FLUSH IV SOLN 100 UNIT/ML 100 UNIT/ML
SOLUTION INTRAVENOUS
Status: DISPENSED
Start: 2021-03-19

## (undated) RX ORDER — HEPARIN SODIUM (PORCINE) LOCK FLUSH IV SOLN 100 UNIT/ML 100 UNIT/ML
SOLUTION INTRAVENOUS
Status: DISPENSED
Start: 2020-09-04

## (undated) RX ORDER — CEFAZOLIN SODIUM 2 G/100ML
INJECTION, SOLUTION INTRAVENOUS
Status: DISPENSED
Start: 2024-09-11

## (undated) RX ORDER — CITRIC ACID/SODIUM CITRATE 334-500MG
SOLUTION, ORAL ORAL
Status: DISPENSED
Start: 2018-08-02

## (undated) RX ORDER — LIDOCAINE HYDROCHLORIDE 10 MG/ML
INJECTION, SOLUTION EPIDURAL; INFILTRATION; INTRACAUDAL; PERINEURAL
Status: DISPENSED
Start: 2021-03-19

## (undated) RX ORDER — LIDOCAINE HYDROCHLORIDE 10 MG/ML
INJECTION, SOLUTION EPIDURAL; INFILTRATION; INTRACAUDAL; PERINEURAL
Status: DISPENSED
Start: 2021-02-03

## (undated) RX ORDER — LIDOCAINE HYDROCHLORIDE AND EPINEPHRINE 10; 10 MG/ML; UG/ML
INJECTION, SOLUTION INFILTRATION; PERINEURAL
Status: DISPENSED
Start: 2017-02-15

## (undated) RX ORDER — PROPOFOL 10 MG/ML
INJECTION, EMULSION INTRAVENOUS
Status: DISPENSED
Start: 2017-12-19

## (undated) RX ORDER — LIDOCAINE HYDROCHLORIDE 10 MG/ML
INJECTION, SOLUTION EPIDURAL; INFILTRATION; INTRACAUDAL; PERINEURAL
Status: DISPENSED
Start: 2021-02-05

## (undated) RX ORDER — FENTANYL CITRATE 50 UG/ML
INJECTION, SOLUTION INTRAMUSCULAR; INTRAVENOUS
Status: DISPENSED
Start: 2018-09-18

## (undated) RX ORDER — FENTANYL CITRATE 50 UG/ML
INJECTION, SOLUTION INTRAMUSCULAR; INTRAVENOUS
Status: DISPENSED
Start: 2022-05-12

## (undated) RX ORDER — GABAPENTIN 300 MG/1
CAPSULE ORAL
Status: DISPENSED
Start: 2017-12-19

## (undated) RX ORDER — HEPARIN SODIUM,PORCINE 10 UNIT/ML
VIAL (ML) INTRAVENOUS
Status: DISPENSED
Start: 2018-08-02

## (undated) RX ORDER — FENTANYL CITRATE 50 UG/ML
INJECTION, SOLUTION INTRAMUSCULAR; INTRAVENOUS
Status: DISPENSED
Start: 2023-07-06

## (undated) RX ORDER — SODIUM CHLORIDE 9 MG/ML
INJECTION, SOLUTION INTRAVENOUS
Status: DISPENSED
Start: 2023-06-07

## (undated) RX ORDER — HEPARIN SODIUM,PORCINE 10 UNIT/ML
VIAL (ML) INTRAVENOUS
Status: DISPENSED
Start: 2019-08-07

## (undated) RX ORDER — DEXTROSE MONOHYDRATE 25 G/50ML
INJECTION, SOLUTION INTRAVENOUS
Status: DISPENSED
Start: 2021-02-24

## (undated) RX ORDER — HEPARIN SODIUM,PORCINE 10 UNIT/ML
VIAL (ML) INTRAVENOUS
Status: DISPENSED
Start: 2023-07-10

## (undated) RX ORDER — LIDOCAINE HYDROCHLORIDE 10 MG/ML
INJECTION, SOLUTION EPIDURAL; INFILTRATION; INTRACAUDAL; PERINEURAL
Status: DISPENSED
Start: 2023-09-19

## (undated) RX ORDER — CLINDAMYCIN PHOSPHATE 900 MG/50ML
INJECTION, SOLUTION INTRAVENOUS
Status: DISPENSED
Start: 2017-12-19

## (undated) RX ORDER — HEPARIN SODIUM 1000 [USP'U]/ML
INJECTION, SOLUTION INTRAVENOUS; SUBCUTANEOUS
Status: DISPENSED
Start: 2024-09-11

## (undated) RX ORDER — HEPARIN SODIUM,PORCINE 10 UNIT/ML
VIAL (ML) INTRAVENOUS
Status: DISPENSED
Start: 2017-12-19

## (undated) RX ORDER — FENTANYL CITRATE 50 UG/ML
INJECTION, SOLUTION INTRAMUSCULAR; INTRAVENOUS
Status: DISPENSED
Start: 2024-01-25

## (undated) RX ORDER — LIDOCAINE HYDROCHLORIDE 10 MG/ML
INJECTION, SOLUTION EPIDURAL; INFILTRATION; INTRACAUDAL; PERINEURAL
Status: DISPENSED
Start: 2019-10-02

## (undated) RX ORDER — LIDOCAINE HYDROCHLORIDE 10 MG/ML
INJECTION, SOLUTION EPIDURAL; INFILTRATION; INTRACAUDAL; PERINEURAL
Status: DISPENSED
Start: 2024-08-12

## (undated) RX ORDER — LIDOCAINE HYDROCHLORIDE 10 MG/ML
INJECTION, SOLUTION EPIDURAL; INFILTRATION; INTRACAUDAL; PERINEURAL
Status: DISPENSED
Start: 2019-03-07

## (undated) RX ORDER — LIDOCAINE HYDROCHLORIDE 10 MG/ML
INJECTION, SOLUTION EPIDURAL; INFILTRATION; INTRACAUDAL; PERINEURAL
Status: DISPENSED
Start: 2023-11-17

## (undated) RX ORDER — CEFAZOLIN SODIUM 1 G/3ML
INJECTION, POWDER, FOR SOLUTION INTRAMUSCULAR; INTRAVENOUS
Status: DISPENSED
Start: 2017-02-15

## (undated) RX ORDER — GABAPENTIN 300 MG/1
CAPSULE ORAL
Status: DISPENSED
Start: 2020-02-21

## (undated) RX ORDER — FENTANYL CITRATE 50 UG/ML
INJECTION, SOLUTION INTRAMUSCULAR; INTRAVENOUS
Status: DISPENSED
Start: 2020-08-03

## (undated) RX ORDER — HEPARIN SODIUM,PORCINE 10 UNIT/ML
VIAL (ML) INTRAVENOUS
Status: DISPENSED
Start: 2022-05-12

## (undated) RX ORDER — HEPARIN SODIUM,PORCINE 10 UNIT/ML
VIAL (ML) INTRAVENOUS
Status: DISPENSED
Start: 2022-01-13

## (undated) RX ORDER — KETAMINE HYDROCHLORIDE 100 MG/ML
INJECTION, SOLUTION INTRAMUSCULAR; INTRAVENOUS
Status: DISPENSED
Start: 2017-02-15

## (undated) RX ORDER — FENTANYL CITRATE 50 UG/ML
INJECTION, SOLUTION INTRAMUSCULAR; INTRAVENOUS
Status: DISPENSED
Start: 2021-03-23

## (undated) RX ORDER — OXYCODONE HYDROCHLORIDE 5 MG/1
TABLET ORAL
Status: DISPENSED
Start: 2023-09-19

## (undated) RX ORDER — HEPARIN SODIUM,PORCINE 10 UNIT/ML
VIAL (ML) INTRAVENOUS
Status: DISPENSED
Start: 2020-02-21

## (undated) RX ORDER — LIDOCAINE HYDROCHLORIDE 10 MG/ML
INJECTION, SOLUTION EPIDURAL; INFILTRATION; INTRACAUDAL; PERINEURAL
Status: DISPENSED
Start: 2018-08-02

## (undated) RX ORDER — CLINDAMYCIN PHOSPHATE 900 MG/50ML
INJECTION, SOLUTION INTRAVENOUS
Status: DISPENSED
Start: 2021-03-19

## (undated) RX ORDER — HEPARIN SODIUM,PORCINE 10 UNIT/ML
VIAL (ML) INTRAVENOUS
Status: DISPENSED
Start: 2021-02-24

## (undated) RX ORDER — LIDOCAINE HYDROCHLORIDE 10 MG/ML
INJECTION, SOLUTION EPIDURAL; INFILTRATION; INTRACAUDAL; PERINEURAL
Status: DISPENSED
Start: 2024-09-11

## (undated) RX ORDER — FENTANYL CITRATE 50 UG/ML
INJECTION, SOLUTION INTRAMUSCULAR; INTRAVENOUS
Status: DISPENSED
Start: 2021-03-19

## (undated) RX ORDER — HEPARIN SODIUM,PORCINE 10 UNIT/ML
VIAL (ML) INTRAVENOUS
Status: DISPENSED
Start: 2021-03-19

## (undated) RX ORDER — LIDOCAINE HYDROCHLORIDE 10 MG/ML
INJECTION, SOLUTION EPIDURAL; INFILTRATION; INTRACAUDAL; PERINEURAL
Status: DISPENSED
Start: 2020-08-02

## (undated) RX ORDER — HEPARIN SODIUM (PORCINE) LOCK FLUSH IV SOLN 100 UNIT/ML 100 UNIT/ML
SOLUTION INTRAVENOUS
Status: DISPENSED
Start: 2024-09-11

## (undated) RX ORDER — ONDANSETRON 2 MG/ML
INJECTION INTRAMUSCULAR; INTRAVENOUS
Status: DISPENSED
Start: 2017-12-19

## (undated) RX ORDER — SODIUM CHLORIDE 9 MG/ML
INJECTION, SOLUTION INTRAVENOUS
Status: DISPENSED
Start: 2024-08-12

## (undated) RX ORDER — FENTANYL CITRATE 50 UG/ML
INJECTION, SOLUTION INTRAMUSCULAR; INTRAVENOUS
Status: DISPENSED
Start: 2019-10-03

## (undated) RX ORDER — SODIUM CHLORIDE 9 MG/ML
INJECTION, SOLUTION INTRAVENOUS
Status: DISPENSED
Start: 2021-05-07

## (undated) RX ORDER — CEFAZOLIN SODIUM 2 G/100ML
INJECTION, SOLUTION INTRAVENOUS
Status: DISPENSED
Start: 2024-08-12

## (undated) RX ORDER — LIDOCAINE HYDROCHLORIDE 10 MG/ML
INJECTION, SOLUTION EPIDURAL; INFILTRATION; INTRACAUDAL; PERINEURAL
Status: DISPENSED
Start: 2023-06-08

## (undated) RX ORDER — ONDANSETRON 2 MG/ML
INJECTION INTRAMUSCULAR; INTRAVENOUS
Status: DISPENSED
Start: 2023-09-30

## (undated) RX ORDER — HEPARIN SODIUM,PORCINE 10 UNIT/ML
VIAL (ML) INTRAVENOUS
Status: DISPENSED
Start: 2020-08-03

## (undated) RX ORDER — KETAMINE HCL IN 0.9 % NACL 50 MG/5 ML
SYRINGE (ML) INTRAVENOUS
Status: DISPENSED
Start: 2019-01-08

## (undated) RX ORDER — LIDOCAINE HYDROCHLORIDE 10 MG/ML
INJECTION, SOLUTION EPIDURAL; INFILTRATION; INTRACAUDAL; PERINEURAL
Status: DISPENSED
Start: 2022-07-10

## (undated) RX ORDER — LIDOCAINE HYDROCHLORIDE 10 MG/ML
INJECTION, SOLUTION EPIDURAL; INFILTRATION; INTRACAUDAL; PERINEURAL
Status: DISPENSED
Start: 2018-06-04

## (undated) RX ORDER — PROPOFOL 10 MG/ML
INJECTION, EMULSION INTRAVENOUS
Status: DISPENSED
Start: 2023-11-17

## (undated) RX ORDER — LIDOCAINE HYDROCHLORIDE 10 MG/ML
INJECTION, SOLUTION EPIDURAL; INFILTRATION; INTRACAUDAL; PERINEURAL
Status: DISPENSED
Start: 2020-08-03

## (undated) RX ORDER — CLINDAMYCIN PHOSPHATE 900 MG/50ML
INJECTION, SOLUTION INTRAVENOUS
Status: DISPENSED
Start: 2021-02-24

## (undated) RX ORDER — CLINDAMYCIN PHOSPHATE 900 MG/50ML
INJECTION, SOLUTION INTRAVENOUS
Status: DISPENSED
Start: 2020-04-14

## (undated) RX ORDER — HEPARIN SODIUM,PORCINE 10 UNIT/ML
VIAL (ML) INTRAVENOUS
Status: DISPENSED
Start: 2020-04-14

## (undated) RX ORDER — FENTANYL CITRATE 50 UG/ML
INJECTION, SOLUTION INTRAMUSCULAR; INTRAVENOUS
Status: DISPENSED
Start: 2018-01-17

## (undated) RX ORDER — DIPHENHYDRAMINE HYDROCHLORIDE 50 MG/ML
INJECTION INTRAMUSCULAR; INTRAVENOUS
Status: DISPENSED
Start: 2018-01-15

## (undated) RX ORDER — LIDOCAINE HYDROCHLORIDE 10 MG/ML
INJECTION, SOLUTION EPIDURAL; INFILTRATION; INTRACAUDAL; PERINEURAL
Status: DISPENSED
Start: 2020-09-02

## (undated) RX ORDER — PROPOFOL 10 MG/ML
INJECTION, EMULSION INTRAVENOUS
Status: DISPENSED
Start: 2019-01-08

## (undated) RX ORDER — LIDOCAINE HYDROCHLORIDE 10 MG/ML
INJECTION, SOLUTION EPIDURAL; INFILTRATION; INTRACAUDAL; PERINEURAL
Status: DISPENSED
Start: 2020-08-01

## (undated) RX ORDER — LIDOCAINE HYDROCHLORIDE 10 MG/ML
INJECTION, SOLUTION EPIDURAL; INFILTRATION; INTRACAUDAL; PERINEURAL
Status: DISPENSED
Start: 2021-03-23

## (undated) RX ORDER — HEPARIN SODIUM,PORCINE 10 UNIT/ML
VIAL (ML) INTRAVENOUS
Status: DISPENSED
Start: 2023-09-19

## (undated) RX ORDER — LIDOCAINE HYDROCHLORIDE 10 MG/ML
INJECTION, SOLUTION EPIDURAL; INFILTRATION; INTRACAUDAL; PERINEURAL
Status: DISPENSED
Start: 2020-09-04

## (undated) RX ORDER — SCOLOPAMINE TRANSDERMAL SYSTEM 1 MG/1
PATCH, EXTENDED RELEASE TRANSDERMAL
Status: DISPENSED
Start: 2017-02-15

## (undated) RX ORDER — LEVOFLOXACIN 5 MG/ML
INJECTION, SOLUTION INTRAVENOUS
Status: DISPENSED
Start: 2021-02-24

## (undated) RX ORDER — GLYCOPYRROLATE 0.2 MG/ML
INJECTION, SOLUTION INTRAMUSCULAR; INTRAVENOUS
Status: DISPENSED
Start: 2021-02-24

## (undated) RX ORDER — LIDOCAINE HYDROCHLORIDE 10 MG/ML
INJECTION, SOLUTION EPIDURAL; INFILTRATION; INTRACAUDAL; PERINEURAL
Status: DISPENSED
Start: 2022-05-12

## (undated) RX ORDER — HEPARIN SODIUM,PORCINE 10 UNIT/ML
VIAL (ML) INTRAVENOUS
Status: DISPENSED
Start: 2021-02-05

## (undated) RX ORDER — LIDOCAINE HYDROCHLORIDE 10 MG/ML
INJECTION, SOLUTION EPIDURAL; INFILTRATION; INTRACAUDAL; PERINEURAL
Status: DISPENSED
Start: 2024-01-25

## (undated) RX ORDER — HEPARIN SODIUM,PORCINE 10 UNIT/ML
VIAL (ML) INTRAVENOUS
Status: DISPENSED
Start: 2018-01-17

## (undated) RX ORDER — LIDOCAINE HYDROCHLORIDE 10 MG/ML
INJECTION, SOLUTION EPIDURAL; INFILTRATION; INTRACAUDAL; PERINEURAL
Status: DISPENSED
Start: 2022-02-26